# Patient Record
Sex: FEMALE | Race: WHITE | NOT HISPANIC OR LATINO | Employment: OTHER | URBAN - METROPOLITAN AREA
[De-identification: names, ages, dates, MRNs, and addresses within clinical notes are randomized per-mention and may not be internally consistent; named-entity substitution may affect disease eponyms.]

---

## 2017-06-21 ENCOUNTER — OFFICE VISIT (OUTPATIENT)
Dept: INTERNAL MEDICINE | Facility: CLINIC | Age: 57
End: 2017-06-21
Attending: INTERNAL MEDICINE
Payer: COMMERCIAL

## 2017-06-21 VITALS
HEART RATE: 101 BPM | BODY MASS INDEX: 20.58 KG/M2 | WEIGHT: 128.06 LBS | SYSTOLIC BLOOD PRESSURE: 145 MMHG | OXYGEN SATURATION: 96 % | DIASTOLIC BLOOD PRESSURE: 83 MMHG | HEIGHT: 66 IN

## 2017-06-21 DIAGNOSIS — M79.7 FIBROMYALGIA: ICD-10-CM

## 2017-06-21 DIAGNOSIS — D86.0 SARCOIDOSIS OF LUNG: ICD-10-CM

## 2017-06-21 DIAGNOSIS — K70.0 ALCOHOLIC FATTY LIVER: ICD-10-CM

## 2017-06-21 DIAGNOSIS — Z72.0 TOBACCO ABUSE: ICD-10-CM

## 2017-06-21 DIAGNOSIS — E78.5 HYPERLIPIDEMIA, UNSPECIFIED HYPERLIPIDEMIA TYPE: ICD-10-CM

## 2017-06-21 DIAGNOSIS — R11.0 NAUSEA: ICD-10-CM

## 2017-06-21 DIAGNOSIS — F10.10 ETOH ABUSE: Primary | ICD-10-CM

## 2017-06-21 DIAGNOSIS — T14.8XXA BRUISING: ICD-10-CM

## 2017-06-21 DIAGNOSIS — Z12.39 SCREENING FOR BREAST CANCER: ICD-10-CM

## 2017-06-21 DIAGNOSIS — F33.1 MODERATE EPISODE OF RECURRENT MAJOR DEPRESSIVE DISORDER: ICD-10-CM

## 2017-06-21 DIAGNOSIS — I10 ESSENTIAL HYPERTENSION: ICD-10-CM

## 2017-06-21 PROCEDURE — 99999 PR PBB SHADOW E&M-EST. PATIENT-LVL IV: CPT | Mod: PBBFAC,,, | Performed by: INTERNAL MEDICINE

## 2017-06-21 PROCEDURE — 99214 OFFICE O/P EST MOD 30 MIN: CPT | Mod: S$GLB,,, | Performed by: INTERNAL MEDICINE

## 2017-06-21 RX ORDER — ONDANSETRON 4 MG/1
4 TABLET, ORALLY DISINTEGRATING ORAL EVERY 8 HOURS PRN
Qty: 12 TABLET | Refills: 0 | Status: SHIPPED | OUTPATIENT
Start: 2017-06-21 | End: 2017-10-03 | Stop reason: SDUPTHER

## 2017-06-21 NOTE — PROGRESS NOTES
"Subjective:       Patient ID: Earl Abdul is a 57 y.o. female.    Chief Complaint: Fatigue; Dizziness; and Nausea    HPI   Here to est care.     Hx of depression on multiple meds pristiq, buspirone, trazodone - does not have psychiatrist. Reports has been on these meds for many years. Reports depression poorly controlled. No si/hi/rawls.   Stopped seeing counselor 1 month ago. Did not feel like she was getting much feedback.   Has long standing hx of etoh and tobacco abuse. Started drinking again 6 weeks ago as son going through trial and was recently incarcerated x 5 years for DWI  She reports drinking 4-5 drinks per day - mostly gin and wine. Awakes in am jittery. Drinks throughout the day.   Has hx of ETOH fatty liver F/u with Dr. Eldon Siddiqui - had liver bx in past - Has appt 7/11/17  Hx of cdiff as well that was treated in 2016 per her.     F/u with pain mgmt [facility] for morphine 10mg for djd of back. She takes baclofen prn for mm spasms - this was ordered by pcp     She reports would like to consider inpatient detox to "get off alcohol and morphine".     Reports due to son's recent trial and sentencing and worsening of depression she has not been eating and drinking other fluids outside of ETOH over past few weeks. Over past few days has noticed drinking less fluids other than etoh and will get lightheaded if stands to quickly. This quickly subsides after a few seconds. Does not occur at other times. No palpitations. No numbness or vision changes.   No si/hi/rawls at this time.   She is taking Bentyl prn and also takes pantoprazole daily. Has long standing hx of nausea - most prominent in mornings after waking over past couple of years.    Taking vit d and MVI  Drinking ensure which she has noticed causes loose stools at times. Reports has hx of constipation and episodic loose stools in bt. No abd pain. No fevers or chills. No AMS. No jaundice. No inc in abd girth.   Reports has noticed bruising on arms. " Not legs - may be due to running into objects. No abuse at home. No gum bleeding, no melena or brbpr. No vag bleeding.    No urinary frequency, urgency, burning with urination, hematuria, foul smelling urine, cloudy urine, suprapubic pain or pressure.   No cp or sob.       Has hx of htn but med was stopped after pt was hypotensive at last admission in 2016. She reports bp is usually less than 140/90.   Review of Systems    Objective:      Physical Exam   Constitutional: She is oriented to person, place, and time. She appears well-developed and well-nourished.   HENT:   Head: Normocephalic and atraumatic.   Right Ear: External ear normal.   Left Ear: External ear normal.   Nose: Nose normal.   Mouth/Throat: Oropharynx is clear and moist. No oropharyngeal exudate.   No carotid bruits   Eyes: Conjunctivae and EOM are normal.   Neck: Neck supple. No thyromegaly present.   Cardiovascular: Normal rate, regular rhythm, normal heart sounds and intact distal pulses.    Pulmonary/Chest: Effort normal and breath sounds normal.   Abdominal: Soft. Bowel sounds are normal. She exhibits no distension and no mass. There is no tenderness. There is no rebound and no guarding. No hernia.   No cva or flank ttp   Musculoskeletal: She exhibits no edema or tenderness.   Lymphadenopathy:     She has no cervical adenopathy.   Neurological: She is alert and oriented to person, place, and time. Coordination normal.   Skin: Skin is warm and dry. Capillary refill takes less than 2 seconds.   Ecchymosis to left deltoid 1 cm round   Psychiatric: She has a normal mood and affect. Her behavior is normal. Judgment and thought content normal.       Assessment:       Earl was seen today for fatigue, dizziness and nausea.    Diagnoses and all orders for this visit:    ETOH abuse: refer to psych, CM for inpt detox list.   -     Ambulatory Referral to Addiction Specialist  -     Ambulatory referral to Outpatient Case Management  -     Ammonia;  Future    Nausea: chronic - suspect due to etoh and possibly gastritis diff dx long - check labs - inpt detox.  -     TSH; Future  -     CBC auto differential; Future  -     Comprehensive metabolic panel; Future  -     Hemoglobin A1c; Future    Essential hypertension: no longer on meds for this- hold off on resuming med until labs return. She has intermittent orthostatic hypotension from poor po intake and etoh - rec she monitor this at home and on rtc. If persistently > 140/90 then will resume med    Fibromyalgia:     Sarcoidosis of lung: per pt stable - does not f/u with pulm - no sob, wheezing    Tobacco abuse: counseled to quit    Moderate episode of recurrent major depressive disorder: needs to est care with psych for med mgmt - not well controlled. No si/hi/rawls  -     Ambulatory Referral to Psychology    Hyperlipidemia, unspecified hyperlipidemia type  -     Lipid panel; Future    Screening for breast cancer  -     Mammo Digital Screening Bilat with CAD; Future    Alcoholic fatty liver:keep appt with GI, agree with inpt detox program to help wean off of etoh - check labs    Bruising:   -     Protime-INR; Future  -     APTT; Future    Other orders  -     ondansetron (ZOFRAN ODT) 4 MG TbDL; Take 1 tablet (4 mg total) by mouth every 8 (eight) hours as needed (nausea).    orthostatic hypotension: rec cont to inc po intake -     ER and RTC prompts given

## 2017-06-22 ENCOUNTER — LAB VISIT (OUTPATIENT)
Dept: LAB | Facility: OTHER | Age: 57
End: 2017-06-22
Attending: INTERNAL MEDICINE
Payer: COMMERCIAL

## 2017-06-22 DIAGNOSIS — T14.8XXA BRUISING: ICD-10-CM

## 2017-06-22 DIAGNOSIS — R11.0 NAUSEA: ICD-10-CM

## 2017-06-22 DIAGNOSIS — F10.10 ETOH ABUSE: ICD-10-CM

## 2017-06-22 LAB
ALBUMIN SERPL BCP-MCNC: 4.2 G/DL
ALP SERPL-CCNC: 147 U/L
ALT SERPL W/O P-5'-P-CCNC: 52 U/L
AMMONIA PLAS-SCNC: 59 UMOL/L
ANION GAP SERPL CALC-SCNC: 14 MMOL/L
ANISOCYTOSIS BLD QL SMEAR: SLIGHT
APTT BLDCRRT: 28.2 SEC
AST SERPL-CCNC: 199 U/L
BASOPHILS # BLD AUTO: 0.01 K/UL
BASOPHILS NFR BLD: 0.2 %
BILIRUB SERPL-MCNC: 1.3 MG/DL
BUN SERPL-MCNC: 6 MG/DL
CALCIUM SERPL-MCNC: 9.3 MG/DL
CHLORIDE SERPL-SCNC: 100 MMOL/L
CO2 SERPL-SCNC: 31 MMOL/L
CREAT SERPL-MCNC: 0.8 MG/DL
DIFFERENTIAL METHOD: ABNORMAL
EOSINOPHIL # BLD AUTO: 0.1 K/UL
EOSINOPHIL NFR BLD: 1.7 %
ERYTHROCYTE [DISTWIDTH] IN BLOOD BY AUTOMATED COUNT: 14.3 %
EST. GFR  (AFRICAN AMERICAN): >60 ML/MIN/1.73 M^2
EST. GFR  (NON AFRICAN AMERICAN): >60 ML/MIN/1.73 M^2
GLUCOSE SERPL-MCNC: 87 MG/DL
HCT VFR BLD AUTO: 42.8 %
HGB BLD-MCNC: 14.2 G/DL
INR PPP: 1
LYMPHOCYTES # BLD AUTO: 2 K/UL
LYMPHOCYTES NFR BLD: 46.9 %
MCH RBC QN AUTO: 33.3 PG
MCHC RBC AUTO-ENTMCNC: 33.2 %
MCV RBC AUTO: 100 FL
MONOCYTES # BLD AUTO: 0.4 K/UL
MONOCYTES NFR BLD: 10.5 %
NEUTROPHILS # BLD AUTO: 1.7 K/UL
NEUTROPHILS NFR BLD: 40.5 %
PLATELET # BLD AUTO: 57 K/UL
PLATELET BLD QL SMEAR: ABNORMAL
PMV BLD AUTO: 10.3 FL
POTASSIUM SERPL-SCNC: 4.4 MMOL/L
PROT SERPL-MCNC: 7.8 G/DL
PROTHROMBIN TIME: 11.3 SEC
RBC # BLD AUTO: 4.27 M/UL
SODIUM SERPL-SCNC: 145 MMOL/L
TSH SERPL DL<=0.005 MIU/L-ACNC: 2.35 UIU/ML
WBC # BLD AUTO: 4.2 K/UL

## 2017-06-22 PROCEDURE — 85730 THROMBOPLASTIN TIME PARTIAL: CPT

## 2017-06-22 PROCEDURE — 36415 COLL VENOUS BLD VENIPUNCTURE: CPT

## 2017-06-22 PROCEDURE — 80053 COMPREHEN METABOLIC PANEL: CPT

## 2017-06-22 PROCEDURE — 85025 COMPLETE CBC W/AUTO DIFF WBC: CPT

## 2017-06-22 PROCEDURE — 83036 HEMOGLOBIN GLYCOSYLATED A1C: CPT

## 2017-06-22 PROCEDURE — 85610 PROTHROMBIN TIME: CPT

## 2017-06-22 PROCEDURE — 82140 ASSAY OF AMMONIA: CPT

## 2017-06-22 PROCEDURE — 84443 ASSAY THYROID STIM HORMONE: CPT

## 2017-06-23 ENCOUNTER — TELEPHONE (OUTPATIENT)
Dept: INTERNAL MEDICINE | Facility: CLINIC | Age: 57
End: 2017-06-23

## 2017-06-23 ENCOUNTER — OUTPATIENT CASE MANAGEMENT (OUTPATIENT)
Dept: ADMINISTRATIVE | Facility: OTHER | Age: 57
End: 2017-06-23

## 2017-06-23 DIAGNOSIS — F10.29 ALCOHOL DEPENDENCE WITH UNSPECIFIED ALCOHOL-INDUCED DISORDER: ICD-10-CM

## 2017-06-23 DIAGNOSIS — D75.89 MACROCYTOSIS: ICD-10-CM

## 2017-06-23 DIAGNOSIS — D69.1 THROMBOCYTOPATHIA: Primary | ICD-10-CM

## 2017-06-23 DIAGNOSIS — R79.89 ELEVATED LFTS: ICD-10-CM

## 2017-06-23 DIAGNOSIS — D69.6 THROMBOCYTOPENIA: ICD-10-CM

## 2017-06-23 LAB
ESTIMATED AVG GLUCOSE: 80 MG/DL
HBA1C MFR BLD HPLC: 4.4 %

## 2017-06-23 NOTE — TELEPHONE ENCOUNTER
Inform pt that I am covering for Dr. Galvin and that labs show platelets to be low which is why she may be bruising. I'd like her to repeat this test today if she can or soon as she can--please schedule lab. Also, her liver tests were elevated which may be due to alcohol. I'd like her to get an ultrasound--please arrange. It is important she reduce and eliminate alcohol intake. I will forward results to Dr. Galvin to review next week. If she has any uncontrollable bleeding or new concerning symptoms that arise, she needs to go to emergency room.

## 2017-06-23 NOTE — PROGRESS NOTES
Thank you for the referral.  Patient has been assigned to RAMY Cristina for low risk screening for Outpatient Case Management.     Reason for referral:   ETOH abuse    Thank you,  Angelina Mccarthy

## 2017-06-26 ENCOUNTER — TELEPHONE (OUTPATIENT)
Dept: INTERNAL MEDICINE | Facility: CLINIC | Age: 57
End: 2017-06-26

## 2017-06-26 NOTE — TELEPHONE ENCOUNTER
----- Message from Jose Lucas sent at 6/26/2017  9:40 AM CDT -----  Contact: pt  x1st Request   _ 2nd Request   _ 3rd Request     Who: MARTY SALDAÑA [[id number]]    Why: Pt missed your call is requesting a call back.  Pt also wants to reschedule her U/S.    What Number to Call Back: [phone]    When to Expect a call back: (Before the end of the day)   -- if call after 3:00 call back will be tomorrow.

## 2017-06-26 NOTE — TELEPHONE ENCOUNTER
----- Message from Oralia Blackman sent at 6/23/2017  3:54 PM CDT -----  Contact: pt  _  1st Request  _  2nd Request  _  3rd Request        Who: pt    Why: pt returned the nurse's phone call. Please return call before the day is over with so she won't worry regarding her labs. Please call the pt     What Number to Call Back:[phone]    When to Expect a call back: (Before the end of the day)   -- if the call is after 12:00, the call back will be tomorrow.

## 2017-06-26 NOTE — TELEPHONE ENCOUNTER
Pt states that she will be able to make it to her upcoming US appointment. Pt states that she was unaware that she had an appointment scheduled for Today. Pt has been informed that the office attempted to contact her several times on 6/23/2017 upon review of her chart. Pt has no further questions at this time.

## 2017-06-28 ENCOUNTER — OUTPATIENT CASE MANAGEMENT (OUTPATIENT)
Dept: ADMINISTRATIVE | Facility: OTHER | Age: 57
End: 2017-06-28

## 2017-06-28 ENCOUNTER — TELEPHONE (OUTPATIENT)
Dept: INTERNAL MEDICINE | Facility: CLINIC | Age: 57
End: 2017-06-28

## 2017-06-28 NOTE — TELEPHONE ENCOUNTER
Pt states that she has upcoming labs scheduled and will be in a detox facility when her lab results come in. Pt is requesting that pcp contact her  if her lab results return and are abnormal or emergent.Pt has been informed that the pcp is currently out of the office and will not return until July 5; this message will be routed to her box.

## 2017-06-28 NOTE — PROGRESS NOTES
Attempt #:  1  This CSW attempted to reach patient/caregiver to provide resource and left a message requesting a return call.      CSW returned patient call from recorder. No answer CSW left a message for a return call.

## 2017-06-28 NOTE — TELEPHONE ENCOUNTER
----- Message from Eun Do sent at 6/28/2017  9:45 AM CDT -----  Contact: MARTY SALDAÑA [[id number]]  _x  1st Request  _  2nd Request  _  3rd Request        Who: MARTY SALDAÑA [[id number]]    Why: pt would like a call back states its very important she talk to the doctor . Please call, Thanks!    What Number to Call Back: [phone]    When to Expect a call back: (Before the end of the day)   -- if the call is after 12:00, the call back will be tomorrow.

## 2017-06-29 ENCOUNTER — OUTPATIENT CASE MANAGEMENT (OUTPATIENT)
Dept: ADMINISTRATIVE | Facility: OTHER | Age: 57
End: 2017-06-29

## 2017-06-29 ENCOUNTER — TELEPHONE (OUTPATIENT)
Dept: INTERNAL MEDICINE | Facility: CLINIC | Age: 57
End: 2017-06-29

## 2017-06-29 NOTE — TELEPHONE ENCOUNTER
----- Message from RAMY Childress sent at 6/29/2017  2:31 PM CDT -----  This CSW contacted patient regarding referral. Patient advised CSW she will start a detox program on Sunday. Patient also advised CSW she has a Counselor/Therapist.    Patient stated there were no needs at this time.      Thank you for the referral,    RAMY Cristina

## 2017-06-29 NOTE — PROGRESS NOTES
This CSW contacted patient regarding referral.  Patient/Caregiver stated there were no needs at this time.Patient advised CSW she will start a detox program on Sunday. Patient also advised CSW she has a Counselor.

## 2017-07-14 ENCOUNTER — HOSPITAL ENCOUNTER (OUTPATIENT)
Dept: RADIOLOGY | Facility: OTHER | Age: 57
Discharge: HOME | End: 2017-07-14
Attending: INTERNAL MEDICINE
Payer: COMMERCIAL

## 2017-07-14 VITALS — HEIGHT: 66 IN | WEIGHT: 128 LBS | BODY MASS INDEX: 20.57 KG/M2

## 2017-07-14 DIAGNOSIS — D69.6 THROMBOCYTOPENIA: ICD-10-CM

## 2017-07-14 DIAGNOSIS — F10.29 ALCOHOL DEPENDENCE WITH UNSPECIFIED ALCOHOL-INDUCED DISORDER: ICD-10-CM

## 2017-07-14 DIAGNOSIS — R79.89 ELEVATED LFTS: ICD-10-CM

## 2017-07-14 DIAGNOSIS — Z12.39 SCREENING FOR BREAST CANCER: ICD-10-CM

## 2017-07-14 PROCEDURE — 77067 SCR MAMMO BI INCL CAD: CPT | Mod: 26,,, | Performed by: RADIOLOGY

## 2017-07-14 PROCEDURE — 77067 SCR MAMMO BI INCL CAD: CPT | Mod: TC

## 2017-07-14 PROCEDURE — 76700 US EXAM ABDOM COMPLETE: CPT | Mod: 26,,, | Performed by: RADIOLOGY

## 2017-07-14 PROCEDURE — 76700 US EXAM ABDOM COMPLETE: CPT | Mod: TC

## 2017-07-15 ENCOUNTER — TELEPHONE (OUTPATIENT)
Dept: INTERNAL MEDICINE | Facility: CLINIC | Age: 57
End: 2017-07-15

## 2017-07-15 NOTE — TELEPHONE ENCOUNTER
Please call pt and schedule labs CBC, folate, B1, B12 ASAP.   Message sent to pt via my chart with lab results and updates to plan.   abd US is stable and there are no findings to explain the low platelet count on prior labs - rec repeat labs asap

## 2017-07-17 NOTE — TELEPHONE ENCOUNTER
Called pt and left a vm stating that  rec pt complete labs asap, also stated that I went ahead and scheduled lab appt for 7/19/2017 at 10:15.  Left office number for pt to call and reschedule if this time does not work for her schedule .

## 2017-07-18 ENCOUNTER — HOSPITAL ENCOUNTER (EMERGENCY)
Facility: HOSPITAL | Age: 57
Discharge: HOME | End: 2017-07-18
Attending: EMERGENCY MEDICINE
Payer: COMMERCIAL

## 2017-07-18 VITALS
BODY MASS INDEX: 19.29 KG/M2 | DIASTOLIC BLOOD PRESSURE: 64 MMHG | WEIGHT: 120 LBS | HEART RATE: 66 BPM | RESPIRATION RATE: 18 BRPM | SYSTOLIC BLOOD PRESSURE: 117 MMHG | TEMPERATURE: 100 F | HEIGHT: 66 IN | OXYGEN SATURATION: 95 %

## 2017-07-18 DIAGNOSIS — E87.6 HYPOKALEMIA: ICD-10-CM

## 2017-07-18 DIAGNOSIS — K52.9 ENTERITIS: Primary | ICD-10-CM

## 2017-07-18 LAB
ALBUMIN SERPL BCP-MCNC: 3.9 G/DL
ALP SERPL-CCNC: 89 U/L
ALT SERPL W/O P-5'-P-CCNC: 69 U/L
ANION GAP SERPL CALC-SCNC: 14 MMOL/L
AST SERPL-CCNC: 55 U/L
BASOPHILS # BLD AUTO: 0.01 K/UL
BASOPHILS NFR BLD: 0.1 %
BILIRUB SERPL-MCNC: 0.9 MG/DL
BILIRUB UR QL STRIP: NEGATIVE
BUN SERPL-MCNC: 3 MG/DL
CALCIUM SERPL-MCNC: 9.2 MG/DL
CHLORIDE SERPL-SCNC: 99 MMOL/L
CLARITY UR: CLEAR
CO2 SERPL-SCNC: 26 MMOL/L
COLOR UR: YELLOW
CREAT SERPL-MCNC: 0.7 MG/DL
DIFFERENTIAL METHOD: ABNORMAL
EOSINOPHIL # BLD AUTO: 0 K/UL
EOSINOPHIL NFR BLD: 0 %
ERYTHROCYTE [DISTWIDTH] IN BLOOD BY AUTOMATED COUNT: 13.9 %
EST. GFR  (AFRICAN AMERICAN): >60 ML/MIN/1.73 M^2
EST. GFR  (NON AFRICAN AMERICAN): >60 ML/MIN/1.73 M^2
GLUCOSE SERPL-MCNC: 85 MG/DL
GLUCOSE UR QL STRIP: NEGATIVE
HCT VFR BLD AUTO: 39.2 %
HGB BLD-MCNC: 13.3 G/DL
HGB UR QL STRIP: NEGATIVE
KETONES UR QL STRIP: NEGATIVE
LEUKOCYTE ESTERASE UR QL STRIP: NEGATIVE
LIPASE SERPL-CCNC: 9 U/L
LYMPHOCYTES # BLD AUTO: 2.3 K/UL
LYMPHOCYTES NFR BLD: 23.5 %
MCH RBC QN AUTO: 32.6 PG
MCHC RBC AUTO-ENTMCNC: 33.9 %
MCV RBC AUTO: 96 FL
MONOCYTES # BLD AUTO: 0.4 K/UL
MONOCYTES NFR BLD: 3.9 %
NEUTROPHILS # BLD AUTO: 7 K/UL
NEUTROPHILS NFR BLD: 72.3 %
NITRITE UR QL STRIP: NEGATIVE
PH UR STRIP: 6 [PH] (ref 5–8)
PLATELET # BLD AUTO: 208 K/UL
PMV BLD AUTO: 10.8 FL
POTASSIUM SERPL-SCNC: 3.1 MMOL/L
PROT SERPL-MCNC: 7.3 G/DL
PROT UR QL STRIP: NEGATIVE
RBC # BLD AUTO: 4.08 M/UL
SODIUM SERPL-SCNC: 139 MMOL/L
SP GR UR STRIP: <=1.005 (ref 1–1.03)
URN SPEC COLLECT METH UR: ABNORMAL
UROBILINOGEN UR STRIP-ACNC: NEGATIVE EU/DL
WBC # BLD AUTO: 9.67 K/UL

## 2017-07-18 PROCEDURE — 63600175 PHARM REV CODE 636 W HCPCS: Performed by: EMERGENCY MEDICINE

## 2017-07-18 PROCEDURE — 99285 EMERGENCY DEPT VISIT HI MDM: CPT | Mod: 25

## 2017-07-18 PROCEDURE — 83690 ASSAY OF LIPASE: CPT

## 2017-07-18 PROCEDURE — 25000003 PHARM REV CODE 250: Performed by: EMERGENCY MEDICINE

## 2017-07-18 PROCEDURE — 96361 HYDRATE IV INFUSION ADD-ON: CPT

## 2017-07-18 PROCEDURE — 93005 ELECTROCARDIOGRAM TRACING: CPT

## 2017-07-18 PROCEDURE — 81003 URINALYSIS AUTO W/O SCOPE: CPT

## 2017-07-18 PROCEDURE — 80053 COMPREHEN METABOLIC PANEL: CPT

## 2017-07-18 PROCEDURE — 96376 TX/PRO/DX INJ SAME DRUG ADON: CPT

## 2017-07-18 PROCEDURE — 96374 THER/PROPH/DIAG INJ IV PUSH: CPT

## 2017-07-18 PROCEDURE — 96375 TX/PRO/DX INJ NEW DRUG ADDON: CPT

## 2017-07-18 PROCEDURE — 85025 COMPLETE CBC W/AUTO DIFF WBC: CPT

## 2017-07-18 PROCEDURE — 25500020 PHARM REV CODE 255: Performed by: EMERGENCY MEDICINE

## 2017-07-18 RX ORDER — CIPROFLOXACIN 500 MG/1
500 TABLET ORAL
Status: COMPLETED | OUTPATIENT
Start: 2017-07-18 | End: 2017-07-18

## 2017-07-18 RX ORDER — DIPHENHYDRAMINE HYDROCHLORIDE 50 MG/ML
50 INJECTION INTRAMUSCULAR; INTRAVENOUS
Status: COMPLETED | OUTPATIENT
Start: 2017-07-18 | End: 2017-07-18

## 2017-07-18 RX ORDER — DICYCLOMINE HYDROCHLORIDE 20 MG/1
20 TABLET ORAL 2 TIMES DAILY
Qty: 20 TABLET | Refills: 0 | Status: SHIPPED | OUTPATIENT
Start: 2017-07-18 | End: 2017-08-17

## 2017-07-18 RX ORDER — METRONIDAZOLE 500 MG/1
500 TABLET ORAL
Status: COMPLETED | OUTPATIENT
Start: 2017-07-18 | End: 2017-07-18

## 2017-07-18 RX ORDER — HYDROMORPHONE HYDROCHLORIDE 1 MG/ML
1 INJECTION, SOLUTION INTRAMUSCULAR; INTRAVENOUS; SUBCUTANEOUS
Status: COMPLETED | OUTPATIENT
Start: 2017-07-18 | End: 2017-07-18

## 2017-07-18 RX ORDER — CIPROFLOXACIN 500 MG/1
500 TABLET ORAL 2 TIMES DAILY
Qty: 14 TABLET | Refills: 0 | Status: ON HOLD | OUTPATIENT
Start: 2017-07-18 | End: 2017-07-23 | Stop reason: HOSPADM

## 2017-07-18 RX ORDER — POTASSIUM CHLORIDE 20 MEQ/1
40 TABLET, EXTENDED RELEASE ORAL
Status: COMPLETED | OUTPATIENT
Start: 2017-07-18 | End: 2017-07-18

## 2017-07-18 RX ORDER — ONDANSETRON 2 MG/ML
4 INJECTION INTRAMUSCULAR; INTRAVENOUS
Status: COMPLETED | OUTPATIENT
Start: 2017-07-18 | End: 2017-07-18

## 2017-07-18 RX ORDER — METRONIDAZOLE 500 MG/1
500 TABLET ORAL 3 TIMES DAILY
Qty: 14 TABLET | Refills: 0 | Status: ON HOLD | OUTPATIENT
Start: 2017-07-18 | End: 2017-07-23 | Stop reason: HOSPADM

## 2017-07-18 RX ADMIN — CIPROFLOXACIN 500 MG: 500 TABLET, FILM COATED ORAL at 10:07

## 2017-07-18 RX ADMIN — DIPHENHYDRAMINE HYDROCHLORIDE 50 MG: 50 INJECTION, SOLUTION INTRAMUSCULAR; INTRAVENOUS at 09:07

## 2017-07-18 RX ADMIN — SODIUM CHLORIDE 1000 ML: 0.9 INJECTION, SOLUTION INTRAVENOUS at 08:07

## 2017-07-18 RX ADMIN — HYDROMORPHONE HYDROCHLORIDE 1 MG: 1 INJECTION, SOLUTION INTRAMUSCULAR; INTRAVENOUS; SUBCUTANEOUS at 08:07

## 2017-07-18 RX ADMIN — POTASSIUM CHLORIDE 40 MEQ: 20 TABLET, EXTENDED RELEASE ORAL at 09:07

## 2017-07-18 RX ADMIN — IOHEXOL 75 ML: 350 INJECTION, SOLUTION INTRAVENOUS at 08:07

## 2017-07-18 RX ADMIN — ONDANSETRON 4 MG: 2 INJECTION INTRAMUSCULAR; INTRAVENOUS at 08:07

## 2017-07-18 RX ADMIN — METRONIDAZOLE 500 MG: 500 TABLET ORAL at 10:07

## 2017-07-19 ENCOUNTER — LAB VISIT (OUTPATIENT)
Dept: LAB | Facility: OTHER | Age: 57
End: 2017-07-19
Attending: INTERNAL MEDICINE
Payer: COMMERCIAL

## 2017-07-19 DIAGNOSIS — F10.29 ALCOHOL DEPENDENCE WITH UNSPECIFIED ALCOHOL-INDUCED DISORDER: ICD-10-CM

## 2017-07-19 DIAGNOSIS — D69.6 THROMBOCYTOPENIA: ICD-10-CM

## 2017-07-19 DIAGNOSIS — D75.89 MACROCYTOSIS: ICD-10-CM

## 2017-07-19 DIAGNOSIS — D69.1 THROMBOCYTOPATHIA: ICD-10-CM

## 2017-07-19 DIAGNOSIS — R10.9 ABDOMINAL PAIN: Primary | ICD-10-CM

## 2017-07-19 DIAGNOSIS — E78.5 HYPERLIPIDEMIA, UNSPECIFIED HYPERLIPIDEMIA TYPE: ICD-10-CM

## 2017-07-19 DIAGNOSIS — R79.89 ELEVATED LFTS: ICD-10-CM

## 2017-07-19 LAB
BASOPHILS # BLD AUTO: 0.02 K/UL
BASOPHILS NFR BLD: 0.3 %
CHOLEST/HDLC SERPL: 6.3 {RATIO}
DIFFERENTIAL METHOD: ABNORMAL
EOSINOPHIL # BLD AUTO: 0.1 K/UL
EOSINOPHIL NFR BLD: 0.8 %
ERYTHROCYTE [DISTWIDTH] IN BLOOD BY AUTOMATED COUNT: 14.3 %
FOLATE SERPL-MCNC: 15.1 NG/ML
HCT VFR BLD AUTO: 38 %
HDL/CHOLESTEROL RATIO: 16 %
HDLC SERPL-MCNC: 194 MG/DL
HDLC SERPL-MCNC: 31 MG/DL
HGB BLD-MCNC: 12.4 G/DL
LDLC SERPL CALC-MCNC: 131 MG/DL
LYMPHOCYTES # BLD AUTO: 2 K/UL
LYMPHOCYTES NFR BLD: 31.5 %
MCH RBC QN AUTO: 32.6 PG
MCHC RBC AUTO-ENTMCNC: 32.6 %
MCV RBC AUTO: 100 FL
MONOCYTES # BLD AUTO: 0.5 K/UL
MONOCYTES NFR BLD: 8.3 %
NEUTROPHILS # BLD AUTO: 3.8 K/UL
NEUTROPHILS NFR BLD: 58.9 %
NONHDLC SERPL-MCNC: 163 MG/DL
PLATELET # BLD AUTO: 191 K/UL
PMV BLD AUTO: 10.8 FL
RBC # BLD AUTO: 3.8 M/UL
TRIGL SERPL-MCNC: 160 MG/DL
VIT B12 SERPL-MCNC: 496 PG/ML
WBC # BLD AUTO: 6.38 K/UL

## 2017-07-19 PROCEDURE — 82746 ASSAY OF FOLIC ACID SERUM: CPT

## 2017-07-19 PROCEDURE — 80061 LIPID PANEL: CPT

## 2017-07-19 PROCEDURE — 85025 COMPLETE CBC W/AUTO DIFF WBC: CPT

## 2017-07-19 PROCEDURE — 82607 VITAMIN B-12: CPT

## 2017-07-19 PROCEDURE — 84425 ASSAY OF VITAMIN B-1: CPT

## 2017-07-19 PROCEDURE — 36415 COLL VENOUS BLD VENIPUNCTURE: CPT

## 2017-07-19 NOTE — DISCHARGE INSTRUCTIONS
1) PLEASE FOLLOW UP WITH YOUR PRIMARY CARE PROVIDER IN 3 DAYS IF YOU ARE NOT SIGNIFICANTLY IMPROVED  2) PLEASE TAKE YOUR MEDICATIONS AS PRESCRIBED- BENTYL AND ZOFRAN TO HELP WITH SYMPTOMS, ANTIBIOTICS TO HELP WITH YOUR COLITIS. PLEASE STAY WELL HYDRATED WITH GATORADE OR ELECTROLYTE RICH FLUIDS, AND EAT A BANANA FOR THE NEXT FEW DAYS  3) RETURN TO THE ED FOR WORSENING SYMPTOMS

## 2017-07-19 NOTE — ED PROVIDER NOTES
Encounter Date: 7/18/2017       History     Chief Complaint   Patient presents with    Abdominal Pain     has been present over the past couple of weeks. worse this morning, reports nause, vomiting, and diarrhea. stats has been unable to hold anything down today     57-year-old female presents emergency Department with complaints of abdominal pain.  Pain started this morning around 1 AM, and is progressively gotten worse.  She has had accompanying diarrhea, vomiting and abdominal cramping. No fevers. No CP/SOB or back pain. No recent travel, antibiotics or sick contacts.       The history is provided by the patient.     Review of patient's allergies indicates:   Allergen Reactions    Fentanyl Other (See Comments)     Other reaction(s): Itching    Lortab  [hydrocodone-acetaminophen] Other (See Comments)    Phenothiazines     Promethazine Other (See Comments)     Other reaction(s): Itching    Promethazine hcl Hives     Past Medical History:   Diagnosis Date    Anemia     Anemia     Depression     Fatty liver     Hyperlipidemia     Hypertension     Polysubstance abuse     Sarcoidosis of lung     Suicide attempt     Suicide ideation      Past Surgical History:   Procedure Laterality Date    APPENDECTOMY      ESOPHAGOGASTRODUODENOSCOPY  10/7/2016, 11/6/2014    2016 - gastritis, duodenitis, 2014 erosive gastritis    FLEXIBLE SIGMOIDOSCOPY  11/06/2014    colitis    HYSTERECTOMY      mediastenoscopy      TONSILLECTOMY N/A 1970    TUBAL LIGATION       Family History   Problem Relation Age of Onset    Breast cancer Maternal Aunt     Colon cancer Maternal Uncle     Breast cancer Daughter     Ovarian cancer Neg Hx      Social History   Substance Use Topics    Smoking status: Current Every Day Smoker     Packs/day: 1.00     Years: 30.00     Types: Cigarettes    Smokeless tobacco: Never Used    Alcohol use No     Review of Systems   Eyes: Negative.    Endocrine: Negative.    Allergic/Immunologic:  Negative.    Neurological: Negative.    All other systems reviewed and are negative.      Physical Exam     Initial Vitals [07/18/17 1923]   BP Pulse Resp Temp SpO2   (!) 189/86 83 15 99.5 °F (37.5 °C) 95 %      MAP       120.33         Physical Exam    Nursing note and vitals reviewed.  Constitutional: She appears well-developed and well-nourished. She appears distressed.   HENT:   Head: Normocephalic and atraumatic.   Eyes: EOM are normal. Pupils are equal, round, and reactive to light.   Neck: Normal range of motion. Neck supple.   Cardiovascular: Normal rate.   Pulmonary/Chest: No respiratory distress.   Abdominal:   She has abdominal tenderness, diffusely, mostly centered around her periumbilical region.   Musculoskeletal: Normal range of motion.   Neurological: She is alert and oriented to person, place, and time. She has normal strength. No cranial nerve deficit.   Skin: Skin is warm and dry.   No leg swelling or edema.   Psychiatric: She has a normal mood and affect. Thought content normal.         ED Course   Procedures  Labs Reviewed   CBC W/ AUTO DIFFERENTIAL - Abnormal; Notable for the following:        Result Value    MCH 32.6 (*)     Mono% 3.9 (*)     All other components within normal limits   COMPREHENSIVE METABOLIC PANEL - Abnormal; Notable for the following:     Potassium 3.1 (*)     BUN, Bld 3 (*)     AST 55 (*)     ALT 69 (*)     All other components within normal limits   URINALYSIS - Abnormal; Notable for the following:     Specific Gravity, UA <=1.005 (*)     All other components within normal limits   LIPASE             Medical Decision Making:   History:   Old Records Summarized: records from previous admission(s).       <> Summary of Records: Just recently had US with normal gallbladder and spleen after elevated bilirubin and thrombocytopenia.   Initial Assessment:   57-year-old female with long-standing history of abdominal cramping and pain, here with abdominal pain,nausea vomiting and  diarrhea.  Differential Diagnosis:   Gastroenteritis, hepatitis, pancreatitis, cholecystitis, GERD, nephrolithiasis, colitis, hypokalemia, renal failure  ED Management:  I reviewed patient's blood work and imaging.  Her potassium was repleted.  She was given several rounds of IV fluids antiemetics, and analgesics.    Her CT scan shows evidence of enteritis.  I placed patient on Cipro and Flagyl, as well as repleted her potassium and recommended that she eat bananas for the next few days.    Patient was counseled on reasons to return to the ED and expressed understanding, patient was discharged in stable condition with appropriate outpatient follow up plan.                      ED Course     Clinical Impression:   The primary encounter diagnosis was Enteritis. A diagnosis of Hypokalemia was also pertinent to this visit.                           Katina Harrison MD  07/25/17 [id number]

## 2017-07-19 NOTE — ED TRIAGE NOTES
"Patient presents to the ED with reports of having worsening abdominal pain with nausea, vomiting, diarrhea, weakness, chills, and dizziness that started last night. States having had abdominal pain that started "a couple weeks ago" but states pain is now worse. Denies any fever, dysuria, or discharge. No relief with Rx zofran at home.     Review of patient's allergies indicates:   Allergen Reactions    Fentanyl Other (See Comments)     Other reaction(s): Itching    Lortab  [hydrocodone-acetaminophen] Other (See Comments)    Phenothiazines     Promethazine Other (See Comments)     Other reaction(s): Itching    Promethazine hcl Hives        Patient has verified the spelling of their name and  on armband.   APPEARANCE: Patient is alert, oriented x 4, and appears distressed.  SKIN: Skin is normal for race, warm, and dry. Normal skin turgor and mucous membranes moist.  CARDIAC: Normal rate and no murmur heard.   RESPIRATORY:Normal rate and effort. Breath sounds clear bilaterally throughout chest. Respirations are equal and unlabored.    GASTRO: Bowel sounds normal, abdomen is soft, and no abdominal distention. Generalized abdominal pain with tenderness to epigastric area. +Vomiting. +Diarrhea.  MUSCLE: Full ROM. No bony tenderness or soft tissue tenderness. No obvious deformity.  PERIPHERAL VASCULAR: peripheral pulses present. Normal cap refill. No edema. Warm to touch.  NEURO: 5/5 strength major flexors/extensors bilaterally. Sensory intact to light touch bilaterally. GCS 15. +Dizziness. +Weakness.   MENTAL STATUS: awake, alert and aware of environment.  "

## 2017-07-20 ENCOUNTER — HOSPITAL ENCOUNTER (OUTPATIENT)
Facility: OTHER | Age: 57
LOS: 1 days | Discharge: HOME | End: 2017-07-23
Attending: EMERGENCY MEDICINE | Admitting: HOSPITALIST
Payer: COMMERCIAL

## 2017-07-20 DIAGNOSIS — R56.9 NEW ONSET SEIZURE: ICD-10-CM

## 2017-07-20 DIAGNOSIS — R11.2 INTRACTABLE NAUSEA AND VOMITING: ICD-10-CM

## 2017-07-20 DIAGNOSIS — R10.9 ABDOMINAL PAIN: ICD-10-CM

## 2017-07-20 DIAGNOSIS — R11.2 INTRACTABLE VOMITING WITH NAUSEA, UNSPECIFIED VOMITING TYPE: ICD-10-CM

## 2017-07-20 DIAGNOSIS — I67.83 PRES (POSTERIOR REVERSIBLE ENCEPHALOPATHY SYNDROME): Primary | ICD-10-CM

## 2017-07-20 DIAGNOSIS — E87.6 HYPOKALEMIA: ICD-10-CM

## 2017-07-20 DIAGNOSIS — R10.9 INTRACTABLE ABDOMINAL PAIN: ICD-10-CM

## 2017-07-20 LAB
ALBUMIN SERPL BCP-MCNC: 4 G/DL
ALP SERPL-CCNC: 90 U/L
ALT SERPL W/O P-5'-P-CCNC: 71 U/L
AMPHET+METHAMPHET UR QL: NEGATIVE
ANION GAP SERPL CALC-SCNC: 14 MMOL/L
ANION GAP SERPL CALC-SCNC: 19 MMOL/L
AST SERPL-CCNC: 59 U/L
BARBITURATES UR QL SCN>200 NG/ML: NEGATIVE
BASOPHILS # BLD AUTO: 0.02 K/UL
BASOPHILS NFR BLD: 0.2 %
BENZODIAZ UR QL SCN>200 NG/ML: NORMAL
BILIRUB SERPL-MCNC: 1.1 MG/DL
BILIRUB UR QL STRIP: NEGATIVE
BUN SERPL-MCNC: 4 MG/DL
BUN SERPL-MCNC: 5 MG/DL
BZE UR QL SCN: NEGATIVE
CALCIUM SERPL-MCNC: 9.1 MG/DL
CALCIUM SERPL-MCNC: 9.8 MG/DL
CANNABINOIDS UR QL SCN: NEGATIVE
CHLORIDE SERPL-SCNC: 104 MMOL/L
CHLORIDE SERPL-SCNC: 104 MMOL/L
CLARITY UR: CLEAR
CO2 SERPL-SCNC: 19 MMOL/L
CO2 SERPL-SCNC: 24 MMOL/L
COLOR UR: YELLOW
CREAT SERPL-MCNC: 0.8 MG/DL
CREAT SERPL-MCNC: 0.8 MG/DL
CREAT UR-MCNC: 58 MG/DL
DIFFERENTIAL METHOD: ABNORMAL
EOSINOPHIL # BLD AUTO: 0.1 K/UL
EOSINOPHIL NFR BLD: 0.7 %
ERYTHROCYTE [DISTWIDTH] IN BLOOD BY AUTOMATED COUNT: 14.1 %
EST. GFR  (AFRICAN AMERICAN): >60 ML/MIN/1.73 M^2
EST. GFR  (AFRICAN AMERICAN): >60 ML/MIN/1.73 M^2
EST. GFR  (NON AFRICAN AMERICAN): >60 ML/MIN/1.73 M^2
EST. GFR  (NON AFRICAN AMERICAN): >60 ML/MIN/1.73 M^2
ETHANOL SERPL-MCNC: <10 MG/DL
ETHANOL UR-MCNC: <10 MG/DL
GLUCOSE SERPL-MCNC: 106 MG/DL
GLUCOSE SERPL-MCNC: 91 MG/DL
GLUCOSE UR QL STRIP: NEGATIVE
HCT VFR BLD AUTO: 41.2 %
HGB BLD-MCNC: 14 G/DL
HGB UR QL STRIP: NEGATIVE
KETONES UR QL STRIP: NEGATIVE
LEUKOCYTE ESTERASE UR QL STRIP: NEGATIVE
LIPASE SERPL-CCNC: 12 U/L
LYMPHOCYTES # BLD AUTO: 2.5 K/UL
LYMPHOCYTES NFR BLD: 27.7 %
MAGNESIUM SERPL-MCNC: 1.6 MG/DL
MCH RBC QN AUTO: 33 PG
MCHC RBC AUTO-ENTMCNC: 34 G/DL
MCV RBC AUTO: 97 FL
METHADONE UR QL SCN>300 NG/ML: NEGATIVE
MONOCYTES # BLD AUTO: 0.9 K/UL
MONOCYTES NFR BLD: 9.9 %
NEUTROPHILS # BLD AUTO: 5.5 K/UL
NEUTROPHILS NFR BLD: 61.3 %
NITRITE UR QL STRIP: NEGATIVE
OPIATES UR QL SCN: NEGATIVE
PCP UR QL SCN>25 NG/ML: NEGATIVE
PH UR STRIP: 7 [PH] (ref 5–8)
PHOSPHATE SERPL-MCNC: 3.2 MG/DL
PLATELET # BLD AUTO: 254 K/UL
PMV BLD AUTO: 10.8 FL
POCT GLUCOSE: 91 MG/DL (ref 70–110)
POTASSIUM SERPL-SCNC: 3 MMOL/L
POTASSIUM SERPL-SCNC: 3.1 MMOL/L
PROT SERPL-MCNC: 7.7 G/DL
PROT UR QL STRIP: NEGATIVE
RBC # BLD AUTO: 4.24 M/UL
SODIUM SERPL-SCNC: 142 MMOL/L
SODIUM SERPL-SCNC: 142 MMOL/L
SP GR UR STRIP: 1.01 (ref 1–1.03)
TOXICOLOGY INFORMATION: NORMAL
URN SPEC COLLECT METH UR: NORMAL
UROBILINOGEN UR STRIP-ACNC: NEGATIVE EU/DL
WBC # BLD AUTO: 9.02 K/UL

## 2017-07-20 PROCEDURE — 96375 TX/PRO/DX INJ NEW DRUG ADDON: CPT

## 2017-07-20 PROCEDURE — 63600175 PHARM REV CODE 636 W HCPCS

## 2017-07-20 PROCEDURE — G0378 HOSPITAL OBSERVATION PER HR: HCPCS

## 2017-07-20 PROCEDURE — 87046 STOOL CULTR AEROBIC BACT EA: CPT

## 2017-07-20 PROCEDURE — 25000003 PHARM REV CODE 250: Performed by: PHYSICIAN ASSISTANT

## 2017-07-20 PROCEDURE — 25000003 PHARM REV CODE 250: Performed by: INTERNAL MEDICINE

## 2017-07-20 PROCEDURE — 81003 URINALYSIS AUTO W/O SCOPE: CPT

## 2017-07-20 PROCEDURE — 96376 TX/PRO/DX INJ SAME DRUG ADON: CPT

## 2017-07-20 PROCEDURE — 82962 GLUCOSE BLOOD TEST: CPT

## 2017-07-20 PROCEDURE — 99236 HOSP IP/OBS SAME DATE HI 85: CPT | Mod: ,,,

## 2017-07-20 PROCEDURE — 93005 ELECTROCARDIOGRAM TRACING: CPT

## 2017-07-20 PROCEDURE — 85025 COMPLETE CBC W/AUTO DIFF WBC: CPT

## 2017-07-20 PROCEDURE — 80320 DRUG SCREEN QUANTALCOHOLS: CPT

## 2017-07-20 PROCEDURE — 80053 COMPREHEN METABOLIC PANEL: CPT

## 2017-07-20 PROCEDURE — 25000003 PHARM REV CODE 250

## 2017-07-20 PROCEDURE — 96365 THER/PROPH/DIAG IV INF INIT: CPT

## 2017-07-20 PROCEDURE — 83690 ASSAY OF LIPASE: CPT

## 2017-07-20 PROCEDURE — 63600175 PHARM REV CODE 636 W HCPCS: Performed by: EMERGENCY MEDICINE

## 2017-07-20 PROCEDURE — 99285 EMERGENCY DEPT VISIT HI MDM: CPT | Mod: 25

## 2017-07-20 PROCEDURE — 63600175 PHARM REV CODE 636 W HCPCS: Performed by: PHYSICIAN ASSISTANT

## 2017-07-20 PROCEDURE — 87045 FECES CULTURE AEROBIC BACT: CPT

## 2017-07-20 PROCEDURE — 87427 SHIGA-LIKE TOXIN AG IA: CPT

## 2017-07-20 PROCEDURE — 80048 BASIC METABOLIC PNL TOTAL CA: CPT

## 2017-07-20 PROCEDURE — 93010 ELECTROCARDIOGRAM REPORT: CPT | Mod: 76,,, | Performed by: INTERNAL MEDICINE

## 2017-07-20 PROCEDURE — 89055 LEUKOCYTE ASSESSMENT FECAL: CPT

## 2017-07-20 PROCEDURE — 96361 HYDRATE IV INFUSION ADD-ON: CPT

## 2017-07-20 PROCEDURE — 80307 DRUG TEST PRSMV CHEM ANLYZR: CPT

## 2017-07-20 PROCEDURE — 84100 ASSAY OF PHOSPHORUS: CPT

## 2017-07-20 PROCEDURE — 83735 ASSAY OF MAGNESIUM: CPT

## 2017-07-20 PROCEDURE — 63600175 PHARM REV CODE 636 W HCPCS: Performed by: HOSPITALIST

## 2017-07-20 PROCEDURE — 93010 ELECTROCARDIOGRAM REPORT: CPT | Mod: ,,, | Performed by: INTERNAL MEDICINE

## 2017-07-20 RX ORDER — ONDANSETRON 2 MG/ML
8 INJECTION INTRAMUSCULAR; INTRAVENOUS
Status: COMPLETED | OUTPATIENT
Start: 2017-07-20 | End: 2017-07-20

## 2017-07-20 RX ORDER — LORAZEPAM 2 MG/ML
2 INJECTION INTRAMUSCULAR
Status: DISCONTINUED | OUTPATIENT
Start: 2017-07-20 | End: 2017-07-23 | Stop reason: HOSPADM

## 2017-07-20 RX ORDER — ONDANSETRON 2 MG/ML
4 INJECTION INTRAMUSCULAR; INTRAVENOUS EVERY 6 HOURS PRN
Status: DISCONTINUED | OUTPATIENT
Start: 2017-07-20 | End: 2017-07-23 | Stop reason: HOSPADM

## 2017-07-20 RX ORDER — SODIUM CHLORIDE 9 MG/ML
INJECTION, SOLUTION INTRAVENOUS CONTINUOUS
Status: DISCONTINUED | OUTPATIENT
Start: 2017-07-20 | End: 2017-07-21

## 2017-07-20 RX ORDER — ACETAMINOPHEN 500 MG
1000 TABLET ORAL
Status: COMPLETED | OUTPATIENT
Start: 2017-07-20 | End: 2017-07-20

## 2017-07-20 RX ORDER — ACETAMINOPHEN 325 MG/1
650 TABLET ORAL EVERY 8 HOURS PRN
Status: DISCONTINUED | OUTPATIENT
Start: 2017-07-20 | End: 2017-07-23 | Stop reason: HOSPADM

## 2017-07-20 RX ORDER — ONDANSETRON 2 MG/ML
4 INJECTION INTRAMUSCULAR; INTRAVENOUS
Status: COMPLETED | OUTPATIENT
Start: 2017-07-20 | End: 2017-07-20

## 2017-07-20 RX ORDER — ACETAMINOPHEN 10 MG/ML
1000 INJECTION, SOLUTION INTRAVENOUS EVERY 8 HOURS
Status: DISPENSED | OUTPATIENT
Start: 2017-07-20 | End: 2017-07-21

## 2017-07-20 RX ORDER — TRAZODONE HYDROCHLORIDE 50 MG/1
50 TABLET ORAL NIGHTLY
Status: DISCONTINUED | OUTPATIENT
Start: 2017-07-20 | End: 2017-07-23 | Stop reason: HOSPADM

## 2017-07-20 RX ORDER — ENOXAPARIN SODIUM 100 MG/ML
40 INJECTION SUBCUTANEOUS EVERY 24 HOURS
Status: DISCONTINUED | OUTPATIENT
Start: 2017-07-20 | End: 2017-07-23 | Stop reason: HOSPADM

## 2017-07-20 RX ORDER — LORAZEPAM 2 MG/ML
1 INJECTION INTRAMUSCULAR
Status: COMPLETED | OUTPATIENT
Start: 2017-07-20 | End: 2017-07-20

## 2017-07-20 RX ORDER — HALOPERIDOL 5 MG/ML
5 INJECTION INTRAMUSCULAR
Status: COMPLETED | OUTPATIENT
Start: 2017-07-20 | End: 2017-07-20

## 2017-07-20 RX ORDER — LEVETIRACETAM 5 MG/ML
500 INJECTION INTRAVASCULAR EVERY 12 HOURS
Status: DISCONTINUED | OUTPATIENT
Start: 2017-07-20 | End: 2017-07-23 | Stop reason: HOSPADM

## 2017-07-20 RX ORDER — HYDROMORPHONE HYDROCHLORIDE 1 MG/ML
0.5 INJECTION, SOLUTION INTRAMUSCULAR; INTRAVENOUS; SUBCUTANEOUS
Status: COMPLETED | OUTPATIENT
Start: 2017-07-20 | End: 2017-07-20

## 2017-07-20 RX ADMIN — ACETAMINOPHEN 1000 MG: 500 TABLET ORAL at 02:07

## 2017-07-20 RX ADMIN — ONDANSETRON 4 MG: 2 INJECTION INTRAMUSCULAR; INTRAVENOUS at 10:07

## 2017-07-20 RX ADMIN — ACETAMINOPHEN 650 MG: 325 TABLET ORAL at 11:07

## 2017-07-20 RX ADMIN — ONDANSETRON 8 MG: 2 INJECTION INTRAMUSCULAR; INTRAVENOUS at 06:07

## 2017-07-20 RX ADMIN — TRAZODONE HYDROCHLORIDE 50 MG: 50 TABLET ORAL at 11:07

## 2017-07-20 RX ADMIN — ONDANSETRON 4 MG: 2 INJECTION INTRAMUSCULAR; INTRAVENOUS at 11:07

## 2017-07-20 RX ADMIN — LEVETIRACETAM 500 MG: 5 INJECTION INTRAVENOUS at 07:07

## 2017-07-20 RX ADMIN — SODIUM CHLORIDE 1000 ML: 0.9 INJECTION, SOLUTION INTRAVENOUS at 10:07

## 2017-07-20 RX ADMIN — SODIUM CHLORIDE: 0.9 INJECTION, SOLUTION INTRAVENOUS at 09:07

## 2017-07-20 RX ADMIN — ACETAMINOPHEN 1000 MG: 10 INJECTION, SOLUTION INTRAVENOUS at 09:07

## 2017-07-20 RX ADMIN — LORAZEPAM 1 MG: 2 INJECTION INTRAMUSCULAR; INTRAVENOUS at 06:07

## 2017-07-20 RX ADMIN — HYDROMORPHONE HYDROCHLORIDE 0.5 MG: 1 INJECTION, SOLUTION INTRAMUSCULAR; INTRAVENOUS; SUBCUTANEOUS at 10:07

## 2017-07-20 RX ADMIN — HALOPERIDOL LACTATE 5 MG: 5 INJECTION, SOLUTION INTRAMUSCULAR at 11:07

## 2017-07-20 RX ADMIN — SODIUM CHLORIDE 1000 ML: 0.9 INJECTION, SOLUTION INTRAVENOUS at 02:07

## 2017-07-20 RX ADMIN — ENOXAPARIN SODIUM 40 MG: 100 INJECTION SUBCUTANEOUS at 09:07

## 2017-07-20 RX ADMIN — ONDANSETRON 4 MG: 2 INJECTION INTRAMUSCULAR; INTRAVENOUS at 02:07

## 2017-07-20 NOTE — SUBJECTIVE & OBJECTIVE
Past Medical History:   Diagnosis Date    Anemia     Anemia     Depression     Fatty liver     Hyperlipidemia     Hypertension     Polysubstance abuse     Sarcoidosis of lung     Suicide attempt     Suicide ideation        Past Surgical History:   Procedure Laterality Date    APPENDECTOMY      ESOPHAGOGASTRODUODENOSCOPY  10/7/2016, 11/6/2014    2016 - gastritis, duodenitis, 2014 erosive gastritis    FLEXIBLE SIGMOIDOSCOPY  11/06/2014    colitis    HYSTERECTOMY      mediastenoscopy      TONSILLECTOMY N/A 1970    TUBAL LIGATION         Review of patient's allergies indicates:   Allergen Reactions    Fentanyl Other (See Comments)     Other reaction(s): Itching    Lortab  [hydrocodone-acetaminophen] Other (See Comments)    Phenothiazines     Promethazine Other (See Comments)     Other reaction(s): Itching    Promethazine hcl Hives       No current facility-administered medications on file prior to encounter.      Current Outpatient Prescriptions on File Prior to Encounter   Medication Sig    baclofen (LIORESAL) 10 MG tablet Take 10 mg by mouth 3 (three) times daily as needed (for spasms).     busPIRone (BUSPAR) 15 MG tablet Take 1 tablet by mouth 3 (three) times daily.     ciprofloxacin HCl (CIPRO) 500 MG tablet Take 1 tablet (500 mg total) by mouth 2 (two) times daily.    desvenlafaxine (PRISTIQ) 100 MG 24 hr tablet Take 1 tablet by mouth once daily.     dicyclomine (BENTYL) 20 mg tablet Take 1 tablet (20 mg total) by mouth 2 (two) times daily.    LIPASE/PROTEASE/AMYLASE (PANCRELIPASE EC ORAL) Take by mouth.    metronidazole (FLAGYL) 500 MG tablet Take 1 tablet (500 mg total) by mouth 3 (three) times daily.    ondansetron (ZOFRAN ODT) 4 MG TbDL Take 1 tablet (4 mg total) by mouth every 8 (eight) hours as needed (nausea).    pantoprazole (PROTONIX) 40 MG tablet Take 40 mg by mouth once daily.    trazodone (DESYREL) 100 MG tablet Take 200 mg by mouth every evening.     vitamin D 1000  units Tab Take 1,000 Units by mouth every evening.     [DISCONTINUED] morphine (VAN) 10 mg 24 hr capsule Take 10 mg by mouth 2 (two) times daily.     Family History     Problem Relation (Age of Onset)    Breast cancer Maternal Aunt, Daughter    Colon cancer Maternal Uncle        Social History Main Topics    Smoking status: Current Every Day Smoker     Packs/day: 1.00     Years: 30.00     Types: Cigarettes    Smokeless tobacco: Never Used    Alcohol use No    Drug use: No    Sexual activity: Yes     Birth control/ protection: Surgical     Review of Systems   Constitutional: Negative.  Negative for chills, fever and unexpected weight change.   HENT: Negative for dental problem, ear pain and trouble swallowing.    Eyes: Negative.    Respiratory: Negative for cough, shortness of breath and wheezing.    Cardiovascular: Negative for chest pain and leg swelling.   Gastrointestinal: Positive for abdominal pain, nausea and vomiting. Negative for abdominal distention and diarrhea.   Endocrine: Negative for polydipsia and polyphagia.   Genitourinary: Negative for dysuria and flank pain.   Musculoskeletal: Negative for arthralgias, myalgias, neck pain and neck stiffness.   Skin: Negative for rash.   Allergic/Immunologic: Negative.    Neurological: Negative for syncope, weakness and headaches.   Psychiatric/Behavioral: Negative for agitation and behavioral problems.   All other systems reviewed and are negative.    Objective:     Vital Signs (Most Recent):  Temp: 98.8 °F (37.1 °C) (07/20/17 0917)  Pulse: 66 (07/20/17 1319)  Resp: 16 (07/20/17 1319)  BP: (!) 178/77 (07/20/17 1319)  SpO2: 95 % (07/20/17 1319) Vital Signs (24h Range):  Temp:  [98.8 °F (37.1 °C)] 98.8 °F (37.1 °C)  Pulse:  [66-70] 66  Resp:  [16-21] 16  SpO2:  [95 %-99 %] 95 %  BP: (158-191)/() 178/77     Weight: 54.4 kg (120 lb)  Body mass index is 19.37 kg/m².    Physical Exam   Constitutional: She is oriented to person, place, and time. She  appears well-developed and well-nourished.  Non-toxic appearance. She does not have a sickly appearance.   HENT:   Head: Normocephalic and atraumatic.   Right Ear: Hearing normal.   Left Ear: Hearing normal.   Mouth/Throat: Oropharynx is clear and moist and mucous membranes are normal.   Eyes: Conjunctivae and EOM are normal. Pupils are equal, round, and reactive to light.   Right periorbital ecchymosis, no proptosis, pupil round, reactive   Neck: Normal range of motion. Neck supple. Carotid bruit is not present. No Brudzinski's sign and no Kernig's sign noted.   Cardiovascular: Normal rate, regular rhythm, S1 normal, S2 normal, normal heart sounds, intact distal pulses and normal pulses.  Exam reveals no gallop and no S3.    No murmur heard.  Pulmonary/Chest: Effort normal and breath sounds normal. No accessory muscle usage. No respiratory distress. She has no wheezes. She has no rales.   Abdominal: Soft. Normal appearance and bowel sounds are normal. She exhibits no distension and no pulsatile midline mass. There is no hepatosplenomegaly. There is tenderness.   Tender to superficial palpation, soft, normal bowel sounds   Musculoskeletal: Normal range of motion.   Neurological: She is alert and oriented to person, place, and time. She has normal strength and normal reflexes. No cranial nerve deficit or sensory deficit. GCS eye subscore is 4. GCS verbal subscore is 5. GCS motor subscore is 6.   Skin: Skin is warm, dry and intact. Capillary refill takes less than 2 seconds. No rash noted.   Psychiatric: She has a normal mood and affect. Her speech is normal and behavior is normal. Judgment and thought content normal. Cognition and memory are normal.   Nursing note and vitals reviewed.       Significant Labs:   Recent Lab Results       07/20/17  1417 07/20/17  1406 07/20/17  1116 07/20/17  0955      Alcohol, Urine  <10       Benzodiazepines  Presumptive Positive       Methadone metabolites  Negative        Phencyclidine  Negative       Albumin    4.0     Alcohol, Medical, Serum <10        Alkaline Phosphatase    90     ALT    71(H)     Amphetamine Screen, Ur  Negative       Anion Gap    14     Appearance, UA   Clear      AST    59(H)     Barbiturate Screen, Ur  Negative       Baso #    0.02     Basophil%    0.2     Bilirubin (UA)   Negative      Total Bilirubin    1.1  Comment:  For infants and newborns, interpretation of results should be based  on gestational age, weight and in agreement with clinical  observations.  Premature Infant recommended reference ranges:  Up to 24 hours.............<8.0 mg/dL  Up to 48 hours............<12.0 mg/dL  3-5 days..................<15.0 mg/dL  6-29 days.................<15.0 mg/dL  (H)     BUN, Bld    5(L)     Calcium    9.8     Chloride    104     CO2    24     Cocaine (Metab.)  Negative       Color, UA   Yellow      Creatinine    0.8     Creatinine, Random Ur  58.0  Comment:  The random urine reference ranges provided were established   for 24 hour urine collections.  No reference ranges exist for  random urine specimens.  Correlate clinically.         Differential Method    Automated     eGFR if     >60     eGFR if non     >60  Comment:  Calculation used to obtain the estimated glomerular filtration  rate (eGFR) is the CKD-EPI equation. Since race is unknown   in our information system, the eGFR values for   -American and Non--American patients are given   for each creatinine result.       Eos #    0.1     Eosinophil%    0.7     Glucose    106     Glucose, UA   Negative      Gran #    5.5     Gran%    61.3     Hematocrit    41.2     Hemoglobin    14.0     Ketones, UA   Negative      Leukocytes, UA   Negative      Lipase    12     Lymph #    2.5     Lymph%    27.7     MCH    33.0(H)     MCHC    34.0     MCV    97     Mono #    0.9     Mono%    9.9     MPV    10.8     Nitrite, UA   Negative      Occult Blood UA   Negative      Opiate  Scrn, Ur  Negative       pH, UA   7.0      Platelets    254     Potassium    3.1(L)     Total Protein    7.7     Protein, UA   Negative  Comment:  Recommend a 24 hour urine protein or a urine   protein/creatinine ratio if globulin induced proteinuria is  clinically suspected.        RBC    4.24     RDW    14.1     Sodium    142     Specific [place], UA   1.015      Specimen UA   Urine, Clean Catch      Marijuana (THC) Metabolite  Negative       Toxicology Information  SEE COMMENT  Comment:  This screen includes the following classes of drugs at the   listed cut-off:  Benzodiazepines                  200 ng/ml  Methadone                        300 ng/ml  Cocaine metabolite               300 ng/ml  Opiates                          300 ng/ml  Barbiturates                     200 ng/ml  Amphetamines                    1000 ng/ml  Marijuana metabs (THC)            50 ng/ml  Phencyclidine (PCP)               25 ng/ml  High concentrations of Diphenhydramine may cross-react with  Phencyclidine PCP screening immunoassay giving a false   positive result.  High concentrations of Methylenedioxymethamphetamine (MDMA aka  Ectasy) and other structurally similar compounds may cross-   react with the Amphetamine/Methamphetamine screening   immunoassay giving a false positive result.  A metabolite of the anti-HIV drug Sustiva () may cause  false positive results in the Marijuana metabolite (THC)   screening assay.  Note: This exception list includes only more common   interferants in toxicology screen testing.  Because of many   cross-reactantspositive results on toxicology drug screens   should be confirmed whenever results do not correlate with   clinical presentation.  This report is intended for use in clinical monitoring and  management of patients. It is not intended for use in   employment related drug testing.  Because of any cross-reactants, positive results on toxicology  drug screens should be confirmed whenever  results do not  correlate with clinical presentation.  Presumptive positive results are unconfirmed and may be used   only for medical purposes.         Urobilinogen, UA   Negative      WBC    9.02           Significant Imaging: I have reviewed and interpreted all pertinent imaging results/findings within the past 24 hours.CT abdomen essentially unremarkable

## 2017-07-20 NOTE — ASSESSMENT & PLAN NOTE
I suspect this is a manifestation of narcotic withdrawal.  Soft abdomen, normal studies.  She is out of her chronic narcotic.  I suggested she see her pain management physician across the street, who was notified

## 2017-07-20 NOTE — PLAN OF CARE
07/20/17 1505   ED Admissions Case Approval   ED Admissions Case Approval (!) CM Approved  (OBS )

## 2017-07-20 NOTE — HOSPITAL COURSE
No further seizure activity  Complains of blurred vision and headache but improving  MRI reveals PRES syndrome, presumably due to hypertension.  She was previously hypertensive but off meds for some time    Seen by neurology.  Started on keppra, no further seizures  Blood pressure controlled with toprol, hctz    Will set up with neurology in 4 weeks for repeat mri, check electrolytes 2 weeks  No driving

## 2017-07-20 NOTE — ED NOTES
Pt lying in stretcher.  at bedside. Pt states pain level 6/10. Pt comfort and restroom needs addressed. VS monitoring in progress. Call light within reach. Will continue to monitor.

## 2017-07-20 NOTE — ED TRIAGE NOTES
Pt c/o N/V/D with mid upper & lower quadrant abdominal pain X 4 days (Mon). Pt seen at [facility] & prescribed Bentyl, Cipro& FLagyl for lower GI infection. Pt reports that she has been compliant with medication, however, symptoms have worsened. Pt reports that she cannot hold down sips of water. Pt reports that she fell last night, hitting her right eye. Pt has ecchymosis of the & swelling of the right eye.

## 2017-07-20 NOTE — ED NOTES
"Pt's  pushing pt in ED hallway by nursing station saying," She won't answer me and she will not stop shaking". Pt actively seizing. Pt immediatly transferred per wheelchair to ED bed 4, Dr. Fox at bedside assessing pt. Patient assisted onto stretcher seizure precautions indicated, pt changed into a gown. Patient placed on continuous oxygen @ 2liters via NC, cardiac monitor, continuous pulse oximetry and automatic blood pressure cuff. Bed placed in low locked position, side rails up x 2, call light is within reach of patient or family, orientation to room and explanation of wait provided to family and patient, alarms set and turned on for monitor and pulse ox,  remains at bedside. Will continue to monitor.   "

## 2017-07-20 NOTE — H&P
"[facility] Hospital Medicine  History & Physical    Patient Name: Earl Abdul  MRN: [id number]  Admission Date: 7/20/2017  Attending Physician: Tara Moeller MD   Primary Care Provider: Dorota Galvin MD         Patient information was obtained from patient and ER records.     Subjective:     Principal Problem:Abdominal pain    Chief Complaint:   Chief Complaint   Patient presents with    Abdominal Pain     C/o mid abd pain with N/V/D. Was seen at [place] on Tuesday for same s/s. Diagnosed with irritation to lower bowel.        HPI:   Abdominal Pain        C/o mid abd pain with N/V/D. Was seen at [place] on Tuesday for same s/s. Diagnosed with irritation to lower bowel.      Patient is a 57-year-old female hx of anemia, depression, fatty liver, hyperlipidemia, hypertension, polysubstance abuse, sarcoidosis of lung, and previous chronic abdominal pain who presents to the emergency department with abdominal cramping.  Patient  accompanies her.  Patient refuses to speak with me initially.  She states she is in too much pain.   states since Monday she has been having abdominal cramping.  He states this is more severe than typically.  He states that she has had nausea and vomiting with diarrhea.  They deny any blood in stool.  He denies any fevers.  He states they were seen in another emergency department just the other day and was told that she had "swelling in her intestines."  Patient and  state the only medicine that works as a medicine that starts with "d."   also states that she was in so much pain last night that she fell hitting her face on a door frame.  They report unknown loss of consciousness.  Patient reports swelling and bruising to the right eye.  She denies any visual disturbance.  She reports her abdominal pain 10 out of 10.  She denies any urinary symptoms.  She states she has not drink alcohol in several weeks.         Past Medical History: "   Diagnosis Date    Anemia     Anemia     Depression     Fatty liver     Hyperlipidemia     Hypertension     Polysubstance abuse     Sarcoidosis of lung     Suicide attempt     Suicide ideation        Past Surgical History:   Procedure Laterality Date    APPENDECTOMY      ESOPHAGOGASTRODUODENOSCOPY  10/7/2016, 11/6/2014    2016 - gastritis, duodenitis, 2014 erosive gastritis    FLEXIBLE SIGMOIDOSCOPY  11/06/2014    colitis    HYSTERECTOMY      mediastenoscopy      TONSILLECTOMY N/A 1970    TUBAL LIGATION         Review of patient's allergies indicates:   Allergen Reactions    Fentanyl Other (See Comments)     Other reaction(s): Itching    Lortab  [hydrocodone-acetaminophen] Other (See Comments)    Phenothiazines     Promethazine Other (See Comments)     Other reaction(s): Itching    Promethazine hcl Hives       No current facility-administered medications on file prior to encounter.      Current Outpatient Prescriptions on File Prior to Encounter   Medication Sig    baclofen (LIORESAL) 10 MG tablet Take 10 mg by mouth 3 (three) times daily as needed (for spasms).     busPIRone (BUSPAR) 15 MG tablet Take 1 tablet by mouth 3 (three) times daily.     ciprofloxacin HCl (CIPRO) 500 MG tablet Take 1 tablet (500 mg total) by mouth 2 (two) times daily.    desvenlafaxine (PRISTIQ) 100 MG 24 hr tablet Take 1 tablet by mouth once daily.     dicyclomine (BENTYL) 20 mg tablet Take 1 tablet (20 mg total) by mouth 2 (two) times daily.    LIPASE/PROTEASE/AMYLASE (PANCRELIPASE EC ORAL) Take by mouth.    metronidazole (FLAGYL) 500 MG tablet Take 1 tablet (500 mg total) by mouth 3 (three) times daily.    ondansetron (ZOFRAN ODT) 4 MG TbDL Take 1 tablet (4 mg total) by mouth every 8 (eight) hours as needed (nausea).    pantoprazole (PROTONIX) 40 MG tablet Take 40 mg by mouth once daily.    trazodone (DESYREL) 100 MG tablet Take 200 mg by mouth every evening.     vitamin D 1000 units Tab Take 1,000 Units  by mouth every evening.     [DISCONTINUED] morphine (VAN) 10 mg 24 hr capsule Take 10 mg by mouth 2 (two) times daily.     Family History     Problem Relation (Age of Onset)    Breast cancer Maternal Aunt, Daughter    Colon cancer Maternal Uncle        Social History Main Topics    Smoking status: Current Every Day Smoker     Packs/day: 1.00     Years: 30.00     Types: Cigarettes    Smokeless tobacco: Never Used    Alcohol use No    Drug use: No    Sexual activity: Yes     Birth control/ protection: Surgical     Review of Systems   Constitutional: Negative.  Negative for chills, fever and unexpected weight change.   HENT: Negative for dental problem, ear pain and trouble swallowing.    Eyes: Negative.    Respiratory: Negative for cough, shortness of breath and wheezing.    Cardiovascular: Negative for chest pain and leg swelling.   Gastrointestinal: Positive for abdominal pain, nausea and vomiting. Negative for abdominal distention and diarrhea.   Endocrine: Negative for polydipsia and polyphagia.   Genitourinary: Negative for dysuria and flank pain.   Musculoskeletal: Negative for arthralgias, myalgias, neck pain and neck stiffness.   Skin: Negative for rash.   Allergic/Immunologic: Negative.    Neurological: Negative for syncope, weakness and headaches.   Psychiatric/Behavioral: Negative for agitation and behavioral problems.   All other systems reviewed and are negative.    Objective:     Vital Signs (Most Recent):  Temp: 98.8 °F (37.1 °C) (07/20/17 0917)  Pulse: 66 (07/20/17 1319)  Resp: 16 (07/20/17 1319)  BP: (!) 178/77 (07/20/17 1319)  SpO2: 95 % (07/20/17 1319) Vital Signs (24h Range):  Temp:  [98.8 °F (37.1 °C)] 98.8 °F (37.1 °C)  Pulse:  [66-70] 66  Resp:  [16-21] 16  SpO2:  [95 %-99 %] 95 %  BP: (158-191)/() 178/77     Weight: 54.4 kg (120 lb)  Body mass index is 19.37 kg/m².    Physical Exam   Constitutional: She is oriented to person, place, and time. She appears well-developed and  well-nourished.  Non-toxic appearance. She does not have a sickly appearance.   HENT:   Head: Normocephalic and atraumatic.   Right Ear: Hearing normal.   Left Ear: Hearing normal.   Mouth/Throat: Oropharynx is clear and moist and mucous membranes are normal.   Eyes: Conjunctivae and EOM are normal. Pupils are equal, round, and reactive to light.   Right periorbital ecchymosis, no proptosis, pupil round, reactive   Neck: Normal range of motion. Neck supple. Carotid bruit is not present. No Brudzinski's sign and no Kernig's sign noted.   Cardiovascular: Normal rate, regular rhythm, S1 normal, S2 normal, normal heart sounds, intact distal pulses and normal pulses.  Exam reveals no gallop and no S3.    No murmur heard.  Pulmonary/Chest: Effort normal and breath sounds normal. No accessory muscle usage. No respiratory distress. She has no wheezes. She has no rales.   Abdominal: Soft. Normal appearance and bowel sounds are normal. She exhibits no distension and no pulsatile midline mass. There is no hepatosplenomegaly. There is tenderness.   Tender to superficial palpation, soft, normal bowel sounds   Musculoskeletal: Normal range of motion.   Neurological: She is alert and oriented to person, place, and time. She has normal strength and normal reflexes. No cranial nerve deficit or sensory deficit. GCS eye subscore is 4. GCS verbal subscore is 5. GCS motor subscore is 6.   Skin: Skin is warm, dry and intact. Capillary refill takes less than 2 seconds. No rash noted.   Psychiatric: She has a normal mood and affect. Her speech is normal and behavior is normal. Judgment and thought content normal. Cognition and memory are normal.   Nursing note and vitals reviewed.       Significant Labs:   Recent Lab Results       07/20/17  1417 07/20/17  1406 07/20/17  1116 07/20/17  0955      Alcohol, Urine  <10       Benzodiazepines  Presumptive Positive       Methadone metabolites  Negative       Phencyclidine  Negative       Albumin     4.0     Alcohol, Medical, Serum <10        Alkaline Phosphatase    90     ALT    71(H)     Amphetamine Screen, Ur  Negative       Anion Gap    14     Appearance, UA   Clear      AST    59(H)     Barbiturate Screen, Ur  Negative       Baso #    0.02     Basophil%    0.2     Bilirubin (UA)   Negative      Total Bilirubin    1.1  Comment:  For infants and newborns, interpretation of results should be based  on gestational age, weight and in agreement with clinical  observations.  Premature Infant recommended reference ranges:  Up to 24 hours.............<8.0 mg/dL  Up to 48 hours............<12.0 mg/dL  3-5 days..................<15.0 mg/dL  6-29 days.................<15.0 mg/dL  (H)     BUN, Bld    5(L)     Calcium    9.8     Chloride    104     CO2    24     Cocaine (Metab.)  Negative       Color, UA   Yellow      Creatinine    0.8     Creatinine, Random Ur  58.0  Comment:  The random urine reference ranges provided were established   for 24 hour urine collections.  No reference ranges exist for  random urine specimens.  Correlate clinically.         Differential Method    Automated     eGFR if     >60     eGFR if non     >60  Comment:  Calculation used to obtain the estimated glomerular filtration  rate (eGFR) is the CKD-EPI equation. Since race is unknown   in our information system, the eGFR values for   -American and Non--American patients are given   for each creatinine result.       Eos #    0.1     Eosinophil%    0.7     Glucose    106     Glucose, UA   Negative      Gran #    5.5     Gran%    61.3     Hematocrit    41.2     Hemoglobin    14.0     Ketones, UA   Negative      Leukocytes, UA   Negative      Lipase    12     Lymph #    2.5     Lymph%    27.7     MCH    33.0(H)     MCHC    34.0     MCV    97     Mono #    0.9     Mono%    9.9     MPV    10.8     Nitrite, UA   Negative      Occult Blood UA   Negative      Opiate Scrn, Ur  Negative       pH, UA   7.0       Platelets    254     Potassium    3.1(L)     Total Protein    7.7     Protein, UA   Negative  Comment:  Recommend a 24 hour urine protein or a urine   protein/creatinine ratio if globulin induced proteinuria is  clinically suspected.        RBC    4.24     RDW    14.1     Sodium    142     Specific [place], UA   1.015      Specimen UA   Urine, Clean Catch      Marijuana (THC) Metabolite  Negative       Toxicology Information  SEE COMMENT  Comment:  This screen includes the following classes of drugs at the   listed cut-off:  Benzodiazepines                  200 ng/ml  Methadone                        300 ng/ml  Cocaine metabolite               300 ng/ml  Opiates                          300 ng/ml  Barbiturates                     200 ng/ml  Amphetamines                    1000 ng/ml  Marijuana metabs (THC)            50 ng/ml  Phencyclidine (PCP)               25 ng/ml  High concentrations of Diphenhydramine may cross-react with  Phencyclidine PCP screening immunoassay giving a false   positive result.  High concentrations of Methylenedioxymethamphetamine (MDMA aka  Ectasy) and other structurally similar compounds may cross-   react with the Amphetamine/Methamphetamine screening   immunoassay giving a false positive result.  A metabolite of the anti-HIV drug Sustiva () may cause  false positive results in the Marijuana metabolite (THC)   screening assay.  Note: This exception list includes only more common   interferants in toxicology screen testing.  Because of many   cross-reactantspositive results on toxicology drug screens   should be confirmed whenever results do not correlate with   clinical presentation.  This report is intended for use in clinical monitoring and  management of patients. It is not intended for use in   employment related drug testing.  Because of any cross-reactants, positive results on toxicology  drug screens should be confirmed whenever results do not  correlate with clinical  presentation.  Presumptive positive results are unconfirmed and may be used   only for medical purposes.         Urobilinogen, UA   Negative      WBC    9.02           Significant Imaging: I have reviewed and interpreted all pertinent imaging results/findings within the past 24 hours.CT abdomen essentially unremarkable    Assessment/Plan:     Substance abuse              Tobacco abuse    Counseled to cease          * Abdominal pain    I suspect this is a manifestation of narcotic withdrawal.  Soft abdomen, normal studies.  She is out of her chronic narcotic.  I suggested she see her pain management physician across the street, who was notified            VTE Risk Mitigation     None        Cliff Smith PA-C  Department of Hospital Medicine   [facility]

## 2017-07-20 NOTE — DISCHARGE SUMMARY
"[facility] Medicine  Discharge Summary      Patient Name: Earl Abdul  MRN: [id number]  Admission Date: 7/20/2017  Hospital Length of Stay: 0 days  Discharge Date and Time:  07/20/2017 4:51 PM  Attending Physician: Tara Moeller MD   Discharging Provider: Cliff Smith PA-C  Primary Care Provider: Dorota Galvin MD      HPI:   Abdominal Pain        C/o mid abd pain with N/V/D. Was seen at [place] on Tuesday for same s/s. Diagnosed with irritation to lower bowel.      Patient is a 57-year-old female hx of anemia, depression, fatty liver, hyperlipidemia, hypertension, polysubstance abuse, sarcoidosis of lung, and previous chronic abdominal pain who presents to the emergency department with abdominal cramping.  Patient  accompanies her.  Patient refuses to speak with me initially.  She states she is in too much pain.   states since Monday she has been having abdominal cramping.  He states this is more severe than typically.  He states that she has had nausea and vomiting with diarrhea.  They deny any blood in stool.  He denies any fevers.  He states they were seen in another emergency department just the other day and was told that she had "swelling in her intestines."  Patient and  state the only medicine that works as a medicine that starts with "d."   also states that she was in so much pain last night that she fell hitting her face on a door frame.  They report unknown loss of consciousness.  Patient reports swelling and bruising to the right eye.  She denies any visual disturbance.  She reports her abdominal pain 10 out of 10.  She denies any urinary symptoms.  She states she has not drink alcohol in several weeks.         * No surgery found *      Indwelling Lines/Drains at time of discharge:   Lines/Drains/Airways          No matching active lines, drains, or airways        Hospital Course:   Pt states she has had symptoms for five days, " but told ED at [place] they had been present for two weeks.  She is out of morphine prescribed by pain management, 10 days early.  She can not explain this    CT, labs, vitals normal.  I explained to Ms Abdul I did not see evidence that she requirees hospitalization and she voiced understanding and requested discharge     Consults:     Significant Diagnostic Studies: Labs:   BMP:   Recent Labs  Lab 07/18/17  2004 07/20/17  0955   GLU 85 106    142   K 3.1* 3.1*   CL 99 104   CO2 26 24   BUN 3* 5*   CREATININE 0.7 0.8   CALCIUM 9.2 9.8   , CMP   Recent Labs  Lab 07/18/17  2004 07/20/17  0955    142   K 3.1* 3.1*   CL 99 104   CO2 26 24   GLU 85 106   BUN 3* 5*   CREATININE 0.7 0.8   CALCIUM 9.2 9.8   PROT 7.3 7.7   ALBUMIN 3.9 4.0   BILITOT 0.9 1.1*   ALKPHOS 89 90   AST 55* 59*   ALT 69* 71*   ANIONGAP 14 14   ESTGFRAFRICA >60 >60   EGFRNONAA >60 >60    and CBC   Recent Labs  Lab 07/18/17 2004 07/19/17  1017 07/20/17  0955   WBC 9.67 6.38 9.02   HGB 13.3 12.4 14.0   HCT 39.2 38.0 41.2    191 254       Pending Diagnostic Studies:     None        Final Active Diagnoses:    Diagnosis Date Noted POA    PRINCIPAL PROBLEM:  Abdominal pain [R10.9] 07/20/2017 Yes    Substance abuse [F19.10] 10/15/2014 Yes    Tobacco abuse [Z72.0] 02/07/2014 Yes      Problems Resolved During this Admission:    Diagnosis Date Noted Date Resolved POA      Substance abuse              Tobacco abuse    Counseled to cease          * Abdominal pain    I suspect this is a manifestation of narcotic withdrawal.  Soft abdomen, normal studies.  She is out of her chronic narcotic.  I suggested she see her pain management physician across the street, who was notified              Discharged Condition: good    Disposition: Home or Self Care    Follow Up:  Follow-up Information     Dorota Galvin MD In 1 day.    Specialty:  Internal Medicine  Contact information:  [facility] [id number] [phone]                  Patient Instructions:     Diet general     Activity as tolerated       Medications:  Reconciled Home Medications:   Current Discharge Medication List      CONTINUE these medications which have NOT CHANGED    Details   baclofen (LIORESAL) 10 MG tablet Take 10 mg by mouth 3 (three) times daily as needed (for spasms).   Refills: 0      busPIRone (BUSPAR) 15 MG tablet Take 1 tablet by mouth 3 (three) times daily.       ciprofloxacin HCl (CIPRO) 500 MG tablet Take 1 tablet (500 mg total) by mouth 2 (two) times daily.  Qty: 14 tablet, Refills: 0      desvenlafaxine (PRISTIQ) 100 MG 24 hr tablet Take 1 tablet by mouth once daily.       dicyclomine (BENTYL) 20 mg tablet Take 1 tablet (20 mg total) by mouth 2 (two) times daily.  Qty: 20 tablet, Refills: 0      LIPASE/PROTEASE/AMYLASE (PANCRELIPASE EC ORAL) Take by mouth.      metronidazole (FLAGYL) 500 MG tablet Take 1 tablet (500 mg total) by mouth 3 (three) times daily.  Qty: 14 tablet, Refills: 0      ondansetron (ZOFRAN ODT) 4 MG TbDL Take 1 tablet (4 mg total) by mouth every 8 (eight) hours as needed (nausea).  Qty: 12 tablet, Refills: 0      pantoprazole (PROTONIX) 40 MG tablet Take 40 mg by mouth once daily.      trazodone (DESYREL) 100 MG tablet Take 200 mg by mouth every evening.       vitamin D 1000 units Tab Take 1,000 Units by mouth every evening.            Time spent on the discharge of patient: 60   minutes    Cliff Smith PA-C  Department of Hospital Medicine  [facility]

## 2017-07-20 NOTE — ED PROVIDER NOTES
"Encounter Date: 7/20/2017       History     Chief Complaint   Patient presents with    Abdominal Pain     C/o mid abd pain with N/V/D. Was seen at [facility] on Tuesday for same s/s. Diagnosed with irritation to lower bowel.     Patient is a 57-year-old female hx of anemia, depression, fatty liver, hyperlipidemia, hypertension, polysubstance abuse, sarcoidosis of lung, and previous chronic abdominal pain who presents to the emergency department with abdominal cramping.  Patient  accompanies her.  Patient refuses to speak with me initially.  She states she is in too much pain.   states since Monday she has been having abdominal cramping.  He states this is more severe than typically.  He states that she has had nausea and vomiting with diarrhea.  They deny any blood in stool.  He denies any fevers.  He states they were seen in another emergency department just the other day and was told that she had "swelling in her intestines."  Patient and  state the only medicine that works as a medicine that starts with "d."   also states that she was in so much pain last night that she fell hitting her face on a door frame.  They report unknown loss of consciousness.  Patient reports swelling and bruising to the right eye.  She denies any visual disturbance.  She reports her abdominal pain 10 out of 10.  She denies any urinary symptoms.  She states she has not drink alcohol in several weeks.      The history is provided by the patient.     Review of patient's allergies indicates:   Allergen Reactions    Fentanyl Other (See Comments)     Other reaction(s): Itching    Lortab  [hydrocodone-acetaminophen] Other (See Comments)    Phenothiazines     Promethazine Other (See Comments)     Other reaction(s): Itching    Promethazine hcl Hives     Past Medical History:   Diagnosis Date    Anemia     Anemia     Depression     Fatty liver     Hyperlipidemia     Hypertension     Polysubstance abuse     " Sarcoidosis of lung     Suicide attempt     Suicide ideation      Past Surgical History:   Procedure Laterality Date    APPENDECTOMY      HYSTERECTOMY      mediastenoscopy      TONSILLECTOMY N/A 1970    TUBAL LIGATION       Family History   Problem Relation Age of Onset    Breast cancer Maternal Aunt     Colon cancer Maternal Uncle     Breast cancer Daughter     Ovarian cancer Neg Hx      Social History   Substance Use Topics    Smoking status: Current Every Day Smoker     Packs/day: 1.00     Years: 30.00     Types: Cigarettes    Smokeless tobacco: Never Used    Alcohol use No     Review of Systems   Constitutional: Negative for activity change, appetite change, chills, fatigue and fever.   HENT: Negative for congestion, ear discharge, ear pain, nosebleeds, postnasal drip, rhinorrhea, sore throat and trouble swallowing.    Respiratory: Negative for cough and shortness of breath.    Cardiovascular: Negative for chest pain.   Gastrointestinal: Positive for abdominal pain, diarrhea, nausea and vomiting. Negative for blood in stool and constipation.   Genitourinary: Negative for dysuria, flank pain and genital sores.   Musculoskeletal: Negative for back pain, neck pain and neck stiffness.   Skin: Negative for rash and wound.   Neurological: Negative for dizziness, syncope, speech difficulty, weakness, light-headedness, numbness and headaches.       Physical Exam     Initial Vitals [07/20/17 0917]   BP Pulse Resp Temp SpO2   (!) 158/103 70 18 98.8 °F (37.1 °C) 99 %      MAP       121.33         Physical Exam    Nursing note and vitals reviewed.  Constitutional: She appears well-developed and well-nourished. She is not diaphoretic.  Non-toxic appearance. No distress.   HENT:   Head: Normocephalic.   Right Ear: Hearing and external ear normal.   Left Ear: Hearing and external ear normal.   Nose: Nose normal.   Mouth/Throat: Uvula is midline, oropharynx is clear and moist and mucous membranes are normal.  Mucous membranes are not pale. No trismus in the jaw. No uvula swelling. No oropharyngeal exudate.   Eyes: Conjunctivae are normal. Pupils are equal, round, and reactive to light.   Neck: Normal range of motion.   Cardiovascular: Normal rate and regular rhythm.   Pulmonary/Chest: Breath sounds normal. No respiratory distress. She has no wheezes. She has no rhonchi. She has no rales. She exhibits no tenderness.   Abdominal: Soft. Bowel sounds are normal. She exhibits no distension and no mass. There is tenderness. There is no rebound, no guarding, no CVA tenderness, no tenderness at McBurney's point and negative Mccollum's sign.   Lymphadenopathy:     She has no cervical adenopathy.   Neurological: She is alert and oriented to person, place, and time.   Pt refuses full neuro exam.   Skin: Skin is warm and dry. Capillary refill takes less than 2 seconds.   Psychiatric: She has a normal mood and affect.         ED Course   Procedures  Labs Reviewed   CBC W/ AUTO DIFFERENTIAL - Abnormal; Notable for the following:        Result Value    MCH 33.0 (*)     All other components within normal limits   COMPREHENSIVE METABOLIC PANEL   LIPASE   URINALYSIS         Imaging Results          CT Maxillofacial Without Contrast (Final result)  Result time 07/20/17 11:04:50    Final result by Salbador Vaughan MD (07/20/17 11:04:50)                 Impression:      No evidence for acute fracture or bone destruction.        Electronically signed by: SALBADOR VAUGHAN MD  Date:     07/20/17  Time:    11:04              Narrative:    Contiguous 2.5 mm noncontrast axial images were obtained through the facial bones with both coronal and sagittal reconstructions of the facial bones obtained.    The bones are intact.  There is no evidence for acute fracture or bone destruction.  Specifically, the bony orbits are intact.  The mastoid air cells and visualized paranasal sinuses are clear.  Soft tissues are unremarkable.                             CT  Head Without Contrast (Final result)  Result time 07/20/17 10:59:58    Final result by Salbador Vaughan MD (07/20/17 10:59:58)                 Impression:      No acute intracranial abnormalities are identified.  No evidence for intracranial hemorrhage.  Mild atrophy commensurate with the patient's chronological age.      Electronically signed by: SALBADOR VAUGHAN MD  Date:     07/20/17  Time:    10:59              Narrative:    Contiguous 5 mm noncontrast axial images were obtained through the brain with both coronal and sagittal reconstructions of the brain obtained.    The ventricles are mildly enlarged and the sulci are prominent consistent with mild generalized atrophy commensurate with the patient's chronological age.  There is no evidence for abnormal masses, mass effect, or midline shift.  No abnormal intra- or extra-axial fluid collections are identified, specifically there is no evidence for intracranial hemorrhage.  Mastoid air cells and visualized paranasal sinuses are clear.  Bony calvarium is intact.                                   Medical Decision Making:   Initial Assessment:   Urgent evaluation of a 57-year-old female sure he of anemia, depression, fatty liver, hyperlipidemia, hypertension, polysubstance abuse, sarcoidosis of lung, and chronic abdominal pain who presents to the emergency department with abdominal cramping.  Patient is not willing to participate with full exam.  She declines neuro exam.  She states that she does not want me pushing on her abdomen.  After calming patient down, she'll labs need to perform abdominal exam.  She is mild diffuse abdominal tenderness with deep palpation.  No CVA tenderness.  Moist mucous membranes.  She is afebrile, nontoxic appearing, and hemodynamically stable.  She does have right periorbital edema and ecchymosis.  With patient's unknown status of loss of consciousness with fall last night, I will head CT and CT maxillofacial.  Lab work will be obtained  to rule out significant kidney insufficiency or electrolyte disturbance.  Lipase will be obtained.  Patient be given fluids, antiemetics, and analgesic.  Clinical Tests:   Lab Tests: Ordered and Reviewed  ED Management:  10:56 AM  Patient is demanding more Dilaudid.  She states this is the only medication that works for her.  I discussed case with Dr. Keith.  He states he does not feel narcotics are warranted.  He advised lab work to rule out pancreatitis and electrolytic disturbance.  Case is discussed in depth with supervising physician.  Haldol will be given to patient at this time.    1:25 PM  Head CT and CT maxillofacial reveal no acute findings.    After Haldol, pt is feeling improved but states she still has 8/10 pain.  Pt states that she does not feel comfortable being discharged.  She wants to stay in the hospital.  Will discuss case with hospitalist.    1:33 PM  Case is discussed with Dr. Diaz who will admit patient for repeat abdominal exams, pain control, and antiemetics.  Other:   I have discussed this case with another health care provider.       <> Summary of the Discussion: Dr. Finch                   ED Course     Clinical Impression:   The primary encounter diagnosis was Abdominal pain. Diagnoses of Intractable abdominal pain, Intractable vomiting with nausea, unspecified vomiting type, and Hypokalemia were also pertinent to this visit.                           Carolina Ferrera PA-C  07/20/17 0223

## 2017-07-20 NOTE — ED NOTES
Patient Identifiers for Earl Abdul checked and correct  LOC: The patient is awake, alert and aware of environment with an appropriate affect, the patient is oriented x 3 and speaking appropriate.  APPEARANCE: Patient uncomfortable, retching, moaning & restless in stretcher. The patient's clothing is properly fastened.  SKIN: The skin is warm and dry. The patient has normal skin turgor and dry mucus membranes. Ecchymosis & swelling of the right eye, eyelid.  Musculoskeletal :  Normal range of motion noted. Moves all extremities well.  RESPIRATORY: Airway is open and patent, respirations are spontaneous, patient has a normal effort and rate.   ABDOMEN: Soft and tender to palpation all quads, no distention observed. Bowels Sounds are hyperactive in the lower quads.  PULSES: 2+ radial pulses, symmetrical.  Will continue to monitor

## 2017-07-20 NOTE — HPI
Abdominal Pain        C/o mid abd pain with N/V/D. Was seen at [facility] on Tuesday for same s/s. Diagnosed with irritation to lower bowel.     58 yo female discharged from ED yesterday after being seen for abdominal pain that is an ongoing issue.  She had a witnessed tonic clonic seizure in the parking lot, lasting about two minutes.  She has not had seizures before.  She was brought back to ED in a post ictal state.  She has a history of drug and alcohol abuse but claims sobriety for three weeks since entering rehab

## 2017-07-20 NOTE — ED NOTES
"Pt rounding complete. Pain 10/10.  Pt requesting meds for nausea and pain, states meds previously given were "no help at all". Pt tossing and turning in bed appears uncomfortable. Pt updated on plan of care.  at bedside. Will inform PA and continue to monitor.       "

## 2017-07-20 NOTE — ED NOTES
Pt rounding complete. Pain 8/10, she denies any needs at this time.  Restroom, positioning  and comfort needs have been addressed. Pt is resting comfortably in stretcher. She has been updated on plan of care. Will continue to monitor.

## 2017-07-20 NOTE — ED NOTES
"Pt heard screaming from her room. ShiraRN and Inna RN came in to assist. Pt found trembling in stretcher and saying" It won't stop jerking". When asked what she meant, she repeated "It wont stop jerking." Jerking/trembling ceased.No seizure noted. Skin warm and dry to touch.  IV was out. RIO Figueroa notified and came to observe pt. Carolina instructed to notify , the admitting doctor to be called.  notified and instructed to proceed with discharge orders. Pt AAOX3.VS /81 T 98.7 Hr 88 RR 18. Pt helped to stand, gait unsteady and helped to put on clothing. Pt able to follow commands and answer questions.  called and notified of discharge orders, and said he would be on his way. Pt put in wheelchair with chair locked. Pt able to sit straight and steady in wheelchair. Told pt  was contacted and pt said "ok".  "

## 2017-07-21 LAB
ALBUMIN SERPL BCP-MCNC: 3.5 G/DL
ALBUMIN SERPL BCP-MCNC: 3.5 G/DL
ALP SERPL-CCNC: 76 U/L
ALP SERPL-CCNC: 76 U/L
ALT SERPL W/O P-5'-P-CCNC: 59 U/L
ALT SERPL W/O P-5'-P-CCNC: 59 U/L
ANION GAP SERPL CALC-SCNC: 12 MMOL/L
ANION GAP SERPL CALC-SCNC: 12 MMOL/L
AST SERPL-CCNC: 55 U/L
AST SERPL-CCNC: 55 U/L
BASOPHILS # BLD AUTO: 0.01 K/UL
BASOPHILS # BLD AUTO: 0.01 K/UL
BASOPHILS NFR BLD: 0.1 %
BASOPHILS NFR BLD: 0.1 %
BILIRUB SERPL-MCNC: 1 MG/DL
BILIRUB SERPL-MCNC: 1 MG/DL
BUN SERPL-MCNC: 2 MG/DL
BUN SERPL-MCNC: 2 MG/DL
CALCIUM SERPL-MCNC: 8.7 MG/DL
CALCIUM SERPL-MCNC: 8.7 MG/DL
CHLORIDE SERPL-SCNC: 105 MMOL/L
CHLORIDE SERPL-SCNC: 105 MMOL/L
CO2 SERPL-SCNC: 22 MMOL/L
CO2 SERPL-SCNC: 22 MMOL/L
CREAT SERPL-MCNC: 0.6 MG/DL
CREAT SERPL-MCNC: 0.6 MG/DL
DIFFERENTIAL METHOD: ABNORMAL
DIFFERENTIAL METHOD: ABNORMAL
EOSINOPHIL # BLD AUTO: 0 K/UL
EOSINOPHIL # BLD AUTO: 0 K/UL
EOSINOPHIL NFR BLD: 0.3 %
EOSINOPHIL NFR BLD: 0.3 %
ERYTHROCYTE [DISTWIDTH] IN BLOOD BY AUTOMATED COUNT: 13.9 %
ERYTHROCYTE [DISTWIDTH] IN BLOOD BY AUTOMATED COUNT: 13.9 %
EST. GFR  (AFRICAN AMERICAN): >60 ML/MIN/1.73 M^2
EST. GFR  (AFRICAN AMERICAN): >60 ML/MIN/1.73 M^2
EST. GFR  (NON AFRICAN AMERICAN): >60 ML/MIN/1.73 M^2
EST. GFR  (NON AFRICAN AMERICAN): >60 ML/MIN/1.73 M^2
GLUCOSE SERPL-MCNC: 108 MG/DL
GLUCOSE SERPL-MCNC: 108 MG/DL
HCT VFR BLD AUTO: 36.8 %
HCT VFR BLD AUTO: 36.8 %
HGB BLD-MCNC: 12.3 G/DL
HGB BLD-MCNC: 12.3 G/DL
LYMPHOCYTES # BLD AUTO: 2 K/UL
LYMPHOCYTES # BLD AUTO: 2 K/UL
LYMPHOCYTES NFR BLD: 26.3 %
LYMPHOCYTES NFR BLD: 26.3 %
MAGNESIUM SERPL-MCNC: 1.5 MG/DL
MCH RBC QN AUTO: 32.5 PG
MCH RBC QN AUTO: 32.5 PG
MCHC RBC AUTO-ENTMCNC: 33.4 G/DL
MCHC RBC AUTO-ENTMCNC: 33.4 G/DL
MCV RBC AUTO: 97 FL
MCV RBC AUTO: 97 FL
MONOCYTES # BLD AUTO: 0.6 K/UL
MONOCYTES # BLD AUTO: 0.6 K/UL
MONOCYTES NFR BLD: 7.6 %
MONOCYTES NFR BLD: 7.6 %
NEUTROPHILS # BLD AUTO: 4.9 K/UL
NEUTROPHILS # BLD AUTO: 4.9 K/UL
NEUTROPHILS NFR BLD: 65.4 %
NEUTROPHILS NFR BLD: 65.4 %
PHOSPHATE SERPL-MCNC: 3 MG/DL
PLATELET # BLD AUTO: 180 K/UL
PLATELET # BLD AUTO: 180 K/UL
PMV BLD AUTO: 10.8 FL
PMV BLD AUTO: 10.8 FL
POTASSIUM SERPL-SCNC: 2.9 MMOL/L
POTASSIUM SERPL-SCNC: 2.9 MMOL/L
PROT SERPL-MCNC: 6.5 G/DL
PROT SERPL-MCNC: 6.5 G/DL
RBC # BLD AUTO: 3.79 M/UL
RBC # BLD AUTO: 3.79 M/UL
SODIUM SERPL-SCNC: 139 MMOL/L
SODIUM SERPL-SCNC: 139 MMOL/L
WBC # BLD AUTO: 7.41 K/UL
WBC # BLD AUTO: 7.41 K/UL
WBC #/AREA STL HPF: NORMAL /[HPF]

## 2017-07-21 PROCEDURE — 63600175 PHARM REV CODE 636 W HCPCS

## 2017-07-21 PROCEDURE — G0378 HOSPITAL OBSERVATION PER HR: HCPCS

## 2017-07-21 PROCEDURE — 63600175 PHARM REV CODE 636 W HCPCS: Performed by: HOSPITALIST

## 2017-07-21 PROCEDURE — 25000003 PHARM REV CODE 250: Performed by: HOSPITALIST

## 2017-07-21 PROCEDURE — 25000003 PHARM REV CODE 250: Performed by: INTERNAL MEDICINE

## 2017-07-21 PROCEDURE — 63600175 PHARM REV CODE 636 W HCPCS: Performed by: FAMILY MEDICINE

## 2017-07-21 PROCEDURE — 99220 PR INITIAL OBSERVATION CARE,LEVL III: CPT | Mod: ,,,

## 2017-07-21 PROCEDURE — 85025 COMPLETE CBC W/AUTO DIFF WBC: CPT

## 2017-07-21 PROCEDURE — 83735 ASSAY OF MAGNESIUM: CPT

## 2017-07-21 PROCEDURE — 25000003 PHARM REV CODE 250

## 2017-07-21 PROCEDURE — 84100 ASSAY OF PHOSPHORUS: CPT

## 2017-07-21 PROCEDURE — 36415 COLL VENOUS BLD VENIPUNCTURE: CPT

## 2017-07-21 PROCEDURE — 80053 COMPREHEN METABOLIC PANEL: CPT

## 2017-07-21 RX ORDER — KETOROLAC TROMETHAMINE 30 MG/ML
30 INJECTION, SOLUTION INTRAMUSCULAR; INTRAVENOUS ONCE
Status: COMPLETED | OUTPATIENT
Start: 2017-07-21 | End: 2017-07-21

## 2017-07-21 RX ORDER — ACETAMINOPHEN 325 MG/1
650 TABLET ORAL ONCE
Status: COMPLETED | OUTPATIENT
Start: 2017-07-21 | End: 2017-07-21

## 2017-07-21 RX ORDER — MAGNESIUM SULFATE HEPTAHYDRATE 40 MG/ML
2 INJECTION, SOLUTION INTRAVENOUS ONCE
Status: COMPLETED | OUTPATIENT
Start: 2017-07-21 | End: 2017-07-21

## 2017-07-21 RX ORDER — LOPERAMIDE HYDROCHLORIDE 2 MG/1
2 CAPSULE ORAL 4 TIMES DAILY PRN
Status: DISCONTINUED | OUTPATIENT
Start: 2017-07-21 | End: 2017-07-23 | Stop reason: HOSPADM

## 2017-07-21 RX ORDER — POTASSIUM CHLORIDE 20 MEQ/1
40 TABLET, EXTENDED RELEASE ORAL 2 TIMES DAILY
Status: COMPLETED | OUTPATIENT
Start: 2017-07-21 | End: 2017-07-21

## 2017-07-21 RX ORDER — IBUPROFEN 200 MG
1 TABLET ORAL DAILY
Status: DISCONTINUED | OUTPATIENT
Start: 2017-07-21 | End: 2017-07-23 | Stop reason: HOSPADM

## 2017-07-21 RX ADMIN — KETOROLAC TROMETHAMINE 30 MG: 30 INJECTION, SOLUTION INTRAMUSCULAR at 10:07

## 2017-07-21 RX ADMIN — LEVETIRACETAM 500 MG: 5 INJECTION INTRAVENOUS at 12:07

## 2017-07-21 RX ADMIN — ONDANSETRON 4 MG: 2 INJECTION INTRAMUSCULAR; INTRAVENOUS at 04:07

## 2017-07-21 RX ADMIN — TRAZODONE HYDROCHLORIDE 50 MG: 50 TABLET ORAL at 09:07

## 2017-07-21 RX ADMIN — ACETAMINOPHEN 650 MG: 325 TABLET ORAL at 04:07

## 2017-07-21 RX ADMIN — ONDANSETRON 4 MG: 2 INJECTION INTRAMUSCULAR; INTRAVENOUS at 09:07

## 2017-07-21 RX ADMIN — POTASSIUM CHLORIDE 40 MEQ: 1500 TABLET, EXTENDED RELEASE ORAL at 09:07

## 2017-07-21 RX ADMIN — MAGNESIUM SULFATE IN WATER 2 G: 40 INJECTION, SOLUTION INTRAVENOUS at 09:07

## 2017-07-21 RX ADMIN — ACETAMINOPHEN 1000 MG: 10 INJECTION, SOLUTION INTRAVENOUS at 05:07

## 2017-07-21 RX ADMIN — LOPERAMIDE HYDROCHLORIDE 2 MG: 2 CAPSULE ORAL at 02:07

## 2017-07-21 RX ADMIN — LEVETIRACETAM 500 MG: 5 INJECTION INTRAVENOUS at 09:07

## 2017-07-21 NOTE — PLAN OF CARE
ATTN: TEAM DC PLANNING     no CM needs for discharge.   NO NEEDS PER MD TEAM - DISCHARGED - HOME      07/21/17 1019   Discharge Assessment   Assessment Type Final Discharge Note       Shi Romero RN  Case management 7/21/201710:18 AM  # [phone] (FAX) [phone]

## 2017-07-21 NOTE — H&P
[facility] Hospital Medicine  History & Physical    Patient Name: Earl Abdul  MRN: [id number]  Admission Date: 7/20/2017  Attending Physician: Tara Moeller MD   Primary Care Provider: Dorota Galvin MD         Patient information was obtained from patient, past medical records and ER records.     Subjective:     Principal Problem:Abdominal pain    Chief Complaint:   Chief Complaint   Patient presents with    Abdominal Pain     C/o mid abd pain with N/V/D. Was seen at [facility] on Tuesday for same s/s. Diagnosed with irritation to lower bowel.        HPI:   Abdominal Pain        C/o mid abd pain with N/V/D. Was seen at [facility] on Tuesday for same s/s. Diagnosed with irritation to lower bowel.     58 yo female discharged from ED yesterday after being seen for abdominal pain that is an ongoing issue.  She had a witnessed tonic clonic seizure in the parking lot, lasting about two minutes.  She has not had seizures before.  She was brought back to ED in a post ictal state.  She has a history of drug and alcohol abuse but claims sobriety for three weeks since entering rehab    Past Medical History:   Diagnosis Date    Anemia     Anemia     Depression     Fatty liver     Hyperlipidemia     Hypertension     Polysubstance abuse     Sarcoidosis of lung     Suicide attempt     Suicide ideation        Past Surgical History:   Procedure Laterality Date    APPENDECTOMY      ESOPHAGOGASTRODUODENOSCOPY  10/7/2016, 11/6/2014    2016 - gastritis, duodenitis, 2014 erosive gastritis    FLEXIBLE SIGMOIDOSCOPY  11/06/2014    colitis    HYSTERECTOMY      mediastenoscopy      TONSILLECTOMY N/A 1970    TUBAL LIGATION         Review of patient's allergies indicates:   Allergen Reactions    Fentanyl Other (See Comments)     Other reaction(s): Itching    Lortab  [hydrocodone-acetaminophen] Other (See Comments)    Phenothiazines     Promethazine Other (See Comments)     Other reaction(s):  Itching    Promethazine hcl Hives       No current facility-administered medications on file prior to encounter.      Current Outpatient Prescriptions on File Prior to Encounter   Medication Sig    baclofen (LIORESAL) 10 MG tablet Take 10 mg by mouth 3 (three) times daily as needed (for spasms).     busPIRone (BUSPAR) 15 MG tablet Take 1 tablet by mouth 3 (three) times daily.     ciprofloxacin HCl (CIPRO) 500 MG tablet Take 1 tablet (500 mg total) by mouth 2 (two) times daily.    desvenlafaxine (PRISTIQ) 100 MG 24 hr tablet Take 1 tablet by mouth once daily.     dicyclomine (BENTYL) 20 mg tablet Take 1 tablet (20 mg total) by mouth 2 (two) times daily.    LIPASE/PROTEASE/AMYLASE (PANCRELIPASE EC ORAL) Take by mouth.    metronidazole (FLAGYL) 500 MG tablet Take 1 tablet (500 mg total) by mouth 3 (three) times daily.    ondansetron (ZOFRAN ODT) 4 MG TbDL Take 1 tablet (4 mg total) by mouth every 8 (eight) hours as needed (nausea).    pantoprazole (PROTONIX) 40 MG tablet Take 40 mg by mouth once daily.    trazodone (DESYREL) 100 MG tablet Take 200 mg by mouth every evening.     vitamin D 1000 units Tab Take 1,000 Units by mouth every evening.      Family History     Problem Relation (Age of Onset)    Breast cancer Maternal Aunt, Daughter    Colon cancer Maternal Uncle        Social History Main Topics    Smoking status: Current Every Day Smoker     Packs/day: 1.00     Years: 30.00     Types: Cigarettes    Smokeless tobacco: Never Used    Alcohol use No    Drug use: No    Sexual activity: Yes     Birth control/ protection: Surgical     Review of Systems   Constitutional: Negative.  Negative for chills, fever and unexpected weight change.   HENT: Negative for dental problem, ear pain and trouble swallowing.    Eyes: Positive for visual disturbance.   Respiratory: Negative for cough, shortness of breath and wheezing.    Cardiovascular: Negative for chest pain and leg swelling.   Gastrointestinal: Positive  for diarrhea. Negative for abdominal distention and vomiting.   Endocrine: Negative for polydipsia and polyphagia.   Genitourinary: Negative for dysuria and flank pain.   Musculoskeletal: Negative for arthralgias, myalgias, neck pain and neck stiffness.   Skin: Negative for rash.   Allergic/Immunologic: Negative.    Neurological: Positive for headaches. Negative for syncope and weakness.   Psychiatric/Behavioral: Negative for agitation and behavioral problems.   All other systems reviewed and are negative.    Objective:     Vital Signs (Most Recent):  Temp: 98.4 °F (36.9 °C) (07/21/17 0753)  Pulse: 68 (07/21/17 0753)  Resp: 16 (07/21/17 0753)  BP: (!) 154/78 (07/21/17 0753)  SpO2: 96 % (07/21/17 0753) Vital Signs (24h Range):  Temp:  [98.4 °F (36.9 °C)-99.8 °F (37.7 °C)] 98.4 °F (36.9 °C)  Pulse:  [65-84] 68  Resp:  [16-20] 16  SpO2:  [95 %-100 %] 96 %  BP: (126-183)/() 154/78     Weight: 54.4 kg (120 lb)  Body mass index is 19.37 kg/m².    Physical Exam   Constitutional: She is oriented to person, place, and time. She appears well-developed and well-nourished.  Non-toxic appearance. She does not have a sickly appearance.   HENT:   Head: Normocephalic and atraumatic.   Right Ear: Hearing normal.   Left Ear: Hearing normal.   Mouth/Throat: Oropharynx is clear and moist and mucous membranes are normal.   Eyes: Conjunctivae and EOM are normal. Pupils are equal, round, and reactive to light.   Right periorbital ecchymosis, no proptosis, pupil round, reactive   Neck: Normal range of motion. Neck supple. Carotid bruit is not present. No Brudzinski's sign and no Kernig's sign noted.   Cardiovascular: Normal rate, regular rhythm, S1 normal, S2 normal, normal heart sounds, intact distal pulses and normal pulses.  Exam reveals no gallop and no S3.    No murmur heard.  Pulmonary/Chest: Effort normal and breath sounds normal. No accessory muscle usage. No respiratory distress. She has no wheezes. She has no rales.    Abdominal: Soft. Normal appearance and bowel sounds are normal. She exhibits no distension and no pulsatile midline mass. There is no hepatosplenomegaly. There is tenderness.   Tender to superficial palpation, soft, normal bowel sounds   Musculoskeletal: Normal range of motion.   Neurological: She is alert and oriented to person, place, and time. She has normal strength and normal reflexes. No cranial nerve deficit or sensory deficit. She exhibits normal muscle tone. Coordination normal. GCS eye subscore is 4. GCS verbal subscore is 5. GCS motor subscore is 6.   No cranial nerve deficit   Skin: Skin is warm, dry and intact. Capillary refill takes less than 2 seconds. No rash noted.   Psychiatric: She has a normal mood and affect. Her speech is normal and behavior is normal. Judgment and thought content normal. Cognition and memory are normal.   Nursing note and vitals reviewed.       Significant Labs:   Recent Lab Results       07/21/17  0430 07/20/17  1942 07/20/17  1837      Albumin 3.5        3.5       Alkaline Phosphatase 76        76       ALT 59(H)        59(H)       Anion Gap 12  19(H)      12       AST 55(H)        55(H)       Baso # 0.01        0.01       Basophil% 0.1        0.1       Total Bilirubin 1.0  Comment:  For infants and newborns, interpretation of results should be based  on gestational age, weight and in agreement with clinical  observations.  Premature Infant recommended reference ranges:  Up to 24 hours.............<8.0 mg/dL  Up to 48 hours............<12.0 mg/dL  3-5 days..................<15.0 mg/dL  6-29 days.................<15.0 mg/dL          1.0  Comment:  For infants and newborns, interpretation of results should be based  on gestational age, weight and in agreement with clinical  observations.  Premature Infant recommended reference ranges:  Up to 24 hours.............<8.0 mg/dL  Up to 48 hours............<12.0 mg/dL  3-5 days..................<15.0 mg/dL  6-29  days.................<15.0 mg/dL         BUN, Bld 2(L)  4(L)      2(L)       Calcium 8.7  9.1      8.7       Chloride 105  104      105       CO2 22(L)  19(L)      22(L)       Creatinine 0.6  0.8      0.6       Differential Method Automated        Automated       eGFR if  >60  >60      >60       eGFR if non  >60  Comment:  Calculation used to obtain the estimated glomerular filtration  rate (eGFR) is the CKD-EPI equation. Since race is unknown   in our information system, the eGFR values for   -American and Non--American patients are given   for each creatinine result.    >60  Comment:  Calculation used to obtain the estimated glomerular filtration  rate (eGFR) is the CKD-EPI equation. Since race is unknown   in our information system, the eGFR values for   -American and Non--American patients are given   for each creatinine result.        >60  Comment:  Calculation used to obtain the estimated glomerular filtration  rate (eGFR) is the CKD-EPI equation. Since race is unknown   in our information system, the eGFR values for   -American and Non--American patients are given   for each creatinine result.         Eos # 0.0        0.0       Eosinophil% 0.3        0.3       Glucose 108  91      108       Gran # 4.9        4.9       Gran% 65.4        65.4       Hematocrit 36.8(L)        36.8(L)       Hemoglobin 12.3        12.3       Lymph # 2.0        2.0       Lymph% 26.3        26.3       Magnesium 1.5(L)  1.6     MCH 32.5(H)        32.5(H)       MCHC 33.4        33.4       MCV 97        97       Mono # 0.6        0.6       Mono% 7.6        7.6       MPV 10.8        10.8       Phosphorus 3.0  3.2     Platelets 180        180       Potassium 2.9(L)  3.0(L)      2.9(L)       Total Protein 6.5        6.5       RBC 3.79(L)        3.79(L)       RDW 13.9        13.9       Sodium 139  142      139       Stool WBC  No neutrophils seen      WBC 7.41         7.41             Significant Imaging: I have reviewed and interpreted all pertinent imaging results/findings within the past 24 hours.    Assessment/Plan:     New onset seizure    keppra load  Neurology consult  MRI brain          Substance abuse    Claims sobriety for three weeks          Diarrhea    Doubt c diff.  Symptomatic treatment          Tobacco abuse    Counseled to cease          * Abdominal pain    I suspect this is a manifestation of narcotic withdrawal.  Soft abdomen, normal studies.  She is out of her chronic narcotic.  I suggested she see her pain management physician across the street, who was notified            VTE Risk Mitigation         Ordered     enoxaparin injection 40 mg  Daily     Route:  Subcutaneous        07/20/17 2106     Medium Risk of VTE  Once      07/20/17 2105     Place sequential compression device  Until discontinued      07/20/17 2105        Cliff Smith PA-C  Department of Hospital Medicine   [facility]

## 2017-07-21 NOTE — PLAN OF CARE
CM met with pt at bedside for initial discharge planning assessment. Pt states she is independent and will likely have no CM needs for discharge. CM to follow and remain supportive.     07/21/17 0834   Discharge Assessment   Assessment Type Discharge Planning Assessment   Confirmed/corrected address and phone number on facesheet? Yes   Assessment information obtained from? Patient;Medical Record   Expected Length of Stay (days) 2   Communicated expected length of stay with patient/caregiver yes   Type of Healthcare Directive Received (pt states no document available)   If Healthcare Directive is received, is it scanned into Epic? no (comment)   Prior to hospitilization cognitive status: Alert/Oriented   Prior to hospitalization functional status: Independent   Current cognitive status: Alert/Oriented   Current Functional Status: Independent   Arrived From home or self-care   Lives With spouse   Able to Return to Prior Arrangements yes   Is patient able to care for self after discharge? Yes   How many people do you have in your home that can help with your care after discharge? 1   Who are your caregiver(s) and their phone number(s)? Henrique Abdul, spouse, [phone]   Patient's perception of discharge disposition home or selfcare   Readmission Within The Last 30 Days no previous admission in last 30 days   Patient currently being followed by outpatient case management? No   Patient currently receives home health services? No   Does the patient currently use HME? No   Patient currently receives private duty nursing? No   Patient currently receives any other outside agency services? No   Equipment Currently Used at Home none   Do you have any problems affording any of your prescribed medications? No   Is the patient taking medications as prescribed? yes   Do you have any financial concerns preventing you from receiving the healthcare you need? No   Does the patient have transportation to healthcare appointments? Yes    Transportation Available family or friend will provide   On Dialysis? No   Does the patient receive services at the [facility]mad[facility] Clinic? No   Are there any open cases? No   Discharge Plan A Home   Discharge Plan B Home   Patient/Family In Agreement With Plan yes

## 2017-07-21 NOTE — TELEPHONE ENCOUNTER
----- Message from Gautam Haq sent at 7/21/2017  3:09 PM CDT -----  Contact: Earl Abdul  _X  1st Request  _  2nd Request  _  3rd Request        Who: Earl Abdul    Why: Patient is having trouble sleeping and patient states the trazodone 200 mg she is currently taking is not working. Patient would like to know if she can get something new to help her sleep. Please call back to follow up.    What Number to Call Back: [phone]    When to Expect a call back: (With in 24 hours)

## 2017-07-21 NOTE — TELEPHONE ENCOUNTER
Please notify pt that can try benadryl 25mg over the counter to see if helps with sleep or otc melatonin - do not rec taking anything else with buspirone and trazodone. rec f/u with addiction specialist as discussed in clinic to discuss current meds - please help schedule this appt with Dr. Parra if not already scheduled

## 2017-07-21 NOTE — PLAN OF CARE
ATTN: TEAM DC PLANNING     no CM needs for discharge.   NO NEEDS PER MD TEAM - DISCHARGED  HOME   Shi Romero RN  Case management 7/21/201710:18 AM  # [phone] (FAX) [phone]

## 2017-07-21 NOTE — ED NOTES
Pt cleaned and repositioned for comfort.  remains at bedside.  Pt is oriented x2 to person and place.   Will continue to monitor.

## 2017-07-21 NOTE — CONSULTS
[facility] Hospital Medicine  Consult Note    Patient Name: Earl Abdul  MRN: [id number]  Admission Date: 7/20/2017  Hospital Length of Stay: 1 days  Attending Physician:   Primary Care Provider: Dorota Galvin MD           Patient information was obtained from patient and record    Consults  Subjective: diarrhea     Principal Problem:Abdominal pain    Chief Complaint:   Chief Complaint   Patient presents with    Abdominal Pain     C/o mid abd pain with N/V/D. Was seen at [facility] on Tuesday for same s/s. Diagnosed with irritation to lower bowel.        HPI: This lady has experienced sudden val-umbilical severe cramps with nausea and profuse diarrhea complicated by dehydration and she sustained a fall that traumatized her face. Two days prior to admission, she was seen in a [facility] ED for diarrhea and pain and antispasmodics were offered. Persistence of her symptoms resulted in visit. Here. She is on IV fluids and feels better. No blood loss or  Ischemic symptoms.    Past Medical History:   Diagnosis Date    Anemia     Anemia     Depression     Fatty liver     Hyperlipidemia     Hypertension     Polysubstance abuse     Sarcoidosis of lung     Suicide attempt     Suicide ideation        Past Surgical History:   Procedure Laterality Date    APPENDECTOMY      ESOPHAGOGASTRODUODENOSCOPY  10/7/2016, 11/6/2014    2016 - gastritis, duodenitis, 2014 erosive gastritis    FLEXIBLE SIGMOIDOSCOPY  11/06/2014    colitis    HYSTERECTOMY      mediastenoscopy      TONSILLECTOMY N/A 1970    TUBAL LIGATION         Review of patient's allergies indicates:   Allergen Reactions    Fentanyl Other (See Comments)     Other reaction(s): Itching    Lortab  [hydrocodone-acetaminophen] Other (See Comments)    Phenothiazines     Promethazine Other (See Comments)     Other reaction(s): Itching    Promethazine hcl Hives       No current facility-administered medications on file prior to  encounter.      Current Outpatient Prescriptions on File Prior to Encounter   Medication Sig    baclofen (LIORESAL) 10 MG tablet Take 10 mg by mouth 3 (three) times daily as needed (for spasms).     busPIRone (BUSPAR) 15 MG tablet Take 1 tablet by mouth 3 (three) times daily.     ciprofloxacin HCl (CIPRO) 500 MG tablet Take 1 tablet (500 mg total) by mouth 2 (two) times daily.    desvenlafaxine (PRISTIQ) 100 MG 24 hr tablet Take 1 tablet by mouth once daily.     dicyclomine (BENTYL) 20 mg tablet Take 1 tablet (20 mg total) by mouth 2 (two) times daily.    LIPASE/PROTEASE/AMYLASE (PANCRELIPASE EC ORAL) Take by mouth.    metronidazole (FLAGYL) 500 MG tablet Take 1 tablet (500 mg total) by mouth 3 (three) times daily.    ondansetron (ZOFRAN ODT) 4 MG TbDL Take 1 tablet (4 mg total) by mouth every 8 (eight) hours as needed (nausea).    pantoprazole (PROTONIX) 40 MG tablet Take 40 mg by mouth once daily.    trazodone (DESYREL) 100 MG tablet Take 200 mg by mouth every evening.     vitamin D 1000 units Tab Take 1,000 Units by mouth every evening.      Family History     Problem Relation (Age of Onset)    Breast cancer Maternal Aunt, Daughter    Colon cancer Maternal Uncle        Social History Main Topics    Smoking status: Current Every Day Smoker     Packs/day: 1.00     Years: 30.00     Types: Cigarettes    Smokeless tobacco: Never Used    Alcohol use No    Drug use: No    Sexual activity: Yes     Birth control/ protection: Surgical     Review of Systems   Constitutional: Positive for activity change, appetite change, chills and fatigue.   Eyes: Negative.    Respiratory: Negative.    Cardiovascular: Negative.    Gastrointestinal: Positive for abdominal distention, abdominal pain, diarrhea, nausea and vomiting.   Endocrine: Negative.    Genitourinary: Negative.    Musculoskeletal: Negative.    Allergic/Immunologic: Negative.    Neurological: Positive for dizziness, seizures and weakness.   Hematological:  Negative.    Psychiatric/Behavioral: Positive for dysphoric mood. The patient is nervous/anxious.      Objective:     Vital Signs (Most Recent):  Temp: 98.4 °F (36.9 °C) (07/21/17 0753)  Pulse: 68 (07/21/17 0753)  Resp: 16 (07/21/17 0753)  BP: (!) 154/78 (07/21/17 0753)  SpO2: 96 % (07/21/17 0753) Vital Signs (24h Range):  Temp:  [98.4 °F (36.9 °C)-99.8 °F (37.7 °C)] 98.4 °F (36.9 °C)  Pulse:  [65-84] 68  Resp:  [16-29] 16  SpO2:  [95 %-100 %] 96 %  BP: (126-190)/() 154/78     Weight: 54.4 kg (120 lb)  Body mass index is 19.37 kg/m².    Physical Exam   Constitutional: She is oriented to person, place, and time. She appears well-developed and well-nourished. No distress.   HENT:   Head: Normocephalic.   Eyes: Pupils are equal, round, and reactive to light.   Neck: Normal range of motion. Neck supple.   Pulmonary/Chest: Effort normal and breath sounds normal.   Abdominal: Soft. Bowel sounds are normal. She exhibits no distension and no mass. There is no tenderness. There is no rebound and no guarding. No hernia.   Musculoskeletal: She exhibits no edema, tenderness or deformity.   Neurological: She is alert and oriented to person, place, and time.   Skin: Skin is warm and dry.   Psychiatric: She has a normal mood and affect. Her behavior is normal.   Nursing note and vitals reviewed.      Significant Labs:as reported    Significant Imaging: as reported    Assessment/Plan: this lady presented with dehydration probably die to a viral gastroenteritis. She is feeling better GI wise and has no acute visceral signs. She has a fatty liver and has issues in the past with narcotic use and alcohol which has stopped. She had a recent fall and traumatized her face. She is being evaluated for a seizure. Continue IV fluids for now.     Active Diagnoses:    Diagnosis Date Noted POA    PRINCIPAL PROBLEM:  Abdominal pain [R10.9] 07/20/2017 Yes    New onset seizure [R56.9] 07/20/2017 Yes    Intractable nausea and vomiting  [R11.2] 07/20/2017 Yes    Substance abuse [F19.10] 10/15/2014 Yes    Tobacco abuse [Z72.0] 02/07/2014 Yes      Problems Resolved During this Admission:    Diagnosis Date Noted Date Resolved POA     VTE Risk Mitigation         Ordered     enoxaparin injection 40 mg  Daily     Route:  Subcutaneous        07/20/17 2106     Medium Risk of VTE  Once      07/20/17 2105     Place sequential compression device  Until discontinued      07/20/17 2105          Thank you for your consult.    Eldon Keith MD  Department of Hospital Medicine   [facility]

## 2017-07-21 NOTE — ED PROVIDER NOTES
"Encounter Date: 7/20/2017       History     Chief Complaint   Patient presents with    Abdominal Pain     C/o mid abd pain with N/V/D. Was seen at [facility] on Tuesday for same s/s. Diagnosed with irritation to lower bowel.     57-year-old female brought in for a seizure.  Past medical history includes polysubstance abuse, sarcoidosis, fibromyalgia, and chronic pain.  Patient was discharged from the hospital approximately 30 minutes prior to my evaluation.  She was previously seen for abdominal pain, vomiting, and diarrhea she had a CT that showed no acute process.  However, because her discomfort was intractable she was admitted for further management.  Patient was discharged and then developed generalized shaking and unresponsiveness before leaving the hospital.  Symptoms lasted approximately 2 minutes.  No intervention was given during this time; seizure was self-limited.    Patient has no prior history of seizure.  She had head trauma recently; CT of her head performed earlier today showed no intracranial injury.  She has a history of ethanol abuse and was in detox approximately 3 weeks ago. Her  says she gets "shakes" when she has not had alcohol.  He says that she initially was shaking when she left detox, but he has not noticed her shaking in the last 2 weeks.  He also does not believe that she is intoxicated within the last 2 weeks.  Review of her medical records within Epic from today's prior ED visit shows that her urinalysis was positive for benzodiazepines.  Her  states that she used to take Xanax but she has been denied having had any of this recently.  I explained to them his urine results her  and retracted his statement noting that she may have had a Xanax last night.  There is no recent fever or illness.  Her labs from earlier today were otherwise unremarkable except a mild hypokalemia.          Review of patient's allergies indicates:   Allergen Reactions    Fentanyl Other (See " Comments)     Other reaction(s): Itching    Lortab  [hydrocodone-acetaminophen] Other (See Comments)    Phenothiazines     Promethazine Other (See Comments)     Other reaction(s): Itching    Promethazine hcl Hives     Past Medical History:   Diagnosis Date    Anemia     Anemia     Depression     Fatty liver     Hyperlipidemia     Hypertension     Polysubstance abuse     Sarcoidosis of lung     Suicide attempt     Suicide ideation      Past Surgical History:   Procedure Laterality Date    APPENDECTOMY      ESOPHAGOGASTRODUODENOSCOPY  10/7/2016, 11/6/2014    2016 - gastritis, duodenitis, 2014 erosive gastritis    FLEXIBLE SIGMOIDOSCOPY  11/06/2014    colitis    HYSTERECTOMY      mediastenoscopy      TONSILLECTOMY N/A 1970    TUBAL LIGATION       Family History   Problem Relation Age of Onset    Breast cancer Maternal Aunt     Colon cancer Maternal Uncle     Breast cancer Daughter     Ovarian cancer Neg Hx      Social History   Substance Use Topics    Smoking status: Current Every Day Smoker     Packs/day: 1.00     Years: 30.00     Types: Cigarettes    Smokeless tobacco: Never Used    Alcohol use No     Review of Systems   Constitutional: Negative for activity change and fever.   HENT: Negative for congestion and sore throat.    Respiratory: Negative for cough.    Cardiovascular: Negative for chest pain.   Gastrointestinal: Negative for abdominal pain and diarrhea.   Genitourinary: Negative for decreased urine volume.   Musculoskeletal: Negative for arthralgias.   Skin: Negative for rash.   Allergic/Immunologic: Negative for immunocompromised state.   Neurological: Positive for seizures. Negative for weakness and light-headedness.   Psychiatric/Behavioral: Negative for confusion.       Physical Exam     Initial Vitals [07/20/17 0917]   BP Pulse Resp Temp SpO2   (!) 158/103 70 18 98.8 °F (37.1 °C) 99 %      MAP       121.33         Physical Exam    Nursing note and vitals  reviewed.  Constitutional: She appears well-developed and well-nourished. She is not diaphoretic. No distress.   Post-ictal   HENT:   Head: Normocephalic.   Right Ear: External ear normal.   Left Ear: External ear normal.   Right periorbital ecchymosis   Eyes: Conjunctivae and EOM are normal. Pupils are equal, round, and reactive to light. Right eye exhibits no discharge. Left eye exhibits no discharge. No scleral icterus.   Neck: Normal range of motion. Neck supple.   Cardiovascular: Normal rate, regular rhythm, normal heart sounds and intact distal pulses. Exam reveals no gallop and no friction rub.    No murmur heard.  Pulmonary/Chest: Breath sounds normal. No respiratory distress. She has no wheezes. She has no rhonchi. She has no rales.   Abdominal: Soft. Bowel sounds are normal. She exhibits no distension. There is no tenderness.   Musculoskeletal: Normal range of motion. She exhibits no edema or tenderness.   Neurological: She is alert. She has normal strength. No cranial nerve deficit or sensory deficit.   Skin: Skin is warm. Capillary refill takes less than 2 seconds. No pallor.   Psychiatric: Her behavior is normal. Thought content normal.         ED Course   Procedures  Labs Reviewed   CBC W/ AUTO DIFFERENTIAL - Abnormal; Notable for the following:        Result Value    MCH 33.0 (*)     All other components within normal limits   COMPREHENSIVE METABOLIC PANEL - Abnormal; Notable for the following:     Potassium 3.1 (*)     BUN, Bld 5 (*)     Total Bilirubin 1.1 (*)     AST 59 (*)     ALT 71 (*)     All other components within normal limits   BASIC METABOLIC PANEL - Abnormal; Notable for the following:     Potassium 3.0 (*)     CO2 19 (*)     BUN, Bld 4 (*)     Anion Gap 19 (*)     All other components within normal limits   CULTURE, STOOL   ENTEROHEMORRHAGIC E.COLI   LIPASE   URINALYSIS   TOXICOLOGY SCREEN, URINE, RANDOM (COMPLIANCE)   ALCOHOL,MEDICAL (ETHANOL)   MAGNESIUM   PHOSPHORUS   DRUG SCREEN  PANEL, URINE EMERGENCY   WBC, STOOL   POCT GLUCOSE     EKG Readings: (Independently Interpreted)   Initial Reading: No STEMI.   NSR @ 91.          Medical Decision Making:   Initial Assessment:   Patient presents with a new onset seizure.  Labs and CT performed less than 12 hours ago showed no etiology.  I will repeat her chemistries at this time.  I suspect her symptoms may be due to benzodiazepine withdrawal versus polysubstance use  I believe that admission for neuro checks is warranted.    19:00 - repeat chemistry is normal.  Patient has had two bouts of diarrhea while under my care.  I will send stool cultures at this time.  Though she remains postictal her mental status is gradually improving.  I will admit to the hospital at this time.  Case discussed with Cliff Smith of the hospitalist service who has accepted this admission.                   ED Course     Clinical Impression:     1. Abdominal pain    2. Intractable abdominal pain    3. Intractable vomiting with nausea, unspecified vomiting type    4. Hypokalemia    5. Intractable nausea and vomiting    6. New onset seizure                             Angelina Fox MD  07/20/17 2032

## 2017-07-21 NOTE — SUBJECTIVE & OBJECTIVE
Past Medical History:   Diagnosis Date    Anemia     Anemia     Depression     Fatty liver     Hyperlipidemia     Hypertension     Polysubstance abuse     Sarcoidosis of lung     Suicide attempt     Suicide ideation        Past Surgical History:   Procedure Laterality Date    APPENDECTOMY      ESOPHAGOGASTRODUODENOSCOPY  10/7/2016, 11/6/2014    2016 - gastritis, duodenitis, 2014 erosive gastritis    FLEXIBLE SIGMOIDOSCOPY  11/06/2014    colitis    HYSTERECTOMY      mediastenoscopy      TONSILLECTOMY N/A 1970    TUBAL LIGATION         Review of patient's allergies indicates:   Allergen Reactions    Fentanyl Other (See Comments)     Other reaction(s): Itching    Lortab  [hydrocodone-acetaminophen] Other (See Comments)    Phenothiazines     Promethazine Other (See Comments)     Other reaction(s): Itching    Promethazine hcl Hives       No current facility-administered medications on file prior to encounter.      Current Outpatient Prescriptions on File Prior to Encounter   Medication Sig    baclofen (LIORESAL) 10 MG tablet Take 10 mg by mouth 3 (three) times daily as needed (for spasms).     busPIRone (BUSPAR) 15 MG tablet Take 1 tablet by mouth 3 (three) times daily.     ciprofloxacin HCl (CIPRO) 500 MG tablet Take 1 tablet (500 mg total) by mouth 2 (two) times daily.    desvenlafaxine (PRISTIQ) 100 MG 24 hr tablet Take 1 tablet by mouth once daily.     dicyclomine (BENTYL) 20 mg tablet Take 1 tablet (20 mg total) by mouth 2 (two) times daily.    LIPASE/PROTEASE/AMYLASE (PANCRELIPASE EC ORAL) Take by mouth.    metronidazole (FLAGYL) 500 MG tablet Take 1 tablet (500 mg total) by mouth 3 (three) times daily.    ondansetron (ZOFRAN ODT) 4 MG TbDL Take 1 tablet (4 mg total) by mouth every 8 (eight) hours as needed (nausea).    pantoprazole (PROTONIX) 40 MG tablet Take 40 mg by mouth once daily.    trazodone (DESYREL) 100 MG tablet Take 200 mg by mouth every evening.     vitamin D 1000  units Tab Take 1,000 Units by mouth every evening.      Family History     Problem Relation (Age of Onset)    Breast cancer Maternal Aunt, Daughter    Colon cancer Maternal Uncle        Social History Main Topics    Smoking status: Current Every Day Smoker     Packs/day: 1.00     Years: 30.00     Types: Cigarettes    Smokeless tobacco: Never Used    Alcohol use No    Drug use: No    Sexual activity: Yes     Birth control/ protection: Surgical     Review of Systems   Constitutional: Negative.  Negative for chills, fever and unexpected weight change.   HENT: Negative for dental problem, ear pain and trouble swallowing.    Eyes: Positive for visual disturbance.   Respiratory: Negative for cough, shortness of breath and wheezing.    Cardiovascular: Negative for chest pain and leg swelling.   Gastrointestinal: Positive for diarrhea. Negative for abdominal distention and vomiting.   Endocrine: Negative for polydipsia and polyphagia.   Genitourinary: Negative for dysuria and flank pain.   Musculoskeletal: Negative for arthralgias, myalgias, neck pain and neck stiffness.   Skin: Negative for rash.   Allergic/Immunologic: Negative.    Neurological: Positive for headaches. Negative for syncope and weakness.   Psychiatric/Behavioral: Negative for agitation and behavioral problems.   All other systems reviewed and are negative.    Objective:     Vital Signs (Most Recent):  Temp: 98.4 °F (36.9 °C) (07/21/17 0753)  Pulse: 68 (07/21/17 0753)  Resp: 16 (07/21/17 0753)  BP: (!) 154/78 (07/21/17 0753)  SpO2: 96 % (07/21/17 0753) Vital Signs (24h Range):  Temp:  [98.4 °F (36.9 °C)-99.8 °F (37.7 °C)] 98.4 °F (36.9 °C)  Pulse:  [65-84] 68  Resp:  [16-20] 16  SpO2:  [95 %-100 %] 96 %  BP: (126-183)/() 154/78     Weight: 54.4 kg (120 lb)  Body mass index is 19.37 kg/m².    Physical Exam   Constitutional: She is oriented to person, place, and time. She appears well-developed and well-nourished.  Non-toxic appearance. She does  not have a sickly appearance.   HENT:   Head: Normocephalic and atraumatic.   Right Ear: Hearing normal.   Left Ear: Hearing normal.   Mouth/Throat: Oropharynx is clear and moist and mucous membranes are normal.   Eyes: Conjunctivae and EOM are normal. Pupils are equal, round, and reactive to light.   Right periorbital ecchymosis, no proptosis, pupil round, reactive   Neck: Normal range of motion. Neck supple. Carotid bruit is not present. No Brudzinski's sign and no Kernig's sign noted.   Cardiovascular: Normal rate, regular rhythm, S1 normal, S2 normal, normal heart sounds, intact distal pulses and normal pulses.  Exam reveals no gallop and no S3.    No murmur heard.  Pulmonary/Chest: Effort normal and breath sounds normal. No accessory muscle usage. No respiratory distress. She has no wheezes. She has no rales.   Abdominal: Soft. Normal appearance and bowel sounds are normal. She exhibits no distension and no pulsatile midline mass. There is no hepatosplenomegaly. There is tenderness.   Tender to superficial palpation, soft, normal bowel sounds   Musculoskeletal: Normal range of motion.   Neurological: She is alert and oriented to person, place, and time. She has normal strength and normal reflexes. No cranial nerve deficit or sensory deficit. She exhibits normal muscle tone. Coordination normal. GCS eye subscore is 4. GCS verbal subscore is 5. GCS motor subscore is 6.   No cranial nerve deficit   Skin: Skin is warm, dry and intact. Capillary refill takes less than 2 seconds. No rash noted.   Psychiatric: She has a normal mood and affect. Her speech is normal and behavior is normal. Judgment and thought content normal. Cognition and memory are normal.   Nursing note and vitals reviewed.       Significant Labs:   Recent Lab Results       07/21/17  0430 07/20/17  1942 07/20/17  1837      Albumin 3.5        3.5       Alkaline Phosphatase 76        76       ALT 59(H)        59(H)       Anion Gap 12  19(H)      12        AST 55(H)        55(H)       Baso # 0.01        0.01       Basophil% 0.1        0.1       Total Bilirubin 1.0  Comment:  For infants and newborns, interpretation of results should be based  on gestational age, weight and in agreement with clinical  observations.  Premature Infant recommended reference ranges:  Up to 24 hours.............<8.0 mg/dL  Up to 48 hours............<12.0 mg/dL  3-5 days..................<15.0 mg/dL  6-29 days.................<15.0 mg/dL          1.0  Comment:  For infants and newborns, interpretation of results should be based  on gestational age, weight and in agreement with clinical  observations.  Premature Infant recommended reference ranges:  Up to 24 hours.............<8.0 mg/dL  Up to 48 hours............<12.0 mg/dL  3-5 days..................<15.0 mg/dL  6-29 days.................<15.0 mg/dL         BUN, Bld 2(L)  4(L)      2(L)       Calcium 8.7  9.1      8.7       Chloride 105  104      105       CO2 22(L)  19(L)      22(L)       Creatinine 0.6  0.8      0.6       Differential Method Automated        Automated       eGFR if  >60  >60      >60       eGFR if non  >60  Comment:  Calculation used to obtain the estimated glomerular filtration  rate (eGFR) is the CKD-EPI equation. Since race is unknown   in our information system, the eGFR values for   -American and Non--American patients are given   for each creatinine result.    >60  Comment:  Calculation used to obtain the estimated glomerular filtration  rate (eGFR) is the CKD-EPI equation. Since race is unknown   in our information system, the eGFR values for   -American and Non--American patients are given   for each creatinine result.        >60  Comment:  Calculation used to obtain the estimated glomerular filtration  rate (eGFR) is the CKD-EPI equation. Since race is unknown   in our information system, the eGFR values for   -American and Non--American  patients are given   for each creatinine result.         Eos # 0.0        0.0       Eosinophil% 0.3        0.3       Glucose 108  91      108       Gran # 4.9        4.9       Gran% 65.4        65.4       Hematocrit 36.8(L)        36.8(L)       Hemoglobin 12.3        12.3       Lymph # 2.0        2.0       Lymph% 26.3        26.3       Magnesium 1.5(L)  1.6     MCH 32.5(H)        32.5(H)       MCHC 33.4        33.4       MCV 97        97       Mono # 0.6        0.6       Mono% 7.6        7.6       MPV 10.8        10.8       Phosphorus 3.0  3.2     Platelets 180        180       Potassium 2.9(L)  3.0(L)      2.9(L)       Total Protein 6.5        6.5       RBC 3.79(L)        3.79(L)       RDW 13.9        13.9       Sodium 139  142      139       Stool WBC  No neutrophils seen      WBC 7.41        7.41             Significant Imaging: I have reviewed and interpreted all pertinent imaging results/findings within the past 24 hours.

## 2017-07-21 NOTE — PLAN OF CARE
Problem: Patient Care Overview  Goal: Plan of Care Review  Outcome: Ongoing (interventions implemented as appropriate)  No significant events overnight. Remains free from fall, injury, and skin breakdown. Patient ambulates to RR; incontinence care performed. Large liquid BMs noted. VSS on RA throughout the night.  Pain mildly controlled with POmeds. Neuro checks q4h. Patient agitated and restless thoughout the night. Plan of care reviewed with patient and all questions answered. Bed low, locked w/ bed alarm on. Call light within reach. Purposeful rounding performed. Resting comfortably in bed, no other complaints at this time.

## 2017-07-22 PROBLEM — I67.83 PRES (POSTERIOR REVERSIBLE ENCEPHALOPATHY SYNDROME): Status: ACTIVE | Noted: 2017-07-22

## 2017-07-22 LAB
ALBUMIN SERPL BCP-MCNC: 3.7 G/DL
ALP SERPL-CCNC: 82 U/L
ALT SERPL W/O P-5'-P-CCNC: 71 U/L
ANION GAP SERPL CALC-SCNC: 11 MMOL/L
AST SERPL-CCNC: 82 U/L
BASOPHILS # BLD AUTO: 0.02 K/UL
BASOPHILS NFR BLD: 0.3 %
BILIRUB SERPL-MCNC: 1.1 MG/DL
BUN SERPL-MCNC: 3 MG/DL
CALCIUM SERPL-MCNC: 9.1 MG/DL
CHLORIDE SERPL-SCNC: 104 MMOL/L
CO2 SERPL-SCNC: 25 MMOL/L
CREAT SERPL-MCNC: 0.7 MG/DL
DIFFERENTIAL METHOD: ABNORMAL
EOSINOPHIL # BLD AUTO: 0.1 K/UL
EOSINOPHIL NFR BLD: 0.9 %
ERYTHROCYTE [DISTWIDTH] IN BLOOD BY AUTOMATED COUNT: 14 %
EST. GFR  (AFRICAN AMERICAN): >60 ML/MIN/1.73 M^2
EST. GFR  (NON AFRICAN AMERICAN): >60 ML/MIN/1.73 M^2
GLUCOSE SERPL-MCNC: 100 MG/DL
HCT VFR BLD AUTO: 37.4 %
HGB BLD-MCNC: 12.7 G/DL
LYMPHOCYTES # BLD AUTO: 2.1 K/UL
LYMPHOCYTES NFR BLD: 28.9 %
MAGNESIUM SERPL-MCNC: 2 MG/DL
MAGNESIUM SERPL-MCNC: 2 MG/DL
MCH RBC QN AUTO: 33.2 PG
MCHC RBC AUTO-ENTMCNC: 34 G/DL
MCV RBC AUTO: 98 FL
MONOCYTES # BLD AUTO: 0.8 K/UL
MONOCYTES NFR BLD: 10.7 %
NEUTROPHILS # BLD AUTO: 4.4 K/UL
NEUTROPHILS NFR BLD: 59.1 %
PHOSPHATE SERPL-MCNC: 2.9 MG/DL
PHOSPHATE SERPL-MCNC: 2.9 MG/DL
PLATELET # BLD AUTO: 190 K/UL
PMV BLD AUTO: 10.4 FL
POTASSIUM SERPL-SCNC: 3.9 MMOL/L
PROT SERPL-MCNC: 6.7 G/DL
RBC # BLD AUTO: 3.82 M/UL
SODIUM SERPL-SCNC: 140 MMOL/L
WBC # BLD AUTO: 7.37 K/UL

## 2017-07-22 PROCEDURE — 63600175 PHARM REV CODE 636 W HCPCS

## 2017-07-22 PROCEDURE — G0378 HOSPITAL OBSERVATION PER HR: HCPCS

## 2017-07-22 PROCEDURE — 80053 COMPREHEN METABOLIC PANEL: CPT

## 2017-07-22 PROCEDURE — 36415 COLL VENOUS BLD VENIPUNCTURE: CPT

## 2017-07-22 PROCEDURE — 25000003 PHARM REV CODE 250

## 2017-07-22 PROCEDURE — 85025 COMPLETE CBC W/AUTO DIFF WBC: CPT

## 2017-07-22 PROCEDURE — 63600175 PHARM REV CODE 636 W HCPCS: Performed by: FAMILY MEDICINE

## 2017-07-22 PROCEDURE — 25000003 PHARM REV CODE 250: Performed by: HOSPITALIST

## 2017-07-22 PROCEDURE — 83735 ASSAY OF MAGNESIUM: CPT

## 2017-07-22 PROCEDURE — 25000003 PHARM REV CODE 250: Performed by: FAMILY MEDICINE

## 2017-07-22 PROCEDURE — 84100 ASSAY OF PHOSPHORUS: CPT

## 2017-07-22 PROCEDURE — 99226 PR SUBSEQUENT OBSERVATION CARE,LEVEL III: CPT | Mod: ,,,

## 2017-07-22 PROCEDURE — 63600175 PHARM REV CODE 636 W HCPCS: Performed by: HOSPITALIST

## 2017-07-22 RX ORDER — LORAZEPAM 1 MG/1
2 TABLET ORAL NIGHTLY
Status: DISCONTINUED | OUTPATIENT
Start: 2017-07-22 | End: 2017-07-23 | Stop reason: HOSPADM

## 2017-07-22 RX ORDER — LISINOPRIL 10 MG/1
10 TABLET ORAL DAILY
Status: DISCONTINUED | OUTPATIENT
Start: 2017-07-22 | End: 2017-07-23

## 2017-07-22 RX ORDER — HYDROCHLOROTHIAZIDE 25 MG/1
25 TABLET ORAL DAILY
Status: DISCONTINUED | OUTPATIENT
Start: 2017-07-22 | End: 2017-07-23 | Stop reason: HOSPADM

## 2017-07-22 RX ORDER — NIFEDIPINE 30 MG/1
30 TABLET, EXTENDED RELEASE ORAL DAILY
Status: DISCONTINUED | OUTPATIENT
Start: 2017-07-22 | End: 2017-07-23 | Stop reason: HOSPADM

## 2017-07-22 RX ORDER — HYDROXYZINE HYDROCHLORIDE 25 MG/1
25 TABLET, FILM COATED ORAL ONCE
Status: COMPLETED | OUTPATIENT
Start: 2017-07-22 | End: 2017-07-22

## 2017-07-22 RX ORDER — METOPROLOL SUCCINATE 50 MG/1
50 TABLET, EXTENDED RELEASE ORAL DAILY
Status: DISCONTINUED | OUTPATIENT
Start: 2017-07-22 | End: 2017-07-23 | Stop reason: HOSPADM

## 2017-07-22 RX ORDER — LORAZEPAM 1 MG/1
2 TABLET ORAL ONCE
Status: COMPLETED | OUTPATIENT
Start: 2017-07-22 | End: 2017-07-22

## 2017-07-22 RX ORDER — HYDRALAZINE HYDROCHLORIDE 20 MG/ML
10 INJECTION INTRAMUSCULAR; INTRAVENOUS EVERY 8 HOURS PRN
Status: DISCONTINUED | OUTPATIENT
Start: 2017-07-22 | End: 2017-07-23 | Stop reason: HOSPADM

## 2017-07-22 RX ADMIN — LORAZEPAM 2 MG: 1 TABLET ORAL at 09:07

## 2017-07-22 RX ADMIN — NIFEDIPINE 30 MG: 30 TABLET, FILM COATED, EXTENDED RELEASE ORAL at 09:07

## 2017-07-22 RX ADMIN — HYDROCHLOROTHIAZIDE 25 MG: 25 TABLET ORAL at 09:07

## 2017-07-22 RX ADMIN — ONDANSETRON 4 MG: 2 INJECTION INTRAMUSCULAR; INTRAVENOUS at 11:07

## 2017-07-22 RX ADMIN — ONDANSETRON 4 MG: 2 INJECTION INTRAMUSCULAR; INTRAVENOUS at 12:07

## 2017-07-22 RX ADMIN — METOPROLOL SUCCINATE 50 MG: 50 TABLET, EXTENDED RELEASE ORAL at 05:07

## 2017-07-22 RX ADMIN — ENOXAPARIN SODIUM 40 MG: 100 INJECTION SUBCUTANEOUS at 04:07

## 2017-07-22 RX ADMIN — LEVETIRACETAM 500 MG: 5 INJECTION INTRAVENOUS at 08:07

## 2017-07-22 RX ADMIN — ACETAMINOPHEN 650 MG: 325 TABLET ORAL at 03:07

## 2017-07-22 RX ADMIN — ACETAMINOPHEN 650 MG: 325 TABLET ORAL at 11:07

## 2017-07-22 RX ADMIN — ONDANSETRON 4 MG: 2 INJECTION INTRAMUSCULAR; INTRAVENOUS at 08:07

## 2017-07-22 RX ADMIN — HYDROXYZINE HYDROCHLORIDE 25 MG: 25 TABLET, FILM COATED ORAL at 05:07

## 2017-07-22 RX ADMIN — LISINOPRIL 10 MG: 10 TABLET ORAL at 01:07

## 2017-07-22 RX ADMIN — HYDRALAZINE HYDROCHLORIDE 10 MG: 20 INJECTION INTRAMUSCULAR; INTRAVENOUS at 05:07

## 2017-07-22 RX ADMIN — LEVETIRACETAM 500 MG: 5 INJECTION INTRAVENOUS at 10:07

## 2017-07-22 RX ADMIN — TRAZODONE HYDROCHLORIDE 50 MG: 50 TABLET ORAL at 09:07

## 2017-07-22 RX ADMIN — NICOTINE 1 PATCH: 14 PATCH, EXTENDED RELEASE TRANSDERMAL at 08:07

## 2017-07-22 RX ADMIN — ACETAMINOPHEN 650 MG: 325 TABLET ORAL at 10:07

## 2017-07-22 RX ADMIN — LORAZEPAM 2 MG: 1 TABLET ORAL at 01:07

## 2017-07-22 NOTE — CONSULTS
"NEUROLOGY FLOOR CONSULT    Reason for consult:  Seizures    Informant:Patient       Other sources of information : spouse/SO    CC:  "Headcahe and visual disturbance and seizure"    HPI:   Earl Abdul is a 57 y.o. year old right handed female with PMHx of HTN. Lumbar back pain and alcohol abuse recently in ip rehab presented to [place] 2d prior to this admission with abdominal pain and was diagnosed as having Inflammatory Bowel disease and started on Cipro and Flagyl and  A day after that she presented here with abdominal pain again and in the ER found to have GTCS and no other further episodes, c/o headache , diffuse and through out with visual blurriness .       ROS: Headaches, Seizures    Histories:     Allergies:  Fentanyl; Lortab  [hydrocodone-acetaminophen]; Phenothiazines; Promethazine; and Promethazine hcl    Current Medications:    Current Facility-Administered Medications   Medication Dose Route Frequency Provider Last Rate Last Dose    acetaminophen tablet 650 mg  650 mg Oral Q8H PRN Cliff Smith PA-C   650 mg at 07/22/17 0349    enoxaparin injection 40 mg  40 mg Subcutaneous Daily Gallo Diaz MD   40 mg at 07/20/17 2124    hydrALAZINE injection 10 mg  10 mg Intravenous Q8H PRN Phong Galloway MD   10 mg at 07/22/17 0534    levetiracetam in NaCl (iso-os) IVPB 500 mg  500 mg Intravenous Q12H Cliff Smith PA-C 200 mL/hr at 07/22/17 0810 500 mg at 07/22/17 0810    loperamide capsule 2 mg  2 mg Oral QID PRN Cliff Smith PA-C   2 mg at 07/21/17 1414    lorazepam injection 2 mg  2 mg Intravenous Q2H PRN Cliff Smith PA-C        nicotine 14 mg/24 hr 1 patch  1 patch Transdermal Daily Cliff Smith PA-C   1 patch at 07/22/17 0815    ondansetron injection 4 mg  4 mg Intravenous Q6H PRN Gallo Diaz MD   4 mg at 07/22/17 0808    ondansetron injection 4 mg  4 mg Intravenous Q6H PRN Cliff Smith PA-C   4 mg at 07/22/17 0809    trazodone tablet 50 mg  " 50 mg Oral QHS Tara Moeller MD   50 mg at 07/21/17 2117       Past Medical/Surgical/Family History:  Medical:   Past Medical History:   Diagnosis Date    Anemia     Anemia     Depression     Fatty liver     Hyperlipidemia     Hypertension     Polysubstance abuse     Sarcoidosis of lung     Suicide attempt     Suicide ideation       Surgeries:   Past Surgical History:   Procedure Laterality Date    APPENDECTOMY      ESOPHAGOGASTRODUODENOSCOPY  10/7/2016, 11/6/2014    2016 - gastritis, duodenitis, 2014 erosive gastritis    FLEXIBLE SIGMOIDOSCOPY  11/06/2014    colitis    HYSTERECTOMY      mediastenoscopy      TONSILLECTOMY N/A 1970    TUBAL LIGATION        Family:   Family History   Problem Relation Age of Onset    Breast cancer Maternal Aunt     Colon cancer Maternal Uncle     Breast cancer Daughter     Ovarian cancer Neg Hx    , no family history of nerve or muscle disease    Social History:    Substance Abuse/Dependence History:  Tobacco: Smoked 1 packs per day for 20 years  EtOH: 4 beers per year(s)  Ilicits: unknown        Current Evaluation:     Vital Signs:   Vitals:    07/22/17 0727   BP: (!) 185/88   Pulse: 87   Resp: 18   Temp: 98.7 °F (37.1 °C)          ORIENTATION: Within Normal Limits    MEMORY: recent and remote memory intact    LANGUAGE: 0=No aphasia, normal    CRANIAL NERVES:  normal    MOTOR:  Pronator drift: absent    Right deltoid  5/5  Left deltoid  5/5  Right biceps  5/5  Left biceps  5/5  Right hip flexor  5/5  Left hip flexor  5/5  Shoulder - abduction and adduction 5/5 bilaterally  Elbow - flexion and extension 5/5 bilaterally  Wrist - flexion and extension 5/5 bilaterally    no evidence of contractility    Tone: normal    DTR'S:  2+ bilaterally    SENSORY:  normal to light touch and a pin prick bilaterally nad symmetric    CEREBELLAR/GAIT:  Finger to nose:normal  Heel to shin:normal  Gait: normal,    LABORATORY STUDIES:  Recent Results (from the past 24 hour(s))    Comprehensive Metabolic Panel (CMP)    Collection Time: 07/22/17  4:54 AM   Result Value Ref Range    Sodium 140 136 - 145 mmol/L    Potassium 3.9 3.5 - 5.1 mmol/L    Chloride 104 95 - 110 mmol/L    CO2 25 23 - 29 mmol/L    Glucose 100 70 - 110 mg/dL    BUN, Bld 3 (L) 6 - 20 mg/dL    Creatinine 0.7 0.5 - 1.4 mg/dL    Calcium 9.1 8.7 - 10.5 mg/dL    Total Protein 6.7 6.0 - 8.4 g/dL    Albumin 3.7 3.5 - 5.2 g/dL    Total Bilirubin 1.1 (H) 0.1 - 1.0 mg/dL    Alkaline Phosphatase 82 55 - 135 U/L    AST 82 (H) 10 - 40 U/L    ALT 71 (H) 10 - 44 U/L    Anion Gap 11 8 - 16 mmol/L    eGFR if African American >60 >60 mL/min/1.73 m^2    eGFR if non African American >60 >60 mL/min/1.73 m^2   Magnesium    Collection Time: 07/22/17  4:54 AM   Result Value Ref Range    Magnesium 2.0 1.6 - 2.6 mg/dL   Phosphorus    Collection Time: 07/22/17  4:54 AM   Result Value Ref Range    Phosphorus 2.9 2.7 - 4.5 mg/dL   CBC with Automated Differential    Collection Time: 07/22/17  4:54 AM   Result Value Ref Range    WBC 7.37 3.90 - 12.70 K/uL    RBC 3.82 (L) 4.00 - 5.40 M/uL    Hemoglobin 12.7 12.0 - 16.0 g/dL    Hematocrit 37.4 37.0 - 48.5 %    MCV 98 82 - 98 fL    MCH 33.2 (H) 27.0 - 31.0 pg    MCHC 34.0 32.0 - 36.0 g/dL    RDW 14.0 11.5 - 14.5 %    Platelets 190 150 - 350 K/uL    MPV 10.4 9.2 - 12.9 fL    Gran # 4.4 1.8 - 7.7 K/uL    Lymph # 2.1 1.0 - 4.8 K/uL    Mono # 0.8 0.3 - 1.0 K/uL    Eos # 0.1 0.0 - 0.5 K/uL    Baso # 0.02 0.00 - 0.20 K/uL    Gran% 59.1 38.0 - 73.0 %    Lymph% 28.9 18.0 - 48.0 %    Mono% 10.7 4.0 - 15.0 %    Eosinophil% 0.9 0.0 - 8.0 %    Basophil% 0.3 0.0 - 1.9 %    Differential Method Automated    CBC auto differential    Collection Time: 07/22/17  4:54 AM   Result Value Ref Range    WBC 7.37 3.90 - 12.70 K/uL    RBC 3.82 (L) 4.00 - 5.40 M/uL    Hemoglobin 12.7 12.0 - 16.0 g/dL    Hematocrit 37.4 37.0 - 48.5 %    MCV 98 82 - 98 fL    MCH 33.2 (H) 27.0 - 31.0 pg    MCHC 34.0 32.0 - 36.0 g/dL    RDW 14.0 11.5 -  14.5 %    Platelets 190 150 - 350 K/uL    MPV 10.4 9.2 - 12.9 fL    Gran # 4.4 1.8 - 7.7 K/uL    Lymph # 2.1 1.0 - 4.8 K/uL    Mono # 0.8 0.3 - 1.0 K/uL    Eos # 0.1 0.0 - 0.5 K/uL    Baso # 0.02 0.00 - 0.20 K/uL    Gran% 59.1 38.0 - 73.0 %    Lymph% 28.9 18.0 - 48.0 %    Mono% 10.7 4.0 - 15.0 %    Eosinophil% 0.9 0.0 - 8.0 %    Basophil% 0.3 0.0 - 1.9 %    Differential Method Automated    Comprehensive metabolic panel    Collection Time: 07/22/17  4:54 AM   Result Value Ref Range    Sodium 140 136 - 145 mmol/L    Potassium 3.9 3.5 - 5.1 mmol/L    Chloride 104 95 - 110 mmol/L    CO2 25 23 - 29 mmol/L    Glucose 100 70 - 110 mg/dL    BUN, Bld 3 (L) 6 - 20 mg/dL    Creatinine 0.7 0.5 - 1.4 mg/dL    Calcium 9.1 8.7 - 10.5 mg/dL    Total Protein 6.7 6.0 - 8.4 g/dL    Albumin 3.7 3.5 - 5.2 g/dL    Total Bilirubin 1.1 (H) 0.1 - 1.0 mg/dL    Alkaline Phosphatase 82 55 - 135 U/L    AST 82 (H) 10 - 40 U/L    ALT 71 (H) 10 - 44 U/L    Anion Gap 11 8 - 16 mmol/L    eGFR if African American >60 >60 mL/min/1.73 m^2    eGFR if non African American >60 >60 mL/min/1.73 m^2   Phosphorus    Collection Time: 07/22/17  4:54 AM   Result Value Ref Range    Phosphorus 2.9 2.7 - 4.5 mg/dL   Magnesium    Collection Time: 07/22/17  4:54 AM   Result Value Ref Range    Magnesium 2.0 1.6 - 2.6 mg/dL   CBC auto differential    Collection Time: 07/22/17  4:54 AM   Result Value Ref Range    WBC 7.37 3.90 - 12.70 K/uL    RBC 3.82 (L) 4.00 - 5.40 M/uL    Hemoglobin 12.7 12.0 - 16.0 g/dL    Hematocrit 37.4 37.0 - 48.5 %    MCV 98 82 - 98 fL    MCH 33.2 (H) 27.0 - 31.0 pg    MCHC 34.0 32.0 - 36.0 g/dL    RDW 14.0 11.5 - 14.5 %    Platelets 190 150 - 350 K/uL    MPV 10.4 9.2 - 12.9 fL    Gran # 4.4 1.8 - 7.7 K/uL    Lymph # 2.1 1.0 - 4.8 K/uL    Mono # 0.8 0.3 - 1.0 K/uL    Eos # 0.1 0.0 - 0.5 K/uL    Baso # 0.02 0.00 - 0.20 K/uL    Gran% 59.1 38.0 - 73.0 %    Lymph% 28.9 18.0 - 48.0 %    Mono% 10.7 4.0 - 15.0 %    Eosinophil% 0.9 0.0 - 8.0 %     Basophil% 0.3 0.0 - 1.9 %    Differential Method Automated    Comprehensive metabolic panel    Collection Time: 07/22/17  4:54 AM   Result Value Ref Range    Sodium 140 136 - 145 mmol/L    Potassium 3.9 3.5 - 5.1 mmol/L    Chloride 104 95 - 110 mmol/L    CO2 25 23 - 29 mmol/L    Glucose 100 70 - 110 mg/dL    BUN, Bld 3 (L) 6 - 20 mg/dL    Creatinine 0.7 0.5 - 1.4 mg/dL    Calcium 9.1 8.7 - 10.5 mg/dL    Total Protein 6.7 6.0 - 8.4 g/dL    Albumin 3.7 3.5 - 5.2 g/dL    Total Bilirubin 1.1 (H) 0.1 - 1.0 mg/dL    Alkaline Phosphatase 82 55 - 135 U/L    AST 82 (H) 10 - 40 U/L    ALT 71 (H) 10 - 44 U/L    Anion Gap 11 8 - 16 mmol/L    eGFR if African American >60 >60 mL/min/1.73 m^2    eGFR if non African American >60 >60 mL/min/1.73 m^2           RADIOLOGY STUDIES:  I have personally reviewed the images performed.       BRAIN MRI: T2 hyperintensity in the posterior aspect with PRES       Assessment:  P     57 year old female with headache, visual disturbances and seizures who on exam having no focal deficits with elevated BP and MRI brain with T2 hyperintensity in the posterior aspect with PRES.  -Recommend aggressive BP management, may need IV antihypertensives, if not well controlled with Oral medicas.  -Recommend continue Keppra 500mg BID  for now and taper,    Recommend repeat MRI in 1 month.    Case seen and  discussed with       Will follow for additional input regarding management of current neurologic condition and monitor for any new signs/symptoms to suggest neurologic detrimental changes.     Appreciate the consult.     signature: Bri Covarrubias MD     Neurology PGY 3

## 2017-07-22 NOTE — NURSING
Dr. Galloway notified of blood pressure 198/96, heart rate is 70, patient hasn't had any sleep since she has been here, and appears anxious, new orders received, see MAR.

## 2017-07-22 NOTE — PROGRESS NOTES
"Personally interviewed and examined the patient reviewed MRI, chart and resident's note.  Agree with findings.  Last 3 sets of Josefina      Current Facility-Administered Medications:     acetaminophen tablet 650 mg, 650 mg, Oral, Q8H PRN, Cliff Smith PA-C, 650 mg at 07/22/17 0349    enoxaparin injection 40 mg, 40 mg, Subcutaneous, Daily, Gallo Diaz MD, 40 mg at 07/20/17 2124    hydrALAZINE injection 10 mg, 10 mg, Intravenous, Q8H PRN, Phong Galloway MD, 10 mg at 07/22/17 0534    hydrochlorothiazide tablet 25 mg, 25 mg, Oral, Daily, Cliff Smith PA-C, 25 mg at 07/22/17 0928    levetiracetam in NaCl (iso-os) IVPB 500 mg, 500 mg, Intravenous, Q12H, Cliff Smith PA-C, Last Rate: 200 mL/hr at 07/22/17 0810, 500 mg at 07/22/17 0810    loperamide capsule 2 mg, 2 mg, Oral, QID PRN, Cliff Smith PA-C, 2 mg at 07/21/17 1414    lorazepam injection 2 mg, 2 mg, Intravenous, Q2H PRN, Cliff Smith PA-C    nicotine 14 mg/24 hr 1 patch, 1 patch, Transdermal, Daily, Cliff Smith PA-C, 1 patch at 07/22/17 0815    nifedipine 24 hr tablet 30 mg, 30 mg, Oral, Daily, Cliff Smith PA-C, 30 mg at 07/22/17 0928    ondansetron injection 4 mg, 4 mg, Intravenous, Q6H PRN, Gallo Diaz MD, 4 mg at 07/22/17 0808    ondansetron injection 4 mg, 4 mg, Intravenous, Q6H PRN, Cliff Smith PA-C, 4 mg at 07/22/17 0809    trazodone tablet 50 mg, 50 mg, Oral, QHS, Tara Moeller MD, 50 mg at 07/21/17 2117    Vitals - 1 value per visit 7/18/2017 7/20/2017 7/22/2017   SYSTOLIC 117 - 185   DIASTOLIC 64 - 88   PULSE 66 - 87   TEMPERATURE 100.1 - 98.7   RESPIRATIONS 18 - 18   SPO2 95 - 98   Weight (lb) 120 - 119.71   Weight (kg) 54.432 - 54.3   HEIGHT 5' 6" 5' 6" -   BODY MASS INDEX 19.37 19.32 -   VISIT REPORT - - -   Pain Score  - - -   Some recent data might be hidden     Tox screen negative 7/20/2017    A/  1) hypertension  2) press secondary to acute rise in blood " pressure.  3) generalized seizure secondary to 2    P/ close monitoring of blood pressure. Consider changing to IV if pressure spikes again and ICU monitoring.    She does not need Keppra long term. We recommend continuing it at current dose for 3 weeks then taper to 250 bid x 1 week. 250 qd x 1 wk and then stop.      30 min were spent with the patient >50% counseling.  Explained PRES. Explained blood pressure management. Keppra tapering.

## 2017-07-22 NOTE — SUBJECTIVE & OBJECTIVE
Interval History:     Review of Systems   Constitutional: Negative.  Negative for chills, fever and unexpected weight change.   HENT: Negative for dental problem, ear pain and trouble swallowing.    Eyes: Positive for visual disturbance.   Respiratory: Negative for cough, shortness of breath and wheezing.    Cardiovascular: Negative for chest pain and leg swelling.   Gastrointestinal: Negative for abdominal distention, diarrhea and vomiting.   Endocrine: Negative for polydipsia and polyphagia.   Genitourinary: Negative for dysuria and flank pain.   Musculoskeletal: Negative for arthralgias, myalgias, neck pain and neck stiffness.   Skin: Negative for rash.   Allergic/Immunologic: Negative.    Neurological: Positive for headaches. Negative for syncope and weakness.   Psychiatric/Behavioral: Negative for agitation and behavioral problems.   All other systems reviewed and are negative.    Objective:     Vital Signs (Most Recent):  Temp: 98.8 °F (37.1 °C) (07/22/17 1143)  Pulse: 66 (07/22/17 1143)  Resp: 18 (07/22/17 1143)  BP: (!) 199/87 (07/22/17 1143)  SpO2: 100 % (07/22/17 1143) Vital Signs (24h Range):  Temp:  [98.7 °F (37.1 °C)-99 °F (37.2 °C)] 98.8 °F (37.1 °C)  Pulse:  [66-87] 66  Resp:  [16-18] 18  SpO2:  [96 %-100 %] 100 %  BP: (157-199)/(72-98) 199/87     Weight: 54.3 kg (119 lb 11.4 oz)  Body mass index is 19.32 kg/m².    Intake/Output Summary (Last 24 hours) at 07/22/17 1501  Last data filed at 07/22/17 0558   Gross per 24 hour   Intake              860 ml   Output                0 ml   Net              860 ml      Physical Exam   Constitutional: She is oriented to person, place, and time. She appears well-developed and well-nourished.  Non-toxic appearance. She does not have a sickly appearance.   HENT:   Head: Normocephalic and atraumatic.   Right Ear: Hearing normal.   Left Ear: Hearing normal.   Mouth/Throat: Oropharynx is clear and moist and mucous membranes are normal.   Eyes: Conjunctivae and EOM are  normal. Pupils are equal, round, and reactive to light.   Right periorbital ecchymosis, no proptosis, pupil round, reactive   Neck: Normal range of motion. Neck supple. Carotid bruit is not present. No Brudzinski's sign and no Kernig's sign noted.   Cardiovascular: Normal rate, regular rhythm, S1 normal, S2 normal, normal heart sounds, intact distal pulses and normal pulses.  Exam reveals no gallop and no S3.    No murmur heard.  Pulmonary/Chest: Effort normal and breath sounds normal. No accessory muscle usage. No respiratory distress. She has no wheezes. She has no rales.   Abdominal: Soft. Normal appearance and bowel sounds are normal. She exhibits no distension and no pulsatile midline mass. There is no hepatosplenomegaly. There is tenderness.   Tender to superficial palpation, soft, normal bowel sounds   Musculoskeletal: Normal range of motion.   Neurological: She is alert and oriented to person, place, and time. She has normal strength and normal reflexes. No cranial nerve deficit or sensory deficit. She exhibits normal muscle tone. Coordination normal. GCS eye subscore is 4. GCS verbal subscore is 5. GCS motor subscore is 6.   No cranial nerve deficit   Skin: Skin is warm, dry and intact. Capillary refill takes less than 2 seconds. No rash noted.   Psychiatric: She has a normal mood and affect. Her speech is normal and behavior is normal. Judgment and thought content normal. Cognition and memory are normal.   Nursing note and vitals reviewed.      Significant Labs:   Recent Lab Results       07/22/17  0454      Albumin 3.7      3.7      3.7     Alkaline Phosphatase 82      82      82     ALT 71(H)      71(H)      71(H)     Anion Gap 11      11      11     AST 82(H)      82(H)      82(H)     Baso # 0.02      0.02      0.02     Basophil% 0.3      0.3      0.3     Total Bilirubin 1.1  Comment:  For infants and newborns, interpretation of results should be based  on gestational age, weight and in agreement with  clinical  observations.  Premature Infant recommended reference ranges:  Up to 24 hours.............<8.0 mg/dL  Up to 48 hours............<12.0 mg/dL  3-5 days..................<15.0 mg/dL  6-29 days.................<15.0 mg/dL  (H)      1.1  Comment:  For infants and newborns, interpretation of results should be based  on gestational age, weight and in agreement with clinical  observations.  Premature Infant recommended reference ranges:  Up to 24 hours.............<8.0 mg/dL  Up to 48 hours............<12.0 mg/dL  3-5 days..................<15.0 mg/dL  6-29 days.................<15.0 mg/dL  (H)      1.1  Comment:  For infants and newborns, interpretation of results should be based  on gestational age, weight and in agreement with clinical  observations.  Premature Infant recommended reference ranges:  Up to 24 hours.............<8.0 mg/dL  Up to 48 hours............<12.0 mg/dL  3-5 days..................<15.0 mg/dL  6-29 days.................<15.0 mg/dL  (H)     BUN, Bld 3(L)      3(L)      3(L)     Calcium 9.1      9.1      9.1     Chloride 104      104      104     CO2 25      25      25     Creatinine 0.7      0.7      0.7     Differential Method Automated      Automated      Automated     eGFR if  >60      >60      >60     eGFR if non  >60  Comment:  Calculation used to obtain the estimated glomerular filtration  rate (eGFR) is the CKD-EPI equation. Since race is unknown   in our information system, the eGFR values for   -American and Non--American patients are given   for each creatinine result.        >60  Comment:  Calculation used to obtain the estimated glomerular filtration  rate (eGFR) is the CKD-EPI equation. Since race is unknown   in our information system, the eGFR values for   -American and Non--American patients are given   for each creatinine result.        >60  Comment:  Calculation used to obtain the estimated glomerular  filtration  rate (eGFR) is the CKD-EPI equation. Since race is unknown   in our information system, the eGFR values for   -American and Non--American patients are given   for each creatinine result.       Eos # 0.1      0.1      0.1     Eosinophil% 0.9      0.9      0.9     Glucose 100      100      100     Gran # 4.4      4.4      4.4     Gran% 59.1      59.1      59.1     Hematocrit 37.4      37.4      37.4     Hemoglobin 12.7      12.7      12.7     Lymph # 2.1      2.1      2.1     Lymph% 28.9      28.9      28.9     Magnesium 2.0      2.0     MCH 33.2(H)      33.2(H)      33.2(H)     MCHC 34.0      34.0      34.0     MCV 98      98      98     Mono # 0.8      0.8      0.8     Mono% 10.7      10.7      10.7     MPV 10.4      10.4      10.4     Phosphorus 2.9      2.9     Platelets 190      190      190     Potassium 3.9      3.9      3.9     Total Protein 6.7      6.7      6.7     RBC 3.82(L)      3.82(L)      3.82(L)     RDW 14.0      14.0      14.0     Sodium 140      140      140     WBC 7.37      7.37      7.37           Significant Imaging: I have reviewed and interpreted all pertinent imaging results/findings within the past 24 hours.

## 2017-07-22 NOTE — PROGRESS NOTES
[facility] Hospital Medicine  Progress Note    Patient Name: Earl Abdul  MRN: [id number]  Patient Class: OP- Observation   Admission Date: 7/20/2017  Length of Stay: 1 days  Attending Physician: Tara Moeller MD  Primary Care Provider: oDrota Galvin MD        Subjective:     Principal Problem:PRES (posterior reversible encephalopathy syndrome)    HPI:  Abdominal Pain        C/o mid abd pain with N/V/D. Was seen at [facility] on Tuesday for same s/s. Diagnosed with irritation to lower bowel.     58 yo female discharged from ED yesterday after being seen for abdominal pain that is an ongoing issue.  She had a witnessed tonic clonic seizure in the parking lot, lasting about two minutes.  She has not had seizures before.  She was brought back to ED in a post ictal state.  She has a history of drug and alcohol abuse but claims sobriety for three weeks since entering rehab    Hospital Course:  No further seizure activity  Complains of blurred vision and headache but improving  MRI reveals PRES    Seen by neurology    Interval History:     Review of Systems   Constitutional: Negative.  Negative for chills, fever and unexpected weight change.   HENT: Negative for dental problem, ear pain and trouble swallowing.    Eyes: Positive for visual disturbance.   Respiratory: Negative for cough, shortness of breath and wheezing.    Cardiovascular: Negative for chest pain and leg swelling.   Gastrointestinal: Negative for abdominal distention, diarrhea and vomiting.   Endocrine: Negative for polydipsia and polyphagia.   Genitourinary: Negative for dysuria and flank pain.   Musculoskeletal: Negative for arthralgias, myalgias, neck pain and neck stiffness.   Skin: Negative for rash.   Allergic/Immunologic: Negative.    Neurological: Positive for headaches. Negative for syncope and weakness.   Psychiatric/Behavioral: Negative for agitation and behavioral problems.   All other systems reviewed and are  negative.    Objective:     Vital Signs (Most Recent):  Temp: 98.8 °F (37.1 °C) (07/22/17 1143)  Pulse: 66 (07/22/17 1143)  Resp: 18 (07/22/17 1143)  BP: (!) 199/87 (07/22/17 1143)  SpO2: 100 % (07/22/17 1143) Vital Signs (24h Range):  Temp:  [98.7 °F (37.1 °C)-99 °F (37.2 °C)] 98.8 °F (37.1 °C)  Pulse:  [66-87] 66  Resp:  [16-18] 18  SpO2:  [96 %-100 %] 100 %  BP: (157-199)/(72-98) 199/87     Weight: 54.3 kg (119 lb 11.4 oz)  Body mass index is 19.32 kg/m².    Intake/Output Summary (Last 24 hours) at 07/22/17 1501  Last data filed at 07/22/17 0558   Gross per 24 hour   Intake              860 ml   Output                0 ml   Net              860 ml      Physical Exam   Constitutional: She is oriented to person, place, and time. She appears well-developed and well-nourished.  Non-toxic appearance. She does not have a sickly appearance.   HENT:   Head: Normocephalic and atraumatic.   Right Ear: Hearing normal.   Left Ear: Hearing normal.   Mouth/Throat: Oropharynx is clear and moist and mucous membranes are normal.   Eyes: Conjunctivae and EOM are normal. Pupils are equal, round, and reactive to light.   Right periorbital ecchymosis, no proptosis, pupil round, reactive   Neck: Normal range of motion. Neck supple. Carotid bruit is not present. No Brudzinski's sign and no Kernig's sign noted.   Cardiovascular: Normal rate, regular rhythm, S1 normal, S2 normal, normal heart sounds, intact distal pulses and normal pulses.  Exam reveals no gallop and no S3.    No murmur heard.  Pulmonary/Chest: Effort normal and breath sounds normal. No accessory muscle usage. No respiratory distress. She has no wheezes. She has no rales.   Abdominal: Soft. Normal appearance and bowel sounds are normal. She exhibits no distension and no pulsatile midline mass. There is no hepatosplenomegaly. There is tenderness.   Tender to superficial palpation, soft, normal bowel sounds   Musculoskeletal: Normal range of motion.   Neurological: She is  alert and oriented to person, place, and time. She has normal strength and normal reflexes. No cranial nerve deficit or sensory deficit. She exhibits normal muscle tone. Coordination normal. GCS eye subscore is 4. GCS verbal subscore is 5. GCS motor subscore is 6.   No cranial nerve deficit   Skin: Skin is warm, dry and intact. Capillary refill takes less than 2 seconds. No rash noted.   Psychiatric: She has a normal mood and affect. Her speech is normal and behavior is normal. Judgment and thought content normal. Cognition and memory are normal.   Nursing note and vitals reviewed.      Significant Labs:   Recent Lab Results       07/22/17  0454      Albumin 3.7      3.7      3.7     Alkaline Phosphatase 82      82      82     ALT 71(H)      71(H)      71(H)     Anion Gap 11      11      11     AST 82(H)      82(H)      82(H)     Baso # 0.02      0.02      0.02     Basophil% 0.3      0.3      0.3     Total Bilirubin 1.1  Comment:  For infants and newborns, interpretation of results should be based  on gestational age, weight and in agreement with clinical  observations.  Premature Infant recommended reference ranges:  Up to 24 hours.............<8.0 mg/dL  Up to 48 hours............<12.0 mg/dL  3-5 days..................<15.0 mg/dL  6-29 days.................<15.0 mg/dL  (H)      1.1  Comment:  For infants and newborns, interpretation of results should be based  on gestational age, weight and in agreement with clinical  observations.  Premature Infant recommended reference ranges:  Up to 24 hours.............<8.0 mg/dL  Up to 48 hours............<12.0 mg/dL  3-5 days..................<15.0 mg/dL  6-29 days.................<15.0 mg/dL  (H)      1.1  Comment:  For infants and newborns, interpretation of results should be based  on gestational age, weight and in agreement with clinical  observations.  Premature Infant recommended reference ranges:  Up to 24 hours.............<8.0 mg/dL  Up to 48 hours............<12.0  mg/dL  3-5 days..................<15.0 mg/dL  6-29 days.................<15.0 mg/dL  (H)     BUN, Bld 3(L)      3(L)      3(L)     Calcium 9.1      9.1      9.1     Chloride 104      104      104     CO2 25      25      25     Creatinine 0.7      0.7      0.7     Differential Method Automated      Automated      Automated     eGFR if  >60      >60      >60     eGFR if non  >60  Comment:  Calculation used to obtain the estimated glomerular filtration  rate (eGFR) is the CKD-EPI equation. Since race is unknown   in our information system, the eGFR values for   -American and Non--American patients are given   for each creatinine result.        >60  Comment:  Calculation used to obtain the estimated glomerular filtration  rate (eGFR) is the CKD-EPI equation. Since race is unknown   in our information system, the eGFR values for   -American and Non--American patients are given   for each creatinine result.        >60  Comment:  Calculation used to obtain the estimated glomerular filtration  rate (eGFR) is the CKD-EPI equation. Since race is unknown   in our information system, the eGFR values for   -American and Non--American patients are given   for each creatinine result.       Eos # 0.1      0.1      0.1     Eosinophil% 0.9      0.9      0.9     Glucose 100      100      100     Gran # 4.4      4.4      4.4     Gran% 59.1      59.1      59.1     Hematocrit 37.4      37.4      37.4     Hemoglobin 12.7      12.7      12.7     Lymph # 2.1      2.1      2.1     Lymph% 28.9      28.9      28.9     Magnesium 2.0      2.0     MCH 33.2(H)      33.2(H)      33.2(H)     MCHC 34.0      34.0      34.0     MCV 98      98      98     Mono # 0.8      0.8      0.8     Mono% 10.7      10.7      10.7     MPV 10.4      10.4      10.4     Phosphorus 2.9      2.9     Platelets 190      190      190     Potassium 3.9      3.9      3.9     Total Protein 6.7      6.7       6.7     RBC 3.82(L)      3.82(L)      3.82(L)     RDW 14.0      14.0      14.0     Sodium 140      140      140     WBC 7.37      7.37      7.37           Significant Imaging: I have reviewed and interpreted all pertinent imaging results/findings within the past 24 hours.    Assessment/Plan:      New onset seizure    keppra load  Neurology consult  MRI brain reveals PRES, will neep keppra x 1 month with taper          * PRES (posterior reversible encephalopathy syndrome)    BP control  Didn't respond to procardia/hctz  Add lisinopril, if no improvement cardene drip          Abdominal pain    I suspect this is a manifestation of narcotic withdrawal.  Soft abdomen, normal studies.  She is out of her chronic narcotic.  I suggested she see her pain management physician across the street, who was notified          Substance abuse    Claims sobriety for three weeks          Diarrhea    Doubt c diff.  Symptomatic treatment          Tobacco abuse    Counseled to cease            VTE Risk Mitigation         Ordered     enoxaparin injection 40 mg  Daily     Route:  Subcutaneous        07/20/17 2106     Medium Risk of VTE  Once      07/20/17 2105     Place sequential compression device  Until discontinued      07/20/17 2105          Cliff Smith PA-C  Department of Hospital Medicine   [facility]

## 2017-07-23 VITALS
RESPIRATION RATE: 18 BRPM | OXYGEN SATURATION: 96 % | HEIGHT: 66 IN | TEMPERATURE: 98 F | DIASTOLIC BLOOD PRESSURE: 91 MMHG | WEIGHT: 119.5 LBS | BODY MASS INDEX: 19.2 KG/M2 | HEART RATE: 70 BPM | SYSTOLIC BLOOD PRESSURE: 168 MMHG

## 2017-07-23 DIAGNOSIS — I67.83 PRES (POSTERIOR REVERSIBLE ENCEPHALOPATHY SYNDROME): Primary | ICD-10-CM

## 2017-07-23 LAB
ALBUMIN SERPL BCP-MCNC: 3.5 G/DL
ALBUMIN SERPL BCP-MCNC: 3.5 G/DL
ALP SERPL-CCNC: 86 U/L
ALP SERPL-CCNC: 86 U/L
ALT SERPL W/O P-5'-P-CCNC: 64 U/L
ALT SERPL W/O P-5'-P-CCNC: 64 U/L
ANION GAP SERPL CALC-SCNC: 10 MMOL/L
ANION GAP SERPL CALC-SCNC: 10 MMOL/L
AST SERPL-CCNC: 60 U/L
AST SERPL-CCNC: 60 U/L
BASOPHILS # BLD AUTO: 0.02 K/UL
BASOPHILS # BLD AUTO: 0.02 K/UL
BASOPHILS NFR BLD: 0.2 %
BASOPHILS NFR BLD: 0.2 %
BILIRUB SERPL-MCNC: 0.6 MG/DL
BILIRUB SERPL-MCNC: 0.6 MG/DL
BUN SERPL-MCNC: 5 MG/DL
BUN SERPL-MCNC: 5 MG/DL
CALCIUM SERPL-MCNC: 8.7 MG/DL
CALCIUM SERPL-MCNC: 8.7 MG/DL
CHLORIDE SERPL-SCNC: 100 MMOL/L
CHLORIDE SERPL-SCNC: 100 MMOL/L
CO2 SERPL-SCNC: 25 MMOL/L
CO2 SERPL-SCNC: 25 MMOL/L
CREAT SERPL-MCNC: 0.7 MG/DL
CREAT SERPL-MCNC: 0.7 MG/DL
DIFFERENTIAL METHOD: ABNORMAL
DIFFERENTIAL METHOD: ABNORMAL
EOSINOPHIL # BLD AUTO: 0.1 K/UL
EOSINOPHIL # BLD AUTO: 0.1 K/UL
EOSINOPHIL NFR BLD: 1.5 %
EOSINOPHIL NFR BLD: 1.5 %
ERYTHROCYTE [DISTWIDTH] IN BLOOD BY AUTOMATED COUNT: 13.9 %
ERYTHROCYTE [DISTWIDTH] IN BLOOD BY AUTOMATED COUNT: 13.9 %
EST. GFR  (AFRICAN AMERICAN): >60 ML/MIN/1.73 M^2
EST. GFR  (AFRICAN AMERICAN): >60 ML/MIN/1.73 M^2
EST. GFR  (NON AFRICAN AMERICAN): >60 ML/MIN/1.73 M^2
EST. GFR  (NON AFRICAN AMERICAN): >60 ML/MIN/1.73 M^2
GLUCOSE SERPL-MCNC: 108 MG/DL
GLUCOSE SERPL-MCNC: 108 MG/DL
HCT VFR BLD AUTO: 38 %
HCT VFR BLD AUTO: 38 %
HGB BLD-MCNC: 12.9 G/DL
HGB BLD-MCNC: 12.9 G/DL
LYMPHOCYTES # BLD AUTO: 3.4 K/UL
LYMPHOCYTES # BLD AUTO: 3.4 K/UL
LYMPHOCYTES NFR BLD: 40.9 %
LYMPHOCYTES NFR BLD: 40.9 %
MAGNESIUM SERPL-MCNC: 1.9 MG/DL
MCH RBC QN AUTO: 32.7 PG
MCH RBC QN AUTO: 32.7 PG
MCHC RBC AUTO-ENTMCNC: 33.9 G/DL
MCHC RBC AUTO-ENTMCNC: 33.9 G/DL
MCV RBC AUTO: 96 FL
MCV RBC AUTO: 96 FL
MONOCYTES # BLD AUTO: 0.8 K/UL
MONOCYTES # BLD AUTO: 0.8 K/UL
MONOCYTES NFR BLD: 9.3 %
MONOCYTES NFR BLD: 9.3 %
NEUTROPHILS # BLD AUTO: 3.9 K/UL
NEUTROPHILS # BLD AUTO: 3.9 K/UL
NEUTROPHILS NFR BLD: 47.9 %
NEUTROPHILS NFR BLD: 47.9 %
PHOSPHATE SERPL-MCNC: 4.2 MG/DL
PLATELET # BLD AUTO: 225 K/UL
PLATELET # BLD AUTO: 225 K/UL
PMV BLD AUTO: 10.3 FL
PMV BLD AUTO: 10.3 FL
POTASSIUM SERPL-SCNC: 3.2 MMOL/L
POTASSIUM SERPL-SCNC: 3.2 MMOL/L
PROT SERPL-MCNC: 6.6 G/DL
PROT SERPL-MCNC: 6.6 G/DL
RBC # BLD AUTO: 3.95 M/UL
RBC # BLD AUTO: 3.95 M/UL
SODIUM SERPL-SCNC: 135 MMOL/L
SODIUM SERPL-SCNC: 135 MMOL/L
VIT B1 SERPL-MCNC: 45 UG/L (ref 38–122)
WBC # BLD AUTO: 8.19 K/UL
WBC # BLD AUTO: 8.19 K/UL

## 2017-07-23 PROCEDURE — 84100 ASSAY OF PHOSPHORUS: CPT

## 2017-07-23 PROCEDURE — 63600175 PHARM REV CODE 636 W HCPCS

## 2017-07-23 PROCEDURE — G0378 HOSPITAL OBSERVATION PER HR: HCPCS

## 2017-07-23 PROCEDURE — 25000003 PHARM REV CODE 250

## 2017-07-23 PROCEDURE — 83735 ASSAY OF MAGNESIUM: CPT

## 2017-07-23 PROCEDURE — 36415 COLL VENOUS BLD VENIPUNCTURE: CPT

## 2017-07-23 PROCEDURE — 80053 COMPREHEN METABOLIC PANEL: CPT

## 2017-07-23 PROCEDURE — 80074 ACUTE HEPATITIS PANEL: CPT

## 2017-07-23 PROCEDURE — 85025 COMPLETE CBC W/AUTO DIFF WBC: CPT

## 2017-07-23 PROCEDURE — 99217 PR OBSERVATION CARE DISCHARGE: CPT | Mod: ,,,

## 2017-07-23 RX ORDER — METOPROLOL SUCCINATE 50 MG/1
50 TABLET, EXTENDED RELEASE ORAL DAILY
Qty: 90 TABLET | Refills: 0 | Status: SHIPPED | OUTPATIENT
Start: 2017-07-23 | End: 2017-09-11

## 2017-07-23 RX ORDER — LORAZEPAM 2 MG/1
2 TABLET ORAL NIGHTLY PRN
Qty: 7 TABLET | Refills: 0 | Status: SHIPPED | OUTPATIENT
Start: 2017-07-23 | End: 2017-08-22

## 2017-07-23 RX ORDER — HYDROCHLOROTHIAZIDE 25 MG/1
25 TABLET ORAL DAILY
Qty: 90 TABLET | Refills: 0 | Status: SHIPPED | OUTPATIENT
Start: 2017-07-23 | End: 2017-09-11

## 2017-07-23 RX ORDER — LEVETIRACETAM 250 MG/1
250 TABLET ORAL 2 TIMES DAILY
Qty: 103 TABLET | Refills: 0 | Status: SHIPPED | OUTPATIENT
Start: 2017-07-23 | End: 2017-07-26 | Stop reason: ALTCHOICE

## 2017-07-23 RX ORDER — LEVETIRACETAM 250 MG/1
250 TABLET ORAL 2 TIMES DAILY
Qty: 103 TABLET | Refills: 0 | Status: SHIPPED | OUTPATIENT
Start: 2017-07-23 | End: 2017-07-23

## 2017-07-23 RX ADMIN — NIFEDIPINE 30 MG: 30 TABLET, FILM COATED, EXTENDED RELEASE ORAL at 09:07

## 2017-07-23 RX ADMIN — ACETAMINOPHEN 650 MG: 325 TABLET ORAL at 06:07

## 2017-07-23 RX ADMIN — ONDANSETRON 4 MG: 2 INJECTION INTRAMUSCULAR; INTRAVENOUS at 05:07

## 2017-07-23 RX ADMIN — METOPROLOL SUCCINATE 50 MG: 50 TABLET, EXTENDED RELEASE ORAL at 09:07

## 2017-07-23 RX ADMIN — LEVETIRACETAM 500 MG: 5 INJECTION INTRAVENOUS at 09:07

## 2017-07-23 RX ADMIN — HYDROCHLOROTHIAZIDE 25 MG: 25 TABLET ORAL at 09:07

## 2017-07-23 NOTE — DISCHARGE SUMMARY
[facility] Hospital Medicine  Discharge Summary      Patient Name: Earl Abdul  MRN: [id number]  Admission Date: 7/20/2017  Hospital Length of Stay: 1 days  Discharge Date and Time:  07/23/2017 8:31 AM  Attending Physician: Tara Moeller MD   Discharging Provider: Cliff Smith PA-C  Primary Care Provider: Dorota Galvin MD      HPI:   Abdominal Pain        C/o mid abd pain with N/V/D. Was seen at [facility] on Tuesday for same s/s. Diagnosed with irritation to lower bowel.     56 yo female discharged from ED yesterday after being seen for abdominal pain that is an ongoing issue.  She had a witnessed tonic clonic seizure in the parking lot, lasting about two minutes.  She has not had seizures before.  She was brought back to ED in a post ictal state.  She has a history of drug and alcohol abuse but claims sobriety for three weeks since entering rehab    * No surgery found *      Indwelling Lines/Drains at time of discharge:   Lines/Drains/Airways          No matching active lines, drains, or airways        Hospital Course:   No further seizure activity  Complains of blurred vision and headache but improving  MRI reveals PRES syndrome, presumably due to hypertension.  She was previously hypertensive but off meds for some time    Seen by neurology.  Started on keppra, no further seizures  Blood pressure controlled with toprol, hctz    Will set up with neurology in 4 weeks for repeat mri, check electrolytes 2 weeks  No driving     Consults:   Consults         Status Ordering Provider     Inpatient consult to Gastroenterology  Once     Provider:  Eldon Keith MD    Acknowledged CLIFF SMITH     Inpatient consult to Neurology  Once     Provider:  Emery Piedra MD    Completed CLIFF SMITH          Significant Diagnostic Studies: Labs:   BMP:   Recent Labs  Lab 07/22/17  0454 07/23/17  0449     100  100 108  108     140  140 135*  135*   K 3.9   3.9  3.9 3.2*  3.2*     104  104 100  100   CO2 25  25  25 25  25   BUN 3*  3*  3* 5*  5*   CREATININE 0.7  0.7  0.7 0.7  0.7   CALCIUM 9.1  9.1  9.1 8.7  8.7   MG 2.0  2.0 1.9   , CMP   Recent Labs  Lab 07/22/17  0454 07/23/17  0449     140  140 135*  135*   K 3.9  3.9  3.9 3.2*  3.2*     104  104 100  100   CO2 25  25  25 25  25     100  100 108  108   BUN 3*  3*  3* 5*  5*   CREATININE 0.7  0.7  0.7 0.7  0.7   CALCIUM 9.1  9.1  9.1 8.7  8.7   PROT 6.7  6.7  6.7 6.6  6.6   ALBUMIN 3.7  3.7  3.7 3.5  3.5   BILITOT 1.1*  1.1*  1.1* 0.6  0.6   ALKPHOS 82  82  82 86  86   AST 82*  82*  82* 60*  60*   ALT 71*  71*  71* 64*  64*   ANIONGAP 11  11  11 10  10   ESTGFRAFRICA >60  >60  >60 >60  >60   EGFRNONAA >60  >60  >60 >60  >60    and CBC   Recent Labs  Lab 07/22/17  0454 07/23/17  0449   WBC 7.37  7.37  7.37 8.19  8.19   HGB 12.7  12.7  12.7 12.9  12.9   HCT 37.4  37.4  37.4 38.0  38.0     190  190 225  225       Pending Diagnostic Studies:     None        Final Active Diagnoses:    Diagnosis Date Noted POA    PRINCIPAL PROBLEM:  PRES (posterior reversible encephalopathy syndrome) [I67.83] 07/22/2017 Yes    New onset seizure [R56.9] 07/20/2017 Yes    Abdominal pain [R10.9] 07/20/2017 Yes    Intractable nausea and vomiting [R11.2] 07/20/2017 Yes    Substance abuse [F19.10] 10/15/2014 Yes    Diarrhea [R19.7] 10/15/2014 Yes    Tobacco abuse [Z72.0] 02/07/2014 Yes      Problems Resolved During this Admission:    Diagnosis Date Noted Date Resolved POA    Hypokalemia [E87.6] 10/16/2014 10/19/2014 Yes      New onset seizure    keppra load  Neurology consult  MRI brain reveals PRES, will need keppra x 1 month with taper          * PRES (posterior reversible encephalopathy syndrome)    BP control with toprol, hctz  Repeat mri 4 weeks          Substance abuse    Claims sobriety for three weeks           Tobacco abuse    Counseled to cease              Discharged Condition: good    Disposition: Home or Self Care    Follow Up:  Follow-up Information     Dorota Galvin MD In 1 day.    Specialty:  Internal Medicine  Contact information:  [address]  [facility] [id number] [phone]             Emery Piedra MD In 1 month.    Specialty:  Neurology  Contact information:  [address]  [address]  [facility] [id number] [phone]                 Patient Instructions:     Diet general     Diet general     Activity as tolerated     Other restrictions (specify):   Scheduling Instructions: No driving until cleared by neurology       Medications:  Reconciled Home Medications:   Current Discharge Medication List      START taking these medications    Details   hydrochlorothiazide (HYDRODIURIL) 25 MG tablet Take 1 tablet (25 mg total) by mouth once daily.  Qty: 90 tablet, Refills: 0      levetiracetam (KEPPRA) 250 MG Tab Take 1 tablet (250 mg total) by mouth 2 (two) times daily. 2 tabs po bid x 21 days, then 1 tab po bid x 7 days, then 1 tab daily x 7 days, then dc  Qty: 103 tablet, Refills: 0      lorazepam (ATIVAN) 2 MG Tab Take 1 tablet (2 mg total) by mouth nightly as needed (insomnia).  Qty: 7 tablet, Refills: 0      metoprolol succinate (TOPROL-XL) 50 MG 24 hr tablet Take 1 tablet (50 mg total) by mouth once daily.  Qty: 90 tablet, Refills: 0         CONTINUE these medications which have NOT CHANGED    Details   baclofen (LIORESAL) 10 MG tablet Take 10 mg by mouth 3 (three) times daily as needed (for spasms).   Refills: 0      busPIRone (BUSPAR) 15 MG tablet Take 1 tablet by mouth 3 (three) times daily.       desvenlafaxine (PRISTIQ) 100 MG 24 hr tablet Take 1 tablet by mouth once daily.       dicyclomine (BENTYL) 20 mg tablet Take 1 tablet (20 mg total) by mouth 2 (two) times daily.  Qty: 20 tablet, Refills: 0      LIPASE/PROTEASE/AMYLASE (PANCRELIPASE EC ORAL) Take by mouth.      ondansetron  (ZOFRAN ODT) 4 MG TbDL Take 1 tablet (4 mg total) by mouth every 8 (eight) hours as needed (nausea).  Qty: 12 tablet, Refills: 0      pantoprazole (PROTONIX) 40 MG tablet Take 40 mg by mouth once daily.      trazodone (DESYREL) 100 MG tablet Take 200 mg by mouth every evening.       vitamin D 1000 units Tab Take 1,000 Units by mouth every evening.          STOP taking these medications       ciprofloxacin HCl (CIPRO) 500 MG tablet Comments:   Reason for Stopping:         metronidazole (FLAGYL) 500 MG tablet Comments:   Reason for Stopping:             Time spent on the discharge of patient: 45 minutes    Cliff Smith PA-C  Department of Hospital Medicine  [facility]

## 2017-07-24 LAB
BACTERIA STL CULT: NORMAL
BACTERIA STL CULT: NORMAL

## 2017-07-26 ENCOUNTER — HOSPITAL ENCOUNTER (OUTPATIENT)
Facility: OTHER | Age: 57
Discharge: HOME | End: 2017-07-28
Attending: EMERGENCY MEDICINE | Admitting: INTERNAL MEDICINE
Payer: COMMERCIAL

## 2017-07-26 DIAGNOSIS — R07.9 CHEST PAIN: ICD-10-CM

## 2017-07-26 DIAGNOSIS — R10.84 GENERALIZED ABDOMINAL PAIN: Primary | ICD-10-CM

## 2017-07-26 DIAGNOSIS — R19.7 DIARRHEA, UNSPECIFIED TYPE: ICD-10-CM

## 2017-07-26 DIAGNOSIS — R11.2 INTRACTABLE VOMITING WITH NAUSEA, UNSPECIFIED VOMITING TYPE: ICD-10-CM

## 2017-07-26 DIAGNOSIS — R10.13 EPIGASTRIC PAIN: ICD-10-CM

## 2017-07-26 LAB
ALBUMIN SERPL BCP-MCNC: 4.3 G/DL
ALP SERPL-CCNC: 95 U/L
ALT SERPL W/O P-5'-P-CCNC: 123 U/L
ANION GAP SERPL CALC-SCNC: 15 MMOL/L
AST SERPL-CCNC: 124 U/L
BASOPHILS # BLD AUTO: 0.02 K/UL
BASOPHILS NFR BLD: 0.2 %
BILIRUB SERPL-MCNC: 1.3 MG/DL
BILIRUB UR QL STRIP: NEGATIVE
BUN SERPL-MCNC: 12 MG/DL
CALCIUM SERPL-MCNC: 9.7 MG/DL
CHLORIDE SERPL-SCNC: 91 MMOL/L
CLARITY UR: CLEAR
CO2 SERPL-SCNC: 26 MMOL/L
COLOR UR: YELLOW
CREAT SERPL-MCNC: 0.9 MG/DL
DIFFERENTIAL METHOD: ABNORMAL
EOSINOPHIL # BLD AUTO: 0.1 K/UL
EOSINOPHIL NFR BLD: 0.7 %
ERYTHROCYTE [DISTWIDTH] IN BLOOD BY AUTOMATED COUNT: 13.7 %
EST. GFR  (AFRICAN AMERICAN): >60 ML/MIN/1.73 M^2
EST. GFR  (NON AFRICAN AMERICAN): >60 ML/MIN/1.73 M^2
ETHANOL SERPL-MCNC: <10 MG/DL
GLUCOSE SERPL-MCNC: 103 MG/DL
GLUCOSE UR QL STRIP: NEGATIVE
HCT VFR BLD AUTO: 40.5 %
HGB BLD-MCNC: 14 G/DL
HGB UR QL STRIP: NEGATIVE
KETONES UR QL STRIP: NEGATIVE
LEUKOCYTE ESTERASE UR QL STRIP: NEGATIVE
LIPASE SERPL-CCNC: 13 U/L
LYMPHOCYTES # BLD AUTO: 2.7 K/UL
LYMPHOCYTES NFR BLD: 29.1 %
MCH RBC QN AUTO: 32.9 PG
MCHC RBC AUTO-ENTMCNC: 34.6 G/DL
MCV RBC AUTO: 95 FL
MONOCYTES # BLD AUTO: 0.9 K/UL
MONOCYTES NFR BLD: 9.3 %
NEUTROPHILS # BLD AUTO: 5.6 K/UL
NEUTROPHILS NFR BLD: 60.6 %
NITRITE UR QL STRIP: NEGATIVE
PH UR STRIP: 6 [PH] (ref 5–8)
PLATELET # BLD AUTO: 282 K/UL
PMV BLD AUTO: 10.6 FL
POTASSIUM SERPL-SCNC: 3.2 MMOL/L
PROT SERPL-MCNC: 7.9 G/DL
PROT UR QL STRIP: NEGATIVE
RBC # BLD AUTO: 4.26 M/UL
SODIUM SERPL-SCNC: 132 MMOL/L
SP GR UR STRIP: 1.01 (ref 1–1.03)
URN SPEC COLLECT METH UR: NORMAL
UROBILINOGEN UR STRIP-ACNC: NEGATIVE EU/DL
WBC # BLD AUTO: 9.16 K/UL

## 2017-07-26 PROCEDURE — 80053 COMPREHEN METABOLIC PANEL: CPT

## 2017-07-26 PROCEDURE — 63600175 PHARM REV CODE 636 W HCPCS: Performed by: EMERGENCY MEDICINE

## 2017-07-26 PROCEDURE — S4991 NICOTINE PATCH NONLEGEND: HCPCS | Performed by: INTERNAL MEDICINE

## 2017-07-26 PROCEDURE — 80320 DRUG SCREEN QUANTALCOHOLS: CPT

## 2017-07-26 PROCEDURE — 85025 COMPLETE CBC W/AUTO DIFF WBC: CPT

## 2017-07-26 PROCEDURE — 25000003 PHARM REV CODE 250: Performed by: INTERNAL MEDICINE

## 2017-07-26 PROCEDURE — G0378 HOSPITAL OBSERVATION PER HR: HCPCS

## 2017-07-26 PROCEDURE — 96375 TX/PRO/DX INJ NEW DRUG ADDON: CPT

## 2017-07-26 PROCEDURE — 96374 THER/PROPH/DIAG INJ IV PUSH: CPT

## 2017-07-26 PROCEDURE — 96361 HYDRATE IV INFUSION ADD-ON: CPT

## 2017-07-26 PROCEDURE — 25000003 PHARM REV CODE 250: Performed by: EMERGENCY MEDICINE

## 2017-07-26 PROCEDURE — 87449 NOS EACH ORGANISM AG IA: CPT

## 2017-07-26 PROCEDURE — 99285 EMERGENCY DEPT VISIT HI MDM: CPT | Mod: 25

## 2017-07-26 PROCEDURE — 63600175 PHARM REV CODE 636 W HCPCS: Performed by: INTERNAL MEDICINE

## 2017-07-26 PROCEDURE — 83690 ASSAY OF LIPASE: CPT

## 2017-07-26 PROCEDURE — 81003 URINALYSIS AUTO W/O SCOPE: CPT

## 2017-07-26 PROCEDURE — 93005 ELECTROCARDIOGRAM TRACING: CPT

## 2017-07-26 RX ORDER — IBUPROFEN 200 MG
1 TABLET ORAL DAILY
Status: DISCONTINUED | OUTPATIENT
Start: 2017-07-26 | End: 2017-07-28 | Stop reason: HOSPADM

## 2017-07-26 RX ORDER — SODIUM CHLORIDE 9 MG/ML
1000 INJECTION, SOLUTION INTRAVENOUS
Status: COMPLETED | OUTPATIENT
Start: 2017-07-26 | End: 2017-07-26

## 2017-07-26 RX ORDER — QUETIAPINE FUMARATE 25 MG/1
25 TABLET, FILM COATED ORAL DAILY
COMMUNITY
End: 2017-09-11

## 2017-07-26 RX ORDER — POTASSIUM CHLORIDE 750 MG/1
10 TABLET, EXTENDED RELEASE ORAL 2 TIMES DAILY
COMMUNITY
End: 2017-10-03

## 2017-07-26 RX ORDER — AMLODIPINE BESYLATE 5 MG/1
5 TABLET ORAL DAILY
COMMUNITY
End: 2017-09-11

## 2017-07-26 RX ORDER — KETOROLAC TROMETHAMINE 30 MG/ML
30 INJECTION, SOLUTION INTRAMUSCULAR; INTRAVENOUS
Status: COMPLETED | OUTPATIENT
Start: 2017-07-26 | End: 2017-07-26

## 2017-07-26 RX ORDER — ONDANSETRON 4 MG/1
4 TABLET, ORALLY DISINTEGRATING ORAL EVERY 8 HOURS PRN
Status: DISCONTINUED | OUTPATIENT
Start: 2017-07-26 | End: 2017-07-27

## 2017-07-26 RX ORDER — BUSPIRONE HYDROCHLORIDE 5 MG/1
15 TABLET ORAL 3 TIMES DAILY
Status: DISCONTINUED | OUTPATIENT
Start: 2017-07-26 | End: 2017-07-28 | Stop reason: HOSPADM

## 2017-07-26 RX ORDER — KETOROLAC TROMETHAMINE 30 MG/ML
15 INJECTION, SOLUTION INTRAMUSCULAR; INTRAVENOUS EVERY 6 HOURS PRN
Status: DISCONTINUED | OUTPATIENT
Start: 2017-07-26 | End: 2017-07-28 | Stop reason: HOSPADM

## 2017-07-26 RX ORDER — SODIUM CHLORIDE AND POTASSIUM CHLORIDE 150; 900 MG/100ML; MG/100ML
INJECTION, SOLUTION INTRAVENOUS CONTINUOUS
Status: DISCONTINUED | OUTPATIENT
Start: 2017-07-26 | End: 2017-07-28 | Stop reason: HOSPADM

## 2017-07-26 RX ORDER — LEVETIRACETAM 500 MG/1
500 TABLET ORAL 2 TIMES DAILY
Status: DISCONTINUED | OUTPATIENT
Start: 2017-07-26 | End: 2017-07-28 | Stop reason: HOSPADM

## 2017-07-26 RX ORDER — PANTOPRAZOLE SODIUM 40 MG/1
40 TABLET, DELAYED RELEASE ORAL DAILY
Status: DISCONTINUED | OUTPATIENT
Start: 2017-07-27 | End: 2017-07-28 | Stop reason: HOSPADM

## 2017-07-26 RX ORDER — HALOPERIDOL 5 MG/ML
5 INJECTION INTRAMUSCULAR
Status: COMPLETED | OUTPATIENT
Start: 2017-07-26 | End: 2017-07-26

## 2017-07-26 RX ORDER — DICYCLOMINE HYDROCHLORIDE 10 MG/1
20 CAPSULE ORAL 2 TIMES DAILY
Status: DISCONTINUED | OUTPATIENT
Start: 2017-07-26 | End: 2017-07-28 | Stop reason: HOSPADM

## 2017-07-26 RX ORDER — MORPHINE SULFATE 30 MG/1
30 TABLET, FILM COATED, EXTENDED RELEASE ORAL 2 TIMES DAILY PRN
Status: ON HOLD | COMMUNITY
End: 2017-07-28 | Stop reason: HOSPADM

## 2017-07-26 RX ORDER — QUETIAPINE FUMARATE 25 MG/1
25 TABLET, FILM COATED ORAL NIGHTLY
Status: DISCONTINUED | OUTPATIENT
Start: 2017-07-26 | End: 2017-07-28 | Stop reason: HOSPADM

## 2017-07-26 RX ORDER — ONDANSETRON 2 MG/ML
4 INJECTION INTRAMUSCULAR; INTRAVENOUS
Status: COMPLETED | OUTPATIENT
Start: 2017-07-26 | End: 2017-07-26

## 2017-07-26 RX ORDER — AMOXICILLIN 250 MG
1 CAPSULE ORAL 2 TIMES DAILY
Status: DISCONTINUED | OUTPATIENT
Start: 2017-07-26 | End: 2017-07-28 | Stop reason: HOSPADM

## 2017-07-26 RX ORDER — DESVENLAFAXINE SUCCINATE 50 MG/1
100 TABLET, EXTENDED RELEASE ORAL DAILY
Status: DISCONTINUED | OUTPATIENT
Start: 2017-07-27 | End: 2017-07-28 | Stop reason: HOSPADM

## 2017-07-26 RX ORDER — ENOXAPARIN SODIUM 100 MG/ML
40 INJECTION SUBCUTANEOUS EVERY 24 HOURS
Status: DISCONTINUED | OUTPATIENT
Start: 2017-07-26 | End: 2017-07-28 | Stop reason: HOSPADM

## 2017-07-26 RX ORDER — METOPROLOL SUCCINATE 50 MG/1
50 TABLET, EXTENDED RELEASE ORAL DAILY
Status: DISCONTINUED | OUTPATIENT
Start: 2017-07-27 | End: 2017-07-28 | Stop reason: HOSPADM

## 2017-07-26 RX ORDER — LOPERAMIDE HYDROCHLORIDE 2 MG/1
2 CAPSULE ORAL ONCE
Status: COMPLETED | OUTPATIENT
Start: 2017-07-26 | End: 2017-07-26

## 2017-07-26 RX ADMIN — LEVETIRACETAM 500 MG: 500 TABLET ORAL at 09:07

## 2017-07-26 RX ADMIN — HALOPERIDOL LACTATE 5 MG: 5 INJECTION, SOLUTION INTRAMUSCULAR at 03:07

## 2017-07-26 RX ADMIN — NICOTINE 1 PATCH: 21 PATCH, EXTENDED RELEASE TRANSDERMAL at 09:07

## 2017-07-26 RX ADMIN — ONDANSETRON 4 MG: 2 INJECTION INTRAMUSCULAR; INTRAVENOUS at 03:07

## 2017-07-26 RX ADMIN — SODIUM CHLORIDE 1000 ML: 0.9 INJECTION, SOLUTION INTRAVENOUS at 05:07

## 2017-07-26 RX ADMIN — KETOROLAC TROMETHAMINE 30 MG: 30 INJECTION, SOLUTION INTRAMUSCULAR at 03:07

## 2017-07-26 RX ADMIN — ONDANSETRON 4 MG: 4 TABLET, ORALLY DISINTEGRATING ORAL at 10:07

## 2017-07-26 RX ADMIN — BUSPIRONE HYDROCHLORIDE 15 MG: 5 TABLET ORAL at 09:07

## 2017-07-26 RX ADMIN — ENOXAPARIN SODIUM 40 MG: 100 INJECTION SUBCUTANEOUS at 06:07

## 2017-07-26 RX ADMIN — LOPERAMIDE HYDROCHLORIDE 2 MG: 2 CAPSULE ORAL at 09:07

## 2017-07-26 RX ADMIN — DICYCLOMINE HYDROCHLORIDE 20 MG: 10 CAPSULE ORAL at 09:07

## 2017-07-26 RX ADMIN — QUETIAPINE FUMARATE 25 MG: 25 TABLET, FILM COATED ORAL at 09:07

## 2017-07-26 RX ADMIN — SODIUM CHLORIDE AND POTASSIUM CHLORIDE: 9; 1.49 INJECTION, SOLUTION INTRAVENOUS at 06:07

## 2017-07-26 RX ADMIN — SODIUM CHLORIDE 1000 ML: 0.9 INJECTION, SOLUTION INTRAVENOUS at 03:07

## 2017-07-26 NOTE — ED TRIAGE NOTES
Pt presents with c/o 10/10 abdominal pain. Pain described as constant, aching pain to mid abd from suprapubic to epigastric for the past 1.5 wks. Pt reports nausea and emesis X 2 today. Pt took 3 extra strength tylenol today at 0830 with no relief. Last bm this morning, no diarrhea but had prior diarrheal episodes. Denies pain and bleeding with voiding and defecation. Pt also c/o HA and weakness. Reports hit head a few days ago post a fall, s/p bruising to right orbital. Pt also reports recent seizure.

## 2017-07-26 NOTE — ED NOTES
Pt rounding complete.  Pain 4/10.  Restroom and comfort needs addressed.  Pt updated on plan of care.  Call light within reach.  Will continue to monitor.

## 2017-07-26 NOTE — ED PROVIDER NOTES
Encounter Date: 7/26/2017    SCRIBE #1 NOTE: I, Sabina Lorenz, am scribing for, and in the presence of,  Dr. Jenkins I have scribed the entire note.       History     Chief Complaint   Patient presents with    Abdominal Pain     chronic abd pain, sent by Eldon Keith     Time seen by provider: 3:35 PM    This is a 57 y.o. female with with anemia, HLD, HTN, and PRES who presents with complaint of acute on chronic abdominal pain. She reports intermittent episodes that began several years ago with acute worsening 1 week ago. She notes associated fever, chills, diarrhea, nausea, and emesis. She reports she is unable to tolerate solids and liquids. She has taken Zofran, Phenergan, Bentyl, and Tylenol with mild improvement of symptoms. She was seen by her PCP today and her GI doctor and sent to ED for further evaluation and pain management. She notes chronic outpatient management with Dr. Keith (GI). She had an endoscopy last year to evaluate for these symptoms which showed inflammation but no other specific findings.  She also had a colonoscopy 3 years ago. She was discharged from the hospital 3 days ago after being diagnosed and treated for posterior reversible encephalopathy syndrome. She reports her goal BP is <80 diastolic. She notes R eye ecchymosis s/p fall 1 week ago. She had a CT head done just after with no abnormal findings. She denies dysuria or hematuria. She reports she is not allergic to Phenergan.       The history is provided by the patient.     Review of patient's allergies indicates:   Allergen Reactions    Fentanyl Other (See Comments)     Other reaction(s): Itching    Lortab  [hydrocodone-acetaminophen] Other (See Comments)    Phenothiazines     Promethazine Other (See Comments)     Other reaction(s): Itching    Promethazine hcl Hives     Past Medical History:   Diagnosis Date    Anemia     Anemia     Depression     Fatty liver     Hyperlipidemia     Hypertension     Polysubstance abuse      Posterior reversible encephalopathy syndrome     Sarcoidosis of lung     Sarcoidosis of lung     over 30 yrs ago    Seizures     7/2017    Suicide attempt     Suicide ideation      Past Surgical History:   Procedure Laterality Date    APPENDECTOMY      ESOPHAGOGASTRODUODENOSCOPY  10/7/2016, 11/6/2014    2016 - gastritis, duodenitis, 2014 erosive gastritis    FLEXIBLE SIGMOIDOSCOPY  11/06/2014    colitis    HYSTERECTOMY      mediastenoscopy      TONSILLECTOMY N/A 1970    TUBAL LIGATION       Family History   Problem Relation Age of Onset    Breast cancer Maternal Aunt     Colon cancer Maternal Uncle     Breast cancer Daughter     Ovarian cancer Neg Hx      Social History   Substance Use Topics    Smoking status: Current Every Day Smoker     Packs/day: 1.00     Years: 30.00     Types: Cigarettes    Smokeless tobacco: Never Used    Alcohol use No     Review of Systems   Constitutional: Positive for chills and fever.   HENT: Negative for sore throat.    Respiratory: Negative for shortness of breath.    Cardiovascular: Negative for chest pain.   Gastrointestinal: Positive for abdominal pain, diarrhea, nausea and vomiting.   Genitourinary: Negative for dysuria.   Musculoskeletal: Negative for back pain.   Skin: Negative for rash.        R eye ecchymosis.    Neurological: Negative for dizziness, weakness and headaches.   Hematological: Does not bruise/bleed easily.       Physical Exam     Initial Vitals [07/26/17 1241]   BP Pulse Resp Temp SpO2   (!) 159/75 73 16 98.8 °F (37.1 °C) 96 %      MAP       103         Physical Exam    Nursing note and vitals reviewed.  Constitutional: She appears well-developed and well-nourished. She is not diaphoretic. No distress.   HENT:   Head: Normocephalic and atraumatic.   R sided periorbital ecchymosis   Eyes: Conjunctivae are normal. Pupils are equal, round, and reactive to light. Right eye exhibits no discharge. Left eye exhibits no discharge.   Neck: Normal range  of motion. Neck supple.   Cardiovascular: Normal rate, regular rhythm and normal heart sounds.   Pulmonary/Chest: Breath sounds normal. No respiratory distress. She has no wheezes. She has no rhonchi. She has no rales.   Abdominal: Soft. Bowel sounds are normal. She exhibits no distension. There is tenderness (mild diffuse). There is no rebound and no guarding.   Musculoskeletal: Normal range of motion. She exhibits no edema or tenderness.   No CVA tenderness bilaterally   Neurological: She is alert and oriented to person, place, and time. She has normal strength. No sensory deficit.   Skin: Skin is warm and dry. No rash noted. No erythema. No pallor.   Psychiatric: She has a normal mood and affect. Her behavior is normal. Judgment and thought content normal.         ED Course   Procedures  Labs Reviewed   COMPREHENSIVE METABOLIC PANEL - Abnormal; Notable for the following:        Result Value    Sodium 132 (*)     Potassium 3.2 (*)     Chloride 91 (*)     Total Bilirubin 1.3 (*)      (*)      (*)     All other components within normal limits   CBC W/ AUTO DIFFERENTIAL - Abnormal; Notable for the following:     MCH 32.9 (*)     All other components within normal limits   LIPASE   ALCOHOL,MEDICAL (ETHANOL)   HEPATITIS PANEL, ACUTE   URINALYSIS   ALCOHOL,MEDICAL (ETHANOL)      Imaging Results    None         EKG Readings: (Independently Interpreted)   Initial Reading: No STEMI.   1531: Normal Sinus Rhythm. Rate of 71. Normal axis. Normal intervals. No ST or ischemic changes.          Medical Decision Making:   History:   Old Medical Records: I decided to obtain old medical records.  Old Records Summarized: records from clinic visits, records from previous admission(s) and other records.  Initial Assessment:   3:35PM:  Pt is a 56 y/o F who presents to ED with generalized abdominal pain, N/V/D.  Patient appears uncomfortable, but otherwise nontoxic in appearance.  Patient does have some chronic GI issues  that seem to be acutely worsening.  She has had recent ultrasounds and CT scans that were negative for any acute findings.  Will plan for labs, analgesia, IV fluids, will continue to follow and reassess.     Independently Interpreted Test(s):   I have ordered and independently interpreted EKG Reading(s) - see prior notes  Clinical Tests:   Lab Tests: Ordered and Reviewed  Radiological Study: Reviewed  Medical Tests: Ordered and Reviewed  Other:   I have discussed this case with another health care provider.      4:50PM:  Pt has remained stable, though does have a slight elevation of LFTs.  I discussed the case with Dr. Keith, who requests the patient to be admitted for EGD in the morning.  Would like pt to be NPO after midnight.      5:02 PM I consulted and discussed the case with Dr. Flores (internal medicine) who will admit the patient to this services.     5:26 PM:  Patient is feeling better at this time.  I updated patient regarding the results and plan for admission.  Patient agreeable to plan and all questions answered.              Scribe Attestation:   Scribe #1: I performed the above scribed service and the documentation accurately describes the services I performed. I attest to the accuracy of the note.    Attending Attestation:           Physician Attestation for Scribe:  Physician Attestation Statement for Scribe #1: I, Dr. Israel, reviewed documentation, as scribed by Sabina Lorenz in my presence, and it is both accurate and complete.                 ED Course     Clinical Impression:     1. Generalized abdominal pain    2. Chest pain                                 Aibola Israel MD  07/26/17 [id number]

## 2017-07-26 NOTE — ED NOTES
Pt encouraged to give urine sample. Pt states unable to urinate at the time, MD aware. MD requesting RN administer another liter of fluids.

## 2017-07-27 PROBLEM — R11.2 NAUSEA VOMITING AND DIARRHEA: Status: ACTIVE | Noted: 2017-07-27

## 2017-07-27 PROBLEM — R19.7 NAUSEA VOMITING AND DIARRHEA: Status: ACTIVE | Noted: 2017-07-27

## 2017-07-27 LAB
ALBUMIN SERPL BCP-MCNC: 3.4 G/DL
ALP SERPL-CCNC: 77 U/L
ALT SERPL W/O P-5'-P-CCNC: 84 U/L
ANION GAP SERPL CALC-SCNC: 11 MMOL/L
AST SERPL-CCNC: 87 U/L
BASOPHILS # BLD AUTO: 0.01 K/UL
BASOPHILS NFR BLD: 0.2 %
BILIRUB SERPL-MCNC: 0.8 MG/DL
BUN SERPL-MCNC: 10 MG/DL
C DIFF GDH STL QL: NEGATIVE
C DIFF TOX A+B STL QL IA: NEGATIVE
CALCIUM SERPL-MCNC: 8.7 MG/DL
CHLORIDE SERPL-SCNC: 104 MMOL/L
CO2 SERPL-SCNC: 21 MMOL/L
CREAT SERPL-MCNC: 0.7 MG/DL
DIFFERENTIAL METHOD: ABNORMAL
EOSINOPHIL # BLD AUTO: 0.1 K/UL
EOSINOPHIL NFR BLD: 1 %
ERYTHROCYTE [DISTWIDTH] IN BLOOD BY AUTOMATED COUNT: 13.7 %
EST. GFR  (AFRICAN AMERICAN): >60 ML/MIN/1.73 M^2
EST. GFR  (NON AFRICAN AMERICAN): >60 ML/MIN/1.73 M^2
GLUCOSE SERPL-MCNC: 79 MG/DL
HAV IGM SERPL QL IA: NEGATIVE
HBV CORE IGM SERPL QL IA: POSITIVE
HBV SURFACE AG SERPL QL IA: NEGATIVE
HCT VFR BLD AUTO: 36.1 %
HCV AB SERPL QL IA: NEGATIVE
HGB BLD-MCNC: 12.1 G/DL
LYMPHOCYTES # BLD AUTO: 1.9 K/UL
LYMPHOCYTES NFR BLD: 39.7 %
MCH RBC QN AUTO: 32.6 PG
MCHC RBC AUTO-ENTMCNC: 33.5 G/DL
MCV RBC AUTO: 97 FL
MONOCYTES # BLD AUTO: 0.6 K/UL
MONOCYTES NFR BLD: 12.7 %
NEUTROPHILS # BLD AUTO: 2.2 K/UL
NEUTROPHILS NFR BLD: 46.4 %
PLATELET # BLD AUTO: 209 K/UL
PMV BLD AUTO: 10.1 FL
POTASSIUM SERPL-SCNC: 3.4 MMOL/L
PROT SERPL-MCNC: 6.3 G/DL
RBC # BLD AUTO: 3.71 M/UL
SODIUM SERPL-SCNC: 136 MMOL/L
WBC # BLD AUTO: 4.79 K/UL

## 2017-07-27 PROCEDURE — 99219 PR INITIAL OBSERVATION CARE,LEVL II: CPT | Mod: ,,, | Performed by: INTERNAL MEDICINE

## 2017-07-27 PROCEDURE — 85025 COMPLETE CBC W/AUTO DIFF WBC: CPT

## 2017-07-27 PROCEDURE — 99220 PR INITIAL OBSERVATION CARE,LEVL III: CPT | Mod: ,,, | Performed by: PHYSICIAN ASSISTANT

## 2017-07-27 PROCEDURE — 80053 COMPREHEN METABOLIC PANEL: CPT

## 2017-07-27 PROCEDURE — 25000003 PHARM REV CODE 250: Performed by: INTERNAL MEDICINE

## 2017-07-27 PROCEDURE — 25000003 PHARM REV CODE 250: Performed by: PHYSICIAN ASSISTANT

## 2017-07-27 PROCEDURE — 36415 COLL VENOUS BLD VENIPUNCTURE: CPT

## 2017-07-27 PROCEDURE — G0378 HOSPITAL OBSERVATION PER HR: HCPCS

## 2017-07-27 PROCEDURE — 63600175 PHARM REV CODE 636 W HCPCS: Performed by: INTERNAL MEDICINE

## 2017-07-27 PROCEDURE — S4991 NICOTINE PATCH NONLEGEND: HCPCS | Performed by: INTERNAL MEDICINE

## 2017-07-27 RX ORDER — ONDANSETRON 8 MG/1
8 TABLET, ORALLY DISINTEGRATING ORAL EVERY 6 HOURS PRN
Status: DISCONTINUED | OUTPATIENT
Start: 2017-07-27 | End: 2017-07-28 | Stop reason: HOSPADM

## 2017-07-27 RX ORDER — AMLODIPINE BESYLATE 5 MG/1
5 TABLET ORAL DAILY
Status: DISCONTINUED | OUTPATIENT
Start: 2017-07-27 | End: 2017-07-28 | Stop reason: HOSPADM

## 2017-07-27 RX ORDER — POLYETHYLENE GLYCOL 3350, SODIUM SULFATE ANHYDROUS, SODIUM BICARBONATE, SODIUM CHLORIDE, POTASSIUM CHLORIDE 236; 22.74; 6.74; 5.86; 2.97 G/4L; G/4L; G/4L; G/4L; G/4L
4000 POWDER, FOR SOLUTION ORAL ONCE
Status: COMPLETED | OUTPATIENT
Start: 2017-07-27 | End: 2017-07-27

## 2017-07-27 RX ORDER — LORAZEPAM 1 MG/1
2 TABLET ORAL NIGHTLY PRN
Status: DISCONTINUED | OUTPATIENT
Start: 2017-07-27 | End: 2017-07-28 | Stop reason: HOSPADM

## 2017-07-27 RX ADMIN — KETOROLAC TROMETHAMINE 15 MG: 30 INJECTION, SOLUTION INTRAMUSCULAR at 08:07

## 2017-07-27 RX ADMIN — SODIUM CHLORIDE AND POTASSIUM CHLORIDE: 9; 1.49 INJECTION, SOLUTION INTRAVENOUS at 10:07

## 2017-07-27 RX ADMIN — LEVETIRACETAM 500 MG: 500 TABLET ORAL at 08:07

## 2017-07-27 RX ADMIN — QUETIAPINE FUMARATE 25 MG: 25 TABLET, FILM COATED ORAL at 09:07

## 2017-07-27 RX ADMIN — KETOROLAC TROMETHAMINE 15 MG: 30 INJECTION, SOLUTION INTRAMUSCULAR at 02:07

## 2017-07-27 RX ADMIN — POLYETHYLENE GLYCOL 3350, SODIUM SULFATE ANHYDROUS, SODIUM BICARBONATE, SODIUM CHLORIDE, POTASSIUM CHLORIDE 4000 ML: 236; 22.74; 6.74; 5.86; 2.97 POWDER, FOR SOLUTION ORAL at 06:07

## 2017-07-27 RX ADMIN — BUSPIRONE HYDROCHLORIDE 15 MG: 5 TABLET ORAL at 02:07

## 2017-07-27 RX ADMIN — DESVENLAFAXINE SUCCINATE 100 MG: 50 TABLET, EXTENDED RELEASE ORAL at 08:07

## 2017-07-27 RX ADMIN — METOPROLOL SUCCINATE 50 MG: 50 TABLET, EXTENDED RELEASE ORAL at 08:07

## 2017-07-27 RX ADMIN — LORAZEPAM 2 MG: 1 TABLET ORAL at 08:07

## 2017-07-27 RX ADMIN — STANDARDIZED SENNA CONCENTRATE AND DOCUSATE SODIUM 1 TABLET: 8.6; 5 TABLET, FILM COATED ORAL at 09:07

## 2017-07-27 RX ADMIN — DICYCLOMINE HYDROCHLORIDE 20 MG: 10 CAPSULE ORAL at 09:07

## 2017-07-27 RX ADMIN — AMLODIPINE BESYLATE 5 MG: 5 TABLET ORAL at 02:07

## 2017-07-27 RX ADMIN — SODIUM CHLORIDE AND POTASSIUM CHLORIDE: 9; 1.49 INJECTION, SOLUTION INTRAVENOUS at 02:07

## 2017-07-27 RX ADMIN — BUSPIRONE HYDROCHLORIDE 15 MG: 5 TABLET ORAL at 05:07

## 2017-07-27 RX ADMIN — PANTOPRAZOLE SODIUM 40 MG: 40 TABLET, DELAYED RELEASE ORAL at 08:07

## 2017-07-27 RX ADMIN — ONDANSETRON 8 MG: 8 TABLET, ORALLY DISINTEGRATING ORAL at 04:07

## 2017-07-27 RX ADMIN — DICYCLOMINE HYDROCHLORIDE 20 MG: 10 CAPSULE ORAL at 08:07

## 2017-07-27 RX ADMIN — ONDANSETRON 4 MG: 4 TABLET, ORALLY DISINTEGRATING ORAL at 06:07

## 2017-07-27 RX ADMIN — ENOXAPARIN SODIUM 40 MG: 100 INJECTION SUBCUTANEOUS at 04:07

## 2017-07-27 RX ADMIN — NICOTINE 1 PATCH: 21 PATCH, EXTENDED RELEASE TRANSDERMAL at 08:07

## 2017-07-27 RX ADMIN — BUSPIRONE HYDROCHLORIDE 15 MG: 5 TABLET ORAL at 10:07

## 2017-07-27 RX ADMIN — ONDANSETRON 8 MG: 8 TABLET, ORALLY DISINTEGRATING ORAL at 10:07

## 2017-07-27 NOTE — ED NOTES
Pt rounding complete.  Pt resting on stretcher with eyes closed, respirations even and unlabored, in no acute distress.  Call light within reach.  Will continue to monitor.

## 2017-07-27 NOTE — ASSESSMENT & PLAN NOTE
- etiology unclear   - epigastric & RUQ   - less now that vomiting controlled   - EGD & Colonoscopy tomorrow  - GI following

## 2017-07-27 NOTE — PLAN OF CARE
07/27/17 1300   Discharge Assessment   Assessment Type Discharge Planning Assessment   Confirmed/corrected address and phone number on facesheet? Yes   Assessment information obtained from? Patient;Caregiver;Medical Record   Prior to hospitilization cognitive status: Alert/Oriented   Prior to hospitalization functional status: Independent   Current cognitive status: Alert/Oriented   Current Functional Status: Independent   Lives With spouse   Able to Return to Prior Arrangements yes   Is patient able to care for self after discharge? Yes   How many people do you have in your home that can help with your care after discharge? 1   Patient currently being followed by outpatient case management? Unable to determine (comments)   Equipment Currently Used at Home none   Do you have any problems affording any of your prescribed medications? No   Do you have any financial concerns preventing you from receiving the healthcare you need? No   On Dialysis? No   Discharge Plan A Home with family   Patient/Family In Agreement With Plan yes

## 2017-07-27 NOTE — HOSPITAL COURSE
The patient was admitted for workup of acute on chronic abdominal pain with N/V/D. She was seen by GI, and will have EGD and colonoscopy tomorrow. C. Diff studies are pending.

## 2017-07-27 NOTE — PLAN OF CARE
07/27/17 0911   ED Admissions Case Approval   ED Admissions Case Approval (!) CM Approved  (OBS )

## 2017-07-27 NOTE — SUBJECTIVE & OBJECTIVE
Past Medical History:   Diagnosis Date    Anemia     Anemia     Depression     Fatty liver     Hyperlipidemia     Hypertension     Polysubstance abuse     Posterior reversible encephalopathy syndrome     Sarcoidosis of lung     Sarcoidosis of lung     over 30 yrs ago    Seizures     7/2017    Suicide attempt     Suicide ideation        Past Surgical History:   Procedure Laterality Date    APPENDECTOMY      ESOPHAGOGASTRODUODENOSCOPY  10/7/2016, 11/6/2014    2016 - gastritis, duodenitis, 2014 erosive gastritis    FLEXIBLE SIGMOIDOSCOPY  11/06/2014    colitis    HYSTERECTOMY      mediastenoscopy      TONSILLECTOMY N/A 1970    TUBAL LIGATION         Review of patient's allergies indicates:   Allergen Reactions    Fentanyl Other (See Comments)     Other reaction(s): Itching    Lortab  [hydrocodone-acetaminophen] Other (See Comments)    Phenothiazines        No current facility-administered medications on file prior to encounter.      Current Outpatient Prescriptions on File Prior to Encounter   Medication Sig    baclofen (LIORESAL) 10 MG tablet Take 10 mg by mouth 3 (three) times daily as needed (for spasms).     busPIRone (BUSPAR) 15 MG tablet Take 1 tablet by mouth 3 (three) times daily.     desvenlafaxine (PRISTIQ) 100 MG 24 hr tablet Take 1 tablet by mouth once daily.     dicyclomine (BENTYL) 20 mg tablet Take 1 tablet (20 mg total) by mouth 2 (two) times daily.    hydrochlorothiazide (HYDRODIURIL) 25 MG tablet Take 1 tablet (25 mg total) by mouth once daily.    lorazepam (ATIVAN) 2 MG Tab Take 1 tablet (2 mg total) by mouth nightly as needed (insomnia).    metoprolol succinate (TOPROL-XL) 50 MG 24 hr tablet Take 1 tablet (50 mg total) by mouth once daily.    ondansetron (ZOFRAN ODT) 4 MG TbDL Take 1 tablet (4 mg total) by mouth every 8 (eight) hours as needed (nausea).    pantoprazole (PROTONIX) 40 MG tablet Take 40 mg by mouth once daily.    trazodone (DESYREL) 100 MG tablet Take  200 mg by mouth every evening.     vitamin D 1000 units Tab Take 1,000 Units by mouth every evening.      Family History     Problem Relation (Age of Onset)    Breast cancer Maternal Aunt, Daughter    Colon cancer Maternal Uncle        Social History Main Topics    Smoking status: Current Every Day Smoker     Packs/day: 1.00     Years: 30.00     Types: Cigarettes    Smokeless tobacco: Never Used    Alcohol use No    Drug use: No    Sexual activity: Yes     Birth control/ protection: Surgical     Review of Systems   Constitutional: Positive for appetite change (unable to tolerate PO). Negative for activity change, chills, fatigue and fever.   HENT: Negative for sore throat and trouble swallowing.    Respiratory: Negative for cough, chest tightness, shortness of breath and wheezing.    Cardiovascular: Negative for chest pain and palpitations.   Gastrointestinal: Positive for abdominal pain, diarrhea, nausea and vomiting. Negative for abdominal distention, blood in stool and constipation.   Genitourinary: Negative for dysuria, frequency and hematuria.   Skin: Negative for pallor.   Neurological: Positive for light-headedness. Negative for dizziness, syncope, weakness and headaches.     Objective:     Vital Signs (Most Recent):  Temp: 98 °F (36.7 °C) (07/27/17 1100)  Pulse: 65 (07/27/17 1200)  Resp: 18 (07/27/17 1100)  BP: (!) 157/74 (07/27/17 1100)  SpO2: 97 % (07/27/17 1100) Vital Signs (24h Range):  Temp:  [97.8 °F (36.6 °C)-99.8 °F (37.7 °C)] 98 °F (36.7 °C)  Pulse:  [61-81] 65  Resp:  [18-20] 18  SpO2:  [93 %-97 %] 97 %  BP: (120-176)/(57-82) 157/74     Weight: 52.2 kg (115 lb)  Body mass index is 18.56 kg/m².    Physical Exam   Constitutional: She is oriented to person, place, and time. She appears well-developed and well-nourished. No distress.   HENT:   Head: Normocephalic and atraumatic.   Eyes: Conjunctivae and EOM are normal. Pupils are equal, round, and reactive to light. No scleral icterus.   Neck:  Normal range of motion. Neck supple. No tracheal deviation present.   Cardiovascular: Normal rate, regular rhythm, normal heart sounds and intact distal pulses.  Exam reveals no gallop and no friction rub.    No murmur heard.  2+ distal radial/DT/PT pulses.    Pulmonary/Chest: Effort normal and breath sounds normal. No respiratory distress. She has no wheezes. She has no rales.   Abdominal: Soft. Bowel sounds are normal. She exhibits no distension and no mass. There is tenderness (epigastric & RUQ ). There is no rebound and no guarding. No hernia.   Flat abdomen, non-distended, generally hypoactive bowel sounds. No CVA tenderness.     Neurological: She is alert and oriented to person, place, and time.   Skin: Skin is warm and dry. She is not diaphoretic. No pallor.   Psychiatric: She has a normal mood and affect. Her behavior is normal. Judgment and thought content normal.   Nursing note and vitals reviewed.       Significant Labs:   BMP:   Recent Labs  Lab 07/27/17  0424   GLU 79      K 3.4*      CO2 21*   BUN 10   CREATININE 0.7   CALCIUM 8.7     CBC:   Recent Labs  Lab 07/26/17  1526 07/27/17  0424   WBC 9.16 4.79   HGB 14.0 12.1   HCT 40.5 36.1*    209     CMP:   Recent Labs  Lab 07/26/17  1526 07/27/17  0424   * 136   K 3.2* 3.4*   CL 91* 104   CO2 26 21*    79   BUN 12 10   CREATININE 0.9 0.7   CALCIUM 9.7 8.7   PROT 7.9 6.3   ALBUMIN 4.3 3.4*   BILITOT 1.3* 0.8   ALKPHOS 95 77   * 87*   * 84*   ANIONGAP 15 11   EGFRNONAA >60 >60     All pertinent labs within the past 24 hours have been reviewed.    Significant Imaging: I have reviewed all pertinent imaging results/findings within the past 24 hours.

## 2017-07-27 NOTE — CONSULTS
"[facility]  Gastroenterology  Consult Note    Patient Name: Earl Abdul  MRN: [id number]  Admission Date: 7/26/2017  Hospital Length of Stay: 0 days  Code Status: Full Code   Attending Provider: Jimmy Flores MD   Consulting Provider: Aaron Alvarado MD  Primary Care Physician: Dorota Galvin MD  Principal Problem:<principal problem not specified>    Consults  Subjective:     HPI: 56 y/o woman c/o 2 weeks of N/V, diarrhea and abdominal pain.  The pain if from her epigastrum to the suprapubic area. She has several watery BM per day, but there have been some formed BM.  She has hospitalized for similar sx last week, but have gotten worse. No melena, hematochezia or hematemesis. CT at last admit was unremarkable. EGD 9 months ago was ok. Haldol helped some with the nausea last night. She had been on twice daily morphine for an extended period. This was for back pain.  She went through rehab at the beginning of the month to get off of narcotics. She says that the med which helped her the most was given to her in the [facility] and starts "with a D."     Past Medical History:   Diagnosis Date    Anemia     Anemia     Depression     Fatty liver     Hyperlipidemia     Hypertension     Polysubstance abuse     Posterior reversible encephalopathy syndrome     Sarcoidosis of lung     Sarcoidosis of lung     over 30 yrs ago    Seizures     7/2017    Suicide attempt     Suicide ideation        Past Surgical History:   Procedure Laterality Date    APPENDECTOMY      ESOPHAGOGASTRODUODENOSCOPY  10/7/2016, 11/6/2014    2016 - gastritis, duodenitis, 2014 erosive gastritis    FLEXIBLE SIGMOIDOSCOPY  11/06/2014    colitis    HYSTERECTOMY      mediastenoscopy      TONSILLECTOMY N/A 1970    TUBAL LIGATION         Review of patient's allergies indicates:   Allergen Reactions    Fentanyl Other (See Comments)     Other reaction(s): Itching    Lortab  [hydrocodone-acetaminophen] Other (See " Comments)    Phenothiazines      Family History     Problem Relation (Age of Onset)    Breast cancer Maternal Aunt, Daughter    Colon cancer Maternal Uncle        Social History Main Topics    Smoking status: Current Every Day Smoker     Packs/day: 1.00     Years: 30.00     Types: Cigarettes    Smokeless tobacco: Never Used    Alcohol use No    Drug use: No    Sexual activity: Yes     Birth control/ protection: Surgical     Review of Systems   Constitutional: Positive for unexpected weight change. Negative for chills and fever.   HENT: Negative for trouble swallowing.    Respiratory: Negative for cough, chest tightness and shortness of breath.    Cardiovascular: Negative for chest pain.   Gastrointestinal: Positive for abdominal pain, diarrhea, nausea and vomiting. Negative for abdominal distention, anal bleeding, blood in stool and constipation.   Genitourinary: Negative for dysuria.     Objective:     Vital Signs (Most Recent):  Temp: 98.6 °F (37 °C) (07/27/17 0715)  Pulse: 81 (07/27/17 0800)  Resp: 18 (07/27/17 0715)  BP: (!) 167/77 (07/27/17 0715)  SpO2: (!) 94 % (07/27/17 0715) Vital Signs (24h Range):  Temp:  [97.8 °F (36.6 °C)-99.8 °F (37.7 °C)] 98.6 °F (37 °C)  Pulse:  [62-81] 81  Resp:  [16-20] 18  SpO2:  [93 %-97 %] 94 %  BP: (120-176)/(57-82) 167/77     Weight: 52.2 kg (115 lb) (07/26/17 1241)  Body mass index is 18.56 kg/m².    No intake or output data in the 24 hours ending 07/27/17 0932    Lines/Drains/Airways     Peripheral Intravenous Line                 Peripheral IV - Single Lumen 07/26/17 1526 Left;Anterior Forearm less than 1 day                Physical Exam   Constitutional: She appears well-developed and well-nourished.   Eyes: No scleral icterus.   Cardiovascular: Normal rate, regular rhythm and normal heart sounds.    Pulmonary/Chest: Effort normal and breath sounds normal.   Abdominal: Soft. She exhibits no distension. There is no rebound and no guarding.   Mild epigastric tenderness    Psychiatric:   anxious       Significant Labs:  CBC:   Recent Labs  Lab 07/26/17  1526 07/27/17  0424   WBC 9.16 4.79   HGB 14.0 12.1   HCT 40.5 36.1*    209     CMP:   Recent Labs  Lab 07/27/17  0424   GLU 79   CALCIUM 8.7   ALBUMIN 3.4*   PROT 6.3      K 3.4*   CO2 21*      BUN 10   CREATININE 0.7   ALKPHOS 77   ALT 84*   AST 87*   BILITOT 0.8       Significant Imaging:  Imaging results within the past 24 hours have been reviewed.    Assessment/Plan:     Active Diagnoses:    Diagnosis Date Noted POA    Chest pain [R07.9] 07/26/2017 Yes      Problems Resolved During this Admission:    Diagnosis Date Noted Date Resolved POA       Abdominal pain, nausea, vomiting, diarrhea. Etiology unclear. Awaiting C diff. Viral gastroenteritis can be considered, but would not expect sx to last so long. Another possibility is functional symptoms exacerbated by no longer being on opioids.    Plan EGD/Colonoscopy  Increase zofran dose  Consider small dose of haldol for nausea    Discussed with Sandra Molina.        Thank you for your consult.     Aaron Alvarado MD  Gastroenterology  [facility]

## 2017-07-27 NOTE — NURSING
Patient c/o 3-4 episodes of diarrhea since arrival to floor.  Dr. Turner notified. C Diff panel with isolation precautions and Imodium X1 ordered.  Encouraged  to wear PPE while visiting with patient.  All questions answered.  Will continue to monitor.

## 2017-07-27 NOTE — HPI
Ms. Earl Abdul is a 57 y.o. female, with PMH of HTN, HLD, polysubstance abuse (alcohol), opioid use (recently detoxed off of morphine), PRES, depression, who presents with worsening of chronic abdominal pain x 2-3 weeks. She states she was proceeded by non-bloody, non-bilious vomiting and diarrhea. She states the vomiting has ceased, but the diarrhea continues. She describes the diarrhea as occurring every 5-10 minutes, and being yellow and thin without odor. She states she has been unable to keep any foods down. She follows up with Dr. Keith, and has previously had endoscopy showing inflammation only 1 year ago, and a colonoscopy 3 years ago. Additionally, she was recently treated for posterior reversible encephalopathy syndrome, for which she was started on Keppra. She denies fever, chills, sweats, recent travel, consumption of new/raw/undercooked foods, antibiotic use. She states she has had C. Diff in the past, and symptoms this time are much different.

## 2017-07-27 NOTE — ASSESSMENT & PLAN NOTE
- epigastric & radiates up mid sternum, burning in nature  - worse immediately after vomiting   - improved now that she is not vomiting   - likely 2/2 vomiting   - PRN nausea meds to treat

## 2017-07-27 NOTE — H&P
[facility] Hospital Medicine  History & Physical    Patient Name: Earl Abdul  MRN: [id number]  Admission Date: 7/26/2017  Attending Physician: Jimmy Flores MD   Primary Care Provider: Dorota Galvin MD         Patient information was obtained from patient, past medical records and ER records.     Subjective:     Principal Problem:<principal problem not specified>    Chief Complaint:   Chief Complaint   Patient presents with    Abdominal Pain     chronic abd pain, sent by Eldon Keith        HPI: Ms. Earl Abdul is a 57 y.o. female, with PMH of HTN, HLD, polysubstance abuse (alcohol), opioid use (recently detoxed off of morphine), PRES, depression, who presents with worsening of chronic abdominal pain x 2-3 weeks. She states she was proceeded by non-bloody, non-bilious vomiting and diarrhea. She states the vomiting has ceased, but the diarrhea continues. She describes the diarrhea as occurring every 5-10 minutes, and being yellow and thin without odor. She states she has been unable to keep any foods down. She follows up with Dr. Keith, and has previously had endoscopy showing inflammation only 1 year ago, and a colonoscopy 3 years ago. Additionally, she was recently treated for posterior reversible encephalopathy syndrome, for which she was started on Keppra. She denies fever, chills, sweats, recent travel, consumption of new/raw/undercooked foods, antibiotic use. She states she has had C. Diff in the past, and symptoms this time are much different.     Past Medical History:   Diagnosis Date    Anemia     Anemia     Depression     Fatty liver     Hyperlipidemia     Hypertension     Polysubstance abuse     Posterior reversible encephalopathy syndrome     Sarcoidosis of lung     Sarcoidosis of lung     over 30 yrs ago    Seizures     7/2017    Suicide attempt     Suicide ideation        Past Surgical History:   Procedure Laterality Date    APPENDECTOMY       ESOPHAGOGASTRODUODENOSCOPY  10/7/2016, 11/6/2014    2016 - gastritis, duodenitis, 2014 erosive gastritis    FLEXIBLE SIGMOIDOSCOPY  11/06/2014    colitis    HYSTERECTOMY      mediastenoscopy      TONSILLECTOMY N/A 1970    TUBAL LIGATION         Review of patient's allergies indicates:   Allergen Reactions    Fentanyl Other (See Comments)     Other reaction(s): Itching    Lortab  [hydrocodone-acetaminophen] Other (See Comments)    Phenothiazines        No current facility-administered medications on file prior to encounter.      Current Outpatient Prescriptions on File Prior to Encounter   Medication Sig    baclofen (LIORESAL) 10 MG tablet Take 10 mg by mouth 3 (three) times daily as needed (for spasms).     busPIRone (BUSPAR) 15 MG tablet Take 1 tablet by mouth 3 (three) times daily.     desvenlafaxine (PRISTIQ) 100 MG 24 hr tablet Take 1 tablet by mouth once daily.     dicyclomine (BENTYL) 20 mg tablet Take 1 tablet (20 mg total) by mouth 2 (two) times daily.    hydrochlorothiazide (HYDRODIURIL) 25 MG tablet Take 1 tablet (25 mg total) by mouth once daily.    lorazepam (ATIVAN) 2 MG Tab Take 1 tablet (2 mg total) by mouth nightly as needed (insomnia).    metoprolol succinate (TOPROL-XL) 50 MG 24 hr tablet Take 1 tablet (50 mg total) by mouth once daily.    ondansetron (ZOFRAN ODT) 4 MG TbDL Take 1 tablet (4 mg total) by mouth every 8 (eight) hours as needed (nausea).    pantoprazole (PROTONIX) 40 MG tablet Take 40 mg by mouth once daily.    trazodone (DESYREL) 100 MG tablet Take 200 mg by mouth every evening.     vitamin D 1000 units Tab Take 1,000 Units by mouth every evening.      Family History     Problem Relation (Age of Onset)    Breast cancer Maternal Aunt, Daughter    Colon cancer Maternal Uncle        Social History Main Topics    Smoking status: Current Every Day Smoker     Packs/day: 1.00     Years: 30.00     Types: Cigarettes    Smokeless tobacco: Never Used    Alcohol use No     Drug use: No    Sexual activity: Yes     Birth control/ protection: Surgical     Review of Systems   Constitutional: Positive for appetite change (unable to tolerate PO). Negative for activity change, chills, fatigue and fever.   HENT: Negative for sore throat and trouble swallowing.    Respiratory: Negative for cough, chest tightness, shortness of breath and wheezing.    Cardiovascular: Negative for chest pain and palpitations.   Gastrointestinal: Positive for abdominal pain, diarrhea, nausea and vomiting. Negative for abdominal distention, blood in stool and constipation.   Genitourinary: Negative for dysuria, frequency and hematuria.   Skin: Negative for pallor.   Neurological: Positive for light-headedness. Negative for dizziness, syncope, weakness and headaches.     Objective:     Vital Signs (Most Recent):  Temp: 98 °F (36.7 °C) (07/27/17 1100)  Pulse: 65 (07/27/17 1200)  Resp: 18 (07/27/17 1100)  BP: (!) 157/74 (07/27/17 1100)  SpO2: 97 % (07/27/17 1100) Vital Signs (24h Range):  Temp:  [97.8 °F (36.6 °C)-99.8 °F (37.7 °C)] 98 °F (36.7 °C)  Pulse:  [61-81] 65  Resp:  [18-20] 18  SpO2:  [93 %-97 %] 97 %  BP: (120-176)/(57-82) 157/74     Weight: 52.2 kg (115 lb)  Body mass index is 18.56 kg/m².    Physical Exam   Constitutional: She is oriented to person, place, and time. She appears well-developed and well-nourished. No distress.   HENT:   Head: Normocephalic and atraumatic.   Eyes: Conjunctivae and EOM are normal. Pupils are equal, round, and reactive to light. No scleral icterus.   Neck: Normal range of motion. Neck supple. No tracheal deviation present.   Cardiovascular: Normal rate, regular rhythm, normal heart sounds and intact distal pulses.  Exam reveals no gallop and no friction rub.    No murmur heard.  2+ distal radial/DT/PT pulses.    Pulmonary/Chest: Effort normal and breath sounds normal. No respiratory distress. She has no wheezes. She has no rales.   Abdominal: Soft. Bowel sounds are normal.  She exhibits no distension and no mass. There is tenderness (epigastric & RUQ ). There is no rebound and no guarding. No hernia.   Flat abdomen, non-distended, generally hypoactive bowel sounds. No CVA tenderness.     Neurological: She is alert and oriented to person, place, and time.   Skin: Skin is warm and dry. She is not diaphoretic. No pallor.   Psychiatric: She has a normal mood and affect. Her behavior is normal. Judgment and thought content normal.   Nursing note and vitals reviewed.       Significant Labs:   BMP:   Recent Labs  Lab 07/27/17  0424   GLU 79      K 3.4*      CO2 21*   BUN 10   CREATININE 0.7   CALCIUM 8.7     CBC:   Recent Labs  Lab 07/26/17  1526 07/27/17  0424   WBC 9.16 4.79   HGB 14.0 12.1   HCT 40.5 36.1*    209     CMP:   Recent Labs  Lab 07/26/17  1526 07/27/17  0424   * 136   K 3.2* 3.4*   CL 91* 104   CO2 26 21*    79   BUN 12 10   CREATININE 0.9 0.7   CALCIUM 9.7 8.7   PROT 7.9 6.3   ALBUMIN 4.3 3.4*   BILITOT 1.3* 0.8   ALKPHOS 95 77   * 87*   * 84*   ANIONGAP 15 11   EGFRNONAA >60 >60     All pertinent labs within the past 24 hours have been reviewed.    Significant Imaging: I have reviewed all pertinent imaging results/findings within the past 24 hours.    Assessment/Plan:     Abdominal pain    - etiology unclear   - epigastric & RUQ   - less now that vomiting controlled   - EGD & Colonoscopy tomorrow  - GI following          Nausea vomiting and diarrhea    - PRN symptomatic treatments   - C. Diff pending          Chest pain    - epigastric & radiates up mid sternum, burning in nature  - worse immediately after vomiting   - improved now that she is not vomiting   - likely 2/2 vomiting   - PRN nausea meds to treat           PRES (posterior reversible encephalopathy syndrome)    - continue Keppra           HTN (hypertension)    - Home meds:    - amlodipine (NORVASC) 5 MG tablet QD    - hydrochlorothiazide (HYDRODIURIL) 25 MG tablet QD     - metoprolol succinate (TOPROL-XL) 50 MG 24 hr tablet QD   - hold HCTZ 2/2 fluid losses from V/D            VTE Risk Mitigation         Ordered     Medium Risk of VTE  Once      07/26/17 1941     enoxaparin injection 40 mg  Daily     Route:  Subcutaneous        07/26/17 1816        Shu Harmon PA-C  Department of Hospital Medicine   [facility]

## 2017-07-27 NOTE — NURSING
Received patient from ED via stretcher.  NDN, VSS.  IV fluids running.  Will continue to monitor.

## 2017-07-27 NOTE — ASSESSMENT & PLAN NOTE
- Home meds:    - amlodipine (NORVASC) 5 MG tablet QD    - hydrochlorothiazide (HYDRODIURIL) 25 MG tablet QD    - metoprolol succinate (TOPROL-XL) 50 MG 24 hr tablet QD   - hold HCTZ 2/2 fluid losses from V/D

## 2017-07-28 ENCOUNTER — ANESTHESIA (OUTPATIENT)
Dept: ENDOSCOPY | Facility: OTHER | Age: 57
End: 2017-07-28
Payer: COMMERCIAL

## 2017-07-28 ENCOUNTER — ANESTHESIA EVENT (OUTPATIENT)
Dept: ENDOSCOPY | Facility: OTHER | Age: 57
End: 2017-07-28
Payer: COMMERCIAL

## 2017-07-28 ENCOUNTER — SURGERY (OUTPATIENT)
Age: 57
End: 2017-07-28

## 2017-07-28 VITALS
HEIGHT: 66 IN | BODY MASS INDEX: 18.48 KG/M2 | HEART RATE: 79 BPM | TEMPERATURE: 98 F | RESPIRATION RATE: 18 BRPM | WEIGHT: 115 LBS | OXYGEN SATURATION: 100 % | SYSTOLIC BLOOD PRESSURE: 167 MMHG | DIASTOLIC BLOOD PRESSURE: 76 MMHG

## 2017-07-28 PROBLEM — R10.84 GENERALIZED ABDOMINAL PAIN: Status: ACTIVE | Noted: 2017-07-28

## 2017-07-28 LAB
ALBUMIN SERPL BCP-MCNC: 3.3 G/DL
ALP SERPL-CCNC: 70 U/L
ALT SERPL W/O P-5'-P-CCNC: 125 U/L
ANION GAP SERPL CALC-SCNC: 9 MMOL/L
AST SERPL-CCNC: 174 U/L
BASOPHILS # BLD AUTO: 0.01 K/UL
BASOPHILS NFR BLD: 0.3 %
BILIRUB SERPL-MCNC: 0.5 MG/DL
BUN SERPL-MCNC: 7 MG/DL
CALCIUM SERPL-MCNC: 8.7 MG/DL
CHLORIDE SERPL-SCNC: 112 MMOL/L
CO2 SERPL-SCNC: 15 MMOL/L
CREAT SERPL-MCNC: 0.6 MG/DL
DIFFERENTIAL METHOD: ABNORMAL
EOSINOPHIL # BLD AUTO: 0.1 K/UL
EOSINOPHIL NFR BLD: 2 %
ERYTHROCYTE [DISTWIDTH] IN BLOOD BY AUTOMATED COUNT: 13.6 %
EST. GFR  (AFRICAN AMERICAN): >60 ML/MIN/1.73 M^2
EST. GFR  (NON AFRICAN AMERICAN): >60 ML/MIN/1.73 M^2
GLUCOSE SERPL-MCNC: 71 MG/DL
HBV SURFACE AG SERPL QL IA: NEGATIVE
HCT VFR BLD AUTO: 33.1 %
HGB BLD-MCNC: 11 G/DL
LYMPHOCYTES # BLD AUTO: 1.4 K/UL
LYMPHOCYTES NFR BLD: 40.9 %
MCH RBC QN AUTO: 32.4 PG
MCHC RBC AUTO-ENTMCNC: 33.2 G/DL
MCV RBC AUTO: 97 FL
MONOCYTES # BLD AUTO: 0.5 K/UL
MONOCYTES NFR BLD: 14.5 %
NEUTROPHILS # BLD AUTO: 1.5 K/UL
NEUTROPHILS NFR BLD: 42 %
PLATELET # BLD AUTO: 168 K/UL
PMV BLD AUTO: 9.5 FL
POTASSIUM SERPL-SCNC: 4.3 MMOL/L
PROT SERPL-MCNC: 6.1 G/DL
RBC # BLD AUTO: 3.4 M/UL
SODIUM SERPL-SCNC: 136 MMOL/L
WBC # BLD AUTO: 3.45 K/UL

## 2017-07-28 PROCEDURE — 36415 COLL VENOUS BLD VENIPUNCTURE: CPT

## 2017-07-28 PROCEDURE — 27201012 HC FORCEPS, HOT/COLD, DISP: Performed by: INTERNAL MEDICINE

## 2017-07-28 PROCEDURE — 87517 HEPATITIS B DNA QUANT: CPT

## 2017-07-28 PROCEDURE — G0378 HOSPITAL OBSERVATION PER HR: HCPCS

## 2017-07-28 PROCEDURE — 25000003 PHARM REV CODE 250: Performed by: INTERNAL MEDICINE

## 2017-07-28 PROCEDURE — 80053 COMPREHEN METABOLIC PANEL: CPT

## 2017-07-28 PROCEDURE — 25000003 PHARM REV CODE 250: Performed by: NURSE ANESTHETIST, CERTIFIED REGISTERED

## 2017-07-28 PROCEDURE — 37000008 HC ANESTHESIA 1ST 15 MINUTES: Performed by: INTERNAL MEDICINE

## 2017-07-28 PROCEDURE — 63600175 PHARM REV CODE 636 W HCPCS: Performed by: INTERNAL MEDICINE

## 2017-07-28 PROCEDURE — 85025 COMPLETE CBC W/AUTO DIFF WBC: CPT

## 2017-07-28 PROCEDURE — 88342 IMHCHEM/IMCYTCHM 1ST ANTB: CPT | Mod: 26,,, | Performed by: PATHOLOGY

## 2017-07-28 PROCEDURE — 43235 EGD DIAGNOSTIC BRUSH WASH: CPT | Performed by: INTERNAL MEDICINE

## 2017-07-28 PROCEDURE — 88305 TISSUE EXAM BY PATHOLOGIST: CPT | Performed by: PATHOLOGY

## 2017-07-28 PROCEDURE — 88305 TISSUE EXAM BY PATHOLOGIST: CPT | Mod: 26,,, | Performed by: PATHOLOGY

## 2017-07-28 PROCEDURE — 87340 HEPATITIS B SURFACE AG IA: CPT

## 2017-07-28 PROCEDURE — 99217 PR OBSERVATION CARE DISCHARGE: CPT | Mod: ,,, | Performed by: INTERNAL MEDICINE

## 2017-07-28 PROCEDURE — 63600175 PHARM REV CODE 636 W HCPCS: Performed by: NURSE ANESTHETIST, CERTIFIED REGISTERED

## 2017-07-28 PROCEDURE — 43239 EGD BIOPSY SINGLE/MULTIPLE: CPT | Performed by: INTERNAL MEDICINE

## 2017-07-28 PROCEDURE — 37000009 HC ANESTHESIA EA ADD 15 MINS: Performed by: INTERNAL MEDICINE

## 2017-07-28 PROCEDURE — 45380 COLONOSCOPY AND BIOPSY: CPT | Performed by: INTERNAL MEDICINE

## 2017-07-28 RX ORDER — HYDROMORPHONE HYDROCHLORIDE 2 MG/ML
0.4 INJECTION, SOLUTION INTRAMUSCULAR; INTRAVENOUS; SUBCUTANEOUS EVERY 5 MIN PRN
Status: DISCONTINUED | OUTPATIENT
Start: 2017-07-28 | End: 2017-07-28 | Stop reason: HOSPADM

## 2017-07-28 RX ORDER — SODIUM CHLORIDE 0.9 % (FLUSH) 0.9 %
3 SYRINGE (ML) INJECTION
Status: DISCONTINUED | OUTPATIENT
Start: 2017-07-28 | End: 2017-07-28 | Stop reason: HOSPADM

## 2017-07-28 RX ORDER — SODIUM CHLORIDE, SODIUM LACTATE, POTASSIUM CHLORIDE, CALCIUM CHLORIDE 600; 310; 30; 20 MG/100ML; MG/100ML; MG/100ML; MG/100ML
INJECTION, SOLUTION INTRAVENOUS CONTINUOUS PRN
Status: DISCONTINUED | OUTPATIENT
Start: 2017-07-28 | End: 2017-07-28

## 2017-07-28 RX ORDER — PROPOFOL 10 MG/ML
VIAL (ML) INTRAVENOUS
Status: DISCONTINUED | OUTPATIENT
Start: 2017-07-28 | End: 2017-07-28

## 2017-07-28 RX ORDER — FENTANYL CITRATE 50 UG/ML
25 INJECTION, SOLUTION INTRAMUSCULAR; INTRAVENOUS EVERY 5 MIN PRN
Status: DISCONTINUED | OUTPATIENT
Start: 2017-07-28 | End: 2017-07-28 | Stop reason: HOSPADM

## 2017-07-28 RX ORDER — OXYCODONE HYDROCHLORIDE 5 MG/1
5 TABLET ORAL
Status: DISCONTINUED | OUTPATIENT
Start: 2017-07-28 | End: 2017-07-28 | Stop reason: HOSPADM

## 2017-07-28 RX ORDER — MEPERIDINE HYDROCHLORIDE 50 MG/ML
12.5 INJECTION INTRAMUSCULAR; INTRAVENOUS; SUBCUTANEOUS ONCE AS NEEDED
Status: DISCONTINUED | OUTPATIENT
Start: 2017-07-28 | End: 2017-07-28 | Stop reason: HOSPADM

## 2017-07-28 RX ADMIN — PROPOFOL 50 MG: 10 INJECTION, EMULSION INTRAVENOUS at 07:07

## 2017-07-28 RX ADMIN — KETOROLAC TROMETHAMINE 15 MG: 30 INJECTION, SOLUTION INTRAMUSCULAR at 04:07

## 2017-07-28 RX ADMIN — ONDANSETRON 8 MG: 8 TABLET, ORALLY DISINTEGRATING ORAL at 08:07

## 2017-07-28 RX ADMIN — ONDANSETRON 8 MG: 8 TABLET, ORALLY DISINTEGRATING ORAL at 04:07

## 2017-07-28 RX ADMIN — SODIUM CHLORIDE, SODIUM LACTATE, POTASSIUM CHLORIDE, AND CALCIUM CHLORIDE: 600; 310; 30; 20 INJECTION, SOLUTION INTRAVENOUS at 07:07

## 2017-07-28 NOTE — PLAN OF CARE
Problem: Patient Care Overview  Goal: Plan of Care Review  Plan of care reviewed with patient. Patient call light, personal belongings within reach,. Patient complaining of abdominal pain. Treated with PRN pain medication. Pt VSS. Nausea treated with PRN zofran. Patient given EGD prep at 1800. She is aware she is NPO at midnight. Patient encouraged to turn Q2. Purposefully rounding completed. Will continue to monitor.

## 2017-07-28 NOTE — ANESTHESIA POSTPROCEDURE EVALUATION
"Anesthesia Post Evaluation    Patient: Earl Abdul    Procedure(s) Performed: Procedure(s) (LRB):  ESOPHAGOGASTRODUODENOSCOPY (EGD) (N/A)  COLONOSCOPY (N/A)    Final Anesthesia Type: general  Patient location during evaluation: PACU  Patient participation: Yes- Able to Participate  Level of consciousness: awake and alert  Post-procedure vital signs: reviewed and stable  Pain management: adequate  Airway patency: patent  PONV status at discharge: No PONV  Anesthetic complications: no      Cardiovascular status: hemodynamically stable  Respiratory status: unassisted  Hydration status: euvolemic  Follow-up not needed.        Visit Vitals  BP (!) 168/75 (BP Location: Right arm, Patient Position: Lying, BP Method: Automatic)   Pulse 68   Temp 36.9 °C (98.4 °F) (Oral)   Resp 18   Ht 5' 6" (1.676 m)   Wt 52.2 kg (115 lb)   SpO2 100%   Breastfeeding? No   BMI 18.56 kg/m²       Pain/Mary Score: Pain Assessment Performed: Yes (7/27/2017  8:58 PM)  Presence of Pain: denies (7/27/2017  8:58 PM)  Pain Rating Prior to Med Admin: 8 (7/28/2017  4:10 AM)  Mary Score: 9 (7/28/2017  7:45 AM)      "

## 2017-07-28 NOTE — PLAN OF CARE
Attn: team   DC PLANNING     NO NEEDS PER MD TEAM    - DISCHARGE     Shi Romero, CLAUDIA  Case management 7/26/201711:07 AM  # [phone] (FAX) [phone]     07/28/17 1048   Discharge Assessment   Assessment Type Final Discharge Note

## 2017-07-28 NOTE — NURSING
Patient educated on discharge instructions and verbalized understanding.  All questions answered to patient's satisfaction.  IV removed without complication.   at bedside.  Patient to be transported off unit via wheelchair.

## 2017-07-28 NOTE — TRANSFER OF CARE
"Anesthesia Transfer of Care Note    Patient: Earl Abdul    Procedure(s) Performed: Procedure(s) (LRB):  ESOPHAGOGASTRODUODENOSCOPY (EGD) (N/A)  COLONOSCOPY (N/A)    Patient location: PACU    Anesthesia Type: general    Transport from OR: Transported from OR on 2-3 L/min O2 by NC with adequate spontaneous ventilation. Continuous SpO2 monitoring in transport    Post pain: adequate analgesia    Post assessment: no apparent anesthetic complications    Post vital signs: stable    Level of consciousness: awake, alert and oriented    Nausea/Vomiting: no nausea/vomiting    Complications: none    Transfer of care protocol was followed      Last vitals:   Visit Vitals  BP (!) 168/75 (BP Location: Right arm, Patient Position: Lying, BP Method: Automatic)   Pulse 68   Temp 36.9 °C (98.4 °F) (Oral)   Resp 18   Ht 5' 6" (1.676 m)   Wt 52.2 kg (115 lb)   SpO2 100%   Breastfeeding? No   BMI 18.56 kg/m²     "

## 2017-07-28 NOTE — PLAN OF CARE
Problem: Patient Care Overview  Goal: Plan of Care Review  Outcome: Ongoing (interventions implemented as appropriate)  Patient in bed asleep. NAD noted. No s/sx of infection noted. CDiff culture came back negative. Free of fall/injury. Bed in lowest position, call light in reach, side rails upx2. Purposeful hourly rounding performed. Will continue to monitor.

## 2017-07-28 NOTE — DISCHARGE SUMMARY
Discharge Summary  Hospital Medicine      Admit Date: 7/26/2017    Discharge Date and Time: 7/28/2017 8:17 AM    Discharge Attending Physician: Jimmy Flores MD     Diagnoses:  Active Hospital Problems    Diagnosis  POA    *Abdominal pain [R10.9]  Yes    Generalized abdominal pain [R10.84]  Yes    Nausea vomiting and diarrhea [R11.2, R19.7]  Yes    Chest pain [R07.9]  Yes    PRES (posterior reversible encephalopathy syndrome) [I67.83]  Yes    HTN (hypertension) [I10]  Yes      Resolved Hospital Problems    Diagnosis Date Resolved POA   No resolved problems to display.       Discharged Condition: stable    Hospital Course: Ms. Earl Abdul is a 57 y.o. female, with PMH of HTN, HLD, polysubstance abuse (alcohol), opioid use (recently detoxed off of morphine), PRES, depression, who presents with worsening of chronic abdominal pain x 2-3 weeks. She states she was proceeded by non-bloody, non-bilious vomiting and diarrhea. She states the vomiting has ceased, but the diarrhea continues. She describes the diarrhea as occurring every 5-10 minutes, and being yellow and thin without odor. She follows with Dr. Keith. Additionally, she was recently treated for posterior reversible encephalopathy syndrome, for which she was started on Keppra. She denies fever, chills, sweats, recent travel, consumption of new/raw/undercooked foods, antibiotic use.    1: Nausea and diarrhea  - C.diff found negative  - endoscopy per Dr. Alvarado shows small superficial gastric ulcer (unlikely cause of her pain) and few diverticuli in the colon.   - Colonic biopsies are pending.   - the cause of her sx. Was not determined. She tolerated a diet prior to discharge and will follow up with Dr. Keith as planned.     2: Hepatitis B.   - HBV core IgM positive  - had HBs AB, HB e AB positive in 2014, negative HB s AB.   - has mildly elevated transaminases.   - HBV sAb and DNA are pending.   - follow up with Dr. Alvarado.     Consults:  Gastroenterology    Significant Diagnostic Studies:   CBC:   Recent Labs  Lab 07/28/17  0448   WBC 3.45*   RBC 3.40*   HGB 11.0*   HCT 33.1*      MCV 97   MCH 32.4*   MCHC 33.2       CMP:   Recent Labs  Lab 07/28/17  0448   GLU 71   CALCIUM 8.7   ALBUMIN 3.3*   PROT 6.1      K 4.3   CO2 15*   *   BUN 7   CREATININE 0.6   ALKPHOS 70   *   *   BILITOT 0.5       Special Treatments/Procedures: EGD, Colonoscopy    Disposition: Home or Self Care    Diet: as tolerated    Activity: as tolerated    Patient Instructions:   Reconciled Home Medications:   Current Discharge Medication List      CONTINUE these medications which have NOT CHANGED    Details   baclofen (LIORESAL) 10 MG tablet Take 10 mg by mouth 3 (three) times daily as needed (for spasms).   Refills: 0      busPIRone (BUSPAR) 15 MG tablet Take 1 tablet by mouth 3 (three) times daily.       desvenlafaxine (PRISTIQ) 100 MG 24 hr tablet Take 1 tablet by mouth once daily.       dicyclomine (BENTYL) 20 mg tablet Take 1 tablet (20 mg total) by mouth 2 (two) times daily.  Qty: 20 tablet, Refills: 0      hydrochlorothiazide (HYDRODIURIL) 25 MG tablet Take 1 tablet (25 mg total) by mouth once daily.  Qty: 90 tablet, Refills: 0      lorazepam (ATIVAN) 2 MG Tab Take 1 tablet (2 mg total) by mouth nightly as needed (insomnia).  Qty: 7 tablet, Refills: 0      metoprolol succinate (TOPROL-XL) 50 MG 24 hr tablet Take 1 tablet (50 mg total) by mouth once daily.  Qty: 90 tablet, Refills: 0      ondansetron (ZOFRAN ODT) 4 MG TbDL Take 1 tablet (4 mg total) by mouth every 8 (eight) hours as needed (nausea).  Qty: 12 tablet, Refills: 0      pantoprazole (PROTONIX) 40 MG tablet Take 40 mg by mouth once daily.      amlodipine (NORVASC) 5 MG tablet Take 5 mg by mouth once daily.      potassium chloride (KLOR-CON) 10 MEQ TbSR Take 10 mEq by mouth 2 (two) times daily.      quetiapine (SEROQUEL) 25 MG Tab Take 25 mg by mouth once daily.      trazodone  (DESYREL) 100 MG tablet Take 200 mg by mouth every evening.       vitamin D 1000 units Tab Take 1,000 Units by mouth every evening.          STOP taking these medications       morphine (MS CONTIN) 30 MG 12 hr tablet Comments:   Reason for Stopping:                 Discharge Procedure Orders  Diet general   Order Specific Question Answer Comments   Na restriction, if any: 2gNa      Activity as tolerated     Call MD for:  temperature >100.4     Call MD for:  severe uncontrolled pain     Call MD for:  difficulty breathing or increased cough     Call MD for:  severe persistent headache     Call MD for:  increased confusion or weakness     No dressing needed         Follow-up Information     Eldon Keith MD.    Specialty:  Gastroenterology  Contact information:  [address] [address]  [phone]

## 2017-07-28 NOTE — ANESTHESIA PREPROCEDURE EVALUATION
07/28/2017  Earl Abdul is a 57 y.o., female.    Anesthesia Evaluation    I have reviewed the Patient Summary Reports.    I have reviewed the Nursing Notes.   I have reviewed the Medications.     Review of Systems  Anesthesia Hx:  No problems with previous Anesthesia    Social:  Non-Smoker    Cardiovascular:   Hypertension, well controlled    Pulmonary:  Pulmonary Normal    Hepatic/GI:   Liver Disease,    Neurological:   Seizures    Endocrine:  Endocrine Normal    Psych:   Psychiatric History          Physical Exam  General:  Well nourished    Airway/Jaw/Neck:  Airway Findings: Mouth Opening: Normal Tongue: Normal  General Airway Assessment: Adult  Mallampati: II  TM Distance: Normal, at least 6 cm  Jaw/Neck Findings:     Neck ROM: Normal ROM      Dental:  Dental Findings: In tact             Anesthesia Plan  Type of Anesthesia, risks & benefits discussed:  Anesthesia Type:  general  Patient's Preference:   Intra-op Monitoring Plan:   Intra-op Monitoring Plan Comments:   Post Op Pain Control Plan:   Post Op Pain Control Plan Comments:   Induction:   IV  Beta Blocker:         Informed Consent: Patient understands risks and agrees with Anesthesia plan.  Questions answered. Anesthesia consent signed with patient.  ASA Score: 3     Day of Surgery Review of History & Physical:    H&P update referred to the surgeon.         Ready For Surgery From Anesthesia Perspective.

## 2017-07-28 NOTE — PROGRESS NOTES
EGD revealed a small superficial gastric ulcer. This likely is not causing her abdominal pain, but it is possible. There is also a small hiatal hernia.    Colonoscopy to the TI was normal except for a few diverticuli. Random bx were taken to eval for H pylori.    She has a reported hx of cirrhosis. Transaminases are elevated, but bili is normal.  The HBV core IgM is positive, but labs don't seem consistent with acute HBV. Will get HBV sAb and DNA.     Recommend:    -Await Bx  -Continue PPI  -[place] diet  -The patient is feeling better today and would like to go home.  OK to discharge from a GI standpoint. Follow up with Dr. Keith.

## 2017-07-31 LAB
HEPATITIS B VIRAL DNA - QUANTITATIVE: <10 IU/ML
HEPATITIS B VIRUS DNA: NOT DETECTED
LOG HBV IU/ML: <1 LOG (10) IU/ML

## 2017-08-22 ENCOUNTER — TELEPHONE (OUTPATIENT)
Dept: NEUROLOGY | Facility: CLINIC | Age: 57
End: 2017-08-22

## 2017-08-22 NOTE — TELEPHONE ENCOUNTER
LM on several occasions for patient to contact our office to discuss the nature of her visit, and to confirm her appointment once this was done. I have not received a return call regarding this matter.

## 2017-08-23 ENCOUNTER — OFFICE VISIT (OUTPATIENT)
Dept: NEUROLOGY | Facility: CLINIC | Age: 57
End: 2017-08-23
Payer: COMMERCIAL

## 2017-08-23 VITALS
WEIGHT: 132.25 LBS | DIASTOLIC BLOOD PRESSURE: 74 MMHG | HEART RATE: 81 BPM | SYSTOLIC BLOOD PRESSURE: 123 MMHG | BODY MASS INDEX: 21.25 KG/M2 | HEIGHT: 66 IN

## 2017-08-23 DIAGNOSIS — F19.10 SUBSTANCE ABUSE: ICD-10-CM

## 2017-08-23 DIAGNOSIS — G44.52 NEW DAILY PERSISTENT HEADACHE: ICD-10-CM

## 2017-08-23 DIAGNOSIS — M54.2 NECK PAIN: ICD-10-CM

## 2017-08-23 DIAGNOSIS — M54.9 BACK PAIN, UNSPECIFIED BACK LOCATION, UNSPECIFIED BACK PAIN LATERALITY, UNSPECIFIED CHRONICITY: Primary | ICD-10-CM

## 2017-08-23 DIAGNOSIS — I67.83 POSTERIOR REVERSIBLE ENCEPHALOPATHY SYNDROME: Primary | ICD-10-CM

## 2017-08-23 DIAGNOSIS — M54.50 LUMBAR SPINE PAIN: ICD-10-CM

## 2017-08-23 DIAGNOSIS — I10 ESSENTIAL HYPERTENSION: ICD-10-CM

## 2017-08-23 PROBLEM — K27.9 PUD (PEPTIC ULCER DISEASE): Status: ACTIVE | Noted: 2017-08-16

## 2017-08-23 PROBLEM — G25.81 RLS (RESTLESS LEGS SYNDROME): Status: ACTIVE | Noted: 2017-08-16

## 2017-08-23 PROBLEM — Z86.2 HISTORY OF SARCOIDOSIS: Status: ACTIVE | Noted: 2017-07-26

## 2017-08-23 PROBLEM — F41.8 DEPRESSION WITH ANXIETY: Status: ACTIVE | Noted: 2017-08-16

## 2017-08-23 PROCEDURE — 3074F SYST BP LT 130 MM HG: CPT | Mod: S$GLB,,, | Performed by: PSYCHIATRY & NEUROLOGY

## 2017-08-23 PROCEDURE — 99999 PR PBB SHADOW E&M-EST. PATIENT-LVL III: CPT | Mod: PBBFAC,,, | Performed by: PSYCHIATRY & NEUROLOGY

## 2017-08-23 PROCEDURE — 3008F BODY MASS INDEX DOCD: CPT | Mod: S$GLB,,, | Performed by: PSYCHIATRY & NEUROLOGY

## 2017-08-23 PROCEDURE — 99204 OFFICE O/P NEW MOD 45 MIN: CPT | Mod: S$GLB,,, | Performed by: PSYCHIATRY & NEUROLOGY

## 2017-08-23 PROCEDURE — 3078F DIAST BP <80 MM HG: CPT | Mod: S$GLB,,, | Performed by: PSYCHIATRY & NEUROLOGY

## 2017-08-23 RX ORDER — CHLORDIAZEPOXIDE HYDROCHLORIDE AND CLIDINIUM BROMIDE 5; 2.5 MG/1; MG/1
CAPSULE ORAL
COMMUNITY
Start: 2017-06-16 | End: 2017-09-11

## 2017-08-23 RX ORDER — DIPHENOXYLATE HYDROCHLORIDE AND ATROPINE SULFATE 2.5; .025 MG/1; MG/1
1-2 TABLET ORAL
COMMUNITY
Start: 2015-10-28 | End: 2017-09-11

## 2017-08-23 RX ORDER — LORAZEPAM 1 MG/1
1 TABLET ORAL 2 TIMES DAILY PRN
Refills: 0 | COMMUNITY
Start: 2017-08-17 | End: 2019-10-09

## 2017-08-23 RX ORDER — DICYCLOMINE HYDROCHLORIDE 10 MG/1
CAPSULE ORAL
Refills: 0 | COMMUNITY
Start: 2017-08-07 | End: 2017-10-03

## 2017-08-23 RX ORDER — LYSINE HCL 500 MG
TABLET ORAL
COMMUNITY
End: 2017-09-11

## 2017-08-23 RX ORDER — ROPINIROLE 0.25 MG/1
TABLET, FILM COATED ORAL
COMMUNITY
Start: 2017-08-19 | End: 2017-09-11

## 2017-08-23 RX ORDER — ESCITALOPRAM OXALATE 10 MG/1
10 TABLET ORAL EVERY MORNING
Refills: 0 | COMMUNITY
Start: 2017-08-15 | End: 2017-09-11

## 2017-08-23 RX ORDER — MIRTAZAPINE 15 MG/1
15 TABLET, FILM COATED ORAL NIGHTLY
Refills: 0 | COMMUNITY
Start: 2017-08-15 | End: 2017-09-11

## 2017-08-23 RX ORDER — LEVOMEFOLATE CALCIUM 15 MG
15 TABLET ORAL
COMMUNITY
Start: 2013-09-20 | End: 2017-10-03

## 2017-08-23 RX ORDER — GABAPENTIN 300 MG/1
CAPSULE ORAL
Status: ON HOLD | COMMUNITY
Start: 2017-08-15 | End: 2019-10-28 | Stop reason: HOSPADM

## 2017-08-23 RX ORDER — PROMETHAZINE HYDROCHLORIDE 25 MG/1
TABLET ORAL
COMMUNITY
Start: 2017-08-10 | End: 2017-08-23
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
## 2017-08-23 NOTE — PATIENT INSTRUCTIONS
Discussed with patient.  Advised her that her MRI scan abnormalities are most likely related to uncontrolled hypertension and I usually reversible once blood pressure control is achieved.  She has had some headaches however she is noted to have had chronic migraine-like headaches in the past in addition to chronic pain syndrome related to back problems.  Advised her that I think cannot describe any pain medications.  She has been started on magnesium that should help her headaches but it takes about a week or 2 before she will notice a difference.  She is advised continued follow-up with her primary care physician and her psychiatrist.  We will repeat the MRI scan in September/October 2017.  She is otherwise neurologically intact at this time we will contact her with results of the MRI scan.

## 2017-09-07 ENCOUNTER — HOSPITAL ENCOUNTER (OUTPATIENT)
Dept: RADIOLOGY | Facility: HOSPITAL | Age: 57
Discharge: HOME | End: 2017-09-07
Attending: ORTHOPAEDIC SURGERY
Payer: COMMERCIAL

## 2017-09-07 DIAGNOSIS — M54.50 LUMBAR SPINE PAIN: ICD-10-CM

## 2017-09-07 DIAGNOSIS — M54.2 NECK PAIN: ICD-10-CM

## 2017-09-07 DIAGNOSIS — M54.9 BACK PAIN, UNSPECIFIED BACK LOCATION, UNSPECIFIED BACK PAIN LATERALITY, UNSPECIFIED CHRONICITY: ICD-10-CM

## 2017-09-07 PROCEDURE — 72070 X-RAY EXAM THORAC SPINE 2VWS: CPT | Mod: 26,,, | Performed by: RADIOLOGY

## 2017-09-07 PROCEDURE — 72050 X-RAY EXAM NECK SPINE 4/5VWS: CPT | Mod: 26,,, | Performed by: RADIOLOGY

## 2017-09-07 PROCEDURE — 72100 X-RAY EXAM L-S SPINE 2/3 VWS: CPT | Mod: TC

## 2017-09-07 PROCEDURE — 72050 X-RAY EXAM NECK SPINE 4/5VWS: CPT | Mod: TC

## 2017-09-07 PROCEDURE — 72070 X-RAY EXAM THORAC SPINE 2VWS: CPT | Mod: TC

## 2017-09-07 PROCEDURE — 72120 X-RAY BEND ONLY L-S SPINE: CPT | Mod: 26,,, | Performed by: RADIOLOGY

## 2017-09-07 PROCEDURE — 72100 X-RAY EXAM L-S SPINE 2/3 VWS: CPT | Mod: 26,,, | Performed by: RADIOLOGY

## 2017-09-11 ENCOUNTER — OFFICE VISIT (OUTPATIENT)
Dept: ORTHOPEDICS | Facility: CLINIC | Age: 57
End: 2017-09-11
Payer: COMMERCIAL

## 2017-09-11 VITALS — WEIGHT: 149.69 LBS | HEIGHT: 66 IN | BODY MASS INDEX: 24.06 KG/M2

## 2017-09-11 DIAGNOSIS — M54.16 LUMBAR RADICULOPATHY: Primary | ICD-10-CM

## 2017-09-11 DIAGNOSIS — M54.12 CERVICAL RADICULOPATHY: ICD-10-CM

## 2017-09-11 PROCEDURE — 3008F BODY MASS INDEX DOCD: CPT | Mod: S$GLB,,, | Performed by: PHYSICIAN ASSISTANT

## 2017-09-11 PROCEDURE — 99214 OFFICE O/P EST MOD 30 MIN: CPT | Mod: S$GLB,,, | Performed by: PHYSICIAN ASSISTANT

## 2017-09-11 PROCEDURE — 99999 PR PBB SHADOW E&M-EST. PATIENT-LVL III: CPT | Mod: PBBFAC,,, | Performed by: PHYSICIAN ASSISTANT

## 2017-09-11 RX ORDER — LORAZEPAM 2 MG/1
2 TABLET ORAL NIGHTLY
Refills: 0 | COMMUNITY
Start: 2017-07-23 | End: 2017-09-11

## 2017-09-11 RX ORDER — METHOCARBAMOL 750 MG/1
750 TABLET, FILM COATED ORAL 3 TIMES DAILY
Qty: 60 TABLET | Refills: 0 | Status: SHIPPED | OUTPATIENT
Start: 2017-09-11 | End: 2017-10-01

## 2017-09-12 NOTE — PROGRESS NOTES
DATE: 9/12/2017  PATIENT: Earl Abdul    Supervising Physician: William Mathur M.D.    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 She says she was diagnosed with fibromyalgia in the past but she 'doesn't believe in fibromyalgia.'  The patient also has complaints of neck pain (Neck - 10, Arm - 0).  The pain has been present for several years. The patient describes the pain as aching. The pain is worse with sitting, laying down and standing and improved by nothing in particular. There is associated numbness and tingling in the bilateral lower extremities and right hand. There is subjective weakness in the bilateral upper and lower extremities. Prior treatments have included numerous medications including lyrica, baclofen, gabapentin, tylenol, ibuprofen, botox injections with Dr. Rico, and physical therapy, but no ESIs or surgery.      The patient denies myelopathic symptoms such as handwriting changes or difficulty with buttons/coins/keys. Denies perineal paresthesias, bowel/bladder dysfunction.    PAST MEDICAL/SURGICAL HISTORY:  Past Medical History:   Diagnosis Date    Anemia     Anemia     Depression     Fatty liver     Hyperlipidemia     Hypertension     Polysubstance abuse     Posterior reversible encephalopathy syndrome     Sarcoidosis of lung     Sarcoidosis of lung     over 30 yrs ago    Seizures     7/2017    Suicide attempt     Suicide ideation      Past Surgical History:   Procedure Laterality Date    APPENDECTOMY      COLONOSCOPY N/A 7/28/2017    Procedure: COLONOSCOPY;  Surgeon: Aaron Alvarado MD;  Location: [facility];  Service: Endoscopy;  Laterality: N/A;    ESOPHAGOGASTRODUODENOSCOPY  10/7/2016, 11/6/2014 2016 - gastritis, duodenitis, 2014 erosive gastritis    FLEXIBLE SIGMOIDOSCOPY  11/06/2014    colitis     HYSTERECTOMY      mediastenoscopy      TONSILLECTOMY N/A 1970    TUBAL LIGATION         Medications:   Current Outpatient Prescriptions on File Prior to Visit   Medication Sig Dispense Refill    B.ANI/L.ACI/L.SAL/L.PLAN/L.GENO (PROBIOTIC FORMULA ORAL) Take 1 tablet by mouth.      baclofen (LIORESAL) 10 MG tablet Take 10 mg by mouth 3 (three) times daily as needed (for spasms).   0    busPIRone (BUSPAR) 15 MG tablet Take 1 tablet by mouth 3 (three) times daily.       desvenlafaxine (PRISTIQ) 100 MG 24 hr tablet Take 1 tablet by mouth once daily.       dicyclomine (BENTYL) 10 MG capsule   0    gabapentin (NEURONTIN) 300 MG capsule take 1 capsule by mouth four times a day      levomefolate calcium 15 mg Tab Take 15 mg by mouth.      lorazepam (ATIVAN) 1 MG tablet Take 1 mg by mouth 2 (two) times daily.  0    ondansetron (ZOFRAN ODT) 4 MG TbDL Take 1 tablet (4 mg total) by mouth every 8 (eight) hours as needed (nausea). 12 tablet 0    pantoprazole (PROTONIX) 40 MG tablet Take 40 mg by mouth once daily.      potassium chloride (KLOR-CON) 10 MEQ TbSR Take 10 mEq by mouth 2 (two) times daily.      trazodone (DESYREL) 100 MG tablet Take 200 mg by mouth every evening.       vitamin D 1000 units Tab Take 1,000 Units by mouth every evening.        No current facility-administered medications on file prior to visit.        Social History:   Social History     Social History    Marital status:      Spouse name: N/A    Number of children: N/A    Years of education: N/A     Occupational History    Not on file.     Social History Main Topics    Smoking status: Current Every Day Smoker     Packs/day: 1.00     Years: 30.00     Types: Cigarettes    Smokeless tobacco: Never Used    Alcohol use No    Drug use: No    Sexual activity: Yes     Birth control/ protection: Surgical     Other Topics Concern    Not on file     Social History Narrative    No narrative on file       REVIEW OF  "SYSTEMS:  Constitution: Negative. Negative for chills, fever and night sweats.   Cardiovascular: Negative for chest pain and syncope.   Respiratory: Negative for cough and shortness of breath.   Gastrointestinal: See HPI. Negative for nausea/vomiting. Negative for abdominal pain.  Genitourinary: See HPI. Negative for discoloration or dysuria.  Skin: Negative for dry skin, itching and rash.   Hematologic/Lymphatic: Negative for bleeding problem. Does not bruise/bleed easily.   Musculoskeletal: Negative for falls and muscle weakness.   Neurological: See HPI. No seizures.   Endocrine: Negative for polydipsia, polyphagia and polyuria.   Allergic/Immunologic: Negative for hives and persistent infections.     EXAM:  Ht 5' 6" (1.676 m)   Wt 67.9 kg (149 lb 11.1 oz)   BMI 24.16 kg/m²     PHYSICAL EXAMINATION:    General: The patient is a pleasant 57 y.o. female in no apparent distress, the patient is oriented to person, place and time.  Psych: Normal mood and affect  HEENT: Vision grossly intact, hearing intact to the spoken word.  Lungs: Respirations unlabored.  Gait: Normal station and gait, no difficulty with toe or heel walk.   Skin: Cervical skin and dorsal lumbar skin negative for rashes, lesions, hairy patches and surgical scars.    Range of motion: Cervical and lumbar range of motion is acceptable. There is moderate tenderness to palpation of the paracervical muscles.  There is moderate lumbar tenderness to palpation.  Spinal Balance: Global saggital and coronal spinal balance acceptable, no significant for scoliosis and kyphosis.  Musculoskeletal: No pain with the range of motion of the bilateral shoulders and elbows. Normal bulk and contour of the bilateral hands.  No pain with the range of motion of the bilateral hips. Mild bilateral trochanteric tenderness to palpation.  Vascular: Bilateral upper and lower extremities warm and well perfused, radial pulses 2+ bilaterally, dorsalis pedis pulses 2+ " bilaterally.  Neurological: Normal strength and tone in all major motor groups in the bilateral upper and lower extremities. Normal sensation to light touch in the C5-T1 and L2-S1 dermatomes bilaterally.  Deep tendon reflexes symmetric 2+ in the bilateral upper and lower extremities.  Positive Inverted Radial Reflex and negative Villar's bilaterally. Negative Babinski bilaterally. Positive straight leg raise bilaterally, reproduces back pain.     IMAGING:   Today I personally reviewed AP, Lat and Flex/Ex  upright C-spine films that demonstrate moderate degenerative changes with reversal of the normal cervical lordosis.    AP, Lat and Flex/Ex upright lumbar spine films demonstrate moderate degenerative changes with stepwise retrolisthesis.     ASSESSMENT/PLAN:    Diagnoses and all orders for this visit:    Lumbar radiculopathy  -     MRI Cervical Spine Without Contrast; Future  -     MRI Lumbar Spine Without Contrast; Future    Cervical radiculopathy  -     MRI Cervical Spine Without Contrast; Future  -     MRI Lumbar Spine Without Contrast; Future    Other orders  -     methocarbamol (ROBAXIN) 750 MG Tab; Take 1 tablet (750 mg total) by mouth 3 (three) times daily. As needed for muscle spasms    MRI cervical and lumbar spine for further evaluation.  Follow up after the MRIs to discuss results and further treatment including ESIs.     We discussed the department policy regarding pain medications.  Patient understands and agrees.            Return if symptoms worsen or fail to improve.

## 2017-09-25 ENCOUNTER — HOSPITAL ENCOUNTER (OUTPATIENT)
Dept: RADIOLOGY | Facility: OTHER | Age: 57
Discharge: HOME | End: 2017-09-25
Attending: PSYCHIATRY & NEUROLOGY
Payer: COMMERCIAL

## 2017-09-25 ENCOUNTER — HOSPITAL ENCOUNTER (OUTPATIENT)
Dept: RADIOLOGY | Facility: OTHER | Age: 57
Discharge: HOME | End: 2017-09-25
Attending: PHYSICIAN ASSISTANT
Payer: COMMERCIAL

## 2017-09-25 DIAGNOSIS — I67.83 POSTERIOR REVERSIBLE ENCEPHALOPATHY SYNDROME: ICD-10-CM

## 2017-09-25 DIAGNOSIS — M54.12 CERVICAL RADICULOPATHY: ICD-10-CM

## 2017-09-25 DIAGNOSIS — M54.16 LUMBAR RADICULOPATHY: ICD-10-CM

## 2017-10-03 ENCOUNTER — HOSPITAL ENCOUNTER (EMERGENCY)
Facility: OTHER | Age: 57
Discharge: HOME | End: 2017-10-03
Attending: EMERGENCY MEDICINE
Payer: COMMERCIAL

## 2017-10-03 VITALS
OXYGEN SATURATION: 96 % | DIASTOLIC BLOOD PRESSURE: 72 MMHG | TEMPERATURE: 99 F | HEIGHT: 66 IN | WEIGHT: 146 LBS | SYSTOLIC BLOOD PRESSURE: 135 MMHG | HEART RATE: 86 BPM | RESPIRATION RATE: 18 BRPM | BODY MASS INDEX: 23.46 KG/M2

## 2017-10-03 DIAGNOSIS — G43.001 MIGRAINE WITHOUT AURA AND WITH STATUS MIGRAINOSUS, NOT INTRACTABLE: Primary | ICD-10-CM

## 2017-10-03 PROCEDURE — 99283 EMERGENCY DEPT VISIT LOW MDM: CPT

## 2017-10-03 PROCEDURE — 25000003 PHARM REV CODE 250: Performed by: EMERGENCY MEDICINE

## 2017-10-03 RX ORDER — DICYCLOMINE HYDROCHLORIDE 10 MG/1
10 CAPSULE ORAL 4 TIMES DAILY
COMMUNITY
End: 2019-10-09

## 2017-10-03 RX ORDER — METOCLOPRAMIDE 10 MG/1
10 TABLET ORAL
Status: COMPLETED | OUTPATIENT
Start: 2017-10-03 | End: 2017-10-03

## 2017-10-03 RX ORDER — ONDANSETRON 8 MG/1
8 TABLET, ORALLY DISINTEGRATING ORAL 3 TIMES DAILY PRN
Status: ON HOLD | COMMUNITY
End: 2020-02-13 | Stop reason: SDUPTHER

## 2017-10-03 RX ORDER — DEXLANSOPRAZOLE 60 MG/1
60 CAPSULE, DELAYED RELEASE ORAL DAILY
Status: ON HOLD | COMMUNITY
End: 2019-10-28 | Stop reason: HOSPADM

## 2017-10-03 RX ORDER — DOXYCYCLINE 100 MG/1
100 CAPSULE ORAL 2 TIMES DAILY
Status: ON HOLD | COMMUNITY
End: 2017-11-20

## 2017-10-03 RX ORDER — BUTALBITAL, ACETAMINOPHEN AND CAFFEINE 50; 325; 40 MG/1; MG/1; MG/1
1 TABLET ORAL
Status: COMPLETED | OUTPATIENT
Start: 2017-10-03 | End: 2017-10-03

## 2017-10-03 RX ADMIN — BUTALBITAL, ACETAMINOPHEN AND CAFFEINE 1 TABLET: 50; 325; 40 TABLET ORAL at 05:10

## 2017-10-03 RX ADMIN — METOCLOPRAMIDE 10 MG: 10 TABLET ORAL at 05:10

## 2017-10-03 NOTE — ED PROVIDER NOTES
Encounter Date: 10/3/2017    SCRIBE #1 NOTE: I, Krista Steven, am scribing for, and in the presence of,  Dr. Finch. I have scribed the entire note.       History     Chief Complaint   Patient presents with    Headache     headache x 1 weeks with photophobia and noise sensitivity. reports seen at urgent care and was dx with sinus infection without any improvement with prescribed medication     Time seen by provider: 4:40 PM    This is a 57 y.o. female who presents with complaint of headache. She reports onset of symptoms was about 2 weeks ago. The patient states the pain is progressively worsening. She notes about 10 days ago she was evaluated at an urgent care. The patient notes the provider at the facility believed the symptoms may be due to a sinus infection. She was prescribed antibiotics but the symptoms have not been improved with the medication. The patient denies any associated  neck pain, cough, congestion, fever or chills but admits blurred vision, light sensitivity, and nausea with one episode of vomiting this morning. She states she has tried various OTC medication with no change in symptoms. The patient denies any recent trauma or injury to the head. She reports a hx of Migraines but last headache was years ago.       The history is provided by the patient.     Review of patient's allergies indicates:   Allergen Reactions    Fentanyl Other (See Comments)     Other reaction(s): Itching    Lortab  [hydrocodone-acetaminophen] Other (See Comments)    Phenothiazines      Past Medical History:   Diagnosis Date    Anemia     Anemia     Depression     Diverticulitis     Fatty liver     GERD (gastroesophageal reflux disease)     Hyperlipidemia     Hypertension     Pancreatitis     Peptic ulcer disease     Polysubstance abuse     Posterior reversible encephalopathy syndrome     Sarcoidosis of lung     Sarcoidosis of lung     over 30 yrs ago    Seizures     7/2017    Suicide attempt      Suicide ideation      Past Surgical History:   Procedure Laterality Date    APPENDECTOMY      COLONOSCOPY N/A 7/28/2017    Procedure: COLONOSCOPY;  Surgeon: Aaron Alvarado MD;  [facility];  Service: Endoscopy;  Laterality: N/A;    ESOPHAGOGASTRODUODENOSCOPY  10/7/2016, 11/6/2014    2016 - gastritis, duodenitis, 2014 erosive gastritis    FLEXIBLE SIGMOIDOSCOPY  11/06/2014    colitis    HYSTERECTOMY      mediastenoscopy      TONSILLECTOMY N/A 1970    TUBAL LIGATION       Family History   Problem Relation Age of Onset    Heart attack Father     Diabetes Father     Hypertension Father     Diabetes Mother     Hypertension Mother     Breast cancer Maternal Aunt     Colon cancer Maternal Uncle     Breast cancer Daughter     Ovarian cancer Neg Hx      Social History   Substance Use Topics    Smoking status: Current Every Day Smoker     Packs/day: 1.00     Years: 30.00     Types: Cigarettes    Smokeless tobacco: Never Used    Alcohol use No     Review of Systems   Constitutional: Negative for activity change, appetite change, chills, diaphoresis and fever.   HENT: Negative for congestion, sore throat and trouble swallowing.    Eyes: Positive for photophobia and visual disturbance.   Respiratory: Negative for cough, chest tightness and shortness of breath.    Cardiovascular: Negative for chest pain.   Gastrointestinal: Positive for nausea and vomiting. Negative for abdominal pain.   Endocrine: Negative for polydipsia and polyuria.   Genitourinary: Negative for difficulty urinating and flank pain.   Musculoskeletal: Negative for back pain and neck pain.   Skin: Negative for rash.   Neurological: Positive for headaches. Negative for weakness.   Psychiatric/Behavioral: Negative for confusion.       Physical Exam     Initial Vitals [10/03/17 1536]   BP Pulse Resp Temp SpO2   131/60 93 18 98.6 °F (37 °C) 95 %      MAP       83.67         Physical Exam    Nursing note and vitals  reviewed.  Constitutional: She appears well-developed and well-nourished. She is not diaphoretic. No distress.   Non-toxic appearing. Eyes closed throughout exam   HENT:   Head: Normocephalic and atraumatic.   Right Ear: External ear normal.   Left Ear: External ear normal.   Eyes: Conjunctivae and EOM are normal.   Pupils dilated. Symmetric response to light throughout   Neck: Normal range of motion. Neck supple.   Cardiovascular: Normal rate, regular rhythm and normal heart sounds. Exam reveals no gallop and no friction rub.    No murmur heard.  Pulmonary/Chest: Breath sounds normal. She has no wheezes. She has no rhonchi. She has no rales.   Musculoskeletal: Normal range of motion. She exhibits no edema or tenderness.   Lymphadenopathy:     She has no cervical adenopathy.   Neurological: She is alert and oriented to person, place, and time. She has normal strength.   No meningismus. Answers question. Normal finger to nose. Normal strength.    Skin: Skin is warm and dry. No rash noted.         ED Course   Procedures  Labs Reviewed - No data to display          Medical Decision Making:   Initial Assessment:   57-year-old female with multiple emergency room visits, presenting with prolonged headache symptoms.  Patient endorses no head trauma, notes retro-orbital discomfort x 2 weeks. Pt denies vision changes + light and sound sensitivity, no fevers. Pt reports seeing a provider in an Urgent Care and treated for sinusitis, but denies nasal congestion. Pt is well known to the department, with some prior concern for pain seeking behavior. Here pt is non toxic afebrile and ambulatory without issue. Pt has an MRI scheduled for tomorrow and fu with Neurology. No focal deficits or fever to suggest intracranial pathology or serious bacterial infection at this time.             Scribe Attestation:   Scribe #1: I performed the above scribed service and the documentation accurately describes the services I performed. I attest  to the accuracy of the note.    Attending Attestation:           Physician Attestation for Scribe:  Physician Attestation Statement for Scribe #1: I, Dr. Finch, reviewed documentation, as scribed by Kritsa Steven in my presence, and it is both accurate and complete.                 ED Course      Clinical Impression:     1. Migraine without aura and with status migrainosus, not intractable          Disposition:   Disposition: Discharged  Condition: Stable                        Avani Finch MD  10/03/17 6537

## 2017-10-03 NOTE — ED TRIAGE NOTES
"C/o generalized "migraine" headache x 1.5 weeks.  was seen by urgent care 9 days ago for same, diagnosed with sinus infection. Prescribed zolmitriptan and reports no improvement. Pt was found on floor in lobby when called to room. Bizarre behavior noted, pt repeating same information, states "I'm concerned about PRESS Syndrome."    "

## 2017-10-04 ENCOUNTER — HOSPITAL ENCOUNTER (OUTPATIENT)
Dept: RADIOLOGY | Facility: OTHER | Age: 57
Discharge: HOME | End: 2017-10-04
Attending: PSYCHIATRY & NEUROLOGY
Payer: COMMERCIAL

## 2017-10-04 ENCOUNTER — HOSPITAL ENCOUNTER (OUTPATIENT)
Dept: RADIOLOGY | Facility: OTHER | Age: 57
Discharge: HOME | End: 2017-10-04
Attending: PHYSICIAN ASSISTANT
Payer: COMMERCIAL

## 2017-10-04 DIAGNOSIS — I67.83 POSTERIOR REVERSIBLE ENCEPHALOPATHY SYNDROME (PRES): ICD-10-CM

## 2017-10-04 PROCEDURE — 70551 MRI BRAIN STEM W/O DYE: CPT | Mod: TC

## 2017-10-04 PROCEDURE — 70551 MRI BRAIN STEM W/O DYE: CPT | Mod: 26,,, | Performed by: RADIOLOGY

## 2017-10-04 PROCEDURE — 72141 MRI NECK SPINE W/O DYE: CPT | Mod: TC

## 2017-10-04 PROCEDURE — 72148 MRI LUMBAR SPINE W/O DYE: CPT | Mod: 26,,, | Performed by: RADIOLOGY

## 2017-10-04 PROCEDURE — 72148 MRI LUMBAR SPINE W/O DYE: CPT | Mod: TC

## 2017-10-04 PROCEDURE — 72141 MRI NECK SPINE W/O DYE: CPT | Mod: 26,,, | Performed by: RADIOLOGY

## 2017-10-06 ENCOUNTER — OFFICE VISIT (OUTPATIENT)
Dept: INTERNAL MEDICINE | Facility: CLINIC | Age: 57
End: 2017-10-06
Attending: FAMILY MEDICINE
Payer: COMMERCIAL

## 2017-10-06 VITALS
HEIGHT: 66 IN | OXYGEN SATURATION: 95 % | SYSTOLIC BLOOD PRESSURE: 150 MMHG | HEART RATE: 73 BPM | DIASTOLIC BLOOD PRESSURE: 88 MMHG | BODY MASS INDEX: 24.17 KG/M2 | WEIGHT: 150.38 LBS

## 2017-10-06 DIAGNOSIS — R51.9 NONINTRACTABLE HEADACHE, UNSPECIFIED CHRONICITY PATTERN, UNSPECIFIED HEADACHE TYPE: Primary | ICD-10-CM

## 2017-10-06 PROCEDURE — 96372 THER/PROPH/DIAG INJ SC/IM: CPT | Mod: S$GLB,,, | Performed by: FAMILY MEDICINE

## 2017-10-06 PROCEDURE — 99999 PR PBB SHADOW E&M-EST. PATIENT-LVL III: CPT | Mod: PBBFAC,,, | Performed by: FAMILY MEDICINE

## 2017-10-06 PROCEDURE — 99214 OFFICE O/P EST MOD 30 MIN: CPT | Mod: 25,S$GLB,, | Performed by: FAMILY MEDICINE

## 2017-10-06 RX ORDER — ZOLMITRIPTAN 5 MG/1
TABLET, FILM COATED ORAL
Refills: 0 | COMMUNITY
Start: 2017-09-30 | End: 2017-10-06

## 2017-10-06 RX ORDER — AMLODIPINE BESYLATE 5 MG/1
TABLET ORAL
Status: ON HOLD | COMMUNITY
Start: 2017-09-25 | End: 2017-11-20

## 2017-10-06 RX ORDER — KETOROLAC TROMETHAMINE 30 MG/ML
30 INJECTION, SOLUTION INTRAMUSCULAR; INTRAVENOUS ONCE
Status: COMPLETED | OUTPATIENT
Start: 2017-10-06 | End: 2017-10-06

## 2017-10-06 RX ORDER — METOPROLOL SUCCINATE 50 MG/1
TABLET, EXTENDED RELEASE ORAL
Status: ON HOLD | COMMUNITY
Start: 2017-09-25 | End: 2017-11-20

## 2017-10-06 RX ORDER — SUMATRIPTAN 50 MG/1
50 TABLET, FILM COATED ORAL EVERY 8 HOURS PRN
Qty: 12 TABLET | Refills: 0 | Status: SHIPPED | OUTPATIENT
Start: 2017-10-06 | End: 2019-10-09

## 2017-10-06 RX ADMIN — KETOROLAC TROMETHAMINE 30 MG: 30 INJECTION, SOLUTION INTRAMUSCULAR; INTRAVENOUS at 12:10

## 2017-10-06 NOTE — PROGRESS NOTES
"Subjective:      Patient ID: Earl Abdul is a 57 y.o. female.    Chief Complaint: Migraine    Patient of Dr. Galvin seen today for urgent care appointment. She was seen in the ED 3 days ago for migraine and had an MRI completed 2 days ago that was negative. She has tried advil cold and sinus with no improvement. 5 days ago she was started on prednisone and antibiotic for sinus infection. The sinus infection started over 1-2 weeks ago. 3 weeks ago she reports the start of the headache, it is generalized over all head ache like pain. No improvement with prednisone. She reports nausea with this and mainly upon waking up. She has taken tylenol and advil today.       Review of Systems   Constitutional: Negative.    Respiratory: Negative.    Cardiovascular: Negative.    Gastrointestinal: Negative.    Neurological: Positive for headaches.     I personally reviewed Past Medical History, Past Surgical history,  Past Social History and Family History    Objective:   BP (!) 150/88   Pulse 73   Ht 5' 6" (1.676 m)   Wt 68.2 kg (150 lb 5.7 oz)   SpO2 95%   BMI 24.27 kg/m²     Physical Exam   Constitutional: She is oriented to person, place, and time. She appears well-developed and well-nourished.   HENT:   Head: Normocephalic and atraumatic.   Right Ear: Hearing, tympanic membrane, external ear and ear canal normal.   Left Ear: Hearing, tympanic membrane, external ear and ear canal normal.   Nose: Nose normal.   Mouth/Throat: Oropharynx is clear and moist.   Eyes: Conjunctivae and EOM are normal. Pupils are equal, round, and reactive to light. Right eye exhibits no discharge. Left eye exhibits no discharge. No scleral icterus.   Neck: Normal range of motion. Neck supple.   Cardiovascular: Normal rate, regular rhythm, normal heart sounds and intact distal pulses.  Exam reveals no gallop and no friction rub.    No murmur heard.  Pulmonary/Chest: Effort normal and breath sounds normal. No respiratory distress. She has no " wheezes. She has no rales.   Abdominal: Soft. Bowel sounds are normal.   Neurological: She is alert and oriented to person, place, and time. No cranial nerve deficit.       Earl was seen today for migraine.    Diagnoses and all orders for this visit:    Nonintractable headache, unspecified chronicity pattern, unspecified headache type    -discussed ER prompts and follow up if no improvement of headache  -     Ambulatory consult to Neurology  -     ketorolac injection 30 mg; Inject 30 mg into the muscle once.  -     sumatriptan (IMITREX) 50 MG tablet; Take 1 tablet (50 mg total) by mouth every 8 (eight) hours as needed for  Other orders   Migraine.

## 2017-10-07 ENCOUNTER — PATIENT MESSAGE (OUTPATIENT)
Dept: NEUROLOGY | Facility: CLINIC | Age: 57
End: 2017-10-07

## 2017-10-09 ENCOUNTER — OFFICE VISIT (OUTPATIENT)
Dept: ORTHOPEDICS | Facility: CLINIC | Age: 57
End: 2017-10-09
Payer: COMMERCIAL

## 2017-10-09 VITALS — BODY MASS INDEX: 24.04 KG/M2 | HEIGHT: 66 IN | WEIGHT: 149.56 LBS

## 2017-10-09 DIAGNOSIS — M54.16 LUMBAR RADICULOPATHY: Primary | ICD-10-CM

## 2017-10-09 DIAGNOSIS — M54.12 CERVICAL RADICULOPATHY: ICD-10-CM

## 2017-10-09 PROCEDURE — 99213 OFFICE O/P EST LOW 20 MIN: CPT | Mod: S$GLB,,, | Performed by: PHYSICIAN ASSISTANT

## 2017-10-09 PROCEDURE — 99999 PR PBB SHADOW E&M-EST. PATIENT-LVL IV: CPT | Mod: PBBFAC,,, | Performed by: PHYSICIAN ASSISTANT

## 2017-10-09 RX ORDER — MELOXICAM 15 MG/1
15 TABLET ORAL DAILY
Qty: 30 TABLET | Refills: 0 | Status: SHIPPED | OUTPATIENT
Start: 2017-10-09 | End: 2017-11-08

## 2017-10-09 NOTE — PROGRESS NOTES
Ms. Abdul returns for MRI results.  She continues to have neck pain.  She also reports a headache today.  She is requesting an appointment with Dr. Rico.      The patient denies myelopathic symptoms such as handwriting changes or difficulty with buttons/coins/keys. Denies perineal paresthesias, bowel/bladder dysfunction.       Physical exam stable.  Neuro exam stable.     MRI cervical spine shows foraminal stenosis at C3/4 and C5/6.    MRI lumbar spine shows mild degenerative changes without significant spinal stenosis.     A/P:  No surgical intervention indicated.  Referral for PT at [facility].    Follow up after therapy if symptoms persist.

## 2017-10-11 ENCOUNTER — OFFICE VISIT (OUTPATIENT)
Dept: NEUROLOGY | Facility: CLINIC | Age: 57
End: 2017-10-11
Payer: COMMERCIAL

## 2017-10-11 VITALS
BODY MASS INDEX: 24.03 KG/M2 | WEIGHT: 149.5 LBS | DIASTOLIC BLOOD PRESSURE: 83 MMHG | HEIGHT: 66 IN | HEART RATE: 107 BPM | SYSTOLIC BLOOD PRESSURE: 136 MMHG

## 2017-10-11 DIAGNOSIS — F19.11 HISTORY OF SUBSTANCE ABUSE: ICD-10-CM

## 2017-10-11 DIAGNOSIS — I67.83 POSTERIOR REVERSIBLE ENCEPHALOPATHY SYNDROME: Primary | ICD-10-CM

## 2017-10-11 DIAGNOSIS — I10 ESSENTIAL HYPERTENSION: ICD-10-CM

## 2017-10-11 DIAGNOSIS — F32.A DEPRESSION, UNSPECIFIED DEPRESSION TYPE: ICD-10-CM

## 2017-10-11 PROCEDURE — 99214 OFFICE O/P EST MOD 30 MIN: CPT | Mod: S$GLB,,, | Performed by: PSYCHIATRY & NEUROLOGY

## 2017-10-11 PROCEDURE — 99999 PR PBB SHADOW E&M-EST. PATIENT-LVL III: CPT | Mod: PBBFAC,,, | Performed by: PSYCHIATRY & NEUROLOGY

## 2017-10-11 NOTE — PROGRESS NOTES
Subjective:       Patient ID: Earl Abdul is a 57 y.o. female.    Chief Complaint:  Headache      History of Present Illness  HPI   This is a 57-year-old right-handed female who had been seen by me following neurological problems for which she was admitted to the hospital in July 2017.  At that time she was seen by [facility] as she had an abnormal MRI scan that was consistent with PRES syndrome.  She has a past medical history of HTN. Lumbar back pain and substance abuse.  When seen at the emergency room prior to that admission she was noted to have a shaking spell that lasted for about a minute or 2.  The possibility of seizures was considered.  However it is noted that patient had been recently discharged from the [place] for detoxification from alcohol.  She has had a history of previous tremulousness related to alcohol use.  Following her last visit she had another MRI scan of the brain which was normal with complete resolution of changes related to the PRES syndrome.    She comes in to see me today as a follow-up condition with complaints of headaches that are primarily right-sided.  She does report that she has had a long history of migraine headaches and had been seen on one occasion at [facility] over 10 years ago.  She also has a history of pain medication overuse in the past as well as alcohol abuse and has gone through rehabilitation process.  She is presently under the care of psychiatrist who is treating her for the substance abuse issues and other psychiatric issues.  She is on magnesium supplementation prescribed by the psychiatrist.         Review of Systems  Review of Systems   Constitutional: Negative.    HENT: Negative.  Negative for hearing loss.    Eyes: Negative.  Negative for visual disturbance.   Respiratory: Negative.  Negative for shortness of breath.    Cardiovascular: Negative.  Negative for chest pain and palpitations.   Gastrointestinal: Negative.    Genitourinary: Negative.     Musculoskeletal: Negative.  Negative for back pain, gait problem and neck pain.   Skin: Negative.    Neurological: Positive for headaches. Negative for tremors, seizures, syncope, speech difficulty, weakness and numbness.   Psychiatric/Behavioral: Negative.  Negative for confusion and decreased concentration.       Objective:      Neurologic Exam     Mental Status   Oriented to person, place, and time.   Registration: recalls 3 of 3 objects. Recall at 5 minutes: recalls 3 of 3 objects. Follows 3 step commands.   Attention: normal. Concentration: normal.   Speech: speech is normal   Level of consciousness: alert  Knowledge: good. Able to perform simple calculations.   Able to name object. Able to read. Able to repeat. Able to write. Normal comprehension.     Cranial Nerves   Cranial nerves II through XII intact.     Motor Exam   Muscle bulk: normal  Overall muscle tone: normal    Strength   Strength 5/5 throughout.     Sensory Exam   Light touch normal.   Vibration normal.   Proprioception normal.   Pinprick normal.     Gait, Coordination, and Reflexes     Gait  Gait: normal    Coordination   Romberg: negative  Finger to nose coordination: normal  Heel to shin coordination: normal  Tandem walking coordination: normal    Tremor   Resting tremor: absent  Intention tremor: absent  Action tremor: absent    Reflexes   Right brachioradialis: 2+  Left brachioradialis: 2+  Right biceps: 2+  Left biceps: 2+  Right triceps: 2+  Left triceps: 2+  Right patellar: 2+  Left patellar: 2+  Right achilles: 2+  Left achilles: 2+  Right plantar: normal  Left plantar: normal      Physical Exam   Constitutional: She is oriented to person, place, and time. She appears well-developed and well-nourished.   HENT:   Head: Normocephalic and atraumatic.   Neck: Normal range of motion. Neck supple.   Neurological: She is oriented to person, place, and time. She has normal strength. She has a normal Finger-Nose-Finger Test, a normal Heel to  Yen Test, a normal Romberg Test and a normal Tandem Gait Test. Gait normal.   Reflex Scores:       Tricep reflexes are 2+ on the right side and 2+ on the left side.       Bicep reflexes are 2+ on the right side and 2+ on the left side.       Brachioradialis reflexes are 2+ on the right side and 2+ on the left side.       Patellar reflexes are 2+ on the right side and 2+ on the left side.       Achilles reflexes are 2+ on the right side and 2+ on the left side.  Psychiatric: Her speech is normal.   Vitals reviewed.        Assessment:        1. Posterior reversible encephalopathy syndrome    2. Depression, unspecified depression type    3. Essential hypertension    4. History of substance abuse            Plan:       Discussed with patient.  She is advised continued follow-up with her primary care physician and her psychiatrist.  She will continue follow-up with her psychiatrist regarding the chronic pain syndrome and chronic headaches.  She is advised to add coenzyme Q10 20 present regimen of symptoms and OTC Migrelief as needed for headaches.  She will discuss these with her psychiatrist.  She is discharged from this clinic.

## 2017-10-11 NOTE — PATIENT INSTRUCTIONS
Discussed with patient.  She is advised continued follow-up with her primary care physician and her psychiatrist.  She will continue follow-up with her psychiatrist regarding the chronic pain syndrome and chronic headaches.  She is advised to add coenzyme Q10 20 present regimen of symptoms and OTC Migrelief as needed for headaches.  She will discuss these with her psychiatrist.

## 2017-10-24 ENCOUNTER — OFFICE VISIT (OUTPATIENT)
Dept: SPINE | Facility: CLINIC | Age: 57
End: 2017-10-24
Attending: ANESTHESIOLOGY
Payer: COMMERCIAL

## 2017-10-24 VITALS
DIASTOLIC BLOOD PRESSURE: 81 MMHG | WEIGHT: 149 LBS | HEART RATE: 91 BPM | BODY MASS INDEX: 23.95 KG/M2 | SYSTOLIC BLOOD PRESSURE: 136 MMHG | HEIGHT: 66 IN

## 2017-10-24 DIAGNOSIS — G24.3 ISOLATED CERVICAL DYSTONIA: ICD-10-CM

## 2017-10-24 DIAGNOSIS — M47.26 OSTEOARTHRITIS OF SPINE WITH RADICULOPATHY, LUMBAR REGION: ICD-10-CM

## 2017-10-24 DIAGNOSIS — M62.838 MUSCLE SPASM: Primary | ICD-10-CM

## 2017-10-24 PROCEDURE — 99244 OFF/OP CNSLTJ NEW/EST MOD 40: CPT | Mod: S$GLB,,, | Performed by: ANESTHESIOLOGY

## 2017-10-24 PROCEDURE — 99999 PR PBB SHADOW E&M-EST. PATIENT-LVL III: CPT | Mod: PBBFAC,,, | Performed by: ANESTHESIOLOGY

## 2017-10-24 NOTE — LETTER
October 24, 2017      Maura Can PA-C  [facility] [address]           [facility]  [address], [address]  [facility] [address]  Phone: [phone]  Fax: [phone]          Patient: Earl Abdul   MR Number: [id number]   YOB: 1960   Date of Visit: 10/24/2017       Dear Maura Can:    Thank you for referring Earl Abdul to me for evaluation. Attached you will find relevant portions of my assessment and plan of care.    If you have questions, please do not hesitate to call me. I look forward to following Earl Abdul along with you.    Sincerely,    Guillaume Rico MD    Enclosure  CC:  No Recipients    If you would like to receive this communication electronically, please contact [email or web address] or [phone] to request more information on [facility] [employer] access.    For providers and/or their staff who would like to refer a patient to [facility], please contact us through our one-stop-shop provider referral line, [facility], at [phone].    If you feel you have received this communication in error or would no longer like to receive these types of communications, please e-mail [email or web address]

## 2017-10-24 NOTE — PROGRESS NOTES
"Chronic Pain - New Consult    Referring Physician: Maura Can PA-C    Chief Complaint:   Chief Complaint   Patient presents with    Neck Pain    Low-back Pain        SUBJECTIVE:    Earl Abdul is a 58 yo F here chronic neck and low back pain.    Neck Pain:  Right worse than left, for "many many" years. The pain radiates into the right shoulder and up into her jaw. Pain described as dull, aching, constant pain. Endorses numbness and tingling in both her UEs.   Low back pain:  Going on for "many many" years as well. Pain radiates down both legs, but right much more often and worse. Pain described as a shooting, sharp pain. Endorses numbness in bilateral legs below the knees.     She is currently undergoing Ketamine therapy which does seem to give some relief. She has had PT 1 year ago which gave her strength but did not provide pain relief.   Also takes advil, tylenol, and baclofen with minimal relief.    Denies saddle anesthesia. She endorses occasional bowel and bladder incontinence for years. Bowel incontinence much worse than bladder. She endorses that she does know when she needs to use the bathroom, but sometimes can not make it.    She has had a lumbar epidural "years" ago which did not provide.  She has had botox to her neck years ago which did provide relief.    Past Medical History:   Diagnosis Date    Anemia     Anemia     Depression     Diverticulitis     Fatty liver     GERD (gastroesophageal reflux disease)     Hyperlipidemia     Hypertension     Pancreatitis     Peptic ulcer disease     Polysubstance abuse     Posterior reversible encephalopathy syndrome     Sarcoidosis of lung     Sarcoidosis of lung     over 30 yrs ago    Seizures     7/2017    Suicide attempt     Suicide ideation      Past Surgical History:   Procedure Laterality Date    APPENDECTOMY      COLONOSCOPY N/A 7/28/2017    Procedure: COLONOSCOPY;  Surgeon: Aaron Alvarado MD;  Location[facility];  " Service: Endoscopy;  Laterality: N/A;    ESOPHAGOGASTRODUODENOSCOPY  10/7/2016, 11/6/2014    2016 - gastritis, duodenitis, 2014 erosive gastritis    FLEXIBLE SIGMOIDOSCOPY  11/06/2014    colitis    HYSTERECTOMY      mediastenoscopy      TONSILLECTOMY N/A 1970    TUBAL LIGATION       Social History     Social History    Marital status:      Spouse name: N/A    Number of children: N/A    Years of education: N/A     Occupational History    Not on file.     Social History Main Topics    Smoking status: Current Every Day Smoker     Packs/day: 1.00     Years: 30.00     Types: Cigarettes    Smokeless tobacco: Never Used    Alcohol use No    Drug use: No    Sexual activity: Yes     Birth control/ protection: Surgical     Other Topics Concern    Not on file     Social History Narrative    No narrative on file     Family History   Problem Relation Age of Onset    Heart attack Father     Diabetes Father     Hypertension Father     Diabetes Mother     Hypertension Mother     Breast cancer Maternal Aunt     Colon cancer Maternal Uncle     Breast cancer Daughter     Ovarian cancer Neg Hx        Review of patient's allergies indicates:   Allergen Reactions    Fentanyl Other (See Comments)     Other reaction(s): Itching    Lortab  [hydrocodone-acetaminophen] Other (See Comments)    Phenothiazines        Current Outpatient Prescriptions   Medication Sig    amlodipine (NORVASC) 5 MG tablet     B.ANI/L.ACI/L.SAL/L.PLAN/L.GENO (PROBIOTIC FORMULA ORAL) Take 1 tablet by mouth once daily.     baclofen (LIORESAL) 10 MG tablet Take 10 mg by mouth 3 (three) times daily as needed (for spasms).     busPIRone (BUSPAR) 15 MG tablet Take 1 tablet by mouth 3 (three) times daily.     desvenlafaxine (PRISTIQ) 100 MG 24 hr tablet Take 1 tablet by mouth once daily.     dexlansoprazole (DEXILANT) 60 mg capsule Take 60 mg by mouth once daily.    dicyclomine (BENTYL) 10 MG capsule Take 10 mg by mouth 4 (four)  "times daily.    doxycycline (VIBRAMYCIN) 100 MG Cap Take 100 mg by mouth 2 (two) times daily.    gabapentin (NEURONTIN) 300 MG capsule take 1 capsule by mouth four times a day    lorazepam (ATIVAN) 1 MG tablet Take 1 mg by mouth 2 (two) times daily as needed for Anxiety.     meloxicam (MOBIC) 15 MG tablet Take 1 tablet (15 mg total) by mouth once daily.    metoprolol succinate (TOPROL-XL) 50 MG 24 hr tablet     ondansetron (ZOFRAN ODT) 8 MG TbDL Take 8 mg by mouth 3 (three) times daily as needed (for nausea and vomiting).    pantoprazole (PROTONIX) 40 MG tablet Take 40 mg by mouth once daily.    prenatal92-iron-folate8-ps-dha (ENBRACE HR) 1.5 mg iron- 8.73 mg-6.4 mg CpID Take 1 capsule by mouth once daily.    sumatriptan (IMITREX) 50 MG tablet Take 1 tablet (50 mg total) by mouth every 8 (eight) hours as needed for Migraine.    trazodone (DESYREL) 100 MG tablet Take 300 mg by mouth every evening.     UNABLE TO FIND medication name: NEURO-MAG Take 3 capsules by mouth once daily    UNABLE TO FIND medication name: Sleep Reset - Take 1 packet by mouth once daily    vitamin D 1000 units Tab Take 1,000 Units by mouth every evening.      No current facility-administered medications for this visit.        REVIEW OF SYSTEMS:  Negative except per HPI    OBJECTIVE:    Ht 5' 6" (1.676 m)   Wt 67.6 kg (149 lb)   BMI 24.05 kg/m²     PHYSICAL EXAMINATION:    AOx3, NAD, non-labored breathing  Left hand resting tremor    NECK EXAM  INSPECTION: Supple. Skin intact. Atraumatic. Neck turns slightly towards the right  PALPATION: no TTP in bilateral trapezius and cervical paraspinal muscles  ROM:    FLEXION:  decreased    EXTENSION: decreased   LATERAL ROTATION:  decreased   LATERAL BENDING: decreased  MOTOR: 5/5 and symmetrical muscle strength in bilateral upper extremities   SENSORY: Intact to light touch in bilateral upper extremities   REFLEXES:   BICEPS: 2+ and symmetrical.    TRICEPS: 2+ and " symmetrical.   BRACHIORADIALIS:  2+ and symmetrical.  PULSES: 2+ distal pulses    FACET LOADING:  + pain to the right  SPURLINGS TEST:   Unremarkable bilaterally.    PETERS:   Negative bilaterally.    Lumbar Spine Exam:  INSPECTION: skin intact. No kyphosis, lordosis or scoliosis noted  PALPATION:    Lumbar paraspinals:  TTP bilateral   SIJ:     Right:  TTP    Left:  TTP  ROM:    LUMBAR FLEXION:  full   LUMBAR EXTENSION: decreased due to pain   FACET LOADING:  + bilateral   HIP EXAM: no pain with internal and external rotation of the hip joint.   MOTOR:    RLE: 5 / 5    LLE: 5 / 5   SENSORY:  Intact to light touch in bilateral lower extremities   DEEP TENDON REFLEXES:   PATELLAR:2+ and symmetrical.    ACHILLES: 2+ and symmetrical.   PULSES: 2+ distal Bilateral lower extremities  BABINSKI:  down going toes bilat   ANKLE CLONUS:   Absent.     STRAIGHT LEG RAISE: - right side; - left side  PIRIFORMIS MUSCLE STRETCH: - right side; - left side  FEMORAL NERVE STRETCH TEST:  - right side; - left side  SPHINX TEST:  + increased low back pain  FABERE'S TEST:   + right side; + left side  MILGRAM'S TEST: neg      GAIT AND DEN:  normal          ASSESSMENT:     1. Muscle spasm  Medication Pre-Authorization   2. Isolated cervical dystonia  Medication Pre-Authorization   3. Osteoarthritis of spine with radiculopathy, lumbar region           PLAN:     The above plan and management options were discussed at length with patient. Patient is in agreement with the above and verbalized understanding. It will be communicated with the referring physician via electronic record, fax, or mail.    1. Schedule for Botox injections to the neck. Has had prior botox injections with good relief.  2. Schedule for L4-L5 interlaminar epidural steroid injection  3. RTC for Botox and follow up.    Elio Severino MD [employer] [facility] PGY2      I have personally taken the history and examined this patient and agree with the resident's note as stated  above.

## 2017-10-25 ENCOUNTER — TELEPHONE (OUTPATIENT)
Dept: PAIN MEDICINE | Facility: CLINIC | Age: 57
End: 2017-10-25

## 2017-11-01 ENCOUNTER — OFFICE VISIT (OUTPATIENT)
Dept: PAIN MEDICINE | Facility: CLINIC | Age: 57
End: 2017-11-01
Attending: ANESTHESIOLOGY
Payer: COMMERCIAL

## 2017-11-01 VITALS
SYSTOLIC BLOOD PRESSURE: 145 MMHG | HEART RATE: 81 BPM | DIASTOLIC BLOOD PRESSURE: 70 MMHG | TEMPERATURE: 98 F | HEIGHT: 66 IN | WEIGHT: 150.56 LBS | RESPIRATION RATE: 20 BRPM | BODY MASS INDEX: 24.2 KG/M2

## 2017-11-01 DIAGNOSIS — G43.119 INTRACTABLE MIGRAINE WITH AURA WITHOUT STATUS MIGRAINOSUS: ICD-10-CM

## 2017-11-01 DIAGNOSIS — M62.838 MUSCLE SPASM: ICD-10-CM

## 2017-11-01 DIAGNOSIS — G24.3 ISOLATED CERVICAL DYSTONIA: Primary | ICD-10-CM

## 2017-11-01 PROCEDURE — 99999 PR PBB SHADOW E&M-EST. PATIENT-LVL III: CPT | Mod: PBBFAC,,, | Performed by: ANESTHESIOLOGY

## 2017-11-01 PROCEDURE — 64612 DESTROY NERVE FACE MUSCLE: CPT | Mod: S$GLB,,, | Performed by: ANESTHESIOLOGY

## 2017-11-01 PROCEDURE — 99499 UNLISTED E&M SERVICE: CPT | Mod: S$GLB,,, | Performed by: ANESTHESIOLOGY

## 2017-11-01 PROCEDURE — 95874 GUIDE NERV DESTR NEEDLE EMG: CPT | Mod: S$GLB,,, | Performed by: ANESTHESIOLOGY

## 2017-11-01 PROCEDURE — 64616 CHEMODENERV MUSC NECK DYSTON: CPT | Mod: 50,S$GLB,, | Performed by: ANESTHESIOLOGY

## 2017-11-01 NOTE — PROGRESS NOTES
Subjective:      Patient ID: Earl Abdul is a 57 y.o. female.    Chief Complaint: Low-back Pain  neck pain  Referred by: Guillaume Rico MD     HPI  Patient is a 56 yo female here today for botox to her neck for history of cervical dystonia. Patient was seen one week ago and was scheduled from a L4-5 ILESI and for the botox today. Patient states her neck pain has not changed in location or frequency. She denies nay recent infections, fever, or use of Abx. She would like to proceed with the botox injections today. States she has gotten the botox injections before in her neck with 3+ months of excellent benefit, decreased frequency of headaches, and much improved quality of life.   She has intractable migraine headaches that responds mildly to Imitrex.  She has at least 15 days of headache a month.  Interventional Pain History  Past Medical History:   Diagnosis Date    Anemia     Anemia     Depression     Diverticulitis     Fatty liver     GERD (gastroesophageal reflux disease)     Hyperlipidemia     Hypertension     Pancreatitis     Peptic ulcer disease     Polysubstance abuse     Posterior reversible encephalopathy syndrome     Sarcoidosis of lung     Sarcoidosis of lung     over 30 yrs ago    Seizures     7/2017    Suicide attempt     Suicide ideation        Past Surgical History:   Procedure Laterality Date    APPENDECTOMY      COLONOSCOPY N/A 7/28/2017    Procedure: COLONOSCOPY;  Surgeon: Aaron Alvarado MD;  Location: [facility];  Service: Endoscopy;  Laterality: N/A;    ESOPHAGOGASTRODUODENOSCOPY  10/7/2016, 11/6/2014 2016 - gastritis, duodenitis, 2014 erosive gastritis    FLEXIBLE SIGMOIDOSCOPY  11/06/2014    colitis    HYSTERECTOMY      mediastenoscopy      TONSILLECTOMY N/A 1970    TUBAL LIGATION         Review of patient's allergies indicates:   Allergen Reactions    Fentanyl Other (See Comments)     Other reaction(s): Itching    Lortab  [hydrocodone-acetaminophen] Other (See  Comments)    Phenothiazines        Current Outpatient Prescriptions   Medication Sig Dispense Refill    amlodipine (NORVASC) 5 MG tablet       B.ANI/L.ACI/L.SAL/L.PLAN/L.GENO (PROBIOTIC FORMULA ORAL) Take 1 tablet by mouth once daily.       baclofen (LIORESAL) 10 MG tablet Take 10 mg by mouth 3 (three) times daily as needed (for spasms).   0    busPIRone (BUSPAR) 15 MG tablet Take 1 tablet by mouth 3 (three) times daily.       desvenlafaxine (PRISTIQ) 100 MG 24 hr tablet Take 1 tablet by mouth once daily.       dexlansoprazole (DEXILANT) 60 mg capsule Take 60 mg by mouth once daily.      dicyclomine (BENTYL) 10 MG capsule Take 10 mg by mouth 4 (four) times daily.      doxycycline (VIBRAMYCIN) 100 MG Cap Take 100 mg by mouth 2 (two) times daily.      gabapentin (NEURONTIN) 300 MG capsule take 1 capsule by mouth four times a day      lorazepam (ATIVAN) 1 MG tablet Take 1 mg by mouth 2 (two) times daily as needed for Anxiety.   0    meloxicam (MOBIC) 15 MG tablet Take 1 tablet (15 mg total) by mouth once daily. 30 tablet 0    metoprolol succinate (TOPROL-XL) 50 MG 24 hr tablet       ondansetron (ZOFRAN ODT) 8 MG TbDL Take 8 mg by mouth 3 (three) times daily as needed (for nausea and vomiting).      pantoprazole (PROTONIX) 40 MG tablet Take 40 mg by mouth once daily.      prenatal92-iron-folate8-ps-dha (ENBRACE HR) 1.5 mg iron- 8.73 mg-6.4 mg CpID Take 1 capsule by mouth once daily.      sumatriptan (IMITREX) 50 MG tablet Take 1 tablet (50 mg total) by mouth every 8 (eight) hours as needed for Migraine. 12 tablet 0    trazodone (DESYREL) 100 MG tablet Take 300 mg by mouth every evening.       UNABLE TO FIND medication name: NEURO-MAG Take 3 capsules by mouth once daily      UNABLE TO FIND medication name: Sleep Reset - Take 1 packet by mouth once daily      vitamin D 1000 units Tab Take 1,000 Units by mouth every evening.        No current facility-administered medications for this visit.   "      Family History   Problem Relation Age of Onset    Heart attack Father     Diabetes Father     Hypertension Father     Diabetes Mother     Hypertension Mother     Breast cancer Maternal Aunt     Colon cancer Maternal Uncle     Breast cancer Daughter     Ovarian cancer Neg Hx        Social History     Social History    Marital status:      Spouse name: N/A    Number of children: N/A    Years of education: N/A     Occupational History    Not on file.     Social History Main Topics    Smoking status: Current Every Day Smoker     Packs/day: 1.00     Years: 30.00     Types: Cigarettes    Smokeless tobacco: Never Used    Alcohol use No    Drug use: No    Sexual activity: Yes     Birth control/ protection: Surgical     Other Topics Concern    Not on file     Social History Narrative    No narrative on file           Review of Systems   Constitution: Negative.   HENT: Negative.    Eyes: Negative.    Cardiovascular: Negative.    Respiratory: Negative.    Endocrine: Negative.    Skin: Negative.    Musculoskeletal: Positive for myalgias and neck pain.   Gastrointestinal: Negative.    Neurological: Negative.    Psychiatric/Behavioral: Negative.            Objective:      BP (!) 145/70   Pulse 81   Temp 98.2 °F (36.8 °C) (Oral)   Resp 20   Ht 5' 6" (1.676 m)   Wt 68.3 kg (150 lb 9.2 oz)   BMI 24.30 kg/m²         General    Constitutional: She is oriented to person, place, and time. She appears well-developed and well-nourished.   HENT:   Head: Normocephalic and atraumatic.   Eyes: EOM are normal. Pupils are equal, round, and reactive to light.   Cardiovascular: Normal rate and regular rhythm.    Pulmonary/Chest: Effort normal.   Abdominal: Soft.   Neurological: She is alert and oriented to person, place, and time. She has normal reflexes.   Psychiatric: She has a normal mood and affect.         Back (L-Spine & T-Spine) / Neck (C-Spine) Exam     Tenderness Right paramedian tenderness of the " Upper C-Spine and Lower C-Spine. Left paramedian tenderness of the Upper C-Spine and Lower C-Spine.     Neck (C-Spine) Range of Motion   Flexion:     Limited  Extension: Limited  Right Lateral Bend: abnormal  Left Lateral Bend: abnormal  Right Rotation: abnormal  Left Rotation: abnormal    Spinal Sensation   Right Side Sensation  C-Spine Level: normal   Left Side Sensation  C-Spine Level: normal      Muscle Strength   Right Upper Extremity   Biceps: 5/5/5   Deltoid:  5/5  Triceps:  5/5  Wrist Extension: 5/5/5   Wrist Flexion: 5/5/5   Finger Flexors:  5/5  Finger Extensors:  5/5  Left Upper Extremity  Biceps: 5/5/5   Deltoid:  5/5  Triceps:  5/5  Wrist Extension: 5/5/5   Wrist Flexion: 5/5/5   Finger Flexors:  5/5  Finger Extensors:  5/5    Reflexes     Left Side  Left Villar's Sign:  Absent    Right Side   Right Villar's Sign:  absent        + ttp along bilateral spenius capitus, trapezius, rhomboids, levator scapula  Assessment:       Encounter Diagnoses   Name Primary?    Isolated cervical dystonia Yes    Muscle spasm     Intractable migraine with aura without status migrainosus          Plan:       Earl was seen today for low-back pain.    Diagnoses and all orders for this visit:    Isolated cervical dystonia  -     onabotulinumtoxina injection 200 Units; Inject 200 Units into the muscle one time.    Muscle spasm  -     onabotulinumtoxina injection 200 Units; Inject 200 Units into the muscle one time.    Intractable migraine with aura without status migrainosus  -     onabotulinumtoxina injection 200 Units; Inject 200 Units into the muscle one time.    We discussed with the patient the assessment and recommendations. The following is the plan we agreed on:    1.  Procedure note: Under clean technique, EMG guidance after discussing with the patient 200 units of Botox were used.  We injected the procerus in the midline.  We injected bilateral , frontalis, temporalis, splenius capitis, longissimus  and upper trapezius.  She tolerated procedure well.  2.  Schedule patient for the lumbar epidural steroid injection.  3.  Return as needed.  Otherwise follow-up in one month to see how the injection worked.      Tien June, DO  [facility]    I have personally taken the history and examined this patient and agree with the fellow's note as stated above.

## 2017-11-02 ENCOUNTER — TELEPHONE (OUTPATIENT)
Dept: PAIN MEDICINE | Facility: CLINIC | Age: 57
End: 2017-11-02

## 2017-11-02 NOTE — TELEPHONE ENCOUNTER
----- Message from Lisset Fraga sent at 11/2/2017  3:17 PM CDT -----  Contact: Patient  x_  1st Request  _  2nd Request  _  3rd Request    Who: MARTY SALDAÑA [[id number]]    Why: Patient is returning a call return.    What Number to Call Back:[phone]     When to Expect a call back: (Within 24 hours)    Please return the call at earliest convenience. Thanks!

## 2017-11-02 NOTE — TELEPHONE ENCOUNTER
Contacted and spoke to patient, she missed a call to scheduled her procedure, would like to be contacted at [phone] or [phone] [phone].

## 2017-11-02 NOTE — TELEPHONE ENCOUNTER
----- Message from Lisset Fraga sent at 11/2/2017 11:00 AM CDT -----  Contact: Patient  x_  1st Request  _  2nd Request  _  3rd Request    Who: MARTY SALDAÑA [[id number]]    Why: Patient would like to speak with staff in regards to scheduling an epidural appointment.    What Number to Call Back: [phone]    When to Expect a call back: (Within 24 hours)    Please return the call at earliest convenience. Thanks!

## 2017-11-02 NOTE — TELEPHONE ENCOUNTER
Contacted and left detailed message for patient informing her that if she was consented on yesterday and wasn't scheduled, the procedure schedulers will be contacting her within the next 48 hours to schedule her. If she has additional questions once she get the message she can call us back.

## 2017-11-20 ENCOUNTER — SURGERY (OUTPATIENT)
Age: 57
End: 2017-11-20

## 2017-11-20 ENCOUNTER — HOSPITAL ENCOUNTER (OUTPATIENT)
Facility: OTHER | Age: 57
Discharge: HOME | End: 2017-11-20
Attending: ANESTHESIOLOGY | Admitting: ANESTHESIOLOGY
Payer: COMMERCIAL

## 2017-11-20 VITALS
OXYGEN SATURATION: 95 % | DIASTOLIC BLOOD PRESSURE: 74 MMHG | HEIGHT: 66 IN | RESPIRATION RATE: 18 BRPM | TEMPERATURE: 99 F | HEART RATE: 77 BPM | WEIGHT: 146 LBS | BODY MASS INDEX: 23.46 KG/M2 | SYSTOLIC BLOOD PRESSURE: 158 MMHG

## 2017-11-20 DIAGNOSIS — M54.16 LUMBAR RADICULOPATHY: ICD-10-CM

## 2017-11-20 DIAGNOSIS — M47.816 SPONDYLOSIS OF LUMBAR REGION WITHOUT MYELOPATHY OR RADICULOPATHY: ICD-10-CM

## 2017-11-20 DIAGNOSIS — M47.26 OSTEOARTHRITIS OF SPINE WITH RADICULOPATHY, LUMBAR REGION: Primary | ICD-10-CM

## 2017-11-20 DIAGNOSIS — M47.816 DEGENERATIVE JOINT DISEASE (DJD) OF LUMBAR SPINE: ICD-10-CM

## 2017-11-20 PROCEDURE — 62323 NJX INTERLAMINAR LMBR/SAC: CPT | Mod: ,,, | Performed by: ANESTHESIOLOGY

## 2017-11-20 PROCEDURE — 62322 NJX INTERLAMINAR LMBR/SAC: CPT | Performed by: ANESTHESIOLOGY

## 2017-11-20 PROCEDURE — 25500020 PHARM REV CODE 255: Performed by: ANESTHESIOLOGY

## 2017-11-20 PROCEDURE — 63600175 PHARM REV CODE 636 W HCPCS: Performed by: ANESTHESIOLOGY

## 2017-11-20 PROCEDURE — 25000003 PHARM REV CODE 250: Performed by: ANESTHESIOLOGY

## 2017-11-20 PROCEDURE — 62323 NJX INTERLAMINAR LMBR/SAC: CPT | Performed by: ANESTHESIOLOGY

## 2017-11-20 RX ORDER — SODIUM CHLORIDE 9 MG/ML
500 INJECTION, SOLUTION INTRAVENOUS CONTINUOUS
Status: DISCONTINUED | OUTPATIENT
Start: 2017-11-20 | End: 2017-11-20 | Stop reason: HOSPADM

## 2017-11-20 RX ORDER — DEXAMETHASONE SODIUM PHOSPHATE 100 MG/10ML
INJECTION INTRAMUSCULAR; INTRAVENOUS
Status: DISCONTINUED | OUTPATIENT
Start: 2017-11-20 | End: 2017-11-20 | Stop reason: HOSPADM

## 2017-11-20 RX ORDER — LIDOCAINE HYDROCHLORIDE 10 MG/ML
INJECTION, SOLUTION EPIDURAL; INFILTRATION; INTRACAUDAL; PERINEURAL
Status: DISCONTINUED | OUTPATIENT
Start: 2017-11-20 | End: 2017-11-20 | Stop reason: HOSPADM

## 2017-11-20 RX ORDER — LIDOCAINE HYDROCHLORIDE 10 MG/ML
INJECTION INFILTRATION; PERINEURAL
Status: DISCONTINUED | OUTPATIENT
Start: 2017-11-20 | End: 2017-11-20 | Stop reason: HOSPADM

## 2017-11-20 RX ADMIN — IOHEXOL 50 ML: 300 INJECTION, SOLUTION INTRAVENOUS at 02:11

## 2017-11-20 RX ADMIN — LIDOCAINE HYDROCHLORIDE 10 ML: 10 INJECTION, SOLUTION INFILTRATION; PERINEURAL at 02:11

## 2017-11-20 RX ADMIN — DEXAMETHASONE SODIUM PHOSPHATE 10 MG: 10 INJECTION INTRAMUSCULAR; INTRAVENOUS at 02:11

## 2017-11-20 RX ADMIN — LIDOCAINE HYDROCHLORIDE 10 ML: 10 INJECTION, SOLUTION EPIDURAL; INFILTRATION; INTRACAUDAL; PERINEURAL at 02:11

## 2017-11-20 NOTE — OP NOTE
Lumbar Interlaminar Epidural Steroid Injection under Fluoroscopic Guidance.  Time-out taken to identify patient and procedure side prior to starting the procedure.   I attest that I have reviewed the patient's home medications prior to the procedure and no contraindication have been identified. I  re-evaluated the patient after the patient was positioned for the procedure in the procedure room immediately before the procedural time-out. The vital signs are current and represent the current state of the patient which has not significantly changed since the preprocedure assessment.                                                               Date of Service: 11/20/2017    PCP: Dorota Galvin MD    Referring Physician:    Procedure:  L4-L5 Interlaminar epidural steroid injection under fluoroscopic guidance.    Reasons for procedure: Osteoarthritis of spine with radiculopathy, lumbar region [M47.26]     Physician: Guillaume Rico MD  ASSISTANTS: Justin Caba MD, Resident, PGY2    Medications injected: Preservative-free dexamethasone 10mg with 4mL of sterile Xylocaine-MPF 1% and 1ml of sterile preservative-free normal saline.    Local anesthetic injected:    Xylocaine 1% 9ml with Sodium Bicarbonate 1ml.   Sedation Medications: None    Estimated blood loss:  none.    Complications:  none.    Technique:  With the patient laying in a prone position, the area was prepped and draped in the usual sterile fashion using ChloraPrep and a fenestrated drape.  Local anesthetic was given using a 27-gauge needle by raising a wheal and going down to the hub of the needle.  A 3.5 inch 20-gauge Touhy needle was introduced under fluoroscopic guidance.  It met the lamina of the posterior element. The needle was then hinged above the lamina.  Loss of resistance technique was employed while advancing the needle.  Once in the desired position, contrast dye Omnipaque was injected to confirm placement and there was no vascular runoff.   Digital subtraction was employed to confirm that there was no vascular runoff.  The medication was then injected slowly.  The patient tolerated the procedure well.      Pain before the procedure: 8/10    Pain after the procedure: 5/10    The patient was monitored after the procedure.   They were given post-procedure and discharge instructions to follow at home.  The patient was discharged in a stable condition.

## 2017-11-20 NOTE — PLAN OF CARE
Pt tolerated procedure well. Reports 5/10 pain after procedure. Pt assisted up for first time, steady on feet. D/c instructions given.

## 2017-11-20 NOTE — DISCHARGE INSTRUCTIONS

## 2017-12-19 ENCOUNTER — TELEPHONE (OUTPATIENT)
Dept: PAIN MEDICINE | Facility: CLINIC | Age: 57
End: 2017-12-19

## 2017-12-19 NOTE — TELEPHONE ENCOUNTER
Patient appointment canceled as provider will not be in clinic that day. Left voicemail directing patient to call clinic and reschedule.

## 2018-07-05 ENCOUNTER — HOSPITAL ENCOUNTER (EMERGENCY)
Facility: OTHER | Age: 58
Discharge: HOME | End: 2018-07-05
Attending: EMERGENCY MEDICINE
Payer: COMMERCIAL

## 2018-07-05 VITALS
DIASTOLIC BLOOD PRESSURE: 71 MMHG | OXYGEN SATURATION: 89 % | HEIGHT: 66 IN | TEMPERATURE: 98 F | WEIGHT: 170 LBS | HEART RATE: 116 BPM | BODY MASS INDEX: 27.32 KG/M2 | SYSTOLIC BLOOD PRESSURE: 151 MMHG | RESPIRATION RATE: 18 BRPM

## 2018-07-05 DIAGNOSIS — B34.9 ACUTE VIRAL SYNDROME: Primary | ICD-10-CM

## 2018-07-05 DIAGNOSIS — E86.0 DEHYDRATION: ICD-10-CM

## 2018-07-05 LAB
ALBUMIN SERPL BCP-MCNC: 4.4 G/DL
ALP SERPL-CCNC: 65 U/L
ALT SERPL W/O P-5'-P-CCNC: 60 U/L
ANION GAP SERPL CALC-SCNC: 17 MMOL/L
AST SERPL-CCNC: 71 U/L
BASOPHILS # BLD AUTO: 0.01 K/UL
BASOPHILS NFR BLD: 0.1 %
BILIRUB SERPL-MCNC: 0.7 MG/DL
BILIRUB UR QL STRIP: NEGATIVE
BUN SERPL-MCNC: 9 MG/DL
CALCIUM SERPL-MCNC: 9.2 MG/DL
CHLORIDE SERPL-SCNC: 101 MMOL/L
CLARITY UR: CLEAR
CO2 SERPL-SCNC: 22 MMOL/L
COLOR UR: YELLOW
CREAT SERPL-MCNC: 0.9 MG/DL
DIFFERENTIAL METHOD: ABNORMAL
EOSINOPHIL # BLD AUTO: 0 K/UL
EOSINOPHIL NFR BLD: 0.5 %
ERYTHROCYTE [DISTWIDTH] IN BLOOD BY AUTOMATED COUNT: 16.5 %
ERYTHROCYTE [SEDIMENTATION RATE] IN BLOOD: 13 MM/HR
EST. GFR  (AFRICAN AMERICAN): >60 ML/MIN/1.73 M^2
EST. GFR  (NON AFRICAN AMERICAN): >60 ML/MIN/1.73 M^2
GLUCOSE SERPL-MCNC: 92 MG/DL
GLUCOSE UR QL STRIP: NEGATIVE
HCT VFR BLD AUTO: 39.9 %
HGB BLD-MCNC: 12.7 G/DL
HGB UR QL STRIP: NEGATIVE
KETONES UR QL STRIP: NEGATIVE
LACTATE SERPL-SCNC: 2.4 MMOL/L
LACTATE SERPL-SCNC: 3.5 MMOL/L
LEUKOCYTE ESTERASE UR QL STRIP: NEGATIVE
LIPASE SERPL-CCNC: 15 U/L
LYMPHOCYTES # BLD AUTO: 4.4 K/UL
LYMPHOCYTES NFR BLD: 54.5 %
MAGNESIUM SERPL-MCNC: 2 MG/DL
MCH RBC QN AUTO: 31.5 PG
MCHC RBC AUTO-ENTMCNC: 31.8 G/DL
MCV RBC AUTO: 99 FL
MONOCYTES # BLD AUTO: 0.5 K/UL
MONOCYTES NFR BLD: 6.4 %
NEUTROPHILS # BLD AUTO: 3.1 K/UL
NEUTROPHILS NFR BLD: 38.4 %
NITRITE UR QL STRIP: NEGATIVE
PH UR STRIP: 6 [PH] (ref 5–8)
PHOSPHATE SERPL-MCNC: 2.7 MG/DL
PLATELET # BLD AUTO: 98 K/UL
PMV BLD AUTO: 9.4 FL
POTASSIUM SERPL-SCNC: 3.9 MMOL/L
PROT SERPL-MCNC: 7.5 G/DL
PROT UR QL STRIP: NEGATIVE
RBC # BLD AUTO: 4.03 M/UL
SODIUM SERPL-SCNC: 140 MMOL/L
SP GR UR STRIP: 1.01 (ref 1–1.03)
TSH SERPL DL<=0.005 MIU/L-ACNC: 1.4 UIU/ML
URN SPEC COLLECT METH UR: NORMAL
UROBILINOGEN UR STRIP-ACNC: NEGATIVE EU/DL
WBC # BLD AUTO: 7.98 K/UL

## 2018-07-05 PROCEDURE — 25000242 PHARM REV CODE 250 ALT 637 W/ HCPCS: Performed by: EMERGENCY MEDICINE

## 2018-07-05 PROCEDURE — 85651 RBC SED RATE NONAUTOMATED: CPT

## 2018-07-05 PROCEDURE — 96361 HYDRATE IV INFUSION ADD-ON: CPT

## 2018-07-05 PROCEDURE — 63600175 PHARM REV CODE 636 W HCPCS: Performed by: EMERGENCY MEDICINE

## 2018-07-05 PROCEDURE — 83735 ASSAY OF MAGNESIUM: CPT

## 2018-07-05 PROCEDURE — 83690 ASSAY OF LIPASE: CPT

## 2018-07-05 PROCEDURE — 80053 COMPREHEN METABOLIC PANEL: CPT

## 2018-07-05 PROCEDURE — 81003 URINALYSIS AUTO W/O SCOPE: CPT

## 2018-07-05 PROCEDURE — 94640 AIRWAY INHALATION TREATMENT: CPT

## 2018-07-05 PROCEDURE — 99284 EMERGENCY DEPT VISIT MOD MDM: CPT | Mod: 25

## 2018-07-05 PROCEDURE — 83605 ASSAY OF LACTIC ACID: CPT | Mod: 91

## 2018-07-05 PROCEDURE — 94761 N-INVAS EAR/PLS OXIMETRY MLT: CPT

## 2018-07-05 PROCEDURE — 85025 COMPLETE CBC W/AUTO DIFF WBC: CPT

## 2018-07-05 PROCEDURE — 84443 ASSAY THYROID STIM HORMONE: CPT

## 2018-07-05 PROCEDURE — 96374 THER/PROPH/DIAG INJ IV PUSH: CPT

## 2018-07-05 PROCEDURE — 84100 ASSAY OF PHOSPHORUS: CPT

## 2018-07-05 PROCEDURE — 25000003 PHARM REV CODE 250: Performed by: EMERGENCY MEDICINE

## 2018-07-05 RX ORDER — BENZONATATE 100 MG/1
100 CAPSULE ORAL 3 TIMES DAILY PRN
Qty: 20 CAPSULE | Refills: 0 | Status: SHIPPED | OUTPATIENT
Start: 2018-07-05 | End: 2018-07-15

## 2018-07-05 RX ORDER — PREDNISONE 20 MG/1
60 TABLET ORAL DAILY
Qty: 15 TABLET | Refills: 0 | Status: SHIPPED | OUTPATIENT
Start: 2018-07-05 | End: 2018-07-10

## 2018-07-05 RX ORDER — IPRATROPIUM BROMIDE AND ALBUTEROL SULFATE 2.5; .5 MG/3ML; MG/3ML
3 SOLUTION RESPIRATORY (INHALATION)
Status: COMPLETED | OUTPATIENT
Start: 2018-07-05 | End: 2018-07-05

## 2018-07-05 RX ORDER — IBUPROFEN 600 MG/1
600 TABLET ORAL EVERY 6 HOURS PRN
Qty: 20 TABLET | Refills: 0 | Status: ON HOLD | OUTPATIENT
Start: 2018-07-05 | End: 2019-10-28 | Stop reason: HOSPADM

## 2018-07-05 RX ORDER — FLUTICASONE PROPIONATE 50 MCG
1 SPRAY, SUSPENSION (ML) NASAL 2 TIMES DAILY PRN
Qty: 15 G | Refills: 0 | Status: SHIPPED | OUTPATIENT
Start: 2018-07-05 | End: 2019-10-09

## 2018-07-05 RX ADMIN — SODIUM CHLORIDE 2313 ML: 0.9 INJECTION, SOLUTION INTRAVENOUS at 04:07

## 2018-07-05 RX ADMIN — LORAZEPAM 1 MG: 2 INJECTION INTRAMUSCULAR; INTRAVENOUS at 03:07

## 2018-07-05 RX ADMIN — IPRATROPIUM BROMIDE AND ALBUTEROL SULFATE 3 ML: .5; 3 SOLUTION RESPIRATORY (INHALATION) at 01:07

## 2018-07-05 RX ADMIN — SODIUM CHLORIDE 1000 ML: 0.9 INJECTION, SOLUTION INTRAVENOUS at 02:07

## 2018-07-05 NOTE — ED NOTES
Pt with c/o back  And leg spasms. Pt  at bedside. Pt shaking with rapid breathing . Pt instructed to the slower respiration and remaining calmer but still with pain . Dr irvin informed.

## 2018-07-05 NOTE — ED NOTES
LOC: The patient is awake, alert and aware of environment with an appropriate affect, the patient is oriented x 3 and speaking appropriately.  APPEARANCE: Patient resting comfortably and in no acute distress, patient is clean and well groomed, patient's clothing is properly fastened.  SKIN: The skin is warm and dry, patient has normal skin turgor and moist mucus membranes, skin intact, no breakdown or brusing noted.  MUSKULOSKELETAL: Patient moving all extremities well, no obvious swelling or deformities noted.  RESPIRATORY: Airway is open and patent, respirations are spontaneous, patient has a normal effort and rate. Breath sounds are clear and equal bilaterally.  CARDIAC: Normal heart sounds. No peripheral edema.  ABDOMEN: Soft and non tender to palpation, no distention noted. Bowel sounds present.

## 2018-07-05 NOTE — ED PROVIDER NOTES
Encounter Date: 7/5/2018    SCRIBE #1 NOTE: I, Hemanth Chowdhury, am scribing for, and in the presence of, Dr. Rockwell.       History     Chief Complaint   Patient presents with    General Illness     cough, body aches, fever,  headache, and weakness x 3 days.      Time seen by provider: 1:15 PM    This is a 58 y.o. female who presents with complaint of productive cough that began approximately four days ago. She is also experiencing fever, fatigue, general weakness, shortness of breath and chest pain during coughing spells, myalgias, and rhinorhea. She was seen at an urgent care prior to arrival. She reports having a past medical history of alcoholism, fatty liver, mediastinoscopy, sarcoidosis, seizure, and erythema nodosum. She admits to occasionally drinking alcohol, smoking, and the use of marijuana.       The history is provided by the patient and the spouse.     Review of patient's allergies indicates:   Allergen Reactions    Fentanyl Other (See Comments)     Other reaction(s): Itching    Lortab  [hydrocodone-acetaminophen] Other (See Comments)    Phenothiazines      Past Medical History:   Diagnosis Date    Anemia     Anemia     Depression     Diverticulitis     Fatty liver     GERD (gastroesophageal reflux disease)     Hyperlipidemia     Hypertension     Pancreatitis     Peptic ulcer disease     Polysubstance abuse     Posterior reversible encephalopathy syndrome     Sarcoidosis of lung     Sarcoidosis of lung     over 30 yrs ago    Seizures     7/2017    Suicide attempt     Suicide ideation      Past Surgical History:   Procedure Laterality Date    APPENDECTOMY      COLONOSCOPY N/A 7/28/2017    Procedure: COLONOSCOPY;  Surgeon: Aaron Alvarado MD;  Location: [facility];  Service: Endoscopy;  Laterality: N/A;    ESOPHAGOGASTRODUODENOSCOPY  10/7/2016, 11/6/2014    2016 - gastritis, duodenitis, 2014 erosive gastritis    FLEXIBLE SIGMOIDOSCOPY  11/06/2014    colitis    HYSTERECTOMY       mediastenoscopy      TONSILLECTOMY N/A 1970    TUBAL LIGATION       Family History   Problem Relation Age of Onset    Heart attack Father     Diabetes Father     Hypertension Father     Diabetes Mother     Hypertension Mother     Breast cancer Maternal Aunt     Colon cancer Maternal Uncle     Breast cancer Daughter     Ovarian cancer Neg Hx      Social History   Substance Use Topics    Smoking status: Current Every Day Smoker     Packs/day: 1.00     Years: 30.00     Types: Cigarettes    Smokeless tobacco: Never Used    Alcohol use No     Review of Systems   Constitutional: Positive for appetite change, fatigue and fever. Negative for chills.   HENT: Positive for rhinorrhea. Negative for congestion, sore throat and trouble swallowing.    Eyes: Negative for pain.   Respiratory: Positive for cough and shortness of breath. Negative for chest tightness.    Cardiovascular: Positive for chest pain.   Gastrointestinal: Negative for abdominal pain, nausea and vomiting.   Endocrine: Negative for polydipsia and polyuria.   Genitourinary: Negative for dysuria and flank pain.   Musculoskeletal: Positive for myalgias. Negative for back pain and neck pain.   Skin: Negative for rash.   Neurological: Positive for weakness and headaches. Negative for syncope and light-headedness.   Psychiatric/Behavioral: Negative for confusion.       Physical Exam     Initial Vitals [07/05/18 1246]   BP Pulse Resp Temp SpO2   133/65 98 18 98 °F (36.7 °C) (!) 92 %      MAP       --             Physical Exam    Nursing note and vitals reviewed.  Constitutional: She appears well-developed and well-nourished. She is not diaphoretic. No distress.   HENT:   Head: Normocephalic and atraumatic.   Right Ear: External ear normal.   Left Ear: External ear normal.   Mouth/Throat: Posterior oropharyngeal erythema present.   Erythema to the posterior oropharynx. Without tonsillar exudate or deviated uvula.   Eyes: EOM are normal. Pupils are equal,  round, and reactive to light. Right eye exhibits no discharge. Left eye exhibits no discharge.   Neck: Normal range of motion.   Cardiovascular: Normal rate, regular rhythm and normal heart sounds. Exam reveals no gallop and no friction rub.    No murmur heard.  Pulmonary/Chest: No respiratory distress. She has no wheezes. She has rhonchi. She has no rales.   Occasional scattered rhonchi.   Abdominal: Soft. There is no tenderness. There is no rebound and no guarding.   Musculoskeletal: Normal range of motion. She exhibits no edema or tenderness.   Neurological: She is alert and oriented to person, place, and time.   Skin: Skin is warm and dry. No rash and no abscess noted. No erythema. No pallor.   Psychiatric: She has a normal mood and affect. Her behavior is normal. Judgment and thought content normal.         ED Course   Procedures  Labs Reviewed   CBC W/ AUTO DIFFERENTIAL - Abnormal; Notable for the following:        Result Value    MCV 99 (*)     MCH 31.5 (*)     MCHC 31.8 (*)     RDW 16.5 (*)     Platelets 98 (*)     Lymph% 54.5 (*)     All other components within normal limits   COMPREHENSIVE METABOLIC PANEL - Abnormal; Notable for the following:     CO2 22 (*)     AST 71 (*)     ALT 60 (*)     Anion Gap 17 (*)     All other components within normal limits   LACTIC ACID, PLASMA - Abnormal; Notable for the following:     Lactate (Lactic Acid) 3.5 (*)     All other components within normal limits    Narrative:     LA critical result(s) called and verbal readback obtained from Key Yang RN. , 07/05/2018 15:41   LACTIC ACID, PLASMA - Abnormal; Notable for the following:     Lactate (Lactic Acid) 2.4 (*)     All other components within normal limits   URINALYSIS   TSH   LIPASE   MAGNESIUM   PHOSPHORUS   SEDIMENTATION RATE          Imaging Results          X-Ray Chest AP Portable (Final result)  Result time 07/05/18 14:59:53   Procedure changed from X-Ray Chest PA And Lateral     Final result by Puma Elias,  MD (07/05/18 14:59:53)                 Impression:      No acute abnormality.      Electronically signed by: Puma Elias MD  Date:    07/05/2018  Time:    14:59             Narrative:    EXAMINATION:  XR CHEST AP PORTABLE    CLINICAL HISTORY:  cough;    TECHNIQUE:  Single frontal portable view of the chest was performed.    COMPARISON:  Chest radiograph 09/29/2016    FINDINGS:  Support devices: None    Mild bibasilar subsegmental atelectasis is unchanged.  The lungs are otherwise clear, with normal appearance of pulmonary vasculature and no pleural effusion or pneumothorax.    The cardiac silhouette is normal in size. The hilar and mediastinal contours are unremarkable.    Bones are intact.                              X-Rays:   Independently Interpreted Readings:   Chest X-Ray: Mild interstitial prominence of the right lower lobe with hilar adenopathy. No lobar infiltrate, effusions, or pneumothorax.     Medical Decision Making:   Clinical Tests:   Lab Tests: Ordered and Reviewed  Radiological Study: Ordered and Reviewed  ED Management:  Emergent evaluation of 58-year-old female alcoholic who presents with complaint of general fatigue and URI type symptoms. Vital signs reveal mild diminished pulse oximetry, afebrile.  Physical exam revealed some pharyngitis but no convincing evidence for streptococcal etiology.  There was no meningismus.  She did have scattered rhonchi and I was suspicious for pneumonia, however chest x-ray is clear.  She was given a DuoNeb and had of paradoxical reaction with shaking and palpitations.  This was treated with Ativan which then caused her to become drowsy and require supplemental oxygen.  Initial labs are reassuring with no leukocytosis or profound anemia or major electrolyte disturbance, but there was significant elevation in lactic acid.  Nursing staff felt this was due to prolonged tourniquet time.  Patient was treated aggressively with IV fluids with marked improvement in  symptoms, and repeat lactic acid was only minimally elevated above normal. Given her otherwise benign workup and improvement in symptoms, desired to go home, I will discharge her home.  She states she can follow up with her doctor tomorrow oral return for new or worsening symptoms. I would consider that there could be an element of her sarcoidosis contributing to symptoms, will prescribed a steroid burst.  Additional prescriptions include Tessalon for cough, ibuprofen for pain or body aches, and Flonase for her rhinitis.  I have encouraged her to follow-up or return, and given return precautions.            Scribe Attestation:   Scribe #1: I performed the above scribed service and the documentation accurately describes the services I performed. I attest to the accuracy of the note.    Attending Attestation:           Physician Attestation for Scribe:  Physician Attestation Statement for Scribe #1: I, Dr. Rockwell, reviewed documentation, as scribed by Hemanth Chowdhury in my presence, and it is both accurate and complete.                    Clinical Impression:     1. Acute viral syndrome    2. Dehydration                                 Donna Rockwell MD  07/05/18 1919

## 2018-10-05 DIAGNOSIS — M54.50 LUMBAR BACK PAIN: Primary | ICD-10-CM

## 2018-11-14 ENCOUNTER — CLINICAL SUPPORT (OUTPATIENT)
Dept: REHABILITATION | Facility: OTHER | Age: 58
End: 2018-11-14
Attending: FAMILY MEDICINE
Payer: COMMERCIAL

## 2018-11-14 DIAGNOSIS — M53.86 DECREASED ROM OF LUMBAR SPINE: ICD-10-CM

## 2018-11-14 DIAGNOSIS — R53.1 WEAKNESS: ICD-10-CM

## 2018-11-14 PROCEDURE — 97110 THERAPEUTIC EXERCISES: CPT

## 2018-11-14 PROCEDURE — 97161 PT EVAL LOW COMPLEX 20 MIN: CPT

## 2018-11-14 PROCEDURE — G8991 OTHER PT/OT GOAL STATUS: HCPCS | Mod: CK

## 2018-11-14 PROCEDURE — G8987 SELF CARE CURRENT STATUS: HCPCS | Mod: CL

## 2018-11-14 NOTE — PROGRESS NOTES
[facility] - PHYSICAL THERAPY EVALUATION     Name: Earl Abdul  [facility] Number: [id number]      Diagnosis:   Encounter Diagnoses   Name Primary?    Decreased ROM of lumbar spine     Weakness         Medical Diagnosis:  M54.5 (ICD-10-CM) - Lumbar back pain    Physician: Izzy Garcia MD     Treatment Orders: PT Eval and Treat    Past Medical History:   Diagnosis Date    Anemia     Anemia     Depression     Diverticulitis     Fatty liver     GERD (gastroesophageal reflux disease)     Hyperlipidemia     Hypertension     Pancreatitis     Peptic ulcer disease     Polysubstance abuse     Posterior reversible encephalopathy syndrome     Sarcoidosis of lung     Sarcoidosis of lung     over 30 yrs ago    Seizures     7/2017    Suicide attempt     Suicide ideation      Current Outpatient Medications   Medication Sig    B.ANI/L.ACI/L.SAL/L.PLAN/L.GENO (PROBIOTIC FORMULA ORAL) Take 1 tablet by mouth once daily.     baclofen (LIORESAL) 10 MG tablet Take 10 mg by mouth 3 (three) times daily as needed (for spasms).     busPIRone (BUSPAR) 15 MG tablet Take 1 tablet by mouth 3 (three) times daily.     desvenlafaxine (PRISTIQ) 100 MG 24 hr tablet Take 1 tablet by mouth once daily.     dexlansoprazole (DEXILANT) 60 mg capsule Take 60 mg by mouth once daily.    dicyclomine (BENTYL) 10 MG capsule Take 10 mg by mouth 4 (four) times daily.    fluticasone (FLONASE) 50 mcg/actuation nasal spray 1 spray (50 mcg total) by Each Nare route 2 (two) times daily as needed for Rhinitis.    gabapentin (NEURONTIN) 300 MG capsule take 1 capsule by mouth four times a day    ibuprofen (ADVIL,MOTRIN) 600 MG tablet Take 1 tablet (600 mg total) by mouth every 6 (six) hours as needed for Pain (or fever).    lorazepam (ATIVAN) 1 MG tablet Take 1 mg by mouth 2 (two) times daily as needed for Anxiety.     ondansetron (ZOFRAN ODT) 8 MG TbDL Take 8 mg by mouth 3 (three) times daily as needed (for nausea and  "vomiting).    pantoprazole (PROTONIX) 40 MG tablet Take 40 mg by mouth once daily.    sumatriptan (IMITREX) 50 MG tablet Take 1 tablet (50 mg total) by mouth every 8 (eight) hours as needed for Migraine.    trazodone (DESYREL) 100 MG tablet Take 300 mg by mouth every evening.     UNABLE TO FIND medication name: NEURO-MAG Take 3 capsules by mouth once daily    UNABLE TO FIND medication name: Sleep Reset - Take 1 packet by mouth once daily     No current facility-administered medications for this visit.      Review of patient's allergies indicates:   Allergen Reactions    Fentanyl Other (See Comments)     Other reaction(s): Itching    Lortab  [hydrocodone-acetaminophen] Other (See Comments)    Phenothiazines        Precautions: Fibromyalgia, Hx of closed dislocation of ankle; seizure;      Pattern of pain determined: 1 PEN    Evaluation Date: 11/14/2018  Authorization Period Expiration: 12/31/18  Plan of Care Expiration: 11/14/18 to 2/14/19  Reassessment Due: 12/14/18  Visit # / Visits authorized: 1/ 20    Time In: 1405  Time Out: 1530  Total Billable Time: 85 minutes     HISTORY     History of Present Illness:   Patient reports "I have bad memory". Patient reports "I had two epidural, they didn't work". She has pain constantly at low back, R hip, and lower legs (started this week). Sleeping is an issue. Patient has been seeing an accupunturist but has not help recently. Lower back is most affected, then R hip, then between shoulder blades, then neck (worst to least painful). Patient does have radiating pain mostly to the R LE. She reports everything is difficult and painful; bending forward, prolonged walking, sitting, cooking, bath, dressing. Patient had a long road trip last week 6 hours and forward (near [place]). Laying down and off her feet makes pain better, along with aleve and flexiril. Pain is worst in the morning. "Every night when I lay down I feel like I have nathaniel horses at the bottom of my " "feet". She recently had a MRI of her lumbar spine, which she saw neuroSx who did not recommend Sx. She reports R knee pain at MCL/medial patella region. "I am miserable, and I don't wana do anything because I am hurting". She sometimes goes to the mall to walk for about two hours slowly, just to get some exercise.  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 Patient was bitten a brown Reclus spider a month ago at the back of the neck, it was pretty painful but getting better now. Patient has had diarrhea for the past 3 days. (Hx of C-diff Oct 2017).     MD's Visit Note:   Lumbar Interlaminar Epidural Steroid Injection under Fluoroscopic Guidance.  Time-out taken to identify patient and procedure side prior to starting the procedure.   I attest that I have reviewed the patient's home medications prior to the procedure and no contraindication have been identified. I  re-evaluated the patient after the patient was positioned for the procedure in the procedure room immediately before the procedural time-out. The vital signs are current and represent the current state of the patient which has not significantly changed since the preprocedure assessment.                                                          Date of Service: 11/20/2017  PCP: Dorota Galvin MD  Referring Physician:  PROCEDURE:  L4-L5 Interlaminar epidural steroid injection under fluoroscopic guidance.  REASONS FOR PROCEDURE: Osteoarthritis of spine with radiculopathy, lumbar region [M47.26]   PHYSICIAN: Guillaume Rico MD  ASSISTANTS: Justin Caba MD, Resident, PGY2    Diagnostic Tests: From EPIC   MRI Lumbar spine  Narrative    HISTORY: lumbar radiculopathy     TECHNIQUE: Multiplanar, multisequence MR images were acquired from the thoracolumbar junction to the sacrum without the administration of contrast.      COMPARISON: Lumbar spine radiographs 09/07/17.  CT of the abdomen and pelvis 07/20/17. "  MRI lumbar spine 10/06/09.    FINDINGS:   Alignment: Mild grade 1 retrolisthesis of 4 mm L2-L3, 2 mm L3-L4, and 4 mm L4-L5.    Vertebrae: Focal edema signal is partially imaged along the right L3 transverse process.  Small hemangioma is present in the left portion of the L5 vertebral body.  Marrow signal is otherwise normal.    Discs:  Mild disc space narrowing and desiccation throughout most pronounced at L2-L3 and L4-L5.    Cord: Normal. Conus terminates at L1-L2.    Degenerative findings:        T11-L1: Incompletely imaged.  No more than a mild disc bulge.  No obvious spinal canal or foraminal stenosis.       L1-L2: No significant disc disease.  No spinal canal stenosis.  No neural foraminal stenosis.       L2-L3: Aforementioned retrolisthesis with minimal circumferential disc bulge and mild bilateral facet arthropathy.  No resultant compressive phenomena.       L3-L4: Aforementioned minimal retrolisthesis with minimal circumferential disc bulge and mild bilateral facet arthropathy results in at most mild left foraminal stenosis.  No spinal canal stenosis.       L4-L5: Aforementioned mild retrolisthesis with mild disc bulge eccentric to the right and mild bilateral facet arthropathy resulting in mild right foraminal stenosis without spinal canal stenosis.       L5-S1: No significant disc disease.  Mild bilateral facet arthropathy. No central canal stenosis.  No neural foraminal stenosis.    Paraspinal muscles & soft tissues: Normal.  Impression    Minimal to mild step wise retrolisthesis L2-L5 with mild disc and facet degeneration of the lumbar spine resulting in no more than mild foraminal stenoses.  Partially imaged focal edema signal along the right L3 transverse process may relate to recent fracture. Correlation is recommended with history and point tenderness.    MRI Cervical Spine  Narrative    HISTORY: Radiculopathy.    TECHNIQUE: Multiplanar, multisequence MR images of the cervical spine were acquired  without  intravenous contrast.    COMPARISON: Cervical spine radiographs 09/07/17.  CT cervical spine 06/13/12    FINDINGS:     Alignment: Normal.    Vertebrae: Normal marrow signal. No fracture.    Discs:  Mild disc space narrowing and desiccation throughout the cervical spine, most pronounced at C5-C6.    Cord: Normal.    Skull base and craniocervical junction: Normal.  Degenerative findings:        C2-C3: Minimal left paracentral disc osteophyte complex and left greater than right facet arthropathy.  No spinal canal stenosis.  No neural foraminal stenosis.       C3-C4: Circumferential disc osteophyte complex with minimal bilateral uncovertebral hypertrophy and mild left greater than right facet arthropathy results in mild right and moderate left foraminal stenosis.  No spinal canal stenosis.        C4-C5: Minimal circumferential disc osteophyte complex and mild bilateral uncovertebral hypertrophy without significant spinal canal or foraminal stenosis.        C5-C6: Circumferential disc osteophyte complex with mild bilateral uncovertebral hypertrophy and minimal left facet arthropathy results in mild left foraminal stenosis.  No spinal canal stenosis.       C6-C7: Mild circumferential disc osteophyte complex.  No spinal canal stenosis.  No neural foraminal stenosis.       C7-T1: No significant disc disease.  No spinal canal stenosis.  No neural foraminal stenosis.        T1-T3: Incompletely imaged.  No obvious disc bulge, spinal canal stenosis, or foraminal stenosis.     Paraspinal muscles & soft tissues: Normal.  Impression    Mild disc/endplate and facet degeneration in the cervical spine, most pronounced at C3-C4 and C5-C6 with up to moderate foraminal stenosis.    Pain Scale: Earl rates pain on a scale of 0-10 to be 10 at worst; 8 currently; 6 at best using VAS.     Pain location: Low back and R hip.    Aggravating factors:   Easing Factors: Hot baths, aleve, flexiril.  Disturbed Sleep:  Yes    Pattern of  "pain questions:  1.  Where is your pain the worst? Low back and R hip.  2.  Is your pain constant or intermittent? Constent  3.  Does bending forward make your typical pain worse? Yes  4.  Since the start of your back pain, has there been a change in your bowel or bladder? Yes, poor control at times.   5.  What can't you do now that you use to be able to do? Everything    Prior Treatment: This program before; and has helped  Prior functional status: Unlimited/manaeable  DME owned/used: none  Occupation:  /artist/teacher   Leisure: Reading, paint, walking, watching movies; I would like to go out and do things.                     Pts goals:  Functional normally; exercise; and decrease pain.     Red Flag Screening:   Cough  Sneeze  Strain: (+)  Bladder/ bowel: (+)  Falls: (+)  Night pain: (+)  Unexplained weight loss: (--)  General health: "Good"    OBJECTIVE     Postural examination/scapula alignment: Rounded shoulder, Head forward and Slouched posture; changes positions frequently when sitting due to pain. R sided hump w/ fwd flexion, appears as scoliosis.   Observation: Tremors, shaking hands,   Skin integrity: Spider bit on the neck. All other normal.   Edema: None  Correction of posture: better with lumbar roll, with slim line roll.     MOVEMENT LOSS    ROM Loss   Flexion moderate loss   Extension major loss   Side bending Right major loss   Side bending Left major loss   Rotation Right moderate loss   Rotation Left moderate loss     Gross Lower Extremity Strength:  4/5  5x STS for functional legs strength: 19 seconds with mod difficulty, but no increased LBP.     GAIT:  Assistive Device used: none  Level of Assistance: independent  Patient displays the following gait deviations:  No significant deviations observed.     Special Tests:   Test Name  Test Result   Prone Instability Test (+)   SI Joint Provocation Test (+) LBP   Straight Leg Raise (+) R   Neural Tension Test (+)R   Crossed Straight Leg Raise " DNA   Walking on toes (--)   Walking on heels  (--)     Positive Av at R (R hip); positive at L (low back center)  Positive Piriformis at R.  Positive SLR at R with shooting pain    NEUROLOGICAL SCREENING     Sensory deficit: Diminished B at L 1/L 2 at hips; all other normal    Reflexes:    Left Right   Patella Tendon 2+ 2+   Achilles Tendon 1+ 1+   Babinski  (--) (--)   Clonus (--) (--)     REPEATED TEST MOVEMENTS:  Repeated Flexion in Standing worse   Repeated Extension in Standing worse   Repeated Flexion in lying better   Repeated Extension in lying  worse       STATIC TESTS   Sitting slouched  no effect   Sitting erect better with roll   Standing slouched no effect   Standing erect  worse   Lying prone in extension  worse   Long sitting   DNA       Baseline Isometric Testing on Med X equipment: Testing administered by PT  Date of testin18  ROM 6-36 deg   Max Peak Torque 45    Min Peak Torque 17    Flex/Ext Ratio 2.64:1   % below normative data 69   Counter weight 54   femur 5   Seat pad 0       CMS Impairment/Limitation/Restriction for FOTO Lumbar Spine Survey    Therapist reviewed FOTO scores for Earl Abdul on 2018.   FOTO documents entered into [employer] - see Media section.    Limitation Score: 69%  Category: Selfcare/Other    Current : CL = least 60% but < 80% impaired, limited or restricted  Goal: CK = at least 40% but < 60% impaired, limited or restricted 55%  Discharge:        CMS Impairment/Limitation/Restriction for FOTO Lumbar Spine Survey  Status Limitation G-Code CMS Severity Modifier  Intake 31% 69% Current Status CL - At least 60 percent but less than 80 percent  Predicted 45% 55% Goal Status+ CK - At least 40 percent but less than 60 percent  D/C Status CL **only report if this is a one time visit    Treatment       PT Evaluation Completed? Yes  Discussed Plan of Care with patient: Yes      Home Exercise Program as follows:   Handouts were given to the patient. Pt demo good  understanding of the education provided. Earl demonstrated good return demonstration of activities.     - Patient received education regarding proper posture and body mechanics.  Patient was given top 10 tips handout which discusses posture seated, standing, lifting correctly, components of exercise, importance of nutrition and hydration, and importance of sleep.  - Tor roll tried, recommended, and purchase information was provided.    - Patient received a handout regarding anticipated muscular soreness following the isometric test and strategies for management were reviewed with patient including stretching, using ice and scheduled rest.       Pt was instructed in and performed the following:   Cardiovascular exercise and therapeutic exercise to improve posture, lumbar/cervical ROM, strength, and muscular endurance as follows:     Earl received therapeutic exercises to develop/improve posture, lumbar/cervical ROM, strength and muscular endurance for 25 minutes including the following exercises:     [facility] 11/14/2018   Visit Number 1   VAS Pain Rating 8   Flexion in Lying 10   Manual Therapy 6   Lumbar Extension Seat Pad 0   Femur Restraint 5   Top Dead Center 24   Counterweight 54   Lumbar Flexion 36   Lumbar Extension 6   Lumbar Peak Torque 45   Min Torque 17   Test Percent Below Normative Data 69   Lumbar Weight 25   Ice - Z Lie (in min.) 10     HEP:  DKTC with ball  LTR with Ball  Walking at least 3x/day for 30 minutes or more as tolerated.     Earl received the following manual therapy techniques: 6 minutes.   STM/MFR with Beck to Lumbosacral region in prone.    Assessment     This is a 58 y.o. female referred to [facility] and presents with a medical diagnosis of Lumbar back pain and with multiple co-morbidities including liver disease (see red flags list above). She demonstrates limitations as described below in the problem list. Patient presents with deficits with Lumbar  spine AROM, core/back weakness, and B LE's weakness (MMT 4/5 and 5x STS for functional leg strength at 19 seconds). Patient displayed balance disturbance with LOB during transfers to treatment table. Patient displayed tremors/shaking whole body at rest and with movements. She was in a lot of pain today 8/10, but reported some relief with manual therapy using Beck. She displayed signs and symptoms of R sided sciatica per testing.  Patient responded well to BETTY and LTR with ball.  Patient displayed significant back extensor weakness with medX test, displaying results at 69% below normative data. Peak torque was assessed at 45 ft-lbs and min torque at 17 ft-lbs. Pt rehab potential is Good given that patient completed this program before.     Pain Pattern: 1 PEN     Patient received education on the [facility] program, purpose of the isometric test, progression of back strengthening as well as wellness approach and systemic strengthening.  Details of the program were discussed.  Reviewed that patient should feel support/pressure from med ex restraints but no pain or discomfort and patient expressed understanding.    Based on the above history and physical examination an active physical therapy program is recommended.  Pt will continue to benefit from skilled outpatient physical therapy to address the deficits listed below in the chart, provide pt/family education and to maximize pt's level of independence in the home and community environment. .     No environmental, cultural, spiritual, developmental or education needs expressed or noted    Medical necessity is demonstrated by the following problem list.    Pt presents with the following impairments:     History  Co-morbidities and personal factors that may impact the plan of care Co-morbidities:   Age and multiple co-morbidities including seizure, RLS,  Liver disease    Personal Factors:   age  lifestyle     low   Examination  Body Structures and Functions,  activity limitations and participation restrictions that may impact the plan of care Body Regions:   back  lower extremities  trunk    Body Systems:    ROM  strength  balance  gait  transfers  transitions    Participation Restrictions:   Pain level    Activity limitations:   Learning and applying knowledge  no deficits    General Tasks and Commands  no deficits    Communication  no deficits    Mobility  lifting and carrying objects  walking  driving (bike, car, motorcycle)    Self care  washing oneself (bathing, drying, washing hands)  caring for body parts (brushing teeth, shaving, grooming)  looking after one's health    Domestic Life  shopping  cooking  doing house work (cleaning house, washing dishes, laundry)  assisting others    Interactions/Relationships  no deficits    Life Areas  no deficits    Community and Social Life  recreation and leisure         moderate   Clinical Presentation stable and uncomplicated low   Decision Making/ Complexity Score: low       GOALS: Pt is in agreement with the following goals.    Short term goals:  6 weeks or 10 visits   1.  Pt will demonstrate increased lumbar ROM by at least 3 degrees from the initial ROM value with improvements noted in functional ROM and ability to perform ADLs  2.  Pt will demonstrate increased maximum isometric torque value by 10% when compared to the initial value resulting in improved ability to perform bending, lifting, and carrying activities safely, confidently.    3.  Patient report a reduction in worst pain score by 1-2 points for improved tolerance during work and recreational activities  4.  Pt able to perform HEP correctly with minimal cueing or supervision for therapist      Long term goals: 13 weeks or 20 visits   1. Pt will demonstrate increased lumbar ROM by at least 6 degrees from initial ROM value, resulting in improved ability to perform functional fwd bending while standing and sitting.   2. Pt will demonstrate increased maximum  "isometric torque value by 20% when compared to the initial value resulting in improved ability to perform bending, lifting, and carrying activities safely, confidently.  3. Pt to demonstrate ability to independently control and reduce their pain through posture positioning and mechanical movements throughout a typical day.  4.  Patient will demonstrate improved overall function per FOTO Survey to CK = at least 40% but < 60% impaired, limited or restricted score or less.  5. Patient will improved 5x STS test for functional leg strength to 12 seconds or lower, in order to demonstrate improved legs strength (w/in age norm).    Plan   Outpatient physical therapy 2x week for 13 weeks or 20 visits to include the following:   - Patient education  - Therapeutic exercise  - Manual therapy  - Performance testing   - Neuromuscular Re-education  - Therapeutic activity   - Modalities    Pt may be seen by PTA as part of the rehabilitation team.     Therapist: John Maldonado, PT  11/14/2018    "I certify the need for these services furnished under this plan of treatment and while under my care."    ____________________________________  Physician/Referring Practitioner    _______________  Date of Signature        "

## 2018-11-14 NOTE — PLAN OF CARE
[facility] - PHYSICAL THERAPY EVALUATION     Name: Earl Abdul  [facility] Number: [id number]      Diagnosis:   Encounter Diagnoses   Name Primary?    Decreased ROM of lumbar spine     Weakness         Medical Diagnosis:  M54.5 (ICD-10-CM) - Lumbar back pain    Physician: Izzy Garcia MD     Treatment Orders: PT Eval and Treat    Past Medical History:   Diagnosis Date    Anemia     Anemia     Depression     Diverticulitis     Fatty liver     GERD (gastroesophageal reflux disease)     Hyperlipidemia     Hypertension     Pancreatitis     Peptic ulcer disease     Polysubstance abuse     Posterior reversible encephalopathy syndrome     Sarcoidosis of lung     Sarcoidosis of lung     over 30 yrs ago    Seizures     7/2017    Suicide attempt     Suicide ideation      Current Outpatient Medications   Medication Sig    B.ANI/L.ACI/L.SAL/L.PLAN/L.GENO (PROBIOTIC FORMULA ORAL) Take 1 tablet by mouth once daily.     baclofen (LIORESAL) 10 MG tablet Take 10 mg by mouth 3 (three) times daily as needed (for spasms).     busPIRone (BUSPAR) 15 MG tablet Take 1 tablet by mouth 3 (three) times daily.     desvenlafaxine (PRISTIQ) 100 MG 24 hr tablet Take 1 tablet by mouth once daily.     dexlansoprazole (DEXILANT) 60 mg capsule Take 60 mg by mouth once daily.    dicyclomine (BENTYL) 10 MG capsule Take 10 mg by mouth 4 (four) times daily.    fluticasone (FLONASE) 50 mcg/actuation nasal spray 1 spray (50 mcg total) by Each Nare route 2 (two) times daily as needed for Rhinitis.    gabapentin (NEURONTIN) 300 MG capsule take 1 capsule by mouth four times a day    ibuprofen (ADVIL,MOTRIN) 600 MG tablet Take 1 tablet (600 mg total) by mouth every 6 (six) hours as needed for Pain (or fever).    lorazepam (ATIVAN) 1 MG tablet Take 1 mg by mouth 2 (two) times daily as needed for Anxiety.     ondansetron (ZOFRAN ODT) 8 MG TbDL Take 8 mg by mouth 3 (three) times daily as needed (for nausea and  "vomiting).    pantoprazole (PROTONIX) 40 MG tablet Take 40 mg by mouth once daily.    sumatriptan (IMITREX) 50 MG tablet Take 1 tablet (50 mg total) by mouth every 8 (eight) hours as needed for Migraine.    trazodone (DESYREL) 100 MG tablet Take 300 mg by mouth every evening.     UNABLE TO FIND medication name: NEURO-MAG Take 3 capsules by mouth once daily    UNABLE TO FIND medication name: Sleep Reset - Take 1 packet by mouth once daily     No current facility-administered medications for this visit.      Review of patient's allergies indicates:   Allergen Reactions    Fentanyl Other (See Comments)     Other reaction(s): Itching    Lortab  [hydrocodone-acetaminophen] Other (See Comments)    Phenothiazines        Precautions: Fibromyalgia, Hx of closed dislocation of ankle; seizure;      Pattern of pain determined: 1 PEN    Evaluation Date: 11/14/2018  Authorization Period Expiration: 12/31/18  Plan of Care Expiration: 11/14/18 to 2/14/19  Reassessment Due: 12/14/18  Visit # / Visits authorized: 1/ 20    Time In: 1405  Time Out: 1530  Total Billable Time: 85 minutes     HISTORY     History of Present Illness:   Patient reports "I have bad memory". Patient reports "I had two epidural, they didn't work". She has pain constantly at low back, R hip, and lower legs (started this week). Sleeping is an issue. Patient has been seeing an accupunturist but has not help recently. Lower back is most affected, then R hip, then between shoulder blades, then neck (worst to least painful). Patient does have radiating pain mostly to the R LE. She reports everything is difficult and painful; bending forward, prolonged walking, sitting, cooking, bath, dressing. Patient had a long road trip last week 6 hours and forward (near [place]). Laying down and off her feet makes pain better, along with aleve and flexiril. Pain is worst in the morning. "Every night when I lay down I feel like I have nathaniel horses at the bottom of my " "feet". She recently had a MRI of her lumbar spine, which she saw neuroSx who did not recommend Sx. She reports R knee pain at MCL/medial patella region. "I am miserable, and I don't wana do anything because I am hurting". She sometimes goes to the mall to walk for about two hours slowly, just to get some exercise.  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 Patient was bitten a brown Reclus spider a month ago at the back of the neck, it was pretty painful but getting better now. Patient has had diarrhea for the past 3 days. (Hx of C-diff Oct 2017).     MD's Visit Note:   Lumbar Interlaminar Epidural Steroid Injection under Fluoroscopic Guidance.  Time-out taken to identify patient and procedure side prior to starting the procedure.   I attest that I have reviewed the patient's home medications prior to the procedure and no contraindication have been identified. I  re-evaluated the patient after the patient was positioned for the procedure in the procedure room immediately before the procedural time-out. The vital signs are current and represent the current state of the patient which has not significantly changed since the preprocedure assessment.                                                          Date of Service: 11/20/2017  PCP: Dorota Galvin MD  Referring Physician:  PROCEDURE:  L4-L5 Interlaminar epidural steroid injection under fluoroscopic guidance.  REASONS FOR PROCEDURE: Osteoarthritis of spine with radiculopathy, lumbar region [M47.26]   PHYSICIAN: Guillaume Rico MD  ASSISTANTS: Justin Caba MD, Resident, PGY2    Diagnostic Tests: From EPIC   MRI Lumbar spine  Narrative    HISTORY: lumbar radiculopathy     TECHNIQUE: Multiplanar, multisequence MR images were acquired from the thoracolumbar junction to the sacrum without the administration of contrast.      COMPARISON: Lumbar spine radiographs 09/07/17.  CT of the abdomen and pelvis 07/20/17. "  MRI lumbar spine 10/06/09.    FINDINGS:   Alignment: Mild grade 1 retrolisthesis of 4 mm L2-L3, 2 mm L3-L4, and 4 mm L4-L5.    Vertebrae: Focal edema signal is partially imaged along the right L3 transverse process.  Small hemangioma is present in the left portion of the L5 vertebral body.  Marrow signal is otherwise normal.    Discs:  Mild disc space narrowing and desiccation throughout most pronounced at L2-L3 and L4-L5.    Cord: Normal. Conus terminates at L1-L2.    Degenerative findings:        T11-L1: Incompletely imaged.  No more than a mild disc bulge.  No obvious spinal canal or foraminal stenosis.       L1-L2: No significant disc disease.  No spinal canal stenosis.  No neural foraminal stenosis.       L2-L3: Aforementioned retrolisthesis with minimal circumferential disc bulge and mild bilateral facet arthropathy.  No resultant compressive phenomena.       L3-L4: Aforementioned minimal retrolisthesis with minimal circumferential disc bulge and mild bilateral facet arthropathy results in at most mild left foraminal stenosis.  No spinal canal stenosis.       L4-L5: Aforementioned mild retrolisthesis with mild disc bulge eccentric to the right and mild bilateral facet arthropathy resulting in mild right foraminal stenosis without spinal canal stenosis.       L5-S1: No significant disc disease.  Mild bilateral facet arthropathy. No central canal stenosis.  No neural foraminal stenosis.    Paraspinal muscles & soft tissues: Normal.  Impression    Minimal to mild step wise retrolisthesis L2-L5 with mild disc and facet degeneration of the lumbar spine resulting in no more than mild foraminal stenoses.  Partially imaged focal edema signal along the right L3 transverse process may relate to recent fracture. Correlation is recommended with history and point tenderness.    MRI Cervical Spine  Narrative    HISTORY: Radiculopathy.    TECHNIQUE: Multiplanar, multisequence MR images of the cervical spine were acquired  without  intravenous contrast.    COMPARISON: Cervical spine radiographs 09/07/17.  CT cervical spine 06/13/12    FINDINGS:     Alignment: Normal.    Vertebrae: Normal marrow signal. No fracture.    Discs:  Mild disc space narrowing and desiccation throughout the cervical spine, most pronounced at C5-C6.    Cord: Normal.    Skull base and craniocervical junction: Normal.  Degenerative findings:        C2-C3: Minimal left paracentral disc osteophyte complex and left greater than right facet arthropathy.  No spinal canal stenosis.  No neural foraminal stenosis.       C3-C4: Circumferential disc osteophyte complex with minimal bilateral uncovertebral hypertrophy and mild left greater than right facet arthropathy results in mild right and moderate left foraminal stenosis.  No spinal canal stenosis.        C4-C5: Minimal circumferential disc osteophyte complex and mild bilateral uncovertebral hypertrophy without significant spinal canal or foraminal stenosis.        C5-C6: Circumferential disc osteophyte complex with mild bilateral uncovertebral hypertrophy and minimal left facet arthropathy results in mild left foraminal stenosis.  No spinal canal stenosis.       C6-C7: Mild circumferential disc osteophyte complex.  No spinal canal stenosis.  No neural foraminal stenosis.       C7-T1: No significant disc disease.  No spinal canal stenosis.  No neural foraminal stenosis.        T1-T3: Incompletely imaged.  No obvious disc bulge, spinal canal stenosis, or foraminal stenosis.     Paraspinal muscles & soft tissues: Normal.  Impression    Mild disc/endplate and facet degeneration in the cervical spine, most pronounced at C3-C4 and C5-C6 with up to moderate foraminal stenosis.    Pain Scale: Earl rates pain on a scale of 0-10 to be 10 at worst; 8 currently; 6 at best using VAS.     Pain location: Low back and R hip.    Aggravating factors:   Easing Factors: Hot baths, aleve, flexiril.  Disturbed Sleep:  Yes    Pattern of  "pain questions:  1.  Where is your pain the worst? Low back and R hip.  2.  Is your pain constant or intermittent? Constent  3.  Does bending forward make your typical pain worse? Yes  4.  Since the start of your back pain, has there been a change in your bowel or bladder? Yes, poor control at times.   5.  What can't you do now that you use to be able to do? Everything    Prior Treatment: This program before; and has helped  Prior functional status: Unlimited/manaeable  DME owned/used: none  Occupation:  /artist/teacher   Leisure: Reading, paint, walking, watching movies; I would like to go out and do things.                     Pts goals:  Functional normally; exercise; and decrease pain.     Red Flag Screening:   Cough  Sneeze  Strain: (+)  Bladder/ bowel: (+)  Falls: (+)  Night pain: (+)  Unexplained weight loss: (--)  General health: "Good"    OBJECTIVE     Postural examination/scapula alignment: Rounded shoulder, Head forward and Slouched posture; changes positions frequently when sitting due to pain. R sided hump w/ fwd flexion, appears as scoliosis.   Observation: Tremors, shaking hands,   Skin integrity: Spider bit on the neck. All other normal.   Edema: None  Correction of posture: better with lumbar roll, with slim line roll.     MOVEMENT LOSS    ROM Loss   Flexion moderate loss   Extension major loss   Side bending Right major loss   Side bending Left major loss   Rotation Right moderate loss   Rotation Left moderate loss     Gross Lower Extremity Strength:  4/5  5x STS for functional legs strength: 19 seconds with mod difficulty, but no increased LBP.     GAIT:  Assistive Device used: none  Level of Assistance: independent  Patient displays the following gait deviations:  No significant deviations observed.     Special Tests:   Test Name  Test Result   Prone Instability Test (+)   SI Joint Provocation Test (+) LBP   Straight Leg Raise (+) R   Neural Tension Test (+)R   Crossed Straight Leg Raise " DNA   Walking on toes (--)   Walking on heels  (--)     Positive Av at R (R hip); positive at L (low back center)  Positive Piriformis at R.  Positive SLR at R with shooting pain    NEUROLOGICAL SCREENING     Sensory deficit: Diminished B at L 1/L 2 at hips; all other normal    Reflexes:    Left Right   Patella Tendon 2+ 2+   Achilles Tendon 1+ 1+   Babinski  (--) (--)   Clonus (--) (--)     REPEATED TEST MOVEMENTS:  Repeated Flexion in Standing worse   Repeated Extension in Standing worse   Repeated Flexion in lying better   Repeated Extension in lying  worse       STATIC TESTS   Sitting slouched  no effect   Sitting erect better with roll   Standing slouched no effect   Standing erect  worse   Lying prone in extension  worse   Long sitting   DNA       Baseline Isometric Testing on Med X equipment: Testing administered by PT  Date of testin18  ROM 6-36 deg   Max Peak Torque 45    Min Peak Torque 17    Flex/Ext Ratio 2.64:1   % below normative data 69   Counter weight 54   femur 5   Seat pad 0       CMS Impairment/Limitation/Restriction for FOTO Lumbar Spine Survey    Therapist reviewed FOTO scores for Earl Abdul on 2018.   FOTO documents entered into [employer] - see Media section.    Limitation Score: 69%  Category: Selfcare/Other    Current : CL = least 60% but < 80% impaired, limited or restricted  Goal: CK = at least 40% but < 60% impaired, limited or restricted 55%  Discharge:        CMS Impairment/Limitation/Restriction for FOTO Lumbar Spine Survey  Status Limitation G-Code CMS Severity Modifier  Intake 31% 69% Current Status CL - At least 60 percent but less than 80 percent  Predicted 45% 55% Goal Status+ CK - At least 40 percent but less than 60 percent  D/C Status CL **only report if this is a one time visit    Treatment       PT Evaluation Completed? Yes  Discussed Plan of Care with patient: Yes      Home Exercise Program as follows:   Handouts were given to the patient. Pt demo good  understanding of the education provided. Earl demonstrated good return demonstration of activities.     - Patient received education regarding proper posture and body mechanics.  Patient was given top 10 tips handout which discusses posture seated, standing, lifting correctly, components of exercise, importance of nutrition and hydration, and importance of sleep.  - Tor roll tried, recommended, and purchase information was provided.    - Patient received a handout regarding anticipated muscular soreness following the isometric test and strategies for management were reviewed with patient including stretching, using ice and scheduled rest.       Pt was instructed in and performed the following:   Cardiovascular exercise and therapeutic exercise to improve posture, lumbar/cervical ROM, strength, and muscular endurance as follows:     Earl received therapeutic exercises to develop/improve posture, lumbar/cervical ROM, strength and muscular endurance for 25 minutes including the following exercises:     [facility] 11/14/2018   Visit Number 1   VAS Pain Rating 8   Flexion in Lying 10   Manual Therapy 6   Lumbar Extension Seat Pad 0   Femur Restraint 5   Top Dead Center 24   Counterweight 54   Lumbar Flexion 36   Lumbar Extension 6   Lumbar Peak Torque 45   Min Torque 17   Test Percent Below Normative Data 69   Lumbar Weight 25   Ice - Z Lie (in min.) 10     HEP:  DKTC with ball  LTR with Ball  Walking at least 3x/day for 30 minutes or more as tolerated.     Earl received the following manual therapy techniques: 6 minutes.   STM/MFR with Beck to Lumbosacral region in prone.    Assessment     This is a 58 y.o. female referred to [facility] and presents with a medical diagnosis of Lumbar back pain and demonstrates limitations as described below in the problem list. Patient presents with deficits with Lumbar spine AROM, core/back weakness, and B LE's weakness (MMT 4/5 and 5x STS for functional  leg strength at 19 seconds). Patient displayed balance disturbance with LOB during transfers to treatment table. Patient displayed tremors/shaking whole body at rest and with movements. She was in a lot of pain today 8/10, but reported some relief with manual therapy using Beck. She displayed signs and symptoms of R sided sciatica per testing.  Patient responded well to BETTY and LTR with ball.  Patient displayed significant back extensor weakness with medX test, displaying results at 69% below normative data. Peak torque was assessed at 45 ft-lbs and min torque at 17 ft-lbs. Pt rehab potential is Good given that patient completed this program before.     Pain Pattern: 1 PEN     Patient received education on the [facility] program, purpose of the isometric test, progression of back strengthening as well as wellness approach and systemic strengthening.  Details of the program were discussed.  Reviewed that patient should feel support/pressure from med ex restraints but no pain or discomfort and patient expressed understanding.    Based on the above history and physical examination an active physical therapy program is recommended.  Pt will continue to benefit from skilled outpatient physical therapy to address the deficits listed below in the chart, provide pt/family education and to maximize pt's level of independence in the home and community environment. .     No environmental, cultural, spiritual, developmental or education needs expressed or noted    Medical necessity is demonstrated by the following problem list.    Pt presents with the following impairments:     History  Co-morbidities and personal factors that may impact the plan of care Co-morbidities:   Age and multiple co-morbidities including seizure, RLS,      Personal Factors:   age  lifestyle     low   Examination  Body Structures and Functions, activity limitations and participation restrictions that may impact the plan of care Body Regions:    back  lower extremities  trunk    Body Systems:    ROM  strength  balance  gait  transfers  transitions    Participation Restrictions:   Pain level    Activity limitations:   Learning and applying knowledge  no deficits    General Tasks and Commands  no deficits    Communication  no deficits    Mobility  lifting and carrying objects  walking  driving (bike, car, motorcycle)    Self care  washing oneself (bathing, drying, washing hands)  caring for body parts (brushing teeth, shaving, grooming)  looking after one's health    Domestic Life  shopping  cooking  doing house work (cleaning house, washing dishes, laundry)  assisting others    Interactions/Relationships  no deficits    Life Areas  no deficits    Community and Social Life  recreation and leisure         moderate   Clinical Presentation stable and uncomplicated low   Decision Making/ Complexity Score: low       GOALS: Pt is in agreement with the following goals.    Short term goals:  6 weeks or 10 visits   1.  Pt will demonstrate increased lumbar ROM by at least 3 degrees from the initial ROM value with improvements noted in functional ROM and ability to perform ADLs  2.  Pt will demonstrate increased maximum isometric torque value by 10% when compared to the initial value resulting in improved ability to perform bending, lifting, and carrying activities safely, confidently.    3.  Patient report a reduction in worst pain score by 1-2 points for improved tolerance during work and recreational activities  4.  Pt able to perform HEP correctly with minimal cueing or supervision for therapist      Long term goals: 13 weeks or 20 visits   1. Pt will demonstrate increased lumbar ROM by at least 6 degrees from initial ROM value, resulting in improved ability to perform functional fwd bending while standing and sitting.   2. Pt will demonstrate increased maximum isometric torque value by 20% when compared to the initial value resulting in improved ability to  "perform bending, lifting, and carrying activities safely, confidently.  3. Pt to demonstrate ability to independently control and reduce their pain through posture positioning and mechanical movements throughout a typical day.  4.  Patient will demonstrate improved overall function per FOTO Survey to CK = at least 40% but < 60% impaired, limited or restricted score or less.  5. Patient will improved 5x STS test for functional leg strength to 12 seconds or lower, in order to demonstrate improved legs strength (w/in age norm).    Plan   Outpatient physical therapy 2x week for 13 weeks or 20 visits to include the following:   - Patient education  - Therapeutic exercise  - Manual therapy  - Performance testing   - Neuromuscular Re-education  - Therapeutic activity   - Modalities    Pt may be seen by PTA as part of the rehabilitation team.     Therapist: John Maldonado, PT  11/14/2018    "I certify the need for these services furnished under this plan of treatment and while under my care."    ____________________________________  Physician/Referring Practitioner    _______________  Date of Signature          "

## 2018-11-20 ENCOUNTER — CLINICAL SUPPORT (OUTPATIENT)
Dept: REHABILITATION | Facility: OTHER | Age: 58
End: 2018-11-20
Attending: FAMILY MEDICINE
Payer: COMMERCIAL

## 2018-11-20 DIAGNOSIS — M53.86 DECREASED ROM OF LUMBAR SPINE: ICD-10-CM

## 2018-11-20 DIAGNOSIS — R53.1 WEAKNESS: ICD-10-CM

## 2018-11-20 PROCEDURE — 97110 THERAPEUTIC EXERCISES: CPT

## 2018-11-20 NOTE — PROGRESS NOTES
[facility]      Name: Earl Abdul  Clinic Number: [id number]  Date of Treatment: 2018   Diagnosis: No diagnosis found.  Physician: Izzy Garcia MD    Pain pattern determined: 1 PEN  Plan of care signed: not as of yet   Time in: 830am  Time Out: 930am  Total Treatment time: 60  Precautions: Fibromyalgia, Hx of closed dislocation of ankle; seizure;   Visit #: 2    POC due:19  Reassessment due:18    Subjective   Earl reports she is in severe pain today.  Pt states she has been performing her HEP dilligently  Patient reports their pain to be 8/10 on a 0-10 scale with 0 being no pain and 10 being the worst pain imaginable.    Pain Location: R hip/LB  Occupation:  /artist/teacher   Leisure: Reading, paint, walking, watching movies; I would like to go out and do things.                     Pts goals:  Functional normally; exercise; and decrease pain.      Objective        Baseline Isometric Testing on Med X equipment: Testing administered by PT  Date of testin18  ROM 6-36 deg   Max Peak Torque 45    Min Peak Torque 17    Flex/Ext Ratio 2.64:1   % below normative data 69   Counter weight 54   femur 5   Seat pad 0         CMS Impairment/Limitation/Restriction for FOTO Lumbar Spine Survey     Therapist reviewed FOTO scores for Earl Abdul on 2018.   FOTO documents entered into [employer] - see Media section.     Limitation Score: 69%  Category: Selfcare/Other     Current : CL = least 60% but < 80% impaired, limited or restricted  Goal: CK = at least 40% but < 60% impaired, limited or restricted 55%  Discharge:          CMS Impairment/Limitation/Restriction for FOTO Lumbar Spine Survey  Status Limitation G-Code CMS Severity Modifier  Intake 31% 69% Current Status CL - At least 60 percent but less than 80 percent  Predicted 45% 55% Goal Status+ CK - At least 40 percent but less than 60 percent  D/C Status CL **only report if this is a one time  visit       Treatment    Pt was instructed in and performed the following:     Earl received therapeutic exercises to develop/improved posture, cardiovascular endurance, muscular endurance, lumbar/cervical ROM, strength and muscular endurance for 60 minutes including the following exercises:     [facility] 11/20/2018   Visit Number 2   VAS Pain Rating 8   Treadmill Time (in min.) 10   Flexion in Lying 10   Manual Therapy -   Lumbar Extension Seat Pad -   Femur Restraint -   Top Dead Center -   Counterweight -   Lumbar Flexion -   Lumbar Extension -   Lumbar Peak Torque -   Min Torque -   Test Percent Below Normative Data -   Lumbar Weight 30   Repetitions 16   Rating of Perceived Exertion 3   Ice - Z Lie (in min.) 10       Peripheral muscle strengthening which included 1 set of 15-20 repetitions at a slow, controlled 7 second per rep pace focused on strengthening supporting musculature for improved body mechanics and functional mobility.  Pt and therapist focused on proper form during treatment to ensure optimal strengthening of each targeted muscle group.  Machines were utilized including torso rotation, leg extension, leg curl, chest press, upright row. Tricep extension, bicep curl, leg press, and hip abduction added on third visit.             Home Exercise Program as follows:     DKTC with ball  LTR with Ball    Handouts were given to the patient. Pt demo good understanding of the education provided. Earl demonstrated good return demonstration of activities.     Lumbar roll use compliance: unknown  Additional exercises taught this treatment session: none, reviewed    Assessment     Pt tolerated treatment fair.  Pt arrived with high pain level today and reports she already stretched and iced this AM. Pt reports she has acupuncture weekly and usually it helps with pain level.  Initiated pt at 30ft/lbs with completion of 16 reps.  DIscussed strengthening benefits for pain relief  Patient is making good  progress towards established goals.  Pt will continue to benefit from skilled outpatient physical therapy to address the deficits stated in the impairment chart, provide pt/family education and to maximize pt's level of independence in the home and community environment.       Pt's spiritual, cultural and educational needs considered and pt agreeable to plan of care and goals as stated below:     Medical necessity is demonstrated by the following problem list.    Pt presents with the following impairments:      History  Co-morbidities and personal factors that may impact the plan of care Co-morbidities:   Age and multiple co-morbidities including seizure, RLS,  Liver disease     Personal Factors:   age  lifestyle       low   Examination  Body Structures and Functions, activity limitations and participation restrictions that may impact the plan of care Body Regions:   back  lower extremities  trunk     Body Systems:    ROM  strength  balance  gait  transfers  transitions     Participation Restrictions:   Pain level     Activity limitations:   Learning and applying knowledge  no deficits     General Tasks and Commands  no deficits     Communication  no deficits     Mobility  lifting and carrying objects  walking  driving (bike, car, motorcycle)     Self care  washing oneself (bathing, drying, washing hands)  caring for body parts (brushing teeth, shaving, grooming)  looking after one's health     Domestic Life  shopping  cooking  doing house work (cleaning house, washing dishes, laundry)  assisting others     Interactions/Relationships  no deficits     Life Areas  no deficits     Community and Social Life  recreation and leisure             moderate   Clinical Presentation stable and uncomplicated low   Decision Making/ Complexity Score: low         GOALS: Pt is in agreement with the following goals.     Short term goals:  6 weeks or 10 visits   1.  Pt will demonstrate increased lumbar ROM by at least 3 degrees from  the initial ROM value with improvements noted in functional ROM and ability to perform ADLs  2.  Pt will demonstrate increased maximum isometric torque value by 10% when compared to the initial value resulting in improved ability to perform bending, lifting, and carrying activities safely, confidently.     3.  Patient report a reduction in worst pain score by 1-2 points for improved tolerance during work and recreational activities  4.  Pt able to perform HEP correctly with minimal cueing or supervision for therapist        Long term goals: 13 weeks or 20 visits   1. Pt will demonstrate increased lumbar ROM by at least 6 degrees from initial ROM value, resulting in improved ability to perform functional fwd bending while standing and sitting.   2. Pt will demonstrate increased maximum isometric torque value by 20% when compared to the initial value resulting in improved ability to perform bending, lifting, and carrying activities safely, confidently.  3. Pt to demonstrate ability to independently control and reduce their pain through posture positioning and mechanical movements throughout a typical day.  4.  Patient will demonstrate improved overall function per FOTO Survey to CK = at least 40% but < 60% impaired, limited or restricted score or less.  5. Patient will improved 5x STS test for functional leg strength to 12 seconds or lower, in order to demonstrate improved legs strength (w/in age norm).           Plan   Continue with established Plan of Care towards established PT goals.

## 2018-11-27 ENCOUNTER — CLINICAL SUPPORT (OUTPATIENT)
Dept: REHABILITATION | Facility: OTHER | Age: 58
End: 2018-11-27
Attending: FAMILY MEDICINE
Payer: COMMERCIAL

## 2018-11-27 DIAGNOSIS — M53.86 DECREASED ROM OF LUMBAR SPINE: ICD-10-CM

## 2018-11-27 DIAGNOSIS — R53.1 WEAKNESS: ICD-10-CM

## 2018-11-27 PROCEDURE — 97110 THERAPEUTIC EXERCISES: CPT | Performed by: PHYSICAL THERAPIST

## 2018-11-27 PROCEDURE — 97140 MANUAL THERAPY 1/> REGIONS: CPT | Performed by: PHYSICAL THERAPIST

## 2018-11-27 NOTE — PROGRESS NOTES
[facility]      Name: Earl Abdul  Clinic Number: [id number]  Date of Treatment: 2018   Diagnosis:   Encounter Diagnoses   Name Primary?    Decreased ROM of lumbar spine     Weakness      Physician: Izzy Garcia MD    Pain pattern determined: 1 PEN  Plan of care signed: not as of yet   Time in: 1000am  Time Out: 1100am  Total Treatment time: 60  Precautions: Fibromyalgia, Hx of closed dislocation of ankle; seizure;   Visit #: 3/20    POC due:19  Reassessment due:18    Subjective   Earl reports she is in severe pain today.  Pt states she has been performing her HEP dilligently.  She would like to try FDN today.   Patient reports their pain to be 8/10 on a 0-10 scale with 0 being no pain and 10 being the worst pain imaginable.    Pain Location: R hip/LB  Occupation:  /artist/teacher   Leisure: Reading, paint, walking, watching movies; I would like to go out and do things.                     Pts goals:  Functional normally; exercise; and decrease pain.      Objective        Baseline Isometric Testing on Med X equipment: Testing administered by PT  Date of testin18  ROM 6-36 deg   Max Peak Torque 45    Min Peak Torque 17    Flex/Ext Ratio 2.64:1   % below normative data 69   Counter weight 54   femur 5   Seat pad 0         CMS Impairment/Limitation/Restriction for FOTO Lumbar Spine Survey     Therapist reviewed FOTO scores for Earl Abdul on 2018.   FOTO documents entered into [employer] - see Media section.     Limitation Score: 69%  Category: Selfcare/Other     Current : CL = least 60% but < 80% impaired, limited or restricted  Goal: CK = at least 40% but < 60% impaired, limited or restricted 55%  Discharge:          CMS Impairment/Limitation/Restriction for FOTO Lumbar Spine Survey  Status Limitation G-Code CMS Severity Modifier  Intake 31% 69% Current Status CL - At least 60 percent but less than 80 percent  Predicted 45% 55%  Goal Status+ CK - At least 40 percent but less than 60 percent  D/C Status CL **only report if this is a one time visit       Treatment    Pt was instructed in and performed the following:     Earl received therapeutic exercises to develop/improved posture, cardiovascular endurance, muscular endurance, lumbar/cervical ROM, strength and muscular endurance for 60 minutes including the following exercises:     [facility] 11/27/2018   Visit Number 3   VAS Pain Rating -   Treadmill Time (in min.) 10   Flexion in Lying 10   Lumbar Weight 30   Repetitions 17   Rating of Perceived Exertion 6   Ice - Z Lie (in min.) 10       Peripheral muscle strengthening which included 1 set of 15-20 repetitions at a slow, controlled 7 second per rep pace focused on strengthening supporting musculature for improved body mechanics and functional mobility.  Pt and therapist focused on proper form during treatment to ensure optimal strengthening of each targeted muscle group.  Machines were utilized including torso rotation, leg extension, leg curl, chest press, upright row. Tricep extension, bicep curl, leg press, and hip abduction added on third visit.       Home Exercise Program as follows:     DKTC with ball  LTR with Ball    Handouts were given to the patient. Pt demo good understanding of the education provided. Earl demonstrated good return demonstration of activities.     Lumbar roll use compliance: unknown  Additional exercises taught this treatment session: none, reviewed.  Trial of FDN with Eric, see note below    Assessment     Pt tolerated treatment fair.  Pt arrived with high pain level today and reports she already stretched and iced this AM. Pt reports she has acupuncture weekly and usually it helps with pain level.  Med X at 30ft/lbs with completion of 17 reps. Trial of FDN with Eric today, monitor for any changes.  DIscussed strengthening benefits for pain relief  Patient is making good progress towards  established goals.  Pt will continue to benefit from skilled outpatient physical therapy to address the deficits stated in the impairment chart, provide pt/family education and to maximize pt's level of independence in the home and community environment.       Pt's spiritual, cultural and educational needs considered and pt agreeable to plan of care and goals as stated below:     Medical necessity is demonstrated by the following problem list.    Pt presents with the following impairments:      History  Co-morbidities and personal factors that may impact the plan of care Co-morbidities:   Age and multiple co-morbidities including seizure, RLS,  Liver disease     Personal Factors:   age  lifestyle       low   Examination  Body Structures and Functions, activity limitations and participation restrictions that may impact the plan of care Body Regions:   back  lower extremities  trunk     Body Systems:    ROM  strength  balance  gait  transfers  transitions     Participation Restrictions:   Pain level     Activity limitations:   Learning and applying knowledge  no deficits     General Tasks and Commands  no deficits     Communication  no deficits     Mobility  lifting and carrying objects  walking  driving (bike, car, motorcycle)     Self care  washing oneself (bathing, drying, washing hands)  caring for body parts (brushing teeth, shaving, grooming)  looking after one's health     Domestic Life  shopping  cooking  doing house work (cleaning house, washing dishes, laundry)  assisting others     Interactions/Relationships  no deficits     Life Areas  no deficits     Community and Social Life  recreation and leisure             moderate   Clinical Presentation stable and uncomplicated low   Decision Making/ Complexity Score: low         GOALS: Pt is in agreement with the following goals.     Short term goals:  6 weeks or 10 visits   1.  Pt will demonstrate increased lumbar ROM by at least 3 degrees from the initial ROM  value with improvements noted in functional ROM and ability to perform ADLs  2.  Pt will demonstrate increased maximum isometric torque value by 10% when compared to the initial value resulting in improved ability to perform bending, lifting, and carrying activities safely, confidently.     3.  Patient report a reduction in worst pain score by 1-2 points for improved tolerance during work and recreational activities  4.  Pt able to perform HEP correctly with minimal cueing or supervision for therapist        Long term goals: 13 weeks or 20 visits   1. Pt will demonstrate increased lumbar ROM by at least 6 degrees from initial ROM value, resulting in improved ability to perform functional fwd bending while standing and sitting.   2. Pt will demonstrate increased maximum isometric torque value by 20% when compared to the initial value resulting in improved ability to perform bending, lifting, and carrying activities safely, confidently.  3. Pt to demonstrate ability to independently control and reduce their pain through posture positioning and mechanical movements throughout a typical day.  4.  Patient will demonstrate improved overall function per FOTO Survey to CK = at least 40% but < 60% impaired, limited or restricted score or less.  5. Patient will improved 5x STS test for functional leg strength to 12 seconds or lower, in order to demonstrate improved legs strength (w/in age norm).         Plan   Continue with established Plan of Care towards established PT goals. Monitor trial of FDN for decreased pain.

## 2018-11-27 NOTE — PROGRESS NOTES
PHYSICAL THERAPY FUNCTIONAL DRY NEEDLING TREATMENT NOTE    Date:  11/27/2018      Start Time:  10:12am  Stop Time:  10:22am  Total Billable Time: 10 minutes    Progress/Current Status    Subjective:     Patient ID: Earl Abdul is a 58 y.o. female.  Diagnosis:   1. Decreased ROM of lumbar spine     2. Weakness       Pain: 8 /10  Location:  R side lower back into R hip and down R leg      Objective:     Soft Tissue Palpation Assessment to determine the necessity for Functional Dry Needling. Pt presents with hypertonicity and tenderness to palpation upon R quadratus lumborum and paraspinal musculature.   Patient provided written and verbal consent to FDN.   Earl received the following manual therapy techniques: DN to R quadratus lumborum muscle belly with fanning technique, muscle twitch elicited. Periosteal pecking to Quadratus lumborum insertion upon R iliac crest. DN with winding to R lumbar multifidus approx L2-L5, needles left in 3 minutes.    Assessment:         Patient demonstrated appropriate response to FDN and tolerated treatment well with decrease pain reported post treatment. She exhibited improved soft tissue pliability post treatment with no reported tenderness upon palpation. Pt had an extremely tight QL and lumbar multifidus. Twitch response elicited in QL muscle belly.   Patient educated on benefits and risks of FDN before initial treatment.   Patient then educated on response to FDN.    Plans and Goals:     Monitor response to FDN. Continue with FDN in POC as appropriate      Eric Vicente, PT 11/27/2018

## 2018-11-29 ENCOUNTER — CLINICAL SUPPORT (OUTPATIENT)
Dept: REHABILITATION | Facility: OTHER | Age: 58
End: 2018-11-29
Attending: FAMILY MEDICINE
Payer: COMMERCIAL

## 2018-11-29 DIAGNOSIS — R53.1 WEAKNESS: ICD-10-CM

## 2018-11-29 DIAGNOSIS — M53.86 DECREASED ROM OF LUMBAR SPINE: ICD-10-CM

## 2018-11-29 PROCEDURE — 97110 THERAPEUTIC EXERCISES: CPT | Performed by: PHYSICAL THERAPIST

## 2018-11-29 NOTE — PROGRESS NOTES
[facility]      Name: Earl Abdul  Clinic Number: [id number]  Date of Treatment: 2018   Diagnosis:   Encounter Diagnoses   Name Primary?    Decreased ROM of lumbar spine     Weakness      Physician: Izzy Garcia MD    Pain pattern determined: 1 PEN  Plan of care signed: not as of yet   Time in: 1000am  Time Out: 1100am  Total Treatment time: 60  Precautions: Fibromyalgia, Hx of closed dislocation of ankle; seizure;   Visit #:     POC due:19  Reassessment due:18    Subjective   Earl reports she remains in severe pain, 12/10 earlier today, so she took a pain pill and is now 6/10 pain.   She was a little better after the FDN last visit.  Pt states she has been performing her HEP dilligently.  She would like to continue with FDN today.   Patient reports their pain to be 8/10 on a 0-10 scale with 0 being no pain and 10 being the worst pain imaginable.    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                     Pts goals:  Functional normally; exercise; and decrease pain.      Objective        Baseline Isometric Testing on Med X equipment: Testing administered by PT  Date of testin18  ROM 6-36 deg   Max Peak Torque 45    Min Peak Torque 17    Flex/Ext Ratio 2.64:1   % below normative data 69   Counter weight 54   femur 5   Seat pad 0         CMS Impairment/Limitation/Restriction for FOTO Lumbar Spine Survey     Therapist reviewed FOTO scores for Earl Abdul on 2018.   FOTO documents entered into [employer] - see Media section.     Limitation Score: 69%  Category: Selfcare/Other     Current : CL = least 60% but < 80% impaired, limited or restricted  Goal: CK = at least 40% but < 60% impaired, limited or restricted 55%  Discharge:          CMS Impairment/Limitation/Restriction for FOTO Lumbar Spine Survey  Status Limitation  G-Code CMS Severity Modifier  Intake 31% 69% Current Status CL - At least 60 percent but less than 80 percent  Predicted 45% 55% Goal Status+ CK - At least 40 percent but less than 60 percent  D/C Status CL **only report if this is a one time visit       Treatment    Pt was instructed in and performed the following:     Earl received therapeutic exercises to develop/improved posture, cardiovascular endurance, muscular endurance, lumbar ROM, strength and muscular endurance for 60 minutes including the following exercises:     [facility] 11/29/2018   Visit Number 4   VAS Pain Rating 7   Treadmill Time (in min.) 5   Speed 1.5   Flexion in Lying 10   Lumbar Weight 30   Repetitions 20   Rating of Perceived Exertion 3   Ice - Z Lie (in min.) 10         Peripheral muscle strengthening which included 1 set of 15-20 repetitions at a slow, controlled 7 second per rep pace focused on strengthening supporting musculature for improved body mechanics and functional mobility.  Pt and therapist focused on proper form during treatment to ensure optimal strengthening of each targeted muscle group.  Machines were utilized including torso rotation, leg extension, leg curl, chest press, upright row. Tricep extension, bicep curl, leg press, and hip abduction added on third visit.       Home Exercise Program as follows:     DKTC with ball  LTR with Ball    Handouts were given to the patient. Pt demo good understanding of the education provided. Earl demonstrated good return demonstration of activities.     Lumbar roll use compliance: unknown  Additional exercises taught this treatment session: none, reviewed.  Trial of FDN with Eric, see note below    Assessment     Pt tolerated treatment fair.  Pt arrived with high pain level today and reports she already stretched and iced this AM. Pt reports she has acupuncture weekly and usually it helps with pain level.  Med X at 30ft/lbs with completion of 20 reps. Pt offered FDN with  Estefanía today but pt denied.  DIscussed strengthening benefits for pain relief  Patient is making good progress towards established goals.  Pt will continue to benefit from skilled outpatient physical therapy to address the deficits stated in the impairment chart, provide pt/family education and to maximize pt's level of independence in the home and community environment.       Pt's spiritual, cultural and educational needs considered and pt agreeable to plan of care and goals as stated below:     Medical necessity is demonstrated by the following problem list.    Pt presents with the following impairments:      History  Co-morbidities and personal factors that may impact the plan of care Co-morbidities:   Age and multiple co-morbidities including seizure, RLS,  Liver disease     Personal Factors:   age  lifestyle       low   Examination  Body Structures and Functions, activity limitations and participation restrictions that may impact the plan of care Body Regions:   back  lower extremities  trunk     Body Systems:    ROM  strength  balance  gait  transfers  transitions     Participation Restrictions:   Pain level     Activity limitations:   Learning and applying knowledge  no deficits     General Tasks and Commands  no deficits     Communication  no deficits     Mobility  lifting and carrying objects  walking  driving (bike, car, motorcycle)     Self care  washing oneself (bathing, drying, washing hands)  caring for body parts (brushing teeth, shaving, grooming)  looking after one's health     Domestic Life  shopping  cooking  doing house work (cleaning house, washing dishes, laundry)  assisting others     Interactions/Relationships  no deficits     Life Areas  no deficits     Community and Social Life  recreation and leisure             moderate   Clinical Presentation stable and uncomplicated low   Decision Making/ Complexity Score: low         GOALS: Pt is in agreement with the following goals.     Short term  goals:  6 weeks or 10 visits   1.  Pt will demonstrate increased lumbar ROM by at least 3 degrees from the initial ROM value with improvements noted in functional ROM and ability to perform ADLs  2.  Pt will demonstrate increased maximum isometric torque value by 10% when compared to the initial value resulting in improved ability to perform bending, lifting, and carrying activities safely, confidently.     3.  Patient report a reduction in worst pain score by 1-2 points for improved tolerance during work and recreational activities  4.  Pt able to perform HEP correctly with minimal cueing or supervision for therapist        Long term goals: 13 weeks or 20 visits   1. Pt will demonstrate increased lumbar ROM by at least 6 degrees from initial ROM value, resulting in improved ability to perform functional fwd bending while standing and sitting.   2. Pt will demonstrate increased maximum isometric torque value by 20% when compared to the initial value resulting in improved ability to perform bending, lifting, and carrying activities safely, confidently.  3. Pt to demonstrate ability to independently control and reduce their pain through posture positioning and mechanical movements throughout a typical day.  4.  Patient will demonstrate improved overall function per FOTO Survey to CK = at least 40% but < 60% impaired, limited or restricted score or less.  5. Patient will improved 5x STS test for functional leg strength to 12 seconds or lower, in order to demonstrate improved legs strength (w/in age norm).         Plan   Continue with established Plan of Care towards established PT goals. Monitor for need of FDN for decreased pain.

## 2018-12-03 ENCOUNTER — CLINICAL SUPPORT (OUTPATIENT)
Dept: REHABILITATION | Facility: OTHER | Age: 58
End: 2018-12-03
Attending: FAMILY MEDICINE
Payer: COMMERCIAL

## 2018-12-03 DIAGNOSIS — M53.86 DECREASED ROM OF LUMBAR SPINE: ICD-10-CM

## 2018-12-03 DIAGNOSIS — R53.1 WEAKNESS: ICD-10-CM

## 2018-12-03 PROCEDURE — 97110 THERAPEUTIC EXERCISES: CPT

## 2018-12-03 NOTE — PROGRESS NOTES
[facility]      Name: aErl Abdul  Clinic Number: [id number]  Date of Treatment: 2018   Diagnosis:   Encounter Diagnoses   Name Primary?    Decreased ROM of lumbar spine     Weakness      Physician: Izzy Garcia MD    Pain pattern determined: 1 PEN  Plan of care signed: not as of yet   Time in: 9:00am  Time Out: 10:00am  Total Treatment time: 60  Precautions: Fibromyalgia, Hx of closed dislocation of ankle; seizure;   Visit #:     POC due:19  Reassessment due:18    Face to Face discussion of patient was done between PT and PTA.     Subjective   Earl reports feeling no pain at the present moment.   She was a little better after the FDN, but not available today.  Pt states she has been performing her HEP dilligently.  Patient reports their pain to be 9/10 on a 0-10 scale with 0 being no pain and 10 being the worst pain imaginable.    Pain Location: R hip/LB  Occupation:  /artist/teacher   Leisure: Reading, paint, walking, watching movies; I would like to go out and do things.                     Pts goals:  Functional normally; exercise; and decrease pain.      Objective        Baseline Isometric Testing on Med X equipment: Testing administered by PT  Date of testin18  ROM 6-36 deg   Max Peak Torque 45    Min Peak Torque 17    Flex/Ext Ratio 2.64:1   % below normative data 69   Counter weight 54   femur 5   Seat pad 0         CMS Impairment/Limitation/Restriction for FOTO Lumbar Spine Survey     Therapist reviewed FOTO scores for Earl Abdul on 2018.   FOTO documents entered into [employer] - see Media section.     Limitation Score: 69%  Category: Selfcare/Other     Current : CL = least 60% but < 80% impaired, limited or restricted  Goal: CK = at least 40% but < 60% impaired, limited or restricted 55%  Discharge:          CMS Impairment/Limitation/Restriction for FOTO Lumbar Spine Survey  Status Limitation G-Code CMS Severity  Modifier  Intake 31% 69% Current Status CL - At least 60 percent but less than 80 percent  Predicted 45% 55% Goal Status+ CK - At least 40 percent but less than 60 percent  D/C Status CL **only report if this is a one time visit       Treatment    Pt was instructed in and performed the following:     Earl received therapeutic exercises to develop/improved posture, cardiovascular endurance, muscular endurance, lumbar ROM, strength and muscular endurance for 60 minutes including the following exercises:     [facility] 12/3/2018   Visit Number 5   VAS Pain Rating 0   Treadmill Time (in min.) 10   Speed 1.5   Flexion in Lying 10   Manual Therapy -   Lumbar Extension Seat Pad -   Femur Restraint -   Top Dead Center -   Counterweight -   Lumbar Flexion -   Lumbar Extension -   Lumbar Peak Torque -   Min Torque -   Test Percent Below Normative Data -   Lumbar Weight 35   Repetitions 17   Rating of Perceived Exertion 4   Ice - Z Lie (in min.) 10     LTR 10x  Piriformis   .    Peripheral muscle strengthening which included 1 set of 15-20 repetitions at a slow, controlled 7 second per rep pace focused on strengthening supporting musculature for improved body mechanics and functional mobility.  Pt and therapist focused on proper form during treatment to ensure optimal strengthening of each targeted muscle group.  Machines were utilized including torso rotation, leg extension, leg curl, chest press, upright row. Tricep extension, bicep curl, leg press, and hip abduction added on third visit.       Home Exercise Program as follows:     DKTC with ball  LTR with Ball    Handouts were given to the patient. Pt demo good understanding of the education provided. Earl demonstrated good return demonstration of activities.     Lumbar roll use compliance: unknown  Additional exercises taught this treatment session:   Piriformis   Assessment     Pt tolerated treatment fair. Added piriformis stretch due to R LB pain. She liked.   Med X with at weight increase  17 reps. Pain decreased a little with session.   Patient is making good progress towards established goals.  Pt will continue to benefit from skilled outpatient physical therapy to address the deficits stated in the impairment chart, provide pt/family education and to maximize pt's level of independence in the home and community environment.       Pt's spiritual, cultural and educational needs considered and pt agreeable to plan of care and goals as stated below:     Medical necessity is demonstrated by the following problem list.    Pt presents with the following impairments:      History  Co-morbidities and personal factors that may impact the plan of care Co-morbidities:   Age and multiple co-morbidities including seizure, RLS,  Liver disease     Personal Factors:   age  lifestyle       low   Examination  Body Structures and Functions, activity limitations and participation restrictions that may impact the plan of care Body Regions:   back  lower extremities  trunk     Body Systems:    ROM  strength  balance  gait  transfers  transitions     Participation Restrictions:   Pain level     Activity limitations:   Learning and applying knowledge  no deficits     General Tasks and Commands  no deficits     Communication  no deficits     Mobility  lifting and carrying objects  walking  driving (bike, car, motorcycle)     Self care  washing oneself (bathing, drying, washing hands)  caring for body parts (brushing teeth, shaving, grooming)  looking after one's health     Domestic Life  shopping  cooking  doing house work (cleaning house, washing dishes, laundry)  assisting others     Interactions/Relationships  no deficits     Life Areas  no deficits     Community and Social Life  recreation and leisure             moderate   Clinical Presentation stable and uncomplicated low   Decision Making/ Complexity Score: low         GOALS: Pt is in agreement with the following goals.     Short term  goals:  6 weeks or 10 visits   1.  Pt will demonstrate increased lumbar ROM by at least 3 degrees from the initial ROM value with improvements noted in functional ROM and ability to perform ADLs  2.  Pt will demonstrate increased maximum isometric torque value by 10% when compared to the initial value resulting in improved ability to perform bending, lifting, and carrying activities safely, confidently.     3.  Patient report a reduction in worst pain score by 1-2 points for improved tolerance during work and recreational activities  4.  Pt able to perform HEP correctly with minimal cueing or supervision for therapist        Long term goals: 13 weeks or 20 visits   1. Pt will demonstrate increased lumbar ROM by at least 6 degrees from initial ROM value, resulting in improved ability to perform functional fwd bending while standing and sitting.   2. Pt will demonstrate increased maximum isometric torque value by 20% when compared to the initial value resulting in improved ability to perform bending, lifting, and carrying activities safely, confidently.  3. Pt to demonstrate ability to independently control and reduce their pain through posture positioning and mechanical movements throughout a typical day.  4.  Patient will demonstrate improved overall function per FOTO Survey to CK = at least 40% but < 60% impaired, limited or restricted score or less.  5. Patient will improved 5x STS test for functional leg strength to 12 seconds or lower, in order to demonstrate improved legs strength (w/in age norm).         Plan   Continue with established Plan of Care towards established PT goals. Monitor for need of FDN for decreased pain.

## 2018-12-12 ENCOUNTER — TELEPHONE (OUTPATIENT)
Dept: SLEEP MEDICINE | Facility: CLINIC | Age: 58
End: 2018-12-12

## 2018-12-12 ENCOUNTER — PATIENT MESSAGE (OUTPATIENT)
Dept: SLEEP MEDICINE | Facility: CLINIC | Age: 58
End: 2018-12-12

## 2019-05-13 ENCOUNTER — OFFICE VISIT (OUTPATIENT)
Dept: OTOLARYNGOLOGY | Facility: CLINIC | Age: 59
End: 2019-05-13
Payer: COMMERCIAL

## 2019-05-13 ENCOUNTER — TELEPHONE (OUTPATIENT)
Dept: OTOLARYNGOLOGY | Facility: CLINIC | Age: 59
End: 2019-05-13

## 2019-05-13 ENCOUNTER — CLINICAL SUPPORT (OUTPATIENT)
Dept: AUDIOLOGY | Facility: CLINIC | Age: 59
End: 2019-05-13
Payer: COMMERCIAL

## 2019-05-13 DIAGNOSIS — H91.90 HIGH FREQUENCY HEARING LOSS, UNSPECIFIED LATERALITY: Primary | ICD-10-CM

## 2019-05-13 DIAGNOSIS — H61.21 IMPACTED CERUMEN OF RIGHT EAR: ICD-10-CM

## 2019-05-13 DIAGNOSIS — J01.90 SUBACUTE RHINOSINUSITIS: ICD-10-CM

## 2019-05-13 DIAGNOSIS — H91.13 PRESBYCUSIS OF BOTH EARS: Primary | ICD-10-CM

## 2019-05-13 PROCEDURE — 99999 PR PBB SHADOW E&M-EST. PATIENT-LVL II: CPT | Mod: PBBFAC,,, | Performed by: NURSE PRACTITIONER

## 2019-05-13 PROCEDURE — 92557 COMPREHENSIVE HEARING TEST: CPT | Mod: S$GLB,,, | Performed by: AUDIOLOGIST

## 2019-05-13 PROCEDURE — 69210 REMOVE IMPACTED EAR WAX UNI: CPT | Mod: S$GLB,,, | Performed by: NURSE PRACTITIONER

## 2019-05-13 PROCEDURE — 92557 PR COMPREHENSIVE HEARING TEST: ICD-10-PCS | Mod: S$GLB,,, | Performed by: AUDIOLOGIST

## 2019-05-13 PROCEDURE — 92567 TYMPANOMETRY: CPT | Mod: S$GLB,,, | Performed by: AUDIOLOGIST

## 2019-05-13 PROCEDURE — 99214 PR OFFICE/OUTPT VISIT, EST, LEVL IV, 30-39 MIN: ICD-10-PCS | Mod: 25,S$GLB,, | Performed by: NURSE PRACTITIONER

## 2019-05-13 PROCEDURE — 99214 OFFICE O/P EST MOD 30 MIN: CPT | Mod: 25,S$GLB,, | Performed by: NURSE PRACTITIONER

## 2019-05-13 PROCEDURE — 99999 PR PBB SHADOW E&M-EST. PATIENT-LVL II: ICD-10-PCS | Mod: PBBFAC,,, | Performed by: NURSE PRACTITIONER

## 2019-05-13 PROCEDURE — 92567 PR TYMPA2METRY: ICD-10-PCS | Mod: S$GLB,,, | Performed by: AUDIOLOGIST

## 2019-05-13 PROCEDURE — 69210 EAR CERUMEN REMOVAL: ICD-10-PCS | Mod: S$GLB,,, | Performed by: NURSE PRACTITIONER

## 2019-05-13 RX ORDER — AMOXICILLIN AND CLAVULANATE POTASSIUM 875; 125 MG/1; MG/1
1 TABLET, FILM COATED ORAL 2 TIMES DAILY
Qty: 20 TABLET | Refills: 0 | Status: SHIPPED | OUTPATIENT
Start: 2019-05-13 | End: 2019-05-23

## 2019-05-13 RX ORDER — METHYLPREDNISOLONE 4 MG/1
TABLET ORAL
Qty: 1 PACKAGE | Refills: 0 | Status: SHIPPED | OUTPATIENT
Start: 2019-05-13 | End: 2019-10-09

## 2019-05-13 NOTE — PROGRESS NOTES
"  Subjective:      Earl Abdul is a 58 y.o. female who was self-referred for hearing loss.    Patient complains of nasal congestion for the past 3 weeks. Associated symptoms includes right ear pain, maxillary and frontal sinus pressure, decreased hearing, yellow nasal discharge, post-nasal drip, fever, headache, productive cough, eye itching and dizziness. She denies change in vision, change in speech, hyposmia, halitosis, nausea. She states she had see a physician in [place], [place] 3 weeks ago who had prescribed her Levoquin but she stopped the dose after 6 days due to it making her "shake." There is not a family history of hearing loss at a young age.  There is not a prior history of ear surgery.  There is not a prior history of ear infections .  She denies a history of significant noise exposure.      Past Medical History  She has a past medical history of Anemia, Anemia, Depression, Diverticulitis, Fatty liver, GERD (gastroesophageal reflux disease), Hyperlipidemia, Hypertension, Pancreatitis, Peptic ulcer disease, Polysubstance abuse, Posterior reversible encephalopathy syndrome, Sarcoidosis of lung, Sarcoidosis of lung, Seizures, Suicide attempt, and Suicide ideation.    Past Surgical History  She has a past surgical history that includes Hysterectomy; Appendectomy; mediastenoscopy; Tubal ligation; Tonsillectomy (N/A, 1970); Esophagogastroduodenoscopy (10/7/2016, 11/6/2014); Flexible sigmoidoscopy (11/06/2014); and Colonoscopy (N/A, 7/28/2017).    Family History  Her family history includes Breast cancer in her daughter and maternal aunt; Colon cancer in her maternal uncle; Diabetes in her father and mother; Heart attack in her father; Hypertension in her father and mother.    Social History  She reports that she has been smoking cigarettes.  She has a 30.00 pack-year smoking history. She has never used smokeless tobacco. She reports that she does not drink alcohol or use drugs.    Allergies  She is " allergic to fentanyl; lortab  [hydrocodone-acetaminophen]; and phenothiazines.    Medications  She has a current medication list which includes the following prescription(s): amoxicillin-clavulanate 875-125mg, b.ani/l.aci/l.sal/l.plan/l.barbara, baclofen, buspirone, desvenlafaxine succinate, dexlansoprazole, dicyclomine, fluticasone propionate, gabapentin, ibuprofen, lorazepam, methylprednisolone, ondansetron, pantoprazole, sumatriptan, trazodone, UNABLE TO FIND, and UNABLE TO FIND.    Review of Systems   Constitutional: Positive for fever. Negative for chills and unexpected weight change.   HENT: Positive for congestion, ear pain, hearing loss, postnasal drip, sinus pressure, sinus pain and tinnitus. Negative for ear discharge, facial swelling, sore throat and trouble swallowing.    Eyes: Positive for itching. Negative for pain and visual disturbance.   Respiratory: Positive for cough. Negative for apnea and shortness of breath.    Cardiovascular: Negative for chest pain and palpitations.   Gastrointestinal: Negative for abdominal pain and nausea.   Endocrine: Negative for cold intolerance and heat intolerance.   Musculoskeletal: Negative for joint swelling and neck stiffness.   Skin: Negative for color change and rash.   Neurological: Positive for dizziness and headaches. Negative for facial asymmetry.   Hematological: Negative for adenopathy. Does not bruise/bleed easily.   Psychiatric/Behavioral: Positive for sleep disturbance. Negative for agitation. The patient is not nervous/anxious.           Objective:     There were no vitals taken for this visit.     Constitutional:   She is oriented to person, place, and time. Vital signs are normal. She appears well-developed and well-nourished. She appears alert. Normal speech.      Head:  Normocephalic and atraumatic.     Ears:    Right Ear: No lacerations. No drainage, swelling or tenderness. No foreign bodies. No mastoid tenderness. Tympanic membrane is not injected,  not scarred, not perforated, not erythematous, not retracted and not bulging. Tympanic membrane mobility is normal. No middle ear effusion. No hemotympanum. Decreased hearing is noted.   Left Ear: No lacerations. No drainage, swelling or tenderness. No foreign bodies. No mastoid tenderness. Tympanic membrane is not injected, not scarred, not perforated, not erythematous, not retracted and not bulging. Tympanic membrane mobility is normal.  No middle ear effusion. No hemotympanum. Decreased hearing is noted.     Nose:  Rhinorrhea and septal deviation present. No mucosal edema, nose lacerations, sinus tenderness, nasal septal hematoma or polyps. No epistaxis.  No foreign bodies. Right sinus exhibits maxillary sinus tenderness and frontal sinus tenderness. Left sinus exhibits maxillary sinus tenderness and frontal sinus tenderness.   Left septal deviation    Mouth/Throat  Oropharynx not clear and moist, abnormal uvula midline and lips, teeth, and gums normal. No uvula swelling, oral lesions, trismus, mucous membrane lesions or xerostomia. No oropharyngeal exudate, posterior oropharyngeal edema or posterior oropharyngeal erythema.   Tonsils absent bilaterally      Neck:  Neck normal without thyromegaly masses, asymmetry, normal tracheal structure, crepitus, and tenderness and thyroid normal.        Head (right side): Submental adenopathy present.        Head (left side): Submental adenopathy present.     Pulmonary/Chest:   Effort normal.     Psychiatric:   She has a normal mood and affect. Her speech is normal and behavior is normal.   CN II-XI intact     Neurological:   She is alert and oriented to person, place, and time.        CN II-XI intact     Skin:   No abrasions, lacerations, lesions, or rashes.        CN II-XI intact       Procedure    None      Data Reviewed    WBC (K/uL)   Date Value   07/05/2018 7.98     Platelets (K/uL)   Date Value   07/05/2018 98 (L)      Creatinine (mg/dL)   Date Value   07/05/2018 0.9      TSH (uIU/mL)   Date Value   07/05/2018 1.404     Glucose (mg/dL)   Date Value   07/05/2018 92     Hemoglobin A1C (%)   Date Value   06/22/2017 4.4         I independently reviewed the tracings of the complete audiometric evaluation performed today.  I reviewed the audiogram with the patient as well.  Pertinent findings include binaural sloping HF sensorineural hearing loss with normal tymps.         Assessment:     1. Presbycusis of both ears    2. Subacute rhinosinusitis    3. Impacted cerumen of right ear         Plan:     I had a long discussion with the patient regarding her condition and the further workup and management options.    Earl was seen today for otalgia.    Diagnoses and all orders for this visit:    Presbycusis of both ears  -     Ambulatory consult to Audiology    Subacute rhinosinusitis  -     amoxicillin-clavulanate 875-125mg (AUGMENTIN) 875-125 mg per tablet; Take 1 tablet by mouth 2 (two) times daily. for 10 days  -     methylPREDNISolone (MEDROL DOSEPACK) 4 mg tablet; Use as directed    Impacted cerumen of right ear  -     Ear Cerumen Removal    This patient has SNHL related to age related changes. I reassured her as to the benign nature of today's findings.  -continue with Fluticasone and Clariton daily.  Follow up in 2 weeks if symptoms do not improve. Will consider nasoendoscopy if sinus symptoms do not improve.

## 2019-05-13 NOTE — PROGRESS NOTES
Ms. Abdul was seen today for a hearing evaluation.     Pure tone audiometry revealed a mild high frequency hearing loss, AU  SRT and PTA are in good agreement bilaterally.  Excellent speech discrimination scores bilaterally   Tympanometry revealed Type A (normal middle ear function), bilaterally      Recommendations:  1. Otologic Evaluation  2. Repeat audiogram as needed  3. Hearing Protection

## 2019-05-13 NOTE — TELEPHONE ENCOUNTER
Clinic visit 5/13/19 patient request a return to work slip due to her company request  She can return to work today she does not  Meet the  Requirement of a 3 day excuse that she asked for.

## 2019-05-13 NOTE — PROCEDURES
Ear Cerumen Removal  Date/Time: 5/13/2019 11:46 AM  Performed by: Janel Ocampo NP  Authorized by: Janel Ocampo NP     Location details:  Right ear  Procedure type: curette    Cerumen  Removal Results:  Cerumen completely removed  Patient tolerance:  Patient tolerated the procedure well with no immediate complications     Procedure Note:    Patient was brought to the minor procedure room and using the operating microscope of the right ear canal which was cleaned of ceruminous debris. There was a significant cerumen impaction.  Using a combination of suction, curettes and cup forceps the patient's cerumen impaction was removed. Tympanic membrane intact. Pt tolerated well. There were no complications.

## 2019-09-20 ENCOUNTER — HOSPITAL ENCOUNTER (OUTPATIENT)
Dept: RADIOLOGY | Facility: HOSPITAL | Age: 59
Discharge: HOME | End: 2019-09-20
Attending: PHYSICIAN ASSISTANT
Payer: COMMERCIAL

## 2019-09-20 ENCOUNTER — OFFICE VISIT (OUTPATIENT)
Dept: SPORTS MEDICINE | Facility: CLINIC | Age: 59
End: 2019-09-20
Payer: COMMERCIAL

## 2019-09-20 VITALS
DIASTOLIC BLOOD PRESSURE: 77 MMHG | HEIGHT: 66 IN | BODY MASS INDEX: 27.32 KG/M2 | SYSTOLIC BLOOD PRESSURE: 139 MMHG | WEIGHT: 170 LBS | HEART RATE: 61 BPM

## 2019-09-20 DIAGNOSIS — M25.551 CHRONIC RIGHT HIP PAIN: Primary | ICD-10-CM

## 2019-09-20 DIAGNOSIS — M76.11 PSOAS TENDINITIS, RIGHT HIP: ICD-10-CM

## 2019-09-20 DIAGNOSIS — M25.551 RIGHT HIP PAIN: ICD-10-CM

## 2019-09-20 DIAGNOSIS — M76.01 GLUTEAL TENDINITIS, RIGHT HIP: ICD-10-CM

## 2019-09-20 DIAGNOSIS — G89.29 CHRONIC RIGHT HIP PAIN: Primary | ICD-10-CM

## 2019-09-20 DIAGNOSIS — M16.11 OSTEOARTHRITIS OF RIGHT HIP, UNSPECIFIED OSTEOARTHRITIS TYPE: ICD-10-CM

## 2019-09-20 DIAGNOSIS — M54.5 CHRONIC RIGHT-SIDED LOW BACK PAIN, WITH SCIATICA PRESENCE UNSPECIFIED: ICD-10-CM

## 2019-09-20 DIAGNOSIS — G89.29 CHRONIC RIGHT-SIDED LOW BACK PAIN, WITH SCIATICA PRESENCE UNSPECIFIED: ICD-10-CM

## 2019-09-20 PROCEDURE — 99999 PR PBB SHADOW E&M-EST. PATIENT-LVL V: ICD-10-PCS | Mod: PBBFAC,,, | Performed by: PHYSICIAN ASSISTANT

## 2019-09-20 PROCEDURE — 73502 X-RAY EXAM HIP UNI 2-3 VIEWS: CPT | Mod: TC,FY,PO,RT

## 2019-09-20 PROCEDURE — 3008F PR BODY MASS INDEX (BMI) DOCUMENTED: ICD-10-PCS | Mod: CPTII,S$GLB,, | Performed by: PHYSICIAN ASSISTANT

## 2019-09-20 PROCEDURE — 99214 OFFICE O/P EST MOD 30 MIN: CPT | Mod: S$GLB,,, | Performed by: PHYSICIAN ASSISTANT

## 2019-09-20 PROCEDURE — 3008F BODY MASS INDEX DOCD: CPT | Mod: CPTII,S$GLB,, | Performed by: PHYSICIAN ASSISTANT

## 2019-09-20 PROCEDURE — 3078F PR MOST RECENT DIASTOLIC BLOOD PRESSURE < 80 MM HG: ICD-10-PCS | Mod: CPTII,S$GLB,, | Performed by: PHYSICIAN ASSISTANT

## 2019-09-20 PROCEDURE — 73502 X-RAY EXAM HIP UNI 2-3 VIEWS: CPT | Mod: 26,RT,, | Performed by: RADIOLOGY

## 2019-09-20 PROCEDURE — 3075F PR MOST RECENT SYSTOLIC BLOOD PRESS GE 130-139MM HG: ICD-10-PCS | Mod: CPTII,S$GLB,, | Performed by: PHYSICIAN ASSISTANT

## 2019-09-20 PROCEDURE — 3075F SYST BP GE 130 - 139MM HG: CPT | Mod: CPTII,S$GLB,, | Performed by: PHYSICIAN ASSISTANT

## 2019-09-20 PROCEDURE — 99214 PR OFFICE/OUTPT VISIT, EST, LEVL IV, 30-39 MIN: ICD-10-PCS | Mod: S$GLB,,, | Performed by: PHYSICIAN ASSISTANT

## 2019-09-20 PROCEDURE — 99999 PR PBB SHADOW E&M-EST. PATIENT-LVL V: CPT | Mod: PBBFAC,,, | Performed by: PHYSICIAN ASSISTANT

## 2019-09-20 PROCEDURE — 73502 XR HIP 2 VIEW RIGHT: ICD-10-PCS | Mod: 26,RT,, | Performed by: RADIOLOGY

## 2019-09-20 PROCEDURE — 3078F DIAST BP <80 MM HG: CPT | Mod: CPTII,S$GLB,, | Performed by: PHYSICIAN ASSISTANT

## 2019-09-20 RX ORDER — DICLOFENAC SODIUM 10 MG/G
2 GEL TOPICAL 3 TIMES DAILY
Qty: 1 TUBE | Refills: 0 | Status: SHIPPED | OUTPATIENT
Start: 2019-09-20 | End: 2019-09-20 | Stop reason: CLARIF

## 2019-09-20 RX ORDER — DICLOFENAC SODIUM 10 MG/G
2 GEL TOPICAL 3 TIMES DAILY
Qty: 1 TUBE | Refills: 0 | Status: SHIPPED | OUTPATIENT
Start: 2019-09-20 | End: 2019-10-09

## 2019-09-23 ENCOUNTER — TELEPHONE (OUTPATIENT)
Dept: PAIN MEDICINE | Facility: CLINIC | Age: 59
End: 2019-09-23

## 2019-09-23 ENCOUNTER — TELEPHONE (OUTPATIENT)
Dept: SPINE | Facility: CLINIC | Age: 59
End: 2019-09-23

## 2019-09-23 NOTE — PROGRESS NOTES
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 No trauma, no mech sxs/instabilty.    She reports 6 weeks of sharp mostly lateral hip pain that intermittently radiates around to her groin. Her pain is constant and has been worse over the last 1-2 weeks. She intermittently has tingling sensation radiating from her lateral hip down her anterior leg to her foot. Pain is constant with sleeping. She also reports chronic right low back pain also.     Today the patient rates pain at a 10/10 on visual analog scale.      Affecting ADLs and exercising    Most activity makes pain worse    Review of Systems   Constitution: Negative. Negative for chills, fever and night sweats.   HENT: Negative for congestion and headaches.    Eyes: Negative for blurred vision, left vision loss and right vision loss.   Cardiovascular: Negative for chest pain and syncope.   Respiratory: Negative for cough and shortness of breath.    Endocrine: Negative for polydipsia, polyphagia and polyuria.   Hematologic/Lymphatic: Negative for bleeding problem. Does not bruise/bleed easily.   Skin: Negative for dry skin, itching and rash.   Musculoskeletal: Negative for falls and muscle weakness.   Gastrointestinal: Negative for abdominal pain and bowel incontinence.   Genitourinary: Negative for bladder incontinence and nocturia.   Neurological: Negative for disturbances in coordination, loss of balance and seizures.   Psychiatric/Behavioral: Negative for depression. The patient does not have insomnia.    Allergic/Immunologic: Negative for hives and persistent infections.   All other systems negative.    PAST MEDICAL HISTORY:   Past Medical History:   Diagnosis Date    Anemia     Anemia     Depression     Diverticulitis     Fatty liver     GERD (gastroesophageal reflux disease)      Hyperlipidemia     Hypertension     Pancreatitis     Peptic ulcer disease     Polysubstance abuse     Posterior reversible encephalopathy syndrome     Sarcoidosis of lung     Sarcoidosis of lung     over 30 yrs ago    Seizures     7/2017    Suicide attempt     Suicide ideation      PAST SURGICAL HISTORY:   Past Surgical History:   Procedure Laterality Date    APPENDECTOMY      BIOPSY N/A 12/28/2015    Per[facility] by [facility] CATH LAB    COLONOSCOPY N/A 7/28/2017    Performed by Aaron [facility]ado [facility] at [facility] ENDO    ESOPHAGOGASTRODUODENOSCOPY  10/7/2016, 11/6/2014    2016 - gastritis, duodenitis, 2014 erosive gastritis    ESOPHAGOGASTRODUODENOSCOPY (EGD) N/A 7/28/2017    [facility] by [facility] ENDO    FLEXIBLE SIGMOIDOSCOPY  11/06/2014    colitis    HYSTERECTOMY      INJECTION-STEROID-EPIDURAL-LUMBAR N/A 11/20/2017    Performed by Guillaume Rico MD at [facility] PAIN MGT    mediastenoscopy      TONSILLECTOMY N/A 1970    TUBAL LIGATION       FAMILY HISTORY:   Family History   Problem Relation Age of Onset    Heart attack Father     Diabetes Father     Hypertension Father     Diabetes Mother     Hypertension Mother     Breast cancer Maternal Aunt     Colon cancer Maternal Uncle     Breast cancer Daughter     Ovarian cancer Neg Hx      SOCIAL HISTORY:   Social History     Socioeconomic History    Marital status:      Spouse name: Not on file    Number of children: Not on file    Years of education: Not on file    Highest education level: Not on file   Occupational History    Not on file   Social Needs    Financial resource strain: Not on file    Food insecurity:     Worry: Not on file     Inability: Not on file    Transportation needs:     Medical: Not on file     Non-medical: Not on file   Tobacco Use    Smoking status: Current Every Day Smoker     Packs/day: 1.00     Years: 30.00     Pack years: 30.00     Types: Cigarettes    Smokeless tobacco:  Never Used   Substance and Sexual Activity    Alcohol use: No    Drug use: No    Sexual activity: Yes     Birth control/protection: Surgical   Lifestyle    Physical activity:     Days per week: Not on file     Minutes per session: Not on file    Stress: Not on file   Relationships    Social connections:     Talks on phone: Not on file     Gets together: Not on file     Attends Orthodox service: Not on file     Active member of club or organization: Not on file     Attends meetings of clubs or organizations: Not on file     Relationship status: Not on file   Other Topics Concern    Not on file   Social History Narrative    Not on file       MEDICATIONS:   Current Outpatient Medications:     B.ANI/L.ACI/L.SAL/L.PLAN/L.GENO (PROBIOTIC FORMULA ORAL), Take 1 tablet by mouth once daily. , Disp: , Rfl:     baclofen (LIORESAL) 10 MG tablet, Take 10 mg by mouth 3 (three) times daily as needed (for spasms). , Disp: , Rfl: 0    busPIRone (BUSPAR) 15 MG tablet, Take 1 tablet by mouth 3 (three) times daily. , Disp: , Rfl:     desvenlafaxine (PRISTIQ) 100 MG 24 hr tablet, Take 1 tablet by mouth once daily. , Disp: , Rfl:     dexlansoprazole (DEXILANT) 60 mg capsule, Take 60 mg by mouth once daily., Disp: , Rfl:     dicyclomine (BENTYL) 10 MG capsule, Take 10 mg by mouth 4 (four) times daily., Disp: , Rfl:     fluticasone (FLONASE) 50 mcg/actuation nasal spray, 1 spray (50 mcg total) by Each Nare route 2 (two) times daily as needed for Rhinitis., Disp: 15 g, Rfl: 0    gabapentin (NEURONTIN) 300 MG capsule, take 1 capsule by mouth four times a day, Disp: , Rfl:     ibuprofen (ADVIL,MOTRIN) 600 MG tablet, Take 1 tablet (600 mg total) by mouth every 6 (six) hours as needed for Pain (or fever)., Disp: 20 tablet, Rfl: 0    lorazepam (ATIVAN) 1 MG tablet, Take 1 mg by mouth 2 (two) times daily as needed for Anxiety. , Disp: , Rfl: 0    methylPREDNISolone (MEDROL DOSEPACK) 4 mg tablet, Use as directed, Disp: 1 Package,  "Rfl: 0    ondansetron (ZOFRAN ODT) 8 MG TbDL, Take 8 mg by mouth 3 (three) times daily as needed (for nausea and vomiting)., Disp: , Rfl:     pantoprazole (PROTONIX) 40 MG tablet, Take 40 mg by mouth once daily., Disp: , Rfl:     trazodone (DESYREL) 100 MG tablet, Take 300 mg by mouth every evening. , Disp: , Rfl:     UNABLE TO FIND, medication name: NEURO-MAG Take 3 capsules by mouth once daily, Disp: , Rfl:     UNABLE TO FIND, medication name: Sleep Reset - Take 1 packet by mouth once daily, Disp: , Rfl:     diclofenac sodium (VOLTAREN) 1 % Gel, Apply 2 g topically 3 (three) times daily. Apply to right hip and back area., Disp: 1 Tube, Rfl: 0    sumatriptan (IMITREX) 50 MG tablet, Take 1 tablet (50 mg total) by mouth every 8 (eight) hours as needed for Migraine., Disp: 12 tablet, Rfl: 0  ALLERGIES:   Review of patient's allergies indicates:   Allergen Reactions    Fentanyl Other (See Comments)     Other reaction(s): Itching    Lortab  [hydrocodone-acetaminophen] Other (See Comments)    Phenothiazines        VITAL SIGNS: /77   Pulse 61   Ht 5' 6" (1.676 m)   Wt 77.1 kg (170 lb)   BMI 27.44 kg/m²        PHYSICAL EXAM /  HIP  PHYSICAL EXAMINATION  General:  The patient is alert and oriented x 3.  Mood is pleasant.  Observation of ears, eyes and nose reveal no gross abnormalities.  HEENT: NCAT, sclera nonicteric  Lungs: Respirations are equal and unlabored..    right HIP EXAMINATION     OBSERVATION / INSPECTION  Gait:   Nonantalgic   Alignment:  Neutral   Scars:   None   Muscle atrophy: None   Effusion:  None   Warmth:  None   Discoloration:   None   Leg lengths:   Equal   Pelvis:   Level     TENDERNESS / CREPITUS (T/C):      T / C  Trochanteric bursa   + / -  Piriformis    - / -  SI joint    +/ -  Psoas tendon   + / -  Rectus insertion  - / -  Adductor origin  + / -  Pubic symphysis  - / -  IT band                                   + / -  Gluteus tendons                     + / -    TTP adjacent " to L5 vertebra on right     ROM: (* = pain)    Flexion:    120 degrees*  External rotation: 40 degrees*  Internal rotation with axial load: 25 degrees*  Internal rotation without axial load: 35 degrees*  Abduction:  40 degrees  Adduction:   20 degrees    SPECIAL TESTS:  Pain w/ forced internal rotation (FADIR): -   Pain w/ forced external rotation (DIMAS): Absent   Circumduction test:    -  Stinchfield test:    Negative   Log roll:      Negative   Snapping hip (internal):   Negative   Sit-up pain:     NT  Resisted sit-up pain:    NT  Resisted sit-up with adductor contraction pain:  NT  Step-down test:    +  Trendelenburg test:    Negative  Bridge test     +     EXTREMITY NEURO-VASCULAR EXAMINATION:   Sensation:  Grossly intact to light touch all dermatomal regions.   Motor Function:  Fully intact motor function at hip, knee, foot and ankle    DTRs;  quadriceps and  achilles 2+.  No clonus and downgoing Babinski.    Vascular status:  DP and PT pulses 2+, brisk capillary refill, symmetric.    Skin:  intact, compartments soft.    OTHER FINDINGS:      XRAYS:   2 hip views were independently reviewed and interpreted by myself.  No fracture, subluxation, or other joint abnormality is identified.     MRI of right hip from [facility] external on 9/16/19:  1. Right hip full-thickness tear of the anterior acetabular labrum likely sub-acute to chronic full thickness tear of the posterior labrum with minor osteoarthritis with moderate femoroacetabular chondromalacia.   2. Minor to moderate iliopsoas bursitis on the right.  3. Minor osteitis pubis (left greater than right) with partial fraying of the rectus abdominous adductor aponeurosis with grade 1 adductor strain on the right.     ASSESSMENT:    1. right hip pain, chronic  2. Lumbar back pain  3. Right hip osteoarthritis   hip abd/core weakness    Multiple etiologies and origins of pain today on exam. Unsure of exact pain source today.     PLAN:  1. PT for right hip and back  pain likely from core, hip, and glute weakness. Eval and treat with HEP and dry needling if needed. Pain from lumbar back, glute tendinitis, iliopsoas tendinitis, adductor tendinitis.  2. Diclofenac gel TID to hip  3. Ice or heat compresses  4. Referral to Dr. Rico for right iliopsoas bursa/tendon and glute tendon steroid and lidocaine injection. Keep pain log after injections.  5. RTC in 8-10 weeks    All questions were answered, pt will contact us for questions or concerns in the interim.

## 2019-09-23 NOTE — TELEPHONE ENCOUNTER
Spoke with Tiffanie Henrique, I notified him that Dr. Metcalf is on a leave of absence and is referring his patients to Dr. Smith for further treatment. I also notified Mr. Duenas that Dr. Smith's nurse will contact him to schedule hsi wife for an appt, Henrique stated ok thank you for calling back.

## 2019-09-23 NOTE — TELEPHONE ENCOUNTER
----- Message from Annemarie Calabrese RN sent at 9/23/2019 10:19 AM CDT -----  Contact: Henrique (  )   Cris, please call patient and advised Dr. Yousif is no longer seeing patients and we will be referring her to Dr. Elvi Smith. Brenda is copied. She works with Dr. Smith.     ----- Message -----  From: Ivanna Thorpe  Sent: 9/23/2019   9:06 AM CDT  To: [facility] Cantu Staff     Name of Who is Calling : Henrique (  )     What is the request in detail :  Henrique (  )  is requesting a call from staff in regards to scheduling an appointment for his wife for sciatic nerve and lower back pain he states he would like for her to be seen for the next available    ..... Please contact to further discuss and advise.    Can the clinic reply by [facility] :  Phone call only    What Number to Call Back : [phone]

## 2019-09-24 ENCOUNTER — CLINICAL SUPPORT (OUTPATIENT)
Dept: REHABILITATION | Facility: HOSPITAL | Age: 59
End: 2019-09-24
Payer: COMMERCIAL

## 2019-09-24 ENCOUNTER — TELEPHONE (OUTPATIENT)
Dept: PAIN MEDICINE | Facility: CLINIC | Age: 59
End: 2019-09-24

## 2019-09-24 DIAGNOSIS — R26.2 DIFFICULTY WALKING: ICD-10-CM

## 2019-09-24 DIAGNOSIS — M76.11 PSOAS TENDINITIS, RIGHT HIP: ICD-10-CM

## 2019-09-24 DIAGNOSIS — M76.01 GLUTEAL TENDINITIS, RIGHT HIP: Primary | ICD-10-CM

## 2019-09-24 DIAGNOSIS — M62.81 MUSCLE WEAKNESS OF LOWER EXTREMITY: ICD-10-CM

## 2019-09-24 PROCEDURE — 97162 PT EVAL MOD COMPLEX 30 MIN: CPT

## 2019-09-24 PROCEDURE — 97140 MANUAL THERAPY 1/> REGIONS: CPT

## 2019-09-24 NOTE — TELEPHONE ENCOUNTER
Contacted patient to schedule procedure. Date, time, and instructions given and mailed. Patient verbalized understanding.

## 2019-09-24 NOTE — PLAN OF CARE
[facility]  Physical Therapy Initial Evaluation    Name: Earl Abdul  Clinic Number: [id number]    Therapy Diagnosis:   Encounter Diagnoses   Name Primary?    Muscle weakness of lower extremity     Difficulty walking      Physician: Salvador Casas III, *    Physician Orders: PT Eval and Treat   Medical Diagnosis from Referral:   M25.551,G89.29 (ICD-10-CM) - Chronic right hip pain   M54.5,G89.29 (ICD-10-CM) - Chronic right-sided low back pain, with sciatica presence unspecified   Evaluation Date: 9/24/2019  Authorization Period Expiration: 12/31/19  Plan of Care Expiration: 11/19/19  Visit # / Visits authorized: 1/ 20  FOTO: 1/10    Time In: 3:05  Time Out: 3:40  Total Billable Time: 35 minutes     Precautions: Standard, seizure    Subjective   Date of onset: 1 month ago  History of current condition - Earl reports: sudden onset of R sided low back pain and R hip pain one month ago. Pt reports pain runs from right side of low back into right hip and anterior thigh past the knee and down the side of the lower leg. She reports it has gotten a lot worse in the last month. Pt is unable to find a comfortable position but reports standing is the worst. Pt cannot stand or walk > 5 mins currently. Pt also reports tingling on the top of R foot. Pt is currently unable to stand to cook and do housework.      Medical History:   Past Medical History:   Diagnosis Date    Anemia     Anemia     Depression     Diverticulitis     Fatty liver     GERD (gastroesophageal reflux disease)     Hyperlipidemia     Hypertension     Pancreatitis     Peptic ulcer disease     Polysubstance abuse     Posterior reversible encephalopathy syndrome     Sarcoidosis of lung     Sarcoidosis of lung     over 30 yrs ago    Seizures     7/2017    Suicide attempt     Suicide ideation        Surgical History:   Earl Abdul  has a past surgical history that includes Hysterectomy; Appendectomy;  mediastenoscopy; Tubal ligation; Tonsillectomy (N/A, 1970); Esophagogastroduodenoscopy (10/7/2016, 11/6/2014); Flexible sigmoidoscopy (11/06/2014); and Colonoscopy (N/A, 7/28/2017).    Medications:   Earl has a current medication list which includes the following prescription(s): b.ani/l.aci/l.sal/l.plan/l.barbara, baclofen, buspirone, desvenlafaxine succinate, dexlansoprazole, diclofenac sodium, dicyclomine, fluticasone propionate, gabapentin, ibuprofen, lorazepam, methylprednisolone, ondansetron, pantoprazole, sumatriptan, trazodone, UNABLE TO FIND, and UNABLE TO FIND.    Allergies:   Review of patient's allergies indicates:   Allergen Reactions    Fentanyl Other (See Comments)     Other reaction(s): Itching    Lortab  [hydrocodone-acetaminophen] Other (See Comments)    Phenothiazines         Imaging, x-rays of hip negative    Prior Therapy: yes  Social History: Pt lives with their spouse  Occupation: not currently working  Prior Level of Function: Ind  Current Level of Function: not currently driving     Pain:  Current 10/10, worst 10/10, best 8/10   Location: right back  and hip/thigh   Description: Throbbing  Aggravating Factors: Standing, Coughing/Sneezing, Night Time and Getting out of bed/chair  Easing Factors: lying down    Pts goals: to be able to stand and walk without pain     Objective     Palpation: Right lumbar ES, Glute med, piriformis TTP  Sensation: tingling top of R foot  DTRs: L patellar tendon normal, R patellar tendon diminished   Range of Motion/Strength:     Thoracolumbar AROM Pain/Dysfunction with Movement   Flexion NT NT due to severe pain   Extension 0 deg    Right side bending NT    Left side bending NT          Hip Right Left Pain/Dysfunction with Movement   AROM      flexion  80 deg NT Pain R   Internal rotation  15 deg NT    External rotation 45 deg NT        L/E MMT Right Left Pain/Dysfunction with Movement   Hip Flexion 3+/5 5/5    Hip Abduction 3-/5 4+/5    Knee Flexion 3+/5 4+/5     Knee Extension 3+/5 4+/5    Ankle DF 4/5 5/5    Ankle PF 5/5 5/5          Joint Mobility:     - Lumbar: hypomobile, painful L1-5    Flexibility: HS tightness RLE    Special Tests: SLR test +      CMS Impairment/Limitation/Restriction for FOTO Lumbar Survey    Therapist reviewed FOTO scores for Earl Abdul on 9/24/2019.   FOTO documents entered into [employer] - see Media section.    Limitation Score: 73%  Category: Mobility    Current : CL = least 60% but < 80% impaired, limited or restricted  Goal: CK = at least 40% but < 60% impaired, limited or restricted         TREATMENT   Treatment Time In: 3:30  Treatment Time Out: 3:40  Total Treatment time separate from Evaluation: 10 minutes    Earl received therapeutic exercises to develop strength, endurance, posture and core stabilization for 3 minutes including:  Ab brace- attempted but painful    Earl received the following manual therapy techniques: Joint mobilizations were applied to the: Lumbar spine for 8 minutes, including:    R sided UPA's grade 1      Home Exercises Provided and Patient Education Provided     Education provided:   - role of PT        Assessment   Earl is a 59 y.o. female referred to outpatient Physical Therapy with a medical diagnosis of chronic right hip pain and chronic right sided low back pain. Pt presents with severe low back pain on R side with radiating pain into R hip and thigh, muscle weakness and diminished DTRs of RLE, decreased lumbar and hip ROM, and difficulty walking and standing for any length of time.     Pt prognosis is Fair.   Pt will benefit from skilled outpatient Physical Therapy to address the deficits stated above and in the chart below, provide pt/family education, and to maximize pt's level of independence.     Plan of care discussed with patient: Yes  Pt's spiritual, cultural and educational needs considered and patient is agreeable to the plan of care and goals as stated below:     Anticipated Barriers for  therapy: multiple co-morbidities    Medical Necessity is demonstrated by the following  History  Co-morbidities and personal factors that may impact the plan of care Co-morbidities:   depression and HTN    Personal Factors:   lifestyle     moderate   Examination  Body Structures and Functions, activity limitations and participation restrictions that may impact the plan of care Body Regions:   back  lower extremities    Body Systems:    ROM  strength  gross coordinated movement  balance  gait  transfers  transitions    Participation Restrictions:   none    Activity limitations:     Mobility  walking    Self care  washing oneself (bathing, drying, washing hands)  dressing  looking after one's health    Domestic Life  cooking  doing house work (cleaning house, washing dishes, laundry)    Interactions/Relationships  no deficits    Life Areas  basic economic transactions    Community and Social Life  recreation and leisure         high   Clinical Presentation evolving clinical presentation with changing clinical characteristics low   Decision Making/ Complexity Score: moderate       Goals:  Short Term Goals: 4 weeks   1. Patient will be independent with HEP.  2. Patient will improve hip flexion AROM to 100 degrees for functional mobility.    Long Term Goals: 8 weeks   1. Patient will improve RLE MMT by one full grade to perform functional tasks.  2. Patient will improve lumbar flexion ROM to 60 degrees without pain.  3. Patient will improve lumbar extension ROM to 10 degrees without pain.  5. Patient will improve FOTO score to 50% limitation to show functional progress.    Plan   Plan of care Certification: 9/24/2019 to 11/19/19.    Outpatient Physical Therapy 2 times weekly for 8 weeks to include the following interventions: Electrical Stimulation unattended, Gait Training, Manual Therapy, Moist Heat/ Ice, Neuromuscular Re-ed, Patient Education, Therapeutic Activites and Therapeutic Exercise, ASTYM, Kinesiotaping PRN,  Functional Dry Needling    Nadia Yin, PT, DPT

## 2019-10-01 ENCOUNTER — TELEPHONE (OUTPATIENT)
Dept: PAIN MEDICINE | Facility: CLINIC | Age: 59
End: 2019-10-01

## 2019-10-01 ENCOUNTER — TELEPHONE (OUTPATIENT)
Dept: OBSTETRICS AND GYNECOLOGY | Facility: CLINIC | Age: 59
End: 2019-10-01

## 2019-10-01 NOTE — TELEPHONE ENCOUNTER
----- Message from Sparkle Phillips, Patient Care Assistant sent at 10/1/2019  4:26 PM CDT -----  Contact: MARTY SALDAÑA [[id number]]  Name of Who is Calling: MARTY SALDAÑA [[id number]]    What is the request in detail:Requesting a call back in regards to staff receiving [facility] .  Please contact to further discuss and advise      Can the clinic reply by [facility]: No    [facility]:   [phone]

## 2019-10-01 NOTE — TELEPHONE ENCOUNTER
Spoke to  Chantell, informed him have not received referral as of yet, will contact Tiffanie Chantell for an appointment once received.     He verbalized understanding.

## 2019-10-01 NOTE — TELEPHONE ENCOUNTER
----- Message from Kalee Walker sent at 10/1/2019  9:01 AM CDT -----  Contact: Self   Name of Who is Calling: MARTY SALDAÑA [[id number]]      What is the request in detail: pt would like to schedule an appointment states she need her hormones checked .Pt was referred by Dr. Freeman. Please contact to further discuss and advise.      Can the clinic reply by [facility]:N       What Number to Call Back if not in [facility]: [phone]

## 2019-10-03 ENCOUNTER — TELEPHONE (OUTPATIENT)
Dept: PAIN MEDICINE | Facility: CLINIC | Age: 59
End: 2019-10-03

## 2019-10-03 NOTE — TELEPHONE ENCOUNTER
----- Message from Tony Brown sent at 10/3/2019  8:41 AM CDT -----  Contact: MARTY SALDAÑA [[id number]]  Name of Who is Calling: MARTY SALDAÑA [[id number]]     What is the request in detail:Requesting a call back in regards to staff [facility] for [facility] .  Please contact to further discuss and advise       Can the clinic reply by [facility]: No     [facility]   [phone]

## 2019-10-03 NOTE — TELEPHONE ENCOUNTER
Staff contacted and spoke with patient spoke in regards to his message about a referral being placed from Dr. Gonzalez's office.    Staff informed patient that they have checked pt chart and see that a referral has not yet come through, but will keep patient updated when it does comes to get patient schedule.    Pt spouse verbalized understanding and thank the staff.

## 2019-10-09 ENCOUNTER — OFFICE VISIT (OUTPATIENT)
Dept: OBSTETRICS AND GYNECOLOGY | Facility: CLINIC | Age: 59
End: 2019-10-09
Attending: OBSTETRICS & GYNECOLOGY
Payer: COMMERCIAL

## 2019-10-09 ENCOUNTER — TELEPHONE (OUTPATIENT)
Dept: PAIN MEDICINE | Facility: CLINIC | Age: 59
End: 2019-10-09

## 2019-10-09 VITALS
BODY MASS INDEX: 27.56 KG/M2 | DIASTOLIC BLOOD PRESSURE: 66 MMHG | SYSTOLIC BLOOD PRESSURE: 108 MMHG | WEIGHT: 171.5 LBS | HEIGHT: 66 IN

## 2019-10-09 DIAGNOSIS — Z12.39 SCREENING FOR BREAST CANCER: Primary | ICD-10-CM

## 2019-10-09 DIAGNOSIS — Z01.419 ENCOUNTER FOR GYNECOLOGICAL EXAMINATION WITHOUT ABNORMAL FINDING: ICD-10-CM

## 2019-10-09 PROCEDURE — 3078F DIAST BP <80 MM HG: CPT | Mod: CPTII,S$GLB,, | Performed by: OBSTETRICS & GYNECOLOGY

## 2019-10-09 PROCEDURE — 99386 PREV VISIT NEW AGE 40-64: CPT | Mod: S$GLB,,, | Performed by: OBSTETRICS & GYNECOLOGY

## 2019-10-09 PROCEDURE — 3074F SYST BP LT 130 MM HG: CPT | Mod: CPTII,S$GLB,, | Performed by: OBSTETRICS & GYNECOLOGY

## 2019-10-09 PROCEDURE — 99386 PR PREVENTIVE VISIT,NEW,40-64: ICD-10-PCS | Mod: S$GLB,,, | Performed by: OBSTETRICS & GYNECOLOGY

## 2019-10-09 PROCEDURE — 3074F PR MOST RECENT SYSTOLIC BLOOD PRESSURE < 130 MM HG: ICD-10-PCS | Mod: CPTII,S$GLB,, | Performed by: OBSTETRICS & GYNECOLOGY

## 2019-10-09 PROCEDURE — 3078F PR MOST RECENT DIASTOLIC BLOOD PRESSURE < 80 MM HG: ICD-10-PCS | Mod: CPTII,S$GLB,, | Performed by: OBSTETRICS & GYNECOLOGY

## 2019-10-09 RX ORDER — TRAMADOL HYDROCHLORIDE 50 MG/1
TABLET ORAL
Refills: 0 | Status: ON HOLD | COMMUNITY
Start: 2019-09-30 | End: 2019-10-28 | Stop reason: HOSPADM

## 2019-10-09 NOTE — TELEPHONE ENCOUNTER
----- Message from Amy Mckinney sent at 10/9/2019 11:19 AM CDT -----  Contact: Henrique ()  Name of Who is Calling: Henrique ()    What is the request in detail: Would like to speak with provider or staff in regards to tomorrow's procedure. Please contact to further discuss and advise      Can the clinic reply by [facility]: no    What Number to Call Back if not in [facility]: [phone]

## 2019-10-09 NOTE — PROGRESS NOTES
"SUBJECTIVE:   59 y.o. female   for annual routine  Checkup and to discuss hormones. . No LMP recorded. Patient has had a hysterectomy..  She wants her hormones checked ."  She presents with her sister who reports that the patient has a long history of depression.  She is seeing a psychiatrist.  She also has a history of chemical and alcohol dependence.  She has seen Dr. Mathews for pain management    She thinks her hormones could be related to her depression.  She has no night sweats no hot flashes.  She does not have vaginal dryness and has no issues with libido.  She is not sexually active with her  because he is unable to have an erection.  He is seen at therapist in the past  She has not had a mammogram since     Past Medical History:   Diagnosis Date    Anemia     Anemia     Depression     Diverticulitis     Fatty liver     GERD (gastroesophageal reflux disease)     Hyperlipidemia     Hypertension     Pancreatitis     Peptic ulcer disease     Polysubstance abuse     Posterior reversible encephalopathy syndrome     Sarcoidosis of lung     Sarcoidosis of lung     over 30 yrs ago    Seizures     2017    Suicide attempt     Suicide ideation      Past Surgical History:   Procedure Laterality Date    APPENDECTOMY      COLONOSCOPY N/A 2017    Procedure: COLONOSCOPY;  Surgeon: Aaron Alvarado MD;  Location: [facility];  Service: Endoscopy;  Laterality: N/A;    ESOPHAGOGASTRODUODENOSCOPY  10/7/2016, 2014 - gastritis, duodenitis,  erosive gastritis    FLEXIBLE SIGMOIDOSCOPY  2014    colitis    HYSTERECTOMY      mediastenoscopy      TONSILLECTOMY N/A 1970    TUBAL LIGATION       Social History     Socioeconomic History    Marital status:      Spouse name: Not on file    Number of children: Not on file    Years of education: Not on file    Highest education level: Not on file   Occupational History    Not on file   Social Needs    Financial " resource strain: Not on file    Food insecurity:     Worry: Not on file     Inability: Not on file    Transportation needs:     Medical: Not on file     Non-medical: Not on file   Tobacco Use    Smoking status: Current Every Day Smoker     Packs/day: 1.00     Years: 30.00     Pack years: 30.00     Types: Cigarettes    Smokeless tobacco: Never Used   Substance and Sexual Activity    Alcohol use: No    Drug use: No    Sexual activity: Yes     Birth control/protection: Surgical   Lifestyle    Physical activity:     Days per week: Not on file     Minutes per session: Not on file    Stress: Not on file   Relationships    Social connections:     Talks on phone: Not on file     Gets together: Not on file     Attends Jainism service: Not on file     Active member of club or organization: Not on file     Attends meetings of clubs or organizations: Not on file     Relationship status: Not on file   Other Topics Concern    Not on file   Social History Narrative    Not on file     Family History   Problem Relation Age of Onset    Heart attack Father     Diabetes Father     Hypertension Father     Diabetes Mother     Hypertension Mother     Breast cancer Maternal Aunt     Colon cancer Maternal Uncle     Breast cancer Daughter     Ovarian cancer Neg Hx      OB History    Para Term  AB Living   2 2 2         SAB TAB Ectopic Multiple Live Births                  # Outcome Date GA Lbr Amandeep/2nd Weight Sex Delivery Anes PTL Lv   2 Term            1 Term                    Current Outpatient Medications   Medication Sig Dispense Refill    gabapentin (NEURONTIN) 300 MG capsule take 1 capsule by mouth four times a day      ibuprofen (ADVIL,MOTRIN) 600 MG tablet Take 1 tablet (600 mg total) by mouth every 6 (six) hours as needed for Pain (or fever). 20 tablet 0    ondansetron (ZOFRAN ODT) 8 MG TbDL Take 8 mg by mouth 3 (three) times daily as needed (for nausea and vomiting).       B.ANI/L.ACI/L.SAL/L.PLAN/L.GENO (PROBIOTIC FORMULA ORAL) Take 1 tablet by mouth once daily.       dexlansoprazole (DEXILANT) 60 mg capsule Take 60 mg by mouth once daily.      traMADol (ULTRAM) 50 mg tablet TAKE 1 TABLET PO Q 6 TO 8 H PRN FOR PAIN  0     No current facility-administered medications for this visit.      Allergies: Fentanyl; Lortab  [hydrocodone-acetaminophen]; and Phenothiazines     The 10-year ASCVD risk score (Nikosemi TREADWELL Jr., et al., 2013) is: 7.5%    Values used to calculate the score:      Age: 59 years      Sex: Female      Is Non- : No      Diabetic: No      Tobacco smoker: Yes      Systolic Blood Pressure: 108 mmHg      Is BP treated: No      HDL Cholesterol: 31 mg/dL      Total Cholesterol: 194 mg/dL      ROS:  Constitutional: no weight loss, weight gain, fever, fatigue  Eyes:  No vision changes, glasses/contacts  ENT/Mouth: No ulcers, sinus problems, ears ringing, headache  Cardiovascular: No inability to lie flat, chest pain, exercise intolerance, swelling, heart palpitations  Respiratory: No wheezing, coughing blood, shortness of breath, or cough  Gastrointestinal: No diarrhea, bloody stool, nausea/vomiting, constipation, gas, hemorrhoids  Genitourinary: No blood in urine, painful urination, urgency of urination, frequency of urination, incomplete emptying, incontinence, abnormal bleeding, painful periods, heavy periods, vaginal discharge, vaginal odor, painful intercourse, sexual problems, bleeding after intercourse.  Musculoskeletal: No muscle weakness, +chronic pain  Skin/Breast: No painful breasts, nipple discharge, masses, rash, ulcers  Neurological: No passing out, seizures, numbness, headache  Endocrine: No diabetes, hypothyroid, hyperthyroid, hot flashes, hair loss, abnormal hair growth, acne  Psychiatric: + depression, crying, +alcohol abuse  Hematologic: No bruises, bleeding, swollen lymph nodes, anemia.      Physical Exam:   Constitutional: She is oriented  to person, place, and time. She appears well-developed and well-nourished.      Neck: Normal range of motion. No tracheal deviation present. No thyromegaly present.    Cardiovascular: Exam reveals no edema.     Pulmonary/Chest: Effort normal. She exhibits no mass, no tenderness, no deformity and no retraction. Right breast exhibits no inverted nipple, no mass, no nipple discharge, no skin change, no tenderness, presence, no bleeding and no swelling. Left breast exhibits no inverted nipple, no mass, no nipple discharge, no skin change, no tenderness, presence, no bleeding and no swelling. Breasts are symmetrical.        Abdominal: Soft. She exhibits no distension and no mass. There is no tenderness. There is no rebound and no guarding. No hernia. Hernia confirmed negative in the left inguinal area.     Genitourinary: Rectal exam shows no external hemorrhoid. There is no rash, tenderness or lesion on the right labia. There is no rash, tenderness or lesion on the left labia. Uterus is absent. No no adexnal prolapse. Right adnexum displays no mass, no tenderness and no fullness. Left adnexum displays no mass, no tenderness and no fullness. No tenderness, bleeding, rectocele, cystocele or unspecified prolapse of vaginal walls in the vagina. No vaginal discharge found. Vaginal cuff normal.Cervix exhibits absence.           Musculoskeletal: Normal range of motion and moves all extremeties. She exhibits no edema.      Lymphadenopathy:        Right: No inguinal adenopathy present.        Left: No inguinal adenopathy present.    Neurological: She is alert and oriented to person, place, and time.    Skin: No rash noted. No erythema. No pallor.    Psychiatric: She has a normal mood and affect. Her behavior is normal. Judgment and thought content normal.         ASSESSMENT:   well woman    PLAN:   Mammogram  Counseled patient that I do not think hormone replacement therapy is indicated as she is not having any menopausal  symptoms.  She does not have vaginal dryness and I would not recommend vaginal hormonal medication at this time.  All of her questions and her sister's questions were answered to their satisfaction  Counseled her on the importance of yearly mammograms  Encouraged her to consider marital counseling with her   return annually or prn

## 2019-10-09 NOTE — TELEPHONE ENCOUNTER
Staff left a voicemail for patient spouse in regards to his message to have him return call to clinic.

## 2019-10-10 ENCOUNTER — HOSPITAL ENCOUNTER (OUTPATIENT)
Facility: OTHER | Age: 59
Discharge: HOME | End: 2019-10-10
Attending: ANESTHESIOLOGY | Admitting: ANESTHESIOLOGY
Payer: COMMERCIAL

## 2019-10-10 VITALS
DIASTOLIC BLOOD PRESSURE: 61 MMHG | HEART RATE: 62 BPM | OXYGEN SATURATION: 96 % | RESPIRATION RATE: 20 BRPM | SYSTOLIC BLOOD PRESSURE: 130 MMHG

## 2019-10-10 DIAGNOSIS — M70.71 ILIOPSOAS BURSITIS OF RIGHT HIP: ICD-10-CM

## 2019-10-10 DIAGNOSIS — M70.61 GREATER TROCHANTERIC BURSITIS OF RIGHT HIP: Primary | ICD-10-CM

## 2019-10-10 DIAGNOSIS — M70.60 GREATER TROCHANTERIC BURSITIS: ICD-10-CM

## 2019-10-10 PROCEDURE — 77002 NEEDLE LOCALIZATION BY XRAY: CPT | Mod: 26,,, | Performed by: ANESTHESIOLOGY

## 2019-10-10 PROCEDURE — 20550 NJX 1 TENDON SHEATH/LIGAMENT: CPT | Performed by: ANESTHESIOLOGY

## 2019-10-10 PROCEDURE — 25000003 PHARM REV CODE 250: Performed by: ANESTHESIOLOGY

## 2019-10-10 PROCEDURE — 20550 PR INJECT TENDON SHEATH/LIGAMENT: ICD-10-PCS | Mod: RT,,, | Performed by: ANESTHESIOLOGY

## 2019-10-10 PROCEDURE — 77002 PR FLUOROSCOPIC GUIDANCE NEEDLE PLACEMENT: ICD-10-PCS | Mod: 26,,, | Performed by: ANESTHESIOLOGY

## 2019-10-10 PROCEDURE — 20550 NJX 1 TENDON SHEATH/LIGAMENT: CPT | Mod: RT,,, | Performed by: ANESTHESIOLOGY

## 2019-10-10 PROCEDURE — 63600175 PHARM REV CODE 636 W HCPCS: Performed by: ANESTHESIOLOGY

## 2019-10-10 PROCEDURE — 20610 DRAIN/INJ JOINT/BURSA W/O US: CPT | Performed by: ANESTHESIOLOGY

## 2019-10-10 PROCEDURE — 77002 NEEDLE LOCALIZATION BY XRAY: CPT | Performed by: ANESTHESIOLOGY

## 2019-10-10 PROCEDURE — 25500020 PHARM REV CODE 255: Performed by: ANESTHESIOLOGY

## 2019-10-10 RX ORDER — ALPRAZOLAM 0.5 MG/1
0.5 TABLET ORAL ONCE
Status: COMPLETED | OUTPATIENT
Start: 2019-10-10 | End: 2019-10-10

## 2019-10-10 RX ORDER — BUPIVACAINE HYDROCHLORIDE 2.5 MG/ML
INJECTION, SOLUTION EPIDURAL; INFILTRATION; INTRACAUDAL
Status: DISCONTINUED | OUTPATIENT
Start: 2019-10-10 | End: 2019-10-10 | Stop reason: HOSPADM

## 2019-10-10 RX ORDER — SODIUM CHLORIDE 9 MG/ML
500 INJECTION, SOLUTION INTRAVENOUS CONTINUOUS
Status: DISCONTINUED | OUTPATIENT
Start: 2019-10-11 | End: 2019-10-10 | Stop reason: HOSPADM

## 2019-10-10 RX ORDER — LIDOCAINE HYDROCHLORIDE 10 MG/ML
INJECTION INFILTRATION; PERINEURAL
Status: DISCONTINUED | OUTPATIENT
Start: 2019-10-10 | End: 2019-10-10 | Stop reason: HOSPADM

## 2019-10-10 RX ORDER — TRIAMCINOLONE ACETONIDE 40 MG/ML
INJECTION, SUSPENSION INTRA-ARTICULAR; INTRAMUSCULAR
Status: DISCONTINUED | OUTPATIENT
Start: 2019-10-10 | End: 2019-10-10 | Stop reason: HOSPADM

## 2019-10-10 RX ADMIN — ALPRAZOLAM 0.5 MG: 0.5 TABLET ORAL at 09:10

## 2019-10-10 NOTE — OP NOTE
27- Hip Injection/Arthrogram  ANTERIOR APPROACH  Time-out taken to identify patient and procedure side prior to starting the procedure.   I attest that I have reviewed the patient's home medications prior to the procedure and no contraindication have been identified. I  re-evaluated the patient after the patient was positioned for the procedure in the procedure room immediately before the procedural time-out. The vital signs are current and represent the current state of the patient which has not significantly changed since the preprocedure assessment.                                                                                                                         Date of Service: 10/10/2019    PCP: Dorota Galvin MD    Referring Physician:                                                                                             PROCEDURE:  Right iliopsoas bursa/tendon sheath and gluteal tendon sheath under fluoroscopy.    REASON FOR PROCEDURE: Gluteal tendinitis, right hip [M76.01]  Psoas tendinitis, right hip [M76.11]     1. Greater trochanteric bursitis of right hip    2. Iliopsoas bursitis of right hip    3. Greater trochanteric bursitis      POSTOP DIAGNOSIS: Gluteal tendinitis, right hip [M76.01]  Psoas tendinitis, right hip [M76.11]  1. Greater trochanteric bursitis of right hip    2. Iliopsoas bursitis of right hip    3. Greater trochanteric bursitis        PHYSICIAN: Guillaume Rico MD  ASSISTANTS: Levar Veras MD resident                                                                                                ANESTHESIA:  Xylocaine 1% 9ml with Sodium Bicarbonate 1ml. 3ml per site.                                                                                                              MEDICATIONS INJECTED:  Kenalog 20mg, bupivacaine 0.25% 2 mL (per side), and sterile saline 2ml (per side)                                                                                                                                      ESTIMATED BLOOD LOSS:  None.                                                                                                                              COMPLICATIONS:  None.     SEDATION: None                                                                                                                                    TECHNIQUE:  With the patient lying in the supine position the the femoral neck identified under fluoroscopy.  The area was prepped and draped in the usual       sterile fashion.  Local anesthetic was used, given by raising a wheal and    going down to the hub of the 27-gauge needle.  A 3-1/2 inch 22-gauge         needle was introduced under fluoroscopy until the tip reached the lateral aspect of the femoral neck.  When the tip of the needle was thought   to be in appropriate position, contrast was injected to confirm proper placement.  Medication was then injected slowly.  The patient tolerated the procedure well.                                                                                                                     PAIN BEFORE THE PROCEDURE: 9/10.                                                                                                                         PAIN AFTER THE PROCEDURE:  4/10.                                                                                                                          The patient was monitored for 20-30 minutes after the procedure and was      given post procedure and discharge instructions to follow at home.  The      patient is to follow-up with me in two weeks by calling.   The patient was discharged in a stable condtion.

## 2019-10-10 NOTE — DISCHARGE SUMMARY
Discharge Note  Short Stay      SUMMARY     Admit Date: 10/10/2019    Attending Physician: Guillaume Rico      Discharge Physician: Guillaume Rico      Discharge Date: 10/10/2019 10:18 AM    Procedure(s) (LRB):  Injection, Joint RIGHT ILIOPSOAS BURSA/TENDON INJECTION AND RIGHT GLUTEAL TENDON INJECTION WITH STEROID AND LIDOCAINE (Right)    Final Diagnosis: Gluteal tendinitis, right hip [M76.01]  Psoas tendinitis, right hip [M76.11]    Disposition: Home or self care    Patient Instructions:   Current Discharge Medication List      CONTINUE these medications which have NOT CHANGED    Details   B.ANI/L.ACI/L.SAL/L.PLAN/L.GENO (PROBIOTIC FORMULA ORAL) Take 1 tablet by mouth once daily.       dexlansoprazole (DEXILANT) 60 mg capsule Take 60 mg by mouth once daily.      gabapentin (NEURONTIN) 300 MG capsule take 1 capsule by mouth four times a day      ibuprofen (ADVIL,MOTRIN) 600 MG tablet Take 1 tablet (600 mg total) by mouth every 6 (six) hours as needed for Pain (or fever).  Qty: 20 tablet, Refills: 0      ondansetron (ZOFRAN ODT) 8 MG TbDL Take 8 mg by mouth 3 (three) times daily as needed (for nausea and vomiting).      traMADol (ULTRAM) 50 mg tablet TAKE 1 TABLET PO Q 6 TO 8 H PRN FOR PAIN  Refills: 0    Comments: Quantity prescribed more than 7 day supply? Press F2 and select one:[id number]                   Discharge Diagnosis: Gluteal tendinitis, right hip [M76.01]  Psoas tendinitis, right hip [M76.11]  Condition on Discharge: Stable with no complications to procedure   Diet on Discharge: Same as before.  Activity: as per instruction sheet.  Discharge to: Home with a responsible adult.  Follow up: 2-4 weeks       Please call my office or pager at [phone] if experienced any weakness or loss of sensation, fever > 101.5, pain uncontrolled with oral medications, persistent nausea/vomiting/or diarrhea, redness or drainage from the incisions, or any other worrisome concerns. If physician on call was not reached or could not  communicate with our office for any reason please go to the nearest emergency department

## 2019-10-10 NOTE — DISCHARGE INSTRUCTIONS

## 2019-10-10 NOTE — H&P
HPI  Patient presenting for Procedure(s) (LRB):  Injection, Joint RIGHT ILIOPSOAS BURSA/TENDON INJECTION AND RIGHT GLUTEAL TENDON INJECTION WITH STEROID AND LIDOCAINE (Right)     Patient on Anti-coagulation No    No health changes since previous encounter    Past Medical History:   Diagnosis Date    Anemia     Anemia     Depression     Diverticulitis     Fatty liver     GERD (gastroesophageal reflux disease)     Hyperlipidemia     Hypertension     Pancreatitis     Peptic ulcer disease     Polysubstance abuse     Posterior reversible encephalopathy syndrome     Sarcoidosis of lung     Sarcoidosis of lung     over 30 yrs ago    Seizures     7/2017    Suicide attempt     Suicide ideation      Past Surgical History:   Procedure Laterality Date    APPENDECTOMY      COLONOSCOPY N/A 7/28/2017    Procedure: COLONOSCOPY;  Surgeon: Aaron Alvarado MD;  [facility]: [facility];  Service: Endoscopy;  Laterality: N/A;    ESOPHAGOGASTRODUODENOSCOPY  10/7/2016, 11/6/2014 2016 - gastritis, duodenitis, 2014 erosive gastritis    FLEXIBLE SIGMOIDOSCOPY  11/06/2014    colitis    HYSTERECTOMY      mediastenoscopy      TONSILLECTOMY N/A 1970    TUBAL LIGATION       Review of patient's allergies indicates:   Allergen Reactions    Fentanyl Other (See Comments)     Other reaction(s): Itching    Lortab  [hydrocodone-acetaminophen] Other (See Comments)    Phenothiazines       No current facility-administered medications for this encounter.        PMHx, PSHx, Allergies, Medications reviewed in epic    ROS negative except pain complaints in HPI    OBJECTIVE:    There were no vitals taken for this visit.    PHYSICAL EXAMINATION:    GENERAL: Well appearing, in no acute distress, alert and oriented x3.  PSYCH:  Mood and affect appropriate.  SKIN: Skin color, texture, turgor normal, no rashes or lesions which will impact the procedure.  CV: RRR with palpation of the radial artery.  PULM: No evidence of respiratory  difficulty, symmetric chest rise. Clear to auscultation.  NEURO: Cranial nerves grossly intact.    Plan:    Proceed with procedure as planned Procedure(s) (LRB):  Injection, Joint RIGHT ILIOPSOAS BURSA/TENDON INJECTION AND RIGHT GLUTEAL TENDON INJECTION WITH STEROID AND LIDOCAINE (Right)    Levar Veras  10/10/2019

## 2019-10-25 ENCOUNTER — HOSPITAL ENCOUNTER (INPATIENT)
Facility: OTHER | Age: 59
LOS: 2 days | Discharge: PSYCHIATRIC OR SUBSTANCE USE FACILITY | DRG: 914 | End: 2019-10-28
Attending: EMERGENCY MEDICINE | Admitting: HOSPITALIST
Payer: COMMERCIAL

## 2019-10-25 DIAGNOSIS — D69.6 THROMBOCYTOPENIA: ICD-10-CM

## 2019-10-25 DIAGNOSIS — R05.9 COUGH: ICD-10-CM

## 2019-10-25 DIAGNOSIS — F41.8 DEPRESSION WITH ANXIETY: ICD-10-CM

## 2019-10-25 DIAGNOSIS — F19.11 HISTORY OF SUBSTANCE ABUSE: ICD-10-CM

## 2019-10-25 DIAGNOSIS — F32.A DEPRESSION, UNSPECIFIED DEPRESSION TYPE: ICD-10-CM

## 2019-10-25 DIAGNOSIS — D86.0 SARCOIDOSIS OF LUNG: ICD-10-CM

## 2019-10-25 DIAGNOSIS — I10 ESSENTIAL HYPERTENSION: ICD-10-CM

## 2019-10-25 DIAGNOSIS — Z72.0 TOBACCO ABUSE: ICD-10-CM

## 2019-10-25 DIAGNOSIS — F10.931 DELIRIUM TREMENS: ICD-10-CM

## 2019-10-25 DIAGNOSIS — F19.10 SUBSTANCE ABUSE: ICD-10-CM

## 2019-10-25 DIAGNOSIS — F10.932 ALCOHOL WITHDRAWAL SYNDROME WITH PERCEPTUAL DISTURBANCE: ICD-10-CM

## 2019-10-25 DIAGNOSIS — R09.02 HYPOXIA: ICD-10-CM

## 2019-10-25 DIAGNOSIS — M79.7 FIBROMYALGIA: ICD-10-CM

## 2019-10-25 DIAGNOSIS — F10.20 ALCOHOL DEPENDENCE, CONTINUOUS: ICD-10-CM

## 2019-10-25 DIAGNOSIS — R45.851 SUICIDAL IDEATION: Primary | ICD-10-CM

## 2019-10-25 DIAGNOSIS — T50.902A INTENTIONAL DRUG OVERDOSE, INITIAL ENCOUNTER: ICD-10-CM

## 2019-10-25 DIAGNOSIS — B36.9 FUNGAL INFECTION OF SKIN: ICD-10-CM

## 2019-10-25 LAB
ALBUMIN SERPL BCP-MCNC: 3.9 G/DL (ref 3.5–5.2)
ALP SERPL-CCNC: 51 U/L (ref 55–135)
ALT SERPL W/O P-5'-P-CCNC: 15 U/L (ref 10–44)
ANION GAP SERPL CALC-SCNC: 14 MMOL/L (ref 8–16)
APAP SERPL-MCNC: <3 UG/ML (ref 10–20)
AST SERPL-CCNC: 29 U/L (ref 10–40)
BASOPHILS # BLD AUTO: 0.02 K/UL (ref 0–0.2)
BASOPHILS NFR BLD: 0.4 % (ref 0–1.9)
BILIRUB SERPL-MCNC: 0.4 MG/DL (ref 0.1–1)
BUN SERPL-MCNC: 8 MG/DL (ref 8–23)
CALCIUM SERPL-MCNC: 8.1 MG/DL (ref 8.7–10.5)
CHLORIDE SERPL-SCNC: 108 MMOL/L (ref 95–110)
CO2 SERPL-SCNC: 23 MMOL/L (ref 23–29)
CREAT SERPL-MCNC: 0.8 MG/DL (ref 0.5–1.4)
DIFFERENTIAL METHOD: ABNORMAL
EOSINOPHIL # BLD AUTO: 0 K/UL (ref 0–0.5)
EOSINOPHIL NFR BLD: 0.5 % (ref 0–8)
ERYTHROCYTE [DISTWIDTH] IN BLOOD BY AUTOMATED COUNT: 16.8 % (ref 11.5–14.5)
EST. GFR  (AFRICAN AMERICAN): >60 ML/MIN/1.73 M^2
EST. GFR  (NON AFRICAN AMERICAN): >60 ML/MIN/1.73 M^2
ETHANOL SERPL-MCNC: 284 MG/DL
GLUCOSE SERPL-MCNC: 91 MG/DL (ref 70–110)
HCT VFR BLD AUTO: 38.3 % (ref 37–48.5)
HGB BLD-MCNC: 11.9 G/DL (ref 12–16)
IMM GRANULOCYTES # BLD AUTO: 0.02 K/UL (ref 0–0.04)
IMM GRANULOCYTES NFR BLD AUTO: 0.4 % (ref 0–0.5)
LYMPHOCYTES # BLD AUTO: 3 K/UL (ref 1–4.8)
LYMPHOCYTES NFR BLD: 54.4 % (ref 18–48)
MCH RBC QN AUTO: 27.2 PG (ref 27–31)
MCHC RBC AUTO-ENTMCNC: 31.1 G/DL (ref 32–36)
MCV RBC AUTO: 87 FL (ref 82–98)
MONOCYTES # BLD AUTO: 0.4 K/UL (ref 0.3–1)
MONOCYTES NFR BLD: 7.8 % (ref 4–15)
NEUTROPHILS # BLD AUTO: 2 K/UL (ref 1.8–7.7)
NEUTROPHILS NFR BLD: 36.5 % (ref 38–73)
NRBC BLD-RTO: 0 /100 WBC
PLATELET # BLD AUTO: 69 K/UL (ref 150–350)
PMV BLD AUTO: 10.5 FL (ref 9.2–12.9)
POTASSIUM SERPL-SCNC: 3.3 MMOL/L (ref 3.5–5.1)
PROT SERPL-MCNC: 6.8 G/DL (ref 6–8.4)
RBC # BLD AUTO: 4.38 M/UL (ref 4–5.4)
SODIUM SERPL-SCNC: 145 MMOL/L (ref 136–145)
TSH SERPL DL<=0.005 MIU/L-ACNC: 1.69 UIU/ML (ref 0.4–4)
WBC # BLD AUTO: 5.51 K/UL (ref 3.9–12.7)

## 2019-10-25 PROCEDURE — 93005 ELECTROCARDIOGRAM TRACING: CPT

## 2019-10-25 PROCEDURE — 99285 EMERGENCY DEPT VISIT HI MDM: CPT | Mod: 25

## 2019-10-25 PROCEDURE — 85025 COMPLETE CBC W/AUTO DIFF WBC: CPT

## 2019-10-25 PROCEDURE — 93010 ELECTROCARDIOGRAM REPORT: CPT | Mod: ,,, | Performed by: INTERNAL MEDICINE

## 2019-10-25 PROCEDURE — 80053 COMPREHEN METABOLIC PANEL: CPT

## 2019-10-25 PROCEDURE — 63600175 PHARM REV CODE 636 W HCPCS: Performed by: EMERGENCY MEDICINE

## 2019-10-25 PROCEDURE — 80320 DRUG SCREEN QUANTALCOHOLS: CPT

## 2019-10-25 PROCEDURE — 80329 ANALGESICS NON-OPIOID 1 OR 2: CPT

## 2019-10-25 PROCEDURE — 84443 ASSAY THYROID STIM HORMONE: CPT

## 2019-10-25 PROCEDURE — 93010 EKG 12-LEAD: ICD-10-PCS | Mod: ,,, | Performed by: INTERNAL MEDICINE

## 2019-10-25 RX ORDER — OMEPRAZOLE 20 MG/1
60 CAPSULE, DELAYED RELEASE ORAL DAILY
COMMUNITY
End: 2021-02-22 | Stop reason: DRUGHIGH

## 2019-10-25 RX ORDER — FLUOXETINE 10 MG/1
10 TABLET ORAL DAILY
Status: ON HOLD | COMMUNITY
End: 2019-10-28 | Stop reason: HOSPADM

## 2019-10-25 RX ORDER — HYDROCODONE BITARTRATE AND ACETAMINOPHEN 10; 325 MG/1; MG/1
1 TABLET ORAL
Status: ON HOLD | COMMUNITY
End: 2019-10-28 | Stop reason: HOSPADM

## 2019-10-25 RX ORDER — METAXALONE 800 MG/1
800 TABLET ORAL 3 TIMES DAILY
Status: ON HOLD | COMMUNITY
End: 2019-10-28 | Stop reason: HOSPADM

## 2019-10-25 RX ORDER — ARIPIPRAZOLE 15 MG/1
15 TABLET, ORALLY DISINTEGRATING ORAL DAILY
Status: ON HOLD | COMMUNITY
End: 2019-10-28 | Stop reason: HOSPADM

## 2019-10-25 RX ORDER — DOXEPIN HYDROCHLORIDE 150 MG/1
25 CAPSULE ORAL NIGHTLY
Status: ON HOLD | COMMUNITY
End: 2019-10-28 | Stop reason: HOSPADM

## 2019-10-25 RX ORDER — TIZANIDINE HYDROCHLORIDE 4 MG/1
CAPSULE, GELATIN COATED ORAL
Status: ON HOLD | COMMUNITY
End: 2019-10-28 | Stop reason: HOSPADM

## 2019-10-25 RX ADMIN — SODIUM CHLORIDE 1000 ML: 0.9 INJECTION, SOLUTION INTRAVENOUS at 10:10

## 2019-10-26 PROBLEM — D69.6 THROMBOCYTOPENIA: Status: ACTIVE | Noted: 2019-10-26

## 2019-10-26 PROBLEM — R45.851 SUICIDAL IDEATION: Status: ACTIVE | Noted: 2019-10-26

## 2019-10-26 LAB
AMPHET+METHAMPHET UR QL: NEGATIVE
BARBITURATES UR QL SCN>200 NG/ML: NEGATIVE
BENZODIAZ UR QL SCN>200 NG/ML: NEGATIVE
BILIRUB UR QL STRIP: NEGATIVE
BZE UR QL SCN: NEGATIVE
CANNABINOIDS UR QL SCN: NEGATIVE
CLARITY UR: CLEAR
COLOR UR: YELLOW
CREAT UR-MCNC: 77.6 MG/DL (ref 15–325)
ETHANOL SERPL-MCNC: 98 MG/DL
GLUCOSE UR QL STRIP: NEGATIVE
HGB UR QL STRIP: NEGATIVE
KETONES UR QL STRIP: NEGATIVE
LEUKOCYTE ESTERASE UR QL STRIP: NEGATIVE
METHADONE UR QL SCN>300 NG/ML: NEGATIVE
NITRITE UR QL STRIP: NEGATIVE
OPIATES UR QL SCN: NEGATIVE
PCP UR QL SCN>25 NG/ML: NEGATIVE
PH UR STRIP: 6 [PH] (ref 5–8)
PROT UR QL STRIP: NEGATIVE
SP GR UR STRIP: 1.02 (ref 1–1.03)
TOXICOLOGY INFORMATION: NORMAL
URN SPEC COLLECT METH UR: NORMAL
UROBILINOGEN UR STRIP-ACNC: NEGATIVE EU/DL

## 2019-10-26 PROCEDURE — 11000001 HC ACUTE MED/SURG PRIVATE ROOM

## 2019-10-26 PROCEDURE — 99223 1ST HOSP IP/OBS HIGH 75: CPT | Mod: ,,, | Performed by: HOSPITALIST

## 2019-10-26 PROCEDURE — 25000003 PHARM REV CODE 250: Performed by: EMERGENCY MEDICINE

## 2019-10-26 PROCEDURE — 63600175 PHARM REV CODE 636 W HCPCS: Performed by: EMERGENCY MEDICINE

## 2019-10-26 PROCEDURE — 99223 PR INITIAL HOSPITAL CARE,LEVL III: ICD-10-PCS | Mod: ,,, | Performed by: HOSPITALIST

## 2019-10-26 PROCEDURE — 63600175 PHARM REV CODE 636 W HCPCS: Performed by: HOSPITALIST

## 2019-10-26 PROCEDURE — 25000242 PHARM REV CODE 250 ALT 637 W/ HCPCS: Performed by: EMERGENCY MEDICINE

## 2019-10-26 PROCEDURE — 80320 DRUG SCREEN QUANTALCOHOLS: CPT

## 2019-10-26 PROCEDURE — 94761 N-INVAS EAR/PLS OXIMETRY MLT: CPT

## 2019-10-26 PROCEDURE — 27000221 HC OXYGEN, UP TO 24 HOURS

## 2019-10-26 PROCEDURE — 25000003 PHARM REV CODE 250: Performed by: HOSPITALIST

## 2019-10-26 PROCEDURE — 80307 DRUG TEST PRSMV CHEM ANLYZR: CPT

## 2019-10-26 PROCEDURE — 81003 URINALYSIS AUTO W/O SCOPE: CPT | Mod: 59

## 2019-10-26 PROCEDURE — 94640 AIRWAY INHALATION TREATMENT: CPT

## 2019-10-26 RX ORDER — ACETAMINOPHEN 325 MG/1
650 TABLET ORAL EVERY 6 HOURS PRN
Status: DISCONTINUED | OUTPATIENT
Start: 2019-10-26 | End: 2019-10-28 | Stop reason: HOSPADM

## 2019-10-26 RX ORDER — LABETALOL HYDROCHLORIDE 5 MG/ML
10 INJECTION, SOLUTION INTRAVENOUS EVERY 6 HOURS PRN
Status: DISCONTINUED | OUTPATIENT
Start: 2019-10-26 | End: 2019-10-28 | Stop reason: HOSPADM

## 2019-10-26 RX ORDER — LEVOTHYROXINE SODIUM 50 UG/1
50 TABLET ORAL
Status: DISCONTINUED | OUTPATIENT
Start: 2019-10-27 | End: 2019-10-28 | Stop reason: HOSPADM

## 2019-10-26 RX ORDER — DIAZEPAM 5 MG/1
10 TABLET ORAL
Status: COMPLETED | OUTPATIENT
Start: 2019-10-26 | End: 2019-10-26

## 2019-10-26 RX ORDER — SODIUM CHLORIDE 0.9 % (FLUSH) 0.9 %
10 SYRINGE (ML) INJECTION
Status: DISCONTINUED | OUTPATIENT
Start: 2019-10-26 | End: 2019-10-28 | Stop reason: HOSPADM

## 2019-10-26 RX ORDER — ONDANSETRON 2 MG/ML
4 INJECTION INTRAMUSCULAR; INTRAVENOUS EVERY 6 HOURS PRN
Status: DISCONTINUED | OUTPATIENT
Start: 2019-10-26 | End: 2019-10-28 | Stop reason: HOSPADM

## 2019-10-26 RX ORDER — DIPHENHYDRAMINE HCL 25 MG
25 CAPSULE ORAL EVERY 6 HOURS PRN
Status: DISCONTINUED | OUTPATIENT
Start: 2019-10-26 | End: 2019-10-28 | Stop reason: HOSPADM

## 2019-10-26 RX ORDER — POLYETHYLENE GLYCOL 3350 17 G/17G
17 POWDER, FOR SOLUTION ORAL 2 TIMES DAILY PRN
Status: DISCONTINUED | OUTPATIENT
Start: 2019-10-26 | End: 2019-10-28 | Stop reason: HOSPADM

## 2019-10-26 RX ORDER — MIRTAZAPINE 7.5 MG/1
7.5 TABLET, FILM COATED ORAL NIGHTLY PRN
Status: DISCONTINUED | OUTPATIENT
Start: 2019-10-26 | End: 2019-10-28 | Stop reason: HOSPADM

## 2019-10-26 RX ORDER — BENZONATATE 100 MG/1
100 CAPSULE ORAL 3 TIMES DAILY PRN
Status: DISCONTINUED | OUTPATIENT
Start: 2019-10-26 | End: 2019-10-28 | Stop reason: HOSPADM

## 2019-10-26 RX ORDER — LOPERAMIDE HYDROCHLORIDE 2 MG/1
4 CAPSULE ORAL
Status: COMPLETED | OUTPATIENT
Start: 2019-10-26 | End: 2019-10-26

## 2019-10-26 RX ORDER — DIAZEPAM 5 MG/1
10 TABLET ORAL EVERY 8 HOURS
Status: DISCONTINUED | OUTPATIENT
Start: 2019-10-26 | End: 2019-10-28 | Stop reason: HOSPADM

## 2019-10-26 RX ORDER — LEVALBUTEROL 1.25 MG/.5ML
1.25 SOLUTION, CONCENTRATE RESPIRATORY (INHALATION)
Status: COMPLETED | OUTPATIENT
Start: 2019-10-26 | End: 2019-10-26

## 2019-10-26 RX ADMIN — LORAZEPAM 1 MG: 2 INJECTION INTRAMUSCULAR; INTRAVENOUS at 11:10

## 2019-10-26 RX ADMIN — LOPERAMIDE HYDROCHLORIDE 4 MG: 2 CAPSULE ORAL at 10:10

## 2019-10-26 RX ADMIN — LEVALBUTEROL 1.25 MG: 1.25 SOLUTION, CONCENTRATE RESPIRATORY (INHALATION) at 08:10

## 2019-10-26 RX ADMIN — DIAZEPAM 10 MG: 5 TABLET ORAL at 09:10

## 2019-10-26 RX ADMIN — FOLIC ACID: 5 INJECTION, SOLUTION INTRAMUSCULAR; INTRAVENOUS; SUBCUTANEOUS at 03:10

## 2019-10-26 RX ADMIN — DIAZEPAM 10 MG: 5 TABLET ORAL at 03:10

## 2019-10-26 RX ADMIN — SODIUM CHLORIDE 1000 ML: 0.9 INJECTION, SOLUTION INTRAVENOUS at 09:10

## 2019-10-26 NOTE — ASSESSMENT & PLAN NOTE
Patient with longstanding depression since May of 2019 due to loss of her son  Patient has been self medicating with alcohol, with to suicide ideation for some  However, this her 1st attempt with overdose ingestion of pills  Unknown quantity medications consumed  Will continue to hold all of her psychiatric medications this time  She has been PEC'd and requires one-to-one observation  When medically cleared, she will need inpatient psychiatric treatment

## 2019-10-26 NOTE — ED NOTES
Pt requesting medication for diarrhea. Pt with mild improvement of shaking. Pt AAOx4, calm, cooperative and appropriate at this time. Respirations even and unlabored. No acute distress noted. Pt and pt family member, ,  aware of POC. No nausea reported, no vomiting at present.

## 2019-10-26 NOTE — ED NOTES
PEC faxed to CPT at this time, original PEC scanned into chart per registration per hospital/CPT process.

## 2019-10-26 NOTE — ED NOTES
Hourly rounding completed. Pt sitting up in ED stretcher, remains shaking, speaking in clear, fluent sentences, skin warm and dry. Edwin LAMBERT, ED sitter remains at BS in direct visual contact of pt. Per PEC precautions.  Bed is locked and in lowest position with side rails up x2.

## 2019-10-26 NOTE — ED NOTES
Pt remains in policy paper scrubs, resting in stretcher comfortably. Pt remains calm and cooperative with staff, aaox4, speaking approprietly in clear full sentences at this time. Respirations remain spontaneous, even and non labored. Toileting needs addressed at this time, pt states will call nurse for assistance. Patient denies pains or needs at this time. No signs of distress noted at this time. ED sitterLyn, remains at bedside in direct visual contact, charting per protocol every 15 minutes. No equipment or belongings are in the patients room. Pt aware of plan of current care. Will continue to monitor closely.

## 2019-10-26 NOTE — ASSESSMENT & PLAN NOTE
Last alcoholic beverage was last night with previous history of withdrawals in the past  Patient start tap tremors that started in the ER this morning  Will initiate treatment Valium p.o. q.d. 8 hr and taper according to symptoms  Will have Ativan p.r.n.  Supplement with thiamine and folic acid  Counseled on cessation   for community resources

## 2019-10-26 NOTE — H&P
[facility] Medicine  History & Physical    Patient Name: Earl Abdul  MRN: [id number]  Admission Date: 10/25/2019  Attending Physician: Radha Martin MD   Primary Care Provider: Dorota Galvin MD         Patient information was obtained from patient, past medical records and ER records.     Subjective:     Principal Problem:Suicidal ideation    Chief Complaint:   Chief Complaint   Patient presents with    Ingestion     Pt took a handfull of pills including trazadone, with alcohol        HPI: 59-year-old female with HTN, HLP, sarcoidosis, polysubstance abuse (most notably alcohol and prescribed drugs), depression who presented with suicide attempt with handful of pills.  Patient is of for depression since May of 2019 when she lost her son.  This 16 is the date of his death, and every time around that time a month she reports she has difficulty with having suicidal ideation.  She stated that she wanted the pain to to stop, and for all of his to and and as why she took those pills.  It is reportedly handful of Neurontin, Skelaxin and trazodone.  She has had bouts of alcohol withdrawal in the past and has recently undergone rehab in September of this year.  She was successful with being sober for 2 months before she relapsed.  Last alcoholic beverage was last night, approximately 8:00 p.m. she drank approximately half a bottle of vodka.  In the ER, workup with blood alcohol level 284, negative acetaminophen, U tox negative, UA and chest x-ray are unremarkable, CMP with potassium of 3.3 otherwise normal, and platelets of 69.  She was placed under PEC.  While in the ER, she started have tremors.    Past Medical History:   Diagnosis Date    Anemia     Anemia     Depression     Diverticulitis     Fatty liver     GERD (gastroesophageal reflux disease)     Hyperlipidemia     Hypertension     Pancreatitis     Peptic ulcer disease     Polysubstance abuse     Posterior  reversible encephalopathy syndrome     Sarcoidosis of lung     Sarcoidosis of lung     over 30 yrs ago    Seizures     7/2017    Suicide attempt     Suicide ideation        Past Surgical History:   Procedure Laterality Date    APPENDECTOMY      COLONOSCOPY N/A 7/28/2017    Procedure: COLONOSCOPY;  Surgeon: Aaron Alvarado MD;  Location: [facility] ENDO;  Service: Endoscopy;  Laterality: N/A;    ESOPHAGOGASTRODUODENOSCOPY  10/7/2016, 11/6/2014    2016 - gastritis, duodenitis, 2014 erosive gastritis    FLEXIBLE SIGMOIDOSCOPY  11/06/2014    colitis    HYSTERECTOMY      INJECTION OF JOINT Right 10/10/2019    Procedure: Injection, Joint RIGHT ILIOPSOAS BURSA/TENDON INJECTION AND RIGHT GLUTEAL TENDON INJECTION WITH STEROID AND LIDOCAINE;  Surgeon: Guillaume Rico MD;  Location: [facility] PAIN MGT;  Service: Pain Management;  Laterality: Right;  NEEDS CONSENT    mediastenoscopy      TONSILLECTOMY N/A 1970    TUBAL LIGATION         Review of patient's allergies indicates:   Allergen Reactions    Fentanyl Other (See Comments)     Other reaction(s): Itching    Lortab  [hydrocodone-acetaminophen] Other (See Comments)    Phenothiazines        No current facility-administered medications on file prior to encounter.      Current Outpatient Medications on File Prior to Encounter   Medication Sig    ARIPiprazole (ABILIFY) 15 MG disintegrating tablet Take 15 mg by mouth once daily.    doxepin (SINEQUAN) 150 MG Cap Take 25 mg by mouth every evening.    FLUoxetine 10 MG Tab Take 10 mg by mouth once daily.    gabapentin (NEURONTIN) 300 MG capsule take 1 capsule by mouth four times a day    HYDROcodone-acetaminophen (NORCO)  mg per tablet Take 1 tablet by mouth.    levothyroxine sodium (SYNTHROID ORAL) Take by mouth.    metaxalone (SKELAXIN) 800 MG tablet Take 800 mg by mouth 3 (three) times daily.    omeprazole (PRILOSEC) 10 MG capsule Take 10 mg by mouth once daily.    tiZANidine 4 mg Cap Take by mouth.    trazodone  HCl (TRAZODONE ORAL) Take by mouth.    B.ANI/L.ACI/L.SAL/L.PLAN/L.GENO (PROBIOTIC FORMULA ORAL) Take 1 tablet by mouth once daily.     dexlansoprazole (DEXILANT) 60 mg capsule Take 60 mg by mouth once daily.    ibuprofen (ADVIL,MOTRIN) 600 MG tablet Take 1 tablet (600 mg total) by mouth every 6 (six) hours as needed for Pain (or fever).    ondansetron (ZOFRAN ODT) 8 MG TbDL Take 8 mg by mouth 3 (three) times daily as needed (for nausea and vomiting).    traMADol (ULTRAM) 50 mg tablet TAKE 1 TABLET PO Q 6 TO 8 H PRN FOR PAIN     Family History     Problem Relation (Age of Onset)    Breast cancer Maternal Aunt, Daughter    Colon cancer Maternal Uncle    Diabetes Father, Mother    Heart attack Father    Hypertension Father, Mother        Tobacco Use    Smoking status: Current Every Day Smoker     Packs/day: 1.00     Years: 30.00     Pack years: 30.00     Types: Cigarettes    Smokeless tobacco: Never Used   Substance and Sexual Activity    Alcohol use: Yes    Drug use: No    Sexual activity: Yes     Birth control/protection: Surgical     Review of Systems   Constitutional: Positive for fever.   HENT: Negative for sore throat.    Eyes: Negative for visual disturbance.   Respiratory: Negative for shortness of breath.    Cardiovascular: Negative for chest pain.   Gastrointestinal: Negative for abdominal pain, nausea and vomiting.   Endocrine: Negative for polyphagia.   Genitourinary: Negative for dysuria.   Musculoskeletal:        Left arm and side pain after fall   Skin: Negative for rash.   Neurological: Positive for tremors. Negative for syncope, speech difficulty and headaches.   Hematological:        Bruise to left arm   Psychiatric/Behavioral: Positive for agitation, self-injury and suicidal ideas. The patient is nervous/anxious.      Objective:     Vital Signs (Most Recent):  Temp: 99 °F (37.2 °C) (10/26/19 1335)  Pulse: 84 (10/26/19 1335)  Resp: 18 (10/26/19 1335)  BP: (!) 146/72 (10/26/19 1335)  SpO2: 97  % (10/26/19 1335) Vital Signs (24h Range):  Temp:  [97.9 °F (36.6 °C)-99 °F (37.2 °C)] 99 °F (37.2 °C)  Pulse:  [] 84  Resp:  [14-18] 18  SpO2:  [88 %-98 %] 97 %  BP: (106-154)/(54-86) 146/72     Weight: 80.7 kg (178 lb)  Body mass index is 28.73 kg/m².    Physical Exam   Constitutional: She is oriented to person, place, and time. She appears well-developed. No distress.   HENT:   Head: Normocephalic and atraumatic.   Eyes: Pupils are equal, round, and reactive to light. Conjunctivae and EOM are normal.   Neck: Normal range of motion.   Cardiovascular: Normal rate and regular rhythm.   Pulmonary/Chest: Effort normal and breath sounds normal.   Abdominal: Soft. Bowel sounds are normal. She exhibits no distension and no mass. There is no tenderness. There is no rebound and no guarding.   Musculoskeletal: Normal range of motion. She exhibits no edema.   Left arm ecchymoses   Neurological: She is alert and oriented to person, place, and time.   Skin: Skin is warm and dry. Capillary refill takes less than 2 seconds. She is not diaphoretic.   Psychiatric: Her mood appears anxious. She expresses impulsivity. She expresses suicidal ideation. She expresses no homicidal ideation. She expresses no homicidal plans.   Nursing note and vitals reviewed.        CRANIAL NERVES     CN III, IV, VI   Pupils are equal, round, and reactive to light.  Extraocular motions are normal.        Significant Labs: All pertinent labs within the past 24 hours have been reviewed.    Significant Imaging: I have reviewed and interpreted all pertinent imaging results/findings within the past 24 hours.    Assessment/Plan:     * Suicidal ideation  Patient with longstanding depression since May of 2019 due to loss of her son  Patient has been self medicating with alcohol, with to suicide ideation for some  However, this her 1st attempt with overdose ingestion of pills  Unknown quantity medications consumed  Will continue to hold all of her  psychiatric medications this time  She has been PEC'd and requires one-to-one observation  When medically cleared, she will need inpatient psychiatric treatment    Alcohol withdrawal  Last alcoholic beverage was last night with previous history of withdrawals in the past  Patient start tap tremors that started in the ER this morning  Will initiate treatment Valium p.o. q.d. 8 hr and taper according to symptoms  Will have Ativan p.r.n.  Supplement with thiamine and folic acid  Counseled on cessation   for community resources    Thrombocytopenia  Chronic, likely related to marrow suppression secondary to ETOH  No signs of bleeding  Will continue monitor    Depression with anxiety  As discussed above    Tobacco abuse  Smoking cessation  be done when patient's mentation is more appropriate    Fibromyalgia  As discussed above    Sarcoidosis of lung  Chest x-ray is clear  Stable, no active issues at this time    HTN (hypertension)  No medications listed  Will continue monitor and add medications as needed  P.r.n. labetalol    Alcohol dependence, continuous  As discussed above    Depression  As discussed above        VTE Risk Mitigation (From admission, onward)         Ordered     Place GISELE hose  Until discontinued      10/26/19 1420     Place sequential compression device  Until discontinued      10/26/19 1420                   Radha Martin MD  Department of Hospital Medicine   [facility]

## 2019-10-26 NOTE — ED NOTES
Hourly rounding completed. Pt lying in ED stretcher, receiving breathing tx at the moment. ED sitterLyn remains at BS in direct visual contact of pt. Bed locked and in lowest position. Side rails up x 2.

## 2019-10-26 NOTE — NURSING
Received original PEC folder and forms from [facility]. Patient resting, NDN. Sitter at bedside. All personal belongings and harmful items removed from room.

## 2019-10-26 NOTE — PROVIDER PROGRESS NOTES - EMERGENCY DEPT.
Encounter Date: 10/25/2019    ED Physician Progress Notes        Physician Note:   I was asked by Dr. Roberts to follow up on the patient to see if she has any improvement of her hypoxia or her DTs after the Valium.  Patient still tachycardic and oxygen saturations dipped in the 88% on room air.  She still has tremulous and I do not feel she is medically stable for an inpatient psychiatric facility.  I spoke with Dr. Garcia with Hospital Medicine will admit for further evaluation management.

## 2019-10-26 NOTE — PLAN OF CARE
Initial Discharge Planning Assessment:  10/26/2019  Patient admitted on 10/25/2019    Chart reviewed, Care plan discussed.  Discussed care plan with treatment team,  attending Dr Radha Martin    PCP updated in Epic: Pt states her PCP is Dr Daylin Mauro at [facility]    Pharmacy, updated in Epic: [address]    DME at home: none  Current dispo :   met with patient at bedside, patient states she lives with her , Henrique Abdul.  Patient was able to verify pt identifiable info on pt facesheet.  Pt states she was independent prior to being inpatient.     Transportation: , Henrique Abdul [phone]    Case management to follow.    Case management to assist with any discharge planning needs.           10/26/19 8856   Discharge Assessment   Assessment Type Discharge Planning Assessment   Confirmed/corrected address and phone number on facesheet? Yes   Assessment information obtained from? Patient   Prior to hospitilization cognitive status: Alert/Oriented   Prior to hospitalization functional status: Independent   Current cognitive status: Alert/Oriented   Current Functional Status: Independent   Lives With spouse   Able to Return to Prior Arrangements yes   Is patient able to care for self after discharge? Unable to determine at this time (comments)   Who are your caregiver(s) and their phone number(s)? Henrique Abdul,   [phone]   Patient's perception of discharge disposition home or selfcare   Readmission Within the Last 30 Days no previous admission in last 30 days   Patient currently being followed by outpatient case management? No   Patient currently receives any other outside agency services? No   Equipment Currently Used at Home none   Do you have any problems affording any of your prescribed medications? No   Does the patient have transportation home? Yes  (pt states her  will transport her home, Henrique Abdul, [phone])   Transportation Anticipated family or friend  will provide   Does the patient receive services at the [facility]? No   Discharge Plan A Home   DME Needed Upon Discharge  none   Patient/Family in Agreement with Plan yes

## 2019-10-26 NOTE — ED NOTES
"Pt requesting medications for "the shakes can I have some medicine". MD Snyder aware of pt's request at this time, Awaiting further orders. Pt aaox4, calm and cooperative with staff currently. Speaking in clear full sentences at this time. Respirations even and unlabored w/ no distress. Pt remains on cardiac monitor, continuous pulse oximetry and automatic blood pressure cuff cycling w/ alarms set. Bed placed in low locked position, side rails up x 2, call light is within reach of patient or family, alarms set and turned on for monitor and pulse ox, will continue to monitor    "

## 2019-10-26 NOTE — ASSESSMENT & PLAN NOTE
Chronic, likely related to marrow suppression secondary to ETOH  No signs of bleeding  Will continue monitor

## 2019-10-26 NOTE — NURSING
RN tried several times to complete admission assessment. Pt would answer a few question and then fall asleep. Will continue to make attempts.

## 2019-10-26 NOTE — ED NOTES
BSSR received from [facility]. Pt sleeping in stretcher, even chest rise and fall noted. Pt remains connected to all cardiac monitoring, pulse ox, BP cycled. Pt unable to provide urine sample at this time. PEC precautions remain in place, pt remains in paper scrubs, all personal belongings given to pt's . ED Lyn treviño, at bedside charting q15min observations, remains in direct line of sight of pt. Awaiting medical clearance from MD for psych placement.

## 2019-10-26 NOTE — ED TRIAGE NOTES
"Pt presents to ED via EMS with c/o ETOH intoxication and taking "a handful of unknown pills". Pt is very tearful and states "I just wants to die, don't save me". Reports she has been battling worsening depression after the recent loss of her son. EMS reports 1 episode of vomiting prior to arrival in ED. Denies chest pain and SOB  "

## 2019-10-26 NOTE — ED NOTES
Floor Charge Nurse, Sabrina Cheek, received Original PEC, signed transfer documentation and pt's rights in yellow PEC  off from ED nurse Aubrie Rivas RN. Verified per ED charge nurse Inna GARCIA RN

## 2019-10-26 NOTE — ED NOTES
bertha Vermater is at bedside keeping 1 to 1 direct observation. Pt is calm and resting with eyes closed

## 2019-10-26 NOTE — ED PROVIDER NOTES
Encounter Date: 10/25/2019    SCRIBE #1 NOTE: I, Hemanth Chowdhury, am scribing for, and in the presence of, Dr. Roberts.       History     Chief Complaint   Patient presents with    Ingestion     Pt took a handfull of pills including trazadone, with alcohol     Time seen by provider: 10:06 PM    This is a 59 y.o. female with a history of  HTN, sarcoidosis, alcohol abuse, and drug abuse who presents due to reported suicide attempt with ingestion of several pills that occurred prior to arrival. The patient reports that she took a handful of pills and she is unsure of what it was.  Per  minute could be combination of Neurontin, Skelaxin, trazodone. The patient states that she is sad because her son  recently. The patient's  reports that their son  in May and she has been struggling with depression for a long time. The patient states that she doesn't want to be here anymore and that a mother should not outlive her child. He reports that she has been struggling with alcohol addiction for a while and went to rehab, but she was only clean for a few weeks. She denies fever, sore throat, chest pain, shortness of breath, nausea, vomiting, and dysuria.    The history is provided by the spouse and the patient.     Review of patient's allergies indicates:   Allergen Reactions    Fentanyl Other (See Comments)     Other reaction(s): Itching    Lortab  [hydrocodone-acetaminophen] Other (See Comments)    Phenothiazines      Past Medical History:   Diagnosis Date    Anemia     Anemia     Depression     Diverticulitis     Fatty liver     GERD (gastroesophageal reflux disease)     Hyperlipidemia     Hypertension     Pancreatitis     Peptic ulcer disease     Polysubstance abuse     Posterior reversible encephalopathy syndrome     Sarcoidosis of lung     Sarcoidosis of lung     over 30 yrs ago    Seizures     2017    Suicide attempt     Suicide ideation      Past Surgical History:    Procedure Laterality Date    APPENDECTOMY      COLONOSCOPY N/A 7/28/2017    Procedure: COLONOSCOPY;  Surgeon: Aaron Alvarado MD;  Location: [facility] ENDO;  Service: Endoscopy;  Laterality: N/A;    ESOPHAGOGASTRODUODENOSCOPY  10/7/2016, 11/6/2014    2016 - gastritis, duodenitis, 2014 erosive gastritis    FLEXIBLE SIGMOIDOSCOPY  11/06/2014    colitis    HYSTERECTOMY      INJECTION OF JOINT Right 10/10/2019    Procedure: Injection, Joint RIGHT ILIOPSOAS BURSA/TENDON INJECTION AND RIGHT GLUTEAL TENDON INJECTION WITH STEROID AND LIDOCAINE;  Surgeon: Guillaume Rico MD;  Location: [facility] PAIN MGT;  Service: Pain Management;  Laterality: Right;  NEEDS CONSENT    mediastenoscopy      TONSILLECTOMY N/A 1970    TUBAL LIGATION       Family History   Problem Relation Age of Onset    Heart attack Father     Diabetes Father     Hypertension Father     Diabetes Mother     Hypertension Mother     Breast cancer Maternal Aunt     Colon cancer Maternal Uncle     Breast cancer Daughter     Ovarian cancer Neg Hx      Social History     Tobacco Use    Smoking status: Current Every Day Smoker     Packs/day: 1.00     Years: 30.00     Pack years: 30.00     Types: Cigarettes    Smokeless tobacco: Never Used   Substance Use Topics    Alcohol use: Yes    Drug use: No     Review of Systems   Constitutional: Negative for fever.   HENT: Negative for congestion.    Eyes: Negative for redness.   Respiratory: Negative for shortness of breath.    Cardiovascular: Negative for chest pain.   Gastrointestinal: Negative for abdominal pain.   Genitourinary: Negative for dysuria.   Skin: Negative for rash.   Neurological: Negative for headaches.   Psychiatric/Behavioral: Positive for self-injury. Negative for confusion.       Physical Exam     Initial Vitals [10/25/19 2135]   BP Pulse Resp Temp SpO2   (!) 154/86 94 18 97.9 °F (36.6 °C) 96 %      MAP       --         Physical Exam    Nursing note and vitals reviewed.  Constitutional: She  appears well-developed and well-nourished.   Intoxicated and tearful appearing.    HENT:   Head: Normocephalic and atraumatic.   Mouth/Throat: Oropharynx is clear and moist.   Eyes: Conjunctivae and EOM are normal. Pupils are equal, round, and reactive to light.   Pupils are 8 mm and reactive.    Neck: Normal range of motion. Neck supple.   Cardiovascular: Normal rate, regular rhythm, normal heart sounds and normal pulses.   No murmur heard.  Pulmonary/Chest: Breath sounds normal. No respiratory distress. She has no wheezes. She has no rhonchi. She has no rales.   Abdominal: Soft. There is no tenderness. There is no rebound and no guarding.   Musculoskeletal: Normal range of motion.   Neurological: She is alert and oriented to person, place, and time. She has normal strength. No cranial nerve deficit.   Skin: Skin is warm and dry.   Psychiatric:   Severely depressed mood         ED Course   Procedures  Labs Reviewed   CBC W/ AUTO DIFFERENTIAL - Abnormal; Notable for the following components:       Result Value    Hemoglobin 11.9 (*)     Mean Corpuscular Hemoglobin Conc 31.1 (*)     RDW 16.8 (*)     Platelets 69 (*)     Gran% 36.5 (*)     Lymph% 54.4 (*)     All other components within normal limits   COMPREHENSIVE METABOLIC PANEL - Abnormal; Notable for the following components:    Potassium 3.3 (*)     Calcium 8.1 (*)     Alkaline Phosphatase 51 (*)     All other components within normal limits   ALCOHOL,MEDICAL (ETHANOL) - Abnormal; Notable for the following components:    Alcohol, Medical, Serum 284 (*)     All other components within normal limits   ACETAMINOPHEN LEVEL - Abnormal; Notable for the following components:    Acetaminophen (Tylenol), Serum <3.0 (*)     All other components within normal limits   ALCOHOL,MEDICAL (ETHANOL) - Abnormal; Notable for the following components:    Alcohol, Medical, Serum 98 (*)     All other components within normal limits   TSH   URINALYSIS, REFLEX TO URINE CULTURE   DRUG  "SCREEN PANEL, URINE EMERGENCY     EKG Readings: (Independently Interpreted)   Initial Reading: No STEMI.   Normal sinus rhythm at a rate of 90 bpm. Normal intervals. No acute ischemia when compare to prior.       Imaging Results          X-Ray Chest AP Portable (Final result)  Result time 10/25/19 22:46:10    Final result by Marti Jimenez MD (10/25/19 22:46:10)                 Impression:      No acute cardiopulmonary process identified.      Electronically signed by: Marti Jimenez MD  Date:    10/25/2019  Time:    22:46             Narrative:    EXAMINATION:  XR CHEST AP PORTABLE    CLINICAL HISTORY:  Cough    TECHNIQUE:  Single frontal view of the chest was performed.    COMPARISON:  July 2018.    FINDINGS:  Cardiac silhouette is normal in size.  Lungs are symmetrically expanded.  No evidence of focal consolidative process, pneumothorax, or significant effusion.  No acute osseous abnormality identified.                                 Medical Decision Making:   Initial Assessment:       59-year-old female with female with history of HTN, sarcoidosis, drug and alcohol abuse brought by EMS due to reported overdose and suicide attempt.  Patient admits to taking a "handful" of pills in attempt to end her life.  She is unsure what pills she may have taken, but she states that was about 15-20.  Her  states he was likely a combination of her leftover pills of Neurontin, Skelaxin, trazodone.  Patient states she has previously had depression but has been severely depressed since the death of her son in May; she was in alcohol rehab recently but relapsed, and admits to heavily drinking today.  She reports no improvement with therapy.  No current physical complaints.  On exam patient is tearful and requesting me to give her medications to end her life.  She has no evidence of trauma or self injury on exam, no other concerning findings.  PEC placed and one-to-one observation started on patient's " arrival.    Screening labs checked with alcohol level 284, with no other acute findings except for mild hypokalemia.  Patient was initially somewhat agitated but calmed down without any further medications, and was observed overnight with no new complaints or issues.  Repeat alcohol was 98, and on reassessment patient with some intention tremors but no tachycardia or other signs of alcohol withdrawal.  Of note, her O2 sat dropped into the low 90s when she was sleeping.  She has minimal expiratory wheezing on exam and admits to active smoking, and also has history of sarcoidosis. Per chart review she has similarly low O2 sats previously on ED visits, so likely chronic issue.  No active SOB, and chest x-ray with no acute findings such as pneumonia or CHF.   Discussed with [place] [facility] Control, who recommends no further observation based on the meds she likely took and almost 12 hr observation in ED.    Patient is medically clear for psychiatric evaluation and admission.    On reassessment, she has mild extremity tremors and reports recently she has been drinking every day for few weeks.  She still has a tachycardia to suggest severe alcohol withdrawal, but will treat empirically with Valium to prevent any worsening.    10am  Patient now is tachycardic in 110s, though she just got the Valium and is somewhat anxious talking to her  about her intentional overdose last night.  Will give IVF and reassess after Valium, but consider medicine admit if she per states persistently tachycardic or if slight hypoxia worsens off O2, has she would not be medically cleared at that point.  Signed out to jasvir BECERRA pending reassessment.    Independently Interpreted Test(s):   I have ordered and independently interpreted X-rays - see prior notes.  I have ordered and independently interpreted EKG Reading(s) - see prior notes  Clinical Tests:   Lab Tests: Ordered and Reviewed  Radiological Study: Ordered and Reviewed  Medical  Tests: Ordered and Reviewed            Scribe Attestation:   Scribe #1: I performed the above scribed service and the documentation accurately describes the services I performed. I attest to the accuracy of the note.    Attending Attestation:           Physician Attestation for Scribe:  Physician Attestation Statement for Scribe #1: I, Dr. Roberts, reviewed documentation, as scribed by Hemanth Chowdhury in my presence, and it is both accurate and complete.                    Clinical Impression:     1. Suicidal ideation    2. Substance abuse    3. Cough    4. Intentional drug overdose, initial encounter                                 Prashant Roberts MD  10/26/19 0820       Prashant Roberts MD  10/26/19 0918       Prashant Roberts MD  10/26/19 0958

## 2019-10-26 NOTE — ED NOTES
Pt resting comfortably in ED stretcher with closed, no shaking noted, eyes closed, RR even and unlabored. Visible rise and fall of chest noted. No acute distress noted. Ed sitterLyn. Remains at in BS in direct visual contact of pt.

## 2019-10-26 NOTE — ED NOTES
Pt awakening to verbal stimuli. Pt updated on POC that she will be transported to floor in stretcher. No tremors noted. Skin warm and dry.  Respirations even and unlabored. No acute distress noted. Pt transported with ED security, RNTiffany, and ED sitter. Lyn CARNES

## 2019-10-26 NOTE — ED NOTES
hudson Verma is at bedside keeping 1 to 1 direct observation. Pt is resting with family at bedside

## 2019-10-26 NOTE — SUBJECTIVE & OBJECTIVE
Past Medical History:   Diagnosis Date    Anemia     Anemia     Depression     Diverticulitis     Fatty liver     GERD (gastroesophageal reflux disease)     Hyperlipidemia     Hypertension     Pancreatitis     Peptic ulcer disease     Polysubstance abuse     Posterior reversible encephalopathy syndrome     Sarcoidosis of lung     Sarcoidosis of lung     over 30 yrs ago    Seizures     7/2017    Suicide attempt     Suicide ideation        Past Surgical History:   Procedure Laterality Date    APPENDECTOMY      COLONOSCOPY N/A 7/28/2017    Procedure: COLONOSCOPY;  Surgeon: Aaron Alvarado MD;  Location: [facility];  Service: Endoscopy;  Laterality: N/A;    ESOPHAGOGASTRODUODENOSCOPY  10/7/2016, 11/6/2014    2016 - gastritis, duodenitis, 2014 erosive gastritis    FLEXIBLE SIGMOIDOSCOPY  11/06/2014    colitis    HYSTERECTOMY      INJECTION OF JOINT Right 10/10/2019    Procedure: Injection, Joint RIGHT ILIOPSOAS BURSA/TENDON INJECTION AND RIGHT GLUTEAL TENDON INJECTION WITH STEROID AND LIDOCAINE;  Surgeon: Guillaume Rico MD;  [facility]: [facility];  Service: Pain Management;  Laterality: Right;  NEEDS CONSENT    mediastenoscopy      TONSILLECTOMY N/A 1970    TUBAL LIGATION         Review of patient's allergies indicates:   Allergen Reactions    Fentanyl Other (See Comments)     Other reaction(s): Itching    Lortab  [hydrocodone-acetaminophen] Other (See Comments)    Phenothiazines        No current facility-administered medications on file prior to encounter.      Current Outpatient Medications on File Prior to Encounter   Medication Sig    ARIPiprazole (ABILIFY) 15 MG disintegrating tablet Take 15 mg by mouth once daily.    doxepin (SINEQUAN) 150 MG Cap Take 25 mg by mouth every evening.    FLUoxetine 10 MG Tab Take 10 mg by mouth once daily.    gabapentin (NEURONTIN) 300 MG capsule take 1 capsule by mouth four times a day    HYDROcodone-acetaminophen (NORCO)  mg per tablet Take 1  tablet by mouth.    levothyroxine sodium (SYNTHROID ORAL) Take by mouth.    metaxalone (SKELAXIN) 800 MG tablet Take 800 mg by mouth 3 (three) times daily.    omeprazole (PRILOSEC) 10 MG capsule Take 10 mg by mouth once daily.    tiZANidine 4 mg Cap Take by mouth.    trazodone HCl (TRAZODONE ORAL) Take by mouth.    B.ANI/L.ACI/L.SAL/L.PLAN/L.GENO (PROBIOTIC FORMULA ORAL) Take 1 tablet by mouth once daily.     dexlansoprazole (DEXILANT) 60 mg capsule Take 60 mg by mouth once daily.    ibuprofen (ADVIL,MOTRIN) 600 MG tablet Take 1 tablet (600 mg total) by mouth every 6 (six) hours as needed for Pain (or fever).    ondansetron (ZOFRAN ODT) 8 MG TbDL Take 8 mg by mouth 3 (three) times daily as needed (for nausea and vomiting).    traMADol (ULTRAM) 50 mg tablet TAKE 1 TABLET PO Q 6 TO 8 H PRN FOR PAIN     Family History     Problem Relation (Age of Onset)    Breast cancer Maternal Aunt, Daughter    Colon cancer Maternal Uncle    Diabetes Father, Mother    Heart attack Father    Hypertension Father, Mother        Tobacco Use    Smoking status: Current Every Day Smoker     Packs/day: 1.00     Years: 30.00     Pack years: 30.00     Types: Cigarettes    Smokeless tobacco: Never Used   Substance and Sexual Activity    Alcohol use: Yes    Drug use: No    Sexual activity: Yes     Birth control/protection: Surgical     Review of Systems   Constitutional: Positive for fever.   HENT: Negative for sore throat.    Eyes: Negative for visual disturbance.   Respiratory: Negative for shortness of breath.    Cardiovascular: Negative for chest pain.   Gastrointestinal: Negative for abdominal pain, nausea and vomiting.   Endocrine: Negative for polyphagia.   Genitourinary: Negative for dysuria.   Musculoskeletal:        Left arm and side pain after fall   Skin: Negative for rash.   Neurological: Positive for tremors. Negative for syncope, speech difficulty and headaches.   Hematological:        Bruise to left arm    Psychiatric/Behavioral: Positive for agitation, self-injury and suicidal ideas. The patient is nervous/anxious.      Objective:     Vital Signs (Most Recent):  Temp: 99 °F (37.2 °C) (10/26/19 1335)  Pulse: 84 (10/26/19 1335)  Resp: 18 (10/26/19 1335)  BP: (!) 146/72 (10/26/19 1335)  SpO2: 97 % (10/26/19 1335) Vital Signs (24h Range):  Temp:  [97.9 °F (36.6 °C)-99 °F (37.2 °C)] 99 °F (37.2 °C)  Pulse:  [] 84  Resp:  [14-18] 18  SpO2:  [88 %-98 %] 97 %  BP: (106-154)/(54-86) 146/72     Weight: 80.7 kg (178 lb)  Body mass index is 28.73 kg/m².    Physical Exam   Constitutional: She is oriented to person, place, and time. She appears well-developed. No distress.   HENT:   Head: Normocephalic and atraumatic.   Eyes: Pupils are equal, round, and reactive to light. Conjunctivae and EOM are normal.   Neck: Normal range of motion.   Cardiovascular: Normal rate and regular rhythm.   Pulmonary/Chest: Effort normal and breath sounds normal.   Abdominal: Soft. Bowel sounds are normal. She exhibits no distension and no mass. There is no tenderness. There is no rebound and no guarding.   Musculoskeletal: Normal range of motion. She exhibits no edema.   Left arm ecchymoses   Neurological: She is alert and oriented to person, place, and time.   Skin: Skin is warm and dry. Capillary refill takes less than 2 seconds. She is not diaphoretic.   Psychiatric: Her mood appears anxious. She expresses impulsivity. She expresses suicidal ideation. She expresses no homicidal ideation. She expresses no homicidal plans.   Nursing note and vitals reviewed.        CRANIAL NERVES     CN III, IV, VI   Pupils are equal, round, and reactive to light.  Extraocular motions are normal.        Significant Labs: All pertinent labs within the past 24 hours have been reviewed.    Significant Imaging: I have reviewed and interpreted all pertinent imaging results/findings within the past 24 hours.

## 2019-10-26 NOTE — PLAN OF CARE
Plan of care reviewed with pt. Pt verbalized understanding. Hourly rounding performed. Sitter at bedside. All safety measures maintained. Bed lowered and locked. Will continue to monitor.

## 2019-10-26 NOTE — ED NOTES
hudson Mccauley is at bedside keeping 1 to 1 direct observation. Pt is calm and resting with eyes closed

## 2019-10-26 NOTE — HPI
59-year-old female with HTN, HLP, sarcoidosis, polysubstance abuse (most notably alcohol and prescribed drugs), depression who presented with suicide attempt with handful of pills.  Patient is of for depression since May of 2019 when she lost her son.  This 16 is the date of his death, and every time around that time a month she reports she has difficulty with having suicidal ideation.  She stated that she wanted the pain to to stop, and for all of his to and and as why she took those pills.  It is reportedly handful of Neurontin, Skelaxin and trazodone.  She has had bouts of alcohol withdrawal in the past and has recently undergone rehab in September of this year.  She was successful with being sober for 2 months before she relapsed.  Last alcoholic beverage was last night, approximately 8:00 p.m. she drank approximately half a bottle of vodka.  In the ER, workup with blood alcohol level 284, negative acetaminophen, U tox negative, UA and chest x-ray are unremarkable, CMP with potassium of 3.3 otherwise normal, and platelets of 69.  She was placed under PEC.  While in the ER, she started have tremors.

## 2019-10-27 LAB
ALBUMIN SERPL BCP-MCNC: 3.3 G/DL (ref 3.5–5.2)
ALP SERPL-CCNC: 47 U/L (ref 55–135)
ALT SERPL W/O P-5'-P-CCNC: 14 U/L (ref 10–44)
ANION GAP SERPL CALC-SCNC: 8 MMOL/L (ref 8–16)
AST SERPL-CCNC: 23 U/L (ref 10–40)
BASOPHILS # BLD AUTO: 0.02 K/UL (ref 0–0.2)
BASOPHILS NFR BLD: 0.4 % (ref 0–1.9)
BILIRUB SERPL-MCNC: 0.4 MG/DL (ref 0.1–1)
BUN SERPL-MCNC: 8 MG/DL (ref 8–23)
CALCIUM SERPL-MCNC: 8.1 MG/DL (ref 8.7–10.5)
CHLORIDE SERPL-SCNC: 109 MMOL/L (ref 95–110)
CO2 SERPL-SCNC: 25 MMOL/L (ref 23–29)
CREAT SERPL-MCNC: 0.7 MG/DL (ref 0.5–1.4)
DIFFERENTIAL METHOD: ABNORMAL
EOSINOPHIL # BLD AUTO: 0.1 K/UL (ref 0–0.5)
EOSINOPHIL NFR BLD: 1 % (ref 0–8)
ERYTHROCYTE [DISTWIDTH] IN BLOOD BY AUTOMATED COUNT: 17.2 % (ref 11.5–14.5)
EST. GFR  (AFRICAN AMERICAN): >60 ML/MIN/1.73 M^2
EST. GFR  (NON AFRICAN AMERICAN): >60 ML/MIN/1.73 M^2
GLUCOSE SERPL-MCNC: 100 MG/DL (ref 70–110)
HCT VFR BLD AUTO: 38.3 % (ref 37–48.5)
HGB BLD-MCNC: 11.7 G/DL (ref 12–16)
IMM GRANULOCYTES # BLD AUTO: 0.01 K/UL (ref 0–0.04)
IMM GRANULOCYTES NFR BLD AUTO: 0.2 % (ref 0–0.5)
INR PPP: 0.9 (ref 0.8–1.2)
LYMPHOCYTES # BLD AUTO: 2.3 K/UL (ref 1–4.8)
LYMPHOCYTES NFR BLD: 45.9 % (ref 18–48)
MAGNESIUM SERPL-MCNC: 1.7 MG/DL (ref 1.6–2.6)
MCH RBC QN AUTO: 27.3 PG (ref 27–31)
MCHC RBC AUTO-ENTMCNC: 30.5 G/DL (ref 32–36)
MCV RBC AUTO: 89 FL (ref 82–98)
MONOCYTES # BLD AUTO: 0.5 K/UL (ref 0.3–1)
MONOCYTES NFR BLD: 9.6 % (ref 4–15)
NEUTROPHILS # BLD AUTO: 2.2 K/UL (ref 1.8–7.7)
NEUTROPHILS NFR BLD: 42.9 % (ref 38–73)
NRBC BLD-RTO: 0 /100 WBC
PHOSPHATE SERPL-MCNC: 2.7 MG/DL (ref 2.7–4.5)
PLATELET # BLD AUTO: 77 K/UL (ref 150–350)
PMV BLD AUTO: 10.6 FL (ref 9.2–12.9)
POTASSIUM SERPL-SCNC: 3.8 MMOL/L (ref 3.5–5.1)
PROT SERPL-MCNC: 6 G/DL (ref 6–8.4)
PROTHROMBIN TIME: 10.5 SEC (ref 9–12.5)
RBC # BLD AUTO: 4.29 M/UL (ref 4–5.4)
SODIUM SERPL-SCNC: 142 MMOL/L (ref 136–145)
WBC # BLD AUTO: 5.1 K/UL (ref 3.9–12.7)

## 2019-10-27 PROCEDURE — 25000003 PHARM REV CODE 250: Performed by: HOSPITALIST

## 2019-10-27 PROCEDURE — 90472 IMMUNIZATION ADMIN EACH ADD: CPT | Performed by: HOSPITALIST

## 2019-10-27 PROCEDURE — 80053 COMPREHEN METABOLIC PANEL: CPT

## 2019-10-27 PROCEDURE — 36415 COLL VENOUS BLD VENIPUNCTURE: CPT

## 2019-10-27 PROCEDURE — 85025 COMPLETE CBC W/AUTO DIFF WBC: CPT

## 2019-10-27 PROCEDURE — 99232 PR SUBSEQUENT HOSPITAL CARE,LEVL II: ICD-10-PCS | Mod: ,,, | Performed by: HOSPITALIST

## 2019-10-27 PROCEDURE — 85610 PROTHROMBIN TIME: CPT

## 2019-10-27 PROCEDURE — 90686 IIV4 VACC NO PRSV 0.5 ML IM: CPT | Performed by: HOSPITALIST

## 2019-10-27 PROCEDURE — 94761 N-INVAS EAR/PLS OXIMETRY MLT: CPT

## 2019-10-27 PROCEDURE — 83735 ASSAY OF MAGNESIUM: CPT

## 2019-10-27 PROCEDURE — 11000001 HC ACUTE MED/SURG PRIVATE ROOM

## 2019-10-27 PROCEDURE — 90471 IMMUNIZATION ADMIN: CPT | Performed by: HOSPITALIST

## 2019-10-27 PROCEDURE — 99232 SBSQ HOSP IP/OBS MODERATE 35: CPT | Mod: ,,, | Performed by: HOSPITALIST

## 2019-10-27 PROCEDURE — 63600175 PHARM REV CODE 636 W HCPCS: Performed by: HOSPITALIST

## 2019-10-27 PROCEDURE — 90670 PCV13 VACCINE IM: CPT | Performed by: HOSPITALIST

## 2019-10-27 PROCEDURE — 84100 ASSAY OF PHOSPHORUS: CPT

## 2019-10-27 RX ORDER — LOPERAMIDE HYDROCHLORIDE 2 MG/1
2 CAPSULE ORAL ONCE
Status: COMPLETED | OUTPATIENT
Start: 2019-10-27 | End: 2019-10-27

## 2019-10-27 RX ORDER — FLUOXETINE 10 MG/1
10 CAPSULE ORAL NIGHTLY
Status: DISCONTINUED | OUTPATIENT
Start: 2019-10-27 | End: 2019-10-28 | Stop reason: HOSPADM

## 2019-10-27 RX ADMIN — LEVOTHYROXINE SODIUM 50 MCG: 50 TABLET ORAL at 05:10

## 2019-10-27 RX ADMIN — FLUOXETINE 10 MG: 10 CAPSULE ORAL at 09:10

## 2019-10-27 RX ADMIN — DIAZEPAM 10 MG: 5 TABLET ORAL at 09:10

## 2019-10-27 RX ADMIN — LORAZEPAM 2 MG: 2 INJECTION INTRAMUSCULAR; INTRAVENOUS at 10:10

## 2019-10-27 RX ADMIN — LOPERAMIDE HYDROCHLORIDE 2 MG: 2 CAPSULE ORAL at 06:10

## 2019-10-27 RX ADMIN — BENZONATATE 100 MG: 100 CAPSULE ORAL at 06:10

## 2019-10-27 RX ADMIN — ACETAMINOPHEN 650 MG: 325 TABLET, FILM COATED ORAL at 06:10

## 2019-10-27 RX ADMIN — DIAZEPAM 10 MG: 5 TABLET ORAL at 03:10

## 2019-10-27 RX ADMIN — DIAZEPAM 10 MG: 5 TABLET ORAL at 05:10

## 2019-10-27 RX ADMIN — LORAZEPAM 2 MG: 2 INJECTION INTRAMUSCULAR; INTRAVENOUS at 06:10

## 2019-10-27 RX ADMIN — FOLIC ACID: 5 INJECTION, SOLUTION INTRAMUSCULAR; INTRAVENOUS; SUBCUTANEOUS at 10:10

## 2019-10-27 RX ADMIN — INFLUENZA VIRUS VACCINE 0.5 ML: 15; 15; 15; 15 SUSPENSION INTRAMUSCULAR at 03:10

## 2019-10-27 RX ADMIN — PNEUMOCOCCAL 13-VALENT CONJUGATE VACCINE 0.5 ML: 2.2; 2.2; 2.2; 2.2; 2.2; 4.4; 2.2; 2.2; 2.2; 2.2; 2.2; 2.2; 2.2 INJECTION, SUSPENSION INTRAMUSCULAR at 03:10

## 2019-10-27 NOTE — HOSPITAL COURSE
Ms. Abdul presented with suicidal ideation with overt attempt at drug overdose and with ETOH withdrawal. PEC'd and with 1:1 sitter. Scheduled valium taper and ativan PRN for ETOH withdrawal. LFTS were normal and platelets improved. Pt was doing well on valium taper and symptoms controlled on PO regimen. Supplemented with thiamine and folic acid. Telepsych consulted and they were in agreement with continuing PEC. She was later CEC'd. Her vitals were stable and she was well controlled on Valium taper. She was medically cleared for transfer to inpatient psychiatric facility on 10/28/2019 and  arranged for transfer to inpatient psychiatric facility for continued care.

## 2019-10-27 NOTE — PLAN OF CARE
Patient with a very flat affect.  No response to plan of care review.   sitter at the bedside entire shift. PEC noted.  Rounding maintained.  A quiet night for patient.  She slept most of it.

## 2019-10-27 NOTE — PROGRESS NOTES
[facility] Hospital Medicine  Progress Note    Patient Name: Earl Abdul  MRN: [id number]  Patient Class: IP- Inpatient   Admission Date: 10/25/2019  Length of Stay: 1 days  Attending Physician: Radha Martin MD  Primary Care Provider: Izzy Garcia MD        Subjective:     Principal Problem:Suicidal ideation        HPI:  59-year-old female with HTN, HLP, sarcoidosis, polysubstance abuse (most notably alcohol and prescribed drugs), depression who presented with suicide attempt with handful of pills.  Patient is of for depression since May of 2019 when she lost her son.  This 16 is the date of his death, and every time around that time a month she reports she has difficulty with having suicidal ideation.  She stated that she wanted the pain to to stop, and for all of his to and and as why she took those pills.  It is reportedly handful of Neurontin, Skelaxin and trazodone.  She has had bouts of alcohol withdrawal in the past and has recently undergone rehab in September of this year.  She was successful with being sober for 2 months before she relapsed.  Last alcoholic beverage was last night, approximately 8:00 p.m. she drank approximately half a bottle of vodka.  In the ER, workup with blood alcohol level 284, negative acetaminophen, U tox negative, UA and chest x-ray are unremarkable, CMP with potassium of 3.3 otherwise normal, and platelets of 69.  She was placed under PEC.  While in the ER, she started have tremors.    Overview/Hospital Course:  Ms. Abdul presented with suicidal ideation with overt attempt at drug overdose and with ETOH withdrawal. PEC'd and with 1:1 sitter. Scheduled valium taper and ativan PRN for ETOH withdrawal.     Interval History: Pt with no acute issues overnight, tremors better. Still with SI and  at bedside.     Review of Systems   Constitutional: Negative for chills and fever.   Respiratory: Negative for shortness of breath.    Cardiovascular:  Negative for chest pain.   Psychiatric/Behavioral: Positive for suicidal ideas.     Objective:     Vital Signs (Most Recent):  Temp: 98.2 °F (36.8 °C) (10/27/19 1616)  Pulse: 73 (10/27/19 1616)  Resp: 18 (10/27/19 1616)  BP: (!) 166/81 (10/27/19 1616)  SpO2: 95 % (10/27/19 1616) Vital Signs (24h Range):  Temp:  [98 °F (36.7 °C)-99.5 °F (37.5 °C)] 98.2 °F (36.8 °C)  Pulse:  [64-98] 73  Resp:  [14-20] 18  SpO2:  [93 %-96 %] 95 %  BP: (163-179)/(71-85) 166/81     Weight: 80.7 kg (178 lb)  Body mass index is 28.73 kg/m².    Intake/Output Summary (Last 24 hours) at 10/27/2019 1755  Last data filed at 10/27/2019 1011  Gross per 24 hour   Intake 1000 ml   Output 1200 ml   Net -200 ml      Physical Exam   Constitutional: She is oriented to person, place, and time. She appears well-developed. No distress.   HENT:   Head: Normocephalic and atraumatic.   Eyes: Pupils are equal, round, and reactive to light. Conjunctivae and EOM are normal.   Neck: Normal range of motion.   Cardiovascular: Normal rate and regular rhythm.   Pulmonary/Chest: Effort normal and breath sounds normal.   Abdominal: Soft. Bowel sounds are normal. She exhibits no distension and no mass. There is no tenderness. There is no rebound and no guarding.   Musculoskeletal: Normal range of motion. She exhibits no edema.   Left arm ecchymoses   Neurological: She is alert and oriented to person, place, and time.   Skin: Skin is warm and dry. Capillary refill takes less than 2 seconds. She is not diaphoretic.   Psychiatric: Her mood appears anxious. She expresses impulsivity. She expresses suicidal ideation. She expresses no homicidal ideation. She expresses no homicidal plans.   Nursing note and vitals reviewed.      Significant Labs: All pertinent labs within the past 24 hours have been reviewed.    Significant Imaging: I have reviewed and interpreted all pertinent imaging results/findings within the past 24 hours.      Assessment/Plan:      * Suicidal  ideation  Patient with longstanding depression since May of 2019 due to loss of her son  Patient has been self medicating with alcohol, with to suicide ideation for some  However, this her 1st attempt with overdose ingestion of pills  Unknown quantity medications consumed  Held all of her psychiatric medications  She has been PEC'd and requires one-to-one observation  Will re-start prozac today  When medically cleared, she will need inpatient psychiatric treatment    Alcohol withdrawal  Last alcoholic beverage was last night with previous history of withdrawals in the past  Patient start tap tremors that started in the ER this morning  Continue Valium p.o. q.d. 8 hr and taper according to symptoms, and have Ativan p.r.n.  Supplement with thiamine and folic acid  Counseled on cessation   for community resources  Symptomatically improving    Thrombocytopenia  Chronic, likely related to marrow suppression secondary to ETOH  No signs of bleeding  Will continue monitor    Depression with anxiety  As discussed above    Tobacco abuse  Smoking cessation  be done when patient's mentation is more appropriate    Fibromyalgia  As discussed above    Sarcoidosis of lung  Chest x-ray is clear  Stable, no active issues at this time    HTN (hypertension)  No medications listed  Will continue monitor and add medications as needed  P.r.n. labetalol    Alcohol dependence, continuous  As discussed above    Depression  As discussed above        VTE Risk Mitigation (From admission, onward)         Ordered     IP VTE HIGH RISK PATIENT  Once      10/26/19 2054     Place GISELE hose  Until discontinued      10/26/19 2054     Place GISELE hose  Until discontinued      10/26/19 1420     Place sequential compression device  Until discontinued      10/26/19 1420                      Radha Martin MD  Department of Hospital Medicine   [facility]

## 2019-10-27 NOTE — ASSESSMENT & PLAN NOTE
Last alcoholic beverage was last night with previous history of withdrawals in the past  Patient start tap tremors that started in the ER this morning  Continue Valium p.o. q.d. 8 hr and taper according to symptoms, and have Ativan p.r.n.  Supplement with thiamine and folic acid  Counseled on cessation   for community resources  Symptomatically improving

## 2019-10-27 NOTE — ASSESSMENT & PLAN NOTE
Patient with longstanding depression since May of 2019 due to loss of her son  Patient has been self medicating with alcohol, with to suicide ideation for some  However, this her 1st attempt with overdose ingestion of pills  Unknown quantity medications consumed  Held all of her psychiatric medications  She has been PEC'd and requires one-to-one observation  Will re-start prozac today  When medically cleared, she will need inpatient psychiatric treatment

## 2019-10-27 NOTE — SUBJECTIVE & OBJECTIVE
Interval History: Pt with no acute issues overnight, tremors better. Still with SI and  at bedside.     Review of Systems   Constitutional: Negative for chills and fever.   Respiratory: Negative for shortness of breath.    Cardiovascular: Negative for chest pain.   Psychiatric/Behavioral: Positive for suicidal ideas.     Objective:     Vital Signs (Most Recent):  Temp: 98.2 °F (36.8 °C) (10/27/19 1616)  Pulse: 73 (10/27/19 1616)  Resp: 18 (10/27/19 1616)  BP: (!) 166/81 (10/27/19 1616)  SpO2: 95 % (10/27/19 1616) Vital Signs (24h Range):  Temp:  [98 °F (36.7 °C)-99.5 °F (37.5 °C)] 98.2 °F (36.8 °C)  Pulse:  [64-98] 73  Resp:  [14-20] 18  SpO2:  [93 %-96 %] 95 %  BP: (163-179)/(71-85) 166/81     Weight: 80.7 kg (178 lb)  Body mass index is 28.73 kg/m².    Intake/Output Summary (Last 24 hours) at 10/27/2019 1755  Last data filed at 10/27/2019 1011  Gross per 24 hour   Intake 1000 ml   Output 1200 ml   Net -200 ml      Physical Exam   Constitutional: She is oriented to person, place, and time. She appears well-developed. No distress.   HENT:   Head: Normocephalic and atraumatic.   Eyes: Pupils are equal, round, and reactive to light. Conjunctivae and EOM are normal.   Neck: Normal range of motion.   Cardiovascular: Normal rate and regular rhythm.   Pulmonary/Chest: Effort normal and breath sounds normal.   Abdominal: Soft. Bowel sounds are normal. She exhibits no distension and no mass. There is no tenderness. There is no rebound and no guarding.   Musculoskeletal: Normal range of motion. She exhibits no edema.   Left arm ecchymoses   Neurological: She is alert and oriented to person, place, and time.   Skin: Skin is warm and dry. Capillary refill takes less than 2 seconds. She is not diaphoretic.   Psychiatric: Her mood appears anxious. She expresses impulsivity. She expresses suicidal ideation. She expresses no homicidal ideation. She expresses no homicidal plans.   Nursing note and vitals  reviewed.      Significant Labs: All pertinent labs within the past 24 hours have been reviewed.    Significant Imaging: I have reviewed and interpreted all pertinent imaging results/findings within the past 24 hours.

## 2019-10-28 VITALS
BODY MASS INDEX: 28.6 KG/M2 | RESPIRATION RATE: 18 BRPM | SYSTOLIC BLOOD PRESSURE: 157 MMHG | OXYGEN SATURATION: 94 % | HEART RATE: 70 BPM | HEIGHT: 66 IN | WEIGHT: 177.94 LBS | DIASTOLIC BLOOD PRESSURE: 69 MMHG | TEMPERATURE: 98 F

## 2019-10-28 LAB
ALBUMIN SERPL BCP-MCNC: 4 G/DL (ref 3.5–5.2)
ALP SERPL-CCNC: 56 U/L (ref 55–135)
ALT SERPL W/O P-5'-P-CCNC: 15 U/L (ref 10–44)
ANION GAP SERPL CALC-SCNC: 11 MMOL/L (ref 8–16)
AST SERPL-CCNC: 24 U/L (ref 10–40)
BASOPHILS # BLD AUTO: 0.02 K/UL (ref 0–0.2)
BASOPHILS NFR BLD: 0.4 % (ref 0–1.9)
BILIRUB SERPL-MCNC: 0.6 MG/DL (ref 0.1–1)
BUN SERPL-MCNC: 8 MG/DL (ref 8–23)
CALCIUM SERPL-MCNC: 9.4 MG/DL (ref 8.7–10.5)
CHLORIDE SERPL-SCNC: 103 MMOL/L (ref 95–110)
CO2 SERPL-SCNC: 27 MMOL/L (ref 23–29)
CREAT SERPL-MCNC: 0.8 MG/DL (ref 0.5–1.4)
DIFFERENTIAL METHOD: ABNORMAL
EOSINOPHIL # BLD AUTO: 0 K/UL (ref 0–0.5)
EOSINOPHIL NFR BLD: 0.8 % (ref 0–8)
ERYTHROCYTE [DISTWIDTH] IN BLOOD BY AUTOMATED COUNT: 16.8 % (ref 11.5–14.5)
EST. GFR  (AFRICAN AMERICAN): >60 ML/MIN/1.73 M^2
EST. GFR  (NON AFRICAN AMERICAN): >60 ML/MIN/1.73 M^2
GLUCOSE SERPL-MCNC: 97 MG/DL (ref 70–110)
HCT VFR BLD AUTO: 43.1 % (ref 37–48.5)
HGB BLD-MCNC: 13.3 G/DL (ref 12–16)
IMM GRANULOCYTES # BLD AUTO: 0.02 K/UL (ref 0–0.04)
IMM GRANULOCYTES NFR BLD AUTO: 0.4 % (ref 0–0.5)
LYMPHOCYTES # BLD AUTO: 1.6 K/UL (ref 1–4.8)
LYMPHOCYTES NFR BLD: 33.1 % (ref 18–48)
MAGNESIUM SERPL-MCNC: 1.7 MG/DL (ref 1.6–2.6)
MCH RBC QN AUTO: 27.3 PG (ref 27–31)
MCHC RBC AUTO-ENTMCNC: 30.9 G/DL (ref 32–36)
MCV RBC AUTO: 89 FL (ref 82–98)
MONOCYTES # BLD AUTO: 0.4 K/UL (ref 0.3–1)
MONOCYTES NFR BLD: 8.9 % (ref 4–15)
NEUTROPHILS # BLD AUTO: 2.7 K/UL (ref 1.8–7.7)
NEUTROPHILS NFR BLD: 56.4 % (ref 38–73)
NRBC BLD-RTO: 0 /100 WBC
PHOSPHATE SERPL-MCNC: 3.8 MG/DL (ref 2.7–4.5)
PLATELET # BLD AUTO: 81 K/UL (ref 150–350)
PMV BLD AUTO: 10.4 FL (ref 9.2–12.9)
POTASSIUM SERPL-SCNC: 4 MMOL/L (ref 3.5–5.1)
PROT SERPL-MCNC: 7.2 G/DL (ref 6–8.4)
RBC # BLD AUTO: 4.87 M/UL (ref 4–5.4)
SODIUM SERPL-SCNC: 141 MMOL/L (ref 136–145)
WBC # BLD AUTO: 4.83 K/UL (ref 3.9–12.7)

## 2019-10-28 PROCEDURE — 83735 ASSAY OF MAGNESIUM: CPT

## 2019-10-28 PROCEDURE — 63600175 PHARM REV CODE 636 W HCPCS: Performed by: HOSPITALIST

## 2019-10-28 PROCEDURE — G0425 PR INPT TELEHEALTH CONSULT 30M: ICD-10-PCS | Mod: GT,,, | Performed by: PSYCHIATRY & NEUROLOGY

## 2019-10-28 PROCEDURE — 84100 ASSAY OF PHOSPHORUS: CPT

## 2019-10-28 PROCEDURE — 36415 COLL VENOUS BLD VENIPUNCTURE: CPT

## 2019-10-28 PROCEDURE — 85025 COMPLETE CBC W/AUTO DIFF WBC: CPT

## 2019-10-28 PROCEDURE — 25000003 PHARM REV CODE 250: Performed by: HOSPITALIST

## 2019-10-28 PROCEDURE — 80053 COMPREHEN METABOLIC PANEL: CPT

## 2019-10-28 PROCEDURE — G0425 INPT/ED TELECONSULT30: HCPCS | Mod: GT,,, | Performed by: PSYCHIATRY & NEUROLOGY

## 2019-10-28 PROCEDURE — 99239 PR HOSPITAL DISCHARGE DAY,>30 MIN: ICD-10-PCS | Mod: ,,, | Performed by: HOSPITALIST

## 2019-10-28 PROCEDURE — 99239 HOSP IP/OBS DSCHRG MGMT >30: CPT | Mod: ,,, | Performed by: HOSPITALIST

## 2019-10-28 RX ORDER — MIRTAZAPINE 7.5 MG/1
7.5 TABLET, FILM COATED ORAL NIGHTLY PRN
Status: ON HOLD
Start: 2019-10-28 | End: 2020-02-13 | Stop reason: HOSPADM

## 2019-10-28 RX ORDER — ACETAMINOPHEN 325 MG/1
650 TABLET ORAL EVERY 6 HOURS PRN
Refills: 0 | COMMUNITY
Start: 2019-10-28 | End: 2020-11-14

## 2019-10-28 RX ORDER — LORAZEPAM 1 MG/1
1 TABLET ORAL EVERY 6 HOURS PRN
Status: ON HOLD
Start: 2019-10-28 | End: 2020-02-13 | Stop reason: HOSPADM

## 2019-10-28 RX ORDER — LEVOTHYROXINE SODIUM 25 UG/1
25 TABLET ORAL
Qty: 30 TABLET | Refills: 0 | Status: ON HOLD
Start: 2019-10-29 | End: 2021-04-23 | Stop reason: HOSPADM

## 2019-10-28 RX ORDER — LOPERAMIDE HYDROCHLORIDE 2 MG/1
2 CAPSULE ORAL DAILY PRN
Refills: 0
Start: 2019-10-28 | End: 2019-11-07

## 2019-10-28 RX ORDER — DIAZEPAM 5 MG/1
TABLET ORAL
Qty: 180 TABLET | Refills: 0 | Status: ON HOLD
Start: 2019-10-28 | End: 2020-02-13 | Stop reason: HOSPADM

## 2019-10-28 RX ORDER — FLUOXETINE 10 MG/1
10 CAPSULE ORAL NIGHTLY
Qty: 30 CAPSULE | Refills: 11
Start: 2019-10-28 | End: 2020-06-03

## 2019-10-28 RX ORDER — LOPERAMIDE HYDROCHLORIDE 2 MG/1
2 CAPSULE ORAL DAILY PRN
Status: DISCONTINUED | OUTPATIENT
Start: 2019-10-28 | End: 2019-10-28 | Stop reason: HOSPADM

## 2019-10-28 RX ORDER — LORAZEPAM 1 MG/1
1 TABLET ORAL EVERY 6 HOURS PRN
Status: DISCONTINUED | OUTPATIENT
Start: 2019-10-28 | End: 2019-10-28 | Stop reason: HOSPADM

## 2019-10-28 RX ADMIN — LEVOTHYROXINE SODIUM 50 MCG: 50 TABLET ORAL at 05:10

## 2019-10-28 RX ADMIN — DIAZEPAM 10 MG: 5 TABLET ORAL at 05:10

## 2019-10-28 RX ADMIN — ACETAMINOPHEN 650 MG: 325 TABLET, FILM COATED ORAL at 01:10

## 2019-10-28 RX ADMIN — ONDANSETRON 4 MG: 2 INJECTION INTRAMUSCULAR; INTRAVENOUS at 05:10

## 2019-10-28 RX ADMIN — Medication 1 TABLET: at 09:10

## 2019-10-28 RX ADMIN — LORAZEPAM 1 MG: 1 TABLET ORAL at 01:10

## 2019-10-28 RX ADMIN — MIRTAZAPINE 7.5 MG: 7.5 TABLET ORAL at 12:10

## 2019-10-28 RX ADMIN — ACETAMINOPHEN 650 MG: 325 TABLET, FILM COATED ORAL at 05:10

## 2019-10-28 RX ADMIN — DIAZEPAM 10 MG: 5 TABLET ORAL at 01:10

## 2019-10-28 NOTE — ASSESSMENT & PLAN NOTE
Chronic, likely related to marrow suppression secondary to ETOH  No signs of bleeding, and platelets improving daily  No need for further monitoring

## 2019-10-28 NOTE — SUBJECTIVE & OBJECTIVE
Interval History: ***    Review of Systems  Objective:     Vital Signs (Most Recent):  Temp: 98.7 °F (37.1 °C) (10/28/19 1238)  Pulse: 74 (10/28/19 1238)  Resp: 16 (10/28/19 1238)  BP: (!) 172/77 (10/28/19 1238)  SpO2: (!) 89 % (10/28/19 1238) Vital Signs (24h Range):  Temp:  [98.2 °F (36.8 °C)-98.7 °F (37.1 °C)] 98.7 °F (37.1 °C)  Pulse:  [] 74  Resp:  [16-20] 16  SpO2:  [89 %-95 %] 89 %  BP: (118-174)/(57-84) 172/77     Weight: 80.7 kg (177 lb 14.6 oz)  Body mass index is 28.72 kg/m².    Intake/Output Summary (Last 24 hours) at 10/28/2019 1337  Last data filed at 10/28/2019 0400  Gross per 24 hour   Intake 600 ml   Output 1800 ml   Net -1200 ml      Physical Exam    Significant Labs: {Results:83689}    Significant Imaging: {Imaging Review:84530}

## 2019-10-28 NOTE — PLAN OF CARE
Patient stable. VSS. Sitter at bedside 24/7. Hourly rounding completed. Pain managed with PRN medications. Denies needs. Call bell in reach. Side rails up X2. Bed locked, lowered. Safety maintained.

## 2019-10-28 NOTE — NURSING
Phoned coroners office received info about need to email office to inform them of pec'd patient. Emailed office of patient pec'd status and expiration date

## 2019-10-28 NOTE — PLAN OF CARE
Charge nurse confirmed  notified of PEC.  New system of emailing updates sent by [facility] charge nurse today.        PEC expires at 10:40 pm on 10/28/2019.      Spoke with Lorin at [facility] [phone].  They have availability at [facility] ([address] [place]  [address]  [phone]).  Receiving physician Dr. Garcia.    Their  has already left for the day, CEC will have to be completed before pt can transfer there.      Spoke with Evens at [facility] [phone].  They have a bed available in [place], were checking into availability in [facility]. - no beds available.      SW to update pt and spouse. Anticipate transfer to [facility] this evening after CEC obtained.

## 2019-10-28 NOTE — NURSING
PEC rounding completed. Plan of care reviewed with pt, medications reviewed. Vital signs stable. Pt c/o intermittent lower back pain. Rate as pain as a 6 on numerical pain scale. Sitter at BS with direct contact of pt. Pt ambulated to bathroom independently, reports generalized weakness with ambulation. Purposeful rounding completed. Bed wheels locked and in lowest position. Side rails up x 2. Pt AAO x 4 and appropriate at this time. No acute distress noted. Will continue to monitor. Ongoing interventions implemented.

## 2019-10-28 NOTE — PLAN OF CARE
[facility]    FACILITY TRANSFER ORDERS      Patient Name: Earl Abdul  YOB: 1960    PCP: Izzy Garcia MD   PCP Address: [address] [place] [address]  PCP Phone Number: [phone]  PCP Fax: [phone]    Encounter Date: 10/28/2019    Admit to: Inpatient psychiatric facility    Vital Signs:  Routine    Diagnoses:   Active Hospital Problems    Diagnosis  POA    *Suicidal ideation [R45.851]  Not Applicable     Priority: 1 - High    Alcohol withdrawal [F10.239]  Yes     Priority: 2     Thrombocytopenia [D69.6]  Yes    Depression with anxiety [F41.8]  Yes    Depression [F32.9]  Yes    Alcohol dependence, continuous [F10.20]  Yes    HTN (hypertension) [I10]  Yes    Sarcoidosis of lung [D86.0]  Yes    Tobacco abuse [Z72.0]  Yes    Fibromyalgia [M79.7]  Yes      Resolved Hospital Problems   No resolved problems to display.       Allergies:  Review of patient's allergies indicates:   Allergen Reactions    Fentanyl Other (See Comments)     Other reaction(s): Itching    Lortab  [hydrocodone-acetaminophen] Other (See Comments)    Phenothiazines        Diet: regular    Activities: Activity as tolerated    Nursing: As per facility protocol     Labs:  As per facility protocol        Medications: Review discharge medications with patient and family and provide education.      Current Discharge Medication List      START taking these medications    Details   acetaminophen (TYLENOL) 325 MG tablet Take 2 tablets (650 mg total) by mouth every 6 (six) hours as needed.  Refills: 0      diazePAM (VALIUM) 5 MG tablet Take 2 tablets every 8 hours x 2 days, then take 1 tablet every 8 hours x 2 days, then take 1 tablet every 12 hours x 2 days, then take 1 table daily x 2 days, then none.  Refills: 0      FLUoxetine 10 MG capsule Take 1 capsule (10 mg total) by mouth every evening.  Qty: 30 capsule, Refills: 11      folic acid-vit B6-vit B12 2.5-25-2 mg (FOLBIC OR EQUIV) 2.5-25-2  mg Tab Take 1 tablet by mouth once daily.      loperamide (IMODIUM) 2 mg capsule Take 1 capsule (2 mg total) by mouth daily as needed for Diarrhea.  Refills: 0      LORazepam (ATIVAN) 1 MG tablet Take 1 tablet (1 mg total) by mouth every 6 (six) hours as needed.      mirtazapine (REMERON) 7.5 MG Tab Take 1 tablet (7.5 mg total) by mouth nightly as needed (insomnia).         CONTINUE these medications which have CHANGED    Details   levothyroxine (SYNTHROID) 25 MCG tablet Take 1 tablet (25 mcg total) by mouth before breakfast.  Qty: 30 tablet, Refills: 0         CONTINUE these medications which have NOT CHANGED    Details   omeprazole (PRILOSEC) 10 MG capsule Take 10 mg by mouth once daily.      ondansetron (ZOFRAN ODT) 8 MG TbDL Take 8 mg by mouth 3 (three) times daily as needed (for nausea and vomiting).         STOP taking these medications       ARIPiprazole (ABILIFY) 15 MG disintegrating tablet Comments:   Reason for Stopping:         doxepin (SINEQUAN) 150 MG Cap Comments:   Reason for Stopping:         FLUoxetine 10 MG Tab Comments:   Reason for Stopping:         gabapentin (NEURONTIN) 300 MG capsule Comments:   Reason for Stopping:         HYDROcodone-acetaminophen (NORCO)  mg per tablet Comments:   Reason for Stopping:         metaxalone (SKELAXIN) 800 MG tablet Comments:   Reason for Stopping:         tiZANidine 4 mg Cap Comments:   Reason for Stopping:         trazodone HCl (TRAZODONE ORAL) Comments:   Reason for Stopping:         B.ANI/L.ACI/L.SAL/L.PLAN/L.GENO (PROBIOTIC FORMULA ORAL) Comments:   Reason for Stopping:         dexlansoprazole (DEXILANT) 60 mg capsule Comments:   Reason for Stopping:         ibuprofen (ADVIL,MOTRIN) 600 MG tablet Comments:   Reason for Stopping:         traMADol (ULTRAM) 50 mg tablet Comments:   Reason for Stopping:                    _________________________________  Radha Martin MD  10/28/2019

## 2019-10-28 NOTE — ASSESSMENT & PLAN NOTE
Last alcoholic beverage was the night before admission with previous history of withdrawals in the past  Patient started to have tremors that started in the ER   Doing well on Valium p.o. q.d. 8 hr and taper according to symptoms, and have Ativan PO  p.r.n.  Supplement with thiamine and folic acid  Counseled on cessation   for community resources  Medically stable on PO taper regimen

## 2019-10-28 NOTE — ASSESSMENT & PLAN NOTE
Patient with longstanding depression since May of 2019 due to loss of her son  Patient has been self medicating with alcohol, with suicide ideation for some  However, this her 1st attempt with overdose with ingestion of pills  Unknown quantity medications consumed  Held all of her psychiatric medications initially  She has been PEC'd and requires one-to-one observation  Re-started prozac on 10/27  Appreciate Tele-psychiatry input with need for inpatient psychiatric treatment  Medically cleared to transfer to psych facility when accepted   working on placement

## 2019-10-28 NOTE — PLAN OF CARE
D/C plan:  Final Note   7 p  Report [phone]  Fax [phone]  ADT 30 completed  [facility]Dr Garcia[facility]         10/28/19 2259   Final Note   Assessment Type Final Discharge Note   Anticipated Discharge Disposition Psych   Hospital Follow Up  Appt(s) scheduled? Yes   Discharge plans and expectations educations in teach back method with documentation complete? Yes   Right Care Referral Info   Post Acute Recommendation Other   Referral Type psychiatric hospital (PEC/CEC)   Facility Name [place], [place]
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
## 2019-10-28 NOTE — SUBJECTIVE & OBJECTIVE
Interval History: Pt with no acute issues overnight, tremors overall resolved. Still with SI, but is bargaining to go home.     Review of Systems   Constitutional: Negative for chills and fever.   Respiratory: Negative for shortness of breath.    Cardiovascular: Negative for chest pain.   Psychiatric/Behavioral: Positive for suicidal ideas.     Objective:     Vital Signs (Most Recent):  Temp: 98.7 °F (37.1 °C) (10/28/19 0722)  Pulse: 64 (10/28/19 1000)  Resp: 18 (10/28/19 0722)  BP: (!) 118/57 (10/28/19 0722)  SpO2: (!) 92 % (10/28/19 0722) Vital Signs (24h Range):  Temp:  [98.2 °F (36.8 °C)-99.5 °F (37.5 °C)] 98.7 °F (37.1 °C)  Pulse:  [53-98] 64  Resp:  [16-20] 18  SpO2:  [92 %-95 %] 92 %  BP: (118-179)/(57-84) 118/57     Weight: 80.7 kg (177 lb 14.6 oz)  Body mass index is 28.72 kg/m².    Intake/Output Summary (Last 24 hours) at 10/28/2019 1200  Last data filed at 10/28/2019 0400  Gross per 24 hour   Intake 780 ml   Output 2700 ml   Net -1920 ml      Physical Exam   Constitutional: She is oriented to person, place, and time. She appears well-developed. No distress.   HENT:   Head: Normocephalic and atraumatic.   Eyes: Pupils are equal, round, and reactive to light. Conjunctivae and EOM are normal.   Neck: Normal range of motion.   Cardiovascular: Normal rate and regular rhythm.   Pulmonary/Chest: Effort normal and breath sounds normal.   Abdominal: Soft. Bowel sounds are normal. She exhibits no distension and no mass. There is no tenderness. There is no rebound and no guarding.   Musculoskeletal: Normal range of motion. She exhibits no edema.   Left arm ecchymoses   Neurological: She is alert and oriented to person, place, and time.   Skin: Skin is warm and dry. Capillary refill takes less than 2 seconds. She is not diaphoretic.   Psychiatric: Her mood appears anxious. She expresses impulsivity. She expresses suicidal ideation. She expresses no homicidal ideation. She expresses no homicidal plans.   Nursing note  and vitals reviewed.      Significant Labs: All pertinent labs within the past 24 hours have been reviewed.    Significant Imaging: I have reviewed and interpreted all pertinent imaging results/findings within the past 24 hours.

## 2019-10-29 NOTE — NURSING
Patient discharged. Education and legal documents given to transport.   Transported via [facility] via wheelchair. Personal belongings sent with the patient. Safety maintained.

## 2019-10-30 NOTE — DISCHARGE SUMMARY
[facility] Hospital Medicine  Discharge Summary      Patient Name: Earl Abdul  MRN: [id number]  Admission Date: 10/25/2019  Hospital Length of Stay: 2 days  Discharge Date and Time: 10/28/2019  6:51 PM  Attending Physician: Radha Martin MD  Discharging Provider: Radha Martin MD  Primary Care Provider: Izzy Garcia MD      HPI:   59-year-old female with HTN, HLP, sarcoidosis, polysubstance abuse (most notably alcohol and prescribed drugs), depression who presented with suicide attempt with handful of pills.  Patient is of for depression since May of 2019 when she lost her son.  This 16 is the date of his death, and every time around that time a month she reports she has difficulty with having suicidal ideation.  She stated that she wanted the pain to to stop, and for all of his to and and as why she took those pills.  It is reportedly handful of Neurontin, Skelaxin and trazodone.  She has had bouts of alcohol withdrawal in the past and has recently undergone rehab in September of this year.  She was successful with being sober for 2 months before she relapsed.  Last alcoholic beverage was last night, approximately 8:00 p.m. she drank approximately half a bottle of vodka.  In the ER, workup with blood alcohol level 284, negative acetaminophen, U tox negative, UA and chest x-ray are unremarkable, CMP with potassium of 3.3 otherwise normal, and platelets of 69.  She was placed under PEC.  While in the ER, she started have tremors.    * No surgery found *      Hospital Course:   Ms. Abdul presented with suicidal ideation with overt attempt at drug overdose and with ETOH withdrawal. PEC'd and with 1:1 sitter. Scheduled valium taper and ativan PRN for ETOH withdrawal. LFTS were normal and platelets improved. Pt was doing well on valium taper and symptoms controlled on PO regimen. Supplemented with thiamine and folic acid. Telepsych consulted and they were in agreement with continuing PEC.  She was later CEC'd. Her vitals were stable and she was well controlled on Valium taper. She was medically cleared for transfer to inpatient psychiatric facility on 10/28/2019 and  arranged for transfer to inpatient psychiatric facility for continued care.     Consults:  tele Psychiatry    Service: Hospital Medicine    Final Active Diagnoses:    Diagnosis Date Noted POA    PRINCIPAL PROBLEM:  Suicidal ideation [R45.851] 10/26/2019 Not Applicable    Alcohol withdrawal [F10.239] 02/07/2014 Yes    Thrombocytopenia [D69.6] 10/26/2019 Yes    Depression with anxiety [F41.8] 08/16/2017 Yes    Depression [F32.9] 02/07/2014 Yes    Alcohol dependence, continuous [F10.20] 02/07/2014 Yes    HTN (hypertension) [I10] 02/07/2014 Yes    Sarcoidosis of lung [D86.0] 02/07/2014 Yes    Tobacco abuse [Z72.0] 02/07/2014 Yes    Fibromyalgia [M79.7] 02/07/2014 Yes      Problems Resolved During this Admission:       Discharged Condition: good    Disposition: Another Health Care Inst*    Follow Up:  Follow-up Information     MD at inpatient psych facility.               Patient Instructions:      Diet Adult Regular     Activity as tolerated       Significant Diagnostic Studies:     Pending Diagnostic Studies:     None         Medications:  Reconciled Home Medications:      Medication List      START taking these medications    acetaminophen 325 MG tablet  Commonly known as:  TYLENOL  Take 2 tablets (650 mg total) by mouth every 6 (six) hours as needed.     diazePAM 5 MG tablet  Commonly known as:  VALIUM  Take 2 tablets every 8 hours x 2 days, then take 1 tablet every 8 hours x 2 days, then take 1 tablet every 12 hours x 2 days, then take 1 table daily x 2 days, then none.     FLUoxetine 10 MG capsule  Take 1 capsule (10 mg total) by mouth every evening.  Replaces:  FLUoxetine 10 MG Tab     folic acid-vit B6-vit B12 2.5-25-2 mg 2.5-25-2 mg Tab  Commonly known as:  FOLBIC or Equiv  Take 1 tablet by mouth once daily.      loperamide 2 mg capsule  Commonly known as:  IMODIUM  Take 1 capsule (2 mg total) by mouth daily as needed for Diarrhea.     LORazepam 1 MG tablet  Commonly known as:  ATIVAN  Take 1 tablet (1 mg total) by mouth every 6 (six) hours as needed.     mirtazapine 7.5 MG Tab  Commonly known as:  REMERON  Take 1 tablet (7.5 mg total) by mouth nightly as needed (insomnia).        CHANGE how you take these medications    levothyroxine 25 MCG tablet  Commonly known as:  SYNTHROID  Take 1 tablet (25 mcg total) by mouth before breakfast.  What changed:    · medication strength  · how much to take  · when to take this        CONTINUE taking these medications    omeprazole 10 MG capsule  Commonly known as:  PRILOSEC  Take 10 mg by mouth once daily.     ZOFRAN ODT 8 MG Tbdl  Generic drug:  ondansetron  Take 8 mg by mouth 3 (three) times daily as needed (for nausea and vomiting).        STOP taking these medications    ARIPiprazole 15 MG disintegrating tablet  Commonly known as:  ABILIFY     DEXILANT 60 mg capsule  Generic drug:  dexlansoprazole     doxepin 150 MG Cap  Commonly known as:  SINEQUAN     FLUoxetine 10 MG Tab  Replaced by:  FLUoxetine 10 MG capsule     gabapentin 300 MG capsule  Commonly known as:  NEURONTIN     HYDROcodone-acetaminophen  mg per tablet  Commonly known as:  NORCO     ibuprofen 600 MG tablet  Commonly known as:  ADVIL,MOTRIN     metaxalone 800 MG tablet  Commonly known as:  SKELAXIN     PROBIOTIC FORMULA ORAL     tiZANidine 4 mg Cap     traMADol 50 mg tablet  Commonly known as:  ULTRAM     TRAZODONE ORAL            Indwelling Lines/Drains at time of discharge:   Lines/Drains/Airways     None                 Time spent on the discharge of patient: 35 minutes  Patient was seen and examined on the date of discharge and determined to be suitable for discharge.         Radah Martin MD  Department of Hospital Medicine  [facility]

## 2019-12-05 ENCOUNTER — OFFICE VISIT (OUTPATIENT)
Dept: URGENT CARE | Facility: CLINIC | Age: 59
End: 2019-12-05
Payer: COMMERCIAL

## 2019-12-05 VITALS
RESPIRATION RATE: 16 BRPM | HEIGHT: 66 IN | BODY MASS INDEX: 26.52 KG/M2 | HEART RATE: 112 BPM | WEIGHT: 165 LBS | TEMPERATURE: 99 F | SYSTOLIC BLOOD PRESSURE: 163 MMHG | OXYGEN SATURATION: 94 % | DIASTOLIC BLOOD PRESSURE: 88 MMHG

## 2019-12-05 DIAGNOSIS — R11.10 VOMITING, INTRACTABILITY OF VOMITING NOT SPECIFIED, PRESENCE OF NAUSEA NOT SPECIFIED, UNSPECIFIED VOMITING TYPE: Primary | ICD-10-CM

## 2019-12-05 PROCEDURE — S0119 ONDANSETRON 4 MG: HCPCS | Mod: S$GLB,,, | Performed by: FAMILY MEDICINE

## 2019-12-05 PROCEDURE — 99214 PR OFFICE/OUTPT VISIT, EST, LEVL IV, 30-39 MIN: ICD-10-PCS | Mod: 25,S$GLB,, | Performed by: FAMILY MEDICINE

## 2019-12-05 PROCEDURE — 96372 THER/PROPH/DIAG INJ SC/IM: CPT | Mod: S$GLB,,, | Performed by: FAMILY MEDICINE

## 2019-12-05 PROCEDURE — S0119 PR ONDANSETRON, ORAL, 4MG: ICD-10-PCS | Mod: S$GLB,,, | Performed by: FAMILY MEDICINE

## 2019-12-05 PROCEDURE — 96372 PR INJECTION,THERAP/PROPH/DIAG2ST, IM OR SUBCUT: ICD-10-PCS | Mod: S$GLB,,, | Performed by: FAMILY MEDICINE

## 2019-12-05 PROCEDURE — 99214 OFFICE O/P EST MOD 30 MIN: CPT | Mod: 25,S$GLB,, | Performed by: FAMILY MEDICINE

## 2019-12-05 RX ORDER — SODIUM CHLORIDE 9 MG/ML
INJECTION, SOLUTION INTRAVENOUS
Status: COMPLETED | OUTPATIENT
Start: 2019-12-05 | End: 2019-12-05

## 2019-12-05 RX ORDER — ONDANSETRON 4 MG/1
4 TABLET, ORALLY DISINTEGRATING ORAL
Status: COMPLETED | OUTPATIENT
Start: 2019-12-05 | End: 2019-12-05

## 2019-12-05 RX ORDER — ONDANSETRON 2 MG/ML
4 INJECTION INTRAMUSCULAR; INTRAVENOUS
Status: COMPLETED | OUTPATIENT
Start: 2019-12-05 | End: 2019-12-05

## 2019-12-05 RX ORDER — ARIPIPRAZOLE 10 MG/1
5 TABLET ORAL DAILY
Status: ON HOLD | COMMUNITY
End: 2020-02-13 | Stop reason: HOSPADM

## 2019-12-05 RX ORDER — TRAZODONE HYDROCHLORIDE 100 MG/1
TABLET ORAL
Refills: 1 | Status: ON HOLD | COMMUNITY
Start: 2019-10-14 | End: 2021-04-23 | Stop reason: HOSPADM

## 2019-12-05 RX ORDER — ONDANSETRON 4 MG/1
8 TABLET, ORALLY DISINTEGRATING ORAL EVERY 8 HOURS PRN
Qty: 20 TABLET | Refills: 0 | Status: ON HOLD | OUTPATIENT
Start: 2019-12-05 | End: 2020-02-13 | Stop reason: HOSPADM

## 2019-12-05 RX ADMIN — SODIUM CHLORIDE: 9 INJECTION, SOLUTION INTRAVENOUS at 06:12

## 2019-12-05 RX ADMIN — ONDANSETRON 4 MG: 2 INJECTION INTRAMUSCULAR; INTRAVENOUS at 06:12

## 2019-12-05 RX ADMIN — ONDANSETRON 4 MG: 4 TABLET, ORALLY DISINTEGRATING ORAL at 05:12

## 2019-12-05 NOTE — PROGRESS NOTES
"Subjective:       Patient ID: Earl Abdul is a 59 y.o. female.    Vitals:  height is 5' 6" (1.676 m) and weight is 74.8 kg (165 lb). Her temperature is 98.5 °F (36.9 °C). Her blood pressure is 163/88 (abnormal) and her pulse is 112 (abnormal). Her respiration is 16 and oxygen saturation is 94% (abnormal).     Chief Complaint: Emesis and Diarrhea    Pt states nausea, vomiting and diarrhea x 5 days.  Feels dehydrated no fever.  No stomach pain.  She has history of diverticulitis peptic ulcer disease pancreatitis this is not feel similar.  She feels she is related to her IBS.  No recent travel antibiotic use raw food consumption.  No flu-like symptoms no sick contacts.  Requesting IV fluids no blood in her vomit or diarrhea, emesis and diarrhea x3 today    Emesis    This is a new problem. The current episode started in the past 7 days. The problem has been gradually improving. The emesis has an appearance of stomach contents. There has been no fever. Associated symptoms include diarrhea. Pertinent negatives include no arthralgias, chest pain, chills, coughing, dizziness, fever, headaches or myalgias. She has tried nothing for the symptoms.   Diarrhea    Associated symptoms include vomiting. Pertinent negatives include no arthralgias, chills, coughing, fever, headaches or myalgias.       Constitution: Negative for chills, fatigue and fever.   HENT: Negative for congestion and sore throat.    Neck: Negative for painful lymph nodes.   Cardiovascular: Negative for chest pain and leg swelling.   Eyes: Negative for double vision and blurred vision.   Respiratory: Negative for cough and shortness of breath.    Gastrointestinal: Positive for nausea, vomiting and diarrhea.   Genitourinary: Negative for dysuria, frequency, urgency and history of kidney stones.   Musculoskeletal: Negative for joint pain, joint swelling, muscle cramps and muscle ache.   Skin: Negative for color change, pale, rash and bruising. "   Allergic/Immunologic: Negative for seasonal allergies.   Neurological: Negative for dizziness, history of vertigo, light-headedness, passing out and headaches.   Hematologic/Lymphatic: Negative for swollen lymph nodes.   Psychiatric/Behavioral: Negative for nervous/anxious, sleep disturbance and depression. The patient is not nervous/anxious.        Objective:      Physical Exam   Constitutional: She is oriented to person, place, and time. She appears well-developed and well-nourished.   HENT:   Head: Normocephalic and atraumatic.   Right Ear: External ear normal.   Left Ear: External ear normal.   Nose: Nose normal.   Mouth/Throat: Mucous membranes are normal.   Eyes: Conjunctivae and lids are normal.   Neck: Trachea normal and full passive range of motion without pain. Neck supple.   Cardiovascular: Normal rate, regular rhythm and normal heart sounds.   Pulmonary/Chest: Effort normal and breath sounds normal. No respiratory distress.   Abdominal: Soft. Normal appearance and bowel sounds are normal. She exhibits no distension, no abdominal bruit, no pulsatile midline mass and no mass. There is no tenderness. There is no rigidity, no rebound, no guarding, no CVA tenderness, no tenderness at McBurney's point and negative Mccollum's sign.   Musculoskeletal: Normal range of motion. She exhibits no edema.   Neurological: She is alert and oriented to person, place, and time. She has normal strength.   Skin: Skin is warm, dry, intact, not diaphoretic and not pale.   Psychiatric: She has a normal mood and affect. Her speech is normal and behavior is normal. Judgment and thought content normal. Cognition and memory are normal.   Nursing note and vitals reviewed.        Assessment:       1. Vomiting, intractability of vomiting not specified, presence of nausea not specified, unspecified vomiting type        Plan:         Vomiting, intractability of vomiting not specified, presence of nausea not specified, unspecified  vomiting type  -     ondansetron disintegrating tablet 4 mg  -     Cancel: POCT Urinalysis, Dipstick, Automated, W/O Scope  -     ondansetron injection 4 mg  -     0.9%  NaCl infusion  -     ondansetron (ZOFRAN-ODT) 4 MG TbDL; Take 2 tablets (8 mg total) by mouth every 8 (eight) hours as needed.  Dispense: 20 tablet; Refill: 0     Patient vomited after the PO Zofran gave her IM Zofran and infused 500 mL normal saline a in the clinic.  IV was removed prior to discharge.  I attempted to get a urine sample but patient on cannot urinate in the clinic and did not want to wait she felt that she was well enough to go home.  We discussed ER precautions, she recently saw her GI about 2 weeks ago with a normal lab work    Patient Instructions   PLEASE READ YOUR DISCHARGE INSTRUCTIONS ENTIRELY AS IT CONTAINS IMPORTANT INFORMATION.    Take the zofran for nausea (it dissolves under your tongue)     Use gatorade/pedialyte or rehydration packets to help stay hydrated. Vitamin water and plain water do not contain rehydrating electrolytes.  Increase clear liquids (water, gatorade, pedialyte, broths, jello, etc) Hold off on solids for 12-18 hours. Then advance to BRAT diet (banana, rice, applesauce, tea, toast/crackers), then advance further as tolerated. Avoid spicy or fatty foods.   Use Peptobismol to help alleviate your diarrhea symptoms.     Avoid imodium unless you have more than 6 loose stools in 24 hours.       Wash hands frequently while sick. Avoid ibuprofen or other NSAIDS until you are well.     Please go to the ER if you experience worsening pain, blood in your vomit or stool, high fever, dizziness, fainting, swelling of your abdomen, inability to pass gas or stool.           Please arrange follow up with your primary medical clinic as soon as possible. You must understand that you've received an Urgent Care treatment only and that you may be released before all of your medical problems are known or treated. You, the  "patient, will arrange for follow up as instructed. If your symptoms worsen or fail to improve you should go to the Emergency Room.  WE CANNOT RULE OUT ALL POSSIBLE CAUSES OF YOUR SYMPTOMS IN THE URGENT CARE SETTING PLEASE GO TO THE ER IF YOU FEELS YOUR CONDITION IS WORSENING OR YOU WOULD LIKE EMERGENT EVALUATION.      Vomiting (Adult)  Vomiting is a common symptom that may be due to different causes. These include gastroenteritis ("stomach flu"), food poisoning and gastritis. There are other more serious causes of vomiting which may be hard to diagnose early in the illness. Therefore, it is important to watch for the warning signs listed below.  The main danger from repeated vomiting is dehydration. This is due to excess loss of water and minerals from the body. When this occurs, body fluids must be replaced.  Home care  · If symptoms are severe, rest at home for the next 24 hours.  · Because your symptoms may be from an infection, wash your hands frequently and well, and use alcohol-based  to avoid spreading the infection to others.  · Wash your hands for at least 20 seconds. Hum the happy birthday song twice for the correct length of time.  · Wash your hands after using the toilet, before and after preparing food, before eating food, after changing a diaper, cleaning a wound, caring for a sick person, and blowing your nose, coughing, or sneezing. You should also wash your hands after caring for someone who is sick, touching pet food, or treats, and touching an animal, or animal waste.  · You may use acetaminophen or NSAID medicines like ibuprofen or naproxen to control fever, unless another medicine was prescribed. If you have chronic liver or kidney disease or ever had a stomach ulcer or GI bleeding, talk with your doctor before using these medicines. Aspirin should never be used in anyone under 18 years of age who is ill with a fever. It may cause severe liver damage. Don't use NSAID medicines if you " are already taking one for another condition (like arthritis) or are on aspirin (such as for heart disease, or after a stroke)  · Avoid tobacco and alcohol use, which may worsen your symptoms.  · If medicines for vomiting were prescribed, take as directed.  · Once vomiting stops, then follow these guidelines:  During the first 12 to 24 hours follow the diet below:  · Fruit juices. Apple, grape juice, clear fruit drinks, and electrolyte replacement drinks.  · Beverages. Soft drinks without caffeine; mineral water (plain or flavored), decaffeinated tea and coffee.  · Soups. Clear broth, consommé and bouillon  · Desserts. Plain gelatin, popsicles and fruit juice bars. As you feel better, you may add 6-8 ounces of yogurt per day.  During the next 24 hours you may add the following to the above:  · Hot cereal, plain toast, bread, rolls, crackers  · Plain noodles, rice, mashed potatoes, chicken noodle or rice soup  · Unsweetened canned fruit (avoid pineapple), bananas  · Limit caffeine and chocolate. No spices or seasonings except salt.  During the next 24 hours:  Gradually resume a normal diet, as you feel better and your symptoms lessen.  Follow-up care  Follow up with your healthcare provider, or as advised.  When to seek medical advice  Call your healthcare provider right away if any of these occur:  · Constant right-sided lower abdominal pain or increasing general abdominal pain  · Continued vomiting (unable to keep liquids down) for 24 hours  · Frequent diarrhea (more than 5 times a day); blood (red or black color) or mucus in diarrhea  · Reduced urine output or extreme thirst  · Weakness, dizziness or fainting  · Unusually drowsy or confused  · Fever of 100.4°F (38°C) oral or higher, or as directed  · Yellow color of the eyes or skin  Date Last Reviewed: 11/16/2015  © 1530-7510 [employer]. [address] [place] [address], [place] [address]. All rights reserved. This information is not intended as a substitute  for professional medical care. Always follow your healthcare professional's instructions.

## 2019-12-06 NOTE — PATIENT INSTRUCTIONS
"PLEASE READ YOUR DISCHARGE INSTRUCTIONS ENTIRELY AS IT CONTAINS IMPORTANT INFORMATION.    Take the zofran for nausea (it dissolves under your tongue)     Use gatorade/pedialyte or rehydration packets to help stay hydrated. Vitamin water and plain water do not contain rehydrating electrolytes.  Increase clear liquids (water, gatorade, pedialyte, broths, jello, etc) Hold off on solids for 12-18 hours. Then advance to BRAT diet (banana, rice, applesauce, tea, toast/crackers), then advance further as tolerated. Avoid spicy or fatty foods.   Use Peptobismol to help alleviate your diarrhea symptoms.     Avoid imodium unless you have more than 6 loose stools in 24 hours.       Wash hands frequently while sick. Avoid ibuprofen or other NSAIDS until you are well.     Please go to the ER if you experience worsening pain, blood in your vomit or stool, high fever, dizziness, fainting, swelling of your abdomen, inability to pass gas or stool.           Please arrange follow up with your primary medical clinic as soon as possible. You must understand that you've received an Urgent Care treatment only and that you may be released before all of your medical problems are known or treated. You, the patient, will arrange for follow up as instructed. If your symptoms worsen or fail to improve you should go to the Emergency Room.  WE CANNOT RULE OUT ALL POSSIBLE CAUSES OF YOUR SYMPTOMS IN THE URGENT CARE SETTING PLEASE GO TO THE ER IF YOU FEELS YOUR CONDITION IS WORSENING OR YOU WOULD LIKE EMERGENT EVALUATION.      Vomiting (Adult)  Vomiting is a common symptom that may be due to different causes. These include gastroenteritis ("stomach flu"), food poisoning and gastritis. There are other more serious causes of vomiting which may be hard to diagnose early in the illness. Therefore, it is important to watch for the warning signs listed below.  The main danger from repeated vomiting is dehydration. This is due to excess loss of water and " minerals from the body. When this occurs, body fluids must be replaced.  Home care  · If symptoms are severe, rest at home for the next 24 hours.  · Because your symptoms may be from an infection, wash your hands frequently and well, and use alcohol-based  to avoid spreading the infection to others.  · Wash your hands for at least 20 seconds. Hum the happy birthday song twice for the correct length of time.  · Wash your hands after using the toilet, before and after preparing food, before eating food, after changing a diaper, cleaning a wound, caring for a sick person, and blowing your nose, coughing, or sneezing. You should also wash your hands after caring for someone who is sick, touching pet food, or treats, and touching an animal, or animal waste.  · You may use acetaminophen or NSAID medicines like ibuprofen or naproxen to control fever, unless another medicine was prescribed. If you have chronic liver or kidney disease or ever had a stomach ulcer or GI bleeding, talk with your doctor before using these medicines. Aspirin should never be used in anyone under 18 years of age who is ill with a fever. It may cause severe liver damage. Don't use NSAID medicines if you are already taking one for another condition (like arthritis) or are on aspirin (such as for heart disease, or after a stroke)  · Avoid tobacco and alcohol use, which may worsen your symptoms.  · If medicines for vomiting were prescribed, take as directed.  · Once vomiting stops, then follow these guidelines:  During the first 12 to 24 hours follow the diet below:  · Fruit juices. Apple, grape juice, clear fruit drinks, and electrolyte replacement drinks.  · Beverages. Soft drinks without caffeine; mineral water (plain or flavored), decaffeinated tea and coffee.  · Soups. Clear broth, consommé and bouillon  · Desserts. Plain gelatin, popsicles and fruit juice bars. As you feel better, you may add 6-8 ounces of yogurt per day.  During the  next 24 hours you may add the following to the above:  · Hot cereal, plain toast, bread, rolls, crackers  · Plain noodles, rice, mashed potatoes, chicken noodle or rice soup  · Unsweetened canned fruit (avoid pineapple), bananas  · Limit caffeine and chocolate. No spices or seasonings except salt.  During the next 24 hours:  Gradually resume a normal diet, as you feel better and your symptoms lessen.  Follow-up care  Follow up with your healthcare provider, or as advised.  When to seek medical advice  Call your healthcare provider right away if any of these occur:  · Constant right-sided lower abdominal pain or increasing general abdominal pain  · Continued vomiting (unable to keep liquids down) for 24 hours  · Frequent diarrhea (more than 5 times a day); blood (red or black color) or mucus in diarrhea  · Reduced urine output or extreme thirst  · Weakness, dizziness or fainting  · Unusually drowsy or confused  · Fever of 100.4°F (38°C) oral or higher, or as directed  · Yellow color of the eyes or skin  Date Last Reviewed: 11/16/2015  © 5912-3788 [employer]. [address] [place] [address], [place] [address]. All rights reserved. This information is not intended as a substitute for professional medical care. Always follow your healthcare professional's instructions.

## 2020-02-10 ENCOUNTER — HOSPITAL ENCOUNTER (INPATIENT)
Facility: OTHER | Age: 60
LOS: 3 days | Discharge: HOME | DRG: 392 | End: 2020-02-13
Attending: EMERGENCY MEDICINE | Admitting: EMERGENCY MEDICINE
Payer: COMMERCIAL

## 2020-02-10 DIAGNOSIS — E87.6 HYPOKALEMIA: ICD-10-CM

## 2020-02-10 DIAGNOSIS — I10 ESSENTIAL HYPERTENSION: Chronic | ICD-10-CM

## 2020-02-10 DIAGNOSIS — R11.2 NAUSEA & VOMITING: Primary | ICD-10-CM

## 2020-02-10 DIAGNOSIS — R10.10 PAIN OF UPPER ABDOMEN: ICD-10-CM

## 2020-02-10 DIAGNOSIS — R11.2 INTRACTABLE VOMITING WITH NAUSEA: ICD-10-CM

## 2020-02-10 PROBLEM — M70.60 GREATER TROCHANTERIC BURSITIS: Status: RESOLVED | Noted: 2019-10-10 | Resolved: 2020-02-10

## 2020-02-10 PROBLEM — R19.7 NAUSEA VOMITING AND DIARRHEA: Status: RESOLVED | Noted: 2017-07-27 | Resolved: 2020-02-10

## 2020-02-10 PROBLEM — G25.81 RLS (RESTLESS LEGS SYNDROME): Status: RESOLVED | Noted: 2017-08-16 | Resolved: 2020-02-10

## 2020-02-10 PROBLEM — R10.84 GENERALIZED ABDOMINAL PAIN: Status: RESOLVED | Noted: 2017-07-28 | Resolved: 2020-02-10

## 2020-02-10 PROBLEM — M62.81 MUSCLE WEAKNESS OF LOWER EXTREMITY: Status: RESOLVED | Noted: 2019-09-24 | Resolved: 2020-02-10

## 2020-02-10 PROBLEM — R45.851 SUICIDAL IDEATION: Status: RESOLVED | Noted: 2019-10-26 | Resolved: 2020-02-10

## 2020-02-10 PROBLEM — D69.6 THROMBOCYTOPENIA: Status: RESOLVED | Noted: 2019-10-26 | Resolved: 2020-02-10

## 2020-02-10 PROBLEM — K27.9 PUD (PEPTIC ULCER DISEASE): Status: RESOLVED | Noted: 2017-08-16 | Resolved: 2020-02-10

## 2020-02-10 PROBLEM — R74.01 TRANSAMINITIS: Status: ACTIVE | Noted: 2020-02-10

## 2020-02-10 PROBLEM — R10.9 ABDOMINAL PAIN: Status: RESOLVED | Noted: 2017-07-20 | Resolved: 2020-02-10

## 2020-02-10 PROBLEM — R53.1 WEAKNESS: Status: RESOLVED | Noted: 2018-11-14 | Resolved: 2020-02-10

## 2020-02-10 PROBLEM — Z86.2 HISTORY OF SARCOIDOSIS: Chronic | Status: ACTIVE | Noted: 2017-07-26

## 2020-02-10 PROBLEM — R07.9 CHEST PAIN: Status: RESOLVED | Noted: 2017-07-26 | Resolved: 2020-02-10

## 2020-02-10 LAB
ALBUMIN SERPL BCP-MCNC: 4.1 G/DL (ref 3.5–5.2)
ALP SERPL-CCNC: 60 U/L (ref 55–135)
ALT SERPL W/O P-5'-P-CCNC: 115 U/L (ref 10–44)
ANION GAP SERPL CALC-SCNC: 11 MMOL/L (ref 8–16)
ANION GAP SERPL CALC-SCNC: 9 MMOL/L (ref 8–16)
AST SERPL-CCNC: 94 U/L (ref 10–40)
BASOPHILS # BLD AUTO: 0.04 K/UL (ref 0–0.2)
BASOPHILS NFR BLD: 0.4 % (ref 0–1.9)
BILIRUB SERPL-MCNC: 0.7 MG/DL (ref 0.1–1)
BILIRUB UR QL STRIP: ABNORMAL
BUN SERPL-MCNC: 7 MG/DL (ref 8–23)
BUN SERPL-MCNC: 7 MG/DL (ref 8–23)
CALCIUM SERPL-MCNC: 8.5 MG/DL (ref 8.7–10.5)
CALCIUM SERPL-MCNC: 9.3 MG/DL (ref 8.7–10.5)
CHLORIDE SERPL-SCNC: 102 MMOL/L (ref 95–110)
CHLORIDE SERPL-SCNC: 98 MMOL/L (ref 95–110)
CLARITY UR: CLEAR
CO2 SERPL-SCNC: 26 MMOL/L (ref 23–29)
CO2 SERPL-SCNC: 28 MMOL/L (ref 23–29)
COLOR UR: YELLOW
CREAT SERPL-MCNC: 0.7 MG/DL (ref 0.5–1.4)
CREAT SERPL-MCNC: 0.8 MG/DL (ref 0.5–1.4)
DIFFERENTIAL METHOD: ABNORMAL
EOSINOPHIL # BLD AUTO: 0 K/UL (ref 0–0.5)
EOSINOPHIL NFR BLD: 0.2 % (ref 0–8)
ERYTHROCYTE [DISTWIDTH] IN BLOOD BY AUTOMATED COUNT: 16.4 % (ref 11.5–14.5)
EST. GFR  (AFRICAN AMERICAN): >60 ML/MIN/1.73 M^2
EST. GFR  (AFRICAN AMERICAN): >60 ML/MIN/1.73 M^2
EST. GFR  (NON AFRICAN AMERICAN): >60 ML/MIN/1.73 M^2
EST. GFR  (NON AFRICAN AMERICAN): >60 ML/MIN/1.73 M^2
GLUCOSE SERPL-MCNC: 103 MG/DL (ref 70–110)
GLUCOSE SERPL-MCNC: 118 MG/DL (ref 70–110)
GLUCOSE UR QL STRIP: NEGATIVE
HCT VFR BLD AUTO: 45.6 % (ref 37–48.5)
HGB BLD-MCNC: 14.2 G/DL (ref 12–16)
HGB UR QL STRIP: NEGATIVE
IMM GRANULOCYTES # BLD AUTO: 0.04 K/UL (ref 0–0.04)
IMM GRANULOCYTES NFR BLD AUTO: 0.4 % (ref 0–0.5)
KETONES UR QL STRIP: ABNORMAL
LEUKOCYTE ESTERASE UR QL STRIP: NEGATIVE
LIPASE SERPL-CCNC: 72 U/L (ref 4–60)
LYMPHOCYTES # BLD AUTO: 3.9 K/UL (ref 1–4.8)
LYMPHOCYTES NFR BLD: 38.7 % (ref 18–48)
MAGNESIUM SERPL-MCNC: 1.8 MG/DL (ref 1.6–2.6)
MCH RBC QN AUTO: 27.6 PG (ref 27–31)
MCHC RBC AUTO-ENTMCNC: 31.1 G/DL (ref 32–36)
MCV RBC AUTO: 89 FL (ref 82–98)
MONOCYTES # BLD AUTO: 0.9 K/UL (ref 0.3–1)
MONOCYTES NFR BLD: 9.1 % (ref 4–15)
NEUTROPHILS # BLD AUTO: 5.2 K/UL (ref 1.8–7.7)
NEUTROPHILS NFR BLD: 51.2 % (ref 38–73)
NITRITE UR QL STRIP: NEGATIVE
NRBC BLD-RTO: 0 /100 WBC
PH UR STRIP: 8 [PH] (ref 5–8)
PHOSPHATE SERPL-MCNC: 2.9 MG/DL (ref 2.7–4.5)
PLATELET # BLD AUTO: 231 K/UL (ref 150–350)
PMV BLD AUTO: 10.5 FL (ref 9.2–12.9)
POTASSIUM SERPL-SCNC: 2.7 MMOL/L (ref 3.5–5.1)
POTASSIUM SERPL-SCNC: 3.4 MMOL/L (ref 3.5–5.1)
PROT SERPL-MCNC: 7.5 G/DL (ref 6–8.4)
PROT UR QL STRIP: NEGATIVE
RBC # BLD AUTO: 5.14 M/UL (ref 4–5.4)
SODIUM SERPL-SCNC: 137 MMOL/L (ref 136–145)
SODIUM SERPL-SCNC: 137 MMOL/L (ref 136–145)
SP GR UR STRIP: 1.02 (ref 1–1.03)
URN SPEC COLLECT METH UR: ABNORMAL
UROBILINOGEN UR STRIP-ACNC: NEGATIVE EU/DL
WBC # BLD AUTO: 10.15 K/UL (ref 3.9–12.7)

## 2020-02-10 PROCEDURE — 96375 TX/PRO/DX INJ NEW DRUG ADDON: CPT

## 2020-02-10 PROCEDURE — 25000003 PHARM REV CODE 250: Performed by: INTERNAL MEDICINE

## 2020-02-10 PROCEDURE — 81003 URINALYSIS AUTO W/O SCOPE: CPT

## 2020-02-10 PROCEDURE — 80053 COMPREHEN METABOLIC PANEL: CPT

## 2020-02-10 PROCEDURE — 84100 ASSAY OF PHOSPHORUS: CPT

## 2020-02-10 PROCEDURE — 36415 COLL VENOUS BLD VENIPUNCTURE: CPT

## 2020-02-10 PROCEDURE — 93010 EKG 12-LEAD: ICD-10-PCS | Mod: ,,, | Performed by: INTERNAL MEDICINE

## 2020-02-10 PROCEDURE — 96374 THER/PROPH/DIAG INJ IV PUSH: CPT

## 2020-02-10 PROCEDURE — 99223 PR INITIAL HOSPITAL CARE,LEVL III: ICD-10-PCS | Mod: ,,, | Performed by: INTERNAL MEDICINE

## 2020-02-10 PROCEDURE — 11000001 HC ACUTE MED/SURG PRIVATE ROOM

## 2020-02-10 PROCEDURE — 25000003 PHARM REV CODE 250: Performed by: EMERGENCY MEDICINE

## 2020-02-10 PROCEDURE — 99285 EMERGENCY DEPT VISIT HI MDM: CPT | Mod: 25

## 2020-02-10 PROCEDURE — S0028 INJECTION, FAMOTIDINE, 20 MG: HCPCS | Performed by: EMERGENCY MEDICINE

## 2020-02-10 PROCEDURE — 63600175 PHARM REV CODE 636 W HCPCS: Performed by: INTERNAL MEDICINE

## 2020-02-10 PROCEDURE — 83735 ASSAY OF MAGNESIUM: CPT

## 2020-02-10 PROCEDURE — 85025 COMPLETE CBC W/AUTO DIFF WBC: CPT

## 2020-02-10 PROCEDURE — 83690 ASSAY OF LIPASE: CPT

## 2020-02-10 PROCEDURE — 93010 ELECTROCARDIOGRAM REPORT: CPT | Mod: ,,, | Performed by: INTERNAL MEDICINE

## 2020-02-10 PROCEDURE — C9113 INJ PANTOPRAZOLE SODIUM, VIA: HCPCS | Performed by: INTERNAL MEDICINE

## 2020-02-10 PROCEDURE — 63600175 PHARM REV CODE 636 W HCPCS: Performed by: EMERGENCY MEDICINE

## 2020-02-10 PROCEDURE — 99223 1ST HOSP IP/OBS HIGH 75: CPT | Mod: ,,, | Performed by: INTERNAL MEDICINE

## 2020-02-10 PROCEDURE — 80048 BASIC METABOLIC PNL TOTAL CA: CPT

## 2020-02-10 PROCEDURE — 93005 ELECTROCARDIOGRAM TRACING: CPT

## 2020-02-10 RX ORDER — POTASSIUM CHLORIDE 7.45 MG/ML
10 INJECTION INTRAVENOUS
Status: COMPLETED | OUTPATIENT
Start: 2020-02-10 | End: 2020-02-10

## 2020-02-10 RX ORDER — POTASSIUM CHLORIDE 20 MEQ/15ML
40 SOLUTION ORAL ONCE
Status: COMPLETED | OUTPATIENT
Start: 2020-02-10 | End: 2020-02-10

## 2020-02-10 RX ORDER — TALC
6 POWDER (GRAM) TOPICAL NIGHTLY PRN
Status: DISCONTINUED | OUTPATIENT
Start: 2020-02-10 | End: 2020-02-13 | Stop reason: HOSPADM

## 2020-02-10 RX ORDER — KETOROLAC TROMETHAMINE 30 MG/ML
15 INJECTION, SOLUTION INTRAMUSCULAR; INTRAVENOUS EVERY 6 HOURS PRN
Status: ACTIVE | OUTPATIENT
Start: 2020-02-10 | End: 2020-02-13

## 2020-02-10 RX ORDER — ACETAMINOPHEN 325 MG/1
650 TABLET ORAL EVERY 6 HOURS PRN
Status: DISCONTINUED | OUTPATIENT
Start: 2020-02-10 | End: 2020-02-13 | Stop reason: HOSPADM

## 2020-02-10 RX ORDER — SODIUM CHLORIDE 0.9 % (FLUSH) 0.9 %
10 SYRINGE (ML) INJECTION
Status: CANCELLED | OUTPATIENT
Start: 2020-02-10

## 2020-02-10 RX ORDER — PANTOPRAZOLE SODIUM 40 MG/10ML
40 INJECTION, POWDER, LYOPHILIZED, FOR SOLUTION INTRAVENOUS DAILY
Status: DISCONTINUED | OUTPATIENT
Start: 2020-02-10 | End: 2020-02-13 | Stop reason: HOSPADM

## 2020-02-10 RX ORDER — SODIUM CHLORIDE 0.9 % (FLUSH) 0.9 %
5 SYRINGE (ML) INJECTION
Status: DISCONTINUED | OUTPATIENT
Start: 2020-02-10 | End: 2020-02-13 | Stop reason: HOSPADM

## 2020-02-10 RX ORDER — TRAZODONE HYDROCHLORIDE 100 MG/1
300 TABLET ORAL NIGHTLY PRN
Status: DISCONTINUED | OUTPATIENT
Start: 2020-02-10 | End: 2020-02-13 | Stop reason: HOSPADM

## 2020-02-10 RX ORDER — ONDANSETRON 2 MG/ML
4 INJECTION INTRAMUSCULAR; INTRAVENOUS EVERY 6 HOURS
Status: DISCONTINUED | OUTPATIENT
Start: 2020-02-10 | End: 2020-02-13 | Stop reason: HOSPADM

## 2020-02-10 RX ORDER — FAMOTIDINE 10 MG/ML
20 INJECTION INTRAVENOUS
Status: COMPLETED | OUTPATIENT
Start: 2020-02-10 | End: 2020-02-10

## 2020-02-10 RX ORDER — AMLODIPINE BESYLATE 5 MG/1
10 TABLET ORAL DAILY
Status: DISCONTINUED | OUTPATIENT
Start: 2020-02-10 | End: 2020-02-13 | Stop reason: HOSPADM

## 2020-02-10 RX ORDER — POTASSIUM CHLORIDE 7.45 MG/ML
10 INJECTION INTRAVENOUS
Status: DISCONTINUED | OUTPATIENT
Start: 2020-02-10 | End: 2020-02-10

## 2020-02-10 RX ORDER — ONDANSETRON 2 MG/ML
4 INJECTION INTRAMUSCULAR; INTRAVENOUS EVERY 8 HOURS PRN
Status: DISCONTINUED | OUTPATIENT
Start: 2020-02-10 | End: 2020-02-10

## 2020-02-10 RX ORDER — DEXTROSE MONOHYDRATE, SODIUM CHLORIDE, AND POTASSIUM CHLORIDE 50; 1.49; 9 G/1000ML; G/1000ML; G/1000ML
1000 INJECTION, SOLUTION INTRAVENOUS
Status: DISCONTINUED | OUTPATIENT
Start: 2020-02-10 | End: 2020-02-10

## 2020-02-10 RX ORDER — HYOSCYAMINE SULFATE 0.12 MG/1
0.12 TABLET SUBLINGUAL
Status: COMPLETED | OUTPATIENT
Start: 2020-02-10 | End: 2020-02-10

## 2020-02-10 RX ORDER — ONDANSETRON 2 MG/ML
4 INJECTION INTRAMUSCULAR; INTRAVENOUS EVERY 8 HOURS PRN
Status: CANCELLED | OUTPATIENT
Start: 2020-02-10

## 2020-02-10 RX ORDER — DICYCLOMINE HYDROCHLORIDE 10 MG/ML
20 INJECTION INTRAMUSCULAR 4 TIMES DAILY
Status: DISCONTINUED | OUTPATIENT
Start: 2020-02-10 | End: 2020-02-13

## 2020-02-10 RX ORDER — SODIUM CHLORIDE, SODIUM LACTATE, POTASSIUM CHLORIDE, CALCIUM CHLORIDE 600; 310; 30; 20 MG/100ML; MG/100ML; MG/100ML; MG/100ML
INJECTION, SOLUTION INTRAVENOUS CONTINUOUS
Status: DISCONTINUED | OUTPATIENT
Start: 2020-02-10 | End: 2020-02-13

## 2020-02-10 RX ORDER — KETOROLAC TROMETHAMINE 30 MG/ML
10 INJECTION, SOLUTION INTRAMUSCULAR; INTRAVENOUS
Status: COMPLETED | OUTPATIENT
Start: 2020-02-10 | End: 2020-02-10

## 2020-02-10 RX ORDER — POTASSIUM CHLORIDE 7.45 MG/ML
10 INJECTION INTRAVENOUS
Status: DISPENSED | OUTPATIENT
Start: 2020-02-10 | End: 2020-02-10

## 2020-02-10 RX ORDER — FLUOXETINE HYDROCHLORIDE 20 MG/1
20 CAPSULE ORAL NIGHTLY
Status: DISCONTINUED | OUTPATIENT
Start: 2020-02-10 | End: 2020-02-13 | Stop reason: HOSPADM

## 2020-02-10 RX ORDER — LORAZEPAM 1 MG/1
1 TABLET ORAL EVERY 6 HOURS PRN
Status: DISCONTINUED | OUTPATIENT
Start: 2020-02-10 | End: 2020-02-11

## 2020-02-10 RX ORDER — ONDANSETRON 2 MG/ML
4 INJECTION INTRAMUSCULAR; INTRAVENOUS
Status: COMPLETED | OUTPATIENT
Start: 2020-02-10 | End: 2020-02-10

## 2020-02-10 RX ORDER — HYDRALAZINE HYDROCHLORIDE 20 MG/ML
10 INJECTION INTRAMUSCULAR; INTRAVENOUS EVERY 6 HOURS PRN
Status: DISCONTINUED | OUTPATIENT
Start: 2020-02-10 | End: 2020-02-13 | Stop reason: HOSPADM

## 2020-02-10 RX ADMIN — ONDANSETRON 4 MG: 2 INJECTION INTRAMUSCULAR; INTRAVENOUS at 03:02

## 2020-02-10 RX ADMIN — DICYCLOMINE HYDROCHLORIDE 20 MG: 20 INJECTION, SOLUTION INTRAMUSCULAR at 08:02

## 2020-02-10 RX ADMIN — TRAZODONE HYDROCHLORIDE 300 MG: 100 TABLET ORAL at 10:02

## 2020-02-10 RX ADMIN — PANTOPRAZOLE SODIUM 40 MG: 40 INJECTION, POWDER, LYOPHILIZED, FOR SOLUTION INTRAVENOUS at 03:02

## 2020-02-10 RX ADMIN — POTASSIUM CHLORIDE 10 MEQ: 7.46 INJECTION, SOLUTION INTRAVENOUS at 08:02

## 2020-02-10 RX ADMIN — FAMOTIDINE 20 MG: 10 INJECTION, SOLUTION INTRAVENOUS at 09:02

## 2020-02-10 RX ADMIN — ACETAMINOPHEN 650 MG: 325 TABLET ORAL at 06:02

## 2020-02-10 RX ADMIN — AMLODIPINE BESYLATE 10 MG: 5 TABLET ORAL at 02:02

## 2020-02-10 RX ADMIN — LORAZEPAM 1 MG: 1 TABLET ORAL at 05:02

## 2020-02-10 RX ADMIN — POTASSIUM CHLORIDE 10 MEQ: 7.46 INJECTION, SOLUTION INTRAVENOUS at 12:02

## 2020-02-10 RX ADMIN — POTASSIUM CHLORIDE 40 MEQ: 40 SOLUTION ORAL at 11:02

## 2020-02-10 RX ADMIN — POTASSIUM CHLORIDE 10 MEQ: 7.46 INJECTION, SOLUTION INTRAVENOUS at 04:02

## 2020-02-10 RX ADMIN — KETOROLAC TROMETHAMINE 10 MG: 30 INJECTION, SOLUTION INTRAMUSCULAR at 11:02

## 2020-02-10 RX ADMIN — DICYCLOMINE HYDROCHLORIDE 20 MG: 20 INJECTION, SOLUTION INTRAMUSCULAR at 02:02

## 2020-02-10 RX ADMIN — ONDANSETRON 4 MG: 2 INJECTION INTRAMUSCULAR; INTRAVENOUS at 05:02

## 2020-02-10 RX ADMIN — ONDANSETRON 4 MG: 2 INJECTION INTRAMUSCULAR; INTRAVENOUS at 09:02

## 2020-02-10 RX ADMIN — HYOSCYAMINE SULFATE 0.12 MG: 0.12 TABLET, ORALLY DISINTEGRATING ORAL at 09:02

## 2020-02-10 RX ADMIN — POTASSIUM CHLORIDE 10 MEQ: 7.46 INJECTION, SOLUTION INTRAVENOUS at 06:02

## 2020-02-10 RX ADMIN — FLUOXETINE 20 MG: 20 CAPSULE ORAL at 08:02

## 2020-02-10 RX ADMIN — Medication 6 MG: at 10:02

## 2020-02-10 RX ADMIN — DICYCLOMINE HYDROCHLORIDE 20 MG: 20 INJECTION, SOLUTION INTRAMUSCULAR at 05:02

## 2020-02-10 RX ADMIN — SODIUM CHLORIDE, SODIUM LACTATE, POTASSIUM CHLORIDE, AND CALCIUM CHLORIDE: .6; .31; .03; .02 INJECTION, SOLUTION INTRAVENOUS at 11:02

## 2020-02-10 RX ADMIN — HYDRALAZINE HYDROCHLORIDE 10 MG: 20 INJECTION INTRAMUSCULAR; INTRAVENOUS at 08:02

## 2020-02-10 NOTE — SUBJECTIVE & OBJECTIVE
Past Medical History:   Diagnosis Date    Anemia     Anemia     Depression     Diverticulitis     Fatty liver     GERD (gastroesophageal reflux disease)     Hyperlipidemia     Hypertension     Pancreatitis     Peptic ulcer disease     Polysubstance abuse     Posterior reversible encephalopathy syndrome     Sarcoidosis of lung     Sarcoidosis of lung     over 30 yrs ago    Seizures     7/2017    Suicide attempt     Suicide ideation      Past Surgical History:   Procedure Laterality Date    APPENDECTOMY      COLONOSCOPY N/A 7/28/2017    Procedure: COLONOSCOPY;  Surgeon: Aaron Alvarado MD;  Location: [facility] ENDO;  Service: Endoscopy;  Laterality: N/A;    ESOPHAGOGASTRODUODENOSCOPY  10/7/2016, 11/6/2014    2016 - gastritis, duodenitis, 2014 erosive gastritis    FLEXIBLE SIGMOIDOSCOPY  11/06/2014    colitis    HYSTERECTOMY      INJECTION OF JOINT Right 10/10/2019    Procedure: Injection, Joint RIGHT ILIOPSOAS BURSA/TENDON INJECTION AND RIGHT GLUTEAL TENDON INJECTION WITH STEROID AND LIDOCAINE;  Surgeon: Guillaume Rico MD;  Location: [facility] PAIN MGT;  Service: Pain Management;  Laterality: Right;  NEEDS CONSENT    mediastenoscopy      TONSILLECTOMY N/A 1970    TUBAL LIGATION         Review of patient's allergies indicates:   Allergen Reactions    Fentanyl Other (See Comments)     Other reaction(s): Itching    Lortab  [hydrocodone-acetaminophen] Other (See Comments)    Phenothiazines        No current facility-administered medications on file prior to encounter.      Current Outpatient Medications on File Prior to Encounter   Medication Sig    acetaminophen (TYLENOL) 325 MG tablet Take 2 tablets (650 mg total) by mouth every 6 (six) hours as needed.    FLUoxetine 10 MG capsule Take 1 capsule (10 mg total) by mouth every evening.    folic acid-vit B6-vit B12 2.5-25-2 mg (FOLBIC OR EQUIV) 2.5-25-2 mg Tab Take 1 tablet by mouth once daily.    levothyroxine (SYNTHROID) 25 MCG tablet Take 1 tablet (25  mcg total) by mouth before breakfast.    omeprazole (PRILOSEC) 10 MG capsule Take 10 mg by mouth once daily.    ondansetron (ZOFRAN ODT) 8 MG TbDL Take 8 mg by mouth 3 (three) times daily as needed (for nausea and vomiting).    ondansetron (ZOFRAN-ODT) 4 MG TbDL Take 2 tablets (8 mg total) by mouth every 8 (eight) hours as needed.    traZODone (DESYREL) 100 MG tablet TK 3 TS PO HS    ARIPiprazole (ABILIFY) 10 MG Tab Take 5 mg by mouth once daily.    diazePAM (VALIUM) 5 MG tablet Take 2 tablets every 8 hours x 2 days, then take 1 tablet every 8 hours x 2 days, then take 1 tablet every 12 hours x 2 days, then take 1 table daily x 2 days, then none. (Patient not taking: Reported on 12/5/2019)    LORazepam (ATIVAN) 1 MG tablet Take 1 tablet (1 mg total) by mouth every 6 (six) hours as needed.    mirtazapine (REMERON) 7.5 MG Tab Take 1 tablet (7.5 mg total) by mouth nightly as needed (insomnia). (Patient not taking: Reported on 12/5/2019)     Family History     Problem Relation (Age of Onset)    Breast cancer Maternal Aunt, Daughter    Colon cancer Maternal Uncle    Diabetes Father, Mother    Heart attack Father    Hypertension Father, Mother        Tobacco Use    Smoking status: Current Every Day Smoker     Packs/day: 1.00     Years: 30.00     Pack years: 30.00     Types: Cigarettes    Smokeless tobacco: Never Used   Substance and Sexual Activity    Alcohol use: Yes    Drug use: No    Sexual activity: Yes     Birth control/protection: Surgical     Review of Systems   Constitutional: Positive for chills and fatigue. Negative for fever.   HENT: Negative for sore throat and trouble swallowing.    Eyes: Negative for pain and visual disturbance.   Respiratory: Positive for cough. Negative for shortness of breath.    Cardiovascular: Positive for palpitations. Negative for chest pain.   Gastrointestinal: Positive for abdominal pain, nausea and vomiting.   Genitourinary: Negative for difficulty urinating and  dysuria.   Musculoskeletal: Negative for arthralgias and myalgias.   Skin: Negative for rash and wound.   Neurological: Negative for weakness and numbness.     Objective:     Vital Signs (Most Recent):  Temp: 98.3 °F (36.8 °C) (02/10/20 1356)  Pulse: 94 (02/10/20 1443)  Resp: 16 (02/10/20 1343)  BP: (!) 158/69 (02/10/20 1503)  SpO2: 96 % (02/10/20 1343) Vital Signs (24h Range):  Temp:  [98.3 °F (36.8 °C)-98.9 °F (37.2 °C)] 98.3 °F (36.8 °C)  Pulse:  [] 94  Resp:  [16-18] 16  SpO2:  [94 %-99 %] 96 %  BP: (118-188)/(57-93) 158/69     Weight: 24.9 kg (55 lb)  Body mass index is 8.88 kg/m².    Physical Exam   Constitutional: She is oriented to person, place, and time. She appears well-developed. No distress.   HENT:   Head: Normocephalic and atraumatic.   Eyes: Conjunctivae are normal. Right eye exhibits no discharge. Left eye exhibits no discharge.   Cardiovascular: Normal rate and intact distal pulses.   Pulmonary/Chest: Effort normal. No respiratory distress.   Abdominal: Soft. There is tenderness (epigastric, RUQ).   Musculoskeletal: Normal range of motion. She exhibits no edema.   Neurological: She is alert and oriented to person, place, and time.   Skin: Skin is warm and dry.   Nursing note and vitals reviewed.    Significant Labs:   CBC:  Recent Labs   Lab 02/10/20  0931   WBC 10.15   HGB 14.2   HCT 45.6      GRAN 51.2  5.2   LYMPH 38.7  3.9   MONO 9.1  0.9   EOS 0.0   BASO 0.04      CMP:  Recent Labs   Lab 02/10/20  0931 02/10/20  1501    137   K 2.7* 3.4*   CL 98 102   CO2 28 26   BUN 7* 7*   CREATININE 0.8 0.7   * 103   CALCIUM 9.3 8.5*   MG 1.8  --    PHOS 2.9  --    ALKPHOS 60  --    AST 94*  --    *  --    BILITOT 0.7  --    PROT 7.5  --    ALBUMIN 4.1  --    ANIONGAP 11 9      Significant Imaging:   No new imaging at time of admission.

## 2020-02-10 NOTE — H&P
"[facility] Hospital Medicine  History & Physical    Patient Name: Earl Abdul  MRN: [id number]  Admission Date: 2/10/2020  Attending Physician: RADHA Andre MD   Primary Care Provider: Izzy Garcia MD    Patient information was obtained from patient, spouse/SO, past medical records and ER records.     Subjective:     Principal Problem:Intractable vomiting with nausea    Chief Complaint:   Chief Complaint   Patient presents with    Vomiting     pt with c/o vomiting and ataxia . pt was d/c from hospital on saturday and is not any better.         HPI: Ms. Abdul is a 59/F with PMH HTN, h/o alcohol abuse with fatty liver disease, chronic back pain s/p surgery with subsequent opiate dependence, transitioned to suboxone with recent detox in [place], recent admission to [facility] in [place], [place] from 02/06 - 02/08 with drug-induced akathisia who presented to ED 02/10 with nausea and vomiting for days. She reports that she was having some abdominal discomfort, nausea and vomiting while still in [place] but that her symptoms have persisted. She reports minimal PO intake and "can't take any pills or even get water down." ED evaluation was notable for K of 2.7; she was unable to tolerate PO potassium repletion. Hospital medicine was subsequently contacted for admission.    Past Medical History:   Diagnosis Date    Anemia     Anemia     Depression     Diverticulitis     Fatty liver     GERD (gastroesophageal reflux disease)     Hyperlipidemia     Hypertension     Pancreatitis     Peptic ulcer disease     Polysubstance abuse     Posterior reversible encephalopathy syndrome     Sarcoidosis of lung     Sarcoidosis of lung     over 30 yrs ago    Seizures     7/2017    Suicide attempt     Suicide ideation      Past Surgical History:   Procedure Laterality Date    APPENDECTOMY      COLONOSCOPY N/A 7/28/2017    Procedure: COLONOSCOPY;  Surgeon: Aaron Alvarado MD;  Location: " [facility] ENDO;  Service: Endoscopy;  Laterality: N/A;    ESOPHAGOGASTRODUODENOSCOPY  10/7/2016, 11/6/2014    2016 - gastritis, duodenitis, 2014 erosive gastritis    FLEXIBLE SIGMOIDOSCOPY  11/06/2014    colitis    HYSTERECTOMY      INJECTION OF JOINT Right 10/10/2019    Procedure: Injection, Joint RIGHT ILIOPSOAS BURSA/TENDON INJECTION AND RIGHT GLUTEAL TENDON INJECTION WITH STEROID AND LIDOCAINE;  Surgeon: Guillaume Rico MD;  Location: [facility];  Service: Pain Management;  Laterality: Right;  NEEDS CONSENT    mediastenoscopy      TONSILLECTOMY N/A 1970    TUBAL LIGATION         Review of patient's allergies indicates:   Allergen Reactions    Fentanyl Other (See Comments)     Other reaction(s): Itching    Lortab  [hydrocodone-acetaminophen] Other (See Comments)    Phenothiazines        No current facility-administered medications on file prior to encounter.      Current Outpatient Medications on File Prior to Encounter   Medication Sig    acetaminophen (TYLENOL) 325 MG tablet Take 2 tablets (650 mg total) by mouth every 6 (six) hours as needed.    FLUoxetine 10 MG capsule Take 1 capsule (10 mg total) by mouth every evening.    folic acid-vit B6-vit B12 2.5-25-2 mg (FOLBIC OR EQUIV) 2.5-25-2 mg Tab Take 1 tablet by mouth once daily.    levothyroxine (SYNTHROID) 25 MCG tablet Take 1 tablet (25 mcg total) by mouth before breakfast.    omeprazole (PRILOSEC) 10 MG capsule Take 10 mg by mouth once daily.    ondansetron (ZOFRAN ODT) 8 MG TbDL Take 8 mg by mouth 3 (three) times daily as needed (for nausea and vomiting).    ondansetron (ZOFRAN-ODT) 4 MG TbDL Take 2 tablets (8 mg total) by mouth every 8 (eight) hours as needed.    traZODone (DESYREL) 100 MG tablet TK 3 TS PO HS    ARIPiprazole (ABILIFY) 10 MG Tab Take 5 mg by mouth once daily.    diazePAM (VALIUM) 5 MG tablet Take 2 tablets every 8 hours x 2 days, then take 1 tablet every 8 hours x 2 days, then take 1 tablet every 12 hours x 2 days, then  take 1 table daily x 2 days, then none. (Patient not taking: Reported on 12/5/2019)    LORazepam (ATIVAN) 1 MG tablet Take 1 tablet (1 mg total) by mouth every 6 (six) hours as needed.    mirtazapine (REMERON) 7.5 MG Tab Take 1 tablet (7.5 mg total) by mouth nightly as needed (insomnia). (Patient not taking: Reported on 12/5/2019)     Family History     Problem Relation (Age of Onset)    Breast cancer Maternal Aunt, Daughter    Colon cancer Maternal Uncle    Diabetes Father, Mother    Heart attack Father    Hypertension Father, Mother        Tobacco Use    Smoking status: Current Every Day Smoker     Packs/day: 1.00     Years: 30.00     Pack years: 30.00     Types: Cigarettes    Smokeless tobacco: Never Used   Substance and Sexual Activity    Alcohol use: Yes    Drug use: No    Sexual activity: Yes     Birth control/protection: Surgical     Review of Systems   Constitutional: Positive for chills and fatigue. Negative for fever.   HENT: Negative for sore throat and trouble swallowing.    Eyes: Negative for pain and visual disturbance.   Respiratory: Positive for cough. Negative for shortness of breath.    Cardiovascular: Positive for palpitations. Negative for chest pain.   Gastrointestinal: Positive for abdominal pain, nausea and vomiting.   Genitourinary: Negative for difficulty urinating and dysuria.   Musculoskeletal: Negative for arthralgias and myalgias.   Skin: Negative for rash and wound.   Neurological: Negative for weakness and numbness.     Objective:     Vital Signs (Most Recent):  Temp: 98.3 °F (36.8 °C) (02/10/20 1356)  Pulse: 94 (02/10/20 1443)  Resp: 16 (02/10/20 1343)  BP: (!) 158/69 (02/10/20 1503)  SpO2: 96 % (02/10/20 1343) Vital Signs (24h Range):  Temp:  [98.3 °F (36.8 °C)-98.9 °F (37.2 °C)] 98.3 °F (36.8 °C)  Pulse:  [] 94  Resp:  [16-18] 16  SpO2:  [94 %-99 %] 96 %  BP: (118-188)/(57-93) 158/69     Weight: 24.9 kg (55 lb)  Body mass index is 8.88 kg/m².    Physical Exam  "  Constitutional: She is oriented to person, place, and time. She appears well-developed. No distress.   HENT:   Head: Normocephalic and atraumatic.   Eyes: Conjunctivae are normal. Right eye exhibits no discharge. Left eye exhibits no discharge.   Cardiovascular: Normal rate and intact distal pulses.   Pulmonary/Chest: Effort normal. No respiratory distress.   Abdominal: Soft. There is tenderness (epigastric, RUQ).   Musculoskeletal: Normal range of motion. She exhibits no edema.   Neurological: She is alert and oriented to person, place, and time.   Skin: Skin is warm and dry.   Nursing note and vitals reviewed.    Significant Labs:   CBC:  Recent Labs   Lab 02/10/20  0931   WBC 10.15   HGB 14.2   HCT 45.6      GRAN 51.2  5.2   LYMPH 38.7  3.9   MONO 9.1  0.9   EOS 0.0   BASO 0.04      CMP:  Recent Labs   Lab 02/10/20  0931 02/10/20  1501    137   K 2.7* 3.4*   CL 98 102   CO2 28 26   BUN 7* 7*   CREATININE 0.8 0.7   * 103   CALCIUM 9.3 8.5*   MG 1.8  --    PHOS 2.9  --    ALKPHOS 60  --    AST 94*  --    *  --    BILITOT 0.7  --    PROT 7.5  --    ALBUMIN 4.1  --    ANIONGAP 11 9      Significant Imaging:   No new imaging at time of admission.    Assessment/Plan:     * Intractable vomiting with nausea  - Intractable nausea and vomiting reportedly for "days" with recent admission to [facility].  - Afebrile, WBCs WNL; lower suspicion for acute infectious process, though gastroenteritis possible.  - Associated electrolyte disturbances.  - EKG noted to have QTc 490s, somewhat limiting options for effective nausea control. Repeat EKG in AM.  - Start dicyclomine 20mg IM four times daily, continuing ondansetron 4mg IV q8hr PRN.  - Check U/S Abd Limited to evaluate further.  - GI consult for further evaluation/treatment recommendations.    Transaminitis  - Suspect transaminitis in setting of GI upset; U/S as above.    Hypokalemia  - Replete IV given unable to tolerate PO.    Alcoholic " fatty liver  - h/o, with history of alcohol abuse; patient denies alcohol intake for 2 weeks.  - U/S as above.    Tobacco abuse  - Reports smoking 1/2 PPD since teens.  - Cessation counseling.  - Declines nicotine patch at this time.    Essential hypertension  - Resume amlodipine 10mg PO daily.    VTE Risk Mitigation (From admission, onward)         Ordered     IP VTE LOW RISK PATIENT  Once      02/10/20 1421     Place sequential compression device  Until discontinued      02/10/20 1421                   NATHALIE Andre MD  Department of Hospital Medicine   [facility]

## 2020-02-10 NOTE — ED PROVIDER NOTES
"Encounter Date: 2/10/2020    SCRIBE #1 NOTE: I, Maura Singh, am scribing for, and in the presence of, Dr. Rockwell.       History     Chief Complaint   Patient presents with    Vomiting     pt with c/o vomiting and ataxia . pt was d/c from hospital on saturday and is not any better.      Time seen by provider: 9:03 AM    This is a 59 y.o. female with a history of alcohol abuse who presents with complaint of persistent N/V with associated abdominal pain for one week. She reports having back surgery on December 10 for which she was prescribed narcotic pain medicine.  She subsequently developed a problem with narcotic pain medicine and alcohol.  This led to recent 10-day admission to [facility] for alcohol and pain medication detox. Nausea and vomiting began at the beginning of her detox period and improved once she was started on Suboxone. Nausea and vomiting returned as she was taken off Suboxone.  Nausea and vomiting were treated with Zofran which was ineffective, and then Phenergan. After injection of Phenergan, she reports onset of "akathisia" with uncontrollable shaking, "like a seizure," and hallucinations, and was sent to a medical hospital. She requested to go home two days ago and was discharged home to [place] although N/V had not resolved.  at bedside reports 6-7 episodes of "dry-heaving" since arrival here. She states that her stomach feels "swollen" with "burning" pain in the epigastric region. She rates abdominal pain at a 10/10 severity without any alleviating factors. She confirms associated dizziness, lightheadedness, and generalized weakness. She also notes chills. She reports normal urination this morning. Her last bowel movement was three days ago. She smokes cigarettes. She quit drinking alcohol two weeks ago and has lost 10 pounds since. She does not use illicit drugs. She reports a past surgical history of appendectomy, tubal ligation, and partial hysterectomy. She denies any " fever, rhinorrhea, leg swelling, decrease in urine, or dysuria.    The history is provided by the patient and the spouse.     Review of patient's allergies indicates:   Allergen Reactions    Fentanyl Other (See Comments)     Other reaction(s): Itching    Lortab  [hydrocodone-acetaminophen] Other (See Comments)    Phenothiazines      Past Medical History:   Diagnosis Date    Anemia     Anemia     Depression     Diverticulitis     Fatty liver     GERD (gastroesophageal reflux disease)     Hyperlipidemia     Hypertension     Pancreatitis     Peptic ulcer disease     Polysubstance abuse     Posterior reversible encephalopathy syndrome     Sarcoidosis of lung     Sarcoidosis of lung     over 30 yrs ago    Seizures     7/2017    Suicide attempt     Suicide ideation      Past Surgical History:   Procedure Laterality Date    APPENDECTOMY      COLONOSCOPY N/A 7/28/2017    Procedure: COLONOSCOPY;  Surgeon: Aaron Alvarado MD;  Location: [facility] ENDO;  Service: Endoscopy;  Laterality: N/A;    ESOPHAGOGASTRODUODENOSCOPY  10/7/2016, 11/6/2014 2016 - gastritis, duodenitis, 2014 erosive gastritis    FLEXIBLE SIGMOIDOSCOPY  11/06/2014    colitis    HYSTERECTOMY      INJECTION OF JOINT Right 10/10/2019    Procedure: Injection, Joint RIGHT ILIOPSOAS BURSA/TENDON INJECTION AND RIGHT GLUTEAL TENDON INJECTION WITH STEROID AND LIDOCAINE;  Surgeon: Guillaume Rico MD;  Location: [facility] PAIN MGT;  Service: Pain Management;  Laterality: Right;  NEEDS CONSENT    mediastenoscopy      TONSILLECTOMY N/A 1970    TUBAL LIGATION       Family History   Problem Relation Age of Onset    Heart attack Father     Diabetes Father     Hypertension Father     Diabetes Mother     Hypertension Mother     Breast cancer Maternal Aunt     Colon cancer Maternal Uncle     Breast cancer Daughter     Ovarian cancer Neg Hx      Social History     Tobacco Use    Smoking status: Current Every Day Smoker     Packs/day: 1.00     Years:  "30.00     Pack years: 30.00     Types: Cigarettes    Smokeless tobacco: Never Used   Substance Use Topics    Alcohol use: Yes    Drug use: No     Review of Systems   Constitutional: Positive for chills and unexpected weight change (weight loss). Negative for fever.   HENT: Negative for congestion, rhinorrhea and sore throat.    Respiratory: Negative for cough and shortness of breath.    Cardiovascular: Negative for chest pain and leg swelling.   Gastrointestinal: Positive for abdominal distention (feels "swollen"), abdominal pain, nausea and vomiting. Negative for diarrhea.        Positive for decreased PO tolerance.   Genitourinary: Negative for decreased urine volume, difficulty urinating and dysuria.   Musculoskeletal: Negative for joint swelling.   Skin: Negative for rash and wound.   Neurological: Positive for dizziness, weakness (generalized) and light-headedness.   Psychiatric/Behavioral: Positive for hallucinations (due to detox, resolved). Negative for confusion.       Physical Exam     Initial Vitals [02/10/20 0804]   BP Pulse Resp Temp SpO2   (!) 119/59 100 18 98.9 °F (37.2 °C) (!) 94 %      MAP       --         Physical Exam    Nursing note and vitals reviewed.  Constitutional: She appears well-developed and well-nourished. No distress.   HENT:   Head: Normocephalic and atraumatic.   Mouth/Throat: Oropharynx is clear and moist.   Eyes: Conjunctivae and EOM are normal. Pupils are equal, round, and reactive to light.   Neck: Normal range of motion. Neck supple.   Cardiovascular: Normal rate, regular rhythm and normal heart sounds.   No murmur heard.  Pulmonary/Chest: Breath sounds normal. No respiratory distress. She has no wheezes. She has no rhonchi. She has no rales.   Abdominal: Soft. Bowel sounds are normal. There is tenderness (mild, diffuse). There is no rebound and no guarding.   Obese abdomen.   Musculoskeletal: Normal range of motion.   Neurological: She is alert and oriented to person, place, " and time. She has normal strength. No cranial nerve deficit or sensory deficit. GCS score is 15. GCS eye subscore is 4. GCS verbal subscore is 5. GCS motor subscore is 6.   Skin: Skin is warm and dry. Capillary refill takes less than 2 seconds. No rash noted.   Psychiatric: She has a normal mood and affect. Her behavior is normal.         ED Course   Procedures  Labs Reviewed   CBC W/ AUTO DIFFERENTIAL - Abnormal; Notable for the following components:       Result Value    Mean Corpuscular Hemoglobin Conc 31.1 (*)     RDW 16.4 (*)     All other components within normal limits   COMPREHENSIVE METABOLIC PANEL - Abnormal; Notable for the following components:    Potassium 2.7 (*)     Glucose 118 (*)     BUN, Bld 7 (*)     AST 94 (*)      (*)     All other components within normal limits    Narrative:      K critical result(s) called and verbal readback obtained from Bijal Singh RN by KILEY 02/10/2020 10:09   LIPASE - Abnormal; Notable for the following components:    Lipase 72 (*)     All other components within normal limits   MAGNESIUM   PHOSPHORUS   URINALYSIS, REFLEX TO URINE CULTURE   BASIC METABOLIC PANEL     EKG Readings: (Independently Interpreted)   Sinus rhythm at rate of 78. Sinus arrhythmia present. Non-specific T wave abnormalities present.          Medical Decision Making:   History:   Old Medical Records: I decided to obtain old medical records.  Independently Interpreted Test(s):   I have ordered and independently interpreted EKG Reading(s) - see prior notes  Clinical Tests:   Lab Tests: Ordered and Reviewed  Medical Tests: Ordered and Reviewed  ED Management:  59-year-old female recently discharged from detox unit for alcohol and narcotics abuse and discharge from [facility] in [place] after akisthesias from Phenergan presents with complaint of persistent nausea and vomiting. She has been taking Zofran at home with no relief.  There is associated abdominal pain and generalized weakness.   Vital signs are benign, afebrile.  On exam abdomen is obese without any focal tenderness or rebound or guarding, there is mild diffuse tenderness present.  It is a nonsurgical abdomen and based on this exam I do not suspect acute cholecystitis or acute appendicitis or GI perforation.  Workup reveals mild elevation in LFTs and lipase, normal alkaline phosphatase.  There is hypokalemia present with K 2.7.  Patient was unable to attempt oral potassium repletion and required IV fluids with potassium.  I discussed the case with the hospitalist and she is admitted in stable condition for further electrolyte repletion and care.            Scribe Attestation:   Scribe #1: I performed the above scribed service and the documentation accurately describes the services I performed. I attest to the accuracy of the note.    Attending Attestation:           Physician Attestation for Scribe:  Physician Attestation Statement for Scribe #1: I, Dr. Rockwell, reviewed documentation, as scribed by Maura Singh in my presence, and it is both accurate and complete.                 ED Course as of Feb 10 1203   Mon Feb 10, 2020   1020 [facility] hospitalist.    [SF]   1021 Discussed case with Dr. Izaguirre.    [SF]      ED Course User Index  [SF] Maura Singh                Clinical Impression:     1. Nausea & vomiting    2. Pain of upper abdomen    3. Hypokalemia                                Donna Rockwell MD  02/10/20 4490

## 2020-02-10 NOTE — ASSESSMENT & PLAN NOTE
"- Intractable nausea and vomiting reportedly for "days" with recent admission to [facility].  - Afebrile, WBCs WNL; lower suspicion for acute infectious process, though gastroenteritis possible.  - Associated electrolyte disturbances.  - EKG noted to have QTc 490s, somewhat limiting options for effective nausea control. Repeat EKG in AM.  - Start dicyclomine 20mg IM four times daily, continuing ondansetron 4mg IV q8hr PRN.  - Check U/S Abd Limited to evaluate further.  - GI consult for further evaluation/treatment recommendations.  "

## 2020-02-10 NOTE — ED NOTES
Spoke with Dr. Manning via telephone. He instructs RN to give pt oral potassium at this time and if pt with vomiting, to notify him. Dr. manning also notified of pt's pain at this time, verbal orders to give continue to IV toradol at this time for pain control.

## 2020-02-10 NOTE — NURSING
Report received from Negrita from the ED.  Pt arrived via stretcher accompanied by her . Pt ambulated to bed, heart monitor placed on pt.  PIV with LR infusing @ 75cc/hr.  VS taken, call light in reach, bed in low position,  On room air no distress noted.

## 2020-02-10 NOTE — ED TRIAGE NOTES
Pt presents to ED9.  Pt states she was in drug rehab in [place] for opiods for 2 weeks prior to being admitted to [facility] for Akathisia/Dystonia, dc from [facility] this past Saturday.  She brought medical record and is at bedside.  She has been vomioting for 1 week several times a day without relief from Phenergan or zofran.  Changed into gown, placed on monitor, IV started.

## 2020-02-10 NOTE — ED NOTES
Pt AAOx4 and appropriate at this time. No active vomiting after taking oral potassium. Respirations even and unlabored. No acute distress noted. Awaiting further orders. Pt updated on POC. Bed is locked and in lowest position with side rails up x2. Call bell within reach and pt oriented to use of call bell. Pt remains on continuous cardiac monitoring, continuous pulse ox, and continuous BP cuff. Will continue to monitor. Significant other at BS.

## 2020-02-10 NOTE — ASSESSMENT & PLAN NOTE
- Reports smoking 1/2 PPD since teens.  - Cessation counseling.  - Declines nicotine patch at this time.

## 2020-02-11 ENCOUNTER — ANESTHESIA (OUTPATIENT)
Dept: ENDOSCOPY | Facility: OTHER | Age: 60
DRG: 392 | End: 2020-02-11
Payer: COMMERCIAL

## 2020-02-11 ENCOUNTER — ANESTHESIA EVENT (OUTPATIENT)
Dept: ENDOSCOPY | Facility: OTHER | Age: 60
DRG: 392 | End: 2020-02-11
Payer: COMMERCIAL

## 2020-02-11 LAB
ALBUMIN SERPL BCP-MCNC: 3.3 G/DL (ref 3.5–5.2)
ALP SERPL-CCNC: 49 U/L (ref 55–135)
ALT SERPL W/O P-5'-P-CCNC: 99 U/L (ref 10–44)
ANION GAP SERPL CALC-SCNC: 9 MMOL/L (ref 8–16)
AST SERPL-CCNC: 83 U/L (ref 10–40)
BILIRUB SERPL-MCNC: 0.6 MG/DL (ref 0.1–1)
BUN SERPL-MCNC: 8 MG/DL (ref 8–23)
CALCIUM SERPL-MCNC: 8.4 MG/DL (ref 8.7–10.5)
CHLORIDE SERPL-SCNC: 102 MMOL/L (ref 95–110)
CO2 SERPL-SCNC: 24 MMOL/L (ref 23–29)
CREAT SERPL-MCNC: 0.7 MG/DL (ref 0.5–1.4)
EST. GFR  (AFRICAN AMERICAN): >60 ML/MIN/1.73 M^2
EST. GFR  (NON AFRICAN AMERICAN): >60 ML/MIN/1.73 M^2
GLUCOSE SERPL-MCNC: 89 MG/DL (ref 70–110)
MAGNESIUM SERPL-MCNC: 1.9 MG/DL (ref 1.6–2.6)
PHOSPHATE SERPL-MCNC: 3.3 MG/DL (ref 2.7–4.5)
POTASSIUM SERPL-SCNC: 4.1 MMOL/L (ref 3.5–5.1)
PROT SERPL-MCNC: 6.4 G/DL (ref 6–8.4)
SODIUM SERPL-SCNC: 135 MMOL/L (ref 136–145)

## 2020-02-11 PROCEDURE — 27201012 HC FORCEPS, HOT/COLD, DISP: Performed by: INTERNAL MEDICINE

## 2020-02-11 PROCEDURE — 25000003 PHARM REV CODE 250: Performed by: ANESTHESIOLOGY

## 2020-02-11 PROCEDURE — 63600175 PHARM REV CODE 636 W HCPCS: Performed by: INTERNAL MEDICINE

## 2020-02-11 PROCEDURE — 99233 SBSQ HOSP IP/OBS HIGH 50: CPT | Mod: ,,, | Performed by: INTERNAL MEDICINE

## 2020-02-11 PROCEDURE — 88305 TISSUE EXAM BY PATHOLOGIST: CPT | Mod: 26,,, | Performed by: PATHOLOGY

## 2020-02-11 PROCEDURE — 84100 ASSAY OF PHOSPHORUS: CPT

## 2020-02-11 PROCEDURE — 80053 COMPREHEN METABOLIC PANEL: CPT

## 2020-02-11 PROCEDURE — 36415 COLL VENOUS BLD VENIPUNCTURE: CPT

## 2020-02-11 PROCEDURE — 83735 ASSAY OF MAGNESIUM: CPT

## 2020-02-11 PROCEDURE — 63600175 PHARM REV CODE 636 W HCPCS: Performed by: ANESTHESIOLOGY

## 2020-02-11 PROCEDURE — 37000009 HC ANESTHESIA EA ADD 15 MINS: Performed by: INTERNAL MEDICINE

## 2020-02-11 PROCEDURE — 25000003 PHARM REV CODE 250: Performed by: INTERNAL MEDICINE

## 2020-02-11 PROCEDURE — 11000001 HC ACUTE MED/SURG PRIVATE ROOM

## 2020-02-11 PROCEDURE — 88305 TISSUE EXAM BY PATHOLOGIST: ICD-10-PCS | Mod: 26,,, | Performed by: PATHOLOGY

## 2020-02-11 PROCEDURE — 37000008 HC ANESTHESIA 1ST 15 MINUTES: Performed by: INTERNAL MEDICINE

## 2020-02-11 PROCEDURE — 94761 N-INVAS EAR/PLS OXIMETRY MLT: CPT

## 2020-02-11 PROCEDURE — C9113 INJ PANTOPRAZOLE SODIUM, VIA: HCPCS | Performed by: INTERNAL MEDICINE

## 2020-02-11 PROCEDURE — 99233 PR SUBSEQUENT HOSPITAL CARE,LEVL III: ICD-10-PCS | Mod: ,,, | Performed by: INTERNAL MEDICINE

## 2020-02-11 PROCEDURE — 88305 TISSUE EXAM BY PATHOLOGIST: CPT | Performed by: PATHOLOGY

## 2020-02-11 PROCEDURE — G0378 HOSPITAL OBSERVATION PER HR: HCPCS

## 2020-02-11 PROCEDURE — 63600175 PHARM REV CODE 636 W HCPCS: Performed by: NURSE ANESTHETIST, CERTIFIED REGISTERED

## 2020-02-11 PROCEDURE — 43239 EGD BIOPSY SINGLE/MULTIPLE: CPT | Performed by: INTERNAL MEDICINE

## 2020-02-11 RX ORDER — OXYCODONE HYDROCHLORIDE 5 MG/1
5 TABLET ORAL ONCE
Status: COMPLETED | OUTPATIENT
Start: 2020-02-11 | End: 2020-02-11

## 2020-02-11 RX ORDER — ONDANSETRON 2 MG/ML
4 INJECTION INTRAMUSCULAR; INTRAVENOUS DAILY PRN
Status: DISCONTINUED | OUTPATIENT
Start: 2020-02-11 | End: 2020-02-11

## 2020-02-11 RX ORDER — LIDOCAINE HYDROCHLORIDE 20 MG/ML
INJECTION INTRAVENOUS
Status: DISCONTINUED | OUTPATIENT
Start: 2020-02-11 | End: 2020-02-11

## 2020-02-11 RX ORDER — PROCHLORPERAZINE EDISYLATE 5 MG/ML
5 INJECTION INTRAMUSCULAR; INTRAVENOUS EVERY 6 HOURS PRN
Status: DISCONTINUED | OUTPATIENT
Start: 2020-02-11 | End: 2020-02-13 | Stop reason: HOSPADM

## 2020-02-11 RX ORDER — ONDANSETRON 2 MG/ML
4 INJECTION INTRAMUSCULAR; INTRAVENOUS ONCE
Status: COMPLETED | OUTPATIENT
Start: 2020-02-11 | End: 2020-02-11

## 2020-02-11 RX ORDER — PROPOFOL 10 MG/ML
VIAL (ML) INTRAVENOUS
Status: DISCONTINUED | OUTPATIENT
Start: 2020-02-11 | End: 2020-02-11

## 2020-02-11 RX ORDER — SODIUM CHLORIDE, SODIUM LACTATE, POTASSIUM CHLORIDE, CALCIUM CHLORIDE 600; 310; 30; 20 MG/100ML; MG/100ML; MG/100ML; MG/100ML
INJECTION, SOLUTION INTRAVENOUS CONTINUOUS PRN
Status: DISCONTINUED | OUTPATIENT
Start: 2020-02-11 | End: 2020-02-11

## 2020-02-11 RX ORDER — SODIUM CHLORIDE 0.9 % (FLUSH) 0.9 %
3 SYRINGE (ML) INJECTION
Status: DISCONTINUED | OUTPATIENT
Start: 2020-02-11 | End: 2020-02-13 | Stop reason: HOSPADM

## 2020-02-11 RX ORDER — SUCCINYLCHOLINE CHLORIDE 20 MG/ML
INJECTION INTRAMUSCULAR; INTRAVENOUS
Status: DISCONTINUED | OUTPATIENT
Start: 2020-02-11 | End: 2020-02-11

## 2020-02-11 RX ADMIN — LORAZEPAM 1 MG: 1 TABLET ORAL at 10:02

## 2020-02-11 RX ADMIN — SODIUM CHLORIDE, SODIUM LACTATE, POTASSIUM CHLORIDE, AND CALCIUM CHLORIDE: 600; 310; 30; 20 INJECTION, SOLUTION INTRAVENOUS at 08:02

## 2020-02-11 RX ADMIN — DICYCLOMINE HYDROCHLORIDE 20 MG: 20 INJECTION, SOLUTION INTRAMUSCULAR at 08:02

## 2020-02-11 RX ADMIN — PANTOPRAZOLE SODIUM 40 MG: 40 INJECTION, POWDER, LYOPHILIZED, FOR SOLUTION INTRAVENOUS at 10:02

## 2020-02-11 RX ADMIN — SUCCINYLCHOLINE CHLORIDE 100 MG: 20 INJECTION, SOLUTION INTRAMUSCULAR; INTRAVENOUS at 08:02

## 2020-02-11 RX ADMIN — LORAZEPAM 1.5 MG: 1 TABLET ORAL at 05:02

## 2020-02-11 RX ADMIN — ONDANSETRON 4 MG: 2 INJECTION INTRAMUSCULAR; INTRAVENOUS at 05:02

## 2020-02-11 RX ADMIN — TRAZODONE HYDROCHLORIDE 300 MG: 100 TABLET ORAL at 10:02

## 2020-02-11 RX ADMIN — ONDANSETRON 4 MG: 2 INJECTION INTRAMUSCULAR; INTRAVENOUS at 06:02

## 2020-02-11 RX ADMIN — Medication 6 MG: at 10:02

## 2020-02-11 RX ADMIN — AMLODIPINE BESYLATE 10 MG: 5 TABLET ORAL at 10:02

## 2020-02-11 RX ADMIN — ONDANSETRON 4 MG: 2 INJECTION INTRAMUSCULAR; INTRAVENOUS at 01:02

## 2020-02-11 RX ADMIN — DICYCLOMINE HYDROCHLORIDE 20 MG: 20 INJECTION, SOLUTION INTRAMUSCULAR at 10:02

## 2020-02-11 RX ADMIN — SODIUM CHLORIDE, SODIUM LACTATE, POTASSIUM CHLORIDE, AND CALCIUM CHLORIDE: .6; .31; .03; .02 INJECTION, SOLUTION INTRAVENOUS at 10:02

## 2020-02-11 RX ADMIN — DICYCLOMINE HYDROCHLORIDE 20 MG: 20 INJECTION, SOLUTION INTRAMUSCULAR at 06:02

## 2020-02-11 RX ADMIN — ONDANSETRON 4 MG: 2 INJECTION INTRAMUSCULAR; INTRAVENOUS at 12:02

## 2020-02-11 RX ADMIN — PROPOFOL 180 MG: 10 INJECTION, EMULSION INTRAVENOUS at 08:02

## 2020-02-11 RX ADMIN — OXYCODONE HYDROCHLORIDE 5 MG: 5 TABLET ORAL at 09:02

## 2020-02-11 RX ADMIN — DICYCLOMINE HYDROCHLORIDE 20 MG: 20 INJECTION, SOLUTION INTRAMUSCULAR at 01:02

## 2020-02-11 RX ADMIN — FLUOXETINE 20 MG: 20 CAPSULE ORAL at 08:02

## 2020-02-11 RX ADMIN — LIDOCAINE HYDROCHLORIDE 100 MG: 20 INJECTION, SOLUTION INTRAVENOUS at 09:02

## 2020-02-11 RX ADMIN — ONDANSETRON 4 MG: 2 INJECTION INTRAMUSCULAR; INTRAVENOUS at 09:02

## 2020-02-11 NOTE — HOSPITAL COURSE
Patient hospitalized for intractable nausea and vomiting associated with hypokalemia. She was treated with antiemetics without much improvement. GI was consulted and she had EGD on 2/11 that showed gastritis and a small hiatal hernia. She continued to have intractable nausea until she was given marinol, which helped with nausea. She was started on bland diet and tolerated. She is doing well and stable for discharge home today.

## 2020-02-11 NOTE — ASSESSMENT & PLAN NOTE
- h/o, with history of alcohol abuse; patient denies alcohol intake for 2 weeks.  - U/S shows hepatic steatosis wihtout mass.

## 2020-02-11 NOTE — PROGRESS NOTES
"[facility] Hospital Medicine  Progress Note    Patient Name: Earl Abdul  MRN: [id number]  Patient Class: OP- Observation   Admission Date: 2/10/2020  Length of Stay: 1 days  Attending Physician: Krista Blackman MD  Primary Care Provider: Izzy Garcia MD        Subjective:     Principal Problem:Intractable vomiting with nausea        HPI:  Ms. Abdul is a 59/F with PMH HTN, h/o alcohol abuse with fatty liver disease, chronic back pain s/p surgery with subsequent opiate dependence, transitioned to suboxone with recent detox in [place], recent admission to [facility] in [place], [place] from 02/06 - 02/08 with drug-induced akathisia who presented to ED 02/10 with nausea and vomiting for days. She reports that she was having some abdominal discomfort, nausea and vomiting while still in [place] but that her symptoms have persisted. She reports minimal PO intake and "can't take any pills or even get water down." ED evaluation was notable for K of 2.7; she was unable to tolerate PO potassium repletion. Hospital medicine was subsequently contacted for admission.    Overview/Hospital Course:  Patient hospitalized for intractable nausea and vomiting associated with hypokalemia. She was treated with antiemetics without much improvement. GI was consulted and she had EGD on 2/11 that showed gastritis and a small hiatal hernia.     Interval History:  Pt seen and examined at bedside. She had EGD this morning. Still complains of persistent nausea and abdominal pain despite antiemetics. Vomited this am.    Review of Systems   Constitutional: Negative for chills and fever.   Respiratory: Negative for shortness of breath.    Cardiovascular: Negative for leg swelling.   Gastrointestinal: Positive for abdominal pain, nausea and vomiting.     Objective:     Vital Signs (Most Recent):  Temp: 99 °F (37.2 °C) (02/11/20 1203)  Pulse: 91 (02/11/20 1203)  Resp: 18 (02/11/20 1203)  BP: (!) 144/71 (02/11/20 " "1203)  SpO2: (!) 92 % (02/11/20 1203) Vital Signs (24h Range):  Temp:  [98.2 °F (36.8 °C)-99 °F (37.2 °C)] 99 °F (37.2 °C)  Pulse:  [] 91  Resp:  [15-18] 18  SpO2:  [92 %-100 %] 92 %  BP: (127-188)/(60-85) 144/71     Weight: 77.6 kg (171 lb 1.2 oz)  Body mass index is 27.61 kg/m².    Intake/Output Summary (Last 24 hours) at 2/11/2020 1352  Last data filed at 2/11/2020 0920  Gross per 24 hour   Intake 525 ml   Output --   Net 525 ml      Physical Exam   Constitutional: She is oriented to person, place, and time. She appears well-developed and well-nourished. No distress.   Cardiovascular: Normal rate, regular rhythm and normal heart sounds.   Pulmonary/Chest: Effort normal and breath sounds normal. No respiratory distress.   Abdominal: Soft. Bowel sounds are normal. There is tenderness.   Musculoskeletal: She exhibits no edema.   Neurological: She is alert and oriented to person, place, and time.   Skin: Skin is warm and dry.   Psychiatric: She has a normal mood and affect. Her behavior is normal.   Nursing note and vitals reviewed.      Significant Labs:   BMP:   Recent Labs   Lab 02/11/20  0502   GLU 89   *   K 4.1      CO2 24   BUN 8   CREATININE 0.7   CALCIUM 8.4*   MG 1.9     CBC:   Recent Labs   Lab 02/10/20  0931   WBC 10.15   HGB 14.2   HCT 45.6        All pertinent labs within the past 24 hours have been reviewed.    Significant Imaging: I have reviewed all pertinent imaging results/findings within the past 24 hours.      Assessment/Plan:      * Intractable vomiting with nausea  - Unchanged  - Intractable nausea and vomiting reportedly for "days" with recent admission to [facility].  - Afebrile, WBCs WNL; lower suspicion for acute infectious process, though gastroenteritis possible.  - Associated electrolyte disturbances.  - EKG noted to have QTc 490s, somewhat limiting options for effective nausea control.   - Start dicyclomine 20mg IM four times daily, continuing ondansetron " 4mg IV q8hr. Add compazine with close monitoring of QTc  - u/s without acute findings  - Appreciate GI input. EGD 2/11 with gastritis and hiatal hernia but nothing else to indicate etiology    Transaminitis  - Suspect transaminitis in setting of GI upset; U/S as above  - LFTs down trending    Hypokalemia  - Improved. Monitor closely given continued N/V  - Replace IV PRN.    Alcoholic fatty liver  - h/o, with history of alcohol abuse; patient denies alcohol intake for 2 weeks.  - U/S shows hepatic steatosis wihtout mass.    Tobacco abuse  - Reports smoking 1/2 PPD since teens.  - Cessation counseling.  - Declines nicotine patch at this time.    Essential hypertension  - Continue amlodipine 10mg PO daily.      VTE Risk Mitigation (From admission, onward)         Ordered     IP VTE LOW RISK PATIENT  Once      02/10/20 1421     Place sequential compression device  Until discontinued      02/10/20 1421                      Krista Blackman MD  Department of Hospital Medicine   [facility] [place]

## 2020-02-11 NOTE — SUBJECTIVE & OBJECTIVE
Interval History:  Pt seen and examined at bedside. She had EGD this morning. Still complains of persistent nausea and abdominal pain despite antiemetics. Vomited this am.    Review of Systems   Constitutional: Negative for chills and fever.   Respiratory: Negative for shortness of breath.    Cardiovascular: Negative for leg swelling.   Gastrointestinal: Positive for abdominal pain, nausea and vomiting.     Objective:     Vital Signs (Most Recent):  Temp: 99 °F (37.2 °C) (02/11/20 1203)  Pulse: 91 (02/11/20 1203)  Resp: 18 (02/11/20 1203)  BP: (!) 144/71 (02/11/20 1203)  SpO2: (!) 92 % (02/11/20 1203) Vital Signs (24h Range):  Temp:  [98.2 °F (36.8 °C)-99 °F (37.2 °C)] 99 °F (37.2 °C)  Pulse:  [] 91  Resp:  [15-18] 18  SpO2:  [92 %-100 %] 92 %  BP: (127-188)/(60-85) 144/71     Weight: 77.6 kg (171 lb 1.2 oz)  Body mass index is 27.61 kg/m².    Intake/Output Summary (Last 24 hours) at 2/11/2020 1352  Last data filed at 2/11/2020 0920  Gross per 24 hour   Intake 525 ml   Output --   Net 525 ml      Physical Exam   Constitutional: She is oriented to person, place, and time. She appears well-developed and well-nourished. No distress.   Cardiovascular: Normal rate, regular rhythm and normal heart sounds.   Pulmonary/Chest: Effort normal and breath sounds normal. No respiratory distress.   Abdominal: Soft. Bowel sounds are normal. There is tenderness.   Musculoskeletal: She exhibits no edema.   Neurological: She is alert and oriented to person, place, and time.   Skin: Skin is warm and dry.   Psychiatric: She has a normal mood and affect. Her behavior is normal.   Nursing note and vitals reviewed.      Significant Labs:   BMP:   Recent Labs   Lab 02/11/20  0502   GLU 89   *   K 4.1      CO2 24   BUN 8   CREATININE 0.7   CALCIUM 8.4*   MG 1.9     CBC:   Recent Labs   Lab 02/10/20  0931   WBC 10.15   HGB 14.2   HCT 45.6        All pertinent labs within the past 24 hours have been  reviewed.    Significant Imaging: I have reviewed all pertinent imaging results/findings within the past 24 hours.

## 2020-02-11 NOTE — CONSULTS
Consult Note  Gastroenterology    Consult Requested By: Dr Andre  Reason for Consult: n/v    SUBJECTIVE:     History of Present Illness:  Patient is a 59 y.o. female who presents with n/v, abd pain. Onset of symptoms was gradual starting 1 week ago with unchanged course since that time. Patient denies change in bowel habits, dysphagia and reflux symptoms. Symptoms are aggravated by none. Symptoms improve with none. Past history includes ETOH abuse. Previous studies include ultrasound.     Review of patient's allergies indicates:   Allergen Reactions    Fentanyl Other (See Comments)     Other reaction(s): Itching    Lortab  [hydrocodone-acetaminophen] Other (See Comments)    Phenothiazines        Scheduled Meds:   amLODIPine  10 mg Oral Daily    dicyclomine  20 mg Intramuscular QID    FLUoxetine  20 mg Oral QHS    ondansetron  4 mg Intravenous Q6H    pantoprazole  40 mg Intravenous Daily       Continuous Infusions:   lactated ringers 75 mL/hr at 02/10/20 1138       PRN Meds:.  acetaminophen, hydrALAZINE, ketorolac, LORazepam, melatonin, sodium chloride 0.9%, traZODone    Past Medical History:   Diagnosis Date    Anemia     Anemia     Depression     Diverticulitis     Fatty liver     GERD (gastroesophageal reflux disease)     Hyperlipidemia     Hypertension     Pancreatitis     Peptic ulcer disease     Polysubstance abuse     Posterior reversible encephalopathy syndrome     Sarcoidosis of lung     Sarcoidosis of lung     over 30 yrs ago    Seizures     7/2017    Suicide attempt     Suicide ideation        Past Surgical History:   Procedure Laterality Date    APPENDECTOMY      COLONOSCOPY N/A 7/28/2017    Procedure: COLONOSCOPY;  Surgeon: Aaron Alvarado MD;  Location: [facility];  Service: Endoscopy;  Laterality: N/A;    ESOPHAGOGASTRODUODENOSCOPY  10/7/2016, 11/6/2014    2016 - gastritis, duodenitis, 2014 erosive gastritis    FLEXIBLE SIGMOIDOSCOPY  11/06/2014    colitis    HYSTERECTOMY       INJECTION OF JOINT Right 10/10/2019    Procedure: Injection, Joint RIGHT ILIOPSOAS BURSA/TENDON INJECTION AND RIGHT GLUTEAL TENDON INJECTION WITH STEROID AND LIDOCAINE;  Surgeon: Guillaume Rico MD;  Location: [facility];  Service: Pain Management;  Laterality: Right;  NEEDS CONSENT    mediastenoscopy      TONSILLECTOMY N/A 1970    TUBAL LIGATION         Family History   Problem Relation Age of Onset    Heart attack Father     Diabetes Father     Hypertension Father     Diabetes Mother     Hypertension Mother     Breast cancer Maternal Aunt     Colon cancer Maternal Uncle     Breast cancer Daughter     Ovarian cancer Neg Hx        Social History     Socioeconomic History    Marital status:      Spouse name: Not on file    Number of children: Not on file    Years of education: Not on file    Highest education level: Not on file   Occupational History    Not on file   Social Needs    Financial resource strain: Not on file    Food insecurity:     Worry: Not on file     Inability: Not on file    Transportation needs:     Medical: Not on file     Non-medical: Not on file   Tobacco Use    Smoking status: Current Every Day Smoker     Packs/day: 1.00     Years: 30.00     Pack years: 30.00     Types: Cigarettes    Smokeless tobacco: Never Used   Substance and Sexual Activity    Alcohol use: Yes    Drug use: No    Sexual activity: Yes     Birth control/protection: Surgical   Lifestyle    Physical activity:     Days per week: Not on file     Minutes per session: Not on file    Stress: Not on file   Relationships    Social connections:     Talks on phone: Not on file     Gets together: Not on file     Attends [employer] service: Not on file     Active member of club or organization: Not on file     Attends meetings of clubs or organizations: Not on file     Relationship status: Not on file   Other Topics Concern    Not on file   Social History Narrative    Not on file       Review of  Systems:  Constitutional: no fever or chills  Eyes: no visual changes  ENT: no nasal congestion or sore throat  Respiratory: no cough or shorness of breath  Cardiovascular: no chest pain or palpitations  Gastrointestinal: no change in bowel habits  Genitourinary: no hematuria or dysuria    OBJECTIVE:   Physical Exam:  General: Well developed, well nourished, no apparent distress  Vital Signs Range (Last 24H):  Temp:  [98.2 °F (36.8 °C)-98.9 °F (37.2 °C)]   Pulse:  []   Resp:  [15-18]   BP: (118-188)/(57-93)   SpO2:  [93 %-99 %]   HEENT: Anicteric, PERRLA  CVS: S1, S2, no murmers/rubs  Lungs: CTA-B, normal excursion  Abdomen: Soft, mild tenderness, ND, normal BS's  Extremities: No c/c/e, 1+ DP pulses to BLE's  Skin: No rashes/lesions.      Laboratory:    CBC:   Recent Labs   Lab 02/10/20  0931   WBC 10.15   RBC 5.14   HGB 14.2   HCT 45.6      MCV 89   MCH 27.6   MCHC 31.1*     CMP:   Recent Labs   Lab 02/10/20  0931 02/10/20  1501   * 103   CALCIUM 9.3 8.5*   ALBUMIN 4.1  --    PROT 7.5  --     137   K 2.7* 3.4*   CO2 28 26   CL 98 102   BUN 7* 7*   CREATININE 0.8 0.7   ALKPHOS 60  --    *  --    AST 94*  --    BILITOT 0.7  --      Coagulation: No results for input(s): LABPROT, INR, APTT in the last 168 hours.    Recent Results (from the past 336 hour(s))   CBC auto differential    Collection Time: 02/10/20  9:31 AM   Result Value Ref Range    WBC 10.15 3.90 - 12.70 K/uL    Hemoglobin 14.2 12.0 - 16.0 g/dL    Hematocrit 45.6 37.0 - 48.5 %    Platelets 231 150 - 350 K/uL      No results for input(s): APTT, INR, PTT in the last 168 hours.  Recent Labs   Lab 02/10/20  0931 02/10/20  1501    137   K 2.7* 3.4*   CL 98 102   CO2 28 26   BUN 7* 7*   CREATININE 0.8 0.7   CALCIUM 9.3 8.5*   PROT 7.5  --    BILITOT 0.7  --    ALKPHOS 60  --    *  --    AST 94*  --       Lab Results   Component Value Date    HEPAIGM Negative 07/26/2017    HEPBIGM Positive (A) 07/26/2017    HEPCAB  Negative 07/26/2017    No results found for: AFP   No results found for: CEA       Diagnostic Results:  US: I have personally reviewed the report    ASSESSMENT/PLAN:     1. Nausea & vomiting    2. Pain of upper abdomen    3. Hypokalemia        Plan: 1) Zofran scheduled  2) Ativan scheduled for 24-48 hrs then prn  3) Enmed for breakthrough N/V  4) ? egd if symptoms persist.

## 2020-02-11 NOTE — ASSESSMENT & PLAN NOTE
"- Unchanged  - Intractable nausea and vomiting reportedly for "days" with recent admission to [facility].  - Afebrile, WBCs WNL; lower suspicion for acute infectious process, though gastroenteritis possible.  - Associated electrolyte disturbances.  - EKG noted to have QTc 490s, somewhat limiting options for effective nausea control.   - Start dicyclomine 20mg IM four times daily, continuing ondansetron 4mg IV q8hr. Add compazine with close monitoring of QTc  - u/s without acute findings  - Appreciate GI input. EGD 2/11 with gastritis and hiatal hernia but nothing else to indicate etiology  "

## 2020-02-11 NOTE — ANESTHESIA POSTPROCEDURE EVALUATION
Anesthesia Post Evaluation    Patient: Earl Abdul    Procedure(s) Performed: Procedure(s) (LRB):  ESOPHAGOGASTRODUODENOSCOPY (EGD) (N/A)    Final Anesthesia Type: general    Patient location during evaluation: PACU  Patient participation: Yes- Able to Participate  Level of consciousness: awake and alert  Post-procedure vital signs: reviewed and stable  Pain management: adequate  Airway patency: patent    PONV status at discharge: No PONV  Anesthetic complications: no      Cardiovascular status: blood pressure returned to baseline  Respiratory status: unassisted and spontaneous ventilation  Hydration status: euvolemic  Follow-up not needed.          Vitals Value Taken Time   /73 2/11/2020  9:57 AM   Temp 36.9 °C (98.5 °F) 2/11/2020  9:45 AM   Pulse 85 2/11/2020  9:57 AM   Resp 16 2/11/2020  9:55 AM   SpO2 97 % 2/11/2020  9:57 AM   Vitals shown include unvalidated device data.      Event Time     Out of Recovery 02/11/2020 10:01:01          Pain/Mary Score: Pain Rating Prior to Med Admin: 7 (2/11/2020  9:35 AM)  Pain Rating Post Med Admin: 7 (2/11/2020  9:50 AM)

## 2020-02-11 NOTE — PROVATION PATIENT INSTRUCTIONS
Discharge Summary/Instructions after an Endoscopic Procedure  Patient Name: Earl Abdul  Patient MRN: [id number]  Patient YOB: 1960 Tuesday, February 11, 2020  Fawn Garrido MD  RESTRICTIONS:  During your procedure today, you received medications for sedation.  These   medications may affect your judgment, balance and coordination.  Therefore,   for 24 hours, you have the following restrictions:   - DO NOT drive a car, operate machinery, make legal/financial decisions,   sign important papers or drink alcohol.    ACTIVITY:  Today: no heavy lifting, straining or running due to procedural   sedation/anesthesia.  The following day: return to full activity including work.  DIET:  Eat and drink normally unless instructed otherwise.     TREATMENT FOR COMMON SIDE EFFECTS:  - Mild abdominal pain, nausea, belching, bloating or excessive gas:  rest,   eat lightly and use a heating pad.  - Sore Throat: treat with throat lozenges and/or gargle with warm salt   water.  - Because air was used during the procedure, expelling large amounts of air   from your rectum or belching is normal.  - If a bowel prep was taken, you may not have a bowel movement for 1-3 days.    This is normal.  SYMPTOMS TO WATCH FOR AND REPORT TO YOUR PHYSICIAN:  1. Abdominal pain or bloating, other than gas cramps.  2. Chest pain.  3. Back pain.  4. Signs of infection such as: chills or fever occurring within 24 hours   after the procedure.  5. Rectal bleeding, which would show as bright red, maroon, or black stools.   (A tablespoon of blood from the rectum is not serious, especially if   hemorrhoids are present.)  6. Vomiting.  7. Weakness or dizziness.  GO DIRECTLY TO THE NEAREST EMERGENCY ROOM IF YOU HAVE ANY OF THE FOLLOWING:      Difficulty breathing              Chills and/or fever over 101 F   Persistent vomiting and/or vomiting blood   Severe abdominal pain   Severe chest pain   Black, tarry stools   Bleeding- more than one  tablespoon   Any other symptom or condition that you feel may need urgent attention  Your doctor recommends these additional instructions:  If any biopsies were taken, your doctors clinic will contact you in 1 to 2   weeks with any results.  - Return patient to [facility] for ongoing care.   - NPO today.   - Use Protonix (pantoprazole) 40 mg IV daily.   - Await pathology results.   - Observe patient's clinical course.   - Pain Consult  For questions, problems or results please call your physician - Fawn Garrido MD at Work:  [phone].  [facility], EMERGENCY ROOM PHONE NUMBER: [phone], [employer] [facility]   [phone].  IF A COMPLICATION OR EMERGENCY SITUATION ARISES AND YOU ARE UNABLE TO REACH   YOUR PHYSICIAN - GO DIRECTLY TO THE EMERGENCY ROOM.  Fawn Garrido MD  2/11/2020 9:08:45 AM  This report has been verified and signed electronically.  [employer]

## 2020-02-11 NOTE — ANESTHESIA PROCEDURE NOTES
Intubation  Performed by: Tatiana Hess CRNA  Authorized by: William Black MD     Intubation:     Induction:  Rapid sequence induction    Intubated:  Postinduction    Mask Ventilation:  N/a    Attempts:  1    Attempted By:  CRNA    Method of Intubation:  Video laryngoscopy    Blade:  Wong 3    Laryngeal View Grade: Grade I - full view of chords      Laryngeal View Grade comment:  Per Wong    Difficult Airway Encountered?: No      Complications:  None    Airway Device:  Oral endotracheal tube    Airway Device Size:  7.0    Style/Cuff Inflation:  Cuffed    Tube secured:  21    Secured at:  The lips    Placement Verified By:  Capnometry    Complicating Factors:  None    Findings Post-Intubation:  BS equal bilateral

## 2020-02-11 NOTE — TRANSFER OF CARE
"Anesthesia Transfer of Care Note    Patient: Earl Abdul    Procedure(s) Performed: Procedure(s) (LRB):  ESOPHAGOGASTRODUODENOSCOPY (EGD) (N/A)    Patient location: PACU    Anesthesia Type: general    Transport from OR: Transported from OR on 2-3 L/min O2 by NC with adequate spontaneous ventilation    Post pain: adequate analgesia    Post assessment: no apparent anesthetic complications    Post vital signs: stable    Level of consciousness: awake and alert    Nausea/Vomiting: no nausea/vomiting    Complications: none    Transfer of care protocol was followed      Last vitals:   Visit Vitals  BP (!) 175/79 (BP Location: Right arm, Patient Position: Lying)   Pulse 88   Temp 36.9 °C (98.4 °F) (Oral)   Resp 18   Ht 5' 6" (1.676 m)   Wt 77.6 kg (171 lb 1.2 oz)   SpO2 (!) 93%   Breastfeeding? No   BMI 27.61 kg/m²     "

## 2020-02-12 LAB
ALBUMIN SERPL BCP-MCNC: 3.6 G/DL (ref 3.5–5.2)
ALP SERPL-CCNC: 55 U/L (ref 55–135)
ALT SERPL W/O P-5'-P-CCNC: 113 U/L (ref 10–44)
ANION GAP SERPL CALC-SCNC: 11 MMOL/L (ref 8–16)
AST SERPL-CCNC: 103 U/L (ref 10–40)
BILIRUB SERPL-MCNC: 0.9 MG/DL (ref 0.1–1)
BUN SERPL-MCNC: 9 MG/DL (ref 8–23)
CALCIUM SERPL-MCNC: 9.1 MG/DL (ref 8.7–10.5)
CHLORIDE SERPL-SCNC: 100 MMOL/L (ref 95–110)
CO2 SERPL-SCNC: 25 MMOL/L (ref 23–29)
CREAT SERPL-MCNC: 0.7 MG/DL (ref 0.5–1.4)
EST. GFR  (AFRICAN AMERICAN): >60 ML/MIN/1.73 M^2
EST. GFR  (NON AFRICAN AMERICAN): >60 ML/MIN/1.73 M^2
GLUCOSE SERPL-MCNC: 70 MG/DL (ref 70–110)
MAGNESIUM SERPL-MCNC: 1.7 MG/DL (ref 1.6–2.6)
PHOSPHATE SERPL-MCNC: 3.5 MG/DL (ref 2.7–4.5)
POTASSIUM SERPL-SCNC: 3.1 MMOL/L (ref 3.5–5.1)
PROT SERPL-MCNC: 6.5 G/DL (ref 6–8.4)
SODIUM SERPL-SCNC: 136 MMOL/L (ref 136–145)

## 2020-02-12 PROCEDURE — 99233 SBSQ HOSP IP/OBS HIGH 50: CPT | Mod: ,,, | Performed by: INTERNAL MEDICINE

## 2020-02-12 PROCEDURE — 80053 COMPREHEN METABOLIC PANEL: CPT

## 2020-02-12 PROCEDURE — C9113 INJ PANTOPRAZOLE SODIUM, VIA: HCPCS | Performed by: INTERNAL MEDICINE

## 2020-02-12 PROCEDURE — 63600175 PHARM REV CODE 636 W HCPCS: Performed by: INTERNAL MEDICINE

## 2020-02-12 PROCEDURE — 11000001 HC ACUTE MED/SURG PRIVATE ROOM

## 2020-02-12 PROCEDURE — 36415 COLL VENOUS BLD VENIPUNCTURE: CPT

## 2020-02-12 PROCEDURE — 94761 N-INVAS EAR/PLS OXIMETRY MLT: CPT

## 2020-02-12 PROCEDURE — 99233 PR SUBSEQUENT HOSPITAL CARE,LEVL III: ICD-10-PCS | Mod: ,,, | Performed by: INTERNAL MEDICINE

## 2020-02-12 PROCEDURE — 99900035 HC TECH TIME PER 15 MIN (STAT)

## 2020-02-12 PROCEDURE — 25000003 PHARM REV CODE 250: Performed by: INTERNAL MEDICINE

## 2020-02-12 PROCEDURE — 84100 ASSAY OF PHOSPHORUS: CPT

## 2020-02-12 PROCEDURE — 83735 ASSAY OF MAGNESIUM: CPT

## 2020-02-12 RX ORDER — POTASSIUM CHLORIDE 7.45 MG/ML
10 INJECTION INTRAVENOUS
Status: COMPLETED | OUTPATIENT
Start: 2020-02-12 | End: 2020-02-12

## 2020-02-12 RX ORDER — OXYCODONE HYDROCHLORIDE 5 MG/1
5 TABLET ORAL EVERY 8 HOURS PRN
Status: DISCONTINUED | OUTPATIENT
Start: 2020-02-12 | End: 2020-02-13

## 2020-02-12 RX ORDER — DRONABINOL 2.5 MG/1
5 CAPSULE ORAL 2 TIMES DAILY
Status: DISCONTINUED | OUTPATIENT
Start: 2020-02-12 | End: 2020-02-13 | Stop reason: HOSPADM

## 2020-02-12 RX ADMIN — POTASSIUM CHLORIDE 10 MEQ: 7.46 INJECTION, SOLUTION INTRAVENOUS at 11:02

## 2020-02-12 RX ADMIN — PANTOPRAZOLE SODIUM 40 MG: 40 INJECTION, POWDER, LYOPHILIZED, FOR SOLUTION INTRAVENOUS at 08:02

## 2020-02-12 RX ADMIN — PROCHLORPERAZINE EDISYLATE 5 MG: 5 INJECTION INTRAMUSCULAR; INTRAVENOUS at 09:02

## 2020-02-12 RX ADMIN — LORAZEPAM 1.5 MG: 1 TABLET ORAL at 08:02

## 2020-02-12 RX ADMIN — ONDANSETRON 4 MG: 2 INJECTION INTRAMUSCULAR; INTRAVENOUS at 05:02

## 2020-02-12 RX ADMIN — TRAZODONE HYDROCHLORIDE 300 MG: 100 TABLET ORAL at 09:02

## 2020-02-12 RX ADMIN — ACETAMINOPHEN 650 MG: 325 TABLET ORAL at 08:02

## 2020-02-12 RX ADMIN — DICYCLOMINE HYDROCHLORIDE 20 MG: 20 INJECTION, SOLUTION INTRAMUSCULAR at 08:02

## 2020-02-12 RX ADMIN — POTASSIUM CHLORIDE 10 MEQ: 7.46 INJECTION, SOLUTION INTRAVENOUS at 08:02

## 2020-02-12 RX ADMIN — DICYCLOMINE HYDROCHLORIDE 20 MG: 20 INJECTION, SOLUTION INTRAMUSCULAR at 06:02

## 2020-02-12 RX ADMIN — DRONABINOL 5 MG: 2.5 CAPSULE ORAL at 12:02

## 2020-02-12 RX ADMIN — ONDANSETRON 4 MG: 2 INJECTION INTRAMUSCULAR; INTRAVENOUS at 12:02

## 2020-02-12 RX ADMIN — DICYCLOMINE HYDROCHLORIDE 20 MG: 20 INJECTION, SOLUTION INTRAMUSCULAR at 12:02

## 2020-02-12 RX ADMIN — ONDANSETRON 4 MG: 2 INJECTION INTRAMUSCULAR; INTRAVENOUS at 06:02

## 2020-02-12 RX ADMIN — POTASSIUM CHLORIDE 10 MEQ: 7.46 INJECTION, SOLUTION INTRAVENOUS at 10:02

## 2020-02-12 RX ADMIN — PROCHLORPERAZINE EDISYLATE 5 MG: 5 INJECTION INTRAMUSCULAR; INTRAVENOUS at 08:02

## 2020-02-12 RX ADMIN — FLUOXETINE 20 MG: 20 CAPSULE ORAL at 09:02

## 2020-02-12 RX ADMIN — Medication 6 MG: at 09:02

## 2020-02-12 RX ADMIN — SODIUM CHLORIDE, SODIUM LACTATE, POTASSIUM CHLORIDE, AND CALCIUM CHLORIDE: .6; .31; .03; .02 INJECTION, SOLUTION INTRAVENOUS at 12:02

## 2020-02-12 RX ADMIN — AMLODIPINE BESYLATE 10 MG: 5 TABLET ORAL at 08:02

## 2020-02-12 RX ADMIN — DICYCLOMINE HYDROCHLORIDE 20 MG: 20 INJECTION, SOLUTION INTRAMUSCULAR at 09:02

## 2020-02-12 RX ADMIN — LORAZEPAM 1.5 MG: 1 TABLET ORAL at 06:02

## 2020-02-12 RX ADMIN — DRONABINOL 5 MG: 2.5 CAPSULE ORAL at 09:02

## 2020-02-12 NOTE — PROGRESS NOTES
"[facility] Hospital Medicine  Progress Note    Patient Name: Earl Abdul  MRN: [id number]  Patient Class: OP- Observation   Admission Date: 2/10/2020  Length of Stay: 1 days  Attending Physician: Krista Blackman MD  Primary Care Provider: Izzy Garcia MD        Subjective:     Principal Problem:Intractable vomiting with nausea        HPI:  Ms. Abdul is a 59/F with PMH HTN, h/o alcohol abuse with fatty liver disease, chronic back pain s/p surgery with subsequent opiate dependence, transitioned to suboxone with recent detox in [place], recent admission to [facility] in [place], [place] from 02/06 - 02/08 with drug-induced akathisia who presented to ED 02/10 with nausea and vomiting for days. She reports that she was having some abdominal discomfort, nausea and vomiting while still in [place] but that her symptoms have persisted. She reports minimal PO intake and "can't take any pills or even get water down." ED evaluation was notable for K of 2.7; she was unable to tolerate PO potassium repletion. Hospital medicine was subsequently contacted for admission.    Overview/Hospital Course:  Patient hospitalized for intractable nausea and vomiting associated with hypokalemia. She was treated with antiemetics without much improvement. GI was consulted and she had EGD on 2/11 that showed gastritis and a small hiatal hernia. She continued to have intractable nausea     Interval History:  Pt seen and examined at bedside. No acute events overnight. Still complains of persistent nausea and abdominal pain despite antiemetics.     Review of Systems   Constitutional: Negative for chills and fever.   Respiratory: Negative for shortness of breath.    Cardiovascular: Negative for leg swelling.   Gastrointestinal: Positive for abdominal pain, nausea and vomiting.     Objective:     Vital Signs (Most Recent):  Temp: 98.4 °F (36.9 °C) (02/12/20 0723)  Pulse: 86 (02/12/20 1000)  Resp: 18 (02/12/20 0723)  BP: " "(!) 177/80 (02/12/20 0723)  SpO2: 97 % (02/12/20 0723) Vital Signs (24h Range):  Temp:  [98 °F (36.7 °C)-99.1 °F (37.3 °C)] 98.4 °F (36.9 °C)  Pulse:  [62-92] 86  Resp:  [16-18] 18  SpO2:  [91 %-97 %] 97 %  BP: (115-192)/(55-88) 177/80     Weight: 77 kg (169 lb 12.1 oz)  Body mass index is 27.4 kg/m².    Intake/Output Summary (Last 24 hours) at 2/12/2020 1154  Last data filed at 2/11/2020 1840  Gross per 24 hour   Intake 160 ml   Output --   Net 160 ml      Physical Exam   Constitutional: She is oriented to person, place, and time. She appears well-developed and well-nourished. No distress.   Cardiovascular: Normal rate, regular rhythm and normal heart sounds.   Pulmonary/Chest: Effort normal and breath sounds normal. No respiratory distress.   Abdominal: Soft. Bowel sounds are normal. There is tenderness.   Musculoskeletal: She exhibits no edema.   Neurological: She is alert and oriented to person, place, and time.   Skin: Skin is warm and dry.   Psychiatric: She has a normal mood and affect. Her behavior is normal.   Nursing note and vitals reviewed.      Significant Labs:   BMP:   Recent Labs   Lab 02/12/20  0425   GLU 70      K 3.1*      CO2 25   BUN 9   CREATININE 0.7   CALCIUM 9.1   MG 1.7     CBC:   No results for input(s): WBC, HGB, HCT, PLT in the last 48 hours.  All pertinent labs within the past 24 hours have been reviewed.    Significant Imaging: I have reviewed all pertinent imaging results/findings within the past 24 hours.      Assessment/Plan:      * Intractable vomiting with nausea  - Unchanged  - Intractable nausea and vomiting reportedly for "days" with recent admission to [facility].  - Afebrile, WBCs WNL; lower suspicion for acute infectious process, though gastroenteritis possible.  - Associated electrolyte disturbances.  - EKG noted to have QTc 490s, somewhat limiting options for effective nausea control.   - Continue dicyclomine 20mg IM four times daily, continuing " ondansetron 4mg IV q8hr. Continue compazine with close monitoring of QTc  - u/s without acute findings  - Appreciate GI input. EGD 2/11 with gastritis and hiatal hernia but nothing else to indicate etiology  - Trial of marinol    Transaminitis  - Suspect transaminitis in setting of GI upset; U/S as above  - LFTs stable    Hypokalemia  - Improved. Monitor closely given continued N/V  - Replace IV PRN.    Alcoholic fatty liver  - h/o, with history of alcohol abuse; patient denies alcohol intake for 2 weeks.  - U/S shows hepatic steatosis wihtout mass.    Tobacco abuse  - Reports smoking 1/2 PPD since teens.  - Cessation counseling.  - Declines nicotine patch at this time.    Essential hypertension  - Continue amlodipine 10mg PO daily.      VTE Risk Mitigation (From admission, onward)         Ordered     IP VTE LOW RISK PATIENT  Once      02/10/20 1421     Place sequential compression device  Until discontinued      02/10/20 1421                      Krista Blackman MD  Department of Hospital Medicine   [facility]

## 2020-02-12 NOTE — ASSESSMENT & PLAN NOTE
"- Unchanged  - Intractable nausea and vomiting reportedly for "days" with recent admission to [facility].  - Afebrile, WBCs WNL; lower suspicion for acute infectious process, though gastroenteritis possible.  - Associated electrolyte disturbances.  - EKG noted to have QTc 490s, somewhat limiting options for effective nausea control.   - Continue dicyclomine 20mg IM four times daily, continuing ondansetron 4mg IV q8hr. Continue compazine with close monitoring of QTc  - u/s without acute findings  - Appreciate GI input. EGD 2/11 with gastritis and hiatal hernia but nothing else to indicate etiology  - Trial of marinol  "

## 2020-02-12 NOTE — SUBJECTIVE & OBJECTIVE
Interval History:  Pt seen and examined at bedside. No acute events overnight. Still complains of persistent nausea and abdominal pain despite antiemetics.     Review of Systems   Constitutional: Negative for chills and fever.   Respiratory: Negative for shortness of breath.    Cardiovascular: Negative for leg swelling.   Gastrointestinal: Positive for abdominal pain, nausea and vomiting.     Objective:     Vital Signs (Most Recent):  Temp: 98.4 °F (36.9 °C) (02/12/20 0723)  Pulse: 86 (02/12/20 1000)  Resp: 18 (02/12/20 0723)  BP: (!) 177/80 (02/12/20 0723)  SpO2: 97 % (02/12/20 0723) Vital Signs (24h Range):  Temp:  [98 °F (36.7 °C)-99.1 °F (37.3 °C)] 98.4 °F (36.9 °C)  Pulse:  [62-92] 86  Resp:  [16-18] 18  SpO2:  [91 %-97 %] 97 %  BP: (115-192)/(55-88) 177/80     Weight: 77 kg (169 lb 12.1 oz)  Body mass index is 27.4 kg/m².    Intake/Output Summary (Last 24 hours) at 2/12/2020 1154  Last data filed at 2/11/2020 1840  Gross per 24 hour   Intake 160 ml   Output --   Net 160 ml      Physical Exam   Constitutional: She is oriented to person, place, and time. She appears well-developed and well-nourished. No distress.   Cardiovascular: Normal rate, regular rhythm and normal heart sounds.   Pulmonary/Chest: Effort normal and breath sounds normal. No respiratory distress.   Abdominal: Soft. Bowel sounds are normal. There is tenderness.   Musculoskeletal: She exhibits no edema.   Neurological: She is alert and oriented to person, place, and time.   Skin: Skin is warm and dry.   Psychiatric: She has a normal mood and affect. Her behavior is normal.   Nursing note and vitals reviewed.      Significant Labs:   BMP:   Recent Labs   Lab 02/12/20  0425   GLU 70      K 3.1*      CO2 25   BUN 9   CREATININE 0.7   CALCIUM 9.1   MG 1.7     CBC:   No results for input(s): WBC, HGB, HCT, PLT in the last 48 hours.  All pertinent labs within the past 24 hours have been reviewed.    Significant Imaging: I have reviewed all  pertinent imaging results/findings within the past 24 hours.

## 2020-02-12 NOTE — PLAN OF CARE
SW met with pt at bedside to complete discharge assessment, verified PCP and uses [facility] on [address] and would like bedside delivery.  Pt doesn't have POA but have a LW, didn't bring to hospital.  Spouse will drive pt home.   No needs identified at this time.     02/11/20 2025   Discharge Assessment   Assessment Type Discharge Planning Assessment   Confirmed/corrected address and phone number on facesheet? Yes   Assessment information obtained from? Patient   Communicated expected length of stay with patient/caregiver no   Prior to hospitilization cognitive status: Alert/Oriented   Prior to hospitalization functional status: Independent   Current cognitive status: Alert/Oriented   Current Functional Status: Independent   Lives With spouse   Able to Return to Prior Arrangements yes   Is patient able to care for self after discharge? Unable to determine at this time (comments)   Readmission Within the Last 30 Days no previous admission in last 30 days   Patient currently being followed by outpatient case management? No   Patient currently receives any other outside agency services? No   Equipment Currently Used at Home none   Do you have any problems affording any of your prescribed medications? No   Is the patient taking medications as prescribed? yes   Does the patient have transportation home? Yes   Transportation Anticipated family or friend will provide   Does the patient receive services at the [facility]mad[facility] Clinic? No   Discharge Plan A Home   DME Needed Upon Discharge  none   Patient/Family in Agreement with Plan yes

## 2020-02-12 NOTE — PROGRESS NOTES
Received pt handoff report from Kim in endoscopy around 0940. States pt tolerated procedure well and VS stable on room air. Pt reported pain and nausea, which she states she treated with prn pain and nausea medication. Pt now back in room 316. VS on room air. AAOx4. Denies any needs or acute pain at this time. Telemetry monitor applied to patient. Bed low and locked with side rails up x 2. Will continue to monitor patient.

## 2020-02-12 NOTE — PROGRESS NOTES
Gave patient handoff report to nurse in endoscopy around 0825. Verbalized understanding and denies any further questions regarding patient. Patent IV to L wrist. Pt VS stable on room air. AAOx4. Waiting for patient transport to arrive to bedside.

## 2020-02-12 NOTE — PROGRESS NOTES
Subjective:       Patient ID: Earl Abdul is a 59 y.o. female.    Chief Complaint:  Vomiting (pt with c/o vomiting and ataxia . pt was d/c from hospital on saturday and is not any better. )       History of Present Illness  Pt still with n/v, some what better with ativan/zofran    Review of Systems  Cardiovascular: negative      Objective:     Vitals:    02/12/20 1200 02/12/20 1213 02/12/20 1400 02/12/20 1501   BP:  (!) 175/75  129/63   BP Location:  Left arm  Left arm   Patient Position:  Lying  Lying   Pulse: 96 92 95 82   Resp:       Temp:  99.1 °F (37.3 °C)  99.1 °F (37.3 °C)   TempSrc:  Oral  Oral   SpO2:  (!) 94%  (!) 94%   Weight:       Height:          abd soft mild distension    Data Review   Recent Results (from the past 336 hour(s))   CBC auto differential    Collection Time: 02/10/20  9:31 AM   Result Value Ref Range    WBC 10.15 3.90 - 12.70 K/uL    Hemoglobin 14.2 12.0 - 16.0 g/dL    Hematocrit 45.6 37.0 - 48.5 %    Platelets 231 150 - 350 K/uL      No results for input(s): APTT, INR, PTT in the last 168 hours.  Recent Labs   Lab 02/10/20  0931 02/10/20  1501 02/11/20  0502 02/12/20  0425    137 135* 136   K 2.7* 3.4* 4.1 3.1*   CL 98 102 102 100   CO2 28 26 24 25   BUN 7* 7* 8 9   CREATININE 0.8 0.7 0.7 0.7   CALCIUM 9.3 8.5* 8.4* 9.1   PROT 7.5  --  6.4 6.5   BILITOT 0.7  --  0.6 0.9   ALKPHOS 60  --  49* 55   *  --  99* 113*   AST 94*  --  83* 103*      Lab Results   Component Value Date    HEPAIGM Negative 07/26/2017    HEPBIGM Positive (A) 07/26/2017    HEPCAB Negative 07/26/2017    No results found for: AFP   No results found for: CEA   No results for input(s): APTT, INR, PTT in the last 168 hours.     Assessment:     1. Nausea & vomiting    2. Pain of upper abdomen    3. Hypokalemia    4. Intractable vomiting with nausea        Plan:     1) Continue with current meds  2) If no improvement consider En[facility]

## 2020-02-12 NOTE — PLAN OF CARE
POC reviewed with patient. AAOx4. VS stable on room air. Complaints of pain treated with prn pain medication. Nausea treated with scheduled nausea medication as ordered. No difficulty voiding; up to toilet but no BM today. Telemetry monitor on patient as ordered. Pt went for EGD today; tolerated well. Positions self independently. Instructed to call for help ambulating. No injuries, falls, or trauma occurred during shift. Purposeful rounding completed. Bed low and locked with side rails up x 2 and call light within reach. Will continue to monitor.

## 2020-02-13 VITALS
DIASTOLIC BLOOD PRESSURE: 54 MMHG | BODY MASS INDEX: 27.28 KG/M2 | HEIGHT: 66 IN | OXYGEN SATURATION: 94 % | SYSTOLIC BLOOD PRESSURE: 97 MMHG | WEIGHT: 169.75 LBS | TEMPERATURE: 98 F | RESPIRATION RATE: 16 BRPM | HEART RATE: 73 BPM

## 2020-02-13 PROBLEM — R11.2 INTRACTABLE VOMITING WITH NAUSEA: Status: RESOLVED | Noted: 2020-02-10 | Resolved: 2020-02-13

## 2020-02-13 PROBLEM — E87.6 HYPOKALEMIA: Status: RESOLVED | Noted: 2020-02-10 | Resolved: 2020-02-13

## 2020-02-13 LAB
ALBUMIN SERPL BCP-MCNC: 3.3 G/DL (ref 3.5–5.2)
ANION GAP SERPL CALC-SCNC: 10 MMOL/L (ref 8–16)
BUN SERPL-MCNC: 5 MG/DL (ref 8–23)
CALCIUM SERPL-MCNC: 8.5 MG/DL (ref 8.7–10.5)
CHLORIDE SERPL-SCNC: 104 MMOL/L (ref 95–110)
CO2 SERPL-SCNC: 23 MMOL/L (ref 23–29)
CREAT SERPL-MCNC: 0.7 MG/DL (ref 0.5–1.4)
EST. GFR  (AFRICAN AMERICAN): >60 ML/MIN/1.73 M^2
EST. GFR  (NON AFRICAN AMERICAN): >60 ML/MIN/1.73 M^2
FINAL PATHOLOGIC DIAGNOSIS: NORMAL
GLUCOSE SERPL-MCNC: 96 MG/DL (ref 70–110)
GROSS: NORMAL
MAGNESIUM SERPL-MCNC: 1.7 MG/DL (ref 1.6–2.6)
PHOSPHATE SERPL-MCNC: 3.4 MG/DL (ref 2.7–4.5)
PHOSPHATE SERPL-MCNC: 3.4 MG/DL (ref 2.7–4.5)
POTASSIUM SERPL-SCNC: 3.6 MMOL/L (ref 3.5–5.1)
SODIUM SERPL-SCNC: 137 MMOL/L (ref 136–145)

## 2020-02-13 PROCEDURE — 25000003 PHARM REV CODE 250: Performed by: INTERNAL MEDICINE

## 2020-02-13 PROCEDURE — 63600175 PHARM REV CODE 636 W HCPCS: Performed by: INTERNAL MEDICINE

## 2020-02-13 PROCEDURE — 36415 COLL VENOUS BLD VENIPUNCTURE: CPT

## 2020-02-13 PROCEDURE — 80069 RENAL FUNCTION PANEL: CPT

## 2020-02-13 PROCEDURE — 83735 ASSAY OF MAGNESIUM: CPT

## 2020-02-13 PROCEDURE — 99238 PR HOSPITAL DISCHARGE DAY,<30 MIN: ICD-10-PCS | Mod: ,,, | Performed by: INTERNAL MEDICINE

## 2020-02-13 PROCEDURE — C9113 INJ PANTOPRAZOLE SODIUM, VIA: HCPCS | Performed by: INTERNAL MEDICINE

## 2020-02-13 PROCEDURE — 94761 N-INVAS EAR/PLS OXIMETRY MLT: CPT

## 2020-02-13 PROCEDURE — 99238 HOSP IP/OBS DSCHRG MGMT 30/<: CPT | Mod: ,,, | Performed by: INTERNAL MEDICINE

## 2020-02-13 RX ORDER — OXYCODONE HYDROCHLORIDE 5 MG/1
10 TABLET ORAL EVERY 8 HOURS PRN
Status: DISCONTINUED | OUTPATIENT
Start: 2020-02-13 | End: 2020-02-13 | Stop reason: HOSPADM

## 2020-02-13 RX ORDER — ONDANSETRON 8 MG/1
8 TABLET, ORALLY DISINTEGRATING ORAL 3 TIMES DAILY PRN
Qty: 30 TABLET | Refills: 0 | Status: ON HOLD | OUTPATIENT
Start: 2020-02-13 | End: 2021-04-23 | Stop reason: SDUPTHER

## 2020-02-13 RX ORDER — DICYCLOMINE HYDROCHLORIDE 10 MG/1
10 CAPSULE ORAL 4 TIMES DAILY
Status: DISCONTINUED | OUTPATIENT
Start: 2020-02-13 | End: 2020-02-13 | Stop reason: HOSPADM

## 2020-02-13 RX ORDER — LORAZEPAM 0.5 MG/1
0.5 TABLET ORAL EVERY 6 HOURS PRN
Qty: 5 TABLET | Refills: 0 | Status: SHIPPED | OUTPATIENT
Start: 2020-02-13 | End: 2020-06-03

## 2020-02-13 RX ORDER — DICYCLOMINE HYDROCHLORIDE 10 MG/1
10 CAPSULE ORAL 4 TIMES DAILY
Qty: 40 CAPSULE | Refills: 0 | Status: SHIPPED | OUTPATIENT
Start: 2020-02-13 | End: 2020-02-23

## 2020-02-13 RX ORDER — AMLODIPINE BESYLATE 10 MG/1
10 TABLET ORAL DAILY
Qty: 30 TABLET | Refills: 0 | Status: SHIPPED | OUTPATIENT
Start: 2020-02-14 | End: 2020-06-03

## 2020-02-13 RX ADMIN — DRONABINOL 5 MG: 2.5 CAPSULE ORAL at 09:02

## 2020-02-13 RX ADMIN — AMLODIPINE BESYLATE 10 MG: 5 TABLET ORAL at 09:02

## 2020-02-13 RX ADMIN — DICYCLOMINE HYDROCHLORIDE 20 MG: 20 INJECTION, SOLUTION INTRAMUSCULAR at 09:02

## 2020-02-13 RX ADMIN — PROCHLORPERAZINE EDISYLATE 5 MG: 5 INJECTION INTRAMUSCULAR; INTRAVENOUS at 03:02

## 2020-02-13 RX ADMIN — ONDANSETRON 4 MG: 2 INJECTION INTRAMUSCULAR; INTRAVENOUS at 01:02

## 2020-02-13 RX ADMIN — ONDANSETRON 4 MG: 2 INJECTION INTRAMUSCULAR; INTRAVENOUS at 05:02

## 2020-02-13 RX ADMIN — LORAZEPAM 1.5 MG: 1 TABLET ORAL at 09:02

## 2020-02-13 RX ADMIN — LORAZEPAM 1.5 MG: 1 TABLET ORAL at 03:02

## 2020-02-13 RX ADMIN — DICYCLOMINE HYDROCHLORIDE 10 MG: 10 CAPSULE ORAL at 01:02

## 2020-02-13 RX ADMIN — PANTOPRAZOLE SODIUM 40 MG: 40 INJECTION, POWDER, LYOPHILIZED, FOR SOLUTION INTRAVENOUS at 09:02

## 2020-02-13 NOTE — DISCHARGE SUMMARY
"[facility] Hospital Medicine  Discharge Summary      Patient Name: Earl Abdul  MRN: [id number]  Admission Date: 2/10/2020  Hospital Length of Stay: 2 days  Discharge Date and Time:  02/13/2020 9:54 AM  Attending Physician: Krista Blackman MD   Discharging Provider: Krista Blackman MD  Primary Care Provider: Izzy Garcia MD      HPI:   Ms. Abdul is a 59/F with PMH HTN, h/o alcohol abuse with fatty liver disease, chronic back pain s/p surgery with subsequent opiate dependence, transitioned to suboxone with recent detox in [place], recent admission to [facility] in [place], [place] from 02/06 - 02/08 with drug-induced akathisia who presented to ED 02/10 with nausea and vomiting for days. She reports that she was having some abdominal discomfort, nausea and vomiting while still in [place] but that her symptoms have persisted. She reports minimal PO intake and "can't take any pills or even get water down." ED evaluation was notable for K of 2.7; she was unable to tolerate PO potassium repletion. Hospital medicine was subsequently contacted for admission.    Procedure(s) (LRB):  ESOPHAGOGASTRODUODENOSCOPY (EGD) (N/A)      Hospital Course:   Patient hospitalized for intractable nausea and vomiting associated with hypokalemia. She was treated with antiemetics without much improvement. GI was consulted and she had EGD on 2/11 that showed gastritis and a small hiatal hernia. She continued to have intractable nausea until she was given marinol, which helped with nausea. She was started on bland diet and tolerated. She is doing well and stable for discharge home today.     Consults:   Consults (From admission, onward)        Status Ordering Provider     Inpatient consult to Gastroenterology  Once     Provider:  Fawn Garrido MD    Acknowledged RADHA MALONEY          No new Assessment & Plan notes have been filed under this hospital service since the last note was generated.  Service: " Hospital Medicine    Final Active Diagnoses:    Diagnosis Date Noted POA    Transaminitis [R74.0] 02/10/2020 Yes    Tobacco abuse [Z72.0] 02/07/2014 Yes     Chronic    Essential hypertension [I10] 02/07/2014 Yes     Chronic    Alcoholic fatty liver [K70.0] 05/19/2010 Yes     Chronic      Problems Resolved During this Admission:    Diagnosis Date Noted Date Resolved POA    PRINCIPAL PROBLEM:  Intractable vomiting with nausea [R11.2] 02/10/2020 02/13/2020 Yes    Hypokalemia [E87.6] 02/10/2020 02/13/2020 Yes       Discharged Condition: good    Disposition: Home or Self Care    Follow Up:  Follow-up Information     Izzy Garcia MD In 2 weeks.    Specialty:  Family Medicine  Why:  hospital follow-up  Contact information:  [facility] [facility] [address] [phone]                 Patient Instructions:      Diet Cardiac     Activity as tolerated       Significant Diagnostic Studies: Labs:   BMP:   Recent Labs   Lab 02/12/20  0425 02/13/20  0450   GLU 70 96    137   K 3.1* 3.6    104   CO2 25 23   BUN 9 5*   CREATININE 0.7 0.7   CALCIUM 9.1 8.5*   MG 1.7 1.7   , CBC No results for input(s): WBC, HGB, HCT, PLT in the last 48 hours. and All labs within the past 24 hours have been reviewed    Pending Diagnostic Studies:     Procedure Component Value Units Date/Time    Specimen to Pathology, Surgery Gastrointestinal tract [[id number]] Collected:  02/11/20 0930    Order Status:  Sent Lab Status:  In process Updated:  02/11/20 1033         Medications:  Reconciled Home Medications:      Medication List      START taking these medications    amLODIPine 10 MG tablet  Commonly known as:  NORVASC  Take 1 tablet (10 mg total) by mouth once daily.  Start taking on:  February 14, 2020     dicyclomine 10 MG capsule  Commonly known as:  BENTYL  Take 1 capsule (10 mg total) by mouth 4 (four) times daily. for 10 days        CHANGE how you take these medications    LORazepam 0.5 MG tablet  Commonly known as:   ATIVAN  Take 1 tablet (0.5 mg total) by mouth every 6 (six) hours as needed for Anxiety.  What changed:    · medication strength  · how much to take  · reasons to take this     ondansetron 8 MG Tbdl  Commonly known as:  Zofran ODT  Take 1 tablet (8 mg total) by mouth 3 (three) times daily as needed (for nausea and vomiting).  What changed:  Another medication with the same name was removed. Continue taking this medication, and follow the directions you see here.        CONTINUE taking these medications    acetaminophen 325 MG tablet  Commonly known as:  TYLENOL  Take 2 tablets (650 mg total) by mouth every 6 (six) hours as needed.     FLUoxetine 10 MG capsule  Take 1 capsule (10 mg total) by mouth every evening.     folic acid-vit B6-vit B12 2.5-25-2 mg 2.5-25-2 mg Tab  Commonly known as:  FOLBIC or Equiv  Take 1 tablet by mouth once daily.     levothyroxine 25 MCG tablet  Commonly known as:  Synthroid  Take 1 tablet (25 mcg total) by mouth before breakfast.     omeprazole 10 MG capsule  Commonly known as:  PRILOSEC  Take 10 mg by mouth once daily.     traZODone 100 MG tablet  Commonly known as:  DESYREL  TK 3 TS PO HS        STOP taking these medications    ARIPiprazole 10 MG Tab  Commonly known as:  ABILIFY     diazePAM 5 MG tablet  Commonly known as:  VALIUM     mirtazapine 7.5 MG Tab  Commonly known as:  REMERON            Indwelling Lines/Drains at time of discharge:   Lines/Drains/Airways     None                 Time spent on the discharge of patient: 30 minutes  Patient was seen and examined on the date of discharge and determined to be suitable for discharge.         Krista Blackman MD  Department of Hospital Medicine  [facility]

## 2020-02-13 NOTE — PLAN OF CARE
Assessed pt for spiritual distress - none was reported nor presented.  Pt voiced looking forward to eating  today food for the first time in a while, and getting home again after a long absence (between this hospitalization and the one before).  Pt seemed tired but at peace, and has strong relational support.  Pt/family were appreciative to receive spiritual care and follow-up was offered upon request.

## 2020-02-13 NOTE — PLAN OF CARE
POC reviewed with patient. Pt resting in NAD. VSS on RA. IVF discontinued. Pt tolerating clear liquids. Pt still reporting nausea with no vomiting. Scheduled and PRN antiemetics given. Pt denies pain, but reports restless legs throughout shift. Pt ambulating to toilet independently. No needs expressed at this time. Safety measures maintained. Will continue to monitor.

## 2020-02-13 NOTE — PROGRESS NOTES
Pt tolerated lunch well; one cup of fruit and some rice.  Pt's discharge okayed with MD. SCHULZ removed.  Telemetry box removed this morning and returned  AVS given to and discussed with pt..

## 2020-02-13 NOTE — PLAN OF CARE
Patient will discharge home. Follow up appointment scheduled and added to AVS. Family will provide transportation home. No further needs at discharge.            02/13/20 1206   Final Note   Assessment Type Final Discharge Note   Anticipated Discharge Disposition Home   What phone number can be called within the next 1-3 days to see how you are doing after discharge?   ([phone])   Hospital Follow Up  Appt(s) scheduled? Yes   Discharge plans and expectations educations in teach back method with documentation complete? Yes

## 2020-02-13 NOTE — PLAN OF CARE
POC reviewed with patient. AAOx4. VS stable on room air. Complaints of pain treated with prn pain medication. Complaints of nausea also treated with scheduled and prn nausea medication. Telemetry monitor remains on patient revealing NSR when interpreted. IV fluids infused as ordered; also potassium chloride infused as ordered today, pt tolerated well. No difficulty voiding; up to toilet. Positions self independently. Instructed to call for help ambulating. No injuries, falls, or trauma occurred during shift. Purposeful rounding completed. Bed low and locked with side rails up x 2 and call light within reach. Will continue to monitor.

## 2020-02-14 ENCOUNTER — HOSPITAL ENCOUNTER (EMERGENCY)
Facility: OTHER | Age: 60
Discharge: HOME | End: 2020-02-14
Attending: EMERGENCY MEDICINE
Payer: COMMERCIAL

## 2020-02-14 VITALS
WEIGHT: 155 LBS | RESPIRATION RATE: 20 BRPM | HEART RATE: 69 BPM | HEIGHT: 66 IN | BODY MASS INDEX: 24.91 KG/M2 | DIASTOLIC BLOOD PRESSURE: 56 MMHG | TEMPERATURE: 99 F | OXYGEN SATURATION: 95 % | SYSTOLIC BLOOD PRESSURE: 107 MMHG

## 2020-02-14 DIAGNOSIS — R11.2 NON-INTRACTABLE VOMITING WITH NAUSEA, UNSPECIFIED VOMITING TYPE: Primary | ICD-10-CM

## 2020-02-14 LAB
ALBUMIN SERPL BCP-MCNC: 4 G/DL (ref 3.5–5.2)
ALP SERPL-CCNC: 64 U/L (ref 55–135)
ALT SERPL W/O P-5'-P-CCNC: 68 U/L (ref 10–44)
ANION GAP SERPL CALC-SCNC: 13 MMOL/L (ref 8–16)
AST SERPL-CCNC: 42 U/L (ref 10–40)
BASOPHILS # BLD AUTO: 0.03 K/UL (ref 0–0.2)
BASOPHILS NFR BLD: 0.3 % (ref 0–1.9)
BILIRUB SERPL-MCNC: 0.5 MG/DL (ref 0.1–1)
BUN SERPL-MCNC: 6 MG/DL (ref 8–23)
CALCIUM SERPL-MCNC: 9.1 MG/DL (ref 8.7–10.5)
CHLORIDE SERPL-SCNC: 103 MMOL/L (ref 95–110)
CO2 SERPL-SCNC: 25 MMOL/L (ref 23–29)
CREAT SERPL-MCNC: 0.7 MG/DL (ref 0.5–1.4)
DIFFERENTIAL METHOD: ABNORMAL
EOSINOPHIL # BLD AUTO: 0 K/UL (ref 0–0.5)
EOSINOPHIL NFR BLD: 0.2 % (ref 0–8)
ERYTHROCYTE [DISTWIDTH] IN BLOOD BY AUTOMATED COUNT: 16.5 % (ref 11.5–14.5)
EST. GFR  (AFRICAN AMERICAN): >60 ML/MIN/1.73 M^2
EST. GFR  (NON AFRICAN AMERICAN): >60 ML/MIN/1.73 M^2
GLUCOSE SERPL-MCNC: 118 MG/DL (ref 70–110)
HCT VFR BLD AUTO: 43 % (ref 37–48.5)
HGB BLD-MCNC: 13.5 G/DL (ref 12–16)
IMM GRANULOCYTES # BLD AUTO: 0.03 K/UL (ref 0–0.04)
IMM GRANULOCYTES NFR BLD AUTO: 0.3 % (ref 0–0.5)
LIPASE SERPL-CCNC: 20 U/L (ref 4–60)
LYMPHOCYTES # BLD AUTO: 2.5 K/UL (ref 1–4.8)
LYMPHOCYTES NFR BLD: 28.5 % (ref 18–48)
MAGNESIUM SERPL-MCNC: 1.7 MG/DL (ref 1.6–2.6)
MCH RBC QN AUTO: 28.1 PG (ref 27–31)
MCHC RBC AUTO-ENTMCNC: 31.4 G/DL (ref 32–36)
MCV RBC AUTO: 89 FL (ref 82–98)
MONOCYTES # BLD AUTO: 0.6 K/UL (ref 0.3–1)
MONOCYTES NFR BLD: 7.1 % (ref 4–15)
NEUTROPHILS # BLD AUTO: 5.6 K/UL (ref 1.8–7.7)
NEUTROPHILS NFR BLD: 63.6 % (ref 38–73)
NRBC BLD-RTO: 0 /100 WBC
PLATELET # BLD AUTO: 180 K/UL (ref 150–350)
PMV BLD AUTO: 10.3 FL (ref 9.2–12.9)
POTASSIUM SERPL-SCNC: 3.3 MMOL/L (ref 3.5–5.1)
PROT SERPL-MCNC: 7.5 G/DL (ref 6–8.4)
RBC # BLD AUTO: 4.81 M/UL (ref 4–5.4)
SODIUM SERPL-SCNC: 141 MMOL/L (ref 136–145)
WBC # BLD AUTO: 8.75 K/UL (ref 3.9–12.7)

## 2020-02-14 PROCEDURE — 83690 ASSAY OF LIPASE: CPT

## 2020-02-14 PROCEDURE — 36415 COLL VENOUS BLD VENIPUNCTURE: CPT

## 2020-02-14 PROCEDURE — 96375 TX/PRO/DX INJ NEW DRUG ADDON: CPT

## 2020-02-14 PROCEDURE — 85025 COMPLETE CBC W/AUTO DIFF WBC: CPT

## 2020-02-14 PROCEDURE — 63600175 PHARM REV CODE 636 W HCPCS: Performed by: EMERGENCY MEDICINE

## 2020-02-14 PROCEDURE — 83735 ASSAY OF MAGNESIUM: CPT

## 2020-02-14 PROCEDURE — 99284 EMERGENCY DEPT VISIT MOD MDM: CPT | Mod: 25

## 2020-02-14 PROCEDURE — 96361 HYDRATE IV INFUSION ADD-ON: CPT

## 2020-02-14 PROCEDURE — 80053 COMPREHEN METABOLIC PANEL: CPT

## 2020-02-14 PROCEDURE — 96374 THER/PROPH/DIAG INJ IV PUSH: CPT

## 2020-02-14 RX ORDER — DRONABINOL 2.5 MG/1
5 CAPSULE ORAL
Status: COMPLETED | OUTPATIENT
Start: 2020-02-14 | End: 2020-02-14

## 2020-02-14 RX ORDER — HALOPERIDOL 5 MG/ML
2.5 INJECTION INTRAMUSCULAR
Status: COMPLETED | OUTPATIENT
Start: 2020-02-14 | End: 2020-02-14

## 2020-02-14 RX ORDER — ONDANSETRON 2 MG/ML
8 INJECTION INTRAMUSCULAR; INTRAVENOUS
Status: COMPLETED | OUTPATIENT
Start: 2020-02-14 | End: 2020-02-14

## 2020-02-14 RX ORDER — HALOPERIDOL 2 MG/1
2 TABLET ORAL 2 TIMES DAILY PRN
Qty: 10 TABLET | Refills: 0 | Status: SHIPPED | OUTPATIENT
Start: 2020-02-14 | End: 2020-06-03

## 2020-02-14 RX ORDER — DRONABINOL 2.5 MG/1
2.5 CAPSULE ORAL
Qty: 20 CAPSULE | Refills: 0 | Status: ON HOLD | OUTPATIENT
Start: 2020-02-14 | End: 2021-04-23 | Stop reason: HOSPADM

## 2020-02-14 RX ADMIN — ONDANSETRON 8 MG: 2 INJECTION INTRAMUSCULAR; INTRAVENOUS at 05:02

## 2020-02-14 RX ADMIN — HALOPERIDOL LACTATE 2.5 MG: 5 INJECTION, SOLUTION INTRAMUSCULAR at 06:02

## 2020-02-14 RX ADMIN — DRONABINOL 5 MG: 2.5 CAPSULE ORAL at 06:02

## 2020-02-14 RX ADMIN — SODIUM CHLORIDE 1000 ML: 0.9 INJECTION, SOLUTION INTRAVENOUS at 06:02

## 2020-02-14 NOTE — ED PROVIDER NOTES
"Encounter Date: 2/14/2020    SCRIBE #1 NOTE: I, Margaret Joseph, am scribing for, and in the presence of, Dr. Roberts .       History     Chief Complaint   Patient presents with    Emesis     +Pt reporting N/V since this AM with chills. Pt with recent admission for same s/s, states "They didn't tell me what was wrong with me". Tremors noted to bilateral hands.      Time seen by provider: 5:11 PM    This is a 59 y.o. female with a hx of HTN, Drug and Alcohol abuse, Sarcoidosis who presents with complaint of recurrent vomiting since this morning.  She was just admitted here from 2/10-2/13 with same complaints. She was prescribed Zofran at home however this has not helpful. She also took Ativan to help calm her down and she is unsure whether she was able to keep both medications down. She denies seeing blood in her stool and she does not have diarrhea. She reports associated chills and diffuse abdominal pain. The patient mentions that she has been having abdominal pain since before she was admitted. The only medication that has given her some relief has been Marinol that she was given as inpatient but not discharged with. She has been having body aches but she denies any other flu like symptoms. She has not been using drugs or alcohol. She did see GI Dr. Smith while she was here in the hospital but no outpatient follow-up plan.     The history is provided by the patient.     Review of patient's allergies indicates:   Allergen Reactions    Fentanyl Other (See Comments)     Other reaction(s): Itching    Lortab  [hydrocodone-acetaminophen] Other (See Comments)    Phenothiazines      Past Medical History:   Diagnosis Date    Anemia     Anemia     Depression     Diverticulitis     Fatty liver     GERD (gastroesophageal reflux disease)     Hyperlipidemia     Hypertension     Pancreatitis     Peptic ulcer disease     Polysubstance abuse     Posterior reversible encephalopathy syndrome     Sarcoidosis of lung  "    Sarcoidosis of lung     over 30 yrs ago    Seizures     7/2017    Suicide attempt     Suicide ideation      Past Surgical History:   Procedure Laterality Date    APPENDECTOMY      COLONOSCOPY N/A 7/28/2017    Procedure: COLONOSCOPY;  Surgeon: Aaron Alvarado MD;  Location: [facility], [place], operated by [facility];  Service: Endoscopy;  Laterality: N/A;    ESOPHAGOGASTRODUODENOSCOPY  10/7/2016, 11/6/2014    2016 - gastritis, duodenitis, 2014 erosive gastritis    ESOPHAGOGASTRODUODENOSCOPY N/A 2/11/2020    Procedure: ESOPHAGOGASTRODUODENOSCOPY (EGD);  Surgeon: Fawn Garrido MD;  Location: [facility], [place], operated by [facility];  Service: Endoscopy;  Laterality: N/A;    FLEXIBLE SIGMOIDOSCOPY  11/06/2014    colitis    HYSTERECTOMY      INJECTION OF JOINT Right 10/10/2019    Procedure: Injection, Joint RIGHT ILIOPSOAS BURSA/TENDON INJECTION AND RIGHT GLUTEAL TENDON INJECTION WITH STEROID AND LIDOCAINE;  Surgeon: Guillaume Rico MD;  Location: [facility];  Service: Pain Management;  Laterality: Right;  NEEDS CONSENT    mediastenoscopy      TONSILLECTOMY N/A 1970    TUBAL LIGATION       Family History   Problem Relation Age of Onset    Heart attack Father     Diabetes Father     Hypertension Father     Diabetes Mother     Hypertension Mother     Breast cancer Maternal Aunt     Colon cancer Maternal Uncle     Breast cancer Daughter     Ovarian cancer Neg Hx      Social History     Tobacco Use    Smoking status: Current Every Day Smoker     Packs/day: 1.00     Years: 30.00     Pack years: 30.00     Types: Cigarettes    Smokeless tobacco: Never Used   Substance Use Topics    Alcohol use: Yes    Drug use: No     Review of Systems   Constitutional: Positive for chills and fever.   HENT: Negative for sore throat.    Respiratory: Negative for shortness of breath.    Cardiovascular: Negative for chest pain.   Gastrointestinal: Positive for abdominal pain and vomiting. Negative for blood in stool, diarrhea and nausea.   Genitourinary: Negative for dysuria.    Musculoskeletal: Positive for myalgias. Negative for back pain.   Skin: Negative for color change, rash and wound.   Neurological: Negative for weakness.   Hematological: Does not bruise/bleed easily.   All other systems reviewed and are negative.      Physical Exam     Initial Vitals [02/14/20 1627]   BP Pulse Resp Temp SpO2   (!) 171/78 108 18 99 °F (37.2 °C) 97 %      MAP       --         Physical Exam    Nursing note and vitals reviewed.  Constitutional: She appears well-developed and well-nourished. No distress.   HENT:   Head: Normocephalic and atraumatic.   Right Ear: External ear normal.   Left Ear: External ear normal.   Eyes: Conjunctivae and EOM are normal. Pupils are equal, round, and reactive to light.   Neck: Normal range of motion. Neck supple.   Cardiovascular: Normal rate, regular rhythm and normal heart sounds.   No murmur heard.  Pulmonary/Chest: Breath sounds normal. No respiratory distress. She has no wheezes. She has no rhonchi. She has no rales.   Abdominal: Soft. Bowel sounds are normal. She exhibits no distension. There is tenderness (mild diffuse). There is no rebound and no guarding.   Musculoskeletal: She exhibits no edema.   Neurological: She is alert and oriented to person, place, and time. She has normal strength. No cranial nerve deficit. GCS score is 15. GCS eye subscore is 4. GCS verbal subscore is 5. GCS motor subscore is 6.   Skin: Skin is warm and dry. Capillary refill takes less than 2 seconds.   Psychiatric: She has a normal mood and affect. Her behavior is normal. Judgment and thought content normal.         ED Course   Procedures  Labs Reviewed   COMPREHENSIVE METABOLIC PANEL - Abnormal; Notable for the following components:       Result Value    Potassium 3.3 (*)     Glucose 118 (*)     BUN, Bld 6 (*)     AST 42 (*)     ALT 68 (*)     All other components within normal limits   CBC W/ AUTO DIFFERENTIAL - Abnormal; Notable for the following components:    Mean Corpuscular  Hemoglobin Conc 31.4 (*)     RDW 16.5 (*)     All other components within normal limits   LIPASE   MAGNESIUM          Imaging Results          CT Abdomen Pelvis  Without Contrast (Final result)  Result time 02/14/20 22:18:59    Final result by Mynor Yang MD (02/14/20 22:18:59)                 Impression:      1. No definite acute abnormality.  2. Mild diverticulosis of the sigmoid colon.  3. Nondistention of the colon.  Mild wall thickening of the sigmoid colon is not excluded.  4. Chronic and postoperative changes of the lumbar spine.  5. Stable small probable cyst in the spleen.  6. Mild hepatomegaly.      Electronically signed by: Mynor Yang  Date:    02/14/2020  Time:    22:18             Narrative:    EXAMINATION:  CT ABDOMEN PELVIS WITHOUT CONTRAST    CLINICAL HISTORY:  Abd pain, gastroenteritis or colitis suspected;    TECHNIQUE:  Low dose axial images, sagittal and coronal reformations were obtained from the lung bases to the pubic symphysis.  Oral contrast was not administered.    COMPARISON:  07/20/2017    FINDINGS:  Mild left basilar atelectasis.    No focal abnormality of the liver.  Liver appears mildly enlarged.  Stable small probable cyst inferiorly in the spleen    Pancreas and stomach are within normal limits.    The gallbladder is mildly contracted.  No evidence of cholelithiasis or cholecystitis.  Bile ducts appear within normal limits.    No evidence of aortic aneurysm.  No adenopathy or ascites.    Pancreas is within normal limits.    No focal abnormality of the kidneys bilaterally.  Right kidney is slightly rotated along the horizontal plane with superior pole projecting posteriorly and inferior pole projecting anteriorly.  This may be associated with slight prominence of the liver.  No significant abnormality of the left kidney.  No stone, mass or hydronephrosis bilaterally.    Urinary bladder is within normal limits.    Status post hysterectomy.    No acute osseous abnormality.    No  evidence of bowel obstruction.  Mild diverticulosis of the sigmoid colon.  No evidence of diverticulitis.  Nondistention of the colon.  The lack of IV and bowel contrast diminishes the sensitivity.  Mild wall thickening of the sigmoid colon could not be excluded.    Posterior fusion hardware at L4-5.    Grade 1 spondylolisthesis of L2 on L3.    Severe disc space narrowing in the lumbar spine.    Laminectomy defect at L4-5.                                 Medical Decision Making:   History:   Old Medical Records: I decided to obtain old medical records.  Initial Assessment:       59-year-old female with history of HTN, sarcoidosis, alcohol and drug abuse, presents with recurrent nausea and vomiting that started this morning.  She was admitted on 02/10 for same symptoms, and discharged on 02/13 after she had some improvement with Marinol.  She was discharged with Zofran but not Marinol, and felt okay yesterday but recurrent vomiting and diffuse abdominal pain today.  No blood in emesis, diarrhea, fevers, or other new complaints.   Per chart review, patient had minimal relief with traditional antiemetics.  She had GI consult and EGD while inpatient that showed small hiatal hernia but no other significant abnormalities.  She denies any recent drug or alcohol abuse.  On arrival patient appears in distress and complains of nausea, but no fever or other concerning exam findings.  No focal abdominal tenderness.       Basic labs repeated with mild hypokalemia at 3.3 but no other acute findings, no elevated WBC/lipase.  She had persistent retching after Zofran, while awaiting Marinol ordered from pharmacy.  She was given IV Haldol along with mental since most antiemetics were not effective for her, and on reassessment she was sleeping comfortably.  She was tolerating liquids and solids without difficulty.  Given mild abdominal tenderness and no imaging done on previous admission, CT done with no acute findings.  Patient  comfortable with discharge, but requesting Rx for Marinol and Haldol.  Per my review of [place] board of Pharmacy, anyone with HEATHER can prescribe Marinol, though I am unsure whether most pharmacies will stock it.  Patient given short-term Rx for both mental and Haldol and advised of potential side effects, and will take cautiously.  She will closely follow up with PCP and GI as outpatient, and return for any concerns.      Clinical Tests:   Lab Tests: Ordered and Reviewed            Scribe Attestation:   Scribe #1: I performed the above scribed service and the documentation accurately describes the services I performed. I attest to the accuracy of the note.    Attending Attestation:           Physician Attestation for Scribe:  Physician Attestation Statement for Scribe #1: I, Dr. Roberts, reviewed documentation, as scribed by Margaret Joseph  in my presence, and it is both accurate and complete.                               Clinical Impression:     1. Non-intractable vomiting with nausea, unspecified vomiting type                                Prashant Roberts MD  02/15/20 0044

## 2020-02-15 NOTE — ED NOTES
22g IV inserted, unable to get lab work at this time, pt is difficult stick. Lab called to draw labs from patient. 1800 lab at bedside. DAMARIS

## 2020-02-17 ENCOUNTER — PATIENT OUTREACH (OUTPATIENT)
Dept: ADMINISTRATIVE | Facility: CLINIC | Age: 60
End: 2020-02-17

## 2020-02-17 NOTE — PATIENT INSTRUCTIONS
"  Vomiting (Adult)  Vomiting is a common symptom that may be due to different causes. These include gastroenteritis ("stomach flu"), food poisoning and gastritis. There are other more serious causes of vomiting which may be hard to diagnose early in the illness. Therefore, it is important to watch for the warning signs listed below.  The main danger from repeated vomiting is dehydration. This is due to excess loss of water and minerals from the body. When this occurs, body fluids must be replaced.  Home care  · If symptoms are severe, rest at home for the next 24 hours.  · Because your symptoms may be from an infection, wash your hands frequently and well, and use alcohol-based  to avoid spreading the infection to others.  · Wash your hands for at least 20 seconds. Hum the happy birthday song twice for the correct length of time.  · Wash your hands after using the toilet, before and after preparing food, before eating food, after changing a diaper, cleaning a wound, caring for a sick person, and blowing your nose, coughing, or sneezing. You should also wash your hands after caring for someone who is sick, touching pet food, or treats, and touching an animal, or animal waste.  · You may use acetaminophen or NSAID medicines like ibuprofen or naproxen to control fever, unless another medicine was prescribed. If you have chronic liver or kidney disease or ever had a stomach ulcer or GI bleeding, talk with your doctor before using these medicines. Aspirin should never be used in anyone under 18 years of age who is ill with a fever. It may cause severe liver damage. Don't use NSAID medicines if you are already taking one for another condition (like arthritis) or are on aspirin (such as for heart disease, or after a stroke)  · Avoid tobacco and alcohol use, which may worsen your symptoms.  · If medicines for vomiting were prescribed, take as directed.  · Once vomiting stops, then follow these guidelines:  During " the first 12 to 24 hours follow the diet below:  · Fruit juices. Apple, grape juice, clear fruit drinks, and electrolyte replacement drinks.  · Beverages. Soft drinks without caffeine; mineral water (plain or flavored), decaffeinated tea and coffee.  · Soups. Clear broth, consommé and bouillon  · Desserts. Plain gelatin, popsicles and fruit juice bars. As you feel better, you may add 6-8 ounces of yogurt per day.  During the next 24 hours you may add the following to the above:  · Hot cereal, plain toast, bread, rolls, crackers  · Plain noodles, rice, mashed potatoes, chicken noodle or rice soup  · Unsweetened canned fruit (avoid pineapple), bananas  · Limit caffeine and chocolate. No spices or seasonings except salt.  During the next 24 hours:  Gradually resume a normal diet, as you feel better and your symptoms lessen.  Follow-up care  Follow up with your healthcare provider, or as advised.  When to seek medical advice  Call your healthcare provider right away if any of these occur:  · Constant right-sided lower abdominal pain or increasing general abdominal pain  · Continued vomiting (unable to keep liquids down) for 24 hours  · Frequent diarrhea (more than 5 times a day); blood (red or black color) or mucus in diarrhea  · Reduced urine output or extreme thirst  · Weakness, dizziness or fainting  · Unusually drowsy or confused  · Fever of 100.4°F (38°C) oral or higher, or as directed  · Yellow color of the eyes or skin  Date Last Reviewed: 11/16/2015 © 2000-2017 [employer]. [address] [place] [address], [place] [address]. All rights reserved. This information is not intended as a substitute for professional medical care. Always follow your healthcare professional's instructions.

## 2020-02-17 NOTE — PHYSICIAN QUERY
"PT Name: Earl Abdul  MR #: [id number]     Physician Query Form - Etiology of Condition Clarification      CDS: Lety BREWSTER RN  Contact information: [email or web address]  Phone number: [phone]  This form is a permanent document in the medical record.     Query Date: February 17, 2020    By submitting this query, we are merely seeking further clarification of documentation.  Please utilize your independent clinical judgment when addressing the question(s) below.     The medical record contains the following:    Findings Supporting Clinical Information Location in Medical Record   Intractable vomiting with nausea       - Intractable nausea and vomiting reportedly for "days" with recent admission to [facility].  - Afebrile, WBCs WNL; lower suspicion for acute infectious process, though gastroenteritis possible.  - Associated electrolyte disturbances.  - EKG noted to have QTc 490s, somewhat limiting options for effective nausea control. Repeat EKG in AM.  - Start dicyclomine 20mg IM four times daily, continuing ondansetron 4mg IV q8hr PRN.  - Check U/S Abd Limited to evaluate further.  - GI consult for further evaluation/treatment recommendations.    HPI: Ms. Abdul is a 59/F with PMH HTN, h/o alcohol abuse with fatty liver disease, chronic back pain s/p surgery with subsequent opiate dependence, transitioned to suboxone with recent detox in [place], recent admission to [facility] in [place], [place] from 02/06 - 02/08 with drug-induced akathisia who presented to ED 02/10 with nausea and vomiting for days. She reports that she was having some abdominal discomfort, nausea and vomiting while still in [place] but that her symptoms have persisted.    1. No definite acute abnormality.  2. Mild diverticulosis of the sigmoid colon.    Impression:  - Small hiatal hernia.                       - Gastritis. Biopsied.                       - Normal examined duodenum.    Appreciate GI input. EGD 2/11 with " gastritis and hiatal hernia but nothing else to indicate etiology    - Antral and oxyntic mucosa with mild chronic inactive gastritis.   - No Helicobacter pylori microorganisms identified on H&E stain.   - No intestinal metaplasia or dysplasia.    Patient hospitalized for intractable nausea and vomiting associated with hypokalemia. She was treated with antiemetics without much improvement. GI was consulted and she had EGD on 2/11 that showed gastritis and a small hiatal hernia. She continued to have intractable nausea until she was given marinol, which helped with nausea. She was started on bland diet and tolerated. She is doing well and stable for discharge home today [facility] 2/10/20    [facility] 2/10/20                      [facility] 2/10/20                CT Abd Pelvis 2/14/20    Upper Gi Endoscopy 2/11/20    Progress Notes Hosp Med 2/11/20    Surgical pathology 2/11/20      Disc Summary Hosp Med 2/13/20     Please document your best medical opinion regarding the etiology of _Intractable vomiting with nausea_ for which treatment is/was directed.     Provider use only      [   ] Intractable vomiting with nausea due to gastritis, please further specify acuity            [   ] Acute gastritis            [   ] Chronic gastritis      [   ]  Intractable vomiting with nausea due to other, please specify ________________     [   ] Other __________________       [ x ] Clinically Undetermined     Please document in your progress notes daily for the duration of treatment, until resolved, and include in your discharge summary.

## 2020-04-05 ENCOUNTER — OFFICE VISIT (OUTPATIENT)
Dept: URGENT CARE | Facility: CLINIC | Age: 60
End: 2020-04-05
Payer: COMMERCIAL

## 2020-04-05 ENCOUNTER — CLINICAL SUPPORT (OUTPATIENT)
Dept: URGENT CARE | Facility: CLINIC | Age: 60
End: 2020-04-05
Payer: COMMERCIAL

## 2020-04-05 VITALS
HEART RATE: 75 BPM | WEIGHT: 155 LBS | OXYGEN SATURATION: 90 % | HEIGHT: 66 IN | TEMPERATURE: 98 F | BODY MASS INDEX: 24.91 KG/M2

## 2020-04-05 DIAGNOSIS — R05.9 COUGH: Primary | ICD-10-CM

## 2020-04-05 DIAGNOSIS — R50.9 FEVER, UNSPECIFIED FEVER CAUSE: ICD-10-CM

## 2020-04-05 DIAGNOSIS — R09.02 HYPOXIA: ICD-10-CM

## 2020-04-05 DIAGNOSIS — R05.9 COUGH: ICD-10-CM

## 2020-04-05 DIAGNOSIS — Z20.822 SUSPECTED COVID-19 VIRUS INFECTION: ICD-10-CM

## 2020-04-05 DIAGNOSIS — R06.02 SOB (SHORTNESS OF BREATH): Primary | ICD-10-CM

## 2020-04-05 DIAGNOSIS — R53.1 WEAKNESS: ICD-10-CM

## 2020-04-05 LAB
CTP QC/QA: YES
FLUAV AG NPH QL: NEGATIVE
FLUBV AG NPH QL: NEGATIVE

## 2020-04-05 PROCEDURE — 87804 INFLUENZA ASSAY W/OPTIC: CPT | Mod: QW,S$GLB,, | Performed by: INTERNAL MEDICINE

## 2020-04-05 PROCEDURE — 99215 OFFICE O/P EST HI 40 MIN: CPT | Mod: 25,S$GLB,, | Performed by: INTERNAL MEDICINE

## 2020-04-05 PROCEDURE — U0002 COVID-19 LAB TEST NON-CDC: HCPCS

## 2020-04-05 PROCEDURE — 99000 SPECIMEN HANDLING OFFICE-LAB: CPT | Mod: S$GLB,,, | Performed by: EMERGENCY MEDICINE

## 2020-04-05 PROCEDURE — 71046 X-RAY EXAM CHEST 2 VIEWS: CPT | Mod: FY,S$GLB,, | Performed by: RADIOLOGY

## 2020-04-05 PROCEDURE — 99000 PR SPECIMEN HANDLING,DR OFF->LAB: ICD-10-PCS | Mod: S$GLB,,, | Performed by: EMERGENCY MEDICINE

## 2020-04-05 PROCEDURE — 87804 POCT INFLUENZA A/B: ICD-10-PCS | Mod: 59,QW,S$GLB, | Performed by: INTERNAL MEDICINE

## 2020-04-05 PROCEDURE — 71046 XR CHEST PA AND LATERAL: ICD-10-PCS | Mod: FY,S$GLB,, | Performed by: RADIOLOGY

## 2020-04-05 PROCEDURE — 99215 PR OFFICE/OUTPT VISIT, EST, LEVL V, 40-54 MIN: ICD-10-PCS | Mod: 25,S$GLB,, | Performed by: INTERNAL MEDICINE

## 2020-04-05 RX ORDER — ROPINIROLE 0.25 MG/1
TABLET, FILM COATED ORAL NIGHTLY
Status: ON HOLD | COMMUNITY
Start: 2020-03-19 | End: 2021-04-23 | Stop reason: HOSPADM

## 2020-04-05 RX ORDER — LOSARTAN POTASSIUM 25 MG/1
TABLET ORAL
COMMUNITY
Start: 2020-03-18 | End: 2020-06-03

## 2020-04-05 RX ORDER — DICYCLOMINE HYDROCHLORIDE 20 MG/1
10 TABLET ORAL
COMMUNITY
Start: 2020-02-01 | End: 2020-10-28

## 2020-04-05 RX ORDER — GABAPENTIN 600 MG/1
600 TABLET ORAL 3 TIMES DAILY
Status: ON HOLD | COMMUNITY
Start: 2020-03-02 | End: 2021-04-23 | Stop reason: HOSPADM

## 2020-04-05 RX ORDER — OXYCODONE AND ACETAMINOPHEN 10; 325 MG/1; MG/1
TABLET ORAL
COMMUNITY
Start: 2020-03-31 | End: 2020-06-03

## 2020-04-05 RX ORDER — ARIPIPRAZOLE 2 MG/1
2 TABLET ORAL NIGHTLY
COMMUNITY
Start: 2020-03-19 | End: 2021-02-22 | Stop reason: DRUGHIGH

## 2020-04-05 RX ORDER — CYCLOBENZAPRINE HCL 10 MG
10 TABLET ORAL 3 TIMES DAILY PRN
COMMUNITY
End: 2020-06-03

## 2020-04-05 RX ORDER — HYDROCODONE BITARTRATE AND ACETAMINOPHEN 10; 325 MG/1; MG/1
TABLET ORAL
COMMUNITY
Start: 2020-01-21 | End: 2020-10-28

## 2020-04-05 NOTE — PROGRESS NOTES
"Subjective:       Patient ID: Earl Abdul is a 59 y.o. female.    Vitals:  height is 5' 6" (1.676 m) and weight is 70.3 kg (155 lb). Her tympanic temperature is 98.3 °F (36.8 °C). Her pulse is 75. Her oxygen saturation is 90% (abnormal).     Chief Complaint: URI    Patient claims symptoms began a little over a week ago with a 100.8 fever and now has progressed to coughing, body aches, and weakness.    URI    This is a new problem. The current episode started 1 to 4 weeks ago. The problem has been gradually worsening. The maximum temperature recorded prior to her arrival was 100.4 - 100.9 F. Associated symptoms include congestion and coughing. Pertinent negatives include no abdominal pain, chest pain, diarrhea, dysuria, ear pain, headaches, joint pain, joint swelling, nausea, neck pain, plugged ear sensation, rash, rhinorrhea, sinus pain, sneezing, sore throat, swollen glands, vomiting or wheezing. Associated symptoms comments: Body aches, weakness. She has tried acetaminophen for the symptoms. The treatment provided mild relief.       Constitution: Negative for chills, fatigue and fever.   HENT: Positive for congestion. Negative for ear pain, sinus pain and sore throat.    Neck: Negative for neck pain and painful lymph nodes.   Cardiovascular: Negative for chest pain and leg swelling.   Eyes: Negative for double vision and blurred vision.   Respiratory: Positive for cough. Negative for shortness of breath and wheezing.    Gastrointestinal: Negative for abdominal pain, nausea, vomiting and diarrhea.   Genitourinary: Negative for dysuria, frequency, urgency and history of kidney stones.   Musculoskeletal: Negative for joint pain, joint swelling, muscle cramps and muscle ache.   Skin: Negative for color change, pale, rash and bruising.   Allergic/Immunologic: Negative for seasonal allergies and sneezing.   Neurological: Negative for dizziness, history of vertigo, light-headedness, passing out and headaches. "   Hematologic/Lymphatic: Negative for swollen lymph nodes.   Psychiatric/Behavioral: Negative for nervous/anxious, sleep disturbance and depression. The patient is not nervous/anxious.        Objective:      Physical Exam   Nursing note and vitals reviewed.        Patient was seen remotely due to State of Emergency for the COVID-19 outbreak.  Assessment:       1. SOB (shortness of breath)    2. Cough    3. Fever, unspecified fever cause    4. Weakness    5. Hypoxia    6. Suspected Covid-19 Virus Infection        Plan:         SOB (shortness of breath)  -     XR CHEST PA AND LATERAL; Future; Expected date: 04/05/2020  -     POCT Influenza A/B  -     SARS- CoV-2 (COVID-19) QUALITATIVE PCR; Future; Expected date: 04/05/2020  -     COVID-19 Home Symptom Monitoring  - Duration (days): 14    Cough  -     XR CHEST PA AND LATERAL; Future; Expected date: 04/05/2020  -     POCT Influenza A/B  -     SARS- CoV-2 (COVID-19) QUALITATIVE PCR; Future; Expected date: 04/05/2020  -     COVID-19 Home Symptom Monitoring  - Duration (days): 14    Fever, unspecified fever cause  -     XR CHEST PA AND LATERAL; Future; Expected date: 04/05/2020  -     POCT Influenza A/B  -     SARS- CoV-2 (COVID-19) QUALITATIVE PCR; Future; Expected date: 04/05/2020  -     COVID-19 Home Symptom Monitoring  - Duration (days): 14    Weakness  -     SARS- CoV-2 (COVID-19) QUALITATIVE PCR; Future; Expected date: 04/05/2020  -     COVID-19 Home Symptom Monitoring  - Duration (days): 14    Hypoxia  -     SARS- CoV-2 (COVID-19) QUALITATIVE PCR; Future; Expected date: 04/05/2020  -     COVID-19 Home Symptom Monitoring  - Duration (days): 14    Suspected Covid-19 Virus Infection  -     COVID-19 Home Symptom Monitoring  - Duration (days): 14

## 2020-04-06 ENCOUNTER — TELEPHONE (OUTPATIENT)
Dept: URGENT CARE | Facility: CLINIC | Age: 60
End: 2020-04-06

## 2020-04-06 LAB — SARS-COV-2 RNA RESP QL NAA+PROBE: NOT DETECTED

## 2020-04-06 RX ORDER — BENZONATATE 100 MG/1
100 CAPSULE ORAL 3 TIMES DAILY PRN
Qty: 21 CAPSULE | Refills: 0 | Status: SHIPPED | OUTPATIENT
Start: 2020-04-06 | End: 2020-04-13

## 2020-04-06 RX ORDER — AZITHROMYCIN 250 MG/1
TABLET, FILM COATED ORAL
Qty: 6 TABLET | Refills: 0 | Status: SHIPPED | OUTPATIENT
Start: 2020-04-06 | End: 2020-06-03

## 2020-04-06 RX ORDER — ALBUTEROL SULFATE 90 UG/1
2 AEROSOL, METERED RESPIRATORY (INHALATION) EVERY 4 HOURS PRN
Qty: 18 G | Refills: 0 | Status: SHIPPED | OUTPATIENT
Start: 2020-04-06 | End: 2020-06-03

## 2020-04-06 NOTE — TELEPHONE ENCOUNTER
INFORMED OF PATIENT'S NEGATIVE TEST. STILL FEELS ILL AND MILDLY SOB. STRONGLY ENCOURAGED HER TO GO TO THE ER IF STRUGGLING TO BREATH, WORSENING OF CURRENT CONDITION, AND TO CONTINUE SUPPORTIVE CARE WITH TYLENOL, FLUIDS, AND I WILL ADD ZPACK, ALBUTEROL INHALER, AND TESSALON PERLES RX FOR COUGH.

## 2020-05-05 ENCOUNTER — HOSPITAL ENCOUNTER (OUTPATIENT)
Dept: RADIOLOGY | Facility: OTHER | Age: 60
Discharge: HOME | End: 2020-05-05
Attending: ORTHOPAEDIC SURGERY
Payer: COMMERCIAL

## 2020-05-05 ENCOUNTER — LAB VISIT (OUTPATIENT)
Dept: LAB | Facility: OTHER | Age: 60
End: 2020-05-05
Payer: COMMERCIAL

## 2020-05-05 DIAGNOSIS — M54.50 LUMBAGO: Primary | ICD-10-CM

## 2020-05-05 DIAGNOSIS — M51.26 OTHER INTERVERTEBRAL DISC DISPLACEMENT, LUMBAR REGION: ICD-10-CM

## 2020-05-05 DIAGNOSIS — M51.26 DISPLACEMENT OF LUMBAR INTERVERTEBRAL DISC WITHOUT MYELOPATHY: ICD-10-CM

## 2020-05-05 DIAGNOSIS — M54.50 LOW BACK PAIN: ICD-10-CM

## 2020-05-05 LAB
BUN SERPL-MCNC: 12 MG/DL (ref 8–23)
CREAT SERPL-MCNC: 0.8 MG/DL (ref 0.5–1.4)
EST. GFR  (AFRICAN AMERICAN): >60 ML/MIN/1.73 M^2
EST. GFR  (NON AFRICAN AMERICAN): >60 ML/MIN/1.73 M^2

## 2020-05-05 PROCEDURE — 36415 COLL VENOUS BLD VENIPUNCTURE: CPT

## 2020-05-05 PROCEDURE — 84520 ASSAY OF UREA NITROGEN: CPT

## 2020-05-05 PROCEDURE — 82565 ASSAY OF CREATININE: CPT

## 2020-05-18 ENCOUNTER — HOSPITAL ENCOUNTER (OUTPATIENT)
Dept: RADIOLOGY | Facility: OTHER | Age: 60
Discharge: HOME | End: 2020-05-18
Attending: ORTHOPAEDIC SURGERY
Payer: COMMERCIAL

## 2020-05-18 PROCEDURE — 72158 MRI LUMBAR SPINE W/O & W/DYE: CPT | Mod: 26,,, | Performed by: RADIOLOGY

## 2020-05-18 PROCEDURE — 72158 MRI LUMBAR SPINE W WO CONTRAST: ICD-10-PCS | Mod: 26,,, | Performed by: RADIOLOGY

## 2020-05-18 PROCEDURE — A9585 GADOBUTROL INJECTION: HCPCS | Performed by: ORTHOPAEDIC SURGERY

## 2020-05-18 PROCEDURE — 25500020 PHARM REV CODE 255: Performed by: ORTHOPAEDIC SURGERY

## 2020-05-18 PROCEDURE — 72158 MRI LUMBAR SPINE W/O & W/DYE: CPT | Mod: TC

## 2020-05-18 RX ORDER — GADOBUTROL 604.72 MG/ML
7 INJECTION INTRAVENOUS
Status: COMPLETED | OUTPATIENT
Start: 2020-05-18 | End: 2020-05-18

## 2020-05-18 RX ADMIN — GADOBUTROL 7 ML: 604.72 INJECTION INTRAVENOUS at 11:05

## 2020-05-24 ENCOUNTER — OFFICE VISIT (OUTPATIENT)
Dept: URGENT CARE | Facility: CLINIC | Age: 60
End: 2020-05-24
Payer: COMMERCIAL

## 2020-05-24 VITALS
WEIGHT: 155 LBS | HEIGHT: 66 IN | HEART RATE: 89 BPM | BODY MASS INDEX: 24.91 KG/M2 | SYSTOLIC BLOOD PRESSURE: 133 MMHG | TEMPERATURE: 98 F | DIASTOLIC BLOOD PRESSURE: 64 MMHG | OXYGEN SATURATION: 95 % | RESPIRATION RATE: 20 BRPM

## 2020-05-24 DIAGNOSIS — J41.1 PURULENT BRONCHITIS: ICD-10-CM

## 2020-05-24 DIAGNOSIS — D86.0 PULMONARY SARCOIDOSIS: ICD-10-CM

## 2020-05-24 DIAGNOSIS — R05.9 COUGH: Primary | ICD-10-CM

## 2020-05-24 DIAGNOSIS — R53.83 FATIGUE, UNSPECIFIED TYPE: ICD-10-CM

## 2020-05-24 PROCEDURE — 99214 PR OFFICE/OUTPT VISIT, EST, LEVL IV, 30-39 MIN: ICD-10-PCS | Mod: S$GLB,,, | Performed by: PHYSICIAN ASSISTANT

## 2020-05-24 PROCEDURE — U0003 INFECTIOUS AGENT DETECTION BY NUCLEIC ACID (DNA OR RNA); SEVERE ACUTE RESPIRATORY SYNDROME CORONAVIRUS 2 (SARS-COV-2) (CORONAVIRUS DISEASE [COVID-19]), AMPLIFIED PROBE TECHNIQUE, MAKING USE OF HIGH THROUGHPUT TECHNOLOGIES AS DESCRIBED BY CMS-2020-01-R: HCPCS

## 2020-05-24 PROCEDURE — 71046 X-RAY EXAM CHEST 2 VIEWS: CPT | Mod: S$GLB,,, | Performed by: RADIOLOGY

## 2020-05-24 PROCEDURE — 99214 OFFICE O/P EST MOD 30 MIN: CPT | Mod: S$GLB,,, | Performed by: PHYSICIAN ASSISTANT

## 2020-05-24 PROCEDURE — 71046 XR CHEST PA AND LATERAL: ICD-10-PCS | Mod: S$GLB,,, | Performed by: RADIOLOGY

## 2020-05-24 RX ORDER — GUAIFENESIN 600 MG/1
1200 TABLET, EXTENDED RELEASE ORAL 2 TIMES DAILY
Qty: 28 TABLET | Refills: 0 | Status: SHIPPED | OUTPATIENT
Start: 2020-05-24 | End: 2020-05-31

## 2020-05-24 RX ORDER — DOXYCYCLINE 100 MG/1
100 CAPSULE ORAL 2 TIMES DAILY
Qty: 14 CAPSULE | Refills: 0 | Status: SHIPPED | OUTPATIENT
Start: 2020-05-24 | End: 2020-05-31

## 2020-05-24 RX ORDER — BENZONATATE 200 MG/1
200 CAPSULE ORAL 3 TIMES DAILY PRN
Qty: 30 CAPSULE | Refills: 0 | Status: SHIPPED | OUTPATIENT
Start: 2020-05-24 | End: 2020-06-03

## 2020-05-24 NOTE — PROGRESS NOTES
"Subjective:       Patient ID: Earl Abdul is a 60 y.o. female.    Vitals:  height is 5' 6" (1.676 m) and weight is 70.3 kg (155 lb). Her temperature is 98 °F (36.7 °C). Her blood pressure is 133/64 and her pulse is 89. Her respiration is 20 and oxygen saturation is 95%.     Chief Complaint: Shortness of Breath    Pt with a history of pulmonary sarcoidosis (30+ years) previously in remission presents with cough, fatigue, dyspnea, body aches, headaches for about 3 weeks. She states that at onset of symptoms she had a fever so her pcp told her to go to the ER ([facility]). She was admitted over night for work up because of her history of pulmonary sarcoid. She had negative rapid covid swab in ED. She had CT chest at [facility] at that time and she was told that the CT of the chest showed swollen lymph nodes. I do not have access to these records. She was prescribed prednisone which she has been taking for the past 3 weeks and has also been using home oxygen 2 L. She states that she has been using the O2 regularly at home. She has been trying to get set up with a pulmonologist who she has been referred to through [facility] but has not been able to get an appointment scheduled yet. She is coughing up thick mucus, per pt. She denies chest pain or LE edema/leg pain. Pt has had body aches but denies fever and chills. Symptoms have not worsened, per pt, but have not improved. She is able to eat and drink but still has no energy.     Cough   This is a new problem. The current episode started 1 to 4 weeks ago. Associated symptoms include myalgias and shortness of breath. Pertinent negatives include no chest pain, chills, fever, headaches, hemoptysis, rash, sore throat or wheezing.       Constitution: Positive for fatigue. Negative for chills, sweating, fever, unexpected weight change and generalized weakness.   HENT: Negative for congestion and sore throat.    Neck: Negative for neck pain, neck stiffness and painful lymph nodes. "   Cardiovascular: Negative for chest trauma, chest pain, leg swelling, palpitations and passing out.   Eyes: Negative for double vision and blurred vision.   Respiratory: Positive for chest tightness, cough, sputum production and shortness of breath. Negative for sleep apnea, bloody sputum, COPD, stridor, wheezing and asthma.    Gastrointestinal: Negative for abdominal trauma, abdominal pain, nausea, vomiting and diarrhea.   Genitourinary: Negative for dysuria, frequency, urgency and history of kidney stones.   Musculoskeletal: Positive for muscle ache. Negative for joint pain, joint swelling and muscle cramps.   Skin: Negative for color change, pale, rash and bruising.   Allergic/Immunologic: Negative for seasonal allergies and asthma.   Neurological: Negative for dizziness, history of vertigo, light-headedness, passing out, headaches and loss of consciousness.   Hematologic/Lymphatic: Negative for swollen lymph nodes.   Psychiatric/Behavioral: Negative for nervous/anxious, sleep disturbance and depression. The patient is not nervous/anxious.        Objective:      Physical Exam   Constitutional: She is oriented to person, place, and time. She appears well-developed and well-nourished. She is cooperative.  Non-toxic appearance. She does not have a sickly appearance. She does not appear ill. No distress.   HENT:   Head: Normocephalic and atraumatic.   Right Ear: Hearing, tympanic membrane, external ear and ear canal normal.   Left Ear: Hearing, tympanic membrane, external ear and ear canal normal.   Nose: Nose normal. No mucosal edema, rhinorrhea or nasal deformity. No epistaxis. Right sinus exhibits no maxillary sinus tenderness and no frontal sinus tenderness. Left sinus exhibits no maxillary sinus tenderness and no frontal sinus tenderness.   Mouth/Throat: Uvula is midline, oropharynx is clear and moist and mucous membranes are normal. No trismus in the jaw. Normal dentition. No uvula swelling. No oropharyngeal  exudate, posterior oropharyngeal edema or posterior oropharyngeal erythema.   Eyes: Conjunctivae and lids are normal. Right eye exhibits no discharge. Left eye exhibits no discharge. No scleral icterus.   Neck: Trachea normal, normal range of motion, full passive range of motion without pain and phonation normal. Neck supple. No neck rigidity. No edema and no erythema present.   Cardiovascular: Normal rate, regular rhythm, normal heart sounds, intact distal pulses and normal pulses.   Pulmonary/Chest: Effort normal and breath sounds normal. No accessory muscle usage or stridor. No tachypnea and no bradypnea. No respiratory distress. She has no decreased breath sounds. She has no wheezes. She has no rhonchi. She has no rales.   Pt speaking in complete sentences. No respiratory distress. No cough throughout exam.    Abdominal: Soft. Normal appearance and bowel sounds are normal. She exhibits no distension, no pulsatile midline mass and no mass. There is no tenderness.   Musculoskeletal: Normal range of motion. She exhibits no edema or deformity.   Lymphadenopathy:        Head (right side): No submandibular, no preauricular and no posterior auricular adenopathy present.        Head (left side): No submandibular, no preauricular and no posterior auricular adenopathy present.     She has no cervical adenopathy.   Neurological: She is alert and oriented to person, place, and time. She exhibits normal muscle tone. Coordination normal.   Skin: Skin is warm, dry, intact, not diaphoretic and not pale.   Psychiatric: She has a normal mood and affect. Her speech is normal and behavior is normal. Judgment and thought content normal. Cognition and memory are normal.   Nursing note and vitals reviewed.          Xr Chest Pa And Lateral    Result Date: 5/24/2020  EXAMINATION: XR CHEST PA AND LATERAL CLINICAL HISTORY: . Cough TECHNIQUE: Single frontal portable view of the chest was performed. COMPARISON: 04/05/2020 FINDINGS: Support  devices: None The lungs are clear, with normal appearance of pulmonary vasculature and no pleural effusion or pneumothorax. The cardiac silhouette is normal in size. The hilar and mediastinal contours are unremarkable. Bones are intact.  Aortic atherosclerosis. No significant change     No acute radiographic abnormality. Electronically signed by: Mynor Roldan Date:    05/24/2020 Time:    15:06    On arrival, O2 sat ranging from 90-93% on room air. She states that this has been about what it has been at home without O2 via nasal cannula. The pulse ox was left on the from triage until the end of the exam. When I was in the room it was sitting at 95-96% and would occasionally go to 93 or 94%. She was in acute respiratory distress. Patient has negative CXR today. Given history of present illness and hisory of chronic lung disease, will cover with doxycycline. I have instructed to touch base with PCP and follow up closely. I have also placed urgent referral to pulm through [facility] in hopes that patient can get sooner follow up. ER precautions discussed- instructed to go to ER if she develops any new or worsening symptoms.Patient and her  seem reliable and agree with plan and express understanding.    Assessment:       1. Cough    2. Pulmonary sarcoidosis    3. Fatigue, unspecified type    4. Purulent bronchitis        Plan:         Cough  -     COVID-19 Routine Screening  -     XR CHEST PA AND LATERAL; Future; Expected date: 05/24/2020  -     Ambulatory referral/consult to Pulmonology    Pulmonary sarcoidosis  -     COVID-19 Routine Screening  -     XR CHEST PA AND LATERAL; Future; Expected date: 05/24/2020  -     Ambulatory referral/consult to Pulmonology    Fatigue, unspecified type  -     COVID-19 Routine Screening  -     XR CHEST PA AND LATERAL; Future; Expected date: 05/24/2020  -     Ambulatory referral/consult to Pulmonology    Purulent bronchitis  -     doxycycline (VIBRAMYCIN) 100 MG Cap; Take 1 capsule  (100 mg total) by mouth 2 (two) times daily. for 7 days  Dispense: 14 capsule; Refill: 0  -     guaiFENesin (MUCINEX) 600 mg 12 hr tablet; Take 2 tablets (1,200 mg total) by mouth 2 (two) times daily. for 7 days  Dispense: 28 tablet; Refill: 0  -     ipratropium (ATROVENT HFA) 17 mcg/actuation inhaler; Inhale 2 puffs into the lungs every 6 (six) hours. Rescue for 14 days  Dispense: 1 Inhaler; Refill: 0  -     benzonatate (TESSALON) 200 MG capsule; Take 1 capsule (200 mg total) by mouth 3 (three) times daily as needed for Cough.  Dispense: 30 capsule; Refill: 0      Patient Instructions   - Rest.    - Drink plenty of fluids.      - Tylenol or Ibuprofen as directed as needed for fever/pain. Avoid tylenol if you have a history of liver disease. Do not take ibuprofen if you have a history of GI bleeding, kidney disease, or if you take blood thinners.     - you can take plain Mucinex (guaifenesin) 1200 mg twice a day to help loosen mucous    -warm salt water gargles can help with sore throat    - warm tea with honey can help with cough. Honey is a natural cough suppressant.    - You have been given an antibiotic (doxycycline) to treat your condition today.    - Please complete the antibiotic as directed on the bottle.   - If you are female and on oral birth control pills, use additional methods to prevent pregnancy while on antibiotics and for one cycle after.   - you can take over-the-counter probiotics during and after antibiotic use to help preserve gut gerson and reduce gastrointestinal symptoms    - atrovent is an inhaler to help dry up the mucus in the airways of the lungs. Use as directed, as needed    - Follow up with your PCP or specialty clinic as directed in the next 2-5 days.  I have placed an urgent referral for you to follow up to establish care with pulmonology.  You can call [phone] to schedule an appointment with the appropriate provider.      - Go to the ER if you develop new or worsening  symptoms.     - You must understand that you have received an Urgent Care treatment only and that you may be released before all of your medical problems are known or treated.   - You, the patient, will arrange for follow up care as instructed.   - If your condition worsens or fails to improve we recommend that you receive another evaluation at the ER immediately or contact your PCP to discuss your concerns or return here.

## 2020-05-24 NOTE — PATIENT INSTRUCTIONS
- Rest.    - Drink plenty of fluids.      - Tylenol or Ibuprofen as directed as needed for fever/pain. Avoid tylenol if you have a history of liver disease. Do not take ibuprofen if you have a history of GI bleeding, kidney disease, or if you take blood thinners.     - you can take plain Mucinex (guaifenesin) 1200 mg twice a day to help loosen mucous    -warm salt water gargles can help with sore throat    - warm tea with honey can help with cough. Honey is a natural cough suppressant.    - You have been given an antibiotic (doxycycline) to treat your condition today.    - Please complete the antibiotic as directed on the bottle.   - If you are female and on oral birth control pills, use additional methods to prevent pregnancy while on antibiotics and for one cycle after.   - you can take over-the-counter probiotics during and after antibiotic use to help preserve gut gerson and reduce gastrointestinal symptoms    - atrovent is an inhaler to help dry up the mucus in the airways of the lungs. Use as directed, as needed    - Follow up with your PCP or specialty clinic as directed in the next 2-5 days.  I have placed an urgent referral for you to follow up to establish care with pulmonology.  You can call [phone] to schedule an appointment with the appropriate provider.      - Go to the ER if you develop new or worsening symptoms.     - You must understand that you have received an Urgent Care treatment only and that you may be released before all of your medical problems are known or treated.   - You, the patient, will arrange for follow up care as instructed.   - If your condition worsens or fails to improve we recommend that you receive another evaluation at the ER immediately or contact your PCP to discuss your concerns or return here.

## 2020-05-25 ENCOUNTER — TELEPHONE (OUTPATIENT)
Dept: URGENT CARE | Facility: CLINIC | Age: 60
End: 2020-05-25

## 2020-05-25 DIAGNOSIS — R05.9 COUGH: Primary | ICD-10-CM

## 2020-05-25 LAB — SARS-COV-2 RNA RESP QL NAA+PROBE: NOT DETECTED

## 2020-05-25 RX ORDER — ALBUTEROL SULFATE 90 UG/1
2 AEROSOL, METERED RESPIRATORY (INHALATION) EVERY 6 HOURS PRN
Qty: 18 G | Refills: 0 | Status: SHIPPED | OUTPATIENT
Start: 2020-05-25 | End: 2020-06-03

## 2020-05-25 NOTE — TELEPHONE ENCOUNTER
Patient called stating, she saw RIO Morrison yesterday and he prescribed Ipratropium Inhaler which is not covered by her insurance and cash price is $600.00. Patient reports the Pharm. Suggested Ventolin HFA which is covered by her insurance.

## 2020-05-25 NOTE — TELEPHONE ENCOUNTER
Patient called requesting Ventolin bc ipratropium sent in was too expensive. Patient not allergic. Reports that she is out of previously prescribed albuterol. I explained albuterol and ventolin are the same

## 2020-06-03 ENCOUNTER — OFFICE VISIT (OUTPATIENT)
Dept: SLEEP MEDICINE | Facility: CLINIC | Age: 60
End: 2020-06-03
Payer: COMMERCIAL

## 2020-06-03 VITALS
SYSTOLIC BLOOD PRESSURE: 126 MMHG | HEIGHT: 66 IN | DIASTOLIC BLOOD PRESSURE: 77 MMHG | WEIGHT: 189.81 LBS | BODY MASS INDEX: 30.51 KG/M2 | HEART RATE: 86 BPM

## 2020-06-03 DIAGNOSIS — M79.7 FIBROMYALGIA: ICD-10-CM

## 2020-06-03 DIAGNOSIS — I67.83 POSTERIOR REVERSIBLE ENCEPHALOPATHY SYNDROME: ICD-10-CM

## 2020-06-03 DIAGNOSIS — G47.30 SLEEP APNEA, UNSPECIFIED TYPE: ICD-10-CM

## 2020-06-03 DIAGNOSIS — F51.3 SLEEP WALKING: ICD-10-CM

## 2020-06-03 DIAGNOSIS — F10.20 ALCOHOL DEPENDENCE, CONTINUOUS: Primary | ICD-10-CM

## 2020-06-03 DIAGNOSIS — R06.83 SNORING: ICD-10-CM

## 2020-06-03 DIAGNOSIS — I10 ESSENTIAL HYPERTENSION: ICD-10-CM

## 2020-06-03 DIAGNOSIS — K70.30 ALCOHOLIC CIRRHOSIS OF LIVER WITHOUT ASCITES: ICD-10-CM

## 2020-06-03 DIAGNOSIS — R56.9 NEW ONSET SEIZURE: ICD-10-CM

## 2020-06-03 PROCEDURE — 3078F DIAST BP <80 MM HG: CPT | Mod: CPTII,S$GLB,, | Performed by: INTERNAL MEDICINE

## 2020-06-03 PROCEDURE — 99999 PR PBB SHADOW E&M-EST. PATIENT-LVL III: ICD-10-PCS | Mod: PBBFAC,,, | Performed by: INTERNAL MEDICINE

## 2020-06-03 PROCEDURE — 99203 OFFICE O/P NEW LOW 30 MIN: CPT | Mod: S$GLB,,, | Performed by: INTERNAL MEDICINE

## 2020-06-03 PROCEDURE — 3008F PR BODY MASS INDEX (BMI) DOCUMENTED: ICD-10-PCS | Mod: CPTII,S$GLB,, | Performed by: INTERNAL MEDICINE

## 2020-06-03 PROCEDURE — 3074F PR MOST RECENT SYSTOLIC BLOOD PRESSURE < 130 MM HG: ICD-10-PCS | Mod: CPTII,S$GLB,, | Performed by: INTERNAL MEDICINE

## 2020-06-03 PROCEDURE — 3078F PR MOST RECENT DIASTOLIC BLOOD PRESSURE < 80 MM HG: ICD-10-PCS | Mod: CPTII,S$GLB,, | Performed by: INTERNAL MEDICINE

## 2020-06-03 PROCEDURE — 99203 PR OFFICE/OUTPT VISIT, NEW, LEVL III, 30-44 MIN: ICD-10-PCS | Mod: S$GLB,,, | Performed by: INTERNAL MEDICINE

## 2020-06-03 PROCEDURE — 3008F BODY MASS INDEX DOCD: CPT | Mod: CPTII,S$GLB,, | Performed by: INTERNAL MEDICINE

## 2020-06-03 PROCEDURE — 99999 PR PBB SHADOW E&M-EST. PATIENT-LVL III: CPT | Mod: PBBFAC,,, | Performed by: INTERNAL MEDICINE

## 2020-06-03 PROCEDURE — 3074F SYST BP LT 130 MM HG: CPT | Mod: CPTII,S$GLB,, | Performed by: INTERNAL MEDICINE

## 2020-06-03 RX ORDER — PREDNISONE 10 MG/1
TABLET ORAL
COMMUNITY
Start: 2020-04-30 | End: 2020-10-28

## 2020-06-03 NOTE — PATIENT INSTRUCTIONS
Obstructive Sleep Apnea  Obstructive sleep apnea is a condition that causes your air passages to become narrowed or blocked during sleep. As a result, breathing stops for short periods. Your body wakes up enough for breathing to begin again, though you don't remember it. The cycle of stopped breathing and brief awakenings can repeat dozens of times a night. This prevents the body from getting to the deeper stages of sleep that are needed for good rest and may cause your body's oxygen level to fall.  Signs of sleep apnea include loud snoring, noisy breathing, and gasping sounds during sleep. Daytime symptoms include waking up tired after a full night's sleep, waking up with headaches, feeling very sleepy or falling asleep during the day, and having problems with memory or concentration.  Risk factors for sleep apnea include:  · Being overweight  · Being a man, or a woman in menopause  · Smoking  · Using alcohol or sedating medicines  · Having enlarged structures in the nose or throat  Home care  Lifestyle changes that can help treat snoring and sleep apnea include the following:  · If you are overweight, lose weight. Talk to your healthcare provider about a weight-loss plan for you.  · Avoid alcohol for 3 to 4 hours before bedtime. Avoid sedating medications. Ask your healthcare provider about the medicines you take.  · If you smoke, talk to your healthcare provider about ways to quit.  · Sleep on your side. This can help prevent gravity from pulling relaxed throat tissues into your breathing passages.  · If you have allergies or sinus problems that block your nose, ask your healthcare provider for help.  Follow-up care  Follow up with your healthcare provider, or as advised. A diagnosis of sleep apnea is made with a sleep study. Your healthcare provider can tell you more about this test.  When to seek medical advice  Sleep apnea can make you more likely to have certain health problems. These include high blood  pressure, heart attack, stroke, and sexual dysfunction. If you have sleep apnea, talk to your healthcare provider about the best treatments for you.  Date Last Reviewed: 4/1/2017  © 6950-7958 [employer]. [address] [place] [address], [place] [address]. All rights reserved. This information is not intended as a substitute for professional medical care. Always follow your healthcare professional's instructions.

## 2020-06-03 NOTE — LETTER
Sharita 3, 2020      Izzy Garcia MD  [address]  [facility] [address]           [id number]  [address]  [facility] [address]  Phone: [phone]          Patient: Earl Abdul   MR Number: [id number]   YOB: 1960   Date of Visit: 6/3/2020       Dear Dr. Izzy Garcia:    Thank you for referring Earl Abdul to me for evaluation. Attached you will find relevant portions of my assessment and plan of care.    If you have questions, please do not hesitate to call me. I look forward to following Earl Abdul along with you.    Sincerely,    Baylee Gregorio MD    Enclosure  CC:  No Recipients    If you would like to receive this communication electronically, please contact [email or web address] or [phone] to request more information on [employer] access.    For providers and/or their staff who would like to refer a patient to [facility], please contact us through our one-stop-shop provider referral line, [facility], at [phone].    If you feel you have received this communication in error or would no longer like to receive these types of communications, please e-mail [email or web address]

## 2020-06-16 ENCOUNTER — HOSPITAL ENCOUNTER (EMERGENCY)
Facility: OTHER | Age: 60
Discharge: HOME | End: 2020-06-16
Attending: EMERGENCY MEDICINE
Payer: COMMERCIAL

## 2020-06-16 VITALS
HEART RATE: 103 BPM | HEIGHT: 66 IN | DIASTOLIC BLOOD PRESSURE: 75 MMHG | BODY MASS INDEX: 30.53 KG/M2 | SYSTOLIC BLOOD PRESSURE: 129 MMHG | OXYGEN SATURATION: 99 % | TEMPERATURE: 98 F | RESPIRATION RATE: 16 BRPM | WEIGHT: 190 LBS

## 2020-06-16 DIAGNOSIS — J44.1 COPD EXACERBATION: ICD-10-CM

## 2020-06-16 DIAGNOSIS — F10.10 ALCOHOL ABUSE: ICD-10-CM

## 2020-06-16 DIAGNOSIS — R53.1 WEAKNESS: Primary | ICD-10-CM

## 2020-06-16 LAB
ALBUMIN SERPL BCP-MCNC: 4.6 G/DL (ref 3.5–5.2)
ALP SERPL-CCNC: 84 U/L (ref 55–135)
ALT SERPL W/O P-5'-P-CCNC: 79 U/L (ref 10–44)
ANION GAP SERPL CALC-SCNC: 19 MMOL/L (ref 8–16)
AST SERPL-CCNC: 177 U/L (ref 10–40)
BASOPHILS # BLD AUTO: 0.02 K/UL (ref 0–0.2)
BASOPHILS NFR BLD: 0.3 % (ref 0–1.9)
BILIRUB SERPL-MCNC: 1.1 MG/DL (ref 0.1–1)
BUN SERPL-MCNC: 10 MG/DL (ref 8–23)
CALCIUM SERPL-MCNC: 9 MG/DL (ref 8.7–10.5)
CHLORIDE SERPL-SCNC: 101 MMOL/L (ref 95–110)
CO2 SERPL-SCNC: 24 MMOL/L (ref 23–29)
CREAT SERPL-MCNC: 0.9 MG/DL (ref 0.5–1.4)
DIFFERENTIAL METHOD: ABNORMAL
EOSINOPHIL # BLD AUTO: 0 K/UL (ref 0–0.5)
EOSINOPHIL NFR BLD: 0.1 % (ref 0–8)
ERYTHROCYTE [DISTWIDTH] IN BLOOD BY AUTOMATED COUNT: 19.4 % (ref 11.5–14.5)
EST. GFR  (AFRICAN AMERICAN): >60 ML/MIN/1.73 M^2
EST. GFR  (NON AFRICAN AMERICAN): >60 ML/MIN/1.73 M^2
GLUCOSE SERPL-MCNC: 81 MG/DL (ref 70–110)
HCT VFR BLD AUTO: 40.7 % (ref 37–48.5)
HGB BLD-MCNC: 12.6 G/DL (ref 12–16)
IMM GRANULOCYTES # BLD AUTO: 0.05 K/UL (ref 0–0.04)
IMM GRANULOCYTES NFR BLD AUTO: 0.7 % (ref 0–0.5)
LYMPHOCYTES # BLD AUTO: 4.1 K/UL (ref 1–4.8)
LYMPHOCYTES NFR BLD: 54.7 % (ref 18–48)
MAGNESIUM SERPL-MCNC: 1.9 MG/DL (ref 1.6–2.6)
MCH RBC QN AUTO: 28.5 PG (ref 27–31)
MCHC RBC AUTO-ENTMCNC: 31 G/DL (ref 32–36)
MCV RBC AUTO: 92 FL (ref 82–98)
MONOCYTES # BLD AUTO: 0.5 K/UL (ref 0.3–1)
MONOCYTES NFR BLD: 7 % (ref 4–15)
NEUTROPHILS # BLD AUTO: 2.8 K/UL (ref 1.8–7.7)
NEUTROPHILS NFR BLD: 37.2 % (ref 38–73)
NRBC BLD-RTO: 0 /100 WBC
PLATELET # BLD AUTO: 88 K/UL (ref 150–350)
PMV BLD AUTO: 9.4 FL (ref 9.2–12.9)
POTASSIUM SERPL-SCNC: 3.6 MMOL/L (ref 3.5–5.1)
PROT SERPL-MCNC: 7.9 G/DL (ref 6–8.4)
RBC # BLD AUTO: 4.42 M/UL (ref 4–5.4)
SARS-COV-2 RDRP RESP QL NAA+PROBE: NEGATIVE
SODIUM SERPL-SCNC: 144 MMOL/L (ref 136–145)
TSH SERPL DL<=0.005 MIU/L-ACNC: 1.95 UIU/ML (ref 0.4–4)
WBC # BLD AUTO: 7.57 K/UL (ref 3.9–12.7)

## 2020-06-16 PROCEDURE — U0002 COVID-19 LAB TEST NON-CDC: HCPCS

## 2020-06-16 PROCEDURE — 25000003 PHARM REV CODE 250: Performed by: EMERGENCY MEDICINE

## 2020-06-16 PROCEDURE — 25000242 PHARM REV CODE 250 ALT 637 W/ HCPCS: Performed by: EMERGENCY MEDICINE

## 2020-06-16 PROCEDURE — 27000221 HC OXYGEN, UP TO 24 HOURS

## 2020-06-16 PROCEDURE — 85025 COMPLETE CBC W/AUTO DIFF WBC: CPT

## 2020-06-16 PROCEDURE — 94761 N-INVAS EAR/PLS OXIMETRY MLT: CPT

## 2020-06-16 PROCEDURE — 80053 COMPREHEN METABOLIC PANEL: CPT

## 2020-06-16 PROCEDURE — 99284 EMERGENCY DEPT VISIT MOD MDM: CPT | Mod: 25

## 2020-06-16 PROCEDURE — 36000 PLACE NEEDLE IN VEIN: CPT

## 2020-06-16 PROCEDURE — 83735 ASSAY OF MAGNESIUM: CPT

## 2020-06-16 PROCEDURE — 84443 ASSAY THYROID STIM HORMONE: CPT

## 2020-06-16 PROCEDURE — 94640 AIRWAY INHALATION TREATMENT: CPT

## 2020-06-16 RX ORDER — SODIUM CHLORIDE 9 MG/ML
1000 INJECTION, SOLUTION INTRAVENOUS
Status: COMPLETED | OUTPATIENT
Start: 2020-06-16 | End: 2020-06-16

## 2020-06-16 RX ORDER — IPRATROPIUM BROMIDE AND ALBUTEROL SULFATE 2.5; .5 MG/3ML; MG/3ML
3 SOLUTION RESPIRATORY (INHALATION)
Status: COMPLETED | OUTPATIENT
Start: 2020-06-16 | End: 2020-06-16

## 2020-06-16 RX ADMIN — SODIUM CHLORIDE 1000 ML: 0.9 INJECTION, SOLUTION INTRAVENOUS at 11:06

## 2020-06-16 RX ADMIN — IPRATROPIUM BROMIDE AND ALBUTEROL SULFATE 3 ML: .5; 3 SOLUTION RESPIRATORY (INHALATION) at 11:06

## 2020-06-16 NOTE — ED NOTES
Pt to ED with c/o alcohol and steroid withdrawal. Pt reports drinking a couple sips of vodka this AM and taking prednisone this AM. Pt reports previous seizure from alcohol withdrawal. No tremors noted. Pt AAOx4. Pt reports generalized fatigue. Pt reports history of ETOH abuse and chronic steroid. Pt reports history of COPD.Pt denies n/v/d, SOB, CP. Pt ambulated to ED stretcher with steady gait. Pt attached to pulse ox and BP cycled.

## 2020-06-16 NOTE — ED NOTES
"RN called to room by pt, shouting for RN. Pt reporting fine tremors to upper extremities, states "That breathing medicine made me shaky". RN explained to pt that side effects of albuterol can cause a sensation of "jittereyness". Pt verbalized understanding. Pt requesting to leave ED at this time, requesting results of bloodwork and imaging. MD made aware. Pt remains AAOx4, RR even and unlabored, tolerating 3L of O2 via [place] well. Will continue to monitor.    "

## 2020-06-16 NOTE — ED PROVIDER NOTES
Encounter Date: 6/16/2020    SCRIBE #1 NOTE: I, Maura Singh, am scribing for, and in the presence of, Dr. Peter.       History     Chief Complaint   Patient presents with    Alcohol Intoxication     reports withdrawing from ETOH and prednisone. last drink this morning     Time seen by provider: 10:51 AM    This is a 60 y.o. female with a history of alcohol abuse and sarcoidosis who presents with complaint of generalized weakness, shaking, and dehydration for the past several weeks. She is currently tapering off Prednisone for her sarcoidosis. She started at 30mg and is now on 20mg. She also intends to decreased alcohol use. She has been drinking half a bottle of vodka for some time which has worsened since her son passed away last month. She has a history of recently diagnosed COPD and still smokes regularly. She uses 3L oxygen at home. She denies additional complaints at this time.    The history is provided by the patient.     Review of patient's allergies indicates:   Allergen Reactions    Fentanyl Other (See Comments)     Other reaction(s): Itching    Lortab  [hydrocodone-acetaminophen] Other (See Comments)    Phenothiazines      Past Medical History:   Diagnosis Date    Anemia     Anemia     Depression     Diverticulitis     Fatty liver     GERD (gastroesophageal reflux disease)     Hyperlipidemia     Hypertension     Pancreatitis     Peptic ulcer disease     Polysubstance abuse     Posterior reversible encephalopathy syndrome     Sarcoidosis of lung     Sarcoidosis of lung     over 30 yrs ago    Seizures     7/2017    Suicide attempt     Suicide ideation      Past Surgical History:   Procedure Laterality Date    APPENDECTOMY      COLONOSCOPY N/A 7/28/2017    Procedure: COLONOSCOPY;  Surgeon: Aaron Alvarado MD;  Location: [facility];  Service: Endoscopy;  Laterality: N/A;    ESOPHAGOGASTRODUODENOSCOPY  10/7/2016, 11/6/2014    2016 - gastritis, duodenitis, 2014 erosive gastritis     ESOPHAGOGASTRODUODENOSCOPY N/A 2/11/2020    Procedure: ESOPHAGOGASTRODUODENOSCOPY (EGD);  Surgeon: Fawn Garrido MD;  Location: [facility];  Service: Endoscopy;  Laterality: N/A;    FLEXIBLE SIGMOIDOSCOPY  11/06/2014    colitis    HYSTERECTOMY      INJECTION OF JOINT Right 10/10/2019    Procedure: Injection, Joint RIGHT ILIOPSOAS BURSA/TENDON INJECTION AND RIGHT GLUTEAL TENDON INJECTION WITH STEROID AND LIDOCAINE;  Surgeon: Guillaume Rico MD;  Location: [facility];  Service: Pain Management;  Laterality: Right;  NEEDS CONSENT    mediastenoscopy      TONSILLECTOMY N/A 1970    TUBAL LIGATION       Family History   Problem Relation Age of Onset    Heart attack Father     Diabetes Father     Hypertension Father     Diabetes Mother     Hypertension Mother     Breast cancer Maternal Aunt     Colon cancer Maternal Uncle     Breast cancer Daughter     Ovarian cancer Neg Hx      Social History     Tobacco Use    Smoking status: Current Every Day Smoker     Packs/day: 1.00     Years: 30.00     Pack years: 30.00     Types: Cigarettes    Smokeless tobacco: Never Used   Substance Use Topics    Alcohol use: Yes     Comment: daily     Drug use: No     Review of Systems   Constitutional: Negative for chills and diaphoresis.   HENT: Negative for congestion.    Eyes: Negative for discharge.   Respiratory: Negative for shortness of breath.    Cardiovascular: Negative for chest pain.   Gastrointestinal: Negative for abdominal pain.   Endocrine:        Positive for feeling dehydrated.   Genitourinary: Negative for dysuria.   Musculoskeletal: Negative for back pain.   Neurological: Positive for tremors and weakness (generalized). Negative for dizziness.   Hematological: Does not bruise/bleed easily.   All other systems reviewed and are negative.      Physical Exam     Initial Vitals [06/16/20 1013]   BP Pulse Resp Temp SpO2   134/63 90 18 98.2 °F (36.8 °C) (!) 90 %      MAP       --         Physical  Exam    Nursing note and vitals reviewed.  Constitutional: She appears well-developed and well-nourished.  Non-toxic appearance. She does not have a sickly appearance. No distress.   HENT:   Head: Normocephalic and atraumatic.   Nose: Nose normal.   Mouth/Throat: Oropharynx is clear and moist.   Eyes: Conjunctivae, EOM and lids are normal. Pupils are equal, round, and reactive to light. Right eye exhibits no nystagmus. Left eye exhibits no nystagmus.   Neck: Trachea normal, normal range of motion and phonation normal. Neck supple. No stridor present.   Cardiovascular: Normal rate, regular rhythm, normal heart sounds and normal pulses. Exam reveals no gallop and no friction rub.    No murmur heard.  Pulmonary/Chest: No respiratory distress. She has wheezes (mild). She has no rhonchi. She has no rales.   Abdominal: Soft. Normal appearance and bowel sounds are normal. She exhibits no distension. There is no abdominal tenderness. There is no rigidity, no rebound, no guarding, no tenderness at McBurney's point and negative Mccollum's sign.   Musculoskeletal: No tenderness or edema.   Neurological: She is alert and oriented to person, place, and time. She has normal strength and normal reflexes. She displays normal reflexes. No cranial nerve deficit or sensory deficit. She displays a negative Romberg sign. GCS eye subscore is 4. GCS verbal subscore is 5. GCS motor subscore is 6.   No tremor.   Skin: Skin is warm, dry and intact. Capillary refill takes less than 2 seconds. No rash noted. No pallor.   Psychiatric: Her speech is normal and behavior is normal.         ED Course   Procedures  Labs Reviewed   CBC W/ AUTO DIFFERENTIAL - Abnormal; Notable for the following components:       Result Value    Mean Corpuscular Hemoglobin Conc 31.0 (*)     RDW 19.4 (*)     Platelets 88 (*)     Immature Granulocytes 0.7 (*)     Immature Grans (Abs) 0.05 (*)     Gran% 37.2 (*)     Lymph% 54.7 (*)     All other components within normal  limits   COMPREHENSIVE METABOLIC PANEL - Abnormal; Notable for the following components:    Total Bilirubin 1.1 (*)      (*)     ALT 79 (*)     Anion Gap 19 (*)     All other components within normal limits   MAGNESIUM   SARS-COV-2 RNA AMPLIFICATION, QUAL   TSH          Imaging Results          X-Ray Chest AP Portable (Final result)  Result time 06/16/20 11:35:56    Final result by James Sotelo DO (06/16/20 11:35:56)                 Impression:      Please see above      Electronically signed by: James Sotelo DO  Date:    06/16/2020  Time:    11:35             Narrative:    EXAMINATION:  XR CHEST AP PORTABLE    CLINICAL HISTORY:  shortness of breath;    TECHNIQUE:  Two frontal views of the chest was performed.    COMPARISON:  05/24/2020    FINDINGS:  Slight small lung volumes with continued nonspecific elevation right lung base.  No focal lung consolidation.  No large pleural effusion or pneumothorax.  There is slight prominence of the hilar vasculature which may be artifactual or component of vascular congestion.  Continued atherosclerotic aorta.  Clinical correlation and further evaluation as warranted                              X-Rays:   Independently Interpreted Readings:   Chest X-Ray: Increased pulmonary congestion. Heart size normal. Right base has small infiltrate. No bony abnormalities.     Medical Decision Making:   History:   Old Medical Records: I decided to obtain old medical records.  Independently Interpreted Test(s):   I have ordered and independently interpreted X-rays - see prior notes.  Clinical Tests:   Lab Tests: Ordered and Reviewed  Radiological Study: Ordered and Reviewed            Scribe Attestation:   Scribe #1: I performed the above scribed service and the documentation accurately describes the services I performed. I attest to the accuracy of the note.    Attending Attestation:           Physician Attestation for Scribe:  Physician Attestation Statement for Scribe #1: I,  Dr. Peter, reviewed documentation, as scribed by Maura Singh in my presence, and it is both accurate and complete.                 ED Course as of Jun 16 1253   Tue Jun 16, 2020   1123 60-year-old female presents with concern of alcohol withdrawal syndrome as well as his weakness is been going on for weeks.  Also coming off the steroids should she has had chronic use for possible sarcoidosis.  Recent hospitalization at [facility] for questionable COVID-19 like symptoms.  Her test was negative however they stated they felt she was positive.  Physical exam reveals no acute abnormalities in vital signs are normal.  Will get labs to rule out electrolyte abnormalities, acute kidney injury.  Will test for COVID-19 as well as get a chest x-ray.  She does have wheezing on examination she is with a history of COPD.  Will give some breathing treatments and observed.    [SM]   1223 CMP reviewed showing no acute abnormalities other than elevated LFTs.  This is likely secondary to alcohol.  No other electrolyte deficiencies in kidney function is normal.  CBC is reviewed which is normal as well as TSH.  Magnesium is normal in COVID-19 negative.    [SM]   1252 Discussed the findings with the patient.  Do not feel any further workup is indicated.  She states she and her  are currently working on getting her into alcohol rehab.    Patient discharged home in stable condition. Diagnosis and treatment plan explained to patient and/or family member who is at bedside. I have answered all questions and the patient is satisfied with the plan of care. The patient demonstrates understanding of the care plan. This is the extent to the patients complaints today here in the emergency department.    [SM]      ED Course User Index  [SM] Grant Peter,                 Clinical Impression:     1. Weakness    2. Alcohol abuse    3. COPD exacerbation                ED Disposition Condition    Discharge Stable        ED  Prescriptions     None        Follow-up Information     Follow up With Specialties Details Why Contact Info    Izzy Garcia MD Family Medicine Schedule an appointment as soon as possible for a visit   [address] [address]  [phone]                                       Grant Peter,   06/16/20 1253

## 2020-06-23 ENCOUNTER — TELEPHONE (OUTPATIENT)
Dept: SLEEP MEDICINE | Facility: OTHER | Age: 60
End: 2020-06-23

## 2020-07-01 ENCOUNTER — TELEPHONE (OUTPATIENT)
Dept: SLEEP MEDICINE | Facility: OTHER | Age: 60
End: 2020-07-01

## 2020-07-09 ENCOUNTER — TELEPHONE (OUTPATIENT)
Dept: SLEEP MEDICINE | Facility: OTHER | Age: 60
End: 2020-07-09

## 2020-07-15 ENCOUNTER — TELEPHONE (OUTPATIENT)
Dept: SLEEP MEDICINE | Facility: OTHER | Age: 60
End: 2020-07-15

## 2020-08-17 DIAGNOSIS — Z12.31 VISIT FOR SCREENING MAMMOGRAM: Primary | ICD-10-CM

## 2020-08-20 ENCOUNTER — HOSPITAL ENCOUNTER (OUTPATIENT)
Dept: RADIOLOGY | Facility: HOSPITAL | Age: 60
Discharge: HOME | End: 2020-08-20
Attending: FAMILY MEDICINE
Payer: COMMERCIAL

## 2020-08-20 DIAGNOSIS — Z12.39 SCREENING FOR BREAST CANCER: ICD-10-CM

## 2020-08-20 PROCEDURE — 77067 SCR MAMMO BI INCL CAD: CPT | Mod: TC

## 2020-08-20 PROCEDURE — 77063 BREAST TOMOSYNTHESIS BI: CPT | Mod: 26,,, | Performed by: RADIOLOGY

## 2020-08-20 PROCEDURE — 77067 SCR MAMMO BI INCL CAD: CPT | Mod: 26,,, | Performed by: RADIOLOGY

## 2020-08-20 PROCEDURE — 77067 MAMMO DIGITAL SCREENING BILAT WITH TOMOSYNTHESIS_CAD: ICD-10-PCS | Mod: 26,,, | Performed by: RADIOLOGY

## 2020-08-20 PROCEDURE — 77063 MAMMO DIGITAL SCREENING BILAT WITH TOMOSYNTHESIS_CAD: ICD-10-PCS | Mod: 26,,, | Performed by: RADIOLOGY

## 2020-08-24 ENCOUNTER — TELEPHONE (OUTPATIENT)
Dept: RADIOLOGY | Facility: HOSPITAL | Age: 60
End: 2020-08-24

## 2020-08-24 ENCOUNTER — TELEPHONE (OUTPATIENT)
Dept: SLEEP MEDICINE | Facility: CLINIC | Age: 60
End: 2020-08-24

## 2020-08-24 NOTE — TELEPHONE ENCOUNTER
----- Message from Carmen Miller sent at 8/24/2020  2:43 PM CDT -----  Good afternoon,    Patient was seen by Dr. Gregorio in June 2020 and she stated that she was supposed to received an appointment for a sleep study. Can you please review and give patient a call back.     Thank you,  [facility]  Paco Hahn

## 2020-08-24 NOTE — TELEPHONE ENCOUNTER
Spoke with patient and explained mammogram findings.Patient expressed understanding of results. Patient scheduled abnormal mammogram follow up appointment at [facility] [place] on 9/4/2020.

## 2020-08-27 ENCOUNTER — TELEPHONE (OUTPATIENT)
Dept: SLEEP MEDICINE | Facility: OTHER | Age: 60
End: 2020-08-27

## 2020-08-29 ENCOUNTER — HOSPITAL ENCOUNTER (OUTPATIENT)
Dept: RADIOLOGY | Facility: HOSPITAL | Age: 60
Discharge: HOME | End: 2020-08-29
Attending: FAMILY MEDICINE
Payer: COMMERCIAL

## 2020-08-29 ENCOUNTER — HOSPITAL ENCOUNTER (OUTPATIENT)
Dept: RADIOLOGY | Facility: HOSPITAL | Age: 60
Discharge: HOME | End: 2020-08-29
Attending: ORTHOPAEDIC SURGERY
Payer: COMMERCIAL

## 2020-08-29 DIAGNOSIS — M51.36 OTHER INTERVERTEBRAL DISC DEGENERATION, LUMBAR REGION: ICD-10-CM

## 2020-08-29 DIAGNOSIS — M48.061 SPINAL STENOSIS OF LUMBAR REGION WITHOUT NEUROGENIC CLAUDICATION: ICD-10-CM

## 2020-08-29 DIAGNOSIS — M51.26 OTHER INTERVERTEBRAL DISC DISPLACEMENT, LUMBAR REGION: ICD-10-CM

## 2020-08-29 DIAGNOSIS — Z09 ENCOUNTER FOR FOLLOW-UP EXAMINATION AFTER COMPLETED TREATMENT FOR CONDITIONS OTHER THAN MALIGNANT NEOPLASM: ICD-10-CM

## 2020-08-29 DIAGNOSIS — M48.062 SPINAL STENOSIS OF LUMBAR REGION WITH NEUROGENIC CLAUDICATION: ICD-10-CM

## 2020-08-29 DIAGNOSIS — M96.0 PSEUDARTHROSIS AFTER FUSION OR ARTHRODESIS: ICD-10-CM

## 2020-08-29 DIAGNOSIS — M41.86 OTHER FORM OF SCOLIOSIS OF LUMBAR SPINE: ICD-10-CM

## 2020-08-29 DIAGNOSIS — R59.1 LYMPHADENOPATHY: ICD-10-CM

## 2020-08-29 LAB
CREAT SERPL-MCNC: 0.7 MG/DL (ref 0.5–1.4)
SAMPLE: NORMAL

## 2020-08-29 PROCEDURE — 25500020 PHARM REV CODE 255

## 2020-08-29 PROCEDURE — 72131 CT LUMBAR SPINE W/O DYE: CPT | Mod: TC

## 2020-08-29 PROCEDURE — 71260 CT THORAX DX C+: CPT | Mod: 26,,, | Performed by: RADIOLOGY

## 2020-08-29 PROCEDURE — 74177 CT CHEST ABDOMEN PELVIS WITH CONTRAST (XPD): ICD-10-PCS | Mod: 26,,, | Performed by: RADIOLOGY

## 2020-08-29 PROCEDURE — 74177 CT ABD & PELVIS W/CONTRAST: CPT | Mod: 26,,, | Performed by: RADIOLOGY

## 2020-08-29 PROCEDURE — 72131 CT LUMBAR SPINE W/O DYE: CPT | Mod: 26,,, | Performed by: RADIOLOGY

## 2020-08-29 PROCEDURE — 72131 CT LUMBAR SPINE WITHOUT CONTRAST: ICD-10-PCS | Mod: 26,,, | Performed by: RADIOLOGY

## 2020-08-29 PROCEDURE — 71260 CT CHEST ABDOMEN PELVIS WITH CONTRAST (XPD): ICD-10-PCS | Mod: 26,,, | Performed by: RADIOLOGY

## 2020-08-29 PROCEDURE — 74177 CT ABD & PELVIS W/CONTRAST: CPT | Mod: TC

## 2020-08-29 RX ADMIN — IOHEXOL 100 ML: 350 INJECTION, SOLUTION INTRAVENOUS at 02:08

## 2020-08-29 RX ADMIN — IOHEXOL 15 ML: 350 INJECTION, SOLUTION INTRAVENOUS at 11:08

## 2020-09-04 ENCOUNTER — HOSPITAL ENCOUNTER (OUTPATIENT)
Dept: RADIOLOGY | Facility: HOSPITAL | Age: 60
Discharge: HOME | End: 2020-09-04
Attending: OBSTETRICS & GYNECOLOGY
Payer: COMMERCIAL

## 2020-09-04 DIAGNOSIS — R92.8 ABNORMAL MAMMOGRAM: ICD-10-CM

## 2020-09-04 PROCEDURE — 77065 MAMMO DIGITAL DIAGNOSTIC RIGHT WITH TOMOSYNTHESIS_CAD: ICD-10-PCS | Mod: 26,,, | Performed by: RADIOLOGY

## 2020-09-04 PROCEDURE — 77061 BREAST TOMOSYNTHESIS UNI: CPT | Mod: TC

## 2020-09-04 PROCEDURE — 76642 ULTRASOUND BREAST LIMITED: CPT | Mod: TC,RT

## 2020-09-04 PROCEDURE — 77065 DX MAMMO INCL CAD UNI: CPT | Mod: TC

## 2020-09-04 PROCEDURE — 77065 DX MAMMO INCL CAD UNI: CPT | Mod: 26,,, | Performed by: RADIOLOGY

## 2020-09-04 PROCEDURE — 77061 BREAST TOMOSYNTHESIS UNI: CPT | Mod: 26,,, | Performed by: RADIOLOGY

## 2020-09-04 PROCEDURE — 76642 US BREAST RIGHT LIMITED: ICD-10-PCS | Mod: 26,RT,, | Performed by: RADIOLOGY

## 2020-09-04 PROCEDURE — 77061 MAMMO DIGITAL DIAGNOSTIC RIGHT WITH TOMOSYNTHESIS_CAD: ICD-10-PCS | Mod: 26,,, | Performed by: RADIOLOGY

## 2020-09-04 PROCEDURE — 76642 ULTRASOUND BREAST LIMITED: CPT | Mod: 26,RT,, | Performed by: RADIOLOGY

## 2020-09-08 ENCOUNTER — TELEPHONE (OUTPATIENT)
Dept: RADIOLOGY | Facility: HOSPITAL | Age: 60
End: 2020-09-08

## 2020-09-09 ENCOUNTER — TELEPHONE (OUTPATIENT)
Dept: RADIOLOGY | Facility: HOSPITAL | Age: 60
End: 2020-09-09

## 2020-09-11 ENCOUNTER — TELEPHONE (OUTPATIENT)
Dept: RADIOLOGY | Facility: HOSPITAL | Age: 60
End: 2020-09-11

## 2020-09-11 NOTE — TELEPHONE ENCOUNTER
Spoke with patient. Reviewed breast biopsy procedure and reviewed instructions for breast biopsy. Patient expressed understanding and all questions were answered. Provided patient with my phone number to call for any further concerns or questions.   Patient scheduled breast biopsy at the [facility] on 9/29/2020.

## 2020-09-29 ENCOUNTER — HOSPITAL ENCOUNTER (OUTPATIENT)
Dept: RADIOLOGY | Facility: HOSPITAL | Age: 60
Discharge: HOME | End: 2020-09-29
Attending: OBSTETRICS & GYNECOLOGY
Payer: COMMERCIAL

## 2020-09-29 DIAGNOSIS — R92.8 ABNORMAL MAMMOGRAM: ICD-10-CM

## 2020-09-29 PROCEDURE — 88305 TISSUE EXAM BY PATHOLOGIST: ICD-10-PCS | Mod: 26,,, | Performed by: PATHOLOGY

## 2020-09-29 PROCEDURE — 88341 IMHCHEM/IMCYTCHM EA ADD ANTB: CPT | Mod: 26,59,, | Performed by: PATHOLOGY

## 2020-09-29 PROCEDURE — 77065 DX MAMMO INCL CAD UNI: CPT | Mod: 26,RT,, | Performed by: RADIOLOGY

## 2020-09-29 PROCEDURE — 77065 MAMMO DIGITAL DIAGNOSTIC RIGHT WITH CAD: ICD-10-PCS | Mod: 26,RT,, | Performed by: RADIOLOGY

## 2020-09-29 PROCEDURE — 88305 TISSUE EXAM BY PATHOLOGIST: CPT | Mod: 26,,, | Performed by: PATHOLOGY

## 2020-09-29 PROCEDURE — 88341 PR IHC OR ICC EACH ADD'L SINGLE ANTIBODY  STAINPR: ICD-10-PCS | Mod: 26,59,, | Performed by: PATHOLOGY

## 2020-09-29 PROCEDURE — 27201068 US BREAST BIOPSY WITH IMAGING 1ST SITE RIGHT

## 2020-09-29 PROCEDURE — 88377 M/PHMTRC ALYS ISHQUANT/SEMIQ: CPT | Performed by: PATHOLOGY

## 2020-09-29 PROCEDURE — 77065 DX MAMMO INCL CAD UNI: CPT | Mod: TC,RT

## 2020-09-29 PROCEDURE — 88342 IMHCHEM/IMCYTCHM 1ST ANTB: CPT | Mod: 26,59,, | Performed by: PATHOLOGY

## 2020-09-29 PROCEDURE — 19083 BX BREAST 1ST LESION US IMAG: CPT | Mod: RT,,, | Performed by: RADIOLOGY

## 2020-09-29 PROCEDURE — 88342 CHG IMMUNOCYTOCHEMISTRY: ICD-10-PCS | Mod: 26,59,, | Performed by: PATHOLOGY

## 2020-09-29 PROCEDURE — 25000003 PHARM REV CODE 250: Performed by: OBSTETRICS & GYNECOLOGY

## 2020-09-29 PROCEDURE — 88305 TISSUE EXAM BY PATHOLOGIST: CPT | Performed by: PATHOLOGY

## 2020-09-29 PROCEDURE — 88360 TUMOR IMMUNOHISTOCHEM/MANUAL: CPT | Mod: 26,,, | Performed by: PATHOLOGY

## 2020-09-29 PROCEDURE — 88360 TUMOR IMMUNOHISTOCHEM/MANUAL: CPT | Performed by: PATHOLOGY

## 2020-09-29 PROCEDURE — 19083 US BREAST BIOPSY WITH IMAGING 1ST SITE RIGHT: ICD-10-PCS | Mod: RT,,, | Performed by: RADIOLOGY

## 2020-09-29 PROCEDURE — 88360 PR  TUMOR IMMUNOHISTOCHEM/MANUAL: ICD-10-PCS | Mod: 26,,, | Performed by: PATHOLOGY

## 2020-09-29 RX ORDER — LIDOCAINE HYDROCHLORIDE AND EPINEPHRINE 20; 10 MG/ML; UG/ML
20 INJECTION, SOLUTION INFILTRATION; PERINEURAL ONCE
Status: COMPLETED | OUTPATIENT
Start: 2020-09-29 | End: 2020-09-29

## 2020-09-29 RX ORDER — LIDOCAINE HYDROCHLORIDE 10 MG/ML
5 INJECTION, SOLUTION EPIDURAL; INFILTRATION; INTRACAUDAL; PERINEURAL ONCE
Status: COMPLETED | OUTPATIENT
Start: 2020-09-29 | End: 2020-09-29

## 2020-09-29 RX ADMIN — LIDOCAINE HYDROCHLORIDE 50 MG: 10 INJECTION, SOLUTION EPIDURAL; INFILTRATION; INTRACAUDAL; PERINEURAL at 03:09

## 2020-09-29 RX ADMIN — LIDOCAINE HYDROCHLORIDE,EPINEPHRINE BITARTRATE 20 ML: 20; .01 INJECTION, SOLUTION INFILTRATION; PERINEURAL at 03:09

## 2020-09-30 ENCOUNTER — HOSPITAL ENCOUNTER (OUTPATIENT)
Dept: SLEEP MEDICINE | Facility: OTHER | Age: 60
Discharge: HOME | End: 2020-09-30
Attending: INTERNAL MEDICINE
Payer: COMMERCIAL

## 2020-09-30 DIAGNOSIS — G47.61 PERIODIC LIMB MOVEMENT DISORDER (PLMD): ICD-10-CM

## 2020-09-30 DIAGNOSIS — R06.83 SNORING: ICD-10-CM

## 2020-09-30 DIAGNOSIS — I10 ESSENTIAL HYPERTENSION: ICD-10-CM

## 2020-09-30 DIAGNOSIS — K70.30 ALCOHOLIC CIRRHOSIS OF LIVER WITHOUT ASCITES: ICD-10-CM

## 2020-09-30 DIAGNOSIS — G47.33 OSA (OBSTRUCTIVE SLEEP APNEA): Primary | ICD-10-CM

## 2020-09-30 DIAGNOSIS — R56.9 NEW ONSET SEIZURE: ICD-10-CM

## 2020-09-30 DIAGNOSIS — I67.83 POSTERIOR REVERSIBLE ENCEPHALOPATHY SYNDROME: ICD-10-CM

## 2020-09-30 DIAGNOSIS — M79.7 FIBROMYALGIA: ICD-10-CM

## 2020-09-30 DIAGNOSIS — F10.20 ALCOHOL DEPENDENCE, CONTINUOUS: ICD-10-CM

## 2020-09-30 DIAGNOSIS — F51.3 SLEEP WALKING: ICD-10-CM

## 2020-09-30 DIAGNOSIS — G47.30 SLEEP APNEA, UNSPECIFIED TYPE: ICD-10-CM

## 2020-09-30 PROCEDURE — 95810 PR POLYSOMNOGRAPHY, 4 OR MORE: ICD-10-PCS | Mod: 26,,, | Performed by: INTERNAL MEDICINE

## 2020-09-30 PROCEDURE — 95810 POLYSOM 6/> YRS 4/> PARAM: CPT | Mod: 26,,, | Performed by: INTERNAL MEDICINE

## 2020-09-30 PROCEDURE — 95810 POLYSOM 6/> YRS 4/> PARAM: CPT

## 2020-10-01 NOTE — PROGRESS NOTES
A PSG with Parasomnia montage was preformed on Earl Abdul on the night of 9/30/2020. The procedure was explained to the patient. All questions were asked and answered prior to the strat of the study. The patient did not meet split night criteria set by the doctor.    Little SDB events appeared to have been noted. Snoring was noted to be mild.    The EKG seemed to have shown NSR with PVC's. The lowest Spo2 noted was 84%.    Movement in arm leads noted during ZREM sleep. The patient stated she didnt remember any of her dreams. No talking or mumbling noted during the night.    Disposable equipment used where applicable. An end of the night instruction sheet was giving to the patient upon leaving the lab.

## 2020-10-02 ENCOUNTER — TELEPHONE (OUTPATIENT)
Dept: SLEEP MEDICINE | Facility: CLINIC | Age: 60
End: 2020-10-02

## 2020-10-02 ENCOUNTER — DOCUMENTATION ONLY (OUTPATIENT)
Dept: SURGERY | Facility: CLINIC | Age: 60
End: 2020-10-02

## 2020-10-02 NOTE — PROCEDURES
Patient Name: PIPER [facility] #: [id number]   Sex: Female Study Date: 2020   : 1960 Clinic #: [id number]   Age: 60 Referring Physician: Baylee Gregorio MD   Height: 66.0 in Referring Physician #    Weight: 190.0 lbs Sleep Specialist:    BCALVIN.I.: 30.7 Sleep Specialist #    Hypopnea rule: AASM 1B Scoring Tech: Tatyana,RPSGT   Total AHI: 1.8 Recording Tech: KELLY ASHBY RRT, RPSGT   Lowest O2 sat: 83.0% Recording Location: [facility]     Sleep architecture: This is a baseline polysomnogram. At lights out, the patient fell asleep in 3.5 minutes and slept for 87.4% of the time. Total sleep time (TST) was 398.5 minutes. 9.4% of TST was in Stage N1 sleep, 9.9% TST in slow wave sleep, and 26.0% TST in REM sleep. The REM latency was 75.0 minutes.     Respiratory: Snoring was present. There was no significant VINICIO (obstructive sleep apnea) based on AHI (apnea hypopnea index) criteria. The overall AHI was 1.8 with an oxygen krish of 83.0%.  The supine AHI was 21.3 and the REM AHI was 0.0.     Motor movement / Parasomnia:   There were occasional limb movements of sleep noted, but they did not result in arousals.   The total limb movement index was 7.7 (0.0 with arousal).       Cardiac: Cardiac rhythm monitoring revealed a normal sinus rhythm with occasional PACs and PVCs.      IMPRESSION:  1. Positional VINICIO (327.23) based on AHI criteria  2. Minimal Periodic Limb Movement Disorder    RECOMMENDATION:  1. Patient should avoid sleeping in the supine position by use of a snore ball or similar technique.  2. No therapy is recommended for Periodic Limb Movement Disorder  3. Patient has follow up with Sleep Medicine.

## 2020-10-02 NOTE — NURSING
Called Patient with results of breast biopsy from 9/29/2020.  Explained that the biopsy showed invasive ductal carcinoma . Discussed what this means and that the next step is to meet with a breast surgeon. An appt was made for 10/6/2020 with Dr. Kevin.  Reviewed location of breast center. Patient verbalized understanding.  E mailed patient appt and educational information  Oncology Navigation   Intake  Cancer Type: Breast  MD Assigned: Dr. Kevin  Internal / External Referral: Internal  Initial Nurse Navigator Contact: 10/02/20  Date Worked: 10/02/20  First Appointment Available: 10/06/20  Appointment Date: 10/06/20  Schedule to Appointment Timeline (days): 4  First Available Date vs. Scheduled Date (days): 0     Treatment  Current Status: Staging work-up       Procedures: Biopsy  Biopsy Schedule Date: 09/29/20       ER: Positive  [place]: Positive     Acuity      Follow Up  No follow-ups on file.

## 2020-10-06 ENCOUNTER — OFFICE VISIT (OUTPATIENT)
Dept: SURGERY | Facility: CLINIC | Age: 60
End: 2020-10-06
Payer: COMMERCIAL

## 2020-10-06 ENCOUNTER — DOCUMENTATION ONLY (OUTPATIENT)
Dept: SURGERY | Facility: CLINIC | Age: 60
End: 2020-10-06

## 2020-10-06 ENCOUNTER — LAB VISIT (OUTPATIENT)
Dept: LAB | Facility: HOSPITAL | Age: 60
End: 2020-10-06
Attending: SURGERY
Payer: COMMERCIAL

## 2020-10-06 VITALS
WEIGHT: 187 LBS | HEIGHT: 66 IN | BODY MASS INDEX: 30.05 KG/M2 | SYSTOLIC BLOOD PRESSURE: 126 MMHG | TEMPERATURE: 99 F | DIASTOLIC BLOOD PRESSURE: 62 MMHG | HEART RATE: 79 BPM

## 2020-10-06 DIAGNOSIS — C50.919 BREAST CANCER: ICD-10-CM

## 2020-10-06 DIAGNOSIS — C50.811 MALIGNANT NEOPLASM OF OVERLAPPING SITES OF RIGHT BREAST IN FEMALE, ESTROGEN RECEPTOR POSITIVE: Primary | ICD-10-CM

## 2020-10-06 DIAGNOSIS — Z17.0 MALIGNANT NEOPLASM OF OVERLAPPING SITES OF RIGHT BREAST IN FEMALE, ESTROGEN RECEPTOR POSITIVE: Primary | ICD-10-CM

## 2020-10-06 LAB
FINAL PATHOLOGIC DIAGNOSIS: NORMAL
GROSS: NORMAL
Lab: NORMAL
SUPPLEMENTAL DIAGNOSIS: NORMAL

## 2020-10-06 PROCEDURE — 3078F PR MOST RECENT DIASTOLIC BLOOD PRESSURE < 80 MM HG: ICD-10-PCS | Mod: CPTII,S$GLB,, | Performed by: SURGERY

## 2020-10-06 PROCEDURE — 3074F SYST BP LT 130 MM HG: CPT | Mod: CPTII,S$GLB,, | Performed by: SURGERY

## 2020-10-06 PROCEDURE — 99205 PR OFFICE/OUTPT VISIT, NEW, LEVL V, 60-74 MIN: ICD-10-PCS | Mod: S$GLB,,, | Performed by: SURGERY

## 2020-10-06 PROCEDURE — 36415 COLL VENOUS BLD VENIPUNCTURE: CPT

## 2020-10-06 PROCEDURE — 3008F BODY MASS INDEX DOCD: CPT | Mod: CPTII,S$GLB,, | Performed by: SURGERY

## 2020-10-06 PROCEDURE — 3008F PR BODY MASS INDEX (BMI) DOCUMENTED: ICD-10-PCS | Mod: CPTII,S$GLB,, | Performed by: SURGERY

## 2020-10-06 PROCEDURE — 3078F DIAST BP <80 MM HG: CPT | Mod: CPTII,S$GLB,, | Performed by: SURGERY

## 2020-10-06 PROCEDURE — 99999 PR PBB SHADOW E&M-EST. PATIENT-LVL IV: ICD-10-PCS | Mod: PBBFAC,,, | Performed by: SURGERY

## 2020-10-06 PROCEDURE — 99205 OFFICE O/P NEW HI 60 MIN: CPT | Mod: S$GLB,,, | Performed by: SURGERY

## 2020-10-06 PROCEDURE — 99999 PR PBB SHADOW E&M-EST. PATIENT-LVL IV: CPT | Mod: PBBFAC,,, | Performed by: SURGERY

## 2020-10-06 PROCEDURE — 3074F PR MOST RECENT SYSTOLIC BLOOD PRESSURE < 130 MM HG: ICD-10-PCS | Mod: CPTII,S$GLB,, | Performed by: SURGERY

## 2020-10-06 RX ORDER — ATENOLOL 25 MG/1
TABLET ORAL
COMMUNITY
Start: 2020-10-01 | End: 2020-10-28

## 2020-10-06 NOTE — LETTER
October 7, 2020      Sandy Arnold MD  [address]  [address]  [place] [address]           [facility] entry  [address]  [place] [address]  Phone: [phone]  Fax: [phone]          Patient: Earl Abdul   MR Number: [id number]   YOB: 1960   Date of Visit: 10/6/2020       Dear Dr. Sandy Arnold:    Thank you for referring Earl Abdul to me for evaluation. Attached you will find relevant portions of my assessment and plan of care.    If you have questions, please do not hesitate to call me. I look forward to following Earl Abdul along with you.    Sincerely,    Baylee Kevin MD    Enclosure  CC:  No Recipients    If you would like to receive this communication electronically, please contact [email or web address] or [phone] to request more information on [employer] access.    For providers and/or their staff who would like to refer a patient to [facility], please contact us through our one-stop-shop provider referral line, [facility], at [phone].    If you feel you have received this communication in error or would no longer like to receive these types of communications, please e-mail [email or web address]

## 2020-10-06 NOTE — NURSING
Oncology Navigation   Intake  Date of Diagnosis: 09/29/20  Cancer Type: Breast  MD Assigned: Dr. Kevin  Internal / External Referral: Internal  Initial Nurse Navigator Contact: 10/02/20  Date Worked: 10/02/20  First Appointment Available: 10/06/20  Appointment Date: 10/06/20  Schedule to Appointment Timeline (days): 4  First Available Date vs. Scheduled Date (days): 0     Treatment  Current Status: Staging work-up       Procedures: Genetic test  Biopsy Schedule Date: 09/29/20  Genetic Test Schedule Date: 10/06/20 ([employer] [facility])    General Referrals: Other  Other Referral: Plastic surgery- on 10/14/2020    ER: Positive  DE: Positive  Support Systems: Spouse/significant other     Acuity      Follow Up  Follow up in about 15 days (around 10/21/2020) for [employer] results.

## 2020-10-06 NOTE — H&P (VIEW-ONLY)
Breast Surgery  [facility]  Department of Surgery      REFERRING PROVIDER: Sandy Arnold MD  [address] [address]    Chief Complaint: Breast Cancer (New Patient New Right Breast Cancer Invasive Ductal Carcinoma Bx done 20 .)      Subjective:      Patient ID: Earl Abdul is a 60 y.o. female who presents with Invasive Ductal carcinoma of the R breast.    Follow-up mammogram (2020) showed there is a 9 mm x 5 mm x 8 mm irregularly shaped, hypoechoic mass with indistinct margins seen in the right breast at 12 o'clock, 1 cm from the nipple. The mass correlates with the prior mammogram finding. Lymph nodes in the right axilla are normal. A ultrasound guided biopsy was performed on 2020 with pathology revealing infiltrating ductal carcinoma of the breast.     Patient does not routinely do self breast exams.  Patient has not noted a change on breast exam.  Patient denies nipple discharge. Patient denies to previous breast biopsy. Patient denies a personal history of breast cancer.    Findings at that time were the following:   Tumor size: 0.9 cm   Tumor rdgrdrrdarddrderd:rd rd3rd Estrogen Receptor: +   Progesterone Receptor: +   Her-2 josef: equivocal   Lymph node status: -   Lymphatic invasion: -     She is followed by hepatology at [facility].  She does not know of any significant changes over the last few years of her liver status.  She is on home oxygen at night and occasionally during the day when she sees need.  She recently underwent a sleep study but does not have the results for that just yet.    GYN History:  Age of menarche was 11. Age of menopause was 54.  Last menstrual period was 54 . Patient denies hormonal therapy. Patient is . Age of first live birth was 18. Patient did breast feed.    Past Medical History:   Diagnosis Date    Anemia     Anemia     Depression     Diverticulitis     Fatty liver     GERD (gastroesophageal reflux disease)      Hyperlipidemia     Hypertension     Pancreatitis     Peptic ulcer disease     Polysubstance abuse     Posterior reversible encephalopathy syndrome     Sarcoidosis of lung     Sarcoidosis of lung     over 30 yrs ago    Seizures     7/2017    Suicide attempt     Suicide ideation      Past Surgical History:   Procedure Laterality Date    APPENDECTOMY      COLONOSCOPY N/A 7/28/2017    Procedure: COLONOSCOPY;  Surgeon: Aaron Alvarado MD;  Location: [facility] ENDO;  Service: Endoscopy;  Laterality: N/A;    ESOPHAGOGASTRODUODENOSCOPY  10/7/2016, 11/6/2014    2016 - gastritis, duodenitis, 2014 erosive gastritis    ESOPHAGOGASTRODUODENOSCOPY N/A 2/11/2020    Procedure: ESOPHAGOGASTRODUODENOSCOPY (EGD);  Surgeon: Fawn Garrido MD;  Location: [facility] ENDO;  Service: Endoscopy;  Laterality: N/A;    FLEXIBLE SIGMOIDOSCOPY  11/06/2014    colitis    HYSTERECTOMY      INJECTION OF JOINT Right 10/10/2019    Procedure: Injection, Joint RIGHT ILIOPSOAS BURSA/TENDON INJECTION AND RIGHT GLUTEAL TENDON INJECTION WITH STEROID AND LIDOCAINE;  Surgeon: Guillaume Rico MD;  Location: [facility] PAIN MGT;  Service: Pain Management;  Laterality: Right;  NEEDS CONSENT    mediastenoscopy      TONSILLECTOMY N/A 1970    TUBAL LIGATION       Current Outpatient Medications on File Prior to Visit   Medication Sig Dispense Refill    acetaminophen (TYLENOL) 325 MG tablet Take 2 tablets (650 mg total) by mouth every 6 (six) hours as needed.  0    ARIPiprazole (ABILIFY) 2 MG Tab       atenoloL (TENORMIN) 25 MG tablet TK 1 T PO QD      dicyclomine (BENTYL) 20 mg tablet 10 mg.       dronabinol (MARINOL) 2.5 MG capsule Take 1 capsule (2.5 mg total) by mouth 2 (two) times daily before meals. 20 capsule 0    levothyroxine (SYNTHROID) 25 MCG tablet Take 1 tablet (25 mcg total) by mouth before breakfast. 30 tablet 0    omeprazole (PRILOSEC) 10 MG capsule Take 20 mg by mouth once daily.       ondansetron (ZOFRAN ODT) 8 MG TbDL Take 1 tablet (8 mg  total) by mouth 3 (three) times daily as needed (for nausea and vomiting). 30 tablet 0    rOPINIRole (REQUIP) 0.25 MG tablet       traZODone (DESYREL) 100 MG tablet TK 3 TS PO HS  1    gabapentin (NEURONTIN) 600 MG tablet       HYDROcodone-acetaminophen (NORCO)  mg per tablet       ipratropium (ATROVENT HFA) 17 mcg/actuation inhaler Inhale 2 puffs into the lungs every 6 (six) hours. Rescue for 14 days 1 Inhaler 0    predniSONE (DELTASONE) 10 MG tablet        No current facility-administered medications on file prior to visit.      Social History     Socioeconomic History    Marital status:      Spouse name: Not on file    Number of children: Not on file    Years of education: Not on file    Highest education level: Not on file   Occupational History    Not on file   Social Needs    Financial resource strain: Not on file    Food insecurity     Worry: Not on file     Inability: Not on file    Transportation needs     Medical: Not on file     Non-medical: Not on file   Tobacco Use    Smoking status: Current Every Day Smoker     Packs/day: 1.00     Years: 30.00     Pack years: 30.00     Types: Cigarettes    Smokeless tobacco: Never Used   Substance and Sexual Activity    Alcohol use: Yes     Comment: daily     Drug use: No    Sexual activity: Yes     Birth control/protection: Surgical   Lifestyle    Physical activity     Days per week: Not on file     Minutes per session: Not on file    Stress: Not on file   Relationships    Social connections     Talks on phone: Not on file     Gets together: Not on file     Attends Orthodoxy service: Not on file     Active member of club or organization: Not on file     Attends meetings of clubs or organizations: Not on file     Relationship status: Not on file   Other Topics Concern    Not on file   Social History Narrative    Not on file     Family History   Problem Relation Age of Onset    Heart attack Father     Diabetes Father      "Hypertension Father     Diabetes Mother     Hypertension Mother     Breast cancer Maternal Aunt     Colon cancer Maternal Uncle     Breast cancer Daughter     Ovarian cancer Neg Hx         Review of Systems   Constitutional: Negative for activity change, appetite change, fatigue and unexpected weight change.   HENT: Negative.    Eyes: Negative.    Respiratory: Positive for shortness of breath. Negative for apnea, cough and choking.    Cardiovascular: Negative for chest pain, palpitations and leg swelling.   Gastrointestinal: Positive for abdominal pain. Negative for abdominal distention, blood in stool and diarrhea.   Endocrine: Negative.    Genitourinary: Negative.  Negative for menstrual problem and vaginal bleeding.   Musculoskeletal: Positive for back pain.   Skin: Negative.    Allergic/Immunologic: Negative.    Neurological: Negative for seizures and syncope.   Hematological: Negative.    Psychiatric/Behavioral: The patient is nervous/anxious.      Objective:   /62 (BP Location: Right arm, Patient Position: Sitting, BP Method: Medium (Automatic))   Pulse 79   Temp 98.6 °F (37 °C) (Oral)   Ht 5' 6" (1.676 m)   Wt 84.8 kg (187 lb)   BMI 30.18 kg/m²     Physical Exam   Constitutional: She appears well-developed and well-nourished.   HENT:   Head: Normocephalic.   Eyes: No scleral icterus.   Neck: Neck supple. No tracheal deviation present.   Cardiovascular: Normal rate and regular rhythm.    Pulmonary/Chest: Breath sounds normal. No respiratory distress. She exhibits no mass, no deformity and no retraction. Right breast exhibits tenderness. Right breast exhibits no inverted nipple, no mass, no nipple discharge and no skin change. Left breast exhibits tenderness. Left breast exhibits no inverted nipple, no mass, no nipple discharge and no skin change. No breast swelling.   Abdominal: Soft. She exhibits no mass. There is no abdominal tenderness.   Musculoskeletal: No edema.   Lymphadenopathy:     " She has no cervical adenopathy.   Neurological: She is alert.   Skin: No rash noted. No erythema.     Psychiatric: She has a normal mood and affect.       Radiology review: Images personally reviewed by me in the clinic.   Mammogram: (9/04/2020) There is a 9 mm x 5 mm x 8 mm irregularly shaped, hypoechoic mass with indistinct margins seen in the right breast at 12 o'clock, 1 cm from the nipple. The mass correlates with the prior mammogram finding. Lymph nodes in the right axilla are normal.  Ultrasound: (9/04/20) There is a 9 mm x 5 mm x 8 mm irregularly shaped, hypoechoic mass with indistinct margins seen in the right breast at 12 o'clock, 1 cm from the nipple. The mass correlates with the prior mammogram finding. Lymph nodes in the right axilla are normal.    Assessment:       1. Malignant neoplasm of overlapping sites of right breast in female, estrogen receptor positive        Plan:     Options for management were discussed with the patient and her family. We reviewed the existing data noting the equivalency of breast conserving surgery with radiation therapy and mastectomy. We also reviewed the guidelines of the National Comprehensive Cancer Network for Stage 1 breast carcinoma. We discussed the need for lumpectomy margins to be negative for carcinoma, the necessity for postoperative radiation therapy after breast conservation in most cases, the possibility of a failed or false negative sentinel lymph node biopsy and the potential need for complete lymphadenectomy for a failed or positive sentinel lymph node biopsy were fully discussed. In the setting of mastectomy, delayed or immediate reconstruction options are available and were discussed.     In the setting of lumpectomy, radiation therapy would be recommended majority of the time.  The duration and treatment side effects were discussed with the patient.  This will coordinated with the radiation oncologist pending final pathology.    We also discussed the  role of systemic therapy in the treatment of early stage breast cancer.  We discussed that this is based on tumor biology and екатерина status and will be determined based on final pathology.  We discussed that if the cancer is hormone positive, endocrine therapy would be recommended in most cases and its use can reduce the risk of recurrence as well as improve survival. Side effects of treatment were briefly discussed. We also discussed the potential role for chemotherapy based on a number of factors such as tumor phenotype (ER+ vs. triple negative vs. [id number]+) and this would be determined in coordination with the medical oncologist.    The patient, in consultation with her family, has elected to proceed with bilateral total mastectomy and sentinel lymph node biopsy.  She had this decision made prior to our visit.  The operative risks of bleeding, infection, recurrence, scarring, and anesthetic complications and the possibility of requiring further surgery were all noted and informed consent obtained.  We had a long discussion about surgical risk with her comorbidities and lack of need for bilateral mastectomies in the absence of a genetic mutation.  She understands that there is no survival advantage to contralateral mastectomy.  She has extraordinary anxiety about this diagnosis and is having trouble even talking about it.  I have recommended she see onc psych to assist in coping with this.  I have reassured her about her positive prognosis moving forward.    Consult with Dr. Johnson for possible reconstruction w/ implants.    Genetic testing    Will need clearance from PCP before proceeding with surgery.    Patient was educated on breast cancer, receptors, wire localization lumpectomy, mastectomy, sentinel lymph node mapping and biopsy, axillary lymph node dissection, reconstruction, breast prosthesis with post-mastectomy bra and radiation therapy. Patient was given patient information binder including [facility] breast  cancer treatment brochure.  All her questions were answered.    Total time spent with the patient: 65 minutes.  45 minutes of face to face consultation and 20 minutes of chart review and coordination of care.

## 2020-10-06 NOTE — PROGRESS NOTES
Nurse Navigator Note:     Met with patient during her consult with Dr. Kevin.  Patient and I reviewed the information she discussed with Dr. Kevin, including treatment options, diagnosis, and future plans for workup. Patient and I went through the new patient binder, explained some of the information and why it is provided.     Also offered patient consults with our other specialty clinics: Dr. Arnold for gynecological health during treatment, our breast physical therapy department for pre-op and post-operative assessments, Dr. Jaeger for psychological support, and Noa Velasquez for nutritional counseling. Explained to patient that all of these support services are completely optional. Discussed that physical therapy may call patient to offer pre-op appt, and what that appt would entail.     Patient was given a copy of her appointments, Dr. Kevin's card, and my card. Encouraged her to call me if she has any questions or concerns or would like to schedule any additional appointments. Verbalized understanding of all information.

## 2020-10-06 NOTE — Clinical Note
She is extremely anxious about her diagnosis.  I have reassured her of her positive prognosis.  Prior to our visit, she decided she wanted bilateral mastectomies.  We are obtaining genetic testing to assess necessity.  I am urging her to be involved in all available resources to help her with coping through this diagnosis.    Thanks for sending her!  Baylee

## 2020-10-06 NOTE — PROGRESS NOTES
Breast Surgery  [facility]  Department of Surgery      REFERRING PROVIDER: Sandy Arnold MD  [address] [address]    Chief Complaint: Breast Cancer (New Patient New Right Breast Cancer Invasive Ductal Carcinoma Bx done 20 .)      Subjective:      Patient ID: Earl Abdul is a 60 y.o. female who presents with Invasive Ductal carcinoma of the R breast.    Follow-up mammogram (2020) showed there is a 9 mm x 5 mm x 8 mm irregularly shaped, hypoechoic mass with indistinct margins seen in the right breast at 12 o'clock, 1 cm from the nipple. The mass correlates with the prior mammogram finding. Lymph nodes in the right axilla are normal. A ultrasound guided biopsy was performed on 2020 with pathology revealing infiltrating ductal carcinoma of the breast.     Patient does not routinely do self breast exams.  Patient has not noted a change on breast exam.  Patient denies nipple discharge. Patient denies to previous breast biopsy. Patient denies a personal history of breast cancer.    Findings at that time were the following:   Tumor size: 0.9 cm   Tumor stgstrstastdstest:st st1st Estrogen Receptor: +   Progesterone Receptor: +   Her-2 josef: equivocal   Lymph node status: -   Lymphatic invasion: -     She is followed by hepatology at [facility].  She does not know of any significant changes over the last few years of her liver status.  She is on home oxygen at night and occasionally during the day when she sees need.  She recently underwent a sleep study but does not have the results for that just yet.    GYN History:  Age of menarche was 11. Age of menopause was 54.  Last menstrual period was 54 . Patient denies hormonal therapy. Patient is . Age of first live birth was 18. Patient did breast feed.    Past Medical History:   Diagnosis Date    Anemia     Anemia     Depression     Diverticulitis     Fatty liver     GERD (gastroesophageal reflux disease)      Hyperlipidemia     Hypertension     Pancreatitis     Peptic ulcer disease     Polysubstance abuse     Posterior reversible encephalopathy syndrome     Sarcoidosis of lung     Sarcoidosis of lung     over 30 yrs ago    Seizures     7/2017    Suicide attempt     Suicide ideation      Past Surgical History:   Procedure Laterality Date    APPENDECTOMY      COLONOSCOPY N/A 7/28/2017    Procedure: COLONOSCOPY;  Surgeon: Aaron Alvarado MD;  Location: [facility] ENDO;  Service: Endoscopy;  Laterality: N/A;    ESOPHAGOGASTRODUODENOSCOPY  10/7/2016, 11/6/2014    2016 - gastritis, duodenitis, 2014 erosive gastritis    ESOPHAGOGASTRODUODENOSCOPY N/A 2/11/2020    Procedure: ESOPHAGOGASTRODUODENOSCOPY (EGD);  Surgeon: Fawn Garrido MD;  Location: [facility] ENDO;  Service: Endoscopy;  Laterality: N/A;    FLEXIBLE SIGMOIDOSCOPY  11/06/2014    colitis    HYSTERECTOMY      INJECTION OF JOINT Right 10/10/2019    Procedure: Injection, Joint RIGHT ILIOPSOAS BURSA/TENDON INJECTION AND RIGHT GLUTEAL TENDON INJECTION WITH STEROID AND LIDOCAINE;  Surgeon: Guillaume Rico MD;  Location: [facility] PAIN MGT;  Service: Pain Management;  Laterality: Right;  NEEDS CONSENT    mediastenoscopy      TONSILLECTOMY N/A 1970    TUBAL LIGATION       Current Outpatient Medications on File Prior to Visit   Medication Sig Dispense Refill    acetaminophen (TYLENOL) 325 MG tablet Take 2 tablets (650 mg total) by mouth every 6 (six) hours as needed.  0    ARIPiprazole (ABILIFY) 2 MG Tab       atenoloL (TENORMIN) 25 MG tablet TK 1 T PO QD      dicyclomine (BENTYL) 20 mg tablet 10 mg.       dronabinol (MARINOL) 2.5 MG capsule Take 1 capsule (2.5 mg total) by mouth 2 (two) times daily before meals. 20 capsule 0    levothyroxine (SYNTHROID) 25 MCG tablet Take 1 tablet (25 mcg total) by mouth before breakfast. 30 tablet 0    omeprazole (PRILOSEC) 10 MG capsule Take 20 mg by mouth once daily.       ondansetron (ZOFRAN ODT) 8 MG TbDL Take 1 tablet (8 mg  total) by mouth 3 (three) times daily as needed (for nausea and vomiting). 30 tablet 0    rOPINIRole (REQUIP) 0.25 MG tablet       traZODone (DESYREL) 100 MG tablet TK 3 TS PO HS  1    gabapentin (NEURONTIN) 600 MG tablet       HYDROcodone-acetaminophen (NORCO)  mg per tablet       ipratropium (ATROVENT HFA) 17 mcg/actuation inhaler Inhale 2 puffs into the lungs every 6 (six) hours. Rescue for 14 days 1 Inhaler 0    predniSONE (DELTASONE) 10 MG tablet        No current facility-administered medications on file prior to visit.      Social History     Socioeconomic History    Marital status:      Spouse name: Not on file    Number of children: Not on file    Years of education: Not on file    Highest education level: Not on file   Occupational History    Not on file   Social Needs    Financial resource strain: Not on file    Food insecurity     Worry: Not on file     Inability: Not on file    Transportation needs     Medical: Not on file     Non-medical: Not on file   Tobacco Use    Smoking status: Current Every Day Smoker     Packs/day: 1.00     Years: 30.00     Pack years: 30.00     Types: Cigarettes    Smokeless tobacco: Never Used   Substance and Sexual Activity    Alcohol use: Yes     Comment: daily     Drug use: No    Sexual activity: Yes     Birth control/protection: Surgical   Lifestyle    Physical activity     Days per week: Not on file     Minutes per session: Not on file    Stress: Not on file   Relationships    Social connections     Talks on phone: Not on file     Gets together: Not on file     Attends [employer] service: Not on file     Active member of club or organization: Not on file     Attends meetings of clubs or organizations: Not on file     Relationship status: Not on file   Other Topics Concern    Not on file   Social History Narrative    Not on file     Family History   Problem Relation Age of Onset    Heart attack Father     Diabetes Father      "Hypertension Father     Diabetes Mother     Hypertension Mother     Breast cancer Maternal Aunt     Colon cancer Maternal Uncle     Breast cancer Daughter     Ovarian cancer Neg Hx         Review of Systems   Constitutional: Negative for activity change, appetite change, fatigue and unexpected weight change.   HENT: Negative.    Eyes: Negative.    Respiratory: Positive for shortness of breath. Negative for apnea, cough and choking.    Cardiovascular: Negative for chest pain, palpitations and leg swelling.   Gastrointestinal: Positive for abdominal pain. Negative for abdominal distention, blood in stool and diarrhea.   Endocrine: Negative.    Genitourinary: Negative.  Negative for menstrual problem and vaginal bleeding.   Musculoskeletal: Positive for back pain.   Skin: Negative.    Allergic/Immunologic: Negative.    Neurological: Negative for seizures and syncope.   Hematological: Negative.    Psychiatric/Behavioral: The patient is nervous/anxious.      Objective:   /62 (BP Location: Right arm, Patient Position: Sitting, BP Method: Medium (Automatic))   Pulse 79   Temp 98.6 °F (37 °C) (Oral)   Ht 5' 6" (1.676 m)   Wt 84.8 kg (187 lb)   BMI 30.18 kg/m²     Physical Exam   Constitutional: She appears well-developed and well-nourished.   HENT:   Head: Normocephalic.   Eyes: No scleral icterus.   Neck: Neck supple. No tracheal deviation present.   Cardiovascular: Normal rate and regular rhythm.    Pulmonary/Chest: Breath sounds normal. No respiratory distress. She exhibits no mass, no deformity and no retraction. Right breast exhibits tenderness. Right breast exhibits no inverted nipple, no mass, no nipple discharge and no skin change. Left breast exhibits tenderness. Left breast exhibits no inverted nipple, no mass, no nipple discharge and no skin change. No breast swelling.   Abdominal: Soft. She exhibits no mass. There is no abdominal tenderness.   Musculoskeletal: No edema.   Lymphadenopathy:     " She has no cervical adenopathy.   Neurological: She is alert.   Skin: No rash noted. No erythema.     Psychiatric: She has a normal mood and affect.       Radiology review: Images personally reviewed by me in the clinic.   Mammogram: (9/04/2020) There is a 9 mm x 5 mm x 8 mm irregularly shaped, hypoechoic mass with indistinct margins seen in the right breast at 12 o'clock, 1 cm from the nipple. The mass correlates with the prior mammogram finding. Lymph nodes in the right axilla are normal.  Ultrasound: (9/04/20) There is a 9 mm x 5 mm x 8 mm irregularly shaped, hypoechoic mass with indistinct margins seen in the right breast at 12 o'clock, 1 cm from the nipple. The mass correlates with the prior mammogram finding. Lymph nodes in the right axilla are normal.    Assessment:       1. Malignant neoplasm of overlapping sites of right breast in female, estrogen receptor positive        Plan:     Options for management were discussed with the patient and her family. We reviewed the existing data noting the equivalency of breast conserving surgery with radiation therapy and mastectomy. We also reviewed the guidelines of the National Comprehensive Cancer Network for Stage 1 breast carcinoma. We discussed the need for lumpectomy margins to be negative for carcinoma, the necessity for postoperative radiation therapy after breast conservation in most cases, the possibility of a failed or false negative sentinel lymph node biopsy and the potential need for complete lymphadenectomy for a failed or positive sentinel lymph node biopsy were fully discussed. In the setting of mastectomy, delayed or immediate reconstruction options are available and were discussed.     In the setting of lumpectomy, radiation therapy would be recommended majority of the time.  The duration and treatment side effects were discussed with the patient.  This will coordinated with the radiation oncologist pending final pathology.    We also discussed the  role of systemic therapy in the treatment of early stage breast cancer.  We discussed that this is based on tumor biology and екатерина status and will be determined based on final pathology.  We discussed that if the cancer is hormone positive, endocrine therapy would be recommended in most cases and its use can reduce the risk of recurrence as well as improve survival. Side effects of treatment were briefly discussed. We also discussed the potential role for chemotherapy based on a number of factors such as tumor phenotype (ER+ vs. triple negative vs. [id number]+) and this would be determined in coordination with the medical oncologist.    The patient, in consultation with her family, has elected to proceed with bilateral total mastectomy and sentinel lymph node biopsy.  She had this decision made prior to our visit.  The operative risks of bleeding, infection, recurrence, scarring, and anesthetic complications and the possibility of requiring further surgery were all noted and informed consent obtained.  We had a long discussion about surgical risk with her comorbidities and lack of need for bilateral mastectomies in the absence of a genetic mutation.  She understands that there is no survival advantage to contralateral mastectomy.  She has extraordinary anxiety about this diagnosis and is having trouble even talking about it.  I have recommended she see onc psych to assist in coping with this.  I have reassured her about her positive prognosis moving forward.    Consult with Dr. Johnson for possible reconstruction w/ implants.    Genetic testing    Will need clearance from PCP before proceeding with surgery.    Patient was educated on breast cancer, receptors, wire localization lumpectomy, mastectomy, sentinel lymph node mapping and biopsy, axillary lymph node dissection, reconstruction, breast prosthesis with post-mastectomy bra and radiation therapy. Patient was given patient information binder including [facility] breast  cancer treatment brochure.  All her questions were answered.    Total time spent with the patient: 65 minutes.  45 minutes of face to face consultation and 20 minutes of chart review and coordination of care.

## 2020-10-07 DIAGNOSIS — C50.811 MALIGNANT NEOPLASM OF OVERLAPPING SITES OF RIGHT BREAST IN FEMALE, ESTROGEN RECEPTOR POSITIVE: Primary | ICD-10-CM

## 2020-10-07 DIAGNOSIS — Z17.0 MALIGNANT NEOPLASM OF OVERLAPPING SITES OF RIGHT BREAST IN FEMALE, ESTROGEN RECEPTOR POSITIVE: Primary | ICD-10-CM

## 2020-10-08 ENCOUNTER — PATIENT MESSAGE (OUTPATIENT)
Dept: SURGERY | Facility: CLINIC | Age: 60
End: 2020-10-08

## 2020-10-12 ENCOUNTER — TELEPHONE (OUTPATIENT)
Dept: OBSTETRICS AND GYNECOLOGY | Facility: CLINIC | Age: 60
End: 2020-10-12

## 2020-10-12 NOTE — TELEPHONE ENCOUNTER
RN Navigator reached out to patient to discuss recent diagnosis and support available to patient via a visit with Dr. Sandy Arnold. Patient mentions she is currently under the care of a psychiatrist and counselor for assistance in coping with current anxiety. She was having trouble sleeping. Patient expresses interest in acupuncture per hx of lower back pain, intervertebral disc displacement, anxiety and generalized arthralgias. Appt confirmed for Friday 10/16/20 at 11am. CLAUDIA Pires.

## 2020-10-12 NOTE — PROGRESS NOTES
OUTPATIENT PHYSICAL THERAPY  PRE-OP EVALUATION    Name: Earl Abdul  Clinic Number: [id number]    Therapy Diagnosis:   Encounter Diagnoses   Name Primary?    Malignant neoplasm of overlapping sites of right breast in female, estrogen receptor positive     Alcoholic fatty liver Yes    At risk for lymphedema      Physician: Baylee Kevin MD    Physician Orders: PT Eval and Treat   Medical Diagnosis: right breast cancer  Evaluation Date: 10/15/2020  Authorization period Expiration: 12/31/2020  Plan of Care Certification Period: 10/15/2020  Insurance: [employer]    Visit #: 1/ Visits authorized: 1  Time In:11:00 AM  Time Out: 11:45 AM  Total Billable Time: 45 minutes    Precautions: cancer    History   History of Present Illness: Earl is a 60 y.o. female that presents to  [facility] at the [facility] secondary to dx of right breast cancer.    Dx: Right breast IDC, Grade 2, ER (+), KY (+), HER2 Equivocal  Surgery date: pending bilateral mastectomies with implant reconstruction      Pt presents today for baseline measurements to aid in the early detection of lymphedema, UE muscle testing, postural and ROM assessment along with education of risk of lymphedema and surgical precautions post surgery. Circumferential measurements will be taken today of BL UEs for early detection of lymphedema post surgery. Pt will also be instructed in exercises to perform pre and post-surgery to insure best outcomes.     Past Medical History:   Past Medical History:   Diagnosis Date    Anemia     Anemia     Depression     Diverticulitis     Fatty liver     GERD (gastroesophageal reflux disease)     Hyperlipidemia     Hypertension     Pancreatitis     Peptic ulcer disease     Polysubstance abuse     Posterior reversible encephalopathy syndrome     Sarcoidosis of lung     Sarcoidosis of lung     over 30 yrs ago    Seizures     7/2017    Suicide attempt     Suicide ideation         Past Surgical History:   Earl Abdul  has a past surgical history that includes Hysterectomy; Appendectomy; mediastenoscopy; Tubal ligation; Tonsillectomy (N/A, 1970); Esophagogastroduodenoscopy (10/7/2016, 11/6/2014); Flexible sigmoidoscopy (11/06/2014); Colonoscopy (N/A, 7/28/2017); Injection of joint (Right, 10/10/2019); and Esophagogastroduodenoscopy (N/A, 2/11/2020).    Medications:  Earl has a current medication list which includes the following prescription(s): acetaminophen, aripiprazole, atenolol, dicyclomine, dronabinol, gabapentin, hydrocodone-acetaminophen, ipratropium, levothyroxine, omeprazole, ondansetron, prednisone, ropinirole, and trazodone.    Allergies:  Review of patient's allergies indicates:   Allergen Reactions    Fentanyl Other (See Comments)     Other reaction(s): Itching    Lortab  [hydrocodone-acetaminophen] Other (See Comments)    Phenothiazines           Hand dominance: Right handed  Prior Therapy: back surgery  Nutrition:  Overweight  Social History: Lives with   Place of Residence (Steps/Adaptations): Three Story home  Current functional status:  Independent with all ADL's  Exercise routine prior to onset : None  DME owned: None  Work:  Artist                         Subjective   Pt states: both breasts with constant tenderness  Pain: 7/10 on VAS.     Objective   Mental status :oriented    Posture/Alignment   Postural examination/scapula alignment: Forward head and rounded shoulders  Joint integrity: WFLs  Skin integrity: intact  Edema: none noted    Sensation: Light Touch: Intact           Proprioception: Intact  - appearance: well groomed     ROM:   UPPER EXTREMITY--AROM/PROM  (R) UE: WNLs  (L) UE: WNLs     Shoulder Range of Motion:   ACTIVE ROM LEFT RIGHT   Flexion 170 170   Abduction 170 170   Extension 70 70   IR/90deg 80 80   ER/90deg 90 90     Strength: manual muscle test grades below   Upper Extremity Strength   (L) UE (R) UE   Shoulder flexion: 5/5 5/5  "  Shoulder Abduction: 5/5 5/5   Shoulder IR 5/5 5/5   Shoulder ER 5/5 5/5   Elbow flexion: 5/5 5/5   Elbow extension: 5/5 5/5   Lower Trap: 5/5 5/5   Middle Trap: 5/5 5/5    5/5 5/5       Baseline Measurements of BL UE's for early detection of Lymphedema:     LANDMARK RIGHT UE LEFT UE DIFFERENCE   E + 8" 35.5 cm 35 cm 0.5 cm   E + 6" 31 cm 31 cm 0 cm   E + 4" 29 cm 29 cm 0 cm   E + 2" 27.5 cm 27 cm 0.5 cm   Elbow 25.5 cm 25 cm 0.5 cm   W+ 8" 24.5 cm 24 cm 0.5 cm   W +  6" 24 cm 23.5 cm 0.5 cm   W + 4" 21.5 cm 21 cm 0.5 cm   Wrist 17 cm 17 cm 0 cm   DPC 19.5 cm 19 cm 0.5 cm   IP Thumb 6.5 cm 6.5 cm 0 cm         Coordination:   - fine motor: WFL  - UE coordination: intact     - LE coordination:  Not tested     Functional Mobility (Bed mobility, transfers)  Bed mobility: I =  independent   Roll to left: I  Roll to right: I  Supine to prone: I  Scooting to edge of bed: I  Supine to sit: I  Sit to supine: I  Transfers to bed: I  Transfers to toilet: I  Sit to stand:  I  Stand pivot:  I  Car transfers: I      ADL's:  Feeding: I = independent   Grooming: I  Hygiene: I  UB Dressing: I  LB Dressing: I  Toileting: I  Bathing: I    Gait Assessment:   - AD used: none  - Assistance: independent  - Distance: community distances       Endurance Deficit: none      Patient Education   - role of PT in multi - disciplinary team, goals for PT  - Pt was educated in lymphedema etiology and management plans.    - Pt was provided with written risk reductions and precautions for managing lymphedema.   - Reviewed ROSA ELENA drain care instructions.     ROM/lifting Precautions post surgery discussed -  until drains have been removed:  - do not lift affected arm above 90 degrees of shoulder flexion  - do not lift over 5 lbs  - do not pull or push heavy objects  - do not sleep on your stomach or surgery side     Written Home Exercises Provided and Patient Education: Handouts given   Pt was instructed in and performed therapeutic exercise for " postural correction and alignment, stretching and soft tissue mobility, and strengthening.     Exercises included: handout given    - exaggerated deep breathing and relaxation  - scapular retractions  - wrist circles  - elbow flexion/extension      Pt was able to demonstrate and report understanding and performance    Pt has no cultural, educational or language barriers to learning provided.    Functional Limitations Reporting     Category: mobility  Score: o% Limitation       Assessment   This is a 60 y.o. female referred to outpatient physical therapy and presents with a medical diagnosis of right breast cancer and was seen today pre-operatively to assess strength and ROM of BL UEs, to take baseline circumferential measurements of BL UEs to aid in the early detection of lymphedema and provide pt education on exercises/precations post breast surgery. Pt does not exhibit any ROM impairments  Pt educated in lymphedema risks/precautions as well as ROM/lifting precautions post surgery - pt demonstrated/verbalized understanding. No goals established this visit as goals for PT will be established post surgery at follow up.      Anticipated barriers to physical therapy: None     Pt's spiritual, cultural and educational needs considered and pt agreeable to plan of care and goals as stated below:     Medical necessity is demonstrated by the following IMPAIRMENTS/PROMBLEM LIST:  History  Co-morbidities and personal factors that may impact the plan of care Co-morbidities:   depression, history of cancer, HTN and Sacoidosis of lung    Personal Factors:   no deficits     moderate   Examination  Body Structures and Functions, activity limitations and participation restrictions that may impact the plan of care Body Regions:   No Deficits    Body Systems:    No Deficits    Participation Restrictions:   No Deficits    Activity limitations:   Learning and applying knowledge  no deficits    General Tasks and Commands  no  deficits    Communication  no deficits    Mobility  no deficits    Self care  no deficits    Domestic Life  no deficits    Interactions/Relationships  no deficits    Life Areas  no deficits    Community and Social Life  no deficits         low   Clinical Presentation stable and uncomplicated low   Decision Making/ Complexity Score: low           Plan   Schedule patient for follow up with Physical therapy post surgery. Goals for therapy post surgery will be established at that time.     Therapist: Amanda Tejeda, PT  10/15/2020

## 2020-10-14 ENCOUNTER — OFFICE VISIT (OUTPATIENT)
Dept: PLASTIC SURGERY | Facility: CLINIC | Age: 60
End: 2020-10-14
Payer: COMMERCIAL

## 2020-10-14 VITALS — DIASTOLIC BLOOD PRESSURE: 80 MMHG | HEART RATE: 84 BPM | SYSTOLIC BLOOD PRESSURE: 150 MMHG

## 2020-10-14 DIAGNOSIS — Z17.0 MALIGNANT NEOPLASM OF OVERLAPPING SITES OF RIGHT BREAST IN FEMALE, ESTROGEN RECEPTOR POSITIVE: Primary | ICD-10-CM

## 2020-10-14 DIAGNOSIS — C50.811 MALIGNANT NEOPLASM OF OVERLAPPING SITES OF RIGHT BREAST IN FEMALE, ESTROGEN RECEPTOR POSITIVE: Primary | ICD-10-CM

## 2020-10-14 PROCEDURE — 3077F SYST BP >= 140 MM HG: CPT | Mod: CPTII,S$GLB,, | Performed by: SURGERY

## 2020-10-14 PROCEDURE — 3079F DIAST BP 80-89 MM HG: CPT | Mod: CPTII,S$GLB,, | Performed by: SURGERY

## 2020-10-14 PROCEDURE — 99999 PR PBB SHADOW E&M-EST. PATIENT-LVL III: CPT | Mod: PBBFAC,,, | Performed by: SURGERY

## 2020-10-14 PROCEDURE — 3077F PR MOST RECENT SYSTOLIC BLOOD PRESSURE >= 140 MM HG: ICD-10-PCS | Mod: CPTII,S$GLB,, | Performed by: SURGERY

## 2020-10-14 PROCEDURE — 3079F PR MOST RECENT DIASTOLIC BLOOD PRESSURE 80-89 MM HG: ICD-10-PCS | Mod: CPTII,S$GLB,, | Performed by: SURGERY

## 2020-10-14 PROCEDURE — 99999 PR PBB SHADOW E&M-EST. PATIENT-LVL III: ICD-10-PCS | Mod: PBBFAC,,, | Performed by: SURGERY

## 2020-10-14 PROCEDURE — 99203 PR OFFICE/OUTPT VISIT, NEW, LEVL III, 30-44 MIN: ICD-10-PCS | Mod: S$GLB,,, | Performed by: SURGERY

## 2020-10-14 PROCEDURE — 99203 OFFICE O/P NEW LOW 30 MIN: CPT | Mod: S$GLB,,, | Performed by: SURGERY

## 2020-10-14 NOTE — PROGRESS NOTES
History & Physical    SUBJECTIVE:   Chief complaint: right breast cancer    History of Present Illness:  60 y.o. female who presents with right breast invasive ductal carcinoma planning to have bilateral mastectomy with sentinel node biopsy who was referred by breast surgery for reconstructive eval. She reports hx of sarcoidiosis. She reports would like to have reconstruction with a c-cup sized breast. She uses 3L of home oxygen at night. She is a smoker, 1/2 a pack a day.     Tumor characteristics  Tumor size: 0.9 cm   Tumor stgstrstastdstest:st st1st Estrogen Receptor: +   Progesterone Receptor: +   Her-2 josef: equivocal   Lymph node status: -   Lymphatic invasion: -         Past Medical History:   Diagnosis Date    Anemia     Anemia     Depression     Diverticulitis     Fatty liver     GERD (gastroesophageal reflux disease)     Hyperlipidemia     Hypertension     Pancreatitis     Peptic ulcer disease     Polysubstance abuse     Posterior reversible encephalopathy syndrome     Sarcoidosis of lung     Sarcoidosis of lung     over 30 yrs ago    Seizures     7/2017    Suicide attempt     Suicide ideation        Past Surgical History:   Procedure Laterality Date    APPENDECTOMY      COLONOSCOPY N/A 7/28/2017    Procedure: COLONOSCOPY;  Surgeon: Aaron Alvarado MD;  Location: [facility];  Service: Endoscopy;  Laterality: N/A;    ESOPHAGOGASTRODUODENOSCOPY  10/7/2016, 11/6/2014    2016 - gastritis, duodenitis, 2014 erosive gastritis    ESOPHAGOGASTRODUODENOSCOPY N/A 2/11/2020    Procedure: ESOPHAGOGASTRODUODENOSCOPY (EGD);  Surgeon: Fawn Garrido MD;  Location: [facility];  Service: Endoscopy;  Laterality: N/A;    FLEXIBLE SIGMOIDOSCOPY  11/06/2014    colitis    HYSTERECTOMY      INJECTION OF JOINT Right 10/10/2019    Procedure: Injection, Joint RIGHT ILIOPSOAS BURSA/TENDON INJECTION AND RIGHT GLUTEAL TENDON INJECTION WITH STEROID AND LIDOCAINE;  Surgeon: Guillaume Rico MD;  Location: [facility];  Service:  Pain Management;  Laterality: Right;  NEEDS CONSENT    mediastenoscopy      TONSILLECTOMY N/A 1970    TUBAL LIGATION         Family History   Problem Relation Age of Onset    Heart attack Father     Diabetes Father     Hypertension Father     Diabetes Mother     Hypertension Mother     Breast cancer Maternal Aunt     Colon cancer Maternal Uncle     Breast cancer Daughter     Ovarian cancer Neg Hx        Social History     Socioeconomic History    Marital status:      Spouse name: Not on file    Number of children: Not on file    Years of education: Not on file    Highest education level: Not on file   Occupational History    Not on file   Social Needs    Financial resource strain: Not on file    Food insecurity     Worry: Not on file     Inability: Not on file    Transportation needs     Medical: Not on file     Non-medical: Not on file   Tobacco Use    Smoking status: Current Every Day Smoker     Packs/day: 1.00     Years: 30.00     Pack years: 30.00     Types: Cigarettes    Smokeless tobacco: Never Used   Substance and Sexual Activity    Alcohol use: Yes     Comment: daily     Drug use: No    Sexual activity: Yes     Birth control/protection: Surgical   Lifestyle    Physical activity     Days per week: Not on file     Minutes per session: Not on file    Stress: Not on file   Relationships    Social connections     Talks on phone: Not on file     Gets together: Not on file     Attends Spiritism service: Not on file     Active member of club or organization: Not on file     Attends meetings of clubs or organizations: Not on file     Relationship status: Not on file   Other Topics Concern    Not on file   Social History Narrative    Not on file       Current Outpatient Medications   Medication Sig Dispense Refill    acetaminophen (TYLENOL) 325 MG tablet Take 2 tablets (650 mg total) by mouth every 6 (six) hours as needed.  0    ARIPiprazole (ABILIFY) 2 MG Tab       atenoloL  (TENORMIN) 25 MG tablet TK 1 T PO QD      dicyclomine (BENTYL) 20 mg tablet 10 mg.       dronabinol (MARINOL) 2.5 MG capsule Take 1 capsule (2.5 mg total) by mouth 2 (two) times daily before meals. 20 capsule 0    gabapentin (NEURONTIN) 600 MG tablet       HYDROcodone-acetaminophen (NORCO)  mg per tablet       ipratropium (ATROVENT HFA) 17 mcg/actuation inhaler Inhale 2 puffs into the lungs every 6 (six) hours. Rescue for 14 days 1 Inhaler 0    levothyroxine (SYNTHROID) 25 MCG tablet Take 1 tablet (25 mcg total) by mouth before breakfast. 30 tablet 0    omeprazole (PRILOSEC) 10 MG capsule Take 20 mg by mouth once daily.       ondansetron (ZOFRAN ODT) 8 MG TbDL Take 1 tablet (8 mg total) by mouth 3 (three) times daily as needed (for nausea and vomiting). 30 tablet 0    predniSONE (DELTASONE) 10 MG tablet       rOPINIRole (REQUIP) 0.25 MG tablet       traZODone (DESYREL) 100 MG tablet TK 3 TS PO HS  1     No current facility-administered medications for this visit.        Review of patient's allergies indicates:   Allergen Reactions    Fentanyl Other (See Comments)     Other reaction(s): Itching    Lortab  [hydrocodone-acetaminophen] Other (See Comments)    Phenothiazines          Review of Systems:  Review of systems negative except as pertinent positive and negatives  listed above      OBJECTIVE:     BP (!) 150/80   Pulse 84       Physical Exam:  Gen: NAD, Aox3  Neuro: no focal deficits  HEENT: NCAT, neck supple  CV: RRR  Pulm: Breathing non-labored, chest wall movement equal bilaterally  Breast: pendolous breasts grade 3 ptosis  Abdomen: soft, nontender, no guarding  Gu: no rashes or wounds  Extremity:normal strength, no cyanosis or edema  Psych: normal mood and affect          ASSESSMENT/PLAN:     60 y.o. female who presents with right breast invasive ductal carcinoma planning to have bilateral mastectomy with sentinel node biopsy who was referred by breast surgery for reconstructive  tamiko.    -Given patients multiple co-morbidities, breast reconstruction with bilateral tissue expanders will be the safest option  -Discussed risks, benefits and alternatives with the patient  -She will like to proceed with breast reconstruction    Adolfo Sweet Plastic Surgery Fellow

## 2020-10-14 NOTE — LETTER
[facility]  [address]  [facility] [address]  Phone: [phone]  Fax: [phone] October 23, 2020      Baylee Kevin MD  [address]  [facility] [address]    Patient: Earl Abdul   MR Number: [id number]   YOB: 1960   Date of Visit: 10/14/2020     Dear Dr. Baylee Kevin:    Thank you for referring Earl Abdul to me for evaluation. Attached you will find relevant portions of my assessment and plan of care.    Ms. Abdul is a 60-year-old female who presents with a right breast invasive ductal carcinoma planning to have bilateral mastectomy with sentinel node biopsy who was referred by breast surgery for reconstructive evaluation.     Given patients multiple co-morbidities, breast reconstruction with bilateral tissue expanders will be the safest option. Discussed risks, benefits and alternatives with the patient. She will like to proceed with breast reconstruction.    If you have questions, please do not hesitate to call me. I look forward to following Earl Abdul along with you.    Sincerely,    Scottie Johnson MD  Section of Plastic Surgery  Department of Surgery  [facility]     CRB/hcr    CC:  Izzy Gracia MD

## 2020-10-15 ENCOUNTER — CLINICAL SUPPORT (OUTPATIENT)
Dept: REHABILITATION | Facility: HOSPITAL | Age: 60
End: 2020-10-15
Attending: SURGERY
Payer: COMMERCIAL

## 2020-10-15 DIAGNOSIS — Z91.89 AT RISK FOR LYMPHEDEMA: ICD-10-CM

## 2020-10-15 DIAGNOSIS — C50.811 MALIGNANT NEOPLASM OF OVERLAPPING SITES OF RIGHT BREAST IN FEMALE, ESTROGEN RECEPTOR POSITIVE: ICD-10-CM

## 2020-10-15 DIAGNOSIS — K70.0 ALCOHOLIC FATTY LIVER: Primary | Chronic | ICD-10-CM

## 2020-10-15 DIAGNOSIS — Z17.0 MALIGNANT NEOPLASM OF OVERLAPPING SITES OF RIGHT BREAST IN FEMALE, ESTROGEN RECEPTOR POSITIVE: ICD-10-CM

## 2020-10-15 PROCEDURE — 97161 PT EVAL LOW COMPLEX 20 MIN: CPT | Performed by: PHYSICAL MEDICINE & REHABILITATION

## 2020-10-15 NOTE — PATIENT INSTRUCTIONS
PRE/POST OP PATIENT EDUCATION    Post Operative Instructions     Range of Motion/lifting Precautions post surgery  The following activities should be avoided until your drain(s) have been removed  - do not lift affected arm above 90 degrees of shoulder flexion  - do not lift over 5 lbs  - do not pull or push heavy objects  - do not sleep on your stomach or surgery side       After surgery, you may begin self-care tasks including grooming, dressing, feeding and simple hygiene as soon as you feel up to it.    Schedule your post-op therapy follow-up after your drains have been removed     When to call your doctor   - if any part of your affected arm or axilla feels hot, is reddened or has increased swelling   - if you develop a temperature over 101 degrees Fahrenheit      Lymphedema - Identification and Prevention     Lymphedema - is the swelling of a body area or extremity caused by the accumulation of lymphatic fluid.  There is a risk for lymphedema with the removal of lymph nodes, trauma or radiation therapy.  Treatment of breast cancer often involves surgery: mastectomy or lumpectomy. Some of the lymph nodes in the underarm (called axillary lymph nodes) may be removed and checked to see if they contain cancer cells.     During breast surgery when axillary lymph nodes are removed (with sentinel node biopsy or axillary dissection) or are treated with radiation therapy, the lymphatic system may become impaired. This may prevent lymphatic fluid from leaving the area therefore, causing lymphedema.     Lymphatic fluid is a normal part of the circulatory system. Its function is to remove waste products and to produce cells vital to fighting infection. Swelling occurs when the vessels become restricted and the lymphatic fluid is unable to freely flow through them.  If lymphedema is left untreated, the affected limb could progressively become more swollen, which could lead to hardening  of the skin, bulkiness in the limb, infection and impaired wound healing.         There are things you can do to decrease the chance of developing lymphedema.                                          [email or web address]                                                                                                                                                  The information presented is intended for general information and educational purposes. It is not intended to replace the  advice of your health care provider. Contact your health care provider if you believe you have a health problem.                                                    POST OP EXERCISES - SAFE TO DO THE FIRST 2 WEEKS AFTER SURGERY UNTIL YOUR FOLLOW UP APPOINTMENT WITH PHYSICAL/OCCUPATIONAL THERAPY    Scapular Retraction (Standing)    With arms at sides, squeeze shoulder blades together. Do not shrug shoulders and do not hold your breath. Hold 5 seconds. Repeat 10 times 1 sessions 1-2 x day.       Exaggerated Breathing and Relaxation      Practice deep breathing frequently in the first few days following surgery even before you begin exercising. This exercise helps with tissue extensibility in the chest wall.  Inhale slowly and deeply through the nose and exhale through pursed lips. Concentrate on relaxing as you let the air out of your lungs. Repeat three (3) to four (4) times, remembering to breath in deeply and then relaxing. This exercise helps to ease the sensation of pulling and discomfort that may be experienced while exercising.      Ball Squeeze OR Hand pumps       Perform this exercise three (3) times a day for 1-3 minutes each time.    The ball squeeze or hand pumps helps to prevent or reduce temporary swelling that may occur in the affected arm. This exercise may be performed standing, sitting or while lying in bed. During this exercise the affected arm should be slightly bent and held upward. Support your arm with a  pillow if you are uncomfortable holding it up.          AROM: Elbow Flexion / Extension        With left hand palm up, gently bend elbow as far as possible. Then straighten arm as far as possible. Do this in standing.   Repeat 10 times per set. Do1 sets per session. Do 1-3 sessions per day.

## 2020-10-18 ENCOUNTER — PATIENT MESSAGE (OUTPATIENT)
Dept: SLEEP MEDICINE | Facility: CLINIC | Age: 60
End: 2020-10-18

## 2020-10-18 ENCOUNTER — PATIENT MESSAGE (OUTPATIENT)
Dept: SURGERY | Facility: CLINIC | Age: 60
End: 2020-10-18

## 2020-10-19 ENCOUNTER — PATIENT MESSAGE (OUTPATIENT)
Dept: PLASTIC SURGERY | Facility: CLINIC | Age: 60
End: 2020-10-19

## 2020-10-19 DIAGNOSIS — Z17.0 MALIGNANT NEOPLASM OF OVERLAPPING SITES OF RIGHT BREAST IN FEMALE, ESTROGEN RECEPTOR POSITIVE: Primary | ICD-10-CM

## 2020-10-19 DIAGNOSIS — C50.811 MALIGNANT NEOPLASM OF OVERLAPPING SITES OF RIGHT BREAST IN FEMALE, ESTROGEN RECEPTOR POSITIVE: Primary | ICD-10-CM

## 2020-10-19 NOTE — TELEPHONE ENCOUNTER
"Called patient and reviewed study results and treatment options.     Patient should be advised to not sleep in the supine position by use of a "snore ball" or similar method.   Anesthetic and sedative risks of VINICIO discussed.       "

## 2020-10-21 DIAGNOSIS — Z17.0 MALIGNANT NEOPLASM OF OVERLAPPING SITES OF RIGHT BREAST IN FEMALE, ESTROGEN RECEPTOR POSITIVE: Primary | ICD-10-CM

## 2020-10-21 DIAGNOSIS — Z01.818 PRE-OP TESTING: ICD-10-CM

## 2020-10-21 DIAGNOSIS — C50.811 MALIGNANT NEOPLASM OF OVERLAPPING SITES OF RIGHT BREAST IN FEMALE, ESTROGEN RECEPTOR POSITIVE: Primary | ICD-10-CM

## 2020-10-26 ENCOUNTER — LAB VISIT (OUTPATIENT)
Dept: SURGERY | Facility: CLINIC | Age: 60
End: 2020-10-26
Payer: COMMERCIAL

## 2020-10-26 ENCOUNTER — LAB VISIT (OUTPATIENT)
Dept: LAB | Facility: HOSPITAL | Age: 60
End: 2020-10-26
Attending: SURGERY
Payer: COMMERCIAL

## 2020-10-26 ENCOUNTER — HOSPITAL ENCOUNTER (OUTPATIENT)
Dept: CARDIOLOGY | Facility: CLINIC | Age: 60
Discharge: HOME | End: 2020-10-26
Payer: COMMERCIAL

## 2020-10-26 DIAGNOSIS — Z01.818 PRE-OP TESTING: ICD-10-CM

## 2020-10-26 LAB
ALBUMIN SERPL BCP-MCNC: 3.7 G/DL (ref 3.5–5.2)
ALP SERPL-CCNC: 60 U/L (ref 55–135)
ALT SERPL W/O P-5'-P-CCNC: 38 U/L (ref 10–44)
ANION GAP SERPL CALC-SCNC: 8 MMOL/L (ref 8–16)
ANISOCYTOSIS BLD QL SMEAR: SLIGHT
AST SERPL-CCNC: 49 U/L (ref 10–40)
BASOPHILS # BLD AUTO: 0.02 K/UL (ref 0–0.2)
BASOPHILS NFR BLD: 0.4 % (ref 0–1.9)
BILIRUB SERPL-MCNC: 0.5 MG/DL (ref 0.1–1)
BUN SERPL-MCNC: 16 MG/DL (ref 8–23)
CALCIUM SERPL-MCNC: 9 MG/DL (ref 8.7–10.5)
CHLORIDE SERPL-SCNC: 102 MMOL/L (ref 95–110)
CO2 SERPL-SCNC: 27 MMOL/L (ref 23–29)
CREAT SERPL-MCNC: 0.8 MG/DL (ref 0.5–1.4)
DIFFERENTIAL METHOD: ABNORMAL
EOSINOPHIL # BLD AUTO: 0.1 K/UL (ref 0–0.5)
EOSINOPHIL NFR BLD: 1.4 % (ref 0–8)
ERYTHROCYTE [DISTWIDTH] IN BLOOD BY AUTOMATED COUNT: 15.8 % (ref 11.5–14.5)
EST. GFR  (AFRICAN AMERICAN): >60 ML/MIN/1.73 M^2
EST. GFR  (NON AFRICAN AMERICAN): >60 ML/MIN/1.73 M^2
GLUCOSE SERPL-MCNC: 86 MG/DL (ref 70–110)
HCT VFR BLD AUTO: 36.5 % (ref 37–48.5)
HGB BLD-MCNC: 10.6 G/DL (ref 12–16)
IMM GRANULOCYTES # BLD AUTO: 0.01 K/UL (ref 0–0.04)
IMM GRANULOCYTES NFR BLD AUTO: 0.2 % (ref 0–0.5)
LYMPHOCYTES # BLD AUTO: 1.8 K/UL (ref 1–4.8)
LYMPHOCYTES NFR BLD: 37 % (ref 18–48)
MCH RBC QN AUTO: 25.3 PG (ref 27–31)
MCHC RBC AUTO-ENTMCNC: 29 G/DL (ref 32–36)
MCV RBC AUTO: 87 FL (ref 82–98)
MONOCYTES # BLD AUTO: 0.6 K/UL (ref 0.3–1)
MONOCYTES NFR BLD: 13.2 % (ref 4–15)
NEUTROPHILS # BLD AUTO: 2.3 K/UL (ref 1.8–7.7)
NEUTROPHILS NFR BLD: 47.8 % (ref 38–73)
NRBC BLD-RTO: 0 /100 WBC
OVALOCYTES BLD QL SMEAR: ABNORMAL
PLATELET # BLD AUTO: 136 K/UL (ref 150–350)
PLATELET BLD QL SMEAR: ABNORMAL
PMV BLD AUTO: 10.1 FL (ref 9.2–12.9)
POIKILOCYTOSIS BLD QL SMEAR: SLIGHT
POLYCHROMASIA BLD QL SMEAR: ABNORMAL
POTASSIUM SERPL-SCNC: 4.6 MMOL/L (ref 3.5–5.1)
PROT SERPL-MCNC: 6.9 G/DL (ref 6–8.4)
RBC # BLD AUTO: 4.19 M/UL (ref 4–5.4)
SARS-COV-2 RNA RESP QL NAA+PROBE: NOT DETECTED
SODIUM SERPL-SCNC: 137 MMOL/L (ref 136–145)
WBC # BLD AUTO: 4.86 K/UL (ref 3.9–12.7)

## 2020-10-26 PROCEDURE — 36415 COLL VENOUS BLD VENIPUNCTURE: CPT

## 2020-10-26 PROCEDURE — U0003 INFECTIOUS AGENT DETECTION BY NUCLEIC ACID (DNA OR RNA); SEVERE ACUTE RESPIRATORY SYNDROME CORONAVIRUS 2 (SARS-COV-2) (CORONAVIRUS DISEASE [COVID-19]), AMPLIFIED PROBE TECHNIQUE, MAKING USE OF HIGH THROUGHPUT TECHNOLOGIES AS DESCRIBED BY CMS-2020-01-R: HCPCS

## 2020-10-26 PROCEDURE — 80053 COMPREHEN METABOLIC PANEL: CPT

## 2020-10-26 PROCEDURE — 85025 COMPLETE CBC W/AUTO DIFF WBC: CPT

## 2020-10-27 ENCOUNTER — TELEPHONE (OUTPATIENT)
Dept: SURGERY | Facility: CLINIC | Age: 60
End: 2020-10-27

## 2020-10-27 ENCOUNTER — HOSPITAL ENCOUNTER (OUTPATIENT)
Dept: RADIOLOGY | Facility: OTHER | Age: 60
Discharge: HOME | End: 2020-10-27
Attending: ORTHOPAEDIC SURGERY
Payer: COMMERCIAL

## 2020-10-27 DIAGNOSIS — M48.062 SPINAL STENOSIS, LUMBAR REGION WITH NEUROGENIC CLAUDICATION: ICD-10-CM

## 2020-10-27 DIAGNOSIS — M51.36 OTHER INTERVERTEBRAL DISC DEGENERATION, LUMBAR REGION: ICD-10-CM

## 2020-10-27 DIAGNOSIS — M41.86 OTHER FORMS OF SCOLIOSIS, LUMBAR REGION: ICD-10-CM

## 2020-10-27 DIAGNOSIS — M54.41 LUMBAGO WITH SCIATICA, RIGHT SIDE: ICD-10-CM

## 2020-10-27 DIAGNOSIS — M51.26 OTHER INTERVERTEBRAL DISC DISPLACEMENT, LUMBAR REGION: ICD-10-CM

## 2020-10-27 DIAGNOSIS — M48.061 SPINAL STENOSIS, LUMBAR REGION WITHOUT NEUROGENIC CLAUDICATION: ICD-10-CM

## 2020-10-27 PROCEDURE — 25500020 PHARM REV CODE 255: Performed by: ORTHOPAEDIC SURGERY

## 2020-10-27 PROCEDURE — 72158 MRI LUMBAR SPINE W/O & W/DYE: CPT | Mod: TC

## 2020-10-27 PROCEDURE — A9585 GADOBUTROL INJECTION: HCPCS | Performed by: ORTHOPAEDIC SURGERY

## 2020-10-27 PROCEDURE — 72158 MRI LUMBAR SPINE W WO CONTRAST: ICD-10-PCS | Mod: 26,,, | Performed by: RADIOLOGY

## 2020-10-27 PROCEDURE — 72158 MRI LUMBAR SPINE W/O & W/DYE: CPT | Mod: 26,,, | Performed by: RADIOLOGY

## 2020-10-27 RX ORDER — GADOBUTROL 604.72 MG/ML
8.5 INJECTION INTRAVENOUS
Status: COMPLETED | OUTPATIENT
Start: 2020-10-27 | End: 2020-10-27

## 2020-10-27 RX ADMIN — GADOBUTROL 8.5 ML: 604.72 INJECTION INTRAVENOUS at 01:10

## 2020-10-27 NOTE — TELEPHONE ENCOUNTER
Called patient and notified that genetic testing results were negative. Answered all questions. Advised that we will mail her a copy of these results once we receive the hard copy.

## 2020-10-28 ENCOUNTER — ANESTHESIA EVENT (OUTPATIENT)
Dept: SURGERY | Facility: HOSPITAL | Age: 60
End: 2020-10-28
Payer: COMMERCIAL

## 2020-10-28 ENCOUNTER — TELEPHONE (OUTPATIENT)
Dept: SURGERY | Facility: CLINIC | Age: 60
End: 2020-10-28

## 2020-10-28 NOTE — ANESTHESIA PREPROCEDURE EVALUATION
[facility]  Anesthesia Pre-Operative Evaluation         Patient Name: Earl Abdul  YOB: 1960  MRN: [id number]    SUBJECTIVE:     Pre-operative evaluation for Procedure(s) (LRB):  MASTECTOMY, BILATERAL (Bilateral)  BIOPSY, LYMPH NODE, SENTINEL (Right)  INJECTION, FOR SENTINEL NODE IDENTIFICATION (Right)  LYMPHADENECTOMY, AXILLARY (Right)  INSERTION, TISSUE EXPANDER, BREAST (Bilateral)     10/28/2020    Earl Abdul is a 60 y.o. female w/ a significant PMHx of ETOH abuse with ETOH cirrhosis, GERD, HTN, HLD, obesity, VINICIO, current smoker, sarcoidosis of lung (3L of O2 at home).  Patient was recently seen by Dr. Johnson on 10/23/20 for evaluation of right breast invasive ductal carcinoma.  Plan for the above procedure    Patient now presents for the above procedure(s).      LDA: None documented.       Prev airway:   02/11/20; Method of Intubation: Video Laryngoscopy, Wong, Rapid Sequence Induction; Inserted by: CRNA; Airway Device: Endotracheal Tube; Mask Ventilation: Not Attempted; Intubated: Postinduction; Blade: Other (see comments) (Wong #3); Airway Device Size: 7.0; Style: Cuffed; Cuff Inflation: Minimal occlusive pressure; Placement Verified By: Capnometry, Auscultation, ETT Condensation; Grade: Grade I (per Wong); Complicating Factors: None; Intubation Complications: None    Drips: None documented.      Patient Active Problem List   Diagnosis    Depression    Alcohol dependence, continuous    Alcohol withdrawal    Essential hypertension    Sarcoidosis of lung    Fibromyalgia    Tobacco abuse    Alcoholic fatty liver    Cannabis abuse, in remission    Nonspecific abnormal results of liver function study    Sarcoidosis    History of Clostridium difficile colitis    Diarrhea    Dehydration    History of substance abuse    Elevated liver enzymes    Ascites    Cirrhosis    New onset seizure    Posterior reversible encephalopathy syndrome     Depression with anxiety    Erythema nodosum    History of sarcoidosis    Hyperlipidemia    Ramírez's syndrome    Degenerative joint disease (DJD) of lumbar spine    Decreased ROM of lumbar spine    Difficulty walking    Transaminitis    VINICIO (obstructive sleep apnea)    Malignant neoplasm of overlapping sites of right breast in female, estrogen receptor positive    At risk for lymphedema       Review of patient's allergies indicates:   Allergen Reactions    Fentanyl Other (See Comments)     Other reaction(s): Itching    Lortab  [hydrocodone-acetaminophen] Other (See Comments)    Phenothiazines        Current Inpatient Medications:      No current facility-administered medications on file prior to encounter.      Current Outpatient Medications on File Prior to Encounter   Medication Sig Dispense Refill    valsartan (DIOVAN) 20 MG tablet Take 20 mg by mouth once daily.      acetaminophen (TYLENOL) 325 MG tablet Take 2 tablets (650 mg total) by mouth every 6 (six) hours as needed.  0    ARIPiprazole (ABILIFY) 2 MG Tab 2 mg every evening.       dronabinol (MARINOL) 2.5 MG capsule Take 1 capsule (2.5 mg total) by mouth 2 (two) times daily before meals. 20 capsule 0    gabapentin (NEURONTIN) 600 MG tablet Take 600 mg by mouth 3 (three) times daily.       ipratropium (ATROVENT HFA) 17 mcg/actuation inhaler Inhale 2 puffs into the lungs every 6 (six) hours. Rescue for 14 days 1 Inhaler 0    levothyroxine (SYNTHROID) 25 MCG tablet Take 1 tablet (25 mcg total) by mouth before breakfast. 30 tablet 0    omeprazole (PRILOSEC) 20 MG capsule Take 60 mg by mouth once daily.       ondansetron (ZOFRAN ODT) 8 MG TbDL Take 1 tablet (8 mg total) by mouth 3 (three) times daily as needed (for nausea and vomiting). 30 tablet 0    rOPINIRole (REQUIP) 0.25 MG tablet every evening.       traZODone (DESYREL) 100 MG tablet TK 3 TS PO HS  1       Past Surgical History:   Procedure Laterality Date    APPENDECTOMY       COLONOSCOPY N/A 7/28/2017    Procedure: COLONOSCOPY;  Surgeon: Aaron Alvarado MD;  Location: [facility];  Service: Endoscopy;  Laterality: N/A;    ESOPHAGOGASTRODUODENOSCOPY  10/7/2016, 11/6/2014    2016 - gastritis, duodenitis, 2014 erosive gastritis    ESOPHAGOGASTRODUODENOSCOPY N/A 2/11/2020    Procedure: ESOPHAGOGASTRODUODENOSCOPY (EGD);  Surgeon: Fawn Garrido MD;  Location: [facility] END[facility];  Service: Endoscopy;  Laterality: N/A;    FLEXIBLE SIGMOIDOSCOPY  11/06/2014    colitis    HYSTERECTOMY      INJECTION OF JOINT Right 10/10/2019    Procedure: Injection, Joint RIGHT ILIOPSOAS BURSA/TENDON INJECTION AND RIGHT GLUTEAL TENDON INJECTION WITH STEROID AND LIDOCAINE;  Surgeon: Guillaume Rico MD;  Location: [facility];  Service: Pain Management;  Laterality: Right;  NEEDS CONSENT    mediastenoscopy      TONSILLECTOMY N/A 1970    TUBAL LIGATION         Social History     Socioeconomic History    Marital status:      Spouse name: Not on file    Number of children: Not on file    Years of education: Not on file    Highest education level: Not on file   Occupational History    Not on file   Social Needs    Financial resource strain: Not on file    Food insecurity     Worry: Not on file     Inability: Not on file    Transportation needs     Medical: Not on file     Non-medical: Not on file   Tobacco Use    Smoking status: Current Every Day Smoker     Packs/day: 0.50     Years: 30.00     Pack years: 15.00     Types: Cigarettes    Smokeless tobacco: Never Used   Substance and Sexual Activity    Alcohol use: Not Currently     Comment: daily     Drug use: No    Sexual activity: Yes     Birth control/protection: Surgical   Lifestyle    Physical activity     Days per week: Not on file     Minutes per session: Not on file    Stress: Not on file   Relationships    Social connections     Talks on phone: Not on file     Gets together: Not on file     Attends Jewish service: Not on file     Active  member of club or organization: Not on file     Attends meetings of clubs or organizations: Not on file     Relationship status: Not on file   Other Topics Concern    Not on file   Social History Narrative    Not on file       OBJECTIVE:     Vital Signs Range (Last 24H):         Significant Labs:  Lab Results   Component Value Date    WBC 4.86 10/26/2020    HGB 10.6 (L) 10/26/2020    HCT 36.5 (L) 10/26/2020     (L) 10/26/2020    CHOL 194 07/19/2017    TRIG 160 (H) 07/19/2017    HDL 31 (L) 07/19/2017    ALT 38 10/26/2020    AST 49 (H) 10/26/2020     10/26/2020    K 4.6 10/26/2020     10/26/2020    CREATININE 0.8 10/26/2020    BUN 16 10/26/2020    CO2 27 10/26/2020    TSH 1.950 06/16/2020    INR 0.9 10/27/2019    HGBA1C 4.4 06/22/2017       Diagnostic Studies: No relevant studies.    EKG:   Results for orders placed or performed in visit on 10/26/20   EKG 12-lead    Collection Time: 10/26/20 12:18 PM    Narrative    Test Reason : R07.9    Vent. Rate : 069 BPM     Atrial Rate : 069 BPM     P-R Int : 148 ms          QRS Dur : 088 ms      QT Int : 398 ms       P-R-T Axes : 058 055 033 degrees     QTc Int : 426 ms    Normal sinus rhythm  Normal ECG  When compared with ECG of 10-FEB-2020 09:43,  T wave inversion no longer evident in Inferior leads  T wave inversion no longer evident in Lateral leads  QT has shortened  Confirmed by ROSEANN PARK MD ([id number]) on 10/28/2020 12:33:44 PM    Referred By: VINNIE TORRES           Confirmed By:ROSEANN PARK MD       ECHOCARDIOGRAM:  TTE:  No results found for this or any previous visit.    ASSESSMENT/PLAN:         Anesthesia Evaluation    I have reviewed the Patient Summary Reports.    I have reviewed the Nursing Notes. I have reviewed the NPO Status.   I have reviewed the Medications.     Review of Systems  Anesthesia Hx:  No problems with previous Anesthesia  History of prior surgery of interest to airway management or planning: Previous anesthesia: General EGD with  general anesthesia.  Denies Family Hx of Anesthesia complications.   Denies Personal Hx of Anesthesia complications.   Social:  Smoker, Alcohol Use    Hematology/Oncology:         -- Anemia: Current/Recent Cancer. Breast   EENT/Dental:EENT/Dental Normal   Cardiovascular:   Hypertension Denies MI.  Denies CAD.     Denies Angina. hyperlipidemia    Pulmonary:   Sleep Apnea    Hepatic/GI:   PUD, GERD Liver Disease,    Musculoskeletal:   Arthritis     Neurological:   Neuromuscular Disease,    Psych:   Psychiatric History anxiety depression          Physical Exam  General:  Well nourished    Airway/Jaw/Neck:  Airway Findings: Mouth Opening: Normal Tongue: Normal  General Airway Assessment: Adult  Mallampati: II  TM Distance: Normal, at least 6 cm     Eyes/Ears/Nose:  EYES/EARS/NOSE FINDINGS: Normal   Dental:  Dental Findings: In tact   Chest/Lungs:  Chest/Lungs Clear    Heart/Vascular:  Heart Findings: Normal       Mental Status:  Mental Status Findings:  Cooperative, Alert and Oriented         Anesthesia Plan  Type of Anesthesia, risks & benefits discussed:  Anesthesia Type:  general, regional  Patient's Preference:   Intra-op Monitoring Plan: standard ASA monitors  Intra-op Monitoring Plan Comments:   Post Op Pain Control Plan: per primary service following discharge from PACU, IV/PO Opioids PRN and multimodal analgesia  Post Op Pain Control Plan Comments:   Induction:   IV  Beta Blocker:  Patient is not currently on a Beta-Blocker (No further documentation required).       Informed Consent: Patient understands risks and agrees with Anesthesia plan.  Questions answered. Anesthesia consent signed with patient.  ASA Score: 3     Day of Surgery Review of History & Physical: I have interviewed and examined the patient. I have reviewed the patient's H&P dated:            Ready For Surgery From Anesthesia Perspective.

## 2020-10-28 NOTE — TELEPHONE ENCOUNTER
Call made to patient to confirm her 1000 arrival time for her 1200 surgery tomorrow with Dr. Kevin at the [facility]. Patient verbalized understanding of arrival time and location of her surgery.

## 2020-10-29 ENCOUNTER — HOSPITAL ENCOUNTER (OUTPATIENT)
Dept: RADIOLOGY | Facility: HOSPITAL | Age: 60
Discharge: HOME | End: 2020-10-29
Attending: SURGERY | Admitting: SURGERY
Payer: COMMERCIAL

## 2020-10-29 ENCOUNTER — HOSPITAL ENCOUNTER (OUTPATIENT)
Facility: HOSPITAL | Age: 60
Discharge: HOME | End: 2020-10-30
Attending: SURGERY | Admitting: SURGERY
Payer: COMMERCIAL

## 2020-10-29 ENCOUNTER — ANESTHESIA (OUTPATIENT)
Dept: SURGERY | Facility: HOSPITAL | Age: 60
End: 2020-10-29
Payer: COMMERCIAL

## 2020-10-29 DIAGNOSIS — C50.811 MALIGNANT NEOPLASM OF OVERLAPPING SITES OF RIGHT BREAST IN FEMALE, ESTROGEN RECEPTOR POSITIVE: ICD-10-CM

## 2020-10-29 DIAGNOSIS — Z17.0 MALIGNANT NEOPLASM OF OVERLAPPING SITES OF RIGHT BREAST IN FEMALE, ESTROGEN RECEPTOR POSITIVE: ICD-10-CM

## 2020-10-29 DIAGNOSIS — Z17.0 MALIGNANT NEOPLASM OF OVERLAPPING SITES OF RIGHT BREAST IN FEMALE, ESTROGEN RECEPTOR POSITIVE: Primary | ICD-10-CM

## 2020-10-29 DIAGNOSIS — C50.811 MALIGNANT NEOPLASM OF OVERLAPPING SITES OF RIGHT BREAST IN FEMALE, ESTROGEN RECEPTOR POSITIVE: Primary | ICD-10-CM

## 2020-10-29 PROCEDURE — 25000003 PHARM REV CODE 250: Performed by: STUDENT IN AN ORGANIZED HEALTH CARE EDUCATION/TRAINING PROGRAM

## 2020-10-29 PROCEDURE — 88341 IMHCHEM/IMCYTCHM EA ADD ANTB: CPT | Performed by: PATHOLOGY

## 2020-10-29 PROCEDURE — 36000706: Performed by: SURGERY

## 2020-10-29 PROCEDURE — 63600175 PHARM REV CODE 636 W HCPCS: Performed by: SURGERY

## 2020-10-29 PROCEDURE — 19303 PR MASTECTOMY, SIMPLE, COMPLETE: ICD-10-PCS | Mod: 50,,, | Performed by: SURGERY

## 2020-10-29 PROCEDURE — 88305 TISSUE EXAM BY PATHOLOGIST: ICD-10-PCS | Mod: 26,,, | Performed by: PATHOLOGY

## 2020-10-29 PROCEDURE — 88307 TISSUE EXAM BY PATHOLOGIST: CPT | Mod: 26,,, | Performed by: PATHOLOGY

## 2020-10-29 PROCEDURE — 63600175 PHARM REV CODE 636 W HCPCS: Performed by: STUDENT IN AN ORGANIZED HEALTH CARE EDUCATION/TRAINING PROGRAM

## 2020-10-29 PROCEDURE — 37000008 HC ANESTHESIA 1ST 15 MINUTES: Performed by: SURGERY

## 2020-10-29 PROCEDURE — 88305 TISSUE EXAM BY PATHOLOGIST: CPT | Performed by: PATHOLOGY

## 2020-10-29 PROCEDURE — 38792 PR IDENTIFY SENTINEL 2DE: ICD-10-PCS | Mod: RT,,, | Performed by: SURGERY

## 2020-10-29 PROCEDURE — 88341 IMHCHEM/IMCYTCHM EA ADD ANTB: CPT | Mod: 26,,, | Performed by: PATHOLOGY

## 2020-10-29 PROCEDURE — 25000003 PHARM REV CODE 250: Performed by: SURGERY

## 2020-10-29 PROCEDURE — D9220A PRA ANESTHESIA: Mod: ANES,,, | Performed by: ANESTHESIOLOGY

## 2020-10-29 PROCEDURE — 38525 BIOPSY/REMOVAL LYMPH NODES: CPT | Mod: 51,RT,, | Performed by: SURGERY

## 2020-10-29 PROCEDURE — 37000009 HC ANESTHESIA EA ADD 15 MINS: Performed by: SURGERY

## 2020-10-29 PROCEDURE — 19357 TISS XPNDR PLMT BRST RCNSTJ: CPT | Mod: 50,,, | Performed by: SURGERY

## 2020-10-29 PROCEDURE — 71000016 HC POSTOP RECOV ADDL HR: Performed by: SURGERY

## 2020-10-29 PROCEDURE — 88341 PR IHC OR ICC EACH ADD'L SINGLE ANTIBODY  STAINPR: ICD-10-PCS | Mod: 26,,, | Performed by: PATHOLOGY

## 2020-10-29 PROCEDURE — 36000707: Performed by: SURGERY

## 2020-10-29 PROCEDURE — 88331 PR  PATH CONSULT IN SURG,W FRZ SEC: ICD-10-PCS | Mod: 26,,, | Performed by: PATHOLOGY

## 2020-10-29 PROCEDURE — 88307 PR  SURG PATH,LEVEL V: ICD-10-PCS | Mod: 26,,, | Performed by: PATHOLOGY

## 2020-10-29 PROCEDURE — 88342 IMHCHEM/IMCYTCHM 1ST ANTB: CPT | Performed by: PATHOLOGY

## 2020-10-29 PROCEDURE — 88342 IMHCHEM/IMCYTCHM 1ST ANTB: CPT | Mod: 26,,, | Performed by: PATHOLOGY

## 2020-10-29 PROCEDURE — 64461 PARAVERTEBRAL SINGLE INJECTION BLOCK(S): ICD-10-PCS | Mod: 59,50,, | Performed by: ANESTHESIOLOGY

## 2020-10-29 PROCEDURE — 64461 PVB THORACIC SINGLE INJ SITE: CPT | Mod: 59,50,, | Performed by: ANESTHESIOLOGY

## 2020-10-29 PROCEDURE — 71000039 HC RECOVERY, EACH ADD'L HOUR: Performed by: SURGERY

## 2020-10-29 PROCEDURE — 88307 TISSUE EXAM BY PATHOLOGIST: CPT | Mod: 59 | Performed by: PATHOLOGY

## 2020-10-29 PROCEDURE — C1789 PROSTHESIS, BREAST, IMP: HCPCS | Performed by: SURGERY

## 2020-10-29 PROCEDURE — D9220A PRA ANESTHESIA: Mod: CRNA,,, | Performed by: NURSE ANESTHETIST, CERTIFIED REGISTERED

## 2020-10-29 PROCEDURE — 19303 MAST SIMPLE COMPLETE: CPT | Mod: 50,,, | Performed by: SURGERY

## 2020-10-29 PROCEDURE — 88305 TISSUE EXAM BY PATHOLOGIST: CPT | Mod: 26,,, | Performed by: PATHOLOGY

## 2020-10-29 PROCEDURE — 88331 PATH CONSLTJ SURG 1 BLK 1SPC: CPT | Performed by: PATHOLOGY

## 2020-10-29 PROCEDURE — 38525 PR BIOPSY/REM LYMPH NODES, AXILLARY: ICD-10-PCS | Mod: 51,RT,, | Performed by: SURGERY

## 2020-10-29 PROCEDURE — 19357 PR BREAST RECONSTRUC W TISS EXPANDR: ICD-10-PCS | Mod: 50,,, | Performed by: SURGERY

## 2020-10-29 PROCEDURE — 64461 PVB THORACIC SINGLE INJ SITE: CPT | Mod: 50,59 | Performed by: STUDENT IN AN ORGANIZED HEALTH CARE EDUCATION/TRAINING PROGRAM

## 2020-10-29 PROCEDURE — C1729 CATH, DRAINAGE: HCPCS | Performed by: SURGERY

## 2020-10-29 PROCEDURE — 88342 CHG IMMUNOCYTOCHEMISTRY: ICD-10-PCS | Mod: 26,,, | Performed by: PATHOLOGY

## 2020-10-29 PROCEDURE — 71000015 HC POSTOP RECOV 1ST HR: Performed by: SURGERY

## 2020-10-29 PROCEDURE — 88331 PATH CONSLTJ SURG 1 BLK 1SPC: CPT | Mod: 26,,, | Performed by: PATHOLOGY

## 2020-10-29 PROCEDURE — 63600175 PHARM REV CODE 636 W HCPCS: Performed by: NURSE ANESTHETIST, CERTIFIED REGISTERED

## 2020-10-29 PROCEDURE — 38792 RA TRACER ID OF SENTINL NODE: CPT | Mod: RT,,, | Performed by: SURGERY

## 2020-10-29 PROCEDURE — A4216 STERILE WATER/SALINE, 10 ML: HCPCS | Performed by: SURGERY

## 2020-10-29 PROCEDURE — 71000033 HC RECOVERY, INTIAL HOUR: Performed by: SURGERY

## 2020-10-29 PROCEDURE — D9220A PRA ANESTHESIA: ICD-10-PCS | Mod: CRNA,,, | Performed by: NURSE ANESTHETIST, CERTIFIED REGISTERED

## 2020-10-29 PROCEDURE — 27201423 OPTIME MED/SURG SUP & DEVICES STERILE SUPPLY: Performed by: SURGERY

## 2020-10-29 PROCEDURE — D9220A PRA ANESTHESIA: ICD-10-PCS | Mod: ANES,,, | Performed by: ANESTHESIOLOGY

## 2020-10-29 DEVICE — EXPANDER NATRELLE FH EP 550CC: Type: IMPLANTABLE DEVICE | Site: BREAST | Status: FUNCTIONAL

## 2020-10-29 RX ORDER — ONDANSETRON 2 MG/ML
4 INJECTION INTRAMUSCULAR; INTRAVENOUS EVERY 12 HOURS PRN
Status: DISCONTINUED | OUTPATIENT
Start: 2020-10-29 | End: 2020-10-29

## 2020-10-29 RX ORDER — MIDAZOLAM HYDROCHLORIDE 1 MG/ML
0.5 INJECTION INTRAMUSCULAR; INTRAVENOUS
Status: DISCONTINUED | OUTPATIENT
Start: 2020-10-29 | End: 2020-10-29

## 2020-10-29 RX ORDER — SODIUM CHLORIDE 9 MG/ML
INJECTION, SOLUTION INTRAVENOUS CONTINUOUS PRN
Status: DISCONTINUED | OUTPATIENT
Start: 2020-10-29 | End: 2020-10-29

## 2020-10-29 RX ORDER — MUPIROCIN 20 MG/G
1 OINTMENT TOPICAL 2 TIMES DAILY
Status: DISCONTINUED | OUTPATIENT
Start: 2020-10-29 | End: 2020-10-30 | Stop reason: HOSPADM

## 2020-10-29 RX ORDER — KETAMINE HCL IN 0.9 % NACL 50 MG/5 ML
SYRINGE (ML) INTRAVENOUS
Status: DISCONTINUED | OUTPATIENT
Start: 2020-10-29 | End: 2020-10-29

## 2020-10-29 RX ORDER — HYDROCODONE BITARTRATE AND ACETAMINOPHEN 10; 325 MG/1; MG/1
1 TABLET ORAL EVERY 4 HOURS PRN
Status: DISCONTINUED | OUTPATIENT
Start: 2020-10-29 | End: 2020-10-30

## 2020-10-29 RX ORDER — FENTANYL CITRATE 50 UG/ML
INJECTION, SOLUTION INTRAMUSCULAR; INTRAVENOUS
Status: DISCONTINUED | OUTPATIENT
Start: 2020-10-29 | End: 2020-10-29

## 2020-10-29 RX ORDER — GABAPENTIN 300 MG/1
600 CAPSULE ORAL 3 TIMES DAILY
Status: DISCONTINUED | OUTPATIENT
Start: 2020-10-29 | End: 2020-10-30 | Stop reason: HOSPADM

## 2020-10-29 RX ORDER — SODIUM CHLORIDE 0.9 % (FLUSH) 0.9 %
10 SYRINGE (ML) INJECTION
Status: DISCONTINUED | OUTPATIENT
Start: 2020-10-29 | End: 2020-10-29 | Stop reason: HOSPADM

## 2020-10-29 RX ORDER — LIDOCAINE HYDROCHLORIDE 20 MG/ML
INJECTION INTRAVENOUS
Status: DISCONTINUED | OUTPATIENT
Start: 2020-10-29 | End: 2020-10-29

## 2020-10-29 RX ORDER — CLINDAMYCIN HYDROCHLORIDE 150 MG/1
300 CAPSULE ORAL EVERY 8 HOURS
Status: DISCONTINUED | OUTPATIENT
Start: 2020-10-29 | End: 2020-10-30 | Stop reason: HOSPADM

## 2020-10-29 RX ORDER — ROCURONIUM BROMIDE 10 MG/ML
INJECTION, SOLUTION INTRAVENOUS
Status: DISCONTINUED | OUTPATIENT
Start: 2020-10-29 | End: 2020-10-29

## 2020-10-29 RX ORDER — PROPOFOL 10 MG/ML
VIAL (ML) INTRAVENOUS
Status: DISCONTINUED | OUTPATIENT
Start: 2020-10-29 | End: 2020-10-29

## 2020-10-29 RX ORDER — HYDROCODONE BITARTRATE AND ACETAMINOPHEN 5; 325 MG/1; MG/1
1 TABLET ORAL EVERY 4 HOURS PRN
Status: DISCONTINUED | OUTPATIENT
Start: 2020-10-29 | End: 2020-10-30

## 2020-10-29 RX ORDER — DEXAMETHASONE SODIUM PHOSPHATE 4 MG/ML
INJECTION, SOLUTION INTRA-ARTICULAR; INTRALESIONAL; INTRAMUSCULAR; INTRAVENOUS; SOFT TISSUE
Status: DISCONTINUED | OUTPATIENT
Start: 2020-10-29 | End: 2020-10-29

## 2020-10-29 RX ORDER — ACETAMINOPHEN 10 MG/ML
INJECTION, SOLUTION INTRAVENOUS
Status: DISCONTINUED | OUTPATIENT
Start: 2020-10-29 | End: 2020-10-29

## 2020-10-29 RX ORDER — SODIUM CHLORIDE 9 MG/ML
INJECTION, SOLUTION INTRAMUSCULAR; INTRAVENOUS; SUBCUTANEOUS
Status: DISCONTINUED | OUTPATIENT
Start: 2020-10-29 | End: 2020-10-29 | Stop reason: HOSPADM

## 2020-10-29 RX ORDER — ONDANSETRON 2 MG/ML
INJECTION INTRAMUSCULAR; INTRAVENOUS
Status: DISCONTINUED | OUTPATIENT
Start: 2020-10-29 | End: 2020-10-29

## 2020-10-29 RX ORDER — SODIUM CHLORIDE 9 MG/ML
INJECTION, SOLUTION INTRAVENOUS CONTINUOUS
Status: DISCONTINUED | OUTPATIENT
Start: 2020-10-29 | End: 2020-10-30

## 2020-10-29 RX ORDER — FENTANYL CITRATE 50 UG/ML
25 INJECTION, SOLUTION INTRAMUSCULAR; INTRAVENOUS EVERY 5 MIN PRN
Status: DISCONTINUED | OUTPATIENT
Start: 2020-10-29 | End: 2020-10-29 | Stop reason: HOSPADM

## 2020-10-29 RX ORDER — ENOXAPARIN SODIUM 100 MG/ML
40 INJECTION SUBCUTANEOUS EVERY 24 HOURS
Status: DISCONTINUED | OUTPATIENT
Start: 2020-10-29 | End: 2020-10-30 | Stop reason: HOSPADM

## 2020-10-29 RX ORDER — PANTOPRAZOLE SODIUM 40 MG/1
40 TABLET, DELAYED RELEASE ORAL DAILY
Status: DISCONTINUED | OUTPATIENT
Start: 2020-10-30 | End: 2020-10-30 | Stop reason: HOSPADM

## 2020-10-29 RX ORDER — NEOSTIGMINE METHYLSULFATE 0.5 MG/ML
INJECTION, SOLUTION INTRAVENOUS
Status: DISCONTINUED | OUTPATIENT
Start: 2020-10-29 | End: 2020-10-29

## 2020-10-29 RX ORDER — FENTANYL CITRATE 50 UG/ML
25 INJECTION, SOLUTION INTRAMUSCULAR; INTRAVENOUS EVERY 5 MIN PRN
Status: DISCONTINUED | OUTPATIENT
Start: 2020-10-29 | End: 2020-10-29

## 2020-10-29 RX ORDER — CIPROFLOXACIN 2 MG/ML
INJECTION, SOLUTION INTRAVENOUS
Status: COMPLETED | OUTPATIENT
Start: 2020-10-29 | End: 2020-10-29

## 2020-10-29 RX ORDER — CEFAZOLIN SODIUM 1 G/3ML
2 INJECTION, POWDER, FOR SOLUTION INTRAMUSCULAR; INTRAVENOUS
Status: DISCONTINUED | OUTPATIENT
Start: 2020-10-29 | End: 2020-10-29

## 2020-10-29 RX ORDER — ONDANSETRON 2 MG/ML
4 INJECTION INTRAMUSCULAR; INTRAVENOUS EVERY 12 HOURS PRN
Status: DISCONTINUED | OUTPATIENT
Start: 2020-10-29 | End: 2020-10-30 | Stop reason: HOSPADM

## 2020-10-29 RX ORDER — CEFAZOLIN SODIUM 500 MG/2.2ML
INJECTION, POWDER, FOR SOLUTION INTRAMUSCULAR; INTRAVENOUS
Status: DISCONTINUED | OUTPATIENT
Start: 2020-10-29 | End: 2020-10-29 | Stop reason: HOSPADM

## 2020-10-29 RX ORDER — SODIUM CHLORIDE 9 MG/ML
INJECTION, SOLUTION INTRAVENOUS CONTINUOUS
Status: DISCONTINUED | OUTPATIENT
Start: 2020-10-29 | End: 2020-10-29

## 2020-10-29 RX ORDER — TALC
6 POWDER (GRAM) TOPICAL NIGHTLY PRN
Status: DISCONTINUED | OUTPATIENT
Start: 2020-10-29 | End: 2020-10-30 | Stop reason: HOSPADM

## 2020-10-29 RX ORDER — ONDANSETRON 2 MG/ML
4 INJECTION INTRAMUSCULAR; INTRAVENOUS ONCE AS NEEDED
Status: DISCONTINUED | OUTPATIENT
Start: 2020-10-29 | End: 2020-10-29 | Stop reason: HOSPADM

## 2020-10-29 RX ADMIN — DEXTROSE 2 G: 50 INJECTION, SOLUTION INTRAVENOUS at 12:10

## 2020-10-29 RX ADMIN — ROCURONIUM BROMIDE 20 MG: 10 INJECTION, SOLUTION INTRAVENOUS at 01:10

## 2020-10-29 RX ADMIN — ENOXAPARIN SODIUM 40 MG: 40 INJECTION SUBCUTANEOUS at 06:10

## 2020-10-29 RX ADMIN — ONDANSETRON 4 MG: 2 INJECTION, SOLUTION INTRAMUSCULAR; INTRAVENOUS at 04:10

## 2020-10-29 RX ADMIN — PROPOFOL 150 MG: 10 INJECTION, EMULSION INTRAVENOUS at 12:10

## 2020-10-29 RX ADMIN — HYDROCODONE BITARTRATE AND ACETAMINOPHEN 1 TABLET: 10; 325 TABLET ORAL at 06:10

## 2020-10-29 RX ADMIN — ONDANSETRON 4 MG: 2 INJECTION INTRAMUSCULAR; INTRAVENOUS at 09:10

## 2020-10-29 RX ADMIN — Medication 20 MG: at 02:10

## 2020-10-29 RX ADMIN — GABAPENTIN 600 MG: 300 CAPSULE ORAL at 09:10

## 2020-10-29 RX ADMIN — ROCURONIUM BROMIDE 20 MG: 10 INJECTION, SOLUTION INTRAVENOUS at 03:10

## 2020-10-29 RX ADMIN — MIDAZOLAM 4 MG: 1 INJECTION INTRAMUSCULAR; INTRAVENOUS at 11:10

## 2020-10-29 RX ADMIN — LIDOCAINE HYDROCHLORIDE 80 MG: 20 INJECTION, SOLUTION INTRAVENOUS at 12:10

## 2020-10-29 RX ADMIN — FENTANYL CITRATE 25 MCG: 50 INJECTION, SOLUTION INTRAMUSCULAR; INTRAVENOUS at 01:10

## 2020-10-29 RX ADMIN — FENTANYL CITRATE 100 MCG: 50 INJECTION, SOLUTION INTRAMUSCULAR; INTRAVENOUS at 12:10

## 2020-10-29 RX ADMIN — ROCURONIUM BROMIDE 40 MG: 10 INJECTION, SOLUTION INTRAVENOUS at 12:10

## 2020-10-29 RX ADMIN — SODIUM CHLORIDE, SODIUM GLUCONATE, SODIUM ACETATE, POTASSIUM CHLORIDE, MAGNESIUM CHLORIDE, SODIUM PHOSPHATE, DIBASIC, AND POTASSIUM PHOSPHATE: .53; .5; .37; .037; .03; .012; .00082 INJECTION, SOLUTION INTRAVENOUS at 03:10

## 2020-10-29 RX ADMIN — Medication 10 MG: at 05:10

## 2020-10-29 RX ADMIN — HYDROCODONE BITARTRATE AND ACETAMINOPHEN 1 TABLET: 10; 325 TABLET ORAL at 10:10

## 2020-10-29 RX ADMIN — FENTANYL CITRATE 50 MCG: 50 INJECTION, SOLUTION INTRAMUSCULAR; INTRAVENOUS at 06:10

## 2020-10-29 RX ADMIN — Medication 10 MG: at 02:10

## 2020-10-29 RX ADMIN — CLINDAMYCIN HYDROCHLORIDE 300 MG: 150 CAPSULE ORAL at 09:10

## 2020-10-29 RX ADMIN — Medication 10 MG: at 03:10

## 2020-10-29 RX ADMIN — DEXAMETHASONE SODIUM PHOSPHATE 8 MG: 4 INJECTION, SOLUTION INTRAMUSCULAR; INTRAVENOUS at 12:10

## 2020-10-29 RX ADMIN — NEOSTIGMINE METHYLSULFATE 4 MG: 0.5 INJECTION INTRAVENOUS at 05:10

## 2020-10-29 RX ADMIN — SODIUM CHLORIDE, SODIUM GLUCONATE, SODIUM ACETATE, POTASSIUM CHLORIDE, MAGNESIUM CHLORIDE, SODIUM PHOSPHATE, DIBASIC, AND POTASSIUM PHOSPHATE: .53; .5; .37; .037; .03; .012; .00082 INJECTION, SOLUTION INTRAVENOUS at 12:10

## 2020-10-29 RX ADMIN — MUPIROCIN 1 G: 20 OINTMENT TOPICAL at 09:10

## 2020-10-29 RX ADMIN — ROCURONIUM BROMIDE 10 MG: 10 INJECTION, SOLUTION INTRAVENOUS at 02:10

## 2020-10-29 RX ADMIN — SODIUM CHLORIDE: 0.9 INJECTION, SOLUTION INTRAVENOUS at 06:10

## 2020-10-29 RX ADMIN — ACETAMINOPHEN 1000 MG: 10 INJECTION, SOLUTION INTRAVENOUS at 01:10

## 2020-10-29 RX ADMIN — SODIUM CHLORIDE: 0.9 INJECTION, SOLUTION INTRAVENOUS at 12:10

## 2020-10-29 RX ADMIN — FENTANYL CITRATE 50 MCG: 50 INJECTION, SOLUTION INTRAMUSCULAR; INTRAVENOUS at 01:10

## 2020-10-29 RX ADMIN — FENTANYL CITRATE 25 MCG: 50 INJECTION, SOLUTION INTRAMUSCULAR; INTRAVENOUS at 03:10

## 2020-10-29 RX ADMIN — ROCURONIUM BROMIDE 10 MG: 10 INJECTION, SOLUTION INTRAVENOUS at 12:10

## 2020-10-29 NOTE — OP NOTE
Operative Note     10/29/2020    PRE-OP DIAGNOSIS: Malignant neoplasm of upper outer quadrant of right breast in female, estrogen receptor positive     POST-OP DIAGNOSIS: Post-Op Diagnosis Codes:    same    PROCEDURES:    Procedure(s):  MASTECTOMY, BILATERAL TOTAL  BIOPSY, LYMPH NODE, SENTINEL RIGHT  INJECTION, FOR SENTINEL NODE IDENTIFICATION RIGHT    INSERTION, TISSUE EXPANDER, BREAST  - to be dictated separately by Dr. murphy    SURGEON: Surgeon(s) and Role:  Panel 1:     * Baylee Kevin MD - Primary     * Kirk Thorpe MD - Resident - Assisting  Panel 2:     * Scottie Murphy MD - Primary     * Adolfo Tracey MD - Resident - Assisting     RESIDENT:       ANESTHESIA: General     OPERATIVE FINDINGS: healthy appearing skin flaps at the completion of the mastectomies.  [place] node negative on frozen section.    INDICATION FOR PROCEDURE: This patient presents with a history of invasive carcinoma of the right breast    PROCEDURE IN DETAIL:  Earl Abdul is a 60 y.o. female brought to the operating room for definitive surgery of invasive carcinoma of the right breast.  The patient has elected to undergo bilateral simple mastectomy with sentinel lymph node biopsy for екатерина assessment. The patient was informed of the possible risks and complications of the procedure, including but not limited to anesthetic risks, bleeding, infection, and need for additional surgery.  The patient concurred with the proposed plan, and has given informed consent.  The site of surgery was properly noted/marked in the preoperative holding area.     The patient was then brought to the operating room and placed in the supine position with both upper extremities extended.  regional and general anesthesia was administered. Perioperative antibiotics were administered consisting of Ancef and a time out was performed confirming the patient, site, and procedure.  The patient's right breast was injected with  technetium to facilitate sentinel lymph node identification. The bilateral chest and axilla was then prepped and draped in the usual sterile fashion.    We first turned our attention to the left prophylactic breast where an elliptical incision was extended from the axillary incision to incorporate the nipple areolar complex.  The incision was made with a 10-blade and deepened through the subcutaneous tissues with Bovie electrocautery.  Skin flaps were raised to the clavicle superiorly, to the lateral border of the sternum medially, to the inframammary fold inferiorly, and to the anterior border of the latissimus dorsi muscle laterally. The breast tissue was sharply excised off the chest wall taking care to incorporate the pectoralis fascia while leaving the serratus fascia behind.  The resulting mastectomy specimen was marked using a short stitch superiorly and a long stitch laterally.  The breast was sent to pathology for permanent evaluation.   The operative field was irrigated with normal saline and all bleeding points were secured with Bovie electrocautery.        We then turned our attention to the right breast where an elliptical incision was fashioned to incorporate the nipple areolar complex.  The incision was made with a 10-blade and extended through the subcutaneous tissues with Bovie electrocautery.  Skin flaps were raised to the clavicle superiorly.  We then  turned our attention to the right axilla.   The gamma probe was used to identify an area of increased radioactivity within the lower axilla. The clavipectoral sheath was sharply incised to reveal the level I axillary lymph nodes. The probe was used to identify a single node with increased radioactivity.  This node was brought into the operative field and carefully dissected free of the surrounding lymphovascular structures.  The highest ex vivo count of the node was 209.  The node was then sent to pathology for frozen section evaluation, labeled as  sentinel node #1.  A total of 1 axillary sentinel nodes and 0 axillary non-sentinel nodes were identified, excised and submitted to pathology.  Bed counts were obtained to confirm that the 10% rule had not been violated.   The wound was irrigated with normal saline, and all bleeding points were secured with Bovie electrocautery.     We then proceeded to raise the remainder of the flaps to the lateral border of the sternum medially, to the inframammary fold inferiorly, and to the anterior border of the latissimus dorsi muscle laterally. The breast tissue was sharply excised off the chest wall taking care to incorporate the pectoralis fascia while leaving the serratus fascia behind.  The resulting mastectomy specimen was marked using a short stitch superiorly and long stitch laterally.  The breast was sent to pathology for permanent evaluation.      Frozen section екатерина evaluation revealed no evidence of metastatic disease.  Therefore, the operative field was irrigated with normal saline and all bleeding points were secured with Bovie electrocautery. The case was then turned over for reconstruction and closure by the plastic surgery service.      Dermabond was applied. A post surgical bra was placed on the patient. At the end of the operation, all sponge, instrument, and needle counts x 2 were correct.    ESTIMATED BLOOD LOSS: less than 50 mL    COMPLICATIONS: none    DISPOSITION: PACU - hemodynamically stable.    ATTESTATION:   I was present and scrubbed for the entire procedure.

## 2020-10-29 NOTE — BRIEF OP NOTE
[facility]  Brief Operative Note     SUMMARY     Surgery Date: 10/29/2020     Surgeon(s) and Role:  Panel 1:     * Baylee Kevin MD - Primary     * Kirk Thorpe MD - Resident - Assisting  Panel 2:     * Scottie Johnson MD - Primary     * Adolfo Tracey MD - Resident - Assisting    Assistant: Adolfo Tracey, medical student    Pre-op Diagnosis:  Malignant neoplasm of overlapping sites of right breast in female, estrogen receptor positive [C50.811, Z17.0]    Post-op Diagnosis:  Post-Op Diagnosis Codes:     * Malignant neoplasm of overlapping sites of right breast in female, estrogen receptor positive [C50.811, Z17.0]    Procedure(s) (LRB):  MASTECTOMY, BILATERAL (Bilateral)  BIOPSY, LYMPH NODE, SENTINEL (Right)  INSERTION, TISSUE EXPANDER, BREAST (Bilateral)  INJECTION, FOR SENTINEL NODE IDENTIFICATION (Right)    Anesthesia: General    Description of the findings of the procedure: see full op note    Findings/Key Components: See operative report    Estimated Blood Loss: * No values recorded between 10/29/2020  1:04 PM and 10/29/2020  5:52 PM *         Specimens:   Specimen (12h ago, onward)    None          Implants:   Implant Name Type Inv. Item Serial No.  Lot No. LRB No. Used Action   EXPANDER NATRELLE  EP 550CC - [id number]  EXPANDER NATRELLE  EP 550CC [id number] [employer]  Left 1 Implanted   EXPANDER [employer]  EP 550CC - [id number]  EXPANDER [employer]RELLE  EP 550CC [id number] [employer]  Right 1 Implanted       Complications: None    Disposition: admit

## 2020-10-29 NOTE — TRANSFER OF CARE
"Anesthesia Transfer of Care Note    Patient: Earl Abdul    Procedure(s) Performed: Procedure(s) (LRB):  MASTECTOMY, BILATERAL (Bilateral)  BIOPSY, LYMPH NODE, SENTINEL (Right)  INSERTION, TISSUE EXPANDER, BREAST (Bilateral)  INJECTION, FOR SENTINEL NODE IDENTIFICATION (Right)    Patient location: PACU    Anesthesia Type: general    Transport from OR: Transported from OR on 6-10 L/min O2 by face mask with adequate spontaneous ventilation    Post pain: adequate analgesia    Post assessment: no apparent anesthetic complications    Post vital signs: stable    Level of consciousness: awake, alert and oriented    Nausea/Vomiting: no nausea/vomiting    Complications: none    Transfer of care protocol was followed      Last vitals:   Visit Vitals  /65 (BP Location: Left arm, Patient Position: Lying)   Pulse 77   Temp 36.4 °C (97.5 °F) (Temporal)   Resp (!) 22   Ht 5' 6" (1.676 m)   Wt 81.6 kg (180 lb)   SpO2 96%   Breastfeeding No   BMI 29.05 kg/m²     "

## 2020-10-29 NOTE — BRIEF OP NOTE
[facility]  Brief Operative Note    Surgery Date: 10/29/2020     Surgeon(s) and Role:  Panel 1:     * Baylee Kevin MD - Primary     * Kirk Thorpe MD - Resident - Assisting  Panel 2:     * Scottie Johnson MD - Primary     * Adolfo Tracey MD - Resident - Assisting        Pre-op Diagnosis:  Malignant neoplasm of overlapping sites of right breast in female, estrogen receptor positive [C50.811, Z17.0]    Post-op Diagnosis:  Post-Op Diagnosis Codes:     * Malignant neoplasm of overlapping sites of right breast in female, estrogen receptor positive [C50.811, Z17.0]    Procedure(s) (LRB):  MASTECTOMY, BILATERAL (Bilateral)  BIOPSY, LYMPH NODE, SENTINEL (Right)  INJECTION, FOR SENTINEL NODE IDENTIFICATION (Right)    INSERTION, TISSUE EXPANDER, BREAST (Bilateral)    Anesthesia: General    Description of the findings of the procedure(s): Right breast tissue was excised using cautery. Right sentinel lymph node traced using radiolabeled injection and gamma probe. Node was excised with blunt and sharp dissection. Specimen sent to pathology. Attention was then turned to the left breast which was excised using cautery dissection. Both breast specimens were labeled and sent to pathology. Hemostasis was achieved within the dissection cavities and wounds were thoroughly irrigated. Plastic surgery team then began their portion of the surgery.    Estimated Blood Loss: 20cc         Specimens:   1. Right mastectomy  2. Right sentinel lymph node  3. Left mastectomy  Specimen (12h ago, onward)    None

## 2020-10-29 NOTE — ANESTHESIA PROCEDURE NOTES
Paravertebral Single Injection Block(s)    Patient location during procedure: pre-op   Block not for primary anesthetic.  Reason for block: at surgeon's request and post-op pain management   Post-op Pain Location: bilateral chest pain  Start time: 10/29/2020 11:50 AM  Timeout: 10/29/2020 11:50 AM   End time: 10/29/2020 12:00 PM    Staffing  Authorizing Provider: Shon Hughes MD  Performing Provider: Abel Sandoval MD    Staffing  Other anesthesia staff: Abel Johnson MD  Preanesthetic Checklist  Completed: patient identified, site marked, surgical consent, pre-op evaluation, timeout performed, IV checked, risks and benefits discussed and monitors and equipment checked  Peripheral Block  Patient position: sitting  Prep: ChloraPrep  Patient monitoring: heart rate, cardiac monitor, continuous pulse ox, continuous capnometry and frequent blood pressure checks  Block type: paravertebral - thoracic  Laterality: bilateral  Injection technique: single shot  Location: T3-4  Needle  Needle type: Tuohy   Needle gauge: 17 G  Needle length: 3.5 in  Needle localization: anatomical landmarks     Assessment  Injection assessment: negative aspiration and negative parasthesia  Paresthesia pain: none  Heart rate change: no  Slow fractionated injection: yes  Additional Notes  T4 os at 4 cm  VSS.  [facility] RN monitoring vitals throughout procedure.  Patient tolerated procedure well.    15 mL of 0.5% ropi w/ epi administered bilaterally.

## 2020-10-29 NOTE — ANESTHESIA PROCEDURE NOTES
Intubation  Performed by: Alan Clements MD  Authorized by: Rosalina Ham MD     Intubation:     Induction:  Intravenous    Intubated:  Postinduction    Mask Ventilation:  Easy with oral airway    Attempts:  1    Attempted By:  Resident anesthesiologist    Method of Intubation:  Direct    Blade:  [place] 3    Laryngeal View Grade: Grade I - full view of chords      Difficult Airway Encountered?: No      Complications:  None    Airway Device:  Oral endotracheal tube    Airway Device Size:  7.0    Style/Cuff Inflation:  Cuffed (inflated to minimal occlusive pressure)    Tube secured:  21    Secured at:  The lips    Placement Verified By:  Capnometry    Complicating Factors:  Small mouth and obesity    Findings Post-Intubation:  BS equal bilateral

## 2020-10-30 VITALS
RESPIRATION RATE: 20 BRPM | SYSTOLIC BLOOD PRESSURE: 119 MMHG | BODY MASS INDEX: 29.05 KG/M2 | OXYGEN SATURATION: 96 % | TEMPERATURE: 97 F | DIASTOLIC BLOOD PRESSURE: 60 MMHG | HEART RATE: 72 BPM | HEIGHT: 66 IN | WEIGHT: 180.75 LBS

## 2020-10-30 PROCEDURE — 94760 N-INVAS EAR/PLS OXIMETRY 1: CPT

## 2020-10-30 PROCEDURE — 25000242 PHARM REV CODE 250 ALT 637 W/ HCPCS: Performed by: STUDENT IN AN ORGANIZED HEALTH CARE EDUCATION/TRAINING PROGRAM

## 2020-10-30 PROCEDURE — 25000003 PHARM REV CODE 250: Performed by: STUDENT IN AN ORGANIZED HEALTH CARE EDUCATION/TRAINING PROGRAM

## 2020-10-30 PROCEDURE — 94761 N-INVAS EAR/PLS OXIMETRY MLT: CPT

## 2020-10-30 PROCEDURE — 94640 AIRWAY INHALATION TREATMENT: CPT

## 2020-10-30 RX ORDER — CLINDAMYCIN HYDROCHLORIDE 300 MG/1
300 CAPSULE ORAL EVERY 8 HOURS
Qty: 18 CAPSULE | Refills: 0 | Status: SHIPPED | OUTPATIENT
Start: 2020-10-30 | End: 2020-11-05

## 2020-10-30 RX ORDER — OXYCODONE HYDROCHLORIDE 5 MG/1
5 TABLET ORAL EVERY 4 HOURS PRN
Status: DISCONTINUED | OUTPATIENT
Start: 2020-10-30 | End: 2020-10-30

## 2020-10-30 RX ORDER — OXYCODONE HYDROCHLORIDE 10 MG/1
10 TABLET ORAL EVERY 4 HOURS PRN
Status: DISCONTINUED | OUTPATIENT
Start: 2020-10-30 | End: 2020-10-30 | Stop reason: HOSPADM

## 2020-10-30 RX ORDER — OXYCODONE HYDROCHLORIDE 10 MG/1
10 TABLET ORAL EVERY 6 HOURS PRN
Status: DISCONTINUED | OUTPATIENT
Start: 2020-10-30 | End: 2020-10-30

## 2020-10-30 RX ORDER — ACETAMINOPHEN 325 MG/1
650 TABLET ORAL EVERY 4 HOURS
Status: DISCONTINUED | OUTPATIENT
Start: 2020-10-30 | End: 2020-10-30 | Stop reason: HOSPADM

## 2020-10-30 RX ORDER — CYCLOBENZAPRINE HCL 5 MG
5 TABLET ORAL 3 TIMES DAILY PRN
Status: DISCONTINUED | OUTPATIENT
Start: 2020-10-30 | End: 2020-10-30 | Stop reason: HOSPADM

## 2020-10-30 RX ORDER — CYCLOBENZAPRINE HCL 5 MG
10 TABLET ORAL 3 TIMES DAILY PRN
Status: DISCONTINUED | OUTPATIENT
Start: 2020-10-30 | End: 2020-10-30

## 2020-10-30 RX ORDER — HYDROCODONE BITARTRATE AND ACETAMINOPHEN 5; 325 MG/1; MG/1
1 TABLET ORAL ONCE
Status: COMPLETED | OUTPATIENT
Start: 2020-10-30 | End: 2020-10-30

## 2020-10-30 RX ORDER — OXYCODONE HYDROCHLORIDE 5 MG/1
5 TABLET ORAL EVERY 4 HOURS PRN
Status: DISCONTINUED | OUTPATIENT
Start: 2020-10-30 | End: 2020-10-30 | Stop reason: HOSPADM

## 2020-10-30 RX ORDER — OXYCODONE HYDROCHLORIDE 5 MG/1
10 TABLET ORAL EVERY 6 HOURS PRN
Qty: 24 TABLET | Refills: 0 | Status: SHIPPED | OUTPATIENT
Start: 2020-10-30 | End: 2020-11-03 | Stop reason: SDUPTHER

## 2020-10-30 RX ORDER — HYDROCODONE BITARTRATE AND ACETAMINOPHEN 10; 325 MG/1; MG/1
1 TABLET ORAL EVERY 6 HOURS PRN
Qty: 21 TABLET | Refills: 0 | Status: SHIPPED | OUTPATIENT
Start: 2020-10-30 | End: 2020-10-30 | Stop reason: HOSPADM

## 2020-10-30 RX ADMIN — SODIUM CHLORIDE: 0.9 INJECTION, SOLUTION INTRAVENOUS at 03:10

## 2020-10-30 RX ADMIN — GABAPENTIN 600 MG: 300 CAPSULE ORAL at 08:10

## 2020-10-30 RX ADMIN — HYDROCODONE BITARTRATE AND ACETAMINOPHEN 1 TABLET: 10; 325 TABLET ORAL at 10:10

## 2020-10-30 RX ADMIN — CLINDAMYCIN HYDROCHLORIDE 300 MG: 150 CAPSULE ORAL at 03:10

## 2020-10-30 RX ADMIN — OXYCODONE HYDROCHLORIDE 10 MG: 10 TABLET ORAL at 03:10

## 2020-10-30 RX ADMIN — MUPIROCIN 1 G: 20 OINTMENT TOPICAL at 08:10

## 2020-10-30 RX ADMIN — HYDROCODONE BITARTRATE AND ACETAMINOPHEN 1 TABLET: 10; 325 TABLET ORAL at 02:10

## 2020-10-30 RX ADMIN — PANTOPRAZOLE SODIUM 40 MG: 40 TABLET, DELAYED RELEASE ORAL at 08:10

## 2020-10-30 RX ADMIN — CLINDAMYCIN HYDROCHLORIDE 300 MG: 150 CAPSULE ORAL at 05:10

## 2020-10-30 RX ADMIN — CYCLOBENZAPRINE HYDROCHLORIDE 5 MG: 5 TABLET, FILM COATED ORAL at 12:10

## 2020-10-30 RX ADMIN — HYDROCODONE BITARTRATE AND ACETAMINOPHEN 1 TABLET: 10; 325 TABLET ORAL at 05:10

## 2020-10-30 RX ADMIN — GABAPENTIN 600 MG: 300 CAPSULE ORAL at 03:10

## 2020-10-30 RX ADMIN — IPRATROPIUM BROMIDE 2 PUFF: 17 AEROSOL, METERED RESPIRATORY (INHALATION) at 07:10

## 2020-10-30 RX ADMIN — IPRATROPIUM BROMIDE 2 PUFF: 17 AEROSOL, METERED RESPIRATORY (INHALATION) at 12:10

## 2020-10-30 RX ADMIN — HYDROCODONE BITARTRATE AND ACETAMINOPHEN 1 TABLET: 5; 325 TABLET ORAL at 11:10

## 2020-10-30 RX ADMIN — ACETAMINOPHEN 650 MG: 325 TABLET ORAL at 03:10

## 2020-10-30 NOTE — ASSESSMENT & PLAN NOTE
61 yo F s/p bilateral mastectomies with tissue expander replacement 10/30/20.    Regular diet  Multimodal pain regimen  Nausea control prn  Restart home meds  Clinda per plastics  D/c mIVF  Ambulate TID  Record ROSA ELENA drain output every 4 hours  Prevena vacs to suction    Dispo: Likely discharge this AM

## 2020-10-30 NOTE — PLAN OF CARE
POC reviewed with pt, [id number]. No s/s of respiratory or cardiac distress. VS stable on RA. Adequate UOP; ambulating to bathroom w/ standby assist. NS infusing at 125 mL/hr. Tolerating regular diet well. Nausea managed w/ PRN ondansetron. Pain managed mildly with PRN.     2 ROSA ELENA drains to left chest  2 ROSA ELENA drains to right chest  Bilat wound vacs in place @ 125 mmHg.    Pt free from falls and injuries, bed in low position, side rails up x2, call light within reach.    Will continue to monitor.

## 2020-10-30 NOTE — SUBJECTIVE & OBJECTIVE
Interval History:     No major events overnight.  Pain in central chest, burning in nature  Ambulating  Tolerating liquids, hasn't tried much food  Urinating well without finley  JPs x 4 ss with 190 cc total overnight    Medications:  Continuous Infusions:   sodium chloride 0.9% 125 mL/hr at 10/30/20 0313     Scheduled Meds:   clindamycin  300 mg Oral Q8H    enoxaparin  40 mg Subcutaneous Q24H    gabapentin  600 mg Oral TID    ipratropium  2 puff Inhalation Q6H    mupirocin  1 g Nasal BID    pantoprazole  40 mg Oral Daily     PRN Meds:HYDROcodone-acetaminophen, HYDROcodone-acetaminophen, melatonin, ondansetron     Review of patient's allergies indicates:   Allergen Reactions    Fentanyl Other (See Comments)     Other reaction(s): Itching    Lortab  [hydrocodone-acetaminophen] Other (See Comments)    Phenothiazines      Objective:     Vital Signs (Most Recent):  Temp: 98.1 °F (36.7 °C) (10/30/20 0315)  Pulse: 63 (10/30/20 0315)  Resp: 16 (10/30/20 0506)  BP: 102/60 (10/30/20 0315)  SpO2: (!) 93 % (10/30/20 0315) Vital Signs (24h Range):  Temp:  [97.5 °F (36.4 °C)-98.1 °F (36.7 °C)] 98.1 °F (36.7 °C)  Pulse:  [60-87] 63  Resp:  [12-24] 16  SpO2:  [93 %-100 %] 93 %  BP: (102-176)/(55-79) 102/60     Weight: 81.6 kg (180 lb)  Body mass index is 29.05 kg/m².    Intake/Output - Last 3 Shifts       10/28 0700 - 10/29 0659 10/29 0700 - 10/30 0659    P.O.  480    I.V. (mL/kg)  2845.8 (34.9)    Total Intake(mL/kg)  3325.8 (40.8)    Urine (mL/kg/hr)  850    Drains  190    Total Output  1040    Net  +2285.8                Physical Exam  Vitals signs and nursing note reviewed.   Constitutional:       Appearance: Normal appearance. She is well-developed.   HENT:      Head: Normocephalic and atraumatic.   Cardiovascular:      Rate and Rhythm: Normal rate and regular rhythm.   Pulmonary:      Effort: Pulmonary effort is normal. No respiratory distress.   Skin:     General: Skin is warm and dry.      Comments: Bilateral  breast incisions covered with prevena, good seal on both incisions  ROSA ELENA drains x 4 with serosanguinous output  Chest tender to palpation bilaterally, no swelling   Neurological:      General: No focal deficit present.      Mental Status: She is alert and oriented to person, place, and time.   Psychiatric:         Behavior: Behavior normal.         Thought Content: Thought content normal.         Significant Labs:  None    Significant Diagnostics:  None

## 2020-10-30 NOTE — ASSESSMENT & PLAN NOTE
Earl Abdul is a 60 y.o. female s/p bilateral mastectomies and bilateral tissue implant placement. POD1 now and doing well.     VS:  Stable. Tolerating full diet  PT/OT:  Ambulating independently to commode   DVT PPx:  lovenox 40mg   Cultures:  None  Abx:  Clindamycin 300mg q8h for 7 days  Labs:  H&H stable  Drain:  4 ROSA ELENA drains in place. Continue drain care and q4h recordings. Prevena vacs to suction.   Boudreaux:  No Boudreaux.     Dispo: Primary team planning discharge this am. Please follow up in plastics clinic 11/4 8:45am. Continue drain care at home. Patient educated at bedside about drain care and regular logging of drain output. Continue clindamycin for 7 days post-op.

## 2020-10-30 NOTE — OP NOTE
Date of surgery 10/29/2020  Preoperative diagnosis breast cancer  Postoperative diagnosis the same  Procedure performed is  1.  Immediate bilateral breast reconstruction using tissue expanders  Surgeon Alex  Anesthesia general  Complications none  Drains x4  Blood loss minimal.     After completion of the bilateral mastectomy entered the room.  Both sides were checked.  Both flaps were noted to be thick and viable.  Hemostasis was achieved using clips as well as the Bovie.  The breast pockets were then irrigated with Betadine, this was followed by triple antibiotic solution.  Next, the chest wall was then re-prepped and redraped and closed were changed.  Two 550 cc smooth silicone tissue expanders were opened on the back table all air was removed.  There were soaked in triple antibiotic solution.  They were placed in the prepectoral position.  They were sutured using 2-0 Prolene sutures.  Two drains were placed.  The incision was closed using interrupted 3-0 Monocryl followed by running 4-0 Monocryl subcuticular suture.  An exact procedure was performed on the opposite side.  It should be noted that each tissue expander was filled to 400 cc of air.  There were no complications with this procedure.

## 2020-10-30 NOTE — ANESTHESIA POSTPROCEDURE EVALUATION
Anesthesia Post Evaluation    Patient: Earl Abdul    Procedure(s) Performed: Procedure(s) (LRB):  MASTECTOMY, BILATERAL (Bilateral)  BIOPSY, LYMPH NODE, SENTINEL (Right)  INSERTION, TISSUE EXPANDER, BREAST (Bilateral)  INJECTION, FOR SENTINEL NODE IDENTIFICATION (Right)    Final Anesthesia Type: general    Patient location during evaluation: floor  Patient participation: Yes- Able to Participate  Level of consciousness: awake and alert  Post-procedure vital signs: reviewed and stable  Pain management: adequate  Airway patency: patent    PONV status at discharge: No PONV  Anesthetic complications: no      Cardiovascular status: hemodynamically stable  Respiratory status: unassisted  Hydration status: euvolemic  Follow-up not needed.          Vitals Value Taken Time   /69 10/30/20 0825   Temp 37 °C (98.6 °F) 10/30/20 0825   Pulse 72 10/30/20 0825   Resp 16 10/30/20 0825   SpO2 95 % 10/30/20 0825         Event Time   Out of Recovery 19:00:00         Pain/Mary Score: Pain Rating Prior to Med Admin: 10 (10/30/2020  5:06 AM)  Pain Rating Post Med Admin: 0 (10/29/2020 12:05 PM)  Mary Score: 9 (10/29/2020  6:30 PM)

## 2020-10-30 NOTE — PROGRESS NOTES
[facility]  General Surgery  Progress Note    Subjective:     History of Present Illness:  No notes on file    Post-Op Info:  Procedure(s) (LRB):  MASTECTOMY, BILATERAL (Bilateral)  BIOPSY, LYMPH NODE, SENTINEL (Right)  INSERTION, TISSUE EXPANDER, BREAST (Bilateral)  INJECTION, FOR SENTINEL NODE IDENTIFICATION (Right)   1 Day Post-Op     Interval History:     No major events overnight.  Pain in central chest, burning in nature  Ambulating  Tolerating liquids, hasn't tried much food  Urinating well without finley  JPs x 4 ss with 190 cc total overnight    Medications:  Continuous Infusions:   sodium chloride 0.9% 125 mL/hr at 10/30/20 0313     Scheduled Meds:   clindamycin  300 mg Oral Q8H    enoxaparin  40 mg Subcutaneous Q24H    gabapentin  600 mg Oral TID    ipratropium  2 puff Inhalation Q6H    mupirocin  1 g Nasal BID    pantoprazole  40 mg Oral Daily     PRN Meds:HYDROcodone-acetaminophen, HYDROcodone-acetaminophen, melatonin, ondansetron     Review of patient's allergies indicates:   Allergen Reactions    Fentanyl Other (See Comments)     Other reaction(s): Itching    Lortab  [hydrocodone-acetaminophen] Other (See Comments)    Phenothiazines      Objective:     Vital Signs (Most Recent):  Temp: 98.1 °F (36.7 °C) (10/30/20 0315)  Pulse: 63 (10/30/20 0315)  Resp: 16 (10/30/20 0506)  BP: 102/60 (10/30/20 0315)  SpO2: (!) 93 % (10/30/20 0315) Vital Signs (24h Range):  Temp:  [97.5 °F (36.4 °C)-98.1 °F (36.7 °C)] 98.1 °F (36.7 °C)  Pulse:  [60-87] 63  Resp:  [12-24] 16  SpO2:  [93 %-100 %] 93 %  BP: (102-176)/(55-79) 102/60     Weight: 81.6 kg (180 lb)  Body mass index is 29.05 kg/m².    Intake/Output - Last 3 Shifts       10/28 0700 - 10/29 0659 10/29 0700 - 10/30 0659    P.O.  480    I.V. (mL/kg)  2845.8 (34.9)    Total Intake(mL/kg)  3325.8 (40.8)    Urine (mL/kg/hr)  850    Drains  190    Total Output  1040    Net  +2285.8                Physical Exam  Vitals signs and nursing note  reviewed.   Constitutional:       Appearance: Normal appearance. She is well-developed.   HENT:      Head: Normocephalic and atraumatic.   Cardiovascular:      Rate and Rhythm: Normal rate and regular rhythm.   Pulmonary:      Effort: Pulmonary effort is normal. No respiratory distress.   Skin:     General: Skin is warm and dry.      Comments: Bilateral breast incisions covered with prevena, good seal on both incisions  ROSA ELENA drains x 4 with serosanguinous output  Chest tender to palpation bilaterally, no swelling   Neurological:      General: No focal deficit present.      Mental Status: She is alert and oriented to person, place, and time.   Psychiatric:         Behavior: Behavior normal.         Thought Content: Thought content normal.         Significant Labs:  None    Significant Diagnostics:  None    Assessment/Plan:     * Malignant neoplasm of overlapping sites of right breast in female, estrogen receptor positive  59 yo F s/p bilateral mastectomies with tissue expander replacement 10/30/20.    Regular diet  Multimodal pain regimen  Nausea control prn  Restart home meds  Clinda per plastics  D/c mIVF  Ambulate TID  Record ROSA ELENA drain output every 4 hours  Prevena vacs to suction    Dispo: Likely discharge this AM          Kirk Lang MD  General Surgery  [facility]

## 2020-10-30 NOTE — DISCHARGE SUMMARY
[facility]  General Surgery  Discharge Summary      Patient Name: Earl Abdul  MRN: [id number]  Admission Date: 10/29/2020  Hospital Length of Stay: 0 days  Discharge Date and Time:  10/30/2020 1:57 PM  Attending Physician: Baylee Kevin MD   Discharging Provider: Kirk Thorpe MD  Primary Care Provider: Izzy Garcia MD     HPI: Earl Abdul is a 60 y.o. female who presents with Invasive Ductal carcinoma of the R breast. Follow-up mammogram (9/04/2020) showed there is a 9 mm x 5 mm x 8 mm irregularly shaped, hypoechoic mass with indistinct margins seen in the right breast at 12 o'clock, 1 cm from the nipple. The mass correlates with the prior mammogram finding. Lymph nodes in the right axilla are normal. A ultrasound guided biopsy was performed on 9/29/2020 with pathology revealing infiltrating ductal carcinoma of the breast. Patient decided to proceed with bilateral mastectomy with right sentinel lymph node biopsy / tissue expander reconstruction.    Procedure(s) (LRB):  MASTECTOMY, BILATERAL (Bilateral)  BIOPSY, LYMPH NODE, SENTINEL (Right)  INSERTION, TISSUE EXPANDER, BREAST (Bilateral)  INJECTION, FOR SENTINEL NODE IDENTIFICATION (Right)     Hospital Course: Patient arrived to [facility] on the morning of 10/29/20 for her surgery. The procedure went as planned and patient was transported to the [facility] floor for overnight monitoring and pain control. Patient's vital signs remained stable throughout her course, and her pain was controlled with an oral pain regimen. She regained urinary and bowel function on POD 0. She has tolerated regular diet without issue. Bulb drains were monitored and recorded every 4 hours with satisfactory output. Overnight her pain level increased and on POD 1 her medications were adjusted to a multimodal regimen. Since that time her pain level has improved, her vital signs have remained stable, and she expresses wishes to be discharged. She understands that  her pain cannot be fully eliminated, and states that she feels confident that she can return to her home without any issues. Patient was discharged on POD 1 at 14:00. Medications to be delivered to bedside.    Consults:     Significant Diagnostic Studies:   Specimen (12h ago, onward)    None          Pending Diagnostic Studies:     Procedure Component Value Units Date/Time    Specimen to Pathology, Surgery Breast [[id number]] Collected: 10/29/20 1708    Order Status: Sent Lab Status: In process Updated: 10/30/20 8495        Final Active Diagnoses:    Diagnosis Date Noted POA    PRINCIPAL PROBLEM:  Malignant neoplasm of overlapping sites of right breast in female, estrogen receptor positive [C50.811, Z17.0] 10/07/2020 Not Applicable      Problems Resolved During this Admission:      Discharged Condition: good    Disposition: Home or Self Care    Follow Up:    Patient Instructions:      Diet Adult Regular     Lifting restrictions   Order Comments: No lifting over 10 pounds for 6 weeks or unless cleared by Plastic surgery.     Notify your health care provider if you experience any of the following:  temperature >100.4     Notify your health care provider if you experience any of the following:  persistent nausea and vomiting or diarrhea     Notify your health care provider if you experience any of the following:  severe uncontrolled pain     Notify your health care provider if you experience any of the following:  redness, tenderness, or signs of infection (pain, swelling, redness, odor or green/yellow discharge around incision site)     Leave dressing on - Keep it clean, dry, and intact until clinic visit     Activity as tolerated     Medications:  Reconciled Home Medications:      Medication List      START taking these medications    clindamycin 300 MG capsule  Commonly known as: CLEOCIN  Take 1 capsule (300 mg total) by mouth every 8 (eight) hours. for 6 days     oxyCODONE 5 MG immediate release tablet  Commonly  known as: ROXICODONE  Take 2 tablets (10 mg total) by mouth every 6 (six) hours as needed for Pain (Do not drive after taking this medication.).        CONTINUE taking these medications    acetaminophen 325 MG tablet  Commonly known as: TYLENOL  Take 2 tablets (650 mg total) by mouth every 6 (six) hours as needed.     ARIPiprazole 2 MG Tab  Commonly known as: ABILIFY  2 mg every evening.     dronabinoL 2.5 MG capsule  Commonly known as: MARINOL  Take 1 capsule (2.5 mg total) by mouth 2 (two) times daily before meals.     gabapentin 600 MG tablet  Commonly known as: NEURONTIN  Take 600 mg by mouth 3 (three) times daily.     ipratropium 17 mcg/actuation inhaler  Commonly known as: ATROVENT HFA  Inhale 2 puffs into the lungs every 6 (six) hours. Rescue for 14 days     levothyroxine 25 MCG tablet  Commonly known as: SYNTHROID  Take 1 tablet (25 mcg total) by mouth before breakfast.     omeprazole 20 MG capsule  Commonly known as: PRILOSEC  Take 60 mg by mouth once daily.     ondansetron 8 MG Tbdl  Commonly known as: ZOFRAN ODT  Take 1 tablet (8 mg total) by mouth 3 (three) times daily as needed (for nausea and vomiting).     rOPINIRole 0.25 MG tablet  Commonly known as: REQUIP  every evening.     traZODone 100 MG tablet  Commonly known as: DESYREL  TK 3 TS PO HS     valsartan 20 MG tablet  Commonly known as: DIOVAN  Take 20 mg by mouth once daily.            Kirk Thorpe MD  General Surgery  [facility]

## 2020-10-30 NOTE — PROGRESS NOTES
[facility]  Plastic Surgery  Progress Note    Subjective:       Post-Op Info:  Procedure(s) (LRB):  MASTECTOMY, BILATERAL (Bilateral)  BIOPSY, LYMPH NODE, SENTINEL (Right)  INSERTION, TISSUE EXPANDER, BREAST (Bilateral)  INJECTION, FOR SENTINEL NODE IDENTIFICATION (Right)   1 Day Post-Op     Interval History: NAEO. Pain is poorly controlled per patient on current regimen. Pain is located along incisions, worse with movement. Does not radiate and is relieved with rest. Tolerating diet. Out of bed with assistance, ambulating independently. Drains in place. Wound vacs bilaterally in place.     Medications:  Continuous Infusions:  Scheduled Meds:   clindamycin  300 mg Oral Q8H    enoxaparin  40 mg Subcutaneous Q24H    gabapentin  600 mg Oral TID    ipratropium  2 puff Inhalation Q6H    mupirocin  1 g Nasal BID    pantoprazole  40 mg Oral Daily     PRN Meds:HYDROcodone-acetaminophen, HYDROcodone-acetaminophen, melatonin, ondansetron     Review of patient's allergies indicates:   Allergen Reactions    Fentanyl Other (See Comments)     Other reaction(s): Itching    Lortab  [hydrocodone-acetaminophen] Other (See Comments)    Phenothiazines      Objective:     Vital Signs (Most Recent):  Temp: 98.1 °F (36.7 °C) (10/30/20 0315)  Pulse: 63 (10/30/20 0315)  Resp: 16 (10/30/20 0506)  BP: 102/60 (10/30/20 0315)  SpO2: (!) 93 % (10/30/20 0315) Vital Signs (24h Range):  Temp:  [97.5 °F (36.4 °C)-98.1 °F (36.7 °C)] 98.1 °F (36.7 °C)  Pulse:  [60-87] 63  Resp:  [12-24] 16  SpO2:  [93 %-100 %] 93 %  BP: (102-176)/(55-79) 102/60     Weight: 81.6 kg (180 lb)  Body mass index is 29.05 kg/m².    Intake/Output - Last 3 Shifts       10/28 0700 - 10/29 0659 10/29 0700 - 10/30 0659 10/30 0700 - 10/31 0659    P.O.  480     I.V. (mL/kg)  2845.8 (34.9)     Total Intake(mL/kg)  3325.8 (40.8)     Urine (mL/kg/hr)  850     Drains  190     Total Output  1040     Net  +2285.8                  Physical  Exam  Constitutional:       Appearance: Normal appearance. She is not ill-appearing.   HENT:      Head: Normocephalic.      Nose: Nose normal.      Mouth/Throat:      Mouth: Mucous membranes are moist.      Pharynx: Oropharynx is clear.   Eyes:      Extraocular Movements: Extraocular movements intact.      Conjunctiva/sclera: Conjunctivae normal.      Pupils: Pupils are equal, round, and reactive to light.   Neck:      Musculoskeletal: No neck rigidity or muscular tenderness.   Cardiovascular:      Rate and Rhythm: Normal rate and regular rhythm.      Pulses: Normal pulses.      Heart sounds: Normal heart sounds.   Pulmonary:      Effort: No respiratory distress.      Breath sounds: Normal breath sounds.   Abdominal:      General: There is no distension.      Palpations: Abdomen is soft.   Musculoskeletal: Normal range of motion.         General: No tenderness.   Skin:     General: Skin is warm and dry.      Capillary Refill: Capillary refill takes less than 2 seconds.      Comments: Wound vac in place to bilateral chest incisions. Tender to palpation. No swelling noted. Wound vacs appropriately suctioning. 0cc output in canisters overnight. ROSA ELENA drains bilaterally - total output: L breast 40cc, L breast 40cc, R breast 70cc, R breast 40cc   Neurological:      General: No focal deficit present.      Mental Status: She is alert and oriented to person, place, and time.   Psychiatric:         Mood and Affect: Mood normal.         Thought Content: Thought content normal.         Significant Labs:  CBC:   Recent Labs   Lab 10/26/20  1228   WBC 4.86   RBC 4.19   HGB 10.6*   HCT 36.5*   *   MCV 87   MCH 25.3*   MCHC 29.0*     CMP:   Recent Labs   Lab 10/26/20  1228   GLU 86   CALCIUM 9.0   ALBUMIN 3.7   PROT 6.9      K 4.6   CO2 27      BUN 16   CREATININE 0.8   ALKPHOS 60   ALT 38   AST 49*   BILITOT 0.5       Significant Diagnostics:  I have reviewed all pertinent imaging results/findings within the past  24 hours.    Assessment/Plan:     * Malignant neoplasm of overlapping sites of right breast in female, estrogen receptor positive  Earl Abdul is a 60 y.o. female s/p bilateral mastectomies and bilateral tissue implant placement. POD1 now and doing well.     VS:  Stable. Tolerating full diet  PT/OT:  Ambulating independently to commode   DVT PPx:  lovenox 40mg   Cultures:  None  Abx:  Clindamycin 300mg q8h for 7 days  Labs:  H&H stable  Drain:  4 ROSA ELENA drains in place. Continue drain care and q4h recordings. Prevena vacs to suction.   Boudreaux:  No Boudreaux.     Dispo: Primary team planning discharge this am. Please follow up in plastics clinic 11/4 8:45am. Continue drain care at home. Patient educated at bedside about drain care and regular logging of drain output. Continue clindamycin for 7 days post-op.       Ajit Johnson MD  Plastic Surgery  [facility]

## 2020-10-30 NOTE — NURSING TRANSFER
Nursing Transfer Note      10/29/2020     Transfer To: 1048    Transfer via stretcher    Transfer with iv pump, wound vac, prevena vac x2, pt belongings    Transported by pct    Medicines sent: ivf    Chart send with patient: Yes    Notified: spouse    Patient reassessed at: 10/29/20 @ 2030

## 2020-10-30 NOTE — DISCHARGE INSTRUCTIONS
POSTOPERATIVE INSTRUCTIONS FOLLOWING   MASTECTOMY AND/OR AXILLARY LYMPH NODE DISSECTION    The following are post-operative instructions that will help you to recover from your surgery.  Please read over these instructions carefully and contact us if we can answer any of your questions or concerns.    Post-op care/Dressing/breast binder (surgi-bra)  A surgical bra may be placed around your chest after your surgery.  If you are given the bra, please wear it for the first 48 hours after surgery. After 48 hours you can remove your surgical bra and dressing to shower/cleanse the chest wall with antibacterial soap and warm water. Do not take a tub bath and do not soak the surgical site for at least 2 weeks.     The final pathology report will be available approximately 7-10 days after your surgery.  Our office will call you with your pathology report when it becomes available.    If blue dye was used to locate your sentinel lymph nodes, your urine and stool may be blue-green in color for 1 or 2 days.    Dr. Daniel patients: please wear the surgical bra as close to 24 hours a day as possible until your post-operative clinic appointment.  If the elastic around the bra irritates your skin, you may wear a soft t-shirt underneath the bra. You may shower AFTER the drains are removed.  Please sponge bathe until then. Please do not remove the white strips of tape (steri-strips) that cover your incision.  They will be removed at your clinic visit. You may go without wearing the bra long enough to bath, to launder and dry the bra. If you have fluffy filler placed inside the bra, the filler should be removed whenever the bra is taken off. Please reinsert the fluffy filler, or insert the new soft filler, under the bra when you put the bra back on.  If the bra is extremely uncomfortable, you may wear a supportive sports bra instead after 2 days.    Activity    You will be able to do much of your own personal care, such as bathing,  dressing, preparing simple meals, etc.   A short walk each day will help with your recovery   You may find that you need to take rest breaks between activities, but you should not need to stay in bed for prolonged periods of time during the day. A good rule during this time is to listen to your body, do what is comfortable, and stop and rest when your feel tired.  If it hurts, dont do it.   Return to taking your daily medications as prescribed   Please avoid activities that require moderate to heavy lifting (grocery shopping) or pushing/pulling (vacuuming) and repetitive motions (such as washing windows). Do not lift anything heavier than a gallon of milk.   Following a lymph node dissection, dont avoid using your arm, but dont exercise your arm until after your first post-operative visit.  At your first post-op visit, you will be given arm exercises to regain movement and flexibility.  You may be referred to physical therapy if needed.   You may restart driving when you are no longer on narcotics and you feel safe turning the wheel and stopping quickly.   You will need to be out of work approximately 2-6 weeks depending on your particular surgery and how well you are recovering.  We will evaluate how you are doing at the first post-op appointment.  This is a good time to ask when you may return to work and what activities you may do.    Medication for pain   You will be given a prescription for pain medication. You should not drive or operate machinery while taking these.  Please take prescription pain medication (narcotics) with food.  Narcotics can cause, or worsen, constipation.  You will need to increase your fluid intake, eat high fiber foods (such as fruits and bran) and make sure that you are up and walking. You may need to take an over the counter stool softener for constipation.   Short term use of an icepack may be helpful to decrease discomfort and swelling, particularly to the armpit after  lymph node surgery.   A small pillow positioned in the armpit may also decrease discomfort after lymph node surgery.   If you are given a prescription for antibiotics, take them as prescribed.    How to care for your Drain(s)  1. Wash hands--STRIP or milk the drainage tube as it comes out of your body toward the bulb.   a. Beginning where the drain comes out of your body, hold drainage tubing with one hand and with the other, stretch and release tubing an inch at time while moving downward with both hands toward the bulb.  b. Do this 2-3 times before emptying the bulb.  2. Remove the stopper from the bulbs port  (drainage port)  3. Pour the drainage in the measuring cup provided by the nurse  4. Flatten/squeeze the bulb to create a vacuum and replace the stopper before letting go of the bulb.  5. Record the date, time and amount of drainage in ccs (not ounces) each time bulb is emptied. If you have more than one drain, record each separately.  6. Discard the drainage into the toilet after measuring and then wash hands.  7. Empty bulbs 2-3 times/day or as needed if it fills up before 8 hours.  8. Remember to bring the output record with you to your doctors appointment.    Please report the following:   Temperature greater than 101 degrees   Discharge or bad odor from the wound   Excessive bleeding, such as saturated bloody dressing or extreme bruising   Redness at incision and/or drain sites   Swelling or buildup of fluid around incision   Persistent fevers, chills, nausea, vomiting, or diarrhea    Additional information  Your surgeon will see you approximately 2 weeks following your surgery.  If this follow-up appointment has not been made, please call the office.    If you have any questions or problems, please call my office or my nurse.    Dr. Donna Massey, CLAUDIA Del Toro, CLAUDIA Del Toro, CLAUDIA Israel,  RN  [phone]    Negrita Gil PA-C  [phone]  Deepthi Calderón RN  [phone]    After hours and on weekends, you may call the main [facility] line at [phone] and ask to have the general surgery resident paged or have me paged      Lymphedema Risk Reduction    Lymphedema is a swelling of a part of the body, caused by an insufficient lymphatic system and an accumulation of fluid in the bodys tissues.  Lymphedema may occur when normal drainage of fluid is disrupted, such as an infection, injury, cancer, scar tissue, or removal of lymph nodes.    If you had a full axillary lymph node dissection procedure, you may be at greater risk for lymphedema.     For those patients having a sentinel lymph node biopsy, these risks may be smaller and the recommendations are provided for your review and consideration.    The following list contains recommendations for reducing your risk of developing lymphedema.    I. Skin Care--avoid trauma/injury to reduce infection risk   Keep the hand and arm on the side of surgery clean and dry   Pay attention to nail care and do not cut cuticles   Avoid punctures, such as injections and blood draws from you on the side of your surgery   Wear gloves while doing activities that may cause skin injury (washing dishes, gardening, etc.)   If scratches or punctures occur, wash area with soap and water, and observe for signs of infections (redness, drainage, swelling)   If a rash, itching, redness, pain, increased skin temperatures, fever, or flu-like symptoms occur, contact your physician immediately for early treatment of a possible infection  II. Activity/Lifestyle   Gradually build up the duration and intensity of any activity or exercise   Take frequent rest periods during activity to allow for arm recovery   Monitor your arm and upper body during and after activity for any change in size, shape, tissue, texture, soreness,  heaviness, or firmness  III. Avoid constriction of your arm on the side of your surgery   Avoid having blood pressure taken on the arm on the side of your surgery   Wear loose fitting jewelry and clothing   Be careful not to rest a heavy purse, luggage, or grocery bags on that arm   When you return to wearing a bra, make sure that it is well fitted and not too tight

## 2020-10-30 NOTE — SUBJECTIVE & OBJECTIVE
Interval History: NAEO. Pain is poorly controlled per patient on current regimen. Pain is located along incisions, worse with movement. Does not radiate and is relieved with rest. Tolerating diet. Out of bed with assistance, ambulating independently. Drains in place. Wound vacs bilaterally in place.     Medications:  Continuous Infusions:  Scheduled Meds:   clindamycin  300 mg Oral Q8H    enoxaparin  40 mg Subcutaneous Q24H    gabapentin  600 mg Oral TID    ipratropium  2 puff Inhalation Q6H    mupirocin  1 g Nasal BID    pantoprazole  40 mg Oral Daily     PRN Meds:HYDROcodone-acetaminophen, HYDROcodone-acetaminophen, melatonin, ondansetron     Review of patient's allergies indicates:   Allergen Reactions    Fentanyl Other (See Comments)     Other reaction(s): Itching    Lortab  [hydrocodone-acetaminophen] Other (See Comments)    Phenothiazines      Objective:     Vital Signs (Most Recent):  Temp: 98.1 °F (36.7 °C) (10/30/20 0315)  Pulse: 63 (10/30/20 0315)  Resp: 16 (10/30/20 0506)  BP: 102/60 (10/30/20 0315)  SpO2: (!) 93 % (10/30/20 0315) Vital Signs (24h Range):  Temp:  [97.5 °F (36.4 °C)-98.1 °F (36.7 °C)] 98.1 °F (36.7 °C)  Pulse:  [60-87] 63  Resp:  [12-24] 16  SpO2:  [93 %-100 %] 93 %  BP: (102-176)/(55-79) 102/60     Weight: 81.6 kg (180 lb)  Body mass index is 29.05 kg/m².    Intake/Output - Last 3 Shifts       10/28 0700 - 10/29 0659 10/29 0700 - 10/30 0659 10/30 0700 - 10/31 0659    P.O.  480     I.V. (mL/kg)  2845.8 (34.9)     Total Intake(mL/kg)  3325.8 (40.8)     Urine (mL/kg/hr)  850     Drains  190     Total Output  1040     Net  +2285.8                  Physical Exam  Constitutional:       Appearance: Normal appearance. She is not ill-appearing.   HENT:      Head: Normocephalic.      Nose: Nose normal.      Mouth/Throat:      Mouth: Mucous membranes are moist.      Pharynx: Oropharynx is clear.   Eyes:      Extraocular Movements: Extraocular movements intact.      Conjunctiva/sclera:  Conjunctivae normal.      Pupils: Pupils are equal, round, and reactive to light.   Neck:      Musculoskeletal: No neck rigidity or muscular tenderness.   Cardiovascular:      Rate and Rhythm: Normal rate and regular rhythm.      Pulses: Normal pulses.      Heart sounds: Normal heart sounds.   Pulmonary:      Effort: No respiratory distress.      Breath sounds: Normal breath sounds.   Abdominal:      General: There is no distension.      Palpations: Abdomen is soft.   Musculoskeletal: Normal range of motion.         General: No tenderness.   Skin:     General: Skin is warm and dry.      Capillary Refill: Capillary refill takes less than 2 seconds.      Comments: Wound vac in place to bilateral chest incisions. Tender to palpation. No swelling noted. Wound vacs appropriately suctioning. 0cc output in canisters overnight. ROSA ELENA drains bilaterally - total output: L breast 40cc, L breast 40cc, R breast 70cc, R breast 40cc   Neurological:      General: No focal deficit present.      Mental Status: She is alert and oriented to person, place, and time.   Psychiatric:         Mood and Affect: Mood normal.         Thought Content: Thought content normal.         Significant Labs:  CBC:   Recent Labs   Lab 10/26/20  1228   WBC 4.86   RBC 4.19   HGB 10.6*   HCT 36.5*   *   MCV 87   MCH 25.3*   MCHC 29.0*     CMP:   Recent Labs   Lab 10/26/20  1228   GLU 86   CALCIUM 9.0   ALBUMIN 3.7   PROT 6.9      K 4.6   CO2 27      BUN 16   CREATININE 0.8   ALKPHOS 60   ALT 38   AST 49*   BILITOT 0.5       Significant Diagnostics:  I have reviewed all pertinent imaging results/findings within the past 24 hours.

## 2020-10-31 NOTE — NURSING
Pt due to be discharged post ambulating the floor, and pain regimen ordered, and tolerated. Meds delivered at bedside. Pt teaching complete. Pt connected to bilateral wound vacs. Pt wheeled from floor by transport.

## 2020-11-03 ENCOUNTER — OFFICE VISIT (OUTPATIENT)
Dept: PLASTIC SURGERY | Facility: CLINIC | Age: 60
End: 2020-11-03
Payer: COMMERCIAL

## 2020-11-03 ENCOUNTER — PATIENT MESSAGE (OUTPATIENT)
Dept: PLASTIC SURGERY | Facility: CLINIC | Age: 60
End: 2020-11-03

## 2020-11-03 VITALS
BODY MASS INDEX: 29.05 KG/M2 | WEIGHT: 180.75 LBS | HEART RATE: 67 BPM | SYSTOLIC BLOOD PRESSURE: 153 MMHG | HEIGHT: 66 IN | DIASTOLIC BLOOD PRESSURE: 71 MMHG

## 2020-11-03 DIAGNOSIS — Z09 SURGERY FOLLOW-UP EXAMINATION: Primary | ICD-10-CM

## 2020-11-03 PROCEDURE — 99024 PR POST-OP FOLLOW-UP VISIT: ICD-10-PCS | Mod: S$GLB,,, | Performed by: PHYSICIAN ASSISTANT

## 2020-11-03 PROCEDURE — 99024 POSTOP FOLLOW-UP VISIT: CPT | Mod: S$GLB,,, | Performed by: PHYSICIAN ASSISTANT

## 2020-11-03 PROCEDURE — 99999 PR PBB SHADOW E&M-EST. PATIENT-LVL III: ICD-10-PCS | Mod: PBBFAC,,, | Performed by: PHYSICIAN ASSISTANT

## 2020-11-03 PROCEDURE — 99999 PR PBB SHADOW E&M-EST. PATIENT-LVL III: CPT | Mod: PBBFAC,,, | Performed by: PHYSICIAN ASSISTANT

## 2020-11-03 RX ORDER — OXYCODONE HYDROCHLORIDE 5 MG/1
10 TABLET ORAL EVERY 12 HOURS PRN
Qty: 28 TABLET | Refills: 0 | Status: SHIPPED | OUTPATIENT
Start: 2020-11-03 | End: 2020-12-23

## 2020-11-03 NOTE — PROGRESS NOTES
Subjective:      Earl Abdul is a 60 y.o. year old female who presents to [facility] on 11/03/2020 for follow up visit status post B breast reconstruction with TE placement on 10/29/2020 following B mastectomy by Dr Kevin. She is here today with concern the L top breast drain is no longer draining. B Prevena vacs intact. Drains to bulb suction x 4. Denies fever, chills, nausea, vomiting, or other systemic signs of infection.    Vitals:    11/03/20 0946   BP: (!) 153/71   Pulse: 67        Review of patient's allergies indicates:   Allergen Reactions    Fentanyl Other (See Comments)     Other reaction(s): Itching    Lortab  [hydrocodone-acetaminophen] Other (See Comments)    Phenothiazines        Current Outpatient Medications on File Prior to Visit   Medication Sig Dispense Refill    acetaminophen (TYLENOL) 325 MG tablet Take 2 tablets (650 mg total) by mouth every 6 (six) hours as needed.  0    ARIPiprazole (ABILIFY) 2 MG Tab 2 mg every evening.       clindamycin (CLEOCIN) 300 MG capsule Take 1 capsule (300 mg total) by mouth every 8 (eight) hours. for 6 days 18 capsule 0    dronabinol (MARINOL) 2.5 MG capsule Take 1 capsule (2.5 mg total) by mouth 2 (two) times daily before meals. 20 capsule 0    gabapentin (NEURONTIN) 600 MG tablet Take 600 mg by mouth 3 (three) times daily.       ipratropium (ATROVENT HFA) 17 mcg/actuation inhaler Inhale 2 puffs into the lungs every 6 (six) hours. Rescue for 14 days 1 Inhaler 0    levothyroxine (SYNTHROID) 25 MCG tablet Take 1 tablet (25 mcg total) by mouth before breakfast. 30 tablet 0    omeprazole (PRILOSEC) 20 MG capsule Take 60 mg by mouth once daily.       ondansetron (ZOFRAN ODT) 8 MG TbDL Take 1 tablet (8 mg total) by mouth 3 (three) times daily as needed (for nausea and vomiting). 30 tablet 0    rOPINIRole (REQUIP) 0.25 MG tablet every evening.       traZODone (DESYREL) 100 MG tablet TK 3 TS PO HS  1    valsartan (DIOVAN) 20 MG  tablet Take 20 mg by mouth once daily.      [DISCONTINUED] oxyCODONE (ROXICODONE) 5 MG immediate release tablet Take 2 tablets (10 mg total) by mouth every 6 (six) hours as needed for Pain (Do not drive after taking this medication.). 24 tablet 0     No current facility-administered medications on file prior to visit.        Patient Active Problem List   Diagnosis    Depression    Alcohol dependence, continuous    Alcohol withdrawal    Essential hypertension    Sarcoidosis of lung    Fibromyalgia    Tobacco abuse    Alcoholic fatty liver    Cannabis abuse, in remission    Nonspecific abnormal results of liver function study    Sarcoidosis    History of Clostridium difficile colitis    Diarrhea    Dehydration    History of substance abuse    Elevated liver enzymes    Ascites    Cirrhosis    New onset seizure    Posterior reversible encephalopathy syndrome    Depression with anxiety    Erythema nodosum    History of sarcoidosis    Hyperlipidemia    Ramírez's syndrome    Degenerative joint disease (DJD) of lumbar spine    Decreased ROM of lumbar spine    Difficulty walking    Transaminitis    VINICIO (obstructive sleep apnea)    Malignant neoplasm of overlapping sites of right breast in female, estrogen receptor positive    At risk for lymphedema       Past Surgical History:   Procedure Laterality Date    APPENDECTOMY      BILATERAL MASTECTOMY Bilateral 10/29/2020    Procedure: MASTECTOMY, BILATERAL;  Surgeon: Baylee Kevin MD;  Location: [facility] OR [address] [place] [address];  Service: General;  Laterality: Bilateral;    COLONOSCOPY N/A 7/28/2017    Procedure: COLONOSCOPY;  Surgeon: Aaron Alvarado MD;  Location: [facility];  Service: Endoscopy;  Laterality: N/A;    ESOPHAGOGASTRODUODENOSCOPY  10/7/2016, 11/6/2014    2016 - gastritis, duodenitis, 2014 erosive gastritis    ESOPHAGOGASTRODUODENOSCOPY N/A 2/11/2020    Procedure: ESOPHAGOGASTRODUODENOSCOPY (EGD);  Surgeon: Fawn Garrido MD;  Location: [facility]  ENDO;  Service: Endoscopy;  Laterality: N/A;    FLEXIBLE SIGMOIDOSCOPY  11/06/2014    colitis    HYSTERECTOMY      INJECTION FOR SENTINEL NODE IDENTIFICATION Right 10/29/2020    Procedure: INJECTION, FOR SENTINEL NODE IDENTIFICATION;  Surgeon: Baylee Kevin MD;  Location: [address];  Service: General;  Laterality: Right;    INJECTION OF JOINT Right 10/10/2019    Procedure: Injection, Joint RIGHT ILIOPSOAS BURSA/TENDON INJECTION AND RIGHT GLUTEAL TENDON INJECTION WITH STEROID AND LIDOCAINE;  Surgeon: Guillaume Rico MD;  Location: [facility];  Service: Pain Management;  Laterality: Right;  NEEDS CONSENT    INSERTION OF BREAST TISSUE EXPANDER Bilateral 10/29/2020    Procedure: INSERTION, TISSUE EXPANDER, BREAST;  Surgeon: Scottie Johnson MD;  Location: [address];  Service: Plastics;  Laterality: Bilateral;  Right breast: 1082 g  Left breast: 1076 g    mediastenoscopy      SENTINEL LYMPH NODE BIOPSY Right 10/29/2020    Procedure: BIOPSY, LYMPH NODE, SENTINEL;  Surgeon: Baylee Kevin MD;  Location: [address];  Service: General;  Laterality: Right;    TONSILLECTOMY N/A 1970    TUBAL LIGATION         Social History     Socioeconomic History    Marital status:      Spouse name: Not on file    Number of children: Not on file    Years of education: Not on file    Highest education level: Not on file   Occupational History    Not on file   Social Needs    Financial resource strain: Not on file    Food insecurity     Worry: Not on file     Inability: Not on file    Transportation needs     Medical: Not on file     Non-medical: Not on file   Tobacco Use    Smoking status: Current Every Day Smoker     Packs/day: 0.50     Years: 30.00     Pack years: 15.00     Types: Cigarettes    Smokeless tobacco: Never Used   Substance and Sexual Activity    Alcohol use: Not Currently     Comment: daily     Drug use: No    Sexual activity: Yes     Birth control/protection: Surgical    Lifestyle    Physical activity     Days per week: Not on file     Minutes per session: Not on file    Stress: Not on file   Relationships    Social connections     Talks on phone: Not on file     Gets together: Not on file     Attends [employer] service: Not on file     Active member of club or organization: Not on file     Attends meetings of clubs or organizations: Not on file     Relationship status: Not on file   Other Topics Concern    Not on file   Social History Narrative    Not on file     Date of surgery 10/29/2020  Preoperative diagnosis breast cancer  Postoperative diagnosis the same  Procedure performed is  1.  Immediate bilateral breast reconstruction using tissue expanders  Surgeon Alex  Anesthesia general  Complications none  Drains x4  Blood loss minimal.      Review of Systems: negative    Objective:     Physical Exam:  Vitals:    11/03/20 0946   BP: (!) 153/71   Pulse: 67       WD WN NAD  VSS  Normal resp effort  R breast - (Prevena Vac removed) incision CDI, no erythema or drainage, mastectomy flaps viable, surgically absent nipple, drain to bulb suction x 2  L breast -  (Prevena Vac removed) incision CDI, no erythema or drainage, mastectomy flaps viable, surgically absent nipple, drain to bulb suction x 2        Assessment:       1. Surgery follow-up examination        Plan:   60 y.o. female status post B breast reconstruction with TE placement  - Patient seen and evaluated by myself and Dr. Scottie Johnson    - Patient reports top drain on L breast is painful and stopped draining yesterday. Likely secondary to drain placement near axillary crease - 60cc syringe used to attempt aspiration of drain, minimal serous drainage withdrawn, drain removed. She has excess soft tissue of bilateral lateral breast which is symmetric. No asymmetry of L and R lateral breast and no palpable fluid accumulation.   - Top drains removed from B breast.   - DermaBond applied to B breast incisions.  Tegaderm placed over B breast drains that remain   - Refill on Roxicodone 5mg - take 2 tabls PO q 12 hours PRN pain. Disp # 28 (7 day supply). She understand that this will be her last narcotic prescription  - Return to clinic in 1 week, appointment scheduled.       All questions were answered. The patient was advised to call the clinic with any questions or concerns prior to their next visit.           Angelina Gatica PA-C  Plastic and Reconstruction Surgery Department  [phone] office

## 2020-11-04 LAB
FINAL PATHOLOGIC DIAGNOSIS: NORMAL
FROZEN SECTION DIAGNOSIS: NORMAL
FROZEN SECTION FOOTNOTE: NORMAL
GROSS: NORMAL
Lab: NORMAL

## 2020-11-06 ENCOUNTER — TELEPHONE (OUTPATIENT)
Dept: OBSTETRICS AND GYNECOLOGY | Facility: CLINIC | Age: 60
End: 2020-11-06

## 2020-11-06 NOTE — TELEPHONE ENCOUNTER
RN Navigator phoned patient to follow-up on her status post surgery. Patient remarks she is feeling well and coping well emotionally thus far. Patient notes mild irritability at times. Survivorship discussed in length. Support and resources discussed with patient. Patient currently due for her annual well-woman exam. Patient confirmed an appt for 12/03/2020 at 1330 with Dr. Arnold for her annual exam within a survivorship setting. Support and encouragement provided, CLAUDIA Pires.

## 2020-11-10 NOTE — PROGRESS NOTES
[facility]       Post-Op        REFERRING PHYSICIAN:  Izzy Garcia MD    MEDICAL ONCOLOGIST:    Pending  RADIATION ONCOLOGIST:   None needed    DIAGNOSIS:    This is a 60 y.o. female with a stage pT1b N0 M0 grade 2 ER + MI + HER2 - Invasive Ductal Carcinoma of the right breast.    TREATMENT SUMMARY:  The patient is status post bilateral total mastectomy and right sentinel node biopsy on 10/29/2020.  Final pathology showed Invasive Ductal carcinoma of the Right breast, benign breast tissue of the left breast, and no evidence of malignancy in the sentinel lymph node. Full pathology report below    1. RIGHT BREAST, MASTECTOMY: Invasive ductal carcinoma (see synoptic report)  2. RIGHT AXILLARY SENTINEL LYMPH NODE, BIOPSY: One lymph node, no tumor present on H and E  and using cytokeratin immunohistochemistry (AE1/AE3, CAM5.2, WSK) with appropriate positive and  negative controls (0/1)  3. LEFT BREAST, MASTECTOMY: Benign breast tissue with associated microcalcifications    INTERVAL HISTORY:   Earl Abdul comes in for a post-op check.  She denies fever, chills, chest pain or shortness of breath.  Her pain is well controlled.      MEDICATIONS:  Current Outpatient Medications   Medication Sig Dispense Refill    acetaminophen (TYLENOL) 325 MG tablet Take 2 tablets (650 mg total) by mouth every 6 (six) hours as needed.  0    ARIPiprazole (ABILIFY) 2 MG Tab 2 mg every evening.       dronabinol (MARINOL) 2.5 MG capsule Take 1 capsule (2.5 mg total) by mouth 2 (two) times daily before meals. 20 capsule 0    gabapentin (NEURONTIN) 600 MG tablet Take 600 mg by mouth 3 (three) times daily.       ipratropium (ATROVENT HFA) 17 mcg/actuation inhaler Inhale 2 puffs into the lungs every 6 (six) hours. Rescue for 14 days 1 Inhaler 0    levothyroxine (SYNTHROID) 25 MCG tablet Take 1 tablet (25 mcg total) by mouth before breakfast. 30 tablet 0    omeprazole (PRILOSEC) 20 MG capsule Take 60 mg by mouth once  daily.       ondansetron (ZOFRAN ODT) 8 MG TbDL Take 1 tablet (8 mg total) by mouth 3 (three) times daily as needed (for nausea and vomiting). 30 tablet 0    oxyCODONE (ROXICODONE) 5 MG immediate release tablet Take 2 tablets (10 mg total) by mouth every 12 (twelve) hours as needed for Pain. 28 tablet 0    rOPINIRole (REQUIP) 0.25 MG tablet every evening.       traZODone (DESYREL) 100 MG tablet TK 3 TS PO HS  1    valsartan (DIOVAN) 20 MG tablet Take 20 mg by mouth once daily.       No current facility-administered medications for this visit.        ALLERGIES:   Review of patient's allergies indicates:   Allergen Reactions    Fentanyl Other (See Comments)     Other reaction(s): Itching    Lortab  [hydrocodone-acetaminophen] Other (See Comments)    Phenothiazines        PHYSICAL EXAMINATION:   General:  This is a well appearing female with appropriate speech, affect and gait.     Breast:  Incision clean, dry, and intact    IMPRESSION:   The patient has had an uneventful postoperative course.    PLAN:   1. return in 6 months for a follow up office visit and breast exam  2. The patient is advised in continued exam of the breast chest wall and to report to this office sooner should she note any areas of abnormality or concern.   3.  She has been instructed to meet with med onc and rad onc for discussion of adjuvant treatment recommendations

## 2020-11-11 ENCOUNTER — OFFICE VISIT (OUTPATIENT)
Dept: SURGERY | Facility: CLINIC | Age: 60
End: 2020-11-11
Payer: COMMERCIAL

## 2020-11-11 ENCOUNTER — OFFICE VISIT (OUTPATIENT)
Dept: PLASTIC SURGERY | Facility: CLINIC | Age: 60
End: 2020-11-11
Payer: COMMERCIAL

## 2020-11-11 VITALS
DIASTOLIC BLOOD PRESSURE: 65 MMHG | DIASTOLIC BLOOD PRESSURE: 65 MMHG | SYSTOLIC BLOOD PRESSURE: 146 MMHG | WEIGHT: 179.44 LBS | HEIGHT: 66 IN | HEIGHT: 66 IN | HEART RATE: 97 BPM | BODY MASS INDEX: 28.84 KG/M2 | SYSTOLIC BLOOD PRESSURE: 146 MMHG | WEIGHT: 179.44 LBS | BODY MASS INDEX: 28.84 KG/M2 | HEART RATE: 97 BPM

## 2020-11-11 DIAGNOSIS — Z09 SURGERY FOLLOW-UP EXAMINATION: Primary | ICD-10-CM

## 2020-11-11 DIAGNOSIS — Z17.0 MALIGNANT NEOPLASM OF OVERLAPPING SITES OF RIGHT BREAST IN FEMALE, ESTROGEN RECEPTOR POSITIVE: Primary | ICD-10-CM

## 2020-11-11 DIAGNOSIS — C50.811 MALIGNANT NEOPLASM OF OVERLAPPING SITES OF RIGHT BREAST IN FEMALE, ESTROGEN RECEPTOR POSITIVE: Primary | ICD-10-CM

## 2020-11-11 PROCEDURE — 3008F BODY MASS INDEX DOCD: CPT | Mod: CPTII,S$GLB,, | Performed by: SURGERY

## 2020-11-11 PROCEDURE — 1125F PR PAIN SEVERITY QUANTIFIED, PAIN PRESENT: ICD-10-PCS | Mod: S$GLB,,, | Performed by: SURGERY

## 2020-11-11 PROCEDURE — 99024 POSTOP FOLLOW-UP VISIT: CPT | Mod: S$GLB,,, | Performed by: SURGERY

## 2020-11-11 PROCEDURE — 99999 PR PBB SHADOW E&M-EST. PATIENT-LVL IV: CPT | Mod: PBBFAC,,, | Performed by: SURGERY

## 2020-11-11 PROCEDURE — 3008F PR BODY MASS INDEX (BMI) DOCUMENTED: ICD-10-PCS | Mod: CPTII,S$GLB,, | Performed by: SURGERY

## 2020-11-11 PROCEDURE — 1125F AMNT PAIN NOTED PAIN PRSNT: CPT | Mod: S$GLB,,, | Performed by: SURGERY

## 2020-11-11 PROCEDURE — 99024 PR POST-OP FOLLOW-UP VISIT: ICD-10-PCS | Mod: S$GLB,,, | Performed by: SURGERY

## 2020-11-11 PROCEDURE — 99999 PR PBB SHADOW E&M-EST. PATIENT-LVL IV: ICD-10-PCS | Mod: PBBFAC,,, | Performed by: SURGERY

## 2020-11-11 RX ORDER — ASPIRIN 325 MG/1
TABLET, FILM COATED ORAL
Status: ON HOLD | COMMUNITY
Start: 2020-10-20 | End: 2021-04-23 | Stop reason: HOSPADM

## 2020-11-11 RX ORDER — BACLOFEN 20 MG
TABLET ORAL
Status: ON HOLD | COMMUNITY
Start: 2020-10-20 | End: 2021-04-23 | Stop reason: HOSPADM

## 2020-11-11 RX ORDER — FLUOXETINE HYDROCHLORIDE 60 MG/1
60 TABLET, FILM COATED ORAL; ORAL DAILY
Status: ON HOLD | COMMUNITY
Start: 2020-10-31 | End: 2022-05-04 | Stop reason: HOSPADM

## 2020-11-11 RX ORDER — HYDROXYZINE PAMOATE 50 MG/1
50 CAPSULE ORAL EVERY 8 HOURS PRN
Status: ON HOLD | COMMUNITY
Start: 2020-11-05 | End: 2021-04-23 | Stop reason: HOSPADM

## 2020-11-11 RX ORDER — AMLODIPINE BESYLATE 10 MG/1
10 TABLET ORAL NIGHTLY
Status: ON HOLD | COMMUNITY
Start: 2020-10-16 | End: 2021-04-23 | Stop reason: HOSPADM

## 2020-11-11 RX ORDER — CYCLOBENZAPRINE HCL 10 MG
TABLET ORAL
Status: ON HOLD | COMMUNITY
Start: 2020-10-09 | End: 2021-04-23 | Stop reason: HOSPADM

## 2020-11-11 RX ORDER — HYDROXYZINE HYDROCHLORIDE 50 MG/1
TABLET, FILM COATED ORAL
Status: ON HOLD | COMMUNITY
Start: 2020-10-01 | End: 2021-04-23 | Stop reason: HOSPADM

## 2020-11-11 RX ORDER — MULTIVITAMIN
TABLET ORAL
Status: ON HOLD | COMMUNITY
Start: 2020-10-20 | End: 2021-04-23 | Stop reason: HOSPADM

## 2020-11-11 RX ORDER — LORAZEPAM 1 MG/1
TABLET ORAL
COMMUNITY
Start: 2020-10-20 | End: 2021-02-22 | Stop reason: DRUGHIGH

## 2020-11-11 RX ORDER — TRAMADOL HYDROCHLORIDE 50 MG/1
TABLET ORAL
COMMUNITY
Start: 2020-08-28 | End: 2020-12-23

## 2020-11-11 RX ORDER — LOSARTAN POTASSIUM 25 MG/1
25 TABLET ORAL NIGHTLY
Status: ON HOLD | COMMUNITY
Start: 2020-10-16 | End: 2021-04-23 | Stop reason: SDUPTHER

## 2020-11-11 NOTE — PROGRESS NOTES
Subjective:      Earl Abdul is a 60 y.o. year old female who presents to the [facility]astic Surgery Clinic on 11/11/2020 for follow up visit status post B breast reconstruction with TE placement on 10/29/2020 following B mastectomy by Dr Kevin. Two drains in place. Denies fever, chills, nausea or vomiting.    Vitals:    11/11/20 1551   BP: (!) 146/65   Pulse: 97        Review of patient's allergies indicates:   Allergen Reactions    Fentanyl Other (See Comments)     Other reaction(s): Itching    Lortab  [hydrocodone-acetaminophen] Other (See Comments)    Phenothiazines        Current Outpatient Medications on File Prior to Visit   Medication Sig Dispense Refill    acetaminophen (TYLENOL) 325 MG tablet Take 2 tablets (650 mg total) by mouth every 6 (six) hours as needed.  0    amLODIPine (NORVASC) 10 MG tablet TK 1 T PO D      ARIPiprazole (ABILIFY) 2 MG Tab 2 mg every evening.       cyclobenzaprine (FLEXERIL) 10 MG tablet TK 1 T PO Q 8 H PRF SPASMS      dronabinol (MARINOL) 2.5 MG capsule Take 1 capsule (2.5 mg total) by mouth 2 (two) times daily before meals. 20 capsule 0    FLUoxetine 20 MG capsule TK 3 CS PO QD      gabapentin (NEURONTIN) 600 MG tablet Take 600 mg by mouth 3 (three) times daily.       hydrOXYzine (ATARAX) 50 MG tablet TK 1 OR 2 TS PO BID PRN FOR ANXIETY      hydrOXYzine pamoate (VISTARIL) 50 MG Cap       ipratropium (ATROVENT HFA) 17 mcg/actuation inhaler Inhale 2 puffs into the lungs every 6 (six) hours. Rescue for 14 days 1 Inhaler 0    levothyroxine (SYNTHROID) 25 MCG tablet Take 1 tablet (25 mcg total) by mouth before breakfast. 30 tablet 0    LORazepam (ATIVAN) 1 MG tablet FOLLOW ATIVAN 7 DAY TAPER      losartan (COZAAR) 25 MG tablet TK 1 T PO QD      magnesium oxide 500 mg Tab TAKE 1 TABLET BY MOUTH ONCE A DAY AT BEDTIME FOR 3 DAYS (9PM)      multivitamin (THERAGRAN) per tablet TAKE 1 TABLET BY MOUTH ONCE A DAY (12PM)      omeprazole (PRILOSEC) 20 MG capsule Take 60 mg  by mouth once daily.       ondansetron (ZOFRAN ODT) 8 MG TbDL Take 1 tablet (8 mg total) by mouth 3 (three) times daily as needed (for nausea and vomiting). 30 tablet 0    oxyCODONE (ROXICODONE) 5 MG immediate release tablet Take 2 tablets (10 mg total) by mouth every 12 (twelve) hours as needed for Pain. 28 tablet 0    rOPINIRole (REQUIP) 0.25 MG tablet every evening.       thiamine mononitrate, vit B1, (VITAMIN B-1, MONONITRATE,) 100 mg Tab TAKE 1 TABLET BY MOUTH THREE TIMES DAILY FOR 7 DAYS (6AM, 12PM, 6PM)      traMADoL (ULTRAM) 50 mg tablet TK 1 T PO Q 6 H PRN P      traZODone (DESYREL) 100 MG tablet TK 3 TS PO HS  1    valsartan (DIOVAN) 20 MG tablet Take 20 mg by mouth once daily.       No current facility-administered medications on file prior to visit.        Patient Active Problem List   Diagnosis    Depression    Alcohol dependence, continuous    Alcohol withdrawal    Essential hypertension    Sarcoidosis of lung    Fibromyalgia    Tobacco abuse    Alcoholic fatty liver    Cannabis abuse, in remission    Nonspecific abnormal results of liver function study    Sarcoidosis    History of Clostridium difficile colitis    Diarrhea    Dehydration    History of substance abuse    Elevated liver enzymes    Ascites    Cirrhosis    New onset seizure    Posterior reversible encephalopathy syndrome    Depression with anxiety    Erythema nodosum    History of sarcoidosis    Hyperlipidemia    Ramírez's syndrome    Degenerative joint disease (DJD) of lumbar spine    Decreased ROM of lumbar spine    Difficulty walking    Transaminitis    VINICIO (obstructive sleep apnea)    Malignant neoplasm of overlapping sites of right breast in female, estrogen receptor positive    At risk for lymphedema       Past Surgical History:   Procedure Laterality Date    APPENDECTOMY      BILATERAL MASTECTOMY Bilateral 10/29/2020    Procedure: MASTECTOMY, BILATERAL;  Surgeon: Baylee Kevin MD;  [facility];  Service: General;  Laterality: Bilateral;    COLONOSCOPY N/A 7/28/2017    Procedure: COLONOSCOPY;  Surgeon: Aaron Alvarado MD;  Location: [facility];  Service: Endoscopy;  Laterality: N/A;    ESOPHAGOGASTRODUODENOSCOPY  10/7/2016, 11/6/2014    2016 - gastritis, duodenitis, 2014 erosive gastritis    ESOPHAGOGASTRODUODENOSCOPY N/A 2/11/2020    Procedure: ESOPHAGOGASTRODUODENOSCOPY (EGD);  Surgeon: Fawn Garrido MD;  Location: [facility];  Service: Endoscopy;  Laterality: N/A;    FLEXIBLE SIGMOIDOSCOPY  11/06/2014    colitis    HYSTERECTOMY      INJECTION FOR SENTINEL NODE IDENTIFICATION Right 10/29/2020    Procedure: INJECTION, FOR SENTINEL NODE IDENTIFICATION;  Surgeon: Baylee Kevin MD;  Location: [address];  Service: General;  Laterality: Right;    INJECTION OF JOINT Right 10/10/2019    Procedure: Injection, Joint RIGHT ILIOPSOAS BURSA/TENDON INJECTION AND RIGHT GLUTEAL TENDON INJECTION WITH STEROID AND LIDOCAINE;  Surgeon: Guillaume Rico MD;  Location: [facility];  Service: Pain Management;  Laterality: Right;  NEEDS CONSENT    INSERTION OF BREAST TISSUE EXPANDER Bilateral 10/29/2020    Procedure: INSERTION, TISSUE EXPANDER, BREAST;  Surgeon: Scottie Johnson MD;  Location: [address];  Service: Plastics;  Laterality: Bilateral;  Right breast: 1082 g  Left breast: 1076 g    mediastenoscopy      SENTINEL LYMPH NODE BIOPSY Right 10/29/2020    Procedure: BIOPSY, LYMPH NODE, SENTINEL;  Surgeon: Baylee Kevin MD;  Location: [address];  Service: General;  Laterality: Right;    TONSILLECTOMY N/A 1970    TUBAL LIGATION         Social History     Socioeconomic History    Marital status:      Spouse name: Not on file    Number of children: Not on file    Years of education: Not on file    Highest education level: Not on file   Occupational History    Not on file   Social Needs    Financial resource strain: Not on file    Food insecurity     Worry: Not  on file     Inability: Not on file    Transportation needs     Medical: Not on file     Non-medical: Not on file   Tobacco Use    Smoking status: Current Every Day Smoker     Packs/day: 0.50     Years: 30.00     Pack years: 15.00     Types: Cigarettes    Smokeless tobacco: Never Used   Substance and Sexual Activity    Alcohol use: Not Currently     Comment: daily     Drug use: No    Sexual activity: Yes     Birth control/protection: Surgical   Lifestyle    Physical activity     Days per week: Not on file     Minutes per session: Not on file    Stress: Not on file   Relationships    Social connections     Talks on phone: Not on file     Gets together: Not on file     Attends Taoism service: Not on file     Active member of club or organization: Not on file     Attends meetings of clubs or organizations: Not on file     Relationship status: Not on file   Other Topics Concern    Not on file   Social History Narrative    Not on file     Date of surgery 10/29/2020  Preoperative diagnosis breast cancer  Postoperative diagnosis the same  Procedure performed is  1.  Immediate bilateral breast reconstruction using tissue expanders  Surgeon Alex  Anesthesia general  Complications none  Drains x4  Blood loss minimal.      Review of Systems: negative    Objective:     Physical Exam:  Vitals:    11/11/20 1551   BP: (!) 146/65   Pulse: 97       WD WN NAD  VSS  Normal resp effort  R breast - incision CDI, no erythema or drainage, mastectomy flaps viable, surgically absent nipple, drain to bulb suction x 1  L breast - incision CDI, no erythema or drainage, mastectomy flaps viable, surgically absent nipple, drain to bulb suction x 1        Assessment:       No diagnosis found.    Plan:   60 y.o. female status post B breast reconstruction with TE placement  - Patient seen and evaluated by myself and Dr. Scottie Johnson    - L/R expanders filled with 150cc of air  - DermaBond applied to B breast incisions.   - JPs  removed at bedside  - Return to clinic in 2 week, appointment scheduled.       All questions were answered. The patient was advised to call the clinic with any questions or concerns prior to their next visit.         Adolfo Caban  Plastic Surgery Fellow

## 2020-11-13 ENCOUNTER — DOCUMENTATION ONLY (OUTPATIENT)
Dept: SURGERY | Facility: CLINIC | Age: 60
End: 2020-11-13

## 2020-11-13 NOTE — NURSING
Met with patient at post op visit, patient is doing well.  Patient scheduled to see medical oncology on 11/19/2020.  Oncology Navigation   Intake  Date of Diagnosis: 09/29/20  Cancer Type: Breast  Internal / External Referral: Internal  Initial Nurse Navigator Contact: 10/02/20  Date Worked: 10/12/20  First Appointment Available: 10/06/20  Appointment Date: 10/06/20  Schedule to Appointment Timeline (days): 4  First Available Date vs. Scheduled Date (days): 0     Treatment  Current Status: Staging work-up    Surgery: Completed  Surgical Oncologist: Dr. Kevin  Plastic Surgeon: Dr. Johnson  Type of Surgery: Bilateral mastectomy with SLNB  Surgery Schedule Date: 10/29/20    Medical Oncologist: [facility]  Consult Date: 11/19/20       Procedures: Genetic test  Biopsy Schedule Date: 09/29/20  Genetic Testing Date Sent: 10/06/20 ([employer])    General Referrals: Other  Other Referral: Plastic surgery- on 10/14/2020    ER: Positive  ND: Positive       Support Systems: Spouse/significant other     Acuity      Follow Up  No follow-ups on file.

## 2020-11-14 ENCOUNTER — NURSE TRIAGE (OUTPATIENT)
Dept: ADMINISTRATIVE | Facility: CLINIC | Age: 60
End: 2020-11-14

## 2020-11-14 ENCOUNTER — HOSPITAL ENCOUNTER (EMERGENCY)
Facility: HOSPITAL | Age: 60
Discharge: HOME | End: 2020-11-14
Attending: EMERGENCY MEDICINE
Payer: COMMERCIAL

## 2020-11-14 VITALS
HEIGHT: 66 IN | DIASTOLIC BLOOD PRESSURE: 63 MMHG | TEMPERATURE: 99 F | HEART RATE: 92 BPM | SYSTOLIC BLOOD PRESSURE: 147 MMHG | WEIGHT: 179.44 LBS | BODY MASS INDEX: 28.84 KG/M2 | OXYGEN SATURATION: 92 % | RESPIRATION RATE: 24 BRPM

## 2020-11-14 DIAGNOSIS — N64.4 BREAST PAIN: Primary | ICD-10-CM

## 2020-11-14 DIAGNOSIS — T81.40XA POSTOPERATIVE INFECTION, UNSPECIFIED TYPE, INITIAL ENCOUNTER: ICD-10-CM

## 2020-11-14 DIAGNOSIS — G89.18 POST-OP PAIN: ICD-10-CM

## 2020-11-14 DIAGNOSIS — R42 DIZZINESS: ICD-10-CM

## 2020-11-14 LAB
ALBUMIN SERPL BCP-MCNC: 2.9 G/DL (ref 3.5–5.2)
ALP SERPL-CCNC: 55 U/L (ref 55–135)
ALT SERPL W/O P-5'-P-CCNC: 20 U/L (ref 10–44)
ANION GAP SERPL CALC-SCNC: 9 MMOL/L (ref 8–16)
ANISOCYTOSIS BLD QL SMEAR: SLIGHT
AST SERPL-CCNC: 32 U/L (ref 10–40)
BASOPHILS # BLD AUTO: 0.02 K/UL (ref 0–0.2)
BASOPHILS NFR BLD: 0.4 % (ref 0–1.9)
BILIRUB SERPL-MCNC: 0.2 MG/DL (ref 0.1–1)
BILIRUB UR QL STRIP: NEGATIVE
BUN SERPL-MCNC: 15 MG/DL (ref 8–23)
BUN SERPL-MCNC: 22 MG/DL (ref 6–30)
CALCIUM SERPL-MCNC: 8.3 MG/DL (ref 8.7–10.5)
CHLORIDE SERPL-SCNC: 105 MMOL/L (ref 95–110)
CHLORIDE SERPL-SCNC: 105 MMOL/L (ref 95–110)
CLARITY UR REFRACT.AUTO: CLEAR
CO2 SERPL-SCNC: 23 MMOL/L (ref 23–29)
COLOR UR AUTO: YELLOW
CREAT SERPL-MCNC: 0.8 MG/DL (ref 0.5–1.4)
CREAT SERPL-MCNC: 1 MG/DL (ref 0.5–1.4)
CRP SERPL-MCNC: 7.2 MG/L (ref 0–8.2)
DIFFERENTIAL METHOD: ABNORMAL
EOSINOPHIL # BLD AUTO: 0.1 K/UL (ref 0–0.5)
EOSINOPHIL NFR BLD: 2 % (ref 0–8)
ERYTHROCYTE [DISTWIDTH] IN BLOOD BY AUTOMATED COUNT: 16 % (ref 11.5–14.5)
ERYTHROCYTE [SEDIMENTATION RATE] IN BLOOD BY WESTERGREN METHOD: 48 MM/HR (ref 0–36)
EST. GFR  (AFRICAN AMERICAN): >60 ML/MIN/1.73 M^2
EST. GFR  (NON AFRICAN AMERICAN): >60 ML/MIN/1.73 M^2
GLUCOSE SERPL-MCNC: 122 MG/DL (ref 70–110)
GLUCOSE SERPL-MCNC: 134 MG/DL (ref 70–110)
GLUCOSE UR QL STRIP: NEGATIVE
HCT VFR BLD AUTO: 33.6 % (ref 37–48.5)
HCT VFR BLD CALC: 33 %PCV (ref 36–54)
HGB BLD-MCNC: 10.1 G/DL (ref 12–16)
HGB UR QL STRIP: NEGATIVE
HYPOCHROMIA BLD QL SMEAR: ABNORMAL
IMM GRANULOCYTES # BLD AUTO: 0.01 K/UL (ref 0–0.04)
IMM GRANULOCYTES NFR BLD AUTO: 0.2 % (ref 0–0.5)
KETONES UR QL STRIP: NEGATIVE
LACTATE SERPL-SCNC: 1.4 MMOL/L (ref 0.5–2.2)
LACTATE SERPL-SCNC: 2.1 MMOL/L (ref 0.5–2.2)
LEUKOCYTE ESTERASE UR QL STRIP: NEGATIVE
LYMPHOCYTES # BLD AUTO: 2.7 K/UL (ref 1–4.8)
LYMPHOCYTES NFR BLD: 50.4 % (ref 18–48)
MCH RBC QN AUTO: 25.3 PG (ref 27–31)
MCHC RBC AUTO-ENTMCNC: 30.1 G/DL (ref 32–36)
MCV RBC AUTO: 84 FL (ref 82–98)
MONOCYTES # BLD AUTO: 0.4 K/UL (ref 0.3–1)
MONOCYTES NFR BLD: 8 % (ref 4–15)
NEUTROPHILS # BLD AUTO: 2.1 K/UL (ref 1.8–7.7)
NEUTROPHILS NFR BLD: 39 % (ref 38–73)
NITRITE UR QL STRIP: NEGATIVE
NRBC BLD-RTO: 0 /100 WBC
OVALOCYTES BLD QL SMEAR: ABNORMAL
PH UR STRIP: 5 [PH] (ref 5–8)
PLATELET # BLD AUTO: 213 K/UL (ref 150–350)
PMV BLD AUTO: 10.4 FL (ref 9.2–12.9)
POC IONIZED CALCIUM: 1.11 MMOL/L (ref 1.06–1.42)
POC TCO2 (MEASURED): 27 MMOL/L (ref 23–29)
POIKILOCYTOSIS BLD QL SMEAR: SLIGHT
POLYCHROMASIA BLD QL SMEAR: ABNORMAL
POTASSIUM BLD-SCNC: 5.3 MMOL/L (ref 3.5–5.1)
POTASSIUM SERPL-SCNC: 4.7 MMOL/L (ref 3.5–5.1)
PROT SERPL-MCNC: 5.7 G/DL (ref 6–8.4)
PROT UR QL STRIP: NEGATIVE
RBC # BLD AUTO: 3.99 M/UL (ref 4–5.4)
SAMPLE: ABNORMAL
SODIUM BLD-SCNC: 138 MMOL/L (ref 136–145)
SODIUM SERPL-SCNC: 137 MMOL/L (ref 136–145)
SP GR UR STRIP: 1.01 (ref 1–1.03)
URN SPEC COLLECT METH UR: NORMAL
WBC # BLD AUTO: 5.4 K/UL (ref 3.9–12.7)

## 2020-11-14 PROCEDURE — 83605 ASSAY OF LACTIC ACID: CPT

## 2020-11-14 PROCEDURE — 25000003 PHARM REV CODE 250: Performed by: EMERGENCY MEDICINE

## 2020-11-14 PROCEDURE — 96361 HYDRATE IV INFUSION ADD-ON: CPT

## 2020-11-14 PROCEDURE — 63600175 PHARM REV CODE 636 W HCPCS: Performed by: EMERGENCY MEDICINE

## 2020-11-14 PROCEDURE — 93010 ELECTROCARDIOGRAM REPORT: CPT | Mod: ,,, | Performed by: INTERNAL MEDICINE

## 2020-11-14 PROCEDURE — 87040 BLOOD CULTURE FOR BACTERIA: CPT | Mod: 59

## 2020-11-14 PROCEDURE — 85652 RBC SED RATE AUTOMATED: CPT

## 2020-11-14 PROCEDURE — 93010 EKG 12-LEAD: ICD-10-PCS | Mod: ,,, | Performed by: INTERNAL MEDICINE

## 2020-11-14 PROCEDURE — 99285 PR EMERGENCY DEPT VISIT,LEVEL V: ICD-10-PCS | Mod: ,,, | Performed by: EMERGENCY MEDICINE

## 2020-11-14 PROCEDURE — 85025 COMPLETE CBC W/AUTO DIFF WBC: CPT

## 2020-11-14 PROCEDURE — 81003 URINALYSIS AUTO W/O SCOPE: CPT

## 2020-11-14 PROCEDURE — 93005 ELECTROCARDIOGRAM TRACING: CPT

## 2020-11-14 PROCEDURE — 96366 THER/PROPH/DIAG IV INF ADDON: CPT

## 2020-11-14 PROCEDURE — 96365 THER/PROPH/DIAG IV INF INIT: CPT | Mod: 59

## 2020-11-14 PROCEDURE — 99285 EMERGENCY DEPT VISIT HI MDM: CPT | Mod: 25

## 2020-11-14 PROCEDURE — 96375 TX/PRO/DX INJ NEW DRUG ADDON: CPT

## 2020-11-14 PROCEDURE — 99285 EMERGENCY DEPT VISIT HI MDM: CPT | Mod: ,,, | Performed by: EMERGENCY MEDICINE

## 2020-11-14 PROCEDURE — 96367 TX/PROPH/DG ADDL SEQ IV INF: CPT

## 2020-11-14 PROCEDURE — 86140 C-REACTIVE PROTEIN: CPT

## 2020-11-14 PROCEDURE — 80053 COMPREHEN METABOLIC PANEL: CPT

## 2020-11-14 PROCEDURE — 80047 BASIC METABLC PNL IONIZED CA: CPT

## 2020-11-14 PROCEDURE — 25500020 PHARM REV CODE 255: Performed by: EMERGENCY MEDICINE

## 2020-11-14 RX ORDER — MORPHINE SULFATE 2 MG/ML
6 INJECTION, SOLUTION INTRAMUSCULAR; INTRAVENOUS
Status: COMPLETED | OUTPATIENT
Start: 2020-11-14 | End: 2020-11-14

## 2020-11-14 RX ORDER — ACETAMINOPHEN 325 MG/1
650 TABLET ORAL EVERY 6 HOURS PRN
Qty: 30 TABLET | Refills: 0 | Status: SHIPPED | OUTPATIENT
Start: 2020-11-14 | End: 2020-11-14 | Stop reason: SDUPTHER

## 2020-11-14 RX ORDER — ACETAMINOPHEN 325 MG/1
650 TABLET ORAL EVERY 6 HOURS PRN
Qty: 30 TABLET | Refills: 0 | Status: ON HOLD | OUTPATIENT
Start: 2020-11-14 | End: 2021-04-23 | Stop reason: HOSPADM

## 2020-11-14 RX ORDER — MORPHINE SULFATE 15 MG/1
15 TABLET ORAL EVERY 4 HOURS PRN
Qty: 8 TABLET | Refills: 0 | Status: SHIPPED | OUTPATIENT
Start: 2020-11-14 | End: 2020-11-14 | Stop reason: SDUPTHER

## 2020-11-14 RX ORDER — CLINDAMYCIN HYDROCHLORIDE 150 MG/1
300 CAPSULE ORAL 4 TIMES DAILY
Qty: 56 CAPSULE | Refills: 0 | Status: SHIPPED | OUTPATIENT
Start: 2020-11-14 | End: 2020-11-21

## 2020-11-14 RX ORDER — MORPHINE SULFATE 15 MG/1
15 TABLET ORAL EVERY 4 HOURS PRN
Qty: 8 TABLET | Refills: 0 | Status: SHIPPED | OUTPATIENT
Start: 2020-11-14 | End: 2020-12-23

## 2020-11-14 RX ADMIN — SODIUM CHLORIDE, SODIUM LACTATE, POTASSIUM CHLORIDE, AND CALCIUM CHLORIDE 1000 ML: .6; .31; .03; .02 INJECTION, SOLUTION INTRAVENOUS at 08:11

## 2020-11-14 RX ADMIN — MORPHINE SULFATE 6 MG: 2 INJECTION, SOLUTION INTRAMUSCULAR; INTRAVENOUS at 06:11

## 2020-11-14 RX ADMIN — SODIUM CHLORIDE, SODIUM LACTATE, POTASSIUM CHLORIDE, AND CALCIUM CHLORIDE 1000 ML: .6; .31; .03; .02 INJECTION, SOLUTION INTRAVENOUS at 06:11

## 2020-11-14 RX ADMIN — VANCOMYCIN HYDROCHLORIDE 1750 MG: 10 INJECTION, POWDER, LYOPHILIZED, FOR SOLUTION INTRAVENOUS at 09:11

## 2020-11-14 RX ADMIN — PIPERACILLIN AND TAZOBACTAM 4.5 G: 4; .5 INJECTION, POWDER, LYOPHILIZED, FOR SOLUTION INTRAVENOUS; PARENTERAL at 08:11

## 2020-11-14 RX ADMIN — IOHEXOL 75 ML: 350 INJECTION, SOLUTION INTRAVENOUS at 08:11

## 2020-11-14 NOTE — TELEPHONE ENCOUNTER
Spoke with patient she states that she is feeling lightheaded.  Reports that she has been having ringing in her ears and feeling like she wants to pass out since yesterday.  Patient denies that she feels like she wants to pass out at this time.  Current B/P 114/52, Pulse 93.  Her  states she had a double mastectomy 2 weeks ago and is retaining fluid in her back and arms.  Advised patient to go to ER and have another adult drive.  Also advised her to call [phone] if symptoms worsen.  Patient verbalized understanding.     Reason for Disposition   SEVERE dizziness (e.g., unable to stand, requires support to walk, feels like passing out now)    Additional Information   Negative: Severe difficulty breathing (e.g., struggling for each breath, speaks in single words)   Negative: [1] Difficulty breathing or swallowing AND [2] started suddenly after medicine, an allergic food or bee sting   Negative: Shock suspected (e.g., cold/pale/clammy skin, too weak to stand, low BP, rapid pulse)   Negative: Difficult to awaken or acting confused (e.g., disoriented, slurred speech)   Negative: [1] Weakness (i.e., paralysis, loss of muscle strength) of the face, arm or leg on one side of the body AND [2] sudden onset AND [3] present now   Negative: [1] Numbness (i.e., loss of sensation) of the face, arm or leg on one side of the body AND [2] sudden onset AND [3] present now   Negative: [1] Loss of speech or garbled speech AND [2] sudden onset AND [3] present now   Negative: Overdose (accidental or intentional) of medications   Negative: [1] Fainted > 15 minutes ago AND [2] still feels too weak or dizzy to stand   Negative: Heart beating < 50 beats per minute OR > 140 beats per minute   Negative: Sounds like a life-threatening emergency to the triager   Negative: Difficulty breathing    Protocols used: DIZZINESS - [id number]

## 2020-11-14 NOTE — ED PROVIDER NOTES
Encounter Date: 11/14/2020       History     Chief Complaint   Patient presents with    Post-op Problem     pt arrives with c/o lightheadness and fatigue states had double mastectomy about 2 weeks and had ROSA ELENA drains removed this wednesday and swollen where drains were with some drainge coming form right site     HPI   Ms. Abdul is a 60 y.o. female with HTN, h/o sarcoidosis, recent bilateral mastectomy here today with breast pain and dizziness.  Patient reports that about 2 weeks ago she had a double mastectomy.  A few days ago she had her drains removed.  Since that she has had worsening drainage and swelling from the surgical site bilaterally, worse on the right than the left.  Has some increased redness as well without drainage.  Over the past day or 2, she has had worsening lightheadedness and fatigue.  Gets very lightheaded with standing.  Has had decreased p.o. intake due to decreased appetite.  Fevers, chills, nausea, vomiting abdominal pain, cough, shortness of breath, numbness, tingling, weakness, headache, blurry vision.     Review of patient's allergies indicates:   Allergen Reactions    Fentanyl Other (See Comments)     Other reaction(s): Itching    Lortab  [hydrocodone-acetaminophen] Other (See Comments)    Phenothiazines      Past Medical History:   Diagnosis Date    Anemia     Anemia     Depression     Diverticulitis     Fatty liver     GERD (gastroesophageal reflux disease)     Hyperlipidemia     Hypertension     Pancreatitis     Peptic ulcer disease     Polysubstance abuse     Posterior reversible encephalopathy syndrome     Sarcoidosis of lung     Sarcoidosis of lung     over 30 yrs ago    Seizures     7/2017    Suicide attempt     Suicide ideation      Past Surgical History:   Procedure Laterality Date    APPENDECTOMY      BILATERAL MASTECTOMY Bilateral 10/29/2020    Procedure: MASTECTOMY, BILATERAL;  Surgeon: Baylee Kevin MD;  Location: [facility] OR [address] [place] [address];  Service: General;   Laterality: Bilateral;    COLONOSCOPY N/A 7/28/2017    Procedure: COLONOSCOPY;  Surgeon: Aaron Alvarado MD;  Location: [facility] ENDO;  Service: Endoscopy;  Laterality: N/A;    ESOPHAGOGASTRODUODENOSCOPY  10/7/2016, 11/6/2014    2016 - gastritis, duodenitis, 2014 erosive gastritis    ESOPHAGOGASTRODUODENOSCOPY N/A 2/11/2020    Procedure: ESOPHAGOGASTRODUODENOSCOPY (EGD);  Surgeon: Fawn Garrido MD;  Location: [facility] ENDO;  Service: Endoscopy;  Laterality: N/A;    FLEXIBLE SIGMOIDOSCOPY  11/06/2014    colitis    HYSTERECTOMY      INJECTION FOR SENTINEL NODE IDENTIFICATION Right 10/29/2020    Procedure: INJECTION, FOR SENTINEL NODE IDENTIFICATION;  Surgeon: Baylee Kevin MD;  Location: [address];  Service: General;  Laterality: Right;    INJECTION OF JOINT Right 10/10/2019    Procedure: Injection, Joint RIGHT ILIOPSOAS BURSA/TENDON INJECTION AND RIGHT GLUTEAL TENDON INJECTION WITH STEROID AND LIDOCAINE;  Surgeon: Guillaume Rico MD;  Location: [facility] MGT;  Service: Pain Management;  Laterality: Right;  NEEDS CONSENT    INSERTION OF BREAST TISSUE EXPANDER Bilateral 10/29/2020    Procedure: INSERTION, TISSUE EXPANDER, BREAST;  Surgeon: Scottie Johnson MD;  Location: [address];  Service: Plastics;  Laterality: Bilateral;  Right breast: 1082 g  Left breast: 1076 g    mediastenoscopy      SENTINEL LYMPH NODE BIOPSY Right 10/29/2020    Procedure: BIOPSY, LYMPH NODE, SENTINEL;  Surgeon: Baylee Kevin MD;  Location: [address];  Service: General;  Laterality: Right;    TONSILLECTOMY N/A 1970    TUBAL LIGATION       Family History   Problem Relation Age of Onset    Heart attack Father     Diabetes Father     Hypertension Father     Diabetes Mother     Hypertension Mother     Breast cancer Maternal Aunt     Colon cancer Maternal Uncle     Breast cancer Daughter     Ovarian cancer Neg Hx      Social History     Tobacco Use    Smoking status: Current Every Day Smoker     Packs/day:  0.50     Years: 30.00     Pack years: 15.00     Types: Cigarettes    Smokeless tobacco: Never Used   Substance Use Topics    Alcohol use: Not Currently     Comment: daily     Drug use: No     Review of Systems   Constitutional: Positive for fatigue. Negative for fever.   HENT: Negative for sore throat.    Respiratory: Negative for shortness of breath.    Cardiovascular: Positive for chest pain (Wound pain).   Gastrointestinal: Negative for abdominal distention, abdominal pain, nausea and vomiting.   Genitourinary: Negative for dysuria.   Musculoskeletal: Negative for back pain.   Skin: Positive for color change and wound. Negative for rash.   Neurological: Positive for dizziness and light-headedness. Negative for tremors, seizures, syncope, facial asymmetry, speech difficulty, weakness and headaches.   Hematological: Does not bruise/bleed easily.       Physical Exam     Initial Vitals [11/14/20 1648]   BP Pulse Resp Temp SpO2   (!) 115/53 102 18 98.7 °F (37.1 °C) (!) 93 %      MAP       --         Physical Exam    Nursing note and vitals reviewed.  Constitutional: She appears well-developed and well-nourished. She is not diaphoretic. No distress.   HENT:   Head: Normocephalic and atraumatic.   Right Ear: External ear normal.   Left Ear: External ear normal.   Neck: Neck supple.   Cardiovascular: Regular rhythm, normal heart sounds and intact distal pulses.   Tachycardic.   Pulmonary/Chest: Breath sounds normal. No respiratory distress. She has no wheezes. She has no rhonchi. She has no rales.   Abdominal: Soft. She exhibits no distension. There is no abdominal tenderness. There is no rebound and no guarding.   Neurological: She is alert and oriented to person, place, and time. She has normal strength. No cranial nerve deficit or sensory deficit. GCS score is 15. GCS eye subscore is 4. GCS verbal subscore is 5. GCS motor subscore is 6.   Becomes dizzy on standing.   Skin: Skin is warm. Capillary refill takes less  than 2 seconds. No rash noted.   Bilateral mastectomy scars healing.  Has tenderness to palpation bilaterally had lateral most portions.  No active drainage.  Mild erythema and swelling slightly worse on right than left.   Psychiatric: She has a normal mood and affect.         ED Course   Procedures  Labs Reviewed   CBC W/ AUTO DIFFERENTIAL - Abnormal; Notable for the following components:       Result Value    RBC 3.99 (*)     Hemoglobin 10.1 (*)     Hematocrit 33.6 (*)     MCH 25.3 (*)     MCHC 30.1 (*)     RDW 16.0 (*)     Lymph % 50.4 (*)     All other components within normal limits   SEDIMENTATION RATE - Abnormal; Notable for the following components:    Sed Rate 48 (*)     All other components within normal limits   COMPREHENSIVE METABOLIC PANEL - Abnormal; Notable for the following components:    Glucose 122 (*)     Calcium 8.3 (*)     Total Protein 5.7 (*)     Albumin 2.9 (*)     All other components within normal limits   ISTAT PROCEDURE - Abnormal; Notable for the following components:    POC Glucose 134 (*)     POC Potassium 5.3 (*)     POC Hematocrit 33 (*)     All other components within normal limits   CULTURE, BLOOD    Narrative:     Aerobic and anaerobic   CULTURE, BLOOD    Narrative:     Aerobic and anaerobic   LACTIC ACID, PLASMA   URINALYSIS, REFLEX TO URINE CULTURE    Narrative:     Specimen Source->Urine   LACTIC ACID, PLASMA   C-REACTIVE PROTEIN        ECG Results          EKG 12-lead (Final result)  Result time 11/15/20 10:51:13    Final result by Interface, Lab In [facility] (11/15/20 10:51:13)                 Narrative:    Test Reason : R42,    Vent. Rate : 103 BPM     Atrial Rate : 103 BPM     P-R Int : 140 ms          QRS Dur : 080 ms      QT Int : 370 ms       P-R-T Axes : 088 080 059 degrees     QTc Int : 484 ms    Sinus tachycardia  Otherwise normal ECG  When compared with ECG of 26-OCT-2020 12:18,  Vent. rate has increased BY  34 BPM  QT has lengthened  Confirmed by MARTHA BECERRA, TATY  ([id number]) on 11/15/2020 10:51:01 AM    Referred By: [facility]   SELF           Confirmed By:TATY THOMAS MD                            Imaging Results          CT Chest With Contrast (Final result)  Result time 11/14/20 20:56:55    Final result by Boubacar Rob MD (11/14/20 20:56:55)                 Impression:      Postoperative changes of previous bilateral mastectomy with placement of breast expanders.  Nonspecific fluid collections along the posterolateral aspect of both breast expanders as discussed in the body of the report.  Hematoma/seroma or abscess could have this appearance.    Multiple small ground-glass opacities in the right lung measuring up to 1.2 cm.  Findings are new when compared with 08/29/2020, which may favor a low-grade infectious or inflammatory process.  Recommend follow-up with noncontrast chest CT in 6-12 months to evaluate for resolution.    Mildly enlarged nolvia hepatis lymph nodes and axillary lymph nodes, similar when compared with prior CT.    Electronically signed by resident: Kvng Martinez  Date:    11/14/2020  Time:    20:14    Electronically signed by: Boubacar Rob MD  Date:    11/14/2020  Time:    20:56             Narrative:    EXAMINATION:  CT CHEST WITH CONTRAST    CLINICAL HISTORY:  abscess;    TECHNIQUE:  Low dose axial images, sagittal and coronal reformations were obtained from the thoracic inlet to the lung bases following the IV administration of 75 mL of Omnipaque 350.    COMPARISON:  Chest radiograph 11/14/2020; MRI lumbar spine with/without contrast 10/27/2020 CT chest abdomen pelvis with contrast 08/29/2020.    FINDINGS:  Base of Neck: No significant abnormality.    Thoracic soft tissues: There are postoperative changes from recent bilateral mastectomy with breast tissue expanders overlying the pectoralis muscles bilaterally.  There is some fat stranding along the lateral margins of bilateral breast expanders extending to the axillary regions with some small  foci of subcutaneous emphysema on the left.  Additional air surrounding the periphery of the left breast expander.  Relatively organized fluid collection along the left posterior aspect of the left breast expander measures approximately 5 x 3 x 7 cm (axial series 2, image 62 and coronal image 84).  Small amount of air is present within this collection.  Fluid collection along the posterolateral aspect of the right breast expander measures approximately 5 x 3 x 6 cm (axial series 2, image 41 and coronal image 82).  Skin thickening overlying both breasts.  Minimally prominent axillary lymph nodes.    Aorta: Left-sided aortic arch.  No aneurysm and no significant atherosclerosis    Heart: Normal size. No effusion.    Pulmonary vasculature: Pulmonary arteries distribute normally.  There are four pulmonary veins.    Rasheeda/Mediastinum: No pathologic екатерина enlargement.    Airways: Patent.    Lungs/Pleura: Lungs are symmetrically expanded.  Several scattered ground-glass opacities throughout the right upper and middle lobe measuring up to 1.2 cm in greatest size (axial series 302, image 200).  No pleural effusion or thickening.    Esophagus: Unremarkable.    Upper Abdomen: No acute abnormality in the upper abdomen.  Several prominent nolvia hepatis lymph nodes.  Spleen is enlarged.    Bones: No acute fracture. No suspicious lytic or sclerotic lesions.                               X-Ray Chest AP Portable (Final result)  Result time 11/14/20 18:29:54    Final result by Shon Boles MD (11/14/20 18:29:54)                 Impression:      1. No acute cardiopulmonary process.      Electronically signed by: Shon Boles MD  Date:    11/14/2020  Time:    18:29             Narrative:    EXAMINATION:  XR CHEST AP PORTABLE    CLINICAL HISTORY:  Sepsis;    TECHNIQUE:  Single frontal view of the chest was performed.    COMPARISON:  06/16/2020    FINDINGS:  The cardiomediastinal silhouette is not enlarged noting calcification of  the aorta..  There is no pleural effusion.  The trachea is midline.  The lungs are symmetrically expanded bilaterally without evidence of acute parenchymal process. No large focal consolidation seen.  There is no pneumothorax.  The osseous structures are remarkable for degenerative change.  Breast expanders noted..                                 Medical Decision Making:   History:   Old Medical Records: I decided to obtain old medical records.  Old Records Summarized: other records.       <> Summary of Records: Recently had a bilateral mastectomy.  Independently Interpreted Test(s):   I have ordered and independently interpreted EKG Reading(s) - see summary below       <> Summary of EKG Reading(s): Sinus tachycardia rate 103.  Baseline wander present.  Allowing for this, no ischemic changes or arrhythmia.  No STEMI.  Clinical Tests:   Lab Tests: Ordered and Reviewed  Radiological Study: Ordered and Reviewed  ED Management:  Tachycardic. Afebrile. Here w/ bilateral breast pain, post operatively. Had worsening swelling and edema. Mildly concerned for possible infection bilaterally, worse on right than left.  Septic workup initiated with blood cultures, lactate, ESR, CRP, CBC, CMP, UA, CXR.  Will obtain CT chest to rule out large abscess formation.  IV morphine given for pain.  Orthostatics obtained and fluid bolus given.    Orthostatics positive.  Additional 1 L LR given.    Lactate returned mildly elevated to 2.1. Empiric antibiotics (vanc/zosyn) given.    Discussed with general surgery who will evaluate.    CT w/ bilateral fluid collections.  Gen sgy also discussed with plastic surgery. They do not believe acute surgical intervention necessary. They recommend outpt management with clindamycin with close outpt f/u as vitals have normalized in ED and there is no leukcytosis.    Signed out to Dr. Rg at shift change to monitor in ED, f/u outstanding labs, and discharge on clindamycin.  Other:   I have discussed this  case with another health care provider.       <> Summary of the Discussion: General surgery and oncoming ED physician                             Clinical Impression:       ICD-10-CM ICD-9-CM   1. Breast pain  N64.4 611.71   2. Dizziness  R42 780.4   3. Post-op pain  G89.18 338.18   4. Postoperative infection, unspecified type, initial encounter  T81.40XA 998.59                          ED Disposition Condition    Discharge Stable        ED Prescriptions     Medication Sig Dispense Start Date End Date Auth. Provider    clindamycin (CLEOCIN) 150 MG capsule Take 2 capsules (300 mg total) by mouth 4 (four) times daily. for 7 days 56 capsule 11/14/2020 11/21/2020 Fredrick Rg MD    acetaminophen (TYLENOL) 325 MG tablet  (Status: Discontinued) Take 2 tablets (650 mg total) by mouth every 6 (six) hours as needed. 30 tablet 11/14/2020 11/14/2020 Fredrick Rg MD    morphine (MSIR) 15 MG tablet  (Status: Discontinued) Take 1 tablet (15 mg total) by mouth every 4 (four) hours as needed for Pain. 8 tablet 11/14/2020 11/14/2020 Fredrick Rg MD    acetaminophen (TYLENOL) 325 MG tablet Take 2 tablets (650 mg total) by mouth every 6 (six) hours as needed. 30 tablet 11/14/2020  Fredrick Rg MD    morphine (MSIR) 15 MG tablet Take 1 tablet (15 mg total) by mouth every 4 (four) hours as needed for Pain. 8 tablet 11/14/2020  Fredrick Rg MD        Follow-up Information     Follow up With Specialties Details Why Contact Info Additional Information    [facility] Plastic Surg [facility] Plastic Surgery In 3 days  [address] [facility] [address] [phone] Main Building, 2nd Floor Please park in [facility] and use escalator or Clinic elevator                                       Torrey White MD  11/16/20 4804

## 2020-11-14 NOTE — ED NOTES
"Patient identifiers for Earl Abdul 60 y.o. female checked and correct.  Chief Complaint   Patient presents with    Post-op Problem     pt arrives with c/o lightheadness and fatigue states had double mastectomy about 2 weeks and had ROSA ELENA drains removed this wednesday and swollen where drains were with some drainge coming form right site     Past Medical History:   Diagnosis Date    Anemia     Anemia     Depression     Diverticulitis     Fatty liver     GERD (gastroesophageal reflux disease)     Hyperlipidemia     Hypertension     Pancreatitis     Peptic ulcer disease     Polysubstance abuse     Posterior reversible encephalopathy syndrome     Sarcoidosis of lung     Sarcoidosis of lung     over 30 yrs ago    Seizures     7/2017    Suicide attempt     Suicide ideation      Allergies reported:   Review of patient's allergies indicates:   Allergen Reactions    Fentanyl Other (See Comments)     Other reaction(s): Itching    Lortab  [hydrocodone-acetaminophen] Other (See Comments)    Phenothiazines          LOC: Patient is awake, alert, and aware of environment with an appropriate affect. Patient is oriented x 4 and speaking appropriately. Reports fatigue.  APPEARANCE: Patient resting comfortably and in no acute distress. Patient is clean and well groomed, patient's clothing is properly fastened.  HEENT: Wearing mask.  SKIN: The skin is warm and dry. Patient has normal skin turgor and moist mucus membranes. Denies fever or chills. Double mastectomy 2 weeks ago. Lymph nodes removed from right side. Patient reports ROSA ELENA drains from each side (below breast) removed on Wednesday. To both sides patient reports swelling (swelling worsened overnight to twice size according to ) and pain 8/10. Reports left side appears closed up, but right is still "oozing". Pink coloring to site.  MUSKULOSKELETAL: Patient is moving all extremities well, no obvious deformities noted. Pulses intact. Ambulatory by " self.  RESPIRATORY: Airway is open and patent. Respirations are spontaneous and non-labored with normal effort and rate. Denies SOB.  CARDIAC: Patient has a tachycardic rate and rhythm. 102 on cardiac monitor. No peripheral edema noted. Denies chest pain.  ABDOMEN: No distention noted. Soft and non-tender upon palpation. Denies n/v/d.  NEUROLOGICAL: pupils 3mm, PERRL. Facial expression is symmetrical. Hand grasps are equal bilaterally. Normal sensation in all extremities when touched with finger. Reports feeling dizzy and light-headed.

## 2020-11-15 NOTE — PROVIDER PROGRESS NOTES - EMERGENCY DEPT.
Encounter Date: 11/14/2020    ED Physician Progress Notes        Physician Note:   Signed out to me.  Patient here with dizziness and breast pain context of recent breast surgery.  Labs showed a lactate of 2.1.  CT scan showed some fluid collection which is nonspecific.  Patient was seen by surgery and cleared for discharge with outpatient follow-up with plastics, do not believe this to be a significant or dangerous infection however they will precautionary discharge the patient with 1 week of clindamycin.  Clinically patient is significantly improved, she would like to go home.  Incidental findings noted, for follow-up as an outpatient.  And states she is out of her pain medications at home.  Will refill very short course.  Patient can obtain further medications from her plastic surgeon.  O2 sat noted, reviewing history patient has a history of baseline lower O2 sats.  Is not significantly different than her baseline.  ED return precautions.

## 2020-11-15 NOTE — ED NOTES
I-STAT Chem-8+ Results:   Value Reference Range   Sodium 138 136-145 mmol/L   Potassium  5.3 3.5-5.1 mmol/L   Chloride 105  mmol/L   Ionized Calcium 1.11 1.06-1.42 mmol/L   CO2 (measured) 27 23-29 mmol/L   Glucose 134  mg/dL   BUN 22 6-30 mg/dL   Creatinine 1.0 0.5-1.4 mg/dL   Hematocrit 33 36-54%

## 2020-11-15 NOTE — CONSULTS
[facility]  General Surgery  Consult Note    Inpatient consult to General Surgery  Consult performed by: Tatiana Timmons MD  Consult ordered by: Torrey White MD        Subjective:     History of Present Illness:   Earl Abdul is a 60 y.o. female s/p bilateral mastectomy who presents with breast pain and dizziness. Patient is extremely anxious. Drains were removed on 11/11/20 and she reports increasing pressure and tenderness since that time. She reports occasional drainage of serous fluid from the drain site. No fever or chills.   Also reports some dizziness with standing; however, states that has improved after fluids. On admission, patient is afebrile and hemodynamically stable. Labs show no leukocytosis. CT scan showed bilateral small fluid collections lateral/posterior to expanders R > L with two foci of air, which is expected after recent drain removal.     No current facility-administered medications on file prior to encounter.      Current Outpatient Medications on File Prior to Encounter   Medication Sig    acetaminophen (TYLENOL) 325 MG tablet Take 2 tablets (650 mg total) by mouth every 6 (six) hours as needed.    amLODIPine (NORVASC) 10 MG tablet TK 1 T PO D    ARIPiprazole (ABILIFY) 2 MG Tab 2 mg every evening.     cyclobenzaprine (FLEXERIL) 10 MG tablet TK 1 T PO Q 8 H PRF SPASMS    dronabinol (MARINOL) 2.5 MG capsule Take 1 capsule (2.5 mg total) by mouth 2 (two) times daily before meals.    FLUoxetine 20 MG capsule TK 3 CS PO QD    gabapentin (NEURONTIN) 600 MG tablet Take 600 mg by mouth 3 (three) times daily.     hydrOXYzine (ATARAX) 50 MG tablet TK 1 OR 2 TS PO BID PRN FOR ANXIETY    hydrOXYzine pamoate (VISTARIL) 50 MG Cap     ipratropium (ATROVENT HFA) 17 mcg/actuation inhaler Inhale 2 puffs into the lungs every 6 (six) hours. Rescue for 14 days    levothyroxine (SYNTHROID) 25 MCG tablet Take 1 tablet (25 mcg total) by mouth before breakfast.     LORazepam (ATIVAN) 1 MG tablet FOLLOW ATIVAN 7 DAY TAPER    losartan (COZAAR) 25 MG tablet TK 1 T PO QD    magnesium oxide 500 mg Tab TAKE 1 TABLET BY MOUTH ONCE A DAY AT BEDTIME FOR 3 DAYS (9PM)    multivitamin (THERAGRAN) per tablet TAKE 1 TABLET BY MOUTH ONCE A DAY (12PM)    omeprazole (PRILOSEC) 20 MG capsule Take 60 mg by mouth once daily.     ondansetron (ZOFRAN ODT) 8 MG TbDL Take 1 tablet (8 mg total) by mouth 3 (three) times daily as needed (for nausea and vomiting).    oxyCODONE (ROXICODONE) 5 MG immediate release tablet Take 2 tablets (10 mg total) by mouth every 12 (twelve) hours as needed for Pain.    rOPINIRole (REQUIP) 0.25 MG tablet every evening.     thiamine mononitrate, vit B1, (VITAMIN B-1, MONONITRATE,) 100 mg Tab TAKE 1 TABLET BY MOUTH THREE TIMES DAILY FOR 7 DAYS (6AM, 12PM, 6PM)    traMADoL (ULTRAM) 50 mg tablet TK 1 T PO Q 6 H PRN P    traZODone (DESYREL) 100 MG tablet TK 3 TS PO HS    valsartan (DIOVAN) 20 MG tablet Take 20 mg by mouth once daily.       Review of patient's allergies indicates:   Allergen Reactions    Fentanyl Other (See Comments)     Other reaction(s): Itching    Lortab  [hydrocodone-acetaminophen] Other (See Comments)    Phenothiazines        Past Medical History:   Diagnosis Date    Anemia     Anemia     Depression     Diverticulitis     Fatty liver     GERD (gastroesophageal reflux disease)     Hyperlipidemia     Hypertension     Pancreatitis     Peptic ulcer disease     Polysubstance abuse     Posterior reversible encephalopathy syndrome     Sarcoidosis of lung     Sarcoidosis of lung     over 30 yrs ago    Seizures     7/2017    Suicide attempt     Suicide ideation      Past Surgical History:   Procedure Laterality Date    APPENDECTOMY      BILATERAL MASTECTOMY Bilateral 10/29/2020    Procedure: MASTECTOMY, BILATERAL;  Surgeon: Baylee Kevin MD;  Location: [facility] OR [address] [place] [address];  Service: General;  Laterality: Bilateral;    COLONOSCOPY  N/A 7/28/2017    Procedure: COLONOSCOPY;  Surgeon: Aaron Alvarado MD;  Location: [facility] ENDO;  Service: Endoscopy;  Laterality: N/A;    ESOPHAGOGASTRODUODENOSCOPY  10/7/2016, 11/6/2014    2016 - gastritis, duodenitis, 2014 erosive gastritis    ESOPHAGOGASTRODUODENOSCOPY N/A 2/11/2020    Procedure: ESOPHAGOGASTRODUODENOSCOPY (EGD);  Surgeon: Fawn Garrido MD;  Location: [facility] ENDO;  Service: Endoscopy;  Laterality: N/A;    FLEXIBLE SIGMOIDOSCOPY  11/06/2014    colitis    HYSTERECTOMY      INJECTION FOR SENTINEL NODE IDENTIFICATION Right 10/29/2020    Procedure: INJECTION, FOR SENTINEL NODE IDENTIFICATION;  Surgeon: Baylee Kevin MD;  Location: [facility] OR [address] [place] [address];  Service: General;  Laterality: Right;    INJECTION OF JOINT Right 10/10/2019    Procedure: Injection, Joint RIGHT ILIOPSOAS BURSA/TENDON INJECTION AND RIGHT GLUTEAL TENDON INJECTION WITH STEROID AND LIDOCAINE;  Surgeon: Guillaume Rico MD;  Location: [facility];  Service: Pain Management;  Laterality: Right;  NEEDS CONSENT    INSERTION OF BREAST TISSUE EXPANDER Bilateral 10/29/2020    Procedure: INSERTION, TISSUE EXPANDER, BREAST;  Surgeon: Scottie Johnson MD;  Location: [address];  Service: Plastics;  Laterality: Bilateral;  Right breast: 1082 g  Left breast: 1076 g    mediastenoscopy      SENTINEL LYMPH NODE BIOPSY Right 10/29/2020    Procedure: BIOPSY, LYMPH NODE, SENTINEL;  Surgeon: Baylee Kevin MD;  Location: [facility] OR [address] [place] [address];  Service: General;  Laterality: Right;    TONSILLECTOMY N/A 1970    TUBAL LIGATION       Family History     Problem Relation (Age of Onset)    Breast cancer Maternal Aunt, Daughter    Colon cancer Maternal Uncle    Diabetes Father, Mother    Heart attack Father    Hypertension Father, Mother        Tobacco Use    Smoking status: Current Every Day Smoker     Packs/day: 0.50     Years: 30.00     Pack years: 15.00     Types: Cigarettes    Smokeless tobacco: Never Used   Substance and Sexual Activity     Alcohol use: Not Currently     Comment: daily     Drug use: No    Sexual activity: Yes     Birth control/protection: Surgical     Review of Systems   Constitutional: Negative for activity change, chills and fever.   Respiratory: Negative for cough and shortness of breath.    Cardiovascular: Negative for chest pain and palpitations.   Gastrointestinal: Negative for abdominal distention, abdominal pain, constipation, diarrhea, nausea and vomiting.   Musculoskeletal: Negative for arthralgias and myalgias.   Neurological: Positive for dizziness. Negative for headaches.   Psychiatric/Behavioral: Negative for agitation and confusion.     Objective:     Vital Signs (Most Recent):  Temp: 98.7 °F (37.1 °C) (11/14/20 1648)  Pulse: 96 (11/14/20 2020)  Resp: (!) 21 (11/14/20 2018)  BP: (!) 130/59 (11/14/20 2020)  SpO2: (!) 93 % (11/14/20 1648) Vital Signs (24h Range):  Temp:  [98.7 °F (37.1 °C)] 98.7 °F (37.1 °C)  Pulse:  [] 96  Resp:  [18-21] 21  SpO2:  [93 %] 93 %  BP: (115-138)/(53-63) 130/59     Weight: 81.4 kg (179 lb 7.3 oz)  Body mass index is 28.96 kg/m².      Intake/Output Summary (Last 24 hours) at 11/14/2020 2102  Last data filed at 11/14/2020 2027  Gross per 24 hour   Intake 1000 ml   Output --   Net 1000 ml       Physical Exam  Constitutional:       General: She is not in acute distress.     Appearance: She is well-developed.   Cardiovascular:      Rate and Rhythm: Normal rate and regular rhythm.   Pulmonary:      Effort: Pulmonary effort is normal. No respiratory distress.   Chest:      Comments: Bilateral mastectomy scars c/d/i with dermabond in place. No erythema of incision. Very minimal erythema at drain exit site. No erythema or induration of tissue. No drainage from incision or drain site. Minimal tenderness to palpation. Expanders and surrounding tissue soft.   Abdominal:      Palpations: Abdomen is soft.   Skin:     General: Skin is warm and dry.   Neurological:      Mental Status: She is alert and  oriented to person, place, and time.   Psychiatric:         Behavior: Behavior normal.         Significant Labs:  CBC:   Recent Labs   Lab 11/14/20  1811 11/14/20  1837   WBC 5.40  --    RBC 3.99*  --    HGB 10.1*  --    HCT 33.6* 33*     --    MCV 84  --    MCH 25.3*  --    MCHC 30.1*  --      CMP:   Recent Labs   Lab 11/14/20 2018   *   CALCIUM 8.3*   ALBUMIN 2.9*   PROT 5.7*      K 4.7   CO2 23      BUN 15   CREATININE 0.8   ALKPHOS 55   ALT 20   AST 32   BILITOT 0.2       Significant Diagnostics:  I have reviewed all pertinent imaging results/findings within the past 24 hours.    Assessment/Plan:     No indication to admit for IV antibiotics  Dispo with 7 days of clindamycin  Follow up in Plastics clinic this week for evaluation and possible aspiration    Discussed with Plastics fellow on call    Tatiana Timmons MD  General Surgery  [facility]

## 2020-11-15 NOTE — DISCHARGE INSTRUCTIONS
Your labs and CT scan did not show any signs of immediately dangerous medical conditions.    There is some fluid buildup in your breast tissue however the surgical team does not think this is consistent with infection or dangerous fluid buildup at this time.  You should follow-up this coming week with your plastic surgeons.    You were discharged with 1 week of antibiotic nevertheless.  Please take it as prescribed.    No dangerous cause for her dizziness was identified.    If develop worsening symptoms, room spinning, inability to walk, weakness in 1 part of your body, strong headache, chest pain, shortness of breath, or any other new, worsening or worrisome symptoms please return to the emergency department immediately for re-evaluation.    Please stay well hydrated.    Please follow-up with your primary care doctor regarding the following incidental findings on your CT scan.  INCIDENTAL FINDING TO FOLLOW UP WITH YOUR PRIMARY CARE DOCTOR:  Multiple small ground-glass opacities in the right lung measuring up to 1.2 cm.  Findings are new when compared with 08/29/2020, which may favor a low-grade infectious or inflammatory process.  Recommend follow-up with noncontrast chest CT in 6-12 months to evaluate for resolution.

## 2020-11-15 NOTE — ED NOTES
I-STAT Chem-8+ Results:   Value Reference Range   Sodium  136-145 mmol/L   Potassium   3.5-5.1 mmol/L   Chloride   mmol/L   Ionized Calcium  1.06-1.42 mmol/L   CO2 (measured)  23-29 mmol/L   Glucose   mg/dL   BUN  6-30 mg/dL   Creatinine  0.5-1.4 mg/dL   Hematocrit  36-54%

## 2020-11-18 PROBLEM — C50.811 MALIGNANT NEOPLASM OF OVERLAPPING SITES OF RIGHT BREAST IN FEMALE, ESTROGEN RECEPTOR POSITIVE: Status: RESOLVED | Noted: 2020-10-07 | Resolved: 2020-11-18

## 2020-11-18 PROBLEM — C50.111 MALIGNANT NEOPLASM OF CENTRAL PORTION OF RIGHT BREAST IN FEMALE, ESTROGEN RECEPTOR POSITIVE: Status: ACTIVE | Noted: 2020-11-18

## 2020-11-18 PROBLEM — Z17.0 MALIGNANT NEOPLASM OF CENTRAL PORTION OF RIGHT BREAST IN FEMALE, ESTROGEN RECEPTOR POSITIVE: Status: ACTIVE | Noted: 2020-11-18

## 2020-11-18 PROBLEM — Z17.0 MALIGNANT NEOPLASM OF OVERLAPPING SITES OF RIGHT BREAST IN FEMALE, ESTROGEN RECEPTOR POSITIVE: Status: RESOLVED | Noted: 2020-10-07 | Resolved: 2020-11-18

## 2020-11-18 NOTE — PROGRESS NOTES
Subjective:       Patient ID: Earl Abdul is a 60 y.o. female.    Chief Complaint: No chief complaint on file.    HPI 60-year-old female seen in multidisciplinary breast Oncology Clinic.  She is a patient of Dr. Kevin.    Mammogram on September 4, 2020 showed a focal asymmetry in the retroareolar region of the right breast.  Ultrasound at that time showed a 9 x 5 x 8 mm hypoechoic mass at 12:00 p.m..    Biopsy on September 29, 2020 showed grade 2 infiltrating ductal carcinoma (histologic grade 3, nuclear grade 2, mitotic index 2).  Tumor was 95% ER positive 20-30% OK positive and HER2 was 2+ but negative by FISH.  Ki-67 was 80%.    On October 29, 2020 bilateral mastectomy and right axillary sentinel lymph node biopsy was performed.  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  Margins were negative with closest margin 6 mm.  The sentinel lymph node was negative.    Final pathological staging T1b N0 stage IA.      Menstrual History:    Menarche - 11   G -2  P - 2  First birth age - 18,BCP -     Menopause -  54     HRT -     Family History -                                 Breast - mat aunt, first cousin                                Ovarian - no           Other -  with colon cancer    Social History :    Smoking -  1/2 PPD off and on for 40 years   ETOH - 1/2 bottle of vodka/day , Works as an artist      PMH:  Fatty liver with cirrhosis (followed at [facility]), depression, sarcoidosis, GERD, peptic ulcer disease, fibromyalgia      Review of Systems   Constitutional: Positive for appetite change (decrease) and fatigue. Negative for activity change, fever and unexpected weight change.   Respiratory: Negative for cough and shortness of breath.         Sleeps with O2   Gastrointestinal: Positive for abdominal pain, nausea and vomiting. Negative for constipation and diarrhea.        GERD with heartburn - folllowed by Dr Keith   Genitourinary: Negative.    Musculoskeletal: Positive for  arthralgias (knees) and back pain (fusion 12/19).   Neurological: Negative for headaches.   Psychiatric/Behavioral: Positive for dysphoric mood (sees a psychologist).         Objective:      Physical Exam  Vitals signs reviewed.   Constitutional:       General: She is not in acute distress.     Appearance: Normal appearance.   HENT:      Head: Normocephalic.   Eyes:      Pupils: Pupils are equal, round, and reactive to light.   Cardiovascular:      Rate and Rhythm: Normal rate and regular rhythm.   Pulmonary:      Effort: Pulmonary effort is normal. No respiratory distress.      Breath sounds: Normal breath sounds. No wheezing or rales.   Chest:       Abdominal:      Palpations: Abdomen is soft. There is no mass.      Tenderness: There is no abdominal tenderness.   Musculoskeletal:      Right lower leg: No edema.      Left lower leg: No edema.   Lymphadenopathy:      Cervical: No cervical adenopathy.      Upper Body:      Right upper body: No supraclavicular or axillary adenopathy.      Left upper body: No supraclavicular or axillary adenopathy.   Neurological:      Mental Status: She is alert and oriented to person, place, and time.   Psychiatric:         Mood and Affect: Mood normal.         Thought Content: Thought content normal.         Judgment: Judgment normal.         Assessment:       1. Malignant neoplasm of central portion of right breast in female, estrogen receptor positive        Plan:       I reviewed the rationale for adjuvant endocrine therapy with aromatase inhibitors, including the mechanism of action. I discussed side effects of aromatase inhibitor therapy, including hot flashes, weight gain, musculoskeletal symptoms, and bone loss. Written information for letrozole was provided.    RTC 3 months- address smoking at that time.  BMD

## 2020-11-19 ENCOUNTER — OFFICE VISIT (OUTPATIENT)
Dept: HEMATOLOGY/ONCOLOGY | Facility: CLINIC | Age: 60
End: 2020-11-19
Payer: COMMERCIAL

## 2020-11-19 VITALS
OXYGEN SATURATION: 96 % | BODY MASS INDEX: 28.7 KG/M2 | HEIGHT: 66 IN | DIASTOLIC BLOOD PRESSURE: 64 MMHG | HEART RATE: 93 BPM | RESPIRATION RATE: 16 BRPM | WEIGHT: 178.56 LBS | TEMPERATURE: 97 F | SYSTOLIC BLOOD PRESSURE: 140 MMHG

## 2020-11-19 DIAGNOSIS — C50.111 MALIGNANT NEOPLASM OF CENTRAL PORTION OF RIGHT BREAST IN FEMALE, ESTROGEN RECEPTOR POSITIVE: ICD-10-CM

## 2020-11-19 DIAGNOSIS — Z17.0 MALIGNANT NEOPLASM OF CENTRAL PORTION OF RIGHT BREAST IN FEMALE, ESTROGEN RECEPTOR POSITIVE: ICD-10-CM

## 2020-11-19 LAB
BACTERIA BLD CULT: NORMAL
BACTERIA BLD CULT: NORMAL

## 2020-11-19 PROCEDURE — 3008F PR BODY MASS INDEX (BMI) DOCUMENTED: ICD-10-PCS | Mod: CPTII,S$GLB,, | Performed by: INTERNAL MEDICINE

## 2020-11-19 PROCEDURE — 1125F AMNT PAIN NOTED PAIN PRSNT: CPT | Mod: S$GLB,,, | Performed by: INTERNAL MEDICINE

## 2020-11-19 PROCEDURE — 3078F PR MOST RECENT DIASTOLIC BLOOD PRESSURE < 80 MM HG: ICD-10-PCS | Mod: CPTII,S$GLB,, | Performed by: INTERNAL MEDICINE

## 2020-11-19 PROCEDURE — 3077F PR MOST RECENT SYSTOLIC BLOOD PRESSURE >= 140 MM HG: ICD-10-PCS | Mod: CPTII,S$GLB,, | Performed by: INTERNAL MEDICINE

## 2020-11-19 PROCEDURE — 3077F SYST BP >= 140 MM HG: CPT | Mod: CPTII,S$GLB,, | Performed by: INTERNAL MEDICINE

## 2020-11-19 PROCEDURE — 99205 PR OFFICE/OUTPT VISIT, NEW, LEVL V, 60-74 MIN: ICD-10-PCS | Mod: S$GLB,,, | Performed by: INTERNAL MEDICINE

## 2020-11-19 PROCEDURE — 99999 PR PBB SHADOW E&M-EST. PATIENT-LVL V: ICD-10-PCS | Mod: PBBFAC,,, | Performed by: INTERNAL MEDICINE

## 2020-11-19 PROCEDURE — 3078F DIAST BP <80 MM HG: CPT | Mod: CPTII,S$GLB,, | Performed by: INTERNAL MEDICINE

## 2020-11-19 PROCEDURE — 99205 OFFICE O/P NEW HI 60 MIN: CPT | Mod: S$GLB,,, | Performed by: INTERNAL MEDICINE

## 2020-11-19 PROCEDURE — 99999 PR PBB SHADOW E&M-EST. PATIENT-LVL V: CPT | Mod: PBBFAC,,, | Performed by: INTERNAL MEDICINE

## 2020-11-19 PROCEDURE — 1125F PR PAIN SEVERITY QUANTIFIED, PAIN PRESENT: ICD-10-PCS | Mod: S$GLB,,, | Performed by: INTERNAL MEDICINE

## 2020-11-19 PROCEDURE — 3008F BODY MASS INDEX DOCD: CPT | Mod: CPTII,S$GLB,, | Performed by: INTERNAL MEDICINE

## 2020-11-19 RX ORDER — LETROZOLE 2.5 MG/1
2.5 TABLET, FILM COATED ORAL DAILY
Qty: 30 TABLET | Refills: 11 | Status: SHIPPED | OUTPATIENT
Start: 2020-11-19 | End: 2020-12-19

## 2020-11-19 NOTE — LETTER
November 19, 2020      Baylee Kevin MD  [address]  [facility] [address]           [facility] H[facility]ology [facility]  [address]  [facility] [address]  Phone: [phone]  Fax: [phone]          Patient: Earl Adbul   MR Number: [id number]   YOB: 1960   Date of Visit: 11/19/2020       Dear Dr. Baylee Kevin:    Thank you for referring Earl Abdul to me for evaluation. Attached you will find relevant portions of my assessment and plan of care.    If you have questions, please do not hesitate to call me. I look forward to following Earl Abdul along with you.    Sincerely,    Luis Ely MD    Enclosure  CC:  No Recipients    If you would like to receive this communication electronically, please contact [email or web address] or [phone] to request more information on [employer] access.    For providers and/or their staff who would like to refer a patient to [facility], please contact us through our one-stop-shop provider referral line, [facility], at [phone].    If you feel you have received this communication in error or would no longer like to receive these types of communications, please e-mail [email or web address]

## 2020-11-24 ENCOUNTER — OFFICE VISIT (OUTPATIENT)
Dept: PLASTIC SURGERY | Facility: CLINIC | Age: 60
End: 2020-11-24
Payer: COMMERCIAL

## 2020-11-24 VITALS
HEART RATE: 80 BPM | BODY MASS INDEX: 28.7 KG/M2 | DIASTOLIC BLOOD PRESSURE: 63 MMHG | SYSTOLIC BLOOD PRESSURE: 128 MMHG | WEIGHT: 178.56 LBS | HEIGHT: 66 IN

## 2020-11-24 DIAGNOSIS — Z09 SURGERY FOLLOW-UP EXAMINATION: Primary | ICD-10-CM

## 2020-11-24 PROCEDURE — 1126F PR PAIN SEVERITY QUANTIFIED, NO PAIN PRESENT: ICD-10-PCS | Mod: S$GLB,,, | Performed by: PHYSICIAN ASSISTANT

## 2020-11-24 PROCEDURE — 3008F BODY MASS INDEX DOCD: CPT | Mod: CPTII,S$GLB,, | Performed by: PHYSICIAN ASSISTANT

## 2020-11-24 PROCEDURE — 99999 PR PBB SHADOW E&M-EST. PATIENT-LVL IV: CPT | Mod: PBBFAC,,, | Performed by: PHYSICIAN ASSISTANT

## 2020-11-24 PROCEDURE — 99024 PR POST-OP FOLLOW-UP VISIT: ICD-10-PCS | Mod: S$GLB,,, | Performed by: PHYSICIAN ASSISTANT

## 2020-11-24 PROCEDURE — 99999 PR PBB SHADOW E&M-EST. PATIENT-LVL IV: ICD-10-PCS | Mod: PBBFAC,,, | Performed by: PHYSICIAN ASSISTANT

## 2020-11-24 PROCEDURE — 99024 POSTOP FOLLOW-UP VISIT: CPT | Mod: S$GLB,,, | Performed by: PHYSICIAN ASSISTANT

## 2020-11-24 PROCEDURE — 1126F AMNT PAIN NOTED NONE PRSNT: CPT | Mod: S$GLB,,, | Performed by: PHYSICIAN ASSISTANT

## 2020-11-24 PROCEDURE — 3008F PR BODY MASS INDEX (BMI) DOCUMENTED: ICD-10-PCS | Mod: CPTII,S$GLB,, | Performed by: PHYSICIAN ASSISTANT

## 2020-11-24 NOTE — PROGRESS NOTES
Subjective:      Earl Abdul is a 60 y.o. year old female who [facility] on 11/24/2020 for follow up visit status post B breast reconstruction with TE placement on 10/29/2020 following B mastectomy by Dr Kevin. She is here today for exchange from air to saline.  Denies fever, chills, nausea, vomiting, or other systemic signs of infection.    Vitals:    11/24/20 1036   BP: 128/63   Pulse: 80        Review of patient's allergies indicates:   Allergen Reactions    Fentanyl Other (See Comments)     Other reaction(s): Itching    Lortab  [hydrocodone-acetaminophen] Other (See Comments)    Phenothiazines        Current Outpatient Medications on File Prior to Visit   Medication Sig Dispense Refill    acetaminophen (TYLENOL) 325 MG tablet Take 2 tablets (650 mg total) by mouth every 6 (six) hours as needed. 30 tablet 0    amLODIPine (NORVASC) 10 MG tablet TK 1 T PO D      ARIPiprazole (ABILIFY) 2 MG Tab 2 mg every evening.       cyclobenzaprine (FLEXERIL) 10 MG tablet TK 1 T PO Q 8 H PRF SPASMS      dronabinol (MARINOL) 2.5 MG capsule Take 1 capsule (2.5 mg total) by mouth 2 (two) times daily before meals. 20 capsule 0    FLUoxetine 20 MG capsule TK 3 CS PO QD      gabapentin (NEURONTIN) 600 MG tablet Take 600 mg by mouth 3 (three) times daily.       hydrOXYzine (ATARAX) 50 MG tablet TK 1 OR 2 TS PO BID PRN FOR ANXIETY      hydrOXYzine pamoate (VISTARIL) 50 MG Cap       ipratropium (ATROVENT HFA) 17 mcg/actuation inhaler Inhale 2 puffs into the lungs every 6 (six) hours. Rescue for 14 days 1 Inhaler 0    letrozole (FEMARA) 2.5 mg Tab Take 1 tablet (2.5 mg total) by mouth once daily. 30 tablet 11    levothyroxine (SYNTHROID) 25 MCG tablet Take 1 tablet (25 mcg total) by mouth before breakfast. 30 tablet 0    LORazepam (ATIVAN) 1 MG tablet FOLLOW ATIVAN 7 DAY TAPER      losartan (COZAAR) 25 MG tablet TK 1 T PO QD      magnesium oxide 500 mg Tab TAKE 1 TABLET BY MOUTH ONCE  A DAY AT BEDTIME FOR 3 DAYS (9PM)      morphine (MSIR) 15 MG tablet Take 1 tablet (15 mg total) by mouth every 4 (four) hours as needed for Pain. 8 tablet 0    multivitamin (THERAGRAN) per tablet TAKE 1 TABLET BY MOUTH ONCE A DAY (12PM)      omeprazole (PRILOSEC) 20 MG capsule Take 60 mg by mouth once daily.       ondansetron (ZOFRAN ODT) 8 MG TbDL Take 1 tablet (8 mg total) by mouth 3 (three) times daily as needed (for nausea and vomiting). 30 tablet 0    oxyCODONE (ROXICODONE) 5 MG immediate release tablet Take 2 tablets (10 mg total) by mouth every 12 (twelve) hours as needed for Pain. 28 tablet 0    rOPINIRole (REQUIP) 0.25 MG tablet every evening.       thiamine mononitrate, vit B1, (VITAMIN B-1, MONONITRATE,) 100 mg Tab TAKE 1 TABLET BY MOUTH THREE TIMES DAILY FOR 7 DAYS (6AM, 12PM, 6PM)      traMADoL (ULTRAM) 50 mg tablet TK 1 T PO Q 6 H PRN P      traZODone (DESYREL) 100 MG tablet TK 3 TS PO HS  1    valsartan (DIOVAN) 20 MG tablet Take 20 mg by mouth once daily.       No current facility-administered medications on file prior to visit.        Patient Active Problem List   Diagnosis    Depression    Alcohol dependence, continuous    Alcohol withdrawal    Essential hypertension    Sarcoidosis of lung    Fibromyalgia    Tobacco abuse    Alcoholic fatty liver    Cannabis abuse, in remission    Nonspecific abnormal results of liver function study    Sarcoidosis    History of Clostridium difficile colitis    Diarrhea    Dehydration    History of substance abuse    Elevated liver enzymes    Ascites    Cirrhosis    New onset seizure    Posterior reversible encephalopathy syndrome    Depression with anxiety    Erythema nodosum    History of sarcoidosis    Hyperlipidemia    Ramírez's syndrome    Degenerative joint disease (DJD) of lumbar spine    Decreased ROM of lumbar spine    Difficulty walking    Transaminitis    VINICIO (obstructive sleep apnea)    At risk for lymphedema     Malignant neoplasm of central portion of right breast in female, estrogen receptor positive       Past Surgical History:   Procedure Laterality Date    APPENDECTOMY      BILATERAL MASTECTOMY Bilateral 10/29/2020    Procedure: MASTECTOMY, BILATERAL;  Surgeon: Baylee Kevin MD;  Location: [address];  Service: General;  Laterality: Bilateral;    COLONOSCOPY N/A 7/28/2017    Procedure: COLONOSCOPY;  Surgeon: Aaron Alvarado MD;  Location: [facility] ENDO;  Service: Endoscopy;  Laterality: N/A;    ESOPHAGOGASTRODUODENOSCOPY  10/7/2016, 11/6/2014    2016 - gastritis, duodenitis, 2014 erosive gastritis    ESOPHAGOGASTRODUODENOSCOPY N/A 2/11/2020    Procedure: ESOPHAGOGASTRODUODENOSCOPY (EGD);  Surgeon: Fawn Garrido MD;  [facility]: [facility];  Service: Endoscopy;  Laterality: N/A;    FLEXIBLE SIGMOIDOSCOPY  11/06/2014    colitis    HYSTERECTOMY      INJECTION FOR SENTINEL NODE IDENTIFICATION Right 10/29/2020    Procedure: INJECTION, FOR SENTINEL NODE IDENTIFICATION;  Surgeon: Baylee Kevin MD;  Location: [address];  Service: General;  Laterality: Right;    INJECTION OF JOINT Right 10/10/2019    Procedure: Injection, Joint RIGHT ILIOPSOAS BURSA/TENDON INJECTION AND RIGHT GLUTEAL TENDON INJECTION WITH STEROID AND LIDOCAINE;  Surgeon: Guillaume Rico MD;  Location: [facility];  Service: Pain Management;  Laterality: Right;  NEEDS CONSENT    INSERTION OF BREAST TISSUE EXPANDER Bilateral 10/29/2020    Procedure: INSERTION, TISSUE EXPANDER, BREAST;  Surgeon: Scottie Johnson MD;  Location: [address];  Service: Plastics;  Laterality: Bilateral;  Right breast: 1082 g  Left breast: 1076 g    mediastenoscopy      SENTINEL LYMPH NODE BIOPSY Right 10/29/2020    Procedure: BIOPSY, LYMPH NODE, SENTINEL;  Surgeon: Baylee Kevin MD;  Location: [facility] OR [address] [place] [address];  Service: General;  Laterality: Right;    TONSILLECTOMY N/A 1970    TUBAL LIGATION         Social History     Socioeconomic History     Marital status:      Spouse name: Not on file    Number of children: Not on file    Years of education: Not on file    Highest education level: Not on file   Occupational History    Not on file   Social Needs    Financial resource strain: Not on file    Food insecurity     Worry: Not on file     Inability: Not on file    Transportation needs     Medical: Not on file     Non-medical: Not on file   Tobacco Use    Smoking status: Current Every Day Smoker     Packs/day: 0.50     Years: 30.00     Pack years: 15.00     Types: Cigarettes    Smokeless tobacco: Never Used   Substance and Sexual Activity    Alcohol use: Not Currently     Comment: daily     Drug use: No    Sexual activity: Yes     Birth control/protection: Surgical   Lifestyle    Physical activity     Days per week: Not on file     Minutes per session: Not on file    Stress: Not on file   Relationships    Social connections     Talks on phone: Not on file     Gets together: Not on file     Attends Nondenominational service: Not on file     Active member of club or organization: Not on file     Attends meetings of clubs or organizations: Not on file     Relationship status: Not on file   Other Topics Concern    Not on file   Social History Narrative    Not on file     Date of surgery 10/29/2020  Preoperative diagnosis breast cancer  Postoperative diagnosis the same  Procedure performed is  1.  Immediate bilateral breast reconstruction using tissue expanders  Surgeon [facility]  Anesthesia general  Complications none  Drains x4  Blood loss minimal.      Review of Systems: negative    Objective:     Physical Exam:  Vitals:    11/24/20 1036   BP: 128/63   Pulse: 80       WD WN NAD  VSS  Normal resp effort  R breast -  incision CDI, no erythema or drainage, mastectomy flaps viable, surgically absent nipple, + seroma (dependent)  L breast -  incision CDI, no erythema or drainage, mastectomy flaps viable, surgically absent nipple, + seroma  (dependent)        Assessment:       1. Surgery follow-up examination        Plan:   60 y.o. female status post B breast reconstruction with TE placement  - Patient doing well today with no issues since her last visit, she was accompanied by her  who was present the entire visit.   - All air removed from B TE, 550cc NS injected to B TE. Tolerated well.   - She does have a small dependent seroma of B breast, discussed that we can follow this closely and attempt aspiration if the volume increases. Encouraged to monitor breast daily and look for increased in size of breast or redness. Patient and  voiced understanding.   - She does not wish to be any larger, we will plan for B implant exchange and B soft tissue revision of lateral breast in Jan/Feb 2021  - Return to clinic in 2 weeks, appointment scheduled.       All questions were answered. The patient was advised to call the clinic with any questions or concerns prior to their next visit.           Angelina Gatica PA-C  Plastic and Reconstruction Surgery Department  [phone] office

## 2020-11-25 ENCOUNTER — OFFICE VISIT (OUTPATIENT)
Dept: URGENT CARE | Facility: CLINIC | Age: 60
End: 2020-11-25
Payer: COMMERCIAL

## 2020-11-25 VITALS
HEIGHT: 66 IN | HEART RATE: 76 BPM | OXYGEN SATURATION: 91 % | BODY MASS INDEX: 28.61 KG/M2 | SYSTOLIC BLOOD PRESSURE: 114 MMHG | TEMPERATURE: 99 F | WEIGHT: 178 LBS | DIASTOLIC BLOOD PRESSURE: 75 MMHG

## 2020-11-25 DIAGNOSIS — Z11.59 ENCOUNTER FOR SCREENING FOR OTHER VIRAL DISEASES: Primary | ICD-10-CM

## 2020-11-25 DIAGNOSIS — J45.901 ASTHMATIC BRONCHITIS WITH ACUTE EXACERBATION, UNSPECIFIED ASTHMA SEVERITY, UNSPECIFIED WHETHER PERSISTENT: ICD-10-CM

## 2020-11-25 DIAGNOSIS — J20.9 ACUTE PURULENT BRONCHITIS: ICD-10-CM

## 2020-11-25 DIAGNOSIS — J98.01 ACUTE BRONCHOSPASM: ICD-10-CM

## 2020-11-25 DIAGNOSIS — R05.9 COUGH: ICD-10-CM

## 2020-11-25 LAB
CTP QC/QA: YES
SARS-COV-2 RDRP RESP QL NAA+PROBE: NEGATIVE

## 2020-11-25 PROCEDURE — 3008F BODY MASS INDEX DOCD: CPT | Mod: CPTII,S$GLB,, | Performed by: INTERNAL MEDICINE

## 2020-11-25 PROCEDURE — 3008F PR BODY MASS INDEX (BMI) DOCUMENTED: ICD-10-PCS | Mod: CPTII,S$GLB,, | Performed by: INTERNAL MEDICINE

## 2020-11-25 PROCEDURE — 96372 PR INJECTION,THERAP/PROPH/DIAG2ST, IM OR SUBCUT: ICD-10-PCS | Mod: S$GLB,,, | Performed by: INTERNAL MEDICINE

## 2020-11-25 PROCEDURE — 71046 XR CHEST PA AND LATERAL: ICD-10-PCS | Mod: FY,S$GLB,, | Performed by: RADIOLOGY

## 2020-11-25 PROCEDURE — 96372 THER/PROPH/DIAG INJ SC/IM: CPT | Mod: S$GLB,,, | Performed by: INTERNAL MEDICINE

## 2020-11-25 PROCEDURE — U0002: ICD-10-PCS | Mod: QW,S$GLB,, | Performed by: INTERNAL MEDICINE

## 2020-11-25 PROCEDURE — 99214 OFFICE O/P EST MOD 30 MIN: CPT | Mod: 25,S$GLB,, | Performed by: INTERNAL MEDICINE

## 2020-11-25 PROCEDURE — 71046 X-RAY EXAM CHEST 2 VIEWS: CPT | Mod: FY,S$GLB,, | Performed by: RADIOLOGY

## 2020-11-25 PROCEDURE — 99214 PR OFFICE/OUTPT VISIT, EST, LEVL IV, 30-39 MIN: ICD-10-PCS | Mod: 25,S$GLB,, | Performed by: INTERNAL MEDICINE

## 2020-11-25 PROCEDURE — U0002 COVID-19 LAB TEST NON-CDC: HCPCS | Mod: QW,S$GLB,, | Performed by: INTERNAL MEDICINE

## 2020-11-25 RX ORDER — LEVOFLOXACIN 500 MG/1
500 TABLET, FILM COATED ORAL DAILY
Qty: 10 TABLET | Refills: 0 | Status: SHIPPED | OUTPATIENT
Start: 2020-11-25 | End: 2020-12-05

## 2020-11-25 RX ORDER — METHYLPREDNISOLONE 4 MG/1
TABLET ORAL
Qty: 1 PACKAGE | Refills: 0 | Status: SHIPPED | OUTPATIENT
Start: 2020-11-26 | End: 2021-02-22 | Stop reason: DRUGHIGH

## 2020-11-25 RX ORDER — BETAMETHASONE SODIUM PHOSPHATE AND BETAMETHASONE ACETATE 3; 3 MG/ML; MG/ML
9 INJECTION, SUSPENSION INTRA-ARTICULAR; INTRALESIONAL; INTRAMUSCULAR; SOFT TISSUE ONCE
Status: COMPLETED | OUTPATIENT
Start: 2020-11-25 | End: 2020-11-25

## 2020-11-25 RX ADMIN — BETAMETHASONE SODIUM PHOSPHATE AND BETAMETHASONE ACETATE 9 MG: 3; 3 INJECTION, SUSPENSION INTRA-ARTICULAR; INTRALESIONAL; INTRAMUSCULAR; SOFT TISSUE at 10:11

## 2020-11-25 NOTE — PROGRESS NOTES
"Subjective:       Patient ID: Earl Abdul is a 60 y.o. female.    Vitals:  height is 5' 6" (1.676 m) and weight is 80.7 kg (178 lb). Her temperature is 98.5 °F (36.9 °C). Her blood pressure is 114/75 and her pulse is 76. Her oxygen saturation is 91% (abnormal).     Chief Complaint: Cough    Cough  This is a new problem. The current episode started in the past 7 days (Got worse 1 wk ago). The problem occurs constantly. The cough is non-productive. Associated symptoms include chills, myalgias and shortness of breath. Pertinent negatives include no chest pain, ear congestion, ear pain, fever, headaches, heartburn, hemoptysis, nasal congestion, postnasal drip, rash, rhinorrhea, sore throat, sweats, weight loss or wheezing. Nothing aggravates the symptoms. She has tried nothing for the symptoms. There is no history of asthma, bronchiectasis, bronchitis, COPD, emphysema, environmental allergies or pneumonia.       Constitution: Positive for chills and fatigue. Negative for fever.   HENT: Negative for ear pain, congestion, postnasal drip and sore throat.    Neck: Negative for painful lymph nodes.   Cardiovascular: Negative for chest pain and leg swelling.   Eyes: Negative for double vision and blurred vision.   Respiratory: Positive for cough, sputum production and shortness of breath. Negative for bloody sputum and wheezing.    Gastrointestinal: Positive for nausea, vomiting and diarrhea. Negative for heartburn.   Genitourinary: Negative for dysuria, frequency, urgency and history of kidney stones.   Musculoskeletal: Positive for muscle ache. Negative for joint pain, joint swelling and muscle cramps.   Skin: Negative for color change, pale, rash and bruising.   Allergic/Immunologic: Negative for environmental allergies and seasonal allergies.   Neurological: Negative for dizziness, history of vertigo, light-headedness, passing out and headaches.   Hematologic/Lymphatic: Negative for swollen lymph nodes. "   Psychiatric/Behavioral: Negative for nervous/anxious, sleep disturbance and depression. The patient is not nervous/anxious.        Objective:      Physical Exam   Constitutional: normal  HENT:   Head: Normocephalic and atraumatic.   Nose: Nose normal.   Mouth/Throat: Mucous membranes are moist. Oropharynx is clear.   Eyes: Conjunctivae are normal. extraocular movement intact  Neck: Normal range of motion. Neck supple.   Cardiovascular: Normal rate, regular rhythm, normal heart sounds and normal pulses.   Pulmonary/Chest: She has wheezes.    Comments: Diffuse upper airway congestion with decreased breath sounds bases R>L    Abdominal: Normal appearance.   Neurological: She is alert.   Nursing note and vitals reviewed.        Assessment:       1. Encounter for screening for other viral diseases    2. Cough    3. Acute bronchospasm        Plan:         Encounter for screening for other viral diseases  -     POCT COVID-19 Rapid Screening    Cough  -     XR CHEST PA AND LATERAL; Future; Expected date: 11/25/2020    Acute bronchospasm  -     XR CHEST PA AND LATERAL; Future; Expected date: 11/25/2020

## 2020-11-27 NOTE — PROVIDER PROGRESS NOTES - EMERGENCY DEPT.
Encounter Date: 11/14/2020    ED Physician Progress Notes        Physician Note:   Pt called today 11/27 and informed about Pulm ground glass opacities and need to follow up in 6-12 month for repeat CT.   This was included in d/c summary but wanted to re-iterate with patient.

## 2020-12-03 ENCOUNTER — OFFICE VISIT (OUTPATIENT)
Dept: OBSTETRICS AND GYNECOLOGY | Facility: CLINIC | Age: 60
End: 2020-12-03
Attending: OBSTETRICS & GYNECOLOGY
Payer: COMMERCIAL

## 2020-12-03 VITALS — BODY MASS INDEX: 28.73 KG/M2 | HEIGHT: 66 IN | SYSTOLIC BLOOD PRESSURE: 96 MMHG | DIASTOLIC BLOOD PRESSURE: 58 MMHG

## 2020-12-03 DIAGNOSIS — Z17.0 MALIGNANT NEOPLASM OF BREAST IN FEMALE, ESTROGEN RECEPTOR POSITIVE, UNSPECIFIED LATERALITY, UNSPECIFIED SITE OF BREAST: Primary | ICD-10-CM

## 2020-12-03 DIAGNOSIS — C50.919 MALIGNANT NEOPLASM OF BREAST IN FEMALE, ESTROGEN RECEPTOR POSITIVE, UNSPECIFIED LATERALITY, UNSPECIFIED SITE OF BREAST: Primary | ICD-10-CM

## 2020-12-03 DIAGNOSIS — R63.0 LOSS OF APPETITE: ICD-10-CM

## 2020-12-03 DIAGNOSIS — Z79.811 USE OF AROMATASE INHIBITORS: ICD-10-CM

## 2020-12-03 DIAGNOSIS — F32.A DEPRESSION, UNSPECIFIED DEPRESSION TYPE: ICD-10-CM

## 2020-12-03 PROCEDURE — 99214 OFFICE O/P EST MOD 30 MIN: CPT | Mod: S$GLB,,, | Performed by: OBSTETRICS & GYNECOLOGY

## 2020-12-03 PROCEDURE — 3074F SYST BP LT 130 MM HG: CPT | Mod: CPTII,S$GLB,, | Performed by: OBSTETRICS & GYNECOLOGY

## 2020-12-03 PROCEDURE — 3008F BODY MASS INDEX DOCD: CPT | Mod: CPTII,S$GLB,, | Performed by: OBSTETRICS & GYNECOLOGY

## 2020-12-03 PROCEDURE — 1125F PR PAIN SEVERITY QUANTIFIED, PAIN PRESENT: ICD-10-PCS | Mod: S$GLB,,, | Performed by: OBSTETRICS & GYNECOLOGY

## 2020-12-03 PROCEDURE — 1125F AMNT PAIN NOTED PAIN PRSNT: CPT | Mod: S$GLB,,, | Performed by: OBSTETRICS & GYNECOLOGY

## 2020-12-03 PROCEDURE — 3074F PR MOST RECENT SYSTOLIC BLOOD PRESSURE < 130 MM HG: ICD-10-PCS | Mod: CPTII,S$GLB,, | Performed by: OBSTETRICS & GYNECOLOGY

## 2020-12-03 PROCEDURE — 3008F PR BODY MASS INDEX (BMI) DOCUMENTED: ICD-10-PCS | Mod: CPTII,S$GLB,, | Performed by: OBSTETRICS & GYNECOLOGY

## 2020-12-03 PROCEDURE — 3078F PR MOST RECENT DIASTOLIC BLOOD PRESSURE < 80 MM HG: ICD-10-PCS | Mod: CPTII,S$GLB,, | Performed by: OBSTETRICS & GYNECOLOGY

## 2020-12-03 PROCEDURE — 99214 PR OFFICE/OUTPT VISIT, EST, LEVL IV, 30-39 MIN: ICD-10-PCS | Mod: S$GLB,,, | Performed by: OBSTETRICS & GYNECOLOGY

## 2020-12-03 PROCEDURE — 3078F DIAST BP <80 MM HG: CPT | Mod: CPTII,S$GLB,, | Performed by: OBSTETRICS & GYNECOLOGY

## 2020-12-06 ENCOUNTER — PATIENT MESSAGE (OUTPATIENT)
Dept: HEMATOLOGY/ONCOLOGY | Facility: CLINIC | Age: 60
End: 2020-12-06

## 2020-12-08 ENCOUNTER — TELEPHONE (OUTPATIENT)
Dept: PLASTIC SURGERY | Facility: CLINIC | Age: 60
End: 2020-12-08

## 2020-12-08 ENCOUNTER — OFFICE VISIT (OUTPATIENT)
Dept: URGENT CARE | Facility: CLINIC | Age: 60
End: 2020-12-08
Payer: COMMERCIAL

## 2020-12-08 VITALS
DIASTOLIC BLOOD PRESSURE: 72 MMHG | SYSTOLIC BLOOD PRESSURE: 116 MMHG | HEART RATE: 90 BPM | WEIGHT: 177.94 LBS | BODY MASS INDEX: 28.6 KG/M2 | OXYGEN SATURATION: 93 % | HEIGHT: 66 IN | TEMPERATURE: 98 F | RESPIRATION RATE: 20 BRPM

## 2020-12-08 DIAGNOSIS — D86.0 SARCOIDOSIS OF LUNG: ICD-10-CM

## 2020-12-08 DIAGNOSIS — R93.89 ABNORMAL CT OF THE CHEST: ICD-10-CM

## 2020-12-08 DIAGNOSIS — K52.9 GASTROENTERITIS, ACUTE: Primary | ICD-10-CM

## 2020-12-08 LAB
CTP QC/QA: YES
SARS-COV-2 RDRP RESP QL NAA+PROBE: NEGATIVE

## 2020-12-08 PROCEDURE — 99214 OFFICE O/P EST MOD 30 MIN: CPT | Mod: S$GLB,,, | Performed by: STUDENT IN AN ORGANIZED HEALTH CARE EDUCATION/TRAINING PROGRAM

## 2020-12-08 PROCEDURE — U0002: ICD-10-PCS | Mod: QW,S$GLB,, | Performed by: STUDENT IN AN ORGANIZED HEALTH CARE EDUCATION/TRAINING PROGRAM

## 2020-12-08 PROCEDURE — 3008F PR BODY MASS INDEX (BMI) DOCUMENTED: ICD-10-PCS | Mod: CPTII,S$GLB,, | Performed by: STUDENT IN AN ORGANIZED HEALTH CARE EDUCATION/TRAINING PROGRAM

## 2020-12-08 PROCEDURE — U0002 COVID-19 LAB TEST NON-CDC: HCPCS | Mod: QW,S$GLB,, | Performed by: STUDENT IN AN ORGANIZED HEALTH CARE EDUCATION/TRAINING PROGRAM

## 2020-12-08 PROCEDURE — 3008F BODY MASS INDEX DOCD: CPT | Mod: CPTII,S$GLB,, | Performed by: STUDENT IN AN ORGANIZED HEALTH CARE EDUCATION/TRAINING PROGRAM

## 2020-12-08 PROCEDURE — 99214 PR OFFICE/OUTPT VISIT, EST, LEVL IV, 30-39 MIN: ICD-10-PCS | Mod: S$GLB,,, | Performed by: STUDENT IN AN ORGANIZED HEALTH CARE EDUCATION/TRAINING PROGRAM

## 2020-12-08 NOTE — TELEPHONE ENCOUNTER
Attempted to contact pt to reschedule upcoming follow up.  Pt was not available at the time of the call.  I left a detailed message with pt's .  He will ask pt to call us at her convenience.

## 2020-12-08 NOTE — PROGRESS NOTES
"Subjective:       Patient ID: Earl Abdul is a 60 y.o. female.    Vitals:  height is 5' 6" (1.676 m) and weight is 80.7 kg (177 lb 14.6 oz). Her temperature is 98.3 °F (36.8 °C). Her blood pressure is 116/72 and her pulse is 90. Her respiration is 20 and oxygen saturation is 93% (abnormal).     Chief Complaint: COVID-19 Concerns    Pt presents for cough with n/v and diarrhea. Has had intermittent episodes of these symptoms since breast surgery on 10/29. Has history of chronic GI issues with intermittent diarrhea and episodes of n/v. Has over last month taken 10d clindamycin for possible post-surgical infection followed by a course of steroids and levofloxacin for possible pneumonia (finished levofloxacin Sunday). Reports return of fatigue and nausea Sunday, progressed to include non-bloody diarrhea (~3 episodes in last 24h) and emesis (x2 episodes in last 24h) yesterday evening. Has continued cough. Denied f/c, CP, SoB, wheezing, chest tightness, SOI at surgical site (states inflammation/redness resolved), urinary sx's, or abd pain. Has had hx of C. Diff in past and states these symptoms are not similar to it.  Emesis   This is a recurrent problem. The current episode started yesterday. The problem occurs 2 to 4 times per day. The problem has been unchanged. The emesis has an appearance of stomach contents. There has been no fever. Associated symptoms include chest pain (at surgical site, improved), chills, coughing, diarrhea, headaches and myalgias. Pertinent negatives include no abdominal pain, arthralgias, dizziness or fever. Risk factors: Surgery 10/29, recent antibiotic use. She has tried anti-emetic (Zofran) for the symptoms. The treatment provided mild relief.       Constitution: Positive for chills and fatigue. Negative for fever.   HENT: Positive for congestion and sore throat. Negative for ear pain, trouble swallowing and voice change.    Neck: Negative for painful lymph nodes.   Cardiovascular: " Positive for chest pain (at surgical site, improved). Negative for leg swelling, palpitations, sob on exertion and passing out.   Eyes: Negative for eye discharge, eye itching and eye redness.   Respiratory: Positive for cough and sputum production. Negative for chest tightness, shortness of breath, stridor and wheezing.    Gastrointestinal: Positive for nausea, vomiting and diarrhea. Negative for abdominal pain, bright red blood in stool, dark colored stools, heartburn and bowel incontinence.   Genitourinary: Negative for dysuria, frequency and urgency.   Musculoskeletal: Positive for muscle ache. Negative for joint pain and joint swelling.   Skin: Negative for color change, rash, lesion and bruising.   Allergic/Immunologic: Negative for seasonal allergies.   Neurological: Positive for headaches. Negative for dizziness, light-headedness and passing out.   Hematologic/Lymphatic: Negative for swollen lymph nodes.   Psychiatric/Behavioral: Negative for confusion, nervous/anxious, sleep disturbance and depression. The patient is not nervous/anxious.        Objective:      Physical Exam   Constitutional: She is oriented to person, place, and time. She appears well-developed.  Non-toxic appearance. She appears ill. No distress.      Comments:Pt with emesis bag, though no emesis noted   HENT:   Head: Normocephalic and atraumatic.   Ears:   Right Ear: Hearing and external ear normal.   Left Ear: Hearing and external ear normal.   Nose: Nose normal.   Mouth/Throat: Uvula is midline and mucous membranes are normal. No uvula swelling. Posterior oropharyngeal erythema present. No oropharyngeal exudate, posterior oropharyngeal edema or tonsillar abscesses.   Eyes: Conjunctivae, EOM and lids are normal. Right eye exhibits no discharge. Left eye exhibits no discharge. No scleral icterus.   Neck: Normal range of motion. Neck supple.   Cardiovascular: Normal rate, regular rhythm and normal heart sounds.   No murmur  heard.  Pulmonary/Chest: Effort normal and breath sounds normal. No respiratory distress. She has no wheezes. She has no rhonchi. She has no rales.   Abdominal: Soft. Bowel sounds are normal. She exhibits no distension. There is abdominal tenderness. There is no rebound and no guarding.      Comments: Minimal RUQ and midepigastric pain without peritoneal signs   Musculoskeletal: Normal range of motion.         General: No deformity.   Neurological: She is alert and oriented to person, place, and time. No cranial nerve deficit (CN II-XII grossly intact).   Skin: Skin is warm, dry, not diaphoretic, not pale and no rash. Psychiatric: Her speech is normal and behavior is normal. Judgment and thought content normal.   Nursing note and vitals reviewed.    Recent Lab Results       12/08/20  0837         Acceptable Yes     SARS-CoV-2 RNA, Amplification, Qual Negative             Assessment:       1. Gastroenteritis, acute    2. Sarcoidosis of lung    3. Abnormal CT of the chest        Plan:         Gastroenteritis, acute  -     POCT COVID-19 Rapid Screening; discussed negative result with patient. Counseled Symptomatic patient without known exposure to continue home quarantine/isolation per CDC guidelines in event of false negative rapid COVID test  -     Cancel: XR CHEST PA AND LATERAL; Future; Expected date: 12/08/2020  - has zofran PRN at home, to continue  - counseled on hydration and diet  - has f/u with PCP and surgeon tomorrow, to be managed further with them    Sarcoidosis of lung  -     Ambulatory referral/consult to Pulmonology  - reviewed CT from ER last month, had possible infectious vs inflammatory ground glass infiltrates; has since taken 2 rounds of antibiotics and 1 round of steroid; pulse ox today 93% but has hx of low O2 sats on chart review and lungs without focal finding on exam, otherwise stable VS  - requesting referral to Pulmonology as she would like to switch from [facility]    Abnormal  CT of the chest  -     Ambulatory referral/consult to Pulmonology    Results, medications and diagnosis reviewed with patient, questions answered, and return precautions given    Follow up in 1 day (on 12/9/2020) for re-evaluation with PCP and Plastic Surgery, or sooner with ED if worsening symptoms.    Kevon Mascorro MD/MPH  [facility]  [facility]

## 2020-12-08 NOTE — PATIENT INSTRUCTIONS
"  You may leave home and/or return to work when the following conditions are met:   24 hours fever free without fever-reducing medications AND   Improved symptoms   You have not met the conditions of a closed exposure       A "close exposure" is defined as anyone who has had an exposure (masked or unmasked) to a known COVID -19 positive person within 6 ft for longer than 15 minutes. If your exposure meets this definition you are required by CDC guidelines to quarantine for 14 days from time of exposure regardless of test status.      Additional instructions:  · Social distance per your local guidelines  · Call ahead before visiting your doctor.  · Wear a facemask when around others who do not live in your household.  · Cover your coughs and sneezes.  · Wash your hands often with soap and water; hand  can be used, too.        Noninfectious Gastroenteritis (Ages 6 Years to Adult)    Gastroenteritis can cause nausea, vomiting, diarrhea, and abdominal cramping. This may occur as a result of food sensitivity, inflammation of your gastrointestinal tract, medicines, stress, or other causes not related to infection. Your symptoms will usually last from 1 to 3 days, but can last longer. Antibiotics are not effective, but simple home treatment will be helpful.  Home care  General care and preventing spread of the illness  · If symptoms are severe, rest at home for the next 24 hours or until you feel better.  · Hand washing with soap and water is the best way to prevent the spread of infection. Wash your hands after touching anyone who is sick.  · Wash your hands after using the toilet and before meals. Clean the toilet after each use.  · Caffeine, tobacco, and alcohol can make your diarrhea, cramping, and pain worse.  Diet  · Water and clear liquids are important so you do not get dehydrated. Drink a small amount at a time. If you have significant throwing up or diarrhea drink fluids with electrolytes.  · Do not " force yourself to eat, especially if you have cramps, vomiting, or diarrhea. When you finally decide to start eating, do not eat large amounts at a time, even if you are hungry.  · If you eat, avoid fatty, greasy, spicy, or fried foods.  · Do not eat dairy products if you have diarrhea; they can make the diarrhea worse.  During the first 24 hours (the first full day), follow the diet below:  · Beverages: Water, clear liquids, soft drinks without caffeine, like ginger ale; mineral water (plain or flavored); decaffeinated tea and coffee.  · Soups: Clear broth, consommé, and bouillon Sports drinks aren't a good choice because they have too much sugar and not enough electrolytes. In this case, commercially available products called oral rehydration solutions are best.  · Desserts: Plain gelatin, popsicles, and fruit juice bars.  During the next 24 hours (the second day), you may add the following to the above if you have improved. If not, continue what you did the first day:  · Hot cereal, plain toast, bread, rolls, crackers  · Plain noodles, rice, mashed potatoes, chicken noodle or rice soup  · Unsweetened canned fruit (avoid pineapple), bananas  · Limit caffeine and chocolate. No spices or seasonings except salt.  During the next 24 hours  · Gradually resume a normal diet, as you feel better and your symptoms improve.  · If at any time your symptoms start getting worse, go back to clear liquids until you feel better.  Food preparation  · If you have diarrhea, you should not prepare food for others. When you  prepare food for yourself, wash your hands before and after.  · Wash your hands after using cutting boards, countertops, and knives that have been in contact with raw food.  · Keep uncooked meats away from cooked and ready-to-eat foods.  Follow-up care  Follow up with your healthcare provider if you are not improving over the next 2 to 3 days, or as advised. If a stool (diarrhea) sample was taken, call for the  results as directed.  When to seek medical care  Call your healthcare provider right away if any of these occur:   · Increasing abdominal pain or constant lower right abdominal pain  · Continued vomiting (unable to keep liquids down)  · Frequent diarrhea (more than 5 times a day)  · Blood in vomit or stool (black or red color)  · Inability to tolerate solid food after a few days.  · Dark urine, reduced urine output  · Weakness, dizziness  · Drowsiness  · Fever of 100.4ºF (38.0ºC) or higher, or as directed by your healthcare provider  · New rash  Call 911  Call 911 if any of these occur:  · Trouble breathing  · Chest pain  · Confusion  · Severe drowsiness or trouble awakening  · Seizure  · Stiff neck  Date Last Reviewed: 11/16/2015  © 1706-5578 [employer]. [address] [place] [address], [place] [address]. All rights reserved. This information is not intended as a substitute for professional medical care. Always follow your healthcare professional's instructions.

## 2020-12-12 NOTE — PROGRESS NOTES
"SUBJECTIVE:   60 y.o. female  presents today to establish care in survivorship. No LMP recorded. Patient has had a hysterectomy..  She has Er+, UT+,, HEr2- IDC right breast s/p bilateral mastectomy with Right SN negative on 10/29/20. She will begin Letrozole.   She is concerned about her christiane and reports having "image problems". She is not happy with how she looks. She has not yet stopped smoking- down to 10 per day. She is not interested is quitting at this time.   She is being treated for pneumonia and reports feeling "weak and tired." - has not started Letrozole for this reason.        Past Medical History:   Diagnosis Date    Anemia     Anemia     Depression     Diverticulitis     Fatty liver     GERD (gastroesophageal reflux disease)     Hyperlipidemia     Hypertension     Pancreatitis     Peptic ulcer disease     Polysubstance abuse     Posterior reversible encephalopathy syndrome     Sarcoidosis of lung     Sarcoidosis of lung     over 30 yrs ago    Seizures     2017    Suicide attempt     Suicide ideation      Past Surgical History:   Procedure Laterality Date    APPENDECTOMY      BILATERAL MASTECTOMY Bilateral 10/29/2020    Procedure: MASTECTOMY, BILATERAL;  Surgeon: Baylee Kevin MD;  Location: [facility] OR [address] [place] [address];  Service: General;  Laterality: Bilateral;    COLONOSCOPY N/A 2017    Procedure: COLONOSCOPY;  Surgeon: Aaron Alvarado MD;  Location: [facility];  Service: Endoscopy;  Laterality: N/A;    ESOPHAGOGASTRODUODENOSCOPY  10/7/2016, 2014    2016 - gastritis, duodenitis,  erosive gastritis    ESOPHAGOGASTRODUODENOSCOPY N/A 2020    Procedure: ESOPHAGOGASTRODUODENOSCOPY (EGD);  Surgeon: Fawn Garrido MD;  Location: [facility];  Service: Endoscopy;  Laterality: N/A;    FLEXIBLE SIGMOIDOSCOPY  2014    colitis    HYSTERECTOMY      INJECTION FOR SENTINEL NODE IDENTIFICATION Right 10/29/2020    Procedure: INJECTION, FOR SENTINEL NODE IDENTIFICATION;  " Surgeon: Baylee Kevin MD;  Location: [address];  Service: General;  Laterality: Right;    INJECTION OF JOINT Right 10/10/2019    Procedure: Injection, Joint RIGHT ILIOPSOAS BURSA/TENDON INJECTION AND RIGHT GLUTEAL TENDON INJECTION WITH STEROID AND LIDOCAINE;  Surgeon: Guillaume Rico MD;  Location: [facility];  Service: Pain Management;  Laterality: Right;  NEEDS CONSENT    INSERTION OF BREAST TISSUE EXPANDER Bilateral 10/29/2020    Procedure: INSERTION, TISSUE EXPANDER, BREAST;  Surgeon: Scottie Johnson MD;  Location: [address];  Service: Plastics;  Laterality: Bilateral;  Right breast: 1082 g  Left breast: 1076 g    mediastenoscopy      SENTINEL LYMPH NODE BIOPSY Right 10/29/2020    Procedure: BIOPSY, LYMPH NODE, SENTINEL;  Surgeon: Baylee Kevin MD;  Location: [address];  Service: General;  Laterality: Right;    TONSILLECTOMY N/A 1970    TUBAL LIGATION       Social History     Socioeconomic History    Marital status:      Spouse name: Not on file    Number of children: Not on file    Years of education: Not on file    Highest education level: Not on file   Occupational History    Not on file   Social Needs    Financial resource strain: Not on file    Food insecurity     Worry: Not on file     Inability: Not on file    Transportation needs     Medical: Not on file     Non-medical: Not on file   Tobacco Use    Smoking status: Current Every Day Smoker     Packs/day: 0.50     Years: 30.00     Pack years: 15.00     Types: Cigarettes    Smokeless tobacco: Never Used   Substance and Sexual Activity    Alcohol use: Not Currently     Comment: daily     Drug use: No    Sexual activity: Yes     Birth control/protection: Surgical   Lifestyle    Physical activity     Days per week: Not on file     Minutes per session: Not on file    Stress: Not on file   Relationships    Social connections     Talks on phone: Not on file     Gets together: Not on file     Attends  Restoration service: Not on file     Active member of club or organization: Not on file     Attends meetings of clubs or organizations: Not on file     Relationship status: Not on file   Other Topics Concern    Not on file   Social History Narrative    Not on file     Family History   Problem Relation Age of Onset    Heart attack Father     Diabetes Father     Hypertension Father     Diabetes Mother     Hypertension Mother     Breast cancer Maternal Aunt     Colon cancer Maternal Uncle     Breast cancer Daughter     Ovarian cancer Neg Hx     Cancer Neg Hx      OB History    Para Term  AB Living   2 2 2         SAB TAB Ectopic Multiple Live Births                  # Outcome Date GA Lbr Amandeep/2nd Weight Sex Delivery Anes PTL Lv   2 Term      Vag-Spont      1 Term      Vag-Spont              Current Outpatient Medications   Medication Sig Dispense Refill    acetaminophen (TYLENOL) 325 MG tablet Take 2 tablets (650 mg total) by mouth every 6 (six) hours as needed. 30 tablet 0    amLODIPine (NORVASC) 10 MG tablet TK 1 T PO D      ARIPiprazole (ABILIFY) 2 MG Tab 2 mg every evening.       cyclobenzaprine (FLEXERIL) 10 MG tablet TK 1 T PO Q 8 H PRF SPASMS      dronabinol (MARINOL) 2.5 MG capsule Take 1 capsule (2.5 mg total) by mouth 2 (two) times daily before meals. 20 capsule 0    FLUoxetine 20 MG capsule TK 3 CS PO QD      gabapentin (NEURONTIN) 600 MG tablet Take 600 mg by mouth 3 (three) times daily.       hydrOXYzine (ATARAX) 50 MG tablet TK 1 OR 2 TS PO BID PRN FOR ANXIETY      hydrOXYzine pamoate (VISTARIL) 50 MG Cap       ipratropium (ATROVENT HFA) 17 mcg/actuation inhaler Inhale 2 puffs into the lungs every 6 (six) hours. Rescue for 14 days 1 Inhaler 0    letrozole (FEMARA) 2.5 mg Tab Take 1 tablet (2.5 mg total) by mouth once daily. 30 tablet 11    levothyroxine (SYNTHROID) 25 MCG tablet Take 1 tablet (25 mcg total) by mouth before breakfast. 30 tablet 0    LORazepam (ATIVAN) 1  MG tablet FOLLOW ATIVAN 7 DAY TAPER      losartan (COZAAR) 25 MG tablet TK 1 T PO QD      magnesium oxide 500 mg Tab TAKE 1 TABLET BY MOUTH ONCE A DAY AT BEDTIME FOR 3 DAYS (9PM)      methylPREDNISolone (MEDROL DOSEPACK) 4 mg tablet use as directed 1 Package 0    morphine (MSIR) 15 MG tablet Take 1 tablet (15 mg total) by mouth every 4 (four) hours as needed for Pain. 8 tablet 0    multivitamin (THERAGRAN) per tablet TAKE 1 TABLET BY MOUTH ONCE A DAY (12PM)      omeprazole (PRILOSEC) 20 MG capsule Take 60 mg by mouth once daily.       ondansetron (ZOFRAN ODT) 8 MG TbDL Take 1 tablet (8 mg total) by mouth 3 (three) times daily as needed (for nausea and vomiting). 30 tablet 0    oxyCODONE (ROXICODONE) 5 MG immediate release tablet Take 2 tablets (10 mg total) by mouth every 12 (twelve) hours as needed for Pain. 28 tablet 0    rOPINIRole (REQUIP) 0.25 MG tablet every evening.       thiamine mononitrate, vit B1, (VITAMIN B-1, MONONITRATE,) 100 mg Tab TAKE 1 TABLET BY MOUTH THREE TIMES DAILY FOR 7 DAYS (6AM, 12PM, 6PM)      traMADoL (ULTRAM) 50 mg tablet TK 1 T PO Q 6 H PRN P      traZODone (DESYREL) 100 MG tablet TK 3 TS PO HS  1    valsartan (DIOVAN) 20 MG tablet Take 20 mg by mouth once daily.       No current facility-administered medications for this visit.      Allergies: Fentanyl, Lortab  [hydrocodone-acetaminophen], and Phenothiazines     The ASCVD Risk score (Exeter DC Jr., et al., 2013) failed to calculate for the following reasons:    Cannot find a previous HDL lab    Cannot find a previous total cholesterol lab      ROS:  Constitutional: no weight loss, weight gain, fever,+ fatigue  Eyes:  No vision changes, glasses/contacts  ENT/Mouth: No ulcers, sinus problems, ears ringing, headache  Cardiovascular: No inability to lie flat, chest pain, exercise intolerance, swelling, heart palpitations  Respiratory: No wheezing, coughing blood, shortness of breath, or cough  Gastrointestinal: No diarrhea,  bloody stool, nausea/vomiting, constipation, gas, hemorrhoids  Genitourinary: No blood in urine, painful urination, urgency of urination, frequency of urination, incomplete emptying, incontinence, abnormal bleeding, painful periods, heavy periods, vaginal discharge, vaginal odor, painful intercourse, sexual problems, bleeding after intercourse.  Musculoskeletal: No muscle weakness, +joint pain  Skin/Breast: +breast cancer  Neurological: No passing out, seizures, numbness, headache  Endocrine: No diabetes, hypothyroid, hyperthyroid, hot flashes, hair loss, abnormal hair growth, acne  Psychiatric: + depression, crying  Hematologic: No bruises, bleeding, swollen lymph nodes, anemia.      Physical Exam  deferred    ASSESSMENT:   Breast cancer  Depression  Smoking  History of alcohol dependence  PLAN: Face to face time 25 minutes - majority spent in counseling and arranging follow up  Counseled patient on possible side effects of Letrozole  Counseled her on smoking cessation- patient declines intervention at this time  Counseled her on possible benefits of Acupuncture  Counseled her on nutrition  Follow up with psychologist

## 2020-12-14 ENCOUNTER — TELEPHONE (OUTPATIENT)
Dept: OBSTETRICS AND GYNECOLOGY | Facility: CLINIC | Age: 60
End: 2020-12-14

## 2020-12-14 DIAGNOSIS — C50.919 MALIGNANT NEOPLASM OF BREAST IN FEMALE, ESTROGEN RECEPTOR POSITIVE, UNSPECIFIED LATERALITY, UNSPECIFIED SITE OF BREAST: ICD-10-CM

## 2020-12-14 DIAGNOSIS — M54.50 CHRONIC LOW BACK PAIN: ICD-10-CM

## 2020-12-14 DIAGNOSIS — M53.86 DECREASED ROM OF LUMBAR SPINE: ICD-10-CM

## 2020-12-14 DIAGNOSIS — G89.29 CHRONIC LOW BACK PAIN: ICD-10-CM

## 2020-12-14 DIAGNOSIS — Z17.0 MALIGNANT NEOPLASM OF BREAST IN FEMALE, ESTROGEN RECEPTOR POSITIVE, UNSPECIFIED LATERALITY, UNSPECIFIED SITE OF BREAST: ICD-10-CM

## 2020-12-14 DIAGNOSIS — M79.7 FIBROMYALGIA: ICD-10-CM

## 2020-12-14 DIAGNOSIS — M47.816 DEGENERATIVE JOINT DISEASE (DJD) OF LUMBAR SPINE: Primary | ICD-10-CM

## 2020-12-16 ENCOUNTER — OFFICE VISIT (OUTPATIENT)
Dept: PLASTIC SURGERY | Facility: CLINIC | Age: 60
End: 2020-12-16
Payer: COMMERCIAL

## 2020-12-16 VITALS — OXYGEN SATURATION: 92 % | DIASTOLIC BLOOD PRESSURE: 76 MMHG | SYSTOLIC BLOOD PRESSURE: 161 MMHG | HEART RATE: 76 BPM

## 2020-12-16 DIAGNOSIS — Z09 SURGERY FOLLOW-UP EXAMINATION: Primary | ICD-10-CM

## 2020-12-16 PROCEDURE — 99024 PR POST-OP FOLLOW-UP VISIT: ICD-10-PCS | Mod: S$GLB,,, | Performed by: PHYSICIAN ASSISTANT

## 2020-12-16 PROCEDURE — 99024 POSTOP FOLLOW-UP VISIT: CPT | Mod: S$GLB,,, | Performed by: PHYSICIAN ASSISTANT

## 2020-12-16 PROCEDURE — 99999 PR PBB SHADOW E&M-EST. PATIENT-LVL IV: ICD-10-PCS | Mod: PBBFAC,,, | Performed by: PHYSICIAN ASSISTANT

## 2020-12-16 PROCEDURE — 99999 PR PBB SHADOW E&M-EST. PATIENT-LVL IV: CPT | Mod: PBBFAC,,, | Performed by: PHYSICIAN ASSISTANT

## 2020-12-16 PROCEDURE — 1125F PR PAIN SEVERITY QUANTIFIED, PAIN PRESENT: ICD-10-PCS | Mod: S$GLB,,, | Performed by: PHYSICIAN ASSISTANT

## 2020-12-16 PROCEDURE — 1125F AMNT PAIN NOTED PAIN PRSNT: CPT | Mod: S$GLB,,, | Performed by: PHYSICIAN ASSISTANT

## 2020-12-16 NOTE — PROGRESS NOTES
Subjective:      Earl Abdul is a 60 y.o. year old female who [facility] on 12/16/2020 for follow up visit status post B breast reconstruction with TE placement on 10/29/2020 following B mastectomy by Dr Kevin. She is here today for routine follow up. She will not need any further expansion as she is pleased with the size she is currently.  Denies fever, chills, nausea, vomiting, or other systemic signs of infection.    Vitals:    12/16/20 1125   BP: (!) 161/76   Pulse: 76        Review of patient's allergies indicates:   Allergen Reactions    Fentanyl Other (See Comments)     Other reaction(s): Itching    Lortab  [hydrocodone-acetaminophen] Other (See Comments)    Phenothiazines        Current Outpatient Medications on File Prior to Visit   Medication Sig Dispense Refill    acetaminophen (TYLENOL) 325 MG tablet Take 2 tablets (650 mg total) by mouth every 6 (six) hours as needed. 30 tablet 0    amLODIPine (NORVASC) 10 MG tablet TK 1 T PO D      ARIPiprazole (ABILIFY) 2 MG Tab 2 mg every evening.       cyclobenzaprine (FLEXERIL) 10 MG tablet TK 1 T PO Q 8 H PRF SPASMS      dronabinol (MARINOL) 2.5 MG capsule Take 1 capsule (2.5 mg total) by mouth 2 (two) times daily before meals. 20 capsule 0    FLUoxetine 20 MG capsule TK 3 CS PO QD      gabapentin (NEURONTIN) 600 MG tablet Take 600 mg by mouth 3 (three) times daily.       hydrOXYzine (ATARAX) 50 MG tablet TK 1 OR 2 TS PO BID PRN FOR ANXIETY      hydrOXYzine pamoate (VISTARIL) 50 MG Cap       ipratropium (ATROVENT HFA) 17 mcg/actuation inhaler Inhale 2 puffs into the lungs every 6 (six) hours. Rescue for 14 days 1 Inhaler 0    letrozole (FEMARA) 2.5 mg Tab Take 1 tablet (2.5 mg total) by mouth once daily. 30 tablet 11    levothyroxine (SYNTHROID) 25 MCG tablet Take 1 tablet (25 mcg total) by mouth before breakfast. 30 tablet 0    LORazepam (ATIVAN) 1 MG tablet FOLLOW ATIVAN 7 DAY TAPER      losartan (COZAAR) 25  MG tablet TK 1 T PO QD      magnesium oxide 500 mg Tab TAKE 1 TABLET BY MOUTH ONCE A DAY AT BEDTIME FOR 3 DAYS (9PM)      methylPREDNISolone (MEDROL DOSEPACK) 4 mg tablet use as directed 1 Package 0    morphine (MSIR) 15 MG tablet Take 1 tablet (15 mg total) by mouth every 4 (four) hours as needed for Pain. 8 tablet 0    multivitamin (THERAGRAN) per tablet TAKE 1 TABLET BY MOUTH ONCE A DAY (12PM)      omeprazole (PRILOSEC) 20 MG capsule Take 60 mg by mouth once daily.       ondansetron (ZOFRAN ODT) 8 MG TbDL Take 1 tablet (8 mg total) by mouth 3 (three) times daily as needed (for nausea and vomiting). 30 tablet 0    oxyCODONE (ROXICODONE) 5 MG immediate release tablet Take 2 tablets (10 mg total) by mouth every 12 (twelve) hours as needed for Pain. 28 tablet 0    rOPINIRole (REQUIP) 0.25 MG tablet every evening.       thiamine mononitrate, vit B1, (VITAMIN B-1, MONONITRATE,) 100 mg Tab TAKE 1 TABLET BY MOUTH THREE TIMES DAILY FOR 7 DAYS (6AM, 12PM, 6PM)      traMADoL (ULTRAM) 50 mg tablet TK 1 T PO Q 6 H PRN P      traZODone (DESYREL) 100 MG tablet TK 3 TS PO HS  1    valsartan (DIOVAN) 20 MG tablet Take 20 mg by mouth once daily.       No current facility-administered medications on file prior to visit.        Patient Active Problem List   Diagnosis    Depression    Alcohol dependence, continuous    Alcohol withdrawal    Essential hypertension    Sarcoidosis of lung    Fibromyalgia    Tobacco abuse    Alcoholic fatty liver    Cannabis abuse, in remission    Nonspecific abnormal results of liver function study    Sarcoidosis    History of Clostridium difficile colitis    Diarrhea    Dehydration    History of substance abuse    Elevated liver enzymes    Ascites    Cirrhosis    New onset seizure    Posterior reversible encephalopathy syndrome    Depression with anxiety    Erythema nodosum    History of sarcoidosis    Hyperlipidemia    Ramírez's syndrome    Degenerative joint  disease (DJD) of lumbar spine    Decreased ROM of lumbar spine    Difficulty walking    Transaminitis    VINICIO (obstructive sleep apnea)    At risk for lymphedema    Malignant neoplasm of central portion of right breast in female, estrogen receptor positive       Past Surgical History:   Procedure Laterality Date    APPENDECTOMY      BILATERAL MASTECTOMY Bilateral 10/29/2020    Procedure: MASTECTOMY, BILATERAL;  Surgeon: Baylee Kevin MD;  Location: [address];  Service: General;  Laterality: Bilateral;    COLONOSCOPY N/A 7/28/2017    Procedure: COLONOSCOPY;  Surgeon: Aaron Alvarado MD;  Location: [facility];  Service: Endoscopy;  Laterality: N/A;    ESOPHAGOGASTRODUODENOSCOPY  10/7/2016, 11/6/2014    2016 - gastritis, duodenitis, 2014 erosive gastritis    ESOPHAGOGASTRODUODENOSCOPY N/A 2/11/2020    Procedure: ESOPHAGOGASTRODUODENOSCOPY (EGD);  Surgeon: Fawn Garrido MD;  Location: [facility];  Service: Endoscopy;  Laterality: N/A;    FLEXIBLE SIGMOIDOSCOPY  11/06/2014    colitis    HYSTERECTOMY      INJECTION FOR SENTINEL NODE IDENTIFICATION Right 10/29/2020    Procedure: INJECTION, FOR SENTINEL NODE IDENTIFICATION;  Surgeon: Baylee Kevin MD;  Location: [address];  Service: General;  Laterality: Right;    INJECTION OF JOINT Right 10/10/2019    Procedure: Injection, Joint RIGHT ILIOPSOAS BURSA/TENDON INJECTION AND RIGHT GLUTEAL TENDON INJECTION WITH STEROID AND LIDOCAINE;  Surgeon: Guillaume Rico MD;  [facility];  Service: Pain Management;  Laterality: Right;  NEEDS CONSENT    INSERTION OF BREAST TISSUE EXPANDER Bilateral 10/29/2020    Procedure: INSERTION, TISSUE EXPANDER, BREAST;  Surgeon: Scottie Johnson MD;  Location: [address];  Service: Plastics;  Laterality: Bilateral;  Right breast: 1082 g  Left breast: 1076 g    mediastenoscopy      SENTINEL LYMPH NODE BIOPSY Right 10/29/2020    Procedure: BIOPSY, LYMPH NODE, SENTINEL;  Surgeon: Baylee Kevin MD;   Location: [facility] OR [address] [place] [address];  Service: General;  Laterality: Right;    TONSILLECTOMY N/A 1970    TUBAL LIGATION         Social History     Socioeconomic History    Marital status:      Spouse name: Not on file    Number of children: Not on file    Years of education: Not on file    Highest education level: Not on file   Occupational History    Not on file   Social Needs    Financial resource strain: Not on file    Food insecurity     Worry: Not on file     Inability: Not on file    Transportation needs     Medical: Not on file     Non-medical: Not on file   Tobacco Use    Smoking status: Current Every Day Smoker     Packs/day: 0.50     Years: 30.00     Pack years: 15.00     Types: Cigarettes    Smokeless tobacco: Never Used   Substance and Sexual Activity    Alcohol use: Not Currently     Comment: daily     Drug use: No    Sexual activity: Yes     Birth control/protection: Surgical   Lifestyle    Physical activity     Days per week: Not on file     Minutes per session: Not on file    Stress: Not on file   Relationships    Social connections     Talks on phone: Not on file     Gets together: Not on file     Attends Congregation service: Not on file     Active member of club or organization: Not on file     Attends meetings of clubs or organizations: Not on file     Relationship status: Not on file   Other Topics Concern    Not on file   Social History Narrative    Not on file     Date of surgery 10/29/2020  Preoperative diagnosis breast cancer  Postoperative diagnosis the same  Procedure performed is  1.  Immediate bilateral breast reconstruction using tissue expanders  Surgeon [facility]  Anesthesia general  Complications none  Drains x4  Blood loss minimal.      Review of Systems: negative    Objective:     Physical Exam:  Vitals:    12/16/20 1125   BP: (!) 161/76   Pulse: 76       WD WN NAD  VSS  Normal resp effort  R breast -  incision CDI, no erythema or drainage, mastectomy flaps viable,  surgically absent nipple, + seroma (dependent)  L breast -  incision CDI, no erythema or drainage, mastectomy flaps viable, surgically absent nipple, + seroma (dependent)        Assessment:       1. Surgery follow-up examination        Plan:   60 y.o. female status post B breast reconstruction with TE placement  - Patient doing well today with no issues since her last visit, she was accompanied by her  who was present the entire visit.   - She will need no further expansion as she is pleased with the size of her breast. We will schedule for B implant exchange and Bilateral soft tissue revision in Jan/Feb.   - She still complains of respiratory symptoms and malaise, she has been seen in UC/ED and by her PCP with an overall negative work up and COVID negative x 2. I encouraged her to contact her PCP today to let them know she is still not feeling well after completion of Po steroids and prn INH.   - Return to clinic following surgery       All questions were answered. The patient was advised to call the clinic with any questions or concerns prior to their next visit.           Angelina Gatica PA-C  Plastic and Reconstruction Surgery Department  [phone] office

## 2020-12-23 ENCOUNTER — OFFICE VISIT (OUTPATIENT)
Dept: PULMONOLOGY | Facility: CLINIC | Age: 60
End: 2020-12-23
Payer: COMMERCIAL

## 2020-12-23 VITALS
OXYGEN SATURATION: 93 % | HEIGHT: 66 IN | DIASTOLIC BLOOD PRESSURE: 76 MMHG | BODY MASS INDEX: 30.29 KG/M2 | HEART RATE: 89 BPM | SYSTOLIC BLOOD PRESSURE: 124 MMHG | WEIGHT: 188.5 LBS

## 2020-12-23 DIAGNOSIS — J44.1 COPD EXACERBATION: Primary | ICD-10-CM

## 2020-12-23 DIAGNOSIS — F17.200 SMOKER: ICD-10-CM

## 2020-12-23 DIAGNOSIS — C50.119 MALIGNANT NEOPLASM OF CENTRAL PORTION OF FEMALE BREAST, UNSPECIFIED ESTROGEN RECEPTOR STATUS, UNSPECIFIED LATERALITY: ICD-10-CM

## 2020-12-23 PROCEDURE — 1125F PR PAIN SEVERITY QUANTIFIED, PAIN PRESENT: ICD-10-PCS | Mod: S$GLB,,, | Performed by: INTERNAL MEDICINE

## 2020-12-23 PROCEDURE — 3008F BODY MASS INDEX DOCD: CPT | Mod: CPTII,S$GLB,, | Performed by: INTERNAL MEDICINE

## 2020-12-23 PROCEDURE — 3074F PR MOST RECENT SYSTOLIC BLOOD PRESSURE < 130 MM HG: ICD-10-PCS | Mod: CPTII,S$GLB,, | Performed by: INTERNAL MEDICINE

## 2020-12-23 PROCEDURE — 99204 PR OFFICE/OUTPT VISIT, NEW, LEVL IV, 45-59 MIN: ICD-10-PCS | Mod: S$GLB,,, | Performed by: INTERNAL MEDICINE

## 2020-12-23 PROCEDURE — 3078F DIAST BP <80 MM HG: CPT | Mod: CPTII,S$GLB,, | Performed by: INTERNAL MEDICINE

## 2020-12-23 PROCEDURE — 99999 PR PBB SHADOW E&M-EST. PATIENT-LVL IV: CPT | Mod: PBBFAC,,, | Performed by: INTERNAL MEDICINE

## 2020-12-23 PROCEDURE — 3078F PR MOST RECENT DIASTOLIC BLOOD PRESSURE < 80 MM HG: ICD-10-PCS | Mod: CPTII,S$GLB,, | Performed by: INTERNAL MEDICINE

## 2020-12-23 PROCEDURE — 3074F SYST BP LT 130 MM HG: CPT | Mod: CPTII,S$GLB,, | Performed by: INTERNAL MEDICINE

## 2020-12-23 PROCEDURE — 1125F AMNT PAIN NOTED PAIN PRSNT: CPT | Mod: S$GLB,,, | Performed by: INTERNAL MEDICINE

## 2020-12-23 PROCEDURE — 3008F PR BODY MASS INDEX (BMI) DOCUMENTED: ICD-10-PCS | Mod: CPTII,S$GLB,, | Performed by: INTERNAL MEDICINE

## 2020-12-23 PROCEDURE — 99999 PR PBB SHADOW E&M-EST. PATIENT-LVL IV: ICD-10-PCS | Mod: PBBFAC,,, | Performed by: INTERNAL MEDICINE

## 2020-12-23 PROCEDURE — 99204 OFFICE O/P NEW MOD 45 MIN: CPT | Mod: S$GLB,,, | Performed by: INTERNAL MEDICINE

## 2020-12-23 RX ORDER — LEVALBUTEROL INHALATION SOLUTION 1.25 MG/3ML
1 SOLUTION RESPIRATORY (INHALATION) EVERY 4 HOURS PRN
Qty: 3 BOX | Refills: 0 | Status: SHIPPED | OUTPATIENT
Start: 2020-12-23 | End: 2021-02-22 | Stop reason: DRUGHIGH

## 2020-12-23 RX ORDER — TIOTROPIUM BROMIDE 18 UG/1
1 CAPSULE ORAL; RESPIRATORY (INHALATION) EVERY MORNING
COMMUNITY
Start: 2020-12-09 | End: 2022-03-15

## 2020-12-23 RX ORDER — IPRATROPIUM BROMIDE 0.5 MG/2.5ML
3 SOLUTION RESPIRATORY (INHALATION) 3 TIMES DAILY PRN
COMMUNITY
Start: 2020-12-14 | End: 2021-02-22 | Stop reason: DRUGHIGH

## 2020-12-23 NOTE — PROGRESS NOTES
Subjective:      Patient ID: Earl Abdul is a 60 y.o. female.    Chief Complaint: Cough, Sarcoidosis, and Shortness of Breath    HPI  59 yo active smoker is referred for assessment of COPD. She has a past hx of pulmonary sarcoid diagnosed by mediastinoscope and treated briefly. No radiographic evidence of pulmonary sarcoid on recent films.  She is currently undergoing cancer treatment and surgery Has some exertional dyspnea, but is not very active at this stage of her cancer management.       No flowsheet data found.  Review of Systems   Constitutional: Negative.    HENT: Negative.    Eyes: Negative.    Respiratory: Positive for cough and dyspnea on extertion.         Hx of pulmonary sarcoid in the distant past, no present now    Active smoker with smoker bronchitis   Cardiovascular: Negative.    Genitourinary: Negative.    Musculoskeletal: Negative.    Skin: Negative.    Gastrointestinal: Negative.         Hx of fatty liver and pancreatitis   Neurological: Negative.    Psychiatric/Behavioral: Negative.         Hx of substance abuse     Objective:     Physical Exam  Vitals signs and nursing note reviewed.   Constitutional:       General: She is not in acute distress.     Appearance: She is well-developed.   HENT:      Head: Normocephalic and atraumatic.      Right Ear: External ear normal.      Left Ear: External ear normal.      Nose: Nose normal.   Eyes:      Conjunctiva/sclera: Conjunctivae normal.      Pupils: Pupils are equal, round, and reactive to light.   Neck:      Musculoskeletal: Normal range of motion and neck supple.      Thyroid: No thyromegaly.      Vascular: No JVD.   Cardiovascular:      Rate and Rhythm: Normal rate and regular rhythm.      Heart sounds: Normal heart sounds. No murmur. No gallop.    Pulmonary:      Effort: No respiratory distress.      Breath sounds: Normal breath sounds. No stridor. No wheezing or rales.      Comments: A few end expiratory wheezes    Peak flow: 200 l/min  50%  of predicted    Sa0:93% and pulse of 89    Has used medrol dospak in the past and nebulizer with atrovent.  I suggest to reduce side effects try xopenex nebulizer tiD    QUIT SMOKING    Chest x-ray 5/4/20 Clear    Recent Chest x-ray had large H2O  Filled external soft tissue shadows from implants.    Chest:      Chest wall: No tenderness.   Abdominal:      General: Bowel sounds are normal. There is no distension.      Palpations: Abdomen is soft. There is no mass.      Tenderness: There is no abdominal tenderness. There is no guarding or rebound.   Musculoskeletal: Normal range of motion.   Lymphadenopathy:      Cervical: No cervical adenopathy.   Skin:     General: Skin is warm and dry.      Findings: No rash.   Neurological:      Mental Status: She is alert and oriented to person, place, and time.      Cranial Nerves: No cranial nerve deficit.      Deep Tendon Reflexes: Reflexes are normal and symmetric. Reflexes normal.   Psychiatric:         Behavior: Behavior normal.         Thought Content: Thought content normal.         Judgment: Judgment normal.         Assessment:     1. COPD exacerbation    2. Malignant neoplasm of central portion of female breast, unspecified estrogen receptor status, unspecified laterality    3. Smoker      Outpatient Encounter Medications as of 12/23/2020   Medication Sig Dispense Refill    acetaminophen (TYLENOL) 325 MG tablet Take 2 tablets (650 mg total) by mouth every 6 (six) hours as needed. 30 tablet 0    amLODIPine (NORVASC) 10 MG tablet TK 1 T PO D      ARIPiprazole (ABILIFY) 2 MG Tab 2 mg every evening.       cyclobenzaprine (FLEXERIL) 10 MG tablet TK 1 T PO Q 8 H PRF SPASMS      dronabinol (MARINOL) 2.5 MG capsule Take 1 capsule (2.5 mg total) by mouth 2 (two) times daily before meals. 20 capsule 0    FLUoxetine 20 MG capsule TK 3 CS PO QD      gabapentin (NEURONTIN) 600 MG tablet Take 600 mg by mouth 3 (three) times daily.       hydrOXYzine (ATARAX) 50 MG tablet TK 1  OR 2 TS PO BID PRN FOR ANXIETY      hydrOXYzine pamoate (VISTARIL) 50 MG Cap       ipratropium (ATROVENT HFA) 17 mcg/actuation inhaler Inhale 2 puffs into the lungs every 6 (six) hours. Rescue for 14 days 1 Inhaler 0    ipratropium (ATROVENT) 0.02 % nebulizer solution 3 mLs 3 (three) times daily as needed.      levalbuterol (XOPENEX) 1.25 mg/3 mL nebulizer solution Take 3 mLs (1.25 mg total) by nebulization every 4 (four) hours as needed for Wheezing. Rescue 3 Box 0    levothyroxine (SYNTHROID) 25 MCG tablet Take 1 tablet (25 mcg total) by mouth before breakfast. 30 tablet 0    LORazepam (ATIVAN) 1 MG tablet FOLLOW ATIVAN 7 DAY TAPER      losartan (COZAAR) 25 MG tablet TK 1 T PO QD      magnesium oxide 500 mg Tab TAKE 1 TABLET BY MOUTH ONCE A DAY AT BEDTIME FOR 3 DAYS (9PM)      methylPREDNISolone (MEDROL DOSEPACK) 4 mg tablet use as directed 1 Package 0    multivitamin (THERAGRAN) per tablet TAKE 1 TABLET BY MOUTH ONCE A DAY (12PM)      omeprazole (PRILOSEC) 20 MG capsule Take 60 mg by mouth once daily.       ondansetron (ZOFRAN ODT) 8 MG TbDL Take 1 tablet (8 mg total) by mouth 3 (three) times daily as needed (for nausea and vomiting). 30 tablet 0    rOPINIRole (REQUIP) 0.25 MG tablet every evening.       SPIRIVA WITH HANDIHALER 18 mcg inhalation capsule 1 capsule every morning.      thiamine mononitrate, vit B1, (VITAMIN B-1, MONONITRATE,) 100 mg Tab TAKE 1 TABLET BY MOUTH THREE TIMES DAILY FOR 7 DAYS (6AM, 12PM, 6PM)      traZODone (DESYREL) 100 MG tablet TK 3 TS PO HS  1    valsartan (DIOVAN) 20 MG tablet Take 20 mg by mouth once daily.      [DISCONTINUED] morphine (MSIR) 15 MG tablet Take 1 tablet (15 mg total) by mouth every 4 (four) hours as needed for Pain. 8 tablet 0    [DISCONTINUED] oxyCODONE (ROXICODONE) 5 MG immediate release tablet Take 2 tablets (10 mg total) by mouth every 12 (twelve) hours as needed for Pain. 28 tablet 0    [DISCONTINUED] traMADoL (ULTRAM) 50 mg tablet TK 1 T PO  Q 6 H PRN P       No facility-administered encounter medications on file as of 12/23/2020.        Plan:   Quit smoking, and use xopenex solution in nebulizer tid.  Problem List Items Addressed This Visit     None      Visit Diagnoses     COPD exacerbation    -  Primary    Malignant neoplasm of central portion of female breast, unspecified estrogen receptor status, unspecified laterality        Smoker

## 2021-01-01 ENCOUNTER — PATIENT MESSAGE (OUTPATIENT)
Dept: PULMONOLOGY | Facility: CLINIC | Age: 61
End: 2021-01-01

## 2021-01-01 ENCOUNTER — PATIENT MESSAGE (OUTPATIENT)
Dept: PLASTIC SURGERY | Facility: CLINIC | Age: 61
End: 2021-01-01

## 2021-01-13 ENCOUNTER — PATIENT MESSAGE (OUTPATIENT)
Dept: PLASTIC SURGERY | Facility: CLINIC | Age: 61
End: 2021-01-13

## 2021-01-19 DIAGNOSIS — Z85.3 PERSONAL HISTORY OF MALIGNANT NEOPLASM OF BREAST: ICD-10-CM

## 2021-01-19 DIAGNOSIS — Z01.818 PRE-OP TESTING: Primary | ICD-10-CM

## 2021-01-20 ENCOUNTER — OFFICE VISIT (OUTPATIENT)
Dept: PLASTIC SURGERY | Facility: CLINIC | Age: 61
End: 2021-01-20
Payer: COMMERCIAL

## 2021-01-20 VITALS — HEART RATE: 89 BPM | SYSTOLIC BLOOD PRESSURE: 122 MMHG | DIASTOLIC BLOOD PRESSURE: 59 MMHG

## 2021-01-20 DIAGNOSIS — Z09 SURGERY FOLLOW-UP EXAMINATION: Primary | ICD-10-CM

## 2021-01-20 PROCEDURE — 1126F PR PAIN SEVERITY QUANTIFIED, NO PAIN PRESENT: ICD-10-PCS | Mod: S$GLB,,, | Performed by: SURGERY

## 2021-01-20 PROCEDURE — 99024 PR POST-OP FOLLOW-UP VISIT: ICD-10-PCS | Mod: S$GLB,,, | Performed by: SURGERY

## 2021-01-20 PROCEDURE — 99999 PR PBB SHADOW E&M-EST. PATIENT-LVL II: CPT | Mod: PBBFAC,,, | Performed by: SURGERY

## 2021-01-20 PROCEDURE — 99999 PR PBB SHADOW E&M-EST. PATIENT-LVL II: ICD-10-PCS | Mod: PBBFAC,,, | Performed by: SURGERY

## 2021-01-20 PROCEDURE — 1126F AMNT PAIN NOTED NONE PRSNT: CPT | Mod: S$GLB,,, | Performed by: SURGERY

## 2021-01-20 PROCEDURE — 99024 POSTOP FOLLOW-UP VISIT: CPT | Mod: S$GLB,,, | Performed by: SURGERY

## 2021-01-22 ENCOUNTER — TELEPHONE (OUTPATIENT)
Dept: OBSTETRICS AND GYNECOLOGY | Facility: CLINIC | Age: 61
End: 2021-01-22

## 2021-01-28 ENCOUNTER — TELEPHONE (OUTPATIENT)
Dept: PLASTIC SURGERY | Facility: CLINIC | Age: 61
End: 2021-01-28

## 2021-02-08 ENCOUNTER — LAB VISIT (OUTPATIENT)
Dept: INTERNAL MEDICINE | Facility: CLINIC | Age: 61
End: 2021-02-08
Payer: COMMERCIAL

## 2021-02-08 DIAGNOSIS — Z01.818 PRE-OP TESTING: ICD-10-CM

## 2021-02-08 LAB — SARS-COV-2 RNA RESP QL NAA+PROBE: NOT DETECTED

## 2021-02-08 PROCEDURE — U0003 INFECTIOUS AGENT DETECTION BY NUCLEIC ACID (DNA OR RNA); SEVERE ACUTE RESPIRATORY SYNDROME CORONAVIRUS 2 (SARS-COV-2) (CORONAVIRUS DISEASE [COVID-19]), AMPLIFIED PROBE TECHNIQUE, MAKING USE OF HIGH THROUGHPUT TECHNOLOGIES AS DESCRIBED BY CMS-2020-01-R: HCPCS

## 2021-02-08 PROCEDURE — U0005 INFEC AGEN DETEC AMPLI PROBE: HCPCS

## 2021-02-10 ENCOUNTER — TELEPHONE (OUTPATIENT)
Dept: PLASTIC SURGERY | Facility: CLINIC | Age: 61
End: 2021-02-10

## 2021-02-10 ENCOUNTER — ANESTHESIA EVENT (OUTPATIENT)
Dept: SURGERY | Facility: HOSPITAL | Age: 61
End: 2021-02-10
Payer: COMMERCIAL

## 2021-02-11 ENCOUNTER — HOSPITAL ENCOUNTER (OUTPATIENT)
Facility: HOSPITAL | Age: 61
Discharge: HOME | End: 2021-02-11
Attending: SURGERY | Admitting: SURGERY
Payer: COMMERCIAL

## 2021-02-11 ENCOUNTER — ANESTHESIA (OUTPATIENT)
Dept: SURGERY | Facility: HOSPITAL | Age: 61
End: 2021-02-11
Payer: COMMERCIAL

## 2021-02-11 VITALS
RESPIRATION RATE: 16 BRPM | TEMPERATURE: 98 F | SYSTOLIC BLOOD PRESSURE: 139 MMHG | OXYGEN SATURATION: 95 % | WEIGHT: 183 LBS | DIASTOLIC BLOOD PRESSURE: 62 MMHG | HEIGHT: 66 IN | HEART RATE: 83 BPM | BODY MASS INDEX: 29.41 KG/M2

## 2021-02-11 DIAGNOSIS — Z17.0 MALIGNANT NEOPLASM OF CENTRAL PORTION OF RIGHT BREAST IN FEMALE, ESTROGEN RECEPTOR POSITIVE: Primary | ICD-10-CM

## 2021-02-11 DIAGNOSIS — C50.111 MALIGNANT NEOPLASM OF CENTRAL PORTION OF RIGHT BREAST IN FEMALE, ESTROGEN RECEPTOR POSITIVE: Primary | ICD-10-CM

## 2021-02-11 DIAGNOSIS — C50.919 BREAST CANCER: ICD-10-CM

## 2021-02-11 PROCEDURE — C1729 CATH, DRAINAGE: HCPCS | Performed by: SURGERY

## 2021-02-11 PROCEDURE — 88300 SURGICAL PATH GROSS: CPT | Mod: 26,,, | Performed by: PATHOLOGY

## 2021-02-11 PROCEDURE — 88305 TISSUE EXAM BY PATHOLOGIST: CPT | Performed by: PATHOLOGY

## 2021-02-11 PROCEDURE — 63600175 PHARM REV CODE 636 W HCPCS: Performed by: SURGERY

## 2021-02-11 PROCEDURE — 19342 PR DELAY BREAST PROS AFTER BREAST SURG: ICD-10-PCS | Mod: 50,,, | Performed by: SURGERY

## 2021-02-11 PROCEDURE — 71000044 HC DOSC ROUTINE RECOVERY FIRST HOUR: Performed by: SURGERY

## 2021-02-11 PROCEDURE — 25000003 PHARM REV CODE 250: Performed by: ANESTHESIOLOGY

## 2021-02-11 PROCEDURE — 88300 PR  SURG PATH,GROSS,LEVEL I: ICD-10-PCS | Mod: 26,,, | Performed by: PATHOLOGY

## 2021-02-11 PROCEDURE — 88305 TISSUE EXAM BY PATHOLOGIST: ICD-10-PCS | Mod: 26,,, | Performed by: PATHOLOGY

## 2021-02-11 PROCEDURE — C1789 PROSTHESIS, BREAST, IMP: HCPCS | Performed by: SURGERY

## 2021-02-11 PROCEDURE — 15839 PR EXCISE EXCESS SKIN TISSUE,OTHER: ICD-10-PCS | Mod: 51,,, | Performed by: SURGERY

## 2021-02-11 PROCEDURE — 71000015 HC POSTOP RECOV 1ST HR: Performed by: SURGERY

## 2021-02-11 PROCEDURE — D9220A PRA ANESTHESIA: Mod: ,,, | Performed by: ANESTHESIOLOGY

## 2021-02-11 PROCEDURE — 25000003 PHARM REV CODE 250: Performed by: SURGERY

## 2021-02-11 PROCEDURE — 37000008 HC ANESTHESIA 1ST 15 MINUTES: Performed by: SURGERY

## 2021-02-11 PROCEDURE — 71000045 HC DOSC ROUTINE RECOVERY EA ADD'L HR: Performed by: SURGERY

## 2021-02-11 PROCEDURE — D9220A PRA ANESTHESIA: ICD-10-PCS | Mod: ,,, | Performed by: ANESTHESIOLOGY

## 2021-02-11 PROCEDURE — 15839 EXC EXCESSIVE SKN OTHER AREA: CPT | Mod: 51,,, | Performed by: SURGERY

## 2021-02-11 PROCEDURE — 36000707: Performed by: SURGERY

## 2021-02-11 PROCEDURE — 88300 SURGICAL PATH GROSS: CPT | Performed by: PATHOLOGY

## 2021-02-11 PROCEDURE — 19342 INSJ/RPLCMT BRST IMPLT SEP D: CPT | Mod: 50,,, | Performed by: SURGERY

## 2021-02-11 PROCEDURE — 37000009 HC ANESTHESIA EA ADD 15 MINS: Performed by: SURGERY

## 2021-02-11 PROCEDURE — 63600175 PHARM REV CODE 636 W HCPCS: Performed by: ANESTHESIOLOGY

## 2021-02-11 PROCEDURE — 36000706: Performed by: SURGERY

## 2021-02-11 PROCEDURE — 88305 TISSUE EXAM BY PATHOLOGIST: CPT | Mod: 26,,, | Performed by: PATHOLOGY

## 2021-02-11 DEVICE — IMPLANTABLE DEVICE: Type: IMPLANTABLE DEVICE | Site: BREAST | Status: FUNCTIONAL

## 2021-02-11 RX ORDER — OXYCODONE AND ACETAMINOPHEN 5; 325 MG/1; MG/1
1 TABLET ORAL EVERY 4 HOURS PRN
Status: DISCONTINUED | OUTPATIENT
Start: 2021-02-11 | End: 2021-02-11 | Stop reason: HOSPADM

## 2021-02-11 RX ORDER — OXYCODONE AND ACETAMINOPHEN 5; 325 MG/1; MG/1
1 TABLET ORAL EVERY 6 HOURS PRN
Qty: 20 TABLET | Refills: 0 | Status: SHIPPED | OUTPATIENT
Start: 2021-02-11 | End: 2021-02-11 | Stop reason: SDUPTHER

## 2021-02-11 RX ORDER — KETAMINE HCL IN 0.9 % NACL 50 MG/5 ML
SYRINGE (ML) INTRAVENOUS
Status: DISCONTINUED | OUTPATIENT
Start: 2021-02-11 | End: 2021-02-11

## 2021-02-11 RX ORDER — OXYCODONE HYDROCHLORIDE 5 MG/1
5 TABLET ORAL
Status: DISCONTINUED | OUTPATIENT
Start: 2021-02-11 | End: 2021-02-11 | Stop reason: HOSPADM

## 2021-02-11 RX ORDER — FENTANYL CITRATE 50 UG/ML
INJECTION, SOLUTION INTRAMUSCULAR; INTRAVENOUS
Status: DISCONTINUED | OUTPATIENT
Start: 2021-02-11 | End: 2021-02-11

## 2021-02-11 RX ORDER — SCOLOPAMINE TRANSDERMAL SYSTEM 1 MG/1
1 PATCH, EXTENDED RELEASE TRANSDERMAL
Status: DISCONTINUED | OUTPATIENT
Start: 2021-02-11 | End: 2021-02-11 | Stop reason: HOSPADM

## 2021-02-11 RX ORDER — SUCCINYLCHOLINE CHLORIDE 20 MG/ML
INJECTION INTRAMUSCULAR; INTRAVENOUS
Status: DISCONTINUED | OUTPATIENT
Start: 2021-02-11 | End: 2021-02-11

## 2021-02-11 RX ORDER — LIDOCAINE HYDROCHLORIDE 20 MG/ML
INJECTION, SOLUTION EPIDURAL; INFILTRATION; INTRACAUDAL; PERINEURAL
Status: DISCONTINUED | OUTPATIENT
Start: 2021-02-11 | End: 2021-02-11

## 2021-02-11 RX ORDER — ONDANSETRON 2 MG/ML
INJECTION INTRAMUSCULAR; INTRAVENOUS
Status: DISCONTINUED | OUTPATIENT
Start: 2021-02-11 | End: 2021-02-11

## 2021-02-11 RX ORDER — OXYCODONE AND ACETAMINOPHEN 5; 325 MG/1; MG/1
1 TABLET ORAL EVERY 6 HOURS PRN
Qty: 20 TABLET | Refills: 0 | Status: ON HOLD | OUTPATIENT
Start: 2021-02-11 | End: 2021-04-23 | Stop reason: HOSPADM

## 2021-02-11 RX ORDER — MIDAZOLAM HYDROCHLORIDE 1 MG/ML
0.5 INJECTION INTRAMUSCULAR; INTRAVENOUS
Status: DISCONTINUED | OUTPATIENT
Start: 2021-02-11 | End: 2021-02-11 | Stop reason: HOSPADM

## 2021-02-11 RX ORDER — SODIUM CHLORIDE 0.9 % (FLUSH) 0.9 %
10 SYRINGE (ML) INJECTION
Status: DISCONTINUED | OUTPATIENT
Start: 2021-02-11 | End: 2021-02-11 | Stop reason: HOSPADM

## 2021-02-11 RX ORDER — CEFAZOLIN SODIUM 1 G/3ML
2 INJECTION, POWDER, FOR SOLUTION INTRAMUSCULAR; INTRAVENOUS
Status: COMPLETED | OUTPATIENT
Start: 2021-02-11 | End: 2021-02-11

## 2021-02-11 RX ORDER — LABETALOL HYDROCHLORIDE 5 MG/ML
INJECTION, SOLUTION INTRAVENOUS
Status: DISCONTINUED | OUTPATIENT
Start: 2021-02-11 | End: 2021-02-11

## 2021-02-11 RX ORDER — HYDROMORPHONE HYDROCHLORIDE 1 MG/ML
0.2 INJECTION, SOLUTION INTRAMUSCULAR; INTRAVENOUS; SUBCUTANEOUS EVERY 5 MIN PRN
Status: DISCONTINUED | OUTPATIENT
Start: 2021-02-11 | End: 2021-02-11 | Stop reason: HOSPADM

## 2021-02-11 RX ORDER — CIPROFLOXACIN 2 MG/ML
INJECTION, SOLUTION INTRAVENOUS
Status: COMPLETED | OUTPATIENT
Start: 2021-02-11 | End: 2021-02-11

## 2021-02-11 RX ORDER — FAMOTIDINE 10 MG/ML
INJECTION INTRAVENOUS
Status: DISCONTINUED | OUTPATIENT
Start: 2021-02-11 | End: 2021-02-11

## 2021-02-11 RX ORDER — PROPOFOL 10 MG/ML
VIAL (ML) INTRAVENOUS
Status: DISCONTINUED | OUTPATIENT
Start: 2021-02-11 | End: 2021-02-11

## 2021-02-11 RX ORDER — SULFAMETHOXAZOLE AND TRIMETHOPRIM 800; 160 MG/1; MG/1
1 TABLET ORAL 2 TIMES DAILY
Qty: 20 TABLET | Refills: 0 | Status: SHIPPED | OUTPATIENT
Start: 2021-02-11 | End: 2021-02-11 | Stop reason: SDUPTHER

## 2021-02-11 RX ORDER — NEOSTIGMINE METHYLSULFATE 0.5 MG/ML
INJECTION, SOLUTION INTRAVENOUS
Status: DISCONTINUED | OUTPATIENT
Start: 2021-02-11 | End: 2021-02-11

## 2021-02-11 RX ORDER — ONDANSETRON 2 MG/ML
4 INJECTION INTRAMUSCULAR; INTRAVENOUS DAILY PRN
Status: DISCONTINUED | OUTPATIENT
Start: 2021-02-11 | End: 2021-02-11 | Stop reason: HOSPADM

## 2021-02-11 RX ORDER — CEFAZOLIN SODIUM 1 G/3ML
INJECTION, POWDER, FOR SOLUTION INTRAMUSCULAR; INTRAVENOUS
Status: DISCONTINUED | OUTPATIENT
Start: 2021-02-11 | End: 2021-02-11 | Stop reason: HOSPADM

## 2021-02-11 RX ORDER — ROCURONIUM BROMIDE 10 MG/ML
INJECTION, SOLUTION INTRAVENOUS
Status: DISCONTINUED | OUTPATIENT
Start: 2021-02-11 | End: 2021-02-11

## 2021-02-11 RX ORDER — SULFAMETHOXAZOLE AND TRIMETHOPRIM 800; 160 MG/1; MG/1
1 TABLET ORAL 2 TIMES DAILY
Qty: 20 TABLET | Refills: 0 | Status: SHIPPED | OUTPATIENT
Start: 2021-02-11 | End: 2021-02-21

## 2021-02-11 RX ORDER — HYDROMORPHONE HYDROCHLORIDE 2 MG/ML
INJECTION, SOLUTION INTRAMUSCULAR; INTRAVENOUS; SUBCUTANEOUS
Status: DISCONTINUED | OUTPATIENT
Start: 2021-02-11 | End: 2021-02-11

## 2021-02-11 RX ADMIN — GLYCOPYRROLATE 0.6 MG: 0.2 INJECTION, SOLUTION INTRAMUSCULAR; INTRAVITREAL at 01:02

## 2021-02-11 RX ADMIN — FENTANYL CITRATE 50 MCG: 50 INJECTION, SOLUTION INTRAMUSCULAR; INTRAVENOUS at 11:02

## 2021-02-11 RX ADMIN — SUCCINYLCHOLINE CHLORIDE 180 MG: 20 INJECTION, SOLUTION INTRAMUSCULAR; INTRAVENOUS; PARENTERAL at 10:02

## 2021-02-11 RX ADMIN — HYDROMORPHONE HYDROCHLORIDE 0.2 MG: 1 INJECTION, SOLUTION INTRAMUSCULAR; INTRAVENOUS; SUBCUTANEOUS at 02:02

## 2021-02-11 RX ADMIN — Medication 10 MG: at 10:02

## 2021-02-11 RX ADMIN — Medication 5 MG: at 11:02

## 2021-02-11 RX ADMIN — Medication 10 MG: at 11:02

## 2021-02-11 RX ADMIN — Medication 20 MG: at 10:02

## 2021-02-11 RX ADMIN — HYDROMORPHONE HYDROCHLORIDE 0.2 MG: 1 INJECTION, SOLUTION INTRAMUSCULAR; INTRAVENOUS; SUBCUTANEOUS at 01:02

## 2021-02-11 RX ADMIN — HYDROMORPHONE HYDROCHLORIDE 0.25 MG: 2 INJECTION, SOLUTION INTRAMUSCULAR; INTRAVENOUS; SUBCUTANEOUS at 01:02

## 2021-02-11 RX ADMIN — OXYCODONE 5 MG: 5 TABLET ORAL at 01:02

## 2021-02-11 RX ADMIN — FENTANYL CITRATE 50 MCG: 50 INJECTION, SOLUTION INTRAMUSCULAR; INTRAVENOUS at 10:02

## 2021-02-11 RX ADMIN — FAMOTIDINE 20 MG: 10 INJECTION INTRAVENOUS at 09:02

## 2021-02-11 RX ADMIN — MIDAZOLAM HYDROCHLORIDE 2 MG: 1 INJECTION, SOLUTION INTRAMUSCULAR; INTRAVENOUS at 09:02

## 2021-02-11 RX ADMIN — SCOPALAMINE 1 PATCH: 1 PATCH, EXTENDED RELEASE TRANSDERMAL at 09:02

## 2021-02-11 RX ADMIN — PROPOFOL 200 MG: 10 INJECTION, EMULSION INTRAVENOUS at 09:02

## 2021-02-11 RX ADMIN — ROCURONIUM BROMIDE 25 MG: 10 INJECTION, SOLUTION INTRAVENOUS at 10:02

## 2021-02-11 RX ADMIN — ONDANSETRON 4 MG: 2 INJECTION INTRAMUSCULAR; INTRAVENOUS at 09:02

## 2021-02-11 RX ADMIN — HYDROMORPHONE HYDROCHLORIDE 0.5 MG: 2 INJECTION, SOLUTION INTRAMUSCULAR; INTRAVENOUS; SUBCUTANEOUS at 01:02

## 2021-02-11 RX ADMIN — ROCURONIUM BROMIDE 10 MG: 10 INJECTION, SOLUTION INTRAVENOUS at 11:02

## 2021-02-11 RX ADMIN — LIDOCAINE HYDROCHLORIDE 75 MG: 20 INJECTION, SOLUTION EPIDURAL; INFILTRATION; INTRACAUDAL at 10:02

## 2021-02-11 RX ADMIN — SODIUM CHLORIDE: 0.9 INJECTION, SOLUTION INTRAVENOUS at 10:02

## 2021-02-11 RX ADMIN — ROCURONIUM BROMIDE 10 MG: 10 INJECTION, SOLUTION INTRAVENOUS at 10:02

## 2021-02-11 RX ADMIN — NEOSTIGMINE METHYLSULFATE 5 MG: 0.5 INJECTION INTRAVENOUS at 01:02

## 2021-02-11 RX ADMIN — ROCURONIUM BROMIDE 5 MG: 10 INJECTION, SOLUTION INTRAVENOUS at 10:02

## 2021-02-11 RX ADMIN — CEFAZOLIN 2 G: 330 INJECTION, POWDER, FOR SOLUTION INTRAMUSCULAR; INTRAVENOUS at 10:02

## 2021-02-12 RX ORDER — MIDAZOLAM HYDROCHLORIDE 1 MG/ML
INJECTION, SOLUTION INTRAMUSCULAR; INTRAVENOUS
Status: DISCONTINUED | OUTPATIENT
Start: 2021-02-11 | End: 2021-02-12

## 2021-02-15 LAB
FINAL PATHOLOGIC DIAGNOSIS: NORMAL
GROSS: NORMAL
Lab: NORMAL

## 2021-02-17 ENCOUNTER — OFFICE VISIT (OUTPATIENT)
Dept: PLASTIC SURGERY | Facility: CLINIC | Age: 61
End: 2021-02-17
Payer: COMMERCIAL

## 2021-02-17 VITALS
SYSTOLIC BLOOD PRESSURE: 128 MMHG | TEMPERATURE: 98 F | WEIGHT: 183 LBS | HEIGHT: 66 IN | DIASTOLIC BLOOD PRESSURE: 58 MMHG | HEART RATE: 81 BPM | BODY MASS INDEX: 29.41 KG/M2

## 2021-02-17 DIAGNOSIS — Z09 SURGERY FOLLOW-UP EXAMINATION: Primary | ICD-10-CM

## 2021-02-17 PROCEDURE — 1126F AMNT PAIN NOTED NONE PRSNT: CPT | Mod: S$GLB,,, | Performed by: SURGERY

## 2021-02-17 PROCEDURE — 3008F BODY MASS INDEX DOCD: CPT | Mod: CPTII,S$GLB,, | Performed by: SURGERY

## 2021-02-17 PROCEDURE — 1126F PR PAIN SEVERITY QUANTIFIED, NO PAIN PRESENT: ICD-10-PCS | Mod: S$GLB,,, | Performed by: SURGERY

## 2021-02-17 PROCEDURE — 3008F PR BODY MASS INDEX (BMI) DOCUMENTED: ICD-10-PCS | Mod: CPTII,S$GLB,, | Performed by: SURGERY

## 2021-02-17 PROCEDURE — 99999 PR PBB SHADOW E&M-EST. PATIENT-LVL III: CPT | Mod: PBBFAC,,, | Performed by: SURGERY

## 2021-02-17 PROCEDURE — 99024 PR POST-OP FOLLOW-UP VISIT: ICD-10-PCS | Mod: S$GLB,,, | Performed by: SURGERY

## 2021-02-17 PROCEDURE — 99999 PR PBB SHADOW E&M-EST. PATIENT-LVL III: ICD-10-PCS | Mod: PBBFAC,,, | Performed by: SURGERY

## 2021-02-17 PROCEDURE — 99024 POSTOP FOLLOW-UP VISIT: CPT | Mod: S$GLB,,, | Performed by: SURGERY

## 2021-02-22 ENCOUNTER — OFFICE VISIT (OUTPATIENT)
Dept: HEMATOLOGY/ONCOLOGY | Facility: CLINIC | Age: 61
End: 2021-02-22
Payer: COMMERCIAL

## 2021-02-22 VITALS
BODY MASS INDEX: 30.08 KG/M2 | DIASTOLIC BLOOD PRESSURE: 51 MMHG | WEIGHT: 187.19 LBS | TEMPERATURE: 99 F | OXYGEN SATURATION: 95 % | HEIGHT: 66 IN | RESPIRATION RATE: 18 BRPM | HEART RATE: 77 BPM | SYSTOLIC BLOOD PRESSURE: 112 MMHG

## 2021-02-22 DIAGNOSIS — Z17.0 MALIGNANT NEOPLASM OF CENTRAL PORTION OF RIGHT BREAST IN FEMALE, ESTROGEN RECEPTOR POSITIVE: Primary | ICD-10-CM

## 2021-02-22 DIAGNOSIS — C50.111 MALIGNANT NEOPLASM OF CENTRAL PORTION OF RIGHT BREAST IN FEMALE, ESTROGEN RECEPTOR POSITIVE: Primary | ICD-10-CM

## 2021-02-22 PROCEDURE — 3074F SYST BP LT 130 MM HG: CPT | Mod: CPTII,S$GLB,, | Performed by: INTERNAL MEDICINE

## 2021-02-22 PROCEDURE — 3008F BODY MASS INDEX DOCD: CPT | Mod: CPTII,S$GLB,, | Performed by: INTERNAL MEDICINE

## 2021-02-22 PROCEDURE — 99999 PR PBB SHADOW E&M-EST. PATIENT-LVL V: ICD-10-PCS | Mod: PBBFAC,,, | Performed by: INTERNAL MEDICINE

## 2021-02-22 PROCEDURE — 3078F DIAST BP <80 MM HG: CPT | Mod: CPTII,S$GLB,, | Performed by: INTERNAL MEDICINE

## 2021-02-22 PROCEDURE — 99213 OFFICE O/P EST LOW 20 MIN: CPT | Mod: S$GLB,,, | Performed by: INTERNAL MEDICINE

## 2021-02-22 PROCEDURE — 3078F PR MOST RECENT DIASTOLIC BLOOD PRESSURE < 80 MM HG: ICD-10-PCS | Mod: CPTII,S$GLB,, | Performed by: INTERNAL MEDICINE

## 2021-02-22 PROCEDURE — 1126F PR PAIN SEVERITY QUANTIFIED, NO PAIN PRESENT: ICD-10-PCS | Mod: S$GLB,,, | Performed by: INTERNAL MEDICINE

## 2021-02-22 PROCEDURE — 1126F AMNT PAIN NOTED NONE PRSNT: CPT | Mod: S$GLB,,, | Performed by: INTERNAL MEDICINE

## 2021-02-22 PROCEDURE — 99999 PR PBB SHADOW E&M-EST. PATIENT-LVL V: CPT | Mod: PBBFAC,,, | Performed by: INTERNAL MEDICINE

## 2021-02-22 PROCEDURE — 99213 PR OFFICE/OUTPT VISIT, EST, LEVL III, 20-29 MIN: ICD-10-PCS | Mod: S$GLB,,, | Performed by: INTERNAL MEDICINE

## 2021-02-22 PROCEDURE — 3008F PR BODY MASS INDEX (BMI) DOCUMENTED: ICD-10-PCS | Mod: CPTII,S$GLB,, | Performed by: INTERNAL MEDICINE

## 2021-02-22 PROCEDURE — 3074F PR MOST RECENT SYSTOLIC BLOOD PRESSURE < 130 MM HG: ICD-10-PCS | Mod: CPTII,S$GLB,, | Performed by: INTERNAL MEDICINE

## 2021-02-22 RX ORDER — LORAZEPAM 2 MG/1
TABLET ORAL
Status: ON HOLD | COMMUNITY
Start: 2021-01-08 | End: 2021-04-23 | Stop reason: HOSPADM

## 2021-02-22 RX ORDER — NALTREXONE HYDROCHLORIDE 50 MG/1
TABLET, FILM COATED ORAL
Status: ON HOLD | COMMUNITY
Start: 2021-01-09 | End: 2021-04-23 | Stop reason: HOSPADM

## 2021-02-22 RX ORDER — LEVALBUTEROL TARTRATE 45 UG/1
AEROSOL, METERED ORAL
Status: ON HOLD | COMMUNITY
Start: 2021-02-02 | End: 2021-04-23 | Stop reason: HOSPADM

## 2021-02-22 RX ORDER — ARIPIPRAZOLE 5 MG/1
TABLET ORAL
Status: ON HOLD | COMMUNITY
Start: 2021-01-08 | End: 2021-04-23 | Stop reason: HOSPADM

## 2021-02-22 RX ORDER — NALTREXONE 380 MG
KIT INTRAMUSCULAR
Status: ON HOLD | COMMUNITY
Start: 2021-01-07 | End: 2021-04-23 | Stop reason: HOSPADM

## 2021-02-22 RX ORDER — OMEPRAZOLE 40 MG/1
40 CAPSULE, DELAYED RELEASE ORAL DAILY
Status: ON HOLD | COMMUNITY
Start: 2021-02-08 | End: 2021-04-23 | Stop reason: HOSPADM

## 2021-02-23 ENCOUNTER — TELEPHONE (OUTPATIENT)
Dept: HEMATOLOGY/ONCOLOGY | Facility: CLINIC | Age: 61
End: 2021-02-23

## 2021-03-03 ENCOUNTER — OFFICE VISIT (OUTPATIENT)
Dept: PLASTIC SURGERY | Facility: CLINIC | Age: 61
End: 2021-03-03
Payer: COMMERCIAL

## 2021-03-03 VITALS
WEIGHT: 187.19 LBS | HEART RATE: 82 BPM | DIASTOLIC BLOOD PRESSURE: 76 MMHG | HEIGHT: 66 IN | BODY MASS INDEX: 30.08 KG/M2 | SYSTOLIC BLOOD PRESSURE: 144 MMHG

## 2021-03-03 DIAGNOSIS — Z09 SURGERY FOLLOW-UP EXAMINATION: Primary | ICD-10-CM

## 2021-03-03 PROCEDURE — 99024 POSTOP FOLLOW-UP VISIT: CPT | Mod: S$GLB,,, | Performed by: PHYSICIAN ASSISTANT

## 2021-03-03 PROCEDURE — 99999 PR PBB SHADOW E&M-EST. PATIENT-LVL IV: CPT | Mod: PBBFAC,,, | Performed by: PHYSICIAN ASSISTANT

## 2021-03-03 PROCEDURE — 3008F PR BODY MASS INDEX (BMI) DOCUMENTED: ICD-10-PCS | Mod: CPTII,S$GLB,, | Performed by: PHYSICIAN ASSISTANT

## 2021-03-03 PROCEDURE — 1126F AMNT PAIN NOTED NONE PRSNT: CPT | Mod: S$GLB,,, | Performed by: PHYSICIAN ASSISTANT

## 2021-03-03 PROCEDURE — 3008F BODY MASS INDEX DOCD: CPT | Mod: CPTII,S$GLB,, | Performed by: PHYSICIAN ASSISTANT

## 2021-03-03 PROCEDURE — 1126F PR PAIN SEVERITY QUANTIFIED, NO PAIN PRESENT: ICD-10-PCS | Mod: S$GLB,,, | Performed by: PHYSICIAN ASSISTANT

## 2021-03-03 PROCEDURE — 99999 PR PBB SHADOW E&M-EST. PATIENT-LVL IV: ICD-10-PCS | Mod: PBBFAC,,, | Performed by: PHYSICIAN ASSISTANT

## 2021-03-03 PROCEDURE — 99024 PR POST-OP FOLLOW-UP VISIT: ICD-10-PCS | Mod: S$GLB,,, | Performed by: PHYSICIAN ASSISTANT

## 2021-04-14 ENCOUNTER — OFFICE VISIT (OUTPATIENT)
Dept: PLASTIC SURGERY | Facility: CLINIC | Age: 61
End: 2021-04-14
Payer: COMMERCIAL

## 2021-04-14 VITALS
DIASTOLIC BLOOD PRESSURE: 89 MMHG | WEIGHT: 187.19 LBS | HEART RATE: 69 BPM | HEIGHT: 66 IN | SYSTOLIC BLOOD PRESSURE: 184 MMHG | BODY MASS INDEX: 30.08 KG/M2

## 2021-04-14 DIAGNOSIS — Z09 SURGERY FOLLOW-UP EXAMINATION: Primary | ICD-10-CM

## 2021-04-14 PROCEDURE — 1125F PR PAIN SEVERITY QUANTIFIED, PAIN PRESENT: ICD-10-PCS | Mod: S$GLB,,, | Performed by: PHYSICIAN ASSISTANT

## 2021-04-14 PROCEDURE — 99024 PR POST-OP FOLLOW-UP VISIT: ICD-10-PCS | Mod: S$GLB,,, | Performed by: PHYSICIAN ASSISTANT

## 2021-04-14 PROCEDURE — 99024 POSTOP FOLLOW-UP VISIT: CPT | Mod: S$GLB,,, | Performed by: PHYSICIAN ASSISTANT

## 2021-04-14 PROCEDURE — 3008F BODY MASS INDEX DOCD: CPT | Mod: CPTII,S$GLB,, | Performed by: PHYSICIAN ASSISTANT

## 2021-04-14 PROCEDURE — 99999 PR PBB SHADOW E&M-EST. PATIENT-LVL IV: ICD-10-PCS | Mod: PBBFAC,,, | Performed by: PHYSICIAN ASSISTANT

## 2021-04-14 PROCEDURE — 1125F AMNT PAIN NOTED PAIN PRSNT: CPT | Mod: S$GLB,,, | Performed by: PHYSICIAN ASSISTANT

## 2021-04-14 PROCEDURE — 3008F PR BODY MASS INDEX (BMI) DOCUMENTED: ICD-10-PCS | Mod: CPTII,S$GLB,, | Performed by: PHYSICIAN ASSISTANT

## 2021-04-14 PROCEDURE — 99999 PR PBB SHADOW E&M-EST. PATIENT-LVL IV: CPT | Mod: PBBFAC,,, | Performed by: PHYSICIAN ASSISTANT

## 2021-04-17 ENCOUNTER — HOSPITAL ENCOUNTER (INPATIENT)
Facility: OTHER | Age: 61
LOS: 3 days | Discharge: HOME | DRG: 897 | End: 2021-04-23
Attending: EMERGENCY MEDICINE | Admitting: HOSPITALIST
Payer: COMMERCIAL

## 2021-04-17 DIAGNOSIS — K29.20 ACUTE ALCOHOLIC GASTRITIS WITHOUT HEMORRHAGE: Primary | ICD-10-CM

## 2021-04-17 DIAGNOSIS — R11.2 NAUSEA & VOMITING: ICD-10-CM

## 2021-04-17 DIAGNOSIS — R07.9 CHEST PAIN: ICD-10-CM

## 2021-04-17 DIAGNOSIS — R11.10 VOMITING AND DIARRHEA: ICD-10-CM

## 2021-04-17 DIAGNOSIS — R94.31 PROLONGED Q-T INTERVAL ON ECG: ICD-10-CM

## 2021-04-17 DIAGNOSIS — F10.930 ALCOHOL WITHDRAWAL SYNDROME WITHOUT COMPLICATION: ICD-10-CM

## 2021-04-17 DIAGNOSIS — K52.9 COLITIS: ICD-10-CM

## 2021-04-17 DIAGNOSIS — Z72.0 TOBACCO ABUSE: ICD-10-CM

## 2021-04-17 DIAGNOSIS — R19.7 VOMITING AND DIARRHEA: ICD-10-CM

## 2021-04-17 DIAGNOSIS — F10.939 ALCOHOL WITHDRAWAL: ICD-10-CM

## 2021-04-17 PROBLEM — J44.9 COPD (CHRONIC OBSTRUCTIVE PULMONARY DISEASE): Status: ACTIVE | Noted: 2021-04-17

## 2021-04-17 LAB
ABO + RH BLD: NORMAL
ALBUMIN SERPL BCP-MCNC: 4.5 G/DL (ref 3.5–5.2)
ALP SERPL-CCNC: 59 U/L (ref 55–135)
ALT SERPL W/O P-5'-P-CCNC: 16 U/L (ref 10–44)
AMPHET+METHAMPHET UR QL: NEGATIVE
ANION GAP SERPL CALC-SCNC: 15 MMOL/L (ref 8–16)
AST SERPL-CCNC: 32 U/L (ref 10–40)
BARBITURATES UR QL SCN>200 NG/ML: NEGATIVE
BASOPHILS # BLD AUTO: 0.01 K/UL (ref 0–0.2)
BASOPHILS # BLD AUTO: 0.02 K/UL (ref 0–0.2)
BASOPHILS NFR BLD: 0.1 % (ref 0–1.9)
BASOPHILS NFR BLD: 0.1 % (ref 0–1.9)
BASOPHILS NFR BLD: 0.2 % (ref 0–1.9)
BASOPHILS NFR BLD: 0.3 % (ref 0–1.9)
BENZODIAZ UR QL SCN>200 NG/ML: NEGATIVE
BILIRUB SERPL-MCNC: 0.9 MG/DL (ref 0.1–1)
BILIRUB UR QL STRIP: NEGATIVE
BLD GP AB SCN CELLS X3 SERPL QL: NORMAL
BUN SERPL-MCNC: 7 MG/DL (ref 8–23)
BZE UR QL SCN: NEGATIVE
CALCIUM SERPL-MCNC: 9.2 MG/DL (ref 8.7–10.5)
CANNABINOIDS UR QL SCN: ABNORMAL
CHLORIDE SERPL-SCNC: 100 MMOL/L (ref 95–110)
CLARITY UR: CLEAR
CO2 SERPL-SCNC: 23 MMOL/L (ref 23–29)
COLOR UR: YELLOW
CREAT SERPL-MCNC: 0.8 MG/DL (ref 0.5–1.4)
CREAT SERPL-MCNC: 0.8 MG/DL (ref 0.5–1.4)
CREAT UR-MCNC: 48.1 MG/DL (ref 15–325)
CTP QC/QA: YES
DIFFERENTIAL METHOD: ABNORMAL
EOSINOPHIL # BLD AUTO: 0 K/UL (ref 0–0.5)
EOSINOPHIL NFR BLD: 0 % (ref 0–8)
ERYTHROCYTE [DISTWIDTH] IN BLOOD BY AUTOMATED COUNT: 17.1 % (ref 11.5–14.5)
ERYTHROCYTE [DISTWIDTH] IN BLOOD BY AUTOMATED COUNT: 17.1 % (ref 11.5–14.5)
ERYTHROCYTE [DISTWIDTH] IN BLOOD BY AUTOMATED COUNT: 17.2 % (ref 11.5–14.5)
ERYTHROCYTE [DISTWIDTH] IN BLOOD BY AUTOMATED COUNT: 17.4 % (ref 11.5–14.5)
EST. GFR  (AFRICAN AMERICAN): >60 ML/MIN/1.73 M^2
EST. GFR  (NON AFRICAN AMERICAN): >60 ML/MIN/1.73 M^2
ETHANOL UR-MCNC: 24 MG/DL
GLUCOSE SERPL-MCNC: 143 MG/DL (ref 70–110)
GLUCOSE UR QL STRIP: NEGATIVE
HCT VFR BLD AUTO: 38.8 % (ref 37–48.5)
HCT VFR BLD AUTO: 39.4 % (ref 37–48.5)
HCT VFR BLD AUTO: 40.2 % (ref 37–48.5)
HCT VFR BLD AUTO: 41.7 % (ref 37–48.5)
HCV AB SERPL QL IA: NEGATIVE
HGB BLD-MCNC: 11.9 G/DL (ref 12–16)
HGB BLD-MCNC: 12.1 G/DL (ref 12–16)
HGB BLD-MCNC: 12.2 G/DL (ref 12–16)
HGB BLD-MCNC: 12.8 G/DL (ref 12–16)
HGB UR QL STRIP: NEGATIVE
HIV 1+2 AB+HIV1 P24 AG SERPL QL IA: NEGATIVE
IMM GRANULOCYTES # BLD AUTO: 0.06 K/UL (ref 0–0.04)
IMM GRANULOCYTES # BLD AUTO: 0.07 K/UL (ref 0–0.04)
IMM GRANULOCYTES NFR BLD AUTO: 0.7 % (ref 0–0.5)
IMM GRANULOCYTES NFR BLD AUTO: 0.9 % (ref 0–0.5)
IMM GRANULOCYTES NFR BLD AUTO: 0.9 % (ref 0–0.5)
IMM GRANULOCYTES NFR BLD AUTO: 1.1 % (ref 0–0.5)
KETONES UR QL STRIP: ABNORMAL
LEUKOCYTE ESTERASE UR QL STRIP: NEGATIVE
LIPASE SERPL-CCNC: 3 U/L (ref 4–60)
LYMPHOCYTES # BLD AUTO: 2.1 K/UL (ref 1–4.8)
LYMPHOCYTES # BLD AUTO: 2.3 K/UL (ref 1–4.8)
LYMPHOCYTES # BLD AUTO: 2.3 K/UL (ref 1–4.8)
LYMPHOCYTES # BLD AUTO: 2.4 K/UL (ref 1–4.8)
LYMPHOCYTES NFR BLD: 27.2 % (ref 18–48)
LYMPHOCYTES NFR BLD: 29.9 % (ref 18–48)
LYMPHOCYTES NFR BLD: 30.1 % (ref 18–48)
LYMPHOCYTES NFR BLD: 31.4 % (ref 18–48)
MCH RBC QN AUTO: 26.1 PG (ref 27–31)
MCH RBC QN AUTO: 26.2 PG (ref 27–31)
MCH RBC QN AUTO: 26.3 PG (ref 27–31)
MCH RBC QN AUTO: 26.6 PG (ref 27–31)
MCHC RBC AUTO-ENTMCNC: 30.3 G/DL (ref 32–36)
MCHC RBC AUTO-ENTMCNC: 30.7 G/DL (ref 32–36)
MCV RBC AUTO: 86 FL (ref 82–98)
MCV RBC AUTO: 87 FL (ref 82–98)
METHADONE UR QL SCN>300 NG/ML: NEGATIVE
MONOCYTES # BLD AUTO: 0.2 K/UL (ref 0.3–1)
MONOCYTES # BLD AUTO: 0.3 K/UL (ref 0.3–1)
MONOCYTES # BLD AUTO: 0.5 K/UL (ref 0.3–1)
MONOCYTES # BLD AUTO: 0.7 K/UL (ref 0.3–1)
MONOCYTES NFR BLD: 2.4 % (ref 4–15)
MONOCYTES NFR BLD: 4.9 % (ref 4–15)
MONOCYTES NFR BLD: 6.7 % (ref 4–15)
MONOCYTES NFR BLD: 8 % (ref 4–15)
NEUTROPHILS # BLD AUTO: 4.1 K/UL (ref 1.8–7.7)
NEUTROPHILS # BLD AUTO: 4.9 K/UL (ref 1.8–7.7)
NEUTROPHILS # BLD AUTO: 5 K/UL (ref 1.8–7.7)
NEUTROPHILS # BLD AUTO: 5.7 K/UL (ref 1.8–7.7)
NEUTROPHILS NFR BLD: 62.2 % (ref 38–73)
NEUTROPHILS NFR BLD: 62.4 % (ref 38–73)
NEUTROPHILS NFR BLD: 64 % (ref 38–73)
NEUTROPHILS NFR BLD: 66.5 % (ref 38–73)
NITRITE UR QL STRIP: NEGATIVE
NRBC BLD-RTO: 0 /100 WBC
OPIATES UR QL SCN: NEGATIVE
PCP UR QL SCN>25 NG/ML: NEGATIVE
PH UR STRIP: 7 [PH] (ref 5–8)
PLATELET # BLD AUTO: 85 K/UL (ref 150–450)
PLATELET # BLD AUTO: 89 K/UL (ref 150–450)
PLATELET # BLD AUTO: 90 K/UL (ref 150–450)
PLATELET # BLD AUTO: 98 K/UL (ref 150–450)
PMV BLD AUTO: 10.3 FL (ref 9.2–12.9)
PMV BLD AUTO: 10.8 FL (ref 9.2–12.9)
PMV BLD AUTO: 11 FL (ref 9.2–12.9)
PMV BLD AUTO: 11 FL (ref 9.2–12.9)
POTASSIUM SERPL-SCNC: 3.7 MMOL/L (ref 3.5–5.1)
PROT SERPL-MCNC: 8 G/DL (ref 6–8.4)
PROT UR QL STRIP: NEGATIVE
RBC # BLD AUTO: 4.48 M/UL (ref 4–5.4)
RBC # BLD AUTO: 4.6 M/UL (ref 4–5.4)
RBC # BLD AUTO: 4.68 M/UL (ref 4–5.4)
RBC # BLD AUTO: 4.88 M/UL (ref 4–5.4)
SAMPLE: NORMAL
SARS-COV-2 RDRP RESP QL NAA+PROBE: NEGATIVE
SODIUM SERPL-SCNC: 138 MMOL/L (ref 136–145)
SP GR UR STRIP: 1.01 (ref 1–1.03)
TOXICOLOGY INFORMATION: ABNORMAL
TROPONIN I SERPL DL<=0.01 NG/ML-MCNC: 0.01 NG/ML (ref 0–0.03)
TSH SERPL DL<=0.005 MIU/L-ACNC: 0.95 UIU/ML (ref 0.4–4)
URN SPEC COLLECT METH UR: ABNORMAL
UROBILINOGEN UR STRIP-ACNC: NEGATIVE EU/DL
WBC # BLD AUTO: 6.57 K/UL (ref 3.9–12.7)
WBC # BLD AUTO: 7.51 K/UL (ref 3.9–12.7)
WBC # BLD AUTO: 7.79 K/UL (ref 3.9–12.7)
WBC # BLD AUTO: 8.92 K/UL (ref 3.9–12.7)

## 2021-04-17 PROCEDURE — 83690 ASSAY OF LIPASE: CPT | Performed by: EMERGENCY MEDICINE

## 2021-04-17 PROCEDURE — 86703 HIV-1/HIV-2 1 RESULT ANTBDY: CPT | Performed by: EMERGENCY MEDICINE

## 2021-04-17 PROCEDURE — 96367 TX/PROPH/DG ADDL SEQ IV INF: CPT

## 2021-04-17 PROCEDURE — 96366 THER/PROPH/DIAG IV INF ADDON: CPT

## 2021-04-17 PROCEDURE — C9113 INJ PANTOPRAZOLE SODIUM, VIA: HCPCS | Performed by: EMERGENCY MEDICINE

## 2021-04-17 PROCEDURE — 94640 AIRWAY INHALATION TREATMENT: CPT

## 2021-04-17 PROCEDURE — 96361 HYDRATE IV INFUSION ADD-ON: CPT

## 2021-04-17 PROCEDURE — 27000221 HC OXYGEN, UP TO 24 HOURS

## 2021-04-17 PROCEDURE — 96375 TX/PRO/DX INJ NEW DRUG ADDON: CPT

## 2021-04-17 PROCEDURE — 99220 PR INITIAL OBSERVATION CARE,LEVL III: ICD-10-PCS | Mod: ,,, | Performed by: INTERNAL MEDICINE

## 2021-04-17 PROCEDURE — 84443 ASSAY THYROID STIM HORMONE: CPT | Performed by: INTERNAL MEDICINE

## 2021-04-17 PROCEDURE — 94761 N-INVAS EAR/PLS OXIMETRY MLT: CPT

## 2021-04-17 PROCEDURE — 80053 COMPREHEN METABOLIC PANEL: CPT | Performed by: EMERGENCY MEDICINE

## 2021-04-17 PROCEDURE — 99285 EMERGENCY DEPT VISIT HI MDM: CPT | Mod: 25

## 2021-04-17 PROCEDURE — 25500020 PHARM REV CODE 255: Performed by: EMERGENCY MEDICINE

## 2021-04-17 PROCEDURE — 86900 BLOOD TYPING SEROLOGIC ABO: CPT | Performed by: INTERNAL MEDICINE

## 2021-04-17 PROCEDURE — 96365 THER/PROPH/DIAG IV INF INIT: CPT | Mod: 59

## 2021-04-17 PROCEDURE — 93010 EKG 12-LEAD: ICD-10-PCS | Mod: ,,, | Performed by: INTERNAL MEDICINE

## 2021-04-17 PROCEDURE — 93010 ELECTROCARDIOGRAM REPORT: CPT | Mod: ,,, | Performed by: INTERNAL MEDICINE

## 2021-04-17 PROCEDURE — 85025 COMPLETE CBC W/AUTO DIFF WBC: CPT | Performed by: EMERGENCY MEDICINE

## 2021-04-17 PROCEDURE — S4991 NICOTINE PATCH NONLEGEND: HCPCS | Performed by: INTERNAL MEDICINE

## 2021-04-17 PROCEDURE — 96365 THER/PROPH/DIAG IV INF INIT: CPT

## 2021-04-17 PROCEDURE — 36415 COLL VENOUS BLD VENIPUNCTURE: CPT | Performed by: INTERNAL MEDICINE

## 2021-04-17 PROCEDURE — G0378 HOSPITAL OBSERVATION PER HR: HCPCS

## 2021-04-17 PROCEDURE — 86803 HEPATITIS C AB TEST: CPT | Performed by: EMERGENCY MEDICINE

## 2021-04-17 PROCEDURE — 63600175 PHARM REV CODE 636 W HCPCS: Performed by: EMERGENCY MEDICINE

## 2021-04-17 PROCEDURE — 85025 COMPLETE CBC W/AUTO DIFF WBC: CPT | Mod: 91 | Performed by: INTERNAL MEDICINE

## 2021-04-17 PROCEDURE — 81003 URINALYSIS AUTO W/O SCOPE: CPT | Performed by: EMERGENCY MEDICINE

## 2021-04-17 PROCEDURE — 99220 PR INITIAL OBSERVATION CARE,LEVL III: CPT | Mod: ,,, | Performed by: INTERNAL MEDICINE

## 2021-04-17 PROCEDURE — 96372 THER/PROPH/DIAG INJ SC/IM: CPT | Mod: 59

## 2021-04-17 PROCEDURE — U0002 COVID-19 LAB TEST NON-CDC: HCPCS | Performed by: EMERGENCY MEDICINE

## 2021-04-17 PROCEDURE — 25000003 PHARM REV CODE 250: Performed by: INTERNAL MEDICINE

## 2021-04-17 PROCEDURE — 63600175 PHARM REV CODE 636 W HCPCS: Performed by: INTERNAL MEDICINE

## 2021-04-17 PROCEDURE — 96376 TX/PRO/DX INJ SAME DRUG ADON: CPT

## 2021-04-17 PROCEDURE — 25000242 PHARM REV CODE 250 ALT 637 W/ HCPCS: Performed by: INTERNAL MEDICINE

## 2021-04-17 PROCEDURE — 80307 DRUG TEST PRSMV CHEM ANLYZR: CPT | Performed by: EMERGENCY MEDICINE

## 2021-04-17 PROCEDURE — 25000003 PHARM REV CODE 250: Performed by: EMERGENCY MEDICINE

## 2021-04-17 PROCEDURE — 93005 ELECTROCARDIOGRAM TRACING: CPT

## 2021-04-17 PROCEDURE — 63600175 PHARM REV CODE 636 W HCPCS

## 2021-04-17 PROCEDURE — 84484 ASSAY OF TROPONIN QUANT: CPT | Performed by: INTERNAL MEDICINE

## 2021-04-17 RX ORDER — FOLIC ACID 1 MG/1
1 TABLET ORAL DAILY
Status: CANCELLED | OUTPATIENT
Start: 2021-04-17

## 2021-04-17 RX ORDER — AMLODIPINE BESYLATE 5 MG/1
10 TABLET ORAL NIGHTLY
Status: DISCONTINUED | OUTPATIENT
Start: 2021-04-17 | End: 2021-04-17

## 2021-04-17 RX ORDER — LABETALOL HYDROCHLORIDE 5 MG/ML
20 INJECTION, SOLUTION INTRAVENOUS EVERY 6 HOURS PRN
Status: DISCONTINUED | OUTPATIENT
Start: 2021-04-17 | End: 2021-04-20

## 2021-04-17 RX ORDER — ONDANSETRON 2 MG/ML
4 INJECTION INTRAMUSCULAR; INTRAVENOUS
Status: COMPLETED | OUTPATIENT
Start: 2021-04-17 | End: 2021-04-17

## 2021-04-17 RX ORDER — SODIUM CHLORIDE, SODIUM LACTATE, POTASSIUM CHLORIDE, CALCIUM CHLORIDE 600; 310; 30; 20 MG/100ML; MG/100ML; MG/100ML; MG/100ML
INJECTION, SOLUTION INTRAVENOUS CONTINUOUS
Status: DISCONTINUED | OUTPATIENT
Start: 2021-04-17 | End: 2021-04-19

## 2021-04-17 RX ORDER — LANOLIN ALCOHOL/MO/W.PET/CERES
100 CREAM (GRAM) TOPICAL DAILY
Status: DISCONTINUED | OUTPATIENT
Start: 2021-04-17 | End: 2021-04-17

## 2021-04-17 RX ORDER — SODIUM CHLORIDE 0.9 % (FLUSH) 0.9 %
10 SYRINGE (ML) INJECTION
Status: DISCONTINUED | OUTPATIENT
Start: 2021-04-17 | End: 2021-04-20

## 2021-04-17 RX ORDER — IPRATROPIUM BROMIDE AND ALBUTEROL SULFATE 2.5; .5 MG/3ML; MG/3ML
3 SOLUTION RESPIRATORY (INHALATION) EVERY 6 HOURS PRN
Status: DISCONTINUED | OUTPATIENT
Start: 2021-04-17 | End: 2021-04-23 | Stop reason: HOSPADM

## 2021-04-17 RX ORDER — LEVOTHYROXINE SODIUM 25 UG/1
25 TABLET ORAL
Status: DISCONTINUED | OUTPATIENT
Start: 2021-04-18 | End: 2021-04-23 | Stop reason: HOSPADM

## 2021-04-17 RX ORDER — DICYCLOMINE HYDROCHLORIDE 10 MG/ML
20 INJECTION INTRAMUSCULAR
Status: COMPLETED | OUTPATIENT
Start: 2021-04-17 | End: 2021-04-17

## 2021-04-17 RX ORDER — IBUPROFEN 200 MG
16 TABLET ORAL
Status: DISCONTINUED | OUTPATIENT
Start: 2021-04-17 | End: 2021-04-23 | Stop reason: HOSPADM

## 2021-04-17 RX ORDER — KETOROLAC TROMETHAMINE 30 MG/ML
30 INJECTION, SOLUTION INTRAMUSCULAR; INTRAVENOUS
Status: COMPLETED | OUTPATIENT
Start: 2021-04-17 | End: 2021-04-17

## 2021-04-17 RX ORDER — HYDRALAZINE HYDROCHLORIDE 20 MG/ML
10 INJECTION INTRAMUSCULAR; INTRAVENOUS EVERY 6 HOURS PRN
Status: DISCONTINUED | OUTPATIENT
Start: 2021-04-17 | End: 2021-04-20

## 2021-04-17 RX ORDER — ACETAMINOPHEN 325 MG/1
650 TABLET ORAL EVERY 8 HOURS PRN
Status: DISCONTINUED | OUTPATIENT
Start: 2021-04-17 | End: 2021-04-20

## 2021-04-17 RX ORDER — IBUPROFEN 200 MG
24 TABLET ORAL
Status: DISCONTINUED | OUTPATIENT
Start: 2021-04-17 | End: 2021-04-23 | Stop reason: HOSPADM

## 2021-04-17 RX ORDER — DIAZEPAM 10 MG/2ML
5 INJECTION INTRAMUSCULAR EVERY 8 HOURS
Status: DISCONTINUED | OUTPATIENT
Start: 2021-04-17 | End: 2021-04-19

## 2021-04-17 RX ORDER — METOCLOPRAMIDE HYDROCHLORIDE 5 MG/ML
10 INJECTION INTRAMUSCULAR; INTRAVENOUS EVERY 6 HOURS
Status: DISCONTINUED | OUTPATIENT
Start: 2021-04-17 | End: 2021-04-23 | Stop reason: HOSPADM

## 2021-04-17 RX ORDER — ONDANSETRON 2 MG/ML
4 INJECTION INTRAMUSCULAR; INTRAVENOUS EVERY 6 HOURS PRN
Status: DISCONTINUED | OUTPATIENT
Start: 2021-04-17 | End: 2021-04-18

## 2021-04-17 RX ORDER — ACETAMINOPHEN 325 MG/1
650 TABLET ORAL EVERY 4 HOURS PRN
Status: DISCONTINUED | OUTPATIENT
Start: 2021-04-17 | End: 2021-04-20

## 2021-04-17 RX ORDER — DIAZEPAM 10 MG/2ML
5 INJECTION INTRAMUSCULAR
Status: COMPLETED | OUTPATIENT
Start: 2021-04-17 | End: 2021-04-17

## 2021-04-17 RX ORDER — ENOXAPARIN SODIUM 100 MG/ML
40 INJECTION SUBCUTANEOUS EVERY 24 HOURS
Status: DISCONTINUED | OUTPATIENT
Start: 2021-04-17 | End: 2021-04-17

## 2021-04-17 RX ORDER — FLUOXETINE 10 MG/1
20 CAPSULE ORAL DAILY
Status: CANCELLED | OUTPATIENT
Start: 2021-04-17

## 2021-04-17 RX ORDER — ONDANSETRON 2 MG/ML
4 INJECTION INTRAMUSCULAR; INTRAVENOUS EVERY 8 HOURS PRN
Status: DISCONTINUED | OUTPATIENT
Start: 2021-04-17 | End: 2021-04-17

## 2021-04-17 RX ORDER — IBUPROFEN 200 MG
1 TABLET ORAL DAILY
Status: DISCONTINUED | OUTPATIENT
Start: 2021-04-17 | End: 2021-04-23 | Stop reason: HOSPADM

## 2021-04-17 RX ORDER — NALTREXONE HYDROCHLORIDE 50 MG/1
50 TABLET, FILM COATED ORAL DAILY
Status: DISCONTINUED | OUTPATIENT
Start: 2021-04-17 | End: 2021-04-17

## 2021-04-17 RX ORDER — LOSARTAN POTASSIUM 25 MG/1
25 TABLET ORAL NIGHTLY
Status: DISCONTINUED | OUTPATIENT
Start: 2021-04-17 | End: 2021-04-17

## 2021-04-17 RX ORDER — THIAMINE HYDROCHLORIDE 100 MG/ML
100 INJECTION, SOLUTION INTRAMUSCULAR; INTRAVENOUS DAILY
Status: DISCONTINUED | OUTPATIENT
Start: 2021-04-17 | End: 2021-04-17

## 2021-04-17 RX ORDER — PANTOPRAZOLE SODIUM 40 MG/10ML
40 INJECTION, POWDER, LYOPHILIZED, FOR SOLUTION INTRAVENOUS DAILY
Status: DISCONTINUED | OUTPATIENT
Start: 2021-04-18 | End: 2021-04-19

## 2021-04-17 RX ORDER — GLUCAGON 1 MG
1 KIT INJECTION
Status: DISCONTINUED | OUTPATIENT
Start: 2021-04-17 | End: 2021-04-23 | Stop reason: HOSPADM

## 2021-04-17 RX ORDER — PANTOPRAZOLE SODIUM 40 MG/10ML
40 INJECTION, POWDER, LYOPHILIZED, FOR SOLUTION INTRAVENOUS
Status: COMPLETED | OUTPATIENT
Start: 2021-04-17 | End: 2021-04-17

## 2021-04-17 RX ORDER — GABAPENTIN 300 MG/1
600 CAPSULE ORAL 3 TIMES DAILY
Status: DISCONTINUED | OUTPATIENT
Start: 2021-04-17 | End: 2021-04-20

## 2021-04-17 RX ORDER — ONDANSETRON 2 MG/ML
INJECTION INTRAMUSCULAR; INTRAVENOUS
Status: COMPLETED
Start: 2021-04-17 | End: 2021-04-17

## 2021-04-17 RX ORDER — HYDROXYZINE PAMOATE 25 MG/1
50 CAPSULE ORAL EVERY 8 HOURS PRN
Status: DISCONTINUED | OUTPATIENT
Start: 2021-04-17 | End: 2021-04-20

## 2021-04-17 RX ORDER — ARIPIPRAZOLE 5 MG/1
5 TABLET ORAL 2 TIMES DAILY
Status: DISCONTINUED | OUTPATIENT
Start: 2021-04-17 | End: 2021-04-23 | Stop reason: HOSPADM

## 2021-04-17 RX ORDER — FOLIC ACID 5 MG/ML
1 INJECTION, SOLUTION INTRAMUSCULAR; INTRAVENOUS; SUBCUTANEOUS DAILY
Status: DISCONTINUED | OUTPATIENT
Start: 2021-04-17 | End: 2021-04-17

## 2021-04-17 RX ADMIN — METOCLOPRAMIDE 10 MG: 5 INJECTION, SOLUTION INTRAMUSCULAR; INTRAVENOUS at 05:04

## 2021-04-17 RX ADMIN — LABETALOL HYDROCHLORIDE 20 MG: 5 INJECTION INTRAVENOUS at 08:04

## 2021-04-17 RX ADMIN — THIAMINE HYDROCHLORIDE 100 MG: 100 INJECTION, SOLUTION INTRAMUSCULAR; INTRAVENOUS at 01:04

## 2021-04-17 RX ADMIN — GABAPENTIN 600 MG: 300 CAPSULE ORAL at 12:04

## 2021-04-17 RX ADMIN — DICYCLOMINE HYDROCHLORIDE 20 MG: 20 INJECTION, SOLUTION INTRAMUSCULAR at 05:04

## 2021-04-17 RX ADMIN — ACETAMINOPHEN 650 MG: 325 TABLET, FILM COATED ORAL at 12:04

## 2021-04-17 RX ADMIN — SODIUM CHLORIDE 1000 ML: 0.9 INJECTION, SOLUTION INTRAVENOUS at 06:04

## 2021-04-17 RX ADMIN — IOHEXOL 75 ML: 350 INJECTION, SOLUTION INTRAVENOUS at 05:04

## 2021-04-17 RX ADMIN — ONDANSETRON 4 MG: 2 INJECTION INTRAMUSCULAR; INTRAVENOUS at 04:04

## 2021-04-17 RX ADMIN — LORAZEPAM 2 MG: 2 INJECTION INTRAMUSCULAR; INTRAVENOUS at 06:04

## 2021-04-17 RX ADMIN — Medication 1 PATCH: at 12:04

## 2021-04-17 RX ADMIN — DIAZEPAM 5 MG: 5 INJECTION, SOLUTION INTRAMUSCULAR; INTRAVENOUS at 04:04

## 2021-04-17 RX ADMIN — SODIUM CHLORIDE 1000 ML: 0.9 INJECTION, SOLUTION INTRAVENOUS at 04:04

## 2021-04-17 RX ADMIN — HYDRALAZINE HYDROCHLORIDE 10 MG: 20 INJECTION, SOLUTION INTRAMUSCULAR; INTRAVENOUS at 05:04

## 2021-04-17 RX ADMIN — OCTREOTIDE ACETATE 50 MCG/HR: 500 INJECTION, SOLUTION INTRAVENOUS; SUBCUTANEOUS at 01:04

## 2021-04-17 RX ADMIN — PANTOPRAZOLE SODIUM 40 MG: 40 INJECTION, POWDER, FOR SOLUTION INTRAVENOUS at 04:04

## 2021-04-17 RX ADMIN — ONDANSETRON 4 MG: 2 INJECTION INTRAMUSCULAR; INTRAVENOUS at 12:04

## 2021-04-17 RX ADMIN — ARIPIPRAZOLE 5 MG: 5 TABLET ORAL at 08:04

## 2021-04-17 RX ADMIN — METOCLOPRAMIDE 10 MG: 5 INJECTION, SOLUTION INTRAMUSCULAR; INTRAVENOUS at 11:04

## 2021-04-17 RX ADMIN — PROMETHAZINE HYDROCHLORIDE 12.5 MG: 25 INJECTION INTRAMUSCULAR; INTRAVENOUS at 06:04

## 2021-04-17 RX ADMIN — TIOTROPIUM BROMIDE INHALATION SPRAY 2 PUFF: 3.12 SPRAY, METERED RESPIRATORY (INHALATION) at 12:04

## 2021-04-17 RX ADMIN — METOCLOPRAMIDE 10 MG: 5 INJECTION, SOLUTION INTRAMUSCULAR; INTRAVENOUS at 12:04

## 2021-04-17 RX ADMIN — GABAPENTIN 600 MG: 300 CAPSULE ORAL at 04:04

## 2021-04-17 RX ADMIN — LABETALOL HYDROCHLORIDE 20 MG: 5 INJECTION INTRAVENOUS at 12:04

## 2021-04-17 RX ADMIN — Medication 15 ML: at 12:04

## 2021-04-17 RX ADMIN — ARIPIPRAZOLE 5 MG: 5 TABLET ORAL at 12:04

## 2021-04-17 RX ADMIN — LORAZEPAM 1 MG: 2 INJECTION INTRAMUSCULAR; INTRAVENOUS at 06:04

## 2021-04-17 RX ADMIN — DIAZEPAM 5 MG: 5 INJECTION, SOLUTION INTRAMUSCULAR; INTRAVENOUS at 01:04

## 2021-04-17 RX ADMIN — HYDROXYZINE PAMOATE 50 MG: 25 CAPSULE ORAL at 12:04

## 2021-04-17 RX ADMIN — DIAZEPAM 5 MG: 5 INJECTION, SOLUTION INTRAMUSCULAR; INTRAVENOUS at 11:04

## 2021-04-17 RX ADMIN — SODIUM CHLORIDE, SODIUM LACTATE, POTASSIUM CHLORIDE, AND CALCIUM CHLORIDE: .6; .31; .03; .02 INJECTION, SOLUTION INTRAVENOUS at 06:04

## 2021-04-17 RX ADMIN — GABAPENTIN 600 MG: 300 CAPSULE ORAL at 08:04

## 2021-04-17 RX ADMIN — FOLIC ACID 1 MG: 5 INJECTION, SOLUTION INTRAMUSCULAR; INTRAVENOUS; SUBCUTANEOUS at 03:04

## 2021-04-17 RX ADMIN — KETOROLAC TROMETHAMINE 30 MG: 30 INJECTION, SOLUTION INTRAMUSCULAR; INTRAVENOUS at 04:04

## 2021-04-18 PROBLEM — R94.31 PROLONGED QT INTERVAL: Status: ACTIVE | Noted: 2021-04-18

## 2021-04-18 LAB
ALBUMIN SERPL BCP-MCNC: 3.6 G/DL (ref 3.5–5.2)
ALP SERPL-CCNC: 48 U/L (ref 55–135)
ALT SERPL W/O P-5'-P-CCNC: 11 U/L (ref 10–44)
ANION GAP SERPL CALC-SCNC: 11 MMOL/L (ref 8–16)
AST SERPL-CCNC: 32 U/L (ref 10–40)
BASOPHILS # BLD AUTO: 0.02 K/UL (ref 0–0.2)
BASOPHILS # BLD AUTO: 0.02 K/UL (ref 0–0.2)
BASOPHILS NFR BLD: 0.3 % (ref 0–1.9)
BASOPHILS NFR BLD: 0.3 % (ref 0–1.9)
BILIRUB SERPL-MCNC: 0.9 MG/DL (ref 0.1–1)
BUN SERPL-MCNC: 9 MG/DL (ref 8–23)
CALCIUM SERPL-MCNC: 8.5 MG/DL (ref 8.7–10.5)
CHLORIDE SERPL-SCNC: 100 MMOL/L (ref 95–110)
CO2 SERPL-SCNC: 27 MMOL/L (ref 23–29)
CREAT SERPL-MCNC: 0.8 MG/DL (ref 0.5–1.4)
DIFFERENTIAL METHOD: ABNORMAL
DIFFERENTIAL METHOD: ABNORMAL
EOSINOPHIL # BLD AUTO: 0 K/UL (ref 0–0.5)
EOSINOPHIL # BLD AUTO: 0 K/UL (ref 0–0.5)
EOSINOPHIL NFR BLD: 0.1 % (ref 0–8)
EOSINOPHIL NFR BLD: 0.3 % (ref 0–8)
ERYTHROCYTE [DISTWIDTH] IN BLOOD BY AUTOMATED COUNT: 17.2 % (ref 11.5–14.5)
ERYTHROCYTE [DISTWIDTH] IN BLOOD BY AUTOMATED COUNT: 17.3 % (ref 11.5–14.5)
EST. GFR  (AFRICAN AMERICAN): >60 ML/MIN/1.73 M^2
EST. GFR  (NON AFRICAN AMERICAN): >60 ML/MIN/1.73 M^2
GLUCOSE SERPL-MCNC: 97 MG/DL (ref 70–110)
HCT VFR BLD AUTO: 39.2 % (ref 37–48.5)
HCT VFR BLD AUTO: 39.6 % (ref 37–48.5)
HGB BLD-MCNC: 11.9 G/DL (ref 12–16)
HGB BLD-MCNC: 12.1 G/DL (ref 12–16)
IMM GRANULOCYTES # BLD AUTO: 0.02 K/UL (ref 0–0.04)
IMM GRANULOCYTES # BLD AUTO: 0.03 K/UL (ref 0–0.04)
IMM GRANULOCYTES NFR BLD AUTO: 0.3 % (ref 0–0.5)
IMM GRANULOCYTES NFR BLD AUTO: 0.4 % (ref 0–0.5)
LYMPHOCYTES # BLD AUTO: 2.4 K/UL (ref 1–4.8)
LYMPHOCYTES # BLD AUTO: 2.8 K/UL (ref 1–4.8)
LYMPHOCYTES NFR BLD: 36.9 % (ref 18–48)
LYMPHOCYTES NFR BLD: 39.4 % (ref 18–48)
MAGNESIUM SERPL-MCNC: 1.6 MG/DL (ref 1.6–2.6)
MCH RBC QN AUTO: 26.1 PG (ref 27–31)
MCH RBC QN AUTO: 26.4 PG (ref 27–31)
MCHC RBC AUTO-ENTMCNC: 30.4 G/DL (ref 32–36)
MCHC RBC AUTO-ENTMCNC: 30.6 G/DL (ref 32–36)
MCV RBC AUTO: 86 FL (ref 82–98)
MCV RBC AUTO: 87 FL (ref 82–98)
MONOCYTES # BLD AUTO: 0.7 K/UL (ref 0.3–1)
MONOCYTES # BLD AUTO: 0.8 K/UL (ref 0.3–1)
MONOCYTES NFR BLD: 10.7 % (ref 4–15)
MONOCYTES NFR BLD: 11.5 % (ref 4–15)
NEUTROPHILS # BLD AUTO: 3.3 K/UL (ref 1.8–7.7)
NEUTROPHILS # BLD AUTO: 3.4 K/UL (ref 1.8–7.7)
NEUTROPHILS NFR BLD: 48.3 % (ref 38–73)
NEUTROPHILS NFR BLD: 51.5 % (ref 38–73)
NRBC BLD-RTO: 0 /100 WBC
NRBC BLD-RTO: 0 /100 WBC
PHOSPHATE SERPL-MCNC: 2.3 MG/DL (ref 2.7–4.5)
PLATELET # BLD AUTO: 100 K/UL (ref 150–450)
PLATELET # BLD AUTO: 90 K/UL (ref 150–450)
PLATELET BLD QL SMEAR: ABNORMAL
PMV BLD AUTO: 10.2 FL (ref 9.2–12.9)
PMV BLD AUTO: 10.6 FL (ref 9.2–12.9)
POTASSIUM SERPL-SCNC: 3.6 MMOL/L (ref 3.5–5.1)
PROT SERPL-MCNC: 6.6 G/DL (ref 6–8.4)
RBC # BLD AUTO: 4.56 M/UL (ref 4–5.4)
RBC # BLD AUTO: 4.58 M/UL (ref 4–5.4)
SODIUM SERPL-SCNC: 138 MMOL/L (ref 136–145)
WBC # BLD AUTO: 6.47 K/UL (ref 3.9–12.7)
WBC # BLD AUTO: 7.11 K/UL (ref 3.9–12.7)

## 2021-04-18 PROCEDURE — 85025 COMPLETE CBC W/AUTO DIFF WBC: CPT | Performed by: INTERNAL MEDICINE

## 2021-04-18 PROCEDURE — 93005 ELECTROCARDIOGRAM TRACING: CPT

## 2021-04-18 PROCEDURE — 84100 ASSAY OF PHOSPHORUS: CPT | Performed by: INTERNAL MEDICINE

## 2021-04-18 PROCEDURE — 99226 PR SUBSEQUENT OBSERVATION CARE,LEVEL III: ICD-10-PCS | Mod: ,,, | Performed by: INTERNAL MEDICINE

## 2021-04-18 PROCEDURE — 94640 AIRWAY INHALATION TREATMENT: CPT

## 2021-04-18 PROCEDURE — C9113 INJ PANTOPRAZOLE SODIUM, VIA: HCPCS | Performed by: INTERNAL MEDICINE

## 2021-04-18 PROCEDURE — 96375 TX/PRO/DX INJ NEW DRUG ADDON: CPT

## 2021-04-18 PROCEDURE — 27000221 HC OXYGEN, UP TO 24 HOURS

## 2021-04-18 PROCEDURE — 93010 ELECTROCARDIOGRAM REPORT: CPT | Mod: ,,, | Performed by: INTERNAL MEDICINE

## 2021-04-18 PROCEDURE — 96367 TX/PROPH/DG ADDL SEQ IV INF: CPT

## 2021-04-18 PROCEDURE — 93010 EKG 12-LEAD: ICD-10-PCS | Mod: ,,, | Performed by: INTERNAL MEDICINE

## 2021-04-18 PROCEDURE — 94761 N-INVAS EAR/PLS OXIMETRY MLT: CPT

## 2021-04-18 PROCEDURE — 36415 COLL VENOUS BLD VENIPUNCTURE: CPT | Performed by: INTERNAL MEDICINE

## 2021-04-18 PROCEDURE — 96366 THER/PROPH/DIAG IV INF ADDON: CPT

## 2021-04-18 PROCEDURE — 99900035 HC TECH TIME PER 15 MIN (STAT)

## 2021-04-18 PROCEDURE — 96376 TX/PRO/DX INJ SAME DRUG ADON: CPT

## 2021-04-18 PROCEDURE — 96361 HYDRATE IV INFUSION ADD-ON: CPT

## 2021-04-18 PROCEDURE — S4991 NICOTINE PATCH NONLEGEND: HCPCS | Performed by: INTERNAL MEDICINE

## 2021-04-18 PROCEDURE — 63600175 PHARM REV CODE 636 W HCPCS: Performed by: INTERNAL MEDICINE

## 2021-04-18 PROCEDURE — 25000003 PHARM REV CODE 250: Performed by: INTERNAL MEDICINE

## 2021-04-18 PROCEDURE — 83735 ASSAY OF MAGNESIUM: CPT | Performed by: INTERNAL MEDICINE

## 2021-04-18 PROCEDURE — G0378 HOSPITAL OBSERVATION PER HR: HCPCS

## 2021-04-18 PROCEDURE — 80053 COMPREHEN METABOLIC PANEL: CPT | Performed by: INTERNAL MEDICINE

## 2021-04-18 PROCEDURE — 99226 PR SUBSEQUENT OBSERVATION CARE,LEVEL III: CPT | Mod: ,,, | Performed by: INTERNAL MEDICINE

## 2021-04-18 RX ORDER — POTASSIUM CHLORIDE 7.45 MG/ML
10 INJECTION INTRAVENOUS
Status: COMPLETED | OUTPATIENT
Start: 2021-04-18 | End: 2021-04-18

## 2021-04-18 RX ORDER — SODIUM CHLORIDE 9 MG/ML
INJECTION, SOLUTION INTRAVENOUS
Status: DISCONTINUED | OUTPATIENT
Start: 2021-04-18 | End: 2021-04-20

## 2021-04-18 RX ORDER — MAGNESIUM SULFATE HEPTAHYDRATE 40 MG/ML
2 INJECTION, SOLUTION INTRAVENOUS ONCE
Status: COMPLETED | OUTPATIENT
Start: 2021-04-18 | End: 2021-04-18

## 2021-04-18 RX ADMIN — SODIUM CHLORIDE, SODIUM LACTATE, POTASSIUM CHLORIDE, AND CALCIUM CHLORIDE: .6; .31; .03; .02 INJECTION, SOLUTION INTRAVENOUS at 12:04

## 2021-04-18 RX ADMIN — Medication 15 ML: at 09:04

## 2021-04-18 RX ADMIN — GABAPENTIN 600 MG: 300 CAPSULE ORAL at 09:04

## 2021-04-18 RX ADMIN — METOCLOPRAMIDE 10 MG: 5 INJECTION, SOLUTION INTRAMUSCULAR; INTRAVENOUS at 11:04

## 2021-04-18 RX ADMIN — LORAZEPAM 1 MG: 2 INJECTION INTRAMUSCULAR; INTRAVENOUS at 06:04

## 2021-04-18 RX ADMIN — SODIUM CHLORIDE, SODIUM LACTATE, POTASSIUM CHLORIDE, AND CALCIUM CHLORIDE: .6; .31; .03; .02 INJECTION, SOLUTION INTRAVENOUS at 02:04

## 2021-04-18 RX ADMIN — METOCLOPRAMIDE 10 MG: 5 INJECTION, SOLUTION INTRAMUSCULAR; INTRAVENOUS at 06:04

## 2021-04-18 RX ADMIN — FOLIC ACID 1 MG: 5 INJECTION, SOLUTION INTRAMUSCULAR; INTRAVENOUS; SUBCUTANEOUS at 09:04

## 2021-04-18 RX ADMIN — Medication 1 PATCH: at 09:04

## 2021-04-18 RX ADMIN — POTASSIUM CHLORIDE 10 MEQ: 7.46 INJECTION, SOLUTION INTRAVENOUS at 01:04

## 2021-04-18 RX ADMIN — GABAPENTIN 600 MG: 300 CAPSULE ORAL at 02:04

## 2021-04-18 RX ADMIN — HYDROXYZINE PAMOATE 50 MG: 25 CAPSULE ORAL at 08:04

## 2021-04-18 RX ADMIN — LEVOTHYROXINE SODIUM 25 MCG: 25 TABLET ORAL at 05:04

## 2021-04-18 RX ADMIN — PROMETHAZINE HYDROCHLORIDE 12.5 MG: 25 INJECTION INTRAMUSCULAR; INTRAVENOUS at 02:04

## 2021-04-18 RX ADMIN — THIAMINE HYDROCHLORIDE 100 MG: 100 INJECTION, SOLUTION INTRAMUSCULAR; INTRAVENOUS at 11:04

## 2021-04-18 RX ADMIN — PROMETHAZINE HYDROCHLORIDE 12.5 MG: 25 INJECTION INTRAMUSCULAR; INTRAVENOUS at 09:04

## 2021-04-18 RX ADMIN — ARIPIPRAZOLE 5 MG: 5 TABLET ORAL at 08:04

## 2021-04-18 RX ADMIN — DIAZEPAM 5 MG: 5 INJECTION, SOLUTION INTRAMUSCULAR; INTRAVENOUS at 09:04

## 2021-04-18 RX ADMIN — TIOTROPIUM BROMIDE INHALATION SPRAY 2 PUFF: 3.12 SPRAY, METERED RESPIRATORY (INHALATION) at 07:04

## 2021-04-18 RX ADMIN — ARIPIPRAZOLE 5 MG: 5 TABLET ORAL at 09:04

## 2021-04-18 RX ADMIN — ONDANSETRON 4 MG: 2 INJECTION INTRAMUSCULAR; INTRAVENOUS at 06:04

## 2021-04-18 RX ADMIN — ONDANSETRON 4 MG: 2 INJECTION INTRAMUSCULAR; INTRAVENOUS at 12:04

## 2021-04-18 RX ADMIN — LORAZEPAM 1 MG: 2 INJECTION INTRAMUSCULAR; INTRAVENOUS at 11:04

## 2021-04-18 RX ADMIN — GABAPENTIN 600 MG: 300 CAPSULE ORAL at 08:04

## 2021-04-18 RX ADMIN — ACETAMINOPHEN 650 MG: 325 TABLET, FILM COATED ORAL at 09:04

## 2021-04-18 RX ADMIN — DIAZEPAM 5 MG: 5 INJECTION, SOLUTION INTRAMUSCULAR; INTRAVENOUS at 01:04

## 2021-04-18 RX ADMIN — MAGNESIUM SULFATE 2 G: 2 INJECTION INTRAVENOUS at 03:04

## 2021-04-18 RX ADMIN — POTASSIUM CHLORIDE 10 MEQ: 7.46 INJECTION, SOLUTION INTRAVENOUS at 12:04

## 2021-04-18 RX ADMIN — METOCLOPRAMIDE 10 MG: 5 INJECTION, SOLUTION INTRAMUSCULAR; INTRAVENOUS at 05:04

## 2021-04-18 RX ADMIN — PANTOPRAZOLE SODIUM 40 MG: 40 INJECTION, POWDER, FOR SOLUTION INTRAVENOUS at 09:04

## 2021-04-18 RX ADMIN — HYDROXYZINE PAMOATE 50 MG: 25 CAPSULE ORAL at 02:04

## 2021-04-18 RX ADMIN — DIAZEPAM 5 MG: 5 INJECTION, SOLUTION INTRAMUSCULAR; INTRAVENOUS at 05:04

## 2021-04-19 ENCOUNTER — ANESTHESIA EVENT (OUTPATIENT)
Dept: ENDOSCOPY | Facility: OTHER | Age: 61
DRG: 897 | End: 2021-04-19
Payer: COMMERCIAL

## 2021-04-19 ENCOUNTER — ANESTHESIA (OUTPATIENT)
Dept: ENDOSCOPY | Facility: OTHER | Age: 61
DRG: 897 | End: 2021-04-19
Payer: COMMERCIAL

## 2021-04-19 PROBLEM — K29.20 ALCOHOLIC GASTRITIS WITHOUT BLEEDING: Status: RESOLVED | Noted: 2021-04-19 | Resolved: 2021-04-19

## 2021-04-19 PROBLEM — K29.20 ALCOHOLIC GASTRITIS WITHOUT BLEEDING: Status: ACTIVE | Noted: 2021-04-19

## 2021-04-19 LAB
ALBUMIN SERPL BCP-MCNC: 3.4 G/DL (ref 3.5–5.2)
ALP SERPL-CCNC: 44 U/L (ref 55–135)
ALT SERPL W/O P-5'-P-CCNC: 12 U/L (ref 10–44)
ANION GAP SERPL CALC-SCNC: 11 MMOL/L (ref 8–16)
AST SERPL-CCNC: 31 U/L (ref 10–40)
BASOPHILS # BLD AUTO: 0.01 K/UL (ref 0–0.2)
BASOPHILS NFR BLD: 0.2 % (ref 0–1.9)
BILIRUB SERPL-MCNC: 0.8 MG/DL (ref 0.1–1)
BUN SERPL-MCNC: 9 MG/DL (ref 8–23)
CALCIUM SERPL-MCNC: 8.4 MG/DL (ref 8.7–10.5)
CHLORIDE SERPL-SCNC: 99 MMOL/L (ref 95–110)
CO2 SERPL-SCNC: 28 MMOL/L (ref 23–29)
CREAT SERPL-MCNC: 0.8 MG/DL (ref 0.5–1.4)
DIFFERENTIAL METHOD: ABNORMAL
EOSINOPHIL # BLD AUTO: 0 K/UL (ref 0–0.5)
EOSINOPHIL NFR BLD: 0.5 % (ref 0–8)
ERYTHROCYTE [DISTWIDTH] IN BLOOD BY AUTOMATED COUNT: 16.8 % (ref 11.5–14.5)
EST. GFR  (AFRICAN AMERICAN): >60 ML/MIN/1.73 M^2
EST. GFR  (NON AFRICAN AMERICAN): >60 ML/MIN/1.73 M^2
GLUCOSE SERPL-MCNC: 89 MG/DL (ref 70–110)
HCT VFR BLD AUTO: 39.9 % (ref 37–48.5)
HGB BLD-MCNC: 12.3 G/DL (ref 12–16)
IMM GRANULOCYTES # BLD AUTO: 0.02 K/UL (ref 0–0.04)
IMM GRANULOCYTES NFR BLD AUTO: 0.3 % (ref 0–0.5)
LYMPHOCYTES # BLD AUTO: 3 K/UL (ref 1–4.8)
LYMPHOCYTES NFR BLD: 44.3 % (ref 18–48)
MAGNESIUM SERPL-MCNC: 1.9 MG/DL (ref 1.6–2.6)
MCH RBC QN AUTO: 26.5 PG (ref 27–31)
MCHC RBC AUTO-ENTMCNC: 30.8 G/DL (ref 32–36)
MCV RBC AUTO: 86 FL (ref 82–98)
MONOCYTES # BLD AUTO: 0.8 K/UL (ref 0.3–1)
MONOCYTES NFR BLD: 11.6 % (ref 4–15)
NEUTROPHILS # BLD AUTO: 2.9 K/UL (ref 1.8–7.7)
NEUTROPHILS NFR BLD: 43.1 % (ref 38–73)
NRBC BLD-RTO: 0 /100 WBC
PHOSPHATE SERPL-MCNC: 2.1 MG/DL (ref 2.7–4.5)
PLATELET # BLD AUTO: 98 K/UL (ref 150–450)
PMV BLD AUTO: 10.4 FL (ref 9.2–12.9)
POTASSIUM SERPL-SCNC: 3.2 MMOL/L (ref 3.5–5.1)
PROT SERPL-MCNC: 6.1 G/DL (ref 6–8.4)
RBC # BLD AUTO: 4.64 M/UL (ref 4–5.4)
SODIUM SERPL-SCNC: 138 MMOL/L (ref 136–145)
WBC # BLD AUTO: 6.66 K/UL (ref 3.9–12.7)

## 2021-04-19 PROCEDURE — 96375 TX/PRO/DX INJ NEW DRUG ADDON: CPT

## 2021-04-19 PROCEDURE — 88305 TISSUE EXAM BY PATHOLOGIST: CPT | Performed by: PATHOLOGY

## 2021-04-19 PROCEDURE — 83735 ASSAY OF MAGNESIUM: CPT | Performed by: INTERNAL MEDICINE

## 2021-04-19 PROCEDURE — 84100 ASSAY OF PHOSPHORUS: CPT | Performed by: INTERNAL MEDICINE

## 2021-04-19 PROCEDURE — G0378 HOSPITAL OBSERVATION PER HR: HCPCS

## 2021-04-19 PROCEDURE — 25000003 PHARM REV CODE 250: Performed by: ANESTHESIOLOGY

## 2021-04-19 PROCEDURE — 93010 EKG 12-LEAD: ICD-10-PCS | Mod: ,,, | Performed by: INTERNAL MEDICINE

## 2021-04-19 PROCEDURE — 99900035 HC TECH TIME PER 15 MIN (STAT)

## 2021-04-19 PROCEDURE — 36415 COLL VENOUS BLD VENIPUNCTURE: CPT | Performed by: INTERNAL MEDICINE

## 2021-04-19 PROCEDURE — 93010 ELECTROCARDIOGRAM REPORT: CPT | Mod: ,,, | Performed by: INTERNAL MEDICINE

## 2021-04-19 PROCEDURE — 88305 TISSUE EXAM BY PATHOLOGIST: CPT | Mod: 26,,, | Performed by: PATHOLOGY

## 2021-04-19 PROCEDURE — S4991 NICOTINE PATCH NONLEGEND: HCPCS | Performed by: INTERNAL MEDICINE

## 2021-04-19 PROCEDURE — 25000003 PHARM REV CODE 250: Performed by: INTERNAL MEDICINE

## 2021-04-19 PROCEDURE — 93005 ELECTROCARDIOGRAM TRACING: CPT

## 2021-04-19 PROCEDURE — 43239 EGD BIOPSY SINGLE/MULTIPLE: CPT | Performed by: INTERNAL MEDICINE

## 2021-04-19 PROCEDURE — 36410 VNPNXR 3YR/> PHY/QHP DX/THER: CPT

## 2021-04-19 PROCEDURE — 76937 US GUIDE VASCULAR ACCESS: CPT

## 2021-04-19 PROCEDURE — 63600175 PHARM REV CODE 636 W HCPCS: Performed by: NURSE ANESTHETIST, CERTIFIED REGISTERED

## 2021-04-19 PROCEDURE — 25000003 PHARM REV CODE 250: Performed by: NURSE ANESTHETIST, CERTIFIED REGISTERED

## 2021-04-19 PROCEDURE — 63600175 PHARM REV CODE 636 W HCPCS: Performed by: ANESTHESIOLOGY

## 2021-04-19 PROCEDURE — 99226 PR SUBSEQUENT OBSERVATION CARE,LEVEL III: CPT | Mod: ,,, | Performed by: INTERNAL MEDICINE

## 2021-04-19 PROCEDURE — 99226 PR SUBSEQUENT OBSERVATION CARE,LEVEL III: ICD-10-PCS | Mod: ,,, | Performed by: INTERNAL MEDICINE

## 2021-04-19 PROCEDURE — 37000009 HC ANESTHESIA EA ADD 15 MINS: Performed by: INTERNAL MEDICINE

## 2021-04-19 PROCEDURE — 63600175 PHARM REV CODE 636 W HCPCS: Performed by: INTERNAL MEDICINE

## 2021-04-19 PROCEDURE — 85025 COMPLETE CBC W/AUTO DIFF WBC: CPT | Performed by: INTERNAL MEDICINE

## 2021-04-19 PROCEDURE — 80053 COMPREHEN METABOLIC PANEL: CPT | Performed by: INTERNAL MEDICINE

## 2021-04-19 PROCEDURE — 96376 TX/PRO/DX INJ SAME DRUG ADON: CPT

## 2021-04-19 PROCEDURE — 37000008 HC ANESTHESIA 1ST 15 MINUTES: Performed by: INTERNAL MEDICINE

## 2021-04-19 PROCEDURE — C1751 CATH, INF, PER/CENT/MIDLINE: HCPCS

## 2021-04-19 PROCEDURE — 88305 TISSUE EXAM BY PATHOLOGIST: ICD-10-PCS | Mod: 26,,, | Performed by: PATHOLOGY

## 2021-04-19 RX ORDER — OXYCODONE HYDROCHLORIDE 5 MG/1
5 TABLET ORAL
Status: DISCONTINUED | OUTPATIENT
Start: 2021-04-19 | End: 2021-04-20

## 2021-04-19 RX ORDER — PANTOPRAZOLE SODIUM 40 MG/1
40 TABLET, DELAYED RELEASE ORAL DAILY
Status: DISCONTINUED | OUTPATIENT
Start: 2021-04-19 | End: 2021-04-23 | Stop reason: HOSPADM

## 2021-04-19 RX ORDER — PROPOFOL 10 MG/ML
VIAL (ML) INTRAVENOUS
Status: DISCONTINUED | OUTPATIENT
Start: 2021-04-19 | End: 2021-04-19

## 2021-04-19 RX ORDER — POTASSIUM CHLORIDE 7.45 MG/ML
10 INJECTION INTRAVENOUS
Status: COMPLETED | OUTPATIENT
Start: 2021-04-19 | End: 2021-04-19

## 2021-04-19 RX ORDER — ONDANSETRON 2 MG/ML
4 INJECTION INTRAMUSCULAR; INTRAVENOUS DAILY PRN
Status: COMPLETED | OUTPATIENT
Start: 2021-04-19 | End: 2021-04-21

## 2021-04-19 RX ORDER — DIPHENHYDRAMINE HYDROCHLORIDE 50 MG/ML
25 INJECTION INTRAMUSCULAR; INTRAVENOUS EVERY 6 HOURS PRN
Status: DISCONTINUED | OUTPATIENT
Start: 2021-04-19 | End: 2021-04-20

## 2021-04-19 RX ORDER — SODIUM CHLORIDE 0.9 % (FLUSH) 0.9 %
3 SYRINGE (ML) INJECTION
Status: DISCONTINUED | OUTPATIENT
Start: 2021-04-19 | End: 2021-04-20

## 2021-04-19 RX ORDER — HYDROMORPHONE HYDROCHLORIDE 2 MG/ML
0.4 INJECTION, SOLUTION INTRAMUSCULAR; INTRAVENOUS; SUBCUTANEOUS EVERY 5 MIN PRN
Status: DISCONTINUED | OUTPATIENT
Start: 2021-04-19 | End: 2021-04-20

## 2021-04-19 RX ORDER — POTASSIUM CHLORIDE 20 MEQ/1
20 TABLET, EXTENDED RELEASE ORAL ONCE
Status: COMPLETED | OUTPATIENT
Start: 2021-04-19 | End: 2021-04-19

## 2021-04-19 RX ORDER — LIDOCAINE HCL/PF 100 MG/5ML
SYRINGE (ML) INTRAVENOUS
Status: DISCONTINUED | OUTPATIENT
Start: 2021-04-19 | End: 2021-04-19

## 2021-04-19 RX ORDER — DIAZEPAM 10 MG/2ML
5 INJECTION INTRAMUSCULAR EVERY 12 HOURS
Status: DISCONTINUED | OUTPATIENT
Start: 2021-04-19 | End: 2021-04-20

## 2021-04-19 RX ADMIN — POTASSIUM CHLORIDE 10 MEQ: 7.46 INJECTION, SOLUTION INTRAVENOUS at 12:04

## 2021-04-19 RX ADMIN — GABAPENTIN 600 MG: 300 CAPSULE ORAL at 05:04

## 2021-04-19 RX ADMIN — POTASSIUM CHLORIDE 10 MEQ: 7.46 INJECTION, SOLUTION INTRAVENOUS at 10:04

## 2021-04-19 RX ADMIN — METOCLOPRAMIDE 10 MG: 5 INJECTION, SOLUTION INTRAMUSCULAR; INTRAVENOUS at 05:04

## 2021-04-19 RX ADMIN — Medication 1 PATCH: at 10:04

## 2021-04-19 RX ADMIN — DIAZEPAM 5 MG: 5 INJECTION, SOLUTION INTRAMUSCULAR; INTRAVENOUS at 05:04

## 2021-04-19 RX ADMIN — POTASSIUM CHLORIDE 20 MEQ: 1500 TABLET, EXTENDED RELEASE ORAL at 10:04

## 2021-04-19 RX ADMIN — PROPOFOL 70 MG: 10 INJECTION, EMULSION INTRAVENOUS at 08:04

## 2021-04-19 RX ADMIN — METOCLOPRAMIDE 10 MG: 5 INJECTION, SOLUTION INTRAMUSCULAR; INTRAVENOUS at 12:04

## 2021-04-19 RX ADMIN — SODIUM CHLORIDE, SODIUM LACTATE, POTASSIUM CHLORIDE, AND CALCIUM CHLORIDE: .6; .31; .03; .02 INJECTION, SOLUTION INTRAVENOUS at 12:04

## 2021-04-19 RX ADMIN — ARIPIPRAZOLE 5 MG: 5 TABLET ORAL at 09:04

## 2021-04-19 RX ADMIN — HYDROMORPHONE HYDROCHLORIDE 0.4 MG: 2 INJECTION INTRAMUSCULAR; INTRAVENOUS; SUBCUTANEOUS at 08:04

## 2021-04-19 RX ADMIN — PROPOFOL 100 MG: 10 INJECTION, EMULSION INTRAVENOUS at 08:04

## 2021-04-19 RX ADMIN — GABAPENTIN 600 MG: 300 CAPSULE ORAL at 10:04

## 2021-04-19 RX ADMIN — LORAZEPAM 1 MG: 2 INJECTION INTRAMUSCULAR; INTRAVENOUS at 12:04

## 2021-04-19 RX ADMIN — ARIPIPRAZOLE 5 MG: 5 TABLET ORAL at 10:04

## 2021-04-19 RX ADMIN — LEVOTHYROXINE SODIUM 25 MCG: 25 TABLET ORAL at 05:04

## 2021-04-19 RX ADMIN — OXYCODONE 5 MG: 5 TABLET ORAL at 10:04

## 2021-04-19 RX ADMIN — HYDROXYZINE PAMOATE 50 MG: 25 CAPSULE ORAL at 10:04

## 2021-04-19 RX ADMIN — DIAZEPAM 5 MG: 5 INJECTION, SOLUTION INTRAMUSCULAR; INTRAVENOUS at 09:04

## 2021-04-19 RX ADMIN — HYDRALAZINE HYDROCHLORIDE 10 MG: 20 INJECTION, SOLUTION INTRAMUSCULAR; INTRAVENOUS at 05:04

## 2021-04-19 RX ADMIN — HYDRALAZINE HYDROCHLORIDE 10 MG: 20 INJECTION, SOLUTION INTRAMUSCULAR; INTRAVENOUS at 09:04

## 2021-04-19 RX ADMIN — LABETALOL HYDROCHLORIDE 20 MG: 5 INJECTION INTRAVENOUS at 12:04

## 2021-04-19 RX ADMIN — PROMETHAZINE HYDROCHLORIDE 12.5 MG: 25 INJECTION INTRAMUSCULAR; INTRAVENOUS at 07:04

## 2021-04-19 RX ADMIN — HYDROXYZINE PAMOATE 50 MG: 25 CAPSULE ORAL at 04:04

## 2021-04-19 RX ADMIN — HYDROMORPHONE HYDROCHLORIDE 0.4 MG: 2 INJECTION INTRAMUSCULAR; INTRAVENOUS; SUBCUTANEOUS at 09:04

## 2021-04-19 RX ADMIN — ONDANSETRON 4 MG: 2 INJECTION INTRAMUSCULAR; INTRAVENOUS at 09:04

## 2021-04-19 RX ADMIN — LIDOCAINE HYDROCHLORIDE 100 MG: 20 INJECTION, SOLUTION INTRAVENOUS at 08:04

## 2021-04-19 RX ADMIN — PROMETHAZINE HYDROCHLORIDE 12.5 MG: 25 INJECTION INTRAMUSCULAR; INTRAVENOUS at 10:04

## 2021-04-19 RX ADMIN — GABAPENTIN 600 MG: 300 CAPSULE ORAL at 09:04

## 2021-04-20 PROBLEM — R19.7 VOMITING AND DIARRHEA: Status: RESOLVED | Noted: 2021-04-17 | Resolved: 2021-04-20

## 2021-04-20 PROBLEM — R11.10 VOMITING AND DIARRHEA: Status: RESOLVED | Noted: 2021-04-17 | Resolved: 2021-04-20

## 2021-04-20 PROBLEM — J44.9 COPD (CHRONIC OBSTRUCTIVE PULMONARY DISEASE): Status: RESOLVED | Noted: 2021-04-17 | Resolved: 2021-04-20

## 2021-04-20 PROBLEM — R94.31 PROLONGED QT INTERVAL: Status: RESOLVED | Noted: 2021-04-18 | Resolved: 2021-04-20

## 2021-04-20 PROBLEM — D69.6 THROMBOCYTOPENIA: Status: RESOLVED | Noted: 2019-10-26 | Resolved: 2021-04-20

## 2021-04-20 LAB
ALBUMIN SERPL BCP-MCNC: 3.4 G/DL (ref 3.5–5.2)
ALP SERPL-CCNC: 45 U/L (ref 55–135)
ALT SERPL W/O P-5'-P-CCNC: 22 U/L (ref 10–44)
ANION GAP SERPL CALC-SCNC: 12 MMOL/L (ref 8–16)
AST SERPL-CCNC: 42 U/L (ref 10–40)
BILIRUB SERPL-MCNC: 0.8 MG/DL (ref 0.1–1)
BUN SERPL-MCNC: 11 MG/DL (ref 8–23)
CALCIUM SERPL-MCNC: 8.6 MG/DL (ref 8.7–10.5)
CHLORIDE SERPL-SCNC: 101 MMOL/L (ref 95–110)
CO2 SERPL-SCNC: 26 MMOL/L (ref 23–29)
CREAT SERPL-MCNC: 0.8 MG/DL (ref 0.5–1.4)
EST. GFR  (AFRICAN AMERICAN): >60 ML/MIN/1.73 M^2
EST. GFR  (NON AFRICAN AMERICAN): >60 ML/MIN/1.73 M^2
GLUCOSE SERPL-MCNC: 79 MG/DL (ref 70–110)
MAGNESIUM SERPL-MCNC: 1.8 MG/DL (ref 1.6–2.6)
PHOSPHATE SERPL-MCNC: 3.3 MG/DL (ref 2.7–4.5)
POTASSIUM SERPL-SCNC: 3.2 MMOL/L (ref 3.5–5.1)
PROT SERPL-MCNC: 6.2 G/DL (ref 6–8.4)
SODIUM SERPL-SCNC: 139 MMOL/L (ref 136–145)

## 2021-04-20 PROCEDURE — 99233 PR SUBSEQUENT HOSPITAL CARE,LEVL III: ICD-10-PCS | Mod: ,,, | Performed by: HOSPITALIST

## 2021-04-20 PROCEDURE — 83735 ASSAY OF MAGNESIUM: CPT | Performed by: INTERNAL MEDICINE

## 2021-04-20 PROCEDURE — 96374 THER/PROPH/DIAG INJ IV PUSH: CPT | Mod: 59

## 2021-04-20 PROCEDURE — 99233 SBSQ HOSP IP/OBS HIGH 50: CPT | Mod: ,,, | Performed by: HOSPITALIST

## 2021-04-20 PROCEDURE — 80053 COMPREHEN METABOLIC PANEL: CPT | Performed by: INTERNAL MEDICINE

## 2021-04-20 PROCEDURE — 94640 AIRWAY INHALATION TREATMENT: CPT

## 2021-04-20 PROCEDURE — 36415 COLL VENOUS BLD VENIPUNCTURE: CPT | Performed by: INTERNAL MEDICINE

## 2021-04-20 PROCEDURE — 63600175 PHARM REV CODE 636 W HCPCS: Performed by: HOSPITALIST

## 2021-04-20 PROCEDURE — 84100 ASSAY OF PHOSPHORUS: CPT | Performed by: INTERNAL MEDICINE

## 2021-04-20 PROCEDURE — 63600175 PHARM REV CODE 636 W HCPCS: Performed by: INTERNAL MEDICINE

## 2021-04-20 PROCEDURE — 25000003 PHARM REV CODE 250: Performed by: HOSPITALIST

## 2021-04-20 PROCEDURE — S4991 NICOTINE PATCH NONLEGEND: HCPCS | Performed by: INTERNAL MEDICINE

## 2021-04-20 PROCEDURE — 94761 N-INVAS EAR/PLS OXIMETRY MLT: CPT

## 2021-04-20 PROCEDURE — 25000003 PHARM REV CODE 250: Performed by: INTERNAL MEDICINE

## 2021-04-20 PROCEDURE — 96376 TX/PRO/DX INJ SAME DRUG ADON: CPT

## 2021-04-20 PROCEDURE — 21400001 HC TELEMETRY ROOM

## 2021-04-20 RX ORDER — DIAZEPAM 10 MG/2ML
5 INJECTION INTRAMUSCULAR ONCE
Status: DISCONTINUED | OUTPATIENT
Start: 2021-04-20 | End: 2021-04-20

## 2021-04-20 RX ORDER — LOSARTAN POTASSIUM 50 MG/1
50 TABLET ORAL DAILY
Status: DISCONTINUED | OUTPATIENT
Start: 2021-04-20 | End: 2021-04-23 | Stop reason: HOSPADM

## 2021-04-20 RX ORDER — DIAZEPAM 10 MG/2ML
5 INJECTION INTRAMUSCULAR EVERY 8 HOURS
Status: DISCONTINUED | OUTPATIENT
Start: 2021-04-20 | End: 2021-04-20

## 2021-04-20 RX ORDER — NIFEDIPINE 30 MG/1
30 TABLET, EXTENDED RELEASE ORAL DAILY
Status: DISCONTINUED | OUTPATIENT
Start: 2021-04-20 | End: 2021-04-23 | Stop reason: HOSPADM

## 2021-04-20 RX ORDER — ACETAMINOPHEN 500 MG
1000 TABLET ORAL EVERY 8 HOURS PRN
Status: DISCONTINUED | OUTPATIENT
Start: 2021-04-20 | End: 2021-04-23 | Stop reason: HOSPADM

## 2021-04-20 RX ORDER — OXYCODONE HYDROCHLORIDE 5 MG/1
5 TABLET ORAL EVERY 6 HOURS PRN
Status: DISCONTINUED | OUTPATIENT
Start: 2021-04-20 | End: 2021-04-23 | Stop reason: HOSPADM

## 2021-04-20 RX ORDER — DIAZEPAM 10 MG/2ML
5 INJECTION INTRAMUSCULAR EVERY 6 HOURS PRN
Status: DISCONTINUED | OUTPATIENT
Start: 2021-04-20 | End: 2021-04-23 | Stop reason: HOSPADM

## 2021-04-20 RX ORDER — GABAPENTIN 400 MG/1
400 CAPSULE ORAL 3 TIMES DAILY
Status: DISCONTINUED | OUTPATIENT
Start: 2021-04-20 | End: 2021-04-23 | Stop reason: HOSPADM

## 2021-04-20 RX ORDER — DIAZEPAM 5 MG/1
10 TABLET ORAL 3 TIMES DAILY
Status: DISCONTINUED | OUTPATIENT
Start: 2021-04-20 | End: 2021-04-21

## 2021-04-20 RX ORDER — HYDRALAZINE HYDROCHLORIDE 20 MG/ML
10 INJECTION INTRAMUSCULAR; INTRAVENOUS ONCE
Status: COMPLETED | OUTPATIENT
Start: 2021-04-20 | End: 2021-04-20

## 2021-04-20 RX ADMIN — HYDRALAZINE HYDROCHLORIDE 10 MG: 20 INJECTION, SOLUTION INTRAMUSCULAR; INTRAVENOUS at 02:04

## 2021-04-20 RX ADMIN — METOCLOPRAMIDE 10 MG: 5 INJECTION, SOLUTION INTRAMUSCULAR; INTRAVENOUS at 01:04

## 2021-04-20 RX ADMIN — GABAPENTIN 400 MG: 400 CAPSULE ORAL at 02:04

## 2021-04-20 RX ADMIN — ARIPIPRAZOLE 5 MG: 5 TABLET ORAL at 09:04

## 2021-04-20 RX ADMIN — LEVOTHYROXINE SODIUM 25 MCG: 25 TABLET ORAL at 06:04

## 2021-04-20 RX ADMIN — LOSARTAN POTASSIUM 50 MG: 50 TABLET, FILM COATED ORAL at 04:04

## 2021-04-20 RX ADMIN — HYDRALAZINE HYDROCHLORIDE 10 MG: 20 INJECTION, SOLUTION INTRAMUSCULAR; INTRAVENOUS at 09:04

## 2021-04-20 RX ADMIN — NIFEDIPINE 30 MG: 30 TABLET, FILM COATED, EXTENDED RELEASE ORAL at 04:04

## 2021-04-20 RX ADMIN — LORAZEPAM 1 MG: 2 INJECTION INTRAMUSCULAR; INTRAVENOUS at 11:04

## 2021-04-20 RX ADMIN — TIOTROPIUM BROMIDE INHALATION SPRAY 2 PUFF: 3.12 SPRAY, METERED RESPIRATORY (INHALATION) at 08:04

## 2021-04-20 RX ADMIN — THIAMINE HYDROCHLORIDE 100 MG: 100 INJECTION, SOLUTION INTRAMUSCULAR; INTRAVENOUS at 09:04

## 2021-04-20 RX ADMIN — DIAZEPAM 5 MG: 5 INJECTION, SOLUTION INTRAMUSCULAR; INTRAVENOUS at 09:04

## 2021-04-20 RX ADMIN — GABAPENTIN 400 MG: 400 CAPSULE ORAL at 09:04

## 2021-04-20 RX ADMIN — ACETAMINOPHEN 650 MG: 325 TABLET, FILM COATED ORAL at 11:04

## 2021-04-20 RX ADMIN — DIAZEPAM 10 MG: 5 TABLET ORAL at 09:04

## 2021-04-20 RX ADMIN — PROMETHAZINE HYDROCHLORIDE 12.5 MG: 25 INJECTION INTRAMUSCULAR; INTRAVENOUS at 09:04

## 2021-04-20 RX ADMIN — GABAPENTIN 600 MG: 300 CAPSULE ORAL at 09:04

## 2021-04-20 RX ADMIN — LORAZEPAM 1 MG: 2 INJECTION INTRAMUSCULAR; INTRAVENOUS at 01:04

## 2021-04-20 RX ADMIN — METOCLOPRAMIDE 10 MG: 5 INJECTION, SOLUTION INTRAMUSCULAR; INTRAVENOUS at 06:04

## 2021-04-20 RX ADMIN — METOCLOPRAMIDE 10 MG: 5 INJECTION, SOLUTION INTRAMUSCULAR; INTRAVENOUS at 11:04

## 2021-04-20 RX ADMIN — Medication 15 ML: at 09:04

## 2021-04-20 RX ADMIN — Medication 1 PATCH: at 09:04

## 2021-04-20 RX ADMIN — PANTOPRAZOLE SODIUM 40 MG: 40 TABLET, DELAYED RELEASE ORAL at 09:04

## 2021-04-20 RX ADMIN — DIAZEPAM 10 MG: 5 TABLET ORAL at 04:04

## 2021-04-20 RX ADMIN — POTASSIUM CHLORIDE: 2 INJECTION, SOLUTION, CONCENTRATE INTRAVENOUS at 03:04

## 2021-04-20 RX ADMIN — DIAZEPAM 5 MG: 5 INJECTION, SOLUTION INTRAMUSCULAR; INTRAVENOUS at 02:04

## 2021-04-21 LAB
ALBUMIN SERPL BCP-MCNC: 3.1 G/DL (ref 3.5–5.2)
ALP SERPL-CCNC: 43 U/L (ref 55–135)
ALT SERPL W/O P-5'-P-CCNC: 26 U/L (ref 10–44)
ANION GAP SERPL CALC-SCNC: 8 MMOL/L (ref 8–16)
AST SERPL-CCNC: 35 U/L (ref 10–40)
BASOPHILS # BLD AUTO: 0.02 K/UL (ref 0–0.2)
BASOPHILS NFR BLD: 0.3 % (ref 0–1.9)
BILIRUB SERPL-MCNC: 0.6 MG/DL (ref 0.1–1)
BUN SERPL-MCNC: 10 MG/DL (ref 8–23)
CALCIUM SERPL-MCNC: 8.2 MG/DL (ref 8.7–10.5)
CHLORIDE SERPL-SCNC: 104 MMOL/L (ref 95–110)
CO2 SERPL-SCNC: 25 MMOL/L (ref 23–29)
CREAT SERPL-MCNC: 0.7 MG/DL (ref 0.5–1.4)
DIFFERENTIAL METHOD: ABNORMAL
EOSINOPHIL # BLD AUTO: 0.1 K/UL (ref 0–0.5)
EOSINOPHIL NFR BLD: 1.2 % (ref 0–8)
ERYTHROCYTE [DISTWIDTH] IN BLOOD BY AUTOMATED COUNT: 17.1 % (ref 11.5–14.5)
EST. GFR  (AFRICAN AMERICAN): >60 ML/MIN/1.73 M^2
EST. GFR  (NON AFRICAN AMERICAN): >60 ML/MIN/1.73 M^2
GLUCOSE SERPL-MCNC: 88 MG/DL (ref 70–110)
HCT VFR BLD AUTO: 36.5 % (ref 37–48.5)
HGB BLD-MCNC: 11.4 G/DL (ref 12–16)
IMM GRANULOCYTES # BLD AUTO: 0.03 K/UL (ref 0–0.04)
IMM GRANULOCYTES NFR BLD AUTO: 0.4 % (ref 0–0.5)
LYMPHOCYTES # BLD AUTO: 3.3 K/UL (ref 1–4.8)
LYMPHOCYTES NFR BLD: 42.7 % (ref 18–48)
MAGNESIUM SERPL-MCNC: 1.6 MG/DL (ref 1.6–2.6)
MCH RBC QN AUTO: 26.9 PG (ref 27–31)
MCHC RBC AUTO-ENTMCNC: 31.2 G/DL (ref 32–36)
MCV RBC AUTO: 86 FL (ref 82–98)
MONOCYTES # BLD AUTO: 1 K/UL (ref 0.3–1)
MONOCYTES NFR BLD: 12.6 % (ref 4–15)
NEUTROPHILS # BLD AUTO: 3.3 K/UL (ref 1.8–7.7)
NEUTROPHILS NFR BLD: 42.8 % (ref 38–73)
NRBC BLD-RTO: 0 /100 WBC
PHOSPHATE SERPL-MCNC: 3.6 MG/DL (ref 2.7–4.5)
PLATELET # BLD AUTO: 97 K/UL (ref 150–450)
PMV BLD AUTO: 9.9 FL (ref 9.2–12.9)
POTASSIUM SERPL-SCNC: 3.4 MMOL/L (ref 3.5–5.1)
PROT SERPL-MCNC: 5.6 G/DL (ref 6–8.4)
RBC # BLD AUTO: 4.24 M/UL (ref 4–5.4)
SODIUM SERPL-SCNC: 137 MMOL/L (ref 136–145)
WBC # BLD AUTO: 7.71 K/UL (ref 3.9–12.7)

## 2021-04-21 PROCEDURE — 80053 COMPREHEN METABOLIC PANEL: CPT | Performed by: HOSPITALIST

## 2021-04-21 PROCEDURE — 84100 ASSAY OF PHOSPHORUS: CPT | Performed by: HOSPITALIST

## 2021-04-21 PROCEDURE — 99233 SBSQ HOSP IP/OBS HIGH 50: CPT | Mod: ,,, | Performed by: HOSPITALIST

## 2021-04-21 PROCEDURE — 36415 COLL VENOUS BLD VENIPUNCTURE: CPT | Performed by: HOSPITALIST

## 2021-04-21 PROCEDURE — 25000003 PHARM REV CODE 250: Performed by: HOSPITALIST

## 2021-04-21 PROCEDURE — 25000242 PHARM REV CODE 250 ALT 637 W/ HCPCS: Performed by: INTERNAL MEDICINE

## 2021-04-21 PROCEDURE — 63600175 PHARM REV CODE 636 W HCPCS: Performed by: INTERNAL MEDICINE

## 2021-04-21 PROCEDURE — 25000003 PHARM REV CODE 250: Performed by: INTERNAL MEDICINE

## 2021-04-21 PROCEDURE — 21400001 HC TELEMETRY ROOM

## 2021-04-21 PROCEDURE — 99233 PR SUBSEQUENT HOSPITAL CARE,LEVL III: ICD-10-PCS | Mod: ,,, | Performed by: HOSPITALIST

## 2021-04-21 PROCEDURE — 63600175 PHARM REV CODE 636 W HCPCS: Performed by: ANESTHESIOLOGY

## 2021-04-21 PROCEDURE — 63600175 PHARM REV CODE 636 W HCPCS: Performed by: HOSPITALIST

## 2021-04-21 PROCEDURE — 94761 N-INVAS EAR/PLS OXIMETRY MLT: CPT

## 2021-04-21 PROCEDURE — 83735 ASSAY OF MAGNESIUM: CPT | Performed by: HOSPITALIST

## 2021-04-21 PROCEDURE — 85025 COMPLETE CBC W/AUTO DIFF WBC: CPT | Performed by: HOSPITALIST

## 2021-04-21 PROCEDURE — S4991 NICOTINE PATCH NONLEGEND: HCPCS | Performed by: INTERNAL MEDICINE

## 2021-04-21 PROCEDURE — 94640 AIRWAY INHALATION TREATMENT: CPT

## 2021-04-21 RX ORDER — HYDROMORPHONE HYDROCHLORIDE 1 MG/ML
0.5 INJECTION, SOLUTION INTRAMUSCULAR; INTRAVENOUS; SUBCUTANEOUS ONCE
Status: COMPLETED | OUTPATIENT
Start: 2021-04-21 | End: 2021-04-21

## 2021-04-21 RX ORDER — HYDROMORPHONE HYDROCHLORIDE 1 MG/ML
0.5 INJECTION, SOLUTION INTRAMUSCULAR; INTRAVENOUS; SUBCUTANEOUS EVERY 4 HOURS PRN
Status: DISCONTINUED | OUTPATIENT
Start: 2021-04-21 | End: 2021-04-23 | Stop reason: HOSPADM

## 2021-04-21 RX ORDER — DIAZEPAM 5 MG/1
15 TABLET ORAL 3 TIMES DAILY
Status: DISCONTINUED | OUTPATIENT
Start: 2021-04-21 | End: 2021-04-22

## 2021-04-21 RX ORDER — CLONIDINE HYDROCHLORIDE 0.1 MG/1
0.1 TABLET ORAL 3 TIMES DAILY
Status: DISCONTINUED | OUTPATIENT
Start: 2021-04-21 | End: 2021-04-23 | Stop reason: HOSPADM

## 2021-04-21 RX ADMIN — DIAZEPAM 15 MG: 5 TABLET ORAL at 02:04

## 2021-04-21 RX ADMIN — METOCLOPRAMIDE 10 MG: 5 INJECTION, SOLUTION INTRAMUSCULAR; INTRAVENOUS at 06:04

## 2021-04-21 RX ADMIN — OXYCODONE 5 MG: 5 TABLET ORAL at 02:04

## 2021-04-21 RX ADMIN — CLONIDINE HYDROCHLORIDE 0.1 MG: 0.1 TABLET ORAL at 12:04

## 2021-04-21 RX ADMIN — LOSARTAN POTASSIUM 50 MG: 50 TABLET, FILM COATED ORAL at 09:04

## 2021-04-21 RX ADMIN — HYDROMORPHONE HYDROCHLORIDE 0.5 MG: 1 INJECTION, SOLUTION INTRAMUSCULAR; INTRAVENOUS; SUBCUTANEOUS at 11:04

## 2021-04-21 RX ADMIN — POTASSIUM CHLORIDE: 2 INJECTION, SOLUTION, CONCENTRATE INTRAVENOUS at 12:04

## 2021-04-21 RX ADMIN — ARIPIPRAZOLE 5 MG: 5 TABLET ORAL at 09:04

## 2021-04-21 RX ADMIN — ONDANSETRON 4 MG: 2 INJECTION INTRAMUSCULAR; INTRAVENOUS at 07:04

## 2021-04-21 RX ADMIN — DIAZEPAM 10 MG: 5 TABLET ORAL at 09:04

## 2021-04-21 RX ADMIN — TIOTROPIUM BROMIDE INHALATION SPRAY 2 PUFF: 3.12 SPRAY, METERED RESPIRATORY (INHALATION) at 07:04

## 2021-04-21 RX ADMIN — DIAZEPAM 15 MG: 5 TABLET ORAL at 10:04

## 2021-04-21 RX ADMIN — GABAPENTIN 400 MG: 400 CAPSULE ORAL at 09:04

## 2021-04-21 RX ADMIN — POTASSIUM CHLORIDE: 2 INJECTION, SOLUTION, CONCENTRATE INTRAVENOUS at 09:04

## 2021-04-21 RX ADMIN — PROMETHAZINE HYDROCHLORIDE 12.5 MG: 25 INJECTION INTRAMUSCULAR; INTRAVENOUS at 12:04

## 2021-04-21 RX ADMIN — Medication 1 PATCH: at 08:04

## 2021-04-21 RX ADMIN — FOLIC ACID: 5 INJECTION, SOLUTION INTRAMUSCULAR; INTRAVENOUS; SUBCUTANEOUS at 08:04

## 2021-04-21 RX ADMIN — LEVOTHYROXINE SODIUM 25 MCG: 25 TABLET ORAL at 06:04

## 2021-04-21 RX ADMIN — GABAPENTIN 400 MG: 400 CAPSULE ORAL at 10:04

## 2021-04-21 RX ADMIN — OXYCODONE 5 MG: 5 TABLET ORAL at 09:04

## 2021-04-21 RX ADMIN — NIFEDIPINE 30 MG: 30 TABLET, FILM COATED, EXTENDED RELEASE ORAL at 09:04

## 2021-04-21 RX ADMIN — OXYCODONE 5 MG: 5 TABLET ORAL at 06:04

## 2021-04-21 RX ADMIN — PANTOPRAZOLE SODIUM 40 MG: 40 TABLET, DELAYED RELEASE ORAL at 09:04

## 2021-04-21 RX ADMIN — ARIPIPRAZOLE 5 MG: 5 TABLET ORAL at 10:04

## 2021-04-21 RX ADMIN — HYDROMORPHONE HYDROCHLORIDE 0.5 MG: 1 INJECTION, SOLUTION INTRAMUSCULAR; INTRAVENOUS; SUBCUTANEOUS at 04:04

## 2021-04-21 RX ADMIN — METOCLOPRAMIDE 10 MG: 5 INJECTION, SOLUTION INTRAMUSCULAR; INTRAVENOUS at 11:04

## 2021-04-21 RX ADMIN — GABAPENTIN 400 MG: 400 CAPSULE ORAL at 02:04

## 2021-04-21 RX ADMIN — Medication 15 ML: at 09:04

## 2021-04-21 RX ADMIN — POTASSIUM CHLORIDE: 2 INJECTION, SOLUTION, CONCENTRATE INTRAVENOUS at 10:04

## 2021-04-21 RX ADMIN — METOCLOPRAMIDE 10 MG: 5 INJECTION, SOLUTION INTRAMUSCULAR; INTRAVENOUS at 12:04

## 2021-04-21 RX ADMIN — DIAZEPAM 5 MG: 5 INJECTION, SOLUTION INTRAMUSCULAR; INTRAVENOUS at 07:04

## 2021-04-22 LAB
ALBUMIN SERPL BCP-MCNC: 2.9 G/DL (ref 3.5–5.2)
ALP SERPL-CCNC: 42 U/L (ref 55–135)
ALT SERPL W/O P-5'-P-CCNC: 34 U/L (ref 10–44)
ANION GAP SERPL CALC-SCNC: 11 MMOL/L (ref 8–16)
AST SERPL-CCNC: 37 U/L (ref 10–40)
BASOPHILS # BLD AUTO: 0.02 K/UL (ref 0–0.2)
BASOPHILS NFR BLD: 0.3 % (ref 0–1.9)
BILIRUB SERPL-MCNC: 0.5 MG/DL (ref 0.1–1)
BUN SERPL-MCNC: 7 MG/DL (ref 8–23)
CALCIUM SERPL-MCNC: 8.1 MG/DL (ref 8.7–10.5)
CHLORIDE SERPL-SCNC: 106 MMOL/L (ref 95–110)
CO2 SERPL-SCNC: 21 MMOL/L (ref 23–29)
CREAT SERPL-MCNC: 0.7 MG/DL (ref 0.5–1.4)
DIFFERENTIAL METHOD: ABNORMAL
EOSINOPHIL # BLD AUTO: 0.1 K/UL (ref 0–0.5)
EOSINOPHIL NFR BLD: 1.6 % (ref 0–8)
ERYTHROCYTE [DISTWIDTH] IN BLOOD BY AUTOMATED COUNT: 16.9 % (ref 11.5–14.5)
EST. GFR  (AFRICAN AMERICAN): >60 ML/MIN/1.73 M^2
EST. GFR  (NON AFRICAN AMERICAN): >60 ML/MIN/1.73 M^2
GLUCOSE SERPL-MCNC: 95 MG/DL (ref 70–110)
HCT VFR BLD AUTO: 34.3 % (ref 37–48.5)
HGB BLD-MCNC: 10.6 G/DL (ref 12–16)
IMM GRANULOCYTES # BLD AUTO: 0.02 K/UL (ref 0–0.04)
IMM GRANULOCYTES NFR BLD AUTO: 0.3 % (ref 0–0.5)
LYMPHOCYTES # BLD AUTO: 2.8 K/UL (ref 1–4.8)
LYMPHOCYTES NFR BLD: 43.7 % (ref 18–48)
MAGNESIUM SERPL-MCNC: 1.5 MG/DL (ref 1.6–2.6)
MCH RBC QN AUTO: 26.8 PG (ref 27–31)
MCHC RBC AUTO-ENTMCNC: 30.9 G/DL (ref 32–36)
MCV RBC AUTO: 87 FL (ref 82–98)
MONOCYTES # BLD AUTO: 0.8 K/UL (ref 0.3–1)
MONOCYTES NFR BLD: 12.6 % (ref 4–15)
NEUTROPHILS # BLD AUTO: 2.7 K/UL (ref 1.8–7.7)
NEUTROPHILS NFR BLD: 41.5 % (ref 38–73)
NRBC BLD-RTO: 0 /100 WBC
PHOSPHATE SERPL-MCNC: 4 MG/DL (ref 2.7–4.5)
PLATELET # BLD AUTO: 88 K/UL (ref 150–450)
PMV BLD AUTO: 10.2 FL (ref 9.2–12.9)
POTASSIUM SERPL-SCNC: 3.9 MMOL/L (ref 3.5–5.1)
PROT SERPL-MCNC: 5.3 G/DL (ref 6–8.4)
RBC # BLD AUTO: 3.96 M/UL (ref 4–5.4)
SODIUM SERPL-SCNC: 138 MMOL/L (ref 136–145)
WBC # BLD AUTO: 6.43 K/UL (ref 3.9–12.7)

## 2021-04-22 PROCEDURE — 94640 AIRWAY INHALATION TREATMENT: CPT

## 2021-04-22 PROCEDURE — 99900035 HC TECH TIME PER 15 MIN (STAT)

## 2021-04-22 PROCEDURE — 36415 COLL VENOUS BLD VENIPUNCTURE: CPT | Performed by: HOSPITALIST

## 2021-04-22 PROCEDURE — 25000003 PHARM REV CODE 250: Performed by: HOSPITALIST

## 2021-04-22 PROCEDURE — 85025 COMPLETE CBC W/AUTO DIFF WBC: CPT | Performed by: HOSPITALIST

## 2021-04-22 PROCEDURE — 99233 SBSQ HOSP IP/OBS HIGH 50: CPT | Mod: ,,, | Performed by: HOSPITALIST

## 2021-04-22 PROCEDURE — 21400001 HC TELEMETRY ROOM

## 2021-04-22 PROCEDURE — 25000003 PHARM REV CODE 250: Performed by: INTERNAL MEDICINE

## 2021-04-22 PROCEDURE — 83735 ASSAY OF MAGNESIUM: CPT | Performed by: HOSPITALIST

## 2021-04-22 PROCEDURE — 94761 N-INVAS EAR/PLS OXIMETRY MLT: CPT

## 2021-04-22 PROCEDURE — 84100 ASSAY OF PHOSPHORUS: CPT | Performed by: HOSPITALIST

## 2021-04-22 PROCEDURE — 63600175 PHARM REV CODE 636 W HCPCS: Performed by: INTERNAL MEDICINE

## 2021-04-22 PROCEDURE — 63600175 PHARM REV CODE 636 W HCPCS: Performed by: HOSPITALIST

## 2021-04-22 PROCEDURE — 25000003 PHARM REV CODE 250: Performed by: NURSE PRACTITIONER

## 2021-04-22 PROCEDURE — 80053 COMPREHEN METABOLIC PANEL: CPT | Performed by: HOSPITALIST

## 2021-04-22 PROCEDURE — 99233 PR SUBSEQUENT HOSPITAL CARE,LEVL III: ICD-10-PCS | Mod: ,,, | Performed by: HOSPITALIST

## 2021-04-22 RX ORDER — ENOXAPARIN SODIUM 100 MG/ML
40 INJECTION SUBCUTANEOUS EVERY 24 HOURS
Status: DISCONTINUED | OUTPATIENT
Start: 2021-04-22 | End: 2021-04-23 | Stop reason: HOSPADM

## 2021-04-22 RX ORDER — DIAZEPAM 5 MG/1
10 TABLET ORAL 3 TIMES DAILY
Status: DISCONTINUED | OUTPATIENT
Start: 2021-04-22 | End: 2021-04-23 | Stop reason: HOSPADM

## 2021-04-22 RX ORDER — ONDANSETRON 2 MG/ML
8 INJECTION INTRAMUSCULAR; INTRAVENOUS EVERY 8 HOURS PRN
Status: DISCONTINUED | OUTPATIENT
Start: 2021-04-22 | End: 2021-04-23 | Stop reason: HOSPADM

## 2021-04-22 RX ORDER — MAGNESIUM SULFATE HEPTAHYDRATE 40 MG/ML
2 INJECTION, SOLUTION INTRAVENOUS ONCE
Status: COMPLETED | OUTPATIENT
Start: 2021-04-22 | End: 2021-04-22

## 2021-04-22 RX ORDER — FLUOXETINE HYDROCHLORIDE 20 MG/1
60 CAPSULE ORAL DAILY
Status: DISCONTINUED | OUTPATIENT
Start: 2021-04-22 | End: 2021-04-23 | Stop reason: HOSPADM

## 2021-04-22 RX ORDER — TALC
6 POWDER (GRAM) TOPICAL NIGHTLY PRN
Status: DISCONTINUED | OUTPATIENT
Start: 2021-04-22 | End: 2021-04-23 | Stop reason: HOSPADM

## 2021-04-22 RX ADMIN — HYDROMORPHONE HYDROCHLORIDE 0.5 MG: 1 INJECTION, SOLUTION INTRAMUSCULAR; INTRAVENOUS; SUBCUTANEOUS at 03:04

## 2021-04-22 RX ADMIN — TIOTROPIUM BROMIDE INHALATION SPRAY 2 PUFF: 3.12 SPRAY, METERED RESPIRATORY (INHALATION) at 07:04

## 2021-04-22 RX ADMIN — DIAZEPAM 10 MG: 5 TABLET ORAL at 03:04

## 2021-04-22 RX ADMIN — LOSARTAN POTASSIUM 50 MG: 50 TABLET, FILM COATED ORAL at 09:04

## 2021-04-22 RX ADMIN — METOCLOPRAMIDE 10 MG: 5 INJECTION, SOLUTION INTRAMUSCULAR; INTRAVENOUS at 05:04

## 2021-04-22 RX ADMIN — ALUMINUM HYDROXIDE, MAGNESIUM HYDROXIDE, DIMETHICONE 50 ML: 200; 200; 20 LIQUID ORAL at 12:04

## 2021-04-22 RX ADMIN — POTASSIUM CHLORIDE: 2 INJECTION, SOLUTION, CONCENTRATE INTRAVENOUS at 06:04

## 2021-04-22 RX ADMIN — CLONIDINE HYDROCHLORIDE 0.1 MG: 0.1 TABLET ORAL at 09:04

## 2021-04-22 RX ADMIN — NIFEDIPINE 30 MG: 30 TABLET, FILM COATED, EXTENDED RELEASE ORAL at 09:04

## 2021-04-22 RX ADMIN — ARIPIPRAZOLE 5 MG: 5 TABLET ORAL at 09:04

## 2021-04-22 RX ADMIN — PROMETHAZINE HYDROCHLORIDE 12.5 MG: 25 INJECTION INTRAMUSCULAR; INTRAVENOUS at 10:04

## 2021-04-22 RX ADMIN — METOCLOPRAMIDE 10 MG: 5 INJECTION, SOLUTION INTRAMUSCULAR; INTRAVENOUS at 11:04

## 2021-04-22 RX ADMIN — OXYCODONE 5 MG: 5 TABLET ORAL at 01:04

## 2021-04-22 RX ADMIN — CLONIDINE HYDROCHLORIDE 0.1 MG: 0.1 TABLET ORAL at 03:04

## 2021-04-22 RX ADMIN — ENOXAPARIN SODIUM 40 MG: 40 INJECTION SUBCUTANEOUS at 05:04

## 2021-04-22 RX ADMIN — Medication 6 MG: at 09:04

## 2021-04-22 RX ADMIN — HYDROMORPHONE HYDROCHLORIDE 0.5 MG: 1 INJECTION, SOLUTION INTRAMUSCULAR; INTRAVENOUS; SUBCUTANEOUS at 10:04

## 2021-04-22 RX ADMIN — ONDANSETRON 8 MG: 2 INJECTION INTRAMUSCULAR; INTRAVENOUS at 03:04

## 2021-04-22 RX ADMIN — LEVOTHYROXINE SODIUM 25 MCG: 25 TABLET ORAL at 05:04

## 2021-04-22 RX ADMIN — GABAPENTIN 400 MG: 400 CAPSULE ORAL at 03:04

## 2021-04-22 RX ADMIN — PANTOPRAZOLE SODIUM 40 MG: 40 TABLET, DELAYED RELEASE ORAL at 09:04

## 2021-04-22 RX ADMIN — DIAZEPAM 10 MG: 5 TABLET ORAL at 09:04

## 2021-04-22 RX ADMIN — FLUOXETINE 60 MG: 20 CAPSULE ORAL at 12:04

## 2021-04-22 RX ADMIN — METOCLOPRAMIDE 10 MG: 5 INJECTION, SOLUTION INTRAMUSCULAR; INTRAVENOUS at 12:04

## 2021-04-22 RX ADMIN — GABAPENTIN 400 MG: 400 CAPSULE ORAL at 09:04

## 2021-04-22 RX ADMIN — PROMETHAZINE HYDROCHLORIDE 12.5 MG: 25 INJECTION INTRAMUSCULAR; INTRAVENOUS at 09:04

## 2021-04-22 RX ADMIN — Medication 6 MG: at 02:04

## 2021-04-22 RX ADMIN — POTASSIUM CHLORIDE: 2 INJECTION, SOLUTION, CONCENTRATE INTRAVENOUS at 07:04

## 2021-04-22 RX ADMIN — HYDROMORPHONE HYDROCHLORIDE 0.5 MG: 1 INJECTION, SOLUTION INTRAMUSCULAR; INTRAVENOUS; SUBCUTANEOUS at 07:04

## 2021-04-22 RX ADMIN — DIAZEPAM 15 MG: 5 TABLET ORAL at 09:04

## 2021-04-22 RX ADMIN — MAGNESIUM SULFATE 2 G: 2 INJECTION INTRAVENOUS at 11:04

## 2021-04-23 VITALS
DIASTOLIC BLOOD PRESSURE: 56 MMHG | WEIGHT: 191.13 LBS | SYSTOLIC BLOOD PRESSURE: 113 MMHG | BODY MASS INDEX: 30.72 KG/M2 | OXYGEN SATURATION: 93 % | HEART RATE: 55 BPM | RESPIRATION RATE: 18 BRPM | HEIGHT: 66 IN | TEMPERATURE: 98 F

## 2021-04-23 LAB
ALBUMIN SERPL BCP-MCNC: 3.1 G/DL (ref 3.5–5.2)
ALP SERPL-CCNC: 44 U/L (ref 55–135)
ALT SERPL W/O P-5'-P-CCNC: 46 U/L (ref 10–44)
ANION GAP SERPL CALC-SCNC: 7 MMOL/L (ref 8–16)
AST SERPL-CCNC: 37 U/L (ref 10–40)
BASOPHILS # BLD AUTO: 0.02 K/UL (ref 0–0.2)
BASOPHILS NFR BLD: 0.3 % (ref 0–1.9)
BILIRUB SERPL-MCNC: 0.4 MG/DL (ref 0.1–1)
BUN SERPL-MCNC: 6 MG/DL (ref 8–23)
CALCIUM SERPL-MCNC: 8.1 MG/DL (ref 8.7–10.5)
CHLORIDE SERPL-SCNC: 104 MMOL/L (ref 95–110)
CO2 SERPL-SCNC: 24 MMOL/L (ref 23–29)
CREAT SERPL-MCNC: 0.8 MG/DL (ref 0.5–1.4)
DIFFERENTIAL METHOD: ABNORMAL
EOSINOPHIL # BLD AUTO: 0.1 K/UL (ref 0–0.5)
EOSINOPHIL NFR BLD: 1.7 % (ref 0–8)
ERYTHROCYTE [DISTWIDTH] IN BLOOD BY AUTOMATED COUNT: 16.8 % (ref 11.5–14.5)
EST. GFR  (AFRICAN AMERICAN): >60 ML/MIN/1.73 M^2
EST. GFR  (NON AFRICAN AMERICAN): >60 ML/MIN/1.73 M^2
FINAL PATHOLOGIC DIAGNOSIS: NORMAL
GLUCOSE SERPL-MCNC: 137 MG/DL (ref 70–110)
GROSS: NORMAL
HCT VFR BLD AUTO: 34.4 % (ref 37–48.5)
HGB BLD-MCNC: 10.6 G/DL (ref 12–16)
IMM GRANULOCYTES # BLD AUTO: 0.02 K/UL (ref 0–0.04)
IMM GRANULOCYTES NFR BLD AUTO: 0.3 % (ref 0–0.5)
LYMPHOCYTES # BLD AUTO: 2.6 K/UL (ref 1–4.8)
LYMPHOCYTES NFR BLD: 44.7 % (ref 18–48)
Lab: NORMAL
MAGNESIUM SERPL-MCNC: 1.9 MG/DL (ref 1.6–2.6)
MCH RBC QN AUTO: 26.8 PG (ref 27–31)
MCHC RBC AUTO-ENTMCNC: 30.8 G/DL (ref 32–36)
MCV RBC AUTO: 87 FL (ref 82–98)
MONOCYTES # BLD AUTO: 0.7 K/UL (ref 0.3–1)
MONOCYTES NFR BLD: 11.9 % (ref 4–15)
NEUTROPHILS # BLD AUTO: 2.4 K/UL (ref 1.8–7.7)
NEUTROPHILS NFR BLD: 41.1 % (ref 38–73)
NRBC BLD-RTO: 0 /100 WBC
PHOSPHATE SERPL-MCNC: 4 MG/DL (ref 2.7–4.5)
PLATELET # BLD AUTO: 100 K/UL (ref 150–450)
PMV BLD AUTO: 10.1 FL (ref 9.2–12.9)
POTASSIUM SERPL-SCNC: 3.7 MMOL/L (ref 3.5–5.1)
PROT SERPL-MCNC: 5.3 G/DL (ref 6–8.4)
RBC # BLD AUTO: 3.96 M/UL (ref 4–5.4)
SODIUM SERPL-SCNC: 135 MMOL/L (ref 136–145)
WBC # BLD AUTO: 5.9 K/UL (ref 3.9–12.7)

## 2021-04-23 PROCEDURE — 99239 PR HOSPITAL DISCHARGE DAY,>30 MIN: ICD-10-PCS | Mod: ,,, | Performed by: HOSPITALIST

## 2021-04-23 PROCEDURE — 84100 ASSAY OF PHOSPHORUS: CPT | Performed by: HOSPITALIST

## 2021-04-23 PROCEDURE — 94761 N-INVAS EAR/PLS OXIMETRY MLT: CPT

## 2021-04-23 PROCEDURE — 63600175 PHARM REV CODE 636 W HCPCS: Performed by: HOSPITALIST

## 2021-04-23 PROCEDURE — 99239 HOSP IP/OBS DSCHRG MGMT >30: CPT | Mod: ,,, | Performed by: HOSPITALIST

## 2021-04-23 PROCEDURE — 83735 ASSAY OF MAGNESIUM: CPT | Performed by: HOSPITALIST

## 2021-04-23 PROCEDURE — 94640 AIRWAY INHALATION TREATMENT: CPT

## 2021-04-23 PROCEDURE — 25000003 PHARM REV CODE 250: Performed by: INTERNAL MEDICINE

## 2021-04-23 PROCEDURE — 36415 COLL VENOUS BLD VENIPUNCTURE: CPT | Performed by: HOSPITALIST

## 2021-04-23 PROCEDURE — 85025 COMPLETE CBC W/AUTO DIFF WBC: CPT | Performed by: HOSPITALIST

## 2021-04-23 PROCEDURE — 25000003 PHARM REV CODE 250: Performed by: HOSPITALIST

## 2021-04-23 PROCEDURE — 80053 COMPREHEN METABOLIC PANEL: CPT | Performed by: HOSPITALIST

## 2021-04-23 PROCEDURE — 63600175 PHARM REV CODE 636 W HCPCS: Performed by: INTERNAL MEDICINE

## 2021-04-23 RX ORDER — LOSARTAN POTASSIUM 25 MG/1
25 TABLET ORAL NIGHTLY
Qty: 30 TABLET | Refills: 0 | Status: SHIPPED | OUTPATIENT
Start: 2021-04-23 | End: 2021-11-29

## 2021-04-23 RX ORDER — LEVOTHYROXINE SODIUM 25 UG/1
25 TABLET ORAL
Qty: 30 TABLET | Refills: 0 | Status: SHIPPED | OUTPATIENT
Start: 2021-04-24 | End: 2022-03-10 | Stop reason: DRUGHIGH

## 2021-04-23 RX ORDER — GABAPENTIN 400 MG/1
400 CAPSULE ORAL 3 TIMES DAILY
Qty: 90 CAPSULE | Refills: 0 | Status: SHIPPED | OUTPATIENT
Start: 2021-04-23 | End: 2022-03-10 | Stop reason: DRUGHIGH

## 2021-04-23 RX ORDER — ALBUTEROL SULFATE 90 UG/1
2 AEROSOL, METERED RESPIRATORY (INHALATION) EVERY 6 HOURS PRN
Qty: 18 G | Refills: 0 | Status: SHIPPED | OUTPATIENT
Start: 2021-04-23 | End: 2022-03-10

## 2021-04-23 RX ORDER — NIFEDIPINE 30 MG/1
30 TABLET, EXTENDED RELEASE ORAL DAILY
Qty: 30 TABLET | Refills: 0 | Status: SHIPPED | OUTPATIENT
Start: 2021-04-24 | End: 2021-11-29

## 2021-04-23 RX ORDER — ONDANSETRON 8 MG/1
8 TABLET, ORALLY DISINTEGRATING ORAL 3 TIMES DAILY PRN
Qty: 30 TABLET | Refills: 0 | Status: SHIPPED | OUTPATIENT
Start: 2021-04-23 | End: 2022-03-11

## 2021-04-23 RX ORDER — LANOLIN ALCOHOL/MO/W.PET/CERES
100 CREAM (GRAM) TOPICAL DAILY
Qty: 30 TABLET | Refills: 0 | Status: SHIPPED | OUTPATIENT
Start: 2021-04-23 | End: 2021-05-23

## 2021-04-23 RX ORDER — ARIPIPRAZOLE 5 MG/1
5 TABLET ORAL 2 TIMES DAILY
Qty: 60 TABLET | Refills: 0 | Status: SHIPPED | OUTPATIENT
Start: 2021-04-23 | End: 2022-01-25

## 2021-04-23 RX ORDER — IBUPROFEN 200 MG
1 TABLET ORAL DAILY
Qty: 30 PATCH | Refills: 0 | Status: SHIPPED | OUTPATIENT
Start: 2021-04-24 | End: 2021-05-24

## 2021-04-23 RX ORDER — FOLIC ACID 1 MG/1
1 TABLET ORAL DAILY
Qty: 30 TABLET | Refills: 0 | Status: SHIPPED | OUTPATIENT
Start: 2021-04-23 | End: 2022-03-10

## 2021-04-23 RX ORDER — DIAZEPAM 5 MG/1
TABLET ORAL
Qty: 21 TABLET | Refills: 0 | Status: SHIPPED | OUTPATIENT
Start: 2021-04-23 | End: 2021-11-30

## 2021-04-23 RX ORDER — PANTOPRAZOLE SODIUM 40 MG/1
40 TABLET, DELAYED RELEASE ORAL DAILY
Qty: 30 TABLET | Refills: 0 | Status: SHIPPED | OUTPATIENT
Start: 2021-04-24 | End: 2022-09-06 | Stop reason: SDUPTHER

## 2021-04-23 RX ADMIN — POTASSIUM CHLORIDE: 2 INJECTION, SOLUTION, CONCENTRATE INTRAVENOUS at 03:04

## 2021-04-23 RX ADMIN — NIFEDIPINE 30 MG: 30 TABLET, FILM COATED, EXTENDED RELEASE ORAL at 08:04

## 2021-04-23 RX ADMIN — ARIPIPRAZOLE 5 MG: 5 TABLET ORAL at 08:04

## 2021-04-23 RX ADMIN — ONDANSETRON 8 MG: 2 INJECTION INTRAMUSCULAR; INTRAVENOUS at 03:04

## 2021-04-23 RX ADMIN — LEVOTHYROXINE SODIUM 25 MCG: 25 TABLET ORAL at 06:04

## 2021-04-23 RX ADMIN — CLONIDINE HYDROCHLORIDE 0.1 MG: 0.1 TABLET ORAL at 08:04

## 2021-04-23 RX ADMIN — TIOTROPIUM BROMIDE INHALATION SPRAY 2 PUFF: 3.12 SPRAY, METERED RESPIRATORY (INHALATION) at 08:04

## 2021-04-23 RX ADMIN — PANTOPRAZOLE SODIUM 40 MG: 40 TABLET, DELAYED RELEASE ORAL at 08:04

## 2021-04-23 RX ADMIN — DIAZEPAM 10 MG: 5 TABLET ORAL at 08:04

## 2021-04-23 RX ADMIN — LOSARTAN POTASSIUM 50 MG: 50 TABLET, FILM COATED ORAL at 08:04

## 2021-04-23 RX ADMIN — OXYCODONE 5 MG: 5 TABLET ORAL at 03:04

## 2021-04-23 RX ADMIN — GABAPENTIN 400 MG: 400 CAPSULE ORAL at 08:04

## 2021-04-23 RX ADMIN — METOCLOPRAMIDE 10 MG: 5 INJECTION, SOLUTION INTRAMUSCULAR; INTRAVENOUS at 06:04

## 2021-04-23 RX ADMIN — FLUOXETINE 60 MG: 20 CAPSULE ORAL at 08:04

## 2021-05-12 ENCOUNTER — OFFICE VISIT (OUTPATIENT)
Dept: PLASTIC SURGERY | Facility: CLINIC | Age: 61
End: 2021-05-12
Payer: COMMERCIAL

## 2021-05-12 VITALS
DIASTOLIC BLOOD PRESSURE: 56 MMHG | WEIGHT: 191.13 LBS | HEART RATE: 80 BPM | SYSTOLIC BLOOD PRESSURE: 115 MMHG | BODY MASS INDEX: 30.72 KG/M2 | HEIGHT: 66 IN

## 2021-05-12 DIAGNOSIS — Z85.3 PERSONAL HISTORY OF MALIGNANT NEOPLASM OF BREAST: Primary | ICD-10-CM

## 2021-05-12 PROCEDURE — 3008F PR BODY MASS INDEX (BMI) DOCUMENTED: ICD-10-PCS | Mod: CPTII,S$GLB,, | Performed by: PHYSICIAN ASSISTANT

## 2021-05-12 PROCEDURE — 99024 POSTOP FOLLOW-UP VISIT: CPT | Mod: S$GLB,,, | Performed by: PHYSICIAN ASSISTANT

## 2021-05-12 PROCEDURE — 3008F BODY MASS INDEX DOCD: CPT | Mod: CPTII,S$GLB,, | Performed by: PHYSICIAN ASSISTANT

## 2021-05-12 PROCEDURE — 1126F PR PAIN SEVERITY QUANTIFIED, NO PAIN PRESENT: ICD-10-PCS | Mod: S$GLB,,, | Performed by: PHYSICIAN ASSISTANT

## 2021-05-12 PROCEDURE — 99999 PR PBB SHADOW E&M-EST. PATIENT-LVL III: ICD-10-PCS | Mod: PBBFAC,,, | Performed by: PHYSICIAN ASSISTANT

## 2021-05-12 PROCEDURE — 99999 PR PBB SHADOW E&M-EST. PATIENT-LVL III: CPT | Mod: PBBFAC,,, | Performed by: PHYSICIAN ASSISTANT

## 2021-05-12 PROCEDURE — 1126F AMNT PAIN NOTED NONE PRSNT: CPT | Mod: S$GLB,,, | Performed by: PHYSICIAN ASSISTANT

## 2021-05-12 PROCEDURE — 99024 PR POST-OP FOLLOW-UP VISIT: ICD-10-PCS | Mod: S$GLB,,, | Performed by: PHYSICIAN ASSISTANT

## 2021-05-19 ENCOUNTER — OFFICE VISIT (OUTPATIENT)
Dept: PLASTIC SURGERY | Facility: CLINIC | Age: 61
End: 2021-05-19
Payer: COMMERCIAL

## 2021-05-19 VITALS — HEART RATE: 93 BPM | SYSTOLIC BLOOD PRESSURE: 124 MMHG | DIASTOLIC BLOOD PRESSURE: 66 MMHG

## 2021-05-19 DIAGNOSIS — Z09 SURGERY FOLLOW-UP EXAMINATION: Primary | ICD-10-CM

## 2021-05-19 PROCEDURE — 99999 PR PBB SHADOW E&M-EST. PATIENT-LVL III: CPT | Mod: PBBFAC,,, | Performed by: SURGERY

## 2021-05-19 PROCEDURE — 1126F PR PAIN SEVERITY QUANTIFIED, NO PAIN PRESENT: ICD-10-PCS | Mod: S$GLB,,, | Performed by: SURGERY

## 2021-05-19 PROCEDURE — 1126F AMNT PAIN NOTED NONE PRSNT: CPT | Mod: S$GLB,,, | Performed by: SURGERY

## 2021-05-19 PROCEDURE — 99999 PR PBB SHADOW E&M-EST. PATIENT-LVL III: ICD-10-PCS | Mod: PBBFAC,,, | Performed by: SURGERY

## 2021-05-19 PROCEDURE — 99024 POSTOP FOLLOW-UP VISIT: CPT | Mod: S$GLB,,, | Performed by: SURGERY

## 2021-05-19 PROCEDURE — 99024 PR POST-OP FOLLOW-UP VISIT: ICD-10-PCS | Mod: S$GLB,,, | Performed by: SURGERY

## 2021-05-24 ENCOUNTER — OFFICE VISIT (OUTPATIENT)
Dept: HEMATOLOGY/ONCOLOGY | Facility: CLINIC | Age: 61
End: 2021-05-24
Payer: COMMERCIAL

## 2021-05-24 ENCOUNTER — HOSPITAL ENCOUNTER (OUTPATIENT)
Dept: RADIOLOGY | Facility: CLINIC | Age: 61
Discharge: HOME | End: 2021-05-24
Attending: INTERNAL MEDICINE
Payer: COMMERCIAL

## 2021-05-24 VITALS
RESPIRATION RATE: 18 BRPM | WEIGHT: 185.19 LBS | BODY MASS INDEX: 29.76 KG/M2 | HEIGHT: 66 IN | SYSTOLIC BLOOD PRESSURE: 129 MMHG | DIASTOLIC BLOOD PRESSURE: 56 MMHG | HEART RATE: 85 BPM | TEMPERATURE: 99 F | OXYGEN SATURATION: 90 %

## 2021-05-24 DIAGNOSIS — C50.111 MALIGNANT NEOPLASM OF CENTRAL PORTION OF RIGHT BREAST IN FEMALE, ESTROGEN RECEPTOR POSITIVE: ICD-10-CM

## 2021-05-24 DIAGNOSIS — Z17.0 MALIGNANT NEOPLASM OF CENTRAL PORTION OF RIGHT BREAST IN FEMALE, ESTROGEN RECEPTOR POSITIVE: ICD-10-CM

## 2021-05-24 DIAGNOSIS — C50.111 MALIGNANT NEOPLASM OF CENTRAL PORTION OF RIGHT BREAST IN FEMALE, ESTROGEN RECEPTOR POSITIVE: Primary | ICD-10-CM

## 2021-05-24 DIAGNOSIS — Z17.0 MALIGNANT NEOPLASM OF CENTRAL PORTION OF RIGHT BREAST IN FEMALE, ESTROGEN RECEPTOR POSITIVE: Primary | ICD-10-CM

## 2021-05-24 PROCEDURE — 77080 DEXA BONE DENSITY SPINE HIP: ICD-10-PCS | Mod: 26,,, | Performed by: INTERNAL MEDICINE

## 2021-05-24 PROCEDURE — 99213 PR OFFICE/OUTPT VISIT, EST, LEVL III, 20-29 MIN: ICD-10-PCS | Mod: S$GLB,,, | Performed by: INTERNAL MEDICINE

## 2021-05-24 PROCEDURE — 99999 PR PBB SHADOW E&M-EST. PATIENT-LVL IV: ICD-10-PCS | Mod: PBBFAC,,, | Performed by: INTERNAL MEDICINE

## 2021-05-24 PROCEDURE — 99213 OFFICE O/P EST LOW 20 MIN: CPT | Mod: S$GLB,,, | Performed by: INTERNAL MEDICINE

## 2021-05-24 PROCEDURE — 1126F PR PAIN SEVERITY QUANTIFIED, NO PAIN PRESENT: ICD-10-PCS | Mod: S$GLB,,, | Performed by: INTERNAL MEDICINE

## 2021-05-24 PROCEDURE — 3008F BODY MASS INDEX DOCD: CPT | Mod: CPTII,S$GLB,, | Performed by: INTERNAL MEDICINE

## 2021-05-24 PROCEDURE — 99999 PR PBB SHADOW E&M-EST. PATIENT-LVL IV: CPT | Mod: PBBFAC,,, | Performed by: INTERNAL MEDICINE

## 2021-05-24 PROCEDURE — 1126F AMNT PAIN NOTED NONE PRSNT: CPT | Mod: S$GLB,,, | Performed by: INTERNAL MEDICINE

## 2021-05-24 PROCEDURE — 77080 DXA BONE DENSITY AXIAL: CPT | Mod: TC

## 2021-05-24 PROCEDURE — 3008F PR BODY MASS INDEX (BMI) DOCUMENTED: ICD-10-PCS | Mod: CPTII,S$GLB,, | Performed by: INTERNAL MEDICINE

## 2021-05-24 PROCEDURE — 77080 DXA BONE DENSITY AXIAL: CPT | Mod: 26,,, | Performed by: INTERNAL MEDICINE

## 2021-05-24 RX ORDER — LETROZOLE 2.5 MG/1
TABLET, FILM COATED ORAL
COMMUNITY
Start: 2021-05-01 | End: 2022-02-01

## 2021-05-24 RX ORDER — TRAZODONE HYDROCHLORIDE 100 MG/1
TABLET ORAL
COMMUNITY
Start: 2021-04-30 | End: 2022-01-03 | Stop reason: SDUPTHER

## 2021-08-17 PROBLEM — R74.01 TRANSAMINITIS: Status: RESOLVED | Noted: 2020-02-10 | Resolved: 2021-08-17

## 2021-11-15 ENCOUNTER — TELEPHONE (OUTPATIENT)
Dept: INTERNAL MEDICINE | Facility: CLINIC | Age: 61
End: 2021-11-15
Payer: COMMERCIAL

## 2021-11-29 ENCOUNTER — OFFICE VISIT (OUTPATIENT)
Dept: INTERNAL MEDICINE | Facility: CLINIC | Age: 61
End: 2021-11-29
Attending: INTERNAL MEDICINE
Payer: COMMERCIAL

## 2021-11-29 VITALS
SYSTOLIC BLOOD PRESSURE: 114 MMHG | BODY MASS INDEX: 33.34 KG/M2 | DIASTOLIC BLOOD PRESSURE: 69 MMHG | WEIGHT: 207.44 LBS | HEART RATE: 93 BPM | HEIGHT: 66 IN | OXYGEN SATURATION: 95 %

## 2021-11-29 DIAGNOSIS — E66.9 OBESITY (BMI 30.0-34.9): ICD-10-CM

## 2021-11-29 DIAGNOSIS — I10 ESSENTIAL HYPERTENSION: ICD-10-CM

## 2021-11-29 DIAGNOSIS — D64.9 ANEMIA, UNSPECIFIED TYPE: Primary | ICD-10-CM

## 2021-11-29 DIAGNOSIS — R15.9 INCONTINENCE OF FECES, UNSPECIFIED FECAL INCONTINENCE TYPE: ICD-10-CM

## 2021-11-29 DIAGNOSIS — E03.9 HYPOTHYROIDISM, UNSPECIFIED TYPE: ICD-10-CM

## 2021-11-29 DIAGNOSIS — K74.60 HEPATIC CIRRHOSIS, UNSPECIFIED HEPATIC CIRRHOSIS TYPE, UNSPECIFIED WHETHER ASCITES PRESENT: ICD-10-CM

## 2021-11-29 DIAGNOSIS — F19.11 HISTORY OF SUBSTANCE ABUSE: ICD-10-CM

## 2021-11-29 DIAGNOSIS — E53.8 LOW SERUM VITAMIN B12: ICD-10-CM

## 2021-11-29 DIAGNOSIS — N39.41 URGE INCONTINENCE OF URINE: ICD-10-CM

## 2021-11-29 DIAGNOSIS — E11.9 CONTROLLED TYPE 2 DIABETES MELLITUS WITHOUT COMPLICATION, WITHOUT LONG-TERM CURRENT USE OF INSULIN: ICD-10-CM

## 2021-11-29 DIAGNOSIS — F10.21 ALCOHOL DEPENDENCE IN REMISSION: ICD-10-CM

## 2021-11-29 DIAGNOSIS — G47.33 OSA (OBSTRUCTIVE SLEEP APNEA): ICD-10-CM

## 2021-11-29 PROCEDURE — 99999 PR PBB SHADOW E&M-EST. PATIENT-LVL III: ICD-10-PCS | Mod: PBBFAC,,, | Performed by: INTERNAL MEDICINE

## 2021-11-29 PROCEDURE — 4010F PR ACE/ARB THEARPY RXD/TAKEN: ICD-10-PCS | Mod: CPTII,S$GLB,, | Performed by: INTERNAL MEDICINE

## 2021-11-29 PROCEDURE — 4010F ACE/ARB THERAPY RXD/TAKEN: CPT | Mod: CPTII,S$GLB,, | Performed by: INTERNAL MEDICINE

## 2021-11-29 PROCEDURE — 99999 PR PBB SHADOW E&M-EST. PATIENT-LVL III: CPT | Mod: PBBFAC,,, | Performed by: INTERNAL MEDICINE

## 2021-11-29 PROCEDURE — 99396 PR PREVENTIVE VISIT,EST,40-64: ICD-10-PCS | Mod: S$GLB,,, | Performed by: INTERNAL MEDICINE

## 2021-11-29 PROCEDURE — 99396 PREV VISIT EST AGE 40-64: CPT | Mod: S$GLB,,, | Performed by: INTERNAL MEDICINE

## 2021-11-29 RX ORDER — TIZANIDINE 4 MG/1
TABLET ORAL
COMMUNITY
Start: 2021-06-28 | End: 2021-11-29 | Stop reason: SDUPTHER

## 2021-11-29 RX ORDER — LEVALBUTEROL INHALATION SOLUTION 0.63 MG/3ML
SOLUTION RESPIRATORY (INHALATION)
COMMUNITY
Start: 2021-06-28 | End: 2022-03-10

## 2021-11-29 RX ORDER — ASPIRIN 325 MG/1
TABLET, FILM COATED ORAL
COMMUNITY
Start: 2021-06-22 | End: 2022-03-10

## 2021-11-29 RX ORDER — HYDROXYZINE PAMOATE 50 MG/1
25 CAPSULE ORAL EVERY 8 HOURS PRN
Status: ON HOLD | COMMUNITY
Start: 2021-06-28 | End: 2022-05-04 | Stop reason: HOSPADM

## 2021-11-29 RX ORDER — DULAGLUTIDE 0.75 MG/.5ML
0.75 INJECTION, SOLUTION SUBCUTANEOUS
Qty: 12 PEN | Refills: 1 | Status: SHIPPED | OUTPATIENT
Start: 2021-11-29 | End: 2022-01-25

## 2021-11-29 RX ORDER — DULOXETIN HYDROCHLORIDE 30 MG/1
30 CAPSULE, DELAYED RELEASE ORAL DAILY
COMMUNITY
End: 2022-01-03 | Stop reason: SDUPTHER

## 2021-11-29 RX ORDER — TIZANIDINE 4 MG/1
4 TABLET ORAL EVERY 8 HOURS
Qty: 30 TABLET | Refills: 0 | Status: SHIPPED | OUTPATIENT
Start: 2021-11-29 | End: 2022-03-10

## 2021-11-30 ENCOUNTER — TELEPHONE (OUTPATIENT)
Dept: INTERNAL MEDICINE | Facility: CLINIC | Age: 61
End: 2021-11-30
Payer: COMMERCIAL

## 2021-11-30 PROBLEM — E53.8 LOW SERUM VITAMIN B12: Status: ACTIVE | Noted: 2021-11-30

## 2021-11-30 PROBLEM — E66.9 OBESITY (BMI 30.0-34.9): Status: ACTIVE | Noted: 2021-11-30

## 2021-11-30 PROBLEM — E03.9 HYPOTHYROIDISM: Status: ACTIVE | Noted: 2021-11-30

## 2021-11-30 PROBLEM — D64.9 ANEMIA: Status: ACTIVE | Noted: 2021-11-30

## 2021-11-30 PROBLEM — E11.9 CONTROLLED TYPE 2 DIABETES MELLITUS WITHOUT COMPLICATION, WITHOUT LONG-TERM CURRENT USE OF INSULIN: Status: ACTIVE | Noted: 2021-11-30

## 2021-11-30 PROBLEM — N39.41 URGE INCONTINENCE OF URINE: Status: ACTIVE | Noted: 2021-11-30

## 2021-11-30 PROBLEM — R15.9 INCONTINENCE OF FECES: Status: ACTIVE | Noted: 2021-11-30

## 2021-11-30 PROBLEM — E66.811 OBESITY (BMI 30.0-34.9): Status: ACTIVE | Noted: 2021-11-30

## 2021-12-02 ENCOUNTER — TELEPHONE (OUTPATIENT)
Dept: INTERNAL MEDICINE | Facility: CLINIC | Age: 61
End: 2021-12-02
Payer: COMMERCIAL

## 2021-12-08 ENCOUNTER — TELEPHONE (OUTPATIENT)
Dept: INTERNAL MEDICINE | Facility: CLINIC | Age: 61
End: 2021-12-08
Payer: COMMERCIAL

## 2021-12-09 ENCOUNTER — OFFICE VISIT (OUTPATIENT)
Dept: URGENT CARE | Facility: CLINIC | Age: 61
End: 2021-12-09
Payer: COMMERCIAL

## 2021-12-09 VITALS
RESPIRATION RATE: 18 BRPM | OXYGEN SATURATION: 93 % | DIASTOLIC BLOOD PRESSURE: 83 MMHG | WEIGHT: 207 LBS | HEART RATE: 84 BPM | SYSTOLIC BLOOD PRESSURE: 131 MMHG | TEMPERATURE: 99 F | HEIGHT: 66 IN | BODY MASS INDEX: 33.27 KG/M2

## 2021-12-09 DIAGNOSIS — J06.9 VIRAL URI: ICD-10-CM

## 2021-12-09 DIAGNOSIS — R07.9 CHEST PAIN, UNSPECIFIED TYPE: Primary | ICD-10-CM

## 2021-12-09 DIAGNOSIS — R09.02 HYPOXEMIA: ICD-10-CM

## 2021-12-09 LAB
CTP QC/QA: YES
CTP QC/QA: YES
POC MOLECULAR INFLUENZA A AGN: NEGATIVE
POC MOLECULAR INFLUENZA B AGN: NEGATIVE
SARS-COV-2 RDRP RESP QL NAA+PROBE: NEGATIVE

## 2021-12-09 PROCEDURE — 71046 XR CHEST PA AND LATERAL: ICD-10-PCS | Mod: FY,S$GLB,, | Performed by: RADIOLOGY

## 2021-12-09 PROCEDURE — 93010 EKG 12-LEAD: ICD-10-PCS | Mod: S$GLB,,, | Performed by: INTERNAL MEDICINE

## 2021-12-09 PROCEDURE — 93005 EKG 12-LEAD: ICD-10-PCS | Mod: S$GLB,,,

## 2021-12-09 PROCEDURE — 87502 POCT INFLUENZA A/B MOLECULAR: ICD-10-PCS | Mod: QW,S$GLB,,

## 2021-12-09 PROCEDURE — 87502 INFLUENZA DNA AMP PROBE: CPT | Mod: QW,S$GLB,,

## 2021-12-09 PROCEDURE — U0002: ICD-10-PCS | Mod: QW,S$GLB,,

## 2021-12-09 PROCEDURE — 93010 ELECTROCARDIOGRAM REPORT: CPT | Mod: S$GLB,,, | Performed by: INTERNAL MEDICINE

## 2021-12-09 PROCEDURE — 4010F PR ACE/ARB THEARPY RXD/TAKEN: ICD-10-PCS | Mod: CPTII,S$GLB,,

## 2021-12-09 PROCEDURE — 71046 X-RAY EXAM CHEST 2 VIEWS: CPT | Mod: FY,S$GLB,, | Performed by: RADIOLOGY

## 2021-12-09 PROCEDURE — 4010F ACE/ARB THERAPY RXD/TAKEN: CPT | Mod: CPTII,S$GLB,,

## 2021-12-09 PROCEDURE — 99213 PR OFFICE/OUTPT VISIT, EST, LEVL III, 20-29 MIN: ICD-10-PCS | Mod: S$GLB,,,

## 2021-12-09 PROCEDURE — U0002 COVID-19 LAB TEST NON-CDC: HCPCS | Mod: QW,S$GLB,,

## 2021-12-09 PROCEDURE — 99213 OFFICE O/P EST LOW 20 MIN: CPT | Mod: S$GLB,,,

## 2021-12-09 PROCEDURE — 93005 ELECTROCARDIOGRAM TRACING: CPT | Mod: S$GLB,,,

## 2021-12-13 ENCOUNTER — OFFICE VISIT (OUTPATIENT)
Dept: UROGYNECOLOGY | Facility: CLINIC | Age: 61
End: 2021-12-13
Attending: INTERNAL MEDICINE
Payer: COMMERCIAL

## 2021-12-13 VITALS — HEIGHT: 66 IN | BODY MASS INDEX: 33.41 KG/M2

## 2021-12-13 DIAGNOSIS — N81.84 PELVIC MUSCLE WASTING: Primary | ICD-10-CM

## 2021-12-13 DIAGNOSIS — N39.41 URGE INCONTINENCE OF URINE: ICD-10-CM

## 2021-12-13 PROCEDURE — 99213 PR OFFICE/OUTPT VISIT, EST, LEVL III, 20-29 MIN: ICD-10-PCS | Mod: S$GLB,,, | Performed by: OBSTETRICS & GYNECOLOGY

## 2021-12-13 PROCEDURE — 99999 PR PBB SHADOW E&M-EST. PATIENT-LVL III: CPT | Mod: PBBFAC,,, | Performed by: OBSTETRICS & GYNECOLOGY

## 2021-12-13 PROCEDURE — 4010F PR ACE/ARB THEARPY RXD/TAKEN: ICD-10-PCS | Mod: CPTII,S$GLB,, | Performed by: OBSTETRICS & GYNECOLOGY

## 2021-12-13 PROCEDURE — 99213 OFFICE O/P EST LOW 20 MIN: CPT | Mod: S$GLB,,, | Performed by: OBSTETRICS & GYNECOLOGY

## 2021-12-13 PROCEDURE — 99999 PR PBB SHADOW E&M-EST. PATIENT-LVL III: ICD-10-PCS | Mod: PBBFAC,,, | Performed by: OBSTETRICS & GYNECOLOGY

## 2021-12-13 PROCEDURE — 4010F ACE/ARB THERAPY RXD/TAKEN: CPT | Mod: CPTII,S$GLB,, | Performed by: OBSTETRICS & GYNECOLOGY

## 2021-12-14 ENCOUNTER — OFFICE VISIT (OUTPATIENT)
Dept: SURGERY | Facility: CLINIC | Age: 61
End: 2021-12-14
Payer: COMMERCIAL

## 2021-12-14 ENCOUNTER — TELEPHONE (OUTPATIENT)
Dept: UROLOGY | Facility: CLINIC | Age: 61
End: 2021-12-14
Payer: COMMERCIAL

## 2021-12-14 VITALS
DIASTOLIC BLOOD PRESSURE: 70 MMHG | BODY MASS INDEX: 33.34 KG/M2 | WEIGHT: 206.56 LBS | HEART RATE: 117 BPM | SYSTOLIC BLOOD PRESSURE: 140 MMHG

## 2021-12-14 DIAGNOSIS — R15.9 INCONTINENCE OF FECES, UNSPECIFIED FECAL INCONTINENCE TYPE: ICD-10-CM

## 2021-12-14 PROCEDURE — 99999 PR PBB SHADOW E&M-EST. PATIENT-LVL III: ICD-10-PCS | Mod: PBBFAC,,, | Performed by: NURSE PRACTITIONER

## 2021-12-14 PROCEDURE — 4010F PR ACE/ARB THEARPY RXD/TAKEN: ICD-10-PCS | Mod: CPTII,S$GLB,, | Performed by: NURSE PRACTITIONER

## 2021-12-14 PROCEDURE — 4010F ACE/ARB THERAPY RXD/TAKEN: CPT | Mod: CPTII,S$GLB,, | Performed by: NURSE PRACTITIONER

## 2021-12-14 PROCEDURE — 99999 PR PBB SHADOW E&M-EST. PATIENT-LVL III: CPT | Mod: PBBFAC,,, | Performed by: NURSE PRACTITIONER

## 2021-12-14 PROCEDURE — 46600 PR DIAG2STIC A2SCOPY: ICD-10-PCS | Mod: S$GLB,,, | Performed by: NURSE PRACTITIONER

## 2021-12-14 PROCEDURE — 46600 DIAGNOSTIC ANOSCOPY SPX: CPT | Mod: S$GLB,,, | Performed by: NURSE PRACTITIONER

## 2021-12-14 PROCEDURE — 99204 PR OFFICE/OUTPT VISIT, NEW, LEVL IV, 45-59 MIN: ICD-10-PCS | Mod: 25,S$GLB,, | Performed by: NURSE PRACTITIONER

## 2021-12-14 PROCEDURE — 99204 OFFICE O/P NEW MOD 45 MIN: CPT | Mod: 25,S$GLB,, | Performed by: NURSE PRACTITIONER

## 2021-12-17 ENCOUNTER — OFFICE VISIT (OUTPATIENT)
Dept: SURGERY | Facility: CLINIC | Age: 61
End: 2021-12-17
Attending: COLON & RECTAL SURGERY
Payer: COMMERCIAL

## 2021-12-17 VITALS
BODY MASS INDEX: 31.49 KG/M2 | DIASTOLIC BLOOD PRESSURE: 69 MMHG | WEIGHT: 195.13 LBS | SYSTOLIC BLOOD PRESSURE: 153 MMHG

## 2021-12-17 DIAGNOSIS — R15.9 INCONTINENCE OF FECES, UNSPECIFIED FECAL INCONTINENCE TYPE: Primary | ICD-10-CM

## 2021-12-17 PROCEDURE — 99213 PR OFFICE/OUTPT VISIT, EST, LEVL III, 20-29 MIN: ICD-10-PCS | Mod: S$GLB,,, | Performed by: COLON & RECTAL SURGERY

## 2021-12-17 PROCEDURE — 4010F PR ACE/ARB THEARPY RXD/TAKEN: ICD-10-PCS | Mod: CPTII,S$GLB,, | Performed by: COLON & RECTAL SURGERY

## 2021-12-17 PROCEDURE — 4010F ACE/ARB THERAPY RXD/TAKEN: CPT | Mod: CPTII,S$GLB,, | Performed by: COLON & RECTAL SURGERY

## 2021-12-17 PROCEDURE — 99999 PR PBB SHADOW E&M-EST. PATIENT-LVL II: ICD-10-PCS | Mod: PBBFAC,,, | Performed by: COLON & RECTAL SURGERY

## 2021-12-17 PROCEDURE — 99999 PR PBB SHADOW E&M-EST. PATIENT-LVL II: CPT | Mod: PBBFAC,,, | Performed by: COLON & RECTAL SURGERY

## 2021-12-17 PROCEDURE — 99213 OFFICE O/P EST LOW 20 MIN: CPT | Mod: S$GLB,,, | Performed by: COLON & RECTAL SURGERY

## 2021-12-21 ENCOUNTER — CLINICAL SUPPORT (OUTPATIENT)
Dept: REHABILITATION | Facility: OTHER | Age: 61
End: 2021-12-21
Attending: OBSTETRICS & GYNECOLOGY
Payer: COMMERCIAL

## 2021-12-21 DIAGNOSIS — N81.84 PELVIC MUSCLE WASTING: ICD-10-CM

## 2021-12-21 DIAGNOSIS — R15.9 FULL INCONTINENCE OF FECES: ICD-10-CM

## 2021-12-21 DIAGNOSIS — M62.89 PELVIC FLOOR TENSION: ICD-10-CM

## 2021-12-21 DIAGNOSIS — N39.46 MIXED INCONTINENCE: ICD-10-CM

## 2021-12-21 DIAGNOSIS — N39.41 URGE INCONTINENCE OF URINE: ICD-10-CM

## 2021-12-21 PROCEDURE — 97112 NEUROMUSCULAR REEDUCATION: CPT | Mod: 97

## 2021-12-21 PROCEDURE — 97163 PT EVAL HIGH COMPLEX 45 MIN: CPT | Mod: 97

## 2021-12-29 ENCOUNTER — PATIENT MESSAGE (OUTPATIENT)
Dept: UROLOGY | Facility: CLINIC | Age: 61
End: 2021-12-29
Payer: COMMERCIAL

## 2022-01-03 ENCOUNTER — PATIENT MESSAGE (OUTPATIENT)
Dept: INTERNAL MEDICINE | Facility: CLINIC | Age: 62
End: 2022-01-03
Payer: COMMERCIAL

## 2022-01-03 ENCOUNTER — PATIENT OUTREACH (OUTPATIENT)
Dept: ADMINISTRATIVE | Facility: HOSPITAL | Age: 62
End: 2022-01-03
Payer: COMMERCIAL

## 2022-01-03 NOTE — TELEPHONE ENCOUNTER
Care Due:                  Date            Visit Type   Department     Provider  --------------------------------------------------------------------------------                                             [facility] INTERNAL  Last Visit: 11-      None         NIMISHA Rodriguez  Next Visit: None Scheduled  None         None Found                                                            Last  Test          Frequency    Reason                     Performed    Due Date  --------------------------------------------------------------------------------    HBA1C.......  6 months...  dulaglutide..............  Not Found    Overdue    Powered by [employer]. Reference number: [id number].   1/03/2022 3:53:38 PM CST

## 2022-01-05 ENCOUNTER — PATIENT MESSAGE (OUTPATIENT)
Dept: INTERNAL MEDICINE | Facility: CLINIC | Age: 62
End: 2022-01-05
Payer: COMMERCIAL

## 2022-01-05 RX ORDER — DULOXETIN HYDROCHLORIDE 30 MG/1
30 CAPSULE, DELAYED RELEASE ORAL DAILY
Qty: 90 CAPSULE | Refills: 0 | Status: SHIPPED | OUTPATIENT
Start: 2022-01-05 | End: 2022-01-31 | Stop reason: SDUPTHER

## 2022-01-05 RX ORDER — TRAZODONE HYDROCHLORIDE 100 MG/1
100 TABLET ORAL NIGHTLY PRN
Qty: 30 TABLET | Refills: 0 | Status: SHIPPED | OUTPATIENT
Start: 2022-01-05 | End: 2022-01-31 | Stop reason: SDUPTHER

## 2022-01-06 ENCOUNTER — TELEPHONE (OUTPATIENT)
Dept: UROLOGY | Facility: CLINIC | Age: 62
End: 2022-01-06
Payer: COMMERCIAL

## 2022-01-06 NOTE — TELEPHONE ENCOUNTER
Called patient to schedule SNM consult w/Dr. Edwards- patient declines and would like to focus on FI.     Pt is currently on FI pathway w/Dr. Umaña-   · 2 week bowel diary  · FiberCon w/Immodium PRN  · Biofeedback

## 2022-01-08 ENCOUNTER — PATIENT MESSAGE (OUTPATIENT)
Dept: ADMINISTRATIVE | Facility: HOSPITAL | Age: 62
End: 2022-01-08
Payer: COMMERCIAL

## 2022-01-08 ENCOUNTER — OFFICE VISIT (OUTPATIENT)
Dept: URGENT CARE | Facility: CLINIC | Age: 62
End: 2022-01-08
Payer: COMMERCIAL

## 2022-01-08 ENCOUNTER — HOSPITAL ENCOUNTER (INPATIENT)
Facility: HOSPITAL | Age: 62
LOS: 5 days | Discharge: HOME | DRG: 177 | End: 2022-01-13
Attending: EMERGENCY MEDICINE | Admitting: INTERNAL MEDICINE
Payer: COMMERCIAL

## 2022-01-08 VITALS
TEMPERATURE: 99 F | HEART RATE: 91 BPM | WEIGHT: 195 LBS | SYSTOLIC BLOOD PRESSURE: 143 MMHG | OXYGEN SATURATION: 85 % | DIASTOLIC BLOOD PRESSURE: 83 MMHG | BODY MASS INDEX: 31.47 KG/M2

## 2022-01-08 DIAGNOSIS — U07.1 COVID-19: ICD-10-CM

## 2022-01-08 DIAGNOSIS — R06.02 SHORTNESS OF BREATH: ICD-10-CM

## 2022-01-08 DIAGNOSIS — Z87.09 HISTORY OF COPD: ICD-10-CM

## 2022-01-08 DIAGNOSIS — Z72.0 TOBACCO ABUSE: ICD-10-CM

## 2022-01-08 DIAGNOSIS — F41.8 DEPRESSION WITH ANXIETY: ICD-10-CM

## 2022-01-08 DIAGNOSIS — J96.01 ACUTE HYPOXEMIC RESPIRATORY FAILURE: ICD-10-CM

## 2022-01-08 DIAGNOSIS — F10.939 ALCOHOL WITHDRAWAL SYNDROME WITH COMPLICATION: ICD-10-CM

## 2022-01-08 DIAGNOSIS — R07.9 CHEST PAIN: ICD-10-CM

## 2022-01-08 DIAGNOSIS — U07.1 COVID-19 VIRUS DETECTED: Primary | ICD-10-CM

## 2022-01-08 DIAGNOSIS — R79.81 LOW OXYGEN SATURATION: ICD-10-CM

## 2022-01-08 DIAGNOSIS — F10.20 ALCOHOL USE DISORDER, SEVERE, DEPENDENCE: Chronic | ICD-10-CM

## 2022-01-08 DIAGNOSIS — J44.9 CHRONIC OBSTRUCTIVE PULMONARY DISEASE, UNSPECIFIED COPD TYPE: ICD-10-CM

## 2022-01-08 DIAGNOSIS — R94.31 QT PROLONGATION: ICD-10-CM

## 2022-01-08 DIAGNOSIS — F10.930 ALCOHOL WITHDRAWAL SYNDROME WITHOUT COMPLICATION: ICD-10-CM

## 2022-01-08 LAB
ALBUMIN SERPL BCP-MCNC: 4 G/DL (ref 3.5–5.2)
ALP SERPL-CCNC: 70 U/L (ref 55–135)
ALT SERPL W/O P-5'-P-CCNC: 48 U/L (ref 10–44)
AMPHET+METHAMPHET UR QL: NEGATIVE
ANION GAP SERPL CALC-SCNC: 16 MMOL/L (ref 8–16)
ANISOCYTOSIS BLD QL SMEAR: SLIGHT
APTT BLDCRRT: 23.8 SEC (ref 21–32)
AST SERPL-CCNC: 49 U/L (ref 10–40)
BARBITURATES UR QL SCN>200 NG/ML: NEGATIVE
BASOPHILS # BLD AUTO: 0.02 K/UL (ref 0–0.2)
BASOPHILS NFR BLD: 0.5 % (ref 0–1.9)
BENZODIAZ UR QL SCN>200 NG/ML: ABNORMAL
BILIRUB SERPL-MCNC: 0.4 MG/DL (ref 0.1–1)
BNP SERPL-MCNC: <10 PG/ML (ref 0–99)
BUN SERPL-MCNC: 8 MG/DL (ref 8–23)
BZE UR QL SCN: NEGATIVE
CALCIUM SERPL-MCNC: 8.8 MG/DL (ref 8.7–10.5)
CANNABINOIDS UR QL SCN: ABNORMAL
CHLORIDE SERPL-SCNC: 101 MMOL/L (ref 95–110)
CK SERPL-CCNC: 64 U/L (ref 20–180)
CO2 SERPL-SCNC: 22 MMOL/L (ref 23–29)
CREAT SERPL-MCNC: 0.7 MG/DL (ref 0.5–1.4)
CREAT UR-MCNC: 151 MG/DL (ref 15–325)
CRP SERPL-MCNC: 14.1 MG/L (ref 0–8.2)
CTP QC/QA: YES
D DIMER PPP IA.FEU-MCNC: 0.41 MG/L FEU
DIFFERENTIAL METHOD: ABNORMAL
EOSINOPHIL # BLD AUTO: 0 K/UL (ref 0–0.5)
EOSINOPHIL NFR BLD: 0.5 % (ref 0–8)
ERYTHROCYTE [DISTWIDTH] IN BLOOD BY AUTOMATED COUNT: 16 % (ref 11.5–14.5)
ERYTHROCYTE [SEDIMENTATION RATE] IN BLOOD BY WESTERGREN METHOD: 8 MM/HR (ref 0–36)
EST. GFR  (AFRICAN AMERICAN): >60 ML/MIN/1.73 M^2
EST. GFR  (NON AFRICAN AMERICAN): >60 ML/MIN/1.73 M^2
ETHANOL SERPL-MCNC: 10 MG/DL
ETHANOL UR-MCNC: 175 MG/DL
FERRITIN SERPL-MCNC: 36 NG/ML (ref 20–300)
GLUCOSE SERPL-MCNC: 91 MG/DL (ref 70–110)
HCT VFR BLD AUTO: 39.4 % (ref 37–48.5)
HGB BLD-MCNC: 12 G/DL (ref 12–16)
IMM GRANULOCYTES # BLD AUTO: 0.02 K/UL (ref 0–0.04)
IMM GRANULOCYTES NFR BLD AUTO: 0.5 % (ref 0–0.5)
INR PPP: 0.9 (ref 0.8–1.2)
LACTATE SERPL-SCNC: 1.4 MMOL/L (ref 0.5–2.2)
LACTATE SERPL-SCNC: 2.6 MMOL/L (ref 0.5–2.2)
LDH SERPL L TO P-CCNC: 226 U/L (ref 110–260)
LYMPHOCYTES # BLD AUTO: 2.1 K/UL (ref 1–4.8)
LYMPHOCYTES NFR BLD: 47.8 % (ref 18–48)
MCH RBC QN AUTO: 26.4 PG (ref 27–31)
MCHC RBC AUTO-ENTMCNC: 30.5 G/DL (ref 32–36)
MCV RBC AUTO: 87 FL (ref 82–98)
METHADONE UR QL SCN>300 NG/ML: NEGATIVE
MONOCYTES # BLD AUTO: 0.5 K/UL (ref 0.3–1)
MONOCYTES NFR BLD: 10.9 % (ref 4–15)
NEUTROPHILS # BLD AUTO: 1.8 K/UL (ref 1.8–7.7)
NEUTROPHILS NFR BLD: 39.8 % (ref 38–73)
NRBC BLD-RTO: 0 /100 WBC
OPIATES UR QL SCN: NEGATIVE
PCP UR QL SCN>25 NG/ML: NEGATIVE
PLATELET # BLD AUTO: 82 K/UL (ref 150–450)
PLATELET BLD QL SMEAR: ABNORMAL
PMV BLD AUTO: 9.9 FL (ref 9.2–12.9)
POCT GLUCOSE: 137 MG/DL (ref 70–110)
POIKILOCYTOSIS BLD QL SMEAR: SLIGHT
POTASSIUM SERPL-SCNC: 4.4 MMOL/L (ref 3.5–5.1)
PROT SERPL-MCNC: 6.9 G/DL (ref 6–8.4)
PROTHROMBIN TIME: 10.3 SEC (ref 9–12.5)
RBC # BLD AUTO: 4.55 M/UL (ref 4–5.4)
SARS-COV-2 RDRP RESP QL NAA+PROBE: POSITIVE
SODIUM SERPL-SCNC: 139 MMOL/L (ref 136–145)
TOXICOLOGY INFORMATION: ABNORMAL
TROPONIN I SERPL DL<=0.01 NG/ML-MCNC: 0.01 NG/ML (ref 0–0.03)
TSH SERPL DL<=0.005 MIU/L-ACNC: 0.99 UIU/ML (ref 0.4–4)
WBC # BLD AUTO: 4.41 K/UL (ref 3.9–12.7)

## 2022-01-08 PROCEDURE — 99285 PR EMERGENCY DEPT VISIT,LEVEL V: ICD-10-PCS | Mod: ,,, | Performed by: EMERGENCY MEDICINE

## 2022-01-08 PROCEDURE — 85652 RBC SED RATE AUTOMATED: CPT | Performed by: STUDENT IN AN ORGANIZED HEALTH CARE EDUCATION/TRAINING PROGRAM

## 2022-01-08 PROCEDURE — 1159F MED LIST DOCD IN RCRD: CPT | Mod: CPTII,S$GLB,, | Performed by: NURSE PRACTITIONER

## 2022-01-08 PROCEDURE — 99900035 HC TECH TIME PER 15 MIN (STAT)

## 2022-01-08 PROCEDURE — 3077F PR MOST RECENT SYSTOLIC BLOOD PRESSURE >= 140 MM HG: ICD-10-PCS | Mod: CPTII,S$GLB,, | Performed by: NURSE PRACTITIONER

## 2022-01-08 PROCEDURE — 82550 ASSAY OF CK (CPK): CPT | Performed by: NURSE PRACTITIONER

## 2022-01-08 PROCEDURE — 84443 ASSAY THYROID STIM HORMONE: CPT | Performed by: NURSE PRACTITIONER

## 2022-01-08 PROCEDURE — 84484 ASSAY OF TROPONIN QUANT: CPT | Performed by: NURSE PRACTITIONER

## 2022-01-08 PROCEDURE — U0002 COVID-19 LAB TEST NON-CDC: HCPCS | Mod: QW,S$GLB,, | Performed by: NURSE PRACTITIONER

## 2022-01-08 PROCEDURE — 85025 COMPLETE CBC W/AUTO DIFF WBC: CPT | Performed by: NURSE PRACTITIONER

## 2022-01-08 PROCEDURE — 87040 BLOOD CULTURE FOR BACTERIA: CPT | Performed by: INTERNAL MEDICINE

## 2022-01-08 PROCEDURE — 94761 N-INVAS EAR/PLS OXIMETRY MLT: CPT

## 2022-01-08 PROCEDURE — 83615 LACTATE (LD) (LDH) ENZYME: CPT | Performed by: NURSE PRACTITIONER

## 2022-01-08 PROCEDURE — 25000003 PHARM REV CODE 250: Performed by: NURSE PRACTITIONER

## 2022-01-08 PROCEDURE — 25000003 PHARM REV CODE 250: Performed by: STUDENT IN AN ORGANIZED HEALTH CARE EDUCATION/TRAINING PROGRAM

## 2022-01-08 PROCEDURE — 80053 COMPREHEN METABOLIC PANEL: CPT | Performed by: NURSE PRACTITIONER

## 2022-01-08 PROCEDURE — 27000221 HC OXYGEN, UP TO 24 HOURS

## 2022-01-08 PROCEDURE — 96361 HYDRATE IV INFUSION ADD-ON: CPT

## 2022-01-08 PROCEDURE — 71046 X-RAY EXAM CHEST 2 VIEWS: CPT | Mod: FY,S$GLB,, | Performed by: RADIOLOGY

## 2022-01-08 PROCEDURE — 63600175 PHARM REV CODE 636 W HCPCS: Performed by: STUDENT IN AN ORGANIZED HEALTH CARE EDUCATION/TRAINING PROGRAM

## 2022-01-08 PROCEDURE — 99285 EMERGENCY DEPT VISIT HI MDM: CPT | Mod: ,,, | Performed by: EMERGENCY MEDICINE

## 2022-01-08 PROCEDURE — 83880 ASSAY OF NATRIURETIC PEPTIDE: CPT | Performed by: STUDENT IN AN ORGANIZED HEALTH CARE EDUCATION/TRAINING PROGRAM

## 2022-01-08 PROCEDURE — 27000207 HC ISOLATION

## 2022-01-08 PROCEDURE — 86140 C-REACTIVE PROTEIN: CPT | Performed by: NURSE PRACTITIONER

## 2022-01-08 PROCEDURE — 85610 PROTHROMBIN TIME: CPT | Performed by: STUDENT IN AN ORGANIZED HEALTH CARE EDUCATION/TRAINING PROGRAM

## 2022-01-08 PROCEDURE — 80307 DRUG TEST PRSMV CHEM ANLYZR: CPT | Performed by: STUDENT IN AN ORGANIZED HEALTH CARE EDUCATION/TRAINING PROGRAM

## 2022-01-08 PROCEDURE — 25000242 PHARM REV CODE 250 ALT 637 W/ HCPCS: Performed by: STUDENT IN AN ORGANIZED HEALTH CARE EDUCATION/TRAINING PROGRAM

## 2022-01-08 PROCEDURE — 12000002 HC ACUTE/MED SURGE SEMI-PRIVATE ROOM

## 2022-01-08 PROCEDURE — 3008F PR BODY MASS INDEX (BMI) DOCUMENTED: ICD-10-PCS | Mod: CPTII,S$GLB,, | Performed by: NURSE PRACTITIONER

## 2022-01-08 PROCEDURE — 63600175 PHARM REV CODE 636 W HCPCS: Performed by: NURSE PRACTITIONER

## 2022-01-08 PROCEDURE — 3008F BODY MASS INDEX DOCD: CPT | Mod: CPTII,S$GLB,, | Performed by: NURSE PRACTITIONER

## 2022-01-08 PROCEDURE — 82962 GLUCOSE BLOOD TEST: CPT

## 2022-01-08 PROCEDURE — 94640 AIRWAY INHALATION TREATMENT: CPT

## 2022-01-08 PROCEDURE — 71046 XR CHEST PA AND LATERAL: ICD-10-PCS | Mod: FY,S$GLB,, | Performed by: RADIOLOGY

## 2022-01-08 PROCEDURE — 99285 EMERGENCY DEPT VISIT HI MDM: CPT | Mod: 25

## 2022-01-08 PROCEDURE — C9399 UNCLASSIFIED DRUGS OR BIOLOG: HCPCS | Performed by: STUDENT IN AN ORGANIZED HEALTH CARE EDUCATION/TRAINING PROGRAM

## 2022-01-08 PROCEDURE — 93010 ELECTROCARDIOGRAM REPORT: CPT | Mod: ,,, | Performed by: INTERNAL MEDICINE

## 2022-01-08 PROCEDURE — 93010 EKG 12-LEAD: ICD-10-PCS | Mod: ,,, | Performed by: INTERNAL MEDICINE

## 2022-01-08 PROCEDURE — 3079F PR MOST RECENT DIASTOLIC BLOOD PRESSURE 80-89 MM HG: ICD-10-PCS | Mod: CPTII,S$GLB,, | Performed by: NURSE PRACTITIONER

## 2022-01-08 PROCEDURE — 85379 FIBRIN DEGRADATION QUANT: CPT | Performed by: STUDENT IN AN ORGANIZED HEALTH CARE EDUCATION/TRAINING PROGRAM

## 2022-01-08 PROCEDURE — 82728 ASSAY OF FERRITIN: CPT | Performed by: NURSE PRACTITIONER

## 2022-01-08 PROCEDURE — 82077 ASSAY SPEC XCP UR&BREATH IA: CPT | Performed by: STUDENT IN AN ORGANIZED HEALTH CARE EDUCATION/TRAINING PROGRAM

## 2022-01-08 PROCEDURE — 1159F PR MEDICATION LIST DOCUMENTED IN MEDICAL RECORD: ICD-10-PCS | Mod: CPTII,S$GLB,, | Performed by: NURSE PRACTITIONER

## 2022-01-08 PROCEDURE — 3079F DIAST BP 80-89 MM HG: CPT | Mod: CPTII,S$GLB,, | Performed by: NURSE PRACTITIONER

## 2022-01-08 PROCEDURE — 3077F SYST BP >= 140 MM HG: CPT | Mod: CPTII,S$GLB,, | Performed by: NURSE PRACTITIONER

## 2022-01-08 PROCEDURE — 83605 ASSAY OF LACTIC ACID: CPT | Performed by: NURSE PRACTITIONER

## 2022-01-08 PROCEDURE — 96374 THER/PROPH/DIAG INJ IV PUSH: CPT

## 2022-01-08 PROCEDURE — 99223 PR INITIAL HOSPITAL CARE,LEVL III: ICD-10-PCS | Mod: ,,, | Performed by: INTERNAL MEDICINE

## 2022-01-08 PROCEDURE — 99223 1ST HOSP IP/OBS HIGH 75: CPT | Mod: ,,, | Performed by: INTERNAL MEDICINE

## 2022-01-08 PROCEDURE — 93005 ELECTROCARDIOGRAM TRACING: CPT

## 2022-01-08 PROCEDURE — 99214 OFFICE O/P EST MOD 30 MIN: CPT | Mod: S$GLB,,, | Performed by: NURSE PRACTITIONER

## 2022-01-08 PROCEDURE — 83605 ASSAY OF LACTIC ACID: CPT | Mod: 91 | Performed by: STUDENT IN AN ORGANIZED HEALTH CARE EDUCATION/TRAINING PROGRAM

## 2022-01-08 PROCEDURE — 85730 THROMBOPLASTIN TIME PARTIAL: CPT | Performed by: STUDENT IN AN ORGANIZED HEALTH CARE EDUCATION/TRAINING PROGRAM

## 2022-01-08 PROCEDURE — U0002: ICD-10-PCS | Mod: QW,S$GLB,, | Performed by: NURSE PRACTITIONER

## 2022-01-08 PROCEDURE — 96375 TX/PRO/DX INJ NEW DRUG ADDON: CPT

## 2022-01-08 PROCEDURE — 99214 PR OFFICE/OUTPT VISIT, EST, LEVL IV, 30-39 MIN: ICD-10-PCS | Mod: S$GLB,,, | Performed by: NURSE PRACTITIONER

## 2022-01-08 RX ORDER — NALOXONE HCL 0.4 MG/ML
0.02 VIAL (ML) INJECTION
Status: DISCONTINUED | OUTPATIENT
Start: 2022-01-08 | End: 2022-01-14 | Stop reason: HOSPADM

## 2022-01-08 RX ORDER — ALBUTEROL SULFATE 90 UG/1
2 AEROSOL, METERED RESPIRATORY (INHALATION) EVERY 6 HOURS
Status: DISCONTINUED | OUTPATIENT
Start: 2022-01-08 | End: 2022-01-08

## 2022-01-08 RX ORDER — ARIPIPRAZOLE 5 MG/1
5 TABLET ORAL DAILY
Status: DISCONTINUED | OUTPATIENT
Start: 2022-01-08 | End: 2022-01-08

## 2022-01-08 RX ORDER — GLUCAGON 1 MG
1 KIT INJECTION
Status: DISCONTINUED | OUTPATIENT
Start: 2022-01-08 | End: 2022-01-14 | Stop reason: HOSPADM

## 2022-01-08 RX ORDER — FOLIC ACID 1 MG/1
1 TABLET ORAL DAILY
Status: DISCONTINUED | OUTPATIENT
Start: 2022-01-08 | End: 2022-01-14 | Stop reason: HOSPADM

## 2022-01-08 RX ORDER — ENOXAPARIN SODIUM 100 MG/ML
1 INJECTION SUBCUTANEOUS 2 TIMES DAILY
Status: DISCONTINUED | OUTPATIENT
Start: 2022-01-08 | End: 2022-01-08

## 2022-01-08 RX ORDER — DIAZEPAM 5 MG/1
5 TABLET ORAL EVERY 8 HOURS
Status: DISCONTINUED | OUTPATIENT
Start: 2022-01-08 | End: 2022-01-09

## 2022-01-08 RX ORDER — BENZONATATE 100 MG/1
200 CAPSULE ORAL 3 TIMES DAILY PRN
Status: DISCONTINUED | OUTPATIENT
Start: 2022-01-08 | End: 2022-01-14 | Stop reason: HOSPADM

## 2022-01-08 RX ORDER — LORAZEPAM 2 MG/ML
1 INJECTION INTRAMUSCULAR ONCE
Status: DISCONTINUED | OUTPATIENT
Start: 2022-01-08 | End: 2022-01-08

## 2022-01-08 RX ORDER — SODIUM CHLORIDE 0.9 % (FLUSH) 0.9 %
10 SYRINGE (ML) INJECTION EVERY 12 HOURS PRN
Status: DISCONTINUED | OUTPATIENT
Start: 2022-01-08 | End: 2022-01-08

## 2022-01-08 RX ORDER — GLUCAGON 1 MG
1 KIT INJECTION
Status: DISCONTINUED | OUTPATIENT
Start: 2022-01-08 | End: 2022-01-08

## 2022-01-08 RX ORDER — SODIUM CHLORIDE 0.9 % (FLUSH) 0.9 %
10 SYRINGE (ML) INJECTION
Status: DISCONTINUED | OUTPATIENT
Start: 2022-01-08 | End: 2022-01-14 | Stop reason: HOSPADM

## 2022-01-08 RX ORDER — INSULIN ASPART 100 [IU]/ML
0-5 INJECTION, SOLUTION INTRAVENOUS; SUBCUTANEOUS
Status: DISCONTINUED | OUTPATIENT
Start: 2022-01-08 | End: 2022-01-14 | Stop reason: HOSPADM

## 2022-01-08 RX ORDER — IBUPROFEN 200 MG
16 TABLET ORAL
Status: DISCONTINUED | OUTPATIENT
Start: 2022-01-08 | End: 2022-01-14 | Stop reason: HOSPADM

## 2022-01-08 RX ORDER — IBUPROFEN 200 MG
16 TABLET ORAL
Status: DISCONTINUED | OUTPATIENT
Start: 2022-01-08 | End: 2022-01-08

## 2022-01-08 RX ORDER — FLUTICASONE FUROATE AND VILANTEROL 100; 25 UG/1; UG/1
1 POWDER RESPIRATORY (INHALATION) DAILY
Status: DISCONTINUED | OUTPATIENT
Start: 2022-01-08 | End: 2022-01-14 | Stop reason: HOSPADM

## 2022-01-08 RX ORDER — DULOXETIN HYDROCHLORIDE 30 MG/1
30 CAPSULE, DELAYED RELEASE ORAL DAILY
Status: DISCONTINUED | OUTPATIENT
Start: 2022-01-09 | End: 2022-01-12

## 2022-01-08 RX ORDER — GABAPENTIN 400 MG/1
400 CAPSULE ORAL 3 TIMES DAILY
Status: DISCONTINUED | OUTPATIENT
Start: 2022-01-08 | End: 2022-01-08

## 2022-01-08 RX ORDER — ENOXAPARIN SODIUM 100 MG/ML
40 INJECTION SUBCUTANEOUS EVERY 24 HOURS
Status: DISCONTINUED | OUTPATIENT
Start: 2022-01-08 | End: 2022-01-08

## 2022-01-08 RX ORDER — DEXAMETHASONE SODIUM PHOSPHATE 4 MG/ML
6 INJECTION, SOLUTION INTRA-ARTICULAR; INTRALESIONAL; INTRAMUSCULAR; INTRAVENOUS; SOFT TISSUE
Status: COMPLETED | OUTPATIENT
Start: 2022-01-08 | End: 2022-01-08

## 2022-01-08 RX ORDER — ALBUTEROL SULFATE 90 UG/1
2 AEROSOL, METERED RESPIRATORY (INHALATION) EVERY 6 HOURS
Status: DISCONTINUED | OUTPATIENT
Start: 2022-01-08 | End: 2022-01-14 | Stop reason: HOSPADM

## 2022-01-08 RX ORDER — IBUPROFEN 200 MG
24 TABLET ORAL
Status: DISCONTINUED | OUTPATIENT
Start: 2022-01-08 | End: 2022-01-14 | Stop reason: HOSPADM

## 2022-01-08 RX ORDER — BENZONATATE 100 MG/1
100 CAPSULE ORAL 3 TIMES DAILY PRN
Status: DISCONTINUED | OUTPATIENT
Start: 2022-01-08 | End: 2022-01-08

## 2022-01-08 RX ORDER — PANTOPRAZOLE SODIUM 40 MG/1
40 TABLET, DELAYED RELEASE ORAL DAILY
Status: DISCONTINUED | OUTPATIENT
Start: 2022-01-09 | End: 2022-01-14 | Stop reason: HOSPADM

## 2022-01-08 RX ORDER — ACETAMINOPHEN 500 MG
1000 TABLET ORAL EVERY 8 HOURS PRN
Status: DISCONTINUED | OUTPATIENT
Start: 2022-01-08 | End: 2022-01-09

## 2022-01-08 RX ORDER — ASCORBIC ACID 500 MG
500 TABLET ORAL 2 TIMES DAILY
Status: DISCONTINUED | OUTPATIENT
Start: 2022-01-08 | End: 2022-01-14 | Stop reason: HOSPADM

## 2022-01-08 RX ORDER — LORAZEPAM 0.5 MG/1
2 TABLET ORAL EVERY 4 HOURS PRN
Status: DISCONTINUED | OUTPATIENT
Start: 2022-01-08 | End: 2022-01-09

## 2022-01-08 RX ORDER — ASCORBIC ACID 250 MG
500 TABLET ORAL 2 TIMES DAILY
Status: DISCONTINUED | OUTPATIENT
Start: 2022-01-08 | End: 2022-01-08

## 2022-01-08 RX ORDER — LEVOTHYROXINE SODIUM 25 UG/1
25 TABLET ORAL
Status: DISCONTINUED | OUTPATIENT
Start: 2022-01-09 | End: 2022-01-14 | Stop reason: HOSPADM

## 2022-01-08 RX ORDER — ENOXAPARIN SODIUM 100 MG/ML
1 INJECTION SUBCUTANEOUS 2 TIMES DAILY
Status: DISCONTINUED | OUTPATIENT
Start: 2022-01-08 | End: 2022-01-09

## 2022-01-08 RX ORDER — ONDANSETRON 2 MG/ML
4 INJECTION INTRAMUSCULAR; INTRAVENOUS
Status: COMPLETED | OUTPATIENT
Start: 2022-01-08 | End: 2022-01-08

## 2022-01-08 RX ORDER — TRAZODONE HYDROCHLORIDE 100 MG/1
100 TABLET ORAL NIGHTLY PRN
Status: DISCONTINUED | OUTPATIENT
Start: 2022-01-08 | End: 2022-01-09

## 2022-01-08 RX ORDER — IBUPROFEN 200 MG
24 TABLET ORAL
Status: DISCONTINUED | OUTPATIENT
Start: 2022-01-08 | End: 2022-01-08

## 2022-01-08 RX ADMIN — DIAZEPAM 5 MG: 5 TABLET ORAL at 05:01

## 2022-01-08 RX ADMIN — ARIPIPRAZOLE 5 MG: 5 TABLET ORAL at 03:01

## 2022-01-08 RX ADMIN — OXYCODONE HYDROCHLORIDE AND ACETAMINOPHEN 500 MG: 500 TABLET ORAL at 09:01

## 2022-01-08 RX ADMIN — ONDANSETRON 4 MG: 2 INJECTION INTRAMUSCULAR; INTRAVENOUS at 01:01

## 2022-01-08 RX ADMIN — REMDESIVIR 200 MG: 100 INJECTION, POWDER, LYOPHILIZED, FOR SOLUTION INTRAVENOUS at 03:01

## 2022-01-08 RX ADMIN — FOLIC ACID 1 MG: 1 TABLET ORAL at 03:01

## 2022-01-08 RX ADMIN — ALBUTEROL SULFATE 2 PUFF: 108 INHALANT RESPIRATORY (INHALATION) at 07:01

## 2022-01-08 RX ADMIN — DEXAMETHASONE SODIUM PHOSPHATE 6 MG: 4 INJECTION INTRA-ARTICULAR; INTRALESIONAL; INTRAMUSCULAR; INTRAVENOUS; SOFT TISSUE at 01:01

## 2022-01-08 RX ADMIN — FLUTICASONE FUROATE AND VILANTEROL TRIFENATATE 1 PUFF: 100; 25 POWDER RESPIRATORY (INHALATION) at 02:01

## 2022-01-08 RX ADMIN — GABAPENTIN 400 MG: 400 CAPSULE ORAL at 03:01

## 2022-01-08 RX ADMIN — ACETAMINOPHEN 1000 MG: 500 TABLET ORAL at 03:01

## 2022-01-08 RX ADMIN — INSULIN DETEMIR 2 UNITS: 100 INJECTION, SOLUTION SUBCUTANEOUS at 09:01

## 2022-01-08 RX ADMIN — ACETAMINOPHEN 1000 MG: 500 TABLET ORAL at 09:01

## 2022-01-08 RX ADMIN — SODIUM CHLORIDE 500 ML: 0.9 INJECTION, SOLUTION INTRAVENOUS at 01:01

## 2022-01-08 RX ADMIN — DIAZEPAM 5 MG: 5 TABLET ORAL at 09:01

## 2022-01-08 RX ADMIN — THERA TABS 1 TABLET: TAB at 03:01

## 2022-01-08 NOTE — ED PROVIDER NOTES
Encounter Date: 1/8/2022       History     Chief Complaint   Patient presents with    Shortness of Breath     COVID +, c/o SOB, sent from clinic, on 3L at home - currently 99% on 4L     61-year-old female with COPD (per patient), GERD, hyperlipidemia, hypertension, diabetes and depression which presents the emergency room with worsening shortness of breath over the past 3 days.  Patient states that she went to Urgent Care and tested positive for COVID and due to her low oxygen level they advised her to come to the emergency room.  Patient states that she usually wears 3 L of O2 at night but not during the day.  In the past 3 days she has had to use oxygen 24 hours a day.  Her  tested positive 3 days ago.    The history is provided by the patient.     Review of patient's allergies indicates:   Allergen Reactions    Lortab  [hydrocodone-acetaminophen] Itching    Promethazine Other (See Comments) and Itching     Other reaction(s): Itching     Past Medical History:   Diagnosis Date    Anemia     Anemia     Controlled type 2 diabetes mellitus without complication, without long-term current use of insulin 11/30/2021    Depression     Diverticulitis     Fatty liver     GERD (gastroesophageal reflux disease)     Hyperlipidemia     Hypertension     Pancreatitis     Peptic ulcer disease     Polysubstance abuse     Posterior reversible encephalopathy syndrome     Sarcoidosis of lung     Sarcoidosis of lung     over 30 yrs ago    Seizures     7/2017    Suicide attempt     Suicide ideation      Past Surgical History:   Procedure Laterality Date    APPENDECTOMY      BILATERAL MASTECTOMY Bilateral 10/29/2020    Procedure: MASTECTOMY, BILATERAL;  Surgeon: Baylee Kevin MD;  Location: [facility] OR [address] [place] [address];  Service: General;  Laterality: Bilateral;    BREAST REVISION SURGERY Bilateral 2/11/2021    Procedure: BREAST REVISION SURGERY;  Surgeon: Scottie Johnson MD;  Location: [facility] OR [address] [place] [address];   Service: Plastics;  Laterality: Bilateral;    COLONOSCOPY N/A 7/28/2017    Procedure: COLONOSCOPY;  Surgeon: Aaron Alvarado MD;  Location: [facility];  Service: Endoscopy;  Laterality: N/A;    ESOPHAGOGASTRODUODENOSCOPY  10/7/2016, 11/6/2014    2016 - gastritis, duodenitis, 2014 erosive gastritis    ESOPHAGOGASTRODUODENOSCOPY N/A 2/11/2020    Procedure: ESOPHAGOGASTRODUODENOSCOPY (EGD);  Surgeon: Fawn Garrido MD;  Location: [facility];  Service: Endoscopy;  Laterality: N/A;    ESOPHAGOGASTRODUODENOSCOPY N/A 4/19/2021    Procedure: EGD (ESOPHAGOGASTRODUODENOSCOPY);  Surgeon: Paramjit Martino MD;  Location: [facility];  Service: Endoscopy;  Laterality: N/A;    FLEXIBLE SIGMOIDOSCOPY  11/06/2014    colitis    HYSTERECTOMY      INJECTION FOR SENTINEL NODE IDENTIFICATION Right 10/29/2020    Procedure: INJECTION, FOR SENTINEL NODE IDENTIFICATION;  Surgeon: Baylee Kevin MD;  Location: [facility] OR [address] [place] [address];  Service: General;  Laterality: Right;    INJECTION OF JOINT Right 10/10/2019    Procedure: Injection, Joint RIGHT ILIOPSOAS BURSA/TENDON INJECTION AND RIGHT GLUTEAL TENDON INJECTION WITH STEROID AND LIDOCAINE;  Surgeon: Guillaume Rico MD;  Location: [facility];  Service: Pain Management;  Laterality: Right;  NEEDS CONSENT    INSERTION OF BREAST TISSUE EXPANDER Bilateral 10/29/2020    Procedure: INSERTION, TISSUE EXPANDER, BREAST;  Surgeon: Scottie Johnson MD;  [facility];  Service: Plastics;  Laterality: Bilateral;  Right breast: 1082 g  Left breast: 1076 g    LIPOSUCTION Bilateral 2/11/2021    Procedure: LIPOSUCTION;  Surgeon: Scottie Johnson MD;  [facility];  Service: Plastics;  Laterality: Bilateral;    mediastenoscopy      REPLACEMENT OF IMPLANT OF BREAST Bilateral 2/11/2021    Procedure: REPLACEMENT, IMPLANT, BREAST;  Surgeon: Scottie Johnson MD;  [facility];  Service: Plastics;  Laterality: Bilateral;    SENTINEL LYMPH NODE BIOPSY  Right 10/29/2020    Procedure: BIOPSY, LYMPH NODE, SENTINEL;  Surgeon: Baylee Kevin MD;  Location: [facility] OR [address] [place] [address];  Service: General;  Laterality: Right;    TONSILLECTOMY N/A     TUBAL LIGATION       Family History   Problem Relation Age of Onset    Heart attack Father     Diabetes Father     Hypertension Father     Diabetes Mother     Hypertension Mother     Breast cancer Maternal Aunt     Colon cancer Maternal Uncle     Breast cancer Daughter     Ovarian cancer Neg Hx     Cancer Neg Hx      Social History     Tobacco Use    Smoking status: Current Every Day Smoker     Packs/day: 0.50     Years: 30.00     Pack years: 15.00     Types: Cigarettes     Last attempt to quit: 2021     Years since quittin.9    Smokeless tobacco: Never Used   Substance Use Topics    Alcohol use: Yes     Comment: daily     Drug use: Yes     Types: Marijuana     Comment: currently     Review of Systems   Constitutional: Negative for fever.   HENT: Positive for congestion. Negative for sore throat.    Respiratory: Positive for cough, chest tightness, shortness of breath and wheezing.    Cardiovascular: Negative for chest pain.   Gastrointestinal: Negative for nausea.   Genitourinary: Negative for dysuria.   Musculoskeletal: Negative for back pain.   Skin: Negative for rash.   Neurological: Negative for weakness.   Hematological: Does not bruise/bleed easily.   All other systems reviewed and are negative.    Physical Exam     Initial Vitals [22 1106]   BP Pulse Resp Temp SpO2   138/70 90 20 98.5 °F (36.9 °C) 99 %      MAP       --         Physical Exam    Nursing note and vitals reviewed.  Constitutional: She appears well-developed and well-nourished.   HENT:   Head: Normocephalic and atraumatic.   Eyes: Conjunctivae and EOM are normal. Pupils are equal, round, and reactive to light.   Neck:   Normal range of motion.  Cardiovascular: Normal rate, regular rhythm, normal heart sounds and intact distal pulses.  Exam reveals no gallop and no friction rub.    No murmur heard.  Pulmonary/Chest: She has wheezes in the right upper field and the left upper field. She has rhonchi. She has rales in the right middle field.   Abdominal: Abdomen is soft. Bowel sounds are normal. She exhibits no distension and no mass. There is no abdominal tenderness. There is no rebound and no guarding.   Musculoskeletal:         General: Normal range of motion.      Cervical back: Normal range of motion.     Neurological: She is alert and oriented to person, place, and time. She has normal strength. GCS score is 15. GCS eye subscore is 4. GCS verbal subscore is 5. GCS motor subscore is 6.   Skin: Skin is warm. Capillary refill takes less than 2 seconds.       ED Course   Procedures  Labs Reviewed   CBC W/ AUTO DIFFERENTIAL - Abnormal; Notable for the following components:       Result Value    MCH 26.4 (*)     MCHC 30.5 (*)     RDW 16.0 (*)     Platelets 82 (*)     Platelet Estimate Decreased (*)     All other components within normal limits   COMPREHENSIVE METABOLIC PANEL - Abnormal; Notable for the following components:    CO2 22 (*)     AST 49 (*)     ALT 48 (*)     All other components within normal limits   C-REACTIVE PROTEIN - Abnormal; Notable for the following components:    CRP 14.1 (*)     All other components within normal limits   LACTIC ACID, PLASMA - Abnormal; Notable for the following components:    Lactate (Lactic Acid) 2.6 (*)     All other components within normal limits   CULTURE, BLOOD   CULTURE, BLOOD   FERRITIN   LACTATE DEHYDROGENASE   CK   TROPONIN I   PROCALCITONIN   APTT   PROTIME-INR   D DIMER, QUANTITATIVE   TSH   B-TYPE NATRIURETIC PEPTIDE   LACTIC ACID, PLASMA   TSH   POCT GLUCOSE MONITORING CONTINUOUS          Imaging Results          X-Ray Chest AP Portable (Final result)  Result time 01/08/22 13:33:18    Final result by James Sotelo DO (01/08/22 13:33:18)                 Impression:      Please see  above      Electronically signed by: James Sotelo DO  Date:    01/08/2022  Time:    13:33             Narrative:    EXAMINATION:  XR CHEST AP PORTABLE    CLINICAL HISTORY:  COVID-19;    TECHNIQUE:  Single frontal view of the chest was performed.    COMPARISON:  01/08/2022    FINDINGS:  No significant change from prior.  Continued surgical clips overlying the cardiomediastinal silhouette and lower chest wall.  There is no new lung opacity allowing for differences in technique.  No large pleural effusion or pneumothorax.  Continued atherosclerotic aorta.  Clinical correlation and follow-up advised.                                 Medications   remdesivir 200 mg in sodium chloride 0.9% 250 mL infusion (200 mg Intravenous New Bag 1/8/22 5963)     Followed by   remdesivir 100 mg in sodium chloride 0.9% 250 mL infusion (has no administration in time range)   sodium chloride 0.9% flush 10 mL (has no administration in time range)   naloxone 0.4 mg/mL injection 0.02 mg (has no administration in time range)   glucose chewable tablet 16 g (has no administration in time range)   glucose chewable tablet 24 g (has no administration in time range)   dextrose 50% injection 12.5 g (has no administration in time range)   dextrose 50% injection 25 g (has no administration in time range)   glucagon (human recombinant) injection 1 mg (has no administration in time range)   enoxaparin injection 40 mg (has no administration in time range)   insulin aspart U-100 pen 0-5 Units (has no administration in time range)   insulin detemir U-100 pen 2 Units (has no administration in time range)   albuterol inhaler 2 puff (has no administration in time range)   ascorbic acid (vitamin C) tablet 500 mg (has no administration in time range)   dexAMETHasone tablet 6 mg (has no administration in time range)   fluticasone furoate-vilanteroL 100-25 mcg/dose diskus inhaler 1 puff (1 puff Inhalation Given 1/8/22 8155)   ARIPiprazole tablet 5 mg (has no  administration in time range)   gabapentin capsule 400 mg (400 mg Oral Given 1/8/22 1510)   levothyroxine tablet 25 mcg (has no administration in time range)   traZODone tablet 100 mg (has no administration in time range)   multivitamin tablet (1 tablet Oral Given 1/8/22 1510)   folic acid tablet 1 mg (1 mg Oral Given 1/8/22 1510)   LORazepam tablet 2 mg (has no administration in time range)   pantoprazole EC tablet 40 mg (has no administration in time range)   acetaminophen tablet 1,000 mg (has no administration in time range)   benzonatate capsule 100 mg (has no administration in time range)   dexamethasone injection 6 mg (6 mg Intravenous Given 1/8/22 1347)   ondansetron injection 4 mg (4 mg Intravenous Given 1/8/22 1353)   sodium chloride 0.9% bolus 500 mL (0 mLs Intravenous Stopped 1/8/22 1453)     Medical Decision Making:   History:   Old Medical Records: I decided to obtain old medical records.  Old Records Summarized: records from clinic visits.       <> Summary of Records: Urgent care today with a positive COVID and chest x-ray with poor inspiratory effort  Initial Assessment:   60 y/o female which presents to the ED with worsening SOB over the past 3 days. COVID positive this am. Labs, CXR and EKG pending.  Differential Diagnosis:   Pneumonia, pleural effusion, COPD exacerbation, MI, CHF, oxygen dependency   Clinical Tests:   Lab Tests: Ordered and Reviewed  Radiological Study: Ordered and Reviewed  Medical Tests: Ordered and Reviewed  ED Management:  Pt examined and noted to have intermittent wheezing to the upper lobes and scattered rales. Decadron given while in the ED along with zofran for nausea. Pt currently requiring 4L NC and will be admitted to the hospital for COVID complication treatment.   Other:   I have discussed this case with another health care provider.       <> Summary of the Discussion: Hospital Medicine Dr. Parker for admission- pt admitted to Dr. Proctor             ED Course as of  01/08/22 1533   Sat Jan 08, 2022   1111 BP: 138/70 [AT]   1111 Temp: 98.5 °F (36.9 °C) [AT]   1111 Temp src: Oral [AT]   1111 Pulse: 90 [AT]   1111 Resp: 20 [AT]   1111 SpO2: 99 % [AT]   1241 CRP(!): 14.1 [AT]      ED Course User Index  [AT] DIEGO Hernandez             Clinical Impression:   Final diagnoses:  [U07.1] COVID-19          ED Disposition Condition    Admit               DIEGO Hernandez  01/08/22 1540

## 2022-01-08 NOTE — ASSESSMENT & PLAN NOTE
On admission glucose 91    - Monitor closely in the setting of dexamethasone  - On LDSS and 2 units of detemir- will adjust as needed

## 2022-01-08 NOTE — ED NOTES
Earl Abdul, a 61 y.o. female presents to the ED via EMS with CC progressive worsening of SOB x1wk. Pt w/ hx of COPD, normally on 3L of oxygen at home states that she has been satting 91%, has had to recently titrate her oxygen up at home d/t increased SOB. Satting 98% on 4L      Patient identifiers verified verbally with pt and correct for Earl Abdul.    LOC/ APPEARANCE: The patient is AAOx4; appears fatigued and weak. Pt is speaking and following commands appropriately. Pt changed into hospital gown. Continuous cardiac monitor, pulse ox, and BP cuff applied to pt. Pt is clean and well groomed. Pt updated on POC. Bed low and locked with side rails up x2, call light in pt reach.  SKIN: Skin is warm dry and intact. No visible breakdown or brusing noted to extremities. Mucus membranes dry. Well-healed past surgical scars noted to breasts bilaterally d/t past double mastectomy per pt.   RESPIRATORY: Airway is open and patent. Hx of COPD, endorses progressive worsening of SOB. Satting 98% on 4L of O2 via NC. Expiratory wheezing noted upon examination. No accessory muscle use noted.  CARDIAC: Regular HR displayed on cardiac monitor - EKG obtained. No peripheral edema noted, and pt has no c/o chest pain.   ABDOMEN: Soft and non-tender to palpation with no distention noted. Pt endorses diarrheal episodes x2 days; denies blood in stool.  NEUROLOGIC: Eyes open spontaneously and facial expression symmetrical. Pt reports sensation present in all extremities when touched with a finger, denies any numbness or tingling.   MUSCULOSKELETAL: Active ROM noted to bue and ble - pt ambulates independently. Positive for myalagias and generalized weakness.  : Pt able to void independently; no c/o dysuria, urinary frequency, or blood in urine.

## 2022-01-08 NOTE — ASSESSMENT & PLAN NOTE
Will monitor for signs of withdrawal. History of alcohol withdrawal in the past. Endorses daily drinking.    Tremulous and tachycardic on exam. States she feels like she is going to withdraw.     - Valium 10 q6  - Ciwa scale  - Benzos as needed if signs of withdraw  - Addiction psych consult- recs appreciated  - Will monitor for potential escalation

## 2022-01-08 NOTE — ED TRIAGE NOTES
61 y.o. female arrived to the ED via EMS for c/o SOB. Pt w/ hx of COPD endorses progressive worsening of SOB x1 wk, as well as generalized body aches, productive cough, nausea, and muscle spasms below breasts bilaterally. Denies fever, chills, or chest pain. Tested COVID+ today, 1/7/21.

## 2022-01-08 NOTE — PROGRESS NOTES
Problem: Breastfeeding  Goal: Effective Breastfeeding  Outcome: Ongoing, Progressing     Problem: Adjustment to Role Transition (Postpartum  Delivery)  Goal: Successful Maternal Role Transition  Outcome: Ongoing, Progressing     Problem: Bleeding (Postpartum  Delivery)  Goal: Hemostasis  Outcome: Ongoing, Progressing     Problem: Infection (Postpartum  Delivery)  Goal: Absence of Infection Signs and Symptoms  Outcome: Ongoing, Progressing     Problem: Pain (Postpartum  Delivery)  Goal: Acceptable Pain Control  Outcome: Ongoing, Progressing  Intervention: Prevent or Manage Pain  Recent Flowsheet Documentation  Taken 2022 1715 by Enma Inman, RN  Pain Management Interventions: see MAR  Taken 2022 0900 by Enma Inman, RN  Pain Management Interventions: no interventions per patient request     Problem: Postoperative Nausea and Vomiting (Postpartum  Delivery)  Goal: Nausea and Vomiting Relief  Outcome: Ongoing, Progressing     Problem: Postoperative Urinary Retention (Postpartum  Delivery)  Goal: Effective Urinary Elimination  Outcome: Ongoing, Progressing   Goal Outcome Evaluation:                  Subjective:       Patient ID: Earl Abdul is a 60 y.o. female.    Chief Complaint: Sleeping Problem    HPI     I had the pleasure of seeing Earl Abdul today, who is a 60 y.o. female that presents with interrupted sleep.    Earl Abdul does not have a CDL.    Earl Abdul is not a shift worker.    Earl Abdul presents with has difficulty falling asleep and observed apnea that has been going on for 12 months    Bedtime when working ranges from NA to NA.   When not working, bedtime ranges from 2100 to 2200.   Sleep latency ranges from 30 to 60 minutes.   Average number of awakenings is 1-2 and return to sleep is prolonged.   Wake up time when working is NA to NA.   When not working, wake up time is 0400 to 0500.   Patient does not feel rested upon awakening.    Earl Abdul consumes approximately 0 beverages with caffeine are consumed daily.   An average of 3 beverages with alcohol are consumed weekly   Medications taken for sleep currently: trazodone, abilify  Previous medications taken: none     Earl Abdul does experience daytime sleepiness.   Naps are taken about 0 times weekly, usually lasting NA to NA minutes.  Earl currently does not operate an automobile.  Earl Abdul NA experience drowsiness when driving.   Patient does doze off when sedentary.   Earl Abdul does not have auxiliary symptoms of narcolepsy including sleep onset paralysis, hypnagogic hallucinations, sleep attacks and cataplexy    EPWORTH SLEEPINESS SCALE 6/2/2020   Sitting and reading 2   Watching TV 3   Sitting, inactive in a public place (e.g. a theatre or a meeting) 1   As a passenger in a car for an hour without a break 3   Lying down to rest in the afternoon when circumstances permit 3   Sitting and talking to someone 1   Sitting quietly after a lunch without alcohol 3   In a car, while stopped for a few minutes in traffic 0   Total score 16       Earl Abdul has a history of  snoring.   Snoring is described as moderate and constant.   Apneic episodes have been noticed during sleep.   A witness to sleep is present.   The patient awakens with mouth dryness.      Earl Abdul does not not have symptoms of Restless Legs Syndrome. Nocturnal leg movements have been noticed.   The patient does not experience sleep related leg cramps.   There is a history of parasomnia including sleep walking and sleep talking.      Current Outpatient Medications:     acetaminophen (TYLENOL) 325 MG tablet, Take 2 tablets (650 mg total) by mouth every 6 (six) hours as needed., Disp: , Rfl: 0    ARIPiprazole (ABILIFY) 2 MG Tab, , Disp: , Rfl:     dicyclomine (BENTYL) 20 mg tablet, 10 mg. , Disp: , Rfl:     dronabinol (MARINOL) 2.5 MG capsule, Take 1 capsule (2.5 mg total) by mouth 2 (two) times daily before meals., Disp: 20 capsule, Rfl: 0    gabapentin (NEURONTIN) 600 MG tablet, , Disp: , Rfl:     HYDROcodone-acetaminophen (NORCO)  mg per tablet, , Disp: , Rfl:     ipratropium (ATROVENT HFA) 17 mcg/actuation inhaler, Inhale 2 puffs into the lungs every 6 (six) hours. Rescue for 14 days, Disp: 1 Inhaler, Rfl: 0    levothyroxine (SYNTHROID) 25 MCG tablet, Take 1 tablet (25 mcg total) by mouth before breakfast., Disp: 30 tablet, Rfl: 0    omeprazole (PRILOSEC) 10 MG capsule, Take 20 mg by mouth once daily. , Disp: , Rfl:     ondansetron (ZOFRAN ODT) 8 MG TbDL, Take 1 tablet (8 mg total) by mouth 3 (three) times daily as needed (for nausea and vomiting)., Disp: 30 tablet, Rfl: 0    predniSONE (DELTASONE) 10 MG tablet, , Disp: , Rfl:     rOPINIRole (REQUIP) 0.25 MG tablet, , Disp: , Rfl:     traZODone (DESYREL) 100 MG tablet, TK 3 TS PO HS, Disp: , Rfl: 1    albuterol (PROVENTIL/VENTOLIN HFA) 90 mcg/actuation inhaler, Inhale 2 puffs into the lungs every 4 (four) hours as needed for Wheezing or Shortness of Breath. Rescue, Disp: 18 g, Rfl: 0    albuterol (VENTOLIN HFA) 90 mcg/actuation  inhaler, Inhale 2 puffs into the lungs every 6 (six) hours as needed for Wheezing. Rescue, Disp: 18 g, Rfl: 0    amLODIPine (NORVASC) 10 MG tablet, Take 1 tablet (10 mg total) by mouth once daily., Disp: 30 tablet, Rfl: 0    azithromycin (Z-DENVER) 250 MG tablet, Take 2 tablets by mouth on day 1; Take 1 tablet by mouth on days 2-5, Disp: 6 tablet, Rfl: 0    benzonatate (TESSALON) 200 MG capsule, Take 1 capsule (200 mg total) by mouth 3 (three) times daily as needed for Cough., Disp: 30 capsule, Rfl: 0    cyclobenzaprine (FLEXERIL) 10 MG tablet, Take 10 mg by mouth 3 (three) times daily as needed for Muscle spasms., Disp: , Rfl:     FLUoxetine 10 MG capsule, Take 1 capsule (10 mg total) by mouth every evening. (Patient taking differently: Take 20 mg by mouth every evening. ), Disp: 30 capsule, Rfl: 11    folic acid-vit B6-vit B12 2.5-25-2 mg (FOLBIC OR EQUIV) 2.5-25-2 mg Tab, Take 1 tablet by mouth once daily., Disp: , Rfl:     haloperidol (HALDOL) 2 MG tablet, Take 1 tablet (2 mg total) by mouth 2 (two) times daily as needed., Disp: 10 tablet, Rfl: 0    LORazepam (ATIVAN) 0.5 MG tablet, Take 1 tablet (0.5 mg total) by mouth every 6 (six) hours as needed for Anxiety., Disp: 5 tablet, Rfl: 0    losartan (COZAAR) 25 MG tablet, , Disp: , Rfl:     oxyCODONE-acetaminophen (PERCOCET)  mg per tablet, , Disp: , Rfl:      Review of patient's allergies indicates:   Allergen Reactions    Fentanyl Other (See Comments)     Other reaction(s): Itching    Lortab  [hydrocodone-acetaminophen] Other (See Comments)    Phenothiazines          Past Medical History:   Diagnosis Date    Anemia     Anemia     Depression     Diverticulitis     Fatty liver     GERD (gastroesophageal reflux disease)     Hyperlipidemia     Hypertension     Pancreatitis     Peptic ulcer disease     Polysubstance abuse     Posterior reversible encephalopathy syndrome     Sarcoidosis of lung     Sarcoidosis of lung     over 30 yrs ago     Seizures     7/2017    Suicide attempt     Suicide ideation        Past Surgical History:   Procedure Laterality Date    APPENDECTOMY      COLONOSCOPY N/A 7/28/2017    Procedure: COLONOSCOPY;  Surgeon: Aaron Alvarado MD;  Location: [facility] ENDO;  Service: Endoscopy;  Laterality: N/A;    ESOPHAGOGASTRODUODENOSCOPY  10/7/2016, 11/6/2014    2016 - gastritis, duodenitis, 2014 erosive gastritis    ESOPHAGOGASTRODUODENOSCOPY N/A 2/11/2020    Procedure: ESOPHAGOGASTRODUODENOSCOPY (EGD);  Surgeon: Fawn Garrido MD;  Location: [facility] ENDO;  Service: Endoscopy;  Laterality: N/A;    FLEXIBLE SIGMOIDOSCOPY  11/06/2014    colitis    HYSTERECTOMY      INJECTION OF JOINT Right 10/10/2019    Procedure: Injection, Joint RIGHT ILIOPSOAS BURSA/TENDON INJECTION AND RIGHT GLUTEAL TENDON INJECTION WITH STEROID AND LIDOCAINE;  Surgeon: Guillaume Rioc MD;  Location: [facility] PAIN MGT;  Service: Pain Management;  Laterality: Right;  NEEDS CONSENT    mediastenoscopy      TONSILLECTOMY N/A 1970    TUBAL LIGATION         Family History   Problem Relation Age of Onset    Heart attack Father     Diabetes Father     Hypertension Father     Diabetes Mother     Hypertension Mother     Breast cancer Maternal Aunt     Colon cancer Maternal Uncle     Breast cancer Daughter     Ovarian cancer Neg Hx        Social History     Socioeconomic History    Marital status:      Spouse name: Not on file    Number of children: Not on file    Years of education: Not on file    Highest education level: Not on file   Occupational History    Not on file   Social Needs    Financial resource strain: Not on file    Food insecurity:     Worry: Not on file     Inability: Not on file    Transportation needs:     Medical: Not on file     Non-medical: Not on file   Tobacco Use    Smoking status: Current Every Day Smoker     Packs/day: 1.00     Years: 30.00     Pack years: 30.00     Types: Cigarettes    Smokeless tobacco: Never Used    Substance and Sexual Activity    Alcohol use: Yes    Drug use: No    Sexual activity: Yes     Birth control/protection: Surgical   Lifestyle    Physical activity:     Days per week: Not on file     Minutes per session: Not on file    Stress: Not on file   Relationships    Social connections:     Talks on phone: Not on file     Gets together: Not on file     Attends Sikh service: Not on file     Active member of club or organization: Not on file     Attends meetings of clubs or organizations: Not on file     Relationship status: Not on file   Other Topics Concern    Not on file   Social History Narrative    Not on file           Old medical records.    Vitals:    06/03/20 0821   BP: 126/77   Pulse: 86              The patient was given open opportunity to ask questions and/or express concerns about treatment plan.   All questions/concerns were discussed.   Driving precautions were provided.     Two patient identifiers used prior to evaluation.    Thank you for referring Earl Abdul for evaluation.           Review of Systems    Objective:      Physical Exam    Assessment:       1. Alcohol dependence, continuous    2. Essential hypertension    3. Fibromyalgia    4. Alcoholic cirrhosis of liver without ascites    5. New onset seizure    6. Posterior reversible encephalopathy syndrome    7. Snoring    8. Sleep walking    9. Sleep apnea, unspecified type        Plan:       Due to listed symptoms, a polysomnogram is recommended and ordered.   Description of procedure given to patient.   If significant Obstructive Sleep Apnea (VINICIO) is found during the initial portion of the study, therapy will be initiated with nasal Continuous Positive Airway Pressure (CPAP).   Goals of therapy were discussed, alternative treatments listed and patient agrees to this form of therapy if indicated.   The pathophysiology of VINICIO was discussed.   The effects of VINICIO on patient's co-morbid conditions and the increased  morbidity and/or mortality associated with this condition were reviewed.   The patient was given open opportunity to ask questions and/or express concerns about treatment plan.   All questions/concerns were discussed.   Driving precautions were provided.         31-minute visit. >50% spent counseling patient and coordination of care.    Thank you for referring Earl Kit Chantell for evaluation.

## 2022-01-08 NOTE — PROGRESS NOTES
Subjective:       Patient ID: Earl Abdul is a 61 y.o. female.    Vitals:  weight is 88.5 kg (195 lb). Her temperature is 98.5 °F (36.9 °C). Her blood pressure is 143/83 (abnormal) and her pulse is 91. Her oxygen saturation is 85% (abnormal).     Chief Complaint: Shortness of Breath    Pt is complaining of shortness of breath and congestion. She can't get her oxygen above 90%. Her symptoms have been going on for a few weeks now. States that she has hx of COPD and is on home oxygen. Usually 3L at night, but has increased usage to daily.  tested positive for COVID recently.     Cough  This is a new problem. The current episode started 1 to 4 weeks ago. Associated symptoms include headaches, myalgias, nasal congestion, postnasal drip and shortness of breath. Pertinent negatives include no chest pain, ear congestion, ear pain, fever, sore throat, sweats, weight loss or wheezing. She has tried nothing for the symptoms. The treatment provided no relief. Her past medical history is significant for COPD.       Constitution: Positive for fatigue. Negative for fever.   HENT: Positive for congestion and postnasal drip. Negative for ear pain and sore throat.    Cardiovascular: Negative for chest pain.   Respiratory: Positive for chest tightness, cough, COPD and shortness of breath. Negative for wheezing.    Musculoskeletal: Positive for muscle ache.   Neurological: Positive for headaches.       Objective:      Physical Exam   Constitutional: She is oriented to person, place, and time. She appears well-developed and well-nourished. She is cooperative.  Non-toxic appearance. She does not have a sickly appearance. She does not appear ill. No distress.   HENT:   Head: Normocephalic and atraumatic.   Ears:   Right Ear: Hearing, tympanic membrane, external ear and ear canal normal.   Left Ear: Hearing, tympanic membrane, external ear and ear canal normal.   Nose: Nose normal. No mucosal edema, rhinorrhea or nasal  deformity. No epistaxis. Right sinus exhibits no maxillary sinus tenderness and no frontal sinus tenderness. Left sinus exhibits no maxillary sinus tenderness and no frontal sinus tenderness.   Mouth/Throat: Uvula is midline, oropharynx is clear and moist and mucous membranes are normal. No trismus in the jaw. Normal dentition. No uvula swelling. No oropharyngeal exudate, posterior oropharyngeal edema or posterior oropharyngeal erythema.   Eyes: Conjunctivae and lids are normal. No scleral icterus.   Neck: Trachea normal and phonation normal. Neck supple. No edema present. No erythema present. No neck rigidity present.   Cardiovascular: Normal rate, regular rhythm, normal heart sounds, intact distal pulses and normal pulses.   Pulmonary/Chest: Effort normal. No tachypnea. No respiratory distress. She has no decreased breath sounds. She has wheezes. She has no rhonchi.   Abdominal: Normal appearance.   Musculoskeletal: Normal range of motion.         General: No deformity or edema. Normal range of motion.   Neurological: She is alert and oriented to person, place, and time. She exhibits normal muscle tone. Coordination normal.   Skin: Skin is warm, dry, intact, not diaphoretic and not pale.   Psychiatric: She has a normal mood and affect. Her speech is normal and behavior is normal. Judgment and thought content normal. Cognition and memory  Nursing note and vitals reviewed.        Results for orders placed or performed in visit on 01/08/22   POCT COVID-19 Rapid Screening   Result Value Ref Range    POC Rapid COVID Positive (A) Negative     Acceptable Yes    XR CHEST PA AND LATERAL    Result Date: 1/8/2022  EXAMINATION: XR CHEST PA AND LATERAL CLINICAL HISTORY: Shortness of breath TECHNIQUE: PA and lateral views of the chest were performed. COMPARISON: 12/09/2021 FINDINGS: Smaller lung volumes likely to poor inspiratory effort.  Scattered surgical clips overlying the mediastinum in the lower lobes again  seen.  There is no lung consolidation.  No pleural effusion or pneumothorax.  Cardiomediastinal silhouette relatively stable without evidence for central pulmonary vascular congestion.  Continued atherosclerotic aorta.  Degenerative change in the visualized spine.  Further evaluation as warranted clinically.     See above Electronically signed by: James Sotelo DO Date:    01/08/2022 Time:    10:01    Assessment:       1. COVID-19 virus detected    2. Shortness of breath    3. Low oxygen saturation    4. History of COPD          Plan:         COVID-19 virus detected    Shortness of breath  -     POCT COVID-19 Rapid Screening  -     XR CHEST PA AND LATERAL; Future; Expected date: 01/08/2022  -     Refer to Emergency Dept.    Low oxygen saturation  -     Refer to Emergency Dept.    History of COPD  -     Refer to Emergency Dept.           Medical Decision Making:   History:   Old Medical Records: I decided to obtain old medical records.  Old Records Summarized: records from clinic visits.  Independently Interpreted Test(s):   I have ordered and independently interpreted X-rays - see summary below.  Clinical Tests:   Lab Tests: Ordered and Reviewed       <> Summary of Lab: Covid positive     Additional MDM:   Differential Diagnosis: The follow diagnoses were considered, but were felt to be of low probability: Chest Pain and Costochondritis. The follow diagnoses were considered, but were felt to be of low probability: Pneumonia.     X-Rays: Chest X-Ray - Independent Interpretation - Heart size normal, Lungs clear, No acute abnormality., I have independently interpreted X-Ray(s) - see notes.     ALEXX Score:   Age over 65:                                    ___   > or = to 3 CAD risk factors:           ___  Established CAD:                            ___  > or = to 2 anginal events in the past 24 hours: ___  Use of ASA in past 7 days:              ___  Elevated Enzymes:                         ___  ST Depression > or = to  0.05 mV:  ___    Heart Failure Score:   COPD = Yes

## 2022-01-08 NOTE — ASSESSMENT & PLAN NOTE
Patient tested positive for covid. Is at a higher 02 requirement then at baseline    -Remdesevir Dexamethasone  -Supportive care  -Daily CBC  -Home monitoring at discharge

## 2022-01-08 NOTE — ASSESSMENT & PLAN NOTE
Patient with significant past psychiatric history including SI SA polysubstance abuse and severe alcohol withdrawal. Will continue to monitor patient's affect closely while admitted  Per patient only taking trazadone and duloxetine      Trazadone 100mg nightly  Home dose of Duloxetine  Consider consult to psych for complex medication management

## 2022-01-08 NOTE — ASSESSMENT & PLAN NOTE
Per previous oncology note    Mammogram on September 4, 2020 showed a focal asymmetry in the retroareolar region of the right breast.  Ultrasound at that time showed a 9 x 5 x 8 mm hypoechoic mass at 12:00 p.m..     Biopsy on September 29, 2020 showed grade 2 infiltrating ductal carcinoma (histologic grade 3, nuclear grade 2, mitotic index 2).  Tumor was 95% ER positive 20-30% MA positive and HER2 was 2+ but negative by FISH.  Ki-67 was 80%.     On October 29, 2020 bilateral mastectomy and right axillary sentinel lymph node biopsy was performed.  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  Margins were negative with closest margin 6 mm.  The sentinel lymph node was negative.    Final pathological staging T1b N0 stage IA.     Letrozole started in February 2021.    No longer taking femara per patient

## 2022-01-08 NOTE — ASSESSMENT & PLAN NOTE
She has hx of Sarcoid diagnosed about 41 years ago, she had erythema nodosum, arthralgias, mediastinal lymphadenopathy (no respiratory symptoms). She had mediastinoscopy with biopsy that revealed diagnosis of sarcoid. She was initially treated with prednisone for about 6 months and went into remission until 7 years ago she had recurrence of arthralgias and e nodosum and was treated by primary care doctor with steroids for a couple of months. Since this time she hasn't had any flares. No hx of uveitis or other sarcoid complications.     Per EMR last seen in 2015 by Rheumatology    - Consider Rheumatology consult  - No recent flares

## 2022-01-08 NOTE — H&P
[facility]  [facility]  History & Physical    Patient Name: Earl Abdul  MRN: [id number]  Patient Class: IP- Inpatient  Admission Date: 1/8/2022  Attending Physician: Christen Proctor MD   Primary Care Provider: Andrew Rodriguez MD         Patient information was obtained from patient and ER records.     Subjective:     Principal Problem:Acute hypoxemic respiratory failure    Chief Complaint:   Chief Complaint   Patient presents with    Shortness of Breath     COVID +, c/o SOB, sent from clinic, on 3L at home - currently 99% on 4L        HPI:     61-year-old female with COPD (per patient), GERD, hyperlipidemia, hypertension, diabetes alcohol withdrawal and depression which presents the emergency room with worsening shortness of breath over the past 3 days.  Patient states that she went to Urgent Care and tested positive for COVID and due to her low oxygen level they advised her to come to the emergency room.  Patient states that she usually wears 3 L of O2 at night but not during the day.  The patient also endorses nausea and dry heaving. In the past 3 days she has had to use oxygen 24 hours a day.  Her  tested positive 3 days ago. On interview the patient endorses a significant recent history of daily alcohol consumption with last drink being approximately 24 hours ago    Per urgent care  The current episode started 1 to 4 weeks ago. Associated symptoms include headaches, myalgias, nasal congestion, postnasal drip and shortness of breath. Pertinent negatives include no chest pain, ear congestion, ear pain, fever, sore throat, sweats, weight loss or wheezing. She has tried nothing for the symptoms. The treatment provided no relief. Her past medical history is significant for COPD.     CXR No significant change from prior.  Continued surgical clips overlying the cardiomediastinal silhouette and lower chest wall.  There is no new lung opacity allowing for differences in technique.   No large pleural effusion or pneumothorax.  Continued atherosclerotic aorta.  Clinical correlation and follow-up advised.    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 Current drinker with history of alcohol withdrawal      Past Medical History:   Diagnosis Date    Anemia     Anemia     Controlled type 2 diabetes mellitus without complication, without long-term current use of insulin 11/30/2021    Depression     Diverticulitis     Fatty liver     GERD (gastroesophageal reflux disease)     Hyperlipidemia     Hypertension     Pancreatitis     Peptic ulcer disease     Polysubstance abuse     Posterior reversible encephalopathy syndrome     Sarcoidosis of lung     Sarcoidosis of lung     over 30 yrs ago    Seizures     7/2017    Suicide attempt     Suicide ideation        Past Surgical History:   Procedure Laterality Date    APPENDECTOMY      BILATERAL MASTECTOMY Bilateral 10/29/2020    Procedure: MASTECTOMY, BILATERAL;  Surgeon: Baylee Kevin MD;  Location: [facility] OR [address] [place] [address];  Service: General;  Laterality: Bilateral;    BREAST REVISION SURGERY Bilateral 2/11/2021    Procedure: BREAST REVISION SURGERY;  Surgeon: Scottie Johnson MD;  Location: [facility] OR [address] [place] [address];  Service: Plastics;  Laterality: Bilateral;    COLONOSCOPY N/A 7/28/2017    Procedure: COLONOSCOPY;  Surgeon: Aaron Alvarado MD;  Location: [facility];  Service: Endoscopy;  Laterality: N/A;    ESOPHAGOGASTRODUODENOSCOPY  10/7/2016, 11/6/2014    2016 - gastritis, duodenitis, 2014 erosive gastritis    ESOPHAGOGASTRODUODENOSCOPY N/A 2/11/2020    Procedure: ESOPHAGOGASTRODUODENOSCOPY (EGD);  Surgeon: Fawn Garrido MD;  Location: [facility];  Service:  Endoscopy;  Laterality: N/A;    ESOPHAGOGASTRODUODENOSCOPY N/A 4/19/2021    Procedure: EGD (ESOPHAGOGASTRODUODENOSCOPY);  Surgeon: Paramjit Martino MD;  Location: [facility];  Service: Endoscopy;  Laterality: N/A;    FLEXIBLE SIGMOIDOSCOPY  11/06/2014    colitis    HYSTERECTOMY      INJECTION FOR SENTINEL NODE IDENTIFICATION Right 10/29/2020    Procedure: INJECTION, FOR SENTINEL NODE IDENTIFICATION;  Surgeon: Baylee Kevin MD;  Location: [address];  Service: General;  Laterality: Right;    INJECTION OF JOINT Right 10/10/2019    Procedure: Injection, Joint RIGHT ILIOPSOAS BURSA/TENDON INJECTION AND RIGHT GLUTEAL TENDON INJECTION WITH STEROID AND LIDOCAINE;  Surgeon: Guillaume Rico MD;  Location: [facility];  Service: Pain Management;  Laterality: Right;  NEEDS CONSENT    INSERTION OF BREAST TISSUE EXPANDER Bilateral 10/29/2020    Procedure: INSERTION, TISSUE EXPANDER, BREAST;  Surgeon: Scottie Johnson MD;  Location: [address];  Service: Plastics;  Laterality: Bilateral;  Right breast: 1082 g  Left breast: 1076 g    LIPOSUCTION Bilateral 2/11/2021    Procedure: LIPOSUCTION;  Surgeon: Scottie Johnson MD;  Location: [address];  Service: Plastics;  Laterality: Bilateral;    mediastenoscopy      REPLACEMENT OF IMPLANT OF BREAST Bilateral 2/11/2021    Procedure: REPLACEMENT, IMPLANT, BREAST;  Surgeon: Scottie Johnson MD;  Location: [address];  Service: Plastics;  Laterality: Bilateral;    SENTINEL LYMPH NODE BIOPSY Right 10/29/2020    Procedure: BIOPSY, LYMPH NODE, SENTINEL;  Surgeon: Baylee Kevin MD;  Location: [address];  Service: General;  Laterality: Right;    TONSILLECTOMY N/A 1970    TUBAL LIGATION         Review of patient's allergies indicates:   Allergen Reactions    Lortab  [hydrocodone-acetaminophen] Itching    Promethazine Other (See Comments) and Itching     Other reaction(s): Itching       No current facility-administered medications on  file prior to encounter.     Current Outpatient Medications on File Prior to Encounter   Medication Sig    albuterol (VENTOLIN HFA) 90 mcg/actuation inhaler Inhale 2 puffs into the lungs every 6 (six) hours as needed for Wheezing. Rescue    ARIPiprazole (ABILIFY) 5 MG Tab Take 1 tablet (5 mg total) by mouth 2 (two) times daily.    dulaglutide (TRULICITY) 0.75 mg/0.5 mL pen injector Inject 0.75 mg into the skin every 7 days. After 1 month if tolerating will increase to full dose    DULoxetine (CYMBALTA) 30 MG capsule Take 1 capsule (30 mg total) by mouth once daily.    FLUoxetine 60 mg Tab Take 60 mg by mouth once daily.     folic acid (FOLVITE) 1 MG tablet Take 1 tablet (1 mg total) by mouth once daily.    gabapentin (NEURONTIN) 400 MG capsule Take 1 capsule (400 mg total) by mouth 3 (three) times daily.    hydrOXYzine pamoate (VISTARIL) 50 MG Cap     letrozole (FEMARA) 2.5 mg Tab     levalbuterol (XOPENEX) 0.63 mg/3 mL nebulizer solution     levothyroxine (SYNTHROID) 25 MCG tablet Take 1 tablet (25 mcg total) by mouth before breakfast.    ondansetron (ZOFRAN ODT) 8 MG TbDL Take 1 tablet (8 mg total) by mouth 3 (three) times daily as needed (Nausea).    pantoprazole (PROTONIX) 40 MG tablet Take 1 tablet (40 mg total) by mouth once daily.    SPIRIVA WITH HANDIHALER 18 mcg inhalation capsule 1 capsule every morning.    thiamine mononitrate, vit B1, (VITAMIN B-1, MONONITRATE,) 100 mg Tab     tiZANidine (ZANAFLEX) 4 MG tablet Take 1 tablet (4 mg total) by mouth every 8 (eight) hours.    traZODone (DESYREL) 100 MG tablet Take 1 tablet (100 mg total) by mouth nightly as needed for Insomnia.     Family History     Problem Relation (Age of Onset)    Breast cancer Maternal Aunt, Daughter    Colon cancer Maternal Uncle    Diabetes Father, Mother    Heart attack Father    Hypertension Father, Mother        Tobacco Use    Smoking status: Current Every Day Smoker     Packs/day: 0.50     Years: 30.00     Pack  years: 15.00     Types: Cigarettes     Last attempt to quit: 2021     Years since quittin.9    Smokeless tobacco: Never Used   Substance and Sexual Activity    Alcohol use: Yes     Comment: daily     Drug use: Yes     Types: Marijuana     Comment: currently    Sexual activity: Yes     Birth control/protection: Surgical     Review of Systems   Constitutional: Positive for activity change and fever.   HENT: Positive for congestion.    Eyes: Negative for discharge and itching.   Respiratory: Positive for cough, shortness of breath and wheezing.    Cardiovascular: Negative for chest pain and palpitations.   Gastrointestinal: Negative for abdominal distention and abdominal pain.   Genitourinary: Negative for difficulty urinating and dysuria.   Musculoskeletal: Negative for arthralgias and back pain.   Skin: Negative for color change.   Neurological: Negative for dizziness and speech difficulty.   Psychiatric/Behavioral: Negative for agitation, behavioral problems, confusion and dysphoric mood.     Objective:     Vital Signs (Most Recent):  Temp: 98.5 °F (36.9 °C) (22 1106)  Pulse: 97 (22 1439)  Resp: 18 (22 1439)  BP: 126/60 (22 1308)  SpO2: 99 % (22 1439) Vital Signs (24h Range):  Temp:  [98.5 °F (36.9 °C)] 98.5 °F (36.9 °C)  Pulse:  [90-97] 97  Resp:  [18-20] 18  SpO2:  [85 %-100 %] 99 %  BP: (126-148)/(60-83) 126/60     Weight: 88.5 kg (195 lb 1.7 oz)  Body mass index is 31.49 kg/m².    Physical Exam  Vitals and nursing note reviewed. Exam conducted with a chaperone present.   Constitutional:       Appearance: Normal appearance. She is obese. She is ill-appearing.   HENT:      Head: Normocephalic and atraumatic.      Right Ear: External ear normal.      Left Ear: External ear normal.      Nose: Congestion and rhinorrhea present.      Mouth/Throat:      Pharynx: Oropharynx is clear.   Eyes:      Conjunctiva/sclera: Conjunctivae normal.   Cardiovascular:      Rate and Rhythm:  Regular rhythm. Tachycardia present.      Pulses: Normal pulses.      Heart sounds: Normal heart sounds.   Pulmonary:      Effort: Respiratory distress present.      Breath sounds: Wheezing present.   Chest:      Comments: Bilateral mastectomy scars with past reconstruction noted. The scars are not hypertrophic and well healed and all incisions are CDI with no signs of overlying erythema or infection   Abdominal:      General: Abdomen is flat. There is no distension.      Palpations: Abdomen is soft.      Tenderness: There is no abdominal tenderness.   Musculoskeletal:         General: No swelling, deformity or signs of injury.   Skin:     General: Skin is warm.      Findings: No erythema or rash.   Neurological:      General: No focal deficit present.      Mental Status: She is alert and oriented to person, place, and time. Mental status is at baseline.      Comments: Tremulous   Psychiatric:         Mood and Affect: Mood normal.         Behavior: Behavior normal.         Thought Content: Thought content normal.      Comments: States she feels like she is going to withdraw             Significant Labs: All pertinent labs within the past 24 hours have been reviewed.    Significant Imaging: I have reviewed all pertinent imaging results/findings within the past 24 hours.    Assessment/Plan:     * Acute hypoxemic respiratory failure  Patient with Hypoxic Respiratory failure which is Acute on chronic.  she is on home oxygen at 3 LPM. At night Supplemental oxygen was provided and noted-  .   Signs/symptoms of respiratory failure include- increased work of breathing. Contributing diagnoses includes - COPD Labs and images were reviewed. Patient Has recent ABG, which has been reviewed. Will treat underlying causes and adjust management of respiratory failure as follows-     COVID +  No clear diagnosis of COPD  Patient wears 3L 02 but only at night, has had to wear during the day.    - Will use 02 as needed for respiratory  support  - Goal 02 88-92%  - Treat Covid Per protocol   - PFTs ordered  - Home spiriva ordered  - Albuterol every 6 hours      COVID-19  Patient tested positive for covid. Is at a higher 02 requirement then at baseline    -Remdesevir Dexamethasone  -Supportive care  -Daily CBC  -Home monitoring at discharge      Obesity (BMI 30.0-34.9)  Will provide weight loss counseling      Controlled type 2 diabetes mellitus without complication, without long-term current use of insulin  On admission glucose 91    - Monitor closely in the setting of dexamethasone  - On LDSS and 2 units of detemir- will adjust as needed    Hypothyroidism  Home dose levothyroxine 25 mg ordered      Anemia  Patient with hgb of 12.0     - Daily cbcs  - transfusion goal greater than 7      Chronic obstructive airway disease  See acute hypoxemic respiratory failure      Malignant neoplasm of central portion of right breast in female, estrogen receptor positive  Per previous oncology note    Mammogram on September 4, 2020 showed a focal asymmetry in the retroareolar region of the right breast.  Ultrasound at that time showed a 9 x 5 x 8 mm hypoechoic mass at 12:00 p.m..     Biopsy on September 29, 2020 showed grade 2 infiltrating ductal carcinoma (histologic grade 3, nuclear grade 2, mitotic index 2).  Tumor was 95% ER positive 20-30% NH positive and HER2 was 2+ but negative by FISH.  Ki-67 was 80%.     On October 29, 2020 bilateral mastectomy and right axillary sentinel lymph node biopsy was performed.  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  Margins were negative with closest margin 6 mm.  The sentinel lymph node was negative.    Final pathological staging T1b N0 stage IA.     Letrozole started in February 2021.    No longer taking femara per patient         VINICIO (obstructive sleep apnea)    A PSG with Parasomnia montage was preformed on Earl Abdul on the night of 9/30/2020. The procedure was explained to the patient. All  questions were asked and answered prior to the strat of the study. The patient did not meet split night criteria set by the doctor.     Little SDB events appeared to have been noted. Snoring was noted to be mild.     The EKG seemed to have shown NSR with PVC's. The lowest Spo2 noted was 84%.     Movement in arm leads noted during ZREM sleep. The patient stated she didnt remember any of her dreams. No talking or mumbling noted during the night.     Disposable equipment used where applicable. An end of the night instruction sheet was giving to the patient upon leaving the lab.    Ramírez's syndrome  See history of sarcoidosis      Hyperlipidemia  Consider statin initiation      History of sarcoidosis  She has hx of Sarcoid diagnosed about 41 years ago, she had erythema nodosum, arthralgias, mediastinal lymphadenopathy (no respiratory symptoms). She had mediastinoscopy with biopsy that revealed diagnosis of sarcoid. She was initially treated with prednisone for about 6 months and went into remission until 7 years ago she had recurrence of arthralgias and e nodosum and was treated by primary care doctor with steroids for a couple of months. Since this time she hasn't had any flares. No hx of uveitis or other sarcoid complications.     Per EMR last seen in 2015 by Rheumatology    - Consider Rheumatology consult  - No recent flares         Depression with anxiety  Patient with significant past psychiatric history including SI SA polysubstance abuse and severe alcohol withdrawal. Will continue to monitor patient's affect closely while admitted  Per patient only taking trazadone and duloxetine      Trazadone 100mg nightly  Home dose of Duloxetine  Consider consult to psych for complex medication management      Posterior reversible encephalopathy syndrome  History of PRES in 2017      Tobacco abuse  Will  on smoking cessation     - Provide nicotine patches as needed      Essential hypertension  Patient not on home  blood pressure medications. Hypertensive to 156 while in the ED    - Consider initiation of antihypertensives while admitted      Alcohol withdrawal  See alcohol dependence      Alcohol dependence, continuous  Will monitor for signs of withdrawal. History of alcohol withdrawal in the past. Endorses daily drinking.    Tremulous and tachycardic on exam. States she feels like she is going to withdraw    - Standing Valium q8 will increase to q6 if clinically indicated  - Ciwa scale  - Benzos as needed if signs of withdraw        VTE Risk Mitigation (From admission, onward)         Ordered     enoxaparin injection 40 mg  Daily         01/08/22 1417     IP VTE HIGH RISK PATIENT  Once         01/08/22 1417     Place sequential compression device  Until discontinued         01/08/22 1417                   Donny Lee MD  Department of Hospital Medicine   [facility] - Emergency Dept

## 2022-01-08 NOTE — HPI
61-year-old female with COPD (per patient), GERD, hyperlipidemia, hypertension, diabetes alcohol withdrawal and depression which presents the emergency room with worsening shortness of breath over the past 3 days.  Patient states that she went to Urgent Care and tested positive for COVID and due to her low oxygen level they advised her to come to the emergency room.  Patient states that she usually wears 3 L of O2 at night but not during the day.  The patient also endorses nausea and dry heaving. In the past 3 days she has had to use oxygen 24 hours a day.  Her  tested positive 3 days ago. On interview the patient endorses a significant recent history of daily alcohol consumption with last drink being approximately 24 hours ago    Per urgent care  The current episode started 1 to 4 weeks ago. Associated symptoms include headaches, myalgias, nasal congestion, postnasal drip and shortness of breath. Pertinent negatives include no chest pain, ear congestion, ear pain, fever, sore throat, sweats, weight loss or wheezing. She has tried nothing for the symptoms. The treatment provided no relief. Her past medical history is significant for COPD.     CXR No significant change from prior.  Continued surgical clips overlying the cardiomediastinal silhouette and lower chest wall.  There is no new lung opacity allowing for differences in technique.  No large pleural effusion or pneumothorax.  Continued atherosclerotic aorta.  Clinical correlation and follow-up advised.    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 Current drinker with history of alcohol withdrawal

## 2022-01-08 NOTE — SUBJECTIVE & OBJECTIVE
Past Medical History:   Diagnosis Date    Anemia     Anemia     Controlled type 2 diabetes mellitus without complication, without long-term current use of insulin 11/30/2021    Depression     Diverticulitis     Fatty liver     GERD (gastroesophageal reflux disease)     Hyperlipidemia     Hypertension     Pancreatitis     Peptic ulcer disease     Polysubstance abuse     Posterior reversible encephalopathy syndrome     Sarcoidosis of lung     Sarcoidosis of lung     over 30 yrs ago    Seizures     7/2017    Suicide attempt     Suicide ideation        Past Surgical History:   Procedure Laterality Date    APPENDECTOMY      BILATERAL MASTECTOMY Bilateral 10/29/2020    Procedure: MASTECTOMY, BILATERAL;  Surgeon: Baylee Kevin MD;  Location: [address];  Service: General;  Laterality: Bilateral;    BREAST REVISION SURGERY Bilateral 2/11/2021    Procedure: BREAST REVISION SURGERY;  Surgeon: Scottie Johnson MD;  Location: [facility] OR [address] [place] [address];  Service: Plastics;  Laterality: Bilateral;    COLONOSCOPY N/A 7/28/2017    Procedure: COLONOSCOPY;  Surgeon: Aaron Alvarado MD;  Location: [facility];  Service: Endoscopy;  Laterality: N/A;    ESOPHAGOGASTRODUODENOSCOPY  10/7/2016, 11/6/2014    2016 - gastritis, duodenitis, 2014 erosive gastritis    ESOPHAGOGASTRODUODENOSCOPY N/A 2/11/2020    Procedure: ESOPHAGOGASTRODUODENOSCOPY (EGD);  Surgeon: Fawn Garrido MD;  Location: [facility];  Service: Endoscopy;  Laterality: N/A;    ESOPHAGOGASTRODUODENOSCOPY N/A 4/19/2021    Procedure: EGD (ESOPHAGOGASTRODUODENOSCOPY);  Surgeon: Paramjit Martino MD;  Location: [facility];  Service: Endoscopy;  Laterality: N/A;    FLEXIBLE SIGMOIDOSCOPY  11/06/2014    colitis    HYSTERECTOMY      INJECTION FOR SENTINEL NODE IDENTIFICATION Right 10/29/2020    Procedure: INJECTION, FOR SENTINEL NODE IDENTIFICATION;  Surgeon: Baylee Kevin MD;  Location: [address];  Service: General;  Laterality: Right;     INJECTION OF JOINT Right 10/10/2019    Procedure: Injection, Joint RIGHT ILIOPSOAS BURSA/TENDON INJECTION AND RIGHT GLUTEAL TENDON INJECTION WITH STEROID AND LIDOCAINE;  Surgeon: Guillaume Rico MD;  Location: [facility];  Service: Pain Management;  Laterality: Right;  NEEDS CONSENT    INSERTION OF BREAST TISSUE EXPANDER Bilateral 10/29/2020    Procedure: INSERTION, TISSUE EXPANDER, BREAST;  Surgeon: Scottie Johnson MD;  Location: [facility] OR [address] [place] [address];  Service: Plastics;  Laterality: Bilateral;  Right breast: 1082 g  Left breast: 1076 g    LIPOSUCTION Bilateral 2/11/2021    Procedure: LIPOSUCTION;  Surgeon: Scottie Johnson MD;  Location: [facility] OR [address] [place] [address];  Service: Plastics;  Laterality: Bilateral;    mediastenoscopy      REPLACEMENT OF IMPLANT OF BREAST Bilateral 2/11/2021    Procedure: REPLACEMENT, IMPLANT, BREAST;  Surgeon: Scottie Johnson MD;  Location: [facility] OR [address] [place] [address];  Service: Plastics;  Laterality: Bilateral;    SENTINEL LYMPH NODE BIOPSY Right 10/29/2020    Procedure: BIOPSY, LYMPH NODE, SENTINEL;  Surgeon: Baylee Kevin MD;  Location: [address];  Service: General;  Laterality: Right;    TONSILLECTOMY N/A 1970    TUBAL LIGATION         Review of patient's allergies indicates:   Allergen Reactions    Lortab  [hydrocodone-acetaminophen] Itching    Promethazine Other (See Comments) and Itching     Other reaction(s): Itching       No current facility-administered medications on file prior to encounter.     Current Outpatient Medications on File Prior to Encounter   Medication Sig    albuterol (VENTOLIN HFA) 90 mcg/actuation inhaler Inhale 2 puffs into the lungs every 6 (six) hours as needed for Wheezing. Rescue    ARIPiprazole (ABILIFY) 5 MG Tab Take 1 tablet (5 mg total) by mouth 2 (two) times daily.    dulaglutide (TRULICITY) 0.75 mg/0.5 mL pen injector Inject 0.75 mg into the skin every 7 days. After 1 month if tolerating will increase to full dose    DULoxetine  (CYMBALTA) 30 MG capsule Take 1 capsule (30 mg total) by mouth once daily.    FLUoxetine 60 mg Tab Take 60 mg by mouth once daily.     folic acid (FOLVITE) 1 MG tablet Take 1 tablet (1 mg total) by mouth once daily.    gabapentin (NEURONTIN) 400 MG capsule Take 1 capsule (400 mg total) by mouth 3 (three) times daily.    hydrOXYzine pamoate (VISTARIL) 50 MG Cap     letrozole (FEMARA) 2.5 mg Tab     levalbuterol (XOPENEX) 0.63 mg/3 mL nebulizer solution     levothyroxine (SYNTHROID) 25 MCG tablet Take 1 tablet (25 mcg total) by mouth before breakfast.    ondansetron (ZOFRAN ODT) 8 MG TbDL Take 1 tablet (8 mg total) by mouth 3 (three) times daily as needed (Nausea).    pantoprazole (PROTONIX) 40 MG tablet Take 1 tablet (40 mg total) by mouth once daily.    SPIRIVA WITH HANDIHALER 18 mcg inhalation capsule 1 capsule every morning.    thiamine mononitrate, vit B1, (VITAMIN B-1, MONONITRATE,) 100 mg Tab     tiZANidine (ZANAFLEX) 4 MG tablet Take 1 tablet (4 mg total) by mouth every 8 (eight) hours.    traZODone (DESYREL) 100 MG tablet Take 1 tablet (100 mg total) by mouth nightly as needed for Insomnia.     Family History     Problem Relation (Age of Onset)    Breast cancer Maternal Aunt, Daughter    Colon cancer Maternal Uncle    Diabetes Father, Mother    Heart attack Father    Hypertension Father, Mother        Tobacco Use    Smoking status: Current Every Day Smoker     Packs/day: 0.50     Years: 30.00     Pack years: 15.00     Types: Cigarettes     Last attempt to quit: 2021     Years since quittin.9    Smokeless tobacco: Never Used   Substance and Sexual Activity    Alcohol use: Yes     Comment: daily     Drug use: Yes     Types: Marijuana     Comment: currently    Sexual activity: Yes     Birth control/protection: Surgical     Review of Systems   Constitutional: Positive for activity change and fever.   HENT: Positive for congestion.    Eyes: Negative for discharge and itching.    Respiratory: Positive for cough, shortness of breath and wheezing.    Cardiovascular: Negative for chest pain and palpitations.   Gastrointestinal: Negative for abdominal distention and abdominal pain.   Genitourinary: Negative for difficulty urinating and dysuria.   Musculoskeletal: Negative for arthralgias and back pain.   Skin: Negative for color change.   Neurological: Negative for dizziness and speech difficulty.   Psychiatric/Behavioral: Negative for agitation, behavioral problems, confusion and dysphoric mood.     Objective:     Vital Signs (Most Recent):  Temp: 98.5 °F (36.9 °C) (01/08/22 1106)  Pulse: 97 (01/08/22 1439)  Resp: 18 (01/08/22 1439)  BP: 126/60 (01/08/22 1308)  SpO2: 99 % (01/08/22 1439) Vital Signs (24h Range):  Temp:  [98.5 °F (36.9 °C)] 98.5 °F (36.9 °C)  Pulse:  [90-97] 97  Resp:  [18-20] 18  SpO2:  [85 %-100 %] 99 %  BP: (126-148)/(60-83) 126/60     Weight: 88.5 kg (195 lb 1.7 oz)  Body mass index is 31.49 kg/m².    Physical Exam  Vitals and nursing note reviewed. Exam conducted with a chaperone present.   Constitutional:       Appearance: Normal appearance. She is obese. She is ill-appearing.   HENT:      Head: Normocephalic and atraumatic.      Right Ear: External ear normal.      Left Ear: External ear normal.      Nose: Congestion and rhinorrhea present.      Mouth/Throat:      Pharynx: Oropharynx is clear.   Eyes:      Conjunctiva/sclera: Conjunctivae normal.   Cardiovascular:      Rate and Rhythm: Regular rhythm. Tachycardia present.      Pulses: Normal pulses.      Heart sounds: Normal heart sounds.   Pulmonary:      Effort: Respiratory distress present.      Breath sounds: Wheezing present.   Chest:      Comments: Bilateral mastectomy scars with past reconstruction noted. The scars are not hypertrophic and well healed and all incisions are CDI with no signs of overlying erythema or infection   Abdominal:      General: Abdomen is flat. There is no distension.      Palpations:  Abdomen is soft.      Tenderness: There is no abdominal tenderness.   Musculoskeletal:         General: No swelling, deformity or signs of injury.   Skin:     General: Skin is warm.      Findings: No erythema or rash.   Neurological:      General: No focal deficit present.      Mental Status: She is alert and oriented to person, place, and time. Mental status is at baseline.      Comments: Tremulous   Psychiatric:         Mood and Affect: Mood normal.         Behavior: Behavior normal.         Thought Content: Thought content normal.      Comments: States she feels like she is going to withdraw             Significant Labs: All pertinent labs within the past 24 hours have been reviewed.    Significant Imaging: I have reviewed all pertinent imaging results/findings within the past 24 hours.

## 2022-01-08 NOTE — ASSESSMENT & PLAN NOTE
Will monitor for signs of withdrawal. History of alcohol withdrawal in the past. Endorses daily drinking.    Tremulous and tachycardic on exam. States she feels like she is going to withdraw    - Standing Valium q8 will increase to q6 if clinically indicated  - Ciwa scale  - Benzos as needed if signs of withdraw

## 2022-01-08 NOTE — ASSESSMENT & PLAN NOTE
Per previous oncology note    Mammogram on September 4, 2020 showed a focal asymmetry in the retroareolar region of the right breast.  Ultrasound at that time showed a 9 x 5 x 8 mm hypoechoic mass at 12:00 p.m..     Biopsy on September 29, 2020 showed grade 2 infiltrating ductal carcinoma (histologic grade 3, nuclear grade 2, mitotic index 2).  Tumor was 95% ER positive 20-30% HI positive and HER2 was 2+ but negative by FISH.  Ki-67 was 80%.     On October 29, 2020 bilateral mastectomy and right axillary sentinel lymph node biopsy was performed.  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  Margins were negative with closest margin 6 mm.  The sentinel lymph node was negative.    Final pathological staging T1b N0 stage IA.     Letrozole started in February 2021.    No longer taking femara per patient

## 2022-01-08 NOTE — ASSESSMENT & PLAN NOTE
Patient not on home blood pressure medications. Hypertensive to 156 while in the ED    - Consider initiation of antihypertensives while admitted

## 2022-01-08 NOTE — ASSESSMENT & PLAN NOTE
Patient with Hypoxic Respiratory failure which is Acute on chronic.  she is on home oxygen at 3 LPM. At night Supplemental oxygen was provided and noted-  .   Signs/symptoms of respiratory failure include- increased work of breathing. Contributing diagnoses includes - COPD Labs and images were reviewed. Patient Has recent ABG, which has been reviewed. Will treat underlying causes and adjust management of respiratory failure as follows-     COVID +  No clear diagnosis of COPD  Patient wears 3L 02 but only at night, has had to wear during the day.  Wells criteria 1.5    - Will use 02 as needed for respiratory support  - Goal 02 88-92%  - Treat Covid Per protocol   - PFTs ordered  - Home spiriva ordered  - Albuterol every 6 hours

## 2022-01-09 LAB
ALBUMIN SERPL BCP-MCNC: 3.8 G/DL (ref 3.5–5.2)
ALP SERPL-CCNC: 87 U/L (ref 55–135)
ALT SERPL W/O P-5'-P-CCNC: 60 U/L (ref 10–44)
ANION GAP SERPL CALC-SCNC: 11 MMOL/L (ref 8–16)
AST SERPL-CCNC: 64 U/L (ref 10–40)
BASOPHILS # BLD AUTO: 0 K/UL (ref 0–0.2)
BASOPHILS NFR BLD: 0 % (ref 0–1.9)
BILIRUB SERPL-MCNC: 0.5 MG/DL (ref 0.1–1)
BUN SERPL-MCNC: 9 MG/DL (ref 8–23)
CALCIUM SERPL-MCNC: 8.7 MG/DL (ref 8.7–10.5)
CHLORIDE SERPL-SCNC: 100 MMOL/L (ref 95–110)
CO2 SERPL-SCNC: 25 MMOL/L (ref 23–29)
CREAT SERPL-MCNC: 0.7 MG/DL (ref 0.5–1.4)
DIFFERENTIAL METHOD: ABNORMAL
EOSINOPHIL # BLD AUTO: 0 K/UL (ref 0–0.5)
EOSINOPHIL NFR BLD: 0 % (ref 0–8)
ERYTHROCYTE [DISTWIDTH] IN BLOOD BY AUTOMATED COUNT: 15.4 % (ref 11.5–14.5)
EST. GFR  (AFRICAN AMERICAN): >60 ML/MIN/1.73 M^2
EST. GFR  (NON AFRICAN AMERICAN): >60 ML/MIN/1.73 M^2
GLUCOSE SERPL-MCNC: 142 MG/DL (ref 70–110)
HCT VFR BLD AUTO: 37.4 % (ref 37–48.5)
HGB BLD-MCNC: 11.5 G/DL (ref 12–16)
IMM GRANULOCYTES # BLD AUTO: 0.02 K/UL (ref 0–0.04)
IMM GRANULOCYTES NFR BLD AUTO: 0.6 % (ref 0–0.5)
LYMPHOCYTES # BLD AUTO: 1.7 K/UL (ref 1–4.8)
LYMPHOCYTES NFR BLD: 49.8 % (ref 18–48)
MAGNESIUM SERPL-MCNC: 1.6 MG/DL (ref 1.6–2.6)
MCH RBC QN AUTO: 25.7 PG (ref 27–31)
MCHC RBC AUTO-ENTMCNC: 30.7 G/DL (ref 32–36)
MCV RBC AUTO: 84 FL (ref 82–98)
MONOCYTES # BLD AUTO: 0.2 K/UL (ref 0.3–1)
MONOCYTES NFR BLD: 5.1 % (ref 4–15)
NEUTROPHILS # BLD AUTO: 1.5 K/UL (ref 1.8–7.7)
NEUTROPHILS NFR BLD: 44.5 % (ref 38–73)
NRBC BLD-RTO: 0 /100 WBC
PHOSPHATE SERPL-MCNC: 3.4 MG/DL (ref 2.7–4.5)
PLATELET # BLD AUTO: 87 K/UL (ref 150–450)
PMV BLD AUTO: 9.8 FL (ref 9.2–12.9)
POCT GLUCOSE: 121 MG/DL (ref 70–110)
POCT GLUCOSE: 148 MG/DL (ref 70–110)
POCT GLUCOSE: 171 MG/DL (ref 70–110)
POCT GLUCOSE: 192 MG/DL (ref 70–110)
POCT GLUCOSE: 221 MG/DL (ref 70–110)
POTASSIUM SERPL-SCNC: 3.8 MMOL/L (ref 3.5–5.1)
PROT SERPL-MCNC: 6.9 G/DL (ref 6–8.4)
RBC # BLD AUTO: 4.47 M/UL (ref 4–5.4)
SODIUM SERPL-SCNC: 136 MMOL/L (ref 136–145)
WBC # BLD AUTO: 3.31 K/UL (ref 3.9–12.7)

## 2022-01-09 PROCEDURE — 99233 PR SUBSEQUENT HOSPITAL CARE,LEVL III: ICD-10-PCS | Mod: ,,, | Performed by: INTERNAL MEDICINE

## 2022-01-09 PROCEDURE — 27000221 HC OXYGEN, UP TO 24 HOURS

## 2022-01-09 PROCEDURE — 12000002 HC ACUTE/MED SURGE SEMI-PRIVATE ROOM

## 2022-01-09 PROCEDURE — 83735 ASSAY OF MAGNESIUM: CPT | Performed by: STUDENT IN AN ORGANIZED HEALTH CARE EDUCATION/TRAINING PROGRAM

## 2022-01-09 PROCEDURE — 25000003 PHARM REV CODE 250: Performed by: STUDENT IN AN ORGANIZED HEALTH CARE EDUCATION/TRAINING PROGRAM

## 2022-01-09 PROCEDURE — 94761 N-INVAS EAR/PLS OXIMETRY MLT: CPT

## 2022-01-09 PROCEDURE — 90792 PSYCH DIAG EVAL W/MED SRVCS: CPT | Mod: ,,, | Performed by: PSYCHIATRY & NEUROLOGY

## 2022-01-09 PROCEDURE — 25000003 PHARM REV CODE 250: Performed by: INTERNAL MEDICINE

## 2022-01-09 PROCEDURE — 25000003 PHARM REV CODE 250

## 2022-01-09 PROCEDURE — 63600175 PHARM REV CODE 636 W HCPCS: Performed by: STUDENT IN AN ORGANIZED HEALTH CARE EDUCATION/TRAINING PROGRAM

## 2022-01-09 PROCEDURE — 90792 PR PSYCHIATRIC DIAGNOSTIC EVALUATION W/MEDICAL SERVICES: ICD-10-PCS | Mod: ,,, | Performed by: PSYCHIATRY & NEUROLOGY

## 2022-01-09 PROCEDURE — 99900035 HC TECH TIME PER 15 MIN (STAT)

## 2022-01-09 PROCEDURE — 36415 COLL VENOUS BLD VENIPUNCTURE: CPT | Performed by: STUDENT IN AN ORGANIZED HEALTH CARE EDUCATION/TRAINING PROGRAM

## 2022-01-09 PROCEDURE — 27000207 HC ISOLATION

## 2022-01-09 PROCEDURE — 84100 ASSAY OF PHOSPHORUS: CPT | Performed by: STUDENT IN AN ORGANIZED HEALTH CARE EDUCATION/TRAINING PROGRAM

## 2022-01-09 PROCEDURE — 99233 SBSQ HOSP IP/OBS HIGH 50: CPT | Mod: ,,, | Performed by: INTERNAL MEDICINE

## 2022-01-09 PROCEDURE — 80053 COMPREHEN METABOLIC PANEL: CPT | Performed by: STUDENT IN AN ORGANIZED HEALTH CARE EDUCATION/TRAINING PROGRAM

## 2022-01-09 PROCEDURE — 25000242 PHARM REV CODE 250 ALT 637 W/ HCPCS: Performed by: STUDENT IN AN ORGANIZED HEALTH CARE EDUCATION/TRAINING PROGRAM

## 2022-01-09 PROCEDURE — 94640 AIRWAY INHALATION TREATMENT: CPT

## 2022-01-09 PROCEDURE — 93005 ELECTROCARDIOGRAM TRACING: CPT

## 2022-01-09 PROCEDURE — 85025 COMPLETE CBC W/AUTO DIFF WBC: CPT | Performed by: STUDENT IN AN ORGANIZED HEALTH CARE EDUCATION/TRAINING PROGRAM

## 2022-01-09 PROCEDURE — 93010 ELECTROCARDIOGRAM REPORT: CPT | Mod: ,,, | Performed by: INTERNAL MEDICINE

## 2022-01-09 PROCEDURE — 93010 EKG 12-LEAD: ICD-10-PCS | Mod: ,,, | Performed by: INTERNAL MEDICINE

## 2022-01-09 RX ORDER — ACETAMINOPHEN 500 MG
1000 TABLET ORAL EVERY 8 HOURS PRN
Status: DISCONTINUED | OUTPATIENT
Start: 2022-01-09 | End: 2022-01-14 | Stop reason: HOSPADM

## 2022-01-09 RX ORDER — DIAZEPAM 5 MG/1
10 TABLET ORAL EVERY 12 HOURS
Status: DISCONTINUED | OUTPATIENT
Start: 2022-01-10 | End: 2022-01-09

## 2022-01-09 RX ORDER — DEXMEDETOMIDINE HYDROCHLORIDE 4 UG/ML
0-1.4 INJECTION, SOLUTION INTRAVENOUS CONTINUOUS
Status: DISCONTINUED | OUTPATIENT
Start: 2022-01-09 | End: 2022-01-09

## 2022-01-09 RX ORDER — DIAZEPAM 5 MG/1
5 TABLET ORAL EVERY 6 HOURS
Status: DISCONTINUED | OUTPATIENT
Start: 2022-01-09 | End: 2022-01-09

## 2022-01-09 RX ORDER — LORAZEPAM 2 MG/ML
2 INJECTION INTRAMUSCULAR
Status: DISCONTINUED | OUTPATIENT
Start: 2022-01-09 | End: 2022-01-09

## 2022-01-09 RX ORDER — DIAZEPAM 5 MG/1
5 TABLET ORAL NIGHTLY
Status: DISCONTINUED | OUTPATIENT
Start: 2022-01-12 | End: 2022-01-09

## 2022-01-09 RX ORDER — LORAZEPAM 2 MG/ML
2 INJECTION INTRAMUSCULAR EVERY 4 HOURS PRN
Status: DISCONTINUED | OUTPATIENT
Start: 2022-01-09 | End: 2022-01-14 | Stop reason: HOSPADM

## 2022-01-09 RX ORDER — DIAZEPAM 5 MG/1
10 TABLET ORAL EVERY 6 HOURS
Status: DISCONTINUED | OUTPATIENT
Start: 2022-01-09 | End: 2022-01-09

## 2022-01-09 RX ORDER — DIAZEPAM 5 MG/1
5 TABLET ORAL EVERY 12 HOURS
Status: DISCONTINUED | OUTPATIENT
Start: 2022-01-11 | End: 2022-01-11

## 2022-01-09 RX ORDER — DIAZEPAM 5 MG/1
10 TABLET ORAL EVERY 8 HOURS
Status: DISCONTINUED | OUTPATIENT
Start: 2022-01-09 | End: 2022-01-09

## 2022-01-09 RX ORDER — TRAZODONE HYDROCHLORIDE 100 MG/1
100 TABLET ORAL NIGHTLY
Status: DISCONTINUED | OUTPATIENT
Start: 2022-01-09 | End: 2022-01-14 | Stop reason: HOSPADM

## 2022-01-09 RX ORDER — SODIUM CHLORIDE 9 MG/ML
INJECTION, SOLUTION INTRAVENOUS
Status: DISCONTINUED | OUTPATIENT
Start: 2022-01-09 | End: 2022-01-14 | Stop reason: HOSPADM

## 2022-01-09 RX ORDER — DIAZEPAM 5 MG/1
5 TABLET ORAL NIGHTLY
Status: DISCONTINUED | OUTPATIENT
Start: 2022-01-12 | End: 2022-01-11

## 2022-01-09 RX ORDER — DIAZEPAM 5 MG/1
5 TABLET ORAL ONCE
Status: COMPLETED | OUTPATIENT
Start: 2022-01-09 | End: 2022-01-09

## 2022-01-09 RX ORDER — DIAZEPAM 5 MG/1
10 TABLET ORAL EVERY 8 HOURS
Status: COMPLETED | OUTPATIENT
Start: 2022-01-09 | End: 2022-01-09

## 2022-01-09 RX ORDER — LOPERAMIDE HYDROCHLORIDE 2 MG/1
2 CAPSULE ORAL ONCE
Status: COMPLETED | OUTPATIENT
Start: 2022-01-09 | End: 2022-01-09

## 2022-01-09 RX ORDER — DIAZEPAM 5 MG/1
5 TABLET ORAL EVERY 12 HOURS
Status: DISCONTINUED | OUTPATIENT
Start: 2022-01-11 | End: 2022-01-09

## 2022-01-09 RX ORDER — DIAZEPAM 5 MG/1
10 TABLET ORAL EVERY 12 HOURS
Status: COMPLETED | OUTPATIENT
Start: 2022-01-10 | End: 2022-01-10

## 2022-01-09 RX ADMIN — LORAZEPAM 2 MG: 2 INJECTION INTRAMUSCULAR; INTRAVENOUS at 03:01

## 2022-01-09 RX ADMIN — LOPERAMIDE HYDROCHLORIDE 2 MG: 2 CAPSULE ORAL at 03:01

## 2022-01-09 RX ADMIN — LEVOTHYROXINE SODIUM 25 MCG: 0.03 TABLET ORAL at 06:01

## 2022-01-09 RX ADMIN — ALBUTEROL SULFATE 2 PUFF: 108 INHALANT RESPIRATORY (INHALATION) at 05:01

## 2022-01-09 RX ADMIN — INSULIN ASPART 2 UNITS: 100 INJECTION, SOLUTION INTRAVENOUS; SUBCUTANEOUS at 12:01

## 2022-01-09 RX ADMIN — PANTOPRAZOLE SODIUM 40 MG: 40 TABLET, DELAYED RELEASE ORAL at 08:01

## 2022-01-09 RX ADMIN — DIAZEPAM 5 MG: 5 TABLET ORAL at 09:01

## 2022-01-09 RX ADMIN — DEXAMETHASONE 6 MG: 4 TABLET ORAL at 08:01

## 2022-01-09 RX ADMIN — REMDESIVIR 100 MG: 100 INJECTION, POWDER, LYOPHILIZED, FOR SOLUTION INTRAVENOUS at 08:01

## 2022-01-09 RX ADMIN — ALBUTEROL SULFATE 2 PUFF: 108 INHALANT RESPIRATORY (INHALATION) at 01:01

## 2022-01-09 RX ADMIN — OXYCODONE HYDROCHLORIDE AND ACETAMINOPHEN 500 MG: 500 TABLET ORAL at 08:01

## 2022-01-09 RX ADMIN — DIAZEPAM 10 MG: 5 TABLET ORAL at 11:01

## 2022-01-09 RX ADMIN — INSULIN DETEMIR 2 UNITS: 100 INJECTION, SOLUTION SUBCUTANEOUS at 09:01

## 2022-01-09 RX ADMIN — ACETAMINOPHEN 1000 MG: 500 TABLET ORAL at 06:01

## 2022-01-09 RX ADMIN — DIAZEPAM 5 MG: 5 TABLET ORAL at 06:01

## 2022-01-09 RX ADMIN — ALBUTEROL SULFATE 2 PUFF: 108 INHALANT RESPIRATORY (INHALATION) at 07:01

## 2022-01-09 RX ADMIN — FOLIC ACID 1 MG: 1 TABLET ORAL at 08:01

## 2022-01-09 RX ADMIN — ALBUTEROL SULFATE 2 PUFF: 108 INHALANT RESPIRATORY (INHALATION) at 12:01

## 2022-01-09 RX ADMIN — SODIUM CHLORIDE: 0.9 INJECTION, SOLUTION INTRAVENOUS at 08:01

## 2022-01-09 RX ADMIN — LORAZEPAM 2 MG: 2 INJECTION INTRAMUSCULAR; INTRAVENOUS at 11:01

## 2022-01-09 RX ADMIN — LORAZEPAM 2 MG: 2 INJECTION INTRAMUSCULAR; INTRAVENOUS at 08:01

## 2022-01-09 RX ADMIN — MULTIPLE VITAMINS W/ MINERALS TAB 1 TABLET: TAB at 08:01

## 2022-01-09 RX ADMIN — LORAZEPAM 2 MG: 0.5 TABLET ORAL at 08:01

## 2022-01-09 RX ADMIN — FLUTICASONE FUROATE AND VILANTEROL TRIFENATATE 1 PUFF: 100; 25 POWDER RESPIRATORY (INHALATION) at 07:01

## 2022-01-09 RX ADMIN — DULOXETINE 30 MG: 30 CAPSULE, DELAYED RELEASE ORAL at 08:01

## 2022-01-09 RX ADMIN — OXYCODONE HYDROCHLORIDE AND ACETAMINOPHEN 500 MG: 500 TABLET ORAL at 09:01

## 2022-01-09 RX ADMIN — TRAZODONE HYDROCHLORIDE 100 MG: 100 TABLET ORAL at 02:01

## 2022-01-09 RX ADMIN — DIAZEPAM 10 MG: 5 TABLET ORAL at 06:01

## 2022-01-09 RX ADMIN — TRAZODONE HYDROCHLORIDE 100 MG: 100 TABLET ORAL at 09:01

## 2022-01-09 NOTE — ASSESSMENT & PLAN NOTE
"60yo F with sarcoidosis, COPD on intermittent home O2 2L NC, GERD, HTN, HLD, T2DM, depression, EtOH abuse with cirrhosis (per pt; follows with hepatology at [facility]) presented with SOB on 1/8/2022 and was admitted for acute on chronic hypoxemic respiratory failure 2/2 covid. Last drink was approx 24h before admission. Evolving withdraw symptoms and hx of WD seizure "years ago". Critical care consulted for consideration of precedex adminstration.     On my exam pt with mild WD sx (tremors, mild abd cramping and nausea) but otherwise is lucid with normal mentation and behavior. No hallucinations or agitation. Very calmly resting in bed on NC. She was started on Valium 5mg q8h on admission with ativan prn with CIWA protocol. At the time of my eval CIWA was at most 11.     Recommendations:  - Increase scheduled valium to 10 mg TID   - q4h CIWA with prns available; recommend prn indications be objective measures of WD vs subjective sx  - Can increase benzos if needed   - Do not think Precedex or ICU transfer is warranted at this time, please re-consult for CIWA>16  "

## 2022-01-09 NOTE — PROGRESS NOTES
[facility] - Intensive Care ([address] Medicine  Progress Note    Patient Name: Earl Abdul  MRN: [id number]  Patient Class: IP- Inpatient   Admission Date: 1/8/2022  Length of Stay: 1 days  Attending Physician: Christen Proctor MD  Primary Care Provider: Andrew Rodriguez MD        Subjective:     Principal Problem:Acute hypoxemic respiratory failure        HPI:      61-year-old female with COPD (per patient), GERD, hyperlipidemia, hypertension, diabetes alcohol withdrawal and depression which presents the emergency room with worsening shortness of breath over the past 3 days.  Patient states that she went to Urgent Care and tested positive for COVID and due to her low oxygen level they advised her to come to the emergency room.  Patient states that she usually wears 3 L of O2 at night but not during the day.  The patient also endorses nausea and dry heaving. In the past 3 days she has had to use oxygen 24 hours a day.  Her  tested positive 3 days ago. On interview the patient endorses a significant recent history of daily alcohol consumption with last drink being approximately 24 hours ago    Per urgent care  The current episode started 1 to 4 weeks ago. Associated symptoms include headaches, myalgias, nasal congestion, postnasal drip and shortness of breath. Pertinent negatives include no chest pain, ear congestion, ear pain, fever, sore throat, sweats, weight loss or wheezing. She has tried nothing for the symptoms. The treatment provided no relief. Her past medical history is significant for COPD.     CXR No significant change from prior.  Continued surgical clips overlying the cardiomediastinal silhouette and lower chest wall.  There is no new lung opacity allowing for differences in technique.  No large pleural effusion or pneumothorax.  Continued atherosclerotic aorta.  Clinical correlation and follow-up advised.    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 30 year smoking history. Current drinker with history of alcohol withdrawal      Overview/Hospital Course:  No notes on file    No new subjective & objective note has been filed under this hospital service since the last note was generated.      Assessment/Plan:      * Acute hypoxemic respiratory failure  Patient with Hypoxic Respiratory failure which is Acute on chronic.  she is on home oxygen at 3 LPM. At night Supplemental oxygen was provided and noted-  .   Signs/symptoms of respiratory failure include- increased work of breathing. Contributing diagnoses includes - COPD Labs and images were reviewed. Patient Has recent ABG, which has been reviewed. Will treat underlying causes and adjust management of respiratory failure as follows-     COVID +  No clear diagnosis of COPD  Patient wears 3L 02 but only at night, has had to wear during the day.  Wells criteria 1.5    - Will use 02 as needed for respiratory support  - Goal 02 88-92%  - Treat Covid Per protocol   - PFTs ordered  - Home spiriva ordered  - Albuterol every 6 hours      COVID-19  Patient tested positive for covid. Is at a higher 02 requirement then at baseline    -Remdesevir Dexamethasone  -Supportive care  -Daily CBC  -Home monitoring at discharge      Obesity (BMI 30.0-34.9)  Will provide weight loss counseling      Controlled type 2 diabetes mellitus without complication, without long-term current use of insulin  On admission glucose 91    - Monitor closely in the setting of dexamethasone  - On LDSS and 2 units of detemir- will adjust as needed    Hypothyroidism  Home dose levothyroxine 25 mg ordered      Anemia  Patient with hgb of 12.0     - Daily cbcs  - transfusion goal greater than 7      Chronic obstructive airway  disease  See acute hypoxemic respiratory failure      Malignant neoplasm of central portion of right breast in female, estrogen receptor positive  Per previous oncology note    Mammogram on September 4, 2020 showed a focal asymmetry in the retroareolar region of the right breast.  Ultrasound at that time showed a 9 x 5 x 8 mm hypoechoic mass at 12:00 p.m..     Biopsy on September 29, 2020 showed grade 2 infiltrating ductal carcinoma (histologic grade 3, nuclear grade 2, mitotic index 2).  Tumor was 95% ER positive 20-30% MN positive and HER2 was 2+ but negative by FISH.  Ki-67 was 80%.     On October 29, 2020 bilateral mastectomy and right axillary sentinel lymph node biopsy was performed.  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  Margins were negative with closest margin 6 mm.  The sentinel lymph node was negative.    Final pathological staging T1b N0 stage IA.     Letrozole started in February 2021.    No longer taking femara per patient         VINICIO (obstructive sleep apnea)    A PSG with Parasomnia montage was preformed on Earl Abdul on the night of 9/30/2020. The procedure was explained to the patient. All questions were asked and answered prior to the strat of the study. The patient did not meet split night criteria set by the doctor.     Little SDB events appeared to have been noted. Snoring was noted to be mild.     The EKG seemed to have shown NSR with PVC's. The lowest Spo2 noted was 84%.     Movement in arm leads noted during ZREM sleep. The patient stated she didnt remember any of her dreams. No talking or mumbling noted during the night.     Disposable equipment used where applicable. An end of the night instruction sheet was giving to the patient upon leaving the lab.    Ramírez's syndrome  See history of sarcoidosis      Hyperlipidemia  Consider statin initiation      History of sarcoidosis  She has hx of Sarcoid diagnosed about 41 years ago, she had erythema nodosum,  arthralgias, mediastinal lymphadenopathy (no respiratory symptoms). She had mediastinoscopy with biopsy that revealed diagnosis of sarcoid. She was initially treated with prednisone for about 6 months and went into remission until 7 years ago she had recurrence of arthralgias and e nodosum and was treated by primary care doctor with steroids for a couple of months. Since this time she hasn't had any flares. No hx of uveitis or other sarcoid complications.     Per EMR last seen in 2015 by Rheumatology    - Consider Rheumatology consult  - No recent flares         Depression with anxiety  Patient with significant past psychiatric history including SI SA polysubstance abuse and severe alcohol withdrawal. Will continue to monitor patient's affect closely while admitted  Per patient only taking trazadone and duloxetine      Trazadone 100mg nightly  Home dose of Duloxetine  Consider consult to psych for complex medication management      Posterior reversible encephalopathy syndrome  History of PRES in 2017      Tobacco abuse  Will  on smoking cessation     - Provide nicotine patches as needed      Essential hypertension  Patient not on home blood pressure medications. Hypertensive to 156 while in the ED    - Consider initiation of antihypertensives while admitted      Alcohol withdrawal  See alcohol dependence      Alcohol dependence, continuous  Will monitor for signs of withdrawal. History of alcohol withdrawal in the past. Endorses daily drinking.    Tremulous and tachycardic on exam. States she feels like she is going to withdraw.     - Valium 10 q6  - Ciwa scale  - Benzos as needed if signs of withdraw  - Addiction psych consult- recs appreciated  - Will monitor for potential escalation         VTE Risk Mitigation (From admission, onward)         Ordered     IP VTE HIGH RISK PATIENT  Once         01/08/22 1417     Place sequential compression device  Until discontinued         01/08/22 1410                 Discharge Planning   BECCA:      Code Status: Full Code   Is the patient medically ready for discharge?:     Reason for patient still in hospital (select all that apply): Treatment                     Donny Lee MD  Department of Hospital Medicine   [facility] - Intensive Care ([place]-16)

## 2022-01-09 NOTE — HPI
"62yo F with sarcoidosis, COPD on intermittent home O2 2L NC, GERD, HTN, HLD, T2DM, depression, EtOH abuse with cirrhosis (per pt; follows with hepatology at [facility]) presented with SOB on 1/8/2022. She was found to be covid+ at urgent care and sent to the ED due to hypoxia requiring 4L NC. Admitted to hospital medicine for acute on chronic hypoxemic respiratory failure. Covid therapies started on admission. Hx of EtOH abuse with last drink 1/7 AM. Pt reports hx of EtOH seizures, last "years ago".She was started on scheduled valium 5mg q8h with CIWA protocol + prn IV ativan for EtOH withdrawal on admission.     On 1/9 critical care was consulted for "The patient is tremulous, diaphoretic and hypertensive. Last drink approximately 48 hours ago. Patient with previous history of severe alchol withdrawal.  Patient likely requires closer monitoring for treatment of alcohol withdrawal".    On my arrival to pt's room at around 8:45am pt lying in bed calmly, mildly tremulous in b/l UE, mild nausea with abd cramping but no vomiting. No diaphoresis and not in distress. Received prn 2mg IV ativan shortly before my arrival. HR 90s and BP mildly elevated with SBP 140s-150s. She was A&O x4 with normal mentation. Denied auditory or visual hallucinations. CIWA 11 at that time.  "

## 2022-01-09 NOTE — CONSULTS
[facility] - Intensive Care ([place] [address])  Psychiatry  Consult Note    Patient Name: Earl Abdul  MRN: [id number]   Code Status: Full Code  Admission Date: 1/8/2022  Hospital Length of Stay: 1 days  Attending Physician: Christen Proctor MD  Primary Care Provider: Andrew Rodriguez MD    Current Legal Status: Uncontested    Patient information was obtained from patient, past medical records, ER records and primary team.   Inpatient consult to Psychiatry  Consult performed by: Kari Eason MD  Consult ordered by: Donny Lee MD        Subjective:     Principal Problem:Acute hypoxemic respiratory failure    Reason for Consult:  Alcohol Withdrawal     HPI:   1/9/2022 1:49 PM  Earl Abdul  1960  [id number]      ADDICTION CONSULT INITIAL EVALUATION     DEPARTMENT:  Psychiatry  SITE: [facility] [address]    DATE OF ADMISSION: 1/8/2022 11:08 AM  LENGTH OF STAY: 1 days    EXAMINING PRACTITIONER: Kari Eason    CONSULT REQUESTED BY: Christen Proctor MD      SUBJECTIVE     CHIEF COMPLAINT  Earl Abdul is a 61 y.o. female who is seen today for an initial psychiatric evaluation by the addiction psychiatry consult service for alcohol withdrawal  Earl Abdul presents with the chief complaint of: SOB and COVID+      HISTORY OF PRESENT ILLNESS    Per Primary MD:  61-year-old female with COPD (per patient), GERD, hyperlipidemia, hypertension, diabetes alcohol withdrawal and depression which presents the emergency room with worsening shortness of breath over the past 3 days.  Patient states that she went to Urgent Care and tested positive for COVID and due to her low oxygen level they advised her to come to the emergency room.  Patient states that she usually wears 3 L of O2 at night but not during the day.  The patient also endorses nausea and dry heaving. In the past 3 days she has had to use oxygen 24 hours a day.  Her  tested positive 3 days ago. On interview the patient  "endorses a significant recent history of daily alcohol consumption with last drink being approximately 24 hours ago     Per urgent care  The current episode started 1 to 4 weeks ago. Associated symptoms include headaches, myalgias, nasal congestion, postnasal drip and shortness of breath. Pertinent negatives include no chest pain, ear congestion, ear pain, fever, sore throat, sweats, weight loss or wheezing. She has tried nothing for the symptoms. The treatment provided no relief. Her past medical history is significant for COPD.      CXR No significant change from prior.  Continued surgical clips overlying the cardiomediastinal silhouette and lower chest wall.  There is no new lung opacity allowing for differences in technique.  No large pleural effusion or pneumothorax.  Continued atherosclerotic aorta.  Clinical correlation and follow-up advised.     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 30 year smoking history. Current drinker with history of alcohol withdrawal       Per Addiction Psych MD:  On initial interview, patient is lying in bed resting. She is calm but notably tremulous and somewhat drowsy. Agrees to interview and evaluation by psychiatry. She reports she came to hospital for SOB due to COVID. When discussing alcohol use, patient states she drinks apprx "a fifth a day"; attributes recent increase in alcohol 2/2 feeling "depressed, especially during the holidays". Reports she was in rehab apprx 6 months ago and was able to stay sober for 4 months after but subsequently relapsed. When discussed rehab in future, she declines and states "I can do it on my own this time". Denies any active SI or HI currently but does report intermittent SI thoughts " "in past when younger. Denies AVH. Patient lives at home with  who she cites as "very supportive".     Reports pain specialist doctor "Dr. Aguilera" prescribes her Pristiq 30mg which she takes daily. (Of note, on chart review, patient reports other psychiatric medications of Cymbalta and Trazadone). Previously seen in [facility].     On admission: BAL is 175, UDS positive for THC and Benzodiazepines (possibly from ED).   On Interview, patient's vitals are: 92% O2 saturation, 104 HR, and 145/64 BP.     COLLATERAL  None    SUBSTANCE ABUSE HISTORY  Substance(s) of Choice: Alcohol, THC   Substances Used: Alcohol  History of IVDU?: Denies  Use of Alcohol: Yes  Average Consumption: 1/5th liquor/day  Last Drink: Prior to ED Presentation (1 day ago)  Use of Medications for Alcohol/Opioid Use Disorder: Denies  History of Complicated Withdrawal: Yes, confirms hx of seizure  History of Detox: Yes   Rehab History: Yes apprx 6 months ago  AA/NA involvement: Yes per chart review  Tobacco: Reports 3 cigarettes a day and vaping  Spouse/Partner Consumption: Yes, reports some social consumption (states "outside the house") of alcohol by   Patient Aware of Biomedical Complications: Yes    DSM-5 SUBSTANCE USE DISORDER CRITERIA   Mild (1-3), Moderate (4-5), Severe (?6)  1. Often take in larger amounts or over a longer period of time than was intended.  2. Persistent desire or unsuccessful efforts to cut down or control use.  3. Great deal of time spent in activities necessary to obtain substance, use, or recover from effects.  4. Craving/strong desire for substance or urge to use.  5. Use resulting in failure to fulfill major role obligations at home, work or school.  6. Social, occupational, recreational activities decreased because of use.  7. Continued use despite having persistent or recurrent social or interpersonal problems cause or exaserbated by the substance.  8. Recurrent use in situations in which it is physically " "hazardous.  9. Use despite physical or psychological problems that are likely to have been caused or exacerbated by the substance.  10. Tolerance, as defined by either of the following.   A. A need for markedly increased amounts of substance to achieve intoxication or desired effect. -OR-    B. A markedly diminished effect with continued use of the same amount of substance.  11. Withdrawal, as manifested by the following.   A. The characteristic withdrawal syndrome for substance. -AND-   B. Substance is taken to relieve or avoid withdrawal symptoms.    ARE THE CRITERIA MET FOR DSM-5 SUBSTANCE USE DISORDER: Severe      PSYCHIATRIC HISTORY  Reported Diagnose(s): Depression, Anxiety  Previous Medication Trials: Pristiq, Cymbalta, Trazadone  Previous Psychiatric Hospitalizations: Denies  Outpatient Psychiatrist?: Denies; reports Pain specialist "Dr Aguilera" rx medications?; Per chart review, patient was previously seen in [facility] by Dr. Cortes       SUICIDE/HOMICIDE RISK ASSESSMENT  Current/active suicidal ideation/plan/intent: No  Previous suicide attempts: Yes, "many years ago"  Current/active homicidal ideation/plan/intent: No      HISTORY OF TRAUMA, ABUSE & VIOLENCE  Trauma: Unable to assess  Physical Abuse: Unable to assess  Sexual Abuse: Unable to Assess  Violent Conduct: Denies    Access to Gun: Denies       FAMILY PSYCHIATRIC HISTORY   Unable to Assess       SOCIAL HISTORY  Lives with   Is an Artist   Had 2 children (1 )  Cites tobacco use (3 cigarettes/day for 30+ years) and vaping (nicotine and cannabis)  Confirms daily EtOH Use (1/5th liquor/day)        PAST MEDICAL HISTORY   GERD, HLD, HTN, DM, EtOH Use Disorder and Withdrawal      PSYCHOSOCIAL FACTORS  Stressors (Psychosocial and Environmental): drug and alcohol.             Hospital Course: No notes on file    PSYCHIATRIC ROS  Psychological ROS: positive for - depression, hostility, irritability and sleep disturbances  negative for - " disorientation, hallucinations, memory difficulties or suicidal ideation      MEDICAL ROS  Complete review of systems performed covering Constitutional, Eyes, ENT/Mouth, Cardiovascular, Respiratory, Gastrointestinal, Genitourinary, Musculoskeletal, Skin, Neurologic, Endocrine, Heme/Lymph, and Allergy/Immune.     Complete review of systems was negative with the exception of the following positive symptoms: dyspnea      ALLERGIES  Lortab  [hydrocodone-acetaminophen] and Promethazine      MEDICATIONS  Psychotropics:    diazePAM tablet 10 mg, 10 mg, Oral, Q6H,     DULoxetine DR capsule 30 mg, 30 mg, Oral, Daily    lorazepam injection 2 mg, 2 mg, Intravenous, Q2H PRN,     LORazepam tablet 2 mg, 2 mg, Oral, Q4H PRN    traZODone tablet 100 mg, 100 mg, Oral, QHS    Of note, patient also reports use of Pristiq 30mg daily?     Infusions:  None      Scheduled:   albuterol  2 puff Inhalation Q6H    ascorbic acid (vitamin C)  500 mg Oral BID    dexAMETHasone  6 mg Oral Daily    diazePAM  10 mg Oral Q6H    DULoxetine  30 mg Oral Daily    fluticasone furoate-vilanteroL  1 puff Inhalation Daily    folic acid  1 mg Oral Daily    insulin detemir U-100  2 Units Subcutaneous QHS    levothyroxine  25 mcg Oral Before breakfast    multivitamin  1 tablet Oral Daily    pantoprazole  40 mg Oral Daily    remdesivir infusion  100 mg Intravenous Daily    traZODone  100 mg Oral QHS       PRN:  sodium chloride 0.9%, acetaminophen, benzonatate, dextrose 50%, dextrose 50%, glucagon (human recombinant), glucose, glucose, insulin aspart U-100, lorazepam, LORazepam, naloxone, sodium chloride 0.9%    Home Medications:  Prior to Admission medications    Medication Sig Start Date End Date Taking? Authorizing Provider   albuterol (VENTOLIN HFA) 90 mcg/actuation inhaler Inhale 2 puffs into the lungs every 6 (six) hours as needed for Wheezing. Rescue 4/23/21   Gallo Diaz MD   ARIPiprazole (ABILIFY) 5 MG Tab Take 1 tablet (5 mg  total) by mouth 2 (two) times daily. 4/23/21 4/23/22  Gallo Diaz MD   dulaglutide (TRULICITY) 0.75 mg/0.5 mL pen injector Inject 0.75 mg into the skin every 7 days. After 1 month if tolerating will increase to full dose 11/29/21   Andrew Rodriguez MD   DULoxetine (CYMBALTA) 30 MG capsule Take 1 capsule (30 mg total) by mouth once daily. 1/5/22   Andrew Rodriguez MD   FLUoxetine 60 mg Tab Take 60 mg by mouth once daily.  10/31/20   Historical Provider   folic acid (FOLVITE) 1 MG tablet Take 1 tablet (1 mg total) by mouth once daily. 4/23/21 5/23/21  Gallo Diaz MD   gabapentin (NEURONTIN) 400 MG capsule Take 1 capsule (400 mg total) by mouth 3 (three) times daily. 4/23/21   Gallo Diaz MD   hydrOXYzine pamoate (VISTARIL) 50 MG Cap  6/28/21   Historical Provider   letrozole (FEMARA) 2.5 mg Tab  5/1/21   Historical Provider   levalbuterol (XOPENEX) 0.63 mg/3 mL nebulizer solution  6/28/21   Historical Provider   levothyroxine (SYNTHROID) 25 MCG tablet Take 1 tablet (25 mcg total) by mouth before breakfast. 4/24/21   Gallo Diaz MD   ondansetron (ZOFRAN ODT) 8 MG TbDL Take 1 tablet (8 mg total) by mouth 3 (three) times daily as needed (Nausea). 4/23/21   Gallo Diaz MD   pantoprazole (PROTONIX) 40 MG tablet Take 1 tablet (40 mg total) by mouth once daily. 4/24/21   Gallo Diaz MD   SPIRIVA WITH HANDIHALER 18 mcg inhalation capsule 1 capsule every morning. 12/9/20   Historical Provider   thiamine mononitrate, vit B1, (VITAMIN B-1, MONONITRATE,) 100 mg Tab  6/22/21   Historical Provider   tiZANidine (ZANAFLEX) 4 MG tablet Take 1 tablet (4 mg total) by mouth every 8 (eight) hours. 11/29/21   Andrew Rodriguez MD   traZODone (DESYREL) 100 MG tablet Take 1 tablet (100 mg total) by mouth nightly as needed for Insomnia. 1/5/22   Andrew Rodriguez MD         OBJECTIVE:     EXAMINATION    Vitals:    01/09/22 0819 01/09/22 1139 01/09/22 1200 01/09/22 1344   BP: (!) 170/75 (!) 150/66   "   BP Location: Left arm Left arm     Patient Position: Lying Lying     Pulse: 90 88 87 94   Resp: 18 (!) 23  18   Temp: 98.3 °F (36.8 °C) (!) 100.4 °F (38 °C)     TempSrc: Oral Oral     SpO2: 96% (!) 94% (!) 93% 95%   Weight:       Height:           PAIN   1/10    CONSTITUTIONAL  General Appearance and Manner: Notably tremulous, elderly female lying in bed drowsy but in no acute distress    MUSCULOSKELETAL  Abnormal Involuntary Movements: None  Muscle Strength and Tone:  Did not assess  Gait and Station: Did not assess    PSYCHIATRIC   Orientation: Alert and oriented x 3 to self, place, and year  Speech: Soft volume, monotone  Language: English  Mood: "Okay"  Affect: Flat  Thought Process: Linear  Thought Content: Denies SI, HI, or AVH  Memory: Grossly intact  Attention and Concentration: Grossly intact  Fund of Knowledge: Consistent with level of education  Insight: Poor  Judgment: Fair      Assessment/Plan:     Alcohol dependence, continuous    ASSESSMENT:     DIAGNOSES & PROBLEMS  Alcohol Use Disorder, current, severe  Cannabis Use Disorder   Depressive disorder, Unspecified       STRENGTHS AND LIABILITIES   Strength: Patient has positive support network., Liability: Patient is unstable., Liability: Patient lacks coping skills.      MOTIVATION TO PURSUE RECOVERY: low; not interested in rehab at this time    ACCEPTANCE OF ADDICTION: limited    ABILITY TO ADHERE TO TREATMENT PLAN: low      PLAN:       MANAGEMENT PLAN, TREATMENT GOALS, THERAPEUTIC TECHNIQUES/APPROACHES & CLINICAL REASONING    SCHEDULED MEDICATIONS:   - Continue Valium taper:   · Day 1: 10mg Q8H  · Day 2: 10mg Q12H  · Day 3: 5mg Q12H  · Day 4: 5mg at bedtime    - Per home medications: Can continue home medications (Cymbalta 30mg daily and Trazadone 100mg nightly) but verify with /pharmacy if patient is taking these at home and if also taking reported Pristiq prescription; she has reported different medications per chart review to different " providers and it would be best to verify which medications she has most recently been compliant with in order to better assess re-starting all/proper medications.         · Patient counseled on abstinence from alcohol and substances of abuse (illicit and prescription).  · Additional workup planned to address substance use disorder, in order to guide and refine ongoing management options, includes serial alcohol and drug laboratory testing (e.g. PETH, urine toxicology).  · Relapse prevention and motivational interviewing provided.  · Education provided on 12 step recovery programs.      PRESCRIPTION DRUG MANAGEMENT  - The risks and benefits of medication were discussed with this patient.  - Possible expectable adverse effects of any current or proposed individual psychotropic agents were discussed with this patient.  - Counseling was provided on the importance of full compliance with medication regimens.      In cases of emergency, daily coverage provided by Acute/ER Psych MD, NP, or SW, with contact numbers located in [facility] On Call Schedule    Case discussed with staff addiction psychiatrist: Alex Cortes MD      LABS/IMAGING/STUDIES   Recent Results (from the past 24 hour(s))   PTT    Collection Time: 01/08/22  2:47 PM   Result Value Ref Range    aPTT 23.8 21.0 - 32.0 sec   Brain natriuretic peptide    Collection Time: 01/08/22  2:47 PM   Result Value Ref Range    BNP <10 0 - 99 pg/mL   PT/INR    Collection Time: 01/08/22  2:47 PM   Result Value Ref Range    Prothrombin Time 10.3 9.0 - 12.5 sec    INR 0.9 0.8 - 1.2   D-Dimer, Quantitative    Collection Time: 01/08/22  2:47 PM   Result Value Ref Range    D-Dimer 0.41 <0.50 mg/L FEU   Toxicology screen, urine    Collection Time: 01/08/22  4:15 PM   Result Value Ref Range    Alcohol, Urine 175 (A) <10 mg/dL    Benzodiazepines Presumptive Positive (A) Negative    Methadone metabolites Negative Negative    Cocaine (Metab.) Negative Negative    Opiate  Scrn, Ur Negative Negative    Barbiturate Screen, Ur Negative Negative    Amphetamine Screen, Ur Negative Negative    THC Presumptive Positive (A) Negative    Phencyclidine Negative Negative    Creatinine, Urine 151.0 15.0 - 325.0 mg/dL    Toxicology Information SEE COMMENT    Lactic acid, plasma    Collection Time: 01/08/22  4:39 PM   Result Value Ref Range    Lactate (Lactic Acid) 1.4 0.5 - 2.2 mmol/L   Blood Culture #1 **CANNOT BE ORDERED STAT**    Collection Time: 01/08/22  4:39 PM    Specimen: Peripheral, Antecubital, Right; Blood   Result Value Ref Range    Blood Culture, Routine No Growth to date    Blood Culture #1 **CANNOT BE ORDERED STAT**    Collection Time: 01/08/22  4:39 PM    Specimen: Peripheral, Hand, Left; Blood   Result Value Ref Range    Blood Culture, Routine No Growth to date    Ethanol    Collection Time: 01/08/22  4:39 PM   Result Value Ref Range    Alcohol, Serum 10 (A) <10 mg/dL   Sedimentation rate    Collection Time: 01/08/22  5:33 PM   Result Value Ref Range    Sed Rate 8 0 - 36 mm/Hr   POCT glucose    Collection Time: 01/08/22  6:42 PM   Result Value Ref Range    POCT Glucose 137 (H) 70 - 110 mg/dL   POCT glucose    Collection Time: 01/09/22  2:10 AM   Result Value Ref Range    POCT Glucose 148 (H) 70 - 110 mg/dL   Comprehensive Metabolic Panel (CMP)    Collection Time: 01/09/22  2:39 AM   Result Value Ref Range    Sodium 136 136 - 145 mmol/L    Potassium 3.8 3.5 - 5.1 mmol/L    Chloride 100 95 - 110 mmol/L    CO2 25 23 - 29 mmol/L    Glucose 142 (H) 70 - 110 mg/dL    BUN 9 8 - 23 mg/dL    Creatinine 0.7 0.5 - 1.4 mg/dL    Calcium 8.7 8.7 - 10.5 mg/dL    Total Protein 6.9 6.0 - 8.4 g/dL    Albumin 3.8 3.5 - 5.2 g/dL    Total Bilirubin 0.5 0.1 - 1.0 mg/dL    Alkaline Phosphatase 87 55 - 135 U/L    AST 64 (H) 10 - 40 U/L    ALT 60 (H) 10 - 44 U/L    Anion Gap 11 8 - 16 mmol/L    eGFR if African American >60.0 >60 mL/min/1.73 m^2    eGFR if non African American >60.0 >60 mL/min/1.73 m^2    Magnesium    Collection Time: 01/09/22  2:39 AM   Result Value Ref Range    Magnesium 1.6 1.6 - 2.6 mg/dL   Phosphorus    Collection Time: 01/09/22  2:39 AM   Result Value Ref Range    Phosphorus 3.4 2.7 - 4.5 mg/dL   CBC with Automated Differential    Collection Time: 01/09/22  2:39 AM   Result Value Ref Range    WBC 3.31 (L) 3.90 - 12.70 K/uL    RBC 4.47 4.00 - 5.40 M/uL    Hemoglobin 11.5 (L) 12.0 - 16.0 g/dL    Hematocrit 37.4 37.0 - 48.5 %    MCV 84 82 - 98 fL    MCH 25.7 (L) 27.0 - 31.0 pg    MCHC 30.7 (L) 32.0 - 36.0 g/dL    RDW 15.4 (H) 11.5 - 14.5 %    Platelets 87 (L) 150 - 450 K/uL    MPV 9.8 9.2 - 12.9 fL    Immature Granulocytes 0.6 (H) 0.0 - 0.5 %    Gran # (ANC) 1.5 (L) 1.8 - 7.7 K/uL    Immature Grans (Abs) 0.02 0.00 - 0.04 K/uL    Lymph # 1.7 1.0 - 4.8 K/uL    Mono # 0.2 (L) 0.3 - 1.0 K/uL    Eos # 0.0 0.0 - 0.5 K/uL    Baso # 0.00 0.00 - 0.20 K/uL    nRBC 0 0 /100 WBC    Gran % 44.5 38.0 - 73.0 %    Lymph % 49.8 (H) 18.0 - 48.0 %    Mono % 5.1 4.0 - 15.0 %    Eosinophil % 0.0 0.0 - 8.0 %    Basophil % 0.0 0.0 - 1.9 %    Differential Method Automated    POCT glucose    Collection Time: 01/09/22  7:44 AM   Result Value Ref Range    POCT Glucose 121 (H) 70 - 110 mg/dL   POCT glucose    Collection Time: 01/09/22 12:43 PM   Result Value Ref Range    POCT Glucose 221 (H) 70 - 110 mg/dL      Imaging Results          X-Ray Chest AP Portable (Final result)  Result time 01/08/22 13:33:18    Final result by James Sotelo DO (01/08/22 13:33:18)                 Impression:      Please see above      Electronically signed by: James Sotelo DO  Date:    01/08/2022  Time:    13:33             Narrative:    EXAMINATION:  XR CHEST AP PORTABLE    CLINICAL HISTORY:  COVID-19;    TECHNIQUE:  Single frontal view of the chest was performed.    COMPARISON:  01/08/2022    FINDINGS:  No significant change from prior.  Continued surgical clips overlying the cardiomediastinal silhouette and lower chest wall.  There is no  new lung opacity allowing for differences in technique.  No large pleural effusion or pneumothorax.  Continued atherosclerotic aorta.  Clinical correlation and follow-up advised.                                     Total Time:  60 minutes        Lubna Madrigalechethan Eason MD   Psychiatry: PGY-II Resident    [facility] , Alex Sebastian BECERRA, have reviewed the attached history and exam . Pt examined personally by me on 1-9-22  . The case discussed with the examining psychiatry resident physician . I agree the assessment and recommended treatment plan .

## 2022-01-09 NOTE — SUBJECTIVE & OBJECTIVE
Past Medical History:   Diagnosis Date    Anemia     Anemia     Controlled type 2 diabetes mellitus without complication, without long-term current use of insulin 11/30/2021    Depression     Diverticulitis     Fatty liver     GERD (gastroesophageal reflux disease)     Hyperlipidemia     Hypertension     Pancreatitis     Peptic ulcer disease     Polysubstance abuse     Posterior reversible encephalopathy syndrome     Sarcoidosis of lung     Sarcoidosis of lung     over 30 yrs ago    Seizures     7/2017    Suicide attempt     Suicide ideation        Past Surgical History:   Procedure Laterality Date    APPENDECTOMY      BILATERAL MASTECTOMY Bilateral 10/29/2020    Procedure: MASTECTOMY, BILATERAL;  Surgeon: Baylee Kevin MD;  Location: [address];  Service: General;  Laterality: Bilateral;    BREAST REVISION SURGERY Bilateral 2/11/2021    Procedure: BREAST REVISION SURGERY;  Surgeon: Scottie Johnson MD;  Location: [facility] OR [address] [place] [address];  Service: Plastics;  Laterality: Bilateral;    COLONOSCOPY N/A 7/28/2017    Procedure: COLONOSCOPY;  Surgeon: Aaron Alvarado MD;  Location: [facility];  Service: Endoscopy;  Laterality: N/A;    ESOPHAGOGASTRODUODENOSCOPY  10/7/2016, 11/6/2014    2016 - gastritis, duodenitis, 2014 erosive gastritis    ESOPHAGOGASTRODUODENOSCOPY N/A 2/11/2020    Procedure: ESOPHAGOGASTRODUODENOSCOPY (EGD);  Surgeon: Fawn Garrido MD;  Location: [facility];  Service: Endoscopy;  Laterality: N/A;    ESOPHAGOGASTRODUODENOSCOPY N/A 4/19/2021    Procedure: EGD (ESOPHAGOGASTRODUODENOSCOPY);  Surgeon: Paramjit Martino MD;  Location: [facility];  Service: Endoscopy;  Laterality: N/A;    FLEXIBLE SIGMOIDOSCOPY  11/06/2014    colitis    HYSTERECTOMY      INJECTION FOR SENTINEL NODE IDENTIFICATION Right 10/29/2020    Procedure: INJECTION, FOR SENTINEL NODE IDENTIFICATION;  Surgeon: Baylee Kevin MD;  Location: [address];  Service: General;  Laterality: Right;     INJECTION OF JOINT Right 10/10/2019    Procedure: Injection, Joint RIGHT ILIOPSOAS BURSA/TENDON INJECTION AND RIGHT GLUTEAL TENDON INJECTION WITH STEROID AND LIDOCAINE;  Surgeon: Guillaume Rico MD;  Location: [facility];  Service: Pain Management;  Laterality: Right;  NEEDS CONSENT    INSERTION OF BREAST TISSUE EXPANDER Bilateral 10/29/2020    Procedure: INSERTION, TISSUE EXPANDER, BREAST;  Surgeon: Scottie Johnson MD;  Location: [facility] OR [address] [place] [address];  Service: Plastics;  Laterality: Bilateral;  Right breast: 1082 g  Left breast: 1076 g    LIPOSUCTION Bilateral 2021    Procedure: LIPOSUCTION;  Surgeon: Scottie Johnson MD;  Location: [facility] OR [address] [place] [address];  Service: Plastics;  Laterality: Bilateral;    mediastenoscopy      REPLACEMENT OF IMPLANT OF BREAST Bilateral 2021    Procedure: REPLACEMENT, IMPLANT, BREAST;  Surgeon: Scottie oJhnson MD;  Location: [facility] OR [address] [place] [address];  Service: Plastics;  Laterality: Bilateral;    SENTINEL LYMPH NODE BIOPSY Right 10/29/2020    Procedure: BIOPSY, LYMPH NODE, SENTINEL;  Surgeon: Baylee Kevin MD;  Location: [address];  Service: General;  Laterality: Right;    TONSILLECTOMY N/A 1970    TUBAL LIGATION         Review of patient's allergies indicates:   Allergen Reactions    Lortab  [hydrocodone-acetaminophen] Itching    Promethazine Other (See Comments) and Itching     Other reaction(s): Itching       Family History     Problem Relation (Age of Onset)    Breast cancer Maternal Aunt, Daughter    Colon cancer Maternal Uncle    Diabetes Father, Mother    Heart attack Father    Hypertension Father, Mother        Tobacco Use    Smoking status: Current Every Day Smoker     Packs/day: 0.50     Years: 30.00     Pack years: 15.00     Types: Cigarettes     Last attempt to quit: 2021     Years since quittin.9    Smokeless tobacco: Never Used   Substance and Sexual Activity    Alcohol use: Yes     Comment: daily     Drug use: Yes     Types:  Marijuana     Comment: currently    Sexual activity: Yes     Birth control/protection: Surgical      Review of Systems   Constitutional: Negative for chills and fever.   HENT: Negative for congestion and trouble swallowing.    Eyes: Negative for photophobia and visual disturbance.   Respiratory: Positive for cough and shortness of breath.    Cardiovascular: Negative for chest pain, palpitations and leg swelling.   Gastrointestinal: Positive for abdominal pain (mild cramping) and nausea (mild). Negative for vomiting.   Genitourinary: Negative for decreased urine volume, difficulty urinating and dysuria.   Musculoskeletal: Negative for gait problem and myalgias.   Neurological: Positive for tremors. Negative for dizziness, seizures, syncope, speech difficulty, light-headedness and headaches.   Psychiatric/Behavioral: Negative for agitation, confusion, dysphoric mood and hallucinations. The patient is not nervous/anxious.      Objective:     Vital Signs (Most Recent):  Temp: 98.8 °F (37.1 °C) (01/09/22 1557)  Pulse: 85 (01/09/22 1557)  Resp: 20 (01/09/22 1557)  BP: (!) 187/81 (01/09/22 1557)  SpO2: (!) 94 % (01/09/22 1557) Vital Signs (24h Range):  Temp:  [97.7 °F (36.5 °C)-100.4 °F (38 °C)] 98.8 °F (37.1 °C)  Pulse:  [] 85  Resp:  [16-23] 20  SpO2:  [91 %-98 %] 94 %  BP: (150-187)/(66-84) 187/81   Weight: 88.5 kg (195 lb 1.7 oz)  Body mass index is 31.49 kg/m².      Intake/Output Summary (Last 24 hours) at 1/9/2022 1624  Last data filed at 1/9/2022 0406  Gross per 24 hour   Intake 480 ml   Output --   Net 480 ml       Physical Exam  Vitals and nursing note reviewed.   Constitutional:       General: She is not in acute distress.     Appearance: She is obese. She is not ill-appearing or diaphoretic.   HENT:      Head: Normocephalic and atraumatic.      Mouth/Throat:      Mouth: Mucous membranes are moist.   Eyes:      Extraocular Movements: Extraocular movements intact.      Conjunctiva/sclera: Conjunctivae  normal.      Pupils: Pupils are equal, round, and reactive to light.   Cardiovascular:      Rate and Rhythm: Normal rate and regular rhythm.   Pulmonary:      Effort: Pulmonary effort is normal. No respiratory distress.      Breath sounds: No wheezing.   Abdominal:      General: There is no distension.      Palpations: Abdomen is soft.      Tenderness: There is no abdominal tenderness. There is no guarding.   Musculoskeletal:      Cervical back: Normal range of motion and neck supple. No rigidity.   Skin:     General: Skin is warm and dry.   Neurological:      General: No focal deficit present.      Mental Status: She is alert and oriented to person, place, and time.      Comments: Occasionally dozes off briefly, but awakens to voice and answers all questions appropriately    Psychiatric:         Mood and Affect: Mood normal.         Behavior: Behavior normal.            Lines/Drains/Airways     Peripheral Intravenous Line                 Peripheral IV - Single Lumen 01/08/22 1231 22 G Right Hand 1 day         Peripheral IV - Single Lumen 01/08/22 1457 22 G Right Antecubital 1 day              Significant Labs:    CBC/Anemia Profile:  Recent Labs   Lab 01/08/22  1202 01/09/22  0239   WBC 4.41 3.31*   HGB 12.0 11.5*   HCT 39.4 37.4   PLT 82* 87*   MCV 87 84   RDW 16.0* 15.4*   FERRITIN 36  --         Chemistries:  Recent Labs   Lab 01/08/22  1202 01/09/22  0239    136   K 4.4 3.8    100   CO2 22* 25   BUN 8 9   CREATININE 0.7 0.7   CALCIUM 8.8 8.7   ALBUMIN 4.0 3.8   PROT 6.9 6.9   BILITOT 0.4 0.5   ALKPHOS 70 87   ALT 48* 60*   AST 49* 64*   MG  --  1.6   PHOS  --  3.4       All pertinent labs within the past 24 hours have been reviewed.    Significant Imaging: I have reviewed all pertinent imaging results/findings within the past 24 hours.

## 2022-01-09 NOTE — SIGNIFICANT EVENT
Patient actively smoking vape pen on exam. Patient endorsed she would not stop. Vape pen removed from room and given directly to the charge nurse. Patient also quite tremulous on exam and endorsing half a bottle per day intake. Will redose accordingly \    Charge nurse on Floor 16 WT listed as Jin Mai RN

## 2022-01-09 NOTE — CONSULTS
"[facility] ([place] [address])  Critical Care Medicine  Consult Note    Patient Name: Earl Abdul  MRN: [id number]  Admission Date: 1/8/2022  Hospital Length of Stay: 1 days  Code Status: Full Code  Attending Physician: Christen Proctor MD   Primary Care Provider: Andrew Rodriguez MD   Principal Problem: Acute hypoxemic respiratory failure    Consults  Subjective:     HPI:  62yo F with sarcoidosis, COPD on intermittent home O2 2L NC, GERD, HTN, HLD, T2DM, depression, EtOH abuse with cirrhosis (per pt; follows with hepatology at [facility]) presented with SOB on 1/8/2022. She was found to be covid+ at urgent care and sent to the ED due to hypoxia requiring 4L NC. Admitted to hospital medicine for acute on chronic hypoxemic respiratory failure. Covid therapies started on admission. Hx of EtOH abuse with last drink 1/7 AM. Pt reports hx of EtOH seizures, last "years ago".She was started on scheduled valium 5mg q8h with CIWA protocol + prn IV ativan for EtOH withdrawal on admission.     On 1/9 critical care was consulted for "The patient is tremulous, diaphoretic and hypertensive. Last drink approximately 48 hours ago. Patient with previous history of severe alchol withdrawal.  Patient likely requires closer monitoring for treatment of alcohol withdrawal".    On my arrival to pt's room at around 8:45am pt lying in bed calmly, mildly tremulous in b/l UE, mild nausea with abd cramping but no vomiting. No diaphoresis and not in distress. Received prn 2mg IV ativan shortly before my arrival. HR 90s and BP mildly elevated with SBP 140s-150s. She was A&O x4 with normal mentation. Denied auditory or visual hallucinations. CIWA 11 at that time.      Hospital/ICU Course:  No notes on file    Past Medical History:   Diagnosis Date    Anemia     Anemia     Controlled type 2 diabetes mellitus without complication, without long-term current use of insulin 11/30/2021    Depression     Diverticulitis     Fatty liver  "    GERD (gastroesophageal reflux disease)     Hyperlipidemia     Hypertension     Pancreatitis     Peptic ulcer disease     Polysubstance abuse     Posterior reversible encephalopathy syndrome     Sarcoidosis of lung     Sarcoidosis of lung     over 30 yrs ago    Seizures     7/2017    Suicide attempt     Suicide ideation        Past Surgical History:   Procedure Laterality Date    APPENDECTOMY      BILATERAL MASTECTOMY Bilateral 10/29/2020    Procedure: MASTECTOMY, BILATERAL;  Surgeon: Baylee Kevin MD;  Location: [address];  Service: General;  Laterality: Bilateral;    BREAST REVISION SURGERY Bilateral 2/11/2021    Procedure: BREAST REVISION SURGERY;  Surgeon: Scottie Johnson MD;  Location: [address];  Service: Plastics;  Laterality: Bilateral;    COLONOSCOPY N/A 7/28/2017    Procedure: COLONOSCOPY;  Surgeon: Aaron Alvarado MD;  [facility]: [facility];  Service: Endoscopy;  Laterality: N/A;    ESOPHAGOGASTRODUODENOSCOPY  10/7/2016, 11/6/2014    2016 - gastritis, duodenitis, 2014 erosive gastritis    ESOPHAGOGASTRODUODENOSCOPY N/A 2/11/2020    Procedure: ESOPHAGOGASTRODUODENOSCOPY (EGD);  Surgeon: Fawn Garrido MD;  [facility];  Service: Endoscopy;  Laterality: N/A;    ESOPHAGOGASTRODUODENOSCOPY N/A 4/19/2021    Procedure: EGD (ESOPHAGOGASTRODUODENOSCOPY);  Surgeon: Paramjit Martino MD;  [facility];  Service: Endoscopy;  Laterality: N/A;    FLEXIBLE SIGMOIDOSCOPY  11/06/2014    colitis    HYSTERECTOMY      INJECTION FOR SENTINEL NODE IDENTIFICATION Right 10/29/2020    Procedure: INJECTION, FOR SENTINEL NODE IDENTIFICATION;  Surgeon: Baylee Kevin MD;  Location: [address];  Service: General;  Laterality: Right;    INJECTION OF JOINT Right 10/10/2019    Procedure: Injection, Joint RIGHT ILIOPSOAS BURSA/TENDON INJECTION AND RIGHT GLUTEAL TENDON INJECTION WITH STEROID AND LIDOCAINE;  Surgeon: Guillaume Rico MD;  Location: [facility];  Service:  Pain Management;  Laterality: Right;  NEEDS CONSENT    INSERTION OF BREAST TISSUE EXPANDER Bilateral 10/29/2020    Procedure: INSERTION, TISSUE EXPANDER, BREAST;  Surgeon: Scottie Johnson MD;  [facility];  Service: Plastics;  Laterality: Bilateral;  Right breast: 1082 g  Left breast: 1076 g    LIPOSUCTION Bilateral 2021    Procedure: LIPOSUCTION;  Surgeon: Scottie Johnson MD;  [facility];  Service: Plastics;  Laterality: Bilateral;    mediastenoscopy      REPLACEMENT OF IMPLANT OF BREAST Bilateral 2021    Procedure: REPLACEMENT, IMPLANT, BREAST;  Surgeon: Scottie Johnson MD;  [facility];  Service: Plastics;  Laterality: Bilateral;    SENTINEL LYMPH NODE BIOPSY Right 10/29/2020    Procedure: BIOPSY, LYMPH NODE, SENTINEL;  Surgeon: Baylee Kevin MD;  Location: [facility] OR [address] [place] [address];  Service: General;  Laterality: Right;    TONSILLECTOMY N/A     TUBAL LIGATION         Review of patient's allergies indicates:   Allergen Reactions    Lortab  [hydrocodone-acetaminophen] Itching    Promethazine Other (See Comments) and Itching     Other reaction(s): Itching       Family History     Problem Relation (Age of Onset)    Breast cancer Maternal Aunt, Daughter    Colon cancer Maternal Uncle    Diabetes Father, Mother    Heart attack Father    Hypertension Father, Mother        Tobacco Use    Smoking status: Current Every Day Smoker     Packs/day: 0.50     Years: 30.00     Pack years: 15.00     Types: Cigarettes     Last attempt to quit: 2021     Years since quittin.9    Smokeless tobacco: Never Used   Substance and Sexual Activity    Alcohol use: Yes     Comment: daily     Drug use: Yes     Types: Marijuana     Comment: currently    Sexual activity: Yes     Birth control/protection: Surgical      Review of Systems   Constitutional: Negative for chills and fever.   HENT: Negative for congestion and trouble swallowing.    Eyes:  Negative for photophobia and visual disturbance.   Respiratory: Positive for cough and shortness of breath.    Cardiovascular: Negative for chest pain, palpitations and leg swelling.   Gastrointestinal: Positive for abdominal pain (mild cramping) and nausea (mild). Negative for vomiting.   Genitourinary: Negative for decreased urine volume, difficulty urinating and dysuria.   Musculoskeletal: Negative for gait problem and myalgias.   Neurological: Positive for tremors. Negative for dizziness, seizures, syncope, speech difficulty, light-headedness and headaches.   Psychiatric/Behavioral: Negative for agitation, confusion, dysphoric mood and hallucinations. The patient is not nervous/anxious.      Objective:     Vital Signs (Most Recent):  Temp: 98.8 °F (37.1 °C) (01/09/22 1557)  Pulse: 85 (01/09/22 1557)  Resp: 20 (01/09/22 1557)  BP: (!) 187/81 (01/09/22 1557)  SpO2: (!) 94 % (01/09/22 1557) Vital Signs (24h Range):  Temp:  [97.7 °F (36.5 °C)-100.4 °F (38 °C)] 98.8 °F (37.1 °C)  Pulse:  [] 85  Resp:  [16-23] 20  SpO2:  [91 %-98 %] 94 %  BP: (150-187)/(66-84) 187/81   Weight: 88.5 kg (195 lb 1.7 oz)  Body mass index is 31.49 kg/m².      Intake/Output Summary (Last 24 hours) at 1/9/2022 1624  Last data filed at 1/9/2022 0406  Gross per 24 hour   Intake 480 ml   Output --   Net 480 ml       Physical Exam  Vitals and nursing note reviewed.   Constitutional:       General: She is not in acute distress.     Appearance: She is obese. She is not ill-appearing or diaphoretic.   HENT:      Head: Normocephalic and atraumatic.      Mouth/Throat:      Mouth: Mucous membranes are moist.   Eyes:      Extraocular Movements: Extraocular movements intact.      Conjunctiva/sclera: Conjunctivae normal.      Pupils: Pupils are equal, round, and reactive to light.   Cardiovascular:      Rate and Rhythm: Normal rate and regular rhythm.   Pulmonary:      Effort: Pulmonary effort is normal. No respiratory distress.      Breath sounds:  No wheezing.   Abdominal:      General: There is no distension.      Palpations: Abdomen is soft.      Tenderness: There is no abdominal tenderness. There is no guarding.   Musculoskeletal:      Cervical back: Normal range of motion and neck supple. No rigidity.   Skin:     General: Skin is warm and dry.   Neurological:      General: No focal deficit present.      Mental Status: She is alert and oriented to person, place, and time.      Comments: Occasionally dozes off briefly, but awakens to voice and answers all questions appropriately    Psychiatric:         Mood and Affect: Mood normal.         Behavior: Behavior normal.            Lines/Drains/Airways     Peripheral Intravenous Line                 Peripheral IV - Single Lumen 01/08/22 1231 22 G Right Hand 1 day         Peripheral IV - Single Lumen 01/08/22 1457 22 G Right Antecubital 1 day              Significant Labs:    CBC/Anemia Profile:  Recent Labs   Lab 01/08/22  1202 01/09/22  0239   WBC 4.41 3.31*   HGB 12.0 11.5*   HCT 39.4 37.4   PLT 82* 87*   MCV 87 84   RDW 16.0* 15.4*   FERRITIN 36  --         Chemistries:  Recent Labs   Lab 01/08/22  1202 01/09/22  0239    136   K 4.4 3.8    100   CO2 22* 25   BUN 8 9   CREATININE 0.7 0.7   CALCIUM 8.8 8.7   ALBUMIN 4.0 3.8   PROT 6.9 6.9   BILITOT 0.4 0.5   ALKPHOS 70 87   ALT 48* 60*   AST 49* 64*   MG  --  1.6   PHOS  --  3.4       All pertinent labs within the past 24 hours have been reviewed.    Significant Imaging: I have reviewed all pertinent imaging results/findings within the past 24 hours.      ABG  No results for input(s): PH, PO2, PCO2, HCO3, BE in the last 168 hours.  Assessment/Plan:     Psychiatric  Alcohol withdrawal  62yo F with sarcoidosis, COPD on intermittent home O2 2L NC, GERD, HTN, HLD, T2DM, depression, EtOH abuse with cirrhosis (per pt; follows with hepatology at [facility]) presented with SOB on 1/8/2022 and was admitted for acute on chronic hypoxemic respiratory failure 2/2  "covid. Last drink was approx 24h before admission. Evolving withdraw symptoms and hx of WD seizure "years ago". Critical care consulted for consideration of precedex adminstration.     On my exam pt with mild WD sx (tremors, mild abd cramping and nausea) but otherwise is lucid with normal mentation and behavior. No hallucinations or agitation. Very calmly resting in bed on NC. She was started on Valium 5mg q8h on admission with ativan prn with CIWA protocol. At the time of my eval CIWA was at most 11.     Recommendations:  - Increase scheduled valium to 10 mg TID   - q4h CIWA with prns available; recommend prn indications be objective measures of WD vs subjective sx  - Can increase benzos if needed   - Do not think Precedex or ICU transfer is warranted at this time, please re-consult for CIWA>16       Thank you for your consult. I will sign off. Please contact us if you have any additional questions.     Maura Baker MD  Critical Care Medicine  [facility] ([facility])  "

## 2022-01-09 NOTE — HPI
1/9/2022 1:49 PM  Earl Abdul  1960  [id number]      ADDICTION CONSULT INITIAL EVALUATION     DEPARTMENT:  Psychiatry  SITE: [facility], [address]    DATE OF ADMISSION: 1/8/2022 11:08 AM  LENGTH OF STAY: 1 days    EXAMINING PRACTITIONER: Kari Eason    CONSULT REQUESTED BY: Christen Proctor MD      SUBJECTIVE     CHIEF COMPLAINT  Earl Abdul is a 61 y.o. female who is seen today for an initial psychiatric evaluation by the addiction psychiatry consult service for alcohol withdrawal  Earl Abdul presents with the chief complaint of: SOB and COVID+      HISTORY OF PRESENT ILLNESS    Per Primary MD:  61-year-old female with COPD (per patient), GERD, hyperlipidemia, hypertension, diabetes alcohol withdrawal and depression which presents the emergency room with worsening shortness of breath over the past 3 days.  Patient states that she went to Urgent Care and tested positive for COVID and due to her low oxygen level they advised her to come to the emergency room.  Patient states that she usually wears 3 L of O2 at night but not during the day.  The patient also endorses nausea and dry heaving. In the past 3 days she has had to use oxygen 24 hours a day.  Her  tested positive 3 days ago. On interview the patient endorses a significant recent history of daily alcohol consumption with last drink being approximately 24 hours ago     Per urgent care  The current episode started 1 to 4 weeks ago. Associated symptoms include headaches, myalgias, nasal congestion, postnasal drip and shortness of breath. Pertinent negatives include no chest pain, ear congestion, ear pain, fever, sore throat, sweats, weight loss or wheezing. She has tried nothing for the symptoms. The treatment provided no relief. Her past medical history is significant for COPD.      CXR No significant change from prior.  Continued surgical clips overlying the cardiomediastinal silhouette and lower chest wall.  There  "is no new lung opacity allowing for differences in technique.  No large pleural effusion or pneumothorax.  Continued atherosclerotic aorta.  Clinical correlation and follow-up advised.     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 30 year smoking history. Current drinker with history of alcohol withdrawal       Per Addiction Psych MD:  On initial interview, patient is lying in bed resting. She is calm but notably tremulous and somewhat drowsy. Agrees to interview and evaluation by psychiatry. She reports she came to hospital for SOB due to COVID. When discussing alcohol use, patient states she drinks apprx "a fifth a day"; attributes recent increase in alcohol 2/2 feeling "depressed, especially during the holidays". Reports she was in rehab apprx 6 months ago and was able to stay sober for 4 months after but subsequently relapsed. When discussed rehab in future, she declines and states "I can do it on my own this time". Denies any active SI or HI currently but does report intermittent SI thoughts in past when younger. Denies AVH. Patient lives at home with  who she cites as "very supportive".     Reports pain specialist doctor "Dr. Aguilera" prescribes her Pristiq 30mg which she takes daily. (Of note, on chart review, patient reports other psychiatric medications of Cymbalta and Trazadone). Previously seen in [facility].     On admission: BAL is 175, UDS positive for THC and Benzodiazepines (possibly from ED).   On Interview, patient's vitals are: 92% O2 saturation, 104 HR, and 145/64 BP.     COLLATERAL  None    SUBSTANCE ABUSE HISTORY  Substance(s) of Choice: Alcohol, THC   Substances Used: Alcohol  History of IVDU?: Denies  Use of Alcohol: Yes  Average " "Consumption: 1/5th liquor/day  Last Drink: Prior to ED Presentation (1 day ago)  Use of Medications for Alcohol/Opioid Use Disorder: Denies  History of Complicated Withdrawal: Yes, confirms hx of seizure  History of Detox: Yes   Rehab History: Yes apprx 6 months ago  AA/NA involvement: Yes per chart review  Tobacco: Reports 3 cigarettes a day and vaping  Spouse/Partner Consumption: Yes, reports some social consumption (states "outside the house") of alcohol by   Patient Aware of Biomedical Complications: Yes    DSM-5 SUBSTANCE USE DISORDER CRITERIA   Mild (1-3), Moderate (4-5), Severe (?6)  Often take in larger amounts or over a longer period of time than was intended.  Persistent desire or unsuccessful efforts to cut down or control use.  Great deal of time spent in activities necessary to obtain substance, use, or recover from effects.  Craving/strong desire for substance or urge to use.  Use resulting in failure to fulfill major role obligations at home, work or school.  Social, occupational, recreational activities decreased because of use.  Continued use despite having persistent or recurrent social or interpersonal problems cause or exaserbated by the substance.  Recurrent use in situations in which it is physically hazardous.  Use despite physical or psychological problems that are likely to have been caused or exacerbated by the substance.  Tolerance, as defined by either of the following.   A. A need for markedly increased amounts of substance to achieve intoxication or desired effect. -OR-    B. A markedly diminished effect with continued use of the same amount of substance.  Withdrawal, as manifested by the following.   A. The characteristic withdrawal syndrome for substance. -AND-   B. Substance is taken to relieve or avoid withdrawal symptoms.    ARE THE CRITERIA MET FOR DSM-5 SUBSTANCE USE DISORDER: Severe      PSYCHIATRIC HISTORY  Reported Diagnose(s): Depression, Anxiety  Previous Medication " "Trials: Pristiq, Cymbalta, Trazadone  Previous Psychiatric Hospitalizations: Denies  Outpatient Psychiatrist?: Denies; reports Pain specialist "Dr Aguilera" rx medications?; Per chart review, patient was previously seen in [facility] by Dr. Cortes       SUICIDE/HOMICIDE RISK ASSESSMENT  Current/active suicidal ideation/plan/intent: No  Previous suicide attempts: Yes, "many years ago"  Current/active homicidal ideation/plan/intent: No      HISTORY OF TRAUMA, ABUSE & VIOLENCE  Trauma: Unable to assess  Physical Abuse: Unable to assess  Sexual Abuse: Unable to Assess  Violent Conduct: Denies    Access to Gun: Denies       FAMILY PSYCHIATRIC HISTORY   Unable to Assess       SOCIAL HISTORY  Lives with   Is an Artist   Had 2 children (1 )  Cites tobacco use (3 cigarettes/day for 30+ years) and vaping (nicotine and cannabis)  Confirms daily EtOH Use (1/5th liquor/day)        PAST MEDICAL HISTORY   GERD, HLD, HTN, DM, EtOH Use Disorder and Withdrawal      PSYCHOSOCIAL FACTORS  Stressors (Psychosocial and Environmental): drug and alcohol.         "

## 2022-01-09 NOTE — PLAN OF CARE
Problem: Adult Inpatient Plan of Care  Goal: Plan of Care Review  Outcome: Ongoing, Progressing  Goal: Patient-Specific Goal (Individualized)  Outcome: Ongoing, Progressing  Goal: Absence of Hospital-Acquired Illness or Injury  Outcome: Ongoing, Progressing  Goal: Optimal Comfort and Wellbeing  Outcome: Ongoing, Progressing  Goal: Readiness for Transition of Care  Outcome: Ongoing, Progressing     Problem: Diabetes Comorbidity  Goal: Blood Glucose Level Within Targeted Range  Outcome: Ongoing, Progressing     Problem: Gas Exchange Impaired  Goal: Optimal Gas Exchange  Outcome: Ongoing, Progressing       Pt AO x 4  on 4L NC maintaining O2 sats >88%. VS monitored, pt remains afebrile. BG monitored this shift, WNL. Pain management plan reviewed with pt, pt verbalized understanding. CIWA performed q4. Medication administered per MD order. Safety rounds performed q1-2 hours this shift, bed locked, side rails up x 2, call light in reach. No acute issues observed at this time.  Covid precautions maintained, care clustered throughout shift, education on isolation safety given to pt, pt verbalized understanding, IS encouraged.

## 2022-01-09 NOTE — NURSING
Pt admitted to [address] [place] [address]. Arrived via stretcher with transport staff. Pt belongs at bedside. No acute issues observed at this time. Covid precautions maintained, care clustered throughout shift, education on isolation safety given to pt, pt verbalized understanding, IS encouraged.

## 2022-01-09 NOTE — ASSESSMENT & PLAN NOTE
ASSESSMENT:     DIAGNOSES & PROBLEMS  Alcohol Use Disorder, current, severe  Cannabis Use Disorder   Depressive disorder, Unspecified       STRENGTHS AND LIABILITIES   Strength: Patient has positive support network., Liability: Patient is unstable., Liability: Patient lacks coping skills.      MOTIVATION TO PURSUE RECOVERY: low; not interested in rehab at this time    ACCEPTANCE OF ADDICTION: limited    ABILITY TO ADHERE TO TREATMENT PLAN: low      PLAN:       MANAGEMENT PLAN, TREATMENT GOALS, THERAPEUTIC TECHNIQUES/APPROACHES & CLINICAL REASONING    SCHEDULED MEDICATIONS:   - Continue Valium taper:   · Day 1: 10mg Q8H  · Day 2: 10mg Q12H  · Day 3: 5mg Q12H  · Day 4: 5mg at bedtime    - Per home medications: Can continue home medications (Cymbalta 30mg daily and Trazadone 100mg nightly) but verify with /pharmacy if patient is taking these at home and if also taking reported Pristiq prescription; she has reported different medications per chart review to different providers and it would be best to verify which medications she has most recently been compliant with in order to better assess re-starting all/proper medications.         · Patient counseled on abstinence from alcohol and substances of abuse (illicit and prescription).  · Additional workup planned to address substance use disorder, in order to guide and refine ongoing management options, includes serial alcohol and drug laboratory testing (e.g. PETH, urine toxicology).  · Relapse prevention and motivational interviewing provided.  · Education provided on 12 step recovery programs.      PRESCRIPTION DRUG MANAGEMENT  - The risks and benefits of medication were discussed with this patient.  - Possible expectable adverse effects of any current or proposed individual psychotropic agents were discussed with this patient.  - Counseling was provided on the importance of full compliance with medication regimens.      In cases of emergency, daily coverage  provided by Acute/ER Psych MD, NP, or SW, with contact numbers located in [facility] On Call Schedule    Case discussed with staff addiction psychiatrist: Alex Cortes MD      LABS/IMAGING/STUDIES   Recent Results (from the past 24 hour(s))   PTT    Collection Time: 01/08/22  2:47 PM   Result Value Ref Range    aPTT 23.8 21.0 - 32.0 sec   Brain natriuretic peptide    Collection Time: 01/08/22  2:47 PM   Result Value Ref Range    BNP <10 0 - 99 pg/mL   PT/INR    Collection Time: 01/08/22  2:47 PM   Result Value Ref Range    Prothrombin Time 10.3 9.0 - 12.5 sec    INR 0.9 0.8 - 1.2   D-Dimer, Quantitative    Collection Time: 01/08/22  2:47 PM   Result Value Ref Range    D-Dimer 0.41 <0.50 mg/L FEU   Toxicology screen, urine    Collection Time: 01/08/22  4:15 PM   Result Value Ref Range    Alcohol, Urine 175 (A) <10 mg/dL    Benzodiazepines Presumptive Positive (A) Negative    Methadone metabolites Negative Negative    Cocaine (Metab.) Negative Negative    Opiate Scrn, Ur Negative Negative    Barbiturate Screen, Ur Negative Negative    Amphetamine Screen, Ur Negative Negative    THC Presumptive Positive (A) Negative    Phencyclidine Negative Negative    Creatinine, Urine 151.0 15.0 - 325.0 mg/dL    Toxicology Information SEE COMMENT    Lactic acid, plasma    Collection Time: 01/08/22  4:39 PM   Result Value Ref Range    Lactate (Lactic Acid) 1.4 0.5 - 2.2 mmol/L   Blood Culture #1 **CANNOT BE ORDERED STAT**    Collection Time: 01/08/22  4:39 PM    Specimen: Peripheral, Antecubital, Right; Blood   Result Value Ref Range    Blood Culture, Routine No Growth to date    Blood Culture #1 **CANNOT BE ORDERED STAT**    Collection Time: 01/08/22  4:39 PM    Specimen: Peripheral, Hand, Left; Blood   Result Value Ref Range    Blood Culture, Routine No Growth to date    Ethanol    Collection Time: 01/08/22  4:39 PM   Result Value Ref Range    Alcohol, Serum 10 (A) <10 mg/dL   Sedimentation rate    Collection Time:  01/08/22  5:33 PM   Result Value Ref Range    Sed Rate 8 0 - 36 mm/Hr   POCT glucose    Collection Time: 01/08/22  6:42 PM   Result Value Ref Range    POCT Glucose 137 (H) 70 - 110 mg/dL   POCT glucose    Collection Time: 01/09/22  2:10 AM   Result Value Ref Range    POCT Glucose 148 (H) 70 - 110 mg/dL   Comprehensive Metabolic Panel (CMP)    Collection Time: 01/09/22  2:39 AM   Result Value Ref Range    Sodium 136 136 - 145 mmol/L    Potassium 3.8 3.5 - 5.1 mmol/L    Chloride 100 95 - 110 mmol/L    CO2 25 23 - 29 mmol/L    Glucose 142 (H) 70 - 110 mg/dL    BUN 9 8 - 23 mg/dL    Creatinine 0.7 0.5 - 1.4 mg/dL    Calcium 8.7 8.7 - 10.5 mg/dL    Total Protein 6.9 6.0 - 8.4 g/dL    Albumin 3.8 3.5 - 5.2 g/dL    Total Bilirubin 0.5 0.1 - 1.0 mg/dL    Alkaline Phosphatase 87 55 - 135 U/L    AST 64 (H) 10 - 40 U/L    ALT 60 (H) 10 - 44 U/L    Anion Gap 11 8 - 16 mmol/L    eGFR if African American >60.0 >60 mL/min/1.73 m^2    eGFR if non African American >60.0 >60 mL/min/1.73 m^2   Magnesium    Collection Time: 01/09/22  2:39 AM   Result Value Ref Range    Magnesium 1.6 1.6 - 2.6 mg/dL   Phosphorus    Collection Time: 01/09/22  2:39 AM   Result Value Ref Range    Phosphorus 3.4 2.7 - 4.5 mg/dL   CBC with Automated Differential    Collection Time: 01/09/22  2:39 AM   Result Value Ref Range    WBC 3.31 (L) 3.90 - 12.70 K/uL    RBC 4.47 4.00 - 5.40 M/uL    Hemoglobin 11.5 (L) 12.0 - 16.0 g/dL    Hematocrit 37.4 37.0 - 48.5 %    MCV 84 82 - 98 fL    MCH 25.7 (L) 27.0 - 31.0 pg    MCHC 30.7 (L) 32.0 - 36.0 g/dL    RDW 15.4 (H) 11.5 - 14.5 %    Platelets 87 (L) 150 - 450 K/uL    MPV 9.8 9.2 - 12.9 fL    Immature Granulocytes 0.6 (H) 0.0 - 0.5 %    Gran # (ANC) 1.5 (L) 1.8 - 7.7 K/uL    Immature Grans (Abs) 0.02 0.00 - 0.04 K/uL    Lymph # 1.7 1.0 - 4.8 K/uL    Mono # 0.2 (L) 0.3 - 1.0 K/uL    Eos # 0.0 0.0 - 0.5 K/uL    Baso # 0.00 0.00 - 0.20 K/uL    nRBC 0 0 /100 WBC    Gran % 44.5 38.0 - 73.0 %    Lymph % 49.8 (H) 18.0 - 48.0  %    Mono % 5.1 4.0 - 15.0 %    Eosinophil % 0.0 0.0 - 8.0 %    Basophil % 0.0 0.0 - 1.9 %    Differential Method Automated    POCT glucose    Collection Time: 01/09/22  7:44 AM   Result Value Ref Range    POCT Glucose 121 (H) 70 - 110 mg/dL   POCT glucose    Collection Time: 01/09/22 12:43 PM   Result Value Ref Range    POCT Glucose 221 (H) 70 - 110 mg/dL      Imaging Results          X-Ray Chest AP Portable (Final result)  Result time 01/08/22 13:33:18    Final result by James Sotelo DO (01/08/22 13:33:18)                 Impression:      Please see above      Electronically signed by: James Sotelo DO  Date:    01/08/2022  Time:    13:33             Narrative:    EXAMINATION:  XR CHEST AP PORTABLE    CLINICAL HISTORY:  COVID-19;    TECHNIQUE:  Single frontal view of the chest was performed.    COMPARISON:  01/08/2022    FINDINGS:  No significant change from prior.  Continued surgical clips overlying the cardiomediastinal silhouette and lower chest wall.  There is no new lung opacity allowing for differences in technique.  No large pleural effusion or pneumothorax.  Continued atherosclerotic aorta.  Clinical correlation and follow-up advised.

## 2022-01-09 NOTE — SUBJECTIVE & OBJECTIVE
PSYCHIATRIC ROS  Psychological ROS: positive for - depression, hostility, irritability and sleep disturbances  negative for - disorientation, hallucinations, memory difficulties or suicidal ideation      MEDICAL ROS  Complete review of systems performed covering Constitutional, Eyes, ENT/Mouth, Cardiovascular, Respiratory, Gastrointestinal, Genitourinary, Musculoskeletal, Skin, Neurologic, Endocrine, Heme/Lymph, and Allergy/Immune.     Complete review of systems was negative with the exception of the following positive symptoms: dyspnea      ALLERGIES  Lortab  [hydrocodone-acetaminophen] and Promethazine      MEDICATIONS  Psychotropics:    diazePAM tablet 10 mg, 10 mg, Oral, Q6H,     DULoxetine DR capsule 30 mg, 30 mg, Oral, Daily    lorazepam injection 2 mg, 2 mg, Intravenous, Q2H PRN,     LORazepam tablet 2 mg, 2 mg, Oral, Q4H PRN    traZODone tablet 100 mg, 100 mg, Oral, QHS    Of note, patient also reports use of Pristiq 30mg daily?     Infusions:  None      Scheduled:   albuterol  2 puff Inhalation Q6H    ascorbic acid (vitamin C)  500 mg Oral BID    dexAMETHasone  6 mg Oral Daily    diazePAM  10 mg Oral Q6H    DULoxetine  30 mg Oral Daily    fluticasone furoate-vilanteroL  1 puff Inhalation Daily    folic acid  1 mg Oral Daily    insulin detemir U-100  2 Units Subcutaneous QHS    levothyroxine  25 mcg Oral Before breakfast    multivitamin  1 tablet Oral Daily    pantoprazole  40 mg Oral Daily    remdesivir infusion  100 mg Intravenous Daily    traZODone  100 mg Oral QHS       PRN:  sodium chloride 0.9%, acetaminophen, benzonatate, dextrose 50%, dextrose 50%, glucagon (human recombinant), glucose, glucose, insulin aspart U-100, lorazepam, LORazepam, naloxone, sodium chloride 0.9%    Home Medications:  Prior to Admission medications    Medication Sig Start Date End Date Taking? Authorizing Provider   albuterol (VENTOLIN HFA) 90 mcg/actuation inhaler Inhale 2 puffs into the lungs every 6 (six)  hours as needed for Wheezing. Rescue 4/23/21   Gallo Diaz MD   ARIPiprazole (ABILIFY) 5 MG Tab Take 1 tablet (5 mg total) by mouth 2 (two) times daily. 4/23/21 4/23/22  Gallo Diaz MD   dulaglutide (TRULICITY) 0.75 mg/0.5 mL pen injector Inject 0.75 mg into the skin every 7 days. After 1 month if tolerating will increase to full dose 11/29/21   Andrew Rodriguez MD   DULoxetine (CYMBALTA) 30 MG capsule Take 1 capsule (30 mg total) by mouth once daily. 1/5/22   Andrew Rodriguez MD   FLUoxetine 60 mg Tab Take 60 mg by mouth once daily.  10/31/20   Historical Provider   folic acid (FOLVITE) 1 MG tablet Take 1 tablet (1 mg total) by mouth once daily. 4/23/21 5/23/21  Gallo Diaz MD   gabapentin (NEURONTIN) 400 MG capsule Take 1 capsule (400 mg total) by mouth 3 (three) times daily. 4/23/21   Gallo Diaz MD   hydrOXYzine pamoate (VISTARIL) 50 MG Cap  6/28/21   Historical Provider   letrozole (FEMARA) 2.5 mg Tab  5/1/21   Historical Provider   levalbuterol (XOPENEX) 0.63 mg/3 mL nebulizer solution  6/28/21   Historical Provider   levothyroxine (SYNTHROID) 25 MCG tablet Take 1 tablet (25 mcg total) by mouth before breakfast. 4/24/21   Gallo Diaz MD   ondansetron (ZOFRAN ODT) 8 MG TbDL Take 1 tablet (8 mg total) by mouth 3 (three) times daily as needed (Nausea). 4/23/21   Gallo Diaz MD   pantoprazole (PROTONIX) 40 MG tablet Take 1 tablet (40 mg total) by mouth once daily. 4/24/21   Gallo Diaz MD   SPIRIVA WITH HANDIHALER 18 mcg inhalation capsule 1 capsule every morning. 12/9/20   Historical Provider   thiamine mononitrate, vit B1, (VITAMIN B-1, MONONITRATE,) 100 mg Tab  6/22/21   Historical Provider   tiZANidine (ZANAFLEX) 4 MG tablet Take 1 tablet (4 mg total) by mouth every 8 (eight) hours. 11/29/21   Andrew Rodriguez MD   traZODone (DESYREL) 100 MG tablet Take 1 tablet (100 mg total) by mouth nightly as needed for Insomnia. 1/5/22   Andrew Rodriguez MD  "        OBJECTIVE:     EXAMINATION    Vitals:    01/09/22 0819 01/09/22 1139 01/09/22 1200 01/09/22 1344   BP: (!) 170/75 (!) 150/66     BP Location: Left arm Left arm     Patient Position: Lying Lying     Pulse: 90 88 87 94   Resp: 18 (!) 23  18   Temp: 98.3 °F (36.8 °C) (!) 100.4 °F (38 °C)     TempSrc: Oral Oral     SpO2: 96% (!) 94% (!) 93% 95%   Weight:       Height:           PAIN   1/10    CONSTITUTIONAL  General Appearance and Manner: Notably tremulous, elderly female lying in bed drowsy but in no acute distress    MUSCULOSKELETAL  Abnormal Involuntary Movements: None  Muscle Strength and Tone:  Did not assess  Gait and Station: Did not assess    PSYCHIATRIC   Orientation: Alert and oriented x 3 to self, place, and year  Speech: Soft volume, monotone  Language: English  Mood: "Okay"  Affect: Flat  Thought Process: Linear  Thought Content: Denies SI, HI, or AVH  Memory: Grossly intact  Attention and Concentration: Grossly intact  Fund of Knowledge: Consistent with level of education  Insight: Poor  Judgment: Fair    "

## 2022-01-10 LAB
ALBUMIN SERPL BCP-MCNC: 3.8 G/DL (ref 3.5–5.2)
ALP SERPL-CCNC: 69 U/L (ref 55–135)
ALT SERPL W/O P-5'-P-CCNC: 54 U/L (ref 10–44)
ANION GAP SERPL CALC-SCNC: 11 MMOL/L (ref 8–16)
AST SERPL-CCNC: 44 U/L (ref 10–40)
BASOPHILS # BLD AUTO: 0.01 K/UL (ref 0–0.2)
BASOPHILS NFR BLD: 0.2 % (ref 0–1.9)
BILIRUB SERPL-MCNC: 0.4 MG/DL (ref 0.1–1)
BUN SERPL-MCNC: 11 MG/DL (ref 8–23)
CALCIUM SERPL-MCNC: 8.9 MG/DL (ref 8.7–10.5)
CHLORIDE SERPL-SCNC: 100 MMOL/L (ref 95–110)
CO2 SERPL-SCNC: 26 MMOL/L (ref 23–29)
CREAT SERPL-MCNC: 0.8 MG/DL (ref 0.5–1.4)
DIFFERENTIAL METHOD: ABNORMAL
EOSINOPHIL # BLD AUTO: 0 K/UL (ref 0–0.5)
EOSINOPHIL NFR BLD: 0 % (ref 0–8)
ERYTHROCYTE [DISTWIDTH] IN BLOOD BY AUTOMATED COUNT: 15.8 % (ref 11.5–14.5)
EST. GFR  (AFRICAN AMERICAN): >60 ML/MIN/1.73 M^2
EST. GFR  (NON AFRICAN AMERICAN): >60 ML/MIN/1.73 M^2
GLUCOSE SERPL-MCNC: 112 MG/DL (ref 70–110)
HCT VFR BLD AUTO: 38.7 % (ref 37–48.5)
HGB BLD-MCNC: 11.7 G/DL (ref 12–16)
IMM GRANULOCYTES # BLD AUTO: 0.02 K/UL (ref 0–0.04)
IMM GRANULOCYTES NFR BLD AUTO: 0.4 % (ref 0–0.5)
LYMPHOCYTES # BLD AUTO: 2.4 K/UL (ref 1–4.8)
LYMPHOCYTES NFR BLD: 46.7 % (ref 18–48)
MAGNESIUM SERPL-MCNC: 1.7 MG/DL (ref 1.6–2.6)
MCH RBC QN AUTO: 25.7 PG (ref 27–31)
MCHC RBC AUTO-ENTMCNC: 30.2 G/DL (ref 32–36)
MCV RBC AUTO: 85 FL (ref 82–98)
MONOCYTES # BLD AUTO: 0.5 K/UL (ref 0.3–1)
MONOCYTES NFR BLD: 10.7 % (ref 4–15)
NEUTROPHILS # BLD AUTO: 2.1 K/UL (ref 1.8–7.7)
NEUTROPHILS NFR BLD: 42 % (ref 38–73)
NRBC BLD-RTO: 0 /100 WBC
PHOSPHATE SERPL-MCNC: 2.6 MG/DL (ref 2.7–4.5)
PLATELET # BLD AUTO: 112 K/UL (ref 150–450)
PMV BLD AUTO: 10.4 FL (ref 9.2–12.9)
POCT GLUCOSE: 117 MG/DL (ref 70–110)
POCT GLUCOSE: 171 MG/DL (ref 70–110)
POCT GLUCOSE: 206 MG/DL (ref 70–110)
POCT GLUCOSE: 211 MG/DL (ref 70–110)
POCT GLUCOSE: 90 MG/DL (ref 70–110)
POTASSIUM SERPL-SCNC: 3.6 MMOL/L (ref 3.5–5.1)
PROT SERPL-MCNC: 6.5 G/DL (ref 6–8.4)
RBC # BLD AUTO: 4.56 M/UL (ref 4–5.4)
SODIUM SERPL-SCNC: 137 MMOL/L (ref 136–145)
WBC # BLD AUTO: 5.03 K/UL (ref 3.9–12.7)

## 2022-01-10 PROCEDURE — 25000003 PHARM REV CODE 250: Performed by: STUDENT IN AN ORGANIZED HEALTH CARE EDUCATION/TRAINING PROGRAM

## 2022-01-10 PROCEDURE — 27000207 HC ISOLATION

## 2022-01-10 PROCEDURE — 94761 N-INVAS EAR/PLS OXIMETRY MLT: CPT

## 2022-01-10 PROCEDURE — 94640 AIRWAY INHALATION TREATMENT: CPT

## 2022-01-10 PROCEDURE — 27000221 HC OXYGEN, UP TO 24 HOURS

## 2022-01-10 PROCEDURE — 99900035 HC TECH TIME PER 15 MIN (STAT)

## 2022-01-10 PROCEDURE — 36415 COLL VENOUS BLD VENIPUNCTURE: CPT | Performed by: STUDENT IN AN ORGANIZED HEALTH CARE EDUCATION/TRAINING PROGRAM

## 2022-01-10 PROCEDURE — 25000003 PHARM REV CODE 250: Performed by: INTERNAL MEDICINE

## 2022-01-10 PROCEDURE — 63600175 PHARM REV CODE 636 W HCPCS: Performed by: STUDENT IN AN ORGANIZED HEALTH CARE EDUCATION/TRAINING PROGRAM

## 2022-01-10 PROCEDURE — 12000002 HC ACUTE/MED SURGE SEMI-PRIVATE ROOM

## 2022-01-10 PROCEDURE — 99233 PR SUBSEQUENT HOSPITAL CARE,LEVL III: ICD-10-PCS | Mod: ,,, | Performed by: INTERNAL MEDICINE

## 2022-01-10 PROCEDURE — 84100 ASSAY OF PHOSPHORUS: CPT | Performed by: STUDENT IN AN ORGANIZED HEALTH CARE EDUCATION/TRAINING PROGRAM

## 2022-01-10 PROCEDURE — 80053 COMPREHEN METABOLIC PANEL: CPT | Performed by: STUDENT IN AN ORGANIZED HEALTH CARE EDUCATION/TRAINING PROGRAM

## 2022-01-10 PROCEDURE — 99232 SBSQ HOSP IP/OBS MODERATE 35: CPT | Mod: ,,, | Performed by: PSYCHIATRY & NEUROLOGY

## 2022-01-10 PROCEDURE — 83735 ASSAY OF MAGNESIUM: CPT | Performed by: STUDENT IN AN ORGANIZED HEALTH CARE EDUCATION/TRAINING PROGRAM

## 2022-01-10 PROCEDURE — 99232 PR SUBSEQUENT HOSPITAL CARE,LEVL II: ICD-10-PCS | Mod: ,,, | Performed by: PSYCHIATRY & NEUROLOGY

## 2022-01-10 PROCEDURE — 85025 COMPLETE CBC W/AUTO DIFF WBC: CPT | Performed by: STUDENT IN AN ORGANIZED HEALTH CARE EDUCATION/TRAINING PROGRAM

## 2022-01-10 PROCEDURE — 99233 SBSQ HOSP IP/OBS HIGH 50: CPT | Mod: ,,, | Performed by: INTERNAL MEDICINE

## 2022-01-10 RX ORDER — ENOXAPARIN SODIUM 100 MG/ML
90 INJECTION SUBCUTANEOUS EVERY 12 HOURS
Status: DISCONTINUED | OUTPATIENT
Start: 2022-01-10 | End: 2022-01-14 | Stop reason: HOSPADM

## 2022-01-10 RX ORDER — MUPIROCIN 20 MG/G
OINTMENT TOPICAL 2 TIMES DAILY
Status: DISCONTINUED | OUTPATIENT
Start: 2022-01-10 | End: 2022-01-14 | Stop reason: HOSPADM

## 2022-01-10 RX ORDER — HYDRALAZINE HYDROCHLORIDE 10 MG/1
10 TABLET, FILM COATED ORAL EVERY 6 HOURS PRN
Status: DISCONTINUED | OUTPATIENT
Start: 2022-01-10 | End: 2022-01-14 | Stop reason: HOSPADM

## 2022-01-10 RX ADMIN — OXYCODONE HYDROCHLORIDE AND ACETAMINOPHEN 500 MG: 500 TABLET ORAL at 09:01

## 2022-01-10 RX ADMIN — DIAZEPAM 10 MG: 5 TABLET ORAL at 09:01

## 2022-01-10 RX ADMIN — REMDESIVIR 100 MG: 100 INJECTION, POWDER, LYOPHILIZED, FOR SOLUTION INTRAVENOUS at 08:01

## 2022-01-10 RX ADMIN — LORAZEPAM 2 MG: 2 INJECTION INTRAMUSCULAR; INTRAVENOUS at 09:01

## 2022-01-10 RX ADMIN — SODIUM CHLORIDE: 0.9 INJECTION, SOLUTION INTRAVENOUS at 08:01

## 2022-01-10 RX ADMIN — DEXAMETHASONE 6 MG: 4 TABLET ORAL at 08:01

## 2022-01-10 RX ADMIN — LORAZEPAM 2 MG: 2 INJECTION INTRAMUSCULAR; INTRAVENOUS at 12:01

## 2022-01-10 RX ADMIN — FLUTICASONE FUROATE AND VILANTEROL TRIFENATATE 1 PUFF: 100; 25 POWDER RESPIRATORY (INHALATION) at 08:01

## 2022-01-10 RX ADMIN — DIAZEPAM 10 MG: 5 TABLET ORAL at 06:01

## 2022-01-10 RX ADMIN — PANTOPRAZOLE SODIUM 40 MG: 40 TABLET, DELAYED RELEASE ORAL at 08:01

## 2022-01-10 RX ADMIN — INSULIN DETEMIR 2 UNITS: 100 INJECTION, SOLUTION SUBCUTANEOUS at 09:01

## 2022-01-10 RX ADMIN — ALBUTEROL SULFATE 2 PUFF: 108 INHALANT RESPIRATORY (INHALATION) at 01:01

## 2022-01-10 RX ADMIN — ALBUTEROL SULFATE 2 PUFF: 108 INHALANT RESPIRATORY (INHALATION) at 08:01

## 2022-01-10 RX ADMIN — MUPIROCIN: 20 OINTMENT TOPICAL at 09:01

## 2022-01-10 RX ADMIN — HYDRALAZINE HYDROCHLORIDE 10 MG: 10 TABLET, FILM COATED ORAL at 02:01

## 2022-01-10 RX ADMIN — OXYCODONE HYDROCHLORIDE AND ACETAMINOPHEN 500 MG: 500 TABLET ORAL at 08:01

## 2022-01-10 RX ADMIN — LEVOTHYROXINE SODIUM 25 MCG: 0.03 TABLET ORAL at 06:01

## 2022-01-10 RX ADMIN — FOLIC ACID 1 MG: 1 TABLET ORAL at 08:01

## 2022-01-10 RX ADMIN — ACETAMINOPHEN 1000 MG: 500 TABLET ORAL at 06:01

## 2022-01-10 RX ADMIN — LORAZEPAM 2 MG: 2 INJECTION INTRAMUSCULAR; INTRAVENOUS at 05:01

## 2022-01-10 RX ADMIN — INSULIN ASPART 2 UNITS: 100 INJECTION, SOLUTION INTRAVENOUS; SUBCUTANEOUS at 12:01

## 2022-01-10 RX ADMIN — LORAZEPAM 2 MG: 2 INJECTION INTRAMUSCULAR; INTRAVENOUS at 08:01

## 2022-01-10 RX ADMIN — LORAZEPAM 2 MG: 2 INJECTION INTRAMUSCULAR; INTRAVENOUS at 01:01

## 2022-01-10 RX ADMIN — ENOXAPARIN SODIUM 90 MG: 100 INJECTION, SOLUTION INTRAVENOUS; SUBCUTANEOUS at 09:01

## 2022-01-10 RX ADMIN — DULOXETINE 30 MG: 30 CAPSULE, DELAYED RELEASE ORAL at 08:01

## 2022-01-10 RX ADMIN — TRAZODONE HYDROCHLORIDE 100 MG: 100 TABLET ORAL at 09:01

## 2022-01-10 RX ADMIN — MULTIPLE VITAMINS W/ MINERALS TAB 1 TABLET: TAB at 08:01

## 2022-01-10 NOTE — ASSESSMENT & PLAN NOTE
Will monitor for signs of withdrawal. History of alcohol withdrawal in the past. Endorses daily drinking.    Tremulous and tachycardic on exam. States she feels like she is going to withdraw.     -Valium Taper per addiction psychiatry  -Follow ups as listed with addiction psychiatry  - Continue to monitor for signs of escalating withdrawal

## 2022-01-10 NOTE — PROGRESS NOTES
[facility] ([address] Medicine  Progress Note    Patient Name: Earl Abdul  MRN: [id number]  Patient Class: IP- Inpatient   Admission Date: 1/8/2022  Length of Stay: 2 days  Attending Physician: Christen Proctor MD  Primary Care Provider: Andrew Rodriguez MD        Subjective:     Principal Problem:Acute hypoxemic respiratory failure        HPI:      61-year-old female with COPD (per patient), GERD, hyperlipidemia, hypertension, diabetes alcohol withdrawal and depression which presents the emergency room with worsening shortness of breath over the past 3 days.  Patient states that she went to Urgent Care and tested positive for COVID and due to her low oxygen level they advised her to come to the emergency room.  Patient states that she usually wears 3 L of O2 at night but not during the day.  The patient also endorses nausea and dry heaving. In the past 3 days she has had to use oxygen 24 hours a day.  Her  tested positive 3 days ago. On interview the patient endorses a significant recent history of daily alcohol consumption with last drink being approximately 24 hours ago    Per urgent care  The current episode started 1 to 4 weeks ago. Associated symptoms include headaches, myalgias, nasal congestion, postnasal drip and shortness of breath. Pertinent negatives include no chest pain, ear congestion, ear pain, fever, sore throat, sweats, weight loss or wheezing. She has tried nothing for the symptoms. The treatment provided no relief. Her past medical history is significant for COPD.     CXR No significant change from prior.  Continued surgical clips overlying the cardiomediastinal silhouette and lower chest wall.  There is no new lung opacity allowing for differences in technique.  No large pleural effusion or pneumothorax.  Continued atherosclerotic aorta.  Clinical correlation and follow-up advised.    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 30 year smoking history. Current drinker with history of alcohol withdrawal      Overview/Hospital Course:  Anish was admitted. Started on covid treatment protocol. Began to experience sharp increase in anxiety tremulousness and blood presssure. Endorsed signficant alcohol use. Treated for alcohol withdrawal with Benzos. [facility]      Interval History:Patient placed on valium taper    Review of Systems   Constitutional: Positive for activity change and fever.   HENT: Positive for congestion.    Eyes: Negative for discharge and itching.   Respiratory: Positive for cough, shortness of breath and wheezing.    Cardiovascular: Negative for chest pain and palpitations.   Gastrointestinal: Negative for abdominal distention and abdominal pain.   Genitourinary: Negative for difficulty urinating and dysuria.   Musculoskeletal: Negative for arthralgias and back pain.   Skin: Negative for color change.   Neurological: Positive for tremors. Negative for dizziness and speech difficulty.   Psychiatric/Behavioral: Negative for agitation, behavioral problems, confusion and dysphoric mood. The patient is nervous/anxious and is hyperactive.      Objective:     Vital Signs (Most Recent):  Temp: 98.4 °F (36.9 °C) (01/10/22 0257)  Pulse: 80 (01/10/22 0257)  Resp: 19 (01/10/22 0257)  BP: (!) 155/69 (01/10/22 0257)  SpO2: (!) 91 % (01/10/22 0300) Vital Signs (24h Range):  Temp:  [97.8 °F (36.6 °C)-100.4 °F (38 °C)] 98.4 °F (36.9 °C)  Pulse:  [] 80  Resp:  [16-23] 19  SpO2:  [86 %-96 %] 91 %  BP: (150-187)/() 155/69     Weight: 88.5 kg (195 lb 1.7 oz)  Body mass index is 31.49 kg/m².    Intake/Output Summary (Last 24 hours) at 1/10/2022 1042  Last data filed at 1/9/2022 6401  Gross per  24 hour   Intake 286.95 ml   Output --   Net 286.95 ml      Physical Exam  Vitals and nursing note reviewed. Exam conducted with a chaperone present.   Constitutional:       Appearance: Normal appearance. She is obese. She is ill-appearing.   HENT:      Head: Normocephalic and atraumatic.      Right Ear: External ear normal.      Left Ear: External ear normal.      Nose: Congestion and rhinorrhea present.      Mouth/Throat:      Pharynx: Oropharynx is clear.   Eyes:      Conjunctiva/sclera: Conjunctivae normal.   Cardiovascular:      Rate and Rhythm: Regular rhythm. Tachycardia present.      Pulses: Normal pulses.      Heart sounds: Normal heart sounds.   Pulmonary:      Effort: Respiratory distress present.      Breath sounds: Wheezing present.   Chest:      Comments: Bilateral mastectomy scars with past reconstruction noted. The scars are not hypertrophic and well healed and all incisions are CDI with no signs of overlying erythema or infection   Abdominal:      General: Abdomen is flat. There is no distension.      Palpations: Abdomen is soft.      Tenderness: There is no abdominal tenderness.   Musculoskeletal:         General: No swelling, deformity or signs of injury.   Skin:     General: Skin is warm.      Findings: No erythema or rash.   Neurological:      General: No focal deficit present.      Mental Status: She is alert and oriented to person, place, and time. Mental status is at baseline.      Comments: Tremulous   Psychiatric:         Mood and Affect: Mood normal.         Behavior: Behavior normal.         Thought Content: Thought content normal.      Comments: States she feels like she is going to withdraw         Significant Labs: All pertinent labs within the past 24 hours have been reviewed.    Significant Imaging: I have reviewed all pertinent imaging results/findings within the past 24 hours.      Assessment/Plan:      * Acute hypoxemic respiratory failure  Patient with Hypoxic Respiratory failure  which is Acute on chronic.  she is on home oxygen at 3 LPM. At night Supplemental oxygen was provided and noted-  .   Signs/symptoms of respiratory failure include- increased work of breathing. Contributing diagnoses includes - COPD Labs and images were reviewed. Patient Has recent ABG, which has been reviewed. Will treat underlying causes and adjust management of respiratory failure as follows-     COVID +  No clear diagnosis of COPD  Patient wears 3L 02 but only at night, has had to wear during the day.  Wells criteria 1.5    - Will use 02 as needed for respiratory support  - Goal 02 88-92%  - Treat Covid Per protocol   - PFTs ordered- perform as outpatient  - Home spiriva ordered  - Albuterol every 6 hours      COVID-19  Patient tested positive for covid. Is at a higher 02 requirement then at baseline    -Remdesevir Dexamethasone  -Supportive care  -Daily CBC  -Home monitoring at discharge      Obesity (BMI 30.0-34.9)  Will provide weight loss counseling      Controlled type 2 diabetes mellitus without complication, without long-term current use of insulin  On admission glucose 91    - Monitor closely in the setting of dexamethasone  - On LDSS and 2 units of detemir- will adjust as needed    Hypothyroidism  Home dose levothyroxine 25 mg ordered      Anemia  Patient with hgb of 12.0     - Daily cbcs  - transfusion goal greater than 7      Chronic obstructive airway disease  See acute hypoxemic respiratory failure      Malignant neoplasm of central portion of right breast in female, estrogen receptor positive  Per previous oncology note    Mammogram on September 4, 2020 showed a focal asymmetry in the retroareolar region of the right breast.  Ultrasound at that time showed a 9 x 5 x 8 mm hypoechoic mass at 12:00 p.m..     Biopsy on September 29, 2020 showed grade 2 infiltrating ductal carcinoma (histologic grade 3, nuclear grade 2, mitotic index 2).  Tumor was 95% ER positive 20-30% VA positive and HER2 was 2+ but  negative by FISH.  Ki-67 was 80%.     On October 29, 2020 bilateral mastectomy and right axillary sentinel lymph node biopsy was performed.  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  Margins were negative with closest margin 6 mm.  The sentinel lymph node was negative.    Final pathological staging T1b N0 stage IA.     Letrozole started in February 2021.    No longer taking femara per patient         VINICIO (obstructive sleep apnea)    A PSG with Parasomnia montage was preformed on Earl Abdul on the night of 9/30/2020. The procedure was explained to the patient. All questions were asked and answered prior to the strat of the study. The patient did not meet split night criteria set by the doctor.     Little SDB events appeared to have been noted. Snoring was noted to be mild.     The EKG seemed to have shown NSR with PVC's. The lowest Spo2 noted was 84%.     Movement in arm leads noted during ZREM sleep. The patient stated she didnt remember any of her dreams. No talking or mumbling noted during the night.     Disposable equipment used where applicable. An end of the night instruction sheet was giving to the patient upon leaving the lab.    Ramírez's syndrome  See history of sarcoidosis      Hyperlipidemia  Consider statin initiation      History of sarcoidosis  She has hx of Sarcoid diagnosed about 41 years ago, she had erythema nodosum, arthralgias, mediastinal lymphadenopathy (no respiratory symptoms). She had mediastinoscopy with biopsy that revealed diagnosis of sarcoid. She was initially treated with prednisone for about 6 months and went into remission until 7 years ago she had recurrence of arthralgias and e nodosum and was treated by primary care doctor with steroids for a couple of months. Since this time she hasn't had any flares. No hx of uveitis or other sarcoid complications.     Per EMR last seen in 2015 by Rheumatology    - Consider Rheumatology consult  - No recent  flares         Depression with anxiety  Patient with significant past psychiatric history including SI SA polysubstance abuse and severe alcohol withdrawal. Will continue to monitor patient's affect closely while admitted  Per patient only taking trazadone and duloxetine      Trazadone 100mg nightly  Home dose of Duloxetine  Per pharm not taking pristique      Posterior reversible encephalopathy syndrome  History of PRES in 2017      Tobacco abuse  Will  on smoking cessation     - Provide nicotine patches as needed      Essential hypertension  Patient not on home blood pressure medications. Hypertensive to 156 while in the ED    - Consider initiation of antihypertensives while admitted      Alcohol withdrawal  See alcohol dependence      Alcohol use disorder, severe, dependence  Will monitor for signs of withdrawal. History of alcohol withdrawal in the past. Endorses daily drinking.    Tremulous and tachycardic on exam. States she feels like she is going to withdraw.     -Valium Taper per addiction psychiatry  -Follow ups as listed with addiction psychiatry  - Continue to monitor for signs of escalating withdrawal      VTE Risk Mitigation (From admission, onward)         Ordered     IP VTE HIGH RISK PATIENT  Once         01/08/22 1417     Place sequential compression device  Until discontinued         01/08/22 1417                Discharge Planning   BECCA:      Code Status: Full Code   Is the patient medically ready for discharge?:     Reason for patient still in hospital (select all that apply): Treatment                     Donny Lee MD  Department of Hospital Medicine   [facility] - Intensive Care ([place]

## 2022-01-10 NOTE — HOSPITAL COURSE
Anish was admitted. Started on covid treatment protocol. Began to experience sharp increase in anxiety tremulousness along with increased systolic BP to 190s. Endorsed signficant alcohol use. Treated for alcohol withdrawal with ativan PRN and Valium taper. [facility] - recommendation to continue trazodone and Cymbalta while inpatient and to extend Valium taper for an additional day due to protracted withdrawal symptoms. Patient progressing towards baseline however still experiencing tremors and anxiety.Patient with signicant improvement to clinical status on 1/13. Ammennable to returning home. Ambulatory and not on any oxygen. At this point the patient was medically cleared for follow up with the medications and follow up as listed below

## 2022-01-10 NOTE — PROGRESS NOTES
[facility] - Intensive Care ([place] [address])  Psychiatry  Progress Note    Patient Name: Earl Abdul  MRN: [id number]   Code Status: Full Code  Admission Date: 1/8/2022  Hospital Length of Stay: 2 days  Expected Discharge Date: 1/12/2022  Attending Physician: Christen Proctor MD  Primary Care Provider: Andrew Rodriguez MD    Current Legal Status: Uncontested    Patient information was obtained from patient.       Subjective:     Patient is a 61 y.o., female, presents with:    Principal Problem:Acute hypoxemic respiratory failure    Chief Complaint: as above    HPI:   1/9/2022 1:49 PM  Earl Abdul  1960  [id number]      ADDICTION CONSULT INITIAL EVALUATION     DEPARTMENT:  Psychiatry  SITE: [facility] [address]    DATE OF ADMISSION: 1/8/2022 11:08 AM  LENGTH OF STAY: 1 days    EXAMINING PRACTITIONER: Kari Eason    CONSULT REQUESTED BY: Christen Proctor MD      SUBJECTIVE     CHIEF COMPLAINT  Earl Abdul is a 61 y.o. female who is seen today for an initial psychiatric evaluation by the addiction psychiatry consult service for alcohol withdrawal  Earl Abdul presents with the chief complaint of: SOB and COVID+      HISTORY OF PRESENT ILLNESS    Per Primary MD:  61-year-old female with COPD (per patient), GERD, hyperlipidemia, hypertension, diabetes alcohol withdrawal and depression which presents the emergency room with worsening shortness of breath over the past 3 days.  Patient states that she went to Urgent Care and tested positive for COVID and due to her low oxygen level they advised her to come to the emergency room.  Patient states that she usually wears 3 L of O2 at night but not during the day.  The patient also endorses nausea and dry heaving. In the past 3 days she has had to use oxygen 24 hours a day.  Her  tested positive 3 days ago. On interview the patient endorses a significant recent history of daily alcohol consumption with last drink being  "approximately 24 hours ago     Per urgent care  The current episode started 1 to 4 weeks ago. Associated symptoms include headaches, myalgias, nasal congestion, postnasal drip and shortness of breath. Pertinent negatives include no chest pain, ear congestion, ear pain, fever, sore throat, sweats, weight loss or wheezing. She has tried nothing for the symptoms. The treatment provided no relief. Her past medical history is significant for COPD.      CXR No significant change from prior.  Continued surgical clips overlying the cardiomediastinal silhouette and lower chest wall.  There is no new lung opacity allowing for differences in technique.  No large pleural effusion or pneumothorax.  Continued atherosclerotic aorta.  Clinical correlation and follow-up advised.     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 30 year smoking history. Current drinker with history of alcohol withdrawal       Per Addiction Psych MD:  On initial interview, patient is lying in bed resting. She is calm but notably tremulous and somewhat drowsy. Agrees to interview and evaluation by psychiatry. She reports she came to hospital for SOB due to COVID. When discussing alcohol use, patient states she drinks apprx "a fifth a day"; attributes recent increase in alcohol 2/2 feeling "depressed, especially during the holidays". Reports she was in rehab apprx 6 months ago and was able to stay sober for 4 months after but subsequently relapsed. When discussed rehab in future, she declines and states "I can do it on my own this time". Denies any active SI or HI currently but does report intermittent SI thoughts in past when younger. Denies AVH. Patient lives at home with  who she cites as " ""very supportive".     Reports pain specialist doctor "Dr. Aguilera" prescribes her Pristiq 30mg which she takes daily. (Of note, on chart review, patient reports other psychiatric medications of Cymbalta and Trazadone). Previously seen in ABU.     On admission: BAL is 175, UDS positive for THC and Benzodiazepines (possibly from ED).   On Interview, patient's vitals are: 92% O2 saturation, 104 HR, and 145/64 BP.     COLLATERAL  None    SUBSTANCE ABUSE HISTORY  Substance(s) of Choice: Alcohol, THC   Substances Used: Alcohol  History of IVDU?: Denies  Use of Alcohol: Yes  Average Consumption: 1/5th liquor/day  Last Drink: Prior to ED Presentation (1 day ago)  Use of Medications for Alcohol/Opioid Use Disorder: Denies  History of Complicated Withdrawal: Yes, confirms hx of seizure  History of Detox: Yes   Rehab History: Yes apprx 6 months ago  AA/NA involvement: Yes per chart review  Tobacco: Reports 3 cigarettes a day and vaping  Spouse/Partner Consumption: Yes, reports some social consumption (states "outside the house") of alcohol by   Patient Aware of Biomedical Complications: Yes    DSM-5 SUBSTANCE USE DISORDER CRITERIA   Mild (1-3), Moderate (4-5), Severe (?6)  1. Often take in larger amounts or over a longer period of time than was intended.  2. Persistent desire or unsuccessful efforts to cut down or control use.  3. Great deal of time spent in activities necessary to obtain substance, use, or recover from effects.  4. Craving/strong desire for substance or urge to use.  5. Use resulting in failure to fulfill major role obligations at home, work or school.  6. Social, occupational, recreational activities decreased because of use.  7. Continued use despite having persistent or recurrent social or interpersonal problems cause or exaserbated by the substance.  8. Recurrent use in situations in which it is physically hazardous.  9. Use despite physical or psychological problems that are likely to have been " "caused or exacerbated by the substance.  10. Tolerance, as defined by either of the following.   A. A need for markedly increased amounts of substance to achieve intoxication or desired effect. -OR-    B. A markedly diminished effect with continued use of the same amount of substance.  11. Withdrawal, as manifested by the following.   A. The characteristic withdrawal syndrome for substance. -AND-   B. Substance is taken to relieve or avoid withdrawal symptoms.    ARE THE CRITERIA MET FOR DSM-5 SUBSTANCE USE DISORDER: Severe      PSYCHIATRIC HISTORY  Reported Diagnose(s): Depression, Anxiety  Previous Medication Trials: Pristiq, Cymbalta, Trazadone  Previous Psychiatric Hospitalizations: Denies  Outpatient Psychiatrist?: Denies; reports Pain specialist "Dr Aguilera" rx medications?; Per chart review, patient was previously seen in [facility] by Dr. Cortes       SUICIDE/HOMICIDE RISK ASSESSMENT  Current/active suicidal ideation/plan/intent: No  Previous suicide attempts: Yes, "many years ago"  Current/active homicidal ideation/plan/intent: No      HISTORY OF TRAUMA, ABUSE & VIOLENCE  Trauma: Unable to assess  Physical Abuse: Unable to assess  Sexual Abuse: Unable to Assess  Violent Conduct: Denies    Access to Gun: Denies       FAMILY PSYCHIATRIC HISTORY   Unable to Assess       SOCIAL HISTORY  Lives with   Is an Artist   Had 2 children (1 )  Cites tobacco use (3 cigarettes/day for 30+ years) and vaping (nicotine and cannabis)  Confirms daily EtOH Use (1/5th liquor/day)        PAST MEDICAL HISTORY   GERD, HLD, HTN, DM, EtOH Use Disorder and Withdrawal      PSYCHOSOCIAL FACTORS  Stressors (Psychosocial and Environmental): drug and alcohol.             Hospital Course: 1/10: Chart reviewed. Patient continues to require multiple doses of Ativan PRN for withdrawal symptoms in the last 24 hours, despite Valium taper. Today pt reports nausea, headache, tremor, and insomnia. She is somewhat irritable this morning. " "Specifically, patient states she takes 300 mg Trazodone at home and feels like the current dose here is not enough. She is still unwilling to seek rehab placement but expresses interest in [facility]. No other psychiatric complaints at this time. Denies SI/HI/AVH.        OBJECTIVE:     EXAMINATION    Vitals:    01/10/22 1245 01/10/22 1318 01/10/22 1406 01/10/22 1622   BP: (!) 167/77  (!) 194/90 (!) 162/73   BP Location: Left arm      Patient Position: Lying   Lying   Pulse: 100 99  87   Resp: 18 20     Temp:    98.1 °F (36.7 °C)   TempSrc:    Oral   SpO2: (!) 94% 98%  (!) 91%   Weight:       Height:           PAIN   1/10    CONSTITUTIONAL  General Appearance and Manner: Notably tremulous, elderly female lying in bed drowsy but in no acute distress     MUSCULOSKELETAL  Abnormal Involuntary Movements: Mild upper extremity tremor  Muscle Strength and Tone: Grossly equal bilaterally  Gait and Station: unable to assess, as patient is lying in bed     PSYCHIATRIC   Orientation: Alert and oriented x 3 to self, place, and year  Speech: Soft volume, monotone  Language: English  Mood: "Okay", irritable  Affect: blunted  Thought Process: Linear  Thought Content: Denies SI, HI, or AVH  Memory: Grossly intact  Attention and Concentration: Grossly intact  Fund of Knowledge: Consistent with level of education  Insight: Poor  Judgment: Fair      LABS/IMAGING/STUDIES   Recent Results (from the past 24 hour(s))   POCT glucose    Collection Time: 01/09/22  5:11 PM   Result Value Ref Range    POCT Glucose 171 (H) 70 - 110 mg/dL   POCT glucose    Collection Time: 01/09/22  9:05 PM   Result Value Ref Range    POCT Glucose 192 (H) 70 - 110 mg/dL   Comprehensive Metabolic Panel (CMP)    Collection Time: 01/10/22  4:00 AM   Result Value Ref Range    Sodium 137 136 - 145 mmol/L    Potassium 3.6 3.5 - 5.1 mmol/L    Chloride 100 95 - 110 mmol/L    CO2 26 23 - 29 mmol/L    Glucose 112 (H) 70 - 110 mg/dL    BUN 11 8 - 23 mg/dL    Creatinine " 0.8 0.5 - 1.4 mg/dL    Calcium 8.9 8.7 - 10.5 mg/dL    Total Protein 6.5 6.0 - 8.4 g/dL    Albumin 3.8 3.5 - 5.2 g/dL    Total Bilirubin 0.4 0.1 - 1.0 mg/dL    Alkaline Phosphatase 69 55 - 135 U/L    AST 44 (H) 10 - 40 U/L    ALT 54 (H) 10 - 44 U/L    Anion Gap 11 8 - 16 mmol/L    eGFR if African American >60.0 >60 mL/min/1.73 m^2    eGFR if non African American >60.0 >60 mL/min/1.73 m^2   Magnesium    Collection Time: 01/10/22  4:00 AM   Result Value Ref Range    Magnesium 1.7 1.6 - 2.6 mg/dL   Phosphorus    Collection Time: 01/10/22  4:00 AM   Result Value Ref Range    Phosphorus 2.6 (L) 2.7 - 4.5 mg/dL   CBC with Automated Differential    Collection Time: 01/10/22  4:00 AM   Result Value Ref Range    WBC 5.03 3.90 - 12.70 K/uL    RBC 4.56 4.00 - 5.40 M/uL    Hemoglobin 11.7 (L) 12.0 - 16.0 g/dL    Hematocrit 38.7 37.0 - 48.5 %    MCV 85 82 - 98 fL    MCH 25.7 (L) 27.0 - 31.0 pg    MCHC 30.2 (L) 32.0 - 36.0 g/dL    RDW 15.8 (H) 11.5 - 14.5 %    Platelets 112 (L) 150 - 450 K/uL    MPV 10.4 9.2 - 12.9 fL    Immature Granulocytes 0.4 0.0 - 0.5 %    Gran # (ANC) 2.1 1.8 - 7.7 K/uL    Immature Grans (Abs) 0.02 0.00 - 0.04 K/uL    Lymph # 2.4 1.0 - 4.8 K/uL    Mono # 0.5 0.3 - 1.0 K/uL    Eos # 0.0 0.0 - 0.5 K/uL    Baso # 0.01 0.00 - 0.20 K/uL    nRBC 0 0 /100 WBC    Gran % 42.0 38.0 - 73.0 %    Lymph % 46.7 18.0 - 48.0 %    Mono % 10.7 4.0 - 15.0 %    Eosinophil % 0.0 0.0 - 8.0 %    Basophil % 0.2 0.0 - 1.9 %    Differential Method Automated    POCT glucose    Collection Time: 01/10/22  7:41 AM   Result Value Ref Range    POCT Glucose 90 70 - 110 mg/dL   POCT glucose    Collection Time: 01/10/22 11:40 AM   Result Value Ref Range    POCT Glucose 206 (H) 70 - 110 mg/dL   POCT glucose    Collection Time: 01/10/22  4:23 PM   Result Value Ref Range    POCT Glucose 211 (H) 70 - 110 mg/dL      Imaging Results          X-Ray Chest AP Portable (Final result)  Result time 01/08/22 13:33:18    Final result by James Sotelo, DO  (01/08/22 13:33:18)                 Impression:      Please see above      Electronically signed by: James DO Deepak  Date:    01/08/2022  Time:    13:33             Narrative:    EXAMINATION:  XR CHEST AP PORTABLE    CLINICAL HISTORY:  COVID-19;    TECHNIQUE:  Single frontal view of the chest was performed.    COMPARISON:  01/08/2022    FINDINGS:  No significant change from prior.  Continued surgical clips overlying the cardiomediastinal silhouette and lower chest wall.  There is no new lung opacity allowing for differences in technique.  No large pleural effusion or pneumothorax.  Continued atherosclerotic aorta.  Clinical correlation and follow-up advised.                                     Scheduled Medications:   albuterol  2 puff Inhalation Q6H    ascorbic acid (vitamin C)  500 mg Oral BID    dexAMETHasone  6 mg Oral Daily    diazePAM  10 mg Oral Q12H    Followed by    [START ON 1/11/2022] diazePAM  5 mg Oral Q12H    Followed by    [START ON 1/12/2022] diazePAM  5 mg Oral QHS    DULoxetine  30 mg Oral Daily    enoxaparin  90 mg Subcutaneous Q12H    fluticasone furoate-vilanteroL  1 puff Inhalation Daily    folic acid  1 mg Oral Daily    insulin detemir U-100  2 Units Subcutaneous QHS    levothyroxine  25 mcg Oral Before breakfast    multivitamin  1 tablet Oral Daily    mupirocin   Nasal BID    pantoprazole  40 mg Oral Daily    remdesivir infusion  100 mg Intravenous Daily    traZODone  100 mg Oral QHS       PRN Medications:  sodium chloride 0.9%, acetaminophen, benzonatate, dextrose 50%, dextrose 50%, glucagon (human recombinant), glucose, glucose, hydrALAZINE, insulin aspart U-100, lorazepam, naloxone, sodium chloride 0.9%    Review of patient's allergies indicates:   Allergen Reactions    Lortab  [hydrocodone-acetaminophen] Itching    Promethazine Other (See Comments) and Itching     Other reaction(s): Itching       Assessment/Plan:     Alcohol use disorder, severe,  dependence    ASSESSMENT:     DIAGNOSES & PROBLEMS  Alcohol Use Disorder, current, severe  Cannabis Use Disorder   Depressive disorder, Unspecified       STRENGTHS AND LIABILITIES   Strength: Patient has positive support network., Liability: Patient is unstable., Liability: Patient lacks coping skills.      MOTIVATION TO PURSUE RECOVERY: low; not interested in rehab at this time    ACCEPTANCE OF ADDICTION: limited    ABILITY TO ADHERE TO TREATMENT PLAN: low      PLAN:       MANAGEMENT PLAN, TREATMENT GOALS, THERAPEUTIC TECHNIQUES/APPROACHES & CLINICAL REASONING    SCHEDULED MEDICATIONS:   - Continue Valium taper as previously documented but would increase dose to 10 mg q8 hours for an additional day, due to breakthrough withdrawal symptoms  · Day 1: 10mg Q8H  · Day 2: 10mg Q12H  · Day 3: 5mg Q12H  · Day 4: 5mg at bedtime    - Per home medications: Can continue home medications (Cymbalta 30mg daily and Trazadone 100mg nightly) but verify with /pharmacy if patient is taking these at home and if also taking reported Pristiq prescription; she has reported different medications per chart review to different providers and it would be best to verify which medications she has most recently been compliant with in order to better assess re-starting all/proper medications.         · Patient counseled on abstinence from alcohol and substances of abuse (illicit and prescription).  · Additional workup planned to address substance use disorder, in order to guide and refine ongoing management options, includes serial alcohol and drug laboratory testing (e.g. PETH, urine toxicology).  · Relapse prevention and motivational interviewing provided.  · Education provided on 12 step recovery programs.      PRESCRIPTION DRUG MANAGEMENT  - The risks and benefits of medication were discussed with this patient.  - Possible expectable adverse effects of any current or proposed individual psychotropic agents were discussed with this  patient.  - Counseling was provided on the importance of full compliance with medication regimens.      In cases of emergency, daily coverage provided by Acute/ER Psych MD, NP, or SW, with contact numbers located in [facility] On Call Schedule    Case discussed with staff addiction psychiatrist: Liborio Patel MD         Total time:  25 with greater than 50% of this time spent in counseling and/or coordination of care.       Júnior Gregorio MD   Psychiatry  [facility] - Intensive Care ([place])

## 2022-01-10 NOTE — ASSESSMENT & PLAN NOTE
ASSESSMENT:     DIAGNOSES & PROBLEMS  Alcohol Use Disorder, current, severe  Cannabis Use Disorder   Depressive disorder, Unspecified       STRENGTHS AND LIABILITIES   Strength: Patient has positive support network., Liability: Patient is unstable., Liability: Patient lacks coping skills.      MOTIVATION TO PURSUE RECOVERY: low; not interested in rehab at this time    ACCEPTANCE OF ADDICTION: limited    ABILITY TO ADHERE TO TREATMENT PLAN: low      PLAN:       MANAGEMENT PLAN, TREATMENT GOALS, THERAPEUTIC TECHNIQUES/APPROACHES & CLINICAL REASONING    SCHEDULED MEDICATIONS:   - Continue Valium taper as previously documented but would increase dose to 10 mg q8 hours for an additional day, due to breakthrough withdrawal symptoms  · Day 1: 10mg Q8H  · Day 2: 10mg Q12H  · Day 3: 5mg Q12H  · Day 4: 5mg at bedtime    - Per home medications: Can continue home medications (Cymbalta 30mg daily and Trazadone 100mg nightly) but verify with /pharmacy if patient is taking these at home and if also taking reported Pristiq prescription; she has reported different medications per chart review to different providers and it would be best to verify which medications she has most recently been compliant with in order to better assess re-starting all/proper medications.         · Patient counseled on abstinence from alcohol and substances of abuse (illicit and prescription).  · Additional workup planned to address substance use disorder, in order to guide and refine ongoing management options, includes serial alcohol and drug laboratory testing (e.g. PETH, urine toxicology).  · Relapse prevention and motivational interviewing provided.  · Education provided on 12 step recovery programs.      PRESCRIPTION DRUG MANAGEMENT  - The risks and benefits of medication were discussed with this patient.  - Possible expectable adverse effects of any current or proposed individual psychotropic agents were discussed with this patient.  -  Counseling was provided on the importance of full compliance with medication regimens.      In cases of emergency, daily coverage provided by Acute/ER Psych MD, NP, or SW, with contact numbers located in [facility] On Call Schedule    Case discussed with staff addiction psychiatrist: Liborio Patel MD

## 2022-01-10 NOTE — ASSESSMENT & PLAN NOTE
Patient with Hypoxic Respiratory failure which is Acute on chronic.  she is on home oxygen at 3 LPM. At night Supplemental oxygen was provided and noted-  .   Signs/symptoms of respiratory failure include- increased work of breathing. Contributing diagnoses includes - COPD Labs and images were reviewed. Patient Has recent ABG, which has been reviewed. Will treat underlying causes and adjust management of respiratory failure as follows-     COVID +  No clear diagnosis of COPD  Patient wears 3L 02 but only at night, has had to wear during the day.  Wells criteria 1.5    - Will use 02 as needed for respiratory support  - Goal 02 88-92%  - Treat Covid Per protocol   - PFTs ordered- perform as outpatient  - Home spiriva ordered  - Albuterol every 6 hours

## 2022-01-10 NOTE — SUBJECTIVE & OBJECTIVE
Interval History:Patient placed on valium taper    Review of Systems   Constitutional: Positive for activity change and fever.   HENT: Positive for congestion.    Eyes: Negative for discharge and itching.   Respiratory: Positive for cough, shortness of breath and wheezing.    Cardiovascular: Negative for chest pain and palpitations.   Gastrointestinal: Negative for abdominal distention and abdominal pain.   Genitourinary: Negative for difficulty urinating and dysuria.   Musculoskeletal: Negative for arthralgias and back pain.   Skin: Negative for color change.   Neurological: Positive for tremors. Negative for dizziness and speech difficulty.   Psychiatric/Behavioral: Negative for agitation, behavioral problems, confusion and dysphoric mood. The patient is nervous/anxious and is hyperactive.      Objective:     Vital Signs (Most Recent):  Temp: 98.4 °F (36.9 °C) (01/10/22 0257)  Pulse: 80 (01/10/22 0257)  Resp: 19 (01/10/22 0257)  BP: (!) 155/69 (01/10/22 0257)  SpO2: (!) 91 % (01/10/22 0300) Vital Signs (24h Range):  Temp:  [97.8 °F (36.6 °C)-100.4 °F (38 °C)] 98.4 °F (36.9 °C)  Pulse:  [] 80  Resp:  [16-23] 19  SpO2:  [86 %-96 %] 91 %  BP: (150-187)/() 155/69     Weight: 88.5 kg (195 lb 1.7 oz)  Body mass index is 31.49 kg/m².    Intake/Output Summary (Last 24 hours) at 1/10/2022 0633  Last data filed at 1/9/2022 1845  Gross per 24 hour   Intake 286.95 ml   Output --   Net 286.95 ml      Physical Exam  Vitals and nursing note reviewed. Exam conducted with a chaperone present.   Constitutional:       Appearance: Normal appearance. She is obese. She is ill-appearing.   HENT:      Head: Normocephalic and atraumatic.      Right Ear: External ear normal.      Left Ear: External ear normal.      Nose: Congestion and rhinorrhea present.      Mouth/Throat:      Pharynx: Oropharynx is clear.   Eyes:      Conjunctiva/sclera: Conjunctivae normal.   Cardiovascular:      Rate and Rhythm: Regular rhythm. Tachycardia  present.      Pulses: Normal pulses.      Heart sounds: Normal heart sounds.   Pulmonary:      Effort: Respiratory distress present.      Breath sounds: Wheezing present.   Chest:      Comments: Bilateral mastectomy scars with past reconstruction noted. The scars are not hypertrophic and well healed and all incisions are CDI with no signs of overlying erythema or infection   Abdominal:      General: Abdomen is flat. There is no distension.      Palpations: Abdomen is soft.      Tenderness: There is no abdominal tenderness.   Musculoskeletal:         General: No swelling, deformity or signs of injury.   Skin:     General: Skin is warm.      Findings: No erythema or rash.   Neurological:      General: No focal deficit present.      Mental Status: She is alert and oriented to person, place, and time. Mental status is at baseline.      Comments: Tremulous   Psychiatric:         Mood and Affect: Mood normal.         Behavior: Behavior normal.         Thought Content: Thought content normal.      Comments: States she feels like she is going to withdraw         Significant Labs: All pertinent labs within the past 24 hours have been reviewed.    Significant Imaging: I have reviewed all pertinent imaging results/findings within the past 24 hours.

## 2022-01-10 NOTE — PLAN OF CARE
"[facility] - Intensive Care ([facility]  Initial Discharge Assessment       Primary Care Provider: Andrew Rodriguez MD    Admission Diagnosis: Chest pain [R07.9]  COVID-19 [U07.1]    Admission Date: 1/8/2022  Expected Discharge Date:     Discharge Barriers Identified: Substance Abuse    Payor: [employer] / Plan:  BA[employer] RULE / Product Type: Commercial /     Extended Emergency Contact Information  Primary Emergency Contact: ChantellHenrique  Address: [address] [place] [address]            [place] [address] [facility]  Home Phone: [phone]  Relation: Spouse  Secondary Emergency Contact: Carlos Suazo  Mobile Phone: [phone]  Relation: Son    Discharge Plan A: Home  Discharge Plan B: [id number] [place] - [address] AT [facility]  [address]  [facility] [address]  Phone: [phone] Fax: [phone]    Initial Assessment (most recent)     Adult Discharge Assessment - 01/10/22 1345        Discharge Assessment    Assessment Type Discharge Planning Assessment     Confirmed/corrected address, phone number and insurance Yes     Confirmed Demographics Correct on Facesheet     Source of Information family     If unable to respond/provide information was family/caregiver contacted? Yes     Contact Name/Number  Henrique Abdul [phone]     When was your last doctors appointment? --   "Early November"    Communicated BECCA with patient/caregiver Yes     Reason For Admission Acute Hypoxemic respiratory failure     Lives With spouse     Facility Arrived From: Home     Do you expect to return to your current living situation? Yes     Do you have help at home or someone to help you manage your care at home? Yes     Who are your caregiver(s) and their phone number(s)?  Henrique Abdul [phone]     Prior to hospitilization cognitive status: Alert/Oriented     Current cognitive status: Alert/Oriented     Walking or " Climbing Stairs Difficulty none     Dressing/Bathing Difficulty none     Home Accessibility not wheelchair accessible;stairs within home     Number of Stairs, Within Home, Primary four     Stairs Comment, Within Home, Primary Pt not able to live on the first floor of the home     Equipment Currently Used at Home none     Readmission within 30 days? No     Patient currently being followed by outpatient case management? No     Do you currently have service(s) that help you manage your care at home? No     Do you have prescription coverage? Yes     Coverage [employer]     Do you have any problems affording any of your prescribed medications? No     Who is going to help you get home at discharge?  Henrique Abdul [phone]     How do you get to doctors appointments? car, drives self;family or friend will provide     Are you on dialysis? No     Do you take coumadin? No     Discharge Plan A Home     Discharge Plan B Home Health     DME Needed Upon Discharge  --   TBD    Discharge Plan discussed with: Spouse/sig other     Name(s) and Number(s)  Henrique Abdul [phone]     Discharge Barriers Identified Substance Abuse        Relationship/Environment    Name(s) of Who Lives With Patient  Henrique Abdul [phone]               Pt lives with her  in a two story home, she is unable to live on the first floor of the home. Pt was independent prior to hospitalization. Pt has a history of alcohol abuse and has been to rehab before. SW will assist with any discharge planning needs.     KHURRAM Laura, LMSW [facility]  [id number]

## 2022-01-10 NOTE — ASSESSMENT & PLAN NOTE
Per previous oncology note    Mammogram on September 4, 2020 showed a focal asymmetry in the retroareolar region of the right breast.  Ultrasound at that time showed a 9 x 5 x 8 mm hypoechoic mass at 12:00 p.m..     Biopsy on September 29, 2020 showed grade 2 infiltrating ductal carcinoma (histologic grade 3, nuclear grade 2, mitotic index 2).  Tumor was 95% ER positive 20-30% PA positive and HER2 was 2+ but negative by FISH.  Ki-67 was 80%.     On October 29, 2020 bilateral mastectomy and right axillary sentinel lymph node biopsy was performed.  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  Margins were negative with closest margin 6 mm.  The sentinel lymph node was negative.    Final pathological staging T1b N0 stage IA.     Letrozole started in February 2021.    No longer taking femara per patient

## 2022-01-10 NOTE — SUBJECTIVE & OBJECTIVE
"  OBJECTIVE:     EXAMINATION    Vitals:    01/10/22 1245 01/10/22 1318 01/10/22 1406 01/10/22 1622   BP: (!) 167/77  (!) 194/90 (!) 162/73   BP Location: Left arm      Patient Position: Lying   Lying   Pulse: 100 99  87   Resp: 18 20     Temp:    98.1 °F (36.7 °C)   TempSrc:    Oral   SpO2: (!) 94% 98%  (!) 91%   Weight:       Height:           PAIN   1/10    CONSTITUTIONAL  General Appearance and Manner: Notably tremulous, elderly female lying in bed drowsy but in no acute distress     MUSCULOSKELETAL  Abnormal Involuntary Movements: Mild upper extremity tremor  Muscle Strength and Tone: Grossly equal bilaterally  Gait and Station: unable to assess, as patient is lying in bed     PSYCHIATRIC   Orientation: Alert and oriented x 3 to self, place, and year  Speech: Soft volume, monotone  Language: English  Mood: "Okay", irritable  Affect: blunted  Thought Process: Linear  Thought Content: Denies SI, HI, or AVH  Memory: Grossly intact  Attention and Concentration: Grossly intact  Fund of Knowledge: Consistent with level of education  Insight: Poor  Judgment: Fair      LABS/IMAGING/STUDIES   Recent Results (from the past 24 hour(s))   POCT glucose    Collection Time: 01/09/22  5:11 PM   Result Value Ref Range    POCT Glucose 171 (H) 70 - 110 mg/dL   POCT glucose    Collection Time: 01/09/22  9:05 PM   Result Value Ref Range    POCT Glucose 192 (H) 70 - 110 mg/dL   Comprehensive Metabolic Panel (CMP)    Collection Time: 01/10/22  4:00 AM   Result Value Ref Range    Sodium 137 136 - 145 mmol/L    Potassium 3.6 3.5 - 5.1 mmol/L    Chloride 100 95 - 110 mmol/L    CO2 26 23 - 29 mmol/L    Glucose 112 (H) 70 - 110 mg/dL    BUN 11 8 - 23 mg/dL    Creatinine 0.8 0.5 - 1.4 mg/dL    Calcium 8.9 8.7 - 10.5 mg/dL    Total Protein 6.5 6.0 - 8.4 g/dL    Albumin 3.8 3.5 - 5.2 g/dL    Total Bilirubin 0.4 0.1 - 1.0 mg/dL    Alkaline Phosphatase 69 55 - 135 U/L    AST 44 (H) 10 - 40 U/L    ALT 54 (H) 10 - 44 U/L    Anion Gap 11 8 - 16 " mmol/L    eGFR if African American >60.0 >60 mL/min/1.73 m^2    eGFR if non African American >60.0 >60 mL/min/1.73 m^2   Magnesium    Collection Time: 01/10/22  4:00 AM   Result Value Ref Range    Magnesium 1.7 1.6 - 2.6 mg/dL   Phosphorus    Collection Time: 01/10/22  4:00 AM   Result Value Ref Range    Phosphorus 2.6 (L) 2.7 - 4.5 mg/dL   CBC with Automated Differential    Collection Time: 01/10/22  4:00 AM   Result Value Ref Range    WBC 5.03 3.90 - 12.70 K/uL    RBC 4.56 4.00 - 5.40 M/uL    Hemoglobin 11.7 (L) 12.0 - 16.0 g/dL    Hematocrit 38.7 37.0 - 48.5 %    MCV 85 82 - 98 fL    MCH 25.7 (L) 27.0 - 31.0 pg    MCHC 30.2 (L) 32.0 - 36.0 g/dL    RDW 15.8 (H) 11.5 - 14.5 %    Platelets 112 (L) 150 - 450 K/uL    MPV 10.4 9.2 - 12.9 fL    Immature Granulocytes 0.4 0.0 - 0.5 %    Gran # (ANC) 2.1 1.8 - 7.7 K/uL    Immature Grans (Abs) 0.02 0.00 - 0.04 K/uL    Lymph # 2.4 1.0 - 4.8 K/uL    Mono # 0.5 0.3 - 1.0 K/uL    Eos # 0.0 0.0 - 0.5 K/uL    Baso # 0.01 0.00 - 0.20 K/uL    nRBC 0 0 /100 WBC    Gran % 42.0 38.0 - 73.0 %    Lymph % 46.7 18.0 - 48.0 %    Mono % 10.7 4.0 - 15.0 %    Eosinophil % 0.0 0.0 - 8.0 %    Basophil % 0.2 0.0 - 1.9 %    Differential Method Automated    POCT glucose    Collection Time: 01/10/22  7:41 AM   Result Value Ref Range    POCT Glucose 90 70 - 110 mg/dL   POCT glucose    Collection Time: 01/10/22 11:40 AM   Result Value Ref Range    POCT Glucose 206 (H) 70 - 110 mg/dL   POCT glucose    Collection Time: 01/10/22  4:23 PM   Result Value Ref Range    POCT Glucose 211 (H) 70 - 110 mg/dL      Imaging Results          X-Ray Chest AP Portable (Final result)  Result time 01/08/22 13:33:18    Final result by James Sotelo DO (01/08/22 13:33:18)                 Impression:      Please see above      Electronically signed by: James Sotelo DO  Date:    01/08/2022  Time:    13:33             Narrative:    EXAMINATION:  XR CHEST AP PORTABLE    CLINICAL HISTORY:  COVID-19;    TECHNIQUE:  Single  frontal view of the chest was performed.    COMPARISON:  01/08/2022    FINDINGS:  No significant change from prior.  Continued surgical clips overlying the cardiomediastinal silhouette and lower chest wall.  There is no new lung opacity allowing for differences in technique.  No large pleural effusion or pneumothorax.  Continued atherosclerotic aorta.  Clinical correlation and follow-up advised.

## 2022-01-10 NOTE — ASSESSMENT & PLAN NOTE
Patient with significant past psychiatric history including SI SA polysubstance abuse and severe alcohol withdrawal. Will continue to monitor patient's affect closely while admitted  Per patient only taking trazadone and duloxetine      Trazadone 100mg nightly  Home dose of Duloxetine  Per pharm not taking pristique

## 2022-01-10 NOTE — HOSPITAL COURSE
1/10: Chart reviewed. Patient continues to require multiple doses of Ativan PRN for withdrawal symptoms in the last 24 hours, despite Valium taper. Today pt reports nausea, headache, tremor, and insomnia. She is somewhat irritable this morning. Specifically, patient states she takes 300 mg Trazodone at home and feels like the current dose here is not enough. She is still unwilling to seek rehab placement but expresses interest in [facility]. No other psychiatric complaints at this time. Denies SI/HI/AVH.    1/11: Chart reviewed. Patient still requiring multiple PRNs for breakthrough withdrawal. Valium taper increased to 10 mg q8 hours today. Patient reports she continues to have significant withdrawal symptoms, including tremors, irritability, and nausea. Tremors visible in upper extremities and are present even when patient is distracted. She denies headache or perceptual disturbances. She is tearful at the time of my evaluation, stating she is very sad about the conditions in her life right now, including her failure to maintain sobriety, the recent sudden death of her son from overdose, and her recent mastectomy, which has left her feeling disfigured. She requests an increase in her Cymbalta due to her depression. Patient denies SI/HI/AVH.

## 2022-01-10 NOTE — RESPIRATORY THERAPY
RAPID RESPONSE RESPIRATORY CHART CHECK       Chart check completed, instructed to call [phone] for further concerns or assistance.

## 2022-01-10 NOTE — PLAN OF CARE
Problem: Adult Inpatient Plan of Care  Goal: Plan of Care Review  Outcome: Ongoing, Progressing  Goal: Patient-Specific Goal (Individualized)  Outcome: Ongoing, Progressing  Goal: Absence of Hospital-Acquired Illness or Injury  Outcome: Ongoing, Progressing  Goal: Optimal Comfort and Wellbeing  Outcome: Ongoing, Progressing  Goal: Readiness for Transition of Care  Outcome: Ongoing, Progressing     Problem: Diabetes Comorbidity  Goal: Blood Glucose Level Within Targeted Range  Outcome: Ongoing, Progressing     Problem: Gas Exchange Impaired  Goal: Optimal Gas Exchange  Outcome: Ongoing, Progressing     Problem: Alcohol Withdrawal  Goal: Alcohol Withdrawal Symptom Control  Outcome: Ongoing, Progressing     Problem: Acute Neurologic Deterioration (Alcohol Withdrawal)  Goal: Optimal Neurologic Function  Outcome: Ongoing, Progressing     Problem: Substance Misuse (Alcohol Withdrawal)  Goal: Readiness for Change Identified  Outcome: Ongoing, Progressing

## 2022-01-11 LAB
ALBUMIN SERPL BCP-MCNC: 3.7 G/DL (ref 3.5–5.2)
ALP SERPL-CCNC: 59 U/L (ref 55–135)
ALT SERPL W/O P-5'-P-CCNC: 41 U/L (ref 10–44)
ANION GAP SERPL CALC-SCNC: 8 MMOL/L (ref 8–16)
AST SERPL-CCNC: 27 U/L (ref 10–40)
BASOPHILS # BLD AUTO: 0.03 K/UL (ref 0–0.2)
BASOPHILS NFR BLD: 0.4 % (ref 0–1.9)
BILIRUB SERPL-MCNC: 0.4 MG/DL (ref 0.1–1)
BUN SERPL-MCNC: 12 MG/DL (ref 8–23)
CALCIUM SERPL-MCNC: 8.9 MG/DL (ref 8.7–10.5)
CHLORIDE SERPL-SCNC: 99 MMOL/L (ref 95–110)
CO2 SERPL-SCNC: 27 MMOL/L (ref 23–29)
CREAT SERPL-MCNC: 0.8 MG/DL (ref 0.5–1.4)
DIFFERENTIAL METHOD: ABNORMAL
EOSINOPHIL # BLD AUTO: 0.1 K/UL (ref 0–0.5)
EOSINOPHIL NFR BLD: 1.3 % (ref 0–8)
ERYTHROCYTE [DISTWIDTH] IN BLOOD BY AUTOMATED COUNT: 15.9 % (ref 11.5–14.5)
EST. GFR  (AFRICAN AMERICAN): >60 ML/MIN/1.73 M^2
EST. GFR  (NON AFRICAN AMERICAN): >60 ML/MIN/1.73 M^2
GLUCOSE SERPL-MCNC: 166 MG/DL (ref 70–110)
HCT VFR BLD AUTO: 39.1 % (ref 37–48.5)
HGB BLD-MCNC: 11.9 G/DL (ref 12–16)
IMM GRANULOCYTES # BLD AUTO: 0.07 K/UL (ref 0–0.04)
IMM GRANULOCYTES NFR BLD AUTO: 1 % (ref 0–0.5)
LYMPHOCYTES # BLD AUTO: 3.8 K/UL (ref 1–4.8)
LYMPHOCYTES NFR BLD: 54.5 % (ref 18–48)
MAGNESIUM SERPL-MCNC: 1.7 MG/DL (ref 1.6–2.6)
MCH RBC QN AUTO: 26.2 PG (ref 27–31)
MCHC RBC AUTO-ENTMCNC: 30.4 G/DL (ref 32–36)
MCV RBC AUTO: 86 FL (ref 82–98)
MONOCYTES # BLD AUTO: 0.5 K/UL (ref 0.3–1)
MONOCYTES NFR BLD: 7.7 % (ref 4–15)
NEUTROPHILS # BLD AUTO: 2.4 K/UL (ref 1.8–7.7)
NEUTROPHILS NFR BLD: 35.1 % (ref 38–73)
NRBC BLD-RTO: 0 /100 WBC
PHOSPHATE SERPL-MCNC: 2.9 MG/DL (ref 2.7–4.5)
PLATELET # BLD AUTO: 122 K/UL (ref 150–450)
PMV BLD AUTO: 10.7 FL (ref 9.2–12.9)
POCT GLUCOSE: 140 MG/DL (ref 70–110)
POCT GLUCOSE: 187 MG/DL (ref 70–110)
POCT GLUCOSE: 270 MG/DL (ref 70–110)
POCT GLUCOSE: 85 MG/DL (ref 70–110)
POTASSIUM SERPL-SCNC: 3.5 MMOL/L (ref 3.5–5.1)
PROT SERPL-MCNC: 6.3 G/DL (ref 6–8.4)
RBC # BLD AUTO: 4.54 M/UL (ref 4–5.4)
SODIUM SERPL-SCNC: 134 MMOL/L (ref 136–145)
WBC # BLD AUTO: 6.97 K/UL (ref 3.9–12.7)

## 2022-01-11 PROCEDURE — 94640 AIRWAY INHALATION TREATMENT: CPT

## 2022-01-11 PROCEDURE — 99232 PR SUBSEQUENT HOSPITAL CARE,LEVL II: ICD-10-PCS | Mod: ,,, | Performed by: PSYCHIATRY & NEUROLOGY

## 2022-01-11 PROCEDURE — 27000221 HC OXYGEN, UP TO 24 HOURS

## 2022-01-11 PROCEDURE — 85025 COMPLETE CBC W/AUTO DIFF WBC: CPT | Performed by: STUDENT IN AN ORGANIZED HEALTH CARE EDUCATION/TRAINING PROGRAM

## 2022-01-11 PROCEDURE — 83735 ASSAY OF MAGNESIUM: CPT | Performed by: STUDENT IN AN ORGANIZED HEALTH CARE EDUCATION/TRAINING PROGRAM

## 2022-01-11 PROCEDURE — 99233 PR SUBSEQUENT HOSPITAL CARE,LEVL III: ICD-10-PCS | Mod: CR,,, | Performed by: INTERNAL MEDICINE

## 2022-01-11 PROCEDURE — 27000207 HC ISOLATION

## 2022-01-11 PROCEDURE — 80053 COMPREHEN METABOLIC PANEL: CPT | Performed by: STUDENT IN AN ORGANIZED HEALTH CARE EDUCATION/TRAINING PROGRAM

## 2022-01-11 PROCEDURE — 99900035 HC TECH TIME PER 15 MIN (STAT)

## 2022-01-11 PROCEDURE — 25000003 PHARM REV CODE 250: Performed by: STUDENT IN AN ORGANIZED HEALTH CARE EDUCATION/TRAINING PROGRAM

## 2022-01-11 PROCEDURE — 99232 SBSQ HOSP IP/OBS MODERATE 35: CPT | Mod: ,,, | Performed by: PSYCHIATRY & NEUROLOGY

## 2022-01-11 PROCEDURE — 99233 SBSQ HOSP IP/OBS HIGH 50: CPT | Mod: CR,,, | Performed by: INTERNAL MEDICINE

## 2022-01-11 PROCEDURE — 36415 COLL VENOUS BLD VENIPUNCTURE: CPT | Performed by: STUDENT IN AN ORGANIZED HEALTH CARE EDUCATION/TRAINING PROGRAM

## 2022-01-11 PROCEDURE — 63600175 PHARM REV CODE 636 W HCPCS

## 2022-01-11 PROCEDURE — 63600175 PHARM REV CODE 636 W HCPCS: Performed by: STUDENT IN AN ORGANIZED HEALTH CARE EDUCATION/TRAINING PROGRAM

## 2022-01-11 PROCEDURE — 84100 ASSAY OF PHOSPHORUS: CPT | Performed by: STUDENT IN AN ORGANIZED HEALTH CARE EDUCATION/TRAINING PROGRAM

## 2022-01-11 PROCEDURE — 12000002 HC ACUTE/MED SURGE SEMI-PRIVATE ROOM

## 2022-01-11 PROCEDURE — 94761 N-INVAS EAR/PLS OXIMETRY MLT: CPT

## 2022-01-11 PROCEDURE — 25000003 PHARM REV CODE 250: Performed by: INTERNAL MEDICINE

## 2022-01-11 RX ORDER — MAGNESIUM SULFATE HEPTAHYDRATE 40 MG/ML
2 INJECTION, SOLUTION INTRAVENOUS ONCE
Status: COMPLETED | OUTPATIENT
Start: 2022-01-11 | End: 2022-01-11

## 2022-01-11 RX ORDER — DIAZEPAM 5 MG/1
10 TABLET ORAL EVERY 12 HOURS
Status: DISCONTINUED | OUTPATIENT
Start: 2022-01-12 | End: 2022-01-12

## 2022-01-11 RX ORDER — DIAZEPAM 5 MG/1
10 TABLET ORAL EVERY 8 HOURS
Status: COMPLETED | OUTPATIENT
Start: 2022-01-11 | End: 2022-01-11

## 2022-01-11 RX ADMIN — INSULIN DETEMIR 2 UNITS: 100 INJECTION, SOLUTION SUBCUTANEOUS at 09:01

## 2022-01-11 RX ADMIN — FOLIC ACID 1 MG: 1 TABLET ORAL at 10:01

## 2022-01-11 RX ADMIN — ALBUTEROL SULFATE 2 PUFF: 108 INHALANT RESPIRATORY (INHALATION) at 07:01

## 2022-01-11 RX ADMIN — MULTIPLE VITAMINS W/ MINERALS TAB 1 TABLET: TAB at 10:01

## 2022-01-11 RX ADMIN — DIAZEPAM 10 MG: 5 TABLET ORAL at 09:01

## 2022-01-11 RX ADMIN — TRAZODONE HYDROCHLORIDE 100 MG: 100 TABLET ORAL at 09:01

## 2022-01-11 RX ADMIN — DEXAMETHASONE 6 MG: 4 TABLET ORAL at 10:01

## 2022-01-11 RX ADMIN — DIAZEPAM 10 MG: 5 TABLET ORAL at 10:01

## 2022-01-11 RX ADMIN — MUPIROCIN: 20 OINTMENT TOPICAL at 02:01

## 2022-01-11 RX ADMIN — ALBUTEROL SULFATE 2 PUFF: 108 INHALANT RESPIRATORY (INHALATION) at 01:01

## 2022-01-11 RX ADMIN — FLUTICASONE FUROATE AND VILANTEROL TRIFENATATE 1 PUFF: 100; 25 POWDER RESPIRATORY (INHALATION) at 08:01

## 2022-01-11 RX ADMIN — OXYCODONE HYDROCHLORIDE AND ACETAMINOPHEN 500 MG: 500 TABLET ORAL at 09:01

## 2022-01-11 RX ADMIN — LEVOTHYROXINE SODIUM 25 MCG: 0.03 TABLET ORAL at 05:01

## 2022-01-11 RX ADMIN — OXYCODONE HYDROCHLORIDE AND ACETAMINOPHEN 500 MG: 500 TABLET ORAL at 10:01

## 2022-01-11 RX ADMIN — PANTOPRAZOLE SODIUM 40 MG: 40 TABLET, DELAYED RELEASE ORAL at 10:01

## 2022-01-11 RX ADMIN — ENOXAPARIN SODIUM 90 MG: 100 INJECTION, SOLUTION INTRAVENOUS; SUBCUTANEOUS at 10:01

## 2022-01-11 RX ADMIN — LORAZEPAM 2 MG: 2 INJECTION INTRAMUSCULAR; INTRAVENOUS at 10:01

## 2022-01-11 RX ADMIN — DULOXETINE 30 MG: 30 CAPSULE, DELAYED RELEASE ORAL at 10:01

## 2022-01-11 RX ADMIN — ALBUTEROL SULFATE 2 PUFF: 108 INHALANT RESPIRATORY (INHALATION) at 12:01

## 2022-01-11 RX ADMIN — REMDESIVIR 100 MG: 100 INJECTION, POWDER, LYOPHILIZED, FOR SOLUTION INTRAVENOUS at 10:01

## 2022-01-11 RX ADMIN — LORAZEPAM 2 MG: 2 INJECTION INTRAMUSCULAR; INTRAVENOUS at 02:01

## 2022-01-11 RX ADMIN — ALBUTEROL SULFATE 2 PUFF: 108 INHALANT RESPIRATORY (INHALATION) at 08:01

## 2022-01-11 RX ADMIN — ENOXAPARIN SODIUM 90 MG: 100 INJECTION, SOLUTION INTRAVENOUS; SUBCUTANEOUS at 09:01

## 2022-01-11 RX ADMIN — DIAZEPAM 10 MG: 5 TABLET ORAL at 02:01

## 2022-01-11 RX ADMIN — MAGNESIUM SULFATE 2 G: 2 INJECTION INTRAVENOUS at 12:01

## 2022-01-11 NOTE — PLAN OF CARE
Problem: Adult Inpatient Plan of Care  Goal: Plan of Care Review  Outcome: Ongoing, Progressing  Flowsheets (Taken 1/10/2022 1837)  Plan of Care Reviewed With: patient  Goal: Patient-Specific Goal (Individualized)  Outcome: Ongoing, Progressing  Goal: Absence of Hospital-Acquired Illness or Injury  Outcome: Ongoing, Progressing  Intervention: Identify and Manage Fall Risk  Flowsheets (Taken 1/10/2022 1837)  Safety Promotion/Fall Prevention:   assistive device/personal item within reach   Fall Risk reviewed with patient/family   medications reviewed   side rails raised x 2   instructed to call staff for mobility  Intervention: Prevent Skin Injury  Flowsheets (Taken 1/10/2022 1837)  Skin Protection: incontinence pads utilized  Intervention: Prevent and Manage VTE (Venous Thromboembolism) Risk  Flowsheets (Taken 1/10/2022 1837)  VTE Prevention/Management: bleeding risk assessed  Intervention: Prevent Infection  Flowsheets (Taken 1/10/2022 1837)  Infection Prevention:   personal protective equipment utilized   single patient room provided  Goal: Optimal Comfort and Wellbeing  Outcome: Ongoing, Progressing  Intervention: Monitor Pain and Promote Comfort  Flowsheets (Taken 1/10/2022 1837)  Pain Management Interventions: care clustered  Intervention: Provide Person-Centered Care  Flowsheets (Taken 1/10/2022 1837)  Trust Relationship/Rapport:   care explained   choices provided   thoughts/feelings acknowledged    Patient alert and oriented x4; care plan discussed with patient; free from falls/injuries during the shift; call light and personal belongings within reach; blood sugar monitored throughout shift; blood pressure monitored, prn hydralazine given x1 dose; will continue with poc.

## 2022-01-11 NOTE — SUBJECTIVE & OBJECTIVE
"Interval History: Pt reports poor sleep due to anxiety and agitation. SBP to 190s and tachycardic to 108. Sats adequate on room air. States she is feeling anxious but tremors are moderately improved. Eager to go home and asked if she can go home with benzos. Requested increase in Cymbalta since she "is just not happy".    Review of Systems   Constitutional: Positive for activity change. Negative for chills, diaphoresis and fever.   HENT: Negative for congestion and sore throat.    Eyes: Negative for discharge and itching.   Respiratory: Negative for cough, shortness of breath and wheezing.    Cardiovascular: Negative for chest pain and palpitations.   Gastrointestinal: Negative for abdominal distention, abdominal pain, nausea and vomiting.   Genitourinary: Negative for difficulty urinating and dysuria.   Musculoskeletal: Negative for arthralgias, back pain and myalgias.   Skin: Negative for color change.   Neurological: Positive for tremors. Negative for dizziness, seizures and headaches.   Psychiatric/Behavioral: Positive for agitation and sleep disturbance. Negative for behavioral problems, confusion and hallucinations. The patient is nervous/anxious and is hyperactive.      Objective:     Vital Signs (Most Recent):  Temp: 98.3 °F (36.8 °C) (01/11/22 1131)  Pulse: 101 (01/11/22 1504)  Resp: 18 (01/11/22 1315)  BP: (!) 148/66 (01/11/22 1131)  SpO2: 97 % (01/11/22 1315) Vital Signs (24h Range):  Temp:  [98 °F (36.7 °C)-98.5 °F (36.9 °C)] 98.3 °F (36.8 °C)  Pulse:  [] 101  Resp:  [18-22] 18  SpO2:  [90 %-98 %] 97 %  BP: (126-193)/(66-91) 148/66     Weight: 88.5 kg (195 lb 1.7 oz)  Body mass index is 31.49 kg/m².    Intake/Output Summary (Last 24 hours) at 1/11/2022 1515  Last data filed at 1/11/2022 0800  Gross per 24 hour   Intake 429.89 ml   Output --   Net 429.89 ml      Physical Exam  Vitals and nursing note reviewed.   Constitutional:       Appearance: She is obese. She is ill-appearing. She is not " diaphoretic.   HENT:      Head: Normocephalic and atraumatic.      Right Ear: External ear normal.      Left Ear: External ear normal.      Nose: No congestion or rhinorrhea.      Mouth/Throat:      Mouth: Mucous membranes are moist.      Pharynx: Oropharynx is clear.   Eyes:      General: No scleral icterus.     Extraocular Movements: Extraocular movements intact.      Conjunctiva/sclera: Conjunctivae normal.   Cardiovascular:      Rate and Rhythm: Regular rhythm. Tachycardia present.      Pulses: Normal pulses.      Heart sounds: Normal heart sounds. No murmur heard.      Pulmonary:      Effort: Pulmonary effort is normal. No respiratory distress.      Breath sounds: Normal breath sounds. No wheezing, rhonchi or rales.   Chest:      Comments: Bilateral mastectomy scars with past reconstruction noted. The scars are not hypertrophic and well healed and all incisions are CDI with no signs of overlying erythema or infection   Abdominal:      General: Abdomen is flat. Bowel sounds are normal. There is no distension.      Palpations: Abdomen is soft.      Tenderness: There is no abdominal tenderness.   Musculoskeletal:         General: No swelling, deformity or signs of injury.   Skin:     General: Skin is warm.      Findings: No erythema or rash.   Neurological:      General: No focal deficit present.      Mental Status: She is alert and oriented to person, place, and time. Mental status is at baseline.      Comments: Tremulous   Psychiatric:         Mood and Affect: Mood normal.         Behavior: Behavior normal.         Thought Content: Thought content normal.      Comments: States she feels agitated with sensitivity to sounds.         Significant Labs:   All pertinent labs within the past 24 hours have been reviewed.  CBC:   Recent Labs   Lab 01/10/22  0400 01/11/22  0454   WBC 5.03 6.97   HGB 11.7* 11.9*   HCT 38.7 39.1   * 122*     CMP:   Recent Labs   Lab 01/10/22  0400 01/11/22  0454    134*   K 3.6  3.5    99   CO2 26 27   * 166*   BUN 11 12   CREATININE 0.8 0.8   CALCIUM 8.9 8.9   PROT 6.5 6.3   ALBUMIN 3.8 3.7   BILITOT 0.4 0.4   ALKPHOS 69 59   AST 44* 27   ALT 54* 41   ANIONGAP 11 8   EGFRNONAA >60.0 >60.0       Significant Imaging: I have reviewed all pertinent imaging results/findings within the past 24 hours.

## 2022-01-11 NOTE — ASSESSMENT & PLAN NOTE
Patient tested positive for covid. Is at a higher 02 requirement then at baseline    Improved    -Remdesevir Dexamethasone  -Supportive care  -Daily CBC  -Home monitoring at discharge

## 2022-01-11 NOTE — PROGRESS NOTES
[facility] - [facility] ([address] Medicine  Progress Note    Patient Name: Earl Abdul  MRN: [id number]  Patient Class: IP- Inpatient   Admission Date: 1/8/2022  Length of Stay: 3 days  Attending Physician: Christen Proctor MD  Primary Care Provider: Andrew Rodriguez MD        Subjective:     Principal Problem:Acute hypoxemic respiratory failure        HPI:      61-year-old female with COPD (per patient), GERD, hyperlipidemia, hypertension, diabetes alcohol withdrawal and depression which presents the emergency room with worsening shortness of breath over the past 3 days.  Patient states that she went to Urgent Care and tested positive for COVID and due to her low oxygen level they advised her to come to the emergency room.  Patient states that she usually wears 3 L of O2 at night but not during the day.  The patient also endorses nausea and dry heaving. In the past 3 days she has had to use oxygen 24 hours a day.  Her  tested positive 3 days ago. On interview the patient endorses a significant recent history of daily alcohol consumption with last drink being approximately 24 hours ago    Per urgent care  The current episode started 1 to 4 weeks ago. Associated symptoms include headaches, myalgias, nasal congestion, postnasal drip and shortness of breath. Pertinent negatives include no chest pain, ear congestion, ear pain, fever, sore throat, sweats, weight loss or wheezing. She has tried nothing for the symptoms. The treatment provided no relief. Her past medical history is significant for COPD.     CXR No significant change from prior.  Continued surgical clips overlying the cardiomediastinal silhouette and lower chest wall.  There is no new lung opacity allowing for differences in technique.  No large pleural effusion or pneumothorax.  Continued atherosclerotic aorta.  Clinical correlation and follow-up advised.    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 30 year smoking history. Current drinker with history of alcohol withdrawal      Overview/Hospital Course:  Anish was admitted. Started on covid treatment protocol. Began to experience sharp increase in anxiety tremulousness along with increased systolic BP to 190s. Endorsed signficant alcohol use. Treated for alcohol withdrawal with ativan PRN and Valium taper. Addiction [facility] consulted - recommendation to continue trazodone and Cymbalta while inpatient and to extend Valium taper for an additional day due to protracted withdrawal symptoms. Patient progressing towards baseline however still experiencing tremors and anxiety.      Interval History: Pt reports poor sleep due to anxiety and agitation. SBP to 190s and tachycardic to 108. Sats adequate on room air. States she is feeling anxious but tremors are moderately improved. Eager to go home and asked if she can go home with benzos. Requested increase in Cymbalta since she "is just not happy".    Review of Systems   Constitutional: Positive for activity change. Negative for chills, diaphoresis and fever.   HENT: Negative for congestion and sore throat.    Eyes: Negative for discharge and itching.   Respiratory: Negative for cough, shortness of breath and wheezing.    Cardiovascular: Negative for chest pain and palpitations.   Gastrointestinal: Negative for abdominal distention, abdominal pain, nausea and vomiting.   Genitourinary: Negative for difficulty urinating and dysuria.   Musculoskeletal: Negative for arthralgias, back pain and myalgias.   Skin: Negative for color change.   Neurological: Positive for tremors. Negative for dizziness, seizures and headaches.   Psychiatric/Behavioral: Positive for agitation and sleep disturbance. " Negative for behavioral problems, confusion and hallucinations. The patient is nervous/anxious and is hyperactive.      Objective:     Vital Signs (Most Recent):  Temp: 98.3 °F (36.8 °C) (01/11/22 1131)  Pulse: 101 (01/11/22 1504)  Resp: 18 (01/11/22 1315)  BP: (!) 148/66 (01/11/22 1131)  SpO2: 97 % (01/11/22 1315) Vital Signs (24h Range):  Temp:  [98 °F (36.7 °C)-98.5 °F (36.9 °C)] 98.3 °F (36.8 °C)  Pulse:  [] 101  Resp:  [18-22] 18  SpO2:  [90 %-98 %] 97 %  BP: (126-193)/(66-91) 148/66     Weight: 88.5 kg (195 lb 1.7 oz)  Body mass index is 31.49 kg/m².    Intake/Output Summary (Last 24 hours) at 1/11/2022 1515  Last data filed at 1/11/2022 0800  Gross per 24 hour   Intake 429.89 ml   Output --   Net 429.89 ml      Physical Exam  Vitals and nursing note reviewed.   Constitutional:       Appearance: She is obese. She is ill-appearing. She is not diaphoretic.   HENT:      Head: Normocephalic and atraumatic.      Right Ear: External ear normal.      Left Ear: External ear normal.      Nose: No congestion or rhinorrhea.      Mouth/Throat:      Mouth: Mucous membranes are moist.      Pharynx: Oropharynx is clear.   Eyes:      General: No scleral icterus.     Extraocular Movements: Extraocular movements intact.      Conjunctiva/sclera: Conjunctivae normal.   Cardiovascular:      Rate and Rhythm: Regular rhythm. Tachycardia present.      Pulses: Normal pulses.      Heart sounds: Normal heart sounds. No murmur heard.      Pulmonary:      Effort: Pulmonary effort is normal. No respiratory distress.      Breath sounds: Normal breath sounds. No wheezing, rhonchi or rales.   Chest:      Comments: Bilateral mastectomy scars with past reconstruction noted. The scars are not hypertrophic and well healed and all incisions are CDI with no signs of overlying erythema or infection   Abdominal:      General: Abdomen is flat. Bowel sounds are normal. There is no distension.      Palpations: Abdomen is soft.      Tenderness:  There is no abdominal tenderness.   Musculoskeletal:         General: No swelling, deformity or signs of injury.   Skin:     General: Skin is warm.      Findings: No erythema or rash.   Neurological:      General: No focal deficit present.      Mental Status: She is alert and oriented to person, place, and time. Mental status is at baseline.      Comments: Tremulous   Psychiatric:         Mood and Affect: Mood normal.         Behavior: Behavior normal.         Thought Content: Thought content normal.      Comments: States she feels agitated with sensitivity to sounds.         Significant Labs:   All pertinent labs within the past 24 hours have been reviewed.  CBC:   Recent Labs   Lab 01/10/22  0400 01/11/22 0454   WBC 5.03 6.97   HGB 11.7* 11.9*   HCT 38.7 39.1   * 122*     CMP:   Recent Labs   Lab 01/10/22  0400 01/11/22  0454    134*   K 3.6 3.5    99   CO2 26 27   * 166*   BUN 11 12   CREATININE 0.8 0.8   CALCIUM 8.9 8.9   PROT 6.5 6.3   ALBUMIN 3.8 3.7   BILITOT 0.4 0.4   ALKPHOS 69 59   AST 44* 27   ALT 54* 41   ANIONGAP 11 8   EGFRNONAA >60.0 >60.0       Significant Imaging: I have reviewed all pertinent imaging results/findings within the past 24 hours.      Assessment/Plan:      * Acute hypoxemic respiratory failure  Patient with Hypoxic Respiratory failure which is Acute on chronic.  she is on home oxygen at 3 LPM. At night Supplemental oxygen was provided and noted-  .   Signs/symptoms of respiratory failure include- increased work of breathing. Contributing diagnoses includes - COPD Labs and images were reviewed. Patient Has recent ABG, which has been reviewed. Will treat underlying causes and adjust management of respiratory failure as follows-     Improved    COVID +  No clear diagnosis of COPD  Patient wears 3L 02 but only at night, has had to wear during the day.  Wells criteria 1.5    - Will use 02 as needed for respiratory support  - Goal 02 88-92%  - Treat Covid Per  protocol   - PFTs ordered- perform as outpatient  - Home spiriva ordered  - Albuterol every 6 hours      COVID-19  Patient tested positive for covid. Is at a higher 02 requirement then at baseline    Improved    -Remdesevir Dexamethasone  -Supportive care  -Daily CBC  -Home monitoring at discharge      Obesity (BMI 30.0-34.9)  Will provide weight loss counseling      Controlled type 2 diabetes mellitus without complication, without long-term current use of insulin  On admission glucose 91    - Monitor closely in the setting of dexamethasone  - On LDSS and 2 units of detemir- will adjust as needed    Hypothyroidism  Home dose levothyroxine 25 mg ordered      Anemia  Patient with hgb of 12.0     - Daily cbcs  - transfusion goal greater than 7      Chronic obstructive airway disease  See acute hypoxemic respiratory failure      Malignant neoplasm of central portion of right breast in female, estrogen receptor positive  Per previous oncology note    Mammogram on September 4, 2020 showed a focal asymmetry in the retroareolar region of the right breast.  Ultrasound at that time showed a 9 x 5 x 8 mm hypoechoic mass at 12:00 p.m..     Biopsy on September 29, 2020 showed grade 2 infiltrating ductal carcinoma (histologic grade 3, nuclear grade 2, mitotic index 2).  Tumor was 95% ER positive 20-30% MD positive and HER2 was 2+ but negative by FISH.  Ki-67 was 80%.     On October 29, 2020 bilateral mastectomy and right axillary sentinel lymph node biopsy was performed.  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  Margins were negative with closest margin 6 mm.  The sentinel lymph node was negative.    Final pathological staging T1b N0 stage IA.     Letrozole started in February 2021.    No longer taking femara per patient         VINICIO (obstructive sleep apnea)    A [facility] with [facility]. The procedure was explained to the patient. All questions  were asked and answered prior to the strat of the study. The patient did not meet split night criteria set by the doctor.     Little SDB events appeared to have been noted. Snoring was noted to be mild.     The EKG seemed to have shown NSR with PVC's. The lowest Spo2 noted was 84%.     Movement in arm leads noted during ZREM sleep. The patient stated she didnt remember any of her dreams. No talking or mumbling noted during the night.     Disposable equipment used where applicable. An end of the night instruction sheet was giving to the patient upon leaving the lab.    Ramírez's syndrome  See history of sarcoidosis      Hyperlipidemia  Consider statin initiation      History of sarcoidosis  She has hx of Sarcoid diagnosed about 41 years ago, she had erythema nodosum, arthralgias, mediastinal lymphadenopathy (no respiratory symptoms). She had mediastinoscopy with biopsy that revealed diagnosis of sarcoid. She was initially treated with prednisone for about 6 months and went into remission until 7 years ago she had recurrence of arthralgias and e nodosum and was treated by primary care doctor with steroids for a couple of months. Since this time she hasn't had any flares. No hx of uveitis or other sarcoid complications.     Per EMR last seen in 2015 by Rheumatology    - Consider Rheumatology consult  - No recent flares         Depression with anxiety  Patient with significant past psychiatric history including SI SA polysubstance abuse and severe alcohol withdrawal. Will continue to monitor patient's affect closely while admitted  Per patient only taking trazadone and duloxetine      Trazadone 100mg nightly  Home dose of Duloxetine  Per pharm not taking pristique      Posterior reversible encephalopathy syndrome  History of PRES in 2017      Tobacco abuse  Will  on smoking cessation     - Provide nicotine patches as needed      Essential hypertension  Patient not on home blood pressure medications. Hypertensive  to 156 while in the ED likely 2/2 alcohol withdrawal.    - Consider initiation of antihypertensives while admitted      Alcohol withdrawal  See alcohol dependence      Alcohol use disorder, severe, dependence  Will monitor for signs of withdrawal. History of alcohol withdrawal in the past. Endorses daily drinking.    Tremulous and tachycardic on exam.  Improved, will extend valium taper additional day per addiction psych    - Valium Taper per addiction psychiatry  - Follow ups as listed with addiction psychiatry  - Continue to monitor for signs of escalating withdrawal      VTE Risk Mitigation (From admission, onward)         Ordered     enoxaparin injection 90 mg  Every 12 hours         01/10/22 1430     IP VTE HIGH RISK PATIENT  Once         01/08/22 1417     Place sequential compression device  Until discontinued         01/08/22 1417                Discharge Planning   BECCA: 1/12/2022     Code Status: Full Code   Is the patient medically ready for discharge?:     Reason for patient still in hospital (select all that apply): Patient trending condition, Laboratory test, Treatment, Consult recommendations and Pending disposition  Discharge Plan A: Home                  Sg Yeboah MD  Department of Hospital Medicine   [facility] - Intensive Care ([place])

## 2022-01-11 NOTE — ASSESSMENT & PLAN NOTE
Will monitor for signs of withdrawal. History of alcohol withdrawal in the past. Endorses daily drinking.    Tremulous and tachycardic on exam.  Improved, will extend valium taper additional day per addiction psych    - Valium Taper per addiction psychiatry  - Follow ups as listed with addiction psychiatry  - Continue to monitor for signs of escalating withdrawal

## 2022-01-11 NOTE — ASSESSMENT & PLAN NOTE
Patient with Hypoxic Respiratory failure which is Acute on chronic.  she is on home oxygen at 3 LPM. At night Supplemental oxygen was provided and noted-  .   Signs/symptoms of respiratory failure include- increased work of breathing. Contributing diagnoses includes - COPD Labs and images were reviewed. Patient Has recent ABG, which has been reviewed. Will treat underlying causes and adjust management of respiratory failure as follows-     Improved    COVID +  No clear diagnosis of COPD  Patient wears 3L 02 but only at night, has had to wear during the day.  Wells criteria 1.5    - Will use 02 as needed for respiratory support  - Goal 02 88-92%  - Treat Covid Per protocol   - PFTs ordered- perform as outpatient  - Home spiriva ordered  - Albuterol every 6 hours

## 2022-01-11 NOTE — ASSESSMENT & PLAN NOTE
Patient not on home blood pressure medications. Hypertensive to 156 while in the ED likely 2/2 alcohol withdrawal.    - Consider initiation of antihypertensives while admitted

## 2022-01-12 LAB
ALBUMIN SERPL BCP-MCNC: 3.8 G/DL (ref 3.5–5.2)
ALP SERPL-CCNC: 56 U/L (ref 55–135)
ALT SERPL W/O P-5'-P-CCNC: 39 U/L (ref 10–44)
ANION GAP SERPL CALC-SCNC: 11 MMOL/L (ref 8–16)
ANISOCYTOSIS BLD QL SMEAR: SLIGHT
AST SERPL-CCNC: 31 U/L (ref 10–40)
BASOPHILS # BLD AUTO: 0.01 K/UL (ref 0–0.2)
BASOPHILS NFR BLD: 0.1 % (ref 0–1.9)
BILIRUB SERPL-MCNC: 0.5 MG/DL (ref 0.1–1)
BUN SERPL-MCNC: 10 MG/DL (ref 8–23)
CALCIUM SERPL-MCNC: 8.7 MG/DL (ref 8.7–10.5)
CHLORIDE SERPL-SCNC: 101 MMOL/L (ref 95–110)
CO2 SERPL-SCNC: 24 MMOL/L (ref 23–29)
CREAT SERPL-MCNC: 0.8 MG/DL (ref 0.5–1.4)
DIFFERENTIAL METHOD: ABNORMAL
EOSINOPHIL # BLD AUTO: 0 K/UL (ref 0–0.5)
EOSINOPHIL NFR BLD: 0.2 % (ref 0–8)
ERYTHROCYTE [DISTWIDTH] IN BLOOD BY AUTOMATED COUNT: 15.9 % (ref 11.5–14.5)
EST. GFR  (AFRICAN AMERICAN): >60 ML/MIN/1.73 M^2
EST. GFR  (NON AFRICAN AMERICAN): >60 ML/MIN/1.73 M^2
GLUCOSE SERPL-MCNC: 163 MG/DL (ref 70–110)
HCT VFR BLD AUTO: 39.8 % (ref 37–48.5)
HGB BLD-MCNC: 12.5 G/DL (ref 12–16)
HYPOCHROMIA BLD QL SMEAR: ABNORMAL
IMM GRANULOCYTES # BLD AUTO: 0.08 K/UL (ref 0–0.04)
IMM GRANULOCYTES NFR BLD AUTO: 0.9 % (ref 0–0.5)
LYMPHOCYTES # BLD AUTO: 4.6 K/UL (ref 1–4.8)
LYMPHOCYTES NFR BLD: 51.6 % (ref 18–48)
MAGNESIUM SERPL-MCNC: 1.9 MG/DL (ref 1.6–2.6)
MCH RBC QN AUTO: 26.3 PG (ref 27–31)
MCHC RBC AUTO-ENTMCNC: 31.4 G/DL (ref 32–36)
MCV RBC AUTO: 84 FL (ref 82–98)
MONOCYTES # BLD AUTO: 0.6 K/UL (ref 0.3–1)
MONOCYTES NFR BLD: 6.3 % (ref 4–15)
NEUTROPHILS # BLD AUTO: 3.7 K/UL (ref 1.8–7.7)
NEUTROPHILS NFR BLD: 40.9 % (ref 38–73)
NRBC BLD-RTO: 0 /100 WBC
PHOSPHATE SERPL-MCNC: 3.3 MG/DL (ref 2.7–4.5)
PLATELET # BLD AUTO: 101 K/UL (ref 150–450)
PLATELET BLD QL SMEAR: ABNORMAL
PMV BLD AUTO: 10.6 FL (ref 9.2–12.9)
POCT GLUCOSE: 104 MG/DL (ref 70–110)
POCT GLUCOSE: 107 MG/DL (ref 70–110)
POCT GLUCOSE: 174 MG/DL (ref 70–110)
POCT GLUCOSE: 184 MG/DL (ref 70–110)
POCT GLUCOSE: 285 MG/DL (ref 70–110)
POTASSIUM SERPL-SCNC: 3.8 MMOL/L (ref 3.5–5.1)
PROT SERPL-MCNC: 6.6 G/DL (ref 6–8.4)
RBC # BLD AUTO: 4.75 M/UL (ref 4–5.4)
SODIUM SERPL-SCNC: 136 MMOL/L (ref 136–145)
WBC # BLD AUTO: 8.99 K/UL (ref 3.9–12.7)

## 2022-01-12 PROCEDURE — 85025 COMPLETE CBC W/AUTO DIFF WBC: CPT | Performed by: STUDENT IN AN ORGANIZED HEALTH CARE EDUCATION/TRAINING PROGRAM

## 2022-01-12 PROCEDURE — 94761 N-INVAS EAR/PLS OXIMETRY MLT: CPT

## 2022-01-12 PROCEDURE — 99900035 HC TECH TIME PER 15 MIN (STAT)

## 2022-01-12 PROCEDURE — 25000003 PHARM REV CODE 250: Performed by: STUDENT IN AN ORGANIZED HEALTH CARE EDUCATION/TRAINING PROGRAM

## 2022-01-12 PROCEDURE — 25000003 PHARM REV CODE 250: Performed by: INTERNAL MEDICINE

## 2022-01-12 PROCEDURE — 63600175 PHARM REV CODE 636 W HCPCS: Performed by: STUDENT IN AN ORGANIZED HEALTH CARE EDUCATION/TRAINING PROGRAM

## 2022-01-12 PROCEDURE — 12000002 HC ACUTE/MED SURGE SEMI-PRIVATE ROOM

## 2022-01-12 PROCEDURE — 99233 PR SUBSEQUENT HOSPITAL CARE,LEVL III: ICD-10-PCS | Mod: CR,,, | Performed by: INTERNAL MEDICINE

## 2022-01-12 PROCEDURE — 84100 ASSAY OF PHOSPHORUS: CPT | Performed by: STUDENT IN AN ORGANIZED HEALTH CARE EDUCATION/TRAINING PROGRAM

## 2022-01-12 PROCEDURE — 80053 COMPREHEN METABOLIC PANEL: CPT | Performed by: STUDENT IN AN ORGANIZED HEALTH CARE EDUCATION/TRAINING PROGRAM

## 2022-01-12 PROCEDURE — 25000003 PHARM REV CODE 250

## 2022-01-12 PROCEDURE — 27000207 HC ISOLATION

## 2022-01-12 PROCEDURE — 94640 AIRWAY INHALATION TREATMENT: CPT

## 2022-01-12 PROCEDURE — 36415 COLL VENOUS BLD VENIPUNCTURE: CPT | Performed by: STUDENT IN AN ORGANIZED HEALTH CARE EDUCATION/TRAINING PROGRAM

## 2022-01-12 PROCEDURE — 83735 ASSAY OF MAGNESIUM: CPT | Performed by: STUDENT IN AN ORGANIZED HEALTH CARE EDUCATION/TRAINING PROGRAM

## 2022-01-12 PROCEDURE — 99233 SBSQ HOSP IP/OBS HIGH 50: CPT | Mod: CR,,, | Performed by: INTERNAL MEDICINE

## 2022-01-12 RX ORDER — LORAZEPAM 0.5 MG/1
2 TABLET ORAL ONCE
Status: COMPLETED | OUTPATIENT
Start: 2022-01-12 | End: 2022-01-12

## 2022-01-12 RX ORDER — DIAZEPAM 5 MG/1
5 TABLET ORAL EVERY 8 HOURS
Status: DISCONTINUED | OUTPATIENT
Start: 2022-01-12 | End: 2022-01-14 | Stop reason: HOSPADM

## 2022-01-12 RX ORDER — DULOXETIN HYDROCHLORIDE 30 MG/1
30 CAPSULE, DELAYED RELEASE ORAL 2 TIMES DAILY
Status: DISCONTINUED | OUTPATIENT
Start: 2022-01-12 | End: 2022-01-14 | Stop reason: HOSPADM

## 2022-01-12 RX ADMIN — ALBUTEROL SULFATE 2 PUFF: 108 INHALANT RESPIRATORY (INHALATION) at 08:01

## 2022-01-12 RX ADMIN — LORAZEPAM 2 MG: 2 INJECTION INTRAMUSCULAR; INTRAVENOUS at 12:01

## 2022-01-12 RX ADMIN — ENOXAPARIN SODIUM 90 MG: 100 INJECTION, SOLUTION INTRAVENOUS; SUBCUTANEOUS at 08:01

## 2022-01-12 RX ADMIN — ACETAMINOPHEN 1000 MG: 500 TABLET ORAL at 11:01

## 2022-01-12 RX ADMIN — OXYCODONE HYDROCHLORIDE AND ACETAMINOPHEN 500 MG: 500 TABLET ORAL at 08:01

## 2022-01-12 RX ADMIN — FLUTICASONE FUROATE AND VILANTEROL TRIFENATATE 1 PUFF: 100; 25 POWDER RESPIRATORY (INHALATION) at 08:01

## 2022-01-12 RX ADMIN — ALBUTEROL SULFATE 2 PUFF: 108 INHALANT RESPIRATORY (INHALATION) at 01:01

## 2022-01-12 RX ADMIN — ALBUTEROL SULFATE 2 PUFF: 108 INHALANT RESPIRATORY (INHALATION) at 12:01

## 2022-01-12 RX ADMIN — LEVOTHYROXINE SODIUM 25 MCG: 0.03 TABLET ORAL at 05:01

## 2022-01-12 RX ADMIN — DIAZEPAM 10 MG: 5 TABLET ORAL at 08:01

## 2022-01-12 RX ADMIN — MULTIPLE VITAMINS W/ MINERALS TAB 1 TABLET: TAB at 08:01

## 2022-01-12 RX ADMIN — PANTOPRAZOLE SODIUM 40 MG: 40 TABLET, DELAYED RELEASE ORAL at 08:01

## 2022-01-12 RX ADMIN — MUPIROCIN: 20 OINTMENT TOPICAL at 08:01

## 2022-01-12 RX ADMIN — INSULIN ASPART 3 UNITS: 100 INJECTION, SOLUTION INTRAVENOUS; SUBCUTANEOUS at 05:01

## 2022-01-12 RX ADMIN — REMDESIVIR 100 MG: 100 INJECTION, POWDER, LYOPHILIZED, FOR SOLUTION INTRAVENOUS at 10:01

## 2022-01-12 RX ADMIN — LORAZEPAM 2 MG: 0.5 TABLET ORAL at 03:01

## 2022-01-12 RX ADMIN — DIAZEPAM 5 MG: 5 TABLET ORAL at 03:01

## 2022-01-12 RX ADMIN — DIAZEPAM 5 MG: 5 TABLET ORAL at 08:01

## 2022-01-12 RX ADMIN — DEXAMETHASONE 6 MG: 4 TABLET ORAL at 08:01

## 2022-01-12 RX ADMIN — FOLIC ACID 1 MG: 1 TABLET ORAL at 08:01

## 2022-01-12 RX ADMIN — INSULIN DETEMIR 2 UNITS: 100 INJECTION, SOLUTION SUBCUTANEOUS at 08:01

## 2022-01-12 RX ADMIN — DULOXETINE 30 MG: 30 CAPSULE, DELAYED RELEASE ORAL at 08:01

## 2022-01-12 RX ADMIN — TRAZODONE HYDROCHLORIDE 100 MG: 100 TABLET ORAL at 08:01

## 2022-01-12 NOTE — PLAN OF CARE
Problem: Adult Inpatient Plan of Care  Goal: Plan of Care Review  Outcome: Ongoing, Progressing  Goal: Patient-Specific Goal (Individualized)  Outcome: Ongoing, Progressing  Goal: Absence of Hospital-Acquired Illness or Injury  Outcome: Ongoing, Progressing  Goal: Optimal Comfort and Wellbeing  Outcome: Ongoing, Progressing  Goal: Readiness for Transition of Care  Outcome: Ongoing, Progressing     Problem: Diabetes Comorbidity  Goal: Blood Glucose Level Within Targeted Range  Outcome: Ongoing, Progressing     Problem: Gas Exchange Impaired  Goal: Optimal Gas Exchange  Outcome: Ongoing, Progressing     Problem: Fall Injury Risk  Goal: Absence of Fall and Fall-Related Injury  Outcome: Ongoing, Progressing     Problem: Alcohol Withdrawal  Goal: Alcohol Withdrawal Symptom Control  Outcome: Ongoing, Progressing

## 2022-01-12 NOTE — ASSESSMENT & PLAN NOTE
Will monitor for signs of withdrawal. History of alcohol withdrawal in the past. Endorses daily drinking.    Tremulous and tachycardic on exam.  Improved, will extend valium taper additional day or 2 per addiction psych    - Valium Taper per addiction psychiatry  - Follow ups as listed with addiction psychiatry  - Continue to monitor for signs of escalating withdrawal

## 2022-01-12 NOTE — CARE UPDATE
RAPID RESPONSE NURSE PROACTIVE ROUNDING NOTE       Time of Visit: 400    Admit Date: 2022  LOS: 4  Code Status: Full Code   Date of Visit: 2022  : 1960  Age: 61 y.o.  Sex: female  Race: White  Bed: 83564/37128 A:   MRN: [id number]  Was the patient discharged from an ICU this admission? No   Was the patient discharged from a PACU within last 24 hours? No   Did the patient receive conscious sedation/general anesthesia in last 24 hours? No  Was the patient in the ED within the past 24 hours? No  Was the patient on NIPPV within the past 24 hours? No   Attending Physician: Christen Prcotor MD  Primary Service: [facility] MED 4   Time spent at the bedside: 15 -30 min    SITUATION    Notified by charge RN via phone call.  Reason for alert: PIV Access  Called to evaluate the patient for PIV access    BACKGROUND     Why is the patient in the hospital?: Acute hypoxemic respiratory failure    Patient has a past medical history of Anemia, Anemia, Controlled type 2 diabetes mellitus without complication, without long-term current use of insulin, Depression, Diverticulitis, Fatty liver, GERD (gastroesophageal reflux disease), Hyperlipidemia, Hypertension, Pancreatitis, Peptic ulcer disease, Polysubstance abuse, Posterior reversible encephalopathy syndrome, Sarcoidosis of lung, Sarcoidosis of lung, Seizures, Suicide attempt, and Suicide ideation.    Last Vitals:  Temp: 98.3 °F (36.8 °C) (2340)  Pulse: 84 ( 0300)  Resp: 20 ( 005)  BP: 164/77 (0)  SpO2: 98 % (56)    24 Hours Vitals Range:  Temp:  [97.8 °F (36.6 °C)-98.3 °F (36.8 °C)]   Pulse:  []   Resp:  [18-21]   BP: (148-164)/(66-91)   SpO2:  [90 %-98 %]     Labs:  Recent Labs     01/10/22  0400 22  0454   WBC 5.03 6.97   HGB 11.7* 11.9*   HCT 38.7 39.1   * 122*       Recent Labs     01/10/22  0400 224    134*   K 3.6 3.5    99   CO2 26 27   CREATININE 0.8 0.8   * 166*   PHOS 2.6* 2.9    MG 1.7 1.7        No results for input(s): PH, PCO2, PO2, HCO3, POCSATURATED, BE in the last 72 hours.     ASSESSMENT    Upon initial assessment, pt. Laying in hospital bed with covid-19 precautions in place. Pt. Awake, alert, oriented x4, GCS 15, calm, cooperative, extremely pleasant. Pt. Denies complaints at this time.    Airway: patent, non obstructed, able to maintain secretions.  Breathing: non labored, speaking full sentences, equal chest rise and fall, negative for cough.  Circulation: without gross hemorrhage or bleeding noted externally, skin warm and dry to palpation.    Vital Signs:  HR: 72  BP: 159/69 (99)  SpO2: 93% RA  RR: 22  Afebrile    PIV access obtained per RRN - 22G. L. Hand. +Blood return, flushed easily with NS, no signs of infiltration. Charge RN updated. Please call RRN with questions/concerns/deterioration of patient.       Physical Exam  Eyes:      Extraocular Movements: Extraocular movements intact.      Pupils: Pupils are equal, round, and reactive to light.   Cardiovascular:      Rate and Rhythm: Normal rate and regular rhythm.   Pulmonary:      Effort: Pulmonary effort is normal.   Musculoskeletal:         General: Normal range of motion.   Skin:     General: Skin is warm and dry.      Capillary Refill: Capillary refill takes less than 2 seconds.   Neurological:      General: No focal deficit present.      Mental Status: She is alert and oriented to person, place, and time. Mental status is at baseline.   Psychiatric:         Mood and Affect: Mood normal.         Behavior: Behavior normal.         Thought Content: Thought content normal.         Judgment: Judgment normal.         INTERVENTIONS    PIV access.    RECOMMENDATIONS    N/a.    PROVIDER ESCALATION    Yes/No  no    Orders received and case discussed with NA.    Disposition: Remain in room 27509.    FOLLOW-UP    Charge Annamaria TAYLOR updated on plan of care. Instructed to call the Rapid Response Nurse, Eun Carnes RN at [phone]  for additional questions or concerns.

## 2022-01-12 NOTE — ASSESSMENT & PLAN NOTE
See acute hypoxemic respiratory failure     Class II - visualization of the soft palate, fauces, and uvula/with phonation

## 2022-01-12 NOTE — SUBJECTIVE & OBJECTIVE
Interval History: Still requiring benzos overnight per ciwa. Patients  on phone throughout examination. Patient gave explicit consent to discuss care while he was listening.    Review of Systems   Constitutional: Positive for activity change. Negative for chills, diaphoresis and fever.   HENT: Negative for congestion and sore throat.    Eyes: Negative for discharge and itching.   Respiratory: Negative for cough, shortness of breath and wheezing.    Cardiovascular: Negative for chest pain and palpitations.   Gastrointestinal: Negative for abdominal distention, abdominal pain, nausea and vomiting.   Genitourinary: Negative for difficulty urinating and dysuria.   Musculoskeletal: Negative for arthralgias, back pain and myalgias.   Skin: Negative for color change.   Neurological: Positive for tremors. Negative for dizziness, seizures and headaches.   Psychiatric/Behavioral: Positive for agitation and sleep disturbance. Negative for behavioral problems, confusion and hallucinations. The patient is nervous/anxious and is hyperactive.      Objective:     Vital Signs (Most Recent):  Temp: 98.2 °F (36.8 °C) (01/12/22 0453)  Pulse: 84 (01/12/22 0453)  Resp: (!) 21 (01/12/22 0453)  BP: (!) 174/83 (01/12/22 0453)  SpO2: (!) 93 % (01/12/22 0453) Vital Signs (24h Range):  Temp:  [97.8 °F (36.6 °C)-98.3 °F (36.8 °C)] 98.2 °F (36.8 °C)  Pulse:  [] 84  Resp:  [18-21] 21  SpO2:  [90 %-98 %] 93 %  BP: (148-174)/(66-91) 174/83     Weight: 88.5 kg (195 lb 1.7 oz)  Body mass index is 31.49 kg/m².    Intake/Output Summary (Last 24 hours) at 1/12/2022 0712  Last data filed at 1/11/2022 1800  Gross per 24 hour   Intake 420 ml   Output --   Net 420 ml      Physical Exam  Vitals and nursing note reviewed. Exam conducted with a chaperone present.   Constitutional:       Appearance: Normal appearance. She is obese. She is ill-appearing.   HENT:      Head: Normocephalic and atraumatic.      Right Ear: External ear normal.      Left  Ear: External ear normal.      Nose: Congestion and rhinorrhea present.      Mouth/Throat:      Pharynx: Oropharynx is clear.   Eyes:      Conjunctiva/sclera: Conjunctivae normal.   Cardiovascular:      Rate and Rhythm: Regular rhythm. Tachycardia present.      Pulses: Normal pulses.      Heart sounds: Normal heart sounds.   Pulmonary:      Effort: Respiratory distress present.      Breath sounds: Wheezing present.   Chest:      Comments: Bilateral mastectomy scars with past reconstruction noted. The scars are not hypertrophic and well healed and all incisions are CDI with no signs of overlying erythema or infection   Abdominal:      General: Abdomen is flat. There is no distension.      Palpations: Abdomen is soft.      Tenderness: There is no abdominal tenderness.   Musculoskeletal:         General: No swelling, deformity or signs of injury.   Skin:     General: Skin is warm.      Findings: No erythema or rash.   Neurological:      General: No focal deficit present.      Mental Status: She is alert and oriented to person, place, and time. Mental status is at baseline.      Comments: Tremulous   Psychiatric:         Behavior: Behavior normal.         Thought Content: Thought content normal.      Comments: Anxious         Significant Labs: All pertinent labs within the past 24 hours have been reviewed.    Significant Imaging: I have reviewed all pertinent imaging results/findings within the past 24 hours.

## 2022-01-12 NOTE — ASSESSMENT & PLAN NOTE
ASSESSMENT:     DIAGNOSES & PROBLEMS  Alcohol Use Disorder, current, severe  Cannabis Use Disorder   Depressive disorder, Unspecified       STRENGTHS AND LIABILITIES   Strength: Patient has positive support network., Liability: Patient is unstable., Liability: Patient lacks coping skills.      MOTIVATION TO PURSUE RECOVERY: low; not interested in rehab at this time    ACCEPTANCE OF ADDICTION: limited    ABILITY TO ADHERE TO TREATMENT PLAN: low      PLAN:       MANAGEMENT PLAN, TREATMENT GOALS, THERAPEUTIC TECHNIQUES/APPROACHES & CLINICAL REASONING    SCHEDULED MEDICATIONS:   - Continue Valium taper as below  · 1/11: 10mg Q8H  · 1/12: 10 mg in the morning, two doses of 5 mg spaced 8 hours apart  · 1/13: 5mg Q8H  · 1/14: 5mg Q12H    - Per home medications: Can continue home medications (Trazadone 100mg nightly)  - Would increase Cymbalta to 30 mg BID        · Patient counseled on abstinence from alcohol and substances of abuse (illicit and prescription).  · Additional workup planned to address substance use disorder, in order to guide and refine ongoing management options, includes serial alcohol and drug laboratory testing (e.g. PETH, urine toxicology).  · Relapse prevention and motivational interviewing provided.  · Education provided on 12 step recovery programs.      PRESCRIPTION DRUG MANAGEMENT  - The risks and benefits of medication were discussed with this patient.  - Possible expectable adverse effects of any current or proposed individual psychotropic agents were discussed with this patient.  - Counseling was provided on the importance of full compliance with medication regimens.    ALCOHOL WITHDRAWAL PROTOCOL    General Principles     The following recommendations should be seen as rough guidelines.  An alcohol withdrawal protocol should be individualized and modified as clinically warranted.   In the absence of severe liver disease and gross over sedation, err on the side of giving more than less  benzodiazepine.   In addition to treatment with benzodiazepines, provide supportive care (assure adequate fluid and electrolyte replacement, give thiamine [parenteral initially] and multivitamin supplements, ensure adequate caloric support).  Remember to not give glucose prior to thiamine as this could precipitate Wernicke-Korsakoff Syndrome.   Frequent assessment with the CIWA-Ar is the standard of care and the hallmark of excellent clinical practice.    Asymptomatic or minimally symptomatic patients at lower risk for complicated withdrawal    Asymptomatic or minimally symptomatic patients at lower risk for seizures or delirium tremens who are being admitted to the hospital for other reasons should be closely monitored and treated with oral diazepam 10mg every hour as needed when a score of 8 or greater is achieved on the CIWA-Ar.  Frequent need for administration of diazepam or CIWA-Ar scores >15 should trigger possible reclassification of the patient as at higher risk for complicated withdrawal.  If withdrawal does not progress over the first 24-48 hours, CIWA-Ar can be administered less frequently - an interval of four to six hours is reasonable.    Asymptomatic or minimally symptomatic patients at higher risk for complicated withdrawal    Patients with a history of seizures, delirium tremens, or prolonged heavy alcohol consumption, who are minimally symptomatic or asymptomatic and are admitted to the hospital for other reasons, can be prophylactically treated with oral diazepam (or with oral lorazepam if severe liver disease is present).    For prophylaxis, the following are protypical regimens (but actual regimens should be individualized and modified as clinically warranted).  Diazepam 10-20mg PO q6 x1 day, then 10mg PO q6 x1 day, then 5mg PO q6 x1 day  Lorazepam 2mg PO q4 x1 day, then 2mg PO q6 x1 day, then 1mg PO q6 x 1 day    In addition to the above fixed dose regimen, patients should be reassessed  frequently, and additional doses of medication given each hour if a score of 8 or greater is achieved on the CIWA-Ar.  If such scores are reached, prophylaxis has failed, and patients should be treated for active withdrawal.  If withdrawal does not progress over the first 24-48 hours, CIWA-Ar can be administered less frequently - an interval of four to six hours is reasonable.    Factors predicting higher risk for complicated withdrawal    - A history of sustained drinking  - A history of alcohol withdrawal seizures  - A history of delirium tremens  - Age greater than 30 years  - The presence of concurrent illness  - The presence of significant alcohol withdrawal in the presence of an elevated blood alcohol concentration  - A longer period since the last drink (patients who present with alcohol withdrawal more than two days after their last drink are more likely to experience delirium tremens than those who present within two days).    Active complicated alcohol withdrawal    - Rule out and treat alternative diagnoses.  - Identify and correct metabolic derangements and volume deficits.  - Supportive care, including IV fluids, nutritional and vitamin supplementation, and frequent clinical reassessment including CIWA-Ar and vital signs, is critical.  - Determine if ICU monitoring is warranted    All patients with seizures or delirium tremens require initial IV therapy with benzodiazepines.  Patients can be converted to PO benzodiazepines once sustained stabilization is achieved.  The benzodiazepine should be titrated to effect - ideally the goal is light sedation to a degree that ensures safety and comfort but does not obscure the neurologic examination.  Older and more medially complicated patients may benefit from heavier sedation, but would then require closer monitoring, likely in an ICU setting    Initial Dosing Schedule  Diazepam 5 to 10mg IV, repeated every 5 to 10 minutes OR  Lorazepam 2 to 4mg IV, repeated  every 15 to 20 minutes    The benzodiazepine is given until the appropriate level of sedation is achieved.  In severe cases, select patients may require massive doses (>500mg of diazepam) to achieve initial control of symptoms and continued aggressive use of benzodiazepines thereafter (>2000mg diazepam over 48 hours).    Once initial control is achieved, a symptom-triggered approach is recommended utilizing the CIWA-Ar.  Evaluation intervals as frequent as every 10 to 15 minutes are appropriate for patients with more severe symptoms being treated with IV benzodiazepines.  Once severe symptoms are controlled, hourly reassessment of such patients is reasonable.  By contrast, an interval of four to six hours is reasonable for stable patients with mild symptoms receiving oral benzodiazepines.    When the score is elevated (any score of 8 or greater on the CIWA-Ar), additional medication is given.  For acute withdrawal, give diazepam 5 to 10mg IV (alternatively lorazepam 2 to 4mg IV in patients with severe liver disease).  Patients who are progressing adequately and have been converted to oral benzodiazepines can receive diazepam 10 to 20mg (alternatively lorazepam 2 to 4mg) when the CIWA-Ar is triggered.    If the patient is incapacitated and the CIWA-Ar cannot be used, instead use the Royal Agitation-Sedation Scale (RASS) with a goal of 0 to -2 on this scale.    Refractory Delirium Tremens    Likely present if symptoms of severe withdrawal are not controlled adequately after the IV administration of more than 50mg of diazepam or 10mg of lorazepam during the first hour of treatment, or 200mg of diazepam or 40mg of lorazepam during the initial three to four hours of treatment.    Barbiturates (specifically phenobarbital) can be very effective in this population when given in conjunction with a benzodiazepine - they are thought to work synergistically.  A typical dose of phenobarbital would be 130 to 260mg IV, repeated  every 15 to 20 minutes, until symptoms are controlled.    Another reasonable alternative is propofol.  A newer approach is the use of dexmedetomidine, though evidence is not as robust as for phenobarbital and propofol, so caution is advised at this time.    Tracheal intubation and mechanical ventilation are frequently necessary if phenobarbital, propofol, or dexmedetomidine are utilized.    Post-Withdrawal Treatment    Medically supervised withdrawal manages the patient through the withdrawal symptoms but does not treat alcohol use disorder.  Patients completing withdrawal are at high risk of relapse to resumed alcohol consumption.  Patients should be given explicit plans for follow-up care prior to discharge from the hospital; follow-up may involve ongoing treatment through primary care or a specialized alcohol treatment program or physician.  Continuing care, including pharmacotherapy and psychosocial intervention for alcohol use disorder, are indicated.        In cases of emergency, daily coverage provided by Acute/ER Psych MD, NP, or DALILA, with contact numbers located in [facility] On Call Schedule    Case discussed with staff addiction psychiatrist: Liborio Patel MD

## 2022-01-12 NOTE — CARE UPDATE
Patients  on phone during exam. Explicit consent given to share all medical information while he was listening including that about her current alcohol withdrawal treatment.     Donny Lee MD

## 2022-01-12 NOTE — SUBJECTIVE & OBJECTIVE
"  OBJECTIVE:     EXAMINATION    Vitals:    01/11/22 1131 01/11/22 1315 01/11/22 1504 01/11/22 1537   BP: (!) 148/66   (!) 151/75   BP Location:       Patient Position: Lying   Lying   Pulse: 77 83 101 80   Resp:  18     Temp: 98.3 °F (36.8 °C)   97.8 °F (36.6 °C)   TempSrc: Oral   Oral   SpO2: (!) 94% 97%  (!) 93%   Weight:       Height:           CONSTITUTIONAL  General Appearance and Manner: Notably tremulous, elderly female lying in bed drowsy but in no acute distress     MUSCULOSKELETAL  Abnormal Involuntary Movements: Mild upper extremity tremor  Muscle Strength and Tone: Grossly equal bilaterally  Gait and Station: unable to assess, as patient is lying in bed     PSYCHIATRIC   Orientation: Alert and oriented x 3 to self, place, and year  Speech: Soft volume, monotone  Language: English  Mood: "Sad", tearful  Affect: constricted  Thought Process: Linear  Thought Content: Denies SI, HI, or AVH  Memory: Grossly intact  Attention and Concentration: Grossly intact  Fund of Knowledge: Consistent with level of education  Insight: Fair  Judgment: Fair        LABS/IMAGING/STUDIES   Recent Results (from the past 24 hour(s))   POCT glucose    Collection Time: 01/10/22  9:15 PM   Result Value Ref Range    POCT Glucose 117 (H) 70 - 110 mg/dL   Comprehensive Metabolic Panel (CMP)    Collection Time: 01/11/22  4:54 AM   Result Value Ref Range    Sodium 134 (L) 136 - 145 mmol/L    Potassium 3.5 3.5 - 5.1 mmol/L    Chloride 99 95 - 110 mmol/L    CO2 27 23 - 29 mmol/L    Glucose 166 (H) 70 - 110 mg/dL    BUN 12 8 - 23 mg/dL    Creatinine 0.8 0.5 - 1.4 mg/dL    Calcium 8.9 8.7 - 10.5 mg/dL    Total Protein 6.3 6.0 - 8.4 g/dL    Albumin 3.7 3.5 - 5.2 g/dL    Total Bilirubin 0.4 0.1 - 1.0 mg/dL    Alkaline Phosphatase 59 55 - 135 U/L    AST 27 10 - 40 U/L    ALT 41 10 - 44 U/L    Anion Gap 8 8 - 16 mmol/L    eGFR if African American >60.0 >60 mL/min/1.73 m^2    eGFR if non African American >60.0 >60 mL/min/1.73 m^2   Magnesium    " Collection Time: 01/11/22  4:54 AM   Result Value Ref Range    Magnesium 1.7 1.6 - 2.6 mg/dL   Phosphorus    Collection Time: 01/11/22  4:54 AM   Result Value Ref Range    Phosphorus 2.9 2.7 - 4.5 mg/dL   CBC with Automated Differential    Collection Time: 01/11/22  4:54 AM   Result Value Ref Range    WBC 6.97 3.90 - 12.70 K/uL    RBC 4.54 4.00 - 5.40 M/uL    Hemoglobin 11.9 (L) 12.0 - 16.0 g/dL    Hematocrit 39.1 37.0 - 48.5 %    MCV 86 82 - 98 fL    MCH 26.2 (L) 27.0 - 31.0 pg    MCHC 30.4 (L) 32.0 - 36.0 g/dL    RDW 15.9 (H) 11.5 - 14.5 %    Platelets 122 (L) 150 - 450 K/uL    MPV 10.7 9.2 - 12.9 fL    Immature Granulocytes 1.0 (H) 0.0 - 0.5 %    Gran # (ANC) 2.4 1.8 - 7.7 K/uL    Immature Grans (Abs) 0.07 (H) 0.00 - 0.04 K/uL    Lymph # 3.8 1.0 - 4.8 K/uL    Mono # 0.5 0.3 - 1.0 K/uL    Eos # 0.1 0.0 - 0.5 K/uL    Baso # 0.03 0.00 - 0.20 K/uL    nRBC 0 0 /100 WBC    Gran % 35.1 (L) 38.0 - 73.0 %    Lymph % 54.5 (H) 18.0 - 48.0 %    Mono % 7.7 4.0 - 15.0 %    Eosinophil % 1.3 0.0 - 8.0 %    Basophil % 0.4 0.0 - 1.9 %    Differential Method Automated    POCT glucose    Collection Time: 01/11/22  7:45 AM   Result Value Ref Range    POCT Glucose 85 70 - 110 mg/dL   POCT glucose    Collection Time: 01/11/22 11:31 AM   Result Value Ref Range    POCT Glucose 140 (H) 70 - 110 mg/dL   POCT glucose    Collection Time: 01/11/22  5:08 PM   Result Value Ref Range    POCT Glucose 270 (H) 70 - 110 mg/dL      Imaging Results          X-Ray Chest AP Portable (Final result)  Result time 01/08/22 13:33:18    Final result by James Sotelo DO (01/08/22 13:33:18)                 Impression:      Please see above      Electronically signed by: James Sotelo DO  Date:    01/08/2022  Time:    13:33             Narrative:    EXAMINATION:  XR CHEST AP PORTABLE    CLINICAL HISTORY:  COVID-19;    TECHNIQUE:  Single frontal view of the chest was performed.    COMPARISON:  01/08/2022    FINDINGS:  No significant change from prior.   Continued surgical clips overlying the cardiomediastinal silhouette and lower chest wall.  There is no new lung opacity allowing for differences in technique.  No large pleural effusion or pneumothorax.  Continued atherosclerotic aorta.  Clinical correlation and follow-up advised.

## 2022-01-12 NOTE — PROGRESS NOTES
[facility] - Intensive Care ([place] [address])  Psychiatry  Progress Note    Patient Name: Earl Abdul  MRN: [id number]   Code Status: Full Code  Admission Date: 1/8/2022  Hospital Length of Stay: 3 days  Expected Discharge Date: 1/12/2022  Attending Physician: Christen Proctor MD  Primary Care Provider: Andrew Rodriguez MD    Current Legal Status: Uncontested    Patient information was obtained from patient.       Subjective:     Patient is a 61 y.o., female, presents with:    Principal Problem:Acute hypoxemic respiratory failure    Chief Complaint: As above    HPI:   1/9/2022 1:49 PM  Earl Abdul  1960  [id number]      ADDICTION CONSULT INITIAL EVALUATION     DEPARTMENT:  Psychiatry  SITE: [facility], [address]    DATE OF ADMISSION: 1/8/2022 11:08 AM  LENGTH OF STAY: 1 days    EXAMINING PRACTITIONER: Kari Eason    CONSULT REQUESTED BY: Christen Proctor MD      SUBJECTIVE     CHIEF COMPLAINT  Earl Abdul is a 61 y.o. female who is seen today for an initial psychiatric evaluation by the addiction psychiatry consult service for alcohol withdrawal  Earl Abdul presents with the chief complaint of: SOB and COVID+      HISTORY OF PRESENT ILLNESS    Per Primary MD:  61-year-old female with COPD (per patient), GERD, hyperlipidemia, hypertension, diabetes alcohol withdrawal and depression which presents the emergency room with worsening shortness of breath over the past 3 days.  Patient states that she went to Urgent Care and tested positive for COVID and due to her low oxygen level they advised her to come to the emergency room.  Patient states that she usually wears 3 L of O2 at night but not during the day.  The patient also endorses nausea and dry heaving. In the past 3 days she has had to use oxygen 24 hours a day.  Her  tested positive 3 days ago. On interview the patient endorses a significant recent history of daily alcohol consumption with last drink being  "approximately 24 hours ago     Per urgent care  The current episode started 1 to 4 weeks ago. Associated symptoms include headaches, myalgias, nasal congestion, postnasal drip and shortness of breath. Pertinent negatives include no chest pain, ear congestion, ear pain, fever, sore throat, sweats, weight loss or wheezing. She has tried nothing for the symptoms. The treatment provided no relief. Her past medical history is significant for COPD.      CXR No significant change from prior.  Continued surgical clips overlying the cardiomediastinal silhouette and lower chest wall.  There is no new lung opacity allowing for differences in technique.  No large pleural effusion or pneumothorax.  Continued atherosclerotic aorta.  Clinical correlation and follow-up advised.     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 30 year smoking history. Current drinker with history of alcohol withdrawal       Per Addiction Psych MD:  On initial interview, patient is lying in bed resting. She is calm but notably tremulous and somewhat drowsy. Agrees to interview and evaluation by psychiatry. She reports she came to hospital for SOB due to COVID. When discussing alcohol use, patient states she drinks apprx "a fifth a day"; attributes recent increase in alcohol 2/2 feeling "depressed, especially during the holidays". Reports she was in rehab apprx 6 months ago and was able to stay sober for 4 months after but subsequently relapsed. When discussed rehab in future, she declines and states "I can do it on my own this time". Denies any active SI or HI currently but does report intermittent SI thoughts in past when younger. Denies AVH. Patient lives at home with  who she cites as " ""very supportive".     Reports pain specialist doctor "Dr. Aguilera" prescribes her Pristiq 30mg which she takes daily. (Of note, on chart review, patient reports other psychiatric medications of Cymbalta and Trazadone). Previously seen in [facility]BU.     On admission: BAL is 175, UDS positive for THC and Benzodiazepines (possibly from ED).   On Interview, patient's vitals are: 92% O2 saturation, 104 HR, and 145/64 BP.     COLLATERAL  None    SUBSTANCE ABUSE HISTORY  Substance(s) of Choice: Alcohol, THC   Substances Used: Alcohol  History of IVDU?: Denies  Use of Alcohol: Yes  Average Consumption: 1/5th liquor/day  Last Drink: Prior to ED Presentation (1 day ago)  Use of Medications for Alcohol/Opioid Use Disorder: Denies  History of Complicated Withdrawal: Yes, confirms hx of seizure  History of Detox: Yes   Rehab History: Yes apprx 6 months ago  AA/NA involvement: Yes per chart review  Tobacco: Reports 3 cigarettes a day and vaping  Spouse/Partner Consumption: Yes, reports some social consumption (states "outside the house") of alcohol by   Patient Aware of Biomedical Complications: Yes    DSM-5 SUBSTANCE USE DISORDER CRITERIA   Mild (1-3), Moderate (4-5), Severe (?6)  1. Often take in larger amounts or over a longer period of time than was intended.  2. Persistent desire or unsuccessful efforts to cut down or control use.  3. Great deal of time spent in activities necessary to obtain substance, use, or recover from effects.  4. Craving/strong desire for substance or urge to use.  5. Use resulting in failure to fulfill major role obligations at home, work or school.  6. Social, occupational, recreational activities decreased because of use.  7. Continued use despite having persistent or recurrent social or interpersonal problems cause or exaserbated by the substance.  8. Recurrent use in situations in which it is physically hazardous.  9. Use despite physical or psychological problems that are likely to have been " "caused or exacerbated by the substance.  10. Tolerance, as defined by either of the following.   A. A need for markedly increased amounts of substance to achieve intoxication or desired effect. -OR-    B. A markedly diminished effect with continued use of the same amount of substance.  11. Withdrawal, as manifested by the following.   A. The characteristic withdrawal syndrome for substance. -AND-   B. Substance is taken to relieve or avoid withdrawal symptoms.    ARE THE CRITERIA MET FOR DSM-5 SUBSTANCE USE DISORDER: Severe      PSYCHIATRIC HISTORY  Reported Diagnose(s): Depression, Anxiety  Previous Medication Trials: Pristiq, Cymbalta, Trazadone  Previous Psychiatric Hospitalizations: Denies  Outpatient Psychiatrist?: Denies; reports Pain specialist "Dr Aguilera" rx medications?; Per chart review, patient was previously seen in [facility] by Dr. Cortes       SUICIDE/HOMICIDE RISK ASSESSMENT  Current/active suicidal ideation/plan/intent: No  Previous suicide attempts: Yes, "many years ago"  Current/active homicidal ideation/plan/intent: No      HISTORY OF TRAUMA, ABUSE & VIOLENCE  Trauma: Unable to assess  Physical Abuse: Unable to assess  Sexual Abuse: Unable to Assess  Violent Conduct: Denies    Access to Gun: Denies       FAMILY PSYCHIATRIC HISTORY   Unable to Assess       SOCIAL HISTORY  Lives with   Is an Artist   Had 2 children (1 )  Cites tobacco use (3 cigarettes/day for 30+ years) and vaping (nicotine and cannabis)  Confirms daily EtOH Use (1/5th liquor/day)        PAST MEDICAL HISTORY   GERD, HLD, HTN, DM, EtOH Use Disorder and Withdrawal      PSYCHOSOCIAL FACTORS  Stressors (Psychosocial and Environmental): drug and alcohol.             Hospital Course: 1/10: Chart reviewed. Patient continues to require multiple doses of Ativan PRN for withdrawal symptoms in the last 24 hours, despite Valium taper. Today pt reports nausea, headache, tremor, and insomnia. She is somewhat irritable this morning. " "Specifically, patient states she takes 300 mg Trazodone at home and feels like the current dose here is not enough. She is still unwilling to seek rehab placement but expresses interest in [facility]. No other psychiatric complaints at this time. Denies SI/HI/AVH.    1/11: Chart reviewed. Patient still requiring multiple PRNs for breakthrough withdrawal. Valium taper increased to 10 mg q8 hours today. Patient reports she continues to have significant withdrawal symptoms, including tremors, irritability, and nausea. Tremors visible in upper extremities and are present even when patient is distracted. She denies headache or perceptual disturbances. She is tearful at the time of my evaluation, stating she is very sad about the conditions in her life right now, including her failure to maintain sobriety, the recent sudden death of her son from overdose, and her recent mastectomy, which has left her feeling disfigured. She requests an increase in her Cymbalta due to her depression. Patient denies SI/HI/AVH.        OBJECTIVE:     EXAMINATION    Vitals:    01/11/22 1131 01/11/22 1315 01/11/22 1504 01/11/22 1537   BP: (!) 148/66   (!) 151/75   BP Location:       Patient Position: Lying   Lying   Pulse: 77 83 101 80   Resp:  18     Temp: 98.3 °F (36.8 °C)   97.8 °F (36.6 °C)   TempSrc: Oral   Oral   SpO2: (!) 94% 97%  (!) 93%   Weight:       Height:           CONSTITUTIONAL  General Appearance and Manner: Notably tremulous, elderly female lying in bed drowsy but in no acute distress     MUSCULOSKELETAL  Abnormal Involuntary Movements: Mild upper extremity tremor  Muscle Strength and Tone: Grossly equal bilaterally  Gait and Station: unable to assess, as patient is lying in bed     PSYCHIATRIC   Orientation: Alert and oriented x 3 to self, place, and year  Speech: Soft volume, monotone  Language: English  Mood: "Sad", tearful  Affect: constricted  Thought Process: Linear  Thought Content: Denies SI, HI, or " [facility]  Memory: Grossly intact  Attention and Concentration: Grossly intact  Fund of Knowledge: Consistent with level of education  Insight: Fair  Judgment: Fair        LABS/IMAGING/STUDIES   Recent Results (from the past 24 hour(s))   POCT glucose    Collection Time: 01/10/22  9:15 PM   Result Value Ref Range    POCT Glucose 117 (H) 70 - 110 mg/dL   Comprehensive Metabolic Panel (CMP)    Collection Time: 01/11/22  4:54 AM   Result Value Ref Range    Sodium 134 (L) 136 - 145 mmol/L    Potassium 3.5 3.5 - 5.1 mmol/L    Chloride 99 95 - 110 mmol/L    CO2 27 23 - 29 mmol/L    Glucose 166 (H) 70 - 110 mg/dL    BUN 12 8 - 23 mg/dL    Creatinine 0.8 0.5 - 1.4 mg/dL    Calcium 8.9 8.7 - 10.5 mg/dL    Total Protein 6.3 6.0 - 8.4 g/dL    Albumin 3.7 3.5 - 5.2 g/dL    Total Bilirubin 0.4 0.1 - 1.0 mg/dL    Alkaline Phosphatase 59 55 - 135 U/L    AST 27 10 - 40 U/L    ALT 41 10 - 44 U/L    Anion Gap 8 8 - 16 mmol/L    eGFR if African American >60.0 >60 mL/min/1.73 m^2    eGFR if non African American >60.0 >60 mL/min/1.73 m^2   Magnesium    Collection Time: 01/11/22  4:54 AM   Result Value Ref Range    Magnesium 1.7 1.6 - 2.6 mg/dL   Phosphorus    Collection Time: 01/11/22  4:54 AM   Result Value Ref Range    Phosphorus 2.9 2.7 - 4.5 mg/dL   CBC with Automated Differential    Collection Time: 01/11/22  4:54 AM   Result Value Ref Range    WBC 6.97 3.90 - 12.70 K/uL    RBC 4.54 4.00 - 5.40 M/uL    Hemoglobin 11.9 (L) 12.0 - 16.0 g/dL    Hematocrit 39.1 37.0 - 48.5 %    MCV 86 82 - 98 fL    MCH 26.2 (L) 27.0 - 31.0 pg    MCHC 30.4 (L) 32.0 - 36.0 g/dL    RDW 15.9 (H) 11.5 - 14.5 %    Platelets 122 (L) 150 - 450 K/uL    MPV 10.7 9.2 - 12.9 fL    Immature Granulocytes 1.0 (H) 0.0 - 0.5 %    Gran # (ANC) 2.4 1.8 - 7.7 K/uL    Immature Grans (Abs) 0.07 (H) 0.00 - 0.04 K/uL    Lymph # 3.8 1.0 - 4.8 K/uL    Mono # 0.5 0.3 - 1.0 K/uL    Eos # 0.1 0.0 - 0.5 K/uL    Baso # 0.03 0.00 - 0.20 K/uL    nRBC 0 0 /100 WBC    Gran % 35.1 (L) 38.0 -  73.0 %    Lymph % 54.5 (H) 18.0 - 48.0 %    Mono % 7.7 4.0 - 15.0 %    Eosinophil % 1.3 0.0 - 8.0 %    Basophil % 0.4 0.0 - 1.9 %    Differential Method Automated    POCT glucose    Collection Time: 01/11/22  7:45 AM   Result Value Ref Range    POCT Glucose 85 70 - 110 mg/dL   POCT glucose    Collection Time: 01/11/22 11:31 AM   Result Value Ref Range    POCT Glucose 140 (H) 70 - 110 mg/dL   POCT glucose    Collection Time: 01/11/22  5:08 PM   Result Value Ref Range    POCT Glucose 270 (H) 70 - 110 mg/dL      Imaging Results          X-Ray Chest AP Portable (Final result)  Result time 01/08/22 13:33:18    Final result by James Sotelo DO (01/08/22 13:33:18)                 Impression:      Please see above      Electronically signed by: James Sotelo DO  Date:    01/08/2022  Time:    13:33             Narrative:    EXAMINATION:  XR CHEST AP PORTABLE    CLINICAL HISTORY:  COVID-19;    TECHNIQUE:  Single frontal view of the chest was performed.    COMPARISON:  01/08/2022    FINDINGS:  No significant change from prior.  Continued surgical clips overlying the cardiomediastinal silhouette and lower chest wall.  There is no new lung opacity allowing for differences in technique.  No large pleural effusion or pneumothorax.  Continued atherosclerotic aorta.  Clinical correlation and follow-up advised.                                     Scheduled Medications:   albuterol  2 puff Inhalation Q6H    ascorbic acid (vitamin C)  500 mg Oral BID    dexAMETHasone  6 mg Oral Daily    diazePAM  10 mg Oral Q8H    Followed by    [START ON 1/12/2022] diazePAM  10 mg Oral Q12H    DULoxetine  30 mg Oral Daily    enoxaparin  90 mg Subcutaneous Q12H    fluticasone furoate-vilanteroL  1 puff Inhalation Daily    folic acid  1 mg Oral Daily    insulin detemir U-100  2 Units Subcutaneous QHS    levothyroxine  25 mcg Oral Before breakfast    multivitamin  1 tablet Oral Daily    mupirocin   Nasal BID    pantoprazole  40 mg Oral  Daily    remdesivir infusion  100 mg Intravenous Daily    traZODone  100 mg Oral QHS       PRN Medications:  sodium chloride 0.9%, acetaminophen, benzonatate, dextrose 50%, dextrose 50%, glucagon (human recombinant), glucose, glucose, hydrALAZINE, insulin aspart U-100, lorazepam, naloxone, sodium chloride 0.9%    Review of patient's allergies indicates:   Allergen Reactions    Lortab  [hydrocodone-acetaminophen] Itching    Promethazine Other (See Comments) and Itching     Other reaction(s): Itching       Assessment/Plan:     Alcohol use disorder, severe, dependence    ASSESSMENT:     DIAGNOSES & PROBLEMS  Alcohol Use Disorder, current, severe  Cannabis Use Disorder   Depressive disorder, Unspecified       STRENGTHS AND LIABILITIES   Strength: Patient has positive support network., Liability: Patient is unstable., Liability: Patient lacks coping skills.      MOTIVATION TO PURSUE RECOVERY: low; not interested in rehab at this time    ACCEPTANCE OF ADDICTION: limited    ABILITY TO ADHERE TO TREATMENT PLAN: low      PLAN:       MANAGEMENT PLAN, TREATMENT GOALS, THERAPEUTIC TECHNIQUES/APPROACHES & CLINICAL REASONING    SCHEDULED MEDICATIONS:   - Continue Valium taper as below  · 1/11: 10mg Q8H  · 1/12: 10 mg in the morning, two doses of 5 mg spaced 8 hours apart  · 1/13: 5mg Q8H  · 1/14: 5mg Q12H    - Per home medications: Can continue home medications (Trazadone 100mg nightly)  - Would increase Cymbalta to 30 mg BID        · Patient counseled on abstinence from alcohol and substances of abuse (illicit and prescription).  · Additional workup planned to address substance use disorder, in order to guide and refine ongoing management options, includes serial alcohol and drug laboratory testing (e.g. PETH, urine toxicology).  · Relapse prevention and motivational interviewing provided.  · Education provided on 12 step recovery programs.      PRESCRIPTION DRUG MANAGEMENT  - The risks and benefits of medication were  discussed with this patient.  - Possible expectable adverse effects of any current or proposed individual psychotropic agents were discussed with this patient.  - Counseling was provided on the importance of full compliance with medication regimens.    ALCOHOL WITHDRAWAL PROTOCOL    General Principles     The following recommendations should be seen as rough guidelines.  An alcohol withdrawal protocol should be individualized and modified as clinically warranted.   In the absence of severe liver disease and gross over sedation, err on the side of giving more than less benzodiazepine.   In addition to treatment with benzodiazepines, provide supportive care (assure adequate fluid and electrolyte replacement, give thiamine [parenteral initially] and multivitamin supplements, ensure adequate caloric support).  Remember to not give glucose prior to thiamine as this could precipitate Wernicke-Korsakoff Syndrome.   Frequent assessment with the CIWA-Ar is the standard of care and the hallmark of excellent clinical practice.    Asymptomatic or minimally symptomatic patients at lower risk for complicated withdrawal    Asymptomatic or minimally symptomatic patients at lower risk for seizures or delirium tremens who are being admitted to the hospital for other reasons should be closely monitored and treated with oral diazepam 10mg every hour as needed when a score of 8 or greater is achieved on the CIWA-Ar.  Frequent need for administration of diazepam or CIWA-Ar scores >15 should trigger possible reclassification of the patient as at higher risk for complicated withdrawal.  If withdrawal does not progress over the first 24-48 hours, CIWA-Ar can be administered less frequently - an interval of four to six hours is reasonable.    Asymptomatic or minimally symptomatic patients at higher risk for complicated withdrawal    Patients with a history of seizures, delirium tremens, or prolonged heavy alcohol consumption, who are  minimally symptomatic or asymptomatic and are admitted to the hospital for other reasons, can be prophylactically treated with oral diazepam (or with oral lorazepam if severe liver disease is present).    For prophylaxis, the following are protypical regimens (but actual regimens should be individualized and modified as clinically warranted).  Diazepam 10-20mg PO q6 x1 day, then 10mg PO q6 x1 day, then 5mg PO q6 x1 day  Lorazepam 2mg PO q4 x1 day, then 2mg PO q6 x1 day, then 1mg PO q6 x 1 day    In addition to the above fixed dose regimen, patients should be reassessed frequently, and additional doses of medication given each hour if a score of 8 or greater is achieved on the CIWA-Ar.  If such scores are reached, prophylaxis has failed, and patients should be treated for active withdrawal.  If withdrawal does not progress over the first 24-48 hours, CIWA-Ar can be administered less frequently - an interval of four to six hours is reasonable.    Factors predicting higher risk for complicated withdrawal    - A history of sustained drinking  - A history of alcohol withdrawal seizures  - A history of delirium tremens  - Age greater than 30 years  - The presence of concurrent illness  - The presence of significant alcohol withdrawal in the presence of an elevated blood alcohol concentration  - A longer period since the last drink (patients who present with alcohol withdrawal more than two days after their last drink are more likely to experience delirium tremens than those who present within two days).    Active complicated alcohol withdrawal    - Rule out and treat alternative diagnoses.  - Identify and correct metabolic derangements and volume deficits.  - Supportive care, including IV fluids, nutritional and vitamin supplementation, and frequent clinical reassessment including CIWA-Ar and vital signs, is critical.  - Determine if ICU monitoring is warranted    All patients with seizures or delirium tremens require  initial IV therapy with benzodiazepines.  Patients can be converted to PO benzodiazepines once sustained stabilization is achieved.  The benzodiazepine should be titrated to effect - ideally the goal is light sedation to a degree that ensures safety and comfort but does not obscure the neurologic examination.  Older and more medially complicated patients may benefit from heavier sedation, but would then require closer monitoring, likely in an ICU setting    Initial Dosing Schedule  Diazepam 5 to 10mg IV, repeated every 5 to 10 minutes OR  Lorazepam 2 to 4mg IV, repeated every 15 to 20 minutes    The benzodiazepine is given until the appropriate level of sedation is achieved.  In severe cases, select patients may require massive doses (>500mg of diazepam) to achieve initial control of symptoms and continued aggressive use of benzodiazepines thereafter (>2000mg diazepam over 48 hours).    Once initial control is achieved, a symptom-triggered approach is recommended utilizing the CIWA-Ar.  Evaluation intervals as frequent as every 10 to 15 minutes are appropriate for patients with more severe symptoms being treated with IV benzodiazepines.  Once severe symptoms are controlled, hourly reassessment of such patients is reasonable.  By contrast, an interval of four to six hours is reasonable for stable patients with mild symptoms receiving oral benzodiazepines.    When the score is elevated (any score of 8 or greater on the CIWA-Ar), additional medication is given.  For acute withdrawal, give diazepam 5 to 10mg IV (alternatively lorazepam 2 to 4mg IV in patients with severe liver disease).  Patients who are progressing adequately and have been converted to oral benzodiazepines can receive diazepam 10 to 20mg (alternatively lorazepam 2 to 4mg) when the CIWA-Ar is triggered.    If the patient is incapacitated and the CIWA-Ar cannot be used, instead use the Royal Agitation-Sedation Scale (RASS) with a goal of 0 to -2 on  this scale.    Refractory Delirium Tremens    Likely present if symptoms of severe withdrawal are not controlled adequately after the IV administration of more than 50mg of diazepam or 10mg of lorazepam during the first hour of treatment, or 200mg of diazepam or 40mg of lorazepam during the initial three to four hours of treatment.    Barbiturates (specifically phenobarbital) can be very effective in this population when given in conjunction with a benzodiazepine - they are thought to work synergistically.  A typical dose of phenobarbital would be 130 to 260mg IV, repeated every 15 to 20 minutes, until symptoms are controlled.    Another reasonable alternative is propofol.  A newer approach is the use of dexmedetomidine, though evidence is not as robust as for phenobarbital and propofol, so caution is advised at this time.    Tracheal intubation and mechanical ventilation are frequently necessary if phenobarbital, propofol, or dexmedetomidine are utilized.    Post-Withdrawal Treatment    Medically supervised withdrawal manages the patient through the withdrawal symptoms but does not treat alcohol use disorder.  Patients completing withdrawal are at high risk of relapse to resumed alcohol consumption.  Patients should be given explicit plans for follow-up care prior to discharge from the hospital; follow-up may involve ongoing treatment through primary care or a specialized alcohol treatment program or physician.  Continuing care, including pharmacotherapy and psychosocial intervention for alcohol use disorder, are indicated.        In cases of emergency, daily coverage provided by Acute/ER Psych MD, NP, or SW, with contact numbers located in [facility] [address] On Call Schedule    Case discussed with staff addiction psychiatrist: Liborio Patel MD           Total time:  25 with greater than 50% of this time spent in counseling and/or coordination of care.     Thank you for this consult. Addiction psychiatry will  sign off at this time. Please contact us if you have any further questions about this patient's care.      Júnior Gregorio MD   Psychiatry  [facility] - Intensive Care ([place])

## 2022-01-12 NOTE — PROGRESS NOTES
[facility] ([address] Medicine  Progress Note    Patient Name: Earl Abdul  MRN: [id number]  Patient Class: IP- Inpatient   Admission Date: 1/8/2022  Length of Stay: 4 days  Attending Physician: Christen Proctor MD  Primary Care Provider: Andrew Rodriguez MD        Subjective:     Principal Problem:Acute hypoxemic respiratory failure        HPI:      61-year-old female with COPD (per patient), GERD, hyperlipidemia, hypertension, diabetes alcohol withdrawal and depression which presents the emergency room with worsening shortness of breath over the past 3 days.  Patient states that she went to Urgent Care and tested positive for COVID and due to her low oxygen level they advised her to come to the emergency room.  Patient states that she usually wears 3 L of O2 at night but not during the day.  The patient also endorses nausea and dry heaving. In the past 3 days she has had to use oxygen 24 hours a day.  Her  tested positive 3 days ago. On interview the patient endorses a significant recent history of daily alcohol consumption with last drink being approximately 24 hours ago    Per urgent care  The current episode started 1 to 4 weeks ago. Associated symptoms include headaches, myalgias, nasal congestion, postnasal drip and shortness of breath. Pertinent negatives include no chest pain, ear congestion, ear pain, fever, sore throat, sweats, weight loss or wheezing. She has tried nothing for the symptoms. The treatment provided no relief. Her past medical history is significant for COPD.     CXR No significant change from prior.  Continued surgical clips overlying the cardiomediastinal silhouette and lower chest wall.  There is no new lung opacity allowing for differences in technique.  No large pleural effusion or pneumothorax.  Continued atherosclerotic aorta.  Clinical correlation and follow-up advised.    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 30 year smoking history. Current drinker with history of alcohol withdrawal      Overview/Hospital Course:  Anish was admitted. Started on covid treatment protocol. Began to experience sharp increase in anxiety tremulousness along with increased systolic BP to 190s. Endorsed signficant alcohol use. Treated for alcohol withdrawal with ativan PRN and Valium taper. Addiction [facility] consulted - recommendation to continue trazodone and Cymbalta while inpatient and to extend Valium taper for an additional day due to protracted withdrawal symptoms. Patient progressing towards baseline however still experiencing tremors and anxiety.      Interval History: Still requiring benzos overnight per ciwa. Patients  on phone throughout examination. Patient gave explicit consent to discuss care while he was listening.    Review of Systems   Constitutional: Positive for activity change. Negative for chills, diaphoresis and fever.   HENT: Negative for congestion and sore throat.    Eyes: Negative for discharge and itching.   Respiratory: Negative for cough, shortness of breath and wheezing.    Cardiovascular: Negative for chest pain and palpitations.   Gastrointestinal: Negative for abdominal distention, abdominal pain, nausea and vomiting.   Genitourinary: Negative for difficulty urinating and dysuria.   Musculoskeletal: Negative for arthralgias, back pain and myalgias.   Skin: Negative for color change.   Neurological: Positive for tremors. Negative for dizziness, seizures and headaches.   Psychiatric/Behavioral: Positive for agitation and sleep disturbance. Negative for behavioral problems, confusion and hallucinations. The patient is nervous/anxious and is hyperactive.      Objective:     Vital  Signs (Most Recent):  Temp: 98.2 °F (36.8 °C) (01/12/22 0453)  Pulse: 84 (01/12/22 0453)  Resp: (!) 21 (01/12/22 0453)  BP: (!) 174/83 (01/12/22 0453)  SpO2: (!) 93 % (01/12/22 0453) Vital Signs (24h Range):  Temp:  [97.8 °F (36.6 °C)-98.3 °F (36.8 °C)] 98.2 °F (36.8 °C)  Pulse:  [] 84  Resp:  [18-21] 21  SpO2:  [90 %-98 %] 93 %  BP: (148-174)/(66-91) 174/83     Weight: 88.5 kg (195 lb 1.7 oz)  Body mass index is 31.49 kg/m².    Intake/Output Summary (Last 24 hours) at 1/12/2022 0712  Last data filed at 1/11/2022 1800  Gross per 24 hour   Intake 420 ml   Output --   Net 420 ml      Physical Exam  Vitals and nursing note reviewed. Exam conducted with a chaperone present.   Constitutional:       Appearance: Normal appearance. She is obese. She is ill-appearing.   HENT:      Head: Normocephalic and atraumatic.      Right Ear: External ear normal.      Left Ear: External ear normal.      Nose: Congestion and rhinorrhea present.      Mouth/Throat:      Pharynx: Oropharynx is clear.   Eyes:      Conjunctiva/sclera: Conjunctivae normal.   Cardiovascular:      Rate and Rhythm: Regular rhythm. Tachycardia present.      Pulses: Normal pulses.      Heart sounds: Normal heart sounds.   Pulmonary:      Effort: Respiratory distress present.      Breath sounds: Wheezing present.   Chest:      Comments: Bilateral mastectomy scars with past reconstruction noted. The scars are not hypertrophic and well healed and all incisions are CDI with no signs of overlying erythema or infection   Abdominal:      General: Abdomen is flat. There is no distension.      Palpations: Abdomen is soft.      Tenderness: There is no abdominal tenderness.   Musculoskeletal:         General: No swelling, deformity or signs of injury.   Skin:     General: Skin is warm.      Findings: No erythema or rash.   Neurological:      General: No focal deficit present.      Mental Status: She is alert and oriented to person, place, and time. Mental status is at  baseline.      Comments: Tremulous   Psychiatric:         Behavior: Behavior normal.         Thought Content: Thought content normal.      Comments: Anxious         Significant Labs: All pertinent labs within the past 24 hours have been reviewed.    Significant Imaging: I have reviewed all pertinent imaging results/findings within the past 24 hours.      Assessment/Plan:      * Acute hypoxemic respiratory failure  Patient with Hypoxic Respiratory failure which is Acute on chronic.  she is on home oxygen at 3 LPM. At night Supplemental oxygen was provided and noted-  .   Signs/symptoms of respiratory failure include- increased work of breathing. Contributing diagnoses includes - COPD Labs and images were reviewed. Patient Has recent ABG, which has been reviewed. Will treat underlying causes and adjust management of respiratory failure as follows-     Improved    COVID +  No clear diagnosis of COPD  Patient wears 3L 02 but only at night, has had to wear during the day.  Wells criteria 1.5    - Will use 02 as needed for respiratory support  - Goal 02 88-92%  - Treat Covid Per protocol   - PFTs ordered- perform as outpatient  - Home spiriva ordered  - Albuterol every 6 hours      COVID-19  Patient tested positive for covid. Is at a higher 02 requirement then at baseline    Improved    -Remdesevir Dexamethasone  -Supportive care  -Daily CBC  -Home monitoring at discharge      Obesity (BMI 30.0-34.9)  Will provide weight loss counseling      Controlled type 2 diabetes mellitus without complication, without long-term current use of insulin  On admission glucose 91    - Monitor closely in the setting of dexamethasone  - On LDSS and 2 units of detemir- will adjust as needed    Hypothyroidism  Home dose levothyroxine 25 mg ordered      Anemia  Patient with hgb of 12.0     - Daily cbcs  - transfusion goal greater than 7      Chronic obstructive airway disease  See acute hypoxemic respiratory failure      Malignant neoplasm of  central portion of right breast in female, estrogen receptor positive  Per previous oncology note    Mammogram on September 4, 2020 showed a focal asymmetry in the retroareolar region of the right breast.  Ultrasound at that time showed a 9 x 5 x 8 mm hypoechoic mass at 12:00 p.m..     Biopsy on September 29, 2020 showed grade 2 infiltrating ductal carcinoma (histologic grade 3, nuclear grade 2, mitotic index 2).  Tumor was 95% ER positive 20-30% AR positive and HER2 was 2+ but negative by FISH.  Ki-67 was 80%.     On October 29, 2020 bilateral mastectomy and right axillary sentinel lymph node biopsy was performed.  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  Margins were negative with closest margin 6 mm.  The sentinel lymph node was negative.    Final pathological staging T1b N0 stage IA.     Letrozole started in February 2021.    No longer taking femara per patient         VINICIO (obstructive sleep apnea)    A PSG with Parasomnia montage was preformed on Earl Abdul on the night of 9/30/2020. The procedure was explained to the patient. All questions were asked and answered prior to the strat of the study. The patient did not meet split night criteria set by the doctor.     Little SDB events appeared to have been noted. Snoring was noted to be mild.     The EKG seemed to have shown NSR with PVC's. The lowest Spo2 noted was 84%.     Movement in arm leads noted during ZREM sleep. The patient stated she didnt remember any of her dreams. No talking or mumbling noted during the night.     Disposable equipment used where applicable. An end of the night instruction sheet was giving to the patient upon leaving the lab.    Ramírez's syndrome  See history of sarcoidosis      Hyperlipidemia  Consider statin initiation      History of sarcoidosis  She has hx of Sarcoid diagnosed about 41 years ago, she had erythema nodosum, arthralgias, mediastinal lymphadenopathy (no respiratory symptoms). She had  mediastinoscopy with biopsy that revealed diagnosis of sarcoid. She was initially treated with prednisone for about 6 months and went into remission until 7 years ago she had recurrence of arthralgias and e nodosum and was treated by primary care doctor with steroids for a couple of months. Since this time she hasn't had any flares. No hx of uveitis or other sarcoid complications.     Per EMR last seen in 2015 by Rheumatology    - Consider Rheumatology consult  - No recent flares         Depression with anxiety  Patient with significant past psychiatric history including SI SA polysubstance abuse and severe alcohol withdrawal. Will continue to monitor patient's affect closely while admitted  Per patient only taking trazadone and duloxetine      Trazadone 100mg nightly  Home dose of Duloxetine  Per pharm not taking pristique      Posterior reversible encephalopathy syndrome  History of PRES in 2017      Tobacco abuse  Will  on smoking cessation     - Provide nicotine patches as needed      Essential hypertension  Patient not on home blood pressure medications. Hypertensive to 156 while in the ED likely 2/2 alcohol withdrawal.    - Consider initiation of antihypertensives while admitted      Alcohol withdrawal  See alcohol dependence      Alcohol use disorder, severe, dependence  Will monitor for signs of withdrawal. History of alcohol withdrawal in the past. Endorses daily drinking.    Tremulous and tachycardic on exam.  Improved, will extend valium taper additional day or 2 per addiction psych    - Valium Taper per addiction psychiatry  - Follow ups as listed with addiction psychiatry  - Continue to monitor for signs of escalating withdrawal      VTE Risk Mitigation (From admission, onward)         Ordered     enoxaparin injection 90 mg  Every 12 hours         01/10/22 1430     IP VTE HIGH RISK PATIENT  Once         01/08/22 1417     Place sequential compression device  Until discontinued         01/08/22  1417                Discharge Planning   BECCA: 1/14/2022     Code Status: Full Code   Is the patient medically ready for discharge?:     Reason for patient still in hospital (select all that apply): Treatment  Discharge Plan A: Home                  Donny Lee MD  Department of Hospital Medicine   [facility] - Intensive Care ([address])

## 2022-01-13 ENCOUNTER — PATIENT MESSAGE (OUTPATIENT)
Dept: ADMINISTRATIVE | Facility: CLINIC | Age: 62
End: 2022-01-13
Payer: COMMERCIAL

## 2022-01-13 VITALS
OXYGEN SATURATION: 96 % | RESPIRATION RATE: 18 BRPM | BODY MASS INDEX: 31.36 KG/M2 | SYSTOLIC BLOOD PRESSURE: 155 MMHG | WEIGHT: 195.13 LBS | DIASTOLIC BLOOD PRESSURE: 73 MMHG | HEIGHT: 66 IN | TEMPERATURE: 98 F | HEART RATE: 72 BPM

## 2022-01-13 LAB
ANION GAP SERPL CALC-SCNC: 12 MMOL/L (ref 8–16)
BACTERIA BLD CULT: NORMAL
BACTERIA BLD CULT: NORMAL
BASOPHILS # BLD AUTO: 0.03 K/UL (ref 0–0.2)
BASOPHILS NFR BLD: 0.3 % (ref 0–1.9)
BUN SERPL-MCNC: 12 MG/DL (ref 8–23)
CALCIUM SERPL-MCNC: 9.1 MG/DL (ref 8.7–10.5)
CHLORIDE SERPL-SCNC: 102 MMOL/L (ref 95–110)
CO2 SERPL-SCNC: 22 MMOL/L (ref 23–29)
CREAT SERPL-MCNC: 0.8 MG/DL (ref 0.5–1.4)
DIFFERENTIAL METHOD: ABNORMAL
EOSINOPHIL # BLD AUTO: 0 K/UL (ref 0–0.5)
EOSINOPHIL NFR BLD: 0.2 % (ref 0–8)
ERYTHROCYTE [DISTWIDTH] IN BLOOD BY AUTOMATED COUNT: 16.1 % (ref 11.5–14.5)
EST. GFR  (AFRICAN AMERICAN): >60 ML/MIN/1.73 M^2
EST. GFR  (NON AFRICAN AMERICAN): >60 ML/MIN/1.73 M^2
GLUCOSE SERPL-MCNC: 205 MG/DL (ref 70–110)
HCT VFR BLD AUTO: 38.9 % (ref 37–48.5)
HGB BLD-MCNC: 12.1 G/DL (ref 12–16)
IMM GRANULOCYTES # BLD AUTO: 0.07 K/UL (ref 0–0.04)
IMM GRANULOCYTES NFR BLD AUTO: 0.6 % (ref 0–0.5)
LYMPHOCYTES # BLD AUTO: 4.7 K/UL (ref 1–4.8)
LYMPHOCYTES NFR BLD: 42.8 % (ref 18–48)
MAGNESIUM SERPL-MCNC: 2.1 MG/DL (ref 1.6–2.6)
MCH RBC QN AUTO: 25.9 PG (ref 27–31)
MCHC RBC AUTO-ENTMCNC: 31.1 G/DL (ref 32–36)
MCV RBC AUTO: 83 FL (ref 82–98)
MONOCYTES # BLD AUTO: 0.5 K/UL (ref 0.3–1)
MONOCYTES NFR BLD: 4.8 % (ref 4–15)
NEUTROPHILS # BLD AUTO: 5.6 K/UL (ref 1.8–7.7)
NEUTROPHILS NFR BLD: 51.3 % (ref 38–73)
NRBC BLD-RTO: 0 /100 WBC
PHOSPHATE SERPL-MCNC: 3.6 MG/DL (ref 2.7–4.5)
PLATELET # BLD AUTO: 109 K/UL (ref 150–450)
PMV BLD AUTO: 10.3 FL (ref 9.2–12.9)
POCT GLUCOSE: 106 MG/DL (ref 70–110)
POCT GLUCOSE: 125 MG/DL (ref 70–110)
POCT GLUCOSE: 195 MG/DL (ref 70–110)
POTASSIUM SERPL-SCNC: 4 MMOL/L (ref 3.5–5.1)
RBC # BLD AUTO: 4.67 M/UL (ref 4–5.4)
SODIUM SERPL-SCNC: 136 MMOL/L (ref 136–145)
WBC # BLD AUTO: 10.9 K/UL (ref 3.9–12.7)

## 2022-01-13 PROCEDURE — 85025 COMPLETE CBC W/AUTO DIFF WBC: CPT | Performed by: STUDENT IN AN ORGANIZED HEALTH CARE EDUCATION/TRAINING PROGRAM

## 2022-01-13 PROCEDURE — 83735 ASSAY OF MAGNESIUM: CPT | Performed by: STUDENT IN AN ORGANIZED HEALTH CARE EDUCATION/TRAINING PROGRAM

## 2022-01-13 PROCEDURE — 84100 ASSAY OF PHOSPHORUS: CPT | Performed by: STUDENT IN AN ORGANIZED HEALTH CARE EDUCATION/TRAINING PROGRAM

## 2022-01-13 PROCEDURE — 99900035 HC TECH TIME PER 15 MIN (STAT)

## 2022-01-13 PROCEDURE — 25000003 PHARM REV CODE 250: Performed by: STUDENT IN AN ORGANIZED HEALTH CARE EDUCATION/TRAINING PROGRAM

## 2022-01-13 PROCEDURE — 94640 AIRWAY INHALATION TREATMENT: CPT

## 2022-01-13 PROCEDURE — 80048 BASIC METABOLIC PNL TOTAL CA: CPT | Performed by: STUDENT IN AN ORGANIZED HEALTH CARE EDUCATION/TRAINING PROGRAM

## 2022-01-13 PROCEDURE — 94761 N-INVAS EAR/PLS OXIMETRY MLT: CPT

## 2022-01-13 PROCEDURE — 63600175 PHARM REV CODE 636 W HCPCS: Performed by: STUDENT IN AN ORGANIZED HEALTH CARE EDUCATION/TRAINING PROGRAM

## 2022-01-13 PROCEDURE — 99232 SBSQ HOSP IP/OBS MODERATE 35: CPT | Mod: ,,, | Performed by: STUDENT IN AN ORGANIZED HEALTH CARE EDUCATION/TRAINING PROGRAM

## 2022-01-13 PROCEDURE — 25000003 PHARM REV CODE 250: Performed by: INTERNAL MEDICINE

## 2022-01-13 PROCEDURE — 36415 COLL VENOUS BLD VENIPUNCTURE: CPT | Performed by: STUDENT IN AN ORGANIZED HEALTH CARE EDUCATION/TRAINING PROGRAM

## 2022-01-13 PROCEDURE — 99232 PR SUBSEQUENT HOSPITAL CARE,LEVL II: ICD-10-PCS | Mod: ,,, | Performed by: STUDENT IN AN ORGANIZED HEALTH CARE EDUCATION/TRAINING PROGRAM

## 2022-01-13 RX ORDER — DIAZEPAM 5 MG/1
5 TABLET ORAL EVERY 12 HOURS
Qty: 2 TABLET | Refills: 0 | Status: SHIPPED | OUTPATIENT
Start: 2022-01-13 | End: 2022-03-10

## 2022-01-13 RX ADMIN — FLUTICASONE FUROATE AND VILANTEROL TRIFENATATE 1 PUFF: 100; 25 POWDER RESPIRATORY (INHALATION) at 08:01

## 2022-01-13 RX ADMIN — MUPIROCIN: 20 OINTMENT TOPICAL at 08:01

## 2022-01-13 RX ADMIN — ALBUTEROL SULFATE 2 PUFF: 108 INHALANT RESPIRATORY (INHALATION) at 08:01

## 2022-01-13 RX ADMIN — DEXAMETHASONE 6 MG: 4 TABLET ORAL at 08:01

## 2022-01-13 RX ADMIN — ALBUTEROL SULFATE 2 PUFF: 108 INHALANT RESPIRATORY (INHALATION) at 01:01

## 2022-01-13 RX ADMIN — LEVOTHYROXINE SODIUM 25 MCG: 0.03 TABLET ORAL at 05:01

## 2022-01-13 RX ADMIN — DIAZEPAM 5 MG: 5 TABLET ORAL at 02:01

## 2022-01-13 RX ADMIN — ALBUTEROL SULFATE 2 PUFF: 108 INHALANT RESPIRATORY (INHALATION) at 02:01

## 2022-01-13 RX ADMIN — MULTIPLE VITAMINS W/ MINERALS TAB 1 TABLET: TAB at 08:01

## 2022-01-13 RX ADMIN — OXYCODONE HYDROCHLORIDE AND ACETAMINOPHEN 500 MG: 500 TABLET ORAL at 08:01

## 2022-01-13 RX ADMIN — PANTOPRAZOLE SODIUM 40 MG: 40 TABLET, DELAYED RELEASE ORAL at 08:01

## 2022-01-13 RX ADMIN — DIAZEPAM 5 MG: 5 TABLET ORAL at 05:01

## 2022-01-13 RX ADMIN — ENOXAPARIN SODIUM 90 MG: 100 INJECTION, SOLUTION INTRAVENOUS; SUBCUTANEOUS at 08:01

## 2022-01-13 RX ADMIN — FOLIC ACID 1 MG: 1 TABLET ORAL at 08:01

## 2022-01-13 RX ADMIN — DULOXETINE 30 MG: 30 CAPSULE, DELAYED RELEASE ORAL at 08:01

## 2022-01-13 NOTE — ASSESSMENT & PLAN NOTE
Patient with significant past psychiatric history including SI SA polysubstance abuse and severe alcohol withdrawal. Will continue to monitor patient's affect closely while admitted  Per patient only taking trazadone and duloxetine      Trazadone 100mg nightly  Duloxetine dose increased  Per pharm not taking pristique

## 2022-01-13 NOTE — ASSESSMENT & PLAN NOTE
Per previous oncology note    Mammogram on September 4, 2020 showed a focal asymmetry in the retroareolar region of the right breast.  Ultrasound at that time showed a 9 x 5 x 8 mm hypoechoic mass at 12:00 p.m..     Biopsy on September 29, 2020 showed grade 2 infiltrating ductal carcinoma (histologic grade 3, nuclear grade 2, mitotic index 2).  Tumor was 95% ER positive 20-30% ME positive and HER2 was 2+ but negative by FISH.  Ki-67 was 80%.     On October 29, 2020 bilateral mastectomy and right axillary sentinel lymph node biopsy was performed.  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  Margins were negative with closest margin 6 mm.  The sentinel lymph node was negative.    Final pathological staging T1b N0 stage IA.     Letrozole started in February 2021.    No longer taking femara per patient

## 2022-01-13 NOTE — DISCHARGE SUMMARY
[facility] ([address] Medicine  Discharge Summary      Patient Name: Earl Abdul  MRN: [id number]  Patient Class: IP- Inpatient  Admission Date: 1/8/2022  Hospital Length of Stay: 5 days  Discharge Date and Time:  01/13/2022 3:30 PM  Attending Physician: Carloz Canales MD   Discharging Provider: Donny Lee MD  Primary Care Provider: nAdrew Rodriguez MD      HPI:       61-year-old female with COPD (per patient), GERD, hyperlipidemia, hypertension, diabetes alcohol withdrawal and depression which presents the emergency room with worsening shortness of breath over the past 3 days.  Patient states that she went to Urgent Care and tested positive for COVID and due to her low oxygen level they advised her to come to the emergency room.  Patient states that she usually wears 3 L of O2 at night but not during the day.  The patient also endorses nausea and dry heaving. In the past 3 days she has had to use oxygen 24 hours a day.  Her  tested positive 3 days ago. On interview the patient endorses a significant recent history of daily alcohol consumption with last drink being approximately 24 hours ago    Per urgent care  The current episode started 1 to 4 weeks ago. Associated symptoms include headaches, myalgias, nasal congestion, postnasal drip and shortness of breath. Pertinent negatives include no chest pain, ear congestion, ear pain, fever, sore throat, sweats, weight loss or wheezing. She has tried nothing for the symptoms. The treatment provided no relief. Her past medical history is significant for COPD.     CXR No significant change from prior.  Continued surgical clips overlying the cardiomediastinal silhouette and lower chest wall.  There is no new lung opacity allowing for differences in technique.  No large pleural effusion or pneumothorax.  Continued atherosclerotic aorta.  Clinical correlation and follow-up advised.    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 30 year smoking history. Current drinker with history of alcohol withdrawal      * No surgery found *      Hospital Course:   Anish was admitted. Started on covid treatment protocol. Began to experience sharp increase in anxiety tremulousness along with increased systolic BP to 190s. Endorsed signficant alcohol use. Treated for alcohol withdrawal with ativan PRN and Valium taper. [facility] to continue trazodone and Cymbalta while inpatient and to extend Valium taper for an additional day due to protracted withdrawal symptoms. Patient progressing towards baseline however still experiencing tremors and anxiety.Patient with signicant improvement to clinical status on 1/13. Ammennable to returning home. Ambulatory and not on any oxygen. At this point the patient was medically cleared for follow up with the medications and follow up as listed below       Goals of Care Treatment Preferences:  Code Status: Full Code      Consults:   Consults (From admission, onward)        Status Ordering Provider     Inpatient consult to Psychiatry  Once        Provider:  (Not yet assigned)    Completed FIDELIA SOTO          * Acute hypoxemic respiratory failure  Patient with Hypoxic Respiratory failure which is Acute on chronic.  she is on home oxygen at 3 LPM. At night Supplemental oxygen was provided and noted-  .   Signs/symptoms of respiratory failure include- increased work of breathing. Contributing diagnoses includes - COPD Labs and images were reviewed. Patient Has recent ABG, which has been reviewed. Will treat underlying causes and adjust management of respiratory failure as follows-     Improved    COVID +  No clear diagnosis of COPD  Patient  wears 3L 02 but only at night, has had to wear during the day.  Wells criteria 1.5    - Will use 02 as needed for respiratory support  - Goal 02 88-92%  - Treat Covid Per protocol   - PFTs ordered- perform as outpatient  - Home spiriva ordered  - Albuterol every 6 hours      COVID-19  Patient tested positive for covid. Is at a higher 02 requirement then at baseline    Improved    -Remdesevir Dexamethasone  -Supportive care  -Daily CBC  -Home monitoring at discharge      Obesity (BMI 30.0-34.9)  Will provide weight loss counseling      Controlled type 2 diabetes mellitus without complication, without long-term current use of insulin  On admission glucose 91    - Monitor closely in the setting of dexamethasone  - On LDSS and 2 units of detemir- will adjust as needed    Hypothyroidism  Home dose levothyroxine 25 mg ordered      Anemia  Patient with hgb of 12.0     - Daily cbcs  - transfusion goal greater than 7      Chronic obstructive airway disease  See acute hypoxemic respiratory failure      Malignant neoplasm of central portion of right breast in female, estrogen receptor positive  Per previous oncology note    Mammogram on September 4, 2020 showed a focal asymmetry in the retroareolar region of the right breast.  Ultrasound at that time showed a 9 x 5 x 8 mm hypoechoic mass at 12:00 p.m..     Biopsy on September 29, 2020 showed grade 2 infiltrating ductal carcinoma (histologic grade 3, nuclear grade 2, mitotic index 2).  Tumor was 95% ER positive 20-30% TN positive and HER2 was 2+ but negative by FISH.  Ki-67 was 80%.     On October 29, 2020 bilateral mastectomy and right axillary sentinel lymph node biopsy was performed.  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  Margins were negative with closest margin 6 mm.  The sentinel lymph node was negative.    Final pathological staging T1b N0 stage IA.     Letrozole started in February 2021.    No longer taking femara per patient         VINICIO  (obstructive sleep apnea)    A PSG with Parasomnia montage was preformed on Earl Abdul on the night of 9/30/2020. The procedure was explained to the patient. All questions were asked and answered prior to the strat of the study. The patient did not meet split night criteria set by the doctor.     Little SDB events appeared to have been noted. Snoring was noted to be mild.     The EKG seemed to have shown NSR with PVC's. The lowest Spo2 noted was 84%.     Movement in arm leads noted during ZREM sleep. The patient stated she didnt remember any of her dreams. No talking or mumbling noted during the night.     Disposable equipment used where applicable. An end of the night instruction sheet was giving to the patient upon leaving the lab.    Ramírez's syndrome  See history of sarcoidosis      Hyperlipidemia  Consider statin initiation      History of sarcoidosis  She has hx of Sarcoid diagnosed about 41 years ago, she had erythema nodosum, arthralgias, mediastinal lymphadenopathy (no respiratory symptoms). She had mediastinoscopy with biopsy that revealed diagnosis of sarcoid. She was initially treated with prednisone for about 6 months and went into remission until 7 years ago she had recurrence of arthralgias and e nodosum and was treated by primary care doctor with steroids for a couple of months. Since this time she hasn't had any flares. No hx of uveitis or other sarcoid complications.     Per EMR last seen in 2015 by Rheumatology    - Consider Rheumatology consult  - No recent flares         Depression with anxiety  Patient with significant past psychiatric history including SI SA polysubstance abuse and severe alcohol withdrawal. Will continue to monitor patient's affect closely while admitted  Per patient only taking trazadone and duloxetine      Trazadone 100mg nightly  Duloxetine dose increased  Per pharm not taking pristique      Posterior reversible encephalopathy syndrome  History of PRES in  2017      Tobacco abuse  Will  on smoking cessation     - Provide nicotine patches as needed      Essential hypertension  Patient not on home blood pressure medications. Hypertensive to 156 while in the ED likely 2/2 alcohol withdrawal.    - Consider initiation of antihypertensives while admitted      Alcohol withdrawal  See alcohol dependence      Alcohol use disorder, severe, dependence  Will monitor for signs of withdrawal. History of alcohol withdrawal in the past. Endorses daily drinking.    Tremulous and tachycardic on exam.  Valium taper to 1/14    - Valium Taper per addiction psychiatry  - Follow ups as listed with addiction psychiatry  - Continue to monitor for signs of escalating withdrawal      Final Active Diagnoses:    Diagnosis Date Noted POA    PRINCIPAL PROBLEM:  Acute hypoxemic respiratory failure [J96.01] 01/08/2022 Unknown    COVID-19 [U07.1] 01/08/2022 Unknown    Hypothyroidism [E03.9] 11/30/2021 Yes    Controlled type 2 diabetes mellitus without complication, without long-term current use of insulin [E11.9] 11/30/2021 Yes    Obesity (BMI 30.0-34.9) [E66.9] 11/30/2021 Yes    Anemia [D64.9] 11/30/2021 Yes    Chronic obstructive airway disease [J44.9] 04/17/2021 Unknown    Malignant neoplasm of central portion of right breast in female, estrogen receptor positive [C50.111, Z17.0] 11/18/2020 Not Applicable    VINICIO (obstructive sleep apnea) [G47.33]  Yes    Depression with anxiety [F41.8] 08/16/2017 Yes    History of sarcoidosis [Z86.2] 07/26/2017 Yes     Chronic    Posterior reversible encephalopathy syndrome [I67.83] 07/22/2017 Yes    Alcohol use disorder, severe, dependence [F10.20] 02/07/2014 Yes     Chronic    Alcohol withdrawal [F10.239] 02/07/2014 Yes    Tobacco abuse [Z72.0] 02/07/2014 Yes    Essential hypertension [I10] 02/07/2014 Yes    Ramírez's syndrome [D86.9] 11/19/2013 Yes    Hyperlipidemia [E78.5] 11/30/2012 Yes     Chronic      Problems Resolved During this  Admission:       Discharged Condition: good    Disposition:     Follow Up:   Follow-up Information     Andrew Rodriguez MD On 1/25/2022.    Specialty: Internal Medicine  Why: at 10:00 SM; PCP hospital follow up appointment, address hand tremor and potential neuro consult for more  evaluation once out of withdrawals  Contact information:  [address]  [address]  [facility] [address]  [phone]                       Patient Instructions:      Ambulatory referral/consult to Addiction Psychiatry   Standing Status: Future   Referral Priority: Routine Referral Type: Psychiatric   Referral Reason: Specialty Services Required   Requested Specialty: Addiction Medicine   Number of Visits Requested: 1     Ambulatory referral/consult to Internal Medicine   Standing Status: Future   Referral Priority: Routine Referral Type: Consultation   Referral Reason: Specialty Services Required   Requested Specialty: Internal Medicine   Number of Visits Requested: 1     COVID-19 Surveillance Program     Order Specific Question Answer Comments   Does patient have a smartphone? Yes    Does patient have the [facility] flex on their smartphone? Yes    While in surveillance program, will patient be using home oxygen? No      COVID-19 Home Symptom Monitoring  - Duration (days): 14     Order Specific Question Answer Comments   Duration (days) 14        Significant Diagnostic Studies: Labs:   CBC   Recent Labs   Lab 01/12/22  0926 01/12/22  0926 01/13/22  1113   WBC 8.99  --  10.90   HGB 12.5  --  12.1   HCT 39.8   < > 38.9   *  --  109*    < > = values in this interval not displayed.       Pending Diagnostic Studies:     None         Medications:  Reconciled Home Medications:      Medication List      START taking these medications    diazePAM 5 MG tablet  Commonly known as: VALIUM  Take 1 tablet (5 mg total) by mouth every 12 (twelve) hours. for 2 doses     pulse oximeter device  Commonly known as: pulse oximeter  by Apply  Externally route 2 (two) times a day. Use twice daily at 8 AM and 3 PM and record the value in MyChart as directed.        CONTINUE taking these medications    albuterol 90 mcg/actuation inhaler  Commonly known as: VENTOLIN HFA  Inhale 2 puffs into the lungs every 6 (six) hours as needed for Wheezing. Rescue     ARIPiprazole 5 MG Tab  Commonly known as: ABILIFY  Take 1 tablet (5 mg total) by mouth 2 (two) times daily.     DULoxetine 30 MG capsule  Commonly known as: CYMBALTA  Take 1 capsule (30 mg total) by mouth once daily.     FLUoxetine 60 mg Tab  Take 60 mg by mouth once daily.     folic acid 1 MG tablet  Commonly known as: FOLVITE  Take 1 tablet (1 mg total) by mouth once daily.     gabapentin 400 MG capsule  Commonly known as: NEURONTIN  Take 1 capsule (400 mg total) by mouth 3 (three) times daily.     hydrOXYzine pamoate 50 MG Cap  Commonly known as: VISTARIL     letrozole 2.5 mg Tab  Commonly known as: FEMARA     levalbuterol 0.63 mg/3 mL nebulizer solution  Commonly known as: XOPENEX     levothyroxine 25 MCG tablet  Commonly known as: SYNTHROID  Take 1 tablet (25 mcg total) by mouth before breakfast.     ondansetron 8 MG Tbdl  Commonly known as: ZOFRAN ODT  Take 1 tablet (8 mg total) by mouth 3 (three) times daily as needed (Nausea).     pantoprazole 40 MG tablet  Commonly known as: PROTONIX  Take 1 tablet (40 mg total) by mouth once daily.     SPIRIVA WITH HANDIHALER 18 mcg inhalation capsule  Generic drug: tiotropium  1 capsule every morning.     thiamine mononitrate (vit B1) 100 mg Tab  Commonly known as: VITAMIN B-1 (MONONITRATE)     tiZANidine 4 MG tablet  Commonly known as: ZANAFLEX  Take 1 tablet (4 mg total) by mouth every 8 (eight) hours.     traZODone 100 MG tablet  Commonly known as: DESYREL  Take 1 tablet (100 mg total) by mouth nightly as needed for Insomnia.     TRULICITY 0.75 mg/0.5 mL pen injector  Generic drug: dulaglutide  Inject 0.75 mg into the skin every 7 days. After 1 month if  tolerating will increase to full dose            Indwelling Lines/Drains at time of discharge:   Lines/Drains/Airways     None                 Time spent on the discharge of patient: 30 minutes         Donny Lee MD  Department of Hospital Medicine  [facility] - Intensive Care ([place]-[facility])

## 2022-01-13 NOTE — PLAN OF CARE
Problem: Adult Inpatient Plan of Care  Goal: Plan of Care Review  Outcome: Ongoing, Progressing  Goal: Optimal Comfort and Wellbeing  Outcome: Ongoing, Progressing  Goal: Readiness for Transition of Care  Outcome: Ongoing, Progressing     Problem: Diabetes Comorbidity  Goal: Blood Glucose Level Within Targeted Range  Outcome: Ongoing, Progressing     Problem: Fall Injury Risk  Goal: Absence of Fall and Fall-Related Injury  Outcome: Ongoing, Progressing     Problem: Alcohol Withdrawal  Goal: Alcohol Withdrawal Symptom Control  Outcome: Ongoing, Progressing

## 2022-01-13 NOTE — ASSESSMENT & PLAN NOTE
Per previous oncology note    Mammogram on September 4, 2020 showed a focal asymmetry in the retroareolar region of the right breast.  Ultrasound at that time showed a 9 x 5 x 8 mm hypoechoic mass at 12:00 p.m..     Biopsy on September 29, 2020 showed grade 2 infiltrating ductal carcinoma (histologic grade 3, nuclear grade 2, mitotic index 2).  Tumor was 95% ER positive 20-30% MI positive and HER2 was 2+ but negative by FISH.  Ki-67 was 80%.     On October 29, 2020 bilateral mastectomy and right axillary sentinel lymph node biopsy was performed.  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  Margins were negative with closest margin 6 mm.  The sentinel lymph node was negative.    Final pathological staging T1b N0 stage IA.     Letrozole started in February 2021.    No longer taking femara per patient

## 2022-01-13 NOTE — DISCHARGE INSTRUCTIONS
If you becoming short of breath while walking/exerting yourself at home, check your oxygen saturation (SpO2) on the pulse oximeter and use 1-2L oxygen through NC as needed while walking/ambulating. No need for oxygen use at rest. Follow up with your PCP in 1-2 weeks

## 2022-01-13 NOTE — PLAN OF CARE
"Patient resting quietly in bed when CM rounded. No family present. Patient in agreement with plan to discharge home today, denied the need for assistance with transportation at time of discharge, & verbalized understanding regarding the hospital follow up appointment scheduled with Dr. Andrew Rodriguez (PCP) on 1/25/2022 at 1000.     Printed "Addiction Disorder Resources" packed given to patient. CM encouraged the patient call to get enrolled in either an in-pt or our-pt program. Patient verbalized understanding.       Will continue to follow.     "

## 2022-01-13 NOTE — SUBJECTIVE & OBJECTIVE
Interval History: Improving anxiety and tremulous ness. No acute events overnight. Vital signs have remained stable with some episodes of hypertension likely secondary to withdrawal.    Review of Systems   Constitutional: Positive for activity change. Negative for chills, diaphoresis and fever.   HENT: Negative for congestion and sore throat.    Eyes: Negative for discharge and itching.   Respiratory: Negative for cough, shortness of breath and wheezing.    Cardiovascular: Negative for chest pain and palpitations.   Gastrointestinal: Negative for abdominal distention, abdominal pain, nausea and vomiting.   Genitourinary: Negative for difficulty urinating and dysuria.   Musculoskeletal: Negative for arthralgias, back pain and myalgias.   Skin: Negative for color change.   Neurological: Positive for tremors. Negative for dizziness, seizures and headaches.   Psychiatric/Behavioral: Positive for agitation and sleep disturbance. Negative for behavioral problems, confusion and hallucinations. The patient is nervous/anxious and is hyperactive.      Objective:     Vital Signs (Most Recent):  Temp: 98.7 °F (37.1 °C) (01/13/22 0356)  Pulse: 70 (01/13/22 0407)  Resp: 16 (01/13/22 0356)  BP: (!) 170/80 (01/13/22 0407)  SpO2: 96 % (01/13/22 0407) Vital Signs (24h Range):  Temp:  [97.9 °F (36.6 °C)-99 °F (37.2 °C)] 98.7 °F (37.1 °C)  Pulse:  [] 70  Resp:  [15-22] 16  SpO2:  [90 %-96 %] 96 %  BP: (149-184)/(70-87) 170/80     Weight: 88.5 kg (195 lb 1.7 oz)  Body mass index is 31.49 kg/m².  No intake or output data in the 24 hours ending 01/13/22 0710   Physical Exam  Vitals and nursing note reviewed. Exam conducted with a chaperone present.   Constitutional:       Appearance: Normal appearance. She is obese. She is ill-appearing.   HENT:      Head: Normocephalic and atraumatic.      Right Ear: External ear normal.      Left Ear: External ear normal.      Nose: Congestion and rhinorrhea present.      Mouth/Throat:       Pharynx: Oropharynx is clear.   Eyes:      Conjunctiva/sclera: Conjunctivae normal.   Cardiovascular:      Rate and Rhythm: Regular rhythm. Tachycardia present.      Pulses: Normal pulses.      Heart sounds: Normal heart sounds.   Pulmonary:      Effort: Respiratory distress present.      Breath sounds: Wheezing present.   Chest:      Comments: Bilateral mastectomy scars with past reconstruction noted. The scars are not hypertrophic and well healed and all incisions are CDI with no signs of overlying erythema or infection   Abdominal:      General: Abdomen is flat. There is no distension.      Palpations: Abdomen is soft.      Tenderness: There is no abdominal tenderness.   Musculoskeletal:         General: No swelling, deformity or signs of injury.   Skin:     General: Skin is warm.      Findings: No erythema or rash.   Neurological:      General: No focal deficit present.      Mental Status: She is alert and oriented to person, place, and time. Mental status is at baseline.      Comments: Tremulous   Psychiatric:         Behavior: Behavior normal.         Thought Content: Thought content normal.      Comments: Anxious         Significant Labs: All pertinent labs within the past 24 hours have been reviewed.    Significant Imaging: I have reviewed all pertinent imaging results/findings within the past 24 hours.

## 2022-01-13 NOTE — PLAN OF CARE
Patient is being d/c today with no Social Service needs identified at this time.      01/13/22 1601   Post-Acute Status   Post-Acute Authorization Other   Other Status No Post-Acute Service Needs     Stefanie Rogers LMSW   - [facility]  Ext. [phone]

## 2022-01-13 NOTE — PROGRESS NOTES
[facility] ([address] Medicine  Progress Note    Patient Name: Earl Abdul  MRN: [id number]  Patient Class: IP- Inpatient   Admission Date: 1/8/2022  Length of Stay: 5 days  Attending Physician: Carloz Canales MD  Primary Care Provider: Andrew Rodriguez MD        Subjective:     Principal Problem:Acute hypoxemic respiratory failure        HPI:      61-year-old female with COPD (per patient), GERD, hyperlipidemia, hypertension, diabetes alcohol withdrawal and depression which presents the emergency room with worsening shortness of breath over the past 3 days.  Patient states that she went to Urgent Care and tested positive for COVID and due to her low oxygen level they advised her to come to the emergency room.  Patient states that she usually wears 3 L of O2 at night but not during the day.  The patient also endorses nausea and dry heaving. In the past 3 days she has had to use oxygen 24 hours a day.  Her  tested positive 3 days ago. On interview the patient endorses a significant recent history of daily alcohol consumption with last drink being approximately 24 hours ago    Per urgent care  The current episode started 1 to 4 weeks ago. Associated symptoms include headaches, myalgias, nasal congestion, postnasal drip and shortness of breath. Pertinent negatives include no chest pain, ear congestion, ear pain, fever, sore throat, sweats, weight loss or wheezing. She has tried nothing for the symptoms. The treatment provided no relief. Her past medical history is significant for COPD.     CXR No significant change from prior.  Continued surgical clips overlying the cardiomediastinal silhouette and lower chest wall.  There is no new lung opacity allowing for differences in technique.  No large pleural effusion or pneumothorax.  Continued atherosclerotic aorta.  Clinical correlation and follow-up advised.    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 30 year smoking history. Current drinker with history of alcohol withdrawal      Overview/Hospital Course:  Anish was admitted. Started on covid treatment protocol. Began to experience sharp increase in anxiety tremulousness along with increased systolic BP to 190s. Endorsed signficant alcohol use. Treated for alcohol withdrawal with ativan PRN and Valium taper. Addiction [facility] consulted - recommendation to continue trazodone and Cymbalta while inpatient and to extend Valium taper for an additional day due to protracted withdrawal symptoms. Patient progressing towards baseline however still experiencing tremors and anxiety.      Interval History: Improving anxiety and tremulous ness. No acute events overnight. Vital signs have remained stable with some episodes of hypertension likely secondary to withdrawal.    Review of Systems   Constitutional: Positive for activity change. Negative for chills, diaphoresis and fever.   HENT: Negative for congestion and sore throat.    Eyes: Negative for discharge and itching.   Respiratory: Negative for cough, shortness of breath and wheezing.    Cardiovascular: Negative for chest pain and palpitations.   Gastrointestinal: Negative for abdominal distention, abdominal pain, nausea and vomiting.   Genitourinary: Negative for difficulty urinating and dysuria.   Musculoskeletal: Negative for arthralgias, back pain and myalgias.   Skin: Negative for color change.   Neurological: Positive for tremors. Negative for dizziness, seizures and headaches.   Psychiatric/Behavioral: Positive for agitation and sleep disturbance. Negative for behavioral problems, confusion and hallucinations. The patient is nervous/anxious and is hyperactive.      Objective:     Vital  Signs (Most Recent):  Temp: 98.7 °F (37.1 °C) (01/13/22 0356)  Pulse: 70 (01/13/22 0407)  Resp: 16 (01/13/22 0356)  BP: (!) 170/80 (01/13/22 0407)  SpO2: 96 % (01/13/22 0407) Vital Signs (24h Range):  Temp:  [97.9 °F (36.6 °C)-99 °F (37.2 °C)] 98.7 °F (37.1 °C)  Pulse:  [] 70  Resp:  [15-22] 16  SpO2:  [90 %-96 %] 96 %  BP: (149-184)/(70-87) 170/80     Weight: 88.5 kg (195 lb 1.7 oz)  Body mass index is 31.49 kg/m².  No intake or output data in the 24 hours ending 01/13/22 0710   Physical Exam  Vitals and nursing note reviewed. Exam conducted with a chaperone present.   Constitutional:       Appearance: Normal appearance. She is obese. She is ill-appearing.   HENT:      Head: Normocephalic and atraumatic.      Right Ear: External ear normal.      Left Ear: External ear normal.      Nose: Congestion and rhinorrhea present.      Mouth/Throat:      Pharynx: Oropharynx is clear.   Eyes:      Conjunctiva/sclera: Conjunctivae normal.   Cardiovascular:      Rate and Rhythm: Regular rhythm. Tachycardia present.      Pulses: Normal pulses.      Heart sounds: Normal heart sounds.   Pulmonary:      Effort: Respiratory distress present.      Breath sounds: Wheezing present.   Chest:      Comments: Bilateral mastectomy scars with past reconstruction noted. The scars are not hypertrophic and well healed and all incisions are CDI with no signs of overlying erythema or infection   Abdominal:      General: Abdomen is flat. There is no distension.      Palpations: Abdomen is soft.      Tenderness: There is no abdominal tenderness.   Musculoskeletal:         General: No swelling, deformity or signs of injury.   Skin:     General: Skin is warm.      Findings: No erythema or rash.   Neurological:      General: No focal deficit present.      Mental Status: She is alert and oriented to person, place, and time. Mental status is at baseline.      Comments: Tremulous   Psychiatric:         Behavior: Behavior normal.         Thought  Content: Thought content normal.      Comments: Anxious         Significant Labs: All pertinent labs within the past 24 hours have been reviewed.    Significant Imaging: I have reviewed all pertinent imaging results/findings within the past 24 hours.      Assessment/Plan:      * Acute hypoxemic respiratory failure  Patient with Hypoxic Respiratory failure which is Acute on chronic.  she is on home oxygen at 3 LPM. At night Supplemental oxygen was provided and noted-  .   Signs/symptoms of respiratory failure include- increased work of breathing. Contributing diagnoses includes - COPD Labs and images were reviewed. Patient Has recent ABG, which has been reviewed. Will treat underlying causes and adjust management of respiratory failure as follows-     Improved    COVID +  No clear diagnosis of COPD  Patient wears 3L 02 but only at night, has had to wear during the day.  Wells criteria 1.5    - Will use 02 as needed for respiratory support  - Goal 02 88-92%  - Treat Covid Per protocol   - PFTs ordered- perform as outpatient  - Home spiriva ordered  - Albuterol every 6 hours      COVID-19  Patient tested positive for covid. Is at a higher 02 requirement then at baseline    Improved    -Remdesevir Dexamethasone  -Supportive care  -Daily CBC  -Home monitoring at discharge      Obesity (BMI 30.0-34.9)  Will provide weight loss counseling      Controlled type 2 diabetes mellitus without complication, without long-term current use of insulin  On admission glucose 91    - Monitor closely in the setting of dexamethasone  - On LDSS and 2 units of detemir- will adjust as needed    Hypothyroidism  Home dose levothyroxine 25 mg ordered      Anemia  Patient with hgb of 12.0     - Daily cbcs  - transfusion goal greater than 7      Chronic obstructive airway disease  See acute hypoxemic respiratory failure      Malignant neoplasm of central portion of right breast in female, estrogen receptor positive  Per previous oncology  note    Mammogram on September 4, 2020 showed a focal asymmetry in the retroareolar region of the right breast.  Ultrasound at that time showed a 9 x 5 x 8 mm hypoechoic mass at 12:00 p.m..     Biopsy on September 29, 2020 showed grade 2 infiltrating ductal carcinoma (histologic grade 3, nuclear grade 2, mitotic index 2).  Tumor was 95% ER positive 20-30% MA positive and HER2 was 2+ but negative by FISH.  Ki-67 was 80%.     On October 29, 2020 bilateral mastectomy and right axillary sentinel lymph node biopsy was performed.  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  Margins were negative with closest margin 6 mm.  The sentinel lymph node was negative.    Final pathological staging T1b N0 stage IA.     Letrozole started in February 2021.    No longer taking femara per patient         VINICIO (obstructive sleep apnea)    A PSG with Parasomnia montage was preformed on Earl Abdul on the night of 9/30/2020. The procedure was explained to the patient. All questions were asked and answered prior to the strat of the study. The patient did not meet split night criteria set by the doctor.     Little SDB events appeared to have been noted. Snoring was noted to be mild.     The EKG seemed to have shown NSR with PVC's. The lowest Spo2 noted was 84%.     Movement in arm leads noted during ZREM sleep. The patient stated she didnt remember any of her dreams. No talking or mumbling noted during the night.     Disposable equipment used where applicable. An end of the night instruction sheet was giving to the patient upon leaving the lab.    Ramírez's syndrome  See history of sarcoidosis      Hyperlipidemia  Consider statin initiation      History of sarcoidosis  She has hx of Sarcoid diagnosed about 41 years ago, she had erythema nodosum, arthralgias, mediastinal lymphadenopathy (no respiratory symptoms). She had mediastinoscopy with biopsy that revealed diagnosis of sarcoid. She was initially treated with  prednisone for about 6 months and went into remission until 7 years ago she had recurrence of arthralgias and e nodosum and was treated by primary care doctor with steroids for a couple of months. Since this time she hasn't had any flares. No hx of uveitis or other sarcoid complications.     Per EMR last seen in 2015 by Rheumatology    - Consider Rheumatology consult  - No recent flares         Depression with anxiety  Patient with significant past psychiatric history including SI SA polysubstance abuse and severe alcohol withdrawal. Will continue to monitor patient's affect closely while admitted  Per patient only taking trazadone and duloxetine      Trazadone 100mg nightly  Duloxetine dose increased  Per pharm not taking pristique      Posterior reversible encephalopathy syndrome  History of PRES in 2017      Tobacco abuse  Will  on smoking cessation     - Provide nicotine patches as needed      Essential hypertension  Patient not on home blood pressure medications. Hypertensive to 156 while in the ED likely 2/2 alcohol withdrawal.    - Consider initiation of antihypertensives while admitted      Alcohol withdrawal  See alcohol dependence      Alcohol use disorder, severe, dependence  Will monitor for signs of withdrawal. History of alcohol withdrawal in the past. Endorses daily drinking.    Tremulous and tachycardic on exam.  Valium taper to 1/14    - Valium Taper per addiction psychiatry  - Follow ups as listed with addiction psychiatry  - Continue to monitor for signs of escalating withdrawal      VTE Risk Mitigation (From admission, onward)         Ordered     enoxaparin injection 90 mg  Every 12 hours         01/10/22 1430     IP VTE HIGH RISK PATIENT  Once         01/08/22 1417     Place sequential compression device  Until discontinued         01/08/22 1417                Discharge Planning   BECCA: 1/14/2022     Code Status: Full Code   Is the patient medically ready for discharge?:     Reason for  patient still in hospital (select all that apply): Treatment  Discharge Plan A: Home                  Donny Lee MD  Department of Hospital Medicine   [facility] - Intensive Care ([place]

## 2022-01-13 NOTE — ASSESSMENT & PLAN NOTE
Will monitor for signs of withdrawal. History of alcohol withdrawal in the past. Endorses daily drinking.    Tremulous and tachycardic on exam.  Valium taper to 1/14    - Valium Taper per addiction psychiatry  - Follow ups as listed with addiction psychiatry  - Continue to monitor for signs of escalating withdrawal

## 2022-01-14 ENCOUNTER — DOCUMENTATION ONLY (OUTPATIENT)
Dept: HEPATOLOGY | Facility: HOSPITAL | Age: 62
End: 2022-01-14
Payer: COMMERCIAL

## 2022-01-14 ENCOUNTER — TELEPHONE (OUTPATIENT)
Dept: ADMINISTRATIVE | Facility: CLINIC | Age: 62
End: 2022-01-14
Payer: COMMERCIAL

## 2022-01-14 ENCOUNTER — PATIENT OUTREACH (OUTPATIENT)
Dept: ADMINISTRATIVE | Facility: CLINIC | Age: 62
End: 2022-01-14
Payer: COMMERCIAL

## 2022-01-14 NOTE — PROGRESS NOTES
5:30pm Pt discharged to home in stable condition with belongings. IV and tele removed. Pt did not want to wait on meds to come to bedside so her and her  are willing to walk to the pharmacy. Spoke with pharmacy rep and they are aware.  Pt leaving unit with . Refused wheelchair.     6:40 spoke with pharmacy rep. Pt never came to  meds. Will attempt to notify pt.

## 2022-01-14 NOTE — PROGRESS NOTES
C3 nurse spoke with Earl Abdul for a TCC post hospital discharge follow up call. The patient has a scheduled HOSFU appointment with Andrew Rodriguez MD on 1.25.22 @ [id number].

## 2022-01-14 NOTE — PATIENT INSTRUCTIONS
Patient Education       COVID-19 Discharge Instructions   About this topic   Coronavirus disease 2019 is also known as COVID-19. It is a viral illness that infects the lungs. It is caused by a virus called SARS-associated coronavirus (SARS-CoV-2).  The signs of COVID-19 most often start a few days after you have been infected. In some people, it takes longer to show signs. Others never show signs of the infection. You may have a cough, fever, shaking chills and it may be hard to breathe. You may be very tired, have muscle aches, a headache or sore throat. Some people have an upset stomach or loose stools. Others lose their sense of smell or taste. You may not have these signs all the time and they may come and go while you are sick.  The virus spreads easily through droplets when you talk, sneeze, or cough. You can pass the virus to others when you are talking close together, singing, hugging, sharing food, or shaking hands. Doctors believe the germs also survive on surfaces like tables, door handles, and telephones. However, this is not a common way that COVID-19 spreads. Doctors believe you can also spread the infection even if you dont have any symptoms, but they do not know how that happens. This is why getting vaccinated is one of the best ways to keep you healthy and slow the spread of the virus.  Some people have a mild case of COVID-19 and are able to stay at home and away from others until they feel better. Others may need to be in the hospital if they are very sick. Some people with COVID-19 can have some symptoms for weeks or months. People with COVID-19 must isolate themselves. You can start to be around others when your doctor says it is safe to do so.       What care is needed at home?   · Ask your doctor what you need to do when you go home. Make sure you ask questions if you do not understand what the doctor says.  · Drink lots of water, juice, or broth to replace fluids lost from a fever.  · You  may use cool mist humidifiers to help ease congestion and coughing.  · Use 2 to 3 pillows to prop yourself up when you lie down to make it easier to breathe and sleep.  · Do not smoke and do not drink beer, wine, and mixed drinks (alcohol).  · To lower the chance of passing the infection to others, get a COVID-19 vaccine after your infection has resolved.  · If you have not been fully vaccinated:  ? Wear a mask over your mouth and nose if you are around others who are not sick. Cloth masks work best if they have more than one layer of fabric.  ? Wash your hands often.  ? Stay home in a separate room, if possible, away from others. Only go out to get medical care.  ? Use a separate bathroom if possible.  ? Do not make food for others.  What follow-up care is needed?   · Your doctor may ask you to make visits to the office to check on your progress. Be sure to keep these visits. Make sure you wear a mask at these visits.  · If you can, tell the staff you have COVID-19 ahead of time so they can take extra care to stop the disease from spreading.  · It may take a few weeks before your health returns to normal.  What drugs may be needed?   The doctor may order drugs to:  · Help with breathing  · Help with fever  · Help with swelling in your airways and lungs  · Control coughing  · Ease a sore throat  · Help a runny or stuffy nose  Will physical activity be limited?   You may have to limit your physical activity. Talk to your doctor about the right amount of activity for you. If you have been very sick with COVID-19, it can take some time to get your strength back.  Will there be any other care needed?   Doctors do not know how long you can pass the virus on to others after you are sick. This is why it is important to stay in a separate room, if possible, when you are sick. For now, doctors are giving general guidelines for you to follow after you have been sick. Before you go around other people, you should:  · Be fever  free for 24 hours without taking any drugs to lower the fever  · Have no symptoms of cough or shortness of breath  · Wait at least 10 days after first having symptoms or your first positive test, and you need to be symptom free as above. Some experts suggest waiting 20 days if you have had a more severe infection.  Talk with your doctor about getting a COVID-19 vaccine.  What problems could happen?   · Fluid loss. This is dehydration.  · Short-term or long-term lung damage  · Heart problems  · Death  When do I need to call the doctor?   · You are having so much trouble breathing that you can only say one or two words at a time.  · You need to sit upright at all times to be able to breathe and/or cannot lie down.  · You are very confused or cannot stay awake.  · Your lips or skin start to turn blue or grey.  · You think you might be having a medical emergency. Some examples of medical emergencies are:  ? Severe chest pain.  ? Not able to speak or move normally.  · You have trouble breathing when talking or sitting still.  · You have new shortness of breath.  · You become weak or dizzy.  · You have very dark urine or do not pass urine for more than 8 hours.  · You have new or worsening COVID-19 symptoms like:  ? Fever  ? Cough  ? Feeling very tired  ? Shaking chills  ? Headache  ? Trouble swallowing  ? Throwing up  ? Loose stools  ? Reddish purple spots on your fingers or toes  Teach Back: Helping You Understand   The Teach Back Method helps you understand the information we are giving you. After you talk with the staff, tell them in your own words what you learned. This helps to make sure the staff has described each thing clearly. It also helps to explain things that may have been confusing. Before going home, make sure you can do these:  · I can tell you about my condition.  · I can tell you what may help ease my breathing.  · I can tell you what I can do to help avoid passing the infection to others.  · I can tell  you what I will do if I have trouble breathing; feel sleepy or confused; or my fingertips, fingernails, skin, or lips are blue.  Where can I learn more?   Centers for Disease Control and Prevention  https://www.cdc.gov/coronavirus/2019-ncov/about/index.html   Centers for Disease Control and Prevention  https://www.cdc.gov/coronavirus/2019-ncov/hcp/disposition-in-home-patients.html   World Health Organization  https://www.who.int/news-room/q-a-detail/l-u-bnwpgydffncyl   Last Reviewed Date   2021-10-05  Consumer Information Use and Disclaimer   This information is not specific medical advice and does not replace information you receive from your health care provider. This is only a brief summary of general information. It does NOT include all information about conditions, illnesses, injuries, tests, procedures, treatments, therapies, discharge instructions or life-style choices that may apply to you. You must talk with your health care provider for complete information about your health and treatment options. This information should not be used to decide whether or not to accept your health care providers advice, instructions or recommendations. Only your health care provider has the knowledge and training to provide advice that is right for you.  Copyright   Copyright © 2021 Up[employer], Inc. and its affiliates and/or licensors. All rights reserved.    Patricia teaching reviewed with Earl Abdul . She  verbalized understanding.    Education was provided based on the patient's discharge diagnosis using the attached Patricia patient education as a reference.

## 2022-01-15 NOTE — PLAN OF CARE
[facility] - [facility] ([facility])  Discharge Final Note    Primary Care Provider: Andrew Rodriguez MD    Expected Discharge Date: 1/13/2022    Final Discharge Note (most recent)       Final Note - 01/14/22 1845          Final Note    Assessment Type Final Discharge Note (P)      Anticipated Discharge Disposition Home or Self Care (P)                      Important Message from Medicare             Contact Info       Andrew Rodriguez MD   Specialty: Internal Medicine   Relationship: PCP - General    [address] [place] [id number]   Phone: [phone]       Next Steps: Follow up on 1/25/2022    Instructions: at 10:00 SM; PCP hospital follow up appointment, address hand tremor and potential neuro consult for more  evaluation once out of withdrawals

## 2022-01-25 ENCOUNTER — TELEPHONE (OUTPATIENT)
Dept: INTERNAL MEDICINE | Facility: CLINIC | Age: 62
End: 2022-01-25

## 2022-01-25 ENCOUNTER — OFFICE VISIT (OUTPATIENT)
Dept: INTERNAL MEDICINE | Facility: CLINIC | Age: 62
End: 2022-01-25
Attending: INTERNAL MEDICINE
Payer: COMMERCIAL

## 2022-01-25 ENCOUNTER — TELEPHONE (OUTPATIENT)
Dept: SLEEP MEDICINE | Facility: CLINIC | Age: 62
End: 2022-01-25
Payer: COMMERCIAL

## 2022-01-25 ENCOUNTER — TELEPHONE (OUTPATIENT)
Dept: INTERNAL MEDICINE | Facility: CLINIC | Age: 62
End: 2022-01-25
Payer: COMMERCIAL

## 2022-01-25 ENCOUNTER — TELEPHONE (OUTPATIENT)
Dept: UROLOGY | Facility: CLINIC | Age: 62
End: 2022-01-25
Payer: COMMERCIAL

## 2022-01-25 VITALS
WEIGHT: 211.88 LBS | HEIGHT: 66 IN | DIASTOLIC BLOOD PRESSURE: 82 MMHG | SYSTOLIC BLOOD PRESSURE: 114 MMHG | BODY MASS INDEX: 34.05 KG/M2

## 2022-01-25 DIAGNOSIS — K74.60 HEPATIC CIRRHOSIS, UNSPECIFIED HEPATIC CIRRHOSIS TYPE, UNSPECIFIED WHETHER ASCITES PRESENT: ICD-10-CM

## 2022-01-25 DIAGNOSIS — Z11.52 ENCOUNTER FOR SCREENING FOR COVID-19: Primary | ICD-10-CM

## 2022-01-25 DIAGNOSIS — R09.02 HYPOXIA: ICD-10-CM

## 2022-01-25 DIAGNOSIS — F10.21 ALCOHOL DEPENDENCE IN REMISSION: Primary | ICD-10-CM

## 2022-01-25 DIAGNOSIS — J41.0 SIMPLE CHRONIC BRONCHITIS: ICD-10-CM

## 2022-01-25 DIAGNOSIS — I10 ESSENTIAL HYPERTENSION: ICD-10-CM

## 2022-01-25 DIAGNOSIS — E11.9 CONTROLLED TYPE 2 DIABETES MELLITUS WITHOUT COMPLICATION, WITHOUT LONG-TERM CURRENT USE OF INSULIN: ICD-10-CM

## 2022-01-25 PROCEDURE — 99999 PR PBB SHADOW E&M-EST. PATIENT-LVL V: CPT | Mod: PBBFAC,,, | Performed by: INTERNAL MEDICINE

## 2022-01-25 PROCEDURE — 3074F PR MOST RECENT SYSTOLIC BLOOD PRESSURE < 130 MM HG: ICD-10-PCS | Mod: CPTII,S$GLB,, | Performed by: INTERNAL MEDICINE

## 2022-01-25 PROCEDURE — 1159F MED LIST DOCD IN RCRD: CPT | Mod: CPTII,S$GLB,, | Performed by: INTERNAL MEDICINE

## 2022-01-25 PROCEDURE — 1159F PR MEDICATION LIST DOCUMENTED IN MEDICAL RECORD: ICD-10-PCS | Mod: CPTII,S$GLB,, | Performed by: INTERNAL MEDICINE

## 2022-01-25 PROCEDURE — 3008F BODY MASS INDEX DOCD: CPT | Mod: CPTII,S$GLB,, | Performed by: INTERNAL MEDICINE

## 2022-01-25 PROCEDURE — 99214 OFFICE O/P EST MOD 30 MIN: CPT | Mod: S$GLB,,, | Performed by: INTERNAL MEDICINE

## 2022-01-25 PROCEDURE — 99999 PR PBB SHADOW E&M-EST. PATIENT-LVL V: ICD-10-PCS | Mod: PBBFAC,,, | Performed by: INTERNAL MEDICINE

## 2022-01-25 PROCEDURE — 3008F PR BODY MASS INDEX (BMI) DOCUMENTED: ICD-10-PCS | Mod: CPTII,S$GLB,, | Performed by: INTERNAL MEDICINE

## 2022-01-25 PROCEDURE — 99214 PR OFFICE/OUTPT VISIT, EST, LEVL IV, 30-39 MIN: ICD-10-PCS | Mod: S$GLB,,, | Performed by: INTERNAL MEDICINE

## 2022-01-25 PROCEDURE — 3079F DIAST BP 80-89 MM HG: CPT | Mod: CPTII,S$GLB,, | Performed by: INTERNAL MEDICINE

## 2022-01-25 PROCEDURE — 3079F PR MOST RECENT DIASTOLIC BLOOD PRESSURE 80-89 MM HG: ICD-10-PCS | Mod: CPTII,S$GLB,, | Performed by: INTERNAL MEDICINE

## 2022-01-25 PROCEDURE — 3074F SYST BP LT 130 MM HG: CPT | Mod: CPTII,S$GLB,, | Performed by: INTERNAL MEDICINE

## 2022-01-25 RX ORDER — TOBRAMYCIN AND DEXAMETHASONE 3; 1 MG/ML; MG/ML
1 SUSPENSION/ DROPS OPHTHALMIC 4 TIMES DAILY
COMMUNITY
Start: 2022-01-05 | End: 2022-03-10

## 2022-01-25 RX ORDER — ATORVASTATIN CALCIUM 10 MG/1
10 TABLET, FILM COATED ORAL EVERY MORNING
Status: ON HOLD | COMMUNITY
Start: 2021-10-28 | End: 2022-12-23

## 2022-01-25 RX ORDER — LEVOMEFOLATE/ALGAL OIL 15-90.314
1 CAPSULE ORAL EVERY MORNING
Status: ON HOLD | COMMUNITY
Start: 2021-11-11 | End: 2022-05-04 | Stop reason: HOSPADM

## 2022-01-25 RX ORDER — DULAGLUTIDE 1.5 MG/.5ML
1.5 INJECTION, SOLUTION SUBCUTANEOUS
Qty: 12 PEN | Refills: 0 | Status: SHIPPED | OUTPATIENT
Start: 2022-01-25 | End: 2022-03-10

## 2022-01-25 NOTE — TELEPHONE ENCOUNTER
Faxed [facility] VERDIN(FAX# [phone]) AND [facility] CONTRERAS(FAX# [phone]). I HAVE ATTACHED A CONFIRMATION SHEET TO [employer] FORMS LOCATED IN MY FILE CABINET

## 2022-01-25 NOTE — TELEPHONE ENCOUNTER
----- Message from Jodie Parra sent at 1/25/2022  3:55 PM CST -----  Regarding: Covid Order  Name of Who is Calling: Pulmonary on behalf of MARTY SALDAÑA [[id number]]           What is the request in detail: Pulmonary is requesting a pre-procedure COVID test order.  Please assist.           Can the clinic reply by [facility]:            What Number to Call Back if not in [facility]:

## 2022-01-25 NOTE — PROGRESS NOTES
"Subjective:       Patient ID: Earl Abdul is a 61 y.o. female.    Chief Complaint: hospital f/u    Here for hospital f/u  60 yo F with PMHx of HTN, HLD, Breast CA, DM, COPD, Sleep related hypoxia without VINICIO,  MDD (hx SA), Etoh abuse (withdrawal seizures in past and pancreatitis), Former Smoker 1ppd, Hepatic Steatosis, Hypothyroidism, Sarcoidosis of Lung (not active), PUD/Gastritis,hx C. Diff (2014)   atherosclerotic aorta     ### Tobacco abuse/ COPD ###  11/14/2020 CT Chest: Multiple small ground-glass opacities in the right lung measuring up to 1.2 cm.  Findings are new when compared with 08/29/2020, which may favor a low-grade infectious or inflammatory process.  Recommend follow-up with noncontrast chest CT in 6-12 months to evaluate for resolution. Mildly enlarged nolvia hepatis lymph nodes and axillary lymph nodes, similar when compared with prior CT      ### COVID 19 infection ###  Patient states that she went to Urgent Care and tested positive for COVID and due to her low oxygen level they advised her to goto the emergency room. Paitent was admitted. Started on covid treatment protocol. Began to experience sharp increase in anxiety tremulousness along with increased systolic BP to 190s. Endorsed signficant alcohol use. Treated for alcohol withdrawal with ativan PRN and Valium taper. [facility] recommendation to continue trazodone and Cymbalta while inpatient and to extend Valium taper for an additional day due to protracted withdrawal symptoms. Patient progressing towards baseline however still experiencing tremors and anxiety.Patient with signicant improvement to clinical status on 1/13    Nocturnal hypoxia diagnosed by sleep without an obstructive pattern and pt Rx night time oxygen. She is a current smoker. 30pk years.     ### Sleep related breathing disorder-- COPD ###  06/2020 CV Dr Gregorio  9/30/2020 PSG with Parasomnia montage: "Little SDB events appeared to have been noted. " "Snoring was noted to be mild. The patient did not meet split night criteria set by the doctor.       ### DM ###  Home -210      Review of Systems   Constitutional: Negative for chills, fatigue, fever and unexpected weight change.   HENT: Negative for ear pain, hearing loss, postnasal drip, tinnitus, trouble swallowing and voice change.    Respiratory: Negative for cough, chest tightness, shortness of breath and wheezing.    Cardiovascular: Negative for chest pain, palpitations and leg swelling.   Gastrointestinal: Negative for abdominal pain, blood in stool, diarrhea, nausea and vomiting.   Endocrine: Negative for polydipsia, polyphagia and polyuria.   Genitourinary: Negative for difficulty urinating, dysuria, hematuria and vaginal bleeding.   Skin: Negative for rash.   Allergic/Immunologic: Negative for food allergies.   Neurological: Negative for dizziness, numbness and headaches.   Hematological: Does not bruise/bleed easily.   Psychiatric/Behavioral: The patient is not nervous/anxious.        Objective:      Vitals:    01/25/22 0956   BP: 114/82   Weight: 96.1 kg (211 lb 13.8 oz)   Height: 5' 6" (1.676 m)      Physical Exam  Constitutional:       General: She is not in acute distress.     Appearance: She is well-developed and well-nourished.   HENT:      Head: Normocephalic and atraumatic.      Mouth/Throat:      Mouth: Oropharynx is clear and moist.      Pharynx: No oropharyngeal exudate.   Eyes:      General: No scleral icterus.     Extraocular Movements: EOM normal.      Conjunctiva/sclera: Conjunctivae normal.      Pupils: Pupils are equal, round, and reactive to light.   Neck:      Thyroid: No thyromegaly.   Cardiovascular:      Rate and Rhythm: Normal rate and regular rhythm.      Heart sounds: Normal heart sounds. No murmur heard.      Pulmonary:      Effort: Pulmonary effort is normal.      Breath sounds: Normal breath sounds. No wheezing or rales.   Abdominal:      General: There is no distension. "      Palpations: Abdomen is soft.      Tenderness: There is no abdominal tenderness.   Musculoskeletal:         General: No tenderness or edema.   Lymphadenopathy:      Cervical: No cervical adenopathy.   Skin:     General: Skin is warm and dry.   Neurological:      Mental Status: She is alert and oriented to person, place, and time.   Psychiatric:         Mood and Affect: Mood and affect normal.         Behavior: Behavior normal.         Assessment:       1. Alcohol dependence in remission    2. Hypoxia    3. Hepatic cirrhosis, unspecified hepatic cirrhosis type, unspecified whether ascites present    4. Simple chronic bronchitis    5. Essential hypertension    6. Controlled type 2 diabetes mellitus without complication, without long-term current use of insulin        Plan:       Earl was seen today for hospital f/u.    Diagnoses and all orders for this visit:    Alcohol dependence in remission  -     Ambulatory referral/consult to Addiction Psychiatry; Future    Hypoxia   Nocturnal hypoxia that is not obstructive and COPD that does not require exertional or oxygen at rest?? Refer to pulmonary to educate me on sleep related breathing disorders and what we need to focus on most COPD regimen vs weight loss vs reduction of sedation vs overall heart health and exercise tolerance. Thanks team.  -     Ambulatory referral/consult to Pulmonology; Future  -     Echo  -     Complete PFT with bronchodilator; Future    Hepatic cirrhosis, unspecified hepatic cirrhosis type, unspecified whether ascites present    Simple chronic bronchitis  -     Ambulatory referral/consult to Pulmonology; Future    Essential hypertension    Controlled type 2 diabetes mellitus without complication, without long-term current use of insulin  -     dulaglutide (TRULICITY) 1.5 mg/0.5 mL pen injector; Inject 1.5 mg into the skin every 7 days.  -     Ambulatory referral/consult to Optometry; Future           Andrew Chase MD  Internal  Medicine-[facility]        Side effects of medication(s) were discussed in detail and patient voiced understanding.  Patient will call back for any issues or complications.

## 2022-01-25 NOTE — TELEPHONE ENCOUNTER
The purpose of this communication is I received your ANGELA form for Ms. Earl Abdul. It is unclear as to what it is you are actually requesting. If it is the sleep study results those are found in her chart under encounters, specifically hospital encounter from 09/30/2021. If there is any additional information needed please let me know.         Regards,         Vanessa HYMAN, AMANDA  _____________________________________________    Did you want any results, Dr. Rodriguez?

## 2022-01-25 NOTE — TELEPHONE ENCOUNTER
"Pelvic Health 4 Week Behavior Check    Therapy Bowel mgmt- fiber, completing bowel diaries   Adherence Yes   Improvement Yes   Needs Additional Education? No   Desire to continue Yes     Topics Discussed:  - Bowel diary completed  - Taking fiber  - Improvement- 2x/day, "normal"      "

## 2022-01-26 ENCOUNTER — HOSPITAL ENCOUNTER (OUTPATIENT)
Dept: CARDIOLOGY | Facility: HOSPITAL | Age: 62
Discharge: HOME | End: 2022-01-26
Attending: INTERNAL MEDICINE
Payer: COMMERCIAL

## 2022-01-26 VITALS
SYSTOLIC BLOOD PRESSURE: 130 MMHG | HEIGHT: 66 IN | HEART RATE: 98 BPM | BODY MASS INDEX: 33.91 KG/M2 | DIASTOLIC BLOOD PRESSURE: 80 MMHG | WEIGHT: 211 LBS

## 2022-01-26 LAB
ASCENDING AORTA: 2.8 CM
AV INDEX (PROSTH): 0.9
AV MEAN GRADIENT: 4 MMHG
AV PEAK GRADIENT: 6 MMHG
AV VALVE AREA: 3.11 CM2
AV VELOCITY RATIO: 0.84
BSA FOR ECHO PROCEDURE: 2.11 M2
CV ECHO LV RWT: 0.35 CM
DOP CALC AO PEAK VEL: 1.22 M/S
DOP CALC AO VTI: 17.33 CM
DOP CALC LVOT AREA: 3.5 CM2
DOP CALC LVOT DIAMETER: 2.1 CM
DOP CALC LVOT PEAK VEL: 1.03 M/S
DOP CALC LVOT STROKE VOLUME: 53.87 CM3
DOP CALCLVOT PEAK VEL VTI: 15.56 CM
E WAVE DECELERATION TIME: 158.34 MSEC
E/A RATIO: 0.7
E/E' RATIO: 8.91 M/S
ECHO LV POSTERIOR WALL: 0.81 CM (ref 0.6–1.1)
EJECTION FRACTION: 65 %
FRACTIONAL SHORTENING: 34 % (ref 28–44)
INTERVENTRICULAR SEPTUM: 0.91 CM (ref 0.6–1.1)
LA MAJOR: 3.71 CM
LA MINOR: 3.71 CM
LA WIDTH: 2.32 CM
LEFT ATRIUM SIZE: 2.82 CM
LEFT ATRIUM VOLUME INDEX MOD: 8.9 ML/M2
LEFT ATRIUM VOLUME INDEX: 10.1 ML/M2
LEFT ATRIUM VOLUME MOD: 18.15 CM3
LEFT ATRIUM VOLUME: 20.63 CM3
LEFT INTERNAL DIMENSION IN SYSTOLE: 3.05 CM (ref 2.1–4)
LEFT VENTRICLE DIASTOLIC VOLUME INDEX: 47.41 ML/M2
LEFT VENTRICLE DIASTOLIC VOLUME: 97.2 ML
LEFT VENTRICLE MASS INDEX: 63 G/M2
LEFT VENTRICLE SYSTOLIC VOLUME INDEX: 17.8 ML/M2
LEFT VENTRICLE SYSTOLIC VOLUME: 36.54 ML
LEFT VENTRICULAR INTERNAL DIMENSION IN DIASTOLE: 4.6 CM (ref 3.5–6)
LEFT VENTRICULAR MASS: 129.64 G
LV LATERAL E/E' RATIO: 9.8 M/S
LV SEPTAL E/E' RATIO: 8.17 M/S
MV A" WAVE DURATION": 5.33 MSEC
MV PEAK A VEL: 0.7 M/S
MV PEAK E VEL: 0.49 M/S
MV STENOSIS PRESSURE HALF TIME: 45.92 MS
MV VALVE AREA P 1/2 METHOD: 4.79 CM2
PULM VEIN S/D RATIO: 1.37
PV PEAK D VEL: 0.27 M/S
PV PEAK S VEL: 0.37 M/S
RA MAJOR: 3.23 CM
RA PRESSURE: 3 MMHG
RA WIDTH: 1.99 CM
RIGHT VENTRICULAR END-DIASTOLIC DIMENSION: 3.18 CM
RV TISSUE DOPPLER FREE WALL SYSTOLIC VELOCITY 1 (APICAL 4 CHAMBER VIEW): 15.3 CM/S
SINUS: 3.2 CM
STJ: 2.7 CM
TDI LATERAL: 0.05 M/S
TDI SEPTAL: 0.06 M/S
TDI: 0.06 M/S
TRICUSPID ANNULAR PLANE SYSTOLIC EXCURSION: 2.12 CM

## 2022-01-26 PROCEDURE — 25500020 PHARM REV CODE 255: Performed by: INTERNAL MEDICINE

## 2022-01-26 PROCEDURE — 93306 TTE W/DOPPLER COMPLETE: CPT | Mod: 26,,, | Performed by: INTERNAL MEDICINE

## 2022-01-26 PROCEDURE — 93306 ECHO (CUPID ONLY): ICD-10-PCS | Mod: 26,,, | Performed by: INTERNAL MEDICINE

## 2022-01-26 PROCEDURE — C8929 TTE W OR WO FOL WCON,DOPPLER: HCPCS

## 2022-01-26 RX ADMIN — HUMAN ALBUMIN MICROSPHERES AND PERFLUTREN 0.66 MG: 10; .22 INJECTION, SOLUTION INTRAVENOUS at 12:01

## 2022-01-26 NOTE — NURSING
Patient identified by 2 identifiers. Denies previous reactions to blood transfusions, allergies reviewed & procedure explained.  24 g IV placed to Lt Hand, flushed w/ 10cc NS pre & post contrast administration.  3cc Optison administered by sonographer, echo images obtained.  Pt tolerated procedure well.  IV D/C'ed, preasure dsg applied.  Pt D/C'ed to home.

## 2022-01-26 NOTE — PROGRESS NOTES
Your heart is pumping and filling well and there are no significant valve abnormalities. Good news.    Respectfully,  Andrew Chase

## 2022-01-28 ENCOUNTER — TELEPHONE (OUTPATIENT)
Dept: INTERNAL MEDICINE | Facility: CLINIC | Age: 62
End: 2022-01-28
Payer: COMMERCIAL

## 2022-01-28 NOTE — TELEPHONE ENCOUNTER
MARTY SALDAÑA (Key: [id number]) - [id number]  Trulicity 1.5MG/0.5ML pen-injectors       Status: PA Request    Created: January 28th, 2022    Sent: January 28th, 2022

## 2022-01-31 ENCOUNTER — LAB VISIT (OUTPATIENT)
Dept: LAB | Facility: OTHER | Age: 62
End: 2022-01-31
Attending: INTERNAL MEDICINE
Payer: COMMERCIAL

## 2022-01-31 ENCOUNTER — PATIENT MESSAGE (OUTPATIENT)
Dept: INTERNAL MEDICINE | Facility: CLINIC | Age: 62
End: 2022-01-31
Payer: COMMERCIAL

## 2022-01-31 DIAGNOSIS — E03.9 HYPOTHYROIDISM, UNSPECIFIED TYPE: ICD-10-CM

## 2022-01-31 DIAGNOSIS — E53.8 LOW SERUM VITAMIN B12: ICD-10-CM

## 2022-01-31 DIAGNOSIS — G47.00 INSOMNIA, UNSPECIFIED TYPE: ICD-10-CM

## 2022-01-31 DIAGNOSIS — F41.8 DEPRESSION WITH ANXIETY: Primary | ICD-10-CM

## 2022-01-31 DIAGNOSIS — D64.9 ANEMIA, UNSPECIFIED TYPE: ICD-10-CM

## 2022-01-31 LAB
BASOPHILS # BLD AUTO: ABNORMAL K/UL (ref 0–0.2)
BASOPHILS NFR BLD: 0 % (ref 0–1.9)
DIFFERENTIAL METHOD: ABNORMAL
EOSINOPHIL # BLD AUTO: ABNORMAL K/UL (ref 0–0.5)
EOSINOPHIL NFR BLD: 0 % (ref 0–8)
ERYTHROCYTE [DISTWIDTH] IN BLOOD BY AUTOMATED COUNT: 14.8 % (ref 11.5–14.5)
FERRITIN SERPL-MCNC: 37 NG/ML (ref 20–300)
HCT VFR BLD AUTO: 43.3 % (ref 37–48.5)
HGB BLD-MCNC: 13.5 G/DL (ref 12–16)
IMM GRANULOCYTES # BLD AUTO: ABNORMAL K/UL (ref 0–0.04)
IMM GRANULOCYTES NFR BLD AUTO: ABNORMAL % (ref 0–0.5)
IRON SERPL-MCNC: 100 UG/DL (ref 30–160)
LYMPHOCYTES # BLD AUTO: ABNORMAL K/UL (ref 1–4.8)
LYMPHOCYTES NFR BLD: 40 % (ref 18–48)
MCH RBC QN AUTO: 25.8 PG (ref 27–31)
MCHC RBC AUTO-ENTMCNC: 31.2 G/DL (ref 32–36)
MCV RBC AUTO: 83 FL (ref 82–98)
MONOCYTES # BLD AUTO: ABNORMAL K/UL (ref 0.3–1)
MONOCYTES NFR BLD: 11 % (ref 4–15)
NEUTROPHILS NFR BLD: 49 % (ref 38–73)
NRBC BLD-RTO: 0 /100 WBC
PLATELET # BLD AUTO: 181 K/UL (ref 150–450)
PLATELET BLD QL SMEAR: ABNORMAL
PMV BLD AUTO: 11 FL (ref 9.2–12.9)
RBC # BLD AUTO: 5.23 M/UL (ref 4–5.4)
RETICS/RBC NFR AUTO: 1.9 % (ref 0.5–2.5)
SATURATED IRON: 18 % (ref 20–50)
TOTAL IRON BINDING CAPACITY: 551 UG/DL (ref 250–450)
TRANSFERRIN SERPL-MCNC: 372 MG/DL (ref 200–375)
TSH SERPL DL<=0.005 MIU/L-ACNC: 0.98 UIU/ML (ref 0.4–4)
VIT B12 SERPL-MCNC: 403 PG/ML (ref 210–950)
VIT B12 SERPL-MCNC: 403 PG/ML (ref 210–950)
WBC # BLD AUTO: 9.71 K/UL (ref 3.9–12.7)

## 2022-01-31 PROCEDURE — 82728 ASSAY OF FERRITIN: CPT | Performed by: INTERNAL MEDICINE

## 2022-01-31 PROCEDURE — 36415 COLL VENOUS BLD VENIPUNCTURE: CPT | Performed by: INTERNAL MEDICINE

## 2022-01-31 PROCEDURE — 85045 AUTOMATED RETICULOCYTE COUNT: CPT | Performed by: INTERNAL MEDICINE

## 2022-01-31 PROCEDURE — 84466 ASSAY OF TRANSFERRIN: CPT | Performed by: INTERNAL MEDICINE

## 2022-01-31 PROCEDURE — 85007 BL SMEAR W/DIFF WBC COUNT: CPT | Performed by: INTERNAL MEDICINE

## 2022-01-31 PROCEDURE — 82607 VITAMIN B-12: CPT | Performed by: INTERNAL MEDICINE

## 2022-01-31 PROCEDURE — 83921 ORGANIC ACID SINGLE QUANT: CPT | Performed by: INTERNAL MEDICINE

## 2022-01-31 PROCEDURE — 85027 COMPLETE CBC AUTOMATED: CPT | Performed by: INTERNAL MEDICINE

## 2022-01-31 PROCEDURE — 84443 ASSAY THYROID STIM HORMONE: CPT | Performed by: INTERNAL MEDICINE

## 2022-01-31 RX ORDER — TRAZODONE HYDROCHLORIDE 100 MG/1
100 TABLET ORAL NIGHTLY PRN
Qty: 90 TABLET | Refills: 1 | Status: SHIPPED | OUTPATIENT
Start: 2022-01-31 | End: 2022-04-27 | Stop reason: SDUPTHER

## 2022-01-31 RX ORDER — DULOXETIN HYDROCHLORIDE 30 MG/1
30 CAPSULE, DELAYED RELEASE ORAL DAILY
Qty: 90 CAPSULE | Refills: 1 | Status: SHIPPED | OUTPATIENT
Start: 2022-01-31 | End: 2022-03-11

## 2022-01-31 NOTE — TELEPHONE ENCOUNTER
No new care gaps identified.  Powered by [email or web address]. Reference number: [id number].   1/31/2022 1:27:30 PM CST

## 2022-01-31 NOTE — TELEPHONE ENCOUNTER
----- Message from Ivanna Sierra sent at 1/31/2022 12:51 PM CST -----  Regarding: Patient call back  Who called:PIPER MARTY SELENA [[id number]]    What is the request in detail: Patient is requesting a call back.  She states she needs DULoxetine (CYMBALTA) (60 MG) and traZODone (DESYREL)  (300 MG). She states she is going through withdrawals. [id number] - [place], [place] - [address] [place] [address] AT [facility] as soon as possible. She would also like to speak with Dr. Rodriguez.   Please advise.    Can the clinic reply by [facility]? No    Best call back number: [phone]    Additional Information: N/A

## 2022-02-04 LAB — METHYLMALONATE SERPL-SCNC: 0.14 UMOL/L

## 2022-02-09 ENCOUNTER — PATIENT OUTREACH (OUTPATIENT)
Dept: ADMINISTRATIVE | Facility: OTHER | Age: 62
End: 2022-02-09
Payer: COMMERCIAL

## 2022-02-09 DIAGNOSIS — E11.9 CONTROLLED TYPE 2 DIABETES MELLITUS WITHOUT COMPLICATION, WITHOUT LONG-TERM CURRENT USE OF INSULIN: Primary | ICD-10-CM

## 2022-03-03 ENCOUNTER — TELEPHONE (OUTPATIENT)
Dept: UROLOGY | Facility: CLINIC | Age: 62
End: 2022-03-03
Payer: COMMERCIAL

## 2022-03-07 ENCOUNTER — TELEPHONE (OUTPATIENT)
Dept: INTERNAL MEDICINE | Facility: CLINIC | Age: 62
End: 2022-03-07
Payer: COMMERCIAL

## 2022-03-07 NOTE — TELEPHONE ENCOUNTER
Who called Henrique,      What is the request in detail: would like to schedule the PFT appt that was missed in January. Please call     Can the clinic reply by [facility]?     Would the patient rather a call back or a response via [facility]? Call     Best call back number: [phone]         Called and spoke to Henrique who is requesting a rescheduled PFT. Given 03/11/2022 3894

## 2022-03-09 ENCOUNTER — PATIENT MESSAGE (OUTPATIENT)
Dept: ADMINISTRATIVE | Facility: HOSPITAL | Age: 62
End: 2022-03-09
Payer: COMMERCIAL

## 2022-03-09 ENCOUNTER — PATIENT OUTREACH (OUTPATIENT)
Dept: ADMINISTRATIVE | Facility: HOSPITAL | Age: 62
End: 2022-03-09
Payer: COMMERCIAL

## 2022-03-09 DIAGNOSIS — E11.9 DIABETES MELLITUS WITHOUT COMPLICATION: ICD-10-CM

## 2022-03-09 DIAGNOSIS — E78.5 HYPERLIPIDEMIA, UNSPECIFIED HYPERLIPIDEMIA TYPE: Primary | ICD-10-CM

## 2022-03-10 ENCOUNTER — OFFICE VISIT (OUTPATIENT)
Dept: URGENT CARE | Facility: CLINIC | Age: 62
End: 2022-03-10
Payer: COMMERCIAL

## 2022-03-10 ENCOUNTER — LAB VISIT (OUTPATIENT)
Dept: LAB | Facility: HOSPITAL | Age: 62
End: 2022-03-10
Attending: INTERNAL MEDICINE
Payer: COMMERCIAL

## 2022-03-10 ENCOUNTER — OFFICE VISIT (OUTPATIENT)
Dept: ENDOCRINOLOGY | Facility: CLINIC | Age: 62
End: 2022-03-10
Payer: COMMERCIAL

## 2022-03-10 VITALS
BODY MASS INDEX: 33.82 KG/M2 | HEIGHT: 66 IN | OXYGEN SATURATION: 93 % | DIASTOLIC BLOOD PRESSURE: 78 MMHG | HEART RATE: 106 BPM | SYSTOLIC BLOOD PRESSURE: 118 MMHG | WEIGHT: 210.44 LBS

## 2022-03-10 VITALS
RESPIRATION RATE: 18 BRPM | OXYGEN SATURATION: 97 % | HEART RATE: 110 BPM | TEMPERATURE: 98 F | DIASTOLIC BLOOD PRESSURE: 61 MMHG | WEIGHT: 210 LBS | BODY MASS INDEX: 33.75 KG/M2 | SYSTOLIC BLOOD PRESSURE: 135 MMHG | HEIGHT: 66 IN

## 2022-03-10 DIAGNOSIS — E11.9 TYPE 2 DIABETES MELLITUS WITHOUT COMPLICATION, UNSPECIFIED WHETHER LONG TERM INSULIN USE: ICD-10-CM

## 2022-03-10 DIAGNOSIS — E78.5 HYPERLIPIDEMIA, UNSPECIFIED HYPERLIPIDEMIA TYPE: Chronic | ICD-10-CM

## 2022-03-10 DIAGNOSIS — Z20.822 EXPOSURE TO COVID-19 VIRUS: ICD-10-CM

## 2022-03-10 DIAGNOSIS — E66.9 OBESITY (BMI 30.0-34.9): ICD-10-CM

## 2022-03-10 DIAGNOSIS — E11.65 TYPE 2 DIABETES MELLITUS WITH HYPERGLYCEMIA, WITHOUT LONG-TERM CURRENT USE OF INSULIN: ICD-10-CM

## 2022-03-10 DIAGNOSIS — E03.9 HYPOTHYROIDISM, UNSPECIFIED TYPE: ICD-10-CM

## 2022-03-10 DIAGNOSIS — E11.65 UNCONTROLLED TYPE 2 DIABETES MELLITUS WITH HYPERGLYCEMIA: ICD-10-CM

## 2022-03-10 DIAGNOSIS — J02.9 SORE THROAT: ICD-10-CM

## 2022-03-10 DIAGNOSIS — J06.9 VIRAL URI: Primary | ICD-10-CM

## 2022-03-10 DIAGNOSIS — E11.65 UNCONTROLLED TYPE 2 DIABETES MELLITUS WITH HYPERGLYCEMIA: Primary | ICD-10-CM

## 2022-03-10 LAB
ALBUMIN SERPL BCP-MCNC: 4 G/DL (ref 3.5–5.2)
ALP SERPL-CCNC: 69 U/L (ref 55–135)
ALT SERPL W/O P-5'-P-CCNC: 55 U/L (ref 10–44)
ANION GAP SERPL CALC-SCNC: 11 MMOL/L (ref 8–16)
AST SERPL-CCNC: 32 U/L (ref 10–40)
BILIRUB SERPL-MCNC: 0.4 MG/DL (ref 0.1–1)
BUN SERPL-MCNC: 11 MG/DL (ref 8–23)
CALCIUM SERPL-MCNC: 9.1 MG/DL (ref 8.7–10.5)
CHLORIDE SERPL-SCNC: 103 MMOL/L (ref 95–110)
CHOLEST SERPL-MCNC: 163 MG/DL (ref 120–199)
CHOLEST/HDLC SERPL: 3.5 {RATIO} (ref 2–5)
CO2 SERPL-SCNC: 26 MMOL/L (ref 23–29)
CREAT SERPL-MCNC: 0.7 MG/DL (ref 0.5–1.4)
CTP QC/QA: YES
EST. GFR  (AFRICAN AMERICAN): >60 ML/MIN/1.73 M^2
EST. GFR  (NON AFRICAN AMERICAN): >60 ML/MIN/1.73 M^2
ESTIMATED AVG GLUCOSE: 143 MG/DL (ref 68–131)
GLUCOSE SERPL-MCNC: 119 MG/DL (ref 70–110)
HBA1C MFR BLD: 6.6 % (ref 4–5.6)
HDLC SERPL-MCNC: 47 MG/DL (ref 40–75)
HDLC SERPL: 28.8 % (ref 20–50)
LDLC SERPL CALC-MCNC: 78.2 MG/DL (ref 63–159)
NONHDLC SERPL-MCNC: 116 MG/DL
POTASSIUM SERPL-SCNC: 4.1 MMOL/L (ref 3.5–5.1)
PROT SERPL-MCNC: 7.1 G/DL (ref 6–8.4)
SARS-COV-2 RDRP RESP QL NAA+PROBE: NEGATIVE
SODIUM SERPL-SCNC: 140 MMOL/L (ref 136–145)
TRIGL SERPL-MCNC: 189 MG/DL (ref 30–150)
TSH SERPL DL<=0.005 MIU/L-ACNC: 0.94 UIU/ML (ref 0.4–4)

## 2022-03-10 PROCEDURE — 84443 ASSAY THYROID STIM HORMONE: CPT | Performed by: INTERNAL MEDICINE

## 2022-03-10 PROCEDURE — 3061F PR NEG MICROALBUMINURIA RESULT DOCUMENTED/REVIEW: ICD-10-PCS | Mod: CPTII,S$GLB,, | Performed by: NURSE PRACTITIONER

## 2022-03-10 PROCEDURE — 1160F RVW MEDS BY RX/DR IN RCRD: CPT | Mod: CPTII,S$GLB,, | Performed by: NURSE PRACTITIONER

## 2022-03-10 PROCEDURE — 4010F PR ACE/ARB THEARPY RXD/TAKEN: ICD-10-PCS | Mod: CPTII,S$GLB,, | Performed by: NURSE PRACTITIONER

## 2022-03-10 PROCEDURE — 1160F PR REVIEW ALL MEDS BY PRESCRIBER/CLIN PHARMACIST DOCUMENTED: ICD-10-PCS | Mod: CPTII,S$GLB,, | Performed by: NURSE PRACTITIONER

## 2022-03-10 PROCEDURE — 3044F PR MOST RECENT HEMOGLOBIN A1C LEVEL <7.0%: ICD-10-PCS | Mod: CPTII,S$GLB,, | Performed by: INTERNAL MEDICINE

## 2022-03-10 PROCEDURE — 3078F PR MOST RECENT DIASTOLIC BLOOD PRESSURE < 80 MM HG: ICD-10-PCS | Mod: CPTII,S$GLB,, | Performed by: INTERNAL MEDICINE

## 2022-03-10 PROCEDURE — 3061F PR NEG MICROALBUMINURIA RESULT DOCUMENTED/REVIEW: ICD-10-PCS | Mod: CPTII,S$GLB,, | Performed by: INTERNAL MEDICINE

## 2022-03-10 PROCEDURE — 3066F NEPHROPATHY DOC TX: CPT | Mod: CPTII,S$GLB,, | Performed by: INTERNAL MEDICINE

## 2022-03-10 PROCEDURE — 3066F PR DOCUMENTATION OF TREATMENT FOR NEPHROPATHY: ICD-10-PCS | Mod: CPTII,S$GLB,, | Performed by: NURSE PRACTITIONER

## 2022-03-10 PROCEDURE — 3061F NEG MICROALBUMINURIA REV: CPT | Mod: CPTII,S$GLB,, | Performed by: NURSE PRACTITIONER

## 2022-03-10 PROCEDURE — 1159F PR MEDICATION LIST DOCUMENTED IN MEDICAL RECORD: ICD-10-PCS | Mod: CPTII,S$GLB,, | Performed by: INTERNAL MEDICINE

## 2022-03-10 PROCEDURE — 1159F MED LIST DOCD IN RCRD: CPT | Mod: CPTII,S$GLB,, | Performed by: NURSE PRACTITIONER

## 2022-03-10 PROCEDURE — 3044F PR MOST RECENT HEMOGLOBIN A1C LEVEL <7.0%: ICD-10-PCS | Mod: CPTII,S$GLB,, | Performed by: NURSE PRACTITIONER

## 2022-03-10 PROCEDURE — 3074F PR MOST RECENT SYSTOLIC BLOOD PRESSURE < 130 MM HG: ICD-10-PCS | Mod: CPTII,S$GLB,, | Performed by: INTERNAL MEDICINE

## 2022-03-10 PROCEDURE — 1159F PR MEDICATION LIST DOCUMENTED IN MEDICAL RECORD: ICD-10-PCS | Mod: CPTII,S$GLB,, | Performed by: NURSE PRACTITIONER

## 2022-03-10 PROCEDURE — 99213 PR OFFICE/OUTPT VISIT, EST, LEVL III, 20-29 MIN: ICD-10-PCS | Mod: S$GLB,,, | Performed by: NURSE PRACTITIONER

## 2022-03-10 PROCEDURE — 3008F BODY MASS INDEX DOCD: CPT | Mod: CPTII,S$GLB,, | Performed by: INTERNAL MEDICINE

## 2022-03-10 PROCEDURE — 99213 OFFICE O/P EST LOW 20 MIN: CPT | Mod: S$GLB,,, | Performed by: NURSE PRACTITIONER

## 2022-03-10 PROCEDURE — 99204 PR OFFICE/OUTPT VISIT, NEW, LEVL IV, 45-59 MIN: ICD-10-PCS | Mod: S$GLB,,, | Performed by: INTERNAL MEDICINE

## 2022-03-10 PROCEDURE — 1160F RVW MEDS BY RX/DR IN RCRD: CPT | Mod: CPTII,S$GLB,, | Performed by: INTERNAL MEDICINE

## 2022-03-10 PROCEDURE — 3044F HG A1C LEVEL LT 7.0%: CPT | Mod: CPTII,S$GLB,, | Performed by: INTERNAL MEDICINE

## 2022-03-10 PROCEDURE — U0002 COVID-19 LAB TEST NON-CDC: HCPCS | Mod: QW,S$GLB,, | Performed by: NURSE PRACTITIONER

## 2022-03-10 PROCEDURE — 4010F PR ACE/ARB THEARPY RXD/TAKEN: ICD-10-PCS | Mod: CPTII,S$GLB,, | Performed by: INTERNAL MEDICINE

## 2022-03-10 PROCEDURE — 4010F ACE/ARB THERAPY RXD/TAKEN: CPT | Mod: CPTII,S$GLB,, | Performed by: INTERNAL MEDICINE

## 2022-03-10 PROCEDURE — 3008F PR BODY MASS INDEX (BMI) DOCUMENTED: ICD-10-PCS | Mod: CPTII,S$GLB,, | Performed by: NURSE PRACTITIONER

## 2022-03-10 PROCEDURE — 3075F SYST BP GE 130 - 139MM HG: CPT | Mod: CPTII,S$GLB,, | Performed by: NURSE PRACTITIONER

## 2022-03-10 PROCEDURE — 3044F HG A1C LEVEL LT 7.0%: CPT | Mod: CPTII,S$GLB,, | Performed by: NURSE PRACTITIONER

## 2022-03-10 PROCEDURE — 3066F PR DOCUMENTATION OF TREATMENT FOR NEPHROPATHY: ICD-10-PCS | Mod: CPTII,S$GLB,, | Performed by: INTERNAL MEDICINE

## 2022-03-10 PROCEDURE — 1159F MED LIST DOCD IN RCRD: CPT | Mod: CPTII,S$GLB,, | Performed by: INTERNAL MEDICINE

## 2022-03-10 PROCEDURE — 3008F BODY MASS INDEX DOCD: CPT | Mod: CPTII,S$GLB,, | Performed by: NURSE PRACTITIONER

## 2022-03-10 PROCEDURE — 3075F PR MOST RECENT SYSTOLIC BLOOD PRESS GE 130-139MM HG: ICD-10-PCS | Mod: CPTII,S$GLB,, | Performed by: NURSE PRACTITIONER

## 2022-03-10 PROCEDURE — 83036 HEMOGLOBIN GLYCOSYLATED A1C: CPT | Performed by: INTERNAL MEDICINE

## 2022-03-10 PROCEDURE — 3078F PR MOST RECENT DIASTOLIC BLOOD PRESSURE < 80 MM HG: ICD-10-PCS | Mod: CPTII,S$GLB,, | Performed by: NURSE PRACTITIONER

## 2022-03-10 PROCEDURE — 3074F SYST BP LT 130 MM HG: CPT | Mod: CPTII,S$GLB,, | Performed by: INTERNAL MEDICINE

## 2022-03-10 PROCEDURE — 4010F ACE/ARB THERAPY RXD/TAKEN: CPT | Mod: CPTII,S$GLB,, | Performed by: NURSE PRACTITIONER

## 2022-03-10 PROCEDURE — 99999 PR PBB SHADOW E&M-EST. PATIENT-LVL V: ICD-10-PCS | Mod: PBBFAC,,, | Performed by: INTERNAL MEDICINE

## 2022-03-10 PROCEDURE — 1160F PR REVIEW ALL MEDS BY PRESCRIBER/CLIN PHARMACIST DOCUMENTED: ICD-10-PCS | Mod: CPTII,S$GLB,, | Performed by: INTERNAL MEDICINE

## 2022-03-10 PROCEDURE — 99999 PR PBB SHADOW E&M-EST. PATIENT-LVL V: CPT | Mod: PBBFAC,,, | Performed by: INTERNAL MEDICINE

## 2022-03-10 PROCEDURE — 3061F NEG MICROALBUMINURIA REV: CPT | Mod: CPTII,S$GLB,, | Performed by: INTERNAL MEDICINE

## 2022-03-10 PROCEDURE — U0002: ICD-10-PCS | Mod: QW,S$GLB,, | Performed by: NURSE PRACTITIONER

## 2022-03-10 PROCEDURE — 3078F DIAST BP <80 MM HG: CPT | Mod: CPTII,S$GLB,, | Performed by: NURSE PRACTITIONER

## 2022-03-10 PROCEDURE — 80053 COMPREHEN METABOLIC PANEL: CPT | Performed by: INTERNAL MEDICINE

## 2022-03-10 PROCEDURE — 80061 LIPID PANEL: CPT | Performed by: INTERNAL MEDICINE

## 2022-03-10 PROCEDURE — 3008F PR BODY MASS INDEX (BMI) DOCUMENTED: ICD-10-PCS | Mod: CPTII,S$GLB,, | Performed by: INTERNAL MEDICINE

## 2022-03-10 PROCEDURE — 3078F DIAST BP <80 MM HG: CPT | Mod: CPTII,S$GLB,, | Performed by: INTERNAL MEDICINE

## 2022-03-10 PROCEDURE — 3066F NEPHROPATHY DOC TX: CPT | Mod: CPTII,S$GLB,, | Performed by: NURSE PRACTITIONER

## 2022-03-10 PROCEDURE — 36415 COLL VENOUS BLD VENIPUNCTURE: CPT | Performed by: INTERNAL MEDICINE

## 2022-03-10 PROCEDURE — 99204 OFFICE O/P NEW MOD 45 MIN: CPT | Mod: S$GLB,,, | Performed by: INTERNAL MEDICINE

## 2022-03-10 RX ORDER — DULOXETIN HYDROCHLORIDE 20 MG/1
60 CAPSULE, DELAYED RELEASE ORAL DAILY
Status: ON HOLD | COMMUNITY
End: 2022-05-04 | Stop reason: HOSPADM

## 2022-03-10 RX ORDER — METOPROLOL SUCCINATE 50 MG/1
50 TABLET, EXTENDED RELEASE ORAL DAILY
Status: ON HOLD | COMMUNITY
End: 2022-05-04 | Stop reason: HOSPADM

## 2022-03-10 RX ORDER — ARIPIPRAZOLE 5 MG/1
5 TABLET ORAL DAILY
Status: ON HOLD | COMMUNITY
End: 2022-05-04 | Stop reason: HOSPADM

## 2022-03-10 RX ORDER — METHOCARBAMOL 750 MG/1
750 TABLET, FILM COATED ORAL 3 TIMES DAILY
Status: ON HOLD | COMMUNITY
End: 2022-05-04 | Stop reason: HOSPADM

## 2022-03-10 RX ORDER — MIRTAZAPINE 30 MG/1
30 TABLET, FILM COATED ORAL NIGHTLY
COMMUNITY
End: 2022-03-11

## 2022-03-10 RX ORDER — OMEGA-3 FATTY ACIDS 1000 MG
1 CAPSULE ORAL DAILY
Status: ON HOLD | COMMUNITY
End: 2022-09-27

## 2022-03-10 RX ORDER — SEMAGLUTIDE 1.34 MG/ML
0.5 INJECTION, SOLUTION SUBCUTANEOUS
Qty: 1.5 ML | Refills: 11 | Status: ON HOLD | OUTPATIENT
Start: 2022-03-10 | End: 2022-10-01 | Stop reason: HOSPADM

## 2022-03-10 RX ORDER — CLONIDINE HYDROCHLORIDE 0.1 MG/1
0.1 TABLET ORAL 4 TIMES DAILY
Status: ON HOLD | COMMUNITY
End: 2022-05-04 | Stop reason: HOSPADM

## 2022-03-10 RX ORDER — ROPINIROLE 0.25 MG/1
0.25 TABLET, FILM COATED ORAL NIGHTLY
Status: ON HOLD | COMMUNITY
End: 2022-05-04 | Stop reason: HOSPADM

## 2022-03-10 RX ORDER — LISINOPRIL 10 MG/1
10 TABLET ORAL DAILY
COMMUNITY
End: 2022-03-11

## 2022-03-10 RX ORDER — GABAPENTIN 300 MG/1
300 CAPSULE ORAL 3 TIMES DAILY
COMMUNITY
End: 2022-03-10 | Stop reason: SDUPTHER

## 2022-03-10 RX ORDER — GABAPENTIN 300 MG/1
300 CAPSULE ORAL 2 TIMES DAILY
Qty: 60 CAPSULE | Refills: 11 | Status: SHIPPED | OUTPATIENT
Start: 2022-03-10 | End: 2022-03-22

## 2022-03-10 RX ORDER — METFORMIN HYDROCHLORIDE 500 MG/1
1000 TABLET, EXTENDED RELEASE ORAL 2 TIMES DAILY WITH MEALS
Qty: 360 TABLET | Refills: 3 | Status: SHIPPED | OUTPATIENT
Start: 2022-03-10 | End: 2023-04-06 | Stop reason: SDUPTHER

## 2022-03-10 RX ORDER — LEVOTHYROXINE SODIUM 50 UG/1
50 TABLET ORAL
COMMUNITY
End: 2022-03-25 | Stop reason: SDUPTHER

## 2022-03-10 NOTE — PATIENT INSTRUCTIONS
CDC Testing and Quarantine Guidelines for patients with exposure to a known-positive COVID-19 person:  A 'close exposure' is defined as anyone who has had an exposure (masked or unmasked) to a known COVID -19 positive person   within 6 feet of someone   for a cumulative total of 15 minutes or more over a 24-hour period.   vaccinated Have been boosted or completed the primary series of Pfizer or Moderna vaccine within the last 6 months or completed the primary series of J&J vaccine within the last 2 months and/or had a positive test within 90 days   do NOT need to quarantine after contact with someone who had COVID-19 unless they have symptoms.   fully vaccinated people who have not had a positive test within 90 days, should get tested 5 days after their exposure, even if they don't have symptoms and wear a mask indoors in public for 10 days following exposure or until their test result is negative on day 5.  If you develop symptoms test and quarantine.    Unvaccinated, or are more than six months out from their second mRNA dose (or more than 2 months after the J&J vaccine) and not yet boosted,  and/or NOT had a positive test within 90 days and meet 'close exposure'  you are required by CDC guidelines to quarantine for at least 5 days from time of exposure followed by 5 days of strict masking. It is recommended, but not required to test after 5 days, unless you develop symptoms, in which case you should test at that time.  If you do decide to test at 5 days and are asymptomatic, the risk is that if you test without symptoms on Day 5 for example) and you are positive, your 5 day isolation begins on that day, and you turned your 5 day quarantine into 10 days.  If your exposure does not meet the above definition, you can return to your normal daily activities to include social distancing, wearing a mask and frequent handwashing.  Alternatively, if a 5-day quarantine is not feasible, it is imperative that an exposed  person wear a well-fitting mask at all times when around others for 10 days after exposure.

## 2022-03-10 NOTE — PROGRESS NOTES
"Subjective:       Patient ID: Earl Abdul is a 61 y.o. female.    Vitals:  height is 5' 6" (1.676 m) and weight is 95.3 kg (210 lb). Her temperature is 97.7 °F (36.5 °C). Her blood pressure is 135/61 and her pulse is 110. Her respiration is 18 and oxygen saturation is 97%.     Chief Complaint: Fever    Patient presents with concern for covid 19.  She states that she was with a friend over the weekend who has covid 19.  She started with mild symptoms of sore throat, cough, postnasal drip, runny nose, headache, body aches two days ago.   Subjective unmeasured fever last night.  Offered flu test, states she does not feel like she has the flu, states symptoms are mild and she has not needed any otc meds for this.      Fever   This is a new problem. The problem occurs constantly. The problem has been unchanged. Her temperature was unmeasured prior to arrival. Associated symptoms include coughing, headaches, muscle aches, sleepiness and a sore throat. Pertinent negatives include no abdominal pain, chest pain, congestion, diarrhea, ear pain, nausea, rash, urinary pain, vomiting or wheezing. Associated symptoms comments: Runny nose. She has tried acetaminophen for the symptoms. The treatment provided no relief.       Constitution: Positive for sweating, fatigue and fever. Negative for chills.   HENT: Positive for postnasal drip and sore throat. Negative for ear pain, congestion, sinus pain, sinus pressure, trouble swallowing and voice change.         Itchy ears   Cardiovascular: Negative for chest pain.   Respiratory: Positive for cough and shortness of breath. Negative for chest tightness, sputum production and wheezing.    Gastrointestinal: Negative for abdominal pain, nausea, vomiting and diarrhea.   Genitourinary: Negative for dysuria.   Skin: Negative for rash.   Neurological: Positive for headaches.       Objective:      Physical Exam   Constitutional: She is oriented to person, place, and time. She appears " well-developed. She is cooperative.  Non-toxic appearance. She does not appear ill. No distress.   HENT:   Head: Normocephalic and atraumatic.   Ears:   Right Ear: Hearing, tympanic membrane, external ear and ear canal normal.   Left Ear: Hearing, tympanic membrane, external ear and ear canal normal.   Nose: Nose normal. No mucosal edema, rhinorrhea or nasal deformity. No epistaxis. Right sinus exhibits no maxillary sinus tenderness and no frontal sinus tenderness. Left sinus exhibits no maxillary sinus tenderness and no frontal sinus tenderness.   Mouth/Throat: Uvula is midline and mucous membranes are normal. No trismus in the jaw. Normal dentition. No uvula swelling. Posterior oropharyngeal erythema present. No oropharyngeal exudate or posterior oropharyngeal edema.   Eyes: Conjunctivae and lids are normal. No scleral icterus.   Neck: Trachea normal and phonation normal. Neck supple. No edema present. No erythema present. No neck rigidity present.   Cardiovascular: Normal rate, regular rhythm, normal heart sounds and normal pulses.   Pulmonary/Chest: Effort normal and breath sounds normal. No respiratory distress. She has no decreased breath sounds. She has no rhonchi.   Abdominal: Normal appearance.   Musculoskeletal: Normal range of motion.         General: No deformity. Normal range of motion.   Neurological: She is alert and oriented to person, place, and time. She exhibits normal muscle tone. Coordination normal.   Skin: Skin is warm, dry, intact, not diaphoretic and not pale.   Psychiatric: Her speech is normal and behavior is normal. Judgment and thought content normal.   Nursing note and vitals reviewed.    Results for orders placed or performed in visit on 03/10/22   POCT COVID-19 Rapid Screening   Result Value Ref Range    POC Rapid COVID Negative Negative     Acceptable Yes            Assessment:       1. Fever, unspecified fever cause          Plan:       Covid 19 negative.  She notes  that she had covid 19 in the last 2 months.    Fever, unspecified fever cause  -     POCT COVID-19 Rapid Screening      Patient Instructions   CDC Testing and Quarantine Guidelines for patients with exposure to a known-positive COVID-19 person:   A 'close exposure' is defined as anyone who has had an exposure (masked or unmasked) to a known COVID -19 positive person   o within 6 feet of someone   o for a cumulative total of 15 minutes or more over a 24-hour period.    vaccinated Have been boosted or completed the primary series of Pfizer or Moderna vaccine within the last 6 months or completed the primary series of J&J vaccine within the last 2 months and/or had a positive test within 90 days   o do NOT need to quarantine after contact with someone who had COVID-19 unless they have symptoms.   o fully vaccinated people who have not had a positive test within 90 days, should get tested 5 days after their exposure, even if they don't have symptoms and wear a mask indoors in public for 10 days following exposure or until their test result is negative on day 5.  o If you develop symptoms test and quarantine.     Unvaccinated, or are more than six months out from their second mRNA dose (or more than 2 months after the J&J vaccine) and not yet boosted,  and/or NOT had a positive test within 90 days and meet 'close exposure'  o you are required by CDC guidelines to quarantine for at least 5 days from time of exposure followed by 5 days of strict masking. It is recommended, but not required to test after 5 days, unless you develop symptoms, in which case you should test at that time.  o If you do decide to test at 5 days and are asymptomatic, the risk is that if you test without symptoms on Day 5 for example) and you are positive, your 5 day isolation begins on that day, and you turned your 5 day quarantine into 10 days.  o If your exposure does not meet the above definition, you can return to your normal daily  activities to include social distancing, wearing a mask and frequent handwashing.  o Alternatively, if a 5-day quarantine is not feasible, it is imperative that an exposed person wear a well-fitting mask at all times when around others for 10 days after exposure.

## 2022-03-10 NOTE — PATIENT INSTRUCTIONS
We will change from Trulicity to Ozempic  Ozempic start  Start taking Ozempic 0.25 mg once weekly for 4 weeks then increase to 0.5 mg once weekly for at least 4 weeks    Depending on your glucose we will consider the need to increase to 1 mg once weekly.     Potential Side Effects of Ozempic:   One of the ways this medication works is by decreasing how fast your stomach empties, which makes you feel full faster after you eat and should help you lose weight. The main side-effects are related to GI upset, such as nausea, bloating and abdominal cramps. You can usually avoid this by eating slowly (take half of your normal portion and eat it over 30 minutes). If you do experience nausea, it does tend to get better after the first few weeks. The most severe reaction is acute pancreatitis which can cause severe abdominal pain radiating to your back. If you experience this, stop the medication and go to the ER. Fortunately this is very rare.    A couple of weeks after starting ozempic we will start Metformin    Metformin Start  Please start taking metformin 500 milligrams once every evening with dinner     If you tolerate this dose for one week, please start taking metformin 500 milligrams twice daily, so you will be taking 500mg in the morning with breakfast and 500 milligrams in the evening with dinner    If you tolerate this dose for one week, increase your evening dose to 1000 milligrams (so you will be taking 500mg in the morning and 1000 milligrams in the evening)     If you tolerate this dose for one week, increase your morning dose to 1000 milligrams (so you will be taking 1000mg in the morning and 1000 milligrams in the evening)    Some of the common side effects of Metformin are nausea, vomiting and diarrhea.  This medicine should be taken with meals.  These side effects usually go away after several weeks on the medication.  These side effects can be minimized by increasing the dose gradually over a period of  weeks, as prescribed by your doctor.       *Please take with food. This will help reduce the risk of GI side effects    *If you are having side effects, most commonly diarrhea and stomach upset, wait to increase the dose until your symptoms have resolved - ie, if it takes you longer than 1 week to get to the next highest dose, this is fine.

## 2022-03-10 NOTE — PROGRESS NOTES
Earl Abdul is a 61 y.o. female with HTN, hypothyroidism, alcoholic pancreatitis referred by [facility] for evaluation of T2DM    History of Present Illness  T2DM  Diagnosed in 1/2022 with A1c 7% (I do not have this result but she reports this on outside labs)  Known complications: neuropathy    History of pancreatitis related to ETOH. No longer drinking alcohol  CT 4/2021 without pancreatic abnormality    Started on Trulicity but she is concerned about eye effects of this and has heard of good results with ozempic so asking to try this    Current Diabetes Regimen:  Trulicity 1.5 mg weekly      Prior mediations tried:  none    Diet/Exercise:  Working on reducing carbs  Has not seen DM education    Recent Hgb A1C:  Lab Results   Component Value Date    HGBA1C 6.6 (H) 03/10/2022       Glucose Monitoring:            Hypoglycemic Episodes: none    Screening / DM Complications:    Nephropathy:  ACEi/ARB: Taking  Lab Results   Component Value Date    MICALBCREAT Unable to calculate 03/10/2022       Last Lipid Panel:  Statin: Taking  Lab Results   Component Value Date    LDLCALC 78.2 03/10/2022       Last foot exam Most Recent Foot Exam Date: Not Found  Last eye exam Most Recent Eye Exam Date: Not Found;  no laser surgery or DR    B12:  Lab Results   Component Value Date    OBFPJAFS34 403 01/31/2022    IMYNJZWQ14 403 01/31/2022     Hypothyroidism  Taking LT4 50 mcg daily    Lab Results   Component Value Date    TSH 0.940 03/10/2022           Current Outpatient Medications:     ARIPiprazole (ABILIFY) 5 MG Tab, Take 5 mg by mouth once daily., Disp: , Rfl:     atorvastatin (LIPITOR) 10 MG tablet, Take 10 mg by mouth once daily., Disp: , Rfl:     cloNIDine (CATAPRES) 0.1 MG tablet, Take 0.1 mg by mouth 4 (four) times daily., Disp: , Rfl:     DEPLIN, ALGAL OIL, 15-90.314 mg Cap, Take 1 capsule by mouth every morning., Disp: , Rfl:     DULoxetine (CYMBALTA) 20 MG capsule, Take 40 mg by mouth once daily., Disp: ,  Rfl:     FLUoxetine 60 mg Tab, Take 60 mg by mouth once daily. , Disp: , Rfl:     hydrOXYzine pamoate (VISTARIL) 50 MG Cap, Take 25 mg by mouth every 8 (eight) hours as needed., Disp: , Rfl:     levothyroxine (SYNTHROID) 50 MCG tablet, Take 50 mcg by mouth before breakfast., Disp: , Rfl:     methocarbamoL (ROBAXIN) 750 MG Tab, Take 750 mg by mouth 3 (three) times daily., Disp: , Rfl:     metoprolol succinate (TOPROL-XL) 50 MG 24 hr tablet, Take 50 mg by mouth once daily., Disp: , Rfl:     omega-3 fatty acids 1,000 mg Cap, Take 1 capsule by mouth once daily., Disp: , Rfl:     pantoprazole (PROTONIX) 40 MG tablet, Take 1 tablet (40 mg total) by mouth once daily., Disp: 30 tablet, Rfl: 0    rOPINIRole (REQUIP) 0.25 MG tablet, Take 0.25 mg by mouth every evening., Disp: , Rfl:     SPIRIVA WITH HANDIHALER 18 mcg inhalation capsule, 1 capsule every morning., Disp: , Rfl:     traZODone (DESYREL) 100 MG tablet, Take 1 tablet (100 mg total) by mouth nightly as needed for Insomnia. (Patient taking differently: Take 300 mg by mouth nightly as needed for Insomnia.), Disp: 90 tablet, Rfl: 1    gabapentin (NEURONTIN) 300 MG capsule, Take 1 capsule (300 mg total) by mouth 2 (two) times daily., Disp: 60 capsule, Rfl: 11    lisinopriL (PRINIVIL,ZESTRIL) 20 MG tablet, Take 1 tablet (20 mg total) by mouth once daily., Disp: 90 tablet, Rfl: 1    metFORMIN (GLUCOPHAGE-XR) 500 MG ER 24hr tablet, Take 2 tablets (1,000 mg total) by mouth 2 (two) times daily with meals., Disp: 360 tablet, Rfl: 3    semaglutide (OZEMPIC) 0.25 mg or 0.5 mg(2 mg/1.5 mL) pen injector, Inject 0.5 mg into the skin every 7 days. Start with 0.25 mg weekly for 4 weeks and then increase to 0.5 mg if you are tolerating this dose, Disp: 1.5 mL, Rfl: 11  No current facility-administered medications for this visit.    Facility-Administered Medications Ordered in Other Visits:     albuterol sulfate nebulizer solution 2.5 mg, 2.5 mg, Nebulization, Once,  Andrew Rodriguez MD    ROS as above    Objective:     Vitals:    03/10/22 1100   BP: 118/78   Pulse: 106     Wt Readings from Last 3 Encounters:   03/11/22 95.2 kg (209 lb 14.1 oz)   03/10/22 95.3 kg (210 lb)   03/10/22 95.5 kg (210 lb 6.9 oz)     Body mass index is 33.96 kg/m².  Physical Exam  Constitutional:       Appearance: She is well-developed.   HENT:      Head: Normocephalic.   Eyes:      Conjunctiva/sclera: Conjunctivae normal.   Pulmonary:      Effort: Pulmonary effort is normal.   Musculoskeletal:         General: Normal range of motion.   Skin:     General: Skin is warm.      Findings: No rash.   Neurological:      Mental Status: She is alert and oriented to person, place, and time.     Protective Sensation (w/ 10 gram monofilament):3.10.22  Right: Intact  Left: Intact    Visual Inspection:  Normal -  Bilateral    Pedal Pulses:   Right: Present  Left: Present    Posterior tibialis:   Right:Present  Left: Present        Labs    Chemistry        Component Value Date/Time     03/10/2022 1231    K 4.1 03/10/2022 1231     03/10/2022 1231    CO2 26 03/10/2022 1231    BUN 11 03/10/2022 1231    CREATININE 0.7 03/10/2022 1231     (H) 03/10/2022 1231        Component Value Date/Time    CALCIUM 9.1 03/10/2022 1231    ALKPHOS 69 03/10/2022 1231    AST 32 03/10/2022 1231    ALT 55 (H) 03/10/2022 1231    BILITOT 0.4 03/10/2022 1231    ESTGFRAFRICA >60.0 03/10/2022 1231    EGFRNONAA >60.0 03/10/2022 1231              Assessment and Plan     Type 2 diabetes mellitus with hyperglycemia  Comes in today to discuss change from Trulicity to Ozempic. She is concerned about eye effects although reviewed that this was seen with Ozempic although rare and she has no known eye disease. Ok to cont glp and has upcoming eye exam  History of pancreatitis due to alcohol although she is no longer drinking. Discussed increased risk of pancreatitis with GLP but as tolerating and no longer drinking I think reasonable  to continue    Review of glucose with fasting sugars in 200's. Discussed use of both metformin and GLP and she is open to this    Update labs today    Patient Instructions   We will change from Trulicity to Ozempic  Ozempic start  Start taking Ozempic 0.25 mg once weekly for 4 weeks then increase to 0.5 mg once weekly for at least 4 weeks    Depending on your glucose we will consider the need to increase to 1 mg once weekly.     Potential Side Effects of Ozempic:   One of the ways this medication works is by decreasing how fast your stomach empties, which makes you feel full faster after you eat and should help you lose weight. The main side-effects are related to GI upset, such as nausea, bloating and abdominal cramps. You can usually avoid this by eating slowly (take half of your normal portion and eat it over 30 minutes). If you do experience nausea, it does tend to get better after the first few weeks. The most severe reaction is acute pancreatitis which can cause severe abdominal pain radiating to your back. If you experience this, stop the medication and go to the ER. Fortunately this is very rare.    A couple of weeks after starting ozempic we will start Metformin    Metformin Start  Please start taking metformin 500 milligrams once every evening with dinner     If you tolerate this dose for one week, please start taking metformin 500 milligrams twice daily, so you will be taking 500mg in the morning with breakfast and 500 milligrams in the evening with dinner    If you tolerate this dose for one week, increase your evening dose to 1000 milligrams (so you will be taking 500mg in the morning and 1000 milligrams in the evening)     If you tolerate this dose for one week, increase your morning dose to 1000 milligrams (so you will be taking 1000mg in the morning and 1000 milligrams in the evening)    Some of the common side effects of Metformin are nausea, vomiting and diarrhea.  This medicine should be taken  with meals.  These side effects usually go away after several weeks on the medication.  These side effects can be minimized by increasing the dose gradually over a period of weeks, as prescribed by your doctor.       *Please take with food. This will help reduce the risk of GI side effects    *If you are having side effects, most commonly diarrhea and stomach upset, wait to increase the dose until your symptoms have resolved - ie, if it takes you longer than 1 week to get to the next highest dose, this is fine.          Hypothyroidism  Update tsh today  Cot LT4 50 mcg daily with dose titration if needed pending results    Hyperlipidemia  Update labs today    Obesity (BMI 30.0-34.9)  Ozempic as above  Refer to DM education to discuss dietary modification      RTC 6 mo        Melissa Washington MD

## 2022-03-11 ENCOUNTER — HOSPITAL ENCOUNTER (OUTPATIENT)
Dept: PULMONOLOGY | Facility: OTHER | Age: 62
Discharge: HOME | End: 2022-03-11
Attending: INTERNAL MEDICINE
Payer: COMMERCIAL

## 2022-03-11 ENCOUNTER — OFFICE VISIT (OUTPATIENT)
Dept: INTERNAL MEDICINE | Facility: CLINIC | Age: 62
End: 2022-03-11
Attending: INTERNAL MEDICINE
Payer: COMMERCIAL

## 2022-03-11 VITALS
DIASTOLIC BLOOD PRESSURE: 82 MMHG | BODY MASS INDEX: 33.73 KG/M2 | OXYGEN SATURATION: 95 % | SYSTOLIC BLOOD PRESSURE: 130 MMHG | WEIGHT: 209.88 LBS | HEIGHT: 66 IN | HEART RATE: 122 BPM

## 2022-03-11 DIAGNOSIS — I10 ESSENTIAL HYPERTENSION: ICD-10-CM

## 2022-03-11 DIAGNOSIS — K59.1 FUNCTIONAL DIARRHEA: Primary | ICD-10-CM

## 2022-03-11 DIAGNOSIS — R09.02 HYPOXIA: ICD-10-CM

## 2022-03-11 PROCEDURE — 3075F SYST BP GE 130 - 139MM HG: CPT | Mod: CPTII,S$GLB,, | Performed by: INTERNAL MEDICINE

## 2022-03-11 PROCEDURE — 99999 PR PBB SHADOW E&M-EST. PATIENT-LVL IV: ICD-10-PCS | Mod: PBBFAC,,, | Performed by: INTERNAL MEDICINE

## 2022-03-11 PROCEDURE — 99213 PR OFFICE/OUTPT VISIT, EST, LEVL III, 20-29 MIN: ICD-10-PCS | Mod: S$GLB,,, | Performed by: INTERNAL MEDICINE

## 2022-03-11 PROCEDURE — 3079F PR MOST RECENT DIASTOLIC BLOOD PRESSURE 80-89 MM HG: ICD-10-PCS | Mod: CPTII,S$GLB,, | Performed by: INTERNAL MEDICINE

## 2022-03-11 PROCEDURE — 3075F PR MOST RECENT SYSTOLIC BLOOD PRESS GE 130-139MM HG: ICD-10-PCS | Mod: CPTII,S$GLB,, | Performed by: INTERNAL MEDICINE

## 2022-03-11 PROCEDURE — 94729 DIFFUSING CAPACITY: CPT

## 2022-03-11 PROCEDURE — 3066F PR DOCUMENTATION OF TREATMENT FOR NEPHROPATHY: ICD-10-PCS | Mod: CPTII,S$GLB,, | Performed by: INTERNAL MEDICINE

## 2022-03-11 PROCEDURE — 3061F PR NEG MICROALBUMINURIA RESULT DOCUMENTED/REVIEW: ICD-10-PCS | Mod: CPTII,S$GLB,, | Performed by: INTERNAL MEDICINE

## 2022-03-11 PROCEDURE — 3044F HG A1C LEVEL LT 7.0%: CPT | Mod: CPTII,S$GLB,, | Performed by: INTERNAL MEDICINE

## 2022-03-11 PROCEDURE — 3008F BODY MASS INDEX DOCD: CPT | Mod: CPTII,S$GLB,, | Performed by: INTERNAL MEDICINE

## 2022-03-11 PROCEDURE — 3079F DIAST BP 80-89 MM HG: CPT | Mod: CPTII,S$GLB,, | Performed by: INTERNAL MEDICINE

## 2022-03-11 PROCEDURE — 3044F PR MOST RECENT HEMOGLOBIN A1C LEVEL <7.0%: ICD-10-PCS | Mod: CPTII,S$GLB,, | Performed by: INTERNAL MEDICINE

## 2022-03-11 PROCEDURE — 99999 PR PBB SHADOW E&M-EST. PATIENT-LVL IV: CPT | Mod: PBBFAC,,, | Performed by: INTERNAL MEDICINE

## 2022-03-11 PROCEDURE — 1159F PR MEDICATION LIST DOCUMENTED IN MEDICAL RECORD: ICD-10-PCS | Mod: CPTII,S$GLB,, | Performed by: INTERNAL MEDICINE

## 2022-03-11 PROCEDURE — 1160F PR REVIEW ALL MEDS BY PRESCRIBER/CLIN PHARMACIST DOCUMENTED: ICD-10-PCS | Mod: CPTII,S$GLB,, | Performed by: INTERNAL MEDICINE

## 2022-03-11 PROCEDURE — 3061F NEG MICROALBUMINURIA REV: CPT | Mod: CPTII,S$GLB,, | Performed by: INTERNAL MEDICINE

## 2022-03-11 PROCEDURE — 3008F PR BODY MASS INDEX (BMI) DOCUMENTED: ICD-10-PCS | Mod: CPTII,S$GLB,, | Performed by: INTERNAL MEDICINE

## 2022-03-11 PROCEDURE — 94727 GAS DIL/WSHOT DETER LNG VOL: CPT

## 2022-03-11 PROCEDURE — 3066F NEPHROPATHY DOC TX: CPT | Mod: CPTII,S$GLB,, | Performed by: INTERNAL MEDICINE

## 2022-03-11 PROCEDURE — 4010F PR ACE/ARB THEARPY RXD/TAKEN: ICD-10-PCS | Mod: CPTII,S$GLB,, | Performed by: INTERNAL MEDICINE

## 2022-03-11 PROCEDURE — 94010 BREATHING CAPACITY TEST: CPT

## 2022-03-11 PROCEDURE — 99213 OFFICE O/P EST LOW 20 MIN: CPT | Mod: S$GLB,,, | Performed by: INTERNAL MEDICINE

## 2022-03-11 PROCEDURE — 1159F MED LIST DOCD IN RCRD: CPT | Mod: CPTII,S$GLB,, | Performed by: INTERNAL MEDICINE

## 2022-03-11 PROCEDURE — 1160F RVW MEDS BY RX/DR IN RCRD: CPT | Mod: CPTII,S$GLB,, | Performed by: INTERNAL MEDICINE

## 2022-03-11 PROCEDURE — 4010F ACE/ARB THERAPY RXD/TAKEN: CPT | Mod: CPTII,S$GLB,, | Performed by: INTERNAL MEDICINE

## 2022-03-11 RX ORDER — ALBUTEROL SULFATE 2.5 MG/.5ML
2.5 SOLUTION RESPIRATORY (INHALATION) ONCE
Status: DISCONTINUED | OUTPATIENT
Start: 2022-03-11 | End: 2022-03-11 | Stop reason: HOSPADM

## 2022-03-11 RX ORDER — LISINOPRIL 20 MG/1
20 TABLET ORAL DAILY
Qty: 90 TABLET | Refills: 1 | Status: ON HOLD | OUTPATIENT
Start: 2022-03-11 | End: 2022-10-01 | Stop reason: SDUPTHER

## 2022-03-11 NOTE — PROGRESS NOTES
"Subjective:       Patient ID: Earl Abdul is a 61 y.o. female.    Chief Complaint: discuss medications    Here for f/u    Would like to review medication list.     BP above goal. Increase lisinopril.            Review of Systems   Constitutional: Positive for activity change. Negative for unexpected weight change.   HENT: Negative for hearing loss, rhinorrhea and trouble swallowing.    Eyes: Negative for discharge and visual disturbance.   Respiratory: Negative for chest tightness and wheezing.    Cardiovascular: Negative for chest pain and palpitations.   Gastrointestinal: Negative for blood in stool, constipation, diarrhea and vomiting.   Endocrine: Positive for polydipsia and polyuria.   Genitourinary: Positive for difficulty urinating. Negative for dysuria, hematuria and menstrual problem.   Musculoskeletal: Positive for arthralgias. Negative for joint swelling and neck pain.   Neurological: Positive for weakness and headaches.   Psychiatric/Behavioral: Negative for confusion and dysphoric mood.       Objective:      Vitals:    03/11/22 1446   BP: 130/82   Pulse: (!) 122   SpO2: 95%   Weight: 95.2 kg (209 lb 14.1 oz)   Height: 5' 6" (1.676 m)      Physical Exam  Constitutional:       General: She is not in acute distress.     Appearance: She is well-developed.   HENT:      Head: Normocephalic and atraumatic.      Mouth/Throat:      Pharynx: No oropharyngeal exudate.   Eyes:      General: No scleral icterus.     Conjunctiva/sclera: Conjunctivae normal.      Pupils: Pupils are equal, round, and reactive to light.   Neck:      Thyroid: No thyromegaly.   Cardiovascular:      Rate and Rhythm: Normal rate and regular rhythm.      Heart sounds: Normal heart sounds. No murmur heard.  Pulmonary:      Effort: Pulmonary effort is normal.      Breath sounds: Normal breath sounds. No wheezing or rales.   Abdominal:      General: There is no distension.      Palpations: Abdomen is soft.      Tenderness: There is no " abdominal tenderness.   Musculoskeletal:         General: No tenderness.   Lymphadenopathy:      Cervical: No cervical adenopathy.   Skin:     General: Skin is warm and dry.   Neurological:      Mental Status: She is alert and oriented to person, place, and time.   Psychiatric:         Behavior: Behavior normal.         Assessment:       1. Functional diarrhea    2. Essential hypertension        Plan:       Earl was seen today for discuss medications.    Diagnoses and all orders for this visit:    Functional diarrhea   F/u GI  Essential hypertension  -    INCREASE  lisinopriL (PRINIVIL,ZESTRIL) 20 MG tablet; Take 1 tablet (20 mg total) by mouth once daily.           Andrew Chase MD  Internal Medicine-[facility]        Side effects of medication(s) were discussed in detail and patient voiced understanding.  Patient will call back for any issues or complications.

## 2022-03-13 PROBLEM — E11.65 TYPE 2 DIABETES MELLITUS WITH HYPERGLYCEMIA: Status: ACTIVE | Noted: 2021-11-30

## 2022-03-13 NOTE — ASSESSMENT & PLAN NOTE
Comes in today to discuss change from Trulicity to Ozempic. She is concerned about eye effects although reviewed that this was seen with Ozempic although rare and she has no known eye disease. Ok to cont glp and has upcoming eye exam  History of pancreatitis due to alcohol although she is no longer drinking. Discussed increased risk of pancreatitis with GLP but as tolerating and no longer drinking I think reasonable to continue    Review of glucose with fasting sugars in 200's. Discussed use of both metformin and GLP and she is open to this    Update labs today    Patient Instructions   We will change from Trulicity to Ozempic  Ozempic start  Start taking Ozempic 0.25 mg once weekly for 4 weeks then increase to 0.5 mg once weekly for at least 4 weeks    Depending on your glucose we will consider the need to increase to 1 mg once weekly.     Potential Side Effects of Ozempic:   One of the ways this medication works is by decreasing how fast your stomach empties, which makes you feel full faster after you eat and should help you lose weight. The main side-effects are related to GI upset, such as nausea, bloating and abdominal cramps. You can usually avoid this by eating slowly (take half of your normal portion and eat it over 30 minutes). If you do experience nausea, it does tend to get better after the first few weeks. The most severe reaction is acute pancreatitis which can cause severe abdominal pain radiating to your back. If you experience this, stop the medication and go to the ER. Fortunately this is very rare.    A couple of weeks after starting ozempic we will start Metformin    Metformin Start  Please start taking metformin 500 milligrams once every evening with dinner     If you tolerate this dose for one week, please start taking metformin 500 milligrams twice daily, so you will be taking 500mg in the morning with breakfast and 500 milligrams in the evening with dinner    If you tolerate this dose for one  week, increase your evening dose to 1000 milligrams (so you will be taking 500mg in the morning and 1000 milligrams in the evening)     If you tolerate this dose for one week, increase your morning dose to 1000 milligrams (so you will be taking 1000mg in the morning and 1000 milligrams in the evening)    Some of the common side effects of Metformin are nausea, vomiting and diarrhea.  This medicine should be taken with meals.  These side effects usually go away after several weeks on the medication.  These side effects can be minimized by increasing the dose gradually over a period of weeks, as prescribed by your doctor.       *Please take with food. This will help reduce the risk of GI side effects    *If you are having side effects, most commonly diarrhea and stomach upset, wait to increase the dose until your symptoms have resolved - ie, if it takes you longer than 1 week to get to the next highest dose, this is fine.

## 2022-03-15 ENCOUNTER — OFFICE VISIT (OUTPATIENT)
Dept: PULMONOLOGY | Facility: CLINIC | Age: 62
End: 2022-03-15
Payer: COMMERCIAL

## 2022-03-15 VITALS
HEART RATE: 108 BPM | WEIGHT: 209.44 LBS | SYSTOLIC BLOOD PRESSURE: 130 MMHG | DIASTOLIC BLOOD PRESSURE: 72 MMHG | OXYGEN SATURATION: 91 % | BODY MASS INDEX: 33.66 KG/M2 | HEIGHT: 66 IN

## 2022-03-15 DIAGNOSIS — J44.9 CHRONIC OBSTRUCTIVE PULMONARY DISEASE, UNSPECIFIED COPD TYPE: Primary | ICD-10-CM

## 2022-03-15 DIAGNOSIS — E66.2 CLASS 1 OBESITY WITH ALVEOLAR HYPOVENTILATION, SERIOUS COMORBIDITY, AND BODY MASS INDEX (BMI) OF 33.0 TO 33.9 IN ADULT: ICD-10-CM

## 2022-03-15 DIAGNOSIS — R91.1 SOLITARY PULMONARY NODULE: ICD-10-CM

## 2022-03-15 DIAGNOSIS — R91.8 MULTIPLE SUBSOLID LUNG NODULES GREATER THAN 6 MM IN DIAMETER: ICD-10-CM

## 2022-03-15 DIAGNOSIS — R09.02 HYPOXIA: ICD-10-CM

## 2022-03-15 DIAGNOSIS — D86.9 SARCOIDOSIS: ICD-10-CM

## 2022-03-15 DIAGNOSIS — J41.0 SIMPLE CHRONIC BRONCHITIS: ICD-10-CM

## 2022-03-15 PROBLEM — J96.11 CHRONIC HYPOXEMIC RESPIRATORY FAILURE: Chronic | Status: ACTIVE | Noted: 2022-01-08

## 2022-03-15 PROCEDURE — 3078F PR MOST RECENT DIASTOLIC BLOOD PRESSURE < 80 MM HG: ICD-10-PCS | Mod: CPTII,S$GLB,, | Performed by: INTERNAL MEDICINE

## 2022-03-15 PROCEDURE — 4010F ACE/ARB THERAPY RXD/TAKEN: CPT | Mod: CPTII,S$GLB,, | Performed by: INTERNAL MEDICINE

## 2022-03-15 PROCEDURE — 3078F DIAST BP <80 MM HG: CPT | Mod: CPTII,S$GLB,, | Performed by: INTERNAL MEDICINE

## 2022-03-15 PROCEDURE — 1159F MED LIST DOCD IN RCRD: CPT | Mod: CPTII,S$GLB,, | Performed by: INTERNAL MEDICINE

## 2022-03-15 PROCEDURE — 3066F NEPHROPATHY DOC TX: CPT | Mod: CPTII,S$GLB,, | Performed by: INTERNAL MEDICINE

## 2022-03-15 PROCEDURE — 3008F BODY MASS INDEX DOCD: CPT | Mod: CPTII,S$GLB,, | Performed by: INTERNAL MEDICINE

## 2022-03-15 PROCEDURE — 1159F PR MEDICATION LIST DOCUMENTED IN MEDICAL RECORD: ICD-10-PCS | Mod: CPTII,S$GLB,, | Performed by: INTERNAL MEDICINE

## 2022-03-15 PROCEDURE — 3075F SYST BP GE 130 - 139MM HG: CPT | Mod: CPTII,S$GLB,, | Performed by: INTERNAL MEDICINE

## 2022-03-15 PROCEDURE — 99999 PR PBB SHADOW E&M-EST. PATIENT-LVL V: ICD-10-PCS | Mod: PBBFAC,,, | Performed by: INTERNAL MEDICINE

## 2022-03-15 PROCEDURE — 4010F PR ACE/ARB THEARPY RXD/TAKEN: ICD-10-PCS | Mod: CPTII,S$GLB,, | Performed by: INTERNAL MEDICINE

## 2022-03-15 PROCEDURE — 3044F HG A1C LEVEL LT 7.0%: CPT | Mod: CPTII,S$GLB,, | Performed by: INTERNAL MEDICINE

## 2022-03-15 PROCEDURE — 3008F PR BODY MASS INDEX (BMI) DOCUMENTED: ICD-10-PCS | Mod: CPTII,S$GLB,, | Performed by: INTERNAL MEDICINE

## 2022-03-15 PROCEDURE — 99215 OFFICE O/P EST HI 40 MIN: CPT | Mod: S$GLB,,, | Performed by: INTERNAL MEDICINE

## 2022-03-15 PROCEDURE — 99999 PR PBB SHADOW E&M-EST. PATIENT-LVL V: CPT | Mod: PBBFAC,,, | Performed by: INTERNAL MEDICINE

## 2022-03-15 PROCEDURE — 3061F PR NEG MICROALBUMINURIA RESULT DOCUMENTED/REVIEW: ICD-10-PCS | Mod: CPTII,S$GLB,, | Performed by: INTERNAL MEDICINE

## 2022-03-15 PROCEDURE — 3044F PR MOST RECENT HEMOGLOBIN A1C LEVEL <7.0%: ICD-10-PCS | Mod: CPTII,S$GLB,, | Performed by: INTERNAL MEDICINE

## 2022-03-15 PROCEDURE — 3061F NEG MICROALBUMINURIA REV: CPT | Mod: CPTII,S$GLB,, | Performed by: INTERNAL MEDICINE

## 2022-03-15 PROCEDURE — 3066F PR DOCUMENTATION OF TREATMENT FOR NEPHROPATHY: ICD-10-PCS | Mod: CPTII,S$GLB,, | Performed by: INTERNAL MEDICINE

## 2022-03-15 PROCEDURE — 99215 PR OFFICE/OUTPT VISIT, EST, LEVL V, 40-54 MIN: ICD-10-PCS | Mod: S$GLB,,, | Performed by: INTERNAL MEDICINE

## 2022-03-15 PROCEDURE — 3075F PR MOST RECENT SYSTOLIC BLOOD PRESS GE 130-139MM HG: ICD-10-PCS | Mod: CPTII,S$GLB,, | Performed by: INTERNAL MEDICINE

## 2022-03-15 RX ORDER — CEPHALEXIN 500 MG/1
1 CAPSULE ORAL
Status: ON HOLD | COMMUNITY
Start: 2021-07-14 | End: 2022-05-04 | Stop reason: HOSPADM

## 2022-03-15 RX ORDER — IPRATROPIUM BROMIDE 17 UG/1
AEROSOL, METERED RESPIRATORY (INHALATION)
COMMUNITY
Start: 2022-03-03 | End: 2022-03-15 | Stop reason: SDUPTHER

## 2022-03-15 RX ORDER — DISULFIRAM 250 MG/1
TABLET ORAL
Status: ON HOLD | COMMUNITY
Start: 2021-09-29 | End: 2022-05-04 | Stop reason: HOSPADM

## 2022-03-15 RX ORDER — HYDROCHLOROTHIAZIDE 25 MG/1
25 TABLET ORAL DAILY
Status: ON HOLD | COMMUNITY
Start: 2022-03-03 | End: 2022-05-04 | Stop reason: HOSPADM

## 2022-03-15 RX ORDER — TIOTROPIUM BROMIDE AND OLODATEROL 3.124; 2.736 UG/1; UG/1
1 SPRAY, METERED RESPIRATORY (INHALATION) 2 TIMES DAILY
Qty: 4 G | Refills: 11 | Status: ON HOLD | OUTPATIENT
Start: 2022-03-15 | End: 2022-12-23

## 2022-03-15 RX ORDER — IPRATROPIUM BROMIDE 17 UG/1
2 AEROSOL, METERED RESPIRATORY (INHALATION) EVERY 6 HOURS PRN
Qty: 12.9 G | Refills: 11 | Status: ON HOLD | OUTPATIENT
Start: 2022-03-15 | End: 2023-04-03 | Stop reason: HOSPADM
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
## 2022-03-16 ENCOUNTER — PATIENT MESSAGE (OUTPATIENT)
Dept: ENDOCRINOLOGY | Facility: CLINIC | Age: 62
End: 2022-03-16
Payer: COMMERCIAL

## 2022-03-22 ENCOUNTER — PATIENT MESSAGE (OUTPATIENT)
Dept: INTERNAL MEDICINE | Facility: CLINIC | Age: 62
End: 2022-03-22
Payer: COMMERCIAL

## 2022-03-22 DIAGNOSIS — F41.8 DEPRESSION WITH ANXIETY: Primary | ICD-10-CM

## 2022-03-22 RX ORDER — GABAPENTIN 300 MG/1
300 CAPSULE ORAL 2 TIMES DAILY
Qty: 60 CAPSULE | Refills: 11 | Status: CANCELLED | OUTPATIENT
Start: 2022-03-22

## 2022-03-22 RX ORDER — FLUOXETINE HYDROCHLORIDE 40 MG/1
CAPSULE ORAL DAILY
Qty: 30 CAPSULE | Refills: 0 | Status: CANCELLED | OUTPATIENT
Start: 2022-03-22 | End: 2022-04-21

## 2022-03-22 RX ORDER — GABAPENTIN 600 MG/1
600 TABLET ORAL 2 TIMES DAILY
Qty: 180 TABLET | Refills: 3 | Status: ON HOLD | OUTPATIENT
Start: 2022-03-22 | End: 2023-04-30 | Stop reason: HOSPADM

## 2022-03-22 NOTE — TELEPHONE ENCOUNTER
No new care gaps identified.  Powered by [employer]. Reference number: [id number].   3/22/2022 4:18:17 PM CDT

## 2022-03-22 NOTE — TELEPHONE ENCOUNTER
lov 3/2022  In the refill comment pt is saying that she is supposed to be getting 600 mg bid. I looked through her chart and it doesn't state this anywhere in her chart notes and I also looked back in the historical med list for gabapentin and neurontin and I don't see 600 mg bid anywhere, only the 300 mg bid. Please advise.

## 2022-03-23 ENCOUNTER — PATIENT MESSAGE (OUTPATIENT)
Dept: PULMONOLOGY | Facility: CLINIC | Age: 62
End: 2022-03-23
Payer: COMMERCIAL

## 2022-03-23 ENCOUNTER — CLINICAL SUPPORT (OUTPATIENT)
Dept: DIABETES | Facility: CLINIC | Age: 62
End: 2022-03-23
Payer: COMMERCIAL

## 2022-03-23 DIAGNOSIS — E11.65 UNCONTROLLED TYPE 2 DIABETES MELLITUS WITH HYPERGLYCEMIA: ICD-10-CM

## 2022-03-23 PROCEDURE — G0108 PR DIAB MANAGE TRN  PER INDIV: ICD-10-PCS | Mod: S$GLB,,, | Performed by: INTERNAL MEDICINE

## 2022-03-23 PROCEDURE — 99999 PR PBB SHADOW E&M-EST. PATIENT-LVL II: CPT | Mod: PBBFAC,,,

## 2022-03-23 PROCEDURE — G0108 DIAB MANAGE TRN  PER INDIV: HCPCS | Mod: S$GLB,,, | Performed by: INTERNAL MEDICINE

## 2022-03-23 PROCEDURE — 99999 PR PBB SHADOW E&M-EST. PATIENT-LVL II: ICD-10-PCS | Mod: PBBFAC,,,

## 2022-03-23 NOTE — TELEPHONE ENCOUNTER
----- Message from Magdalena Vasquez sent at 3/23/2022  3:50 PM CDT -----  Regarding: self    .Type: Patient Call Back    Who called: self   What is the request in detail: patient states she thought the med was called in but pharm does not have it; DULoxetine (CYMBALTA) 20 MG capsule        [id number] [place] [address] [facility]  [address] [address]  Phone: [phone] Fax: [phone]        Can the clinic reply by [facility]? Call     Would the patient rather a call back or a response via [facility]? Call     Best call back number: .[phone]

## 2022-03-23 NOTE — PROGRESS NOTES
Diabetes Care Specialist Progress Note  Author: Maura Parra RD, CDE  Date: 3/25/2022    Program Intake  Reason for Diabetes Program Visit:: Initial Diabetes Assessment  Type of Intervention:: Individual  Individual: Education    Education:  (new DM dx)    Current diabetes risk level:: low    In the last 12 months, have you:: been admitted to a hospital  Was the ER or hospital admission related to diabetes?: No      Lab Results   Component Value Date    HGBA1C 6.6 (H) 03/10/2022         Clinical  Problem Review  Reviewed Problem List with Patient: yes  Active comorbidities affecting diabetes self-care.: no  Reviewed health maintenance: yes    Clinical Assessment  Current Diabetes Treatment: Oral Medication, Injectable  Ozempic  Metformin 1000 mg BID      Have you ever experienced hypoglycemia (low blood sugar)?: no  Have you ever experienced hyperglycemia (high blood sugar)?: no    SMBG once daily  Reports:  117, 127  Highest: 190        Medication Information  How many days a week do you miss your medications?: Never  Do you sometimes have difficulty refilling your medications?: No  Medication adherence impacting ability to self-manage diabetes?: No    Labs  Do you have regular lab work to monitor your medications?: Yes  Lab Compliance Barriers: No    Nutritional Status  Diet: Regular (3 meals daily (sometimes meal replacements) + small snacks)  Meal Plan 24 Hour Recall: Breakfast, Lunch, Dinner, Snack    Drinks: water, CLYDE, [place] Diet tea, diet coke  Meal Plan 24 Hour Recall - Breakfast: Eggs, protein (ham or lean meat)  Meal Plan 24 Hour Recall - Lunch: atkins bar  Meal Plan 24 Hour Recall - Dinner: Atkins bar or fish and veggie  Meal Plan 24 Hour Recall - Snack: Part of atkins bar, OR popcorn, cheese, meat    Change in appetite?: No  Recent Changes in Weight: No Recent Weight Change  Current nutritional status an area of need that is impacting patient's ability to self-manage diabetes?:  No                    Additional Social History  Support  Does anyone support you with your diabetes care?:  (pt is primary caregiver)  Who takes you to your medical appointments?: self  Does the current support meet the patient's needs?: Yes  Is Support an area impacting ability to self-manage diabetes?: No    Access to Mass Media & Technology  Media or technology needs impacting ability to self-manage diabetes?: No    Cognitive/Behavioral Health  Alert and Oriented: Yes  Difficulty Thinking: No  Requires Prompting: No  Cognitive or behavioral barriers impacting ability to self-manage diabetes?: No    Culture/Yarsanism  Culture or Sabianism beliefs that may impact ability to access healthcare: No    Communication  Language preference: English  Hearing Problems: No  Vision Problems: No    Health Literacy  Preferred Learning Method: Face to Face, Demonstration, Hands On, Reading Materials  How often do you need to have someone help you read instructions, pamphlets, or written material from your doctor or pharmacy?: Never  Health literacy needs impacting ability to self-manage diabetes?: No              Diabetes Self-Management Skills Assessment  Diabetes Disease Process/Treatment Options  Patient/caregiver able to state what happens when someone has diabetes.: yes  Patient/caregiver knows what type of diabetes they have.: yes  Diabetes Type : Type II  Patient/caregiver able to identify at least three signs and symptoms of diabetes.: yes  Patient able to identify at least three risk factors for diabetes.: yes  Diabetes Disease Process/Treatment Options: Skills Assessment Completed: Yes  Assessment indicates:: Adequate understanding  Area of need?: No    Nutrition/Healthy Eating  Method of carbohydrate measurement:: carb counting/reading labels  Patient can identify foods that impact blood sugar.: yes  Nutrition/Healthy Eating Skills Assessment Completed:: Yes  Assessment indicates:: Instruction Needed  Area of need?:  Yes    Physical Activity/Exercise  Patient formally exercises outside of work.: no  Patient can identify forms of physical activity.: yes  Patient can identify reasons why exercise/physical activity is important in diabetes management.: yes  Physical Activity/Exercise Skills Assessment Completed: : Yes  Assessment indicates:: Instruction Needed  Area of need?: Yes    Medications  Patient is able to describe current diabetes management routine.: yes  Diabetes management routine:: injectable medications, oral medications  Patient is able to identify current diabetes medications, dosages, and appropriate timing of medications.: yes  Patient understands the purpose of the medications taken for diabetes.: yes  Patient reports problems or concerns with current medication regimen.: no  Medication Skills Assessment Completed:: Yes  Assessment indicates:: Adequate understanding  Area of need?: No    Home Blood Glucose Monitoring  Patient states that blood sugar is checked at home daily.: yes  Monitoring Method:: home glucometer  How often do you check your blood sugar?: Once a day  Home Blood Glucose Monitoring Skills Assessment Completed: : Yes  Assessment indicates:: Adequate understanding  Area of need?: No    Acute Complications  Patient is able to identify types of acute complications: Yes  Patient Identified:: Hypoglycemia, Hyperglycemia  Acute Complications Skills Assessment Completed: : Yes  Assessment indicates:: Adequate understanding  Area of need?: No    Chronic Complications  Patient can identify major chronic complications of diabetes.: yes  Patient can identify ways to prevent or delay diabetes complications.: yes  Chronic Complications Skills Assessment Completed: : Yes  Assessment indicates:: Adequate understanding  Area of need?: No    Psychosocial/Coping  Patient can identify ways of coping with chronic disease.: yes  Psychosocial/Coping Skills Assessment Completed: : Yes  Area of need?:  No                Diabetes Self Support Plan     Assessment Summary and Plan    Based on today's diabetes care assessment, the following areas of need were identified:      Social 3/23/2022   Support No   Access to Mass Media/Tech No   Cognitive/Behavioral Health No   Culture/Orthodoxy No   Health Literacy No        Clinical 3/23/2022   Medication Adherence No   Lab Compliance No   Nutritional Status No        Diabetes Self-Management Skills 3/23/2022   Diabetes Disease Process/Treatment Options No   Nutrition/Healthy Eating Yes  See Care Plan     Physical Activity/Exercise Yes  -Discussed goals and benefits of regular physical activity.   -Reviewed difference between active lifestyle and structured physical activity.   -Encouraged physical activity with increased heart rate for sustained duration as tolerated.   -Reviewed physical activity goals of >150 minutes per week.     Medication No   Home Blood Glucose Monitoring No   Acute Complications No   Chronic Complications No   Psychosocial/Coping No      Today's interventions were provided through individual discussion, instruction, and written materials were provided.      Patient verbalized understanding of instruction and written materials.  Pt was able to return back demonstration of instructions today. Patient understood key points, needs reinforcement and further instruction.               Diabetes Self-Management Care Plan:    Today's Diabetes Self-Management Care Plan was developed with Earl's input. Earl has agreed to work toward the following goal(s) to improve his/her overall diabetes control.      Care Plan: Diabetes Management   Updates made since 2/23/2022 12:00 AM      Problem: Healthy Eating       Long-Range Goal: Eat 3 meals daily with 2-3 servings of Carbohydrate per meal.    Start Date: 3/23/2022   Expected End Date: 3/23/2023   Priority: High   Barriers: No Barriers Identified   Note:    -Emphasized importance of eliminating all sugar sweetened  beverages, including fruit juice. Discussed sugar free drink options.   -Discussed carb vs non-carb foods and instructed on appropriate amounts of carbs to have at meals and snacks.   -Recommended 30-45 gm carb at meals  -Recommended 0-15 gm carb at snacks  -Instructed/reviewed how to read nutrition label.   -Reviewed need to limit total/saturated fats despite lesser effect on BG increase.   -Encouraged carb sources primarily from whole grains, fresh/frozen fruits, and low-fat milk and yogurt.   -Discussed meal plans and snack ideas amenable to pt.        Task: Reviewed the sources and role of Carbohydrate, Protein, and Fat and how each nutrient impacts blood sugar. Completed 3/25/2022      Task: Provided visual examples using dry measuring cups, food models, and other familiar objects such as computer mouse, deck or cards, tennis ball etc. to help with visualization of portions. Completed 3/25/2022      Task: Explained how to count carbohydrates using the food label and the use of dry measuring cups for accurate carb counting. Completed 3/25/2022      Task: Discussed strategies for choosing healthier menu options when dining out. Completed 3/25/2022      Task: Recommended replacing beverages containing high sugar content with noncaloric/sugar free options and/or water. Completed 3/25/2022      Task: Review the importance of balancing carbohydrates with each meal using portion control techniques to count servings of carbohydrate and label reading to identify serving size and amount of total carbs per serving. Completed 3/25/2022      Task: Provided Sample plate method and reviewed the use of the plate to estimate amounts of carbohydrate per meal. Completed 3/25/2022                  Follow Up Plan     F/u in 3 months.        Today's care plan and follow up schedule was discussed with patient.  Earl verbalized understanding of the care plan, goals, and agrees to follow up plan.        The patient was encouraged to  communicate with his/her health care provider/physician and care team regarding his/her condition(s) and treatment.  I provided the patient with my contact information today and encouraged to contact me via phone or [facility] Patient Portal as needed.             Length of Visit   Total Time: 60 Minutes

## 2022-03-23 NOTE — TELEPHONE ENCOUNTER
Requested medication has been pended and routed to Dr. Rodriguez  for approval. LOV 3/11/22 Upcoming Visit 5/2/22

## 2022-03-24 RX ORDER — DULOXETIN HYDROCHLORIDE 30 MG/1
60 CAPSULE, DELAYED RELEASE ORAL DAILY
Qty: 90 CAPSULE | Refills: 1 | Status: CANCELLED | OUTPATIENT
Start: 2022-03-24

## 2022-03-24 RX ORDER — LEVOTHYROXINE SODIUM 50 UG/1
50 TABLET ORAL
Qty: 90 TABLET | Refills: 3 | Status: CANCELLED | OUTPATIENT
Start: 2022-03-24

## 2022-03-24 NOTE — TELEPHONE ENCOUNTER
Patient states that the medication Cymbalta was prescribe by pain management. Patient states that outpatient rehab increased her dosage from 60 mg to 100 mg to help with body pain/depression. Patient admits to not taking medication as prescribed she states she been  taking 160 mg and 220 mg of Cymbalta. Routed to Dr. Rodriguez to inform and for further instructions.

## 2022-03-24 NOTE — TELEPHONE ENCOUNTER
Please clarify Who has been prescribing this and managing this medication for pt? Does she currently have a psychiatrist prescribing his for her?

## 2022-03-25 ENCOUNTER — HOSPITAL ENCOUNTER (OUTPATIENT)
Dept: RADIOLOGY | Facility: HOSPITAL | Age: 62
Discharge: HOME | End: 2022-03-25
Attending: INTERNAL MEDICINE
Payer: COMMERCIAL

## 2022-03-25 DIAGNOSIS — R06.02 SHORTNESS OF BREATH: Primary | ICD-10-CM

## 2022-03-25 DIAGNOSIS — R91.8 MULTIPLE SUBSOLID LUNG NODULES GREATER THAN 6 MM IN DIAMETER: ICD-10-CM

## 2022-03-25 PROCEDURE — 71250 CT THORAX DX C-: CPT | Mod: TC

## 2022-03-25 PROCEDURE — 71250 CT CHEST WITHOUT CONTRAST: ICD-10-PCS | Mod: 26,,, | Performed by: RADIOLOGY

## 2022-03-25 PROCEDURE — 71250 CT THORAX DX C-: CPT | Mod: 26,,, | Performed by: RADIOLOGY

## 2022-03-25 RX ORDER — LEVOTHYROXINE SODIUM 50 UG/1
50 TABLET ORAL
Qty: 90 TABLET | Refills: 3 | Status: SHIPPED | OUTPATIENT
Start: 2022-03-25 | End: 2022-09-06 | Stop reason: SDUPTHER

## 2022-03-25 NOTE — TELEPHONE ENCOUNTER
Spoke with patient, informed her that I have received her message. Patient states that she wan ts to schedule her 6 minute walk test on Monday 3/28/22 for 11:00am. I verbalized to patient that I understand and advised patient that I will schedule appointment. Patient verbalized that she understand.

## 2022-03-25 NOTE — TELEPHONE ENCOUNTER
PT should contact the provider who is Rx and managing this medication. Not sure why this was ever sent to me day prior.

## 2022-03-28 ENCOUNTER — HOSPITAL ENCOUNTER (OUTPATIENT)
Dept: PULMONOLOGY | Facility: CLINIC | Age: 62
Discharge: HOME | End: 2022-03-28
Payer: COMMERCIAL

## 2022-03-28 VITALS — WEIGHT: 206 LBS | BODY MASS INDEX: 33.11 KG/M2 | HEIGHT: 66 IN

## 2022-03-28 DIAGNOSIS — R06.02 SHORTNESS OF BREATH: ICD-10-CM

## 2022-03-28 PROCEDURE — 94618 PULMONARY STRESS TESTING: ICD-10-PCS | Mod: S$GLB,,, | Performed by: INTERNAL MEDICINE

## 2022-03-28 PROCEDURE — 94618 PULMONARY STRESS TESTING: CPT | Mod: S$GLB,,, | Performed by: INTERNAL MEDICINE

## 2022-03-29 DIAGNOSIS — F41.8 DEPRESSION WITH ANXIETY: Primary | ICD-10-CM

## 2022-03-29 RX ORDER — DULOXETIN HYDROCHLORIDE 20 MG/1
40 CAPSULE, DELAYED RELEASE ORAL DAILY
Qty: 30 CAPSULE | Refills: 0 | OUTPATIENT
Start: 2022-03-29

## 2022-03-29 NOTE — PROCEDURES
Earl Abdul is a 61 y.o.  female patient, who presents for a 6 minute walk test ordered by MD Karen.  The diagnosis is Shortness of Breath.  The patient's BMI is 33.3 kg/m2.  Predicted distance (lower limit of normal) is 302.92 meters.      Test Results:    The test was not completed.  The patient stopped 1 times for a total of 103 seconds.  The total time walked was 247 seconds.  During walking, the patient reported:  Chest pain, Dyspnea, Leg pain, Lightheadedness, Other (Comment) (back pain). The patient used no assistive devices  during testing.     03/28/2022---------Distance: 243.84 meters (800 feet)     O2 Sat % Supplemental Oxygen Heart Rate Blood Pressure Anna Scale   Pre-exercise  (Resting) 94 % Room Air 114 bpm 114/56 mmHg 4   During Exercise 90 % Room Air 125 bpm 124/57 mmHg 4   Post-exercise  (Recovery) 92 % Room Air  112 bpm   mmHg       Recovery Time: 93 seconds    Performing nurse/tech: Artemio MERLOS      PREVIOUS STUDY:   The patient has not had a previous study.      CLINICAL INTERPRETATION:  Six minute walk distance is 243.84 meters (800 feet) with somewhat heavy dyspnea.  During exercise, there was significant desaturation while breathing room air.  Both blood pressure and heart rate remained stable with walking.  Tachycardia was present prior to exercise.  The patient reported non-pulmonary symptoms during exercise.  Significant exercise impairment is likely due to subjective symptoms.  The patient did not complete the study, walking 247 seconds of the 360 second test.  No previous study performed.  Based upon age and body mass index, exercise capacity is less than predicted.

## 2022-03-29 NOTE — TELEPHONE ENCOUNTER
Returned call to the patient. States she needs a refill of Cymbalta. Informed will give this message to the MD.LOV 03/11/2022      What is the request in detail: The patient is calling to speak to the nurse in regards to medication. Please advise

## 2022-03-29 NOTE — TELEPHONE ENCOUNTER
No new care gaps identified.  Powered by [email or web address]. Reference number: [id number].   3/29/2022 1:04:47 PM CDT

## 2022-03-30 NOTE — TELEPHONE ENCOUNTER
LVM for patient to return call to office. Please see message below for regards. Thanks. Sent portal message.

## 2022-03-31 ENCOUNTER — PATIENT MESSAGE (OUTPATIENT)
Dept: PULMONOLOGY | Facility: CLINIC | Age: 62
End: 2022-03-31
Payer: COMMERCIAL

## 2022-04-01 ENCOUNTER — TELEPHONE (OUTPATIENT)
Dept: PULMONOLOGY | Facility: HOSPITAL | Age: 62
End: 2022-04-01
Payer: COMMERCIAL

## 2022-04-01 NOTE — TELEPHONE ENCOUNTER
"Discussed results of chest CT. Axillary lymph nodes & nolvia hepatis lymph nodes were "prominent" nov 2020 & remain so. Pretracheal LN is 1cm which is upper limit of normal. Recommend against tissue sampling at this time. Recommend repeat CT in six months.    Pt also needs oxygen for upcoming flight to [place]. Will look for the rx form from her selected DME provider faxed to me next week.    Shaq Jones MD  [facility]  "

## 2022-04-06 NOTE — PROGRESS NOTES
Subjective:       Patient ID: Earl Abdul is a 61 y.o. female.    Chief Complaint: No chief complaint on file.    HPI 61-year-old female seen for F/U of right breast cancer..  She is a patient of Dr. Kevin.She was on letrozole but stopped that in May 2021  Last seen May 2021.      She was hospitalized with COVID in January - also treated for ETOH withdawal.    Has chronic back pain sees pain management.  Concerned about her hormone levels. Recent bloodwork (CBC,CMP,TSH) normal.  Has diabetes on insulin and metformin.    CT 3/25/22 -   The resolution of the prior described ground-glass nodules in the right middle and right upper lobes.     Interval development of small cluster pulmonary nodules within the superior lingular segment of the left upper lobe, perhaps leading to small airways disease.     Continued demonstration of prominent mediastinal, axillary, and nolvia hepatic lymph nodes.    History:  Mammogram on September 4, 2020 showed a focal asymmetry in the retroareolar region of the right breast.  Ultrasound at that time showed a 9 x 5 x 8 mm hypoechoic mass at 12:00 p.m..    Biopsy on September 29, 2020 showed grade 2 infiltrating ductal carcinoma (histologic grade 3, nuclear grade 2, mitotic index 2).  Tumor was 95% ER positive 20-30% MI positive and HER2 was 2+ but negative by FISH.  Ki-67 was 80%.    On October 29, 2020 bilateral mastectomy and right axillary sentinel lymph node biopsy was performed.  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  Margins were negative with closest margin 6 mm.  The sentinel lymph node was negative.    Final pathological staging T1b N0 stage IA.    Letrozole started in February 2021.        Review of Systems   Constitutional: Negative for activity change, appetite change, fever and unexpected weight change.   Respiratory: Negative for cough and shortness of breath.    Gastrointestinal: Negative for constipation and diarrhea.   Genitourinary:  Negative.    Neurological: Negative for headaches.   Psychiatric/Behavioral: Negative for dysphoric mood.         Objective:      Physical Exam  Vitals reviewed.   Constitutional:       General: She is not in acute distress.     Appearance: Normal appearance.   HENT:      Head: Normocephalic.   Eyes:      Pupils: Pupils are equal, round, and reactive to light.   Cardiovascular:      Rate and Rhythm: Normal rate and regular rhythm.   Pulmonary:      Effort: Pulmonary effort is normal. No respiratory distress.      Breath sounds: Normal breath sounds. No wheezing or rales.   Chest:   Breasts:      Right: No axillary adenopathy or supraclavicular adenopathy.      Left: No axillary adenopathy or supraclavicular adenopathy.         Abdominal:      Palpations: Abdomen is soft. There is no mass.      Tenderness: There is no abdominal tenderness.   Musculoskeletal:      Right lower leg: No edema.      Left lower leg: No edema.   Lymphadenopathy:      Cervical: No cervical adenopathy.      Upper Body:      Right upper body: No supraclavicular or axillary adenopathy.      Left upper body: No supraclavicular or axillary adenopathy.   Neurological:      Mental Status: She is alert and oriented to person, place, and time.   Psychiatric:         Mood and Affect: Mood normal.         Thought Content: Thought content normal.         Judgment: Judgment normal.         Assessment:       1. Malignant neoplasm of central portion of right breast in female, estrogen receptor positive        Plan:       Trial of anastrozole.Written information provided.    RTC 3 M.    Route Chart for Scheduling    Med Onc Chart Routing      Follow up with physician 3 months. Me or Nusrat   Follow up with TERESO    Labs    Imaging    Pharmacy appointment    Other referrals

## 2022-04-07 ENCOUNTER — OFFICE VISIT (OUTPATIENT)
Dept: HEMATOLOGY/ONCOLOGY | Facility: CLINIC | Age: 62
End: 2022-04-07
Payer: COMMERCIAL

## 2022-04-07 VITALS
DIASTOLIC BLOOD PRESSURE: 68 MMHG | RESPIRATION RATE: 18 BRPM | BODY MASS INDEX: 32.76 KG/M2 | HEIGHT: 66 IN | OXYGEN SATURATION: 95 % | TEMPERATURE: 98 F | HEART RATE: 101 BPM | WEIGHT: 203.81 LBS | SYSTOLIC BLOOD PRESSURE: 141 MMHG

## 2022-04-07 DIAGNOSIS — Z17.0 MALIGNANT NEOPLASM OF CENTRAL PORTION OF RIGHT BREAST IN FEMALE, ESTROGEN RECEPTOR POSITIVE: Primary | ICD-10-CM

## 2022-04-07 DIAGNOSIS — C50.111 MALIGNANT NEOPLASM OF CENTRAL PORTION OF RIGHT BREAST IN FEMALE, ESTROGEN RECEPTOR POSITIVE: Primary | ICD-10-CM

## 2022-04-07 PROCEDURE — 3044F PR MOST RECENT HEMOGLOBIN A1C LEVEL <7.0%: ICD-10-PCS | Mod: CPTII,S$GLB,, | Performed by: INTERNAL MEDICINE

## 2022-04-07 PROCEDURE — 3061F PR NEG MICROALBUMINURIA RESULT DOCUMENTED/REVIEW: ICD-10-PCS | Mod: CPTII,S$GLB,, | Performed by: INTERNAL MEDICINE

## 2022-04-07 PROCEDURE — 1159F MED LIST DOCD IN RCRD: CPT | Mod: CPTII,S$GLB,, | Performed by: INTERNAL MEDICINE

## 2022-04-07 PROCEDURE — 99214 PR OFFICE/OUTPT VISIT, EST, LEVL IV, 30-39 MIN: ICD-10-PCS | Mod: S$GLB,,, | Performed by: INTERNAL MEDICINE

## 2022-04-07 PROCEDURE — 99999 PR PBB SHADOW E&M-EST. PATIENT-LVL V: ICD-10-PCS | Mod: PBBFAC,,, | Performed by: INTERNAL MEDICINE

## 2022-04-07 PROCEDURE — 3008F PR BODY MASS INDEX (BMI) DOCUMENTED: ICD-10-PCS | Mod: CPTII,S$GLB,, | Performed by: INTERNAL MEDICINE

## 2022-04-07 PROCEDURE — 3078F PR MOST RECENT DIASTOLIC BLOOD PRESSURE < 80 MM HG: ICD-10-PCS | Mod: CPTII,S$GLB,, | Performed by: INTERNAL MEDICINE

## 2022-04-07 PROCEDURE — 4010F PR ACE/ARB THEARPY RXD/TAKEN: ICD-10-PCS | Mod: CPTII,S$GLB,, | Performed by: INTERNAL MEDICINE

## 2022-04-07 PROCEDURE — 3008F BODY MASS INDEX DOCD: CPT | Mod: CPTII,S$GLB,, | Performed by: INTERNAL MEDICINE

## 2022-04-07 PROCEDURE — 3066F NEPHROPATHY DOC TX: CPT | Mod: CPTII,S$GLB,, | Performed by: INTERNAL MEDICINE

## 2022-04-07 PROCEDURE — 99999 PR PBB SHADOW E&M-EST. PATIENT-LVL V: CPT | Mod: PBBFAC,,, | Performed by: INTERNAL MEDICINE

## 2022-04-07 PROCEDURE — 4010F ACE/ARB THERAPY RXD/TAKEN: CPT | Mod: CPTII,S$GLB,, | Performed by: INTERNAL MEDICINE

## 2022-04-07 PROCEDURE — 3044F HG A1C LEVEL LT 7.0%: CPT | Mod: CPTII,S$GLB,, | Performed by: INTERNAL MEDICINE

## 2022-04-07 PROCEDURE — 3066F PR DOCUMENTATION OF TREATMENT FOR NEPHROPATHY: ICD-10-PCS | Mod: CPTII,S$GLB,, | Performed by: INTERNAL MEDICINE

## 2022-04-07 PROCEDURE — 3077F PR MOST RECENT SYSTOLIC BLOOD PRESSURE >= 140 MM HG: ICD-10-PCS | Mod: CPTII,S$GLB,, | Performed by: INTERNAL MEDICINE

## 2022-04-07 PROCEDURE — 99214 OFFICE O/P EST MOD 30 MIN: CPT | Mod: S$GLB,,, | Performed by: INTERNAL MEDICINE

## 2022-04-07 PROCEDURE — 3077F SYST BP >= 140 MM HG: CPT | Mod: CPTII,S$GLB,, | Performed by: INTERNAL MEDICINE

## 2022-04-07 PROCEDURE — 3061F NEG MICROALBUMINURIA REV: CPT | Mod: CPTII,S$GLB,, | Performed by: INTERNAL MEDICINE

## 2022-04-07 PROCEDURE — 1159F PR MEDICATION LIST DOCUMENTED IN MEDICAL RECORD: ICD-10-PCS | Mod: CPTII,S$GLB,, | Performed by: INTERNAL MEDICINE

## 2022-04-07 PROCEDURE — 3078F DIAST BP <80 MM HG: CPT | Mod: CPTII,S$GLB,, | Performed by: INTERNAL MEDICINE

## 2022-04-09 ENCOUNTER — PATIENT MESSAGE (OUTPATIENT)
Dept: PULMONOLOGY | Facility: CLINIC | Age: 62
End: 2022-04-09
Payer: COMMERCIAL

## 2022-04-13 ENCOUNTER — PATIENT OUTREACH (OUTPATIENT)
Dept: ADMINISTRATIVE | Facility: OTHER | Age: 62
End: 2022-04-13
Payer: COMMERCIAL

## 2022-04-13 NOTE — PROGRESS NOTES
LINKS immunization registry updated  Care Everywhere updated  Health Maintenance updated  Chart reviewed for overdue Proactive [facility]coun[facility] (TIMOTHY) health maintenance testing (CRS, Breast Ca, Diabetic Eye Exam)   Orders entered:N/A

## 2022-04-18 ENCOUNTER — LAB VISIT (OUTPATIENT)
Dept: LAB | Facility: HOSPITAL | Age: 62
End: 2022-04-18
Attending: INTERNAL MEDICINE
Payer: COMMERCIAL

## 2022-04-18 ENCOUNTER — OFFICE VISIT (OUTPATIENT)
Dept: GASTROENTEROLOGY | Facility: CLINIC | Age: 62
End: 2022-04-18
Payer: COMMERCIAL

## 2022-04-18 VITALS
SYSTOLIC BLOOD PRESSURE: 126 MMHG | HEART RATE: 104 BPM | BODY MASS INDEX: 33.13 KG/M2 | WEIGHT: 206.13 LBS | HEIGHT: 66 IN | DIASTOLIC BLOOD PRESSURE: 83 MMHG

## 2022-04-18 DIAGNOSIS — R14.0 BLOATING: ICD-10-CM

## 2022-04-18 DIAGNOSIS — R19.7 DIARRHEA, UNSPECIFIED TYPE: Primary | ICD-10-CM

## 2022-04-18 DIAGNOSIS — R19.7 DIARRHEA, UNSPECIFIED TYPE: ICD-10-CM

## 2022-04-18 LAB
IGA SERPL-MCNC: 132 MG/DL (ref 40–350)
T4 FREE SERPL-MCNC: 0.9 NG/DL (ref 0.71–1.51)
TSH SERPL DL<=0.005 MIU/L-ACNC: 2.94 UIU/ML (ref 0.4–4)

## 2022-04-18 PROCEDURE — 3074F SYST BP LT 130 MM HG: CPT | Mod: CPTII,S$GLB,, | Performed by: INTERNAL MEDICINE

## 2022-04-18 PROCEDURE — 3008F PR BODY MASS INDEX (BMI) DOCUMENTED: ICD-10-PCS | Mod: CPTII,S$GLB,, | Performed by: INTERNAL MEDICINE

## 2022-04-18 PROCEDURE — 3044F HG A1C LEVEL LT 7.0%: CPT | Mod: CPTII,S$GLB,, | Performed by: INTERNAL MEDICINE

## 2022-04-18 PROCEDURE — 4010F PR ACE/ARB THEARPY RXD/TAKEN: ICD-10-PCS | Mod: CPTII,S$GLB,, | Performed by: INTERNAL MEDICINE

## 2022-04-18 PROCEDURE — 83516 IMMUNOASSAY NONANTIBODY: CPT | Performed by: INTERNAL MEDICINE

## 2022-04-18 PROCEDURE — 1159F MED LIST DOCD IN RCRD: CPT | Mod: CPTII,S$GLB,, | Performed by: INTERNAL MEDICINE

## 2022-04-18 PROCEDURE — 84439 ASSAY OF FREE THYROXINE: CPT | Performed by: INTERNAL MEDICINE

## 2022-04-18 PROCEDURE — 3044F PR MOST RECENT HEMOGLOBIN A1C LEVEL <7.0%: ICD-10-PCS | Mod: CPTII,S$GLB,, | Performed by: INTERNAL MEDICINE

## 2022-04-18 PROCEDURE — 3074F PR MOST RECENT SYSTOLIC BLOOD PRESSURE < 130 MM HG: ICD-10-PCS | Mod: CPTII,S$GLB,, | Performed by: INTERNAL MEDICINE

## 2022-04-18 PROCEDURE — 3061F NEG MICROALBUMINURIA REV: CPT | Mod: CPTII,S$GLB,, | Performed by: INTERNAL MEDICINE

## 2022-04-18 PROCEDURE — 82784 ASSAY IGA/IGD/IGG/IGM EACH: CPT | Performed by: INTERNAL MEDICINE

## 2022-04-18 PROCEDURE — 3079F PR MOST RECENT DIASTOLIC BLOOD PRESSURE 80-89 MM HG: ICD-10-PCS | Mod: CPTII,S$GLB,, | Performed by: INTERNAL MEDICINE

## 2022-04-18 PROCEDURE — 3061F PR NEG MICROALBUMINURIA RESULT DOCUMENTED/REVIEW: ICD-10-PCS | Mod: CPTII,S$GLB,, | Performed by: INTERNAL MEDICINE

## 2022-04-18 PROCEDURE — 3008F BODY MASS INDEX DOCD: CPT | Mod: CPTII,S$GLB,, | Performed by: INTERNAL MEDICINE

## 2022-04-18 PROCEDURE — 99214 PR OFFICE/OUTPT VISIT, EST, LEVL IV, 30-39 MIN: ICD-10-PCS | Mod: S$GLB,,, | Performed by: INTERNAL MEDICINE

## 2022-04-18 PROCEDURE — 99999 PR PBB SHADOW E&M-EST. PATIENT-LVL V: CPT | Mod: PBBFAC,,, | Performed by: INTERNAL MEDICINE

## 2022-04-18 PROCEDURE — 3066F NEPHROPATHY DOC TX: CPT | Mod: CPTII,S$GLB,, | Performed by: INTERNAL MEDICINE

## 2022-04-18 PROCEDURE — 3079F DIAST BP 80-89 MM HG: CPT | Mod: CPTII,S$GLB,, | Performed by: INTERNAL MEDICINE

## 2022-04-18 PROCEDURE — 36415 COLL VENOUS BLD VENIPUNCTURE: CPT | Performed by: INTERNAL MEDICINE

## 2022-04-18 PROCEDURE — 3066F PR DOCUMENTATION OF TREATMENT FOR NEPHROPATHY: ICD-10-PCS | Mod: CPTII,S$GLB,, | Performed by: INTERNAL MEDICINE

## 2022-04-18 PROCEDURE — 84443 ASSAY THYROID STIM HORMONE: CPT | Performed by: INTERNAL MEDICINE

## 2022-04-18 PROCEDURE — 1159F PR MEDICATION LIST DOCUMENTED IN MEDICAL RECORD: ICD-10-PCS | Mod: CPTII,S$GLB,, | Performed by: INTERNAL MEDICINE

## 2022-04-18 PROCEDURE — 99999 PR PBB SHADOW E&M-EST. PATIENT-LVL V: ICD-10-PCS | Mod: PBBFAC,,, | Performed by: INTERNAL MEDICINE

## 2022-04-18 PROCEDURE — 4010F ACE/ARB THERAPY RXD/TAKEN: CPT | Mod: CPTII,S$GLB,, | Performed by: INTERNAL MEDICINE

## 2022-04-18 PROCEDURE — 99214 OFFICE O/P EST MOD 30 MIN: CPT | Mod: S$GLB,,, | Performed by: INTERNAL MEDICINE
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
## 2022-04-19 ENCOUNTER — PATIENT MESSAGE (OUTPATIENT)
Dept: INTERNAL MEDICINE | Facility: CLINIC | Age: 62
End: 2022-04-19
Payer: COMMERCIAL

## 2022-04-19 ENCOUNTER — PATIENT MESSAGE (OUTPATIENT)
Dept: HEMATOLOGY/ONCOLOGY | Facility: CLINIC | Age: 62
End: 2022-04-19
Payer: COMMERCIAL

## 2022-04-19 DIAGNOSIS — Z17.0 MALIGNANT NEOPLASM OF CENTRAL PORTION OF RIGHT BREAST IN FEMALE, ESTROGEN RECEPTOR POSITIVE: Primary | ICD-10-CM

## 2022-04-19 DIAGNOSIS — C50.111 MALIGNANT NEOPLASM OF CENTRAL PORTION OF RIGHT BREAST IN FEMALE, ESTROGEN RECEPTOR POSITIVE: Primary | ICD-10-CM

## 2022-04-19 RX ORDER — ANASTROZOLE 1 MG/1
1 TABLET ORAL DAILY
Qty: 30 TABLET | Refills: 11 | Status: SHIPPED | OUTPATIENT
Start: 2022-04-19 | End: 2022-06-30 | Stop reason: SDUPTHER

## 2022-04-20 ENCOUNTER — PATIENT MESSAGE (OUTPATIENT)
Dept: INTERNAL MEDICINE | Facility: CLINIC | Age: 62
End: 2022-04-20
Payer: COMMERCIAL

## 2022-04-21 ENCOUNTER — TELEPHONE (OUTPATIENT)
Dept: GASTROENTEROLOGY | Facility: CLINIC | Age: 62
End: 2022-04-21
Payer: COMMERCIAL

## 2022-04-21 LAB — TTG IGA SER-ACNC: 5 UNITS

## 2022-04-21 NOTE — TELEPHONE ENCOUNTER
----- Message from Fei Acevedo MD sent at 4/21/2022 12:55 PM CDT -----  Labs including for Celiac disease is fine.

## 2022-04-22 ENCOUNTER — OFFICE VISIT (OUTPATIENT)
Dept: INTERNAL MEDICINE | Facility: CLINIC | Age: 62
End: 2022-04-22
Attending: INTERNAL MEDICINE
Payer: COMMERCIAL

## 2022-04-22 ENCOUNTER — IMMUNIZATION (OUTPATIENT)
Dept: INTERNAL MEDICINE | Facility: CLINIC | Age: 62
End: 2022-04-22
Payer: COMMERCIAL

## 2022-04-22 VITALS
WEIGHT: 206.13 LBS | HEIGHT: 66 IN | BODY MASS INDEX: 33.13 KG/M2 | SYSTOLIC BLOOD PRESSURE: 129 MMHG | DIASTOLIC BLOOD PRESSURE: 73 MMHG | HEART RATE: 87 BPM | OXYGEN SATURATION: 98 %

## 2022-04-22 DIAGNOSIS — G47.00 INSOMNIA, UNSPECIFIED TYPE: Primary | ICD-10-CM

## 2022-04-22 DIAGNOSIS — G47.33 OSA (OBSTRUCTIVE SLEEP APNEA): ICD-10-CM

## 2022-04-22 DIAGNOSIS — F10.20 ALCOHOL USE DISORDER, SEVERE, DEPENDENCE: ICD-10-CM

## 2022-04-22 DIAGNOSIS — Z23 NEED FOR VACCINATION: Primary | ICD-10-CM

## 2022-04-22 PROCEDURE — 3074F SYST BP LT 130 MM HG: CPT | Mod: CPTII,95,, | Performed by: INTERNAL MEDICINE

## 2022-04-22 PROCEDURE — 3008F PR BODY MASS INDEX (BMI) DOCUMENTED: ICD-10-PCS | Mod: CPTII,95,, | Performed by: INTERNAL MEDICINE

## 2022-04-22 PROCEDURE — 1159F PR MEDICATION LIST DOCUMENTED IN MEDICAL RECORD: ICD-10-PCS | Mod: CPTII,95,, | Performed by: INTERNAL MEDICINE

## 2022-04-22 PROCEDURE — 4010F ACE/ARB THERAPY RXD/TAKEN: CPT | Mod: CPTII,95,, | Performed by: INTERNAL MEDICINE

## 2022-04-22 PROCEDURE — 99213 PR OFFICE/OUTPT VISIT, EST, LEVL III, 20-29 MIN: ICD-10-PCS | Mod: 95,,, | Performed by: INTERNAL MEDICINE

## 2022-04-22 PROCEDURE — 3078F PR MOST RECENT DIASTOLIC BLOOD PRESSURE < 80 MM HG: ICD-10-PCS | Mod: CPTII,95,, | Performed by: INTERNAL MEDICINE

## 2022-04-22 PROCEDURE — 3008F BODY MASS INDEX DOCD: CPT | Mod: CPTII,95,, | Performed by: INTERNAL MEDICINE

## 2022-04-22 PROCEDURE — 91305 COVID-19, MRNA, LNP-S, PF, 30 MCG/0.3 ML DOSE VACCINE (PFIZER): CPT | Mod: PBBFAC | Performed by: INTERNAL MEDICINE

## 2022-04-22 PROCEDURE — 3061F PR NEG MICROALBUMINURIA RESULT DOCUMENTED/REVIEW: ICD-10-PCS | Mod: CPTII,95,, | Performed by: INTERNAL MEDICINE

## 2022-04-22 PROCEDURE — 3074F PR MOST RECENT SYSTOLIC BLOOD PRESSURE < 130 MM HG: ICD-10-PCS | Mod: CPTII,95,, | Performed by: INTERNAL MEDICINE

## 2022-04-22 PROCEDURE — 3066F NEPHROPATHY DOC TX: CPT | Mod: CPTII,95,, | Performed by: INTERNAL MEDICINE

## 2022-04-22 PROCEDURE — 3066F PR DOCUMENTATION OF TREATMENT FOR NEPHROPATHY: ICD-10-PCS | Mod: CPTII,95,, | Performed by: INTERNAL MEDICINE

## 2022-04-22 PROCEDURE — 3044F PR MOST RECENT HEMOGLOBIN A1C LEVEL <7.0%: ICD-10-PCS | Mod: CPTII,95,, | Performed by: INTERNAL MEDICINE

## 2022-04-22 PROCEDURE — 4010F PR ACE/ARB THEARPY RXD/TAKEN: ICD-10-PCS | Mod: CPTII,95,, | Performed by: INTERNAL MEDICINE

## 2022-04-22 PROCEDURE — 3044F HG A1C LEVEL LT 7.0%: CPT | Mod: CPTII,95,, | Performed by: INTERNAL MEDICINE

## 2022-04-22 PROCEDURE — 1159F MED LIST DOCD IN RCRD: CPT | Mod: CPTII,95,, | Performed by: INTERNAL MEDICINE

## 2022-04-22 PROCEDURE — 99213 OFFICE O/P EST LOW 20 MIN: CPT | Mod: 95,,, | Performed by: INTERNAL MEDICINE

## 2022-04-22 PROCEDURE — 3061F NEG MICROALBUMINURIA REV: CPT | Mod: CPTII,95,, | Performed by: INTERNAL MEDICINE

## 2022-04-22 PROCEDURE — 3078F DIAST BP <80 MM HG: CPT | Mod: CPTII,95,, | Performed by: INTERNAL MEDICINE

## 2022-04-22 RX ORDER — TEMAZEPAM 15 MG/1
15 CAPSULE ORAL NIGHTLY PRN
Qty: 30 CAPSULE | Refills: 1 | Status: ON HOLD | OUTPATIENT
Start: 2022-04-22 | End: 2022-05-04 | Stop reason: HOSPADM

## 2022-04-22 RX ORDER — ONDANSETRON 4 MG/1
TABLET, ORALLY DISINTEGRATING ORAL
Status: ON HOLD | COMMUNITY
End: 2022-05-04 | Stop reason: HOSPADM

## 2022-04-22 RX ORDER — CHLORZOXAZONE 500 MG/1
500 TABLET ORAL 2 TIMES DAILY PRN
Status: ON HOLD | COMMUNITY
Start: 2022-04-07 | End: 2022-05-04 | Stop reason: HOSPADM

## 2022-04-22 RX ORDER — ONDANSETRON 4 MG/1
4 TABLET, FILM COATED ORAL EVERY 8 HOURS PRN
Status: ON HOLD | COMMUNITY
Start: 2022-02-07 | End: 2022-05-04 | Stop reason: HOSPADM

## 2022-04-22 NOTE — TELEPHONE ENCOUNTER
Pharmacist told me rOPINIRole (REQUIP) 0.25 MG tablet was last filled in 2020 by Dr. Blu Garcia of [facility])

## 2022-04-22 NOTE — PROGRESS NOTES
"Subjective:       Patient ID: Earl Abdul is a 61 y.o. female.    Chief Complaint: discuss insomnia medication    The patient location is: [facility]   The chief complaint leading to consultation is: insomnia  Visit type: audiovisual  Total time spent with patient: 20  Minutes  visit performed on virtual visit due to current risk associated with COVID 19 pandemic  Each patient to whom he or she provides medical services by telemedicine is:  (1) informed of the relationship between the physician and patient and the respective role of any other health care provider with respect to management of the patient; and (2) notified that he or she may decline to receive medical services by telemedicine and may withdraw from such care at any time.      Trazodone 400mg ineffective for insomnia. Overdose risk discussed. Polypharmacy discussed. Concomitant VINICIO discussed.       Review of Systems   Constitutional: Positive for activity change. Negative for unexpected weight change.   HENT: Negative for hearing loss, rhinorrhea and trouble swallowing.    Eyes: Positive for discharge. Negative for visual disturbance.   Respiratory: Negative for chest tightness and wheezing.    Cardiovascular: Negative for chest pain and palpitations.   Gastrointestinal: Negative for blood in stool, constipation, diarrhea and vomiting.   Endocrine: Negative for polydipsia and polyuria.   Genitourinary: Negative for difficulty urinating, dysuria, hematuria and menstrual problem.   Musculoskeletal: Negative for arthralgias, joint swelling and neck pain.   Neurological: Positive for weakness. Negative for headaches.   Psychiatric/Behavioral: Positive for confusion and dysphoric mood.       Objective:      Vitals:    04/22/22 0819   BP: 129/73   Pulse: 87   SpO2: 98%   Weight: 93.5 kg (206 lb 2.1 oz)   Height: 5' 6" (1.676 m)      Physical Exam  Constitutional:       General: She is not in acute distress.     Appearance: Normal appearance. She " is well-developed. She is not diaphoretic.   HENT:      Head: Atraumatic.   Eyes:      General: No scleral icterus.        Right eye: No discharge.         Left eye: No discharge.      Conjunctiva/sclera: Conjunctivae normal.   Neck:      Thyroid: No thyromegaly.   Pulmonary:      Effort: Pulmonary effort is normal. No tachypnea, accessory muscle usage or respiratory distress.      Breath sounds: Normal breath sounds.   Skin:     Coloration: Skin is not pale.   Neurological:      Mental Status: She is alert and oriented to person, place, and time.   Psychiatric:         Speech: Speech normal.         Behavior: Behavior normal.         Assessment:       1. Insomnia, unspecified type    2. VINICIO (obstructive sleep apnea)    3. Alcohol use disorder, severe, dependence        Plan:       Earl was seen today for discuss insomnia medication.    Diagnoses and all orders for this visit:    Insomnia, unspecified type  -     START temazepam (RESTORIL) 15 mg Cap; Take 1 capsule (15 mg total) by mouth nightly as needed (insomnia).    Overdose risk discussed. Polypharmacy discussed. Concomitant VINICIO discussed.   VINICIO (obstructive sleep apnea)    Alcohol use disorder, severe, dependence                 Andrew Chase MD  Internal Medicine-[facility]        Side effects of medication(s) were discussed in detail and patient voiced understanding.  Patient will call back for any issues or complications.

## 2022-04-25 ENCOUNTER — LAB VISIT (OUTPATIENT)
Dept: LAB | Facility: HOSPITAL | Age: 62
End: 2022-04-25
Attending: INTERNAL MEDICINE
Payer: COMMERCIAL

## 2022-04-25 DIAGNOSIS — R14.0 BLOATING: ICD-10-CM

## 2022-04-25 DIAGNOSIS — R19.7 DIARRHEA, UNSPECIFIED TYPE: ICD-10-CM

## 2022-04-25 PROCEDURE — 82656 EL-1 FECAL QUAL/SEMIQ: CPT | Performed by: INTERNAL MEDICINE

## 2022-04-27 ENCOUNTER — PATIENT MESSAGE (OUTPATIENT)
Dept: UROGYNECOLOGY | Facility: CLINIC | Age: 62
End: 2022-04-27
Payer: COMMERCIAL

## 2022-04-27 ENCOUNTER — TELEPHONE (OUTPATIENT)
Dept: UROLOGY | Facility: CLINIC | Age: 62
End: 2022-04-27
Payer: COMMERCIAL

## 2022-04-27 ENCOUNTER — PATIENT MESSAGE (OUTPATIENT)
Dept: GASTROENTEROLOGY | Facility: CLINIC | Age: 62
End: 2022-04-27
Payer: COMMERCIAL

## 2022-04-27 ENCOUNTER — PATIENT MESSAGE (OUTPATIENT)
Dept: INTERNAL MEDICINE | Facility: CLINIC | Age: 62
End: 2022-04-27
Payer: COMMERCIAL

## 2022-04-27 ENCOUNTER — PATIENT MESSAGE (OUTPATIENT)
Dept: PULMONOLOGY | Facility: CLINIC | Age: 62
End: 2022-04-27
Payer: COMMERCIAL

## 2022-04-27 DIAGNOSIS — G47.00 INSOMNIA, UNSPECIFIED TYPE: ICD-10-CM

## 2022-04-27 RX ORDER — TRAZODONE HYDROCHLORIDE 100 MG/1
100 TABLET ORAL NIGHTLY PRN
Qty: 90 TABLET | Refills: 0 | Status: ON HOLD | OUTPATIENT
Start: 2022-04-27 | End: 2022-05-04 | Stop reason: HOSPADM

## 2022-04-27 RX ORDER — ROPINIROLE 0.25 MG/1
0.25 TABLET, FILM COATED ORAL NIGHTLY
Qty: 30 TABLET | Refills: 1 | OUTPATIENT
Start: 2022-04-27

## 2022-04-27 NOTE — TELEPHONE ENCOUNTER
Called and spoke to this patient. States she did not go to ER,but she wants to speak to Dr. Rodriguez right now as she is ill due to him changing her sleep medication and taking her off another long term medication. Advised that she  go to ER as he was in clinic seeing patient's, but that I would send this message to him.Will make MA aware as well              Patient called looking for Dr. Rodriguez's nurse- explained I was not in that office. Patient reports 8/10 abdominal pain, nausea and dizziness and thinks she is going through withdrawals from a change in her medication. Patient reports she is going to the ER now and will call their office.

## 2022-04-27 NOTE — TELEPHONE ENCOUNTER
Will send in refill for trazodone to quell any concerns for withdrawal.  Once we settle this as current symptoms may not be related to withdrawal then we will work on sleep concerns.  If current gastrointestinal symptoms do not improve then needs evaluation

## 2022-04-27 NOTE — TELEPHONE ENCOUNTER
Patient called looking for Dr. Rodriguez's nurse- explained I was not in that office. Patient reports 8/10 abdominal pain, nausea and dizziness and thinks she is going through withdrawals from a change in her medication. Patient reports she is going to the ER now and will call their office.

## 2022-04-27 NOTE — TELEPHONE ENCOUNTER
No new care gaps identified.  Powered by [employer]. Reference number: [id number].   4/27/2022 10:53:26 AM CDT

## 2022-04-28 ENCOUNTER — PATIENT MESSAGE (OUTPATIENT)
Dept: INTERNAL MEDICINE | Facility: CLINIC | Age: 62
End: 2022-04-28
Payer: COMMERCIAL

## 2022-04-29 ENCOUNTER — HOSPITAL ENCOUNTER (INPATIENT)
Facility: OTHER | Age: 62
LOS: 5 days | Discharge: HOME HEALTH | DRG: 897 | End: 2022-05-04
Attending: EMERGENCY MEDICINE | Admitting: EMERGENCY MEDICINE
Payer: COMMERCIAL

## 2022-04-29 DIAGNOSIS — R07.9 CHEST PAIN: ICD-10-CM

## 2022-04-29 DIAGNOSIS — F10.939 ALCOHOL WITHDRAWAL: ICD-10-CM

## 2022-04-29 DIAGNOSIS — R11.2 INTRACTABLE VOMITING WITH NAUSEA, UNSPECIFIED VOMITING TYPE: ICD-10-CM

## 2022-04-29 DIAGNOSIS — B96.20 E COLI BACTEREMIA: Primary | ICD-10-CM

## 2022-04-29 DIAGNOSIS — E87.29 ALCOHOLIC KETOACIDOSIS: ICD-10-CM

## 2022-04-29 DIAGNOSIS — R78.81 E COLI BACTEREMIA: Primary | ICD-10-CM

## 2022-04-29 DIAGNOSIS — Z91.89 AT RISK FOR PROLONGED QT INTERVAL SYNDROME: ICD-10-CM

## 2022-04-29 DIAGNOSIS — E16.2 HYPOGLYCEMIA: ICD-10-CM

## 2022-04-29 DIAGNOSIS — R55 SYNCOPE: ICD-10-CM

## 2022-04-29 LAB
ALBUMIN SERPL BCP-MCNC: 4.1 G/DL (ref 3.5–5.2)
ALP SERPL-CCNC: 63 U/L (ref 55–135)
ALT SERPL W/O P-5'-P-CCNC: 41 U/L (ref 10–44)
AMPHET+METHAMPHET UR QL: NEGATIVE
ANION GAP SERPL CALC-SCNC: 22 MMOL/L (ref 8–16)
AST SERPL-CCNC: 42 U/L (ref 10–40)
B-OH-BUTYR BLD STRIP-SCNC: 4.4 MMOL/L (ref 0–0.5)
BACTERIA #/AREA URNS HPF: ABNORMAL /HPF
BARBITURATES UR QL SCN>200 NG/ML: NEGATIVE
BASOPHILS # BLD AUTO: 0.01 K/UL (ref 0–0.2)
BASOPHILS NFR BLD: 0.1 % (ref 0–1.9)
BENZODIAZ UR QL SCN>200 NG/ML: NEGATIVE
BILIRUB SERPL-MCNC: 0.8 MG/DL (ref 0.1–1)
BILIRUB UR QL STRIP: NEGATIVE
BUN SERPL-MCNC: 13 MG/DL (ref 8–23)
BZE UR QL SCN: NEGATIVE
CALCIUM SERPL-MCNC: 9.7 MG/DL (ref 8.7–10.5)
CANNABINOIDS UR QL SCN: ABNORMAL
CHLORIDE SERPL-SCNC: 98 MMOL/L (ref 95–110)
CLARITY UR: CLEAR
CO2 SERPL-SCNC: 20 MMOL/L (ref 23–29)
COLOR UR: YELLOW
CREAT SERPL-MCNC: 0.8 MG/DL (ref 0.5–1.4)
CREAT UR-MCNC: 31.9 MG/DL (ref 15–325)
CTP QC/QA: YES
DIFFERENTIAL METHOD: ABNORMAL
ELASTASE 1, FECAL: >500 MCG/G
EOSINOPHIL # BLD AUTO: 0 K/UL (ref 0–0.5)
EOSINOPHIL NFR BLD: 0.1 % (ref 0–8)
ERYTHROCYTE [DISTWIDTH] IN BLOOD BY AUTOMATED COUNT: 19.3 % (ref 11.5–14.5)
EST. GFR  (AFRICAN AMERICAN): >60 ML/MIN/1.73 M^2
EST. GFR  (NON AFRICAN AMERICAN): >60 ML/MIN/1.73 M^2
ETHANOL SERPL-MCNC: <10 MG/DL
ETHANOL UR-MCNC: 100 MG/DL
GLUCOSE SERPL-MCNC: 69 MG/DL (ref 70–110)
GLUCOSE UR QL STRIP: ABNORMAL
HCT VFR BLD AUTO: 39.1 % (ref 37–48.5)
HCV AB SERPL QL IA: NEGATIVE
HGB BLD-MCNC: 12.1 G/DL (ref 12–16)
HGB UR QL STRIP: ABNORMAL
HIV 1+2 AB+HIV1 P24 AG SERPL QL IA: NEGATIVE
IMM GRANULOCYTES # BLD AUTO: 0.04 K/UL (ref 0–0.04)
IMM GRANULOCYTES NFR BLD AUTO: 0.5 % (ref 0–0.5)
KETONES UR QL STRIP: ABNORMAL
LACTATE SERPL-SCNC: 1 MMOL/L (ref 0.5–2.2)
LEUKOCYTE ESTERASE UR QL STRIP: ABNORMAL
LIPASE SERPL-CCNC: 6 U/L (ref 4–60)
LYMPHOCYTES # BLD AUTO: 3.4 K/UL (ref 1–4.8)
LYMPHOCYTES NFR BLD: 40.5 % (ref 18–48)
MAGNESIUM SERPL-MCNC: 1.8 MG/DL (ref 1.6–2.6)
MCH RBC QN AUTO: 28.5 PG (ref 27–31)
MCHC RBC AUTO-ENTMCNC: 30.9 G/DL (ref 32–36)
MCV RBC AUTO: 92 FL (ref 82–98)
METHADONE UR QL SCN>300 NG/ML: NEGATIVE
MICROSCOPIC COMMENT: ABNORMAL
MONOCYTES # BLD AUTO: 0.6 K/UL (ref 0.3–1)
MONOCYTES NFR BLD: 7 % (ref 4–15)
NEUTROPHILS # BLD AUTO: 4.4 K/UL (ref 1.8–7.7)
NEUTROPHILS NFR BLD: 51.8 % (ref 38–73)
NITRITE UR QL STRIP: POSITIVE
NRBC BLD-RTO: 0 /100 WBC
OPIATES UR QL SCN: NEGATIVE
PCP UR QL SCN>25 NG/ML: NEGATIVE
PH UR STRIP: 6 [PH] (ref 5–8)
PHOSPHATE SERPL-MCNC: 3.6 MG/DL (ref 2.7–4.5)
PLATELET # BLD AUTO: 108 K/UL (ref 150–450)
PMV BLD AUTO: 9.5 FL (ref 9.2–12.9)
POCT GLUCOSE: 322 MG/DL (ref 70–110)
POCT GLUCOSE: 71 MG/DL (ref 70–110)
POCT GLUCOSE: 78 MG/DL (ref 70–110)
POCT GLUCOSE: 99 MG/DL (ref 70–110)
POTASSIUM SERPL-SCNC: 4.3 MMOL/L (ref 3.5–5.1)
PROCALCITONIN SERPL IA-MCNC: 0.11 NG/ML
PROT SERPL-MCNC: 7.6 G/DL (ref 6–8.4)
PROT UR QL STRIP: NEGATIVE
RBC # BLD AUTO: 4.25 M/UL (ref 4–5.4)
RBC #/AREA URNS HPF: 3 /HPF (ref 0–4)
SARS-COV-2 RDRP RESP QL NAA+PROBE: NEGATIVE
SODIUM SERPL-SCNC: 140 MMOL/L (ref 136–145)
SP GR UR STRIP: 1.01 (ref 1–1.03)
SQUAMOUS #/AREA URNS HPF: 3 /HPF
TOXICOLOGY INFORMATION: ABNORMAL
TSH SERPL DL<=0.005 MIU/L-ACNC: 0.62 UIU/ML (ref 0.4–4)
URN SPEC COLLECT METH UR: ABNORMAL
UROBILINOGEN UR STRIP-ACNC: NEGATIVE EU/DL
WBC # BLD AUTO: 8.43 K/UL (ref 3.9–12.7)
WBC #/AREA URNS HPF: 26 /HPF (ref 0–5)
YEAST URNS QL MICRO: ABNORMAL

## 2022-04-29 PROCEDURE — 96361 HYDRATE IV INFUSION ADD-ON: CPT

## 2022-04-29 PROCEDURE — 96375 TX/PRO/DX INJ NEW DRUG ADDON: CPT

## 2022-04-29 PROCEDURE — 87088 URINE BACTERIA CULTURE: CPT | Performed by: EMERGENCY MEDICINE

## 2022-04-29 PROCEDURE — 84145 PROCALCITONIN (PCT): CPT | Performed by: INTERNAL MEDICINE

## 2022-04-29 PROCEDURE — 86803 HEPATITIS C AB TEST: CPT | Performed by: EMERGENCY MEDICINE

## 2022-04-29 PROCEDURE — 83735 ASSAY OF MAGNESIUM: CPT | Performed by: EMERGENCY MEDICINE

## 2022-04-29 PROCEDURE — 99223 1ST HOSP IP/OBS HIGH 75: CPT | Mod: ,,, | Performed by: INTERNAL MEDICINE

## 2022-04-29 PROCEDURE — 87427 SHIGA-LIKE TOXIN AG IA: CPT | Mod: 59 | Performed by: INTERNAL MEDICINE

## 2022-04-29 PROCEDURE — 93010 EKG 12-LEAD: ICD-10-PCS | Mod: ,,, | Performed by: INTERNAL MEDICINE

## 2022-04-29 PROCEDURE — 80307 DRUG TEST PRSMV CHEM ANLYZR: CPT | Performed by: INTERNAL MEDICINE

## 2022-04-29 PROCEDURE — 83605 ASSAY OF LACTIC ACID: CPT | Performed by: INTERNAL MEDICINE

## 2022-04-29 PROCEDURE — 63600175 PHARM REV CODE 636 W HCPCS: Performed by: EMERGENCY MEDICINE

## 2022-04-29 PROCEDURE — 82077 ASSAY SPEC XCP UR&BREATH IA: CPT | Performed by: INTERNAL MEDICINE

## 2022-04-29 PROCEDURE — 87077 CULTURE AEROBIC IDENTIFY: CPT | Performed by: EMERGENCY MEDICINE

## 2022-04-29 PROCEDURE — 36415 COLL VENOUS BLD VENIPUNCTURE: CPT | Performed by: INTERNAL MEDICINE

## 2022-04-29 PROCEDURE — 87449 NOS EACH ORGANISM AG IA: CPT | Performed by: INTERNAL MEDICINE

## 2022-04-29 PROCEDURE — 99223 PR INITIAL HOSPITAL CARE,LEVL III: ICD-10-PCS | Mod: ,,, | Performed by: INTERNAL MEDICINE

## 2022-04-29 PROCEDURE — 84100 ASSAY OF PHOSPHORUS: CPT | Performed by: EMERGENCY MEDICINE

## 2022-04-29 PROCEDURE — 87046 STOOL CULTR AEROBIC BACT EA: CPT | Performed by: INTERNAL MEDICINE

## 2022-04-29 PROCEDURE — 25000003 PHARM REV CODE 250: Performed by: EMERGENCY MEDICINE

## 2022-04-29 PROCEDURE — 81000 URINALYSIS NONAUTO W/SCOPE: CPT | Performed by: EMERGENCY MEDICINE

## 2022-04-29 PROCEDURE — 63600175 PHARM REV CODE 636 W HCPCS: Performed by: INTERNAL MEDICINE

## 2022-04-29 PROCEDURE — 87077 CULTURE AEROBIC IDENTIFY: CPT | Mod: 59 | Performed by: INTERNAL MEDICINE

## 2022-04-29 PROCEDURE — 96374 THER/PROPH/DIAG INJ IV PUSH: CPT

## 2022-04-29 PROCEDURE — 82010 KETONE BODYS QUAN: CPT | Performed by: EMERGENCY MEDICINE

## 2022-04-29 PROCEDURE — 84443 ASSAY THYROID STIM HORMONE: CPT | Performed by: EMERGENCY MEDICINE

## 2022-04-29 PROCEDURE — 99285 EMERGENCY DEPT VISIT HI MDM: CPT | Mod: 25

## 2022-04-29 PROCEDURE — 93005 ELECTROCARDIOGRAM TRACING: CPT

## 2022-04-29 PROCEDURE — 93010 ELECTROCARDIOGRAM REPORT: CPT | Mod: ,,, | Performed by: INTERNAL MEDICINE

## 2022-04-29 PROCEDURE — 83690 ASSAY OF LIPASE: CPT | Performed by: EMERGENCY MEDICINE

## 2022-04-29 PROCEDURE — 87186 SC STD MICRODIL/AGAR DIL: CPT | Performed by: EMERGENCY MEDICINE

## 2022-04-29 PROCEDURE — 27000207 HC ISOLATION

## 2022-04-29 PROCEDURE — 87086 URINE CULTURE/COLONY COUNT: CPT | Performed by: EMERGENCY MEDICINE

## 2022-04-29 PROCEDURE — 25000003 PHARM REV CODE 250: Performed by: INTERNAL MEDICINE

## 2022-04-29 PROCEDURE — 89055 LEUKOCYTE ASSESSMENT FECAL: CPT | Performed by: INTERNAL MEDICINE

## 2022-04-29 PROCEDURE — U0002 COVID-19 LAB TEST NON-CDC: HCPCS | Performed by: EMERGENCY MEDICINE

## 2022-04-29 PROCEDURE — 87389 HIV-1 AG W/HIV-1&-2 AB AG IA: CPT | Performed by: EMERGENCY MEDICINE

## 2022-04-29 PROCEDURE — 82962 GLUCOSE BLOOD TEST: CPT

## 2022-04-29 PROCEDURE — 87045 FECES CULTURE AEROBIC BACT: CPT | Performed by: INTERNAL MEDICINE

## 2022-04-29 PROCEDURE — 80053 COMPREHEN METABOLIC PANEL: CPT | Performed by: EMERGENCY MEDICINE

## 2022-04-29 PROCEDURE — 87040 BLOOD CULTURE FOR BACTERIA: CPT | Mod: 59 | Performed by: INTERNAL MEDICINE

## 2022-04-29 PROCEDURE — 96376 TX/PRO/DX INJ SAME DRUG ADON: CPT

## 2022-04-29 PROCEDURE — 87186 SC STD MICRODIL/AGAR DIL: CPT | Mod: 59 | Performed by: INTERNAL MEDICINE

## 2022-04-29 PROCEDURE — 21400001 HC TELEMETRY ROOM

## 2022-04-29 PROCEDURE — 85025 COMPLETE CBC W/AUTO DIFF WBC: CPT | Performed by: EMERGENCY MEDICINE

## 2022-04-29 RX ORDER — ONDANSETRON 2 MG/ML
4 INJECTION INTRAMUSCULAR; INTRAVENOUS
Status: COMPLETED | OUTPATIENT
Start: 2022-04-29 | End: 2022-04-29

## 2022-04-29 RX ORDER — IPRATROPIUM BROMIDE AND ALBUTEROL SULFATE 2.5; .5 MG/3ML; MG/3ML
3 SOLUTION RESPIRATORY (INHALATION) EVERY 6 HOURS PRN
Status: DISCONTINUED | OUTPATIENT
Start: 2022-04-29 | End: 2022-05-04 | Stop reason: HOSPADM

## 2022-04-29 RX ORDER — LISINOPRIL 20 MG/1
20 TABLET ORAL DAILY
Status: DISCONTINUED | OUTPATIENT
Start: 2022-04-29 | End: 2022-05-04 | Stop reason: HOSPADM

## 2022-04-29 RX ORDER — ATORVASTATIN CALCIUM 10 MG/1
10 TABLET, FILM COATED ORAL DAILY
Status: DISCONTINUED | OUTPATIENT
Start: 2022-04-29 | End: 2022-05-04 | Stop reason: HOSPADM

## 2022-04-29 RX ORDER — MAG HYDROX/ALUMINUM HYD/SIMETH 200-200-20
30 SUSPENSION, ORAL (FINAL DOSE FORM) ORAL 4 TIMES DAILY PRN
Status: DISCONTINUED | OUTPATIENT
Start: 2022-04-29 | End: 2022-05-04 | Stop reason: HOSPADM

## 2022-04-29 RX ORDER — DULOXETIN HYDROCHLORIDE 30 MG/1
60 CAPSULE, DELAYED RELEASE ORAL DAILY
Status: DISCONTINUED | OUTPATIENT
Start: 2022-04-29 | End: 2022-05-04 | Stop reason: HOSPADM

## 2022-04-29 RX ORDER — METHOCARBAMOL 750 MG/1
750 TABLET, FILM COATED ORAL 3 TIMES DAILY
Status: DISCONTINUED | OUTPATIENT
Start: 2022-04-29 | End: 2022-05-03

## 2022-04-29 RX ORDER — NALOXONE HCL 0.4 MG/ML
0.02 VIAL (ML) INJECTION
Status: DISCONTINUED | OUTPATIENT
Start: 2022-04-29 | End: 2022-05-04 | Stop reason: HOSPADM

## 2022-04-29 RX ORDER — LEVOTHYROXINE SODIUM 50 UG/1
50 TABLET ORAL
Status: DISCONTINUED | OUTPATIENT
Start: 2022-04-30 | End: 2022-05-04 | Stop reason: HOSPADM

## 2022-04-29 RX ORDER — HEPARIN SODIUM 5000 [USP'U]/ML
5000 INJECTION, SOLUTION INTRAVENOUS; SUBCUTANEOUS EVERY 8 HOURS
Status: DISCONTINUED | OUTPATIENT
Start: 2022-04-29 | End: 2022-05-04 | Stop reason: HOSPADM

## 2022-04-29 RX ORDER — TALC
6 POWDER (GRAM) TOPICAL NIGHTLY PRN
Status: DISCONTINUED | OUTPATIENT
Start: 2022-04-29 | End: 2022-05-04 | Stop reason: HOSPADM

## 2022-04-29 RX ORDER — ANASTROZOLE 1 MG/1
1 TABLET ORAL DAILY
Status: DISCONTINUED | OUTPATIENT
Start: 2022-04-30 | End: 2022-05-04 | Stop reason: HOSPADM

## 2022-04-29 RX ORDER — IBUPROFEN 200 MG
16 TABLET ORAL
Status: COMPLETED | OUTPATIENT
Start: 2022-04-29 | End: 2022-04-29

## 2022-04-29 RX ORDER — DIAZEPAM 10 MG/2ML
10 INJECTION INTRAMUSCULAR EVERY 4 HOURS PRN
Status: DISCONTINUED | OUTPATIENT
Start: 2022-04-29 | End: 2022-05-04 | Stop reason: HOSPADM

## 2022-04-29 RX ORDER — SODIUM CHLORIDE, SODIUM LACTATE, POTASSIUM CHLORIDE, CALCIUM CHLORIDE 600; 310; 30; 20 MG/100ML; MG/100ML; MG/100ML; MG/100ML
INJECTION, SOLUTION INTRAVENOUS CONTINUOUS
Status: DISCONTINUED | OUTPATIENT
Start: 2022-04-29 | End: 2022-05-04 | Stop reason: HOSPADM

## 2022-04-29 RX ORDER — PANTOPRAZOLE SODIUM 40 MG/1
40 TABLET, DELAYED RELEASE ORAL DAILY
Status: DISCONTINUED | OUTPATIENT
Start: 2022-04-29 | End: 2022-05-04 | Stop reason: HOSPADM

## 2022-04-29 RX ORDER — GLUCAGON 1 MG
1 KIT INJECTION
Status: DISCONTINUED | OUTPATIENT
Start: 2022-04-29 | End: 2022-05-04 | Stop reason: HOSPADM

## 2022-04-29 RX ORDER — IBUPROFEN 200 MG
24 TABLET ORAL
Status: DISCONTINUED | OUTPATIENT
Start: 2022-04-29 | End: 2022-05-04 | Stop reason: HOSPADM

## 2022-04-29 RX ORDER — TRAZODONE HYDROCHLORIDE 100 MG/1
300 TABLET ORAL NIGHTLY PRN
Status: DISCONTINUED | OUTPATIENT
Start: 2022-04-29 | End: 2022-05-04 | Stop reason: HOSPADM

## 2022-04-29 RX ORDER — ONDANSETRON 2 MG/ML
4 INJECTION INTRAMUSCULAR; INTRAVENOUS EVERY 6 HOURS PRN
Status: DISCONTINUED | OUTPATIENT
Start: 2022-04-29 | End: 2022-05-04 | Stop reason: HOSPADM

## 2022-04-29 RX ORDER — HYDROXYZINE PAMOATE 25 MG/1
25 CAPSULE ORAL EVERY 8 HOURS PRN
Status: DISCONTINUED | OUTPATIENT
Start: 2022-04-29 | End: 2022-05-04 | Stop reason: HOSPADM

## 2022-04-29 RX ORDER — GABAPENTIN 300 MG/1
600 CAPSULE ORAL 2 TIMES DAILY
Refills: 3 | Status: DISCONTINUED | OUTPATIENT
Start: 2022-04-29 | End: 2022-05-02

## 2022-04-29 RX ORDER — ROPINIROLE 0.25 MG/1
0.25 TABLET, FILM COATED ORAL NIGHTLY
Status: DISCONTINUED | OUTPATIENT
Start: 2022-04-29 | End: 2022-05-04 | Stop reason: HOSPADM

## 2022-04-29 RX ORDER — METOPROLOL SUCCINATE 50 MG/1
50 TABLET, EXTENDED RELEASE ORAL DAILY
Status: DISCONTINUED | OUTPATIENT
Start: 2022-04-30 | End: 2022-05-04 | Stop reason: HOSPADM

## 2022-04-29 RX ORDER — INSULIN ASPART 100 [IU]/ML
0-5 INJECTION, SOLUTION INTRAVENOUS; SUBCUTANEOUS
Status: DISCONTINUED | OUTPATIENT
Start: 2022-04-29 | End: 2022-05-04 | Stop reason: HOSPADM

## 2022-04-29 RX ORDER — LANOLIN ALCOHOL/MO/W.PET/CERES
100 CREAM (GRAM) TOPICAL DAILY
Status: DISCONTINUED | OUTPATIENT
Start: 2022-04-30 | End: 2022-05-04 | Stop reason: HOSPADM

## 2022-04-29 RX ORDER — FOLIC ACID 1 MG/1
1 TABLET ORAL DAILY
Status: DISCONTINUED | OUTPATIENT
Start: 2022-04-30 | End: 2022-05-04 | Stop reason: HOSPADM

## 2022-04-29 RX ORDER — SIMETHICONE 80 MG
1 TABLET,CHEWABLE ORAL 4 TIMES DAILY PRN
Status: DISCONTINUED | OUTPATIENT
Start: 2022-04-29 | End: 2022-05-04 | Stop reason: HOSPADM

## 2022-04-29 RX ORDER — AMOXICILLIN 250 MG
1 CAPSULE ORAL 2 TIMES DAILY PRN
Status: DISCONTINUED | OUTPATIENT
Start: 2022-04-29 | End: 2022-05-04 | Stop reason: HOSPADM

## 2022-04-29 RX ORDER — IBUPROFEN 200 MG
16 TABLET ORAL
Status: DISCONTINUED | OUTPATIENT
Start: 2022-04-29 | End: 2022-05-04 | Stop reason: HOSPADM

## 2022-04-29 RX ORDER — ARIPIPRAZOLE 5 MG/1
5 TABLET ORAL DAILY
Status: DISCONTINUED | OUTPATIENT
Start: 2022-04-29 | End: 2022-05-04 | Stop reason: HOSPADM

## 2022-04-29 RX ORDER — ACETAMINOPHEN 325 MG/1
650 TABLET ORAL EVERY 4 HOURS PRN
Status: DISCONTINUED | OUTPATIENT
Start: 2022-04-29 | End: 2022-05-04 | Stop reason: HOSPADM

## 2022-04-29 RX ORDER — SODIUM CHLORIDE 0.9 % (FLUSH) 0.9 %
10 SYRINGE (ML) INJECTION
Status: DISCONTINUED | OUTPATIENT
Start: 2022-04-29 | End: 2022-05-04 | Stop reason: HOSPADM

## 2022-04-29 RX ADMIN — Medication 16 G: at 08:04

## 2022-04-29 RX ADMIN — ONDANSETRON 4 MG: 2 INJECTION INTRAMUSCULAR; INTRAVENOUS at 09:04

## 2022-04-29 RX ADMIN — ONDANSETRON 4 MG: 2 INJECTION INTRAMUSCULAR; INTRAVENOUS at 07:04

## 2022-04-29 RX ADMIN — TRAZODONE HYDROCHLORIDE 300 MG: 100 TABLET ORAL at 09:04

## 2022-04-29 RX ADMIN — HEPARIN SODIUM 5000 UNITS: 5000 INJECTION INTRAVENOUS; SUBCUTANEOUS at 02:04

## 2022-04-29 RX ADMIN — METHOCARBAMOL TABLETS 750 MG: 750 TABLET, COATED ORAL at 02:04

## 2022-04-29 RX ADMIN — ARIPIPRAZOLE 5 MG: 5 TABLET ORAL at 12:04

## 2022-04-29 RX ADMIN — GABAPENTIN 600 MG: 300 CAPSULE ORAL at 09:04

## 2022-04-29 RX ADMIN — FOLIC ACID: 5 INJECTION, SOLUTION INTRAMUSCULAR; INTRAVENOUS; SUBCUTANEOUS at 11:04

## 2022-04-29 RX ADMIN — ACETAMINOPHEN 650 MG: 325 TABLET, FILM COATED ORAL at 05:04

## 2022-04-29 RX ADMIN — GABAPENTIN 600 MG: 300 CAPSULE ORAL at 12:04

## 2022-04-29 RX ADMIN — ONDANSETRON 4 MG: 2 INJECTION INTRAMUSCULAR; INTRAVENOUS at 01:04

## 2022-04-29 RX ADMIN — LORAZEPAM 2 MG: 2 INJECTION INTRAMUSCULAR; INTRAVENOUS at 09:04

## 2022-04-29 RX ADMIN — DIAZEPAM 10 MG: 5 INJECTION, SOLUTION INTRAMUSCULAR; INTRAVENOUS at 01:04

## 2022-04-29 RX ADMIN — METHOCARBAMOL TABLETS 750 MG: 750 TABLET, COATED ORAL at 09:04

## 2022-04-29 RX ADMIN — DEXTROSE 125 ML: 10 SOLUTION INTRAVENOUS at 09:04

## 2022-04-29 RX ADMIN — DULOXETINE 60 MG: 30 CAPSULE, DELAYED RELEASE ORAL at 12:04

## 2022-04-29 RX ADMIN — LISINOPRIL 20 MG: 20 TABLET ORAL at 12:04

## 2022-04-29 RX ADMIN — ATORVASTATIN CALCIUM 10 MG: 10 TABLET, FILM COATED ORAL at 12:04

## 2022-04-29 RX ADMIN — SODIUM CHLORIDE 500 ML: 0.9 INJECTION, SOLUTION INTRAVENOUS at 07:04

## 2022-04-29 RX ADMIN — HEPARIN SODIUM 5000 UNITS: 5000 INJECTION INTRAVENOUS; SUBCUTANEOUS at 09:04

## 2022-04-29 RX ADMIN — ROPINIROLE HYDROCHLORIDE 0.25 MG: 0.25 TABLET, FILM COATED ORAL at 09:04

## 2022-04-29 RX ADMIN — PANTOPRAZOLE SODIUM 40 MG: 40 TABLET, DELAYED RELEASE ORAL at 12:04

## 2022-04-29 RX ADMIN — SODIUM CHLORIDE, SODIUM LACTATE, POTASSIUM CHLORIDE, AND CALCIUM CHLORIDE: .6; .31; .03; .02 INJECTION, SOLUTION INTRAVENOUS at 04:04

## 2022-04-29 RX ADMIN — SODIUM CHLORIDE 500 ML: 0.9 INJECTION, SOLUTION INTRAVENOUS at 09:04

## 2022-04-29 NOTE — HPI
"Per Dr. William. MAGALIE Hayden:    "Patient is a 61 year old female with a PMH significant for HTN, HLD, Depression (Suicide Attempts in past), Alcohol Abuse (drinks a half of a fifth of Whiskey per day) complicated with withdrawal seizures in past and pancreatitis , COPD (prn and qHS Oxygen of 2-3L),  HTN, HLD, Sarcoidosis of Lung (not active), Hepatic Steatosis, Hypothyroidism, PUD/Gastritis, Breast Cancer s/p double mastectomy and breast reconstruction who presented with N/V/D without abdominal pain for past week.  Patient states she started Restoril a week ago for insomnia, and since has developed N/V/D that she thought was due to the Restoril or withdrawing from Trazodone.  She restarted her Trazodone, but continued with these symptoms.  She denies abdominal pain.  No chest pain or SOB.  Patient last had a drink of alcohol yesterday, but was unable to keep most of it down.  No significant headache or change in vision.  No F/c.  No dysuria.  She was given IV NS x 1L in ED, as well as Zofran, Banana Bag, IV Thiamine, and Ativan 2MG IV.  CIWA-Ar at the time of my interview was 10.  Patient is being admitted for Alcohol Withdrawal and Ketoacidosis."  "

## 2022-04-29 NOTE — ASSESSMENT & PLAN NOTE
Mammogram on September 4, 2020 showed a focal asymmetry in the retroareolar region of the right breast.  Ultrasound at that time showed a 9 x 5 x 8 mm hypoechoic mass at 12:00 p.m.  Biopsy on September 29, 2020 showed grade 2 infiltrating ductal carcinoma (histologic grade 3, nuclear grade 2, mitotic index 2).  Tumor was 95% ER positive 20-30% WI positive and HER2 was 2+ but negative by FISH.  Ki-67 was 80%.  On October 29, 2020 bilateral mastectomy and right axillary sentinel lymph node biopsy was performed.  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  Margins were negative with closest margin 6 mm.  The sentinel lymph node was negative. Final pathological staging T1b N0 stage IA.  -Continue Arimidex

## 2022-04-29 NOTE — ASSESSMENT & PLAN NOTE
Home Meds:  Lisinopril 20mg once a day, HCTZ 25mg, and Metoprolol XL 50mg  -Continue Lisinopril and Metoprolol for now  -Monitor and adjust as needed

## 2022-04-29 NOTE — ASSESSMENT & PLAN NOTE
Patient presented with 1 week of N/V/D.  Unclear if this is related to withdrawal at this time, but CIWA-AR is elevated and evidence of ketoacidosis on admission labs.  No abdominal pain.  Last drink was day PTA.  LFTs with T bili of 0.8, Alk phos of 63, AST/ALT 42/41.  Given Ativan 2mg in ED, in addition to IV fluids and anti-emetics.  CIWA this morning was 10.    Plan:  Continue with CIWA-Ar q 4 with IV Valium 10mg prn CIWA >8  IV fluids with LR at 100ml/hr  CLD and ADAT  Anti-emetics prn  Thiamine/Folic Acid/MVI daily  Seizure and Fall precautions  SW consult

## 2022-04-29 NOTE — NURSING
Skin assessment done and witnessed by Vaishali Clark LPN. Pt has bruises noted to BUE and LLE. Redness noted to buttocks. Dryness noted to bilateral heels.

## 2022-04-29 NOTE — ASSESSMENT & PLAN NOTE
Uses Home Oxygen at night (2-3L).  Quit smoking in 2/2022 per Pulmonary Note.  Mild VINICIO - did not need CPAP.  History of Sarcoidosis - not on active treatment.  Home Meds:  Atrovent HFA, Stiolto Respimat  Last seen by Dr. Patel with Pulmonary in 12/2020.  No Tachypnea on admission.  Pox stable on 2L O2NC  Lungs clear.     Plan:  Continue Duonebs q6 prn  Continue O2 NC 2L  Follow closely

## 2022-04-29 NOTE — ASSESSMENT & PLAN NOTE
AG 22 with beta-hb elevated at 4.4.  Glucose 69.  -Continue with IV fluids - consider change to D5 1/2 NS if needed  -Monitor closely for improvement

## 2022-04-29 NOTE — ED NOTES
"Pt oxygen decreased to 87-88%, pt states she has COPD and wears O2 "sometimes" at home. Pt placed on 2L at this time. O2 increased and is now at 95%.  "

## 2022-04-29 NOTE — ED NOTES
Pt given oral glucose tablets and is unable to keep them down. Pt tp be given D10 at this time for sugar.

## 2022-04-29 NOTE — SUBJECTIVE & OBJECTIVE
Past Medical History:   Diagnosis Date    Anemia     Anemia     Controlled type 2 diabetes mellitus without complication, without long-term current use of insulin 11/30/2021    COPD (chronic obstructive pulmonary disease)     Depression     Diverticulitis     Fatty liver     GERD (gastroesophageal reflux disease)     Hyperlipidemia     Hypertension     Pancreatitis     Peptic ulcer disease     Polysubstance abuse     Posterior reversible encephalopathy syndrome     Sarcoidosis of lung     Sarcoidosis of lung     over 30 yrs ago    Seizures     7/2017    Suicide attempt     Suicide ideation        Past Surgical History:   Procedure Laterality Date    APPENDECTOMY      BILATERAL MASTECTOMY Bilateral 10/29/2020    Procedure: MASTECTOMY, BILATERAL;  Surgeon: Baylee Kevin MD;  Location: [address];  Service: General;  Laterality: Bilateral;    BREAST REVISION SURGERY Bilateral 2/11/2021    Procedure: BREAST REVISION SURGERY;  Surgeon: Scottie Johnson MD;  Location: [facility] OR [address] [place] [address];  Service: Plastics;  Laterality: Bilateral;    COLONOSCOPY N/A 7/28/2017    Procedure: COLONOSCOPY;  Surgeon: Aaron Alvarado MD;  Location: [facility];  Service: Endoscopy;  Laterality: N/A;    ESOPHAGOGASTRODUODENOSCOPY  10/7/2016, 11/6/2014    2016 - gastritis, duodenitis, 2014 erosive gastritis    ESOPHAGOGASTRODUODENOSCOPY N/A 2/11/2020    Procedure: ESOPHAGOGASTRODUODENOSCOPY (EGD);  Surgeon: Fawn Garrido MD;  Location: [facility];  Service: Endoscopy;  Laterality: N/A;    ESOPHAGOGASTRODUODENOSCOPY N/A 4/19/2021    Procedure: EGD (ESOPHAGOGASTRODUODENOSCOPY);  Surgeon: Paramjit Martino MD;  Location: [facility];  Service: Endoscopy;  Laterality: N/A;    FLEXIBLE SIGMOIDOSCOPY  11/06/2014    colitis    HYSTERECTOMY      INJECTION FOR SENTINEL NODE IDENTIFICATION Right 10/29/2020    Procedure: INJECTION, FOR SENTINEL NODE IDENTIFICATION;  Surgeon: Baylee Kevin MD;  Location: [address];  Service: General;   Laterality: Right;    INJECTION OF JOINT Right 10/10/2019    Procedure: Injection, Joint RIGHT ILIOPSOAS BURSA/TENDON INJECTION AND RIGHT GLUTEAL TENDON INJECTION WITH STEROID AND LIDOCAINE;  Surgeon: Guillaume Rico MD;  Location: [facility];  Service: Pain Management;  Laterality: Right;  NEEDS CONSENT    INSERTION OF BREAST TISSUE EXPANDER Bilateral 10/29/2020    Procedure: INSERTION, TISSUE EXPANDER, BREAST;  Surgeon: Scottie Johnson MD;  Location: [facility] OR [address] [place] [address];  Service: Plastics;  Laterality: Bilateral;  Right breast: 1082 g  Left breast: 1076 g    LIPOSUCTION Bilateral 2/11/2021    Procedure: LIPOSUCTION;  Surgeon: Scottie Johnson MD;  Location: [facility] OR [address] [place] [address];  Service: Plastics;  Laterality: Bilateral;    mediastenoscopy      REPLACEMENT OF IMPLANT OF BREAST Bilateral 2/11/2021    Procedure: REPLACEMENT, IMPLANT, BREAST;  Surgeon: Scottie Johnson MD;  Location: [address];  Service: Plastics;  Laterality: Bilateral;    SENTINEL LYMPH NODE BIOPSY Right 10/29/2020    Procedure: BIOPSY, LYMPH NODE, SENTINEL;  Surgeon: Baylee Kevin MD;  Location: [address];  Service: General;  Laterality: Right;    TONSILLECTOMY N/A 1970    TUBAL LIGATION         Review of patient's allergies indicates:   Allergen Reactions    Lortab  [hydrocodone-acetaminophen] Itching    Promethazine Other (See Comments) and Itching     Other reaction(s): Itching       Current Facility-Administered Medications on File Prior to Encounter   Medication    albuterol sulfate nebulizer solution 2.5 mg     Current Outpatient Medications on File Prior to Encounter   Medication Sig    anastrozole (ARIMIDEX) 1 mg Tab Take 1 tablet (1 mg total) by mouth once daily.    ARIPiprazole (ABILIFY) 5 MG Tab Take 5 mg by mouth once daily.    atorvastatin (LIPITOR) 10 MG tablet Take 10 mg by mouth once daily.    ATROVENT HFA 17 mcg/actuation inhaler Inhale 2 puffs into the lungs every 6 (six) hours as needed for Wheezing.     chlorzoxazone (PARAFON FORTE) 500 mg Tab Take 500 mg by mouth 2 (two) times daily as needed.    DULoxetine (CYMBALTA) 20 MG capsule Take 60 mg by mouth once daily.    gabapentin (NEURONTIN) 600 MG tablet Take 1 tablet (600 mg total) by mouth 2 (two) times daily.    hydroCHLOROthiazide (HYDRODIURIL) 25 MG tablet Take 25 mg by mouth once daily.    levothyroxine (SYNTHROID) 50 MCG tablet Take 1 tablet (50 mcg total) by mouth before breakfast.    lisinopriL (PRINIVIL,ZESTRIL) 20 MG tablet Take 1 tablet (20 mg total) by mouth once daily.    metFORMIN (GLUCOPHAGE-XR) 500 MG ER 24hr tablet Take 2 tablets (1,000 mg total) by mouth 2 (two) times daily with meals.    methocarbamoL (ROBAXIN) 750 MG Tab Take 750 mg by mouth 3 (three) times daily.    omega-3 fatty acids 1,000 mg Cap Take 1 capsule by mouth once daily.    ondansetron (ZOFRAN) 4 MG tablet Take 4 mg by mouth every 8 (eight) hours as needed.    ondansetron (ZOFRAN-ODT) 4 MG TbDL Zofran ODT Take PRN No date recorded tablet,disintegrating No frequency recorded No route recorded No set duration recorded No set duration amount recorded suspended 4 mg    pantoprazole (PROTONIX) 40 MG tablet Take 1 tablet (40 mg total) by mouth once daily.    rOPINIRole (REQUIP) 0.25 MG tablet Take 0.25 mg by mouth every evening.    semaglutide (OZEMPIC) 0.25 mg or 0.5 mg(2 mg/1.5 mL) pen injector Inject 0.5 mg into the skin every 7 days. Start with 0.25 mg weekly for 4 weeks and then increase to 0.5 mg if you are tolerating this dose    temazepam (RESTORIL) 15 mg Cap Take 1 capsule (15 mg total) by mouth nightly as needed (insomnia).    tiotropium-olodateroL (STIOLTO RESPIMAT) 2.5-2.5 mcg/actuation Mist Inhale 1 puff into the lungs 2 (two) times a day. Controller    traZODone (DESYREL) 100 MG tablet Take 1 tablet (100 mg total) by mouth nightly as needed for Insomnia. (Patient taking differently: Take 300 mg by mouth nightly as needed for Insomnia.)    baclofen (LIORESAL) 500 mcg/mL  injection baclofen Take PRN No date recorded No form recorded No frequency recorded No route recorded No set duration recorded No set duration amount recorded suspended No dosage strength recorded No dosage strength units of measure recorded    cephALEXin (KEFLEX) 500 MG capsule 1 capsule.    cloNIDine (CATAPRES) 0.1 MG tablet Take 0.1 mg by mouth 4 (four) times daily.    DEPLIN, ALGAL OIL, 15-90.314 mg Cap Take 1 capsule by mouth every morning.    disulfiram (ANTABUSE) 250 mg tablet Take by mouth.    FLUoxetine 60 mg Tab Take 60 mg by mouth once daily.     hydrOXYzine pamoate (VISTARIL) 50 MG Cap Take 25 mg by mouth every 8 (eight) hours as needed.    metoprolol succinate (TOPROL-XL) 50 MG 24 hr tablet Take 50 mg by mouth once daily.     Family History       Problem Relation (Age of Onset)    Breast cancer Maternal Aunt, Daughter    Colon cancer Maternal Uncle    Diabetes Father, Mother    Heart attack Father    Hypertension Father, Mother          Tobacco Use    Smoking status: Current Every Day Smoker     Packs/day: 0.50     Years: 30.00     Pack years: 15.00     Types: Cigarettes     Last attempt to quit: 2021     Years since quittin.2    Smokeless tobacco: Never Used   Substance and Sexual Activity    Alcohol use: Yes     Comment: daily     Drug use: Yes     Types: Marijuana     Comment: currently    Sexual activity: Yes     Birth control/protection: Surgical     Review of Systems   Constitutional:  Positive for appetite change (decreased). Negative for activity change, chills, fatigue and fever.   HENT:  Negative for congestion and ear pain.    Eyes:  Negative for pain.   Respiratory:  Negative for cough, chest tightness and wheezing.    Cardiovascular:  Negative for chest pain, palpitations and leg swelling.   Gastrointestinal:  Positive for abdominal distention (chronic), nausea (improved) and vomiting (resolved). Negative for abdominal pain, diarrhea and rectal pain.   Endocrine: Negative for  polydipsia, polyphagia and polyuria.   Genitourinary:  Negative for difficulty urinating, dysuria and pelvic pain.   Musculoskeletal:  Negative for neck pain.   Skin:  Negative for rash.   Neurological:  Negative for dizziness, tremors and headaches.   Hematological:  Does not bruise/bleed easily.   Psychiatric/Behavioral:  Positive for dysphoric mood and sleep disturbance. Negative for agitation, behavioral problems, confusion, decreased concentration and suicidal ideas.    Objective:     Vital Signs (Most Recent):  Temp: 98.4 °F (36.9 °C) (04/29/22 0759)  Pulse: (!) 115 (04/29/22 1251)  Resp: (!) 21 (04/29/22 1251)  BP: (!) 145/55 (04/29/22 1053)  SpO2: (!) 93 % (04/29/22 1251)   Vital Signs (24h Range):  Temp:  [98.4 °F (36.9 °C)] 98.4 °F (36.9 °C)  Pulse:  [] 115  Resp:  [12-21] 21  SpO2:  [88 %-95 %] 93 %  BP: (142-148)/(55-70) 145/55        There is no height or weight on file to calculate BMI.    Physical Exam  Constitutional:       General: She is not in acute distress.     Appearance: She is obese. She is ill-appearing (chronic). She is not toxic-appearing or diaphoretic.   HENT:      Head: Normocephalic and atraumatic.      Right Ear: External ear normal.      Left Ear: External ear normal.      Nose: Nose normal.      Mouth/Throat:      Mouth: Mucous membranes are moist.   Eyes:      General: No scleral icterus.     Pupils: Pupils are equal, round, and reactive to light.   Cardiovascular:      Rate and Rhythm: Normal rate and regular rhythm.      Heart sounds: No murmur heard.  Pulmonary:      Effort: Pulmonary effort is normal. No respiratory distress.      Breath sounds: Normal breath sounds. No wheezing or rales.   Abdominal:      General: Bowel sounds are normal. There is distension.      Palpations: There is no mass.      Tenderness: There is abdominal tenderness (mild RUQ). There is no guarding or rebound.   Musculoskeletal:         General: Normal range of motion.      Cervical back: Normal  range of motion and neck supple.      Right lower leg: No edema.      Left lower leg: No edema.   Skin:     General: Skin is warm and dry.      Findings: No rash.   Neurological:      General: No focal deficit present.      Mental Status: She is alert and oriented to person, place, and time. Mental status is at baseline.   Psychiatric:         Mood and Affect: Mood is anxious.         Speech: Speech is not delayed.         Behavior: Behavior is not agitated or aggressive.         Thought Content: Thought content does not include suicidal ideation.         CRANIAL NERVES     CN III, IV, VI   Pupils are equal, round, and reactive to light.     Significant Labs: All pertinent labs within the past 24 hours have been reviewed.    Significant Imaging: I have reviewed all pertinent imaging results/findings within the past 24 hours.

## 2022-04-29 NOTE — ED TRIAGE NOTES
62 y/o pt arrived to ED with complaints of N/V and fatigue x3 days. Pt states she is an alcoholic and states her last drink was this morning but she was unable to keep it down. Pt alert and oriented x3, no signs of acute distress. Pt denies any falls and LOC. VS WNL.

## 2022-04-29 NOTE — ED NOTES
HPI:  Pt comes in by private vehicle with generalized weakness; states she has had nausea, vomiting, and diarrhea for 3 days. Reports hx of alcoholism, gastritis, diabetes, sarcoidosis, and breast cancer; states her bg was 75 this morning but couldn't keep down a glucose tablet. Tried to drink an alcoholic beverage during the night to prevent withdrawal but couldn't keep it down. She did take a zofran this morning without relief.

## 2022-04-29 NOTE — ASSESSMENT & PLAN NOTE
Patient presented with 1 week of N/V/D.  No complaints of abdominal pain.  Has decreased appetite.  Still with nausea and last episode of diarrhea was day PTA.  Mostly described as loose to soft.  No watery BMs. Labs on admission with normal Lipase.  LFTs on admission with mild bump in AST at 42.  No imaging done in ED.  She was evaluated by GI on 4/18/2022 for Loose stools and bloating with Fecal incontinence - TTG, IgA normal at that time.  Pancreatic elastase pending.  She was recommended to start Fibercon 2 tabs daily.   -Send Stool C Diff, WBC, Culture  -Consider imaging  -Treat withdrawal as above

## 2022-04-29 NOTE — ED PROVIDER NOTES
Encounter Date: 4/29/2022    SCRIBE #1 NOTE: ILeanne, am scribing for, and in the presence of, Donna Rockwell MD.       History     Chief Complaint   Patient presents with    Emesis    Fatigue     Time seen by provider: 7:27 AM    This is a 61 y.o. female with PMHx of breast cancer in remission, HTN, HLD, DM, GERD, COPD and alcohol abuse who presents with complaint of N/V/D x 1 week. She reports that she has been experiencing about 4 episodes of diarrhea daily and has been unable to tolerate PO, including her regular alcohol intake. She reports lightheadedness and dizziness. She denies any associated abdominal pain or blood in the stool or vomit. She denies any recent illness such as fever, chills, congestion, cough, rhinorrhea. She has not experienced any chest pain, palpitations or shortness of breath. She denies any urinary symptoms. She states that she has not been taking her HTN medications since current symptoms began, but her blood pressure has been low at home. She states that her CBG has been within normal range; she manages her DM with insulin. The patient admits that she drinks bourbon whiskey daily. She has not been able to tolerate alcohol since her current symptoms began and feels that she is experiencing withdrawal symptoms. She also admits to taking marijuana gummies and use a vape throughout the day. She does not smoke cigarettes or use street drugs. Of note, the patient's  reports that she had a brief syncopal episode in the car on the way to the hospital without any associated trauma. This is the extent of the patient's complaints at this time.    The history is provided by the patient and the spouse.     Review of patient's allergies indicates:   Allergen Reactions    Lortab  [hydrocodone-acetaminophen] Itching    Promethazine Other (See Comments) and Itching     Other reaction(s): Itching     Past Medical History:   Diagnosis Date    Anemia     Anemia     Controlled type  2 diabetes mellitus without complication, without long-term current use of insulin 11/30/2021    COPD (chronic obstructive pulmonary disease)     Depression     Diverticulitis     Fatty liver     GERD (gastroesophageal reflux disease)     Hyperlipidemia     Hypertension     Pancreatitis     Peptic ulcer disease     Polysubstance abuse     Posterior reversible encephalopathy syndrome     Sarcoidosis of lung     Sarcoidosis of lung     over 30 yrs ago    Seizures     7/2017    Suicide attempt     Suicide ideation      Past Surgical History:   Procedure Laterality Date    APPENDECTOMY      BILATERAL MASTECTOMY Bilateral 10/29/2020    Procedure: MASTECTOMY, BILATERAL;  Surgeon: Baylee Kevin MD;  Location: [address];  Service: General;  Laterality: Bilateral;    BREAST REVISION SURGERY Bilateral 2/11/2021    Procedure: BREAST REVISION SURGERY;  Surgeon: Scottie Johnson MD;  Location: [facility] OR [address] [place] [address];  Service: Plastics;  Laterality: Bilateral;    COLONOSCOPY N/A 7/28/2017    Procedure: COLONOSCOPY;  Surgeon: Aaron Alvarado MD;  Location: [facility];  Service: Endoscopy;  Laterality: N/A;    ESOPHAGOGASTRODUODENOSCOPY  10/7/2016, 11/6/2014    2016 - gastritis, duodenitis, 2014 erosive gastritis    ESOPHAGOGASTRODUODENOSCOPY N/A 2/11/2020    Procedure: ESOPHAGOGASTRODUODENOSCOPY (EGD);  Surgeon: Fawn Garrido MD;  Location: [facility];  Service: Endoscopy;  Laterality: N/A;    ESOPHAGOGASTRODUODENOSCOPY N/A 4/19/2021    Procedure: EGD (ESOPHAGOGASTRODUODENOSCOPY);  Surgeon: Paramjit Martino MD;  Location: [facility];  Service: Endoscopy;  Laterality: N/A;    FLEXIBLE SIGMOIDOSCOPY  11/06/2014    colitis    HYSTERECTOMY      INJECTION FOR SENTINEL NODE IDENTIFICATION Right 10/29/2020    Procedure: INJECTION, FOR SENTINEL NODE IDENTIFICATION;  Surgeon: Baylee Kevin MD;  Location: [address];  Service: General;  Laterality: Right;    INJECTION OF JOINT Right 10/10/2019     Procedure: Injection, Joint RIGHT ILIOPSOAS BURSA/TENDON INJECTION AND RIGHT GLUTEAL TENDON INJECTION WITH STEROID AND LIDOCAINE;  Surgeon: Guillaume Rico MD;  Location: [facility];  Service: Pain Management;  Laterality: Right;  NEEDS CONSENT    INSERTION OF BREAST TISSUE EXPANDER Bilateral 10/29/2020    Procedure: INSERTION, TISSUE EXPANDER, BREAST;  Surgeon: Scottie Johnson MD;  Location: [facility] OR [address] [place] [address];  Service: Plastics;  Laterality: Bilateral;  Right breast: 1082 g  Left breast: 1076 g    LIPOSUCTION Bilateral 2021    Procedure: LIPOSUCTION;  Surgeon: Scottie Johnson MD;  Location: [facility] OR [address] [place] [address];  Service: Plastics;  Laterality: Bilateral;    mediastenoscopy      REPLACEMENT OF IMPLANT OF BREAST Bilateral 2021    Procedure: REPLACEMENT, IMPLANT, BREAST;  Surgeon: Scottie Johnson MD;  Location: [facility] OR [address] [place] [address];  Service: Plastics;  Laterality: Bilateral;    SENTINEL LYMPH NODE BIOPSY Right 10/29/2020    Procedure: BIOPSY, LYMPH NODE, SENTINEL;  Surgeon: Baylee Kevin MD;  Location: [address];  Service: General;  Laterality: Right;    TONSILLECTOMY N/A 1970    TUBAL LIGATION       Family History   Problem Relation Age of Onset    Heart attack Father     Diabetes Father     Hypertension Father     Diabetes Mother     Hypertension Mother     Breast cancer Maternal Aunt     Colon cancer Maternal Uncle     Breast cancer Daughter     Ovarian cancer Neg Hx     Cancer Neg Hx      Social History     Tobacco Use    Smoking status: Current Every Day Smoker     Packs/day: 0.50     Years: 30.00     Pack years: 15.00     Types: Cigarettes     Last attempt to quit: 2021     Years since quittin.2    Smokeless tobacco: Never Used   Substance Use Topics    Alcohol use: Yes     Comment: daily     Drug use: Yes     Types: Marijuana     Comment: currently     Review of Systems   Constitutional: Negative for chills and fever.   HENT: Negative for congestion,  rhinorrhea and sore throat.    Eyes: Negative for visual disturbance.   Respiratory: Negative for cough and shortness of breath.    Cardiovascular: Negative for chest pain and palpitations.   Gastrointestinal: Positive for diarrhea, nausea and vomiting. Negative for abdominal pain and blood in stool.   Genitourinary: Negative for decreased urine volume, dysuria, frequency, urgency and vaginal discharge.   Musculoskeletal: Negative for joint swelling, neck pain and neck stiffness.   Skin: Negative for rash and wound.   Neurological: Positive for dizziness, tremors, syncope and light-headedness. Negative for weakness, numbness and headaches.   Psychiatric/Behavioral: Negative for confusion. The patient is nervous/anxious.        Physical Exam     Initial Vitals   BP Pulse Resp Temp SpO2   04/29/22 0758 04/29/22 0732 04/29/22 0727 04/29/22 0759 04/29/22 0758   (!) 142/67 97 12 98.4 °F (36.9 °C) 95 %      MAP       --                Physical Exam    Nursing note and vitals reviewed.  Constitutional: She appears well-developed and well-nourished. She appears distressed.   HENT:   Head: Normocephalic and atraumatic.   Mouth/Throat: Oropharynx is clear and moist. No oropharyngeal exudate.   Eyes: Conjunctivae and EOM are normal. Pupils are equal, round, and reactive to light.   Neck: Neck supple.   Normal range of motion.  Cardiovascular: Normal rate and normal heart sounds.   No murmur heard.  Pulmonary/Chest: Breath sounds normal. No respiratory distress. She has no wheezes. She has no rhonchi. She has no rales.   Abdominal: Abdomen is soft. Bowel sounds are normal. There is no abdominal tenderness. There is no rebound and no guarding.   Musculoskeletal:         General: No tenderness or edema.      Cervical back: Normal range of motion and neck supple.     Neurological: She is oriented to person, place, and time. She has normal strength. GCS score is 15. GCS eye subscore is 4. GCS verbal subscore is 5. GCS motor  subscore is 6.   Mild tremulousness.   Skin: Skin is warm and dry. No rash noted.   Psychiatric: She has a normal mood and affect. Thought content normal.         ED Course   Procedures  Labs Reviewed   CBC W/ AUTO DIFFERENTIAL - Abnormal; Notable for the following components:       Result Value    MCHC 30.9 (*)     RDW 19.3 (*)     Platelets 108 (*)     All other components within normal limits    Narrative:     Release to patient->Immediate   COMPREHENSIVE METABOLIC PANEL - Abnormal; Notable for the following components:    CO2 20 (*)     Glucose 69 (*)     AST 42 (*)     Anion Gap 22 (*)     All other components within normal limits    Narrative:     Release to patient->Immediate   BETA - HYDROXYBUTYRATE, SERUM - Abnormal; Notable for the following components:    Beta-Hydroxybutyrate 4.4 (*)     All other components within normal limits    Narrative:     Release to patient->Immediate   HIV 1 / 2 ANTIBODY    Narrative:     Release to patient->Immediate   HEPATITIS C ANTIBODY    Narrative:     Release to patient->Immediate   LIPASE    Narrative:     Release to patient->Immediate   MAGNESIUM    Narrative:     Release to patient->Immediate   PHOSPHORUS    Narrative:     Release to patient->Immediate   URINALYSIS, REFLEX TO URINE CULTURE   SARS-COV-2 RDRP GENE   POCT GLUCOSE     EKG Readings: (Independently Interpreted)   Initial Reading: No STEMI. Rhythm: Normal Sinus Rhythm. Heart Rate: 99.   No ectopy. Motion artifact present.        Imaging Results    None          Medications   sodium chloride 0.9% bolus 500 mL (500 mLs Intravenous New Bag 4/29/22 0919)   dextrose 50% injection 25 g (has no administration in time range)   dextrose 10% bolus 125 mL (125 mLs Intravenous New Bag 4/29/22 0920)   dextrose 10% bolus 250 mL (has no administration in time range)   sodium chloride 0.9% bolus 500 mL (0 mLs Intravenous Stopped 4/29/22 8042)   ondansetron injection 4 mg (4 mg Intravenous Given 4/29/22 8967)   glucose  chewable tablet 16 g (16 g Oral Given 4/29/22 0858)   ondansetron injection 4 mg (4 mg Intravenous Given 4/29/22 0914)   lorazepam (ATIVAN) injection 2 mg (2 mg Intravenous Given 4/29/22 0914)     Medical Decision Making:   History:   Old Medical Records: I decided to obtain old medical records.  Independently Interpreted Test(s):   I have ordered and independently interpreted EKG Reading(s) - see prior notes  Clinical Tests:   Lab Tests: Ordered and Reviewed  Medical Tests: Ordered and Reviewed  ED Management:  Emergent evaluation of 61-year-old female who presents complaint of nausea vomiting and diarrhea x1 week, alcohol withdrawal symptoms related to being unable to consume her regular amount of alcohol.  Vital signs are benign, afebrile.  On exam she is mildly lethargic, mildly tremulous.  Patient awakens during a vomiting episode and was tachycardic into the 120s with anxiety, requesting treatment for withdrawal.  Patient was mildly hypoglycemic, and did not tolerate oral glucose, necessitating D50 administration.  Labs do reveal evidence of alcoholic ketoacidosis, possibly precipitated by viral GI illness?  Abdomen is soft and nontender, nonsurgical abdomen and I do not suspect a serious intra-abdominal process at this time.  Patient required several doses of antiemetics and still experiences nausea.  Ultimately she is admitted to the hospitalist service in stable condition for further care and monitoring.  She was treated here with IV fluids, benzodiazepines, Zofran.  Banana bag and thiamine are ordered.          Scribe Attestation:   Scribe #1: I performed the above scribed service and the documentation accurately describes the services I performed. I attest to the accuracy of the note.        ED Course as of 04/29/22 0944 Fri Apr 29, 2022   0908 Patient is now tachycardic in the 120s, complaining of worsened withdrawal symptoms.  She reports no improvement in nausea. [AK]   0750 I paged the hospitalist  to discuss care. [AK]   6188 Discussed the patient's case with the Hospitalist.  [MG]      ED Course User Index  [AK] Donna Rockwell MD  [MG] Leanne Rosenthal          Physician Attestation for Scribe: I, Donna Rockwell, reviewed documentation as scribed in my presence, which is both accurate and complete.    Clinical Impression:   Final diagnoses:  [R55] Syncope  [R11.2] Intractable vomiting with nausea, unspecified vomiting type (Primary)  [E16.2] Hypoglycemia  [E87.2] Alcoholic ketoacidosis          ED Disposition Condition    Admit               Donna Rockwell MD  04/29/22 1026

## 2022-04-29 NOTE — ASSESSMENT & PLAN NOTE
Home Meds:  Metformin XR 1g bid, Ozempic q7 days  A1C 6.6 in 3/2022.  -Hold Home Meds  -POCT Glucose qAC and HS  -Low-correction dose SSI prn  -Diabetic Diet

## 2022-04-29 NOTE — NURSING
Received patient from ED. Pt AAO x 4. Resp. even and unlabored. Pt c/o nausea. Denies any c/o pain. Emesis bag @ bedside. Had Zofran recently in ED. Pt has tremors noted when arms extended out noted. Family @ bedside. Safety maintained. Call light in reach.

## 2022-04-29 NOTE — H&P
[facility] Medicine  History & Physical    Patient Name: Earl Abdul  MRN: [id number]  Patient Class: IP- Inpatient  Admission Date: 4/29/2022  Attending Physician: Stewart Hayden MD   Primary Care Provider: Andrew Rodriguez MD         Patient information was obtained from patient, past medical records and ER records.     Subjective:     Principal Problem:Alcohol withdrawal    Chief Complaint:   Chief Complaint   Patient presents with    Emesis    Fatigue        HPI: Patient is a 61 year old female with a PMH significant for HTN, HLD, Depression (Suicide Attempts in past), Alcohol Abuse (drinks a half of a fifth of Whiskey per day) complicated with withdrawal seizures in past and pancreatitis , COPD (prn and qHS Oxygen of 2-3L),  HTN, HLD, Sarcoidosis of Lung (not active), Hepatic Steatosis, Hypothyroidism, PUD/Gastritis, Breast Cancer s/p double mastectomy and breast reconstruction who presented with N/V/D without abdominal pain for past week.  Patient states she started Restoril a week ago for insomnia, and since has developed N/V/D that she thought was due to the Restoril or withdrawing from Trazodone.  She restarted her Trazodone, but continued with these symptoms.  She denies abdominal pain.  No chest pain or SOB.  Patient last had a drink of alcohol yesterday, but was unable to keep most of it down.  No significant headache or change in vision.  No F/c.  No dysuria.  She was given IV NS x 1L in ED, as well as Zofran, Banana Bag, IV Thiamine, and Ativan 2MG IV.  CIWA-Ar at the time of my interview was 10.  Patient is being admitted for Alcohol Withdrawal and Ketoacidosis.      Past Medical History:   Diagnosis Date    Anemia     Anemia     Controlled type 2 diabetes mellitus without complication, without long-term current use of insulin 11/30/2021    COPD (chronic obstructive pulmonary disease)     Depression     Diverticulitis     Fatty liver     GERD  (gastroesophageal reflux disease)     Hyperlipidemia     Hypertension     Pancreatitis     Peptic ulcer disease     Polysubstance abuse     Posterior reversible encephalopathy syndrome     Sarcoidosis of lung     Sarcoidosis of lung     over 30 yrs ago    Seizures     7/2017    Suicide attempt     Suicide ideation        Past Surgical History:   Procedure Laterality Date    APPENDECTOMY      BILATERAL MASTECTOMY Bilateral 10/29/2020    Procedure: MASTECTOMY, BILATERAL;  Surgeon: Baylee Kevin MD;  Location: [address];  Service: General;  Laterality: Bilateral;    BREAST REVISION SURGERY Bilateral 2/11/2021    Procedure: BREAST REVISION SURGERY;  Surgeon: Scottie Johnson MD;  Location: [address];  Service: Plastics;  Laterality: Bilateral;    COLONOSCOPY N/A 7/28/2017    Procedure: COLONOSCOPY;  Surgeon: Aaron Alvarado MD;  Location: [facility];  Service: Endoscopy;  Laterality: N/A;    ESOPHAGOGASTRODUODENOSCOPY  10/7/2016, 11/6/2014    2016 - gastritis, duodenitis, 2014 erosive gastritis    ESOPHAGOGASTRODUODENOSCOPY N/A 2/11/2020    Procedure: ESOPHAGOGASTRODUODENOSCOPY (EGD);  Surgeon: Fawn Garrido MD;  Location: [facility];  Service: Endoscopy;  Laterality: N/A;    ESOPHAGOGASTRODUODENOSCOPY N/A 4/19/2021    Procedure: EGD (ESOPHAGOGASTRODUODENOSCOPY);  Surgeon: Paramjit Martino MD;  Location: [facility];  Service: Endoscopy;  Laterality: N/A;    FLEXIBLE SIGMOIDOSCOPY  11/06/2014    colitis    HYSTERECTOMY      INJECTION FOR SENTINEL NODE IDENTIFICATION Right 10/29/2020    Procedure: INJECTION, FOR SENTINEL NODE IDENTIFICATION;  Surgeon: Baylee Kevin MD;  Location: [address];  Service: General;  Laterality: Right;    INJECTION OF JOINT Right 10/10/2019    Procedure: Injection, Joint RIGHT ILIOPSOAS BURSA/TENDON INJECTION AND RIGHT GLUTEAL TENDON INJECTION WITH STEROID AND LIDOCAINE;  Surgeon: Guillaume Rico MD;  Location: [facility];  Service: Pain  Management;  Laterality: Right;  NEEDS CONSENT    INSERTION OF BREAST TISSUE EXPANDER Bilateral 10/29/2020    Procedure: INSERTION, TISSUE EXPANDER, BREAST;  Surgeon: Scottie Johnson MD;  Location: [facility] OR 2ND FLR;  Service: Plastics;  Laterality: Bilateral;  Right breast: 1082 g  Left breast: 1076 g    LIPOSUCTION Bilateral 2/11/2021    Procedure: LIPOSUCTION;  Surgeon: Scottie Johnson MD;  [facility];  Service: Plastics;  Laterality: Bilateral;    mediastenoscopy      REPLACEMENT OF IMPLANT OF BREAST Bilateral 2/11/2021    Procedure: REPLACEMENT, IMPLANT, BREAST;  Surgeon: Scottie Johnson MD;  [facility];  Service: Plastics;  Laterality: Bilateral;    SENTINEL LYMPH NODE BIOPSY Right 10/29/2020    Procedure: BIOPSY, LYMPH NODE, SENTINEL;  Surgeon: Baylee Kevin MD;  [facility];  Service: General;  Laterality: Right;    TONSILLECTOMY N/A 1970    TUBAL LIGATION         Review of patient's allergies indicates:   Allergen Reactions    Lortab  [hydrocodone-acetaminophen] Itching    Promethazine Other (See Comments) and Itching     Other reaction(s): Itching       Current Facility-Administered Medications on File Prior to Encounter   Medication    albuterol sulfate nebulizer solution 2.5 mg     Current Outpatient Medications on File Prior to Encounter   Medication Sig    anastrozole (ARIMIDEX) 1 mg Tab Take 1 tablet (1 mg total) by mouth once daily.    ARIPiprazole (ABILIFY) 5 MG Tab Take 5 mg by mouth once daily.    atorvastatin (LIPITOR) 10 MG tablet Take 10 mg by mouth once daily.    ATROVENT HFA 17 mcg/actuation inhaler Inhale 2 puffs into the lungs every 6 (six) hours as needed for Wheezing.    chlorzoxazone (PARAFON FORTE) 500 mg Tab Take 500 mg by mouth 2 (two) times daily as needed.    DULoxetine (CYMBALTA) 20 MG capsule Take 60 mg by mouth once daily.    gabapentin (NEURONTIN) 600 MG tablet Take 1 tablet (600 mg total) by  mouth 2 (two) times daily.    hydroCHLOROthiazide (HYDRODIURIL) 25 MG tablet Take 25 mg by mouth once daily.    levothyroxine (SYNTHROID) 50 MCG tablet Take 1 tablet (50 mcg total) by mouth before breakfast.    lisinopriL (PRINIVIL,ZESTRIL) 20 MG tablet Take 1 tablet (20 mg total) by mouth once daily.    metFORMIN (GLUCOPHAGE-XR) 500 MG ER 24hr tablet Take 2 tablets (1,000 mg total) by mouth 2 (two) times daily with meals.    methocarbamoL (ROBAXIN) 750 MG Tab Take 750 mg by mouth 3 (three) times daily.    omega-3 fatty acids 1,000 mg Cap Take 1 capsule by mouth once daily.    ondansetron (ZOFRAN) 4 MG tablet Take 4 mg by mouth every 8 (eight) hours as needed.    ondansetron (ZOFRAN-ODT) 4 MG TbDL Zofran ODT Take PRN No date recorded tablet,disintegrating No frequency recorded No route recorded No set duration recorded No set duration amount recorded suspended 4 mg    pantoprazole (PROTONIX) 40 MG tablet Take 1 tablet (40 mg total) by mouth once daily.    rOPINIRole (REQUIP) 0.25 MG tablet Take 0.25 mg by mouth every evening.    semaglutide (OZEMPIC) 0.25 mg or 0.5 mg(2 mg/1.5 mL) pen injector Inject 0.5 mg into the skin every 7 days. Start with 0.25 mg weekly for 4 weeks and then increase to 0.5 mg if you are tolerating this dose    temazepam (RESTORIL) 15 mg Cap Take 1 capsule (15 mg total) by mouth nightly as needed (insomnia).    tiotropium-olodateroL (STIOLTO RESPIMAT) 2.5-2.5 mcg/actuation Mist Inhale 1 puff into the lungs 2 (two) times a day. Controller    traZODone (DESYREL) 100 MG tablet Take 1 tablet (100 mg total) by mouth nightly as needed for Insomnia. (Patient taking differently: Take 300 mg by mouth nightly as needed for Insomnia.)    baclofen (LIORESAL) 500 mcg/mL injection baclofen Take PRN No date recorded No form recorded No frequency recorded No route recorded No set duration recorded No set duration amount recorded suspended No dosage strength recorded No dosage strength units of  measure recorded    cephALEXin (KEFLEX) 500 MG capsule 1 capsule.    cloNIDine (CATAPRES) 0.1 MG tablet Take 0.1 mg by mouth 4 (four) times daily.    DEPLIN, ALGAL OIL, 15-90.314 mg Cap Take 1 capsule by mouth every morning.    disulfiram (ANTABUSE) 250 mg tablet Take by mouth.    FLUoxetine 60 mg Tab Take 60 mg by mouth once daily.     hydrOXYzine pamoate (VISTARIL) 50 MG Cap Take 25 mg by mouth every 8 (eight) hours as needed.    metoprolol succinate (TOPROL-XL) 50 MG 24 hr tablet Take 50 mg by mouth once daily.     Family History       Problem Relation (Age of Onset)    Breast cancer Maternal Aunt, Daughter    Colon cancer Maternal Uncle    Diabetes Father, Mother    Heart attack Father    Hypertension Father, Mother          Tobacco Use    Smoking status: Current Every Day Smoker     Packs/day: 0.50     Years: 30.00     Pack years: 15.00     Types: Cigarettes     Last attempt to quit: 2021     Years since quittin.2    Smokeless tobacco: Never Used   Substance and Sexual Activity    Alcohol use: Yes     Comment: daily     Drug use: Yes     Types: Marijuana     Comment: currently    Sexual activity: Yes     Birth control/protection: Surgical     Review of Systems   Constitutional:  Positive for appetite change (decreased). Negative for activity change, chills, fatigue and fever.   HENT:  Negative for congestion and ear pain.    Eyes:  Negative for pain.   Respiratory:  Negative for cough, chest tightness and wheezing.    Cardiovascular:  Negative for chest pain, palpitations and leg swelling.   Gastrointestinal:  Positive for abdominal distention (chronic), nausea (improved) and vomiting (resolved). Negative for abdominal pain, diarrhea and rectal pain.   Endocrine: Negative for polydipsia, polyphagia and polyuria.   Genitourinary:  Negative for difficulty urinating, dysuria and pelvic pain.   Musculoskeletal:  Negative for neck pain.   Skin:  Negative for rash.   Neurological:  Negative for  dizziness, tremors and headaches.   Hematological:  Does not bruise/bleed easily.   Psychiatric/Behavioral:  Positive for dysphoric mood and sleep disturbance. Negative for agitation, behavioral problems, confusion, decreased concentration and suicidal ideas.    Objective:     Vital Signs (Most Recent):  Temp: 98.4 °F (36.9 °C) (04/29/22 0759)  Pulse: (!) 115 (04/29/22 1251)  Resp: (!) 21 (04/29/22 1251)  BP: (!) 145/55 (04/29/22 1053)  SpO2: (!) 93 % (04/29/22 1251)   Vital Signs (24h Range):  Temp:  [98.4 °F (36.9 °C)] 98.4 °F (36.9 °C)  Pulse:  [] 115  Resp:  [12-21] 21  SpO2:  [88 %-95 %] 93 %  BP: (142-148)/(55-70) 145/55        There is no height or weight on file to calculate BMI.    Physical Exam  Constitutional:       General: She is not in acute distress.     Appearance: She is obese. She is ill-appearing (chronic). She is not toxic-appearing or diaphoretic.   HENT:      Head: Normocephalic and atraumatic.      Right Ear: External ear normal.      Left Ear: External ear normal.      Nose: Nose normal.      Mouth/Throat:      Mouth: Mucous membranes are moist.   Eyes:      General: No scleral icterus.     Pupils: Pupils are equal, round, and reactive to light.   Cardiovascular:      Rate and Rhythm: Normal rate and regular rhythm.      Heart sounds: No murmur heard.  Pulmonary:      Effort: Pulmonary effort is normal. No respiratory distress.      Breath sounds: Normal breath sounds. No wheezing or rales.   Abdominal:      General: Bowel sounds are normal. There is distension.      Palpations: There is no mass.      Tenderness: There is abdominal tenderness (mild RUQ). There is no guarding or rebound.   Musculoskeletal:         General: Normal range of motion.      Cervical back: Normal range of motion and neck supple.      Right lower leg: No edema.      Left lower leg: No edema.   Skin:     General: Skin is warm and dry.      Findings: No rash.   Neurological:      General: No focal deficit present.       Mental Status: She is alert and oriented to person, place, and time. Mental status is at baseline.   Psychiatric:         Mood and Affect: Mood is anxious.         Speech: Speech is not delayed.         Behavior: Behavior is not agitated or aggressive.         Thought Content: Thought content does not include suicidal ideation.         CRANIAL NERVES     CN III, IV, VI   Pupils are equal, round, and reactive to light.     Significant Labs: All pertinent labs within the past 24 hours have been reviewed.    Significant Imaging: I have reviewed all pertinent imaging results/findings within the past 24 hours.    Assessment/Plan:     * Alcohol withdrawal  Patient presented with 1 week of N/V/D.  Unclear if this is related to withdrawal at this time, but CIWA-AR is elevated and evidence of ketoacidosis on admission labs.  No abdominal pain.  Last drink was day PTA.  LFTs with T bili of 0.8, Alk phos of 63, AST/ALT 42/41.  Given Ativan 2mg in ED, in addition to IV fluids and anti-emetics.  CIWA this morning was 10.    Plan:  Continue with CIWA-Ar q 4 with IV Valium 10mg prn CIWA >8  IV fluids with LR at 100ml/hr  CLD and ADAT  Anti-emetics prn  Thiamine/Folic Acid/MVI daily  Seizure and Fall precautions  SW consult        Alcoholic ketoacidosis  AG 22 with beta-hb elevated at 4.4.  Glucose 69.  -Continue with IV fluids - consider change to D5 1/2 NS if needed  -Monitor closely for improvement      Diarrhea  Patient presented with 1 week of N/V/D.  No complaints of abdominal pain.  Has decreased appetite.  Still with nausea and last episode of diarrhea was day PTA.  Mostly described as loose to soft.  No watery BMs. Labs on admission with normal Lipase.  LFTs on admission with mild bump in AST at 42.  No imaging done in ED.  She was evaluated by GI on 4/18/2022 for Loose stools and bloating with Fecal incontinence - TTG, IgA normal at that time.  Pancreatic elastase pending.  She was recommended to start Fibercon 2  tabs daily.   -Send Stool C Diff, WBC, Culture  -Consider imaging  -Treat withdrawal as above      COPD (chronic obstructive pulmonary disease)  Uses Home Oxygen at night (2-3L).  Quit smoking in 2/2022 per Pulmonary Note.  Mild VINICIO - did not need CPAP.  History of Sarcoidosis - not on active treatment.  Home Meds:  Atrovent HFA, Stiolto Respimat  Last seen by Dr. Patel with Pulmonary in 12/2020.  No Tachypnea on admission.  Pox stable on 2L O2NC  Lungs clear.     Plan:  Continue Duonebs q6 prn  Continue O2 NC 2L  Follow closely      Obesity (BMI 30.0-34.9)  BMI 33.27 - patient would benefit for weight reduction      Type 2 diabetes mellitus with hyperglycemia  Home Meds:  Metformin XR 1g bid, Ozempic q7 days  A1C 6.6 in 3/2022.  -Hold Home Meds  -POCT Glucose qAC and HS  -Low-correction dose SSI prn  -Diabetic Diet      Hypothyroidism  TSH normal this morning.  -Continue Levothyroxine      Anemia  Hgb 12.1 on admission and overall stable  -Follow      Malignant neoplasm of central portion of right breast in female, estrogen receptor positive  Mammogram on September 4, 2020 showed a focal asymmetry in the retroareolar region of the right breast.  Ultrasound at that time showed a 9 x 5 x 8 mm hypoechoic mass at 12:00 p.m.  Biopsy on September 29, 2020 showed grade 2 infiltrating ductal carcinoma (histologic grade 3, nuclear grade 2, mitotic index 2).  Tumor was 95% ER positive 20-30% LA positive and HER2 was 2+ but negative by FISH.  Ki-67 was 80%.  On October 29, 2020 bilateral mastectomy and right axillary sentinel lymph node biopsy was performed.  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  Margins were negative with closest margin 6 mm.  The sentinel lymph node was negative. Final pathological staging T1b N0 stage IA.  -Continue Arimidex      Hyperlipidemia  -Continue Statin      Depression with anxiety  Home Meds:  Abilify 5mg, Cymbalta 60mg, Trazodone 300mg qHS  -Continue with home  meds    Essential hypertension  Home Meds:  Lisinopril 20mg once a day, HCTZ 25mg, and Metoprolol XL 50mg  -Continue Lisinopril and Metoprolol for now  -Monitor and adjust as needed      VTE Risk Mitigation (From admission, onward)         Ordered     heparin (porcine) injection 5,000 Units  Every 8 hours         04/29/22 1039     IP VTE HIGH RISK PATIENT  Once         04/29/22 1039     Place sequential compression device  Until discontinued         04/29/22 1039                   Stewart Hayden MD  Department of Hospital Medicine   [facility] - Emergency Dept

## 2022-04-30 LAB
ALBUMIN SERPL BCP-MCNC: 3.3 G/DL (ref 3.5–5.2)
ALP SERPL-CCNC: 58 U/L (ref 55–135)
ALT SERPL W/O P-5'-P-CCNC: 31 U/L (ref 10–44)
ANION GAP SERPL CALC-SCNC: 9 MMOL/L (ref 8–16)
AST SERPL-CCNC: 29 U/L (ref 10–40)
BASOPHILS # BLD AUTO: 0.01 K/UL (ref 0–0.2)
BASOPHILS NFR BLD: 0.2 % (ref 0–1.9)
BILIRUB SERPL-MCNC: 1 MG/DL (ref 0.1–1)
BUN SERPL-MCNC: 10 MG/DL (ref 8–23)
CALCIUM SERPL-MCNC: 8.8 MG/DL (ref 8.7–10.5)
CHLORIDE SERPL-SCNC: 100 MMOL/L (ref 95–110)
CO2 SERPL-SCNC: 28 MMOL/L (ref 23–29)
CREAT SERPL-MCNC: 0.8 MG/DL (ref 0.5–1.4)
DIFFERENTIAL METHOD: ABNORMAL
EOSINOPHIL # BLD AUTO: 0 K/UL (ref 0–0.5)
EOSINOPHIL NFR BLD: 0.2 % (ref 0–8)
ERYTHROCYTE [DISTWIDTH] IN BLOOD BY AUTOMATED COUNT: 18.9 % (ref 11.5–14.5)
EST. GFR  (AFRICAN AMERICAN): >60 ML/MIN/1.73 M^2
EST. GFR  (NON AFRICAN AMERICAN): >60 ML/MIN/1.73 M^2
GLUCOSE SERPL-MCNC: 120 MG/DL (ref 70–110)
HCT VFR BLD AUTO: 35.5 % (ref 37–48.5)
HGB BLD-MCNC: 11.1 G/DL (ref 12–16)
IMM GRANULOCYTES # BLD AUTO: 0.02 K/UL (ref 0–0.04)
IMM GRANULOCYTES NFR BLD AUTO: 0.4 % (ref 0–0.5)
LYMPHOCYTES # BLD AUTO: 1.7 K/UL (ref 1–4.8)
LYMPHOCYTES NFR BLD: 35.1 % (ref 18–48)
MCH RBC QN AUTO: 28.7 PG (ref 27–31)
MCHC RBC AUTO-ENTMCNC: 31.3 G/DL (ref 32–36)
MCV RBC AUTO: 92 FL (ref 82–98)
MONOCYTES # BLD AUTO: 0.4 K/UL (ref 0.3–1)
MONOCYTES NFR BLD: 7.6 % (ref 4–15)
NEUTROPHILS # BLD AUTO: 2.8 K/UL (ref 1.8–7.7)
NEUTROPHILS NFR BLD: 56.5 % (ref 38–73)
NRBC BLD-RTO: 0 /100 WBC
PLATELET # BLD AUTO: 76 K/UL (ref 150–450)
PMV BLD AUTO: 9.8 FL (ref 9.2–12.9)
POCT GLUCOSE: 125 MG/DL (ref 70–110)
POCT GLUCOSE: 139 MG/DL (ref 70–110)
POCT GLUCOSE: 150 MG/DL (ref 70–110)
POCT GLUCOSE: 158 MG/DL (ref 70–110)
POCT GLUCOSE: 163 MG/DL (ref 70–110)
POTASSIUM SERPL-SCNC: 4.3 MMOL/L (ref 3.5–5.1)
PROT SERPL-MCNC: 6.3 G/DL (ref 6–8.4)
RBC # BLD AUTO: 3.87 M/UL (ref 4–5.4)
SODIUM SERPL-SCNC: 137 MMOL/L (ref 136–145)
WBC # BLD AUTO: 4.9 K/UL (ref 3.9–12.7)

## 2022-04-30 PROCEDURE — 87040 BLOOD CULTURE FOR BACTERIA: CPT | Performed by: INTERNAL MEDICINE

## 2022-04-30 PROCEDURE — 85025 COMPLETE CBC W/AUTO DIFF WBC: CPT | Performed by: INTERNAL MEDICINE

## 2022-04-30 PROCEDURE — 27000207 HC ISOLATION

## 2022-04-30 PROCEDURE — 80053 COMPREHEN METABOLIC PANEL: CPT | Performed by: INTERNAL MEDICINE

## 2022-04-30 PROCEDURE — 63600175 PHARM REV CODE 636 W HCPCS: Performed by: INTERNAL MEDICINE

## 2022-04-30 PROCEDURE — 87077 CULTURE AEROBIC IDENTIFY: CPT | Performed by: INTERNAL MEDICINE

## 2022-04-30 PROCEDURE — 25000003 PHARM REV CODE 250: Performed by: INTERNAL MEDICINE

## 2022-04-30 PROCEDURE — 63600175 PHARM REV CODE 636 W HCPCS: Performed by: EMERGENCY MEDICINE

## 2022-04-30 PROCEDURE — 94761 N-INVAS EAR/PLS OXIMETRY MLT: CPT

## 2022-04-30 PROCEDURE — 21400001 HC TELEMETRY ROOM

## 2022-04-30 PROCEDURE — 99233 PR SUBSEQUENT HOSPITAL CARE,LEVL III: ICD-10-PCS | Mod: ,,, | Performed by: INTERNAL MEDICINE

## 2022-04-30 PROCEDURE — 87186 SC STD MICRODIL/AGAR DIL: CPT | Performed by: INTERNAL MEDICINE

## 2022-04-30 PROCEDURE — 36415 COLL VENOUS BLD VENIPUNCTURE: CPT | Performed by: INTERNAL MEDICINE

## 2022-04-30 PROCEDURE — 99233 SBSQ HOSP IP/OBS HIGH 50: CPT | Mod: ,,, | Performed by: INTERNAL MEDICINE

## 2022-04-30 RX ORDER — DICYCLOMINE HYDROCHLORIDE 10 MG/1
10 CAPSULE ORAL 4 TIMES DAILY
Status: DISCONTINUED | OUTPATIENT
Start: 2022-04-30 | End: 2022-05-04 | Stop reason: HOSPADM

## 2022-04-30 RX ORDER — HALOPERIDOL 5 MG/ML
1 INJECTION INTRAMUSCULAR ONCE AS NEEDED
Status: DISCONTINUED | OUTPATIENT
Start: 2022-05-01 | End: 2022-05-04 | Stop reason: HOSPADM

## 2022-04-30 RX ADMIN — ATORVASTATIN CALCIUM 10 MG: 10 TABLET, FILM COATED ORAL at 09:04

## 2022-04-30 RX ADMIN — METHOCARBAMOL TABLETS 750 MG: 750 TABLET, COATED ORAL at 09:04

## 2022-04-30 RX ADMIN — DIAZEPAM 10 MG: 5 INJECTION, SOLUTION INTRAMUSCULAR; INTRAVENOUS at 02:04

## 2022-04-30 RX ADMIN — DICYCLOMINE HYDROCHLORIDE 10 MG: 10 CAPSULE ORAL at 06:04

## 2022-04-30 RX ADMIN — GABAPENTIN 600 MG: 300 CAPSULE ORAL at 08:04

## 2022-04-30 RX ADMIN — FOLIC ACID 1 MG: 1 TABLET ORAL at 09:04

## 2022-04-30 RX ADMIN — METOPROLOL SUCCINATE 50 MG: 50 TABLET, EXTENDED RELEASE ORAL at 09:04

## 2022-04-30 RX ADMIN — PIPERACILLIN AND TAZOBACTAM 4.5 G: 4; .5 INJECTION, POWDER, LYOPHILIZED, FOR SOLUTION INTRAVENOUS; PARENTERAL at 10:04

## 2022-04-30 RX ADMIN — PANCRELIPASE 1 CAPSULE: 60000; 12000; 38000 CAPSULE, DELAYED RELEASE PELLETS ORAL at 06:04

## 2022-04-30 RX ADMIN — TRAZODONE HYDROCHLORIDE 300 MG: 100 TABLET ORAL at 10:04

## 2022-04-30 RX ADMIN — ARIPIPRAZOLE 5 MG: 5 TABLET ORAL at 09:04

## 2022-04-30 RX ADMIN — THIAMINE HCL TAB 100 MG 100 MG: 100 TAB at 09:04

## 2022-04-30 RX ADMIN — LISINOPRIL 20 MG: 20 TABLET ORAL at 09:04

## 2022-04-30 RX ADMIN — METHOCARBAMOL TABLETS 750 MG: 750 TABLET, COATED ORAL at 08:04

## 2022-04-30 RX ADMIN — HEPARIN SODIUM 5000 UNITS: 5000 INJECTION INTRAVENOUS; SUBCUTANEOUS at 02:04

## 2022-04-30 RX ADMIN — PANTOPRAZOLE SODIUM 40 MG: 40 TABLET, DELAYED RELEASE ORAL at 09:04

## 2022-04-30 RX ADMIN — PANCRELIPASE 1 CAPSULE: 60000; 12000; 38000 CAPSULE, DELAYED RELEASE PELLETS ORAL at 02:04

## 2022-04-30 RX ADMIN — ACETAMINOPHEN 650 MG: 325 TABLET, FILM COATED ORAL at 10:04

## 2022-04-30 RX ADMIN — ACETAMINOPHEN 650 MG: 325 TABLET, FILM COATED ORAL at 09:04

## 2022-04-30 RX ADMIN — PIPERACILLIN AND TAZOBACTAM 4.5 G: 4; .5 INJECTION, POWDER, LYOPHILIZED, FOR SOLUTION INTRAVENOUS; PARENTERAL at 02:04

## 2022-04-30 RX ADMIN — DULOXETINE 60 MG: 30 CAPSULE, DELAYED RELEASE ORAL at 09:04

## 2022-04-30 RX ADMIN — GABAPENTIN 600 MG: 300 CAPSULE ORAL at 09:04

## 2022-04-30 RX ADMIN — ANASTROZOLE 1 MG: 1 TABLET, COATED ORAL at 09:04

## 2022-04-30 RX ADMIN — ACETAMINOPHEN 650 MG: 325 TABLET, FILM COATED ORAL at 12:04

## 2022-04-30 RX ADMIN — HEPARIN SODIUM 5000 UNITS: 5000 INJECTION INTRAVENOUS; SUBCUTANEOUS at 10:04

## 2022-04-30 RX ADMIN — DICYCLOMINE HYDROCHLORIDE 10 MG: 10 CAPSULE ORAL at 08:04

## 2022-04-30 RX ADMIN — DICYCLOMINE HYDROCHLORIDE 10 MG: 10 CAPSULE ORAL at 12:04

## 2022-04-30 RX ADMIN — THERA TABS 1 TABLET: TAB at 09:04

## 2022-04-30 RX ADMIN — ROPINIROLE HYDROCHLORIDE 0.25 MG: 0.25 TABLET, FILM COATED ORAL at 10:04

## 2022-04-30 RX ADMIN — DIAZEPAM 10 MG: 5 INJECTION, SOLUTION INTRAMUSCULAR; INTRAVENOUS at 10:04

## 2022-04-30 RX ADMIN — SODIUM CHLORIDE, SODIUM LACTATE, POTASSIUM CHLORIDE, AND CALCIUM CHLORIDE: .6; .31; .03; .02 INJECTION, SOLUTION INTRAVENOUS at 01:04

## 2022-04-30 RX ADMIN — DIAZEPAM 10 MG: 5 INJECTION, SOLUTION INTRAMUSCULAR; INTRAVENOUS at 07:04

## 2022-04-30 RX ADMIN — ONDANSETRON 4 MG: 2 INJECTION INTRAMUSCULAR; INTRAVENOUS at 10:04

## 2022-04-30 RX ADMIN — METHOCARBAMOL TABLETS 750 MG: 750 TABLET, COATED ORAL at 02:04

## 2022-04-30 RX ADMIN — ONDANSETRON 4 MG: 2 INJECTION INTRAMUSCULAR; INTRAVENOUS at 05:04

## 2022-04-30 NOTE — ASSESSMENT & PLAN NOTE
Patient presented with 1 week of N/V/D.  Unclear if this is related to withdrawal at this time, but CIWA-AR is elevated and evidence of ketoacidosis on admission labs.  No abdominal pain.  Last drink was day PTA.  LFTs with T bili of 0.8, Alk phos of 63, AST/ALT 42/41.  Given Ativan 2mg in ED, in addition to IV fluids and anti-emetics.  CIWA this morning was 12.    Plan:  Continue with CIWA-Ar q 4 with IV Valium 10mg prn CIWA >8  Continue with IV fluids with LR at 100ml/hr  CLD and ADAT  Anti-emetics prn  Thiamine/Folic Acid/MVI daily  Seizure and Fall precautions  SW consult

## 2022-04-30 NOTE — ASSESSMENT & PLAN NOTE
Fever overnight on 4/29/2022.  Possibly from withdrawal, but unclear.  WBC normal, Procal and Lactic Acid normal.  No respiratory symptoms.  -Follow up Blood Cultures  -Follow up Stool Studies as below  -Check CXR  -Monitor off antibiotics for now

## 2022-04-30 NOTE — PROGRESS NOTES
[facility] Medicine  Progress Note    Patient Name: Earl Abdul  MRN: [id number]  Patient Class: IP- Inpatient   Admission Date: 4/29/2022  Length of Stay: 1 days  Attending Physician: Stewart Hayden MD  Primary Care Provider: Andrew Rodriguez MD        Subjective:     Principal Problem:Alcohol withdrawal    HPI:  Patient is a 61 year old female with a PMH significant for HTN, HLD, Depression (Suicide Attempts in past), Alcohol Abuse (drinks a half of a fifth of Whiskey per day) complicated with withdrawal seizures in past and pancreatitis , COPD (prn and qHS Oxygen of 2-3L),  HTN, HLD, Sarcoidosis of Lung (not active), Hepatic Steatosis, Hypothyroidism, PUD/Gastritis, Breast Cancer s/p double mastectomy and breast reconstruction who presented with N/V/D without abdominal pain for past week.  Patient states she started Restoril a week ago for insomnia, and since has developed N/V/D that she thought was due to the Restoril or withdrawing from Trazodone.  She restarted her Trazodone, but continued with these symptoms.  She denies abdominal pain.  No chest pain or SOB.  Patient last had a drink of alcohol yesterday, but was unable to keep most of it down.  No significant headache or change in vision.  No F/c.  No dysuria.  She was given IV NS x 1L in ED, as well as Zofran, Banana Bag, IV Thiamine, and Ativan 2MG IV.  CIWA-Ar at the time of my interview was 10.  Patient is being admitted for Alcohol Withdrawal and Ketoacidosis.      Overview/Hospital Course:  No notes on file    Interval History: Patient is awake and alert this morning.  Had a fever overnight.  Overall, no significant change since admission.  Still with nausea.  Some abdominal soreness.      Review of Systems   Constitutional:  Positive for appetite change (decreased) and fever. Negative for activity change, chills and fatigue.   HENT:  Negative for congestion and ear pain.    Eyes:  Negative for pain.    Respiratory:  Negative for cough, chest tightness and wheezing.    Cardiovascular:  Negative for chest pain, palpitations and leg swelling.   Gastrointestinal:  Positive for abdominal distention (soreness) and nausea (improved). Negative for abdominal pain, diarrhea, rectal pain and vomiting.   Endocrine: Negative for polydipsia, polyphagia and polyuria.   Genitourinary:  Negative for difficulty urinating, dysuria and pelvic pain.   Musculoskeletal:  Negative for neck pain.   Skin:  Negative for rash.   Neurological:  Negative for dizziness, tremors and headaches.   Hematological:  Does not bruise/bleed easily.   Psychiatric/Behavioral:  Positive for dysphoric mood and sleep disturbance. Negative for agitation, behavioral problems, confusion, decreased concentration and suicidal ideas.    Objective:     Vital Signs (Most Recent):  Temp: (!) 100.9 °F (38.3 °C) (04/30/22 0739)  Pulse: 88 (04/30/22 1000)  Resp: 20 (04/30/22 0739)  BP: (!) 122/58 (04/30/22 0739)  SpO2: (!) 92 % (04/30/22 0739)   Vital Signs (24h Range):  Temp:  [99.7 °F (37.6 °C)-101.5 °F (38.6 °C)] 100.9 °F (38.3 °C)  Pulse:  [] 88  Resp:  [16-33] 20  SpO2:  [92 %-100 %] 92 %  BP: (119-174)/(58-89) 122/58     Weight: 96 kg (211 lb 10.3 oz)  Body mass index is 34.16 kg/m².    Intake/Output Summary (Last 24 hours) at 4/30/2022 1106  Last data filed at 4/30/2022 0619  Gross per 24 hour   Intake 1752.44 ml   Output 700 ml   Net 1052.44 ml      Physical Exam  Constitutional:       General: She is not in acute distress.     Appearance: She is obese. She is ill-appearing (chronic). She is not toxic-appearing or diaphoretic.   HENT:      Head: Normocephalic and atraumatic.      Right Ear: External ear normal.      Left Ear: External ear normal.      Nose: Nose normal.      Mouth/Throat:      Mouth: Mucous membranes are moist.   Eyes:      General: No scleral icterus.     Pupils: Pupils are equal, round, and reactive to light.   Cardiovascular:       Rate and Rhythm: Normal rate and regular rhythm.      Heart sounds: No murmur heard.  Pulmonary:      Effort: Pulmonary effort is normal. No respiratory distress.      Breath sounds: Normal breath sounds. No wheezing or rales.   Abdominal:      General: Bowel sounds are normal. There is distension.      Palpations: There is no mass.      Tenderness: There is abdominal tenderness (mild RUQ). There is no guarding or rebound.   Musculoskeletal:         General: Normal range of motion.      Cervical back: Normal range of motion and neck supple.      Right lower leg: No edema.      Left lower leg: No edema.   Skin:     General: Skin is warm and dry.      Findings: No rash.   Neurological:      General: No focal deficit present.      Mental Status: She is alert and oriented to person, place, and time. Mental status is at baseline.   Psychiatric:         Mood and Affect: Mood is anxious.         Speech: Speech is not delayed.         Behavior: Behavior is not agitated or aggressive.         Thought Content: Thought content does not include suicidal ideation.       Significant Labs: All pertinent labs within the past 24 hours have been reviewed.    Significant Imaging: I have reviewed all pertinent imaging results/findings within the past 24 hours.      Assessment/Plan:      * Alcohol withdrawal  Patient presented with 1 week of N/V/D.  Unclear if this is related to withdrawal at this time, but CIWA-AR is elevated and evidence of ketoacidosis on admission labs.  No abdominal pain.  Last drink was day PTA.  LFTs with T bili of 0.8, Alk phos of 63, AST/ALT 42/41.  Given Ativan 2mg in ED, in addition to IV fluids and anti-emetics.  CIWA this morning was 12.    Plan:  Continue with CIWA-Ar q 4 with IV Valium 10mg prn CIWA >8  Continue with IV fluids with LR at 100ml/hr  CLD and ADAT  Anti-emetics prn  Thiamine/Folic Acid/MVI daily  Seizure and Fall precautions  SW consult        Fever  Fever overnight on 4/29/2022.  Possibly  from withdrawal, but unclear.  WBC normal, Procal and Lactic Acid normal.  No respiratory symptoms.  -Follow up Blood Cultures  -Follow up Stool Studies as below  -Check CXR  -Monitor off antibiotics for now      Alcoholic ketoacidosis  AG 22 with beta-hb elevated at 4.4.  Glucose 69.  -Continue with IV fluids - consider change to D5 1/2 NS if needed  -Monitor closely for improvement      Diarrhea  Patient presented with 1 week of N/V/D.  No complaints of abdominal pain.  Has decreased appetite.  Still with nausea and last episode of diarrhea was day PTA.  Mostly described as loose to soft.  No watery BMs. Labs on admission with normal Lipase.  LFTs on admission with mild bump in AST at 42.  No imaging done in ED.  She was evaluated by GI on 4/18/2022 for Loose stools and bloating with Fecal incontinence - TTG, IgA normal at that time.  Pancreatic elastase pending.  She was recommended to start Fibercon 2 tabs daily.   -Follow up Stool C Diff, WBC, Culture  -Consider imaging  -Treat withdrawal as above  -Start Pancreatic enzymes      COPD (chronic obstructive pulmonary disease)  Uses Home Oxygen at night (2-3L).  Quit smoking in 2/2022 per Pulmonary Note.  Mild VINICIO - did not need CPAP.  History of Sarcoidosis - not on active treatment.  Home Meds:  Atrovent HFA, Stiolto Respimat  Last seen by Dr. Patel with Pulmonary in 12/2020.  No Tachypnea on admission.  Pox stable on 2L O2NC  Lungs clear.     Plan:  Continue Duonebs q6 prn  Continue O2 NC 2L  CXR as above  Follow closely      Obesity (BMI 30.0-34.9)  BMI 33.27 - patient would benefit for weight reduction      Type 2 diabetes mellitus with hyperglycemia  Home Meds:  Metformin XR 1g bid, Ozempic q7 days  A1C 6.6 in 3/2022.  -Hold Home Meds  -POCT Glucose qAC and HS  -Low-correction dose SSI prn  -Diabetic Diet      Hypothyroidism  TSH normal on admission  -Continue Levothyroxine      Anemia  Hgb 12.1 on admission and overall stable  -Follow      Malignant neoplasm  of central portion of right breast in female, estrogen receptor positive  Mammogram on September 4, 2020 showed a focal asymmetry in the retroareolar region of the right breast.  Ultrasound at that time showed a 9 x 5 x 8 mm hypoechoic mass at 12:00 p.m.  Biopsy on September 29, 2020 showed grade 2 infiltrating ductal carcinoma (histologic grade 3, nuclear grade 2, mitotic index 2).  Tumor was 95% ER positive 20-30% AL positive and HER2 was 2+ but negative by FISH.  Ki-67 was 80%.  On October 29, 2020 bilateral mastectomy and right axillary sentinel lymph node biopsy was performed.  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  Margins were negative with closest margin 6 mm.  The sentinel lymph node was negative. Final pathological staging T1b N0 stage IA.  -Continue Arimidex      Hyperlipidemia  -Continue Statin      Depression with anxiety  Home Meds:  Abilify 5mg, Cymbalta 60mg, Trazodone 300mg qHS  -Continue with home meds    Essential hypertension  Home Meds:  Lisinopril 20mg once a day, HCTZ 25mg, and Metoprolol XL 50mg  -Continue Lisinopril and Metoprolol for now  -Monitor and adjust as needed        VTE Risk Mitigation (From admission, onward)         Ordered     Place sequential compression device  Until discontinued         04/29/22 1644     heparin (porcine) injection 5,000 Units  Every 8 hours         04/29/22 1039     IP VTE HIGH RISK PATIENT  Once         04/29/22 1039     Place sequential compression device  Until discontinued         04/29/22 1039                Discharge Planning   BECCA:      Code Status: Full Code   Is the patient medically ready for discharge?:     Reason for patient still in hospital (select all that apply): Patient trending condition and Treatment                     Stewart Hayden MD  Department of Hospital Medicine   [facility] ([facility])

## 2022-04-30 NOTE — ASSESSMENT & PLAN NOTE
Mammogram on September 4, 2020 showed a focal asymmetry in the retroareolar region of the right breast.  Ultrasound at that time showed a 9 x 5 x 8 mm hypoechoic mass at 12:00 p.m.  Biopsy on September 29, 2020 showed grade 2 infiltrating ductal carcinoma (histologic grade 3, nuclear grade 2, mitotic index 2).  Tumor was 95% ER positive 20-30% MO positive and HER2 was 2+ but negative by FISH.  Ki-67 was 80%.  On October 29, 2020 bilateral mastectomy and right axillary sentinel lymph node biopsy was performed.  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  Margins were negative with closest margin 6 mm.  The sentinel lymph node was negative. Final pathological staging T1b N0 stage IA.  -Continue Arimidex

## 2022-04-30 NOTE — SUBJECTIVE & OBJECTIVE
Interval History: Patient is awake and alert this morning.  Had a fever overnight.  Overall, no significant change since admission.  Still with nausea.  Some abdominal soreness.      Review of Systems   Constitutional:  Positive for appetite change (decreased) and fever. Negative for activity change, chills and fatigue.   HENT:  Negative for congestion and ear pain.    Eyes:  Negative for pain.   Respiratory:  Negative for cough, chest tightness and wheezing.    Cardiovascular:  Negative for chest pain, palpitations and leg swelling.   Gastrointestinal:  Positive for abdominal distention (soreness) and nausea (improved). Negative for abdominal pain, diarrhea, rectal pain and vomiting.   Endocrine: Negative for polydipsia, polyphagia and polyuria.   Genitourinary:  Negative for difficulty urinating, dysuria and pelvic pain.   Musculoskeletal:  Negative for neck pain.   Skin:  Negative for rash.   Neurological:  Negative for dizziness, tremors and headaches.   Hematological:  Does not bruise/bleed easily.   Psychiatric/Behavioral:  Positive for dysphoric mood and sleep disturbance. Negative for agitation, behavioral problems, confusion, decreased concentration and suicidal ideas.    Objective:     Vital Signs (Most Recent):  Temp: (!) 100.9 °F (38.3 °C) (04/30/22 0739)  Pulse: 88 (04/30/22 1000)  Resp: 20 (04/30/22 0739)  BP: (!) 122/58 (04/30/22 0739)  SpO2: (!) 92 % (04/30/22 0739)   Vital Signs (24h Range):  Temp:  [99.7 °F (37.6 °C)-101.5 °F (38.6 °C)] 100.9 °F (38.3 °C)  Pulse:  [] 88  Resp:  [16-33] 20  SpO2:  [92 %-100 %] 92 %  BP: (119-174)/(58-89) 122/58     Weight: 96 kg (211 lb 10.3 oz)  Body mass index is 34.16 kg/m².    Intake/Output Summary (Last 24 hours) at 4/30/2022 1106  Last data filed at 4/30/2022 0619  Gross per 24 hour   Intake 1752.44 ml   Output 700 ml   Net 1052.44 ml      Physical Exam  Constitutional:       General: She is not in acute distress.     Appearance: She is obese. She is  ill-appearing (chronic). She is not toxic-appearing or diaphoretic.   HENT:      Head: Normocephalic and atraumatic.      Right Ear: External ear normal.      Left Ear: External ear normal.      Nose: Nose normal.      Mouth/Throat:      Mouth: Mucous membranes are moist.   Eyes:      General: No scleral icterus.     Pupils: Pupils are equal, round, and reactive to light.   Cardiovascular:      Rate and Rhythm: Normal rate and regular rhythm.      Heart sounds: No murmur heard.  Pulmonary:      Effort: Pulmonary effort is normal. No respiratory distress.      Breath sounds: Normal breath sounds. No wheezing or rales.   Abdominal:      General: Bowel sounds are normal. There is distension.      Palpations: There is no mass.      Tenderness: There is abdominal tenderness (mild RUQ). There is no guarding or rebound.   Musculoskeletal:         General: Normal range of motion.      Cervical back: Normal range of motion and neck supple.      Right lower leg: No edema.      Left lower leg: No edema.   Skin:     General: Skin is warm and dry.      Findings: No rash.   Neurological:      General: No focal deficit present.      Mental Status: She is alert and oriented to person, place, and time. Mental status is at baseline.   Psychiatric:         Mood and Affect: Mood is anxious.         Speech: Speech is not delayed.         Behavior: Behavior is not agitated or aggressive.         Thought Content: Thought content does not include suicidal ideation.       Significant Labs: All pertinent labs within the past 24 hours have been reviewed.    Significant Imaging: I have reviewed all pertinent imaging results/findings within the past 24 hours.

## 2022-04-30 NOTE — ASSESSMENT & PLAN NOTE
Uses Home Oxygen at night (2-3L).  Quit smoking in 2/2022 per Pulmonary Note.  Mild VINICIO - did not need CPAP.  History of Sarcoidosis - not on active treatment.  Home Meds:  Atrovent HFA, Stiolto Respimat  Last seen by Dr. Patel with Pulmonary in 12/2020.  No Tachypnea on admission.  Pox stable on 2L O2NC  Lungs clear.     Plan:  Continue Duonebs q6 prn  Continue O2 NC 2L  CXR as above  Follow closely

## 2022-04-30 NOTE — ASSESSMENT & PLAN NOTE
Patient presented with 1 week of N/V/D.  No complaints of abdominal pain.  Has decreased appetite.  Still with nausea and last episode of diarrhea was day PTA.  Mostly described as loose to soft.  No watery BMs. Labs on admission with normal Lipase.  LFTs on admission with mild bump in AST at 42.  No imaging done in ED.  She was evaluated by GI on 4/18/2022 for Loose stools and bloating with Fecal incontinence - TTG, IgA normal at that time.  Pancreatic elastase pending.  She was recommended to start Fibercon 2 tabs daily.   -Follow up Stool C Diff, WBC, Culture  -Consider imaging  -Treat withdrawal as above  -Start Pancreatic enzymes

## 2022-04-30 NOTE — PLAN OF CARE
Patient is AAOx4. Patient had episode of emesis overnight, medications were administered per orders. CIWA assessed q4. Patient up to bedside commode with stand-by assistance. Temperature elevated and medications given with relief. VSS on 2L NC. Patient resting comfortably at present. Bed locked in lowest position with side rails up x 2. Call light within reach of patient. Will continue purposeful rounding.

## 2022-05-01 LAB
ALBUMIN SERPL BCP-MCNC: 3 G/DL (ref 3.5–5.2)
ALP SERPL-CCNC: 52 U/L (ref 55–135)
ALT SERPL W/O P-5'-P-CCNC: 38 U/L (ref 10–44)
ANION GAP SERPL CALC-SCNC: 12 MMOL/L (ref 8–16)
AST SERPL-CCNC: 56 U/L (ref 10–40)
BASOPHILS # BLD AUTO: 0.01 K/UL (ref 0–0.2)
BASOPHILS NFR BLD: 0.2 % (ref 0–1.9)
BILIRUB SERPL-MCNC: 1 MG/DL (ref 0.1–1)
BUN SERPL-MCNC: 8 MG/DL (ref 8–23)
CALCIUM SERPL-MCNC: 8.7 MG/DL (ref 8.7–10.5)
CHLORIDE SERPL-SCNC: 101 MMOL/L (ref 95–110)
CO2 SERPL-SCNC: 24 MMOL/L (ref 23–29)
CREAT SERPL-MCNC: 0.9 MG/DL (ref 0.5–1.4)
DIFFERENTIAL METHOD: ABNORMAL
EOSINOPHIL # BLD AUTO: 0 K/UL (ref 0–0.5)
EOSINOPHIL NFR BLD: 0 % (ref 0–8)
ERYTHROCYTE [DISTWIDTH] IN BLOOD BY AUTOMATED COUNT: 18.6 % (ref 11.5–14.5)
EST. GFR  (AFRICAN AMERICAN): >60 ML/MIN/1.73 M^2
EST. GFR  (NON AFRICAN AMERICAN): >60 ML/MIN/1.73 M^2
GLUCOSE SERPL-MCNC: 114 MG/DL (ref 70–110)
HCT VFR BLD AUTO: 33.1 % (ref 37–48.5)
HGB BLD-MCNC: 10.3 G/DL (ref 12–16)
IMM GRANULOCYTES # BLD AUTO: 0.02 K/UL (ref 0–0.04)
IMM GRANULOCYTES NFR BLD AUTO: 0.4 % (ref 0–0.5)
LYMPHOCYTES # BLD AUTO: 1.5 K/UL (ref 1–4.8)
LYMPHOCYTES NFR BLD: 29.8 % (ref 18–48)
MCH RBC QN AUTO: 28.5 PG (ref 27–31)
MCHC RBC AUTO-ENTMCNC: 31.1 G/DL (ref 32–36)
MCV RBC AUTO: 92 FL (ref 82–98)
MONOCYTES # BLD AUTO: 0.6 K/UL (ref 0.3–1)
MONOCYTES NFR BLD: 13 % (ref 4–15)
NEUTROPHILS # BLD AUTO: 2.8 K/UL (ref 1.8–7.7)
NEUTROPHILS NFR BLD: 56.6 % (ref 38–73)
NRBC BLD-RTO: 0 /100 WBC
PLATELET # BLD AUTO: 65 K/UL (ref 150–450)
PMV BLD AUTO: 9.8 FL (ref 9.2–12.9)
POCT GLUCOSE: 123 MG/DL (ref 70–110)
POCT GLUCOSE: 123 MG/DL (ref 70–110)
POCT GLUCOSE: 133 MG/DL (ref 70–110)
POTASSIUM SERPL-SCNC: 4.4 MMOL/L (ref 3.5–5.1)
PROT SERPL-MCNC: 6.3 G/DL (ref 6–8.4)
RBC # BLD AUTO: 3.61 M/UL (ref 4–5.4)
SODIUM SERPL-SCNC: 137 MMOL/L (ref 136–145)
WBC # BLD AUTO: 4.93 K/UL (ref 3.9–12.7)

## 2022-05-01 PROCEDURE — 85025 COMPLETE CBC W/AUTO DIFF WBC: CPT | Performed by: INTERNAL MEDICINE

## 2022-05-01 PROCEDURE — 76937 US GUIDE VASCULAR ACCESS: CPT

## 2022-05-01 PROCEDURE — 87077 CULTURE AEROBIC IDENTIFY: CPT | Performed by: INTERNAL MEDICINE

## 2022-05-01 PROCEDURE — 25000003 PHARM REV CODE 250: Performed by: INTERNAL MEDICINE

## 2022-05-01 PROCEDURE — 36410 VNPNXR 3YR/> PHY/QHP DX/THER: CPT

## 2022-05-01 PROCEDURE — 94761 N-INVAS EAR/PLS OXIMETRY MLT: CPT

## 2022-05-01 PROCEDURE — C1751 CATH, INF, PER/CENT/MIDLINE: HCPCS

## 2022-05-01 PROCEDURE — 80053 COMPREHEN METABOLIC PANEL: CPT | Performed by: INTERNAL MEDICINE

## 2022-05-01 PROCEDURE — A9698 NON-RAD CONTRAST MATERIALNOC: HCPCS | Performed by: INTERNAL MEDICINE

## 2022-05-01 PROCEDURE — 25500020 PHARM REV CODE 255: Performed by: INTERNAL MEDICINE

## 2022-05-01 PROCEDURE — 63600175 PHARM REV CODE 636 W HCPCS: Performed by: EMERGENCY MEDICINE

## 2022-05-01 PROCEDURE — 27000207 HC ISOLATION

## 2022-05-01 PROCEDURE — 99233 PR SUBSEQUENT HOSPITAL CARE,LEVL III: ICD-10-PCS | Mod: ,,, | Performed by: INTERNAL MEDICINE

## 2022-05-01 PROCEDURE — 87040 BLOOD CULTURE FOR BACTERIA: CPT | Performed by: INTERNAL MEDICINE

## 2022-05-01 PROCEDURE — 63600175 PHARM REV CODE 636 W HCPCS: Performed by: INTERNAL MEDICINE

## 2022-05-01 PROCEDURE — 36415 COLL VENOUS BLD VENIPUNCTURE: CPT | Performed by: INTERNAL MEDICINE

## 2022-05-01 PROCEDURE — 99233 SBSQ HOSP IP/OBS HIGH 50: CPT | Mod: ,,, | Performed by: INTERNAL MEDICINE

## 2022-05-01 PROCEDURE — 21400001 HC TELEMETRY ROOM

## 2022-05-01 RX ORDER — OXYCODONE HYDROCHLORIDE 5 MG/1
5 TABLET ORAL EVERY 6 HOURS PRN
Status: DISCONTINUED | OUTPATIENT
Start: 2022-05-01 | End: 2022-05-04 | Stop reason: HOSPADM

## 2022-05-01 RX ORDER — IBUPROFEN 200 MG
1 TABLET ORAL DAILY
Status: DISCONTINUED | OUTPATIENT
Start: 2022-05-02 | End: 2022-05-04 | Stop reason: HOSPADM

## 2022-05-01 RX ADMIN — ROPINIROLE HYDROCHLORIDE 0.25 MG: 0.25 TABLET, FILM COATED ORAL at 10:05

## 2022-05-01 RX ADMIN — METHOCARBAMOL TABLETS 750 MG: 750 TABLET, COATED ORAL at 03:05

## 2022-05-01 RX ADMIN — ATORVASTATIN CALCIUM 10 MG: 10 TABLET, FILM COATED ORAL at 09:05

## 2022-05-01 RX ADMIN — PIPERACILLIN AND TAZOBACTAM 4.5 G: 4; .5 INJECTION, POWDER, LYOPHILIZED, FOR SOLUTION INTRAVENOUS; PARENTERAL at 06:05

## 2022-05-01 RX ADMIN — SODIUM CHLORIDE, SODIUM LACTATE, POTASSIUM CHLORIDE, AND CALCIUM CHLORIDE: .6; .31; .03; .02 INJECTION, SOLUTION INTRAVENOUS at 11:05

## 2022-05-01 RX ADMIN — PIPERACILLIN AND TAZOBACTAM 4.5 G: 4; .5 INJECTION, POWDER, LYOPHILIZED, FOR SOLUTION INTRAVENOUS; PARENTERAL at 11:05

## 2022-05-01 RX ADMIN — IOHEXOL 1000 ML: 9 SOLUTION ORAL at 09:05

## 2022-05-01 RX ADMIN — METOPROLOL SUCCINATE 50 MG: 50 TABLET, EXTENDED RELEASE ORAL at 09:05

## 2022-05-01 RX ADMIN — HEPARIN SODIUM 5000 UNITS: 5000 INJECTION INTRAVENOUS; SUBCUTANEOUS at 10:05

## 2022-05-01 RX ADMIN — ACETAMINOPHEN 650 MG: 325 TABLET, FILM COATED ORAL at 10:05

## 2022-05-01 RX ADMIN — PIPERACILLIN AND TAZOBACTAM 4.5 G: 4; .5 INJECTION, POWDER, LYOPHILIZED, FOR SOLUTION INTRAVENOUS; PARENTERAL at 03:05

## 2022-05-01 RX ADMIN — ACETAMINOPHEN 650 MG: 325 TABLET, FILM COATED ORAL at 05:05

## 2022-05-01 RX ADMIN — THIAMINE HCL TAB 100 MG 100 MG: 100 TAB at 09:05

## 2022-05-01 RX ADMIN — DICYCLOMINE HYDROCHLORIDE 10 MG: 10 CAPSULE ORAL at 01:05

## 2022-05-01 RX ADMIN — METHOCARBAMOL TABLETS 750 MG: 750 TABLET, COATED ORAL at 10:05

## 2022-05-01 RX ADMIN — FOLIC ACID 1 MG: 1 TABLET ORAL at 09:05

## 2022-05-01 RX ADMIN — HEPARIN SODIUM 5000 UNITS: 5000 INJECTION INTRAVENOUS; SUBCUTANEOUS at 01:05

## 2022-05-01 RX ADMIN — ONDANSETRON 4 MG: 2 INJECTION INTRAMUSCULAR; INTRAVENOUS at 07:05

## 2022-05-01 RX ADMIN — LISINOPRIL 20 MG: 20 TABLET ORAL at 09:05

## 2022-05-01 RX ADMIN — DICYCLOMINE HYDROCHLORIDE 10 MG: 10 CAPSULE ORAL at 09:05

## 2022-05-01 RX ADMIN — THERA TABS 1 TABLET: TAB at 09:05

## 2022-05-01 RX ADMIN — ONDANSETRON 4 MG: 2 INJECTION INTRAMUSCULAR; INTRAVENOUS at 05:05

## 2022-05-01 RX ADMIN — DULOXETINE 60 MG: 30 CAPSULE, DELAYED RELEASE ORAL at 09:05

## 2022-05-01 RX ADMIN — ANASTROZOLE 1 MG: 1 TABLET, COATED ORAL at 09:05

## 2022-05-01 RX ADMIN — PANCRELIPASE 1 CAPSULE: 60000; 12000; 38000 CAPSULE, DELAYED RELEASE PELLETS ORAL at 01:05

## 2022-05-01 RX ADMIN — PANCRELIPASE 1 CAPSULE: 60000; 12000; 38000 CAPSULE, DELAYED RELEASE PELLETS ORAL at 09:05

## 2022-05-01 RX ADMIN — DICYCLOMINE HYDROCHLORIDE 10 MG: 10 CAPSULE ORAL at 05:05

## 2022-05-01 RX ADMIN — DICYCLOMINE HYDROCHLORIDE 10 MG: 10 CAPSULE ORAL at 10:05

## 2022-05-01 RX ADMIN — PANCRELIPASE 1 CAPSULE: 60000; 12000; 38000 CAPSULE, DELAYED RELEASE PELLETS ORAL at 05:05

## 2022-05-01 RX ADMIN — GABAPENTIN 600 MG: 300 CAPSULE ORAL at 09:05

## 2022-05-01 RX ADMIN — LEVOTHYROXINE SODIUM 50 MCG: 50 TABLET ORAL at 06:05

## 2022-05-01 RX ADMIN — ACETAMINOPHEN 650 MG: 325 TABLET, FILM COATED ORAL at 09:05

## 2022-05-01 RX ADMIN — GABAPENTIN 600 MG: 300 CAPSULE ORAL at 10:05

## 2022-05-01 RX ADMIN — PANTOPRAZOLE SODIUM 40 MG: 40 TABLET, DELAYED RELEASE ORAL at 09:05

## 2022-05-01 RX ADMIN — DIAZEPAM 10 MG: 5 INJECTION, SOLUTION INTRAMUSCULAR; INTRAVENOUS at 03:05

## 2022-05-01 RX ADMIN — DIAZEPAM 10 MG: 5 INJECTION, SOLUTION INTRAMUSCULAR; INTRAVENOUS at 08:05

## 2022-05-01 RX ADMIN — HEPARIN SODIUM 5000 UNITS: 5000 INJECTION INTRAVENOUS; SUBCUTANEOUS at 06:05

## 2022-05-01 RX ADMIN — TRAZODONE HYDROCHLORIDE 300 MG: 100 TABLET ORAL at 10:05

## 2022-05-01 RX ADMIN — OXYCODONE 5 MG: 5 TABLET ORAL at 06:05

## 2022-05-01 RX ADMIN — IOHEXOL 100 ML: 350 INJECTION, SOLUTION INTRAVENOUS at 11:05

## 2022-05-01 NOTE — PLAN OF CARE
Problem: Adult Inpatient Plan of Care  Goal: Plan of Care Review  Outcome: Ongoing, Progressing  Goal: Absence of Hospital-Acquired Illness or Injury  Outcome: Ongoing, Progressing  Goal: Optimal Comfort and Wellbeing  Outcome: Ongoing, Progressing     Problem: Diabetes Comorbidity  Goal: Blood Glucose Level Within Targeted Range  Outcome: Ongoing, Progressing     Problem: Infection  Goal: Absence of Infection Signs and Symptoms  Outcome: Ongoing, Progressing     Problem: Skin Injury Risk Increased  Goal: Skin Health and Integrity  Outcome: Ongoing, Progressing

## 2022-05-01 NOTE — SUBJECTIVE & OBJECTIVE
Interval History: Patient is awake and alert this morning.  Had a fever again overnight.  Overall, no significant change since admission.  Still with nausea.  Some abdominal soreness.  Now with bacteremia.    Review of Systems   Constitutional:  Positive for appetite change (decreased) and fever. Negative for activity change, chills and fatigue.   HENT:  Negative for congestion and ear pain.    Eyes:  Negative for pain.   Respiratory:  Negative for cough, chest tightness and wheezing.    Cardiovascular:  Negative for chest pain, palpitations and leg swelling.   Gastrointestinal:  Positive for abdominal distention (soreness) and nausea (improved). Negative for abdominal pain, diarrhea, rectal pain and vomiting.   Endocrine: Negative for polydipsia, polyphagia and polyuria.   Genitourinary:  Negative for difficulty urinating, dysuria and pelvic pain.   Musculoskeletal:  Negative for neck pain.   Skin:  Negative for rash.   Neurological:  Negative for dizziness, tremors and headaches.   Hematological:  Does not bruise/bleed easily.   Psychiatric/Behavioral:  Positive for dysphoric mood and sleep disturbance. Negative for agitation, behavioral problems, confusion, decreased concentration and suicidal ideas.    Objective:     Vital Signs (Most Recent):  Temp: (!) 103 °F (39.4 °C) (05/01/22 0916)  Pulse: 88 (05/01/22 1000)  Resp: 20 (05/01/22 0758)  BP: (!) 164/72 (05/01/22 0758)  SpO2: 96 % (05/01/22 0758)   Vital Signs (24h Range):  Temp:  [98 °F (36.7 °C)-103 °F (39.4 °C)] 103 °F (39.4 °C)  Pulse:  [] 88  Resp:  [16-20] 20  SpO2:  [92 %-98 %] 96 %  BP: (115-164)/(56-77) 164/72     Weight: 96 kg (211 lb 10.3 oz)  Body mass index is 34.16 kg/m².    Intake/Output Summary (Last 24 hours) at 5/1/2022 1103  Last data filed at 5/1/2022 0647  Gross per 24 hour   Intake 1200 ml   Output 1400 ml   Net -200 ml        Physical Exam  Constitutional:       General: She is not in acute distress.     Appearance: She is obese.  She is ill-appearing (chronic). She is not toxic-appearing or diaphoretic.   HENT:      Head: Normocephalic and atraumatic.      Right Ear: External ear normal.      Left Ear: External ear normal.      Nose: Nose normal.      Mouth/Throat:      Mouth: Mucous membranes are moist.   Eyes:      General: No scleral icterus.     Pupils: Pupils are equal, round, and reactive to light.   Cardiovascular:      Rate and Rhythm: Normal rate and regular rhythm.      Heart sounds: No murmur heard.  Pulmonary:      Effort: Pulmonary effort is normal. No respiratory distress.      Breath sounds: Normal breath sounds. No wheezing or rales.   Abdominal:      General: Bowel sounds are normal. There is distension.      Palpations: There is no mass.      Tenderness: There is abdominal tenderness (mild RUQ). There is no guarding or rebound.   Musculoskeletal:         General: Normal range of motion.      Cervical back: Normal range of motion and neck supple.      Right lower leg: No edema.      Left lower leg: No edema.   Skin:     General: Skin is warm and dry.      Findings: No rash.   Neurological:      General: No focal deficit present.      Mental Status: She is alert and oriented to person, place, and time. Mental status is at baseline.   Psychiatric:         Mood and Affect: Mood is anxious.         Speech: Speech is not delayed.         Behavior: Behavior is not agitated or aggressive.         Thought Content: Thought content does not include suicidal ideation.       Significant Labs: All pertinent labs within the past 24 hours have been reviewed.    Significant Imaging: I have reviewed all pertinent imaging results/findings within the past 24 hours.

## 2022-05-01 NOTE — ASSESSMENT & PLAN NOTE
Fever overnight on 4/29/2022 and again yesterday.  WBC normal, Procal and Lactic Acid normal.  Blood cultures on 4/29/2022 with 2/4 bottles of GNR - source likely urinary given Urine Culture growing GNR as well (E coli).  No respiratory symptoms.    -Follow up Blood Culture results - repeat set sent on 4/30/2022  -Follow up Stool Studies as below  -Follow u p CXR and CT A/P - both ordered on 4/29/2022 and not done  -Monitor off antibiotics for now

## 2022-05-01 NOTE — PLAN OF CARE
05/01/22 1340   Discharge Assessment   Assessment Type Discharge Planning Assessment   Confirmed/corrected address, phone number and insurance Yes   Confirmed Demographics Correct on Facesheet   Source of Information patient   Does patient/caregiver understand observation status Yes   Communicated BECCA with patient/caregiver Yes;Date not available/Unable to determine   Reason For Admission Alcohol ketoacidosis   Lives With spouse   Do you expect to return to your current living situation? Yes   Do you have help at home or someone to help you manage your care at home? Yes   Who are your caregiver(s) and their phone number(s)? Spouse   Prior to hospitilization cognitive status: Alert/Oriented   Current cognitive status: Alert/Oriented   Walking or Climbing Stairs Difficulty none   Dressing/Bathing Difficulty none   Equipment Currently Used at Home none   Readmission within 30 days? No   Patient currently being followed by outpatient case management? No   Do you currently have service(s) that help you manage your care at home? No   Do you take prescription medications? Yes   Who is going to help you get home at discharge? Spouse   Are you on dialysis? No   Do you take coumadin? No   Discharge Plan A Home   Discharge Plan B Home with family   Discharge Barriers Identified None

## 2022-05-01 NOTE — ASSESSMENT & PLAN NOTE
Mammogram on September 4, 2020 showed a focal asymmetry in the retroareolar region of the right breast.  Ultrasound at that time showed a 9 x 5 x 8 mm hypoechoic mass at 12:00 p.m.  Biopsy on September 29, 2020 showed grade 2 infiltrating ductal carcinoma (histologic grade 3, nuclear grade 2, mitotic index 2).  Tumor was 95% ER positive 20-30% FL positive and HER2 was 2+ but negative by FISH.  Ki-67 was 80%.  On October 29, 2020 bilateral mastectomy and right axillary sentinel lymph node biopsy was performed.  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  Margins were negative with closest margin 6 mm.  The sentinel lymph node was negative. Final pathological staging T1b N0 stage IA.  -Continue Arimidex

## 2022-05-01 NOTE — NURSING
Overnight pt lost IV access; (occluded). New 22g placed in left hand. Poor venous access. Shu Harmon PA-C on call notified. New order placed for midline consult. Midline placed 5/1 around 0230 by PICC nurse. PETRA Harmon notified. CT ED notified pt iv situation and that she has been NPO since 0345. Adam in CT states pt will begin PO CT contrast between 0700 and 0800. PETRA Harmon and overnight charge nurse notified. Vape pen also found in pt bed upon assisting her to the bedside commode. Vape pen was placed in pt belongings bag and given to security .

## 2022-05-01 NOTE — ASSESSMENT & PLAN NOTE
Patient presented with 1 week of N/V/D.  Unclear if this is related to withdrawal at this time, but CIWA-AR is elevated and evidence of ketoacidosis on admission labs.  No abdominal pain.  Last drink was day PTA.  LFTs with T bili of 0.8, Alk phos of 63, AST/ALT 42/41.  Given Ativan 2mg in ED, in addition to IV fluids and anti-emetics.  Received 3 dose of IV Valium yesterday.  CIWA this morning was 12 and given another dose.    Plan:  Continue with CIWA-Ar q 4 with IV Valium 10mg prn CIWA >8  Continue with IV fluids with LR at 100ml/hr  CLD and ADAT  Anti-emetics prn  Thiamine/Folic Acid/MVI daily  Seizure and Fall precautions  SW consult

## 2022-05-01 NOTE — PROGRESS NOTES
[facility] Medicine  Progress Note    Patient Name: Earl Abdul  MRN: [id number]  Patient Class: IP- Inpatient   Admission Date: 4/29/2022  Length of Stay: 2 days  Attending Physician: Stewart Hayden MD  Primary Care Provider: Andrew Rodriguez MD        Subjective:     Principal Problem:Alcohol withdrawal    HPI:  Patient is a 61 year old female with a PMH significant for HTN, HLD, Depression (Suicide Attempts in past), Alcohol Abuse (drinks a half of a fifth of Whiskey per day) complicated with withdrawal seizures in past and pancreatitis , COPD (prn and qHS Oxygen of 2-3L),  HTN, HLD, Sarcoidosis of Lung (not active), Hepatic Steatosis, Hypothyroidism, PUD/Gastritis, Breast Cancer s/p double mastectomy and breast reconstruction who presented with N/V/D without abdominal pain for past week.  Patient states she started Restoril a week ago for insomnia, and since has developed N/V/D that she thought was due to the Restoril or withdrawing from Trazodone.  She restarted her Trazodone, but continued with these symptoms.  She denies abdominal pain.  No chest pain or SOB.  Patient last had a drink of alcohol yesterday, but was unable to keep most of it down.  No significant headache or change in vision.  No F/c.  No dysuria.  She was given IV NS x 1L in ED, as well as Zofran, Banana Bag, IV Thiamine, and Ativan 2MG IV.  CIWA-Ar at the time of my interview was 10.  Patient is being admitted for Alcohol Withdrawal and Ketoacidosis.      Overview/Hospital Course:  No notes on file    Interval History: Patient is awake and alert this morning.  Had a fever again overnight.  Overall, no significant change since admission.  Still with nausea.  Some abdominal soreness.  Now with bacteremia.    Review of Systems   Constitutional:  Positive for appetite change (decreased) and fever. Negative for activity change, chills and fatigue.   HENT:  Negative for congestion and ear pain.    Eyes:   Negative for pain.   Respiratory:  Negative for cough, chest tightness and wheezing.    Cardiovascular:  Negative for chest pain, palpitations and leg swelling.   Gastrointestinal:  Positive for abdominal distention (soreness) and nausea (improved). Negative for abdominal pain, diarrhea, rectal pain and vomiting.   Endocrine: Negative for polydipsia, polyphagia and polyuria.   Genitourinary:  Negative for difficulty urinating, dysuria and pelvic pain.   Musculoskeletal:  Negative for neck pain.   Skin:  Negative for rash.   Neurological:  Negative for dizziness, tremors and headaches.   Hematological:  Does not bruise/bleed easily.   Psychiatric/Behavioral:  Positive for dysphoric mood and sleep disturbance. Negative for agitation, behavioral problems, confusion, decreased concentration and suicidal ideas.    Objective:     Vital Signs (Most Recent):  Temp: (!) 103 °F (39.4 °C) (05/01/22 0916)  Pulse: 88 (05/01/22 1000)  Resp: 20 (05/01/22 0758)  BP: (!) 164/72 (05/01/22 0758)  SpO2: 96 % (05/01/22 0758)   Vital Signs (24h Range):  Temp:  [98 °F (36.7 °C)-103 °F (39.4 °C)] 103 °F (39.4 °C)  Pulse:  [] 88  Resp:  [16-20] 20  SpO2:  [92 %-98 %] 96 %  BP: (115-164)/(56-77) 164/72     Weight: 96 kg (211 lb 10.3 oz)  Body mass index is 34.16 kg/m².    Intake/Output Summary (Last 24 hours) at 5/1/2022 1103  Last data filed at 5/1/2022 0647  Gross per 24 hour   Intake 1200 ml   Output 1400 ml   Net -200 ml        Physical Exam  Constitutional:       General: She is not in acute distress.     Appearance: She is obese. She is ill-appearing (chronic). She is not toxic-appearing or diaphoretic.   HENT:      Head: Normocephalic and atraumatic.      Right Ear: External ear normal.      Left Ear: External ear normal.      Nose: Nose normal.      Mouth/Throat:      Mouth: Mucous membranes are moist.   Eyes:      General: No scleral icterus.     Pupils: Pupils are equal, round, and reactive to light.   Cardiovascular:       Rate and Rhythm: Normal rate and regular rhythm.      Heart sounds: No murmur heard.  Pulmonary:      Effort: Pulmonary effort is normal. No respiratory distress.      Breath sounds: Normal breath sounds. No wheezing or rales.   Abdominal:      General: Bowel sounds are normal. There is distension.      Palpations: There is no mass.      Tenderness: There is abdominal tenderness (mild RUQ). There is no guarding or rebound.   Musculoskeletal:         General: Normal range of motion.      Cervical back: Normal range of motion and neck supple.      Right lower leg: No edema.      Left lower leg: No edema.   Skin:     General: Skin is warm and dry.      Findings: No rash.   Neurological:      General: No focal deficit present.      Mental Status: She is alert and oriented to person, place, and time. Mental status is at baseline.   Psychiatric:         Mood and Affect: Mood is anxious.         Speech: Speech is not delayed.         Behavior: Behavior is not agitated or aggressive.         Thought Content: Thought content does not include suicidal ideation.       Significant Labs: All pertinent labs within the past 24 hours have been reviewed.    Significant Imaging: I have reviewed all pertinent imaging results/findings within the past 24 hours.      Assessment/Plan:      * Alcohol withdrawal  Patient presented with 1 week of N/V/D.  Unclear if this is related to withdrawal at this time, but CIWA-AR is elevated and evidence of ketoacidosis on admission labs.  No abdominal pain.  Last drink was day PTA.  LFTs with T bili of 0.8, Alk phos of 63, AST/ALT 42/41.  Given Ativan 2mg in ED, in addition to IV fluids and anti-emetics.  Received 3 dose of IV Valium yesterday.  CIWA this morning was 12 and given another dose.    Plan:  Continue with CIWA-Ar q 4 with IV Valium 10mg prn CIWA >8  Continue with IV fluids with LR at 100ml/hr  CLD and ADAT  Anti-emetics prn  Thiamine/Folic Acid/MVI daily  Seizure and Fall precautions  SW  consult        Gram-negative bacteremia  Fever overnight on 4/29/2022 and again yesterday.  WBC normal, Procal and Lactic Acid normal.  Blood cultures on 4/29/2022 with 2/4 bottles of GNR - source likely urinary given Urine Culture growing GNR as well (E coli).  No respiratory symptoms.    -Follow up Blood Culture results - repeat set sent on 4/30/2022  -Follow up Stool Studies as below  -Follow u p CXR and CT A/P - both ordered on 4/29/2022 and not done  -Monitor off antibiotics for now      Alcoholic ketoacidosis  AG 22 with beta-hb elevated at 4.4.  Glucose 69.  -Continue with IV fluids - consider change to D5 1/2 NS if needed  -Monitor closely for improvement      Diarrhea  Patient presented with 1 week of N/V/D.  No complaints of abdominal pain.  Has decreased appetite.  Still with nausea and last episode of diarrhea was day PTA.  Mostly described as loose to soft.  No watery BMs. Labs on admission with normal Lipase.  LFTs on admission with mild bump in AST at 42.  No imaging done in ED.  She was evaluated by GI on 4/18/2022 for Loose stools and bloating with Fecal incontinence - TTG, IgA normal at that time.  Pancreatic elastase pending.  She was recommended to start Fibercon 2 tabs daily.   -Follow up Stool C Diff, WBC, Culture  -Consider imaging  -Treat withdrawal as above  -Start Pancreatic enzymes      COPD (chronic obstructive pulmonary disease)  Uses Home Oxygen at night (2-3L).  Quit smoking in 2/2022 per Pulmonary Note.  Mild VINICIO - did not need CPAP.  History of Sarcoidosis - not on active treatment.  Home Meds:  Atrovent HFA, Stiolto Respimat  Last seen by Dr. Patel with Pulmonary in 12/2020.  No Tachypnea on admission.  Pox stable on 2L O2NC  Lungs clear.     Plan:  Continue Duonebs q6 prn  Continue O2 NC 2L  CXR as above  Follow closely      Obesity (BMI 30.0-34.9)  BMI 33.27 - patient would benefit for weight reduction      Type 2 diabetes mellitus with hyperglycemia  Home Meds:  Metformin XR 1g  bid, Ozempic q7 days  A1C 6.6 in 3/2022.  -Hold Home Meds  -POCT Glucose qAC and HS  -Low-correction dose SSI prn  -Diabetic Diet      Hypothyroidism  TSH normal on admission  -Continue Levothyroxine      Anemia  Hgb 12.1 on admission and dropped to 10.3 after IV fluids  -Follow      Malignant neoplasm of central portion of right breast in female, estrogen receptor positive  Mammogram on September 4, 2020 showed a focal asymmetry in the retroareolar region of the right breast.  Ultrasound at that time showed a 9 x 5 x 8 mm hypoechoic mass at 12:00 p.m.  Biopsy on September 29, 2020 showed grade 2 infiltrating ductal carcinoma (histologic grade 3, nuclear grade 2, mitotic index 2).  Tumor was 95% ER positive 20-30% MN positive and HER2 was 2+ but negative by FISH.  Ki-67 was 80%.  On October 29, 2020 bilateral mastectomy and right axillary sentinel lymph node biopsy was performed.  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  Margins were negative with closest margin 6 mm.  The sentinel lymph node was negative. Final pathological staging T1b N0 stage IA.  -Continue Arimidex      Hyperlipidemia  -Continue Statin      Depression with anxiety  Home Meds:  Abilify 5mg, Cymbalta 60mg, Trazodone 300mg qHS  -Continue with home meds    Essential hypertension  Home Meds:  Lisinopril 20mg once a day, HCTZ 25mg, and Metoprolol XL 50mg  -Continue Lisinopril and Metoprolol for now  -Monitor and adjust as needed        VTE Risk Mitigation (From admission, onward)         Ordered     Place sequential compression device  Until discontinued         04/29/22 1644     heparin (porcine) injection 5,000 Units  Every 8 hours         04/29/22 1039     IP VTE HIGH RISK PATIENT  Once         04/29/22 1039     Place sequential compression device  Until discontinued         04/29/22 1039                Discharge Planning   BECCA:      Code Status: Full Code   Is the patient medically ready for discharge?:     Reason for  patient still in hospital (select all that apply): Patient trending condition, Treatment and Consult recommendations                     Stewart Hayden MD  Department of Hospital Medicine   [facility] Med Surg ([facility])

## 2022-05-02 ENCOUNTER — PATIENT MESSAGE (OUTPATIENT)
Dept: INTERNAL MEDICINE | Facility: CLINIC | Age: 62
End: 2022-05-02
Payer: COMMERCIAL

## 2022-05-02 LAB
ALBUMIN SERPL BCP-MCNC: 2.9 G/DL (ref 3.5–5.2)
ALP SERPL-CCNC: 52 U/L (ref 55–135)
ALT SERPL W/O P-5'-P-CCNC: 36 U/L (ref 10–44)
ANION GAP SERPL CALC-SCNC: 13 MMOL/L (ref 8–16)
AST SERPL-CCNC: 39 U/L (ref 10–40)
BACTERIA BLD CULT: ABNORMAL
BACTERIA UR CULT: ABNORMAL
BASOPHILS # BLD AUTO: 0.01 K/UL (ref 0–0.2)
BASOPHILS NFR BLD: 0.2 % (ref 0–1.9)
BILIRUB SERPL-MCNC: 0.5 MG/DL (ref 0.1–1)
BUN SERPL-MCNC: 8 MG/DL (ref 8–23)
CALCIUM SERPL-MCNC: 8.6 MG/DL (ref 8.7–10.5)
CHLORIDE SERPL-SCNC: 103 MMOL/L (ref 95–110)
CO2 SERPL-SCNC: 19 MMOL/L (ref 23–29)
CREAT SERPL-MCNC: 0.8 MG/DL (ref 0.5–1.4)
DIFFERENTIAL METHOD: ABNORMAL
E COLI SXT1 STL QL IA: NEGATIVE
E COLI SXT2 STL QL IA: NEGATIVE
EOSINOPHIL # BLD AUTO: 0 K/UL (ref 0–0.5)
EOSINOPHIL NFR BLD: 0.4 % (ref 0–8)
ERYTHROCYTE [DISTWIDTH] IN BLOOD BY AUTOMATED COUNT: 18.5 % (ref 11.5–14.5)
EST. GFR  (AFRICAN AMERICAN): >60 ML/MIN/1.73 M^2
EST. GFR  (NON AFRICAN AMERICAN): >60 ML/MIN/1.73 M^2
GLUCOSE SERPL-MCNC: 105 MG/DL (ref 70–110)
HCT VFR BLD AUTO: 34.2 % (ref 37–48.5)
HGB BLD-MCNC: 10.6 G/DL (ref 12–16)
IMM GRANULOCYTES # BLD AUTO: 0.02 K/UL (ref 0–0.04)
IMM GRANULOCYTES NFR BLD AUTO: 0.4 % (ref 0–0.5)
LYMPHOCYTES # BLD AUTO: 1.5 K/UL (ref 1–4.8)
LYMPHOCYTES NFR BLD: 29.5 % (ref 18–48)
MCH RBC QN AUTO: 28.5 PG (ref 27–31)
MCHC RBC AUTO-ENTMCNC: 31 G/DL (ref 32–36)
MCV RBC AUTO: 92 FL (ref 82–98)
MONOCYTES # BLD AUTO: 0.7 K/UL (ref 0.3–1)
MONOCYTES NFR BLD: 13.7 % (ref 4–15)
NEUTROPHILS # BLD AUTO: 2.9 K/UL (ref 1.8–7.7)
NEUTROPHILS NFR BLD: 55.8 % (ref 38–73)
NRBC BLD-RTO: 0 /100 WBC
PLATELET # BLD AUTO: 82 K/UL (ref 150–450)
PMV BLD AUTO: 9.5 FL (ref 9.2–12.9)
POCT GLUCOSE: 130 MG/DL (ref 70–110)
POCT GLUCOSE: 134 MG/DL (ref 70–110)
POCT GLUCOSE: 148 MG/DL (ref 70–110)
POCT GLUCOSE: 153 MG/DL (ref 70–110)
POTASSIUM SERPL-SCNC: 4.2 MMOL/L (ref 3.5–5.1)
PROT SERPL-MCNC: 6.3 G/DL (ref 6–8.4)
RBC # BLD AUTO: 3.72 M/UL (ref 4–5.4)
SODIUM SERPL-SCNC: 135 MMOL/L (ref 136–145)
WBC # BLD AUTO: 5.12 K/UL (ref 3.9–12.7)

## 2022-05-02 PROCEDURE — 99233 SBSQ HOSP IP/OBS HIGH 50: CPT | Mod: ,,, | Performed by: INTERNAL MEDICINE

## 2022-05-02 PROCEDURE — 87040 BLOOD CULTURE FOR BACTERIA: CPT | Performed by: INTERNAL MEDICINE

## 2022-05-02 PROCEDURE — 63600175 PHARM REV CODE 636 W HCPCS: Performed by: EMERGENCY MEDICINE

## 2022-05-02 PROCEDURE — C1751 CATH, INF, PER/CENT/MIDLINE: HCPCS

## 2022-05-02 PROCEDURE — 85025 COMPLETE CBC W/AUTO DIFF WBC: CPT | Performed by: INTERNAL MEDICINE

## 2022-05-02 PROCEDURE — S4991 NICOTINE PATCH NONLEGEND: HCPCS | Performed by: PHYSICIAN ASSISTANT

## 2022-05-02 PROCEDURE — 99233 PR SUBSEQUENT HOSPITAL CARE,LEVL III: ICD-10-PCS | Mod: ,,, | Performed by: INTERNAL MEDICINE

## 2022-05-02 PROCEDURE — 63600175 PHARM REV CODE 636 W HCPCS: Performed by: INTERNAL MEDICINE

## 2022-05-02 PROCEDURE — 27000207 HC ISOLATION

## 2022-05-02 PROCEDURE — 25000242 PHARM REV CODE 250 ALT 637 W/ HCPCS: Performed by: INTERNAL MEDICINE

## 2022-05-02 PROCEDURE — 87449 NOS EACH ORGANISM AG IA: CPT | Performed by: INTERNAL MEDICINE

## 2022-05-02 PROCEDURE — 36415 COLL VENOUS BLD VENIPUNCTURE: CPT | Performed by: INTERNAL MEDICINE

## 2022-05-02 PROCEDURE — 25000003 PHARM REV CODE 250: Performed by: PHYSICIAN ASSISTANT

## 2022-05-02 PROCEDURE — 25000003 PHARM REV CODE 250: Performed by: INTERNAL MEDICINE

## 2022-05-02 PROCEDURE — 21400001 HC TELEMETRY ROOM

## 2022-05-02 PROCEDURE — 80053 COMPREHEN METABOLIC PANEL: CPT | Performed by: INTERNAL MEDICINE

## 2022-05-02 RX ORDER — LANOLIN ALCOHOL/MO/W.PET/CERES
1 CREAM (GRAM) TOPICAL DAILY
Status: DISCONTINUED | OUTPATIENT
Start: 2022-05-02 | End: 2022-05-04 | Stop reason: HOSPADM

## 2022-05-02 RX ORDER — BENZONATATE 100 MG/1
100 CAPSULE ORAL 3 TIMES DAILY PRN
Status: DISCONTINUED | OUTPATIENT
Start: 2022-05-02 | End: 2022-05-04 | Stop reason: HOSPADM

## 2022-05-02 RX ORDER — GABAPENTIN 300 MG/1
600 CAPSULE ORAL 3 TIMES DAILY
Status: DISCONTINUED | OUTPATIENT
Start: 2022-05-02 | End: 2022-05-03

## 2022-05-02 RX ADMIN — DICYCLOMINE HYDROCHLORIDE 10 MG: 10 CAPSULE ORAL at 05:05

## 2022-05-02 RX ADMIN — DULOXETINE 60 MG: 30 CAPSULE, DELAYED RELEASE ORAL at 09:05

## 2022-05-02 RX ADMIN — LEVOTHYROXINE SODIUM 50 MCG: 50 TABLET ORAL at 06:05

## 2022-05-02 RX ADMIN — PANCRELIPASE 1 CAPSULE: 60000; 12000; 38000 CAPSULE, DELAYED RELEASE PELLETS ORAL at 05:05

## 2022-05-02 RX ADMIN — ONDANSETRON 4 MG: 2 INJECTION INTRAMUSCULAR; INTRAVENOUS at 11:05

## 2022-05-02 RX ADMIN — GABAPENTIN 600 MG: 300 CAPSULE ORAL at 09:05

## 2022-05-02 RX ADMIN — METHOCARBAMOL TABLETS 750 MG: 750 TABLET, COATED ORAL at 05:05

## 2022-05-02 RX ADMIN — HEPARIN SODIUM 5000 UNITS: 5000 INJECTION INTRAVENOUS; SUBCUTANEOUS at 03:05

## 2022-05-02 RX ADMIN — OXYCODONE 5 MG: 5 TABLET ORAL at 05:05

## 2022-05-02 RX ADMIN — TRAZODONE HYDROCHLORIDE 300 MG: 100 TABLET ORAL at 08:05

## 2022-05-02 RX ADMIN — HEPARIN SODIUM 5000 UNITS: 5000 INJECTION INTRAVENOUS; SUBCUTANEOUS at 09:05

## 2022-05-02 RX ADMIN — TIOTROPIUM BROMIDE AND OLODATEROL 1 PUFF: 3.124; 2.736 SPRAY, METERED RESPIRATORY (INHALATION) at 08:05

## 2022-05-02 RX ADMIN — SODIUM CHLORIDE, SODIUM LACTATE, POTASSIUM CHLORIDE, AND CALCIUM CHLORIDE: .6; .31; .03; .02 INJECTION, SOLUTION INTRAVENOUS at 05:05

## 2022-05-02 RX ADMIN — METHOCARBAMOL TABLETS 750 MG: 750 TABLET, COATED ORAL at 09:05

## 2022-05-02 RX ADMIN — PANCRELIPASE 1 CAPSULE: 60000; 12000; 38000 CAPSULE, DELAYED RELEASE PELLETS ORAL at 11:05

## 2022-05-02 RX ADMIN — GABAPENTIN 600 MG: 300 CAPSULE ORAL at 08:05

## 2022-05-02 RX ADMIN — ROPINIROLE HYDROCHLORIDE 0.25 MG: 0.25 TABLET, FILM COATED ORAL at 08:05

## 2022-05-02 RX ADMIN — FOLIC ACID 1 MG: 1 TABLET ORAL at 09:05

## 2022-05-02 RX ADMIN — LISINOPRIL 20 MG: 20 TABLET ORAL at 09:05

## 2022-05-02 RX ADMIN — CEFTRIAXONE 1 G: 1 INJECTION, SOLUTION INTRAVENOUS at 03:05

## 2022-05-02 RX ADMIN — THIAMINE HCL TAB 100 MG 100 MG: 100 TAB at 09:05

## 2022-05-02 RX ADMIN — ONDANSETRON 4 MG: 2 INJECTION INTRAMUSCULAR; INTRAVENOUS at 03:05

## 2022-05-02 RX ADMIN — THERA TABS 1 TABLET: TAB at 09:05

## 2022-05-02 RX ADMIN — Medication 1 PATCH: at 09:05

## 2022-05-02 RX ADMIN — OXYCODONE 5 MG: 5 TABLET ORAL at 11:05

## 2022-05-02 RX ADMIN — PIPERACILLIN AND TAZOBACTAM 4.5 G: 4; .5 INJECTION, POWDER, LYOPHILIZED, FOR SOLUTION INTRAVENOUS; PARENTERAL at 06:05

## 2022-05-02 RX ADMIN — DICYCLOMINE HYDROCHLORIDE 10 MG: 10 CAPSULE ORAL at 09:05

## 2022-05-02 RX ADMIN — GABAPENTIN 600 MG: 300 CAPSULE ORAL at 03:05

## 2022-05-02 RX ADMIN — DICYCLOMINE HYDROCHLORIDE 10 MG: 10 CAPSULE ORAL at 08:05

## 2022-05-02 RX ADMIN — ARIPIPRAZOLE 5 MG: 5 TABLET ORAL at 09:05

## 2022-05-02 RX ADMIN — METOPROLOL SUCCINATE 50 MG: 50 TABLET, EXTENDED RELEASE ORAL at 09:05

## 2022-05-02 RX ADMIN — FERROUS SULFATE TAB 325 MG (65 MG ELEMENTAL FE) 1 EACH: 325 (65 FE) TAB at 03:05

## 2022-05-02 RX ADMIN — ATORVASTATIN CALCIUM 10 MG: 10 TABLET, FILM COATED ORAL at 09:05

## 2022-05-02 RX ADMIN — ANASTROZOLE 1 MG: 1 TABLET, COATED ORAL at 09:05

## 2022-05-02 RX ADMIN — PANTOPRAZOLE SODIUM 40 MG: 40 TABLET, DELAYED RELEASE ORAL at 09:05

## 2022-05-02 RX ADMIN — PANCRELIPASE 1 CAPSULE: 60000; 12000; 38000 CAPSULE, DELAYED RELEASE PELLETS ORAL at 09:05

## 2022-05-02 RX ADMIN — DIAZEPAM 10 MG: 5 INJECTION, SOLUTION INTRAMUSCULAR; INTRAVENOUS at 05:05

## 2022-05-02 RX ADMIN — DICYCLOMINE HYDROCHLORIDE 10 MG: 10 CAPSULE ORAL at 03:05

## 2022-05-02 RX ADMIN — HEPARIN SODIUM 5000 UNITS: 5000 INJECTION INTRAVENOUS; SUBCUTANEOUS at 06:05

## 2022-05-02 RX ADMIN — METHOCARBAMOL TABLETS 750 MG: 750 TABLET, COATED ORAL at 08:05

## 2022-05-02 NOTE — PLAN OF CARE
Patient A&Ox4. CIWA q4. Patient  in room. PRN tylenol given tolerated well. Call light within reach. Side railsX2.       Problem: Adult Inpatient Plan of Care  Goal: Plan of Care Review  Outcome: Ongoing, Progressing  Goal: Patient-Specific Goal (Individualized)  Outcome: Ongoing, Progressing     Problem: Diabetes Comorbidity  Goal: Blood Glucose Level Within Targeted Range  Outcome: Ongoing, Progressing     Problem: Infection  Goal: Absence of Infection Signs and Symptoms  Outcome: Ongoing, Progressing     Problem: Skin Injury Risk Increased  Goal: Skin Health and Integrity  Outcome: Ongoing, Progressing

## 2022-05-02 NOTE — ASSESSMENT & PLAN NOTE
Fever overnight on 4/29/2022 and again yesterday.  Labs on admission with WBC normal, Procal and Lactic Acid normal.  Blood cultures on 4/29/2022 with 2/4 bottles of E coli - source likely urinary given Urine Culture growing GNR as well (E coli).  Repeat blood culture on 4/30/2022 also positive.  Repeat blood culture on 5/1/2022 with NGTD. CT A/p with contrast showed right perinephric/periureteral stranding with mild ureteral wall thickening and abnormal enhancement of the right kidney in keeping with pyelonephritis.  Started on IV Zosyn on 4/30/2022.  -De-escalate antibiotics to IV Ceftriaxone today and treat for total of 14 days.  -Follow up Blood Culture results

## 2022-05-02 NOTE — PLAN OF CARE
Problem: Adult Inpatient Plan of Care  Goal: Plan of Care Review  5/2/2022 0528 by Didi Broussard RN  Outcome: Ongoing, Progressing  5/1/2022 1836 by Didi Broussard RN  Outcome: Ongoing, Progressing  Goal: Absence of Hospital-Acquired Illness or Injury  Outcome: Ongoing, Progressing  Goal: Optimal Comfort and Wellbeing  5/2/2022 0528 by Didi Broussard RN  Outcome: Ongoing, Progressing  5/1/2022 1836 by Didi Broussard RN  Outcome: Ongoing, Progressing     Problem: Diabetes Comorbidity  Goal: Blood Glucose Level Within Targeted Range  5/2/2022 0528 by Didi Broussard RN  Outcome: Ongoing, Progressing  5/1/2022 1836 by Didi Broussard RN  Outcome: Ongoing, Progressing     Problem: Infection  Goal: Absence of Infection Signs and Symptoms  5/2/2022 0528 by Didi Broussard RN  Outcome: Ongoing, Progressing  5/1/2022 1836 by Didi Broussard RN  Outcome: Ongoing, Progressing     Problem: Skin Injury Risk Increased  Goal: Skin Health and Integrity  5/2/2022 0528 by Didi Broussard RN  Outcome: Ongoing, Progressing  5/1/2022 1836 by Didi Broussard RN  Outcome: Ongoing, Progressing

## 2022-05-02 NOTE — ASSESSMENT & PLAN NOTE
Patient presented with 1 week of N/V/D.  Unclear if this is related to withdrawal at this time, but CIWA-AR is elevated and evidence of ketoacidosis on admission labs.  No abdominal pain.  Last drink was day PTA.  LFTs with T bili of 0.8, Alk phos of 63, AST/ALT 42/41.  Given Ativan 2mg in ED, in addition to IV fluids and anti-emetics.  Received 2 dose of IV Valium yesterday.  CIWA this morning was <8 this morning.    Plan:  Continue with CIWA-Ar q 4 with IV Valium 10mg prn CIWA >8  Continue with IV fluids with LR at 100ml/hr  Advance diet to FLD and ADAT  Pain control and Anti-emetics prn  Thiamine/Folic Acid/MVI daily  Seizure and Fall precautions  SW consult  PT consult

## 2022-05-02 NOTE — PROGRESS NOTES
05/02/22 1704   Pain/Comfort/Sleep   POSS (Pasero Opioid-Induced Sed Scale) 1 - Awake and alert   Pain Reassessment   Pain Rating Prior to Med Admin 6        Altered Skin Integrity 04/29/22 1644 Buttocks Moisture associated dermatitis   Date First Assessed/Time First Assessed: 04/29/22 1644   Altered Skin Integrity Present on Admission: yes  Location: Buttocks  Is this injury device related?: No  Primary Wound Type: Moisture associated dermatitis   Wound Image    Dressing Appearance Open to air   Drainage Amount None   Drainage Characteristics/Odor No odor   Appearance Pink;Red   Care Cleansed with:;Soap and water;Applied:;Skin Barrier   Dressing Change Due 05/03/22

## 2022-05-02 NOTE — SUBJECTIVE & OBJECTIVE
Interval History: Patient is awake and alert this morning.  Had a fever again overnight, but afebrile this morning.  Still with nausea, but improved.  Some abdominal soreness.  CIWA this morning was < 8.   at bedside.    Review of Systems   Constitutional:  Positive for appetite change (decreased) and fever. Negative for activity change, chills and fatigue.   HENT:  Negative for congestion and ear pain.    Eyes:  Negative for pain.   Respiratory:  Negative for cough, chest tightness and wheezing.    Cardiovascular:  Negative for chest pain, palpitations and leg swelling.   Gastrointestinal:  Positive for abdominal distention (soreness) and nausea (improved). Negative for abdominal pain, diarrhea, rectal pain and vomiting.   Endocrine: Negative for polydipsia, polyphagia and polyuria.   Genitourinary:  Negative for difficulty urinating, dysuria and pelvic pain.   Musculoskeletal:  Negative for neck pain.   Skin:  Negative for rash.   Neurological:  Negative for dizziness, tremors and headaches.   Hematological:  Does not bruise/bleed easily.   Psychiatric/Behavioral:  Positive for dysphoric mood and sleep disturbance. Negative for agitation, behavioral problems, confusion, decreased concentration and suicidal ideas.    Objective:     Vital Signs (Most Recent):  Temp: 99.2 °F (37.3 °C) (05/02/22 1155)  Pulse: 79 (05/02/22 1155)  Resp: 18 (05/02/22 1155)  BP: (!) 143/62 (05/02/22 1155)  SpO2: (!) 92 % (05/02/22 1155)   Vital Signs (24h Range):  Temp:  [97.9 °F (36.6 °C)-101.4 °F (38.6 °C)] 99.2 °F (37.3 °C)  Pulse:  [66-96] 79  Resp:  [16-20] 18  SpO2:  [90 %-100 %] 92 %  BP: (125-173)/(61-79) 143/62     Weight: 96 kg (211 lb 10.3 oz)  Body mass index is 34.16 kg/m².    Intake/Output Summary (Last 24 hours) at 5/2/2022 1159  Last data filed at 5/2/2022 0636  Gross per 24 hour   Intake 1850.78 ml   Output 850 ml   Net 1000.78 ml        Physical Exam  Constitutional:       General: She is not in acute distress.      Appearance: She is obese. She is ill-appearing (chronic). She is not toxic-appearing or diaphoretic.   HENT:      Head: Normocephalic and atraumatic.      Right Ear: External ear normal.      Left Ear: External ear normal.      Nose: Nose normal.      Mouth/Throat:      Mouth: Mucous membranes are moist.   Eyes:      General: No scleral icterus.     Pupils: Pupils are equal, round, and reactive to light.   Cardiovascular:      Rate and Rhythm: Normal rate and regular rhythm.      Heart sounds: No murmur heard.  Pulmonary:      Effort: Pulmonary effort is normal. No respiratory distress.      Breath sounds: Normal breath sounds. No wheezing or rales.   Abdominal:      General: Bowel sounds are normal. There is distension.      Palpations: There is no mass.      Tenderness: There is abdominal tenderness (mild RUQ). There is no guarding or rebound.   Musculoskeletal:         General: Normal range of motion.      Cervical back: Normal range of motion and neck supple.      Right lower leg: No edema.      Left lower leg: No edema.   Skin:     General: Skin is warm and dry.      Findings: No rash.   Neurological:      General: No focal deficit present.      Mental Status: She is alert and oriented to person, place, and time. Mental status is at baseline.   Psychiatric:         Mood and Affect: Mood is anxious.         Speech: Speech is not delayed.         Behavior: Behavior is not agitated or aggressive.         Thought Content: Thought content does not include suicidal ideation.       Significant Labs: All pertinent labs within the past 24 hours have been reviewed.    Significant Imaging: I have reviewed all pertinent imaging results/findings within the past 24 hours.

## 2022-05-02 NOTE — PROGRESS NOTES
05/01/22 2325   Darnell Risk Assessment   Sensory Perception 3-->slightly limited   Moisture 3-->occasionally moist   Activity 3-->walks occasionally   Mobility 3-->slightly limited   Nutrition 3-->adequate   Friction and Shear 3-->no apparent problem   Darnell Score 18   [facility] ([facility])  Wound Care  Progress Note    Patient Name: Earl Abdul  MRN: [id number]  Admission Date: 4/29/2022  Hospital Length of Stay: 3 days  Attending Physician: Stewart Hayden MD  Primary Care Provider: Andrew Rodriguez MD   No new subjective & objective note has been filed under this hospital service since the last note was generated.    Assessment/Plan:         HAPI prevention , Darnell <18 PIP orders placed .    Sonya Clark LPN  Wound Care  [facility] ([facility])

## 2022-05-02 NOTE — HOSPITAL COURSE
Patient is 58 year woman with history of hypertension, dyslipidemia, depression, alcohol abuse with recent relapse admitted for treatment of alcoholic ketoacidosis and alcohol withdrawal and also acute pyelonephritis with Escherichia coli bacteremia.  Patient treated intravenous antibiotics.  Metabolic derangements and alcohol withdrawal treated with supportive care and as needed benzodiazepines.  Patient resolution of metabolic derangement secondary to alcohol abuse.  Evidence of pyelonephritis improved on intravenous antibiotics.  In light of repeated positive blood cultures for Escherichia coli, Infectious Disease service was consulted for evaluation and for treatment recommendations.  Most recent blood culture collected on 5/2/2022 negative to date.  Dr. Scottie Pond recommended transition patient to oral ciprofloxacin to complete an extended course of antibiotic treatment.  Patient counseled importance of seeing further alcohol use.  Close outpatient follow-up for ongoing management of her multiple medical comorbidities advised.

## 2022-05-02 NOTE — NURSING
Pt diet advance to clear liquids. Tolerating well. Two additional vape pens found in pt room. Two additional vape pens found in pt room. Will send to security. New order for nicotine patch daily. New order for tessalon 100mg TID PRN for cough.

## 2022-05-02 NOTE — PROGRESS NOTES
[facility] Medicine  Progress Note    Patient Name: Earl Abdul  MRN: [id number]  Patient Class: IP- Inpatient   Admission Date: 4/29/2022  Length of Stay: 3 days  Attending Physician: Stewart Hayden MD  Primary Care Provider: Andrew Rodriguez MD        Subjective:     Principal Problem:Alcohol withdrawal      HPI:  Patient is a 61 year old female with a PMH significant for HTN, HLD, Depression (Suicide Attempts in past), Alcohol Abuse (drinks a half of a fifth of Whiskey per day) complicated with withdrawal seizures in past and pancreatitis , COPD (prn and qHS Oxygen of 2-3L),  HTN, HLD, Sarcoidosis of Lung (not active), Hepatic Steatosis, Hypothyroidism, PUD/Gastritis, Breast Cancer s/p double mastectomy and breast reconstruction who presented with N/V/D without abdominal pain for past week.  Patient states she started Restoril a week ago for insomnia, and since has developed N/V/D that she thought was due to the Restoril or withdrawing from Trazodone.  She restarted her Trazodone, but continued with these symptoms.  She denies abdominal pain.  No chest pain or SOB.  Patient last had a drink of alcohol yesterday, but was unable to keep most of it down.  No significant headache or change in vision.  No F/c.  No dysuria.  She was given IV NS x 1L in ED, as well as Zofran, Banana Bag, IV Thiamine, and Ativan 2MG IV.  CIWA-Ar at the time of my interview was 10.  Patient is being admitted for Alcohol Withdrawal and Ketoacidosis.      Overview/Hospital Course:  No notes on file    Interval History: Patient is awake and alert this morning.  Had a fever again overnight, but afebrile this morning.  Still with nausea, but improved.  Some abdominal soreness.  CIWA this morning was < 8.   at bedside.    Review of Systems   Constitutional:  Positive for appetite change (decreased) and fever. Negative for activity change, chills and fatigue.   HENT:  Negative for congestion and  ear pain.    Eyes:  Negative for pain.   Respiratory:  Negative for cough, chest tightness and wheezing.    Cardiovascular:  Negative for chest pain, palpitations and leg swelling.   Gastrointestinal:  Positive for abdominal distention (soreness) and nausea (improved). Negative for abdominal pain, diarrhea, rectal pain and vomiting.   Endocrine: Negative for polydipsia, polyphagia and polyuria.   Genitourinary:  Negative for difficulty urinating, dysuria and pelvic pain.   Musculoskeletal:  Negative for neck pain.   Skin:  Negative for rash.   Neurological:  Negative for dizziness, tremors and headaches.   Hematological:  Does not bruise/bleed easily.   Psychiatric/Behavioral:  Positive for dysphoric mood and sleep disturbance. Negative for agitation, behavioral problems, confusion, decreased concentration and suicidal ideas.    Objective:     Vital Signs (Most Recent):  Temp: 99.2 °F (37.3 °C) (05/02/22 1155)  Pulse: 79 (05/02/22 1155)  Resp: 18 (05/02/22 1155)  BP: (!) 143/62 (05/02/22 1155)  SpO2: (!) 92 % (05/02/22 1155)   Vital Signs (24h Range):  Temp:  [97.9 °F (36.6 °C)-101.4 °F (38.6 °C)] 99.2 °F (37.3 °C)  Pulse:  [66-96] 79  Resp:  [16-20] 18  SpO2:  [90 %-100 %] 92 %  BP: (125-173)/(61-79) 143/62     Weight: 96 kg (211 lb 10.3 oz)  Body mass index is 34.16 kg/m².    Intake/Output Summary (Last 24 hours) at 5/2/2022 1159  Last data filed at 5/2/2022 0636  Gross per 24 hour   Intake 1850.78 ml   Output 850 ml   Net 1000.78 ml        Physical Exam  Constitutional:       General: She is not in acute distress.     Appearance: She is obese. She is ill-appearing (chronic). She is not toxic-appearing or diaphoretic.   HENT:      Head: Normocephalic and atraumatic.      Right Ear: External ear normal.      Left Ear: External ear normal.      Nose: Nose normal.      Mouth/Throat:      Mouth: Mucous membranes are moist.   Eyes:      General: No scleral icterus.     Pupils: Pupils are equal, round, and reactive to  light.   Cardiovascular:      Rate and Rhythm: Normal rate and regular rhythm.      Heart sounds: No murmur heard.  Pulmonary:      Effort: Pulmonary effort is normal. No respiratory distress.      Breath sounds: Normal breath sounds. No wheezing or rales.   Abdominal:      General: Bowel sounds are normal. There is distension.      Palpations: There is no mass.      Tenderness: There is abdominal tenderness (mild RUQ). There is no guarding or rebound.   Musculoskeletal:         General: Normal range of motion.      Cervical back: Normal range of motion and neck supple.      Right lower leg: No edema.      Left lower leg: No edema.   Skin:     General: Skin is warm and dry.      Findings: No rash.   Neurological:      General: No focal deficit present.      Mental Status: She is alert and oriented to person, place, and time. Mental status is at baseline.   Psychiatric:         Mood and Affect: Mood is anxious.         Speech: Speech is not delayed.         Behavior: Behavior is not agitated or aggressive.         Thought Content: Thought content does not include suicidal ideation.       Significant Labs: All pertinent labs within the past 24 hours have been reviewed.    Significant Imaging: I have reviewed all pertinent imaging results/findings within the past 24 hours.      Assessment/Plan:      * Alcohol withdrawal  Patient presented with 1 week of N/V/D.  Unclear if this is related to withdrawal at this time, but CIWA-AR is elevated and evidence of ketoacidosis on admission labs.  No abdominal pain.  Last drink was day PTA.  LFTs with T bili of 0.8, Alk phos of 63, AST/ALT 42/41.  Given Ativan 2mg in ED, in addition to IV fluids and anti-emetics.  Received 2 dose of IV Valium yesterday.  CIWA this morning was <8 this morning.    Plan:  Continue with CIWA-Ar q 4 with IV Valium 10mg prn CIWA >8  Continue with IV fluids with LR at 100ml/hr  Advance diet to FLD and ADAT  Pain control and Anti-emetics  prn  Thiamine/Folic Acid/MVI daily  Seizure and Fall precautions  SW consult  PT consult      Pyelonephritis due to Escherichia coli  Fever overnight on 4/29/2022 and again yesterday.  Labs on admission with WBC normal, Procal and Lactic Acid normal.  Blood cultures on 4/29/2022 with 2/4 bottles of E coli - source likely urinary given Urine Culture growing GNR as well (E coli).  Repeat blood culture on 4/30/2022 also positive.  Repeat blood culture on 5/1/2022 with NGTD. CT A/p with contrast showed right perinephric/periureteral stranding with mild ureteral wall thickening and abnormal enhancement of the right kidney in keeping with pyelonephritis.  Started on IV Zosyn on 4/30/2022.  -De-escalate antibiotics to IV Ceftriaxone today and treat for total of 14 days.  -Follow up Blood Culture results        Alcoholic ketoacidosis  AG 22 with beta-hb elevated at 4.4.  Glucose 69.  Started on IV fluids and po diet.  AG closed and Glucose stable.  -Continue with IV fluids   -ADAT      Diarrhea  Patient presented with 1 week of N/V/D.  No complaints of abdominal pain.  Has decreased appetite.  Still with nausea and last episode of diarrhea was day PTA.  Mostly described as loose to soft, but sometimes watery. Labs on admission with normal Lipase.  LFTs on admission with mild bump in AST at 42.  No imaging done in ED.    She was evaluated by GI on 4/18/2022 for Loose stools and bloating with Fecal incontinence - TTG, IgA normal at that time.  Pancreatic elastase pending.  She was recommended to start Fibercon 2 tabs daily.   -Follow up Stool C Diff, WBC, Culture  -Continue with Pancreatic enzymes      COPD (chronic obstructive pulmonary disease)  Uses Home Oxygen at night (2-3L).  Quit smoking in 2/2022 per Pulmonary Note.  Mild VINICIO - did not need CPAP.  History of Sarcoidosis - not on active treatment.  Home Meds:  Atrovent HFA, Stiolto Respimat  Last seen by Dr. Patel with Pulmonary in 12/2020.  No Tachypnea on admission.   Pox stable on 2L O2NC  Lungs clear.     Plan:  Continue Duonebs q6 prn  Continue O2 NC 2L  CXR as above  Follow closely      Obesity (BMI 30.0-34.9)  BMI 33.27 - patient would benefit for weight reduction      Type 2 diabetes mellitus with hyperglycemia  Home Meds:  Metformin XR 1g bid, Ozempic q7 days  A1C 6.6 in 3/2022.  -Hold Home Meds  -POCT Glucose qAC and HS  -Low-correction dose SSI prn  -Diabetic Diet      Hypothyroidism  TSH normal on admission  -Continue Levothyroxine      Anemia  Hgb 12.1 on admission and dropped to 10.6 after IV fluids  Labs in 1/2022 with Ferritin 36 and iron sat of 18%.  B12 403.  -Start on FeSO4 325mg daily - no stool softener given diarrhea  -Check Folate level  -Follow CBC      Malignant neoplasm of central portion of right breast in female, estrogen receptor positive  Mammogram on September 4, 2020 showed a focal asymmetry in the retroareolar region of the right breast.  Ultrasound at that time showed a 9 x 5 x 8 mm hypoechoic mass at 12:00 p.m.  Biopsy on September 29, 2020 showed grade 2 infiltrating ductal carcinoma (histologic grade 3, nuclear grade 2, mitotic index 2).  Tumor was 95% ER positive 20-30% WI positive and HER2 was 2+ but negative by FISH.  Ki-67 was 80%.  On October 29, 2020 bilateral mastectomy and right axillary sentinel lymph node biopsy was performed.  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  Margins were negative with closest margin 6 mm.  The sentinel lymph node was negative. Final pathological staging T1b N0 stage IA.  -Continue Arimidex      Hyperlipidemia  -Continue Statin      Depression with anxiety  Home Meds:  Abilify 5mg, Cymbalta 60mg, Trazodone 300mg qHS  -Continue with home meds    Essential hypertension  Home Meds:  Lisinopril 20mg once a day, HCTZ 25mg, and Metoprolol XL 50mg  -Continue Lisinopril and Metoprolol for now  -Monitor and adjust as needed        VTE Risk Mitigation (From admission, onward)         Ordered      Place sequential compression device  Until discontinued         04/29/22 1644     heparin (porcine) injection 5,000 Units  Every 8 hours         04/29/22 1039     IP VTE HIGH RISK PATIENT  Once         04/29/22 1039     Place sequential compression device  Until discontinued         04/29/22 1039                Discharge Planning   BECCA:      Code Status: Full Code   Is the patient medically ready for discharge?:     Reason for patient still in hospital (select all that apply): Patient trending condition, Treatment and Consult recommendations  Discharge Plan A: Home                  Stewart Hayden MD  Department of Hospital Medicine   [facility]

## 2022-05-02 NOTE — ASSESSMENT & PLAN NOTE
Mammogram on September 4, 2020 showed a focal asymmetry in the retroareolar region of the right breast.  Ultrasound at that time showed a 9 x 5 x 8 mm hypoechoic mass at 12:00 p.m.  Biopsy on September 29, 2020 showed grade 2 infiltrating ductal carcinoma (histologic grade 3, nuclear grade 2, mitotic index 2).  Tumor was 95% ER positive 20-30% CA positive and HER2 was 2+ but negative by FISH.  Ki-67 was 80%.  On October 29, 2020 bilateral mastectomy and right axillary sentinel lymph node biopsy was performed.  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  Margins were negative with closest margin 6 mm.  The sentinel lymph node was negative. Final pathological staging T1b N0 stage IA.  -Continue Arimidex

## 2022-05-02 NOTE — ASSESSMENT & PLAN NOTE
Hgb 12.1 on admission and dropped to 10.6 after IV fluids  Labs in 1/2022 with Ferritin 36 and iron sat of 18%.  B12 403.  -Start on FeSO4 325mg daily - no stool softener given diarrhea  -Check Folate level  -Follow CBC

## 2022-05-02 NOTE — NURSING
Security given permission to search pt room and belonging per house supervisor. Vape refill and Vape  removed , labeled, and placed in pt valuables envelope.

## 2022-05-02 NOTE — NURSING
Pt having a coughing fit. Upon PCT entering the room pt observed with fourth vape in hand. Pt is also on 2L NC. Vape taken and given to security in patient belonging envelope. Charge and PETRA Harmon PA-C. Pt to begin nicotine patch regimen in the am.

## 2022-05-02 NOTE — ASSESSMENT & PLAN NOTE
Fever overnight on 4/29/2022 and again yesterday.  Labs on admission with WBC normal, Procal and Lactic Acid normal.  Blood cultures on 4/29/2022 with 2/4 bottles of E coli - source likely urinary given Urine Culture growing GNR as well (E coli).  Repeat blood culture on 4/30/2022 also positive.  Repeat blood culture on 5/1/2022 with NGTD. CT A/p with contrast showed right perinephric/periureteral stranding with mild ureteral wall thickening and abnormal enhancement of the right kidney in keeping with pyelonephritis.  Started on IV Zosyn on 4/30/2022.  -De-escalate antibiotics to IV Ceftriaxone today  -Follow up Blood Culture results  Consult ID for treatment recommendations.

## 2022-05-02 NOTE — ASSESSMENT & PLAN NOTE
Patient presented with 1 week of N/V/D.  No complaints of abdominal pain.  Has decreased appetite.  Still with nausea and last episode of diarrhea was day PTA.  Mostly described as loose to soft, but sometimes watery. Labs on admission with normal Lipase.  LFTs on admission with mild bump in AST at 42.  No imaging done in ED.    She was evaluated by GI on 4/18/2022 for Loose stools and bloating with Fecal incontinence - TTG, IgA normal at that time.  Pancreatic elastase pending.  She was recommended to start Fibercon 2 tabs daily.   -Follow up Stool C Diff, WBC, Culture  -Continue with Pancreatic enzymes

## 2022-05-02 NOTE — ASSESSMENT & PLAN NOTE
AG 22 with beta-hb elevated at 4.4.  Glucose 69.  Started on IV fluids and po diet.  AG closed and Glucose stable.  -Continue with IV fluids   -ADAT

## 2022-05-03 ENCOUNTER — PATIENT MESSAGE (OUTPATIENT)
Dept: RESEARCH | Facility: HOSPITAL | Age: 62
End: 2022-05-03
Payer: COMMERCIAL

## 2022-05-03 LAB
BACTERIA BLD CULT: ABNORMAL
BACTERIA STL CULT: NORMAL
C DIFF GDH STL QL: NEGATIVE
C DIFF GDH STL QL: NEGATIVE
C DIFF TOX A+B STL QL IA: NEGATIVE
C DIFF TOX A+B STL QL IA: NEGATIVE
FOLATE SERPL-MCNC: 17.8 NG/ML (ref 4–24)
POCT GLUCOSE: 125 MG/DL (ref 70–110)
POCT GLUCOSE: 144 MG/DL (ref 70–110)
POCT GLUCOSE: 164 MG/DL (ref 70–110)
POCT GLUCOSE: 97 MG/DL (ref 70–110)
WBC #/AREA STL HPF: NORMAL /[HPF]

## 2022-05-03 PROCEDURE — 93010 ELECTROCARDIOGRAM REPORT: CPT | Mod: ,,, | Performed by: INTERNAL MEDICINE

## 2022-05-03 PROCEDURE — 93010 EKG 12-LEAD: ICD-10-PCS | Mod: ,,, | Performed by: INTERNAL MEDICINE

## 2022-05-03 PROCEDURE — 97530 THERAPEUTIC ACTIVITIES: CPT

## 2022-05-03 PROCEDURE — C1751 CATH, INF, PER/CENT/MIDLINE: HCPCS

## 2022-05-03 PROCEDURE — 99233 SBSQ HOSP IP/OBS HIGH 50: CPT | Mod: ,,, | Performed by: HOSPITALIST

## 2022-05-03 PROCEDURE — 97162 PT EVAL MOD COMPLEX 30 MIN: CPT

## 2022-05-03 PROCEDURE — 63600175 PHARM REV CODE 636 W HCPCS: Performed by: EMERGENCY MEDICINE

## 2022-05-03 PROCEDURE — 36415 COLL VENOUS BLD VENIPUNCTURE: CPT | Performed by: INTERNAL MEDICINE

## 2022-05-03 PROCEDURE — 25000003 PHARM REV CODE 250: Performed by: HOSPITALIST

## 2022-05-03 PROCEDURE — 99233 PR SUBSEQUENT HOSPITAL CARE,LEVL III: ICD-10-PCS | Mod: ,,, | Performed by: HOSPITALIST

## 2022-05-03 PROCEDURE — 93005 ELECTROCARDIOGRAM TRACING: CPT

## 2022-05-03 PROCEDURE — 25000003 PHARM REV CODE 250: Performed by: INTERNAL MEDICINE

## 2022-05-03 PROCEDURE — 82746 ASSAY OF FOLIC ACID SERUM: CPT | Performed by: INTERNAL MEDICINE

## 2022-05-03 PROCEDURE — 63600175 PHARM REV CODE 636 W HCPCS: Performed by: INTERNAL MEDICINE

## 2022-05-03 PROCEDURE — 11000001 HC ACUTE MED/SURG PRIVATE ROOM

## 2022-05-03 RX ORDER — METHOCARBAMOL 750 MG/1
750 TABLET, FILM COATED ORAL 3 TIMES DAILY PRN
Status: DISCONTINUED | OUTPATIENT
Start: 2022-05-03 | End: 2022-05-04 | Stop reason: HOSPADM

## 2022-05-03 RX ORDER — GABAPENTIN 300 MG/1
300 CAPSULE ORAL 3 TIMES DAILY
Status: DISCONTINUED | OUTPATIENT
Start: 2022-05-03 | End: 2022-05-03

## 2022-05-03 RX ORDER — GABAPENTIN 300 MG/1
600 CAPSULE ORAL 3 TIMES DAILY
Status: DISCONTINUED | OUTPATIENT
Start: 2022-05-03 | End: 2022-05-04 | Stop reason: HOSPADM

## 2022-05-03 RX ADMIN — ONDANSETRON 4 MG: 2 INJECTION INTRAMUSCULAR; INTRAVENOUS at 05:05

## 2022-05-03 RX ADMIN — ATORVASTATIN CALCIUM 10 MG: 10 TABLET, FILM COATED ORAL at 08:05

## 2022-05-03 RX ADMIN — GABAPENTIN 600 MG: 300 CAPSULE ORAL at 08:05

## 2022-05-03 RX ADMIN — THIAMINE HCL TAB 100 MG 100 MG: 100 TAB at 08:05

## 2022-05-03 RX ADMIN — DICYCLOMINE HYDROCHLORIDE 10 MG: 10 CAPSULE ORAL at 08:05

## 2022-05-03 RX ADMIN — THERA TABS 1 TABLET: TAB at 08:05

## 2022-05-03 RX ADMIN — FOLIC ACID 1 MG: 1 TABLET ORAL at 08:05

## 2022-05-03 RX ADMIN — HEPARIN SODIUM 5000 UNITS: 5000 INJECTION INTRAVENOUS; SUBCUTANEOUS at 10:05

## 2022-05-03 RX ADMIN — OXYCODONE 5 MG: 5 TABLET ORAL at 08:05

## 2022-05-03 RX ADMIN — LISINOPRIL 20 MG: 20 TABLET ORAL at 08:05

## 2022-05-03 RX ADMIN — DICYCLOMINE HYDROCHLORIDE 10 MG: 10 CAPSULE ORAL at 05:05

## 2022-05-03 RX ADMIN — OXYCODONE 5 MG: 5 TABLET ORAL at 05:05

## 2022-05-03 RX ADMIN — ROPINIROLE HYDROCHLORIDE 0.25 MG: 0.25 TABLET, FILM COATED ORAL at 08:05

## 2022-05-03 RX ADMIN — DICYCLOMINE HYDROCHLORIDE 10 MG: 10 CAPSULE ORAL at 01:05

## 2022-05-03 RX ADMIN — LEVOTHYROXINE SODIUM 50 MCG: 50 TABLET ORAL at 05:05

## 2022-05-03 RX ADMIN — PANCRELIPASE 1 CAPSULE: 60000; 12000; 38000 CAPSULE, DELAYED RELEASE PELLETS ORAL at 05:05

## 2022-05-03 RX ADMIN — CEFTRIAXONE 1 G: 1 INJECTION, SOLUTION INTRAVENOUS at 01:05

## 2022-05-03 RX ADMIN — SODIUM CHLORIDE, SODIUM LACTATE, POTASSIUM CHLORIDE, AND CALCIUM CHLORIDE: .6; .31; .03; .02 INJECTION, SOLUTION INTRAVENOUS at 01:05

## 2022-05-03 RX ADMIN — ONDANSETRON 4 MG: 2 INJECTION INTRAMUSCULAR; INTRAVENOUS at 08:05

## 2022-05-03 RX ADMIN — PANCRELIPASE 1 CAPSULE: 60000; 12000; 38000 CAPSULE, DELAYED RELEASE PELLETS ORAL at 08:05

## 2022-05-03 RX ADMIN — DIAZEPAM 10 MG: 5 INJECTION, SOLUTION INTRAMUSCULAR; INTRAVENOUS at 04:05

## 2022-05-03 RX ADMIN — METOPROLOL SUCCINATE 50 MG: 50 TABLET, EXTENDED RELEASE ORAL at 08:05

## 2022-05-03 RX ADMIN — ANASTROZOLE 1 MG: 1 TABLET, COATED ORAL at 09:05

## 2022-05-03 RX ADMIN — DULOXETINE 60 MG: 30 CAPSULE, DELAYED RELEASE ORAL at 08:05

## 2022-05-03 RX ADMIN — GABAPENTIN 600 MG: 300 CAPSULE ORAL at 03:05

## 2022-05-03 RX ADMIN — PANTOPRAZOLE SODIUM 40 MG: 40 TABLET, DELAYED RELEASE ORAL at 08:05

## 2022-05-03 RX ADMIN — ACETAMINOPHEN 650 MG: 325 TABLET, FILM COATED ORAL at 04:05

## 2022-05-03 RX ADMIN — METHOCARBAMOL TABLETS 750 MG: 750 TABLET, COATED ORAL at 08:05

## 2022-05-03 RX ADMIN — TRAZODONE HYDROCHLORIDE 300 MG: 100 TABLET ORAL at 11:05

## 2022-05-03 RX ADMIN — HEPARIN SODIUM 5000 UNITS: 5000 INJECTION INTRAVENOUS; SUBCUTANEOUS at 05:05

## 2022-05-03 RX ADMIN — FERROUS SULFATE TAB 325 MG (65 MG ELEMENTAL FE) 1 EACH: 325 (65 FE) TAB at 08:05

## 2022-05-03 RX ADMIN — HEPARIN SODIUM 5000 UNITS: 5000 INJECTION INTRAVENOUS; SUBCUTANEOUS at 03:05

## 2022-05-03 RX ADMIN — ARIPIPRAZOLE 5 MG: 5 TABLET ORAL at 08:05

## 2022-05-03 RX ADMIN — PANCRELIPASE 1 CAPSULE: 60000; 12000; 38000 CAPSULE, DELAYED RELEASE PELLETS ORAL at 01:05

## 2022-05-03 NOTE — PLAN OF CARE
Problem: Adult Inpatient Plan of Care  Goal: Plan of Care Review  Outcome: Ongoing, Progressing  Goal: Optimal Comfort and Wellbeing  Outcome: Ongoing, Progressing     Problem: Infection  Goal: Absence of Infection Signs and Symptoms  Outcome: Ongoing, Progressing     Problem: Skin Injury Risk Increased  Goal: Skin Health and Integrity  Outcome: Ongoing, Progressing

## 2022-05-03 NOTE — PLAN OF CARE
Problem: Physical Therapy  Goal: Physical Therapy Goal  Outcome: Met     Patient evaluated and no acute care PT goals identified. Patient with independence with household mobility without gross instability, limited in longer gait bouts d/t decreased endurance and impaired cardiopulm response to activity (pt reports baseline).     During this hospitalization, patient does not require further acute PT services.  Please re-consult if situation changes.  Recommendation for d/c to home with  PT for continued work to address impaired endurance.

## 2022-05-03 NOTE — PT/OT/SLP PROGRESS
Physical Therapy  Not Seen    Patient Name:  Earl Abdul   MRN:  [id number]    Patient not seen today secondary to Patient fatigue (requests PT f/u later). Will follow-up as schedule allows.

## 2022-05-03 NOTE — PLAN OF CARE
Problem: Adult Inpatient Plan of Care  Goal: Plan of Care Review  5/3/2022 0502 by Sheila Banks RN  Outcome: Ongoing, Progressing  5/3/2022 0444 by Sheila Banks RN  Outcome: Ongoing, Progressing  Goal: Patient-Specific Goal (Individualized)  5/3/2022 0502 by Sheila Banks RN  Outcome: Ongoing, Progressing  5/3/2022 0444 by Sheila Banks RN  Outcome: Ongoing, Progressing  Goal: Absence of Hospital-Acquired Illness or Injury  5/3/2022 0502 by Sheila Banks RN  Outcome: Ongoing, Progressing  5/3/2022 0444 by Sheila Banks RN  Outcome: Ongoing, Progressing  Goal: Optimal Comfort and Wellbeing  5/3/2022 0502 by Sheila Banks RN  Outcome: Ongoing, Progressing  5/3/2022 0444 by Sheila Banks RN  Outcome: Ongoing, Progressing  Goal: Readiness for Transition of Care  5/3/2022 0502 by Sheila Banks RN  Outcome: Ongoing, Progressing  5/3/2022 0444 by Sheila Banks RN  Outcome: Ongoing, Progressing     Problem: Diabetes Comorbidity  Goal: Blood Glucose Level Within Targeted Range  5/3/2022 0502 by Sheila Banks RN  Outcome: Ongoing, Progressing  5/3/2022 0444 by Sheila Banks RN  Outcome: Ongoing, Progressing     Problem: Infection  Goal: Absence of Infection Signs and Symptoms  5/3/2022 0502 by Sheila Banks RN  Outcome: Ongoing, Progressing  5/3/2022 0444 by Sheila Banks RN  Outcome: Ongoing, Progressing     Problem: Impaired Wound Healing  Goal: Optimal Wound Healing  5/3/2022 0502 by Sheila Banks RN  Outcome: Ongoing, Progressing  5/3/2022 0444 by Sheila Banks RN  Outcome: Ongoing, Progressing     Problem: Skin Injury Risk Increased  Goal: Skin Health and Integrity  5/3/2022 0502 by Sheila Banks RN  Outcome: Ongoing, Progressing  5/3/2022 0444 by Sheila Banks RN  Outcome: Ongoing, Progressing     Problem: Activity and Energy Impairment (Anxiety Signs/Symptoms)  Goal: Optimized Energy Level (Anxiety Signs/Symptoms)  5/3/2022 0502 by Neansha Banks, RN  Outcome: Ongoing, Progressing  5/3/2022 0445 by  Sheila Banks RN  Outcome: Ongoing, Progressing     Problem: Mood Impairment (Anxiety Signs/Symptoms)  Goal: Improved Mood Symptoms (Anxiety Signs/Symptoms)  5/3/2022 0502 by Sheila Banks RN  Outcome: Ongoing, Progressing  5/3/2022 0445 by Sheila Banks RN  Outcome: Ongoing, Progressing     Problem: Nausea and Vomiting  Goal: Fluid and Electrolyte Balance  5/3/2022 0502 by Sheila Banks RN  Outcome: Ongoing, Progressing  5/3/2022 0445 by Sheila Banks RN  Outcome: Ongoing, Progressing     Problem: Pain Acute  Goal: Acceptable Pain Control and Functional Ability  5/3/2022 0502 by Sheila Banks RN  Outcome: Ongoing, Progressing  5/3/2022 0445 by Sheila Banks RN  Outcome: Ongoing, Progressing     Problem: Gas Exchange Impaired  Goal: Optimal Gas Exchange  5/3/2022 0502 by Sheila Banks RN  Outcome: Ongoing, Progressing  5/3/2022 0446 by Sheila Banks RN  Outcome: Ongoing, Progressing

## 2022-05-03 NOTE — PLAN OF CARE
Pt has not required any medication today for anxiety based on CIWA scale and pt verbalization. Has been cooperative and no tremors /ss of seizure activity . Denies headache fullness or any other neurological symptoms. Appetite fair. Tolerating po well. IVF infusing.to Rt upper arm midline. Accu checks monitored. Blood glucose stable.No loose stools noted today. Stool speciman for  C Diff negative and contact isolation dc'd.

## 2022-05-03 NOTE — PT/OT/SLP EVAL
Physical Therapy Evaluation, Treatment, and Discharge Note    Patient Name:  Earl Abdul   MRN:  [id number]    Recommendations:     Discharge Recommendations:  home health PT   Discharge Equipment Recommendations: shower chair (pt defers need for BSC)   Barriers to discharge: None    Assessment:     Earl Abdul is a 61 y.o. female admitted with a medical diagnosis of Alcohol withdrawal - admitted after a few weeks of N/V/D. Pmhx significant for HTN, depression, etoh with pancreatitis and WD seizures, COPD, breast ca sp mastectomy and reconstruction.     Patient evaluated and no acute care PT goals identified. Patient with independence with household mobility without gross instability, limited in longer gait bouts d/t decreased endurance and impaired cardiopulm response to activity (pt reports baseline).      During this hospitalization, patient does not require further acute PT services.  Please re-consult if situation changes.  Recommendation for d/c to home with HH PT for continued work to address impaired endurance.    Recent Surgery: * No surgery found *      Plan:     During this hospitalization, patient does not require further acute PT services.  Please re-consult if situation changes.      Subjective     Chief Complaint: Has been unable to walk in hallway or in room without nursing present  Patient/Family Comments/goals: Remarks she is going to [place] in 1 week; Patient agreeable to evaluation.  Pain/Comfort:  · Pain Rating 1: 0/10  · Pain Rating Post-Intervention 1: 0/10    Patients cultural, spiritual, Scientology conflicts given the current situation: no    Living Environment:  · Pt lives with her  in a 3 story home with flight of steps between her bedroom and living area - pt reports doesn't leave house much.   · Upon discharge, patient will have assistance from her .  Prior level of function:  · Ambulation: Indep  · Reports some difficulty with stairs and longer ambulation  bouts  · Wears O2 PRN  · ADL's: Indep  · IADLs: Indep  · Falls: Denies falls, endorses stumbles into walls/cabinest (bruise on UE)  · Equipment used at home: none.     Objective:     Communicated with CLAUDIA Mims prior to session.  Patient found sitting edge of bed with peripheral IV (gee sys) upon PT entry to room.    General Precautions: Standard,     Orthopedic Precautions:N/A   Braces: N/A   Respiratory Status: Room air    Patient donned slippers and gait belt for OOB mobility. Patient donned mask for hallway ambulation.    Exams:  · Cognition:   · Patient is oriented x4.  · Pt follows approximately 100% of one and two step commands.    · Mood: Pleasant and cooperative.   · Safety Awareness: Good  · Musculoskeletal:  · BMI: 34.16  · Posture:  Forward head, rounded shoulders  · LE ROM/Strength:   · R ROM: WFL  · L ROM: WFL  · R Strength: WFL  · L Strength: WFL  · Neuromuscular:  · Coordination/Tone/Reflexes: No impairments identified with functional mobility. No formal testing performed.  · No tremulousness noted  · Balance:   · Static sitting: Good  · Static standing: Good  · Dynamic standing: Fair  · Visual-vestibular: No impairments identified with functional mobility. No formal testing performed.  · Integument: Bruising of BUE  · Cardiopulmonary:  · O2 Requirements: RA  · Vital signs:   · Pre(sitting): SpO2 100%  · During(ambulation): SpO2 initially 99% slow decline to 87%, recovered to 90s after 3 min standing rest break  · Donned O2 on return to room  · Post(sitting, 2L NC): SpO2 100%  · Edema: None noted    Functional Mobility:  · Transfers:     · Sit to Stand:  independence with no AD  · Gait: x300 ft with no AD progressing to use of guard rail for uni UE support and independence progressing to SBA as pt fatigued.   · INitial gait with good speed, no pathway deviations, and no gross imbalances noted.   · After 150 ft, pt with increased fatigued with increased gait instability, SOB, and pathway deviations  without overt LOB.     AM-PAC 6 CLICK MOBILITY  Total Score:23       Therapeutic Activities and Exercises:  ·  TA:  · Discussion of HEP/walking program including rec to have family or staff SBA for hallway ambulation and to walk in hallway nearest room only for quick return to room if fatigued  · Encouragement of increased OOB mobility ad sara to improve endurance  PT educated patient re:   PT plan of care/role of PT  Safety with OOB mobility  Use of RW, BSC  Discharge disposition    Pt verbalized understanding       AM-PAC 6 CLICK MOBILITY  Total Score:23     Patient left sitting edge of bed with all lines intact, call button in reach, RN Gregorio notified, friend and gee sys present and white board updated.    GOALS:   Multidisciplinary Problems     Physical Therapy Goals     Not on file          Multidisciplinary Problems (Resolved)        Problem: Physical Therapy    Goal Priority Disciplines Outcome Goal Variances Interventions   Physical Therapy Goal   (Resolved)     PT, PT/OT Met                     History:     Past Medical History:   Diagnosis Date    Anemia     Anemia     Controlled type 2 diabetes mellitus without complication, without long-term current use of insulin 11/30/2021    COPD (chronic obstructive pulmonary disease)     Depression     Diverticulitis     Fatty liver     GERD (gastroesophageal reflux disease)     Hyperlipidemia     Hypertension     Pancreatitis     Peptic ulcer disease     Polysubstance abuse     Posterior reversible encephalopathy syndrome     Sarcoidosis of lung     Sarcoidosis of lung     over 30 yrs ago    Seizures     7/2017    Suicide attempt     Suicide ideation        Past Surgical History:   Procedure Laterality Date    APPENDECTOMY      BILATERAL MASTECTOMY Bilateral 10/29/2020    Procedure: MASTECTOMY, BILATERAL;  Surgeon: Baylee Kevin MD;  Location: [facility] OR [address] [place] [address];  Service: General;  Laterality: Bilateral;    BREAST REVISION SURGERY Bilateral  2/11/2021    Procedure: BREAST REVISION SURGERY;  Surgeon: Scottie Johnson MD;  Location: [facility] OR [address] [place] [address];  Service: Plastics;  Laterality: Bilateral;    COLONOSCOPY N/A 7/28/2017    Procedure: COLONOSCOPY;  Surgeon: Aaron Alvarado MD;  Location: [facility];  Service: Endoscopy;  Laterality: N/A;    ESOPHAGOGASTRODUODENOSCOPY  10/7/2016, 11/6/2014    2016 - gastritis, duodenitis, 2014 erosive gastritis    ESOPHAGOGASTRODUODENOSCOPY N/A 2/11/2020    Procedure: ESOPHAGOGASTRODUODENOSCOPY (EGD);  Surgeon: Fawn Garrido MD;  Location: [facility] ENDO;  Service: Endoscopy;  Laterality: N/A;    ESOPHAGOGASTRODUODENOSCOPY N/A 4/19/2021    Procedure: EGD (ESOPHAGOGASTRODUODENOSCOPY);  Surgeon: Paramjit Martino MD;  Location: [facility];  Service: Endoscopy;  Laterality: N/A;    FLEXIBLE SIGMOIDOSCOPY  11/06/2014    colitis    HYSTERECTOMY      INJECTION FOR SENTINEL NODE IDENTIFICATION Right 10/29/2020    Procedure: INJECTION, FOR SENTINEL NODE IDENTIFICATION;  Surgeon: Baylee Kevin MD;  Location: [address];  Service: General;  Laterality: Right;    INJECTION OF JOINT Right 10/10/2019    Procedure: Injection, Joint RIGHT ILIOPSOAS BURSA/TENDON INJECTION AND RIGHT GLUTEAL TENDON INJECTION WITH STEROID AND LIDOCAINE;  Surgeon: Guillaume Rico MD;  Location: [facility];  Service: Pain Management;  Laterality: Right;  NEEDS CONSENT    INSERTION OF BREAST TISSUE EXPANDER Bilateral 10/29/2020    Procedure: INSERTION, TISSUE EXPANDER, BREAST;  Surgeon: Scottie Johnson MD;  Location: [address];  Service: Plastics;  Laterality: Bilateral;  Right breast: 1082 g  Left breast: 1076 g    LIPOSUCTION Bilateral 2/11/2021    Procedure: LIPOSUCTION;  Surgeon: Scottie Johnson MD;  Location: [facility] OR [address] [place] [address];  Service: Plastics;  Laterality: Bilateral;    mediastenoscopy      REPLACEMENT OF IMPLANT OF BREAST Bilateral 2/11/2021    Procedure: REPLACEMENT, IMPLANT, BREAST;  Surgeon: Scottie GOMEZ  MD Alex;  Location: [facility] OR [address] [place] [address];  Service: Plastics;  Laterality: Bilateral;    SENTINEL LYMPH NODE BIOPSY Right 10/29/2020    Procedure: BIOPSY, LYMPH NODE, SENTINEL;  Surgeon: Baylee Kevin MD;  Location: [facility] OR [address] [place] [address];  Service: General;  Laterality: Right;    TONSILLECTOMY N/A 1970    TUBAL LIGATION         Time Tracking:     PT Received On: 05/03/22  PT Start Time: 1450     PT Stop Time: 1510  PT Total Time (min): 20 min     Billable Minutes: Evaluation 10 and Therapeutic Activity 10      05/03/2022

## 2022-05-04 VITALS
DIASTOLIC BLOOD PRESSURE: 77 MMHG | TEMPERATURE: 98 F | HEART RATE: 78 BPM | OXYGEN SATURATION: 96 % | BODY MASS INDEX: 34.01 KG/M2 | RESPIRATION RATE: 16 BRPM | SYSTOLIC BLOOD PRESSURE: 160 MMHG | WEIGHT: 211.63 LBS | HEIGHT: 66 IN

## 2022-05-04 PROBLEM — B96.20 E COLI BACTEREMIA: Status: ACTIVE | Noted: 2022-05-04

## 2022-05-04 PROBLEM — R78.81 E COLI BACTEREMIA: Status: ACTIVE | Noted: 2022-05-04

## 2022-05-04 LAB
BACTERIA BLD CULT: ABNORMAL
POCT GLUCOSE: 131 MG/DL (ref 70–110)
POCT GLUCOSE: 145 MG/DL (ref 70–110)
POCT GLUCOSE: 167 MG/DL (ref 70–110)

## 2022-05-04 PROCEDURE — 99222 1ST HOSP IP/OBS MODERATE 55: CPT | Mod: ,,, | Performed by: INTERNAL MEDICINE

## 2022-05-04 PROCEDURE — 63600175 PHARM REV CODE 636 W HCPCS: Performed by: INTERNAL MEDICINE

## 2022-05-04 PROCEDURE — 99239 HOSP IP/OBS DSCHRG MGMT >30: CPT | Mod: ,,, | Performed by: HOSPITALIST

## 2022-05-04 PROCEDURE — 99222 PR INITIAL HOSPITAL CARE,LEVL II: ICD-10-PCS | Mod: ,,, | Performed by: INTERNAL MEDICINE

## 2022-05-04 PROCEDURE — 25000003 PHARM REV CODE 250: Performed by: INTERNAL MEDICINE

## 2022-05-04 PROCEDURE — 25000003 PHARM REV CODE 250: Performed by: HOSPITALIST

## 2022-05-04 PROCEDURE — 99239 PR HOSPITAL DISCHARGE DAY,>30 MIN: ICD-10-PCS | Mod: ,,, | Performed by: HOSPITALIST

## 2022-05-04 RX ORDER — HYDROXYZINE PAMOATE 25 MG/1
25 CAPSULE ORAL 3 TIMES DAILY
Status: ON HOLD | COMMUNITY
Start: 2022-02-26 | End: 2022-05-04 | Stop reason: HOSPADM

## 2022-05-04 RX ORDER — ARIPIPRAZOLE 10 MG/1
10 TABLET ORAL EVERY MORNING
COMMUNITY
Start: 2022-02-15 | End: 2022-08-22

## 2022-05-04 RX ORDER — DULOXETIN HYDROCHLORIDE 60 MG/1
60 CAPSULE, DELAYED RELEASE ORAL NIGHTLY
COMMUNITY
Start: 2022-04-26 | End: 2023-03-10

## 2022-05-04 RX ORDER — METOPROLOL SUCCINATE 25 MG/1
25 TABLET, EXTENDED RELEASE ORAL DAILY
COMMUNITY
Start: 2022-02-09 | End: 2022-06-29

## 2022-05-04 RX ORDER — LANOLIN ALCOHOL/MO/W.PET/CERES
100 CREAM (GRAM) TOPICAL DAILY
Qty: 30 TABLET | Refills: 0 | Status: CANCELLED | OUTPATIENT
Start: 2022-05-05

## 2022-05-04 RX ORDER — IBUPROFEN 200 MG
1 TABLET ORAL DAILY
Qty: 30 PATCH | Refills: 0 | Status: SHIPPED | OUTPATIENT
Start: 2022-05-05 | End: 2022-06-04

## 2022-05-04 RX ORDER — CIPROFLOXACIN 500 MG/1
500 TABLET ORAL EVERY 12 HOURS
Qty: 20 TABLET | Refills: 0 | Status: SHIPPED | OUTPATIENT
Start: 2022-05-04 | End: 2022-05-14

## 2022-05-04 RX ORDER — TRAZODONE HYDROCHLORIDE 300 MG/1
300 TABLET ORAL NIGHTLY
COMMUNITY
Start: 2021-11-11 | End: 2022-06-27 | Stop reason: SDUPTHER

## 2022-05-04 RX ADMIN — LISINOPRIL 20 MG: 20 TABLET ORAL at 08:05

## 2022-05-04 RX ADMIN — ATORVASTATIN CALCIUM 10 MG: 10 TABLET, FILM COATED ORAL at 08:05

## 2022-05-04 RX ADMIN — ANASTROZOLE 1 MG: 1 TABLET, COATED ORAL at 08:05

## 2022-05-04 RX ADMIN — METOPROLOL SUCCINATE 50 MG: 50 TABLET, EXTENDED RELEASE ORAL at 08:05

## 2022-05-04 RX ADMIN — THERA TABS 1 TABLET: TAB at 08:05

## 2022-05-04 RX ADMIN — DULOXETINE 60 MG: 30 CAPSULE, DELAYED RELEASE ORAL at 08:05

## 2022-05-04 RX ADMIN — DICYCLOMINE HYDROCHLORIDE 10 MG: 10 CAPSULE ORAL at 12:05

## 2022-05-04 RX ADMIN — FOLIC ACID 1 MG: 1 TABLET ORAL at 08:05

## 2022-05-04 RX ADMIN — OXYCODONE 5 MG: 5 TABLET ORAL at 03:05

## 2022-05-04 RX ADMIN — ARIPIPRAZOLE 5 MG: 5 TABLET ORAL at 08:05

## 2022-05-04 RX ADMIN — LEVOTHYROXINE SODIUM 50 MCG: 50 TABLET ORAL at 05:05

## 2022-05-04 RX ADMIN — GABAPENTIN 600 MG: 300 CAPSULE ORAL at 02:05

## 2022-05-04 RX ADMIN — GABAPENTIN 600 MG: 300 CAPSULE ORAL at 08:05

## 2022-05-04 RX ADMIN — PANCRELIPASE 1 CAPSULE: 60000; 12000; 38000 CAPSULE, DELAYED RELEASE PELLETS ORAL at 08:05

## 2022-05-04 RX ADMIN — OXYCODONE 5 MG: 5 TABLET ORAL at 05:05

## 2022-05-04 RX ADMIN — HEPARIN SODIUM 5000 UNITS: 5000 INJECTION INTRAVENOUS; SUBCUTANEOUS at 05:05

## 2022-05-04 RX ADMIN — HEPARIN SODIUM 5000 UNITS: 5000 INJECTION INTRAVENOUS; SUBCUTANEOUS at 01:05

## 2022-05-04 RX ADMIN — PANTOPRAZOLE SODIUM 40 MG: 40 TABLET, DELAYED RELEASE ORAL at 08:05

## 2022-05-04 RX ADMIN — PANCRELIPASE 1 CAPSULE: 60000; 12000; 38000 CAPSULE, DELAYED RELEASE PELLETS ORAL at 12:05

## 2022-05-04 RX ADMIN — SODIUM CHLORIDE, SODIUM LACTATE, POTASSIUM CHLORIDE, AND CALCIUM CHLORIDE: .6; .31; .03; .02 INJECTION, SOLUTION INTRAVENOUS at 08:05

## 2022-05-04 RX ADMIN — THIAMINE HCL TAB 100 MG 100 MG: 100 TAB at 08:05

## 2022-05-04 RX ADMIN — CEFTRIAXONE 1 G: 1 INJECTION, SOLUTION INTRAVENOUS at 12:05

## 2022-05-04 RX ADMIN — METHOCARBAMOL TABLETS 750 MG: 750 TABLET, COATED ORAL at 03:05

## 2022-05-04 RX ADMIN — DICYCLOMINE HYDROCHLORIDE 10 MG: 10 CAPSULE ORAL at 08:05

## 2022-05-04 RX ADMIN — ACETAMINOPHEN 650 MG: 325 TABLET, FILM COATED ORAL at 02:05

## 2022-05-04 RX ADMIN — FERROUS SULFATE TAB 325 MG (65 MG ELEMENTAL FE) 1 EACH: 325 (65 FE) TAB at 08:05

## 2022-05-04 NOTE — PLAN OF CARE
LMSW met with PT at bedside to discuss home health options. PT gave preferences. LMSW sent referral to [facility] and [facility].  Pending Acceptance.

## 2022-05-04 NOTE — NURSING
05/04/2022        Pt verbalized understanding D/C instructions and education provided pertinent to D/C needs. IV cath removed fully intact. No distress noted. Pt transferred per wheelchair per transport to awaiting vehicle.          Tamiko Mcclure RN

## 2022-05-04 NOTE — ASSESSMENT & PLAN NOTE
- due to pyelonephritis by exam and imaging  - pan-susceptible isolate  - improved  - ok to transition to Cipro 500 mg po BID x 10 days, at your discretion

## 2022-05-04 NOTE — PHYSICIAN QUERY
PT Name: Earl Abdul  MR #: [id number]     DOCUMENTATION CLARIFICATION     CDS/: Celi Nixon RN, BSN, CCDS               Contact information:  [email or web address]     This form is a permanent document in the medical record.     Query Date: May 4, 2022    By submitting this query, we are merely seeking further clarification of documentation.  Please utilize your independent clinical judgment when addressing the question(s) below.    The Medical Record contains the following:   Indicators   Supporting Clinical Findings Location in Medical Record    Non-blanchable erythema/redness       X Ulcer/Injury/Skin Breakdown   Date First Assessed/Time First Assessed: 04/29/22 1644   Altered Skin Integrity Present on Admission: yes  Location: Buttocks  Is this injury device related?: No  Primary Wound Type:  Moisture associated dermatitis   05/02 Wound Care PN    Deep Tissue Injury       X Wound care consult   Date First Assessed/Time First Assessed: 04/29/22 1644   Altered Skin Integrity Present on Admission: yes  Location: Buttocks  Is this injury device related?: No  Primary Wound Type:  Moisture associated dermatitis   05/02 Wound Care PN   X Acute/Chronic Illness   Pyelonephritis due to E Coli  Alcohol Withdrawal  HTN  DM II  Anemia  COPD      X Medication/Treatment   Cleansed with:;Soap and water;Applied:;Skin Barrier 05/02 Wound Care PN   X Other Darnell Score 18 05/02 Wound Care PN     The clinical guidelines noted are only a system guideline. It does not replace the providers clinical judgment.    Per the National Pressure Injury Advisory Panel:   A pressure injury is localized damage to the skin and underlying soft tissue usually over a bony prominence or related to a medical or other device. The injury can present as intact skin or an open ulcer and may be painful. The injury occurs as a result of intense and/or prolonged pressure or pressure in combination with shear. The tolerance of soft  tissue for pressure and shear may also be affected by microclimate, nutrition, perfusion, co-morbidities and condition of the soft tissue.       Provider, please provide the integumentary diagnosis related to the documentation of buttocks:     [ x  ] Moisture associated dermatitis     [   ] Other Integumentary Diagnosis (please specify):______________     [  ] Clinically Undetermined             Please document in your progress notes daily for the duration of treatment until resolved and include in your discharge summary.    Reference:    LYNDSAY Ramirez., ALEXEY High., Goldberg, M., THOMAS Goyal., LYNDSAY Crowell, & SANJEEV Moran. (2016). Revised National Pressure Ulcer Advisory Panel Pressure Injury Staging System: Revised Pressure Injury Staging System. J Wound Ostomy Continence Nurs, 43(6), 585-597. doi:10.1097/won.[id number]    Form No.[id number]

## 2022-05-04 NOTE — PROGRESS NOTES
[facility] Medicine  Progress Note    Patient Name: Earl Abdul  MRN: [id number]  Patient Class: IP- Inpatient   Admission Date: 4/29/2022  Length of Stay: 4 days  Attending Physician: Gallo Diaz MD  Primary Care Provider: Andrew Rodriguez MD        Subjective:     Principal Problem:Pyelonephritis due to Escherichia coli        HPI:  Patient is a 61 year old female with a PMH significant for HTN, HLD, Depression (Suicide Attempts in past), Alcohol Abuse (drinks a half of a fifth of Whiskey per day) complicated with withdrawal seizures in past and pancreatitis , COPD (prn and qHS Oxygen of 2-3L),  HTN, HLD, Sarcoidosis of Lung (not active), Hepatic Steatosis, Hypothyroidism, PUD/Gastritis, Breast Cancer s/p double mastectomy and breast reconstruction who presented with N/V/D without abdominal pain for past week.  Patient states she started Restoril a week ago for insomnia, and since has developed N/V/D that she thought was due to the Restoril or withdrawing from Trazodone.  She restarted her Trazodone, but continued with these symptoms.  She denies abdominal pain.  No chest pain or SOB.  Patient last had a drink of alcohol yesterday, but was unable to keep most of it down.  No significant headache or change in vision.  No F/c.  No dysuria.  She was given IV NS x 1L in ED, as well as Zofran, Banana Bag, IV Thiamine, and Ativan 2MG IV.  CIWA-Ar at the time of my interview was 10.  Patient is being admitted for Alcohol Withdrawal and Ketoacidosis.      Overview/Hospital Course:  Patient presented with 1 week of N/V/D.  Unclear if this is related to withdrawal at this time, but CIWA-AR is elevated and evidence of ketoacidosis on admission labs.  No abdominal pain.  Last drink was day PTA.  LFTs with T bili of 0.8, Alk phos of 63, AST/ALT 42/41.  Given Ativan 2mg in ED, in addition to IV fluids and anti-emetics.  Fever overnight on 4/29/2022 and again yesterday.  Labs on  admission with WBC normal, Procal and Lactic Acid normal.  Blood cultures on 4/29/2022 with 2/4 bottles of E coli - source likely urinary given Urine Culture growing GNR as well (E coli).  Repeat blood culture on 4/30/2022 also positive.  Repeat blood culture on 5/1/2022 with NGTD. CT A/p with contrast showed right perinephric/periureteral stranding with mild ureteral wall thickening and abnormal enhancement of the right kidney in keeping with pyelonephritis.  Started on IV Zosyn on 4/30/2022 and de-escalated antibiotics to IV Ceftriaxone.      Interval History: No acute events.    Review of Systems   Constitutional:  Negative for chills and fever.   Respiratory:  Negative for shortness of breath.    Cardiovascular:  Negative for chest pain.   Gastrointestinal:  Negative for abdominal pain, nausea and vomiting.   Genitourinary:  Negative for dysuria and frequency.   Objective:     Vital Signs (Most Recent):  Temp: 98.8 °F (37.1 °C) (05/03/22 1949)  Pulse: 71 (05/03/22 1949)  Resp: 18 (05/03/22 2010)  BP: (!) 166/70 (05/03/22 1949)  SpO2: 98 % (05/03/22 1949)   Vital Signs (24h Range):  Temp:  [97.6 °F (36.4 °C)-98.8 °F (37.1 °C)] 98.8 °F (37.1 °C)  Pulse:  [] 71  Resp:  [14-20] 18  SpO2:  [92 %-100 %] 98 %  BP: (126-166)/(62-77) 166/70     Weight: 96 kg (211 lb 10.3 oz)  Body mass index is 34.16 kg/m².    Intake/Output Summary (Last 24 hours) at 5/3/2022 2139  Last data filed at 5/3/2022 1733  Gross per 24 hour   Intake 1657.52 ml   Output --   Net 1657.52 ml      Physical Exam  Constitutional:       General: She is not in acute distress.  HENT:      Head: Atraumatic.   Eyes:      Conjunctiva/sclera: Conjunctivae normal.   Cardiovascular:      Rate and Rhythm: Normal rate and regular rhythm.      Heart sounds: Normal heart sounds. No murmur heard.  Pulmonary:      Effort: Pulmonary effort is normal.      Breath sounds: Normal breath sounds. No wheezing.   Abdominal:      General: Bowel sounds are normal. There  is no distension.      Palpations: Abdomen is soft.      Tenderness: There is no abdominal tenderness.   Musculoskeletal:         General: No deformity. Normal range of motion.      Cervical back: Neck supple.   Neurological:      Mental Status: She is alert and oriented to person, place, and time.       Significant Labs: All pertinent labs within the past 24 hours have been reviewed.    Significant Imaging: I have reviewed all pertinent imaging results/findings within the past 24 hours.        Assessment/Plan:      * Pyelonephritis due to Escherichia coli  Fever overnight on 4/29/2022 and again yesterday.  Labs on admission with WBC normal, Procal and Lactic Acid normal.  Blood cultures on 4/29/2022 with 2/4 bottles of E coli - source likely urinary given Urine Culture growing GNR as well (E coli).  Repeat blood culture on 4/30/2022 also positive.  Repeat blood culture on 5/1/2022 with NGTD. CT A/p with contrast showed right perinephric/periureteral stranding with mild ureteral wall thickening and abnormal enhancement of the right kidney in keeping with pyelonephritis.  Started on IV Zosyn on 4/30/2022.  -De-escalate antibiotics to IV Ceftriaxone today  -Follow up Blood Culture results  Consult ID for treatment recommendations.        Alcohol withdrawal  Patient presented with 1 week of N/V/D.  Unclear if this is related to withdrawal at this time, but CIWA-AR is elevated and evidence of ketoacidosis on admission labs.  No abdominal pain.  Last drink was day PTA.  LFTs with T bili of 0.8, Alk phos of 63, AST/ALT 42/41.  Given Ativan 2mg in ED, in addition to IV fluids and anti-emetics.  Received 2 dose of IV Valium yesterday.  CIWA this morning was <8 this morning.    Plan:  Continue with CIWA-Ar q 4 with IV Valium 10mg prn CIWA >8  Continue with IV fluids with LR at 100ml/hr  Advance diet to FLD and ADAT  Pain control and Anti-emetics prn  Thiamine/Folic Acid/MVI daily  Seizure and Fall precautions  SW consult  PT  consult      Essential hypertension  Home Meds:  Lisinopril 20mg once a day, HCTZ 25mg, and Metoprolol XL 50mg  -Continue Lisinopril and Metoprolol for now  -Monitor and adjust as needed      Alcoholic ketoacidosis  AG 22 with beta-hb elevated at 4.4.  Glucose 69.  Started on IV fluids and po diet.  AG closed and Glucose stable.  -Continue with IV fluids   -ADAT      Obesity (BMI 30.0-34.9)  BMI 33.27 - patient would benefit for weight reduction      Type 2 diabetes mellitus with hyperglycemia  Home Meds:  Metformin XR 1g bid, Ozempic q7 days  A1C 6.6 in 3/2022.  -Hold Home Meds  -POCT Glucose qAC and HS  -Low-correction dose SSI prn  -Diabetic Diet      Hypothyroidism  TSH normal on admission  -Continue Levothyroxine      Anemia  Hgb 12.1 on admission and dropped to 10.6 after IV fluids  Labs in 1/2022 with Ferritin 36 and iron sat of 18%.  B12 403.  -Start on FeSO4 325mg daily - no stool softener given diarrhea  -Check Folate level  -Follow CBC      COPD (chronic obstructive pulmonary disease)  Uses Home Oxygen at night (2-3L).  Quit smoking in 2/2022 per Pulmonary Note.  Mild VINICIO - did not need CPAP.  History of Sarcoidosis - not on active treatment.  Home Meds:  Atrovent HFA, Stiolto Respimat  Last seen by Dr. Patel with Pulmonary in 12/2020.  No Tachypnea on admission.  Pox stable on 2L O2NC  Lungs clear.     Plan:  Continue Duonebs q6 prn  Continue O2 NC 2L  CXR as above  Follow closely      Malignant neoplasm of central portion of right breast in female, estrogen receptor positive  Mammogram on September 4, 2020 showed a focal asymmetry in the retroareolar region of the right breast.  Ultrasound at that time showed a 9 x 5 x 8 mm hypoechoic mass at 12:00 p.m.  Biopsy on September 29, 2020 showed grade 2 infiltrating ductal carcinoma (histologic grade 3, nuclear grade 2, mitotic index 2).  Tumor was 95% ER positive 20-30% TX positive and HER2 was 2+ but negative by FISH.  Ki-67 was 80%.  On October 29, 2020  bilateral mastectomy and right axillary sentinel lymph node biopsy was performed.  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  Margins were negative with closest margin 6 mm.  The sentinel lymph node was negative. Final pathological staging T1b N0 stage IA.  -Continue Arimidex      Hyperlipidemia  -Continue Statin      Depression with anxiety  Home Meds:  Abilify 5mg, Cymbalta 60mg, Trazodone 300mg qHS  -Continue with home meds    Diarrhea  Patient presented with 1 week of N/V/D.  No complaints of abdominal pain.  Has decreased appetite.  Still with nausea and last episode of diarrhea was day PTA.  Mostly described as loose to soft, but sometimes watery. Labs on admission with normal Lipase.  LFTs on admission with mild bump in AST at 42.  No imaging done in ED.    She was evaluated by GI on 4/18/2022 for Loose stools and bloating with Fecal incontinence - TTG, IgA normal at that time.  Pancreatic elastase pending.  She was recommended to start Fibercon 2 tabs daily.   -Follow up Stool C Diff, WBC, Culture  -Continue with Pancreatic enzymes        VTE Risk Mitigation (From admission, onward)         Ordered     Place sequential compression device  Until discontinued         04/29/22 1644     heparin (porcine) injection 5,000 Units  Every 8 hours         04/29/22 1039     IP VTE HIGH RISK PATIENT  Once         04/29/22 1039     Place sequential compression device  Until discontinued         04/29/22 1039                Gallo Diaz MD  Department of Hospital Medicine   [facility] ([facility]

## 2022-05-04 NOTE — SUBJECTIVE & OBJECTIVE
Past Medical History:   Diagnosis Date    Anemia     Anemia     Controlled type 2 diabetes mellitus without complication, without long-term current use of insulin 11/30/2021    COPD (chronic obstructive pulmonary disease)     Depression     Diverticulitis     Fatty liver     GERD (gastroesophageal reflux disease)     Hyperlipidemia     Hypertension     Pancreatitis     Peptic ulcer disease     Polysubstance abuse     Posterior reversible encephalopathy syndrome     Sarcoidosis of lung     Sarcoidosis of lung     over 30 yrs ago    Seizures     7/2017    Suicide attempt     Suicide ideation        Past Surgical History:   Procedure Laterality Date    APPENDECTOMY      BILATERAL MASTECTOMY Bilateral 10/29/2020    Procedure: MASTECTOMY, BILATERAL;  Surgeon: Baylee Kevin MD;  Location: [address];  Service: General;  Laterality: Bilateral;    BREAST REVISION SURGERY Bilateral 2/11/2021    Procedure: BREAST REVISION SURGERY;  Surgeon: Scottie Johnson MD;  Location: [facility] OR [address] [place] [address];  Service: Plastics;  Laterality: Bilateral;    COLONOSCOPY N/A 7/28/2017    Procedure: COLONOSCOPY;  Surgeon: Aaron Alvarado MD;  Location: [facility];  Service: Endoscopy;  Laterality: N/A;    ESOPHAGOGASTRODUODENOSCOPY  10/7/2016, 11/6/2014    2016 - gastritis, duodenitis, 2014 erosive gastritis    ESOPHAGOGASTRODUODENOSCOPY N/A 2/11/2020    Procedure: ESOPHAGOGASTRODUODENOSCOPY (EGD);  Surgeon: Fawn Garrido MD;  Location: [facility];  Service: Endoscopy;  Laterality: N/A;    ESOPHAGOGASTRODUODENOSCOPY N/A 4/19/2021    Procedure: EGD (ESOPHAGOGASTRODUODENOSCOPY);  Surgeon: Paramjit Martino MD;  Location: [facility];  Service: Endoscopy;  Laterality: N/A;    FLEXIBLE SIGMOIDOSCOPY  11/06/2014    colitis    HYSTERECTOMY      INJECTION FOR SENTINEL NODE IDENTIFICATION Right 10/29/2020    Procedure: INJECTION, FOR SENTINEL NODE IDENTIFICATION;  Surgeon: Baylee Kevin MD;  Location: [address];  Service: General;   Laterality: Right;    INJECTION OF JOINT Right 10/10/2019    Procedure: Injection, Joint RIGHT ILIOPSOAS BURSA/TENDON INJECTION AND RIGHT GLUTEAL TENDON INJECTION WITH STEROID AND LIDOCAINE;  Surgeon: Guillaume Rico MD;  Location: [facility];  Service: Pain Management;  Laterality: Right;  NEEDS CONSENT    INSERTION OF BREAST TISSUE EXPANDER Bilateral 10/29/2020    Procedure: INSERTION, TISSUE EXPANDER, BREAST;  Surgeon: Scottie Johnson MD;  Location: [facility] OR [address] [place] [address];  Service: Plastics;  Laterality: Bilateral;  Right breast: 1082 g  Left breast: 1076 g    LIPOSUCTION Bilateral 2/11/2021    Procedure: LIPOSUCTION;  Surgeon: Scottie Johnson MD;  Location: [facility] OR [address] [place] [address];  Service: Plastics;  Laterality: Bilateral;    mediastenoscopy      REPLACEMENT OF IMPLANT OF BREAST Bilateral 2/11/2021    Procedure: REPLACEMENT, IMPLANT, BREAST;  Surgeon: Scottie Johnson MD;  Location: [facility] OR [address] [place] [address];  Service: Plastics;  Laterality: Bilateral;    SENTINEL LYMPH NODE BIOPSY Right 10/29/2020    Procedure: BIOPSY, LYMPH NODE, SENTINEL;  Surgeon: Baylee Kevin MD;  Location: [address];  Service: General;  Laterality: Right;    TONSILLECTOMY N/A 1970    TUBAL LIGATION         Review of patient's allergies indicates:   Allergen Reactions    Lortab  [hydrocodone-acetaminophen] Itching    Promethazine Other (See Comments) and Itching     Other reaction(s): Itching       Medications:  Medications Prior to Admission   Medication Sig    anastrozole (ARIMIDEX) 1 mg Tab Take 1 tablet (1 mg total) by mouth once daily.    ARIPiprazole (ABILIFY) 5 MG Tab Take 5 mg by mouth once daily.    atorvastatin (LIPITOR) 10 MG tablet Take 10 mg by mouth once daily.    ATROVENT HFA 17 mcg/actuation inhaler Inhale 2 puffs into the lungs every 6 (six) hours as needed for Wheezing.    DULoxetine (CYMBALTA) 20 MG capsule Take 60 mg by mouth once daily.    gabapentin (NEURONTIN) 600 MG tablet Take 1 tablet (600 mg total) by  mouth 2 (two) times daily.    hydroCHLOROthiazide (HYDRODIURIL) 25 MG tablet Take 25 mg by mouth once daily.    hydrOXYzine pamoate (VISTARIL) 50 MG Cap Take 25 mg by mouth every 8 (eight) hours as needed.    levothyroxine (SYNTHROID) 50 MCG tablet Take 1 tablet (50 mcg total) by mouth before breakfast.    lisinopriL (PRINIVIL,ZESTRIL) 20 MG tablet Take 1 tablet (20 mg total) by mouth once daily.    metFORMIN (GLUCOPHAGE-XR) 500 MG ER 24hr tablet Take 2 tablets (1,000 mg total) by mouth 2 (two) times daily with meals.    methocarbamoL (ROBAXIN) 750 MG Tab Take 750 mg by mouth 3 (three) times daily.    metoprolol succinate (TOPROL-XL) 50 MG 24 hr tablet Take 50 mg by mouth once daily.    omega-3 fatty acids 1,000 mg Cap Take 1 capsule by mouth once daily.    ondansetron (ZOFRAN) 4 MG tablet Take 4 mg by mouth every 8 (eight) hours as needed.    ondansetron (ZOFRAN-ODT) 4 MG TbDL Zofran ODT Take PRN No date recorded tablet,disintegrating No frequency recorded No route recorded No set duration recorded No set duration amount recorded suspended 4 mg    pantoprazole (PROTONIX) 40 MG tablet Take 1 tablet (40 mg total) by mouth once daily.    semaglutide (OZEMPIC) 0.25 mg or 0.5 mg(2 mg/1.5 mL) pen injector Inject 0.5 mg into the skin every 7 days. Start with 0.25 mg weekly for 4 weeks and then increase to 0.5 mg if you are tolerating this dose    temazepam (RESTORIL) 15 mg Cap Take 1 capsule (15 mg total) by mouth nightly as needed (insomnia).    tiotropium-olodateroL (STIOLTO RESPIMAT) 2.5-2.5 mcg/actuation Mist Inhale 1 puff into the lungs 2 (two) times a day. Controller    traZODone (DESYREL) 100 MG tablet Take 1 tablet (100 mg total) by mouth nightly as needed for Insomnia. (Patient taking differently: Take 300 mg by mouth nightly as needed for Insomnia.)    baclofen (LIORESAL) 500 mcg/mL injection baclofen Take PRN No date recorded No form recorded No frequency recorded No route recorded No set duration recorded No  set duration amount recorded suspended No dosage strength recorded No dosage strength units of measure recorded    cephALEXin (KEFLEX) 500 MG capsule 1 capsule.    chlorzoxazone (PARAFON FORTE) 500 mg Tab Take 500 mg by mouth 2 (two) times daily as needed.    cloNIDine (CATAPRES) 0.1 MG tablet Take 0.1 mg by mouth 4 (four) times daily.    DEPLIN, ALGAL OIL, 15-90.314 mg Cap Take 1 capsule by mouth every morning.    disulfiram (ANTABUSE) 250 mg tablet Take by mouth.    FLUoxetine 60 mg Tab Take 60 mg by mouth once daily.     rOPINIRole (REQUIP) 0.25 MG tablet Take 0.25 mg by mouth every evening.     Antibiotics (From admission, onward)                Start     Stop Route Frequency Ordered    22 1315  cefTRIAXone (ROCEPHIN) 1 g/50 mL D5W IVPB         -- IV Every 24 hours (non-standard times) 22 1204          Antifungals (From admission, onward)                None          Antivirals (From admission, onward)      None             Immunization History   Administered Date(s) Administered    COVID-19, MRNA, LN-S, PF (Pfizer) (Gray Cap) 2022    COVID-19, MRNA, LN-S, PF (Pfizer) (Purple Cap) 2021, 2021    Influenza - Quadrivalent - PF *Preferred* (6 months and older) 10/27/2019    Pneumococcal Conjugate - 13 Valent 10/27/2019       Family History       Problem Relation (Age of Onset)    Breast cancer Maternal Aunt, Daughter    Colon cancer Maternal Uncle    Diabetes Father, Mother    Heart attack Father    Hypertension Father, Mother          Social History     Socioeconomic History    Marital status:    Tobacco Use    Smoking status: Current Every Day Smoker     Packs/day: 0.50     Years: 30.00     Pack years: 15.00     Types: Cigarettes     Last attempt to quit: 2021     Years since quittin.2    Smokeless tobacco: Never Used   Substance and Sexual Activity    Alcohol use: Yes     Comment: daily     Drug use: Yes     Types: Marijuana     Comment: currently    Sexual activity:  Yes     Birth control/protection: Surgical     Review of Systems  Objective:     Vital Signs (Most Recent):  Temp: 96.9 °F (36.1 °C) (05/04/22 0721)  Pulse: 86 (05/04/22 0721)  Resp: 16 (05/04/22 0721)  BP: (!) 143/63 (05/04/22 0721)  SpO2: 96 % (05/04/22 0721)   Vital Signs (24h Range):  Temp:  [96.9 °F (36.1 °C)-98.8 °F (37.1 °C)] 96.9 °F (36.1 °C)  Pulse:  [] 86  Resp:  [14-20] 16  SpO2:  [92 %-100 %] 96 %  BP: (126-166)/(60-70) 143/63     Weight: 96 kg (211 lb 10.3 oz)  Body mass index is 34.16 kg/m².    Estimated Creatinine Clearance: 84.1 mL/min (based on SCr of 0.8 mg/dL).    Physical Exam  Vitals and nursing note reviewed.   Constitutional:       General: She is not in acute distress.     Appearance: Normal appearance. She is not ill-appearing, toxic-appearing or diaphoretic.   HENT:      Head: Normocephalic and atraumatic.   Eyes:      Extraocular Movements: Extraocular movements intact.      Conjunctiva/sclera: Conjunctivae normal.      Pupils: Pupils are equal, round, and reactive to light.   Cardiovascular:      Rate and Rhythm: Normal rate and regular rhythm.   Abdominal:      Tenderness: There is right CVA tenderness.   Neurological:      General: No focal deficit present.      Mental Status: She is alert and oriented to person, place, and time. Mental status is at baseline.   Psychiatric:         Mood and Affect: Mood normal.         Behavior: Behavior normal.         Thought Content: Thought content normal.         Judgment: Judgment normal.       Significant Labs: Blood Culture:   Recent Labs   Lab 04/29/22  1917 04/29/22  1954 04/30/22  1746 05/01/22  1208 05/02/22  1751   LABBLOO Gram stain betzy bottle: Gram negative rods   Results called to and read back by:Chino Carney RN 04/30/2022  11:55  Gram stain aer bottle: Gram negative rods 04/30/2022  13:37    Positive results previously called  ESCHERICHIA COLI* No Growth to date  No Growth to date  No Growth to date  No Growth to date   No Growth to date Gram stain aer bottle: Gram negative rods  Results called to and read back by:Luci Bunn RN 05/01/2022  11:07  ESCHERICHIA COLI* Gram stain aer bottle: Gram negative rods   Results called to and read back by: Luci Andrew  05/02/2022  15:09  ESCHERICHIA COLI  For susceptibility see order #[id number]  * No Growth to date  No Growth to date     CBC: No results for input(s): WBC, HGB, HCT, PLT in the last 48 hours.  CMP: No results for input(s): NA, K, CL, CO2, GLU, BUN, CREATININE, CALCIUM, PROT, ALBUMIN, BILITOT, ALKPHOS, AST, ALT, ANIONGAP, EGFRNONAA in the last 48 hours.    Invalid input(s): ESTGFAFRICA  Urine Culture:   Recent Labs   Lab 04/29/22  1047   LABURIN ESCHERICHIA COLI  >100,000 cfu/ml  *       Significant Imaging: I have reviewed all pertinent imaging results/findings within the past 24 hours.

## 2022-05-04 NOTE — PLAN OF CARE
LMSW received call from [facility], PT has been denied due to not meeting criteria for HH services. LMSW sent referral to [facility].

## 2022-05-04 NOTE — HPI
"60 y/o woman admitted with ketoacidosis. Fever in hospital (overnight on 4/29/2022 and 4/30). Blood and urine cultures on 4/29/2022 with 2/4 bottles of E coli. CT showed right perinephric/periureteral stranding with mild ureteral wall thickening and abnormal enhancement of the right kidney in keeping with pyelonephritis. Started on IV Zosyn on 4/30/2022 and de-escalated to CTX. The patient is feeling "much better." She has LBP but denies fever x 24h. ID is consulted for urosepsis/bacteremia.   "

## 2022-05-04 NOTE — SUBJECTIVE & OBJECTIVE
Interval History: No acute events.    Review of Systems   Constitutional:  Negative for chills and fever.   Respiratory:  Negative for shortness of breath.    Cardiovascular:  Negative for chest pain.   Gastrointestinal:  Negative for abdominal pain, nausea and vomiting.   Genitourinary:  Negative for dysuria and frequency.   Objective:     Vital Signs (Most Recent):  Temp: 98.8 °F (37.1 °C) (05/03/22 1949)  Pulse: 71 (05/03/22 1949)  Resp: 18 (05/03/22 2010)  BP: (!) 166/70 (05/03/22 1949)  SpO2: 98 % (05/03/22 1949)   Vital Signs (24h Range):  Temp:  [97.6 °F (36.4 °C)-98.8 °F (37.1 °C)] 98.8 °F (37.1 °C)  Pulse:  [] 71  Resp:  [14-20] 18  SpO2:  [92 %-100 %] 98 %  BP: (126-166)/(62-77) 166/70     Weight: 96 kg (211 lb 10.3 oz)  Body mass index is 34.16 kg/m².    Intake/Output Summary (Last 24 hours) at 5/3/2022 2139  Last data filed at 5/3/2022 1733  Gross per 24 hour   Intake 1657.52 ml   Output --   Net 1657.52 ml      Physical Exam  Constitutional:       General: She is not in acute distress.  HENT:      Head: Atraumatic.   Eyes:      Conjunctiva/sclera: Conjunctivae normal.   Cardiovascular:      Rate and Rhythm: Normal rate and regular rhythm.      Heart sounds: Normal heart sounds. No murmur heard.  Pulmonary:      Effort: Pulmonary effort is normal.      Breath sounds: Normal breath sounds. No wheezing.   Abdominal:      General: Bowel sounds are normal. There is no distension.      Palpations: Abdomen is soft.      Tenderness: There is no abdominal tenderness.   Musculoskeletal:         General: No deformity. Normal range of motion.      Cervical back: Neck supple.   Neurological:      Mental Status: She is alert and oriented to person, place, and time.       Significant Labs: All pertinent labs within the past 24 hours have been reviewed.    Significant Imaging: I have reviewed all pertinent imaging results/findings within the past 24 hours.

## 2022-05-04 NOTE — PLAN OF CARE
Problem: Adult Inpatient Plan of Care  Goal: Plan of Care Review  Outcome: Ongoing, Progressing  Goal: Patient-Specific Goal (Individualized)  Outcome: Ongoing, Progressing  Goal: Absence of Hospital-Acquired Illness or Injury  Outcome: Ongoing, Progressing  Goal: Optimal Comfort and Wellbeing  Outcome: Ongoing, Progressing  Goal: Readiness for Transition of Care  Outcome: Ongoing, Progressing     Problem: Diabetes Comorbidity  Goal: Blood Glucose Level Within Targeted Range  Outcome: Ongoing, Progressing     Problem: Infection  Goal: Absence of Infection Signs and Symptoms  Outcome: Ongoing, Progressing     Problem: Impaired Wound Healing  Goal: Optimal Wound Healing  Outcome: Ongoing, Progressing     Problem: Skin Injury Risk Increased  Goal: Skin Health and Integrity  Outcome: Ongoing, Progressing     Problem: Pain Acute  Goal: Acceptable Pain Control and Functional Ability  Outcome: Ongoing, Progressing     Problem: Gas Exchange Impaired  Goal: Optimal Gas Exchange  Outcome: Ongoing, Progressing

## 2022-05-04 NOTE — PLAN OF CARE
[facility] ([facility])  HOME HEALTH ORDERS  FACE TO FACE ENCOUNTER    Patient Name: Earl Abdul  YOB: 1960  PCP: Andrew Rodriguez MD   [address] [place] [address] / [facility] [address]  PCP Phone Number: [phone]  PCP Fax: [phone]    Encounter Date: 4/29/22    Admit to Home Health    Diagnoses:  Active Hospital Problems    Diagnosis  POA    *Pyelonephritis due to Escherichia coli [N12, B96.20]  No     Priority: 1 - High    Alcohol withdrawal [F10.239]  Yes     Priority: 3     Essential hypertension [I10]  Yes     Priority: 6     E coli bacteremia [R78.81, B96.20]  Yes    Alcoholic ketoacidosis [E87.2]  Yes    Obesity (BMI 30.0-34.9) [E66.9]  Yes    Hypothyroidism [E03.9]  Yes    Type 2 diabetes mellitus with hyperglycemia [E11.65]  Yes    Anemia [D64.9]  Yes    COPD (chronic obstructive pulmonary disease) [J44.9]  Yes    Malignant neoplasm of central portion of right breast in female, estrogen receptor positive [C50.111, Z17.0]  Not Applicable    Depression with anxiety [F41.8]  Yes    Diarrhea [R19.7]  Yes    Hyperlipidemia [E78.5]  Yes     Overview:   ICD-10 Transition        Resolved Hospital Problems   No resolved problems to display.       Follow Up Appointments:  Future Appointments   Date Time Provider Department Center   5/6/2022  9:40 AM Juaquin Edwards MD [facility] UROLOGY [facility]   5/23/2022 10:20 AM Tyson Goodrich OD [facility] OPTOMTY [facility]   6/30/2022 10:30 AM Nusrat Prabhakar NP [facility] TNSYHO [facility] [place]       Allergies:  Review of patient's allergies indicates:   Allergen Reactions    Lortab  [hydrocodone-acetaminophen] Itching    Promethazine Other (See Comments) and Itching     Other reaction(s): Itching       Medications: Review discharge medications with patient and family and provide education.    Current Discharge Medication List      START taking these medications    Details   ciprofloxacin HCl (CIPRO) 500 MG tablet Take  1 tablet (500 mg total) by mouth every 12 (twelve) hours. for 10 days  Qty: 20 tablet, Refills: 0      nicotine (NICODERM CQ) 21 mg/24 hr Place 1 patch onto the skin once daily.  Qty: 30 patch, Refills: 0         CONTINUE these medications which have NOT CHANGED    Details   anastrozole (ARIMIDEX) 1 mg Tab Take 1 tablet (1 mg total) by mouth once daily.  Qty: 30 tablet, Refills: 11    Associated Diagnoses: Malignant neoplasm of central portion of right breast in female, estrogen receptor positive      ARIPiprazole (ABILIFY) 10 MG Tab Take 10 mg by mouth once daily.      atorvastatin (LIPITOR) 10 MG tablet Take 10 mg by mouth once daily.      ATROVENT HFA 17 mcg/actuation inhaler Inhale 2 puffs into the lungs every 6 (six) hours as needed for Wheezing.  Qty: 12.9 g, Refills: 11      DULoxetine (CYMBALTA) 60 MG capsule Take 60 mg by mouth once daily.      gabapentin (NEURONTIN) 600 MG tablet Take 1 tablet (600 mg total) by mouth 2 (two) times daily.  Qty: 180 tablet, Refills: 3      levothyroxine (SYNTHROID) 50 MCG tablet Take 1 tablet (50 mcg total) by mouth before breakfast.  Qty: 90 tablet, Refills: 3      lisinopriL (PRINIVIL,ZESTRIL) 20 MG tablet Take 1 tablet (20 mg total) by mouth once daily.  Qty: 90 tablet, Refills: 1    Comments: .      metFORMIN (GLUCOPHAGE-XR) 500 MG ER 24hr tablet Take 2 tablets (1,000 mg total) by mouth 2 (two) times daily with meals.  Qty: 360 tablet, Refills: 3    Associated Diagnoses: Uncontrolled type 2 diabetes mellitus with hyperglycemia      metoprolol succinate (TOPROL-XL) 25 MG 24 hr tablet Take 25 mg by mouth once daily.      omega-3 fatty acids 1,000 mg Cap Take 1 capsule by mouth once daily.      pantoprazole (PROTONIX) 40 MG tablet Take 1 tablet (40 mg total) by mouth once daily.  Qty: 30 tablet, Refills: 0      semaglutide (OZEMPIC) 0.25 mg or 0.5 mg(2 mg/1.5 mL) pen injector Inject 0.5 mg into the skin every 7 days. Start with 0.25 mg weekly for 4 weeks and then increase  to 0.5 mg if you are tolerating this dose  Qty: 1.5 mL, Refills: 11    Associated Diagnoses: Uncontrolled type 2 diabetes mellitus with hyperglycemia      tiotropium-olodateroL (STIOLTO RESPIMAT) 2.5-2.5 mcg/actuation Mist Inhale 1 puff into the lungs 2 (two) times a day. Controller  Qty: 4 g, Refills: 11      traZODone (DESYREL) 300 MG tablet Take 300 mg by mouth nightly.         STOP taking these medications       hydroCHLOROthiazide (HYDRODIURIL) 25 MG tablet Comments:   Reason for Stopping:         hydrOXYzine pamoate (VISTARIL) 25 MG Cap Comments:   Reason for Stopping:         hydrOXYzine pamoate (VISTARIL) 50 MG Cap Comments:   Reason for Stopping:         methocarbamoL (ROBAXIN) 750 MG Tab Comments:   Reason for Stopping:         ondansetron (ZOFRAN) 4 MG tablet Comments:   Reason for Stopping:         ondansetron (ZOFRAN-ODT) 4 MG TbDL Comments:   Reason for Stopping:         temazepam (RESTORIL) 15 mg Cap Comments:   Reason for Stopping:         baclofen (LIORESAL) 500 mcg/mL injection Comments:   Reason for Stopping:         cephALEXin (KEFLEX) 500 MG capsule Comments:   Reason for Stopping:         chlorzoxazone (PARAFON FORTE) 500 mg Tab Comments:   Reason for Stopping:         cloNIDine (CATAPRES) 0.1 MG tablet Comments:   Reason for Stopping:         DEPLIN, ALGAL OIL, 15-90.314 mg Cap Comments:   Reason for Stopping:         disulfiram (ANTABUSE) 250 mg tablet Comments:   Reason for Stopping:         FLUoxetine 60 mg Tab Comments:   Reason for Stopping:         rOPINIRole (REQUIP) 0.25 MG tablet Comments:   Reason for Stopping:                 I have seen and examined this patient within the last 30 days. My clinical findings that support the need for the home health skilled services and home bound status are the following:no   Weakness/numbness causing balance and gait disturbance due to Infection and Weakness/Debility making it taxing to leave home.     Diet:   diabetic diet 2000  calorie    Referrals/ Consults  Physical Therapy to evaluate and treat. Evaluate for home safety and equipment needs; Establish/upgrade home exercise program. Perform / instruct on therapeutic exercises, gait training, transfer training, and Range of Motion.  Occupational Therapy to evaluate and treat. Evaluate home environment for safety and equipment needs. Perform/Instruct on transfers, ADL training, ROM, and therapeutic exercises.   to evaluate for community resources/long-range planning.  Aide to provide assistance with personal care, ADLs, and vital signs.    Activities:   activity as tolerated    Nursing:   Agency to admit patient within 24 hours of hospital discharge unless specified on physician order or at patient request    SN to complete comprehensive assessment including routine vital signs. Instruct on disease process and s/s of complications to report to MD. Review/verify medication list sent home with the patient at time of discharge  and instruct patient/caregiver as needed. Frequency may be adjusted depending on start of care date.     Skilled nurse to perform up to 3 visits PRN for symptoms related to diagnosis    Notify MD if SBP > 160 or < 90; DBP > 90 or < 50; HR > 120 or < 50; Temp > 101; O2 < 88%    Ok to schedule additional visits based on staff availability and patient request on consecutive days within the home health episode.    When multiple disciplines ordered:    Start of Care occurs on Sunday - Wednesday schedule remaining discipline evaluations as ordered on separate consecutive days following the start of care.    Thursday SOC -schedule subsequent evaluations Friday and Monday the following week.     Friday - Saturday SOC - schedule subsequent discipline evaluations on consecutive days starting Monday of the following week.    For all post-discharge communication and subsequent orders please contact patient's primary care physician.    Miscellaneous   Diabetic Care:    SN to perform and educate Diabetic management with blood glucose monitoring:, Fingerstick blood sugar AC and HS and Report CBG < 60 or > 350 to physician.    Home Health Aide:  Nursing Weekly, Physical Therapy Three times weekly, Occupational Therapy Three times weekly and Medical Social Work Weekly    I certify that this patient is confined to her home and needs intermittent skilled nursing care, physical therapy and occupational therapy.

## 2022-05-04 NOTE — CONSULTS
"[facility] - [facility])  Infectious Disease  Consult Note    Patient Name: Earl Abdul  MRN: [id number]  Admission Date: 4/29/2022  Hospital Length of Stay: 5 days  Attending Physician: Gallo Diaz MD  Primary Care Provider: Andrew Rodriguez MD     Isolation Status: No active isolations    Patient information was obtained from patient and ER records.      Inpatient consult to Infectious Diseases  Consult performed by: Scottie Pond MD  Consult ordered by: Gallo Diaz MD        Assessment/Plan:     E coli bacteremia  - due to pyelonephritis by exam and imaging  - pan-susceptible isolate  - improved  - ok to transition to Cipro 500 mg po BID x 10 days, at your discretion    Thank you for your consult. I will sign off. Please contact us if you have any additional questions.    Scottie Pond MD  Infectious Disease      Subjective:     Principal Problem: Pyelonephritis due to Escherichia coli    HPI: 62 y/o woman admitted with ketoacidosis. Fever in hospital (overnight on 4/29/2022 and 4/30). Blood and urine cultures on 4/29/2022 with 2/4 bottles of E coli. CT showed right perinephric/periureteral stranding with mild ureteral wall thickening and abnormal enhancement of the right kidney in keeping with pyelonephritis. Started on IV Zosyn on 4/30/2022 and de-escalated to CTX. The patient is feeling "much better." She has LBP but denies fever x 24h. ID is consulted for urosepsis/bacteremia.       Past Medical History:   Diagnosis Date    Anemia     Anemia     Controlled type 2 diabetes mellitus without complication, without long-term current use of insulin 11/30/2021    COPD (chronic obstructive pulmonary disease)     Depression     Diverticulitis     Fatty liver     GERD (gastroesophageal reflux disease)     Hyperlipidemia     Hypertension     Pancreatitis     Peptic ulcer disease     Polysubstance abuse     Posterior reversible encephalopathy syndrome     " Sarcoidosis of lung     Sarcoidosis of lung     over 30 yrs ago    Seizures     7/2017    Suicide attempt     Suicide ideation        Past Surgical History:   Procedure Laterality Date    APPENDECTOMY      BILATERAL MASTECTOMY Bilateral 10/29/2020    Procedure: MASTECTOMY, BILATERAL;  Surgeon: Baylee Kevin MD;  Location: [facility] OR [address] [place] [address];  Service: General;  Laterality: Bilateral;    BREAST REVISION SURGERY Bilateral 2/11/2021    Procedure: BREAST REVISION SURGERY;  Surgeon: Scottie Johnson MD;  Location: [facility] OR [address] [place] [address];  Service: Plastics;  Laterality: Bilateral;    COLONOSCOPY N/A 7/28/2017    Procedure: COLONOSCOPY;  Surgeon: Aaron Alvarado MD;  Location: [facility];  Service: Endoscopy;  Laterality: N/A;    ESOPHAGOGASTRODUODENOSCOPY  10/7/2016, 11/6/2014    2016 - gastritis, duodenitis, 2014 erosive gastritis    ESOPHAGOGASTRODUODENOSCOPY N/A 2/11/2020    Procedure: ESOPHAGOGASTRODUODENOSCOPY (EGD);  Surgeon: Fawn Garrido MD;  Location: [facility];  Service: Endoscopy;  Laterality: N/A;    ESOPHAGOGASTRODUODENOSCOPY N/A 4/19/2021    Procedure: EGD (ESOPHAGOGASTRODUODENOSCOPY);  Surgeon: Paramjit Martino MD;  Location: [facility];  Service: Endoscopy;  Laterality: N/A;    FLEXIBLE SIGMOIDOSCOPY  11/06/2014    colitis    HYSTERECTOMY      INJECTION FOR SENTINEL NODE IDENTIFICATION Right 10/29/2020    Procedure: INJECTION, FOR SENTINEL NODE IDENTIFICATION;  Surgeon: Baylee Kevin MD;  Location: [facility] OR [address] [place] [address];  Service: General;  Laterality: Right;    INJECTION OF JOINT Right 10/10/2019    Procedure: Injection, Joint RIGHT ILIOPSOAS BURSA/TENDON INJECTION AND RIGHT GLUTEAL TENDON INJECTION WITH STEROID AND LIDOCAINE;  Surgeon: Guillaume Rico MD;  Location: [facility] PAIN [facility]GT;  Service: Pain Management;  Laterality: Right;  NEEDS CONSENT    INSERTION OF BREAST TISSUE EXPANDER Bilateral 10/29/2020    Procedure: INSERTION, TISSUE EXPANDER, BREAST;  Surgeon: Scottie Johnson MD;   [facility];  Service: Plastics;  Laterality: Bilateral;  Right breast: 1082 g  Left breast: 1076 g    LIPOSUCTION Bilateral 2/11/2021    Procedure: LIPOSUCTION;  Surgeon: Scottie Johnson MD;  [facility];  Service: Plastics;  Laterality: Bilateral;    mediastenoscopy      REPLACEMENT OF IMPLANT OF BREAST Bilateral 2/11/2021    Procedure: REPLACEMENT, IMPLANT, BREAST;  Surgeon: Scottie Johnson MD;  [facility];  Service: Plastics;  Laterality: Bilateral;    SENTINEL LYMPH NODE BIOPSY Right 10/29/2020    Procedure: BIOPSY, LYMPH NODE, SENTINEL;  Surgeon: Baylee Kevin MD;  Location: [facility] OR [address] [place] [address];  Service: General;  Laterality: Right;    TONSILLECTOMY N/A 1970    TUBAL LIGATION         Review of patient's allergies indicates:   Allergen Reactions    Lortab  [hydrocodone-acetaminophen] Itching    Promethazine Other (See Comments) and Itching     Other reaction(s): Itching       Medications:  Medications Prior to Admission   Medication Sig    anastrozole (ARIMIDEX) 1 mg Tab Take 1 tablet (1 mg total) by mouth once daily.    ARIPiprazole (ABILIFY) 5 MG Tab Take 5 mg by mouth once daily.    atorvastatin (LIPITOR) 10 MG tablet Take 10 mg by mouth once daily.    ATROVENT HFA 17 mcg/actuation inhaler Inhale 2 puffs into the lungs every 6 (six) hours as needed for Wheezing.    DULoxetine (CYMBALTA) 20 MG capsule Take 60 mg by mouth once daily.    gabapentin (NEURONTIN) 600 MG tablet Take 1 tablet (600 mg total) by mouth 2 (two) times daily.    hydroCHLOROthiazide (HYDRODIURIL) 25 MG tablet Take 25 mg by mouth once daily.    hydrOXYzine pamoate (VISTARIL) 50 MG Cap Take 25 mg by mouth every 8 (eight) hours as needed.    levothyroxine (SYNTHROID) 50 MCG tablet Take 1 tablet (50 mcg total) by mouth before breakfast.    lisinopriL (PRINIVIL,ZESTRIL) 20 MG tablet Take 1 tablet (20 mg total) by mouth once daily.    metFORMIN (GLUCOPHAGE-XR) 500 MG ER  24hr tablet Take 2 tablets (1,000 mg total) by mouth 2 (two) times daily with meals.    methocarbamoL (ROBAXIN) 750 MG Tab Take 750 mg by mouth 3 (three) times daily.    metoprolol succinate (TOPROL-XL) 50 MG 24 hr tablet Take 50 mg by mouth once daily.    omega-3 fatty acids 1,000 mg Cap Take 1 capsule by mouth once daily.    ondansetron (ZOFRAN) 4 MG tablet Take 4 mg by mouth every 8 (eight) hours as needed.    ondansetron (ZOFRAN-ODT) 4 MG TbDL Zofran ODT Take PRN No date recorded tablet,disintegrating No frequency recorded No route recorded No set duration recorded No set duration amount recorded suspended 4 mg    pantoprazole (PROTONIX) 40 MG tablet Take 1 tablet (40 mg total) by mouth once daily.    semaglutide (OZEMPIC) 0.25 mg or 0.5 mg(2 mg/1.5 mL) pen injector Inject 0.5 mg into the skin every 7 days. Start with 0.25 mg weekly for 4 weeks and then increase to 0.5 mg if you are tolerating this dose    temazepam (RESTORIL) 15 mg Cap Take 1 capsule (15 mg total) by mouth nightly as needed (insomnia).    tiotropium-olodateroL (STIOLTO RESPIMAT) 2.5-2.5 mcg/actuation Mist Inhale 1 puff into the lungs 2 (two) times a day. Controller    traZODone (DESYREL) 100 MG tablet Take 1 tablet (100 mg total) by mouth nightly as needed for Insomnia. (Patient taking differently: Take 300 mg by mouth nightly as needed for Insomnia.)    baclofen (LIORESAL) 500 mcg/mL injection baclofen Take PRN No date recorded No form recorded No frequency recorded No route recorded No set duration recorded No set duration amount recorded suspended No dosage strength recorded No dosage strength units of measure recorded    cephALEXin (KEFLEX) 500 MG capsule 1 capsule.    chlorzoxazone (PARAFON FORTE) 500 mg Tab Take 500 mg by mouth 2 (two) times daily as needed.    cloNIDine (CATAPRES) 0.1 MG tablet Take 0.1 mg by mouth 4 (four) times daily.    DEPLIN, ALGAL OIL, 15-90.314 mg Cap Take 1 capsule by mouth every morning.     disulfiram (ANTABUSE) 250 mg tablet Take by mouth.    FLUoxetine 60 mg Tab Take 60 mg by mouth once daily.     rOPINIRole (REQUIP) 0.25 MG tablet Take 0.25 mg by mouth every evening.     Antibiotics (From admission, onward)                Start     Stop Route Frequency Ordered    22 1315  cefTRIAXone (ROCEPHIN) 1 g/50 mL D5W IVPB         -- IV Every 24 hours (non-standard times) 22 1204          Antifungals (From admission, onward)                None          Antivirals (From admission, onward)      None             Immunization History   Administered Date(s) Administered    COVID-19, MRNA, LN-S, PF (Pfizer) (Gray Cap) 2022    COVID-19, MRNA, LN-S, PF (Pfizer) (Purple Cap) 2021, 2021    Influenza - Quadrivalent - PF *Preferred* (6 months and older) 10/27/2019    Pneumococcal Conjugate - 13 Valent 10/27/2019       Family History       Problem Relation (Age of Onset)    Breast cancer Maternal Aunt, Daughter    Colon cancer Maternal Uncle    Diabetes Father, Mother    Heart attack Father    Hypertension Father, Mother          Social History     Socioeconomic History    Marital status:    Tobacco Use    Smoking status: Current Every Day Smoker     Packs/day: 0.50     Years: 30.00     Pack years: 15.00     Types: Cigarettes     Last attempt to quit: 2021     Years since quittin.2    Smokeless tobacco: Never Used   Substance and Sexual Activity    Alcohol use: Yes     Comment: daily     Drug use: Yes     Types: Marijuana     Comment: currently    Sexual activity: Yes     Birth control/protection: Surgical     Review of Systems  Objective:     Vital Signs (Most Recent):  Temp: 96.9 °F (36.1 °C) (22)  Pulse: 86 (22)  Resp: 16 (22)  BP: (!) 143/63 (22)  SpO2: 96 % (22)   Vital Signs (24h Range):  Temp:  [96.9 °F (36.1 °C)-98.8 °F (37.1 °C)] 96.9 °F (36.1 °C)  Pulse:  [] 86  Resp:  [14-20] 16  SpO2:  [92  %-100 %] 96 %  BP: (126-166)/(60-70) 143/63     Weight: 96 kg (211 lb 10.3 oz)  Body mass index is 34.16 kg/m².    Estimated Creatinine Clearance: 84.1 mL/min (based on SCr of 0.8 mg/dL).    Physical Exam  Vitals and nursing note reviewed.   Constitutional:       General: She is not in acute distress.     Appearance: Normal appearance. She is not ill-appearing, toxic-appearing or diaphoretic.   HENT:      Head: Normocephalic and atraumatic.   Eyes:      Extraocular Movements: Extraocular movements intact.      Conjunctiva/sclera: Conjunctivae normal.      Pupils: Pupils are equal, round, and reactive to light.   Cardiovascular:      Rate and Rhythm: Normal rate and regular rhythm.   Abdominal:      Tenderness: There is right CVA tenderness.   Neurological:      General: No focal deficit present.      Mental Status: She is alert and oriented to person, place, and time. Mental status is at baseline.   Psychiatric:         Mood and Affect: Mood normal.         Behavior: Behavior normal.         Thought Content: Thought content normal.         Judgment: Judgment normal.       Significant Labs: Blood Culture:   Recent Labs   Lab 04/29/22  1917 04/29/22  1954 04/30/22  1746 05/01/22  1208 05/02/22  1751   LABBLOO Gram stain betzy bottle: Gram negative rods   Results called to and read back by:Chino Carney RN 04/30/2022  11:55  Gram stain aer bottle: Gram negative rods 04/30/2022  13:37    Positive results previously called  ESCHERICHIA COLI* No Growth to date  No Growth to date  No Growth to date  No Growth to date  No Growth to date Gram stain aer bottle: Gram negative rods  Results called to and read back by:Luci Bunn RN 05/01/2022  11:07  ESCHERICHIA COLI* Gram stain aer bottle: Gram negative rods   Results called to and read back by: Luci Andrew  05/02/2022  15:09  ESCHERICHIA COLI  For susceptibility see order #[id number]  * No Growth to date  No Growth to date     CBC: No results for  input(s): WBC, HGB, HCT, PLT in the last 48 hours.  CMP: No results for input(s): NA, K, CL, CO2, GLU, BUN, CREATININE, CALCIUM, PROT, ALBUMIN, BILITOT, ALKPHOS, AST, ALT, ANIONGAP, EGFRNONAA in the last 48 hours.    Invalid input(s): ESTGFAFRICA  Urine Culture:   Recent Labs   Lab 04/29/22  1047   LABURIN ESCHERICHIA COLI  >100,000 cfu/ml  *       Significant Imaging: I have reviewed all pertinent imaging results/findings within the past 24 hours.

## 2022-05-05 ENCOUNTER — PATIENT OUTREACH (OUTPATIENT)
Dept: ADMINISTRATIVE | Facility: CLINIC | Age: 62
End: 2022-05-05
Payer: COMMERCIAL

## 2022-05-05 LAB — BACTERIA BLD CULT: NORMAL

## 2022-05-08 LAB — BACTERIA BLD CULT: NORMAL

## 2022-05-11 NOTE — DISCHARGE SUMMARY
"[facility] [place] - [facility] Medicine  Discharge Summary      Patient Name: Earl Abdul  MRN: [id number]  Patient Class: IP- Inpatient  Admission Date: 4/29/2022  Hospital Length of Stay: 5 days  Discharge Date and Time: 5/4/2022  5:16 PM  Attending Physician: Gallo Diaz MD  Discharging Provider: Gallo Diaz MD  Primary Care Provider: Andrew Rodriguez MD      HPI:   Per Dr. William. MAGALIE Hayden:    "Patient is a 61 year old female with a PMH significant for HTN, HLD, Depression (Suicide Attempts in past), Alcohol Abuse (drinks a half of a fifth of Whiskey per day) complicated with withdrawal seizures in past and pancreatitis , COPD (prn and qHS Oxygen of 2-3L),  HTN, HLD, Sarcoidosis of Lung (not active), Hepatic Steatosis, Hypothyroidism, PUD/Gastritis, Breast Cancer s/p double mastectomy and breast reconstruction who presented with N/V/D without abdominal pain for past week.  Patient states she started Restoril a week ago for insomnia, and since has developed N/V/D that she thought was due to the Restoril or withdrawing from Trazodone.  She restarted her Trazodone, but continued with these symptoms.  She denies abdominal pain.  No chest pain or SOB.  Patient last had a drink of alcohol yesterday, but was unable to keep most of it down.  No significant headache or change in vision.  No F/c.  No dysuria.  She was given IV NS x 1L in ED, as well as Zofran, Banana Bag, IV Thiamine, and Ativan 2MG IV.  CIWA-Ar at the time of my interview was 10.  Patient is being admitted for Alcohol Withdrawal and Ketoacidosis."    Hospital Course:   Patient is 58 year woman with history of hypertension, dyslipidemia, depression, alcohol abuse with recent relapse admitted for treatment of alcoholic ketoacidosis and alcohol withdrawal and also acute pyelonephritis with Escherichia coli bacteremia.  Patient treated intravenous antibiotics.  Metabolic derangements and alcohol withdrawal treated with supportive " care and as needed benzodiazepines.  Patient resolution of metabolic derangement secondary to alcohol abuse.  Evidence of pyelonephritis improved on intravenous antibiotics.  In light of repeated positive blood cultures for Escherichia coli, Infectious Disease service was consulted for evaluation and for treatment recommendations.  Most recent blood culture collected on 5/2/2022 negative to date.  Dr. Scottie Pond recommended transition patient to oral ciprofloxacin to complete an extended course of antibiotic treatment.  Patient counseled importance of seeing further alcohol use.  Close outpatient follow-up for ongoing management of her multiple medical comorbidities advised.       Goals of Care Treatment Preferences:  Code Status: Full Code      Consults:   Consults (From admission, onward)        Status Ordering Provider     Inpatient consult to Infectious Diseases  Once        Provider:  MD Yesika Anderson LUIS F.          Final Active Diagnoses:    Diagnosis Date Noted POA    PRINCIPAL PROBLEM:  Pyelonephritis due to Escherichia coli [N12, B96.20] 10/16/2014 No    Alcohol withdrawal [F10.239] 02/07/2014 Yes    Essential hypertension [I10] 02/07/2014 Yes    E coli bacteremia [R78.81, B96.20] 05/04/2022 Yes    Alcoholic ketoacidosis [E87.2] 04/29/2022 Yes    Obesity (BMI 30.0-34.9) [E66.9] 11/30/2021 Yes    Hypothyroidism [E03.9] 11/30/2021 Yes    Type 2 diabetes mellitus with hyperglycemia [E11.65] 11/30/2021 Yes    Anemia [D64.9] 11/30/2021 Yes    COPD (chronic obstructive pulmonary disease) [J44.9] 04/17/2021 Yes    Malignant neoplasm of central portion of right breast in female, estrogen receptor positive [C50.111, Z17.0] 11/18/2020 Not Applicable    Depression with anxiety [F41.8] 08/16/2017 Yes    Diarrhea [R19.7] 10/15/2014 Yes    Hyperlipidemia [E78.5] 11/30/2012 Yes      Problems Resolved During this Admission:       Discharged Condition:  "Stable    Disposition: Home or Self Care    Follow Up:   Follow-up Information     Andrew Rodriguez MD. Schedule an appointment as soon as possible for a visit in 2 week(s).    Specialty: Internal Medicine  Why: Management of medical co-morbidities  Contact information:  [address]  [address]  [facility] [address]  [phone]                       Patient Instructions:      BATH/SHOWER CHAIR FOR HOME USE     Order Specific Question Answer Comments   Height: 5' 6" (1.676 m)    Weight: 96 kg (211 lb 10.3 oz)    Does patient have medical equipment at home? none    Length of need (1-99 months): 99    Type: Without back      Diet diabetic     Notify your health care provider if you experience any of the following:  temperature >100.4     Notify your health care provider if you experience any of the following:  persistent nausea and vomiting or diarrhea     Notify your health care provider if you experience any of the following:  severe uncontrolled pain     Notify your health care provider if you experience any of the following:  difficulty breathing or increased cough     Notify your health care provider if you experience any of the following:  severe persistent headache     Notify your health care provider if you experience any of the following:  worsening rash     Notify your health care provider if you experience any of the following:  persistent dizziness, light-headedness, or visual disturbances     Notify your health care provider if you experience any of the following:  increased confusion or weakness     Activity as tolerated        Medications:  Reconciled Home Medications:      Medication List      START taking these medications    ciprofloxacin HCl 500 MG tablet  Commonly known as: CIPRO  Take 1 tablet (500 mg total) by mouth every 12 (twelve) hours. for 10 days     nicotine 21 mg/24 hr  Commonly known as: NICODERM CQ  Place 1 patch onto the skin once daily.        CHANGE how you take these " medications    ARIPiprazole 10 MG Tab  Commonly known as: ABILIFY  Take 10 mg by mouth once daily.  What changed: Another medication with the same name was removed. Continue taking this medication, and follow the directions you see here.     DULoxetine 60 MG capsule  Commonly known as: CYMBALTA  Take 60 mg by mouth once daily.  What changed: Another medication with the same name was removed. Continue taking this medication, and follow the directions you see here.     metoprolol succinate 25 MG 24 hr tablet  Commonly known as: TOPROL-XL  Take 25 mg by mouth once daily.  What changed: Another medication with the same name was removed. Continue taking this medication, and follow the directions you see here.     traZODone 300 MG tablet  Commonly known as: DESYREL  Take 300 mg by mouth nightly.  What changed: Another medication with the same name was removed. Continue taking this medication, and follow the directions you see here.        CONTINUE taking these medications    anastrozole 1 mg Tab  Commonly known as: ARIMIDEX  Take 1 tablet (1 mg total) by mouth once daily.     atorvastatin 10 MG tablet  Commonly known as: LIPITOR  Take 10 mg by mouth once daily.     ATROVENT HFA 17 mcg/actuation inhaler  Generic drug: ipratropium  Inhale 2 puffs into the lungs every 6 (six) hours as needed for Wheezing.     gabapentin 600 MG tablet  Commonly known as: NEURONTIN  Take 1 tablet (600 mg total) by mouth 2 (two) times daily.     levothyroxine 50 MCG tablet  Commonly known as: SYNTHROID  Take 1 tablet (50 mcg total) by mouth before breakfast.     lisinopriL 20 MG tablet  Commonly known as: PRINIVIL,ZESTRIL  Take 1 tablet (20 mg total) by mouth once daily.     metFORMIN 500 MG ER 24hr tablet  Commonly known as: GLUCOPHAGE-XR  Take 2 tablets (1,000 mg total) by mouth 2 (two) times daily with meals.     omega-3 fatty acids 1,000 mg Cap  Take 1 capsule by mouth once daily.     OZEMPIC 0.25 mg or 0.5 mg(2 mg/1.5 mL) pen  injector  Generic drug: semaglutide  Inject 0.5 mg into the skin every 7 days. Start with 0.25 mg weekly for 4 weeks and then increase to 0.5 mg if you are tolerating this dose     pantoprazole 40 MG tablet  Commonly known as: PROTONIX  Take 1 tablet (40 mg total) by mouth once daily.     STIOLTO RESPIMAT 2.5-2.5 mcg/actuation Mist  Generic drug: tiotropium-olodateroL  Inhale 1 puff into the lungs 2 (two) times a day. Controller        STOP taking these medications    baclofen 500 mcg/mL injection  Commonly known as: LIORESAL     cephALEXin 500 MG capsule  Commonly known as: KEFLEX     chlorzoxazone 500 mg Tab  Commonly known as: PARAFON FORTE     cloNIDine 0.1 MG tablet  Commonly known as: CATAPRES     DEPLIN (ALGAL OIL) 15-90.314 mg Cap  Generic drug: levomefolate-algal oil     disulfiram 250 mg tablet  Commonly known as: ANTABUSE     FLUoxetine 60 mg Tab     hydroCHLOROthiazide 25 MG tablet  Commonly known as: HYDRODIURIL     hydrOXYzine pamoate 25 MG Cap  Commonly known as: VISTARIL     hydrOXYzine pamoate 50 MG Cap  Commonly known as: VISTARIL     methocarbamoL 750 MG Tab  Commonly known as: ROBAXIN     ondansetron 4 MG tablet  Commonly known as: ZOFRAN     ondansetron 4 MG Tbdl  Commonly known as: ZOFRAN-ODT     rOPINIRole 0.25 MG tablet  Commonly known as: REQUIP     temazepam 15 mg Cap  Commonly known as: RESTORIL            Indwelling Lines/Drains at time of discharge:   Lines/Drains/Airways     None                 Time spent on the discharge of patient: 35 minutes         Gallo Diaz MD  Department of Hospital Medicine  [facility]

## 2022-05-12 ENCOUNTER — PATIENT MESSAGE (OUTPATIENT)
Dept: SMOKING CESSATION | Facility: CLINIC | Age: 62
End: 2022-05-12
Payer: COMMERCIAL

## 2022-05-13 ENCOUNTER — PATIENT MESSAGE (OUTPATIENT)
Dept: UROLOGY | Facility: CLINIC | Age: 62
End: 2022-05-13
Payer: COMMERCIAL

## 2022-05-19 ENCOUNTER — PATIENT OUTREACH (OUTPATIENT)
Dept: ADMINISTRATIVE | Facility: OTHER | Age: 62
End: 2022-05-19
Payer: COMMERCIAL

## 2022-05-20 ENCOUNTER — PATIENT MESSAGE (OUTPATIENT)
Dept: INTERNAL MEDICINE | Facility: CLINIC | Age: 62
End: 2022-05-20
Payer: COMMERCIAL

## 2022-05-20 NOTE — PROGRESS NOTES
Requested updates within Care Everywhere.  Patient's chart was reviewed for overdue TIMOTHY topics.  Health maintenance:updated  Immunizations:reconciled   Legacy:   Media:  Orders placed:  Tasked appts:  Labs Linked:  Upcoming appt:Optometry 5/23/22

## 2022-05-23 ENCOUNTER — OFFICE VISIT (OUTPATIENT)
Dept: OPTOMETRY | Facility: CLINIC | Age: 62
End: 2022-05-23
Payer: COMMERCIAL

## 2022-05-23 DIAGNOSIS — H25.13 NUCLEAR SCLEROSIS, BILATERAL: ICD-10-CM

## 2022-05-23 DIAGNOSIS — Z13.5 GLAUCOMA SCREENING: ICD-10-CM

## 2022-05-23 DIAGNOSIS — E11.9 CONTROLLED TYPE 2 DIABETES MELLITUS WITHOUT COMPLICATION, WITHOUT LONG-TERM CURRENT USE OF INSULIN: Primary | ICD-10-CM

## 2022-05-23 PROCEDURE — 92004 PR EYE EXAM, NEW PATIENT,COMPREHESV: ICD-10-PCS | Mod: S$GLB,,, | Performed by: OPTOMETRIST

## 2022-05-23 PROCEDURE — 99999 PR PBB SHADOW E&M-EST. PATIENT-LVL III: CPT | Mod: PBBFAC,,, | Performed by: OPTOMETRIST

## 2022-05-23 PROCEDURE — 92004 COMPRE OPH EXAM NEW PT 1/>: CPT | Mod: S$GLB,,, | Performed by: OPTOMETRIST

## 2022-05-23 PROCEDURE — 99999 PR PBB SHADOW E&M-EST. PATIENT-LVL III: ICD-10-PCS | Mod: PBBFAC,,, | Performed by: OPTOMETRIST

## 2022-05-23 NOTE — PROGRESS NOTES
HPI     Annual Diabetic Eye Exam   Recent refractive exam with good VA  SELENA 2022  Hemoglobin A1C       Date                     Value               Ref Range             Status                03/10/2022               6.6 (H)             4.0 - 5.6 %           Final          .         01/05/2021               5.6                 4.7 - 5.6 %           Final                 06/22/2017               4.4                 4.0 - 5.6 %           Final              Last edited by Tyson Goodrich, OD on 5/23/2022 10:48 AM. (History)            Assessment /Plan     For exam results, see Encounter Report.    Controlled type 2 diabetes mellitus without complication, without long-term current use of insulin  -     Ambulatory referral/consult to Optometry  -No retinopathy noted today.  Continued control with primary care physician and annual comprehensive eye exam.    Nuclear sclerosis, bilateral  -Educated patient on presence of cataracts at today's exam, monitor at annual dilated fundus exam. 8+ years surgical estimate.    Glaucoma screening  -Monitor with annual eye exam and IOP check      RTC 1 yr

## 2022-05-24 ENCOUNTER — PATIENT MESSAGE (OUTPATIENT)
Dept: UROLOGY | Facility: CLINIC | Age: 62
End: 2022-05-24
Payer: COMMERCIAL

## 2022-05-30 ENCOUNTER — PATIENT MESSAGE (OUTPATIENT)
Dept: INTERNAL MEDICINE | Facility: CLINIC | Age: 62
End: 2022-05-30
Payer: COMMERCIAL

## 2022-06-02 ENCOUNTER — DOCUMENTATION ONLY (OUTPATIENT)
Dept: UROLOGY | Facility: CLINIC | Age: 62
End: 2022-06-02
Payer: COMMERCIAL

## 2022-06-02 ENCOUNTER — OFFICE VISIT (OUTPATIENT)
Dept: INTERNAL MEDICINE | Facility: CLINIC | Age: 62
End: 2022-06-02
Attending: INTERNAL MEDICINE
Payer: COMMERCIAL

## 2022-06-02 ENCOUNTER — PATIENT MESSAGE (OUTPATIENT)
Dept: UROLOGY | Facility: CLINIC | Age: 62
End: 2022-06-02
Payer: COMMERCIAL

## 2022-06-02 VITALS
SYSTOLIC BLOOD PRESSURE: 130 MMHG | HEART RATE: 105 BPM | WEIGHT: 201.94 LBS | HEIGHT: 66 IN | OXYGEN SATURATION: 93 % | DIASTOLIC BLOOD PRESSURE: 72 MMHG | BODY MASS INDEX: 32.45 KG/M2

## 2022-06-02 DIAGNOSIS — G43.011 INTRACTABLE MIGRAINE WITHOUT AURA AND WITH STATUS MIGRAINOSUS: Primary | ICD-10-CM

## 2022-06-02 PROCEDURE — 99214 PR OFFICE/OUTPT VISIT, EST, LEVL IV, 30-39 MIN: ICD-10-PCS | Mod: 25,S$GLB,, | Performed by: INTERNAL MEDICINE

## 2022-06-02 PROCEDURE — 1160F RVW MEDS BY RX/DR IN RCRD: CPT | Mod: CPTII,S$GLB,, | Performed by: INTERNAL MEDICINE

## 2022-06-02 PROCEDURE — 96372 PR INJECTION,THERAP/PROPH/DIAG2ST, IM OR SUBCUT: ICD-10-PCS | Mod: S$GLB,,, | Performed by: INTERNAL MEDICINE

## 2022-06-02 PROCEDURE — 3075F SYST BP GE 130 - 139MM HG: CPT | Mod: CPTII,S$GLB,, | Performed by: INTERNAL MEDICINE

## 2022-06-02 PROCEDURE — 3008F BODY MASS INDEX DOCD: CPT | Mod: CPTII,S$GLB,, | Performed by: INTERNAL MEDICINE

## 2022-06-02 PROCEDURE — 3078F PR MOST RECENT DIASTOLIC BLOOD PRESSURE < 80 MM HG: ICD-10-PCS | Mod: CPTII,S$GLB,, | Performed by: INTERNAL MEDICINE

## 2022-06-02 PROCEDURE — 4010F PR ACE/ARB THEARPY RXD/TAKEN: ICD-10-PCS | Mod: CPTII,S$GLB,, | Performed by: INTERNAL MEDICINE

## 2022-06-02 PROCEDURE — 1159F MED LIST DOCD IN RCRD: CPT | Mod: CPTII,S$GLB,, | Performed by: INTERNAL MEDICINE

## 2022-06-02 PROCEDURE — 1111F DSCHRG MED/CURRENT MED MERGE: CPT | Mod: CPTII,S$GLB,, | Performed by: INTERNAL MEDICINE

## 2022-06-02 PROCEDURE — 99999 PR PBB SHADOW E&M-EST. PATIENT-LVL IV: CPT | Mod: PBBFAC,,, | Performed by: INTERNAL MEDICINE

## 2022-06-02 PROCEDURE — 99999 PR PBB SHADOW E&M-EST. PATIENT-LVL IV: ICD-10-PCS | Mod: PBBFAC,,, | Performed by: INTERNAL MEDICINE

## 2022-06-02 PROCEDURE — 1159F PR MEDICATION LIST DOCUMENTED IN MEDICAL RECORD: ICD-10-PCS | Mod: CPTII,S$GLB,, | Performed by: INTERNAL MEDICINE

## 2022-06-02 PROCEDURE — 3061F PR NEG MICROALBUMINURIA RESULT DOCUMENTED/REVIEW: ICD-10-PCS | Mod: CPTII,S$GLB,, | Performed by: INTERNAL MEDICINE

## 2022-06-02 PROCEDURE — 96372 THER/PROPH/DIAG INJ SC/IM: CPT | Mod: S$GLB,,, | Performed by: INTERNAL MEDICINE

## 2022-06-02 PROCEDURE — 3075F PR MOST RECENT SYSTOLIC BLOOD PRESS GE 130-139MM HG: ICD-10-PCS | Mod: CPTII,S$GLB,, | Performed by: INTERNAL MEDICINE

## 2022-06-02 PROCEDURE — 3044F HG A1C LEVEL LT 7.0%: CPT | Mod: CPTII,S$GLB,, | Performed by: INTERNAL MEDICINE

## 2022-06-02 PROCEDURE — 3066F PR DOCUMENTATION OF TREATMENT FOR NEPHROPATHY: ICD-10-PCS | Mod: CPTII,S$GLB,, | Performed by: INTERNAL MEDICINE

## 2022-06-02 PROCEDURE — 4010F ACE/ARB THERAPY RXD/TAKEN: CPT | Mod: CPTII,S$GLB,, | Performed by: INTERNAL MEDICINE

## 2022-06-02 PROCEDURE — 1111F PR DISCHARGE MEDS RECONCILED W/ CURRENT OUTPATIENT MED LIST: ICD-10-PCS | Mod: CPTII,S$GLB,, | Performed by: INTERNAL MEDICINE

## 2022-06-02 PROCEDURE — 3061F NEG MICROALBUMINURIA REV: CPT | Mod: CPTII,S$GLB,, | Performed by: INTERNAL MEDICINE

## 2022-06-02 PROCEDURE — 3066F NEPHROPATHY DOC TX: CPT | Mod: CPTII,S$GLB,, | Performed by: INTERNAL MEDICINE

## 2022-06-02 PROCEDURE — 99214 OFFICE O/P EST MOD 30 MIN: CPT | Mod: 25,S$GLB,, | Performed by: INTERNAL MEDICINE

## 2022-06-02 PROCEDURE — 3044F PR MOST RECENT HEMOGLOBIN A1C LEVEL <7.0%: ICD-10-PCS | Mod: CPTII,S$GLB,, | Performed by: INTERNAL MEDICINE

## 2022-06-02 PROCEDURE — 3078F DIAST BP <80 MM HG: CPT | Mod: CPTII,S$GLB,, | Performed by: INTERNAL MEDICINE

## 2022-06-02 PROCEDURE — 3008F PR BODY MASS INDEX (BMI) DOCUMENTED: ICD-10-PCS | Mod: CPTII,S$GLB,, | Performed by: INTERNAL MEDICINE

## 2022-06-02 PROCEDURE — 1160F PR REVIEW ALL MEDS BY PRESCRIBER/CLIN PHARMACIST DOCUMENTED: ICD-10-PCS | Mod: CPTII,S$GLB,, | Performed by: INTERNAL MEDICINE

## 2022-06-02 RX ORDER — SUMATRIPTAN SUCCINATE 100 MG/1
TABLET ORAL
Qty: 30 TABLET | Refills: 1 | Status: SHIPPED | OUTPATIENT
Start: 2022-06-02 | End: 2022-06-29 | Stop reason: CLARIF

## 2022-06-02 RX ORDER — TRIAMCINOLONE ACETONIDE 40 MG/ML
40 INJECTION, SUSPENSION INTRA-ARTICULAR; INTRAMUSCULAR
Status: COMPLETED | OUTPATIENT
Start: 2022-06-02 | End: 2022-06-02

## 2022-06-02 RX ADMIN — TRIAMCINOLONE ACETONIDE 40 MG: 40 INJECTION, SUSPENSION INTRA-ARTICULAR; INTRAMUSCULAR at 01:06

## 2022-06-02 NOTE — PROGRESS NOTES
"Subjective:       Patient ID: Earl Abdul is a 62 y.o. female.    Chief Complaint: Migraine    Here for urgent visit      61 yo F with PMHx of HTN, HLD, Breast CA, DM, COPD (night oxygen of 3L), MDD (hx SA), Etoh abuse (withdrawal seizures in past and pancreatitis), Former Smoker 1ppd, Hepatic Steatosis, Hypothyroidism, Sarcoidosis of Lung (not active), PUD/Gastritis, hx C. Diff (2014)      Traveled to [place] and returned one week prior. Fronta dnt op of head over, forehead,  Here for 1 week Hx of HA. Wakes with HA and goes to bed with it. No waxing and waning throughout the day. Constant across forehead and top of head. + photophobia + nausea + occasional lightheadeness. Had a migraine before but this was several decades prior. No focal weakness, blurry vision, dysarthria, dysphagia. No nuchal rigidity or F/C, sinus congestion, sore throat, cough, CP, SOB, diarrhea. Adherent with QHS oxygen for COPD. Feels well otherwise.       Recent admission   presented with N/V/D without abdominal pain for past week yes she would be a again the complement a good idea a the the no of no the mid just show he the it that blood loves  She was given IV NS x 1L in ED, as well as Zofran, Banana Bag, IV Thiamine, and Ativan 2MG IV. CIWA-Ar at the time of my interview was 10. Patient is being admitted for Alcohol Withdrawal and Ketoacidosis and acute pyelonephritis with Escherichia coli bacteremia. Dr. Scottie Pond recommended transition patient to oral ciprofloxacin to complete an extended course of antibiotic treatment       Letrozole started in February 2021.       ### Sleep related breathing disorder-- COPD ###  06/2020 CV Dr Gregorio  9/30/2020 PSG with Parasomnia montage: "Little SDB events appeared to have been noted. Snoring was noted to be mild. The patient did not meet split night criteria set by the doctor.     ### DM ###  Home -210    ### Tobacco abuse/ COPD ###  11/14/2020 CT Chest: Multiple small ground-glass " "opacities in the right lung measuring up to 1.2 cm.  Findings are new when compared with 08/29/2020, which may favor a low-grade infectious or inflammatory process.  Recommend follow-up with noncontrast chest CT in 6-12 months to evaluate for resolution. Mildly enlarged nolvia hepatis lymph nodes and axillary lymph nodes, similar when compared with prior CT         Review of Systems   Constitutional: Negative for chills, fatigue, fever and unexpected weight change.   HENT: Negative for ear pain, hearing loss, postnasal drip, tinnitus, trouble swallowing and voice change.    Respiratory: Negative for cough, chest tightness, shortness of breath and wheezing.    Cardiovascular: Negative for chest pain, palpitations and leg swelling.   Gastrointestinal: Negative for abdominal pain, blood in stool, diarrhea, nausea and vomiting.   Endocrine: Negative for polydipsia, polyphagia and polyuria.   Genitourinary: Negative for difficulty urinating, dysuria, hematuria and vaginal bleeding.   Skin: Negative for rash.   Allergic/Immunologic: Negative for food allergies.   Neurological: Positive for headaches. Negative for dizziness and numbness.   Hematological: Does not bruise/bleed easily.   Psychiatric/Behavioral: The patient is not nervous/anxious.        Objective:      Vitals:    06/02/22 1328   BP: 130/72   Pulse: 105   SpO2: (!) 93%   Weight: 91.6 kg (201 lb 15.1 oz)   Height: 5' 6" (1.676 m)      Physical Exam  Constitutional:       General: She is not in acute distress.     Appearance: She is well-developed.   HENT:      Head: Normocephalic and atraumatic.      Mouth/Throat:      Pharynx: No oropharyngeal exudate.   Eyes:      General: No scleral icterus.     Conjunctiva/sclera: Conjunctivae normal.      Pupils: Pupils are equal, round, and reactive to light.   Neck:      Thyroid: No thyromegaly.   Cardiovascular:      Rate and Rhythm: Normal rate and regular rhythm.      Heart sounds: Normal heart sounds. No murmur " heard.  Pulmonary:      Effort: Pulmonary effort is normal.      Breath sounds: Normal breath sounds. No wheezing or rales.   Abdominal:      General: There is no distension.      Palpations: Abdomen is soft.      Tenderness: There is no abdominal tenderness.   Musculoskeletal:         General: No tenderness.   Lymphadenopathy:      Cervical: No cervical adenopathy.   Skin:     General: Skin is warm and dry.   Neurological:      Mental Status: She is alert and oriented to person, place, and time.   Psychiatric:         Behavior: Behavior normal.         Assessment:       1. Intractable migraine without aura and with status migrainosus        Plan:       Earl was seen today for migraine.    Diagnoses and all orders for this visit:    Intractable migraine without aura and with status migrainosus  -     triamcinolone acetonide injection 40 mg  -     sumatriptan (IMITREX) 100 MG tablet; 0.5-1 tablet earliest onest of migraine. May take addntl dose 2hrs if HA continues. Max 200mg/24hrs. Max 4 days/month           Andrew Chase MD  [facility]        Side effects of medication(s) were discussed in detail and patient voiced understanding.  Patient will call back for any issues or complications.

## 2022-06-02 NOTE — PROGRESS NOTES
"Per order, patient to receive 1mL of Kenalog (triamcinolone acetonide) 40mg/mL. The area of injection was palpated using the medial fold and the iliac crest as anatomical landmarks. Patient was advised to relax the muscle. The area was cleaned with alcohol and allowed to dry. Using a 25g 1.5" needle, 1mL of Kenalog (triamcinolone acetonide) 40mg/mL was placed intramuscularly into the left upper outer gluteal quadrant. Patient experienced no complications and was discharged in stable condition. Kenalog Lot: [id number] Exp: 05/2023    "

## 2022-06-02 NOTE — PROGRESS NOTES
[facility] All Savers Formulary/Covered:    · Oxybutynin   · Solifenacin  · Tolterodine  · Trospium CL    Estimate per online PDP resources- price info unavailable

## 2022-06-03 ENCOUNTER — OFFICE VISIT (OUTPATIENT)
Dept: UROLOGY | Facility: CLINIC | Age: 62
End: 2022-06-03
Payer: COMMERCIAL

## 2022-06-03 VITALS
HEART RATE: 121 BPM | HEIGHT: 66 IN | BODY MASS INDEX: 31.9 KG/M2 | SYSTOLIC BLOOD PRESSURE: 134 MMHG | WEIGHT: 198.5 LBS | DIASTOLIC BLOOD PRESSURE: 63 MMHG

## 2022-06-03 DIAGNOSIS — R15.9 INCONTINENCE OF FECES WITH FECAL URGENCY: Primary | ICD-10-CM

## 2022-06-03 DIAGNOSIS — N39.41 URGENCY INCONTINENCE: ICD-10-CM

## 2022-06-03 DIAGNOSIS — R15.2 INCONTINENCE OF FECES WITH FECAL URGENCY: Primary | ICD-10-CM

## 2022-06-03 PROCEDURE — 3078F DIAST BP <80 MM HG: CPT | Mod: CPTII,S$GLB,, | Performed by: UROLOGY

## 2022-06-03 PROCEDURE — 3066F PR DOCUMENTATION OF TREATMENT FOR NEPHROPATHY: ICD-10-PCS | Mod: CPTII,S$GLB,, | Performed by: UROLOGY

## 2022-06-03 PROCEDURE — 1160F RVW MEDS BY RX/DR IN RCRD: CPT | Mod: CPTII,S$GLB,, | Performed by: UROLOGY

## 2022-06-03 PROCEDURE — 1159F MED LIST DOCD IN RCRD: CPT | Mod: CPTII,S$GLB,, | Performed by: UROLOGY

## 2022-06-03 PROCEDURE — 3008F BODY MASS INDEX DOCD: CPT | Mod: CPTII,S$GLB,, | Performed by: UROLOGY

## 2022-06-03 PROCEDURE — 3008F PR BODY MASS INDEX (BMI) DOCUMENTED: ICD-10-PCS | Mod: CPTII,S$GLB,, | Performed by: UROLOGY

## 2022-06-03 PROCEDURE — 1111F DSCHRG MED/CURRENT MED MERGE: CPT | Mod: CPTII,S$GLB,, | Performed by: UROLOGY

## 2022-06-03 PROCEDURE — 1111F PR DISCHARGE MEDS RECONCILED W/ CURRENT OUTPATIENT MED LIST: ICD-10-PCS | Mod: CPTII,S$GLB,, | Performed by: UROLOGY

## 2022-06-03 PROCEDURE — 4010F ACE/ARB THERAPY RXD/TAKEN: CPT | Mod: CPTII,S$GLB,, | Performed by: UROLOGY

## 2022-06-03 PROCEDURE — 3044F HG A1C LEVEL LT 7.0%: CPT | Mod: CPTII,S$GLB,, | Performed by: UROLOGY

## 2022-06-03 PROCEDURE — 4010F PR ACE/ARB THEARPY RXD/TAKEN: ICD-10-PCS | Mod: CPTII,S$GLB,, | Performed by: UROLOGY

## 2022-06-03 PROCEDURE — 3075F SYST BP GE 130 - 139MM HG: CPT | Mod: CPTII,S$GLB,, | Performed by: UROLOGY

## 2022-06-03 PROCEDURE — 3075F PR MOST RECENT SYSTOLIC BLOOD PRESS GE 130-139MM HG: ICD-10-PCS | Mod: CPTII,S$GLB,, | Performed by: UROLOGY

## 2022-06-03 PROCEDURE — 3044F PR MOST RECENT HEMOGLOBIN A1C LEVEL <7.0%: ICD-10-PCS | Mod: CPTII,S$GLB,, | Performed by: UROLOGY

## 2022-06-03 PROCEDURE — 99204 OFFICE O/P NEW MOD 45 MIN: CPT | Mod: S$GLB,,, | Performed by: UROLOGY

## 2022-06-03 PROCEDURE — 3061F PR NEG MICROALBUMINURIA RESULT DOCUMENTED/REVIEW: ICD-10-PCS | Mod: CPTII,S$GLB,, | Performed by: UROLOGY

## 2022-06-03 PROCEDURE — 3066F NEPHROPATHY DOC TX: CPT | Mod: CPTII,S$GLB,, | Performed by: UROLOGY

## 2022-06-03 PROCEDURE — 1159F PR MEDICATION LIST DOCUMENTED IN MEDICAL RECORD: ICD-10-PCS | Mod: CPTII,S$GLB,, | Performed by: UROLOGY

## 2022-06-03 PROCEDURE — 3078F PR MOST RECENT DIASTOLIC BLOOD PRESSURE < 80 MM HG: ICD-10-PCS | Mod: CPTII,S$GLB,, | Performed by: UROLOGY

## 2022-06-03 PROCEDURE — 1160F PR REVIEW ALL MEDS BY PRESCRIBER/CLIN PHARMACIST DOCUMENTED: ICD-10-PCS | Mod: CPTII,S$GLB,, | Performed by: UROLOGY

## 2022-06-03 PROCEDURE — 3061F NEG MICROALBUMINURIA REV: CPT | Mod: CPTII,S$GLB,, | Performed by: UROLOGY

## 2022-06-03 PROCEDURE — 99204 PR OFFICE/OUTPT VISIT, NEW, LEVL IV, 45-59 MIN: ICD-10-PCS | Mod: S$GLB,,, | Performed by: UROLOGY

## 2022-06-03 NOTE — PROGRESS NOTES
[facility] Department of Urology      New Overactive Bladder (OAB) Note    6/3/2022    Referred by:  Kalin Desir MD    \[facility] [place] [facility]\"": Earl Abdul is a very pleasant 62 y.o. female who is a new patient to our department referred for evaluation of urinary incontinence and fecal incontinence of several years duration. She has already completed a voiding diary which was reviewed today. She has previously seen Dr. Umaña back in December.  She reports bother is associated with urinary daytime frequency (11-14x daily), with nocturia (3-4x per night) and with urgency that results in urinary incontinence 5 x daily. She reports some stress urinary incontinence which is less bothersome to her than her urgency incontinence. She requires adult briefs (5 briefs/day).  She has decreased overall fluid intake.  She reports urinary incontinence is day and night but more predominant during the day. Patient reports urgency is independent of position.     She is also bothered by fecal incontinence, having up to 4 fecal incontinence episodes per day. She has tried pelvic floor exercises, dietary changes, fiber bulking and immodium for her fecal incontinence.     She denies symptoms of irritative voiding including dysuria. She denies symptoms of obstructive voiding including decreased stream, hesitancy, intermittency, post void dribbling and sense of incomplete emptying. Bladder scan PVR was 0 mL. Her history includes no notation of urolithiasis, hematuria, prior pelvic surgery, previous prolapse or incontinence procedures or neurological symptoms/diagnoses. She denies all other prior pelvic surgeries or neurologic diagnoses. She does not report symptoms suggestive of advanced POP. She denies a history of constipation.     A review of 10+ systems was conducted with pertinent positive and negative findings documented in HPI with all other systems reviewed and negative.    Past medical, family, surgical and social history reviewed as  documented in chart with pertinent positive medical, family, surgical and social history detailed in HPI.    Exam Findings:    Const: no acute distress, conversant and alert  Eyes: anicteric, extraocular muscles intact  ENMT: normocephalic, Nl oral membranes  Cardio: no cyanosis, nl cap refill  Pulm: no tachypnea; no resp distress  Abd: soft, no tenderness  Musc: no laceration, no tenderness  Neuro: alert; oriented x 3  Skin: warm, dry; no petichiae  Psych: no anxiety; normal speech     Assessment/Plan:     Overactive Bladder with Urgency Incontinence (new, addt'l workup): Her history is suggestive of Overactive Bladder (OAB) with predominantly Urgency Urinary Incontinence (UUI). These findings have been confirmed by the completed 3-day voiding diary that she returned today. She has up to 5 urgency incontinence episodes per day. Given the concomitant fecal incontinence which is refractory to more conservative therapy, she would like to proceed with staged testing of SNM for her urinary incontinence.        Fecal Incontinence with fecal urgency (new, addt'l workup): She has failed to see satisfactory improvement with numerous more conservative therapies as detailed above. She continues to have up to 4 fecal incontinence episodes per day with fecal urgency with each of these. She would like to proceed with staged testing of SNM for treatment of refractory fecal incontinence.     This patient will be scheduled for staged implantation of lead (CPT 86865) as a 1-2 week test of whether there is sufficient response to justify implantation of a permanent generator (CPT 19192). Please note that the placement of a staged  shares the same CPT code (20687) as placement of a permanent lead and generator (additional CPT 44492) after a successful basic evaluation test . This patient must demonstrate at least 50% improvement in symptoms to proceed to generator implantation, which is the purpose of this test. Clinical  data to support the placement of the patient generator (CPT 08549) can only be supplied after placement of the lead (CPT 28205) in this testing scenario.

## 2022-06-05 ENCOUNTER — PATIENT MESSAGE (OUTPATIENT)
Dept: INTERNAL MEDICINE | Facility: CLINIC | Age: 62
End: 2022-06-05
Payer: COMMERCIAL

## 2022-06-05 DIAGNOSIS — R11.0 NAUSEA: Primary | ICD-10-CM

## 2022-06-06 ENCOUNTER — TELEPHONE (OUTPATIENT)
Dept: UROLOGY | Facility: CLINIC | Age: 62
End: 2022-06-06
Payer: COMMERCIAL

## 2022-06-06 DIAGNOSIS — N39.41 URGENCY INCONTINENCE: Primary | ICD-10-CM

## 2022-06-06 RX ORDER — ONDANSETRON 4 MG/1
8 TABLET, ORALLY DISINTEGRATING ORAL 2 TIMES DAILY
Qty: 30 TABLET | Refills: 0 | Status: SHIPPED | OUTPATIENT
Start: 2022-06-06 | End: 2022-06-29 | Stop reason: SDUPTHER

## 2022-06-06 RX ORDER — METHYLPREDNISOLONE 4 MG/1
TABLET ORAL
Qty: 1 EACH | Refills: 0 | Status: SHIPPED | OUTPATIENT
Start: 2022-06-06 | End: 2022-06-29 | Stop reason: CLARIF

## 2022-06-06 NOTE — TELEPHONE ENCOUNTER
----- Message from Khadar Mares sent at 6/6/2022  8:16 AM CDT -----  Name of Who is Calling: MARTY SALDAÑA        What is the request in detail: The patient states she is still having headaches, Please advise         Can the clinic reply by [facility]: No         What Number to Call Back if not in [facility]: [phone]

## 2022-06-06 NOTE — TELEPHONE ENCOUNTER
No new care gaps identified.  [facility] Embedded Care Gaps. Reference number: [id number]. 6/06/2022   2:56:59 PM CDT

## 2022-06-07 ENCOUNTER — PATIENT MESSAGE (OUTPATIENT)
Dept: INTERNAL MEDICINE | Facility: CLINIC | Age: 62
End: 2022-06-07
Payer: COMMERCIAL

## 2022-06-07 DIAGNOSIS — R51.9 NEW ONSET OF HEADACHES: Primary | ICD-10-CM

## 2022-06-10 ENCOUNTER — HOSPITAL ENCOUNTER (EMERGENCY)
Facility: HOSPITAL | Age: 62
Discharge: HOME | End: 2022-06-10
Attending: EMERGENCY MEDICINE
Payer: COMMERCIAL

## 2022-06-10 VITALS
BODY MASS INDEX: 31.5 KG/M2 | RESPIRATION RATE: 17 BRPM | SYSTOLIC BLOOD PRESSURE: 173 MMHG | TEMPERATURE: 98 F | DIASTOLIC BLOOD PRESSURE: 76 MMHG | HEIGHT: 66 IN | WEIGHT: 196 LBS | HEART RATE: 98 BPM | OXYGEN SATURATION: 98 %

## 2022-06-10 DIAGNOSIS — J18.9 PNEUMONIA OF LEFT LOWER LOBE DUE TO INFECTIOUS ORGANISM: ICD-10-CM

## 2022-06-10 DIAGNOSIS — R51.9 NONINTRACTABLE HEADACHE, UNSPECIFIED CHRONICITY PATTERN, UNSPECIFIED HEADACHE TYPE: Primary | ICD-10-CM

## 2022-06-10 LAB
ALBUMIN SERPL BCP-MCNC: 4.5 G/DL (ref 3.5–5.2)
ALP SERPL-CCNC: 55 U/L (ref 55–135)
ALT SERPL W/O P-5'-P-CCNC: 54 U/L (ref 10–44)
ANION GAP SERPL CALC-SCNC: 20 MMOL/L (ref 8–16)
AST SERPL-CCNC: 55 U/L (ref 10–40)
BASOPHILS NFR BLD: 0 % (ref 0–1.9)
BILIRUB SERPL-MCNC: 0.6 MG/DL (ref 0.1–1)
BILIRUB UR QL STRIP: NEGATIVE
BUN SERPL-MCNC: 24 MG/DL (ref 8–23)
CALCIUM SERPL-MCNC: 9.3 MG/DL (ref 8.7–10.5)
CHLORIDE SERPL-SCNC: 98 MMOL/L (ref 95–110)
CLARITY UR REFRACT.AUTO: CLEAR
CO2 SERPL-SCNC: 20 MMOL/L (ref 23–29)
COLOR UR AUTO: YELLOW
CREAT SERPL-MCNC: 1.1 MG/DL (ref 0.5–1.4)
CTP QC/QA: YES
DIFFERENTIAL METHOD: ABNORMAL
EOSINOPHIL NFR BLD: 0 % (ref 0–8)
ERYTHROCYTE [DISTWIDTH] IN BLOOD BY AUTOMATED COUNT: 18.6 % (ref 11.5–14.5)
ERYTHROCYTE [SEDIMENTATION RATE] IN BLOOD BY WESTERGREN METHOD: 11 MM/HR (ref 0–36)
EST. GFR  (AFRICAN AMERICAN): >60 ML/MIN/1.73 M^2
EST. GFR  (NON AFRICAN AMERICAN): 53.2 ML/MIN/1.73 M^2
GLUCOSE SERPL-MCNC: 99 MG/DL (ref 70–110)
GLUCOSE UR QL STRIP: NEGATIVE
HCT VFR BLD AUTO: 41.3 % (ref 37–48.5)
HGB BLD-MCNC: 12.7 G/DL (ref 12–16)
HGB UR QL STRIP: NEGATIVE
IMM GRANULOCYTES # BLD AUTO: ABNORMAL K/UL (ref 0–0.04)
IMM GRANULOCYTES NFR BLD AUTO: ABNORMAL % (ref 0–0.5)
KETONES UR QL STRIP: ABNORMAL
LEUKOCYTE ESTERASE UR QL STRIP: ABNORMAL
LYMPHOCYTES NFR BLD: 68 % (ref 18–48)
MCH RBC QN AUTO: 29 PG (ref 27–31)
MCHC RBC AUTO-ENTMCNC: 30.8 G/DL (ref 32–36)
MCV RBC AUTO: 94 FL (ref 82–98)
MICROSCOPIC COMMENT: NORMAL
MONOCYTES NFR BLD: 1 % (ref 4–15)
NEUTROPHILS NFR BLD: 31 % (ref 38–73)
NITRITE UR QL STRIP: NEGATIVE
NRBC BLD-RTO: 0 /100 WBC
PATH REV BLD -IMP: NORMAL
PH UR STRIP: 5 [PH] (ref 5–8)
PLATELET # BLD AUTO: 182 K/UL (ref 150–450)
PMV BLD AUTO: 10.3 FL (ref 9.2–12.9)
POTASSIUM SERPL-SCNC: 4.6 MMOL/L (ref 3.5–5.1)
PROT SERPL-MCNC: 8 G/DL (ref 6–8.4)
PROT UR QL STRIP: NEGATIVE
RBC # BLD AUTO: 4.38 M/UL (ref 4–5.4)
RBC #/AREA URNS AUTO: 1 /HPF (ref 0–4)
SARS-COV-2 RDRP RESP QL NAA+PROBE: NEGATIVE
SMUDGE CELLS BLD QL SMEAR: PRESENT
SODIUM SERPL-SCNC: 138 MMOL/L (ref 136–145)
SP GR UR STRIP: 1.01 (ref 1–1.03)
SQUAMOUS #/AREA URNS AUTO: 1 /HPF
URN SPEC COLLECT METH UR: ABNORMAL
WBC # BLD AUTO: 23.55 K/UL (ref 3.9–12.7)
WBC #/AREA URNS AUTO: 3 /HPF (ref 0–5)

## 2022-06-10 PROCEDURE — 85027 COMPLETE CBC AUTOMATED: CPT | Performed by: PHYSICIAN ASSISTANT

## 2022-06-10 PROCEDURE — 81001 URINALYSIS AUTO W/SCOPE: CPT | Performed by: PHYSICIAN ASSISTANT

## 2022-06-10 PROCEDURE — 99284 EMERGENCY DEPT VISIT MOD MDM: CPT | Mod: CS,,, | Performed by: EMERGENCY MEDICINE

## 2022-06-10 PROCEDURE — 99284 PR EMERGENCY DEPT VISIT,LEVEL IV: ICD-10-PCS | Mod: CS,,, | Performed by: EMERGENCY MEDICINE

## 2022-06-10 PROCEDURE — 85060 PATHOLOGIST REVIEW: ICD-10-PCS | Mod: ,,, | Performed by: PATHOLOGY

## 2022-06-10 PROCEDURE — 85007 BL SMEAR W/DIFF WBC COUNT: CPT | Performed by: PHYSICIAN ASSISTANT

## 2022-06-10 PROCEDURE — 96375 TX/PRO/DX INJ NEW DRUG ADDON: CPT

## 2022-06-10 PROCEDURE — 80053 COMPREHEN METABOLIC PANEL: CPT | Performed by: PHYSICIAN ASSISTANT

## 2022-06-10 PROCEDURE — 96374 THER/PROPH/DIAG INJ IV PUSH: CPT

## 2022-06-10 PROCEDURE — 63600175 PHARM REV CODE 636 W HCPCS: Performed by: PHYSICIAN ASSISTANT

## 2022-06-10 PROCEDURE — 99285 EMERGENCY DEPT VISIT HI MDM: CPT | Mod: 25

## 2022-06-10 PROCEDURE — 96361 HYDRATE IV INFUSION ADD-ON: CPT

## 2022-06-10 PROCEDURE — U0002 COVID-19 LAB TEST NON-CDC: HCPCS | Performed by: EMERGENCY MEDICINE

## 2022-06-10 PROCEDURE — 85652 RBC SED RATE AUTOMATED: CPT | Performed by: PHYSICIAN ASSISTANT

## 2022-06-10 PROCEDURE — 25000003 PHARM REV CODE 250: Performed by: PHYSICIAN ASSISTANT

## 2022-06-10 PROCEDURE — 85060 BLOOD SMEAR INTERPRETATION: CPT | Mod: ,,, | Performed by: PATHOLOGY

## 2022-06-10 RX ORDER — ACETAMINOPHEN 325 MG/1
650 TABLET ORAL
Status: COMPLETED | OUTPATIENT
Start: 2022-06-10 | End: 2022-06-10

## 2022-06-10 RX ORDER — AZITHROMYCIN 250 MG/1
250 TABLET, FILM COATED ORAL DAILY
Qty: 6 TABLET | Refills: 0 | Status: SHIPPED | OUTPATIENT
Start: 2022-06-10 | End: 2022-06-29

## 2022-06-10 RX ORDER — METOCLOPRAMIDE HYDROCHLORIDE 5 MG/ML
10 INJECTION INTRAMUSCULAR; INTRAVENOUS
Status: COMPLETED | OUTPATIENT
Start: 2022-06-10 | End: 2022-06-10

## 2022-06-10 RX ORDER — AZITHROMYCIN 250 MG/1
250 TABLET, FILM COATED ORAL DAILY
Qty: 6 TABLET | Refills: 0 | Status: SHIPPED | OUTPATIENT
Start: 2022-06-10 | End: 2022-06-10 | Stop reason: SDUPTHER

## 2022-06-10 RX ORDER — DIPHENHYDRAMINE HYDROCHLORIDE 50 MG/ML
25 INJECTION INTRAMUSCULAR; INTRAVENOUS
Status: COMPLETED | OUTPATIENT
Start: 2022-06-10 | End: 2022-06-10

## 2022-06-10 RX ADMIN — METOCLOPRAMIDE 10 MG: 5 INJECTION, SOLUTION INTRAMUSCULAR; INTRAVENOUS at 08:06

## 2022-06-10 RX ADMIN — SODIUM CHLORIDE 1000 ML: 0.9 INJECTION, SOLUTION INTRAVENOUS at 08:06

## 2022-06-10 RX ADMIN — ACETAMINOPHEN 650 MG: 325 TABLET ORAL at 08:06

## 2022-06-10 RX ADMIN — CEFTRIAXONE 1 G: 1 INJECTION, SOLUTION INTRAVENOUS at 12:06

## 2022-06-10 RX ADMIN — DIPHENHYDRAMINE HYDROCHLORIDE 25 MG: 50 INJECTION, SOLUTION INTRAMUSCULAR; INTRAVENOUS at 10:06

## 2022-06-10 NOTE — ED PROVIDER NOTES
Encounter Date: 6/10/2022       History     Chief Complaint   Patient presents with    Migraine     X 1 week, take Imitrex and a steroid, hx migraines--her PCP recommended a CT scan, reports she can't eat; +photophobia, n/v, dizziness     62-year-old female with history of DM T2, anemia, GERD, COPD, hypertension, hyperlipidemia presents emergency department for headache.  Patient states that she has been having a headache for 2 weeks which she has seen her PCP who gave her a steroid injection as well as Imitrex and a Medrol Dosepak with no relief.  Reports that the headache is constant it is in both the frontal and posterior area and currently rates it a 10/10.  Patient reports nausea vomiting, photophobia, phonophobia and pain to the right temple.  Patient denies any blurred vision, double vision, fevers/chills, chest pain or shortness of breath.        Review of patient's allergies indicates:   Allergen Reactions    Lortab  [hydrocodone-acetaminophen] Itching    Promethazine Other (See Comments) and Itching     Other reaction(s): Itching     Past Medical History:   Diagnosis Date    Anemia     Anemia     Controlled type 2 diabetes mellitus without complication, without long-term current use of insulin 11/30/2021    COPD (chronic obstructive pulmonary disease)     Depression     Diverticulitis     Fatty liver     GERD (gastroesophageal reflux disease)     Hyperlipidemia     Hypertension     Pancreatitis     Peptic ulcer disease     Polysubstance abuse     Posterior reversible encephalopathy syndrome     Sarcoidosis of lung     Sarcoidosis of lung     over 30 yrs ago    Seizures     7/2017    Suicide attempt     Suicide ideation      Past Surgical History:   Procedure Laterality Date    APPENDECTOMY      BILATERAL MASTECTOMY Bilateral 10/29/2020    Procedure: MASTECTOMY, BILATERAL;  Surgeon: Baylee Kevin MD;  Location: [facility] OR [address] [place] [address];  Service: General;  Laterality: Bilateral;    BREAST  REVISION SURGERY Bilateral 2/11/2021    Procedure: BREAST REVISION SURGERY;  Surgeon: Scottie Johnson MD;  Location: [facility] OR [address] [place] [address];  Service: Plastics;  Laterality: Bilateral;    COLONOSCOPY N/A 7/28/2017    Procedure: COLONOSCOPY;  Surgeon: Aaron Alvarado MD;  Location: [facility];  Service: Endoscopy;  Laterality: N/A;    ESOPHAGOGASTRODUODENOSCOPY  10/7/2016, 11/6/2014    2016 - gastritis, duodenitis, 2014 erosive gastritis    ESOPHAGOGASTRODUODENOSCOPY N/A 2/11/2020    Procedure: ESOPHAGOGASTRODUODENOSCOPY (EGD);  Surgeon: Fawn Garrido MD;  Location: [facility] ENDO;  Service: Endoscopy;  Laterality: N/A;    ESOPHAGOGASTRODUODENOSCOPY N/A 4/19/2021    Procedure: EGD (ESOPHAGOGASTRODUODENOSCOPY);  Surgeon: Paramjit Martino MD;  Location: [facility];  Service: Endoscopy;  Laterality: N/A;    FLEXIBLE SIGMOIDOSCOPY  11/06/2014    colitis    HYSTERECTOMY      INJECTION FOR SENTINEL NODE IDENTIFICATION Right 10/29/2020    Procedure: INJECTION, FOR SENTINEL NODE IDENTIFICATION;  Surgeon: Baylee Kevin MD;  Location: [address];  Service: General;  Laterality: Right;    INJECTION OF JOINT Right 10/10/2019    Procedure: Injection, Joint RIGHT ILIOPSOAS BURSA/TENDON INJECTION AND RIGHT GLUTEAL TENDON INJECTION WITH STEROID AND LIDOCAINE;  Surgeon: Guillaume Rico MD;  Location: [facility];  Service: Pain Management;  Laterality: Right;  NEEDS CONSENT    INSERTION OF BREAST TISSUE EXPANDER Bilateral 10/29/2020    Procedure: INSERTION, TISSUE EXPANDER, BREAST;  Surgeon: Scottie Johnson MD;  Location: [facility] OR [address] [place] [address];  Service: Plastics;  Laterality: Bilateral;  Right breast: 1082 g  Left breast: 1076 g    LIPOSUCTION Bilateral 2/11/2021    Procedure: LIPOSUCTION;  Surgeon: Scottie Johnson MD;  Location: [facility] OR [address] [place] [address];  Service: Plastics;  Laterality: Bilateral;    mediastenoscopy      REPLACEMENT OF IMPLANT OF BREAST Bilateral 2/11/2021    Procedure: REPLACEMENT, IMPLANT,  BREAST;  Surgeon: Scottie Johnson MD;  Location: [facility] OR [address] [place] [address];  Service: Plastics;  Laterality: Bilateral;    SENTINEL LYMPH NODE BIOPSY Right 10/29/2020    Procedure: BIOPSY, LYMPH NODE, SENTINEL;  Surgeon: Baylee Kevin MD;  Location: [facility] OR [address] [place] [address];  Service: General;  Laterality: Right;    TONSILLECTOMY N/A     TUBAL LIGATION       Family History   Problem Relation Age of Onset    Heart attack Father     Diabetes Father     Hypertension Father     Diabetes Mother     Hypertension Mother     Breast cancer Maternal Aunt     Colon cancer Maternal Uncle     Breast cancer Daughter     Ovarian cancer Neg Hx     Cancer Neg Hx      Social History     Tobacco Use    Smoking status: Current Every Day Smoker     Packs/day: 0.50     Years: 30.00     Pack years: 15.00     Types: Cigarettes     Last attempt to quit: 2021     Years since quittin.3    Smokeless tobacco: Never Used   Substance Use Topics    Alcohol use: Yes     Comment: daily     Drug use: Yes     Types: Marijuana     Comment: currently     Review of Systems   Constitutional: Negative for chills and fever.   HENT: Negative for sore throat.    Eyes: Positive for photophobia. Negative for visual disturbance.   Respiratory: Negative for shortness of breath.    Cardiovascular: Negative for chest pain.   Gastrointestinal: Positive for nausea and vomiting. Negative for abdominal pain.   Genitourinary: Negative for difficulty urinating.   Musculoskeletal: Negative.    Skin: Negative.    Neurological: Positive for headaches. Negative for weakness.   Psychiatric/Behavioral: Negative for confusion.       Physical Exam     Initial Vitals [06/10/22 0707]   BP Pulse Resp Temp SpO2   (!) 140/65 97 18 98.3 °F (36.8 °C) 95 %      MAP       --         Physical Exam    Nursing note and vitals reviewed.  Constitutional: She appears well-developed and well-nourished. She is not diaphoretic. No distress.   HENT:   Head: Normocephalic and  atraumatic.   Eyes: Conjunctivae are normal. Pupils are equal, round, and reactive to light.   Neck: Neck supple.   Normal range of motion.  Cardiovascular: Normal rate, regular rhythm, normal heart sounds and intact distal pulses. Exam reveals no gallop and no friction rub.    No murmur heard.  Pulmonary/Chest: Breath sounds normal.   Abdominal: Abdomen is soft. Bowel sounds are normal. She exhibits no distension and no mass. There is no abdominal tenderness. There is no rebound and no guarding.   Musculoskeletal:         General: Normal range of motion.      Cervical back: Normal range of motion and neck supple.     Neurological: She is alert and oriented to person, place, and time. No cranial nerve deficit. GCS score is 15. GCS eye subscore is 4. GCS verbal subscore is 5. GCS motor subscore is 6.   Skin: Skin is warm and dry. Capillary refill takes less than 2 seconds.         ED Course   Procedures  Labs Reviewed   CBC W/ AUTO DIFFERENTIAL - Abnormal; Notable for the following components:       Result Value    WBC 23.55 (*)     MCHC 30.8 (*)     RDW 18.6 (*)     Gran % 31.0 (*)     Lymph % 68.0 (*)     Mono % 1.0 (*)     All other components within normal limits   COMPREHENSIVE METABOLIC PANEL - Abnormal; Notable for the following components:    CO2 20 (*)     BUN 24 (*)     AST 55 (*)     ALT 54 (*)     Anion Gap 20 (*)     eGFR if non  53.2 (*)     All other components within normal limits   URINALYSIS, REFLEX TO URINE CULTURE - Abnormal; Notable for the following components:    Ketones, UA Trace (*)     Leukocytes, UA Trace (*)     All other components within normal limits    Narrative:     Specimen Source->Urine   SEDIMENTATION RATE   URINALYSIS MICROSCOPIC    Narrative:     Specimen Source->Urine   SARS-COV-2 RDRP GENE          Imaging Results          X-Ray Chest AP Portable (Final result)  Result time 06/10/22 11:11:34    Final result by Columba Contreras MD (06/10/22 11:11:34)                  Impression:      New left basilar airspace disease, which could be secondary to atelectasis, aspiration or infection.      Electronically signed by: Columba Contreras  Date:    06/10/2022  Time:    11:11             Narrative:    EXAMINATION:  XR CHEST AP PORTABLE    CLINICAL HISTORY:  headache;    TECHNIQUE:  Single frontal view of the chest was performed.    COMPARISON:  01/08/2022    FINDINGS:  There is left basilar airspace disease that is new when compared to prior imaging.  No pleural effusion.  Heart size is unchanged.                               MRI Brain Without Contrast (Final result)  Result time 06/10/22 09:45:14    Final result by Miguel Malagon MD (06/10/22 09:45:14)                 Impression:      No evidence of acute intracranial pathology.      Electronically signed by: Miguel Malagon MD  Date:    06/10/2022  Time:    09:45             Narrative:    EXAMINATION:  MRI BRAIN WITHOUT CONTRAST    CLINICAL HISTORY:  hx of PRES;    TECHNIQUE:  Multiplanar multisequence MR imaging of the brain was performed without intravenous contrast.    COMPARISON:  Head CT 06/10/2022, brain MRI 10/04/2017, 07/21/2017    FINDINGS:  Intracranial Compartment:    Ventricles are normal in size for age without evidence of hydrocephalus.    Ill-defined focus T2-FLAIR signal hyperintensity in the right periatrial white matter.  Few additional scattered punctate foci elsewhere within the supratentorial white matter.  These appear similar to the prior study of 10/04/2017.  No definite new focal lesions.  No diffusion restriction to indicate an acute infarction.  No remote major vascular distribution infarct.  No recent or remote hemorrhage.  No mass effect or midline shift.    No extra-axial blood or fluid collections.    Normal vascular flow voids are preserved.    Skull/Extracranial Contents (limited evaluation):    Bone marrow signal intensity is normal.                               CT Head Without Contrast (Final  result)  Result time 06/10/22 08:36:08    Final result by Tyson Bob MD (06/10/22 08:36:08)                 Impression:      No acute intracranial process.      Electronically signed by: Tyson Bob  Date:    06/10/2022  Time:    08:36             Narrative:    EXAMINATION:  CT HEAD WITHOUT CONTRAST    CLINICAL HISTORY:  Headache, chronic, new features or increased frequency;    TECHNIQUE:  Low dose axial images were obtained through the head.  Coronal and sagittal reformations were also performed. Contrast was not administered.    COMPARISON:  MRI 10/04/2017 CT 07/20/2017    FINDINGS:  There is no evidence of hydrocephalus mass effect intracranial hemorrhage or acute territorial infarct.    The visualized sinuses and mastoid air cells are clear.                                 Medications   cefTRIAXone (ROCEPHIN) 1 g/50 mL D5W IVPB (has no administration in time range)   metoclopramide HCl injection 10 mg (10 mg Intravenous Given 6/10/22 0821)   sodium chloride 0.9% bolus 1,000 mL (0 mLs Intravenous Stopped 6/10/22 1132)   acetaminophen tablet 650 mg (650 mg Oral Given 6/10/22 0820)   diphenhydrAMINE injection 25 mg (25 mg Intravenous Given 6/10/22 1007)     Medical Decision Making:   History:   Old Medical Records: I decided to obtain old medical records.  Old Records Summarized: records from previous admission(s).  Clinical Tests:   Lab Tests: Ordered and Reviewed  Radiological Study: Ordered and Reviewed  Medical Tests: Ordered and Reviewed  Other:   I have discussed this case with another health care provider.       APC / Resident Notes:   62 y.o. year old female presenting with intractable headache x2 weeks.  On exam patient is afebrile and nontoxic.  Heart rate and rhythm are regular. Lungs with clear breath sounds throughout. Abdomen is soft, nontender. No edema.  No focal neurological deficits    DDx includes but is not limited to press syndrome, migraine, headache, COVID-19, pneumonia,  meningitis, giant cell arteritis    ED workup reveals given patient's persistent headache despite steroids and Imitrex from PCP will obtain CT head.  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  Imaging of the head did not show any acute intracranial abnormalities.  COVID-19 negative.  CBCs notable for significant leukocytosis of 23. UA suspicious for new left basilar opacity with history of COPD will treat for pneumonia with Rocephin in the ED with p.o. azithromycin at discharge.  Does have mild right scalp tenderness and ESR was obtained which was within normal limits doubt giant cell arteritis.  Patient was given migraine cocktail in the ED with improvement of her headache and on reassessment states that her headache is down to a 5/10 and is comfortable with discharge home.  No meningismus on exam today suspect that her white count is potentially from her pneumonia and overall patient is nontoxic appearing with reassuring vitals.  Patient given strict return precautions and advised to have close follow-up with PCP.    Discussed findings and plan with patient who verbalized understanding and agrees with the plan and course of treatment. Return to ED precautions discussed. Patient is stable for discharge. I discussed the care of this patient with my supervising physician.         Attending Attestation:     Physician Attestation Statement for NP/PA:   I have conducted a face to face encounter with this patient in addition to the NP/PA, due to Medical Complexity    Other NP/PA Attestation Additions:    History of Present Illness: This is a 62-year-old female with multiple medical comorbidities including hypertension, hyperlipidemia, diabetes, and migraines who for the past 2 weeks has had a headache.  She has been seen by her primary physician and has had Imitrex and the Medrol Dosepak but the headache has continued.  In her description to me she states it is fairly diffuse.  She  has also felt a bit more fatigued.  She has had no tick bites.  This headache was not thunderclap in nature.  She once again does have a history of migraines.  She has no neck pain or stiffness.  She has not had any documented fever.  No chest pain.  No shortness of breath.  No abdominal pain.  She does have some nausea and photophobia.     Physical Exam: VS upon arrival:  140/65; 97; 18; 95% room air; 98.3° F oral.  Consititutional: Pt is awake, alert, and oriented x 4. Does not appear to be in any toshia distress.    HEENT: PERRL; EOMI; nares patent; op clear; mmm without lesions.  No temporal tenderness on my exam bilaterally.  Neck: Supple with good ROM.  No meningismus.  CV: Normal rate; regular rhythm; no mrg. Heart sounds normal. No peripheral edema. 2+ radials bilateral and symmetric.  Respiratory: CTA bilaterally with no focal rales, ronchi, or wheezes.  GI: Abdomen soft, NTND. No rebound. No guarding. BS normal.  MSK: No long bone deformities; no focal joint swellings.  Neuro:  GCS 15. 5/5 motor function throughout bilateral upper and lower extremities both proximal, distal, and symmetric.  Sensation to fine touch is grossly intact throughout as well.  Gait is normal.    Skin: No skin lesions or rash.    Medical Decision Making: As above, I was not concerned for subarachnoid or meningitis given the character of her presentation.  I did look through her past epic records and it does appear that she has a history of PRES and thus I did want to assure that this was not of concern today.  We did obtain an MRI that was independently reviewed and interpreted by me and interpreted by radiology that showed no evidence of concern for PRES or any other lesion or bleed.  We also worked to exclude other concerning etiologies for headache even though she had a previous history of migraines.  Given her age, we did want to assure we excluded giant cell arteritis.  Her ESR today is 11 which I do feel is very reassuring.   We did find her white count is 23.55.  She did have a recent course of steroids and thus I felt that this could be in part a reason for this leukocytosis.  We also did search for other causes.  Once again, I felt that bacterial meningitis is highly unlikely and that other causes a meningitis were also unlikely.  We did find on her chest x-ray, independently reviewed and interpreted by me and interpreted by Radiology, that she does have concern for left basilar infiltrate.  I suppose this certainly could be attributing to her elevated white count and some of her symptoms.  We will treat this with antibiotics.  Her urinalysis did not reveal infection.  Otherwise, her laboratory studies revealed a negative COVID.  She did have a mildly depressed bicarb and her urine did show ketones but her glucose was 99. I think a euglycemic DKA would be highly unlikely particularly given the fact that she is not on a flozin /medication that is associated with this.  We have treated her headache with IV fluids as well as IV medications and she has had improvement in her symptoms.  We have discussed this plan with her in detail that we will send the patient home with very close follow-up with her primary physician.  We will treat her with antibiotics for her symptoms.  We will cover her with atypical antibiotic coverage.  This would also cover tick-borne illness even though she does not have history of bite.  She has been given strict return precautions.  She is discharged home in improved condition.  ED diagnosis:  1. Acute cephalgia.  2. Acute dehydration, requiring intravenous fluid hydration.  3. Acute left lower lobe pneumonia.  4. Acute leukocytosis.  5. Above diagnoses complicated by known history of diabetes, migraine, history of PRES, and htn.                        Clinical Impression:   Final diagnoses:  [R51.9] Nonintractable headache, unspecified chronicity pattern, unspecified headache type (Primary)  [J18.9] Pneumonia  of left lower lobe due to infectious organism          ED Disposition Condition    Discharge Stable        ED Prescriptions     Medication Sig Dispense Start Date End Date Auth. Provider    azithromycin (Z-DENVER) 250 MG tablet Take 1 tablet (250 mg total) by mouth once daily. Take first 2 tablets together, then 1 every day until finished. 6 tablet 6/10/2022  Paris Weaver PA-C        Follow-up Information     Follow up With Specialties Details Why Contact Info    Andrew Rodriguez MD Internal Medicine Schedule an appointment as soon as possible for a visit   [address] [address]  [phone]             Paris Weaver PA-C  06/10/22 1159       Glory Lezama MD  06/10/22 0105

## 2022-06-10 NOTE — DISCHARGE INSTRUCTIONS
Your seen in the emergency department today for headache.  The CT scan and MRI did not show any intracranial abnormalities and no evidence of pres syndrome today.  Your blood work did show a high white blood cell count which we suspect is due to a walking pneumonia.  I have prescribed you antibiotics for this.  Please make a close follow-up with your primary care provider to ensure that this is improving.  Monitor your symptoms and return to the ED sooner for any new or worsening symptoms.

## 2022-06-10 NOTE — ED NOTES
LOC: The patient is awake and alert; oriented x 3 and speaking appropriately.  APPEARANCE: Patient resting comfortably, patient is clean and well groomed  SKIN: warm and dry, normal skin turgor & moist mucus membranes, skin intact, no breakdown noted.  MUSCULOSKELETAL: Patient moving all extremities well, no obvious swelling or deformities noted, c/o chronic rt hip pain   RESPIRATORY: Airway is open and patent,  respirations are spontaneous, normal effort and rate  CARDIAC: Patient has a normal rate, no peripheral edema noted, capillary refill < 3 seconds; No complaints of chest pain   ABDOMEN: Soft and non tender to palpation, no distention noted. Bowel sounds present x 4

## 2022-06-11 ENCOUNTER — PATIENT MESSAGE (OUTPATIENT)
Dept: INTERNAL MEDICINE | Facility: CLINIC | Age: 62
End: 2022-06-11
Payer: COMMERCIAL

## 2022-06-11 DIAGNOSIS — C91.10 CLL (CHRONIC LYMPHOCYTIC LEUKEMIA): Primary | ICD-10-CM

## 2022-06-11 NOTE — ED PROVIDER NOTES
Encounter Date: 6/10/2022       History     Chief Complaint   Patient presents with    Migraine     X 1 week, take Imitrex and a steroid, hx migraines--her PCP recommended a CT scan, reports she can't eat; +photophobia, n/v, dizziness     HPI  Review of patient's allergies indicates:   Allergen Reactions    Lortab  [hydrocodone-acetaminophen] Itching    Promethazine Other (See Comments) and Itching     Other reaction(s): Itching     Past Medical History:   Diagnosis Date    Anemia     Anemia     Controlled type 2 diabetes mellitus without complication, without long-term current use of insulin 11/30/2021    COPD (chronic obstructive pulmonary disease)     Depression     Diverticulitis     Fatty liver     GERD (gastroesophageal reflux disease)     Hyperlipidemia     Hypertension     Pancreatitis     Peptic ulcer disease     Polysubstance abuse     Posterior reversible encephalopathy syndrome     Sarcoidosis of lung     Sarcoidosis of lung     over 30 yrs ago    Seizures     7/2017    Suicide attempt     Suicide ideation      Past Surgical History:   Procedure Laterality Date    APPENDECTOMY      BILATERAL MASTECTOMY Bilateral 10/29/2020    Procedure: MASTECTOMY, BILATERAL;  Surgeon: Baylee Kevin MD;  Location: [facility] OR [address] [place] [address];  Service: General;  Laterality: Bilateral;    BREAST REVISION SURGERY Bilateral 2/11/2021    Procedure: BREAST REVISION SURGERY;  Surgeon: Scottie Johnson MD;  Location: [facility] OR [address] [place] [address];  Service: Plastics;  Laterality: Bilateral;    COLONOSCOPY N/A 7/28/2017    Procedure: COLONOSCOPY;  Surgeon: Aaron Alvarado MD;  Location: [facility];  Service: Endoscopy;  Laterality: N/A;    ESOPHAGOGASTRODUODENOSCOPY  10/7/2016, 11/6/2014    2016 - gastritis, duodenitis, 2014 erosive gastritis    ESOPHAGOGASTRODUODENOSCOPY N/A 2/11/2020    Procedure: ESOPHAGOGASTRODUODENOSCOPY (EGD);  Surgeon: Fawn Garrido MD;  Location: [facility];  Service: Endoscopy;  Laterality:  N/A;    ESOPHAGOGASTRODUODENOSCOPY N/A 4/19/2021    Procedure: EGD (ESOPHAGOGASTRODUODENOSCOPY);  Surgeon: Paramjit Martino MD;  Location: [facility];  Service: Endoscopy;  Laterality: N/A;    FLEXIBLE SIGMOIDOSCOPY  11/06/2014    colitis    HYSTERECTOMY      INJECTION FOR SENTINEL NODE IDENTIFICATION Right 10/29/2020    Procedure: INJECTION, FOR SENTINEL NODE IDENTIFICATION;  Surgeon: Baylee Kevin MD;  Location: [address];  Service: General;  Laterality: Right;    INJECTION OF JOINT Right 10/10/2019    Procedure: Injection, Joint RIGHT ILIOPSOAS BURSA/TENDON INJECTION AND RIGHT GLUTEAL TENDON INJECTION WITH STEROID AND LIDOCAINE;  Surgeon: Guillaume Rico MD;  Location: [facility];  Service: Pain Management;  Laterality: Right;  NEEDS CONSENT    INSERTION OF BREAST TISSUE EXPANDER Bilateral 10/29/2020    Procedure: INSERTION, TISSUE EXPANDER, BREAST;  Surgeon: Scottie Johnson MD;  Location: [address];  Service: Plastics;  Laterality: Bilateral;  Right breast: 1082 g  Left breast: 1076 g    LIPOSUCTION Bilateral 2/11/2021    Procedure: LIPOSUCTION;  Surgeon: Scottie Johnson MD;  Location: [address];  Service: Plastics;  Laterality: Bilateral;    mediastenoscopy      REPLACEMENT OF IMPLANT OF BREAST Bilateral 2/11/2021    Procedure: REPLACEMENT, IMPLANT, BREAST;  Surgeon: Scottie Johnson MD;  Location: [address];  Service: Plastics;  Laterality: Bilateral;    SENTINEL LYMPH NODE BIOPSY Right 10/29/2020    Procedure: BIOPSY, LYMPH NODE, SENTINEL;  Surgeon: Baylee Kevin MD;  Location: [address];  Service: General;  Laterality: Right;    TONSILLECTOMY N/A 1970    TUBAL LIGATION       Family History   Problem Relation Age of Onset    Heart attack Father     Diabetes Father     Hypertension Father     Diabetes Mother     Hypertension Mother     Breast cancer Maternal Aunt     Colon cancer Maternal Uncle     Breast cancer Daughter     Ovarian  cancer Neg Hx     Cancer Neg Hx      Social History     Tobacco Use    Smoking status: Current Every Day Smoker     Packs/day: 0.50     Years: 30.00     Pack years: 15.00     Types: Cigarettes     Last attempt to quit: 2021     Years since quittin.3    Smokeless tobacco: Never Used   Substance Use Topics    Alcohol use: Yes     Comment: daily     Drug use: Yes     Types: Marijuana     Comment: currently     Review of Systems    Physical Exam     Initial Vitals [06/10/22 0707]   BP Pulse Resp Temp SpO2   (!) 140/65 97 18 98.3 °F (36.8 °C) 95 %      MAP       --         Physical Exam    ED Course   Procedures  Labs Reviewed   CBC W/ AUTO DIFFERENTIAL - Abnormal; Notable for the following components:       Result Value    WBC 23.55 (*)     MCHC 30.8 (*)     RDW 18.6 (*)     Gran % 31.0 (*)     Lymph % 68.0 (*)     Mono % 1.0 (*)     All other components within normal limits   COMPREHENSIVE METABOLIC PANEL - Abnormal; Notable for the following components:    CO2 20 (*)     BUN 24 (*)     AST 55 (*)     ALT 54 (*)     Anion Gap 20 (*)     eGFR if non  53.2 (*)     All other components within normal limits   URINALYSIS, REFLEX TO URINE CULTURE - Abnormal; Notable for the following components:    Ketones, UA Trace (*)     Leukocytes, UA Trace (*)     All other components within normal limits    Narrative:     Specimen Source->Urine   SEDIMENTATION RATE   URINALYSIS MICROSCOPIC    Narrative:     Specimen Source->Urine   PATHOLOGIST REVIEW   SARS-COV-2 RDRP GENE          Imaging Results          X-Ray Chest AP Portable (Final result)  Result time 06/10/22 11:11:34    Final result by Columba Contreras MD (06/10/22 11:11:34)                 Impression:      New left basilar airspace disease, which could be secondary to atelectasis, aspiration or infection.      Electronically signed by: Columba Contreras  Date:    06/10/2022  Time:    11:11             Narrative:    EXAMINATION:  XR CHEST AP  PORTABLE    CLINICAL HISTORY:  headache;    TECHNIQUE:  Single frontal view of the chest was performed.    COMPARISON:  01/08/2022    FINDINGS:  There is left basilar airspace disease that is new when compared to prior imaging.  No pleural effusion.  Heart size is unchanged.                               MRI Brain Without Contrast (Final result)  Result time 06/10/22 09:45:14    Final result by Miguel Malagon MD (06/10/22 09:45:14)                 Impression:      No evidence of acute intracranial pathology.      Electronically signed by: Miguel Malagon MD  Date:    06/10/2022  Time:    09:45             Narrative:    EXAMINATION:  MRI BRAIN WITHOUT CONTRAST    CLINICAL HISTORY:  hx of PRES;    TECHNIQUE:  Multiplanar multisequence MR imaging of the brain was performed without intravenous contrast.    COMPARISON:  Head CT 06/10/2022, brain MRI 10/04/2017, 07/21/2017    FINDINGS:  Intracranial Compartment:    Ventricles are normal in size for age without evidence of hydrocephalus.    Ill-defined focus T2-FLAIR signal hyperintensity in the right periatrial white matter.  Few additional scattered punctate foci elsewhere within the supratentorial white matter.  These appear similar to the prior study of 10/04/2017.  No definite new focal lesions.  No diffusion restriction to indicate an acute infarction.  No remote major vascular distribution infarct.  No recent or remote hemorrhage.  No mass effect or midline shift.    No extra-axial blood or fluid collections.    Normal vascular flow voids are preserved.    Skull/Extracranial Contents (limited evaluation):    Bone marrow signal intensity is normal.                               CT Head Without Contrast (Final result)  Result time 06/10/22 08:36:08    Final result by Tyson Bob MD (06/10/22 08:36:08)                 Impression:      No acute intracranial process.      Electronically signed by: Tyson Bob  Date:    06/10/2022  Time:    08:36              Narrative:    EXAMINATION:  CT HEAD WITHOUT CONTRAST    CLINICAL HISTORY:  Headache, chronic, new features or increased frequency;    TECHNIQUE:  Low dose axial images were obtained through the head.  Coronal and sagittal reformations were also performed. Contrast was not administered.    COMPARISON:  MRI 10/04/2017 CT 07/20/2017    FINDINGS:  There is no evidence of hydrocephalus mass effect intracranial hemorrhage or acute territorial infarct.    The visualized sinuses and mastoid air cells are clear.                                 Medications   metoclopramide HCl injection 10 mg (10 mg Intravenous Given 6/10/22 0821)   sodium chloride 0.9% bolus 1,000 mL (0 mLs Intravenous Stopped 6/10/22 1132)   acetaminophen tablet 650 mg (650 mg Oral Given 6/10/22 0820)   diphenhydrAMINE injection 25 mg (25 mg Intravenous Given 6/10/22 1007)   cefTRIAXone (ROCEPHIN) 1 g/50 mL D5W IVPB (1 g Intravenous New Bag 6/10/22 1216)                          Clinical Impression:   Final diagnoses:  [R51.9] Nonintractable headache, unspecified chronicity pattern, unspecified headache type (Primary)  [J18.9] Pneumonia of left lower lobe due to infectious organism          ED Disposition Condition    Discharge Stable        ED Prescriptions     Medication Sig Dispense Start Date End Date Auth. Provider    azithromycin (Z-DENVER) 250 MG tablet  (Status: Discontinued) Take 1 tablet (250 mg total) by mouth once daily. Take first 2 tablets together, then 1 every day until finished. 6 tablet 6/10/2022 6/10/2022 Paris Weaver PA-C    azithromycin (Z-DENVER) 250 MG tablet Take 1 tablet (250 mg total) by mouth once daily. Take first 2 tablets together, then 1 every day until finished. 6 tablet 6/10/2022  Paris Weaver PA-C        Follow-up Information     Follow up With Specialties Details Why Contact Info    Andrew Rodriguez MD Internal Medicine Schedule an appointment as soon as possible for a visit   [address] [facility] [address]  [facility]  [address]  [phone]             Glory Lezama MD  06/10/22 8043

## 2022-06-16 ENCOUNTER — TELEPHONE (OUTPATIENT)
Dept: INTERNAL MEDICINE | Facility: CLINIC | Age: 62
End: 2022-06-16

## 2022-06-16 NOTE — PROGRESS NOTES
Subjective:       Patient ID: Earl Abdul is a 62 y.o. female.    Chief Complaint: Malignant neoplasm of central portion of right breast in fe    HPI 62-year-old female seen for F/U of right breast cancer.  She is a patient of Dr. Kevin. She was on letrozole but stopped that in May 2021.     She was recently diagnosed with CLL.     She had surgery this week for a nerve stimulator for bladder and bowel control. She is in pain from this. She has tramadol for this.      She has had a migraine for about 5 weeks, she was seen by a neurologist yesterday and was given steroids and medications.   She does not have a history of chronic migraines.    Decreased appetite.        She was hospitalized with COVID in January - also treated for ETOH withdawal.    Has chronic back pain sees pain management.      Per Dr. Ely's previous note: History:  Mammogram on September 4, 2020 showed a focal asymmetry in the retroareolar region of the right breast.  Ultrasound at that time showed a 9 x 5 x 8 mm hypoechoic mass at 12:00 p.m..    Biopsy on September 29, 2020 showed grade 2 infiltrating ductal carcinoma (histologic grade 3, nuclear grade 2, mitotic index 2).  Tumor was 95% ER positive 20-30% DE positive and HER2 was 2+ but negative by FISH.  Ki-67 was 80%.    On October 29, 2020 bilateral mastectomy and right axillary sentinel lymph node biopsy was performed.  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  Margins were negative with closest margin 6 mm.  The sentinel lymph node was negative.    Final pathological staging T1b N0 stage IA.    Letrozole started in February 2021.    Transitioned to anastrozole.     CT 3/25/22 -   The resolution of the prior described ground-glass nodules in the right middle and right upper lobes.     Interval development of small cluster pulmonary nodules within the superior lingular segment of the left upper lobe, perhaps leading to small airways disease.     Continued  demonstration of prominent mediastinal, axillary, and nolvia hepatic lymph nodes.        Review of Systems   Constitutional: Positive for appetite change (decreased) and fatigue. Negative for activity change, fever and unexpected weight change.   HENT: Negative for mouth sores.    Eyes: Negative for visual disturbance.   Respiratory: Negative for cough and shortness of breath.    Cardiovascular: Negative for chest pain.   Gastrointestinal: Negative for abdominal pain, constipation and diarrhea.   Genitourinary: Negative.  Negative for frequency.   Musculoskeletal: Positive for back pain (from surgery this week).   Integumentary:  Negative for rash.   Neurological: Positive for headaches (migraine).   Hematological: Negative for adenopathy.   Psychiatric/Behavioral: Negative for dysphoric mood. The patient is not nervous/anxious.          Objective:      Physical Exam  Vitals reviewed.   Constitutional:       General: She is not in acute distress.     Appearance: Normal appearance.   HENT:      Head: Normocephalic.   Eyes:      Pupils: Pupils are equal, round, and reactive to light.   Cardiovascular:      Rate and Rhythm: Normal rate and regular rhythm.   Pulmonary:      Effort: Pulmonary effort is normal. No respiratory distress.      Breath sounds: Normal breath sounds. No wheezing or rales.   Chest:   Breasts:      Right: No axillary adenopathy or supraclavicular adenopathy.      Left: No axillary adenopathy or supraclavicular adenopathy.         Abdominal:      Palpations: Abdomen is soft. There is no mass.      Tenderness: There is no abdominal tenderness.   Musculoskeletal:      Right lower leg: No edema.      Left lower leg: No edema.   Lymphadenopathy:      Cervical: No cervical adenopathy.      Upper Body:      Right upper body: No supraclavicular or axillary adenopathy.      Left upper body: No supraclavicular or axillary adenopathy.   Neurological:      Mental Status: She is alert and oriented to person,  place, and time.   Psychiatric:         Mood and Affect: Mood normal.         Thought Content: Thought content normal.         Judgment: Judgment normal.         Assessment:       1. Malignant neoplasm of central portion of right breast in female, estrogen receptor positive    2. Essential hypertension    3. Hyperlipidemia, unspecified hyperlipidemia type    4. Hypothyroidism, unspecified type    5. Type 2 diabetes mellitus with hyperglycemia, without long-term current use of insulin        Plan:       1. Continue anastrozole   - DXA due in 1 year last one showed normal bone density     2. Monitored today; continue current medication and follow up with PCP     3. Continue current medication and follow up with PCP    4. Continue current dose of synthroid and follow up with endocrinology     5. Continue current medications and follow up with endocrinology         Return to clinic in 3 months with MD appointment.     Patient is in agreement with the proposed treatment plan. All questions were answered to the patient's satisfaction. Patient knows to call clinic for any new or worsening symptoms and if anything is needed before the next clinic visit.          Nusrat Prabhakar, FNP-C  Hematology & Medical Oncology   [facility] [place] [address]  ph. [phone]  Fax. [phone]    Collaborating physician, Dr. Ely.    Approximately 20 minutes were spent face-to-face with the patient.  Approximately 30 minutes in total were spent on this encounter, which includes face-to-face time and non-face-to-face time preparing to see the patient (e.g., review of tests), obtaining and/or reviewing separately obtained history, documenting clinical information in the electronic or other health record, independently interpreting results (not separately reported) and communicating results to the patient/family/caregiver, or care coordination (not separately reported).     Route Chart for Scheduling    Med Onc Chart  Routing      Follow up with physician 3 months.   Follow up with TERESO    Infusion scheduling note    Injection scheduling note    Labs    Imaging    Pharmacy appointment    Other referrals

## 2022-06-17 ENCOUNTER — TELEPHONE (OUTPATIENT)
Dept: HEMATOLOGY/ONCOLOGY | Facility: CLINIC | Age: 62
End: 2022-06-17
Payer: COMMERCIAL

## 2022-06-22 ENCOUNTER — OFFICE VISIT (OUTPATIENT)
Dept: UROLOGY | Facility: CLINIC | Age: 62
End: 2022-06-22
Payer: COMMERCIAL

## 2022-06-22 ENCOUNTER — RESEARCH ENCOUNTER (OUTPATIENT)
Dept: RESEARCH | Facility: HOSPITAL | Age: 62
End: 2022-06-22
Payer: COMMERCIAL

## 2022-06-22 ENCOUNTER — RESEARCH ENCOUNTER (OUTPATIENT)
Dept: RESEARCH | Facility: HOSPITAL | Age: 62
End: 2022-06-22

## 2022-06-22 ENCOUNTER — OFFICE VISIT (OUTPATIENT)
Dept: HEMATOLOGY/ONCOLOGY | Facility: CLINIC | Age: 62
End: 2022-06-22
Payer: COMMERCIAL

## 2022-06-22 ENCOUNTER — LAB VISIT (OUTPATIENT)
Dept: LAB | Facility: HOSPITAL | Age: 62
End: 2022-06-22
Payer: COMMERCIAL

## 2022-06-22 VITALS
SYSTOLIC BLOOD PRESSURE: 92 MMHG | TEMPERATURE: 99 F | HEIGHT: 65 IN | RESPIRATION RATE: 16 BRPM | HEART RATE: 110 BPM | BODY MASS INDEX: 33.13 KG/M2 | WEIGHT: 198.88 LBS | DIASTOLIC BLOOD PRESSURE: 52 MMHG | OXYGEN SATURATION: 95 %

## 2022-06-22 DIAGNOSIS — R15.9 FULL INCONTINENCE OF FECES: ICD-10-CM

## 2022-06-22 DIAGNOSIS — C91.10 CLL (CHRONIC LYMPHOCYTIC LEUKEMIA): ICD-10-CM

## 2022-06-22 DIAGNOSIS — N32.81 OAB (OVERACTIVE BLADDER): Primary | ICD-10-CM

## 2022-06-22 DIAGNOSIS — Z17.0 MALIGNANT NEOPLASM OF CENTRAL PORTION OF RIGHT BREAST IN FEMALE, ESTROGEN RECEPTOR POSITIVE: ICD-10-CM

## 2022-06-22 DIAGNOSIS — C50.111 MALIGNANT NEOPLASM OF CENTRAL PORTION OF RIGHT BREAST IN FEMALE, ESTROGEN RECEPTOR POSITIVE: ICD-10-CM

## 2022-06-22 DIAGNOSIS — D72.820 LYMPHOCYTOSIS: ICD-10-CM

## 2022-06-22 DIAGNOSIS — C91.10 CLL (CHRONIC LYMPHOCYTIC LEUKEMIA): Primary | ICD-10-CM

## 2022-06-22 LAB
ALBUMIN SERPL BCP-MCNC: 4.1 G/DL (ref 3.5–5.2)
ALP SERPL-CCNC: 55 U/L (ref 55–135)
ALT SERPL W/O P-5'-P-CCNC: 72 U/L (ref 10–44)
ANION GAP SERPL CALC-SCNC: 13 MMOL/L (ref 8–16)
AST SERPL-CCNC: 105 U/L (ref 10–40)
BASOPHILS # BLD AUTO: 0.04 K/UL (ref 0–0.2)
BASOPHILS NFR BLD: 0.5 % (ref 0–1.9)
BILIRUB SERPL-MCNC: 0.4 MG/DL (ref 0.1–1)
BUN SERPL-MCNC: 5 MG/DL (ref 8–23)
CALCIUM SERPL-MCNC: 9.3 MG/DL (ref 8.7–10.5)
CHLORIDE SERPL-SCNC: 102 MMOL/L (ref 95–110)
CO2 SERPL-SCNC: 25 MMOL/L (ref 23–29)
CREAT SERPL-MCNC: 0.9 MG/DL (ref 0.5–1.4)
DIFFERENTIAL METHOD: ABNORMAL
EOSINOPHIL # BLD AUTO: 0.1 K/UL (ref 0–0.5)
EOSINOPHIL NFR BLD: 0.7 % (ref 0–8)
ERYTHROCYTE [DISTWIDTH] IN BLOOD BY AUTOMATED COUNT: 18.9 % (ref 11.5–14.5)
EST. GFR  (AFRICAN AMERICAN): >60 ML/MIN/1.73 M^2
EST. GFR  (NON AFRICAN AMERICAN): >60 ML/MIN/1.73 M^2
GLUCOSE SERPL-MCNC: 97 MG/DL (ref 70–110)
HCT VFR BLD AUTO: 35.8 % (ref 37–48.5)
HGB BLD-MCNC: 10.8 G/DL (ref 12–16)
IMM GRANULOCYTES # BLD AUTO: 0.05 K/UL (ref 0–0.04)
IMM GRANULOCYTES NFR BLD AUTO: 0.7 % (ref 0–0.5)
LYMPHOCYTES # BLD AUTO: 4.5 K/UL (ref 1–4.8)
LYMPHOCYTES NFR BLD: 61 % (ref 18–48)
MCH RBC QN AUTO: 29.9 PG (ref 27–31)
MCHC RBC AUTO-ENTMCNC: 30.2 G/DL (ref 32–36)
MCV RBC AUTO: 99 FL (ref 82–98)
MONOCYTES # BLD AUTO: 0.5 K/UL (ref 0.3–1)
MONOCYTES NFR BLD: 7.1 % (ref 4–15)
NEUTROPHILS # BLD AUTO: 2.2 K/UL (ref 1.8–7.7)
NEUTROPHILS NFR BLD: 30 % (ref 38–73)
NRBC BLD-RTO: 0 /100 WBC
PLATELET # BLD AUTO: 113 K/UL (ref 150–450)
PMV BLD AUTO: 10.8 FL (ref 9.2–12.9)
POTASSIUM SERPL-SCNC: 3.5 MMOL/L (ref 3.5–5.1)
PROT SERPL-MCNC: 7 G/DL (ref 6–8.4)
RBC # BLD AUTO: 3.61 M/UL (ref 4–5.4)
SODIUM SERPL-SCNC: 140 MMOL/L (ref 136–145)
WBC # BLD AUTO: 7.35 K/UL (ref 3.9–12.7)

## 2022-06-22 PROCEDURE — 88184 FLOWCYTOMETRY/ TC 1 MARKER: CPT | Performed by: PATHOLOGY

## 2022-06-22 PROCEDURE — 88185 FLOWCYTOMETRY/TC ADD-ON: CPT | Mod: 59 | Performed by: PATHOLOGY

## 2022-06-22 PROCEDURE — 3074F SYST BP LT 130 MM HG: CPT | Mod: CPTII,S$GLB,, | Performed by: INTERNAL MEDICINE

## 2022-06-22 PROCEDURE — 99999 PR PBB SHADOW E&M-EST. PATIENT-LVL V: CPT | Mod: PBBFAC,,, | Performed by: INTERNAL MEDICINE

## 2022-06-22 PROCEDURE — 3074F PR MOST RECENT SYSTOLIC BLOOD PRESSURE < 130 MM HG: ICD-10-PCS | Mod: CPTII,S$GLB,, | Performed by: INTERNAL MEDICINE

## 2022-06-22 PROCEDURE — 4010F ACE/ARB THERAPY RXD/TAKEN: CPT | Mod: CPTII,S$GLB,, | Performed by: UROLOGY

## 2022-06-22 PROCEDURE — 3061F NEG MICROALBUMINURIA REV: CPT | Mod: CPTII,S$GLB,, | Performed by: UROLOGY

## 2022-06-22 PROCEDURE — 99214 OFFICE O/P EST MOD 30 MIN: CPT | Mod: S$GLB,,, | Performed by: UROLOGY

## 2022-06-22 PROCEDURE — 3044F HG A1C LEVEL LT 7.0%: CPT | Mod: CPTII,S$GLB,, | Performed by: INTERNAL MEDICINE

## 2022-06-22 PROCEDURE — 88189 PR  FLOWCYTOMETRY/READ, 16 & > MARKERS: ICD-10-PCS | Mod: ,,, | Performed by: PATHOLOGY

## 2022-06-22 PROCEDURE — 1160F PR REVIEW ALL MEDS BY PRESCRIBER/CLIN PHARMACIST DOCUMENTED: ICD-10-PCS | Mod: CPTII,S$GLB,, | Performed by: UROLOGY

## 2022-06-22 PROCEDURE — 99214 PR OFFICE/OUTPT VISIT, EST, LEVL IV, 30-39 MIN: ICD-10-PCS | Mod: S$GLB,,, | Performed by: UROLOGY

## 2022-06-22 PROCEDURE — 1159F MED LIST DOCD IN RCRD: CPT | Mod: CPTII,S$GLB,, | Performed by: INTERNAL MEDICINE

## 2022-06-22 PROCEDURE — 99215 PR OFFICE/OUTPT VISIT, EST, LEVL V, 40-54 MIN: ICD-10-PCS | Mod: S$GLB,,, | Performed by: INTERNAL MEDICINE

## 2022-06-22 PROCEDURE — 3066F NEPHROPATHY DOC TX: CPT | Mod: CPTII,S$GLB,, | Performed by: INTERNAL MEDICINE

## 2022-06-22 PROCEDURE — 88189 FLOWCYTOMETRY/READ 16 & >: CPT | Mod: ,,, | Performed by: PATHOLOGY

## 2022-06-22 PROCEDURE — 1160F RVW MEDS BY RX/DR IN RCRD: CPT | Mod: CPTII,S$GLB,, | Performed by: UROLOGY

## 2022-06-22 PROCEDURE — 87081 CULTURE SCREEN ONLY: CPT | Performed by: UROLOGY

## 2022-06-22 PROCEDURE — 99417 PR PROLONGED SVC, OUTPT, W/WO DIRECT PT CONTACT,  EA ADDTL 15 MIN: ICD-10-PCS | Mod: S$GLB,,, | Performed by: INTERNAL MEDICINE

## 2022-06-22 PROCEDURE — 3078F DIAST BP <80 MM HG: CPT | Mod: CPTII,S$GLB,, | Performed by: INTERNAL MEDICINE

## 2022-06-22 PROCEDURE — 3008F BODY MASS INDEX DOCD: CPT | Mod: CPTII,S$GLB,, | Performed by: INTERNAL MEDICINE

## 2022-06-22 PROCEDURE — 1159F MED LIST DOCD IN RCRD: CPT | Mod: CPTII,S$GLB,, | Performed by: UROLOGY

## 2022-06-22 PROCEDURE — 4010F PR ACE/ARB THEARPY RXD/TAKEN: ICD-10-PCS | Mod: CPTII,S$GLB,, | Performed by: UROLOGY

## 2022-06-22 PROCEDURE — 3044F PR MOST RECENT HEMOGLOBIN A1C LEVEL <7.0%: ICD-10-PCS | Mod: CPTII,S$GLB,, | Performed by: UROLOGY

## 2022-06-22 PROCEDURE — 3061F PR NEG MICROALBUMINURIA RESULT DOCUMENTED/REVIEW: ICD-10-PCS | Mod: CPTII,S$GLB,, | Performed by: INTERNAL MEDICINE

## 2022-06-22 PROCEDURE — 3066F PR DOCUMENTATION OF TREATMENT FOR NEPHROPATHY: ICD-10-PCS | Mod: CPTII,S$GLB,, | Performed by: UROLOGY

## 2022-06-22 PROCEDURE — 3008F PR BODY MASS INDEX (BMI) DOCUMENTED: ICD-10-PCS | Mod: CPTII,S$GLB,, | Performed by: INTERNAL MEDICINE

## 2022-06-22 PROCEDURE — 88275 CYTOGENETICS 100-300: CPT | Performed by: INTERNAL MEDICINE

## 2022-06-22 PROCEDURE — 3044F PR MOST RECENT HEMOGLOBIN A1C LEVEL <7.0%: ICD-10-PCS | Mod: CPTII,S$GLB,, | Performed by: INTERNAL MEDICINE

## 2022-06-22 PROCEDURE — 1159F PR MEDICATION LIST DOCUMENTED IN MEDICAL RECORD: ICD-10-PCS | Mod: CPTII,S$GLB,, | Performed by: UROLOGY

## 2022-06-22 PROCEDURE — 99999 PR PBB SHADOW E&M-EST. PATIENT-LVL II: CPT | Mod: PBBFAC,,, | Performed by: UROLOGY

## 2022-06-22 PROCEDURE — 4010F ACE/ARB THERAPY RXD/TAKEN: CPT | Mod: CPTII,S$GLB,, | Performed by: INTERNAL MEDICINE

## 2022-06-22 PROCEDURE — 4010F PR ACE/ARB THEARPY RXD/TAKEN: ICD-10-PCS | Mod: CPTII,S$GLB,, | Performed by: INTERNAL MEDICINE

## 2022-06-22 PROCEDURE — 85025 COMPLETE CBC W/AUTO DIFF WBC: CPT | Performed by: INTERNAL MEDICINE

## 2022-06-22 PROCEDURE — 99417 PROLNG OP E/M EACH 15 MIN: CPT | Mod: S$GLB,,, | Performed by: INTERNAL MEDICINE

## 2022-06-22 PROCEDURE — 80053 COMPREHEN METABOLIC PANEL: CPT | Performed by: INTERNAL MEDICINE

## 2022-06-22 PROCEDURE — 3044F HG A1C LEVEL LT 7.0%: CPT | Mod: CPTII,S$GLB,, | Performed by: UROLOGY

## 2022-06-22 PROCEDURE — 3066F NEPHROPATHY DOC TX: CPT | Mod: CPTII,S$GLB,, | Performed by: UROLOGY

## 2022-06-22 PROCEDURE — 99215 OFFICE O/P EST HI 40 MIN: CPT | Mod: S$GLB,,, | Performed by: INTERNAL MEDICINE

## 2022-06-22 PROCEDURE — 3066F PR DOCUMENTATION OF TREATMENT FOR NEPHROPATHY: ICD-10-PCS | Mod: CPTII,S$GLB,, | Performed by: INTERNAL MEDICINE

## 2022-06-22 PROCEDURE — 1159F PR MEDICATION LIST DOCUMENTED IN MEDICAL RECORD: ICD-10-PCS | Mod: CPTII,S$GLB,, | Performed by: INTERNAL MEDICINE

## 2022-06-22 PROCEDURE — 3061F PR NEG MICROALBUMINURIA RESULT DOCUMENTED/REVIEW: ICD-10-PCS | Mod: CPTII,S$GLB,, | Performed by: UROLOGY

## 2022-06-22 PROCEDURE — 3078F PR MOST RECENT DIASTOLIC BLOOD PRESSURE < 80 MM HG: ICD-10-PCS | Mod: CPTII,S$GLB,, | Performed by: INTERNAL MEDICINE

## 2022-06-22 PROCEDURE — 3061F NEG MICROALBUMINURIA REV: CPT | Mod: CPTII,S$GLB,, | Performed by: INTERNAL MEDICINE

## 2022-06-22 PROCEDURE — 99999 PR PBB SHADOW E&M-EST. PATIENT-LVL V: ICD-10-PCS | Mod: PBBFAC,,, | Performed by: INTERNAL MEDICINE

## 2022-06-22 PROCEDURE — 99999 PR PBB SHADOW E&M-EST. PATIENT-LVL II: ICD-10-PCS | Mod: PBBFAC,,, | Performed by: UROLOGY

## 2022-06-22 NOTE — PROGRESS NOTES
[employer] [id number]  IRB# [id number]  PI: Dr. Edwards  Subject number: [id number]     Date: 22-Jun-22     Baseline visit:      Patient was consented today in clinic.     I. Patient's eligibility was reviewed      II. Demographics  1. Age: 62  2. Sex: Female   3. Race: white     II. year of diagnosis 2012  Primary diagnosis- UUI  Secondary Diagnosis-REDD , FI and UF      III. Vital Signs Date of collection 22-Jun 2022  Weight- 89.6 kgs  Height- 167.6 cms     III. Medical History   Has the subject ever been pregnant- yes  If Yes, number of vaginal sections- 2  2. Menopausal status- post menopausal   3. History or presence of cardiac issues- no  4. History or presence of chronic urinary tract infections- no  5. History or presence of constipation- no  6. History or presence of depression/ anxiety- yes   7. History or presence of diabetes mellitus- yes  8. History or presence of diarrhea - yes  9. History or presence of falls and/or fractures- no  10. History or presence of hypertension -yes  12. History or presence of inflammatory bowel disease- no  13. History or presence of internal and/or external anal sphincter defects- no  14. History or presence of irritable bowel syndrome- yes  15. History or presence of low anterior resection syndrome- no  16. History or presence of pelvic and/or rectal pain- no  17. History or presence of pelvic floor surgery-no  18. History of treatments   Sacral neuromodulation- no  PTNM/PTNS- no  Botox- no  Cohort specific medications- yes- patient to stop myrbetriq  today     IV. Questionnaires completed:          1. CALVIN          2. OAB-q     8oz caffeinated beverages was recorded- 0     VI. Diary Instructions and distribution  Subject was provided with IRB approved OAB cohort diary booklet ans was provided training on the following:   Diary instructions  Diary definitions  GCP practices were reviewed with the subject  Patient was provided with a paper diary and fed ex envelope. She will  do her 3 diary. She will bring her diary back on the day of her surgery.  Study coordinators contact information was provided should any questions arise.

## 2022-06-22 NOTE — PROGRESS NOTES
[facility]        Overactive Bladder (OAB) Return Note     6/3/2022     Referred by:  Kalin Desir MD     [facility]: Earl Abdul is a very pleasant 62 y.o. female who is a return patient to our department referred for evaluation of urinary incontinence and fecal incontinence of several years duration. She has already completed a voiding diary which was reviewed today. She has previously seen Dr. Umaña back in December.  She reports bother is associated with urinary daytime frequency (11-14x daily), with nocturia (3-4x per night) and with urgency that results in urinary incontinence 5 x daily. She reports some stress urinary incontinence which is less bothersome to her than her urgency incontinence. She requires adult briefs (5 briefs/day).  She has decreased overall fluid intake.  She reports urinary incontinence is day and night but more predominant during the day. Patient reports urgency is independent of position.      She is also bothered by fecal incontinence, having up to 4 fecal incontinence episodes per day. She has tried pelvic floor exercises, dietary changes, fiber bulking and immodium for her fecal incontinence.      She denies symptoms of irritative voiding including dysuria. She denies symptoms of obstructive voiding including decreased stream, hesitancy, intermittency, post void dribbling and sense of incomplete emptying. Bladder scan PVR was 0 mL. Her history includes no notation of urolithiasis, hematuria, prior pelvic surgery, previous prolapse or incontinence procedures or neurological symptoms/diagnoses. She denies all other prior pelvic surgeries or neurologic diagnoses. She does not report symptoms suggestive of advanced POP. She denies a history of constipation.      A review of 10+ systems was conducted with pertinent positive and negative findings documented in HPI with all other systems reviewed and negative.     Past medical, family, surgical and social history  reviewed as documented in chart with pertinent positive medical, family, surgical and social history detailed in HPI.     Exam Findings:     Const: no acute distress, conversant and alert  Eyes: anicteric, extraocular muscles intact  ENMT: normocephalic, Nl oral membranes  Cardio: no cyanosis, nl cap refill  Pulm: no tachypnea; no resp distress  Abd: soft, no tenderness  Musc: no laceration, no tenderness  Neuro: alert; oriented x 3  Skin: warm, dry; no petichiae  Psych: no anxiety; normal speech      Assessment/Plan:     · Overactive Bladder with Urgency Incontinence (new, addt'l workup): Her history is suggestive of Overactive Bladder (OAB) with predominantly Urgency Urinary Incontinence (UUI). These findings have been confirmed by the completed 3-day voiding diary that she returned today. She has up to 5 urgency incontinence episodes per day. Given the concomitant fecal incontinence which is refractory to more conservative therapy, she would like to proceed with staged testing of SNM for her urinary incontinence.         · Fecal Incontinence with fecal urgency (new, addt'l workup): She has failed to see satisfactory improvement with numerous more conservative therapies as detailed above. She continues to have up to 4 fecal incontinence episodes per day with fecal urgency with each of these. She would like to proceed with staged testing of SNM for treatment of refractory fecal incontinence.      This patient will be scheduled for staged implantation of lead (CPT 65187) as a 1-2 week test of whether there is sufficient response to justify implantation of a permanent generator (CPT 64031). Please note that the placement of a staged  shares the same CPT code (97381) as placement of a permanent lead and generator (additional CPT 11358) after a successful basic evaluation test . This patient must demonstrate at least 50% improvement in symptoms to proceed to generator implantation, which is the purpose of this  test. Clinical data to support the placement of the patient generator (CPT 74056) can only be supplied after placement of the lead (CPT 05688) in this testing scenario.

## 2022-06-22 NOTE — H&P (VIEW-ONLY)
[facility]        Overactive Bladder (OAB) Return Note     6/3/2022     Referred by:  Kalin Desir MD     [facility] [place]: Earl Abdul is a very pleasant 62 y.o. female who is a return patient to our department referred for evaluation of urinary incontinence and fecal incontinence of several years duration. She has already completed a voiding diary which was reviewed today. She has previously seen Dr. Umaña back in December.  She reports bother is associated with urinary daytime frequency (11-14x daily), with nocturia (3-4x per night) and with urgency that results in urinary incontinence 5 x daily. She reports some stress urinary incontinence which is less bothersome to her than her urgency incontinence. She requires adult briefs (5 briefs/day).  She has decreased overall fluid intake.  She reports urinary incontinence is day and night but more predominant during the day. Patient reports urgency is independent of position.      She is also bothered by fecal incontinence, having up to 4 fecal incontinence episodes per day. She has tried pelvic floor exercises, dietary changes, fiber bulking and immodium for her fecal incontinence.      She denies symptoms of irritative voiding including dysuria. She denies symptoms of obstructive voiding including decreased stream, hesitancy, intermittency, post void dribbling and sense of incomplete emptying. Bladder scan PVR was 0 mL. Her history includes no notation of urolithiasis, hematuria, prior pelvic surgery, previous prolapse or incontinence procedures or neurological symptoms/diagnoses. She denies all other prior pelvic surgeries or neurologic diagnoses. She does not report symptoms suggestive of advanced POP. She denies a history of constipation.      A review of 10+ systems was conducted with pertinent positive and negative findings documented in HPI with all other systems reviewed and negative.     Past medical, family, surgical and social history  reviewed as documented in chart with pertinent positive medical, family, surgical and social history detailed in HPI.     Exam Findings:     Const: no acute distress, conversant and alert  Eyes: anicteric, extraocular muscles intact  ENMT: normocephalic, Nl oral membranes  Cardio: no cyanosis, nl cap refill  Pulm: no tachypnea; no resp distress  Abd: soft, no tenderness  Musc: no laceration, no tenderness  Neuro: alert; oriented x 3  Skin: warm, dry; no petichiae  Psych: no anxiety; normal speech      Assessment/Plan:     · Overactive Bladder with Urgency Incontinence (new, addt'l workup): Her history is suggestive of Overactive Bladder (OAB) with predominantly Urgency Urinary Incontinence (UUI). These findings have been confirmed by the completed 3-day voiding diary that she returned today. She has up to 5 urgency incontinence episodes per day. Given the concomitant fecal incontinence which is refractory to more conservative therapy, she would like to proceed with staged testing of SNM for her urinary incontinence.         · Fecal Incontinence with fecal urgency (new, addt'l workup): She has failed to see satisfactory improvement with numerous more conservative therapies as detailed above. She continues to have up to 4 fecal incontinence episodes per day with fecal urgency with each of these. She would like to proceed with staged testing of SNM for treatment of refractory fecal incontinence.      This patient will be scheduled for staged implantation of lead (CPT 77604) as a 1-2 week test of whether there is sufficient response to justify implantation of a permanent generator (CPT 28024). Please note that the placement of a staged  shares the same CPT code (58679) as placement of a permanent lead and generator (additional CPT 55976) after a successful basic evaluation test . This patient must demonstrate at least 50% improvement in symptoms to proceed to generator implantation, which is the purpose of this  test. Clinical data to support the placement of the patient generator (CPT 79162) can only be supplied after placement of the lead (CPT 58231) in this testing scenario.

## 2022-06-22 NOTE — PROGRESS NOTES
[employer] PEER2 Study  IRB# [id number]  PI: Dr. Juaquin Edwards  Subject Number: [id number]     Date: 22-jun-2022     ICF version (date of IRB approval stamp): 19-Nov-2021   Clinical Investigation Plan version: 2.0  Protocol version for cohort: 1.0     The patient was consented for the [employer] PEER2 study in clinic today. Patient was not accompanied by anyone.     The patient read through the consent form and the  went through it with them. The purpose of the study, length of the study, study visit and procedures, risks, benefits, responsibilities, alternative treatments, costs, compensation for injury, withdrawal notices, HIPAA authorization were fully discussed.      Patient was given ample time to ask questions. All questions were answered and the patient agreed to participate in the study. Patient then signed the consent form. A copy of the signed consent was given to the patient for their records and a copy has been scanned in to Epic, see Media tab. No study related procedures were performed prior to consent.

## 2022-06-22 NOTE — PROGRESS NOTES
Hematology and Medical Oncology   New Patient Consult     06/22/2022  Referred by:  Dr. Andrew Rodriguez    Reason For Referral: Question of CLL?    History of Present Ilness:   Earl Abdul (Earl) is a pleasant 62 y.o.female who presents with a 3 week history of constant migraine [bothered by light], diarrhea and vomiting. Recently in [place] and spent several days in the hospital. Treated for walking pneumonia while there.     Sharita 10, 2022 CBC revealed a white count of 22.5, all other reads have been in the normal range.    Flow cytometry of blood:   Immunophenotyping of peripheral blood detects a distinct kappa light chain   restricted monoclonal B-cell population  (calculated at 2.44x10   9 /L, from the most recent CBC showing a total WBC of  7.35 K/uL with 61%   total lymphocytes)  with a CLL phenotype (coexpression of CD19, CD5, CD23 and   dim CD20). CD22 (FITC), FMC-7 and CD38 are negative in this population.   Without associated lymphadenopathy, organomegaly, other extramedullary   involvement, or any other features of a B-cell lymphoproliferative disorder,   a monoclonal B-cell count <5x10 9 /L in the peripheral blood should be classified as monoclonal B-cell lymphocytosis (MBL) (WHO Classification of Tumors of Hematopoietic and Lymphoid Tissues, 2017).      PAST MEDICAL HISTORY:   Past Medical History:   Diagnosis Date    Anemia     Anemia     Controlled type 2 diabetes mellitus without complication, without long-term current use of insulin 11/30/2021    COPD (chronic obstructive pulmonary disease)     Depression     Diverticulitis     Fatty liver     GERD (gastroesophageal reflux disease)     Hyperlipidemia     Hypertension     Pancreatitis     Peptic ulcer disease     Polysubstance abuse     Posterior reversible encephalopathy syndrome     Sarcoidosis of lung     Sarcoidosis of lung     over 30 yrs ago    Seizures     7/2017    Suicide attempt     Suicide ideation        PAST  SURGICAL HISTORY:   Past Surgical History:   Procedure Laterality Date    APPENDECTOMY      BILATERAL MASTECTOMY Bilateral 10/29/2020    Procedure: MASTECTOMY, BILATERAL;  Surgeon: Baylee Kevin MD;  Location: [address];  Service: General;  Laterality: Bilateral;    BREAST REVISION SURGERY Bilateral 2/11/2021    Procedure: BREAST REVISION SURGERY;  Surgeon: Scottie Johnson MD;  Location: [address];  Service: Plastics;  Laterality: Bilateral;    COLONOSCOPY N/A 7/28/2017    Procedure: COLONOSCOPY;  Surgeon: Aaron Alvarado MD;  Location: [facility];  Service: Endoscopy;  Laterality: N/A;    ESOPHAGOGASTRODUODENOSCOPY  10/7/2016, 11/6/2014    2016 - gastritis, duodenitis, 2014 erosive gastritis    ESOPHAGOGASTRODUODENOSCOPY N/A 2/11/2020    Procedure: ESOPHAGOGASTRODUODENOSCOPY (EGD);  Surgeon: Fawn Garrido MD;  Location: [facility];  Service: Endoscopy;  Laterality: N/A;    ESOPHAGOGASTRODUODENOSCOPY N/A 4/19/2021    Procedure: EGD (ESOPHAGOGASTRODUODENOSCOPY);  Surgeon: Paramjit Martino MD;  Location: [facility];  Service: Endoscopy;  Laterality: N/A;    FLEXIBLE SIGMOIDOSCOPY  11/06/2014    colitis    HYSTERECTOMY      INJECTION FOR SENTINEL NODE IDENTIFICATION Right 10/29/2020    Procedure: INJECTION, FOR SENTINEL NODE IDENTIFICATION;  Surgeon: Baylee Kevin MD;  Location: [address];  Service: General;  Laterality: Right;    INJECTION OF JOINT Right 10/10/2019    Procedure: Injection, Joint RIGHT ILIOPSOAS BURSA/TENDON INJECTION AND RIGHT GLUTEAL TENDON INJECTION WITH STEROID AND LIDOCAINE;  Surgeon: Guillaume Rico MD;  Location: [facility];  Service: Pain Management;  Laterality: Right;  NEEDS CONSENT    INSERTION OF BREAST TISSUE EXPANDER Bilateral 10/29/2020    Procedure: INSERTION, TISSUE EXPANDER, BREAST;  Surgeon: Scottie Johnson MD;  Location: [address];  Service: Plastics;  Laterality: Bilateral;  Right breast: 1082 g  Left breast: 1076 g     LIPOSUCTION Bilateral 2/11/2021    Procedure: LIPOSUCTION;  Surgeon: Scottie Johnson MD;  [facility];  Service: Plastics;  Laterality: Bilateral;    mediastenoscopy      REPLACEMENT OF IMPLANT OF BREAST Bilateral 2/11/2021    Procedure: REPLACEMENT, IMPLANT, BREAST;  Surgeon: Scottie Johnson MD;  Location: [facility]ND [facility];  Service: Plastics;  Laterality: Bilateral;    SENTINEL LYMPH NODE BIOPSY Right 10/29/2020    Procedure: BIOPSY, LYMPH NODE, SENTINEL;  Surgeon: Baylee Kevin MD;  [facility];  Service: General;  Laterality: Right;    TONSILLECTOMY N/A 1970    TUBAL LIGATION         PAST SOCIAL HISTORY:   reports that she has been smoking cigarettes. She has a 15.00 pack-year smoking history. She has never used smokeless tobacco. She reports current alcohol use. She reports current drug use. Drug: Marijuana.    FAMILY HISTORY:  Family History   Problem Relation Age of Onset    Heart attack Father     Diabetes Father     Hypertension Father     Diabetes Mother     Hypertension Mother     Breast cancer Maternal Aunt     Colon cancer Maternal Uncle     Breast cancer Daughter     Ovarian cancer Neg Hx     Cancer Neg Hx        CURRENT MEDICATIONS:   Current Outpatient Medications   Medication Sig    anastrozole (ARIMIDEX) 1 mg Tab Take 1 tablet (1 mg total) by mouth once daily.    ARIPiprazole (ABILIFY) 10 MG Tab Take 10 mg by mouth once daily.    atorvastatin (LIPITOR) 10 MG tablet Take 10 mg by mouth once daily.    ATROVENT HFA 17 mcg/actuation inhaler Inhale 2 puffs into the lungs every 6 (six) hours as needed for Wheezing.    azithromycin (Z-DENVER) 250 MG tablet Take 1 tablet (250 mg total) by mouth once daily. Take first 2 tablets together, then 1 every day until finished.    DULoxetine (CYMBALTA) 60 MG capsule Take 60 mg by mouth once daily.    gabapentin (NEURONTIN) 600 MG tablet Take 1 tablet (600 mg total) by mouth 2 (two) times daily.     levothyroxine (SYNTHROID) 50 MCG tablet Take 1 tablet (50 mcg total) by mouth before breakfast.    lisinopriL (PRINIVIL,ZESTRIL) 20 MG tablet Take 1 tablet (20 mg total) by mouth once daily.    metFORMIN (GLUCOPHAGE-XR) 500 MG ER 24hr tablet Take 2 tablets (1,000 mg total) by mouth 2 (two) times daily with meals.    methylPREDNISolone (MEDROL DOSEPACK) 4 mg tablet use as directed    metoprolol succinate (TOPROL-XL) 25 MG 24 hr tablet Take 25 mg by mouth once daily.    omega-3 fatty acids 1,000 mg Cap Take 1 capsule by mouth once daily.    ondansetron (ZOFRAN-ODT) 4 MG TbDL Take 2 tablets (8 mg total) by mouth 2 (two) times daily.    pantoprazole (PROTONIX) 40 MG tablet Take 1 tablet (40 mg total) by mouth once daily.    semaglutide (OZEMPIC) 0.25 mg or 0.5 mg(2 mg/1.5 mL) pen injector Inject 0.5 mg into the skin every 7 days. Start with 0.25 mg weekly for 4 weeks and then increase to 0.5 mg if you are tolerating this dose    sumatriptan (IMITREX) 100 MG tablet 0.5-1 tablet earliest onest of migraine. May take addntl dose 2hrs if HA continues. Max 200mg/24hrs. Max 4 days/month    tiotropium-olodateroL (STIOLTO RESPIMAT) 2.5-2.5 mcg/actuation Mist Inhale 1 puff into the lungs 2 (two) times a day. Controller    traZODone (DESYREL) 300 MG tablet Take 300 mg by mouth nightly.     No current facility-administered medications for this visit.     Facility-Administered Medications Ordered in Other Visits   Medication    albuterol sulfate nebulizer solution 2.5 mg     ALLERGIES:   Review of patient's allergies indicates:   Allergen Reactions    Lortab  [hydrocodone-acetaminophen] Itching    Promethazine Other (See Comments) and Itching     Other reaction(s): Itching         Review of Systems:     Review of Systems   Constitutional: Positive for appetite change, fatigue and unexpected weight change. Negative for chills, diaphoresis and fever.   HENT:   Negative for hearing loss, mouth sores, nosebleeds, sore  throat, trouble swallowing and voice change.    Eyes: Negative for eye problems and icterus.   Respiratory: Positive for cough and shortness of breath. Negative for chest tightness, hemoptysis and wheezing.    Cardiovascular: Negative for chest pain, leg swelling and palpitations.   Gastrointestinal: Positive for diarrhea, nausea and vomiting. Negative for abdominal distention, abdominal pain and blood in stool.   Endocrine: Negative for hot flashes.   Genitourinary: Negative for bladder incontinence, difficulty urinating, dysuria and hematuria.    Musculoskeletal: Negative for arthralgias, back pain, flank pain, gait problem, myalgias, neck pain and neck stiffness.   Skin: Negative for itching, rash and wound.   Neurological: Positive for headaches. Negative for dizziness, extremity weakness, gait problem, numbness, seizures and speech difficulty.   Hematological: Negative for adenopathy. Does not bruise/bleed easily.   Psychiatric/Behavioral: Negative for confusion, depression and sleep disturbance. The patient is not nervous/anxious.           Physical Exam:     Vitals:    06/22/22 1408   BP: (!) 92/52   Pulse: 110   Resp: 16   Temp: 98.9 °F (37.2 °C)     Physical Exam  Constitutional:       General: She is not in acute distress.     Appearance: She is well-developed. She is not diaphoretic.      Comments: Ill appearing   HENT:      Head: Normocephalic and atraumatic.      Mouth/Throat:      Pharynx: No oropharyngeal exudate.   Eyes:      Conjunctiva/sclera: Conjunctivae normal.      Pupils: Pupils are equal, round, and reactive to light.   Neck:      Thyroid: No thyromegaly.      Vascular: No JVD.      Trachea: No tracheal deviation.   Cardiovascular:      Rate and Rhythm: Normal rate and regular rhythm.      Heart sounds: Normal heart sounds. No murmur heard.    No friction rub.   Pulmonary:      Effort: Pulmonary effort is normal. No respiratory distress.      Breath sounds: Normal breath sounds. No stridor.  No wheezing or rales.   Chest:      Chest wall: No tenderness.   Abdominal:      General: Bowel sounds are normal. There is no distension.      Palpations: Abdomen is soft.      Tenderness: There is no abdominal tenderness. There is no guarding or rebound.   Musculoskeletal:         General: No tenderness or deformity. Normal range of motion.      Cervical back: Normal range of motion and neck supple.   Skin:     General: Skin is warm and dry.      Capillary Refill: Capillary refill takes less than 2 seconds.      Coloration: Skin is not pale.      Findings: No erythema or rash.   Neurological:      Mental Status: She is alert and oriented to person, place, and time.      Cranial Nerves: No cranial nerve deficit.      Sensory: No sensory deficit.      Motor: No abnormal muscle tone.      Coordination: Coordination normal.      Deep Tendon Reflexes: Reflexes normal.   Psychiatric:         Behavior: Behavior normal.         Thought Content: Thought content normal.         Judgment: Judgment normal.         ECOG Performance Status: (foot note - ECOG PS provided by Eastern Cooperative Oncology Group) 2 - Symptomatic, <50% confined to bed    Karnofsky Performance Score:  80%- Normal Activity with Effort: Some Symptoms of Disease    Labs:   Lab Results   Component Value Date    WBC 7.35 06/22/2022    HGB 10.8 (L) 06/22/2022    HCT 35.8 (L) 06/22/2022     (L) 06/22/2022    CHOL 163 03/10/2022    TRIG 189 (H) 03/10/2022    HDL 47 03/10/2022    ALT 72 (H) 06/22/2022     (H) 06/22/2022     06/22/2022    K 3.5 06/22/2022     06/22/2022    CREATININE 0.9 06/22/2022    BUN 5 (L) 06/22/2022    CO2 25 06/22/2022    TSH 0.619 04/29/2022    INR 0.9 01/08/2022    HGBA1C 6.6 (H) 03/10/2022         Imaging: Previous imaging has been reviewed     Assessment and Plan:     Mrs. Abdul is a pleasant 62 year old who presents today with her  to discuss the elevated white count and possible  etiologies    Monoclonal B-cell lymphocytosis   --No clear identification of a CLL or other malignant diagnosis  --White count on recheck was within a normal range  --Awaiting CLL fish for final classification  --Single elevated white count read could be the result of the identified pneumonia    Migraine  --Unrelenting for several weeks  --Referral to headache neurology      60 minutes were spent face to face with the patient and her  family to discuss the disease, natural history, treatment options. I have provided the patient with an opportunity to ask questions and have all questions answered to her satisfaction.       she will return to clinic PRN, but knows to call in the interim if symptoms change or should a problem arise.        Eun Gonzalez MD  Hematology and Medical Oncology  Bone Marrow Transplant  [facility]      BMT Chart Routing      Follow up with physician Other. PRN   Follow up with TERESO Other.   Infusion scheduling note    Injection scheduling note    Labs None   Lab interval:     Imaging None      Pharmacy appointment No pharmacy appointment needed      Other referrals No additional referrals needed

## 2022-06-22 NOTE — H&P (VIEW-ONLY)
[facility]        Overactive Bladder (OAB) Return Note     6/3/2022     Referred by:  Kalin Desir MD     [facility]: Earl Abdul is a very pleasant 62 y.o. female who is a return patient to our department referred for evaluation of urinary incontinence and fecal incontinence of several years duration. She has already completed a voiding diary which was reviewed today. She has previously seen Dr. Umaña back in December.  She reports bother is associated with urinary daytime frequency (11-14x daily), with nocturia (3-4x per night) and with urgency that results in urinary incontinence 5 x daily. She reports some stress urinary incontinence which is less bothersome to her than her urgency incontinence. She requires adult briefs (5 briefs/day).  She has decreased overall fluid intake.  She reports urinary incontinence is day and night but more predominant during the day. Patient reports urgency is independent of position.      She is also bothered by fecal incontinence, having up to 4 fecal incontinence episodes per day. She has tried pelvic floor exercises, dietary changes, fiber bulking and immodium for her fecal incontinence.      She denies symptoms of irritative voiding including dysuria. She denies symptoms of obstructive voiding including decreased stream, hesitancy, intermittency, post void dribbling and sense of incomplete emptying. Bladder scan PVR was 0 mL. Her history includes no notation of urolithiasis, hematuria, prior pelvic surgery, previous prolapse or incontinence procedures or neurological symptoms/diagnoses. She denies all other prior pelvic surgeries or neurologic diagnoses. She does not report symptoms suggestive of advanced POP. She denies a history of constipation.      A review of 10+ systems was conducted with pertinent positive and negative findings documented in HPI with all other systems reviewed and negative.     Past medical, family, surgical and social history  reviewed as documented in chart with pertinent positive medical, family, surgical and social history detailed in HPI.     Exam Findings:     Const: no acute distress, conversant and alert  Eyes: anicteric, extraocular muscles intact  ENMT: normocephalic, Nl oral membranes  Cardio: no cyanosis, nl cap refill  Pulm: no tachypnea; no resp distress  Abd: soft, no tenderness  Musc: no laceration, no tenderness  Neuro: alert; oriented x 3  Skin: warm, dry; no petichiae  Psych: no anxiety; normal speech      Assessment/Plan:     · Overactive Bladder with Urgency Incontinence (new, addt'l workup): Her history is suggestive of Overactive Bladder (OAB) with predominantly Urgency Urinary Incontinence (UUI). These findings have been confirmed by the completed 3-day voiding diary that she returned today. She has up to 5 urgency incontinence episodes per day. Given the concomitant fecal incontinence which is refractory to more conservative therapy, she would like to proceed with staged testing of SNM for her urinary incontinence.         · Fecal Incontinence with fecal urgency (new, addt'l workup): She has failed to see satisfactory improvement with numerous more conservative therapies as detailed above. She continues to have up to 4 fecal incontinence episodes per day with fecal urgency with each of these. She would like to proceed with staged testing of SNM for treatment of refractory fecal incontinence.      This patient will be scheduled for staged implantation of lead (CPT 99364) as a 1-2 week test of whether there is sufficient response to justify implantation of a permanent generator (CPT 82374). Please note that the placement of a staged  shares the same CPT code (66132) as placement of a permanent lead and generator (additional CPT 63543) after a successful basic evaluation test . This patient must demonstrate at least 50% improvement in symptoms to proceed to generator implantation, which is the purpose of this  test. Clinical data to support the placement of the patient generator (CPT 68939) can only be supplied after placement of the lead (CPT 88007) in this testing scenario.

## 2022-06-23 ENCOUNTER — PATIENT MESSAGE (OUTPATIENT)
Dept: HEMATOLOGY/ONCOLOGY | Facility: CLINIC | Age: 62
End: 2022-06-23
Payer: COMMERCIAL

## 2022-06-24 ENCOUNTER — PATIENT MESSAGE (OUTPATIENT)
Dept: HEMATOLOGY/ONCOLOGY | Facility: CLINIC | Age: 62
End: 2022-06-24
Payer: COMMERCIAL

## 2022-06-24 DIAGNOSIS — C50.111 MALIGNANT NEOPLASM OF CENTRAL PORTION OF RIGHT BREAST IN FEMALE, ESTROGEN RECEPTOR POSITIVE: Primary | ICD-10-CM

## 2022-06-24 DIAGNOSIS — Z17.0 MALIGNANT NEOPLASM OF CENTRAL PORTION OF RIGHT BREAST IN FEMALE, ESTROGEN RECEPTOR POSITIVE: Primary | ICD-10-CM

## 2022-06-24 DIAGNOSIS — R51.9 FREQUENT HEADACHES: ICD-10-CM

## 2022-06-24 LAB
FLOW CYTOMETRY ANTIBODIES ANALYZED - BLOOD: NORMAL
FLOW CYTOMETRY COMMENT - BLOOD: NORMAL
FLOW CYTOMETRY INTERPRETATION - BLOOD: NORMAL
MRSA SPEC QL CULT: NORMAL

## 2022-06-26 ENCOUNTER — HOSPITAL ENCOUNTER (EMERGENCY)
Facility: OTHER | Age: 62
Discharge: HOME | End: 2022-06-26
Attending: EMERGENCY MEDICINE
Payer: COMMERCIAL

## 2022-06-26 VITALS
SYSTOLIC BLOOD PRESSURE: 152 MMHG | TEMPERATURE: 99 F | DIASTOLIC BLOOD PRESSURE: 77 MMHG | BODY MASS INDEX: 30.53 KG/M2 | RESPIRATION RATE: 16 BRPM | WEIGHT: 190 LBS | OXYGEN SATURATION: 96 % | HEART RATE: 107 BPM | HEIGHT: 66 IN

## 2022-06-26 DIAGNOSIS — G43.809 OTHER MIGRAINE WITHOUT STATUS MIGRAINOSUS, NOT INTRACTABLE: Primary | ICD-10-CM

## 2022-06-26 PROCEDURE — 63600175 PHARM REV CODE 636 W HCPCS: Performed by: EMERGENCY MEDICINE

## 2022-06-26 PROCEDURE — 96374 THER/PROPH/DIAG INJ IV PUSH: CPT

## 2022-06-26 PROCEDURE — 96375 TX/PRO/DX INJ NEW DRUG ADDON: CPT

## 2022-06-26 PROCEDURE — 99284 EMERGENCY DEPT VISIT MOD MDM: CPT | Mod: 25

## 2022-06-26 PROCEDURE — 25000003 PHARM REV CODE 250: Performed by: EMERGENCY MEDICINE

## 2022-06-26 PROCEDURE — 96361 HYDRATE IV INFUSION ADD-ON: CPT

## 2022-06-26 RX ORDER — DIPHENHYDRAMINE HYDROCHLORIDE 50 MG/ML
25 INJECTION INTRAMUSCULAR; INTRAVENOUS
Status: COMPLETED | OUTPATIENT
Start: 2022-06-26 | End: 2022-06-26

## 2022-06-26 RX ORDER — BUTALBITAL, ACETAMINOPHEN AND CAFFEINE 50; 325; 40 MG/1; MG/1; MG/1
1 TABLET ORAL EVERY 4 HOURS PRN
Qty: 12 TABLET | Refills: 0 | Status: SHIPPED | OUTPATIENT
Start: 2022-06-26 | End: 2022-06-29

## 2022-06-26 RX ORDER — ONDANSETRON 2 MG/ML
4 INJECTION INTRAMUSCULAR; INTRAVENOUS
Status: COMPLETED | OUTPATIENT
Start: 2022-06-26 | End: 2022-06-26

## 2022-06-26 RX ORDER — KETOROLAC TROMETHAMINE 30 MG/ML
30 INJECTION, SOLUTION INTRAMUSCULAR; INTRAVENOUS
Status: COMPLETED | OUTPATIENT
Start: 2022-06-26 | End: 2022-06-26

## 2022-06-26 RX ORDER — METOCLOPRAMIDE HYDROCHLORIDE 5 MG/ML
10 INJECTION INTRAMUSCULAR; INTRAVENOUS
Status: COMPLETED | OUTPATIENT
Start: 2022-06-26 | End: 2022-06-26

## 2022-06-26 RX ADMIN — SODIUM CHLORIDE 1000 ML: 0.9 INJECTION, SOLUTION INTRAVENOUS at 09:06

## 2022-06-26 RX ADMIN — SODIUM CHLORIDE 1000 ML: 0.9 INJECTION, SOLUTION INTRAVENOUS at 08:06

## 2022-06-26 RX ADMIN — ONDANSETRON 4 MG: 2 INJECTION INTRAMUSCULAR; INTRAVENOUS at 08:06

## 2022-06-26 RX ADMIN — DIPHENHYDRAMINE HYDROCHLORIDE 25 MG: 50 INJECTION, SOLUTION INTRAMUSCULAR; INTRAVENOUS at 08:06

## 2022-06-26 RX ADMIN — KETOROLAC TROMETHAMINE 30 MG: 30 INJECTION, SOLUTION INTRAMUSCULAR; INTRAVENOUS at 08:06

## 2022-06-26 RX ADMIN — METOCLOPRAMIDE 10 MG: 5 INJECTION, SOLUTION INTRAMUSCULAR; INTRAVENOUS at 08:06

## 2022-06-26 NOTE — ED NOTES
Pt IV site symptomatic. IvF not infusing and has small infiltration present. Pt reports improved HA at this time and feels ready to go home. MD notified.

## 2022-06-26 NOTE — ED PROVIDER NOTES
Encounter Date: 6/26/2022    SCRIBE #1 NOTE: [employer], am scribing for, and in the presence of, Abiola Israel MD.       History     Chief Complaint   Patient presents with    Headache     Pt c/o headache onset 3 weeks ago. Pt c.o n/v everyday. AAO x 3 nadn skin w.d      Time seen by provider: 7:55 AM    Earl Abdul is a 62 y.o. female, with a PMHx of COPD, breast cancer, and fibromyalgia, who presents to the ED with a migraine for the past 5 weeks. The patient reports that her headache is pounding and mainly in the front of her head with some radiation to the right side. She states that she is also experiencing photophobia and noise sensitivity. She notes that she has been vomiting daily for the past 3 days. The patient reports that she visited the ER at [facility] 2 weeks ago for her symptoms and had an MRI and CT done, which were found to be unremarkable. She states that she also traveled to [place] recently and had to stay in a hospital there. The patient denies a strong history of migraines, except for some shorter episodes 10-15 years ago. She states that she has many ongoing health issues, including a kidney infection 2 months ago with some evidence of leukemia. She reports that she has been following up with oncology. The patient admits to the use of E cigarettes. This is the extent of the patient's complaints today in the Emergency Department.     The history is provided by the patient and the spouse.     Review of patient's allergies indicates:   Allergen Reactions    Lortab  [hydrocodone-acetaminophen] Itching    Promethazine Other (See Comments) and Itching     Other reaction(s): Itching     Past Medical History:   Diagnosis Date    Anemia     Anemia     Controlled type 2 diabetes mellitus without complication, without long-term current use of insulin 11/30/2021    COPD (chronic obstructive pulmonary disease)     Depression     Diverticulitis     Fatty liver     GERD  (gastroesophageal reflux disease)     Hyperlipidemia     Hypertension     Pancreatitis     Peptic ulcer disease     Polysubstance abuse     Posterior reversible encephalopathy syndrome     Sarcoidosis of lung     Sarcoidosis of lung     over 30 yrs ago    Seizures     7/2017    Suicide attempt     Suicide ideation      Past Surgical History:   Procedure Laterality Date    APPENDECTOMY      BILATERAL MASTECTOMY Bilateral 10/29/2020    Procedure: MASTECTOMY, BILATERAL;  Surgeon: Baylee Kevin MD;  Location: [address];  Service: General;  Laterality: Bilateral;    BREAST REVISION SURGERY Bilateral 2/11/2021    Procedure: BREAST REVISION SURGERY;  Surgeon: Scottie Johnson MD;  Location: [address];  Service: Plastics;  Laterality: Bilateral;    COLONOSCOPY N/A 7/28/2017    Procedure: COLONOSCOPY;  Surgeon: Aaron Alvarado MD;  Location: [facility];  Service: Endoscopy;  Laterality: N/A;    ESOPHAGOGASTRODUODENOSCOPY  10/7/2016, 11/6/2014    2016 - gastritis, duodenitis, 2014 erosive gastritis    ESOPHAGOGASTRODUODENOSCOPY N/A 2/11/2020    Procedure: ESOPHAGOGASTRODUODENOSCOPY (EGD);  Surgeon: Fawn Garrido MD;  Location: [facility];  Service: Endoscopy;  Laterality: N/A;    ESOPHAGOGASTRODUODENOSCOPY N/A 4/19/2021    Procedure: EGD (ESOPHAGOGASTRODUODENOSCOPY);  Surgeon: Paramjit Martino MD;  Location: [facility];  Service: Endoscopy;  Laterality: N/A;    FLEXIBLE SIGMOIDOSCOPY  11/06/2014    colitis    HYSTERECTOMY      INJECTION FOR SENTINEL NODE IDENTIFICATION Right 10/29/2020    Procedure: INJECTION, FOR SENTINEL NODE IDENTIFICATION;  Surgeon: Baylee Kevin MD;  Location: [address];  Service: General;  Laterality: Right;    INJECTION OF JOINT Right 10/10/2019    Procedure: Injection, Joint RIGHT ILIOPSOAS BURSA/TENDON INJECTION AND RIGHT GLUTEAL TENDON INJECTION WITH STEROID AND LIDOCAINE;  Surgeon: Guillaume Rico MD;  Location: [facility];  Service: Pain  Management;  Laterality: Right;  NEEDS CONSENT    INSERTION OF BREAST TISSUE EXPANDER Bilateral 10/29/2020    Procedure: INSERTION, TISSUE EXPANDER, BREAST;  Surgeon: Scottie Johnson MD;  [facility];  Service: Plastics;  Laterality: Bilateral;  Right breast: 1082 g  Left breast: 1076 g    LIPOSUCTION Bilateral 2021    Procedure: LIPOSUCTION;  Surgeon: Scottie Johnson MD;  [facility];  Service: Plastics;  Laterality: Bilateral;    mediastenoscopy      REPLACEMENT OF IMPLANT OF BREAST Bilateral 2021    Procedure: REPLACEMENT, IMPLANT, BREAST;  Surgeon: Scottie Johnson MD;  [facility];  Service: Plastics;  Laterality: Bilateral;    SENTINEL LYMPH NODE BIOPSY Right 10/29/2020    Procedure: BIOPSY, LYMPH NODE, SENTINEL;  Surgeon: Baylee Kevin MD;  Location: [facility] OR [address] [place] [address];  Service: General;  Laterality: Right;    TONSILLECTOMY N/A     TUBAL LIGATION       Family History   Problem Relation Age of Onset    Heart attack Father     Diabetes Father     Hypertension Father     Diabetes Mother     Hypertension Mother     Breast cancer Maternal Aunt     Colon cancer Maternal Uncle     Breast cancer Daughter     Ovarian cancer Neg Hx     Cancer Neg Hx      Social History     Tobacco Use    Smoking status: Current Every Day Smoker     Packs/day: 0.50     Years: 30.00     Pack years: 15.00     Types: Vaping with nicotine, Cigarettes     Last attempt to quit: 2021     Years since quittin.3    Smokeless tobacco: Never Used   Substance Use Topics    Alcohol use: Yes     Comment: vodka daily (half a regular bottle)    Drug use: Yes     Types: Marijuana     Comment: gummies     Review of Systems   Constitutional: Negative for chills and fever.   HENT: Negative for congestion and sore throat.         Positive for noise sensitivity.   Eyes: Positive for photophobia.   Respiratory: Negative for cough.    Cardiovascular:  Negative for chest pain.   Gastrointestinal: Positive for nausea and vomiting. Negative for abdominal pain.   Genitourinary: Negative for dysuria.   Musculoskeletal: Negative for back pain.   Skin: Negative for rash.   Neurological: Positive for headaches.   Hematological: Does not bruise/bleed easily.       Physical Exam     Initial Vitals [06/26/22 0735]   BP Pulse Resp Temp SpO2   137/64 99 18 98.1 °F (36.7 °C) (S) (!) 92 %      MAP       --         Physical Exam    Nursing note and vitals reviewed.  Constitutional: She appears well-developed and well-nourished. She is not diaphoretic. No distress.   HENT:   Head: Normocephalic and atraumatic.   Eyes: Conjunctivae and EOM are normal. Pupils are equal, round, and reactive to light.   Neck: Neck supple.   Cardiovascular: Normal rate, regular rhythm and normal heart sounds.   Pulmonary/Chest: Breath sounds normal. No respiratory distress. She has no wheezes.   Abdominal: Abdomen is soft. Bowel sounds are normal. She exhibits no distension. There is no abdominal tenderness.   Musculoskeletal:         General: No edema.      Cervical back: Neck supple.     Neurological: She is alert and oriented to person, place, and time.   Ambulatory with steady gait.   Skin: Skin is warm and dry.   Psychiatric: She has a normal mood and affect.         ED Course   Procedures  Labs Reviewed - No data to display       Imaging Results    None          Medications   sodium chloride 0.9% bolus 1,000 mL (0 mLs Intravenous Stopped 6/26/22 0943)   ondansetron injection 4 mg (4 mg Intravenous Given 6/26/22 0812)   ketorolac injection 30 mg (30 mg Intravenous Given 6/26/22 0809)   diphenhydrAMINE injection 25 mg (25 mg Intravenous Given 6/26/22 0814)   metoclopramide HCl injection 10 mg (10 mg Intravenous Given 6/26/22 0822)   sodium chloride 0.9% bolus 1,000 mL (0 mLs Intravenous Stopped 6/26/22 0923)     Medical Decision Making:   History:   Old Medical Records: I decided to obtain old  medical records.  Old Records Summarized: records from clinic visits and other records.  Initial Assessment:   7:55AM:  Patient is a 62-year-old female who presents to the emergency department with a headache.  Patient appears well, nontoxic.  She is neurologically intact but does and appear uncomfortable.  Will plan for IV fluids, antiemetics, headache cocktail, will continue to follow and reassess.     10:15 AM:  Patient doing well, feeling better.  Patient is requesting to go home.   I do not feel that further work up in the ED is indicated at this time.  I updated pt regarding results and I counseled pt regarding supportive care measures.  I have discussed with the pt ED return warnings and need for close PCP f/u.  Pt agreeable to plan and all questions answered.  I feel that pt is stable for discharge and management as an outpatient and no further intervention is needed at this time.  Pt is comfortable returning to the ED if needed.  Will DC home in stable condition.            Scribe Attestation:   Scribe #1: I performed the above scribed service and the documentation accurately describes the services I performed. I attest to the accuracy of the note.              Physician Attestation for Scribe: I, Abiola Israel, reviewed documentation as scribed in my presence, which is both accurate and complete.    Clinical Impression:   Final diagnoses:  [G43.809] Other migraine without status migrainosus, not intractable (Primary)          ED Disposition Condition    Discharge Stable        ED Prescriptions     Medication Sig Dispense Start Date End Date Auth. Provider    butalbital-acetaminophen-caffeine -40 mg (FIORICET, ESGIC) -40 mg per tablet Take 1 tablet by mouth every 4 (four) hours as needed for Pain. 12 tablet 6/26/2022 7/26/2022 Abiola Israel MD        Follow-up Information     Follow up With Specialties Details Why Contact Info    Andrew Rodriguez MD Internal Medicine   [address]  [address]  [facility] [address]  [phone]             Abiola Israel MD  06/26/22 7410

## 2022-06-26 NOTE — ED NOTES
Pt reports constant frontal headaches x 5 weeks. Was seen at [facility] a few weeks ago for the same thing and went to [place] last week and was seen in the ER there for headaches. Reports n/v and dizziness with episodes. Denies spinning sensation. Denies head injury/blood thinners. Was prescribed Imitrex PO but not helping. Reports pain is worse to the right frontal area, throbbing, Rates pain 10/10. No nystagmus. +photosensitivity. Uses oxygen nightly at home.

## 2022-06-27 ENCOUNTER — ANESTHESIA EVENT (OUTPATIENT)
Dept: SURGERY | Facility: HOSPITAL | Age: 62
End: 2022-06-27
Payer: COMMERCIAL

## 2022-06-27 ENCOUNTER — TELEPHONE (OUTPATIENT)
Dept: NEUROLOGY | Facility: CLINIC | Age: 62
End: 2022-06-27
Payer: COMMERCIAL

## 2022-06-27 ENCOUNTER — TELEPHONE (OUTPATIENT)
Dept: UROLOGY | Facility: CLINIC | Age: 62
End: 2022-06-27
Payer: COMMERCIAL

## 2022-06-27 PROBLEM — D72.820 LYMPHOCYTOSIS: Status: ACTIVE | Noted: 2022-06-27

## 2022-06-27 NOTE — TELEPHONE ENCOUNTER
Called pt to confirm arrival time of 500am for procedure on 6-28. Gave pt NPO instructions and gave pt opportunity to ask questions. Pt verbalized understanding.     Pt was informed that only 1 person would be allowed to accompany them the morning of surgery.  Pt verbalized understanding.   No

## 2022-06-27 NOTE — TELEPHONE ENCOUNTER
----- Message from Fabby Johnson sent at 6/27/2022 10:17 AM CDT -----  Regarding: Sooner Appt  Contact: [phone]  Sooner Apoointment Request    Caller is requesting a sooner appointment.  Caller declined first available appointment listed below.      Name of Caller: Kevon()    When is the first available appointment? 8/30/2022    Symptoms: Headaches    Would the patient rather a call back or a response via [facility]? Call Back    Best Call Back Number:[phone]    Additional Information: The patient would like to schedule a Botox injection as well.

## 2022-06-27 NOTE — ANESTHESIA PREPROCEDURE EVALUATION
[facility]  Anesthesia Pre-Operative Evaluation         Patient Name/: Earl Abdul, 1960  MRN: [id number]    SUBJECTIVE:     Pre-operative evaluation for Procedure(s) (LRB):  INSERTION, NEUROSTIMULATOR, TEMPORARY, SACRAL (N/A)     2022    Earl Abdul is a 62 y.o. female w/ a significant PMHx of migraines, COPD (night oxygen of 3L), Etoh abuse (withdrawal seizures in past and pancreatitis), Former Smoker 1ppd, Hepatic Steatosis, breast cancer, fibromyalgia, DM T2, anemia, GERD, hypertension, hyperlipidemia, recent PNA (treated/hospitalized in [place]), overactive bladder, fecal incontinence .    Patient now presents for the above procedure(s).      Prev airway:     Intubation 10/29/2020    Mask Ventilation:  Easy with oral airway    Attempts:  1    Attempted By:  Resident anesthesiologist    Method of Intubation:  Direct Blade:  [place] 3    Laryngeal View Grade: Grade I - full view of chords      Difficult Airway Encountered?: No      Complications:  None    Airway Device:  7.0 Oral endotracheal tube    Style/Cuff Inflation:  Cuffed (inflated to minimal occlusive pressure)    LDA: None documented.     Drips: None documented.      Patient Active Problem List   Diagnosis    Alcohol use disorder, severe, dependence    Alcohol withdrawal    Essential hypertension    Fibromyalgia    Tobacco abuse    Cannabis abuse, in remission    Sarcoidosis    History of Clostridium difficile colitis    Diarrhea    Dehydration    History of substance abuse    Pyelonephritis due to Escherichia coli    New onset seizure    Posterior reversible encephalopathy syndrome    Depression with anxiety    Erythema nodosum    History of sarcoidosis    Hyperlipidemia    Ramírez's syndrome    Degenerative joint disease (DJD) of lumbar spine    Decreased ROM of lumbar spine    Difficulty walking    VINICIO (obstructive sleep apnea)    At risk for lymphedema    Malignant neoplasm of  central portion of right breast in female, estrogen receptor positive    COPD (chronic obstructive pulmonary disease)    Incontinence of feces    Low serum vitamin B12    Anemia    Urge incontinence of urine    Hypothyroidism    Type 2 diabetes mellitus with hyperglycemia    Obesity (BMI 30.0-34.9)    Fecal incontinence    Mixed incontinence    Pelvic floor tension    Chronic hypoxemic respiratory failure    COVID-19    Hypoxia    Shortness of breath    Alcoholic ketoacidosis    E coli bacteremia    Lymphocytosis       Review of patient's allergies indicates:   Allergen Reactions    Lortab [hydrocodone-acetaminophen] Itching    Promethazine Itching and Other (See Comments)       Current Inpatient Medications:       Current Facility-Administered Medications on File Prior to Encounter   Medication Dose Route Frequency Provider Last Rate Last Admin    albuterol sulfate nebulizer solution 2.5 mg  2.5 mg Nebulization Once Andrew Rodriguez MD         Current Outpatient Medications on File Prior to Encounter   Medication Sig Dispense Refill    anastrozole (ARIMIDEX) 1 mg Tab Take 1 tablet (1 mg total) by mouth once daily. (Patient taking differently: Take 1 mg by mouth every morning.) 30 tablet 11    ARIPiprazole (ABILIFY) 10 MG Tab Take 10 mg by mouth every morning.      atorvastatin (LIPITOR) 10 MG tablet Take 10 mg by mouth every morning.      DULoxetine (CYMBALTA) 60 MG capsule Take 60 mg by mouth every evening.      gabapentin (NEURONTIN) 600 MG tablet Take 1 tablet (600 mg total) by mouth 2 (two) times daily. 180 tablet 3    levothyroxine (SYNTHROID) 50 MCG tablet Take 1 tablet (50 mcg total) by mouth before breakfast. 90 tablet 3    lisinopriL (PRINIVIL,ZESTRIL) 20 MG tablet Take 1 tablet (20 mg total) by mouth once daily. (Patient taking differently: Take 20 mg by mouth every morning.) 90 tablet 1    metFORMIN (GLUCOPHAGE-XR) 500 MG ER 24hr tablet Take 2 tablets (1,000 mg total) by  mouth 2 (two) times daily with meals. 360 tablet 3    omega-3 fatty acids 1,000 mg Cap Take 1 capsule by mouth once daily.      semaglutide (OZEMPIC) 0.25 mg or 0.5 mg(2 mg/1.5 mL) pen injector Inject 0.5 mg into the skin every 7 days. Start with 0.25 mg weekly for 4 weeks and then increase to 0.5 mg if you are tolerating this dose (Patient taking differently: Inject 0.5 mg into the skin every 7 days. Start with 0.25 mg weekly for 4 weeks and then increase to 0.5 mg if you are tolerating this dose  SATURDAYS) 1.5 mL 11    ATROVENT HFA 17 mcg/actuation inhaler Inhale 2 puffs into the lungs every 6 (six) hours as needed for Wheezing. 12.9 g 11    methylPREDNISolone (MEDROL DOSEPACK) 4 mg tablet use as directed 1 each 0    metoprolol succinate (TOPROL-XL) 25 MG 24 hr tablet Take 25 mg by mouth once daily.      ondansetron (ZOFRAN-ODT) 4 MG TbDL Take 2 tablets (8 mg total) by mouth 2 (two) times daily. (Patient not taking: Reported on 6/22/2022) 30 tablet 0    pantoprazole (PROTONIX) 40 MG tablet Take 1 tablet (40 mg total) by mouth once daily. 30 tablet 0    sumatriptan (IMITREX) 100 MG tablet 0.5-1 tablet earliest onest of migraine. May take addntl dose 2hrs if HA continues. Max 200mg/24hrs. Max 4 days/month 30 tablet 1    tiotropium-olodateroL (STIOLTO RESPIMAT) 2.5-2.5 mcg/actuation Mist Inhale 1 puff into the lungs 2 (two) times a day. Controller 4 g 11       Past Surgical History:   Procedure Laterality Date    APPENDECTOMY      BILATERAL MASTECTOMY Bilateral 10/29/2020    Procedure: MASTECTOMY, BILATERAL;  Surgeon: Baylee Kevin MD;  Location: [address];  Service: General;  Laterality: Bilateral;    BREAST REVISION SURGERY Bilateral 2/11/2021    Procedure: BREAST REVISION SURGERY;  Surgeon: Scottie Johnson MD;  Location: [facility] [address] [place] [address];  Service: Plastics;  Laterality: Bilateral;    COLONOSCOPY N/A 7/28/2017    Procedure: COLONOSCOPY;  Surgeon: Aaron Alvarado MD;  Location: [facility];   Service: Endoscopy;  Laterality: N/A;    ESOPHAGOGASTRODUODENOSCOPY  10/7/2016, 11/6/2014    2016 - gastritis, duodenitis, 2014 erosive gastritis    ESOPHAGOGASTRODUODENOSCOPY N/A 2/11/2020    Procedure: ESOPHAGOGASTRODUODENOSCOPY (EGD);  Surgeon: Fawn Garrido MD;  Location: [facility];  Service: Endoscopy;  Laterality: N/A;    ESOPHAGOGASTRODUODENOSCOPY N/A 4/19/2021    Procedure: EGD (ESOPHAGOGASTRODUODENOSCOPY);  Surgeon: Paramjit Martino MD;  Location: [facility];  Service: Endoscopy;  Laterality: N/A;    FLEXIBLE SIGMOIDOSCOPY  11/06/2014    colitis    HYSTERECTOMY      INJECTION FOR SENTINEL NODE IDENTIFICATION Right 10/29/2020    Procedure: INJECTION, FOR SENTINEL NODE IDENTIFICATION;  Surgeon: Baylee Kevin MD;  Location: [address];  Service: General;  Laterality: Right;    INJECTION OF JOINT Right 10/10/2019    Procedure: Injection, Joint RIGHT ILIOPSOAS BURSA/TENDON INJECTION AND RIGHT GLUTEAL TENDON INJECTION WITH STEROID AND LIDOCAINE;  Surgeon: Guillaume Rico MD;  Location: [facility]IN [facility];  Service: Pain Management;  Laterality: Right;  NEEDS CONSENT    INSERTION OF BREAST TISSUE EXPANDER Bilateral 10/29/2020    Procedure: INSERTION, TISSUE EXPANDER, BREAST;  Surgeon: Scottie Johnson MD;  Location: [address];  Service: Plastics;  Laterality: Bilateral;  Right breast: 1082 g  Left breast: 1076 g    LIPOSUCTION Bilateral 2/11/2021    Procedure: LIPOSUCTION;  Surgeon: Scottie Johnson MD;  Location: [address];  Service: Plastics;  Laterality: Bilateral;    mediastenoscopy      REPLACEMENT OF IMPLANT OF BREAST Bilateral 2/11/2021    Procedure: REPLACEMENT, IMPLANT, BREAST;  Surgeon: Scottie Johnson MD;  Location: [address];  Service: Plastics;  Laterality: Bilateral;    SENTINEL LYMPH NODE BIOPSY Right 10/29/2020    Procedure: BIOPSY, LYMPH NODE, SENTINEL;  Surgeon: Baylee Kevin MD;  Location: [address];  Service: General;  Laterality:  Right;    TONSILLECTOMY N/A 1970    TUBAL LIGATION         Social History:  Tobacco Use: High Risk    Smoking Tobacco Use: Current Every Day Smoker    Smokeless Tobacco Use: Never Used       Alcohol Use: Not on file       OBJECTIVE:     Vital Signs Range:  BMI Readings from Last 1 Encounters:   06/26/22 30.67 kg/m²     Last 3 sets of Vitals    Vitals - 1 value per visit 6/22/2022 6/26/2022 6/26/2022   SYSTOLIC 92 137 152   DIASTOLIC 52 64 77   Pulse 110 99 107   Temp 98.9 98.1 98.5   Resp 16 18 16   SPO2 95 92 96   Weight (lb) 198.86 190 -   Weight (kg) 90.2 86.183 -   Height 65.118 66 -   BMI (Calculated) 33 30.7 -   VISIT REPORT - - -   Pain Score  - - -   Some recent data might be hidden             Significant Labs:        Component Value Date/Time    WBC 7.35 06/22/2022 1457    HGB 10.8 (L) 06/22/2022 1457    HCT 35.8 (L) 06/22/2022 1457    HCT 33 (L) 11/14/2020 1837     (L) 06/22/2022 1457     06/22/2022 1457    K 3.5 06/22/2022 1457     06/22/2022 1457    CO2 25 06/22/2022 1457    GLU 97 06/22/2022 1457    BUN 5 (L) 06/22/2022 1457    CREATININE 0.9 06/22/2022 1457    MG 1.8 04/29/2022 0741    PHOS 3.6 04/29/2022 0741    CALCIUM 9.3 06/22/2022 1457    ALBUMIN 4.1 06/22/2022 1457    PROT 7.0 06/22/2022 1457    ALKPHOS 55 06/22/2022 1457    BILITOT 0.4 06/22/2022 1457     (H) 06/22/2022 1457    ALT 72 (H) 06/22/2022 1457    INR 0.9 01/08/2022 1447    HGBA1C 6.6 (H) 03/10/2022 1231        Please see Results Review for additional labs.     Diagnostic Studies: No relevant studies.    EKG:   Results for orders placed or performed during the hospital encounter of 04/29/22   EKG 12-lead    Collection Time: 05/03/22 12:05 PM    Narrative    Test Reason : Z91.89,    Vent. Rate : 087 BPM     Atrial Rate : 087 BPM     P-R Int : 170 ms          QRS Dur : 092 ms      QT Int : 350 ms       P-R-T Axes : 081 050 043 degrees     QTc Int : 421 ms    Normal sinus rhythm  Normal ECG    Confirmed by  Zain BECERRA, Salvador ([id number]) on 5/3/2022 2:22:18 PM    Referred By: [email or web address]   SELF           Confirmed By:Salvador Pittman MD       ECHO:    1/26/22    · Overall the study quality was technically difficult. The study was difficult due to patient's body habitus and heart rhythm. Tachycardia was present during the study.  · The estimated ejection fraction is 65%.  · The left ventricle is normal in size with normal systolic function.  · Normal left ventricular diastolic function.  · Normal right ventricular size with normal right ventricular systolic function.  · Normal central venous pressure (3 mmHg).             ASSESSMENT/PLAN:       Pre-op Assessment    I have reviewed the Patient Summary Reports.     I have reviewed the Nursing Notes.    I have reviewed the Medications.     Review of Systems  Anesthesia Hx:  History of prior surgery of interest to airway management or planning:   Hematology/Oncology:        Current/Recent Cancer. Breast   Cardiovascular:   Hypertension    Pulmonary:   COPD    Hepatic/GI:   GERD    Musculoskeletal:   Arthritis     Neurological:   Neuromuscular Disease, Seizures    Endocrine:   Diabetes Hypothyroidism    Psych:   Psychiatric History          Physical Exam  General: Well nourished    Airway:  Mallampati: II       Heart:  Rate: Normal  Rhythm: Regular Rhythm        Anesthesia Plan  Type of Anesthesia, risks & benefits discussed:    Anesthesia Type: Gen ETT, MAC  Intra-op Monitoring Plan: Standard ASA Monitors  Post Op Pain Control Plan: multimodal analgesia and IV/PO Opioids PRN  Induction:  IV  Airway Plan: Direct  ASA Score: 3  Day of Surgery Review of History & Physical: H&P Update referred to the surgeon/provider.    Ready For Surgery From Anesthesia Perspective.     .

## 2022-06-27 NOTE — TELEPHONE ENCOUNTER
Spoke with spouse of patient and informed me that he's already scheduled to see another Physician Assistant. Spouse asked to see an MD if possible but none are available (atleast within this week and next). Spouse also asked about scheduling botox but advise to speak with provider to help further assess.

## 2022-06-28 ENCOUNTER — HOSPITAL ENCOUNTER (OUTPATIENT)
Facility: HOSPITAL | Age: 62
Discharge: HOME | End: 2022-06-28
Attending: UROLOGY | Admitting: UROLOGY
Payer: COMMERCIAL

## 2022-06-28 ENCOUNTER — RESEARCH ENCOUNTER (OUTPATIENT)
Dept: RESEARCH | Facility: HOSPITAL | Age: 62
End: 2022-06-28
Payer: COMMERCIAL

## 2022-06-28 ENCOUNTER — ANESTHESIA (OUTPATIENT)
Dept: SURGERY | Facility: HOSPITAL | Age: 62
End: 2022-06-28
Payer: COMMERCIAL

## 2022-06-28 VITALS
TEMPERATURE: 98 F | HEIGHT: 66 IN | BODY MASS INDEX: 30.53 KG/M2 | HEART RATE: 80 BPM | RESPIRATION RATE: 16 BRPM | SYSTOLIC BLOOD PRESSURE: 170 MMHG | DIASTOLIC BLOOD PRESSURE: 74 MMHG | WEIGHT: 190 LBS | OXYGEN SATURATION: 91 %

## 2022-06-28 DIAGNOSIS — N32.81 OAB (OVERACTIVE BLADDER): ICD-10-CM

## 2022-06-28 LAB
POCT GLUCOSE: 100 MG/DL (ref 70–110)
POCT GLUCOSE: 86 MG/DL (ref 70–110)

## 2022-06-28 PROCEDURE — 82962 GLUCOSE BLOOD TEST: CPT | Performed by: UROLOGY

## 2022-06-28 PROCEDURE — 36000707: Performed by: UROLOGY

## 2022-06-28 PROCEDURE — C1767 GENERATOR, NEURO NON-RECHARG: HCPCS | Performed by: UROLOGY

## 2022-06-28 PROCEDURE — 37000009 HC ANESTHESIA EA ADD 15 MINS: Performed by: UROLOGY

## 2022-06-28 PROCEDURE — 25000003 PHARM REV CODE 250

## 2022-06-28 PROCEDURE — 71000015 HC POSTOP RECOV 1ST HR: Performed by: UROLOGY

## 2022-06-28 PROCEDURE — 71000044 HC DOSC ROUTINE RECOVERY FIRST HOUR: Performed by: UROLOGY

## 2022-06-28 PROCEDURE — 37000008 HC ANESTHESIA 1ST 15 MINUTES: Performed by: UROLOGY

## 2022-06-28 PROCEDURE — 63600175 PHARM REV CODE 636 W HCPCS

## 2022-06-28 PROCEDURE — 25000003 PHARM REV CODE 250: Performed by: UROLOGY

## 2022-06-28 PROCEDURE — D9220A PRA ANESTHESIA: Mod: ,,, | Performed by: ANESTHESIOLOGY

## 2022-06-28 PROCEDURE — 76000 FLUOROSCOPY <1 HR PHYS/QHP: CPT | Mod: 26,,, | Performed by: UROLOGY

## 2022-06-28 PROCEDURE — 64581 PR IMPLANTATION, NEUROSTIM ELECT ARRAY, OPEN, SACRAL NERVE: ICD-10-PCS | Mod: ,,, | Performed by: UROLOGY

## 2022-06-28 PROCEDURE — 64581 OPN IMPLTJ NEA SACRAL NERVE: CPT | Mod: ,,, | Performed by: UROLOGY

## 2022-06-28 PROCEDURE — C1883 ADAPT/EXT, PACING/NEURO LEAD: HCPCS | Performed by: UROLOGY

## 2022-06-28 PROCEDURE — 36000706: Performed by: UROLOGY

## 2022-06-28 PROCEDURE — D9220A PRA ANESTHESIA: ICD-10-PCS | Mod: ,,, | Performed by: ANESTHESIOLOGY

## 2022-06-28 PROCEDURE — 76000 PR  FLUOROSCOPE EXAMINATION: ICD-10-PCS | Mod: 26,,, | Performed by: UROLOGY

## 2022-06-28 PROCEDURE — 71000045 HC DOSC ROUTINE RECOVERY EA ADD'L HR: Performed by: UROLOGY

## 2022-06-28 PROCEDURE — C1778 LEAD, NEUROSTIMULATOR: HCPCS | Performed by: UROLOGY

## 2022-06-28 DEVICE — LEAD INTERSTIM 2 SURESCAN 28CM: Type: IMPLANTABLE DEVICE | Site: SACRUM | Status: FUNCTIONAL

## 2022-06-28 RX ORDER — PHENYLEPHRINE HYDROCHLORIDE 10 MG/ML
INJECTION INTRAVENOUS
Status: DISCONTINUED | OUTPATIENT
Start: 2022-06-28 | End: 2022-06-28

## 2022-06-28 RX ORDER — FENTANYL CITRATE 50 UG/ML
INJECTION, SOLUTION INTRAMUSCULAR; INTRAVENOUS
Status: DISCONTINUED | OUTPATIENT
Start: 2022-06-28 | End: 2022-06-28

## 2022-06-28 RX ORDER — TRAMADOL HYDROCHLORIDE 50 MG/1
50 TABLET ORAL EVERY 6 HOURS
Qty: 5 TABLET | Refills: 0 | Status: ON HOLD | OUTPATIENT
Start: 2022-06-28 | End: 2022-09-27

## 2022-06-28 RX ORDER — CEFAZOLIN SODIUM/WATER 2 G/20 ML
2 SYRINGE (ML) INTRAVENOUS
Status: DISCONTINUED | OUTPATIENT
Start: 2022-06-28 | End: 2022-06-28 | Stop reason: HOSPADM

## 2022-06-28 RX ORDER — BUPIVACAINE HYDROCHLORIDE AND EPINEPHRINE 2.5; 5 MG/ML; UG/ML
INJECTION, SOLUTION EPIDURAL; INFILTRATION; INTRACAUDAL; PERINEURAL
Status: DISCONTINUED | OUTPATIENT
Start: 2022-06-28 | End: 2022-06-28 | Stop reason: HOSPADM

## 2022-06-28 RX ORDER — PROPOFOL 10 MG/ML
VIAL (ML) INTRAVENOUS
Status: DISCONTINUED | OUTPATIENT
Start: 2022-06-28 | End: 2022-06-28

## 2022-06-28 RX ORDER — MIDAZOLAM HYDROCHLORIDE 1 MG/ML
INJECTION, SOLUTION INTRAMUSCULAR; INTRAVENOUS
Status: DISCONTINUED | OUTPATIENT
Start: 2022-06-28 | End: 2022-06-28

## 2022-06-28 RX ORDER — FENTANYL CITRATE 50 UG/ML
25 INJECTION, SOLUTION INTRAMUSCULAR; INTRAVENOUS EVERY 5 MIN PRN
Status: DISCONTINUED | OUTPATIENT
Start: 2022-06-28 | End: 2022-06-28 | Stop reason: HOSPADM

## 2022-06-28 RX ORDER — ONDANSETRON 2 MG/ML
4 INJECTION INTRAMUSCULAR; INTRAVENOUS DAILY PRN
Status: DISCONTINUED | OUTPATIENT
Start: 2022-06-28 | End: 2022-06-28 | Stop reason: HOSPADM

## 2022-06-28 RX ORDER — SCOLOPAMINE TRANSDERMAL SYSTEM 1 MG/1
1 PATCH, EXTENDED RELEASE TRANSDERMAL ONCE
Status: DISCONTINUED | OUTPATIENT
Start: 2022-06-28 | End: 2022-06-28 | Stop reason: HOSPADM

## 2022-06-28 RX ORDER — DEXAMETHASONE SODIUM PHOSPHATE 4 MG/ML
INJECTION, SOLUTION INTRA-ARTICULAR; INTRALESIONAL; INTRAMUSCULAR; INTRAVENOUS; SOFT TISSUE
Status: DISCONTINUED | OUTPATIENT
Start: 2022-06-28 | End: 2022-06-28

## 2022-06-28 RX ORDER — LIDOCAINE HYDROCHLORIDE 20 MG/ML
INJECTION INTRAVENOUS
Status: DISCONTINUED | OUTPATIENT
Start: 2022-06-28 | End: 2022-06-28

## 2022-06-28 RX ORDER — ONDANSETRON 2 MG/ML
INJECTION INTRAMUSCULAR; INTRAVENOUS
Status: DISCONTINUED | OUTPATIENT
Start: 2022-06-28 | End: 2022-06-28

## 2022-06-28 RX ORDER — SUCCINYLCHOLINE CHLORIDE 20 MG/ML
INJECTION INTRAMUSCULAR; INTRAVENOUS
Status: DISCONTINUED | OUTPATIENT
Start: 2022-06-28 | End: 2022-06-28

## 2022-06-28 RX ORDER — SODIUM CHLORIDE 0.9 % (FLUSH) 0.9 %
10 SYRINGE (ML) INJECTION
Status: DISCONTINUED | OUTPATIENT
Start: 2022-06-28 | End: 2022-06-28 | Stop reason: HOSPADM

## 2022-06-28 RX ADMIN — PROPOFOL 50 MG: 10 INJECTION, EMULSION INTRAVENOUS at 08:06

## 2022-06-28 RX ADMIN — LIDOCAINE HYDROCHLORIDE 100 MG: 20 INJECTION, SOLUTION INTRAVENOUS at 07:06

## 2022-06-28 RX ADMIN — MIDAZOLAM HYDROCHLORIDE 2 MG: 1 INJECTION, SOLUTION INTRAMUSCULAR; INTRAVENOUS at 07:06

## 2022-06-28 RX ADMIN — SODIUM CHLORIDE: 0.9 INJECTION, SOLUTION INTRAVENOUS at 07:06

## 2022-06-28 RX ADMIN — PROPOFOL 200 MG: 10 INJECTION, EMULSION INTRAVENOUS at 07:06

## 2022-06-28 RX ADMIN — FENTANYL CITRATE 100 MCG: 50 INJECTION, SOLUTION INTRAMUSCULAR; INTRAVENOUS at 07:06

## 2022-06-28 RX ADMIN — PHENYLEPHRINE HYDROCHLORIDE 200 MCG: 10 INJECTION INTRAVENOUS at 07:06

## 2022-06-28 RX ADMIN — SODIUM CHLORIDE 0.5 MCG/KG/MIN: 9 INJECTION, SOLUTION INTRAVENOUS at 07:06

## 2022-06-28 RX ADMIN — DEXAMETHASONE SODIUM PHOSPHATE 4 MG: 4 INJECTION, SOLUTION INTRAMUSCULAR; INTRAVENOUS at 07:06

## 2022-06-28 RX ADMIN — SCOPALAMINE 1 PATCH: 1 PATCH, EXTENDED RELEASE TRANSDERMAL at 06:06

## 2022-06-28 RX ADMIN — SUCCINYLCHOLINE CHLORIDE 170 MG: 20 INJECTION, SOLUTION INTRAMUSCULAR; INTRAVENOUS; PARENTERAL at 07:06

## 2022-06-28 RX ADMIN — SODIUM CHLORIDE, SODIUM GLUCONATE, SODIUM ACETATE, POTASSIUM CHLORIDE, MAGNESIUM CHLORIDE, SODIUM PHOSPHATE, DIBASIC, AND POTASSIUM PHOSPHATE: .53; .5; .37; .037; .03; .012; .00082 INJECTION, SOLUTION INTRAVENOUS at 08:06

## 2022-06-28 RX ADMIN — ONDANSETRON 4 MG: 2 INJECTION INTRAMUSCULAR; INTRAVENOUS at 07:06

## 2022-06-28 NOTE — PATIENT INSTRUCTIONS
Sacral Neuromodulation Post-Operative Care    Caring for Your Wound   After surgery you will have a dressing over the surgical site.  Do not remove or change the dressing. If your dressing becomes saturated or undone, you  may reinforce it with more tape and/or gauze but you should call the office to be evaluated.    Post Surgical Pain Management   It is normal to experience some discomfort post operatively, however the stimulation should not  be painful.   Take Tylenol 650mg 1-2 tabs every 4-6 hours as needed if you feel tenderness from surgical  incision (Do not to exceed 4000mg daily) or Motrin 200mg 1-2 tabs every 4-6 hours.  Warning: Do not take additional Acetaminophen (Tylenol) while taking oxycodone or hydrocodone. All of these  products contain Acetaminophen, which can be toxic if taken in excess.    Stimulation   Stimulation is the pulling or tingling sensation felt in the pelvic area (near the vagina, scrotum  or anal area.) It should not be painful. If it is painful or you feel the stimulation sensation move  down the legs decrease the stimulation and call the office and speak to a nurse.   During the trial period (stage 1) you may notice that the Interstim device has improved your  continence which means it is working and on the correct setting. If you are having episodes of  incontinence you will need to increase your device setting by moving the dial up slowly.    Activity   Avoid heavy lifting or strenuous activity for 14 days after your surgery.   Frontal Showers or Sponge bath only for two weeks after each surgery. Avoid immersion in any  water until after your post-op appointment.    Symptoms to Report   Severe or worsening pain, unrelieved by pain medications.   Fever, greater than 101.5ºF, chills or sweats   Painful or problems urinating   Any problems with the device    If you experience any of the above symptoms, call the [facility] clinic at [phone] during business hours on  weekdays. If after hours or on weekends, call [phone] and ask to speak with the urology resident on call.

## 2022-06-28 NOTE — OP NOTE
Neuromodulation Operative Note    Preoperative Diagnosis:   1. Urinary frequency  2. Urinary urgency  3. Urgency incontinence  4. Fecal incontinence    Postoperative Diagnosis:  1. Urinary frequency  2. Urinary urgency  3. Urgency incontinence  4. Fecal incontinence    Procedure:  1. Incision for implantation of neurostimulator electrode array; sacral nerve (37416)  2. Complex analysis and programming of implanted neurostimulator pulse generator (37548)  3. Fluoroscopic guidance of needle (32402-99)    Attending Surgeon: Juaquin Edwards MD    Anesthesia: General Endotracheal anesthesia     EBL: <10 mL    Complications: None    Findings:   1. Lead Placement: right  2. Opening Thresholds: all less than equal to 2.0 V  3. Placement of Interstim X lead    Reason for Procedure: Earl Abdul is a very pleasant 62 y.o. female with a history of frequency, urgency, urgency incontinence and fecal incontinence. Patient has attempted previous, more conservative, behavioral therapies in the form of dietary modification, fluid moderation and timed voiding . After discussing risks and benefits in detail and answering all questions, the patient has been consented to proceed with staged testing of sacral neuromodulation.     Procedure in Detail: Our standard sacral neuromodulation infection protocol was utilized preoperatively, intraoperatively and postoperatively, including perioperative antibiotics in accordance with that protocol. After informed consent was obtained and documented in the chart, She was brought to the OR suite where general endotracheal anesthesia was undertaken by the OR staff. She was then gently rolled into a prone position with all pressure points padded and chest rolls used to facilitate ventilation. A formal timeout was performed. She was prepped and draped in accordance with our infection protocol.  A ground pad was placed on the bottom of the patient's foot and the proximal ends of the test  stimulation cable were connected to the ground pad and the external neurostimulator (ENS).     The c-arm was moved into AP position and  AP images were obtained of the sacrum. The medial borders of the S3 foramina were identified and marked on the skin. The c-arm was then moved into the lateral position to identify the S3 foramen.The 3.5 inch needle was used and inserted along the most upper and medial aspect of the S3 foramen bilaterally and appropriate needle depth was visualized utilizing fluoroscopy. Opening motor thresholds with the needle tip just at the anterior edge of the sacrum were found to be less than or equal to 2.0 mA on all electrodes.  The right motor response was found to be superior and the decision was made to proceed on this side.     The foramen needle stylet was removed and a directional guide was placed through the needle using markers on the guide to assure appropriate depth. The foramen needle was removed by sliding over the directional guide. A small vertical incision was made inclusive of the directional guide through the skin. The lead introducer with dilator was placed over the directional guide and utilizing  fluoroscopic guidance, the lead introducer was advanced until the tip of the dilator sheath was seen at the anterior edge of the sacrum. The dilator sheath was removed along with the directional guide. Under fluoroscopic guidance, the 978BB1 (Interstim X) lead with the curved-tip stylet directed laterally was advanced such that the most proximal contact was situated at the anterior edge of the sacrum.    All four electrode contacts were tested, observing arelis and plantar flexion of the great toe utilizing the test stimulation cable and the external neurostimulator and controller. Opening thresholds with electrode contacts 1-4 less than or equal to 2.0 mA on all electrodes. The introducer was retracted over the lead, deploying the tines. Retesting of all four electrodes  confirmed appropriate opening responses.     The potential internal neurostimulator pocket site was identified below the iliac crest and lateral to the sacrum. Local anesthetic was administered and an approximately 2cm incision was made into the subcutaneous tissue creating a connection pocket site. A vicryl anchoring suture was pre-placed in the medial aspect of the pocket through the fascia at the base.     The tunneling tool with sheath and dilator tip was placed from the lead insertion site subcutaneously to the small incised connection pocket site. The tunneling tool was removed and the lead was fed through the sheath, exiting at the connection pocket site. The sheath was removed. An incision was made at the contralateral percutaneous extension site. The tunneling tool was reassembled with the dilator tip excluding the tunneling tube. The tunneling tool was inserted at the pocket connection site and tunneled to the percutaneous extension exit site. The dilator tip was removed and replaced with the carrier tip. The connector end of the percutaneous extension was pressed fit into the carrier tip and the tunneling tool was pulled back to the connection pocket site. The connector end of the percutaneous extension was detached and the tunneling tool and carrier tip was removed. The lead and connector end of the percutaneous extension were cleaned and dried. The lead was inserted into percutaneous extension connection hub. The setscrew was tightened with the torque wrench until an audible click was heard.     The anchor suture was tied down just distal to the percutaneous extension connector and tension on the external portion of the extension resulted in no migration of the connector. The incision was irrigated with sterile water and closed with 3-0 Monocryl suture. The incisions were then covered with Tegaderm dressing with the addition of a Biopatch at the percutaneous extension exit site.     The patient was  transferred to the recovery room in stable condition. Using the controller and external neurostimulator, the patient was programmed to the electrode of optimum sensation and provided utilization instructions prior to discharge. Patient will complete a voiding diary during testing period to help document results of this test procedure.    As attending surgeon, I was present for all key portions of the procedure and immediately available for any non-key portions of the procedure including induction and extubation.

## 2022-06-28 NOTE — PLAN OF CARE
Pt awake and alert.  Pain tolerable and procedure/surgery site stable.  Discharge instructions reviewed and handout given.  Pt states ready to go home. Waiting for [employer] to discuss device

## 2022-06-28 NOTE — TRANSFER OF CARE
"Anesthesia Transfer of Care Note    Patient: Earl Abdul    Procedure(s) Performed: Procedure(s) (LRB):  INSERTION, NEUROSTIMULATOR, TEMPORARY, SACRAL (N/A)    Patient location: PACU    Anesthesia Type: general    Transport from OR: Transported from OR on 6-10 L/min O2 by face mask with adequate spontaneous ventilation    Post pain: adequate analgesia    Post assessment: tolerated procedure well    Post vital signs: stable    Level of consciousness: awake    Nausea/Vomiting: no nausea/vomiting    Complications: none    Transfer of care protocol was followed      Last vitals:   Visit Vitals  BP (!) 131/92   Pulse 93   Temp 36.2 °C (97.1 °F) (Temporal)   Resp 19   Ht 5' 6" (1.676 m)   Wt 86.2 kg (190 lb)   SpO2 99%   Breastfeeding No   BMI 30.67 kg/m²     "

## 2022-06-28 NOTE — DISCHARGE SUMMARY
[facility]  Discharge Note  Short Stay    Admit Date: 6/28/2022    Discharge Date and Time: 06/28/2022 9:29 AM      Attending Physician: Juaquin Edwards MD     Discharge Provider: Yamila Torres    Diagnoses:  Past Medical History:   Diagnosis Date    Anemia     Anemia     Controlled type 2 diabetes mellitus without complication, without long-term current use of insulin 11/30/2021    COPD (chronic obstructive pulmonary disease)     Depression     Diverticulitis     Fatty liver     GERD (gastroesophageal reflux disease)     Hyperlipidemia     Hypertension     Pancreatitis     Peptic ulcer disease     Polysubstance abuse     Posterior reversible encephalopathy syndrome     Sarcoidosis of lung     Sarcoidosis of lung     over 30 yrs ago    Seizures     7/2017    Suicide attempt     Suicide ideation        Discharged Condition: good    Hospital Course: Patient was admitted for SNM and tolerated the procedure well with no complications. The patient was discharged home in good condition on the same day.       Final Diagnoses: Same as principal problem.    Disposition: Home or Self Care    Follow up/Patient Instructions:    Medications:  Reconciled Home Medications:   Current Discharge Medication List      START taking these medications    Details   traMADoL (ULTRAM) 50 mg tablet Take 1 tablet (50 mg total) by mouth every 6 (six) hours.  Qty: 5 tablet, Refills: 0    Comments: Quantity prescribed more than 7 day supply? No         CONTINUE these medications which have NOT CHANGED    Details   anastrozole (ARIMIDEX) 1 mg Tab Take 1 tablet (1 mg total) by mouth once daily.  Qty: 30 tablet, Refills: 11    Associated Diagnoses: Malignant neoplasm of central portion of right breast in female, estrogen receptor positive      ARIPiprazole (ABILIFY) 10 MG Tab Take 10 mg by mouth every morning.      atorvastatin (LIPITOR) 10 MG tablet Take 10 mg by mouth every morning.      ATROVENT HFA 17  mcg/actuation inhaler Inhale 2 puffs into the lungs every 6 (six) hours as needed for Wheezing.  Qty: 12.9 g, Refills: 11      butalbital-acetaminophen-caffeine -40 mg (FIORICET, ESGIC) -40 mg per tablet Take 1 tablet by mouth every 4 (four) hours as needed for Pain.  Qty: 12 tablet, Refills: 0      DULoxetine (CYMBALTA) 60 MG capsule Take 60 mg by mouth every evening.      gabapentin (NEURONTIN) 600 MG tablet Take 1 tablet (600 mg total) by mouth 2 (two) times daily.  Qty: 180 tablet, Refills: 3      levothyroxine (SYNTHROID) 50 MCG tablet Take 1 tablet (50 mcg total) by mouth before breakfast.  Qty: 90 tablet, Refills: 3      lisinopriL (PRINIVIL,ZESTRIL) 20 MG tablet Take 1 tablet (20 mg total) by mouth once daily.  Qty: 90 tablet, Refills: 1    Comments: .      metFORMIN (GLUCOPHAGE-XR) 500 MG ER 24hr tablet Take 2 tablets (1,000 mg total) by mouth 2 (two) times daily with meals.  Qty: 360 tablet, Refills: 3    Associated Diagnoses: Uncontrolled type 2 diabetes mellitus with hyperglycemia      ondansetron (ZOFRAN-ODT) 4 MG TbDL Take 2 tablets (8 mg total) by mouth 2 (two) times daily.  Qty: 30 tablet, Refills: 0    Associated Diagnoses: Nausea      pantoprazole (PROTONIX) 40 MG tablet Take 1 tablet (40 mg total) by mouth once daily.  Qty: 30 tablet, Refills: 0      semaglutide (OZEMPIC) 0.25 mg or 0.5 mg(2 mg/1.5 mL) pen injector Inject 0.5 mg into the skin every 7 days. Start with 0.25 mg weekly for 4 weeks and then increase to 0.5 mg if you are tolerating this dose  Qty: 1.5 mL, Refills: 11    Associated Diagnoses: Uncontrolled type 2 diabetes mellitus with hyperglycemia      tiotropium-olodateroL (STIOLTO RESPIMAT) 2.5-2.5 mcg/actuation Mist Inhale 1 puff into the lungs 2 (two) times a day. Controller  Qty: 4 g, Refills: 11      traZODone (DESYREL) 300 MG tablet Take 1 tablet (300 mg total) by mouth nightly.  Qty: 30 tablet, Refills: 2    Associated Diagnoses: Insomnia, unspecified type       azithromycin (Z-DENVER) 250 MG tablet Take 1 tablet (250 mg total) by mouth once daily. Take first 2 tablets together, then 1 every day until finished.  Qty: 6 tablet, Refills: 0      methylPREDNISolone (MEDROL DOSEPACK) 4 mg tablet use as directed  Qty: 1 each, Refills: 0      metoprolol succinate (TOPROL-XL) 25 MG 24 hr tablet Take 25 mg by mouth once daily.      omega-3 fatty acids 1,000 mg Cap Take 1 capsule by mouth once daily.      sumatriptan (IMITREX) 100 MG tablet 0.5-1 tablet earliest onest of migraine. May take addntl dose 2hrs if HA continues. Max 200mg/24hrs. Max 4 days/month  Qty: 30 tablet, Refills: 1           No discharge procedures on file.   Follow-up Information     Juaquin Edwards MD Follow up.    Specialty: Urology  Contact information:  [address] [facility] [address]  [phone]                         Discharge Procedure Orders (must include Diet, Follow-up, Activity):  No discharge procedures on file.

## 2022-06-28 NOTE — PROGRESS NOTES
[employer] PEER2 study  IRB# 2021.246  PI: Dr. Edwards  Subject number: [id number]     Date: 28-Jun-2022     Lead Implant: OAB cohort     I. Patient's eligibility was reviewed and confirmed by PI prior to lead implant procedure              -Patient diary Day 1: 8 voids and 9 leaks all urgent                                     Day 2: 11 voids and 6 leaks and 5 urgent                                    Day 3: 11 voids and 9 leaks 7 urgent   We will use the last 3 days (Jun 25-27)  of her diary because they were reviewed and eligibility was confirme  II. Confirmed- no new adverse events to report     III. Change in medication- none      IV. During lead placement procedure the following data was collected and documented on study provided case report forms including   1. Device and procedure related information  2. Motor threshold assessment  Intra op- Signal acquisition was performed by trained and delegated [employer] research personnel. The PEER System was briefly disconnected by delegated site personnel, from the subject while they were moved to recovery.     V. Post procedure:  1. Sensory threshold was assessed by [employer] device rep and programming related data was collected by study personnel on study source worksheet.  Post op- The PEER system was reconnected to the subject by delegated site personnel. Signal acquisition was performed by trained [employer] research personnel.      V. Diary Instructions and distribution  Subject was provided with IRB approved OAB cohort diary booklet and was re- trained on the following:   · Diary instructions  · Diary definitions  · GCP practices were reviewed with the subject     Patient was provided with a paper diary and fed ex envelope. She will do her 3 day therapy evaluation diary. The diary will be reviewed prior to her device implant.  She will bring her diary back on the day of her surgery. An extra diary was also provided.   's contact information  was provided.

## 2022-06-28 NOTE — ANESTHESIA PROCEDURE NOTES
Intubation    Date/Time: 6/28/2022 7:17 AM  Performed by: Justyn Berger DO  Authorized by: Reginaldo Camejo MD     Intubation:     Induction:  Intravenous    Intubated:  Postinduction    Mask Ventilation:  Easy mask    Attempts:  1    Attempted By:  Resident anesthesiologist    Method of Intubation:  Direct    Blade:  Chu 2    Laryngeal View Grade: Grade I - full view of cords      Difficult Airway Encountered?: No      Complications:  None    Airway Device:  Oral endotracheal tube    Airway Device Size:  7.0    Style/Cuff Inflation:  Cuffed (inflated to minimal occlusive pressure)    Placement Verified By:  Capnometry    Complicating Factors:  None    Findings Post-Intubation:  BS equal bilateral and atraumatic/condition of teeth unchanged

## 2022-06-28 NOTE — ANESTHESIA POSTPROCEDURE EVALUATION
Anesthesia Post Evaluation    Patient: Earl Abdul    Procedure(s) Performed: Procedure(s) (LRB):  INSERTION, NEUROSTIMULATOR, TEMPORARY, SACRAL (N/A)    Final Anesthesia Type: general      Patient location during evaluation: PACU  Patient participation: Yes- Able to Participate  Level of consciousness: awake and alert  Post-procedure vital signs: reviewed and stable  Pain management: adequate  Airway patency: patent    PONV status at discharge: No PONV  Anesthetic complications: no      Cardiovascular status: blood pressure returned to baseline  Respiratory status: unassisted  Follow-up not needed.          Vitals Value Taken Time   /81 06/28/22 1001   Temp 36.2 °C (97.1 °F) 06/28/22 0909   Pulse 87 06/28/22 1032   Resp 20 06/28/22 1032   SpO2 90 % 06/28/22 1032   Vitals shown include unvalidated device data.      No case tracking events are documented in the log.      Pain/Mary Score: Mary Score: 9 (6/28/2022 10:02 AM)

## 2022-06-29 ENCOUNTER — PATIENT MESSAGE (OUTPATIENT)
Dept: NEUROLOGY | Facility: CLINIC | Age: 62
End: 2022-06-29

## 2022-06-29 ENCOUNTER — TELEPHONE (OUTPATIENT)
Dept: NEUROLOGY | Facility: CLINIC | Age: 62
End: 2022-06-29

## 2022-06-29 ENCOUNTER — OFFICE VISIT (OUTPATIENT)
Dept: NEUROLOGY | Facility: CLINIC | Age: 62
End: 2022-06-29
Payer: COMMERCIAL

## 2022-06-29 DIAGNOSIS — R11.0 NAUSEA: ICD-10-CM

## 2022-06-29 DIAGNOSIS — I10 ESSENTIAL HYPERTENSION: ICD-10-CM

## 2022-06-29 DIAGNOSIS — G43.001 MIGRAINE WITHOUT AURA AND WITH STATUS MIGRAINOSUS, NOT INTRACTABLE: Primary | ICD-10-CM

## 2022-06-29 DIAGNOSIS — E78.5 HYPERLIPIDEMIA, UNSPECIFIED HYPERLIPIDEMIA TYPE: ICD-10-CM

## 2022-06-29 DIAGNOSIS — G47.33 OSA (OBSTRUCTIVE SLEEP APNEA): ICD-10-CM

## 2022-06-29 DIAGNOSIS — R51.9 FREQUENT HEADACHES: ICD-10-CM

## 2022-06-29 LAB
CLINICAL CYTOGENETICIST REVIEW: NORMAL
CLL GENE MUT ANL BLD/T: NORMAL
CLL, FISH ADDITIONAL INFORMATION: NORMAL
CLL, FISH DISCLAIMER: NORMAL
CLL, FISH RELEASED BY: NORMAL
CLL, FISH RESULT SUMMARY: NORMAL
CLL, FISH RESULT TABLE: NORMAL
FCLL REASON FOR REFERRAL: NORMAL
FCLL SPECIMEN: NORMAL
REF LAB TEST METHOD: NORMAL
SPECIMEN SOURCE: NORMAL

## 2022-06-29 PROCEDURE — 1160F PR REVIEW ALL MEDS BY PRESCRIBER/CLIN PHARMACIST DOCUMENTED: ICD-10-PCS | Mod: CPTII,S$GLB,, | Performed by: PHYSICIAN ASSISTANT

## 2022-06-29 PROCEDURE — 1159F MED LIST DOCD IN RCRD: CPT | Mod: CPTII,S$GLB,, | Performed by: PHYSICIAN ASSISTANT

## 2022-06-29 PROCEDURE — 99215 OFFICE O/P EST HI 40 MIN: CPT | Mod: S$GLB,,, | Performed by: PHYSICIAN ASSISTANT

## 2022-06-29 PROCEDURE — 3061F NEG MICROALBUMINURIA REV: CPT | Mod: CPTII,S$GLB,, | Performed by: PHYSICIAN ASSISTANT

## 2022-06-29 PROCEDURE — 3061F PR NEG MICROALBUMINURIA RESULT DOCUMENTED/REVIEW: ICD-10-PCS | Mod: CPTII,S$GLB,, | Performed by: PHYSICIAN ASSISTANT

## 2022-06-29 PROCEDURE — 99215 PR OFFICE/OUTPT VISIT, EST, LEVL V, 40-54 MIN: ICD-10-PCS | Mod: S$GLB,,, | Performed by: PHYSICIAN ASSISTANT

## 2022-06-29 PROCEDURE — 4010F PR ACE/ARB THEARPY RXD/TAKEN: ICD-10-PCS | Mod: CPTII,S$GLB,, | Performed by: PHYSICIAN ASSISTANT

## 2022-06-29 PROCEDURE — 1160F RVW MEDS BY RX/DR IN RCRD: CPT | Mod: CPTII,S$GLB,, | Performed by: PHYSICIAN ASSISTANT

## 2022-06-29 PROCEDURE — 99999 PR PBB SHADOW E&M-EST. PATIENT-LVL V: CPT | Mod: PBBFAC,,, | Performed by: PHYSICIAN ASSISTANT

## 2022-06-29 PROCEDURE — 3066F NEPHROPATHY DOC TX: CPT | Mod: CPTII,S$GLB,, | Performed by: PHYSICIAN ASSISTANT

## 2022-06-29 PROCEDURE — 3066F PR DOCUMENTATION OF TREATMENT FOR NEPHROPATHY: ICD-10-PCS | Mod: CPTII,S$GLB,, | Performed by: PHYSICIAN ASSISTANT

## 2022-06-29 PROCEDURE — 1159F PR MEDICATION LIST DOCUMENTED IN MEDICAL RECORD: ICD-10-PCS | Mod: CPTII,S$GLB,, | Performed by: PHYSICIAN ASSISTANT

## 2022-06-29 PROCEDURE — 3044F PR MOST RECENT HEMOGLOBIN A1C LEVEL <7.0%: ICD-10-PCS | Mod: CPTII,S$GLB,, | Performed by: PHYSICIAN ASSISTANT

## 2022-06-29 PROCEDURE — 99999 PR PBB SHADOW E&M-EST. PATIENT-LVL V: ICD-10-PCS | Mod: PBBFAC,,, | Performed by: PHYSICIAN ASSISTANT

## 2022-06-29 PROCEDURE — 3044F HG A1C LEVEL LT 7.0%: CPT | Mod: CPTII,S$GLB,, | Performed by: PHYSICIAN ASSISTANT

## 2022-06-29 PROCEDURE — 4010F ACE/ARB THERAPY RXD/TAKEN: CPT | Mod: CPTII,S$GLB,, | Performed by: PHYSICIAN ASSISTANT

## 2022-06-29 RX ORDER — RIZATRIPTAN BENZOATE 5 MG/1
5 TABLET, ORALLY DISINTEGRATING ORAL 2 TIMES DAILY PRN
Qty: 9 TABLET | Refills: 3 | Status: SHIPPED | OUTPATIENT
Start: 2022-06-29 | End: 2022-08-09 | Stop reason: CLARIF

## 2022-06-29 RX ORDER — PREDNISONE 5 MG/1
TABLET ORAL
Qty: 21 TABLET | Refills: 0 | Status: SHIPPED | OUTPATIENT
Start: 2022-06-29 | End: 2022-07-05

## 2022-06-29 RX ORDER — ONDANSETRON 4 MG/1
8 TABLET, ORALLY DISINTEGRATING ORAL EVERY 12 HOURS PRN
Qty: 30 TABLET | Refills: 1 | Status: ON HOLD | OUTPATIENT
Start: 2022-06-29 | End: 2022-12-23 | Stop reason: SDUPTHER

## 2022-06-29 RX ORDER — RIMEGEPANT SULFATE 75 MG/75MG
75 TABLET, ORALLY DISINTEGRATING ORAL EVERY OTHER DAY
Qty: 16 TABLET | Refills: 6 | Status: SHIPPED | OUTPATIENT
Start: 2022-06-29 | End: 2022-08-09 | Stop reason: CLARIF

## 2022-06-29 NOTE — PROGRESS NOTES
Name: Earl Abdul  Date: 06/29/2022  YOB: 1960      Subjective     Chief Complaint- Headache    HPI:   Earl Abdul is a 62 y.o. with HLD, HTN, COPD, Sarcoidosis, fibromyalgia, and VINICIO who presents to clinic for evaluation of frequent headaches. Of note the patient has been to the ED twice for this problem. She is also being followed by heme/onc for concern regarding blood results which suggested leukemia but still awaiting final report.     Headache history:   Age of onset -  In her 30s but this is significantly worse about 5 weeks ago  Location - Frontal bilateral, radiating mainly to the R side  Nature of pain - Dull and throbbing    Aura - Denies   Duration of headache - All day unremitting for the past 5 weeks  Pain scale - range of intensity -  8-10/10  Frequency -  This is the first time she has had one lasting this long. Having daily headaches for over a month.  Status Migrainosus history - Endorses that patient has been having this current headache for 5 weeks  Time of day of most headaches- anytime     Associated symptoms with the headache, bolded items are positive:   Meningeal symptoms - photophobia, phonophobia, exercise intolerance   Nausea/vomitting  Nasal drainage   Visual blurriness   Pallor/flushing  Dizziness   Vertigo  Confusion  Impaired concentration   Pain worsened with physical activity   Neck pain     Cluster headache symptoms, bolded items are positive:   Swollen or droopy eyelid  Swelling under or around the eye (may affect both eyes)  Excessive tearing  Red eye  Rhinorrhea or one-sided nasal congestion   Red, flushed face   Forehead and facial sweating    Symptoms of increased intracranial pressure, bolded items are positive:   Whooshing sounds   Visual spots/blotches     Basilar migraine symptoms, bolded items are positive:  Dysarthria  Vertigo  Tinnitus  Hypacusia  Diplopia  Simultaneous visual symptoms in both temporal and nasal fields of both  eyes  Ataxia  Reduced level of consciousness  Bilateral paresthesias    Headache Triggers, bolded items are positive:  Bright or flickering light  Strong smell  Vigorous exercise  Dietary factors   Visual strain   Weather changes  Exertion  Heat (hot weather, hot baths or showers)  Menses      Treatment history:  Resolution of headache with sleep - No   Meds tried:  Imitrex  Fioricet      Headache risk factors:   H/o TBI  - no   H/o Meningitis  - no   F/h Aneurysms  - no      PAST MEDICAL HISTORY:  Past Medical History:   Diagnosis Date    Anemia     Anemia     Controlled type 2 diabetes mellitus without complication, without long-term current use of insulin 11/30/2021    COPD (chronic obstructive pulmonary disease)     Depression     Diverticulitis     Fatty liver     GERD (gastroesophageal reflux disease)     Hyperlipidemia     Hypertension     Pancreatitis     Peptic ulcer disease     Polysubstance abuse     Posterior reversible encephalopathy syndrome     Sarcoidosis of lung     Sarcoidosis of lung     over 30 yrs ago    Seizures     7/2017    Suicide attempt     Suicide ideation        PAST SURGICAL HISTORY:  Past Surgical History:   Procedure Laterality Date    APPENDECTOMY      BILATERAL MASTECTOMY Bilateral 10/29/2020    Procedure: MASTECTOMY, BILATERAL;  Surgeon: Baylee Kevin MD;  Location: [address];  Service: General;  Laterality: Bilateral;    BREAST REVISION SURGERY Bilateral 2/11/2021    Procedure: BREAST REVISION SURGERY;  Surgeon: Scottie Johnson MD;  Location: [address];  Service: Plastics;  Laterality: Bilateral;    COLONOSCOPY N/A 7/28/2017    Procedure: COLONOSCOPY;  Surgeon: Aaron Alvarado MD;  Location: [facility];  Service: Endoscopy;  Laterality: N/A;    ESOPHAGOGASTRODUODENOSCOPY  10/7/2016, 11/6/2014    2016 - gastritis, duodenitis, 2014 erosive gastritis    ESOPHAGOGASTRODUODENOSCOPY N/A 2/11/2020    Procedure: ESOPHAGOGASTRODUODENOSCOPY (EGD);   Surgeon: Fawn Garrido MD;  Location: [facility];  Service: Endoscopy;  Laterality: N/A;    ESOPHAGOGASTRODUODENOSCOPY N/A 4/19/2021    Procedure: EGD (ESOPHAGOGASTRODUODENOSCOPY);  Surgeon: Paramjit Martino MD;  Location: [facility] ENDO;  Service: Endoscopy;  Laterality: N/A;    FLEXIBLE SIGMOIDOSCOPY  11/06/2014    colitis    HYSTERECTOMY      INJECTION FOR SENTINEL NODE IDENTIFICATION Right 10/29/2020    Procedure: INJECTION, FOR SENTINEL NODE IDENTIFICATION;  Surgeon: Baylee Kevin MD;  Location: [facility] OR [address] [place] [address];  Service: General;  Laterality: Right;    INJECTION OF JOINT Right 10/10/2019    Procedure: Injection, Joint RIGHT ILIOPSOAS BURSA/TENDON INJECTION AND RIGHT GLUTEAL TENDON INJECTION WITH STEROID AND LIDOCAINE;  Surgeon: Guillaume Rico MD;  Location: [facility] [place], operated by [facility];  Service: Pain Management;  Laterality: Right;  NEEDS CONSENT    INSERTION OF BREAST TISSUE EXPANDER Bilateral 10/29/2020    Procedure: INSERTION, TISSUE EXPANDER, BREAST;  Surgeon: Scottie Johnson MD;  Location: [address];  Service: Plastics;  Laterality: Bilateral;  Right breast: 1082 g  Left breast: 1076 g    LIPOSUCTION Bilateral 2/11/2021    Procedure: LIPOSUCTION;  Surgeon: Scottie Johnson MD;  Location: [address];  Service: Plastics;  Laterality: Bilateral;    mediastenoscopy      REPLACEMENT OF IMPLANT OF BREAST Bilateral 2/11/2021    Procedure: REPLACEMENT, IMPLANT, BREAST;  Surgeon: Scottie Johnson MD;  Location: [address];  Service: Plastics;  Laterality: Bilateral;    SENTINEL LYMPH NODE BIOPSY Right 10/29/2020    Procedure: BIOPSY, LYMPH NODE, SENTINEL;  Surgeon: Baylee Kevin MD;  Location: [facility] OR [address] [place] [address];  Service: General;  Laterality: Right;    TONSILLECTOMY N/A 1970    TUBAL LIGATION         CURRENT MEDS:  Current Outpatient Medications   Medication Sig Dispense Refill    anastrozole (ARIMIDEX) 1 mg Tab Take 1 tablet (1 mg total) by mouth once daily. (Patient taking  differently: Take 1 mg by mouth every morning.) 30 tablet 11    ARIPiprazole (ABILIFY) 10 MG Tab Take 10 mg by mouth every morning.      atorvastatin (LIPITOR) 10 MG tablet Take 10 mg by mouth every morning.      ATROVENT HFA 17 mcg/actuation inhaler Inhale 2 puffs into the lungs every 6 (six) hours as needed for Wheezing. 12.9 g 11    DULoxetine (CYMBALTA) 60 MG capsule Take 60 mg by mouth every evening.      gabapentin (NEURONTIN) 600 MG tablet Take 1 tablet (600 mg total) by mouth 2 (two) times daily. 180 tablet 3    levothyroxine (SYNTHROID) 50 MCG tablet Take 1 tablet (50 mcg total) by mouth before breakfast. 90 tablet 3    lisinopriL (PRINIVIL,ZESTRIL) 20 MG tablet Take 1 tablet (20 mg total) by mouth once daily. (Patient taking differently: Take 20 mg by mouth every morning.) 90 tablet 1    metFORMIN (GLUCOPHAGE-XR) 500 MG ER 24hr tablet Take 2 tablets (1,000 mg total) by mouth 2 (two) times daily with meals. 360 tablet 3    omega-3 fatty acids 1,000 mg Cap Take 1 capsule by mouth once daily.      pantoprazole (PROTONIX) 40 MG tablet Take 1 tablet (40 mg total) by mouth once daily. 30 tablet 0    semaglutide (OZEMPIC) 0.25 mg or 0.5 mg(2 mg/1.5 mL) pen injector Inject 0.5 mg into the skin every 7 days. Start with 0.25 mg weekly for 4 weeks and then increase to 0.5 mg if you are tolerating this dose (Patient taking differently: Inject 0.5 mg into the skin every 7 days. Start with 0.25 mg weekly for 4 weeks and then increase to 0.5 mg if you are tolerating this dose  SATURDAYS) 1.5 mL 11    tiotropium-olodateroL (STIOLTO RESPIMAT) 2.5-2.5 mcg/actuation Mist Inhale 1 puff into the lungs 2 (two) times a day. Controller 4 g 11    traMADoL (ULTRAM) 50 mg tablet Take 1 tablet (50 mg total) by mouth every 6 (six) hours. 5 tablet 0    traZODone (DESYREL) 300 MG tablet Take 1 tablet (300 mg total) by mouth nightly. 30 tablet 2    azithromycin (Z-DENVER) 250 MG tablet Take 1 tablet (250 mg total) by mouth  once daily. Take first 2 tablets together, then 1 every day until finished. 6 tablet 0    metoprolol succinate (TOPROL-XL) 25 MG 24 hr tablet Take 25 mg by mouth once daily.      ondansetron (ZOFRAN-ODT) 4 MG TbDL Take 2 tablets (8 mg total) by mouth every 12 (twelve) hours as needed (Nausea). 30 tablet 1    predniSONE (DELTASONE) 5 MG tablet Take 6 tablets (30 mg total) by mouth once daily for 1 day, THEN 5 tablets (25 mg total) once daily for 1 day, THEN 4 tablets (20 mg total) once daily for 1 day, THEN 3 tablets (15 mg total) once daily for 1 day, THEN 2 tablets (10 mg total) once daily for 1 day, THEN 1 tablet (5 mg total) once daily for 1 day. 21 tablet 0    rimegepant (NURTEC) 75 mg odt Take 1 tablet (75 mg total) by mouth every other day. Place ODT tablet on the tongue; alternatively the ODT tablet may be placed under the tongue 16 tablet 6    rizatriptan (MAXALT-MLT) 5 MG disintegrating tablet Take 1 tablet (5 mg total) by mouth 2 (two) times daily as needed for Migraine. 9 tablet 3     No current facility-administered medications for this visit.     Facility-Administered Medications Ordered in Other Visits   Medication Dose Route Frequency Provider Last Rate Last Admin    albuterol sulfate nebulizer solution 2.5 mg  2.5 mg Nebulization Once Andrew Rodriguez MD           ALLERGIES:  Review of patient's allergies indicates:   Allergen Reactions    Lortab [hydrocodone-acetaminophen] Itching    Promethazine Itching and Other (See Comments)       FAMILY HISTORY:  Family History   Problem Relation Age of Onset    Heart attack Father     Diabetes Father     Hypertension Father     Diabetes Mother     Hypertension Mother     Breast cancer Maternal Aunt     Colon cancer Maternal Uncle     Breast cancer Daughter     Ovarian cancer Neg Hx     Cancer Neg Hx        SOCIAL HISTORY:  Social History     Tobacco Use    Smoking status: Current Every Day Smoker     Packs/day: 0.50     Years: 30.00      Pack years: 15.00     Types: Vaping with nicotine, Cigarettes     Last attempt to quit: 2021     Years since quittin.4    Smokeless tobacco: Never Used   Substance Use Topics    Alcohol use: Yes     Comment: vodka daily (half a regular bottle)    Drug use: Yes     Types: Marijuana     Comment: gummies         Review of Systems:  Review of Systems   Constitutional: Negative for chills, fever and weight loss.   HENT: Positive for tinnitus. Negative for ear discharge, ear pain, hearing loss, sinus pain and sore throat.    Eyes: Positive for blurred vision. Negative for double vision and photophobia.   Respiratory: Negative for cough, shortness of breath and wheezing.    Cardiovascular: Negative for chest pain, palpitations and orthopnea.   Gastrointestinal: Positive for nausea and vomiting. Negative for constipation and diarrhea.   Genitourinary: Negative for dysuria, frequency and urgency.   Musculoskeletal: Positive for neck pain. Negative for back pain and myalgias.   Neurological: Positive for headaches. Negative for tingling, seizures, loss of consciousness and weakness.         Objective   There were no vitals filed for this visit.        NEUROLOGICAL EXAMINATION:     MENTAL STATUS   Oriented to person, place, and time.   Level of consciousness: alert          Latest Imaging 06/10/2022:    MRI BRAIN WITHOUT CONTRAST     CLINICAL HISTORY:  hx of PRES;     TECHNIQUE:  Multiplanar multisequence MR imaging of the brain was performed without intravenous contrast.     COMPARISON:  Head CT 06/10/2022, brain MRI 10/04/2017, 2017     FINDINGS:  Intracranial Compartment:     Ventricles are normal in size for age without evidence of hydrocephalus.     Ill-defined focus T2-FLAIR signal hyperintensity in the right periatrial white matter.  Few additional scattered punctate foci elsewhere within the supratentorial white matter.  These appear similar to the prior study of 10/04/2017.  No definite new focal  lesions.  No diffusion restriction to indicate an acute infarction.  No remote major vascular distribution infarct.  No recent or remote hemorrhage.  No mass effect or midline shift.     No extra-axial blood or fluid collections.     Normal vascular flow voids are preserved.     Skull/Extracranial Contents (limited evaluation):     Bone marrow signal intensity is normal.     Impression:     No evidence of acute intracranial pathology.        Electronically signed by: Miguel Malagon MD  Date:                                            06/10/2022  Time:                                           09:45      Assessment     Earl Abdul is a 62 y.o. female who presented to clinic today for the evaluation of frequent headaches.  .       Plan     My approach to every patient is multimodal.   I focused our discussion on addressing modifiable risk factors and trying to decrease them.  After discussion with the patient, I recommend the followin. Preventive medications; these medications have to be taken every single day to decrease headache frequency and severity; it takes at least minimum of few weeks to see any results; and have to be taken for at least 1 to 2 years. The medication should be started at a small dose, and increased if no significant side effects. Patient compliance is key for effectiveness of the preventive medications. (we discussed the options of beta-blockers, calcium channel blockers, SSRIs, SNRIs, neuron modulating like topiramate options)     Failed:   Gabapentin  Cymbalta    START:   Nurtec 75mg   Zofran     2. Acute (abortive) medications- these medications are to be taken only at the onset of severe headache and please limit a total of any combination of these medications to less than 6 days per month on average because they can cause rebound (medication overuse) headaches.     Failed:   Imitrex  Fioricet    START:   Maxalt     3. Natural approaches to increasing brain energy and  serotonin:   Magnesium 400 mg daily  Vitamin B2 400 mg daily   Vitamin B12 1000 mcg daily      4. DIET: I recommend a diet that heavily favors fruits and vegetables while reducing carbs. More information is available upon request.     5. EXERCISE: Gentle movement exercises, concentrate on combination of muscle building and aerobic. I also recommend adding gentle Yoga therapy. The goal is 150 minutes per week of moderate to rigorous exercise, but you should start at your own pace and progress slowly and safely as discussed today.    6. ANXIETY/STRESS- Control stressors with yoga, meditation, counseling, or other methods of mindfulness.     7. SLEEP- aim for 7-8 hrs of restful sleep per night.     8. FUN- Increase fun time spend with friends and family     Benefits, side effects, and alternatives were discussed extensively with the patient.?     Earl verbally endorsed understanding of all the recommendations.?     Follow up in 3 months      Problem List Items Addressed This Visit        Neuro    Migraine without aura and with status migrainosus, not intractable - Primary    Current Assessment & Plan     Patient has tried and failed   Preventive:    -Cymbalta   -Gabapentin  Abortive:    -Imitrex   -Fioricet    Due to the patient having tried and failed two different modalities to prevent migraines the patient from getting migraines it is medically necessary to try a CGRP inhibitor medication. Nurtec is medically necessary    Preventive-   Nurtec QOD    Abortive-   Maxalt     For nausea patient taking zofran    Also referred to sleep medicine for repeat evaluation of sleep apnea.            Relevant Medications    predniSONE (DELTASONE) 5 MG tablet    rizatriptan (MAXALT-MLT) 5 MG disintegrating tablet    rimegepant (NURTEC) 75 mg odt       Cardiac/Vascular    Essential hypertension    Current Assessment & Plan     Discussed with patient importance of management of hypertension due to secondary stroke risk. Patient is  on appropriate therapy currently managed by PCP.              Hyperlipidemia    Overview     Overview:   ICD-10 Transition           Current Assessment & Plan     Discussed with patient importance of management of hyperlipidemia due to secondary stroke risk. Patient is on appropriate therapy currently managed by PCP.                 Other    VINICIO (obstructive sleep apnea)    Current Assessment & Plan     Patient not currently using CPAP and states she has not been evaluated by sleep apnea for several years. Due to the patient stating she is waking up with headaches and this is likely due to some sleep apnea.              Other Visit Diagnoses     Frequent headaches        Relevant Orders    Ambulatory referral/consult to Sleep Disorders    Nausea        Relevant Medications    ondansetron (ZOFRAN-ODT) 4 MG TbDL            I spent a total of 63 minutes on the day of the visit. This includes face to face time and non-face to face time preparing to see the patient (eg, review of tests), obtaining and/or reviewing separately obtained history, documenting clinical information in the electronic or other health record, independently interpreting results and communicating results to the patient/family/caregiver, or care coordinator.      Maura Desai PA-C  Supervising Physician Bradford Bridges MD was avalible for all questions during this exam.  [facility]

## 2022-06-29 NOTE — ASSESSMENT & PLAN NOTE
Discussed with patient importance of management of hyperlipidemia due to secondary stroke risk. Patient is on appropriate therapy currently managed by PCP.

## 2022-06-29 NOTE — ASSESSMENT & PLAN NOTE
Patient has tried and failed   Preventive:    -Cymbalta   -Gabapentin  Abortive:    -Imitrex   -Fioricet    Due to the patient having tried and failed two different modalities to prevent migraines the patient from getting migraines it is medically necessary to try a CGRP inhibitor medication. Nurtec is medically necessary    Preventive-   Nurtec QOD    Abortive-   Maxalt     For nausea patient taking zofran    Also referred to sleep medicine for repeat evaluation of sleep apnea.

## 2022-06-29 NOTE — ASSESSMENT & PLAN NOTE
Patient not currently using CPAP and states she has not been evaluated by sleep apnea for several years. Due to the patient stating she is waking up with headaches and this is likely due to some sleep apnea.

## 2022-06-29 NOTE — TELEPHONE ENCOUNTER
----- Message from Kelley Jenkins sent at 6/29/2022  2:28 PM CDT -----  Regarding: Prior Authorization              Name of Who is Calling: Marjorie Shelby #[id number]    Who Left The Message: [facility] #[id number]      What is the request in detail:   Please provide a PRIOR AUTHORIZATION for the medication rimegepant (NURTEC) 75 mg odt.   Thank you!        Preferred Call Back  :   Marjorie Shelby #[id number] - [place] - [address] AT [facility]       Phone: [phone]  Fax:  [phone]

## 2022-06-29 NOTE — ASSESSMENT & PLAN NOTE
Discussed with patient importance of management of hypertension due to secondary stroke risk. Patient is on appropriate therapy currently managed by PCP.

## 2022-06-29 NOTE — PATIENT INSTRUCTIONS
Supplements for Migraine Prevention     There are several supplements which have been shown in clinical studies to be used for migraine prevention:    Magnesium 400mg daily    Riboflavin (Vitamin B2) 400mg daily    CoQ10 100mg three times daily

## 2022-06-30 ENCOUNTER — PATIENT MESSAGE (OUTPATIENT)
Dept: HEMATOLOGY/ONCOLOGY | Facility: CLINIC | Age: 62
End: 2022-06-30
Payer: COMMERCIAL

## 2022-06-30 ENCOUNTER — OFFICE VISIT (OUTPATIENT)
Dept: HEMATOLOGY/ONCOLOGY | Facility: CLINIC | Age: 62
End: 2022-06-30
Payer: COMMERCIAL

## 2022-06-30 VITALS
WEIGHT: 197.56 LBS | DIASTOLIC BLOOD PRESSURE: 55 MMHG | HEART RATE: 84 BPM | OXYGEN SATURATION: 95 % | SYSTOLIC BLOOD PRESSURE: 113 MMHG | HEIGHT: 66 IN | BODY MASS INDEX: 31.75 KG/M2 | RESPIRATION RATE: 18 BRPM

## 2022-06-30 DIAGNOSIS — E11.65 TYPE 2 DIABETES MELLITUS WITH HYPERGLYCEMIA, WITHOUT LONG-TERM CURRENT USE OF INSULIN: ICD-10-CM

## 2022-06-30 DIAGNOSIS — Z17.0 MALIGNANT NEOPLASM OF CENTRAL PORTION OF RIGHT BREAST IN FEMALE, ESTROGEN RECEPTOR POSITIVE: Primary | ICD-10-CM

## 2022-06-30 DIAGNOSIS — E78.5 HYPERLIPIDEMIA, UNSPECIFIED HYPERLIPIDEMIA TYPE: ICD-10-CM

## 2022-06-30 DIAGNOSIS — I10 ESSENTIAL HYPERTENSION: ICD-10-CM

## 2022-06-30 DIAGNOSIS — E03.9 HYPOTHYROIDISM, UNSPECIFIED TYPE: ICD-10-CM

## 2022-06-30 DIAGNOSIS — C50.111 MALIGNANT NEOPLASM OF CENTRAL PORTION OF RIGHT BREAST IN FEMALE, ESTROGEN RECEPTOR POSITIVE: Primary | ICD-10-CM

## 2022-06-30 PROCEDURE — 1160F RVW MEDS BY RX/DR IN RCRD: CPT | Mod: CPTII,S$GLB,, | Performed by: NURSE PRACTITIONER

## 2022-06-30 PROCEDURE — 3074F SYST BP LT 130 MM HG: CPT | Mod: CPTII,S$GLB,, | Performed by: NURSE PRACTITIONER

## 2022-06-30 PROCEDURE — 4010F PR ACE/ARB THEARPY RXD/TAKEN: ICD-10-PCS | Mod: CPTII,S$GLB,, | Performed by: NURSE PRACTITIONER

## 2022-06-30 PROCEDURE — 99214 OFFICE O/P EST MOD 30 MIN: CPT | Mod: S$GLB,,, | Performed by: NURSE PRACTITIONER

## 2022-06-30 PROCEDURE — 3078F PR MOST RECENT DIASTOLIC BLOOD PRESSURE < 80 MM HG: ICD-10-PCS | Mod: CPTII,S$GLB,, | Performed by: NURSE PRACTITIONER

## 2022-06-30 PROCEDURE — 99999 PR PBB SHADOW E&M-EST. PATIENT-LVL V: CPT | Mod: PBBFAC,,, | Performed by: NURSE PRACTITIONER

## 2022-06-30 PROCEDURE — 3044F PR MOST RECENT HEMOGLOBIN A1C LEVEL <7.0%: ICD-10-PCS | Mod: CPTII,S$GLB,, | Performed by: NURSE PRACTITIONER

## 2022-06-30 PROCEDURE — 3044F HG A1C LEVEL LT 7.0%: CPT | Mod: CPTII,S$GLB,, | Performed by: NURSE PRACTITIONER

## 2022-06-30 PROCEDURE — 1159F PR MEDICATION LIST DOCUMENTED IN MEDICAL RECORD: ICD-10-PCS | Mod: CPTII,S$GLB,, | Performed by: NURSE PRACTITIONER

## 2022-06-30 PROCEDURE — 3078F DIAST BP <80 MM HG: CPT | Mod: CPTII,S$GLB,, | Performed by: NURSE PRACTITIONER

## 2022-06-30 PROCEDURE — 1160F PR REVIEW ALL MEDS BY PRESCRIBER/CLIN PHARMACIST DOCUMENTED: ICD-10-PCS | Mod: CPTII,S$GLB,, | Performed by: NURSE PRACTITIONER

## 2022-06-30 PROCEDURE — 1159F MED LIST DOCD IN RCRD: CPT | Mod: CPTII,S$GLB,, | Performed by: NURSE PRACTITIONER

## 2022-06-30 PROCEDURE — 99214 PR OFFICE/OUTPT VISIT, EST, LEVL IV, 30-39 MIN: ICD-10-PCS | Mod: S$GLB,,, | Performed by: NURSE PRACTITIONER

## 2022-06-30 PROCEDURE — 99999 PR PBB SHADOW E&M-EST. PATIENT-LVL V: ICD-10-PCS | Mod: PBBFAC,,, | Performed by: NURSE PRACTITIONER

## 2022-06-30 PROCEDURE — 3066F NEPHROPATHY DOC TX: CPT | Mod: CPTII,S$GLB,, | Performed by: NURSE PRACTITIONER

## 2022-06-30 PROCEDURE — 3074F PR MOST RECENT SYSTOLIC BLOOD PRESSURE < 130 MM HG: ICD-10-PCS | Mod: CPTII,S$GLB,, | Performed by: NURSE PRACTITIONER

## 2022-06-30 PROCEDURE — 3061F PR NEG MICROALBUMINURIA RESULT DOCUMENTED/REVIEW: ICD-10-PCS | Mod: CPTII,S$GLB,, | Performed by: NURSE PRACTITIONER

## 2022-06-30 PROCEDURE — 3008F PR BODY MASS INDEX (BMI) DOCUMENTED: ICD-10-PCS | Mod: CPTII,S$GLB,, | Performed by: NURSE PRACTITIONER

## 2022-06-30 PROCEDURE — 3061F NEG MICROALBUMINURIA REV: CPT | Mod: CPTII,S$GLB,, | Performed by: NURSE PRACTITIONER

## 2022-06-30 PROCEDURE — 3008F BODY MASS INDEX DOCD: CPT | Mod: CPTII,S$GLB,, | Performed by: NURSE PRACTITIONER

## 2022-06-30 PROCEDURE — 3066F PR DOCUMENTATION OF TREATMENT FOR NEPHROPATHY: ICD-10-PCS | Mod: CPTII,S$GLB,, | Performed by: NURSE PRACTITIONER

## 2022-06-30 PROCEDURE — 4010F ACE/ARB THERAPY RXD/TAKEN: CPT | Mod: CPTII,S$GLB,, | Performed by: NURSE PRACTITIONER

## 2022-06-30 RX ORDER — ANASTROZOLE 1 MG/1
1 TABLET ORAL EVERY MORNING
Qty: 90 TABLET | Refills: 3 | Status: ON HOLD | OUTPATIENT
Start: 2022-06-30 | End: 2023-04-30 | Stop reason: HOSPADM

## 2022-07-07 ENCOUNTER — PATIENT MESSAGE (OUTPATIENT)
Dept: SURGERY | Facility: HOSPITAL | Age: 62
End: 2022-07-07
Payer: COMMERCIAL

## 2022-07-08 ENCOUNTER — TELEPHONE (OUTPATIENT)
Dept: UROLOGY | Facility: CLINIC | Age: 62
End: 2022-07-08
Payer: COMMERCIAL

## 2022-07-08 NOTE — TELEPHONE ENCOUNTER
Neuromodulation Staged Testing Update Note    7/8/2022    Indications for SNM testing:  frequency, urgency, urgency incontinence and fecal incontinence.       Previous Therapies     Behavioral Therapy: (pelvic floor exercises fluid/dietary modification timed voiding/bladder training) -  provided no benefit     Summary of Staged Test:    Baseline During SNM Staged Test   Daytime Urinary Frequency: 14x daily Daytime Frequency: 5-6x daily   Noturia: 3-4x Nocturia: 1-2x   UUI Episodes: 5x UUI Episodes: 0   Fecal Incontinence: 5x Fecal Incontinence: 1-2x   Daily Pads: 5/day Daily Pads: 1/day   Voiding Difficulty: no Voiding Difficulty: no     Over the last week of testing, the patient has shown at least 50% improvement over baseline symptoms.    The patient has recorded at least a 50% decrease in incontinence episodes averaged daily over a 3-day diary improving from 5 episodes to 0 per day.    The patient has recorded at least a 50% decrease in 24 hour urinary frequency averaged daily over a 3-day diary improving from 17voids to 8 voids per 24 hours.    The patient has recorded at least a 50% decrease in nocturia episodes averaged daily over a 3-day diary improving from 4 voids to 2 voids per night.

## 2022-07-11 ENCOUNTER — PATIENT MESSAGE (OUTPATIENT)
Dept: UROLOGY | Facility: CLINIC | Age: 62
End: 2022-07-11
Payer: COMMERCIAL

## 2022-07-11 ENCOUNTER — ANESTHESIA EVENT (OUTPATIENT)
Dept: SURGERY | Facility: HOSPITAL | Age: 62
End: 2022-07-11
Payer: COMMERCIAL

## 2022-07-11 NOTE — ANESTHESIA PREPROCEDURE EVALUATION
[facility]  Anesthesia Pre-Operative Evaluation         Patient Name: Earl Abdul  YOB: 1960  MRN: [id number]    SUBJECTIVE:     Pre-operative evaluation for Procedure(s) (LRB):  INSERTION, NEUROSTIMULATOR, PERMANENT, SACRAL (N/A)     07/11/2022    Earl Abdul is a 62 y.o. female w/ a significant PMHx of ETOH use disorder (1/2 pint/day), polysubstance abuse (tobacco, cannabis), Breast cancer (s/p b/l mastectomy in 2020), sarcoidosis, erythema nodosum, T2DM, COPD, VINICIO, HTN, HLD, MDD/Anxiety, Hypothyroidism, Obesity.    Patient now presents for the above procedure(s).      LDA: None.     Prev airway:   Intubation     Date/Time: 6/28/2022 7:17 AM  Performed by: Justyn Berger DO  Authorized by: Reginaldo Camejo MD      Intubation:     Induction:  Intravenous    Intubated:  Postinduction    Mask Ventilation:  Easy mask    Attempts:  1    Attempted By:  Resident anesthesiologist    Method of Intubation:  Direct    Blade:  Chu 2    Laryngeal View Grade: Grade I - full view of cords      Difficult Airway Encountered?: No      Complications:  None    Airway Device:  Oral endotracheal tube    Airway Device Size:  7.0    Style/Cuff Inflation:  Cuffed (inflated to minimal occlusive pressure)    Placement Verified By:  Capnometry    Complicating Factors:  None    Findings Post-Intubation:  BS equal bilateral and atraumatic/condition of teeth unchanged       Drips: None.      Patient Active Problem List   Diagnosis    Alcohol use disorder, severe, dependence    Alcohol withdrawal    Essential hypertension    Fibromyalgia    Tobacco abuse    Cannabis abuse, in remission    Sarcoidosis    History of Clostridium difficile colitis    Diarrhea    Dehydration    History of substance abuse    Pyelonephritis due to Escherichia coli    New onset seizure    Posterior reversible encephalopathy syndrome    Depression with anxiety    Erythema nodosum    History of sarcoidosis     Hyperlipidemia    Ramírez's syndrome    Degenerative joint disease (DJD) of lumbar spine    Decreased ROM of lumbar spine    Difficulty walking    VINICIO (obstructive sleep apnea)    At risk for lymphedema    Malignant neoplasm of central portion of right breast in female, estrogen receptor positive    COPD (chronic obstructive pulmonary disease)    Incontinence of feces    Low serum vitamin B12    Anemia    Urge incontinence of urine    Hypothyroidism    Type 2 diabetes mellitus with hyperglycemia    Obesity (BMI 30.0-34.9)    Fecal incontinence    Mixed incontinence    Pelvic floor tension    Chronic hypoxemic respiratory failure    COVID-19    Hypoxia    Shortness of breath    Alcoholic ketoacidosis    E coli bacteremia    Lymphocytosis    Migraine without aura and with status migrainosus, not intractable       Review of patient's allergies indicates:   Allergen Reactions    Lortab [hydrocodone-acetaminophen] Itching    Promethazine Itching and Other (See Comments)       Current Inpatient Medications:      Current Facility-Administered Medications on File Prior to Encounter   Medication Dose Route Frequency Provider Last Rate Last Admin    albuterol sulfate nebulizer solution 2.5 mg  2.5 mg Nebulization Once Andrew Rodriguez MD         Current Outpatient Medications on File Prior to Encounter   Medication Sig Dispense Refill    ARIPiprazole (ABILIFY) 10 MG Tab Take 10 mg by mouth every morning.      atorvastatin (LIPITOR) 10 MG tablet Take 10 mg by mouth every morning.      ATROVENT HFA 17 mcg/actuation inhaler Inhale 2 puffs into the lungs every 6 (six) hours as needed for Wheezing. 12.9 g 11    DULoxetine (CYMBALTA) 60 MG capsule Take 60 mg by mouth every evening.      gabapentin (NEURONTIN) 600 MG tablet Take 1 tablet (600 mg total) by mouth 2 (two) times daily. 180 tablet 3    levothyroxine (SYNTHROID) 50 MCG tablet Take 1 tablet (50 mcg total) by mouth before breakfast.  90 tablet 3    lisinopriL (PRINIVIL,ZESTRIL) 20 MG tablet Take 1 tablet (20 mg total) by mouth once daily. (Patient taking differently: Take 20 mg by mouth every morning.) 90 tablet 1    metFORMIN (GLUCOPHAGE-XR) 500 MG ER 24hr tablet Take 2 tablets (1,000 mg total) by mouth 2 (two) times daily with meals. 360 tablet 3    omega-3 fatty acids 1,000 mg Cap Take 1 capsule by mouth once daily.      pantoprazole (PROTONIX) 40 MG tablet Take 1 tablet (40 mg total) by mouth once daily. 30 tablet 0    semaglutide (OZEMPIC) 0.25 mg or 0.5 mg(2 mg/1.5 mL) pen injector Inject 0.5 mg into the skin every 7 days. Start with 0.25 mg weekly for 4 weeks and then increase to 0.5 mg if you are tolerating this dose (Patient taking differently: Inject 0.5 mg into the skin every 7 days. Start with 0.25 mg weekly for 4 weeks and then increase to 0.5 mg if you are tolerating this dose  SATURDAYS) 1.5 mL 11    tiotropium-olodateroL (STIOLTO RESPIMAT) 2.5-2.5 mcg/actuation Mist Inhale 1 puff into the lungs 2 (two) times a day. Controller 4 g 11       Past Surgical History:   Procedure Laterality Date    APPENDECTOMY      BILATERAL MASTECTOMY Bilateral 10/29/2020    Procedure: MASTECTOMY, BILATERAL;  Surgeon: Baylee Kevin MD;  Location: [facility] OR [address] [place] [address];  Service: General;  Laterality: Bilateral;    BREAST REVISION SURGERY Bilateral 2/11/2021    Procedure: BREAST REVISION SURGERY;  Surgeon: Scottie Johnson MD;  Location: [address];  Service: Plastics;  Laterality: Bilateral;    COLONOSCOPY N/A 7/28/2017    Procedure: COLONOSCOPY;  Surgeon: Aaron Alvarado MD;  Location: [facility];  Service: Endoscopy;  Laterality: N/A;    ESOPHAGOGASTRODUODENOSCOPY  10/7/2016, 11/6/2014    2016 - gastritis, duodenitis, 2014 erosive gastritis    ESOPHAGOGASTRODUODENOSCOPY N/A 2/11/2020    Procedure: ESOPHAGOGASTRODUODENOSCOPY (EGD);  Surgeon: Fawn Garrido MD;  Location: [facility];  Service: Endoscopy;  Laterality: N/A;     ESOPHAGOGASTRODUODENOSCOPY N/A 4/19/2021    Procedure: EGD (ESOPHAGOGASTRODUODENOSCOPY);  Surgeon: Paramjit Martino MD;  Location: [facility];  Service: Endoscopy;  Laterality: N/A;    FLEXIBLE SIGMOIDOSCOPY  11/06/2014    colitis    HYSTERECTOMY      INJECTION FOR SENTINEL NODE IDENTIFICATION Right 10/29/2020    Procedure: INJECTION, FOR SENTINEL NODE IDENTIFICATION;  Surgeon: Baylee Kevin MD;  Location: [facility] OR [address] [place] [address];  Service: General;  Laterality: Right;    INJECTION OF JOINT Right 10/10/2019    Procedure: Injection, Joint RIGHT ILIOPSOAS BURSA/TENDON INJECTION AND RIGHT GLUTEAL TENDON INJECTION WITH STEROID AND LIDOCAINE;  Surgeon: Guillaume Rico MD;  Location: [facility];  Service: Pain Management;  Laterality: Right;  NEEDS CONSENT    INSERTION OF BREAST TISSUE EXPANDER Bilateral 10/29/2020    Procedure: INSERTION, TISSUE EXPANDER, BREAST;  Surgeon: Scottie Johnson MD;  Location: [address];  Service: Plastics;  Laterality: Bilateral;  Right breast: 1082 g  Left breast: 1076 g    LIPOSUCTION Bilateral 2/11/2021    Procedure: LIPOSUCTION;  Surgeon: Scottie Johnson MD;  Location: [facility] OR [address] [place] [address];  Service: Plastics;  Laterality: Bilateral;    mediastenoscopy      REPLACEMENT OF IMPLANT OF BREAST Bilateral 2/11/2021    Procedure: REPLACEMENT, IMPLANT, BREAST;  Surgeon: Scottie Johnson MD;  Location: [facility] OR [address] [place] [address];  Service: Plastics;  Laterality: Bilateral;    SENTINEL LYMPH NODE BIOPSY Right 10/29/2020    Procedure: BIOPSY, LYMPH NODE, SENTINEL;  Surgeon: Baylee Kevin MD;  Location: [address];  Service: General;  Laterality: Right;    TONSILLECTOMY N/A 1970    TUBAL LIGATION         Social History     Socioeconomic History    Marital status:    Tobacco Use    Smoking status: Current Every Day Smoker     Packs/day: 0.50     Years: 30.00     Pack years: 15.00     Types: Vaping with nicotine, Cigarettes     Last attempt to quit: 2/1/2021     Years  since quittin.4    Smokeless tobacco: Never Used   Substance and Sexual Activity    Alcohol use: Yes     Comment: vodka daily (half a regular bottle)    Drug use: Yes     Types: Marijuana     Comment: gummies    Sexual activity: Yes     Birth control/protection: Surgical       OBJECTIVE:     Vital Signs Range (Last 24H):         Significant Labs:  Lab Results   Component Value Date    WBC 7.35 2022    HGB 10.8 (L) 2022    HCT 35.8 (L) 2022     (L) 2022    CHOL 163 03/10/2022    TRIG 189 (H) 03/10/2022    HDL 47 03/10/2022    ALT 72 (H) 2022     (H) 2022     2022    K 3.5 2022     2022    CREATININE 0.9 2022    BUN 5 (L) 2022    CO2 25 2022    TSH 0.619 2022    INR 0.9 2022    HGBA1C 6.6 (H) 03/10/2022       Diagnostic Studies: No relevant studies.    EKG:   Results for orders placed or performed during the hospital encounter of 22   EKG 12-lead    Collection Time: 22 12:05 PM    Narrative    Test Reason : Z91.89,    Vent. Rate : 087 BPM     Atrial Rate : 087 BPM     P-R Int : 170 ms          QRS Dur : 092 ms      QT Int : 350 ms       P-R-T Axes : 081 050 043 degrees     QTc Int : 421 ms    Normal sinus rhythm  Normal ECG    Confirmed by Salvador Pittman MD ([id number]) on 5/3/2022 2:22:18 PM    Referred By: [email or web address]   SELF           Confirmed By:Salvador Pittman MD       2D ECHO:  22  · Overall the study quality was technically difficult. The study was difficult due to patient's body habitus and heart rhythm. Tachycardia was present during the study.  · The estimated ejection fraction is 65%.  · The left ventricle is normal in size with normal systolic function.  · Normal left ventricular diastolic function.  · Normal right ventricular size with normal right ventricular systolic function.  · Normal central venous pressure (3 mmHg).         ASSESSMENT/PLAN:       Pre-op Assessment    I have  reviewed the Patient Summary Reports.     I have reviewed the Nursing Notes. I have reviewed the NPO Status.   I have reviewed the Medications.     Review of Systems  Anesthesia Hx:  No problems with previous Anesthesia Denies Hx of Anesthetic complications  History of prior surgery of interest to airway management or planning: Previous anesthesia: General, MAC Denies Family Hx of Anesthesia complications.   Denies Personal Hx of Anesthesia complications.   Social:  Alcohol Use    Hematology/Oncology:         -- Cancer in past history: Breast bilateral axillary node dissection surgery    Cardiovascular:   Exercise tolerance: good Hypertension Denies CAD.    Denies CABG/stent.   Denies CHF. hyperlipidemia ECG has been reviewed. Sarcoidosis, erythema nodosum   Pulmonary:   COPD Denies Asthma. Sleep Apnea    Renal/:   Denies Chronic Renal Disease.     Hepatic/GI:   PUD, Denies GERD. Liver Disease,    Musculoskeletal:   Arthritis     Neurological:   Denies CVA. Neuromuscular Disease,   Denies Headaches. Denies Seizures.    Endocrine:   Diabetes, type 2 Hypothyroidism  Obesity / BMI > 30  Psych:   Denies Psychiatric History.          Physical Exam  General: Well nourished, Cooperative, Alert and Oriented    Airway:  Mallampati: II / II  Mouth Opening: Normal  TM Distance: Normal  Tongue: Normal  Neck ROM: Normal ROM    Dental:  Intact        Anesthesia Plan  Type of Anesthesia, risks & benefits discussed:    Anesthesia Type: MAC, Gen Natural Airway, Gen ETT  Intra-op Monitoring Plan: Standard ASA Monitors  Post Op Pain Control Plan: multimodal analgesia and IV/PO Opioids PRN  Induction:  IV  Airway Plan: Direct and Video, Post-Induction  Informed Consent: Informed consent signed with the Patient and all parties understand the risks and agree with anesthesia plan.  All questions answered.   ASA Score: 3  Day of Surgery Review of History & Physical: H&P Update referred to the surgeon/provider.    Ready For Surgery From  Anesthesia Perspective.     .

## 2022-07-12 ENCOUNTER — HOSPITAL ENCOUNTER (OUTPATIENT)
Facility: HOSPITAL | Age: 62
Discharge: HOME | End: 2022-07-12
Attending: UROLOGY | Admitting: UROLOGY
Payer: COMMERCIAL

## 2022-07-12 ENCOUNTER — ANESTHESIA (OUTPATIENT)
Dept: SURGERY | Facility: HOSPITAL | Age: 62
End: 2022-07-12
Payer: COMMERCIAL

## 2022-07-12 ENCOUNTER — RESEARCH ENCOUNTER (OUTPATIENT)
Dept: RESEARCH | Facility: HOSPITAL | Age: 62
End: 2022-07-12
Payer: COMMERCIAL

## 2022-07-12 ENCOUNTER — TELEPHONE (OUTPATIENT)
Dept: UROLOGY | Facility: CLINIC | Age: 62
End: 2022-07-12
Payer: COMMERCIAL

## 2022-07-12 VITALS
WEIGHT: 190 LBS | BODY MASS INDEX: 30.67 KG/M2 | HEART RATE: 87 BPM | SYSTOLIC BLOOD PRESSURE: 130 MMHG | OXYGEN SATURATION: 98 % | DIASTOLIC BLOOD PRESSURE: 63 MMHG | TEMPERATURE: 98 F | RESPIRATION RATE: 20 BRPM

## 2022-07-12 DIAGNOSIS — N32.81 OAB (OVERACTIVE BLADDER): Primary | ICD-10-CM

## 2022-07-12 LAB — POCT GLUCOSE: 103 MG/DL (ref 70–110)

## 2022-07-12 PROCEDURE — 71000044 HC DOSC ROUTINE RECOVERY FIRST HOUR: Performed by: UROLOGY

## 2022-07-12 PROCEDURE — D9220A PRA ANESTHESIA: ICD-10-PCS | Mod: CRNA,,, | Performed by: NURSE ANESTHETIST, CERTIFIED REGISTERED

## 2022-07-12 PROCEDURE — 36000706: Performed by: UROLOGY

## 2022-07-12 PROCEDURE — 25000003 PHARM REV CODE 250: Performed by: NURSE ANESTHETIST, CERTIFIED REGISTERED

## 2022-07-12 PROCEDURE — C1767 GENERATOR, NEURO NON-RECHARG: HCPCS | Performed by: UROLOGY

## 2022-07-12 PROCEDURE — 82962 GLUCOSE BLOOD TEST: CPT | Performed by: UROLOGY

## 2022-07-12 PROCEDURE — 64590 PR IMPLANT PERIPH/GASTRIC NEUROSTIM/RECEIVER: ICD-10-PCS | Mod: 58,,, | Performed by: UROLOGY

## 2022-07-12 PROCEDURE — 37000008 HC ANESTHESIA 1ST 15 MINUTES: Performed by: UROLOGY

## 2022-07-12 PROCEDURE — 71000015 HC POSTOP RECOV 1ST HR: Performed by: UROLOGY

## 2022-07-12 PROCEDURE — 63600175 PHARM REV CODE 636 W HCPCS: Performed by: NURSE ANESTHETIST, CERTIFIED REGISTERED

## 2022-07-12 PROCEDURE — 63600175 PHARM REV CODE 636 W HCPCS: Performed by: ANESTHESIOLOGY

## 2022-07-12 PROCEDURE — 37000009 HC ANESTHESIA EA ADD 15 MINS: Performed by: UROLOGY

## 2022-07-12 PROCEDURE — 95972 PR PRG SPNL GEN CMPLX: ICD-10-PCS | Mod: ,,, | Performed by: UROLOGY

## 2022-07-12 PROCEDURE — C1787 PATIENT PROGR, NEUROSTIM: HCPCS | Performed by: UROLOGY

## 2022-07-12 PROCEDURE — 25000003 PHARM REV CODE 250

## 2022-07-12 PROCEDURE — 36000707: Performed by: UROLOGY

## 2022-07-12 PROCEDURE — 64590 INS/RPL PRPH SAC/GSTR NPG/R: CPT | Mod: 58,,, | Performed by: UROLOGY

## 2022-07-12 PROCEDURE — D9220A PRA ANESTHESIA: Mod: CRNA,,, | Performed by: NURSE ANESTHETIST, CERTIFIED REGISTERED

## 2022-07-12 PROCEDURE — 25000003 PHARM REV CODE 250: Performed by: UROLOGY

## 2022-07-12 PROCEDURE — D9220A PRA ANESTHESIA: ICD-10-PCS | Mod: ANES,,, | Performed by: ANESTHESIOLOGY

## 2022-07-12 PROCEDURE — D9220A PRA ANESTHESIA: Mod: ANES,,, | Performed by: ANESTHESIOLOGY

## 2022-07-12 PROCEDURE — 95972 ALYS CPLX SP/PN NPGT W/PRGRM: CPT | Mod: ,,, | Performed by: UROLOGY

## 2022-07-12 DEVICE — SYS INTERSTIM X RECHARGE FREE: Type: IMPLANTABLE DEVICE | Site: BACK | Status: FUNCTIONAL

## 2022-07-12 RX ORDER — KETAMINE HCL IN 0.9 % NACL 50 MG/5 ML
SYRINGE (ML) INTRAVENOUS
Status: DISCONTINUED | OUTPATIENT
Start: 2022-07-12 | End: 2022-07-12

## 2022-07-12 RX ORDER — SODIUM CHLORIDE 0.9 % (FLUSH) 0.9 %
10 SYRINGE (ML) INJECTION
Status: DISCONTINUED | OUTPATIENT
Start: 2022-07-12 | End: 2022-07-12 | Stop reason: HOSPADM

## 2022-07-12 RX ORDER — LIDOCAINE HYDROCHLORIDE 10 MG/ML
1 INJECTION, SOLUTION EPIDURAL; INFILTRATION; INTRACAUDAL; PERINEURAL ONCE
Status: DISCONTINUED | OUTPATIENT
Start: 2022-07-12 | End: 2022-07-12 | Stop reason: HOSPADM

## 2022-07-12 RX ORDER — HYDROMORPHONE HYDROCHLORIDE 1 MG/ML
0.2 INJECTION, SOLUTION INTRAMUSCULAR; INTRAVENOUS; SUBCUTANEOUS
Status: DISCONTINUED | OUTPATIENT
Start: 2022-07-12 | End: 2022-07-12 | Stop reason: HOSPADM

## 2022-07-12 RX ORDER — MIDAZOLAM HYDROCHLORIDE 1 MG/ML
2 INJECTION INTRAMUSCULAR; INTRAVENOUS ONCE AS NEEDED
Status: COMPLETED | OUTPATIENT
Start: 2022-07-12 | End: 2022-07-12

## 2022-07-12 RX ORDER — PROPOFOL 10 MG/ML
VIAL (ML) INTRAVENOUS
Status: DISCONTINUED | OUTPATIENT
Start: 2022-07-12 | End: 2022-07-12

## 2022-07-12 RX ORDER — BUPIVACAINE HYDROCHLORIDE AND EPINEPHRINE 5; 5 MG/ML; UG/ML
INJECTION, SOLUTION EPIDURAL; INTRACAUDAL; PERINEURAL
Status: DISCONTINUED | OUTPATIENT
Start: 2022-07-12 | End: 2022-07-12 | Stop reason: HOSPADM

## 2022-07-12 RX ORDER — LIDOCAINE HYDROCHLORIDE 20 MG/ML
INJECTION INTRAVENOUS
Status: DISCONTINUED | OUTPATIENT
Start: 2022-07-12 | End: 2022-07-12

## 2022-07-12 RX ORDER — SODIUM CHLORIDE 9 MG/ML
INJECTION, SOLUTION INTRAVENOUS CONTINUOUS
Status: DISCONTINUED | OUTPATIENT
Start: 2022-07-12 | End: 2022-07-12 | Stop reason: HOSPADM

## 2022-07-12 RX ORDER — TRAMADOL HYDROCHLORIDE 50 MG/1
50 TABLET ORAL EVERY 6 HOURS
Qty: 5 TABLET | Refills: 0 | Status: SHIPPED | OUTPATIENT
Start: 2022-07-12 | End: 2022-09-06

## 2022-07-12 RX ORDER — MIDAZOLAM HYDROCHLORIDE 1 MG/ML
INJECTION, SOLUTION INTRAMUSCULAR; INTRAVENOUS
Status: DISCONTINUED | OUTPATIENT
Start: 2022-07-12 | End: 2022-07-12

## 2022-07-12 RX ORDER — DEXAMETHASONE SODIUM PHOSPHATE 4 MG/ML
INJECTION, SOLUTION INTRA-ARTICULAR; INTRALESIONAL; INTRAMUSCULAR; INTRAVENOUS; SOFT TISSUE
Status: DISCONTINUED | OUTPATIENT
Start: 2022-07-12 | End: 2022-07-12

## 2022-07-12 RX ORDER — SUCCINYLCHOLINE CHLORIDE 20 MG/ML
INJECTION INTRAMUSCULAR; INTRAVENOUS
Status: DISCONTINUED | OUTPATIENT
Start: 2022-07-12 | End: 2022-07-12

## 2022-07-12 RX ORDER — FENTANYL CITRATE 50 UG/ML
INJECTION, SOLUTION INTRAMUSCULAR; INTRAVENOUS
Status: DISCONTINUED | OUTPATIENT
Start: 2022-07-12 | End: 2022-07-12

## 2022-07-12 RX ORDER — CEFAZOLIN SODIUM/WATER 2 G/20 ML
2 SYRINGE (ML) INTRAVENOUS
Status: DISCONTINUED | OUTPATIENT
Start: 2022-07-12 | End: 2022-07-12 | Stop reason: HOSPADM

## 2022-07-12 RX ORDER — ONDANSETRON 4 MG/1
8 TABLET, ORALLY DISINTEGRATING ORAL 2 TIMES DAILY
Qty: 30 TABLET | Refills: 2 | Status: SHIPPED | OUTPATIENT
Start: 2022-07-12 | End: 2022-09-06 | Stop reason: SDUPTHER

## 2022-07-12 RX ORDER — KETOROLAC TROMETHAMINE 30 MG/ML
INJECTION, SOLUTION INTRAMUSCULAR; INTRAVENOUS
Status: DISCONTINUED | OUTPATIENT
Start: 2022-07-12 | End: 2022-07-12

## 2022-07-12 RX ORDER — HALOPERIDOL 5 MG/ML
0.5 INJECTION INTRAMUSCULAR EVERY 10 MIN PRN
Status: DISCONTINUED | OUTPATIENT
Start: 2022-07-12 | End: 2022-07-12 | Stop reason: HOSPADM

## 2022-07-12 RX ORDER — HALOPERIDOL 5 MG/ML
5 INJECTION INTRAMUSCULAR ONCE
Status: COMPLETED | OUTPATIENT
Start: 2022-07-12 | End: 2022-07-12

## 2022-07-12 RX ORDER — ONDANSETRON 2 MG/ML
INJECTION INTRAMUSCULAR; INTRAVENOUS
Status: DISCONTINUED | OUTPATIENT
Start: 2022-07-12 | End: 2022-07-12

## 2022-07-12 RX ORDER — ACETAMINOPHEN 500 MG
1000 TABLET ORAL ONCE
Status: COMPLETED | OUTPATIENT
Start: 2022-07-12 | End: 2022-07-12

## 2022-07-12 RX ADMIN — MIDAZOLAM HYDROCHLORIDE 2 MG: 1 INJECTION, SOLUTION INTRAMUSCULAR; INTRAVENOUS at 03:07

## 2022-07-12 RX ADMIN — Medication 20 MG: at 03:07

## 2022-07-12 RX ADMIN — DEXAMETHASONE SODIUM PHOSPHATE 8 MG: 4 INJECTION, SOLUTION INTRAMUSCULAR; INTRAVENOUS at 03:07

## 2022-07-12 RX ADMIN — FENTANYL CITRATE 25 MCG: 50 INJECTION, SOLUTION INTRAMUSCULAR; INTRAVENOUS at 03:07

## 2022-07-12 RX ADMIN — SUCCINYLCHOLINE CHLORIDE 120 MG: 20 INJECTION, SOLUTION INTRAMUSCULAR; INTRAVENOUS at 03:07

## 2022-07-12 RX ADMIN — Medication 200 MCG/KG/MIN: at 03:07

## 2022-07-12 RX ADMIN — LIDOCAINE HYDROCHLORIDE 100 MG: 20 INJECTION, SOLUTION INTRAVENOUS at 03:07

## 2022-07-12 RX ADMIN — PROPOFOL 150 MG: 10 INJECTION, EMULSION INTRAVENOUS at 03:07

## 2022-07-12 RX ADMIN — Medication 150 MCG/KG/MIN: at 03:07

## 2022-07-12 RX ADMIN — HALOPERIDOL LACTATE 5 MG: 5 INJECTION, SOLUTION INTRAMUSCULAR at 01:07

## 2022-07-12 RX ADMIN — PROPOFOL 50 MG: 10 INJECTION, EMULSION INTRAVENOUS at 03:07

## 2022-07-12 RX ADMIN — PROPOFOL 100 MG: 10 INJECTION, EMULSION INTRAVENOUS at 03:07

## 2022-07-12 RX ADMIN — MIDAZOLAM 2 MG: 1 INJECTION INTRAMUSCULAR; INTRAVENOUS at 02:07

## 2022-07-12 RX ADMIN — KETOROLAC TROMETHAMINE 30 MG: 30 INJECTION, SOLUTION INTRAMUSCULAR at 03:07

## 2022-07-12 RX ADMIN — ONDANSETRON 4 MG: 2 INJECTION INTRAMUSCULAR; INTRAVENOUS at 03:07

## 2022-07-12 RX ADMIN — SODIUM CHLORIDE: 9 INJECTION, SOLUTION INTRAVENOUS at 03:07

## 2022-07-12 RX ADMIN — SODIUM CHLORIDE: 0.9 INJECTION, SOLUTION INTRAVENOUS at 12:07

## 2022-07-12 RX ADMIN — ACETAMINOPHEN 1000 MG: 500 TABLET ORAL at 12:07

## 2022-07-12 NOTE — DISCHARGE SUMMARY
[facility]  Discharge Note  Short Stay    Admit Date: 7/12/2022    Discharge Date and Time: 07/12/2022 2:47 PM      Attending Physician: Juaquin Edwards MD     Discharge Provider: Munir Suazo MD    Diagnoses:  Active Hospital Problems    Diagnosis  POA    *OAB (overactive bladder) [N32.81]  Yes      Resolved Hospital Problems   No resolved problems to display.       Discharged Condition: stable    Hospital Course: Patient was admitted for stage II interstim and tolerated the procedure well with no complications. She was discharged home in good condition on the same day.       Final Diagnoses: Same as principal problem.    Disposition: Home or Self Care    Follow up/Patient Instructions:    Medications:  Reconciled Home Medications:   Current Discharge Medication List      START taking these medications    Details   !! ondansetron (ZOFRAN-ODT) 4 MG TbDL Take 2 tablets (8 mg total) by mouth 2 (two) times daily.  Qty: 30 tablet, Refills: 2      !! traMADoL (ULTRAM) 50 mg tablet Take 1 tablet (50 mg total) by mouth every 6 (six) hours.  Qty: 5 tablet, Refills: 0    Comments: Quantity prescribed more than 7 day supply? No       !! - Potential duplicate medications found. Please discuss with provider.      CONTINUE these medications which have NOT CHANGED    Details   anastrozole (ARIMIDEX) 1 mg Tab Take 1 tablet (1 mg total) by mouth every morning.  Qty: 90 tablet, Refills: 3    Associated Diagnoses: Malignant neoplasm of central portion of right breast in female, estrogen receptor positive      ARIPiprazole (ABILIFY) 10 MG Tab Take 10 mg by mouth every morning.      atorvastatin (LIPITOR) 10 MG tablet Take 10 mg by mouth every morning.      DULoxetine (CYMBALTA) 60 MG capsule Take 60 mg by mouth every evening.      gabapentin (NEURONTIN) 600 MG tablet Take 1 tablet (600 mg total) by mouth 2 (two) times daily.  Qty: 180 tablet, Refills: 3      levothyroxine (SYNTHROID) 50 MCG tablet Take 1 tablet (50  mcg total) by mouth before breakfast.  Qty: 90 tablet, Refills: 3      metFORMIN (GLUCOPHAGE-XR) 500 MG ER 24hr tablet Take 2 tablets (1,000 mg total) by mouth 2 (two) times daily with meals.  Qty: 360 tablet, Refills: 3    Associated Diagnoses: Uncontrolled type 2 diabetes mellitus with hyperglycemia      omega-3 fatty acids 1,000 mg Cap Take 1 capsule by mouth once daily.      !! ondansetron (ZOFRAN-ODT) 4 MG TbDL Take 2 tablets (8 mg total) by mouth every 12 (twelve) hours as needed (Nausea).  Qty: 30 tablet, Refills: 1    Associated Diagnoses: Nausea      pantoprazole (PROTONIX) 40 MG tablet Take 1 tablet (40 mg total) by mouth once daily.  Qty: 30 tablet, Refills: 0      rimegepant (NURTEC) 75 mg odt Take 1 tablet (75 mg total) by mouth every other day. Place ODT tablet on the tongue; alternatively the ODT tablet may be placed under the tongue  Qty: 16 tablet, Refills: 6    Associated Diagnoses: Migraine without aura and with status migrainosus, not intractable      traZODone (DESYREL) 300 MG tablet Take 1 tablet (300 mg total) by mouth nightly.  Qty: 30 tablet, Refills: 2    Associated Diagnoses: Insomnia, unspecified type      ATROVENT HFA 17 mcg/actuation inhaler Inhale 2 puffs into the lungs every 6 (six) hours as needed for Wheezing.  Qty: 12.9 g, Refills: 11      lisinopriL (PRINIVIL,ZESTRIL) 20 MG tablet Take 1 tablet (20 mg total) by mouth once daily.  Qty: 90 tablet, Refills: 1    Comments: .      rizatriptan (MAXALT-MLT) 5 MG disintegrating tablet Take 1 tablet (5 mg total) by mouth 2 (two) times daily as needed for Migraine.  Qty: 9 tablet, Refills: 3    Associated Diagnoses: Migraine without aura and with status migrainosus, not intractable      semaglutide (OZEMPIC) 0.25 mg or 0.5 mg(2 mg/1.5 mL) pen injector Inject 0.5 mg into the skin every 7 days. Start with 0.25 mg weekly for 4 weeks and then increase to 0.5 mg if you are tolerating this dose  Qty: 1.5 mL, Refills: 11    Associated Diagnoses:  Uncontrolled type 2 diabetes mellitus with hyperglycemia      tiotropium-olodateroL (STIOLTO RESPIMAT) 2.5-2.5 mcg/actuation Mist Inhale 1 puff into the lungs 2 (two) times a day. Controller  Qty: 4 g, Refills: 11      !! traMADoL (ULTRAM) 50 mg tablet Take 1 tablet (50 mg total) by mouth every 6 (six) hours.  Qty: 5 tablet, Refills: 0    Comments: Quantity prescribed more than 7 day supply? No       !! - Potential duplicate medications found. Please discuss with provider.        Discharge Procedure Orders   Diet Adult Regular     Activity as tolerated      Follow-up Information     Juaquin Edwards MD Follow up on 8/26/2022.    Specialty: Urology  Why: f/u john  Contact information:  Jassi RAMIREZ  [facility] [address] [phone]

## 2022-07-12 NOTE — OP NOTE
Neuromodulation Operative Note    Date: 7/12/22    Preoperative Diagnosis:   1. Urinary frequency  2. Urinary urgency  3. Urgency incontinence  4. Fecal incontinence    Postoperative Diagnosis:  1. Urinary frequency  2. Urinary urgency  3. Urgency incontinence  4. Fecal incontinence    Procedure:  1. Insertion of implantable pulse generator for neurostimulation (08887)  2. Complex analysis and programming of implanted neurostimulator pulse generator (71976)    Attending Surgeon: Juaquin Edwards MD    Anesthesia: Monitored Anesthesia Care    EBL: <10 mL    Complications: None    Findings:   1. IPG Placement: right  2. Placement of Interstim X generator    Reason for Procedure: Earl Abdul is a very pleasant 62 y.o. female with a history of frequency, urgency, urgency incontinence and fecal incontinence. Patient has attempted previous, more conservative, behavioral therapies in the form of dietary modification, fluid moderation and timed voiding .  Over the last 2 weeks of testing, effects of SNM have been monitored for patient response. The patient has recorded at least a 50% decrease in incontinence episodes averaged daily over a 3-day diary improving from 5 episodes to 0 per day. The patient has recorded at least a 50% decrease in 24 hour urinary frequency averaged daily over a 3-day diary improving from 17 voids to 8 voids per 24 hours. The patient has recorded at least a 50% decrease in nocturia episodes averaged daily over a 3-day diary improving from 4 voids to 2 voids per night. Given that the patient has demonstrated clinically significant response to testing of sacral neuromodulation, and desires permanent implantation, it is appropriate to proceed with implantation of an implantable pulse generator.     PROCEDURE IN DETAIL: Informed consent was obtained by explaining all risks and benefits of the procedure to the patient in detail. She was given appropriate perioperative antibiotics within 30  "minutes prior to beginning the procedure. She was brought to the OR suite, where monitored anesthesia care was administered by anesthesia staff in a prone position. She was prepped and draped in accordance with our standard sacral neuromodulation infection protocol, which was utilized preoperatively, intraoperatively, and postoperatively.     A #15 scalpel blade was used to open the percutaneous extension incision and the percutaneous extension was delivered. This was  from the lead, cut and delivered sterilely from the field. The quadripolar lead was then attached to the Interstim X implantable pulse generator, which was pre-placed within the pocket. The single setscrew was tightened until a single "click" was heard.  The pocket had been thoroughly irrigated with sterile water. After impedance testing demonstrated no abnormalities on all electrode pairs, the incision was closed in 2 layers, deeply with a 2-0 Vicryl and superficially with a running 3-0 Monocryl. The incision was covered with Dermabond. She tolerated the procedure well and was transferred in stable condition to the recovery room.     Once awake and alert, complex electronic analysis and programming of the SNM pulse generator was performed, including setting active contact groups, amplitude, pulse width, frequency, cycling, and lockout parameters.     POSTOPERATIVE PLAN: We will plan to see her back in 6-8 weeks for continued evaluation.     Munir Suazo MD  "

## 2022-07-12 NOTE — PROGRESS NOTES
Notified Dr. Leopold pt was nauseated and vomiting this morning.  Pt is complaining of nausea now.  Dr. Leopold said she would put in orders.

## 2022-07-12 NOTE — PATIENT INSTRUCTIONS
Interstim Post-Operative Care    You play an important role in your own recovery. You will be given a daily diary to document the  effectiveness of the Interstim implant.     Caring for Your Wound   After surgery you will have a dressing over the surgical site.  Do not remove or change the dressing. If your dressing becomes saturated or undone, you  may reinforce it with more tape and/or gauze but you should call the office to be evaluated.    Post Surgical Pain Management   It is normal to experience some discomfort post operatively, however the stimulation should not  be painful.   Take Tylenol 650mg 1-2 tabs every 4-6 hours as needed if you feel tenderness from surgical  incision (Do not to exceed 4000mg daily) or Motrin 200mg 1-2 tabs every 4-6 hours.  Warning: Do not take additional Tylenol while taking Percocet or Vicodin. All of these  products contain Acetaminophen, which can be toxic if taken in excess.    Stimulation   Stimulation is the pulling or tingling sensation felt in the pelvic area (near the vagina, scrotum  or anal area.) It should not be painful. If it is painful or you feel the stimulation sensation move  down the legs decrease the stimulation and call the office and speak to a nurse.   During the trial period (stage 1) you may notice that the Interstim device has improved your  continence which means it is working and on the correct setting. If you are having episodes of  incontinence you will need to increase your device setting by moving the dial up slowly.    Activity   Avoid heavy lifting or strenuous activity for 14 days after your surgery.   Frontal Showers or Sponge bath only for two weeks after each surgery. Avoid immersion in any  water until after your post-op appointment.    Symptoms to Report   Severe or worsening pain, unrelieved by pain medications.   Fever, greater than 101.5ºF, chills or sweats   Painful or problems urinating   Any problems with the device    If you  experience any of the above symptoms, call the [facility] clinic at [phone] during business hours on weekdays. If after hours or on weekends, call [phone] and ask to speak with the urology resident on call.    You may also call the [employer] Patient Help Line for specific help troubleshooting your device.  For help with the temporary implant Call [phone].  For help with the permanent implant Call [phone].

## 2022-07-12 NOTE — PROGRESS NOTES
MTPV PEER2 study  IRB# [id number]  PI: Dr. Edwards  Subject number: [id number]     Date: 12-Jul-2022     Device Implant: OAB cohort     I. Follow up signal acquisition was done in the clinic before the implant procedure by [employer] and research personnel, programming data was obtained and uploaded.      II. Patient's eligibility was reviewed and confirmed by PI prior to device implant               -Patient diary  Day 1: 6 voids                                      Day 2 : 7 voids                                      Day 3: 7voids                                      Investigator considers this as a successful therapy evaluation based on patient report and diary      III. Confirmed- no new adverse events to report     IV. Change in medication- none      V. Diary Instructions and distribution  Subject was provided with IRB approved OAB cohort diary booklet and was re- trained on the following:   · Diary instructions  · Diary definitions  · GCP practices were reviewed with the subject     's contact information was provided.Patient received her full implant today, we will see her in 3 months for her final study visit with her diary and questionnaires.

## 2022-07-12 NOTE — TRANSFER OF CARE
Anesthesia Transfer of Care Note    Patient: Earl Abdul    Procedure(s) Performed: Procedure(s) (LRB):  INSERTION, NEUROSTIMULATOR, PERMANENT, SACRAL (N/A)    Patient location: [facility]    Anesthesia Type: general    Transport from OR: Transported from OR on 6-10 L/min O2 by face mask with adequate spontaneous ventilation    Post pain: adequate analgesia    Post assessment: no apparent anesthetic complications    Post vital signs: stable    Level of consciousness: responds to stimulation, sedated and awake    Nausea/Vomiting: no nausea/vomiting    Complications: none    Transfer of care protocol was followed      Last vitals:   Visit Vitals  BP (!) 140/68 (BP Location: Left arm, Patient Position: Lying)   Pulse 101   Temp 37.2 °C (98.9 °F) (Oral)   Resp 18   Wt 86.2 kg (190 lb)   SpO2 (!) 94%   Breastfeeding No   BMI 30.67 kg/m²

## 2022-07-12 NOTE — TELEPHONE ENCOUNTER
Pt was in clinic w research staff. VS taken due to shaking, nausea... Bp  126/74 P 101 T98.5 oral.

## 2022-07-12 NOTE — ANESTHESIA PROCEDURE NOTES
Intubation    Date/Time: 7/12/2022 3:14 PM  Performed by: Sarah Guerra CRNA  Authorized by: Domenic Aviles MD     Intubation:     Induction:  Intravenous    Intubated:  Postinduction    Mask Ventilation:  Easy mask    Attempts:  1    Attempted By:  CRNA    Method of Intubation:  Video laryngoscopy    Blade:  Wong 3    Laryngeal View Grade: Grade I - full view of cords      Difficult Airway Encountered?: No      Complications:  None    Airway Device:  Oral endotracheal tube    Airway Device Size:  7.0    Style/Cuff Inflation:  Cuffed (inflated to minimal occlusive pressure)    Tube secured:  21    Secured at:  The lips    Placement Verified By:  Capnometry and Fiber optic visualization    Complicating Factors:  None    Findings Post-Intubation:  BS equal bilateral and atraumatic/condition of teeth unchanged

## 2022-07-13 LAB — POCT GLUCOSE: 103 MG/DL (ref 70–110)

## 2022-07-13 NOTE — ANESTHESIA POSTPROCEDURE EVALUATION
Anesthesia Post Evaluation    Patient: Earl Abdul    Procedure(s) Performed: Procedure(s) (LRB):  INSERTION, NEUROSTIMULATOR, PERMANENT, SACRAL (N/A)    Final Anesthesia Type: general      Patient location during evaluation: [facility]  Patient participation: Yes- Able to Participate  Level of consciousness: awake and alert  Post-procedure vital signs: reviewed and stable  Pain management: adequate  Airway patency: patent    PONV status at discharge: No PONV  Anesthetic complications: no      Cardiovascular status: hemodynamically stable  Respiratory status: unassisted  Hydration status: euvolemic  Follow-up not needed.          Vitals Value Taken Time   /70 07/12/22 1632   Temp 36.5 °C (97.7 °F) 07/12/22 1554   Pulse 79 07/12/22 1639   Resp 19 07/12/22 1639   SpO2 94 % 07/12/22 1637   Vitals shown include unvalidated device data.      No case tracking events are documented in the log.      Pain/Mary Score: Pain Rating Prior to Med Admin: 6 (7/12/2022 12:52 PM)  Mary Score: 9 (7/12/2022  4:15 PM)

## 2022-07-14 ENCOUNTER — PATIENT MESSAGE (OUTPATIENT)
Dept: NEUROLOGY | Facility: CLINIC | Age: 62
End: 2022-07-14
Payer: COMMERCIAL

## 2022-07-22 ENCOUNTER — PATIENT MESSAGE (OUTPATIENT)
Dept: NEUROLOGY | Facility: CLINIC | Age: 62
End: 2022-07-22
Payer: COMMERCIAL

## 2022-07-26 ENCOUNTER — PATIENT MESSAGE (OUTPATIENT)
Dept: NEUROLOGY | Facility: CLINIC | Age: 62
End: 2022-07-26
Payer: COMMERCIAL

## 2022-07-28 DIAGNOSIS — G43.001 MIGRAINE WITHOUT AURA AND WITH STATUS MIGRAINOSUS, NOT INTRACTABLE: ICD-10-CM

## 2022-07-28 DIAGNOSIS — G43.001 MIGRAINE WITHOUT AURA AND WITH STATUS MIGRAINOSUS, NOT INTRACTABLE: Primary | ICD-10-CM

## 2022-07-28 RX ORDER — TOPIRAMATE 25 MG/1
TABLET ORAL
Qty: 67 TABLET | Refills: 0 | Status: SHIPPED | OUTPATIENT
Start: 2022-07-28 | End: 2022-08-09 | Stop reason: CLARIF

## 2022-07-28 RX ORDER — TOPIRAMATE 25 MG/1
TABLET ORAL
Qty: 67 TABLET | Refills: 0 | Status: SHIPPED | OUTPATIENT
Start: 2022-07-28 | End: 2022-07-28

## 2022-07-29 ENCOUNTER — PATIENT MESSAGE (OUTPATIENT)
Dept: NEUROLOGY | Facility: CLINIC | Age: 62
End: 2022-07-29
Payer: COMMERCIAL

## 2022-08-04 DIAGNOSIS — G43.001 MIGRAINE WITHOUT AURA AND WITH STATUS MIGRAINOSUS, NOT INTRACTABLE: Primary | ICD-10-CM

## 2022-08-04 RX ORDER — PREDNISONE 5 MG/1
TABLET ORAL
Qty: 21 TABLET | Refills: 0 | Status: SHIPPED | OUTPATIENT
Start: 2022-08-04 | End: 2022-08-10

## 2022-08-09 ENCOUNTER — PATIENT MESSAGE (OUTPATIENT)
Dept: NEUROLOGY | Facility: CLINIC | Age: 62
End: 2022-08-09
Payer: COMMERCIAL

## 2022-08-09 DIAGNOSIS — G43.001 MIGRAINE WITHOUT AURA AND WITH STATUS MIGRAINOSUS, NOT INTRACTABLE: ICD-10-CM

## 2022-08-09 RX ORDER — UBROGEPANT 50 MG/1
50 TABLET ORAL ONCE AS NEEDED
Qty: 8 TABLET | Refills: 2 | Status: SHIPPED | OUTPATIENT
Start: 2022-08-09 | End: 2022-08-10

## 2022-08-09 NOTE — PROGRESS NOTES
Patient has tried and failed the following:      Abortive-     Rizatriptan    Nurtec     Preventive-    Cymbalta    Topamax    Gabapentin    Patient is unable to tolerate added SSRI or Beta Blocker due to other medication use. It is medically necessary for the patient to use ubrelvy as abortive medication.         Sent in a new prescription for patient to try ubrelvy since she failed nurtec      Maura Desai PA-C

## 2022-08-10 DIAGNOSIS — G43.001 MIGRAINE WITHOUT AURA AND WITH STATUS MIGRAINOSUS, NOT INTRACTABLE: ICD-10-CM

## 2022-08-10 RX ORDER — UBROGEPANT 50 MG/1
50 TABLET ORAL ONCE AS NEEDED
Qty: 8 TABLET | Refills: 2 | Status: SHIPPED | OUTPATIENT
Start: 2022-08-10 | End: 2022-08-10

## 2022-08-11 ENCOUNTER — TELEPHONE (OUTPATIENT)
Dept: NEUROLOGY | Facility: CLINIC | Age: 62
End: 2022-08-11
Payer: COMMERCIAL

## 2022-08-11 ENCOUNTER — PATIENT MESSAGE (OUTPATIENT)
Dept: NEUROLOGY | Facility: CLINIC | Age: 62
End: 2022-08-11
Payer: COMMERCIAL

## 2022-08-11 ENCOUNTER — PATIENT MESSAGE (OUTPATIENT)
Dept: INTERNAL MEDICINE | Facility: CLINIC | Age: 62
End: 2022-08-11
Payer: COMMERCIAL

## 2022-08-11 RX ORDER — UBROGEPANT 50 MG/1
50 TABLET ORAL ONCE AS NEEDED
Qty: 8 TABLET | Refills: 2 | Status: SHIPPED | OUTPATIENT
Start: 2022-08-11 | End: 2022-08-11

## 2022-08-11 NOTE — PROGRESS NOTES
Patient tried and failed nurtec prescription. Ubrelvy ordered for pain relief.       Maura Desai PA-C

## 2022-08-11 NOTE — TELEPHONE ENCOUNTER
Per provider, wanted patient to see headache providers.     Attempted to call the patient, LVM to return the call.

## 2022-08-11 NOTE — TELEPHONE ENCOUNTER
----- Message from Shravan Adrian sent at 8/11/2022  3:11 PM CDT -----  Contact: [facility] @ [phone]  Caller calling to get the alternative to ( ubrogepant (UBRELVY) 50 mg tablet) due to insurance coverage pls call in ( sumatriptan 25mg tablet ) to: [facility] @ [phone]    [facility] [address] [facility]  [address] [address]  Phone: [phone] Fax: [phone]    PATIENT IS OUT OF MEDICATION

## 2022-08-11 NOTE — TELEPHONE ENCOUNTER
Spoke with the patient and will be scheduled for a virtual appt on 8/29 with headache provider - awaiting 5 day change

## 2022-08-15 DIAGNOSIS — G43.001 MIGRAINE WITHOUT AURA AND WITH STATUS MIGRAINOSUS, NOT INTRACTABLE: ICD-10-CM

## 2022-08-15 RX ORDER — SUMATRIPTAN 50 MG/1
50 TABLET, FILM COATED ORAL
Qty: 8 TABLET | Refills: 0 | Status: SHIPPED | OUTPATIENT
Start: 2022-08-15 | End: 2022-08-22

## 2022-08-15 RX ORDER — DIVALPROEX SODIUM 125 MG/1
250 CAPSULE, COATED PELLETS ORAL 2 TIMES DAILY
Qty: 120 CAPSULE | Refills: 11 | Status: SHIPPED | OUTPATIENT
Start: 2022-08-15 | End: 2022-08-22

## 2022-08-18 ENCOUNTER — DOCUMENTATION ONLY (OUTPATIENT)
Dept: REHABILITATION | Facility: OTHER | Age: 62
End: 2022-08-18
Payer: COMMERCIAL

## 2022-08-18 DIAGNOSIS — R15.9 INCONTINENCE OF FECES, UNSPECIFIED FECAL INCONTINENCE TYPE: Primary | ICD-10-CM

## 2022-08-18 DIAGNOSIS — N39.46 MIXED INCONTINENCE: ICD-10-CM

## 2022-08-18 DIAGNOSIS — M62.89 PELVIC FLOOR TENSION: ICD-10-CM

## 2022-08-18 NOTE — PROGRESS NOTES
PHYSICAL THERAPY DISCHARGE SUMMARY     Name: Earl Abdul  [facility] Number: [id number]    Diagnosis: N81.84 (ICD-10-CM) - Pelvic muscle wasting, N39.41 (ICD-10-CM) - Urge incontinence of urine     Physician: Kalin Desir MD     Treatment Orders: PT Eval and Treat  Past Medical History:   Diagnosis Date    Anemia     Anemia     Controlled type 2 diabetes mellitus without complication, without long-term current use of insulin 11/30/2021    COPD (chronic obstructive pulmonary disease)     Depression     Diverticulitis     Fatty liver     GERD (gastroesophageal reflux disease)     Hyperlipidemia     Hypertension     Pancreatitis     Peptic ulcer disease     Polysubstance abuse     Posterior reversible encephalopathy syndrome     Sarcoidosis of lung     Sarcoidosis of lung     over 30 yrs ago    Seizures     7/2017    Suicide attempt     Suicide ideation        Initial visit: 12/21/2021     Date of Last visit: 12/21/2021     Date of Discharge Note:  8/18/2022    Total Visits Received: 1    ASSESSMENT   Status Towards Goals Met:  Goals not met    Goals Not achieved and why:  Pt has not scheduled any additional visits and has not returned to therapy.     Discharge reason : Patient self discharge      PLAN   This patient is discharged from Physical Therapy Services.       Malik Mayberry, PT

## 2022-08-22 ENCOUNTER — OFFICE VISIT (OUTPATIENT)
Dept: HEMATOLOGY/ONCOLOGY | Facility: CLINIC | Age: 62
End: 2022-08-22
Payer: COMMERCIAL

## 2022-08-22 VITALS
BODY MASS INDEX: 30.65 KG/M2 | DIASTOLIC BLOOD PRESSURE: 62 MMHG | RESPIRATION RATE: 17 BRPM | OXYGEN SATURATION: 96 % | TEMPERATURE: 99 F | HEIGHT: 66 IN | WEIGHT: 190.69 LBS | SYSTOLIC BLOOD PRESSURE: 135 MMHG | HEART RATE: 87 BPM

## 2022-08-22 DIAGNOSIS — D72.820 MONOCLONAL B-CELL LYMPHOCYTOSIS: Primary | ICD-10-CM

## 2022-08-22 DIAGNOSIS — D69.6 THROMBOCYTOPENIA: ICD-10-CM

## 2022-08-22 DIAGNOSIS — C91.10 CLL (CHRONIC LYMPHOCYTIC LEUKEMIA): ICD-10-CM

## 2022-08-22 DIAGNOSIS — D64.9 ANEMIA, UNSPECIFIED TYPE: ICD-10-CM

## 2022-08-22 PROCEDURE — 3066F NEPHROPATHY DOC TX: CPT | Mod: CPTII,S$GLB,, | Performed by: INTERNAL MEDICINE

## 2022-08-22 PROCEDURE — 3078F DIAST BP <80 MM HG: CPT | Mod: CPTII,S$GLB,, | Performed by: INTERNAL MEDICINE

## 2022-08-22 PROCEDURE — 4010F PR ACE/ARB THEARPY RXD/TAKEN: ICD-10-PCS | Mod: CPTII,S$GLB,, | Performed by: INTERNAL MEDICINE

## 2022-08-22 PROCEDURE — 3008F PR BODY MASS INDEX (BMI) DOCUMENTED: ICD-10-PCS | Mod: CPTII,S$GLB,, | Performed by: INTERNAL MEDICINE

## 2022-08-22 PROCEDURE — 99999 PR PBB SHADOW E&M-EST. PATIENT-LVL III: CPT | Mod: PBBFAC,,, | Performed by: INTERNAL MEDICINE

## 2022-08-22 PROCEDURE — 3066F PR DOCUMENTATION OF TREATMENT FOR NEPHROPATHY: ICD-10-PCS | Mod: CPTII,S$GLB,, | Performed by: INTERNAL MEDICINE

## 2022-08-22 PROCEDURE — 3078F PR MOST RECENT DIASTOLIC BLOOD PRESSURE < 80 MM HG: ICD-10-PCS | Mod: CPTII,S$GLB,, | Performed by: INTERNAL MEDICINE

## 2022-08-22 PROCEDURE — 99999 PR PBB SHADOW E&M-EST. PATIENT-LVL III: ICD-10-PCS | Mod: PBBFAC,,, | Performed by: INTERNAL MEDICINE

## 2022-08-22 PROCEDURE — 3044F PR MOST RECENT HEMOGLOBIN A1C LEVEL <7.0%: ICD-10-PCS | Mod: CPTII,S$GLB,, | Performed by: INTERNAL MEDICINE

## 2022-08-22 PROCEDURE — 4010F ACE/ARB THERAPY RXD/TAKEN: CPT | Mod: CPTII,S$GLB,, | Performed by: INTERNAL MEDICINE

## 2022-08-22 PROCEDURE — 99215 OFFICE O/P EST HI 40 MIN: CPT | Mod: S$GLB,,, | Performed by: INTERNAL MEDICINE

## 2022-08-22 PROCEDURE — 3044F HG A1C LEVEL LT 7.0%: CPT | Mod: CPTII,S$GLB,, | Performed by: INTERNAL MEDICINE

## 2022-08-22 PROCEDURE — 3061F NEG MICROALBUMINURIA REV: CPT | Mod: CPTII,S$GLB,, | Performed by: INTERNAL MEDICINE

## 2022-08-22 PROCEDURE — 3008F BODY MASS INDEX DOCD: CPT | Mod: CPTII,S$GLB,, | Performed by: INTERNAL MEDICINE

## 2022-08-22 PROCEDURE — 99215 PR OFFICE/OUTPT VISIT, EST, LEVL V, 40-54 MIN: ICD-10-PCS | Mod: S$GLB,,, | Performed by: INTERNAL MEDICINE

## 2022-08-22 PROCEDURE — 3075F SYST BP GE 130 - 139MM HG: CPT | Mod: CPTII,S$GLB,, | Performed by: INTERNAL MEDICINE

## 2022-08-22 PROCEDURE — 3075F PR MOST RECENT SYSTOLIC BLOOD PRESS GE 130-139MM HG: ICD-10-PCS | Mod: CPTII,S$GLB,, | Performed by: INTERNAL MEDICINE

## 2022-08-22 PROCEDURE — 3061F PR NEG MICROALBUMINURIA RESULT DOCUMENTED/REVIEW: ICD-10-PCS | Mod: CPTII,S$GLB,, | Performed by: INTERNAL MEDICINE

## 2022-08-22 RX ORDER — RIZATRIPTAN BENZOATE 5 MG/1
5 TABLET, ORALLY DISINTEGRATING ORAL 2 TIMES DAILY PRN
COMMUNITY
Start: 2022-08-09 | End: 2022-09-06

## 2022-08-22 RX ORDER — HYDROCODONE BITARTRATE AND ACETAMINOPHEN 5; 325 MG/1; MG/1
1 TABLET ORAL
COMMUNITY
Start: 2022-08-19 | End: 2022-09-06

## 2022-08-22 RX ORDER — HYDROXYZINE PAMOATE 25 MG/1
25 CAPSULE ORAL 3 TIMES DAILY
Status: ON HOLD | COMMUNITY
Start: 2022-08-10 | End: 2022-09-27

## 2022-08-22 RX ORDER — ARIPIPRAZOLE 5 MG/1
5 TABLET ORAL DAILY
Status: ON HOLD | COMMUNITY
Start: 2022-08-10 | End: 2023-04-30 | Stop reason: SDUPTHER

## 2022-08-22 RX ORDER — ONDANSETRON 4 MG/1
8 TABLET, FILM COATED ORAL 2 TIMES DAILY
COMMUNITY
Start: 2022-07-18 | End: 2022-09-06 | Stop reason: SDUPTHER

## 2022-08-22 NOTE — PROGRESS NOTES
SECTION OF HEMATOLOGY AND BONE MARROW TRANSPLANT  Return Patient Visit   08/27/2022    CHIEF COMPLAINT: No chief complaint on file.      HISTORY OF PRESENT ILLNESS:   Self referred to me for CLL/MBCL; was seen previously by dr. Gonzalez with recommendations for observation.    Of note cll fish done on pb and favorable 13 q del noted.  Not tp53 or IgHV sequencing sent.    Remains asymptomatic today.   Abstinent from etoh; significant alcoholism history.   Of note underwent bilateral mastectomy for  T1b N0 stage IA breast cancer October 2020 with no adjuvant therapy and was started on  Letrozole February 2021-may 2021.  Of note was referred to Dr. Gonzalez June 2022 for leukocytosis with flow cytometry of PB cw with cll vs other indolent b cell lymphoma phenotype.  Of note intermittent mild  anemia/thrombocytopenia in setting of normal nutritional parameters    Heavy etoh history; currently sober following rehab.   PA referred to [facility].       PAST MEDICAL HISTORY:   Past Medical History:   Diagnosis Date    Anemia     Anemia     Controlled type 2 diabetes mellitus without complication, without long-term current use of insulin 11/30/2021    COPD (chronic obstructive pulmonary disease)     Depression     Diverticulitis     Fatty liver     GERD (gastroesophageal reflux disease)     Hyperlipidemia     Hypertension     Pancreatitis     Peptic ulcer disease     Polysubstance abuse     Posterior reversible encephalopathy syndrome     Sarcoidosis of lung     Sarcoidosis of lung     over 30 yrs ago    Seizures     7/2017    Suicide attempt     Suicide ideation        PAST SURGICAL HISTORY:   Past Surgical History:   Procedure Laterality Date    APPENDECTOMY      BILATERAL MASTECTOMY Bilateral 10/29/2020    Procedure: MASTECTOMY, BILATERAL;  Surgeon: Baylee Kevin MD;  Location: [facility] OR [address] [place] [address];  Service: General;  Laterality: Bilateral;    BREAST REVISION SURGERY Bilateral 2/11/2021    Procedure: BREAST REVISION SURGERY;   Surgeon: Scottie Johnson MD;  Location: [address];  Service: Plastics;  Laterality: Bilateral;    COLONOSCOPY N/A 7/28/2017    Procedure: COLONOSCOPY;  Surgeon: Aaron Alvarado MD;  Location: [facility];  Service: Endoscopy;  Laterality: N/A;    ESOPHAGOGASTRODUODENOSCOPY  10/7/2016, 11/6/2014    2016 - gastritis, duodenitis, 2014 erosive gastritis    ESOPHAGOGASTRODUODENOSCOPY N/A 2/11/2020    Procedure: ESOPHAGOGASTRODUODENOSCOPY (EGD);  Surgeon: Fawn Garrido MD;  Location: [facility];  Service: Endoscopy;  Laterality: N/A;    ESOPHAGOGASTRODUODENOSCOPY N/A 4/19/2021    Procedure: EGD (ESOPHAGOGASTRODUODENOSCOPY);  Surgeon: Paramjit Martino MD;  Location: [facility];  Service: Endoscopy;  Laterality: N/A;    FLEXIBLE SIGMOIDOSCOPY  11/06/2014    colitis    HYSTERECTOMY      IMPLANTATION OF PERMANENT SACRAL NERVE STIMULATOR N/A 7/12/2022    Procedure: INSERTION, NEUROSTIMULATOR, PERMANENT, SACRAL;  Surgeon: Juaquin Edwards MD;  Location: [address];  Service: Urology;  Laterality: N/A;  1hr    INJECTION FOR SENTINEL NODE IDENTIFICATION Right 10/29/2020    Procedure: INJECTION, FOR SENTINEL NODE IDENTIFICATION;  Surgeon: Baylee Kevin MD;  Location: [address];  Service: General;  Laterality: Right;    INJECTION OF JOINT Right 10/10/2019    Procedure: Injection, Joint RIGHT ILIOPSOAS BURSA/TENDON INJECTION AND RIGHT GLUTEAL TENDON INJECTION WITH STEROID AND LIDOCAINE;  Surgeon: Guillaume Rico MD;  Location: [facility] PAIN MGT;  Service: Pain Management;  Laterality: Right;  NEEDS CONSENT    INSERTION OF BREAST TISSUE EXPANDER Bilateral 10/29/2020    Procedure: INSERTION, TISSUE EXPANDER, BREAST;  Surgeon: Scottie Johnson MD;  Location: [address];  Service: Plastics;  Laterality: Bilateral;  Right breast: 1082 g  Left breast: 1076 g    LIPOSUCTION Bilateral 2/11/2021    Procedure: LIPOSUCTION;  Surgeon: Scottie Johnson MD;  Location: [address];  Service:  Plastics;  Laterality: Bilateral;    mediastenoscopy      REPLACEMENT OF IMPLANT OF BREAST Bilateral 2/11/2021    Procedure: REPLACEMENT, IMPLANT, BREAST;  Surgeon: Scottie Johnson MD;  Location: [facility] OR [address] [place] [address];  Service: Plastics;  Laterality: Bilateral;    SENTINEL LYMPH NODE BIOPSY Right 10/29/2020    Procedure: BIOPSY, LYMPH NODE, SENTINEL;  Surgeon: Baylee Kevin MD;  Location: [facility] OR [address] [place] [address];  Service: General;  Laterality: Right;    TONSILLECTOMY N/A 1970    TUBAL LIGATION         PAST SOCIAL HISTORY:   reports that she has been smoking vaping with nicotine and cigarettes. She has a 15.00 pack-year smoking history. She has never used smokeless tobacco. She reports current alcohol use. She reports current drug use. Drug: Marijuana.    FAMILY HISTORY:  Family History   Problem Relation Age of Onset    Heart attack Father     Diabetes Father     Hypertension Father     Diabetes Mother     Hypertension Mother     Breast cancer Maternal Aunt     Colon cancer Maternal Uncle     Breast cancer Daughter     Ovarian cancer Neg Hx     Cancer Neg Hx        CURRENT MEDICATIONS:   Current Outpatient Medications   Medication Sig    anastrozole (ARIMIDEX) 1 mg Tab Take 1 tablet (1 mg total) by mouth every morning.    ARIPiprazole (ABILIFY) 5 MG Tab Take 5 mg by mouth once daily.    atorvastatin (LIPITOR) 10 MG tablet Take 10 mg by mouth every morning.    ATROVENT HFA 17 mcg/actuation inhaler Inhale 2 puffs into the lungs every 6 (six) hours as needed for Wheezing.    DULoxetine (CYMBALTA) 60 MG capsule Take 60 mg by mouth every evening.    gabapentin (NEURONTIN) 600 MG tablet Take 1 tablet (600 mg total) by mouth 2 (two) times daily.    HYDROcodone-acetaminophen (NORCO) 5-325 mg per tablet Take 1 tablet by mouth.    hydrOXYzine pamoate (VISTARIL) 25 MG Cap Take 25 mg by mouth 3 (three) times daily.    levothyroxine (SYNTHROID) 50 MCG tablet Take 1 tablet (50 mcg total) by mouth before breakfast.    lisinopriL  (PRINIVIL,ZESTRIL) 20 MG tablet Take 1 tablet (20 mg total) by mouth once daily. (Patient taking differently: Take 20 mg by mouth every morning.)    metFORMIN (GLUCOPHAGE-XR) 500 MG ER 24hr tablet Take 2 tablets (1,000 mg total) by mouth 2 (two) times daily with meals.    omega-3 fatty acids 1,000 mg Cap Take 1 capsule by mouth once daily.    ondansetron (ZOFRAN) 4 MG tablet Take 8 mg by mouth 2 (two) times daily.    ondansetron (ZOFRAN-ODT) 4 MG TbDL Take 2 tablets (8 mg total) by mouth every 12 (twelve) hours as needed (Nausea).    ondansetron (ZOFRAN-ODT) 4 MG TbDL Dissolve 2 tablets (8 mg total) by mouth 2 (two) times daily.    pantoprazole (PROTONIX) 40 MG tablet Take 1 tablet (40 mg total) by mouth once daily.    rizatriptan (MAXALT-MLT) 5 MG disintegrating tablet Take 5 mg by mouth 2 (two) times daily as needed.    semaglutide (OZEMPIC) 0.25 mg or 0.5 mg(2 mg/1.5 mL) pen injector Inject 0.5 mg into the skin every 7 days. Start with 0.25 mg weekly for 4 weeks and then increase to 0.5 mg if you are tolerating this dose (Patient taking differently: Inject 0.5 mg into the skin every 7 days. Start with 0.25 mg weekly for 4 weeks and then increase to 0.5 mg if you are tolerating this dose  SATURDAYS)    tiotropium-olodateroL (STIOLTO RESPIMAT) 2.5-2.5 mcg/actuation Mist Inhale 1 puff into the lungs 2 (two) times a day. Controller    traMADoL (ULTRAM) 50 mg tablet Take 1 tablet (50 mg total) by mouth every 6 (six) hours.    traMADoL (ULTRAM) 50 mg tablet Take 1 tablet (50 mg total) by mouth every 6 (six) hours.    traZODone (DESYREL) 300 MG tablet Take 1 tablet (300 mg total) by mouth nightly.     No current facility-administered medications for this visit.     Facility-Administered Medications Ordered in Other Visits   Medication    albuterol sulfate nebulizer solution 2.5 mg     ALLERGIES:   Review of patient's allergies indicates:   Allergen Reactions    Lortab [hydrocodone-acetaminophen] Itching    Promethazine  Itching and Other (See Comments)           REVIEW OF SYSTEMS:    See hpi    PHYSICAL EXAM:   Vitals:    08/22/22 1114   BP: 135/62   Pulse: 87   Resp: 17   Temp: 98.5 °F (36.9 °C)       General - well developed, well nourished, no apparent distress  HEENT - oropharynx clear  Chest and Lung - clear to auscultation bilaterally   Cardiovascular - RRR with no MGR, normal S1 and S2  Abdomen-  soft, nontender, no palpable hepatomegaly or splenomegaly  Lymph - no palpable lymphadenopathy  Heme - no bruising, petechiae, pallor  Skin - no rashes or lesions  Psych - appropriate mood and affect      ECOG Performance Status: (foot note - ECOG PS provided by Eastern Cooperative Oncology Group) 1 - Symptomatic but completely ambulatory    Karnofsky Performance Score:  90%- Able to Carry on Normal Activity: Minor Symptoms of Disease  DATA:   Lab Results   Component Value Date    WBC 7.35 06/22/2022    HGB 10.8 (L) 06/22/2022    HCT 35.8 (L) 06/22/2022    MCV 99 (H) 06/22/2022     (L) 06/22/2022       Gran # (ANC)   Date Value Ref Range Status   06/22/2022 2.2 1.8 - 7.7 K/uL Final     Gran %   Date Value Ref Range Status   06/22/2022 30.0 (L) 38.0 - 73.0 % Final     Lymph #   Date Value Ref Range Status   06/22/2022 4.5 1.0 - 4.8 K/uL Final     Lymph %   Date Value Ref Range Status   06/22/2022 61.0 (H) 18.0 - 48.0 % Final     CMP  Sodium   Date Value Ref Range Status   06/22/2022 140 136 - 145 mmol/L Final     Potassium   Date Value Ref Range Status   06/22/2022 3.5 3.5 - 5.1 mmol/L Final     Chloride   Date Value Ref Range Status   06/22/2022 102 95 - 110 mmol/L Final     CO2   Date Value Ref Range Status   06/22/2022 25 23 - 29 mmol/L Final     Glucose   Date Value Ref Range Status   06/22/2022 97 70 - 110 mg/dL Final     BUN   Date Value Ref Range Status   06/22/2022 5 (L) 8 - 23 mg/dL Final     Creatinine   Date Value Ref Range Status   06/22/2022 0.9 0.5 - 1.4 mg/dL Final     Calcium   Date Value Ref Range Status    06/22/2022 9.3 8.7 - 10.5 mg/dL Final     Total Protein   Date Value Ref Range Status   06/22/2022 7.0 6.0 - 8.4 g/dL Final     Albumin   Date Value Ref Range Status   06/22/2022 4.1 3.5 - 5.2 g/dL Final     Total Bilirubin   Date Value Ref Range Status   06/22/2022 0.4 0.1 - 1.0 mg/dL Final     Comment:     For infants and newborns, interpretation of results should be based  on gestational age, weight and in agreement with clinical  observations.    Premature Infant recommended reference ranges:  Up to 24 hours.............<8.0 mg/dL  Up to 48 hours............<12.0 mg/dL  3-5 days..................<15.0 mg/dL  6-29 days.................<15.0 mg/dL       Alkaline Phosphatase   Date Value Ref Range Status   06/22/2022 55 55 - 135 U/L Final     AST   Date Value Ref Range Status   06/22/2022 105 (H) 10 - 40 U/L Final     ALT   Date Value Ref Range Status   06/22/2022 72 (H) 10 - 44 U/L Final     Anion Gap   Date Value Ref Range Status   06/22/2022 13 8 - 16 mmol/L Final     eGFR if    Date Value Ref Range Status   06/22/2022 >60.0 >60 mL/min/1.73 m^2 Final     eGFR if non    Date Value Ref Range Status   06/22/2022 >60.0 >60 mL/min/1.73 m^2 Final     Comment:     Calculation used to obtain the estimated glomerular filtration  rate (eGFR) is the CKD-EPI equation.        CT ap with contrast - may 2022  COMPARISON:  04/17/2021     FINDINGS:  Left basilar discoid atelectasis/scarring noted.     Liver demonstrates diffuse hypoattenuation in keeping with steatosis and appears enlarged, measuring 19 cm.  No focal suspicious lesions.  No calcified gallstones.  No intrahepatic or extrahepatic biliary dilatation.  Pancreas is unremarkable.  Spleen is enlarged, measuring 16.4 x 5.6 cm.  2.0 cm hypoattenuating lesion at the inferior aspect is stable.  Adrenal glands are unremarkable.  No hydronephrosis. There is asymmetric right perinephric stranding with retroperitoneal/periureteral stranding  extending to the pelvis.  Mild right ureteral wall thickening noted.  There is multifocal abnormal enhancement within the right renal parenchyma in keeping with pyelonephritis.  No evidence for urolithiasis.     GI tract demonstrates no evidence for obstruction or inflammation.  Multiple colonic diverticula are noted.     Prominent lymph nodes are noted at the nolvia hepatis, largest measuring 1.5 cm short axis.     No ascites.     Abdominal aorta is normal caliber, noting calcified plaque.     Status post hysterectomy.  No adnexal masses.  Urinary bladder is unremarkable.     Regional osseous structures demonstrate no aggressive appearing lytic or blastic lesions.  There are postoperative changes of in the lower lumbar spine.  Note made of partially visualized breast implants.     Impression:     1. Right perinephric/periureteral stranding with mild ureteral wall thickening.  Abnormal enhancement of the right kidney in keeping with pyelonephritis.  2. Hepatomegaly with steatosis.  3. Splenomegaly.  4. Stable periportal lymphadenopathy, likely reactive.      6/22/22 - PB flow cytometry  Immunophenotyping of peripheral blood detects a distinct kappa light chain   restricted monoclonal B-cell population  (calculated at 2.44x10   9 /L, from the most recent CBC showing a total WBC of  7.35 K/uL with 61%   total lymphocytes)  with a CLL phenotype (coexpression of CD19, CD5, CD23 and   dim CD20). CD22 (FITC), FMC-7 and CD38 are negative in this population.   Without associated lymphadenopathy, organomegaly, other extramedullary   involvement, or any other features of a B-cell lymphoproliferative disorder,   a monoclonal B-cell count   <5x10 9 /L in the peripheral blood should be classified as monoclonal B-cell   lymphocytosis (MBL) (WHO Classification of Tumors of Hematopoietic and   Lymphoid Tissues, 2017). Correlation with clinical presentation and other   laboratory results is required.     CLL fish 6/22/22  Comment:  The result is abnormal and indicates a 13q deletion   involving both chromosomes 13 in 26% of nuclei.     In CLL, a 13q deletion is associated with a favorable   prognosis (Green et al., Blood 127:6958-1157, 2016; Van   George et al., Brit J Haematol 173:105-113, 2016).     ASSESSMENT AND PLAN:   Encounter Diagnoses   Name Primary?    Monoclonal B-cell lymphocytosis Yes    CLL (chronic lymphocytic leukemia)     Anemia, unspecified type     Thrombocytopenia      -MBCL/CLL; did meet CLL criteria on one cbc June 2022 but has since returned to MBCL criteria; spectrum explained to/ today  -confirmed on June 2022 PB flow cytometry  -asymptomatic with normal physical exam   -no indication for imaging or marrow biopsy currently   -favorable risk 13 q del by fish; will send tp53 sequencing and ighv mutation with next lab draw for better risk stratification  -she has chronic mild anemia/thrombocytopenia that is likely due to her etoh related liver disease for which she follows with hepatology; currently abstinent from etoh; normal nutritional evaluation jan 2022; do not feel cytopenias are related to her Lymphoproliferative disorder   -will follow pt q 6 months with labs and provider appts  -educated on symptoms for which to seek attn in our clinic earlier   -fu with med onc for breast cancer recurrence surveillance    Fu: cbc, cmp, ldh, tp53, ighv lab and MD appt in 6 months    BMT Chart Routing      Follow up with physician 6 months. cbc, cmp, ldh, tp53, ighv lab and MD appt in 6 months   Follow up with [facility]    Infusion scheduling note    Injection scheduling note    Labs    Imaging    Pharmacy appointment    Other referrals        Tristin Montano MD  Hematology/Oncology/Bone Marrow Transplant

## 2022-08-25 ENCOUNTER — PATIENT MESSAGE (OUTPATIENT)
Dept: NEUROLOGY | Facility: CLINIC | Age: 62
End: 2022-08-25
Payer: COMMERCIAL

## 2022-08-26 ENCOUNTER — TELEPHONE (OUTPATIENT)
Dept: UROLOGY | Facility: CLINIC | Age: 62
End: 2022-08-26
Payer: COMMERCIAL

## 2022-08-26 NOTE — TELEPHONE ENCOUNTER
----- Message from Safia Lowe sent at 8/26/2022 10:09 AM CDT -----  Name of Who is Calling: MARTY SALDAÑA [[id number]]            What is the request in detail: Patient is requesting call back she went to the wrong location and was trying to see if it was to late to still come?              Can the clinic reply by [facility]: no              What Number to Call Back if not in [facility]: [phone]

## 2022-09-06 ENCOUNTER — OFFICE VISIT (OUTPATIENT)
Dept: INTERNAL MEDICINE | Facility: CLINIC | Age: 62
End: 2022-09-06
Attending: INTERNAL MEDICINE
Payer: COMMERCIAL

## 2022-09-06 VITALS
HEART RATE: 87 BPM | WEIGHT: 190.5 LBS | DIASTOLIC BLOOD PRESSURE: 70 MMHG | SYSTOLIC BLOOD PRESSURE: 122 MMHG | BODY MASS INDEX: 30.62 KG/M2 | HEIGHT: 66 IN | OXYGEN SATURATION: 93 %

## 2022-09-06 DIAGNOSIS — J43.9 NOCTURNAL HYPOXEMIA DUE TO EMPHYSEMA: ICD-10-CM

## 2022-09-06 DIAGNOSIS — G43.519 MIGRAINE AURA, PERSISTENT, INTRACTABLE: ICD-10-CM

## 2022-09-06 DIAGNOSIS — G47.36 NOCTURNAL HYPOXEMIA DUE TO EMPHYSEMA: ICD-10-CM

## 2022-09-06 DIAGNOSIS — I10 ESSENTIAL HYPERTENSION: ICD-10-CM

## 2022-09-06 DIAGNOSIS — E03.9 HYPOTHYROIDISM, UNSPECIFIED TYPE: Primary | ICD-10-CM

## 2022-09-06 DIAGNOSIS — E66.9 OBESITY (BMI 30.0-34.9): ICD-10-CM

## 2022-09-06 DIAGNOSIS — J41.0 SIMPLE CHRONIC BRONCHITIS: ICD-10-CM

## 2022-09-06 DIAGNOSIS — E11.9 TYPE 2 DIABETES MELLITUS WITHOUT COMPLICATION, UNSPECIFIED WHETHER LONG TERM INSULIN USE: ICD-10-CM

## 2022-09-06 DIAGNOSIS — F41.8 DEPRESSION WITH ANXIETY: ICD-10-CM

## 2022-09-06 DIAGNOSIS — F10.20 ALCOHOL USE DISORDER, SEVERE, DEPENDENCE: ICD-10-CM

## 2022-09-06 DIAGNOSIS — C91.10 CLL (CHRONIC LYMPHOCYTIC LEUKEMIA): ICD-10-CM

## 2022-09-06 DIAGNOSIS — G43.011 INTRACTABLE MIGRAINE WITHOUT AURA AND WITH STATUS MIGRAINOSUS: ICD-10-CM

## 2022-09-06 PROCEDURE — 99999 PR PBB SHADOW E&M-EST. PATIENT-LVL III: ICD-10-PCS | Mod: PBBFAC,,, | Performed by: INTERNAL MEDICINE

## 2022-09-06 PROCEDURE — 3078F DIAST BP <80 MM HG: CPT | Mod: CPTII,S$GLB,, | Performed by: INTERNAL MEDICINE

## 2022-09-06 PROCEDURE — 3061F NEG MICROALBUMINURIA REV: CPT | Mod: CPTII,S$GLB,, | Performed by: INTERNAL MEDICINE

## 2022-09-06 PROCEDURE — 3008F PR BODY MASS INDEX (BMI) DOCUMENTED: ICD-10-PCS | Mod: CPTII,S$GLB,, | Performed by: INTERNAL MEDICINE

## 2022-09-06 PROCEDURE — 4010F PR ACE/ARB THEARPY RXD/TAKEN: ICD-10-PCS | Mod: CPTII,S$GLB,, | Performed by: INTERNAL MEDICINE

## 2022-09-06 PROCEDURE — 3044F PR MOST RECENT HEMOGLOBIN A1C LEVEL <7.0%: ICD-10-PCS | Mod: CPTII,S$GLB,, | Performed by: INTERNAL MEDICINE

## 2022-09-06 PROCEDURE — 4010F ACE/ARB THERAPY RXD/TAKEN: CPT | Mod: CPTII,S$GLB,, | Performed by: INTERNAL MEDICINE

## 2022-09-06 PROCEDURE — 3061F PR NEG MICROALBUMINURIA RESULT DOCUMENTED/REVIEW: ICD-10-PCS | Mod: CPTII,S$GLB,, | Performed by: INTERNAL MEDICINE

## 2022-09-06 PROCEDURE — 3066F NEPHROPATHY DOC TX: CPT | Mod: CPTII,S$GLB,, | Performed by: INTERNAL MEDICINE

## 2022-09-06 PROCEDURE — 3074F PR MOST RECENT SYSTOLIC BLOOD PRESSURE < 130 MM HG: ICD-10-PCS | Mod: CPTII,S$GLB,, | Performed by: INTERNAL MEDICINE

## 2022-09-06 PROCEDURE — 3074F SYST BP LT 130 MM HG: CPT | Mod: CPTII,S$GLB,, | Performed by: INTERNAL MEDICINE

## 2022-09-06 PROCEDURE — 99214 PR OFFICE/OUTPT VISIT, EST, LEVL IV, 30-39 MIN: ICD-10-PCS | Mod: S$GLB,,, | Performed by: INTERNAL MEDICINE

## 2022-09-06 PROCEDURE — 99214 OFFICE O/P EST MOD 30 MIN: CPT | Mod: S$GLB,,, | Performed by: INTERNAL MEDICINE

## 2022-09-06 PROCEDURE — 3078F PR MOST RECENT DIASTOLIC BLOOD PRESSURE < 80 MM HG: ICD-10-PCS | Mod: CPTII,S$GLB,, | Performed by: INTERNAL MEDICINE

## 2022-09-06 PROCEDURE — 99999 PR PBB SHADOW E&M-EST. PATIENT-LVL III: CPT | Mod: PBBFAC,,, | Performed by: INTERNAL MEDICINE

## 2022-09-06 PROCEDURE — 3044F HG A1C LEVEL LT 7.0%: CPT | Mod: CPTII,S$GLB,, | Performed by: INTERNAL MEDICINE

## 2022-09-06 PROCEDURE — 3066F PR DOCUMENTATION OF TREATMENT FOR NEPHROPATHY: ICD-10-PCS | Mod: CPTII,S$GLB,, | Performed by: INTERNAL MEDICINE

## 2022-09-06 PROCEDURE — 3008F BODY MASS INDEX DOCD: CPT | Mod: CPTII,S$GLB,, | Performed by: INTERNAL MEDICINE

## 2022-09-06 RX ORDER — TOPIRAMATE 25 MG/1
TABLET ORAL
Status: ON HOLD | COMMUNITY
Start: 2022-08-25 | End: 2022-09-27

## 2022-09-06 RX ORDER — PROPRANOLOL HYDROCHLORIDE 10 MG/1
10 TABLET ORAL 2 TIMES DAILY
Status: ON HOLD | COMMUNITY
Start: 2022-08-31 | End: 2022-10-01 | Stop reason: SDUPTHER

## 2022-09-06 RX ORDER — LEVOTHYROXINE SODIUM 50 UG/1
50 TABLET ORAL
Qty: 90 TABLET | Refills: 3 | Status: SHIPPED | OUTPATIENT
Start: 2022-09-06 | End: 2023-08-25

## 2022-09-06 RX ORDER — CHLORZOXAZONE 500 MG/1
500 TABLET ORAL DAILY PRN
Status: ON HOLD | COMMUNITY
Start: 2022-08-31 | End: 2022-12-23

## 2022-09-06 RX ORDER — PANTOPRAZOLE SODIUM 40 MG/1
40 TABLET, DELAYED RELEASE ORAL DAILY
Qty: 90 TABLET | Refills: 3 | Status: SHIPPED | OUTPATIENT
Start: 2022-09-06 | End: 2023-03-10

## 2022-09-06 NOTE — PROGRESS NOTES
"Subjective:       Patient ID: Earl Abdul is a 62 y.o. female.    Chief Complaint: Annual Exam    Here for f/u    63 yo F with PMHx of HTN, HLD, Breast CA, DM, COPD (night oxygen of 3L), MDD (hx SA), Etoh abuse (withdrawal seizures in past and pancreatitis), Former Smoker 1ppd, Hepatic Steatosis, Hypothyroidism, Sarcoidosis of Lung (not active), PUD/Gastritis, hx C. Diff (2014)      Trazodone 400mg ineffective for insomnia. Overdose risk discussed. Polypharmacy discussed. Concomitant VINICIO discussed. Excess serotonin and HA.     Intermittent diarrhea and OAB initially improved c/ stage II interstim      ### Sleep related breathing disorder-- COPD ###  06/2020 CV Dr Gregorio  9/30/2020 PSG with Parasomnia montage: "Little SDB events appeared to have been noted. Snoring was noted to be mild. The patient did not meet split night criteria set by the doctor.     ### DM ###  Home -210     ### Tobacco abuse/ COPD ###  11/14/2020 CT Chest: Multiple small ground-glass opacities in the right lung measuring up to 1.2 cm.  Findings are new when compared with 08/29/2020, which may favor a low-grade infectious or inflammatory process.  Recommend follow-up with noncontrast chest CT in 6-12 months to evaluate for resolution. Mildly enlarged nolvia hepatis lymph nodes and axillary lymph nodes, similar when compared with prior CT     6/30/2022 Nusrat Prabhakar writes "Continue anastrozole. DXA due in 1 year last one showed normal bone density        COVID in January - also treated for ETOH withdawal  7/12/2022    will follow pt q 6 months         Review of Systems   Constitutional:  Negative for chills, fatigue, fever and unexpected weight change.   HENT:  Negative for ear pain, hearing loss, postnasal drip, tinnitus, trouble swallowing and voice change.    Respiratory:  Negative for cough, chest tightness, shortness of breath and wheezing.    Cardiovascular:  Negative for chest pain, palpitations and leg swelling. " "  Gastrointestinal:  Negative for abdominal pain, blood in stool, diarrhea, nausea and vomiting.   Endocrine: Negative for polydipsia, polyphagia and polyuria.   Genitourinary:  Negative for difficulty urinating, dysuria, hematuria and vaginal bleeding.   Skin:  Negative for rash.   Allergic/Immunologic: Negative for food allergies.   Neurological:  Positive for headaches. Negative for dizziness and numbness.   Hematological:  Does not bruise/bleed easily.   Psychiatric/Behavioral:  The patient is not nervous/anxious.      Objective:      Vitals:    09/06/22 1329   BP: 122/70   BP Location: Right arm   Patient Position: Sitting   BP Method: Large (Manual)   Pulse: 87   SpO2: (!) 93%   Weight: 86.4 kg (190 lb 7.6 oz)   Height: 5' 6" (1.676 m)      Physical Exam  Vitals and nursing note reviewed.   Constitutional:       General: She is not in acute distress.     Appearance: Normal appearance. She is well-developed.   HENT:      Head: Normocephalic and atraumatic.      Mouth/Throat:      Pharynx: No oropharyngeal exudate.   Eyes:      General: No scleral icterus.     Conjunctiva/sclera: Conjunctivae normal.      Pupils: Pupils are equal, round, and reactive to light.   Neck:      Thyroid: No thyromegaly.   Cardiovascular:      Rate and Rhythm: Normal rate and regular rhythm.      Heart sounds: Normal heart sounds. No murmur heard.  Pulmonary:      Effort: Pulmonary effort is normal.      Breath sounds: Normal breath sounds. No wheezing or rales.   Abdominal:      General: There is no distension.   Musculoskeletal:         General: No tenderness.   Lymphadenopathy:      Cervical: No cervical adenopathy.   Skin:     General: Skin is warm and dry.   Neurological:      Mental Status: She is alert and oriented to person, place, and time.   Psychiatric:         Behavior: Behavior normal.       Assessment:       1. Hypothyroidism, unspecified type    2. CLL (chronic lymphocytic leukemia)    3. Intractable migraine without " aura and with status migrainosus    4. VINICIO (obstructive sleep apnea)    5. Alcohol use disorder, severe, dependence    6. Depression with anxiety    7. Essential hypertension    8. Type 2 diabetes mellitus without complication, unspecified whether long term insulin use    9. Simple chronic bronchitis    10. Obesity (BMI 30.0-34.9)    11. Migraine aura, persistent, intractable          Plan:       Earl was seen today for annual exam.    Diagnoses and all orders for this visit:    Hypothyroidism, unspecified type  -     levothyroxine (SYNTHROID) 50 MCG tablet; Take 1 tablet (50 mcg total) by mouth before breakfast.  -     TSH; Future    CLL (chronic lymphocytic leukemia)  -     FERRITIN; Future    Nocturnal hypoxia 2/2 COPD   Needs to see mental health. This was again stressed to pt.  Alcohol use disorder, severe, dependence   Needs to see mental health. This was again stressed to pt.  Depression with anxiety   Needs to see mental health. This was again stressed to pt.    Essential hypertension   Controlled and asymptomatic.  Continue current Rx regimen.    Type 2 diabetes mellitus without complication, unspecified whether long term insulin use   Controlled and asymptomatic.  Continue current Rx regimen.  -     Hemoglobin A1C; Future    Simple chronic bronchitis   No acute issues today    Obesity (BMI 30.0-34.9)   Meds not helping but needed.   Patient should eat food, not too much, and most should be vegetables and lean protein. Discussed goal of weight loss to be accomplished by calorie restriction to approx 7880-6247 calories/day. Cumulative psychical activity throughout your day does increase weight loss.  Vary sources in diet (ie different colored vegetables) along with adequate fiber discussed. Consistent and sustainable practices are key. Will review diet periodically to scan for potential for vitamin deficiencies.   Discussed a minimum exercise goal of moderate paced walking or similar level of activity for  30-45 consecutive minutes 4-5 times a week for cardiovascular benefit and overall reduction in morbidity and mortality.    Migraine aura, persistent, intractable  Multifactorial.   -     Coenzyme Q10; Future  -     Vitamin D; Future  -     Vitamin B2; Future  -     Vitamin B12; Future  -     Folate; Future    Other orders  -     pantoprazole (PROTONIX) 40 MG tablet; Take 1 tablet (40 mg total) by mouth once daily.       RTC in 3-4 months or sooner prn     Andrew Chase MD  Internal Medicine-[facility]        Side effects of medication(s) were discussed in detail and patient voiced understanding.  Patient will call back for any issues or complications.

## 2022-09-26 ENCOUNTER — TELEPHONE (OUTPATIENT)
Dept: UROLOGY | Facility: CLINIC | Age: 62
End: 2022-09-26
Payer: COMMERCIAL

## 2022-09-26 ENCOUNTER — HOSPITAL ENCOUNTER (INPATIENT)
Facility: HOSPITAL | Age: 62
LOS: 5 days | Discharge: HOME | DRG: 897 | End: 2022-10-02
Attending: STUDENT IN AN ORGANIZED HEALTH CARE EDUCATION/TRAINING PROGRAM | Admitting: STUDENT IN AN ORGANIZED HEALTH CARE EDUCATION/TRAINING PROGRAM
Payer: COMMERCIAL

## 2022-09-26 DIAGNOSIS — F32.A DEPRESSION, UNSPECIFIED DEPRESSION TYPE: ICD-10-CM

## 2022-09-26 DIAGNOSIS — R00.0 TACHYCARDIA: ICD-10-CM

## 2022-09-26 DIAGNOSIS — F10.20 ALCOHOL USE DISORDER, SEVERE, DEPENDENCE: Chronic | ICD-10-CM

## 2022-09-26 DIAGNOSIS — R07.9 CHEST PAIN: ICD-10-CM

## 2022-09-26 DIAGNOSIS — A41.9 SEPSIS, DUE TO UNSPECIFIED ORGANISM, UNSPECIFIED WHETHER ACUTE ORGAN DYSFUNCTION PRESENT: Primary | ICD-10-CM

## 2022-09-26 DIAGNOSIS — F19.11 HISTORY OF SUBSTANCE ABUSE: Chronic | ICD-10-CM

## 2022-09-26 DIAGNOSIS — E11.65 TYPE 2 DIABETES MELLITUS WITH HYPERGLYCEMIA, WITHOUT LONG-TERM CURRENT USE OF INSULIN: ICD-10-CM

## 2022-09-26 DIAGNOSIS — G43.001 MIGRAINE WITHOUT AURA AND WITH STATUS MIGRAINOSUS, NOT INTRACTABLE: ICD-10-CM

## 2022-09-26 LAB
ALBUMIN SERPL BCP-MCNC: 4.6 G/DL (ref 3.5–5.2)
ALP SERPL-CCNC: 58 U/L (ref 55–135)
ALT SERPL W/O P-5'-P-CCNC: 51 U/L (ref 10–44)
ANION GAP SERPL CALC-SCNC: 31 MMOL/L (ref 8–16)
ANISOCYTOSIS BLD QL SMEAR: SLIGHT
AST SERPL-CCNC: 70 U/L (ref 10–40)
BASOPHILS NFR BLD: 0 % (ref 0–1.9)
BILIRUB SERPL-MCNC: 0.8 MG/DL (ref 0.1–1)
BNP SERPL-MCNC: 17 PG/ML (ref 0–99)
BUN SERPL-MCNC: 20 MG/DL (ref 8–23)
CALCIUM SERPL-MCNC: 9.3 MG/DL (ref 8.7–10.5)
CHLORIDE SERPL-SCNC: 94 MMOL/L (ref 95–110)
CO2 SERPL-SCNC: 12 MMOL/L (ref 23–29)
CREAT SERPL-MCNC: 1 MG/DL (ref 0.5–1.4)
DIFFERENTIAL METHOD: ABNORMAL
EOSINOPHIL NFR BLD: 0 % (ref 0–8)
ERYTHROCYTE [DISTWIDTH] IN BLOOD BY AUTOMATED COUNT: 17.5 % (ref 11.5–14.5)
EST. GFR  (NO RACE VARIABLE): >60 ML/MIN/1.73 M^2
ETHANOL SERPL-MCNC: 165 MG/DL
GLUCOSE SERPL-MCNC: 136 MG/DL (ref 70–110)
HCT VFR BLD AUTO: 37.2 % (ref 37–48.5)
HGB BLD-MCNC: 11.7 G/DL (ref 12–16)
IMM GRANULOCYTES # BLD AUTO: ABNORMAL K/UL (ref 0–0.04)
IMM GRANULOCYTES NFR BLD AUTO: ABNORMAL % (ref 0–0.5)
LIPASE SERPL-CCNC: 21 U/L (ref 4–60)
LYMPHOCYTES NFR BLD: 85 % (ref 18–48)
MCH RBC QN AUTO: 30.5 PG (ref 27–31)
MCHC RBC AUTO-ENTMCNC: 31.5 G/DL (ref 32–36)
MCV RBC AUTO: 97 FL (ref 82–98)
MONOCYTES NFR BLD: 3 % (ref 4–15)
NEUTROPHILS NFR BLD: 12 % (ref 38–73)
NRBC BLD-RTO: 0 /100 WBC
PLATELET # BLD AUTO: 103 K/UL (ref 150–450)
PLATELET BLD QL SMEAR: ABNORMAL
PMV BLD AUTO: 10.2 FL (ref 9.2–12.9)
POTASSIUM SERPL-SCNC: 4.3 MMOL/L (ref 3.5–5.1)
PROT SERPL-MCNC: 7.6 G/DL (ref 6–8.4)
RBC # BLD AUTO: 3.83 M/UL (ref 4–5.4)
SARS-COV-2 RDRP RESP QL NAA+PROBE: NEGATIVE
SMUDGE CELLS BLD QL SMEAR: PRESENT
SODIUM SERPL-SCNC: 137 MMOL/L (ref 136–145)
TROPONIN I SERPL DL<=0.01 NG/ML-MCNC: <0.006 NG/ML (ref 0–0.03)
WBC # BLD AUTO: 27.28 K/UL (ref 3.9–12.7)

## 2022-09-26 PROCEDURE — 99285 PR EMERGENCY DEPT VISIT,LEVEL V: ICD-10-PCS | Mod: CS,,,

## 2022-09-26 PROCEDURE — 96365 THER/PROPH/DIAG IV INF INIT: CPT

## 2022-09-26 PROCEDURE — 96361 HYDRATE IV INFUSION ADD-ON: CPT

## 2022-09-26 PROCEDURE — 83690 ASSAY OF LIPASE: CPT

## 2022-09-26 PROCEDURE — 85027 COMPLETE CBC AUTOMATED: CPT

## 2022-09-26 PROCEDURE — 63600175 PHARM REV CODE 636 W HCPCS

## 2022-09-26 PROCEDURE — 84484 ASSAY OF TROPONIN QUANT: CPT

## 2022-09-26 PROCEDURE — 99285 EMERGENCY DEPT VISIT HI MDM: CPT | Mod: CS,,,

## 2022-09-26 PROCEDURE — 99285 EMERGENCY DEPT VISIT HI MDM: CPT | Mod: 25

## 2022-09-26 PROCEDURE — 85007 BL SMEAR W/DIFF WBC COUNT: CPT

## 2022-09-26 PROCEDURE — 93010 EKG 12-LEAD: ICD-10-PCS | Mod: ,,, | Performed by: INTERNAL MEDICINE

## 2022-09-26 PROCEDURE — 96375 TX/PRO/DX INJ NEW DRUG ADDON: CPT

## 2022-09-26 PROCEDURE — 80053 COMPREHEN METABOLIC PANEL: CPT

## 2022-09-26 PROCEDURE — U0002 COVID-19 LAB TEST NON-CDC: HCPCS | Performed by: EMERGENCY MEDICINE

## 2022-09-26 PROCEDURE — 25000003 PHARM REV CODE 250

## 2022-09-26 PROCEDURE — 85060 PATHOLOGIST REVIEW: ICD-10-PCS | Mod: ,,, | Performed by: PATHOLOGY

## 2022-09-26 PROCEDURE — 83880 ASSAY OF NATRIURETIC PEPTIDE: CPT

## 2022-09-26 PROCEDURE — 93010 ELECTROCARDIOGRAM REPORT: CPT | Mod: ,,, | Performed by: INTERNAL MEDICINE

## 2022-09-26 PROCEDURE — 82077 ASSAY SPEC XCP UR&BREATH IA: CPT

## 2022-09-26 PROCEDURE — 85060 BLOOD SMEAR INTERPRETATION: CPT | Mod: ,,, | Performed by: PATHOLOGY

## 2022-09-26 PROCEDURE — 93005 ELECTROCARDIOGRAM TRACING: CPT

## 2022-09-26 PROCEDURE — 87040 BLOOD CULTURE FOR BACTERIA: CPT | Mod: 59

## 2022-09-26 RX ORDER — METOCLOPRAMIDE HYDROCHLORIDE 5 MG/ML
10 INJECTION INTRAMUSCULAR; INTRAVENOUS
Status: COMPLETED | OUTPATIENT
Start: 2022-09-26 | End: 2022-09-26

## 2022-09-26 RX ORDER — ONDANSETRON 2 MG/ML
4 INJECTION INTRAMUSCULAR; INTRAVENOUS
Status: COMPLETED | OUTPATIENT
Start: 2022-09-26 | End: 2022-09-26

## 2022-09-26 RX ORDER — LORAZEPAM 2 MG/ML
2 INJECTION INTRAMUSCULAR
Status: COMPLETED | OUTPATIENT
Start: 2022-09-26 | End: 2022-09-26

## 2022-09-26 RX ORDER — ACETAMINOPHEN 500 MG
1000 TABLET ORAL
Status: COMPLETED | OUTPATIENT
Start: 2022-09-26 | End: 2022-09-26

## 2022-09-26 RX ADMIN — PIPERACILLIN SODIUM AND TAZOBACTAM SODIUM 4.5 G: 4; .5 INJECTION, POWDER, LYOPHILIZED, FOR SOLUTION INTRAVENOUS at 10:09

## 2022-09-26 RX ADMIN — ONDANSETRON 4 MG: 2 INJECTION INTRAMUSCULAR; INTRAVENOUS at 09:09

## 2022-09-26 RX ADMIN — METOCLOPRAMIDE 10 MG: 5 INJECTION, SOLUTION INTRAMUSCULAR; INTRAVENOUS at 10:09

## 2022-09-26 RX ADMIN — LORAZEPAM 2 MG: 2 INJECTION INTRAMUSCULAR; INTRAVENOUS at 10:09

## 2022-09-26 RX ADMIN — ACETAMINOPHEN 1000 MG: 500 TABLET ORAL at 10:09

## 2022-09-26 RX ADMIN — SODIUM CHLORIDE, SODIUM LACTATE, POTASSIUM CHLORIDE, AND CALCIUM CHLORIDE 1000 ML: .6; .31; .03; .02 INJECTION, SOLUTION INTRAVENOUS at 10:09

## 2022-09-26 NOTE — TELEPHONE ENCOUNTER
----- Message from Sharita Bah RN sent at 9/19/2022  5:23 PM CDT -----    ----- Message -----  From: Khadar Mares  Sent: 9/19/2022   7:31 AM CDT  To: Jerry Re[facility] Staff    Name of Who is Calling: MARTY SALDAÑA       What is the request in detail: The patient is calling to r.s her appointment she missed. Please advise          Can the clinic reply by [facility]: Yes         [facility]: [phone]

## 2022-09-27 PROBLEM — D72.820: Status: ACTIVE | Noted: 2022-09-27

## 2022-09-27 PROBLEM — C91.10: Status: ACTIVE | Noted: 2022-09-27

## 2022-09-27 LAB
ANION GAP SERPL CALC-SCNC: 14 MMOL/L (ref 8–16)
ANION GAP SERPL CALC-SCNC: 15 MMOL/L (ref 8–16)
B-OH-BUTYR BLD STRIP-SCNC: 4.5 MMOL/L (ref 0–0.5)
B-OH-BUTYR BLD STRIP-SCNC: 7.1 MMOL/L (ref 0–0.5)
BACTERIA #/AREA URNS AUTO: ABNORMAL /HPF
BILIRUB UR QL STRIP: NEGATIVE
BUN SERPL-MCNC: 12 MG/DL (ref 8–23)
BUN SERPL-MCNC: 9 MG/DL (ref 8–23)
CALCIUM SERPL-MCNC: 8.5 MG/DL (ref 8.7–10.5)
CALCIUM SERPL-MCNC: 8.9 MG/DL (ref 8.7–10.5)
CHLORIDE SERPL-SCNC: 93 MMOL/L (ref 95–110)
CHLORIDE SERPL-SCNC: 93 MMOL/L (ref 95–110)
CLARITY UR REFRACT.AUTO: CLEAR
CO2 SERPL-SCNC: 24 MMOL/L (ref 23–29)
CO2 SERPL-SCNC: 24 MMOL/L (ref 23–29)
COLOR UR AUTO: YELLOW
CREAT SERPL-MCNC: 1.1 MG/DL (ref 0.5–1.4)
CREAT SERPL-MCNC: 1.1 MG/DL (ref 0.5–1.4)
EST. GFR  (NO RACE VARIABLE): 56.1 ML/MIN/1.73 M^2
EST. GFR  (NO RACE VARIABLE): 56.1 ML/MIN/1.73 M^2
ESTIMATED AVG GLUCOSE: 100 MG/DL (ref 68–131)
GLUCOSE SERPL-MCNC: 174 MG/DL (ref 70–110)
GLUCOSE SERPL-MCNC: 187 MG/DL (ref 70–110)
GLUCOSE UR QL STRIP: NEGATIVE
HBA1C MFR BLD: 5.1 % (ref 4–5.6)
HGB UR QL STRIP: NEGATIVE
INFLUENZA A, MOLECULAR: NEGATIVE
INFLUENZA B, MOLECULAR: NEGATIVE
KETONES UR QL STRIP: ABNORMAL
LACTATE SERPL-SCNC: 2.2 MMOL/L (ref 0.5–2.2)
LEUKOCYTE ESTERASE UR QL STRIP: ABNORMAL
MICROSCOPIC COMMENT: ABNORMAL
NITRITE UR QL STRIP: NEGATIVE
PATH REV BLD -IMP: NORMAL
PH UR STRIP: 6 [PH] (ref 5–8)
POCT GLUCOSE: 101 MG/DL (ref 70–110)
POCT GLUCOSE: 113 MG/DL (ref 70–110)
POCT GLUCOSE: 167 MG/DL (ref 70–110)
POCT GLUCOSE: 97 MG/DL (ref 70–110)
POTASSIUM SERPL-SCNC: 3.7 MMOL/L (ref 3.5–5.1)
POTASSIUM SERPL-SCNC: 3.9 MMOL/L (ref 3.5–5.1)
PROT UR QL STRIP: ABNORMAL
RBC #/AREA URNS AUTO: 1 /HPF (ref 0–4)
SODIUM SERPL-SCNC: 131 MMOL/L (ref 136–145)
SODIUM SERPL-SCNC: 132 MMOL/L (ref 136–145)
SP GR UR STRIP: 1.02 (ref 1–1.03)
SPECIMEN SOURCE: NORMAL
SQUAMOUS #/AREA URNS AUTO: 1 /HPF
URN SPEC COLLECT METH UR: ABNORMAL
WBC #/AREA URNS AUTO: 11 /HPF (ref 0–5)

## 2022-09-27 PROCEDURE — 25000003 PHARM REV CODE 250: Performed by: STUDENT IN AN ORGANIZED HEALTH CARE EDUCATION/TRAINING PROGRAM

## 2022-09-27 PROCEDURE — 81001 URINALYSIS AUTO W/SCOPE: CPT

## 2022-09-27 PROCEDURE — 99223 1ST HOSP IP/OBS HIGH 75: CPT | Mod: ,,, | Performed by: INTERNAL MEDICINE

## 2022-09-27 PROCEDURE — 63600175 PHARM REV CODE 636 W HCPCS: Performed by: STUDENT IN AN ORGANIZED HEALTH CARE EDUCATION/TRAINING PROGRAM

## 2022-09-27 PROCEDURE — 20600001 HC STEP DOWN PRIVATE ROOM

## 2022-09-27 PROCEDURE — 63600175 PHARM REV CODE 636 W HCPCS

## 2022-09-27 PROCEDURE — 80048 BASIC METABOLIC PNL TOTAL CA: CPT | Performed by: STUDENT IN AN ORGANIZED HEALTH CARE EDUCATION/TRAINING PROGRAM

## 2022-09-27 PROCEDURE — 27000221 HC OXYGEN, UP TO 24 HOURS

## 2022-09-27 PROCEDURE — 25000242 PHARM REV CODE 250 ALT 637 W/ HCPCS

## 2022-09-27 PROCEDURE — S5010 5% DEXTROSE AND 0.45% SALINE: HCPCS | Performed by: STUDENT IN AN ORGANIZED HEALTH CARE EDUCATION/TRAINING PROGRAM

## 2022-09-27 PROCEDURE — 94761 N-INVAS EAR/PLS OXIMETRY MLT: CPT

## 2022-09-27 PROCEDURE — 25000003 PHARM REV CODE 250

## 2022-09-27 PROCEDURE — 94640 AIRWAY INHALATION TREATMENT: CPT

## 2022-09-27 PROCEDURE — 87086 URINE CULTURE/COLONY COUNT: CPT

## 2022-09-27 PROCEDURE — 36415 COLL VENOUS BLD VENIPUNCTURE: CPT | Performed by: STUDENT IN AN ORGANIZED HEALTH CARE EDUCATION/TRAINING PROGRAM

## 2022-09-27 PROCEDURE — 83605 ASSAY OF LACTIC ACID: CPT

## 2022-09-27 PROCEDURE — 80321 ALCOHOLS BIOMARKERS 1OR 2: CPT | Performed by: STUDENT IN AN ORGANIZED HEALTH CARE EDUCATION/TRAINING PROGRAM

## 2022-09-27 PROCEDURE — 83036 HEMOGLOBIN GLYCOSYLATED A1C: CPT | Performed by: STUDENT IN AN ORGANIZED HEALTH CARE EDUCATION/TRAINING PROGRAM

## 2022-09-27 PROCEDURE — 99223 PR INITIAL HOSPITAL CARE,LEVL III: ICD-10-PCS | Mod: ,,, | Performed by: INTERNAL MEDICINE

## 2022-09-27 PROCEDURE — 87502 INFLUENZA DNA AMP PROBE: CPT

## 2022-09-27 PROCEDURE — 82010 KETONE BODYS QUAN: CPT | Mod: 91 | Performed by: STUDENT IN AN ORGANIZED HEALTH CARE EDUCATION/TRAINING PROGRAM

## 2022-09-27 PROCEDURE — 99900035 HC TECH TIME PER 15 MIN (STAT)

## 2022-09-27 RX ORDER — IBUPROFEN 200 MG
16 TABLET ORAL
Status: DISCONTINUED | OUTPATIENT
Start: 2022-09-27 | End: 2022-10-02 | Stop reason: HOSPADM

## 2022-09-27 RX ORDER — ACETAMINOPHEN 325 MG/1
650 TABLET ORAL EVERY 4 HOURS PRN
Status: DISCONTINUED | OUTPATIENT
Start: 2022-09-27 | End: 2022-10-02 | Stop reason: HOSPADM

## 2022-09-27 RX ORDER — LEVOTHYROXINE SODIUM 50 UG/1
50 TABLET ORAL
Status: DISCONTINUED | OUTPATIENT
Start: 2022-09-27 | End: 2022-10-02 | Stop reason: HOSPADM

## 2022-09-27 RX ORDER — LORAZEPAM 2 MG/ML
2 INJECTION INTRAMUSCULAR
Status: DISCONTINUED | OUTPATIENT
Start: 2022-09-27 | End: 2022-09-27

## 2022-09-27 RX ORDER — ONDANSETRON 2 MG/ML
4 INJECTION INTRAMUSCULAR; INTRAVENOUS EVERY 8 HOURS PRN
Status: DISCONTINUED | OUTPATIENT
Start: 2022-09-27 | End: 2022-10-02 | Stop reason: HOSPADM

## 2022-09-27 RX ORDER — ENOXAPARIN SODIUM 100 MG/ML
40 INJECTION SUBCUTANEOUS EVERY 24 HOURS
Status: DISCONTINUED | OUTPATIENT
Start: 2022-09-27 | End: 2022-09-29

## 2022-09-27 RX ORDER — FOLIC ACID 1 MG/1
1 TABLET ORAL DAILY
Status: DISCONTINUED | OUTPATIENT
Start: 2022-09-27 | End: 2022-10-02 | Stop reason: HOSPADM

## 2022-09-27 RX ORDER — SODIUM CHLORIDE 0.9 % (FLUSH) 0.9 %
10 SYRINGE (ML) INJECTION EVERY 12 HOURS PRN
Status: DISCONTINUED | OUTPATIENT
Start: 2022-09-27 | End: 2022-10-02 | Stop reason: HOSPADM

## 2022-09-27 RX ORDER — ARIPIPRAZOLE 5 MG/1
5 TABLET ORAL DAILY
Status: DISCONTINUED | OUTPATIENT
Start: 2022-09-27 | End: 2022-10-02 | Stop reason: HOSPADM

## 2022-09-27 RX ORDER — ACETAMINOPHEN 500 MG
500-1500 TABLET ORAL DAILY PRN
Status: ON HOLD | COMMUNITY
End: 2023-11-24

## 2022-09-27 RX ORDER — DEXTROSE MONOHYDRATE AND SODIUM CHLORIDE 5; .45 G/100ML; G/100ML
INJECTION, SOLUTION INTRAVENOUS CONTINUOUS
Status: ACTIVE | OUTPATIENT
Start: 2022-09-27 | End: 2022-09-28

## 2022-09-27 RX ORDER — CEFEPIME HYDROCHLORIDE 1 G/50ML
2 INJECTION, SOLUTION INTRAVENOUS
Status: DISCONTINUED | OUTPATIENT
Start: 2022-09-27 | End: 2022-09-29

## 2022-09-27 RX ORDER — TOPIRAMATE 25 MG/1
25 TABLET ORAL DAILY
Status: DISCONTINUED | OUTPATIENT
Start: 2022-09-27 | End: 2022-09-27

## 2022-09-27 RX ORDER — IBUPROFEN 200 MG
24 TABLET ORAL
Status: DISCONTINUED | OUTPATIENT
Start: 2022-09-27 | End: 2022-10-02 | Stop reason: HOSPADM

## 2022-09-27 RX ORDER — GLUCAGON 1 MG
1 KIT INJECTION
Status: DISCONTINUED | OUTPATIENT
Start: 2022-09-27 | End: 2022-10-02 | Stop reason: HOSPADM

## 2022-09-27 RX ORDER — DIAZEPAM 5 MG/1
10 TABLET ORAL EVERY 6 HOURS
Status: DISCONTINUED | OUTPATIENT
Start: 2022-09-27 | End: 2022-09-29

## 2022-09-27 RX ORDER — DULOXETIN HYDROCHLORIDE 60 MG/1
60 CAPSULE, DELAYED RELEASE ORAL NIGHTLY
Status: DISCONTINUED | OUTPATIENT
Start: 2022-09-27 | End: 2022-10-02 | Stop reason: HOSPADM

## 2022-09-27 RX ORDER — GABAPENTIN 300 MG/1
600 CAPSULE ORAL 2 TIMES DAILY
Status: DISCONTINUED | OUTPATIENT
Start: 2022-09-27 | End: 2022-10-02 | Stop reason: HOSPADM

## 2022-09-27 RX ORDER — NALOXONE HCL 0.4 MG/ML
0.02 VIAL (ML) INJECTION
Status: DISCONTINUED | OUTPATIENT
Start: 2022-09-27 | End: 2022-10-02 | Stop reason: HOSPADM

## 2022-09-27 RX ORDER — TALC
6 POWDER (GRAM) TOPICAL NIGHTLY PRN
Status: DISCONTINUED | OUTPATIENT
Start: 2022-09-27 | End: 2022-10-02 | Stop reason: HOSPADM

## 2022-09-27 RX ORDER — LORAZEPAM 2 MG/ML
2 INJECTION INTRAMUSCULAR EVERY 4 HOURS PRN
Status: DISCONTINUED | OUTPATIENT
Start: 2022-09-27 | End: 2022-09-29

## 2022-09-27 RX ORDER — MULTIVITAMIN
1 TABLET ORAL DAILY
COMMUNITY
End: 2023-05-15

## 2022-09-27 RX ORDER — INSULIN ASPART 100 [IU]/ML
0-5 INJECTION, SOLUTION INTRAVENOUS; SUBCUTANEOUS
Status: DISCONTINUED | OUTPATIENT
Start: 2022-09-27 | End: 2022-10-02 | Stop reason: HOSPADM

## 2022-09-27 RX ORDER — TRAZODONE HYDROCHLORIDE 100 MG/1
300 TABLET ORAL NIGHTLY
Status: DISCONTINUED | OUTPATIENT
Start: 2022-09-27 | End: 2022-09-27

## 2022-09-27 RX ORDER — DIAZEPAM 10 MG/2ML
5 INJECTION INTRAMUSCULAR EVERY 4 HOURS PRN
Status: DISCONTINUED | OUTPATIENT
Start: 2022-09-27 | End: 2022-09-27

## 2022-09-27 RX ADMIN — DEXTROSE AND SODIUM CHLORIDE: 5; .45 INJECTION, SOLUTION INTRAVENOUS at 10:09

## 2022-09-27 RX ADMIN — CEFEPIME 2 G: 2 INJECTION, POWDER, FOR SOLUTION INTRAVENOUS at 08:09

## 2022-09-27 RX ADMIN — TOPIRAMATE 25 MG: 25 TABLET, FILM COATED ORAL at 08:09

## 2022-09-27 RX ADMIN — LEVOTHYROXINE SODIUM 50 MCG: 50 TABLET ORAL at 06:09

## 2022-09-27 RX ADMIN — LORAZEPAM 2 MG: 2 INJECTION INTRAMUSCULAR; INTRAVENOUS at 05:09

## 2022-09-27 RX ADMIN — THIAMINE HYDROCHLORIDE 500 MG: 100 INJECTION, SOLUTION INTRAMUSCULAR; INTRAVENOUS at 09:09

## 2022-09-27 RX ADMIN — LORAZEPAM 2 MG: 2 INJECTION INTRAMUSCULAR; INTRAVENOUS at 08:09

## 2022-09-27 RX ADMIN — FOLIC ACID 1 MG: 1 TABLET ORAL at 08:09

## 2022-09-27 RX ADMIN — VANCOMYCIN HYDROCHLORIDE 2000 MG: 500 INJECTION, POWDER, LYOPHILIZED, FOR SOLUTION INTRAVENOUS at 01:09

## 2022-09-27 RX ADMIN — ARIPIPRAZOLE 5 MG: 5 TABLET ORAL at 08:09

## 2022-09-27 RX ADMIN — DULOXETINE 60 MG: 60 CAPSULE, DELAYED RELEASE ORAL at 02:09

## 2022-09-27 RX ADMIN — DULOXETINE 60 MG: 60 CAPSULE, DELAYED RELEASE ORAL at 08:09

## 2022-09-27 RX ADMIN — SALMETEROL XINAFOATE 1 PUFF: 50 POWDER, METERED ORAL; RESPIRATORY (INHALATION) at 10:09

## 2022-09-27 RX ADMIN — ONDANSETRON 4 MG: 2 INJECTION INTRAMUSCULAR; INTRAVENOUS at 08:09

## 2022-09-27 RX ADMIN — GABAPENTIN 600 MG: 300 CAPSULE ORAL at 08:09

## 2022-09-27 RX ADMIN — LORAZEPAM 2 MG: 2 INJECTION INTRAMUSCULAR; INTRAVENOUS at 11:09

## 2022-09-27 RX ADMIN — DIAZEPAM 10 MG: 5 TABLET ORAL at 05:09

## 2022-09-27 RX ADMIN — ONDANSETRON 4 MG: 2 INJECTION INTRAMUSCULAR; INTRAVENOUS at 09:09

## 2022-09-27 RX ADMIN — THIAMINE HYDROCHLORIDE 500 MG: 100 INJECTION, SOLUTION INTRAMUSCULAR; INTRAVENOUS at 06:09

## 2022-09-27 RX ADMIN — DEXTROSE AND SODIUM CHLORIDE: 5; .45 INJECTION, SOLUTION INTRAVENOUS at 09:09

## 2022-09-27 RX ADMIN — DIAZEPAM 10 MG: 5 TABLET ORAL at 11:09

## 2022-09-27 RX ADMIN — ENOXAPARIN SODIUM 40 MG: 100 INJECTION SUBCUTANEOUS at 05:09

## 2022-09-27 RX ADMIN — THIAMINE HYDROCHLORIDE 500 MG: 100 INJECTION, SOLUTION INTRAMUSCULAR; INTRAVENOUS at 02:09

## 2022-09-27 RX ADMIN — THIAMINE HYDROCHLORIDE 500 MG: 100 INJECTION, SOLUTION INTRAMUSCULAR; INTRAVENOUS at 10:09

## 2022-09-27 RX ADMIN — DIAZEPAM 10 MG: 5 TABLET ORAL at 06:09

## 2022-09-27 RX ADMIN — SODIUM CHLORIDE, SODIUM LACTATE, POTASSIUM CHLORIDE, AND CALCIUM CHLORIDE 1000 ML: .6; .31; .03; .02 INJECTION, SOLUTION INTRAVENOUS at 06:09

## 2022-09-27 NOTE — ASSESSMENT & PLAN NOTE
Patient with Hypoxic Respiratory failure which is Chronic.  she is on home oxygen at 3 LPM. Supplemental oxygen was provided and noted-  .   Signs/symptoms of respiratory failure include- increased work of breathing. Contributing diagnoses includes - COPD Labs and images were reviewed. Patient Has not had a recent ABG. Will treat underlying causes and adjust management of respiratory failure as follows-     -continue 3L NC

## 2022-09-27 NOTE — ASSESSMENT & PLAN NOTE
Patient in alcohol withdrawal, long-standing history of alcohol dependence, previous hospitalizations for withdrawal    -high-dose thiamine to prevent wernicke encephalopathy  -management of withdrawal

## 2022-09-27 NOTE — PHARMACY MED REC
"Admission Medication History     The home medication history was taken by Fabiola Valladares.    You may go to "Admission" then "Reconcile Home Medications" tabs to review and/or act upon these items.     The home medication list has been updated by the Pharmacy department.   Please read ALL comments highlighted in yellow.   Please address this information as you see fit.    Feel free to contact us if you have any questions or require assistance.      The medications listed below were removed from the home medication list. Please reorder if appropriate:  Patient reports no longer taking the following medication(s):  HYDROXYZINE 25 MG  TOPIRAMATE 25 MG  OMEGA 3 FISH OIL  TRAMADOL 50 MG      Medications listed below were obtained from: Patient/family  PTA Medications   Medication Sig    acetaminophen (TYLENOL) 500 MG tablet Take 1,000 mg by mouth every 4 (four) hours as needed for Pain.    anastrozole (ARIMIDEX) 1 mg Tab Take 1 tablet (1 mg total) by mouth every morning.    ARIPiprazole (ABILIFY) 5 MG Tab Take 5 mg by mouth once daily.    ATROVENT HFA 17 mcg/actuation inhaler Inhale 2 puffs into the lungs every 6 (six) hours as needed for Wheezing.    chlorzoxazone (PARAFON FORTE) 500 mg Tab Take 500 mg by mouth daily as needed (muscle spasms).    DULoxetine (CYMBALTA) 60 MG capsule Take 60 mg by mouth every evening.    gabapentin (NEURONTIN) 600 MG tablet Take 1 tablet (600 mg total) by mouth 2 (two) times daily.    levothyroxine (SYNTHROID) 50 MCG tablet Take 1 tablet (50 mcg total) by mouth before breakfast.    lisinopriL (PRINIVIL,ZESTRIL) 20 MG tablet Take 20 mg by mouth every morning.    metFORMIN (GLUCOPHAGE-XR) 500 MG ER 24hr tablet Take 2 tablets (1,000 mg total) by mouth 2 (two) times daily with meals.    multivitamin (THERAGRAN) per tablet Take 1 tablet by mouth once daily.    ondansetron (ZOFRAN-ODT) 4 MG TbDL Take 2 tablets (8 mg total) by mouth every 12 (twelve) hours as needed (Nausea).    pantoprazole " (PROTONIX) 40 MG tablet Take 1 tablet (40 mg total) by mouth once daily.    propranoloL (INDERAL) 10 MG tablet Take 10 mg by mouth 2 (two) times daily.    tiotropium-olodateroL (STIOLTO RESPIMAT) 2.5-2.5 mcg/actuation Mist  Inhale 1 puff into the lungs 2 (two) times daily as needed (wheezing). Controller)    traZODone (DESYREL) 300 MG tablet Take 1 tablet (300 mg total) by mouth nightly.    atorvastatin (LIPITOR) 10 MG tablet Take 10 mg by mouth every morning.       Potential issues to be addressed PRIOR TO DISCHARGE  Patient reported not taking the following medications: (SEMAGLUTIDE). These medications remain on the home medication list. Please address accordingly.     Please discuss with the patient barriers to adherence with medication treatment plans    Patient requires education regarding drug therapies     Prescriptions could not be filled prior to admission: (SEMAGLUTIDE)    Fabiola Valladares  EXT [id number]                  .

## 2022-09-27 NOTE — ED TRIAGE NOTES
Earl Abdul, a 62 y.o. female presents to the ED w/ complaint of flu like symptoms. 84% on RA, 97% on 3L. Pt c/o N/V. Denies CP, SOB, and HA at this time.    Triage note:  Chief Complaint   Patient presents with    Flu-Like Symptoms     84% RA. 97% 3L     Review of patient's allergies indicates:   Allergen Reactions    Lortab [hydrocodone-acetaminophen] Itching    Promethazine Itching and Other (See Comments)     Past Medical History:   Diagnosis Date    Anemia     Anemia     Controlled type 2 diabetes mellitus without complication, without long-term current use of insulin 11/30/2021    COPD (chronic obstructive pulmonary disease)     Depression     Diverticulitis     Fatty liver     GERD (gastroesophageal reflux disease)     Hyperlipidemia     Hypertension     Pancreatitis     Peptic ulcer disease     Polysubstance abuse     Posterior reversible encephalopathy syndrome     Sarcoidosis of lung     Sarcoidosis of lung     over 30 yrs ago    Seizures     7/2017    Suicide attempt     Suicide ideation

## 2022-09-27 NOTE — SUBJECTIVE & OBJECTIVE
Past Medical History:   Diagnosis Date    Anemia     Anemia     Controlled type 2 diabetes mellitus without complication, without long-term current use of insulin 11/30/2021    COPD (chronic obstructive pulmonary disease)     Depression     Diverticulitis     Fatty liver     GERD (gastroesophageal reflux disease)     Hyperlipidemia     Hypertension     Pancreatitis     Peptic ulcer disease     Polysubstance abuse     Posterior reversible encephalopathy syndrome     Sarcoidosis of lung     Sarcoidosis of lung     over 30 yrs ago    Seizures     7/2017    Suicide attempt     Suicide ideation        Past Surgical History:   Procedure Laterality Date    APPENDECTOMY      BILATERAL MASTECTOMY Bilateral 10/29/2020    Procedure: MASTECTOMY, BILATERAL;  Surgeon: Baylee Kevin MD;  Location: [address];  Service: General;  Laterality: Bilateral;    BREAST REVISION SURGERY Bilateral 2/11/2021    Procedure: BREAST REVISION SURGERY;  Surgeon: Scottie Johnson MD;  Location: [facility] OR [address] [place] [address];  Service: Plastics;  Laterality: Bilateral;    COLONOSCOPY N/A 7/28/2017    Procedure: COLONOSCOPY;  Surgeon: Aaron Alvarado MD;  Location: [facility];  Service: Endoscopy;  Laterality: N/A;    ESOPHAGOGASTRODUODENOSCOPY  10/7/2016, 11/6/2014    2016 - gastritis, duodenitis, 2014 erosive gastritis    ESOPHAGOGASTRODUODENOSCOPY N/A 2/11/2020    Procedure: ESOPHAGOGASTRODUODENOSCOPY (EGD);  Surgeon: Fawn Garrido MD;  Location: [facility];  Service: Endoscopy;  Laterality: N/A;    ESOPHAGOGASTRODUODENOSCOPY N/A 4/19/2021    Procedure: EGD (ESOPHAGOGASTRODUODENOSCOPY);  Surgeon: Paramjit Martino MD;  Location: [facility];  Service: Endoscopy;  Laterality: N/A;    FLEXIBLE SIGMOIDOSCOPY  11/06/2014    colitis    HYSTERECTOMY      IMPLANTATION OF PERMANENT SACRAL NERVE STIMULATOR N/A 7/12/2022    Procedure: INSERTION, NEUROSTIMULATOR, PERMANENT, SACRAL;  Surgeon: Juaquin Edwards MD;  Location: [facility] OR [address] [place] [address];  Service:  Urology;  Laterality: N/A;  1hr    INJECTION FOR SENTINEL NODE IDENTIFICATION Right 10/29/2020    Procedure: INJECTION, FOR SENTINEL NODE IDENTIFICATION;  Surgeon: Baylee Kevin MD;  Location: [facility] OR [address] [place] [address];  Service: General;  Laterality: Right;    INJECTION OF JOINT Right 10/10/2019    Procedure: Injection, Joint RIGHT ILIOPSOAS BURSA/TENDON INJECTION AND RIGHT GLUTEAL TENDON INJECTION WITH STEROID AND LIDOCAINE;  Surgeon: Guillaume Rico MD;  Location: [facility];  Service: Pain Management;  Laterality: Right;  NEEDS CONSENT    INSERTION OF BREAST TISSUE EXPANDER Bilateral 10/29/2020    Procedure: INSERTION, TISSUE EXPANDER, BREAST;  Surgeon: Scottie Johnson MD;  Location: [facility] OR [address] [place] [address];  Service: Plastics;  Laterality: Bilateral;  Right breast: 1082 g  Left breast: 1076 g    LIPOSUCTION Bilateral 2/11/2021    Procedure: LIPOSUCTION;  Surgeon: Scottie Johnson MD;  Location: [facility] OR [address] [place] [address];  Service: Plastics;  Laterality: Bilateral;    mediastenoscopy      REPLACEMENT OF IMPLANT OF BREAST Bilateral 2/11/2021    Procedure: REPLACEMENT, IMPLANT, BREAST;  Surgeon: Scottie Johnson MD;  Location: [address];  Service: Plastics;  Laterality: Bilateral;    SENTINEL LYMPH NODE BIOPSY Right 10/29/2020    Procedure: BIOPSY, LYMPH NODE, SENTINEL;  Surgeon: Baylee Kevin MD;  Location: [address];  Service: General;  Laterality: Right;    TONSILLECTOMY N/A 1970    TUBAL LIGATION         Review of patient's allergies indicates:   Allergen Reactions    Lortab [hydrocodone-acetaminophen] Itching    Promethazine Itching and Other (See Comments)       Current Facility-Administered Medications on File Prior to Encounter   Medication    albuterol sulfate nebulizer solution 2.5 mg     Current Outpatient Medications on File Prior to Encounter   Medication Sig    anastrozole (ARIMIDEX) 1 mg Tab Take 1 tablet (1 mg total) by mouth every morning.    ARIPiprazole (ABILIFY) 5 MG Tab Take 5 mg by  mouth once daily.    atorvastatin (LIPITOR) 10 MG tablet Take 10 mg by mouth every morning.    ATROVENT HFA 17 mcg/actuation inhaler Inhale 2 puffs into the lungs every 6 (six) hours as needed for Wheezing.    chlorzoxazone (PARAFON FORTE) 500 mg Tab Take 500 mg by mouth 2 (two) times daily as needed.    DULoxetine (CYMBALTA) 60 MG capsule Take 60 mg by mouth every evening.    gabapentin (NEURONTIN) 600 MG tablet Take 1 tablet (600 mg total) by mouth 2 (two) times daily.    hydrOXYzine pamoate (VISTARIL) 25 MG Cap Take 25 mg by mouth 3 (three) times daily.    levothyroxine (SYNTHROID) 50 MCG tablet Take 1 tablet (50 mcg total) by mouth before breakfast.    lisinopriL (PRINIVIL,ZESTRIL) 20 MG tablet Take 1 tablet (20 mg total) by mouth once daily. (Patient taking differently: Take 20 mg by mouth every morning.)    metFORMIN (GLUCOPHAGE-XR) 500 MG ER 24hr tablet Take 2 tablets (1,000 mg total) by mouth 2 (two) times daily with meals.    omega-3 fatty acids 1,000 mg Cap Take 1 capsule by mouth once daily.    ondansetron (ZOFRAN-ODT) 4 MG TbDL Take 2 tablets (8 mg total) by mouth every 12 (twelve) hours as needed (Nausea).    pantoprazole (PROTONIX) 40 MG tablet Take 1 tablet (40 mg total) by mouth once daily.    propranoloL (INDERAL) 10 MG tablet Take 10 mg by mouth 2 (two) times daily.    semaglutide (OZEMPIC) 0.25 mg or 0.5 mg(2 mg/1.5 mL) pen injector Inject 0.5 mg into the skin every 7 days. Start with 0.25 mg weekly for 4 weeks and then increase to 0.5 mg if you are tolerating this dose (Patient taking differently: Inject 0.5 mg into the skin every 7 days. Start with 0.25 mg weekly for 4 weeks and then increase to 0.5 mg if you are tolerating this dose  SATURDAYS)    tiotropium-olodateroL (STIOLTO RESPIMAT) 2.5-2.5 mcg/actuation Mist Inhale 1 puff into the lungs 2 (two) times a day. Controller    topiramate (TOPAMAX) 25 MG tablet Take by mouth.    traMADoL (ULTRAM) 50 mg tablet Take 1 tablet (50 mg total) by mouth  every 6 (six) hours.    traZODone (DESYREL) 300 MG tablet Take 1 tablet (300 mg total) by mouth nightly.     Family History       Problem Relation (Age of Onset)    Breast cancer Maternal Aunt, Daughter    Colon cancer Maternal Uncle    Diabetes Father, Mother    Heart attack Father    Hypertension Father, Mother          Tobacco Use    Smoking status: Every Day     Packs/day: 0.50     Years: 30.00     Pack years: 15.00     Types: Vaping with nicotine, Cigarettes     Last attempt to quit: 2021     Years since quittin.6    Smokeless tobacco: Never   Substance and Sexual Activity    Alcohol use: Yes     Comment: vodka daily (half a regular bottle)    Drug use: Yes     Types: Marijuana     Comment: gummies    Sexual activity: Yes     Birth control/protection: Surgical     Review of Systems   Constitutional:  Positive for chills and fever.   HENT:  Negative for congestion and facial swelling.    Eyes:  Negative for visual disturbance.   Respiratory:  Positive for cough and shortness of breath.    Cardiovascular:  Negative for chest pain and leg swelling.   Gastrointestinal:  Positive for nausea and vomiting. Negative for diarrhea.   Genitourinary:  Negative for difficulty urinating and dysuria.   Musculoskeletal:  Negative for arthralgias.   Skin:  Negative for rash.   Neurological:  Positive for headaches.   Psychiatric/Behavioral:  Positive for agitation.    Objective:     Vital Signs (Most Recent):  Temp: 98.8 °F (37.1 °C) (22)  Pulse: (!) 124 (22)  Resp: 18 (22)  BP: (!) 164/74 (22)  SpO2: 95 % (22)   Vital Signs (24h Range):  Temp:  [98.8 °F (37.1 °C)-101 °F (38.3 °C)] 98.8 °F (37.1 °C)  Pulse:  [122-133] 124  Resp:  [18] 18  SpO2:  [91 %-97 %] 95 %  BP: (108-164)/(64-80) 164/74     Weight: 86.2 kg (190 lb)  Body mass index is 30.67 kg/m².    Physical Exam  Constitutional:       General: She is in acute distress.   HENT:      Head: Normocephalic and  atraumatic.      Mouth/Throat:      Mouth: Mucous membranes are moist.      Pharynx: Oropharynx is clear.   Eyes:      General: No scleral icterus.     Extraocular Movements: Extraocular movements intact.      Conjunctiva/sclera: Conjunctivae normal.   Cardiovascular:      Rate and Rhythm: Regular rhythm. Tachycardia present.      Heart sounds: Normal heart sounds.   Pulmonary:      Effort: Pulmonary effort is normal.      Breath sounds: Normal breath sounds. No wheezing or rales.      Comments: 3L NC  Abdominal:      General: Abdomen is flat. Bowel sounds are normal.      Palpations: Abdomen is soft.   Musculoskeletal:         General: Normal range of motion.      Cervical back: Normal range of motion.      Right lower leg: No edema.      Left lower leg: No edema.   Skin:     General: Skin is warm and dry.   Neurological:      General: No focal deficit present.      Mental Status: She is alert.      Comments: Agitated, tremulous   Psychiatric:         Mood and Affect: Mood normal.           Significant Labs: All pertinent labs within the past 24 hours have been reviewed.    Significant Imaging: I have reviewed all pertinent imaging results/findings within the past 24 hours.

## 2022-09-27 NOTE — ED PROVIDER NOTES
Encounter Date: 9/26/2022       History     Chief Complaint   Patient presents with    Flu-Like Symptoms     84% RA. 97% 3L     63 y/o F with a PMHx of T2DM, COPD on QHS O2, HLD, HTN and migraines presents to the ED with  at bedside stating that she is going through alcohol withdrawals.  Her last drink was vodka around 2:00 p.m. she states that she has had 4 drinks today.  Patient also reports a headache which is similar to her previous migraines, 3 day history of vomiting and 1 episode syncope today.  She denies chest pain, shortness of breath, abdominal pain, fever, chills, diarrhea, dysuria and hematuria.  No other complaints at this time.    The history is provided by the patient and the spouse.   Review of patient's allergies indicates:   Allergen Reactions    Lortab [hydrocodone-acetaminophen] Itching    Promethazine Itching and Other (See Comments)     Past Medical History:   Diagnosis Date    Anemia     Anemia     Controlled type 2 diabetes mellitus without complication, without long-term current use of insulin 11/30/2021    COPD (chronic obstructive pulmonary disease)     Depression     Diverticulitis     Fatty liver     GERD (gastroesophageal reflux disease)     Hyperlipidemia     Hypertension     Pancreatitis     Peptic ulcer disease     Polysubstance abuse     Posterior reversible encephalopathy syndrome     Sarcoidosis of lung     Sarcoidosis of lung     over 30 yrs ago    Seizures     7/2017    Suicide attempt     Suicide ideation      Past Surgical History:   Procedure Laterality Date    APPENDECTOMY      BILATERAL MASTECTOMY Bilateral 10/29/2020    Procedure: MASTECTOMY, BILATERAL;  Surgeon: Baylee Kevin MD;  Location: [facility] OR [address] [place] [address];  Service: General;  Laterality: Bilateral;    BREAST REVISION SURGERY Bilateral 2/11/2021    Procedure: BREAST REVISION SURGERY;  Surgeon: Scottie Johnson MD;  Location: [facility] OR [address] [place] [address];  Service: Plastics;  Laterality: Bilateral;    COLONOSCOPY N/A  7/28/2017    Procedure: COLONOSCOPY;  Surgeon: Aaron Alvarado MD;  Location: [facility] ENDO;  Service: Endoscopy;  Laterality: N/A;    ESOPHAGOGASTRODUODENOSCOPY  10/7/2016, 11/6/2014    2016 - gastritis, duodenitis, 2014 erosive gastritis    ESOPHAGOGASTRODUODENOSCOPY N/A 2/11/2020    Procedure: ESOPHAGOGASTRODUODENOSCOPY (EGD);  Surgeon: Fawn Garrido MD;  Location: [facility] ENDO;  Service: Endoscopy;  Laterality: N/A;    ESOPHAGOGASTRODUODENOSCOPY N/A 4/19/2021    Procedure: EGD (ESOPHAGOGASTRODUODENOSCOPY);  Surgeon: Paramjit Martino MD;  Location: [facility] ENDO;  Service: Endoscopy;  Laterality: N/A;    FLEXIBLE SIGMOIDOSCOPY  11/06/2014    colitis    HYSTERECTOMY      IMPLANTATION OF PERMANENT SACRAL NERVE STIMULATOR N/A 7/12/2022    Procedure: INSERTION, NEUROSTIMULATOR, PERMANENT, SACRAL;  Surgeon: Juaquin Edwards MD;  Location: [facility] OR [address] [place] [address];  Service: Urology;  Laterality: N/A;  1hr    INJECTION FOR SENTINEL NODE IDENTIFICATION Right 10/29/2020    Procedure: INJECTION, FOR SENTINEL NODE IDENTIFICATION;  Surgeon: Baylee Kevin MD;  Location: [facility] OR [address] [place] [address];  Service: General;  Laterality: Right;    INJECTION OF JOINT Right 10/10/2019    Procedure: Injection, Joint RIGHT ILIOPSOAS BURSA/TENDON INJECTION AND RIGHT GLUTEAL TENDON INJECTION WITH STEROID AND LIDOCAINE;  Surgeon: Guillaume Rico MD;  Location: [facility];  Service: Pain Management;  Laterality: Right;  NEEDS CONSENT    INSERTION OF BREAST TISSUE EXPANDER Bilateral 10/29/2020    Procedure: INSERTION, TISSUE EXPANDER, BREAST;  Surgeon: Scottie Johnson MD;  [facility];  Service: Plastics;  Laterality: Bilateral;  Right breast: 1082 g  Left breast: 1076 g    LIPOSUCTION Bilateral 2/11/2021    Procedure: LIPOSUCTION;  Surgeon: Scottie Johnson MD;  Location: [facility] OR [address] [place] [address];  Service: Plastics;  Laterality: Bilateral;    mediastenoscopy      REPLACEMENT OF IMPLANT OF BREAST Bilateral 2/11/2021    Procedure:  REPLACEMENT, IMPLANT, BREAST;  Surgeon: Scottie Johnson MD;  Location: [facility] OR [address] [place] [address];  Service: Plastics;  Laterality: Bilateral;    SENTINEL LYMPH NODE BIOPSY Right 10/29/2020    Procedure: BIOPSY, LYMPH NODE, SENTINEL;  Surgeon: Baylee Kevin MD;  [facility];  Service: General;  Laterality: Right;    TONSILLECTOMY N/A 1970    TUBAL LIGATION       Family History   Problem Relation Age of Onset    Heart attack Father     Diabetes Father     Hypertension Father     Diabetes Mother     Hypertension Mother     Breast cancer Maternal Aunt     Colon cancer Maternal Uncle     Breast cancer Daughter     Ovarian cancer Neg Hx     Cancer Neg Hx      Social History     Tobacco Use    Smoking status: Every Day     Packs/day: 0.50     Years: 30.00     Pack years: 15.00     Types: Vaping with nicotine, Cigarettes     Last attempt to quit: 2021     Years since quittin.6    Smokeless tobacco: Never   Substance Use Topics    Alcohol use: Yes     Comment: vodka daily (half a regular bottle)    Drug use: Yes     Types: Marijuana     Comment: gummies     Review of Systems   Constitutional:  Negative for activity change, chills and fever.   HENT:  Negative for congestion, ear pain and sore throat.    Respiratory:  Negative for shortness of breath and stridor.    Cardiovascular:  Negative for chest pain and palpitations.   Gastrointestinal:  Positive for vomiting. Negative for abdominal pain and nausea.   Genitourinary:  Negative for dysuria and urgency.   Musculoskeletal:  Negative for back pain.   Skin:  Negative for rash.   Allergic/Immunologic: Negative for environmental allergies and food allergies.   Neurological:  Positive for syncope and headaches. Negative for dizziness and weakness.   Hematological:  Does not bruise/bleed easily.     Physical Exam     Initial Vitals [22]   BP Pulse Resp Temp SpO2   112/70 (!) 122 18 (!) 101 °F (38.3 °C) 97 %      MAP       --         Physical  Exam    Nursing note and vitals reviewed.  Constitutional: Vital signs are normal. She appears well-developed and well-nourished. She is not diaphoretic. She appears distressed (Moderate).   CIWA score 21.  Appears tremulous and agitated.   HENT:   Head: Normocephalic and atraumatic.   Right Ear: External ear normal.   Left Ear: External ear normal.   Eyes: EOM are normal. Right eye exhibits no discharge. Left eye exhibits no discharge.   Neck: Trachea normal. Neck supple. No thyroid mass present.   Normal range of motion.  Cardiovascular:  Normal rate, regular rhythm, normal heart sounds and intact distal pulses.     Exam reveals no gallop and no friction rub.       No murmur heard.  Pulmonary/Chest: Breath sounds normal. No respiratory distress. She has no wheezes. She has no rhonchi. She has no rales.   Abdominal: Abdomen is soft. Bowel sounds are normal. She exhibits no distension. There is no abdominal tenderness. There is no rebound and no guarding.   Musculoskeletal:         General: Edema (Mild, bilateral and symmetric.) present. No tenderness.      Cervical back: Normal range of motion and neck supple.     Neurological: She is alert and oriented to person, place, and time. She has normal strength. No cranial nerve deficit or sensory deficit. GCS score is 15. GCS eye subscore is 4. GCS verbal subscore is 5. GCS motor subscore is 6.   Skin: Skin is warm and dry. Capillary refill takes less than 2 seconds. No rash noted.   Psychiatric: She has a normal mood and affect.       ED Course   Procedures  Labs Reviewed   CBC W/ AUTO DIFFERENTIAL - Abnormal; Notable for the following components:       Result Value    WBC 27.28 (*)     RBC 3.83 (*)     Hemoglobin 11.7 (*)     MCHC 31.5 (*)     RDW 17.5 (*)     Platelets 103 (*)     Gran % 12.0 (*)     Lymph % 85.0 (*)     Mono % 3.0 (*)     Platelet Estimate Decreased (*)     All other components within normal limits   COMPREHENSIVE METABOLIC PANEL - Abnormal; Notable  for the following components:    Chloride 94 (*)     CO2 12 (*)     Glucose 136 (*)     AST 70 (*)     ALT 51 (*)     Anion Gap 31 (*)     All other components within normal limits   ALCOHOL,MEDICAL (ETHANOL) - Abnormal; Notable for the following components:    Alcohol, Serum 165 (*)     All other components within normal limits   CULTURE, BLOOD   CULTURE, BLOOD   SARS-COV-2 RNA AMPLIFICATION, QUAL   TROPONIN I   B-TYPE NATRIURETIC PEPTIDE   LIPASE   LACTIC ACID, PLASMA   URINALYSIS, REFLEX TO URINE CULTURE          Imaging Results              CT Head Without Contrast (Final result)  Result time 09/27/22 00:00:38      Final result by Mynor Yang MD (09/27/22 00:00:38)                   Impression:      No acute intracranial process.      Electronically signed by: Mynor Yang  Date:    09/27/2022  Time:    00:00               Narrative:    EXAMINATION:  CT HEAD WITHOUT CONTRAST    CLINICAL HISTORY:  Syncope, recurrent;    TECHNIQUE:  Low dose axial CT images obtained throughout the head without intravenous contrast.    COMPARISON:  06/10/2022    FINDINGS:  Intracranial compartment:    Ventricles and sulci are normal in size for age without evidence of hydrocephalus. No extra-axial blood or fluid collections.    The brain parenchyma appears normal. No parenchymal mass, hemorrhage, edema or major vascular distribution infarct.    Skull/extracranial contents (limited evaluation): No fracture. Mastoid air cells and paranasal sinuses are essentially clear.                                       CT Cervical Spine Without Contrast (Final result)  Result time 09/27/22 00:09:17      Final result by Mynor Yang MD (09/27/22 00:09:17)                   Impression:      1. No acute abnormality  2. Multilevel chronic degenerative changes.      Electronically signed by: Mynor Yang  Date:    09/27/2022  Time:    00:09               Narrative:    EXAMINATION:  CT CERVICAL SPINE WITHOUT CONTRAST    CLINICAL  HISTORY:  Neck trauma, intoxicated or obtunded (Age >= 16y);    TECHNIQUE:  Low dose axial CT images through the cervical spine, with sagittal and coronal reformations.  Contrast was not administered.    COMPARISON:  None    FINDINGS:  No acute fractures of the cervical spine.  Slight straightening of the normal cervical lordosis.    Moderate disc space narrowing at C5-6 and C3-4.  Mild disc space narrowing elsewhere.    Bilateral facet arthropathy most prominent at C2-3 and C6-7 on the left and C3-4 and C4-5 on the right.    Severe foraminal narrowing at C2-3 on the left, C3-4 bilaterally, C5-6 on the left.    Central canal is adequately maintained.    Limited evaluation of the intraspinal contents demonstrates no hematoma or mass.Paraspinal soft tissues exhibit no acute abnormalities.    Multiple minimally prominent, though predominantly subcentimeter cervical lymph nodes bilaterally.  This could be reactive.  Recommend surveillance.                                       X-Ray Chest AP Portable (Final result)  Result time 09/26/22 23:19:54      Final result by Emery Burt MD (09/26/22 23:19:54)                   Impression:      No acute findings in the chest.    Underinflation with hypoventilatory change.  Increased soft tissue attenuation over the lung bases bilaterally.      Electronically signed by: Emery Burt MD  Date:    09/26/2022  Time:    23:19               Narrative:    EXAMINATION:  XR CHEST AP PORTABLE    CLINICAL HISTORY:  Sepsis;    TECHNIQUE:  Single frontal view of the chest was performed.    COMPARISON:  06/10/2022.    FINDINGS:  Underinflated lungs with hypoventilatory change and crowding of markings.  Increased soft tissue attenuation over the lung bases.  Surgical clips in the bilateral breasts, similar to prior.    No consolidation, pleural effusion or pneumothorax.    Cardiomediastinal silhouette is similar to prior.                                       Medications   vancomycin  2 g in dextrose 5 % 500 mL IVPB (has no administration in time range)   lactated ringers bolus 1,000 mL (1,000 mLs Intravenous New Bag 9/26/22 2202)   metoclopramide HCl injection 10 mg (10 mg Intravenous Given 9/26/22 2202)   acetaminophen tablet 1,000 mg (1,000 mg Oral Given 9/26/22 2201)   ondansetron injection 4 mg (4 mg Intravenous Given 9/26/22 2158)   lorazepam injection 2 mg (2 mg Intravenous Given 9/26/22 2240)   piperacillin-tazobactam 4.5 g in sodium chloride 0.9% 100 mL IVPB (ready to mix system) (4.5 g Intravenous New Bag 9/26/22 2245)     Medical Decision Making:   Initial Assessment:   62-year-old female who appears tremulous and in moderate distress presents to the ED complaining of alcohol withdrawal.  ABC's intact.  Physical exam shows signs of alcohol withdrawal.  Differential Diagnosis:   Intoxication  Drug withdrawal  Electrolyte abnormality  Anemia  Infection - pneumonia versus UTI  ED Management:  Following initial evaluation I gave the patient Zofran, Tylenol, Reglan and 2 mg of lorazepam for symptomatic control.  Patient's initial vital signs indicated that she was febrile and tachycardic.  CBC shows a white count of 27.28 which indicates that patient is likely septic.  I initiated fluids and broad-spectrum antibiotics.  Ethanol level is 165 and with signs and symptoms of draw all this is consistent.  CT head, CT C-spine and chest x-ray are unremarkable.  Patient will likely be admitted to Hospital Medicine for management.           ED Course as of 09/27/22 0039   Mon Sep 26, 2022   2305 WBC(!): 27.28 [NN]      ED Course User Index  [NN] Glory Chase MD                 Clinical Impression:   Final diagnoses:  [R00.0] Tachycardia  [A41.9] Sepsis, due to unspecified organism, unspecified whether acute organ dysfunction present (Primary)      ED Disposition Condition    Admit Stable                Marcus Salazar MD  Resident  09/27/22 0039

## 2022-09-27 NOTE — ASSESSMENT & PLAN NOTE
Patient's FSGs are controlled on current medication regimen.  Last A1c reviewed-   Lab Results   Component Value Date    HGBA1C 6.6 (H) 03/10/2022     Most recent fingerstick glucose reviewed-   Recent Labs   Lab 09/27/22  0217   POCTGLUCOSE 97     Current correctional scale  Low  May adjust anti-hyperglycemic dose as follows-   Antihyperglycemics (From admission, onward)    Start     Stop Route Frequency Ordered    09/27/22 0248  insulin aspart U-100 pen 0-5 Units         -- SubQ Before meals & nightly PRN 09/27/22 0149        Hold Oral hypoglycemics while patient is in the hospital.

## 2022-09-27 NOTE — PROGRESS NOTES
Pharmacokinetic Initial Assessment: IV Vancomycin    Assessment/Plan:    Ms. Abdul received vancomycin with loading dose of 2000 mg once in the ED.  - Her renal function appears stable and at baseline.  - Will schedule a maintenance dose of vancomycin 1250 mg IV every 24 hours.  - Desired empiric serum trough concentration is 10 to 20 mcg/mL.  - Draw vancomycin trough level 60 min prior to third dose on 9/29 at approximately 0000.  - Please draw random level sooner than scheduled trough if renal function changes significantly.    Pharmacy will continue to follow and monitor vancomycin.      Please contact pharmacy at extension [phone] with any questions regarding this assessment.     Thank you for the consult,   Abbey Mccarty       Patient brief summary:  Earl Abdul is a 62 y.o. female initiated on antimicrobial therapy with IV Vancomycin for treatment of suspected bacteremia    Actual Body Weight:   86.2 kg    Renal Function:   Estimated Creatinine Clearance: 62.9 mL/min (based on SCr of 1 mg/dL).    Dialysis Method (if applicable):  N/A

## 2022-09-27 NOTE — ED NOTES
Pt placed on portable telemetry monitoring box # 0038.  Ability to visualize pt's rhythm confirmed with telemetry.

## 2022-09-27 NOTE — ASSESSMENT & PLAN NOTE
62 y.o. F with history of alcohol abuse presents with symptoms of withdrawal (tremulous, agitated) after 3 drinks 9/26 morning. Patient has hx previous hospitalizations for withdrawal. CIWA 21 in ED, given 2mg lorazepam IV.     -valium taper - started on 10mg q6h, wean as tolerated. Patient with serum ethanol 165 and showing signs of withdrawal  -[facility] protocol  -lorazepam 2mg IV PRN for seizures  -seizure precautions  -aspiration precautions

## 2022-09-27 NOTE — PLAN OF CARE
[facility] - Telemetry Stepdown  Initial Discharge Assessment       Primary Care Provider: Andrew Rodriguez MD    Admission Diagnosis: Tachycardia [R00.0]  Chest pain [R07.9]  Sepsis, due to unspecified organism, unspecified whether acute organ dysfunction present [A41.9]    Admission Date: 9/26/2022  Expected Discharge Date: 9/30/2022         Payor: [employer] / Plan: [facility] ALL SAVERS / Product Type: Commercial /     Extended Emergency Contact Information  Primary Emergency Contact: Henrique Abdul  Address: [address] [place] [address] [address] [facility] [place] [address] [facility]  Home Phone: [phone]  Relation: Spouse  Secondary Emergency Contact: Carlos Suazo  Mobile Phone: [phone]  Relation: Son    Discharge Plan A: Home with family  Discharge Plan B: Home Health      [facility] [place] [address] - [place] - [address] [place] [address]  [address] [place] [address]  [place] [address]  Phone: [phone] Fax: [phone]    [id number] - [place] - [address] [place] [address] [place] [address] [facility]  [address] [place] [address]  [place] [address]  Phone: [phone] Fax: [phone]      Initial Assessment (most recent)       Adult Discharge Assessment - 09/27/22 1433          Discharge Assessment    Assessment Type Discharge Planning Assessment     Source of Information health record     Reason For Admission Sepsis     Lives With spouse     Do you have help at home or someone to help you manage your care at home? Yes     Who are your caregiver(s) and their phone number(s)? Henrique Abdul (spouse) [phone]     Prior to hospitilization cognitive status: Alert/Oriented     Current cognitive status: Unable to Assess     Walking or Climbing Stairs Difficulty none     Dressing/Bathing Difficulty none     Equipment Currently Used at Home none     Readmission within 30 days? No     Patient currently being followed by outpatient case management? No     Do you currently have service(s)  that help you manage your care at home? No     Do you take prescription medications? Yes     Do you have prescription coverage? Yes     How do you get to doctors appointments? family or friend will provide     Are you on dialysis? No     Do you take coumadin? No     Discharge Plan A Home with family     Discharge Plan B Home Health     DME Needed Upon Discharge  other (see comments)   TATUM Knight RN  Ext [phone]

## 2022-09-27 NOTE — AI DETERIORATION ALERT
RAPID RESPONSE NURSE AI ALERT       AI alert received.    Chart Reviewed: 09/27/2022, 6:13 PM    MRN: [id number]  Bed: 8085/8085 A    Dx: Alcohol withdrawal    Earl Abdul has a past medical history of Anemia, Anemia, Controlled type 2 diabetes mellitus without complication, without long-term current use of insulin, COPD (chronic obstructive pulmonary disease), Depression, Diverticulitis, Fatty liver, GERD (gastroesophageal reflux disease), Hyperlipidemia, Hypertension, Pancreatitis, Peptic ulcer disease, Polysubstance abuse, Posterior reversible encephalopathy syndrome, Sarcoidosis of lung, Sarcoidosis of lung, Seizures, Suicide attempt, and Suicide ideation.    Last VS: BP (!) 155/72 (BP Location: Right arm, Patient Position: Lying)   Pulse 102   Temp 98.5 °F (36.9 °C) (Oral)   Resp 18   Wt 86.2 kg (190 lb)   SpO2 (!) 93%   Breastfeeding No   BMI 30.67 kg/m²     24H Vital Sign Range:  Temp:  [98.5 °F (36.9 °C)-101 °F (38.3 °C)]   Pulse:  [102-133]   Resp:  [16-18]   BP: (108-166)/(64-80)   SpO2:  [91 %-97 %]     Level of Consciousness (AVPU): alert    Recent Labs     09/26/22  2145   WBC 27.28*   HGB 11.7*   HCT 37.2   *       Recent Labs     09/26/22  2145 09/27/22  1554    132*   K 4.3 3.9   CL 94* 93*   CO2 12* 24   CREATININE 1.0 1.1   * 174*        No results for input(s): PH, PCO2, PO2, HCO3, POCSATURATED, BE in the last 72 hours.     OXYGEN:  Flow (L/min): 3     O2 Device (Oxygen Therapy): nasal cannula    MEWS score: 2    bedside RNNacho  contacted. No concerns verbalized at this time. Instructed to call [phone] for further concerns or assistance.    Aaron Elliott RN

## 2022-09-27 NOTE — ASSESSMENT & PLAN NOTE
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 WBC decreased and patient meeting criteria for MBCL per heme/onc clinic note 08/2022. Not on treatment. Patient with WBC 27 in ED 9/26, ddx includes sepsis vs CLL.

## 2022-09-27 NOTE — PROVIDER PROGRESS NOTES - EMERGENCY DEPT.
Encounter Date: 9/26/2022    ED Physician Progress Notes        Physician Note:   ED Resident HAND-OFF NOTE:  11:30 AM 9/27/2022  Earl Abdul is a 62 y.o. female who presented to the ED on 9/27/2022 and has been managed by Dr. Chase and Dr. Salazar, who reports patient C/O flu like symptoms and hypoxia. I assumed care of patient from off-going ED physician team at 11:30 AM pending CT Head and cervical spine, chest x-ray, and plan for admission.    On my evaluation, Earl Abdul is not in any apparent distress. Thus far, Earl Abdul has received:    Medications  vancomycin 2 g in dextrose 5 % 500 mL IVPB (has no administration in time range)  lactated ringers bolus 1,000 mL (1,000 mLs Intravenous New Bag 9/26/22 2202)   metoclopramide HCl injection 10 mg (10 mg Intravenous Given 9/26/22 2202)  acetaminophen tablet 1,000 mg (1,000 mg Oral Given 9/26/22 2201)  ondansetron injection 4 mg (4 mg Intravenous Given 9/26/22 2158)  lorazepam injection 2 mg (2 mg Intravenous Given 9/26/22 2240)  piperacillin-tazobactam 4.5 g in sodium chloride 0.9% 100 mL IVPB (ready to mix system) (4.5 g Intravenous New Bag 9/26/22 2245)    On my exam, I appreciate:  BP (!) 142/64   Pulse (!) 133   Temp 98.8 °F (37.1 °C) (Oral)   Resp 18   Wt 86.2 kg (190 lb)   SpO2 95%   Breastfeeding No   BMI 30.67 kg/m²     ED Course as of 09/27/22 0015  ------------------------------------------------------------  Time: 09/26 2305  Value: WBC(!): 27.28  Comment: (Reviewed)  By: Glory Chase MD        Additional ED course:  9/26 2322: Chest x-ray is unremarkable.  9/27 0003: CT head is unremarkable.  9/27 0011: CT cervical spine shows mild degenerative changes, otherwise is unremarkable.    Disposition: Patient will be admitted in the setting of her sepsis and alcohol withdrawal.  ______________________  Toan Johnson MD   Emergency Medicine Resident  9/27/2022

## 2022-09-27 NOTE — PROGRESS NOTES
Pharmacist Renal Dose Adjustment Note    Earl Abdul is a 62 y.o. female being treated with the medication cefepime.    Patient Data:    Vital Signs (Most Recent):  Temp: 98.8 °F (37.1 °C) (09/26/22 2101)  Pulse: (!) 124 (09/27/22 0131)  Resp: 18 (09/26/22 2036)  BP: (!) 164/74 (09/27/22 0131)  SpO2: 95 % (09/27/22 0131)   Vital Signs (72h Range):  Temp:  [98.8 °F (37.1 °C)-101 °F (38.3 °C)]   Pulse:  [122-133]   Resp:  [18]   BP: (108-164)/(64-80)   SpO2:  [91 %-97 %]      Recent Labs   Lab 09/26/22 2145   CREATININE 1.0     Serum creatinine: 1 mg/dL 09/26/22 2145  Estimated creatinine clearance: 62.9 mL/min    Cefepime 2 g Q12H will be changed to cefepime 2 g Q8H.    Pharmacist's Name: Abbey Mccarty  Pharmacist's Extension: [phone]

## 2022-09-27 NOTE — HPI
"63 y/o F with a PMHx of CLL/MBCL, sarcoidosis, T2DM, COPD on QHS O2, HLD, HTN and migraines presents to the ED with alcohol withdrawal and flu-like symptoms. Patient reports that she had a bad week and had been drinking, last drink around lunchtime 9/26. She had 3 drinks 9/26 prior to presentation. She is tremulous and reports a headache, but states that it is similar to her migraines. She reports that her  recently had a "bad cold," and she has had similar symptoms over about 1 week including increased cough and SOB, chills, and muscle aches. She states that when she coughs very hard she gags and spits up phlegm. She also reports decreased PO intake.    In the ED, she was given 2mg lorazepam x1 for CIWA 21, tremulous and agitated. Tylenol for T101. CBC remarkable for WBC 27, H/H 11.7/37.2, plt 103. CMP remarkable for CO2 12, anion gap 31, AST 70, ALT 51. Serum alcohol level 165. Lactic acid pending. Patient given 1L LR and 1x vanc/zosyn in ED. Admitted to medicine for mangement of alcohol withdrawal, workup and management of possible sepsis.        "

## 2022-09-27 NOTE — ED TRIAGE NOTES
Earl Abdul, a 62 y.o. female presents to the ED w/ complaint of a migraine 10/10. Pt stated she's an alcoholic, last vodka drink was today. Pt also stated she fell today but doesn't remember, pt was found on the floor by .     Triage note:  Chief Complaint   Patient presents with    Flu-Like Symptoms     84% RA. 97% 3L     Review of patient's allergies indicates:   Allergen Reactions    Lortab [hydrocodone-acetaminophen] Itching    Promethazine Itching and Other (See Comments)     Past Medical History:   Diagnosis Date    Anemia     Anemia     Controlled type 2 diabetes mellitus without complication, without long-term current use of insulin 11/30/2021    COPD (chronic obstructive pulmonary disease)     Depression     Diverticulitis     Fatty liver     GERD (gastroesophageal reflux disease)     Hyperlipidemia     Hypertension     Pancreatitis     Peptic ulcer disease     Polysubstance abuse     Posterior reversible encephalopathy syndrome     Sarcoidosis of lung     Sarcoidosis of lung     over 30 yrs ago    Seizures     7/2017    Suicide attempt     Suicide ideation

## 2022-09-27 NOTE — ASSESSMENT & PLAN NOTE
This patient does not have evidence of infective focus  My overall impression is sepsis. Vital signs were reviewed and noted in progress note.  Antibiotics given-   Antibiotics (From admission, onward)    Start     Stop Route Frequency Ordered    09/28/22 0100  vancomycin 1.25 g in dextrose 5% 250 mL IVPB (ready to mix)         -- IV Every 24 hours (non-standard times) 09/27/22 0238    09/27/22 0400  cefepime in dextrose 5 % IVPB 2 g         -- IV Every 8 hours (non-standard times) 09/27/22 0223    09/27/22 0321  vancomycin - pharmacy to dose  (vancomycin IVPB)        See Hyperspace for full Linked Orders Report.    -- IV pharmacy to manage frequency 09/27/22 0223 09/26/22 2215  vancomycin 2 g in dextrose 5 % 500 mL IVPB         09/27 1014 IV ED 1 Time 09/26/22 2213        Cultures were taken-   Microbiology Results (last 7 days)     Procedure Component Value Units Date/Time    Blood culture x two cultures. Draw prior to antibiotics. [[id number]] Collected: 09/26/22 2145    Order Status: Sent Specimen: Blood from Peripheral, Hand, Left Updated: 09/26/22 2154    Blood culture x two cultures. Draw prior to antibiotics. [[id number]] Collected: 09/26/22 2146    Order Status: Sent Specimen: Blood from Peripheral, Forearm, Right Updated: 09/26/22 2154        Latest lactate reviewed, they are-  No results for input(s): LACTATE in the last 72 hours.    Source- undetermined     Source control Achieved by- vanc/cefepime    Patient with T101, tachycardia to 130s, and WBC 27 in ED. Given 1x vanc/zosyn in ED for broad spectrum coverage, 1L LR fluid resuscitation. CXR with no acute abnormality, UA pending. Patient reports flu-like symptoms with increased cough/SOB, muscle aches, and chills.  -vanc/cefepime for broad spectrum coverage  -1L LR bolus added

## 2022-09-28 PROBLEM — E87.8 REFEEDING SYNDROME: Status: ACTIVE | Noted: 2022-09-28

## 2022-09-28 LAB
ALBUMIN SERPL BCP-MCNC: 3.7 G/DL (ref 3.5–5.2)
ALP SERPL-CCNC: 53 U/L (ref 55–135)
ALT SERPL W/O P-5'-P-CCNC: 49 U/L (ref 10–44)
ANION GAP SERPL CALC-SCNC: 11 MMOL/L (ref 8–16)
ANION GAP SERPL CALC-SCNC: 12 MMOL/L (ref 8–16)
ANION GAP SERPL CALC-SCNC: 12 MMOL/L (ref 8–16)
AST SERPL-CCNC: 59 U/L (ref 10–40)
BACTERIA UR CULT: NORMAL
BASOPHILS # BLD AUTO: 0.01 K/UL (ref 0–0.2)
BASOPHILS NFR BLD: 0.2 % (ref 0–1.9)
BILIRUB SERPL-MCNC: 0.7 MG/DL (ref 0.1–1)
BUN SERPL-MCNC: 4 MG/DL (ref 8–23)
BUN SERPL-MCNC: 6 MG/DL (ref 8–23)
BUN SERPL-MCNC: 7 MG/DL (ref 8–23)
CALCIUM SERPL-MCNC: 8.4 MG/DL (ref 8.7–10.5)
CALCIUM SERPL-MCNC: 8.5 MG/DL (ref 8.7–10.5)
CALCIUM SERPL-MCNC: 8.6 MG/DL (ref 8.7–10.5)
CHLORIDE SERPL-SCNC: 92 MMOL/L (ref 95–110)
CHLORIDE SERPL-SCNC: 94 MMOL/L (ref 95–110)
CHLORIDE SERPL-SCNC: 95 MMOL/L (ref 95–110)
CO2 SERPL-SCNC: 24 MMOL/L (ref 23–29)
CO2 SERPL-SCNC: 26 MMOL/L (ref 23–29)
CO2 SERPL-SCNC: 29 MMOL/L (ref 23–29)
CREAT SERPL-MCNC: 0.8 MG/DL (ref 0.5–1.4)
CREAT SERPL-MCNC: 0.9 MG/DL (ref 0.5–1.4)
CREAT SERPL-MCNC: 0.9 MG/DL (ref 0.5–1.4)
DIFFERENTIAL METHOD: ABNORMAL
EOSINOPHIL # BLD AUTO: 0.1 K/UL (ref 0–0.5)
EOSINOPHIL NFR BLD: 0.8 % (ref 0–8)
ERYTHROCYTE [DISTWIDTH] IN BLOOD BY AUTOMATED COUNT: 16.8 % (ref 11.5–14.5)
EST. GFR  (NO RACE VARIABLE): >60 ML/MIN/1.73 M^2
GLUCOSE SERPL-MCNC: 193 MG/DL (ref 70–110)
GLUCOSE SERPL-MCNC: 210 MG/DL (ref 70–110)
GLUCOSE SERPL-MCNC: 231 MG/DL (ref 70–110)
HCT VFR BLD AUTO: 30.6 % (ref 37–48.5)
HGB BLD-MCNC: 9.9 G/DL (ref 12–16)
IMM GRANULOCYTES # BLD AUTO: 0.03 K/UL (ref 0–0.04)
IMM GRANULOCYTES NFR BLD AUTO: 0.5 % (ref 0–0.5)
LYMPHOCYTES # BLD AUTO: 4 K/UL (ref 1–4.8)
LYMPHOCYTES NFR BLD: 63 % (ref 18–48)
MAGNESIUM SERPL-MCNC: 1.2 MG/DL (ref 1.6–2.6)
MAGNESIUM SERPL-MCNC: 1.8 MG/DL (ref 1.6–2.6)
MCH RBC QN AUTO: 30.3 PG (ref 27–31)
MCHC RBC AUTO-ENTMCNC: 32.4 G/DL (ref 32–36)
MCV RBC AUTO: 94 FL (ref 82–98)
MONOCYTES # BLD AUTO: 0.5 K/UL (ref 0.3–1)
MONOCYTES NFR BLD: 7.7 % (ref 4–15)
NEUTROPHILS # BLD AUTO: 1.8 K/UL (ref 1.8–7.7)
NEUTROPHILS NFR BLD: 27.8 % (ref 38–73)
NRBC BLD-RTO: 0 /100 WBC
PHOSPHATE SERPL-MCNC: 1 MG/DL (ref 2.7–4.5)
PHOSPHATE SERPL-MCNC: <1 MG/DL (ref 2.7–4.5)
PLATELET # BLD AUTO: 56 K/UL (ref 150–450)
PLATELET BLD QL SMEAR: ABNORMAL
PMV BLD AUTO: 10.5 FL (ref 9.2–12.9)
POCT GLUCOSE: 185 MG/DL (ref 70–110)
POCT GLUCOSE: 186 MG/DL (ref 70–110)
POCT GLUCOSE: 200 MG/DL (ref 70–110)
POCT GLUCOSE: 224 MG/DL (ref 70–110)
POCT GLUCOSE: 224 MG/DL (ref 70–110)
POCT GLUCOSE: 243 MG/DL (ref 70–110)
POTASSIUM SERPL-SCNC: 3.3 MMOL/L (ref 3.5–5.1)
POTASSIUM SERPL-SCNC: 3.4 MMOL/L (ref 3.5–5.1)
POTASSIUM SERPL-SCNC: 3.4 MMOL/L (ref 3.5–5.1)
PROT SERPL-MCNC: 6 G/DL (ref 6–8.4)
RBC # BLD AUTO: 3.27 M/UL (ref 4–5.4)
SODIUM SERPL-SCNC: 131 MMOL/L (ref 136–145)
SODIUM SERPL-SCNC: 132 MMOL/L (ref 136–145)
SODIUM SERPL-SCNC: 132 MMOL/L (ref 136–145)
WBC # BLD AUTO: 6.37 K/UL (ref 3.9–12.7)

## 2022-09-28 PROCEDURE — 25000003 PHARM REV CODE 250

## 2022-09-28 PROCEDURE — 25000242 PHARM REV CODE 250 ALT 637 W/ HCPCS

## 2022-09-28 PROCEDURE — 84100 ASSAY OF PHOSPHORUS: CPT | Mod: 91 | Performed by: STUDENT IN AN ORGANIZED HEALTH CARE EDUCATION/TRAINING PROGRAM

## 2022-09-28 PROCEDURE — 36415 COLL VENOUS BLD VENIPUNCTURE: CPT | Performed by: STUDENT IN AN ORGANIZED HEALTH CARE EDUCATION/TRAINING PROGRAM

## 2022-09-28 PROCEDURE — 83735 ASSAY OF MAGNESIUM: CPT | Mod: 91 | Performed by: STUDENT IN AN ORGANIZED HEALTH CARE EDUCATION/TRAINING PROGRAM

## 2022-09-28 PROCEDURE — 83735 ASSAY OF MAGNESIUM: CPT

## 2022-09-28 PROCEDURE — 84100 ASSAY OF PHOSPHORUS: CPT

## 2022-09-28 PROCEDURE — 63600175 PHARM REV CODE 636 W HCPCS: Performed by: INTERNAL MEDICINE

## 2022-09-28 PROCEDURE — 27000221 HC OXYGEN, UP TO 24 HOURS

## 2022-09-28 PROCEDURE — 99233 SBSQ HOSP IP/OBS HIGH 50: CPT | Mod: ,,, | Performed by: INTERNAL MEDICINE

## 2022-09-28 PROCEDURE — 99900035 HC TECH TIME PER 15 MIN (STAT)

## 2022-09-28 PROCEDURE — 36415 COLL VENOUS BLD VENIPUNCTURE: CPT

## 2022-09-28 PROCEDURE — 94640 AIRWAY INHALATION TREATMENT: CPT

## 2022-09-28 PROCEDURE — 63600175 PHARM REV CODE 636 W HCPCS

## 2022-09-28 PROCEDURE — 99233 PR SUBSEQUENT HOSPITAL CARE,LEVL III: ICD-10-PCS | Mod: ,,, | Performed by: INTERNAL MEDICINE

## 2022-09-28 PROCEDURE — 80053 COMPREHEN METABOLIC PANEL: CPT

## 2022-09-28 PROCEDURE — 20600001 HC STEP DOWN PRIVATE ROOM

## 2022-09-28 PROCEDURE — 25000003 PHARM REV CODE 250: Performed by: INTERNAL MEDICINE

## 2022-09-28 PROCEDURE — 85025 COMPLETE CBC W/AUTO DIFF WBC: CPT

## 2022-09-28 PROCEDURE — 25000003 PHARM REV CODE 250: Performed by: STUDENT IN AN ORGANIZED HEALTH CARE EDUCATION/TRAINING PROGRAM

## 2022-09-28 PROCEDURE — 80048 BASIC METABOLIC PNL TOTAL CA: CPT | Mod: 91,XB | Performed by: STUDENT IN AN ORGANIZED HEALTH CARE EDUCATION/TRAINING PROGRAM

## 2022-09-28 PROCEDURE — 94761 N-INVAS EAR/PLS OXIMETRY MLT: CPT

## 2022-09-28 PROCEDURE — 63600175 PHARM REV CODE 636 W HCPCS: Performed by: STUDENT IN AN ORGANIZED HEALTH CARE EDUCATION/TRAINING PROGRAM

## 2022-09-28 RX ORDER — SODIUM,POTASSIUM PHOSPHATES 280-250MG
2 POWDER IN PACKET (EA) ORAL EVERY 4 HOURS
Status: DISCONTINUED | OUTPATIENT
Start: 2022-09-28 | End: 2022-09-29

## 2022-09-28 RX ORDER — MAGNESIUM SULFATE HEPTAHYDRATE 40 MG/ML
2 INJECTION, SOLUTION INTRAVENOUS
Status: COMPLETED | OUTPATIENT
Start: 2022-09-28 | End: 2022-09-28

## 2022-09-28 RX ADMIN — LORAZEPAM 2 MG: 2 INJECTION INTRAMUSCULAR; INTRAVENOUS at 08:09

## 2022-09-28 RX ADMIN — CEFEPIME 2 G: 2 INJECTION, POWDER, FOR SOLUTION INTRAVENOUS at 07:09

## 2022-09-28 RX ADMIN — POTASSIUM BICARBONATE 40 MEQ: 391 TABLET, EFFERVESCENT ORAL at 11:09

## 2022-09-28 RX ADMIN — DULOXETINE 60 MG: 60 CAPSULE, DELAYED RELEASE ORAL at 08:09

## 2022-09-28 RX ADMIN — ONDANSETRON 4 MG: 2 INJECTION INTRAMUSCULAR; INTRAVENOUS at 11:09

## 2022-09-28 RX ADMIN — LORAZEPAM 2 MG: 2 INJECTION INTRAMUSCULAR; INTRAVENOUS at 12:09

## 2022-09-28 RX ADMIN — THIAMINE HYDROCHLORIDE 500 MG: 100 INJECTION, SOLUTION INTRAMUSCULAR; INTRAVENOUS at 08:09

## 2022-09-28 RX ADMIN — GABAPENTIN 600 MG: 300 CAPSULE ORAL at 08:09

## 2022-09-28 RX ADMIN — DIAZEPAM 10 MG: 5 TABLET ORAL at 12:09

## 2022-09-28 RX ADMIN — ARIPIPRAZOLE 5 MG: 5 TABLET ORAL at 08:09

## 2022-09-28 RX ADMIN — MAGNESIUM SULFATE 2 G: 2 INJECTION INTRAVENOUS at 09:09

## 2022-09-28 RX ADMIN — LEVOTHYROXINE SODIUM 50 MCG: 50 TABLET ORAL at 06:09

## 2022-09-28 RX ADMIN — MAGNESIUM SULFATE 2 G: 2 INJECTION INTRAVENOUS at 12:09

## 2022-09-28 RX ADMIN — INSULIN DETEMIR 7 UNITS: 100 INJECTION, SOLUTION SUBCUTANEOUS at 09:09

## 2022-09-28 RX ADMIN — LORAZEPAM 2 MG: 2 INJECTION INTRAMUSCULAR; INTRAVENOUS at 04:09

## 2022-09-28 RX ADMIN — THIAMINE HYDROCHLORIDE 500 MG: 100 INJECTION, SOLUTION INTRAMUSCULAR; INTRAVENOUS at 02:09

## 2022-09-28 RX ADMIN — FOLIC ACID 1 MG: 1 TABLET ORAL at 08:09

## 2022-09-28 RX ADMIN — CEFEPIME 2 G: 2 INJECTION, POWDER, FOR SOLUTION INTRAVENOUS at 03:09

## 2022-09-28 RX ADMIN — ENOXAPARIN SODIUM 40 MG: 100 INJECTION SUBCUTANEOUS at 04:09

## 2022-09-28 RX ADMIN — SALMETEROL XINAFOATE 1 PUFF: 50 POWDER, METERED ORAL; RESPIRATORY (INHALATION) at 10:09

## 2022-09-28 RX ADMIN — POTASSIUM & SODIUM PHOSPHATES POWDER PACK 280-160-250 MG 2 PACKET: 280-160-250 PACK at 11:09

## 2022-09-28 RX ADMIN — VANCOMYCIN HYDROCHLORIDE 1250 MG: 1.25 INJECTION, POWDER, LYOPHILIZED, FOR SOLUTION INTRAVENOUS at 01:09

## 2022-09-28 RX ADMIN — SALMETEROL XINAFOATE 1 PUFF: 50 POWDER, METERED ORAL; RESPIRATORY (INHALATION) at 09:09

## 2022-09-28 RX ADMIN — LORAZEPAM 2 MG: 2 INJECTION INTRAMUSCULAR; INTRAVENOUS at 07:09

## 2022-09-28 RX ADMIN — LORAZEPAM 2 MG: 2 INJECTION INTRAMUSCULAR; INTRAVENOUS at 11:09

## 2022-09-28 RX ADMIN — TIOTROPIUM BROMIDE INHALATION SPRAY 2 PUFF: 3.12 SPRAY, METERED RESPIRATORY (INHALATION) at 09:09

## 2022-09-28 RX ADMIN — DIAZEPAM 10 MG: 5 TABLET ORAL at 06:09

## 2022-09-28 RX ADMIN — SODIUM PHOSPHATE, MONOBASIC, MONOHYDRATE 30 MMOL: 276; 142 INJECTION, SOLUTION INTRAVENOUS at 10:09

## 2022-09-28 RX ADMIN — INSULIN ASPART 2 UNITS: 100 INJECTION, SOLUTION INTRAVENOUS; SUBCUTANEOUS at 11:09

## 2022-09-28 RX ADMIN — DIAZEPAM 10 MG: 5 TABLET ORAL at 04:09

## 2022-09-28 RX ADMIN — POTASSIUM BICARBONATE 40 MEQ: 391 TABLET, EFFERVESCENT ORAL at 02:09

## 2022-09-28 RX ADMIN — DIAZEPAM 10 MG: 5 TABLET ORAL at 11:09

## 2022-09-28 NOTE — ASSESSMENT & PLAN NOTE
Continue home duloxetine and aripiprazole. Patient reports ongoing uncontrolled depression but denies any SI currently. Reports this contributes to her lack of appetite. Defer psych evaluation at this time but will re approach topic with patient closer to [place]

## 2022-09-28 NOTE — CARE UPDATE
RAPID RESPONSE NURSE ROUND       Rounding completed with charge RNStefania for alcohol withdrawal reports no acute distress at this time. No additional concerns verbalized at this time. Instructed to call [phone] for further concerns or assistance.

## 2022-09-28 NOTE — SUBJECTIVE & OBJECTIVE
Interval History: NAOEN. Phos <1 and K and Mg low as well with repeat this afternoon. Patient states concerns stem from various health concerns as well as frequent migraines. Deferred psych evaluation while admitted    Review of Systems   Constitutional:  Positive for chills and fever.   HENT:  Negative for congestion and facial swelling.    Eyes:  Negative for visual disturbance.   Respiratory:  Positive for cough and shortness of breath.    Cardiovascular:  Negative for chest pain and leg swelling.   Gastrointestinal:  Positive for nausea and vomiting. Negative for diarrhea.   Genitourinary:  Negative for difficulty urinating and dysuria.   Musculoskeletal:  Negative for arthralgias.   Skin:  Negative for rash.   Neurological:  Positive for headaches.   Psychiatric/Behavioral:  Positive for agitation.    Objective:     Vital Signs (Most Recent):  Temp: 98.7 °F (37.1 °C) (09/28/22 1602)  Pulse: 104 (09/28/22 1602)  Resp: 19 (09/28/22 1602)  BP: 136/68 (09/28/22 1602)  SpO2: 96 % (09/28/22 1602) Vital Signs (24h Range):  Temp:  [98.1 °F (36.7 °C)-99.2 °F (37.3 °C)] 98.7 °F (37.1 °C)  Pulse:  [] 104  Resp:  [17-20] 19  SpO2:  [93 %-99 %] 96 %  BP: (122-162)/(57-80) 136/68     Weight: 86.2 kg (190 lb)  Body mass index is 30.67 kg/m².    Intake/Output Summary (Last 24 hours) at 9/28/2022 1610  Last data filed at 9/28/2022 0710  Gross per 24 hour   Intake 400 ml   Output --   Net 400 ml      Physical Exam  Constitutional:       General: She is in acute distress.   HENT:      Head: Normocephalic and atraumatic.      Mouth/Throat:      Mouth: Mucous membranes are moist.      Pharynx: Oropharynx is clear.   Eyes:      General: No scleral icterus.     Extraocular Movements: Extraocular movements intact.      Conjunctiva/sclera: Conjunctivae normal.   Cardiovascular:      Rate and Rhythm: Regular rhythm. Tachycardia present.      Heart sounds: Normal heart sounds.   Pulmonary:      Effort: Pulmonary effort is normal.       Breath sounds: Normal breath sounds. No wheezing or rales.      Comments: 3L NC  Abdominal:      General: Abdomen is flat. Bowel sounds are normal.      Palpations: Abdomen is soft.   Musculoskeletal:         General: Normal range of motion.      Cervical back: Normal range of motion.      Right lower leg: No edema.      Left lower leg: No edema.   Skin:     General: Skin is warm and dry.   Neurological:      General: No focal deficit present.      Mental Status: She is alert.      Comments: Agitated, tremulous   Psychiatric:         Mood and Affect: Mood normal.       Significant Labs: All pertinent labs within the past 24 hours have been reviewed.  CBC:   Recent Labs   Lab 09/26/22 2145 09/28/22  0552   WBC 27.28* 6.37   HGB 11.7* 9.9*   HCT 37.2 30.6*   * 56*     CMP:   Recent Labs   Lab 09/26/22 2145 09/27/22  1554 09/27/22  2033 09/28/22  0552 09/28/22  0832      < > 131* 131* 132*   K 4.3   < > 3.7 3.4* 3.3*   CL 94*   < > 93* 95 94*   CO2 12*   < > 24 24 26   *   < > 187* 231* 210*   BUN 20   < > 9 7* 6*   CREATININE 1.0   < > 1.1 0.9 0.9   CALCIUM 9.3   < > 8.9 8.4* 8.6*   PROT 7.6  --   --  6.0  --    ALBUMIN 4.6  --   --  3.7  --    BILITOT 0.8  --   --  0.7  --    ALKPHOS 58  --   --  53*  --    AST 70*  --   --  59*  --    ALT 51*  --   --  49*  --    ANIONGAP 31*   < > 14 12 12    < > = values in this interval not displayed.     Magnesium:   Recent Labs   Lab 09/28/22  0552   MG 1.2*     POCT Glucose:   Recent Labs   Lab 09/28/22  0715 09/28/22  1110 09/28/22  1150   POCTGLUCOSE 186* 224* 243*       Significant Imaging: I have reviewed all pertinent imaging results/findings within the past 24 hours.

## 2022-09-28 NOTE — HOSPITAL COURSE
Patient admitted to  for management of etoh withdrawal, concern for sepsis as well as starvation/etoh acidosis. Patient found to have AG of 31 and Bicarb of 12 with BHB of 7 which resolved with D5 1/2. Patient also given IV thiamine. Concern for refeeding syndrome given phos<1, hypoK and Mag with aggressive repletion. Concern for withdrawal and patient was started on valium taper as well as PRN ativan. Broad spectrum abx were started with no clear sepsis source and deescalated to cefepime, abx now d/c. Patient has initiated discussion about etoh or depression with psychiatry, however attributes symptoms to ongoing migraines and concern for dx of DM2.

## 2022-09-28 NOTE — ASSESSMENT & PLAN NOTE
This patient does not have evidence of infective focus  My overall impression is sepsis. Vital signs were reviewed and noted in progress note.  Antibiotics given-   Antibiotics (From admission, onward)    Start     Stop Route Frequency Ordered    09/27/22 0400  cefepime in dextrose 5 % IVPB 2 g         -- IV Every 8 hours (non-standard times) 09/27/22 0223        Cultures were taken-   Microbiology Results (last 7 days)     Procedure Component Value Units Date/Time    Urine culture [[id number]] Collected: 09/27/22 0430    Order Status: Completed Specimen: Urine Updated: 09/28/22 1454     Urine Culture, Routine No significant growth    Narrative:      Specimen Source->Urine    Blood culture x two cultures. Draw prior to antibiotics. [[id number]] Collected: 09/26/22 2145    Order Status: Completed Specimen: Blood from Peripheral, Hand, Left Updated: 09/27/22 2312     Blood Culture, Routine No Growth to date      No Growth to date    Narrative:      Aerobic and anaerobic    Blood culture x two cultures. Draw prior to antibiotics. [[id number]] Collected: 09/26/22 2146    Order Status: Completed Specimen: Blood from Peripheral, Forearm, Right Updated: 09/27/22 2312     Blood Culture, Routine No Growth to date      No Growth to date    Narrative:      Aerobic and anaerobic    Influenza A & B by Molecular [[id number]] Collected: 09/27/22 0421    Order Status: Completed Specimen: Nasopharyngeal Swab Updated: 09/27/22 0552     Influenza A, Molecular Negative     Influenza B, Molecular Negative     Flu A & B Source Nasal swab        Latest lactate reviewed, they are-  Recent Labs   Lab 09/27/22  0213   LACTATE 2.2       Source- undetermined     Source control Achieved by- vanc/cefepime    Patient with T101, tachycardia to 130s, and WBC 27 in ED. Given 1x vanc/zosyn in ED for broad spectrum coverage, 1L LR fluid resuscitation. CXR with no acute abnormality, UA pending. Patient reports flu-like symptoms with increased cough/SOB,  muscle aches, and chills.  -vanc/cefepime for broad spectrum coverage  -1L LR bolus added  - Deescalate to cefepime given negative cultures and will consider further if NG

## 2022-09-28 NOTE — ASSESSMENT & PLAN NOTE
Phos <1, Mg 1.3 and K low as well concerning for refeeding in setting of D5 given for starvation ketosis. Patient reports not eating for some time prior to admission.     - BID lytes while admitted  - Telemetry

## 2022-09-28 NOTE — ASSESSMENT & PLAN NOTE
Patient's FSGs are controlled on current medication regimen.  Last A1c reviewed-   Lab Results   Component Value Date    HGBA1C 5.1 09/27/2022     Most recent fingerstick glucose reviewed-   Recent Labs   Lab 09/28/22  0041 09/28/22  0715 09/28/22  1110 09/28/22  1150   POCTGLUCOSE 224* 186* 224* 243*     Current correctional scale  Low  May adjust anti-hyperglycemic dose as follows-   Antihyperglycemics (From admission, onward)    Start     Stop Route Frequency Ordered    09/28/22 2100  insulin detemir U-100 pen 7 Units         -- SubQ Nightly 09/28/22 1617    09/27/22 0248  insulin aspart U-100 pen 0-5 Units         -- SubQ Before meals & nightly PRN 09/27/22 0149        Hold Oral hypoglycemics while patient is in the hospital.    Detemir 7U  LDSSI

## 2022-09-28 NOTE — PROGRESS NOTES
"[facility] - Tele[facility] Medicine  Progress Note    Patient Name: Earl Abdul  MRN: [id number]  Patient Class: IP- Inpatient   Admission Date: 9/26/2022  Length of Stay: 1 days  Attending Physician: Aspen Church MD  Primary Care Provider: Andrew Rodriguez MD        Subjective:     Principal Problem:Alcohol withdrawal        HPI:  63 y/o F with a PMHx of CLL/MBCL, sarcoidosis, T2DM, COPD on QHS O2, HLD, HTN and migraines presents to the ED with alcohol withdrawal and flu-like symptoms. Patient reports that she had a bad week and had been drinking, last drink around lunchtime 9/26. She had 3 drinks 9/26 prior to presentation. She is tremulous and reports a headache, but states that it is similar to her migraines. She reports that her  recently had a "bad cold," and she has had similar symptoms over about 1 week including increased cough and SOB, chills, and muscle aches. She states that when she coughs very hard she gags and spits up phlegm. She also reports decreased PO intake.    In the ED, she was given 2mg lorazepam x1 for CIWA 21, tremulous and agitated. Tylenol for T101. CBC remarkable for WBC 27, H/H 11.7/37.2, plt 103. CMP remarkable for CO2 12, anion gap 31, AST 70, ALT 51. Serum alcohol level 165. Lactic acid pending. Patient given 1L LR and 1x vanc/zosyn in ED. Admitted to medicine for mangement of alcohol withdrawal, workup and management of possible sepsis.            Overview/Hospital Course:  Patient admitted to  for management of etoh withdrawal, concern for sepsis as well as starvation/etoh acidosis. Patient found to have AG of 31 and Bicarb of 12 with BHB of 7 which resolved with D5 1/2. Patient also given IV thiamine. Concern for refeeding syndrome given phos<1, hypoK and Mag with aggressive repletion. Concern for withdrawal and patient was started on valium taper as well as PRN ativan. Broad spectrum abx were started with no clear sepsis source and no deescalated " to cefepime. Patient has deferred discussion about etoh or depression with psychiatry so far but attributes symptoms to ongoing migraines and concern for dx of DM2.       Interval History: NAOEN. Phos <1 and K and Mg low as well with repeat this afternoon. Patient states concerns stem from various health concerns as well as frequent migraines. Deferred psych evaluation while admitted    Review of Systems   Constitutional:  Positive for chills and fever.   HENT:  Negative for congestion and facial swelling.    Eyes:  Negative for visual disturbance.   Respiratory:  Positive for cough and shortness of breath.    Cardiovascular:  Negative for chest pain and leg swelling.   Gastrointestinal:  Positive for nausea and vomiting. Negative for diarrhea.   Genitourinary:  Negative for difficulty urinating and dysuria.   Musculoskeletal:  Negative for arthralgias.   Skin:  Negative for rash.   Neurological:  Positive for headaches.   Psychiatric/Behavioral:  Positive for agitation.    Objective:     Vital Signs (Most Recent):  Temp: 98.7 °F (37.1 °C) (09/28/22 1602)  Pulse: 104 (09/28/22 1602)  Resp: 19 (09/28/22 1602)  BP: 136/68 (09/28/22 1602)  SpO2: 96 % (09/28/22 1602) Vital Signs (24h Range):  Temp:  [98.1 °F (36.7 °C)-99.2 °F (37.3 °C)] 98.7 °F (37.1 °C)  Pulse:  [] 104  Resp:  [17-20] 19  SpO2:  [93 %-99 %] 96 %  BP: (122-162)/(57-80) 136/68     Weight: 86.2 kg (190 lb)  Body mass index is 30.67 kg/m².    Intake/Output Summary (Last 24 hours) at 9/28/2022 1610  Last data filed at 9/28/2022 0710  Gross per 24 hour   Intake 400 ml   Output --   Net 400 ml      Physical Exam  Constitutional:       General: She is in acute distress.   HENT:      Head: Normocephalic and atraumatic.      Mouth/Throat:      Mouth: Mucous membranes are moist.      Pharynx: Oropharynx is clear.   Eyes:      General: No scleral icterus.     Extraocular Movements: Extraocular movements intact.      Conjunctiva/sclera: Conjunctivae normal.    Cardiovascular:      Rate and Rhythm: Regular rhythm. Tachycardia present.      Heart sounds: Normal heart sounds.   Pulmonary:      Effort: Pulmonary effort is normal.      Breath sounds: Normal breath sounds. No wheezing or rales.      Comments: 3L NC  Abdominal:      General: Abdomen is flat. Bowel sounds are normal.      Palpations: Abdomen is soft.   Musculoskeletal:         General: Normal range of motion.      Cervical back: Normal range of motion.      Right lower leg: No edema.      Left lower leg: No edema.   Skin:     General: Skin is warm and dry.   Neurological:      General: No focal deficit present.      Mental Status: She is alert.      Comments: Agitated, tremulous   Psychiatric:         Mood and Affect: Mood normal.       Significant Labs: All pertinent labs within the past 24 hours have been reviewed.  CBC:   Recent Labs   Lab 09/26/22 2145 09/28/22  0552   WBC 27.28* 6.37   HGB 11.7* 9.9*   HCT 37.2 30.6*   * 56*     CMP:   Recent Labs   Lab 09/26/22 2145 09/27/22  1554 09/27/22  2033 09/28/22  0552 09/28/22  0832      < > 131* 131* 132*   K 4.3   < > 3.7 3.4* 3.3*   CL 94*   < > 93* 95 94*   CO2 12*   < > 24 24 26   *   < > 187* 231* 210*   BUN 20   < > 9 7* 6*   CREATININE 1.0   < > 1.1 0.9 0.9   CALCIUM 9.3   < > 8.9 8.4* 8.6*   PROT 7.6  --   --  6.0  --    ALBUMIN 4.6  --   --  3.7  --    BILITOT 0.8  --   --  0.7  --    ALKPHOS 58  --   --  53*  --    AST 70*  --   --  59*  --    ALT 51*  --   --  49*  --    ANIONGAP 31*   < > 14 12 12    < > = values in this interval not displayed.     Magnesium:   Recent Labs   Lab 09/28/22  0552   MG 1.2*     POCT Glucose:   Recent Labs   Lab 09/28/22  0715 09/28/22  1110 09/28/22  1150   POCTGLUCOSE 186* 224* 243*       Significant Imaging: I have reviewed all pertinent imaging results/findings within the past 24 hours.      Assessment/Plan:      * Alcohol withdrawal  62 y.o. F with history of alcohol abuse presents with symptoms  of withdrawal (tremulous, agitated) after 3 drinks 9/26 morning. Patient has hx previous hospitalizations for withdrawal. CIWA 21 in ED, given 2mg lorazepam IV.     -valium taper - started on 10mg q6h, wean as tolerated. Patient with serum ethanol 165 and showing signs of withdrawal. Will space to Q8 tomorrow   -[facility] protocol  -lorazepam 2mg IV PRN   -seizure precautions  -aspiration precautions      Refeeding syndrome  Phos <1, Mg 1.3 and K low as well concerning for refeeding in setting of D5 given for starvation ketosis. Patient reports not eating for some time prior to admission.     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 WBC decreased and patient meeting criteria for MBCL per heme/onc clinic note 08/2022. Not on treatment. Patient with WBC 27 in ED 9/26, ddx includes sepsis vs CLL.       Chronic hypoxemic respiratory failure  Patient with Hypoxic Respiratory failure which is Chronic.  she is on home oxygen at 3 LPM. Supplemental oxygen was provided and noted-  .   Signs/symptoms of respiratory failure include- increased work of breathing. Contributing diagnoses includes - COPD Labs and images were reviewed. Patient Has not had a recent ABG. Will treat underlying causes and adjust management of respiratory failure as follows-     -continue 3L NC    Type 2 diabetes mellitus with hyperglycemia  Patient's FSGs are controlled on current medication regimen.  Last A1c reviewed-   Lab Results   Component Value Date    HGBA1C 5.1 09/27/2022     Most recent fingerstick glucose reviewed-   Recent Labs   Lab 09/28/22  0041 09/28/22  0715 09/28/22  1110 09/28/22  1150   POCTGLUCOSE 224* 186* 224* 243*     Current correctional scale  Low  May adjust anti-hyperglycemic dose as follows-   Antihyperglycemics (From admission,  onward)    Start     Stop Route Frequency Ordered    09/28/22 2100  insulin detemir U-100 pen 7 Units         -- SubQ Nightly 09/28/22 1617    09/27/22 0248  insulin aspart U-100 pen 0-5 Units         -- SubQ Before meals & nightly PRN 09/27/22 0149        Hold Oral hypoglycemics while patient is in the hospital.    Detemir 7U  LDSSI    Hypothyroidism  Continue home synthroid      COPD (chronic obstructive pulmonary disease)  On 3L NC home O2      Depression with anxiety  Continue home duloxetine and aripiprazole. Patient reports ongoing uncontrolled depression but denies any SI currently. Reports this contributes to her lack of appetite. Defer psych evaluation at this time but will re approach topic with patient closer to [place]      Severe sepsis  This patient does not have evidence of infective focus  My overall impression is sepsis. Vital signs were reviewed and noted in progress note.  Antibiotics given-   Antibiotics (From admission, onward)    Start     Stop Route Frequency Ordered    09/27/22 0400  cefepime in dextrose 5 % IVPB 2 g         -- IV Every 8 hours (non-standard times) 09/27/22 0223        Cultures were taken-   Microbiology Results (last 7 days)     Procedure Component Value Units Date/Time    Urine culture [[id number]] Collected: 09/27/22 0430    Order Status: Completed Specimen: Urine Updated: 09/28/22 1454     Urine Culture, Routine No significant growth    Narrative:      Specimen Source->Urine    Blood culture x two cultures. Draw prior to antibiotics. [[id number]] Collected: 09/26/22 2145    Order Status: Completed Specimen: Blood from Peripheral, Hand, Left Updated: 09/27/22 2312     Blood Culture, Routine No Growth to date      No Growth to date    Narrative:      Aerobic and anaerobic    Blood culture x two cultures. Draw prior to antibiotics. [[id number]] Collected: 09/26/22 2146    Order Status: Completed Specimen: Blood from Peripheral, Forearm, Right Updated: 09/27/22 2312     Blood  Culture, Routine No Growth to date      No Growth to date    Narrative:      Aerobic and anaerobic    Influenza A & B by Molecular [[id number]] Collected: 09/27/22 0421    Order Status: Completed Specimen: Nasopharyngeal Swab Updated: 09/27/22 0552     Influenza A, Molecular Negative     Influenza B, Molecular Negative     Flu A & B Source Nasal swab        Latest lactate reviewed, they are-  Recent Labs   Lab 09/27/22 0213   LACTATE 2.2       Source- undetermined     Source control Achieved by- vanc/cefepime    Patient with T101, tachycardia to 130s, and WBC 27 in ED. Given 1x vanc/zosyn in ED for broad spectrum coverage, 1L LR fluid resuscitation. CXR with no acute abnormality, UA pending. Patient reports flu-like symptoms with increased cough/SOB, muscle aches, and chills.  -vanc/cefepime for broad spectrum coverage  -1L LR bolus added  - Deescalate to cefepime given negative cultures and will consider further if NG      Essential hypertension  Holding home BP meds due to possible sepsis      Alcohol use disorder, severe, dependence  Patient in alcohol withdrawal, long-standing history of alcohol dependence, previous hospitalizations for withdrawal    -high-dose thiamine to prevent wernicke encephalopathy  -management of withdrawal        VTE Risk Mitigation (From admission, onward)         Ordered     enoxaparin injection 40 mg  Daily         09/27/22 0149     IP VTE HIGH RISK PATIENT  Once         09/27/22 0149     Place sequential compression device  Until discontinued         09/27/22 0149                Discharge Planning   BECCA: 9/30/2022     Code Status: Full Code   Is the patient medically ready for discharge?: No    Reason for patient still in hospital (select all that apply): Patient new problem, Patient trending condition and Treatment  Discharge Plan A: Home with family                  Mathew Carbone DO  Department of Hospital Medicine   [facility] - Telemetry Stepdown

## 2022-09-28 NOTE — PROGRESS NOTES
VANCOMYCIN DOSING BY PHARMACY DISCONTINUATION NOTE    Earl Abdul is a 62 y.o. female who had been consulted for vancomycin dosing.    The pharmacy consult for vancomycin dosing has been discontinued.     Vancomycin Dosing by Pharmacy Consult will sign-off. Please reconsult if necessary. Thank you for allowing us to participate in this patient's care.       Eun Bradshaw, Helen, BCPS  [id number]

## 2022-09-28 NOTE — ASSESSMENT & PLAN NOTE
62 y.o. F with history of alcohol abuse presents with symptoms of withdrawal (tremulous, agitated) after 3 drinks 9/26 morning. Patient has hx previous hospitalizations for withdrawal. CIWA 21 in ED, given 2mg lorazepam IV.     -valium taper - started on 10mg q6h, wean as tolerated. Patient with serum ethanol 165 and showing signs of withdrawal. Will space to Q8 tomorrow   -[facility] protocol  -lorazepam 2mg IV PRN   -seizure precautions  -aspiration precautions

## 2022-09-29 LAB
ALBUMIN SERPL BCP-MCNC: 3.6 G/DL (ref 3.5–5.2)
ALP SERPL-CCNC: 50 U/L (ref 55–135)
ALT SERPL W/O P-5'-P-CCNC: 64 U/L (ref 10–44)
ANION GAP SERPL CALC-SCNC: 10 MMOL/L (ref 8–16)
ANION GAP SERPL CALC-SCNC: 11 MMOL/L (ref 8–16)
ANION GAP SERPL CALC-SCNC: 12 MMOL/L (ref 8–16)
AST SERPL-CCNC: 76 U/L (ref 10–40)
BASOPHILS # BLD AUTO: 0.01 K/UL (ref 0–0.2)
BASOPHILS NFR BLD: 0.2 % (ref 0–1.9)
BILIRUB SERPL-MCNC: 0.8 MG/DL (ref 0.1–1)
BUN SERPL-MCNC: 3 MG/DL (ref 8–23)
CALCIUM SERPL-MCNC: 8.5 MG/DL (ref 8.7–10.5)
CALCIUM SERPL-MCNC: 8.6 MG/DL (ref 8.7–10.5)
CALCIUM SERPL-MCNC: 8.9 MG/DL (ref 8.7–10.5)
CHLORIDE SERPL-SCNC: 92 MMOL/L (ref 95–110)
CHLORIDE SERPL-SCNC: 93 MMOL/L (ref 95–110)
CHLORIDE SERPL-SCNC: 93 MMOL/L (ref 95–110)
CO2 SERPL-SCNC: 29 MMOL/L (ref 23–29)
CO2 SERPL-SCNC: 29 MMOL/L (ref 23–29)
CO2 SERPL-SCNC: 30 MMOL/L (ref 23–29)
CREAT SERPL-MCNC: 0.7 MG/DL (ref 0.5–1.4)
CREAT SERPL-MCNC: 0.7 MG/DL (ref 0.5–1.4)
CREAT SERPL-MCNC: 0.8 MG/DL (ref 0.5–1.4)
DIFFERENTIAL METHOD: ABNORMAL
EOSINOPHIL # BLD AUTO: 0.1 K/UL (ref 0–0.5)
EOSINOPHIL NFR BLD: 0.9 % (ref 0–8)
ERYTHROCYTE [DISTWIDTH] IN BLOOD BY AUTOMATED COUNT: 16.6 % (ref 11.5–14.5)
EST. GFR  (NO RACE VARIABLE): >60 ML/MIN/1.73 M^2
GLUCOSE SERPL-MCNC: 103 MG/DL (ref 70–110)
GLUCOSE SERPL-MCNC: 143 MG/DL (ref 70–110)
GLUCOSE SERPL-MCNC: 146 MG/DL (ref 70–110)
HCT VFR BLD AUTO: 30.7 % (ref 37–48.5)
HGB BLD-MCNC: 9.8 G/DL (ref 12–16)
IMM GRANULOCYTES # BLD AUTO: 0.02 K/UL (ref 0–0.04)
IMM GRANULOCYTES NFR BLD AUTO: 0.3 % (ref 0–0.5)
INR PPP: 1.1 (ref 0.8–1.2)
LYMPHOCYTES # BLD AUTO: 3.8 K/UL (ref 1–4.8)
LYMPHOCYTES NFR BLD: 58.4 % (ref 18–48)
MAGNESIUM SERPL-MCNC: 1.6 MG/DL (ref 1.6–2.6)
MCH RBC QN AUTO: 30 PG (ref 27–31)
MCHC RBC AUTO-ENTMCNC: 31.9 G/DL (ref 32–36)
MCV RBC AUTO: 94 FL (ref 82–98)
MONOCYTES # BLD AUTO: 0.6 K/UL (ref 0.3–1)
MONOCYTES NFR BLD: 9.1 % (ref 4–15)
NEUTROPHILS # BLD AUTO: 2.1 K/UL (ref 1.8–7.7)
NEUTROPHILS NFR BLD: 31.1 % (ref 38–73)
NRBC BLD-RTO: 0 /100 WBC
PETH 16:0/18.1 (POPETH): 1092 NG/ML
PETH 16:0/18.2 (PLPETH): 1266 NG/ML
PHOSPHATE SERPL-MCNC: 2.8 MG/DL (ref 2.7–4.5)
PHOSPHATE SERPL-MCNC: <1 MG/DL (ref 2.7–4.5)
PLATELET # BLD AUTO: 52 K/UL (ref 150–450)
PLATELET BLD QL SMEAR: ABNORMAL
PMV BLD AUTO: 11.1 FL (ref 9.2–12.9)
POCT GLUCOSE: 116 MG/DL (ref 70–110)
POCT GLUCOSE: 138 MG/DL (ref 70–110)
POCT GLUCOSE: 147 MG/DL (ref 70–110)
POCT GLUCOSE: 172 MG/DL (ref 70–110)
POTASSIUM SERPL-SCNC: 3.6 MMOL/L (ref 3.5–5.1)
POTASSIUM SERPL-SCNC: 4.1 MMOL/L (ref 3.5–5.1)
POTASSIUM SERPL-SCNC: 4.6 MMOL/L (ref 3.5–5.1)
PROT SERPL-MCNC: 6 G/DL (ref 6–8.4)
PROTHROMBIN TIME: 10.9 SEC (ref 9–12.5)
RBC # BLD AUTO: 3.27 M/UL (ref 4–5.4)
SODIUM SERPL-SCNC: 132 MMOL/L (ref 136–145)
SODIUM SERPL-SCNC: 133 MMOL/L (ref 136–145)
SODIUM SERPL-SCNC: 134 MMOL/L (ref 136–145)
WBC # BLD AUTO: 6.58 K/UL (ref 3.9–12.7)

## 2022-09-29 PROCEDURE — 36415 COLL VENOUS BLD VENIPUNCTURE: CPT | Performed by: STUDENT IN AN ORGANIZED HEALTH CARE EDUCATION/TRAINING PROGRAM

## 2022-09-29 PROCEDURE — 94760 N-INVAS EAR/PLS OXIMETRY 1: CPT

## 2022-09-29 PROCEDURE — 25000003 PHARM REV CODE 250: Performed by: STUDENT IN AN ORGANIZED HEALTH CARE EDUCATION/TRAINING PROGRAM

## 2022-09-29 PROCEDURE — 63600175 PHARM REV CODE 636 W HCPCS

## 2022-09-29 PROCEDURE — 84100 ASSAY OF PHOSPHORUS: CPT | Mod: 91 | Performed by: STUDENT IN AN ORGANIZED HEALTH CARE EDUCATION/TRAINING PROGRAM

## 2022-09-29 PROCEDURE — 80048 BASIC METABOLIC PNL TOTAL CA: CPT | Mod: 91,XB | Performed by: STUDENT IN AN ORGANIZED HEALTH CARE EDUCATION/TRAINING PROGRAM

## 2022-09-29 PROCEDURE — 85025 COMPLETE CBC W/AUTO DIFF WBC: CPT

## 2022-09-29 PROCEDURE — 27000221 HC OXYGEN, UP TO 24 HOURS

## 2022-09-29 PROCEDURE — 80053 COMPREHEN METABOLIC PANEL: CPT

## 2022-09-29 PROCEDURE — 84100 ASSAY OF PHOSPHORUS: CPT

## 2022-09-29 PROCEDURE — 85610 PROTHROMBIN TIME: CPT | Performed by: STUDENT IN AN ORGANIZED HEALTH CARE EDUCATION/TRAINING PROGRAM

## 2022-09-29 PROCEDURE — 20600001 HC STEP DOWN PRIVATE ROOM

## 2022-09-29 PROCEDURE — 99222 PR INITIAL HOSPITAL CARE,LEVL II: ICD-10-PCS | Mod: ,,, | Performed by: PSYCHIATRY & NEUROLOGY

## 2022-09-29 PROCEDURE — 25000003 PHARM REV CODE 250

## 2022-09-29 PROCEDURE — 99233 PR SUBSEQUENT HOSPITAL CARE,LEVL III: ICD-10-PCS | Mod: ,,, | Performed by: STUDENT IN AN ORGANIZED HEALTH CARE EDUCATION/TRAINING PROGRAM

## 2022-09-29 PROCEDURE — 99900035 HC TECH TIME PER 15 MIN (STAT)

## 2022-09-29 PROCEDURE — 99233 SBSQ HOSP IP/OBS HIGH 50: CPT | Mod: ,,, | Performed by: STUDENT IN AN ORGANIZED HEALTH CARE EDUCATION/TRAINING PROGRAM

## 2022-09-29 PROCEDURE — 99222 1ST HOSP IP/OBS MODERATE 55: CPT | Mod: ,,, | Performed by: PSYCHIATRY & NEUROLOGY

## 2022-09-29 PROCEDURE — 63600175 PHARM REV CODE 636 W HCPCS: Performed by: STUDENT IN AN ORGANIZED HEALTH CARE EDUCATION/TRAINING PROGRAM

## 2022-09-29 PROCEDURE — 83735 ASSAY OF MAGNESIUM: CPT

## 2022-09-29 PROCEDURE — 94640 AIRWAY INHALATION TREATMENT: CPT

## 2022-09-29 PROCEDURE — S4991 NICOTINE PATCH NONLEGEND: HCPCS | Performed by: STUDENT IN AN ORGANIZED HEALTH CARE EDUCATION/TRAINING PROGRAM

## 2022-09-29 RX ORDER — LORAZEPAM 2 MG/ML
INJECTION INTRAMUSCULAR
Status: COMPLETED
Start: 2022-09-29 | End: 2022-09-29

## 2022-09-29 RX ORDER — DIAZEPAM 5 MG/1
10 TABLET ORAL EVERY 8 HOURS
Status: DISCONTINUED | OUTPATIENT
Start: 2022-09-29 | End: 2022-09-30

## 2022-09-29 RX ORDER — LORAZEPAM 1 MG/1
2 TABLET ORAL EVERY 4 HOURS PRN
Status: DISCONTINUED | OUTPATIENT
Start: 2022-09-29 | End: 2022-10-02 | Stop reason: HOSPADM

## 2022-09-29 RX ORDER — DIAZEPAM 5 MG/1
10 TABLET ORAL EVERY 8 HOURS
Status: DISCONTINUED | OUTPATIENT
Start: 2022-09-30 | End: 2022-09-29

## 2022-09-29 RX ORDER — IBUPROFEN 200 MG
1 TABLET ORAL DAILY
Status: DISCONTINUED | OUTPATIENT
Start: 2022-09-29 | End: 2022-10-02 | Stop reason: HOSPADM

## 2022-09-29 RX ORDER — LORAZEPAM 2 MG/ML
2 INJECTION INTRAMUSCULAR EVERY 6 HOURS
Status: DISCONTINUED | OUTPATIENT
Start: 2022-09-30 | End: 2022-09-29

## 2022-09-29 RX ORDER — THIAMINE HCL 100 MG
100 TABLET ORAL DAILY
Status: DISCONTINUED | OUTPATIENT
Start: 2022-10-02 | End: 2022-10-02 | Stop reason: HOSPADM

## 2022-09-29 RX ORDER — DIAZEPAM 5 MG/1
10 TABLET ORAL ONCE
Status: COMPLETED | OUTPATIENT
Start: 2022-09-29 | End: 2022-09-29

## 2022-09-29 RX ORDER — LOPERAMIDE HYDROCHLORIDE 2 MG/1
2 CAPSULE ORAL 4 TIMES DAILY PRN
Status: DISCONTINUED | OUTPATIENT
Start: 2022-09-29 | End: 2022-10-02 | Stop reason: HOSPADM

## 2022-09-29 RX ORDER — LORAZEPAM 2 MG/ML
2 INJECTION INTRAMUSCULAR EVERY 4 HOURS PRN
Status: DISCONTINUED | OUTPATIENT
Start: 2022-09-29 | End: 2022-09-29

## 2022-09-29 RX ORDER — MAGNESIUM SULFATE HEPTAHYDRATE 40 MG/ML
2 INJECTION, SOLUTION INTRAVENOUS ONCE
Status: COMPLETED | OUTPATIENT
Start: 2022-09-29 | End: 2022-09-29

## 2022-09-29 RX ORDER — DIAZEPAM 5 MG/1
10 TABLET ORAL EVERY 6 HOURS
Status: DISCONTINUED | OUTPATIENT
Start: 2022-09-30 | End: 2022-09-29

## 2022-09-29 RX ADMIN — LORAZEPAM 2 MG: 2 INJECTION INTRAMUSCULAR; INTRAVENOUS at 03:09

## 2022-09-29 RX ADMIN — DIAZEPAM 10 MG: 5 TABLET ORAL at 05:09

## 2022-09-29 RX ADMIN — LORAZEPAM 2 MG: 2 INJECTION INTRAMUSCULAR; INTRAVENOUS at 12:09

## 2022-09-29 RX ADMIN — INSULIN DETEMIR 7 UNITS: 100 INJECTION, SOLUTION SUBCUTANEOUS at 10:09

## 2022-09-29 RX ADMIN — THIAMINE HYDROCHLORIDE 500 MG: 100 INJECTION, SOLUTION INTRAMUSCULAR; INTRAVENOUS at 11:09

## 2022-09-29 RX ADMIN — SALMETEROL XINAFOATE 1 PUFF: 50 POWDER, METERED ORAL; RESPIRATORY (INHALATION) at 08:09

## 2022-09-29 RX ADMIN — ARIPIPRAZOLE 5 MG: 5 TABLET ORAL at 08:09

## 2022-09-29 RX ADMIN — TIOTROPIUM BROMIDE INHALATION SPRAY 2 PUFF: 3.12 SPRAY, METERED RESPIRATORY (INHALATION) at 09:09

## 2022-09-29 RX ADMIN — DIAZEPAM 10 MG: 5 TABLET ORAL at 12:09

## 2022-09-29 RX ADMIN — DIAZEPAM 10 MG: 5 TABLET ORAL at 11:09

## 2022-09-29 RX ADMIN — DULOXETINE 60 MG: 60 CAPSULE, DELAYED RELEASE ORAL at 08:09

## 2022-09-29 RX ADMIN — NICOTINE 1 PATCH: 21 PATCH TRANSDERMAL at 04:09

## 2022-09-29 RX ADMIN — THIAMINE HYDROCHLORIDE 500 MG: 100 INJECTION, SOLUTION INTRAMUSCULAR; INTRAVENOUS at 08:09

## 2022-09-29 RX ADMIN — LOPERAMIDE HYDROCHLORIDE 2 MG: 2 CAPSULE ORAL at 11:09

## 2022-09-29 RX ADMIN — GABAPENTIN 600 MG: 300 CAPSULE ORAL at 08:09

## 2022-09-29 RX ADMIN — MAGNESIUM SULFATE 2 G: 2 INJECTION INTRAVENOUS at 09:09

## 2022-09-29 RX ADMIN — SODIUM PHOSPHATE, MONOBASIC, MONOHYDRATE 39.99 MMOL: 276; 142 INJECTION, SOLUTION INTRAVENOUS at 09:09

## 2022-09-29 RX ADMIN — THIAMINE HYDROCHLORIDE 500 MG: 100 INJECTION, SOLUTION INTRAMUSCULAR; INTRAVENOUS at 03:09

## 2022-09-29 RX ADMIN — LEVOTHYROXINE SODIUM 50 MCG: 50 TABLET ORAL at 05:09

## 2022-09-29 RX ADMIN — ACETAMINOPHEN 650 MG: 325 TABLET ORAL at 08:09

## 2022-09-29 RX ADMIN — ACETAMINOPHEN 650 MG: 325 TABLET ORAL at 11:09

## 2022-09-29 RX ADMIN — LORAZEPAM 2 MG: 2 INJECTION INTRAMUSCULAR; INTRAVENOUS at 04:09

## 2022-09-29 RX ADMIN — FOLIC ACID 1 MG: 1 TABLET ORAL at 08:09

## 2022-09-29 RX ADMIN — SALMETEROL XINAFOATE 1 PUFF: 50 POWDER, METERED ORAL; RESPIRATORY (INHALATION) at 09:09

## 2022-09-29 RX ADMIN — POTASSIUM & SODIUM PHOSPHATES POWDER PACK 280-160-250 MG 2 PACKET: 280-160-250 PACK at 02:09

## 2022-09-29 RX ADMIN — DIAZEPAM 10 MG: 5 TABLET ORAL at 04:09

## 2022-09-29 RX ADMIN — LORAZEPAM 2 MG: 2 INJECTION INTRAMUSCULAR; INTRAVENOUS at 06:09

## 2022-09-29 RX ADMIN — DIAZEPAM 10 MG: 5 TABLET ORAL at 10:09

## 2022-09-29 RX ADMIN — POTASSIUM BICARBONATE 40 MEQ: 391 TABLET, EFFERVESCENT ORAL at 12:09

## 2022-09-29 RX ADMIN — CEFEPIME 2 G: 2 INJECTION, POWDER, FOR SOLUTION INTRAVENOUS at 03:09

## 2022-09-29 RX ADMIN — LORAZEPAM 2 MG: 1 TABLET ORAL at 11:09

## 2022-09-29 RX ADMIN — POTASSIUM & SODIUM PHOSPHATES POWDER PACK 280-160-250 MG 2 PACKET: 280-160-250 PACK at 05:09

## 2022-09-29 NOTE — ASSESSMENT & PLAN NOTE
This patient does not have evidence of infective focus  My overall impression is sepsis. Vital signs were reviewed and noted in progress note.  Antibiotics given-   Antibiotics (From admission, onward)    None        Cultures were taken-   Microbiology Results (last 7 days)     Procedure Component Value Units Date/Time    Clostridium difficile EIA [[id number]]     Order Status: Canceled Specimen: Stool     Blood culture x two cultures. Draw prior to antibiotics. [[id number]] Collected: 09/26/22 2146    Order Status: Completed Specimen: Blood from Peripheral, Forearm, Right Updated: 09/28/22 2312     Blood Culture, Routine No Growth to date      No Growth to date      No Growth to date    Narrative:      Aerobic and anaerobic    Blood culture x two cultures. Draw prior to antibiotics. [[id number]] Collected: 09/26/22 2145    Order Status: Completed Specimen: Blood from Peripheral, Hand, Left Updated: 09/28/22 2312     Blood Culture, Routine No Growth to date      No Growth to date      No Growth to date    Narrative:      Aerobic and anaerobic    Urine culture [[id number]] Collected: 09/27/22 0430    Order Status: Completed Specimen: Urine Updated: 09/28/22 1454     Urine Culture, Routine No significant growth    Narrative:      Specimen Source->Urine    Influenza A & B by Molecular [[id number]] Collected: 09/27/22 0421    Order Status: Completed Specimen: Nasopharyngeal Swab Updated: 09/27/22 0552     Influenza A, Molecular Negative     Influenza B, Molecular Negative     Flu A & B Source Nasal swab        Latest lactate reviewed, they are-  Recent Labs   Lab 09/27/22  0213   LACTATE 2.2       Source- undetermined     Source control Achieved by- vanc/cefepime    Patient with T101, tachycardia to 130s, and WBC 27 in ED. Given 1x vanc/zosyn in ED for broad spectrum coverage, 1L LR fluid resuscitation. CXR with no acute abnormality, UA pending. Patient reports flu-like symptoms with increased cough/SOB, muscle aches, and  chills.  -vanc/cefepime for broad spectrum coverage  -1L LR bolus added  - Deescalate to cefepime given negative cultures  - 9/29, discontinue all antibiotics as source of infection/fever unclear

## 2022-09-29 NOTE — CONSULTS
"      DEPARTMENT OF PSYCHIATRY  SITE: [facility], [address]    ADDICTION CONSULT INITIAL EVALUATION     9/29/2022 8:53 AM  Earl Abdul  1960  [id number]    DATE OF ADMISSION: 9/26/2022  8:45 PM  LENGTH OF STAY: 2 days    EXAMINING PRACTITIONER: Hemanth Hawthorne    Inpatient consult to Psychiatry  Consult performed by: Hemanth Hawthorne MD  Consult ordered by: Lucille Gonzáles MD      CHIEF COMPLAINT:     Patient Name: Earl Abdul  YOB: 1960  MRN: [id number]    Earl Abdul is a 62 y.o. female who is seen today for an initial psychiatric evaluation by the addiction psychiatry consult service.  Earl Abdul presents with the chief complaint of: problematic substance use/abuse and alcohol and/or drug addiction    CHART REVIEW:     Available documentation has been reviewed, and pertinent elements of the chart have been incorporated into this evaluation where appropriate.    Per Primary Team:  61 y/o F with a PMHx of CLL/MBCL, sarcoidosis, T2DM, COPD on QHS O2, HLD, HTN and migraines presents to the ED with alcohol withdrawal and flu-like symptoms. Patient reports that she had a bad week and had been drinking, last drink around lunchtime 9/26. She had 3 drinks 9/26 prior to presentation. She is tremulous and reports a headache, but states that it is similar to her migraines. She reports that her  recently had a "bad cold," and she has had similar symptoms over about 1 week including increased cough and SOB, chills, and muscle aches. She states that when she coughs very hard she gags and spits up phlegm. She also reports decreased PO intake.     In the ED, she was given 2mg lorazepam x1 for CIWA 21, tremulous and agitated. Tylenol for T101. CBC remarkable for WBC 27, H/H 11.7/37.2, plt 103. CMP remarkable for CO2 12, anion gap 31, AST 70, ALT 51. Serum alcohol level 165. Lactic acid pending. Patient given 1L LR and 1x vanc/zosyn in ED. Admitted to medicine for " mangement of alcohol withdrawal, workup and management of possible sepsis.      PRESENTATION:     HISTORY OF PRESENT ILLNESS:   Pt states that she came to the hospital for severe alcohoil withdrawal and headache. Pt has been drinking liquor daily for a while and wants to stop. Pt did not give a reason as to why. Pt has history of compliacted withdarwals in the past with most often getting hallucinations. Pt had hallucination of a monkey in her room this AM. Pt did seize 5-6 years ago from alcohol withdrawal. Pt feels nauseated and vomitus. Pt also very shaky though does have baseline tremor per  and pt. Discussed and pt's tremors are more severe than what she has at home. Note pt shaky throughout finger to touch. PT's tonuge not shaky on exam. Pt states that overall she feels like she is getting better and she wants to leave the hospital soon. Discussed and pt wants to leave because she thinks she does notneed to be here. Motivational interviewing to stay unsuccessful and pt states she will talk to her primary team. Pt denies any opther substance use. Pt confirms history as outlined below. Pt does endorse worsening depression and anxiety that she recieves trazodone cymbalta and abilfiy from her PCP. Pt does endorse issues with sleep and appetite though states these are starting to improve.  Interview ended when pt needed to go to restroom.    COLLATERAL:   Extended Emergency Contact Information  Primary Emergency Contact: Henrique Abdul  Address: [address] [place] [address] [address] P[address] [facility]  Home Phone: [phone]  Relation: Spouse  Secondary Emergency Contact: Carlos Suazo  Mobile Phone: [phone]  Relation: Son    RELIABILITY, ACCURACY & AGENCY:     The patient is deemed to be a reliable and factually accurate historian.    Inconsistencies between patient reporting, collateral information, and/or chart review are not observed or manifest.    Legal Status:  none    ADDICTION:  "    SUBSTANCE USE:    I[]I Patient denies any substance use history, and none is known.  I[]I Patient unable or unwilling to provide any substance use history.    Nicotine Use: yes  Alcohol Misuse/Abuse: yes  Illicit Drug Use/Misuse/Abuse: yes  Misuse/Abuse of Prescription Medications: no       Current Alcohol Use:   I[]I U    I[]I N    I[]I Rare    I[]I Social    I[]I Exceeds Recommended Health Limits    I[]I Binge Pattern    I[x]I Daily or Near Daily    I[x]I Physiologically Dependent    Average Consumption (e.g. type, quantity, frequency): 1/2-1 bottle of 750ml vodka per day  Last drink: 9/26    Substances Used (Lifetime): marijuana    Tobacco Cessation ("Wants to Quit"):   I[]I N/A  I[]I U  II[][x]II nterested in Quitting    II[][]II Uninterested in Quitting   II[][x]II Making Efforts to Cut Back or Quit    II[][x]II Advised to Quit    II[][x]II Assistance Provided    II[][x]II Encouragement Provided    II[][x]II Motivational Interviewing Provided    II[][]II Resources Provided    II[][]II Referral Made     Meets Criteria for Substance Use Disorder: yes  Advised to Quit/Cut Back: yes  Motivated to Do So: yes    ADDITIONAL FACTORS RELATED TO ADDICTION:    Hx of IVDU: no  Hx of Accidental Overdose: no  Hx of DUI: no  Hx of Complicated Withdrawal (i.e. Seizures and/or Delirium Tremens): yes  Hx of Known/Suspected Substance-Induced Psychiatric Disorder: no  Hx of Detox: yes  Hx of Rehab: yes  Hx of Medication Assisted Treatment: no  Hx of Twelve Step Program (or Equivalent) Involvement: yes  Currently Exhibits Signs of Intoxication: no  Currently Exhibits Signs of Withdrawal: yes  Currently Active in Recovery: no    Substance(s) of Choice: alcohol and cannabuis  Substances Used Currently/Recently: alcohol  Duration of Sobriety/Abstinence: a few weeks  Date of Last Use of Substances: 9/26  View/Acceptance of Twelve Step (or Equivalent) Programs: accepting  Social Support: yes  Spouse/Partner Consumption: yes    I[]I " "N/A  I[]I Y  I[]I N  I[]I U  I[]I A  Awareness of Biomedical Complications: yes     Understands Need for Lifetime Sobriety: yes   I[]I N/A  I[]I Y  I[]I N  I[]I U  I[]I A  Acknowledges/Accepts Addiction:   I[]I N/A  I[]I Y  I[]I N  I[]I U  I[]I A  Cessation of Problematic Alcohol/Drug Use ("Wants to Quit"): Interested in Quitting  I[]I N/A  I[]I Y  I[]I N  I[]I U  I[]I A  Motivation to Pursue Treatment: High      REVIEW OF SYSTEMS:     Medical Review of Systems:    Complete review of systems performed covering Constitutional, Eyes, ENT/Mouth, Cardiovascular, Respiratory, Gastrointestinal, Genitourinary, Musculoskeletal, Skin, Neurologic, Endocrine, Heme/Lymph, and Allergy/Immune.     Complete review of systems was negative with the exception of the following positive symptoms: headache/migraine, nausea, vomitting, diarrhea, goose flesh, hallucinations of a monkey, shaking, weak, dec enety      Psychiatric Review of System: Is patient experiencing any changes from baseline in:    sleep:  yes   appetite: yes   weight: no   energy/anergy: yes  interest/pleasure/anhedonia: no  somatic symptoms: yes  guilty/hopelessness: no  concentration: yes  S.I.B.s/risky behavior: no  SI/SA:  no     anxiety/panic: yes  Agoraphobia:  no  Social phobia:  no  Recurrent nightmares:  yes  hyper startle response:  no  Avoidance: no  Recurrent thoughts:  no  Recurrent behaviors:  no     Irritability: yes  Racing thoughts: no  Impulsive behaviors: yes  Pressured speech:  no     Paranoia:no  Delusions: no  AVH:yes    HISTORY:     Past Psychiatric:  Psychiatric Diagnoses:  anxiety and depressionm  Current Psychiatric Provider (if Applicable): Denies; reports Pain specialist "Dr Aguilera" rx medications?; Per chart review, patient was previously seen in [facility] by Dr. Cortes   Hx of Psychiatric Hospitalization: no  Hx of Outpatient Psychiatric Treatment (psychiatry/psychotherapy): yes  Psychotropic Trials: pristiq cymbalta trazodone yes  Prior " Suicide Attempts: yes  Hx of Suicidal Ideation: yes  Hx of Homicidal Ideation: no  Hx of Self-Injurious Behavior (Non-Suicidal): yes  Hx of Violence: no  Documented Hx of Malingering: no    Trauma:  Hx of Trauma/Neglect/Physical Abuse/Sexual Abuse: no    Family  Family History:  denies    Social:    Grew Up Locally?: yes  Happy Childhood?: yes  Significant Developmental Delay/Disability?: no  GED/High School Dipoloma?: yes  Post High School Education?: no  Currently Employed?: no  On or Applying for Disability?: no  Functions Independently?: yes  Financially Stable?: yes  Domiciled?: yes  Lives Alone?: no  Heterosexual/Cisgender?: yes  Currently in a Romantic Relationship?: yes  Ever ?: yes  Children/Dependents?: yes  Buddhism/Spiritual?: yes   History?: no  Engaged in Hobbies/Recreational Activities?: yes  Access to a Gun?: no    Legal:  Current Legal Issues: no  Past Charges/Convictions: no  Hx of Incarceration: no    Medical:  The patient's past medical history has been reviewed and updated as appropriate within the electronic medical record system.    General Medical History:     Active Ambulatory Problems     Diagnosis Date Noted    Alcohol use disorder, severe, dependence 02/07/2014    Alcohol withdrawal 02/07/2014    Essential hypertension 02/07/2014    Fibromyalgia 02/07/2014    Tobacco abuse 02/07/2014    Cannabis abuse, in remission 09/08/2011    Sarcoidosis 03/31/2011    History of Clostridium difficile colitis 10/15/2014    Diarrhea 10/15/2014    Dehydration 10/15/2014    History of substance abuse 10/15/2014    Severe sepsis     Pyelonephritis due to Escherichia coli 10/16/2014    New onset seizure 07/20/2017    Posterior reversible encephalopathy syndrome 07/22/2017    Depression with anxiety 08/16/2017    Erythema nodosum 08/27/2013    History of sarcoidosis 07/26/2017    Hyperlipidemia 11/30/2012    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    Degenerative joint disease (DJD) of lumbar spine  11/20/2017    Decreased ROM of lumbar spine 11/14/2018    Difficulty walking 09/24/2019    VINICIO (obstructive sleep apnea)     At risk for lymphedema 10/15/2020    Malignant neoplasm of central portion of right breast in female, estrogen receptor positive 11/18/2020    COPD (chronic obstructive pulmonary disease) 04/17/2021    Incontinence of feces 11/30/2021    Low serum vitamin B12 11/30/2021    Anemia 11/30/2021    Urge incontinence of urine 11/30/2021    Hypothyroidism 11/30/2021    Type 2 diabetes mellitus with hyperglycemia 11/30/2021    Obesity (BMI 30.0-34.9) 11/30/2021    Fecal incontinence 12/21/2021    Mixed incontinence 12/21/2021    Pelvic floor tension 12/21/2021    Chronic hypoxemic respiratory failure 01/08/2022    COVID-19 01/08/2022    Hypoxia     Shortness of breath     Alcoholic ketoacidosis 04/29/2022    E coli bacteremia 05/04/2022    Lymphocytosis 06/27/2022    Migraine without aura and with status migrainosus, not intractable 06/29/2022    OAB (overactive bladder) 07/12/2022     Resolved Ambulatory Problems     Diagnosis Date Noted    Anxiety and Depression 02/07/2014    Sarcoidosis of lung 02/07/2014    Acute alcoholic hepatitis 03/31/2011    Alcoholic fatty liver 05/19/2010    Closed dislocation of ankle 01/09/2012    Contusion of back 12/17/2009    Contusion of knee 12/17/2009    Nausea alone 01/09/2011    Nonspecific abnormal results of liver function study 05/19/2010    Other and unspecified alcohol dependence, continuous drinking behavior 05/01/2011    Other and unspecified alcohol dependence, in remission 09/08/2011    Other and unspecified alcohol dependence, unspecified drinking behavior 05/31/2011    Person with feared complaint in whom no diagnosis was made 11/08/2010    Sprain of ankle, unspecified site 01/09/2012    Acute kidney injury (nontraumatic) 10/14/2014    Sepsis 10/14/2014    Elevated liver enzymes 10/16/2014    Hypokalemia 10/16/2014    Nausea 10/16/2014    Blood in stool  10/16/2014    Abdominal pain 10/16/2014    Hypophosphatemia 10/18/2014    Fungal infection of skin 10/18/2014    Hypomagnesemia 10/19/2014    Vomiting 10/25/2014    Clostridium enterocolitis 10/26/2014    Ascites 10/26/2014    Cirrhosis 12/28/2015    Hypotension 09/29/2016    Altered mental status, unspecified     Lethargy 09/30/2016    Viral upper respiratory tract infection 09/30/2016    Abdominal pain 07/20/2017    Intractable nausea and vomiting 07/20/2017    Chest pain 07/26/2017    Nausea vomiting and diarrhea 07/27/2017    Generalized abdominal pain 07/28/2017    Benign essential hypertension 11/30/2012    PUD (peptic ulcer disease) 08/16/2017    RLS (restless legs syndrome) 08/16/2017    Weakness 11/14/2018    Muscle weakness of lower extremity 09/24/2019    Greater trochanteric bursitis 10/10/2019    Suicidal ideation 10/26/2019    Thrombocytopenia 10/26/2019    Substance abuse     Intractable vomiting with nausea 02/10/2020    Hypokalemia 02/10/2020    Transaminitis 02/10/2020    Nausea and Vomiting with Epigastric Pain 04/17/2021    Prolonged QT interval 04/18/2021    Alcoholic gastritis without bleeding 04/19/2021     Past Medical History:   Diagnosis Date    Controlled type 2 diabetes mellitus without complication, without long-term current use of insulin 11/30/2021    Diverticulitis     Fatty liver     GERD (gastroesophageal reflux disease)     Hypertension     Pancreatitis     Peptic ulcer disease     Polysubstance abuse     Seizures     Suicide attempt     Suicide ideation      Neurological:  Hx of Seizure:  yes - from alcohol withdrawal  Hx of Significant Head Trauma (e.g., Loss of Consciousness, Concussion, Coma):  no    MEDICATIONS:   Current Psychotropic Medications:     Abilify 5mg daily  Cymbalta 60mg QHS  Valium taper ongoing while in hospital    Scheduled and PRN Medications:   The electronic chart was reviewed and updated as appropriate.  See Medcard for details.    Current  Facility-Administered Medications:     acetaminophen tablet 650 mg, 650 mg, Oral, Q4H PRN, Denilson Hurd MD    ARIPiprazole tablet 5 mg, 5 mg, Oral, Daily, Denilson Hurd MD, 5 mg at 09/28/22 0820    cefepime in dextrose 5 % IVPB 2 g, 2 g, Intravenous, Q8H, Denilson Hurd MD, Stopped at 09/29/22 0401    dextrose 10% bolus 125 mL, 12.5 g, Intravenous, PRN, Denilson Hurd MD    dextrose 10% bolus 250 mL, 25 g, Intravenous, PRN, Denilson Hurd MD    dextrose 50% injection 12.5 g, 12.5 g, Intravenous, PRN, Jameson Carbone DO    diazePAM tablet 10 mg, 10 mg, Oral, Q6H, Denilson Hurd MD, 10 mg at 09/29/22 0538    DULoxetine DR capsule 60 mg, 60 mg, Oral, QHS, Denilson Hurd MD, 60 mg at 09/28/22 2049    enoxaparin injection 40 mg, 40 mg, Subcutaneous, Daily, Denilson Hurd MD, 40 mg at 09/28/22 1629    folic acid tablet 1 mg, 1 mg, Oral, Daily, Jameson Carbone DO, 1 mg at 09/28/22 0821    gabapentin capsule 600 mg, 600 mg, Oral, BID, Denilson Hurd MD, 600 mg at 09/28/22 2049    glucagon (human recombinant) injection 1 mg, 1 mg, Intramuscular, PRN, Denilson Hurd MD    glucose chewable tablet 16 g, 16 g, Oral, PRN, Denilson Hurd MD    glucose chewable tablet 24 g, 24 g, Oral, PRN, Denilson Hurd MD    insulin aspart U-100 pen 0-5 Units, 0-5 Units, Subcutaneous, QID (AC + HS) PRN, Denilson Hurd MD, 2 Units at 09/28/22 1153    insulin detemir U-100 pen 7 Units, 7 Units, Subcutaneous, QHS, Jameson Carbone DO, 7 Units at 09/28/22 2101    ipratropium inhaler 2 puff, 2 puff, Inhalation, Q6H PRN, Denilson Hurd MD    levothyroxine tablet 50 mcg, 50 mcg, Oral, Before breakfast, Denilson Hurd MD, 50 mcg at 09/29/22 0538    lorazepam injection 2 mg, 2 mg, Intravenous, Q4H PRN, Jameson Carbone DO, 2 mg at 09/29/22 0430    magnesium sulfate 2g in water 50mL IVPB (premix), 2 g, Intravenous, Once, Jameson Carbone DO    melatonin tablet 6 mg, 6 mg, Oral, Nightly PRN, Denilson Hurd MD    naloxone 0.4 mg/mL injection 0.02 mg, 0.02 mg,  Intravenous, PRN, Denilson Hurd MD    ondansetron injection 4 mg, 4 mg, Intravenous, Q8H PRN, Denilson Hurd MD, 4 mg at 09/28/22 1153    salmeteroL diskus inhaler 1 puff, 1 puff, Inhalation, BID, Denilson Hurd MD, 1 puff at 09/28/22 2223    sodium chloride 0.9% flush 10 mL, 10 mL, Intravenous, Q12H PRN, Denilson Hurd MD    sodium phosphate 39.99 mmol in dextrose 5 % 250 mL IVPB, 39.99 mmol, Intravenous, Once, Jameson Carbone DO    thiamine (B-1) 500 mg in dextrose 5 % 100 mL IVPB, 500 mg, Intravenous, TID, Mohammadelaney Crandall, DO, Stopped at 09/28/22 2127    [START ON 10/2/2022] thiamine tablet 100 mg, 100 mg, Oral, Daily, Mohammad Crandall, DO    tiotropium bromide 2.5 mcg/actuation inhaler 2 puff, 2 puff, Inhalation, Daily, Denilson Hurd MD, 2 puff at 09/28/22 0959    Facility-Administered Medications Ordered in Other Encounters:     albuterol sulfate nebulizer solution 2.5 mg, 2.5 mg, Nebulization, Once, Andrew Rodriguez MD    ALLERGIES:     Review of patient's allergies indicates:   Allergen Reactions    Lortab [hydrocodone-acetaminophen] Itching    Promethazine Itching and Other (See Comments)     PRESCRIPTION DRUG MONITORING:     LA/MS  AWARE  Site reviewed - No recent discrepancies or irregularities are noted.    FIREARMS:     Access to Firearms:   See above for screening on access to firearms.  NOTE: patient counseled on gun safety.  NOTE: patient counseled on increased risks associated with gun ownership.    RISK PARAMETERS:     The following risk parameters were assessed during this evaluation:    Suicidal Ideation/Behavior: no    Homicidal Ideation/Behavior: no  Violence: no  Self-Injurious Behavior: no  Minimization of Symptoms Suspected/Evident: no  Exaggeration of Symptoms Suspected/Evident: no    CLINICAL RISK ASSESSMENT:     The patient was noted to be future oriented.    Currently does not meet or exceed the threshold for psychiatric hospitalization, as the patient can be managed safely and  "successfully in a less restrictive level of care or the community.    CLINICAL RISK DETERMINATION:     Current risk is judged to be:  I[x]I Low    I[]I Moderate   I[]I High    The following criteria were met for involuntary psychiatric admission:   Dangerous to Self: no  Dangerous to Others: no  Gravely Disabled: no    EXAMINATION:     Vitals:    09/29/22 0018 09/29/22 0334 09/29/22 0410 09/29/22 0801   BP:  (!) 115/56  130/61   BP Location:  Left arm  Left arm   Patient Position:  Lying  Lying   Pulse: 107 93 95 91   Resp:  16  20   Temp:  98.6 °F (37 °C)  98.7 °F (37.1 °C)   TempSrc:  Oral  Oral   SpO2:  95% (!) 89%    Weight:           MENTAL STATUS EXAMINATION:     MENTAL STATUS EXAMINATION  General Appearance: appropriately dressed, adequately groomed  Behavior: cooperative, appropriate eye contact, under good behavioral control  Movements and Motor Activity: no abnormal involuntary movements noted, no psychomotor agitation or retardation  Gait and Station: intact, normal gait and station, ambulates without assistance  Speech: normal rate/rhythm/volume/tone, conversational, spontaneous, coherent  Language: fluent English, repeats words/phrases, no word-finding difficulties noted  Mood: "okay"  Affect: euthymic, reactive, appropriate  Thought Process: linear, goal-directed, organized, logical  Associations: intact, no loosening of associations  Thought Content: no suicidal ideation, no homicidal ideation, no paranoid ideation, no ideas of reference, no evidence of delusions or psychosis  Perceptions: no auditory or visual hallucinations  Sensorium: alert  Orientation: oriented fully to person, place, time, and situation  Recent and Remote Memory: recent memory intact, remote memory intact, no impairments noted  Attention and Concentration: intact, attentive to conversation, not distractible  Fund of Knowledge: intact, aware of current events, vocabulary appropriate  Insight: intact, demonstrates awareness of " situation  Judgment: intact, behavior is adequate/appropriate to the circumstances      ASSESSMENT:     A diagnostic psychiatric evaluation was performed and responsiveness to treatment was assessed.  The patient demonstrates robust ability/capacity to respond to treatment.    PSYCHOSOCIAL FACTORS  Stressors (Biopsychosocial, Cultural and Environmental): mental health, physical health, substance use/addiction    STRENGTHS AND LIABILITIES   Strength: Patient accepts guidance/feedback.  Strength: Patient is expressive/articulate.  Strength: Patient is intelligent.  Liability: Patient lacks coping skills    INITIAL DIAGNOSES AND PROBLEMS:     Alcohol use disorder, current, severe, with complicated withdrawal  Cannabis use disorder  Depressive disorder, unspecified  General anxiety disorder    PLAN:     IMPRESSION AND RECOMMENDATIONS:     MANAGEMENT PLAN, TREATMENT GOALS, THERAPEUTIC TECHNIQUES/APPROACHES & CLINICAL REASONING    - Withdrawal usually begins within 6-8 hours after an abrupt reduction in alcohol/benzo intake but may not manifest for up to 72 hours; it generally peaks within 10-30 hours and lasts for 3-7 days  - Concern for complicated withdrawal in this pt. Monitor for delirium tremens and seizures. Recommend/agree with benzo taper:  - Continue valium 10mg Q4H  - Frequent assessment with the CIWA-Ar is the standard of care and the hallmark of clinical practice  - Clinical [place] Withdrawal Assessment of Alcohol Scale (CIWA-Ar)  If CIWA is <8 and vital signs are stable, no PRN medication is required. Repeat vital signs q4 and the CIWA q8.  If CIWA is between 8 and 15, give Lorazepam 2 mg PO/IM q4 PRN and complete vital signs q2 and the CIWA q4.  If CIWA is ?15 or DBP ?110 mmHg, give Lorazepam 2 mg PO/IM q1 until patient has a CIWA of <15 or DBP <110 mmHg. CIWA and vital signs checked q1 until patients CIWA is <15 and DBP <110 mmHg  Call MD for for SBP >180, DBP >120, or HR >110 or if patient requires ?6  mg of lorazepam in 3 hours.  - Vitals q4hr; Ativan 2mg PO/IM q4hr PRN, for CIWA >8 or if 2 criteria are met: Systolic BP>160, Diastolic BP>100 or HR>110  - Vitamin supplementation - Thiamine 100 mg daily, Folate 1 mg daily, MVI daily, and Vitamin B12  - If not already ordered: CBC, CMP, liver panel (prior to initiating taper), B12, folate, iron panel, thiamine level.  - Seizure precautions. Seizures generally occur between 12-48 hours after alcohol cessation. Monitor for hypoglycemia, hypocalcemia, and hypomagnesemia as these can predispose to seizure.  - Monitor for AMS, ataxia and nystagmus as these can be signs of Wernicke's Encephalopathy.    Depression and anxiety  - continue cymbalta 60mg QHS  - continue abilify 5mg daily  - continue trazodone 300mg QHS  - note Rx propranolol, and synthroid  - place ambulatory referral to psych    Shared medical decision making with the patient was employed (to the extent possible) when selecting, or considering but not selecting, proposed treatment and management options.    ADDICTION COUNSELING AND MANAGEMENT:     Timely and targeted counseling is an important intervention in the treatment of substance use disorders.  Active and ongoing management is a hallmark of good clinical care.    Addiction counseling and management WAS employed during this encounter.    --The patient was counseled on abstinence from alcohol and substances of abuse (illicit and prescription).  --Harm reduction techniques were discussed, as warranted, to mitigate risk from problematic behaviors.  --Serial alcohol and drug laboratory testing (e.g. PETH, urine toxicology) is recommended to provide accountability, as well as to guide and refine substance abuse treatment moving forward.  --Relapse prevention and motivational interviewing was provided.  --Education was provided on 12 step recovery programs.    PRESCRIPTION DRUG MANAGEMENT:     Prescription drug management WAS performed during the encounter.   "The patient's ability to understand, participate and engage in a conversation surrounding this was deemed to be: robust and fully intact.    Prescription Drug Management entails the following:  --The review, recommendation, or consideration without recommendation of medications during the encounter.  --Discussion (to the extent possible) with the patient and/or other interested parties of the diagnosis, target symptoms, intended outcomes, and possible benefits and risks of medication, as well as alternatives (including no treatment), if not otherwise known or stated prior.  --Discussion (to the extent possible) with the patient and/or other interested parties of possible expectable adverse effects of any proposed individual psychotropic agents, as well as the inherent unpredictability of treatment, if not otherwise known or stated prior.  --Informed consent was sought from the patient (or a guardian if applicable) after a thorough discussion (to the extent possible) of the aforementioned points outlined above.  --The provision of counseling (to the extent possible) to the patient and/or other interested parties on the importance of full compliance with any prescribed medication, if not otherwise known or stated prior.    Information on psychotropic medication can be found at: [employer] [place] [employer]: Information on Mental Health Medications    In cases of emergency, daily coverage provided by Acute/ER Psych MD, NP, or SW, with contact numbers located in [facility] On Call Schedule    Case discussed with staff addiction psychiatrist: MIGUEL ANGEL VELOZ MD    Portions of this note may have been created with voice recognition software. Occasional "wrong-word" or "sound-a-like" substitutions may have occurred due to the inherent limitations of voice recognition software. Please, read the note carefully and recognize, using context, where substitutions have occurred.    Hemanth Hawthorne, PhD, " MD  House Officer - IV  [facility]  Pager: [phone]  09/29/2022    DATA:     DIAGNOSTIC TESTING:     The chart was reviewed for recent diagnostic investigations, and pertinent results are noted below.    Recent Weights/BMI on File:    Wt Readings from Last 5 Encounters:   09/26/22 86.2 kg (190 lb)   09/06/22 86.4 kg (190 lb 7.6 oz)   08/22/22 86.5 kg (190 lb 11.2 oz)   07/12/22 86.2 kg (190 lb)   06/30/22 89.6 kg (197 lb 8.5 oz)       No data recorded    Current body mass index is 30.67 kg/m².      Most Recent BP/HR on File:    BP Readings from Last 1 Encounters:   09/29/22 130/61       Pulse Readings from Last 1 Encounters:   09/29/22 91         Most Recent EKG on File:    Results for orders placed or performed during the hospital encounter of 09/26/22   EKG 12-lead    Collection Time: 09/26/22  9:42 PM    Narrative    Test Reason : R00.0,    Vent. Rate : 131 BPM     Atrial Rate : 131 BPM     P-R Int : 162 ms          QRS Dur : 084 ms      QT Int : 274 ms       P-R-T Axes : 079 066 062 degrees     QTc Int : 404 ms    Sinus tachycardia  Otherwise normal ECG  When compared with ECG of 03-MAY-2022 12:05,  Vent. rate has increased BY  44 BPM  Confirmed by Alejandro Gama MD ([id number]) on 9/27/2022 11:02:29 AM    Referred By: [email or web address]   SELF           Confirmed By:Alejandro Gama MD           PERTINENT LABORATORY RESULTS    Most Recent Electrolytes on File:  (i.e. Na, K, Ca, Phos, Mg, CO2)    Sodium (mmol/L)   Date Value   09/29/2022 132 (L)     Potassium (mmol/L)   Date Value   09/29/2022 4.6     Calcium (mg/dL)   Date Value   09/29/2022 8.5 (L)     Phosphorus (mg/dL)   Date Value   09/29/2022 <1.0 (LL)     Magnesium (mg/dL)   Date Value   09/29/2022 1.6     CO2 (mmol/L)   Date Value   09/29/2022 29         Most Recent Blood Glucose & HgA1c on File:    Glucose (mg/dL)   Date Value   09/29/2022 146 (H)     Hemoglobin A1C (%)   Date Value   09/27/2022 5.1         Most Recent Renal Function Tests on  File:  (i.e. Cr, BUN, GFR, Urine Specific Gravity, Urine Protein)    Creatinine (mg/dL)   Date Value   09/29/2022 0.7     BUN (mg/dL)   Date Value   09/29/2022 3 (L)     eGFR if African American (mL/min/1.73 m^2)   Date Value   06/22/2022 >60.0     eGFR if non African American (mL/min/1.73 m^2)   Date Value   06/22/2022 >60.0     Specific [place], UA (no units)   Date Value   09/27/2022 1.020     Protein, UA (no units)   Date Value   09/27/2022 Trace (A)         Most Recent Liver Function Tests on File:  (i.e. AST, ALT, Total Bili, Ammonia, Albumin, INR)    AST (U/L)   Date Value   09/29/2022 76 (H)     ALT (U/L)   Date Value   09/29/2022 64 (H)     Total Bilirubin (mg/dL)   Date Value   09/29/2022 0.8     Ammonia (umol/L)   Date Value   06/22/2017 59 (H)     Albumin (g/dL)   Date Value   09/29/2022 3.6     INR (no units)   Date Value   09/29/2022 1.1         Most Recent Pancreatic Function Tests on File:  (i.e. Amylase, Lipase)    Amylase (U/L)   Date Value   10/14/2014 19 (L)     Lipase (U/L)   Date Value   09/26/2022 21         Most Recent Blood Counts on File:  (i.e. WBC, ANC, RBC, MCV, Platelets)    WBC (K/uL)   Date Value   09/29/2022 6.58     Gran # (ANC) (K/uL)   Date Value   09/29/2022 2.1     Gran % (%)   Date Value   09/29/2022 31.1 (L)     RBC (M/uL)   Date Value   09/29/2022 3.27 (L)     MCV (fL)   Date Value   09/29/2022 94     Platelets (K/uL)   Date Value   09/29/2022 52 (L)         Most Recent Thyroid/Parathyroid Function Tests on File:  (i.e. TSH, Free T4, Total T4, Free T3, Total T3, PTH)    TSH (uIU/mL)   Date Value   04/29/2022 0.619     Free T4 (ng/dL)   Date Value   04/18/2022 0.90     PTH, Intact (pg/mL)   Date Value   10/15/2014 45         Most Recent Lipid Panel Results on File:  (i.e. Cholesterol, Triglycerides, LDH, HDL)    Cholesterol (mg/dL)   Date Value   03/10/2022 163     Triglycerides (mg/dL)   Date Value   03/10/2022 189 (H)     LDL Cholesterol (mg/dL)   Date Value   03/10/2022  78.2     HDL (mg/dL)   Date Value   03/10/2022 47         Most Recent CPK & Prolactin on File:    CPK (U/L)   Date Value   01/08/2022 64         Most Recent Vitamin Levels on File:  (i.e. Vitamin B12, Folate, Vitamin D)    Vitamin B-12 (pg/mL)   Date Value   01/31/2022 403   01/31/2022 403     Folate (ng/mL)   Date Value   05/03/2022 17.8     Vit D, 25-Hydroxy (ng/mL)   Date Value   10/15/2014 24 (L)          Most Recent Infection Disease Panel on File:  (i.e. Syphilis, Hepatitis C, Hepatitis B, HIV)     Hepatitis B Surface Ag (no units)   Date Value   07/28/2017 Negative     Hep B S Ab (no units)   Date Value   10/27/2014 Positive (A)     Hepatitis C Ab (no units)   Date Value   04/29/2022 Negative     HIV 1/2 Ag/Ab (no units)   Date Value   04/29/2022 Negative         Pregnancy Screenings:    HCG Quant (mIU/ml)   Date Value   05/09/2006 <2.0         Lithium & Valproate Levels on File:    Lithium Level (mmol/L)   Date Value   09/29/2016 <0.1 (L)       No results found for: VALPROATE      Most Recent Carbamazepine & Lamotrigine Levels on File:    No results found for: CBMZ, LAMOTRIGINE      Most Recent Clozapine Level on File:    No results found for: CLOZAPINE, NORCLOZAP, CLOZNORCLOZ      Results on File for Alcohol Screens (Blood, Urine):    Alcohol, Serum   Date Value   09/26/2022 165 mg/dL (H)   04/29/2022 <10 mg/dL   01/08/2022 10 mg/dL (A)   10/26/2019 98 mg/dL (H)   10/25/2019 284 mg/dL (H)   07/26/2017 <10 mg/dL   07/20/2017 <10 mg/dL   09/29/2016 <10 mg/dL   02/17/2015 214 mg/dL (H)   10/14/2014 <10 mg/dL   02/06/2014 64 mg/dL (H)   02/06/2014 237 mg/dL (H)   05/10/2013 258 mg/dl (A)   06/13/2012 234 mg/dl (A)   03/09/2009 170 mg/dl (H)       Alcohol, Urine   Date Value   04/29/2022 100 mg/dL (A)   01/08/2022 175 mg/dL (A)   04/17/2021 24 mg/dL (A)   07/20/2017 <10 mg/dL   09/22/2016 <10 mg/dL   06/27/2016 <10 mg/dL   10/14/2014 <10 mg/dL   02/06/2014 245 mg/dL (A)   05/10/2013 250 mg/dl (A)   05/02/2011  <10 mg/dl   04/27/2011 <10 mg/dl   04/25/2011 <10 mg/dl   04/20/2011 <10 mg/dl   04/15/2011 <10 mg/dl   04/13/2011 <10 mg/dl   04/11/2011 <10 mg/dl   04/08/2011 <10 mg/dl   04/07/2011 <10 mg/dl   04/04/2011 <10 mg/dl   04/01/2011 <10 mg/dl         Results on File for Urine Drug Screens (Benzodiazepines, Barbiturates, Methadone, Opiates, Cocaine, Amphetamines, THC, PCP):    Benzodiazepines (no units)   Date Value   04/29/2022 Negative   01/08/2022 Presumptive Positive (A)   04/17/2021 Negative   10/26/2019 Negative   07/20/2017 Presumptive Positive   09/29/2016 Presumptive Positive   09/22/2016 Negative   06/27/2016 Presumptive Positive   10/14/2014 Presumptive Positive   02/06/2014 Negative   05/10/2013 Negative   05/02/2011 Presumptive Positive (A)   04/27/2011 Presumptive Positive (A)   04/25/2011 Presumptive Positive (A)   04/20/2011 Presumptive Positive (A)   04/15/2011 Presumptive Positive (A)   04/13/2011 Presumptive Positive (A)   04/11/2011 Presumptive Positive (A)   04/08/2011 Presumptive Positive (A)   04/07/2011 Presumptive Positive (A)   04/04/2011 Presumptive Positive (A)   04/01/2011 Presumptive Positive (A)   03/09/2009 Negative     Barbiturate Screen, Ur (no units)   Date Value   04/29/2022 Negative   01/08/2022 Negative   04/17/2021 Negative   10/26/2019 Negative   07/20/2017 Negative   09/29/2016 Negative   09/22/2016 Negative   06/27/2016 Negative   10/14/2014 Negative   02/06/2014 Negative   05/10/2013 Negative   05/02/2011 Negative   04/27/2011 Negative   04/25/2011 Negative   04/20/2011 Negative   04/15/2011 Negative   04/13/2011 Negative   04/11/2011 Negative   04/08/2011 Negative   04/07/2011 Negative   04/04/2011 Negative   04/01/2011 Negative   03/09/2009 Negative     Methadone metabolites (no units)   Date Value   04/29/2022 Negative   01/08/2022 Negative   04/17/2021 Negative   10/26/2019 Negative   07/20/2017 Negative   09/29/2016 Negative   09/22/2016 Negative   06/27/2016 Negative    10/14/2014 Negative   02/06/2014 Negative   05/10/2013 Negative   05/02/2011 Negative   04/27/2011 Negative   04/25/2011 Negative   04/20/2011 Negative   04/15/2011 Negative   04/13/2011 Negative   04/11/2011 Negative   04/08/2011 Negative   04/07/2011 Negative   04/04/2011 Negative   04/01/2011 Negative   03/09/2009 Negative     Opiate Scrn, Ur (no units)   Date Value   04/29/2022 Negative   01/08/2022 Negative   04/17/2021 Negative   10/26/2019 Negative   07/20/2017 Negative   09/29/2016 Presumptive Positive   09/22/2016 Presumptive Positive   06/27/2016 Negative   10/14/2014 Presumptive Positive   02/06/2014 Negative   05/10/2013 Negative   05/02/2011 Negative   04/27/2011 Negative   04/25/2011 Negative   04/20/2011 Negative   04/15/2011 Negative   04/13/2011 Negative   04/11/2011 Negative   04/08/2011 Presumptive Positive (A)   04/07/2011 Negative   04/04/2011 Negative   04/01/2011 Negative   03/09/2009 Negative     Cocaine (Metab.) (no units)   Date Value   04/29/2022 Negative   01/08/2022 Negative   04/17/2021 Negative   10/26/2019 Negative   07/20/2017 Negative   09/29/2016 Negative   09/22/2016 Negative   06/27/2016 Negative   10/14/2014 Negative   02/06/2014 Negative   05/10/2013 Negative   05/02/2011 Negative   04/27/2011 Negative   04/25/2011 Negative   04/20/2011 Negative   04/15/2011 Negative   04/13/2011 Negative   04/11/2011 Negative   04/08/2011 Negative   04/07/2011 Negative   04/04/2011 Negative   04/01/2011 Negative   03/09/2009 Negative     Amphetamine Screen, Ur (no units)   Date Value   04/29/2022 Negative   01/08/2022 Negative   04/17/2021 Negative   10/26/2019 Negative   07/20/2017 Negative   09/29/2016 Negative   09/22/2016 Negative   06/27/2016 Presumptive Positive   10/14/2014 Negative   02/06/2014 Negative   05/10/2013 Negative   05/02/2011 Negative   04/27/2011 Negative   04/25/2011 Negative   04/20/2011 Negative   04/15/2011 Negative   04/13/2011 Negative   04/11/2011 Negative    04/08/2011 Negative   04/07/2011 Negative   04/04/2011 Negative   04/01/2011 Negative   03/09/2009 Negative     THC (no units)   Date Value   04/29/2022 Presumptive Positive (A)   01/08/2022 Presumptive Positive (A)   04/17/2021 Presumptive Positive   10/26/2019 Negative   07/20/2017 Negative   09/29/2016 Negative   09/22/2016 Negative   06/27/2016 Negative   10/14/2014 Negative   02/06/2014 Negative   05/10/2013 Negative   05/02/2011 Negative   04/27/2011 Negative   04/25/2011 Negative   04/20/2011 Negative   04/15/2011 Negative   04/13/2011 Negative   04/11/2011 Negative   04/08/2011 Negative   04/07/2011 Negative   04/04/2011 Negative   04/01/2011 Negative   03/09/2009 Negative     Phencyclidine (no units)   Date Value   04/29/2022 Negative   01/08/2022 Negative   04/17/2021 Negative   10/26/2019 Negative   07/20/2017 Negative   09/29/2016 Negative   09/22/2016 Negative   06/27/2016 Negative   10/14/2014 Negative   02/06/2014 Negative   05/10/2013 Negative   05/02/2011 Negative   04/27/2011 Negative   04/25/2011 Negative   04/20/2011 Negative   04/15/2011 Negative   04/13/2011 Negative   04/11/2011 Negative   04/08/2011 Negative   04/07/2011 Negative   04/04/2011 Negative   04/01/2011 Negative   03/09/2009 Negative         Most Recent Results for Buprenorphine Testing:    No results found for: BUPRENORPH, NORBUPRENOR      Results on File for Phosphatidylethanol (PETH):    No results found for: PETH  No results found for: [id number]  No results found for: [id number]      Results on File for Ethyl Glucuronide (EtG) and Ethyl Sulfate (EtS):    No results found for: THEYLGLUCU, ETHYLSULF      Most Recent Results on File for Alcohol Biomarkers (GGT, CDT):    GGT (U/L)   Date Value   04/01/2011 542 (H)           RELEVANT NEUROLOGIC IMAGING:  (i.e. CT/MRI brain)      CT HEAD WITHOUT CONTRAST    Results for orders placed during the hospital encounter of 09/26/22    CT Head Without Contrast    Narrative  EXAMINATION:  CT  HEAD WITHOUT CONTRAST    CLINICAL HISTORY:  Syncope, recurrent;    TECHNIQUE:  Low dose axial CT images obtained throughout the head without intravenous contrast.    COMPARISON:  06/10/2022    FINDINGS:  Intracranial compartment:    Ventricles and sulci are normal in size for age without evidence of hydrocephalus. No extra-axial blood or fluid collections.    The brain parenchyma appears normal. No parenchymal mass, hemorrhage, edema or major vascular distribution infarct.    Skull/extracranial contents (limited evaluation): No fracture. Mastoid air cells and paranasal sinuses are essentially clear.    Impression  No acute intracranial process.      Electronically signed by: Mynor Roldan  Date:    09/27/2022  Time:    00:00      MRI HEAD WITHOUT CONTRAST    Results for orders placed during the hospital encounter of 06/10/22    MRI Brain Without Contrast    Narrative  EXAMINATION:  MRI BRAIN WITHOUT CONTRAST    CLINICAL HISTORY:  hx of PRES;    TECHNIQUE:  Multiplanar multisequence MR imaging of the brain was performed without intravenous contrast.    COMPARISON:  Head CT 06/10/2022, brain MRI 10/04/2017, 07/21/2017    FINDINGS:  Intracranial Compartment:    Ventricles are normal in size for age without evidence of hydrocephalus.    Ill-defined focus T2-FLAIR signal hyperintensity in the right periatrial white matter.  Few additional scattered punctate foci elsewhere within the supratentorial white matter.  These appear similar to the prior study of 10/04/2017.  No definite new focal lesions.  No diffusion restriction to indicate an acute infarction.  No remote major vascular distribution infarct.  No recent or remote hemorrhage.  No mass effect or midline shift.    No extra-axial blood or fluid collections.    Normal vascular flow voids are preserved.    Skull/Extracranial Contents (limited evaluation):    Bone marrow signal intensity is normal.    Impression  No evidence of acute intracranial  pathology.      Electronically signed by: Miguel Malagon MD  Date:    06/10/2022  Time:    09:45      MRI HEAD WITH AND WITHOUT CONTRAST    No results found for this or any previous visit.

## 2022-09-29 NOTE — PROGRESS NOTES
"[facility] - Tele[facility] Medicine  Progress Note    Patient Name: Earl Abdul  MRN: [id number]  Patient Class: IP- Inpatient   Admission Date: 9/26/2022  Length of Stay: 2 days  Attending Physician: Gallito Crenshaw DO  Primary Care Provider: Andrew Rodriguez MD        Subjective:     Principal Problem:Alcohol withdrawal        HPI:  61 y/o F with a PMHx of CLL/MBCL, sarcoidosis, T2DM, COPD on QHS O2, HLD, HTN and migraines presents to the ED with alcohol withdrawal and flu-like symptoms. Patient reports that she had a bad week and had been drinking, last drink around lunchtime 9/26. She had 3 drinks 9/26 prior to presentation. She is tremulous and reports a headache, but states that it is similar to her migraines. She reports that her  recently had a "bad cold," and she has had similar symptoms over about 1 week including increased cough and SOB, chills, and muscle aches. She states that when she coughs very hard she gags and spits up phlegm. She also reports decreased PO intake.    In the ED, she was given 2mg lorazepam x1 for CIWA 21, tremulous and agitated. Tylenol for T101. CBC remarkable for WBC 27, H/H 11.7/37.2, plt 103. CMP remarkable for CO2 12, anion gap 31, AST 70, ALT 51. Serum alcohol level 165. Lactic acid pending. Patient given 1L LR and 1x vanc/zosyn in ED. Admitted to medicine for mangement of alcohol withdrawal, workup and management of possible sepsis.            Overview/Hospital Course:  Patient admitted to  for management of etoh withdrawal, concern for sepsis as well as starvation/etoh acidosis. Patient found to have AG of 31 and Bicarb of 12 with BHB of 7 which resolved with D5 1/2. Patient also given IV thiamine. Concern for refeeding syndrome given phos<1, hypoK and Mag with aggressive repletion. Concern for withdrawal and patient was started on valium taper as well as PRN ativan. Broad spectrum abx were started with no clear sepsis source and no deescalated " to cefepime. Patient has initiated discussion about etoh or depression with psychiatry, however attributes symptoms to ongoing migraines and concern for dx of DM2.       Interval History: NAOE. Pt received 2x dose of valium and 2X dose of ativan overnight. Phosphate was <1, repleated with IV phosphate. She reports 6-7 black diarrhea. DUNIA was done, no concerns for occult bleeding. She states she is having fevers and chills. Otherwise, pt is afebrile and HDS. Saturating 89% on RA.    Review of Systems   Constitutional:  Positive for chills and fever.   HENT:  Negative for congestion and facial swelling.    Eyes:  Negative for visual disturbance.   Respiratory:  Positive for cough and shortness of breath.    Cardiovascular:  Negative for chest pain and leg swelling.   Gastrointestinal:  Positive for nausea and vomiting. Negative for diarrhea.   Genitourinary:  Negative for difficulty urinating and dysuria.   Musculoskeletal:  Negative for arthralgias.   Skin:  Negative for rash.   Neurological:  Positive for headaches.   Psychiatric/Behavioral:  Positive for agitation.    Objective:     Vital Signs (Most Recent):  Temp: 98.7 °F (37.1 °C) (09/29/22 1128)  Pulse: 93 (09/29/22 1128)  Resp: 17 (09/29/22 1128)  BP: 123/60 (09/29/22 1128)  SpO2: (!) 92 % (09/29/22 1128)   Vital Signs (24h Range):  Temp:  [98.1 °F (36.7 °C)-98.7 °F (37.1 °C)] 98.7 °F (37.1 °C)  Pulse:  [] 93  Resp:  [16-20] 17  SpO2:  [89 %-100 %] 92 %  BP: (115-136)/(56-79) 123/60     Weight: 86.2 kg (190 lb)  Body mass index is 30.67 kg/m².    Intake/Output Summary (Last 24 hours) at 9/29/2022 1346  Last data filed at 9/29/2022 0439  Gross per 24 hour   Intake 420 ml   Output --   Net 420 ml      Physical Exam  Constitutional:       General: She is in acute distress.   HENT:      Head: Normocephalic and atraumatic.      Mouth/Throat:      Mouth: Mucous membranes are moist.      Pharynx: Oropharynx is clear.   Eyes:      General: No scleral icterus.      Extraocular Movements: Extraocular movements intact.      Conjunctiva/sclera: Conjunctivae normal.   Cardiovascular:      Rate and Rhythm: Regular rhythm. Tachycardia present.      Heart sounds: Normal heart sounds.   Pulmonary:      Effort: Pulmonary effort is normal.      Breath sounds: Normal breath sounds. No wheezing or rales.      Comments: 3L NC  Abdominal:      General: Abdomen is flat. Bowel sounds are normal.      Palpations: Abdomen is soft.   Musculoskeletal:         General: Normal range of motion.      Cervical back: Normal range of motion.      Right lower leg: No edema.      Left lower leg: No edema.   Skin:     General: Skin is warm and dry.   Neurological:      General: No focal deficit present.      Mental Status: She is alert.      Comments: Agitated, tremulous   Psychiatric:         Mood and Affect: Mood normal.       Significant Labs: All pertinent labs within the past 24 hours have been reviewed.  BMP:   Recent Labs   Lab 09/29/22  0354 09/29/22  0809   * 143*   * 133*   K 4.6 4.1   CL 93* 92*   CO2 29 29   BUN 3* 3*   CREATININE 0.7 0.8   CALCIUM 8.5* 8.6*   MG 1.6  --      CBC:   Recent Labs   Lab 09/28/22  0552 09/29/22  0355   WBC 6.37 6.58   HGB 9.9* 9.8*   HCT 30.6* 30.7*   PLT 56* 52*     Coagulation:   Recent Labs   Lab 09/29/22  0355   INR 1.1     Magnesium:   Recent Labs   Lab 09/28/22  0552 09/28/22 1953 09/29/22  0354   MG 1.2* 1.8 1.6     Lab Results   Component Value Date    CALCIUM 8.6 (L) 09/29/2022    PHOS <1.0 (LL) 09/29/2022       Significant Imaging: I have reviewed all pertinent imaging results/findings within the past 24 hours.      Assessment/Plan:      * Alcohol withdrawal  62 y.o. F with history of alcohol abuse presents with symptoms of withdrawal (tremulous, agitated) after 3 drinks 9/26 morning. Patient has hx previous hospitalizations for withdrawal. CIWA 21 in ED, given 2mg lorazepam IV.     -valium taper - started on 10mg q6h, now will be on Q8.   Patient with serum ethanol 165 and showing signs of withdrawal.   -[facility] protocol  -lorazepam 2mg IV PRN   -seizure precautions  -aspiration precautions      Refeeding syndrome  Phos <1, Mg 1.3 and K low as well concerning for refeeding in setting of D5 given for starvation ketosis. Patient reports not eating for some time prior to admission.     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 WBC decreased and patient meeting criteria for MBCL per heme/onc clinic note 08/2022. Not on treatment. Patient with WBC 27 in ED 9/26, ddx includes sepsis vs CLL.       Chronic hypoxemic respiratory failure  Patient with Hypoxic Respiratory failure which is Chronic.  she is on home oxygen at 3 LPM. Supplemental oxygen was provided and noted-  .   Signs/symptoms of respiratory failure include- increased work of breathing. Contributing diagnoses includes - COPD Labs and images were reviewed. Patient Has not had a recent ABG. Will treat underlying causes and adjust management of respiratory failure as follows-     -continue 3L NC    Type 2 diabetes mellitus with hyperglycemia  Patient's FSGs are controlled on current medication regimen.  Last A1c reviewed-   Lab Results   Component Value Date    HGBA1C 5.1 09/27/2022     Most recent fingerstick glucose reviewed-   Recent Labs   Lab 09/28/22  0041 09/28/22  0715 09/28/22  1110 09/28/22  1150   POCTGLUCOSE 224* 186* 224* 243*     Current correctional scale  Low  May adjust anti-hyperglycemic dose as follows-   Antihyperglycemics (From admission, onward)    Start     Stop Route Frequency Ordered    09/28/22 2100  insulin detemir U-100 pen 7 Units         -- SubQ Nightly 09/28/22 1617    09/27/22 0248  insulin aspart U-100 pen 0-5 Units         -- SubQ Before meals & nightly PRN 09/27/22 0142         Hold Oral hypoglycemics while patient is in the hospital.    Detemir 7U  LDSSI    Hypothyroidism  Continue home synthroid      COPD (chronic obstructive pulmonary disease)  On 3L NC home O2      Depression with anxiety  Continue home duloxetine and aripiprazole. Patient reports ongoing uncontrolled depression but denies any SI currently. Reports this contributes to her lack of appetite. Defer psych evaluation at this time but will re approach topic with patient closer to DC      Severe sepsis  This patient does not have evidence of infective focus  My overall impression is sepsis. Vital signs were reviewed and noted in progress note.  Antibiotics given-   Antibiotics (From admission, onward)    None        Cultures were taken-   Microbiology Results (last 7 days)     Procedure Component Value Units Date/Time    Clostridium difficile EIA [[id number]]     Order Status: Canceled Specimen: Stool     Blood culture x two cultures. Draw prior to antibiotics. [[id number]] Collected: 09/26/22 2146    Order Status: Completed Specimen: Blood from Peripheral, Forearm, Right Updated: 09/28/22 2312     Blood Culture, Routine No Growth to date      No Growth to date      No Growth to date    Narrative:      Aerobic and anaerobic    Blood culture x two cultures. Draw prior to antibiotics. [[id number]] Collected: 09/26/22 2145    Order Status: Completed Specimen: Blood from Peripheral, Hand, Left Updated: 09/28/22 2312     Blood Culture, Routine No Growth to date      No Growth to date      No Growth to date    Narrative:      Aerobic and anaerobic    Urine culture [[id number]] Collected: 09/27/22 0430    Order Status: Completed Specimen: Urine Updated: 09/28/22 1454     Urine Culture, Routine No significant growth    Narrative:      Specimen Source->Urine    Influenza A & B by Molecular [[id number]] Collected: 09/27/22 0421    Order Status: Completed Specimen: Nasopharyngeal Swab Updated: 09/27/22 0552     Influenza A, Molecular  Negative     Influenza B, Molecular Negative     Flu A & B Source Nasal swab        Latest lactate reviewed, they are-  Recent Labs   Lab 09/27/22  0213   LACTATE 2.2       Source- undetermined     Source control Achieved by- vanc/cefepime    Patient with T101, tachycardia to 130s, and WBC 27 in ED. Given 1x vanc/zosyn in ED for broad spectrum coverage, 1L LR fluid resuscitation. CXR with no acute abnormality, UA pending. Patient reports flu-like symptoms with increased cough/SOB, muscle aches, and chills.  -vanc/cefepime for broad spectrum coverage  -1L LR bolus added  - Deescalate to cefepime given negative cultures  - 9/29, discontinue all antibiotics as source of infection/fever unclear      Essential hypertension  Holding home BP meds due to possible sepsis      Alcohol use disorder, severe, dependence  Patient in alcohol withdrawal, long-standing history of alcohol dependence, previous hospitalizations for withdrawal    -high-dose thiamine to prevent wernicke encephalopathy  -management of withdrawal        VTE Risk Mitigation (From admission, onward)         Ordered     IP VTE HIGH RISK PATIENT  Once         09/27/22 0149     Place sequential compression device  Until discontinued         09/27/22 0149                Discharge Planning   BECCA: 10/1/2022     Code Status: Full Code   Is the patient medically ready for discharge?: No    Reason for patient still in hospital (select all that apply): Patient trending condition  Discharge Plan A: Home with family                  Lucille Gonzáles MD  Department of Hospital Medicine   [facility] - Telemetry Stepdown

## 2022-09-29 NOTE — SUBJECTIVE & OBJECTIVE
Interval History: NAOE. Pt received 2x dose of valium and 2X dose of ativan overnight. Phosphate was <1, repleated with IV phosphate. She reports 6-7 black diarrhea. DUNIA was done, no concerns for occult bleeding. She states she is having fevers and chills. Otherwise, pt is afebrile and HDS. Saturating 89% on RA.    Review of Systems   Constitutional:  Positive for chills and fever.   HENT:  Negative for congestion and facial swelling.    Eyes:  Negative for visual disturbance.   Respiratory:  Positive for cough and shortness of breath.    Cardiovascular:  Negative for chest pain and leg swelling.   Gastrointestinal:  Positive for nausea and vomiting. Negative for diarrhea.   Genitourinary:  Negative for difficulty urinating and dysuria.   Musculoskeletal:  Negative for arthralgias.   Skin:  Negative for rash.   Neurological:  Positive for headaches.   Psychiatric/Behavioral:  Positive for agitation.    Objective:     Vital Signs (Most Recent):  Temp: 98.7 °F (37.1 °C) (09/29/22 1128)  Pulse: 93 (09/29/22 1128)  Resp: 17 (09/29/22 1128)  BP: 123/60 (09/29/22 1128)  SpO2: (!) 92 % (09/29/22 1128)   Vital Signs (24h Range):  Temp:  [98.1 °F (36.7 °C)-98.7 °F (37.1 °C)] 98.7 °F (37.1 °C)  Pulse:  [] 93  Resp:  [16-20] 17  SpO2:  [89 %-100 %] 92 %  BP: (115-136)/(56-79) 123/60     Weight: 86.2 kg (190 lb)  Body mass index is 30.67 kg/m².    Intake/Output Summary (Last 24 hours) at 9/29/2022 1346  Last data filed at 9/29/2022 0439  Gross per 24 hour   Intake 420 ml   Output --   Net 420 ml      Physical Exam  Constitutional:       General: She is in acute distress.   HENT:      Head: Normocephalic and atraumatic.      Mouth/Throat:      Mouth: Mucous membranes are moist.      Pharynx: Oropharynx is clear.   Eyes:      General: No scleral icterus.     Extraocular Movements: Extraocular movements intact.      Conjunctiva/sclera: Conjunctivae normal.   Cardiovascular:      Rate and Rhythm: Regular rhythm. Tachycardia  present.      Heart sounds: Normal heart sounds.   Pulmonary:      Effort: Pulmonary effort is normal.      Breath sounds: Normal breath sounds. No wheezing or rales.      Comments: 3L NC  Abdominal:      General: Abdomen is flat. Bowel sounds are normal.      Palpations: Abdomen is soft.   Musculoskeletal:         General: Normal range of motion.      Cervical back: Normal range of motion.      Right lower leg: No edema.      Left lower leg: No edema.   Skin:     General: Skin is warm and dry.   Neurological:      General: No focal deficit present.      Mental Status: She is alert.      Comments: Agitated, tremulous   Psychiatric:         Mood and Affect: Mood normal.       Significant Labs: All pertinent labs within the past 24 hours have been reviewed.  BMP:   Recent Labs   Lab 09/29/22  0354 09/29/22  0809   * 143*   * 133*   K 4.6 4.1   CL 93* 92*   CO2 29 29   BUN 3* 3*   CREATININE 0.7 0.8   CALCIUM 8.5* 8.6*   MG 1.6  --      CBC:   Recent Labs   Lab 09/28/22  0552 09/29/22  0355   WBC 6.37 6.58   HGB 9.9* 9.8*   HCT 30.6* 30.7*   PLT 56* 52*     Coagulation:   Recent Labs   Lab 09/29/22  0355   INR 1.1     Magnesium:   Recent Labs   Lab 09/28/22  0552 09/28/22 1953 09/29/22  0354   MG 1.2* 1.8 1.6     Lab Results   Component Value Date    CALCIUM 8.6 (L) 09/29/2022    PHOS <1.0 (LL) 09/29/2022       Significant Imaging: I have reviewed all pertinent imaging results/findings within the past 24 hours.

## 2022-09-29 NOTE — ASSESSMENT & PLAN NOTE
62 y.o. F with history of alcohol abuse presents with symptoms of withdrawal (tremulous, agitated) after 3 drinks 9/26 morning. Patient has hx previous hospitalizations for withdrawal. CIWA 21 in ED, given 2mg lorazepam IV.     -valium taper - started on 10mg q6h, now will be on Q8.  Patient with serum ethanol 165 and showing signs of withdrawal.   -[facility] protocol  -lorazepam 2mg IV PRN   -seizure precautions  -aspiration precautions

## 2022-09-30 PROBLEM — C91.10 CLL (CHRONIC LYMPHOCYTIC LEUKEMIA): Status: ACTIVE | Noted: 2022-09-30

## 2022-09-30 PROBLEM — G43.909 MIGRAINE: Status: ACTIVE | Noted: 2022-09-30

## 2022-09-30 LAB
ALBUMIN SERPL BCP-MCNC: 3.6 G/DL (ref 3.5–5.2)
ALP SERPL-CCNC: 53 U/L (ref 55–135)
ALT SERPL W/O P-5'-P-CCNC: 68 U/L (ref 10–44)
ANION GAP SERPL CALC-SCNC: 10 MMOL/L (ref 8–16)
ANION GAP SERPL CALC-SCNC: 12 MMOL/L (ref 8–16)
ANION GAP SERPL CALC-SCNC: 12 MMOL/L (ref 8–16)
ANISOCYTOSIS BLD QL SMEAR: SLIGHT
AST SERPL-CCNC: 71 U/L (ref 10–40)
BASOPHILS # BLD AUTO: 0.06 K/UL (ref 0–0.2)
BASOPHILS NFR BLD: 0.9 % (ref 0–1.9)
BILIRUB SERPL-MCNC: 0.6 MG/DL (ref 0.1–1)
BUN SERPL-MCNC: 3 MG/DL (ref 8–23)
BUN SERPL-MCNC: <3 MG/DL (ref 8–23)
BUN SERPL-MCNC: <3 MG/DL (ref 8–23)
CALCIUM SERPL-MCNC: 8.3 MG/DL (ref 8.7–10.5)
CALCIUM SERPL-MCNC: 8.4 MG/DL (ref 8.7–10.5)
CALCIUM SERPL-MCNC: 8.6 MG/DL (ref 8.7–10.5)
CHLORIDE SERPL-SCNC: 94 MMOL/L (ref 95–110)
CHLORIDE SERPL-SCNC: 96 MMOL/L (ref 95–110)
CHLORIDE SERPL-SCNC: 96 MMOL/L (ref 95–110)
CO2 SERPL-SCNC: 24 MMOL/L (ref 23–29)
CO2 SERPL-SCNC: 26 MMOL/L (ref 23–29)
CO2 SERPL-SCNC: 28 MMOL/L (ref 23–29)
CREAT SERPL-MCNC: 0.7 MG/DL (ref 0.5–1.4)
CREAT SERPL-MCNC: 0.7 MG/DL (ref 0.5–1.4)
CREAT SERPL-MCNC: 0.9 MG/DL (ref 0.5–1.4)
DIFFERENTIAL METHOD: ABNORMAL
EOSINOPHIL # BLD AUTO: 0.1 K/UL (ref 0–0.5)
EOSINOPHIL NFR BLD: 1.2 % (ref 0–8)
ERYTHROCYTE [DISTWIDTH] IN BLOOD BY AUTOMATED COUNT: 17.2 % (ref 11.5–14.5)
EST. GFR  (NO RACE VARIABLE): >60 ML/MIN/1.73 M^2
GLUCOSE SERPL-MCNC: 136 MG/DL (ref 70–110)
GLUCOSE SERPL-MCNC: 142 MG/DL (ref 70–110)
GLUCOSE SERPL-MCNC: 148 MG/DL (ref 70–110)
HCT VFR BLD AUTO: 31.4 % (ref 37–48.5)
HGB BLD-MCNC: 10 G/DL (ref 12–16)
HYPOCHROMIA BLD QL SMEAR: ABNORMAL
IMM GRANULOCYTES # BLD AUTO: 0.11 K/UL (ref 0–0.04)
IMM GRANULOCYTES NFR BLD AUTO: 1.6 % (ref 0–0.5)
LYMPHOCYTES # BLD AUTO: 4 K/UL (ref 1–4.8)
LYMPHOCYTES NFR BLD: 59 % (ref 18–48)
MAGNESIUM SERPL-MCNC: 1.9 MG/DL (ref 1.6–2.6)
MCH RBC QN AUTO: 29.9 PG (ref 27–31)
MCHC RBC AUTO-ENTMCNC: 31.8 G/DL (ref 32–36)
MCV RBC AUTO: 94 FL (ref 82–98)
MONOCYTES # BLD AUTO: 0.6 K/UL (ref 0.3–1)
MONOCYTES NFR BLD: 9.1 % (ref 4–15)
NEUTROPHILS # BLD AUTO: 1.9 K/UL (ref 1.8–7.7)
NEUTROPHILS NFR BLD: 28.2 % (ref 38–73)
NRBC BLD-RTO: 0 /100 WBC
OVALOCYTES BLD QL SMEAR: ABNORMAL
PHOSPHATE SERPL-MCNC: 2.2 MG/DL (ref 2.7–4.5)
PLATELET # BLD AUTO: 41 K/UL (ref 150–450)
PMV BLD AUTO: ABNORMAL FL (ref 9.2–12.9)
POCT GLUCOSE: 104 MG/DL (ref 70–110)
POCT GLUCOSE: 154 MG/DL (ref 70–110)
POCT GLUCOSE: 168 MG/DL (ref 70–110)
POIKILOCYTOSIS BLD QL SMEAR: SLIGHT
POLYCHROMASIA BLD QL SMEAR: ABNORMAL
POTASSIUM SERPL-SCNC: 4.1 MMOL/L (ref 3.5–5.1)
POTASSIUM SERPL-SCNC: 4.2 MMOL/L (ref 3.5–5.1)
POTASSIUM SERPL-SCNC: 4.2 MMOL/L (ref 3.5–5.1)
PROT SERPL-MCNC: 6.1 G/DL (ref 6–8.4)
RBC # BLD AUTO: 3.35 M/UL (ref 4–5.4)
SODIUM SERPL-SCNC: 132 MMOL/L (ref 136–145)
SODIUM SERPL-SCNC: 132 MMOL/L (ref 136–145)
SODIUM SERPL-SCNC: 134 MMOL/L (ref 136–145)
SPHEROCYTES BLD QL SMEAR: ABNORMAL
WBC # BLD AUTO: 6.71 K/UL (ref 3.9–12.7)

## 2022-09-30 PROCEDURE — 25000003 PHARM REV CODE 250

## 2022-09-30 PROCEDURE — 83735 ASSAY OF MAGNESIUM: CPT

## 2022-09-30 PROCEDURE — 25000003 PHARM REV CODE 250: Performed by: STUDENT IN AN ORGANIZED HEALTH CARE EDUCATION/TRAINING PROGRAM

## 2022-09-30 PROCEDURE — 80048 BASIC METABOLIC PNL TOTAL CA: CPT | Mod: 91,XB | Performed by: STUDENT IN AN ORGANIZED HEALTH CARE EDUCATION/TRAINING PROGRAM

## 2022-09-30 PROCEDURE — 36415 COLL VENOUS BLD VENIPUNCTURE: CPT

## 2022-09-30 PROCEDURE — 99233 SBSQ HOSP IP/OBS HIGH 50: CPT | Mod: ,,, | Performed by: PSYCHIATRY & NEUROLOGY

## 2022-09-30 PROCEDURE — 36415 COLL VENOUS BLD VENIPUNCTURE: CPT | Performed by: STUDENT IN AN ORGANIZED HEALTH CARE EDUCATION/TRAINING PROGRAM

## 2022-09-30 PROCEDURE — 63600175 PHARM REV CODE 636 W HCPCS: Performed by: STUDENT IN AN ORGANIZED HEALTH CARE EDUCATION/TRAINING PROGRAM

## 2022-09-30 PROCEDURE — 99233 PR SUBSEQUENT HOSPITAL CARE,LEVL III: ICD-10-PCS | Mod: ,,, | Performed by: STUDENT IN AN ORGANIZED HEALTH CARE EDUCATION/TRAINING PROGRAM

## 2022-09-30 PROCEDURE — 94761 N-INVAS EAR/PLS OXIMETRY MLT: CPT

## 2022-09-30 PROCEDURE — 85025 COMPLETE CBC W/AUTO DIFF WBC: CPT

## 2022-09-30 PROCEDURE — 80053 COMPREHEN METABOLIC PANEL: CPT

## 2022-09-30 PROCEDURE — 94640 AIRWAY INHALATION TREATMENT: CPT

## 2022-09-30 PROCEDURE — 84100 ASSAY OF PHOSPHORUS: CPT | Performed by: STUDENT IN AN ORGANIZED HEALTH CARE EDUCATION/TRAINING PROGRAM

## 2022-09-30 PROCEDURE — 63600175 PHARM REV CODE 636 W HCPCS

## 2022-09-30 PROCEDURE — 20600001 HC STEP DOWN PRIVATE ROOM

## 2022-09-30 PROCEDURE — 99233 SBSQ HOSP IP/OBS HIGH 50: CPT | Mod: ,,, | Performed by: STUDENT IN AN ORGANIZED HEALTH CARE EDUCATION/TRAINING PROGRAM

## 2022-09-30 PROCEDURE — 99233 PR SUBSEQUENT HOSPITAL CARE,LEVL III: ICD-10-PCS | Mod: ,,, | Performed by: PSYCHIATRY & NEUROLOGY

## 2022-09-30 RX ORDER — TRAZODONE HYDROCHLORIDE 100 MG/1
300 TABLET ORAL NIGHTLY
Status: DISCONTINUED | OUTPATIENT
Start: 2022-09-30 | End: 2022-10-02 | Stop reason: HOSPADM

## 2022-09-30 RX ORDER — BUTALBITAL, ACETAMINOPHEN AND CAFFEINE 50; 325; 40 MG/1; MG/1; MG/1
1 TABLET ORAL ONCE
Status: COMPLETED | OUTPATIENT
Start: 2022-09-30 | End: 2022-09-30

## 2022-09-30 RX ORDER — DIAZEPAM 5 MG/1
10 TABLET ORAL 2 TIMES DAILY
Status: DISCONTINUED | OUTPATIENT
Start: 2022-09-30 | End: 2022-10-01

## 2022-09-30 RX ORDER — KETOROLAC TROMETHAMINE 15 MG/ML
30 INJECTION, SOLUTION INTRAMUSCULAR; INTRAVENOUS ONCE AS NEEDED
Status: COMPLETED | OUTPATIENT
Start: 2022-09-30 | End: 2022-09-30

## 2022-09-30 RX ORDER — SODIUM,POTASSIUM PHOSPHATES 280-250MG
2 POWDER IN PACKET (EA) ORAL
Status: COMPLETED | OUTPATIENT
Start: 2022-09-30 | End: 2022-09-30

## 2022-09-30 RX ADMIN — DIAZEPAM 10 MG: 5 TABLET ORAL at 06:09

## 2022-09-30 RX ADMIN — FOLIC ACID 1 MG: 1 TABLET ORAL at 08:09

## 2022-09-30 RX ADMIN — ARIPIPRAZOLE 5 MG: 5 TABLET ORAL at 08:09

## 2022-09-30 RX ADMIN — ACETAMINOPHEN 650 MG: 325 TABLET ORAL at 04:09

## 2022-09-30 RX ADMIN — SODIUM CHLORIDE 500 ML: 0.9 INJECTION, SOLUTION INTRAVENOUS at 08:09

## 2022-09-30 RX ADMIN — ONDANSETRON 4 MG: 2 INJECTION INTRAMUSCULAR; INTRAVENOUS at 12:09

## 2022-09-30 RX ADMIN — POTASSIUM & SODIUM PHOSPHATES POWDER PACK 280-160-250 MG 2 PACKET: 280-160-250 PACK at 12:09

## 2022-09-30 RX ADMIN — KETOROLAC TROMETHAMINE 30 MG: 15 INJECTION, SOLUTION INTRAMUSCULAR; INTRAVENOUS at 08:09

## 2022-09-30 RX ADMIN — DULOXETINE 60 MG: 60 CAPSULE, DELAYED RELEASE ORAL at 08:09

## 2022-09-30 RX ADMIN — GABAPENTIN 600 MG: 300 CAPSULE ORAL at 08:09

## 2022-09-30 RX ADMIN — SALMETEROL XINAFOATE 1 PUFF: 50 POWDER, METERED ORAL; RESPIRATORY (INHALATION) at 09:09

## 2022-09-30 RX ADMIN — DIAZEPAM 10 MG: 5 TABLET ORAL at 05:09

## 2022-09-30 RX ADMIN — SODIUM PHOSPHATE, MONOBASIC, MONOHYDRATE 20.01 MMOL: 276; 142 INJECTION, SOLUTION INTRAVENOUS at 08:09

## 2022-09-30 RX ADMIN — LORAZEPAM 2 MG: 1 TABLET ORAL at 12:09

## 2022-09-30 RX ADMIN — BUTALBITAL, ACETAMINOPHEN, AND CAFFEINE 1 TABLET: 50; 325; 40 TABLET ORAL at 01:09

## 2022-09-30 RX ADMIN — TIOTROPIUM BROMIDE INHALATION SPRAY 2 PUFF: 3.12 SPRAY, METERED RESPIRATORY (INHALATION) at 09:09

## 2022-09-30 RX ADMIN — THIAMINE HYDROCHLORIDE 500 MG: 100 INJECTION, SOLUTION INTRAMUSCULAR; INTRAVENOUS at 08:09

## 2022-09-30 RX ADMIN — INSULIN DETEMIR 7 UNITS: 100 INJECTION, SOLUTION SUBCUTANEOUS at 08:09

## 2022-09-30 RX ADMIN — POTASSIUM & SODIUM PHOSPHATES POWDER PACK 280-160-250 MG 2 PACKET: 280-160-250 PACK at 08:09

## 2022-09-30 RX ADMIN — LORAZEPAM 2 MG: 1 TABLET ORAL at 04:09

## 2022-09-30 RX ADMIN — ONDANSETRON 4 MG: 2 INJECTION INTRAMUSCULAR; INTRAVENOUS at 04:09

## 2022-09-30 RX ADMIN — TRAZODONE HYDROCHLORIDE 300 MG: 100 TABLET ORAL at 08:09

## 2022-09-30 RX ADMIN — THIAMINE HYDROCHLORIDE 500 MG: 100 INJECTION, SOLUTION INTRAMUSCULAR; INTRAVENOUS at 03:09

## 2022-09-30 RX ADMIN — SALMETEROL XINAFOATE 1 PUFF: 50 POWDER, METERED ORAL; RESPIRATORY (INHALATION) at 08:09

## 2022-09-30 RX ADMIN — LEVOTHYROXINE SODIUM 50 MCG: 50 TABLET ORAL at 05:09

## 2022-09-30 RX ADMIN — POTASSIUM & SODIUM PHOSPHATES POWDER PACK 280-160-250 MG 2 PACKET: 280-160-250 PACK at 04:09

## 2022-09-30 NOTE — DISCHARGE INSTRUCTIONS
REFERRAL RECOMMENDATIONS FOR SUBSTANCE ABUSE & MENTAL HEALTH      IN CASE OF SUICIDAL THINKING, call the National Suicide Hotline Number: 988    988 Suicide & Crisis Lifeline: 983 , 0-502-682-SKDH (1660)  https://988CogniTens.HN [employer]       SUBSTANCE ABUSE:     [facility] (formerly known as the ABU)  [x] [phone], Option 2  [x] [address] [facility] 4th [facility], [place] [address]  [x] [email or web address]  [x] The [facility] delivers comprehensive and collaborative treatment for alcohol and substance use disorders.  Excellent program for working professionals or anyone else seeking recovery.  [x] Requires insurance approval prior to starting program, call number above for more information.  [x] Intensive Outpatient Rehabilitation Program - M-F 9am-3pm - daily groups with psychologists and social workers, sessions with MDs 3x per week   [x] Ambulatory detox and dual diagnosis available      SUBOXONE:     NOTE: some Suboxone clinics require their clients to participate in a structured program (such as an IOP) in order to be prescribed Suboxone.  Some clinics have a long waiting list.  Most of these clinics do not accept walk-in clients, so call first to to learn what must be done to get started on Suboxone.    [facility] - [phone] (can do Sublocade)  [address] [facility], [place] [address]    [address]  [phone]    [facility] - [phone] (can do Sublocade)  [address] [place] [address]    [facility]  [address] [place]  [phone]     [facility] (very short waiting list, may accept some walk-in's but call first if possible)  [address], [place] [address]  [phone]; [phone]    [facility]   [address] [place]    [phone]    [facility] (can usually be seen within a week but is cash only  for appointment)  [address] [facility]  [address] [place], [place]  [phone]    [facility])  [address] [facility] [address]  [phone]    [place], [place]:    [facility] - [address] [place] [address] - Tel: [phone]    Alejandro Reta - [address] [place] [address] - Tel: [phone]    Salvador Galan - [address] [place] [address] - Tel: [phone]    Luis Moody - [address] [place] [address] - Tel: [phone]    Juan Luis Ramos - [address] [place] [address] - Tel: [phone]    [place], [place]:     Dr. Luis Pringle and Dr. Eugene Dennis - [address] [place], [place] - Tel: [phone]    Dr. Radha Kirby - [address] [facility] [place], [place] - Tel: [phone]    Dr. Ayush Hull - Tel: [phone]    Dr. Herber Gomez - [facility] - [phone]      METHADONE:     [facility] (the only methadone clinic in the [facility], has two locations)  [x] [facility] - [address] [place] [address], [phone]  [x] [facility] [place] [address], [phone]      12 STEP PROGRAMS (and similar):     Alcoholics Anonymous (local)  [x] [phone]  [x] www.[place].org for schedules for in-person and online meetings  [x] There are AA meetings throughout the day all over [facility]  [x] AA costs nothing to attend; they pass a basket for donations but this is not required    Narcotics Anonymous  [x] [phone]  [x] www.noana.org  [x] There are NA meetings throughout the day all over town  [x] NA costs nothing to attend; they pass a basket for donations but this is not required    Alcoholics Anonymous Online Intergroup (national)  [x] www.aa-intergroup.org  [x] Good resource for large, nation-wide meetings  [x] Can also attend smaller, local meetings in other cities  [x] Countless meetings all day and all night  [x] AA costs nothing to attend; they pass a basket for donations  but this is not required    LOOKING FOR AN ALTERNATIVE TO 12 STEP PROGRAMS - check out:  SMART Recovery: https://www.smartrecovery.org/about-us  [facility] Recovery: https://recoverydharma.org      DETOX UNITS (USUALLY 5-7 DAYS):     [facility]: [address], [facility]  [phone], call first to ensure bed availability    [facility]: [address] [facility]  [phone], Option 1, call first to ensure bed availability    [facility]: [address] , [facility]  [phone] (intake by appointment only)    [facility]: [address], [facility]  [phone]      INTENSIVE OUTPATIENT PROGRAMS:     [facility] (formerly known as the ABU)  [x] [phone], Option 2  [x] [address] [facility] 4th Floor, [facility] [address]  [x] [email or web address]/[email or web address]covery-[facility]  [x] The [facility] delivers comprehensive and collaborative treatment for alcohol and substance use disorders.  Excellent program for working professionals or anyone else seeking recovery.  [x] Requires insurance approval prior to starting program, call number above for more information.  [x] Intensive Outpatient Rehabilitation Program - M-F 9am-3pm - daily groups with psychologists and social workers, sessions with MDs 3x per week   [x] Ambulatory detox and dual diagnosis available    [facility] Intensive [facility]patient Program  [x] [phone]  [x] [address] [facility] (the clinic not [facility])  [x] Call number above for more info and to check insurance requirements    [facility]  [address] [place], [place] [address] [phone]    [facility]:  [address] [facility], [address]?, [place], [place] [address]?, [phone]  [address] [facility]?, [place], [place] [address]?, [phone]    RESIDENTIAL REHABS (USUALLY 28 DAYS):     [facility]: [address] [phone]    [facility]:  [address] [place] [address]  [phone] (intake by appointment only)    [facility] (men only) [address] [place], [phone]    [facility] (Female only) [address] [place], [phone]    [facility]: [address], [place], men's program [phone], women's program [phone]    [facility]: [address] [place], [phone]    [facility]: [address] [place], [place], [phone]    [facility]: Men only, [phone], [address] [place]    [facility]: [address], [place], [place], [phone]    [facility]: [address] [place] [address],  [phone]  New Location: [address] [place] [address] [address], [place] [address], [phone]    [facility]:   ?[address]?[place], [place] [address]?[phone]    [place]: [address] [place] [address], [phone]    [place]: [place], [place], [phone]     [facility]: [place], [place], [phone]    [facility]: [place], [place], [phone]    [facility]: [place], [place], [phone]    [place]: [place], [place], [phone]    [facility]: [address] [facility], [place] [address], [phone]    COMMUNITY ADDICTION CLINICS:     [facility]: [address] [facility], [address] [place], [phone] -or- [address] [place] [place] [address]    [facility] [place]: [address], [place] Jean Baptiste  [phone]     [facility]: Clinics [phone]; Crisis [phone]    [facility]: [address] [place] [address]    [facility]: [address] [place] [address]    [facility]: [address] [place] [address],    [facility]: 2nd Floor [address] [facility] [address]    [facility]: [address], [place] [address]    [place]  [facility], [address], [place] Jean [place]iste [address]    [facility]: [address] [facility], [place] [phone]  (serves youth 16-23 years old)    [facility]: [facility]/St. Rec[place] [phone]    [facility]: [address] [facility], [place] [phone]    [facility]: [address], [place] [phone]    [facility]: [address] [facility], [address], [phone]     [facility]: [address] [facility] [place], [phone], [phone]    RESOURCES IN OTHER [facility]:     [facility]: [address] [place], [phone], [phone]    [facility]: [address] [facility], [address], [phone]    [facility], [address] [place] [address], [phone]    [facility]: [address] [facility], [phone]    [facility], 900 [facility], [phone] ([facility])    [facility], [address] [facility], [phone] ([facility])    [facility], [address] [facility] [place] [place], [address], [place], [phone] ([facility])    [facility], [address] , [place], [phone] ([facility])    [facility] Hotline [phone], [phone]    [facility], 157 [facility], [facility] Center, 232 [facility], [facility], 1809 [facility], [facility], 500 [facility] B., [facility], [address], [place]    [facility], [address], [place] [phone]    [facility]  "[facility], 302 [facility] [phone]    [facility], [address] [facility], [phone]    [facility], 7[address] [facility], [phone]      Luxembourgish SPEAKING (en español):     Información de la reunión de Alcohólicos Anónimos  [facility], 10:00 am  Habla español  Esta reunión está abierta y cualquiera puede asistir.    Thai speaking Alcoholics anonymous meetings:  El "Cameron [place] AA Skype" es un cameron on line de Alcohólicos Anónimos en [facility]. El cameron es mily, gratuito y virtual a través de Skype Audio. El cameron funciona mediante beth llamada grupal de voz, por lo que no se utiliza la videollamada, ni se pueden waqas las imágenes o rostros de los participantes. Hace reji años y medio abrimos el primer Cameron de AA por Skype en [place], amaya actualmente asisten personas desde [place], [place], [place], [place], [place], [place], [place], [place], [place], [place], [place] y [place], entre otros.    El cameron es muy útil para los alcohólicos que residen en lugares donde no se celebran reuniones de AA, o residen en lugares donde las reuniones de AA son un número limitado de días a la semana, o para aquellos compañeros que se hayan de viaje o que, por cualquier motivo, se hayan convalecientes y no pueden desplazarse. Todos los días nos reuniones a las 21:00 (hora española)    Podéis obtener más información sobre el cameron y george sesiones en la página web [email or web address]      MENTAL HEALTH:     [facility]  [phone]    [facility] General Psychiatry Intensive Outpatient Program  [facility] ([facility] known as the [facility])  [phone], option 3    [facility] - Dual Diagnosis Intensive Outpatient Program  [facility] (formerly " known as the [facility])  [phone], option 2      COMMUNITY MENTAL HEALTH CENTERS:     [facility]  (aka [facility], aka [facility])  Serves [facility] and [facility] residents.  Serves uninsured patients & those with Medicaid.  Main location: [address] [place], [place] [address] [phone]  Walk-in's available during regular business hours.  24/7 Crisis Line: [phone]    [facility]  (aka [facility], [facility])  Serves [facility].  Serves uninsured patients, those with Medicaid and some private plans.  Walk-in's available during regular business hours.  Primary care services available as well.  [facility]: [address] [facility] [place] [address];  [phone]  [place]: [address] [place] [address];  [phone]  24/7 Crisis Line: [phone]    [facility]  Serves uninsured patients & those with Medicaid, call for more info.  Primary care, pediatrics, HIV treatment, and dentistry services available as well.  Three locations.  [phone]    [facility] [employer] [place]?Corporate Office  Serves patients with Medicaid, Medicare, and private insurance  [address]  [place],?[place] [address] [phone]    [facility]  Serves uninsured on a sliding scale, as well as Medicaid, Medicare, and private plans.  Eight locations around the [facility] area.  [phone]    [facility]  Serves uninsured patients & those with Medicaid, private insurances.  Primary care available as well.  [phone]  [facility] [place] [address]    [facility]  Serves veterans who were honorably discharged.  [address] [place] [address]  [phone]  24/7 Veterans Crisis Line: [phone] (Press 1)    If you have private insurance and need to find a  specialist, please contact your insurance network to request a list of providers covered by your benefits.      MENTAL HEALTH/ADDICTIVE DISORDERS:     AA ([phone]), NA ([phone])   National Suicide Prevention Lifeline- Call 1-823.189.4443 Available 24 hours everyday  [facility] 113-7000; Crisis Line 843-7295 - Call for options A-F:  Intensive Outpatient Treatment/ Day programs   [facility], please contact   [facility], please contact [phone] or [phone] to speak with an admissions counselor.  [facility] (Methadone Maintenance)   [address] [place] [address], [phone]  [address], [place] [address] [phone]  [facility], [address] [address], [phone]  [facility] Addiction Treatment [facility] [phone]  [facility] please contact [phone]  [facility], [address] [facility], 4th [place] [address] Phone: [phone]   [facility]cer  [place] Office: [address] [address], [phone]  [place] Office: [address] [place] [address] B, [place] [address], [phone]  [place] Office: 2611 [facility] [place] [address] [phone]    Outpatient Substance Abuse Treatment   [facility] (Methadone Maintenance)   [address] [place] [address], [phone]  [address], [place] [address] [phone]  [facility], [address] [address], [phone]  [facility]  [place] Office: [address] [address], [phone]  [place] Office: [facility], [address], [place] [address], [phone]  [place] Office: [address] [place] [address] [phone]  [place] [facility], [address] [place] [address] [phone]   [facility], 9[address] [facility], [address], [phone]  [place]  [facility], [address] [place], [place] [phone]    Residential Substance Abuse Treatment   [facility] [address] [facility], [phone] or [phone]  [facility], [address] [facility] [facility], [phone]  [facility] (men only) [address] [place] [address], [phone]  Women at the [facility] (women only) [address] [place] [address] [phone]  [facility], [address] [place] [address] [phone]  [facility] (women only), intakes at [address] [facility], [phone]  [facility]onsibility [place] (7-day program, $100, [address], [phone], [phone], [phone])  [place] [facility] (Men only, [phone]), [address], [place] (Vets*/Non-Vets)  [facility] (Men only, $400/month program fee) 1528 [facility], [phone]  [facility] (Women over age 39 only), 2407 [facility], [phone]    Out of Area:    [place], [address] [place] [address], [place] [phone])  [facility] (men only), [address] [facility] [place] [address], [phone]  [facility], [address] [place], [place] ([phone])  [place], [facility] [place], [place] ([phone])  [facility] Recovery Center, 111 [facility], [phone]  [facility] (Women only, has to have mental illness, can be homeless or substance abuser), [phone]        DOMESTIC VIOLENCE RESOURCES:     Advocacy  [facility] ([facility])  [address] [place] [address]    NOFJC ? [phone]  Services provided: emergency shelter, individual advocacy, information and referrals, group support, children's program, medical advocacy, forensic medical exams, primary care, legal assistance, counseling, safety planning, and caregiver support    [facility] HEALING AND EMPOWERMENT [place]  Confidential location  [facility] ? [phone]  Services provided: short term emergency shelter, all services provided are free of charge    [facility] FOR COMMUNITY ADVOCACY  Multiple locations in [facility], [place], [facility], [place], and [facility] ([place], [place], and [place])    [place] ? [phone]  Services provided: emergency shelter, individual advocacy, information and referrals, group support, children's program, medical advocacy, legal assistance in obtaining restraining orders, counseling, safety planning, and caregiver support    [facility]   Emergency Shelter   [phone]  Emergency Services ,Legal and Financial Assistance Services ,Housing Services ,Support Services     [place] [facility]'s retirement   [phone]  Emergency Services ,Counseling Services , Housing Services ,Support Services ,Children's Services     WOMEN WITH A VISION  [address] [place] [address]  WWAV ? [phone]  Services provided: advocacy, health education and supportive services, specializing in free healing services for marginalized groups, including LGBTQ individuals and sex workers    SEXUAL TRAUMA AWARENESS AND RESPONSE (STAR)  [address] [place] [address]    STAR ? (455) 425-STAR  Services provided: individual advocacy, information and referrals, group support, medical advocacy, legal assistance, counseling, and safety planning for survivors of sexual assault    [facility] ([facility])  [address] [place] [address]  [facility] Forensic Program ? [phone]  Services provided: free forensic medical exams for sexual assault and domestic violence, which can be performed up to 5 days after an incident. It is not necessary to make a police report to receive a forensic medical exam    Legal  [facility]  [address] [address]  Project [facility] ? ([phone] 104-8906  Services provided: free emergency legal representation for survivors of doemstic  violence residing in [facility]. Legal services may include temporary restraining orders, temporary child support, custody, and use of property    [facility] LEGAL SERVICES (SLLS)  [address], [place] [address]  SLLS ? [phone]  Services provided: free legal representation for survivors of domestic violence residing in [facility]. Legal services may include temporary child support, custody, and divorce      HOTLINES:     [facility] DOMESTIC VIOLENCE HOTLINE  [phone]    Services provided: free and confidential hotline for victims and survivors of domestic violence. All calls will be routed to a domestic violence service provided in the victim or survivor's area    NATIONAL HUMAN TRAFFICKING HOTLINE  ([phone] 100-5433    Services provided: national anti-trafficking hotline serving victims and survivors of human trafficking. Provides information about local resources, and access to safe space to report tips, seek services, and ask for help    VIA LINK  211 or (403) 757-3289    Service provided: counselors can provide crisis counseling. Counselors can also provide information and referrals to programs which can help with needs such as food, shelter, medical care, financial assistance, mental health services, substance abuse treatment, senior services, childcare, and more      HOMELESS SHELTERS:      Homeless shelters  [facility]  Emergency shelter for individuals and families  [address]  [phone]  [facility]  Emergency shelter for men only  Meals daily 6am, 2pm, & 6pm  Clothing, case management M-F by appointment (ID/job/housing/legal assistance), mail  [address] [facility]  [phone]  [facility]  Emergency shelter for men  [address] [facility]  [phone]  Emergency shelter for women  [address] [facility]  [phone]  Breakfast & lunch daily, dinner M-F  Case management, job counseling services   [facility]  Emergency shelter for  teens and young adults up to 22yo  [address]  [phone]  [place] [facility]  Emergency shelter for women over 17yo and their kids  [address] [place] [address]  [phone]  [facility] [facility]  Day program, meals M-F 1PM (arrive early)  Showers, laundry, hygiene kits, showers, phones, , notary services, case management, ID assistance  [facility]   [phone] M-F 8am-2:30pm  [employer]  Day program  MTWF 7:30am-3:30pm,  8:30am-3:30pm  Crisis intervention, employment assistance, food/clothing, hygiene kits, bus tokens, mail  [address] [facility] [address]  [phone]  [facility]  Mobile outreach for homeless persons in [facility]  [phone]  [facility]  Primary healthcare, case management, dental services, TB placement  Call ahead  [facility] 2nd Floor  [phone]  [place] [facility]  Connects homeless people with their loved ones in other cities by providing transportation costs   [phone]      [place] RESOURCES:     [place] Mobile Mental Health Crisis Response Team:    Region 12 ([place], [place], [place]) ([facility])  [phone]      Outpatient Mental Health & Addiction Clinic Resources for both [facility] and [facility]:    [facility] Mental Healthcare Resources  Website: [email or web address]  Main Number: [phone]    [facility] ([facility])  [address] ([address] [facility]) [place] [address]  [phone]    [facility] ([facility])  [address] ([address]) [place] [address] [phone]    [facility]  [address] ([address]) [place] [address] [phone]    [facility]  [address] ([address] [facility]) [place] [address] [phone]      Addiction Treatment Resources for both [facility] and  [facility]:    [facility] (Detox, Residential, PHP, IOP, and Aftercare Programs)  [address] [place] [address]  [phone]    [facility] (Residential, IOP, Transitional Living, and Aftercare Programs)  [address] [place] [address]  [phone]    [facility] (Inpatient, Residential, Detox, IOP, Outpatient, and Aftercare Programs)  [address] [place] [address]  [phone] or toll free at [phone]      Outpatient Mental Health Psychotherapy Resources for both [facility] and [facility]:    Michell Freire, [facility]  [address]  [place] [address]  [phone]  Specialties: Depression, Anxiety, and Life Transitions    Debra Rosas, PhD  [address] [address]  [address]  [place] [address]  [phone]  Specialties: Testing and Evaluation, Education and Learning Disabilities, and ADHD    Loly Hines, [facility] [address] [place] [address]  [phone]  Specialties: Obsessive-Compulsive (OCD), Depression, and Relationship Issues    Marybel Escalante [facility] [address] [place] [address]  [phone]  Specialties: Trauma & PTSD, Mood Disorders, and Anxiety    Marybel Walker, PhD, [facility] [address] [place] [address]  [phone]  Specialties: Family Conflict, Child, and Relationship Issues    Molly Macedo [facility] [place] [address] [phone]  Specialties: Anxiety, Depression, and Anger Management        IN CASE OF SUICIDAL THINKING, call the National Suicide Hotline Number: 988    988 Suicide & Crisis Lifeline: 986 , 4-944-995-TALK (9022)  Provides 24/7, free and confidential support for people in distress, prevention and crisis resources for you or your loved ones, and best practices for professionals.    Call,  text or chat.  https://988SkemA.org

## 2022-09-30 NOTE — SUBJECTIVE & OBJECTIVE
Interval History: NAOE. Pt received X3 doses of ativan overnight. Her phosphate was 2.2. Plt of 52, will continue to monitor and to monitor signs for bleeding. Pt c/o of HA and poor appetite. Otherwise, is afebrile and VSS.      Review of Systems   Constitutional:  Positive for chills and fever.   HENT:  Negative for congestion and facial swelling.    Eyes:  Negative for visual disturbance.   Respiratory:  Positive for cough and shortness of breath.    Cardiovascular:  Negative for chest pain and leg swelling.   Gastrointestinal:  Positive for nausea and vomiting. Negative for diarrhea.   Genitourinary:  Negative for difficulty urinating and dysuria.   Musculoskeletal:  Negative for arthralgias.   Skin:  Negative for rash.   Neurological:  Positive for headaches.   Psychiatric/Behavioral:  Positive for agitation.    Objective:     Vital Signs (Most Recent):  Temp: 98.4 °F (36.9 °C) (09/30/22 1125)  Pulse: 103 (09/30/22 1125)  Resp: 17 (09/30/22 1125)  BP: (!) 159/77 (09/30/22 1125)  SpO2: 96 % (09/30/22 1125)   Vital Signs (24h Range):  Temp:  [97.5 °F (36.4 °C)-98.7 °F (37.1 °C)] 98.4 °F (36.9 °C)  Pulse:  [] 103  Resp:  [17-20] 17  SpO2:  [94 %-99 %] 96 %  BP: (120-159)/(59-77) 159/77     Weight: 86.2 kg (190 lb)  Body mass index is 30.67 kg/m².  No intake or output data in the 24 hours ending 09/30/22 1421   Physical Exam  Constitutional:       General: She is in acute distress.   HENT:      Head: Normocephalic and atraumatic.      Mouth/Throat:      Mouth: Mucous membranes are moist.      Pharynx: Oropharynx is clear.   Eyes:      General: No scleral icterus.     Extraocular Movements: Extraocular movements intact.      Conjunctiva/sclera: Conjunctivae normal.   Cardiovascular:      Rate and Rhythm: Regular rhythm. Tachycardia present.      Heart sounds: Normal heart sounds.   Pulmonary:      Effort: Pulmonary effort is normal.      Breath sounds: Normal breath sounds. No wheezing or rales.      Comments:  3L NC  Abdominal:      General: Abdomen is flat. Bowel sounds are normal.      Palpations: Abdomen is soft.   Musculoskeletal:         General: Normal range of motion.      Cervical back: Normal range of motion.      Right lower leg: No edema.      Left lower leg: No edema.   Skin:     General: Skin is warm and dry.   Neurological:      General: No focal deficit present.      Mental Status: She is alert.      Comments: Agitated, tremulous   Psychiatric:         Mood and Affect: Mood normal.       Significant Labs: All pertinent labs within the past 24 hours have been reviewed.  BMP:   Recent Labs   Lab 09/30/22 0230 09/30/22  0848   * 142*   * 132*   K 4.1 4.2   CL 96 94*   CO2 24 28   BUN <3* <3*   CREATININE 0.7 0.7   CALCIUM 8.4* 8.3*   MG 1.9  --      CBC:   Recent Labs   Lab 09/29/22 0355 09/30/22  0230   WBC 6.58 6.71   HGB 9.8* 10.0*   HCT 30.7* 31.4*   PLT 52* 41*     CMP:   Recent Labs   Lab 09/29/22 0354 09/29/22  0809 09/1958 09/30/22  0230 09/30/22  0848   *   < > 134* 132* 132*   K 4.6   < > 3.6 4.1 4.2   CL 93*   < > 93* 96 94*   CO2 29   < > 30* 24 28   *   < > 103 136* 142*   BUN 3*   < > 3* <3* <3*   CREATININE 0.7   < > 0.7 0.7 0.7   CALCIUM 8.5*   < > 8.9 8.4* 8.3*   PROT 6.0  --   --  6.1  --    ALBUMIN 3.6  --   --  3.6  --    BILITOT 0.8  --   --  0.6  --    ALKPHOS 50*  --   --  53*  --    AST 76*  --   --  71*  --    ALT 64*  --   --  68*  --    ANIONGAP 10   < > 11 12 10    < > = values in this interval not displayed.     Coagulation:   Recent Labs   Lab 09/29/22 0355   INR 1.1     Magnesium:   Recent Labs   Lab 09/28/22 1953 09/29/22 0354 09/30/22  0230   MG 1.8 1.6 1.9       Significant Imaging: I have reviewed all pertinent imaging results/findings within the past 24 hours.

## 2022-09-30 NOTE — ASSESSMENT & PLAN NOTE
This patient does not have evidence of infective focus  My overall impression is sepsis. Vital signs were reviewed and noted in progress note.  Antibiotics given-   Antibiotics (From admission, onward)    None        Cultures were taken-   Microbiology Results (last 7 days)     Procedure Component Value Units Date/Time    Blood culture x two cultures. Draw prior to antibiotics. [[id number]] Collected: 09/26/22 2145    Order Status: Completed Specimen: Blood from Peripheral, Hand, Left Updated: 09/29/22 2312     Blood Culture, Routine No Growth to date      No Growth to date      No Growth to date      No Growth to date    Narrative:      Aerobic and anaerobic    Blood culture x two cultures. Draw prior to antibiotics. [[id number]] Collected: 09/26/22 2146    Order Status: Completed Specimen: Blood from Peripheral, Forearm, Right Updated: 09/29/22 2312     Blood Culture, Routine No Growth to date      No Growth to date      No Growth to date      No Growth to date    Narrative:      Aerobic and anaerobic    Clostridium difficile EIA [[id number]]     Order Status: Canceled Specimen: Stool     Urine culture [[id number]] Collected: 09/27/22 0430    Order Status: Completed Specimen: Urine Updated: 09/28/22 1454     Urine Culture, Routine No significant growth    Narrative:      Specimen Source->Urine    Influenza A & B by Molecular [[id number]] Collected: 09/27/22 0421    Order Status: Completed Specimen: Nasopharyngeal Swab Updated: 09/27/22 0552     Influenza A, Molecular Negative     Influenza B, Molecular Negative     Flu A & B Source Nasal swab        Latest lactate reviewed, they are-  No results for input(s): LACTATE in the last 72 hours.    Source- undetermined     Source control Achieved by- vanc/cefepime    Patient with T101, tachycardia to 130s, and WBC 27 in ED. Given 1x vanc/zosyn in ED for broad spectrum coverage, 1L LR fluid resuscitation. CXR with no acute abnormality, UA pending. Patient reports flu-like  symptoms with increased cough/SOB, muscle aches, and chills.  -vanc/cefepime for broad spectrum coverage  -1L LR bolus added  - Deescalate to cefepime given negative cultures  - 9/29, discontinue all antibiotics as source of infection/fever unclear

## 2022-09-30 NOTE — PROGRESS NOTES
"[facility] - Telemetry [facility] Medicine  Progress Note    Patient Name: Earl Abdul  MRN: [id number]  Patient Class: IP- Inpatient   Admission Date: 9/26/2022  Length of Stay: 3 days  Attending Physician: Gallito Crenshaw DO  Primary Care Provider: Andrew Rodriguez MD        Subjective:     Principal Problem:Alcohol withdrawal        HPI:  63 y/o F with a PMHx of CLL/MBCL, sarcoidosis, T2DM, COPD on QHS O2, HLD, HTN and migraines presents to the ED with alcohol withdrawal and flu-like symptoms. Patient reports that she had a bad week and had been drinking, last drink around lunchtime 9/26. She had 3 drinks 9/26 prior to presentation. She is tremulous and reports a headache, but states that it is similar to her migraines. She reports that her  recently had a "bad cold," and she has had similar symptoms over about 1 week including increased cough and SOB, chills, and muscle aches. She states that when she coughs very hard she gags and spits up phlegm. She also reports decreased PO intake.    In the ED, she was given 2mg lorazepam x1 for CIWA 21, tremulous and agitated. Tylenol for T101. CBC remarkable for WBC 27, H/H 11.7/37.2, plt 103. CMP remarkable for CO2 12, anion gap 31, AST 70, ALT 51. Serum alcohol level 165. Lactic acid pending. Patient given 1L LR and 1x vanc/zosyn in ED. Admitted to medicine for mangement of alcohol withdrawal, workup and management of possible sepsis.            Overview/Hospital Course:  Patient admitted to  for management of etoh withdrawal, concern for sepsis as well as starvation/etoh acidosis. Patient found to have AG of 31 and Bicarb of 12 with BHB of 7 which resolved with D5 1/2. Patient also given IV thiamine. Concern for refeeding syndrome given phos<1, hypoK and Mag with aggressive repletion. Concern for withdrawal and patient was started on valium taper as well as PRN ativan. Broad spectrum abx were started with no clear sepsis source and deescalated to " cefepime, abx now d/c. Patient has initiated discussion about etoh or depression with psychiatry, however attributes symptoms to ongoing migraines and concern for dx of DM2.       Interval History: NAOE. Pt received X3 doses of ativan overnight. Her phosphate was 2.2. Plt of 52, will continue to monitor and to monitor signs for bleeding. Pt c/o of HA and poor appetite. Otherwise, is afebrile and VSS.      Review of Systems   Constitutional:  Positive for chills and fever.   HENT:  Negative for congestion and facial swelling.    Eyes:  Negative for visual disturbance.   Respiratory:  Positive for cough and shortness of breath.    Cardiovascular:  Negative for chest pain and leg swelling.   Gastrointestinal:  Positive for nausea and vomiting. Negative for diarrhea.   Genitourinary:  Negative for difficulty urinating and dysuria.   Musculoskeletal:  Negative for arthralgias.   Skin:  Negative for rash.   Neurological:  Positive for headaches.   Psychiatric/Behavioral:  Positive for agitation.    Objective:     Vital Signs (Most Recent):  Temp: 98.4 °F (36.9 °C) (09/30/22 1125)  Pulse: 103 (09/30/22 1125)  Resp: 17 (09/30/22 1125)  BP: (!) 159/77 (09/30/22 1125)  SpO2: 96 % (09/30/22 1125)   Vital Signs (24h Range):  Temp:  [97.5 °F (36.4 °C)-98.7 °F (37.1 °C)] 98.4 °F (36.9 °C)  Pulse:  [] 103  Resp:  [17-20] 17  SpO2:  [94 %-99 %] 96 %  BP: (120-159)/(59-77) 159/77     Weight: 86.2 kg (190 lb)  Body mass index is 30.67 kg/m².  No intake or output data in the 24 hours ending 09/30/22 1421   Physical Exam  Constitutional:       General: She is in acute distress.   HENT:      Head: Normocephalic and atraumatic.      Mouth/Throat:      Mouth: Mucous membranes are moist.      Pharynx: Oropharynx is clear.   Eyes:      General: No scleral icterus.     Extraocular Movements: Extraocular movements intact.      Conjunctiva/sclera: Conjunctivae normal.   Cardiovascular:      Rate and Rhythm: Regular rhythm. Tachycardia  present.      Heart sounds: Normal heart sounds.   Pulmonary:      Effort: Pulmonary effort is normal.      Breath sounds: Normal breath sounds. No wheezing or rales.      Comments: 3L NC  Abdominal:      General: Abdomen is flat. Bowel sounds are normal.      Palpations: Abdomen is soft.   Musculoskeletal:         General: Normal range of motion.      Cervical back: Normal range of motion.      Right lower leg: No edema.      Left lower leg: No edema.   Skin:     General: Skin is warm and dry.   Neurological:      General: No focal deficit present.      Mental Status: She is alert.      Comments: Agitated, tremulous   Psychiatric:         Mood and Affect: Mood normal.       Significant Labs: All pertinent labs within the past 24 hours have been reviewed.  BMP:   Recent Labs   Lab 09/30/22  0230 09/30/22  0848   * 142*   * 132*   K 4.1 4.2   CL 96 94*   CO2 24 28   BUN <3* <3*   CREATININE 0.7 0.7   CALCIUM 8.4* 8.3*   MG 1.9  --      CBC:   Recent Labs   Lab 09/29/22  0355 09/30/22  0230   WBC 6.58 6.71   HGB 9.8* 10.0*   HCT 30.7* 31.4*   PLT 52* 41*     CMP:   Recent Labs   Lab 09/29/22  0354 09/29/22  0809 09/29/22  1958/22  0230 09/30/22  0848   *   < > 134* 132* 132*   K 4.6   < > 3.6 4.1 4.2   CL 93*   < > 93* 96 94*   CO2 29   < > 30* 24 28   *   < > 103 136* 142*   BUN 3*   < > 3* <3* <3*   CREATININE 0.7   < > 0.7 0.7 0.7   CALCIUM 8.5*   < > 8.9 8.4* 8.3*   PROT 6.0  --   --  6.1  --    ALBUMIN 3.6  --   --  3.6  --    BILITOT 0.8  --   --  0.6  --    ALKPHOS 50*  --   --  53*  --    AST 76*  --   --  71*  --    ALT 64*  --   --  68*  --    ANIONGAP 10   < > 11 12 10    < > = values in this interval not displayed.     Coagulation:   Recent Labs   Lab 09/29/22  0355   INR 1.1     Magnesium:   Recent Labs   Lab 09/28/22  1953 09/29/22  0354 09/30/22  0230   MG 1.8 1.6 1.9       Significant Imaging: I have reviewed all pertinent imaging results/findings within the past 24  hours.      Assessment/Plan:      * Alcohol withdrawal  62 y.o. F with history of alcohol abuse presents with symptoms of withdrawal (tremulous, agitated) after 3 drinks 9/26 morning. Patient has hx previous hospitalizations for withdrawal. CIWA 21 in ED, given 2mg lorazepam IV.     -valium taper - started on 10mg q6h, now will be on Q8.  Patient with serum ethanol 165 and showing signs of withdrawal.   -CIWA protocol  -lorazepam 2mg IV PRN   -seizure precautions  -aspiration precautions      Migraine  - outpatient referral to neurology      Refeeding syndrome  Phos <1, Mg 1.3 and K low as well concerning for refeeding in setting of D5 given for starvation ketosis. Patient reports not eating for some time prior to admission.     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 WBC decreased and patient meeting criteria for MBCL per heme/onc clinic note 08/2022. Not on treatment. Patient with WBC 27 in ED 9/26, ddx includes sepsis vs CLL.       Chronic hypoxemic respiratory failure  Patient with Hypoxic Respiratory failure which is Chronic.  she is on home oxygen at 3 LPM. Supplemental oxygen was provided and noted-  .   Signs/symptoms of respiratory failure include- increased work of breathing. Contributing diagnoses includes - COPD Labs and images were reviewed. Patient Has not had a recent ABG. Will treat underlying causes and adjust management of respiratory failure as follows-     -continue 3L NC    Type 2 diabetes mellitus with hyperglycemia  Patient's FSGs are controlled on current medication regimen.  Last A1c reviewed-   Lab Results   Component Value Date    HGBA1C 5.1 09/27/2022     Most recent fingerstick glucose reviewed-   Recent Labs   Lab 09/28/22  0041 09/28/22  0715 09/28/22  1110 09/28/22  1150   POCTGLUCOSE  224* 186* 224* 243*     Current correctional scale  Low  May adjust anti-hyperglycemic dose as follows-   Antihyperglycemics (From admission, onward)    Start     Stop Route Frequency Ordered    09/28/22 2100  insulin detemir U-100 pen 7 Units         -- SubQ Nightly 09/28/22 1617    09/27/22 0248  insulin aspart U-100 pen 0-5 Units         -- SubQ Before meals & nightly PRN 09/27/22 0149        Hold Oral hypoglycemics while patient is in the hospital.    Detemir 7U  LDSSI    Hypothyroidism  Continue home synthroid      COPD (chronic obstructive pulmonary disease)  On 3L NC home O2      Depression with anxiety  Continue home duloxetine and aripiprazole. Patient reports ongoing uncontrolled depression but denies any SI currently. Reports this contributes to her lack of appetite. Defer psych evaluation at this time but will re approach topic with patient closer to [place]  - resumed home trazodone 300 mg QD      Severe sepsis  This patient does not have evidence of infective focus  My overall impression is sepsis. Vital signs were reviewed and noted in progress note.  Antibiotics given-   Antibiotics (From admission, onward)    None        Cultures were taken-   Microbiology Results (last 7 days)     Procedure Component Value Units Date/Time    Blood culture x two cultures. Draw prior to antibiotics. [[id number]] Collected: 09/26/22 2145    Order Status: Completed Specimen: Blood from Peripheral, Hand, Left Updated: 09/29/22 2312     Blood Culture, Routine No Growth to date      No Growth to date      No Growth to date      No Growth to date    Narrative:      Aerobic and anaerobic    Blood culture x two cultures. Draw prior to antibiotics. [[id number]] Collected: 09/26/22 2146    Order Status: Completed Specimen: Blood from Peripheral, Forearm, Right Updated: 09/29/22 2312     Blood Culture, Routine No Growth to date      No Growth to date      No Growth to date      No Growth to date    Narrative:      Aerobic and  anaerobic    Clostridium difficile EIA [[id number]]     Order Status: Canceled Specimen: Stool     Urine culture [[id number]] Collected: 09/27/22 0430    Order Status: Completed Specimen: Urine Updated: 09/28/22 1454     Urine Culture, Routine No significant growth    Narrative:      Specimen Source->Urine    Influenza A & B by Molecular [[id number]] Collected: 09/27/22 0421    Order Status: Completed Specimen: Nasopharyngeal Swab Updated: 09/27/22 0552     Influenza A, Molecular Negative     Influenza B, Molecular Negative     Flu A & B Source Nasal swab        Latest lactate reviewed, they are-  No results for input(s): LACTATE in the last 72 hours.    Source- undetermined     Source control Achieved by- vanc/cefepime    Patient with T101, tachycardia to 130s, and WBC 27 in ED. Given 1x vanc/zosyn in ED for broad spectrum coverage, 1L LR fluid resuscitation. CXR with no acute abnormality, UA pending. Patient reports flu-like symptoms with increased cough/SOB, muscle aches, and chills.  -vanc/cefepime for broad spectrum coverage  -1L LR bolus added  - Deescalate to cefepime given negative cultures  - 9/29, discontinue all antibiotics as source of infection/fever unclear      Essential hypertension  Holding home BP meds due to possible sepsis      Alcohol use disorder, severe, dependence  Patient in alcohol withdrawal, long-standing history of alcohol dependence, previous hospitalizations for withdrawal    -high-dose thiamine to prevent wernicke encephalopathy  -management of withdrawal        VTE Risk Mitigation (From admission, onward)         Ordered     IP VTE HIGH RISK PATIENT  Once         09/27/22 0149     Place sequential compression device  Until discontinued         09/27/22 0149                Discharge Planning   BECCA: 10/3/2022     Code Status: Full Code   Is the patient medically ready for discharge?: No    Reason for patient still in hospital (select all that apply): Patient trending condition  Discharge  Plan A: Home with family                  Lucille Gonzáles MD  Department of Hospital Medicine   [facility] - Telemetry Stepdown

## 2022-09-30 NOTE — PROGRESS NOTES
ADDICTION CONSULT FOLLOW UP VISIT     DEPARTMENT:  Psychiatry  SITE: [facility], [address]    DATE OF ADMISSION: 9/26/2022  8:45 PM  LENGTH OF STAY: 3 days    EXAMINING PRACTITIONER: Sarah Mckay      SUBJECTIVE:     HISTORY    Patient Name: Earl Abdul  YOB: 1960    CHIEF COMPLAINT   Earl Abdul is a 62 y.o. female who is being seen today for a follow up visit by the addiction psychiatry consult service.  Earl Abdul presents with the chief complaint of: substance use/withdrawal    HPI & PSYCHIATRIC ROS    Pt states that she came to the hospital for severe alcohoil withdrawal and headache. Pt has been drinking liquor daily for a while and wants to stop. Pt did not give a reason as to why. Pt has history of compliacted withdarwals in the past with most often getting hallucinations. Pt had hallucination of a monkey in her room this AM. Pt did seize 5-6 years ago from alcohol withdrawal. Pt feels nauseated and vomitus. Pt also very shaky though does have baseline tremor per  and pt. Discussed and pt's tremors are more severe than what she has at home. Note pt shaky throughout finger to touch. PT's tonuge not shaky on exam. Pt states that overall she feels like she is getting better and she wants to leave the hospital soon. Discussed and pt wants to leave because she thinks she does notneed to be here. Motivational interviewing to stay unsuccessful and pt states she will talk to her primary team. Pt denies any opther substance use. Pt confirms history as outlined below. Pt does endorse worsening depression and anxiety that she recieves trazodone cymbalta and abilfiy from her PCP. Pt does endorse issues with sleep and appetite though states these are starting to improve.  Interview ended when pt needed to go to restroom.    Interim Hx:  A&Ox4. I-CAM negative.   Mildly tachycardic to 103 bpm, otherwise vital signs unremarkable.  Transitioned to Valium 10 mg BID  "today, required Diazepam at 1500,  2310, and 0408. Mild anxiety and agitation at present, no tremors, N/V, diaphoresis or hallucinations.  Notes depression, decreased sleep, decreased appetite. No SI/plan/intent, HI/plan/intent, or AVH. No signs or symptomatology of gabe or psychosis.  Discussed resources for cessation including inpatient rehab, IOP, AA/NA.  Will think about options.     PFSH: The patient's past medical history has been reviewed and updated as appropriate within the electronic medical record system.    ROS:  All other review of systems are negative.       PSYCHOTROPIC MEDICATIONS   Duloxentine 60 mg PO nightly  Abilify 5 mg PO daily      OBJECTIVE:     EXAMINATION    Vitals:    09/30/22 0010 09/30/22 0423 09/30/22 0842 09/30/22 0919   BP: (!) 120/59 (!) 140/65 123/61    BP Location: Right arm Right arm Right arm    Patient Position: Lying Lying Lying    Pulse: 88 100 91 94   Resp: 18 18 18 18   Temp: 98.4 °F (36.9 °C) 98.4 °F (36.9 °C) 98.7 °F (37.1 °C)    TempSrc: Oral Oral Oral    SpO2: 99% 97% (!) 94% (!) 94%   Weight:           CONSTITUTIONAL  MENTAL STATUS EXAMINATION  General Appearance: appropriately dressed, adequately groomed  Behavior: cooperative, appropriate eye contact, under good behavioral control  Movements and Motor Activity: no abnormal involuntary movements noted, no psychomotor agitation or retardation  Gait and Station: intact, normal gait and station, ambulates without assistance  Speech: normal rate/rhythm/volume/tone, conversational, spontaneous, coherent  Language: fluent English, repeats words/phrases, no word-finding difficulties noted  Mood: "okay"  Affect: euthymic, reactive, appropriate  Thought Process: linear, goal-directed, organized, logical  Associations: intact, no loosening of associations  Thought Content: no suicidal ideation, no homicidal ideation, no paranoid ideation, no ideas of reference, no evidence of delusions or psychosis  Perceptions: no auditory or " visual hallucinations  Sensorium: alert and awake. I-CAM negative..  Orientation: oriented fully to person, place, time, and situation  Recent and Remote Memory: recent memory intact, remote memory intact, no impairments noted  Attention and Concentration: intact, attentive to conversation, not distractible  Fund of Knowledge: intact, aware of current events, vocabulary appropriate  Insight: intact, demonstrates awareness of situation  Judgment: intact, behavior is adequate/appropriate to the circumstances    ASSESSMENT:     DIAGNOSES & PROBLEMS    Alcohol use disorder, current, severe, with complicated withdrawal  Cannabis use disorder  Depressive disorder, unspecified  General anxiety disorder    Patient Active Problem List   Diagnosis    Alcohol use disorder, severe, dependence    Alcohol withdrawal    Essential hypertension    Fibromyalgia    Tobacco abuse    Cannabis abuse, in remission    Sarcoidosis    History of Clostridium difficile colitis    Diarrhea    Dehydration    History of substance abuse    Severe sepsis    Pyelonephritis due to Escherichia coli    New onset seizure    Posterior reversible encephalopathy syndrome    Depression with anxiety    Erythema nodosum    History of sarcoidosis    Hyperlipidemia    Ramírez's syndrome    Degenerative joint disease (DJD) of lumbar spine    Decreased ROM of lumbar spine    Difficulty walking    VINICIO (obstructive sleep apnea)    At risk for lymphedema    Malignant neoplasm of central portion of right breast in female, estrogen receptor positive    COPD (chronic obstructive pulmonary disease)    Incontinence of feces    Low serum vitamin B12    Anemia    Urge incontinence of urine    Hypothyroidism    Type 2 diabetes mellitus with hyperglycemia    Obesity (BMI 30.0-34.9)    Fecal incontinence    Mixed incontinence    Pelvic floor tension    Chronic hypoxemic respiratory failure    COVID-19    Hypoxia    Shortness of breath    Alcoholic ketoacidosis    E coli  bacteremia    Lymphocytosis    Migraine without aura and with status migrainosus, not intractable    OAB (overactive bladder)    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 Monitor for delirium tremens and seizures. Recommend/agree with benzo taper:  - On valium 10mg BID, can continue taper  - Frequent assessment with the CIWA-Ar is the standard of care and the hallmark of clinical practice  - Clinical Amelia Withdrawal Assessment of Alcohol Scale (CIWA-Ar)  If CIWA is <8 and vital signs are stable, no PRN medication is required. Repeat vital signs q4 and the CIWA q8.  If CIWA is between 8 and 15, give Lorazepam 2 mg PO/IM q4 PRN and complete vital signs q2 and the CIWA q4.  If CIWA is ?15 or DBP ?110 mmHg, give Lorazepam 2 mg PO/IM q1 until patient has a CIWA of <15 or DBP <110 mmHg. CIWA and vital signs checked q1 until patients CIWA is <15 and DBP <110 mmHg  Call MD for for SBP >180, DBP >120, or HR >110 or if patient requires ?6 mg of lorazepam in 3 hours.  - Vitals q4hr; Ativan 2mg PO/IM q4hr PRN, for CIWA >8 or if 2 criteria are met: Systolic BP>160, Diastolic BP>100 or HR>110  - Vitamin supplementation - Thiamine 100 mg daily, Folate 1 mg daily, MVI daily, and Vitamin B12  - If not already ordered: CBC, CMP, liver panel (prior to initiating taper), B12, folate, iron panel, thiamine level.  - Seizure precautions. Seizures generally occur between 12-48 hours after alcohol cessation. Monitor for  hypoglycemia, hypocalcemia, and hypomagnesemia as these can predispose to seizure.  - Monitor for AMS, ataxia and nystagmus as these can be signs of Wernicke's Encephalopathy.  -Resources for cessation placed in discharge tab     Depression and anxiety  - continue cymbalta 60mg QHS  - continue abilify 5mg daily  - noted primary team held trazodone 300mg QHS  - note Rx propranolol, and synthroid  - place ambulatory referral to psych      In cases of emergency, daily coverage provided by Acute/ER Psych MD, NP, or SW, with contact numbers located in [facility] On Call Schedule    Case discussed with staff addiction psychiatrist: MD Sarah Arroyo MD   [facility] Psychiatry PGY2

## 2022-09-30 NOTE — PLAN OF CARE
[facility] Tele[facility] Stepdown  Discharge Reassessment    Primary Care Provider: Andrew Rodriguez MD    Expected Discharge Date: 10/3/2022    Reassessment (most recent)       Discharge Reassessment - 09/30/22 1533          Discharge Reassessment    Assessment Type Discharge Planning Reassessment     Did the patient's condition or plan change since previous assessment? No     Discharge Plan discussed with: Patient     Discharge Plan A Home with family     Discharge Plan B Home Health     DME Needed Upon Discharge  other (see comments)   TBD    Discharge Barriers Identified None     Why the patient remains in the hospital Requires continued medical care        Post-Acute Status    Post-Acute Authorization Other     Other Status No Post-Acute Service Needs                     Saundra Knight RN  Ext [phone]

## 2022-10-01 LAB
ALBUMIN SERPL BCP-MCNC: 3.1 G/DL (ref 3.5–5.2)
ALP SERPL-CCNC: 60 U/L (ref 55–135)
ALT SERPL W/O P-5'-P-CCNC: 56 U/L (ref 10–44)
ANION GAP SERPL CALC-SCNC: 10 MMOL/L (ref 8–16)
ANION GAP SERPL CALC-SCNC: 8 MMOL/L (ref 8–16)
ANION GAP SERPL CALC-SCNC: 9 MMOL/L (ref 8–16)
ANISOCYTOSIS BLD QL SMEAR: SLIGHT
AST SERPL-CCNC: 46 U/L (ref 10–40)
BACTERIA BLD CULT: NORMAL
BACTERIA BLD CULT: NORMAL
BASOPHILS # BLD AUTO: 0.02 K/UL (ref 0–0.2)
BASOPHILS NFR BLD: 0.4 % (ref 0–1.9)
BILIRUB SERPL-MCNC: 0.4 MG/DL (ref 0.1–1)
BUN SERPL-MCNC: 3 MG/DL (ref 8–23)
BUN SERPL-MCNC: 5 MG/DL (ref 8–23)
BUN SERPL-MCNC: 6 MG/DL (ref 8–23)
CALCIUM SERPL-MCNC: 8.1 MG/DL (ref 8.7–10.5)
CALCIUM SERPL-MCNC: 8.4 MG/DL (ref 8.7–10.5)
CALCIUM SERPL-MCNC: 9.1 MG/DL (ref 8.7–10.5)
CHLORIDE SERPL-SCNC: 100 MMOL/L (ref 95–110)
CHLORIDE SERPL-SCNC: 95 MMOL/L (ref 95–110)
CHLORIDE SERPL-SCNC: 96 MMOL/L (ref 95–110)
CO2 SERPL-SCNC: 24 MMOL/L (ref 23–29)
CO2 SERPL-SCNC: 29 MMOL/L (ref 23–29)
CO2 SERPL-SCNC: 30 MMOL/L (ref 23–29)
CREAT SERPL-MCNC: 0.8 MG/DL (ref 0.5–1.4)
DIFFERENTIAL METHOD: ABNORMAL
EOSINOPHIL # BLD AUTO: 0.1 K/UL (ref 0–0.5)
EOSINOPHIL NFR BLD: 1.1 % (ref 0–8)
ERYTHROCYTE [DISTWIDTH] IN BLOOD BY AUTOMATED COUNT: 17.7 % (ref 11.5–14.5)
EST. GFR  (NO RACE VARIABLE): >60 ML/MIN/1.73 M^2
GLUCOSE SERPL-MCNC: 124 MG/DL (ref 70–110)
GLUCOSE SERPL-MCNC: 160 MG/DL (ref 70–110)
GLUCOSE SERPL-MCNC: 169 MG/DL (ref 70–110)
HCT VFR BLD AUTO: 30 % (ref 37–48.5)
HGB BLD-MCNC: 9.1 G/DL (ref 12–16)
HYPOCHROMIA BLD QL SMEAR: ABNORMAL
IMM GRANULOCYTES # BLD AUTO: 0.03 K/UL (ref 0–0.04)
IMM GRANULOCYTES NFR BLD AUTO: 0.6 % (ref 0–0.5)
LYMPHOCYTES # BLD AUTO: 3.2 K/UL (ref 1–4.8)
LYMPHOCYTES NFR BLD: 61.8 % (ref 18–48)
MAGNESIUM SERPL-MCNC: 1.7 MG/DL (ref 1.6–2.6)
MCH RBC QN AUTO: 29.6 PG (ref 27–31)
MCHC RBC AUTO-ENTMCNC: 30.3 G/DL (ref 32–36)
MCV RBC AUTO: 98 FL (ref 82–98)
MONOCYTES # BLD AUTO: 0.7 K/UL (ref 0.3–1)
MONOCYTES NFR BLD: 14 % (ref 4–15)
NEUTROPHILS # BLD AUTO: 1.2 K/UL (ref 1.8–7.7)
NEUTROPHILS NFR BLD: 22.1 % (ref 38–73)
NRBC BLD-RTO: 0 /100 WBC
PHOSPHATE SERPL-MCNC: 3.9 MG/DL (ref 2.7–4.5)
PLATELET # BLD AUTO: 65 K/UL (ref 150–450)
PLATELET BLD QL SMEAR: ABNORMAL
PMV BLD AUTO: 11 FL (ref 9.2–12.9)
POCT GLUCOSE: 135 MG/DL (ref 70–110)
POCT GLUCOSE: 198 MG/DL (ref 70–110)
POCT GLUCOSE: 98 MG/DL (ref 70–110)
POLYCHROMASIA BLD QL SMEAR: ABNORMAL
POTASSIUM SERPL-SCNC: 3.6 MMOL/L (ref 3.5–5.1)
POTASSIUM SERPL-SCNC: 3.7 MMOL/L (ref 3.5–5.1)
POTASSIUM SERPL-SCNC: 4.3 MMOL/L (ref 3.5–5.1)
PROT SERPL-MCNC: 5.3 G/DL (ref 6–8.4)
RBC # BLD AUTO: 3.07 M/UL (ref 4–5.4)
SODIUM SERPL-SCNC: 133 MMOL/L (ref 136–145)
SODIUM SERPL-SCNC: 134 MMOL/L (ref 136–145)
SODIUM SERPL-SCNC: 134 MMOL/L (ref 136–145)
WBC # BLD AUTO: 5.23 K/UL (ref 3.9–12.7)

## 2022-10-01 PROCEDURE — 25000003 PHARM REV CODE 250: Performed by: STUDENT IN AN ORGANIZED HEALTH CARE EDUCATION/TRAINING PROGRAM

## 2022-10-01 PROCEDURE — 25000003 PHARM REV CODE 250

## 2022-10-01 PROCEDURE — 85025 COMPLETE CBC W/AUTO DIFF WBC: CPT

## 2022-10-01 PROCEDURE — 99233 PR SUBSEQUENT HOSPITAL CARE,LEVL III: ICD-10-PCS | Mod: ,,, | Performed by: STUDENT IN AN ORGANIZED HEALTH CARE EDUCATION/TRAINING PROGRAM

## 2022-10-01 PROCEDURE — 63600175 PHARM REV CODE 636 W HCPCS: Performed by: STUDENT IN AN ORGANIZED HEALTH CARE EDUCATION/TRAINING PROGRAM

## 2022-10-01 PROCEDURE — 36415 COLL VENOUS BLD VENIPUNCTURE: CPT

## 2022-10-01 PROCEDURE — 63600175 PHARM REV CODE 636 W HCPCS

## 2022-10-01 PROCEDURE — 83735 ASSAY OF MAGNESIUM: CPT

## 2022-10-01 PROCEDURE — 80048 BASIC METABOLIC PNL TOTAL CA: CPT | Mod: 91,XB | Performed by: STUDENT IN AN ORGANIZED HEALTH CARE EDUCATION/TRAINING PROGRAM

## 2022-10-01 PROCEDURE — 99233 SBSQ HOSP IP/OBS HIGH 50: CPT | Mod: ,,, | Performed by: STUDENT IN AN ORGANIZED HEALTH CARE EDUCATION/TRAINING PROGRAM

## 2022-10-01 PROCEDURE — 80053 COMPREHEN METABOLIC PANEL: CPT

## 2022-10-01 PROCEDURE — 20600001 HC STEP DOWN PRIVATE ROOM

## 2022-10-01 PROCEDURE — 36415 COLL VENOUS BLD VENIPUNCTURE: CPT | Performed by: STUDENT IN AN ORGANIZED HEALTH CARE EDUCATION/TRAINING PROGRAM

## 2022-10-01 PROCEDURE — 84100 ASSAY OF PHOSPHORUS: CPT | Performed by: STUDENT IN AN ORGANIZED HEALTH CARE EDUCATION/TRAINING PROGRAM

## 2022-10-01 RX ORDER — TRAZODONE HYDROCHLORIDE 300 MG/1
300 TABLET ORAL NIGHTLY
Qty: 30 TABLET | Refills: 11 | Status: SHIPPED | OUTPATIENT
Start: 2022-10-01 | End: 2023-04-06 | Stop reason: SDUPTHER

## 2022-10-01 RX ORDER — DIAZEPAM 5 MG/1
5 TABLET ORAL DAILY
Qty: 1 TABLET | Refills: 0 | Status: CANCELLED | OUTPATIENT
Start: 2022-10-02 | End: 2022-11-01

## 2022-10-01 RX ORDER — LANOLIN ALCOHOL/MO/W.PET/CERES
100 CREAM (GRAM) TOPICAL DAILY
Qty: 90 TABLET | Refills: 3 | Status: ON HOLD | OUTPATIENT
Start: 2022-10-02 | End: 2023-03-29

## 2022-10-01 RX ORDER — DIAZEPAM 5 MG/1
5 TABLET ORAL ONCE
Status: COMPLETED | OUTPATIENT
Start: 2022-10-02 | End: 2022-10-02

## 2022-10-01 RX ORDER — BUTALBITAL, ACETAMINOPHEN AND CAFFEINE 50; 325; 40 MG/1; MG/1; MG/1
1 TABLET ORAL ONCE
Status: COMPLETED | OUTPATIENT
Start: 2022-10-01 | End: 2022-10-01

## 2022-10-01 RX ORDER — LISINOPRIL 20 MG/1
20 TABLET ORAL EVERY MORNING
Status: ON HOLD
Start: 2022-10-01 | End: 2022-12-23

## 2022-10-01 RX ORDER — DIAZEPAM 5 MG/1
5 TABLET ORAL ONCE
Qty: 1 TABLET | Refills: 0 | Status: SHIPPED | OUTPATIENT
Start: 2022-10-02 | End: 2022-10-02 | Stop reason: HOSPADM

## 2022-10-01 RX ORDER — PROPRANOLOL HYDROCHLORIDE 10 MG/1
10 TABLET ORAL 2 TIMES DAILY
Status: ON HOLD
Start: 2022-10-01 | End: 2023-03-29

## 2022-10-01 RX ORDER — FOLIC ACID 1 MG/1
1 TABLET ORAL DAILY
Qty: 90 TABLET | Refills: 3 | Status: ON HOLD | OUTPATIENT
Start: 2022-10-02 | End: 2022-12-23

## 2022-10-01 RX ORDER — DIAZEPAM 5 MG/1
5 TABLET ORAL 2 TIMES DAILY
Status: COMPLETED | OUTPATIENT
Start: 2022-10-01 | End: 2022-10-01

## 2022-10-01 RX ADMIN — DIAZEPAM 5 MG: 5 TABLET ORAL at 09:10

## 2022-10-01 RX ADMIN — BUTALBITAL, ACETAMINOPHEN, AND CAFFEINE 1 TABLET: 50; 325; 40 TABLET ORAL at 04:10

## 2022-10-01 RX ADMIN — DIAZEPAM 5 MG: 5 TABLET ORAL at 08:10

## 2022-10-01 RX ADMIN — ACETAMINOPHEN 650 MG: 325 TABLET ORAL at 01:10

## 2022-10-01 RX ADMIN — INSULIN ASPART 2 UNITS: 100 INJECTION, SOLUTION INTRAVENOUS; SUBCUTANEOUS at 09:10

## 2022-10-01 RX ADMIN — THIAMINE HYDROCHLORIDE 500 MG: 100 INJECTION, SOLUTION INTRAMUSCULAR; INTRAVENOUS at 04:10

## 2022-10-01 RX ADMIN — ARIPIPRAZOLE 5 MG: 5 TABLET ORAL at 08:10

## 2022-10-01 RX ADMIN — DULOXETINE 60 MG: 60 CAPSULE, DELAYED RELEASE ORAL at 08:10

## 2022-10-01 RX ADMIN — LEVOTHYROXINE SODIUM 50 MCG: 50 TABLET ORAL at 06:10

## 2022-10-01 RX ADMIN — ONDANSETRON 4 MG: 2 INJECTION INTRAMUSCULAR; INTRAVENOUS at 09:10

## 2022-10-01 RX ADMIN — GABAPENTIN 600 MG: 300 CAPSULE ORAL at 08:10

## 2022-10-01 RX ADMIN — SALMETEROL XINAFOATE 1 PUFF: 50 POWDER, METERED ORAL; RESPIRATORY (INHALATION) at 08:10

## 2022-10-01 RX ADMIN — THIAMINE HYDROCHLORIDE 500 MG: 100 INJECTION, SOLUTION INTRAMUSCULAR; INTRAVENOUS at 09:10

## 2022-10-01 RX ADMIN — LORAZEPAM 2 MG: 1 TABLET ORAL at 12:10

## 2022-10-01 RX ADMIN — FOLIC ACID 1 MG: 1 TABLET ORAL at 08:10

## 2022-10-01 RX ADMIN — BUTALBITAL, ACETAMINOPHEN, AND CAFFEINE 1 TABLET: 50; 325; 40 TABLET ORAL at 08:10

## 2022-10-01 RX ADMIN — INSULIN DETEMIR 7 UNITS: 100 INJECTION, SOLUTION SUBCUTANEOUS at 08:10

## 2022-10-01 RX ADMIN — TRAZODONE HYDROCHLORIDE 300 MG: 100 TABLET ORAL at 08:10

## 2022-10-01 RX ADMIN — Medication 6 MG: at 12:10

## 2022-10-01 NOTE — PROGRESS NOTES
"[facility] Tele[facility] Medicine  Progress Note    Patient Name: Earl Abdul  MRN: [id number]  Patient Class: IP- Inpatient   Admission Date: 9/26/2022  Length of Stay: 4 days  Attending Physician: Tara Javier DO  Primary Care Provider: Andrew Rodriguez MD        Subjective:     Principal Problem:Alcohol withdrawal        HPI:  63 y/o F with a PMHx of CLL/MBCL, sarcoidosis, T2DM, COPD on QHS O2, HLD, HTN and migraines presents to the ED with alcohol withdrawal and flu-like symptoms. Patient reports that she had a bad week and had been drinking, last drink around lunchtime 9/26. She had 3 drinks 9/26 prior to presentation. She is tremulous and reports a headache, but states that it is similar to her migraines. She reports that her  recently had a "bad cold," and she has had similar symptoms over about 1 week including increased cough and SOB, chills, and muscle aches. She states that when she coughs very hard she gags and spits up phlegm. She also reports decreased PO intake.    In the ED, she was given 2mg lorazepam x1 for CIWA 21, tremulous and agitated. Tylenol for T101. CBC remarkable for WBC 27, H/H 11.7/37.2, plt 103. CMP remarkable for CO2 12, anion gap 31, AST 70, ALT 51. Serum alcohol level 165. Lactic acid pending. Patient given 1L LR and 1x vanc/zosyn in ED. Admitted to medicine for mangement of alcohol withdrawal, workup and management of possible sepsis.            Overview/Hospital Course:  Patient admitted to  for management of etoh withdrawal, concern for sepsis as well as starvation/etoh acidosis. Patient found to have AG of 31 and Bicarb of 12 with BHB of 7 which resolved with D5 1/2. Patient also given IV thiamine. Concern for refeeding syndrome given phos<1, hypoK and Mag with aggressive repletion. Concern for withdrawal and patient was started on valium taper as well as PRN ativan. Broad spectrum abx were started with no clear sepsis source and deescalated to " cefepime, abx now d/c. Patient has initiated discussion about etoh or depression with psychiatry, however attributes symptoms to ongoing migraines and concern for dx of DM2.       Interval History: Pt c/o headache overnight and was given toradol 30 mg IV NS 500cc bolus. States improvement in HA, no complaints. Had 5 episodes of dark brown diarrhea yesterday, but denies fevers and chills. She remains afebrile and VSS.   Will taper valium to 10 mg.     Discussed interest in seeing psychiatry outpatient.     Review of Systems   Constitutional:  Negative for chills and fever.   HENT:  Negative for congestion and facial swelling.    Eyes:  Negative for visual disturbance.   Respiratory:  Negative for cough and shortness of breath.    Cardiovascular:  Negative for chest pain and leg swelling.   Gastrointestinal:  Positive for diarrhea. Negative for nausea and vomiting.   Genitourinary:  Negative for difficulty urinating and dysuria.   Musculoskeletal:  Negative for arthralgias.   Skin:  Negative for rash.   Neurological:  Positive for headaches.   Psychiatric/Behavioral:  Negative for agitation.    Objective:     Vital Signs (Most Recent):  Temp: 98.4 °F (36.9 °C) (10/01/22 0418)  Pulse: 100 (10/01/22 0743)  Resp: 18 (10/01/22 0418)  BP: (!) 113/59 (10/01/22 0418)  SpO2: 97 % (10/01/22 0534)   Vital Signs (24h Range):  Temp:  [96.4 °F (35.8 °C)-98.7 °F (37.1 °C)] 98.4 °F (36.9 °C)  Pulse:  [] 100  Resp:  [17-22] 18  SpO2:  [94 %-97 %] 97 %  BP: (108-159)/(59-78) 113/59     Weight: 86.2 kg (190 lb)  Body mass index is 30.67 kg/m².  No intake or output data in the 24 hours ending 10/01/22 0838   Physical Exam  Constitutional:       General: She is not in acute distress.  HENT:      Head: Normocephalic and atraumatic.      Mouth/Throat:      Mouth: Mucous membranes are moist.      Pharynx: Oropharynx is clear.   Eyes:      General: No scleral icterus.     Extraocular Movements: Extraocular movements intact.       Conjunctiva/sclera: Conjunctivae normal.   Cardiovascular:      Rate and Rhythm: Regular rhythm. Tachycardia present.      Heart sounds: Normal heart sounds.   Pulmonary:      Effort: Pulmonary effort is normal.      Breath sounds: Normal breath sounds. No wheezing or rales.      Comments: 3L NC  Abdominal:      General: Abdomen is flat. Bowel sounds are normal.      Palpations: Abdomen is soft.   Musculoskeletal:         General: Normal range of motion.      Cervical back: Normal range of motion.      Right lower leg: No edema.      Left lower leg: No edema.   Skin:     General: Skin is warm and dry.   Neurological:      General: No focal deficit present.      Mental Status: She is alert.      Comments: tremulous   Psychiatric:         Mood and Affect: Mood normal.       Significant Labs: All pertinent labs within the past 24 hours have been reviewed.  BMP:   Recent Labs   Lab 10/01/22  0412   *   *   K 3.7   CL 95   CO2 30*   BUN 3*   CREATININE 0.8   CALCIUM 8.1*   MG 1.7     CBC:   Recent Labs   Lab 09/30/22  0230 10/01/22  0412   WBC 6.71 5.23   HGB 10.0* 9.1*   HCT 31.4* 30.0*   PLT 41* 65*     Magnesium:   Recent Labs   Lab 09/30/22  0230 10/01/22  0412   MG 1.9 1.7       Significant Imaging: I have reviewed all pertinent imaging results/findings within the past 24 hours.      Assessment/Plan:      * Alcohol withdrawal  62 y.o. F with history of alcohol abuse presents with symptoms of withdrawal (tremulous, agitated) after 3 drinks 9/26 morning. Patient has hx previous hospitalizations for withdrawal. CIWA 21 in ED, given 2mg lorazepam IV.     -valium taper - started on 10mg q6h, now will be 10mg BID.  Patient with serum ethanol 165 and showing signs of withdrawal.   -[facility] protocol  -lorazepam 2mg IV PRN   -seizure precautions  -aspiration precautions      Migraine  - outpatient referral to neurology      Refeeding syndrome  Phos <1, Mg 1.3 and K low as well concerning for refeeding in setting of  D5 given for starvation ketosis. Patient reports not eating for some time prior to admission.     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 WBC decreased and patient meeting criteria for MBCL per heme/onc clinic note 08/2022. Not on treatment. Patient with WBC 27 in ED 9/26, ddx includes sepsis vs CLL.       Chronic hypoxemic respiratory failure  Patient with Hypoxic Respiratory failure which is Chronic.  she is on home oxygen at 3 LPM. Supplemental oxygen was provided and noted-  .   Signs/symptoms of respiratory failure include- increased work of breathing. Contributing diagnoses includes - COPD Labs and images were reviewed. Patient Has not had a recent ABG. Will treat underlying causes and adjust management of respiratory failure as follows-     -continue 3L NC    Type 2 diabetes mellitus with hyperglycemia  Patient's FSGs are controlled on current medication regimen.  Last A1c reviewed-   Lab Results   Component Value Date    HGBA1C 5.1 09/27/2022     Most recent fingerstick glucose reviewed-   Recent Labs   Lab 09/28/22  0041 09/28/22  0715 09/28/22  1110 09/28/22  1150   POCTGLUCOSE 224* 186* 224* 243*     Current correctional scale  Low  May adjust anti-hyperglycemic dose as follows-   Antihyperglycemics (From admission, onward)    Start     Stop Route Frequency Ordered    09/28/22 2100  insulin detemir U-100 pen 7 Units         -- SubQ Nightly 09/28/22 1617    09/27/22 0248  insulin aspart U-100 pen 0-5 Units         -- SubQ Before meals & nightly PRN 09/27/22 0149        Hold Oral hypoglycemics while patient is in the hospital.    Detemir 7U  LDSSI    Hypothyroidism  Continue home synthroid      COPD (chronic obstructive pulmonary disease)  On 3L NC home O2      Depression with anxiety  Continue home  duloxetine and aripiprazole. Patient reports ongoing uncontrolled depression but denies any SI currently. Reports this contributes to her lack of appetite. Defer psych evaluation at this time but will re approach topic with patient closer to DC  - resumed home trazodone 300 mg QD      Severe sepsis  This patient does not have evidence of infective focus  My overall impression is sepsis. Vital signs were reviewed and noted in progress note.  Antibiotics given-   Antibiotics (From admission, onward)    None        Cultures were taken-   Microbiology Results (last 7 days)     Procedure Component Value Units Date/Time    Blood culture x two cultures. Draw prior to antibiotics. [[id number]] Collected: 09/26/22 2145    Order Status: Completed Specimen: Blood from Peripheral, Hand, Left Updated: 09/29/22 2312     Blood Culture, Routine No Growth to date      No Growth to date      No Growth to date      No Growth to date    Narrative:      Aerobic and anaerobic    Blood culture x two cultures. Draw prior to antibiotics. [[id number]] Collected: 09/26/22 2146    Order Status: Completed Specimen: Blood from Peripheral, Forearm, Right Updated: 09/29/22 2312     Blood Culture, Routine No Growth to date      No Growth to date      No Growth to date      No Growth to date    Narrative:      Aerobic and anaerobic    Clostridium difficile EIA [[id number]]     Order Status: Canceled Specimen: Stool     Urine culture [[id number]] Collected: 09/27/22 0430    Order Status: Completed Specimen: Urine Updated: 09/28/22 1454     Urine Culture, Routine No significant growth    Narrative:      Specimen Source->Urine    Influenza A & B by Molecular [[id number]] Collected: 09/27/22 0421    Order Status: Completed Specimen: Nasopharyngeal Swab Updated: 09/27/22 0552     Influenza A, Molecular Negative     Influenza B, Molecular Negative     Flu A & B Source Nasal swab        Latest lactate reviewed, they are-  No results for input(s): LACTATE in the  last 72 hours.    Source- undetermined     Source control Achieved by- vanc/cefepime    Patient with T101, tachycardia to 130s, and WBC 27 in ED. Given 1x vanc/zosyn in ED for broad spectrum coverage, 1L LR fluid resuscitation. CXR with no acute abnormality, UA pending. Patient reports flu-like symptoms with increased cough/SOB, muscle aches, and chills.  -vanc/cefepime for broad spectrum coverage  -1L LR bolus added  - Deescalate to cefepime given negative cultures  - 9/29, discontinue all antibiotics as source of infection/fever unclear      Essential hypertension  Holding home BP meds due to possible sepsis      Alcohol use disorder, severe, dependence  Patient in alcohol withdrawal, long-standing history of alcohol dependence, previous hospitalizations for withdrawal    -high-dose thiamine to prevent wernicke encephalopathy  -management of withdrawal        VTE Risk Mitigation (From admission, onward)         Ordered     IP VTE HIGH RISK PATIENT  Once         09/27/22 0149     Place sequential compression device  Until discontinued         09/27/22 0149                Discharge Planning   BECCA: 10/2/2022     Code Status: Full Code   Is the patient medically ready for discharge?: No    Reason for patient still in hospital (select all that apply): Patient trending condition  Discharge Plan A: Home with family                  Lucille Gonzáles MD  Department of Hospital Medicine   [facility]

## 2022-10-01 NOTE — PROGRESS NOTES
ADDICTION CONSULT FOLLOW UP VISIT     DEPARTMENT:  Psychiatry  SITE: [facility], [address]    DATE OF ADMISSION: 9/26/2022  8:45 PM  LENGTH OF STAY: 4 days    EXAMINING PRACTITIONER: Levar Pantoja      SUBJECTIVE:     HISTORY    Patient Name: Earl Abdul  YOB: 1960    CHIEF COMPLAINT   Earl Abdul is a 62 y.o. female who is being seen today for a follow up visit by the addiction psychiatry consult service.  Earl Abdul presents with the chief complaint of: substance use/withdrawal    HPI & PSYCHIATRIC ROS    Pt states that she came to the hospital for severe alcohoil withdrawal and headache. Pt has been drinking liquor daily for a while and wants to stop. Pt did not give a reason as to why. Pt has history of compliacted withdarwals in the past with most often getting hallucinations. Pt had hallucination of a monkey in her room this AM. Pt did seize 5-6 years ago from alcohol withdrawal. Pt feels nauseated and vomitus. Pt also very shaky though does have baseline tremor per  and pt. Discussed and pt's tremors are more severe than what she has at home. Note pt shaky throughout finger to touch. PT's tonuge not shaky on exam. Pt states that overall she feels like she is getting better and she wants to leave the hospital soon. Discussed and pt wants to leave because she thinks she does notneed to be here. Motivational interviewing to stay unsuccessful and pt states she will talk to her primary team. Pt denies any opther substance use. Pt confirms history as outlined below. Pt does endorse worsening depression and anxiety that she recieves trazodone cymbalta and abilfiy from her PCP. Pt does endorse issues with sleep and appetite though states these are starting to improve.  Interview ended when pt needed to go to restroom.    Interim Hx: Patient resting in bed comfortably today. Received PRN ativan 2mg at 0037. She states that she is not having a good day today because  "she took the trazodone with melatonin together and has been feeling a little confused with HA as a result. She is AOx4. She does have some nausea, but feels that it has been improving and voices that today is the "first day eating in a long time." Discussed with patient plan for outpatient psychiatry follow-up, which she desires. She denies any tremors, per nurse at bedside -  notes that she does have a chronic tremor that she is able to control. Does not appear to impacting any of her ADL's.    PFSH: The patient's past medical history has been reviewed and updated as appropriate within the electronic medical record system.    ROS:  All other review of systems are negative.       OBJECTIVE:     EXAMINATION    Vitals:    10/01/22 0743 10/01/22 1126 10/01/22 1507 10/01/22 1534   BP:  133/60 129/62    BP Location:  Right arm Right arm    Patient Position:  Lying Lying    Pulse: 100 95 94 97   Resp:  17 18    Temp:  98.3 °F (36.8 °C) 98.5 °F (36.9 °C)    TempSrc:  Oral Oral    SpO2:  (!) 94% 95% 95%   Weight:           CONSTITUTIONAL  MENTAL STATUS EXAMINATION  General Appearance: appropriately dressed, adequately groomed  Behavior: cooperative, appropriate eye contact, under good behavioral control  Movements and Motor Activity: no abnormal involuntary movements noted, no psychomotor agitation or retardation  Gait and Station: intact, normal gait and station, ambulates without assistance  Speech: normal rate/rhythm/volume/tone, conversational, spontaneous, coherent  Language: fluent English, repeats words/phrases, no word-finding difficulties noted  Mood: "okay"  Affect: euthymic, reactive, appropriate  Thought Process: linear, goal-directed, organized, logical  Associations: intact, no loosening of associations  Thought Content: no suicidal ideation, no homicidal ideation, no paranoid ideation, no ideas of reference, no evidence of delusions or psychosis  Perceptions: no auditory or visual " hallucinations  Sensorium: alert and awake. I-CAM negative..  Orientation: oriented fully to person, place, time, and situation  Recent and Remote Memory: recent memory intact, remote memory intact, no impairments noted  Attention and Concentration: intact, attentive to conversation, not distractible  Fund of Knowledge: intact, aware of current events, vocabulary appropriate  Insight: intact, demonstrates awareness of situation  Judgment: intact, behavior is adequate/appropriate to the circumstances    ASSESSMENT:     DIAGNOSES & PROBLEMS    Alcohol use disorder, current, severe, with complicated withdrawal  Cannabis use disorder  Depressive disorder, unspecified  General anxiety disorder    Patient Active Problem List   Diagnosis    Anxiety and Depression    Alcohol use disorder, severe, dependence    Alcohol withdrawal    Essential hypertension    Fibromyalgia    Tobacco abuse    Cannabis abuse, in remission    Sarcoidosis    History of Clostridium difficile colitis    Diarrhea    Dehydration    History of substance abuse    Severe sepsis    Pyelonephritis due to Escherichia coli    New onset seizure    Posterior reversible encephalopathy syndrome    Depression with anxiety    Erythema nodosum    History of sarcoidosis    Hyperlipidemia    Ramírez's syndrome    Degenerative joint disease (DJD) of lumbar spine    Decreased ROM of lumbar spine    Difficulty walking    VINICIO (obstructive sleep apnea)    At risk for lymphedema    Malignant neoplasm of central portion of right breast in female, estrogen receptor positive    COPD (chronic obstructive pulmonary disease)    Incontinence of feces    Low serum vitamin B12    Anemia    Urge incontinence of urine    Hypothyroidism    Type 2 diabetes mellitus with hyperglycemia    Obesity (BMI 30.0-34.9)    Fecal incontinence    Mixed incontinence    Pelvic floor tension    Chronic hypoxemic respiratory failure    COVID-19    Hypoxia    Shortness of breath    Alcoholic  ketoacidosis    E coli bacteremia    Lymphocytosis    Migraine without aura and with status migrainosus, not intractable    OAB (overactive bladder)    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 Monitor for delirium tremens and seizures. Recommend/agree with benzo taper:  - On valium 10mg BID, can continue taper  - Frequent assessment with the CIWA-Ar is the standard of care and the hallmark of clinical practice  - Clinical [place] Withdrawal Assessment of Alcohol Scale (CIWA-Ar)  If CIWA is <8 and vital signs are stable, no PRN medication is required. Repeat vital signs q4 and the CIWA q8.  If CIWA is between 8 and 15, give Lorazepam 2 mg PO/IM q4 PRN and complete vital signs q2 and the CIWA q4.  If CIWA is ?15 or DBP ?110 mmHg, give Lorazepam 2 mg PO/IM q1 until patient has a CIWA of <15 or DBP <110 mmHg. CIWA and vital signs checked q1 until patients CIWA is <15 and DBP <110 mmHg  Call MD for for SBP >180, DBP >120, or HR >110 or if patient requires ?6 mg of lorazepam in 3 hours.  - Vitals q4hr; Ativan 2mg PO/IM q4hr PRN, for CIWA >8 or if 2 criteria are met: Systolic BP>160, Diastolic BP>100 or HR>110  - Vitamin supplementation - Thiamine 100 mg daily, Folate 1 mg daily, MVI daily, and Vitamin B12  - If not already ordered: CBC, CMP, liver panel (prior to initiating taper), B12, folate, iron panel, thiamine level.  - Seizure precautions. Seizures  generally occur between 12-48 hours after alcohol cessation. Monitor for hypoglycemia, hypocalcemia, and hypomagnesemia as these can predispose to seizure.  - Monitor for AMS, ataxia and nystagmus as these can be signs of Wernicke's Encephalopathy.  -Resources for cessation placed in discharge tab     Depression and anxiety  - continue cymbalta 60mg QHS  - continue abilify 5mg daily  - continue trazodone 300mg QHS; would recommend decreased dosage of PRN melatonin  - note Rx propranolol, and synthroid  - place ambulatory referral to psych    In cases of emergency, daily coverage provided by Acute/ER Psych MD, NP, or SW, with contact numbers located in [facility] On Call Schedule      MD Levar   [facility] PGY2

## 2022-10-01 NOTE — PLAN OF CARE
Problem: Adult Inpatient Plan of Care  Goal: Plan of Care Review  Outcome: Ongoing, Progressing  Goal: Patient-Specific Goal (Individualized)  Outcome: Ongoing, Progressing  Goal: Absence of Hospital-Acquired Illness or Injury  Outcome: Ongoing, Progressing  Goal: Optimal Comfort and Wellbeing  Outcome: Ongoing, Progressing  Goal: Readiness for Transition of Care  Outcome: Ongoing, Progressing     Problem: Diabetes Comorbidity  Goal: Blood Glucose Level Within Targeted Range  Outcome: Ongoing, Progressing     Problem: Adjustment to Illness (Sepsis/Septic Shock)  Goal: Optimal Coping  Outcome: Ongoing, Progressing     Problem: Bleeding (Sepsis/Septic Shock)  Goal: Absence of Bleeding  Outcome: Ongoing, Progressing     Problem: Glycemic Control Impaired (Sepsis/Septic Shock)  Goal: Blood Glucose Level Within Desired Range  Outcome: Ongoing, Progressing     Problem: Infection Progression (Sepsis/Septic Shock)  Goal: Absence of Infection Signs and Symptoms  Outcome: Ongoing, Progressing     Problem: Nutrition Impaired (Sepsis/Septic Shock)  Goal: Optimal Nutrition Intake  Outcome: Ongoing, Progressing     Problem: Impaired Wound Healing  Goal: Optimal Wound Healing  Outcome: Ongoing, Progressing     Problem: Skin Injury Risk Increased  Goal: Skin Health and Integrity  Outcome: Ongoing, Progressing     Problem: Fall Injury Risk  Goal: Absence of Fall and Fall-Related Injury  Outcome: Ongoing, Progressing

## 2022-10-01 NOTE — ASSESSMENT & PLAN NOTE
62 y.o. F with history of alcohol abuse presents with symptoms of withdrawal (tremulous, agitated) after 3 drinks 9/26 morning. Patient has hx previous hospitalizations for withdrawal. CIWA 21 in ED, given 2mg lorazepam IV.     -valium taper - started on 10mg q6h, now will be 10mg BID.  Patient with serum ethanol 165 and showing signs of withdrawal.   -[facility] protocol  -lorazepam 2mg IV PRN   -seizure precautions  -aspiration precautions

## 2022-10-01 NOTE — PLAN OF CARE
Problem: Adult Inpatient Plan of Care  Goal: Plan of Care Review  Outcome: Ongoing, Progressing     Problem: Adult Inpatient Plan of Care  Goal: Patient-Specific Goal (Individualized)  Outcome: Ongoing, Progressing     Problem: Adult Inpatient Plan of Care  Goal: Absence of Hospital-Acquired Illness or Injury  Outcome: Ongoing, Progressing     POC reviewed with pt. Answered all questions. A&Ox3. Tele in place. Telesitter in place. Bed in lowest position, call light within reach, non skid socks on, bed alarm set, instructed to call staff for needs with call light - needs constant reminders to call before getting out of bed. Pt verbalizes understanding to use call light.

## 2022-10-01 NOTE — SUBJECTIVE & OBJECTIVE
Interval History: Pt c/o headache overnight and was given toradol 30 mg IV NS 500cc bolus. States improvement in HA, no complaints. Had 5 episodes of dark brown diarrhea yesterday, but denies fevers and chills. She remains afebrile and VSS.   Will taper valium to 10 mg.     Discussed interest in seeing psychiatry outpatient.     Review of Systems   Constitutional:  Negative for chills and fever.   HENT:  Negative for congestion and facial swelling.    Eyes:  Negative for visual disturbance.   Respiratory:  Negative for cough and shortness of breath.    Cardiovascular:  Negative for chest pain and leg swelling.   Gastrointestinal:  Positive for diarrhea. Negative for nausea and vomiting.   Genitourinary:  Negative for difficulty urinating and dysuria.   Musculoskeletal:  Negative for arthralgias.   Skin:  Negative for rash.   Neurological:  Positive for headaches.   Psychiatric/Behavioral:  Negative for agitation.    Objective:     Vital Signs (Most Recent):  Temp: 98.4 °F (36.9 °C) (10/01/22 0418)  Pulse: 100 (10/01/22 0743)  Resp: 18 (10/01/22 0418)  BP: (!) 113/59 (10/01/22 0418)  SpO2: 97 % (10/01/22 0534)   Vital Signs (24h Range):  Temp:  [96.4 °F (35.8 °C)-98.7 °F (37.1 °C)] 98.4 °F (36.9 °C)  Pulse:  [] 100  Resp:  [17-22] 18  SpO2:  [94 %-97 %] 97 %  BP: (108-159)/(59-78) 113/59     Weight: 86.2 kg (190 lb)  Body mass index is 30.67 kg/m².  No intake or output data in the 24 hours ending 10/01/22 0838   Physical Exam  Constitutional:       General: She is not in acute distress.  HENT:      Head: Normocephalic and atraumatic.      Mouth/Throat:      Mouth: Mucous membranes are moist.      Pharynx: Oropharynx is clear.   Eyes:      General: No scleral icterus.     Extraocular Movements: Extraocular movements intact.      Conjunctiva/sclera: Conjunctivae normal.   Cardiovascular:      Rate and Rhythm: Regular rhythm. Tachycardia present.      Heart sounds: Normal heart sounds.   Pulmonary:      Effort:  Pulmonary effort is normal.      Breath sounds: Normal breath sounds. No wheezing or rales.      Comments: 3L NC  Abdominal:      General: Abdomen is flat. Bowel sounds are normal.      Palpations: Abdomen is soft.   Musculoskeletal:         General: Normal range of motion.      Cervical back: Normal range of motion.      Right lower leg: No edema.      Left lower leg: No edema.   Skin:     General: Skin is warm and dry.   Neurological:      General: No focal deficit present.      Mental Status: She is alert.      Comments: tremulous   Psychiatric:         Mood and Affect: Mood normal.       Significant Labs: All pertinent labs within the past 24 hours have been reviewed.  BMP:   Recent Labs   Lab 10/01/22  0412   *   *   K 3.7   CL 95   CO2 30*   BUN 3*   CREATININE 0.8   CALCIUM 8.1*   MG 1.7     CBC:   Recent Labs   Lab 09/30/22  0230 10/01/22  0412   WBC 6.71 5.23   HGB 10.0* 9.1*   HCT 31.4* 30.0*   PLT 41* 65*     Magnesium:   Recent Labs   Lab 09/30/22  0230 10/01/22  0412   MG 1.9 1.7       Significant Imaging: I have reviewed all pertinent imaging results/findings within the past 24 hours.

## 2022-10-01 NOTE — PLAN OF CARE
61 y/o F with CLL/MBCL, sarcoidosis, T2DM, COPD on QHS O2, HLD, HTN, and migraines presented on 9/26 with Signs of alcohol withdrawal and Flu-like symptoms. Patient reported that her last drink was on 9/26. She was found to be tremulous in the ED and was given Ativan and IVF and was started on broad spectrum ABX coverage for possible sepsis. Blood cultures remain NGTD. Empiric Vanc/Cefepime was initiated, but de-escalated with Vanc discontinued on 9/28. Cefepime discontinued on 9/29. hospital course complicated by electrolyte derangements secondary to malnutrition that were aggressively repleted. Additionally, Patient reported that since her diagnosis of diabetes (prior A1c 6.6-> now 5.1) She has felt uncomfortable about what she should eat, despite diabetes education, and so she doesn't eat adding to her overall depression and continued drinking. Addiction psych consulted on 9/29.    # Alcohol withdrawal - CIWA in place with schedule valium taper - set to complete taper tomorrow 10/2. Thiamine and Folate in place. Addiction Psych following and resources were given to her for AA and helping her quit. Will need follow up with Psych OP.  # MDD with behavioral changes - continue home Abilify and Cymbalta but held trazadone per Psych recommendations  # T2DM - Basal nightly Levemir + SSI with goal BG while inpatient 140-180. Will refer patient to Erik Navarrete NP upon discharge to follow up for her Diabetes per patient request.  # COPD - Continue home LABA/LAMA inhalers and O2 QHS  # Hypothyroidism - continue home Synthroid  VTE PPX: Holding 2/2 thrombocytopenia  Dispo: Home

## 2022-10-02 VITALS
OXYGEN SATURATION: 93 % | BODY MASS INDEX: 30.67 KG/M2 | SYSTOLIC BLOOD PRESSURE: 125 MMHG | TEMPERATURE: 99 F | DIASTOLIC BLOOD PRESSURE: 61 MMHG | WEIGHT: 190 LBS | HEART RATE: 79 BPM | RESPIRATION RATE: 18 BRPM

## 2022-10-02 LAB
ALBUMIN SERPL BCP-MCNC: 3 G/DL (ref 3.5–5.2)
ALP SERPL-CCNC: 66 U/L (ref 55–135)
ALT SERPL W/O P-5'-P-CCNC: 50 U/L (ref 10–44)
ANION GAP SERPL CALC-SCNC: 12 MMOL/L (ref 8–16)
ANION GAP SERPL CALC-SCNC: 9 MMOL/L (ref 8–16)
AST SERPL-CCNC: 35 U/L (ref 10–40)
BASOPHILS # BLD AUTO: 0.01 K/UL (ref 0–0.2)
BASOPHILS NFR BLD: 0.2 % (ref 0–1.9)
BILIRUB SERPL-MCNC: 0.3 MG/DL (ref 0.1–1)
BUN SERPL-MCNC: 6 MG/DL (ref 8–23)
BUN SERPL-MCNC: 7 MG/DL (ref 8–23)
CALCIUM SERPL-MCNC: 8.6 MG/DL (ref 8.7–10.5)
CALCIUM SERPL-MCNC: 8.8 MG/DL (ref 8.7–10.5)
CHLORIDE SERPL-SCNC: 102 MMOL/L (ref 95–110)
CHLORIDE SERPL-SCNC: 103 MMOL/L (ref 95–110)
CO2 SERPL-SCNC: 24 MMOL/L (ref 23–29)
CO2 SERPL-SCNC: 25 MMOL/L (ref 23–29)
CREAT SERPL-MCNC: 0.7 MG/DL (ref 0.5–1.4)
CREAT SERPL-MCNC: 0.8 MG/DL (ref 0.5–1.4)
DIFFERENTIAL METHOD: ABNORMAL
EOSINOPHIL # BLD AUTO: 0 K/UL (ref 0–0.5)
EOSINOPHIL NFR BLD: 0.8 % (ref 0–8)
ERYTHROCYTE [DISTWIDTH] IN BLOOD BY AUTOMATED COUNT: 17.7 % (ref 11.5–14.5)
EST. GFR  (NO RACE VARIABLE): >60 ML/MIN/1.73 M^2
EST. GFR  (NO RACE VARIABLE): >60 ML/MIN/1.73 M^2
GLUCOSE SERPL-MCNC: 134 MG/DL (ref 70–110)
GLUCOSE SERPL-MCNC: 253 MG/DL (ref 70–110)
HCT VFR BLD AUTO: 29.7 % (ref 37–48.5)
HGB BLD-MCNC: 9.3 G/DL (ref 12–16)
HYPOCHROMIA BLD QL SMEAR: ABNORMAL
IMM GRANULOCYTES # BLD AUTO: 0.01 K/UL (ref 0–0.04)
IMM GRANULOCYTES NFR BLD AUTO: 0.2 % (ref 0–0.5)
LYMPHOCYTES # BLD AUTO: 2.7 K/UL (ref 1–4.8)
LYMPHOCYTES NFR BLD: 57 % (ref 18–48)
MAGNESIUM SERPL-MCNC: 1.6 MG/DL (ref 1.6–2.6)
MCH RBC QN AUTO: 30.4 PG (ref 27–31)
MCHC RBC AUTO-ENTMCNC: 31.3 G/DL (ref 32–36)
MCV RBC AUTO: 97 FL (ref 82–98)
MONOCYTES # BLD AUTO: 0.6 K/UL (ref 0.3–1)
MONOCYTES NFR BLD: 13.4 % (ref 4–15)
NEUTROPHILS # BLD AUTO: 1.4 K/UL (ref 1.8–7.7)
NEUTROPHILS NFR BLD: 28.4 % (ref 38–73)
NRBC BLD-RTO: 0 /100 WBC
PHOSPHATE SERPL-MCNC: 3.6 MG/DL (ref 2.7–4.5)
PLATELET # BLD AUTO: 69 K/UL (ref 150–450)
PLATELET BLD QL SMEAR: ABNORMAL
PMV BLD AUTO: 10.6 FL (ref 9.2–12.9)
POCT GLUCOSE: 118 MG/DL (ref 70–110)
POCT GLUCOSE: 175 MG/DL (ref 70–110)
POCT GLUCOSE: 220 MG/DL (ref 70–110)
POTASSIUM SERPL-SCNC: 3.7 MMOL/L (ref 3.5–5.1)
POTASSIUM SERPL-SCNC: 3.9 MMOL/L (ref 3.5–5.1)
PROT SERPL-MCNC: 5.3 G/DL (ref 6–8.4)
RBC # BLD AUTO: 3.06 M/UL (ref 4–5.4)
SMUDGE CELLS BLD QL SMEAR: PRESENT
SODIUM SERPL-SCNC: 136 MMOL/L (ref 136–145)
SODIUM SERPL-SCNC: 139 MMOL/L (ref 136–145)
WBC # BLD AUTO: 4.77 K/UL (ref 3.9–12.7)

## 2022-10-02 PROCEDURE — 25000003 PHARM REV CODE 250

## 2022-10-02 PROCEDURE — 25000003 PHARM REV CODE 250: Performed by: STUDENT IN AN ORGANIZED HEALTH CARE EDUCATION/TRAINING PROGRAM

## 2022-10-02 PROCEDURE — 99239 HOSP IP/OBS DSCHRG MGMT >30: CPT | Mod: ,,, | Performed by: STUDENT IN AN ORGANIZED HEALTH CARE EDUCATION/TRAINING PROGRAM

## 2022-10-02 PROCEDURE — 80048 BASIC METABOLIC PNL TOTAL CA: CPT | Performed by: STUDENT IN AN ORGANIZED HEALTH CARE EDUCATION/TRAINING PROGRAM

## 2022-10-02 PROCEDURE — 80053 COMPREHEN METABOLIC PANEL: CPT

## 2022-10-02 PROCEDURE — 36415 COLL VENOUS BLD VENIPUNCTURE: CPT | Performed by: STUDENT IN AN ORGANIZED HEALTH CARE EDUCATION/TRAINING PROGRAM

## 2022-10-02 PROCEDURE — 36415 COLL VENOUS BLD VENIPUNCTURE: CPT

## 2022-10-02 PROCEDURE — 83735 ASSAY OF MAGNESIUM: CPT

## 2022-10-02 PROCEDURE — 99239 PR HOSPITAL DISCHARGE DAY,>30 MIN: ICD-10-PCS | Mod: ,,, | Performed by: STUDENT IN AN ORGANIZED HEALTH CARE EDUCATION/TRAINING PROGRAM

## 2022-10-02 PROCEDURE — 85025 COMPLETE CBC W/AUTO DIFF WBC: CPT

## 2022-10-02 PROCEDURE — 84100 ASSAY OF PHOSPHORUS: CPT | Performed by: STUDENT IN AN ORGANIZED HEALTH CARE EDUCATION/TRAINING PROGRAM

## 2022-10-02 RX ORDER — HYDROXYZINE HYDROCHLORIDE 10 MG/1
25 TABLET, FILM COATED ORAL NIGHTLY PRN
Qty: 21 TABLET | Refills: 0 | Status: SHIPPED | OUTPATIENT
Start: 2022-10-02 | End: 2022-10-09

## 2022-10-02 RX ORDER — HYDROXYZINE HYDROCHLORIDE 10 MG/1
10 TABLET, FILM COATED ORAL 3 TIMES DAILY PRN
Qty: 21 TABLET | Refills: 0 | Status: SHIPPED | OUTPATIENT
Start: 2022-10-02 | End: 2022-10-02 | Stop reason: SDUPTHER

## 2022-10-02 RX ADMIN — FOLIC ACID 1 MG: 1 TABLET ORAL at 08:10

## 2022-10-02 RX ADMIN — LEVOTHYROXINE SODIUM 50 MCG: 50 TABLET ORAL at 05:10

## 2022-10-02 RX ADMIN — GABAPENTIN 600 MG: 300 CAPSULE ORAL at 08:10

## 2022-10-02 RX ADMIN — Medication 100 MG: at 08:10

## 2022-10-02 RX ADMIN — DIAZEPAM 5 MG: 5 TABLET ORAL at 08:10

## 2022-10-02 RX ADMIN — ACETAMINOPHEN 650 MG: 325 TABLET ORAL at 06:10

## 2022-10-02 RX ADMIN — ARIPIPRAZOLE 5 MG: 5 TABLET ORAL at 08:10

## 2022-10-02 RX ADMIN — TIOTROPIUM BROMIDE INHALATION SPRAY 2 PUFF: 3.12 SPRAY, METERED RESPIRATORY (INHALATION) at 08:10

## 2022-10-02 RX ADMIN — SALMETEROL XINAFOATE 1 PUFF: 50 POWDER, METERED ORAL; RESPIRATORY (INHALATION) at 08:10

## 2022-10-02 NOTE — DISCHARGE SUMMARY
"[facility] - Telemetry [facility] Medicine  Discharge Summary      Patient Name: Earl Abdul  MRN: [id number]  Patient Class: IP- Inpatient  Admission Date: 9/26/2022  Hospital Length of Stay: 5 days  Discharge Date and Time:  10/02/2022 1:16 PM  Attending Physician: No att. providers found   Discharging Provider: Lucille Gonzáles MD  Primary Care Provider: Andrew Rodriguez MD  [facility] Medicine Team: [facility] HOSP MED 5 Lucille Gonzáles MD    HPI:   61 y/o F with a PMHx of CLL/MBCL, sarcoidosis, T2DM, COPD on QHS O2, HLD, HTN and migraines presents to the ED with alcohol withdrawal and flu-like symptoms. Patient reports that she had a bad week and had been drinking, last drink around lunchtime 9/26. She had 3 drinks 9/26 prior to presentation. She is tremulous and reports a headache, but states that it is similar to her migraines. She reports that her  recently had a "bad cold," and she has had similar symptoms over about 1 week including increased cough and SOB, chills, and muscle aches. She states that when she coughs very hard she gags and spits up phlegm. She also reports decreased PO intake.    In the ED, she was given 2mg lorazepam x1 for CIWA 21, tremulous and agitated. Tylenol for T101. CBC remarkable for WBC 27, H/H 11.7/37.2, plt 103. CMP remarkable for CO2 12, anion gap 31, AST 70, ALT 51. Serum alcohol level 165. Lactic acid pending. Patient given 1L LR and 1x vanc/zosyn in ED. Admitted to medicine for mangement of alcohol withdrawal, workup and management of possible sepsis.            * No surgery found *      Hospital Course:   Patient admitted to  for management of etoh withdrawal, concern for sepsis as well as starvation/etoh acidosis. Patient found to have AG of 31 and Bicarb of 12 with BHB of 7 which resolved with D5 1/2. Patient also given IV thiamine. Concern for refeeding syndrome given phos<1, hypoK and Mag with aggressive repletion. Concern for withdrawal and patient was started " on valium taper as well as PRN ativan. Broad spectrum abx were started with no clear sepsis source and deescalated to cefepime, abx now d/c. Patient has initiated discussion about etoh or depression with psychiatry, however attributes symptoms to ongoing migraines and concern for dx of DM2.     Vitals:    10/02/22 0843   BP: 125/61   Pulse: 79   Resp: 18   Temp: 98.5 °F (36.9 °C)       Review of Systems   Constitutional:  Negative for chills and fever.   HENT:  Negative for congestion and facial swelling.    Eyes:  Negative for visual disturbance.   Respiratory:  Negative for cough and shortness of breath.    Cardiovascular:  Negative for chest pain and leg swelling.   Gastrointestinal:  Positive for diarrhea. Negative for nausea and vomiting.   Genitourinary:  Negative for difficulty urinating and dysuria.   Musculoskeletal:  Negative for arthralgias.   Skin:  Negative for rash.   Neurological:  Positive for headaches.   Psychiatric/Behavioral:  Negative for agitation.       Physical Exam  Constitutional:       General: She is not in acute distress.  HENT:      Head: Normocephalic and atraumatic.      Mouth/Throat:      Mouth: Mucous membranes are moist.      Pharynx: Oropharynx is clear.   Eyes:      General: No scleral icterus.     Extraocular Movements: Extraocular movements intact.      Conjunctiva/sclera: Conjunctivae normal.   Cardiovascular:      Rate and Rhythm: Regular rhythm. Tachycardia present.      Heart sounds: Normal heart sounds.   Pulmonary:      Effort: Pulmonary effort is normal.      Breath sounds: Normal breath sounds. No wheezing or rales.      Comments: 3L NC  Abdominal:      General: Abdomen is flat. Bowel sounds are normal.      Palpations: Abdomen is soft.   Musculoskeletal:         General: Normal range of motion.      Cervical back: Normal range of motion.      Right lower leg: No edema.      Left lower leg: No edema.   Skin:     General: Skin is warm and dry.   Neurological:       General: No focal deficit present.      Mental Status: She is alert.      Comments: tremulous   Psychiatric:         Mood and Affect: Mood normal.      Goals of Care Treatment Preferences:  Code Status: Full Code      Consults:   Consults (From admission, onward)        Status Ordering Provider     [facility]  Once        Provider:  (Not yet assigned)    Completed ELINA SIDDIQI     Inpatient consult to Psychiatry  Once        Provider:  (Not yet assigned)    Completed SRINIVAS GRAMAJO     Inpatient [facility]ICC team [facility]  Once        Provider:  (Not yet assigned)    Completed ENRIQUETA CHAN new Assessment & Plan notes have been filed under this hospital service since the last note was generated.  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 [D72.820, C91.10] 09/27/2022 Yes    Chronic hypoxemic respiratory failure [J96.11] 01/08/2022 Yes     Chronic    Type 2 diabetes mellitus with hyperglycemia [E11.65] 11/30/2021 Yes    Hypothyroidism [E03.9] 11/30/2021 Yes    COPD (chronic obstructive pulmonary disease) [J44.9] 04/17/2021 Yes    Depression with anxiety [F41.8] 08/16/2017 Yes    Severe sepsis [A41.9, R65.20]  Yes    Alcohol use disorder, severe, dependence [F10.20] 02/07/2014 Yes     Chronic    Essential hypertension [I10] 02/07/2014 Yes    Anxiety and Depression [F32.A] 02/07/2014 Yes      Problems Resolved During this Admission:       Discharged Condition: stable    Disposition: Home or Self Care    Follow Up:   Follow-up Information     Andrew Rodriguez MD Follow up in 1 week(s).    Specialty: Internal Medicine  Contact information:  [address]  [address]  [facility]  [facility] [address]  [phone]             [facility] 4[facility] Follow up in 1 month(s).    Specialty: Psychiatry  Why: Alcohol withdrawal and depression/anxiety  Contact information:  [address]  [facility] [id number] [phone]  Additional information:  [facility] 4th Floor, [address]   Please park in [address] and use [facility] Medical Office elevator           [facility] - Neurology 7th Fl Follow up in 1 month(s).    Specialty: Neurology  Why: Migraines  Contact information:  [address] [facility] [id number] [phone]  Additional information:  [facility] - Main Building, 7th Floor   Please park in [address] and take Clinic elevator           DUY Olvera FNP. Call in 1 week(s).    Specialty: Internal Medicine  Contact information:  [address] [facility] [address]  [phone]                       Patient Instructions:      Ambulatory referral/consult to Neurology   Standing Status: Future   Referral Priority: Routine Referral Type: Consultation   Referral Reason: Specialty Services Required   Requested Specialty: Neurology   Number of Visits Requested: 1     Ambulatory referral/consult to Psychiatry   Standing Status: Future   Referral Priority: Routine Referral Type: Psychiatric   Referral Reason: Specialty Services Required   Requested Specialty: Psychiatry   Number of Visits Requested: 1     Ambulatory referral/consult to Internal Medicine   Standing Status: Future   Referral Priority: Routine Referral Type: Consultation   Referral Reason: Specialty Services Required   Referred to Provider: MATILDE BRITT Requested Specialty: Internal Medicine   Number of Visits Requested: 1       Significant Diagnostic Studies: Labs:   BMP:   Recent Labs   Lab 10/01/22  0412 10/01/22  0903 10/01/22  1932 10/02/22  0314 10/02/22  0753   *   < > 124* 253* 134*   *   < > 134* 139 136   K 3.7   < > 4.3 3.7 3.9    CL 95   < > 100 103 102   CO2 30*   < > 24 24 25   BUN 3*   < > 6* 6* 7*   CREATININE 0.8   < > 0.8 0.8 0.7   CALCIUM 8.1*   < > 9.1 8.6* 8.8   MG 1.7  --   --  1.6  --     < > = values in this interval not displayed.   , CBC   Recent Labs   Lab 10/01/22  0412 10/02/22  0314   WBC 5.23 4.77   HGB 9.1* 9.3*   HCT 30.0* 29.7*   PLT 65* 69*    and All labs within the past 24 hours have been reviewed    Pending Diagnostic Studies:     None         Medications:  Reconciled Home Medications:      Medication List      START taking these medications    folic acid 1 MG tablet  Commonly known as: FOLVITE  Take 1 tablet (1 mg total) by mouth once daily.     hydrOXYzine HCL 10 MG Tab  Commonly known as: ATARAX  Take 3 tablets (30 mg total) by mouth nightly as needed.     thiamine 100 MG tablet  Take 1 tablet (100 mg total) by mouth once daily.        CHANGE how you take these medications    lisinopriL 20 MG tablet  Commonly known as: PRINIVIL,ZESTRIL  Take 1 tablet (20 mg total) by mouth every morning. Hold medications until patient follows with PCP  What changed:   · when to take this  · additional instructions     propranoloL 10 MG tablet  Commonly known as: INDERAL  Take 1 tablet (10 mg total) by mouth 2 (two) times daily. Hold medications until patient follows with PCP  What changed: additional instructions     STIOLTO RESPIMAT 2.5-2.5 mcg/actuation Mist  Generic drug: tiotropium-olodateroL  Inhale 1 puff into the lungs 2 (two) times a day. Controller  What changed:   · when to take this  · reasons to take this     traZODone 300 MG tablet  Commonly known as: DESYREL  Take 1 tablet (300 mg total) by mouth every evening.  What changed: when to take this        CONTINUE taking these medications    acetaminophen 500 MG tablet  Commonly known as: TYLENOL  Take 1,000 mg by mouth every 4 (four) hours as needed for Pain.     anastrozole 1 mg Tab  Commonly known as: ARIMIDEX  Take 1 tablet (1 mg total) by mouth every morning.      ARIPiprazole 5 MG Tab  Commonly known as: ABILIFY  Take 5 mg by mouth once daily.     atorvastatin 10 MG tablet  Commonly known as: LIPITOR  Take 10 mg by mouth every morning.     ATROVENT HFA 17 mcg/actuation inhaler  Generic drug: ipratropium  Inhale 2 puffs into the lungs every 6 (six) hours as needed for Wheezing.     chlorzoxazone 500 mg Tab  Commonly known as: PARAFON FORTE  Take 500 mg by mouth daily as needed (muscle spasms).     DULoxetine 60 MG capsule  Commonly known as: CYMBALTA  Take 60 mg by mouth every evening.     gabapentin 600 MG tablet  Commonly known as: NEURONTIN  Take 1 tablet (600 mg total) by mouth 2 (two) times daily.     levothyroxine 50 MCG tablet  Commonly known as: SYNTHROID  Take 1 tablet (50 mcg total) by mouth before breakfast.     metFORMIN 500 MG ER 24hr tablet  Commonly known as: GLUCOPHAGE-XR  Take 2 tablets (1,000 mg total) by mouth 2 (two) times daily with meals.     multivitamin per tablet  Commonly known as: THERAGRAN  Take 1 tablet by mouth once daily.     ondansetron 4 MG Tbdl  Commonly known as: ZOFRAN-ODT  Take 2 tablets (8 mg total) by mouth every 12 (twelve) hours as needed (Nausea).     pantoprazole 40 MG tablet  Commonly known as: PROTONIX  Take 1 tablet (40 mg total) by mouth once daily.        STOP taking these medications    OZEMPIC 0.25 mg or 0.5 mg(2 mg/1.5 mL) pen injector  Generic drug: semaglutide            Indwelling Lines/Drains at time of discharge:   Lines/Drains/Airways     None                 Time spent on the discharge of patient: 30 minutes         Lucille Gonzáles MD  Department of Hospital Medicine  [facility] - Telemetry Stepdown

## 2022-10-03 NOTE — PLAN OF CARE
[facility] - Telemetry Stepdown  Discharge Final Note    Primary Care Provider: Andrew Rodriguez MD    Expected Discharge Date: 10/2/2022      Future Appointments   Date Time Provider Department Center   10/11/2022  8:30 AM Luis Ely MD [facility]   10/13/2022 10:00 AM Andrew Rodriguez MD [facility]          Final Discharge Note (most recent)       Final Note - 10/03/22 1548          Final Note    Assessment Type Final Discharge Note     Anticipated Discharge Disposition Home or Self Care     Hospital Resources/Appts/Education Provided Appointments scheduled and added to AVS        Post-Acute Status    Post-Acute Authorization Other     Other Status No Post-Acute Service Needs                     Important Message from Medicare             Contact Info       Andrew Rodriguez MD   Specialty: Internal Medicine   Relationship: PCP - General    [facility]  [address]  [facility] [address]   Phone: [phone]       Next Steps: Follow up in 1 week(s)    [facility] 4th Fl   Specialty: Psychiatry    [address]  [place] [address]   Phone: [phone]       Next Steps: Follow up in 1 month(s)    Instructions: Alcohol withdrawal and depression/anxiety    [facility] - Neurology 7th Fl   Specialty: Neurology    [address]  [place] [address]   Phone: [phone]       Next Steps: Follow up in 1 month(s)    Instructions: Migraines    DUY Olvera, FNP   Specialty: Internal Medicine    [address]  [place] [address]   Phone: [phone]       Next Steps: Call in 1 week(s)          Saundra Knight RN  Ext [phone]

## 2022-10-04 ENCOUNTER — PATIENT OUTREACH (OUTPATIENT)
Dept: ADMINISTRATIVE | Facility: CLINIC | Age: 62
End: 2022-10-04
Payer: COMMERCIAL

## 2022-10-04 NOTE — PROGRESS NOTES
C3 nurse attempted to contact Earl Abdul  for a TCC post hospital discharge follow up call. The patient is unable to conduct the call @ this time, endorsing feeling nauseous, weak, c/o HA, not wanting to eat.  She states that she doesn't feel much better than when she was in the hospital.  Attempted to transfer patient to Triage, but she declined after Triage staff responded to writer.  The patient requested a callback.    The patient has a scheduled HOSFU appointment with Andrew Rodriguez MD  on 10/13/2022 @ 10AM. Message sent to Physician staff to consider changed HOSFU date to accommodate C3 guidelines of 5-7 days post-discharge date 10/2/2022.

## 2022-10-05 NOTE — PROGRESS NOTES
C3 nurse spoke with Earl Abdul  for a TCC post hospital discharge follow up call. The patient has a scheduled HOSFU appointment with Andrew Rodriguez MD  on 10/13/2022 @ 1000.

## 2022-10-13 ENCOUNTER — OFFICE VISIT (OUTPATIENT)
Dept: INTERNAL MEDICINE | Facility: CLINIC | Age: 62
End: 2022-10-13
Attending: INTERNAL MEDICINE
Payer: COMMERCIAL

## 2022-10-13 VITALS
WEIGHT: 190.06 LBS | HEART RATE: 94 BPM | BODY MASS INDEX: 30.54 KG/M2 | OXYGEN SATURATION: 91 % | SYSTOLIC BLOOD PRESSURE: 146 MMHG | DIASTOLIC BLOOD PRESSURE: 78 MMHG | HEIGHT: 66 IN

## 2022-10-13 DIAGNOSIS — J44.9 CHRONIC OBSTRUCTIVE PULMONARY DISEASE, UNSPECIFIED COPD TYPE: ICD-10-CM

## 2022-10-13 DIAGNOSIS — Z86.2 HISTORY OF SARCOIDOSIS: Chronic | ICD-10-CM

## 2022-10-13 DIAGNOSIS — R05.9 COUGH: ICD-10-CM

## 2022-10-13 DIAGNOSIS — J06.9 UPPER RESPIRATORY TRACT INFECTION, UNSPECIFIED TYPE: Primary | ICD-10-CM

## 2022-10-13 DIAGNOSIS — G47.33 OSA (OBSTRUCTIVE SLEEP APNEA): ICD-10-CM

## 2022-10-13 LAB
CTP QC/QA: YES
POC MOLECULAR INFLUENZA A AGN: NEGATIVE
POC MOLECULAR INFLUENZA B AGN: NEGATIVE
SARS-COV-2 RNA RESP QL NAA+PROBE: NOT DETECTED

## 2022-10-13 PROCEDURE — 3044F PR MOST RECENT HEMOGLOBIN A1C LEVEL <7.0%: ICD-10-PCS | Mod: CPTII,S$GLB,, | Performed by: INTERNAL MEDICINE

## 2022-10-13 PROCEDURE — U0003 INFECTIOUS AGENT DETECTION BY NUCLEIC ACID (DNA OR RNA); SEVERE ACUTE RESPIRATORY SYNDROME CORONAVIRUS 2 (SARS-COV-2) (CORONAVIRUS DISEASE [COVID-19]), AMPLIFIED PROBE TECHNIQUE, MAKING USE OF HIGH THROUGHPUT TECHNOLOGIES AS DESCRIBED BY CMS-2020-01-R: HCPCS | Performed by: INTERNAL MEDICINE

## 2022-10-13 PROCEDURE — 1111F PR DISCHARGE MEDS RECONCILED W/ CURRENT OUTPATIENT MED LIST: ICD-10-PCS | Mod: CPTII,S$GLB,, | Performed by: INTERNAL MEDICINE

## 2022-10-13 PROCEDURE — 1159F PR MEDICATION LIST DOCUMENTED IN MEDICAL RECORD: ICD-10-PCS | Mod: CPTII,S$GLB,, | Performed by: INTERNAL MEDICINE

## 2022-10-13 PROCEDURE — 3078F PR MOST RECENT DIASTOLIC BLOOD PRESSURE < 80 MM HG: ICD-10-PCS | Mod: CPTII,S$GLB,, | Performed by: INTERNAL MEDICINE

## 2022-10-13 PROCEDURE — 3077F SYST BP >= 140 MM HG: CPT | Mod: CPTII,S$GLB,, | Performed by: INTERNAL MEDICINE

## 2022-10-13 PROCEDURE — 3066F PR DOCUMENTATION OF TREATMENT FOR NEPHROPATHY: ICD-10-PCS | Mod: CPTII,S$GLB,, | Performed by: INTERNAL MEDICINE

## 2022-10-13 PROCEDURE — 99214 OFFICE O/P EST MOD 30 MIN: CPT | Mod: S$GLB,,, | Performed by: INTERNAL MEDICINE

## 2022-10-13 PROCEDURE — 99214 PR OFFICE/OUTPT VISIT, EST, LEVL IV, 30-39 MIN: ICD-10-PCS | Mod: S$GLB,,, | Performed by: INTERNAL MEDICINE

## 2022-10-13 PROCEDURE — 99999 PR PBB SHADOW E&M-EST. PATIENT-LVL IV: ICD-10-PCS | Mod: PBBFAC,,, | Performed by: INTERNAL MEDICINE

## 2022-10-13 PROCEDURE — 87502 POCT INFLUENZA A/B MOLECULAR: ICD-10-PCS | Mod: QW,S$GLB,, | Performed by: INTERNAL MEDICINE

## 2022-10-13 PROCEDURE — 3066F NEPHROPATHY DOC TX: CPT | Mod: CPTII,S$GLB,, | Performed by: INTERNAL MEDICINE

## 2022-10-13 PROCEDURE — 1159F MED LIST DOCD IN RCRD: CPT | Mod: CPTII,S$GLB,, | Performed by: INTERNAL MEDICINE

## 2022-10-13 PROCEDURE — 4010F ACE/ARB THERAPY RXD/TAKEN: CPT | Mod: CPTII,S$GLB,, | Performed by: INTERNAL MEDICINE

## 2022-10-13 PROCEDURE — 4010F PR ACE/ARB THEARPY RXD/TAKEN: ICD-10-PCS | Mod: CPTII,S$GLB,, | Performed by: INTERNAL MEDICINE

## 2022-10-13 PROCEDURE — 99999 PR PBB SHADOW E&M-EST. PATIENT-LVL IV: CPT | Mod: PBBFAC,,, | Performed by: INTERNAL MEDICINE

## 2022-10-13 PROCEDURE — 3044F HG A1C LEVEL LT 7.0%: CPT | Mod: CPTII,S$GLB,, | Performed by: INTERNAL MEDICINE

## 2022-10-13 PROCEDURE — 3061F PR NEG MICROALBUMINURIA RESULT DOCUMENTED/REVIEW: ICD-10-PCS | Mod: CPTII,S$GLB,, | Performed by: INTERNAL MEDICINE

## 2022-10-13 PROCEDURE — U0005 INFEC AGEN DETEC AMPLI PROBE: HCPCS | Performed by: INTERNAL MEDICINE

## 2022-10-13 PROCEDURE — 1111F DSCHRG MED/CURRENT MED MERGE: CPT | Mod: CPTII,S$GLB,, | Performed by: INTERNAL MEDICINE

## 2022-10-13 PROCEDURE — 3078F DIAST BP <80 MM HG: CPT | Mod: CPTII,S$GLB,, | Performed by: INTERNAL MEDICINE

## 2022-10-13 PROCEDURE — 3061F NEG MICROALBUMINURIA REV: CPT | Mod: CPTII,S$GLB,, | Performed by: INTERNAL MEDICINE

## 2022-10-13 PROCEDURE — 87502 INFLUENZA DNA AMP PROBE: CPT | Mod: QW,S$GLB,, | Performed by: INTERNAL MEDICINE

## 2022-10-13 PROCEDURE — 3077F PR MOST RECENT SYSTOLIC BLOOD PRESSURE >= 140 MM HG: ICD-10-PCS | Mod: CPTII,S$GLB,, | Performed by: INTERNAL MEDICINE

## 2022-10-13 NOTE — PROGRESS NOTES
"Subjective:       Patient ID: Earl Abdul is a 62 y.o. female.    Chief Complaint: No chief complaint on file.    Here for hospital f/u    Recently admitted for etoh withdrawal with metabolic derangement. Covered for sepsis.   63 yo F with PMHx of HTN, HLD, Breast CA, DM, COPD (night oxygen of 3L), MDD (hx SA), Etoh abuse (withdrawal seizures in past and pancreatitis), Former Smoker 1ppd, Hepatic Steatosis, Hypothyroidism, Sarcoidosis of Lung (not active), PUD/Gastritis, hx C. Diff (2014        . hospital course complicated by electrolyte derangements secondary to malnutrition that were aggressively repleted. Additionally, Patient reported that since her diagnosis of diabetes (prior A1c 6.6-> now 5.1) She has felt uncomfortable about what she should eat, despite diabetes education, and so she doesn't eat adding to her overall depression and continued drinking. Addiction psych consulted on 9/29.  - Finished scheduled Valium taper on 10/2. Recommend continuing thiamine and Folate Addiction Psych followed while inpatient and resources were given to her for AA and helping her quit. Referrals sent for OP psych follow up    Has not had drink since being admitted recently. Discharged 11 days prior.    ### MBCL/CLL ###  - 8/27/2022 CV Heme Dr Miguel Martin writes "-MBCL/CLL; did meet CLL criteria on one cbc June 2022 but has since returned to MBCL criteria;favorable risk 13 q del by fish; will send tp53 sequencing and ighv mutation with next lab draw for better risk stratification; no indication for imaging or marrow biopsy currently; do not feel cytopenias are related to her Lymphoproliferative disorder"    ### OAB ###  Intermittent diarrhea and OAB initially improved c/ stage II interstim     ### Sleep related breathing disorder-- COPD ###  06/2020 CV Dr Gregorio  9/30/2020 PSG with Parasomnia montage: "Little SDB events appeared to have been noted. Snoring was noted to be mild. The patient did not meet split " "night criteria set by the doctor.     ### DM ###  Home -210     ### Tobacco abuse/ COPD ###  11/14/2020 CT Chest: Multiple small ground-glass opacities in the right lung measuring up to 1.2 cm.  Findings are new when compared with 08/29/2020, which may favor a low-grade infectious or inflammatory process.  Recommend follow-up with noncontrast chest CT in 6-12 months to evaluate for resolution. Mildly enlarged nolvia hepatis lymph nodes and axillary lymph nodes, similar when compared with prior CT     ### hx breast CA ###   Of note underwent bilateral mastectomy for  T1b N0 stage IA breast cancer October 2020 with no adjuvant therapy and was started on  Letrozole February 2021-may 2021.  6/30/2022 Nusrat SHAMIR Prabhakar writes "Continue anastrozole. DXA due in 1 year last one showed normal bone density     COVID in January - also treated for ETOH withdawal  10/2022 inpt for etoh withdrawal        03/28/22 6MWT During exercise, there was significant desaturation while breathing room air.Both blood pressure and heart rate remained stable with walking          Review of Systems   Constitutional:  Negative for chills, fatigue, fever and unexpected weight change.   HENT:  Negative for ear pain, hearing loss, postnasal drip, tinnitus, trouble swallowing and voice change.    Respiratory:  Negative for cough, chest tightness, shortness of breath and wheezing.    Cardiovascular:  Negative for chest pain, palpitations and leg swelling.   Gastrointestinal:  Negative for abdominal pain, blood in stool, diarrhea, nausea and vomiting.   Endocrine: Negative for polydipsia, polyphagia and polyuria.   Genitourinary:  Negative for difficulty urinating, dysuria, hematuria and vaginal bleeding.   Skin:  Negative for rash.   Allergic/Immunologic: Negative for food allergies.   Neurological:  Negative for dizziness, numbness and headaches.   Hematological:  Does not bruise/bleed easily.   Psychiatric/Behavioral:  The patient is not " "nervous/anxious.      Objective:      Vitals:    10/13/22 1010   BP: (!) 146/78   BP Location: Left arm   Pulse: 94   SpO2: (!) 91%   Weight: 86.2 kg (190 lb 0.6 oz)   Height: 5' 6" (1.676 m)      Physical Exam  Vitals and nursing note reviewed.   Constitutional:       General: She is not in acute distress.     Appearance: Normal appearance. She is well-developed.   HENT:      Head: Normocephalic and atraumatic.      Mouth/Throat:      Pharynx: No oropharyngeal exudate.   Eyes:      General: No scleral icterus.     Conjunctiva/sclera: Conjunctivae normal.      Pupils: Pupils are equal, round, and reactive to light.   Neck:      Thyroid: No thyromegaly.   Cardiovascular:      Rate and Rhythm: Normal rate and regular rhythm.      Heart sounds: Normal heart sounds. No murmur heard.  Pulmonary:      Effort: Pulmonary effort is normal.      Breath sounds: Normal breath sounds. No wheezing or rales.   Abdominal:      General: There is no distension.   Musculoskeletal:         General: No tenderness.   Lymphadenopathy:      Cervical: No cervical adenopathy.   Skin:     General: Skin is warm and dry.   Neurological:      Mental Status: She is alert and oriented to person, place, and time.   Psychiatric:         Behavior: Behavior normal.       Assessment:       1. Upper respiratory tract infection, unspecified type    2. Cough    3. History of sarcoidosis    4. VINICIO (obstructive sleep apnea)    5. Chronic obstructive pulmonary disease, unspecified COPD type        Plan:       Diagnoses and all orders for this visit:    Upper respiratory tract infection, unspecified type   Presumed viral. OTC meds discussed.  Prompts to call office discussed.    Cough  -     COVID-19 Routine Screening  -     POCT Influenza A/B Molecular    History of sarcoidosis    VINICIO (obstructive sleep apnea)    Chronic obstructive pulmonary disease, unspecified COPD type         Andrew Chase MD  Internal Medicine-[facility]        Side effects of " medication(s) were discussed in detail and patient voiced understanding.  Patient will call back for any issues or complications.

## 2022-10-14 ENCOUNTER — PATIENT MESSAGE (OUTPATIENT)
Dept: INTERNAL MEDICINE | Facility: CLINIC | Age: 62
End: 2022-10-14
Payer: COMMERCIAL

## 2022-10-14 RX ORDER — PREDNISONE 10 MG/1
40 TABLET ORAL DAILY
Qty: 20 TABLET | Refills: 0 | Status: SHIPPED | OUTPATIENT
Start: 2022-10-14 | End: 2022-10-19

## 2022-10-14 RX ORDER — AZITHROMYCIN 250 MG/1
TABLET, FILM COATED ORAL
Qty: 6 TABLET | Refills: 0 | Status: SHIPPED | OUTPATIENT
Start: 2022-10-14 | End: 2022-10-19

## 2022-10-14 NOTE — TELEPHONE ENCOUNTER
----- Message from Ivanna Sierra sent at 10/14/2022  8:59 AM CDT -----  Regarding: Patient call back  Who called: MARTY SALDAÑA [[id number]]    What is the request in detail: Patient is requesting a call back. She states she did not received her scripts and she is very sick. She would like them set in as soon as possible.   Please advise.    Can the clinic reply by [facility]? No    Best call back number: [phone]    Additional Information: N/A

## 2022-10-15 ENCOUNTER — PATIENT MESSAGE (OUTPATIENT)
Dept: ENDOCRINOLOGY | Facility: CLINIC | Age: 62
End: 2022-10-15
Payer: COMMERCIAL

## 2022-10-17 ENCOUNTER — PATIENT MESSAGE (OUTPATIENT)
Dept: ENDOCRINOLOGY | Facility: CLINIC | Age: 62
End: 2022-10-17
Payer: COMMERCIAL

## 2022-10-17 RX ORDER — SEMAGLUTIDE 1.34 MG/ML
0.5 INJECTION, SOLUTION SUBCUTANEOUS
Qty: 1 PEN | Refills: 11 | Status: ON HOLD | OUTPATIENT
Start: 2022-10-17 | End: 2022-12-23

## 2022-10-18 ENCOUNTER — PATIENT MESSAGE (OUTPATIENT)
Dept: NEUROLOGY | Facility: CLINIC | Age: 62
End: 2022-10-18
Payer: COMMERCIAL

## 2022-10-20 ENCOUNTER — PATIENT MESSAGE (OUTPATIENT)
Dept: INTERNAL MEDICINE | Facility: CLINIC | Age: 62
End: 2022-10-20
Payer: COMMERCIAL

## 2022-10-20 RX ORDER — PREDNISONE 10 MG/1
TABLET ORAL
Qty: 32 TABLET | Refills: 0 | Status: ON HOLD | OUTPATIENT
Start: 2022-10-20 | End: 2022-12-28 | Stop reason: HOSPADM

## 2022-11-01 ENCOUNTER — RESEARCH ENCOUNTER (OUTPATIENT)
Dept: RESEARCH | Facility: HOSPITAL | Age: 62
End: 2022-11-01
Payer: COMMERCIAL

## 2022-11-02 NOTE — PROGRESS NOTES
Called patient to schedule her appointment with Dr. Edwards and also her 3 month Peer2 visit, could not reach left VM      7-Nov-22: Called patient to schedule her appointment with Dr. Edwards and also her 3 month Peer2 visit, could not reach left VM    22-Mar-23: Called patient to schedule her appointment with Dr. Edwards and also her 3 month Peer2 visit, could not reach left VM

## 2022-11-23 ENCOUNTER — PATIENT OUTREACH (OUTPATIENT)
Dept: ADMINISTRATIVE | Facility: HOSPITAL | Age: 62
End: 2022-11-23
Payer: COMMERCIAL

## 2022-11-30 ENCOUNTER — PATIENT MESSAGE (OUTPATIENT)
Dept: INTERNAL MEDICINE | Facility: CLINIC | Age: 62
End: 2022-11-30
Payer: COMMERCIAL

## 2022-11-30 ENCOUNTER — PATIENT OUTREACH (OUTPATIENT)
Dept: INTERNAL MEDICINE | Facility: CLINIC | Age: 62
End: 2022-11-30
Payer: COMMERCIAL

## 2022-12-23 ENCOUNTER — HOSPITAL ENCOUNTER (INPATIENT)
Facility: HOSPITAL | Age: 62
LOS: 1 days | Discharge: HOME | DRG: 897 | End: 2022-12-23
Attending: EMERGENCY MEDICINE | Admitting: HOSPITALIST
Payer: COMMERCIAL

## 2022-12-23 DIAGNOSIS — R07.9 CHEST PAIN: ICD-10-CM

## 2022-12-23 DIAGNOSIS — E87.29 ALCOHOLIC KETOACIDOSIS: ICD-10-CM

## 2022-12-23 DIAGNOSIS — R94.31 QT PROLONGATION: ICD-10-CM

## 2022-12-23 DIAGNOSIS — F10.930 ALCOHOL WITHDRAWAL SYNDROME WITHOUT COMPLICATION: Primary | ICD-10-CM

## 2022-12-23 DIAGNOSIS — R09.02 HYPOXIA: ICD-10-CM

## 2022-12-23 DIAGNOSIS — K92.0 HEMATEMESIS, UNSPECIFIED WHETHER NAUSEA PRESENT: ICD-10-CM

## 2022-12-23 LAB
ALBUMIN SERPL BCP-MCNC: 4.8 G/DL (ref 3.5–5.2)
ALLENS TEST: ABNORMAL
ALP SERPL-CCNC: 73 U/L (ref 55–135)
ALT SERPL W/O P-5'-P-CCNC: 55 U/L (ref 10–44)
ANION GAP SERPL CALC-SCNC: 15 MMOL/L (ref 8–16)
ANION GAP SERPL CALC-SCNC: 16 MMOL/L (ref 8–16)
ANION GAP SERPL CALC-SCNC: 16 MMOL/L (ref 8–16)
ANION GAP SERPL CALC-SCNC: 24 MMOL/L (ref 8–16)
ANISOCYTOSIS BLD QL SMEAR: SLIGHT
AST SERPL-CCNC: 114 U/L (ref 10–40)
B-OH-BUTYR BLD STRIP-SCNC: 6.7 MMOL/L (ref 0–0.5)
BACTERIA #/AREA URNS AUTO: NORMAL /HPF
BASOPHILS # BLD AUTO: ABNORMAL K/UL (ref 0–0.2)
BASOPHILS NFR BLD: 0 % (ref 0–1.9)
BILIRUB SERPL-MCNC: 1 MG/DL (ref 0.1–1)
BILIRUB UR QL STRIP: NEGATIVE
BUN SERPL-MCNC: 10 MG/DL (ref 8–23)
BUN SERPL-MCNC: 12 MG/DL (ref 8–23)
BUN SERPL-MCNC: 8 MG/DL (ref 8–23)
BUN SERPL-MCNC: 9 MG/DL (ref 8–23)
CALCIUM SERPL-MCNC: 7.8 MG/DL (ref 8.7–10.5)
CALCIUM SERPL-MCNC: 7.8 MG/DL (ref 8.7–10.5)
CALCIUM SERPL-MCNC: 8.1 MG/DL (ref 8.7–10.5)
CALCIUM SERPL-MCNC: 9 MG/DL (ref 8.7–10.5)
CHLORIDE SERPL-SCNC: 100 MMOL/L (ref 95–110)
CHLORIDE SERPL-SCNC: 101 MMOL/L (ref 95–110)
CHLORIDE SERPL-SCNC: 101 MMOL/L (ref 95–110)
CHLORIDE SERPL-SCNC: 94 MMOL/L (ref 95–110)
CLARITY UR REFRACT.AUTO: CLEAR
CO2 SERPL-SCNC: 20 MMOL/L (ref 23–29)
CO2 SERPL-SCNC: 21 MMOL/L (ref 23–29)
CO2 SERPL-SCNC: 21 MMOL/L (ref 23–29)
CO2 SERPL-SCNC: 23 MMOL/L (ref 23–29)
COLOR UR AUTO: YELLOW
CREAT SERPL-MCNC: 0.8 MG/DL (ref 0.5–1.4)
CREAT SERPL-MCNC: 0.8 MG/DL (ref 0.5–1.4)
CREAT SERPL-MCNC: 1 MG/DL (ref 0.5–1.4)
CREAT SERPL-MCNC: 1 MG/DL (ref 0.5–1.4)
DELSYS: ABNORMAL
DIFFERENTIAL METHOD: ABNORMAL
EOSINOPHIL # BLD AUTO: ABNORMAL K/UL (ref 0–0.5)
EOSINOPHIL NFR BLD: 0 % (ref 0–8)
ERYTHROCYTE [DISTWIDTH] IN BLOOD BY AUTOMATED COUNT: 17.2 % (ref 11.5–14.5)
EST. GFR  (NO RACE VARIABLE): >60 ML/MIN/1.73 M^2
ESTIMATED AVG GLUCOSE: 85 MG/DL (ref 68–131)
ETHANOL SERPL-MCNC: 211 MG/DL
GLUCOSE SERPL-MCNC: 126 MG/DL (ref 70–110)
GLUCOSE SERPL-MCNC: 161 MG/DL (ref 70–110)
GLUCOSE SERPL-MCNC: 51 MG/DL (ref 70–110)
GLUCOSE SERPL-MCNC: 73 MG/DL (ref 70–110)
GLUCOSE UR QL STRIP: NEGATIVE
HBA1C MFR BLD: 4.6 % (ref 4–5.6)
HCO3 UR-SCNC: 25.1 MMOL/L (ref 24–28)
HCT VFR BLD AUTO: 39.4 % (ref 37–48.5)
HGB BLD-MCNC: 12 G/DL (ref 12–16)
HGB UR QL STRIP: NEGATIVE
IMM GRANULOCYTES # BLD AUTO: ABNORMAL K/UL (ref 0–0.04)
IMM GRANULOCYTES NFR BLD AUTO: ABNORMAL % (ref 0–0.5)
INR PPP: 1 (ref 0.8–1.2)
KETONES UR QL STRIP: ABNORMAL
LACTATE SERPL-SCNC: 1.6 MMOL/L (ref 0.5–2.2)
LEUKOCYTE ESTERASE UR QL STRIP: ABNORMAL
LIPASE SERPL-CCNC: 35 U/L (ref 4–60)
LYMPHOCYTES # BLD AUTO: ABNORMAL K/UL (ref 1–4.8)
LYMPHOCYTES NFR BLD: 82.5 % (ref 18–48)
MAGNESIUM SERPL-MCNC: 1.9 MG/DL (ref 1.6–2.6)
MCH RBC QN AUTO: 29.2 PG (ref 27–31)
MCHC RBC AUTO-ENTMCNC: 30.5 G/DL (ref 32–36)
MCV RBC AUTO: 96 FL (ref 82–98)
MICROSCOPIC COMMENT: NORMAL
MODE: ABNORMAL
MONOCYTES # BLD AUTO: ABNORMAL K/UL (ref 0.3–1)
MONOCYTES NFR BLD: 0.5 % (ref 4–15)
NEUTROPHILS NFR BLD: 16.5 % (ref 38–73)
NEUTS BAND NFR BLD MANUAL: 0.5 %
NITRITE UR QL STRIP: NEGATIVE
NRBC BLD-RTO: 0 /100 WBC
PCO2 BLDA: 54.6 MMHG (ref 35–45)
PH SMN: 7.27 [PH] (ref 7.35–7.45)
PH UR STRIP: 6 [PH] (ref 5–8)
PHOSPHATE SERPL-MCNC: 2.1 MG/DL (ref 2.7–4.5)
PLATELET # BLD AUTO: 104 K/UL (ref 150–450)
PLATELET BLD QL SMEAR: ABNORMAL
PMV BLD AUTO: 10.4 FL (ref 9.2–12.9)
PO2 BLDA: 32 MMHG (ref 40–60)
POC BE: -2 MMOL/L
POC SATURATED O2: 53 % (ref 95–100)
POC TCO2: 27 MMOL/L (ref 24–29)
POCT GLUCOSE: 113 MG/DL (ref 70–110)
POCT GLUCOSE: 121 MG/DL (ref 70–110)
POCT GLUCOSE: 122 MG/DL (ref 70–110)
POCT GLUCOSE: 131 MG/DL (ref 70–110)
POCT GLUCOSE: 180 MG/DL (ref 70–110)
POCT GLUCOSE: 75 MG/DL (ref 70–110)
POLYCHROMASIA BLD QL SMEAR: ABNORMAL
POTASSIUM SERPL-SCNC: 3.5 MMOL/L (ref 3.5–5.1)
POTASSIUM SERPL-SCNC: 3.6 MMOL/L (ref 3.5–5.1)
POTASSIUM SERPL-SCNC: 3.8 MMOL/L (ref 3.5–5.1)
POTASSIUM SERPL-SCNC: 4 MMOL/L (ref 3.5–5.1)
PROT SERPL-MCNC: 8 G/DL (ref 6–8.4)
PROT UR QL STRIP: ABNORMAL
PROTHROMBIN TIME: 10.9 SEC (ref 9–12.5)
RBC # BLD AUTO: 4.11 M/UL (ref 4–5.4)
RBC #/AREA URNS AUTO: 0 /HPF (ref 0–4)
SAMPLE: ABNORMAL
SITE: ABNORMAL
SODIUM SERPL-SCNC: 138 MMOL/L (ref 136–145)
SP GR UR STRIP: 1.02 (ref 1–1.03)
SQUAMOUS #/AREA URNS AUTO: 1 /HPF
TROPONIN I SERPL DL<=0.01 NG/ML-MCNC: 0.01 NG/ML (ref 0–0.03)
URN SPEC COLLECT METH UR: ABNORMAL
WBC # BLD AUTO: 32.8 K/UL (ref 3.9–12.7)
WBC #/AREA URNS AUTO: 3 /HPF (ref 0–5)

## 2022-12-23 PROCEDURE — 85027 COMPLETE CBC AUTOMATED: CPT

## 2022-12-23 PROCEDURE — 93010 EKG 12-LEAD: ICD-10-PCS | Mod: ,,, | Performed by: INTERNAL MEDICINE

## 2022-12-23 PROCEDURE — 63600175 PHARM REV CODE 636 W HCPCS

## 2022-12-23 PROCEDURE — 25000003 PHARM REV CODE 250

## 2022-12-23 PROCEDURE — 85610 PROTHROMBIN TIME: CPT

## 2022-12-23 PROCEDURE — 84100 ASSAY OF PHOSPHORUS: CPT

## 2022-12-23 PROCEDURE — 99223 PR INITIAL HOSPITAL CARE,LEVL III: ICD-10-PCS | Mod: ,,, | Performed by: HOSPITALIST

## 2022-12-23 PROCEDURE — 99285 PR EMERGENCY DEPT VISIT,LEVEL V: ICD-10-PCS | Mod: ,,, | Performed by: EMERGENCY MEDICINE

## 2022-12-23 PROCEDURE — 84484 ASSAY OF TROPONIN QUANT: CPT

## 2022-12-23 PROCEDURE — 90792 PR PSYCHIATRIC DIAGNOSTIC EVALUATION W/MEDICAL SERVICES: ICD-10-PCS | Mod: ,,, | Performed by: PSYCHIATRY & NEUROLOGY

## 2022-12-23 PROCEDURE — 99223 1ST HOSP IP/OBS HIGH 75: CPT | Mod: ,,, | Performed by: HOSPITALIST

## 2022-12-23 PROCEDURE — 83605 ASSAY OF LACTIC ACID: CPT

## 2022-12-23 PROCEDURE — 93005 ELECTROCARDIOGRAM TRACING: CPT

## 2022-12-23 PROCEDURE — 81001 URINALYSIS AUTO W/SCOPE: CPT

## 2022-12-23 PROCEDURE — 82077 ASSAY SPEC XCP UR&BREATH IA: CPT | Performed by: EMERGENCY MEDICINE

## 2022-12-23 PROCEDURE — 27000221 HC OXYGEN, UP TO 24 HOURS

## 2022-12-23 PROCEDURE — 83036 HEMOGLOBIN GLYCOSYLATED A1C: CPT

## 2022-12-23 PROCEDURE — 99900035 HC TECH TIME PER 15 MIN (STAT)

## 2022-12-23 PROCEDURE — 96361 HYDRATE IV INFUSION ADD-ON: CPT

## 2022-12-23 PROCEDURE — 20600001 HC STEP DOWN PRIVATE ROOM

## 2022-12-23 PROCEDURE — 93010 ELECTROCARDIOGRAM REPORT: CPT | Mod: ,,, | Performed by: INTERNAL MEDICINE

## 2022-12-23 PROCEDURE — 82010 KETONE BODYS QUAN: CPT

## 2022-12-23 PROCEDURE — 99285 EMERGENCY DEPT VISIT HI MDM: CPT | Mod: 25

## 2022-12-23 PROCEDURE — C9113 INJ PANTOPRAZOLE SODIUM, VIA: HCPCS

## 2022-12-23 PROCEDURE — 80053 COMPREHEN METABOLIC PANEL: CPT

## 2022-12-23 PROCEDURE — 99285 EMERGENCY DEPT VISIT HI MDM: CPT | Mod: ,,, | Performed by: EMERGENCY MEDICINE

## 2022-12-23 PROCEDURE — 99222 PR INITIAL HOSPITAL CARE,LEVL II: ICD-10-PCS | Mod: ,,, | Performed by: INTERNAL MEDICINE

## 2022-12-23 PROCEDURE — 90792 PSYCH DIAG EVAL W/MED SRVCS: CPT | Mod: ,,, | Performed by: PSYCHIATRY & NEUROLOGY

## 2022-12-23 PROCEDURE — 83690 ASSAY OF LIPASE: CPT

## 2022-12-23 PROCEDURE — 36415 COLL VENOUS BLD VENIPUNCTURE: CPT

## 2022-12-23 PROCEDURE — 94761 N-INVAS EAR/PLS OXIMETRY MLT: CPT

## 2022-12-23 PROCEDURE — 82803 BLOOD GASES ANY COMBINATION: CPT

## 2022-12-23 PROCEDURE — 25000003 PHARM REV CODE 250: Performed by: EMERGENCY MEDICINE

## 2022-12-23 PROCEDURE — 80048 BASIC METABOLIC PNL TOTAL CA: CPT | Mod: 91,XB

## 2022-12-23 PROCEDURE — 99222 1ST HOSP IP/OBS MODERATE 55: CPT | Mod: ,,, | Performed by: INTERNAL MEDICINE

## 2022-12-23 PROCEDURE — 96374 THER/PROPH/DIAG INJ IV PUSH: CPT

## 2022-12-23 PROCEDURE — 85007 BL SMEAR W/DIFF WBC COUNT: CPT

## 2022-12-23 PROCEDURE — 25000003 PHARM REV CODE 250: Performed by: INTERNAL MEDICINE

## 2022-12-23 PROCEDURE — 83735 ASSAY OF MAGNESIUM: CPT

## 2022-12-23 RX ORDER — FOLIC ACID 1 MG/1
1 TABLET ORAL DAILY
Status: DISCONTINUED | OUTPATIENT
Start: 2022-12-23 | End: 2022-12-28 | Stop reason: HOSPADM

## 2022-12-23 RX ORDER — IBUPROFEN 200 MG
24 TABLET ORAL
Status: DISCONTINUED | OUTPATIENT
Start: 2022-12-23 | End: 2022-12-28 | Stop reason: HOSPADM

## 2022-12-23 RX ORDER — TOPIRAMATE 50 MG/1
50 TABLET, FILM COATED ORAL NIGHTLY
Status: ON HOLD | COMMUNITY
Start: 2022-11-25 | End: 2022-12-28 | Stop reason: HOSPADM

## 2022-12-23 RX ORDER — POTASSIUM CHLORIDE 20 MEQ/1
40 TABLET, EXTENDED RELEASE ORAL ONCE
Status: COMPLETED | OUTPATIENT
Start: 2022-12-23 | End: 2022-12-23

## 2022-12-23 RX ORDER — IBUPROFEN 200 MG
TABLET ORAL
Status: DISCONTINUED
Start: 2022-12-23 | End: 2022-12-23 | Stop reason: HOSPADM

## 2022-12-23 RX ORDER — NALOXONE HCL 0.4 MG/ML
0.02 VIAL (ML) INJECTION
Status: DISCONTINUED | OUTPATIENT
Start: 2022-12-23 | End: 2022-12-28 | Stop reason: HOSPADM

## 2022-12-23 RX ORDER — TRAZODONE HYDROCHLORIDE 100 MG/1
300 TABLET ORAL ONCE
Status: COMPLETED | OUTPATIENT
Start: 2022-12-23 | End: 2022-12-23

## 2022-12-23 RX ORDER — DIAZEPAM 5 MG/1
10 TABLET ORAL EVERY 6 HOURS
Status: DISCONTINUED | OUTPATIENT
Start: 2022-12-23 | End: 2022-12-27

## 2022-12-23 RX ORDER — LOPERAMIDE HYDROCHLORIDE 2 MG/1
2 CAPSULE ORAL 4 TIMES DAILY PRN
Status: ON HOLD | COMMUNITY
End: 2023-11-29 | Stop reason: HOSPADM

## 2022-12-23 RX ORDER — LORAZEPAM 2 MG/ML
INJECTION INTRAMUSCULAR
Status: COMPLETED
Start: 2022-12-23 | End: 2022-12-26

## 2022-12-23 RX ORDER — ENOXAPARIN SODIUM 100 MG/ML
40 INJECTION SUBCUTANEOUS EVERY 24 HOURS
Status: DISCONTINUED | OUTPATIENT
Start: 2022-12-23 | End: 2022-12-23

## 2022-12-23 RX ORDER — DEXTROSE MONOHYDRATE AND SODIUM CHLORIDE 5; .9 G/100ML; G/100ML
INJECTION, SOLUTION INTRAVENOUS CONTINUOUS
Status: DISCONTINUED | OUTPATIENT
Start: 2022-12-23 | End: 2022-12-23

## 2022-12-23 RX ORDER — ONDANSETRON 2 MG/ML
INJECTION INTRAMUSCULAR; INTRAVENOUS
Status: DISCONTINUED
Start: 2022-12-23 | End: 2022-12-23 | Stop reason: HOSPADM

## 2022-12-23 RX ORDER — PANTOPRAZOLE SODIUM 40 MG/10ML
INJECTION, POWDER, LYOPHILIZED, FOR SOLUTION INTRAVENOUS
Status: DISCONTINUED
Start: 2022-12-23 | End: 2022-12-23 | Stop reason: HOSPADM

## 2022-12-23 RX ORDER — PANTOPRAZOLE SODIUM 40 MG/10ML
40 INJECTION, POWDER, LYOPHILIZED, FOR SOLUTION INTRAVENOUS EVERY 12 HOURS
Status: DISCONTINUED | OUTPATIENT
Start: 2022-12-24 | End: 2022-12-24

## 2022-12-23 RX ORDER — PANTOPRAZOLE SODIUM 40 MG/10ML
80 INJECTION, POWDER, LYOPHILIZED, FOR SOLUTION INTRAVENOUS ONCE
Status: COMPLETED | OUTPATIENT
Start: 2022-12-23 | End: 2022-12-23

## 2022-12-23 RX ORDER — LORAZEPAM 2 MG/ML
2 INJECTION INTRAMUSCULAR EVERY 6 HOURS PRN
Status: DISCONTINUED | OUTPATIENT
Start: 2022-12-23 | End: 2022-12-24

## 2022-12-23 RX ORDER — METOCLOPRAMIDE 10 MG/1
TABLET ORAL
Status: DISCONTINUED
Start: 2022-12-23 | End: 2022-12-23 | Stop reason: HOSPADM

## 2022-12-23 RX ORDER — LORAZEPAM 2 MG/ML
2 INJECTION INTRAMUSCULAR
Status: COMPLETED | OUTPATIENT
Start: 2022-12-23 | End: 2022-12-23

## 2022-12-23 RX ORDER — DIAZEPAM 5 MG/1
TABLET ORAL
Status: DISCONTINUED
Start: 2022-12-23 | End: 2022-12-23 | Stop reason: HOSPADM

## 2022-12-23 RX ORDER — IBUPROFEN 200 MG
16 TABLET ORAL
Status: DISCONTINUED | OUTPATIENT
Start: 2022-12-23 | End: 2022-12-28 | Stop reason: HOSPADM

## 2022-12-23 RX ORDER — DEXTROSE MONOHYDRATE AND SODIUM CHLORIDE 5; .9 G/100ML; G/100ML
INJECTION, SOLUTION INTRAVENOUS CONTINUOUS
Status: DISCONTINUED | OUTPATIENT
Start: 2022-12-23 | End: 2022-12-28 | Stop reason: HOSPADM

## 2022-12-23 RX ORDER — CALCIUM CARBONATE/VITAMIN D3 600 MG-10
100 TABLET ORAL
Status: ON HOLD | COMMUNITY
Start: 2022-11-25 | End: 2022-12-28 | Stop reason: HOSPADM

## 2022-12-23 RX ORDER — THIAMINE HCL 100 MG
100 TABLET ORAL DAILY
Status: DISCONTINUED | OUTPATIENT
Start: 2022-12-23 | End: 2022-12-28 | Stop reason: HOSPADM

## 2022-12-23 RX ORDER — LEVOTHYROXINE SODIUM 50 UG/1
50 TABLET ORAL
Status: DISCONTINUED | OUTPATIENT
Start: 2022-12-24 | End: 2022-12-28 | Stop reason: HOSPADM

## 2022-12-23 RX ORDER — DIAZEPAM 5 MG/1
10 TABLET ORAL EVERY 8 HOURS
Status: DISCONTINUED | OUTPATIENT
Start: 2022-12-23 | End: 2022-12-23

## 2022-12-23 RX ORDER — METOCLOPRAMIDE 10 MG/1
10 TABLET ORAL
Status: COMPLETED | OUTPATIENT
Start: 2022-12-23 | End: 2022-12-23

## 2022-12-23 RX ORDER — GLUCAGON 1 MG
1 KIT INJECTION
Status: DISCONTINUED | OUTPATIENT
Start: 2022-12-23 | End: 2022-12-28 | Stop reason: HOSPADM

## 2022-12-23 RX ORDER — OCTREOTIDE ACETATE 50 UG/ML
50 INJECTION, SOLUTION INTRAVENOUS; SUBCUTANEOUS ONCE
Status: CANCELLED | OUTPATIENT
Start: 2022-12-23

## 2022-12-23 RX ORDER — ONDANSETRON 4 MG/1
4 TABLET, ORALLY DISINTEGRATING ORAL
Status: DISCONTINUED | OUTPATIENT
Start: 2022-12-23 | End: 2022-12-23

## 2022-12-23 RX ORDER — ONDANSETRON 2 MG/ML
4 INJECTION INTRAMUSCULAR; INTRAVENOUS EVERY 6 HOURS PRN
Status: DISCONTINUED | OUTPATIENT
Start: 2022-12-23 | End: 2022-12-28 | Stop reason: HOSPADM

## 2022-12-23 RX ORDER — POTASSIUM CHLORIDE 20 MEQ/1
TABLET, EXTENDED RELEASE ORAL
Status: DISCONTINUED
Start: 2022-12-23 | End: 2022-12-23 | Stop reason: HOSPADM

## 2022-12-23 RX ORDER — SODIUM CHLORIDE 0.9 % (FLUSH) 0.9 %
10 SYRINGE (ML) INJECTION EVERY 12 HOURS PRN
Status: DISCONTINUED | OUTPATIENT
Start: 2022-12-23 | End: 2022-12-28 | Stop reason: HOSPADM

## 2022-12-23 RX ORDER — DIVALPROEX SODIUM 125 MG/1
250 CAPSULE, COATED PELLETS ORAL 2 TIMES DAILY
Status: ON HOLD | COMMUNITY
Start: 2022-12-02 | End: 2022-12-28 | Stop reason: HOSPADM

## 2022-12-23 RX ADMIN — DIAZEPAM 10 MG: 5 TABLET ORAL at 11:12

## 2022-12-23 RX ADMIN — DIAZEPAM 10 MG: 5 TABLET ORAL at 08:12

## 2022-12-23 RX ADMIN — Medication 16 G: at 07:12

## 2022-12-23 RX ADMIN — DEXTROSE AND SODIUM CHLORIDE: 5; .9 INJECTION, SOLUTION INTRAVENOUS at 06:12

## 2022-12-23 RX ADMIN — THERA TABS 1 TABLET: TAB at 03:12

## 2022-12-23 RX ADMIN — LORAZEPAM 2 MG: 2 INJECTION INTRAMUSCULAR; INTRAVENOUS at 08:12

## 2022-12-23 RX ADMIN — SODIUM CHLORIDE 1000 ML: 9 INJECTION, SOLUTION INTRAVENOUS at 05:12

## 2022-12-23 RX ADMIN — THIAMINE HYDROCHLORIDE 100 MG: 100 INJECTION, SOLUTION INTRAMUSCULAR; INTRAVENOUS at 06:12

## 2022-12-23 RX ADMIN — THIAMINE HCL TAB 100 MG 100 MG: 100 TAB at 03:12

## 2022-12-23 RX ADMIN — FOLIC ACID 1 MG: 1 TABLET ORAL at 03:12

## 2022-12-23 RX ADMIN — PANTOPRAZOLE SODIUM 80 MG: 40 INJECTION, POWDER, FOR SOLUTION INTRAVENOUS at 08:12

## 2022-12-23 RX ADMIN — POTASSIUM CHLORIDE 40 MEQ: 1500 TABLET, EXTENDED RELEASE ORAL at 11:12

## 2022-12-23 RX ADMIN — DIAZEPAM 10 MG: 5 TABLET ORAL at 03:12

## 2022-12-23 RX ADMIN — TRAZODONE HYDROCHLORIDE 300 MG: 100 TABLET ORAL at 11:12

## 2022-12-23 RX ADMIN — LORAZEPAM 2 MG: 2 INJECTION INTRAMUSCULAR; INTRAVENOUS at 05:12

## 2022-12-23 RX ADMIN — ONDANSETRON 4 MG: 2 INJECTION INTRAMUSCULAR; INTRAVENOUS at 08:12

## 2022-12-23 RX ADMIN — DEXTROSE AND SODIUM CHLORIDE: 5; .9 INJECTION, SOLUTION INTRAVENOUS at 08:12

## 2022-12-23 RX ADMIN — LORAZEPAM 2 MG: 2 INJECTION INTRAMUSCULAR; INTRAVENOUS at 11:12

## 2022-12-23 RX ADMIN — METOCLOPRAMIDE 10 MG: 10 TABLET ORAL at 05:12

## 2022-12-23 RX ADMIN — POTASSIUM CHLORIDE 40 MEQ: 1500 TABLET, EXTENDED RELEASE ORAL at 08:12

## 2022-12-23 NOTE — PHARMACY MED REC
"  Admission Medication History     The home medication history was taken by Quinton Pantoja.    You may go to "Admission" then "Reconcile Home Medications" tabs to review and/or act upon these items.     The home medication list has been updated by the Pharmacy department.   Please read ALL comments highlighted in yellow.   Please address this information as you see fit.    Feel free to contact us if you have any questions or require assistance.      The medications listed below were removed from the home medication list. Please reorder if appropriate:  Patient reports no longer taking the following medication(s):  ATORVASTATIN 10 MG TAB  CHLORZOXAZONE 500 MG TAB  FAMOTIDINE 1 MG TAB  FOLIC ACID 1 MG TAB  LISINOPRIL 20 MG TAB  PREDNISONE 10 MG TAB  SEMAGLUTIDE 0.25 MG OR 0.5 MG PEN INJ  TIOTROPIUM-OLODATEROL 2.5-2.5 MCG/ACT MIST    Medications listed below were obtained from: Henrique - spouse  PTA Medications   Medication Sig    acetaminophen (TYLENOL) 500 MG tablet   Take 1,000 mg by mouth every 4 (four) hours as needed for Pain.    anastrozole (ARIMIDEX) 1 mg Tab   Take 1 tablet (1 mg total) by mouth every morning.    ARIPiprazole (ABILIFY) 5 MG Tab   Take 5 mg by mouth once daily.    ATROVENT HFA 17 mcg/actuation inhaler   Inhale 2 puffs into the lungs every 6 (six) hours as needed for Wheezing.    DULoxetine (CYMBALTA) 60 MG capsule   Take 60 mg by mouth every evening.    gabapentin (NEURONTIN) 600 MG tablet   Take 1 tablet (600 mg total) by mouth 2 (two) times daily.    levothyroxine (SYNTHROID) 50 MCG tablet   Take 1 tablet (50 mcg total) by mouth before breakfast.    loperamide (IMODIUM) 2 mg capsule   Take 2 mg by mouth 4 (four) times daily as needed for Diarrhea (Per package directions).    metFORMIN (GLUCOPHAGE-XR) 500 MG ER 24hr tablet   Take 2 tablets (1,000 mg total) by mouth 2 (two) times daily with meals.    multivitamin (THERAGRAN) per tablet   Take 1 tablet by mouth once daily.    Ondansetron (ZOFRAN ODT) 4 " MG TbDL   Take 1 tablet (4 mg total) by mouth every 6 (six) hours as needed (nausea).    pantoprazole (PROTONIX) 40 MG tablet   Take 1 tablet (40 mg total) by mouth once daily.    propranoloL (INDERAL) 10 MG tablet Take 1 tablet (10 mg total) by mouth 2 (two) times daily. Hold medications until patient follows with PCP      thiamine 100 MG tablet   Take 1 tablet (100 mg total) by mouth once daily.    traZODone (DESYREL) 300 MG tablet   Take 1 tablet (300 mg total) by mouth every evening.    VITAMIN B-1, MONONITRATE, 100 mg Tab   Take 100 mg by mouth once daily.    divalproex (DEPAKOTE SPRINKLE) 125 mg capsule   Take 250 mg by mouth 2 (two) times daily.    topiramate (TOPAMAX) 50 MG tablet Take 50 mg by mouth every evening.       Potential issues to be addressed PRIOR TO DISCHARGE  Patient reported not taking the following medications: (PREDNISONE). These medications remain on the home medication list. Please address accordingly.   Patient requested refills for the following medications: (INDERAL)    Quinton Pantoja  [id number]                .

## 2022-12-23 NOTE — CONSULTS
[facility]  DEPARTMENT OF PSYCHIATRY    ADDICTION CONSULT INITIAL EVALUATION     SITE: [facility] [address]    12/23/2022 11:34 AM  Earl Abdul  1960  [id number]    DATE OF ADMISSION: 12/23/2022  4:19 AM  LENGTH OF STAY: 0 days    EXAMINING PRACTITIONER: Luis Pena    Inpatient consult to Psychiatry  Consult performed by: Luis Pena MD  Consult ordered by: Johnathon Avendaño DO        KEY:     I[]I Y = II[x][]II = Yes / Present / Present Though Denies / Endorses  I[]I N = II[][x]II = No / Absent / Absent Though Endorses / Denies  I[]I U = II[][]II = Unknown / Unable to Assess/Enact / Unwilling to Participate/Provide  I[]I A = II[x][x]II = Ambiguity / Uncertainty of Accuracy Exists  I[]I D = Denial or Minimization is Suspected/Evident  I[]I N/A = Non-Applicable    CHIEF COMPLAINT:     Patient Name: Earl Abdul  YOB: 1960  MRN: [id number]    Earl Abdul is a 62 y.o. female who is being seen by me for an initial psychiatric evaluation.  Earl Abdul presents with the chief complaint of: alcohol and/or drug addiction    CHART REVIEW:     Available documentation has been reviewed, and pertinent elements of the chart have been incorporated into this evaluation where appropriate.    Per Primary Team:  Earl Abdul is a 62-year-old female with a past medical history of alcohol use disorder, CLL/MBCL, sarcoidosis, T2DM, COPD on QHS O2, HLD, HTN, PRES, suicide attempt, pancreatitis, and migraines who presents to the emergency department for evaluation of 4 days of persistent nausea and nonbloody nonbilious emesis and 7 days of heavy binge drinking.  Patient reports drinking 1 bottle of vodka per day and says her last drink was 3-4 hours prior to arrival in the ED. patient's spouse at bedside helps provide some of the history and says that she recently spent time in a rehab facility but began drinking alcohol again 2 weeks ago and was progressively  drinking more and more.  She has a history of delirium tremens including hallucinations and 1 prior seizure during alcohol withdrawal.  Patient is denying any current auditory or visual hallucinations , but does endorse a generalized tremor and anxiety.  She also reports a 7/10 gradual onset generalized headache and says that she has headaches most days when she is drinking.     She denies fever, chills, cough, dyspnea, chest pain, abdominal pain, diarrhea, dysuria, flank pain, hematuria, blood in her stool, hematemesis, rashes, easy bleeding, constipation, abdominal distension.  She says she had something wrong with her liver previously but does not know what it was.  She denies ever having ascites.  Has been additionally says she stopped taking all of her medications 4-5 days ago including Abilify and duloxetine.    The patient's last encounter in the psychiatry department was on: Visit date not found    PRESENTATION:     HISTORY OF PRESENT ILLNESS:             .  Earl Abdul is a 62-year-old female with a past medical history of alcohol use disorder, CLL/MBCL, sarcoidosis, T2DM, COPD on QHS O2, HLD, HTN, PRES, suicide attempt, pancreatitis, and migraines.  Pt presented to ED for n/v for several days in setting of a binge alcohol use x 7 days.  Pt reports that they had a party where alcohol was served and just continued to drink in the days after.  She states that she was at [facility] a few weeks ago for a month.  Pt states that she started drinking in her 40s after her  refused to have intimate relations with her.  She states that she has been sober for 5 or more years during the interim.  She states that she has been to rehab 5 or more times and that she has had a difficult time quitting.  She is interested in going to rehab facility after hospitalization.  .      Interview is limited by patient seemingly falling asleep.  However, pt was alert and awake for attending in interim.           ".  COLLATERAL:   Extended Emergency Contact Information  Primary Emergency Contact: ChantellHenrique  Address: [address] [place] [address] [address] [place] [address] [facility]  Home Phone: [phone]  Relation: Spouse  Secondary Emergency Contact: Carlos Suazo  Mobile Phone: [phone]  Relation: Son     RELIABILITY, ACCURACY & AGENCY:      The patient is deemed to be a reliable and factually accurate historian.     Inconsistencies between patient reporting, collateral information, and/or chart review are not observed or manifest.     Legal Status:  none     ADDICTION:      SUBSTANCE USE:     I[]I Patient denies any substance use history, and none is known.  I[]I Patient unable or unwilling to provide any substance use history.     Nicotine Use: yes  Alcohol Misuse/Abuse: yes  Illicit Drug Use/Misuse/Abuse: yes  Misuse/Abuse of Prescription Medications: no        Current Alcohol Use:   I[]I U    I[]I N    I[]I Rare    I[]I Social    I[]I Exceeds Recommended Health Limits    I[]I Binge Pattern    I[x]I Daily or Near Daily    I[x]I Physiologically Dependent     Average Consumption (e.g. type, quantity, frequency): 1/2-1 bottle of 750ml vodka per day  Last drink: 12/23     Substances Used (Lifetime): marijuana     Tobacco Cessation ("Wants to Quit"):   I[]I N/A  I[]I U  II[][x]II nterested in Quitting    II[][]II Uninterested in Quitting   II[][x]II Making Efforts to Cut Back or Quit    II[][x]II Advised to Quit    II[][x]II Assistance Provided    II[][x]II Encouragement Provided    II[][x]II Motivational Interviewing Provided    II[][]II Resources Provided    II[][]II Referral Made      Meets Criteria for Substance Use Disorder: yes  Advised to Quit/Cut Back: yes  Motivated to Do So: yes     ADDITIONAL FACTORS RELATED TO ADDICTION:     Hx of IVDU: no  Hx of Accidental Overdose: no  Hx of DUI: no  Hx of Complicated Withdrawal (i.e. Seizures and/or Delirium Tremens): yes  Hx of Known/Suspected " "Substance-Induced Psychiatric Disorder: no  Hx of Detox: yes  Hx of Rehab: yes  Hx of Medication Assisted Treatment: no  Hx of Twelve Step Program (or Equivalent) Involvement: yes  Currently Exhibits Signs of Intoxication: no  Currently Exhibits Signs of Withdrawal: yes  Currently Active in Recovery: no     Substance(s) of Choice: alcohol and cannabuis  Substances Used Currently/Recently: alcohol  Duration of Sobriety/Abstinence: a few weeks  Date of Last Use of Substances: 12/23/2022  View/Acceptance of Twelve Step (or Equivalent) Programs: accepting  Social Support: yes  Spouse/Partner Consumption: yes     I[]I N/A  I[x]I Y  I[]I N  I[]I U  I[]I A  Awareness of Biomedical Complications: yes      Understands Need for Lifetime Sobriety: yes   I[]I N/A  I[x]I Y  I[]I N  I[]I U  I[]I A  Acknowledges/Accepts Addiction:   I[]I N/A  I[x]I Y  I[]I N  I[]I U  I[]I A  Cessation of Problematic Alcohol/Drug Use ("Wants to Quit"): Interested in Quitting  I[]I N/A  I[x]I Y  I[]I N  I[]I U  I[]I A  Motivation to Pursue Treatment: High        REVIEW OF SYSTEMS:      Medical Review of Systems:     Complete review of systems performed covering Constitutional, Eyes, ENT/Mouth, Cardiovascular, Respiratory, Gastrointestinal, Genitourinary, Musculoskeletal, Skin, Neurologic, Endocrine, Heme/Lymph, and Allergy/Immune.      Complete review of systems was negative with the exception of the following positive symptoms: nausea, vomitting, diarrhea        Psychiatric Review of System: Is patient experiencing any changes from baseline in:     sleep:  unk  appetite: unk   weight: unk  energy/anergy: unk  interest/pleasure/anhedonia: unk  somatic symptoms: unk  guilty/hopelessness: unk  concentration: unk  S.I.B.s/risky behavior: unk  SI/SA:  unk     anxiety/panic: unk  Agoraphobia:  unk  Social phobia:  unk  Recurrent nightmares:  unk  hyper startle response:  unk  Avoidance: unk  Recurrent thoughts:  unk  Recurrent behaviors:  unk   " "  Irritability: unk  Racing thoughts:unk  Impulsive behaviors: unk  Pressured speech:  unk     Paranoia:unk  Delusions: unk  AVH: unk     HISTORY:      Past Psychiatric:  Psychiatric Diagnoses:  anxiety and depressionm  Current Psychiatric Provider (if Applicable): Denies; reports Pain specialist "Dr Aguilera" rx medications?; Per chart review, patient was previously seen in [facility] by Dr. Cortes   Hx of Psychiatric Hospitalization: no  Hx of Outpatient Psychiatric Treatment (psychiatry/psychotherapy): yes  Psychotropic Trials: pristiq cymbalta trazodone yes  Prior Suicide Attempts: yes  Hx of Suicidal Ideation: yes  Hx of Homicidal Ideation: no  Hx of Self-Injurious Behavior (Non-Suicidal): yes  Hx of Violence: no  Documented Hx of Malingering: no     Trauma:  Hx of Trauma/Neglect/Physical Abuse/Sexual Abuse: no     Family  Family History:  denies     Social:     Grew Up Locally?: yes  Happy Childhood?: yes  Significant Developmental Delay/Disability?: no  GED/High School Dipoloma?: yes  Post High School Education?: no  Currently Employed?: no  On or Applying for Disability?: no  Functions Independently?: yes  Financially Stable?: yes  Domiciled?: yes  Lives Alone?: no  Heterosexual/Cisgender?: yes  Currently in a Romantic Relationship?: yes  Ever ?: yes  Children/Dependents?: yes  Judaism/Spiritual?: yes   History?: no  Engaged in Hobbies/Recreational Activities?: yes  Access to a Gun?: no     Legal:  Current Legal Issues: no  Past Charges/Convictions: no  Hx of Incarceration: no     Medical:  The patient's past medical history has been reviewed and updated as appropriate within the electronic medical record system.     General Medical History:           Active Ambulatory Problems     Diagnosis Date Noted    Alcohol use disorder, severe, dependence 02/07/2014    Alcohol withdrawal 02/07/2014    Essential hypertension 02/07/2014    Fibromyalgia 02/07/2014    Tobacco abuse 02/07/2014    Cannabis abuse, " in remission 09/08/2011    Sarcoidosis 03/31/2011    History of Clostridium difficile colitis 10/15/2014    Diarrhea 10/15/2014    Dehydration 10/15/2014    History of substance abuse 10/15/2014    Severe sepsis      Pyelonephritis due to Escherichia coli 10/16/2014    New onset seizure 07/20/2017    Posterior reversible encephalopathy syndrome 07/22/2017    Depression with anxiety 08/16/2017    Erythema nodosum 08/27/2013    History of sarcoidosis 07/26/2017    Hyperlipidemia 11/30/2012    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    Degenerative joint disease (DJD) of lumbar spine 11/20/2017    Decreased ROM of lumbar spine 11/14/2018    Difficulty walking 09/24/2019    VINICIO (obstructive sleep apnea)      At risk for lymphedema 10/15/2020    Malignant neoplasm of central portion of right breast in female, estrogen receptor positive 11/18/2020    COPD (chronic obstructive pulmonary disease) 04/17/2021    Incontinence of feces 11/30/2021    Low serum vitamin B12 11/30/2021    Anemia 11/30/2021    Urge incontinence of urine 11/30/2021    Hypothyroidism 11/30/2021    Type 2 diabetes mellitus with hyperglycemia 11/30/2021    Obesity (BMI 30.0-34.9) 11/30/2021    Fecal incontinence 12/21/2021    Mixed incontinence 12/21/2021    Pelvic floor tension 12/21/2021    Chronic hypoxemic respiratory failure 01/08/2022    COVID-19 01/08/2022    Hypoxia      Shortness of breath      Alcoholic ketoacidosis 04/29/2022    E coli bacteremia 05/04/2022    Lymphocytosis 06/27/2022    Migraine without aura and with status migrainosus, not intractable 06/29/2022    OAB (overactive bladder) 07/12/2022           Resolved Ambulatory Problems     Diagnosis Date Noted    Anxiety and Depression 02/07/2014    Sarcoidosis of lung 02/07/2014    Acute alcoholic hepatitis 03/31/2011    Alcoholic fatty liver 05/19/2010    Closed dislocation of ankle 01/09/2012    Contusion of back 12/17/2009    Contusion of knee 12/17/2009    Nausea alone 01/09/2011     Nonspecific abnormal results of liver function study 05/19/2010    Other and unspecified alcohol dependence, continuous drinking behavior 05/01/2011    Other and unspecified alcohol dependence, in remission 09/08/2011    Other and unspecified alcohol dependence, unspecified drinking behavior 05/31/2011    Person with feared complaint in whom no diagnosis was made 11/08/2010    Sprain of ankle, unspecified site 01/09/2012    Acute kidney injury (nontraumatic) 10/14/2014    Sepsis 10/14/2014    Elevated liver enzymes 10/16/2014    Hypokalemia 10/16/2014    Nausea 10/16/2014    Blood in stool 10/16/2014    Abdominal pain 10/16/2014    Hypophosphatemia 10/18/2014    Fungal infection of skin 10/18/2014    Hypomagnesemia 10/19/2014    Vomiting 10/25/2014    Clostridium enterocolitis 10/26/2014    Ascites 10/26/2014    Cirrhosis 12/28/2015    Hypotension 09/29/2016    Altered mental status, unspecified      Lethargy 09/30/2016    Viral upper respiratory tract infection 09/30/2016    Abdominal pain 07/20/2017    Intractable nausea and vomiting 07/20/2017    Chest pain 07/26/2017    Nausea vomiting and diarrhea 07/27/2017    Generalized abdominal pain 07/28/2017    Benign essential hypertension 11/30/2012    PUD (peptic ulcer disease) 08/16/2017    RLS (restless legs syndrome) 08/16/2017    Weakness 11/14/2018    Muscle weakness of lower extremity 09/24/2019    Greater trochanteric bursitis 10/10/2019    Suicidal ideation 10/26/2019    Thrombocytopenia 10/26/2019    Substance abuse      Intractable vomiting with nausea 02/10/2020    Hypokalemia 02/10/2020    Transaminitis 02/10/2020    Nausea and Vomiting with Epigastric Pain 04/17/2021    Prolonged QT interval 04/18/2021    Alcoholic gastritis without bleeding 04/19/2021           Past Medical History:   Diagnosis Date    Controlled type 2 diabetes mellitus without complication, without long-term current use of insulin 11/30/2021    Diverticulitis      Fatty liver      GERD  (gastroesophageal reflux disease)      Hypertension      Pancreatitis      Peptic ulcer disease      Polysubstance abuse      Seizures      Suicide attempt      Suicide ideation        Neurological:  Hx of Seizure:  yes - from alcohol withdrawal  Hx of Significant Head Trauma (e.g., Loss of Consciousness, Concussion, Coma):  no     MEDICATIONS:   Current Psychotropic Medications:      Abilify 5mg daily  Cymbalta 60mg QHS  Valium taper ongoing while in hospital      MEDICATIONS:     Current Psychotropic Medications:     I[]I N  I[]I U       Valium 10mg q6h  Ativan 2mg q4h prn tremor, anxiety, VS parameters    Scheduled and PRN Medications:   The electronic chart was reviewed and updated as appropriate.  See [email or web address] for details.    Current Facility-Administered Medications:     dextrose 10% bolus 125 mL 125 mL, 12.5 g, Intravenous, PRN, Elsa Camargo MD    dextrose 10% bolus 250 mL 250 mL, 25 g, Intravenous, PRN, Elsa Camargo MD    dextrose 5 % and 0.9 % NaCl infusion, , Intravenous, Continuous, Johnathon Avendaño, , Last Rate: 100 mL/hr at 12/23/22 0820, New Bag at 12/23/22 0820    diazePAM tablet 10 mg, 10 mg, Oral, Q6H, Johnathon Avendaño,     glucagon (human recombinant) injection 1 mg, 1 mg, Intramuscular, PRN, Johnathon West,     glucose chewable tablet 16 g, 16 g, Oral, PRN, Johnathon West, DO, 16 g at 12/23/22 0701    glucose chewable tablet 24 g, 24 g, Oral, PRN, Johnathon Avendaño, DO    [START ON 12/24/2022] levothyroxine tablet 50 mcg, 50 mcg, Oral, Before breakfast, Johnathon Avendaño DO    LORazepam injection 2 mg, 2 mg, Intravenous, Q6H PRN, Johnathon Avendaño DO, 2 mg at 12/23/22 1120    naloxone 0.4 mg/mL injection 0.02 mg, 0.02 mg, Intravenous, PRN, [place], DO    ondansetron injection 4 mg, 4 mg, Intravenous, Q6H PRN, Sg Yeboah MD    [START ON 12/24/2022] pantoprazole injection 40 mg, 40 mg, Intravenous, Q12H, Johnathon Avendaño, DO    sodium chloride 0.9% flush 10 mL, 10  mL, Intravenous, Q12H PRN, Johnathon West,     Facility-Administered Medications Ordered in Other Encounters:     albuterol sulfate nebulizer solution 2.5 mg, 2.5 mg, Nebulization, Once, Andrew Rodriguez MD    ALLERGIES:     Review of patient's allergies indicates:   Allergen Reactions    Lortab [hydrocodone-acetaminophen] Itching    Promethazine Itching and Other (See Comments)       RISK:     RELIABILITY, ACCURACY & AGENCY:     The patient is deemed to be a reliable and factually accurate historian.    Inconsistencies between patient reporting, collateral information, and/or chart review are absent.    Legal Status: The patient is currently here on a voluntary legal status.    PRESCRIPTION DRUG MONITORING:     [place]/MS  AWARE  Site reviewed - No recent discrepancies or irregularities are noted.      FIREARMS:     Access to Firearms:   See above for screening on access to firearms.      III[x]III  RISK PARAMETERS:     The following risk parameters were assessed during this evaluation:    I[]I Y  I[x]I N  I[]I U  I[]I A  Suicidal Ideation/Behavior  I[]I Y  I[x]I N  I[]I U  I[]I A  Homicidal Ideation/Behavior  I[]I Y  I[x]I N  I[]I U  I[]I A  Violence  I[]I Y  I[x]I N  I[]I U  I[]I A  Self-Injurious Behavior  I[]I Y  I[x]I N  I[]I U  I[]I A  I[]I N/A  Minimization of Symptoms Suspected/Evident  I[]I Y  I[x]I N  I[]I U  I[]I A  I[]I N/A  Exaggeration of Symptoms Suspected/Evident      III[x]III  CLINICAL RISK ASSESSMENT:     Pt is low risk.  The patient was noted to be future oriented.     Currently does not meet or exceed the threshold for psychiatric hospitalization, as the patient can be managed safely and successfully in a less restrictive level of care or the community.    III[x]III  CLINICAL RISK DETERMINATION:     The following criteria were met for involuntary psychiatric admission:   I[x]I None    I[]I Dangerous to Self    I[]I Dangerous to Others    I[]I Gravely Disabled      EXAMINATION:     VITALS:  "    Vitals:    12/23/22 0832 12/23/22 0839 12/23/22 1045 12/23/22 1100   BP: (!) 149/67  (!) 144/67 (!) 147/74   BP Location:    Right arm   Patient Position:   Lying Lying   Pulse:  109 (!) 117 (!) 118   Resp:   19 20   Temp:   99 °F (37.2 °C) 98.2 °F (36.8 °C)   TempSrc:   Oral Oral   SpO2:   (!) 92% 97%   Weight:   79.5 kg (175 lb 4.3 oz)        MENTAL STATUS EXAMINATION:     General Appearance: appropriately dressed, adequately groomed  Behavior: cooperative, appropriate eye contact, under good behavioral control  Movements and Motor Activity: no abnormal involuntary movements noted, no psychomotor agitation or retardation  Gait and Station: intact, normal gait and station, ambulates without assistance  Speech: normal rate/rhythm/volume/tone, conversational, spontaneous, coherent  Language: fluent English, repeats words/phrases, no word-finding difficulties noted  Mood: "[place] been better."  Affect: euthymic, reactive, appropriate  Thought Process: linear, goal-directed, organized, logical  Associations: intact, no loosening of associations  Thought Content: no suicidal ideation, no homicidal ideation, no paranoid ideation, no ideas of reference, no evidence of delusions or psychosis  Perceptions: no auditory or visual hallucinations  Sensorium: alert  Orientation: oriented fully to person, place, time, and situation  Recent and Remote Memory: recent memory intact, remote memory intact, no impairments noted  Attention and Concentration: intact, attentive to conversation, not distractible  Fund of Knowledge: intact, aware of current events, vocabulary appropriate  Insight: intact, demonstrates awareness of situation  Judgment: intact, behavior is adequate/appropriate to the circumstances      ASSESSMENT:       PSYCHOSOCIAL FACTORS  Stressors (Biopsychosocial, Cultural and Environmental): mental health, physical health, substance use/addiction    STRENGTHS AND LIABILITIES   Strength: Patient accepts " guidance/feedback.  Strength: Patient is expressive/articulate.  Strength: Patient is intelligent.  Liability: Patient lacks coping skills    III[x]III  INITIAL DIAGNOSES AND PROBLEMS:     Alcohol use disorder, current, severe, with withdrawal  Cannabis use disorder  General anxiety disorder      PLAN:     IMPRESSION AND RECOMMENDATIONS:     MANAGEMENT PLAN, TREATMENT GOALS, THERAPEUTIC TECHNIQUES/APPROACHES & CLINICAL REASONING    -Recommend close monitoring as patient has a history of Dts and withdrawal seizures.  - Withdrawal usually begins within 6-8 hours after an abrupt reduction in alcohol/benzo intake but may not manifest for up to 72 hours; it generally peaks within 10-30 hours and lasts for 3-7 days  - Concern for complicated withdrawal in this pt. Monitor for delirium tremens and seizures. Recommend/agree with benzo taper:  - Continue valium 10mg Q6H  - Frequent assessment with the CIWA-Ar is the standard of care and the hallmark of clinical practice  - Clinical [place] Withdrawal Assessment of Alcohol Scale (CIWA-Ar)  If CIWA is <8 and vital signs are stable, no PRN medication is required. Repeat vital signs q4 and the CIWA q8.  If CIWA is between 8 and 15, give Lorazepam 2 mg PO/IM q4 PRN and complete vital signs q2 and the CIWA q4.  If CIWA is ?15 or DBP ?110 mmHg, give Lorazepam 2 mg PO/IM q1 until patient has a CIWA of <15 or DBP <110 mmHg. CIWA and vital signs checked q1 until patients CIWA is <15 and DBP <110 mmHg  Call MD for for SBP >180, DBP >120, or HR >110 or if patient requires ?6 mg of lorazepam in 3 hours.  - Vitals q4hr; Ativan 2mg PO/IM q4hr PRN, for CIWA >8 or if 2 criteria are met: Systolic BP>160, Diastolic BP>100 or HR>100  - Vitamin supplementation - Thiamine 100 mg daily, Folate 1 mg daily, MVI daily, and Vitamin B12  - If not already ordered: CBC, CMP, liver panel (prior to initiating taper), B12, folate, iron panel, thiamine level.  - Seizure precautions. Seizures generally  occur between 12-48 hours after alcohol cessation. Monitor for hypoglycemia, hypocalcemia, and hypomagnesemia as these can predispose to seizure.  - Monitor for AMS, ataxia and nystagmus as these can be signs of Wernicke's Encephalopathy.    In cases of emergency, daily coverage provided by Acute/ER Psych MD, NP, PA, or SW, with contact numbers located in [facility] On Call Schedule.    III[x]III  PRESCRIPTION DRUG MANAGEMENT:     Prescription Drug Management entails the review, recommendation, or consideration without recommendation of medications, and as such was employed during the encounter.    Discussed, to the extent possible, diagnosis, risks and benefits of proposed treatment vs alternative treatments vs no treatment, potential side effects of these treatments and the inherent unpredictability of treatment. The patient expresses understanding of the above and displays the capacity to agree with this treatment given said understanding. Patient also agrees that, currently, the benefits outweigh the risks and would like to pursue treatment at this time.     ADDICTION COUNSELING AND MANAGEMENT:     Timely and targeted counseling is an important intervention in the treatment of substance use disorders.  Active and ongoing management is a hallmark of good clinical care.    Addiction counseling and management WAS employed during this encounter.    [x] The patient was counseled on abstinence from alcohol and substances of abuse (illicit and prescription).  [x] Harm reduction techniques were discussed, as warranted, to mitigate risk from problematic behaviors.  [x] Serial alcohol and drug laboratory testing (e.g. PETH, urine toxicology) is recommended to provide accountability, as well as to guide and refine substance abuse treatment moving forward.  [x] Relapse prevention and motivational interviewing was provided.  [x] Education was provided on 12 step recovery programs.    Written material, if  applicable, has additionally been provided, via the AVS or other pre-printed handouts.    In cases of emergency, daily coverage provided by Acute/ER Psych MD, NP, or SW, with contact numbers located in [facility] On Call Schedule    Case discussed with staff addiction psychiatrist: MD Luis FIELD MD MPH  House Officer II  [facility]  [facility]      DATA:     DIAGNOSTIC TESTING:     The chart was reviewed for recent diagnostic investigations, and pertinent results are noted below.    PERTINENT LABORATORY RESULTS:     Blood Counts, Electrolytes & Glucose: (i.e. WBC, ANC, Hemoglobin, Hematocrit, MCV, Platelets)  Lab Results   Component Value Date    WBC 32.80 (H) 12/23/2022    GRAN 16.5 (L) 12/23/2022    HGB 12.0 12/23/2022    HCT 39.4 12/23/2022    MCV 96 12/23/2022     (L) 12/23/2022     12/23/2022    K 3.6 12/23/2022    CALCIUM 9.0 12/23/2022    PHOS 2.1 (L) 12/23/2022    MG 1.9 12/23/2022    CO2 20 (L) 12/23/2022    ANIONGAP 24 (H) 12/23/2022    GLU 51 (L) 12/23/2022    HGBA1C 4.6 12/23/2022       Renal, Liver, Pancreas, Thyroid, Parathyroid, Prolactin, CPK, Lipids & Vitamin Levels: (i.e. Cr, BUN, Anion Gap, GFR, Urine Specific Gravity, Urine Protein, Microalburnin, AST, ALT, GGT, Alk Phos,Total Bili, Total Protein, Albumin, Ammonia, INR, Amylase, Lipase, TSH, Total T3, Total T4, Free T4 PTH, Prolactin, CPK, Cholesterol, Triglycerides, LDH, HDL, Vitamin B12, Folate, Vitamin D)  Lab Results   Component Value Date    CREATININE 0.8 12/23/2022    BUN 12 12/23/2022    EGFRNORACEVR >60.0 12/23/2022    SPECGRAV 1.020 12/23/2022    PROTEINUA Trace (A) 12/23/2022     (H) 12/23/2022    ALT 55 (H) 12/23/2022     (H) 04/01/2011    ALKPHOS 73 12/23/2022    BILITOT 1.0 12/23/2022    LABPROT 10.9 12/23/2022    ALBUMIN 4.8 12/23/2022    AMMONIA 15 11/20/2022    INR 1.0 12/23/2022    AMYLASE 19 (L) 10/14/2014    LIPASE 35 12/23/2022     TSH 0.619 04/29/2022    FREET4 0.90 04/18/2022    PTH 45 10/15/2014    CPK 64 01/08/2022    CHOL 163 03/10/2022    TRIG 189 (H) 03/10/2022    LDLCALC 78.2 03/10/2022    HDL 47 03/10/2022    TNMIKKSQ25 403 01/31/2022    WSFDQSFR39 403 01/31/2022    FOLATE 17.8 05/03/2022    THIAMINEBLOO 45 07/19/2017    [id number] 24 (L) 10/15/2014       Infection Diseases, Pregnancy Screenings & Drug Levels: (i.e. Hepatitis Panel, HIV, Syphilis, Urine & Blood Pregnancy Screens, beta hCG, Lithium, Valproic Acid, Carbamazepine, Lamotrigine, Phenytoin, Phenobarbital, Clozapine, Norclozapine, Clozapine + Norclozapine)   Lab Results   Component Value Date    HEPAIGM Negative 07/26/2017    HEPBIGM Positive (A) 07/26/2017    HEPCAB Negative 04/29/2022    BEH29OJDY Negative 04/29/2022    HCGQUANT <2.0 05/09/2006    LITHIUM <0.1 (L) 09/29/2016       Addiction: (i.e. Urine Toxicology, Blood Alcohol, PETH, EtG, EtS, CDT, Buprenorphine, Norbuprenorphine)  Lab Results   Component Value Date    PCDSOALCOHOL 100 (A) 04/29/2022    PCDSOBENZOD Negative 04/29/2022    BARBITURATES Negative 04/29/2022    PCDSCOMETHA Negative 04/29/2022    OPIATESCREEN Negative 04/29/2022    COCAINEMETAB Negative 04/29/2022    AMPHETAMINES Negative 04/29/2022    MARIJUANATHC Presumptive Positive (A) 04/29/2022    PCDSOPHENCYN Negative 04/29/2022    ALCOHOLMEDIC 211 (H) 12/23/2022    [id number] 1092 09/27/2022       CARDIAC:     Results for orders placed or performed during the hospital encounter of 12/23/22   EKG 12-lead    Collection Time: 12/23/22  4:56 AM    Narrative    Test Reason : R09.02,    Vent. Rate : 106 BPM     Atrial Rate : 106 BPM     P-R Int : 136 ms          QRS Dur : 086 ms      QT Int : 330 ms       P-R-T Axes : 080 074 068 degrees     QTc Int : 438 ms    Sinus tachycardia  Otherwise normal ECG  When compared with ECG of 20-NOV-2022 14:49,  T wave inversion no longer evident in Anterior leads  Confirmed by Sg STEPHENSON MD ([id number]) on 12/23/2022 8:59:42  AM    Referred By: ISIS   SELF           Confirmed By:Sg STEPHENSON MD       NEUROLOGIC:     Results for orders placed or performed during the hospital encounter of 11/20/22   CT Head Without Contrast    Narrative    EXAMINATION:  CT HEAD WITHOUT CONTRAST    CLINICAL HISTORY:  Fall.  Altered mental status.  Headache.    TECHNIQUE:  Axial CT images were obtained of the brain without intravenous contrast.  Coronal and sagittal reformations were obtained.  Automated exposure control utilized to reduce radiation dose.  Total exam DLP is 998 mGy cm.    COMPARISON:  09/26/2022    FINDINGS:  Gray-white matter differentiation is within normal limits. There is chronic involutional change.  There is mild chronic white matter microischemic change.  There is intracranial atherosclerosis no acute intracranial hemorrhage, extra-axial fluid collection, hydrocephalus, mass effect, midline shift is noted.  No large vessel territory acute ischemia is identified.  Visualized paranasal sinuses are clear.  Visualized mastoid air cells are clear.  No acute displaced calvarial fracture is identified.      Impression    1. No acute intracranial abnormalities identified.      Electronically signed by: Cruz Haddad MD  Date:    11/20/2022  Time:    15:51   Results for orders placed or performed during the hospital encounter of 06/10/22   MRI Brain Without Contrast    Narrative    EXAMINATION:  MRI BRAIN WITHOUT CONTRAST    CLINICAL HISTORY:  hx of PRES;    TECHNIQUE:  Multiplanar multisequence MR imaging of the brain was performed without intravenous contrast.    COMPARISON:  Head CT 06/10/2022, brain MRI 10/04/2017, 07/21/2017    FINDINGS:  Intracranial Compartment:    Ventricles are normal in size for age without evidence of hydrocephalus.    Ill-defined focus T2-FLAIR signal hyperintensity in the right periatrial white matter.  Few additional scattered punctate foci elsewhere within the supratentorial white matter.  These appear  similar to the prior study of 10/04/2017.  No definite new focal lesions.  No diffusion restriction to indicate an acute infarction.  No remote major vascular distribution infarct.  No recent or remote hemorrhage.  No mass effect or midline shift.    No extra-axial blood or fluid collections.    Normal vascular flow voids are preserved.    Skull/Extracranial Contents (limited evaluation):    Bone marrow signal intensity is normal.      Impression    No evidence of acute intracranial pathology.      Electronically signed by: Miguel Malagon MD  Date:    06/10/2022  Time:    09:45

## 2022-12-23 NOTE — CONSULTS
[facility] - Telemetry Stepdown  Gastroenterology  Consult Note    Patient Name: Earl Abdul  MRN: [id number]  Admission Date: 12/23/2022  Hospital Length of Stay: 0 days  Code Status: Full Code   Attending Provider: Liborio Chamorro MD   Consulting Provider: Michelle Montes MD  Primary Care Physician: Andrew Rodriguez MD  Principal Problem:Alcoholic ketoacidosis    Inpatient consult to Gastroenterology  Consult performed by: Michelle Montes MD  Consult ordered by: Johnathon Avendaño DO        Subjective:     HPI:  62 years old with alcohol use disorder, CLL/MBCL, sarcoidosis, PRES, pancreatitis, HTN and suicide attempt presenting with 4 days of nausea and vomiting. GI consulted for concerns of hematemesis.   She states heavy drinking over the past week the associate NBNB emesis and nausea with one episode of dark emesis concerning for blood.       She has been drinking 1 bottle of vodka a day with last drink 4 hours prior to ED arrival.  She was able to attend rehab according to  but started drinking again two weeks ago. Noted a history of delirium tremens including hallucinations and 1 prior seizure during alcohol withdrawal.  Patient is denying any current auditory or visual hallucinations , but does endorse a generalized tremor and anxiety.  She also reports a generalized headache and says that she has headaches most days when she is drinking.   Patient denies chest pain, shortness of breath, abdominal pain, dysuria, polyuria, fever, chills, diarrhea, hematuria, blood in her stool, hematemesis, rashes, easy bleeding, abdominal distension.    GI procedures:     EGD 4/2021; normal esophagus with gastritis   EGD 2/2020: small hiatal hernia. Gastritis.   C-scope 9/2020: unspecific colitis in ascending and sigmoid colon. Internal hemorrhoids.       Past Medical History:   Diagnosis Date    Anemia     Anemia     Controlled type 2 diabetes mellitus without complication, without long-term current use  of insulin 11/30/2021    COPD (chronic obstructive pulmonary disease)     Depression     Diverticulitis     Fatty liver     GERD (gastroesophageal reflux disease)     Hyperlipidemia     Hypertension     Pancreatitis     Peptic ulcer disease     Polysubstance abuse     Posterior reversible encephalopathy syndrome     Sarcoidosis of lung     Sarcoidosis of lung     over 30 yrs ago    Seizures     7/2017    Suicide attempt     Suicide ideation        Past Surgical History:   Procedure Laterality Date    APPENDECTOMY      BILATERAL MASTECTOMY Bilateral 10/29/2020    Procedure: MASTECTOMY, BILATERAL;  Surgeon: Baylee Kvein MD;  Location: [facility] OR [address] [place] [address];  Service: General;  Laterality: Bilateral;    BREAST REVISION SURGERY Bilateral 2/11/2021    Procedure: BREAST REVISION SURGERY;  Surgeon: Scottie Johnson MD;  Location: [facility] OR [address] [place] [address];  Service: Plastics;  Laterality: Bilateral;    COLONOSCOPY N/A 7/28/2017    Procedure: COLONOSCOPY;  Surgeon: Aaron Alvarado MD;  [facility];  Service: Endoscopy;  Laterality: N/A;    ESOPHAGOGASTRODUODENOSCOPY  10/7/2016, 11/6/2014    2016 - gastritis, duodenitis, 2014 erosive gastritis    ESOPHAGOGASTRODUODENOSCOPY N/A 2/11/2020    Procedure: ESOPHAGOGASTRODUODENOSCOPY (EGD);  Surgeon: Fawn Garrido MD;  Location: [facility];  Service: Endoscopy;  Laterality: N/A;    ESOPHAGOGASTRODUODENOSCOPY N/A 4/19/2021    Procedure: EGD (ESOPHAGOGASTRODUODENOSCOPY);  Surgeon: Paramjit Martino MD;  Location: [facility];  Service: Endoscopy;  Laterality: N/A;    FLEXIBLE SIGMOIDOSCOPY  11/06/2014    colitis    HYSTERECTOMY      IMPLANTATION OF PERMANENT SACRAL NERVE STIMULATOR N/A 7/12/2022    Procedure: INSERTION, NEUROSTIMULATOR, PERMANENT, SACRAL;  Surgeon: Juaquin Edwards MD;  Location: [facility] OR [address] [place] [address];  Service: Urology;  Laterality: N/A;  1hr    INJECTION FOR SENTINEL NODE IDENTIFICATION Right 10/29/2020    Procedure: INJECTION, FOR  SENTINEL NODE IDENTIFICATION;  Surgeon: Baylee Kevin MD;  Location: [address];  Service: General;  Laterality: Right;    INJECTION OF JOINT Right 10/10/2019    Procedure: Injection, Joint RIGHT ILIOPSOAS BURSA/TENDON INJECTION AND RIGHT GLUTEAL TENDON INJECTION WITH STEROID AND LIDOCAINE;  Surgeon: Guillaume Rico MD;  Location: [facility];  Service: Pain Management;  Laterality: Right;  NEEDS CONSENT    INSERTION OF BREAST TISSUE EXPANDER Bilateral 10/29/2020    Procedure: INSERTION, TISSUE EXPANDER, BREAST;  Surgeon: Scottie Johnson MD;  Location: [address];  Service: Plastics;  Laterality: Bilateral;  Right breast: 1082 g  Left breast: 1076 g    LIPOSUCTION Bilateral 2021    Procedure: LIPOSUCTION;  Surgeon: Scottie Johnson MD;  Location: [address];  Service: Plastics;  Laterality: Bilateral;    mediastenoscopy      REPLACEMENT OF IMPLANT OF BREAST Bilateral 2021    Procedure: REPLACEMENT, IMPLANT, BREAST;  Surgeon: Scottie Johnson MD;  Location: [address];  Service: Plastics;  Laterality: Bilateral;    SENTINEL LYMPH NODE BIOPSY Right 10/29/2020    Procedure: BIOPSY, LYMPH NODE, SENTINEL;  Surgeon: Baylee Kevin MD;  Location: [address];  Service: General;  Laterality: Right;    TONSILLECTOMY N/A 1970    TUBAL LIGATION         Review of patient's allergies indicates:   Allergen Reactions    Lortab [hydrocodone-acetaminophen] Itching    Promethazine Itching and Other (See Comments)     Family History       Problem Relation (Age of Onset)    Breast cancer Maternal Aunt, Daughter    Colon cancer Maternal Uncle    Diabetes Father, Mother    Heart attack Father    Hypertension Father, Mother          Tobacco Use    Smoking status: Former     Packs/day: 0.50     Years: 30.00     Pack years: 15.00     Types: Vaping with nicotine, Cigarettes     Quit date: 2021     Years since quittin.8    Smokeless tobacco: Never   Substance and  Sexual Activity    Alcohol use: Yes     Comment: vodka daily (half a regular bottle)    Drug use: Yes     Types: Marijuana     Comment: gummies    Sexual activity: Yes     Birth control/protection: Surgical     Review of Systems   Constitutional:  Negative for chills and fever.   HENT:  Negative for congestion and facial swelling.    Eyes:  Negative for visual disturbance.   Respiratory:  Negative for cough and shortness of breath.    Cardiovascular:  Negative for chest pain and leg swelling.   Gastrointestinal:  Positive for diarrhea, nausea and vomiting.   Genitourinary:  Negative for difficulty urinating and dysuria.   Musculoskeletal:  Negative for arthralgias.   Skin:  Negative for rash.   Neurological:  Positive for headaches.   Psychiatric/Behavioral:  Negative for agitation.    Objective:     Vital Signs (Most Recent):  Temp: 98.3 °F (36.8 °C) (12/23/22 0632)  Pulse: 109 (12/23/22 0839)  Resp: 20 (12/23/22 0723)  BP: (!) 149/67 (12/23/22 0832)  SpO2: 95 % (12/23/22 0723)   Vital Signs (24h Range):  Temp:  [98.1 °F (36.7 °C)-98.3 °F (36.8 °C)] 98.3 °F (36.8 °C)  Pulse:  [108-121] 109  Resp:  [18-25] 20  SpO2:  [91 %-100 %] 95 %  BP: (148-174)/(65-84) 149/67        There is no height or weight on file to calculate BMI.      Intake/Output Summary (Last 24 hours) at 12/23/2022 1004  Last data filed at 12/23/2022 0607  Gross per 24 hour   Intake 999 ml   Output --   Net 999 ml       Lines/Drains/Airways       Peripheral Intravenous Line  Duration                  Peripheral IV - Single Lumen 12/23/22 0439 20 G Posterior;Right Forearm <1 day                    Physical Exam  Constitutional:       General: She is not in acute distress.  HENT:      Head: Normocephalic and atraumatic.      Mouth/Throat:      Mouth: Mucous membranes are moist.      Pharynx: Oropharynx is clear.   Eyes:      General: No scleral icterus.     Extraocular Movements: Extraocular movements intact.      Conjunctiva/sclera: Conjunctivae  normal.   Cardiovascular:      Rate and Rhythm: Normal rate and regular rhythm.      Heart sounds: Normal heart sounds.   Pulmonary:      Effort: Pulmonary effort is normal.      Breath sounds: Normal breath sounds. No wheezing or rales.      Comments: 2 L NC    Abdominal:      General: Abdomen is flat. Bowel sounds are normal.      Palpations: Abdomen is soft.   Musculoskeletal:         General: Normal range of motion.      Cervical back: Normal range of motion.      Right lower leg: No edema.      Left lower leg: No edema.   Skin:     General: Skin is warm and dry.   Neurological:      General: No focal deficit present.      Mental Status: She is alert.      Comments: tremulous   Psychiatric:         Mood and Affect: Mood normal.       Significant Labs:  Acute Hepatitis Panel: No results for input(s): HEPBSAG, HEPAIGM, HEPCAB in the last 48 hours.    Invalid input(s): HAPBIGM  Amylase: No results for input(s): AMYLASE in the last 48 hours.  Blood Culture: No results for input(s): LABBLOO in the last 48 hours.  CBC:   Recent Labs   Lab 12/23/22  0511   WBC 32.80*   HGB 12.0   HCT 39.4   *     BMP:   Recent Labs   Lab 12/23/22 0511   GLU 51*      K 3.6   CL 94*   CO2 20*   BUN 12   CREATININE 0.8   CALCIUM 9.0   MG 1.9     CMP:   Recent Labs   Lab 12/23/22 0511   GLU 51*   CALCIUM 9.0   ALBUMIN 4.8   PROT 8.0      K 3.6   CO2 20*   CL 94*   BUN 12   CREATININE 0.8   ALKPHOS 73   ALT 55*   *   BILITOT 1.0     Coagulation:   Recent Labs   Lab 12/23/22  0724   INR 1.0     CRP: No results for input(s): CRP in the last 48 hours.  ESR: No results for input(s): SEDRATE in the last 48 hours.  H.Pylori Ab IgG: No results for input(s): HPYLORIIGG in the last 48 hours.  Lipase:   Recent Labs   Lab 12/23/22 0511   LIPASE 35     Liver Function Test:   Recent Labs   Lab 12/23/22 0511   ALT 55*   *   ALKPHOS 73   BILITOT 1.0   PROT 8.0   ALBUMIN 4.8     Peritoneal Fluid Cultures: No results for  input(s): AEROBICCULTU, LABGRAM in the last 48 hours.  Stool C. diff: No results for input(s): CDIFFICILEAN, CDIFFTOX in the last 48 hours.  Stool Culture: No results for input(s): STOOLCULTURE in the last 48 hours.  Stool Ova/Cysts/Parasites: No results for input(s): STLEXAMOCP in the last 48 hours.  Stool Giardia/Crypto: No results for input(s): GIARDIAANTIG, CRSPAG in the last 48 hours.  Stool WBCs: No results for input(s): STOOLWBC in the last 48 hours.  Stool Lactoferrin: No results for input(s): LACTOFERRINS in the last 48 hours.  All pertinent lab results from the last 24 hours have been reviewed.    Significant Imaging:  Imaging results within the past 24 hours have been reviewed.    Assessment/Plan:     Bloody emesis  62-year-old with history of alcohol abuse presents with alcohol intoxications and recurrent nausea vomiting.    Patient's nausea vomiting likely related to alcohol abuse possibilities of infectious gastroenteritis.    LFTs are mildly elevated, elevated WBC, lipase within normal range.  Given reported small amount of blood in her last episode of vomiting, concerns for mild GI bleeding due to recurrent nausea and vomiting.  Less clinically consistent with varcieal bleeding.   Given most recent ultrasound and clinical findings less concerned about cirrhosis.     EGD 4/2021; normal esophagus with gastritis   EGD 2/2020: small hiatal hernia. Gastritis.   C-scope 9/2020: unspecific colitis in ascending and sigmoid colon. Internal hemorrhoids.     -- RUQ US pending   -- continue alcohol withdrawal management  -- anti emetics PRNs  -- continue PPIs 40 mg b.i.d.  -- okay for clear liquids  -- continue monitoring, if recurrent hematemesis or melena and worsening anemia, will consider further evaluation with EGD. Ideally after patient is stable and no longer in alcohol withdrawal.        Thank you for your consult. I will follow-up with patient. Please contact us if you have any additional  questions.    Michelle Montes MD  Gastroenterology  [facility] - Telemetry Stepdown

## 2022-12-23 NOTE — ASSESSMENT & PLAN NOTE
Patient with significant hx of alcohol abuse requiring multiple hospitalizations as well as several rehab admissions per  presetns after 1x week of no eating and binge drinking a 5th of vodka per day. Patient is AAOx3 but tremulous. Last drink was 6-8 hours prior.      Plan    - CIWA driven as below   - Thiamine IV given in  - Monitor daily CMP and closely monitor electrolytes  - Seizure precautions and CIWA q4 ordered    - PO Valium standing 10mg TID for now, increased to q6 per psych recs    - PRN ativan for breakthrough symptoms tremors noted thus far            Ciwa driven     CIWA,   Frequency          Medication    D            ose    < 8      Q4H x 6 - If CIWA-AR score < 8, stop None  8?14    Q2H           Lorazepam  1 mg  15?20  Q1H           Lorazepam 2 mg  21?30  Q1H           Lorazepam 3 mg  31?45  Q1H           Lorazepam4 mg  [id number] Minutes          Lorazepam 2 mg

## 2022-12-23 NOTE — SUBJECTIVE & OBJECTIVE
Past Medical History:   Diagnosis Date    Anemia     Anemia     Controlled type 2 diabetes mellitus without complication, without long-term current use of insulin 11/30/2021    COPD (chronic obstructive pulmonary disease)     Depression     Diverticulitis     Fatty liver     GERD (gastroesophageal reflux disease)     Hyperlipidemia     Hypertension     Pancreatitis     Peptic ulcer disease     Polysubstance abuse     Posterior reversible encephalopathy syndrome     Sarcoidosis of lung     Sarcoidosis of lung     over 30 yrs ago    Seizures     7/2017    Suicide attempt     Suicide ideation        Past Surgical History:   Procedure Laterality Date    APPENDECTOMY      BILATERAL MASTECTOMY Bilateral 10/29/2020    Procedure: MASTECTOMY, BILATERAL;  Surgeon: Baylee Kevin MD;  Location: [address];  Service: General;  Laterality: Bilateral;    BREAST REVISION SURGERY Bilateral 2/11/2021    Procedure: BREAST REVISION SURGERY;  Surgeon: Scottie Johnson MD;  Location: [facility] OR [address] [place] [address];  Service: Plastics;  Laterality: Bilateral;    COLONOSCOPY N/A 7/28/2017    Procedure: COLONOSCOPY;  Surgeon: Aaron Alvarado MD;  Location: [facility];  Service: Endoscopy;  Laterality: N/A;    ESOPHAGOGASTRODUODENOSCOPY  10/7/2016, 11/6/2014    2016 - gastritis, duodenitis, 2014 erosive gastritis    ESOPHAGOGASTRODUODENOSCOPY N/A 2/11/2020    Procedure: ESOPHAGOGASTRODUODENOSCOPY (EGD);  Surgeon: Fawn Garrido MD;  Location: [facility];  Service: Endoscopy;  Laterality: N/A;    ESOPHAGOGASTRODUODENOSCOPY N/A 4/19/2021    Procedure: EGD (ESOPHAGOGASTRODUODENOSCOPY);  Surgeon: Paramjit Martino MD;  Location: [facility];  Service: Endoscopy;  Laterality: N/A;    FLEXIBLE SIGMOIDOSCOPY  11/06/2014    colitis    HYSTERECTOMY      IMPLANTATION OF PERMANENT SACRAL NERVE STIMULATOR N/A 7/12/2022    Procedure: INSERTION, NEUROSTIMULATOR, PERMANENT, SACRAL;  Surgeon: Juaquin Edwards MD;  Location: [facility] OR [address] [place] [address];  Service:  Urology;  Laterality: N/A;  1hr    INJECTION FOR SENTINEL NODE IDENTIFICATION Right 10/29/2020    Procedure: INJECTION, FOR SENTINEL NODE IDENTIFICATION;  Surgeon: Baylee Kevin MD;  Location: [facility] OR [address] [place] [address];  Service: General;  Laterality: Right;    INJECTION OF JOINT Right 10/10/2019    Procedure: Injection, Joint RIGHT ILIOPSOAS BURSA/TENDON INJECTION AND RIGHT GLUTEAL TENDON INJECTION WITH STEROID AND LIDOCAINE;  Surgeon: Guillaume Rico MD;  Location: [facility];  Service: Pain Management;  Laterality: Right;  NEEDS CONSENT    INSERTION OF BREAST TISSUE EXPANDER Bilateral 10/29/2020    Procedure: INSERTION, TISSUE EXPANDER, BREAST;  Surgeon: Scottie Johnson MD;  Location: [address];  Service: Plastics;  Laterality: Bilateral;  Right breast: 1082 g  Left breast: 1076 g    LIPOSUCTION Bilateral 2/11/2021    Procedure: LIPOSUCTION;  Surgeon: Scottie Johnson MD;  Location: [facility] OR [address] [place] [address];  Service: Plastics;  Laterality: Bilateral;    mediastenoscopy      REPLACEMENT OF IMPLANT OF BREAST Bilateral 2/11/2021    Procedure: REPLACEMENT, IMPLANT, BREAST;  Surgeon: Scottie Johnson MD;  Location: [address];  Service: Plastics;  Laterality: Bilateral;    SENTINEL LYMPH NODE BIOPSY Right 10/29/2020    Procedure: BIOPSY, LYMPH NODE, SENTINEL;  Surgeon: Baylee Kevin MD;  Location: [address];  Service: General;  Laterality: Right;    TONSILLECTOMY N/A 1970    TUBAL LIGATION         Review of patient's allergies indicates:   Allergen Reactions    Lortab [hydrocodone-acetaminophen] Itching    Promethazine Itching and Other (See Comments)       Current Facility-Administered Medications on File Prior to Encounter   Medication    albuterol sulfate nebulizer solution 2.5 mg     Current Outpatient Medications on File Prior to Encounter   Medication Sig    acetaminophen (TYLENOL) 500 MG tablet Take 1,000 mg by mouth every 4 (four) hours as needed for Pain.    ARIPiprazole (ABILIFY) 5 MG  Tab Take 5 mg by mouth once daily.    atorvastatin (LIPITOR) 10 MG tablet Take 10 mg by mouth every morning.    chlorzoxazone (PARAFON FORTE) 500 mg Tab Take 500 mg by mouth daily as needed (muscle spasms).    DULoxetine (CYMBALTA) 60 MG capsule Take 60 mg by mouth every evening.    folic acid (FOLVITE) 1 MG tablet Take 1 tablet (1 mg total) by mouth once daily.    gabapentin (NEURONTIN) 600 MG tablet Take 1 tablet (600 mg total) by mouth 2 (two) times daily.    levothyroxine (SYNTHROID) 50 MCG tablet Take 1 tablet (50 mcg total) by mouth before breakfast.    lisinopriL (PRINIVIL,ZESTRIL) 20 MG tablet Take 1 tablet (20 mg total) by mouth every morning. Hold medications until patient follows with PCP    metFORMIN (GLUCOPHAGE-XR) 500 MG ER 24hr tablet Take 2 tablets (1,000 mg total) by mouth 2 (two) times daily with meals.    multivitamin (THERAGRAN) per tablet Take 1 tablet by mouth once daily.    ondansetron (ZOFRAN ODT) 4 MG TbDL Take 1 tablet (4 mg total) by mouth every 6 (six) hours as needed (nausea).    ondansetron (ZOFRAN-ODT) 4 MG TbDL Take 2 tablets (8 mg total) by mouth every 12 (twelve) hours as needed (Nausea).    pantoprazole (PROTONIX) 40 MG tablet Take 1 tablet (40 mg total) by mouth once daily.    predniSONE (DELTASONE) 10 MG tablet 4 tabs/day for 4 days then 3 tabs/day for 4 days then 2tabs/day for 2 days    propranoloL (INDERAL) 10 MG tablet Take 1 tablet (10 mg total) by mouth 2 (two) times daily. Hold medications until patient follows with PCP    semaglutide (OZEMPIC) 0.25 mg or 0.5 mg(2 mg/1.5 mL) pen injector Inject 0.5 mg into the skin every 7 days. Start with 0.25 mg weekly for 4 weeks and then increase to 0.5 mg if you are tolerating this dose    thiamine 100 MG tablet Take 1 tablet (100 mg total) by mouth once daily.    traZODone (DESYREL) 300 MG tablet Take 1 tablet (300 mg total) by mouth every evening.    anastrozole (ARIMIDEX) 1 mg Tab Take 1 tablet (1 mg total) by mouth every morning.     ATROVENT HFA 17 mcg/actuation inhaler Inhale 2 puffs into the lungs every 6 (six) hours as needed for Wheezing.    famotidine (PEPCID) 20 MG tablet Take 1 tablet (20 mg total) by mouth 2 (two) times daily.    tiotropium-olodateroL (STIOLTO RESPIMAT) 2.5-2.5 mcg/actuation Mist Inhale 1 puff into the lungs 2 (two) times a day. Controller (Patient not taking: Reported on 10/13/2022)     Family History       Problem Relation (Age of Onset)    Breast cancer Maternal Aunt, Daughter    Colon cancer Maternal Uncle    Diabetes Father, Mother    Heart attack Father    Hypertension Father, Mother          Tobacco Use    Smoking status: Former     Packs/day: 0.50     Years: 30.00     Pack years: 15.00     Types: Vaping with nicotine, Cigarettes     Quit date: 2021     Years since quittin.8    Smokeless tobacco: Never   Substance and Sexual Activity    Alcohol use: Yes     Comment: vodka daily (half a regular bottle)    Drug use: Yes     Types: Marijuana     Comment: gummies    Sexual activity: Yes     Birth control/protection: Surgical     Review of Systems   Constitutional:  Positive for appetite change, chills and fatigue.   HENT:  Negative for congestion, nosebleeds, rhinorrhea, sneezing and sore throat.    Cardiovascular:  Positive for palpitations. Negative for chest pain and leg swelling.   Gastrointestinal:  Positive for nausea and vomiting. Negative for abdominal distention and abdominal pain.   Endocrine: Negative.    Genitourinary: Negative.    Musculoskeletal:  Negative for arthralgias, back pain, joint swelling and myalgias.   Skin: Negative.    Allergic/Immunologic: Negative.    Neurological:  Positive for tremors, weakness and headaches. Negative for seizures and facial asymmetry.   Psychiatric/Behavioral:  Negative for agitation, confusion, decreased concentration and dysphoric mood. The patient is not hyperactive.    Objective:     Vital Signs (Most Recent):  Temp: 98.2 °F (36.8 °C) (22  1100)  Pulse: (!) 118 (12/23/22 1100)  Resp: 20 (12/23/22 1100)  BP: (!) 147/74 (12/23/22 1100)  SpO2: 97 % (12/23/22 1100)   Vital Signs (24h Range):  Temp:  [98.1 °F (36.7 °C)-99 °F (37.2 °C)] 98.2 °F (36.8 °C)  Pulse:  [108-121] 118  Resp:  [18-25] 20  SpO2:  [91 %-100 %] 97 %  BP: (144-174)/(65-84) 147/74     Weight: 79.5 kg (175 lb 4.3 oz)  Body mass index is 28.29 kg/m².    Physical Exam  Constitutional:       General: She is in acute distress.      Appearance: Normal appearance. She is ill-appearing. She is not toxic-appearing or diaphoretic.   HENT:      Head: Normocephalic and atraumatic.      Nose: Nose normal.      Mouth/Throat:      Mouth: Mucous membranes are moist.      Pharynx: Oropharynx is clear.   Cardiovascular:      Rate and Rhythm: Regular rhythm. Tachycardia present.   Pulmonary:      Effort: No respiratory distress.      Breath sounds: No wheezing.   Abdominal:      General: There is no distension.      Tenderness: There is no abdominal tenderness. There is no guarding.   Musculoskeletal:         General: No swelling or tenderness.      Right lower leg: No edema.      Left lower leg: No edema.   Skin:     Coloration: Skin is not jaundiced.      Findings: Bruising (Abdomen, nontender) present.   Neurological:      General: No focal deficit present.      Mental Status: She is alert and oriented to person, place, and time.           Significant Labs: All pertinent labs within the past 24 hours have been reviewed.    Significant Imaging: I have reviewed all pertinent imaging results/findings within the past 24 hours.

## 2022-12-23 NOTE — ED PROVIDER NOTES
Encounter Date: 12/23/2022       History     Chief Complaint   Patient presents with    Drug / Alcohol Assessment     Pt states that she has been drinking heavily for the past two weeks. Pt states that she drinks a fifth a day. Pt presents to ED with c/o of nausea and vomiting. Pt last drink 2 hours ago. Pt states she is depressed but denies SI or HI.      Earl Abdul is a 62-year-old female with a past medical history of alcohol use disorder, CLL/MBCL, sarcoidosis, T2DM, COPD on QHS O2, HLD, HTN, PRES, suicide attempt, pancreatitis, and migraines who presents to the emergency department for evaluation of 4 days of persistent nausea and nonbloody nonbilious emesis and 7 days of heavy binge drinking.  Patient reports drinking 1 bottle of vodka per day and says her last drink was 3-4 hours prior to arrival in the ED. patient's spouse at bedside helps provide some of the history and says that she recently spent time in a rehab facility but began drinking alcohol again 2 weeks ago and was progressively drinking more and more.  She has a history of delirium tremens including hallucinations and 1 prior seizure during alcohol withdrawal.  Patient is denying any current auditory or visual hallucinations , but does endorse a generalized tremor and anxiety.  She also reports a 7/10 gradual onset generalized headache and says that she has headaches most days when she is drinking.    She denies fever, chills, cough, dyspnea, chest pain, abdominal pain, diarrhea, dysuria, flank pain, hematuria, blood in her stool, hematemesis, rashes, easy bleeding, constipation, abdominal distension.  She says she had something wrong with her liver previously but does not know what it was.  She denies ever having ascites.  Has been additionally says she stopped taking all of her medications 4-5 days ago including Abilify and duloxetine.    The history is provided by the patient and the spouse. No  was used.   Review  of patient's allergies indicates:   Allergen Reactions    Lortab [hydrocodone-acetaminophen] Itching    Promethazine Itching and Other (See Comments)     Past Medical History:   Diagnosis Date    Anemia     Anemia     Controlled type 2 diabetes mellitus without complication, without long-term current use of insulin 11/30/2021    COPD (chronic obstructive pulmonary disease)     Depression     Diverticulitis     Fatty liver     GERD (gastroesophageal reflux disease)     Hyperlipidemia     Hypertension     Pancreatitis     Peptic ulcer disease     Polysubstance abuse     Posterior reversible encephalopathy syndrome     Sarcoidosis of lung     Sarcoidosis of lung     over 30 yrs ago    Seizures     7/2017    Suicide attempt     Suicide ideation      Past Surgical History:   Procedure Laterality Date    APPENDECTOMY      BILATERAL MASTECTOMY Bilateral 10/29/2020    Procedure: MASTECTOMY, BILATERAL;  Surgeon: Baylee Kevin MD;  Location: [facility] OR [address] [place] [address];  Service: General;  Laterality: Bilateral;    BREAST REVISION SURGERY Bilateral 2/11/2021    Procedure: BREAST REVISION SURGERY;  Surgeon: Scottie Johnson MD;  Location: [facility] OR [address] [place] [address];  Service: Plastics;  Laterality: Bilateral;    COLONOSCOPY N/A 7/28/2017    Procedure: COLONOSCOPY;  Surgeon: Aaron Alvarado MD;  Location: [facility];  Service: Endoscopy;  Laterality: N/A;    ESOPHAGOGASTRODUODENOSCOPY  10/7/2016, 11/6/2014    2016 - gastritis, duodenitis, 2014 erosive gastritis    ESOPHAGOGASTRODUODENOSCOPY N/A 2/11/2020    Procedure: ESOPHAGOGASTRODUODENOSCOPY (EGD);  Surgeon: Fawn Garrido MD;  Location: [facility];  Service: Endoscopy;  Laterality: N/A;    ESOPHAGOGASTRODUODENOSCOPY N/A 4/19/2021    Procedure: EGD (ESOPHAGOGASTRODUODENOSCOPY);  Surgeon: Paramjit Martino MD;  Location: [facility];  Service: Endoscopy;  Laterality: N/A;    FLEXIBLE SIGMOIDOSCOPY  11/06/2014    colitis    HYSTERECTOMY      IMPLANTATION OF  PERMANENT SACRAL NERVE STIMULATOR N/A 7/12/2022    Procedure: INSERTION, NEUROSTIMULATOR, PERMANENT, SACRAL;  Surgeon: Juaquin Edwards MD;  Location: [facility] OR [address] [place] [address];  Service: Urology;  Laterality: N/A;  1hr    INJECTION FOR SENTINEL NODE IDENTIFICATION Right 10/29/2020    Procedure: INJECTION, FOR SENTINEL NODE IDENTIFICATION;  Surgeon: Baylee Kevin MD;  Location: [address];  Service: General;  Laterality: Right;    INJECTION OF JOINT Right 10/10/2019    Procedure: Injection, Joint RIGHT ILIOPSOAS BURSA/TENDON INJECTION AND RIGHT GLUTEAL TENDON INJECTION WITH STEROID AND LIDOCAINE;  Surgeon: Guillaume Rico MD;  Location: [facility];  Service: Pain Management;  Laterality: Right;  NEEDS CONSENT    INSERTION OF BREAST TISSUE EXPANDER Bilateral 10/29/2020    Procedure: INSERTION, TISSUE EXPANDER, BREAST;  Surgeon: Scottie Johnson MD;  Location: [address];  Service: Plastics;  Laterality: Bilateral;  Right breast: 1082 g  Left breast: 1076 g    LIPOSUCTION Bilateral 2/11/2021    Procedure: LIPOSUCTION;  Surgeon: Scottie Johnson MD;  Location: [address];  Service: Plastics;  Laterality: Bilateral;    mediastenoscopy      REPLACEMENT OF IMPLANT OF BREAST Bilateral 2/11/2021    Procedure: REPLACEMENT, IMPLANT, BREAST;  Surgeon: Scottie Johnson MD;  Location: [address];  Service: Plastics;  Laterality: Bilateral;    SENTINEL LYMPH NODE BIOPSY Right 10/29/2020    Procedure: BIOPSY, LYMPH NODE, SENTINEL;  Surgeon: Baylee Kevin MD;  Location: [address];  Service: General;  Laterality: Right;    TONSILLECTOMY N/A 1970    TUBAL LIGATION       Family History   Problem Relation Age of Onset    Heart attack Father     Diabetes Father     Hypertension Father     Diabetes Mother     Hypertension Mother     Breast cancer Maternal Aunt     Colon cancer Maternal Uncle     Breast cancer Daughter     Ovarian cancer Neg Hx     Cancer Neg Hx      Social  History     Tobacco Use    Smoking status: Former     Packs/day: 0.50     Years: 30.00     Pack years: 15.00     Types: Vaping with nicotine, Cigarettes     Quit date: 2021     Years since quittin.8    Smokeless tobacco: Never   Substance Use Topics    Alcohol use: Yes     Comment: vodka daily (half a regular bottle)    Drug use: Yes     Types: Marijuana     Comment: gummies     Review of Systems   Constitutional:  Negative for chills and fever.   HENT:  Negative for congestion and sore throat.    Eyes:  Negative for pain and redness.   Respiratory:  Negative for cough, chest tightness and shortness of breath.    Cardiovascular:  Negative for chest pain and leg swelling.   Gastrointestinal:  Positive for nausea and vomiting. Negative for abdominal distention, abdominal pain, blood in stool, constipation and diarrhea.   Genitourinary:  Negative for dysuria, flank pain and hematuria.   Musculoskeletal:  Negative for back pain and neck pain.   Skin:  Negative for color change and rash.   Neurological:  Positive for tremors and headaches. Negative for dizziness, speech difficulty and light-headedness.   Psychiatric/Behavioral:  Negative for hallucinations. The patient is nervous/anxious.         Positive for binge drinking     Physical Exam     Initial Vitals [22 0417]   BP Pulse Resp Temp SpO2   (!) 148/68 (!) 121 18 98.1 °F (36.7 °C) (!) 94 %      MAP       --         Physical Exam    Nursing note and vitals reviewed.  Constitutional: She is not diaphoretic.   Mildly tremulous and anxious appearing.  Cooperative with questions and exam but appears intoxicated with slightly slurred speech   HENT:   Head: Normocephalic and atraumatic.   Right Ear: External ear normal.   Left Ear: External ear normal.   Nose: Nose normal.   Dry mucous membranes   Eyes: Conjunctivae and EOM are normal. Pupils are equal, round, and reactive to light. No scleral icterus.   Neck: Neck supple.   Cardiovascular:             Tachycardic   Pulmonary/Chest: No stridor.   Oxygen intermittently desaturating to mid 80s on room air, patient placed on nasal cannula 2 L O2.  Tachypneic, lungs clear to auscultation bilaterally throughout.  No wheezes, rales, or rhonchi.   Abdominal: Abdomen is soft. There is no abdominal tenderness.   Obese abdomen There is no rebound and no guarding.   Musculoskeletal:         General: No edema.      Cervical back: Neck supple.     Neurological: She is alert and oriented to person, place, and time. No cranial nerve deficit or sensory deficit. GCS score is 15. GCS eye subscore is 4. GCS verbal subscore is 5. GCS motor subscore is 6.   Skin: Skin is warm and dry. Capillary refill takes 2 to 3 seconds. No rash noted.   No jaundice       ED Course   Procedures  Labs Reviewed   BETA - HYDROXYBUTYRATE, SERUM - Abnormal; Notable for the following components:       Result Value    Beta-Hydroxybutyrate 6.7 (*)     All other components within normal limits   CBC W/ AUTO DIFFERENTIAL - Abnormal; Notable for the following components:    WBC 32.80 (*)     MCHC 30.5 (*)     RDW 17.2 (*)     Platelets 104 (*)     Gran % 16.5 (*)     Lymph % 82.5 (*)     Mono % 0.5 (*)     Platelet Estimate Decreased (*)     All other components within normal limits    Narrative:     add on phos per dr servando banegas/order#[id number] @06:00 12/23/2022   COMPREHENSIVE METABOLIC PANEL - Abnormal; Notable for the following components:    Chloride 94 (*)     CO2 20 (*)     Glucose 51 (*)      (*)     ALT 55 (*)     Anion Gap 24 (*)     All other components within normal limits    Narrative:     add on phos per dr servando banegas/order#[id number] @06:00 12/23/2022   ALCOHOL,MEDICAL (ETHANOL) - Abnormal; Notable for the following components:    Alcohol, Serum 211 (*)     All other components within normal limits   PHOSPHORUS - Abnormal; Notable for the following components:    Phosphorus 2.1 (*)     All other components within normal limits     Narrative:     add on phos per dr servando banegas/order#[id number] @06:00 12/23/2022   ISTAT PROCEDURE - Abnormal; Notable for the following components:    POC PH 7.270 (*)     POC PCO2 54.6 (*)     POC PO2 32 (*)     POC SATURATED O2 53 (*)     All other components within normal limits   MAGNESIUM   TROPONIN I   LIPASE   PHOSPHORUS   URINALYSIS, REFLEX TO URINE CULTURE          Imaging Results              X-Ray Chest AP Portable (Final result)  Result time 12/23/22 04:58:27      Final result by Aayush Hoffman MD (12/23/22 04:58:27)                   Impression:      No convincing radiographic evidence of acute intrathoracic process on this single view.      Electronically signed by: Aayush Hoffman MD  Date:    12/23/2022  Time:    04:58               Narrative:    EXAMINATION:  XR CHEST AP PORTABLE    CLINICAL HISTORY:  Hypoxemia    TECHNIQUE:  Single frontal view of the chest was performed.    COMPARISON:  Chest radiograph 09/26/2022, CT chest 03/25/2022    FINDINGS:  There is soft tissue attenuation relating to body habitus as well as bilateral breast prosthesis.  Cardiac silhouette is stable in size.  There is mild atherosclerotic calcification of the thoracic aorta.  Lung volumes are mildly diminished with bibasilar atelectasis.  No new large confluent airspace consolidation appreciated.  No significant volume of pleural fluid or pneumothorax appreciated.  Osseous structures demonstrate degenerative changes.                                       Medications   dextrose 5 % and 0.9 % NaCl infusion ( Intravenous New Bag 12/23/22 0635)   sodium chloride 0.9% flush 10 mL (has no administration in time range)   naloxone 0.4 mg/mL injection 0.02 mg (has no administration in time range)   glucose chewable tablet 16 g (16 g Oral Given 12/23/22 0701)   glucose chewable tablet 24 g (has no administration in time range)   glucagon (human recombinant) injection 1 mg (has no administration in time range)   enoxaparin  injection 40 mg (has no administration in time range)   dextrose 10% bolus 125 mL 125 mL (has no administration in time range)   dextrose 10% bolus 250 mL 250 mL (has no administration in time range)   sodium chloride 0.9% bolus 1,000 mL 1,000 mL (0 mLs Intravenous Stopped 12/23/22 0607)   LORazepam injection 2 mg (2 mg Intravenous Given 12/23/22 0510)   metoclopramide HCl tablet 10 mg (10 mg Oral Given 12/23/22 0524)   thiamine (B-1) 100 mg in dextrose 5 % 100 mL IVPB (100 mg Intravenous New Bag 12/23/22 0639)     Medical Decision Making:   History:   Old Medical Records: I decided to obtain old medical records.  Old Records Summarized: records from clinic visits, records from previous admission(s) and records from another hospital.  Initial Assessment:   Patient was tachycardic, hypoxic, hypotensive, and tremulous complaining of persistent nausea and vomiting over the past several days.  Differential Diagnosis:   Differential diagnoses considered include, but not limited to:   alcohol withdrawal, alcoholic ketoacidosis, alcoholic hepatitis, aspiration pneumonia, pancreatitis      Clinical Tests:   Lab Tests: Ordered and Reviewed  Radiological Study: Ordered and Reviewed  ED Management:  Patient's CIWA score was 16.  2 mg IV Ativan given.  Reglan given for nausea as patient states it works better for her than Zofran.    Labs consistent with acute alcoholic ketoacidosis.  100 mg thiamine IV and D5 NS ordered    X-ray does not demonstrate any consolidation.  Alcohol level elevated at 211.    Patient was put on CIWA protocol.  Patient admitted to Hospital Medicine for further evaluation and management of her alcohol ketoacidosis and alcohol withdrawal.  I discussed this with the patient and her  at bedside who expressed understanding and agreement with the plan.              Attending Attestation:   Physician Attestation Statement for Resident:  As the supervising MD   Physician Attestation Statement: I have  personally seen and examined this patient.   I agree with the above history.  -:   As the supervising MD I agree with the above PE.     As the supervising MD I agree with the above treatment, course, plan, and disposition.    I have reviewed and agree with the residents interpretation of the following: lab data and EKG.  I have reviewed the following: old records at this facility.                           Clinical Impression:   Final diagnoses:  [R09.02] Hypoxia  [F10.930] Alcohol withdrawal syndrome without complication (Primary)  [E87.29] Alcoholic ketoacidosis        ED Disposition Condition    Admit Stable                Abbey Soto MD  Resident  12/23/22 [id number]       Eun Kumar MD  12/23/22 [id number]

## 2022-12-23 NOTE — CONSULTS
Nutrition consult received regarding Diabetic diet education.  Pt's A1C from 12/23 is 4.6 - pt not appropriate for diabetic diet education.    Lani Veras MS, RD, LDN

## 2022-12-23 NOTE — ASSESSMENT & PLAN NOTE
62-year-old with history of alcohol abuse presents with alcohol intoxications and recurrent nausea vomiting.    Patient's nausea vomiting likely related to alcohol abuse possibilities of infectious gastroenteritis.    LFTs are mildly elevated, elevated WBC, lipase within normal range.  Given reported small amount of blood in her last episode of vomiting, concerns for mild GI bleeding due to recurrent nausea and vomiting.  Less clinically consistent with varcieal bleeding.   Given most recent ultrasound and clinical findings less concerned about cirrhosis.     EGD 4/2021; normal esophagus with gastritis   EGD 2/2020: small hiatal hernia. Gastritis.   C-scope 9/2020: unspecific colitis in ascending and sigmoid colon. Internal hemorrhoids.     -- RUQ US pending   -- continue alcohol withdrawal management  -- anti emetics PRNs  -- continue PPIs 40 mg b.i.d.  -- okay for clear liquids  -- continue monitoring, if recurrent hematemesis or melena and worsening anemia, will consider further evaluation with EGD. Ideally after patient is stable and no longer in alcohol withdrawal.

## 2022-12-23 NOTE — AI DETERIORATION ALERT
RAPID RESPONSE NURSE AI ALERT       AI alert received.    Chart Reviewed: 12/23/2022, 6:29 AM    MRN: [id number]  Bed: ED 20/20    Dx: <principal problem not specified>    Earl Abdul has a past medical history of Anemia, Anemia, Controlled type 2 diabetes mellitus without complication, without long-term current use of insulin, COPD (chronic obstructive pulmonary disease), Depression, Diverticulitis, Fatty liver, GERD (gastroesophageal reflux disease), Hyperlipidemia, Hypertension, Pancreatitis, Peptic ulcer disease, Polysubstance abuse, Posterior reversible encephalopathy syndrome, Sarcoidosis of lung, Sarcoidosis of lung, Seizures, Suicide attempt, and Suicide ideation.    Last VS: BP (!) 160/72   Pulse 110   Temp 98.1 °F (36.7 °C) (Oral)   Resp 18   SpO2 (!) 94%     24H Vital Sign Range:  Temp:  [98.1 °F (36.7 °C)]   Pulse:  [108-121]   Resp:  [18]   BP: (148-171)/(68-77)   SpO2:  [94 %-100 %]     Level of Consciousness (AVPU): alert    Recent Labs     12/23/22  0511   WBC 32.80*   HGB 12.0   HCT 39.4   *       Recent Labs     12/23/22  0511      K 3.6   CL 94*   CO2 20*   CREATININE 0.8   GLU 51*   PHOS 2.1*   MG 1.9        Recent Labs     12/23/22  0511   PH 7.270*   PCO2 54.6*   PO2 32*   HCO3 25.1   POCSATURATED 53*   BE -2        OXYGEN:             MEWS score:      Bedside RNNela  contacted. No concerns verbalized at this time. Instructed to call [phone] for further concerns or assistance.    Liborio Pierre, RN

## 2022-12-23 NOTE — H&P
[facility] - Telemetry [facility] Medicine  History & Physical    Patient Name: Earl Abdul  MRN: [id number]  Patient Class: IP- Inpatient  Admission Date: 12/23/2022  Attending Physician: Liborio Chamorro MD   Primary Care Provider: Andrew Rodriguez MD         Patient information was obtained from patient, spouse/SO and ER records.     Subjective:     Principal Problem:Alcoholic ketoacidosis    Chief Complaint:   Chief Complaint   Patient presents with    Drug / Alcohol Assessment     Pt states that she has been drinking heavily for the past two weeks. Pt states that she drinks a fifth a day. Pt presents to ED with c/o of nausea and vomiting. Pt last drink 2 hours ago. Pt states she is depressed but denies SI or HI.         HPI: Jason a 62-year-old female with a significant past medical history of alcohol use disorder with multiple hospitalizations and rehab attempts, CLL, breast cancer, T2DM, COPD, and suicide attempt presenting to the emergency department after roughly 4 days of persistent nausea and emesis.  She states she is been binge drinking for roughly 7 days, with about 1 bottle of vodka per day.  She also states due to her depression she is not been eating for a week as well.  Last drink was roughly 6-7 hours upon time of evaluation.  For her  she was recently in a rehab facility roughly 2 weeks ago.  She does have history of delirium tremens, and seizures due to alcohol withdrawals.  She denies any hallucinations, or seizure-like activity at this time.  She is however tremulous.  She denies any fevers chills shortness of breath or sweats. She further denies any abdominal tenderness. Endorses 1 time episode of bloody emesis which has not returned.  She states she stopped taking her regular medications roughly 5 days ago.    In the ED: Glucose was found to be in the 50s, She was given IV thiamine followed by D5 normal saline.  She was also given IV Ativan.        Past Medical History:    Diagnosis Date    Anemia     Anemia     Controlled type 2 diabetes mellitus without complication, without long-term current use of insulin 11/30/2021    COPD (chronic obstructive pulmonary disease)     Depression     Diverticulitis     Fatty liver     GERD (gastroesophageal reflux disease)     Hyperlipidemia     Hypertension     Pancreatitis     Peptic ulcer disease     Polysubstance abuse     Posterior reversible encephalopathy syndrome     Sarcoidosis of lung     Sarcoidosis of lung     over 30 yrs ago    Seizures     7/2017    Suicide attempt     Suicide ideation        Past Surgical History:   Procedure Laterality Date    APPENDECTOMY      BILATERAL MASTECTOMY Bilateral 10/29/2020    Procedure: MASTECTOMY, BILATERAL;  Surgeon: Baylee Kevin MD;  Location: [address];  Service: General;  Laterality: Bilateral;    BREAST REVISION SURGERY Bilateral 2/11/2021    Procedure: BREAST REVISION SURGERY;  Surgeon: Scottie Johnson MD;  Location: [facility] OR [address] [place] [address];  Service: Plastics;  Laterality: Bilateral;    COLONOSCOPY N/A 7/28/2017    Procedure: COLONOSCOPY;  Surgeon: Aaron Alvarado MD;  Location: [facility];  Service: Endoscopy;  Laterality: N/A;    ESOPHAGOGASTRODUODENOSCOPY  10/7/2016, 11/6/2014    2016 - gastritis, duodenitis, 2014 erosive gastritis    ESOPHAGOGASTRODUODENOSCOPY N/A 2/11/2020    Procedure: ESOPHAGOGASTRODUODENOSCOPY (EGD);  Surgeon: Fawn Garrido MD;  Location: [facility];  Service: Endoscopy;  Laterality: N/A;    ESOPHAGOGASTRODUODENOSCOPY N/A 4/19/2021    Procedure: EGD (ESOPHAGOGASTRODUODENOSCOPY);  Surgeon: Paramjit Martino MD;  Location: [facility];  Service: Endoscopy;  Laterality: N/A;    FLEXIBLE SIGMOIDOSCOPY  11/06/2014    colitis    HYSTERECTOMY      IMPLANTATION OF PERMANENT SACRAL NERVE STIMULATOR N/A 7/12/2022    Procedure: INSERTION, NEUROSTIMULATOR, PERMANENT, SACRAL;  Surgeon: Juaquin Edwards MD;  Location: [facility] OR [address] [place] [address];  Service: Urology;  Laterality: N/A;   1hr    INJECTION FOR SENTINEL NODE IDENTIFICATION Right 10/29/2020    Procedure: INJECTION, FOR SENTINEL NODE IDENTIFICATION;  Surgeon: Baylee Kevin MD;  Location: [address];  Service: General;  Laterality: Right;    INJECTION OF JOINT Right 10/10/2019    Procedure: Injection, Joint RIGHT ILIOPSOAS BURSA/TENDON INJECTION AND RIGHT GLUTEAL TENDON INJECTION WITH STEROID AND LIDOCAINE;  Surgeon: Guillaume Rico MD;  Location: [facility];  Service: Pain Management;  Laterality: Right;  NEEDS CONSENT    INSERTION OF BREAST TISSUE EXPANDER Bilateral 10/29/2020    Procedure: INSERTION, TISSUE EXPANDER, BREAST;  Surgeon: Scottie Johnson MD;  Location: [address];  Service: Plastics;  Laterality: Bilateral;  Right breast: 1082 g  Left breast: 1076 g    LIPOSUCTION Bilateral 2/11/2021    Procedure: LIPOSUCTION;  Surgeon: Scottie Johnson MD;  Location: [facility] OR [address] [place] [address];  Service: Plastics;  Laterality: Bilateral;    mediastenoscopy      REPLACEMENT OF IMPLANT OF BREAST Bilateral 2/11/2021    Procedure: REPLACEMENT, IMPLANT, BREAST;  Surgeon: Scottie Johnson MD;  Location: [address];  Service: Plastics;  Laterality: Bilateral;    SENTINEL LYMPH NODE BIOPSY Right 10/29/2020    Procedure: BIOPSY, LYMPH NODE, SENTINEL;  Surgeon: Baylee Kevin MD;  Location: [address];  Service: General;  Laterality: Right;    TONSILLECTOMY N/A 1970    TUBAL LIGATION         Review of patient's allergies indicates:   Allergen Reactions    Lortab [hydrocodone-acetaminophen] Itching    Promethazine Itching and Other (See Comments)       Current Facility-Administered Medications on File Prior to Encounter   Medication    albuterol sulfate nebulizer solution 2.5 mg     Current Outpatient Medications on File Prior to Encounter   Medication Sig    acetaminophen (TYLENOL) 500 MG tablet Take 1,000 mg by mouth every 4 (four) hours as needed for Pain.    ARIPiprazole (ABILIFY) 5 MG Tab Take 5 mg by mouth once  daily.    atorvastatin (LIPITOR) 10 MG tablet Take 10 mg by mouth every morning.    chlorzoxazone (PARAFON FORTE) 500 mg Tab Take 500 mg by mouth daily as needed (muscle spasms).    DULoxetine (CYMBALTA) 60 MG capsule Take 60 mg by mouth every evening.    folic acid (FOLVITE) 1 MG tablet Take 1 tablet (1 mg total) by mouth once daily.    gabapentin (NEURONTIN) 600 MG tablet Take 1 tablet (600 mg total) by mouth 2 (two) times daily.    levothyroxine (SYNTHROID) 50 MCG tablet Take 1 tablet (50 mcg total) by mouth before breakfast.    lisinopriL (PRINIVIL,ZESTRIL) 20 MG tablet Take 1 tablet (20 mg total) by mouth every morning. Hold medications until patient follows with PCP    metFORMIN (GLUCOPHAGE-XR) 500 MG ER 24hr tablet Take 2 tablets (1,000 mg total) by mouth 2 (two) times daily with meals.    multivitamin (THERAGRAN) per tablet Take 1 tablet by mouth once daily.    ondansetron (ZOFRAN ODT) 4 MG TbDL Take 1 tablet (4 mg total) by mouth every 6 (six) hours as needed (nausea).    ondansetron (ZOFRAN-ODT) 4 MG TbDL Take 2 tablets (8 mg total) by mouth every 12 (twelve) hours as needed (Nausea).    pantoprazole (PROTONIX) 40 MG tablet Take 1 tablet (40 mg total) by mouth once daily.    predniSONE (DELTASONE) 10 MG tablet 4 tabs/day for 4 days then 3 tabs/day for 4 days then 2tabs/day for 2 days    propranoloL (INDERAL) 10 MG tablet Take 1 tablet (10 mg total) by mouth 2 (two) times daily. Hold medications until patient follows with PCP    semaglutide (OZEMPIC) 0.25 mg or 0.5 mg(2 mg/1.5 mL) pen injector Inject 0.5 mg into the skin every 7 days. Start with 0.25 mg weekly for 4 weeks and then increase to 0.5 mg if you are tolerating this dose    thiamine 100 MG tablet Take 1 tablet (100 mg total) by mouth once daily.    traZODone (DESYREL) 300 MG tablet Take 1 tablet (300 mg total) by mouth every evening.    anastrozole (ARIMIDEX) 1 mg Tab Take 1 tablet (1 mg total) by mouth every morning.    ATROVENT A 17  mcg/actuation inhaler Inhale 2 puffs into the lungs every 6 (six) hours as needed for Wheezing.    famotidine (PEPCID) 20 MG tablet Take 1 tablet (20 mg total) by mouth 2 (two) times daily.    tiotropium-olodateroL (STIOLTO RESPIMAT) 2.5-2.5 mcg/actuation Mist Inhale 1 puff into the lungs 2 (two) times a day. Controller (Patient not taking: Reported on 10/13/2022)     Family History       Problem Relation (Age of Onset)    Breast cancer Maternal Aunt, Daughter    Colon cancer Maternal Uncle    Diabetes Father, Mother    Heart attack Father    Hypertension Father, Mother          Tobacco Use    Smoking status: Former     Packs/day: 0.50     Years: 30.00     Pack years: 15.00     Types: Vaping with nicotine, Cigarettes     Quit date: 2021     Years since quittin.8    Smokeless tobacco: Never   Substance and Sexual Activity    Alcohol use: Yes     Comment: vodka daily (half a regular bottle)    Drug use: Yes     Types: Marijuana     Comment: gummies    Sexual activity: Yes     Birth control/protection: Surgical     Review of Systems   Constitutional:  Positive for appetite change, chills and fatigue.   HENT:  Negative for congestion, nosebleeds, rhinorrhea, sneezing and sore throat.    Cardiovascular:  Positive for palpitations. Negative for chest pain and leg swelling.   Gastrointestinal:  Positive for nausea and vomiting. Negative for abdominal distention and abdominal pain.   Endocrine: Negative.    Genitourinary: Negative.    Musculoskeletal:  Negative for arthralgias, back pain, joint swelling and myalgias.   Skin: Negative.    Allergic/Immunologic: Negative.    Neurological:  Positive for tremors, weakness and headaches. Negative for seizures and facial asymmetry.   Psychiatric/Behavioral:  Negative for agitation, confusion, decreased concentration and dysphoric mood. The patient is not hyperactive.    Objective:     Vital Signs (Most Recent):  Temp: 98.2 °F (36.8 °C) (22 1100)  Pulse: (!) 118  (12/23/22 1100)  Resp: 20 (12/23/22 1100)  BP: (!) 147/74 (12/23/22 1100)  SpO2: 97 % (12/23/22 1100)   Vital Signs (24h Range):  Temp:  [98.1 °F (36.7 °C)-99 °F (37.2 °C)] 98.2 °F (36.8 °C)  Pulse:  [108-121] 118  Resp:  [18-25] 20  SpO2:  [91 %-100 %] 97 %  BP: (144-174)/(65-84) 147/74     Weight: 79.5 kg (175 lb 4.3 oz)  Body mass index is 28.29 kg/m².    Physical Exam  Constitutional:       General: She is in acute distress.      Appearance: Normal appearance. She is ill-appearing. She is not toxic-appearing or diaphoretic.   HENT:      Head: Normocephalic and atraumatic.      Nose: Nose normal.      Mouth/Throat:      Mouth: Mucous membranes are moist.      Pharynx: Oropharynx is clear.   Cardiovascular:      Rate and Rhythm: Regular rhythm. Tachycardia present.   Pulmonary:      Effort: No respiratory distress.      Breath sounds: No wheezing.   Abdominal:      General: There is no distension.      Tenderness: There is no abdominal tenderness. There is no guarding.   Musculoskeletal:         General: No swelling or tenderness.      Right lower leg: No edema.      Left lower leg: No edema.   Skin:     Coloration: Skin is not jaundiced.      Findings: Bruising (Abdomen, nontender) present.   Neurological:      General: No focal deficit present.      Mental Status: She is alert and oriented to person, place, and time.           Significant Labs: All pertinent labs within the past 24 hours have been reviewed.    Significant Imaging: I have reviewed all pertinent imaging results/findings within the past 24 hours.    Assessment/Plan:     * Alcoholic ketoacidosis  62 year old with singiifcant PMHx of alocohl abuse, with multiple hospitalizations and rehab stays presents with nausea, bloody vomiting, tremors, and malaise for last several days. She also endorses not eating for at least a week. Glucose 50s upon admission, tremors noted. Patient awake, alert, and oriented. Given IV thiamine and started on D5NS in ED.   -  Liver U/S ordered   - Will continue D5NS for now   - Standing valium and prns for withdrawals along with q4 CIWA checks   - DKA protocol for now given acidosis in setting of T2DM diagnosis and alcohol abuse        Hypothyroidism  Cont home synthroid       Malignant neoplasm of central portion of right breast in female, estrogen receptor positive  Med rec notable for aripiprazole taken once daily. Further hx of CLL/CMBL, WBC currently 32 with no indication of infectious process currently   - Will consult heme/onc, appreciate recs        Hyperlipidemia  Holding statin in setting of elevated LFTs       Bloody emesis  Patient endorsed 1x episode of nausea and blooding vomiting. Noted dried red stains on blanket and patient clothes. States has never happened before and she has no hx that would allude to this. Hgb upon admission stable at 12   - Daily CBCs   - Holding DVT prophylaxis for now in setting of potential bleed   - EGD from April 2021 was noncontributory  - prontonix 80mg IV followed by 40mg q12   - NPO except for medication administration   - GI consulted, appreciate recs       Alcohol use disorder, severe, dependence  Patient with significant hx of alcohol abuse requiring multiple hospitalizations as well as several rehab admissions per  presetns after 1x week of no eating and binge drinking a 5th of vodka per day. Patient is AAOx3 but tremulous. Last drink was 6-8 hours prior.      Plan    - CIWA driven as below   - Thiamine IV given in  - Monitor daily CMP and closely monitor electrolytes  - Seizure precautions and CIWA q4 ordered    - PO Valium standing 10mg TID for now, increased to q6 per psych recs    - PRN ativan for breakthrough symptoms tremors noted thus far            Ciwa driven     CIWA,   Frequency          Medication    D            ose    < 8      Q4H x 6 - If CIWA-AR score < 8, stop None  8?14    Q2H           Lorazepam  1 mg  15?20  Q1H           Lorazepam 2 mg  21?30  Q1H            Lorazepam 3 mg  31?45  Q1H           Lorazepam4 mg  [id number] Minutes          Lorazepam 2 mg        Anxiety and Depression  Patient takes Abilify and Cymbalta  - Will hold pending eval from psychiatry/addiction         VTE Risk Mitigation (From admission, onward)           Ordered     IP VTE HIGH RISK PATIENT  Once         12/23/22 0651     Place sequential compression device  Until discontinued         12/23/22 0651                       Sussex West, DO  Department of Hospital Medicine   [facility] - Telemetry Stepdown

## 2022-12-23 NOTE — HPI
62 years old with alcohol use disorder, CLL/MBCL, sarcoidosis, PRES, pancreatitis, HTN and suicide attempt presenting with 4 days of nausea and vomiting. GI consulted for concerns of hematemesis.   She states heavy drinking over the past week the associate NBNB emesis and nausea with one episode of dark emesis concerning for blood.       She has been drinking 1 bottle of vodka a day with last drink 4 hours prior to ED arrival.  She was able to attend rehab according to  but started drinking again two weeks ago. Noted a history of delirium tremens including hallucinations and 1 prior seizure during alcohol withdrawal.  Patient is denying any current auditory or visual hallucinations , but does endorse a generalized tremor and anxiety.  She also reports a generalized headache and says that she has headaches most days when she is drinking.   Patient denies chest pain, shortness of breath, abdominal pain, dysuria, polyuria, fever, chills, diarrhea, hematuria, blood in her stool, hematemesis, rashes, easy bleeding, abdominal distension.    GI procedures:     EGD 4/2021; normal esophagus with gastritis   EGD 2/2020: small hiatal hernia. Gastritis.   C-scope 9/2020: unspecific colitis in ascending and sigmoid colon. Internal hemorrhoids.

## 2022-12-23 NOTE — SUBJECTIVE & OBJECTIVE
Past Medical History:   Diagnosis Date    Anemia     Anemia     Controlled type 2 diabetes mellitus without complication, without long-term current use of insulin 11/30/2021    COPD (chronic obstructive pulmonary disease)     Depression     Diverticulitis     Fatty liver     GERD (gastroesophageal reflux disease)     Hyperlipidemia     Hypertension     Pancreatitis     Peptic ulcer disease     Polysubstance abuse     Posterior reversible encephalopathy syndrome     Sarcoidosis of lung     Sarcoidosis of lung     over 30 yrs ago    Seizures     7/2017    Suicide attempt     Suicide ideation        Past Surgical History:   Procedure Laterality Date    APPENDECTOMY      BILATERAL MASTECTOMY Bilateral 10/29/2020    Procedure: MASTECTOMY, BILATERAL;  Surgeon: Baylee Kevin MD;  Location: [address];  Service: General;  Laterality: Bilateral;    BREAST REVISION SURGERY Bilateral 2/11/2021    Procedure: BREAST REVISION SURGERY;  Surgeon: Scottie Johnson MD;  Location: [facility] OR [address] [place] [address];  Service: Plastics;  Laterality: Bilateral;    COLONOSCOPY N/A 7/28/2017    Procedure: COLONOSCOPY;  Surgeon: Aaron Alvarado MD;  Location: [facility];  Service: Endoscopy;  Laterality: N/A;    ESOPHAGOGASTRODUODENOSCOPY  10/7/2016, 11/6/2014    2016 - gastritis, duodenitis, 2014 erosive gastritis    ESOPHAGOGASTRODUODENOSCOPY N/A 2/11/2020    Procedure: ESOPHAGOGASTRODUODENOSCOPY (EGD);  Surgeon: Fawn Garrido MD;  Location: [facility];  Service: Endoscopy;  Laterality: N/A;    ESOPHAGOGASTRODUODENOSCOPY N/A 4/19/2021    Procedure: EGD (ESOPHAGOGASTRODUODENOSCOPY);  Surgeon: Paramjit Martino MD;  Location: [facility];  Service: Endoscopy;  Laterality: N/A;    FLEXIBLE SIGMOIDOSCOPY  11/06/2014    colitis    HYSTERECTOMY      IMPLANTATION OF PERMANENT SACRAL NERVE STIMULATOR N/A 7/12/2022    Procedure: INSERTION, NEUROSTIMULATOR, PERMANENT, SACRAL;  Surgeon: Juaquin Edwards MD;  Location: [facility] OR [address] [place] [address];  Service:  Urology;  Laterality: N/A;  1hr    INJECTION FOR SENTINEL NODE IDENTIFICATION Right 10/29/2020    Procedure: INJECTION, FOR SENTINEL NODE IDENTIFICATION;  Surgeon: Baylee Kevin MD;  [facility];  Service: General;  Laterality: Right;    INJECTION OF JOINT Right 10/10/2019    Procedure: Injection, Joint RIGHT ILIOPSOAS BURSA/TENDON INJECTION AND RIGHT GLUTEAL TENDON INJECTION WITH STEROID AND LIDOCAINE;  Surgeon: Guillaume Rico MD;  Location: [facility];  Service: Pain Management;  Laterality: Right;  NEEDS CONSENT    INSERTION OF BREAST TISSUE EXPANDER Bilateral 10/29/2020    Procedure: INSERTION, TISSUE EXPANDER, BREAST;  Surgeon: Scottie Johnson MD;  Location: [facility] OR [address] [place] [address];  Service: Plastics;  Laterality: Bilateral;  Right breast: 1082 g  Left breast: 1076 g    LIPOSUCTION Bilateral 2/11/2021    Procedure: LIPOSUCTION;  Surgeon: Scottie Johnson MD;  Location: [facility] OR [address] [place] [address];  Service: Plastics;  Laterality: Bilateral;    mediastenoscopy      REPLACEMENT OF IMPLANT OF BREAST Bilateral 2/11/2021    Procedure: REPLACEMENT, IMPLANT, BREAST;  Surgeon: Scottie Johnson MD;  Location: [address];  Service: Plastics;  Laterality: Bilateral;    SENTINEL LYMPH NODE BIOPSY Right 10/29/2020    Procedure: BIOPSY, LYMPH NODE, SENTINEL;  Surgeon: Baylee Kevin MD;  Location: [address];  Service: General;  Laterality: Right;    TONSILLECTOMY N/A 1970    TUBAL LIGATION         Review of patient's allergies indicates:   Allergen Reactions    Lortab [hydrocodone-acetaminophen] Itching    Promethazine Itching and Other (See Comments)     Family History       Problem Relation (Age of Onset)    Breast cancer Maternal Aunt, Daughter    Colon cancer Maternal Uncle    Diabetes Father, Mother    Heart attack Father    Hypertension Father, Mother          Tobacco Use    Smoking status: Former     Packs/day: 0.50     Years: 30.00     Pack years: 15.00     Types: Vaping with nicotine,  Cigarettes     Quit date: 2021     Years since quittin.8    Smokeless tobacco: Never   Substance and Sexual Activity    Alcohol use: Yes     Comment: vodka daily (half a regular bottle)    Drug use: Yes     Types: Marijuana     Comment: gummies    Sexual activity: Yes     Birth control/protection: Surgical     Review of Systems   Constitutional:  Negative for chills and fever.   HENT:  Negative for congestion and facial swelling.    Eyes:  Negative for visual disturbance.   Respiratory:  Negative for cough and shortness of breath.    Cardiovascular:  Negative for chest pain and leg swelling.   Gastrointestinal:  Positive for diarrhea, nausea and vomiting.   Genitourinary:  Negative for difficulty urinating and dysuria.   Musculoskeletal:  Negative for arthralgias.   Skin:  Negative for rash.   Neurological:  Positive for headaches.   Psychiatric/Behavioral:  Negative for agitation.    Objective:     Vital Signs (Most Recent):  Temp: 98.3 °F (36.8 °C) (22 0632)  Pulse: 109 (22 0839)  Resp: 20 (22 0723)  BP: (!) 149/67 (22 0832)  SpO2: 95 % (22 07)   Vital Signs (24h Range):  Temp:  [98.1 °F (36.7 °C)-98.3 °F (36.8 °C)] 98.3 °F (36.8 °C)  Pulse:  [108-121] 109  Resp:  [18-25] 20  SpO2:  [91 %-100 %] 95 %  BP: (148-174)/(65-84) 149/67        There is no height or weight on file to calculate BMI.      Intake/Output Summary (Last 24 hours) at 2022 1004  Last data filed at 2022 0607  Gross per 24 hour   Intake 999 ml   Output --   Net 999 ml       Lines/Drains/Airways       Peripheral Intravenous Line  Duration                  Peripheral IV - Single Lumen 22 0439 20 G Posterior;Right Forearm <1 day                    Physical Exam  Constitutional:       General: She is not in acute distress.  HENT:      Head: Normocephalic and atraumatic.      Mouth/Throat:      Mouth: Mucous membranes are moist.      Pharynx: Oropharynx is clear.   Eyes:      General: No scleral  icterus.     Extraocular Movements: Extraocular movements intact.      Conjunctiva/sclera: Conjunctivae normal.   Cardiovascular:      Rate and Rhythm: Normal rate and regular rhythm.      Heart sounds: Normal heart sounds.   Pulmonary:      Effort: Pulmonary effort is normal.      Breath sounds: Normal breath sounds. No wheezing or rales.      Comments: 2 L NC    Abdominal:      General: Abdomen is flat. Bowel sounds are normal.      Palpations: Abdomen is soft.   Musculoskeletal:         General: Normal range of motion.      Cervical back: Normal range of motion.      Right lower leg: No edema.      Left lower leg: No edema.   Skin:     General: Skin is warm and dry.   Neurological:      General: No focal deficit present.      Mental Status: She is alert.      Comments: tremulous   Psychiatric:         Mood and Affect: Mood normal.       Significant Labs:  Acute Hepatitis Panel: No results for input(s): HEPBSAG, HEPAIGM, HEPCAB in the last 48 hours.    Invalid input(s): HAPBIGM  Amylase: No results for input(s): AMYLASE in the last 48 hours.  Blood Culture: No results for input(s): LABBLOO in the last 48 hours.  CBC:   Recent Labs   Lab 12/23/22  0511   WBC 32.80*   HGB 12.0   HCT 39.4   *     BMP:   Recent Labs   Lab 12/23/22 0511   GLU 51*      K 3.6   CL 94*   CO2 20*   BUN 12   CREATININE 0.8   CALCIUM 9.0   MG 1.9     CMP:   Recent Labs   Lab 12/23/22  0511   GLU 51*   CALCIUM 9.0   ALBUMIN 4.8   PROT 8.0      K 3.6   CO2 20*   CL 94*   BUN 12   CREATININE 0.8   ALKPHOS 73   ALT 55*   *   BILITOT 1.0     Coagulation:   Recent Labs   Lab 12/23/22  0724   INR 1.0     CRP: No results for input(s): CRP in the last 48 hours.  ESR: No results for input(s): SEDRATE in the last 48 hours.  H.Pylori Ab IgG: No results for input(s): HPYLORIIGG in the last 48 hours.  Lipase:   Recent Labs   Lab 12/23/22  0511   LIPASE 35     Liver Function Test:   Recent Labs   Lab 12/23/22  0511   ALT 55*    *   ALKPHOS 73   BILITOT 1.0   PROT 8.0   ALBUMIN 4.8     Peritoneal Fluid Cultures: No results for input(s): AEROBICCULTU, LABGRAM in the last 48 hours.  Stool C. diff: No results for input(s): CDIFFICILEAN, CDIFFTOX in the last 48 hours.  Stool Culture: No results for input(s): STOOLCULTURE in the last 48 hours.  Stool Ova/Cysts/Parasites: No results for input(s): STLEXAMOCP in the last 48 hours.  Stool Giardia/Crypto: No results for input(s): GIARDIAANTIG, CRSPAG in the last 48 hours.  Stool WBCs: No results for input(s): STOOLWBC in the last 48 hours.  Stool Lactoferrin: No results for input(s): LACTOFERRINS in the last 48 hours.  All pertinent lab results from the last 24 hours have been reviewed.    Significant Imaging:  Imaging results within the past 24 hours have been reviewed.

## 2022-12-23 NOTE — PLAN OF CARE
Oncology Plan of Care    Patient is a 63 y/o female with hx of early stage breast cancer on hormonal therapy with anastrozole. Oncology consulted regarding medication continuation. Can resume medication on discharge.     BMT to address question regarding MBCL/CLL.    Oncology to sign off. Please call for qs.     Stephanie Radford MD  Hematology/Oncology Fellow PGY V  [facility]

## 2022-12-23 NOTE — ASSESSMENT & PLAN NOTE
Patient endorsed 1x lepidote of nausea and blooding vomiting. Noted dried red stains on blanket and patient clothes. States has never happened before and she has no hx that would allude to this. Hgb upon admission stable at 12   - Daily CBCs   - EGD from April 2021 was noncontributory  - prontonix 80mg IV followed by 40mg q12   - NPO except for medication administration   - GI consulted, appreciate recs

## 2022-12-23 NOTE — HPI
Jason a 62-year-old female with a significant past medical history of alcohol use disorder with multiple hospitalizations and rehab attempts, CLL, breast cancer, T2DM, COPD, and suicide attempt presenting to the emergency department after roughly 4 days of persistent nausea and emesis.  She states she is been binge drinking for roughly 7 days, with about 1 bottle of vodka per day.  She also states due to her depression she is not been eating for a week as well.  Last drink was roughly 6-7 hours upon time of evaluation.  For her  she was recently in a rehab facility roughly 2 weeks ago.  She does have history of delirium tremens, and seizures due to alcohol withdrawals.  She denies any hallucinations, or seizure-like activity at this time.  She is however tremulous.  She denies any fevers, chills ,shortness of breath, or sweats. She further denies any abdominal tenderness. Endorses 1 time episode of bloody emesis which has not returned.  She states she stopped taking her regular medications roughly 5 days ago.    In the ED: Glucose was found to be in the 50s, She was given IV thiamine followed by D5 normal saline.  She was also given IV Ativan.

## 2022-12-23 NOTE — ASSESSMENT & PLAN NOTE
62 year old with singiifcant PMHx of alocohl abuse, with multiple hospitalizations and rehab stays presents with nausea, bloody vomiting, tremors, and malaise for last several days. She also endorses not eating for at least a week. Glucose 50s upon admission, tremors noted. Patient awake, alert, and oriented. Given IV thiamine and started on D5NS in ED.   - Liver U/S ordered   - Will continue D5NS for now   - Standing valium and prns for withdrawals along with q4 CIWA checks   - DKA protocol for now given acidosis in setting of T2DM diagnosis and alcohol abuse

## 2022-12-24 LAB
ANION GAP SERPL CALC-SCNC: 10 MMOL/L (ref 8–16)
ANION GAP SERPL CALC-SCNC: 10 MMOL/L (ref 8–16)
ANION GAP SERPL CALC-SCNC: 13 MMOL/L (ref 8–16)
ANION GAP SERPL CALC-SCNC: 14 MMOL/L (ref 8–16)
BUN SERPL-MCNC: 3 MG/DL (ref 8–23)
BUN SERPL-MCNC: 3 MG/DL (ref 8–23)
BUN SERPL-MCNC: 4 MG/DL (ref 8–23)
BUN SERPL-MCNC: 7 MG/DL (ref 8–23)
CALCIUM SERPL-MCNC: 7.9 MG/DL (ref 8.7–10.5)
CALCIUM SERPL-MCNC: 7.9 MG/DL (ref 8.7–10.5)
CALCIUM SERPL-MCNC: 8.6 MG/DL (ref 8.7–10.5)
CALCIUM SERPL-MCNC: 8.6 MG/DL (ref 8.7–10.5)
CHLORIDE SERPL-SCNC: 104 MMOL/L (ref 95–110)
CHLORIDE SERPL-SCNC: 105 MMOL/L (ref 95–110)
CHLORIDE SERPL-SCNC: 105 MMOL/L (ref 95–110)
CHLORIDE SERPL-SCNC: 99 MMOL/L (ref 95–110)
CO2 SERPL-SCNC: 21 MMOL/L (ref 23–29)
CO2 SERPL-SCNC: 23 MMOL/L (ref 23–29)
CO2 SERPL-SCNC: 25 MMOL/L (ref 23–29)
CO2 SERPL-SCNC: 26 MMOL/L (ref 23–29)
CREAT SERPL-MCNC: 0.7 MG/DL (ref 0.5–1.4)
CREAT SERPL-MCNC: 0.8 MG/DL (ref 0.5–1.4)
CREAT SERPL-MCNC: 0.8 MG/DL (ref 0.5–1.4)
CREAT SERPL-MCNC: 1 MG/DL (ref 0.5–1.4)
EST. GFR  (NO RACE VARIABLE): >60 ML/MIN/1.73 M^2
GLUCOSE SERPL-MCNC: 127 MG/DL (ref 70–110)
GLUCOSE SERPL-MCNC: 135 MG/DL (ref 70–110)
GLUCOSE SERPL-MCNC: 146 MG/DL (ref 70–110)
GLUCOSE SERPL-MCNC: 165 MG/DL (ref 70–110)
POCT GLUCOSE: 129 MG/DL (ref 70–110)
POCT GLUCOSE: 156 MG/DL (ref 70–110)
POCT GLUCOSE: 163 MG/DL (ref 70–110)
POCT GLUCOSE: 170 MG/DL (ref 70–110)
POCT GLUCOSE: 176 MG/DL (ref 70–110)
POCT GLUCOSE: 183 MG/DL (ref 70–110)
POCT GLUCOSE: 184 MG/DL (ref 70–110)
POTASSIUM SERPL-SCNC: 3.3 MMOL/L (ref 3.5–5.1)
POTASSIUM SERPL-SCNC: 3.6 MMOL/L (ref 3.5–5.1)
POTASSIUM SERPL-SCNC: 3.8 MMOL/L (ref 3.5–5.1)
POTASSIUM SERPL-SCNC: 3.8 MMOL/L (ref 3.5–5.1)
SODIUM SERPL-SCNC: 136 MMOL/L (ref 136–145)
SODIUM SERPL-SCNC: 138 MMOL/L (ref 136–145)
SODIUM SERPL-SCNC: 140 MMOL/L (ref 136–145)
SODIUM SERPL-SCNC: 141 MMOL/L (ref 136–145)
TSH SERPL DL<=0.005 MIU/L-ACNC: 0.82 UIU/ML (ref 0.4–4)

## 2022-12-24 PROCEDURE — 94761 N-INVAS EAR/PLS OXIMETRY MLT: CPT

## 2022-12-24 PROCEDURE — 63600175 PHARM REV CODE 636 W HCPCS

## 2022-12-24 PROCEDURE — 63600175 PHARM REV CODE 636 W HCPCS: Performed by: INTERNAL MEDICINE

## 2022-12-24 PROCEDURE — 36415 COLL VENOUS BLD VENIPUNCTURE: CPT

## 2022-12-24 PROCEDURE — 93010 EKG 12-LEAD: ICD-10-PCS | Mod: ,,, | Performed by: INTERNAL MEDICINE

## 2022-12-24 PROCEDURE — 63600175 PHARM REV CODE 636 W HCPCS: Performed by: STUDENT IN AN ORGANIZED HEALTH CARE EDUCATION/TRAINING PROGRAM

## 2022-12-24 PROCEDURE — 80048 BASIC METABOLIC PNL TOTAL CA: CPT

## 2022-12-24 PROCEDURE — 27000221 HC OXYGEN, UP TO 24 HOURS

## 2022-12-24 PROCEDURE — 93010 ELECTROCARDIOGRAM REPORT: CPT | Mod: ,,, | Performed by: INTERNAL MEDICINE

## 2022-12-24 PROCEDURE — 25000003 PHARM REV CODE 250: Performed by: INTERNAL MEDICINE

## 2022-12-24 PROCEDURE — 93005 ELECTROCARDIOGRAM TRACING: CPT

## 2022-12-24 PROCEDURE — S0166 INJ OLANZAPINE 2.5MG: HCPCS | Performed by: STUDENT IN AN ORGANIZED HEALTH CARE EDUCATION/TRAINING PROGRAM

## 2022-12-24 PROCEDURE — 99232 PR SUBSEQUENT HOSPITAL CARE,LEVL II: ICD-10-PCS | Mod: ,,, | Performed by: HOSPITALIST

## 2022-12-24 PROCEDURE — 84443 ASSAY THYROID STIM HORMONE: CPT

## 2022-12-24 PROCEDURE — 25000003 PHARM REV CODE 250

## 2022-12-24 PROCEDURE — 25000003 PHARM REV CODE 250: Performed by: STUDENT IN AN ORGANIZED HEALTH CARE EDUCATION/TRAINING PROGRAM

## 2022-12-24 PROCEDURE — 99232 SBSQ HOSP IP/OBS MODERATE 35: CPT | Mod: ,,, | Performed by: HOSPITALIST

## 2022-12-24 PROCEDURE — 20600001 HC STEP DOWN PRIVATE ROOM

## 2022-12-24 PROCEDURE — C9113 INJ PANTOPRAZOLE SODIUM, VIA: HCPCS

## 2022-12-24 RX ORDER — HALOPERIDOL 5 MG/ML
5 INJECTION INTRAMUSCULAR ONCE AS NEEDED
Status: COMPLETED | OUTPATIENT
Start: 2022-12-24 | End: 2022-12-24

## 2022-12-24 RX ORDER — POTASSIUM CHLORIDE 20 MEQ/1
40 TABLET, EXTENDED RELEASE ORAL
Status: COMPLETED | OUTPATIENT
Start: 2022-12-24 | End: 2022-12-24

## 2022-12-24 RX ORDER — LORAZEPAM 2 MG/ML
2 INJECTION INTRAMUSCULAR EVERY 6 HOURS PRN
Status: DISCONTINUED | OUTPATIENT
Start: 2022-12-24 | End: 2022-12-25

## 2022-12-24 RX ORDER — OLANZAPINE 10 MG/2ML
5 INJECTION, POWDER, FOR SOLUTION INTRAMUSCULAR ONCE AS NEEDED
Status: COMPLETED | OUTPATIENT
Start: 2022-12-24 | End: 2022-12-24

## 2022-12-24 RX ORDER — HALOPERIDOL 5 MG/ML
5 INJECTION INTRAMUSCULAR ONCE
Status: COMPLETED | OUTPATIENT
Start: 2022-12-24 | End: 2022-12-24

## 2022-12-24 RX ORDER — HALOPERIDOL 5 MG/ML
5 INJECTION INTRAMUSCULAR ONCE AS NEEDED
Status: DISCONTINUED | OUTPATIENT
Start: 2022-12-24 | End: 2022-12-24

## 2022-12-24 RX ORDER — PANTOPRAZOLE SODIUM 40 MG/1
40 TABLET, DELAYED RELEASE ORAL
Status: DISCONTINUED | OUTPATIENT
Start: 2022-12-24 | End: 2022-12-28 | Stop reason: HOSPADM

## 2022-12-24 RX ORDER — HALOPERIDOL 5 MG/ML
5 INJECTION INTRAMUSCULAR ONCE AS NEEDED
Status: COMPLETED | OUTPATIENT
Start: 2022-12-24 | End: 2022-12-25

## 2022-12-24 RX ORDER — LORAZEPAM 2 MG/ML
2 INJECTION INTRAMUSCULAR EVERY 6 HOURS PRN
Status: DISCONTINUED | OUTPATIENT
Start: 2022-12-24 | End: 2022-12-28 | Stop reason: HOSPADM

## 2022-12-24 RX ADMIN — LEVOTHYROXINE SODIUM 50 MCG: 50 TABLET ORAL at 06:12

## 2022-12-24 RX ADMIN — PANTOPRAZOLE SODIUM 40 MG: 40 TABLET, DELAYED RELEASE ORAL at 05:12

## 2022-12-24 RX ADMIN — DIAZEPAM 10 MG: 5 TABLET ORAL at 11:12

## 2022-12-24 RX ADMIN — LORAZEPAM 2 MG: 2 INJECTION INTRAMUSCULAR; INTRAVENOUS at 02:12

## 2022-12-24 RX ADMIN — HALOPERIDOL LACTATE 5 MG: 5 INJECTION, SOLUTION INTRAMUSCULAR at 06:12

## 2022-12-24 RX ADMIN — LORAZEPAM 2 MG: 2 INJECTION INTRAMUSCULAR; INTRAVENOUS at 11:12

## 2022-12-24 RX ADMIN — POTASSIUM CHLORIDE 40 MEQ: 1500 TABLET, EXTENDED RELEASE ORAL at 11:12

## 2022-12-24 RX ADMIN — HALOPERIDOL LACTATE 5 MG: 5 INJECTION, SOLUTION INTRAMUSCULAR at 05:12

## 2022-12-24 RX ADMIN — OLANZAPINE 5 MG: 10 INJECTION, POWDER, FOR SOLUTION INTRAMUSCULAR at 05:12

## 2022-12-24 RX ADMIN — HALOPERIDOL LACTATE 5 MG: 5 INJECTION, SOLUTION INTRAMUSCULAR at 08:12

## 2022-12-24 RX ADMIN — POTASSIUM CHLORIDE 40 MEQ: 1500 TABLET, EXTENDED RELEASE ORAL at 08:12

## 2022-12-24 RX ADMIN — DIAZEPAM 10 MG: 5 TABLET ORAL at 05:12

## 2022-12-24 RX ADMIN — ONDANSETRON 4 MG: 2 INJECTION INTRAMUSCULAR; INTRAVENOUS at 12:12

## 2022-12-24 RX ADMIN — LORAZEPAM 2 MG: 2 INJECTION INTRAMUSCULAR; INTRAVENOUS at 08:12

## 2022-12-24 RX ADMIN — LORAZEPAM 2 MG: 2 INJECTION INTRAMUSCULAR; INTRAVENOUS at 05:12

## 2022-12-24 RX ADMIN — PANTOPRAZOLE SODIUM 40 MG: 40 INJECTION, POWDER, FOR SOLUTION INTRAVENOUS at 08:12

## 2022-12-24 RX ADMIN — DIAZEPAM 10 MG: 5 TABLET ORAL at 06:12

## 2022-12-24 RX ADMIN — FOLIC ACID 1 MG: 1 TABLET ORAL at 08:12

## 2022-12-24 RX ADMIN — THERA TABS 1 TABLET: TAB at 08:12

## 2022-12-24 RX ADMIN — THIAMINE HCL TAB 100 MG 100 MG: 100 TAB at 08:12

## 2022-12-24 NOTE — CARE UPDATE
RAPID RESPONSE NURSE ROUND       Rounding completed with charge RNTrang for restlessness/agitation reports primary team contacted, awaiting new orders. No additional concerns verbalized at this time. Instructed to call [phone] for further concerns or assistance.

## 2022-12-24 NOTE — NURSING
Pt is  agitated and pull her IV earlier demanding to go home. Pt was de-escalated again but now she turned off the bed alarm, disconnected the IVF from the IV line and was roaming the hallway.  She already took off her tele box.  She is demanding to be discharged. Notified team and charge, Jami.

## 2022-12-24 NOTE — ASSESSMENT & PLAN NOTE
Patient with significant hx of alcohol abuse requiring multiple hospitalizations as well as several rehab admissions per  presetns after 1x week of no eating and binge drinking a 5th of vodka per day. Patient is AAOx3 but tremulous. Last drink was 6-8 hours prior.      Plan    - CIWA driven as below   - Thiamine IV given in  - Monitor daily CMP and closely monitor electrolytes  - Seizure precautions and CIWA q4 ordered    - PO Valium standing 10mg TID for now, increased to q6 per psych recs    - PRN ativan for breakthrough symptoms tremors noted thus far - needed overnight   - Haldol given overnigt due to substantially increased agitation, psych recommends oral or IM administration if needed  - Appreciate further  psych recs             Ciwa driven     CIWA,   Frequency          Medication    D            ose    < 8      Q4H x 6 - If CIWA-AR score < 8, stop None  8?14    Q2H           Lorazepam  1 mg  15?20  Q1H           Lorazepam 2 mg  21?30  Q1H           Lorazepam 3 mg  31?45  Q1H           Lorazepam4 mg  [id number] Minutes          Lorazepam 2 mg

## 2022-12-24 NOTE — ASSESSMENT & PLAN NOTE
62 year old with singiifcant PMHx of alocohl abuse, with multiple hospitalizations and rehab stays presents with nausea, bloody vomiting, tremors, and malaise for last several days. She also endorses not eating for at least a week. Glucose 50s upon admission, tremors noted. Patient awake, alert, and oriented. Given IV thiamine and started on D5NS in ED.   - Liver U/S ordered   - Will continue D5NS for now   - Standing valium and prns for withdrawals along with q4 CIWA checks   - POCT glucose switched to 4x daily, sugars runs >140s <190s last 10x checks

## 2022-12-24 NOTE — HOSPITAL COURSE
Admitted for alcohol withdrawals with prior hx of seizures as a result. Started on 10mg valium q8 increased to q6 per psychiatry recommendations. PRN ativan as needed for further withdrawals CIWA >8. Patient became very agitated during admission, threatening to rip out IV lines and walk out the hospital. Patient was given IV haldol by team which helped with the agitation.  at bedside along with primary team. Patient expressed desire to go home to see grandchildren however is very noticeably tremulous during this meeting.  expresses safety concerns as well. Psych aware, and will give further recs. In evening, patient found to be increasingly agitated, pulling IV lines, disabling her bed alarm, getting out of bed and threatening to leave AMA. Given PRN ativan and Zyprexa. She had inconsistent train of thought, slurring her speech. She was further unable to demonstrate insight into her medical condition as well as what may happen if she were to leave. Pt was PEC'ed and benzodiazepine taper was continued and pt became more agreeable to care throughout hospitalization. Pt and  found inpatient rehab facility in [place] and will be discharged to finish taper as outpatient.  agreeable to monitoring patient and assisting with benzo taper prior to admission to rehab facility.

## 2022-12-24 NOTE — CARE UPDATE
"RAPID RESPONSE NURSE CHART REVIEW        Chart Reviewed: 12/24/2022, 5:13 PM    MRN: [id number]  Bed: 8082/8082 A    Dx: Alcoholic ketoacidosis    Earl Abdul has a past medical history of Anemia, Anemia, Controlled type 2 diabetes mellitus without complication, without long-term current use of insulin, COPD (chronic obstructive pulmonary disease), Depression, Diverticulitis, Fatty liver, GERD (gastroesophageal reflux disease), Hyperlipidemia, Hypertension, Pancreatitis, Peptic ulcer disease, Polysubstance abuse, Posterior reversible encephalopathy syndrome, Sarcoidosis of lung, Sarcoidosis of lung, Seizures, Suicide attempt, and Suicide ideation.    Last VS: BP (!) 175/81 (BP Location: Right arm, Patient Position: Lying)   Pulse (!) 112   Temp 98.7 °F (37.1 °C) (Oral)   Resp 18   Ht 5' 6" (1.676 m)   Wt 79.5 kg (175 lb 4.3 oz)   SpO2 (!) 91%   BMI 28.29 kg/m²     24H Vital Sign Range:  Temp:  [98.3 °F (36.8 °C)-98.9 °F (37.2 °C)]   Pulse:  []   Resp:  [16-20]   BP: (140-195)/(65-86)   SpO2:  [91 %-95 %]     Level of Consciousness (AVPU): responds to voice    Recent Labs     12/23/22  0511   WBC 32.80*   HGB 12.0   HCT 39.4   *       Recent Labs     12/23/22  0511 12/23/22  1328 12/23/22  1947 12/23/22  2310 12/24/22  1121      < > 138 136 141   K 3.6   < > 3.5 3.3* 3.6   CL 94*   < > 100 99 105   CO2 20*   < > 23 23 26   CREATININE 0.8   < > 1.0 1.0 0.8   GLU 51*   < > 126* 135* 165*   PHOS 2.1*  --   --   --   --    MG 1.9  --   --   --   --     < > = values in this interval not displayed.        Recent Labs     12/23/22  0511   PH 7.270*   PCO2 54.6*   PO2 32*   HCO3 25.1   POCSATURATED 53*   BE -2        OXYGEN:  Flow (L/min): 2          MEWS score: 2    Dr. Villanueva   contacted for HTN. Dr. Villanueva stated he would add PRN medications for SYS greater than 160. No additional concerns verbalized at this time. Instructed to call [phone] for further concerns or assistance.    Andrew Gatica, " RN

## 2022-12-24 NOTE — NURSING
Pt very anxious and agitated, states she's calling an [employer] and going home. Notified on-call, MD came up to talk to pt and ordered one time dose of Trazodone per pt request and pt calmed down.

## 2022-12-24 NOTE — PLAN OF CARE
Problem: Adult Inpatient Plan of Care  Goal: Plan of Care Review  Outcome: Ongoing, Progressing  Goal: Patient-Specific Goal (Individualized)  Outcome: Ongoing, Progressing  Goal: Absence of Hospital-Acquired Illness or Injury  Outcome: Ongoing, Progressing  Goal: Optimal Comfort and Wellbeing  Outcome: Ongoing, Progressing  Goal: Readiness for Transition of Care  Outcome: Ongoing, Progressing     Problem: Diabetic Ketoacidosis  Goal: Fluid and Electrolyte Balance with Absence of Ketosis  Outcome: Ongoing, Progressing     Problem: Diabetes Comorbidity  Goal: Blood Glucose Level Within Targeted Range  Outcome: Ongoing, Progressing     Problem: Adjustment to Illness (Sepsis/Septic Shock)  Goal: Optimal Coping  Outcome: Ongoing, Progressing     Problem: Bleeding (Sepsis/Septic Shock)  Goal: Absence of Bleeding  Outcome: Ongoing, Progressing     Problem: Glycemic Control Impaired (Sepsis/Septic Shock)  Goal: Blood Glucose Level Within Desired Range  Outcome: Ongoing, Progressing     Problem: Infection Progression (Sepsis/Septic Shock)  Goal: Absence of Infection Signs and Symptoms  Outcome: Ongoing, Progressing     Problem: Nutrition Impaired (Sepsis/Septic Shock)  Goal: Optimal Nutrition Intake  Outcome: Ongoing, Progressing

## 2022-12-24 NOTE — PROGRESS NOTES
CONSULTATION LIAISON PSYCHIATRY PROGRESS NOTE    Patient Name: Earl Abdul  MRN: [id number]  Patient Class: IP- Inpatient  Admission Date: 12/23/2022  Attending Physician: Elsa Camargo*      SUBJECTIVE:   Earl Abdul is a 62 y.o. female with past psychiatric history of EtOH Use Disorder & past pertinent medical history of T2DM, sarcoidosis, PRES, pancreatitis, migraines presents to the ED/ admitted to the hospital for Alcoholic ketoacidosis    Psychiatry consulted for EtOH withdrawal    Patient seen resting in bed, visibly tremulous but with eyes closed and falling asleep during examination.  at bedside, discussed that given severity of EtOH withdrawal, recommendation that she continue BZD taper in hospital.  voiced agreement with the plan and noted she had history of withdrawal seizures. Patient did awaken briefly to insist that she could go home but otherwise was asleep through exam. She was agitated both at night and in the morning, talking about going home. She did receive haldol 5mg at 0644 and zyprexa 5mg at 1708 today for non-redirectable agitation. She has received 10mg of PRN ativan for withdrawal as has been on valium 10mg q6h.       OBJECTIVE:    Mental Status Exam:  Appearance: malnourished, lying in bed  Behavior/Cooperation:  drowsy, insisting that she can go home  Speech: non-spontaneous  Mood: unable to assess  Affect: unable to assess  Thought Process:  unable to assess  Thought Content:  unable to assess    Orientation:  unable to assess  Memory: Unable to assess  Attention Span/Concentration: Decreased  Insight: poor  Judgment: poor    CAM ICU positive? Unable to assess    ASSESSMENT & RECOMMENDATIONS   Alcohol Use Disorder, Severe  Alcohol Withdrawal  Continue valium 10mg q6h  May give PRN haldol 5mg po/IM q6h for non-redirectable agitation; would avoid giving IV haldol given risk of QT prolongation.  Would also avoid IM zyprexa being given with an hour of BZD  being administered parenterally given risk of respiratory depression.  Would continue to monitor EKG, especially if receiving increased PRN's; last Qtc 438ms on 12/23.  Continue PRN ativan for CIWA >8 or if 2 criteria are met: Systolic BP>160, Diastolic BP>100 or HR>100  Continue Vitamin supplementation - Thiamine 100 mg daily, Folate 1 mg daily, MVI daily, and Vitamin B12  Seizure precautions. Seizures generally occur between 12-48 hours after alcohol cessation. Monitor for hypoglycemia, hypocalcemia, and hypomagnesemia as these can predispose to seizure.  If patient asking to leave AMA, may assess for patient's ability to understand and verbalize the risks and benefits of leaving AMA (including seizure, falls, confusion, death) as well as staying in hospital (to complete taper safely) to assess for capacity to leave AMA.    RISK ASSESSMENT  NO NEED FOR PEC       FOLLOW UP  Will follow up while in house    DISPOSITION- Once medically cleared;   Defer to medical team    Please contact ON CALL psychiatry service (24/7) for any acute issues that may arise.    Dr. Levar Pantoja   Psychiatry  [facility]  12/24/2022 5:56 PM        --------------------------------------------------------------------------------------------------------------------------------------------------------------------------------------------------------------------------------------    CONTINUED OBJECTIVE clinical data & findings reviewed and noted for above decision making    Current Medications:   Scheduled Meds:    diazePAM  10 mg Oral Q6H    folic acid  1 mg Oral Daily    haloperidol lactate  5 mg Intravenous Once    levothyroxine  50 mcg Oral Before breakfast    multivitamin  1 tablet Oral Daily    pantoprazole  40 mg Oral BID AC    thiamine  100 mg Oral Daily     PRN Meds: dextrose 10%, dextrose 10%, glucagon (human recombinant), glucose, glucose, influenza, lorazepam, naloxone, ondansetron, sodium chloride  0.9%    Allergies:   Review of patient's allergies indicates:   Allergen Reactions    Lortab [hydrocodone-acetaminophen] Itching    Promethazine Itching and Other (See Comments)       Vitals  Vitals:    12/24/22 1611   BP: (!) 175/81   Pulse: (!) 112   Resp: 18   Temp: 98.7 °F (37.1 °C)       Labs/Imaging/Studies:  Recent Results (from the past 24 hour(s))   POCT glucose    Collection Time: 12/23/22  6:20 PM   Result Value Ref Range    POCT Glucose 122 (H) 70 - 110 mg/dL   Basic Metabolic Panel (BMP)    Collection Time: 12/23/22  7:47 PM   Result Value Ref Range    Sodium 138 136 - 145 mmol/L    Potassium 3.5 3.5 - 5.1 mmol/L    Chloride 100 95 - 110 mmol/L    CO2 23 23 - 29 mmol/L    Glucose 126 (H) 70 - 110 mg/dL    BUN 8 8 - 23 mg/dL    Creatinine 1.0 0.5 - 1.4 mg/dL    Calcium 8.1 (L) 8.7 - 10.5 mg/dL    Anion Gap 15 8 - 16 mmol/L    eGFR >60.0 >60 mL/min/1.73 m^2   POCT glucose    Collection Time: 12/23/22  8:13 PM   Result Value Ref Range    POCT Glucose 121 (H) 70 - 110 mg/dL   POCT glucose    Collection Time: 12/23/22 11:00 PM   Result Value Ref Range    POCT Glucose 131 (H) 70 - 110 mg/dL   Basic Metabolic Panel (BMP)    Collection Time: 12/23/22 11:10 PM   Result Value Ref Range    Sodium 136 136 - 145 mmol/L    Potassium 3.3 (L) 3.5 - 5.1 mmol/L    Chloride 99 95 - 110 mmol/L    CO2 23 23 - 29 mmol/L    Glucose 135 (H) 70 - 110 mg/dL    BUN 7 (L) 8 - 23 mg/dL    Creatinine 1.0 0.5 - 1.4 mg/dL    Calcium 7.9 (L) 8.7 - 10.5 mg/dL    Anion Gap 14 8 - 16 mmol/L    eGFR >60.0 >60 mL/min/1.73 m^2   POCT glucose    Collection Time: 12/24/22  1:07 AM   Result Value Ref Range    POCT Glucose 163 (H) 70 - 110 mg/dL   POCT glucose    Collection Time: 12/24/22  3:34 AM   Result Value Ref Range    POCT Glucose 156 (H) 70 - 110 mg/dL   TSH    Collection Time: 12/24/22  5:56 AM   Result Value Ref Range    TSH 0.820 0.400 - 4.000 uIU/mL   POCT glucose    Collection Time: 12/24/22  6:17 AM   Result Value Ref Range    POCT  Glucose 170 (H) 70 - 110 mg/dL   POCT glucose    Collection Time: 12/24/22  7:38 AM   Result Value Ref Range    POCT Glucose 183 (H) 70 - 110 mg/dL   POCT glucose    Collection Time: 12/24/22  9:04 AM   Result Value Ref Range    POCT Glucose 184 (H) 70 - 110 mg/dL   Basic Metabolic Panel (BMP)    Collection Time: 12/24/22 11:21 AM   Result Value Ref Range    Sodium 141 136 - 145 mmol/L    Potassium 3.6 3.5 - 5.1 mmol/L    Chloride 105 95 - 110 mmol/L    CO2 26 23 - 29 mmol/L    Glucose 165 (H) 70 - 110 mg/dL    BUN 4 (L) 8 - 23 mg/dL    Creatinine 0.8 0.5 - 1.4 mg/dL    Calcium 7.9 (L) 8.7 - 10.5 mg/dL    Anion Gap 10 8 - 16 mmol/L    eGFR >60.0 >60 mL/min/1.73 m^2   POCT glucose    Collection Time: 12/24/22 11:34 AM   Result Value Ref Range    POCT Glucose 176 (H) 70 - 110 mg/dL     Imaging Results              US Liver with Doppler (xpd) (Final result)  Result time 12/23/22 10:49:34      Final result by Kevin Guerra MD (12/23/22 10:49:34)                   Impression:      Satisfactory Doppler evaluation of the liver.    Hepatosplenomegaly.    Stable splenic complex cystic lesion.    Electronically signed by resident: Alejandro Gardner  Date:    12/23/2022  Time:    09:55    Electronically signed by: Kevin Guerra MD  Date:    12/23/2022  Time:    10:49               Narrative:    EXAMINATION:  US LIVER WITH DOPPLER    CLINICAL HISTORY:  Alcohol abuse, here for ketoacidosis;    TECHNIQUE:  Complete abdominal ultrasound with doppler.  Color and spectral Doppler were performed.    COMPARISON:  CT 05/01/2022.    Ultrasound 04/17/2021.    FINDINGS:  Technically limited exam due to the patient breath-holding technique.    The visualized portion of the pancreas is unremarkable.    The liver is enlarged in size measuring 20.5 cm.  The liver demonstrates diffusely heterogeneous echotexture.  No focal hepatic lesions are seen.    The gallbladder is unremarkable with no evidence of calculi.  The common duct is not  dilated, measuring 2 mm.  The gallbladder wall is not thickened.  There is no sonographic Mccollum sign.  No dilated intrahepatic radicles are seen.    The spleen is enlarged in size measuring 13.5 x 4.4 cm with a homogeneous echotexture.  There is a stable complex cystic lesion in the spleen.    There is no evidence of ascites.    Borderline enlarged portal vein measuring 15 mm.  The main portal vein, right portal vein, left portal vein, middle hepatic vein, right hepatic vein, left hepatic vein, SMV, and IVC are patent with proper directional flow.  The main hepatic artery is patent. The umbilical vein is not patent.                                       X-Ray Chest AP Portable (Final result)  Result time 12/23/22 04:58:27      Final result by Aayush Hoffman MD (12/23/22 04:58:27)                   Impression:      No convincing radiographic evidence of acute intrathoracic process on this single view.      Electronically signed by: Aayush Hoffman MD  Date:    12/23/2022  Time:    04:58               Narrative:    EXAMINATION:  XR CHEST AP PORTABLE    CLINICAL HISTORY:  Hypoxemia    TECHNIQUE:  Single frontal view of the chest was performed.    COMPARISON:  Chest radiograph 09/26/2022, CT chest 03/25/2022    FINDINGS:  There is soft tissue attenuation relating to body habitus as well as bilateral breast prosthesis.  Cardiac silhouette is stable in size.  There is mild atherosclerotic calcification of the thoracic aorta.  Lung volumes are mildly diminished with bibasilar atelectasis.  No new large confluent airspace consolidation appreciated.  No significant volume of pleural fluid or pneumothorax appreciated.  Osseous structures demonstrate degenerative changes.

## 2022-12-24 NOTE — TREATMENT PLAN
BRIEF GI TREATMENT PLAN    Over night, patient continued to withdraw from alcohol with high CIWA scores.   Tolerating clear liquids.   Still tremulous and agitated.   Hb stable at 12.0    Vitals:    12/24/22 1128 12/24/22 1214 12/24/22 1545 12/24/22 1611   BP:  (!) 159/82  (!) 175/81   BP Location:  Right arm  Right arm   Patient Position:  Lying  Lying   Pulse: 89 99 80 (!) 112   Resp:  18  18   Temp:  98.3 °F (36.8 °C)  98.7 °F (37.1 °C)   TempSrc:  Oral  Oral   SpO2:  95%  (!) 91%   Weight:       Height:          Assessment/Plan:      Bloody emesis  62-year-old with history of alcohol abuse presents with alcohol intoxications and recurrent nausea vomiting.     Patient's nausea vomiting likely related to alcohol abuse possibilities of infectious gastroenteritis.    LFTs are mildly elevated, elevated WBC, lipase within normal range.  Given reported small amount of blood in her last episode of vomiting, concerns for mild GI bleeding due to recurrent nausea and vomiting.  Less clinically consistent with varcieal bleeding.   Given most recent ultrasound and clinical findings less concerned about cirrhosis. Still withdrawing from alcohol     EGD 4/2021; normal esophagus with gastritis   EGD 2/2020: small hiatal hernia. Gastritis.   C-scope 9/2020: unspecific colitis in ascending and sigmoid colon. Internal hemorrhoids.     PLAN:  -- continue alcohol withdrawal management  -- anti emetics PRNs  -- continue PPIs 40 mg b.i.d.  -- okay for clear liquids  -- continue monitoring, if recurrent hematemesis or melena and worsening anemia, will consider further evaluation with EGD. Ideally after patient is stable and no longer in alcohol withdrawal.    Nima Johnson MD  PGY-IV, GI

## 2022-12-24 NOTE — PLAN OF CARE
Problem: Adult Inpatient Plan of Care  Goal: Plan of Care Review  Outcome: Ongoing, Progressing  Flowsheets (Taken 12/24/2022 1524)  Plan of Care Reviewed With:   patient   spouse  Goal: Patient-Specific Goal (Individualized)  Outcome: Ongoing, Progressing  Goal: Absence of Hospital-Acquired Illness or Injury  Outcome: Ongoing, Progressing  Intervention: Identify and Manage Fall Risk  Flowsheets (Taken 12/24/2022 1524)  Safety Promotion/Fall Prevention:   assistive device/personal item within reach   Fall Risk reviewed with patient/family   Fall Risk signage in place   family to remain at bedside   side rails raised x 2   medications reviewed   nonskid shoes/socks when out of bed  Intervention: Prevent Skin Injury  Flowsheets (Taken 12/24/2022 1524)  Body Position:   position changed independently   weight shifting  Skin Protection: adhesive use limited  Intervention: Prevent and Manage VTE (Venous Thromboembolism) Risk  Flowsheets (Taken 12/24/2022 1524)  Activity Management:   Ambulated to bathroom - L4   Ambulated in room - L4  VTE Prevention/Management:   bleeding precations maintained   bleeding risk assessed   intravenous hydration  Range of Motion: active ROM (range of motion) encouraged  Intervention: Prevent Infection  Flowsheets (Taken 12/24/2022 1524)  Infection Prevention:   environmental surveillance performed   personal protective equipment utilized   single patient room provided   rest/sleep promoted   equipment surfaces disinfected   hand hygiene promoted  Goal: Optimal Comfort and Wellbeing  Outcome: Ongoing, Progressing  Intervention: Monitor Pain and Promote Comfort  Flowsheets (Taken 12/24/2022 1524)  Pain Management Interventions:   around-the-clock dosing utilized   pillow support provided  Intervention: Provide Person-Centered Care  Flowsheets (Taken 12/24/2022 1524)  Trust Relationship/Rapport:   care explained   questions encouraged   choices provided   reassurance provided   emotional  support provided   thoughts/feelings acknowledged   questions answered   empathic listening provided  Goal: Readiness for Transition of Care  Outcome: Ongoing, Progressing     Problem: Diabetic Ketoacidosis  Goal: Fluid and Electrolyte Balance with Absence of Ketosis  Outcome: Ongoing, Progressing  Intervention: Monitor and Manage Ketoacidosis  Flowsheets (Taken 12/24/2022 1524)  Fluid/Electrolyte Management:   intravenous fluids adjusted   fluids adjusted   fluids provided  Glycemic Management:   blood glucose monitored   oral hydration promoted     Problem: Diabetes Comorbidity  Goal: Blood Glucose Level Within Targeted Range  Outcome: Ongoing, Progressing  Intervention: Monitor and Manage Glycemia  Flowsheets (Taken 12/24/2022 1524)  Glycemic Management:   blood glucose monitored   oral hydration promoted     Problem: Adjustment to Illness (Sepsis/Septic Shock)  Goal: Optimal Coping  Outcome: Ongoing, Progressing  Intervention: Optimize Psychosocial Adjustment to Illness  Flowsheets (Taken 12/24/2022 1524)  Supportive Measures:   active listening utilized   counseling provided   self-care encouraged   relaxation techniques promoted   problem-solving facilitated  Family/Support System Care:   presence promoted   involvement promoted   support provided     Problem: Bleeding (Sepsis/Septic Shock)  Goal: Absence of Bleeding  Outcome: Ongoing, Progressing  Intervention: Monitor and Manage Bleeding  Flowsheets (Taken 12/24/2022 1524)  Bleeding Precautions: blood pressure closely monitored     Problem: Glycemic Control Impaired (Sepsis/Septic Shock)  Goal: Blood Glucose Level Within Desired Range  Outcome: Ongoing, Progressing  Intervention: Optimize Glycemic Control  Flowsheets (Taken 12/24/2022 1524)  Glycemic Management:   blood glucose monitored   oral hydration promoted     Problem: Infection Progression (Sepsis/Septic Shock)  Goal: Absence of Infection Signs and Symptoms  Outcome: Ongoing,  Progressing  Intervention: Initiate Sepsis Management  Flowsheets (Taken 12/24/2022 1524)  Infection Prevention:   environmental surveillance performed   personal protective equipment utilized   single patient room provided   rest/sleep promoted   equipment surfaces disinfected   hand hygiene promoted  Infection Management: aseptic technique maintained  Isolation Precautions:   protective   precautions maintained  Intervention: Promote Stabilization  Flowsheets (Taken 12/24/2022 1524)  Fever Reduction/Comfort Measures:   lightweight clothing   lightweight bedding  Fluid/Electrolyte Management:   intravenous fluids adjusted   fluids adjusted   fluids provided  Intervention: Promote Recovery  Flowsheets (Taken 12/24/2022 1524)  Sleep/Rest Enhancement:   awakenings minimized   consistent schedule promoted   relaxation techniques promoted  Activity Management:   Ambulated to bathroom - L4   Ambulated in room - L4     Problem: Nutrition Impaired (Sepsis/Septic Shock)  Goal: Optimal Nutrition Intake  Outcome: Ongoing, Progressing

## 2022-12-24 NOTE — SUBJECTIVE & OBJECTIVE
Interval History: Patient noted to have increased agitation overnight, despite being on 10mg q6 valium with PRN ativan. Was given haldol IV by night team.  at bedside along with primary. Patient requests to go home to see grandchildren specifically. Primary team informed her and  regarding the large safety risk given her hx and current state of tremors while on medication. Will advance diet if given approval by GI, and will shift accucehcks to 4x daily as they have been stable since admission with >10x readings.      Review of Systems   Constitutional:  Positive for appetite change, chills and fatigue.   HENT:  Negative for congestion, nosebleeds, rhinorrhea, sneezing and sore throat.    Cardiovascular:  Positive for palpitations. Negative for chest pain and leg swelling.   Gastrointestinal:  Positive for nausea and vomiting. Negative for abdominal distention and abdominal pain.   Endocrine: Negative.    Genitourinary: Negative.    Musculoskeletal:  Negative for arthralgias, back pain, joint swelling and myalgias.   Skin: Negative.    Allergic/Immunologic: Negative.    Neurological:  Positive for tremors, weakness and headaches. Negative for seizures and facial asymmetry.   Psychiatric/Behavioral:  Positive for agitation, confusion, decreased concentration, dysphoric mood and sleep disturbance. The patient is nervous/anxious and is hyperactive.    Objective:     Vital Signs (Most Recent):  Temp: 98.3 °F (36.8 °C) (12/24/22 1214)  Pulse: 99 (12/24/22 1214)  Resp: 18 (12/24/22 1214)  BP: (!) 159/82 (12/24/22 1214)  SpO2: 95 % (12/24/22 1214)   Vital Signs (24h Range):  Temp:  [98.3 °F (36.8 °C)-98.9 °F (37.2 °C)] 98.3 °F (36.8 °C)  Pulse:  [] 99  Resp:  [16-20] 18  SpO2:  [92 %-95 %] 95 %  BP: (140-195)/(65-86) 159/82     Weight: 79.5 kg (175 lb 4.3 oz)  Body mass index is 28.29 kg/m².  No intake or output data in the 24 hours ending 12/24/22 1400   Physical Exam  Constitutional:       General: She  is not in acute distress.     Appearance: Normal appearance. She is ill-appearing. She is not toxic-appearing or diaphoretic.   HENT:      Head: Normocephalic and atraumatic.      Nose: Nose normal.      Mouth/Throat:      Mouth: Mucous membranes are moist.      Pharynx: Oropharynx is clear.   Cardiovascular:      Rate and Rhythm: Regular rhythm. Tachycardia present.   Pulmonary:      Effort: No respiratory distress.      Breath sounds: No wheezing.   Abdominal:      General: There is no distension.      Tenderness: There is no abdominal tenderness. There is no guarding.   Musculoskeletal:         General: No swelling or tenderness.      Right lower leg: No edema.      Left lower leg: No edema.   Skin:     Coloration: Skin is not jaundiced.      Findings: Bruising (Abdomen, nontender) present.   Neurological:      General: No focal deficit present.      Mental Status: She is alert and oriented to person, place, and time.   Psychiatric:         Attention and Perception: She is attentive.         Mood and Affect: Mood is anxious.         Speech: Speech is slurred.         Behavior: Behavior is agitated and hyperactive.       Significant Labs: All pertinent labs within the past 24 hours have been reviewed.    Significant Imaging: I have reviewed all pertinent imaging results/findings within the past 24 hours.

## 2022-12-24 NOTE — NURSING
Pt agitated again, stating she needs to go home, requesting to speak with MD. Pt sleeping when MD arrived to speak with her. MD states we can minimize disturbing pt's sleep from q2hr BG checks, since pt's BG levels have been stable in a good range. Will check next BG when pt wakes up.

## 2022-12-24 NOTE — PROGRESS NOTES
[facility] - Tele[facility] Medicine  Progress Note    Patient Name: Earl Abdul  MRN: [id number]  Patient Class: IP- Inpatient   Admission Date: 12/23/2022  Length of Stay: 1 days  Attending Physician: Elsa Camargo*  Primary Care Provider: Andrew Rodriguez MD        Subjective:     Principal Problem:Alcoholic ketoacidosis        HPI:  Jason a 62-year-old female with a significant past medical history of alcohol use disorder with multiple hospitalizations and rehab attempts, CLL, breast cancer, T2DM, COPD, and suicide attempt presenting to the emergency department after roughly 4 days of persistent nausea and emesis.  She states she is been binge drinking for roughly 7 days, with about 1 bottle of vodka per day.  She also states due to her depression she is not been eating for a week as well.  Last drink was roughly 6-7 hours upon time of evaluation.  For her  she was recently in a rehab facility roughly 2 weeks ago.  She does have history of delirium tremens, and seizures due to alcohol withdrawals.  She denies any hallucinations, or seizure-like activity at this time.  She is however tremulous.  She denies any fevers, chills ,shortness of breath, or sweats. She further denies any abdominal tenderness. Endorses 1 time episode of bloody emesis which has not returned.  She states she stopped taking her regular medications roughly 5 days ago.    In the ED: Glucose was found to be in the 50s, She was given IV thiamine followed by D5 normal saline.  She was also given IV Ativan.        Overview/Hospital Course:  Admitted for alcohol withdrawals with prior hx of seizures as a result. Started on 10mg valium q8 increased to q6 per psychiatry recommendations. PRN ativan as needed for further withdrawals CIWA >8. Overnight patient became very agitated, threatening to rip out IV lines and walk out the hospital. Patient was given IV haldol by team which helped with the agitation.  at  bedside along with primary team. Patient expressed desire to go home to see grandchildren however is very noticeably tremulous  during this meeting.  expresses safety concerns as well. Psych aware, and will give further recs.       Interval History: Patient noted to have increased agitation overnight, despite being on 10mg q6 valium with PRN ativan. Was given haldol IV by night team.  at bedside along with primary. Patient requests to go home to see grandchildren specifically. Primary team informed her and  regarding the large safety risk given her hx and current state of tremors while on medication. Will advance diet if given approval by GI, and will shift accucehcks to 4x daily as they have been stable since admission with >10x readings.      Review of Systems   Constitutional:  Positive for appetite change, chills and fatigue.   HENT:  Negative for congestion, nosebleeds, rhinorrhea, sneezing and sore throat.    Cardiovascular:  Positive for palpitations. Negative for chest pain and leg swelling.   Gastrointestinal:  Positive for nausea and vomiting. Negative for abdominal distention and abdominal pain.   Endocrine: Negative.    Genitourinary: Negative.    Musculoskeletal:  Negative for arthralgias, back pain, joint swelling and myalgias.   Skin: Negative.    Allergic/Immunologic: Negative.    Neurological:  Positive for tremors, weakness and headaches. Negative for seizures and facial asymmetry.   Psychiatric/Behavioral:  Positive for agitation, confusion, decreased concentration, dysphoric mood and sleep disturbance. The patient is nervous/anxious and is hyperactive.    Objective:     Vital Signs (Most Recent):  Temp: 98.3 °F (36.8 °C) (12/24/22 1214)  Pulse: 99 (12/24/22 1214)  Resp: 18 (12/24/22 1214)  BP: (!) 159/82 (12/24/22 1214)  SpO2: 95 % (12/24/22 1214)   Vital Signs (24h Range):  Temp:  [98.3 °F (36.8 °C)-98.9 °F (37.2 °C)] 98.3 °F (36.8 °C)  Pulse:  [] 99  Resp:  [16-20]  18  SpO2:  [92 %-95 %] 95 %  BP: (140-195)/(65-86) 159/82     Weight: 79.5 kg (175 lb 4.3 oz)  Body mass index is 28.29 kg/m².  No intake or output data in the 24 hours ending 12/24/22 1400   Physical Exam  Constitutional:       General: She is not in acute distress.     Appearance: Normal appearance. She is ill-appearing. She is not toxic-appearing or diaphoretic.   HENT:      Head: Normocephalic and atraumatic.      Nose: Nose normal.      Mouth/Throat:      Mouth: Mucous membranes are moist.      Pharynx: Oropharynx is clear.   Cardiovascular:      Rate and Rhythm: Regular rhythm. Tachycardia present.   Pulmonary:      Effort: No respiratory distress.      Breath sounds: No wheezing.   Abdominal:      General: There is no distension.      Tenderness: There is no abdominal tenderness. There is no guarding.   Musculoskeletal:         General: No swelling or tenderness.      Right lower leg: No edema.      Left lower leg: No edema.   Skin:     Coloration: Skin is not jaundiced.      Findings: Bruising (Abdomen, nontender) present.   Neurological:      General: No focal deficit present.      Mental Status: She is alert and oriented to person, place, and time.   Psychiatric:         Attention and Perception: She is attentive.         Mood and Affect: Mood is anxious.         Speech: Speech is slurred.         Behavior: Behavior is agitated and hyperactive.       Significant Labs: All pertinent labs within the past 24 hours have been reviewed.    Significant Imaging: I have reviewed all pertinent imaging results/findings within the past 24 hours.      Assessment/Plan:      * Alcoholic ketoacidosis  62 year old with singiifcant PMHx of alocohl abuse, with multiple hospitalizations and rehab stays presents with nausea, bloody vomiting, tremors, and malaise for last several days. She also endorses not eating for at least a week. Glucose 50s upon admission, tremors noted. Patient awake, alert, and oriented. Given IV  thiamine and started on D5NS in ED.   - Liver U/S ordered   - Will continue D5NS for now   - Standing valium and prns for withdrawals along with q4 CIWA checks   - POCT glucose switched to 4x daily, sugars runs >140s <190s last 10x checks         Hypothyroidism  Cont home synthroid       Malignant neoplasm of central portion of right breast in female, estrogen receptor positive  Med rec notable for aripiprazole taken once daily. Further hx of CLL/CMBL, WBC currently 32 with no indication of infectious process currently   - Will consult heme/onc, appreciate recs        Hyperlipidemia  Holding statin in setting of elevated LFTs       Bloody emesis  Patient endorsed 1x lepidote of nausea and blooding vomiting. Noted dried red stains on blanket and patient clothes. States has never happened before and she has no hx that would allude to this. Hgb upon admission stable at 12   - Daily CBCs   - EGD from April 2021 was noncontributory  - prontonix 80mg IV followed by 40mg q12   - NPO except for medication administration   - GI consulted, appreciate recs       Alcohol use disorder, severe, dependence  Patient with significant hx of alcohol abuse requiring multiple hospitalizations as well as several rehab admissions per  presetns after 1x week of no eating and binge drinking a 5th of vodka per day. Patient is AAOx3 but tremulous. Last drink was 6-8 hours prior.      Plan    - CIWA driven as below   - Thiamine IV given in  - Monitor daily CMP and closely monitor electrolytes  - Seizure precautions and CIWA q4 ordered    - PO Valium standing 10mg TID for now, increased to q6 per psych recs    - PRN ativan for breakthrough symptoms tremors noted thus far - needed overnight   - Haldol given overnigt due to substantially increased agitation, psych recommends oral or IM administration if needed  - Appreciate further  psych recs             Ciwa driven     CIWA,   Frequency          Medication    D            ose    < 8       Q4H x 6 - If CIWA-AR score < 8, stop None  8?14    Q2H           Lorazepam  1 mg  15?20  Q1H           Lorazepam 2 mg  21?30  Q1H           Lorazepam 3 mg  31?45  Q1H           Lorazepam4 mg  [id number] Minutes          Lorazepam 2 mg        Anxiety and Depression  Patient takes Abilify and Cymbalta  - Will hold pending eval from psychiatry/addiction         VTE Risk Mitigation (From admission, onward)         Ordered     IP VTE HIGH RISK PATIENT  Once         12/23/22 0651     Place sequential compression device  Until discontinued         12/23/22 0651                Discharge Planning   BECCA: 12/28/2022     Code Status: Full Code   Is the patient medically ready for discharge?: No    Reason for patient still in hospital (select all that apply): Patient trending condition, Laboratory test and Treatment                     [place], DO  Department of Hospital Medicine   [facility] - Tele[facility] Stepdown

## 2022-12-25 LAB
ANION GAP SERPL CALC-SCNC: 11 MMOL/L (ref 8–16)
ANION GAP SERPL CALC-SCNC: 12 MMOL/L (ref 8–16)
ANION GAP SERPL CALC-SCNC: 12 MMOL/L (ref 8–16)
ANION GAP SERPL CALC-SCNC: 13 MMOL/L (ref 8–16)
ANION GAP SERPL CALC-SCNC: 9 MMOL/L (ref 8–16)
BASOPHILS # BLD AUTO: 0.01 K/UL (ref 0–0.2)
BASOPHILS NFR BLD: 0.2 % (ref 0–1.9)
BUN SERPL-MCNC: 3 MG/DL (ref 8–23)
BUN SERPL-MCNC: <3 MG/DL (ref 8–23)
CALCIUM SERPL-MCNC: 8.7 MG/DL (ref 8.7–10.5)
CALCIUM SERPL-MCNC: 8.8 MG/DL (ref 8.7–10.5)
CALCIUM SERPL-MCNC: 8.9 MG/DL (ref 8.7–10.5)
CALCIUM SERPL-MCNC: 9 MG/DL (ref 8.7–10.5)
CALCIUM SERPL-MCNC: 9.3 MG/DL (ref 8.7–10.5)
CHLORIDE SERPL-SCNC: 100 MMOL/L (ref 95–110)
CHLORIDE SERPL-SCNC: 102 MMOL/L (ref 95–110)
CHLORIDE SERPL-SCNC: 103 MMOL/L (ref 95–110)
CO2 SERPL-SCNC: 24 MMOL/L (ref 23–29)
CO2 SERPL-SCNC: 25 MMOL/L (ref 23–29)
CO2 SERPL-SCNC: 25 MMOL/L (ref 23–29)
CO2 SERPL-SCNC: 27 MMOL/L (ref 23–29)
CO2 SERPL-SCNC: 29 MMOL/L (ref 23–29)
CREAT SERPL-MCNC: 0.7 MG/DL (ref 0.5–1.4)
CREAT SERPL-MCNC: 0.8 MG/DL (ref 0.5–1.4)
CREAT SERPL-MCNC: 0.9 MG/DL (ref 0.5–1.4)
DIFFERENTIAL METHOD: ABNORMAL
EOSINOPHIL # BLD AUTO: 0 K/UL (ref 0–0.5)
EOSINOPHIL NFR BLD: 0.4 % (ref 0–8)
ERYTHROCYTE [DISTWIDTH] IN BLOOD BY AUTOMATED COUNT: 17.7 % (ref 11.5–14.5)
EST. GFR  (NO RACE VARIABLE): >60 ML/MIN/1.73 M^2
GLUCOSE SERPL-MCNC: 122 MG/DL (ref 70–110)
GLUCOSE SERPL-MCNC: 136 MG/DL (ref 70–110)
GLUCOSE SERPL-MCNC: 140 MG/DL (ref 70–110)
GLUCOSE SERPL-MCNC: 149 MG/DL (ref 70–110)
GLUCOSE SERPL-MCNC: 154 MG/DL (ref 70–110)
HCT VFR BLD AUTO: 33.2 % (ref 37–48.5)
HGB BLD-MCNC: 10.2 G/DL (ref 12–16)
IMM GRANULOCYTES # BLD AUTO: 0.03 K/UL (ref 0–0.04)
IMM GRANULOCYTES NFR BLD AUTO: 0.6 % (ref 0–0.5)
LYMPHOCYTES # BLD AUTO: 3.2 K/UL (ref 1–4.8)
LYMPHOCYTES NFR BLD: 61.5 % (ref 18–48)
MAGNESIUM SERPL-MCNC: 1.5 MG/DL (ref 1.6–2.6)
MCH RBC QN AUTO: 28.4 PG (ref 27–31)
MCHC RBC AUTO-ENTMCNC: 30.7 G/DL (ref 32–36)
MCV RBC AUTO: 93 FL (ref 82–98)
MONOCYTES # BLD AUTO: 0.3 K/UL (ref 0.3–1)
MONOCYTES NFR BLD: 6.1 % (ref 4–15)
NEUTROPHILS # BLD AUTO: 1.6 K/UL (ref 1.8–7.7)
NEUTROPHILS NFR BLD: 31.2 % (ref 38–73)
NRBC BLD-RTO: 0 /100 WBC
PHOSPHATE SERPL-MCNC: <1 MG/DL (ref 2.7–4.5)
PLATELET # BLD AUTO: 47 K/UL (ref 150–450)
PMV BLD AUTO: 10.3 FL (ref 9.2–12.9)
POCT GLUCOSE: 114 MG/DL (ref 70–110)
POCT GLUCOSE: 121 MG/DL (ref 70–110)
POCT GLUCOSE: 149 MG/DL (ref 70–110)
POTASSIUM SERPL-SCNC: 3.4 MMOL/L (ref 3.5–5.1)
POTASSIUM SERPL-SCNC: 3.5 MMOL/L (ref 3.5–5.1)
POTASSIUM SERPL-SCNC: 3.6 MMOL/L (ref 3.5–5.1)
POTASSIUM SERPL-SCNC: 3.7 MMOL/L (ref 3.5–5.1)
POTASSIUM SERPL-SCNC: 3.7 MMOL/L (ref 3.5–5.1)
RBC # BLD AUTO: 3.59 M/UL (ref 4–5.4)
SODIUM SERPL-SCNC: 138 MMOL/L (ref 136–145)
SODIUM SERPL-SCNC: 138 MMOL/L (ref 136–145)
SODIUM SERPL-SCNC: 139 MMOL/L (ref 136–145)
SODIUM SERPL-SCNC: 140 MMOL/L (ref 136–145)
SODIUM SERPL-SCNC: 141 MMOL/L (ref 136–145)
WBC # BLD AUTO: 5.25 K/UL (ref 3.9–12.7)

## 2022-12-25 PROCEDURE — 83735 ASSAY OF MAGNESIUM: CPT

## 2022-12-25 PROCEDURE — 99232 SBSQ HOSP IP/OBS MODERATE 35: CPT | Mod: ,,, | Performed by: HOSPITALIST

## 2022-12-25 PROCEDURE — 36415 COLL VENOUS BLD VENIPUNCTURE: CPT

## 2022-12-25 PROCEDURE — 25000003 PHARM REV CODE 250: Performed by: HOSPITALIST

## 2022-12-25 PROCEDURE — 63600175 PHARM REV CODE 636 W HCPCS

## 2022-12-25 PROCEDURE — 20600001 HC STEP DOWN PRIVATE ROOM

## 2022-12-25 PROCEDURE — 84100 ASSAY OF PHOSPHORUS: CPT

## 2022-12-25 PROCEDURE — 85025 COMPLETE CBC W/AUTO DIFF WBC: CPT | Performed by: HOSPITALIST

## 2022-12-25 PROCEDURE — 99232 PR SUBSEQUENT HOSPITAL CARE,LEVL II: ICD-10-PCS | Mod: ,,, | Performed by: HOSPITALIST

## 2022-12-25 PROCEDURE — 25000003 PHARM REV CODE 250

## 2022-12-25 PROCEDURE — 80048 BASIC METABOLIC PNL TOTAL CA: CPT | Mod: 91

## 2022-12-25 PROCEDURE — 94761 N-INVAS EAR/PLS OXIMETRY MLT: CPT

## 2022-12-25 RX ORDER — SODIUM,POTASSIUM PHOSPHATES 280-250MG
2 POWDER IN PACKET (EA) ORAL EVERY 4 HOURS
Status: DISCONTINUED | OUTPATIENT
Start: 2022-12-25 | End: 2022-12-25

## 2022-12-25 RX ORDER — TRAZODONE HYDROCHLORIDE 100 MG/1
300 TABLET ORAL NIGHTLY PRN
Status: COMPLETED | OUTPATIENT
Start: 2022-12-25 | End: 2022-12-25

## 2022-12-25 RX ORDER — LORAZEPAM 2 MG/ML
2 INJECTION INTRAMUSCULAR EVERY 6 HOURS PRN
Status: DISCONTINUED | OUTPATIENT
Start: 2022-12-25 | End: 2022-12-28 | Stop reason: HOSPADM

## 2022-12-25 RX ORDER — POTASSIUM CHLORIDE 20 MEQ/1
40 TABLET, EXTENDED RELEASE ORAL ONCE
Status: COMPLETED | OUTPATIENT
Start: 2022-12-25 | End: 2022-12-25

## 2022-12-25 RX ORDER — LANOLIN ALCOHOL/MO/W.PET/CERES
800 CREAM (GRAM) TOPICAL EVERY 4 HOURS
Status: COMPLETED | OUTPATIENT
Start: 2022-12-25 | End: 2022-12-25

## 2022-12-25 RX ADMIN — DIAZEPAM 10 MG: 5 TABLET ORAL at 06:12

## 2022-12-25 RX ADMIN — LORAZEPAM 2 MG: 2 INJECTION INTRAMUSCULAR; INTRAVENOUS at 10:12

## 2022-12-25 RX ADMIN — TRAZODONE HYDROCHLORIDE 300 MG: 100 TABLET ORAL at 09:12

## 2022-12-25 RX ADMIN — PANTOPRAZOLE SODIUM 40 MG: 40 TABLET, DELAYED RELEASE ORAL at 06:12

## 2022-12-25 RX ADMIN — LORAZEPAM 2 MG: 2 INJECTION INTRAMUSCULAR; INTRAVENOUS at 05:12

## 2022-12-25 RX ADMIN — SODIUM PHOSPHATE, MONOBASIC, MONOHYDRATE 39.99 MMOL: 276; 142 INJECTION, SOLUTION INTRAVENOUS at 09:12

## 2022-12-25 RX ADMIN — DIAZEPAM 10 MG: 5 TABLET ORAL at 12:12

## 2022-12-25 RX ADMIN — THIAMINE HCL TAB 100 MG 100 MG: 100 TAB at 09:12

## 2022-12-25 RX ADMIN — HALOPERIDOL LACTATE 5 MG: 5 INJECTION, SOLUTION INTRAMUSCULAR at 03:12

## 2022-12-25 RX ADMIN — PANTOPRAZOLE SODIUM 40 MG: 40 TABLET, DELAYED RELEASE ORAL at 03:12

## 2022-12-25 RX ADMIN — LEVOTHYROXINE SODIUM 50 MCG: 50 TABLET ORAL at 06:12

## 2022-12-25 RX ADMIN — THERA TABS 1 TABLET: TAB at 09:12

## 2022-12-25 RX ADMIN — Medication 800 MG: at 09:12

## 2022-12-25 RX ADMIN — POTASSIUM CHLORIDE 40 MEQ: 1500 TABLET, EXTENDED RELEASE ORAL at 09:12

## 2022-12-25 RX ADMIN — FOLIC ACID 1 MG: 1 TABLET ORAL at 09:12

## 2022-12-25 RX ADMIN — LORAZEPAM 2 MG: 2 INJECTION INTRAMUSCULAR at 05:12

## 2022-12-25 RX ADMIN — DIAZEPAM 10 MG: 5 TABLET ORAL at 10:12

## 2022-12-25 RX ADMIN — POTASSIUM & SODIUM PHOSPHATES POWDER PACK 280-160-250 MG 2 PACKET: 280-160-250 PACK at 09:12

## 2022-12-25 RX ADMIN — DIAZEPAM 10 MG: 5 TABLET ORAL at 05:12

## 2022-12-25 RX ADMIN — Medication 800 MG: at 03:12

## 2022-12-25 NOTE — NURSING
Pt very restless, IV got caught and pulled out and pt has very difficult veins. On-call notified, ok for pt to not have any IV access at the moment and day team will reevaluate need for possible midline access.

## 2022-12-25 NOTE — PROGRESS NOTES
[facility] - Tele[facility] Medicine  Progress Note    Patient Name: Earl Abdul  MRN: [id number]  Patient Class: IP- Inpatient   Admission Date: 12/23/2022  Length of Stay: 2 days  Attending Physician: Elsa Camargo*  Primary Care Provider: Andrew Rodriguez MD        Subjective:     Principal Problem:Alcoholic ketoacidosis        HPI:  Jason a 62-year-old female with a significant past medical history of alcohol use disorder with multiple hospitalizations and rehab attempts, CLL, breast cancer, T2DM, COPD, and suicide attempt presenting to the emergency department after roughly 4 days of persistent nausea and emesis.  She states she is been binge drinking for roughly 7 days, with about 1 bottle of vodka per day.  She also states due to her depression she is not been eating for a week as well.  Last drink was roughly 6-7 hours upon time of evaluation.  For her  she was recently in a rehab facility roughly 2 weeks ago.  She does have history of delirium tremens, and seizures due to alcohol withdrawals.  She denies any hallucinations, or seizure-like activity at this time.  She is however tremulous.  She denies any fevers, chills ,shortness of breath, or sweats. She further denies any abdominal tenderness. Endorses 1 time episode of bloody emesis which has not returned.  She states she stopped taking her regular medications roughly 5 days ago.    In the ED: Glucose was found to be in the 50s, She was given IV thiamine followed by D5 normal saline.  She was also given IV Ativan.        Overview/Hospital Course:  Admitted for alcohol withdrawals with prior hx of seizures as a result. Started on 10mg valium q8 increased to q6 per psychiatry recommendations. PRN ativan as needed for further withdrawals CIWA >8. Overnight patient became very agitated, threatening to rip out IV lines and walk out the hospital. Patient was given IV haldol by team which helped with the agitation.  at  "bedside along with primary team. Patient expressed desire to go home to see grandchildren however is very noticeably tremulous  during this meeting.  expresses safety concerns as well. Psych aware, and will give further recs. In evening, patient found to be increasingly agitated, pulling IV lines, disabling her bed alarm, getting out of bed and threatening to leave AMA. Given PRN ativan and Zyprexa. She had inconsistent train of thought, slurring her speech. She was further unable to demonstrate insight into her medical condition as well as what may happen if she were to leave. In the following morning patient as found wondering the halls, asking to find the vending machine. She was redirected back to her room. When primary met with her in am she stated herself she feels "too sick" which is far removed from her previous request to go home. Per psych recs, will avoid Zyprexa, and administer haldol IM or PO.       Interval History: Acute event late in afternoon, resulting in requiring haldol, and Zyprexa for agitation. Line access lost as well. At the time patient found to be unclear of current medical state and could not explain  the consequences of leaving AMA. In early am patient found to be wandering halls looking for vending machine. Was able to be redirected to her room. Today, patient and  at bedside  again asking to be discharged.  is understanding and reasonable regarding the safety concerns while being on max dosage of valium requiring PRN support.     Review of Systems   Constitutional:  Positive for appetite change, chills and fatigue.   HENT:  Negative for congestion, nosebleeds, rhinorrhea, sneezing and sore throat.    Cardiovascular:  Positive for palpitations. Negative for chest pain and leg swelling.   Gastrointestinal:  Positive for nausea and vomiting. Negative for abdominal distention and abdominal pain.   Endocrine: Negative.    Genitourinary: Negative.    Musculoskeletal:  " Negative for arthralgias, back pain, joint swelling and myalgias.   Skin: Negative.    Allergic/Immunologic: Negative.    Neurological:  Positive for tremors, speech difficulty, weakness and headaches. Negative for seizures and facial asymmetry.   Psychiatric/Behavioral:  Positive for agitation, behavioral problems, confusion, decreased concentration, dysphoric mood and sleep disturbance. The patient is nervous/anxious and is hyperactive.    Objective:     Vital Signs (Most Recent):  Temp: 98.2 °F (36.8 °C) (12/25/22 0824)  Pulse: 109 (12/25/22 0824)  Resp: 20 (12/25/22 0824)  BP: (!) 173/82 (12/25/22 0824)  SpO2:  (refused; nurse is in the room) (12/25/22 1229)   Vital Signs (24h Range):  Temp:  [97.4 °F (36.3 °C)-99 °F (37.2 °C)] 98.2 °F (36.8 °C)  Pulse:  [] 109  Resp:  [16-20] 20  SpO2:  [91 %-95 %] 95 %  BP: (141-182)/(65-89) 173/82     Weight: 79.5 kg (175 lb 4.3 oz)  Body mass index is 28.29 kg/m².  No intake or output data in the 24 hours ending 12/25/22 1235   Physical Exam  Constitutional:       General: She is not in acute distress.     Appearance: Normal appearance. She is ill-appearing. She is not toxic-appearing or diaphoretic.   HENT:      Head: Normocephalic and atraumatic.      Nose: Nose normal.      Mouth/Throat:      Mouth: Mucous membranes are moist.      Pharynx: Oropharynx is clear.   Cardiovascular:      Rate and Rhythm: Regular rhythm. Tachycardia present.   Pulmonary:      Effort: No respiratory distress.      Breath sounds: No wheezing.   Abdominal:      General: There is no distension.      Tenderness: There is no abdominal tenderness. There is no guarding.   Musculoskeletal:         General: No swelling or tenderness.      Right lower leg: No edema.      Left lower leg: No edema.   Skin:     Coloration: Skin is not jaundiced.      Findings: Bruising (Abdomen, nontender) present.   Neurological:      General: No focal deficit present.      Mental Status: She is alert and oriented to  person, place, and time.   Psychiatric:         Attention and Perception: She is attentive.         Mood and Affect: Mood is anxious and depressed.         Speech: Speech is slurred.         Behavior: Behavior is agitated and hyperactive.       Significant Labs: All pertinent labs within the past 24 hours have been reviewed.    Significant Imaging: I have reviewed all pertinent imaging results/findings within the past 24 hours.      Assessment/Plan:      * Alcoholic ketoacidosis  62 year old with singiifcant PMHx of alocohl abuse, with multiple hospitalizations and rehab stays presents with nausea, bloody vomiting, tremors, and malaise for last several days. She also endorses not eating for at least a week. Glucose 50s upon admission, tremors noted. Patient awake, alert, and oriented. Given IV thiamine and started on D5NS in ED.   - Liver U/S ordered   - Will continue D5NS for now   - Standing valium and prns for withdrawals along with q4 CIWA checks   - POCT glucose switched to 4x daily, sugars runs >140s <190s last 10x checks         Hypothyroidism  Cont home synthroid       Malignant neoplasm of central portion of right breast in female, estrogen receptor positive  Med rec notable for aripiprazole taken once daily. Further hx of CLL/CMBL, WBC currently 32 with no indication of infectious process currently   - Will consult heme/onc, appreciate recs        Hyperlipidemia  Holding statin in setting of elevated LFTs       Bloody emesis  Patient endorsed 1x lepidote of nausea and blooding vomiting. Noted dried red stains on blanket and patient clothes. States has never happened before and she has no hx that would allude to this. Hgb upon admission stable at 12   - Daily CBCs   - EGD from April 2021 was noncontributory  - prontonix 80mg IV followed by 40mg q12   - NPO except for medication administration   - GI consulted, appreciate recs       Alcohol use disorder, severe, dependence  Patient with significant hx of  "alcohol abuse requiring multiple hospitalizations as well as several rehab admissions per  presetns after 1x week of no eating and binge drinking a 5th of vodka per day. Patient is AAOx3 but tremulous. Last drink was 6-8 hours prior.      Plan    - CIWA driven as below   - Thiamine IV given in  - Monitor daily CMP and closely monitor electrolytes  - Seizure precautions and CIWA q4 ordered    - PO Valium standing 10mg TID for now, increased to q6 per psych recs    - PRN ativan for breakthrough symptoms tremors noted thus far - needed overnight   - Haldol given overnigt due to substantially increased agitation, psych recommends oral or IM administration if needed  - Per psych, will use IM/PO haldol for increased agitation   - Episodes of increased agitation, requiring haldol and Zyprexa PRN support, along with her max dose of standing valium. Patient is redirectable at one point stating "she's very sick." Multiple requests however to be discharged but has been unable to voice understanding of her current medical condition or the consequences if she chose to stop treatment altogether. Patient also found to slur.             Ciwa driven     CIWA,   Frequency          Medication    D            ose    < 8      Q4H x 6 - If CIWA-AR score < 8, stop None  8?14    Q2H           Lorazepam  1 mg  15?20  Q1H           Lorazepam 2 mg  21?30  Q1H           Lorazepam 3 mg  31?45  Q1H           Lorazepam4 mg  [id number] Minutes          Lorazepam 2 mg        Anxiety and Depression  Patient takes Abilify and Cymbalta  - Will hold pending eval from psychiatry/addiction         VTE Risk Mitigation (From admission, onward)         Ordered     IP VTE HIGH RISK PATIENT  Once         12/23/22 0651     Place sequential compression device  Until discontinued         12/23/22 0651                Discharge Planning   BECCA: 12/28/2022     Code Status: Full Code   Is the patient medically ready for discharge?: No    Reason for " patient still in hospital (select all that apply): Patient trending condition, Laboratory test, Treatment and Consult recommendations                     [place], DO  Department of Hospital Medicine   [facility] - Tele[facility] Stepdown

## 2022-12-25 NOTE — ASSESSMENT & PLAN NOTE
"Patient with significant hx of alcohol abuse requiring multiple hospitalizations as well as several rehab admissions per  presetns after 1x week of no eating and binge drinking a 5th of vodka per day. Patient is AAOx3 but tremulous. Last drink was 6-8 hours prior.      Plan    - CIWA driven as below   - Thiamine IV given in  - Monitor daily CMP and closely monitor electrolytes  - Seizure precautions and CIWA q4 ordered    - PO Valium standing 10mg TID for now, increased to q6 per psych recs    - PRN ativan for breakthrough symptoms tremors noted thus far - needed overnight   - Haldol given overnigt due to substantially increased agitation, psych recommends oral or IM administration if needed  - Per psych, will use IM/PO haldol for increased agitation   - Episodes of increased agitation, requiring haldol and Zyprexa PRN support, along with her max dose of standing valium. Patient is redirectable at one point stating "she's very sick." Multiple requests however to be discharged but has been unable to voice understanding of her current medical condition or the consequences if she chose to stop treatment altogether. Patient also found to slur.             Ciwa driven     CIWA,   Frequency          Medication    D            ose    < 8      Q4H x 6 - If CIWA-AR score < 8, stop None  8?14    Q2H           Lorazepam  1 mg  15?20  Q1H           Lorazepam 2 mg  21?30  Q1H           Lorazepam 3 mg  31?45  Q1H           Lorazepam4 mg  [id number] Minutes          Lorazepam 2 mg      "

## 2022-12-25 NOTE — SUBJECTIVE & OBJECTIVE
Interval History: Acute event late in afternoon, resulting in requiring haldol, and Zyprexa for agitation. Line access lost as well. At the time patient found to be unclear of current medical state and could not explain  the consequences of leaving AMA. In early am patient found to be wandering halls looking for vending machine. Was able to be redirected to her room. Today, patient and  at bedside  again asking to be discharged.  is understanding and reasonable regarding the safety concerns while being on max dosage of valium requiring PRN support.     Review of Systems   Constitutional:  Positive for appetite change, chills and fatigue.   HENT:  Negative for congestion, nosebleeds, rhinorrhea, sneezing and sore throat.    Cardiovascular:  Positive for palpitations. Negative for chest pain and leg swelling.   Gastrointestinal:  Positive for nausea and vomiting. Negative for abdominal distention and abdominal pain.   Endocrine: Negative.    Genitourinary: Negative.    Musculoskeletal:  Negative for arthralgias, back pain, joint swelling and myalgias.   Skin: Negative.    Allergic/Immunologic: Negative.    Neurological:  Positive for tremors, speech difficulty, weakness and headaches. Negative for seizures and facial asymmetry.   Psychiatric/Behavioral:  Positive for agitation, behavioral problems, confusion, decreased concentration, dysphoric mood and sleep disturbance. The patient is nervous/anxious and is hyperactive.    Objective:     Vital Signs (Most Recent):  Temp: 98.2 °F (36.8 °C) (12/25/22 0824)  Pulse: 109 (12/25/22 0824)  Resp: 20 (12/25/22 0824)  BP: (!) 173/82 (12/25/22 0824)  SpO2:  (refused; nurse is in the room) (12/25/22 1229)   Vital Signs (24h Range):  Temp:  [97.4 °F (36.3 °C)-99 °F (37.2 °C)] 98.2 °F (36.8 °C)  Pulse:  [] 109  Resp:  [16-20] 20  SpO2:  [91 %-95 %] 95 %  BP: (141-182)/(65-89) 173/82     Weight: 79.5 kg (175 lb 4.3 oz)  Body mass index is 28.29 kg/m².  No intake  or output data in the 24 hours ending 12/25/22 6782   Physical Exam  Constitutional:       General: She is not in acute distress.     Appearance: Normal appearance. She is ill-appearing. She is not toxic-appearing or diaphoretic.   HENT:      Head: Normocephalic and atraumatic.      Nose: Nose normal.      Mouth/Throat:      Mouth: Mucous membranes are moist.      Pharynx: Oropharynx is clear.   Cardiovascular:      Rate and Rhythm: Regular rhythm. Tachycardia present.   Pulmonary:      Effort: No respiratory distress.      Breath sounds: No wheezing.   Abdominal:      General: There is no distension.      Tenderness: There is no abdominal tenderness. There is no guarding.   Musculoskeletal:         General: No swelling or tenderness.      Right lower leg: No edema.      Left lower leg: No edema.   Skin:     Coloration: Skin is not jaundiced.      Findings: Bruising (Abdomen, nontender) present.   Neurological:      General: No focal deficit present.      Mental Status: She is alert and oriented to person, place, and time.   Psychiatric:         Attention and Perception: She is attentive.         Mood and Affect: Mood is anxious and depressed.         Speech: Speech is slurred.         Behavior: Behavior is agitated and hyperactive.       Significant Labs: All pertinent labs within the past 24 hours have been reviewed.    Significant Imaging: I have reviewed all pertinent imaging results/findings within the past 24 hours.

## 2022-12-25 NOTE — CARE UPDATE
"Was informed by nursing staff of increased agitation around 1710 today. Patient found to be pulling out her lines, disabling bed alarm, getting out of bed, and threatening to leave AMA. Arrived at bedside with patient sitting in chair. She had received her PRN ativan and Zyprexa.Patient visualized to be restless with frequent leg movements, scratching of her arms, lethargic with slightly slurred speech. She stated she wanted to be home to see her grand children open presents and wanted to sign out AMA. When asked about her condition patient stated she feels "good enough to go home". However patient unable to verbalize risks of leaving AMA, was slurring her speech, and had inconsistent train of thought. Was unable to give insight on her current medical conditions or risks of forgoing treatment. Instructed bedside nurse to give valium. Ordered one time dose of haldol if needed. Overnight cross cover at arrived at bedside as well. As she does not have capacity, will PEC patient if she continues to try to leave AMA.   "

## 2022-12-25 NOTE — PLAN OF CARE
"Called to bedside by charge nurse due to patient agitation and attempt to leave AMA once again.  When discussing with patient she states that she is upset that she missed Joshua with her family.  She is oriented to herself, place, however not oriented to time.  When asked the day of the week she said the 22nd, and when corrected she said Friday.  When discussing regarding her understanding of her current position in terms of her alcohol withdrawals, she says that she is just weak."  She also states she just needs to go home and "eat right" and then she will be better.   is at bedside, patient begins blaming  regarding her mental health, and alcohol usage.  She then begins describing her rehab experiences and how terrible they were.  When telling the patient about potential consequences if she would leave without finishing her alcohol withdrawal taper, she states that no, none of that will happen." She does admit to previous history of seizure due to her withdrawals however simply states it will never happen again.  Patient has shown  poor insight into her current condition, as well as the severity of the potential consequences associated with her current diagnoses and treatments.  She has also attempted to leave AMA several times after being redirected regarding the necessity of treatment in keeping her safe.  She also shows poor insight into understanding the safety risk of her going home, and the high likelihood of having to return to the hospital which again, she states it will not happen." PEC order placed after discussion with attending.  Patient  at bedside and part of discussion with primary team, and is in agreement that she is not safe to go home, and understands the necessity of the order.  He does ask to be included in the process regarding facility placement when that time comes, and we will include him when discussing with case management.  "

## 2022-12-25 NOTE — PROGRESS NOTES
"CONSULTATION LIAISON PSYCHIATRY PROGRESS NOTE    Patient Name: Earl Abdul  MRN: [id number]  Patient Class: IP- Inpatient  Admission Date: 12/23/2022  Attending Physician: Elsa Camargo*      SUBJECTIVE:   Earl Abdul is a 62 y.o. female with past psychiatric history of EtOH Use Disorder & past pertinent medical history of T2DM, sarcoidosis, PRES, pancreatitis, migraines presents to the ED/ admitted to the hospital for Alcoholic ketoacidosis    Psychiatry consulted for EtOH withdrawal  Per chart review, pt required PRN Haldol 5 IM and IV at 2030 and 0333. Also received PRN ativan at 0854, 1708, 2307, and 0232 as well as Zyprexa 5 IM at 1708. Still requiring Valium 10 q 6 scheduled. Hypertensive and tachycardic this am.     Patient seen and chart reviewed. She is anxious, tremulous, irritable and restless today on initial interview. Sitting on the edge of her bed, upset about not being able to go home. States she misses her family and wants to be with them for gaby. Understanding of reason for continued hospitalization but stating she would like to get an Uber to come pick her up. Able to be redirected to return to her bed and calm down. When assessing if pt understood the risks/benefits of continued hospitalization, states "I'm not going to have a seizure if that is what you're worried about!" Talked with charge nurse about getting a sitter for the patient, states that the patient's  was on his way to assist with redirecting the patient.    Later seen on rounds, pt is resting calmly in bed. No distress noted, less tremulous. States her  just left and she is in better spirits. Discusses that she is still grieving the loss of her son due to an overdose, states this has contributed to her increase drinking during the holiday season. She is more agreeable to plan of care at this time and no longer wanting to leave AMA. Denies SI/HI.       OBJECTIVE:    Mental Status " Exam:  Appearance: malnourished, lying in bed  Behavior/Cooperation: Angry, wants to go home, restless  Speech: non-spontaneous  Mood: irritable  Affect: irritable and anxious   Thought Process: organized  Thought Content: No SI, HI  Orientation:  unable to assess  Memory: Unable to assess  Attention Span/Concentration: Decreased  Insight: poor  Judgment: poor    CAM ICU positive? Unable to assess    ASSESSMENT & RECOMMENDATIONS   Alcohol Use Disorder, Severe  Alcohol Withdrawal  Continue valium 10mg q6h  May give PRN haldol 5mg po/IM q6h for non-redirectable agitation; would avoid giving IV haldol given risk of QT prolongation.  Please do not give Zyprexa within 2 hours of any ativan dose due to risk of respiratory depression  Would continue to monitor EKG, especially if receiving increased PRN's; last Qtc 438ms on 12/23.  Continue PRN ativan for CIWA >8 or if 2 criteria are met: Systolic BP>160, Diastolic BP>100 or HR>100  Continue Vitamin supplementation - Thiamine 100 mg daily, Folate 1 mg daily, MVI daily, and Vitamin B12  Seizure precautions. Seizures generally occur between 12-48 hours after alcohol cessation. Monitor for hypoglycemia, hypocalcemia, and hypomagnesemia as these can predispose to seizure.  If patient asking to leave AMA, may assess for patient's ability to understand and verbalize the risks and benefits of leaving AMA (including seizure, falls, confusion, death) as well as staying in hospital (to complete taper safely) to assess for capacity to leave AMA.    RISK ASSESSMENT  NO NEED FOR PEC       FOLLOW UP  Will follow up while in house    DISPOSITION- Once medically cleared;   Defer to medical team    Please contact ON CALL psychiatry service (24/7) for any acute issues that may arise.    Dr. Valerie ALEJANDRO Psychiatry  [facility]  12/25/2022 5:56  PM        --------------------------------------------------------------------------------------------------------------------------------------------------------------------------------------------------------------------------------------    CONTINUED OBJECTIVE clinical data & findings reviewed and noted for above decision making    Current Medications:   Scheduled Meds:    diazePAM  10 mg Oral Q6H    folic acid  1 mg Oral Daily    levothyroxine  50 mcg Oral Before breakfast    multivitamin  1 tablet Oral Daily    pantoprazole  40 mg Oral BID AC    thiamine  100 mg Oral Daily     PRN Meds: dextrose 10%, dextrose 10%, glucagon (human recombinant), glucose, glucose, influenza, lorazepam, lorazepam, naloxone, ondansetron, sodium chloride 0.9%    Allergies:   Review of patient's allergies indicates:   Allergen Reactions    Lortab [hydrocodone-acetaminophen] Itching    Promethazine Itching and Other (See Comments)       Vitals  Vitals:    12/25/22 1309   BP: (!) 159/75   Pulse: 94   Resp: 18   Temp: 97.8 °F (36.6 °C)       Labs/Imaging/Studies:  Recent Results (from the past 24 hour(s))   Basic Metabolic Panel (BMP)    Collection Time: 12/24/22  6:57 PM   Result Value Ref Range    Sodium 138 136 - 145 mmol/L    Potassium 3.8 3.5 - 5.1 mmol/L    Chloride 104 95 - 110 mmol/L    CO2 21 (L) 23 - 29 mmol/L    Glucose 146 (H) 70 - 110 mg/dL    BUN 3 (L) 8 - 23 mg/dL    Creatinine 0.7 0.5 - 1.4 mg/dL    Calcium 8.6 (L) 8.7 - 10.5 mg/dL    Anion Gap 13 8 - 16 mmol/L    eGFR >60.0 >60 mL/min/1.73 m^2   POCT glucose    Collection Time: 12/24/22  8:42 PM   Result Value Ref Range    POCT Glucose 129 (H) 70 - 110 mg/dL   Basic Metabolic Panel (BMP)    Collection Time: 12/24/22  8:44 PM   Result Value Ref Range    Sodium 140 136 - 145 mmol/L    Potassium 3.8 3.5 - 5.1 mmol/L    Chloride 105 95 - 110 mmol/L    CO2 25 23 - 29 mmol/L    Glucose 127 (H) 70 - 110 mg/dL    BUN 3 (L) 8 - 23 mg/dL    Creatinine 0.8 0.5 - 1.4 mg/dL     Calcium 8.6 (L) 8.7 - 10.5 mg/dL    Anion Gap 10 8 - 16 mmol/L    eGFR >60.0 >60 mL/min/1.73 m^2   Basic Metabolic Panel (BMP)    Collection Time: 12/24/22 11:07 PM   Result Value Ref Range    Sodium 139 136 - 145 mmol/L    Potassium 3.6 3.5 - 5.1 mmol/L    Chloride 103 95 - 110 mmol/L    CO2 25 23 - 29 mmol/L    Glucose 149 (H) 70 - 110 mg/dL    BUN 3 (L) 8 - 23 mg/dL    Creatinine 0.8 0.5 - 1.4 mg/dL    Calcium 8.7 8.7 - 10.5 mg/dL    Anion Gap 11 8 - 16 mmol/L    eGFR >60.0 >60 mL/min/1.73 m^2   Basic Metabolic Panel (BMP)    Collection Time: 12/25/22  4:57 AM   Result Value Ref Range    Sodium 138 136 - 145 mmol/L    Potassium 3.4 (L) 3.5 - 5.1 mmol/L    Chloride 102 95 - 110 mmol/L    CO2 24 23 - 29 mmol/L    Glucose 140 (H) 70 - 110 mg/dL    BUN <3 (L) 8 - 23 mg/dL    Creatinine 0.8 0.5 - 1.4 mg/dL    Calcium 8.8 8.7 - 10.5 mg/dL    Anion Gap 12 8 - 16 mmol/L    eGFR >60.0 >60 mL/min/1.73 m^2   Magnesium    Collection Time: 12/25/22  4:57 AM   Result Value Ref Range    Magnesium 1.5 (L) 1.6 - 2.6 mg/dL   Phosphorus    Collection Time: 12/25/22  4:57 AM   Result Value Ref Range    Phosphorus <1.0 (LL) 2.7 - 4.5 mg/dL   POCT glucose    Collection Time: 12/25/22  8:38 AM   Result Value Ref Range    POCT Glucose 114 (H) 70 - 110 mg/dL   CBC auto differential    Collection Time: 12/25/22  9:15 AM   Result Value Ref Range    WBC 5.25 3.90 - 12.70 K/uL    RBC 3.59 (L) 4.00 - 5.40 M/uL    Hemoglobin 10.2 (L) 12.0 - 16.0 g/dL    Hematocrit 33.2 (L) 37.0 - 48.5 %    MCV 93 82 - 98 fL    MCH 28.4 27.0 - 31.0 pg    MCHC 30.7 (L) 32.0 - 36.0 g/dL    RDW 17.7 (H) 11.5 - 14.5 %    Platelets 47 (L) 150 - 450 K/uL    MPV 10.3 9.2 - 12.9 fL    Immature Granulocytes 0.6 (H) 0.0 - 0.5 %    Gran # (ANC) 1.6 (L) 1.8 - 7.7 K/uL    Immature Grans (Abs) 0.03 0.00 - 0.04 K/uL    Lymph # 3.2 1.0 - 4.8 K/uL    Mono # 0.3 0.3 - 1.0 K/uL    Eos # 0.0 0.0 - 0.5 K/uL    Baso # 0.01 0.00 - 0.20 K/uL    nRBC 0 0 /100 WBC    Gran % 31.2 (L)  38.0 - 73.0 %    Lymph % 61.5 (H) 18.0 - 48.0 %    Mono % 6.1 4.0 - 15.0 %    Eosinophil % 0.4 0.0 - 8.0 %    Basophil % 0.2 0.0 - 1.9 %    Differential Method Automated    Basic Metabolic Panel (BMP)    Collection Time: 12/25/22  9:15 AM   Result Value Ref Range    Sodium 141 136 - 145 mmol/L    Potassium 3.5 3.5 - 5.1 mmol/L    Chloride 102 95 - 110 mmol/L    CO2 27 23 - 29 mmol/L    Glucose 122 (H) 70 - 110 mg/dL    BUN <3 (L) 8 - 23 mg/dL    Creatinine 0.7 0.5 - 1.4 mg/dL    Calcium 9.0 8.7 - 10.5 mg/dL    Anion Gap 12 8 - 16 mmol/L    eGFR >60.0 >60 mL/min/1.73 m^2   Basic Metabolic Panel (BMP)    Collection Time: 12/25/22 11:09 AM   Result Value Ref Range    Sodium 138 136 - 145 mmol/L    Potassium 3.7 3.5 - 5.1 mmol/L    Chloride 100 95 - 110 mmol/L    CO2 29 23 - 29 mmol/L    Glucose 136 (H) 70 - 110 mg/dL    BUN <3 (L) 8 - 23 mg/dL    Creatinine 0.8 0.5 - 1.4 mg/dL    Calcium 9.3 8.7 - 10.5 mg/dL    Anion Gap 9 8 - 16 mmol/L    eGFR >60.0 >60 mL/min/1.73 m^2   POCT glucose    Collection Time: 12/25/22  1:11 PM   Result Value Ref Range    POCT Glucose 121 (H) 70 - 110 mg/dL     Imaging Results              US Liver with Doppler (xpd) (Final result)  Result time 12/23/22 10:49:34      Final result by Kevin Guerra MD (12/23/22 10:49:34)                   Impression:      Satisfactory Doppler evaluation of the liver.    Hepatosplenomegaly.    Stable splenic complex cystic lesion.    Electronically signed by resident: Alejandro Gardner  Date:    12/23/2022  Time:    09:55    Electronically signed by: Kevin Guerra MD  Date:    12/23/2022  Time:    10:49               Narrative:    EXAMINATION:  US LIVER WITH DOPPLER    CLINICAL HISTORY:  Alcohol abuse, here for ketoacidosis;    TECHNIQUE:  Complete abdominal ultrasound with doppler.  Color and spectral Doppler were performed.    COMPARISON:  CT 05/01/2022.    Ultrasound 04/17/2021.    FINDINGS:  Technically limited exam due to the patient breath-holding  technique.    The visualized portion of the pancreas is unremarkable.    The liver is enlarged in size measuring 20.5 cm.  The liver demonstrates diffusely heterogeneous echotexture.  No focal hepatic lesions are seen.    The gallbladder is unremarkable with no evidence of calculi.  The common duct is not dilated, measuring 2 mm.  The gallbladder wall is not thickened.  There is no sonographic Mccollum sign.  No dilated intrahepatic radicles are seen.    The spleen is enlarged in size measuring 13.5 x 4.4 cm with a homogeneous echotexture.  There is a stable complex cystic lesion in the spleen.    There is no evidence of ascites.    Borderline enlarged portal vein measuring 15 mm.  The main portal vein, right portal vein, left portal vein, middle hepatic vein, right hepatic vein, left hepatic vein, SMV, and IVC are patent with proper directional flow.  The main hepatic artery is patent. The umbilical vein is not patent.                                       X-Ray Chest AP Portable (Final result)  Result time 12/23/22 04:58:27      Final result by Aayush Hoffman MD (12/23/22 04:58:27)                   Impression:      No convincing radiographic evidence of acute intrathoracic process on this single view.      Electronically signed by: Aayush Hoffman MD  Date:    12/23/2022  Time:    04:58               Narrative:    EXAMINATION:  XR CHEST AP PORTABLE    CLINICAL HISTORY:  Hypoxemia    TECHNIQUE:  Single frontal view of the chest was performed.    COMPARISON:  Chest radiograph 09/26/2022, CT chest 03/25/2022    FINDINGS:  There is soft tissue attenuation relating to body habitus as well as bilateral breast prosthesis.  Cardiac silhouette is stable in size.  There is mild atherosclerotic calcification of the thoracic aorta.  Lung volumes are mildly diminished with bibasilar atelectasis.  No new large confluent airspace consolidation appreciated.  No significant volume of pleural fluid or pneumothorax appreciated.   Osseous structures demonstrate degenerative changes.

## 2022-12-26 LAB
ALBUMIN SERPL BCP-MCNC: 3.6 G/DL (ref 3.5–5.2)
ALP SERPL-CCNC: 80 U/L (ref 55–135)
ALT SERPL W/O P-5'-P-CCNC: 90 U/L (ref 10–44)
ANION GAP SERPL CALC-SCNC: 11 MMOL/L (ref 8–16)
ANION GAP SERPL CALC-SCNC: 12 MMOL/L (ref 8–16)
ANION GAP SERPL CALC-SCNC: 8 MMOL/L (ref 8–16)
ANION GAP SERPL CALC-SCNC: 8 MMOL/L (ref 8–16)
AST SERPL-CCNC: 137 U/L (ref 10–40)
BASOPHILS # BLD AUTO: 0.01 K/UL (ref 0–0.2)
BASOPHILS NFR BLD: 0.2 % (ref 0–1.9)
BILIRUB SERPL-MCNC: 1 MG/DL (ref 0.1–1)
BUN SERPL-MCNC: 3 MG/DL (ref 8–23)
BUN SERPL-MCNC: 3 MG/DL (ref 8–23)
BUN SERPL-MCNC: <3 MG/DL (ref 8–23)
CALCIUM SERPL-MCNC: 8.5 MG/DL (ref 8.7–10.5)
CALCIUM SERPL-MCNC: 8.5 MG/DL (ref 8.7–10.5)
CALCIUM SERPL-MCNC: 8.8 MG/DL (ref 8.7–10.5)
CALCIUM SERPL-MCNC: 8.9 MG/DL (ref 8.7–10.5)
CALCIUM SERPL-MCNC: 9.1 MG/DL (ref 8.7–10.5)
CALCIUM SERPL-MCNC: 9.6 MG/DL (ref 8.7–10.5)
CHLORIDE SERPL-SCNC: 100 MMOL/L (ref 95–110)
CHLORIDE SERPL-SCNC: 101 MMOL/L (ref 95–110)
CHLORIDE SERPL-SCNC: 101 MMOL/L (ref 95–110)
CHLORIDE SERPL-SCNC: 102 MMOL/L (ref 95–110)
CHLORIDE SERPL-SCNC: 103 MMOL/L (ref 95–110)
CHLORIDE SERPL-SCNC: 106 MMOL/L (ref 95–110)
CO2 SERPL-SCNC: 23 MMOL/L (ref 23–29)
CO2 SERPL-SCNC: 25 MMOL/L (ref 23–29)
CO2 SERPL-SCNC: 25 MMOL/L (ref 23–29)
CO2 SERPL-SCNC: 26 MMOL/L (ref 23–29)
CO2 SERPL-SCNC: 26 MMOL/L (ref 23–29)
CO2 SERPL-SCNC: 27 MMOL/L (ref 23–29)
CREAT SERPL-MCNC: 0.7 MG/DL (ref 0.5–1.4)
CREAT SERPL-MCNC: 0.7 MG/DL (ref 0.5–1.4)
CREAT SERPL-MCNC: 0.8 MG/DL (ref 0.5–1.4)
DIFFERENTIAL METHOD: ABNORMAL
EOSINOPHIL # BLD AUTO: 0.1 K/UL (ref 0–0.5)
EOSINOPHIL NFR BLD: 0.9 % (ref 0–8)
ERYTHROCYTE [DISTWIDTH] IN BLOOD BY AUTOMATED COUNT: 18.6 % (ref 11.5–14.5)
EST. GFR  (NO RACE VARIABLE): >60 ML/MIN/1.73 M^2
GLUCOSE SERPL-MCNC: 109 MG/DL (ref 70–110)
GLUCOSE SERPL-MCNC: 121 MG/DL (ref 70–110)
GLUCOSE SERPL-MCNC: 131 MG/DL (ref 70–110)
GLUCOSE SERPL-MCNC: 136 MG/DL (ref 70–110)
GLUCOSE SERPL-MCNC: 136 MG/DL (ref 70–110)
GLUCOSE SERPL-MCNC: 159 MG/DL (ref 70–110)
HCT VFR BLD AUTO: 34.6 % (ref 37–48.5)
HGB BLD-MCNC: 10.7 G/DL (ref 12–16)
IMM GRANULOCYTES # BLD AUTO: 0.01 K/UL (ref 0–0.04)
IMM GRANULOCYTES NFR BLD AUTO: 0.2 % (ref 0–0.5)
LYMPHOCYTES # BLD AUTO: 3.2 K/UL (ref 1–4.8)
LYMPHOCYTES NFR BLD: 60.9 % (ref 18–48)
MAGNESIUM SERPL-MCNC: 1.5 MG/DL (ref 1.6–2.6)
MCH RBC QN AUTO: 28.8 PG (ref 27–31)
MCHC RBC AUTO-ENTMCNC: 30.9 G/DL (ref 32–36)
MCV RBC AUTO: 93 FL (ref 82–98)
MONOCYTES # BLD AUTO: 0.4 K/UL (ref 0.3–1)
MONOCYTES NFR BLD: 7 % (ref 4–15)
NEUTROPHILS # BLD AUTO: 1.6 K/UL (ref 1.8–7.7)
NEUTROPHILS NFR BLD: 30.8 % (ref 38–73)
NRBC BLD-RTO: 0 /100 WBC
PHOSPHATE SERPL-MCNC: 1.5 MG/DL (ref 2.7–4.5)
PLATELET # BLD AUTO: 50 K/UL (ref 150–450)
PMV BLD AUTO: 11 FL (ref 9.2–12.9)
POCT GLUCOSE: 117 MG/DL (ref 70–110)
POTASSIUM SERPL-SCNC: 3.3 MMOL/L (ref 3.5–5.1)
POTASSIUM SERPL-SCNC: 3.3 MMOL/L (ref 3.5–5.1)
POTASSIUM SERPL-SCNC: 3.4 MMOL/L (ref 3.5–5.1)
POTASSIUM SERPL-SCNC: 3.5 MMOL/L (ref 3.5–5.1)
POTASSIUM SERPL-SCNC: 3.9 MMOL/L (ref 3.5–5.1)
POTASSIUM SERPL-SCNC: 4.2 MMOL/L (ref 3.5–5.1)
PROT SERPL-MCNC: 6.2 G/DL (ref 6–8.4)
RBC # BLD AUTO: 3.72 M/UL (ref 4–5.4)
SODIUM SERPL-SCNC: 134 MMOL/L (ref 136–145)
SODIUM SERPL-SCNC: 136 MMOL/L (ref 136–145)
SODIUM SERPL-SCNC: 137 MMOL/L (ref 136–145)
SODIUM SERPL-SCNC: 140 MMOL/L (ref 136–145)
SODIUM SERPL-SCNC: 140 MMOL/L (ref 136–145)
SODIUM SERPL-SCNC: 141 MMOL/L (ref 136–145)
WBC # BLD AUTO: 5.3 K/UL (ref 3.9–12.7)

## 2022-12-26 PROCEDURE — 25000003 PHARM REV CODE 250

## 2022-12-26 PROCEDURE — S4991 NICOTINE PATCH NONLEGEND: HCPCS

## 2022-12-26 PROCEDURE — 94761 N-INVAS EAR/PLS OXIMETRY MLT: CPT

## 2022-12-26 PROCEDURE — 20600001 HC STEP DOWN PRIVATE ROOM

## 2022-12-26 PROCEDURE — 63600175 PHARM REV CODE 636 W HCPCS

## 2022-12-26 PROCEDURE — 99232 SBSQ HOSP IP/OBS MODERATE 35: CPT | Mod: ,,, | Performed by: HOSPITALIST

## 2022-12-26 PROCEDURE — 80053 COMPREHEN METABOLIC PANEL: CPT

## 2022-12-26 PROCEDURE — 83735 ASSAY OF MAGNESIUM: CPT

## 2022-12-26 PROCEDURE — 84100 ASSAY OF PHOSPHORUS: CPT

## 2022-12-26 PROCEDURE — 80048 BASIC METABOLIC PNL TOTAL CA: CPT | Mod: 91,XB

## 2022-12-26 PROCEDURE — 85025 COMPLETE CBC W/AUTO DIFF WBC: CPT

## 2022-12-26 PROCEDURE — 99232 PR SUBSEQUENT HOSPITAL CARE,LEVL II: ICD-10-PCS | Mod: ,,, | Performed by: HOSPITALIST

## 2022-12-26 PROCEDURE — 36415 COLL VENOUS BLD VENIPUNCTURE: CPT

## 2022-12-26 RX ORDER — HALOPERIDOL 5 MG/ML
5 INJECTION INTRAMUSCULAR ONCE AS NEEDED
Status: COMPLETED | OUTPATIENT
Start: 2022-12-26 | End: 2022-12-26

## 2022-12-26 RX ORDER — IBUPROFEN 200 MG
1 TABLET ORAL DAILY
Status: DISCONTINUED | OUTPATIENT
Start: 2022-12-26 | End: 2022-12-28 | Stop reason: HOSPADM

## 2022-12-26 RX ORDER — POTASSIUM CHLORIDE 20 MEQ/1
40 TABLET, EXTENDED RELEASE ORAL 2 TIMES DAILY
Status: COMPLETED | OUTPATIENT
Start: 2022-12-26 | End: 2022-12-26

## 2022-12-26 RX ORDER — ONDANSETRON 4 MG/1
4 TABLET, ORALLY DISINTEGRATING ORAL ONCE
Status: COMPLETED | OUTPATIENT
Start: 2022-12-26 | End: 2022-12-26

## 2022-12-26 RX ADMIN — PANTOPRAZOLE SODIUM 40 MG: 40 TABLET, DELAYED RELEASE ORAL at 06:12

## 2022-12-26 RX ADMIN — THERA TABS 1 TABLET: TAB at 07:12

## 2022-12-26 RX ADMIN — ONDANSETRON 4 MG: 4 TABLET, ORALLY DISINTEGRATING ORAL at 09:12

## 2022-12-26 RX ADMIN — POTASSIUM CHLORIDE 40 MEQ: 1500 TABLET, EXTENDED RELEASE ORAL at 09:12

## 2022-12-26 RX ADMIN — LORAZEPAM 2 MG: 2 INJECTION INTRAMUSCULAR at 07:12

## 2022-12-26 RX ADMIN — HALOPERIDOL LACTATE 5 MG: 5 INJECTION, SOLUTION INTRAMUSCULAR at 07:12

## 2022-12-26 RX ADMIN — DIAZEPAM 10 MG: 5 TABLET ORAL at 06:12

## 2022-12-26 RX ADMIN — LORAZEPAM 2 MG: 2 INJECTION INTRAMUSCULAR at 12:12

## 2022-12-26 RX ADMIN — FOLIC ACID 1 MG: 1 TABLET ORAL at 07:12

## 2022-12-26 RX ADMIN — THIAMINE HCL TAB 100 MG 100 MG: 100 TAB at 07:12

## 2022-12-26 RX ADMIN — PANTOPRAZOLE SODIUM 40 MG: 40 TABLET, DELAYED RELEASE ORAL at 04:12

## 2022-12-26 RX ADMIN — POTASSIUM CHLORIDE 40 MEQ: 1500 TABLET, EXTENDED RELEASE ORAL at 10:12

## 2022-12-26 RX ADMIN — DIAZEPAM 10 MG: 5 TABLET ORAL at 11:12

## 2022-12-26 RX ADMIN — DIAZEPAM 10 MG: 5 TABLET ORAL at 10:12

## 2022-12-26 RX ADMIN — LORAZEPAM 2 MG: 2 INJECTION INTRAMUSCULAR; INTRAVENOUS at 07:12

## 2022-12-26 RX ADMIN — LORAZEPAM 2 MG: 2 INJECTION INTRAMUSCULAR; INTRAVENOUS at 12:12

## 2022-12-26 RX ADMIN — Medication 1 PATCH: at 10:12

## 2022-12-26 RX ADMIN — LEVOTHYROXINE SODIUM 50 MCG: 50 TABLET ORAL at 06:12

## 2022-12-26 NOTE — NURSING
Pt accidentally pulled IV out, and is too agitated to attempt a new IV placement at this time. MD aware of no access.

## 2022-12-26 NOTE — SUBJECTIVE & OBJECTIVE
Interval History: PT PEC'ed overnight 2/2 attempting to leave AMA. Currently receiving high doses of benzodiazepines and Haldol prn for agitation. Will f/u with psych, but pt will likely need inpatient med/psych facility considering pt has failed rehab and home benzo taper/alcohol cessation in the past. Pt sedated on encounter, but still complains of tremors and anxiety.    Review of Systems   Constitutional:  Positive for appetite change. Negative for chills and fever.   HENT:  Negative for ear pain and sinus pain.    Eyes:  Negative for pain and visual disturbance.   Respiratory:  Negative for chest tightness and shortness of breath.    Cardiovascular:  Negative for leg swelling.   Gastrointestinal:  Negative for abdominal pain, constipation, diarrhea, nausea and vomiting.   Genitourinary:  Negative for dysuria.   Musculoskeletal:  Positive for gait problem.   Neurological:  Positive for tremors, weakness and headaches.   Psychiatric/Behavioral:  Positive for agitation and sleep disturbance. The patient is nervous/anxious.    Objective:     Vital Signs (Most Recent):  Temp: 98.5 °F (36.9 °C) (12/26/22 1234)  Pulse: 110 (12/26/22 1234)  Resp: 18 (12/26/22 1234)  BP: 134/65 (12/26/22 1234)  SpO2: (!) 90 % (12/26/22 1234)   Vital Signs (24h Range):  Temp:  [98.2 °F (36.8 °C)-98.7 °F (37.1 °C)] 98.5 °F (36.9 °C)  Pulse:  [] 110  Resp:  [16-18] 18  SpO2:  [88 %-94 %] 90 %  BP: (134-167)/(60-82) 134/65     Weight: 79.5 kg (175 lb 4.3 oz)  Body mass index is 28.29 kg/m².  No intake or output data in the 24 hours ending 12/26/22 1641   Physical Exam  Constitutional:       General: She is not in acute distress.     Appearance: Normal appearance. She is ill-appearing. She is not toxic-appearing or diaphoretic.   HENT:      Head: Normocephalic and atraumatic.      Nose: Nose normal.      Mouth/Throat:      Mouth: Mucous membranes are moist.      Pharynx: Oropharynx is clear.   Eyes:      Extraocular Movements:  Extraocular movements intact.   Cardiovascular:      Rate and Rhythm: Regular rhythm. Tachycardia present.   Pulmonary:      Effort: No respiratory distress.      Breath sounds: No wheezing.   Abdominal:      General: Abdomen is flat. There is no distension.      Tenderness: There is no abdominal tenderness. There is no guarding.   Musculoskeletal:         General: No swelling or tenderness.      Right lower leg: No edema.      Left lower leg: No edema.   Skin:     Coloration: Skin is not jaundiced.      Findings: Bruising (Abdomen, nontender) present.   Neurological:      General: No focal deficit present.      Mental Status: She is alert and oriented to person, place, and time.      Motor: Tremor present.   Psychiatric:         Attention and Perception: She is attentive.         Mood and Affect: Mood is anxious and depressed.         Speech: Speech is slurred.         Behavior: Behavior is agitated and hyperactive.       Significant Labs: All pertinent labs within the past 24 hours have been reviewed.  CBC:   Recent Labs   Lab 12/25/22  0915 12/26/22  0600   WBC 5.25 5.30   HGB 10.2* 10.7*   HCT 33.2* 34.6*   PLT 47* 50*     CMP:   Recent Labs   Lab 12/26/22  0600 12/26/22  0807 12/26/22  1049   * 137 136   K 3.3* 3.3* 3.5    100 101   CO2 25 25 27   * 109 159*   BUN <3* <3* <3*   CREATININE 0.8 0.7 0.8   CALCIUM 8.5* 8.5* 8.9   PROT  --   --  6.2   ALBUMIN  --   --  3.6   BILITOT  --   --  1.0   ALKPHOS  --   --  80   AST  --   --  137*   ALT  --   --  90*   ANIONGAP 8 12 8       Significant Imaging: I have reviewed all pertinent imaging results/findings within the past 24 hours.

## 2022-12-26 NOTE — NURSING
Pt is very agitated, attempted to break rawls window with trash can, and kept yelling and banging on the window, requesting for us to let her out to smoke a cigarette. Attempted to redirect pt and explain why she cant leave at this time. Called  to try to calm patient down, but was unsuccessful. Notified MD and mentioned husbands suggestion of pt's home dose of trazodone, which seemed to calm her down for the time being.

## 2022-12-26 NOTE — ASSESSMENT & PLAN NOTE
62 year old with singiifcant PMHx of alocohl abuse, with multiple hospitalizations and rehab stays presents with nausea, bloody vomiting, tremors, and malaise for last several days. She also endorses not eating for at least a week. Glucose 50s upon admission, tremors noted. Patient awake, alert, and oriented. Given IV thiamine and started on D5NS in ED.   - Liver U/S ordered, satisfactory doppler eval  - Will continue D5NS for now   - Standing valium and prns for withdrawals along with q4 CIWA checks   - POCT glucose switched to 4x daily, sugars runs >140s <190s last 10x checks

## 2022-12-26 NOTE — NURSING
"Pt is very agitated again, banging on the rawls window, yelling to let her out to have a cigarette across the street. Pt refuses nicotine patch saying "it makes me sick" and that all she wants is a cigarette. Notified MD and got approval to administer PRN ativan and scheduled valium 1 hour early to help pt calm down.  "

## 2022-12-26 NOTE — ASSESSMENT & PLAN NOTE
"Patient with significant hx of alcohol abuse requiring multiple hospitalizations as well as several rehab admissions per  presetns after 1x week of no eating and binge drinking a 5th of vodka per day. Patient is AAOx3 but tremulous. Last drink was 6-8 hours prior.      Plan    - CIWA driven as below   - Thiamine IV given in  - Monitor daily CMP and closely monitor electrolytes  - Seizure precautions and CIWA q4 ordered    - PO Valium standing 10mg TID for now, increased to q6 per psych recs    - PRN ativan for breakthrough symptoms tremors noted thus far - needed overnight   - Haldol given overnigt due to substantially increased agitation, psych recommends oral or IM administration if needed  - Per psych, will use IM/PO haldol for increased agitation (and avoid zyprexa)  - Episodes of increased agitation, requiring haldol and Zyprexa PRN support, along with her max dose of standing valium. Patient is redirectable at one point stating "she's very sick." Multiple requests however to be discharged but has been unable to voice understanding of her current medical condition or the consequences if she chose to stop treatment altogether. Patient also found to slur.   - Will follow up with psych regarding PEC as pt would likely benefit from inpatient med-psych placement as opposed to dc home with benzo taper and rehab placement (many failed attempts at sobriety in past)  - Will continue PEC for now and readdress concerns with psych in AM            Ciwa driven     CIWA,   Frequency          Medication    D            ose    < 8      Q4H x 6 - If CIWA-AR score < 8, stop None  8?14    Q2H           Lorazepam  1 mg  15?20  Q1H           Lorazepam 2 mg  21?30  Q1H           Lorazepam 3 mg  31?45  Q1H           Lorazepam4 mg  [id number] Minutes          Lorazepam 2 mg      "

## 2022-12-26 NOTE — PROGRESS NOTES
[facility] - Telemetry [facility] Medicine  Progress Note    Patient Name: Earl Abdul  MRN: [id number]  Patient Class: IP- Inpatient   Admission Date: 12/23/2022  Length of Stay: 3 days  Attending Physician: Elsa Camargo*  Primary Care Provider: Andrew Rodriguez MD        Subjective:     Principal Problem:Alcoholic ketoacidosis        HPI:  Jason a 62-year-old female with a significant past medical history of alcohol use disorder with multiple hospitalizations and rehab attempts, CLL, breast cancer, T2DM, COPD, and suicide attempt presenting to the emergency department after roughly 4 days of persistent nausea and emesis.  She states she is been binge drinking for roughly 7 days, with about 1 bottle of vodka per day.  She also states due to her depression she is not been eating for a week as well.  Last drink was roughly 6-7 hours upon time of evaluation.  For her  she was recently in a rehab facility roughly 2 weeks ago.  She does have history of delirium tremens, and seizures due to alcohol withdrawals.  She denies any hallucinations, or seizure-like activity at this time.  She is however tremulous.  She denies any fevers, chills ,shortness of breath, or sweats. She further denies any abdominal tenderness. Endorses 1 time episode of bloody emesis which has not returned.  She states she stopped taking her regular medications roughly 5 days ago.    In the ED: Glucose was found to be in the 50s, She was given IV thiamine followed by D5 normal saline.  She was also given IV Ativan.        Overview/Hospital Course:  Admitted for alcohol withdrawals with prior hx of seizures as a result. Started on 10mg valium q8 increased to q6 per psychiatry recommendations. PRN ativan as needed for further withdrawals CIWA >8. Overnight patient became very agitated, threatening to rip out IV lines and walk out the hospital. Patient was given IV haldol by team which helped with the agitation.  at  "bedside along with primary team. Patient expressed desire to go home to see grandchildren however is very noticeably tremulous  during this meeting.  expresses safety concerns as well. Psych aware, and will give further recs. In evening, patient found to be increasingly agitated, pulling IV lines, disabling her bed alarm, getting out of bed and threatening to leave AMA. Given PRN ativan and Zyprexa. She had inconsistent train of thought, slurring her speech. She was further unable to demonstrate insight into her medical condition as well as what may happen if she were to leave. In the following morning patient as found wondering the halls, asking to find the vending machine. She was redirected back to her room. When primary met with her in am she stated herself she feels "too sick" which is far removed from her previous request to go home. Per psych recs, will avoid Zyprexa, and administer haldol IM or PO. Pt still continuing to need high doses of benzodiazepines, and will likely need inpatient med/psych placement for benzo taper and alcohol use disorder treatment.      Interval History: PT PEC'ed overnight 2/2 attempting to leave AMA. Currently receiving high doses of benzodiazepines and Haldol prn for agitation. Will f/u with psych, but pt will likely need inpatient med/psych facility considering pt has failed rehab and home benzo taper/alcohol cessation in the past. Pt sedated on encounter, but still complains of tremors and anxiety.    Review of Systems   Constitutional:  Positive for appetite change. Negative for chills and fever.   HENT:  Negative for ear pain and sinus pain.    Eyes:  Negative for pain and visual disturbance.   Respiratory:  Negative for chest tightness and shortness of breath.    Cardiovascular:  Negative for leg swelling.   Gastrointestinal:  Negative for abdominal pain, constipation, diarrhea, nausea and vomiting.   Genitourinary:  Negative for dysuria.   Musculoskeletal:  Positive " for gait problem.   Neurological:  Positive for tremors, weakness and headaches.   Psychiatric/Behavioral:  Positive for agitation and sleep disturbance. The patient is nervous/anxious.    Objective:     Vital Signs (Most Recent):  Temp: 98.5 °F (36.9 °C) (12/26/22 1234)  Pulse: 110 (12/26/22 1234)  Resp: 18 (12/26/22 1234)  BP: 134/65 (12/26/22 1234)  SpO2: (!) 90 % (12/26/22 1234)   Vital Signs (24h Range):  Temp:  [98.2 °F (36.8 °C)-98.7 °F (37.1 °C)] 98.5 °F (36.9 °C)  Pulse:  [] 110  Resp:  [16-18] 18  SpO2:  [88 %-94 %] 90 %  BP: (134-167)/(60-82) 134/65     Weight: 79.5 kg (175 lb 4.3 oz)  Body mass index is 28.29 kg/m².  No intake or output data in the 24 hours ending 12/26/22 1641   Physical Exam  Constitutional:       General: She is not in acute distress.     Appearance: Normal appearance. She is ill-appearing. She is not toxic-appearing or diaphoretic.   HENT:      Head: Normocephalic and atraumatic.      Nose: Nose normal.      Mouth/Throat:      Mouth: Mucous membranes are moist.      Pharynx: Oropharynx is clear.   Eyes:      Extraocular Movements: Extraocular movements intact.   Cardiovascular:      Rate and Rhythm: Regular rhythm. Tachycardia present.   Pulmonary:      Effort: No respiratory distress.      Breath sounds: No wheezing.   Abdominal:      General: Abdomen is flat. There is no distension.      Tenderness: There is no abdominal tenderness. There is no guarding.   Musculoskeletal:         General: No swelling or tenderness.      Right lower leg: No edema.      Left lower leg: No edema.   Skin:     Coloration: Skin is not jaundiced.      Findings: Bruising (Abdomen, nontender) present.   Neurological:      General: No focal deficit present.      Mental Status: She is alert and oriented to person, place, and time.      Motor: Tremor present.   Psychiatric:         Attention and Perception: She is attentive.         Mood and Affect: Mood is anxious and depressed.         Speech: Speech  is slurred.         Behavior: Behavior is agitated and hyperactive.       Significant Labs: All pertinent labs within the past 24 hours have been reviewed.  CBC:   Recent Labs   Lab 12/25/22  0915 12/26/22  0600   WBC 5.25 5.30   HGB 10.2* 10.7*   HCT 33.2* 34.6*   PLT 47* 50*     CMP:   Recent Labs   Lab 12/26/22  0600 12/26/22  0807 12/26/22  1049   * 137 136   K 3.3* 3.3* 3.5    100 101   CO2 25 25 27   * 109 159*   BUN <3* <3* <3*   CREATININE 0.8 0.7 0.8   CALCIUM 8.5* 8.5* 8.9   PROT  --   --  6.2   ALBUMIN  --   --  3.6   BILITOT  --   --  1.0   ALKPHOS  --   --  80   AST  --   --  137*   ALT  --   --  90*   ANIONGAP 8 12 8       Significant Imaging: I have reviewed all pertinent imaging results/findings within the past 24 hours.      Assessment/Plan:      * Alcoholic ketoacidosis  62 year old with singiifcant PMHx of alocohl abuse, with multiple hospitalizations and rehab stays presents with nausea, bloody vomiting, tremors, and malaise for last several days. She also endorses not eating for at least a week. Glucose 50s upon admission, tremors noted. Patient awake, alert, and oriented. Given IV thiamine and started on D5NS in ED.   - Liver U/S ordered, satisfactory doppler eval  - Will continue D5NS for now   - Standing valium and prns for withdrawals along with q4 CIWA checks   - POCT glucose switched to 4x daily, sugars runs >140s <190s last 10x checks         Hypothyroidism  Cont home synthroid       Malignant neoplasm of central portion of right breast in female, estrogen receptor positive  Med rec notable for aripiprazole taken once daily. Further hx of CLL/CMBL, WBC currently 32 with no indication of infectious process currently   - Will consult heme/onc, appreciate recs        Hyperlipidemia  Holding statin in setting of elevated LFTs       Bloody emesis  Patient endorsed 1x lepidote of nausea and blooding vomiting. Noted dried red stains on blanket and patient clothes. States has  "never happened before and she has no hx that would allude to this. Hgb upon admission stable at 12   - Daily CBCs   - EGD from April 2021 was noncontributory  - prontonix 80mg IV followed by 40mg q12   - NPO except for medication administration   - GI consulted, appreciate recs       Alcohol use disorder, severe, dependence  Patient with significant hx of alcohol abuse requiring multiple hospitalizations as well as several rehab admissions per  presetns after 1x week of no eating and binge drinking a 5th of vodka per day. Patient is AAOx3 but tremulous. Last drink was 6-8 hours prior.      Plan    - CIWA driven as below   - Thiamine IV given in  - Monitor daily CMP and closely monitor electrolytes  - Seizure precautions and CIWA q4 ordered    - PO Valium standing 10mg TID for now, increased to q6 per psych recs    - PRN ativan for breakthrough symptoms tremors noted thus far - needed overnight   - Haldol given overnigt due to substantially increased agitation, psych recommends oral or IM administration if needed  - Per psych, will use IM/PO haldol for increased agitation (and avoid zyprexa)  - Episodes of increased agitation, requiring haldol and Zyprexa PRN support, along with her max dose of standing valium. Patient is redirectable at one point stating "she's very sick." Multiple requests however to be discharged but has been unable to voice understanding of her current medical condition or the consequences if she chose to stop treatment altogether. Patient also found to slur.   - Will follow up with psych regarding PEC as pt would likely benefit from inpatient med-psych placement as opposed to dc home with benzo taper and rehab placement (many failed attempts at sobriety in past)  - Will continue PEC for now and readdress concerns with psych in AM            Ciwa driven     CIWA,   Frequency          Medication    D            ose    < 8      Q4H x 6 - If CIWA-AR score < 8, stop None  8?14    Q2H           " Lorazepam  1 mg  15?20  Q1H           Lorazepam 2 mg  21?30  Q1H           Lorazepam 3 mg  31?45  Q1H           Lorazepam4 mg  [id number] Minutes          Lorazepam 2 mg        Anxiety and Depression  Patient takes Abilify and Cymbalta  - Will hold pending eval from psychiatry/addiction         VTE Risk Mitigation (From admission, onward)         Ordered     IP VTE HIGH RISK PATIENT  Once         12/23/22 0651     Place sequential compression device  Until discontinued         12/23/22 0651                Discharge Planning   BECCA: 12/28/2022     Code Status: Full Code   Is the patient medically ready for discharge?: No    Reason for patient still in hospital (select all that apply): Treatment and Pending disposition                     Fredrick Frederick MD  Department of Hospital Medicine   [facility] - Telemetry Stepdown

## 2022-12-26 NOTE — ASSESSMENT & PLAN NOTE
Med rec notable for aripiprazole taken once daily. Further hx of CLL/CMBL, WBC currently 32 with no indication of infectious process currently   - Will consult heme/onc, appreciate recs

## 2022-12-27 LAB
ANION GAP SERPL CALC-SCNC: 10 MMOL/L (ref 8–16)
ANION GAP SERPL CALC-SCNC: 12 MMOL/L (ref 8–16)
ANION GAP SERPL CALC-SCNC: 9 MMOL/L (ref 8–16)
BASOPHILS # BLD AUTO: 0.01 K/UL (ref 0–0.2)
BASOPHILS NFR BLD: 0.2 % (ref 0–1.9)
BUN SERPL-MCNC: 3 MG/DL (ref 8–23)
BUN SERPL-MCNC: 3 MG/DL (ref 8–23)
BUN SERPL-MCNC: 4 MG/DL (ref 8–23)
CALCIUM SERPL-MCNC: 9 MG/DL (ref 8.7–10.5)
CALCIUM SERPL-MCNC: 9.2 MG/DL (ref 8.7–10.5)
CALCIUM SERPL-MCNC: 9.3 MG/DL (ref 8.7–10.5)
CHLORIDE SERPL-SCNC: 106 MMOL/L (ref 95–110)
CHLORIDE SERPL-SCNC: 107 MMOL/L (ref 95–110)
CHLORIDE SERPL-SCNC: 107 MMOL/L (ref 95–110)
CO2 SERPL-SCNC: 22 MMOL/L (ref 23–29)
CO2 SERPL-SCNC: 24 MMOL/L (ref 23–29)
CO2 SERPL-SCNC: 24 MMOL/L (ref 23–29)
CREAT SERPL-MCNC: 0.8 MG/DL (ref 0.5–1.4)
DIFFERENTIAL METHOD: ABNORMAL
EOSINOPHIL # BLD AUTO: 0.1 K/UL (ref 0–0.5)
EOSINOPHIL NFR BLD: 1 % (ref 0–8)
ERYTHROCYTE [DISTWIDTH] IN BLOOD BY AUTOMATED COUNT: 19.1 % (ref 11.5–14.5)
EST. GFR  (NO RACE VARIABLE): >60 ML/MIN/1.73 M^2
GLUCOSE SERPL-MCNC: 111 MG/DL (ref 70–110)
GLUCOSE SERPL-MCNC: 129 MG/DL (ref 70–110)
GLUCOSE SERPL-MCNC: 172 MG/DL (ref 70–110)
HCT VFR BLD AUTO: 36 % (ref 37–48.5)
HGB BLD-MCNC: 10.6 G/DL (ref 12–16)
IMM GRANULOCYTES # BLD AUTO: 0.01 K/UL (ref 0–0.04)
IMM GRANULOCYTES NFR BLD AUTO: 0.2 % (ref 0–0.5)
LYMPHOCYTES # BLD AUTO: 3.6 K/UL (ref 1–4.8)
LYMPHOCYTES NFR BLD: 61.3 % (ref 18–48)
MAGNESIUM SERPL-MCNC: 1.5 MG/DL (ref 1.6–2.6)
MCH RBC QN AUTO: 28.1 PG (ref 27–31)
MCHC RBC AUTO-ENTMCNC: 29.4 G/DL (ref 32–36)
MCV RBC AUTO: 96 FL (ref 82–98)
MONOCYTES # BLD AUTO: 0.5 K/UL (ref 0.3–1)
MONOCYTES NFR BLD: 9.2 % (ref 4–15)
NEUTROPHILS # BLD AUTO: 1.6 K/UL (ref 1.8–7.7)
NEUTROPHILS NFR BLD: 28.1 % (ref 38–73)
NRBC BLD-RTO: 0 /100 WBC
PHOSPHATE SERPL-MCNC: 2.3 MG/DL (ref 2.7–4.5)
PLATELET # BLD AUTO: 58 K/UL (ref 150–450)
PMV BLD AUTO: 11.2 FL (ref 9.2–12.9)
POCT GLUCOSE: 117 MG/DL (ref 70–110)
POCT GLUCOSE: 119 MG/DL (ref 70–110)
POCT GLUCOSE: 131 MG/DL (ref 70–110)
POCT GLUCOSE: 149 MG/DL (ref 70–110)
POTASSIUM SERPL-SCNC: 4 MMOL/L (ref 3.5–5.1)
POTASSIUM SERPL-SCNC: 4.3 MMOL/L (ref 3.5–5.1)
POTASSIUM SERPL-SCNC: 4.4 MMOL/L (ref 3.5–5.1)
RBC # BLD AUTO: 3.77 M/UL (ref 4–5.4)
SODIUM SERPL-SCNC: 139 MMOL/L (ref 136–145)
SODIUM SERPL-SCNC: 141 MMOL/L (ref 136–145)
SODIUM SERPL-SCNC: 141 MMOL/L (ref 136–145)
WBC # BLD AUTO: 5.79 K/UL (ref 3.9–12.7)

## 2022-12-27 PROCEDURE — S4991 NICOTINE PATCH NONLEGEND: HCPCS

## 2022-12-27 PROCEDURE — 85025 COMPLETE CBC W/AUTO DIFF WBC: CPT

## 2022-12-27 PROCEDURE — 20600001 HC STEP DOWN PRIVATE ROOM

## 2022-12-27 PROCEDURE — 36415 COLL VENOUS BLD VENIPUNCTURE: CPT

## 2022-12-27 PROCEDURE — 80048 BASIC METABOLIC PNL TOTAL CA: CPT

## 2022-12-27 PROCEDURE — 94761 N-INVAS EAR/PLS OXIMETRY MLT: CPT

## 2022-12-27 PROCEDURE — 63600175 PHARM REV CODE 636 W HCPCS

## 2022-12-27 PROCEDURE — 25000003 PHARM REV CODE 250

## 2022-12-27 PROCEDURE — 83735 ASSAY OF MAGNESIUM: CPT

## 2022-12-27 PROCEDURE — 99232 PR SUBSEQUENT HOSPITAL CARE,LEVL II: ICD-10-PCS | Mod: ,,, | Performed by: HOSPITALIST

## 2022-12-27 PROCEDURE — 99232 SBSQ HOSP IP/OBS MODERATE 35: CPT | Mod: ,,, | Performed by: HOSPITALIST

## 2022-12-27 PROCEDURE — 99232 SBSQ HOSP IP/OBS MODERATE 35: CPT | Mod: ,,, | Performed by: PSYCHIATRY & NEUROLOGY

## 2022-12-27 PROCEDURE — 99232 PR SUBSEQUENT HOSPITAL CARE,LEVL II: ICD-10-PCS | Mod: ,,, | Performed by: PSYCHIATRY & NEUROLOGY

## 2022-12-27 PROCEDURE — 84100 ASSAY OF PHOSPHORUS: CPT

## 2022-12-27 RX ORDER — SODIUM,POTASSIUM PHOSPHATES 280-250MG
2 POWDER IN PACKET (EA) ORAL 2 TIMES DAILY
Status: COMPLETED | OUTPATIENT
Start: 2022-12-27 | End: 2022-12-27

## 2022-12-27 RX ORDER — ARIPIPRAZOLE 5 MG/1
5 TABLET ORAL DAILY
Status: DISCONTINUED | OUTPATIENT
Start: 2022-12-27 | End: 2022-12-28 | Stop reason: HOSPADM

## 2022-12-27 RX ORDER — MAGNESIUM SULFATE HEPTAHYDRATE 40 MG/ML
2 INJECTION, SOLUTION INTRAVENOUS
Status: DISCONTINUED | OUTPATIENT
Start: 2022-12-27 | End: 2022-12-27

## 2022-12-27 RX ORDER — DULOXETIN HYDROCHLORIDE 60 MG/1
60 CAPSULE, DELAYED RELEASE ORAL DAILY
Status: DISCONTINUED | OUTPATIENT
Start: 2022-12-27 | End: 2022-12-28 | Stop reason: HOSPADM

## 2022-12-27 RX ORDER — TRAZODONE HYDROCHLORIDE 100 MG/1
200 TABLET ORAL NIGHTLY
Status: DISCONTINUED | OUTPATIENT
Start: 2022-12-27 | End: 2022-12-28 | Stop reason: HOSPADM

## 2022-12-27 RX ORDER — ENOXAPARIN SODIUM 100 MG/ML
40 INJECTION SUBCUTANEOUS EVERY 24 HOURS
Status: DISCONTINUED | OUTPATIENT
Start: 2022-12-27 | End: 2022-12-28 | Stop reason: HOSPADM

## 2022-12-27 RX ORDER — LANOLIN ALCOHOL/MO/W.PET/CERES
400 CREAM (GRAM) TOPICAL 2 TIMES DAILY
Status: COMPLETED | OUTPATIENT
Start: 2022-12-27 | End: 2022-12-27

## 2022-12-27 RX ORDER — DIAZEPAM 5 MG/1
10 TABLET ORAL EVERY 8 HOURS
Status: DISCONTINUED | OUTPATIENT
Start: 2022-12-27 | End: 2022-12-28 | Stop reason: HOSPADM

## 2022-12-27 RX ADMIN — ARIPIPRAZOLE 5 MG: 5 TABLET ORAL at 04:12

## 2022-12-27 RX ADMIN — LEVOTHYROXINE SODIUM 50 MCG: 50 TABLET ORAL at 06:12

## 2022-12-27 RX ADMIN — DIAZEPAM 10 MG: 5 TABLET ORAL at 09:12

## 2022-12-27 RX ADMIN — THERA TABS 1 TABLET: TAB at 09:12

## 2022-12-27 RX ADMIN — Medication 400 MG: at 02:12

## 2022-12-27 RX ADMIN — POTASSIUM & SODIUM PHOSPHATES POWDER PACK 280-160-250 MG 2 PACKET: 280-160-250 PACK at 09:12

## 2022-12-27 RX ADMIN — FOLIC ACID 1 MG: 1 TABLET ORAL at 09:12

## 2022-12-27 RX ADMIN — LORAZEPAM 2 MG: 2 INJECTION INTRAMUSCULAR; INTRAVENOUS at 06:12

## 2022-12-27 RX ADMIN — DULOXETINE 60 MG: 60 CAPSULE, DELAYED RELEASE ORAL at 04:12

## 2022-12-27 RX ADMIN — Medication 400 MG: at 09:12

## 2022-12-27 RX ADMIN — Medication 1 PATCH: at 09:12

## 2022-12-27 RX ADMIN — TRAZODONE HYDROCHLORIDE 200 MG: 100 TABLET ORAL at 09:12

## 2022-12-27 RX ADMIN — LORAZEPAM 2 MG: 2 INJECTION INTRAMUSCULAR; INTRAVENOUS at 11:12

## 2022-12-27 RX ADMIN — DIAZEPAM 10 MG: 5 TABLET ORAL at 06:12

## 2022-12-27 RX ADMIN — PANTOPRAZOLE SODIUM 40 MG: 40 TABLET, DELAYED RELEASE ORAL at 06:12

## 2022-12-27 RX ADMIN — THIAMINE HCL TAB 100 MG 100 MG: 100 TAB at 09:12

## 2022-12-27 RX ADMIN — DIAZEPAM 10 MG: 5 TABLET ORAL at 02:12

## 2022-12-27 RX ADMIN — LORAZEPAM 2 MG: 2 INJECTION INTRAMUSCULAR; INTRAVENOUS at 01:12

## 2022-12-27 RX ADMIN — PANTOPRAZOLE SODIUM 40 MG: 40 TABLET, DELAYED RELEASE ORAL at 02:12

## 2022-12-27 NOTE — ASSESSMENT & PLAN NOTE
"Patient with significant hx of alcohol abuse requiring multiple hospitalizations as well as several rehab admissions per  presetns after 1x week of no eating and binge drinking a 5th of vodka per day. Patient is AAOx3 but tremulous. Last drink was 6-8 hours prior.      Plan    - CIWA driven as below   - Thiamine IV given in  - Monitor daily CMP and closely monitor electrolytes  - Seizure precautions and CIWA q4 ordered    - PO Valium standing 10mg TID for now, increased to q6 per psych recs    - PRN ativan for breakthrough symptoms tremors noted thus far - needed overnight   - Haldol given overnigt due to substantially increased agitation, psych recommends oral or IM administration if needed  - Per psych, will use IM/PO haldol for increased agitation (and avoid zyprexa)  - Episodes of increased agitation, requiring haldol and Zyprexa PRN support, along with her max dose of standing valium. Patient is redirectable at one point stating "she's very sick." Multiple requests however to be discharged but has been unable to voice understanding of her current medical condition or the consequences if she chose to stop treatment altogether. Patient also found to slur.   - Will continue PEC while inpatient; however, pt will not need inpatient psych on dc. PEC continued as precaution for pt who has attempted leaving AMA multiple times and lacks insight into her disease.  - Valium taper decreased to 10mg Q8            Ciwa driven     CIWA,   Frequency          Medication    D            ose    < 8      Q4H x 6 - If CIWA-AR score < 8, stop None  8?14    Q2H           Lorazepam  1 mg  15?20  Q1H           Lorazepam 2 mg  21?30  Q1H           Lorazepam 3 mg  31?45  Q1H           Lorazepam4 mg  [id number] Minutes          Lorazepam 2 mg      "

## 2022-12-27 NOTE — PLAN OF CARE
[facility] - Telemetry Stepdown  Initial Discharge Assessment       Primary Care Provider: Andrew Rodriguez MD    Admission Diagnosis: Alcoholic ketoacidosis [E87.29]  Hypoxia [R09.02]  Chest pain [R07.9]  Alcohol withdrawal syndrome without complication [F10.930]    Admission Date: 12/23/2022  Expected Discharge Date: 12/30/2022    Discharge Barriers Identified: Substance Abuse    Payor: [place] [employer] / Plan: [facility] ALL SAVERS / Product Type: Commercial /     Extended Emergency Contact Information  Primary Emergency Contact: Henrique Abdul  Address: [address] [place] [address] [address] [place] [address] [facility]  Home Phone: [phone]  Relation: Spouse  Secondary Emergency Contact: Carlos Suazo  Mobile Phone: [phone]  Relation: Son    Discharge Plan A: Other (Inpatient substance abuse rehab)  Discharge Plan B: Home with family      [facility] [place] [address] - [place] - [address] [place] [address]  [address] [place] [address]  [place] [address]  Phone: [phone] Fax: [phone]    [id number] - [place] - [address] AT [facility] [address] [facility]  [address]  [place] [address]  Phone: [phone] Fax: [phone]      Initial Assessment (most recent)       Adult Discharge Assessment - 12/27/22 1500          Discharge Assessment    Assessment Type Discharge Planning Assessment     Confirmed/corrected address, phone number and insurance Yes     Confirmed Demographics Correct on Facesheet     Source of Information family;patient     Communicated BECCA with patient/caregiver Yes     Reason For Admission Alcoholic ketoacidosis     People in Home spouse     Do you expect to return to your current living situation? Yes   Patient and spouse report she will admit to an inpatient SA rehab once a flight to [place] can be obtained (earliest is Friday per spouse)    Equipment Currently Used at Home none     Readmission within 30 days? No     Patient currently  being followed by outpatient case management? No     Do you currently have service(s) that help you manage your care at home? No     Do you take prescription medications? Yes     Do you have prescription coverage? Yes     Do you have any problems affording any of your prescribed medications? No     Who is going to help you get home at discharge? Spouse     How do you get to doctors appointments? family or friend will provide     Are you on dialysis? No     Do you take coumadin? No     Discharge Plan A Other   Inpatient substance abuse rehab    Discharge Plan B Home with family     DME Needed Upon Discharge  none     Discharge Plan discussed with: Spouse/sig other;Patient     Discharge Barriers Identified Substance Abuse                 Met with patient and spouse at bedside. Spouse reports [facility] [place] who states they have an opening. Spouse states patient will need to call and do the initial intake interview. Spouse also reports the earliest flight to [place] they can obtain would be on Friday 12/30/22. Per MD team, psychiatry has no current plans for inpatient psych placement at [facility] (patient is currently under a PEC). Spouse requested to speak with medical team.  notified team.     Cristy German LCSW  [facility]  Ext. [phone]

## 2022-12-27 NOTE — ASSESSMENT & PLAN NOTE
62 year old with singiifcant PMHx of alocohl abuse, with multiple hospitalizations and rehab stays presents with nausea, bloody vomiting, tremors, and malaise for last several days. She also endorses not eating for at least a week. Glucose 50s upon admission, tremors noted. Patient awake, alert, and oriented. Given IV thiamine and started on D5NS in ED.   - Liver U/S ordered, satisfactory doppler eval  - Standing valium and prns for withdrawals along with q4 CIWA checks   - POCT glucose switched to 4x daily, sugars runs >140s <190s last 10x checks  - Resolved        38.2

## 2022-12-27 NOTE — TREATMENT PLAN
BRIEF GI TREATMENT PLAN    Over night, patient continued to withdraw from alcohol with high CIWA scores. Attempted to leave AMA last night,. Had to be given PRN Haldol and Ativan.   Tolerating clear liquids.   Still tremulous and agitated.   Hb stable at 10.2    Vitals:    12/26/22 0455 12/26/22 0751 12/26/22 1234 12/26/22 1719   BP: 135/60 (!) 159/77 134/65 129/68   BP Location: Right arm Left arm Right arm Right arm   Patient Position: Lying Lying Lying Lying   Pulse: 98 93 110 97   Resp: 18 18 18 18   Temp: 98.3 °F (36.8 °C) 98.4 °F (36.9 °C) 98.5 °F (36.9 °C) 99 °F (37.2 °C)   TempSrc: Oral Oral Oral Axillary   SpO2: (!) 91% (!) 88% (!) 90% (!) 90%   Weight:       Height:          Assessment/Plan:      Heamtemesis  Alcoholic ketoacidosis  Withdrawal from alcohol  62-year-old with history of alcohol abuse presents with alcohol intoxications and recurrent nausea vomiting.     Patient's nausea vomiting likely related to alcohol abuse possibilities of infectious gastroenteritis.    LFTs are mildly elevated, elevated WBC, lipase within normal range.  Given reported small amount of blood in her last episode of vomiting, concerns for mild GI bleeding due to recurrent nausea and vomiting.  Less clinically consistent with varcieal bleeding.   Given most recent ultrasound and clinical findings less concerned about cirrhosis. Still withdrawing from alcohol     EGD 4/2021; normal esophagus with gastritis   EGD 2/2020: small hiatal hernia. Gastritis.   C-scope 9/2020: unspecific colitis in ascending and sigmoid colon. Internal hemorrhoids.     PLAN:  -- continue alcohol withdrawal management  -- anti emetics PRNs  -- continue IV PPI 40 mg b.i.d.  -- okay for clear liquids. Aspiration precautions.   -- continue monitoring, if recurrent hematemesis or melena and worsening anemia, will consider further evaluation with EGD. Ideally after patient is stable and no longer in alcohol withdrawal.    Nima Johnson MD  PGY-IV, GI

## 2022-12-27 NOTE — PROGRESS NOTES
[facility]  Progress Note    Patient Name: Earl Abdul  MRN: [id number]  Patient Class: IP- Inpatient   Admission Date: 12/23/2022  Length of Stay: 4 days  Attending Physician: Elsa Camargo*  Primary Care Provider: Andrew Rodriguez MD        Subjective:     Principal Problem:Alcoholic ketoacidosis        HPI:  Jason a 62-year-old female with a significant past medical history of alcohol use disorder with multiple hospitalizations and rehab attempts, CLL, breast cancer, T2DM, COPD, and suicide attempt presenting to the emergency department after roughly 4 days of persistent nausea and emesis.  She states she is been binge drinking for roughly 7 days, with about 1 bottle of vodka per day.  She also states due to her depression she is not been eating for a week as well.  Last drink was roughly 6-7 hours upon time of evaluation.  For her  she was recently in a rehab facility roughly 2 weeks ago.  She does have history of delirium tremens, and seizures due to alcohol withdrawals.  She denies any hallucinations, or seizure-like activity at this time.  She is however tremulous.  She denies any fevers, chills ,shortness of breath, or sweats. She further denies any abdominal tenderness. Endorses 1 time episode of bloody emesis which has not returned.  She states she stopped taking her regular medications roughly 5 days ago.    In the ED: Glucose was found to be in the 50s, She was given IV thiamine followed by D5 normal saline.  She was also given IV Ativan.        Overview/Hospital Course:  Admitted for alcohol withdrawals with prior hx of seizures as a result. Started on 10mg valium q8 increased to q6 per psychiatry recommendations. PRN ativan as needed for further withdrawals CIWA >8. Overnight patient became very agitated, threatening to rip out IV lines and walk out the hospital. Patient was given IV haldol by team which helped with the agitation.  at  "bedside along with primary team. Patient expressed desire to go home to see grandchildren however is very noticeably tremulous  during this meeting.  expresses safety concerns as well. Psych aware, and will give further recs. In evening, patient found to be increasingly agitated, pulling IV lines, disabling her bed alarm, getting out of bed and threatening to leave AMA. Given PRN ativan and Zyprexa. She had inconsistent train of thought, slurring her speech. She was further unable to demonstrate insight into her medical condition as well as what may happen if she were to leave. In the following morning patient as found wondering the halls, asking to find the vending machine. She was redirected back to her room. When primary met with her in am she stated herself she feels "too sick" which is far removed from her previous request to go home. Per psych recs, will avoid Zyprexa, and administer haldol IM or PO. Pt still continuing to need high doses of benzodiazepines, and will likely need further taper prior to dc to rehab facility.      Interval History: NAEON. Pt required less prn Ativan overnight, but CIWA remains elevated. Will attempt decreasing frequency of valium from q6 to q8.  and pt actively searching for rehab facilities with psychotherapy offered in addition to detox.    Review of Systems   Constitutional:  Negative for appetite change, chills and fever.   HENT:  Negative for ear pain and sinus pain.    Eyes:  Negative for pain and visual disturbance.   Respiratory:  Negative for chest tightness and shortness of breath.    Cardiovascular:  Negative for leg swelling.   Gastrointestinal:  Negative for abdominal pain, constipation, diarrhea, nausea and vomiting.   Genitourinary:  Negative for dysuria.   Musculoskeletal:  Negative for gait problem.   Neurological:  Positive for tremors, weakness and headaches.   Psychiatric/Behavioral:  Positive for agitation and sleep disturbance. The patient is " nervous/anxious.    Objective:     Vital Signs (Most Recent):  Temp: 97.8 °F (36.6 °C) (12/27/22 1107)  Pulse: 105 (12/27/22 1109)  Resp: 17 (12/27/22 1109)  BP: 127/60 (12/27/22 1107)  SpO2: 95 % (12/27/22 1109) Vital Signs (24h Range):  Temp:  [97.7 °F (36.5 °C)-99.4 °F (37.4 °C)] 97.8 °F (36.6 °C)  Pulse:  [] 105  Resp:  [16-20] 17  SpO2:  [90 %-95 %] 95 %  BP: (120-149)/(58-82) 127/60     Weight: 79.5 kg (175 lb 4.3 oz)  Body mass index is 28.29 kg/m².    Intake/Output Summary (Last 24 hours) at 12/27/2022 1423  Last data filed at 12/27/2022 1300  Gross per 24 hour   Intake 420 ml   Output --   Net 420 ml      Physical Exam  Constitutional:       General: She is not in acute distress.     Appearance: Normal appearance. She is ill-appearing. She is not toxic-appearing or diaphoretic.   HENT:      Head: Normocephalic and atraumatic.      Nose: Nose normal.      Mouth/Throat:      Mouth: Mucous membranes are moist.      Pharynx: Oropharynx is clear.   Eyes:      Extraocular Movements: Extraocular movements intact.   Cardiovascular:      Rate and Rhythm: Regular rhythm. Tachycardia present.   Pulmonary:      Effort: No respiratory distress.      Breath sounds: No wheezing.   Abdominal:      General: Abdomen is flat. There is no distension.      Tenderness: There is no abdominal tenderness. There is no guarding.   Musculoskeletal:         General: No swelling or tenderness.      Right lower leg: No edema.      Left lower leg: No edema.   Skin:     Coloration: Skin is not jaundiced.      Findings: Bruising (Abdomen, nontender) present.   Neurological:      General: No focal deficit present.      Mental Status: She is alert and oriented to person, place, and time.      Motor: Tremor present.   Psychiatric:         Attention and Perception: She is attentive.         Mood and Affect: Mood is anxious and depressed.         Behavior: Behavior is agitated and hyperactive.       Significant Labs: All pertinent labs  within the past 24 hours have been reviewed.  CBC:   Recent Labs   Lab 12/26/22  0600 12/27/22  0559   WBC 5.30 5.79   HGB 10.7* 10.6*   HCT 34.6* 36.0*   PLT 50* 58*     CMP:   Recent Labs   Lab 12/26/22  1049 12/26/22  1647 12/26/22  2330 12/27/22  0559 12/27/22  0918      < > 139 141 141   K 3.5   < > 4.0 4.3 4.4      < > 106 107 107   CO2 27   < > 24 22* 24   *   < > 111* 129* 172*   BUN <3*   < > 3* 3* 4*   CREATININE 0.8   < > 0.8 0.8 0.8   CALCIUM 8.9   < > 9.3 9.2 9.0   PROT 6.2  --   --   --   --    ALBUMIN 3.6  --   --   --   --    BILITOT 1.0  --   --   --   --    ALKPHOS 80  --   --   --   --    *  --   --   --   --    ALT 90*  --   --   --   --    ANIONGAP 8   < > 9 12 10    < > = values in this interval not displayed.       Significant Imaging: I have reviewed all pertinent imaging results/findings within the past 24 hours.      Assessment/Plan:      * Alcoholic ketoacidosis  62 year old with singiifcant PMHx of alocohl abuse, with multiple hospitalizations and rehab stays presents with nausea, bloody vomiting, tremors, and malaise for last several days. She also endorses not eating for at least a week. Glucose 50s upon admission, tremors noted. Patient awake, alert, and oriented. Given IV thiamine and started on D5NS in ED.   - Liver U/S ordered, satisfactory doppler eval  - Standing valium and prns for withdrawals along with q4 CIWA checks   - POCT glucose switched to 4x daily, sugars runs >140s <190s last 10x checks  - Resolved         Hypothyroidism  Cont home synthroid       Malignant neoplasm of central portion of right breast in female, estrogen receptor positive  Med rec notable for aripiprazole taken once daily. Further hx of CLL/CMBL, WBC currently 32 with no indication of infectious process currently   - Will consult heme/onc, appreciate recs        Hyperlipidemia  Holding statin in setting of elevated LFTs       Bloody emesis  Patient endorsed 1x lepidote of nausea  "and blooding vomiting. Noted dried red stains on blanket and patient clothes. States has never happened before and she has no hx that would allude to this. Hgb upon admission stable at 12   - Daily CBCs   - EGD from April 2021 was noncontributory  - prontonix 80mg IV followed by 40mg q12   - GI consulted, appreciate recs   - No further episodes of hematemesis during hospitalization  - Full diet restarted      Alcohol use disorder, severe, dependence  Patient with significant hx of alcohol abuse requiring multiple hospitalizations as well as several rehab admissions per  presetns after 1x week of no eating and binge drinking a 5th of vodka per day. Patient is AAOx3 but tremulous. Last drink was 6-8 hours prior.      Plan    - CIWA driven as below   - Thiamine IV given in  - Monitor daily CMP and closely monitor electrolytes  - Seizure precautions and CIWA q4 ordered    - PO Valium standing 10mg TID for now, increased to q6 per psych recs    - PRN ativan for breakthrough symptoms tremors noted thus far - needed overnight   - Haldol given overnigt due to substantially increased agitation, psych recommends oral or IM administration if needed  - Per psych, will use IM/PO haldol for increased agitation (and avoid zyprexa)  - Episodes of increased agitation, requiring haldol and Zyprexa PRN support, along with her max dose of standing valium. Patient is redirectable at one point stating "she's very sick." Multiple requests however to be discharged but has been unable to voice understanding of her current medical condition or the consequences if she chose to stop treatment altogether. Patient also found to slur.   - Will continue PEC while inpatient; however, pt will not need inpatient psych on dc. PEC continued as precaution for pt who has attempted leaving AMA multiple times and lacks insight into her disease.  - Valium taper decreased to 10mg Q8            Ciwa driven     CIWA,   Frequency          Medication    D "            ose    < 8      Q4H x 6 - If CIWA-AR score < 8, stop None  8?14    Q2H           Lorazepam  1 mg  15?20  Q1H           Lorazepam 2 mg  21?30  Q1H           Lorazepam 3 mg  31?45  Q1H           Lorazepam4 mg  [id number] Minutes          Lorazepam 2 mg        Anxiety and Depression  Patient takes Abilify and Cymbalta  - Will continue to hold  - Restarting home trazodone for sleep        VTE Risk Mitigation (From admission, onward)         Ordered     enoxaparin injection 40 mg  Daily         12/27/22 0942     IP VTE HIGH RISK PATIENT  Once         12/23/22 0651     Place sequential compression device  Until discontinued         12/23/22 0651                Discharge Planning   BECCA: 12/30/2022     Code Status: Full Code   Is the patient medically ready for discharge?: No    Reason for patient still in hospital (select all that apply): Treatment and Pending disposition                     Fredrick Frederick MD  Department of Hospital Medicine   [facility]

## 2022-12-27 NOTE — PROGRESS NOTES
ADDICTION CONSULT FOLLOW UP VISIT     DEPARTMENT:  Psychiatry  SITE: [facility], [address]    DATE OF ADMISSION: 12/23/2022  4:19 AM  LENGTH OF STAY: 4 days    EXAMINING PRACTITIONER: Boubacar Majano      SUBJECTIVE:     HISTORY    Patient Name: Earl Abdul  YOB: 1960    CHIEF COMPLAINT   Earl Abdul is a 62 y.o. female who is being seen today for a follow up visit by the addiction psychiatry consult service.  Earl Abdul presents with the chief complaint of: Alcoholic Ketoacidosis, in withdrawal    HPI & PSYCHIATRIC ROS    Per Primary Team:  Earl Abdul is a 62-year-old female with a past medical history of alcohol use disorder, CLL/MBCL, sarcoidosis, T2DM, COPD on QHS O2, HLD, HTN, PRES, suicide attempt, pancreatitis, and migraines who presents to the emergency department for evaluation of 4 days of persistent nausea and nonbloody nonbilious emesis and 7 days of heavy binge drinking.  Patient reports drinking 1 bottle of vodka per day and says her last drink was 3-4 hours prior to arrival in the ED. patient's spouse at bedside helps provide some of the history and says that she recently spent time in a rehab facility but began drinking alcohol again 2 weeks ago and was progressively drinking more and more.  She has a history of delirium tremens including hallucinations and 1 prior seizure during alcohol withdrawal.  Patient is denying any current auditory or visual hallucinations , but does endorse a generalized tremor and anxiety.  She also reports a 7/10 gradual onset generalized headache and says that she has headaches most days when she is drinking.     She denies fever, chills, cough, dyspnea, chest pain, abdominal pain, diarrhea, dysuria, flank pain, hematuria, blood in her stool, hematemesis, rashes, easy bleeding, constipation, abdominal distension.  She says she had something wrong with her liver previously but does not know what it was.  She denies  ever having ascites.  Has been additionally says she stopped taking all of her medications 4-5 days ago including Abilify and duloxetine.    Initial Consult Note:  Earl Abdul is a 62-year-old female with a past medical history of alcohol use disorder, CLL/MBCL, sarcoidosis, T2DM, COPD on QHS O2, HLD, HTN, PRES, suicide attempt, pancreatitis, and migraines.  Pt presented to ED for n/v for several days in setting of a binge alcohol use x 7 days.  Pt reports that they had a party where alcohol was served and just continued to drink in the days after.  She states that she was at [facility] a few weeks ago for a month.  Pt states that she started drinking in her 40s after her  refused to have intimate relations with her.  She states that she has been sober for 5 or more years during the interim.  She states that she has been to rehab 5 or more times and that she has had a difficult time quitting.  She is interested in going to rehab facility after hospitalization.  .       Interview is limited by patient seemingly falling asleep.  However, pt was alert and awake for attending in interim.    12/27/2022:  NAEON. Vitals generally stable overnight. Some tachycardia around 3am and 8am. BP mildly elevated to 159/77 this AM, but 120/58 later. Mild LFT elevations remain. Has not been restarted on Abilify, Cymbalta or Gabapentin. Received PRN Ativan 1mg around 1AM. She says today that is mostly concerned with poor sleep, stating she did not get her Trazodone. She denies any other withdrawal symptoms like AVH, vomiting. She has some unchanged nausea, minimal, and has been eating well. She denies any episodes of agitation or confusion. She says she wants to leave the hospital and continue her taper on her way to rehab. She and her  are planning on her going to a rehab in possibly [place], [place]  ([place]) or [place] ([facility]). She feels she can leave today but understands she needs medical care too. She has  better insight today, and denies SI/HI/AVH.     PFSH: The patient's past medical history has been reviewed and updated as appropriate within the electronic medical record system.    ROS:  Complete review of systems performed covering Constitutional, Eyes, ENT/Mouth, Cardiovascular, Respiratory, Gastrointestinal, Genitourinary, Musculoskeletal, Skin, Neurologic, Endocrine, Heme/Lymph, and Allergy/Immune.      Complete review of systems was negative with the exception of the following positive symptoms: nausea, tremor    PSYCHOTROPIC MEDICATIONS   Valium 10mg PO q8h for alcohol withdrawal  Ativan 2mg IV/IM q6h PRN for CIWA > 8      OBJECTIVE:     EXAMINATION    Vitals:    12/26/22 2014 12/26/22 2332 12/27/22 0328 12/27/22 0813   BP: 133/65 (!) 149/82 138/64 (!) 120/58   BP Location:    Left arm   Patient Position:   Lying Lying   Pulse: 101 110 (!) 114 (!) 111   Resp: 19 20 18 20   Temp: 99.4 °F (37.4 °C)  97.7 °F (36.5 °C) 98.3 °F (36.8 °C)   TempSrc:   Oral Axillary   SpO2: (!) 90% (!) 92% (!) 92% (!) 91%   Weight:       Height:           CONSTITUTIONAL  General Appearance: appropriately dressed, adequately groomed    MUSCULOSKELETAL  Abnormal Involuntary Movements: no abnormal involuntary movements noted, no psychomotor agitation or retardation    PSYCHIATRIC   Appearance: malnourished, lying in bed  Behavior/Cooperation: Angry, wants to go home, restless  Speech: non-spontaneous  Mood: irritable  Affect: irritable and anxious   Thought Process: organized  Thought Content: No SI, HI  Orientation:  unable to assess  Memory: Unable to assess  Attention Span/Concentration: Decreased  Insight: poor  Judgment: poor    ASSESSMENT:     DIAGNOSES & PROBLEMS    Alcohol Use Disorder, Severe  Alcohol Withdrawal    Patient Active Problem List   Diagnosis    Anxiety and Depression    Alcohol use disorder, severe, dependence    Alcohol withdrawal    Essential hypertension    Fibromyalgia    Tobacco abuse    Cannabis abuse, in  remission    Sarcoidosis    History of Clostridium difficile colitis    Diarrhea    Dehydration    History of substance abuse    Severe sepsis    Pyelonephritis due to Escherichia coli    Bloody emesis    New onset seizure    Posterior reversible encephalopathy syndrome    Depression with anxiety    Erythema nodosum    History of sarcoidosis    Hyperlipidemia    Ramírez's syndrome    Degenerative joint disease (DJD) of lumbar spine    Decreased ROM of lumbar spine    Difficulty walking    VINICIO (obstructive sleep apnea)    At risk for lymphedema    Malignant neoplasm of central portion of right breast in female, estrogen receptor positive    COPD (chronic obstructive pulmonary disease)    Incontinence of feces    Low serum vitamin B12    Anemia    Urge incontinence of urine    Hypothyroidism    Type 2 diabetes mellitus with hyperglycemia    Obesity (BMI 30.0-34.9)    Fecal incontinence    Mixed incontinence    Pelvic floor tension    Chronic hypoxemic respiratory failure    COVID-19    Hypoxia    Shortness of breath    Alcoholic ketoacidosis    E coli bacteremia    Lymphocytosis    Migraine without aura and with status migrainosus, not intractable    OAB (overactive bladder)    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   - Holding home Abilify and Cymbalta  - Please give Trazodone 200mg po tonight for sleep  - Patient and  are planning to arrange residential rehab today     - Frequent  assessment with the CIWA-Ar is the standard of care and the hallmark of clinical practice  - Clinical [place] Withdrawal Assessment of Alcohol Scale (CIWA-Ar)  If CIWA is <8 and vital signs are stable, no PRN medication is required. Repeat vital signs q4 and the CIWA q8.  If CIWA is between 8 and 15, give Lorazepam 2 mg PO/IM q4 PRN and complete vital signs q2 and the CIWA q4.  If CIWA is ?15 or DBP ?110 mmHg, give Lorazepam 2 mg PO/IM q1 until patient has a CIWA of <15 or DBP <110 mmHg. CIWA and vital signs checked q1 until patients CIWA is <15 and DBP <110 mmHg  Call MD for for SBP >180, DBP >120, or HR >110 or if patient requires ?6 mg of lorazepam in 3 hours.  - Vitals q4hr; Ativan 2mg PO/IM q4hr PRN, for CIWA >8 or if 2 criteria are met: Systolic BP>160, Diastolic BP>100 or HR>100  Please do not give Zyprexa within 2 hours of any ativan dose due to risk of respiratory depression  Would continue to monitor EKG, especially if receiving increased PRN's; last Qtc 438ms on 12/23.  - Vitamin supplementation - Thiamine 100 mg daily, Folate 1 mg daily, MVI daily, and Vitamin B12  - If not already ordered: CBC, CMP, liver panel (prior to initiating taper), B12, folate, iron panel, thiamine level.  - Seizure precautions. Seizures generally occur between 12-48 hours after alcohol cessation. Monitor for hypoglycemia, hypocalcemia, and hypomagnesemia as these can predispose to seizure.  - Monitor for AMS, ataxia and nystagmus as these can be signs of Wernicke's Encephalopathy.  - Withdrawal usually begins within 6-8 hours after an abrupt reduction in alcohol/benzo intake but may not manifest for up to 72 hours; it generally peaks within 10-30 hours and lasts for 3-7 days  - Concern for complicated withdrawal in this pt. Monitor for delirium tremens and seizures. Recommend/agree with benzo taper:    In cases of emergency, daily coverage provided by Acute/ER Psych MD, NP, or SW, with contact numbers located in [facility]  [facility] On Call Schedule    Case discussed with staff addiction psychiatrist: MD Tj ROSARIO MD  [employer] - [facility], PGY-2    LABS/IMAGING/STUDIES   Recent Results (from the past 48 hour(s))   CBC auto differential    Collection Time: 12/25/22  9:15 AM   Result Value Ref Range    WBC 5.25 3.90 - 12.70 K/uL    RBC 3.59 (L) 4.00 - 5.40 M/uL    Hemoglobin 10.2 (L) 12.0 - 16.0 g/dL    Hematocrit 33.2 (L) 37.0 - 48.5 %    MCV 93 82 - 98 fL    MCH 28.4 27.0 - 31.0 pg    MCHC 30.7 (L) 32.0 - 36.0 g/dL    RDW 17.7 (H) 11.5 - 14.5 %    Platelets 47 (L) 150 - 450 K/uL    MPV 10.3 9.2 - 12.9 fL    Immature Granulocytes 0.6 (H) 0.0 - 0.5 %    Gran # (ANC) 1.6 (L) 1.8 - 7.7 K/uL    Immature Grans (Abs) 0.03 0.00 - 0.04 K/uL    Lymph # 3.2 1.0 - 4.8 K/uL    Mono # 0.3 0.3 - 1.0 K/uL    Eos # 0.0 0.0 - 0.5 K/uL    Baso # 0.01 0.00 - 0.20 K/uL    nRBC 0 0 /100 WBC    Gran % 31.2 (L) 38.0 - 73.0 %    Lymph % 61.5 (H) 18.0 - 48.0 %    Mono % 6.1 4.0 - 15.0 %    Eosinophil % 0.4 0.0 - 8.0 %    Basophil % 0.2 0.0 - 1.9 %    Differential Method Automated    Basic Metabolic Panel (BMP)    Collection Time: 12/25/22  9:15 AM   Result Value Ref Range    Sodium 141 136 - 145 mmol/L    Potassium 3.5 3.5 - 5.1 mmol/L    Chloride 102 95 - 110 mmol/L    CO2 27 23 - 29 mmol/L    Glucose 122 (H) 70 - 110 mg/dL    BUN <3 (L) 8 - 23 mg/dL    Creatinine 0.7 0.5 - 1.4 mg/dL    Calcium 9.0 8.7 - 10.5 mg/dL    Anion Gap 12 8 - 16 mmol/L    eGFR >60.0 >60 mL/min/1.73 m^2   Basic Metabolic Panel (BMP)    Collection Time: 12/25/22 11:09 AM   Result Value Ref Range    Sodium 138 136 - 145 mmol/L    Potassium 3.7 3.5 - 5.1 mmol/L    Chloride 100 95 - 110 mmol/L    CO2 29 23 - 29 mmol/L    Glucose 136 (H) 70 - 110 mg/dL    BUN <3 (L) 8 - 23 mg/dL    Creatinine 0.8 0.5 - 1.4 mg/dL    Calcium 9.3 8.7 - 10.5 mg/dL    Anion Gap 9 8 - 16 mmol/L    eGFR >60.0 >60 mL/min/1.73 m^2   POCT glucose    Collection Time: 12/25/22  1:11 PM    Result Value Ref Range    POCT Glucose 121 (H) 70 - 110 mg/dL   Basic Metabolic Panel (BMP)    Collection Time: 12/25/22  9:16 PM   Result Value Ref Range    Sodium 140 136 - 145 mmol/L    Potassium 3.7 3.5 - 5.1 mmol/L    Chloride 102 95 - 110 mmol/L    CO2 25 23 - 29 mmol/L    Glucose 154 (H) 70 - 110 mg/dL    BUN <3 (L) 8 - 23 mg/dL    Creatinine 0.9 0.5 - 1.4 mg/dL    Calcium 8.9 8.7 - 10.5 mg/dL    Anion Gap 13 8 - 16 mmol/L    eGFR >60.0 >60 mL/min/1.73 m^2   POCT glucose    Collection Time: 12/25/22  9:18 PM   Result Value Ref Range    POCT Glucose 149 (H) 70 - 110 mg/dL   Basic Metabolic Panel (BMP)    Collection Time: 12/25/22 11:26 PM   Result Value Ref Range    Sodium 141 136 - 145 mmol/L    Potassium 3.4 (L) 3.5 - 5.1 mmol/L    Chloride 103 95 - 110 mmol/L    CO2 26 23 - 29 mmol/L    Glucose 121 (H) 70 - 110 mg/dL    BUN <3 (L) 8 - 23 mg/dL    Creatinine 0.7 0.5 - 1.4 mg/dL    Calcium 8.8 8.7 - 10.5 mg/dL    Anion Gap 12 8 - 16 mmol/L    eGFR >60.0 >60 mL/min/1.73 m^2   CBC auto differential    Collection Time: 12/26/22  6:00 AM   Result Value Ref Range    WBC 5.30 3.90 - 12.70 K/uL    RBC 3.72 (L) 4.00 - 5.40 M/uL    Hemoglobin 10.7 (L) 12.0 - 16.0 g/dL    Hematocrit 34.6 (L) 37.0 - 48.5 %    MCV 93 82 - 98 fL    MCH 28.8 27.0 - 31.0 pg    MCHC 30.9 (L) 32.0 - 36.0 g/dL    RDW 18.6 (H) 11.5 - 14.5 %    Platelets 50 (L) 150 - 450 K/uL    MPV 11.0 9.2 - 12.9 fL    Immature Granulocytes 0.2 0.0 - 0.5 %    Gran # (ANC) 1.6 (L) 1.8 - 7.7 K/uL    Immature Grans (Abs) 0.01 0.00 - 0.04 K/uL    Lymph # 3.2 1.0 - 4.8 K/uL    Mono # 0.4 0.3 - 1.0 K/uL    Eos # 0.1 0.0 - 0.5 K/uL    Baso # 0.01 0.00 - 0.20 K/uL    nRBC 0 0 /100 WBC    Gran % 30.8 (L) 38.0 - 73.0 %    Lymph % 60.9 (H) 18.0 - 48.0 %    Mono % 7.0 4.0 - 15.0 %    Eosinophil % 0.9 0.0 - 8.0 %    Basophil % 0.2 0.0 - 1.9 %    Differential Method Automated    Basic Metabolic Panel (BMP)    Collection Time: 12/26/22  6:00 AM   Result Value Ref Range     Sodium 134 (L) 136 - 145 mmol/L    Potassium 3.3 (L) 3.5 - 5.1 mmol/L    Chloride 101 95 - 110 mmol/L    CO2 25 23 - 29 mmol/L    Glucose 136 (H) 70 - 110 mg/dL    BUN <3 (L) 8 - 23 mg/dL    Creatinine 0.8 0.5 - 1.4 mg/dL    Calcium 8.5 (L) 8.7 - 10.5 mg/dL    Anion Gap 8 8 - 16 mmol/L    eGFR >60.0 >60 mL/min/1.73 m^2   Magnesium    Collection Time: 12/26/22  6:00 AM   Result Value Ref Range    Magnesium 1.5 (L) 1.6 - 2.6 mg/dL   Phosphorus    Collection Time: 12/26/22  6:00 AM   Result Value Ref Range    Phosphorus 1.5 (L) 2.7 - 4.5 mg/dL   Basic Metabolic Panel (BMP)    Collection Time: 12/26/22  8:07 AM   Result Value Ref Range    Sodium 137 136 - 145 mmol/L    Potassium 3.3 (L) 3.5 - 5.1 mmol/L    Chloride 100 95 - 110 mmol/L    CO2 25 23 - 29 mmol/L    Glucose 109 70 - 110 mg/dL    BUN <3 (L) 8 - 23 mg/dL    Creatinine 0.7 0.5 - 1.4 mg/dL    Calcium 8.5 (L) 8.7 - 10.5 mg/dL    Anion Gap 12 8 - 16 mmol/L    eGFR >60.0 >60 mL/min/1.73 m^2   Comprehensive metabolic panel    Collection Time: 12/26/22 10:49 AM   Result Value Ref Range    Sodium 136 136 - 145 mmol/L    Potassium 3.5 3.5 - 5.1 mmol/L    Chloride 101 95 - 110 mmol/L    CO2 27 23 - 29 mmol/L    Glucose 159 (H) 70 - 110 mg/dL    BUN <3 (L) 8 - 23 mg/dL    Creatinine 0.8 0.5 - 1.4 mg/dL    Calcium 8.9 8.7 - 10.5 mg/dL    Total Protein 6.2 6.0 - 8.4 g/dL    Albumin 3.6 3.5 - 5.2 g/dL    Total Bilirubin 1.0 0.1 - 1.0 mg/dL    Alkaline Phosphatase 80 55 - 135 U/L     (H) 10 - 40 U/L    ALT 90 (H) 10 - 44 U/L    Anion Gap 8 8 - 16 mmol/L    eGFR >60.0 >60 mL/min/1.73 m^2   Basic Metabolic Panel (BMP)    Collection Time: 12/26/22  4:47 PM   Result Value Ref Range    Sodium 140 136 - 145 mmol/L    Potassium 3.9 3.5 - 5.1 mmol/L    Chloride 102 95 - 110 mmol/L    CO2 26 23 - 29 mmol/L    Glucose 136 (H) 70 - 110 mg/dL    BUN 3 (L) 8 - 23 mg/dL    Creatinine 0.8 0.5 - 1.4 mg/dL    Calcium 9.1 8.7 - 10.5 mg/dL    Anion Gap 12 8 - 16 mmol/L    eGFR >60.0  >60 mL/min/1.73 m^2   Basic Metabolic Panel (BMP)    Collection Time: 12/26/22  7:54 PM   Result Value Ref Range    Sodium 140 136 - 145 mmol/L    Potassium 4.2 3.5 - 5.1 mmol/L    Chloride 106 95 - 110 mmol/L    CO2 23 23 - 29 mmol/L    Glucose 131 (H) 70 - 110 mg/dL    BUN 3 (L) 8 - 23 mg/dL    Creatinine 0.8 0.5 - 1.4 mg/dL    Calcium 9.6 8.7 - 10.5 mg/dL    Anion Gap 11 8 - 16 mmol/L    eGFR >60.0 >60 mL/min/1.73 m^2   POCT glucose    Collection Time: 12/26/22  9:20 PM   Result Value Ref Range    POCT Glucose 117 (H) 70 - 110 mg/dL   Basic Metabolic Panel (BMP)    Collection Time: 12/26/22 11:30 PM   Result Value Ref Range    Sodium 139 136 - 145 mmol/L    Potassium 4.0 3.5 - 5.1 mmol/L    Chloride 106 95 - 110 mmol/L    CO2 24 23 - 29 mmol/L    Glucose 111 (H) 70 - 110 mg/dL    BUN 3 (L) 8 - 23 mg/dL    Creatinine 0.8 0.5 - 1.4 mg/dL    Calcium 9.3 8.7 - 10.5 mg/dL    Anion Gap 9 8 - 16 mmol/L    eGFR >60.0 >60 mL/min/1.73 m^2   CBC auto differential    Collection Time: 12/27/22  5:59 AM   Result Value Ref Range    WBC 5.79 3.90 - 12.70 K/uL    RBC 3.77 (L) 4.00 - 5.40 M/uL    Hemoglobin 10.6 (L) 12.0 - 16.0 g/dL    Hematocrit 36.0 (L) 37.0 - 48.5 %    MCV 96 82 - 98 fL    MCH 28.1 27.0 - 31.0 pg    MCHC 29.4 (L) 32.0 - 36.0 g/dL    RDW 19.1 (H) 11.5 - 14.5 %    Platelets 58 (L) 150 - 450 K/uL    MPV 11.2 9.2 - 12.9 fL    Immature Granulocytes 0.2 0.0 - 0.5 %    Gran # (ANC) 1.6 (L) 1.8 - 7.7 K/uL    Immature Grans (Abs) 0.01 0.00 - 0.04 K/uL    Lymph # 3.6 1.0 - 4.8 K/uL    Mono # 0.5 0.3 - 1.0 K/uL    Eos # 0.1 0.0 - 0.5 K/uL    Baso # 0.01 0.00 - 0.20 K/uL    nRBC 0 0 /100 WBC    Gran % 28.1 (L) 38.0 - 73.0 %    Lymph % 61.3 (H) 18.0 - 48.0 %    Mono % 9.2 4.0 - 15.0 %    Eosinophil % 1.0 0.0 - 8.0 %    Basophil % 0.2 0.0 - 1.9 %    Differential Method Automated    Basic Metabolic Panel (BMP)    Collection Time: 12/27/22  5:59 AM   Result Value Ref Range    Sodium 141 136 - 145 mmol/L    Potassium 4.3 3.5 -  5.1 mmol/L    Chloride 107 95 - 110 mmol/L    CO2 22 (L) 23 - 29 mmol/L    Glucose 129 (H) 70 - 110 mg/dL    BUN 3 (L) 8 - 23 mg/dL    Creatinine 0.8 0.5 - 1.4 mg/dL    Calcium 9.2 8.7 - 10.5 mg/dL    Anion Gap 12 8 - 16 mmol/L    eGFR >60.0 >60 mL/min/1.73 m^2   Magnesium    Collection Time: 12/27/22  5:59 AM   Result Value Ref Range    Magnesium 1.5 (L) 1.6 - 2.6 mg/dL   Phosphorus    Collection Time: 12/27/22  5:59 AM   Result Value Ref Range    Phosphorus 2.3 (L) 2.7 - 4.5 mg/dL   POCT glucose    Collection Time: 12/27/22  7:37 AM   Result Value Ref Range    POCT Glucose 119 (H) 70 - 110 mg/dL      No results found for: PHENYTOIN, PHENOBARB, VALPROATE, CBMZ

## 2022-12-27 NOTE — ASSESSMENT & PLAN NOTE
Patient endorsed 1x lepidote of nausea and blooding vomiting. Noted dried red stains on blanket and patient clothes. States has never happened before and she has no hx that would allude to this. Hgb upon admission stable at 12   - Daily CBCs   - EGD from April 2021 was noncontributory  - prontonix 80mg IV followed by 40mg q12   - GI consulted, appreciate recs   - No further episodes of hematemesis during hospitalization  - Full diet restarted

## 2022-12-27 NOTE — SUBJECTIVE & OBJECTIVE
Interval History: NAEON. Pt required less prn Ativan overnight, but CIWA remains elevated. Will attempt decreasing frequency of valium from q6 to q8.  and pt actively searching for rehab facilities with psychotherapy offered in addition to detox.    Review of Systems   Constitutional:  Negative for appetite change, chills and fever.   HENT:  Negative for ear pain and sinus pain.    Eyes:  Negative for pain and visual disturbance.   Respiratory:  Negative for chest tightness and shortness of breath.    Cardiovascular:  Negative for leg swelling.   Gastrointestinal:  Negative for abdominal pain, constipation, diarrhea, nausea and vomiting.   Genitourinary:  Negative for dysuria.   Musculoskeletal:  Negative for gait problem.   Neurological:  Positive for tremors, weakness and headaches.   Psychiatric/Behavioral:  Positive for agitation and sleep disturbance. The patient is nervous/anxious.    Objective:     Vital Signs (Most Recent):  Temp: 97.8 °F (36.6 °C) (12/27/22 1107)  Pulse: 105 (12/27/22 1109)  Resp: 17 (12/27/22 1109)  BP: 127/60 (12/27/22 1107)  SpO2: 95 % (12/27/22 1109) Vital Signs (24h Range):  Temp:  [97.7 °F (36.5 °C)-99.4 °F (37.4 °C)] 97.8 °F (36.6 °C)  Pulse:  [] 105  Resp:  [16-20] 17  SpO2:  [90 %-95 %] 95 %  BP: (120-149)/(58-82) 127/60     Weight: 79.5 kg (175 lb 4.3 oz)  Body mass index is 28.29 kg/m².    Intake/Output Summary (Last 24 hours) at 12/27/2022 1423  Last data filed at 12/27/2022 1300  Gross per 24 hour   Intake 420 ml   Output --   Net 420 ml      Physical Exam  Constitutional:       General: She is not in acute distress.     Appearance: Normal appearance. She is ill-appearing. She is not toxic-appearing or diaphoretic.   HENT:      Head: Normocephalic and atraumatic.      Nose: Nose normal.      Mouth/Throat:      Mouth: Mucous membranes are moist.      Pharynx: Oropharynx is clear.   Eyes:      Extraocular Movements: Extraocular movements intact.   Cardiovascular:       Rate and Rhythm: Regular rhythm. Tachycardia present.   Pulmonary:      Effort: No respiratory distress.      Breath sounds: No wheezing.   Abdominal:      General: Abdomen is flat. There is no distension.      Tenderness: There is no abdominal tenderness. There is no guarding.   Musculoskeletal:         General: No swelling or tenderness.      Right lower leg: No edema.      Left lower leg: No edema.   Skin:     Coloration: Skin is not jaundiced.      Findings: Bruising (Abdomen, nontender) present.   Neurological:      General: No focal deficit present.      Mental Status: She is alert and oriented to person, place, and time.      Motor: Tremor present.   Psychiatric:         Attention and Perception: She is attentive.         Mood and Affect: Mood is anxious and depressed.         Behavior: Behavior is agitated and hyperactive.       Significant Labs: All pertinent labs within the past 24 hours have been reviewed.  CBC:   Recent Labs   Lab 12/26/22  0600 12/27/22  0559   WBC 5.30 5.79   HGB 10.7* 10.6*   HCT 34.6* 36.0*   PLT 50* 58*     CMP:   Recent Labs   Lab 12/26/22  1049 12/26/22  1647 12/26/22  2330 12/27/22  0559 12/27/22  0918      < > 139 141 141   K 3.5   < > 4.0 4.3 4.4      < > 106 107 107   CO2 27   < > 24 22* 24   *   < > 111* 129* 172*   BUN <3*   < > 3* 3* 4*   CREATININE 0.8   < > 0.8 0.8 0.8   CALCIUM 8.9   < > 9.3 9.2 9.0   PROT 6.2  --   --   --   --    ALBUMIN 3.6  --   --   --   --    BILITOT 1.0  --   --   --   --    ALKPHOS 80  --   --   --   --    *  --   --   --   --    ALT 90*  --   --   --   --    ANIONGAP 8   < > 9 12 10    < > = values in this interval not displayed.       Significant Imaging: I have reviewed all pertinent imaging results/findings within the past 24 hours.

## 2022-12-27 NOTE — PROGRESS NOTES
CONSULTATION LIAISON PSYCHIATRY PROGRESS NOTE    Patient Name: Earl Abdul  MRN: [id number]  Patient Class: IP- Inpatient  Admission Date: 12/23/2022  Attending Physician: Elsa Camargo*      SUBJECTIVE:   Earl Abdul is a 62 y.o. female with past psychiatric history of EtOH Use Disorder & past pertinent medical history of T2DM, sarcoidosis, PRES, pancreatitis, migraines presents to the ED/ admitted to the hospital for Alcoholic ketoacidosis    Psychiatry consulted for EtOH withdrawal  Per chart review, pt required PRN Haldol 5 IM and IV at 2030 and 0333. Also received PRN ativan at 0854, 1708, 2307, and 0232 as well as Zyprexa 5 IM at 1708. Still requiring Valium 10 q 6 scheduled. Hypertensive and tachycardic this am.     Patient seen and chart reviewed. She irritable and restless today on initial interview. She states that she hasnt had a seizure in 100 years, wants to go home and has a hard time with the reasoning about why she should stay.  He  states that her psychiatrist Dr Hale visited today.  Pt states that her n/v/d resolved.  She continues with hand tremor although much improved from yesterday.  Continues to have behavioral disturbances per Rns.    OBJECTIVE:    Mental Status Exam:  Appearance: malnourished, lying in bed  Behavior/Cooperation: Angry, wants to go home, restless  Speech: non-spontaneous  Mood: irritable  Affect: irritable and anxious   Thought Process: organized  Thought Content: No SI, HI  Orientation:  unable to assess  Memory: Unable to assess  Attention Span/Concentration: Decreased  Insight: poor  Judgment: poor    CAM ICU positive? Unable to assess    ASSESSMENT & RECOMMENDATIONS   Alcohol Use Disorder, Severe  Alcohol Withdrawal  Continue valium 10mg q6h  May give PRN haldol 5mg po/IM q6h for non-redirectable agitation; would avoid giving IV haldol given risk of QT prolongation.  Please do not give Zyprexa within 2 hours of any ativan dose due to risk of  respiratory depression  Would continue to monitor EKG, especially if receiving increased PRN's; last Qtc 438ms on 12/23.  Continue PRN ativan for CIWA >8 or if 2 criteria are met: Systolic BP>160, Diastolic BP>100 or HR>100  Continue Vitamin supplementation - Thiamine 100 mg daily, Folate 1 mg daily, MVI daily, and Vitamin B12  Seizure precautions. Seizures generally occur between 12-48 hours after alcohol cessation. Monitor for hypoglycemia, hypocalcemia, and hypomagnesemia as these can predispose to seizure.  If patient asking to leave AMA, may assess for patient's ability to understand and verbalize the risks and benefits of leaving AMA (including seizure, falls, confusion, death) as well as staying in hospital (to complete taper safely) to assess for capacity to leave AMA.    RISK ASSESSMENT  CONTINUE PEC.    FOLLOW UP  Will follow up while in house    DISPOSITION- Once medically cleared;   Defer to medical team    Please contact ON CALL psychiatry service (24/7) for any acute issues that may arise.    Dr. Luis Pena MD   Psychiatry PGY 2  [facility]  12/26/2022 5:56 PM        --------------------------------------------------------------------------------------------------------------------------------------------------------------------------------------------------------------------------------------    CONTINUED OBJECTIVE clinical data & findings reviewed and noted for above decision making    Current Medications:   Scheduled Meds:    diazePAM  10 mg Oral Q6H    folic acid  1 mg Oral Daily    levothyroxine  50 mcg Oral Before breakfast    multivitamin  1 tablet Oral Daily    nicotine  1 patch Transdermal Daily    pantoprazole  40 mg Oral BID AC    potassium chloride  40 mEq Oral BID    thiamine  100 mg Oral Daily     PRN Meds: dextrose 10%, dextrose 10%, glucagon (human recombinant), glucose, glucose, influenza, lorazepam, lorazepam, naloxone, ondansetron, sodium chloride  0.9%    Allergies:   Review of patient's allergies indicates:   Allergen Reactions    Lortab [hydrocodone-acetaminophen] Itching    Promethazine Itching and Other (See Comments)       Vitals  Vitals:    12/26/22 1719   BP: 129/68   Pulse: 97   Resp: 18   Temp: 99 °F (37.2 °C)       Labs/Imaging/Studies:  Recent Results (from the past 24 hour(s))   Basic Metabolic Panel (BMP)    Collection Time: 12/25/22  9:16 PM   Result Value Ref Range    Sodium 140 136 - 145 mmol/L    Potassium 3.7 3.5 - 5.1 mmol/L    Chloride 102 95 - 110 mmol/L    CO2 25 23 - 29 mmol/L    Glucose 154 (H) 70 - 110 mg/dL    BUN <3 (L) 8 - 23 mg/dL    Creatinine 0.9 0.5 - 1.4 mg/dL    Calcium 8.9 8.7 - 10.5 mg/dL    Anion Gap 13 8 - 16 mmol/L    eGFR >60.0 >60 mL/min/1.73 m^2   POCT glucose    Collection Time: 12/25/22  9:18 PM   Result Value Ref Range    POCT Glucose 149 (H) 70 - 110 mg/dL   Basic Metabolic Panel (BMP)    Collection Time: 12/25/22 11:26 PM   Result Value Ref Range    Sodium 141 136 - 145 mmol/L    Potassium 3.4 (L) 3.5 - 5.1 mmol/L    Chloride 103 95 - 110 mmol/L    CO2 26 23 - 29 mmol/L    Glucose 121 (H) 70 - 110 mg/dL    BUN <3 (L) 8 - 23 mg/dL    Creatinine 0.7 0.5 - 1.4 mg/dL    Calcium 8.8 8.7 - 10.5 mg/dL    Anion Gap 12 8 - 16 mmol/L    eGFR >60.0 >60 mL/min/1.73 m^2   CBC auto differential    Collection Time: 12/26/22  6:00 AM   Result Value Ref Range    WBC 5.30 3.90 - 12.70 K/uL    RBC 3.72 (L) 4.00 - 5.40 M/uL    Hemoglobin 10.7 (L) 12.0 - 16.0 g/dL    Hematocrit 34.6 (L) 37.0 - 48.5 %    MCV 93 82 - 98 fL    MCH 28.8 27.0 - 31.0 pg    MCHC 30.9 (L) 32.0 - 36.0 g/dL    RDW 18.6 (H) 11.5 - 14.5 %    Platelets 50 (L) 150 - 450 K/uL    MPV 11.0 9.2 - 12.9 fL    Immature Granulocytes 0.2 0.0 - 0.5 %    Gran # (ANC) 1.6 (L) 1.8 - 7.7 K/uL    Immature Grans (Abs) 0.01 0.00 - 0.04 K/uL    Lymph # 3.2 1.0 - 4.8 K/uL    Mono # 0.4 0.3 - 1.0 K/uL    Eos # 0.1 0.0 - 0.5 K/uL    Baso # 0.01 0.00 - 0.20 K/uL    nRBC 0 0 /100 WBC     Gran % 30.8 (L) 38.0 - 73.0 %    Lymph % 60.9 (H) 18.0 - 48.0 %    Mono % 7.0 4.0 - 15.0 %    Eosinophil % 0.9 0.0 - 8.0 %    Basophil % 0.2 0.0 - 1.9 %    Differential Method Automated    Basic Metabolic Panel (BMP)    Collection Time: 12/26/22  6:00 AM   Result Value Ref Range    Sodium 134 (L) 136 - 145 mmol/L    Potassium 3.3 (L) 3.5 - 5.1 mmol/L    Chloride 101 95 - 110 mmol/L    CO2 25 23 - 29 mmol/L    Glucose 136 (H) 70 - 110 mg/dL    BUN <3 (L) 8 - 23 mg/dL    Creatinine 0.8 0.5 - 1.4 mg/dL    Calcium 8.5 (L) 8.7 - 10.5 mg/dL    Anion Gap 8 8 - 16 mmol/L    eGFR >60.0 >60 mL/min/1.73 m^2   Magnesium    Collection Time: 12/26/22  6:00 AM   Result Value Ref Range    Magnesium 1.5 (L) 1.6 - 2.6 mg/dL   Phosphorus    Collection Time: 12/26/22  6:00 AM   Result Value Ref Range    Phosphorus 1.5 (L) 2.7 - 4.5 mg/dL   Basic Metabolic Panel (BMP)    Collection Time: 12/26/22  8:07 AM   Result Value Ref Range    Sodium 137 136 - 145 mmol/L    Potassium 3.3 (L) 3.5 - 5.1 mmol/L    Chloride 100 95 - 110 mmol/L    CO2 25 23 - 29 mmol/L    Glucose 109 70 - 110 mg/dL    BUN <3 (L) 8 - 23 mg/dL    Creatinine 0.7 0.5 - 1.4 mg/dL    Calcium 8.5 (L) 8.7 - 10.5 mg/dL    Anion Gap 12 8 - 16 mmol/L    eGFR >60.0 >60 mL/min/1.73 m^2   Comprehensive metabolic panel    Collection Time: 12/26/22 10:49 AM   Result Value Ref Range    Sodium 136 136 - 145 mmol/L    Potassium 3.5 3.5 - 5.1 mmol/L    Chloride 101 95 - 110 mmol/L    CO2 27 23 - 29 mmol/L    Glucose 159 (H) 70 - 110 mg/dL    BUN <3 (L) 8 - 23 mg/dL    Creatinine 0.8 0.5 - 1.4 mg/dL    Calcium 8.9 8.7 - 10.5 mg/dL    Total Protein 6.2 6.0 - 8.4 g/dL    Albumin 3.6 3.5 - 5.2 g/dL    Total Bilirubin 1.0 0.1 - 1.0 mg/dL    Alkaline Phosphatase 80 55 - 135 U/L     (H) 10 - 40 U/L    ALT 90 (H) 10 - 44 U/L    Anion Gap 8 8 - 16 mmol/L    eGFR >60.0 >60 mL/min/1.73 m^2   Basic Metabolic Panel (BMP)    Collection Time: 12/26/22  4:47 PM   Result Value Ref Range    Sodium  140 136 - 145 mmol/L    Potassium 3.9 3.5 - 5.1 mmol/L    Chloride 102 95 - 110 mmol/L    CO2 26 23 - 29 mmol/L    Glucose 136 (H) 70 - 110 mg/dL    BUN 3 (L) 8 - 23 mg/dL    Creatinine 0.8 0.5 - 1.4 mg/dL    Calcium 9.1 8.7 - 10.5 mg/dL    Anion Gap 12 8 - 16 mmol/L    eGFR >60.0 >60 mL/min/1.73 m^2     Imaging Results              US Liver with Doppler (xpd) (Final result)  Result time 12/23/22 10:49:34      Final result by Kevin Guerra MD (12/23/22 10:49:34)                   Impression:      Satisfactory Doppler evaluation of the liver.    Hepatosplenomegaly.    Stable splenic complex cystic lesion.    Electronically signed by resident: Alejandro Gardner  Date:    12/23/2022  Time:    09:55    Electronically signed by: Kevin Guerra MD  Date:    12/23/2022  Time:    10:49               Narrative:    EXAMINATION:  US LIVER WITH DOPPLER    CLINICAL HISTORY:  Alcohol abuse, here for ketoacidosis;    TECHNIQUE:  Complete abdominal ultrasound with doppler.  Color and spectral Doppler were performed.    COMPARISON:  CT 05/01/2022.    Ultrasound 04/17/2021.    FINDINGS:  Technically limited exam due to the patient breath-holding technique.    The visualized portion of the pancreas is unremarkable.    The liver is enlarged in size measuring 20.5 cm.  The liver demonstrates diffusely heterogeneous echotexture.  No focal hepatic lesions are seen.    The gallbladder is unremarkable with no evidence of calculi.  The common duct is not dilated, measuring 2 mm.  The gallbladder wall is not thickened.  There is no sonographic Mccollum sign.  No dilated intrahepatic radicles are seen.    The spleen is enlarged in size measuring 13.5 x 4.4 cm with a homogeneous echotexture.  There is a stable complex cystic lesion in the spleen.    There is no evidence of ascites.    Borderline enlarged portal vein measuring 15 mm.  The main portal vein, right portal vein, left portal vein, middle hepatic vein, right hepatic vein, left  hepatic vein, SMV, and IVC are patent with proper directional flow.  The main hepatic artery is patent. The umbilical vein is not patent.                                       X-Ray Chest AP Portable (Final result)  Result time 12/23/22 04:58:27      Final result by Aayush Hoffman MD (12/23/22 04:58:27)                   Impression:      No convincing radiographic evidence of acute intrathoracic process on this single view.      Electronically signed by: Aayush Hoffman MD  Date:    12/23/2022  Time:    04:58               Narrative:    EXAMINATION:  XR CHEST AP PORTABLE    CLINICAL HISTORY:  Hypoxemia    TECHNIQUE:  Single frontal view of the chest was performed.    COMPARISON:  Chest radiograph 09/26/2022, CT chest 03/25/2022    FINDINGS:  There is soft tissue attenuation relating to body habitus as well as bilateral breast prosthesis.  Cardiac silhouette is stable in size.  There is mild atherosclerotic calcification of the thoracic aorta.  Lung volumes are mildly diminished with bibasilar atelectasis.  No new large confluent airspace consolidation appreciated.  No significant volume of pleural fluid or pneumothorax appreciated.  Osseous structures demonstrate degenerative changes.

## 2022-12-27 NOTE — ASSESSMENT & PLAN NOTE
Patient takes Abilify and Cymbalta  - Will continue to hold  - Restarting home trazodone for sleep

## 2022-12-28 VITALS
HEIGHT: 66 IN | SYSTOLIC BLOOD PRESSURE: 156 MMHG | BODY MASS INDEX: 28.16 KG/M2 | RESPIRATION RATE: 16 BRPM | HEART RATE: 90 BPM | WEIGHT: 175.25 LBS | OXYGEN SATURATION: 94 % | TEMPERATURE: 98 F | DIASTOLIC BLOOD PRESSURE: 74 MMHG

## 2022-12-28 LAB
ANION GAP SERPL CALC-SCNC: 13 MMOL/L (ref 8–16)
BUN SERPL-MCNC: 6 MG/DL (ref 8–23)
CALCIUM SERPL-MCNC: 9 MG/DL (ref 8.7–10.5)
CHLORIDE SERPL-SCNC: 103 MMOL/L (ref 95–110)
CO2 SERPL-SCNC: 24 MMOL/L (ref 23–29)
CREAT SERPL-MCNC: 0.8 MG/DL (ref 0.5–1.4)
EST. GFR  (NO RACE VARIABLE): >60 ML/MIN/1.73 M^2
GLUCOSE SERPL-MCNC: 141 MG/DL (ref 70–110)
MAGNESIUM SERPL-MCNC: 1.5 MG/DL (ref 1.6–2.6)
PHOSPHATE SERPL-MCNC: 4.2 MG/DL (ref 2.7–4.5)
POCT GLUCOSE: 140 MG/DL (ref 70–110)
POCT GLUCOSE: 147 MG/DL (ref 70–110)
POTASSIUM SERPL-SCNC: 3.7 MMOL/L (ref 3.5–5.1)
SODIUM SERPL-SCNC: 140 MMOL/L (ref 136–145)

## 2022-12-28 PROCEDURE — 84100 ASSAY OF PHOSPHORUS: CPT | Performed by: HOSPITALIST

## 2022-12-28 PROCEDURE — 25000003 PHARM REV CODE 250

## 2022-12-28 PROCEDURE — 36415 COLL VENOUS BLD VENIPUNCTURE: CPT | Performed by: HOSPITALIST

## 2022-12-28 PROCEDURE — 99239 HOSP IP/OBS DSCHRG MGMT >30: CPT | Mod: ,,, | Performed by: HOSPITALIST

## 2022-12-28 PROCEDURE — 80048 BASIC METABOLIC PNL TOTAL CA: CPT | Performed by: HOSPITALIST

## 2022-12-28 PROCEDURE — 83735 ASSAY OF MAGNESIUM: CPT | Performed by: HOSPITALIST

## 2022-12-28 PROCEDURE — 99239 PR HOSPITAL DISCHARGE DAY,>30 MIN: ICD-10-PCS | Mod: ,,, | Performed by: HOSPITALIST

## 2022-12-28 PROCEDURE — 63600175 PHARM REV CODE 636 W HCPCS

## 2022-12-28 RX ORDER — LANOLIN ALCOHOL/MO/W.PET/CERES
800 CREAM (GRAM) TOPICAL EVERY 4 HOURS
Status: DISCONTINUED | OUTPATIENT
Start: 2022-12-28 | End: 2022-12-28 | Stop reason: HOSPADM

## 2022-12-28 RX ORDER — DIAZEPAM 10 MG/1
TABLET ORAL
Qty: 13 TABLET | Refills: 0 | Status: ON HOLD | OUTPATIENT
Start: 2022-12-28 | End: 2023-03-30 | Stop reason: HOSPADM

## 2022-12-28 RX ORDER — IBUPROFEN 200 MG
1 TABLET ORAL DAILY
Qty: 28 PATCH | Refills: 3 | Status: ON HOLD | OUTPATIENT
Start: 2022-12-29 | End: 2023-03-29

## 2022-12-28 RX ORDER — TALC
750 POWDER (GRAM) TOPICAL EVERY 4 HOURS
Status: DISCONTINUED | OUTPATIENT
Start: 2022-12-28 | End: 2022-12-28

## 2022-12-28 RX ORDER — ACETAMINOPHEN 325 MG/1
650 TABLET ORAL EVERY 6 HOURS PRN
Status: DISCONTINUED | OUTPATIENT
Start: 2022-12-28 | End: 2022-12-28 | Stop reason: HOSPADM

## 2022-12-28 RX ORDER — POTASSIUM CHLORIDE 20 MEQ/1
40 TABLET, EXTENDED RELEASE ORAL ONCE
Status: COMPLETED | OUTPATIENT
Start: 2022-12-28 | End: 2022-12-28

## 2022-12-28 RX ORDER — FOLIC ACID 1 MG/1
1 TABLET ORAL DAILY
Qty: 30 TABLET | Refills: 3 | Status: SHIPPED | OUTPATIENT
Start: 2022-12-29 | End: 2023-04-26

## 2022-12-28 RX ADMIN — DIAZEPAM 10 MG: 5 TABLET ORAL at 05:12

## 2022-12-28 RX ADMIN — FOLIC ACID 1 MG: 1 TABLET ORAL at 08:12

## 2022-12-28 RX ADMIN — DIAZEPAM 10 MG: 5 TABLET ORAL at 01:12

## 2022-12-28 RX ADMIN — THERA TABS 1 TABLET: TAB at 08:12

## 2022-12-28 RX ADMIN — ARIPIPRAZOLE 5 MG: 5 TABLET ORAL at 08:12

## 2022-12-28 RX ADMIN — Medication 800 MG: at 01:12

## 2022-12-28 RX ADMIN — LEVOTHYROXINE SODIUM 50 MCG: 50 TABLET ORAL at 05:12

## 2022-12-28 RX ADMIN — ACETAMINOPHEN 650 MG: 325 TABLET ORAL at 10:12

## 2022-12-28 RX ADMIN — LORAZEPAM 2 MG: 2 INJECTION INTRAMUSCULAR; INTRAVENOUS at 01:12

## 2022-12-28 RX ADMIN — PANTOPRAZOLE SODIUM 40 MG: 40 TABLET, DELAYED RELEASE ORAL at 05:12

## 2022-12-28 RX ADMIN — POTASSIUM CHLORIDE 40 MEQ: 1500 TABLET, EXTENDED RELEASE ORAL at 08:12

## 2022-12-28 RX ADMIN — THIAMINE HCL TAB 100 MG 100 MG: 100 TAB at 08:12

## 2022-12-28 RX ADMIN — DULOXETINE 60 MG: 60 CAPSULE, DELAYED RELEASE ORAL at 08:12

## 2022-12-28 NOTE — DISCHARGE INSTRUCTIONS
For your valium taper, please take in the following way:  Take 10mg every 8 hours today and tomorrow (12/29)  Take 10 mg every 12 hours on 12/30 and 12/31  Take 5mg every 12 hours on 1/1 and 1/2  Take 5 mg once a day on 1/3 and 1/4.

## 2022-12-28 NOTE — TREATMENT PLAN
GI Treatment Plan    Earl Abdul is a 62 y.o. female admitted to hospital 12/23/2022 (Hospital Day: 6) due to Alcoholic ketoacidosis.     Interval History  More awake this morning, still slightly tremulous  A&O x3  Denies any episodes of hematemesis in hospital or prior to admission    Objective  Temp:  [97.8 °F (36.6 °C)-99.8 °F (37.7 °C)] 98.5 °F (36.9 °C) (12/28 0739)  Pulse:  [] 97 (12/28 0739)  BP: (127-171)/(59-74) 135/64 (12/28 0739)  Resp:  [16-20] 17 (12/28 0739)  SpO2:  [92 %-96 %] 92 % (12/28 0739)    General: Alert, Oriented x3, no distress  Abdomen: Non-distended. Normal tympany. Soft. Non-tender. No peritoneal signs.    Laboratory    Recent Labs   Lab 12/25/22  0915 12/26/22  0600 12/27/22  0559   HGB 10.2* 10.7* 10.6*         Assessment and Plan    Heamtemesis  Alcoholic ketoacidosis  Withdrawal from alcohol  62-year-old with history of alcohol abuse presents with alcohol intoxications and recurrent nausea vomiting.     Patient's nausea vomiting likely related to alcohol abuse, no recurrent episodes   Given most recent ultrasound and clinical findings less concerned about cirrhosis.      EGD 4/2021; normal esophagus with gastritis   EGD 2/2020: small hiatal hernia. Gastritis.   C-scope 9/2020: unspecific colitis in ascending and sigmoid colon. Internal hemorrhoids.      PLAN:  -- Ok for regular diet  -- Hold off on inpatient EGD  -- Switch to PO PPI BID, continue for 8 weeks  -- Patient already follows with GI with Dr. Martino, prefers to follow-up with him. Likely warrants outpatient EGD at some point.    We will sign off at this time. Please contact GI with any questions or concerns.        Alan Perales MD  Gastroenterology Fellow, PGY IV

## 2022-12-28 NOTE — DISCHARGE SUMMARY
[facility] - Tele[facility] Medicine  Discharge Summary      Patient Name: Earl Abdul  MRN: [id number]  IZA: [id number]  Patient Class: IP- Inpatient  Admission Date: 12/23/2022  Hospital Length of Stay: 5 days  Discharge Date and Time:  12/28/2022 1:11 PM  Attending Physician: Elsa Camargo*   Discharging Provider: Fredrick Frederick MD  Primary Care Provider: Andrew Rodriguez MD  [facility] Medicine Team: [facility] [place] [facility] [place] Fredrick Frederick MD  Primary Care Team: [facility] MED 3    HPI:   Jason a 62-year-old female with a significant past medical history of alcohol use disorder with multiple hospitalizations and rehab attempts, CLL, breast cancer, T2DM, COPD, and suicide attempt presenting to the emergency department after roughly 4 days of persistent nausea and emesis.  She states she is been binge drinking for roughly 7 days, with about 1 bottle of vodka per day.  She also states due to her depression she is not been eating for a week as well.  Last drink was roughly 6-7 hours upon time of evaluation.  For her  she was recently in a rehab facility roughly 2 weeks ago.  She does have history of delirium tremens, and seizures due to alcohol withdrawals.  She denies any hallucinations, or seizure-like activity at this time.  She is however tremulous.  She denies any fevers, chills ,shortness of breath, or sweats. She further denies any abdominal tenderness. Endorses 1 time episode of bloody emesis which has not returned.  She states she stopped taking her regular medications roughly 5 days ago.    In the ED: Glucose was found to be in the 50s, She was given IV thiamine followed by D5 normal saline.  She was also given IV Ativan.        * No surgery found *      Hospital Course:   Admitted for alcohol withdrawals with prior hx of seizures as a result. Started on 10mg valium q8 increased to q6 per psychiatry recommendations. PRN ativan as needed for further withdrawals CIWA >8. Patient  became very agitated during admission, threatening to rip out IV lines and walk out the hospital. Patient was given IV haldol by team which helped with the agitation.  at bedside along with primary team. Patient expressed desire to go home to see grandchildren however is very noticeably tremulous during this meeting.  expresses safety concerns as well. Psych aware, and will give further recs. In evening, patient found to be increasingly agitated, pulling IV lines, disabling her bed alarm, getting out of bed and threatening to leave AMA. Given PRN ativan and Zyprexa. She had inconsistent train of thought, slurring her speech. She was further unable to demonstrate insight into her medical condition as well as what may happen if she were to leave. Pt was PEC'ed and benzodiazepine taper was continued and pt became more agreeable to care throughout hospitalization. Pt and  found inpatient rehab facility in [place] and will be discharged to finish taper as outpatient.  agreeable to monitoring patient and assisting with benzo taper prior to admission to rehab facility.       Goals of Care Treatment Preferences:  Code Status: Full Code      Consults:   Consults (From admission, onward)        Status Ordering Provider     Inpatient consult to Psychiatry  Once        Provider:  (Not yet assigned)    Completed JEFF MERAZ     Inpatient consult to Gastroenterology  Once        Provider:  (Not yet assigned)    Completed JEFF MERAZ     Inpatient consult to Registered Dietitian/Nutritionist  Once        Provider:  (Not yet assigned)    Completed OLMAN MURRY          Interval History: NAEON. Pt complained of not sleeping well last night, but she appears to have a better mood/affect this AM. She states that she feels ready for dc to get her affairs in order before going to rehab on Friday.  also agreeable to finish taper at home. Pt to be discharged home with benzo taper  today.    Review of Systems   Constitutional:  Negative for appetite change, chills and fever.   HENT:  Negative for ear pain and sinus pain.    Eyes:  Negative for pain and visual disturbance.   Respiratory:  Negative for chest tightness and shortness of breath.    Cardiovascular:  Negative for leg swelling.   Gastrointestinal:  Negative for abdominal pain, constipation, diarrhea, nausea and vomiting.   Genitourinary:  Negative for dysuria.   Musculoskeletal:  Negative for gait problem.   Neurological:  Positive for tremors. Negative for weakness and headaches.   Psychiatric/Behavioral:  Positive for sleep disturbance. Negative for agitation. The patient is not nervous/anxious.    Objective:     Vital Signs (Most Recent):  Temp: 98.1 °F (36.7 °C) (12/28/22 1136)  Pulse: 90 (12/28/22 1136)  Resp: 16 (12/28/22 1136)  BP: (!) 156/74 (12/28/22 1136)  SpO2: (!) 94 % (12/28/22 1136)   Vital Signs (24h Range):  Temp:  [98.1 °F (36.7 °C)-99.8 °F (37.7 °C)] 98.1 °F (36.7 °C)  Pulse:  [] 90  Resp:  [16-20] 16  SpO2:  [92 %-96 %] 94 %  BP: (131-171)/(59-74) 156/74     Weight: 79.5 kg (175 lb 4.3 oz)  Body mass index is 28.29 kg/m².    Intake/Output Summary (Last 24 hours) at 12/28/2022 1308  Last data filed at 12/28/2022 0829  Gross per 24 hour   Intake 360 ml   Output --   Net 360 ml      Physical Exam  Vitals and nursing note reviewed.   Constitutional:       General: She is not in acute distress.     Appearance: Normal appearance. She is not ill-appearing, toxic-appearing or diaphoretic.   HENT:      Head: Normocephalic and atraumatic.      Nose: Nose normal.      Mouth/Throat:      Mouth: Mucous membranes are moist.      Pharynx: Oropharynx is clear.   Eyes:      Extraocular Movements: Extraocular movements intact.   Cardiovascular:      Rate and Rhythm: Regular rhythm. Tachycardia present.   Pulmonary:      Effort: No respiratory distress.      Breath sounds: No wheezing.   Abdominal:      General: Abdomen is flat.  There is no distension.      Tenderness: There is no abdominal tenderness. There is no guarding.   Musculoskeletal:         General: No swelling or tenderness.      Right lower leg: No edema.      Left lower leg: No edema.   Skin:     Coloration: Skin is not jaundiced.      Findings: Bruising (Abdomen, nontender) present.   Neurological:      General: No focal deficit present.      Mental Status: She is alert and oriented to person, place, and time.      Motor: Tremor present.   Psychiatric:         Attention and Perception: She is attentive.         Mood and Affect: Mood and affect normal.         Behavior: Behavior normal. Behavior is cooperative.       Significant Labs: All pertinent labs within the past 24 hours have been reviewed.  CBC:   Recent Labs   Lab 12/27/22  0559   WBC 5.79   HGB 10.6*   HCT 36.0*   PLT 58*     CMP:   Recent Labs   Lab 12/27/22  0559 12/27/22  0918 12/28/22  0406    141 140   K 4.3 4.4 3.7    107 103   CO2 22* 24 24   * 172* 141*   BUN 3* 4* 6*   CREATININE 0.8 0.8 0.8   CALCIUM 9.2 9.0 9.0   ANIONGAP 12 10 13       Significant Imaging: I have reviewed all pertinent imaging results/findings within the past 24 hours.        No new Assessment & Plan notes have been filed under this hospital service since the last note was generated.  Service: Hospital Medicine    Final Active Diagnoses:    Diagnosis Date Noted POA    PRINCIPAL PROBLEM:  Alcoholic ketoacidosis [E87.29] 04/29/2022 Yes    Hypothyroidism [E03.9] 11/30/2021 Yes    Malignant neoplasm of central portion of right breast in female, estrogen receptor positive [C50.111, Z17.0] 11/18/2020 Not Applicable    Bloody emesis [K92.0] 10/25/2014 Unknown    Alcohol use disorder, severe, dependence [F10.20] 02/07/2014 Yes     Chronic    Anxiety and Depression [F32.A] 02/07/2014 Yes    Hyperlipidemia [E78.5] 11/30/2012 Yes      Problems Resolved During this Admission:       Discharged Condition: fair    Disposition:  Admitted as an Inpatient    Follow Up:    Patient Instructions:      Ambulatory referral/consult to Gastroenterology   Standing Status: Future   Referral Priority: Routine Referral Type: Consultation   Referral Reason: Specialty Services Required   Requested Specialty: Gastroenterology   Number of Visits Requested: 1       Significant Diagnostic Studies: Labs:   CMP   Recent Labs   Lab 12/27/22  0559 12/27/22  0918 12/28/22  0406    141 140   K 4.3 4.4 3.7    107 103   CO2 22* 24 24   * 172* 141*   BUN 3* 4* 6*   CREATININE 0.8 0.8 0.8   CALCIUM 9.2 9.0 9.0   ANIONGAP 12 10 13    and CBC   Recent Labs   Lab 12/27/22  0559   WBC 5.79   HGB 10.6*   HCT 36.0*   PLT 58*       Pending Diagnostic Studies:     Procedure Component Value Units Date/Time    Basic Metabolic Panel (BMP) [[id number]] Collected: 12/26/22 1049    Order Status: Sent Lab Status: In process Updated: 12/26/22 1049    Specimen: Blood     Basic Metabolic Panel (BMP) [[id number]] Collected: 12/24/22 1121    Order Status: Sent Lab Status: In process Updated: 12/24/22 1121    Specimen: Blood          Medications:  Reconciled Home Medications:      Medication List      START taking these medications    diazePAM 10 MG Tab  Commonly known as: VALIUM  Take 1 tablet (10 mg total) by mouth every 8 (eight) hours for 2 days, THEN 1 tablet (10 mg total) every 12 (twelve) hours for 2 days, THEN 0.5 tablet (5 mg total) every 12 (twelve) hours for 2 days, THEN 0.5 tablet (5 mg total) once daily for 2 days.     folic acid 1 MG tablet  Commonly known as: FOLVITE  Take 1 tablet (1 mg total) by mouth once daily.  Start taking on: December 29, 2022     nicotine 21 mg/24 hr  Commonly known as: NICODERM CQ  Place 1 patch onto the skin once daily.  Start taking on: December 29, 2022        CONTINUE taking these medications    acetaminophen 500 MG tablet  Commonly known as: TYLENOL  Take 1,000 mg by mouth every 4 (four) hours as needed for Pain.      anastrozole 1 mg Tab  Commonly known as: ARIMIDEX  Take 1 tablet (1 mg total) by mouth every morning.     ARIPiprazole 5 MG Tab  Commonly known as: ABILIFY  Take 5 mg by mouth once daily.     ATROVENT HFA 17 mcg/actuation inhaler  Generic drug: ipratropium  Inhale 2 puffs into the lungs every 6 (six) hours as needed for Wheezing.     DULoxetine 60 MG capsule  Commonly known as: CYMBALTA  Take 60 mg by mouth every evening.     gabapentin 600 MG tablet  Commonly known as: NEURONTIN  Take 1 tablet (600 mg total) by mouth 2 (two) times daily.     levothyroxine 50 MCG tablet  Commonly known as: SYNTHROID  Take 1 tablet (50 mcg total) by mouth before breakfast.     loperamide 2 mg capsule  Commonly known as: IMODIUM  Take 2 mg by mouth 4 (four) times daily as needed for Diarrhea (Per package directions).     metFORMIN 500 MG ER 24hr tablet  Commonly known as: GLUCOPHAGE-XR  Take 2 tablets (1,000 mg total) by mouth 2 (two) times daily with meals.     multivitamin per tablet  Commonly known as: THERAGRAN  Take 1 tablet by mouth once daily.     ondansetron 4 MG Tbdl  Commonly known as: ZOFRAN ODT  Take 1 tablet (4 mg total) by mouth every 6 (six) hours as needed (nausea).     pantoprazole 40 MG tablet  Commonly known as: PROTONIX  Take 1 tablet (40 mg total) by mouth once daily.     propranoloL 10 MG tablet  Commonly known as: INDERAL  Take 1 tablet (10 mg total) by mouth 2 (two) times daily. Hold medications until patient follows with PCP     thiamine 100 MG tablet  Take 1 tablet (100 mg total) by mouth once daily.     traZODone 300 MG tablet  Commonly known as: DESYREL  Take 1 tablet (300 mg total) by mouth every evening.        STOP taking these medications    divalproex 125 mg capsule  Commonly known as: DEPAKOTE SPRINKLE     predniSONE 10 MG tablet  Commonly known as: DELTASONE     topiramate 50 MG tablet  Commonly known as: TOPAMAX     VITAMIN B-1 (MONONITRATE) 100 mg Tab  Generic drug: thiamine mononitrate (vit  B1)            Indwelling Lines/Drains at time of discharge:   Lines/Drains/Airways     None                 Time spent on the discharge of patient: 40 minutes         Fredrick Frederick MD  Department of Hospital Medicine  [facility] - Telemetry Stepdown

## 2022-12-28 NOTE — PLAN OF CARE
Patient continues with 1:1 sitter @ bedside, no acute distress or changes, will continue to monitor.     Problem: Adult Inpatient Plan of Care  Goal: Plan of Care Review  Outcome: Ongoing, Progressing  Goal: Patient-Specific Goal (Individualized)  Outcome: Ongoing, Progressing  Goal: Absence of Hospital-Acquired Illness or Injury  Outcome: Ongoing, Progressing  Goal: Optimal Comfort and Wellbeing  Outcome: Ongoing, Progressing  Goal: Readiness for Transition of Care  Outcome: Ongoing, Progressing     Problem: Diabetic Ketoacidosis  Goal: Fluid and Electrolyte Balance with Absence of Ketosis  Outcome: Ongoing, Progressing     Problem: Diabetes Comorbidity  Goal: Blood Glucose Level Within Targeted Range  Outcome: Ongoing, Progressing     Problem: Adjustment to Illness (Sepsis/Septic Shock)  Goal: Optimal Coping  Outcome: Ongoing, Progressing     Problem: Bleeding (Sepsis/Septic Shock)  Goal: Absence of Bleeding  Outcome: Ongoing, Progressing     Problem: Glycemic Control Impaired (Sepsis/Septic Shock)  Goal: Blood Glucose Level Within Desired Range  Outcome: Ongoing, Progressing     Problem: Infection Progression (Sepsis/Septic Shock)  Goal: Absence of Infection Signs and Symptoms  Outcome: Ongoing, Progressing     Problem: Nutrition Impaired (Sepsis/Septic Shock)  Goal: Optimal Nutrition Intake  Outcome: Ongoing, Progressing

## 2022-12-28 NOTE — SUBJECTIVE & OBJECTIVE
Interval History: NAEON. Pt complained of not sleeping well last night, but she appears to have a better mood/affect this AM. She states that she feels ready for dc to get her affairs in order before going to rehab on Friday.  also agreeable to finish taper at home. Pt to be discharged home with benzo taper today.    Review of Systems   Constitutional:  Negative for appetite change, chills and fever.   HENT:  Negative for ear pain and sinus pain.    Eyes:  Negative for pain and visual disturbance.   Respiratory:  Negative for chest tightness and shortness of breath.    Cardiovascular:  Negative for leg swelling.   Gastrointestinal:  Negative for abdominal pain, constipation, diarrhea, nausea and vomiting.   Genitourinary:  Negative for dysuria.   Musculoskeletal:  Negative for gait problem.   Neurological:  Positive for tremors. Negative for weakness and headaches.   Psychiatric/Behavioral:  Positive for sleep disturbance. Negative for agitation. The patient is not nervous/anxious.    Objective:     Vital Signs (Most Recent):  Temp: 98.1 °F (36.7 °C) (12/28/22 1136)  Pulse: 90 (12/28/22 1136)  Resp: 16 (12/28/22 1136)  BP: (!) 156/74 (12/28/22 1136)  SpO2: (!) 94 % (12/28/22 1136)   Vital Signs (24h Range):  Temp:  [98.1 °F (36.7 °C)-99.8 °F (37.7 °C)] 98.1 °F (36.7 °C)  Pulse:  [] 90  Resp:  [16-20] 16  SpO2:  [92 %-96 %] 94 %  BP: (131-171)/(59-74) 156/74     Weight: 79.5 kg (175 lb 4.3 oz)  Body mass index is 28.29 kg/m².    Intake/Output Summary (Last 24 hours) at 12/28/2022 1308  Last data filed at 12/28/2022 0829  Gross per 24 hour   Intake 360 ml   Output --   Net 360 ml      Physical Exam  Vitals and nursing note reviewed.   Constitutional:       General: She is not in acute distress.     Appearance: Normal appearance. She is not ill-appearing, toxic-appearing or diaphoretic.   HENT:      Head: Normocephalic and atraumatic.      Nose: Nose normal.      Mouth/Throat:      Mouth: Mucous membranes  are moist.      Pharynx: Oropharynx is clear.   Eyes:      Extraocular Movements: Extraocular movements intact.   Cardiovascular:      Rate and Rhythm: Regular rhythm. Tachycardia present.   Pulmonary:      Effort: No respiratory distress.      Breath sounds: No wheezing.   Abdominal:      General: Abdomen is flat. There is no distension.      Tenderness: There is no abdominal tenderness. There is no guarding.   Musculoskeletal:         General: No swelling or tenderness.      Right lower leg: No edema.      Left lower leg: No edema.   Skin:     Coloration: Skin is not jaundiced.      Findings: Bruising (Abdomen, nontender) present.   Neurological:      General: No focal deficit present.      Mental Status: She is alert and oriented to person, place, and time.      Motor: Tremor present.   Psychiatric:         Attention and Perception: She is attentive.         Mood and Affect: Mood and affect normal.         Behavior: Behavior normal. Behavior is cooperative.       Significant Labs: All pertinent labs within the past 24 hours have been reviewed.  CBC:   Recent Labs   Lab 12/27/22  0559   WBC 5.79   HGB 10.6*   HCT 36.0*   PLT 58*     CMP:   Recent Labs   Lab 12/27/22  0559 12/27/22  0918 12/28/22  0406    141 140   K 4.3 4.4 3.7    107 103   CO2 22* 24 24   * 172* 141*   BUN 3* 4* 6*   CREATININE 0.8 0.8 0.8   CALCIUM 9.2 9.0 9.0   ANIONGAP 12 10 13       Significant Imaging: I have reviewed all pertinent imaging results/findings within the past 24 hours.

## 2022-12-28 NOTE — NURSING
Discharge orders received. Patient and  given discharge instructions including follow up appointments and home medications. Questions answered. No PIV present to remove. Pt's  transporting patient home. They have decided to  prescriptions from [facility] on their way out. All belongings sent home with patient.

## 2022-12-28 NOTE — PLAN OF CARE
[facility] - Telemetry Stepdown  Discharge Final Note    Primary Care Provider: Andrew Rodriguez MD    Expected Discharge Date: 12/28/2022    Final Discharge Note (most recent)       Final Note - 12/28/22 1219          Final Note    Assessment Type Final Discharge Note     Anticipated Discharge Disposition Home or Self Care        Post-Acute Status    Post-Acute Authorization Other     Other Status Community Services   Offered and provided [place]  rehab list, however, spouse reports plan for patient to admit to [facility] in [place].    Discharge Delays None known at this time                 Cristy German, DERRELL  [facility]  Ext. [phone]

## 2023-01-02 PROBLEM — J96.11 CHRONIC HYPOXEMIC RESPIRATORY FAILURE: Chronic | Status: RESOLVED | Noted: 2022-01-08 | Resolved: 2023-01-02

## 2023-01-19 ENCOUNTER — TELEPHONE (OUTPATIENT)
Dept: INTERNAL MEDICINE | Facility: CLINIC | Age: 63
End: 2023-01-19
Payer: COMMERCIAL

## 2023-01-19 NOTE — TELEPHONE ENCOUNTER
Canceled prior authorization through cover my meds due to patient no longer needing PA for medication. Patient was changed from medication Trulicity to Ozempic on 3/13//22. Thanks    KEY [id number]     Discontinued - dulaglutide (TRULICITY) 1.5 mg/0.5 mL pen injector  Inject 1.5 mg into the skin every 7 days., Starting Tue 1/25/2022, Until Thu 3/10/2022, Normal   Dispense: 12 pen   Refills: 0 ordered   Pharmacy: [id number] - [place], [place] - [address] AT [address] [address] (Ph: [phone])

## 2023-01-27 ENCOUNTER — PATIENT MESSAGE (OUTPATIENT)
Dept: RESEARCH | Facility: HOSPITAL | Age: 63
End: 2023-01-27
Payer: COMMERCIAL

## 2023-02-24 ENCOUNTER — PATIENT MESSAGE (OUTPATIENT)
Dept: INTERNAL MEDICINE | Facility: CLINIC | Age: 63
End: 2023-02-24
Payer: COMMERCIAL

## 2023-03-02 ENCOUNTER — PATIENT MESSAGE (OUTPATIENT)
Dept: INTERNAL MEDICINE | Facility: CLINIC | Age: 63
End: 2023-03-02
Payer: COMMERCIAL

## 2023-03-03 ENCOUNTER — PATIENT MESSAGE (OUTPATIENT)
Dept: INTERNAL MEDICINE | Facility: CLINIC | Age: 63
End: 2023-03-03
Payer: COMMERCIAL

## 2023-03-03 RX ORDER — AMLODIPINE BESYLATE 5 MG/1
5 TABLET ORAL DAILY
Qty: 90 TABLET | Refills: 0 | Status: ON HOLD | OUTPATIENT
Start: 2023-03-03 | End: 2023-03-30 | Stop reason: HOSPADM

## 2023-03-06 ENCOUNTER — HOSPITAL ENCOUNTER (OUTPATIENT)
Dept: RADIOLOGY | Facility: HOSPITAL | Age: 63
Discharge: HOME | End: 2023-03-06
Attending: ORTHOPAEDIC SURGERY
Payer: COMMERCIAL

## 2023-03-06 DIAGNOSIS — M25.561 RIGHT KNEE PAIN, UNSPECIFIED CHRONICITY: ICD-10-CM

## 2023-03-06 PROCEDURE — 73564 X-RAY EXAM KNEE 4 OR MORE: CPT | Mod: TC,50

## 2023-03-06 PROCEDURE — 73564 X-RAY EXAM KNEE 4 OR MORE: CPT | Mod: 26,50,, | Performed by: RADIOLOGY

## 2023-03-06 PROCEDURE — 73564 XR KNEE ORTHO BILAT WITH FLEXION: ICD-10-PCS | Mod: 26,50,, | Performed by: RADIOLOGY

## 2023-03-06 NOTE — ASSESSMENT & PLAN NOTE
Patient tested positive for covid. Is at a higher 02 requirement then at baseline    -Remdesevir Dexamethasone  -Supportive care  -Daily CBC  -Home monitoring at discharge     PAST SURGICAL HISTORY:  History of arthroscopy of left shoulder      PAST SURGICAL HISTORY:  History of arthroscopy of right shoulder rotator cuff

## 2023-03-07 ENCOUNTER — PATIENT OUTREACH (OUTPATIENT)
Dept: ADMINISTRATIVE | Facility: HOSPITAL | Age: 63
End: 2023-03-07
Payer: COMMERCIAL

## 2023-03-07 ENCOUNTER — PATIENT MESSAGE (OUTPATIENT)
Dept: ADMINISTRATIVE | Facility: HOSPITAL | Age: 63
End: 2023-03-07
Payer: COMMERCIAL

## 2023-03-07 DIAGNOSIS — E11.9 DIABETES MELLITUS WITHOUT COMPLICATION: Primary | ICD-10-CM

## 2023-03-09 VITALS — DIASTOLIC BLOOD PRESSURE: 99 MMHG | SYSTOLIC BLOOD PRESSURE: 153 MMHG

## 2023-03-09 DIAGNOSIS — Z87.891 PERSONAL HISTORY OF NICOTINE DEPENDENCE: Primary | ICD-10-CM

## 2023-03-10 ENCOUNTER — HOSPITAL ENCOUNTER (EMERGENCY)
Facility: HOSPITAL | Age: 63
Discharge: HOME | End: 2023-03-10
Attending: EMERGENCY MEDICINE
Payer: COMMERCIAL

## 2023-03-10 VITALS
RESPIRATION RATE: 21 BRPM | OXYGEN SATURATION: 95 % | HEART RATE: 90 BPM | TEMPERATURE: 99 F | WEIGHT: 177 LBS | BODY MASS INDEX: 28.45 KG/M2 | SYSTOLIC BLOOD PRESSURE: 125 MMHG | DIASTOLIC BLOOD PRESSURE: 62 MMHG | HEIGHT: 66 IN

## 2023-03-10 DIAGNOSIS — F10.10 ALCOHOL ABUSE: ICD-10-CM

## 2023-03-10 DIAGNOSIS — J02.9 PHARYNGITIS, UNSPECIFIED ETIOLOGY: Primary | ICD-10-CM

## 2023-03-10 DIAGNOSIS — Z92.89 S/P ALCOHOL DETOXIFICATION: ICD-10-CM

## 2023-03-10 LAB
ALBUMIN SERPL BCP-MCNC: 4.5 G/DL (ref 3.5–5.2)
ALP SERPL-CCNC: 70 U/L (ref 55–135)
ALT SERPL W/O P-5'-P-CCNC: 39 U/L (ref 10–44)
ANION GAP SERPL CALC-SCNC: 11 MMOL/L (ref 8–16)
ANISOCYTOSIS BLD QL SMEAR: SLIGHT
AST SERPL-CCNC: 46 U/L (ref 10–40)
BASOPHILS NFR BLD: 0 % (ref 0–1.9)
BILIRUB SERPL-MCNC: 0.9 MG/DL (ref 0.1–1)
BUN SERPL-MCNC: 28 MG/DL (ref 8–23)
CALCIUM SERPL-MCNC: 10.5 MG/DL (ref 8.7–10.5)
CHLORIDE SERPL-SCNC: 92 MMOL/L (ref 95–110)
CO2 SERPL-SCNC: 29 MMOL/L (ref 23–29)
CREAT SERPL-MCNC: 0.9 MG/DL (ref 0.5–1.4)
DIFFERENTIAL METHOD: ABNORMAL
EOSINOPHIL NFR BLD: 0 % (ref 0–8)
ERYTHROCYTE [DISTWIDTH] IN BLOOD BY AUTOMATED COUNT: 16 % (ref 11.5–14.5)
EST. GFR  (NO RACE VARIABLE): >60 ML/MIN/1.73 M^2
GLUCOSE SERPL-MCNC: 126 MG/DL (ref 70–110)
HCT VFR BLD AUTO: 40.4 % (ref 37–48.5)
HCV AB SERPL QL IA: NORMAL
HGB BLD-MCNC: 12.9 G/DL (ref 12–16)
HIV 1+2 AB+HIV1 P24 AG SERPL QL IA: NORMAL
IMM GRANULOCYTES # BLD AUTO: ABNORMAL K/UL (ref 0–0.04)
IMM GRANULOCYTES NFR BLD AUTO: ABNORMAL % (ref 0–0.5)
LIPASE SERPL-CCNC: 17 U/L (ref 4–60)
LYMPHOCYTES NFR BLD: 61 % (ref 18–48)
MAGNESIUM SERPL-MCNC: 2.3 MG/DL (ref 1.6–2.6)
MCH RBC QN AUTO: 27.3 PG (ref 27–31)
MCHC RBC AUTO-ENTMCNC: 31.9 G/DL (ref 32–36)
MCV RBC AUTO: 85 FL (ref 82–98)
MONOCYTES NFR BLD: 5 % (ref 4–15)
NEUTROPHILS NFR BLD: 32 % (ref 38–73)
NEUTS BAND NFR BLD MANUAL: 2 %
NRBC BLD-RTO: 0 /100 WBC
PLATELET # BLD AUTO: 147 K/UL (ref 150–450)
PLATELET BLD QL SMEAR: ABNORMAL
PMV BLD AUTO: 10 FL (ref 9.2–12.9)
POTASSIUM SERPL-SCNC: 4.3 MMOL/L (ref 3.5–5.1)
PROT SERPL-MCNC: 7.8 G/DL (ref 6–8.4)
RBC # BLD AUTO: 4.73 M/UL (ref 4–5.4)
SMUDGE CELLS BLD QL SMEAR: PRESENT
SODIUM SERPL-SCNC: 132 MMOL/L (ref 136–145)
WBC # BLD AUTO: 15.84 K/UL (ref 3.9–12.7)

## 2023-03-10 PROCEDURE — 86803 HEPATITIS C AB TEST: CPT | Performed by: PHYSICIAN ASSISTANT

## 2023-03-10 PROCEDURE — 85027 COMPLETE CBC AUTOMATED: CPT | Performed by: EMERGENCY MEDICINE

## 2023-03-10 PROCEDURE — 93010 ELECTROCARDIOGRAM REPORT: CPT | Mod: ,,, | Performed by: INTERNAL MEDICINE

## 2023-03-10 PROCEDURE — 99284 PR EMERGENCY DEPT VISIT,LEVEL IV: ICD-10-PCS | Mod: ,,, | Performed by: EMERGENCY MEDICINE

## 2023-03-10 PROCEDURE — 99284 EMERGENCY DEPT VISIT MOD MDM: CPT | Mod: 25

## 2023-03-10 PROCEDURE — 96361 HYDRATE IV INFUSION ADD-ON: CPT

## 2023-03-10 PROCEDURE — 93010 EKG 12-LEAD: ICD-10-PCS | Mod: ,,, | Performed by: INTERNAL MEDICINE

## 2023-03-10 PROCEDURE — 83735 ASSAY OF MAGNESIUM: CPT | Performed by: EMERGENCY MEDICINE

## 2023-03-10 PROCEDURE — 93005 ELECTROCARDIOGRAM TRACING: CPT

## 2023-03-10 PROCEDURE — 83690 ASSAY OF LIPASE: CPT | Performed by: EMERGENCY MEDICINE

## 2023-03-10 PROCEDURE — 63600175 PHARM REV CODE 636 W HCPCS: Performed by: EMERGENCY MEDICINE

## 2023-03-10 PROCEDURE — 80053 COMPREHEN METABOLIC PANEL: CPT | Performed by: EMERGENCY MEDICINE

## 2023-03-10 PROCEDURE — 25000003 PHARM REV CODE 250: Performed by: EMERGENCY MEDICINE

## 2023-03-10 PROCEDURE — 96374 THER/PROPH/DIAG INJ IV PUSH: CPT

## 2023-03-10 PROCEDURE — 85007 BL SMEAR W/DIFF WBC COUNT: CPT | Performed by: EMERGENCY MEDICINE

## 2023-03-10 PROCEDURE — 99284 EMERGENCY DEPT VISIT MOD MDM: CPT | Mod: ,,, | Performed by: EMERGENCY MEDICINE

## 2023-03-10 PROCEDURE — 87389 HIV-1 AG W/HIV-1&-2 AB AG IA: CPT | Performed by: PHYSICIAN ASSISTANT

## 2023-03-10 RX ORDER — MAG HYDROX/ALUMINUM HYD/SIMETH 200-200-20
30 SUSPENSION, ORAL (FINAL DOSE FORM) ORAL
Status: COMPLETED | OUTPATIENT
Start: 2023-03-10 | End: 2023-03-10

## 2023-03-10 RX ORDER — PANTOPRAZOLE SODIUM 40 MG/1
40 TABLET, DELAYED RELEASE ORAL EVERY MORNING
Qty: 30 TABLET | Refills: 0 | Status: SHIPPED | OUTPATIENT
Start: 2023-03-10 | End: 2023-05-15

## 2023-03-10 RX ORDER — ONDANSETRON 2 MG/ML
4 INJECTION INTRAMUSCULAR; INTRAVENOUS
Status: COMPLETED | OUTPATIENT
Start: 2023-03-10 | End: 2023-03-10

## 2023-03-10 RX ORDER — SUCRALFATE 1 G/10ML
1 SUSPENSION ORAL
Status: COMPLETED | OUTPATIENT
Start: 2023-03-10 | End: 2023-03-10

## 2023-03-10 RX ORDER — ONDANSETRON 4 MG/1
4 TABLET, ORALLY DISINTEGRATING ORAL EVERY 6 HOURS PRN
Qty: 20 TABLET | Refills: 0 | Status: ON HOLD | OUTPATIENT
Start: 2023-03-10 | End: 2023-05-21 | Stop reason: SDUPTHER

## 2023-03-10 RX ORDER — SUCRALFATE 1 G/10ML
1 SUSPENSION ORAL
Qty: 141 ML | Refills: 1 | Status: ON HOLD | OUTPATIENT
Start: 2023-03-10 | End: 2023-03-29

## 2023-03-10 RX ADMIN — ONDANSETRON 4 MG: 2 INJECTION INTRAMUSCULAR; INTRAVENOUS at 11:03

## 2023-03-10 RX ADMIN — ALUMINUM HYDROXIDE, MAGNESIUM HYDROXIDE, AND SIMETHICONE 30 ML: 200; 200; 20 SUSPENSION ORAL at 10:03

## 2023-03-10 RX ADMIN — SODIUM CHLORIDE 1000 ML: 9 INJECTION, SOLUTION INTRAVENOUS at 11:03

## 2023-03-10 RX ADMIN — SUCRALFATE 1 G: 1 SUSPENSION ORAL at 12:03

## 2023-03-10 NOTE — ED NOTES
Pt ambulatory to restroom, gait strong/balanced.  Pt states abdominal pain and generalized weakness improving

## 2023-03-10 NOTE — ED PROVIDER NOTES
Encounter Date: 3/10/2023       History     Chief Complaint   Patient presents with    Alcohol Problem     Pt reports alcohol detox x1 week. Endorses weakness, nausea, vomiting, diarrhea. States unable to tolerate anything PO.     62-year-old female with a history of alcoholism presents with nausea, vomiting, diarrhea, and decreased eating.  She stopped drinking a week ago.  She is been to rehab several times.  She relapsed after the last rehab stent.  She is had tremor, but it is improved today.  Patient denies ever, cough, shortness of breath, chest pain, abdominal pain, or dysuria.  The patients available PMH, PSH, Social History, medications, allergies, and triage vital signs were reviewed just prior to their medical evaluation.    Review of patient's allergies indicates:   Allergen Reactions    Lortab [hydrocodone-acetaminophen] Itching    Promethazine Itching and Other (See Comments)     Past Medical History:   Diagnosis Date    Anemia     Anemia     Controlled type 2 diabetes mellitus without complication, without long-term current use of insulin 11/30/2021    COPD (chronic obstructive pulmonary disease)     Depression     Diverticulitis     Fatty liver     GERD (gastroesophageal reflux disease)     Hyperlipidemia     Hypertension     Pancreatitis     Peptic ulcer disease     Polysubstance abuse     Posterior reversible encephalopathy syndrome     Sarcoidosis of lung     Sarcoidosis of lung     over 30 yrs ago    Seizures     7/2017    Suicide attempt     Suicide ideation      Past Surgical History:   Procedure Laterality Date    APPENDECTOMY      BILATERAL MASTECTOMY Bilateral 10/29/2020    Procedure: MASTECTOMY, BILATERAL;  Surgeon: Baylee Kevin MD;  Location: [facility] OR [address] [place] [address];  Service: General;  Laterality: Bilateral;    BREAST REVISION SURGERY Bilateral 2/11/2021    Procedure: BREAST REVISION SURGERY;  Surgeon: Scottie Johnson MD;  Location: [facility] OR [address] [place] [address];  Service: Plastics;  Laterality:  Bilateral;    COLONOSCOPY N/A 7/28/2017    Procedure: COLONOSCOPY;  Surgeon: Aaron Alvarado MD;  Location: [facility] ENDO;  Service: Endoscopy;  Laterality: N/A;    ESOPHAGOGASTRODUODENOSCOPY  10/7/2016, 11/6/2014    2016 - gastritis, duodenitis, 2014 erosive gastritis    ESOPHAGOGASTRODUODENOSCOPY N/A 2/11/2020    Procedure: ESOPHAGOGASTRODUODENOSCOPY (EGD);  Surgeon: Fawn Garrido MD;  Location: [facility] ENDO;  Service: Endoscopy;  Laterality: N/A;    ESOPHAGOGASTRODUODENOSCOPY N/A 4/19/2021    Procedure: EGD (ESOPHAGOGASTRODUODENOSCOPY);  Surgeon: Paramjit Martino MD;  Location: [facility] ENDO;  Service: Endoscopy;  Laterality: N/A;    FLEXIBLE SIGMOIDOSCOPY  11/06/2014    colitis    HYSTERECTOMY      IMPLANTATION OF PERMANENT SACRAL NERVE STIMULATOR N/A 7/12/2022    Procedure: INSERTION, NEUROSTIMULATOR, PERMANENT, SACRAL;  Surgeon: Juaquin Edwards MD;  Location: [facility] OR [address] [place] [address];  Service: Urology;  Laterality: N/A;  1hr    INJECTION FOR SENTINEL NODE IDENTIFICATION Right 10/29/2020    Procedure: INJECTION, FOR SENTINEL NODE IDENTIFICATION;  Surgeon: Baylee Kevin MD;  Location: [facility] OR [address] [place] [address];  Service: General;  Laterality: Right;    INJECTION OF JOINT Right 10/10/2019    Procedure: Injection, Joint RIGHT ILIOPSOAS BURSA/TENDON INJECTION AND RIGHT GLUTEAL TENDON INJECTION WITH STEROID AND LIDOCAINE;  Surgeon: Guillaume Rico MD;  Location: [facility];  Service: Pain Management;  Laterality: Right;  NEEDS CONSENT    INSERTION OF BREAST TISSUE EXPANDER Bilateral 10/29/2020    Procedure: INSERTION, TISSUE EXPANDER, BREAST;  Surgeon: Scottie Johnson MD;  [facility];  Service: Plastics;  Laterality: Bilateral;  Right breast: 1082 g  Left breast: 1076 g    LIPOSUCTION Bilateral 2/11/2021    Procedure: LIPOSUCTION;  Surgeon: Scottie Johnson MD;  [facility];  Service: Plastics;  Laterality: Bilateral;    mediastenoscopy      REPLACEMENT OF IMPLANT OF BREAST Bilateral  2021    Procedure: REPLACEMENT, IMPLANT, BREAST;  Surgeon: Scottie Johnson MD;  [facility];  Service: Plastics;  Laterality: Bilateral;    SENTINEL LYMPH NODE BIOPSY Right 10/29/2020    Procedure: BIOPSY, LYMPH NODE, SENTINEL;  Surgeon: Baylee Kevin MD;  Location: [facility] OR 2ND FLR;  Service: General;  Laterality: Right;    TONSILLECTOMY N/A 1970    TUBAL LIGATION       Family History   Problem Relation Age of Onset    Heart attack Father     Diabetes Father     Hypertension Father     Diabetes Mother     Hypertension Mother     Breast cancer Maternal Aunt     Colon cancer Maternal Uncle     Breast cancer Daughter     Ovarian cancer Neg Hx     Cancer Neg Hx      Social History     Tobacco Use    Smoking status: Former     Packs/day: 0.50     Years: 30.00     Pack years: 15.00     Types: Vaping with nicotine, Cigarettes     Quit date: 2021     Years since quittin.1    Smokeless tobacco: Never   Substance Use Topics    Alcohol use: Yes     Comment: vodka daily (half a regular bottle)    Drug use: Yes     Types: Marijuana     Comment: gummies     Review of Systems   Constitutional:  Positive for appetite change. Negative for fever.   Respiratory:  Negative for cough and shortness of breath.    Cardiovascular:  Negative for chest pain.   Gastrointestinal:  Positive for diarrhea, nausea and vomiting. Negative for abdominal pain.   Genitourinary:  Negative for dysuria.   Neurological:  Positive for tremors. Negative for seizures.     Physical Exam     Initial Vitals [03/10/23 0919]   BP Pulse Resp Temp SpO2   134/84 (!) 113 18 99 °F (37.2 °C) (!) 92 %      MAP       --         Physical Exam    Nursing note and vitals reviewed.  Constitutional: She appears well-developed and well-nourished. She is not diaphoretic. No distress.   HENT:   Head: Normocephalic and atraumatic.   Nose: Nose normal.   Inflammed pharyngeal tissue   Eyes: Conjunctivae are normal. Right eye exhibits no discharge.  Left eye exhibits no discharge.   Neck: Neck supple.   Normal range of motion.  Cardiovascular:  Normal rate, regular rhythm and normal heart sounds.     Exam reveals no gallop and no friction rub.       No murmur heard.  Pulmonary/Chest: Breath sounds normal. No respiratory distress. She has no wheezes. She has no rhonchi. She has no rales.   Abdominal: Abdomen is soft. She exhibits no distension. There is no abdominal tenderness. There is no rebound and no guarding.   Musculoskeletal:         General: No tenderness or edema. Normal range of motion.      Cervical back: Normal range of motion and neck supple.     Neurological: She is alert and oriented to person, place, and time. She has normal strength. GCS score is 15. GCS eye subscore is 4. GCS verbal subscore is 5. GCS motor subscore is 6.   Minimal tremor   Skin: Skin is warm and dry. No rash noted. No erythema.   Psychiatric: She has a normal mood and affect. Her behavior is normal. Judgment and thought content normal.       ED Course   Procedures  Labs Reviewed   CBC W/ AUTO DIFFERENTIAL - Abnormal; Notable for the following components:       Result Value    WBC 15.84 (*)     MCHC 31.9 (*)     RDW 16.0 (*)     Platelets 147 (*)     Gran % 32.0 (*)     Lymph % 61.0 (*)     All other components within normal limits   COMPREHENSIVE METABOLIC PANEL - Abnormal; Notable for the following components:    Sodium 132 (*)     Chloride 92 (*)     Glucose 126 (*)     BUN 28 (*)     AST 46 (*)     All other components within normal limits   HIV 1 / 2 ANTIBODY    Narrative:     Release to patient->Immediate   HEPATITIS C ANTIBODY    Narrative:     Release to patient->Immediate   LIPASE   MAGNESIUM     EKG Readings: (Independently Interpreted)   Initial Reading: No STEMI. Rhythm: Normal Sinus Rhythm. Heart Rate: 100. Ectopy: PACs. Conduction: Normal. ST Segments: Normal ST Segments. T Waves: Normal.     Imaging Results    None          Medications   sodium chloride 0.9%  bolus 1,000 mL 1,000 mL (0 mLs Intravenous Stopped 3/10/23 1224)   ondansetron injection 4 mg (4 mg Intravenous Given 3/10/23 1117)   aluminum-magnesium hydroxide-simethicone 200-200-20 mg/5 mL suspension 30 mL (30 mLs Oral Given 3/10/23 1028)   sucralfate 100 mg/mL suspension 1 g (1 g Oral Given 3/10/23 1212)     Medical Decision Making:   History:   I obtained history from: someone other than patient.       <> Summary of History: Family assists HPI  Old Medical Records: I decided to obtain old medical records.  Old Records Summarized: records from another hospital.       <> Summary of Records: Meds from rehab DC summary  Clinical Tests:   Lab Tests: Ordered and Reviewed  ED Management:  62-year-old female presents with nausea, vomiting, and diarrhea after quitting alcohol.  Vitals with tachycardia.  Physical exam as above.  Labs unremarkable.  WBC elevation is nonspecific.  Improved with oral meds and fluids.  Doubt life-threatening alcohol withdrawal to include delirium tremens.  Able to tolerate p.o. with pain.  Will discharge home with Carafate, Protonix, and Zofran.  She will do a liquid diet and advanced as tolerated.  She has outpatient IOP f/up for addiction.  Patient will return to ED for worsening symptoms, inability to eat/drink, fever greater than 100.4, or any other concerns. Did bedside teaching with return precautions.  All questions answered.  The patient acknowledges understanding.  Gave verbal discharge instructions.                         Clinical Impression:   Final diagnoses:  [Z92.89] S/P alcohol detoxification  [F10.10] Alcohol abuse  [J02.9] Pharyngitis, unspecified etiology (Primary)        ED Disposition Condition    Discharge Stable          ED Prescriptions       Medication Sig Dispense Start Date End Date Auth. Provider    sucralfate (CARAFATE) 100 mg/mL suspension Take 10 mLs (1 g total) by mouth 4 (four) times daily before meals and nightly. 141 mL 3/10/2023 -- Andrew Hassan MD     pantoprazole (PROTONIX) 40 MG tablet Take 1 tablet (40 mg total) by mouth every morning. 30 tablet 3/10/2023 4/9/2023 Andrew Hassan MD    ondansetron (ZOFRAN ODT) 4 MG TbDL Take 1 tablet (4 mg total) by mouth every 6 (six) hours as needed (nausea). 20 tablet 3/10/2023 -- Andrew Hassan MD          Follow-up Information       Follow up With Specialties Details Why Contact Info    Follow up with primary physician as soon as possible.  Call tomorrow for an appointment.        [facility] Emergency Medicine  Return to ED for worsening symptoms, inability to eat/drink, fever greater than 100.4, or any other concerns. [address]  [facility] [address] [phone]             Andrew Hassan MD  03/10/23 6869

## 2023-03-10 NOTE — ED NOTES
Pt ambulatory to ED for nausea/vomiting, sore throat, decreased appetite and poor PO intake, generalized weakness, trembling x1 week. PMHx of ETOH abuse, finished Tx 1 week ago.  Pt endorses RUQ abd pain, radiating to back, abdominal cramping and throat/esophagus pain. Pt RR even/unlabored, SPO2 97% on 2L per NC, pt wears NC 2L at home.  Pt A/Ox4, skin warm/dry, afebrile.  VSS. CIWA score 5.  Bed low/locked, siderails upx2, pt agrees to plan of care.

## 2023-03-13 ENCOUNTER — HOSPITAL ENCOUNTER (OUTPATIENT)
Facility: HOSPITAL | Age: 63
Discharge: LEFT BEFORE DISCHARGE | End: 2023-03-13
Attending: EMERGENCY MEDICINE | Admitting: STUDENT IN AN ORGANIZED HEALTH CARE EDUCATION/TRAINING PROGRAM
Payer: COMMERCIAL

## 2023-03-13 VITALS
RESPIRATION RATE: 16 BRPM | SYSTOLIC BLOOD PRESSURE: 150 MMHG | TEMPERATURE: 98 F | OXYGEN SATURATION: 98 % | WEIGHT: 175 LBS | HEART RATE: 82 BPM | BODY MASS INDEX: 28.12 KG/M2 | DIASTOLIC BLOOD PRESSURE: 72 MMHG | HEIGHT: 66 IN

## 2023-03-13 DIAGNOSIS — R13.10 DYSPHAGIA: ICD-10-CM

## 2023-03-13 DIAGNOSIS — K20.90 ESOPHAGITIS: ICD-10-CM

## 2023-03-13 DIAGNOSIS — E87.5 HYPERKALEMIA: Primary | ICD-10-CM

## 2023-03-13 DIAGNOSIS — R07.9 CHEST PAIN: ICD-10-CM

## 2023-03-13 LAB
ALBUMIN SERPL BCP-MCNC: 4.2 G/DL (ref 3.5–5.2)
ALP SERPL-CCNC: 59 U/L (ref 55–135)
ALT SERPL W/O P-5'-P-CCNC: 36 U/L (ref 10–44)
ANION GAP SERPL CALC-SCNC: 16 MMOL/L (ref 8–16)
AST SERPL-CCNC: 44 U/L (ref 10–40)
BASOPHILS # BLD AUTO: 0.04 K/UL (ref 0–0.2)
BASOPHILS NFR BLD: 0.3 % (ref 0–1.9)
BILIRUB SERPL-MCNC: 0.5 MG/DL (ref 0.1–1)
BUN SERPL-MCNC: 14 MG/DL (ref 8–23)
BUN SERPL-MCNC: 19 MG/DL (ref 6–30)
CALCIUM SERPL-MCNC: 10.1 MG/DL (ref 8.7–10.5)
CHLORIDE SERPL-SCNC: 97 MMOL/L (ref 95–110)
CHLORIDE SERPL-SCNC: 99 MMOL/L (ref 95–110)
CO2 SERPL-SCNC: 22 MMOL/L (ref 23–29)
CREAT SERPL-MCNC: 0.8 MG/DL (ref 0.5–1.4)
CREAT SERPL-MCNC: 0.9 MG/DL (ref 0.5–1.4)
DIFFERENTIAL METHOD: ABNORMAL
EOSINOPHIL # BLD AUTO: 0.1 K/UL (ref 0–0.5)
EOSINOPHIL NFR BLD: 0.6 % (ref 0–8)
ERYTHROCYTE [DISTWIDTH] IN BLOOD BY AUTOMATED COUNT: 16.2 % (ref 11.5–14.5)
EST. GFR  (NO RACE VARIABLE): >60 ML/MIN/1.73 M^2
GLUCOSE SERPL-MCNC: 118 MG/DL (ref 70–110)
GLUCOSE SERPL-MCNC: 126 MG/DL (ref 70–110)
HCT VFR BLD AUTO: 43.9 % (ref 37–48.5)
HCT VFR BLD CALC: 45 %PCV (ref 36–54)
HGB BLD-MCNC: 13.3 G/DL (ref 12–16)
IMM GRANULOCYTES # BLD AUTO: 0.03 K/UL (ref 0–0.04)
IMM GRANULOCYTES NFR BLD AUTO: 0.2 % (ref 0–0.5)
LACTATE SERPL-SCNC: 1 MMOL/L (ref 0.5–2.2)
LIPASE SERPL-CCNC: 37 U/L (ref 4–60)
LYMPHOCYTES # BLD AUTO: 7.9 K/UL (ref 1–4.8)
LYMPHOCYTES NFR BLD: 60.9 % (ref 18–48)
MCH RBC QN AUTO: 27 PG (ref 27–31)
MCHC RBC AUTO-ENTMCNC: 30.3 G/DL (ref 32–36)
MCV RBC AUTO: 89 FL (ref 82–98)
MONOCYTES # BLD AUTO: 0.9 K/UL (ref 0.3–1)
MONOCYTES NFR BLD: 7 % (ref 4–15)
NEUTROPHILS # BLD AUTO: 4 K/UL (ref 1.8–7.7)
NEUTROPHILS NFR BLD: 31 % (ref 38–73)
NRBC BLD-RTO: 0 /100 WBC
PLATELET # BLD AUTO: 146 K/UL (ref 150–450)
PMV BLD AUTO: 10.2 FL (ref 9.2–12.9)
POC IONIZED CALCIUM: 1.05 MMOL/L (ref 1.06–1.42)
POC TCO2 (MEASURED): ABNORMAL MMOL/L
POTASSIUM BLD-SCNC: 5.5 MMOL/L (ref 3.5–5.1)
POTASSIUM SERPL-SCNC: 5.8 MMOL/L (ref 3.5–5.1)
PROT SERPL-MCNC: 7.9 G/DL (ref 6–8.4)
RBC # BLD AUTO: 4.93 M/UL (ref 4–5.4)
SAMPLE: ABNORMAL
SODIUM BLD-SCNC: 133 MMOL/L (ref 136–145)
SODIUM SERPL-SCNC: 135 MMOL/L (ref 136–145)
WBC # BLD AUTO: 12.94 K/UL (ref 3.9–12.7)

## 2023-03-13 PROCEDURE — 82565 ASSAY OF CREATININE: CPT

## 2023-03-13 PROCEDURE — 63600175 PHARM REV CODE 636 W HCPCS: Performed by: EMERGENCY MEDICINE

## 2023-03-13 PROCEDURE — 80047 BASIC METABLC PNL IONIZED CA: CPT

## 2023-03-13 PROCEDURE — 99285 EMERGENCY DEPT VISIT HI MDM: CPT | Mod: 25

## 2023-03-13 PROCEDURE — G0378 HOSPITAL OBSERVATION PER HR: HCPCS

## 2023-03-13 PROCEDURE — 96374 THER/PROPH/DIAG INJ IV PUSH: CPT

## 2023-03-13 PROCEDURE — 93010 EKG 12-LEAD: ICD-10-PCS | Mod: ,,, | Performed by: INTERNAL MEDICINE

## 2023-03-13 PROCEDURE — 85025 COMPLETE CBC W/AUTO DIFF WBC: CPT | Performed by: EMERGENCY MEDICINE

## 2023-03-13 PROCEDURE — 82330 ASSAY OF CALCIUM: CPT

## 2023-03-13 PROCEDURE — 25500020 PHARM REV CODE 255: Performed by: EMERGENCY MEDICINE

## 2023-03-13 PROCEDURE — 93005 ELECTROCARDIOGRAM TRACING: CPT

## 2023-03-13 PROCEDURE — 93010 ELECTROCARDIOGRAM REPORT: CPT | Mod: ,,, | Performed by: INTERNAL MEDICINE

## 2023-03-13 PROCEDURE — 96375 TX/PRO/DX INJ NEW DRUG ADDON: CPT | Mod: 59

## 2023-03-13 PROCEDURE — 80053 COMPREHEN METABOLIC PANEL: CPT | Performed by: EMERGENCY MEDICINE

## 2023-03-13 PROCEDURE — 83605 ASSAY OF LACTIC ACID: CPT | Performed by: EMERGENCY MEDICINE

## 2023-03-13 PROCEDURE — 99285 EMERGENCY DEPT VISIT HI MDM: CPT | Mod: ,,, | Performed by: EMERGENCY MEDICINE

## 2023-03-13 PROCEDURE — C9113 INJ PANTOPRAZOLE SODIUM, VIA: HCPCS | Performed by: EMERGENCY MEDICINE

## 2023-03-13 PROCEDURE — 25000003 PHARM REV CODE 250: Performed by: EMERGENCY MEDICINE

## 2023-03-13 PROCEDURE — 83690 ASSAY OF LIPASE: CPT | Performed by: EMERGENCY MEDICINE

## 2023-03-13 PROCEDURE — 99285 PR EMERGENCY DEPT VISIT,LEVEL V: ICD-10-PCS | Mod: ,,, | Performed by: EMERGENCY MEDICINE

## 2023-03-13 PROCEDURE — 82962 GLUCOSE BLOOD TEST: CPT

## 2023-03-13 RX ORDER — MAG HYDROX/ALUMINUM HYD/SIMETH 200-200-20
30 SUSPENSION, ORAL (FINAL DOSE FORM) ORAL ONCE
Status: COMPLETED | OUTPATIENT
Start: 2023-03-13 | End: 2023-03-13

## 2023-03-13 RX ORDER — LEVOTHYROXINE SODIUM 50 UG/1
50 TABLET ORAL
Status: DISCONTINUED | OUTPATIENT
Start: 2023-03-14 | End: 2023-03-13 | Stop reason: HOSPADM

## 2023-03-13 RX ORDER — OXYCODONE HYDROCHLORIDE 5 MG/1
5 TABLET ORAL EVERY 6 HOURS PRN
Status: DISCONTINUED | OUTPATIENT
Start: 2023-03-13 | End: 2023-03-13 | Stop reason: HOSPADM

## 2023-03-13 RX ORDER — SUCRALFATE 1 G/10ML
1 SUSPENSION ORAL
Status: DISCONTINUED | OUTPATIENT
Start: 2023-03-13 | End: 2023-03-13

## 2023-03-13 RX ORDER — FOLIC ACID 1 MG/1
1 TABLET ORAL DAILY
Status: DISCONTINUED | OUTPATIENT
Start: 2023-03-14 | End: 2023-03-13 | Stop reason: HOSPADM

## 2023-03-13 RX ORDER — PROPRANOLOL HYDROCHLORIDE 10 MG/1
10 TABLET ORAL 2 TIMES DAILY
Status: DISCONTINUED | OUTPATIENT
Start: 2023-03-13 | End: 2023-03-13 | Stop reason: HOSPADM

## 2023-03-13 RX ORDER — ALBUTEROL SULFATE 90 UG/1
2 AEROSOL, METERED RESPIRATORY (INHALATION) EVERY 6 HOURS PRN
Status: DISCONTINUED | OUTPATIENT
Start: 2023-03-13 | End: 2023-03-13 | Stop reason: HOSPADM

## 2023-03-13 RX ORDER — IBUPROFEN 200 MG
24 TABLET ORAL
Status: DISCONTINUED | OUTPATIENT
Start: 2023-03-13 | End: 2023-03-13

## 2023-03-13 RX ORDER — FAMOTIDINE 10 MG/ML
20 INJECTION INTRAVENOUS
Status: COMPLETED | OUTPATIENT
Start: 2023-03-13 | End: 2023-03-13

## 2023-03-13 RX ORDER — ONDANSETRON 2 MG/ML
4 INJECTION INTRAMUSCULAR; INTRAVENOUS EVERY 8 HOURS PRN
Status: DISCONTINUED | OUTPATIENT
Start: 2023-03-13 | End: 2023-03-13 | Stop reason: HOSPADM

## 2023-03-13 RX ORDER — ENOXAPARIN SODIUM 100 MG/ML
40 INJECTION SUBCUTANEOUS EVERY 24 HOURS
Status: DISCONTINUED | OUTPATIENT
Start: 2023-03-13 | End: 2023-03-13 | Stop reason: HOSPADM

## 2023-03-13 RX ORDER — SODIUM CHLORIDE 0.9 % (FLUSH) 0.9 %
10 SYRINGE (ML) INJECTION
Status: DISCONTINUED | OUTPATIENT
Start: 2023-03-13 | End: 2023-03-13 | Stop reason: HOSPADM

## 2023-03-13 RX ORDER — PANTOPRAZOLE SODIUM 40 MG/10ML
40 INJECTION, POWDER, LYOPHILIZED, FOR SOLUTION INTRAVENOUS
Status: COMPLETED | OUTPATIENT
Start: 2023-03-13 | End: 2023-03-13

## 2023-03-13 RX ORDER — SODIUM CHLORIDE 0.9 % (FLUSH) 0.9 %
10 SYRINGE (ML) INJECTION EVERY 12 HOURS PRN
Status: DISCONTINUED | OUTPATIENT
Start: 2023-03-13 | End: 2023-03-13 | Stop reason: HOSPADM

## 2023-03-13 RX ORDER — GABAPENTIN 300 MG/1
600 CAPSULE ORAL 2 TIMES DAILY
Status: DISCONTINUED | OUTPATIENT
Start: 2023-03-13 | End: 2023-03-13 | Stop reason: HOSPADM

## 2023-03-13 RX ORDER — TALC
6 POWDER (GRAM) TOPICAL NIGHTLY PRN
Status: DISCONTINUED | OUTPATIENT
Start: 2023-03-13 | End: 2023-03-13 | Stop reason: HOSPADM

## 2023-03-13 RX ORDER — MAG HYDROX/ALUMINUM HYD/SIMETH 200-200-20
30 SUSPENSION, ORAL (FINAL DOSE FORM) ORAL 4 TIMES DAILY PRN
Status: DISCONTINUED | OUTPATIENT
Start: 2023-03-13 | End: 2023-03-13 | Stop reason: HOSPADM

## 2023-03-13 RX ORDER — MORPHINE SULFATE 4 MG/ML
4 INJECTION, SOLUTION INTRAMUSCULAR; INTRAVENOUS
Status: COMPLETED | OUTPATIENT
Start: 2023-03-13 | End: 2023-03-13

## 2023-03-13 RX ORDER — IBUPROFEN 200 MG
16 TABLET ORAL
Status: DISCONTINUED | OUTPATIENT
Start: 2023-03-13 | End: 2023-03-13

## 2023-03-13 RX ORDER — GLUCAGON 1 MG
1 KIT INJECTION
Status: DISCONTINUED | OUTPATIENT
Start: 2023-03-13 | End: 2023-03-13 | Stop reason: HOSPADM

## 2023-03-13 RX ORDER — ACETAMINOPHEN 325 MG/1
650 TABLET ORAL EVERY 4 HOURS PRN
Status: DISCONTINUED | OUTPATIENT
Start: 2023-03-13 | End: 2023-03-13 | Stop reason: HOSPADM

## 2023-03-13 RX ORDER — LIDOCAINE HYDROCHLORIDE 20 MG/ML
15 SOLUTION OROPHARYNGEAL ONCE
Status: COMPLETED | OUTPATIENT
Start: 2023-03-13 | End: 2023-03-13

## 2023-03-13 RX ORDER — AMLODIPINE BESYLATE 5 MG/1
5 TABLET ORAL DAILY
Status: DISCONTINUED | OUTPATIENT
Start: 2023-03-14 | End: 2023-03-13 | Stop reason: HOSPADM

## 2023-03-13 RX ORDER — MORPHINE SULFATE 4 MG/ML
4 INJECTION, SOLUTION INTRAMUSCULAR; INTRAVENOUS EVERY 6 HOURS PRN
Status: DISCONTINUED | OUTPATIENT
Start: 2023-03-13 | End: 2023-03-13 | Stop reason: HOSPADM

## 2023-03-13 RX ORDER — ONDANSETRON 2 MG/ML
4 INJECTION INTRAMUSCULAR; INTRAVENOUS
Status: COMPLETED | OUTPATIENT
Start: 2023-03-13 | End: 2023-03-13

## 2023-03-13 RX ORDER — THIAMINE HCL 100 MG
100 TABLET ORAL DAILY
Status: DISCONTINUED | OUTPATIENT
Start: 2023-03-14 | End: 2023-03-13 | Stop reason: HOSPADM

## 2023-03-13 RX ORDER — TRAZODONE HYDROCHLORIDE 100 MG/1
300 TABLET ORAL NIGHTLY
Status: DISCONTINUED | OUTPATIENT
Start: 2023-03-13 | End: 2023-03-13 | Stop reason: HOSPADM

## 2023-03-13 RX ORDER — NALOXONE HCL 0.4 MG/ML
0.02 VIAL (ML) INJECTION
Status: DISCONTINUED | OUTPATIENT
Start: 2023-03-13 | End: 2023-03-13 | Stop reason: HOSPADM

## 2023-03-13 RX ORDER — PANTOPRAZOLE SODIUM 40 MG/1
40 TABLET, DELAYED RELEASE ORAL EVERY MORNING
Status: DISCONTINUED | OUTPATIENT
Start: 2023-03-14 | End: 2023-03-13 | Stop reason: HOSPADM

## 2023-03-13 RX ADMIN — IOHEXOL 100 ML: 350 INJECTION, SOLUTION INTRAVENOUS at 02:03

## 2023-03-13 RX ADMIN — LIDOCAINE HYDROCHLORIDE 15 ML: 20 SOLUTION ORAL; TOPICAL at 01:03

## 2023-03-13 RX ADMIN — FAMOTIDINE 20 MG: 10 INJECTION, SOLUTION INTRAVENOUS at 01:03

## 2023-03-13 RX ADMIN — MORPHINE SULFATE 4 MG: 4 INJECTION INTRAVENOUS at 01:03

## 2023-03-13 RX ADMIN — ONDANSETRON 4 MG: 2 INJECTION INTRAMUSCULAR; INTRAVENOUS at 01:03

## 2023-03-13 RX ADMIN — PANTOPRAZOLE SODIUM 40 MG: 40 INJECTION, POWDER, FOR SOLUTION INTRAVENOUS at 02:03

## 2023-03-13 RX ADMIN — SODIUM CHLORIDE, POTASSIUM CHLORIDE, SODIUM LACTATE AND CALCIUM CHLORIDE 1000 ML: 600; 310; 30; 20 INJECTION, SOLUTION INTRAVENOUS at 01:03

## 2023-03-13 RX ADMIN — ALUMINUM HYDROXIDE, MAGNESIUM HYDROXIDE, AND SIMETHICONE 30 ML: 200; 200; 20 SUSPENSION ORAL at 01:03

## 2023-03-13 NOTE — ED NOTES
LOC: The patient is awake, alert and aware of environment with an appropriate affect, the patient is oriented x 3 and speaking appropriately.  APPEARANCE: patient is clean and well groomed, patient's clothing is properly fastened.  SKIN: The skin is warm and dry, color consistent with ethnicity  MUSCULOSKELETAL: Patient moving all extremities spontaneously, no obvious swelling or deformities noted.  RESPIRATORY: Airway is open and patent, respirations are spontaneous, patient has a normal effort and rate  ABDOMEN: Soft and non tender to palpation, no distention noted    Patient arrives from home with the complaint of a burning feeling in her chest. Patient states she has been vomiting for the last week and thinks her esophagus is burnt and states she has not been able to eat or drink. Patient is in no acute distress noted, will continue to monitor

## 2023-03-13 NOTE — Clinical Note
Diagnosis: Hyperkalemia [[id number]]   Future Attending Provider: TATY FISCHER [[id number]]   Admitting Provider:: TATY FISCHER [[id number]]

## 2023-03-13 NOTE — PLAN OF CARE
Patient planned admission to  for observation for severe esophagitis. Labs and imaging reviewed. Planned for clear liquid diet and npo midnight with potential GI evaluation in the am given reported severity of symptoms. Shortly after being seen patient brought food by  and tolerating diet. She signed AMA forms and was discharged.

## 2023-03-13 NOTE — ED NOTES
I-STAT Chem-8+ Results:   Value Reference Range   Sodium 133 136-145 mmol/L   Potassium  5.5 3.5-5.1 mmol/L   Chloride 99  mmol/L   Ionized Calcium 1.05 1.06-1.42 mmol/L   CO2 (measured) ** 23-29 mmol/L   Glucose 126  mg/dL   BUN 19 6-30 mg/dL   Creatinine 0.8 0.5-1.4 mg/dL   Hematocrit 45 36-54%

## 2023-03-13 NOTE — ED PROVIDER NOTES
Encounter Date: 3/13/2023    SCRIBE #1 NOTE: I, Gianna Johnson, am scribing for, and in the presence of,  Jose Jackson MD. I have scribed the following portions of the note - Other sections scribed: HPI, ROS, PE.     History     Chief Complaint   Patient presents with    Dysphagia     Burned my esophagus with vomiting , seen here on Friday and not able to eat or drink     Time patient was seen by the provider: 12:31 PM      The patient is a 62 y.o. female with past medical history of HTN, HLD, T2DM, and alcoholism who presents to the ED with a complaint of dysphagia ongoing for several days. Patient was seen here in the ED 3 days ago for N/V/D and decreased eating after quitting alcohol a week ago. She was prescribed sucralfate however she has since completed the prescription without improvement to her pain. Upon evaluation today, the pt reports she continues to have difficulty swallowing with persistent nausea and vomiting. Described as a constant burning sensation that begins at the esophagus and radiates to the chest and abdomen. Pain worsened with breathing. Associated symptoms include decreased appetite, fatigue, weakness, and dizziness. Denies back pain, fever and chills. Pt reports she is not currently having symptoms of withdrawal. She reports her last EtOH intake was over a week ago. She is not currently on blood thinners or medications for withdrawal.    Review of patient's allergies indicates:   Allergen Reactions    Lortab [hydrocodone-acetaminophen] Itching    Promethazine Itching and Other (See Comments)     Past Medical History:   Diagnosis Date    Anemia     Anemia     Controlled type 2 diabetes mellitus without complication, without long-term current use of insulin 11/30/2021    COPD (chronic obstructive pulmonary disease)     Depression     Diverticulitis     Fatty liver     GERD (gastroesophageal reflux disease)     Hyperlipidemia     Hypertension     Pancreatitis     Peptic ulcer disease      Polysubstance abuse     Posterior reversible encephalopathy syndrome     Sarcoidosis of lung     Sarcoidosis of lung     over 30 yrs ago    Seizures     7/2017    Suicide attempt     Suicide ideation      Past Surgical History:   Procedure Laterality Date    APPENDECTOMY      BILATERAL MASTECTOMY Bilateral 10/29/2020    Procedure: MASTECTOMY, BILATERAL;  Surgeon: Baylee Kevin MD;  Location: [address];  Service: General;  Laterality: Bilateral;    BREAST REVISION SURGERY Bilateral 2/11/2021    Procedure: BREAST REVISION SURGERY;  Surgeon: Scottie Johnson MD;  Location: [address];  Service: Plastics;  Laterality: Bilateral;    COLONOSCOPY N/A 7/28/2017    Procedure: COLONOSCOPY;  Surgeon: Aaron Alvarado MD;  [facility]: [facility];  Service: Endoscopy;  Laterality: N/A;    ESOPHAGOGASTRODUODENOSCOPY  10/7/2016, 11/6/2014    2016 - gastritis, duodenitis, 2014 erosive gastritis    ESOPHAGOGASTRODUODENOSCOPY N/A 2/11/2020    Procedure: ESOPHAGOGASTRODUODENOSCOPY (EGD);  Surgeon: Fawn Garrido MD;  [facility]: [facility];  Service: Endoscopy;  Laterality: N/A;    ESOPHAGOGASTRODUODENOSCOPY N/A 4/19/2021    Procedure: EGD (ESOPHAGOGASTRODUODENOSCOPY);  Surgeon: Paramjit Martino MD;  [facility]: [facility];  Service: Endoscopy;  Laterality: N/A;    FLEXIBLE SIGMOIDOSCOPY  11/06/2014    colitis    HYSTERECTOMY      IMPLANTATION OF PERMANENT SACRAL NERVE STIMULATOR N/A 7/12/2022    Procedure: INSERTION, NEUROSTIMULATOR, PERMANENT, SACRAL;  Surgeon: Juaquin Edwards MD;  Location: [address];  Service: Urology;  Laterality: N/A;  1hr    INJECTION FOR SENTINEL NODE IDENTIFICATION Right 10/29/2020    Procedure: INJECTION, FOR SENTINEL NODE IDENTIFICATION;  Surgeon: Baylee Kevin MD;  Location: [address];  Service: General;  Laterality: Right;    INJECTION OF JOINT Right 10/10/2019    Procedure: Injection, Joint RIGHT ILIOPSOAS BURSA/TENDON INJECTION AND RIGHT GLUTEAL TENDON INJECTION WITH  STEROID AND LIDOCAINE;  Surgeon: Guillaume Rico MD;  Location: [facility];  Service: Pain Management;  Laterality: Right;  NEEDS CONSENT    INSERTION OF BREAST TISSUE EXPANDER Bilateral 10/29/2020    Procedure: INSERTION, TISSUE EXPANDER, BREAST;  Surgeon: Scottie Johnson MD;  Location: [address];  Service: Plastics;  Laterality: Bilateral;  Right breast: 1082 g  Left breast: 1076 g    LIPOSUCTION Bilateral 2021    Procedure: LIPOSUCTION;  Surgeon: Scottie Johnson MD;  Location: [address];  Service: Plastics;  Laterality: Bilateral;    mediastenoscopy      REPLACEMENT OF IMPLANT OF BREAST Bilateral 2021    Procedure: REPLACEMENT, IMPLANT, BREAST;  Surgeon: Scottie Johnson MD;  Location: [address];  Service: Plastics;  Laterality: Bilateral;    SENTINEL LYMPH NODE BIOPSY Right 10/29/2020    Procedure: BIOPSY, LYMPH NODE, SENTINEL;  Surgeon: Baylee Kevin MD;  Location: [address];  Service: General;  Laterality: Right;    TONSILLECTOMY N/A     TUBAL LIGATION       Family History   Problem Relation Age of Onset    Heart attack Father     Diabetes Father     Hypertension Father     Diabetes Mother     Hypertension Mother     Breast cancer Maternal Aunt     Colon cancer Maternal Uncle     Breast cancer Daughter     Ovarian cancer Neg Hx     Cancer Neg Hx      Social History     Tobacco Use    Smoking status: Former     Packs/day: 0.50     Years: 30.00     Pack years: 15.00     Types: Vaping with nicotine, Cigarettes     Quit date: 2021     Years since quittin.1    Smokeless tobacco: Never   Substance Use Topics    Alcohol use: Yes     Comment: vodka daily (half a regular bottle)    Drug use: Yes     Types: Marijuana     Comment: gummies     Review of Systems  All other systems reviewed and negative except as noted in HPI   Physical Exam     Initial Vitals [23 1151]   BP Pulse Resp Temp SpO2   (!) 140/64 (!) 114 18 99 °F (37.2 °C) 95 %      MAP        --         Physical Exam  General: AO x 3, NAD. Well nourished. Well Developed  Head: Normocephalic and Atraumatic  HEENT: PERRLA. EOMI. OP Clear. Dry mucous membranes.  Neck: Supple, Nontender in midline.  Cardiovascular: Tachycardic. RRR. No m/r/g. 2+ Distal Pulses  Pulm/Chest: Chest nontender. Lungs clear to auscultation. No respiratory distress.  Abdomen: Nontender. Nondistended. No rigidity, rebound, or guarding  MSK: extremities atraumatic x 4. Gait normal  Ext: no clubbing, cyanosis, or edema  Neuro: GCS 15. CN II-XII intact. Intact symmetric sensation, strength, DTR x 4 extremities  Skin: no bullae, petechiae, or purpura. Warm, dry, and intact.  Psych: normal mood and affect.     ED Course   Procedures  Labs Reviewed   CBC W/ AUTO DIFFERENTIAL - Abnormal; Notable for the following components:       Result Value    WBC 12.94 (*)     MCHC 30.3 (*)     RDW 16.2 (*)     Platelets 146 (*)     Lymph # 7.9 (*)     Gran % 31.0 (*)     Lymph % 60.9 (*)     All other components within normal limits   COMPREHENSIVE METABOLIC PANEL - Abnormal; Notable for the following components:    Sodium 135 (*)     Potassium 5.8 (*)     CO2 22 (*)     Glucose 118 (*)     AST 44 (*)     All other components within normal limits   ISTAT PROCEDURE - Abnormal; Notable for the following components:    POC Glucose 126 (*)     POC Sodium 133 (*)     POC Potassium 5.5 (*)     POC Ionized Calcium 1.05 (*)     All other components within normal limits   LIPASE   LACTIC ACID, PLASMA     EKG Readings: (Independently Interpreted)   Initial Reading: No STEMI. Previous EKG: Compared with most recent EKG Previous EKG Date: 03/10/2023. Rhythm: Normal Sinus Rhythm. Heart Rate: 108.   PACs resolved   ECG Results              EKG 12-lead (Final result)  Result time 03/13/23 16:41:15      Final result by Interface, Lab In [facility] (03/13/23 16:41:15)                   Narrative:    Test Reason : R13.10,    Vent. Rate : 108 BPM     Atrial Rate : 108  BPM     P-R Int : 136 ms          QRS Dur : 080 ms      QT Int : 318 ms       P-R-T Axes : 080 077 017 degrees     QTc Int : 426 ms    Sinus tachycardia  Otherwise normal ECG  When compared with ECG of 10-MAR-2023 09:25,  Premature atrial complexes are no longer Present  Confirmed by Carloz Anna MD ([id number]) on 3/13/2023 4:41:07 PM    Referred By:             Confirmed By:Carloz Anna MD                                  Imaging Results              CT Chest Abdomen Pelvis With Contrast (xpd) (Final result)  Result time 03/13/23 15:41:34      Final result by Jose Chavira MD (03/13/23 15:41:34)                   Impression:      1. No acute intrathoracic or abdominopelvic abnormality.  No pneumomediastinum to suggest esophageal perforation as clinically questioned.  2. Hepatosplenomegaly.  Possible cirrhotic liver morphology, consider correlation with LFTs.  3. Multifocal adenopathy as described above, similar to prior.  4. Stable 2.1 cm splenic hypodensity.  5.  Additional findings as above.    Electronically signed by resident: Reji Ralph  Date:    03/13/2023  Time:    14:50    Electronically signed by: Jose Chavira MD  Date:    03/13/2023  Time:    15:41               Narrative:    EXAMINATION:  CT CHEST ABDOMEN PELVIS WITH CONTRAST (XPD)    CLINICAL HISTORY:  Hematemesis;Concern for possible esophageal perforation secondary repeated retching;    TECHNIQUE:  Low dose axial images were obtained from the thoracic inlet to the pubic symphysis following the intravenous administration of 100 cc of Omnipaque 350.  Sagittal and coronal reformats were provided.    COMPARISON:  Liver Doppler ultrasound 12/23/2022.  CT abdomen pelvis 05/01/2022.  CT chest 03/25/2022.    FINDINGS:  Thoracic soft tissues: Thyroid is unremarkable.  Postoperative change of bilateral mastectomy with bilateral breast implants.  Prominent bilateral axillary nodes, nonenlarged by size criteria and demonstrate normal reniform morphology.    Heart: No  cardiomegaly or pericardial effusion.  Minimal coronary calcific atherosclerosis.    Rasheeda/Mediastinum: Numerous prominent mediastinal lymph nodes, nonenlarged by size criteria, including a prominent right paratracheal node measuring 0.8 cm short axis (axial series 2, image 36).  Overall appearance is similar to prior.    Lungs: Lingular and left basilar bandlike subsegmental atelectasis versus scarring.  No large focal consolidation, pneumothorax, or pleural effusion.    Liver: Enlarged.  Subtle nodular contour suggesting cirrhosis.   No focal hepatic abnormality.  Portal vein is patent.    Gallbladder: Normal in appearance without evidence for cholecystitis.    Bile Ducts: No intra or extrahepatic biliary ductal dilation.    Pancreas: No pancreatic mass lesion or peripancreatic inflammatory change.    Spleen: Enlarged.  Stable 2.1 cm hypoattenuating focus of the inferior spleen.  Small splenule noted.    Adrenals: Unremarkable.    Kidneys/ Ureters: Normal in size and location.  Kidneys enhance symmetrically.  No focal renal abnormality or nephrolithiasis.  No hydroureteronephrosis.    Bladder: Smooth contours without bladder wall thickening.    Reproductive organs: Uterus is surgically absent.    GI Tract/Mesentery: Small hiatal hernia.  The esophagus and stomach are normal in appearance.  Visualized loops of small and large bowel are normal in caliber without evidence for obstruction or inflammation. Appendix is surgically absent.    Peritoneal Space: No intraperitoneal free air or free fluid.    Lymph nodes: Enlarged nolvia hepatis node measures 1.3 cm short axis (series 3, image 59), similar to prior.  Prominent gastrohepatic, mesenteric, and retroperitoneal lymph nodes, nonenlarged by size criteria.    Abdominal wall/extraperitoneal soft tissues: Spinal stimulator generator overlies the right gluteal musculature.  Prominent bilateral inguinal nodes, nonenlarged by size criteria.    Vasculature: Left-sided aortic  arch.  Thoracic and abdominal aorta are normal in caliber, contour, and course.  Mild aortoiliac calcific atherosclerosis.    Bones: Postoperative change of posterior instrumented fusion of L4-L5.  Degenerative change without acute fracture or bone destructive process.                                    X-Rays:   Independently Interpreted Readings:   Other Readings:  CT chest abdomen pelvis with contrast:  No evidence of esophageal perforation  Medications   morphine injection 4 mg (4 mg Intravenous Given 3/13/23 1358)   ondansetron injection 4 mg (4 mg Intravenous Given 3/13/23 1358)   aluminum-magnesium hydroxide-simethicone 200-200-20 mg/5 mL suspension 30 mL (30 mLs Oral Given 3/13/23 1358)     And   LIDOcaine HCl 2% oral solution 15 mL (15 mLs Oral Given 3/13/23 1358)   famotidine (PF) injection 20 mg (20 mg Intravenous Given 3/13/23 1358)   pantoprazole injection 40 mg (40 mg Intravenous Given 3/13/23 1424)   lactated ringers bolus 1,000 mL (0 mLs Intravenous Stopped 3/13/23 1427)   iohexoL (OMNIPAQUE 350) injection 100 mL (100 mLs Intravenous Given 3/13/23 1443)     Medical Decision Making:   History:   Old Medical Records: I decided to obtain old medical records.  Initial Assessment:   See ED course for additional HPI, ROS, PE, lab, imaging, consultant discussion, procedure interpretation, and Medical Decision Making.   Independently Interpreted Test(s):   I have ordered and independently interpreted X-rays - see prior notes.  I have ordered and independently interpreted EKG Reading(s) - see prior notes  Clinical Tests:   Lab Tests: Ordered and Reviewed  Radiological Study: Ordered and Reviewed  Medical Tests: Ordered and Reviewed  ED Management:  See ED course for additional HPI, ROS, PE, lab, imaging, consultant discussion, procedure interpretation, and Medical Decision Making.  CT reassuring.  However, patient had ongoing severe symptoms that precluded discharge.  Arrange discharge.  Treat the patient  with H2 blocker, PPI, topical treatment.  Plan for admission for serial H&H trend and evaluation by GI.  After I turned the patient over and arrange admission, she decided to leave against medical advice.        Scribe Attestation:   Scribe #1: I performed the above scribed service and the documentation accurately describes the services I performed. I attest to the accuracy of the note.      ED Course as of 03/14/23 0745   Mon Mar 13, 2023   1249 62-year-old female with recent visit for nausea vomiting in the setting of alcohol withdrawal.  Patient reports that since that visit on March 10th she has had intractable burning in her epigastrium and chest.  She relates to repetitive retching.  She reports that she has pain all the time but it is worse when she drinks liquids, solids or even swallows her own secretions.  She reports she has not had a drink for an extended period.  She denies use of medications for withdrawal. [DS]   1250 Presentation at this time is not consistent with alcohol withdrawal.  Patient has no crepitus or hemodynamic instability cyst to suggest severe mediastinitis.  However, my concern at this time is for mediastinitis versus esophageal perforation versus esophagitis, gastritis, or pancreatitis.  Will obtain CT chest abdomen pelvis.  Will obtain lab assays given p.o. intolerance.  Provide IV hydration and pain relief. [DS]   1356 CBC is not consistent with acute blood loss.  The patient's white blood cell count and thrombocytopenia have improved from previous. [DS]   1450 Potassium noted to be elevated.  However, EKG not consistent with same.  Renal function normal.  I-STAT chemistry order to confirm hyperkalemia. [DS]   1454 Lipase is unremarkable.  Doubt pancreatitis. [DS]   1454 Mild transaminitis consistent with previous; visible hemolysis to sample.  Lactic acid unremarkable. [DS]   1625 CT of chest/abdomen/pelvis without acute abnormality.  Cirrhotic changes to liver consistent with  patient's past history. [DS]   1721 Repeatedly hemolyzed samples.  However, no EKG changes.  I am concerned the patient may actually be hypokalemic given her decreased p.o. intake. [DS]      ED Course User Index  [DS] Jose Jackson MD                 Clinical Impression:   Final diagnoses:  [R13.10] Dysphagia  [K20.90] Esophagitis  [E87.5] Hyperkalemia (Primary)        ED Disposition Condition    Observation Stable                Jose Jackson MD  03/14/23 [id number]

## 2023-03-15 DIAGNOSIS — E11.9 TYPE 2 DIABETES MELLITUS WITHOUT COMPLICATION: ICD-10-CM

## 2023-03-24 ENCOUNTER — TELEPHONE (OUTPATIENT)
Dept: PULMONOLOGY | Facility: CLINIC | Age: 63
End: 2023-03-24
Payer: COMMERCIAL

## 2023-03-24 NOTE — TELEPHONE ENCOUNTER
I tried to contact patient in regards to scheduling a follow up appointment and a CT scan per Dr. Jones. I left a voicemail for patient to call office and discuss.

## 2023-03-26 ENCOUNTER — HOSPITAL ENCOUNTER (INPATIENT)
Facility: HOSPITAL | Age: 63
LOS: 4 days | Discharge: HOME | DRG: 897 | End: 2023-03-30
Attending: EMERGENCY MEDICINE | Admitting: EMERGENCY MEDICINE
Payer: COMMERCIAL

## 2023-03-26 DIAGNOSIS — F10.20 ALCOHOLISM: Primary | ICD-10-CM

## 2023-03-26 DIAGNOSIS — J44.9 CHRONIC OBSTRUCTIVE PULMONARY DISEASE, UNSPECIFIED COPD TYPE: ICD-10-CM

## 2023-03-26 LAB
ALBUMIN SERPL BCP-MCNC: 3.6 G/DL (ref 3.5–5.2)
ALLENS TEST: ABNORMAL
ALP SERPL-CCNC: 44 U/L (ref 55–135)
ALT SERPL W/O P-5'-P-CCNC: 20 U/L (ref 10–44)
ANION GAP SERPL CALC-SCNC: 17 MMOL/L (ref 8–16)
ANISOCYTOSIS BLD QL SMEAR: SLIGHT
AST SERPL-CCNC: 44 U/L (ref 10–40)
BASOPHILS NFR BLD: 0 % (ref 0–1.9)
BILIRUB SERPL-MCNC: 0.6 MG/DL (ref 0.1–1)
BNP SERPL-MCNC: <10 PG/ML (ref 0–99)
BUN SERPL-MCNC: 9 MG/DL (ref 8–23)
CALCIUM SERPL-MCNC: 8.3 MG/DL (ref 8.7–10.5)
CHLORIDE SERPL-SCNC: 102 MMOL/L (ref 95–110)
CO2 SERPL-SCNC: 22 MMOL/L (ref 23–29)
CREAT SERPL-MCNC: 0.7 MG/DL (ref 0.5–1.4)
DIFFERENTIAL METHOD: ABNORMAL
EOSINOPHIL NFR BLD: 0 % (ref 0–8)
ERYTHROCYTE [DISTWIDTH] IN BLOOD BY AUTOMATED COUNT: 18.6 % (ref 11.5–14.5)
EST. GFR  (NO RACE VARIABLE): >60 ML/MIN/1.73 M^2
ESTIMATED AVG GLUCOSE: 100 MG/DL (ref 68–131)
ETHANOL SERPL-MCNC: 253 MG/DL
GLUCOSE SERPL-MCNC: 95 MG/DL (ref 70–110)
HBA1C MFR BLD: 5.1 % (ref 4–5.6)
HCO3 UR-SCNC: 26.9 MMOL/L (ref 24–28)
HCT VFR BLD AUTO: 41.9 % (ref 37–48.5)
HGB BLD-MCNC: 12.2 G/DL (ref 12–16)
HYPOCHROMIA BLD QL SMEAR: ABNORMAL
IMM GRANULOCYTES # BLD AUTO: ABNORMAL K/UL (ref 0–0.04)
IMM GRANULOCYTES NFR BLD AUTO: ABNORMAL % (ref 0–0.5)
INR PPP: 1.1 (ref 0.8–1.2)
LACTATE SERPL-SCNC: 2 MMOL/L (ref 0.5–2.2)
LACTATE SERPL-SCNC: 2.4 MMOL/L (ref 0.5–2.2)
LIPASE SERPL-CCNC: 14 U/L (ref 4–60)
LYMPHOCYTES NFR BLD: 84 % (ref 18–48)
MAGNESIUM SERPL-MCNC: 1.5 MG/DL (ref 1.6–2.6)
MCH RBC QN AUTO: 28.2 PG (ref 27–31)
MCHC RBC AUTO-ENTMCNC: 29.1 G/DL (ref 32–36)
MCV RBC AUTO: 97 FL (ref 82–98)
MONOCYTES NFR BLD: 0 % (ref 4–15)
NEUTROPHILS NFR BLD: 16 % (ref 38–73)
NRBC BLD-RTO: 0 /100 WBC
OVALOCYTES BLD QL SMEAR: ABNORMAL
PCO2 BLDA: 53 MMHG (ref 35–45)
PH SMN: 7.31 [PH] (ref 7.35–7.45)
PLATELET # BLD AUTO: 109 K/UL (ref 150–450)
PMV BLD AUTO: 10.1 FL (ref 9.2–12.9)
PO2 BLDA: 36 MMHG (ref 40–60)
POC BE: 1 MMOL/L
POC SATURATED O2: 63 % (ref 95–100)
POC TCO2: 28 MMOL/L (ref 24–29)
POCT GLUCOSE: 56 MG/DL (ref 70–110)
POCT GLUCOSE: 58 MG/DL (ref 70–110)
POCT GLUCOSE: 87 MG/DL (ref 70–110)
POIKILOCYTOSIS BLD QL SMEAR: SLIGHT
POLYCHROMASIA BLD QL SMEAR: ABNORMAL
POTASSIUM SERPL-SCNC: 4.3 MMOL/L (ref 3.5–5.1)
PROT SERPL-MCNC: 6.1 G/DL (ref 6–8.4)
PROTHROMBIN TIME: 11.8 SEC (ref 9–12.5)
RBC # BLD AUTO: 4.33 M/UL (ref 4–5.4)
SAMPLE: ABNORMAL
SITE: ABNORMAL
SODIUM SERPL-SCNC: 141 MMOL/L (ref 136–145)
SPHEROCYTES BLD QL SMEAR: ABNORMAL
WBC # BLD AUTO: 13.01 K/UL (ref 3.9–12.7)

## 2023-03-26 PROCEDURE — 85027 COMPLETE CBC AUTOMATED: CPT | Performed by: EMERGENCY MEDICINE

## 2023-03-26 PROCEDURE — 83690 ASSAY OF LIPASE: CPT | Performed by: EMERGENCY MEDICINE

## 2023-03-26 PROCEDURE — 83735 ASSAY OF MAGNESIUM: CPT | Performed by: EMERGENCY MEDICINE

## 2023-03-26 PROCEDURE — 82077 ASSAY SPEC XCP UR&BREATH IA: CPT | Performed by: EMERGENCY MEDICINE

## 2023-03-26 PROCEDURE — 99223 1ST HOSP IP/OBS HIGH 75: CPT | Mod: ,,, | Performed by: STUDENT IN AN ORGANIZED HEALTH CARE EDUCATION/TRAINING PROGRAM

## 2023-03-26 PROCEDURE — 85610 PROTHROMBIN TIME: CPT | Performed by: EMERGENCY MEDICINE

## 2023-03-26 PROCEDURE — 96361 HYDRATE IV INFUSION ADD-ON: CPT

## 2023-03-26 PROCEDURE — 96374 THER/PROPH/DIAG INJ IV PUSH: CPT

## 2023-03-26 PROCEDURE — 63600175 PHARM REV CODE 636 W HCPCS: Performed by: EMERGENCY MEDICINE

## 2023-03-26 PROCEDURE — 83605 ASSAY OF LACTIC ACID: CPT | Mod: 91

## 2023-03-26 PROCEDURE — 83036 HEMOGLOBIN GLYCOSYLATED A1C: CPT

## 2023-03-26 PROCEDURE — 63600175 PHARM REV CODE 636 W HCPCS

## 2023-03-26 PROCEDURE — 96375 TX/PRO/DX INJ NEW DRUG ADDON: CPT

## 2023-03-26 PROCEDURE — 99285 EMERGENCY DEPT VISIT HI MDM: CPT | Mod: ,,, | Performed by: EMERGENCY MEDICINE

## 2023-03-26 PROCEDURE — 80053 COMPREHEN METABOLIC PANEL: CPT | Performed by: EMERGENCY MEDICINE

## 2023-03-26 PROCEDURE — 99223 PR INITIAL HOSPITAL CARE,LEVL III: ICD-10-PCS | Mod: ,,, | Performed by: STUDENT IN AN ORGANIZED HEALTH CARE EDUCATION/TRAINING PROGRAM

## 2023-03-26 PROCEDURE — 99285 EMERGENCY DEPT VISIT HI MDM: CPT | Mod: 25

## 2023-03-26 PROCEDURE — 83880 ASSAY OF NATRIURETIC PEPTIDE: CPT | Performed by: EMERGENCY MEDICINE

## 2023-03-26 PROCEDURE — 27000221 HC OXYGEN, UP TO 24 HOURS

## 2023-03-26 PROCEDURE — 25000003 PHARM REV CODE 250

## 2023-03-26 PROCEDURE — 36415 COLL VENOUS BLD VENIPUNCTURE: CPT

## 2023-03-26 PROCEDURE — 63600175 PHARM REV CODE 636 W HCPCS: Performed by: STUDENT IN AN ORGANIZED HEALTH CARE EDUCATION/TRAINING PROGRAM

## 2023-03-26 PROCEDURE — 94761 N-INVAS EAR/PLS OXIMETRY MLT: CPT

## 2023-03-26 PROCEDURE — 82962 GLUCOSE BLOOD TEST: CPT | Mod: 91

## 2023-03-26 PROCEDURE — 12000002 HC ACUTE/MED SURGE SEMI-PRIVATE ROOM

## 2023-03-26 PROCEDURE — 25000003 PHARM REV CODE 250: Performed by: STUDENT IN AN ORGANIZED HEALTH CARE EDUCATION/TRAINING PROGRAM

## 2023-03-26 PROCEDURE — 99900035 HC TECH TIME PER 15 MIN (STAT)

## 2023-03-26 PROCEDURE — 82803 BLOOD GASES ANY COMBINATION: CPT

## 2023-03-26 PROCEDURE — 85007 BL SMEAR W/DIFF WBC COUNT: CPT | Performed by: EMERGENCY MEDICINE

## 2023-03-26 PROCEDURE — 99285 PR EMERGENCY DEPT VISIT,LEVEL V: ICD-10-PCS | Mod: ,,, | Performed by: EMERGENCY MEDICINE

## 2023-03-26 PROCEDURE — 83605 ASSAY OF LACTIC ACID: CPT | Performed by: EMERGENCY MEDICINE

## 2023-03-26 RX ORDER — DEXTROSE MONOHYDRATE AND SODIUM CHLORIDE 5; .9 G/100ML; G/100ML
INJECTION, SOLUTION INTRAVENOUS CONTINUOUS
Status: DISCONTINUED | OUTPATIENT
Start: 2023-03-26 | End: 2023-03-27

## 2023-03-26 RX ORDER — METOCLOPRAMIDE HYDROCHLORIDE 5 MG/ML
10 INJECTION INTRAMUSCULAR; INTRAVENOUS
Status: COMPLETED | OUTPATIENT
Start: 2023-03-26 | End: 2023-03-26

## 2023-03-26 RX ORDER — FOLIC ACID 1 MG/1
1 TABLET ORAL DAILY
Status: DISCONTINUED | OUTPATIENT
Start: 2023-03-27 | End: 2023-03-26

## 2023-03-26 RX ORDER — LORAZEPAM 2 MG/ML
2 INJECTION INTRAMUSCULAR
Status: COMPLETED | OUTPATIENT
Start: 2023-03-26 | End: 2023-03-26

## 2023-03-26 RX ORDER — CHLORDIAZEPOXIDE HYDROCHLORIDE 25 MG/1
50 CAPSULE, GELATIN COATED ORAL
Status: DISCONTINUED | OUTPATIENT
Start: 2023-03-27 | End: 2023-03-27

## 2023-03-26 RX ORDER — CHLORDIAZEPOXIDE HYDROCHLORIDE 25 MG/1
25 CAPSULE, GELATIN COATED ORAL
Status: DISCONTINUED | OUTPATIENT
Start: 2023-03-29 | End: 2023-03-27

## 2023-03-26 RX ORDER — THIAMINE HCL 100 MG
100 TABLET ORAL DAILY
Status: DISCONTINUED | OUTPATIENT
Start: 2023-03-27 | End: 2023-03-30 | Stop reason: HOSPADM

## 2023-03-26 RX ORDER — LORAZEPAM 0.5 MG/1
2 TABLET ORAL EVERY 4 HOURS PRN
Status: DISCONTINUED | OUTPATIENT
Start: 2023-03-26 | End: 2023-03-26

## 2023-03-26 RX ORDER — CHLORDIAZEPOXIDE HYDROCHLORIDE 25 MG/1
25 CAPSULE, GELATIN COATED ORAL
Status: DISCONTINUED | OUTPATIENT
Start: 2023-03-30 | End: 2023-03-27

## 2023-03-26 RX ORDER — ARIPIPRAZOLE 5 MG/1
5 TABLET ORAL DAILY
Status: DISCONTINUED | OUTPATIENT
Start: 2023-03-27 | End: 2023-03-30 | Stop reason: HOSPADM

## 2023-03-26 RX ORDER — INSULIN ASPART 100 [IU]/ML
0-5 INJECTION, SOLUTION INTRAVENOUS; SUBCUTANEOUS
Status: DISCONTINUED | OUTPATIENT
Start: 2023-03-26 | End: 2023-03-30 | Stop reason: HOSPADM

## 2023-03-26 RX ORDER — GLUCAGON 1 MG
1 KIT INJECTION
Status: DISCONTINUED | OUTPATIENT
Start: 2023-03-26 | End: 2023-03-30 | Stop reason: HOSPADM

## 2023-03-26 RX ORDER — SODIUM CHLORIDE 0.9 % (FLUSH) 0.9 %
10 SYRINGE (ML) INJECTION
Status: DISCONTINUED | OUTPATIENT
Start: 2023-03-26 | End: 2023-03-30 | Stop reason: HOSPADM

## 2023-03-26 RX ORDER — DEXTROSE 40 %
15 GEL (GRAM) ORAL
Status: DISCONTINUED | OUTPATIENT
Start: 2023-03-26 | End: 2023-03-30 | Stop reason: HOSPADM

## 2023-03-26 RX ORDER — CHLORDIAZEPOXIDE HYDROCHLORIDE 25 MG/1
50 CAPSULE, GELATIN COATED ORAL
Status: DISCONTINUED | OUTPATIENT
Start: 2023-03-26 | End: 2023-03-27

## 2023-03-26 RX ORDER — ENOXAPARIN SODIUM 100 MG/ML
40 INJECTION SUBCUTANEOUS EVERY 24 HOURS
Status: DISCONTINUED | OUTPATIENT
Start: 2023-03-26 | End: 2023-03-30 | Stop reason: HOSPADM

## 2023-03-26 RX ORDER — DULOXETIN HYDROCHLORIDE 30 MG/1
30 CAPSULE, DELAYED RELEASE ORAL DAILY
Status: DISCONTINUED | OUTPATIENT
Start: 2023-03-27 | End: 2023-03-30 | Stop reason: HOSPADM

## 2023-03-26 RX ORDER — CHLORDIAZEPOXIDE HYDROCHLORIDE 25 MG/1
25 CAPSULE, GELATIN COATED ORAL
Status: DISCONTINUED | OUTPATIENT
Start: 2023-03-28 | End: 2023-03-27

## 2023-03-26 RX ORDER — ONDANSETRON 4 MG/1
4 TABLET, ORALLY DISINTEGRATING ORAL EVERY 8 HOURS PRN
Status: DISCONTINUED | OUTPATIENT
Start: 2023-03-26 | End: 2023-03-30 | Stop reason: HOSPADM

## 2023-03-26 RX ORDER — AMLODIPINE BESYLATE 5 MG/1
5 TABLET ORAL DAILY
Status: DISCONTINUED | OUTPATIENT
Start: 2023-03-27 | End: 2023-03-27

## 2023-03-26 RX ORDER — LEVOTHYROXINE SODIUM 50 UG/1
50 TABLET ORAL
Status: DISCONTINUED | OUTPATIENT
Start: 2023-03-27 | End: 2023-03-30 | Stop reason: HOSPADM

## 2023-03-26 RX ORDER — FOLIC ACID 1 MG/1
1 TABLET ORAL DAILY
Status: DISCONTINUED | OUTPATIENT
Start: 2023-03-26 | End: 2023-03-30 | Stop reason: HOSPADM

## 2023-03-26 RX ORDER — IBUPROFEN 200 MG
1 TABLET ORAL DAILY
Status: DISCONTINUED | OUTPATIENT
Start: 2023-03-27 | End: 2023-03-30 | Stop reason: HOSPADM

## 2023-03-26 RX ORDER — CHLORDIAZEPOXIDE HYDROCHLORIDE 25 MG/1
100 CAPSULE, GELATIN COATED ORAL ONCE
Status: COMPLETED | OUTPATIENT
Start: 2023-03-26 | End: 2023-03-26

## 2023-03-26 RX ORDER — CHLORDIAZEPOXIDE HYDROCHLORIDE 25 MG/1
25 CAPSULE, GELATIN COATED ORAL
Status: DISCONTINUED | OUTPATIENT
Start: 2023-03-31 | End: 2023-03-27

## 2023-03-26 RX ORDER — DEXTROSE 40 %
30 GEL (GRAM) ORAL
Status: DISCONTINUED | OUTPATIENT
Start: 2023-03-26 | End: 2023-03-30 | Stop reason: HOSPADM

## 2023-03-26 RX ORDER — LORAZEPAM 0.5 MG/1
2 TABLET ORAL
Status: DISCONTINUED | OUTPATIENT
Start: 2023-03-26 | End: 2023-03-30

## 2023-03-26 RX ORDER — ONDANSETRON 2 MG/ML
8 INJECTION INTRAMUSCULAR; INTRAVENOUS
Status: COMPLETED | OUTPATIENT
Start: 2023-03-26 | End: 2023-03-26

## 2023-03-26 RX ORDER — TALC
6 POWDER (GRAM) TOPICAL NIGHTLY PRN
Status: DISCONTINUED | OUTPATIENT
Start: 2023-03-26 | End: 2023-03-30 | Stop reason: HOSPADM

## 2023-03-26 RX ADMIN — LORAZEPAM 2 MG: 0.5 TABLET ORAL at 05:03

## 2023-03-26 RX ADMIN — DEXTROSE MONOHYDRATE AND SODIUM CHLORIDE: 5; .9 INJECTION, SOLUTION INTRAVENOUS at 02:03

## 2023-03-26 RX ADMIN — FOLIC ACID 1 MG: 1 TABLET ORAL at 03:03

## 2023-03-26 RX ADMIN — CHLORDIAZEPOXIDE HYDROCHLORIDE 100 MG: 25 CAPSULE ORAL at 03:03

## 2023-03-26 RX ADMIN — ENOXAPARIN SODIUM 40 MG: 40 INJECTION SUBCUTANEOUS at 05:03

## 2023-03-26 RX ADMIN — THIAMINE HYDROCHLORIDE 250 MG: 100 INJECTION, SOLUTION INTRAMUSCULAR; INTRAVENOUS at 10:03

## 2023-03-26 RX ADMIN — ONDANSETRON 8 MG: 2 INJECTION INTRAMUSCULAR; INTRAVENOUS at 12:03

## 2023-03-26 RX ADMIN — LORAZEPAM 2 MG: 2 INJECTION INTRAMUSCULAR; INTRAVENOUS at 02:03

## 2023-03-26 RX ADMIN — SODIUM CHLORIDE, POTASSIUM CHLORIDE, SODIUM LACTATE AND CALCIUM CHLORIDE 1000 ML: 600; 310; 30; 20 INJECTION, SOLUTION INTRAVENOUS at 12:03

## 2023-03-26 RX ADMIN — CHLORDIAZEPOXIDE HYDROCHLORIDE 50 MG: 25 CAPSULE ORAL at 11:03

## 2023-03-26 RX ADMIN — LORAZEPAM 2 MG: 0.5 TABLET ORAL at 11:03

## 2023-03-26 RX ADMIN — DEXTROSE MONOHYDRATE AND SODIUM CHLORIDE: 5; .9 INJECTION, SOLUTION INTRAVENOUS at 11:03

## 2023-03-26 RX ADMIN — METOCLOPRAMIDE 10 MG: 5 INJECTION, SOLUTION INTRAMUSCULAR; INTRAVENOUS at 02:03

## 2023-03-26 NOTE — H&P
[facility] ([address] Medicine  History & Physical    Patient Name: Earl Abdul  MRN: [id number]  Patient Class: IP- Inpatient  Admission Date: 3/26/2023  Attending Physician: Luis Smith MD   Primary Care Provider: Andrew Rodriguez MD         Patient information was obtained from patient, spouse/SO, past medical records and ER records.     Subjective:     Principal Problem:Alcohol withdrawal    Chief Complaint:   Chief Complaint   Patient presents with    Alcohol Intoxication     Accompanied by . Drinking heavily for one week. Not eating x 1 week.         HPI: 62F with PMH EtOH use disorder, diabetes, COPD, hypertension, hypothyroidism, breast cancer, alcohol induced cirrhosis, sarcoidosis presents from home for alcohol withdrawal. Her  at bedside states he found her on the floor of their home this morning. He was able to arouse her and brought her to the ED. She reports her last drink was this morning around 10am. For the last week, she has been drinking one fifth of vodka per day. She has been to the hospital for withdrawal many times, and she has a history of withdrawal seizures about 6 years ago, according to patient and .    In the ED, patient is restless and mildly agitated with  and /76. Labs notable for WBC 13, CMP is within normal limits. EtOH level 253, lactate 2.4. Patient had nausea and vomiting in ED, received Zofran, metoclopramide, and 1L LR. Received Ativan 2mg for withdrawal symptoms. Admitted to medicine for management of alcohol withdrawal.       Past Medical History:   Diagnosis Date    Anemia     Anemia     Controlled type 2 diabetes mellitus without complication, without long-term current use of insulin 11/30/2021    COPD (chronic obstructive pulmonary disease)     Depression     Diverticulitis     Fatty liver     GERD (gastroesophageal reflux disease)     Hyperlipidemia     Hypertension     Pancreatitis      Peptic ulcer disease     Polysubstance abuse     Posterior reversible encephalopathy syndrome     Sarcoidosis of lung     Sarcoidosis of lung     over 30 yrs ago    Seizures     7/2017    Suicide attempt     Suicide ideation        Past Surgical History:   Procedure Laterality Date    APPENDECTOMY      BILATERAL MASTECTOMY Bilateral 10/29/2020    Procedure: MASTECTOMY, BILATERAL;  Surgeon: Baylee Kevin MD;  Location: [address];  Service: General;  Laterality: Bilateral;    BREAST REVISION SURGERY Bilateral 2/11/2021    Procedure: BREAST REVISION SURGERY;  Surgeon: Scottie Johnson MD;  Location: [address];  Service: Plastics;  Laterality: Bilateral;    COLONOSCOPY N/A 7/28/2017    Procedure: COLONOSCOPY;  Surgeon: Aaron Alvarado MD;  Location: [facility];  Service: Endoscopy;  Laterality: N/A;    ESOPHAGOGASTRODUODENOSCOPY  10/7/2016, 11/6/2014    2016 - gastritis, duodenitis, 2014 erosive gastritis    ESOPHAGOGASTRODUODENOSCOPY N/A 2/11/2020    Procedure: ESOPHAGOGASTRODUODENOSCOPY (EGD);  Surgeon: Fawn Garrido MD;  Location: [facility];  Service: Endoscopy;  Laterality: N/A;    ESOPHAGOGASTRODUODENOSCOPY N/A 4/19/2021    Procedure: EGD (ESOPHAGOGASTRODUODENOSCOPY);  Surgeon: Paramjit Martino MD;  Location: [facility];  Service: Endoscopy;  Laterality: N/A;    FLEXIBLE SIGMOIDOSCOPY  11/06/2014    colitis    HYSTERECTOMY      IMPLANTATION OF PERMANENT SACRAL NERVE STIMULATOR N/A 7/12/2022    Procedure: INSERTION, NEUROSTIMULATOR, PERMANENT, SACRAL;  Surgeon: Juaquin Edwards MD;  Location: [address];  Service: Urology;  Laterality: N/A;  1hr    INJECTION FOR SENTINEL NODE IDENTIFICATION Right 10/29/2020    Procedure: INJECTION, FOR SENTINEL NODE IDENTIFICATION;  Surgeon: Baylee Kevin MD;  Location: [address];  Service: General;  Laterality: Right;    INJECTION OF JOINT Right 10/10/2019    Procedure: Injection, Joint RIGHT ILIOPSOAS BURSA/TENDON  INJECTION AND RIGHT GLUTEAL TENDON INJECTION WITH STEROID AND LIDOCAINE;  Surgeon: Guillaume Rico MD;  Location: [facility];  Service: Pain Management;  Laterality: Right;  NEEDS CONSENT    INSERTION OF BREAST TISSUE EXPANDER Bilateral 10/29/2020    Procedure: INSERTION, TISSUE EXPANDER, BREAST;  Surgeon: Scottie Johnson MD;  Location: [facility] OR [address] [place] [address];  Service: Plastics;  Laterality: Bilateral;  Right breast: 1082 g  Left breast: 1076 g    LIPOSUCTION Bilateral 2/11/2021    Procedure: LIPOSUCTION;  Surgeon: Scottie Johnson MD;  [facility];  Service: Plastics;  Laterality: Bilateral;    mediastenoscopy      REPLACEMENT OF IMPLANT OF BREAST Bilateral 2/11/2021    Procedure: REPLACEMENT, IMPLANT, BREAST;  Surgeon: Scottie Johnson MD;  Location: [facility] OR [address] [place] [address];  Service: Plastics;  Laterality: Bilateral;    SENTINEL LYMPH NODE BIOPSY Right 10/29/2020    Procedure: BIOPSY, LYMPH NODE, SENTINEL;  Surgeon: Baylee Kevin MD;  Location: [address];  Service: General;  Laterality: Right;    TONSILLECTOMY N/A 1970    TUBAL LIGATION         Review of patient's allergies indicates:   Allergen Reactions    Lortab [hydrocodone-acetaminophen] Itching    Promethazine Itching and Other (See Comments)       Current Facility-Administered Medications on File Prior to Encounter   Medication    albuterol sulfate nebulizer solution 2.5 mg     Current Outpatient Medications on File Prior to Encounter   Medication Sig    acetaminophen (TYLENOL) 500 MG tablet Take 1,000 mg by mouth every 4 (four) hours as needed for Pain.    amLODIPine (NORVASC) 5 MG tablet Take 1 tablet (5 mg total) by mouth once daily.    anastrozole (ARIMIDEX) 1 mg Tab Take 1 tablet (1 mg total) by mouth every morning.    ARIPiprazole (ABILIFY) 5 MG Tab Take 5 mg by mouth once daily.    ATROVENT HFA 17 mcg/actuation inhaler Inhale 2 puffs into the lungs every 6 (six) hours as needed for Wheezing.    diazePAM  (VALIUM) 10 MG Tab Take 1 tablet (10 mg total) by mouth every 8 (eight) hours for 2 days, THEN 1 tablet (10 mg total) every 12 (twelve) hours for 2 days, THEN 0.5 tablet (5 mg total) every 12 (twelve) hours for 2 days, THEN 0.5 tablet (5 mg total) once daily for 2 days.    DULoxetine (CYMBALTA) 30 MG capsule Take 1 capsule (30 mg total) by mouth once daily. Take with 60mg =90mg QD    DULoxetine (CYMBALTA) 60 MG capsule TAKE 1 CAPSULE BY MOUTH EVERY DAY WITH LARGEST MEAL    folic acid (FOLVITE) 1 MG tablet Take 1 tablet (1 mg total) by mouth once daily.    gabapentin (NEURONTIN) 600 MG tablet Take 1 tablet (600 mg total) by mouth 2 (two) times daily.    levothyroxine (SYNTHROID) 50 MCG tablet Take 1 tablet (50 mcg total) by mouth before breakfast.    loperamide (IMODIUM) 2 mg capsule Take 2 mg by mouth 4 (four) times daily as needed for Diarrhea (Per package directions).    metFORMIN (GLUCOPHAGE-XR) 500 MG ER 24hr tablet Take 2 tablets (1,000 mg total) by mouth 2 (two) times daily with meals.    multivitamin (THERAGRAN) per tablet Take 1 tablet by mouth once daily.    nicotine (NICODERM CQ) 21 mg/24 hr Place 1 patch onto the skin once daily.    ondansetron (ZOFRAN ODT) 4 MG TbDL Take 1 tablet (4 mg total) by mouth every 6 (six) hours as needed (nausea).    pantoprazole (PROTONIX) 40 MG tablet Take 1 tablet (40 mg total) by mouth every morning.    propranoloL (INDERAL) 10 MG tablet Take 1 tablet (10 mg total) by mouth 2 (two) times daily. Hold medications until patient follows with PCP    sucralfate (CARAFATE) 100 mg/mL suspension Take 10 mLs (1 g total) by mouth 4 (four) times daily before meals and nightly.    thiamine 100 MG tablet Take 1 tablet (100 mg total) by mouth once daily.    traZODone (DESYREL) 300 MG tablet Take 1 tablet (300 mg total) by mouth every evening.     Family History       Problem Relation (Age of Onset)    Breast cancer Maternal Aunt, Daughter    Colon cancer Maternal Uncle     Diabetes Father, Mother    Heart attack Father    Hypertension Father, Mother          Tobacco Use    Smoking status: Former     Packs/day: 0.50     Years: 30.00     Pack years: 15.00     Types: Vaping with nicotine, Cigarettes     Quit date: 2021     Years since quittin.1    Smokeless tobacco: Never   Substance and Sexual Activity    Alcohol use: Yes     Comment: vodka daily (half a regular bottle)    Drug use: Yes     Types: Marijuana     Comment: gummies    Sexual activity: Yes     Birth control/protection: Surgical     Review of Systems   Constitutional:  Negative for chills, diaphoresis and fatigue.   HENT:  Negative for rhinorrhea and sore throat.    Eyes:  Negative for photophobia and pain.   Respiratory:  Negative for cough and shortness of breath.    Gastrointestinal:  Positive for nausea and vomiting. Negative for abdominal distention, abdominal pain and diarrhea.   Genitourinary:  Negative for difficulty urinating and dysuria.   Musculoskeletal:  Negative for arthralgias and myalgias.   Skin:  Negative for pallor and rash.   Neurological:  Negative for light-headedness and headaches.   Psychiatric/Behavioral:  Positive for agitation. Negative for confusion.    Objective:     Vital Signs (Most Recent):  Temp: 97.9 °F (36.6 °C) (23 1039)  Pulse: 100 (23 1440)  Resp: 18 (23 1440)  BP: (!) 162/76 (23 1440)  SpO2: 95 % (23 1440)   Vital Signs (24h Range):  Temp:  [97.9 °F (36.6 °C)] 97.9 °F (36.6 °C)  Pulse:  [] 100  Resp:  [16-18] 18  SpO2:  [85 %-98 %] 95 %  BP: (136-166)/(65-76) 162/76     Weight: 86.2 kg (190 lb)  Body mass index is 30.67 kg/m².    Physical Exam  Constitutional:       Appearance: She is ill-appearing. She is not diaphoretic.   HENT:      Head: Normocephalic and atraumatic.      Right Ear: External ear normal.      Left Ear: External ear normal.      Nose: Nose normal.      Mouth/Throat:      Mouth: Mucous membranes are moist.      Pharynx:  Oropharynx is clear.   Eyes:      General: No scleral icterus.     Extraocular Movements: Extraocular movements intact.      Conjunctiva/sclera: Conjunctivae normal.   Cardiovascular:      Rate and Rhythm: Regular rhythm. Tachycardia present.   Pulmonary:      Effort: Pulmonary effort is normal.      Breath sounds: Normal breath sounds. No wheezing or rales.   Abdominal:      General: Abdomen is flat. Bowel sounds are normal. There is no distension.      Palpations: Abdomen is soft.      Tenderness: There is no abdominal tenderness.   Musculoskeletal:         General: Normal range of motion.      Cervical back: Normal range of motion.      Right lower leg: No edema.      Left lower leg: No edema.   Skin:     General: Skin is warm and dry.   Neurological:      General: No focal deficit present.      Mental Status: She is alert.   Psychiatric:         Mood and Affect: Mood is anxious.         Behavior: Behavior is agitated.      Comments: CIWA 16           Significant Labs: All pertinent labs within the past 24 hours have been reviewed.    Significant Imaging: I have reviewed all pertinent imaging results/findings within the past 24 hours.    Assessment/Plan:     * Alcohol withdrawal  62F with hx EtOH use disorder, DM2, HTN, depression/anxiety presents after 1-week binge of 1 fifth vodka/day. BIB  after her found her unconscious, was able to wake her and brought her to ED. Multiple presentations for withdrawal previously. EtOH level 253, lactate 2.4.     -seizure precautions, aspiration precautions, fall precautions  -Librium taper with PRN Ativan 2mg for CIWA>8 or withdrawal symptoms  -D5NS infusion for lactate, vomiting  -f/u repeat lactate  -continue home thiamine and folate supplementation, multivitamin        Alcohol use disorder, severe, dependence  Longstanding EtOH use disorder, would benefit from community resources for alcohol cessation.    -consider acamprosate on discharge      COPD (chronic  obstructive pulmonary disease)  Continue home ipratropium PRN      Type 2 diabetes mellitus with hyperglycemia  Patient's FSGs are controlled on current medication regimen.  Last A1c reviewed-   Lab Results   Component Value Date    HGBA1C 4.6 12/23/2022     Most recent fingerstick glucose reviewed-   Recent Labs   Lab 03/26/23  1220 03/26/23  1338   POCTGLUCOSE 58* 87     Current correctional scale  Low  Maintain anti-hyperglycemic dose as follows-   Antihyperglycemics (From admission, onward)    Start     Stop Route Frequency Ordered    03/26/23 1713  insulin aspart U-100 pen 0-5 Units         -- SubQ Before meals & nightly PRN 03/26/23 1613        Hold Oral hypoglycemics while patient is in the hospital.    Essential hypertension  Continue home amlodipine       Depression with anxiety  Status of psychiatric conditions is difficult to elucidate due to alcohol use disorder.     -continue home aripiprazole, duloxetine        Hypothyroidism  Continue home levothyroxine        VTE Risk Mitigation (From admission, onward)         Ordered     enoxaparin injection 40 mg  Daily         03/26/23 1634     IP VTE HIGH RISK PATIENT  Once         03/26/23 1446     Place sequential compression device  Until discontinued         03/26/23 1446                           MERISSA JACKSON MD  Department of Hospital Medicine  [facility] - Intensive Care ([place])

## 2023-03-26 NOTE — ED NOTES
2 IV attempts made, some of the blood work obtained, No IV obtained. Pt sent to CT will attempt IV access again.

## 2023-03-26 NOTE — ASSESSMENT & PLAN NOTE
62F with hx EtOH use disorder, DM2, HTN, depression/anxiety presents after 1-week binge of 1 fifth vodka/day. BIB  after her found her unconscious, was able to wake her and brought her to ED. Multiple presentations for withdrawal previously. EtOH level 253, lactate 2.4.     -seizure precautions, aspiration precautions, fall precautions  -Librium taper with PRN Ativan 2mg for CIWA>8 or withdrawal symptoms  -D5NS infusion for lactate, vomiting  -f/u repeat lactate  -continue home thiamine and folate supplementation, multivitamin

## 2023-03-26 NOTE — ASSESSMENT & PLAN NOTE
Status of psychiatric conditions is difficult to elucidate due to alcohol use disorder.     -continue home aripiprazole, duloxetine

## 2023-03-26 NOTE — HPI
62F with PMH EtOH use disorder, diabetes, COPD, hypertension, hypothyroidism, breast cancer, alcohol induced cirrhosis, sarcoidosis presents from home for alcohol withdrawal. Her  at bedside states he found her on the floor of their home this morning. He was able to arouse her and brought her to the ED. She reports her last drink was this morning around 10am. For the last week, she has been drinking one fifth of vodka per day. She has been to the hospital for withdrawal many times, and she has a history of withdrawal seizures about 6 years ago, according to patient and .    In the ED, patient is restless and mildly agitated with  and /76. Labs notable for WBC 13, CMP is within normal limits. EtOH level 253, lactate 2.4. Patient had nausea and vomiting in ED, received Zofran, metoclopramide, and 1L LR. Received Ativan 2mg for withdrawal symptoms. Admitted to medicine for management of alcohol withdrawal.

## 2023-03-26 NOTE — SUBJECTIVE & OBJECTIVE
Past Medical History:   Diagnosis Date    Anemia     Anemia     Controlled type 2 diabetes mellitus without complication, without long-term current use of insulin 11/30/2021    COPD (chronic obstructive pulmonary disease)     Depression     Diverticulitis     Fatty liver     GERD (gastroesophageal reflux disease)     Hyperlipidemia     Hypertension     Pancreatitis     Peptic ulcer disease     Polysubstance abuse     Posterior reversible encephalopathy syndrome     Sarcoidosis of lung     Sarcoidosis of lung     over 30 yrs ago    Seizures     7/2017    Suicide attempt     Suicide ideation        Past Surgical History:   Procedure Laterality Date    APPENDECTOMY      BILATERAL MASTECTOMY Bilateral 10/29/2020    Procedure: MASTECTOMY, BILATERAL;  Surgeon: Baylee Kevin MD;  Location: [address];  Service: General;  Laterality: Bilateral;    BREAST REVISION SURGERY Bilateral 2/11/2021    Procedure: BREAST REVISION SURGERY;  Surgeon: Scottie Johnson MD;  Location: [facility] OR [address] [place] [address];  Service: Plastics;  Laterality: Bilateral;    COLONOSCOPY N/A 7/28/2017    Procedure: COLONOSCOPY;  Surgeon: Aaron Alvarado MD;  Location: [facility];  Service: Endoscopy;  Laterality: N/A;    ESOPHAGOGASTRODUODENOSCOPY  10/7/2016, 11/6/2014    2016 - gastritis, duodenitis, 2014 erosive gastritis    ESOPHAGOGASTRODUODENOSCOPY N/A 2/11/2020    Procedure: ESOPHAGOGASTRODUODENOSCOPY (EGD);  Surgeon: Fawn Garrido MD;  Location: [facility];  Service: Endoscopy;  Laterality: N/A;    ESOPHAGOGASTRODUODENOSCOPY N/A 4/19/2021    Procedure: EGD (ESOPHAGOGASTRODUODENOSCOPY);  Surgeon: Paramjit Martino MD;  Location: [facility];  Service: Endoscopy;  Laterality: N/A;    FLEXIBLE SIGMOIDOSCOPY  11/06/2014    colitis    HYSTERECTOMY      IMPLANTATION OF PERMANENT SACRAL NERVE STIMULATOR N/A 7/12/2022    Procedure: INSERTION, NEUROSTIMULATOR, PERMANENT, SACRAL;  Surgeon: Juaquin Edwards MD;  Location: [facility] OR [address] [place] [address];  Service:  Urology;  Laterality: N/A;  1hr    INJECTION FOR SENTINEL NODE IDENTIFICATION Right 10/29/2020    Procedure: INJECTION, FOR SENTINEL NODE IDENTIFICATION;  Surgeon: Baylee Kevin MD;  [facility];  Service: General;  Laterality: Right;    INJECTION OF JOINT Right 10/10/2019    Procedure: Injection, Joint RIGHT ILIOPSOAS BURSA/TENDON INJECTION AND RIGHT GLUTEAL TENDON INJECTION WITH STEROID AND LIDOCAINE;  Surgeon: Guillaume Rico MD;  Location: [facility];  Service: Pain Management;  Laterality: Right;  NEEDS CONSENT    INSERTION OF BREAST TISSUE EXPANDER Bilateral 10/29/2020    Procedure: INSERTION, TISSUE EXPANDER, BREAST;  Surgeon: Scottie Johnson MD;  Location: [facility] OR [address] [place] [address];  Service: Plastics;  Laterality: Bilateral;  Right breast: 1082 g  Left breast: 1076 g    LIPOSUCTION Bilateral 2/11/2021    Procedure: LIPOSUCTION;  Surgeon: Scottie Johnson MD;  Location: [facility] OR [address] [place] [address];  Service: Plastics;  Laterality: Bilateral;    mediastenoscopy      REPLACEMENT OF IMPLANT OF BREAST Bilateral 2/11/2021    Procedure: REPLACEMENT, IMPLANT, BREAST;  Surgeon: Scottie Johnson MD;  Location: [address];  Service: Plastics;  Laterality: Bilateral;    SENTINEL LYMPH NODE BIOPSY Right 10/29/2020    Procedure: BIOPSY, LYMPH NODE, SENTINEL;  Surgeon: Baylee Kevin MD;  Location: [address];  Service: General;  Laterality: Right;    TONSILLECTOMY N/A 1970    TUBAL LIGATION         Review of patient's allergies indicates:   Allergen Reactions    Lortab [hydrocodone-acetaminophen] Itching    Promethazine Itching and Other (See Comments)       Current Facility-Administered Medications on File Prior to Encounter   Medication    albuterol sulfate nebulizer solution 2.5 mg     Current Outpatient Medications on File Prior to Encounter   Medication Sig    acetaminophen (TYLENOL) 500 MG tablet Take 1,000 mg by mouth every 4 (four) hours as needed for Pain.    amLODIPine (NORVASC) 5 MG  tablet Take 1 tablet (5 mg total) by mouth once daily.    anastrozole (ARIMIDEX) 1 mg Tab Take 1 tablet (1 mg total) by mouth every morning.    ARIPiprazole (ABILIFY) 5 MG Tab Take 5 mg by mouth once daily.    ATROVENT HFA 17 mcg/actuation inhaler Inhale 2 puffs into the lungs every 6 (six) hours as needed for Wheezing.    diazePAM (VALIUM) 10 MG Tab Take 1 tablet (10 mg total) by mouth every 8 (eight) hours for 2 days, THEN 1 tablet (10 mg total) every 12 (twelve) hours for 2 days, THEN 0.5 tablet (5 mg total) every 12 (twelve) hours for 2 days, THEN 0.5 tablet (5 mg total) once daily for 2 days.    DULoxetine (CYMBALTA) 30 MG capsule Take 1 capsule (30 mg total) by mouth once daily. Take with 60mg =90mg QD    DULoxetine (CYMBALTA) 60 MG capsule TAKE 1 CAPSULE BY MOUTH EVERY DAY WITH LARGEST MEAL    folic acid (FOLVITE) 1 MG tablet Take 1 tablet (1 mg total) by mouth once daily.    gabapentin (NEURONTIN) 600 MG tablet Take 1 tablet (600 mg total) by mouth 2 (two) times daily.    levothyroxine (SYNTHROID) 50 MCG tablet Take 1 tablet (50 mcg total) by mouth before breakfast.    loperamide (IMODIUM) 2 mg capsule Take 2 mg by mouth 4 (four) times daily as needed for Diarrhea (Per package directions).    metFORMIN (GLUCOPHAGE-XR) 500 MG ER 24hr tablet Take 2 tablets (1,000 mg total) by mouth 2 (two) times daily with meals.    multivitamin (THERAGRAN) per tablet Take 1 tablet by mouth once daily.    nicotine (NICODERM CQ) 21 mg/24 hr Place 1 patch onto the skin once daily.    ondansetron (ZOFRAN ODT) 4 MG TbDL Take 1 tablet (4 mg total) by mouth every 6 (six) hours as needed (nausea).    pantoprazole (PROTONIX) 40 MG tablet Take 1 tablet (40 mg total) by mouth every morning.    propranoloL (INDERAL) 10 MG tablet Take 1 tablet (10 mg total) by mouth 2 (two) times daily. Hold medications until patient follows with PCP    sucralfate (CARAFATE) 100 mg/mL suspension Take 10 mLs (1 g total) by mouth 4 (four) times daily before  meals and nightly.    thiamine 100 MG tablet Take 1 tablet (100 mg total) by mouth once daily.    traZODone (DESYREL) 300 MG tablet Take 1 tablet (300 mg total) by mouth every evening.     Family History       Problem Relation (Age of Onset)    Breast cancer Maternal Aunt, Daughter    Colon cancer Maternal Uncle    Diabetes Father, Mother    Heart attack Father    Hypertension Father, Mother          Tobacco Use    Smoking status: Former     Packs/day: 0.50     Years: 30.00     Pack years: 15.00     Types: Vaping with nicotine, Cigarettes     Quit date: 2021     Years since quittin.1    Smokeless tobacco: Never   Substance and Sexual Activity    Alcohol use: Yes     Comment: vodka daily (half a regular bottle)    Drug use: Yes     Types: Marijuana     Comment: gummies    Sexual activity: Yes     Birth control/protection: Surgical     Review of Systems   Constitutional:  Negative for chills, diaphoresis and fatigue.   HENT:  Negative for rhinorrhea and sore throat.    Eyes:  Negative for photophobia and pain.   Respiratory:  Negative for cough and shortness of breath.    Gastrointestinal:  Positive for nausea and vomiting. Negative for abdominal distention, abdominal pain and diarrhea.   Genitourinary:  Negative for difficulty urinating and dysuria.   Musculoskeletal:  Negative for arthralgias and myalgias.   Skin:  Negative for pallor and rash.   Neurological:  Negative for light-headedness and headaches.   Psychiatric/Behavioral:  Positive for agitation. Negative for confusion.    Objective:     Vital Signs (Most Recent):  Temp: 97.9 °F (36.6 °C) (23 1039)  Pulse: 100 (23 1440)  Resp: 18 (23 1440)  BP: (!) 162/76 (23 1440)  SpO2: 95 % (23 1440)   Vital Signs (24h Range):  Temp:  [97.9 °F (36.6 °C)] 97.9 °F (36.6 °C)  Pulse:  [] 100  Resp:  [16-18] 18  SpO2:  [85 %-98 %] 95 %  BP: (136-166)/(65-76) 162/76     Weight: 86.2 kg (190 lb)  Body mass index is 30.67  kg/m².    Physical Exam  Constitutional:       Appearance: She is ill-appearing. She is not diaphoretic.   HENT:      Head: Normocephalic and atraumatic.      Right Ear: External ear normal.      Left Ear: External ear normal.      Nose: Nose normal.      Mouth/Throat:      Mouth: Mucous membranes are moist.      Pharynx: Oropharynx is clear.   Eyes:      General: No scleral icterus.     Extraocular Movements: Extraocular movements intact.      Conjunctiva/sclera: Conjunctivae normal.   Cardiovascular:      Rate and Rhythm: Regular rhythm. Tachycardia present.   Pulmonary:      Effort: Pulmonary effort is normal.      Breath sounds: Normal breath sounds. No wheezing or rales.   Abdominal:      General: Abdomen is flat. Bowel sounds are normal. There is no distension.      Palpations: Abdomen is soft.      Tenderness: There is no abdominal tenderness.   Musculoskeletal:         General: Normal range of motion.      Cervical back: Normal range of motion.      Right lower leg: No edema.      Left lower leg: No edema.   Skin:     General: Skin is warm and dry.   Neurological:      General: No focal deficit present.      Mental Status: She is alert.   Psychiatric:         Mood and Affect: Mood is anxious.         Behavior: Behavior is agitated.      Comments: WA 16           Significant Labs: All pertinent labs within the past 24 hours have been reviewed.    Significant Imaging: I have reviewed all pertinent imaging results/findings within the past 24 hours.

## 2023-03-26 NOTE — ED NOTES
Pt found in room out of bed relays she does not want to stay in the hospital and to take her IV out. Pt assisted back in to bed, talked about plan of care. MD notified.

## 2023-03-26 NOTE — ASSESSMENT & PLAN NOTE
Patient's FSGs are controlled on current medication regimen.  Last A1c reviewed-   Lab Results   Component Value Date    HGBA1C 4.6 12/23/2022     Most recent fingerstick glucose reviewed-   Recent Labs   Lab 03/26/23  1220 03/26/23  1338   POCTGLUCOSE 58* 87     Current correctional scale  Low  Maintain anti-hyperglycemic dose as follows-   Antihyperglycemics (From admission, onward)    Start     Stop Route Frequency Ordered    03/26/23 1713  insulin aspart U-100 pen 0-5 Units         -- SubQ Before meals & nightly PRN 03/26/23 1613        Hold Oral hypoglycemics while patient is in the hospital.

## 2023-03-26 NOTE — ASSESSMENT & PLAN NOTE
Longstanding EtOH use disorder, would benefit from community resources for alcohol cessation.    -consider acamprosate on discharge

## 2023-03-26 NOTE — ED TRIAGE NOTES
Pt to the ed from home with  with a CC of alcohol misuse. Pt was found by her  passed out.  relays he was unable to wake her up so he brought her to the ED. Pt is lethargic and responsive to loud verbal stimuli .

## 2023-03-26 NOTE — ED PROVIDER NOTES
"Encounter Date: 3/26/2023       History     Chief Complaint   Patient presents with    Alcohol Intoxication     Accompanied by . Drinking heavily for one week. Not eating x 1 week.      62-year-old female, history of diabetes, COPD, hypertension, alcohol induced cirrhosis, sarcoidosis, brought in by  secondary to concerns for severe alcohol intoxication and unresponsiveness this morning.  Patient has a history of severe alcoholism and has had several visits recently for this.   reports that over the last week and a half he is tried to have strict boundaries with the patient and has become less involved in monitoring her day-to-day life.  Patient has begun drinking a full bottle of alcohol daily and has had nothing to eat or drink beyond this in the last week.  This morning he found her on the ground and was minimally responsive.  On my assessment, patient is able to answer some questions and when I ask her what is bothering her today she says, "you are."    The history is provided by the spouse and the patient. The history is limited by the condition of the patient.   Review of patient's allergies indicates:   Allergen Reactions    Lortab [hydrocodone-acetaminophen] Itching    Promethazine Itching and Other (See Comments)     Past Medical History:   Diagnosis Date    Anemia     Anemia     Controlled type 2 diabetes mellitus without complication, without long-term current use of insulin 11/30/2021    COPD (chronic obstructive pulmonary disease)     Depression     Diverticulitis     Fatty liver     GERD (gastroesophageal reflux disease)     Hyperlipidemia     Hypertension     Pancreatitis     Peptic ulcer disease     Polysubstance abuse     Posterior reversible encephalopathy syndrome     Sarcoidosis of lung     Sarcoidosis of lung     over 30 yrs ago    Seizures     7/2017    Suicide attempt     Suicide ideation      Past Surgical History:   Procedure Laterality Date    APPENDECTOMY      " BILATERAL MASTECTOMY Bilateral 10/29/2020    Procedure: MASTECTOMY, BILATERAL;  Surgeon: Baylee Kevin MD;  [facility];  Service: General;  Laterality: Bilateral;    BREAST REVISION SURGERY Bilateral 2/11/2021    Procedure: BREAST REVISION SURGERY;  Surgeon: Scottie Johnson MD;  [facility];  Service: Plastics;  Laterality: Bilateral;    COLONOSCOPY N/A 7/28/2017    Procedure: COLONOSCOPY;  Surgeon: Aaron Alvarado MD;  Location: [facility];  Service: Endoscopy;  Laterality: N/A;    ESOPHAGOGASTRODUODENOSCOPY  10/7/2016, 11/6/2014    2016 - gastritis, duodenitis, 2014 erosive gastritis    ESOPHAGOGASTRODUODENOSCOPY N/A 2/11/2020    Procedure: ESOPHAGOGASTRODUODENOSCOPY (EGD);  Surgeon: Fawn Garrido MD;  Location: [facility];  Service: Endoscopy;  Laterality: N/A;    ESOPHAGOGASTRODUODENOSCOPY N/A 4/19/2021    Procedure: EGD (ESOPHAGOGASTRODUODENOSCOPY);  Surgeon: Paramjit Martino MD;  Location: [facility];  Service: Endoscopy;  Laterality: N/A;    FLEXIBLE SIGMOIDOSCOPY  11/06/2014    colitis    HYSTERECTOMY      IMPLANTATION OF PERMANENT SACRAL NERVE STIMULATOR N/A 7/12/2022    Procedure: INSERTION, NEUROSTIMULATOR, PERMANENT, SACRAL;  Surgeon: Juaquin Edwards MD;  Location: [facility] OR [address] [place] [address];  Service: Urology;  Laterality: N/A;  1hr    INJECTION FOR SENTINEL NODE IDENTIFICATION Right 10/29/2020    Procedure: INJECTION, FOR SENTINEL NODE IDENTIFICATION;  Surgeon: Baylee Kevin MD;  [facility];  Service: General;  Laterality: Right;    INJECTION OF JOINT Right 10/10/2019    Procedure: Injection, Joint RIGHT ILIOPSOAS BURSA/TENDON INJECTION AND RIGHT GLUTEAL TENDON INJECTION WITH STEROID AND LIDOCAINE;  Surgeon: Guillaume Rico MD;  Location: [facility];  Service: Pain Management;  Laterality: Right;  NEEDS CONSENT    INSERTION OF BREAST TISSUE EXPANDER Bilateral 10/29/2020    Procedure: INSERTION, TISSUE EXPANDER, BREAST;  Surgeon: Scottie Johnson,  MD;  Location: [address];  Service: Plastics;  Laterality: Bilateral;  Right breast: 1082 g  Left breast: 1076 g    LIPOSUCTION Bilateral 2021    Procedure: LIPOSUCTION;  Surgeon: Scottie Johnson MD;  Location: [address];  Service: Plastics;  Laterality: Bilateral;    mediastenoscopy      REPLACEMENT OF IMPLANT OF BREAST Bilateral 2021    Procedure: REPLACEMENT, IMPLANT, BREAST;  Surgeon: Scottie Johnson MD;  Location: [address];  Service: Plastics;  Laterality: Bilateral;    SENTINEL LYMPH NODE BIOPSY Right 10/29/2020    Procedure: BIOPSY, LYMPH NODE, SENTINEL;  Surgeon: Baylee Kevin MD;  Location: [address];  Service: General;  Laterality: Right;    TONSILLECTOMY N/A     TUBAL LIGATION       Family History   Problem Relation Age of Onset    Heart attack Father     Diabetes Father     Hypertension Father     Diabetes Mother     Hypertension Mother     Breast cancer Maternal Aunt     Colon cancer Maternal Uncle     Breast cancer Daughter     Ovarian cancer Neg Hx     Cancer Neg Hx      Social History     Tobacco Use    Smoking status: Former     Packs/day: 0.50     Years: 30.00     Pack years: 15.00     Types: Vaping with nicotine, Cigarettes     Quit date: 2021     Years since quittin.1    Smokeless tobacco: Never   Substance Use Topics    Alcohol use: Yes     Comment: vodka daily (half a regular bottle)    Drug use: Yes     Types: Marijuana     Comment: gummies     Review of Systems   Unable to perform ROS: Mental status change     Physical Exam     Initial Vitals [23 1039]   BP Pulse Resp Temp SpO2   136/68 102 16 97.9 °F (36.6 °C) (!) 85 %      MAP       --         Physical Exam    Nursing note and vitals reviewed.  Constitutional: Vital signs are normal. She appears lethargic.  Non-toxic appearance. She does not appear ill. No distress.   HENT:   Mouth/Throat: Mucous membranes are normal. Mucous membranes are not dry.   No signs of head  trauma  MM dry   Eyes: Conjunctivae and lids are normal. Pupils are equal, round, and reactive to light.   Neck: Neck supple.   Normal range of motion.  Cardiovascular:  Normal rate.           Pulmonary/Chest: Breath sounds normal. No respiratory distress.   Hypoxic at 85% on RA, placed on 2L NC   Abdominal: Abdomen is soft. She exhibits no distension. There is no abdominal tenderness. There is no rebound and no guarding.   Musculoskeletal:         General: No tenderness or edema. Normal range of motion.      Cervical back: Normal range of motion and neck supple.     Neurological: She is oriented to person, place, and time. She appears lethargic.   Patient is lethargic but arousable to verbal stimuli and able to answer simple questions appropriately, follows commands, has no focal weakness   Skin: Skin is warm, dry and intact. No rash noted. No pallor.   Psychiatric: Her speech is slurred. Cognition and memory are impaired.       ED Course   Procedures  Labs Reviewed   CBC W/ AUTO DIFFERENTIAL - Abnormal; Notable for the following components:       Result Value    WBC 13.01 (*)     MCHC 29.1 (*)     RDW 18.6 (*)     Platelets 109 (*)     Gran % 16.0 (*)     Lymph % 84.0 (*)     Mono % 0.0 (*)     All other components within normal limits   ALCOHOL,MEDICAL (ETHANOL) - Abnormal; Notable for the following components:    Alcohol, Serum 253 (*)     All other components within normal limits   LACTIC ACID, PLASMA - Abnormal; Notable for the following components:    Lactate (Lactic Acid) 2.4 (*)     All other components within normal limits   COMPREHENSIVE METABOLIC PANEL - Abnormal; Notable for the following components:    CO2 22 (*)     Calcium 8.3 (*)     Alkaline Phosphatase 44 (*)     AST 44 (*)     Anion Gap 17 (*)     All other components within normal limits   MAGNESIUM - Abnormal; Notable for the following components:    Magnesium 1.5 (*)     All other components within normal limits    Narrative:     Add on LIPAS  MG per RN NICOLLE Epic order [id number]  14:32    03/26/2023    ISTAT PROCEDURE - Abnormal; Notable for the following components:    POC PH 7.313 (*)     POC PCO2 53.0 (*)     POC PO2 36 (*)     POC SATURATED O2 63 (*)     All other components within normal limits   POCT GLUCOSE - Abnormal; Notable for the following components:    POCT Glucose 58 (*)     All other components within normal limits   PROTIME-INR   B-TYPE NATRIURETIC PEPTIDE   LIPASE   MAGNESIUM   LIPASE    Narrative:     Add on LIPAS MG per RN NICOLLE Epic order [id number]  14:32    03/26/2023    B-TYPE NATRIURETIC PEPTIDE   VITAMIN B1   POCT GLUCOSE          Imaging Results              CT Head Without Contrast (Final result)  Result time 03/26/23 12:10:16      Final result by Kevin Miller MD (03/26/23 12:10:16)                   Impression:      No acute intracranial findings.      Electronically signed by: Kevin Miller  Date:    03/26/2023  Time:    12:10               Narrative:    EXAMINATION:  CT HEAD WITHOUT CONTRAST    CLINICAL HISTORY:  Head trauma, abnormal mental status (Age 19-64y);    TECHNIQUE:  Low dose axial images were obtained through the head.  Coronal and sagittal reformations were also performed. Contrast was not administered.    COMPARISON:  CT head performed 11/20/2022.    FINDINGS:  Blood: No acute intracranial hemorrhage.    Parenchyma: No definite loss of gray-white differentiation to suggest acute or subacute transcortical infarct. Parenchymal volume loss.  Nonspecific areas of white matter hypoattenuation may relate to sequela of chronic small vessel ischemic disease.  Prominent vascular space versus remote lacunar type infarct inferior left basal ganglia.    Ventricles/Extra-axial spaces: No abnormal extra-axial fluid collection. Basal cisterns are patent.    Vessels: Mild atherosclerotic calcification.    Orbits: Unremarkable.    Scalp: Unremarkable.    Skull: Unremarkable.    Sinuses and mastoids: Relatively  modest paranasal sinus mucosal thickening.  Nonspecific partial opacification of the right mastoid air cells and middle ear cavity.  This appears new since 11/20/2022 head CT.    Other findings: None                                       X-Ray Chest AP Portable (Final result)  Result time 03/26/23 11:51:14      Final result by Kevin Miller MD (03/26/23 11:51:14)                   Impression:      Suspect bibasilar atelectasis.  No convincing evidence of acute cardiopulmonary disease.      Electronically signed by: Kevin Miller  Date:    03/26/2023  Time:    11:51               Narrative:    EXAMINATION:  XR CHEST AP PORTABLE    CLINICAL HISTORY:  hypoxia;    TECHNIQUE:  Single frontal view of the chest was performed.    COMPARISON:  Chest radiograph performed 12/23/2022, 04:50 hours.    FINDINGS:  Grossly unchanged cardiomediastinal contours again noting enlargement of the cardiac silhouette.  Prominence of the central pulmonary vasculature.    Allowing for attenuation due to bilateral breast implants, the lungs are felt grossly clear, excepting suspected bibasilar atelectasis.    No definite pneumothorax or large volume pleural effusion.    No acute findings the visualized abdomen.    Osseous and soft tissue structures without definite acute change.  Surgical clips again project in the left chest wall.                                       Medications   sodium chloride 0.9% flush 10 mL (has no administration in time range)   melatonin tablet 6 mg (6 mg Oral Given 3/27/23 0046)   folic acid tablet 1 mg (1 mg Oral Given 3/27/23 0856)   amLODIPine tablet 5 mg (5 mg Oral Given 3/27/23 0857)   ARIPiprazole tablet 5 mg (5 mg Oral Given 3/27/23 0805)   ipratropium inhaler 2 puff (has no administration in time range)   DULoxetine DR capsule 30 mg (30 mg Oral Given 3/27/23 0805)   levothyroxine tablet 50 mcg (50 mcg Oral Given 3/27/23 0604)   multivitamin tablet (1 tablet Oral Given 3/27/23 0857)   nicotine 21 mg/24  hr 1 patch (1 patch Transdermal Patch Applied 3/27/23 0857)   thiamine tablet 100 mg (100 mg Oral Given 3/27/23 0857)   glucagon (human recombinant) injection 1 mg (has no administration in time range)   dextrose 10% bolus 125 mL 125 mL (has no administration in time range)   dextrose 10% bolus 250 mL 250 mL (has no administration in time range)   dextrose 40 % gel 15,000 mg (has no administration in time range)   dextrose 40 % gel 30,000 mg (has no administration in time range)   insulin aspart U-100 pen 0-5 Units (has no administration in time range)   ondansetron disintegrating tablet 4 mg (4 mg Oral Given 3/27/23 0805)   LORazepam tablet 2 mg (2 mg Oral Given 3/27/23 1410)   enoxaparin injection 40 mg (40 mg Subcutaneous Given 3/26/23 1724)   magnesium sulfate 2g in water 50mL IVPB (premix) (has no administration in time range)   potassium, sodium phosphates 280-160-250 mg packet 1 packet (1 packet Oral Given 3/27/23 1032)   dextrose 5 % and 0.9 % NaCl infusion (has no administration in time range)   chlordiazepoxide capsule 100 mg (100 mg Oral Given 3/26/23 1550)     Followed by   chlordiazepoxide capsule 50 mg (50 mg Oral Given 3/27/23 1033)   losartan tablet 50 mg (50 mg Oral Given 3/27/23 1410)   lactated ringers bolus 1,000 mL (0 mLs Intravenous Stopped 3/26/23 1349)   ondansetron injection 8 mg (8 mg Intravenous Given 3/26/23 1250)   LORazepam injection 2 mg (2 mg Intravenous Given 3/26/23 1426)   metoclopramide HCl injection 10 mg (10 mg Intravenous Given 3/26/23 1427)   thiamine (B-1) 250 mg in dextrose 5 % (D5W) 100 mL IVPB (0 mg Intravenous Stopped 3/26/23 2233)     Medical Decision Making:   History:   Old Medical Records: I decided to obtain old medical records.  Old Records Summarized: records from clinic visits and records from previous admission(s).  Differential Diagnosis:   Patient is acutely demonstrating signs of acute, global encephalopathy.  DDx would include metabolic crisis/electrolyte  derangement, infection, CNS process like ICH or CVA or meningitis/encephalitis, seizures/post-ictal state or less likely NCSE, polypharmacy, drug overdose/intoxication, withdrawal syndrome, vitamin deficiency, hepatic encephalopathy, uremia.    At this point in time, I have a high index of suspicion for etoh intoxication as the etiology of the patient's AMS.    The following tests/interventions will be ordered to determine the etiology of the patient's AMS:   -Labs CBC, CMP, mag, etoh level, thiamine level  -Imaging CT head given possible fall, found on ground and CXR given hypoxia though pt supposed to be on 2-3L NC at home 2/2 COPD  -Medication review  -Close monitoring and frequent neuro checks    -Disposition: likely admit    Independently Interpreted Test(s):   I have ordered and independently interpreted X-rays - see prior notes.  I have ordered and independently interpreted EKG Reading(s) - see prior notes  Clinical Tests:   Lab Tests: Ordered and Reviewed  Radiological Study: Reviewed and Ordered  Medical Tests: Ordered and Reviewed           ED Course as of 03/27/23 1411   Sun Mar 26, 2023   1406 CO2(!): 22 [AS]   1406 Anion Gap(!): 17  Mild AKA.   [AS]   1406 Alcohol, Serum(!): 253 [AS]   1406 Lactate, Raymond(!): 2.4 [AS]   1406 On clinical reassessment, patient now actively vomiting, tremulous, heart rate in the 130s.  She seems to be going through withdrawal now.  Given her lab abnormalities, active nausea and vomiting, dehydration, signs of withdrawal, will admit to medicine for further management. [AS]      ED Course User Index  [AS] Eun Mccoy MD                 Clinical Impression:   Final diagnoses:  [F10.20] Alcoholism (Primary)        ED Disposition Condition    Admit Stable                Eun Mccoy MD  03/27/23 1412

## 2023-03-27 LAB
ALBUMIN SERPL BCP-MCNC: 3.3 G/DL (ref 3.5–5.2)
ALP SERPL-CCNC: 43 U/L (ref 55–135)
ALT SERPL W/O P-5'-P-CCNC: 15 U/L (ref 10–44)
ANION GAP SERPL CALC-SCNC: 7 MMOL/L (ref 8–16)
AST SERPL-CCNC: 27 U/L (ref 10–40)
BASOPHILS # BLD AUTO: 0.01 K/UL (ref 0–0.2)
BASOPHILS NFR BLD: 0.2 % (ref 0–1.9)
BILIRUB SERPL-MCNC: 0.7 MG/DL (ref 0.1–1)
BUN SERPL-MCNC: 8 MG/DL (ref 8–23)
CALCIUM SERPL-MCNC: 8 MG/DL (ref 8.7–10.5)
CHLORIDE SERPL-SCNC: 104 MMOL/L (ref 95–110)
CO2 SERPL-SCNC: 27 MMOL/L (ref 23–29)
CREAT SERPL-MCNC: 0.7 MG/DL (ref 0.5–1.4)
DIFFERENTIAL METHOD: ABNORMAL
EOSINOPHIL # BLD AUTO: 0 K/UL (ref 0–0.5)
EOSINOPHIL NFR BLD: 0.7 % (ref 0–8)
ERYTHROCYTE [DISTWIDTH] IN BLOOD BY AUTOMATED COUNT: 17.9 % (ref 11.5–14.5)
EST. GFR  (NO RACE VARIABLE): >60 ML/MIN/1.73 M^2
GLUCOSE SERPL-MCNC: 96 MG/DL (ref 70–110)
HCT VFR BLD AUTO: 34 % (ref 37–48.5)
HGB BLD-MCNC: 10 G/DL (ref 12–16)
IMM GRANULOCYTES # BLD AUTO: 0.01 K/UL (ref 0–0.04)
IMM GRANULOCYTES NFR BLD AUTO: 0.2 % (ref 0–0.5)
LYMPHOCYTES # BLD AUTO: 2.7 K/UL (ref 1–4.8)
LYMPHOCYTES NFR BLD: 61 % (ref 18–48)
MAGNESIUM SERPL-MCNC: 1.5 MG/DL (ref 1.6–2.6)
MCH RBC QN AUTO: 27.3 PG (ref 27–31)
MCHC RBC AUTO-ENTMCNC: 29.4 G/DL (ref 32–36)
MCV RBC AUTO: 93 FL (ref 82–98)
MONOCYTES # BLD AUTO: 0.4 K/UL (ref 0.3–1)
MONOCYTES NFR BLD: 8.8 % (ref 4–15)
NEUTROPHILS # BLD AUTO: 1.3 K/UL (ref 1.8–7.7)
NEUTROPHILS NFR BLD: 29.1 % (ref 38–73)
NRBC BLD-RTO: 0 /100 WBC
PHOSPHATE SERPL-MCNC: 2 MG/DL (ref 2.7–4.5)
PLATELET # BLD AUTO: 72 K/UL (ref 150–450)
PMV BLD AUTO: 9.7 FL (ref 9.2–12.9)
POCT GLUCOSE: 104 MG/DL (ref 70–110)
POCT GLUCOSE: 113 MG/DL (ref 70–110)
POCT GLUCOSE: 70 MG/DL (ref 70–110)
POCT GLUCOSE: 84 MG/DL (ref 70–110)
POCT GLUCOSE: 85 MG/DL (ref 70–110)
POCT GLUCOSE: 85 MG/DL (ref 70–110)
POTASSIUM SERPL-SCNC: 3.6 MMOL/L (ref 3.5–5.1)
PROT SERPL-MCNC: 5.5 G/DL (ref 6–8.4)
RBC # BLD AUTO: 3.66 M/UL (ref 4–5.4)
SODIUM SERPL-SCNC: 138 MMOL/L (ref 136–145)
WBC # BLD AUTO: 4.44 K/UL (ref 3.9–12.7)

## 2023-03-27 PROCEDURE — 85025 COMPLETE CBC W/AUTO DIFF WBC: CPT

## 2023-03-27 PROCEDURE — 25000003 PHARM REV CODE 250: Performed by: EMERGENCY MEDICINE

## 2023-03-27 PROCEDURE — 84100 ASSAY OF PHOSPHORUS: CPT

## 2023-03-27 PROCEDURE — S4991 NICOTINE PATCH NONLEGEND: HCPCS

## 2023-03-27 PROCEDURE — 12000002 HC ACUTE/MED SURGE SEMI-PRIVATE ROOM

## 2023-03-27 PROCEDURE — 25000003 PHARM REV CODE 250: Performed by: STUDENT IN AN ORGANIZED HEALTH CARE EDUCATION/TRAINING PROGRAM

## 2023-03-27 PROCEDURE — 36415 COLL VENOUS BLD VENIPUNCTURE: CPT

## 2023-03-27 PROCEDURE — 94761 N-INVAS EAR/PLS OXIMETRY MLT: CPT

## 2023-03-27 PROCEDURE — 80053 COMPREHEN METABOLIC PANEL: CPT

## 2023-03-27 PROCEDURE — 25000003 PHARM REV CODE 250

## 2023-03-27 PROCEDURE — 99233 PR SUBSEQUENT HOSPITAL CARE,LEVL III: ICD-10-PCS | Mod: ,,, | Performed by: STUDENT IN AN ORGANIZED HEALTH CARE EDUCATION/TRAINING PROGRAM

## 2023-03-27 PROCEDURE — 25000003 PHARM REV CODE 250: Performed by: INTERNAL MEDICINE

## 2023-03-27 PROCEDURE — 99233 SBSQ HOSP IP/OBS HIGH 50: CPT | Mod: ,,, | Performed by: STUDENT IN AN ORGANIZED HEALTH CARE EDUCATION/TRAINING PROGRAM

## 2023-03-27 PROCEDURE — 83735 ASSAY OF MAGNESIUM: CPT

## 2023-03-27 PROCEDURE — 63600175 PHARM REV CODE 636 W HCPCS

## 2023-03-27 RX ORDER — LOSARTAN POTASSIUM 50 MG/1
50 TABLET ORAL DAILY
Status: DISCONTINUED | OUTPATIENT
Start: 2023-03-27 | End: 2023-03-27

## 2023-03-27 RX ORDER — MAGNESIUM SULFATE HEPTAHYDRATE 40 MG/ML
2 INJECTION, SOLUTION INTRAVENOUS ONCE
Status: DISCONTINUED | OUTPATIENT
Start: 2023-03-27 | End: 2023-03-27

## 2023-03-27 RX ORDER — CHLORDIAZEPOXIDE HYDROCHLORIDE 25 MG/1
50 CAPSULE, GELATIN COATED ORAL
Status: DISCONTINUED | OUTPATIENT
Start: 2023-03-27 | End: 2023-03-29

## 2023-03-27 RX ORDER — MUPIROCIN 20 MG/G
OINTMENT TOPICAL 2 TIMES DAILY
Status: DISCONTINUED | OUTPATIENT
Start: 2023-03-28 | End: 2023-03-30 | Stop reason: HOSPADM

## 2023-03-27 RX ORDER — LOSARTAN POTASSIUM 50 MG/1
100 TABLET ORAL DAILY
Status: DISCONTINUED | OUTPATIENT
Start: 2023-03-28 | End: 2023-03-30 | Stop reason: HOSPADM

## 2023-03-27 RX ORDER — CHLORDIAZEPOXIDE HYDROCHLORIDE 25 MG/1
50 CAPSULE, GELATIN COATED ORAL ONCE
Status: COMPLETED | OUTPATIENT
Start: 2023-03-27 | End: 2023-03-27

## 2023-03-27 RX ORDER — LANOLIN ALCOHOL/MO/W.PET/CERES
400 CREAM (GRAM) TOPICAL 2 TIMES DAILY
Status: DISCONTINUED | OUTPATIENT
Start: 2023-03-27 | End: 2023-03-28

## 2023-03-27 RX ORDER — DEXTROSE MONOHYDRATE AND SODIUM CHLORIDE 5; .9 G/100ML; G/100ML
INJECTION, SOLUTION INTRAVENOUS CONTINUOUS
Status: DISCONTINUED | OUTPATIENT
Start: 2023-03-27 | End: 2023-03-27

## 2023-03-27 RX ORDER — TRAZODONE HYDROCHLORIDE 100 MG/1
300 TABLET ORAL NIGHTLY PRN
Status: DISCONTINUED | OUTPATIENT
Start: 2023-03-27 | End: 2023-03-30 | Stop reason: HOSPADM

## 2023-03-27 RX ORDER — LOPERAMIDE HYDROCHLORIDE 2 MG/1
2 CAPSULE ORAL 4 TIMES DAILY PRN
Status: DISCONTINUED | OUTPATIENT
Start: 2023-03-27 | End: 2023-03-30 | Stop reason: HOSPADM

## 2023-03-27 RX ORDER — AMLODIPINE BESYLATE 10 MG/1
10 TABLET ORAL DAILY
Status: DISCONTINUED | OUTPATIENT
Start: 2023-03-28 | End: 2023-03-30 | Stop reason: HOSPADM

## 2023-03-27 RX ORDER — SODIUM,POTASSIUM PHOSPHATES 280-250MG
1 POWDER IN PACKET (EA) ORAL
Status: COMPLETED | OUTPATIENT
Start: 2023-03-27 | End: 2023-03-27

## 2023-03-27 RX ADMIN — Medication 6 MG: at 12:03

## 2023-03-27 RX ADMIN — ONDANSETRON 4 MG: 4 TABLET, ORALLY DISINTEGRATING ORAL at 08:03

## 2023-03-27 RX ADMIN — LORAZEPAM 2 MG: 0.5 TABLET ORAL at 09:03

## 2023-03-27 RX ADMIN — Medication 6 MG: at 09:03

## 2023-03-27 RX ADMIN — THERA TABS 1 TABLET: TAB at 08:03

## 2023-03-27 RX ADMIN — LEVOTHYROXINE SODIUM 50 MCG: 50 TABLET ORAL at 06:03

## 2023-03-27 RX ADMIN — CHLORDIAZEPOXIDE HYDROCHLORIDE 50 MG: 25 CAPSULE ORAL at 04:03

## 2023-03-27 RX ADMIN — ENOXAPARIN SODIUM 40 MG: 40 INJECTION SUBCUTANEOUS at 04:03

## 2023-03-27 RX ADMIN — POTASSIUM & SODIUM PHOSPHATES POWDER PACK 280-160-250 MG 1 PACKET: 280-160-250 PACK at 09:03

## 2023-03-27 RX ADMIN — LORAZEPAM 2 MG: 0.5 TABLET ORAL at 02:03

## 2023-03-27 RX ADMIN — CHLORDIAZEPOXIDE HYDROCHLORIDE 50 MG: 25 CAPSULE ORAL at 09:03

## 2023-03-27 RX ADMIN — ONDANSETRON 4 MG: 4 TABLET, ORALLY DISINTEGRATING ORAL at 12:03

## 2023-03-27 RX ADMIN — LORAZEPAM 2 MG: 0.5 TABLET ORAL at 04:03

## 2023-03-27 RX ADMIN — DULOXETINE 30 MG: 30 CAPSULE, DELAYED RELEASE ORAL at 08:03

## 2023-03-27 RX ADMIN — TRAZODONE HYDROCHLORIDE 300 MG: 100 TABLET ORAL at 11:03

## 2023-03-27 RX ADMIN — AMLODIPINE BESYLATE 5 MG: 5 TABLET ORAL at 08:03

## 2023-03-27 RX ADMIN — LORAZEPAM 2 MG: 0.5 TABLET ORAL at 05:03

## 2023-03-27 RX ADMIN — LOPERAMIDE HYDROCHLORIDE 2 MG: 2 CAPSULE ORAL at 09:03

## 2023-03-27 RX ADMIN — CHLORDIAZEPOXIDE HYDROCHLORIDE 50 MG: 25 CAPSULE ORAL at 10:03

## 2023-03-27 RX ADMIN — THIAMINE HCL TAB 100 MG 100 MG: 100 TAB at 08:03

## 2023-03-27 RX ADMIN — ONDANSETRON 4 MG: 4 TABLET, ORALLY DISINTEGRATING ORAL at 05:03

## 2023-03-27 RX ADMIN — POTASSIUM & SODIUM PHOSPHATES POWDER PACK 280-160-250 MG 1 PACKET: 280-160-250 PACK at 10:03

## 2023-03-27 RX ADMIN — POTASSIUM & SODIUM PHOSPHATES POWDER PACK 280-160-250 MG 1 PACKET: 280-160-250 PACK at 04:03

## 2023-03-27 RX ADMIN — FOLIC ACID 1 MG: 1 TABLET ORAL at 08:03

## 2023-03-27 RX ADMIN — Medication 1 PATCH: at 08:03

## 2023-03-27 RX ADMIN — ARIPIPRAZOLE 5 MG: 5 TABLET ORAL at 08:03

## 2023-03-27 RX ADMIN — LOSARTAN POTASSIUM 50 MG: 50 TABLET, FILM COATED ORAL at 02:03

## 2023-03-27 RX ADMIN — LORAZEPAM 2 MG: 0.5 TABLET ORAL at 12:03

## 2023-03-27 RX ADMIN — Medication 400 MG: at 09:03

## 2023-03-27 NOTE — HOSPITAL COURSE
Patient admitted to  for alcohol withdrawal. Completed Librium taper with PRN ativan, discharged with final dose of taper to be taken the morning after discharge. Started on acamprosate at discharge, instructed to follow-up with PCP for further management. Patient intending to begin SMART program for alcohol cessation.

## 2023-03-27 NOTE — PROGRESS NOTES
[facility] - Intensive Care ([address] Medicine  Progress Note    Patient Name: Earl Abdul  MRN: [id number]  Patient Class: IP- Inpatient   Admission Date: 3/26/2023  Length of Stay: 1 days  Attending Physician: Luis Smith MD  Primary Care Provider: Andrew Rodriguez MD        Subjective:     Principal Problem:Alcohol withdrawal        HPI:  62F with PMH EtOH use disorder, diabetes, COPD, hypertension, hypothyroidism, breast cancer, alcohol induced cirrhosis, sarcoidosis presents from home for alcohol withdrawal. Her  at bedside states he found her on the floor of their home this morning. He was able to arouse her and brought her to the ED. She reports her last drink was this morning around 10am. For the last week, she has been drinking one fifth of vodka per day. She has been to the hospital for withdrawal many times, and she has a history of withdrawal seizures about 6 years ago, according to patient and .    In the ED, patient is restless and mildly agitated with  and /76. Labs notable for WBC 13, CMP is within normal limits. EtOH level 253, lactate 2.4. Patient had nausea and vomiting in ED, received Zofran, metoclopramide, and 1L LR. Received Ativan 2mg for withdrawal symptoms. Admitted to medicine for management of alcohol withdrawal.       Overview/Hospital Course:  Patient admitted to  for alcohol withdrawal. Undergoing Librium taper with PRN ativan.      Interval History: Patient continues to report symptoms of withdrawal including sweating and shaking. Significantly less agitation this morning.    Review of Systems   Constitutional:  Positive for diaphoresis. Negative for chills and fever.   HENT:  Negative for rhinorrhea and sore throat.    Eyes:  Negative for visual disturbance.   Respiratory:  Negative for cough and shortness of breath.    Cardiovascular:  Negative for chest pain and leg swelling.   Gastrointestinal:  Positive for vomiting.  Negative for diarrhea and nausea.   Genitourinary:  Negative for dysuria.   Musculoskeletal:  Negative for arthralgias and myalgias.   Skin:  Negative for rash.   Neurological:  Positive for tremors. Negative for light-headedness and headaches.   Psychiatric/Behavioral:  Negative for agitation and confusion.    Objective:     Vital Signs (Most Recent):  Temp: 98.3 °F (36.8 °C) (03/27/23 1133)  Pulse: 80 (03/27/23 1133)  Resp: 17 (03/27/23 1133)  BP: (!) 196/86 (03/27/23 1133)  SpO2: (!) 93 % (03/27/23 1251)   Vital Signs (24h Range):  Temp:  [98.2 °F (36.8 °C)-98.8 °F (37.1 °C)] 98.3 °F (36.8 °C)  Pulse:  [] 80  Resp:  [17-20] 17  SpO2:  [90 %-97 %] 93 %  BP: (139-196)/(67-86) 196/86     Weight: 86.2 kg (190 lb)  Body mass index is 30.67 kg/m².    Intake/Output Summary (Last 24 hours) at 3/27/2023 1315  Last data filed at 3/26/2023 2233  Gross per 24 hour   Intake 240 ml   Output --   Net 240 ml      Physical Exam  Constitutional:       Appearance: She is obese. She is diaphoretic.   HENT:      Head: Normocephalic and atraumatic.      Right Ear: External ear normal.      Left Ear: External ear normal.      Nose: Nose normal.      Mouth/Throat:      Mouth: Mucous membranes are moist.      Pharynx: Oropharynx is clear.   Eyes:      General: No scleral icterus.     Extraocular Movements: Extraocular movements intact.      Conjunctiva/sclera: Conjunctivae normal.   Cardiovascular:      Rate and Rhythm: Regular rhythm. Tachycardia present.   Pulmonary:      Effort: Pulmonary effort is normal.      Breath sounds: Normal breath sounds. No wheezing or rales.   Abdominal:      General: Abdomen is flat. Bowel sounds are normal. There is no distension.      Palpations: Abdomen is soft.      Tenderness: There is no abdominal tenderness.   Musculoskeletal:         General: No swelling. Normal range of motion.      Cervical back: Normal range of motion.   Skin:     General: Skin is warm.   Neurological:      General: No  focal deficit present.   Psychiatric:         Mood and Affect: Mood normal.         Behavior: Behavior normal.       Significant Labs: All pertinent labs within the past 24 hours have been reviewed.    Significant Imaging: I have reviewed all pertinent imaging results/findings within the past 24 hours.      Assessment/Plan:      * Alcohol withdrawal  62F with hx EtOH use disorder, DM2, HTN, depression/anxiety presents after 1-week binge of 1 fifth vodka/day. BIB  after her found her unconscious, was able to wake her and brought her to ED. Multiple presentations for withdrawal previously. EtOH level 253, lactate 2.4. Lactate normalized following fluid administration.    -seizure precautions, aspiration precautions, fall precautions  -continue Librium with PRN Ativan 2mg for CIWA>8 or withdrawal symptoms  -D5NS infusion for lactate, vomiting, mild hypoglycemia  -continue home thiamine and folate supplementation, multivitamin        Alcohol use disorder, severe, dependence  Longstanding EtOH use disorder, would benefit from community resources for alcohol cessation.    -consider acamprosate on discharge      COPD (chronic obstructive pulmonary disease)  Continue home ipratropium PRN      Type 2 diabetes mellitus with hyperglycemia  Patient's FSGs are controlled on current medication regimen.  Last A1c reviewed-   Lab Results   Component Value Date    HGBA1C 5.1 03/26/2023     Most recent fingerstick glucose reviewed-   Recent Labs   Lab 03/26/23  1829 03/27/23  0135 03/27/23  0722 03/27/23  1132   POCTGLUCOSE 70 85 85 84     Current correctional scale  Low  Maintain anti-hyperglycemic dose as follows-   Antihyperglycemics (From admission, onward)    Start     Stop Route Frequency Ordered    03/26/23 1713  insulin aspart U-100 pen 0-5 Units         -- SubQ Before meals & nightly PRN 03/26/23 1613        Hold Oral hypoglycemics while patient is in the hospital.    Essential hypertension  Continue home amlodipine        Depression with anxiety  Status of psychiatric conditions is difficult to elucidate due to alcohol use disorder.     -continue home aripiprazole, duloxetine        Hypothyroidism  Continue home levothyroxine        VTE Risk Mitigation (From admission, onward)         Ordered     enoxaparin injection 40 mg  Daily         03/26/23 1634     IP VTE HIGH RISK PATIENT  Once         03/26/23 1446     Place sequential compression device  Until discontinued         03/26/23 1446                Discharge Planning   BECCA:      Code Status: Full Code   Is the patient medically ready for discharge?:     Reason for patient still in hospital (select all that apply): Patient trending condition and Treatment                     MERISSA JACKSON MD  Department of Hospital Medicine   [facility] - Intensive Care ([place])

## 2023-03-27 NOTE — PLAN OF CARE
Problem: Adult Inpatient Plan of Care  Goal: Plan of Care Review  Outcome: Ongoing, Not Progressing  Goal: Optimal Comfort and Wellbeing  Outcome: Ongoing, Not Progressing     Problem: Alcohol Withdrawal  Goal: Alcohol Withdrawal Symptom Control  Outcome: Ongoing, Not Progressing       Somnolent. 3L 02 in progress per nc. CIWA 16; prn ativan given and effective.

## 2023-03-27 NOTE — NURSING
Pt loss IV access. Attempted to get IV but was unsuccessful. Picc team consult placed by charge nurse. MD notified.

## 2023-03-27 NOTE — SUBJECTIVE & OBJECTIVE
Interval History: Patient continues to report symptoms of withdrawal including sweating and shaking. Significantly less agitation this morning.    Review of Systems   Constitutional:  Positive for diaphoresis. Negative for chills and fever.   HENT:  Negative for rhinorrhea and sore throat.    Eyes:  Negative for visual disturbance.   Respiratory:  Negative for cough and shortness of breath.    Cardiovascular:  Negative for chest pain and leg swelling.   Gastrointestinal:  Positive for vomiting. Negative for diarrhea and nausea.   Genitourinary:  Negative for dysuria.   Musculoskeletal:  Negative for arthralgias and myalgias.   Skin:  Negative for rash.   Neurological:  Positive for tremors. Negative for light-headedness and headaches.   Psychiatric/Behavioral:  Negative for agitation and confusion.    Objective:     Vital Signs (Most Recent):  Temp: 98.3 °F (36.8 °C) (03/27/23 1133)  Pulse: 80 (03/27/23 1133)  Resp: 17 (03/27/23 1133)  BP: (!) 196/86 (03/27/23 1133)  SpO2: (!) 93 % (03/27/23 1251)   Vital Signs (24h Range):  Temp:  [98.2 °F (36.8 °C)-98.8 °F (37.1 °C)] 98.3 °F (36.8 °C)  Pulse:  [] 80  Resp:  [17-20] 17  SpO2:  [90 %-97 %] 93 %  BP: (139-196)/(67-86) 196/86     Weight: 86.2 kg (190 lb)  Body mass index is 30.67 kg/m².    Intake/Output Summary (Last 24 hours) at 3/27/2023 1315  Last data filed at 3/26/2023 2233  Gross per 24 hour   Intake 240 ml   Output --   Net 240 ml      Physical Exam  Constitutional:       Appearance: She is obese. She is diaphoretic.   HENT:      Head: Normocephalic and atraumatic.      Right Ear: External ear normal.      Left Ear: External ear normal.      Nose: Nose normal.      Mouth/Throat:      Mouth: Mucous membranes are moist.      Pharynx: Oropharynx is clear.   Eyes:      General: No scleral icterus.     Extraocular Movements: Extraocular movements intact.      Conjunctiva/sclera: Conjunctivae normal.   Cardiovascular:      Rate and Rhythm: Regular rhythm.  Tachycardia present.   Pulmonary:      Effort: Pulmonary effort is normal.      Breath sounds: Normal breath sounds. No wheezing or rales.   Abdominal:      General: Abdomen is flat. Bowel sounds are normal. There is no distension.      Palpations: Abdomen is soft.      Tenderness: There is no abdominal tenderness.   Musculoskeletal:         General: No swelling. Normal range of motion.      Cervical back: Normal range of motion.   Skin:     General: Skin is warm.   Neurological:      General: No focal deficit present.   Psychiatric:         Mood and Affect: Mood normal.         Behavior: Behavior normal.       Significant Labs: All pertinent labs within the past 24 hours have been reviewed.    Significant Imaging: I have reviewed all pertinent imaging results/findings within the past 24 hours.

## 2023-03-27 NOTE — ASSESSMENT & PLAN NOTE
Patient's FSGs are controlled on current medication regimen.  Last A1c reviewed-   Lab Results   Component Value Date    HGBA1C 5.1 03/26/2023     Most recent fingerstick glucose reviewed-   Recent Labs   Lab 03/26/23  1829 03/27/23  0135 03/27/23  0722 03/27/23  1132   POCTGLUCOSE 70 85 85 84     Current correctional scale  Low  Maintain anti-hyperglycemic dose as follows-   Antihyperglycemics (From admission, onward)    Start     Stop Route Frequency Ordered    03/26/23 1713  insulin aspart U-100 pen 0-5 Units         -- SubQ Before meals & nightly PRN 03/26/23 1613        Hold Oral hypoglycemics while patient is in the hospital.

## 2023-03-27 NOTE — PLAN OF CARE
[facility] - [facility] ([facility]-)  Initial Discharge Assessment       Primary Care Provider: Andrew Rodriguez MD    Admission Diagnosis: Alcoholism [F10.20]    Admission Date: 3/26/2023  Expected Discharge Date: 3/31/2023    Discharge Barriers Identified: (P) Substance Abuse    Payor: [facility] / Plan: [facility] ALL SAVERS / Product Type: Commercial /     Extended Emergency Contact Information  Primary Emergency Contact: Henrique Abdul  Address: [address] [place] [address] [address] [facility] [place] [address] [facility]  Home Phone: [phone]  Relation: Spouse  Secondary Emergency Contact: Carlos Suazo  Mobile Phone: [phone]  Relation: Son    Discharge Plan A: (P) Home  Discharge Plan B: (P) Home with family      [facility] [place] [address] - [place] [address] [place] [address]  [address] [place] [address]  [place] [address]  Phone: [phone] Fax: [phone]    [id number] [place] - [address] [place] AT [facility] [address] [facility]  [address]  [place] [address]  Phone: [phone] Fax: [phone]      Initial Assessment (most recent)       Adult Discharge Assessment - 03/27/23 1334          Discharge Assessment    Assessment Type Discharge Planning Assessment (P)      Confirmed/corrected address, phone number and insurance Yes (P)      Confirmed Demographics Correct on Facesheet (P)      Source of Information patient (P)      Reason For Admission etoh (P)      People in Home spouse (P)      Do you expect to return to your current living situation? Yes (P)      Do you have help at home or someone to help you manage your care at home? Yes (P)      Who are your caregiver(s) and their phone number(s)? spouse (P)      Prior to hospitilization cognitive status: Unable to Assess (P)      Current cognitive status: -- (P)    answers questions but falls asleep during interview    Equipment Currently Used at Home none (P)      Readmission within 30 days? No  (P)      Patient currently being followed by outpatient case management? No (P)      Do you currently have service(s) that help you manage your care at home? No (P)      Do you take prescription medications? Yes (P)      Do you have prescription coverage? Yes (P)      Coverage [facility] (P)      Do you have any problems affording any of your prescribed medications? No (P)      Is the patient taking medications as prescribed? yes (P)      Who is going to help you get home at discharge? spouse (P)      How do you get to doctors appointments? family or friend will provide (P)      Are you on dialysis? No (P)      Do you take coumadin? No (P)      Discharge Plan A Home (P)      Discharge Plan B Home with family (P)      DME Needed Upon Discharge  none (P)      Discharge Plan discussed with: Patient (P)      Discharge Barriers Identified Substance Abuse (P)                             Spoke with pt she falls asleep quickly during interview but wakes up when name is called, [facility] [place] [facility] states she got out about a month ago does not want to go anywhere but home this time with IOP

## 2023-03-27 NOTE — PLAN OF CARE
Problem: Adult Inpatient Plan of Care  Goal: Plan of Care Review  Outcome: Ongoing, Progressing     Problem: Diabetes Comorbidity  Goal: Blood Glucose Level Within Targeted Range  Outcome: Ongoing, Progressing     Problem: Skin Injury Risk Increased  Goal: Skin Health and Integrity  Outcome: Ongoing, Progressing     Problem: Alcohol Withdrawal  Goal: Alcohol Withdrawal Symptom Control  Outcome: Ongoing, Progressing     Problem: Fall Injury Risk  Goal: Absence of Fall and Fall-Related Injury  Outcome: Ongoing, Progressing

## 2023-03-27 NOTE — ASSESSMENT & PLAN NOTE
62F with hx EtOH use disorder, DM2, HTN, depression/anxiety presents after 1-week binge of 1 fifth vodka/day. BIB  after her found her unconscious, was able to wake her and brought her to ED. Multiple presentations for withdrawal previously. EtOH level 253, lactate 2.4. Lactate normalized following fluid administration.    -seizure precautions, aspiration precautions, fall precautions  -continue Librium with PRN Ativan 2mg for CIWA>8 or withdrawal symptoms  -D5NS infusion for lactate, vomiting, mild hypoglycemia  -continue home thiamine and folate supplementation, multivitamin

## 2023-03-28 LAB
ALBUMIN SERPL BCP-MCNC: 3.2 G/DL (ref 3.5–5.2)
ALP SERPL-CCNC: 42 U/L (ref 55–135)
ALT SERPL W/O P-5'-P-CCNC: 16 U/L (ref 10–44)
ANION GAP SERPL CALC-SCNC: 12 MMOL/L (ref 8–16)
ANISOCYTOSIS BLD QL SMEAR: SLIGHT
AST SERPL-CCNC: 27 U/L (ref 10–40)
BASOPHILS # BLD AUTO: 0.02 K/UL (ref 0–0.2)
BASOPHILS NFR BLD: 0.6 % (ref 0–1.9)
BILIRUB SERPL-MCNC: 0.5 MG/DL (ref 0.1–1)
BUN SERPL-MCNC: 7 MG/DL (ref 8–23)
CALCIUM SERPL-MCNC: 8.2 MG/DL (ref 8.7–10.5)
CHLORIDE SERPL-SCNC: 102 MMOL/L (ref 95–110)
CO2 SERPL-SCNC: 24 MMOL/L (ref 23–29)
CREAT SERPL-MCNC: 0.7 MG/DL (ref 0.5–1.4)
DIFFERENTIAL METHOD: ABNORMAL
EOSINOPHIL # BLD AUTO: 0 K/UL (ref 0–0.5)
EOSINOPHIL NFR BLD: 0.6 % (ref 0–8)
ERYTHROCYTE [DISTWIDTH] IN BLOOD BY AUTOMATED COUNT: 18.1 % (ref 11.5–14.5)
EST. GFR  (NO RACE VARIABLE): >60 ML/MIN/1.73 M^2
GLUCOSE SERPL-MCNC: 85 MG/DL (ref 70–110)
HCT VFR BLD AUTO: 32.7 % (ref 37–48.5)
HGB BLD-MCNC: 10 G/DL (ref 12–16)
IMM GRANULOCYTES # BLD AUTO: 0.03 K/UL (ref 0–0.04)
IMM GRANULOCYTES NFR BLD AUTO: 1 % (ref 0–0.5)
LYMPHOCYTES # BLD AUTO: 1.9 K/UL (ref 1–4.8)
LYMPHOCYTES NFR BLD: 59.9 % (ref 18–48)
MAGNESIUM SERPL-MCNC: 1.5 MG/DL (ref 1.6–2.6)
MCH RBC QN AUTO: 27.7 PG (ref 27–31)
MCHC RBC AUTO-ENTMCNC: 30.6 G/DL (ref 32–36)
MCV RBC AUTO: 91 FL (ref 82–98)
MONOCYTES # BLD AUTO: 0.3 K/UL (ref 0.3–1)
MONOCYTES NFR BLD: 10.8 % (ref 4–15)
NEUTROPHILS # BLD AUTO: 0.9 K/UL (ref 1.8–7.7)
NEUTROPHILS NFR BLD: 27.1 % (ref 38–73)
NRBC BLD-RTO: 0 /100 WBC
PHOSPHATE SERPL-MCNC: 3 MG/DL (ref 2.7–4.5)
PLATELET # BLD AUTO: 70 K/UL (ref 150–450)
PLATELET BLD QL SMEAR: ABNORMAL
PMV BLD AUTO: 10.6 FL (ref 9.2–12.9)
POCT GLUCOSE: 117 MG/DL (ref 70–110)
POCT GLUCOSE: 118 MG/DL (ref 70–110)
POCT GLUCOSE: 144 MG/DL (ref 70–110)
POCT GLUCOSE: 185 MG/DL (ref 70–110)
POTASSIUM SERPL-SCNC: 3.4 MMOL/L (ref 3.5–5.1)
PROT SERPL-MCNC: 5.3 G/DL (ref 6–8.4)
RBC # BLD AUTO: 3.61 M/UL (ref 4–5.4)
SMUDGE CELLS BLD QL SMEAR: PRESENT
SODIUM SERPL-SCNC: 138 MMOL/L (ref 136–145)
WBC # BLD AUTO: 3.14 K/UL (ref 3.9–12.7)

## 2023-03-28 PROCEDURE — 25000003 PHARM REV CODE 250: Performed by: STUDENT IN AN ORGANIZED HEALTH CARE EDUCATION/TRAINING PROGRAM

## 2023-03-28 PROCEDURE — S4991 NICOTINE PATCH NONLEGEND: HCPCS

## 2023-03-28 PROCEDURE — 99233 PR SUBSEQUENT HOSPITAL CARE,LEVL III: ICD-10-PCS | Mod: ,,, | Performed by: STUDENT IN AN ORGANIZED HEALTH CARE EDUCATION/TRAINING PROGRAM

## 2023-03-28 PROCEDURE — 84100 ASSAY OF PHOSPHORUS: CPT

## 2023-03-28 PROCEDURE — 25000003 PHARM REV CODE 250: Performed by: INTERNAL MEDICINE

## 2023-03-28 PROCEDURE — 80053 COMPREHEN METABOLIC PANEL: CPT

## 2023-03-28 PROCEDURE — 85025 COMPLETE CBC W/AUTO DIFF WBC: CPT

## 2023-03-28 PROCEDURE — 99233 SBSQ HOSP IP/OBS HIGH 50: CPT | Mod: ,,, | Performed by: STUDENT IN AN ORGANIZED HEALTH CARE EDUCATION/TRAINING PROGRAM

## 2023-03-28 PROCEDURE — 25000003 PHARM REV CODE 250

## 2023-03-28 PROCEDURE — 36415 COLL VENOUS BLD VENIPUNCTURE: CPT

## 2023-03-28 PROCEDURE — 83735 ASSAY OF MAGNESIUM: CPT

## 2023-03-28 PROCEDURE — 12000002 HC ACUTE/MED SURGE SEMI-PRIVATE ROOM

## 2023-03-28 PROCEDURE — 63600175 PHARM REV CODE 636 W HCPCS

## 2023-03-28 RX ORDER — IBUPROFEN 600 MG/1
600 TABLET ORAL EVERY 6 HOURS PRN
Status: DISCONTINUED | OUTPATIENT
Start: 2023-03-28 | End: 2023-03-30 | Stop reason: HOSPADM

## 2023-03-28 RX ORDER — SODIUM CHLORIDE 9 MG/ML
INJECTION, SOLUTION INTRAVENOUS CONTINUOUS
Status: ACTIVE | OUTPATIENT
Start: 2023-03-28 | End: 2023-03-29

## 2023-03-28 RX ORDER — POTASSIUM CHLORIDE 20 MEQ/1
20 TABLET, EXTENDED RELEASE ORAL ONCE
Status: COMPLETED | OUTPATIENT
Start: 2023-03-28 | End: 2023-03-28

## 2023-03-28 RX ORDER — ACETAMINOPHEN 325 MG/1
650 TABLET ORAL EVERY 6 HOURS PRN
Status: DISCONTINUED | OUTPATIENT
Start: 2023-03-28 | End: 2023-03-30 | Stop reason: HOSPADM

## 2023-03-28 RX ORDER — ISOSORBIDE MONONITRATE 30 MG/1
30 TABLET, EXTENDED RELEASE ORAL NIGHTLY
Status: DISCONTINUED | OUTPATIENT
Start: 2023-03-28 | End: 2023-03-30 | Stop reason: HOSPADM

## 2023-03-28 RX ORDER — LANOLIN ALCOHOL/MO/W.PET/CERES
800 CREAM (GRAM) TOPICAL 2 TIMES DAILY
Status: COMPLETED | OUTPATIENT
Start: 2023-03-28 | End: 2023-03-28

## 2023-03-28 RX ADMIN — Medication 1 PATCH: at 09:03

## 2023-03-28 RX ADMIN — ENOXAPARIN SODIUM 40 MG: 40 INJECTION SUBCUTANEOUS at 05:03

## 2023-03-28 RX ADMIN — Medication 800 MG: at 09:03

## 2023-03-28 RX ADMIN — TRAZODONE HYDROCHLORIDE 300 MG: 100 TABLET ORAL at 09:03

## 2023-03-28 RX ADMIN — AMLODIPINE BESYLATE 10 MG: 10 TABLET ORAL at 09:03

## 2023-03-28 RX ADMIN — CHLORDIAZEPOXIDE HYDROCHLORIDE 50 MG: 25 CAPSULE ORAL at 05:03

## 2023-03-28 RX ADMIN — SODIUM CHLORIDE: 9 INJECTION, SOLUTION INTRAVENOUS at 05:03

## 2023-03-28 RX ADMIN — CHLORDIAZEPOXIDE HYDROCHLORIDE 50 MG: 25 CAPSULE ORAL at 09:03

## 2023-03-28 RX ADMIN — ISOSORBIDE MONONITRATE 30 MG: 30 TABLET, EXTENDED RELEASE ORAL at 09:03

## 2023-03-28 RX ADMIN — POTASSIUM CHLORIDE 20 MEQ: 1500 TABLET, EXTENDED RELEASE ORAL at 09:03

## 2023-03-28 RX ADMIN — ACETAMINOPHEN 650 MG: 325 TABLET ORAL at 10:03

## 2023-03-28 RX ADMIN — THIAMINE HCL TAB 100 MG 100 MG: 100 TAB at 09:03

## 2023-03-28 RX ADMIN — MUPIROCIN: 20 OINTMENT TOPICAL at 12:03

## 2023-03-28 RX ADMIN — ACETAMINOPHEN 650 MG: 325 TABLET ORAL at 12:03

## 2023-03-28 RX ADMIN — IBUPROFEN 600 MG: 600 TABLET, FILM COATED ORAL at 03:03

## 2023-03-28 RX ADMIN — MUPIROCIN: 20 OINTMENT TOPICAL at 09:03

## 2023-03-28 RX ADMIN — LORAZEPAM 2 MG: 0.5 TABLET ORAL at 05:03

## 2023-03-28 RX ADMIN — ARIPIPRAZOLE 5 MG: 5 TABLET ORAL at 09:03

## 2023-03-28 RX ADMIN — LORAZEPAM 2 MG: 0.5 TABLET ORAL at 10:03

## 2023-03-28 RX ADMIN — CHLORDIAZEPOXIDE HYDROCHLORIDE 50 MG: 25 CAPSULE ORAL at 10:03

## 2023-03-28 RX ADMIN — CHLORDIAZEPOXIDE HYDROCHLORIDE 50 MG: 25 CAPSULE ORAL at 03:03

## 2023-03-28 RX ADMIN — DULOXETINE 30 MG: 30 CAPSULE, DELAYED RELEASE ORAL at 09:03

## 2023-03-28 RX ADMIN — LORAZEPAM 2 MG: 0.5 TABLET ORAL at 12:03

## 2023-03-28 RX ADMIN — FOLIC ACID 1 MG: 1 TABLET ORAL at 09:03

## 2023-03-28 RX ADMIN — LOSARTAN POTASSIUM 100 MG: 50 TABLET, FILM COATED ORAL at 09:03

## 2023-03-28 RX ADMIN — IBUPROFEN 600 MG: 600 TABLET, FILM COATED ORAL at 09:03

## 2023-03-28 RX ADMIN — THERA TABS 1 TABLET: TAB at 09:03

## 2023-03-28 RX ADMIN — ACETAMINOPHEN 650 MG: 325 TABLET ORAL at 05:03

## 2023-03-28 RX ADMIN — LEVOTHYROXINE SODIUM 50 MCG: 50 TABLET ORAL at 05:03

## 2023-03-28 NOTE — PROGRESS NOTES
[facility] ([address] Medicine  Progress Note    Patient Name: Earl Abdul  MRN: [id number]  Patient Class: IP- Inpatient   Admission Date: 3/26/2023  Length of Stay: 2 days  Attending Physician: Luis Smith MD  Primary Care Provider: Andrew Rodriguez MD        Subjective:     Principal Problem:Alcohol withdrawal        HPI:  62F with PMH EtOH use disorder, diabetes, COPD, hypertension, hypothyroidism, breast cancer, alcohol induced cirrhosis, sarcoidosis presents from home for alcohol withdrawal. Her  at bedside states he found her on the floor of their home this morning. He was able to arouse her and brought her to the ED. She reports her last drink was this morning around 10am. For the last week, she has been drinking one fifth of vodka per day. She has been to the hospital for withdrawal many times, and she has a history of withdrawal seizures about 6 years ago, according to patient and .    In the ED, patient is restless and mildly agitated with  and /76. Labs notable for WBC 13, CMP is within normal limits. EtOH level 253, lactate 2.4. Patient had nausea and vomiting in ED, received Zofran, metoclopramide, and 1L LR. Received Ativan 2mg for withdrawal symptoms. Admitted to medicine for management of alcohol withdrawal.       Overview/Hospital Course:  Patient admitted to  for alcohol withdrawal. Undergoing Librium taper with PRN ativan.      Interval History: Improved overnight but persistently tachycardic. Required ativan prn every 4 hours 1 day ago but requiring less now. Will maintain current librium dose.    Review of Systems   Constitutional:  Positive for diaphoresis. Negative for chills and fever.   HENT:  Negative for rhinorrhea and sore throat.    Eyes:  Negative for visual disturbance.   Respiratory:  Negative for cough and shortness of breath.    Cardiovascular:  Negative for chest pain and leg swelling.   Gastrointestinal:   Positive for vomiting. Negative for diarrhea and nausea.   Genitourinary:  Negative for dysuria.   Musculoskeletal:  Negative for arthralgias and myalgias.   Skin:  Negative for rash.   Neurological:  Positive for tremors. Negative for light-headedness and headaches.   Psychiatric/Behavioral:  Negative for agitation and confusion.    Objective:     Vital Signs (Most Recent):  Temp: 98.3 °F (36.8 °C) (03/28/23 1214)  Pulse: 83 (03/28/23 1214)  Resp: 18 (03/28/23 1214)  BP: (!) 171/72 (03/28/23 1214)  SpO2: (!) 90 % (03/28/23 1214)   Vital Signs (24h Range):  Temp:  [97.9 °F (36.6 °C)-98.7 °F (37.1 °C)] 98.3 °F (36.8 °C)  Pulse:  [72-91] 83  Resp:  [18-20] 18  SpO2:  [90 %-95 %] 90 %  BP: (139-180)/(63-81) 171/72     Weight: 86 kg (189 lb 9.5 oz)  Body mass index is 30.6 kg/m².  No intake or output data in the 24 hours ending 03/28/23 1300     Physical Exam  Constitutional:       Appearance: She is obese. She is diaphoretic.   HENT:      Head: Normocephalic and atraumatic.      Right Ear: External ear normal.      Left Ear: External ear normal.      Nose: Nose normal.      Mouth/Throat:      Mouth: Mucous membranes are moist.      Pharynx: Oropharynx is clear.   Eyes:      General: No scleral icterus.     Extraocular Movements: Extraocular movements intact.      Conjunctiva/sclera: Conjunctivae normal.   Cardiovascular:      Rate and Rhythm: Regular rhythm. Tachycardia present.   Pulmonary:      Effort: Pulmonary effort is normal.      Breath sounds: Normal breath sounds. No wheezing or rales.   Abdominal:      General: Abdomen is flat. Bowel sounds are normal. There is no distension.      Palpations: Abdomen is soft.      Tenderness: There is no abdominal tenderness.   Musculoskeletal:         General: No swelling. Normal range of motion.      Cervical back: Normal range of motion.   Skin:     General: Skin is warm.   Neurological:      General: No focal deficit present.   Psychiatric:         Mood and Affect: Mood  normal.         Behavior: Behavior normal.       Significant Labs: All pertinent labs within the past 24 hours have been reviewed.    Significant Imaging: I have reviewed all pertinent imaging results/findings within the past 24 hours.      Assessment/Plan:      * Alcohol withdrawal  62F with hx EtOH use disorder, DM2, HTN, depression/anxiety presents after 1-week binge of 1 fifth vodka/day. BIB  after her found her unconscious, was able to wake her and brought her to ED. Multiple presentations for withdrawal previously. EtOH level 253, lactate 2.4. Lactate normalized following fluid administration.    -seizure precautions, aspiration precautions, fall precautions  -continue Librium taper with PRN Ativan 2mg for CIWA>8 or withdrawal symptoms  -continue home thiamine and folate supplementation, multivitamin        Type 2 diabetes mellitus with hyperglycemia  Patient's FSGs are controlled on current medication regimen.  Last A1c reviewed-   Lab Results   Component Value Date    HGBA1C 5.1 03/26/2023     Most recent fingerstick glucose reviewed-   Recent Labs   Lab 03/27/23  1602 03/27/23  2133 03/28/23  0800 03/28/23  1153   POCTGLUCOSE 113* 104 118* 117*     Current correctional scale  Low  Maintain anti-hyperglycemic dose as follows-   Antihyperglycemics (From admission, onward)    Start     Stop Route Frequency Ordered    03/26/23 1713  insulin aspart U-100 pen 0-5 Units         -- SubQ Before meals & nightly PRN 03/26/23 1613        Hold Oral hypoglycemics while patient is in the hospital.    Hypothyroidism  Continue home levothyroxine      COPD (chronic obstructive pulmonary disease)  Continue home ipratropium PRN      Depression with anxiety  Status of psychiatric conditions is difficult to elucidate due to alcohol use disorder.     -continue home aripiprazole, duloxetine        Essential hypertension  Continue home amlodipine       Alcohol use disorder, severe, dependence  Longstanding EtOH use disorder,  would benefit from community resources for alcohol cessation.    -consider acamprosate on discharge        VTE Risk Mitigation (From admission, onward)         Ordered     enoxaparin injection 40 mg  Daily         03/26/23 1634     IP VTE HIGH RISK PATIENT  Once         03/26/23 1446     Place sequential compression device  Until discontinued         03/26/23 1446                Discharge Planning   BECCA: 3/31/2023     Code Status: Full Code   Is the patient medically ready for discharge?:     Reason for patient still in hospital (select all that apply): Patient trending condition  Discharge Plan A: Home          Rupert Ovalle MD  Department of Hospital Medicine   [facility] - Intensive Care ([place])

## 2023-03-28 NOTE — NURSING
"Patient verbalized for "HELP"  We just finished Huddle at the .  Patient stated , " I stood up to go to the bathroom, felt dizzy, went to get back to bed and hit my head on footboard.  No fall.  (No noise heard at the .  We were just doing huddle. ) Small amount of blood on bedspread.  Patient sat down on bed and called for help.  Ice pack applied to head.  VS taken.  On call notified.  Ibuprofen and Tylenol ordered.  Ibuprofen given.  Patient able to verbalize name, , and that she is in the hospital.  No other orders given at this time.    "

## 2023-03-28 NOTE — SUBJECTIVE & OBJECTIVE
Interval History: Improved overnight but persistently tachycardic. Required ativan prn every 4 hours 1 day ago but requiring less now. Will maintain current librium dose.    Review of Systems   Constitutional:  Positive for diaphoresis. Negative for chills and fever.   HENT:  Negative for rhinorrhea and sore throat.    Eyes:  Negative for visual disturbance.   Respiratory:  Negative for cough and shortness of breath.    Cardiovascular:  Negative for chest pain and leg swelling.   Gastrointestinal:  Positive for vomiting. Negative for diarrhea and nausea.   Genitourinary:  Negative for dysuria.   Musculoskeletal:  Negative for arthralgias and myalgias.   Skin:  Negative for rash.   Neurological:  Positive for tremors. Negative for light-headedness and headaches.   Psychiatric/Behavioral:  Negative for agitation and confusion.    Objective:     Vital Signs (Most Recent):  Temp: 98.3 °F (36.8 °C) (03/28/23 1214)  Pulse: 83 (03/28/23 1214)  Resp: 18 (03/28/23 1214)  BP: (!) 171/72 (03/28/23 1214)  SpO2: (!) 90 % (03/28/23 1214)   Vital Signs (24h Range):  Temp:  [97.9 °F (36.6 °C)-98.7 °F (37.1 °C)] 98.3 °F (36.8 °C)  Pulse:  [72-91] 83  Resp:  [18-20] 18  SpO2:  [90 %-95 %] 90 %  BP: (139-180)/(63-81) 171/72     Weight: 86 kg (189 lb 9.5 oz)  Body mass index is 30.6 kg/m².  No intake or output data in the 24 hours ending 03/28/23 1300     Physical Exam  Constitutional:       Appearance: She is obese. She is diaphoretic.   HENT:      Head: Normocephalic and atraumatic.      Right Ear: External ear normal.      Left Ear: External ear normal.      Nose: Nose normal.      Mouth/Throat:      Mouth: Mucous membranes are moist.      Pharynx: Oropharynx is clear.   Eyes:      General: No scleral icterus.     Extraocular Movements: Extraocular movements intact.      Conjunctiva/sclera: Conjunctivae normal.   Cardiovascular:      Rate and Rhythm: Regular rhythm. Tachycardia present.   Pulmonary:      Effort: Pulmonary effort is  normal.      Breath sounds: Normal breath sounds. No wheezing or rales.   Abdominal:      General: Abdomen is flat. Bowel sounds are normal. There is no distension.      Palpations: Abdomen is soft.      Tenderness: There is no abdominal tenderness.   Musculoskeletal:         General: No swelling. Normal range of motion.      Cervical back: Normal range of motion.   Skin:     General: Skin is warm.   Neurological:      General: No focal deficit present.   Psychiatric:         Mood and Affect: Mood normal.         Behavior: Behavior normal.       Significant Labs: All pertinent labs within the past 24 hours have been reviewed.    Significant Imaging: I have reviewed all pertinent imaging results/findings within the past 24 hours.

## 2023-03-28 NOTE — ASSESSMENT & PLAN NOTE
Patient's FSGs are controlled on current medication regimen.  Last A1c reviewed-   Lab Results   Component Value Date    HGBA1C 5.1 03/26/2023     Most recent fingerstick glucose reviewed-   Recent Labs   Lab 03/27/23  1602 03/27/23  2133 03/28/23  0800 03/28/23  1153   POCTGLUCOSE 113* 104 118* 117*     Current correctional scale  Low  Maintain anti-hyperglycemic dose as follows-   Antihyperglycemics (From admission, onward)    Start     Stop Route Frequency Ordered    03/26/23 1713  insulin aspart U-100 pen 0-5 Units         -- SubQ Before meals & nightly PRN 03/26/23 1613        Hold Oral hypoglycemics while patient is in the hospital.

## 2023-03-28 NOTE — ASSESSMENT & PLAN NOTE
62F with hx EtOH use disorder, DM2, HTN, depression/anxiety presents after 1-week binge of 1 fifth vodka/day. BIB  after her found her unconscious, was able to wake her and brought her to ED. Multiple presentations for withdrawal previously. EtOH level 253, lactate 2.4. Lactate normalized following fluid administration.    -seizure precautions, aspiration precautions, fall precautions  -continue Librium taper with PRN Ativan 2mg for CIWA>8 or withdrawal symptoms  -continue home thiamine and folate supplementation, multivitamin

## 2023-03-28 NOTE — CARE UPDATE
"RAPID RESPONSE NURSE CHART REVIEW       Chart Reviewed: 03/28/2023, 1:43 AM    MRN: [id number]  Bed: 29550/00751 A    Dx: Alcohol withdrawal    Earl Abdul has a past medical history of Anemia, Anemia, Controlled type 2 diabetes mellitus without complication, without long-term current use of insulin, COPD (chronic obstructive pulmonary disease), Depression, Diverticulitis, Fatty liver, GERD (gastroesophageal reflux disease), Hyperlipidemia, Hypertension, Pancreatitis, Peptic ulcer disease, Polysubstance abuse, Posterior reversible encephalopathy syndrome, Sarcoidosis of lung, Sarcoidosis of lung, Seizures, Suicide attempt, and Suicide ideation.    Last VS: BP (!) 175/72 (Patient Position: Lying)   Pulse 91   Temp 97.9 °F (36.6 °C)   Resp (!) 32   Ht 5' 6" (1.676 m)   Wt 86.2 kg (190 lb)   SpO2 95%   BMI 30.67 kg/m²     24H Vital Sign Range:  Temp:  [97.9 °F (36.6 °C)-98.7 °F (37.1 °C)]   Pulse:  []   Resp:  [17-32]   BP: (139-196)/(67-86)   SpO2:  [90 %-96 %]     Level of Consciousness (AVPU): responds to voice    Recent Labs     03/26/23  1128 03/27/23  0705   WBC 13.01* 4.44   HGB 12.2 10.0*   HCT 41.9 34.0*   * 72*       Recent Labs     03/26/23  1312 03/27/23  0705    138   K 4.3 3.6    104   CO2 22* 27   CREATININE 0.7 0.7   GLU 95 96   PHOS  --  2.0*   MG 1.5* 1.5*        Recent Labs     03/26/23  1208   PH 7.313*   PCO2 53.0*   PO2 36*   HCO3 26.9   POCSATURATED 63*   BE 1        OXYGEN:  Flow (L/min): 2          MEWS score: 2    Chart review completed. No concerns at this time. Instructed to call [phone] for further concerns or assistance.    Liborio Pierre RN          "

## 2023-03-29 ENCOUNTER — RESEARCH ENCOUNTER (OUTPATIENT)
Dept: RESEARCH | Facility: HOSPITAL | Age: 63
End: 2023-03-29
Payer: COMMERCIAL

## 2023-03-29 LAB
ALBUMIN SERPL BCP-MCNC: 3.1 G/DL (ref 3.5–5.2)
ALP SERPL-CCNC: 44 U/L (ref 55–135)
ALT SERPL W/O P-5'-P-CCNC: 15 U/L (ref 10–44)
ANION GAP SERPL CALC-SCNC: 8 MMOL/L (ref 8–16)
AST SERPL-CCNC: 24 U/L (ref 10–40)
BASOPHILS # BLD AUTO: 0.01 K/UL (ref 0–0.2)
BASOPHILS NFR BLD: 0.3 % (ref 0–1.9)
BILIRUB SERPL-MCNC: 0.3 MG/DL (ref 0.1–1)
BUN SERPL-MCNC: 7 MG/DL (ref 8–23)
CALCIUM SERPL-MCNC: 8.8 MG/DL (ref 8.7–10.5)
CHLORIDE SERPL-SCNC: 105 MMOL/L (ref 95–110)
CO2 SERPL-SCNC: 27 MMOL/L (ref 23–29)
CREAT SERPL-MCNC: 1.1 MG/DL (ref 0.5–1.4)
DIFFERENTIAL METHOD: ABNORMAL
EOSINOPHIL # BLD AUTO: 0 K/UL (ref 0–0.5)
EOSINOPHIL NFR BLD: 1.3 % (ref 0–8)
ERYTHROCYTE [DISTWIDTH] IN BLOOD BY AUTOMATED COUNT: 18.6 % (ref 11.5–14.5)
EST. GFR  (NO RACE VARIABLE): 56.1 ML/MIN/1.73 M^2
GLUCOSE SERPL-MCNC: 147 MG/DL (ref 70–110)
HCT VFR BLD AUTO: 32.2 % (ref 37–48.5)
HGB BLD-MCNC: 10 G/DL (ref 12–16)
IMM GRANULOCYTES # BLD AUTO: 0.02 K/UL (ref 0–0.04)
IMM GRANULOCYTES NFR BLD AUTO: 0.6 % (ref 0–0.5)
LYMPHOCYTES # BLD AUTO: 1.7 K/UL (ref 1–4.8)
LYMPHOCYTES NFR BLD: 54.9 % (ref 18–48)
MAGNESIUM SERPL-MCNC: 1.9 MG/DL (ref 1.6–2.6)
MCH RBC QN AUTO: 27.9 PG (ref 27–31)
MCHC RBC AUTO-ENTMCNC: 31.1 G/DL (ref 32–36)
MCV RBC AUTO: 90 FL (ref 82–98)
MONOCYTES # BLD AUTO: 0.4 K/UL (ref 0.3–1)
MONOCYTES NFR BLD: 12.7 % (ref 4–15)
NEUTROPHILS # BLD AUTO: 0.9 K/UL (ref 1.8–7.7)
NEUTROPHILS NFR BLD: 30.2 % (ref 38–73)
NRBC BLD-RTO: 0 /100 WBC
PHOSPHATE SERPL-MCNC: 3.9 MG/DL (ref 2.7–4.5)
PLATELET # BLD AUTO: 80 K/UL (ref 150–450)
PMV BLD AUTO: 10.7 FL (ref 9.2–12.9)
POCT GLUCOSE: 119 MG/DL (ref 70–110)
POCT GLUCOSE: 137 MG/DL (ref 70–110)
POCT GLUCOSE: 158 MG/DL (ref 70–110)
POCT GLUCOSE: 170 MG/DL (ref 70–110)
POTASSIUM SERPL-SCNC: 3.6 MMOL/L (ref 3.5–5.1)
PROT SERPL-MCNC: 5.2 G/DL (ref 6–8.4)
RBC # BLD AUTO: 3.58 M/UL (ref 4–5.4)
SODIUM SERPL-SCNC: 140 MMOL/L (ref 136–145)
WBC # BLD AUTO: 3.08 K/UL (ref 3.9–12.7)

## 2023-03-29 PROCEDURE — 99233 PR SUBSEQUENT HOSPITAL CARE,LEVL III: ICD-10-PCS | Mod: ,,, | Performed by: STUDENT IN AN ORGANIZED HEALTH CARE EDUCATION/TRAINING PROGRAM

## 2023-03-29 PROCEDURE — 99233 SBSQ HOSP IP/OBS HIGH 50: CPT | Mod: ,,, | Performed by: STUDENT IN AN ORGANIZED HEALTH CARE EDUCATION/TRAINING PROGRAM

## 2023-03-29 PROCEDURE — 25000003 PHARM REV CODE 250: Performed by: STUDENT IN AN ORGANIZED HEALTH CARE EDUCATION/TRAINING PROGRAM

## 2023-03-29 PROCEDURE — 63600175 PHARM REV CODE 636 W HCPCS

## 2023-03-29 PROCEDURE — 85025 COMPLETE CBC W/AUTO DIFF WBC: CPT

## 2023-03-29 PROCEDURE — 25000003 PHARM REV CODE 250

## 2023-03-29 PROCEDURE — S4991 NICOTINE PATCH NONLEGEND: HCPCS

## 2023-03-29 PROCEDURE — 36415 COLL VENOUS BLD VENIPUNCTURE: CPT

## 2023-03-29 PROCEDURE — 25000003 PHARM REV CODE 250: Performed by: INTERNAL MEDICINE

## 2023-03-29 PROCEDURE — 12000002 HC ACUTE/MED SURGE SEMI-PRIVATE ROOM

## 2023-03-29 PROCEDURE — 84100 ASSAY OF PHOSPHORUS: CPT

## 2023-03-29 PROCEDURE — 80053 COMPREHEN METABOLIC PANEL: CPT

## 2023-03-29 PROCEDURE — 83735 ASSAY OF MAGNESIUM: CPT

## 2023-03-29 RX ORDER — DICYCLOMINE HYDROCHLORIDE 20 MG/1
20 TABLET ORAL 4 TIMES DAILY
COMMUNITY
Start: 2023-02-22 | End: 2023-05-15

## 2023-03-29 RX ORDER — METHOCARBAMOL 750 MG/1
750 TABLET, FILM COATED ORAL 3 TIMES DAILY PRN
COMMUNITY
Start: 2023-02-22 | End: 2023-05-15

## 2023-03-29 RX ORDER — PROPRANOLOL HYDROCHLORIDE 20 MG/1
20 TABLET ORAL 2 TIMES DAILY
COMMUNITY
End: 2023-04-06 | Stop reason: SDUPTHER

## 2023-03-29 RX ORDER — DIVALPROEX SODIUM 125 MG/1
250 CAPSULE, COATED PELLETS ORAL 2 TIMES DAILY
COMMUNITY
Start: 2023-03-14 | End: 2023-04-06

## 2023-03-29 RX ORDER — CHLORDIAZEPOXIDE HYDROCHLORIDE 25 MG/1
50 CAPSULE, GELATIN COATED ORAL EVERY 8 HOURS
Status: DISCONTINUED | OUTPATIENT
Start: 2023-03-29 | End: 2023-03-30

## 2023-03-29 RX ADMIN — ONDANSETRON 4 MG: 4 TABLET, ORALLY DISINTEGRATING ORAL at 10:03

## 2023-03-29 RX ADMIN — DULOXETINE 30 MG: 30 CAPSULE, DELAYED RELEASE ORAL at 09:03

## 2023-03-29 RX ADMIN — FOLIC ACID 1 MG: 1 TABLET ORAL at 09:03

## 2023-03-29 RX ADMIN — MUPIROCIN: 20 OINTMENT TOPICAL at 09:03

## 2023-03-29 RX ADMIN — LORAZEPAM 2 MG: 0.5 TABLET ORAL at 04:03

## 2023-03-29 RX ADMIN — CHLORDIAZEPOXIDE HYDROCHLORIDE 50 MG: 25 CAPSULE ORAL at 09:03

## 2023-03-29 RX ADMIN — Medication 1 PATCH: at 09:03

## 2023-03-29 RX ADMIN — ACETAMINOPHEN 650 MG: 325 TABLET ORAL at 04:03

## 2023-03-29 RX ADMIN — LEVOTHYROXINE SODIUM 50 MCG: 50 TABLET ORAL at 05:03

## 2023-03-29 RX ADMIN — ENOXAPARIN SODIUM 40 MG: 40 INJECTION SUBCUTANEOUS at 05:03

## 2023-03-29 RX ADMIN — ISOSORBIDE MONONITRATE 30 MG: 30 TABLET, EXTENDED RELEASE ORAL at 09:03

## 2023-03-29 RX ADMIN — THIAMINE HCL TAB 100 MG 100 MG: 100 TAB at 09:03

## 2023-03-29 RX ADMIN — IBUPROFEN 600 MG: 600 TABLET, FILM COATED ORAL at 09:03

## 2023-03-29 RX ADMIN — CHLORDIAZEPOXIDE HYDROCHLORIDE 50 MG: 25 CAPSULE ORAL at 12:03

## 2023-03-29 RX ADMIN — LORAZEPAM 2 MG: 0.5 TABLET ORAL at 09:03

## 2023-03-29 RX ADMIN — AMLODIPINE BESYLATE 10 MG: 10 TABLET ORAL at 09:03

## 2023-03-29 RX ADMIN — THERA TABS 1 TABLET: TAB at 09:03

## 2023-03-29 RX ADMIN — TRAZODONE HYDROCHLORIDE 300 MG: 100 TABLET ORAL at 09:03

## 2023-03-29 RX ADMIN — LOSARTAN POTASSIUM 100 MG: 50 TABLET, FILM COATED ORAL at 09:03

## 2023-03-29 RX ADMIN — ARIPIPRAZOLE 5 MG: 5 TABLET ORAL at 09:03

## 2023-03-29 RX ADMIN — CHLORDIAZEPOXIDE HYDROCHLORIDE 50 MG: 25 CAPSULE ORAL at 04:03

## 2023-03-29 NOTE — PHARMACY MED REC
"Admission Medication History     The home medication history was taken by Quinton Pantoja.    You may go to "Admission" then "Reconcile Home Medications" tabs to review and/or act upon these items.     The home medication list has been updated by the Pharmacy department.   Please read ALL comments highlighted in yellow.   Please address this information as you see fit.    Feel free to contact us if you have any questions or require assistance.      The medications listed below were removed from the home medication list. Please reorder if appropriate:  Patient reports no longer taking the following medication(s):  NICOTINE 21 MG/24 HR  SUCRALFATE 100 MG /ML SUSP  THIAMINE 100 MG TAB    Medications listed below were obtained from: Patient/family  PTA Medications   Medication Sig    acetaminophen (TYLENOL) 500 MG tablet   Take 500-1,500 mg by mouth daily as needed for Pain.    ARIPiprazole (ABILIFY) 5 MG Tab   Take 5 mg by mouth once daily.    ATROVENT HFA 17 mcg/actuation inhaler   Inhale 2 puffs into the lungs every 6 (six) hours as needed for Wheezing.    dicyclomine (BENTYL) 20 mg tablet   Take 20 mg by mouth 4 (four) times daily.    divalproex (DEPAKOTE SPRINKLE) 125 mg capsule   Take 250 mg by mouth 2 (two) times daily.    DULoxetine (CYMBALTA) 30 MG capsule   Take 1 capsule (30 mg total) by mouth once daily. Take with 60mg =90mg QD    DULoxetine (CYMBALTA) 60 MG capsule   TAKE 1 CAPSULE BY MOUTH EVERY DAY WITH LARGEST MEAL    folic acid (FOLVITE) 1 MG tablet   Take 1 tablet (1 mg total) by mouth once daily.    levothyroxine (SYNTHROID) 50 MCG tablet   Take 1 tablet (50 mcg total) by mouth before breakfast.    loperamide (IMODIUM) 2 mg capsule   Take 2 mg by mouth 4 (four) times daily as needed for Diarrhea (Per package directions).    metFORMIN (GLUCOPHAGE-XR) 500 MG ER 24hr tablet   Take 2 tablets (1,000 mg total) by mouth 2 (two) times daily with meals.    methocarbamoL (ROBAXIN) 750 MG Tab   Take 750 mg by mouth 3 " (three) times daily as needed (Pain).    multivitamin (THERAGRAN) per tablet   Take 1 tablet by mouth once daily.    ondansetron (ZOFRAN ODT) 4 MG TbDL   Take 1 tablet (4 mg total) by mouth every 6 (six) hours as needed (nausea).    pantoprazole (PROTONIX) 40 MG tablet   Take 1 tablet (40 mg total) by mouth every morning.    propranoloL (INDERAL) 20 MG tablet   Take 20 mg by mouth 2 (two) times daily.    traZODone (DESYREL) 300 MG tablet   Take 1 tablet (300 mg total) by mouth every evening.    amLODIPine (NORVASC) 5 MG tablet   Take 1 tablet (5 mg total) by mouth once daily.   Patient hasn't been taking. Last filled 3/3/23 for 90ds      gabapentin (NEURONTIN) 600 MG tablet Take 1 tablet (600 mg total) by mouth 2 (two) times daily.       Potential issues to be addressed PRIOR TO DISCHARGE  Patient reported not taking the following medications: (ARIMIDEX, VALIUM). These medications remain on the home medication list.   Please address accordingly.     Patient requires education regarding drug therapies     Patient requested refills for the following medications: (ABILIFY, CYMBALTA 30 MG, FOLVITE, NEURONTIN, METFORMIN, INDERAL 20 MG)          Quinton Pantoja  [id number]                  .

## 2023-03-29 NOTE — PROGRESS NOTES
Peer2 study:     Subject exited from study today due to non compliance- physician decision (confirmed with sponsor)   - subject will not complete final 3 month visit     We will not be following her RYAN anymore since patient has been exited from the study

## 2023-03-29 NOTE — PROGRESS NOTES
[facility] ([address] Medicine  Progress Note    Patient Name: Earl Abdul  MRN: [id number]  Patient Class: IP- Inpatient   Admission Date: 3/26/2023  Length of Stay: 3 days  Attending Physician: Luis Smith MD  Primary Care Provider: Andrew Rodriguez MD        Subjective:     Principal Problem:Alcohol withdrawal        HPI:  62F with PMH EtOH use disorder, diabetes, COPD, hypertension, hypothyroidism, breast cancer, alcohol induced cirrhosis, sarcoidosis presents from home for alcohol withdrawal. Her  at bedside states he found her on the floor of their home this morning. He was able to arouse her and brought her to the ED. She reports her last drink was this morning around 10am. For the last week, she has been drinking one fifth of vodka per day. She has been to the hospital for withdrawal many times, and she has a history of withdrawal seizures about 6 years ago, according to patient and .    In the ED, patient is restless and mildly agitated with  and /76. Labs notable for WBC 13, CMP is within normal limits. EtOH level 253, lactate 2.4. Patient had nausea and vomiting in ED, received Zofran, metoclopramide, and 1L LR. Received Ativan 2mg for withdrawal symptoms. Admitted to medicine for management of alcohol withdrawal.       Overview/Hospital Course:  Patient admitted to  for alcohol withdrawal. Undergoing Librium taper with PRN ativan.      Interval History: Improved overnight and more interested in going home. Required PRN ativan x3 overnight.    Review of Systems   Constitutional:  Negative for chills, diaphoresis and fever.   HENT:  Negative for rhinorrhea and sore throat.    Eyes:  Negative for visual disturbance.   Respiratory:  Negative for cough and shortness of breath.    Cardiovascular:  Negative for chest pain and leg swelling.   Gastrointestinal:  Negative for diarrhea, nausea and vomiting.   Genitourinary:  Negative for  dysuria.   Musculoskeletal:  Negative for arthralgias and myalgias.   Skin:  Negative for rash.   Neurological:  Positive for tremors. Negative for light-headedness and headaches.   Psychiatric/Behavioral:  Negative for agitation and confusion.    Objective:     Vital Signs (Most Recent):  Temp: 97.5 °F (36.4 °C) (03/29/23 0801)  Pulse: 103 (03/29/23 1508)  Resp: 16 (03/29/23 0801)  BP: (!) 162/71 (03/29/23 0801)  SpO2: (!) 94 % (03/29/23 0353)   Vital Signs (24h Range):  Temp:  [97.1 °F (36.2 °C)-98.3 °F (36.8 °C)] 97.5 °F (36.4 °C)  Pulse:  [] 103  Resp:  [16-18] 16  SpO2:  [94 %-95 %] 94 %  BP: (132-165)/(61-71) 162/71     Weight: 86 kg (189 lb 9.5 oz)  Body mass index is 30.6 kg/m².    Intake/Output Summary (Last 24 hours) at 3/29/2023 1523  Last data filed at 3/29/2023 0849  Gross per 24 hour   Intake 753.55 ml   Output --   Net 753.55 ml        Physical Exam  Constitutional:       Appearance: She is obese. She is not diaphoretic.   HENT:      Head: Normocephalic and atraumatic.      Right Ear: External ear normal.      Left Ear: External ear normal.      Nose: Nose normal.      Mouth/Throat:      Mouth: Mucous membranes are moist.      Pharynx: Oropharynx is clear.   Eyes:      General: No scleral icterus.     Extraocular Movements: Extraocular movements intact.      Conjunctiva/sclera: Conjunctivae normal.   Cardiovascular:      Rate and Rhythm: Normal rate and regular rhythm.   Pulmonary:      Effort: Pulmonary effort is normal.      Breath sounds: Normal breath sounds. No wheezing or rales.   Abdominal:      General: Abdomen is flat. Bowel sounds are normal. There is no distension.      Palpations: Abdomen is soft.      Tenderness: There is no abdominal tenderness.   Musculoskeletal:         General: No swelling. Normal range of motion.      Cervical back: Normal range of motion.   Skin:     General: Skin is warm and dry.   Neurological:      General: No focal deficit present.   Psychiatric:          Mood and Affect: Mood normal.         Behavior: Behavior normal.       Significant Labs: All pertinent labs within the past 24 hours have been reviewed.    Significant Imaging: I have reviewed all pertinent imaging results/findings within the past 24 hours.      Assessment/Plan:      * Alcohol withdrawal  62F with hx EtOH use disorder, DM2, HTN, depression/anxiety presents after 1-week binge of 1 fifth vodka/day. BIB  after her found her unconscious, was able to wake her and brought her to ED. Multiple presentations for withdrawal previously. EtOH level 253, lactate 2.4. Lactate normalized following fluid administration.    -seizure precautions, aspiration precautions, fall precautions  -continue Librium taper with PRN Ativan 2mg for CIWA>8 or withdrawal symptoms  -continue home thiamine and folate supplementation, multivitamin        Alcohol use disorder, severe, dependence  Longstanding EtOH use disorder, would benefit from community resources for alcohol cessation.    -consider acamprosate or naltrexone on discharge      COPD (chronic obstructive pulmonary disease)  Continue home ipratropium PRN      Type 2 diabetes mellitus with hyperglycemia  Patient's FSGs are controlled on current medication regimen.  Last A1c reviewed-   Lab Results   Component Value Date    HGBA1C 5.1 03/26/2023     Most recent fingerstick glucose reviewed-   Recent Labs   Lab 03/28/23  1711 03/28/23  2241 03/29/23  0739 03/29/23  1137   POCTGLUCOSE 144* 185* 170* 137*     Current correctional scale  Low  Maintain anti-hyperglycemic dose as follows-   Antihyperglycemics (From admission, onward)    Start     Stop Route Frequency Ordered    03/26/23 1713  insulin aspart U-100 pen 0-5 Units         -- SubQ Before meals & nightly PRN 03/26/23 1613        Hold Oral hypoglycemics while patient is in the hospital.    Essential hypertension  Continue home amlodipine       Depression with anxiety  Status of psychiatric conditions is difficult  to elucidate due to alcohol use disorder.     -continue home aripiprazole, duloxetine        Hypothyroidism  Continue home levothyroxine        VTE Risk Mitigation (From admission, onward)         Ordered     enoxaparin injection 40 mg  Daily         03/26/23 1634     IP VTE HIGH RISK PATIENT  Once         03/26/23 1446     Place sequential compression device  Until discontinued         03/26/23 1446                Discharge Planning   BECCA: 3/31/2023     Code Status: Full Code   Is the patient medically ready for discharge?:     Reason for patient still in hospital (select all that apply): Treatment and Pending disposition  Discharge Plan A: Home with family   Discharge Delays: None known at this time              MERISSA JACKSON MD  Department of Hospital Medicine   [facility] - Intensive Care ([place])

## 2023-03-29 NOTE — ASSESSMENT & PLAN NOTE
Longstanding EtOH use disorder, would benefit from community resources for alcohol cessation.    -consider acamprosate or naltrexone on discharge

## 2023-03-29 NOTE — SUBJECTIVE & OBJECTIVE
Interval History: Improved overnight and more interested in going home. Required PRN ativan x3 overnight.    Review of Systems   Constitutional:  Negative for chills, diaphoresis and fever.   HENT:  Negative for rhinorrhea and sore throat.    Eyes:  Negative for visual disturbance.   Respiratory:  Negative for cough and shortness of breath.    Cardiovascular:  Negative for chest pain and leg swelling.   Gastrointestinal:  Negative for diarrhea, nausea and vomiting.   Genitourinary:  Negative for dysuria.   Musculoskeletal:  Negative for arthralgias and myalgias.   Skin:  Negative for rash.   Neurological:  Positive for tremors. Negative for light-headedness and headaches.   Psychiatric/Behavioral:  Negative for agitation and confusion.    Objective:     Vital Signs (Most Recent):  Temp: 97.5 °F (36.4 °C) (03/29/23 0801)  Pulse: 103 (03/29/23 1508)  Resp: 16 (03/29/23 0801)  BP: (!) 162/71 (03/29/23 0801)  SpO2: (!) 94 % (03/29/23 0353)   Vital Signs (24h Range):  Temp:  [97.1 °F (36.2 °C)-98.3 °F (36.8 °C)] 97.5 °F (36.4 °C)  Pulse:  [] 103  Resp:  [16-18] 16  SpO2:  [94 %-95 %] 94 %  BP: (132-165)/(61-71) 162/71     Weight: 86 kg (189 lb 9.5 oz)  Body mass index is 30.6 kg/m².    Intake/Output Summary (Last 24 hours) at 3/29/2023 1523  Last data filed at 3/29/2023 0849  Gross per 24 hour   Intake 753.55 ml   Output --   Net 753.55 ml        Physical Exam  Constitutional:       Appearance: She is obese. She is not diaphoretic.   HENT:      Head: Normocephalic and atraumatic.      Right Ear: External ear normal.      Left Ear: External ear normal.      Nose: Nose normal.      Mouth/Throat:      Mouth: Mucous membranes are moist.      Pharynx: Oropharynx is clear.   Eyes:      General: No scleral icterus.     Extraocular Movements: Extraocular movements intact.      Conjunctiva/sclera: Conjunctivae normal.   Cardiovascular:      Rate and Rhythm: Normal rate and regular rhythm.   Pulmonary:      Effort: Pulmonary  effort is normal.      Breath sounds: Normal breath sounds. No wheezing or rales.   Abdominal:      General: Abdomen is flat. Bowel sounds are normal. There is no distension.      Palpations: Abdomen is soft.      Tenderness: There is no abdominal tenderness.   Musculoskeletal:         General: No swelling. Normal range of motion.      Cervical back: Normal range of motion.   Skin:     General: Skin is warm and dry.   Neurological:      General: No focal deficit present.   Psychiatric:         Mood and Affect: Mood normal.         Behavior: Behavior normal.       Significant Labs: All pertinent labs within the past 24 hours have been reviewed.    Significant Imaging: I have reviewed all pertinent imaging results/findings within the past 24 hours.

## 2023-03-29 NOTE — PLAN OF CARE
[facility] ([facility])  Discharge Reassessment    Primary Care Provider: Andrew Rodriguez MD    Expected Discharge Date: 3/31/2023    Reassessment (most recent)       Discharge Reassessment - 03/29/23 1059          Discharge Reassessment    Assessment Type Discharge Planning Reassessment (P)      Did the patient's condition or plan change since previous assessment? Yes (P)    confused    Discharge Plan A Home with family (P)      Discharge Plan B Other (P)    etoh rehab    DME Needed Upon Discharge  none (P)      Discharge Barriers Identified None (P)      Why the patient remains in the hospital Requires continued medical care (P)         Post-Acute Status    Discharge Delays None known at this time (P)                  Pt is confused today plan was dc home and IOP, when pt was alert and oriented CM will cont to monitor for any changes in this plan,

## 2023-03-29 NOTE — ASSESSMENT & PLAN NOTE
Patient's FSGs are controlled on current medication regimen.  Last A1c reviewed-   Lab Results   Component Value Date    HGBA1C 5.1 03/26/2023     Most recent fingerstick glucose reviewed-   Recent Labs   Lab 03/28/23  1711 03/28/23  2241 03/29/23  0739 03/29/23  1137   POCTGLUCOSE 144* 185* 170* 137*     Current correctional scale  Low  Maintain anti-hyperglycemic dose as follows-   Antihyperglycemics (From admission, onward)    Start     Stop Route Frequency Ordered    03/26/23 1713  insulin aspart U-100 pen 0-5 Units         -- SubQ Before meals & nightly PRN 03/26/23 1613        Hold Oral hypoglycemics while patient is in the hospital.

## 2023-03-30 ENCOUNTER — TELEPHONE (OUTPATIENT)
Dept: INTERNAL MEDICINE | Facility: CLINIC | Age: 63
End: 2023-03-30
Payer: COMMERCIAL

## 2023-03-30 ENCOUNTER — PATIENT MESSAGE (OUTPATIENT)
Dept: INTERNAL MEDICINE | Facility: CLINIC | Age: 63
End: 2023-03-30
Payer: COMMERCIAL

## 2023-03-30 VITALS
WEIGHT: 189.63 LBS | HEIGHT: 66 IN | RESPIRATION RATE: 15 BRPM | TEMPERATURE: 99 F | DIASTOLIC BLOOD PRESSURE: 60 MMHG | HEART RATE: 89 BPM | BODY MASS INDEX: 30.47 KG/M2 | SYSTOLIC BLOOD PRESSURE: 124 MMHG | OXYGEN SATURATION: 90 %

## 2023-03-30 LAB
ALBUMIN SERPL BCP-MCNC: 3.2 G/DL (ref 3.5–5.2)
ALP SERPL-CCNC: 42 U/L (ref 55–135)
ALT SERPL W/O P-5'-P-CCNC: 17 U/L (ref 10–44)
ANION GAP SERPL CALC-SCNC: 9 MMOL/L (ref 8–16)
AST SERPL-CCNC: 24 U/L (ref 10–40)
BASOPHILS # BLD AUTO: 0.01 K/UL (ref 0–0.2)
BASOPHILS NFR BLD: 0.3 % (ref 0–1.9)
BILIRUB SERPL-MCNC: 0.4 MG/DL (ref 0.1–1)
BUN SERPL-MCNC: 8 MG/DL (ref 8–23)
CALCIUM SERPL-MCNC: 8.8 MG/DL (ref 8.7–10.5)
CHLORIDE SERPL-SCNC: 104 MMOL/L (ref 95–110)
CO2 SERPL-SCNC: 25 MMOL/L (ref 23–29)
CREAT SERPL-MCNC: 1.2 MG/DL (ref 0.5–1.4)
DIFFERENTIAL METHOD: ABNORMAL
EOSINOPHIL # BLD AUTO: 0 K/UL (ref 0–0.5)
EOSINOPHIL NFR BLD: 1.3 % (ref 0–8)
ERYTHROCYTE [DISTWIDTH] IN BLOOD BY AUTOMATED COUNT: 18.6 % (ref 11.5–14.5)
EST. GFR  (NO RACE VARIABLE): 50.5 ML/MIN/1.73 M^2
GLUCOSE SERPL-MCNC: 156 MG/DL (ref 70–110)
HCT VFR BLD AUTO: 34 % (ref 37–48.5)
HGB BLD-MCNC: 9.9 G/DL (ref 12–16)
IMM GRANULOCYTES # BLD AUTO: 0.01 K/UL (ref 0–0.04)
IMM GRANULOCYTES NFR BLD AUTO: 0.3 % (ref 0–0.5)
LYMPHOCYTES # BLD AUTO: 1.8 K/UL (ref 1–4.8)
LYMPHOCYTES NFR BLD: 57.4 % (ref 18–48)
MAGNESIUM SERPL-MCNC: 1.6 MG/DL (ref 1.6–2.6)
MCH RBC QN AUTO: 27.5 PG (ref 27–31)
MCHC RBC AUTO-ENTMCNC: 29.1 G/DL (ref 32–36)
MCV RBC AUTO: 94 FL (ref 82–98)
MONOCYTES # BLD AUTO: 0.4 K/UL (ref 0.3–1)
MONOCYTES NFR BLD: 13.1 % (ref 4–15)
NEUTROPHILS # BLD AUTO: 0.8 K/UL (ref 1.8–7.7)
NEUTROPHILS NFR BLD: 27.6 % (ref 38–73)
NRBC BLD-RTO: 0 /100 WBC
PHOSPHATE SERPL-MCNC: 4.6 MG/DL (ref 2.7–4.5)
PLATELET # BLD AUTO: 78 K/UL (ref 150–450)
PMV BLD AUTO: 10.8 FL (ref 9.2–12.9)
POCT GLUCOSE: 133 MG/DL (ref 70–110)
POCT GLUCOSE: 177 MG/DL (ref 70–110)
POTASSIUM SERPL-SCNC: 3.5 MMOL/L (ref 3.5–5.1)
PROT SERPL-MCNC: 5.5 G/DL (ref 6–8.4)
RBC # BLD AUTO: 3.6 M/UL (ref 4–5.4)
SODIUM SERPL-SCNC: 138 MMOL/L (ref 136–145)
WBC # BLD AUTO: 3.05 K/UL (ref 3.9–12.7)

## 2023-03-30 PROCEDURE — 99239 HOSP IP/OBS DSCHRG MGMT >30: CPT | Mod: ,,, | Performed by: STUDENT IN AN ORGANIZED HEALTH CARE EDUCATION/TRAINING PROGRAM

## 2023-03-30 PROCEDURE — S4991 NICOTINE PATCH NONLEGEND: HCPCS

## 2023-03-30 PROCEDURE — 85025 COMPLETE CBC W/AUTO DIFF WBC: CPT

## 2023-03-30 PROCEDURE — 99239 PR HOSPITAL DISCHARGE DAY,>30 MIN: ICD-10-PCS | Mod: ,,, | Performed by: STUDENT IN AN ORGANIZED HEALTH CARE EDUCATION/TRAINING PROGRAM

## 2023-03-30 PROCEDURE — 63600175 PHARM REV CODE 636 W HCPCS

## 2023-03-30 PROCEDURE — 80053 COMPREHEN METABOLIC PANEL: CPT

## 2023-03-30 PROCEDURE — 83735 ASSAY OF MAGNESIUM: CPT

## 2023-03-30 PROCEDURE — 84100 ASSAY OF PHOSPHORUS: CPT

## 2023-03-30 PROCEDURE — 25000003 PHARM REV CODE 250: Performed by: STUDENT IN AN ORGANIZED HEALTH CARE EDUCATION/TRAINING PROGRAM

## 2023-03-30 PROCEDURE — 25000003 PHARM REV CODE 250: Performed by: INTERNAL MEDICINE

## 2023-03-30 PROCEDURE — 25000003 PHARM REV CODE 250

## 2023-03-30 PROCEDURE — 36415 COLL VENOUS BLD VENIPUNCTURE: CPT

## 2023-03-30 RX ORDER — LOSARTAN POTASSIUM 100 MG/1
100 TABLET ORAL DAILY
Qty: 90 TABLET | Refills: 3 | Status: SHIPPED | OUTPATIENT
Start: 2023-03-31 | End: 2023-04-26

## 2023-03-30 RX ORDER — CHLORDIAZEPOXIDE HYDROCHLORIDE 25 MG/1
25 CAPSULE, GELATIN COATED ORAL ONCE
Status: COMPLETED | OUTPATIENT
Start: 2023-03-30 | End: 2023-03-30

## 2023-03-30 RX ORDER — LANOLIN ALCOHOL/MO/W.PET/CERES
100 CREAM (GRAM) TOPICAL DAILY
Qty: 90 TABLET | Refills: 3 | Status: SHIPPED | OUTPATIENT
Start: 2023-03-30 | End: 2023-04-26

## 2023-03-30 RX ORDER — CHLORDIAZEPOXIDE HYDROCHLORIDE 25 MG/1
25 CAPSULE, GELATIN COATED ORAL EVERY 8 HOURS
Status: DISCONTINUED | OUTPATIENT
Start: 2023-03-30 | End: 2023-03-30

## 2023-03-30 RX ORDER — CHLORDIAZEPOXIDE HYDROCHLORIDE 25 MG/1
25 CAPSULE, GELATIN COATED ORAL 2 TIMES DAILY
Status: DISCONTINUED | OUTPATIENT
Start: 2023-03-30 | End: 2023-03-30

## 2023-03-30 RX ORDER — AMLODIPINE BESYLATE 10 MG/1
10 TABLET ORAL DAILY
Qty: 90 TABLET | Refills: 3 | Status: SHIPPED | OUTPATIENT
Start: 2023-03-31 | End: 2023-04-06

## 2023-03-30 RX ORDER — CHLORDIAZEPOXIDE HYDROCHLORIDE 25 MG/1
25 CAPSULE, GELATIN COATED ORAL 2 TIMES DAILY
Qty: 1 CAPSULE | Refills: 0 | Status: ON HOLD | OUTPATIENT
Start: 2023-03-30 | End: 2023-04-03 | Stop reason: HOSPADM

## 2023-03-30 RX ORDER — ISOSORBIDE MONONITRATE 30 MG/1
30 TABLET, EXTENDED RELEASE ORAL NIGHTLY
Qty: 90 TABLET | Refills: 3 | Status: SHIPPED | OUTPATIENT
Start: 2023-03-30 | End: 2023-05-15

## 2023-03-30 RX ORDER — ACAMPROSATE CALCIUM 333 MG/1
666 TABLET, DELAYED RELEASE ORAL 3 TIMES DAILY
Qty: 180 TABLET | Refills: 0 | Status: ON HOLD | OUTPATIENT
Start: 2023-03-30 | End: 2023-04-30 | Stop reason: HOSPADM

## 2023-03-30 RX ADMIN — Medication 1 PATCH: at 08:03

## 2023-03-30 RX ADMIN — LOSARTAN POTASSIUM 100 MG: 50 TABLET, FILM COATED ORAL at 08:03

## 2023-03-30 RX ADMIN — DULOXETINE 30 MG: 30 CAPSULE, DELAYED RELEASE ORAL at 08:03

## 2023-03-30 RX ADMIN — ARIPIPRAZOLE 5 MG: 5 TABLET ORAL at 08:03

## 2023-03-30 RX ADMIN — CHLORDIAZEPOXIDE HYDROCHLORIDE 25 MG: 25 CAPSULE ORAL at 04:03

## 2023-03-30 RX ADMIN — LEVOTHYROXINE SODIUM 50 MCG: 50 TABLET ORAL at 05:03

## 2023-03-30 RX ADMIN — FOLIC ACID 1 MG: 1 TABLET ORAL at 08:03

## 2023-03-30 RX ADMIN — ACETAMINOPHEN 650 MG: 325 TABLET ORAL at 08:03

## 2023-03-30 RX ADMIN — AMLODIPINE BESYLATE 10 MG: 10 TABLET ORAL at 08:03

## 2023-03-30 RX ADMIN — CHLORDIAZEPOXIDE HYDROCHLORIDE 50 MG: 25 CAPSULE ORAL at 05:03

## 2023-03-30 RX ADMIN — CHLORDIAZEPOXIDE HYDROCHLORIDE 25 MG: 25 CAPSULE ORAL at 09:03

## 2023-03-30 RX ADMIN — SODIUM CHLORIDE, POTASSIUM CHLORIDE, SODIUM LACTATE AND CALCIUM CHLORIDE 500 ML: 600; 310; 30; 20 INJECTION, SOLUTION INTRAVENOUS at 10:03

## 2023-03-30 RX ADMIN — MUPIROCIN: 20 OINTMENT TOPICAL at 08:03

## 2023-03-30 RX ADMIN — THERA TABS 1 TABLET: TAB at 08:03

## 2023-03-30 RX ADMIN — THIAMINE HCL TAB 100 MG 100 MG: 100 TAB at 08:03

## 2023-03-30 NOTE — PLAN OF CARE
HH referral sent to [facility], notified attending pt insurance may not cover home therapy since she did not receive any therapy here

## 2023-03-30 NOTE — NURSING
Discharge instructions given to patient and spouse, both verbalize understanding. PIV removed. Educated on discharge medications. 1600 Librium dose given. Patient left in stable condition via w/c to  transport.

## 2023-03-30 NOTE — PLAN OF CARE
Problem: Diabetes Comorbidity  Goal: Blood Glucose Level Within Targeted Range  Outcome: Ongoing, Progressing     Problem: Skin Injury Risk Increased  Goal: Skin Health and Integrity  Outcome: Ongoing, Progressing     Problem: Alcohol Withdrawal  Goal: Alcohol Withdrawal Symptom Control  Outcome: Ongoing, Progressing     Problem: Fall Injury Risk  Goal: Absence of Fall and Fall-Related Pt . Able to express needs.  Periods of confusion and needs frequent re-orientation.  Alcohol withdrawal symptom control with meds and bedrest.  Telesitter in use.  Bed alarm on.   Telemetry monitoring.  Safety maintained.  Bed in low position,  call  light in reach.

## 2023-03-30 NOTE — TELEPHONE ENCOUNTER
Pt called back in sep encounter  Called pt   Tried to move appt   She declined something sooner w/ another provider  Kirsten foote  Asked how she was feeling to see if she needed immediate medical attention and she informed me she is still in hosp being d/c this afternoon  I confirmed her appt details with her  Pt verbalized understanding and had no further concerns or questions.

## 2023-03-30 NOTE — TELEPHONE ENCOUNTER
----- Message from Sophia Daniels sent at 3/30/2023  3:50 PM CDT -----  Name of Who is Calling: MARTY SALDAÑA [[id number]]              What is the request in detail: Patient requesting a call back to discuss scheduling a hospital F/U within 7 days. Patient is scheduled for 4/12              Can the clinic reply by [facility]: No              What Number to Call Back if not in [facility]: [phone]

## 2023-03-30 NOTE — DISCHARGE SUMMARY
[facility] ([place] [address])  [facility] Medicine  Discharge Summary      Patient Name: Earl Abdul  MRN: [id number]  IZA: [id number]  Patient Class: IP- Inpatient  Admission Date: 3/26/2023  Hospital Length of Stay: 4 days  Discharge Date and Time:  03/30/2023 11:28 AM  Attending Physician: Tara Javier DO   Discharging Provider: MERISSA JACKSON MD  Primary Care Provider: Andrew Rodriguez MD  [facility] Medicine Team: [facility] MERISSA JACKSON MD  Primary Care Team: [facility]    HPI:   62F with PMH EtOH use disorder, diabetes, COPD, hypertension, hypothyroidism, breast cancer, alcohol induced cirrhosis, sarcoidosis presents from home for alcohol withdrawal. Her  at bedside states he found her on the floor of their home this morning. He was able to arouse her and brought her to the ED. She reports her last drink was this morning around 10am. For the last week, she has been drinking one fifth of vodka per day. She has been to the hospital for withdrawal many times, and she has a history of withdrawal seizures about 6 years ago, according to patient and .    In the ED, patient is restless and mildly agitated with  and /76. Labs notable for WBC 13, CMP is within normal limits. EtOH level 253, lactate 2.4. Patient had nausea and vomiting in ED, received Zofran, metoclopramide, and 1L LR. Received Ativan 2mg for withdrawal symptoms. Admitted to medicine for management of alcohol withdrawal.       * No surgery found *      Hospital Course:   Patient admitted to  for alcohol withdrawal. Completed Librium taper with PRN ativan, discharged with final dose of taper to be taken the morning after discharge. Started on acamprosate at discharge, instructed to follow-up with PCP for further management. Patient intending to begin SMART program for alcohol cessation.    BP (!) 122/59 (BP Location: Left leg, Patient Position: Lying)   Pulse 79   Temp 98.1 °F (36.7 °C) (Oral)   Resp  "16   Ht 5' 6" (1.676 m)   Wt 86 kg (189 lb 9.5 oz)   SpO2 97%   Breastfeeding No   BMI 30.60 kg/m²     Physical Exam  Constitutional:       Appearance: She is obese. She is not diaphoretic.   HENT:      Head: Normocephalic and atraumatic.      Right Ear: External ear normal.      Left Ear: External ear normal.      Nose: Nose normal.      Mouth/Throat:      Mouth: Mucous membranes are moist.      Pharynx: Oropharynx is clear.   Eyes:      General: No scleral icterus.     Extraocular Movements: Extraocular movements intact.      Conjunctiva/sclera: Conjunctivae normal.   Cardiovascular:      Rate and Rhythm: Normal rate and regular rhythm.   Pulmonary:      Effort: Pulmonary effort is normal.      Breath sounds: Normal breath sounds. No wheezing or rales.   Abdominal:      General: Abdomen is flat. Bowel sounds are normal. There is no distension.      Palpations: Abdomen is soft.      Tenderness: There is no abdominal tenderness.   Musculoskeletal:         General: No swelling. Normal range of motion.      Cervical back: Normal range of motion.   Skin:     General: Skin is warm and dry.   Neurological:      General: No focal deficit present.   Psychiatric:         Mood and Affect: Mood normal.         Behavior: Behavior normal.      Goals of Care Treatment Preferences:  Code Status: Full Code      Consults:     No new Assessment & Plan notes have been filed under this hospital service since the last note was generated.  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.10] 09/27/2022 Yes    Alcohol use disorder, severe, dependence [F10.20] 02/07/2014 Yes     Chronic    COPD (chronic obstructive pulmonary disease) [J44.9] 04/17/2021 Yes    Type 2 diabetes mellitus with hyperglycemia [E11.65] 11/30/2021 Yes "    Essential hypertension [I10] 02/07/2014 Yes    Depression with anxiety [F41.8] 08/16/2017 Yes    Hypothyroidism [E03.9] 11/30/2021 Yes      Problems Resolved During this Admission:    Diagnosis Date Noted Date Resolved POA    Anxiety and Depression [F32.A] 02/07/2014 03/26/2023 Yes       Discharged Condition: stable    Disposition: Home or Self Care    Follow Up:   Follow-up Information     Andrew Rodriguez MD. Schedule an appointment as soon as possible for a visit in 1 week(s).    Specialty: Internal Medicine  Contact information:  [address]  [address]  [facility] [address]  [phone]                       Patient Instructions:      Diet Adult Regular     Reason for not Ordering Smoking Cessation Referral     Order Specific Question Answer Comments   Reason for not ordering: Patient refused      Reason for not Prescribing Nicotine Replacement     Order Specific Question Answer Comments   Reason for not Prescribing: Patient refused        Significant Diagnostic Studies: Labs: All labs within the past 24 hours have been reviewed    Pending Diagnostic Studies:     Procedure Component Value Units Date/Time    Brain natriuretic peptide [[id number]] Collected: 03/26/23 1128    Order Status: Sent Lab Status: In process Updated: 03/26/23 1131    Specimen: Blood     Vitamin B1 [[id number]] Collected: 03/26/23 1128    Order Status: Sent Lab Status: In process Updated: 03/26/23 1131    Specimen: Blood          Medications:  Reconciled Home Medications:      Medication List      START taking these medications    acamprosate 333 mg tablet  Commonly known as: CAMPRAL  Take 2 tablets (666 mg total) by mouth 3 (three) times daily.     chlordiazepoxide 25 MG Cap  Commonly known as: LIBRIUM  Take 1 capsule (25 mg total) by mouth 2 (two) times a day. Take one capsule the morning after discharge (3/31/2023) for 1 day     isosorbide mononitrate 30 MG 24 hr tablet  Commonly known as: IMDUR  Take 1 tablet (30 mg  total) by mouth every evening.     losartan 100 MG tablet  Commonly known as: COZAAR  Take 1 tablet (100 mg total) by mouth once daily.  Start taking on: March 31, 2023        CHANGE how you take these medications    amLODIPine 10 MG tablet  Commonly known as: NORVASC  Take 1 tablet (10 mg total) by mouth once daily.  Start taking on: March 31, 2023  What changed:   · medication strength  · how much to take        CONTINUE taking these medications    acetaminophen 500 MG tablet  Commonly known as: TYLENOL  Take 500-1,500 mg by mouth daily as needed for Pain.     ARIPiprazole 5 MG Tab  Commonly known as: ABILIFY  Take 5 mg by mouth once daily.     ATROVENT HFA 17 mcg/actuation inhaler  Generic drug: ipratropium  Inhale 2 puffs into the lungs every 6 (six) hours as needed for Wheezing.     dicyclomine 20 mg tablet  Commonly known as: BENTYL  Take 20 mg by mouth 4 (four) times daily.     divalproex 125 mg capsule  Commonly known as: DEPAKOTE SPRINKLE  Take 250 mg by mouth 2 (two) times daily.     * DULoxetine 60 MG capsule  Commonly known as: CYMBALTA  TAKE 1 CAPSULE BY MOUTH EVERY DAY WITH LARGEST MEAL     * DULoxetine 30 MG capsule  Commonly known as: CYMBALTA  Take 1 capsule (30 mg total) by mouth once daily. Take with 60mg =90mg QD     folic acid 1 MG tablet  Commonly known as: FOLVITE  Take 1 tablet (1 mg total) by mouth once daily.     gabapentin 600 MG tablet  Commonly known as: NEURONTIN  Take 1 tablet (600 mg total) by mouth 2 (two) times daily.     levothyroxine 50 MCG tablet  Commonly known as: SYNTHROID  Take 1 tablet (50 mcg total) by mouth before breakfast.     loperamide 2 mg capsule  Commonly known as: IMODIUM  Take 2 mg by mouth 4 (four) times daily as needed for Diarrhea (Per package directions).     metFORMIN 500 MG ER 24hr tablet  Commonly known as: GLUCOPHAGE-XR  Take 2 tablets (1,000 mg total) by mouth 2 (two) times daily with meals.     methocarbamoL 750 MG Tab  Commonly known as: ROBAXIN  Take  750 mg by mouth 3 (three) times daily as needed (Pain).     multivitamin per tablet  Commonly known as: THERAGRAN  Take 1 tablet by mouth once daily.     ondansetron 4 MG Tbdl  Commonly known as: ZOFRAN ODT  Take 1 tablet (4 mg total) by mouth every 6 (six) hours as needed (nausea).     pantoprazole 40 MG tablet  Commonly known as: PROTONIX  Take 1 tablet (40 mg total) by mouth every morning.     propranoloL 20 MG tablet  Commonly known as: INDERAL  Take 20 mg by mouth 2 (two) times daily.     thiamine 100 MG tablet  Take 1 tablet (100 mg total) by mouth once daily.     traZODone 300 MG tablet  Commonly known as: DESYREL  Take 1 tablet (300 mg total) by mouth every evening.         * This list has 2 medication(s) that are the same as other medications prescribed for you. Read the directions carefully, and ask your doctor or other care provider to review them with you.            STOP taking these medications    diazePAM 10 MG Tab  Commonly known as: VALIUM        ASK your doctor about these medications    anastrozole 1 mg Tab  Commonly known as: ARIMIDEX  Take 1 tablet (1 mg total) by mouth every morning.            Indwelling Lines/Drains at time of discharge:   Lines/Drains/Airways     None                 Time spent on the discharge of patient: 35 minutes         MERISSA JACKSON MD  Department of Hospital Medicine  [facility] - Intensive Care ([place])

## 2023-03-30 NOTE — TELEPHONE ENCOUNTER
Called pt to offer to move hosp f/u and to see if she's ok seeing another provider so we can get her in sooner  Left detailed VM  Routing for two more attempts

## 2023-03-30 NOTE — TELEPHONE ENCOUNTER
----- Message from [facility] sent at 3/30/2023  2:23 PM CDT -----  Type:  Needs Medical Advice    Who Called: Case Management   Would the patient rather a call back or a response via [facility]? Callback   Best Call Back Number: [phone]  Additional Information: Case management requesting callback from office to see if hosp follow up appt can be scheduled sooner

## 2023-03-30 NOTE — TELEPHONE ENCOUNTER
----- Message from Monie Sanz sent at 3/30/2023  3:24 PM CDT -----  Contact: MARTY SALDAÑA [[id number]]  Type:  Patient Returning Call      Who Called:   MARTY SALDAÑA [[id number]]      Who Left Message for Patient: Eun Rodriguez MA      Does the patient know what this is regarding?: No      Would the patient rather a call back or a response via [facility]?  Call back       Best Call Back Number: [phone] ([place])       Additional Information:

## 2023-03-30 NOTE — PLAN OF CARE
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    Allergies:  Review of patient's allergies indicates:   Allergen Reactions    Lortab [hydrocodone-acetaminophen] Itching    Promethazine Itching and Other (See Comments)       Medications: Review discharge medications with patient and family and provide education.    Current Facility-Administered Medications   Medication Dose Route Frequency Provider Last Rate Last Admin    acetaminophen tablet 650 mg  650 mg Oral Q6H PRN Kirk Garsia MD   650 mg at 03/30/23 0825    amLODIPine tablet 10 mg  10 mg Oral Daily Luis Smith MD   10 mg at 03/30/23 0825    ARIPiprazole tablet 5 mg  5 mg Oral Daily Denilson Hurd MD   5 mg at 03/30/23 0826    chlordiazepoxide capsule 25 mg  25 mg Oral Once Denilson Hurd MD        dextrose 10% bolus 125 mL 125 mL  12.5 g Intravenous PRN Denilson SULLIVAN  MD [place]        dextrose 10% bolus 250 mL 250 mL  25 g Intravenous PRN Denilson Hurd MD        dextrose 40 % gel 15,000 mg  15 g Oral PRN Denilson Hurd MD        dextrose 40 % gel 30,000 mg  30 g Oral PRN Denilson Hurd MD        DULoxetine DR capsule 30 mg  30 mg Oral Daily Denilson Hurd MD   30 mg at 03/30/23 0825    enoxaparin injection 40 mg  40 mg Subcutaneous Daily Denilson Hurd MD   40 mg at 03/29/23 1714    folic acid tablet 1 mg  1 mg Oral Daily Denilson Hurd MD   1 mg at 03/30/23 0825    glucagon (human recombinant) injection 1 mg  1 mg Intramuscular PRN Denilson Hurd MD        ibuprofen tablet 600 mg  600 mg Oral Q6H PRN Kirk Garsia MD   600 mg at 03/29/23 2128    insulin aspart U-100 pen 0-5 Units  0-5 Units Subcutaneous QID (AC + HS) PRN Denilson Hurd MD        ipratropium inhaler 2 puff  2 puff Inhalation Q6H PRN Denilson Hurd MD        isosorbide mononitrate 24 hr tablet 30 mg  30 mg Oral QHS Luis Smith MD   30 mg at 03/29/23 2124    levothyroxine tablet 50 mcg  50 mcg Oral Before breakfast Denilson Hurd MD   50 mcg at 03/30/23 0534    loperamide capsule 2 mg  2 mg Oral QID PRN Kirk Garsia MD   2 mg at 03/27/23 2113    losartan tablet 100 mg  100 mg Oral Daily Luis Smith MD   100 mg at 03/30/23 0825    melatonin tablet 6 mg  6 mg Oral Nightly PRN Eun Mccoy MD   6 mg at 03/27/23 2112    multivitamin tablet  1 tablet Oral Daily Denilson Hurd MD   1 tablet at 03/30/23 0825    mupirocin 2 % ointment   Nasal BID Luis Smith MD   Given at 03/30/23 0827    nicotine 21 mg/24 hr 1 patch  1 patch Transdermal Daily Denilson Hurd MD   1 patch at 03/30/23 0827    ondansetron disintegrating tablet 4 mg  4 mg Oral Q8H PRN Connor M Gillies, MD   4 mg at 03/29/23 1028    sodium chloride 0.9% flush 10 mL  10 mL Intravenous PRN Eun Mccoy MD        thiamine tablet 100 mg  100 mg Oral Daily Denilson Hurd MD   100 mg at 03/30/23 0825    traZODone tablet 300 mg  300 mg Oral  Nightly PRN Kirk Garsia MD   300 mg at 03/29/23 2128     Facility-Administered Medications Ordered in Other Encounters   Medication Dose Route Frequency Provider Last Rate Last Admin    albuterol sulfate nebulizer solution 2.5 mg  2.5 mg Nebulization Once Andrew Rodriguez MD         Current Discharge Medication List        START taking these medications    Details   acamprosate (CAMPRAL) 333 mg tablet Take 2 tablets (666 mg total) by mouth 3 (three) times daily.  Qty: 180 tablet, Refills: 0      chlordiazepoxide (LIBRIUM) 25 MG Cap Take 1 capsule (25 mg total) by mouth 2 (two) times a day. Take one capsule the morning after discharge (3/31/2023) for 1 day  Qty: 1 capsule, Refills: 0      isosorbide mononitrate (IMDUR) 30 MG 24 hr tablet Take 1 tablet (30 mg total) by mouth every evening.  Qty: 90 tablet, Refills: 3      losartan (COZAAR) 100 MG tablet Take 1 tablet (100 mg total) by mouth once daily.  Qty: 90 tablet, Refills: 3    Comments: .           CONTINUE these medications which have CHANGED    Details   amLODIPine (NORVASC) 10 MG tablet Take 1 tablet (10 mg total) by mouth once daily.  Qty: 90 tablet, Refills: 3    Comments: .      thiamine 100 MG tablet Take 1 tablet (100 mg total) by mouth once daily.  Qty: 90 tablet, Refills: 3           CONTINUE these medications which have NOT CHANGED    Details   acetaminophen (TYLENOL) 500 MG tablet Take 500-1,500 mg by mouth daily as needed for Pain.      ARIPiprazole (ABILIFY) 5 MG Tab Take 5 mg by mouth once daily.      ATROVENT HFA 17 mcg/actuation inhaler Inhale 2 puffs into the lungs every 6 (six) hours as needed for Wheezing.  Qty: 12.9 g, Refills: 11      dicyclomine (BENTYL) 20 mg tablet Take 20 mg by mouth 4 (four) times daily.      divalproex (DEPAKOTE SPRINKLE) 125 mg capsule Take 250 mg by mouth 2 (two) times daily.      !! DULoxetine (CYMBALTA) 30 MG capsule Take 1 capsule (30 mg total) by mouth once daily. Take with 60mg =90mg QD  Qty: 90 capsule,  Refills: 0    Associated Diagnoses: Depression with anxiety      !! DULoxetine (CYMBALTA) 60 MG capsule TAKE 1 CAPSULE BY MOUTH EVERY DAY WITH LARGEST MEAL  Qty: 90 capsule, Refills: 0    Comments: Take with 30mg =90mg QD  Associated Diagnoses: Depression with anxiety      folic acid (FOLVITE) 1 MG tablet Take 1 tablet (1 mg total) by mouth once daily.  Qty: 30 tablet, Refills: 3      levothyroxine (SYNTHROID) 50 MCG tablet Take 1 tablet (50 mcg total) by mouth before breakfast.  Qty: 90 tablet, Refills: 3    Associated Diagnoses: Hypothyroidism, unspecified type      loperamide (IMODIUM) 2 mg capsule Take 2 mg by mouth 4 (four) times daily as needed for Diarrhea (Per package directions).      metFORMIN (GLUCOPHAGE-XR) 500 MG ER 24hr tablet Take 2 tablets (1,000 mg total) by mouth 2 (two) times daily with meals.  Qty: 360 tablet, Refills: 3    Associated Diagnoses: Uncontrolled type 2 diabetes mellitus with hyperglycemia      methocarbamoL (ROBAXIN) 750 MG Tab Take 750 mg by mouth 3 (three) times daily as needed (Pain).      multivitamin (THERAGRAN) per tablet Take 1 tablet by mouth once daily.      ondansetron (ZOFRAN ODT) 4 MG TbDL Take 1 tablet (4 mg total) by mouth every 6 (six) hours as needed (nausea).  Qty: 20 tablet, Refills: 0      pantoprazole (PROTONIX) 40 MG tablet Take 1 tablet (40 mg total) by mouth every morning.  Qty: 30 tablet, Refills: 0      propranoloL (INDERAL) 20 MG tablet Take 20 mg by mouth 2 (two) times daily.      traZODone (DESYREL) 300 MG tablet Take 1 tablet (300 mg total) by mouth every evening.  Qty: 30 tablet, Refills: 11      anastrozole (ARIMIDEX) 1 mg Tab Take 1 tablet (1 mg total) by mouth every morning.  Qty: 90 tablet, Refills: 3    Associated Diagnoses: Malignant neoplasm of central portion of right breast in female, estrogen receptor positive      gabapentin (NEURONTIN) 600 MG tablet Take 1 tablet (600 mg total) by mouth 2 (two) times daily.  Qty: 180 tablet, Refills: 3        !! - Potential duplicate medications found. Please discuss with provider.        STOP taking these medications       diazePAM (VALIUM) 10 MG Tab Comments:   Reason for Stopping:         nicotine (NICODERM CQ) 21 mg/24 hr Comments:   Reason for Stopping:                 I have seen and examined this patient within the last 30 days. My clinical findings that support the need for the home health skilled services and home bound status are the following:no   Weakness/numbness causing balance and gait disturbance due to alcohol use disorder making it taxing to leave home.  Mental confusion making it unsafe for patient to leave home alone due to  alcohol use disorder.     Diet:   regular diet    Labs:  Report Lab results to PCP.    Referrals/ Consults  Physical Therapy to evaluate and treat. Evaluate for home safety and equipment needs; Establish/upgrade home exercise program. Perform / instruct on therapeutic exercises, gait training, transfer training, and Range of Motion.  Occupational Therapy to evaluate and treat. Evaluate home environment for safety and equipment needs. Perform/Instruct on transfers, ADL training, ROM, and therapeutic exercises.  Aide to provide assistance with personal care, ADLs, and vital signs.    Activities:   activity as tolerated    Nursing:   Agency to admit patient within 24 hours of hospital discharge unless specified on physician order or at patient request    SN to complete comprehensive assessment including routine vital signs. Instruct on disease process and s/s of complications to report to MD. Review/verify medication list sent home with the patient at time of discharge  and instruct patient/caregiver as needed. Frequency may be adjusted depending on start of care date.     Skilled nurse to perform up to 3 visits PRN for symptoms related to diagnosis    Notify MD if SBP > 160 or < 90; DBP > 90 or < 50; HR > 120 or < 50; Temp > 101; O2 < 88%; Other:   acute clinical worsening    Ok to  schedule additional visits based on staff availability and patient request on consecutive days within the home health episode.    When multiple disciplines ordered:    Start of Care occurs on Sunday - Wednesday schedule remaining discipline evaluations as ordered on separate consecutive days following the start of care.    Thursday SOC -schedule subsequent evaluations Friday and Monday the following week.     Friday - Saturday SOC - schedule subsequent discipline evaluations on consecutive days starting Monday of the following week.    For all post-discharge communication and subsequent orders please contact patient's primary care physician.     Miscellaneous   Diabetic Care:   Report CBG < 60 or > 350 to physician.    Home Health Aide:  Physical Therapy Three times weekly, Occupational Therapy Three times weekly, and Home Health Aide Three times weekly    Wound Care Orders  no    I certify that this patient is confined to her home and needs physical therapy and occupational therapy.    Denilson Hurd M.D.

## 2023-03-30 NOTE — TELEPHONE ENCOUNTER
Called pt back to see if this call was from before we spoke a little while ago (had already spoken w/ her within the past hours and confirmed Hosp f/u with her in sep encounter and she declined a sooner appt with another provider)    LVM   Routing for two more attempts

## 2023-03-31 ENCOUNTER — PATIENT OUTREACH (OUTPATIENT)
Dept: ADMINISTRATIVE | Facility: CLINIC | Age: 63
End: 2023-03-31
Payer: COMMERCIAL

## 2023-03-31 LAB — POCT GLUCOSE: 203 MG/DL (ref 70–110)

## 2023-03-31 NOTE — PROGRESS NOTES
C3 nurse attempted to contact patient for a TCC post hospital discharge follow-up call. The patient  declined call at this time.

## 2023-03-31 NOTE — TELEPHONE ENCOUNTER
Spoke with pt and  to schedule Hospital; f/u. Override request sent to [employer] stating message below:    [11:12 AM] Tonja Walsh          please schedule MRN: [id number] w/ Dr. Rodriguez for hospital f/u on 4/6/2023 at 9:40am held with 2:40pm slot for 40min visit

## 2023-04-01 ENCOUNTER — HOSPITAL ENCOUNTER (OUTPATIENT)
Facility: HOSPITAL | Age: 63
Discharge: HOME | End: 2023-04-03
Attending: EMERGENCY MEDICINE | Admitting: STUDENT IN AN ORGANIZED HEALTH CARE EDUCATION/TRAINING PROGRAM
Payer: COMMERCIAL

## 2023-04-01 ENCOUNTER — NURSE TRIAGE (OUTPATIENT)
Dept: ADMINISTRATIVE | Facility: CLINIC | Age: 63
End: 2023-04-01
Payer: COMMERCIAL

## 2023-04-01 DIAGNOSIS — F10.20 ALCOHOL USE DISORDER, SEVERE, DEPENDENCE: Primary | Chronic | ICD-10-CM

## 2023-04-01 DIAGNOSIS — R07.9 CHEST PAIN: ICD-10-CM

## 2023-04-01 DIAGNOSIS — R53.83 FATIGUE: ICD-10-CM

## 2023-04-01 DIAGNOSIS — F41.8 DEPRESSION WITH ANXIETY: ICD-10-CM

## 2023-04-01 PROBLEM — J96.02 ACUTE HYPERCAPNIC RESPIRATORY FAILURE: Status: ACTIVE | Noted: 2023-04-01

## 2023-04-01 PROBLEM — R29.898 LEG WEAKNESS, BILATERAL: Status: ACTIVE | Noted: 2023-04-01

## 2023-04-01 LAB
ALBUMIN SERPL BCP-MCNC: 3.6 G/DL (ref 3.5–5.2)
ALLENS TEST: ABNORMAL
ALLENS TEST: NORMAL
ALP SERPL-CCNC: 53 U/L (ref 55–135)
ALT SERPL W/O P-5'-P-CCNC: 32 U/L (ref 10–44)
ANION GAP SERPL CALC-SCNC: 13 MMOL/L (ref 8–16)
AST SERPL-CCNC: 39 U/L (ref 10–40)
BASOPHILS # BLD AUTO: 0.02 K/UL (ref 0–0.2)
BASOPHILS NFR BLD: 0.3 % (ref 0–1.9)
BILIRUB SERPL-MCNC: 0.5 MG/DL (ref 0.1–1)
BILIRUB UR QL STRIP: NEGATIVE
BUN SERPL-MCNC: 7 MG/DL (ref 8–23)
CALCIUM SERPL-MCNC: 9.4 MG/DL (ref 8.7–10.5)
CHLORIDE SERPL-SCNC: 102 MMOL/L (ref 95–110)
CK SERPL-CCNC: 25 U/L (ref 20–180)
CLARITY UR REFRACT.AUTO: CLEAR
CO2 SERPL-SCNC: 24 MMOL/L (ref 23–29)
COLOR UR AUTO: YELLOW
CREAT SERPL-MCNC: 0.9 MG/DL (ref 0.5–1.4)
CTP QC/QA: YES
DIFFERENTIAL METHOD: ABNORMAL
EOSINOPHIL # BLD AUTO: 0 K/UL (ref 0–0.5)
EOSINOPHIL NFR BLD: 0.5 % (ref 0–8)
ERYTHROCYTE [DISTWIDTH] IN BLOOD BY AUTOMATED COUNT: 18.1 % (ref 11.5–14.5)
EST. GFR  (NO RACE VARIABLE): >60 ML/MIN/1.73 M^2
ETHANOL SERPL-MCNC: <10 MG/DL
GLUCOSE SERPL-MCNC: 90 MG/DL (ref 70–110)
GLUCOSE UR QL STRIP: NEGATIVE
HCO3 UR-SCNC: 29.5 MMOL/L (ref 24–28)
HCT VFR BLD AUTO: 39.1 % (ref 37–48.5)
HGB BLD-MCNC: 11.2 G/DL (ref 12–16)
HGB UR QL STRIP: NEGATIVE
IMM GRANULOCYTES # BLD AUTO: 0.01 K/UL (ref 0–0.04)
IMM GRANULOCYTES NFR BLD AUTO: 0.2 % (ref 0–0.5)
KETONES UR QL STRIP: NEGATIVE
LDH SERPL L TO P-CCNC: 0.52 MMOL/L (ref 0.5–2.2)
LEUKOCYTE ESTERASE UR QL STRIP: NEGATIVE
LYMPHOCYTES # BLD AUTO: 2.3 K/UL (ref 1–4.8)
LYMPHOCYTES NFR BLD: 36.2 % (ref 18–48)
MCH RBC QN AUTO: 27.5 PG (ref 27–31)
MCHC RBC AUTO-ENTMCNC: 28.6 G/DL (ref 32–36)
MCV RBC AUTO: 96 FL (ref 82–98)
MONOCYTES # BLD AUTO: 0.8 K/UL (ref 0.3–1)
MONOCYTES NFR BLD: 12.6 % (ref 4–15)
NEUTROPHILS # BLD AUTO: 3.2 K/UL (ref 1.8–7.7)
NEUTROPHILS NFR BLD: 50.2 % (ref 38–73)
NITRITE UR QL STRIP: NEGATIVE
NRBC BLD-RTO: 0 /100 WBC
PCO2 BLDA: 65.6 MMHG (ref 35–45)
PH SMN: 7.26 [PH] (ref 7.35–7.45)
PH UR STRIP: 5 [PH] (ref 5–8)
PLATELET # BLD AUTO: 71 K/UL (ref 150–450)
PLATELET BLD QL SMEAR: ABNORMAL
PMV BLD AUTO: 10.8 FL (ref 9.2–12.9)
PO2 BLDA: 25 MMHG (ref 40–60)
POC BE: 2 MMOL/L
POC SATURATED O2: 35 % (ref 95–100)
POC TCO2: 31 MMOL/L (ref 24–29)
POCT GLUCOSE: 89 MG/DL (ref 70–110)
POTASSIUM SERPL-SCNC: 4.2 MMOL/L (ref 3.5–5.1)
PROT SERPL-MCNC: 6.6 G/DL (ref 6–8.4)
PROT UR QL STRIP: NEGATIVE
RBC # BLD AUTO: 4.08 M/UL (ref 4–5.4)
SAMPLE: ABNORMAL
SAMPLE: NORMAL
SARS-COV-2 RDRP RESP QL NAA+PROBE: NEGATIVE
SITE: ABNORMAL
SITE: NORMAL
SMUDGE CELLS BLD QL SMEAR: PRESENT
SODIUM SERPL-SCNC: 139 MMOL/L (ref 136–145)
SP GR UR STRIP: 1.01 (ref 1–1.03)
URN SPEC COLLECT METH UR: NORMAL
WBC # BLD AUTO: 6.35 K/UL (ref 3.9–12.7)
WBC TOXIC VACUOLES BLD QL SMEAR: PRESENT

## 2023-04-01 PROCEDURE — 96361 HYDRATE IV INFUSION ADD-ON: CPT

## 2023-04-01 PROCEDURE — 96375 TX/PRO/DX INJ NEW DRUG ADDON: CPT

## 2023-04-01 PROCEDURE — 93010 EKG 12-LEAD: ICD-10-PCS | Mod: ,,, | Performed by: INTERNAL MEDICINE

## 2023-04-01 PROCEDURE — 94761 N-INVAS EAR/PLS OXIMETRY MLT: CPT

## 2023-04-01 PROCEDURE — 83605 ASSAY OF LACTIC ACID: CPT

## 2023-04-01 PROCEDURE — 85025 COMPLETE CBC W/AUTO DIFF WBC: CPT | Performed by: PHYSICIAN ASSISTANT

## 2023-04-01 PROCEDURE — 99285 PR EMERGENCY DEPT VISIT,LEVEL V: ICD-10-PCS | Mod: FS,CS,, | Performed by: EMERGENCY MEDICINE

## 2023-04-01 PROCEDURE — 93005 ELECTROCARDIOGRAM TRACING: CPT

## 2023-04-01 PROCEDURE — 82803 BLOOD GASES ANY COMBINATION: CPT

## 2023-04-01 PROCEDURE — 82800 BLOOD PH: CPT

## 2023-04-01 PROCEDURE — 82962 GLUCOSE BLOOD TEST: CPT

## 2023-04-01 PROCEDURE — 87040 BLOOD CULTURE FOR BACTERIA: CPT | Performed by: PHYSICIAN ASSISTANT

## 2023-04-01 PROCEDURE — 27000221 HC OXYGEN, UP TO 24 HOURS

## 2023-04-01 PROCEDURE — 82550 ASSAY OF CK (CPK): CPT | Performed by: PHYSICIAN ASSISTANT

## 2023-04-01 PROCEDURE — 99285 EMERGENCY DEPT VISIT HI MDM: CPT | Mod: 25

## 2023-04-01 PROCEDURE — 81003 URINALYSIS AUTO W/O SCOPE: CPT | Mod: 59 | Performed by: PHYSICIAN ASSISTANT

## 2023-04-01 PROCEDURE — G0378 HOSPITAL OBSERVATION PER HR: HCPCS

## 2023-04-01 PROCEDURE — 82077 ASSAY SPEC XCP UR&BREATH IA: CPT | Performed by: PHYSICIAN ASSISTANT

## 2023-04-01 PROCEDURE — 80053 COMPREHEN METABOLIC PANEL: CPT | Performed by: PHYSICIAN ASSISTANT

## 2023-04-01 PROCEDURE — 93010 ELECTROCARDIOGRAM REPORT: CPT | Mod: ,,, | Performed by: INTERNAL MEDICINE

## 2023-04-01 PROCEDURE — 99900035 HC TECH TIME PER 15 MIN (STAT)

## 2023-04-01 PROCEDURE — 63600175 PHARM REV CODE 636 W HCPCS: Performed by: PHYSICIAN ASSISTANT

## 2023-04-01 PROCEDURE — 99285 EMERGENCY DEPT VISIT HI MDM: CPT | Mod: FS,CS,, | Performed by: EMERGENCY MEDICINE

## 2023-04-01 RX ORDER — TRAZODONE HYDROCHLORIDE 100 MG/1
300 TABLET ORAL NIGHTLY PRN
Status: DISCONTINUED | OUTPATIENT
Start: 2023-04-01 | End: 2023-04-03 | Stop reason: HOSPADM

## 2023-04-01 RX ORDER — ONDANSETRON 2 MG/ML
4 INJECTION INTRAMUSCULAR; INTRAVENOUS
Status: COMPLETED | OUTPATIENT
Start: 2023-04-01 | End: 2023-04-01

## 2023-04-01 RX ORDER — ISOSORBIDE MONONITRATE 30 MG/1
30 TABLET, EXTENDED RELEASE ORAL NIGHTLY
Status: DISCONTINUED | OUTPATIENT
Start: 2023-04-01 | End: 2023-04-03 | Stop reason: HOSPADM

## 2023-04-01 RX ORDER — AMLODIPINE BESYLATE 10 MG/1
10 TABLET ORAL DAILY
Status: DISCONTINUED | OUTPATIENT
Start: 2023-04-02 | End: 2023-04-03 | Stop reason: HOSPADM

## 2023-04-01 RX ORDER — LEVOTHYROXINE SODIUM 50 UG/1
50 TABLET ORAL
Status: DISCONTINUED | OUTPATIENT
Start: 2023-04-02 | End: 2023-04-03 | Stop reason: HOSPADM

## 2023-04-01 RX ORDER — IBUPROFEN 200 MG
1 TABLET ORAL DAILY
Status: DISCONTINUED | OUTPATIENT
Start: 2023-04-02 | End: 2023-04-03 | Stop reason: HOSPADM

## 2023-04-01 RX ORDER — THIAMINE HCL 100 MG
100 TABLET ORAL DAILY
Status: DISCONTINUED | OUTPATIENT
Start: 2023-04-02 | End: 2023-04-02

## 2023-04-01 RX ORDER — DULOXETIN HYDROCHLORIDE 30 MG/1
30 CAPSULE, DELAYED RELEASE ORAL DAILY
Status: DISCONTINUED | OUTPATIENT
Start: 2023-04-02 | End: 2023-04-03 | Stop reason: HOSPADM

## 2023-04-01 RX ORDER — GLUCAGON 1 MG
1 KIT INJECTION
Status: DISCONTINUED | OUTPATIENT
Start: 2023-04-02 | End: 2023-04-03 | Stop reason: HOSPADM

## 2023-04-01 RX ORDER — INSULIN ASPART 100 [IU]/ML
0-5 INJECTION, SOLUTION INTRAVENOUS; SUBCUTANEOUS EVERY 6 HOURS PRN
Status: DISCONTINUED | OUTPATIENT
Start: 2023-04-02 | End: 2023-04-03 | Stop reason: HOSPADM

## 2023-04-01 RX ORDER — SODIUM CHLORIDE 0.9 % (FLUSH) 0.9 %
10 SYRINGE (ML) INJECTION EVERY 12 HOURS PRN
Status: DISCONTINUED | OUTPATIENT
Start: 2023-04-02 | End: 2023-04-03 | Stop reason: HOSPADM

## 2023-04-01 RX ORDER — DEXTROSE 40 %
30 GEL (GRAM) ORAL
Status: DISCONTINUED | OUTPATIENT
Start: 2023-04-02 | End: 2023-04-03 | Stop reason: HOSPADM

## 2023-04-01 RX ORDER — ARIPIPRAZOLE 5 MG/1
5 TABLET ORAL DAILY
Status: DISCONTINUED | OUTPATIENT
Start: 2023-04-02 | End: 2023-04-03 | Stop reason: HOSPADM

## 2023-04-01 RX ORDER — LOSARTAN POTASSIUM 50 MG/1
100 TABLET ORAL EVERY EVENING
Status: DISCONTINUED | OUTPATIENT
Start: 2023-04-02 | End: 2023-04-01

## 2023-04-01 RX ORDER — ENOXAPARIN SODIUM 100 MG/ML
40 INJECTION SUBCUTANEOUS EVERY 24 HOURS
Status: DISCONTINUED | OUTPATIENT
Start: 2023-04-02 | End: 2023-04-02

## 2023-04-01 RX ORDER — FOLIC ACID 1 MG/1
1 TABLET ORAL DAILY
Status: DISCONTINUED | OUTPATIENT
Start: 2023-04-02 | End: 2023-04-03 | Stop reason: HOSPADM

## 2023-04-01 RX ORDER — DEXTROSE 40 %
15 GEL (GRAM) ORAL
Status: DISCONTINUED | OUTPATIENT
Start: 2023-04-02 | End: 2023-04-03 | Stop reason: HOSPADM

## 2023-04-01 RX ORDER — NALOXONE HCL 0.4 MG/ML
0.02 VIAL (ML) INJECTION
Status: DISCONTINUED | OUTPATIENT
Start: 2023-04-02 | End: 2023-04-03 | Stop reason: HOSPADM

## 2023-04-01 RX ADMIN — ONDANSETRON 4 MG: 2 INJECTION INTRAMUSCULAR; INTRAVENOUS at 06:04

## 2023-04-01 RX ADMIN — SODIUM CHLORIDE, POTASSIUM CHLORIDE, SODIUM LACTATE AND CALCIUM CHLORIDE 1779 ML: 600; 310; 30; 20 INJECTION, SOLUTION INTRAVENOUS at 06:04

## 2023-04-01 NOTE — ED TRIAGE NOTES
61 y/o F presents to ER with c/c weakness. Pt was discharged s/p alcoholism on thursday, finished her last med on Friday, and has been more confused and weak since discharge. Family denies any alcohol or drug use. Pt denies c/p, SOB, N/V/D, fevers and chills.

## 2023-04-01 NOTE — ED PROVIDER NOTES
Encounter Date: 4/1/2023       History     Chief Complaint   Patient presents with    Weakness     Discharged from here last Thursday. States he weakness is getting worse last few days.      61 y/o F with history of alcohol abuse (recently admitted, discharged on 3/30 for alcohol withdrawal), HTN, hyperlipidemia, seizures, GERD, DM2, COPD presents to the ED via EMS c/o weakness.  Her  is present and provides the majority of the history.  She was discharged from the hospital Thursday evening - when they got home she was able to walk up the stairs unassisted. Starting yesterday morning, she has been very fatigued with generalized weakness. Her BP has been low so she has not taken any of her home BP medications. Today, her  went to a birthday party and was out of the house for 1.5 hours - when he returned home he found the patient on the ground next to the bed.  She reports she was trying to get something off of her nightstand when she fell out of bed. She was discharged with a prescription for campral - this is a new medication for her. She reports nausea without vomiting, SOB, fatigue, generalized weakness. Denies fever, vomiting, chest pain, cough. Denies alcohol use since discharge from hospital.     The history is provided by the patient and the spouse.   Review of patient's allergies indicates:   Allergen Reactions    Lortab [hydrocodone-acetaminophen] Itching    Promethazine Itching and Other (See Comments)     Past Medical History:   Diagnosis Date    Anemia     Anemia     Controlled type 2 diabetes mellitus without complication, without long-term current use of insulin 11/30/2021    COPD (chronic obstructive pulmonary disease)     Depression     Diverticulitis     Fatty liver     GERD (gastroesophageal reflux disease)     Hyperlipidemia     Hypertension     Pancreatitis     Peptic ulcer disease     Polysubstance abuse     Posterior reversible encephalopathy syndrome     Sarcoidosis of lung      Sarcoidosis of lung     over 30 yrs ago    Seizures     7/2017    Suicide attempt     Suicide ideation      Past Surgical History:   Procedure Laterality Date    APPENDECTOMY      BILATERAL MASTECTOMY Bilateral 10/29/2020    Procedure: MASTECTOMY, BILATERAL;  Surgeon: Baylee Kevin MD;  [facility];  Service: General;  Laterality: Bilateral;    BREAST REVISION SURGERY Bilateral 2/11/2021    Procedure: BREAST REVISION SURGERY;  Surgeon: Scottie Johnson MD;  Location: [facility] OR [address] [place] [address];  Service: Plastics;  Laterality: Bilateral;    COLONOSCOPY N/A 7/28/2017    Procedure: COLONOSCOPY;  Surgeon: Aaron Alvarado MD;  Location: [facility] ENDO;  Service: Endoscopy;  Laterality: N/A;    ESOPHAGOGASTRODUODENOSCOPY  10/7/2016, 11/6/2014    2016 - gastritis, duodenitis, 2014 erosive gastritis    ESOPHAGOGASTRODUODENOSCOPY N/A 2/11/2020    Procedure: ESOPHAGOGASTRODUODENOSCOPY (EGD);  Surgeon: Fawn Garrido MD;  Location: [facility];  Service: Endoscopy;  Laterality: N/A;    ESOPHAGOGASTRODUODENOSCOPY N/A 4/19/2021    Procedure: EGD (ESOPHAGOGASTRODUODENOSCOPY);  Surgeon: Paramjit Martino MD;  Location: [facility];  Service: Endoscopy;  Laterality: N/A;    FLEXIBLE SIGMOIDOSCOPY  11/06/2014    colitis    HYSTERECTOMY      IMPLANTATION OF PERMANENT SACRAL NERVE STIMULATOR N/A 7/12/2022    Procedure: INSERTION, NEUROSTIMULATOR, PERMANENT, SACRAL;  Surgeon: Juaquin Edwards MD;  Location: [address];  Service: Urology;  Laterality: N/A;  1hr    INJECTION FOR SENTINEL NODE IDENTIFICATION Right 10/29/2020    Procedure: INJECTION, FOR SENTINEL NODE IDENTIFICATION;  Surgeon: Baylee Kevin MD;  [facility];  Service: General;  Laterality: Right;    INJECTION OF JOINT Right 10/10/2019    Procedure: Injection, Joint RIGHT ILIOPSOAS BURSA/TENDON INJECTION AND RIGHT GLUTEAL TENDON INJECTION WITH STEROID AND LIDOCAINE;  Surgeon: Guillaume Rico MD;  Location: [facility] PAIN MGT;  Service: Pain  Management;  Laterality: Right;  NEEDS CONSENT    INSERTION OF BREAST TISSUE EXPANDER Bilateral 10/29/2020    Procedure: INSERTION, TISSUE EXPANDER, BREAST;  Surgeon: Scottie Johnson MD;  [facility];  Service: Plastics;  Laterality: Bilateral;  Right breast: 1082 g  Left breast: 1076 g    LIPOSUCTION Bilateral 2021    Procedure: LIPOSUCTION;  Surgeon: Scottie Johnson MD;  [facility];  Service: Plastics;  Laterality: Bilateral;    mediastenoscopy      REPLACEMENT OF IMPLANT OF BREAST Bilateral 2021    Procedure: REPLACEMENT, IMPLANT, BREAST;  Surgeon: Scottie Johnson MD;  [facility];  Service: Plastics;  Laterality: Bilateral;    SENTINEL LYMPH NODE BIOPSY Right 10/29/2020    Procedure: BIOPSY, LYMPH NODE, SENTINEL;  Surgeon: Baylee Kevin MD;  Location: [facility] OR [address] [place] [address];  Service: General;  Laterality: Right;    TONSILLECTOMY N/A     TUBAL LIGATION       Family History   Problem Relation Age of Onset    Heart attack Father     Diabetes Father     Hypertension Father     Diabetes Mother     Hypertension Mother     Breast cancer Maternal Aunt     Colon cancer Maternal Uncle     Breast cancer Daughter     Ovarian cancer Neg Hx     Cancer Neg Hx      Social History     Tobacco Use    Smoking status: Former     Packs/day: 0.50     Years: 30.00     Pack years: 15.00     Types: Vaping with nicotine, Cigarettes     Quit date: 2021     Years since quittin.1    Smokeless tobacco: Never   Substance Use Topics    Alcohol use: Yes     Comment: vodka daily (half a regular bottle)    Drug use: Yes     Types: Marijuana     Comment: gummies     Review of Systems   Constitutional:  Positive for fatigue. Negative for chills and fever.   HENT:  Negative for congestion.    Respiratory:  Positive for shortness of breath.    Cardiovascular:  Negative for chest pain.   Gastrointestinal:  Positive for nausea. Negative for abdominal pain, diarrhea and  vomiting.   Genitourinary:  Negative for dysuria.   Musculoskeletal:  Negative for back pain.   Skin:  Negative for rash.   Neurological:  Negative for headaches.   Psychiatric/Behavioral:  Positive for confusion.      Physical Exam     Initial Vitals [04/01/23 1643]   BP Pulse Resp Temp SpO2   (!) 121/94 78 18 98.6 °F (37 °C) (!) 94 %      MAP       --         Physical Exam    Nursing note and vitals reviewed.  Constitutional: She appears well-developed and well-nourished.   Eyes: EOM are normal. Pupils are equal, round, and reactive to light.   Cardiovascular:  Normal rate, regular rhythm and normal heart sounds.     Exam reveals no gallop and no friction rub.       No murmur heard.  Pulmonary/Chest: Breath sounds normal. She has no wheezes. She has no rhonchi. She has no rales.   Abdominal: Abdomen is soft. Bowel sounds are normal. There is no abdominal tenderness. There is no rebound and no guarding.   Musculoskeletal:         General: No edema.     Neurological: No sensory deficit.   Arouses to voice. Oriented to person, place, situation but not time. States it is 1924 and she is 61 years old. Generalized weakness to all extremities.   Skin: Skin is warm and dry.       ED Course   Procedures  Labs Reviewed   CBC W/ AUTO DIFFERENTIAL - Abnormal; Notable for the following components:       Result Value    Hemoglobin 11.2 (*)     MCHC 28.6 (*)     RDW 18.1 (*)     Platelets 71 (*)     Platelet Estimate Decreased (*)     All other components within normal limits   COMPREHENSIVE METABOLIC PANEL - Abnormal; Notable for the following components:    BUN 7 (*)     Alkaline Phosphatase 53 (*)     All other components within normal limits   ISTAT PROCEDURE - Abnormal; Notable for the following components:    POC PH 7.260 (*)     POC PCO2 65.6 (*)     POC PO2 25 (*)     POC HCO3 29.5 (*)     POC SATURATED O2 35 (*)     POC TCO2 31 (*)     All other components within normal limits   CULTURE, BLOOD   CULTURE, BLOOD    URINALYSIS, REFLEX TO URINE CULTURE    Narrative:     Specimen Source->Urine   CK   ALCOHOL,MEDICAL (ETHANOL)   POCT GLUCOSE   ISTAT LACTATE   POCT GLUCOSE MONITORING CONTINUOUS          Imaging Results              CT Head Without Contrast (In process)                      X-Ray Chest AP Portable (Final result)  Result time 04/01/23 18:07:40      Final result by Shon Boles MD (04/01/23 18:07:40)                   Impression:      1. No convincing acute cardiopulmonary process, no large focal consolidation.      Electronically signed by: Shon Boles MD  Date:    04/01/2023  Time:    18:07               Narrative:    EXAMINATION:  XR CHEST AP PORTABLE    CLINICAL HISTORY:  Sepsis;    TECHNIQUE:  Single frontal view of the chest was performed.    COMPARISON:  03/26/2023    FINDINGS:  The cardiomediastinal silhouette is not enlarged, stable noting magnification by technique..  There is no pleural effusion.  The trachea is midline.  The lungs are symmetrically expanded bilaterally with mildly coarse interstitial attenuation in a perihilar distribution, accentuated by overlying habitus..  No large focal consolidation seen.  There is no pneumothorax.  The osseous structures are remarkable for degenerative changes.  Focal change projects over the thorax..                                       Medications   lactated ringers bolus 1,779 mL (1,779 mLs Intravenous New Bag 4/1/23 1838)   ondansetron injection 4 mg (4 mg Intravenous Given 4/1/23 1838)     Medical Decision Making:   History:   Old Medical Records: I decided to obtain old medical records.  Old Records Summarized: records from clinic visits and records from previous admission(s).       <> Summary of Records: Admitted 3/26-3/30 for alcohol withdrawal. Given librium taper. Discharged with prescription for campral  Clinical Tests:   Lab Tests: Ordered and Reviewed  Radiological Study: Ordered and Reviewed  Medical Tests: Reviewed and Ordered  Sepsis  "Perfusion Assessment: "I attest a sepsis perfusion exam was performed within 6 hours of sepsis, severe sepsis, or septic shock presentation, following fluid resuscitation."     APC / Resident Notes:   61 y/o F with history of alcohol abuse (recently admitted, discharged on 3/30 for alcohol withdrawal), HTN, hyperlipidemia, seizures, GERD, DM2, COPD presents to the ED via EMS c/o weakness.  Hypotensive. Hypoxic on RA - placed on supplemental oxygen via NC.  Her  reports she does use oxygen at night when sleeping but does not require this during the day. Lying in bed with eyes closed, arouses to voice. She is oriented to person, place, situation but not time. States it is 1924 and she is 61.  She seems somewhat slowed and confused. Diffuse weakness. Abdomen soft, nontender, lungs clear. DDx includes but is not limited to dehydration, DEVANTE, pneumonia, ICH, electrolyte abnormality, intoxication, polypharmacy, UTI. Will get labs, CXR, CT head.     UA with no infection. Lactic acid normal. No leukocytosis. Chronic, improved anemia. CMP with no acute abnormality. CPK normal. Ethanol <10.    CXR with no acute abnormality.    She is more awake now, answering questions.  reports she is too weak and he is unable to care for her at home. Unclear etiology now - labs with no acute abnormality. Improving some with IVF. ?polypharmacy.     9:06 PM  Signed out to Dr Almeida pending CT head. Anticipate admission for generalized weakness/fatigue.      Attending Attestation:     Physician Attestation Statement for NP/PA:   I have conducted a face to face encounter with this patient in addition to the NP/PA, due to Medical Complexity    Other NP/PA Attestation Additions:    History of Present Illness: 62-year-old woman with numerous comorbidities as well as recent discharge from this facility for alcohol withdrawal presents for altered mental status and generalized weakness.                          Clinical Impression: "   Final diagnoses:  [R53.83] Fatigue               Nita Jones PA-C  04/01/23 4620

## 2023-04-01 NOTE — ED NOTES
Patient identifiers for Earl Abdul 62 y.o. female checked and correct.  Chief Complaint   Patient presents with    Weakness     Discharged from here last Thursday. States he weakness is getting worse last few days.      Past Medical History:   Diagnosis Date    Anemia     Anemia     Controlled type 2 diabetes mellitus without complication, without long-term current use of insulin 11/30/2021    COPD (chronic obstructive pulmonary disease)     Depression     Diverticulitis     Fatty liver     GERD (gastroesophageal reflux disease)     Hyperlipidemia     Hypertension     Pancreatitis     Peptic ulcer disease     Polysubstance abuse     Posterior reversible encephalopathy syndrome     Sarcoidosis of lung     Sarcoidosis of lung     over 30 yrs ago    Seizures     7/2017    Suicide attempt     Suicide ideation      Allergies reported:   Review of patient's allergies indicates:   Allergen Reactions    Lortab [hydrocodone-acetaminophen] Itching    Promethazine Itching and Other (See Comments)         LOC: Patient is awake, alert, and aware of environment with an appropriate affect. Patient is oriented x 3 (- time, baseline GCS 15) and speaking very slowly.  APPEARANCE: Patient resting comfortably and in no acute distress. Patient is clean and well groomed, patient's clothing is properly fastened. Pt appears very lethargic and difficult to keep attention in conversation.  HEENT: - JVD, + midline trach  SKIN: The skin is warm and dry. Patient has normal skin turgor and moist mucus membranes.   MUSKULOSKELETAL: Patient is moving all extremities well, no obvious deformities noted. Pulses intact. PMS x 4  RESPIRATORY: Airway is open and patent. Respirations are spontaneous and non-labored with normal effort and rate. = CBBS. Pt found on 2 lpm O2 via NC.  CARDIAC: Patient has a normal rate and rhythm. 81 on cardiac monitor. No peripheral edema noted. + 2 bilateral pedal and radial pulses, < 3 s cap refill.  ABDOMEN: No  distention noted. Soft and non-tender upon palpation.  NEUROLOGICAL: pupils 4 mm, PERRL. Facial expression is symmetrical. Hand grasps are equal bilaterally. Normal sensation in all extremities when touched with finger.

## 2023-04-01 NOTE — TELEPHONE ENCOUNTER
Spoke with family member of pt who reports pt was recently discharged from hospital Thursday. States pt has been getting weaker, and reports pt unable to stand. States he found pt on floor, and had to put pt in bed. Advised to call 911 now. Verbalized understanding    Reason for Disposition   Shock suspected (e.g., cold/pale/clammy skin, too weak to stand, low BP, rapid pulse)    Additional Information   Negative: SEVERE difficulty breathing (e.g., struggling for each breath, speaks in single words)    Protocols used: Weakness (Generalized) and Fatigue-A-AH

## 2023-04-02 LAB
ALBUMIN SERPL BCP-MCNC: 3 G/DL (ref 3.5–5.2)
ALLENS TEST: ABNORMAL
ALLENS TEST: ABNORMAL
ALP SERPL-CCNC: 44 U/L (ref 55–135)
ALT SERPL W/O P-5'-P-CCNC: 23 U/L (ref 10–44)
AMMONIA PLAS-SCNC: 59 UMOL/L (ref 10–50)
ANION GAP SERPL CALC-SCNC: 9 MMOL/L (ref 8–16)
AST SERPL-CCNC: 28 U/L (ref 10–40)
BASOPHILS # BLD AUTO: 0.02 K/UL (ref 0–0.2)
BASOPHILS NFR BLD: 0.4 % (ref 0–1.9)
BILIRUB SERPL-MCNC: 0.4 MG/DL (ref 0.1–1)
BUN SERPL-MCNC: 6 MG/DL (ref 8–23)
CALCIUM SERPL-MCNC: 8.5 MG/DL (ref 8.7–10.5)
CHLORIDE SERPL-SCNC: 108 MMOL/L (ref 95–110)
CO2 SERPL-SCNC: 22 MMOL/L (ref 23–29)
CREAT SERPL-MCNC: 0.7 MG/DL (ref 0.5–1.4)
DELSYS: ABNORMAL
DELSYS: ABNORMAL
DIFFERENTIAL METHOD: ABNORMAL
EOSINOPHIL # BLD AUTO: 0 K/UL (ref 0–0.5)
EOSINOPHIL NFR BLD: 0.8 % (ref 0–8)
EP: 5
EP: 6
ERYTHROCYTE [DISTWIDTH] IN BLOOD BY AUTOMATED COUNT: 18.1 % (ref 11.5–14.5)
ERYTHROCYTE [SEDIMENTATION RATE] IN BLOOD BY WESTERGREN METHOD: 12 MM/H
ERYTHROCYTE [SEDIMENTATION RATE] IN BLOOD BY WESTERGREN METHOD: 12 MM/H
EST. GFR  (NO RACE VARIABLE): >60 ML/MIN/1.73 M^2
FIO2: 30
FIO2: 35
GLUCOSE SERPL-MCNC: 72 MG/DL (ref 70–110)
HCO3 UR-SCNC: 32 MMOL/L (ref 24–28)
HCO3 UR-SCNC: 33.4 MMOL/L (ref 24–28)
HCT VFR BLD AUTO: 35.4 % (ref 37–48.5)
HCT VFR BLD CALC: 36 %PCV (ref 36–54)
HGB BLD-MCNC: 10.6 G/DL (ref 12–16)
IMM GRANULOCYTES # BLD AUTO: 0.04 K/UL (ref 0–0.04)
IMM GRANULOCYTES NFR BLD AUTO: 0.8 % (ref 0–0.5)
IP: 12
IP: 15
LYMPHOCYTES # BLD AUTO: 2.4 K/UL (ref 1–4.8)
LYMPHOCYTES NFR BLD: 46.7 % (ref 18–48)
MAGNESIUM SERPL-MCNC: 1.5 MG/DL (ref 1.6–2.6)
MCH RBC QN AUTO: 27.6 PG (ref 27–31)
MCHC RBC AUTO-ENTMCNC: 29.9 G/DL (ref 32–36)
MCV RBC AUTO: 92 FL (ref 82–98)
MIN VOL: 5.5
MIN VOL: 6.1
MODE: ABNORMAL
MODE: ABNORMAL
MONOCYTES # BLD AUTO: 0.7 K/UL (ref 0.3–1)
MONOCYTES NFR BLD: 14.3 % (ref 4–15)
NEUTROPHILS # BLD AUTO: 1.9 K/UL (ref 1.8–7.7)
NEUTROPHILS NFR BLD: 37 % (ref 38–73)
NRBC BLD-RTO: 0 /100 WBC
PCO2 BLDA: 56.5 MMHG (ref 35–45)
PCO2 BLDA: 63.2 MMHG (ref 35–45)
PH SMN: 7.31 [PH] (ref 7.35–7.45)
PH SMN: 7.38 [PH] (ref 7.35–7.45)
PHOSPHATE SERPL-MCNC: 2.9 MG/DL (ref 2.7–4.5)
PLATELET # BLD AUTO: 44 K/UL (ref 150–450)
PMV BLD AUTO: 12.3 FL (ref 9.2–12.9)
PO2 BLDA: 100 MMHG (ref 80–100)
PO2 BLDA: 107 MMHG (ref 80–100)
POC BE: 6 MMOL/L
POC BE: 8 MMOL/L
POC IONIZED CALCIUM: 1.26 MMOL/L (ref 1.06–1.42)
POC SATURATED O2: 97 % (ref 95–100)
POC SATURATED O2: 98 % (ref 95–100)
POC TCO2: 34 MMOL/L (ref 23–27)
POC TCO2: 35 MMOL/L (ref 23–27)
POCT GLUCOSE: 118 MG/DL (ref 70–110)
POCT GLUCOSE: 123 MG/DL (ref 70–110)
POCT GLUCOSE: 142 MG/DL (ref 70–110)
POCT GLUCOSE: 155 MG/DL (ref 70–110)
POTASSIUM BLD-SCNC: 3.5 MMOL/L (ref 3.5–5.1)
POTASSIUM SERPL-SCNC: 4 MMOL/L (ref 3.5–5.1)
PROT SERPL-MCNC: 5.5 G/DL (ref 6–8.4)
RBC # BLD AUTO: 3.84 M/UL (ref 4–5.4)
SAMPLE: ABNORMAL
SAMPLE: ABNORMAL
SITE: ABNORMAL
SITE: ABNORMAL
SODIUM BLD-SCNC: 139 MMOL/L (ref 136–145)
SODIUM SERPL-SCNC: 139 MMOL/L (ref 136–145)
SP02: 99
SPONT RATE: 14
VIT B12 SERPL-MCNC: 536 PG/ML (ref 210–950)
WBC # BLD AUTO: 5.1 K/UL (ref 3.9–12.7)

## 2023-04-02 PROCEDURE — 84100 ASSAY OF PHOSPHORUS: CPT

## 2023-04-02 PROCEDURE — 82140 ASSAY OF AMMONIA: CPT | Performed by: STUDENT IN AN ORGANIZED HEALTH CARE EDUCATION/TRAINING PROGRAM

## 2023-04-02 PROCEDURE — 82607 VITAMIN B-12: CPT

## 2023-04-02 PROCEDURE — 99900031 HC PATIENT EDUCATION (STAT)

## 2023-04-02 PROCEDURE — 96365 THER/PROPH/DIAG IV INF INIT: CPT

## 2023-04-02 PROCEDURE — 80053 COMPREHEN METABOLIC PANEL: CPT

## 2023-04-02 PROCEDURE — 27000221 HC OXYGEN, UP TO 24 HOURS

## 2023-04-02 PROCEDURE — 85025 COMPLETE CBC W/AUTO DIFF WBC: CPT

## 2023-04-02 PROCEDURE — 83735 ASSAY OF MAGNESIUM: CPT

## 2023-04-02 PROCEDURE — 36415 COLL VENOUS BLD VENIPUNCTURE: CPT | Performed by: STUDENT IN AN ORGANIZED HEALTH CARE EDUCATION/TRAINING PROGRAM

## 2023-04-02 PROCEDURE — 25000003 PHARM REV CODE 250

## 2023-04-02 PROCEDURE — G0378 HOSPITAL OBSERVATION PER HR: HCPCS

## 2023-04-02 PROCEDURE — 94660 CPAP INITIATION&MGMT: CPT

## 2023-04-02 PROCEDURE — 25000003 PHARM REV CODE 250: Performed by: STUDENT IN AN ORGANIZED HEALTH CARE EDUCATION/TRAINING PROGRAM

## 2023-04-02 PROCEDURE — 99223 PR INITIAL HOSPITAL CARE,LEVL III: ICD-10-PCS | Mod: ,,, | Performed by: STUDENT IN AN ORGANIZED HEALTH CARE EDUCATION/TRAINING PROGRAM

## 2023-04-02 PROCEDURE — 82803 BLOOD GASES ANY COMBINATION: CPT

## 2023-04-02 PROCEDURE — 96367 TX/PROPH/DG ADDL SEQ IV INF: CPT

## 2023-04-02 PROCEDURE — 96366 THER/PROPH/DIAG IV INF ADDON: CPT

## 2023-04-02 PROCEDURE — 63600175 PHARM REV CODE 636 W HCPCS: Performed by: STUDENT IN AN ORGANIZED HEALTH CARE EDUCATION/TRAINING PROGRAM

## 2023-04-02 PROCEDURE — 36415 COLL VENOUS BLD VENIPUNCTURE: CPT

## 2023-04-02 PROCEDURE — S4991 NICOTINE PATCH NONLEGEND: HCPCS

## 2023-04-02 PROCEDURE — 97116 GAIT TRAINING THERAPY: CPT

## 2023-04-02 PROCEDURE — 86022 PLATELET ANTIBODIES: CPT | Performed by: STUDENT IN AN ORGANIZED HEALTH CARE EDUCATION/TRAINING PROGRAM

## 2023-04-02 PROCEDURE — 36600 WITHDRAWAL OF ARTERIAL BLOOD: CPT

## 2023-04-02 PROCEDURE — 97162 PT EVAL MOD COMPLEX 30 MIN: CPT

## 2023-04-02 PROCEDURE — 27000190 HC CPAP FULL FACE MASK W/VALVE

## 2023-04-02 PROCEDURE — 94761 N-INVAS EAR/PLS OXIMETRY MLT: CPT

## 2023-04-02 PROCEDURE — 99223 1ST HOSP IP/OBS HIGH 75: CPT | Mod: ,,, | Performed by: STUDENT IN AN ORGANIZED HEALTH CARE EDUCATION/TRAINING PROGRAM

## 2023-04-02 PROCEDURE — 99900035 HC TECH TIME PER 15 MIN (STAT)

## 2023-04-02 RX ORDER — MAGNESIUM SULFATE HEPTAHYDRATE 40 MG/ML
2 INJECTION, SOLUTION INTRAVENOUS ONCE
Status: COMPLETED | OUTPATIENT
Start: 2023-04-02 | End: 2023-04-02

## 2023-04-02 RX ORDER — HYDROXYZINE HYDROCHLORIDE 25 MG/1
25 TABLET, FILM COATED ORAL 3 TIMES DAILY PRN
Status: DISCONTINUED | OUTPATIENT
Start: 2023-04-02 | End: 2023-04-02

## 2023-04-02 RX ADMIN — THIAMINE HYDROCHLORIDE 500 MG: 100 INJECTION, SOLUTION INTRAMUSCULAR; INTRAVENOUS at 10:04

## 2023-04-02 RX ADMIN — AMLODIPINE BESYLATE 10 MG: 10 TABLET ORAL at 08:04

## 2023-04-02 RX ADMIN — Medication 1 PATCH: at 08:04

## 2023-04-02 RX ADMIN — ARIPIPRAZOLE 5 MG: 5 TABLET ORAL at 08:04

## 2023-04-02 RX ADMIN — DULOXETINE 30 MG: 30 CAPSULE, DELAYED RELEASE ORAL at 08:04

## 2023-04-02 RX ADMIN — THIAMINE HCL TAB 100 MG 100 MG: 100 TAB at 08:04

## 2023-04-02 RX ADMIN — THERA TABS 1 TABLET: TAB at 08:04

## 2023-04-02 RX ADMIN — THIAMINE HYDROCHLORIDE 500 MG: 100 INJECTION, SOLUTION INTRAMUSCULAR; INTRAVENOUS at 04:04

## 2023-04-02 RX ADMIN — FOLIC ACID 1 MG: 1 TABLET ORAL at 08:04

## 2023-04-02 RX ADMIN — LEVOTHYROXINE SODIUM 50 MCG: 50 TABLET ORAL at 05:04

## 2023-04-02 RX ADMIN — ISOSORBIDE MONONITRATE 30 MG: 30 TABLET, EXTENDED RELEASE ORAL at 08:04

## 2023-04-02 RX ADMIN — MAGNESIUM SULFATE 2 G: 2 INJECTION INTRAVENOUS at 08:04

## 2023-04-02 NOTE — ASSESSMENT & PLAN NOTE
Patient with Hypercapnic Respiratory failure which is Acute on chronic.  she is not on home oxygen. Supplemental oxygen was provided and noted-        Signs/symptoms of respiratory failure include- increased work of breathing. Contributing diagnoses includes - COPD Labs and images were reviewed. Patient Has recent VBG which has been reviewed. Will treat underlying causes and adjust management of respiratory failure as follows-     Patiet currently on 2L NC found to have VBG with CO2 >60 and pH of 7.25   - Bipap QHS 30% 12/6   - VBG with morning labs   - ipratropium PRN

## 2023-04-02 NOTE — ED NOTES
Pt ALFIE with all belongings in place. Remains on 2L NC with no new complaints. Per tele room 69 NSR.

## 2023-04-02 NOTE — NURSING
REPORT REC., CARE ASSUMED, VSS, NAD,  REPORTS RECENT DC AND READMISSION DUE TO FALLS AT HOME, PT REQUESTING TO AMB IN ROOM TO RR, OFFERED PUREWYCK/BSC/DIAPER, PT REFUSED, AMB WITH  TO RR, NOTED WITH UNSTEADY GAIT, PT/OT CX ORDERS NOTED, AWAITING ASSESSMENT, TELE SITTER AT BS, SAFETY MEASURES INTACT, PT AND SPOUSE REMAINS NONCOMPLIANT WITH SAFETY MEASURES, WILL NOTIFY TEAM, CHARGE AWARE

## 2023-04-02 NOTE — PLAN OF CARE
[facility] - Int[facility] Care ([facility]  Initial Discharge Assessment       Primary Care Provider: Andrew Rodriguez MD    Admission Diagnosis: Fatigue [R53.83]  Chest pain [R07.9]    Admission Date: 4/1/2023  Expected Discharge Date: 4/3/2023    Discharge Barriers Identified: None    Payor: [employer] / Plan: [facility] ALL SAVERS / Product Type: Commercial /     Extended Emergency Contact Information  Primary Emergency Contact: Henrique Abdul  Address: [address] [place] [address] [address] [place] [address] [facility]  Home Phone: [phone]  Relation: Spouse  Secondary Emergency Contact: Carlos Suazo  Mobile Phone: [phone]  Relation: Son    Discharge Plan A: Home, Home with family  Discharge Plan B: Skilled Nursing Facility      [id number] - [place] - [address] AT [facility]  [address]  [place] [address]  Phone: [phone] Fax: [phone]    SW met with patient at bedside to complete discharge planning assessment.  Patient alert and oriented xs 4.  Patient verified all demographic information on facesheet is correct.  Patient verified PCP is Dr. Rodriguez.  Patient verified primary health insurance is [facility].  Patient with NO home health but has listed DME.  Patient with NO POA or Living Will.  Patient not on dialysis or medication coumadin.  Patient with no 30 day admission.  Patient with no financial issues at this time.  Patient family will provide transportation upon discharge from facility.  Patient independent with ADLs, live with spouse, drives self.      Initial Assessment (most recent)       Adult Discharge Assessment - 04/02/23 1353          Discharge Assessment    Assessment Type Discharge Planning Assessment     Confirmed/corrected address, phone number and insurance Yes     Confirmed Demographics Correct on Facesheet     Source of Information patient     Does patient/caregiver understand observation status Yes      Communicated BECCA with patient/caregiver Yes     People in Home spouse     Facility Arrived From: home     Do you expect to return to your current living situation? Yes     Do you have help at home or someone to help you manage your care at home? Yes     Who are your caregiver(s) and their phone number(s)? spouse     Prior to hospitilization cognitive status: Alert/Oriented     Current cognitive status: Alert/Oriented     Equipment Currently Used at Home CPAP     Readmission within 30 days? No     Patient currently being followed by outpatient case management? No     Do you currently have service(s) that help you manage your care at home? No     Do you take prescription medications? Yes     Do you have prescription coverage? Yes     Do you have any problems affording any of your prescribed medications? No     Is the patient taking medications as prescribed? yes     Who is going to help you get home at discharge? spouse     How do you get to doctors appointments? car, drives self     Are you on dialysis? No     Do you take coumadin? No     Discharge Plan A Home;Home with family     Discharge Plan B Skilled Nursing Facility     DME Needed Upon Discharge  none     Discharge Plan discussed with: Patient     Discharge Barriers Identified None

## 2023-04-02 NOTE — H&P
[facility] Emergency Dept  Hospital Medicine  History & Physical    Patient Name: Earl Abdul  MRN: [id number]  Patient Class: OP- Observation  Admission Date: 4/1/2023  Attending Physician: Tara Javier DO   Primary Care Provider: Andrew Rodriguez MD         Patient information was obtained from patient and ER records.     Subjective:     Principal Problem:Acute hypercapnic respiratory failure    Chief Complaint:   Chief Complaint   Patient presents with    Weakness     Discharged from here last Thursday. States he weakness is getting worse last few days.         HPI: Ms Abdul is a 62 year old female with significant PMHx of alcohol use disorder admitted multiple times for alcohol withdrawals, HTN, HLD, seizures, and COPD who presents back to the ED with her  2 days after discharge due to profound LE weakness. Per patient, and  interviewed by ED, she was able to walk up stairs when going home post discharge however she has expressed profound fatigue and weakness causeing her to be bedbound. This resulted in a fall when she attempted to grab an item off the nightstand. She denies chest pain, fever, or emesis. She further denies alcohol consumption post discharge. She does endorse fatigue, SOB and nausea. In the ED she was found to be hypercapnic and requiring 2L NC to maintain O2 sats.       Past Medical History:   Diagnosis Date    Anemia     Anemia     Controlled type 2 diabetes mellitus without complication, without long-term current use of insulin 11/30/2021    COPD (chronic obstructive pulmonary disease)     Depression     Diverticulitis     Fatty liver     GERD (gastroesophageal reflux disease)     Hyperlipidemia     Hypertension     Pancreatitis     Peptic ulcer disease     Polysubstance abuse     Posterior reversible encephalopathy syndrome     Sarcoidosis of lung     Sarcoidosis of lung     over 30 yrs ago    Seizures     7/2017    Suicide attempt     Suicide  ideation        Past Surgical History:   Procedure Laterality Date    APPENDECTOMY      BILATERAL MASTECTOMY Bilateral 10/29/2020    Procedure: MASTECTOMY, BILATERAL;  Surgeon: Baylee Kevin MD;  [facility];  Service: General;  Laterality: Bilateral;    BREAST REVISION SURGERY Bilateral 2/11/2021    Procedure: BREAST REVISION SURGERY;  Surgeon: Scottie Johnson MD;  Location: [address];  Service: Plastics;  Laterality: Bilateral;    COLONOSCOPY N/A 7/28/2017    Procedure: COLONOSCOPY;  Surgeon: Aaron Alvarado MD;  Location: [facility];  Service: Endoscopy;  Laterality: N/A;    ESOPHAGOGASTRODUODENOSCOPY  10/7/2016, 11/6/2014    2016 - gastritis, duodenitis, 2014 erosive gastritis    ESOPHAGOGASTRODUODENOSCOPY N/A 2/11/2020    Procedure: ESOPHAGOGASTRODUODENOSCOPY (EGD);  Surgeon: Fawn Garrido MD;  Location: [facility];  Service: Endoscopy;  Laterality: N/A;    ESOPHAGOGASTRODUODENOSCOPY N/A 4/19/2021    Procedure: EGD (ESOPHAGOGASTRODUODENOSCOPY);  Surgeon: Paramjit Martino MD;  Location: [facility];  Service: Endoscopy;  Laterality: N/A;    FLEXIBLE SIGMOIDOSCOPY  11/06/2014    colitis    HYSTERECTOMY      IMPLANTATION OF PERMANENT SACRAL NERVE STIMULATOR N/A 7/12/2022    Procedure: INSERTION, NEUROSTIMULATOR, PERMANENT, SACRAL;  Surgeon: Juaquin Edwards MD;  Location: [address];  Service: Urology;  Laterality: N/A;  1hr    INJECTION FOR SENTINEL NODE IDENTIFICATION Right 10/29/2020    Procedure: INJECTION, FOR SENTINEL NODE IDENTIFICATION;  Surgeon: Baylee Kevin MD;  Location: [facility] OR [address] [place] [address];  Service: General;  Laterality: Right;    INJECTION OF JOINT Right 10/10/2019    Procedure: Injection, Joint RIGHT ILIOPSOAS BURSA/TENDON INJECTION AND RIGHT GLUTEAL TENDON INJECTION WITH STEROID AND LIDOCAINE;  Surgeon: Guillaume Rico MD;  Location: [facility];  Service: Pain Management;  Laterality: Right;  NEEDS CONSENT    INSERTION OF BREAST TISSUE EXPANDER  Bilateral 10/29/2020    Procedure: INSERTION, TISSUE EXPANDER, BREAST;  Surgeon: Scottie Johnson MD;  Location: [facility] OR 2ND FLR;  Service: Plastics;  Laterality: Bilateral;  Right breast: 1082 g  Left breast: 1076 g    LIPOSUCTION Bilateral 2/11/2021    Procedure: LIPOSUCTION;  Surgeon: Scottie Johnson MD;  Location: [facility] OR 2ND FLR;  Service: Plastics;  Laterality: Bilateral;    mediastenoscopy      REPLACEMENT OF IMPLANT OF BREAST Bilateral 2/11/2021    Procedure: REPLACEMENT, IMPLANT, BREAST;  Surgeon: Scottie Johnson MD;  Location: [facility] OR 2ND FLR;  Service: Plastics;  Laterality: Bilateral;    SENTINEL LYMPH NODE BIOPSY Right 10/29/2020    Procedure: BIOPSY, LYMPH NODE, SENTINEL;  Surgeon: Baylee Kevin MD;  [facility];  Service: General;  Laterality: Right;    TONSILLECTOMY N/A 1970    TUBAL LIGATION         Review of patient's allergies indicates:   Allergen Reactions    Lortab [hydrocodone-acetaminophen] Itching    Promethazine Itching and Other (See Comments)       Current Facility-Administered Medications on File Prior to Encounter   Medication    albuterol sulfate nebulizer solution 2.5 mg     Current Outpatient Medications on File Prior to Encounter   Medication Sig    acamprosate (CAMPRAL) 333 mg tablet Take 2 tablets (666 mg total) by mouth 3 (three) times daily.    acetaminophen (TYLENOL) 500 MG tablet Take 500-1,500 mg by mouth daily as needed for Pain.    amLODIPine (NORVASC) 10 MG tablet Take 1 tablet (10 mg total) by mouth once daily.    anastrozole (ARIMIDEX) 1 mg Tab Take 1 tablet (1 mg total) by mouth every morning. (Patient not taking: Reported on 3/29/2023.)    ARIPiprazole (ABILIFY) 5 MG Tab Take 5 mg by mouth once daily.    ATROVENT HFA 17 mcg/actuation inhaler Inhale 2 puffs into the lungs every 6 (six) hours as needed for Wheezing.    chlordiazepoxide (LIBRIUM) 25 MG Cap Take one capsule the morning after discharge (3/31/2023) for 1  day    dicyclomine (BENTYL) 20 mg tablet Take 20 mg by mouth 4 (four) times daily.    divalproex (DEPAKOTE SPRINKLE) 125 mg capsule Take 250 mg by mouth 2 (two) times daily.    DULoxetine (CYMBALTA) 30 MG capsule Take 1 capsule (30 mg total) by mouth once daily. Take with 60mg =90mg QD    DULoxetine (CYMBALTA) 60 MG capsule TAKE 1 CAPSULE BY MOUTH EVERY DAY WITH LARGEST MEAL    folic acid (FOLVITE) 1 MG tablet Take 1 tablet (1 mg total) by mouth once daily.    gabapentin (NEURONTIN) 600 MG tablet Take 1 tablet (600 mg total) by mouth 2 (two) times daily.    isosorbide mononitrate (IMDUR) 30 MG 24 hr tablet Take 1 tablet (30 mg total) by mouth every evening.    levothyroxine (SYNTHROID) 50 MCG tablet Take 1 tablet (50 mcg total) by mouth before breakfast.    loperamide (IMODIUM) 2 mg capsule Take 2 mg by mouth 4 (four) times daily as needed for Diarrhea (Per package directions).    losartan (COZAAR) 100 MG tablet Take 1 tablet (100 mg total) by mouth once daily.    metFORMIN (GLUCOPHAGE-XR) 500 MG ER 24hr tablet Take 2 tablets (1,000 mg total) by mouth 2 (two) times daily with meals.    methocarbamoL (ROBAXIN) 750 MG Tab Take 750 mg by mouth 3 (three) times daily as needed (Pain).    multivitamin (THERAGRAN) per tablet Take 1 tablet by mouth once daily.    ondansetron (ZOFRAN ODT) 4 MG TbDL Take 1 tablet (4 mg total) by mouth every 6 (six) hours as needed (nausea).    pantoprazole (PROTONIX) 40 MG tablet Take 1 tablet (40 mg total) by mouth every morning.    propranoloL (INDERAL) 20 MG tablet Take 20 mg by mouth 2 (two) times daily.    thiamine 100 MG tablet Take 1 tablet (100 mg total) by mouth once daily.    traZODone (DESYREL) 300 MG tablet Take 1 tablet (300 mg total) by mouth every evening.     Family History       Problem Relation (Age of Onset)    Breast cancer Maternal Aunt, Daughter    Colon cancer Maternal Uncle    Diabetes Father, Mother    Heart attack Father    Hypertension Father,  Mother          Tobacco Use    Smoking status: Former     Packs/day: 0.50     Years: 30.00     Pack years: 15.00     Types: Vaping with nicotine, Cigarettes     Quit date: 2021     Years since quittin.1    Smokeless tobacco: Never   Substance and Sexual Activity    Alcohol use: Yes     Comment: vodka daily (half a regular bottle)    Drug use: Yes     Types: Marijuana     Comment: gummies    Sexual activity: Yes     Birth control/protection: Surgical     Review of Systems   Constitutional:  Positive for fatigue. Negative for chills, diaphoresis and fever.   HENT:  Negative for congestion and sore throat.    Respiratory:  Positive for shortness of breath. Negative for cough and wheezing.    Cardiovascular:  Negative for chest pain, palpitations and leg swelling.   Gastrointestinal:  Positive for nausea. Negative for abdominal distention, abdominal pain, diarrhea and vomiting.   Skin:  Negative for color change and pallor.   Neurological:  Positive for weakness. Negative for syncope.   Psychiatric/Behavioral:  Positive for confusion and decreased concentration. Negative for agitation and behavioral problems.    Objective:     Vital Signs (Most Recent):  Temp: 98.4 °F (36.9 °C) (23)  Pulse: 76 (23)  Resp: 16 (23)  BP: (!) 107/51 (23)  SpO2: 98 % (23)   Vital Signs (24h Range):  Temp:  [98 °F (36.7 °C)-99.7 °F (37.6 °C)] 98.4 °F (36.9 °C)  Pulse:  [75-81] 76  Resp:  [12-20] 16  SpO2:  [94 %-98 %] 98 %  BP: (101-121)/(51-94) 107/51     Weight: 85.7 kg (189 lb)  Body mass index is 30.51 kg/m².    Physical Exam  Constitutional:       General: She is in acute distress.      Appearance: She is overweight. She is ill-appearing. She is not toxic-appearing or diaphoretic.   HENT:      Head: Normocephalic and atraumatic.   Cardiovascular:      Rate and Rhythm: Normal rate and regular rhythm.      Heart sounds: No murmur heard.  Pulmonary:      Effort: Pulmonary  effort is normal.      Breath sounds: Decreased air movement present.   Abdominal:      General: Abdomen is flat. There is no distension.      Palpations: Abdomen is soft.      Tenderness: There is no abdominal tenderness.   Neurological:      Mental Status: She is alert and easily aroused. She is disoriented.      Motor: Weakness present.      Gait: Gait abnormal.      Comments: Oriented to person and place, not to time            Significant Labs: All pertinent labs within the past 24 hours have been reviewed.    Significant Imaging: I have reviewed all pertinent imaging results/findings within the past 24 hours.    Assessment/Plan:     * Acute hypercapnic respiratory failure  Patient with Hypercapnic Respiratory failure which is Acute on chronic.  she is not on home oxygen. Supplemental oxygen was provided and noted-        Signs/symptoms of respiratory failure include- increased work of breathing. Contributing diagnoses includes - COPD Labs and images were reviewed. Patient Has recent VBG which has been reviewed. Will treat underlying causes and adjust management of respiratory failure as follows-     Patiet currently on 2L NC found to have VBG with CO2 >60 and pH of 7.25   - Bipap QHS 30% 12/6   - ABG with morning labs   - ipratropium PRN     Type 2 diabetes mellitus with hyperglycemia  Last A1c 5.1. Sugars 90 at time of admission   - Low dose SSI   - Diabetic diet     Hypothyroidism  Continue home levothyroxine    Anemia  Daily CBC's       Low serum vitamin B12  Patient with new onset B/L LE weakness with well documented hx of alcohol use disorder.  - Check B12 with am labs  - Consider administering B12 if symptoms not improving     COPD (chronic obstructive pulmonary disease)  See Acute hypercapnic respiratory failure       Difficulty walking  See Low serum Vitamin B12       Depression with anxiety  -continue home aripiprazole, duloxetine      Essential hypertension  Continue home amlodipine       Alcohol use  disorder, severe, dependence  Well documented hx of alcohol withdrawal. Given post discharge history and patient interview, not currently suspicious of further withdrawal symptoms.   - Low threshold for beginning benzos  and CIWA checks       VTE Risk Mitigation (From admission, onward)         Ordered     enoxaparin injection 40 mg  Daily         04/01/23 2318     IP VTE HIGH RISK PATIENT  Once         04/01/23 2318     Place sequential compression device  Until discontinued         04/01/23 2318                     Johnathonjuan Avendaño DO  Department of Hospital Medicine  [facility]

## 2023-04-02 NOTE — ASSESSMENT & PLAN NOTE
Well documented hx of alcohol withdrawal. Given post discharge history and patient interview, not currently suspicious of further withdrawal symptoms.   - Low threshold for beginning benzos  And CIWA checks

## 2023-04-02 NOTE — PROVIDER PROGRESS NOTES - EMERGENCY DEPT.
Patient signed out pending CT head.     Imaging Results              CT Head Without Contrast (Final result)  Result time 04/01/23 21:38:25      Final result by Uche Montano MD (04/01/23 21:38:25)                   Impression:      1. No evidence of acute intracranial pathology.  Additional evaluation, as clinically warranted  2. Additional incidental findings, as above.    Electronically signed by resident: Angelina Bartlett  Date:    04/01/2023  Time:    21:21    Electronically signed by: Uche Montano MD  Date:    04/01/2023  Time:    21:38               Narrative:    EXAMINATION:  CT HEAD WITHOUT CONTRAST    CLINICAL HISTORY:  Mental status change, unknown cause;    TECHNIQUE:  Low dose axial CT images obtained throughout the head without the use of intravenous contrast.  Axial, sagittal and coronal reconstructions were performed.    COMPARISON:  CT head without contrast 03/26/2023.    FINDINGS:  Intracranial compartment:    Generalized cerebral volume loss with compensatory dilation of the ventricles and sulci.  No evidence of hydrocephalus.    Supratentorial white matter hypoattenuation, nonspecific and suggestive of chronic microvascular ischemic change.  Stable hypodense focus in the left basal ganglia, likely representing remote lacunar type infarct.  No parenchymal mass, hemorrhage, edema or major vascular distribution infarct.    No extra-axial blood or fluid collections.    Skull/extracranial contents (limited evaluation):    No fracture. Partial opacification of the right mastoid air cells.  Mild mucosal thickening within the paranasal sinuses.                                       X-Ray Chest AP Portable (Final result)  Result time 04/01/23 18:07:40      Final result by Shon Boles MD (04/01/23 18:07:40)                   Impression:      1. No convincing acute cardiopulmonary process, no large focal consolidation.      Electronically signed by: Shon Boles MD  Date:    04/01/2023  Time:    18:07                Narrative:    EXAMINATION:  XR CHEST AP PORTABLE    CLINICAL HISTORY:  Sepsis;    TECHNIQUE:  Single frontal view of the chest was performed.    COMPARISON:  03/26/2023    FINDINGS:  The cardiomediastinal silhouette is not enlarged, stable noting magnification by technique..  There is no pleural effusion.  The trachea is midline.  The lungs are symmetrically expanded bilaterally with mildly coarse interstitial attenuation in a perihilar distribution, accentuated by overlying habitus..  No large focal consolidation seen.  There is no pneumothorax.  The osseous structures are remarkable for degenerative changes.  Focal change projects over the thorax..                                     Plan for admission to hospital medicine for generalized weakness, inability to walk or care for herself at home, mild confusion, and hypercarbia.

## 2023-04-02 NOTE — ASSESSMENT & PLAN NOTE
Patient with new onset B/L LE weakness with well documented hx of alcohol use disorder.  - Check B12 with am labs  - Consider administering B12 if symptoms not improving

## 2023-04-02 NOTE — ED NOTES
Bed: [facility]  Expected date: 4/1/23  Expected time: 5:37 PM  Means of arrival:   Comments:

## 2023-04-02 NOTE — SUBJECTIVE & OBJECTIVE
Past Medical History:   Diagnosis Date    Anemia     Anemia     Controlled type 2 diabetes mellitus without complication, without long-term current use of insulin 11/30/2021    COPD (chronic obstructive pulmonary disease)     Depression     Diverticulitis     Fatty liver     GERD (gastroesophageal reflux disease)     Hyperlipidemia     Hypertension     Pancreatitis     Peptic ulcer disease     Polysubstance abuse     Posterior reversible encephalopathy syndrome     Sarcoidosis of lung     Sarcoidosis of lung     over 30 yrs ago    Seizures     7/2017    Suicide attempt     Suicide ideation        Past Surgical History:   Procedure Laterality Date    APPENDECTOMY      BILATERAL MASTECTOMY Bilateral 10/29/2020    Procedure: MASTECTOMY, BILATERAL;  Surgeon: Baylee Kevin MD;  Location: [address];  Service: General;  Laterality: Bilateral;    BREAST REVISION SURGERY Bilateral 2/11/2021    Procedure: BREAST REVISION SURGERY;  Surgeon: Scottie Johnson MD;  Location: [facility] OR [address] [place] [address];  Service: Plastics;  Laterality: Bilateral;    COLONOSCOPY N/A 7/28/2017    Procedure: COLONOSCOPY;  Surgeon: Aaron Alvarado MD;  Location: [facility];  Service: Endoscopy;  Laterality: N/A;    ESOPHAGOGASTRODUODENOSCOPY  10/7/2016, 11/6/2014    2016 - gastritis, duodenitis, 2014 erosive gastritis    ESOPHAGOGASTRODUODENOSCOPY N/A 2/11/2020    Procedure: ESOPHAGOGASTRODUODENOSCOPY (EGD);  Surgeon: Fawn Garrido MD;  Location: [facility];  Service: Endoscopy;  Laterality: N/A;    ESOPHAGOGASTRODUODENOSCOPY N/A 4/19/2021    Procedure: EGD (ESOPHAGOGASTRODUODENOSCOPY);  Surgeon: Paramjit Martino MD;  Location: [facility];  Service: Endoscopy;  Laterality: N/A;    FLEXIBLE SIGMOIDOSCOPY  11/06/2014    colitis    HYSTERECTOMY      IMPLANTATION OF PERMANENT SACRAL NERVE STIMULATOR N/A 7/12/2022    Procedure: INSERTION, NEUROSTIMULATOR, PERMANENT, SACRAL;  Surgeon: Juaquin Edwards MD;  Location: [facility] OR [address] [place] [address];  Service:  Urology;  Laterality: N/A;  1hr    INJECTION FOR SENTINEL NODE IDENTIFICATION Right 10/29/2020    Procedure: INJECTION, FOR SENTINEL NODE IDENTIFICATION;  Surgeon: Baylee Kevin MD;  Location: [facility] OR [address] [place] [address];  Service: General;  Laterality: Right;    INJECTION OF JOINT Right 10/10/2019    Procedure: Injection, Joint RIGHT ILIOPSOAS BURSA/TENDON INJECTION AND RIGHT GLUTEAL TENDON INJECTION WITH STEROID AND LIDOCAINE;  Surgeon: Guillaume Rico MD;  Location: [facility];  Service: Pain Management;  Laterality: Right;  NEEDS CONSENT    INSERTION OF BREAST TISSUE EXPANDER Bilateral 10/29/2020    Procedure: INSERTION, TISSUE EXPANDER, BREAST;  Surgeon: Scottie Johnson MD;  Location: [facility] OR [address] [place] [address];  Service: Plastics;  Laterality: Bilateral;  Right breast: 1082 g  Left breast: 1076 g    LIPOSUCTION Bilateral 2/11/2021    Procedure: LIPOSUCTION;  Surgeon: Scottie Johnson MD;  Location: [facility] OR [address] [place] [address];  Service: Plastics;  Laterality: Bilateral;    mediastenoscopy      REPLACEMENT OF IMPLANT OF BREAST Bilateral 2/11/2021    Procedure: REPLACEMENT, IMPLANT, BREAST;  Surgeon: Scottie Johnson MD;  Location: [address];  Service: Plastics;  Laterality: Bilateral;    SENTINEL LYMPH NODE BIOPSY Right 10/29/2020    Procedure: BIOPSY, LYMPH NODE, SENTINEL;  Surgeon: Baylee Kevin MD;  Location: [address];  Service: General;  Laterality: Right;    TONSILLECTOMY N/A 1970    TUBAL LIGATION         Review of patient's allergies indicates:   Allergen Reactions    Lortab [hydrocodone-acetaminophen] Itching    Promethazine Itching and Other (See Comments)       Current Facility-Administered Medications on File Prior to Encounter   Medication    albuterol sulfate nebulizer solution 2.5 mg     Current Outpatient Medications on File Prior to Encounter   Medication Sig    acamprosate (CAMPRAL) 333 mg tablet Take 2 tablets (666 mg total) by mouth 3 (three) times daily.    acetaminophen (TYLENOL) 500 MG  tablet Take 500-1,500 mg by mouth daily as needed for Pain.    amLODIPine (NORVASC) 10 MG tablet Take 1 tablet (10 mg total) by mouth once daily.    anastrozole (ARIMIDEX) 1 mg Tab Take 1 tablet (1 mg total) by mouth every morning. (Patient not taking: Reported on 3/29/2023.)    ARIPiprazole (ABILIFY) 5 MG Tab Take 5 mg by mouth once daily.    ATROVENT HFA 17 mcg/actuation inhaler Inhale 2 puffs into the lungs every 6 (six) hours as needed for Wheezing.    chlordiazepoxide (LIBRIUM) 25 MG Cap Take one capsule the morning after discharge (3/31/2023) for 1 day    dicyclomine (BENTYL) 20 mg tablet Take 20 mg by mouth 4 (four) times daily.    divalproex (DEPAKOTE SPRINKLE) 125 mg capsule Take 250 mg by mouth 2 (two) times daily.    DULoxetine (CYMBALTA) 30 MG capsule Take 1 capsule (30 mg total) by mouth once daily. Take with 60mg =90mg QD    DULoxetine (CYMBALTA) 60 MG capsule TAKE 1 CAPSULE BY MOUTH EVERY DAY WITH LARGEST MEAL    folic acid (FOLVITE) 1 MG tablet Take 1 tablet (1 mg total) by mouth once daily.    gabapentin (NEURONTIN) 600 MG tablet Take 1 tablet (600 mg total) by mouth 2 (two) times daily.    isosorbide mononitrate (IMDUR) 30 MG 24 hr tablet Take 1 tablet (30 mg total) by mouth every evening.    levothyroxine (SYNTHROID) 50 MCG tablet Take 1 tablet (50 mcg total) by mouth before breakfast.    loperamide (IMODIUM) 2 mg capsule Take 2 mg by mouth 4 (four) times daily as needed for Diarrhea (Per package directions).    losartan (COZAAR) 100 MG tablet Take 1 tablet (100 mg total) by mouth once daily.    metFORMIN (GLUCOPHAGE-XR) 500 MG ER 24hr tablet Take 2 tablets (1,000 mg total) by mouth 2 (two) times daily with meals.    methocarbamoL (ROBAXIN) 750 MG Tab Take 750 mg by mouth 3 (three) times daily as needed (Pain).    multivitamin (THERAGRAN) per tablet Take 1 tablet by mouth once daily.    ondansetron (ZOFRAN ODT) 4 MG TbDL Take 1 tablet (4 mg total) by mouth every 6 (six) hours as needed (nausea).     pantoprazole (PROTONIX) 40 MG tablet Take 1 tablet (40 mg total) by mouth every morning.    propranoloL (INDERAL) 20 MG tablet Take 20 mg by mouth 2 (two) times daily.    thiamine 100 MG tablet Take 1 tablet (100 mg total) by mouth once daily.    traZODone (DESYREL) 300 MG tablet Take 1 tablet (300 mg total) by mouth every evening.     Family History       Problem Relation (Age of Onset)    Breast cancer Maternal Aunt, Daughter    Colon cancer Maternal Uncle    Diabetes Father, Mother    Heart attack Father    Hypertension Father, Mother          Tobacco Use    Smoking status: Former     Packs/day: 0.50     Years: 30.00     Pack years: 15.00     Types: Vaping with nicotine, Cigarettes     Quit date: 2021     Years since quittin.1    Smokeless tobacco: Never   Substance and Sexual Activity    Alcohol use: Yes     Comment: vodka daily (half a regular bottle)    Drug use: Yes     Types: Marijuana     Comment: gummies    Sexual activity: Yes     Birth control/protection: Surgical     Review of Systems   Constitutional:  Positive for fatigue. Negative for chills, diaphoresis and fever.   HENT:  Negative for congestion and sore throat.    Respiratory:  Positive for shortness of breath. Negative for cough and wheezing.    Cardiovascular:  Negative for chest pain, palpitations and leg swelling.   Gastrointestinal:  Positive for nausea. Negative for abdominal distention, abdominal pain, diarrhea and vomiting.   Skin:  Negative for color change and pallor.   Neurological:  Positive for weakness. Negative for syncope.   Psychiatric/Behavioral:  Positive for confusion and decreased concentration. Negative for agitation and behavioral problems.    Objective:     Vital Signs (Most Recent):  Temp: 98.4 °F (36.9 °C) (23)  Pulse: 76 (23)  Resp: 16 (23)  BP: (!) 107/51 (23)  SpO2: 98 % (23)   Vital Signs (24h Range):  Temp:  [98 °F (36.7 °C)-99.7 °F (37.6 °C)] 98.4 °F  (36.9 °C)  Pulse:  [75-81] 76  Resp:  [12-20] 16  SpO2:  [94 %-98 %] 98 %  BP: (101-121)/(51-94) 107/51     Weight: 85.7 kg (189 lb)  Body mass index is 30.51 kg/m².    Physical Exam  Constitutional:       General: She is in acute distress.      Appearance: She is overweight. She is ill-appearing. She is not toxic-appearing or diaphoretic.   HENT:      Head: Normocephalic and atraumatic.   Cardiovascular:      Rate and Rhythm: Normal rate and regular rhythm.      Heart sounds: No murmur heard.  Pulmonary:      Effort: Pulmonary effort is normal.      Breath sounds: Decreased air movement present.   Abdominal:      General: Abdomen is flat. There is no distension.      Palpations: Abdomen is soft.      Tenderness: There is no abdominal tenderness.   Neurological:      Mental Status: She is alert and easily aroused. She is disoriented.      Motor: Weakness present.      Gait: Gait abnormal.      Comments: Oriented to person and place, not to time            Significant Labs: All pertinent labs within the past 24 hours have been reviewed.    Significant Imaging: I have reviewed all pertinent imaging results/findings within the past 24 hours.

## 2023-04-02 NOTE — ED NOTES
Received report from Morteza RN, all questions answered, care resumed. Pt at this time somnolent but arouses to voice. Oriented x4 but lethargic. Remains on 2LNC with no new complaints at this time. VSS, in NAD. Awaiting admission orders.

## 2023-04-02 NOTE — HPI
Ms Abdul is a 62 year old female with significant PMHx of alcohol use disorder admitted multiple times for alcohol withdrawals, HTN, HLD, seizures, and COPD who presents back to the ED with her  2 days after discharge due to profound LE weakness. Per patient, and  interviewed by ED, she was able to walk up stairs when ggoing home post discharge however she has expressed profound fatigue and weakness causeing her to be bedbound. This resulted in a fall when she attempted to grab an item off the nightstand. She denies chest pain, fever, or emesis. She further denies alcohol consumption post discharge. She does endorse fatigue, SOB and nausea. In the ED she was found to be hypercapnic and requiring 2L NC to maintain O2 sats.

## 2023-04-02 NOTE — PLAN OF CARE
Problem: Physical Therapy  Goal: Physical Therapy Goal  Description: Goals to be met by: 2023     Patient will increase functional independence with mobility by performin. Supine to sit with [place]  2. Sit to supine with [place]  3. Sit to stand transfer with [place]  4. Bed to chair transfer with [place]   5. Gait  x 150 feet with Supervision using Rolling Walker.   6. Ascend/descend 10 steps with one handrail and supervision      Outcome: Ongoing, Progressing     PT evaluated patient and established goals for patient today.

## 2023-04-02 NOTE — PT/OT/SLP EVAL
Physical Therapy Evaluation & Treatment    Patient Name:  Earl Abdul   MRN:  [id number]  Admit Date: 4/1/2023  Admitting Diagnosis:  Acute hypercapnic respiratory failure  Recent Surgery: * No surgery found *    Length of Stay: 0 days    Recommendations:     Discharge Recommendations:  other (see comments)   Discharge Equipment Recommendations: walker, rolling   Barriers to discharge: Inaccessible home, decreased caregiver support, and decline in functional mobility     Assessment:     Earl Abdul is a 62 y.o. female admitted to [facility] on 4/1/2023 with a medical diagnosis of Acute hypercapnic respiratory failure. Today, she performed bed mobility and transfers with close supervision. She ambulated 75 ft without assistive device and contact guard assistance. She ambulated 35 ft with rolling walker and stand-by assistance.  She presents with the following impairments/functional limitations: weakness, impaired self care skills, impaired balance, impaired endurance, impaired functional mobility, pain, gait instability, decreased lower extremity function, impaired cardiopulmonary response to activity. She is a high fall risk due to history of falls and impaired dynamic standing balance. She will benefit from close supervision to minimal assistance with all out of bed mobility. Patient will benefit from skilled acute physical therapy services to address the mentioned deficits in order to increase safety and independence with functional mobility. Patient's  has to work during the day and is unable to provide supervision. He reports that he has called and is waiting to hear back from [facility]. PT, patient, and  discussed patient staying on the first floor of the house to avoid ascending/descending 10 steps frequently. PT encouraged ambulation with RW to decrease fall risk.     Rehab Prognosis: Good     Plan:     During this hospitalization, patient to be seen 4 x/week to address the identified  "rehab impairments via gait training, therapeutic activities, therapeutic exercises, neuromuscular re-education and progress towards the established goals.    Plan of Care Expires:  05/02/23    Social History   Living Environment:  Pt lives with    Pt lives in a three story home with 4 steps to enter home with railing. 10 steps with rail to bedroom and 3 steps to bed.    Pt has a walk-in shower and tub    Prior Level of Function:   independent with mobility and ADLs   DME used: shower chair  Patient reports 5-6 falls in the past 3 months. Patient contributes all but one of her falls to drinking       Roles/Repsonsibilities:   Works: no.   Drives: no.   Managing Medicines/Managing Home: no.     Upon discharge, patient will have assistance from:     Subjective   Communicated with RN prior to session.  Patient found HOB elevated with  telemetry, pulse ox (continuous) upon PT entry to room.  Patient's  present during session.  Pt is agreeable to evaluation.     Additional staff present:No       Pt's subjective: "Our house has a lot of different levels. I don't think a walker would be good."    Pain/Comfort:  Pain Rating 1:  ("a little bit")  Location 1: back  Pain Addressed 1: Reposition  Pain Rating Post-Intervention 1:  ("a little")      Objective:   General Precautions: Standard, fall   Orthopedic Precautions:N/A   Braces: N/A   Oxygen Device: Room Air  Vitals: BP (!) 120/58   Pulse 75   Temp 98 °F (36.7 °C)   Resp 18   Ht 5' 6" (1.676 m)   Wt 85.7 kg (189 lb)   SpO2 100%   BMI 30.51 kg/m²     Exams:  Cognition:   Alert and Cooperative  Command following: Follows multistep  commands  Fluency: clear/fluent  Skin Integrity: Visible skin intact  Range of Motion:  RLE: WFL  LLE: WFL  Strength Exam:  RLE Strength: grossly 4/5   LLE Strength: grossly 4/5     Outcome Measures:  AM-PAC 6 CLICK MOBILITY:   Turning over in bed (including adjusting bedclothes, sheets and blankets)?: 3  Sitting down on " and standing up from a chair with arms (e.g., wheelchair, bedside commode, etc.): 3  Moving from lying on back to sitting on the side of the bed?: 3  Moving to and from a bed to a chair (including a wheelchair)?: 3  Need to walk in hospital room?: 3  Climbing 3-5 steps with a railing?: 2  Basic Mobility Total Score: 17     Functional Mobility:    Bed Mobility:    Supine to Sit with HOB elevated: supervision  Sit to Supine: supervision    Transfers:    Sit to Stand:  stand by assistance   Stand to Sit: stand by assistance   Bed to Chair: contact guard assistance  using Step Transfer.   Chair to Bed:  contact guard assistance  using Step Transfer.    Gait:   Patient ambulates 75 ft   Patient uses:  no assistive device   Patient requires: contact guard  Gait Deviation(s): unsteady gait, decreased step length, wide base of support, and decreased johan  Impairments due to: impaired balance, decreased strength, and decreased endurance    Gait Training:  PT encourages the use of rolling walker for steady gait.   Patient ambulates 35 ft with rolling walker and close supervision.   Patient is shaky when ambulating. She reported mild nausea and dizziness. PT assisted patient to bed.  Patient's SpO2 > 92% on RA.       Patient Education:  Patient educated on PT POC and role of PT in acute care.  Patient educated on the importance of early mobility to prevent functional decline during hospital stay  Patient was instructed to utilize staff assistance for mobility/transfers.  Patient is appropriate to ambulate with contact guard assistance with RN/PCT  White board updated to include patient's safest level of mobility with staff assistance  Patient and Spouse educated on assistive device use, Fall risk, gait training, home safety, plan of care, and transfer training by explanation.  Patient was receptive to education and needs further education.       Patient left HOB elevated with all lines intact, call button in MD luan  notified, and patient's  present.      GOALS:   Multidisciplinary Problems       Physical Therapy Goals          Problem: Physical Therapy    Goal Priority Disciplines Outcome Goal Variances Interventions   Physical Therapy Goal     PT, PT/OT Ongoing, Progressing     Description: Goals to be met by: 2023     Patient will increase functional independence with mobility by performin. Supine to sit with [place]  2. Sit to supine with [place]  3. Sit to stand transfer with [place]  4. Bed to chair transfer with [place]   5. Gait  x 150 feet with Supervision using Rolling Walker.   610. Ascend/descend 10 steps with one handrail and supervision                           History:     Past Medical History:   Diagnosis Date    Anemia     Anemia     Controlled type 2 diabetes mellitus without complication, without long-term current use of insulin 2021    COPD (chronic obstructive pulmonary disease)     Depression     Diverticulitis     Fatty liver     GERD (gastroesophageal reflux disease)     Hyperlipidemia     Hypertension     Pancreatitis     Peptic ulcer disease     Polysubstance abuse     Posterior reversible encephalopathy syndrome     Sarcoidosis of lung     Sarcoidosis of lung     over 30 yrs ago    Seizures     2017    Suicide attempt     Suicide ideation        Past Surgical History:   Procedure Laterality Date    APPENDECTOMY      BILATERAL MASTECTOMY Bilateral 10/29/2020    Procedure: MASTECTOMY, BILATERAL;  Surgeon: Baylee Kevin MD;  Location: [address];  Service: General;  Laterality: Bilateral;    BREAST REVISION SURGERY Bilateral 2021    Procedure: BREAST REVISION SURGERY;  Surgeon: Scottie Johnson MD;  Location: [address];  Service: Plastics;  Laterality: Bilateral;    COLONOSCOPY N/A 2017    Procedure: COLONOSCOPY;  Surgeon: Aaron Alvarado MD;  Location: [facility];  Service: Endoscopy;  Laterality: N/A;    ESOPHAGOGASTRODUODENOSCOPY   10/7/2016, 11/6/2014    2016 - gastritis, duodenitis, 2014 erosive gastritis    ESOPHAGOGASTRODUODENOSCOPY N/A 2/11/2020    Procedure: ESOPHAGOGASTRODUODENOSCOPY (EGD);  Surgeon: Fawn Garrido MD;  Location: [facility];  Service: Endoscopy;  Laterality: N/A;    ESOPHAGOGASTRODUODENOSCOPY N/A 4/19/2021    Procedure: EGD (ESOPHAGOGASTRODUODENOSCOPY);  Surgeon: Paramjit Martino MD;  Location: [facility];  Service: Endoscopy;  Laterality: N/A;    FLEXIBLE SIGMOIDOSCOPY  11/06/2014    colitis    HYSTERECTOMY      IMPLANTATION OF PERMANENT SACRAL NERVE STIMULATOR N/A 7/12/2022    Procedure: INSERTION, NEUROSTIMULATOR, PERMANENT, SACRAL;  Surgeon: Juaquin Edwards MD;  Location: [facility] OR [address] [place] [address];  Service: Urology;  Laterality: N/A;  1hr    INJECTION FOR SENTINEL NODE IDENTIFICATION Right 10/29/2020    Procedure: INJECTION, FOR SENTINEL NODE IDENTIFICATION;  Surgeon: Baylee Kevin MD;  Location: [address];  Service: General;  Laterality: Right;    INJECTION OF JOINT Right 10/10/2019    Procedure: Injection, Joint RIGHT ILIOPSOAS BURSA/TENDON INJECTION AND RIGHT GLUTEAL TENDON INJECTION WITH STEROID AND LIDOCAINE;  Surgeon: Guillaume Rico MD;  Location: [facility];  Service: Pain Management;  Laterality: Right;  NEEDS CONSENT    INSERTION OF BREAST TISSUE EXPANDER Bilateral 10/29/2020    Procedure: INSERTION, TISSUE EXPANDER, BREAST;  Surgeon: Scottie Johnson MD;  Location: [address];  Service: Plastics;  Laterality: Bilateral;  Right breast: 1082 g  Left breast: 1076 g    LIPOSUCTION Bilateral 2/11/2021    Procedure: LIPOSUCTION;  Surgeon: Scottie Johnson MD;  Location: [facility] OR [address] [place] [address];  Service: Plastics;  Laterality: Bilateral;    mediastenoscopy      REPLACEMENT OF IMPLANT OF BREAST Bilateral 2/11/2021    Procedure: REPLACEMENT, IMPLANT, BREAST;  Surgeon: Scottie Johnson MD;  Location: [facility] OR [address] [place] [address];  Service: Plastics;  Laterality: Bilateral;    SENTINEL LYMPH NODE  BIOPSY Right 10/29/2020    Procedure: BIOPSY, LYMPH NODE, SENTINEL;  Surgeon: Baylee Kevin MD;  Location: [facility] OR [address] [place] [address];  Service: General;  Laterality: Right;    TONSILLECTOMY N/A 1970    TUBAL LIGATION         Time Tracking:     PT Received On: 04/02/23  PT Start Time: 1419     PT Stop Time: 1451  PT Total Time (min): 32 min     Billable Minutes: Evaluation 1 eval and Gait Training 10 mins         no

## 2023-04-02 NOTE — NURSING
Reported  provides most of medical history. Call to see if it was an appropriate time to gather information to complete admission, however  (Henrique PEÑA) says he will be here on tomorrow to provide information. Will endorse to morning Nurse.

## 2023-04-03 VITALS
TEMPERATURE: 98 F | RESPIRATION RATE: 18 BRPM | HEIGHT: 66 IN | HEART RATE: 77 BPM | OXYGEN SATURATION: 91 % | DIASTOLIC BLOOD PRESSURE: 75 MMHG | WEIGHT: 191.56 LBS | SYSTOLIC BLOOD PRESSURE: 144 MMHG | BODY MASS INDEX: 30.79 KG/M2

## 2023-04-03 LAB
ALBUMIN SERPL BCP-MCNC: 2.9 G/DL (ref 3.5–5.2)
ALP SERPL-CCNC: 45 U/L (ref 55–135)
ALT SERPL W/O P-5'-P-CCNC: 20 U/L (ref 10–44)
ANION GAP SERPL CALC-SCNC: 7 MMOL/L (ref 8–16)
ANISOCYTOSIS BLD QL SMEAR: SLIGHT
AST SERPL-CCNC: 20 U/L (ref 10–40)
BASOPHILS # BLD AUTO: ABNORMAL K/UL (ref 0–0.2)
BASOPHILS NFR BLD: 0 % (ref 0–1.9)
BILIRUB SERPL-MCNC: 0.3 MG/DL (ref 0.1–1)
BUN SERPL-MCNC: 8 MG/DL (ref 8–23)
CALCIUM SERPL-MCNC: 8.3 MG/DL (ref 8.7–10.5)
CHLORIDE SERPL-SCNC: 102 MMOL/L (ref 95–110)
CO2 SERPL-SCNC: 30 MMOL/L (ref 23–29)
CREAT SERPL-MCNC: 0.7 MG/DL (ref 0.5–1.4)
DIFFERENTIAL METHOD: ABNORMAL
DOHLE BOD BLD QL SMEAR: PRESENT
EOSINOPHIL # BLD AUTO: ABNORMAL K/UL (ref 0–0.5)
EOSINOPHIL NFR BLD: 2 % (ref 0–8)
ERYTHROCYTE [DISTWIDTH] IN BLOOD BY AUTOMATED COUNT: 17.2 % (ref 11.5–14.5)
EST. GFR  (NO RACE VARIABLE): >60 ML/MIN/1.73 M^2
GLUCOSE SERPL-MCNC: 116 MG/DL (ref 70–110)
HCT VFR BLD AUTO: 30.8 % (ref 37–48.5)
HGB BLD-MCNC: 9.1 G/DL (ref 12–16)
HYPOCHROMIA BLD QL SMEAR: ABNORMAL
IMM GRANULOCYTES # BLD AUTO: ABNORMAL K/UL (ref 0–0.04)
IMM GRANULOCYTES NFR BLD AUTO: ABNORMAL % (ref 0–0.5)
LYMPHOCYTES # BLD AUTO: ABNORMAL K/UL (ref 1–4.8)
LYMPHOCYTES NFR BLD: 36 % (ref 18–48)
MAGNESIUM SERPL-MCNC: 1.9 MG/DL (ref 1.6–2.6)
MCH RBC QN AUTO: 27.5 PG (ref 27–31)
MCHC RBC AUTO-ENTMCNC: 29.5 G/DL (ref 32–36)
MCV RBC AUTO: 93 FL (ref 82–98)
METAMYELOCYTES NFR BLD MANUAL: 1 %
MONOCYTES # BLD AUTO: ABNORMAL K/UL (ref 0.3–1)
MONOCYTES NFR BLD: 12 % (ref 4–15)
NEUTROPHILS NFR BLD: 45 % (ref 38–73)
NEUTS BAND NFR BLD MANUAL: 4 %
NRBC BLD-RTO: 0 /100 WBC
OVALOCYTES BLD QL SMEAR: ABNORMAL
PF4 HEPARIN CMPLX AB SER QL: 0.12 OD (ref 0–0.4)
PHOSPHATE SERPL-MCNC: 3.3 MG/DL (ref 2.7–4.5)
PLATELET # BLD AUTO: 82 K/UL (ref 150–450)
PLATELET BLD QL SMEAR: ABNORMAL
PMV BLD AUTO: 11.4 FL (ref 9.2–12.9)
POCT GLUCOSE: 122 MG/DL (ref 70–110)
POIKILOCYTOSIS BLD QL SMEAR: SLIGHT
POLYCHROMASIA BLD QL SMEAR: ABNORMAL
POTASSIUM SERPL-SCNC: 3.6 MMOL/L (ref 3.5–5.1)
PROT SERPL-MCNC: 5.3 G/DL (ref 6–8.4)
RBC # BLD AUTO: 3.31 M/UL (ref 4–5.4)
SODIUM SERPL-SCNC: 139 MMOL/L (ref 136–145)
SPHEROCYTES BLD QL SMEAR: ABNORMAL
TARGETS BLD QL SMEAR: ABNORMAL
TOXIC GRANULES BLD QL SMEAR: PRESENT
WBC # BLD AUTO: 3.64 K/UL (ref 3.9–12.7)

## 2023-04-03 PROCEDURE — G0378 HOSPITAL OBSERVATION PER HR: HCPCS

## 2023-04-03 PROCEDURE — S4991 NICOTINE PATCH NONLEGEND: HCPCS

## 2023-04-03 PROCEDURE — 83735 ASSAY OF MAGNESIUM: CPT

## 2023-04-03 PROCEDURE — 85027 COMPLETE CBC AUTOMATED: CPT

## 2023-04-03 PROCEDURE — 63600175 PHARM REV CODE 636 W HCPCS: Performed by: STUDENT IN AN ORGANIZED HEALTH CARE EDUCATION/TRAINING PROGRAM

## 2023-04-03 PROCEDURE — 80053 COMPREHEN METABOLIC PANEL: CPT

## 2023-04-03 PROCEDURE — 85007 BL SMEAR W/DIFF WBC COUNT: CPT | Mod: NCS

## 2023-04-03 PROCEDURE — 99239 HOSP IP/OBS DSCHRG MGMT >30: CPT | Mod: ,,, | Performed by: STUDENT IN AN ORGANIZED HEALTH CARE EDUCATION/TRAINING PROGRAM

## 2023-04-03 PROCEDURE — 25000003 PHARM REV CODE 250: Performed by: STUDENT IN AN ORGANIZED HEALTH CARE EDUCATION/TRAINING PROGRAM

## 2023-04-03 PROCEDURE — 25000003 PHARM REV CODE 250

## 2023-04-03 PROCEDURE — 96366 THER/PROPH/DIAG IV INF ADDON: CPT

## 2023-04-03 PROCEDURE — 84100 ASSAY OF PHOSPHORUS: CPT

## 2023-04-03 PROCEDURE — 99239 PR HOSPITAL DISCHARGE DAY,>30 MIN: ICD-10-PCS | Mod: ,,, | Performed by: STUDENT IN AN ORGANIZED HEALTH CARE EDUCATION/TRAINING PROGRAM

## 2023-04-03 PROCEDURE — 36415 COLL VENOUS BLD VENIPUNCTURE: CPT

## 2023-04-03 RX ORDER — FOLIC ACID 1 MG/1
1 TABLET ORAL DAILY
Qty: 30 TABLET | Refills: 11 | Status: ON HOLD | OUTPATIENT
Start: 2023-04-03 | End: 2023-05-21 | Stop reason: SDUPTHER

## 2023-04-03 RX ORDER — ACETAMINOPHEN 325 MG/1
650 TABLET ORAL EVERY 6 HOURS PRN
Status: DISCONTINUED | OUTPATIENT
Start: 2023-04-03 | End: 2023-04-03 | Stop reason: HOSPADM

## 2023-04-03 RX ORDER — LANOLIN ALCOHOL/MO/W.PET/CERES
100 CREAM (GRAM) TOPICAL DAILY
Qty: 30 TABLET | Refills: 11 | Status: ON HOLD | OUTPATIENT
Start: 2023-04-03 | End: 2023-04-30 | Stop reason: SDUPTHER

## 2023-04-03 RX ORDER — DULOXETIN HYDROCHLORIDE 30 MG/1
30 CAPSULE, DELAYED RELEASE ORAL DAILY
Qty: 90 CAPSULE | Refills: 0 | Status: ON HOLD | OUTPATIENT
Start: 2023-04-03 | End: 2023-05-21 | Stop reason: HOSPADM

## 2023-04-03 RX ORDER — FLUTICASONE FUROATE AND VILANTEROL 100; 25 UG/1; UG/1
1 POWDER RESPIRATORY (INHALATION) DAILY
Qty: 60 EACH | Refills: 0 | Status: ON HOLD | OUTPATIENT
Start: 2023-04-03 | End: 2023-04-30 | Stop reason: SDUPTHER

## 2023-04-03 RX ADMIN — LEVOTHYROXINE SODIUM 50 MCG: 50 TABLET ORAL at 06:04

## 2023-04-03 RX ADMIN — THIAMINE HYDROCHLORIDE 500 MG: 100 INJECTION, SOLUTION INTRAMUSCULAR; INTRAVENOUS at 08:04

## 2023-04-03 RX ADMIN — ARIPIPRAZOLE 5 MG: 5 TABLET ORAL at 08:04

## 2023-04-03 RX ADMIN — FOLIC ACID 1 MG: 1 TABLET ORAL at 08:04

## 2023-04-03 RX ADMIN — THERA TABS 1 TABLET: TAB at 08:04

## 2023-04-03 RX ADMIN — Medication 1 PATCH: at 08:04

## 2023-04-03 RX ADMIN — DULOXETINE 30 MG: 30 CAPSULE, DELAYED RELEASE ORAL at 08:04

## 2023-04-03 RX ADMIN — TRAZODONE HYDROCHLORIDE 300 MG: 100 TABLET ORAL at 12:04

## 2023-04-03 RX ADMIN — AMLODIPINE BESYLATE 10 MG: 10 TABLET ORAL at 08:04

## 2023-04-03 RX ADMIN — ACETAMINOPHEN 650 MG: 325 TABLET ORAL at 12:04

## 2023-04-03 NOTE — HOSPITAL COURSE
Patient admitted as bounce-back to [facility] for increased weakness. She was recently discharged for alcohol withdrawal after several days of inpatient treatment. She has not had any alcohol since discharge. PT recommends home health, HM and PT physical examinations reveal that patient is safe to discharge home.     Of note, patient was noted to have liver cirrhosis. This diagnosis and the need to abstain from alcohol were discussed with the patient.

## 2023-04-03 NOTE — PLAN OF CARE
Pt is AAOx4,VS WNL on room air. Pt with orders to d/c IV and d/c home. Tolerated d/c of Iv. Awake ,alert, and oriented with no acute distress noted . Reviewed discharge orders including : medicine orders, prescriptions, followup appts, and patient education materials for diet and diagnosis. Belongings packed for transport  to home. Ambulating out at discharge by  per wheelchair

## 2023-04-03 NOTE — PLAN OF CARE
[facility] ([facility])  Discharge Final Note    Primary Care Provider: Andrew Rodriguez MD    Expected Discharge Date: 4/3/2023    Final Discharge Note (most recent)       Final Note - 04/03/23 1329          Final Note    Assessment Type Final Discharge Note     Anticipated Discharge Disposition Home or Self Care     Hospital Resources/Appts/Education Provided Appointments scheduled and added to AVS                 Monie Mendoza LMSW  [facility]  [id number]      Future Appointments   Date Time Provider Department Center   4/12/2023 10:00 AM [facility]

## 2023-04-03 NOTE — PLAN OF CARE
Pt noncompliant with using call light when needing to get out of bed. Pt educated on the use of wearing nonskid socks but stated that she does not like to wear socks. Pt was noncompliant with use of BiPaP at night and stated that it was too much pressure. Pt was put on 2L NC. Bed locked and in lowest position, call light within reach, side rails up x 3.

## 2023-04-03 NOTE — DISCHARGE SUMMARY
"[facility] ([place] [address])  [facility] Medicine  Discharge Summary      Patient Name: Earl Abdul  MRN: [id number]  IZA: [id number]  Patient Class: OP- Observation  Admission Date: 4/1/2023  Hospital Length of Stay: 0 days  Discharge Date and Time:  04/03/2023 11:27 AM  Attending Physician: Tara Javier DO   Discharging Provider: MERISSA JACKSON MD  Primary Care Provider: Andrew Rodriguez MD  [facility] Medicine Team: [facility] MERISSA JACKSON MD  Primary Care Team: [facility] MED 1    HPI:   Ms Abdul is a 62 year old female with significant PMHx of alcohol use disorder admitted multiple times for alcohol withdrawals, HTN, HLD, seizures, and COPD who presents back to the ED with her  2 days after discharge due to profound LE weakness. Per patient, and  interviewed by ED, she was able to walk up stairs when ggoing home post discharge however she has expressed profound fatigue and weakness causeing her to be bedbound. This resulted in a fall when she attempted to grab an item off the nightstand. She denies chest pain, fever, or emesis. She further denies alcohol consumption post discharge. She does endorse fatigue, SOB and nausea. In the ED she was found to be hypercapnic and requiring 2L NC to maintain O2 sats.       * No surgery found *      Hospital Course:   Patient admitted as bounce-back to [facility] for increased weakness. She was recently discharged for alcohol withdrawal after several days of inpatient treatment. She has not had any alcohol since discharge. PT recommends home health, HM and PT physical examinations reveal that patient is safe to discharge home.     Of note, patient was noted to have liver cirrhosis. This diagnosis and the need to abstain from alcohol were discussed with the patient.     BP (!) 144/75 (Patient Position: Lying)   Pulse 77   Temp 97.9 °F (36.6 °C) (Oral)   Resp 18   Ht 5' 6" (1.676 m)   Wt 86.9 kg (191 lb 9.3 oz)   SpO2 (!) 91%   BMI 30.92 kg/m² "     Physical Exam  Constitutional:       General: She is alert.      Appearance: She is overweight. She is not toxic-appearing or diaphoretic.   HENT:      Head: Normocephalic and atraumatic.   Cardiovascular:      Rate and Rhythm: Normal rate and regular rhythm.      Heart sounds: No murmur heard.  Pulmonary:      Effort: Pulmonary effort is normal.      Breath sounds: Normal breath sounds. No wheezes or rales.    Abdominal:      General: Abdomen is flat. There is no distension.      Palpations: Abdomen is soft.      Tenderness: There is no abdominal tenderness.   Neurological:      Mental Status: She is alert and easily aroused.       Motor: Weakness not noted.          Goals of Care Treatment Preferences:  Code Status: Full Code      Consults:     No new Assessment & Plan notes have been filed under this hospital service since the last note was generated.  Service: Hospital Medicine    Final Active Diagnoses:    Diagnosis Date Noted POA    PRINCIPAL PROBLEM:  Acute hypercapnic respiratory failure [J96.02] 04/01/2023 Yes    Alcohol use disorder, severe, dependence [F10.20] 02/07/2014 Yes     Chronic    COPD (chronic obstructive pulmonary disease) [J44.9] 04/17/2021 Yes    Type 2 diabetes mellitus with hyperglycemia [E11.65] 11/30/2021 Yes    Essential hypertension [I10] 02/07/2014 Yes    Depression with anxiety [F41.8] 08/16/2017 Yes    Anemia [D64.9] 11/30/2021 Yes    Low serum vitamin B12 [E53.8] 11/30/2021 Yes    Hypothyroidism [E03.9] 11/30/2021 Yes    Difficulty walking [R26.2] 09/24/2019 Yes      Problems Resolved During this Admission:       Discharged Condition: stable    Disposition: Home or Self Care    Follow Up:    Patient Instructions:      Notify your health care provider if you experience any of the following:  increased confusion or weakness     Notify your health care provider if you experience any of the following:  persistent nausea and vomiting or diarrhea     Activity as tolerated        Significant Diagnostic Studies: Labs: All labs within the past 24 hours have been reviewed    Pending Diagnostic Studies:       Procedure Component Value Units Date/Time    Heparin-induced platelet antibody [[id number]] Collected: 04/02/23 1228    Order Status: Sent Lab Status: In process Updated: 04/02/23 1245    Specimen: Blood            Medications:  Reconciled Home Medications:      Medication List        START taking these medications      fluticasone furoate-vilanteroL 100-25 mcg/dose diskus inhaler  Commonly known as: BREO ELLIPTA  Inhale 1 puff into the lungs once daily. Controller            CHANGE how you take these medications      DULoxetine 30 MG capsule  Commonly known as: CYMBALTA  Take 1 capsule (30 mg total) by mouth once daily. Take with 60mg =90mg QD  What changed: Another medication with the same name was removed. Continue taking this medication, and follow the directions you see here.     * folic acid 1 MG tablet  Commonly known as: FOLVITE  Take 1 tablet (1 mg total) by mouth once daily.  What changed: Another medication with the same name was added. Make sure you understand how and when to take each.     * folic acid 1 MG tablet  Commonly known as: FOLVITE  Take 1 tablet (1 mg total) by mouth once daily.  What changed: You were already taking a medication with the same name, and this prescription was added. Make sure you understand how and when to take each.     * thiamine 100 MG tablet  Take 1 tablet (100 mg total) by mouth once daily.  What changed: Another medication with the same name was added. Make sure you understand how and when to take each.     * thiamine 100 MG tablet  Take 1 tablet (100 mg total) by mouth once daily.  What changed: You were already taking a medication with the same name, and this prescription was added. Make sure you understand how and when to take each.           * This list has 4 medication(s) that are the same as other medications prescribed for you. Read  the directions carefully, and ask your doctor or other care provider to review them with you.                CONTINUE taking these medications      acamprosate 333 mg tablet  Commonly known as: CAMPRAL  Take 2 tablets (666 mg total) by mouth 3 (three) times daily.     acetaminophen 500 MG tablet  Commonly known as: TYLENOL  Take 500-1,500 mg by mouth daily as needed for Pain.     amLODIPine 10 MG tablet  Commonly known as: NORVASC  Take 1 tablet (10 mg total) by mouth once daily.     ARIPiprazole 5 MG Tab  Commonly known as: ABILIFY  Take 5 mg by mouth once daily.     dicyclomine 20 mg tablet  Commonly known as: BENTYL  Take 20 mg by mouth 4 (four) times daily.     divalproex 125 mg capsule  Commonly known as: DEPAKOTE SPRINKLE  Take 250 mg by mouth 2 (two) times daily.     gabapentin 600 MG tablet  Commonly known as: NEURONTIN  Take 1 tablet (600 mg total) by mouth 2 (two) times daily.     isosorbide mononitrate 30 MG 24 hr tablet  Commonly known as: IMDUR  Take 1 tablet (30 mg total) by mouth every evening.     levothyroxine 50 MCG tablet  Commonly known as: SYNTHROID  Take 1 tablet (50 mcg total) by mouth before breakfast.     loperamide 2 mg capsule  Commonly known as: IMODIUM  Take 2 mg by mouth 4 (four) times daily as needed for Diarrhea (Per package directions).     losartan 100 MG tablet  Commonly known as: COZAAR  Take 1 tablet (100 mg total) by mouth once daily.     metFORMIN 500 MG ER 24hr tablet  Commonly known as: GLUCOPHAGE-XR  Take 2 tablets (1,000 mg total) by mouth 2 (two) times daily with meals.     methocarbamoL 750 MG Tab  Commonly known as: ROBAXIN  Take 750 mg by mouth 3 (three) times daily as needed (Pain).     multivitamin per tablet  Commonly known as: THERAGRAN  Take 1 tablet by mouth once daily.     ondansetron 4 MG Tbdl  Commonly known as: ZOFRAN ODT  Take 1 tablet (4 mg total) by mouth every 6 (six) hours as needed (nausea).     pantoprazole 40 MG tablet  Commonly known as:  PROTONIX  Take 1 tablet (40 mg total) by mouth every morning.     propranoloL 20 MG tablet  Commonly known as: INDERAL  Take 20 mg by mouth 2 (two) times daily.     traZODone 300 MG tablet  Commonly known as: DESYREL  Take 1 tablet (300 mg total) by mouth every evening.            STOP taking these medications      ATROVENT HFA 17 mcg/actuation inhaler  Generic drug: ipratropium     chlordiazepoxide 25 MG Cap  Commonly known as: LIBRIUM            ASK your doctor about these medications      anastrozole 1 mg Tab  Commonly known as: ARIMIDEX  Take 1 tablet (1 mg total) by mouth every morning.              Indwelling Lines/Drains at time of discharge:   Lines/Drains/Airways       None                   Time spent on the discharge of patient: 35 minutes         MERISSA JACKSON MD  Department of Hospital Medicine  [facility] - Intensive Care ([place])

## 2023-04-03 NOTE — NURSING
PT REFUSING TO WEAR CONTINUOUS BIPAP, MD AWARE, UNABLE TO OBTAIN VBGs ON THIS UNIT PER CHARGE, WILL NOTIFY MD AND RESP TO CHANGE TO ABGs

## 2023-04-03 NOTE — PLAN OF CARE
[facility] ([place] [address])      HOME HEALTH ORDERS  FACE TO FACE ENCOUNTER    Patient Name: Earl Abdul  YOB: 1960    PCP: Andrew Rodriguez MD   PCP Address: [address] [place] [address] / [place] [id number]  PCP Phone Number: [phone]  PCP Fax: [phone]    Encounter Date: 4/1/23    Admit to Home Health    Diagnoses:  Active Hospital Problems    Diagnosis  POA    *Acute hypercapnic respiratory failure [J96.02]  Unknown    Alcohol use disorder, severe, dependence [F10.20]  Yes     Priority: 4      Chronic    COPD (chronic obstructive pulmonary disease) [J44.9]  Yes     Priority: 5     Type 2 diabetes mellitus with hyperglycemia [E11.65]  Yes     Priority: 7     Essential hypertension [I10]  Yes     Priority: 8     Depression with anxiety [F41.8]  Yes     Priority: 9     Anemia [D64.9]  Yes    Low serum vitamin B12 [E53.8]  Yes    Hypothyroidism [E03.9]  Yes    Difficulty walking [R26.2]  Yes      Resolved Hospital Problems   No resolved problems to display.       Follow Up Appointments:  Future Appointments   Date Time Provider Department Center   4/12/2023 10:00 AM [facility] MD [facility]       Allergies:  Review of patient's allergies indicates:   Allergen Reactions    Lortab [hydrocodone-acetaminophen] Itching    Promethazine Itching and Other (See Comments)       Medications: Review discharge medications with patient and family and provide education.    Current Facility-Administered Medications   Medication Dose Route Frequency Provider Last Rate Last Admin    acetaminophen tablet 650 mg  650 mg Oral Q6H PRN [place], DO   650 mg at 04/03/23 0034    amLODIPine tablet 10 mg  10 mg Oral Daily [place], DO   10 mg at 04/03/23 0825    ARIPiprazole tablet 5 mg  5 mg Oral Daily [place], DO   5 mg at 04/03/23 0825    dextrose 10% bolus 125 mL 125 mL  12.5 g Intravenous PRN [place], DO        dextrose 10% bolus 125 mL 125  mL  12.5 g Intravenous PRN [facility], DO        dextrose 10% bolus 250 mL 250 mL  25 g Intravenous PRN [facility], DO        dextrose 40 % gel 15,000 mg  15 g Oral PRN [facility], DO        dextrose 40 % gel 30,000 mg  30 g Oral PRN [facility], DO        DULoxetine DR capsule 30 mg  30 mg Oral Daily [facility], DO   30 mg at 04/03/23 0825    folic acid tablet 1 mg  1 mg Oral Daily [facility], DO   1 mg at 04/03/23 0825    glucagon (human recombinant) injection 1 mg  1 mg Intramuscular PRN [facility], DO        glucagon (human recombinant) injection 1 mg  1 mg Intramuscular PRN [facility],         insulin aspart U-100 pen 0-5 Units  0-5 Units Subcutaneous Q6H PRN [facility],         ipratropium inhaler 2 puff  2 puff Inhalation Q6H PRN [facility],         isosorbide mononitrate 24 hr tablet 30 mg  30 mg Oral QHS [facility], DO   30 mg at 04/02/23 2037    levothyroxine tablet 50 mcg  50 mcg Oral Before breakfast [facility], DO   50 mcg at 04/03/23 0654    multivitamin tablet  1 tablet Oral Daily [facility], DO   1 tablet at 04/03/23 0825    naloxone 0.4 mg/mL injection 0.02 mg  0.02 mg Intravenous PRN [facility]        nicotine 21 mg/24 hr 1 patch  1 patch Transdermal Daily [facility]   1 patch at 04/03/23 0823    sodium chloride 0.9% flush 10 mL  10 mL Intravenous Q12H PRN [facility]        thiamine (B-1) 500 mg in dextrose 5 % (D5W) 100 mL IVPB  500 mg Intravenous TID Tara Sleem, DO   Stopped at 04/03/23 0856    traZODone tablet 300 mg  300 mg Oral Nightly PRN [facility], DO   300 mg at 04/03/23 0034     Facility-Administered Medications Ordered in Other Encounters   Medication Dose Route Frequency Provider Last Rate Last Admin    albuterol sulfate nebulizer solution 2.5 mg  2.5 mg Nebulization Once Andrew Rodriguez MD         Current Discharge Medication List        START taking these medications    Details    fluticasone furoate-vilanteroL (BREO ELLIPTA) 100-25 mcg/dose diskus inhaler Inhale 1 puff into the lungs once daily. Controller  Qty: 60 each, Refills: 0      !! folic acid (FOLVITE) 1 MG tablet Take 1 tablet (1 mg total) by mouth once daily.  Qty: 30 tablet, Refills: 11      !! thiamine 100 MG tablet Take 1 tablet (100 mg total) by mouth once daily.  Qty: 30 tablet, Refills: 11       !! - Potential duplicate medications found. Please discuss with provider.        CONTINUE these medications which have CHANGED    Details   DULoxetine (CYMBALTA) 30 MG capsule Take 1 capsule (30 mg total) by mouth once daily. Take with 60mg =90mg QD  Qty: 90 capsule, Refills: 0    Associated Diagnoses: Depression with anxiety           CONTINUE these medications which have NOT CHANGED    Details   acamprosate (CAMPRAL) 333 mg tablet Take 2 tablets (666 mg total) by mouth 3 (three) times daily.  Qty: 180 tablet, Refills: 0      acetaminophen (TYLENOL) 500 MG tablet Take 500-1,500 mg by mouth daily as needed for Pain.      amLODIPine (NORVASC) 10 MG tablet Take 1 tablet (10 mg total) by mouth once daily.  Qty: 90 tablet, Refills: 3    Comments: .      anastrozole (ARIMIDEX) 1 mg Tab Take 1 tablet (1 mg total) by mouth every morning.  Qty: 90 tablet, Refills: 3    Associated Diagnoses: Malignant neoplasm of central portion of right breast in female, estrogen receptor positive      ARIPiprazole (ABILIFY) 5 MG Tab Take 5 mg by mouth once daily.      dicyclomine (BENTYL) 20 mg tablet Take 20 mg by mouth 4 (four) times daily.      divalproex (DEPAKOTE SPRINKLE) 125 mg capsule Take 250 mg by mouth 2 (two) times daily.      !! folic acid (FOLVITE) 1 MG tablet Take 1 tablet (1 mg total) by mouth once daily.  Qty: 30 tablet, Refills: 3      gabapentin (NEURONTIN) 600 MG tablet Take 1 tablet (600 mg total) by mouth 2 (two) times daily.  Qty: 180 tablet, Refills: 3      isosorbide mononitrate (IMDUR) 30 MG 24 hr tablet Take 1 tablet (30 mg  total) by mouth every evening.  Qty: 90 tablet, Refills: 3      levothyroxine (SYNTHROID) 50 MCG tablet Take 1 tablet (50 mcg total) by mouth before breakfast.  Qty: 90 tablet, Refills: 3    Associated Diagnoses: Hypothyroidism, unspecified type      loperamide (IMODIUM) 2 mg capsule Take 2 mg by mouth 4 (four) times daily as needed for Diarrhea (Per package directions).      losartan (COZAAR) 100 MG tablet Take 1 tablet (100 mg total) by mouth once daily.  Qty: 90 tablet, Refills: 3    Comments: .      metFORMIN (GLUCOPHAGE-XR) 500 MG ER 24hr tablet Take 2 tablets (1,000 mg total) by mouth 2 (two) times daily with meals.  Qty: 360 tablet, Refills: 3    Associated Diagnoses: Uncontrolled type 2 diabetes mellitus with hyperglycemia      methocarbamoL (ROBAXIN) 750 MG Tab Take 750 mg by mouth 3 (three) times daily as needed (Pain).      multivitamin (THERAGRAN) per tablet Take 1 tablet by mouth once daily.      ondansetron (ZOFRAN ODT) 4 MG TbDL Take 1 tablet (4 mg total) by mouth every 6 (six) hours as needed (nausea).  Qty: 20 tablet, Refills: 0      pantoprazole (PROTONIX) 40 MG tablet Take 1 tablet (40 mg total) by mouth every morning.  Qty: 30 tablet, Refills: 0      propranoloL (INDERAL) 20 MG tablet Take 20 mg by mouth 2 (two) times daily.      !! thiamine 100 MG tablet Take 1 tablet (100 mg total) by mouth once daily.  Qty: 90 tablet, Refills: 3      traZODone (DESYREL) 300 MG tablet Take 1 tablet (300 mg total) by mouth every evening.  Qty: 30 tablet, Refills: 11       !! - Potential duplicate medications found. Please discuss with provider.        STOP taking these medications       ATROVENT HFA 17 mcg/actuation inhaler Comments:   Reason for Stopping:         chlordiazepoxide (LIBRIUM) 25 MG Cap Comments:   Reason for Stopping:                 I have seen and examined this patient within the last 30 days. My clinical findings that support the need for the home health skilled services and home bound status  are the following:no   Weakness/numbness causing balance and gait disturbance due to alcohol use disorder making it taxing to leave home.     Diet:   regular diet    Labs:  Report Lab results to PCP.    Referrals/ Consults  Physical Therapy to evaluate and treat. Evaluate for home safety and equipment needs; Establish/upgrade home exercise program. Perform / instruct on therapeutic exercises, gait training, transfer training, and Range of Motion.  Occupational Therapy to evaluate and treat. Evaluate home environment for safety and equipment needs. Perform/Instruct on transfers, ADL training, ROM, and therapeutic exercises.  Aide to provide assistance with personal care, ADLs, and vital signs.    Activities:   activity as tolerated    Nursing:   Agency to admit patient within 24 hours of hospital discharge unless specified on physician order or at patient request    SN to complete comprehensive assessment including routine vital signs. Instruct on disease process and s/s of complications to report to MD. Review/verify medication list sent home with the patient at time of discharge  and instruct patient/caregiver as needed. Frequency may be adjusted depending on start of care date.     Skilled nurse to perform up to 3 visits PRN for symptoms related to diagnosis    Notify MD if SBP > 160 or < 90; DBP > 90 or < 50; HR > 120 or < 50; Temp > 101; O2 < 88%; Other:   acute worsening of condition    Ok to schedule additional visits based on staff availability and patient request on consecutive days within the home health episode.    When multiple disciplines ordered:    Start of Care occurs on Sunday - Wednesday schedule remaining discipline evaluations as ordered on separate consecutive days following the start of care.    Thursday SOC -schedule subsequent evaluations Friday and Monday the following week.     Friday - Saturday SOC - schedule subsequent discipline evaluations on consecutive days starting Monday of the  following week.    For all post-discharge communication and subsequent orders please contact patient's primary care physician.     Miscellaneous   Diabetic Care:   Report CBG < 60 or > 350 to physician.    Home Health Aide:  Nursing Three times weekly, Physical Therapy Three times weekly, Occupational Therapy Three times weekly, and Home Health Aide Three times weekly    Wound Care Orders  no    I certify that this patient is confined to her home and needs intermittent skilled nursing care, physical therapy, and occupational therapy.        Denilson Hurd M.D.  04/03/23

## 2023-04-06 ENCOUNTER — OFFICE VISIT (OUTPATIENT)
Dept: INTERNAL MEDICINE | Facility: CLINIC | Age: 63
End: 2023-04-06
Attending: INTERNAL MEDICINE
Payer: COMMERCIAL

## 2023-04-06 ENCOUNTER — HOSPITAL ENCOUNTER (OUTPATIENT)
Dept: RADIOLOGY | Facility: OTHER | Age: 63
Discharge: HOME | End: 2023-04-06
Attending: INTERNAL MEDICINE
Payer: COMMERCIAL

## 2023-04-06 ENCOUNTER — TELEPHONE (OUTPATIENT)
Dept: INTERNAL MEDICINE | Facility: CLINIC | Age: 63
End: 2023-04-06

## 2023-04-06 VITALS
HEART RATE: 80 BPM | HEIGHT: 66 IN | OXYGEN SATURATION: 92 % | SYSTOLIC BLOOD PRESSURE: 110 MMHG | WEIGHT: 180.13 LBS | BODY MASS INDEX: 28.95 KG/M2 | DIASTOLIC BLOOD PRESSURE: 56 MMHG

## 2023-04-06 DIAGNOSIS — J44.9 CHRONIC OBSTRUCTIVE PULMONARY DISEASE, UNSPECIFIED COPD TYPE: ICD-10-CM

## 2023-04-06 DIAGNOSIS — G47.33 OSA (OBSTRUCTIVE SLEEP APNEA): ICD-10-CM

## 2023-04-06 DIAGNOSIS — R53.83 FATIGUE, UNSPECIFIED TYPE: ICD-10-CM

## 2023-04-06 DIAGNOSIS — F10.20 ALCOHOL USE DISORDER, SEVERE, DEPENDENCE: Primary | Chronic | ICD-10-CM

## 2023-04-06 DIAGNOSIS — E11.65 UNCONTROLLED TYPE 2 DIABETES MELLITUS WITH HYPERGLYCEMIA: ICD-10-CM

## 2023-04-06 DIAGNOSIS — I10 ESSENTIAL HYPERTENSION: ICD-10-CM

## 2023-04-06 LAB
BACTERIA BLD CULT: NORMAL
BACTERIA BLD CULT: NORMAL

## 2023-04-06 PROCEDURE — 3072F PR LOW RISK FOR RETINOPATHY: ICD-10-PCS | Mod: CPTII,S$GLB,, | Performed by: INTERNAL MEDICINE

## 2023-04-06 PROCEDURE — 71046 X-RAY EXAM CHEST 2 VIEWS: CPT | Mod: 26,,, | Performed by: RADIOLOGY

## 2023-04-06 PROCEDURE — 1111F PR DISCHARGE MEDS RECONCILED W/ CURRENT OUTPATIENT MED LIST: ICD-10-PCS | Mod: CPTII,S$GLB,, | Performed by: INTERNAL MEDICINE

## 2023-04-06 PROCEDURE — 99999 PR PBB SHADOW E&M-EST. PATIENT-LVL III: ICD-10-PCS | Mod: PBBFAC,,, | Performed by: INTERNAL MEDICINE

## 2023-04-06 PROCEDURE — 1160F RVW MEDS BY RX/DR IN RCRD: CPT | Mod: CPTII,S$GLB,, | Performed by: INTERNAL MEDICINE

## 2023-04-06 PROCEDURE — 3008F BODY MASS INDEX DOCD: CPT | Mod: CPTII,S$GLB,, | Performed by: INTERNAL MEDICINE

## 2023-04-06 PROCEDURE — 3074F PR MOST RECENT SYSTOLIC BLOOD PRESSURE < 130 MM HG: ICD-10-PCS | Mod: CPTII,S$GLB,, | Performed by: INTERNAL MEDICINE

## 2023-04-06 PROCEDURE — 1111F DSCHRG MED/CURRENT MED MERGE: CPT | Mod: CPTII,S$GLB,, | Performed by: INTERNAL MEDICINE

## 2023-04-06 PROCEDURE — 99999 PR PBB SHADOW E&M-EST. PATIENT-LVL III: CPT | Mod: PBBFAC,,, | Performed by: INTERNAL MEDICINE

## 2023-04-06 PROCEDURE — 3044F HG A1C LEVEL LT 7.0%: CPT | Mod: CPTII,S$GLB,, | Performed by: INTERNAL MEDICINE

## 2023-04-06 PROCEDURE — 1159F MED LIST DOCD IN RCRD: CPT | Mod: CPTII,S$GLB,, | Performed by: INTERNAL MEDICINE

## 2023-04-06 PROCEDURE — 3078F DIAST BP <80 MM HG: CPT | Mod: CPTII,S$GLB,, | Performed by: INTERNAL MEDICINE

## 2023-04-06 PROCEDURE — 1160F PR REVIEW ALL MEDS BY PRESCRIBER/CLIN PHARMACIST DOCUMENTED: ICD-10-PCS | Mod: CPTII,S$GLB,, | Performed by: INTERNAL MEDICINE

## 2023-04-06 PROCEDURE — 4010F PR ACE/ARB THEARPY RXD/TAKEN: ICD-10-PCS | Mod: CPTII,S$GLB,, | Performed by: INTERNAL MEDICINE

## 2023-04-06 PROCEDURE — 71046 XR CHEST PA AND LATERAL: ICD-10-PCS | Mod: 26,,, | Performed by: RADIOLOGY

## 2023-04-06 PROCEDURE — 3074F SYST BP LT 130 MM HG: CPT | Mod: CPTII,S$GLB,, | Performed by: INTERNAL MEDICINE

## 2023-04-06 PROCEDURE — 71046 X-RAY EXAM CHEST 2 VIEWS: CPT | Mod: TC,FY

## 2023-04-06 PROCEDURE — 3072F LOW RISK FOR RETINOPATHY: CPT | Mod: CPTII,S$GLB,, | Performed by: INTERNAL MEDICINE

## 2023-04-06 PROCEDURE — 1159F PR MEDICATION LIST DOCUMENTED IN MEDICAL RECORD: ICD-10-PCS | Mod: CPTII,S$GLB,, | Performed by: INTERNAL MEDICINE

## 2023-04-06 PROCEDURE — 3008F PR BODY MASS INDEX (BMI) DOCUMENTED: ICD-10-PCS | Mod: CPTII,S$GLB,, | Performed by: INTERNAL MEDICINE

## 2023-04-06 PROCEDURE — 3078F PR MOST RECENT DIASTOLIC BLOOD PRESSURE < 80 MM HG: ICD-10-PCS | Mod: CPTII,S$GLB,, | Performed by: INTERNAL MEDICINE

## 2023-04-06 PROCEDURE — 4010F ACE/ARB THERAPY RXD/TAKEN: CPT | Mod: CPTII,S$GLB,, | Performed by: INTERNAL MEDICINE

## 2023-04-06 PROCEDURE — 3044F PR MOST RECENT HEMOGLOBIN A1C LEVEL <7.0%: ICD-10-PCS | Mod: CPTII,S$GLB,, | Performed by: INTERNAL MEDICINE

## 2023-04-06 PROCEDURE — 99214 OFFICE O/P EST MOD 30 MIN: CPT | Mod: S$GLB,,, | Performed by: INTERNAL MEDICINE

## 2023-04-06 PROCEDURE — 99214 PR OFFICE/OUTPT VISIT, EST, LEVL IV, 30-39 MIN: ICD-10-PCS | Mod: S$GLB,,, | Performed by: INTERNAL MEDICINE

## 2023-04-06 RX ORDER — METFORMIN HYDROCHLORIDE 500 MG/1
1000 TABLET, EXTENDED RELEASE ORAL 2 TIMES DAILY WITH MEALS
Qty: 360 TABLET | Refills: 3 | Status: ON HOLD | OUTPATIENT
Start: 2023-04-06 | End: 2023-05-21 | Stop reason: SDUPTHER

## 2023-04-06 RX ORDER — TRAZODONE HYDROCHLORIDE 300 MG/1
300 TABLET ORAL NIGHTLY
Qty: 30 TABLET | Refills: 2 | Status: ON HOLD | OUTPATIENT
Start: 2023-04-06 | End: 2023-04-30 | Stop reason: SDUPTHER

## 2023-04-06 RX ORDER — CHOLECALCIFEROL (VITAMIN D3) 125 MCG
5000 CAPSULE ORAL
COMMUNITY
Start: 2022-11-25 | End: 2023-05-15

## 2023-04-06 RX ORDER — LISINOPRIL 20 MG/1
20 TABLET ORAL
Status: ON HOLD | COMMUNITY
End: 2023-04-30 | Stop reason: SDUPTHER

## 2023-04-06 RX ORDER — IPRATROPIUM BROMIDE AND ALBUTEROL 20; 100 UG/1; UG/1
SPRAY, METERED RESPIRATORY (INHALATION)
COMMUNITY
Start: 2023-01-14 | End: 2023-05-15

## 2023-04-06 RX ORDER — HYDROXYZINE PAMOATE 50 MG/1
CAPSULE ORAL
Status: ON HOLD | COMMUNITY
Start: 2022-10-24 | End: 2023-04-30 | Stop reason: HOSPADM

## 2023-04-06 RX ORDER — CLONIDINE HYDROCHLORIDE 0.1 MG/1
0.1 TABLET ORAL
COMMUNITY
End: 2023-04-06

## 2023-04-06 RX ORDER — PROPRANOLOL HYDROCHLORIDE 20 MG/1
20 TABLET ORAL 2 TIMES DAILY
Qty: 90 TABLET | Refills: 2 | Status: SHIPPED | OUTPATIENT
Start: 2023-04-06 | End: 2023-05-15

## 2023-04-06 RX ORDER — DULOXETIN HYDROCHLORIDE 60 MG/1
60 CAPSULE, DELAYED RELEASE ORAL DAILY
Status: ON HOLD | COMMUNITY
End: 2023-05-21 | Stop reason: HOSPADM

## 2023-04-06 RX ORDER — CYANOCOBALAMIN (VITAMIN B-12) 500 MCG
TABLET ORAL NIGHTLY PRN
Status: ON HOLD | COMMUNITY
Start: 2022-11-25 | End: 2023-11-24

## 2023-04-06 NOTE — PROGRESS NOTES
"Subjective:       Patient ID: Earl Abdul is a 62 y.o. female.    Chief Complaint: Hospital Follow Up    Here for hospital f/u    63 yo F with PMHx of HTN, HLD, Breast CA, DM, COPD (night oxygen of 3L), MDD (hx SA), Etoh abuse (withdrawal seizures in past and pancreatitis), Former Smoker 1ppd, Hepatic Steatosis, Hypothyroidism, Sarcoidosis of Lung (not active), PUD/Gastritis, hx C. Diff (2014)    She is re-admitted with severe fatigue and weakness that resulted in a mechanical fall at home likely secondary to hypercapnia. Patient has COPD and reports non-adherence to her home inhalers. Patient was readmitted for BIPAP and PT evaluation. Did well on a course of BIPAP. Goal O2 is between 88-92%. Will send home with a LABA/ICS inhaler.    Last drink reported 11 days prior.    Continued malaise. Wakes feeling okay but aftwer taking morning meds she is sedated all day. Double vision as of late. Closing one eye in order to see better. Falls asleep easily.   QAM beta blocker, imdur, gabapentin, cymbalta    ### MBCL/CLL ###  - 8/27/2022 CV Heme Dr Miguel Martin writes "-MBCL/CLL; did meet CLL criteria on one cbc June 2022 but has since returned to MBCL criteria;favorable risk 13 q del by fish; will send tp53 sequencing and ighv mutation with next lab draw for better risk stratification; no indication for imaging or marrow biopsy currently; do not feel cytopenias are related to her Lymphoproliferative disorder"     ### OAB ###  Intermittent diarrhea and OAB initially improved c/ stage II interstim       ### Sleep related breathing disorder-- COPD ###  06/2020 CV Dr Gregorio  9/30/2020 PSG with Parasomnia montage: "Little SDB events appeared to have been noted. Snoring was noted to be mild. The patient did not meet split night criteria set by the doctor.     ### DM ###  Home -210  Metformin 1g BID     ### Tobacco abuse/ COPD ###  11/14/2020 CT Chest: Multiple small ground-glass opacities in the right lung " "measuring up to 1.2 cm.  Findings are new when compared with 08/29/2020, which may favor a low-grade infectious or inflammatory process.  Recommend follow-up with noncontrast chest CT in 6-12 months to evaluate for resolution. Mildly enlarged nolvia hepatis lymph nodes and axillary lymph nodes, similar when compared with prior CT   -Next CT Chest Due 3/2024      ### hx breast CA ###  -Of note underwent bilateral mastectomy for  T1b N0 stage IA breast cancer October 2020 with no adjuvant therapy and was started on  Letrozole February 2021-may 2021.  6/30/2022 Nusrat STiffanie Prabhakar writes "Continue anastrozole. DXA due in 1 year last 2021 Normal."     COVID in January - also treated for ETOH withradha  10/2022 inpt for etoh withdrawal   12/2022 Inpatient 60 day detox [place], [place]        03/28/22 6MWT During exercise, there was significant desaturation while breathing room air. Both blood pressure and heart rate remained stable with walking          Review of Systems   Constitutional:  Negative for chills, fatigue, fever and unexpected weight change.   HENT:  Negative for ear pain, hearing loss, postnasal drip, tinnitus, trouble swallowing and voice change.    Respiratory:  Negative for cough, chest tightness, shortness of breath and wheezing.    Cardiovascular:  Negative for chest pain, palpitations and leg swelling.   Gastrointestinal:  Negative for abdominal pain, blood in stool, diarrhea, nausea and vomiting.   Endocrine: Negative for polydipsia, polyphagia and polyuria.   Genitourinary:  Negative for difficulty urinating, dysuria, hematuria and vaginal bleeding.   Skin:  Negative for rash.   Allergic/Immunologic: Negative for food allergies.   Neurological:  Negative for dizziness, numbness and headaches.   Hematological:  Does not bruise/bleed easily.   Psychiatric/Behavioral:  The patient is not nervous/anxious.      Objective:      Vitals:    04/06/23 1028   BP: (!) 110/56   Pulse: 80   SpO2: (!) 92% " "  Weight: 81.7 kg (180 lb 1.9 oz)   Height: 5' 6" (1.676 m)      Physical Exam  Vitals and nursing note reviewed.   Constitutional:       General: She is not in acute distress.     Appearance: Normal appearance. She is well-developed.   HENT:      Head: Normocephalic and atraumatic.      Mouth/Throat:      Pharynx: No oropharyngeal exudate.   Eyes:      General: No scleral icterus.     Conjunctiva/sclera: Conjunctivae normal.      Pupils: Pupils are equal, round, and reactive to light.   Neck:      Thyroid: No thyromegaly.   Cardiovascular:      Rate and Rhythm: Normal rate and regular rhythm.      Heart sounds: Normal heart sounds. No murmur heard.  Pulmonary:      Effort: Pulmonary effort is normal.      Breath sounds: Normal breath sounds. No wheezing or rales.   Abdominal:      General: There is no distension.   Musculoskeletal:         General: No tenderness.   Lymphadenopathy:      Cervical: No cervical adenopathy.   Skin:     General: Skin is warm and dry.   Neurological:      Mental Status: She is alert and oriented to person, place, and time.   Psychiatric:         Behavior: Behavior normal.       Assessment:       1. Alcohol use disorder, severe, dependence    2. Chronic obstructive pulmonary disease, unspecified COPD type    3. VINICIO (obstructive sleep apnea)    4. Essential hypertension    5. Uncontrolled type 2 diabetes mellitus with hyperglycemia    6. Fatigue, unspecified type        Plan:       Earl was seen today for hospital follow up.    Diagnoses and all orders for this visit:    Alcohol use disorder, severe, dependence    Chronic obstructive pulmonary disease, unspecified COPD type    VINICIO (obstructive sleep apnea)   Stressed adherence of and complications related to untreated VINICIO.    Essential hypertension  Controlled and asymptomatic.  Continue current Rx regimen.    Uncontrolled type 2 diabetes mellitus with hyperglycemia  -     metFORMIN (GLUCOPHAGE-XR) 500 MG ER 24hr tablet; Take 2 tablets " (1,000 mg total) by mouth 2 (two) times daily with meals.    Fatigue, unspecified type   Suspect multiple chronic co morbidities in face of polypharmacy  -     Comprehensive Metabolic Panel; Future  -     CBC Auto Differential; Future  -     VITAMIN B1; Future  -     Folate; Future  -     AMMONIA; Future  -     SCHEDULED EKG 12-LEAD (to Muse); Future  -     Vitamin B12; Future  -     CULTURE, BLOOD; Future  -     Toxicology Screen, Urine  -     Cancel: Urinalysis, Reflex to Urine Culture Urine, Clean Catch; Future  -     X-Ray Chest PA And Lateral; Future    Other orders  -     traZODone (DESYREL) 300 MG tablet; Take 1 tablet (300 mg total) by mouth every evening.  -     propranoloL (INDERAL) 20 MG tablet; Take 1 tablet (20 mg total) by mouth 2 (two) times daily.           Andrew Chase MD  Internal Medicine-[facility]        Side effects of medication(s) were discussed in detail and patient voiced understanding.  Patient will call back for any issues or complications.

## 2023-04-06 NOTE — TELEPHONE ENCOUNTER
Returned pt's 's call. Pt appt was held for today (diff time than told) Scheduled in a new/open slot and removed holds. Pt on her way momentarily for appt.

## 2023-04-06 NOTE — TELEPHONE ENCOUNTER
----- Message from Selena Reyna sent at 4/6/2023  8:22 AM CDT -----  Regarding: r/s appt for today if possible  Name of Who is Calling:  Henrique/         What is the request in detail:  Pt has a hosp f/u 4/12.  says they will be out of town and is asking if he can r/s for today. Please c/b to r/s,        Can the clinic reply by [facility]:          What Number to Call Back if not in [facility]:[phone]

## 2023-04-11 ENCOUNTER — EXTERNAL HOME HEALTH (OUTPATIENT)
Dept: HOME HEALTH SERVICES | Facility: HOSPITAL | Age: 63
End: 2023-04-11
Payer: COMMERCIAL

## 2023-04-17 ENCOUNTER — TELEPHONE (OUTPATIENT)
Dept: INTERNAL MEDICINE | Facility: CLINIC | Age: 63
End: 2023-04-17
Payer: COMMERCIAL

## 2023-04-17 DIAGNOSIS — R53.83 FATIGUE, UNSPECIFIED TYPE: Primary | ICD-10-CM

## 2023-04-17 NOTE — TELEPHONE ENCOUNTER
Please review and sign not sure why it was not completed as I told them multiple times that she needs to get blood and urine done.   Labs are completed.

## 2023-04-17 NOTE — TELEPHONE ENCOUNTER
----- Message from Celina Neal sent at 4/17/2023  7:57 AM CDT -----  Jazmine Patricio from lab called saying that the urine specimen has not been submitted on this patient over 10 days they are cancelling due no specimen received.

## 2023-04-19 ENCOUNTER — TELEPHONE (OUTPATIENT)
Dept: INTERNAL MEDICINE | Facility: CLINIC | Age: 63
End: 2023-04-19
Payer: COMMERCIAL

## 2023-04-25 ENCOUNTER — HOSPITAL ENCOUNTER (INPATIENT)
Facility: HOSPITAL | Age: 63
LOS: 4 days | Discharge: HOME | DRG: 897 | End: 2023-04-30
Attending: EMERGENCY MEDICINE | Admitting: STUDENT IN AN ORGANIZED HEALTH CARE EDUCATION/TRAINING PROGRAM
Payer: COMMERCIAL

## 2023-04-25 DIAGNOSIS — R11.0 NAUSEA: ICD-10-CM

## 2023-04-25 DIAGNOSIS — Z71.89 ADVANCE CARE PLANNING: ICD-10-CM

## 2023-04-25 DIAGNOSIS — R11.2 NAUSEA & VOMITING: ICD-10-CM

## 2023-04-25 DIAGNOSIS — J96.01 ACUTE HYPOXEMIC RESPIRATORY FAILURE: ICD-10-CM

## 2023-04-25 DIAGNOSIS — E87.29 ALCOHOLIC KETOACIDOSIS: Primary | ICD-10-CM

## 2023-04-25 DIAGNOSIS — R07.9 CHEST PAIN: ICD-10-CM

## 2023-04-25 DIAGNOSIS — F10.20 ALCOHOL USE DISORDER, SEVERE, DEPENDENCE: ICD-10-CM

## 2023-04-25 DIAGNOSIS — Z71.89 GOALS OF CARE, COUNSELING/DISCUSSION: ICD-10-CM

## 2023-04-25 DIAGNOSIS — F32.A DEPRESSION, UNSPECIFIED DEPRESSION TYPE: ICD-10-CM

## 2023-04-25 DIAGNOSIS — F10.930 ALCOHOL WITHDRAWAL SYNDROME WITHOUT COMPLICATION: ICD-10-CM

## 2023-04-25 DIAGNOSIS — Z51.5 PALLIATIVE CARE ENCOUNTER: ICD-10-CM

## 2023-04-25 DIAGNOSIS — F10.939 ALCOHOL WITHDRAWAL SYNDROME WITH COMPLICATION: ICD-10-CM

## 2023-04-25 PROBLEM — F10.151: Status: ACTIVE | Noted: 2020-07-07

## 2023-04-25 LAB
ALBUMIN SERPL BCP-MCNC: 4.6 G/DL (ref 3.5–5.2)
ALLENS TEST: ABNORMAL
ALP SERPL-CCNC: 55 U/L (ref 55–135)
ALT SERPL W/O P-5'-P-CCNC: 25 U/L (ref 10–44)
ANION GAP SERPL CALC-SCNC: 28 MMOL/L (ref 8–16)
AST SERPL-CCNC: 65 U/L (ref 10–40)
B-OH-BUTYR BLD STRIP-SCNC: 7.2 MMOL/L (ref 0–0.5)
BILIRUB SERPL-MCNC: 0.8 MG/DL (ref 0.1–1)
BNP SERPL-MCNC: 28 PG/ML (ref 0–99)
BUN SERPL-MCNC: 35 MG/DL (ref 8–23)
CALCIUM SERPL-MCNC: 8.7 MG/DL (ref 8.7–10.5)
CHLORIDE SERPL-SCNC: 91 MMOL/L (ref 95–110)
CO2 SERPL-SCNC: 20 MMOL/L (ref 23–29)
CREAT SERPL-MCNC: 1.5 MG/DL (ref 0.5–1.4)
EST. GFR  (NO RACE VARIABLE): 38.7 ML/MIN/1.73 M^2
ETHANOL SERPL-MCNC: 86 MG/DL
GLUCOSE SERPL-MCNC: 54 MG/DL (ref 70–110)
HCO3 UR-SCNC: 26.2 MMOL/L (ref 24–28)
LIPASE SERPL-CCNC: 18 U/L (ref 4–60)
MAGNESIUM SERPL-MCNC: 2 MG/DL (ref 1.6–2.6)
PCO2 BLDA: 42.8 MMHG (ref 35–45)
PH SMN: 7.39 [PH] (ref 7.35–7.45)
PO2 BLDA: 161 MMHG (ref 40–60)
POC BE: 1 MMOL/L
POC SATURATED O2: 99 % (ref 95–100)
POC TCO2: 27 MMOL/L (ref 24–29)
POCT GLUCOSE: 149 MG/DL (ref 70–110)
POTASSIUM SERPL-SCNC: 4.5 MMOL/L (ref 3.5–5.1)
PROT SERPL-MCNC: 7.8 G/DL (ref 6–8.4)
SAMPLE: ABNORMAL
SITE: ABNORMAL
SODIUM SERPL-SCNC: 139 MMOL/L (ref 136–145)
TROPONIN I SERPL DL<=0.01 NG/ML-MCNC: 0.01 NG/ML (ref 0–0.03)

## 2023-04-25 PROCEDURE — 83690 ASSAY OF LIPASE: CPT | Performed by: EMERGENCY MEDICINE

## 2023-04-25 PROCEDURE — 96361 HYDRATE IV INFUSION ADD-ON: CPT

## 2023-04-25 PROCEDURE — 63600175 PHARM REV CODE 636 W HCPCS: Performed by: PHYSICIAN ASSISTANT

## 2023-04-25 PROCEDURE — 99285 EMERGENCY DEPT VISIT HI MDM: CPT | Mod: 25

## 2023-04-25 PROCEDURE — 82962 GLUCOSE BLOOD TEST: CPT

## 2023-04-25 PROCEDURE — 93010 EKG 12-LEAD: ICD-10-PCS | Mod: ,,, | Performed by: INTERNAL MEDICINE

## 2023-04-25 PROCEDURE — 83605 ASSAY OF LACTIC ACID: CPT | Performed by: PHYSICIAN ASSISTANT

## 2023-04-25 PROCEDURE — 82077 ASSAY SPEC XCP UR&BREATH IA: CPT | Mod: 91 | Performed by: EMERGENCY MEDICINE

## 2023-04-25 PROCEDURE — 96365 THER/PROPH/DIAG IV INF INIT: CPT

## 2023-04-25 PROCEDURE — 87040 BLOOD CULTURE FOR BACTERIA: CPT | Performed by: PHYSICIAN ASSISTANT

## 2023-04-25 PROCEDURE — 83880 ASSAY OF NATRIURETIC PEPTIDE: CPT | Performed by: EMERGENCY MEDICINE

## 2023-04-25 PROCEDURE — 96367 TX/PROPH/DG ADDL SEQ IV INF: CPT

## 2023-04-25 PROCEDURE — 93010 ELECTROCARDIOGRAM REPORT: CPT | Mod: ,,, | Performed by: INTERNAL MEDICINE

## 2023-04-25 PROCEDURE — 99900035 HC TECH TIME PER 15 MIN (STAT)

## 2023-04-25 PROCEDURE — 99285 EMERGENCY DEPT VISIT HI MDM: CPT | Mod: ,,, | Performed by: PHYSICIAN ASSISTANT

## 2023-04-25 PROCEDURE — 82077 ASSAY SPEC XCP UR&BREATH IA: CPT | Performed by: EMERGENCY MEDICINE

## 2023-04-25 PROCEDURE — 25000003 PHARM REV CODE 250: Performed by: PHYSICIAN ASSISTANT

## 2023-04-25 PROCEDURE — 83930 ASSAY OF BLOOD OSMOLALITY: CPT | Performed by: EMERGENCY MEDICINE

## 2023-04-25 PROCEDURE — 82803 BLOOD GASES ANY COMBINATION: CPT

## 2023-04-25 PROCEDURE — 96375 TX/PRO/DX INJ NEW DRUG ADDON: CPT

## 2023-04-25 PROCEDURE — 84484 ASSAY OF TROPONIN QUANT: CPT | Performed by: EMERGENCY MEDICINE

## 2023-04-25 PROCEDURE — 85027 COMPLETE CBC AUTOMATED: CPT | Performed by: EMERGENCY MEDICINE

## 2023-04-25 PROCEDURE — 93005 ELECTROCARDIOGRAM TRACING: CPT

## 2023-04-25 PROCEDURE — 84100 ASSAY OF PHOSPHORUS: CPT | Performed by: EMERGENCY MEDICINE

## 2023-04-25 PROCEDURE — 63600175 PHARM REV CODE 636 W HCPCS: Performed by: EMERGENCY MEDICINE

## 2023-04-25 PROCEDURE — 80053 COMPREHEN METABOLIC PANEL: CPT | Performed by: EMERGENCY MEDICINE

## 2023-04-25 PROCEDURE — 25000003 PHARM REV CODE 250: Performed by: EMERGENCY MEDICINE

## 2023-04-25 PROCEDURE — 99285 PR EMERGENCY DEPT VISIT,LEVEL V: ICD-10-PCS | Mod: ,,, | Performed by: PHYSICIAN ASSISTANT

## 2023-04-25 PROCEDURE — 84145 PROCALCITONIN (PCT): CPT | Performed by: EMERGENCY MEDICINE

## 2023-04-25 PROCEDURE — 82010 KETONE BODYS QUAN: CPT | Performed by: PHYSICIAN ASSISTANT

## 2023-04-25 PROCEDURE — 83735 ASSAY OF MAGNESIUM: CPT | Performed by: EMERGENCY MEDICINE

## 2023-04-25 PROCEDURE — 85007 BL SMEAR W/DIFF WBC COUNT: CPT | Performed by: EMERGENCY MEDICINE

## 2023-04-25 RX ORDER — ONDANSETRON 2 MG/ML
4 INJECTION INTRAMUSCULAR; INTRAVENOUS
Status: COMPLETED | OUTPATIENT
Start: 2023-04-25 | End: 2023-04-25

## 2023-04-25 RX ADMIN — SODIUM CHLORIDE 1000 ML: 9 INJECTION, SOLUTION INTRAVENOUS at 11:04

## 2023-04-25 RX ADMIN — ONDANSETRON 4 MG: 2 INJECTION INTRAMUSCULAR; INTRAVENOUS at 10:04

## 2023-04-25 RX ADMIN — PIPERACILLIN SODIUM AND TAZOBACTAM SODIUM 4.5 G: 4; .5 INJECTION, POWDER, FOR SOLUTION INTRAVENOUS at 11:04

## 2023-04-25 RX ADMIN — DEXTROSE MONOHYDRATE 250 ML: 100 INJECTION, SOLUTION INTRAVENOUS at 10:04

## 2023-04-25 RX ADMIN — ONDANSETRON 4 MG: 2 INJECTION INTRAMUSCULAR; INTRAVENOUS at 11:04

## 2023-04-26 ENCOUNTER — PATIENT MESSAGE (OUTPATIENT)
Dept: HEMATOLOGY/ONCOLOGY | Facility: CLINIC | Age: 63
End: 2023-04-26
Payer: COMMERCIAL

## 2023-04-26 PROBLEM — F32.A DEPRESSION: Status: ACTIVE | Noted: 2023-04-26

## 2023-04-26 LAB
ALBUMIN SERPL BCP-MCNC: 3.7 G/DL (ref 3.5–5.2)
ALLENS TEST: ABNORMAL
ALP SERPL-CCNC: 48 U/L (ref 55–135)
ALT SERPL W/O P-5'-P-CCNC: 19 U/L (ref 10–44)
ANION GAP SERPL CALC-SCNC: 17 MMOL/L (ref 8–16)
ANISOCYTOSIS BLD QL SMEAR: SLIGHT
AST SERPL-CCNC: 48 U/L (ref 10–40)
B-OH-BUTYR BLD STRIP-SCNC: 5.8 MMOL/L (ref 0–0.5)
BASO STIPL BLD QL SMEAR: ABNORMAL
BASOPHILS # BLD AUTO: 0.03 K/UL (ref 0–0.2)
BASOPHILS # BLD AUTO: ABNORMAL K/UL (ref 0–0.2)
BASOPHILS NFR BLD: 0 % (ref 0–1.9)
BASOPHILS NFR BLD: 0.2 % (ref 0–1.9)
BILIRUB SERPL-MCNC: 0.7 MG/DL (ref 0.1–1)
BILIRUB UR QL STRIP: NEGATIVE
BUN SERPL-MCNC: 29 MG/DL (ref 8–23)
CALCIUM SERPL-MCNC: 7.5 MG/DL (ref 8.7–10.5)
CHLORIDE SERPL-SCNC: 98 MMOL/L (ref 95–110)
CLARITY UR REFRACT.AUTO: CLEAR
CO2 SERPL-SCNC: 22 MMOL/L (ref 23–29)
COLOR UR AUTO: YELLOW
CREAT SERPL-MCNC: 1.2 MG/DL (ref 0.5–1.4)
DIFFERENTIAL METHOD: ABNORMAL
DIFFERENTIAL METHOD: ABNORMAL
EOSINOPHIL # BLD AUTO: 0 K/UL (ref 0–0.5)
EOSINOPHIL # BLD AUTO: ABNORMAL K/UL (ref 0–0.5)
EOSINOPHIL NFR BLD: 0 % (ref 0–8)
EOSINOPHIL NFR BLD: 0.1 % (ref 0–8)
ERYTHROCYTE [DISTWIDTH] IN BLOOD BY AUTOMATED COUNT: 17.2 % (ref 11.5–14.5)
ERYTHROCYTE [DISTWIDTH] IN BLOOD BY AUTOMATED COUNT: 17.8 % (ref 11.5–14.5)
EST. GFR  (NO RACE VARIABLE): 50.5 ML/MIN/1.73 M^2
FERRITIN SERPL-MCNC: 64 NG/ML (ref 20–300)
GLUCOSE SERPL-MCNC: 90 MG/DL (ref 70–110)
GLUCOSE UR QL STRIP: NEGATIVE
HCO3 UR-SCNC: 28.9 MMOL/L (ref 24–28)
HCT VFR BLD AUTO: 31.3 % (ref 37–48.5)
HCT VFR BLD AUTO: 37.5 % (ref 37–48.5)
HGB BLD-MCNC: 11.6 G/DL (ref 12–16)
HGB BLD-MCNC: 9.6 G/DL (ref 12–16)
HGB UR QL STRIP: ABNORMAL
HYPOCHROMIA BLD QL SMEAR: ABNORMAL
IMM GRANULOCYTES # BLD AUTO: 0.06 K/UL (ref 0–0.04)
IMM GRANULOCYTES # BLD AUTO: ABNORMAL K/UL (ref 0–0.04)
IMM GRANULOCYTES NFR BLD AUTO: 0.3 % (ref 0–0.5)
IMM GRANULOCYTES NFR BLD AUTO: ABNORMAL % (ref 0–0.5)
IRON SERPL-MCNC: 94 UG/DL (ref 30–160)
KETONES UR QL STRIP: ABNORMAL
LACTATE SERPL-SCNC: 2.2 MMOL/L (ref 0.5–2.2)
LEUKOCYTE ESTERASE UR QL STRIP: ABNORMAL
LYMPHOCYTES # BLD AUTO: 9.1 K/UL (ref 1–4.8)
LYMPHOCYTES # BLD AUTO: ABNORMAL K/UL (ref 1–4.8)
LYMPHOCYTES NFR BLD: 52.4 % (ref 18–48)
LYMPHOCYTES NFR BLD: 65 % (ref 18–48)
MAGNESIUM SERPL-MCNC: 1.7 MG/DL (ref 1.6–2.6)
MCH RBC QN AUTO: 27.2 PG (ref 27–31)
MCH RBC QN AUTO: 27.2 PG (ref 27–31)
MCHC RBC AUTO-ENTMCNC: 30.7 G/DL (ref 32–36)
MCHC RBC AUTO-ENTMCNC: 30.9 G/DL (ref 32–36)
MCV RBC AUTO: 88 FL (ref 82–98)
MCV RBC AUTO: 89 FL (ref 82–98)
MICROSCOPIC COMMENT: NORMAL
MONOCYTES # BLD AUTO: 0.9 K/UL (ref 0.3–1)
MONOCYTES # BLD AUTO: ABNORMAL K/UL (ref 0.3–1)
MONOCYTES NFR BLD: 4 % (ref 4–15)
MONOCYTES NFR BLD: 5.4 % (ref 4–15)
NEUTROPHILS # BLD AUTO: 7.2 K/UL (ref 1.8–7.7)
NEUTROPHILS NFR BLD: 29 % (ref 38–73)
NEUTROPHILS NFR BLD: 41.6 % (ref 38–73)
NEUTS BAND NFR BLD MANUAL: 2 %
NITRITE UR QL STRIP: NEGATIVE
NRBC BLD-RTO: 0 /100 WBC
NRBC BLD-RTO: 0 /100 WBC
OSMOLALITY SERPL: 348 MOSM/KG (ref 275–295)
OVALOCYTES BLD QL SMEAR: ABNORMAL
PATH REV BLD -IMP: NORMAL
PCO2 BLDA: 57.4 MMHG (ref 35–45)
PH SMN: 7.31 [PH] (ref 7.35–7.45)
PH UR STRIP: 6 [PH] (ref 5–8)
PHOSPHATE SERPL-MCNC: 2.1 MG/DL (ref 2.7–4.5)
PHOSPHATE SERPL-MCNC: 3.2 MG/DL (ref 2.7–4.5)
PLATELET # BLD AUTO: 148 K/UL (ref 150–450)
PLATELET # BLD AUTO: 81 K/UL (ref 150–450)
PLATELET BLD QL SMEAR: ABNORMAL
PMV BLD AUTO: 10.2 FL (ref 9.2–12.9)
PMV BLD AUTO: 11.2 FL (ref 9.2–12.9)
PO2 BLDA: 51 MMHG (ref 40–60)
POC BE: 3 MMOL/L
POC SATURATED O2: 82 % (ref 95–100)
POC TCO2: 31 MMOL/L (ref 24–29)
POCT GLUCOSE: 131 MG/DL (ref 70–110)
POCT GLUCOSE: 98 MG/DL (ref 70–110)
POIKILOCYTOSIS BLD QL SMEAR: SLIGHT
POLYCHROMASIA BLD QL SMEAR: ABNORMAL
POTASSIUM SERPL-SCNC: 3.6 MMOL/L (ref 3.5–5.1)
PROCALCITONIN SERPL IA-MCNC: 0.66 NG/ML
PROT SERPL-MCNC: 6.1 G/DL (ref 6–8.4)
PROT UR QL STRIP: ABNORMAL
RBC # BLD AUTO: 3.53 M/UL (ref 4–5.4)
RBC # BLD AUTO: 4.26 M/UL (ref 4–5.4)
RBC #/AREA URNS AUTO: 1 /HPF (ref 0–4)
SAMPLE: ABNORMAL
SATURATED IRON: 25 % (ref 20–50)
SITE: ABNORMAL
SMUDGE CELLS BLD QL SMEAR: PRESENT
SODIUM SERPL-SCNC: 137 MMOL/L (ref 136–145)
SP GR UR STRIP: >1.03 (ref 1–1.03)
TOTAL IRON BINDING CAPACITY: 376 UG/DL (ref 250–450)
TRANSFERRIN SERPL-MCNC: 254 MG/DL (ref 200–375)
URN SPEC COLLECT METH UR: ABNORMAL
WBC # BLD AUTO: 17.37 K/UL (ref 3.9–12.7)
WBC # BLD AUTO: 29.23 K/UL (ref 3.9–12.7)
WBC #/AREA URNS AUTO: 2 /HPF (ref 0–5)

## 2023-04-26 PROCEDURE — 63600175 PHARM REV CODE 636 W HCPCS: Performed by: EMERGENCY MEDICINE

## 2023-04-26 PROCEDURE — 82010 KETONE BODYS QUAN: CPT | Performed by: EMERGENCY MEDICINE

## 2023-04-26 PROCEDURE — 63600175 PHARM REV CODE 636 W HCPCS: Performed by: STUDENT IN AN ORGANIZED HEALTH CARE EDUCATION/TRAINING PROGRAM

## 2023-04-26 PROCEDURE — 25000003 PHARM REV CODE 250: Performed by: EMERGENCY MEDICINE

## 2023-04-26 PROCEDURE — 99222 PR INITIAL HOSPITAL CARE,LEVL II: ICD-10-PCS | Mod: ,,, | Performed by: PSYCHIATRY & NEUROLOGY

## 2023-04-26 PROCEDURE — 63600175 PHARM REV CODE 636 W HCPCS: Performed by: INTERNAL MEDICINE

## 2023-04-26 PROCEDURE — 36415 COLL VENOUS BLD VENIPUNCTURE: CPT | Performed by: STUDENT IN AN ORGANIZED HEALTH CARE EDUCATION/TRAINING PROGRAM

## 2023-04-26 PROCEDURE — 85060 BLOOD SMEAR INTERPRETATION: CPT | Mod: ,,, | Performed by: PATHOLOGY

## 2023-04-26 PROCEDURE — 80053 COMPREHEN METABOLIC PANEL: CPT | Performed by: STUDENT IN AN ORGANIZED HEALTH CARE EDUCATION/TRAINING PROGRAM

## 2023-04-26 PROCEDURE — 82803 BLOOD GASES ANY COMBINATION: CPT

## 2023-04-26 PROCEDURE — 21400001 HC TELEMETRY ROOM

## 2023-04-26 PROCEDURE — 25000003 PHARM REV CODE 250: Performed by: STUDENT IN AN ORGANIZED HEALTH CARE EDUCATION/TRAINING PROGRAM

## 2023-04-26 PROCEDURE — 99900035 HC TECH TIME PER 15 MIN (STAT)

## 2023-04-26 PROCEDURE — 84425 ASSAY OF VITAMIN B-1: CPT | Performed by: STUDENT IN AN ORGANIZED HEALTH CARE EDUCATION/TRAINING PROGRAM

## 2023-04-26 PROCEDURE — 81001 URINALYSIS AUTO W/SCOPE: CPT | Performed by: EMERGENCY MEDICINE

## 2023-04-26 PROCEDURE — 99222 1ST HOSP IP/OBS MODERATE 55: CPT | Mod: ,,, | Performed by: PSYCHIATRY & NEUROLOGY

## 2023-04-26 PROCEDURE — 85060 PATHOLOGIST REVIEW: ICD-10-PCS | Mod: ,,, | Performed by: PATHOLOGY

## 2023-04-26 PROCEDURE — 83735 ASSAY OF MAGNESIUM: CPT | Performed by: STUDENT IN AN ORGANIZED HEALTH CARE EDUCATION/TRAINING PROGRAM

## 2023-04-26 PROCEDURE — 96361 HYDRATE IV INFUSION ADD-ON: CPT

## 2023-04-26 PROCEDURE — 27000207 HC ISOLATION

## 2023-04-26 PROCEDURE — 84466 ASSAY OF TRANSFERRIN: CPT | Performed by: STUDENT IN AN ORGANIZED HEALTH CARE EDUCATION/TRAINING PROGRAM

## 2023-04-26 PROCEDURE — 96375 TX/PRO/DX INJ NEW DRUG ADDON: CPT

## 2023-04-26 PROCEDURE — 82728 ASSAY OF FERRITIN: CPT | Performed by: STUDENT IN AN ORGANIZED HEALTH CARE EDUCATION/TRAINING PROGRAM

## 2023-04-26 PROCEDURE — 85025 COMPLETE CBC W/AUTO DIFF WBC: CPT | Performed by: STUDENT IN AN ORGANIZED HEALTH CARE EDUCATION/TRAINING PROGRAM

## 2023-04-26 PROCEDURE — 25500020 PHARM REV CODE 255: Performed by: EMERGENCY MEDICINE

## 2023-04-26 PROCEDURE — 90833 PR PSYCHOTHERAPY W/PATIENT W/E&M, 30 MIN (ADD ON): ICD-10-PCS | Mod: ,,, | Performed by: PSYCHIATRY & NEUROLOGY

## 2023-04-26 PROCEDURE — 84100 ASSAY OF PHOSPHORUS: CPT | Performed by: STUDENT IN AN ORGANIZED HEALTH CARE EDUCATION/TRAINING PROGRAM

## 2023-04-26 PROCEDURE — 90833 PSYTX W PT W E/M 30 MIN: CPT | Mod: ,,, | Performed by: PSYCHIATRY & NEUROLOGY

## 2023-04-26 RX ORDER — ONDANSETRON 2 MG/ML
4 INJECTION INTRAMUSCULAR; INTRAVENOUS EVERY 8 HOURS PRN
Status: DISCONTINUED | OUTPATIENT
Start: 2023-04-26 | End: 2023-04-30 | Stop reason: HOSPADM

## 2023-04-26 RX ORDER — LEVOTHYROXINE SODIUM 50 UG/1
50 TABLET ORAL
Status: DISCONTINUED | OUTPATIENT
Start: 2023-04-26 | End: 2023-04-30 | Stop reason: HOSPADM

## 2023-04-26 RX ORDER — MAGNESIUM SULFATE HEPTAHYDRATE 40 MG/ML
2 INJECTION, SOLUTION INTRAVENOUS ONCE
Status: COMPLETED | OUTPATIENT
Start: 2023-04-26 | End: 2023-04-26

## 2023-04-26 RX ORDER — SODIUM CHLORIDE 0.9 % (FLUSH) 0.9 %
10 SYRINGE (ML) INJECTION
Status: DISCONTINUED | OUTPATIENT
Start: 2023-04-26 | End: 2023-04-30 | Stop reason: HOSPADM

## 2023-04-26 RX ORDER — SODIUM,POTASSIUM PHOSPHATES 280-250MG
2 POWDER IN PACKET (EA) ORAL
Status: COMPLETED | OUTPATIENT
Start: 2023-04-26 | End: 2023-04-26

## 2023-04-26 RX ORDER — ARIPIPRAZOLE 5 MG/1
5 TABLET ORAL DAILY
Status: DISCONTINUED | OUTPATIENT
Start: 2023-04-27 | End: 2023-04-30 | Stop reason: HOSPADM

## 2023-04-26 RX ORDER — GLUCAGON 1 MG
1 KIT INJECTION
Status: DISCONTINUED | OUTPATIENT
Start: 2023-04-26 | End: 2023-04-30 | Stop reason: HOSPADM

## 2023-04-26 RX ORDER — DULOXETIN HYDROCHLORIDE 60 MG/1
60 CAPSULE, DELAYED RELEASE ORAL DAILY
Status: DISCONTINUED | OUTPATIENT
Start: 2023-04-27 | End: 2023-04-30 | Stop reason: HOSPADM

## 2023-04-26 RX ORDER — POTASSIUM CHLORIDE 20 MEQ/1
20 TABLET, EXTENDED RELEASE ORAL ONCE
Status: COMPLETED | OUTPATIENT
Start: 2023-04-26 | End: 2023-04-26

## 2023-04-26 RX ORDER — LORAZEPAM 2 MG/ML
2 INJECTION INTRAMUSCULAR EVERY 4 HOURS PRN
Status: DISCONTINUED | OUTPATIENT
Start: 2023-04-26 | End: 2023-04-30 | Stop reason: HOSPADM

## 2023-04-26 RX ORDER — DIAZEPAM 5 MG/1
10 TABLET ORAL EVERY 8 HOURS
Status: DISCONTINUED | OUTPATIENT
Start: 2023-04-26 | End: 2023-04-29

## 2023-04-26 RX ORDER — FOLIC ACID 1 MG/1
1 TABLET ORAL DAILY
Status: DISCONTINUED | OUTPATIENT
Start: 2023-04-26 | End: 2023-04-30 | Stop reason: HOSPADM

## 2023-04-26 RX ORDER — LISINOPRIL 20 MG/1
20 TABLET ORAL DAILY
Status: DISCONTINUED | OUTPATIENT
Start: 2023-04-26 | End: 2023-04-30 | Stop reason: HOSPADM

## 2023-04-26 RX ORDER — DEXTROSE 40 %
30 GEL (GRAM) ORAL
Status: DISCONTINUED | OUTPATIENT
Start: 2023-04-26 | End: 2023-04-30 | Stop reason: HOSPADM

## 2023-04-26 RX ORDER — LORAZEPAM 2 MG/ML
1 INJECTION INTRAMUSCULAR
Status: COMPLETED | OUTPATIENT
Start: 2023-04-26 | End: 2023-04-26

## 2023-04-26 RX ORDER — THIAMINE HCL 100 MG
100 TABLET ORAL DAILY
Status: DISCONTINUED | OUTPATIENT
Start: 2023-04-26 | End: 2023-04-30 | Stop reason: HOSPADM

## 2023-04-26 RX ORDER — TALC
6 POWDER (GRAM) TOPICAL NIGHTLY PRN
Status: DISCONTINUED | OUTPATIENT
Start: 2023-04-26 | End: 2023-04-30 | Stop reason: HOSPADM

## 2023-04-26 RX ORDER — PROCHLORPERAZINE EDISYLATE 5 MG/ML
2.5 INJECTION INTRAMUSCULAR; INTRAVENOUS EVERY 6 HOURS PRN
Status: DISCONTINUED | OUTPATIENT
Start: 2023-04-26 | End: 2023-04-30 | Stop reason: HOSPADM

## 2023-04-26 RX ORDER — TRAZODONE HYDROCHLORIDE 100 MG/1
200 TABLET ORAL NIGHTLY
Status: DISCONTINUED | OUTPATIENT
Start: 2023-04-26 | End: 2023-04-30 | Stop reason: HOSPADM

## 2023-04-26 RX ORDER — DEXTROSE 40 %
15 GEL (GRAM) ORAL
Status: DISCONTINUED | OUTPATIENT
Start: 2023-04-26 | End: 2023-04-30 | Stop reason: HOSPADM

## 2023-04-26 RX ORDER — ACETAMINOPHEN 325 MG/1
650 TABLET ORAL EVERY 4 HOURS PRN
Status: DISCONTINUED | OUTPATIENT
Start: 2023-04-26 | End: 2023-04-30 | Stop reason: HOSPADM

## 2023-04-26 RX ORDER — NALOXONE HCL 0.4 MG/ML
0.02 VIAL (ML) INJECTION
Status: DISCONTINUED | OUTPATIENT
Start: 2023-04-26 | End: 2023-04-30 | Stop reason: HOSPADM

## 2023-04-26 RX ORDER — TRAZODONE HYDROCHLORIDE 100 MG/1
300 TABLET ORAL NIGHTLY
Status: DISCONTINUED | OUTPATIENT
Start: 2023-04-26 | End: 2023-04-26

## 2023-04-26 RX ORDER — SODIUM CHLORIDE 0.9 % (FLUSH) 0.9 %
10 SYRINGE (ML) INJECTION EVERY 12 HOURS PRN
Status: DISCONTINUED | OUTPATIENT
Start: 2023-04-26 | End: 2023-04-30 | Stop reason: HOSPADM

## 2023-04-26 RX ORDER — CHLORDIAZEPOXIDE HYDROCHLORIDE 25 MG/1
50 CAPSULE, GELATIN COATED ORAL 3 TIMES DAILY
Status: DISCONTINUED | OUTPATIENT
Start: 2023-04-26 | End: 2023-04-26

## 2023-04-26 RX ADMIN — ONDANSETRON 4 MG: 2 INJECTION INTRAMUSCULAR; INTRAVENOUS at 03:04

## 2023-04-26 RX ADMIN — ACETAMINOPHEN 650 MG: 325 TABLET ORAL at 05:04

## 2023-04-26 RX ADMIN — IOHEXOL 100 ML: 350 INJECTION, SOLUTION INTRAVENOUS at 12:04

## 2023-04-26 RX ADMIN — POTASSIUM & SODIUM PHOSPHATES POWDER PACK 280-160-250 MG 2 PACKET: 280-160-250 PACK at 11:04

## 2023-04-26 RX ADMIN — POTASSIUM CHLORIDE 20 MEQ: 1500 TABLET, EXTENDED RELEASE ORAL at 09:04

## 2023-04-26 RX ADMIN — POTASSIUM & SODIUM PHOSPHATES POWDER PACK 280-160-250 MG 2 PACKET: 280-160-250 PACK at 09:04

## 2023-04-26 RX ADMIN — LEVOTHYROXINE SODIUM 50 MCG: 50 TABLET ORAL at 05:04

## 2023-04-26 RX ADMIN — MAGNESIUM SULFATE 2 G: 2 INJECTION INTRAVENOUS at 09:04

## 2023-04-26 RX ADMIN — LISINOPRIL 20 MG: 20 TABLET ORAL at 09:04

## 2023-04-26 RX ADMIN — DEXTROSE AND SODIUM CHLORIDE 500 ML: 5; .9 INJECTION, SOLUTION INTRAVENOUS at 05:04

## 2023-04-26 RX ADMIN — LORAZEPAM 1 MG: 2 INJECTION INTRAMUSCULAR; INTRAVENOUS at 01:04

## 2023-04-26 RX ADMIN — POTASSIUM & SODIUM PHOSPHATES POWDER PACK 280-160-250 MG 2 PACKET: 280-160-250 PACK at 06:04

## 2023-04-26 RX ADMIN — VANCOMYCIN HYDROCHLORIDE 1000 MG: 1 INJECTION, POWDER, LYOPHILIZED, FOR SOLUTION INTRAVENOUS at 01:04

## 2023-04-26 RX ADMIN — DIAZEPAM 10 MG: 5 TABLET ORAL at 09:04

## 2023-04-26 RX ADMIN — POTASSIUM & SODIUM PHOSPHATES POWDER PACK 280-160-250 MG 2 PACKET: 280-160-250 PACK at 04:04

## 2023-04-26 RX ADMIN — CHLORDIAZEPOXIDE HYDROCHLORIDE 50 MG: 25 CAPSULE ORAL at 09:04

## 2023-04-26 RX ADMIN — ONDANSETRON 4 MG: 2 INJECTION INTRAMUSCULAR; INTRAVENOUS at 11:04

## 2023-04-26 RX ADMIN — ACETAMINOPHEN 650 MG: 325 TABLET ORAL at 09:04

## 2023-04-26 RX ADMIN — TRAZODONE HYDROCHLORIDE 200 MG: 100 TABLET ORAL at 09:04

## 2023-04-26 RX ADMIN — SODIUM CHLORIDE 1000 ML: 0.9 INJECTION, SOLUTION INTRAVENOUS at 12:04

## 2023-04-26 RX ADMIN — FOLIC ACID 1 MG: 1 TABLET ORAL at 09:04

## 2023-04-26 RX ADMIN — PROCHLORPERAZINE EDISYLATE 2.5 MG: 5 INJECTION INTRAMUSCULAR; INTRAVENOUS at 04:04

## 2023-04-26 RX ADMIN — PROCHLORPERAZINE EDISYLATE 2.5 MG: 5 INJECTION INTRAMUSCULAR; INTRAVENOUS at 05:04

## 2023-04-26 RX ADMIN — DULOXETINE 90 MG: 30 CAPSULE, DELAYED RELEASE ORAL at 09:04

## 2023-04-26 RX ADMIN — Medication 100 MG: at 09:04

## 2023-04-26 RX ADMIN — THIAMINE HYDROCHLORIDE 100 MG: 100 INJECTION, SOLUTION INTRAMUSCULAR; INTRAVENOUS at 04:04

## 2023-04-26 RX ADMIN — CHLORDIAZEPOXIDE HYDROCHLORIDE 50 MG: 25 CAPSULE ORAL at 03:04

## 2023-04-26 RX ADMIN — LORAZEPAM 2 MG: 2 INJECTION INTRAMUSCULAR; INTRAVENOUS at 03:04

## 2023-04-26 NOTE — ASSESSMENT & PLAN NOTE
Continue CIWA-Ar monitoring  Given thiamine 100mg IV x 1 and start on daily thiamine, folic acid  Start on librium 50mg q8hr x 4 doses, then 25mg q6hr PRN  Ativan 1mg IV PRN for breakthru withdrawal symptoms  Monitor for seizure like activity

## 2023-04-26 NOTE — ASSESSMENT & PLAN NOTE
Patient reports watery diarrhea for the past week  Elevated WBC but lymphocytic predominance, in the setting of CLL differential includes Cdiff as well as CLL  Ordered C diff EIA, results pending  Will monitor symptoms of diarrhea, will treat symptomatically if negative for C Diff

## 2023-04-26 NOTE — CONSULTS
"      INITIAL VISIT: ADDICTION PSYCHIATRY CONSULTATION SERVICE      ASSESSMENT AND PLAN:     DIAGNOSES & PROBLEMS:  Alcohol use disorder, severe  Tobacco use disorder  Depression, unspecified    In Summary:  Pt is a 62 y.o F w/ PMH of tobacco use, alcohol abuse, and depression presenting due to c/o nausea, vomiting, diarrhea. Pt states she's been drinking vodka daily for many years off and on. She reports withdrawal symptoms with c/o n/v, weakness, shaking. She does have a hx of complicated withdrawals and seizures. Had a period of sobriety for 6 months prior to relapsing roughly 3 weeks ago because of marital conflicts. She has been "in and out of" rehab, detox, and AA meetings and is interested in attempting to quit again, open to outpatient rehab. Also states she's been smoking for many years off, usually cigarettes <1/2 ppd or vaping. She does not feel ready to quit tobacco at this time. Pt used to see a psychiatrist for her depression but stopped because "it's expensive" but is willing to go back. Would recommend restarting home meds and continuing benzo taper with alcohol withdrawals.    Plan:  - continue benzo taper per primary - librium or valium recommended  - PRN ativan per CIWA protocol   - continue home cymbalta 60, abilify 5, trazodone 200  - pt counseled on substance cessation, resources provided       - continue alcohol withdrawal protocol while patient is in house, including supportive measures,frequent monitoring of vitals and CIWA-Ar, and initiation of PRN +/- standing benzodiazepines to minimize risk of complicated withdrawal  - patient counseled on abstinence from alcohol and substances of abuse (illicit and prescription)  - counseled on full engagement in 12 step (or equivalent) recovery program(s), including acquisition of a sponsor  - addiction resource sheet provided to patient  - relapse prevention and motivational interviewing provided      PRESENTATION:     Earl Abdul presents with " "the following chief complaint: alcohol and/or drug addiction    Per Chart:  Earl Abdul is a 62 year old female with a past medical history of tobacco use, alcohol abuse, depression with suicidal attempt (2017), history of breast cancer, CLL (dx on 6/2022, not on treatment), fatty liver disease.  She presented to the ER with nausea and vomiting for several days per ER.  She was discharged from her previous hospitalization about 3 weeks ago and states that she stopped drinking for about a week, and then began drinking again about 2 weeks prior to admission.  Her last drink was at 10 AM the day of admission.  She also continues to smoke cigarettes.  She reports abdominal pain, nausea, vomiting, diarrhea, and shortness of breath.  Denies any fevers.  Further history and ROS is limited due to patient uncooperative with exam and sleeping.        Upon chart review she has a history of significant alcohol withdrawal symptoms including seizures that the patient reports.  In the ER she was noted to be actively vomiting.  She appeared ill and short of breath, placed on nasal cannula.  She was tachycardic to the 120s, sinus tach.  Labs remarkable for a large anion gap of 28.  Patient with an DEVANTE, and hypoglycemic to the 50s on chemistry.  Alcohol level of 86, BOHB level of 7.  WBC elevated to 29K with a lymphocytic predominance.       She is admitted to hospital medicine for monitoring for alcohol withdrawals.         Per Patient:  Pt is a 62 y.o F w/ PMH of tobacco use, alcohol abuse, and depression presenting due to c/o nausea, vomiting, diarrhea. Pt appeared to be in discomfort and pain attributed to her complaints. She's been drinking for "many years off and on" and usually drinks 2-3 6 oz glasses of vodka daily. Had a period of sobriety for 6 months prior to relapsing roughly 3 weeks ago because of marital conflicts. She feels as though she is having withdrawal symptoms today 04/26 with c/o n/v, weakness, shaking. She " "has had a hx of complicated withdrawals with seizures but does not feel that coming at this time. She has been "in and out of" rehab, detox, and AA meetings and is agreeable in attempting to quit again. Did not enjoy AA meetings and does not wish to go back, but is open to outpatient rehab. Also states she's been smoking for many years off, usually cigarettes <1/2 ppd or vaping. Had her e-cigarette in her hands when being interviewed. She does not feel ready to quit tobacco at this time. Pt used to see a psychiatrist for her depression but stopped because "it's expensive." She takes Cymbalta, Abilify for it but says that the medications are not working that well. She endorses feeling more depressed recently due to marital problems and her recent awareness of her CLL diagnosis. She says that her son is a busy  and her  is not answering her calls, but she can rely on her sister. Denies any SI/HI/AVH and any access to guns.     Pt told similar story to resident, said she was sober for 6 mo prior to relapse ~3 weeks ago due to issues with her  who just left her, has been drinking 1/2 bottle of vodka daily. Said she's currently having some withdrawal symptoms of feeling shaky, nausea, diarrhea, but feels her tremors are better. Reported hx of seizures previously. Said that she's been more depressed recently due to separation from , thinking about the son she lost 3 years ago due to OD, and also processing her cancer diagnosis. Said she does have support of sister and would like to go back to psychiatry. Unsure of efficacy of current meds but would like to continue them at this time. Denied previous SA, current SI/HI/AVH.      Collateral:   NA      REVIEW OF SYSTEMS:  I[]I Patient denies any pertinent ROS, and none is known.  I[]I Patient unable or unwilling to provide any ROS.    [x] Y  [] N  sleep disturbance: **  "off and on sleeping"  [x] Y  [] N  appetite/weight change: **  Positive " "for: decreased appetite "not been eating"  [x] Y  [] N  fatigue/anergia: **   [x] Y  [] N  impairment in focus/concentration: ** a little bit    [x] Y  [] N  depression: **  getting worse because of marital problems  [x] Y  [] N  anxiety/worry: ** "a little bit"   [] Y  [x] N  dysregulated mood/behavior: **     [] Y  [x] N  manic symptomatology: **     [] Y  [x] N  psychosis: **           A pertinent medical review of systems was performed with the following notable findings: trouble with sleeping and poor appetite likely due to n/v/diarrhea, social stressors causing depressed mood     CURRENT PSYCHOTROPIC REGIMEN:  I[x]I Y  I[]I N  I[]I U    Cymbalta 60 daily  Trazodone 200 qhs  Abilify 5 daily    ADDICTION:     I[x]I Y  I[]I N  I[]I U  I[x]I Current  I[]I Former  Nicotine Use: Cigarettes <1/2 ppd, and vaping   I[x]I Y  I[]I N  I[]I U  I[x]I Current  I[]I Former  Alcohol Use:   I[x]I Y  I[]I N  I[]I U  I[x]I Current  I[]I Former  Alcohol Misuse/Abuse: did not want to specify but said "drinking for years" "off and on",   I[x]I Y  I[]I N  I[]I U  I[x]I Current  I[]I Former  Illicit Drug Use/Misuse/Abuse:   I[]I Y  I[]I N  I[x]I U  I[]I Current  I[]I Former  Misuse/Abuse of Rx Medications:   I[x]I Cannabis  I[]I Cocaine  I[]I Heroin  I[]I Meth  I[]I Opioids  I[]I Stimulants  I[]I Benzos  I[]I Other:     I[]I N/A  I[]I U  Substance(s) of Choice: vodka, nicotine, occasional marijuana  I[]I N/A  I[]I U  Substance(s) Used Currently/Recently: vodka, nicotine   I[]I N/A  I[]I U  Alcohol Consumption: daily or near daily approximately 2-3 6 oz drinks of vodka daily, said half bottle to resident  I[]I N/A  I[]I U  Last Drink: 10 am 04/25  I[]I N/A  I[]I U  Last Drug Use: "a long time ago"  I[]I N/A  I[]I U  Duration of Sobriety/Abstinence: "off and on"    I[x]I Y  I[]I N  I[]I U  Hx of Detox:   I[x]I Y  I[]I N  I[]I U  Hx of Rehab:   I[]I Y  I[x]I N  I[]I U  Hx of IVDU:   I[]I Y  I[x]I N  I[]I U  Hx " "of Accidental Overdose:   I[]I Y  I[x]I N  I[]I U  Hx of DUI:   I[x]I Y  I[]I N  I[]I U  Hx of Complicated Withdrawal (i.e. Seizures and/or Delirium Tremens):   I[]I Y  I[x]I N  I[]I U  Hx of Known/Suspected Substance-Induced Psychiatric Disorder:   I[]I Y  I[]I N  I[x]I U  Hx of Medication Assisted Treatment:   I[x]I Y  I[]I N  I[]I U  Hx of Twelve Step Program (or Equivalent) Involvement:   I[]I Y  I[x]I N  I[]I U  Currently Exhibits Signs of Intoxication:   I[x]I Y  I[]I N  I[]I U  Currently Exhibits Signs of Withdrawal:   I[]I Y  I[x]I N  I[]I U  Currently Active in Recovery:   I[x]I Y  I[]I N  I[]I U  Social Support: sister   I[]I Y  I[x]I N  I[]I U  Spouse/Partner Consumption:     I[]I N/A  I[x]I Y  I[]I N  I[]I U  Acknowledges/Accepts Addiction:   I[]I N/A  I[x]I Y  I[]I N  I[]I U  Advised to Quit/Cut Back:   I[]I N/A  I[x]I Y  I[]I N  I[]I U  Alcohol/Drug Cessation ("Wants to Quit"): Interested in Quitting  I[]I N/A  I[x]I Y  I[]I N  I[]I U  Motivation to Pursue Treatment: Moderate  I[]I N/A  I[]I Y  I[x]I N  I[]I U  Tobacco Cessation ("Wants to Quit"): uninterested in quitting or cutting back    DSM-5-TR SUBSTANCE USE DISORDER CRITERIA:     -- Impaired Control:  I[x]I Y  I[]I N  I[]I U  I[]I A  I[]I D  Often take in larger amounts or over a longer period of time than was intended:   I[x]I Y  I[]I N  I[]I U  I[]I A  I[]I D  Persistent desire or unsuccessful efforts to cut down or control use:   I[]I Y  I[]I N  I[x]I U  I[]I A  I[]I D  Great deal of time spent in activities necessary to obtain substance, use, or recover from effects:   I[x]I Y  I[]I N  I[]I U  I[]I A  I[]I D  Craving/strong desire for substance or urge to use:   -- Social Impairment:  I[]I Y  I[]I N  I[x]I U  I[]I A  I[]I D  Use resulting in failure to fulfill major role obligations at home, work or school:   I[]I Y  I[]I N  I[x]I U  I[]I A  I[]I D  Social, occupational, recreational activities decreased because of use:   I[]I Y  I[]I N  " I[x]I U  I[]I A  I[]I D  Continued use despite having persistent or recurrent social or interpersonal problems caused or exacerbated by the substance:   -- Risky Use:  I[]I Y  I[]I N  I[x]I U  I[]I A  I[]I D  Recurrent use in situations in which it is physically hazardous:   I[]I Y  I[]I N  I[x]I U  I[]I A  I[]I D  Use despite physical or psychological problems that are likely to have been caused or exacerbated by the substance:   -- Neuroadaptation:  I[x]I Y  I[]I N  I[]I U  I[]I A  I[]I D  Tolerance, as defined by either of the following: (1) a need for markedly increased amounts of substance to achieve intoxication or desired effect.  -OR- (2) a markedly diminished effect with continued use of the same amount of substance:   I[x]I Y  I[]I N  I[]I U  I[]I A  I[]I D  Withdrawal, as manifested by either of the following: (1) the characteristic withdrawal syndrome for substance.  -OR- (2) substance is taken to relieve or avoid withdrawal symptoms:   -- Mild (1-3), Moderate (4-5), Severe (?6)    I[]I N/A  I[x]I Y  I[]I N  I[]I U  I[]I A  I[]I D  Active Substance Use Disorder:       HISTORY:     I[]I Patient denies any history, and none is known.  I[]I Patient unable or unwilling to provide any history.    I[x]I Y  I[]I N  I[]I U  Psychiatric Diagnoses: depression, anxiety  I[]I Y  I[x]I N  I[]I U  Current Psychiatric Provider (if Applicable):   I[]I Y  I[x]I N  I[]I U  Hx of Psychiatric Hospitalization:   I[x]I Y  I[]I N  I[]I U  Hx of Outpatient Psychiatric Treatment (psychiatry/psychotherapy):   I[]I Y  I[]I N  I[]I U  Psychotropic Trials: Abilify, Cymbalta, trazodone, pristiq  I[x]I Y  I[]I N  I[]I U  Prior Suicide Attempts: 2017  I[x]I Y  I[]I N  I[]I U  Hx of Suicidal Ideation:   I[]I Y  I[x]I N  I[]I U  Hx of Homicidal Ideation:   I[]I Y  I[x]I N  I[]I U  Hx of Self-Injurious Behavior (Non-Suicidal):   I[]I Y  I[x]I N  I[]I U  Hx of Violence:   I[]I Y  I[x]I N  I[]I U  Documented Hx of Malingering:     FAMILY  HISTORY:  I[]I Y  I[x]I N  I[]I U        I[]I Y  I[x]I N  I[]I U  Hx of Trauma/Neglect:   I[]I Y  I[x]I N  I[]I U  Hx of Physical Abuse:   I[]I Y  I[x]I N  I[]I U  Hx of Sexual Abuse:   I[x]I Y  I[]I N  I[]I U  Grew Up Locally?:   I[x]I Y  I[]I N  I[]I U  Happy Childhood?:   I[]I Y  I[x]I N  I[]I U  Significant Developmental Delay/Disability?:   I[x]I Y  I[]I N  I[]I U  GED/High School Dipoloma?:   I[]I Y  I[x]I N  I[]I U  Post High School Education?:   I[]I Y  I[]I N  I[x]I U  Currently Employed?:   I[]I Y  I[]I N  I[x]I U  On or Applying for Disability?:   I[x]I Y  I[]I N  I[]I U  Functions Independently?:   I[x]I Y  I[]I N  I[]I U  Financially Stable?:   I[x]I Y  I[]I N  I[]I U  Domiciled?:   I[x]I Y  I[]I N  I[]I U  Lives Alone?:   I[x]I Y  I[]I N  I[]I U  Heterosexual/Cisgender?:   I[]I Y  I[x]I N  I[]I U  Currently in a Romantic Relationship?:   I[x]I Y  I[]I N  I[]I U  Ever ?: just   I[x]I Y  I[]I N  I[]I U  Children/Dependents?: 2 - 1    I[x]I Y  I[]I N  I[]I U  Yazidism/Spiritual?:   I[]I Y  I[x]I N  I[]I U   History?:   I[]I Y  I[x]I N  I[]I U  Current Legal Issues:   I[]I Y  I[x]I N  I[]I U  Past Charges/Convictions:   I[]I Y  I[x]I N  I[]I U  Hx of Incarceration:   I[]I Y  I[]I N  I[x]I U  Engaged in Hobbies/Recreational Activities?:   I[]I Y  I[x]I N  I[]I U  Access to a Gun?:   I[x]I Y  I[]I N  I[]I U  Hx of Seizure: with withdrawals  I[]I Y  I[]I N  I[x]I U  Hx of Significant Head Trauma (e.g., Loss of Consciousness, Concussion, Coma):    I[x]I Y  I[]I N  I[]I U  Medical History & Diagnoses:       The patient's past medical history has been reviewed and updated as appropriate within the electronic medical record system.    Alcohol withdrawal    Diarrhea    History of substance abuse    Alcoholic ketoacidosis    CLL (chronic lymphocytic leukemia)     Scheduled and PRN Medications: The electronic chart was reviewed and updated as appropriate.  See Medcard for  details.    Current Facility-Administered Medications:     acetaminophen tablet 650 mg, 650 mg, Oral, Q4H PRN, Kevin Merino MD, 650 mg at 04/26/23 0538    [START ON 4/27/2023] ARIPiprazole tablet 5 mg, 5 mg, Oral, Daily, Gallito Crenshaw,     dextrose 10% bolus 125 mL 125 mL, 12.5 g, Intravenous, PRN, Kevin Merino MD    dextrose 10% bolus 250 mL 250 mL, 25 g, Intravenous, PRN, Kevin Merino MD    dextrose 40 % gel 15,000 mg, 15 g, Oral, PRN, Kevin Merino MD    dextrose 40 % gel 30,000 mg, 30 g, Oral, PRN, Kevin Merino MD    diazePAM tablet 10 mg, 10 mg, Oral, Q8H, Gallito Crenshaw DO    [START ON 4/27/2023] DULoxetine DR capsule 60 mg, 60 mg, Oral, Daily, Gallito Crenshaw DO    folic acid tablet 1 mg, 1 mg, Oral, Daily, Kevin Merino MD, 1 mg at 04/26/23 0917    glucagon (human recombinant) injection 1 mg, 1 mg, Intramuscular, PRN, Kevin Merino MD    levothyroxine tablet 50 mcg, 50 mcg, Oral, Before breakfast, Kevin Merino MD, 50 mcg at 04/26/23 0538    lisinopriL tablet 20 mg, 20 mg, Oral, Daily, Kevin Merino MD, 20 mg at 04/26/23 0917    LORazepam injection 2 mg, 2 mg, Intravenous, Q4H PRN, Gallito Crenshaw DO, 2 mg at 04/26/23 1536    melatonin tablet 6 mg, 6 mg, Oral, Nightly PRN, Mary Hendricks PA-C    naloxone 0.4 mg/mL injection 0.02 mg, 0.02 mg, Intravenous, PRN, Kevin Merino MD    ondansetron injection 4 mg, 4 mg, Intravenous, Q8H PRN, Kevin Merino MD, 4 mg at 04/26/23 1143    potassium, sodium phosphates 280-160-250 mg packet 2 packet, 2 packet, Oral, QID (AC & HS), Kevin Merino MD, 2 packet at 04/26/23 1627    prochlorperazine injection Soln 2.5 mg, 2.5 mg, Intravenous, Q6H PRN, Sadi Rivera MD, 2.5 mg at 04/26/23 1627    sodium chloride 0.9% flush 10 mL, 10 mL, Intravenous, PRN, Mary Hendricks PA-C    sodium chloride 0.9% flush 10 mL, 10 mL, Intravenous, Q12H PRN, Kevin Merino MD    thiamine tablet 100 mg, 100 mg, Oral, Daily, Kevin Merino MD, 100 mg at 04/26/23 0918    traZODone tablet 200  "mg, 200 mg, Oral, QHS, Gallito Crenshaw, DO    Facility-Administered Medications Ordered in Other Encounters:     albuterol sulfate nebulizer solution 2.5 mg, 2.5 mg, Nebulization, Once, Andrew Rodriguez MD    Allergies:  Lortab [hydrocodone-acetaminophen] and Promethazine    PSYCHOSOCIAL FACTORS:  Stressors (Biopsychosocial, Cultural and Environmental): marriage, mental health, physical health  Functioning Relationships: strained with spouse or significant others and alone & isolated    STRENGTHS AND LIABILITIES:   Strength: Patient is expressive/articulate.  Strength: Patient is accepting of treatment.  Liability: Patient has poor or no support network.    Additional Relevant History, As Applicable:       EXAMINATION:     BP (!) 155/58 (BP Location: Left arm, Patient Position: Lying)   Pulse 95   Temp 98 °F (36.7 °C)   Resp 16   Ht 5' 7" (1.702 m)   Wt 78.3 kg (172 lb 9.9 oz)   SpO2 99%   Breastfeeding No   BMI 27.04 kg/m²     MENTAL STATUS EXAMINATION:  General Appearance: **   adequately groomed, appropriately dressed, in no apparent distress  Behavior: **   cooperative, under good behavioral control  Involuntary Movements and Motor Activity: **   +tremors  Gait and Station: **   unable to assess - patient lying down or seated  Speech and Language: **   conversational, spontaneous, speaks and understands English proficiently  Mood: "very somber"  Affect: **   reactive, mood congruent  Thought Process and Associations: **   linear and goal-directed, with no loosening of associations  Thought Content and Perceptions: **   no suicidal or homicidal ideation, no evidence of psychosis  Sensorium: **   alert and oriented, with clear sensorium  Recent and Remote Memory: **   grossly intact, no significant impairments noted  Attention and Concentration: **   attentive, not readily distractible  Fund of Knowledge: **   grossly intact, used appropriate vocabulary, no significant deficits noted  Insight: **   " intact, demonstrates awareness of illness  Judgment: **   intact, behavior is adequate/appropriate given the circumstances      RISK MANAGEMENT:     The following risk parameters were assessed during this evaluation:    I[]I Y  I[x]I N  I[]I U  I[]I A  Suicidal Ideation/Behavior: **   I[]I Y  I[x]I N  I[]I U  I[]I A  Homicidal Ideation/Behavior: **  I[]I Y  I[x]I N  I[]I U  I[]I A  Violence: **  I[]I Y  I[x]I N  I[]I U  I[]I A  Self-Injurious Behavior: **    The patient is deemed to be a reliable and factually accurate historian.    I[]I Y  I[x]I N  I[]I U  I[]I A  I[]I N/A  Minimization of Risk Parameters Suspected/Evident: **  I[]I Y  I[x]I N  I[]I U  I[]I A  I[]I N/A  Exaggeration of Risk Parameters Suspected/Evident: **      [] Y  [x] N  Danger to Self:   [] Y  [x] N  Danger to Others:   [] Y  [x] N  Grave Disability:     The patient does not currently meet the criteria for psychiatric admission and can be safely and effectively managed in a less restrictive level of care.    In cases of emergency, daily coverage provided by Acute/ER Psych MD, NP, PA, or SW, with contact numbers located in [facility] On Call Schedule.    Jami Meraz MS3  Francesco Boyle MD  Department of Psychiatry  [facility]        KEY:     I[]I Y = Yes / Present / Endorses  I[]I N = No / Absent / Denies  I[]I U = Unknown / Unable to Assess / Unwilling to Participate  I[]I A = Ambiguity Exists / Accuracy Uncertain  I[]I D = Denial or Minimization is Suspected/Evident  I[]I N/A = Non-Applicable    CHART REVIEW:     Available documentation has been reviewed, and pertinent elements of the chart have been incorporated into this evaluation where appropriate.    The patient's last Epic encounter in the psychiatry department was on: Visit date not found  The patient's first Epic encounter in the psychiatry department was on:      LA/MS  [facility]  Site reviewed - No recent discrepancies or irregularities are noted.      ADVICE AND  COUNSELING:     [x] In cases of emergencies (e.g. SI/HI resulting in danger to self or others, functioning deteriorates to the level of grave disability), call 911 or [phone], or present to the emergency department for immediate assistance.  [x] Patient should not operate a motor vehicle or heavy machinery if effects of medications or underlying symptoms/condition impair the ability to safely do so.    Alcohol, Tobacco, and Drug Counseling, as well as resources, has been provided, as warranted.     Shared medical decision making and informed consent are the hallmark and bedrock of good clinical care, and as such have been employed and obtained, respectively, to the degree possible.      Risk Mitigation Strategies, Harm Reduction Techniques, and Safety Netting are important interventions that can reduce acute and chronic risk, and as such have been employed to the degree possible.    Prescription Drug Management entails the review, recommendation, or consideration without recommendation of medications, and as such was employed during the encounter.    Additional Psychoeducation has been provided, as warranted.    Discussed, to the extent possible, diagnosis, risks and benefits of proposed treatment vs alternative treatments vs no treatment, potential side effects of these treatments and the inherent unpredictability of treatment. The patient's ability to understand, participate and engage in a conversation surrounding this was deemed to be: adequate.     Written material has been provided to supplement, augment, and reinforce any discussions and interventions, via the AVS or other pre-printed handouts, as warranted.      DIAGNOSTIC TESTING:     The chart was reviewed for recent diagnostic procedures and investigations, and pertinent results are noted below.    Wt Readings from Last 2 Encounters:   04/26/23 78.3 kg (172 lb 9.9 oz)   04/06/23 81.7 kg (180 lb 1.9 oz)     BP Readings from Last 1 Encounters:   04/26/23 (!)  155/58     Pulse Readings from Last 1 Encounters:   04/26/23 95        Blood Counts, Electrolytes & Glucose: (i.e. WBC, ANC, Hemoglobin, Hematocrit, MCV, Platelets)  Lab Results   Component Value Date    WBC 17.37 (H) 04/26/2023    GRAN 7.2 04/26/2023    GRAN 41.6 04/26/2023    HGB 9.6 (L) 04/26/2023    HCT 31.3 (L) 04/26/2023    MCV 89 04/26/2023    PLT 81 (L) 04/26/2023     04/26/2023    K 3.6 04/26/2023    CALCIUM 7.5 (L) 04/26/2023    PHOS 2.1 (L) 04/26/2023    MG 1.7 04/26/2023    CO2 22 (L) 04/26/2023    ANIONGAP 17 (H) 04/26/2023    GLU 90 04/26/2023    HGBA1C 5.1 03/26/2023       Renal, Liver, Pancreas, Thyroid, Parathyroid, Prolactin, CPK, Lipids & Vitamin Levels: (i.e. Cr, BUN, Anion Gap, GFR, Urine Specific Gravity, Urine Protein, Microalburnin, AST, ALT, GGT, Alk Phos,Total Bili, Total Protein, Albumin, Ammonia, INR, Amylase, Lipase, TSH, Total T3, Total T4, Free T4 PTH, Prolactin, CPK, Cholesterol, Triglycerides, LDH, HDL, Vitamin B12, Folate, Vitamin D)  Lab Results   Component Value Date    CREATININE 1.2 04/26/2023    BUN 29 (H) 04/26/2023    EGFRNORACEVR 50.5 (A) 04/26/2023    SPECGRAV >1.030 (A) 04/26/2023    PROTEINUA Trace (A) 04/26/2023    AST 48 (H) 04/26/2023    ALT 19 04/26/2023     (H) 04/01/2011    ALKPHOS 48 (L) 04/26/2023    BILITOT 0.7 04/26/2023    LABPROT 11.8 03/26/2023    ALBUMIN 3.7 04/26/2023    AMMONIA 29 04/06/2023    INR 1.1 03/26/2023    AMYLASE 19 (L) 10/14/2014    LIPASE 18 04/25/2023    TSH 0.820 12/24/2022    FREET4 0.90 04/18/2022    PTH 45 10/15/2014    CPK 25 04/01/2023    CHOL 184 04/06/2023    TRIG 161 (H) 04/06/2023    LDLCALC 107.8 04/06/2023    HDL 44 04/06/2023    [id number] 542 04/06/2023    FOLATE 13.3 04/06/2023    THIAMINEBLOO 123 (H) 04/06/2023    [id number] 24 (L) 10/15/2014       Infection Diseases, Pregnancy Screenings & Drug Levels: (i.e. Hepatitis Panel, HIV, Syphilis, Urine & Blood Pregnancy Screens, beta hCG, Lithium, Valproic Acid,  Carbamazepine, Lamotrigine, Phenytoin, Phenobarbital, Clozapine, Norclozapine, Clozapine + Norclozapine)   Lab Results   Component Value Date    HEPAIGM Negative 07/26/2017    HEPBIGM Positive (A) 07/26/2017    HEPCAB Non-reactive 03/10/2023    [id number] Non-reactive 03/10/2023    HCGQUANT <2.0 05/09/2006    LITHIUM <0.1 (L) 09/29/2016       Addiction: (i.e. Urine Toxicology, Blood Alcohol, PETH, EtG, EtS, CDT, Buprenorphine, Norbuprenorphine)  Lab Results   Component Value Date    PCDSOALCOHOL 100 (A) 04/29/2022    PCDSOBENZOD Negative 04/29/2022    BARBITURATES Negative 04/29/2022    PCDSCOMETHA Negative 04/29/2022    OPIATESCREEN Negative 04/29/2022    COCAINEMETAB Negative 04/29/2022    AMPHETAMINES Negative 04/29/2022    MARIJUANATHC Presumptive Positive (A) 04/29/2022    PCDSOPHENCYN Negative 04/29/2022    [id number] 09/27/2022    ALCOHOLMEDIC 86 (H) 04/25/2023       Results for orders placed or performed during the hospital encounter of 04/25/23   EKG 12-lead    Collection Time: 04/25/23 10:17 PM    Narrative    Test Reason : R11.2,    Vent. Rate : 117 BPM     Atrial Rate : 117 BPM     P-R Int : 130 ms          QRS Dur : 096 ms      QT Int : 372 ms       P-R-T Axes : 087 067 056 degrees     QTc Int : 518 ms    Sinus tachycardia  Otherwise normal ECG  When compared with ECG of 01-APR-2023 17:52,  No significant change was found  Confirmed by Arlei Olsen MD ([id number]) on 4/26/2023 9:08:54 AM    Referred By: [email or web address]   SELF           Confirmed By:Areli Olsen MD       Results for orders placed or performed during the hospital encounter of 04/01/23   CT Head Without Contrast    Narrative    EXAMINATION:  CT HEAD WITHOUT CONTRAST    CLINICAL HISTORY:  Mental status change, unknown cause;    TECHNIQUE:  Low dose axial CT images obtained throughout the head without the use of intravenous contrast.  Axial, sagittal and coronal reconstructions were performed.    COMPARISON:  CT head without contrast  03/26/2023.    FINDINGS:  Intracranial compartment:    Generalized cerebral volume loss with compensatory dilation of the ventricles and sulci.  No evidence of hydrocephalus.    Supratentorial white matter hypoattenuation, nonspecific and suggestive of chronic microvascular ischemic change.  Stable hypodense focus in the left basal ganglia, likely representing remote lacunar type infarct.  No parenchymal mass, hemorrhage, edema or major vascular distribution infarct.    No extra-axial blood or fluid collections.    Skull/extracranial contents (limited evaluation):    No fracture. Partial opacification of the right mastoid air cells.  Mild mucosal thickening within the paranasal sinuses.      Impression    1. No evidence of acute intracranial pathology.  Additional evaluation, as clinically warranted  2. Additional incidental findings, as above.    Electronically signed by resident: Angelina Bartlett  Date:    04/01/2023  Time:    21:21    Electronically signed by: Uche Montano MD  Date:    04/01/2023  Time:    21:38   Results for orders placed or performed during the hospital encounter of 06/10/22   MRI Brain Without Contrast    Narrative    EXAMINATION:  MRI BRAIN WITHOUT CONTRAST    CLINICAL HISTORY:  hx of PRES;    TECHNIQUE:  Multiplanar multisequence MR imaging of the brain was performed without intravenous contrast.    COMPARISON:  Head CT 06/10/2022, brain MRI 10/04/2017, 07/21/2017    FINDINGS:  Intracranial Compartment:    Ventricles are normal in size for age without evidence of hydrocephalus.    Ill-defined focus T2-FLAIR signal hyperintensity in the right periatrial white matter.  Few additional scattered punctate foci elsewhere within the supratentorial white matter.  These appear similar to the prior study of 10/04/2017.  No definite new focal lesions.  No diffusion restriction to indicate an acute infarction.  No remote major vascular distribution infarct.  No recent or remote hemorrhage.  No mass effect or  midline shift.    No extra-axial blood or fluid collections.    Normal vascular flow voids are preserved.    Skull/Extracranial Contents (limited evaluation):    Bone marrow signal intensity is normal.      Impression    No evidence of acute intracranial pathology.      Electronically signed by: Miguel Malagon MD  Date:    06/10/2022  Time:    09:45       CONSULTATION:     A diagnostic psychiatric evaluation was performed and responsiveness to treatment was assessed.  The patient demonstrates adequate ability/capacity to respond to treatment.    Consults    - The consulting clinician was informed of the encounter documentation.

## 2023-04-26 NOTE — H&P
[facility] - Emergency Dept  Hospital Medicine  History & Physical    Patient Name: Earl Abdul  MRN: [id number]  Patient Class: IP- Inpatient  Admission Date: 4/25/2023  Attending Physician: Gallito Crenshaw DO   Primary Care Provider: Andrew Rodriguez MD         Patient information was obtained from patient and ER records.     Subjective:     Principal Problem:Alcohol withdrawal    Chief Complaint:   Chief Complaint   Patient presents with    Nausea     Nausea and vomiting since this morning. Pt states that she is an alcoholic. Drank half a pint today.        HPI: Earl Abdul is a 62 year old female with a past medical history of tobacco use, alcohol abuse, depression with suicidal attempt (2017), history of breast cancer, CLL (dx on 6/2022, not on treatment), fatty liver disease.  She presented to the ER with nausea and vomiting for several days per ER.  She was discharged from her previous hospitalization about 3 weeks ago and states that she stopped drinking for about a week, and then began drinking again about 2 weeks prior to admission.  Her last drink was at 10 AM the day of admission.  She also continues to smoke cigarettes.  She reports abdominal pain, nausea, vomiting, diarrhea, and shortness of breath.  Denies any fevers.  Further history and ROS is limited due to patient uncooperative with exam and sleeping.       Upon chart review she has a history of significant alcohol withdrawal symptoms including seizures that the patient reports.  In the ER she was noted to be actively vomiting.  She appeared ill and short of breath, placed on nasal cannula.  She was tachycardic to the 120s, sinus tach.  Labs remarkable for a large anion gap of 28.  Patient with an DEVANTE, and hypoglycemic to the 50s on chemistry.  Alcohol level of 86, BOHB level of 7.  WBC elevated to 29K with a lymphocytic predominance.      She is admitted to hospital medicine for monitoring for alcohol withdrawals.        Past Medical  History:   Diagnosis Date    Anemia     Anemia     Controlled type 2 diabetes mellitus without complication, without long-term current use of insulin 11/30/2021    COPD (chronic obstructive pulmonary disease)     Depression     Diverticulitis     Fatty liver     GERD (gastroesophageal reflux disease)     Hyperlipidemia     Hypertension     Pancreatitis     Peptic ulcer disease     Polysubstance abuse     Posterior reversible encephalopathy syndrome     Sarcoidosis of lung     Sarcoidosis of lung     over 30 yrs ago    Seizures     7/2017    Suicide attempt     Suicide ideation        Past Surgical History:   Procedure Laterality Date    APPENDECTOMY      BILATERAL MASTECTOMY Bilateral 10/29/2020    Procedure: MASTECTOMY, BILATERAL;  Surgeon: Baylee Kevin MD;  Location: [address];  Service: General;  Laterality: Bilateral;    BREAST REVISION SURGERY Bilateral 2/11/2021    Procedure: BREAST REVISION SURGERY;  Surgeon: Scottie Johnson MD;  Location: [address];  Service: Plastics;  Laterality: Bilateral;    COLONOSCOPY N/A 7/28/2017    Procedure: COLONOSCOPY;  Surgeon: Aaron Alvarado MD;  Location: [facility];  Service: Endoscopy;  Laterality: N/A;    ESOPHAGOGASTRODUODENOSCOPY  10/7/2016, 11/6/2014    2016 - gastritis, duodenitis, 2014 erosive gastritis    ESOPHAGOGASTRODUODENOSCOPY N/A 2/11/2020    Procedure: ESOPHAGOGASTRODUODENOSCOPY (EGD);  Surgeon: Fawn Garrido MD;  Location: [facility];  Service: Endoscopy;  Laterality: N/A;    ESOPHAGOGASTRODUODENOSCOPY N/A 4/19/2021    Procedure: EGD (ESOPHAGOGASTRODUODENOSCOPY);  Surgeon: Paramjit Martino MD;  Location: [facility];  Service: Endoscopy;  Laterality: N/A;    FLEXIBLE SIGMOIDOSCOPY  11/06/2014    colitis    HYSTERECTOMY      IMPLANTATION OF PERMANENT SACRAL NERVE STIMULATOR N/A 7/12/2022    Procedure: INSERTION, NEUROSTIMULATOR, PERMANENT, SACRAL;  Surgeon: Juaquin Edwards MD;  Location: [address]  [facility];  Service: Urology;  Laterality: N/A;  1hr    INJECTION FOR SENTINEL NODE IDENTIFICATION Right 10/29/2020    Procedure: INJECTION, FOR SENTINEL NODE IDENTIFICATION;  Surgeon: Baylee Kevin MD;  [facility];  Service: General;  Laterality: Right;    INJECTION OF JOINT Right 10/10/2019    Procedure: Injection, Joint RIGHT ILIOPSOAS BURSA/TENDON INJECTION AND RIGHT GLUTEAL TENDON INJECTION WITH STEROID AND LIDOCAINE;  Surgeon: Guillaume Rico MD;  [facility]  Service: Pain Management;  Laterality: Right;  NEEDS CONSENT    INSERTION OF BREAST TISSUE EXPANDER Bilateral 10/29/2020    Procedure: INSERTION, TISSUE EXPANDER, BREAST;  Surgeon: Scottie Johnson MD;  Location: [facility] OR [address] [place] [address];  Service: Plastics;  Laterality: Bilateral;  Right breast: 1082 g  Left breast: 1076 g    LIPOSUCTION Bilateral 2/11/2021    Procedure: LIPOSUCTION;  Surgeon: Scottie Johnson MD;  Location: [address];  Service: Plastics;  Laterality: Bilateral;    mediastenoscopy      REPLACEMENT OF IMPLANT OF BREAST Bilateral 2/11/2021    Procedure: REPLACEMENT, IMPLANT, BREAST;  Surgeon: Scottie Johnson MD;  Location: [address];  Service: Plastics;  Laterality: Bilateral;    SENTINEL LYMPH NODE BIOPSY Right 10/29/2020    Procedure: BIOPSY, LYMPH NODE, SENTINEL;  Surgeon: Baylee Kevin MD;  Location: [address];  Service: General;  Laterality: Right;    TONSILLECTOMY N/A 1970    TUBAL LIGATION         Review of patient's allergies indicates:   Allergen Reactions    Lortab [hydrocodone-acetaminophen] Itching    Promethazine Itching and Other (See Comments)       Current Facility-Administered Medications on File Prior to Encounter   Medication    albuterol sulfate nebulizer solution 2.5 mg     Current Outpatient Medications on File Prior to Encounter   Medication Sig    acamprosate (CAMPRAL) 333 mg tablet Take 2 tablets (666 mg total) by mouth 3 (three) times daily.  (Patient not taking: Reported on 2023)    acetaminophen (TYLENOL) 500 MG tablet Take 500-1,500 mg by mouth daily as needed for Pain.    anastrozole (ARIMIDEX) 1 mg Tab Take 1 tablet (1 mg total) by mouth every morning. (Patient not taking: Reported on 3/29/2023.)    ARIPiprazole (ABILIFY) 5 MG Tab Take 5 mg by mouth once daily.    cholecalciferol, vitamin D3, 125 mcg (5,000 unit) capsule Take 5,000 Units by mouth.    COMBIVENT RESPIMAT  mcg/actuation inhaler SMARTSI Puff(s) Via Inhaler Every 6 Hours PRN    dicyclomine (BENTYL) 20 mg tablet Take 20 mg by mouth 4 (four) times daily.    DULoxetine (CYMBALTA) 30 MG capsule Take 1 capsule (30 mg total) by mouth once daily. Take with 60mg =90mg QD    DULoxetine (CYMBALTA) 60 MG capsule Take 60 mg by mouth once daily.    fluticasone furoate-vilanteroL (BREO ELLIPTA) 100-25 mcg/dose diskus inhaler Inhale 1 puff into the lungs once daily. Controller    folic acid (FOLVITE) 1 MG tablet Take 1 tablet (1 mg total) by mouth once daily.    folic acid (FOLVITE) 1 MG tablet Take 1 tablet (1 mg total) by mouth once daily.    gabapentin (NEURONTIN) 600 MG tablet Take 1 tablet (600 mg total) by mouth 2 (two) times daily.    hydrOXYzine pamoate (VISTARIL) 50 MG Cap     isosorbide mononitrate (IMDUR) 30 MG 24 hr tablet Take 1 tablet (30 mg total) by mouth every evening.    levothyroxine (SYNTHROID) 50 MCG tablet Take 1 tablet (50 mcg total) by mouth before breakfast.    lisinopriL (PRINIVIL,ZESTRIL) 20 MG tablet Take 20 mg by mouth.    loperamide (IMODIUM) 2 mg capsule Take 2 mg by mouth 4 (four) times daily as needed for Diarrhea (Per package directions).    losartan (COZAAR) 100 MG tablet Take 1 tablet (100 mg total) by mouth once daily.    melatonin (MELATIN) TAKE 1/2 TABLET TO 1 TABLET BY MOUTH EVERY NIGHT AT BEDTIME    metFORMIN (GLUCOPHAGE-XR) 500 MG ER 24hr tablet Take 2 tablets (1,000 mg total) by mouth 2 (two) times daily with meals.     methocarbamoL (ROBAXIN) 750 MG Tab Take 750 mg by mouth 3 (three) times daily as needed (Pain).    multivitamin (THERAGRAN) per tablet Take 1 tablet by mouth once daily.    ondansetron (ZOFRAN ODT) 4 MG TbDL Take 1 tablet (4 mg total) by mouth every 6 (six) hours as needed (nausea).    pantoprazole (PROTONIX) 40 MG tablet Take 1 tablet (40 mg total) by mouth every morning.    propranoloL (INDERAL) 20 MG tablet Take 1 tablet (20 mg total) by mouth 2 (two) times daily.    thiamine 100 MG tablet Take 1 tablet (100 mg total) by mouth once daily.    thiamine 100 MG tablet Take 1 tablet (100 mg total) by mouth once daily.    traZODone (DESYREL) 300 MG tablet Take 1 tablet (300 mg total) by mouth every evening.     Family History       Problem Relation (Age of Onset)    Breast cancer Maternal Aunt, Daughter    Colon cancer Maternal Uncle    Diabetes Father, Mother    Heart attack Father    Hypertension Father, Mother          Tobacco Use    Smoking status: Former     Packs/day: 0.50     Years: 30.00     Pack years: 15.00     Types: Vaping with nicotine, Cigarettes     Quit date: 2021     Years since quittin.2    Smokeless tobacco: Never   Substance and Sexual Activity    Alcohol use: Yes     Comment: vodka daily (half a regular bottle)    Drug use: Yes     Types: Marijuana     Comment: gummies    Sexual activity: Yes     Birth control/protection: Surgical     Review of Systems   Unable to perform ROS: Other (Patient noncooperative)   Objective:     Vital Signs (Most Recent):  Temp: 98.8 °F (37.1 °C) (23)  Pulse: (!) 112 (23)  Resp: 20 (23)  BP: (!) 147/64 (23)  SpO2: 96 % (23)   Vital Signs (24h Range):  Temp:  [98.8 °F (37.1 °C)] 98.8 °F (37.1 °C)  Pulse:  [] 112  Resp:  [18-21] 20  SpO2:  [84 %-96 %] 96 %  BP: (108-147)/(64-76) 147/64        There is no height or weight on file to calculate BMI.    Physical Exam  Constitutional:        General: She is not in acute distress.     Appearance: She is ill-appearing and toxic-appearing.   HENT:      Head: Normocephalic and atraumatic.      Mouth/Throat:      Mouth: Mucous membranes are moist.      Pharynx: Oropharynx is clear.   Cardiovascular:      Rate and Rhythm: Regular rhythm. Tachycardia present.      Pulses: Normal pulses.      Heart sounds: Normal heart sounds.   Pulmonary:      Effort: Pulmonary effort is normal. No respiratory distress.      Breath sounds: Wheezing present. No rales.   Abdominal:      General: Bowel sounds are normal. There is no distension.      Tenderness: There is abdominal tenderness. There is guarding.   Musculoskeletal:         General: No swelling.      Cervical back: Normal range of motion and neck supple.   Skin:     Capillary Refill: Capillary refill takes 2 to 3 seconds.      Coloration: Skin is not jaundiced.           Significant Labs: All pertinent labs within the past 24 hours have been reviewed.  A1C:   Recent Labs   Lab 12/23/22  0511 03/26/23  1656   HGBA1C 4.6 5.1     Blood Culture: No results for input(s): LABBLOO in the last 48 hours.  BMP:   Recent Labs   Lab 04/25/23 2113   GLU 54*      K 4.5   CL 91*   CO2 20*   BUN 35*   CREATININE 1.5*   CALCIUM 8.7   MG 2.0     CBC:   Recent Labs   Lab 04/25/23 2113   WBC 29.23*   HGB 11.6*   HCT 37.5   *     CMP:   Recent Labs   Lab 04/25/23 2113      K 4.5   CL 91*   CO2 20*   GLU 54*   BUN 35*   CREATININE 1.5*   CALCIUM 8.7   PROT 7.8   ALBUMIN 4.6   BILITOT 0.8   ALKPHOS 55   AST 65*   ALT 25   ANIONGAP 28*     Lactic Acid:   Recent Labs   Lab 04/25/23  2231   LACTATE 2.2     Lipase:   Recent Labs   Lab 04/25/23 2113   LIPASE 18     TSH:   Recent Labs   Lab 12/24/22  0556   TSH 0.820     Urine Culture: No results for input(s): LABURIN in the last 48 hours.  Urine Studies: No results for input(s): COLORU, APPEARANCEUA, PHUR, SPECGRAV, PROTEINUA, GLUCUA, KETONESU, BILIRUBINUA, OCCULTUA,  NITRITE, UROBILINOGEN, LEUKOCYTESUR, RBCUA, WBCUA, BACTERIA, SQUAMEPITHEL, HYALINECASTS in the last 48 hours.    Invalid input(s): ANDREY    Significant Imaging: I have reviewed all pertinent imaging results/findings within the past 24 hours.  I have reviewed and interpreted all pertinent imaging results/findings within the past 24 hours.    Assessment/Plan:     CLL (chronic lymphocytic leukemia)  Patient diagnosed via FISH on 6/2022  Recommend oncology consult for assistance with management, patient with elevated WBC with lymphocytic predominance    Alcoholic ketoacidosis  Patient with significantly elevated anion gap, BOHB elevated  Alcoholic ketosis, hypoglycemia.  Monitor for refeeding syndrome, daily phos  Given thiamine 100mg IV x 1, will start on daily therapy    History of substance abuse  Patient with multiple readmissions related to alcohol use  Would benefit from psychiatric evaluation      Diarrhea  Patient reports watery diarrhea for the past week  Elevated WBC but lymphocytic predominance, in the setting of CLL differential includes Cdiff as well as CLL  Ordered C diff EIA, results pending  Will monitor symptoms of diarrhea, will treat symptomatically if negative for C Diff    Alcohol withdrawal  Continue CIWA-Ar monitoring  Given thiamine 100mg IV x 1 and start on daily thiamine, folic acid  Start on librium 50mg q8hr x 4 doses, then 25mg q6hr PRN  Ativan 1mg IV PRN for breakthru withdrawal symptoms  Monitor for seizure like activity        VTE Risk Mitigation (From admission, onward)         Ordered     IP VTE HIGH RISK PATIENT  Once         04/26/23 0322     Place sequential compression device  Until discontinued         04/26/23 0322     Place sequential compression device  Until discontinued         04/26/23 0319                           Kevin Merino MD  Department of Hospital Medicine  [facility] - Emergency Dept

## 2023-04-26 NOTE — NURSING
Nurses Note -- 4 Eyes      4/26/2023   5:48 AM      Skin assessed during: Admit      [x] No Altered Skin Integrity Present    [x]Prevention Measures Documented      [] Yes- Altered Skin Integrity Present or Discovered   [] LDA Added if Not in Epic (Describe Wound)   [] New Altered Skin Integrity was Present on Admit and Documented in LDA   [] Wound Image Taken    Wound Care Consulted? No    Attending Nurse:  Chey Turner LPN     Second RN/Staff Member:  Lupe Kothari RN

## 2023-04-26 NOTE — HPI
Earl Abdul is a 62 year old female with a past medical history of tobacco use, alcohol abuse, depression with suicidal attempt (2017), history of breast cancer, CLL (dx on 6/2022, not on treatment), fatty liver disease.  She presented to the ER with nausea and vomiting for several days per ER.  She was discharged from her previous hospitalization about 3 weeks ago and states that she stopped drinking for about a week, and then began drinking again about 2 weeks prior to admission.  Her last drink was at 10 AM the day of admission.  She also continues to smoke cigarettes.  She reports abdominal pain, nausea, vomiting, diarrhea, and shortness of breath.  Denies any fevers.  Further history and ROS is limited due to patient uncooperative with exam and sleeping.       Upon chart review she has a history of significant alcohol withdrawal symptoms including seizures that the patient reports.  In the ER she was noted to be actively vomiting.  She appeared ill and short of breath, placed on nasal cannula.  She was tachycardic to the 120s, sinus tach.  Labs remarkable for a large anion gap of 28.  Patient with an DEVANTE, and hypoglycemic to the 50s on chemistry.  Alcohol level of 86, BOHB level of 7.  WBC elevated to 29K with a lymphocytic predominance.      She is admitted to hospital medicine for monitoring for alcohol withdrawals.

## 2023-04-26 NOTE — ASSESSMENT & PLAN NOTE
Patient with significantly elevated anion gap, BOHB elevated  Alcoholic ketosis, hypoglycemia.  Monitor for refeeding syndrome, daily phos  Given thiamine 100mg IV x 1, will start on daily therapy

## 2023-04-26 NOTE — MEDICAL/APP STUDENT
"      INITIAL VISIT: ADDICTION PSYCHIATRY CONSULTATION SERVICE      ASSESSMENT AND PLAN:     DIAGNOSES & PROBLEMS:  Alcohol abuse  Depression  Tobacco dependence     In Summary:  Pt is a 62 y.o F w/ PMH of tobacco use, alcohol abuse, and depression presenting due to c/o nausea, vomiting, diarrhea. Pt states she's been drinking for many years off and on, and usually drinks 2-3 6 oz glasses of vodka daily. She feels as though she is having withdrawal symptoms today 04/26 with c/o n/v, weakness, shaking. She does have a hx of complicated withdrawals and seizures. Had a period of sobriety for 6 months prior to relapsing roughly 3 weeks ago because of marital conflicts. She has been "in and out of" rehab, detox, and AA meetings and is interested in attempting to quit again. Did not enjoy AA meetings and does not wish to go back, but is open to outpatient rehab. Also states she's been smoking for many years off, usually cigarettes <1/2 ppd or vaping. She does not feel ready to quit tobacco at this time. Pt used to see a psychiatrist for her depression but stopped because "it's expensive." She takes Cymbalta, Abilify for it but says that the medications are not working that well. She endorses feeling more depressed recently due to marital problems and her recent awareness of her CLL diagnosis. Denies any SI/HI/AVH and any access to guns.     Plan:  ***  {Management Options:67062::" "}      PRESENTATION:     Earl Abdul presents with the following chief complaint: alcohol and/or drug addiction    Per Chart:  Earl Abdul is a 62 year old female with a past medical history of tobacco use, alcohol abuse, depression with suicidal attempt (2017), history of breast cancer, CLL (dx on 6/2022, not on treatment), fatty liver disease.  She presented to the ER with nausea and vomiting for several days per ER.  She was discharged from her previous hospitalization about 3 weeks ago and states that she stopped drinking for about " "a week, and then began drinking again about 2 weeks prior to admission.  Her last drink was at 10 AM the day of admission.  She also continues to smoke cigarettes.  She reports abdominal pain, nausea, vomiting, diarrhea, and shortness of breath.  Denies any fevers.  Further history and ROS is limited due to patient uncooperative with exam and sleeping.        Upon chart review she has a history of significant alcohol withdrawal symptoms including seizures that the patient reports.  In the ER she was noted to be actively vomiting.  She appeared ill and short of breath, placed on nasal cannula.  She was tachycardic to the 120s, sinus tach.  Labs remarkable for a large anion gap of 28.  Patient with an DEVANTE, and hypoglycemic to the 50s on chemistry.  Alcohol level of 86, BOHB level of 7.  WBC elevated to 29K with a lymphocytic predominance.       She is admitted to hospital medicine for monitoring for alcohol withdrawals.         Per Patient:  Pt is a 62 y.o F w/ PMH of tobacco use, alcohol abuse, and depression presenting due to c/o nausea, vomiting, diarrhea. Pt appeared to be in discomfort and pain attributed to her complaints. She's been drinking for "many years off and on" and usually drinks 2-3 6 oz glasses of vodka daily. Had a period of sobriety for 6 months prior to relapsing roughly 3 weeks ago because of marital conflicts. She feels as though she is having withdrawal symptoms today 04/26 with c/o n/v, weakness, shaking. She has had a hx of complicated withdrawals with seizures but does not feel that coming at this time. She has been "in and out of" rehab, detox, and AA meetings and is agreeable in attempting to quit again. Did not enjoy AA meetings and does not wish to go back, but is open to outpatient rehab. Also states she's been smoking for many years off, usually cigarettes <1/2 ppd or vaping. Had her e-cigarette in her hands when being interviewed. She does not feel ready to quit tobacco at this time. " "Pt used to see a psychiatrist for her depression but stopped because "it's expensive." She takes Cymbalta, Abilify for it but says that the medications are not working that well. She endorses feeling more depressed recently due to marital problems and her recent awareness of her CLL diagnosis. She says that her son is a busy  and her  is not answering her calls, but she can rely on her sister. Denies any SI/HI/AVH and any access to guns.     Collateral:   ***      REVIEW OF SYSTEMS:  I[]I Patient denies any pertinent ROS, and none is known.  I[]I Patient unable or unwilling to provide any ROS.    [x] Y  [] N  sleep disturbance: **  "off and on sleeping"  [x] Y  [] N  appetite/weight change: **  Positive for: decreased appetite "not been eating"  [x] Y  [] N  fatigue/anergia: **   [x] Y  [] N  impairment in focus/concentration: ** a little bit    [x] Y  [] N  depression: **  getting worse because of marital problems  [x] Y  [] N  anxiety/worry: ** "a little bit"   [] Y  [x] N  dysregulated mood/behavior: **     [] Y  [x] N  manic symptomatology: **     [] Y  [x] N  psychosis: **           A pertinent medical review of systems was performed with the following notable findings: trouble with sleeping and poor appetite likely due to n/v/diarrhea, social stressors causing depressed mood     CURRENT PSYCHOTROPIC REGIMEN:  I[x]I Y  I[]I N  I[]I U    Cymbalta  Trazodone   Abilify     ADDICTION:     I[x]I Y  I[]I N  I[]I U  I[x]I Current  I[]I Former  Nicotine Use: Cigarettes <1/2 ppd, and vaping   I[x]I Y  I[]I N  I[]I U  I[x]I Current  I[]I Former  Alcohol Use:   I[x]I Y  I[]I N  I[]I U  I[x]I Current  I[]I Former  Alcohol Misuse/Abuse: did not want to specify but said "drinking for years" "off and on",   I[]I Y  I[]I N  I[]I U  I[]I Current  I[]I Former  Illicit Drug Use/Misuse/Abuse:   I[]I Y  I[]I N  I[]I U  I[]I Current  I[]I Former  Misuse/Abuse of Rx Medications:   I[]I " "Cannabis  I[]I Cocaine  I[]I Heroin  I[]I Meth  I[]I Opioids  I[]I Stimulants  I[]I Benzos  I[]I Other:     I[]I N/A  I[]I U  Substance(s) of Choice: vodka, nicotine  I[]I N/A  I[]I U  Substance(s) Used Currently/Recently: vodka, nicotine   I[]I N/A  I[]I U  Alcohol Consumption: daily or near daily approximately 2-3 6 oz drinks of vodka daily   I[]I N/A  I[]I U  Last Drink: 10 am   I[x]I N/A  I[]I U  Last Drug Use:   I[]I N/A  I[]I U  Duration of Sobriety/Abstinence: "off and on"    I[x]I Y  I[]I N  I[]I U  Hx of Detox:   I[x]I Y  I[]I N  I[]I U  Hx of Rehab:   I[]I Y  I[x]I N  I[]I U  Hx of IVDU:   I[]I Y  I[x]I N  I[]I U  Hx of Accidental Overdose:   I[]I Y  I[x]I N  I[]I U  Hx of DUI:   I[x]I Y  I[]I N  I[]I U  Hx of Complicated Withdrawal (i.e. Seizures and/or Delirium Tremens):   I[]I Y  I[]I N  I[x]I U  Hx of Known/Suspected Substance-Induced Psychiatric Disorder:   I[]I Y  I[]I N  I[x]I U  Hx of Medication Assisted Treatment:   I[x]I Y  I[]I N  I[]I U  Hx of Twelve Step Program (or Equivalent) Involvement:   I[]I Y  I[x]I N  I[]I U  Currently Exhibits Signs of Intoxication:   I[x]I Y  I[]I N  I[]I U  Currently Exhibits Signs of Withdrawal:   I[]I Y  I[x]I N  I[]I U  Currently Active in Recovery:   I[x]I Y  I[]I N  I[]I U  Social Support: sister   I[]I Y  I[x]I N  I[]I U  Spouse/Partner Consumption:     I[]I N/A  I[x]I Y  I[]I N  I[]I U  Acknowledges/Accepts Addiction:   I[]I N/A  I[x]I Y  I[]I N  I[]I U  Advised to Quit/Cut Back:   I[]I N/A  I[x]I Y  I[]I N  I[]I U  Alcohol/Drug Cessation ("Wants to Quit"): {PN-Kdfufsicxk-Cgnhdhphq:87952}  I[]I N/A  I[x]I Y  I[]I N  I[]I U  Motivation to Pursue Treatment: {XX-Additional-Motivation:00798}  I[]I N/A  I[]I Y  I[x]I N  I[]I U  Tobacco Cessation ("Wants to Quit"): {Cessation Counselin}    DSM-5-TR SUBSTANCE USE DISORDER CRITERIA:     -- Impaired Control:  I[]I Y  I[]I N  I[]I U  I[]I A  I[]I D  Often take in larger amounts or over a longer period of " time than was intended:   I[]I Y  I[]I N  I[]I U  I[]I A  I[]I D  Persistent desire or unsuccessful efforts to cut down or control use:   I[]I Y  I[]I N  I[]I U  I[]I A  I[]I D  Great deal of time spent in activities necessary to obtain substance, use, or recover from effects:   I[]I Y  I[]I N  I[]I U  I[]I A  I[]I D  Craving/strong desire for substance or urge to use:   -- Social Impairment:  I[]I Y  I[]I N  I[]I U  I[]I A  I[]I D  Use resulting in failure to fulfill major role obligations at home, work or school:   I[]I Y  I[]I N  I[]I U  I[]I A  I[]I D  Social, occupational, recreational activities decreased because of use:   I[]I Y  I[]I N  I[]I U  I[]I A  I[]I D  Continued use despite having persistent or recurrent social or interpersonal problems caused or exacerbated by the substance:   -- Risky Use:  I[]I Y  I[]I N  I[]I U  I[]I A  I[]I D  Recurrent use in situations in which it is physically hazardous:   I[]I Y  I[]I N  I[]I U  I[]I A  I[]I D  Use despite physical or psychological problems that are likely to have been caused or exacerbated by the substance:   -- Neuroadaptation:  I[]I Y  I[]I N  I[]I U  I[]I A  I[]I D  Tolerance, as defined by either of the following: (1) a need for markedly increased amounts of substance to achieve intoxication or desired effect.  -OR- (2) a markedly diminished effect with continued use of the same amount of substance:   I[]I Y  I[]I N  I[]I U  I[]I A  I[]I D  Withdrawal, as manifested by either of the following: (1) the characteristic withdrawal syndrome for substance.  -OR- (2) substance is taken to relieve or avoid withdrawal symptoms:   -- Mild (1-3), Moderate (4-5), Severe (?6)    I[]I N/A  I[]I Y  I[]I N  I[]I U  I[]I A  I[]I D  Active Substance Use Disorder:       HISTORY:     I[]I Patient denies any history, and none is known.  I[]I Patient unable or unwilling to provide any history.    I[]I Y  I[]I N  I[]I U  Psychiatric Diagnoses: MDD  I[]I Y  I[x]I N  I[]I U   Current Psychiatric Provider (if Applicable):   I[]I Y  I[]I N  I[]I U  Hx of Psychiatric Hospitalization:   I[]I Y  I[]I N  I[]I U  Hx of Outpatient Psychiatric Treatment (psychiatry/psychotherapy):   I[]I Y  I[]I N  I[]I U  Psychotropic Trials: Abilify Cymbalta  I[]I Y  I[x]I N  I[]I U  Prior Suicide Attempts: 2017  I[]I Y  I[]I N  I[]I U  Hx of Suicidal Ideation:   I[]I Y  I[]I N  I[]I U  Hx of Homicidal Ideation:   I[]I Y  I[]I N  I[]I U  Hx of Self-Injurious Behavior (Non-Suicidal):   I[]I Y  I[]I N  I[]I U  Hx of Violence:   I[]I Y  I[x]I N  I[]I U  Documented Hx of Malingering:     FAMILY HISTORY:  I[]I Y  I[]I N  I[]I U        I[]I Y  I[x]I N  I[]I U  Hx of Trauma/Neglect:   I[]I Y  I[x]I N  I[]I U  Hx of Physical Abuse:   I[]I Y  I[x]I N  I[]I U  Hx of Sexual Abuse:   I[x]I Y  I[]I N  I[]I U  Grew Up Locally?:   I[x]I Y  I[]I N  I[]I U  Happy Childhood?:   I[]I Y  I[x]I N  I[]I U  Significant Developmental Delay/Disability?:   I[x]I Y  I[]I N  I[]I U  GED/High School Dipoloma?:   I[]I Y  I[]I N  I[]I U  Post High School Education?:   I[]I Y  I[]I N  I[]I U  Currently Employed?:   I[]I Y  I[]I N  I[]I U  On or Applying for Disability?:   I[]I Y  I[]I N  I[]I U  Functions Independently?:   I[]I Y  I[]I N  I[]I U  Financially Stable?:   I[]I Y  I[]I N  I[]I U  Domiciled?:   I[]I Y  I[]I N  I[]I U  Lives Alone?:   I[]I Y  I[]I N  I[]I U  Heterosexual/Cisgender?:   I[]I Y  I[]I N  I[]I U  Currently in a Romantic Relationship?:   I[x]I Y  I[]I N  I[]I U  Ever ?:   I[x]I Y  I[]I N  I[]I U  Children/Dependents?:   I[x]I Y  I[]I N  I[]I U  Hoahaoism/Spiritual?:   I[]I Y  I[x]I N  I[]I U   History?:   I[]I Y  I[]I N  I[]I U  Current Legal Issues:   I[]I Y  I[]I N  I[]I U  Past Charges/Convictions:   I[]I Y  I[]I N  I[]I U  Hx of Incarceration:   I[]I Y  I[]I N  I[]I U  Engaged in Hobbies/Recreational Activities?:   I[]I Y  I[x]I N  I[]I U  Access to a Gun?:   I[x]I Y  I[]I N  I[]I U  Hx of Seizure:  "with withdrawals  I[]I Y  I[]I N  I[]I U  Hx of Significant Head Trauma (e.g., Loss of Consciousness, Concussion, Coma):    I[]I Y  I[]I N  I[]I U  Medical History & Diagnoses:       The patient's past medical history has been reviewed and updated as appropriate within the electronic medical record system.  {Problem List & Past Medical History:79415::"     "}    Scheduled and PRN Medications: The electronic chart was reviewed and updated as appropriate.  See [employer] for details.  {Current Medications:30602::"     "}    Allergies:  Lortab [hydrocodone-acetaminophen] and Promethazine    PSYCHOSOCIAL FACTORS:  Stressors (Biopsychosocial, Cultural and Environmental): {XX-Stressors:19008}  Functioning Relationships: {Psychfxinrelationships:93474}    STRENGTHS AND LIABILITIES:   {XX-Strengths:32917}  {XX-Liabilities:56032}    Additional Relevant History, As Applicable:       EXAMINATION:     BP (!) 150/70 (BP Location: Left arm, Patient Position: Lying)   Pulse 106   Temp 98.1 °F (36.7 °C)   Resp 16   Ht 5' 7" (1.702 m)   Wt 78.3 kg (172 lb 9.9 oz)   SpO2 (!) 93%   Breastfeeding No   BMI 27.04 kg/m²     MENTAL STATUS EXAMINATION:  General Appearance: ** {Free Text:06201::"***"} {Drop Down:47536}  Behavior: ** {Free Text:40283::"***"} {Drop Down:33721}  Involuntary Movements and Motor Activity: ** {Free Text:34044::"***"} {Drop Down:98351}  Gait and Station: ** {Free Text:46704::"***"} {Drop Down:24480}  Speech and Language: ** {Free Text:99361::"***"} {Drop Down:47743}  Mood: "***"  Affect: ** {Free Text:15417::"***"} {Drop Down:09288}  Thought Process and Associations: ** {Free Text:17248::"***"} {Drop Down:27551}  Thought Content and Perceptions: ** {Free Text:47388::"***"} {Drop Down:16274}  Sensorium: ** {Free Text:94931::"***"} {Drop Down:65717}  Recent and Remote Memory: ** {Free Text:98200::"***"} {Drop Down:45041}  Attention and Concentration: ** {Free Text:65539::"***"} {Drop Down:55538}  Fund of " "Knowledge: ** {Free Text:67226::"***"} {Drop Down:59737}  Insight: ** {Free Text:58375::"***"} {Drop Down:32454}  Judgment: ** {Free Text:56135::"***"} {Drop Down:57195}      RISK MANAGEMENT:     The following risk parameters were assessed during this evaluation:    I[]I Y  I[]I N  I[]I U  I[]I A  Suicidal Ideation/Behavior: ** {Specifiers:18465}  I[]I Y  I[]I N  I[]I U  I[]I A  Homicidal Ideation/Behavior: **  I[]I Y  I[]I N  I[]I U  I[]I A  Violence: **  I[]I Y  I[]I N  I[]I U  I[]I A  Self-Injurious Behavior: **    The patient is deemed to be {Reliability:81247}.    I[]I Y  I[]I N  I[]I U  I[]I A  I[]I N/A  Minimization of Risk Parameters Suspected/Evident: **  I[]I Y  I[]I N  I[]I U  I[]I A  I[]I N/A  Exaggeration of Risk Parameters Suspected/Evident: **  ***    [] Y  [] N  Danger to Self:   [] Y  [] N  Danger to Others:   [] Y  [] N  Grave Disability:     {Disposition Plan:21499:::0}    In cases of emergency, daily coverage provided by Acute/ER Psych MD, NP, PA, or SW, with contact numbers located in [facility] On Call Schedule.    Jami Meraz  Department of Psychiatry  [facility]        KEY:     I[]I Y = Yes / Present / Endorses  I[]I N = No / Absent / Denies  I[]I U = Unknown / Unable to Assess / Unwilling to Participate  I[]I A = Ambiguity Exists / Accuracy Uncertain  I[]I D = Denial or Minimization is Suspected/Evident  I[]I N/A = Non-Applicable    CHART REVIEW:     Available documentation has been reviewed, and pertinent elements of the chart have been incorporated into this evaluation where appropriate.    The patient's last Epic encounter in the psychiatry department was on: Visit date not found  The patient's first Epic encounter in the psychiatry department was on:      LA/MS  [facility]  Site reviewed - { Options:88148::" "}  ***    ADVICE AND COUNSELING:     [x] In cases of emergencies (e.g. SI/HI resulting in danger to self or others, functioning deteriorates to the level " of grave disability), call 911 or [phone], or present to the emergency department for immediate assistance.  [x] Patient should not operate a motor vehicle or heavy machinery if effects of medications or underlying symptoms/condition impair the ability to safely do so.    Alcohol, Tobacco, and Drug Counseling, as well as resources, has been provided, as warranted.     Shared medical decision making and informed consent are the hallmark and bedrock of good clinical care, and as such have been employed and obtained, respectively, to the degree possible.      Risk Mitigation Strategies, Harm Reduction Techniques, and Safety Netting are important interventions that can reduce acute and chronic risk, and as such have been employed to the degree possible.    Prescription Drug Management entails the review, recommendation, or consideration without recommendation of medications, and as such was employed during the encounter.    Additional Psychoeducation has been provided, as warranted.    Discussed, to the extent possible, diagnosis, risks and benefits of proposed treatment vs alternative treatments vs no treatment, potential side effects of these treatments and the inherent unpredictability of treatment. {Ability to Consent:41017}    Written material*** has been provided to supplement, augment, and reinforce any discussions and interventions, via the AVS or other pre-printed handouts, as warranted.      DIAGNOSTIC TESTING:     The chart was reviewed for recent diagnostic procedures and investigations, and pertinent results are noted below.    Wt Readings from Last 2 Encounters:   04/26/23 78.3 kg (172 lb 9.9 oz)   04/06/23 81.7 kg (180 lb 1.9 oz)     BP Readings from Last 1 Encounters:   04/26/23 (!) 150/70     Pulse Readings from Last 1 Encounters:   04/26/23 106        Blood Counts, Electrolytes & Glucose: (i.e. WBC, ANC, Hemoglobin, Hematocrit, MCV, Platelets)  Lab Results   Component Value Date    WBC 17.37 (H) 04/26/2023     GRAN 7.2 04/26/2023    GRAN 41.6 04/26/2023    HGB 9.6 (L) 04/26/2023    HCT 31.3 (L) 04/26/2023    MCV 89 04/26/2023    PLT 81 (L) 04/26/2023     04/26/2023    K 3.6 04/26/2023    CALCIUM 7.5 (L) 04/26/2023    PHOS 2.1 (L) 04/26/2023    MG 1.7 04/26/2023    CO2 22 (L) 04/26/2023    ANIONGAP 17 (H) 04/26/2023    GLU 90 04/26/2023    HGBA1C 5.1 03/26/2023       Renal, Liver, Pancreas, Thyroid, Parathyroid, Prolactin, CPK, Lipids & Vitamin Levels: (i.e. Cr, BUN, Anion Gap, GFR, Urine Specific Gravity, Urine Protein, Microalburnin, AST, ALT, GGT, Alk Phos,Total Bili, Total Protein, Albumin, Ammonia, INR, Amylase, Lipase, TSH, Total T3, Total T4, Free T4 PTH, Prolactin, CPK, Cholesterol, Triglycerides, LDH, HDL, Vitamin B12, Folate, Vitamin D)  Lab Results   Component Value Date    CREATININE 1.2 04/26/2023    BUN 29 (H) 04/26/2023    EGFRNORACEVR 50.5 (A) 04/26/2023    SPECGRAV >1.030 (A) 04/26/2023    PROTEINUA Trace (A) 04/26/2023    AST 48 (H) 04/26/2023    ALT 19 04/26/2023     (H) 04/01/2011    ALKPHOS 48 (L) 04/26/2023    BILITOT 0.7 04/26/2023    LABPROT 11.8 03/26/2023    ALBUMIN 3.7 04/26/2023    AMMONIA 29 04/06/2023    INR 1.1 03/26/2023    AMYLASE 19 (L) 10/14/2014    LIPASE 18 04/25/2023    TSH 0.820 12/24/2022    FREET4 0.90 04/18/2022    PTH 45 10/15/2014    CPK 25 04/01/2023    CHOL 184 04/06/2023    TRIG 161 (H) 04/06/2023    LDLCALC 107.8 04/06/2023    HDL 44 04/06/2023    [id number] 542 04/06/2023    FOLATE 13.3 04/06/2023    THIAMINEBLOO 123 (H) 04/06/2023    [id number] 24 (L) 10/15/2014       Infection Diseases, Pregnancy Screenings & Drug Levels: (i.e. Hepatitis Panel, HIV, Syphilis, Urine & Blood Pregnancy Screens, beta hCG, Lithium, Valproic Acid, Carbamazepine, Lamotrigine, Phenytoin, Phenobarbital, Clozapine, Norclozapine, Clozapine + Norclozapine)   Lab Results   Component Value Date    HEPAIGM Negative 07/26/2017    HEPBIGM Positive (A) 07/26/2017    HEPCAB Non-reactive  03/10/2023    [id number] Non-reactive 03/10/2023    HCGQUANT <2.0 05/09/2006    LITHIUM <0.1 (L) 09/29/2016       Addiction: (i.e. Urine Toxicology, Blood Alcohol, PETH, EtG, EtS, CDT, Buprenorphine, Norbuprenorphine)  Lab Results   Component Value Date    PCDSOALCOHOL 100 (A) 04/29/2022    PCDSOBENZOD Negative 04/29/2022    BARBITURATES Negative 04/29/2022    PCDSCOMETHA Negative 04/29/2022    OPIATESCREEN Negative 04/29/2022    COCAINEMETAB Negative 04/29/2022    AMPHETAMINES Negative 04/29/2022    MARIJUANATHC Presumptive Positive (A) 04/29/2022    PCDSOPHENCYN Negative 04/29/2022    [id number] 1092 09/27/2022    ALCOHOLMEDIC 86 (H) 04/25/2023       Results for orders placed or performed during the hospital encounter of 04/25/23   EKG 12-lead    Collection Time: 04/25/23 10:17 PM    Narrative    Test Reason : R11.2,    Vent. Rate : 117 BPM     Atrial Rate : 117 BPM     P-R Int : 130 ms          QRS Dur : 096 ms      QT Int : 372 ms       P-R-T Axes : 087 067 056 degrees     QTc Int : 518 ms    Sinus tachycardia  Otherwise normal ECG  When compared with ECG of 01-APR-2023 17:52,  No significant change was found  Confirmed by Areli Olsen MD ([id number]) on 4/26/2023 9:08:54 AM    Referred By: [email or web address]   SELF           Confirmed By:Areli Olsen MD       Results for orders placed or performed during the hospital encounter of 04/01/23   CT Head Without Contrast    Narrative    EXAMINATION:  CT HEAD WITHOUT CONTRAST    CLINICAL HISTORY:  Mental status change, unknown cause;    TECHNIQUE:  Low dose axial CT images obtained throughout the head without the use of intravenous contrast.  Axial, sagittal and coronal reconstructions were performed.    COMPARISON:  CT head without contrast 03/26/2023.    FINDINGS:  Intracranial compartment:    Generalized cerebral volume loss with compensatory dilation of the ventricles and sulci.  No evidence of hydrocephalus.    Supratentorial white matter hypoattenuation, nonspecific and  suggestive of chronic microvascular ischemic change.  Stable hypodense focus in the left basal ganglia, likely representing remote lacunar type infarct.  No parenchymal mass, hemorrhage, edema or major vascular distribution infarct.    No extra-axial blood or fluid collections.    Skull/extracranial contents (limited evaluation):    No fracture. Partial opacification of the right mastoid air cells.  Mild mucosal thickening within the paranasal sinuses.      Impression    1. No evidence of acute intracranial pathology.  Additional evaluation, as clinically warranted  2. Additional incidental findings, as above.    Electronically signed by resident: Angelina Bartlett  Date:    04/01/2023  Time:    21:21    Electronically signed by: Uche Montano MD  Date:    04/01/2023  Time:    21:38   Results for orders placed or performed during the hospital encounter of 06/10/22   MRI Brain Without Contrast    Narrative    EXAMINATION:  MRI BRAIN WITHOUT CONTRAST    CLINICAL HISTORY:  hx of PRES;    TECHNIQUE:  Multiplanar multisequence MR imaging of the brain was performed without intravenous contrast.    COMPARISON:  Head CT 06/10/2022, brain MRI 10/04/2017, 07/21/2017    FINDINGS:  Intracranial Compartment:    Ventricles are normal in size for age without evidence of hydrocephalus.    Ill-defined focus T2-FLAIR signal hyperintensity in the right periatrial white matter.  Few additional scattered punctate foci elsewhere within the supratentorial white matter.  These appear similar to the prior study of 10/04/2017.  No definite new focal lesions.  No diffusion restriction to indicate an acute infarction.  No remote major vascular distribution infarct.  No recent or remote hemorrhage.  No mass effect or midline shift.    No extra-axial blood or fluid collections.    Normal vascular flow voids are preserved.    Skull/Extracranial Contents (limited evaluation):    Bone marrow signal intensity is normal.      Impression    No evidence of  acute intracranial pathology.      Electronically signed by: Miguel Malagon MD  Date:    06/10/2022  Time:    09:45       CONSULTATION:     A diagnostic psychiatric evaluation was performed and responsiveness to treatment was assessed.  {XX-ResponseTX:36934}    Consults    {XX-Courtesy:19936}

## 2023-04-26 NOTE — NURSING
Nurses Note -- 4 Eyes      4/26/2023   6:02 AM      Skin assessed during: Admit      [x] No Altered Skin Integrity Present    []Prevention Measures Documented      [] Yes- Altered Skin Integrity Present or Discovered   [] LDA Added if Not in Epic (Describe Wound)   [] New Altered Skin Integrity was Present on Admit and Documented in LDA   [] Wound Image Taken    Wound Care Consulted? No    Attending Nurse:  Lupe Kothari RN     Second RN/Staff Member: Chey Turner LPN

## 2023-04-26 NOTE — ED PROVIDER NOTES
Encounter Date: 4/25/2023       History     Chief Complaint   Patient presents with    Nausea     Nausea and vomiting since this morning. Pt states that she is an alcoholic. Drank half a pint today.     62-year-old female with dm 2, COPD, history of alcohol abuse, hypertension, pancreatitis, PUD, GERD, sarcoidosis of the lung, PRES presents for nausea and vomiting for several days duration.  She reports associated abdominal pain, chest pain and shortness of breath.  Her last drink was at 10:00 a.m..  History is limited as the patient is actively vomiting.    Review of patient's allergies indicates:   Allergen Reactions    Lortab [hydrocodone-acetaminophen] Itching    Promethazine Itching and Other (See Comments)     Past Medical History:   Diagnosis Date    Anemia     Anemia     Controlled type 2 diabetes mellitus without complication, without long-term current use of insulin 11/30/2021    COPD (chronic obstructive pulmonary disease)     Depression     Diverticulitis     Fatty liver     GERD (gastroesophageal reflux disease)     Hyperlipidemia     Hypertension     Pancreatitis     Peptic ulcer disease     Polysubstance abuse     Posterior reversible encephalopathy syndrome     Sarcoidosis of lung     Sarcoidosis of lung     over 30 yrs ago    Seizures     7/2017    Suicide attempt     Suicide ideation      Past Surgical History:   Procedure Laterality Date    APPENDECTOMY      BILATERAL MASTECTOMY Bilateral 10/29/2020    Procedure: MASTECTOMY, BILATERAL;  Surgeon: Baylee Kevin MD;  Location: [facility] OR [address] [place] [address];  Service: General;  Laterality: Bilateral;    BREAST REVISION SURGERY Bilateral 2/11/2021    Procedure: BREAST REVISION SURGERY;  Surgeon: Scottie Johnson MD;  Location: [address];  Service: Plastics;  Laterality: Bilateral;    COLONOSCOPY N/A 7/28/2017    Procedure: COLONOSCOPY;  Surgeon: Aaron Alvarado MD;  Location: [facility];  Service: Endoscopy;  Laterality: N/A;     ESOPHAGOGASTRODUODENOSCOPY  10/7/2016, 11/6/2014    2016 - gastritis, duodenitis, 2014 erosive gastritis    ESOPHAGOGASTRODUODENOSCOPY N/A 2/11/2020    Procedure: ESOPHAGOGASTRODUODENOSCOPY (EGD);  Surgeon: Fawn Garrido MD;  Location: [facility];  Service: Endoscopy;  Laterality: N/A;    ESOPHAGOGASTRODUODENOSCOPY N/A 4/19/2021    Procedure: EGD (ESOPHAGOGASTRODUODENOSCOPY);  Surgeon: Paramjit Martino MD;  Location: [facility];  Service: Endoscopy;  Laterality: N/A;    FLEXIBLE SIGMOIDOSCOPY  11/06/2014    colitis    HYSTERECTOMY      IMPLANTATION OF PERMANENT SACRAL NERVE STIMULATOR N/A 7/12/2022    Procedure: INSERTION, NEUROSTIMULATOR, PERMANENT, SACRAL;  Surgeon: Juaquin Edwards MD;  Location: [facility] OR [address] [place] [address];  Service: Urology;  Laterality: N/A;  1hr    INJECTION FOR SENTINEL NODE IDENTIFICATION Right 10/29/2020    Procedure: INJECTION, FOR SENTINEL NODE IDENTIFICATION;  Surgeon: Baylee Kevin MD;  Location: [address];  Service: General;  Laterality: Right;    INJECTION OF JOINT Right 10/10/2019    Procedure: Injection, Joint RIGHT ILIOPSOAS BURSA/TENDON INJECTION AND RIGHT GLUTEAL TENDON INJECTION WITH STEROID AND LIDOCAINE;  Surgeon: Guillaume Rico MD;  Location: [facility];  Service: Pain Management;  Laterality: Right;  NEEDS CONSENT    INSERTION OF BREAST TISSUE EXPANDER Bilateral 10/29/2020    Procedure: INSERTION, TISSUE EXPANDER, BREAST;  Surgeon: Scottie Johnson MD;  Location: [address];  Service: Plastics;  Laterality: Bilateral;  Right breast: 1082 g  Left breast: 1076 g    LIPOSUCTION Bilateral 2/11/2021    Procedure: LIPOSUCTION;  Surgeon: Scottie Johnson MD;  Location: [facility] OR [address] [place] [address];  Service: Plastics;  Laterality: Bilateral;    mediastenoscopy      REPLACEMENT OF IMPLANT OF BREAST Bilateral 2/11/2021    Procedure: REPLACEMENT, IMPLANT, BREAST;  Surgeon: Scottie Johnson MD;  Location: [facility] OR [address] [place] [address];  Service: Plastics;  Laterality: Bilateral;     SENTINEL LYMPH NODE BIOPSY Right 10/29/2020    Procedure: BIOPSY, LYMPH NODE, SENTINEL;  Surgeon: Baylee Kevin MD;  Location: [facility] OR [address] [place] [address];  Service: General;  Laterality: Right;    TONSILLECTOMY N/A     TUBAL LIGATION       Family History   Problem Relation Age of Onset    Heart attack Father     Diabetes Father     Hypertension Father     Diabetes Mother     Hypertension Mother     Breast cancer Maternal Aunt     Colon cancer Maternal Uncle     Breast cancer Daughter     Ovarian cancer Neg Hx     Cancer Neg Hx      Social History     Tobacco Use    Smoking status: Former     Packs/day: 0.50     Years: 30.00     Pack years: 15.00     Types: Vaping with nicotine, Cigarettes     Quit date: 2021     Years since quittin.2    Smokeless tobacco: Never   Substance Use Topics    Alcohol use: Yes     Comment: vodka daily (half a regular bottle)    Drug use: Yes     Types: Marijuana     Comment: gummies     Review of Systems    Physical Exam     Initial Vitals [23]   BP Pulse Resp Temp SpO2   108/76 70 18 98.8 °F (37.1 °C) 95 %      MAP       --         Physical Exam    Nursing note and vitals reviewed.  Constitutional: She appears well-developed and well-nourished. She is not diaphoretic. She appears ill. No distress.   Actively vomiting, ill-appearing   HENT:   Head: Normocephalic and atraumatic.   Eyes: EOM are normal. Pupils are equal, round, and reactive to light.   Neck:   Normal range of motion.  Cardiovascular:  Regular rhythm, normal heart sounds and intact distal pulses.     Exam reveals no gallop and no friction rub.       No murmur heard.  Tachycardic   Pulmonary/Chest: Breath sounds normal. No respiratory distress. She has no wheezes. She has no rhonchi. She has no rales. She exhibits no tenderness.   Abdominal: Abdomen is soft. Bowel sounds are normal. She exhibits no distension and no mass. There is generalized abdominal tenderness.   Diffuse tenderness.  Liquid stool noted on  the legs There is no rebound and no guarding.   Musculoskeletal:         General: Normal range of motion.      Cervical back: Normal range of motion.     Neurological: She is alert and oriented to person, place, and time.   Skin: Skin is warm and dry.   Psychiatric: She has a normal mood and affect.       ED Course   Procedures  Labs Reviewed   CBC W/ AUTO DIFFERENTIAL - Abnormal; Notable for the following components:       Result Value    WBC 29.23 (*)     Hemoglobin 11.6 (*)     MCHC 30.9 (*)     RDW 17.8 (*)     Platelets 148 (*)     Gran % 29.0 (*)     Lymph % 65.0 (*)     Platelet Estimate Decreased (*)     All other components within normal limits    Narrative:     add on lipase per dr dayami ngo link/order#[id number] @22:11   04/25/2023  Add on MG per Dayami Hendricks PA-C @ 22:18 pm to order #   [id number]   COMPREHENSIVE METABOLIC PANEL - Abnormal; Notable for the following components:    Chloride 91 (*)     CO2 20 (*)     Glucose 54 (*)     BUN 35 (*)     Creatinine 1.5 (*)     AST 65 (*)     Anion Gap 28 (*)     eGFR 38.7 (*)     All other components within normal limits   BETA - HYDROXYBUTYRATE, SERUM - Abnormal; Notable for the following components:    Beta-Hydroxybutyrate 7.2 (*)     All other components within normal limits   ALCOHOL,MEDICAL (ETHANOL) - Abnormal; Notable for the following components:    Alcohol, Serum 86 (*)     All other components within normal limits   PROCALCITONIN - Abnormal; Notable for the following components:    Procalcitonin 0.66 (*)     All other components within normal limits   BETA - HYDROXYBUTYRATE, SERUM - Abnormal; Notable for the following components:    Beta-Hydroxybutyrate 5.8 (*)     All other components within normal limits   POCT GLUCOSE - Abnormal; Notable for the following components:    POCT Glucose 149 (*)     All other components within normal limits   ISTAT PROCEDURE - Abnormal; Notable for the following components:    POC PO2 161 (*)     All other  components within normal limits   CULTURE, BLOOD   CULTURE, BLOOD   LIPASE   LIPASE    Narrative:     add on lipase per dr dayami edwards link/order#[id number] @22:11   04/25/2023   MAGNESIUM   TROPONIN I   B-TYPE NATRIURETIC PEPTIDE   MAGNESIUM    Narrative:     add on lipase per dr dayami edwards link/order#[id number] @22:11   04/25/2023  Add on MG per Dayami Edwards-Link, PA-C @ 22:18 pm to order #   [id number]   TROPONIN I    Narrative:     add on lipase per dr dayami edwards link/order#[id number] @22:11   04/25/2023  Add on MG per Dayami Tyronebart-Link, PA-C @ 22:18 pm to order #   [id number]   URINALYSIS, REFLEX TO URINE CULTURE   LACTIC ACID, PLASMA   POCT GLUCOSE MONITORING CONTINUOUS   POCT GLUCOSE MONITORING CONTINUOUS   POCT GLUCOSE MONITORING CONTINUOUS   POCT GLUCOSE MONITORING CONTINUOUS   POCT GLUCOSE MONITORING CONTINUOUS   POCT GLUCOSE MONITORING CONTINUOUS     EKG Readings: (Independently Interpreted)   Initial Reading: No STEMI. Previous EKG: Compared with most recent EKG Previous EKG Date: 4/1/2023. Rhythm: Sinus Tachycardia. Heart Rate: 117. Ectopy: No Ectopy. ST Segments: Normal ST Segments. T Waves: Normal. Other Findings: Prolonged QT Interval. Clinical Impression: Sinus Tachycardia Other Impression: QTc= 518     Imaging Results              CT Abdomen Pelvis With Contrast (Final result)  Result time 04/26/23 01:15:42      Final result by Aayush Hoffman MD (04/26/23 01:15:42)                   Impression:      1.  Questionable mild wall thickening involving the cecum and ascending colon which may relate to nondistention, although a nonspecific colitis is not excluded.  Clinical correlation is advised.    2.  Scattered colonic diverticula without evidence to suggest acute diverticulitis.    3.  Hepatomegaly.  Probable hepatic steatosis.    4.  Mild circumferential wall thickening of the distal thoracic esophagus with associated small hiatal hernia.  Findings may relate to underlying esophagitis  and/or reflux.  Further assessment with endoscopy as warranted.    5.  Redemonstration of multiple nonspecific enlarged abdominal and pelvic lymph nodes, similar to prior examination.    6.  Additional findings as above.      Electronically signed by: Aayush Hoffman MD  Date:    04/26/2023  Time:    01:15               Narrative:    EXAMINATION:  CT ABDOMEN PELVIS WITH CONTRAST    CLINICAL HISTORY:  Abdominal pain, acute, nonlocalized;    TECHNIQUE:  Low dose axial images, sagittal and coronal reformations were obtained from the lung bases to the pubic symphysis following the IV administration of 100 mL of Omnipaque 350 .  Oral contrast was not given.    COMPARISON:  CT 03/13/2023    FINDINGS:  The lung bases are unremarkable.  There is no pleural fluid present.  The visualized portions of the heart appear normal.    The liver is enlarged.  There is diffuse decreased attenuation of the hepatic parenchyma which may relate to hepatic steatosis.  The portal vein is patent.  The gallbladder shows no evidence of stones or pericholecystic fluid.  There is no intra-or extrahepatic biliary ductal dilatation.    There is a small hiatal hernia.  There is suspected mild circumferential wall thickening of the distal thoracic esophagus.  Stomach is unremarkable.  The spleen is enlarged.  There is a stable 2.3 cm low-attenuation splenic lesion again identified.  There is fatty involutional change of the pancreas.  No significant peripancreatic inflammatory change appreciated.  The adrenal glands are unremarkable.    Kidneys are normal in size.  There is no hydronephrosis.  No obstructing ureteral calculus identified.  The urinary bladder is unremarkable. The uterus is surgically absent.  No significant free fluid in the pelvis.    The abdominal aorta is normal in course and caliber with moderate atherosclerotic calcification along its course.  There is no retroperitoneal hematoma.    There is no evidence of small-bowel  "obstruction.  There is questionable mild wall thickening involving the cecum and ascending colon.  There are scattered colonic diverticula throughout the sigmoid and descending colon without evidence to suggest acute diverticulitis.  The appendix is not visualized and may be surgically absent.  There is no ascites, portal venous gas, or free intraperitoneal air.  There is a small fat containing umbilical hernia.    There are multiple enlarged upper abdominal nolvia hepatis lymph nodes again identified, similar to prior examination.  There are scattered shotty mesenteric, retroperitoneal, and inguinal lymph nodes, similar to prior examination.  There are multiple enlarged bilateral external iliac chain lymph nodes.    Osseous structures demonstrate postsurgical change of the lumbar spine at L4-L5.  There are moderately advanced multilevel degenerative changes, similar to prior study.  There is a sacral stimulator device present.  The extraperitoneal soft tissues are unremarkable.                                       X-Ray Chest AP Portable (Final result)  Result time 04/26/23 00:23:18      Final result by Boubacar Rob MD (04/26/23 00:23:18)                   Impression:      No convincing acute process or detrimental change when compared with 04/01/2023, allowing for soft tissue attenuation of the x-ray beam and portable technique.  Further evaluation/follow-up with PA and lateral views may provide improved sensitivity if there is persistent clinical concern.      Electronically signed by: Boubacar Rob MD  Date:    04/26/2023  Time:    00:23               Narrative:    EXAMINATION:  XR CHEST AP PORTABLE    CLINICAL HISTORY:  Provided history is "Nausea and vomiting;  ".    TECHNIQUE:  One view of the chest.    COMPARISON:  04/06/2023 (PA and lateral views) and 04/01/2023 (portable chest radiograph).    FINDINGS:  Exam quality is limited by soft tissue attenuation of the x-ray beam and portable technique.  " Probable bilateral breast prostheses limit evaluation of the underlying pulmonary parenchyma.  Cardiomediastinal silhouette is stable.  Chronic appearing blunting of the left costophrenic angle and partial silhouetting of the left hemidiaphragm, potentially related to scarring or subsegmental atelectasis, noting similar findings were present on recent prior chest radiographs.  No obvious confluent area of consolidation.  No convincing large pleural effusion.  No distinct pneumothorax.  No detrimental change when compared with prior studies allowing for differences in technique.                                       Medications   vancomycin (VANCOCIN) 1,000 mg in dextrose 5 % (D5W) 250 mL IVPB (Vial-Mate) (1,000 mg Intravenous New Bag 4/26/23 0118)   sodium chloride 0.9% bolus 1,000 mL 1,000 mL (has no administration in time range)   ondansetron injection 4 mg (4 mg Intravenous Given 4/25/23 2214)   dextrose 10% bolus 250 mL 250 mL (0 mLs Intravenous Stopped 4/25/23 2313)   piperacillin-tazobactam (ZOSYN) 4.5 g in dextrose 5 % in water (D5W) 5 % 100 mL IVPB (MB+) (0 g Intravenous Stopped 4/25/23 2337)   sodium chloride 0.9% bolus 1,000 mL 1,000 mL (1,000 mLs Intravenous New Bag 4/25/23 2308)   iohexoL (OMNIPAQUE 350) injection 100 mL (100 mLs Intravenous Given 4/26/23 0038)   ondansetron injection 4 mg (4 mg Intravenous Given 4/25/23 2307)   LORazepam injection 1 mg (1 mg Intravenous Given 4/26/23 0114)     Medical Decision Making:   History:   Old Medical Records: I decided to obtain old medical records.  Old Records Summarized: records from clinic visits and records from previous admission(s).       <> Summary of Records: Patient discharged 04/03/2023 after 2 day admission for acute on chronic respiratory failure  Initial Assessment:   62-year-old female presenting for nausea and vomiting.  Her triage vitals are normal, however upon my evaluation she is tachycardic, hypoxic, tachypneic, hypertensive and  ill-appearing.  Differential Diagnosis:   Dehydration  Alcoholic ketoacidosis   Aspiration pneumonia   Alcohol withdrawal   Pancreatitis   Electrolyte derangement  Dysrhythmia  Independently Interpreted Test(s):   I have ordered and independently interpreted X-rays - see summary below.       <> Summary of X-Ray Reading(s): No focal consolidation  I have ordered and independently interpreted EKG Reading(s) - see prior notes  Clinical Tests:   Lab Tests: Ordered and Reviewed  Radiological Study: Ordered and Reviewed  Medical Tests: Ordered and Reviewed  ED Management:  Will check labs, placed on supplemental oxygen, give fluids, antiemetics obtain imaging and reassess.    Labs notable for significant leukocytosis with lymphocytic predominance.  CMP notable for DEVANTE, elevated anion gap, hypoglycemia.  CTA shows some findings consistent with colitis.  I discussed this patient with Hospital Medicine, plan for admission.  I discussed this patient with my supervising physician and I am signing out to Eun Kumar MD.    1:52 AM  Mary Hendricks PA-C            Attending Attestation:     Physician Attestation Statement for NP/PA:   I discussed this assessment and plan of this patient with the NP/PA, but I did not personally examine the patient. The face to face encounter was performed by the NP/PA.            ED Course as of 04/26/23 0152 Tue Apr 25, 2023 2209 Anion Gap(!): 28 [CC]   2209 Glucose(!): 54 [CC]   2209 Creatinine(!): 1.5 [CC]   2209 WBC(!): 29.23 [CC]   2241 POCT Glucose(!): 149 [CC]   2304 POC PH: 7.395 [CC]   2314 Beta-Hydroxybutyrate(!): 7.2 [CC]   2327 Lipase: 18 [CC]   2327 Troponin I: 0.015 [CC]   2329 SpO2: 96 % [CC]   2336 Patient's IV line blew, unable to obtain CT.  Will replace IV [CC]   2350 BNP: 28 [CC]   Wed Apr 26, 2023   0134 Lymph %(!): 65.0  Lymphocytic predominance- related to CLL?   [CC]      ED Course User Index  [CC] Mary Hendricks PA-C                 Clinical Impression:    Final diagnoses:  [R11.2] Nausea & vomiting  [J96.01] Acute hypoxemic respiratory failure  [E87.29] Alcoholic ketoacidosis (Primary)        ED Disposition Condition    Admit Stable                Mary Hendricks PA-C  04/26/23 0152       Eun Kumar MD  04/26/23 0204

## 2023-04-26 NOTE — ASSESSMENT & PLAN NOTE
Patient diagnosed via FISH on 6/2022  Recommend oncology consult for assistance with management, patient with elevated WBC with lymphocytic predominance

## 2023-04-26 NOTE — AI DETERIORATION ALERT
RAPID RESPONSE NURSE AI ALERT       AI alert received.    Chart Reviewed: 04/26/2023, 5:14 AM    MRN: [id number]  Bed: 626/626 A    Dx: Alcohol withdrawal    Earl Abdul has a past medical history of Anemia, Anemia, Controlled type 2 diabetes mellitus without complication, without long-term current use of insulin, COPD (chronic obstructive pulmonary disease), Depression, Diverticulitis, Fatty liver, GERD (gastroesophageal reflux disease), Hyperlipidemia, Hypertension, Pancreatitis, Peptic ulcer disease, Polysubstance abuse, Posterior reversible encephalopathy syndrome, Sarcoidosis of lung, Sarcoidosis of lung, Seizures, Suicide attempt, and Suicide ideation.    Last VS: BP (!) 147/64   Pulse (!) 112   Temp 98.8 °F (37.1 °C) (Oral)   Resp 20   SpO2 96%     24H Vital Sign Range:  Temp:  [98.8 °F (37.1 °C)]   Pulse:  []   Resp:  [18-21]   BP: (108-147)/(64-76)   SpO2:  [84 %-96 %]     Level of Consciousness (AVPU): alert    Recent Labs     04/25/23 2113 04/26/23  0340   WBC 29.23* 17.37*   HGB 11.6* 9.6*   HCT 37.5 31.3*   * 81*       Recent Labs     04/25/23 2113 04/26/23  0340    137   K 4.5 3.6   CL 91* 98   CO2 20* 22*   CREATININE 1.5* 1.2   GLU 54* 90   PHOS 3.2 2.1*   MG 2.0 1.7        Recent Labs     04/25/23  2301 04/26/23  0212   PH 7.395 7.310*   PCO2 42.8 57.4*   PO2 161* 51   HCO3 26.2 28.9*   POCSATURATED 99 82*   BE 1 3        OXYGEN:  Flow (L/min): 4          MEWS score: 3    Bedside RN,    contacted. No concerns verbalized at this time. Instructed to call [phone] for further concerns or assistance.    Liborio Pierre RN

## 2023-04-26 NOTE — CARE UPDATE
Care Update Note:    Assumed care of this patient this morning. Patient is agreeable to seeing addiction psych to help get her sober. She is agreeable to discussing what that would look like. She reports her trigger to resume drinking was her  left her approximately 1-2 weeks ago. She is distraught over this.     She is unaware of her cancer diagnosis. She has CLL recorded in her chart. Upon further chart review, was last seen by onc in 8/2022.     Per Dr. 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 June 2022 but has since returned to MBCL criteria; spectrum explained to/ today  -confirmed on June 2022 PB flow cytometry  -asymptomatic with normal physical exam   -no indication for imaging or marrow biopsy currently   -favorable risk 13 q del by fish; will send tp53 sequencing and ighv mutation with next lab draw for better risk stratification  -she has chronic mild anemia/thrombocytopenia that is likely due to her etoh related liver disease for which she follows with hepatology; currently abstinent from etoh; normal nutritional evaluation jan 2022; do not feel cytopenias are related to her Lymphoproliferative disorder   -will follow pt q 6 months with labs and provider appts  -educated on symptoms for which to seek attn in our clinic earlier   -fu with med onc for breast cancer recurrence surveillance    Ultimately she has been lost to follow up. She needs to resume outpatient surveillance with Oncology. Ambulatory referral to H/O at discharge.     Palliative care consulted to assist in broaching her goals of care as she returns to the hospital quite frequently and seems to have poor insight into her medical conditions.     Will consult SW/CM to see if they can offer anything to help her.     Continues on the librium taper for detox. Vitals signs stable.     Gallito Crenshaw DO  Hospitalist

## 2023-04-26 NOTE — SUBJECTIVE & OBJECTIVE
Past Medical History:   Diagnosis Date    Anemia     Anemia     Controlled type 2 diabetes mellitus without complication, without long-term current use of insulin 11/30/2021    COPD (chronic obstructive pulmonary disease)     Depression     Diverticulitis     Fatty liver     GERD (gastroesophageal reflux disease)     Hyperlipidemia     Hypertension     Pancreatitis     Peptic ulcer disease     Polysubstance abuse     Posterior reversible encephalopathy syndrome     Sarcoidosis of lung     Sarcoidosis of lung     over 30 yrs ago    Seizures     7/2017    Suicide attempt     Suicide ideation        Past Surgical History:   Procedure Laterality Date    APPENDECTOMY      BILATERAL MASTECTOMY Bilateral 10/29/2020    Procedure: MASTECTOMY, BILATERAL;  Surgeon: Baylee Kevin MD;  Location: [address];  Service: General;  Laterality: Bilateral;    BREAST REVISION SURGERY Bilateral 2/11/2021    Procedure: BREAST REVISION SURGERY;  Surgeon: Scottie Johnson MD;  Location: [facility] OR [address] [place] [address];  Service: Plastics;  Laterality: Bilateral;    COLONOSCOPY N/A 7/28/2017    Procedure: COLONOSCOPY;  Surgeon: Aaron Alvarado MD;  Location: [facility];  Service: Endoscopy;  Laterality: N/A;    ESOPHAGOGASTRODUODENOSCOPY  10/7/2016, 11/6/2014    2016 - gastritis, duodenitis, 2014 erosive gastritis    ESOPHAGOGASTRODUODENOSCOPY N/A 2/11/2020    Procedure: ESOPHAGOGASTRODUODENOSCOPY (EGD);  Surgeon: Fawn Garrido MD;  Location: [facility];  Service: Endoscopy;  Laterality: N/A;    ESOPHAGOGASTRODUODENOSCOPY N/A 4/19/2021    Procedure: EGD (ESOPHAGOGASTRODUODENOSCOPY);  Surgeon: Paramjit Martino MD;  Location: [facility];  Service: Endoscopy;  Laterality: N/A;    FLEXIBLE SIGMOIDOSCOPY  11/06/2014    colitis    HYSTERECTOMY      IMPLANTATION OF PERMANENT SACRAL NERVE STIMULATOR N/A 7/12/2022    Procedure: INSERTION, NEUROSTIMULATOR, PERMANENT, SACRAL;  Surgeon: Juaquin Edwards MD;  Location: [facility] OR [address] [place] [address];  Service:  Urology;  Laterality: N/A;  1hr    INJECTION FOR SENTINEL NODE IDENTIFICATION Right 10/29/2020    Procedure: INJECTION, FOR SENTINEL NODE IDENTIFICATION;  Surgeon: Baylee Kevin MD;  Location: [facility] OR [address] [place] [address];  Service: General;  Laterality: Right;    INJECTION OF JOINT Right 10/10/2019    Procedure: Injection, Joint RIGHT ILIOPSOAS BURSA/TENDON INJECTION AND RIGHT GLUTEAL TENDON INJECTION WITH STEROID AND LIDOCAINE;  Surgeon: Guillaume Rico MD;  Location: [facility];  Service: Pain Management;  Laterality: Right;  NEEDS CONSENT    INSERTION OF BREAST TISSUE EXPANDER Bilateral 10/29/2020    Procedure: INSERTION, TISSUE EXPANDER, BREAST;  Surgeon: Scottie Johnson MD;  Location: [address];  Service: Plastics;  Laterality: Bilateral;  Right breast: 1082 g  Left breast: 1076 g    LIPOSUCTION Bilateral 2/11/2021    Procedure: LIPOSUCTION;  Surgeon: Scottie Johnson MD;  Location: [facility] OR [address] [place] [address];  Service: Plastics;  Laterality: Bilateral;    mediastenoscopy      REPLACEMENT OF IMPLANT OF BREAST Bilateral 2/11/2021    Procedure: REPLACEMENT, IMPLANT, BREAST;  Surgeon: Scottie Johnson MD;  Location: [address];  Service: Plastics;  Laterality: Bilateral;    SENTINEL LYMPH NODE BIOPSY Right 10/29/2020    Procedure: BIOPSY, LYMPH NODE, SENTINEL;  Surgeon: Baylee Kevin MD;  Location: [address];  Service: General;  Laterality: Right;    TONSILLECTOMY N/A 1970    TUBAL LIGATION         Review of patient's allergies indicates:   Allergen Reactions    Lortab [hydrocodone-acetaminophen] Itching    Promethazine Itching and Other (See Comments)       Current Facility-Administered Medications on File Prior to Encounter   Medication    albuterol sulfate nebulizer solution 2.5 mg     Current Outpatient Medications on File Prior to Encounter   Medication Sig    acamprosate (CAMPRAL) 333 mg tablet Take 2 tablets (666 mg total) by mouth 3 (three) times daily. (Patient not taking: Reported on  2023)    acetaminophen (TYLENOL) 500 MG tablet Take 500-1,500 mg by mouth daily as needed for Pain.    anastrozole (ARIMIDEX) 1 mg Tab Take 1 tablet (1 mg total) by mouth every morning. (Patient not taking: Reported on 3/29/2023.)    ARIPiprazole (ABILIFY) 5 MG Tab Take 5 mg by mouth once daily.    cholecalciferol, vitamin D3, 125 mcg (5,000 unit) capsule Take 5,000 Units by mouth.    COMBIVENT RESPIMAT  mcg/actuation inhaler SMARTSI Puff(s) Via Inhaler Every 6 Hours PRN    dicyclomine (BENTYL) 20 mg tablet Take 20 mg by mouth 4 (four) times daily.    DULoxetine (CYMBALTA) 30 MG capsule Take 1 capsule (30 mg total) by mouth once daily. Take with 60mg =90mg QD    DULoxetine (CYMBALTA) 60 MG capsule Take 60 mg by mouth once daily.    fluticasone furoate-vilanteroL (BREO ELLIPTA) 100-25 mcg/dose diskus inhaler Inhale 1 puff into the lungs once daily. Controller    folic acid (FOLVITE) 1 MG tablet Take 1 tablet (1 mg total) by mouth once daily.    folic acid (FOLVITE) 1 MG tablet Take 1 tablet (1 mg total) by mouth once daily.    gabapentin (NEURONTIN) 600 MG tablet Take 1 tablet (600 mg total) by mouth 2 (two) times daily.    hydrOXYzine pamoate (VISTARIL) 50 MG Cap     isosorbide mononitrate (IMDUR) 30 MG 24 hr tablet Take 1 tablet (30 mg total) by mouth every evening.    levothyroxine (SYNTHROID) 50 MCG tablet Take 1 tablet (50 mcg total) by mouth before breakfast.    lisinopriL (PRINIVIL,ZESTRIL) 20 MG tablet Take 20 mg by mouth.    loperamide (IMODIUM) 2 mg capsule Take 2 mg by mouth 4 (four) times daily as needed for Diarrhea (Per package directions).    losartan (COZAAR) 100 MG tablet Take 1 tablet (100 mg total) by mouth once daily.    melatonin (MELATIN) TAKE 1/2 TABLET TO 1 TABLET BY MOUTH EVERY NIGHT AT BEDTIME    metFORMIN (GLUCOPHAGE-XR) 500 MG ER 24hr tablet Take 2 tablets (1,000 mg total) by mouth 2 (two) times daily with meals.    methocarbamoL (ROBAXIN) 750 MG Tab Take 750 mg by mouth 3  (three) times daily as needed (Pain).    multivitamin (THERAGRAN) per tablet Take 1 tablet by mouth once daily.    ondansetron (ZOFRAN ODT) 4 MG TbDL Take 1 tablet (4 mg total) by mouth every 6 (six) hours as needed (nausea).    pantoprazole (PROTONIX) 40 MG tablet Take 1 tablet (40 mg total) by mouth every morning.    propranoloL (INDERAL) 20 MG tablet Take 1 tablet (20 mg total) by mouth 2 (two) times daily.    thiamine 100 MG tablet Take 1 tablet (100 mg total) by mouth once daily.    thiamine 100 MG tablet Take 1 tablet (100 mg total) by mouth once daily.    traZODone (DESYREL) 300 MG tablet Take 1 tablet (300 mg total) by mouth every evening.     Family History       Problem Relation (Age of Onset)    Breast cancer Maternal Aunt, Daughter    Colon cancer Maternal Uncle    Diabetes Father, Mother    Heart attack Father    Hypertension Father, Mother          Tobacco Use    Smoking status: Former     Packs/day: 0.50     Years: 30.00     Pack years: 15.00     Types: Vaping with nicotine, Cigarettes     Quit date: 2021     Years since quittin.2    Smokeless tobacco: Never   Substance and Sexual Activity    Alcohol use: Yes     Comment: vodka daily (half a regular bottle)    Drug use: Yes     Types: Marijuana     Comment: gummies    Sexual activity: Yes     Birth control/protection: Surgical     Review of Systems   Unable to perform ROS: Other (Patient noncooperative)   Objective:     Vital Signs (Most Recent):  Temp: 98.8 °F (37.1 °C) (23)  Pulse: (!) 112 (23)  Resp: 20 (23)  BP: (!) 147/64 (23)  SpO2: 96 % (23)   Vital Signs (24h Range):  Temp:  [98.8 °F (37.1 °C)] 98.8 °F (37.1 °C)  Pulse:  [] 112  Resp:  [18-21] 20  SpO2:  [84 %-96 %] 96 %  BP: (108-147)/(64-76) 147/64        There is no height or weight on file to calculate BMI.    Physical Exam  Constitutional:       General: She is not in acute distress.     Appearance: She is ill-appearing  and toxic-appearing.   HENT:      Head: Normocephalic and atraumatic.      Mouth/Throat:      Mouth: Mucous membranes are moist.      Pharynx: Oropharynx is clear.   Cardiovascular:      Rate and Rhythm: Regular rhythm. Tachycardia present.      Pulses: Normal pulses.      Heart sounds: Normal heart sounds.   Pulmonary:      Effort: Pulmonary effort is normal. No respiratory distress.      Breath sounds: Wheezing present. No rales.   Abdominal:      General: Bowel sounds are normal. There is no distension.      Tenderness: There is abdominal tenderness. There is guarding.   Musculoskeletal:         General: No swelling.      Cervical back: Normal range of motion and neck supple.   Skin:     Capillary Refill: Capillary refill takes 2 to 3 seconds.      Coloration: Skin is not jaundiced.           Significant Labs: All pertinent labs within the past 24 hours have been reviewed.  A1C:   Recent Labs   Lab 12/23/22  0511 03/26/23  1656   HGBA1C 4.6 5.1     Blood Culture: No results for input(s): LABBLOO in the last 48 hours.  BMP:   Recent Labs   Lab 04/25/23 2113   GLU 54*      K 4.5   CL 91*   CO2 20*   BUN 35*   CREATININE 1.5*   CALCIUM 8.7   MG 2.0     CBC:   Recent Labs   Lab 04/25/23 2113   WBC 29.23*   HGB 11.6*   HCT 37.5   *     CMP:   Recent Labs   Lab 04/25/23 2113      K 4.5   CL 91*   CO2 20*   GLU 54*   BUN 35*   CREATININE 1.5*   CALCIUM 8.7   PROT 7.8   ALBUMIN 4.6   BILITOT 0.8   ALKPHOS 55   AST 65*   ALT 25   ANIONGAP 28*     Lactic Acid:   Recent Labs   Lab 04/25/23  2231   LACTATE 2.2     Lipase:   Recent Labs   Lab 04/25/23 2113   LIPASE 18     TSH:   Recent Labs   Lab 12/24/22  0556   TSH 0.820     Urine Culture: No results for input(s): LABURIN in the last 48 hours.  Urine Studies: No results for input(s): COLORU, APPEARANCEUA, PHUR, SPECGRAV, PROTEINUA, GLUCUA, KETONESU, BILIRUBINUA, OCCULTUA, NITRITE, UROBILINOGEN, LEUKOCYTESUR, RBCUA, WBCUA, BACTERIA, SQUAMEPITHEL,  HYALINECASTS in the last 48 hours.    Invalid input(s): ANDREY    Significant Imaging: I have reviewed all pertinent imaging results/findings within the past 24 hours.  I have reviewed and interpreted all pertinent imaging results/findings within the past 24 hours.

## 2023-04-26 NOTE — ED NOTES
Telemetry Verification   Patient placed on Telemetry Box  Verified with War Room  Box # [id number]   Monitor Tech War room   Rate 114   Rhythm Sinus tach

## 2023-04-26 NOTE — PLAN OF CARE
Plan of care discussed with patient. Patient is free of fall/trauma/injury. Denies CP, SOB. PRN nausea medication given this shift.  Mg and K replaced. All questions addressed. Will continue to monitor    Problem: Adult Inpatient Plan of Care  Goal: Plan of Care Review  Outcome: Ongoing, Progressing  Goal: Patient-Specific Goal (Individualized)  Outcome: Ongoing, Progressing  Goal: Absence of Hospital-Acquired Illness or Injury  Outcome: Ongoing, Progressing  Goal: Optimal Comfort and Wellbeing  Outcome: Ongoing, Progressing  Goal: Readiness for Transition of Care  Outcome: Ongoing, Progressing     Problem: Diabetes Comorbidity  Goal: Blood Glucose Level Within Targeted Range  Outcome: Ongoing, Progressing     Problem: Infection  Goal: Absence of Infection Signs and Symptoms  Outcome: Ongoing, Progressing  Intervention: Prevent or Manage Infection  Flowsheets (Taken 4/26/2023 1240)  Infection Management: aseptic technique maintained     Problem: Fall Injury Risk  Goal: Absence of Fall and Fall-Related Injury  Outcome: Ongoing, Progressing  Intervention: Identify and Manage Contributors  Flowsheets (Taken 4/26/2023 1240)  Self-Care Promotion: independence encouraged  Medication Review/Management:   medications reviewed   high-risk medications identified     Problem: Nausea and Vomiting  Goal: Fluid and Electrolyte Balance  Outcome: Ongoing, Progressing     Problem: Alcohol Withdrawal  Goal: Alcohol Withdrawal Symptom Control  Outcome: Ongoing, Progressing  Intervention: Minimize or Manage Alcohol Withdrawal Symptoms  Flowsheets (Taken 4/26/2023 1240)  Aspiration Precautions: awake/alert before oral intake     Problem: Coping Ineffective  Goal: Effective Coping  Outcome: Ongoing, Progressing  Intervention: Support and Enhance Coping Strategies  Flowsheets (Taken 4/26/2023 1240)  Supportive Measures: active listening utilized  Family/Support System Care: caregiver stress acknowledged  Environmental Support:   calm  environment promoted   environmental consistency promoted

## 2023-04-26 NOTE — PLAN OF CARE
[facility] - Med Surg  Initial Discharge Assessment       Primary Care Provider: Andrew Rodriguez MD    Admission Diagnosis: Alcoholic ketoacidosis [E87.29]  Nausea & vomiting [R11.2]  Chest pain [R07.9]  Acute hypoxemic respiratory failure [J96.01]    Admission Date: 4/25/2023  Expected Discharge Date: 4/28/2023    Discharge Barriers Identified: None    Payor: [employer] / Plan: [facility] ALL SAVERS / Product Type: Commercial /     Extended Emergency Contact Information  Primary Emergency Contact: Henrqiue Abdul  Address: [address] [place] [address]            [place] [address] [facility]  Home Phone: [phone]  Relation: Spouse  Secondary Emergency Contact: Carlos Suazo  Mobile Phone: [phone]  Relation: Son    Discharge Plan A: Home, Home with family  Discharge Plan B: Other (Referral for substance abuse treatment.)      [id number] - [place], [place] - [address] AT [facility] & [address] [address]  Phone: [phone] Fax: [phone]      Initial Assessment (most recent)       Adult Discharge Assessment - 04/26/23 1132          Discharge Assessment    Assessment Type Discharge Planning Assessment     Confirmed/corrected address, phone number and insurance Yes     Confirmed Demographics Correct on Facesheet     Source of Information patient     Communicated BECCA with patient/caregiver Yes     Reason For Admission alcohol withdrawals     People in Home spouse     Do you have help at home or someone to help you manage your care at home? Yes     Who are your caregiver(s) and their phone number(s)? Henrique [phone]     Prior to hospitilization cognitive status: Unable to Assess     Current cognitive status: Alert/Oriented     Equipment Currently Used at Home CPAP     Patient currently being followed by outpatient case management? No     Do you currently have service(s) that help you manage your care at home? No     Do you take  prescription medications? Yes     Do you have prescription coverage? Yes     Coverage [facility] All Savers     Do you have any problems affording any of your prescribed medications? No     Is the patient taking medications as prescribed? yes     Who is going to help you get home at discharge? Pt's  will provide transportation home.     How do you get to doctors appointments? family or friend will provide;car, drives self     Are you on dialysis? No     Do you take coumadin? No     Discharge Plan A Home;Home with family     Discharge Plan B Other   Referral for substance abuse treatment.    DME Needed Upon Discharge  other (see comments)   TBD    Discharge Plan discussed with: Patient     Discharge Barriers Identified None        Physical Activity    On average, how many days per week do you engage in moderate to strenuous exercise (like a brisk walk)? 0 days     On average, how many minutes do you engage in exercise at this level? 0 min        Financial Resource Strain    How hard is it for you to pay for the very basics like food, housing, medical care, and heating? Not very hard        Housing Stability    In the last 12 months, was there a time when you were not able to pay the mortgage or rent on time? No     In the last 12 months, was there a time when you did not have a steady place to sleep or slept in a shelter (including now)? No        Transportation Needs    In the past 12 months, has lack of transportation kept you from medical appointments or from getting medications? No     In the past 12 months, has lack of transportation kept you from meetings, work, or from getting things needed for daily living? No        Food Insecurity    Within the past 12 months, you worried that your food would run out before you got the money to buy more. Never true     Within the past 12 months, the food you bought just didn't last and you didn't have money to get more. Never true        Stress    Do you  feel stress - tense, restless, nervous, or anxious, or unable to sleep at night because your mind is troubled all the time - these days? To some extent        Social Connections    In a typical week, how many times do you talk on the phone with family, friends, or neighbors? Three times a week     How often do you get together with friends or relatives? Three times a week     Do you belong to any clubs or organizations such as Gnosticist groups, unions, fraternal or athletic groups, or school groups? No     How often do you attend meetings of the clubs or organizations you belong to? Never     Are you , , , , never , or living with a partner?         Alcohol Use    Q1: How often do you have a drink containing alcohol? 4 or more times a week     Q2: How many drinks containing alcohol do you have on a typical day when you are drinking? 3 or 4     Q3: How often do you have six or more drinks on one occasion? Daily or almost daily                   SW completed assessment with pt and obtained additional info from chart.  Pt has hospital readmissions.  Pt's family will provide transportation home.      Pt lives with her .  Pt independent with her ADLs and mobility.  Pt has family support at home.  Pt does not receive coumadin or dialysis.  Pt [facility] with [facility].      Pt reports substance use issues with alcohol.  Pt agreeable to obtaining substance abuse resources.  Pt is interested in substance abuse treatment [facility] and has reached out to them for assistance.  [phone]-Admissions-ask for Abbe Mckinney LMSW  PRN-  [facility]  Ext. [phone]

## 2023-04-26 NOTE — ASSESSMENT & PLAN NOTE
Patient with multiple readmissions related to alcohol use  Would benefit from psychiatric evaluation

## 2023-04-26 NOTE — NURSING
Patient received from ED at this time. Patient educated on how to use the call light and instructed to call with any needs they may have. VSS. Bed locked in the lowest position with personal items within reach. Refer to flowsheets for full assessment.

## 2023-04-27 PROBLEM — Z71.89 GOALS OF CARE, COUNSELING/DISCUSSION: Status: ACTIVE | Noted: 2023-04-27

## 2023-04-27 PROBLEM — Z51.5 PALLIATIVE CARE ENCOUNTER: Status: ACTIVE | Noted: 2023-04-27

## 2023-04-27 PROBLEM — Z71.89 ADVANCE CARE PLANNING: Status: ACTIVE | Noted: 2023-04-27

## 2023-04-27 LAB
ALBUMIN SERPL BCP-MCNC: 3.7 G/DL (ref 3.5–5.2)
ALP SERPL-CCNC: 45 U/L (ref 55–135)
ALT SERPL W/O P-5'-P-CCNC: 22 U/L (ref 10–44)
ANION GAP SERPL CALC-SCNC: 14 MMOL/L (ref 8–16)
AST SERPL-CCNC: 48 U/L (ref 10–40)
BASOPHILS # BLD AUTO: 0.02 K/UL (ref 0–0.2)
BASOPHILS NFR BLD: 0.5 % (ref 0–1.9)
BILIRUB SERPL-MCNC: 0.6 MG/DL (ref 0.1–1)
BUN SERPL-MCNC: 7 MG/DL (ref 8–23)
CALCIUM SERPL-MCNC: 8 MG/DL (ref 8.7–10.5)
CHLORIDE SERPL-SCNC: 97 MMOL/L (ref 95–110)
CO2 SERPL-SCNC: 24 MMOL/L (ref 23–29)
CREAT SERPL-MCNC: 0.7 MG/DL (ref 0.5–1.4)
DIFFERENTIAL METHOD: ABNORMAL
EOSINOPHIL # BLD AUTO: 0.1 K/UL (ref 0–0.5)
EOSINOPHIL NFR BLD: 1.2 % (ref 0–8)
ERYTHROCYTE [DISTWIDTH] IN BLOOD BY AUTOMATED COUNT: 16.7 % (ref 11.5–14.5)
EST. GFR  (NO RACE VARIABLE): >60 ML/MIN/1.73 M^2
GLUCOSE SERPL-MCNC: 93 MG/DL (ref 70–110)
HCT VFR BLD AUTO: 32.4 % (ref 37–48.5)
HGB BLD-MCNC: 9.7 G/DL (ref 12–16)
IMM GRANULOCYTES # BLD AUTO: 0.01 K/UL (ref 0–0.04)
IMM GRANULOCYTES NFR BLD AUTO: 0.2 % (ref 0–0.5)
LYMPHOCYTES # BLD AUTO: 2.6 K/UL (ref 1–4.8)
LYMPHOCYTES NFR BLD: 62 % (ref 18–48)
MAGNESIUM SERPL-MCNC: 1.9 MG/DL (ref 1.6–2.6)
MCH RBC QN AUTO: 27.4 PG (ref 27–31)
MCHC RBC AUTO-ENTMCNC: 29.9 G/DL (ref 32–36)
MCV RBC AUTO: 92 FL (ref 82–98)
MONOCYTES # BLD AUTO: 0.3 K/UL (ref 0.3–1)
MONOCYTES NFR BLD: 7.1 % (ref 4–15)
NEUTROPHILS # BLD AUTO: 1.2 K/UL (ref 1.8–7.7)
NEUTROPHILS NFR BLD: 29 % (ref 38–73)
NRBC BLD-RTO: 0 /100 WBC
PHOSPHATE SERPL-MCNC: 1.8 MG/DL (ref 2.7–4.5)
PLATELET # BLD AUTO: 59 K/UL (ref 150–450)
PMV BLD AUTO: 11 FL (ref 9.2–12.9)
POCT GLUCOSE: 100 MG/DL (ref 70–110)
POCT GLUCOSE: 115 MG/DL (ref 70–110)
POCT GLUCOSE: 134 MG/DL (ref 70–110)
POCT GLUCOSE: 97 MG/DL (ref 70–110)
POTASSIUM SERPL-SCNC: 4 MMOL/L (ref 3.5–5.1)
PROT SERPL-MCNC: 6.2 G/DL (ref 6–8.4)
RBC # BLD AUTO: 3.54 M/UL (ref 4–5.4)
SODIUM SERPL-SCNC: 135 MMOL/L (ref 136–145)
WBC # BLD AUTO: 4.21 K/UL (ref 3.9–12.7)

## 2023-04-27 PROCEDURE — 80053 COMPREHEN METABOLIC PANEL: CPT | Performed by: STUDENT IN AN ORGANIZED HEALTH CARE EDUCATION/TRAINING PROGRAM

## 2023-04-27 PROCEDURE — 21400001 HC TELEMETRY ROOM

## 2023-04-27 PROCEDURE — 99223 1ST HOSP IP/OBS HIGH 75: CPT | Mod: ,,, | Performed by: CLINICAL NURSE SPECIALIST

## 2023-04-27 PROCEDURE — 85025 COMPLETE CBC W/AUTO DIFF WBC: CPT | Performed by: STUDENT IN AN ORGANIZED HEALTH CARE EDUCATION/TRAINING PROGRAM

## 2023-04-27 PROCEDURE — 25000003 PHARM REV CODE 250: Performed by: STUDENT IN AN ORGANIZED HEALTH CARE EDUCATION/TRAINING PROGRAM

## 2023-04-27 PROCEDURE — 83735 ASSAY OF MAGNESIUM: CPT | Performed by: STUDENT IN AN ORGANIZED HEALTH CARE EDUCATION/TRAINING PROGRAM

## 2023-04-27 PROCEDURE — 99232 SBSQ HOSP IP/OBS MODERATE 35: CPT | Mod: ,,, | Performed by: STUDENT IN AN ORGANIZED HEALTH CARE EDUCATION/TRAINING PROGRAM

## 2023-04-27 PROCEDURE — 63600175 PHARM REV CODE 636 W HCPCS: Performed by: INTERNAL MEDICINE

## 2023-04-27 PROCEDURE — 99232 SBSQ HOSP IP/OBS MODERATE 35: CPT | Mod: ,,, | Performed by: PSYCHIATRY & NEUROLOGY

## 2023-04-27 PROCEDURE — 63600175 PHARM REV CODE 636 W HCPCS: Performed by: STUDENT IN AN ORGANIZED HEALTH CARE EDUCATION/TRAINING PROGRAM

## 2023-04-27 PROCEDURE — 99900035 HC TECH TIME PER 15 MIN (STAT)

## 2023-04-27 PROCEDURE — 84100 ASSAY OF PHOSPHORUS: CPT | Performed by: STUDENT IN AN ORGANIZED HEALTH CARE EDUCATION/TRAINING PROGRAM

## 2023-04-27 PROCEDURE — 94761 N-INVAS EAR/PLS OXIMETRY MLT: CPT

## 2023-04-27 PROCEDURE — 99232 PR SUBSEQUENT HOSPITAL CARE,LEVL II: ICD-10-PCS | Mod: ,,, | Performed by: STUDENT IN AN ORGANIZED HEALTH CARE EDUCATION/TRAINING PROGRAM

## 2023-04-27 PROCEDURE — 99232 PR SUBSEQUENT HOSPITAL CARE,LEVL II: ICD-10-PCS | Mod: ,,, | Performed by: PSYCHIATRY & NEUROLOGY

## 2023-04-27 PROCEDURE — 27000207 HC ISOLATION

## 2023-04-27 PROCEDURE — 36415 COLL VENOUS BLD VENIPUNCTURE: CPT | Performed by: STUDENT IN AN ORGANIZED HEALTH CARE EDUCATION/TRAINING PROGRAM

## 2023-04-27 PROCEDURE — 99223 PR INITIAL HOSPITAL CARE,LEVL III: ICD-10-PCS | Mod: ,,, | Performed by: CLINICAL NURSE SPECIALIST

## 2023-04-27 RX ORDER — CALCIUM CARBONATE 200(500)MG
500 TABLET,CHEWABLE ORAL 2 TIMES DAILY PRN
Status: DISCONTINUED | OUTPATIENT
Start: 2023-04-27 | End: 2023-04-30 | Stop reason: HOSPADM

## 2023-04-27 RX ADMIN — ACETAMINOPHEN 650 MG: 325 TABLET ORAL at 12:04

## 2023-04-27 RX ADMIN — ACETAMINOPHEN 650 MG: 325 TABLET ORAL at 01:04

## 2023-04-27 RX ADMIN — DULOXETINE HYDROCHLORIDE 60 MG: 60 CAPSULE, DELAYED RELEASE ORAL at 08:04

## 2023-04-27 RX ADMIN — PROCHLORPERAZINE EDISYLATE 2.5 MG: 5 INJECTION INTRAMUSCULAR; INTRAVENOUS at 04:04

## 2023-04-27 RX ADMIN — ARIPIPRAZOLE 5 MG: 5 TABLET ORAL at 08:04

## 2023-04-27 RX ADMIN — DIAZEPAM 10 MG: 5 TABLET ORAL at 02:04

## 2023-04-27 RX ADMIN — LORAZEPAM 2 MG: 2 INJECTION INTRAMUSCULAR; INTRAVENOUS at 02:04

## 2023-04-27 RX ADMIN — ACETAMINOPHEN 650 MG: 325 TABLET ORAL at 08:04

## 2023-04-27 RX ADMIN — DIAZEPAM 10 MG: 5 TABLET ORAL at 05:04

## 2023-04-27 RX ADMIN — LORAZEPAM 2 MG: 2 INJECTION INTRAMUSCULAR; INTRAVENOUS at 06:04

## 2023-04-27 RX ADMIN — LORAZEPAM 2 MG: 2 INJECTION INTRAMUSCULAR; INTRAVENOUS at 08:04

## 2023-04-27 RX ADMIN — LORAZEPAM 2 MG: 2 INJECTION INTRAMUSCULAR; INTRAVENOUS at 10:04

## 2023-04-27 RX ADMIN — LORAZEPAM 2 MG: 2 INJECTION INTRAMUSCULAR; INTRAVENOUS at 12:04

## 2023-04-27 RX ADMIN — ONDANSETRON 4 MG: 2 INJECTION INTRAMUSCULAR; INTRAVENOUS at 08:04

## 2023-04-27 RX ADMIN — LISINOPRIL 20 MG: 20 TABLET ORAL at 08:04

## 2023-04-27 RX ADMIN — DIAZEPAM 10 MG: 5 TABLET ORAL at 09:04

## 2023-04-27 RX ADMIN — FOLIC ACID 1 MG: 1 TABLET ORAL at 08:04

## 2023-04-27 RX ADMIN — LEVOTHYROXINE SODIUM 50 MCG: 50 TABLET ORAL at 05:04

## 2023-04-27 RX ADMIN — SODIUM PHOSPHATE, MONOBASIC, MONOHYDRATE AND SODIUM PHOSPHATE, DIBASIC, ANHYDROUS 20.01 MMOL: 142; 276 INJECTION, SOLUTION INTRAVENOUS at 09:04

## 2023-04-27 RX ADMIN — TRAZODONE HYDROCHLORIDE 200 MG: 100 TABLET ORAL at 09:04

## 2023-04-27 RX ADMIN — Medication 100 MG: at 08:04

## 2023-04-27 NOTE — CONSULTS
[facility] - [facility] Surg  Palliative Medicine  Consult Note    Patient Name: Earl Abdul  MRN: [id number]  Admission Date: 4/25/2023  Hospital Length of Stay: 1 days  Code Status: Full Code   Attending Provider: Susana Moreno MD  Consulting Provider: MENG Cates  Primary Care Physician: Andrew Rodriguez MD  Principal Problem:Alcohol withdrawal    Patient information was obtained from primary team.      Inpatient consult to Palliative Care  Consult performed by: MENG Clarke  Consult ordered by: Gallito Crenshaw DO        Assessment/Plan:     Palliative Care  Palliative care encounter  Impression: Pt is a 63 y/o female admitted wit alcohol withdrawal. 62 year old female with a past medical history of tobacco use, alcohol abuse, depression with suicidal attempt (2017), history of breast cancer, CLL (dx on 6/2022, not on treatment), fatty liver disease.  She presented to the ER with nausea and vomiting for several days per ER.  Pt is alert and oriented to person, place, and situation. Pt reports she is sleepy. Pt is a full code.     Reason for consult: ACP Communicated with Dr. Crenshaw.     Goals of care/ACP:     Explained role of pal care.     Met with pt who is here for nausea/alcohol withdrawal. Per pt, she saw psych yesterday. She is open to rehab. Per pt, rehab helps for short periods of time but she is open to trying again. Pt's gaol is to continue to receive medical treatment. She is open to f/u with Hem/Onc for CLL.     Pt reports she lives at home with her her  but unclear from chart if he left a week ago.   MPOA education discussed. Per pt, she wants her spouse to be her medical decision-maker.     Symptom management:     Pt reports fatigue.     Nausea:  Pt reports nausea throughout day.   Current meds:  Pt on Zofran (first choice)  Phenergan (second choice)    Pt reports medication helps with nausea and improving.     Alcohol withdrawal  Continue CIWA-Ar monitoring  Given  thiamine 100mg IV x 1 and start on daily thiamine, folic acid  Start on librium 50mg q8hr x 4 doses, then 25mg q6hr PRN  Ativan 1mg IV PRN for breakthru withdrawal symptoms  Monitor for seizure like activity    Plan:   Will have palliative care SW se pt for psychosocial evaluation.                Thank you for your consult. I will follow-up with patient. Please contact us if you have any additional questions.    Subjective:     HPI:   Pt is a 62 year old female with a past medical history of tobacco use, alcohol abuse, depression with suicidal attempt (2017), history of breast cancer, CLL (dx on 6/2022, not on treatment), fatty liver disease.  She presented to the ER with nausea and vomiting for several days per ER.  She was discharged from her previous hospitalization about 3 weeks ago and states that she stopped drinking for about a week, and then began drinking again about 2 weeks prior to admission.  Her last drink was at 10 AM the day of admission.  She also continued to smoke cigarettes.  She reported abdominal pain, nausea, vomiting, diarrhea, and shortness of breath.  Denies any fevers.  Further history and ROS is limited due to patient uncooperative with exam and sleeping.        Upon chart review she has a history of significant alcohol withdrawal symptoms including seizures that the patient reports.  In the ER she was noted to be actively vomiting.  She appeared ill and short of breath, placed on nasal cannula.  She was tachycardic to the 120s, sinus tach.  Labs remarkable for a large anion gap of 28.  Patient with an DEVANTE, and hypoglycemic to the 50s on chemistry.  Alcohol level of 86, BOHB level of 7.  WBC elevated to 29K with a lymphocytic predominance.       She is admitted to hospital medicine for monitoring for alcohol withdrawals.              Hospital Course:  No notes on file    Interval History: Pt admitted with alcohol withdrawal.     Past Medical History:   Diagnosis Date    Anemia     Anemia      Controlled type 2 diabetes mellitus without complication, without long-term current use of insulin 11/30/2021    COPD (chronic obstructive pulmonary disease)     Depression     Diverticulitis     Fatty liver     GERD (gastroesophageal reflux disease)     Hyperlipidemia     Hypertension     Pancreatitis     Peptic ulcer disease     Polysubstance abuse     Posterior reversible encephalopathy syndrome     Sarcoidosis of lung     Sarcoidosis of lung     over 30 yrs ago    Seizures     7/2017    Suicide attempt     Suicide ideation        Past Surgical History:   Procedure Laterality Date    APPENDECTOMY      BILATERAL MASTECTOMY Bilateral 10/29/2020    Procedure: MASTECTOMY, BILATERAL;  Surgeon: Baylee Kevin MD;  Location: [facility] OR [address] [place] [address];  Service: General;  Laterality: Bilateral;    BREAST REVISION SURGERY Bilateral 2/11/2021    Procedure: BREAST REVISION SURGERY;  Surgeon: Scottie Johnson MD;  Location: [facility] OR [address] [place] [address];  Service: Plastics;  Laterality: Bilateral;    COLONOSCOPY N/A 7/28/2017    Procedure: COLONOSCOPY;  Surgeon: Aaron Alvarado MD;  Location: [facility];  Service: Endoscopy;  Laterality: N/A;    ESOPHAGOGASTRODUODENOSCOPY  10/7/2016, 11/6/2014    2016 - gastritis, duodenitis, 2014 erosive gastritis    ESOPHAGOGASTRODUODENOSCOPY N/A 2/11/2020    Procedure: ESOPHAGOGASTRODUODENOSCOPY (EGD);  Surgeon: Fawn Garrido MD;  Location: [facility];  Service: Endoscopy;  Laterality: N/A;    ESOPHAGOGASTRODUODENOSCOPY N/A 4/19/2021    Procedure: EGD (ESOPHAGOGASTRODUODENOSCOPY);  Surgeon: Paramjit Martino MD;  Location: [facility];  Service: Endoscopy;  Laterality: N/A;    FLEXIBLE SIGMOIDOSCOPY  11/06/2014    colitis    HYSTERECTOMY      IMPLANTATION OF PERMANENT SACRAL NERVE STIMULATOR N/A 7/12/2022    Procedure: INSERTION, NEUROSTIMULATOR, PERMANENT, SACRAL;  Surgeon: Juaquin Edwards MD;  Location: [facility] OR [address] [place] [address];  Service: Urology;  Laterality: N/A;  1hr     INJECTION FOR SENTINEL NODE IDENTIFICATION Right 10/29/2020    Procedure: INJECTION, FOR SENTINEL NODE IDENTIFICATION;  Surgeon: Baylee Kevin MD;  Location: [address];  Service: General;  Laterality: Right;    INJECTION OF JOINT Right 10/10/2019    Procedure: Injection, Joint RIGHT ILIOPSOAS BURSA/TENDON INJECTION AND RIGHT GLUTEAL TENDON INJECTION WITH STEROID AND LIDOCAINE;  Surgeon: Guillaume Rico MD;  Location: [facility];  Service: Pain Management;  Laterality: Right;  NEEDS CONSENT    INSERTION OF BREAST TISSUE EXPANDER Bilateral 10/29/2020    Procedure: INSERTION, TISSUE EXPANDER, BREAST;  Surgeon: Scottie Johnson MD;  Location: [address];  Service: Plastics;  Laterality: Bilateral;  Right breast: 1082 g  Left breast: 1076 g    LIPOSUCTION Bilateral 2/11/2021    Procedure: LIPOSUCTION;  Surgeon: Scottie Johnson MD;  Location: [address];  Service: Plastics;  Laterality: Bilateral;    mediastenoscopy      REPLACEMENT OF IMPLANT OF BREAST Bilateral 2/11/2021    Procedure: REPLACEMENT, IMPLANT, BREAST;  Surgeon: Scottie Johnson MD;  Location: [address];  Service: Plastics;  Laterality: Bilateral;    SENTINEL LYMPH NODE BIOPSY Right 10/29/2020    Procedure: BIOPSY, LYMPH NODE, SENTINEL;  Surgeon: Baylee Kevin MD;  Location: [address];  Service: General;  Laterality: Right;    TONSILLECTOMY N/A 1970    TUBAL LIGATION         Review of patient's allergies indicates:   Allergen Reactions    Lortab [hydrocodone-acetaminophen] Itching    Promethazine Itching and Other (See Comments)       Medications:  Continuous Infusions:  Scheduled Meds:   ARIPiprazole  5 mg Oral Daily    diazePAM  10 mg Oral Q8H    DULoxetine  60 mg Oral Daily    folic acid  1 mg Oral Daily    levothyroxine  50 mcg Oral Before breakfast    lisinopriL  20 mg Oral Daily    thiamine  100 mg Oral Daily    traZODone  200 mg Oral QHS     PRN Meds:acetaminophen, dextrose 10%,  dextrose 10%, dextrose, dextrose, glucagon (human recombinant), lorazepam, melatonin, naloxone, ondansetron, prochlorperazine, sodium chloride 0.9%, sodium chloride 0.9%    Family History       Problem Relation (Age of Onset)    Breast cancer Maternal Aunt, Daughter    Colon cancer Maternal Uncle    Diabetes Father, Mother    Heart attack Father    Hypertension Father, Mother          Tobacco Use    Smoking status: Former     Packs/day: 0.50     Years: 30.00     Pack years: 15.00     Types: Vaping with nicotine, Cigarettes     Quit date: 2021     Years since quittin.2    Smokeless tobacco: Never   Substance and Sexual Activity    Alcohol use: Yes     Comment: vodka daily (half a regular bottle)    Drug use: Yes     Types: Marijuana     Comment: gummies    Sexual activity: Yes     Birth control/protection: Surgical       Review of Systems   Constitutional:  Positive for fatigue.   HENT:  Negative for trouble swallowing.    Respiratory:  Negative for shortness of breath.    Gastrointestinal:  Positive for nausea.   Psychiatric/Behavioral:  Positive for decreased concentration.    Objective:     Vital Signs (Most Recent):  Temp: 97.2 °F (36.2 °C) (23 0845)  Pulse: 81 (23 0845)  Resp: (!) 21 (23 0845)  BP: (!) 155/80 (23 0845)  SpO2: 96 % (23 0845)   Vital Signs (24h Range):  Temp:  [97.2 °F (36.2 °C)-98.3 °F (36.8 °C)] 97.2 °F (36.2 °C)  Pulse:  [] 81  Resp:  [16-21] 21  SpO2:  [91 %-99 %] 96 %  BP: (121-162)/(58-80) 155/80     Weight: 78.3 kg (172 lb 9.9 oz)  Body mass index is 27.04 kg/m².    Physical Exam  Constitutional:       General: She is not in acute distress.  HENT:      Head: Normocephalic and atraumatic.   Pulmonary:      Effort: Pulmonary effort is normal. No respiratory distress.   Musculoskeletal:      Cervical back: Full passive range of motion without pain and normal range of motion.   Skin:     General: Skin is warm and dry.   Neurological:      Mental  Status: She is alert and oriented to person, place, and time.      Comments: Pt is alert when speaking to her but reports she feels very sleepy.       Review of Symptoms      Symptom Assessment (ESAS 0-10 Scale)  Pain:  0  Dyspnea:  0  Anxiety:  0  Nausea:  4  Depression:  0  Anorexia:  0  Fatigue:  0  Insomnia:  0  Restlessness:  0  Agitation:  0         Psychosocial/Cultural:   See Palliative Psychosocial Note: No  Pt is . Unclear if spouse living with her or left her a few weeks ago. Pt reports she has a son.  Pt reports to call her  if she is unable to make medical decisions.   **Primary  to Follow**  Palliative Care  Consult: Yes    Spiritual:  A - Address in Care:  Pt reports she does NOT want a  visit.       Advance Care Planning   Advance Directives:   Living Will: No    Do Not Resuscitate Status: No    Medical Power of : No    Agent's Name:  Spouse is NOK    Decision Making:  Patient answered questions  Goals of Care: The patient endorses that what is most important right now is to focus on curative/life-prolongation (regardless of treatment burdens)    Accordingly, we have decided that the best plan to meet the patient's goals includes continuing with treatment       Significant Labs: All pertinent labs within the past 24 hours have been reviewed.  CBC:   Recent Labs   Lab 04/26/23  0340   WBC 17.37*   HGB 9.6*   HCT 31.3*   MCV 89   PLT 81*     BMP:  No results for input(s): GLU, NA, K, CL, CO2, BUN, CREATININE, CALCIUM, MG in the last 24 hours.  LFT:  Lab Results   Component Value Date    AST 48 (H) 04/26/2023     (H) 04/01/2011    ALKPHOS 48 (L) 04/26/2023    BILITOT 0.7 04/26/2023     Albumin:   Albumin   Date Value Ref Range Status   04/26/2023 3.7 3.5 - 5.2 g/dL Final     Protein:   Total Protein   Date Value Ref Range Status   04/26/2023 6.1 6.0 - 8.4 g/dL Final     Lactic acid:   Lab Results   Component Value Date    LACTATE 2.2  04/25/2023    LACTATE 2.0 03/26/2023       Significant Imaging: I have reviewed all pertinent imaging results/findings within the past 24 hours.        > 50% of 75 min visit spent in chart review, face to face discussion of goals of care, charting, discussion with attedning team, symptom assessment, coordination of care and emotional support.    Flakita Welch, CNS  Palliative Medicine  [facility] S[facility]

## 2023-04-27 NOTE — ASSESSMENT & PLAN NOTE
Patient with significantly elevated anion gap, BOHB elevated  Alcoholic ketosis, hypoglycemia.  Monitor for refeeding syndrome, daily phos- replete PRN  Given thiamine 100mg IV x 1, cont daily therapy

## 2023-04-27 NOTE — SUBJECTIVE & OBJECTIVE
Interval History: Patient reports feeling ok today, sleepy. Endorses nonbloody diarrhea in diaper. N.V controlled with PRNs tolerating PO.   CIWA 16 this morning, PRN ativan given.   Tolerating valium taper, psych following. Updated  at bedside.     Review of Systems   Constitutional:  Negative for chills and fever.   HENT:  Negative for trouble swallowing.    Eyes:  Negative for visual disturbance.   Respiratory:  Negative for shortness of breath.    Cardiovascular:  Negative for chest pain.   Gastrointestinal:  Positive for diarrhea. Negative for abdominal pain.   Genitourinary:  Negative for difficulty urinating.   Musculoskeletal:  Negative for neck stiffness.   Skin:  Negative for rash.   Neurological:  Negative for light-headedness.   Psychiatric/Behavioral:  Negative for confusion.    Objective:     Vital Signs (Most Recent):  Temp: 98 °F (36.7 °C) (04/27/23 1152)  Pulse: 95 (04/27/23 1506)  Resp: 17 (04/27/23 1200)  BP: (!) 154/79 (04/27/23 1200)  SpO2: 97 % (04/27/23 1200)   Vital Signs (24h Range):  Temp:  [97.2 °F (36.2 °C)-98.3 °F (36.8 °C)] 98 °F (36.7 °C)  Pulse:  [74-95] 95  Resp:  [17-21] 17  SpO2:  [91 %-97 %] 97 %  BP: (121-162)/(61-80) 154/79     Weight: 78.3 kg (172 lb 9.9 oz)  Body mass index is 27.04 kg/m².    Intake/Output Summary (Last 24 hours) at 4/27/2023 1818  Last data filed at 4/27/2023 1300  Gross per 24 hour   Intake 475 ml   Output --   Net 475 ml      Physical Exam  Vitals and nursing note reviewed.   Constitutional:       General: She is not in acute distress.     Appearance: Normal appearance.   HENT:      Head: Normocephalic and atraumatic.      Right Ear: External ear normal.      Left Ear: External ear normal.      Nose: Nose normal.      Mouth/Throat:      Mouth: Mucous membranes are moist.      Pharynx: Oropharynx is clear.   Eyes:      Extraocular Movements: Extraocular movements intact.      Conjunctiva/sclera: Conjunctivae normal.      Pupils: Pupils are equal,  round, and reactive to light.   Cardiovascular:      Rate and Rhythm: Normal rate and regular rhythm.      Pulses: Normal pulses.      Heart sounds: Normal heart sounds.   Pulmonary:      Effort: Pulmonary effort is normal.      Breath sounds: Normal breath sounds.   Abdominal:      General: Abdomen is flat. Bowel sounds are normal.      Palpations: Abdomen is soft.   Musculoskeletal:         General: Normal range of motion.      Cervical back: Normal range of motion and neck supple.   Skin:     General: Skin is warm and dry.   Neurological:      General: No focal deficit present.      Mental Status: She is alert and oriented to person, place, and time.   Psychiatric:         Mood and Affect: Mood normal.         Behavior: Behavior normal.       Significant Labs: All pertinent labs within the past 24 hours have been reviewed.    Significant Imaging: I have reviewed all pertinent imaging results/findings within the past 24 hours.

## 2023-04-27 NOTE — PLAN OF CARE
Advance Care Planning   [facility]  Palliative Care   Psychosocial Assessment    Patient Name: Earl Abdlu  MRN: [id number]  Admission Date: 4/25/2023  Hospital Length of Stay: 1 days  Code Status: Full Code   Attending Provider: Susana Moreno MD  Palliative Care Provider:   Primary Care Physician: Andrew Rodriguez MD  Principal Problem:Alcohol withdrawal    Reason for Referral:  Advanced Care Planning and Psychosocial Support       Present during Interview: patient and ER records.      Primary Language:English   Needed: no      Past Medical Situation:   PMH:   Past Medical History:   Diagnosis Date    Anemia     Anemia     Controlled type 2 diabetes mellitus without complication, without long-term current use of insulin 11/30/2021    COPD (chronic obstructive pulmonary disease)     Depression     Diverticulitis     Fatty liver     GERD (gastroesophageal reflux disease)     Hyperlipidemia     Hypertension     Pancreatitis     Peptic ulcer disease     Polysubstance abuse     Posterior reversible encephalopathy syndrome     Sarcoidosis of lung     Sarcoidosis of lung     over 30 yrs ago    Seizures     7/2017    Suicide attempt     Suicide ideation      Mental Health/Substance Use History: Patient reports long-standing history of depression, anxiety, and ETOH use disorder   Risk of Abuse, neglect or exploitation: none identified   Current or Previous Trauma and/or evidence of PTSD: none identified   Non-traditional Health practices: None identified     Understanding of diagnosis and prognosis: Limited   Experience/Comfort level with health care system: Fair     Patients Mental Status: Alert and oriented to person, place, time and situation with depressed mood     Socio-Economic Factors/Resources:  Address: [facility] [address] [place] [address]  Phone Number: [phone] (home)     Marital Status:   Household composition: Patient resided in home shared with spouse prior  "to admit  Children: Two sons (one passed in )    Patient/Family perceptions about Caregiving Needs; availability and capacity: Patient has tenuous relationships with her spouse and, thus limiting available supports if needed upon discharge.     Family Dynamics/Relationships: Strained and complicated     Patient/Family Strengths/Resilience: Patient openly reflected upon her recent/past struggles and is open to receiving further support in outpatient setting.   Patient/Family Coping: Patient admits to using ETOH to cope with her social anxiety.     Activities of Daily Living: Independent   Support Systems-Family & Community (Home Health, HME etc): n/a     Transportation:  yes    Work/Education History: self-employed  Self-Care Activities/Hobbies: Painting      History: no    Financial Resources:Commercial      Advanced Care Planning & Legal Concerns:   Advanced Directives/Living Will: no  [place]ST: no   Planning:  no    Power of : no    Emergency Contacts: Henrique Abdul/Spouse     Spirituality, Culture & Coping Mechanisms:  F- Naty and Belief: Rastafari     I - Importance: not discussed     C - Community/Culture Values: n/a      A - Address in Care: Patient declines per [facility] CNS       Goals/Hopes/Expectations: Patient hopes to become "inspired" to paint and gain better control of her health.   Fears/Anxiety/Concerns: Patient admits to feeling "stuck" and "lonely"         Complicated Bereavement Risk Assessment Tool (CBRAT)  Reference:  [facility] Palliative [facility] Clinical Practice Group (May 2016). Bereavement Risk Screening and Management Guidelines.  Retrieved from: http://www.[facility].com.au/wp-content/uploads//ZGQFK-Vxhvalxwvds-Zkjhynjhx-and-Management-Guideline-2016.pdf      Bereaved Client Characteristics   Under 18      no  Was a Twin   no  Young Spouse   no  Elderly Spouse    no  Isolated    no  Lacks Meaningful Social Support   no  Dissatisfied with help " "available during illness   no  New to Financial [place] no  New to Decision-Making   no    Illness  Inherited Disorder   no  Stigmatized Disease in the family/community   yes  Lengthy/Burdensome   no     Bereaved Client's History of Loss   Cumulative Multiple Losses   yes  Previous Mental Health Illnesses   no  Current Mental Health Illness   no  Other Significant Health Issues   no   Migrant/Refugee   no Death  Sudden or Unexpected   no  Traumatic Circumstances Associated with Death   no  Significant Cultural/Social Burdens as a result of Death   yes   Relationship with   Profound Lifelong Partner   yes  Highly Dependent    no  Antagonistic   yes  Ambivalent   no  Deeply Connected   yes  Culturally Defined   yes   Risk Factors Scores  0-2  Low  3-5  Moderate  5+  High  All persons scoring moderate to high presume to be at risk**    (** It is acknowledged that protective factors and resilience may outweigh apparent risk factors.      Total Risk Factors Score:  Moderate     Plan of Care:     SW met with patient at bedside for psychosocial assessment. Patient presents AAOx4 with depressed affect.  Patient reports she is unsure what is going on from a medical standpoint. Patient acknowledges having a dependence on alcohol which she reports began about ten years ago. Patient notes she has high social anxiety and drinking caused her to feel at ease during nightly dinners with her spouse, his co-workers and friends. Patient reports her relationship with her spouse is tenuous, noting the two have not been sexually active in eighteen years. Patient admits to feeling resentful that spouse has not worked to improve his impotence. Furthermore patient reports her spouse is away from home for days at a time due to work. Patient reports their most recent argument occurred when she had "two drinks" at a wedding last Friday. Patient notes she "believes she is still " and adds her spouse visited her at " "bedside this morning.      Patient acknowledges the negative effect her alcohol use has had on her life. Patient intends to re-enroll in treatment at [facility], where she has sought treatment in the past. Furthermore patient reports she was previously in psychotherapy with "Geoffrey Myers" however has not seen as of late as this clinician is "out of town". Patient eludes to feeling "stuck"as it relates to her marriage and creative endeavors. Patient reports she has lost contact with many of her friends, due to differences in socioeconomic status upon marrying her spouse/Henrique. ("Things got weird because he has money.") Patient adds she does not have a good relationship with her mother-in-law ("She made me sign a prenup") or her only living son (Patient reports she is not liked by her daughter-in-law).     Patient recognizes she is aging and needs to pay closer attention to her health. Patient states "I was reminded I have cancer." Patent inquired regarding her plan for cancer treatment. SW noted patient would follow-up with outpatient oncology. Patient expressed frustration as she wishes for "whatever needs to be done should be done while I'm here."     Patient denies further psychosocial concerns and expressed gratitude for the visit. Patient would benefit from further psychotherapy (either with previous clinician or new provider) upon discharge. This writer to consult with [facility] CNS regarding referral to [facility] DSW. SW will continue to follow.    Yoly Dias, DEMIW    Palliative Medicine                    "

## 2023-04-27 NOTE — HPI
Pt is a 62 year old female with a past medical history of tobacco use, alcohol abuse, depression with suicidal attempt (2017), history of breast cancer, CLL (dx on 6/2022, not on treatment), fatty liver disease.  She presented to the ER with nausea and vomiting for several days per ER.  She was discharged from her previous hospitalization about 3 weeks ago and states that she stopped drinking for about a week, and then began drinking again about 2 weeks prior to admission.  Her last drink was at 10 AM the day of admission.  She also continued to smoke cigarettes.  She reported abdominal pain, nausea, vomiting, diarrhea, and shortness of breath.  Denies any fevers.  Further history and ROS is limited due to patient uncooperative with exam and sleeping.        Upon chart review she has a history of significant alcohol withdrawal symptoms including seizures that the patient reports.  In the ER she was noted to be actively vomiting.  She appeared ill and short of breath, placed on nasal cannula.  She was tachycardic to the 120s, sinus tach.  Labs remarkable for a large anion gap of 28.  Patient with an DEVANTE, and hypoglycemic to the 50s on chemistry.  Alcohol level of 86, BOHB level of 7.  WBC elevated to 29K with a lymphocytic predominance.       She is admitted to hospital medicine for monitoring for alcohol withdrawals.

## 2023-04-27 NOTE — MEDICAL/APP STUDENT
"      FOLLOW UP VISIT: ADDICTION PSYCHIATRY CONSULTATION SERVICE      ASSESSMENT AND PLAN:     DIAGNOSES & PROBLEMS:  Alcohol use disorder, severe  Tobacco use disorder  Depression, unspecified    In Summary:  Pt is a 62 y.o F w/ PMH of tobacco use, alcohol abuse, and depression presenting due to c/o nausea, vomiting, diarrhea. Pt states she's been drinking vodka daily for many years off and on. She reports withdrawal symptoms with c/o n/v, weakness, shaking. She does have a hx of complicated withdrawals and seizures. Had a period of sobriety for 6 months prior to relapsing roughly 3 weeks ago because of marital conflicts. She has been "in and out of" rehab, detox, and AA meetings and is interested in attempting to quit again, open to outpatient rehab. Also states she's been smoking for many years off, usually cigarettes <1/2 ppd or vaping. She does not feel ready to quit tobacco at this time. Pt used to see a psychiatrist for her depression but stopped because "it's expensive" but is willing to go back. Would recommend restarting home meds and continuing benzo taper with alcohol withdrawals.       Plan:  - continue benzo taper per primary - librium or valium recommended  - PRN ativan per CIWA protocol   - continue home cymbalta 60, abilify 5, trazodone 200  - pt counseled on substance cessation, resources provided        - continue alcohol withdrawal protocol while patient is in house, including supportive measures,frequent monitoring of vitals and CIWA-Ar, and initiation of PRN +/- standing benzodiazepines to minimize risk of complicated withdrawal  - patient counseled on abstinence from alcohol and substances of abuse (illicit and prescription)  - counseled on full engagement in 12 step (or equivalent) recovery program(s), including acquisition of a sponsor  - addiction resource sheet provided to patient  - relapse prevention and motivational interviewing provided       {Management Options:35665::" " ""}      INTERVAL HISTORY AND ROS:     Pt was asleep and resting comfortably. Pt was difficult to wake and would often fall back asleep. She reports feeling better than yesterday but still having a bit of nausea and diarrhea. She denied SI/HI/AVH and denied having any other alcohol withdrawal symptoms. Oriented to person place and time. Since pt was somnolent today, will continue discussion of rehab tomorrow.     CURRENT PSYCHOTROPIC REGIMEN:  Cymbalta 60 daily   Trazodone 200 qhs   Abilify 5 daily       PERTINENT PAST HISTORY AND CHART REVIEW:     Please see consult notes and H+P.     Resumed her home meds. Abilify 5mg and Cymbalta 60mg this am and Trazodone 200mg qhs last night.   Received benzo taper, valium 10mg q8h as scheduled.       EXAMINATION:     /61 (BP Location: Left arm, Patient Position: Lying)   Pulse 74   Temp 97.6 °F (36.4 °C) (Axillary)   Resp 18   Ht 5' 7" (1.702 m)   Wt 78.3 kg (172 lb 9.9 oz)   SpO2 (!) 91%   Breastfeeding No   BMI 27.04 kg/m²     MENTAL STATUS EXAMINATION:  General Appearance & Behavior: **  adequately groomed, appropriately dressed, in no apparent distress, under good behavioral control  Involuntary Movements and Motor Activity: **  no abnormal involuntary movements noted, no psychomotor agitation or retardation  Speech & Language: **   mumbling and half-asleep   Mood: unable to assess, pt mumbled in response   Affect: **  appropriate to situation and context  Thought Process & Associations: **  linear  Thought Content & Perceptions: **  no suicidal or homicidal ideation, no evidence of psychosis  Sensorium and Cognition: **  drowsy, was half-asleep  Insight & Judgment: **  demonstrates awareness of illness and situation      RISK MANAGEMENT:     The following risk parameters were assessed during this evaluation:    I[]I Y  I[x]I N  I[]I U  I[]I A  Suicidal Ideation/Behavior: **   I[]I Y  I[x]I N  I[]I U  I[]I A  Homicidal Ideation/Behavior: **  I[]I Y  I[x]I N  " I[]I U  I[]I A  Violence: **  I[]I Y  I[x]I N  I[]I U  I[]I A  Self-Injurious Behavior: **    The patient is deemed to be a reliable and factually accurate historian.    I[]I Y  I[x]I N  I[]I U  I[]I A  I[]I N/A  Minimization of Risk Parameters Suspected/Evident: **  I[]I Y  I[x]I N  I[]I U  I[]I A  I[]I N/A  Exaggeration of Risk Parameters Suspected/Evident: **    [] Y  [x] N  Danger to Self:   [] Y  [x] N  Danger to Others:   [] Y  [x] N  Grave Disability:     The patient does not currently meet the criteria for psychiatric admission and can be safely and effectively managed in a less restrictive level of care.    In cases of emergency, daily coverage provided by Acute/ER Psych MD, NP, PA, or SW, with contact numbers located in [facility] On Call Schedule.    Jami Meraz  Department of Psychiatry  [facility]        KEY:     I[]I Y = Yes / Present / Endorses  I[]I N = No / Absent / Denies  I[]I U = Unknown / Unable to Assess / Unwilling to Participate  I[]I A = Ambiguity Exists / Accuracy Uncertain  I[]I D = Denial or Minimization is Suspected/Evident  I[]I N/A = Non-Applicable    CHART REVIEW:     Available documentation has been reviewed, and pertinent elements of the chart - including previous psychiatric evaluations - have been incorporated into this evaluation where appropriate.    ADVICE AND COUNSELING:     [x] In cases of emergencies (e.g. SI/HI resulting in danger to self or others, functioning deteriorates to the level of grave disability), call 911 or [phone], or present to the emergency department for immediate assistance.  [x] Patient should not operate a motor vehicle or heavy machinery if effects of medications or underlying symptoms/condition impair the ability to safely do so.    Alcohol, Tobacco, and Drug Counseling, as well as resources, has been provided, as warranted.     Shared medical decision making and informed consent are the hallmark and bedrock of good clinical care,  and as such have been employed and obtained, respectively, to the degree possible.      Risk Mitigation Strategies, Harm Reduction Techniques, and Safety Netting are important interventions that can reduce acute and chronic risk, and as such have been employed to the degree possible.    Prescription Drug Management entails the review, recommendation, or consideration without recommendation of medications, and as such was employed during the encounter.    Additional Psychoeducation has been provided, as warranted.    Discussed, to the extent possible, diagnosis, risks and benefits of proposed treatment vs alternative treatments vs no treatment, potential side effects of these treatments and the inherent unpredictability of treatment. The patient's ability to understand, participate and engage in a conversation surrounding this was deemed to be: adequate.     Written material has been provided to supplement, augment, and reinforce any discussions and interventions, via the AVS or other pre-printed handouts, as warranted.      DIAGNOSTIC TESTING:     The chart was reviewed for recent diagnostic procedures and investigations, and pertinent results are noted below.    Wt Readings from Last 2 Encounters:   04/26/23 78.3 kg (172 lb 9.9 oz)   04/06/23 81.7 kg (180 lb 1.9 oz)     BP Readings from Last 1 Encounters:   04/27/23 121/61     Pulse Readings from Last 1 Encounters:   04/27/23 74        Blood Counts, Electrolytes & Glucose: (i.e. WBC, ANC, Hemoglobin, Hematocrit, MCV, Platelets)  Lab Results   Component Value Date    WBC 17.37 (H) 04/26/2023    GRAN 7.2 04/26/2023    GRAN 41.6 04/26/2023    HGB 9.6 (L) 04/26/2023    HCT 31.3 (L) 04/26/2023    MCV 89 04/26/2023    PLT 81 (L) 04/26/2023     04/26/2023    K 3.6 04/26/2023    CALCIUM 7.5 (L) 04/26/2023    PHOS 2.1 (L) 04/26/2023    MG 1.7 04/26/2023    CO2 22 (L) 04/26/2023    ANIONGAP 17 (H) 04/26/2023    GLU 90 04/26/2023    HGBA1C 5.1 03/26/2023       Renal,  Liver, Pancreas, Thyroid, Parathyroid, Prolactin, CPK, Lipids & Vitamin Levels: (i.e. Cr, BUN, Anion Gap, GFR, Urine Specific Gravity, Urine Protein, Microalburnin, AST, ALT, GGT, Alk Phos,Total Bili, Total Protein, Albumin, Ammonia, INR, Amylase, Lipase, TSH, Total T3, Total T4, Free T4 PTH, Prolactin, CPK, Cholesterol, Triglycerides, LDH, HDL, Vitamin B12, Folate, Vitamin D)  Lab Results   Component Value Date    CREATININE 1.2 04/26/2023    BUN 29 (H) 04/26/2023    EGFRNORACEVR 50.5 (A) 04/26/2023    SPECGRAV >1.030 (A) 04/26/2023    PROTEINUA Trace (A) 04/26/2023    AST 48 (H) 04/26/2023    ALT 19 04/26/2023     (H) 04/01/2011    ALKPHOS 48 (L) 04/26/2023    BILITOT 0.7 04/26/2023    LABPROT 11.8 03/26/2023    ALBUMIN 3.7 04/26/2023    AMMONIA 29 04/06/2023    INR 1.1 03/26/2023    AMYLASE 19 (L) 10/14/2014    LIPASE 18 04/25/2023    TSH 0.820 12/24/2022    FREET4 0.90 04/18/2022    PTH 45 10/15/2014    CPK 25 04/01/2023    CHOL 184 04/06/2023    TRIG 161 (H) 04/06/2023    LDLCALC 107.8 04/06/2023    HDL 44 04/06/2023    USNJRUBY20 542 04/06/2023    FOLATE 13.3 04/06/2023    THIAMINEBLOO 123 (H) 04/06/2023    [id number] 24 (L) 10/15/2014       Infection Diseases, Pregnancy Screenings & Drug Levels: (i.e. Hepatitis Panel, HIV, Syphilis, Urine & Blood Pregnancy Screens, beta hCG, Lithium, Valproic Acid, Carbamazepine, Lamotrigine, Phenytoin, Phenobarbital, Clozapine, Norclozapine, Clozapine + Norclozapine)   Lab Results   Component Value Date    HEPAIGM Negative 07/26/2017    HEPBIGM Positive (A) 07/26/2017    HEPCAB Non-reactive 03/10/2023    [id number] Non-reactive 03/10/2023    HCGQUANT <2.0 05/09/2006    LITHIUM <0.1 (L) 09/29/2016       Addiction: (i.e. Urine Toxicology, Blood Alcohol, PETH, EtG, EtS, CDT, Buprenorphine, Norbuprenorphine)  Lab Results   Component Value Date    PCDSOALCOHOL 100 (A) 04/29/2022    PCDSOBENZOD Negative 04/29/2022    BARBITURATES Negative 04/29/2022    PCDSCOMETHA Negative  04/29/2022    OPIATESCREEN Negative 04/29/2022    COCAINEMETAB Negative 04/29/2022    AMPHETAMINES Negative 04/29/2022    MARIJUANATHC Presumptive Positive (A) 04/29/2022    PCDSOPHENCYN Negative 04/29/2022    [id number] 1092 09/27/2022    ALCOHOLMEDIC 86 (H) 04/25/2023       Results for orders placed or performed during the hospital encounter of 04/25/23   EKG 12-lead    Collection Time: 04/25/23 10:17 PM    Narrative    Test Reason : R11.2,    Vent. Rate : 117 BPM     Atrial Rate : 117 BPM     P-R Int : 130 ms          QRS Dur : 096 ms      QT Int : 372 ms       P-R-T Axes : 087 067 056 degrees     QTc Int : 518 ms    Sinus tachycardia  Otherwise normal ECG  When compared with ECG of 01-APR-2023 17:52,  No significant change was found  Confirmed by Areli Olsen MD ([id number]) on 4/26/2023 9:08:54 AM    Referred By: [email or web address]   SELF           Confirmed By:Areli Olsen MD       Results for orders placed or performed during the hospital encounter of 04/01/23   CT Head Without Contrast    Narrative    EXAMINATION:  CT HEAD WITHOUT CONTRAST    CLINICAL HISTORY:  Mental status change, unknown cause;    TECHNIQUE:  Low dose axial CT images obtained throughout the head without the use of intravenous contrast.  Axial, sagittal and coronal reconstructions were performed.    COMPARISON:  CT head without contrast 03/26/2023.    FINDINGS:  Intracranial compartment:    Generalized cerebral volume loss with compensatory dilation of the ventricles and sulci.  No evidence of hydrocephalus.    Supratentorial white matter hypoattenuation, nonspecific and suggestive of chronic microvascular ischemic change.  Stable hypodense focus in the left basal ganglia, likely representing remote lacunar type infarct.  No parenchymal mass, hemorrhage, edema or major vascular distribution infarct.    No extra-axial blood or fluid collections.    Skull/extracranial contents (limited evaluation):    No fracture. Partial opacification of the right mastoid  air cells.  Mild mucosal thickening within the paranasal sinuses.      Impression    1. No evidence of acute intracranial pathology.  Additional evaluation, as clinically warranted  2. Additional incidental findings, as above.    Electronically signed by resident: Angelina Bartlett  Date:    04/01/2023  Time:    21:21    Electronically signed by: Uche Montano MD  Date:    04/01/2023  Time:    21:38   Results for orders placed or performed during the hospital encounter of 06/10/22   MRI Brain Without Contrast    Narrative    EXAMINATION:  MRI BRAIN WITHOUT CONTRAST    CLINICAL HISTORY:  hx of PRES;    TECHNIQUE:  Multiplanar multisequence MR imaging of the brain was performed without intravenous contrast.    COMPARISON:  Head CT 06/10/2022, brain MRI 10/04/2017, 07/21/2017    FINDINGS:  Intracranial Compartment:    Ventricles are normal in size for age without evidence of hydrocephalus.    Ill-defined focus T2-FLAIR signal hyperintensity in the right periatrial white matter.  Few additional scattered punctate foci elsewhere within the supratentorial white matter.  These appear similar to the prior study of 10/04/2017.  No definite new focal lesions.  No diffusion restriction to indicate an acute infarction.  No remote major vascular distribution infarct.  No recent or remote hemorrhage.  No mass effect or midline shift.    No extra-axial blood or fluid collections.    Normal vascular flow voids are preserved.    Skull/Extracranial Contents (limited evaluation):    Bone marrow signal intensity is normal.      Impression    No evidence of acute intracranial pathology.      Electronically signed by: Miguel Malagon MD  Date:    06/10/2022  Time:    09:45

## 2023-04-27 NOTE — PLAN OF CARE
1937: Initial rounding completed at this time. Patient told to alert nurse when she has to have a BM in order to receive the stool sample. Patient denies any needs at this time.    0213: CIWA = 16. Ativan administered per MD order.    0300: New IV inserted to pt L hand. Pt alex well.    NAEON. Pain managed with preemptive PRN medication timing. Patient is without complaints at this time. Plan of care on going.       Problem: Adult Inpatient Plan of Care  Goal: Plan of Care Review  Outcome: Ongoing, Progressing  Goal: Patient-Specific Goal (Individualized)  Outcome: Ongoing, Progressing  Goal: Absence of Hospital-Acquired Illness or Injury  Outcome: Ongoing, Progressing  Goal: Optimal Comfort and Wellbeing  Outcome: Ongoing, Progressing  Goal: Readiness for Transition of Care  Outcome: Ongoing, Progressing     Problem: Diabetes Comorbidity  Goal: Blood Glucose Level Within Targeted Range  Outcome: Ongoing, Progressing     Problem: Infection  Goal: Absence of Infection Signs and Symptoms  Outcome: Ongoing, Progressing     Problem: Fall Injury Risk  Goal: Absence of Fall and Fall-Related Injury  Outcome: Ongoing, Progressing     Problem: Nausea and Vomiting  Goal: Fluid and Electrolyte Balance  Outcome: Ongoing, Progressing     Problem: Alcohol Withdrawal  Goal: Alcohol Withdrawal Symptom Control  Outcome: Ongoing, Progressing     Problem: Acute Neurologic Deterioration (Alcohol Withdrawal)  Goal: Optimal Neurologic Function  Outcome: Ongoing, Progressing     Problem: Substance Misuse (Alcohol Withdrawal)  Goal: Readiness for Change Identified  Outcome: Ongoing, Progressing     Problem: Coping Ineffective  Goal: Effective Coping  Outcome: Ongoing, Progressing     Problem: Skin Injury Risk Increased  Goal: Skin Health and Integrity  Outcome: Ongoing, Progressing

## 2023-04-27 NOTE — ASSESSMENT & PLAN NOTE
Continue CIWA-Ar monitoring  Given thiamine 100mg IV x 1 and start on daily thiamine, folic acid  Continue valium taper  Ativan PRN for breakthrough  withdrawal symptoms  Monitor for seizure like activity

## 2023-04-27 NOTE — SUBJECTIVE & OBJECTIVE
Interval History: Pt admitted with alcohol withdrawal.     Past Medical History:   Diagnosis Date    Anemia     Anemia     Controlled type 2 diabetes mellitus without complication, without long-term current use of insulin 11/30/2021    COPD (chronic obstructive pulmonary disease)     Depression     Diverticulitis     Fatty liver     GERD (gastroesophageal reflux disease)     Hyperlipidemia     Hypertension     Pancreatitis     Peptic ulcer disease     Polysubstance abuse     Posterior reversible encephalopathy syndrome     Sarcoidosis of lung     Sarcoidosis of lung     over 30 yrs ago    Seizures     7/2017    Suicide attempt     Suicide ideation        Past Surgical History:   Procedure Laterality Date    APPENDECTOMY      BILATERAL MASTECTOMY Bilateral 10/29/2020    Procedure: MASTECTOMY, BILATERAL;  Surgeon: Baylee Kevin MD;  Location: [facility] OR [address] [place] [address];  Service: General;  Laterality: Bilateral;    BREAST REVISION SURGERY Bilateral 2/11/2021    Procedure: BREAST REVISION SURGERY;  Surgeon: Scottie Johnson MD;  Location: [facility] OR [address] [place] [address];  Service: Plastics;  Laterality: Bilateral;    COLONOSCOPY N/A 7/28/2017    Procedure: COLONOSCOPY;  Surgeon: Aaron Alvarado MD;  Location: [facility];  Service: Endoscopy;  Laterality: N/A;    ESOPHAGOGASTRODUODENOSCOPY  10/7/2016, 11/6/2014    2016 - gastritis, duodenitis, 2014 erosive gastritis    ESOPHAGOGASTRODUODENOSCOPY N/A 2/11/2020    Procedure: ESOPHAGOGASTRODUODENOSCOPY (EGD);  Surgeon: Fawn Garrido MD;  Location: [facility];  Service: Endoscopy;  Laterality: N/A;    ESOPHAGOGASTRODUODENOSCOPY N/A 4/19/2021    Procedure: EGD (ESOPHAGOGASTRODUODENOSCOPY);  Surgeon: Paramjit Martino MD;  Location: [facility];  Service: Endoscopy;  Laterality: N/A;    FLEXIBLE SIGMOIDOSCOPY  11/06/2014    colitis    HYSTERECTOMY      IMPLANTATION OF PERMANENT SACRAL NERVE STIMULATOR N/A 7/12/2022    Procedure: INSERTION, NEUROSTIMULATOR, PERMANENT, SACRAL;  Surgeon: Juaquin MAGALLANES  MD Jerry;  Location: [address];  Service: Urology;  Laterality: N/A;  1hr    INJECTION FOR SENTINEL NODE IDENTIFICATION Right 10/29/2020    Procedure: INJECTION, FOR SENTINEL NODE IDENTIFICATION;  Surgeon: Baylee Kevin MD;  Location: [address];  Service: General;  Laterality: Right;    INJECTION OF JOINT Right 10/10/2019    Procedure: Injection, Joint RIGHT ILIOPSOAS BURSA/TENDON INJECTION AND RIGHT GLUTEAL TENDON INJECTION WITH STEROID AND LIDOCAINE;  Surgeon: Guillaume Rico MD;  Location: [facility];  Service: Pain Management;  Laterality: Right;  NEEDS CONSENT    INSERTION OF BREAST TISSUE EXPANDER Bilateral 10/29/2020    Procedure: INSERTION, TISSUE EXPANDER, BREAST;  Surgeon: Scottie Johnson MD;  Location: [address];  Service: Plastics;  Laterality: Bilateral;  Right breast: 1082 g  Left breast: 1076 g    LIPOSUCTION Bilateral 2/11/2021    Procedure: LIPOSUCTION;  Surgeon: Scottie Johnson MD;  Location: [address];  Service: Plastics;  Laterality: Bilateral;    mediastenoscopy      REPLACEMENT OF IMPLANT OF BREAST Bilateral 2/11/2021    Procedure: REPLACEMENT, IMPLANT, BREAST;  Surgeon: Scottie Johnson MD;  Location: [address];  Service: Plastics;  Laterality: Bilateral;    SENTINEL LYMPH NODE BIOPSY Right 10/29/2020    Procedure: BIOPSY, LYMPH NODE, SENTINEL;  Surgeon: Baylee Kevin MD;  Location: [address];  Service: General;  Laterality: Right;    TONSILLECTOMY N/A 1970    TUBAL LIGATION         Review of patient's allergies indicates:   Allergen Reactions    Lortab [hydrocodone-acetaminophen] Itching    Promethazine Itching and Other (See Comments)       Medications:  Continuous Infusions:  Scheduled Meds:   ARIPiprazole  5 mg Oral Daily    diazePAM  10 mg Oral Q8H    DULoxetine  60 mg Oral Daily    folic acid  1 mg Oral Daily    levothyroxine  50 mcg Oral Before breakfast    lisinopriL  20 mg Oral Daily    thiamine  100 mg Oral Daily     traZODone  200 mg Oral QHS     PRN Meds:acetaminophen, dextrose 10%, dextrose 10%, dextrose, dextrose, glucagon (human recombinant), lorazepam, melatonin, naloxone, ondansetron, prochlorperazine, sodium chloride 0.9%, sodium chloride 0.9%    Family History       Problem Relation (Age of Onset)    Breast cancer Maternal Aunt, Daughter    Colon cancer Maternal Uncle    Diabetes Father, Mother    Heart attack Father    Hypertension Father, Mother          Tobacco Use    Smoking status: Former     Packs/day: 0.50     Years: 30.00     Pack years: 15.00     Types: Vaping with nicotine, Cigarettes     Quit date: 2021     Years since quittin.2    Smokeless tobacco: Never   Substance and Sexual Activity    Alcohol use: Yes     Comment: vodka daily (half a regular bottle)    Drug use: Yes     Types: Marijuana     Comment: gummies    Sexual activity: Yes     Birth control/protection: Surgical       Review of Systems   Constitutional:  Positive for fatigue.   HENT:  Negative for trouble swallowing.    Respiratory:  Negative for shortness of breath.    Gastrointestinal:  Positive for nausea.   Psychiatric/Behavioral:  Positive for decreased concentration.    Objective:     Vital Signs (Most Recent):  Temp: 97.2 °F (36.2 °C) (23 0845)  Pulse: 81 (23 0845)  Resp: (!) 21 (23 0845)  BP: (!) 155/80 (23 0845)  SpO2: 96 % (23 0845)   Vital Signs (24h Range):  Temp:  [97.2 °F (36.2 °C)-98.3 °F (36.8 °C)] 97.2 °F (36.2 °C)  Pulse:  [] 81  Resp:  [16-21] 21  SpO2:  [91 %-99 %] 96 %  BP: (121-162)/(58-80) 155/80     Weight: 78.3 kg (172 lb 9.9 oz)  Body mass index is 27.04 kg/m².    Physical Exam  Constitutional:       General: She is not in acute distress.  HENT:      Head: Normocephalic and atraumatic.   Pulmonary:      Effort: Pulmonary effort is normal. No respiratory distress.   Musculoskeletal:      Cervical back: Full passive range of motion without pain and normal range of motion.    Skin:     General: Skin is warm and dry.   Neurological:      Mental Status: She is alert and oriented to person, place, and time.      Comments: Pt is alert when speaking to her but reports she feels very sleepy.       Review of Symptoms      Symptom Assessment (ESAS 0-10 Scale)  Pain:  0  Dyspnea:  0  Anxiety:  0  Nausea:  4  Depression:  0  Anorexia:  0  Fatigue:  0  Insomnia:  0  Restlessness:  0  Agitation:  0         Psychosocial/Cultural:   See Palliative Psychosocial Note: No  Pt is . Unclear if spouse living with her or left her a few weeks ago. Pt reports she has a son.  Pt reports to call her  if she is unable to make medical decisions.   **Primary  to Follow**  Palliative Care  Consult: Yes    Spiritual:  A - Address in Care:  Pt reports she does NOT want a  visit.       Advance Care Planning   Advance Directives:   Living Will: No    Do Not Resuscitate Status: No    Medical Power of : No    Agent's Name:  Spouse is NOK    Decision Making:  Patient answered questions  Goals of Care: The patient endorses that what is most important right now is to focus on curative/life-prolongation (regardless of treatment burdens)    Accordingly, we have decided that the best plan to meet the patient's goals includes continuing with treatment       Significant Labs: All pertinent labs within the past 24 hours have been reviewed.  CBC:   Recent Labs   Lab 04/26/23  0340   WBC 17.37*   HGB 9.6*   HCT 31.3*   MCV 89   PLT 81*     BMP:  No results for input(s): GLU, NA, K, CL, CO2, BUN, CREATININE, CALCIUM, MG in the last 24 hours.  LFT:  Lab Results   Component Value Date    AST 48 (H) 04/26/2023     (H) 04/01/2011    ALKPHOS 48 (L) 04/26/2023    BILITOT 0.7 04/26/2023     Albumin:   Albumin   Date Value Ref Range Status   04/26/2023 3.7 3.5 - 5.2 g/dL Final     Protein:   Total Protein   Date Value Ref Range Status   04/26/2023 6.1 6.0 - 8.4 g/dL Final      Lactic acid:   Lab Results   Component Value Date    LACTATE 2.2 04/25/2023    LACTATE 2.0 03/26/2023       Significant Imaging: I have reviewed all pertinent imaging results/findings within the past 24 hours.

## 2023-04-27 NOTE — PROGRESS NOTES
[facility] Medicine  Progress Note    Patient Name: Earl Abdul  MRN: [id number]  Patient Class: IP- Inpatient   Admission Date: 4/25/2023  Length of Stay: 1 days  Attending Physician: Susana Moreno MD  Primary Care Provider: Andrew Rodriguez MD        Subjective:     Principal Problem:Alcohol withdrawal        HPI:  Earl Abdul is a 62 year old female with a past medical history of tobacco use, alcohol abuse, depression with suicidal attempt (2017), history of breast cancer, CLL (dx on 6/2022, not on treatment), fatty liver disease.  She presented to the ER with nausea and vomiting for several days per ER.  She was discharged from her previous hospitalization about 3 weeks ago and states that she stopped drinking for about a week, and then began drinking again about 2 weeks prior to admission.  Her last drink was at 10 AM the day of admission.  She also continues to smoke cigarettes.  She reports abdominal pain, nausea, vomiting, diarrhea, and shortness of breath.  Denies any fevers.  Further history and ROS is limited due to patient uncooperative with exam and sleeping.       Upon chart review she has a history of significant alcohol withdrawal symptoms including seizures that the patient reports.  In the ER she was noted to be actively vomiting.  She appeared ill and short of breath, placed on nasal cannula.  She was tachycardic to the 120s, sinus tach.  Labs remarkable for a large anion gap of 28.  Patient with an DEVANTE, and hypoglycemic to the 50s on chemistry.  Alcohol level of 86, BOHB level of 7.  WBC elevated to 29K with a lymphocytic predominance.      She is admitted to hospital medicine for monitoring for alcohol withdrawals.        Overview/Hospital Course:  Admitted for alcohol withdrawal currently on valium taper, CIWA protocol in place.   Diarrhea noted, Cdiff ordered.       Interval History: Patient reports feeling ok today, sleepy. Endorses nonbloody diarrhea in diaper.  N.V controlled with PRNs tolerating PO.   CIWA 16 this morning, PRN ativan given.   Tolerating valium taper, psych following. Updated  at bedside.     Review of Systems   Constitutional:  Negative for chills and fever.   HENT:  Negative for trouble swallowing.    Eyes:  Negative for visual disturbance.   Respiratory:  Negative for shortness of breath.    Cardiovascular:  Negative for chest pain.   Gastrointestinal:  Positive for diarrhea. Negative for abdominal pain.   Genitourinary:  Negative for difficulty urinating.   Musculoskeletal:  Negative for neck stiffness.   Skin:  Negative for rash.   Neurological:  Negative for light-headedness.   Psychiatric/Behavioral:  Negative for confusion.    Objective:     Vital Signs (Most Recent):  Temp: 98 °F (36.7 °C) (04/27/23 1152)  Pulse: 95 (04/27/23 1506)  Resp: 17 (04/27/23 1200)  BP: (!) 154/79 (04/27/23 1200)  SpO2: 97 % (04/27/23 1200)   Vital Signs (24h Range):  Temp:  [97.2 °F (36.2 °C)-98.3 °F (36.8 °C)] 98 °F (36.7 °C)  Pulse:  [74-95] 95  Resp:  [17-21] 17  SpO2:  [91 %-97 %] 97 %  BP: (121-162)/(61-80) 154/79     Weight: 78.3 kg (172 lb 9.9 oz)  Body mass index is 27.04 kg/m².    Intake/Output Summary (Last 24 hours) at 4/27/2023 1818  Last data filed at 4/27/2023 1300  Gross per 24 hour   Intake 475 ml   Output --   Net 475 ml      Physical Exam  Vitals and nursing note reviewed.   Constitutional:       General: She is not in acute distress.     Appearance: Normal appearance.   HENT:      Head: Normocephalic and atraumatic.      Right Ear: External ear normal.      Left Ear: External ear normal.      Nose: Nose normal.      Mouth/Throat:      Mouth: Mucous membranes are moist.      Pharynx: Oropharynx is clear.   Eyes:      Extraocular Movements: Extraocular movements intact.      Conjunctiva/sclera: Conjunctivae normal.      Pupils: Pupils are equal, round, and reactive to light.   Cardiovascular:      Rate and Rhythm: Normal rate and regular rhythm.       Pulses: Normal pulses.      Heart sounds: Normal heart sounds.   Pulmonary:      Effort: Pulmonary effort is normal.      Breath sounds: Normal breath sounds.   Abdominal:      General: Abdomen is flat. Bowel sounds are normal.      Palpations: Abdomen is soft.   Musculoskeletal:         General: Normal range of motion.      Cervical back: Normal range of motion and neck supple.   Skin:     General: Skin is warm and dry.   Neurological:      General: No focal deficit present.      Mental Status: She is alert and oriented to person, place, and time.   Psychiatric:         Mood and Affect: Mood normal.         Behavior: Behavior normal.       Significant Labs: All pertinent labs within the past 24 hours have been reviewed.    Significant Imaging: I have reviewed all pertinent imaging results/findings within the past 24 hours.      Assessment/Plan:      * Alcohol withdrawal  Continue CIWA-Ar monitoring  Given thiamine 100mg IV x 1 and start on daily thiamine, folic acid  Continue valium taper  Ativan PRN for breakthrough  withdrawal symptoms  Monitor for seizure like activity      Goals of care, counseling/discussion  Palliative following      Advance care planning  Palliative care following      Palliative care encounter  - consulted, appreciate recs      Depression  - psych following  - cont home meds        CLL (chronic lymphocytic leukemia)  Patient diagnosed via FISH on 6/2022  Will followup with [facility] outpt    Alcoholic ketoacidosis  Patient with significantly elevated anion gap, BOHB elevated  Alcoholic ketosis, hypoglycemia.  Monitor for refeeding syndrome, daily phos- replete PRN  Given thiamine 100mg IV x 1, cont daily therapy    Nausea  - PRN zofran      History of substance abuse  Patient with multiple readmissions related to alcohol use  Would benefit from psychiatric evaluation      Diarrhea  Patient reports watery diarrhea for the past week  Elevated WBC but lymphocytic predominance, in the setting of  CLL differential includes Cdiff as well as CLL  Ordered C diff EIA, results pending  Will monitor symptoms of diarrhea, will treat symptomatically if negative for C Diff      VTE Risk Mitigation (From admission, onward)         Ordered     IP VTE HIGH RISK PATIENT  Once         04/26/23 0322     Place sequential compression device  Until discontinued         04/26/23 0322     Place sequential compression device  Until discontinued         04/26/23 0319                Discharge Planning   BECCA: 4/29/2023     Code Status: Full Code   Is the patient medically ready for discharge?: No    Reason for patient still in hospital (select all that apply): Patient trending condition and Treatment  Discharge Plan A: Home, Home with family                  Susana Moreno MD  Department of Hospital Medicine   [facility]

## 2023-04-27 NOTE — ASSESSMENT & PLAN NOTE
Impression: Pt is a 61 y/o female admitted wit alcohol withdrawal. 62 year old female with a past medical history of tobacco use, alcohol abuse, depression with suicidal attempt (2017), history of breast cancer, CLL (dx on 6/2022, not on treatment), fatty liver disease.  She presented to the ER with nausea and vomiting for several days per ER.  Pt is alert and oriented to person, place, and situation. Pt reports she is sleepy. Pt is a full code.     Reason for consult: ACP Communicated with Dr. Crenshaw.     Goals of care/ACP:     Explained role of pal care.     Met with pt who is here for nausea/alcohol withdrawal. Per pt, she saw psych yesterday. She is open to rehab. Per pt, rehab helps for short periods of time but she is open to trying again. Pt's gaol is to continue to receive medical treatment. She is open to f/u with Hem/Onc for CLL.     Pt reports she lives at home with her her  but unclear from chart if he left a week ago.   MPOA education discussed. Per pt, she wants her spouse to be her medical decision-maker.     Symptom management:     Pt reports fatigue.     Nausea:  Pt reports nausea throughout day.   Current meds:  Pt on Zofran (first choice)  Phenergan (second choice)    Pt reports medication helps with nausea and improving.     Alcohol withdrawal  Continue CIWA-Ar monitoring  Given thiamine 100mg IV x 1 and start on daily thiamine, folic acid  Start on librium 50mg q8hr x 4 doses, then 25mg q6hr PRN  Ativan 1mg IV PRN for breakthru withdrawal symptoms  Monitor for seizure like activity    Plan:   Will have palliative care SW se pt for psychosocial evaluation.

## 2023-04-27 NOTE — PLAN OF CARE
Problem: Adult Inpatient Plan of Care  Goal: Plan of Care Review  Outcome: Ongoing, Progressing     Problem: Adult Inpatient Plan of Care  Goal: Absence of Hospital-Acquired Illness or Injury  Outcome: Ongoing, Progressing     Problem: Diabetes Comorbidity  Goal: Blood Glucose Level Within Targeted Range  Outcome: Ongoing, Progressing     Problem: Alcohol Withdrawal  Goal: Alcohol Withdrawal Symptom Control  Outcome: Ongoing, Progressing     Problem: Nausea and Vomiting  Goal: Fluid and Electrolyte Balance  Outcome: Ongoing, Progressing     Problem: Violence Risk or Actual  Goal: Anger and Impulse Control  Outcome: Ongoing, Progressing     Problem: Skin Injury Risk Increased  Goal: Skin Health and Integrity  Outcome: Ongoing, Progressing   Pt is A,A,O x 4 . Stable V/S. Pt C/O sore throat and received chloraseptic spray. Also pt C/O nausea and received antiemetic medications as ordered. Pt slept between care. Pt was up to the bathroom independently , No falls or  injuries per day . CIWA score was chelsie >8 all day and pt received ativan as ordered. Pt was calm and cooperative during the day. No Bm today per the pt .

## 2023-04-27 NOTE — HOSPITAL COURSE
Admitted for alcohol withdrawal currently on valium taper, CIWA protocol in place.CIWA improving, PRN ativan CIWA >8  Diarrhea noted, Cdiff ordered but diarrhea resolved so canceled.  Replete electrolytes as needed.   Patient stable CIWA <5, day 5 post alcohol intake. No seizure noted. Tolerating valium taper.   Patient wanting to go home. Stable for discharge to continue valium taper. Followup with psych and addiction medicine referral sent.   Recommend f/u with PCP with labs. Cont thiamine, folic acid and MV.   F/U with [facility] regarding CLL outpt, apt scheduled.

## 2023-04-27 NOTE — PROGRESS NOTES
"      FOLLOW UP VISIT: ADDICTION PSYCHIATRY CONSULTATION SERVICE      ASSESSMENT AND PLAN:     DIAGNOSES & PROBLEMS:  Alcohol use disorder, severe  Tobacco use disorder, unspecified severity  Depression, unspecified    In Summary:  Pt is a 62 y.o F w/ PMH of tobacco use, alcohol abuse, and depression presenting due to c/o nausea, vomiting, diarrhea. Pt states she's been drinking vodka daily for many years off and on. She reports withdrawal symptoms with c/o n/v, weakness, shaking. She does have a hx of complicated withdrawals and seizures. Had a period of sobriety for 6 months prior to relapsing roughly 3 weeks ago because of marital conflicts. She has been "in and out of" rehab, detox, and AA meetings and is interested in attempting to quit again, open to outpatient rehab. Also states she's been smoking for many years off, usually cigarettes <1/2 ppd or vaping. She does not feel ready to quit tobacco at this time. Pt used to see a psychiatrist for her depression but stopped because "it's expensive" but is willing to go back. Would recommend restarting home meds and continuing benzo taper with alcohol withdrawals.       Plan:  - continue benzo taper per primary - on valium 10 mg Q8H  - continue PRN ativan per CIWA protocol   - continue home cymbalta 60, abilify 5, trazodone 200  - pt counseled on substance cessation, resources provided     - continue alcohol withdrawal protocol while patient is in house, including supportive measures,frequent monitoring of vitals and CIWA-Ar, and initiation of PRN +/- standing benzodiazepines to minimize risk of complicated withdrawal  - patient counseled on abstinence from alcohol and substances of abuse (illicit and prescription)  - counseled on full engagement in 12 step (or equivalent) recovery program(s), including acquisition of a sponsor  - addiction resource sheet provided to patient  - relapse prevention and motivational interviewing provided         INTERVAL HISTORY AND " "ROS:     Pt was asleep and resting comfortably. Pt was difficult to wake and would often fall back asleep. She reports feeling better than yesterday but still having a bit of nausea and diarrhea. She denied SI/HI/AVH and denied having any other alcohol withdrawal symptoms. Oriented to person place and time. Since pt was somnolent today, will continue discussion of rehab tomorrow.     CURRENT PSYCHOTROPIC REGIMEN:  Cymbalta 60 daily   Trazodone 200 qhs   Abilify 5 daily   Valium 10 mg q8H  PRN ativan 2 mg    PERTINENT PAST HISTORY AND CHART REVIEW:     Please see consult notes and H+P.     Resumed her home meds.   Received PRN ativan 2 @0213 and 0848 this am.      EXAMINATION:     BP (!) 155/80   Pulse 81   Temp 98 °F (36.7 °C) (Oral)   Resp (!) 21   Ht 5' 7" (1.702 m)   Wt 78.3 kg (172 lb 9.9 oz)   SpO2 96%   Breastfeeding No   BMI 27.04 kg/m²     MENTAL STATUS EXAMINATION:  General Appearance & Behavior: **  adequately groomed, appropriately dressed, in no apparent distress, under good behavioral control  Involuntary Movements and Motor Activity: **  no abnormal involuntary movements noted, no psychomotor agitation or retardation  Speech & Language: **   mumbling and half-asleep   Mood: unable to assess, pt mumbled in response   Affect: **  appropriate to situation and context  Thought Process & Associations: **  linear superficially  Thought Content & Perceptions: **  no suicidal or homicidal ideation, no evidence of psychosis  Sensorium and Cognition: **  drowsy, was half-asleep  Insight & Judgment: **  demonstrates awareness of illness and situation      RISK MANAGEMENT:     The following risk parameters were assessed during this evaluation:    I[]I Y  I[x]I N  I[]I U  I[]I A  Suicidal Ideation/Behavior: **   I[]I Y  I[x]I N  I[]I U  I[]I A  Homicidal Ideation/Behavior: **  I[]I Y  I[x]I N  I[]I U  I[]I A  Violence: **  I[]I Y  I[x]I N  I[]I U  I[]I A  Self-Injurious Behavior: **    The patient is deemed " to be a reliable and factually accurate historian.    I[]I Y  I[x]I N  I[]I U  I[]I A  I[]I N/A  Minimization of Risk Parameters Suspected/Evident: **  I[]I Y  I[x]I N  I[]I U  I[]I A  I[]I N/A  Exaggeration of Risk Parameters Suspected/Evident: **    [] Y  [x] N  Danger to Self:   [] Y  [x] N  Danger to Others:   [] Y  [x] N  Grave Disability:     The patient does not currently meet the criteria for psychiatric admission and can be safely and effectively managed in a less restrictive level of care.    In cases of emergency, daily coverage provided by Acute/ER Psych MD, NP, PA, or SW, with contact numbers located in [facility] On Call Schedule.    Jami Meraz MS3  Francesco Boyle MD  Department of Psychiatry  [facility]        KEY:     I[]I Y = Yes / Present / Endorses  I[]I N = No / Absent / Denies  I[]I U = Unknown / Unable to Assess / Unwilling to Participate  I[]I A = Ambiguity Exists / Accuracy Uncertain  I[]I D = Denial or Minimization is Suspected/Evident  I[]I N/A = Non-Applicable    CHART REVIEW:     Available documentation has been reviewed, and pertinent elements of the chart - including previous psychiatric evaluations - have been incorporated into this evaluation where appropriate.    ADVICE AND COUNSELING:     [x] In cases of emergencies (e.g. SI/HI resulting in danger to self or others, functioning deteriorates to the level of grave disability), call 911 or [phone], or present to the emergency department for immediate assistance.  [x] Patient should not operate a motor vehicle or heavy machinery if effects of medications or underlying symptoms/condition impair the ability to safely do so.    Alcohol, Tobacco, and Drug Counseling, as well as resources, has been provided, as warranted.     Shared medical decision making and informed consent are the hallmark and bedrock of good clinical care, and as such have been employed and obtained, respectively, to the degree possible.      Risk  Mitigation Strategies, Harm Reduction Techniques, and Safety Netting are important interventions that can reduce acute and chronic risk, and as such have been employed to the degree possible.    Prescription Drug Management entails the review, recommendation, or consideration without recommendation of medications, and as such was employed during the encounter.    Additional Psychoeducation has been provided, as warranted.    Discussed, to the extent possible, diagnosis, risks and benefits of proposed treatment vs alternative treatments vs no treatment, potential side effects of these treatments and the inherent unpredictability of treatment. The patient's ability to understand, participate and engage in a conversation surrounding this was deemed to be: adequate.     Written material has been provided to supplement, augment, and reinforce any discussions and interventions, via the AVS or other pre-printed handouts, as warranted.      DIAGNOSTIC TESTING:     The chart was reviewed for recent diagnostic procedures and investigations, and pertinent results are noted below.    Wt Readings from Last 2 Encounters:   04/26/23 78.3 kg (172 lb 9.9 oz)   04/06/23 81.7 kg (180 lb 1.9 oz)     BP Readings from Last 1 Encounters:   04/27/23 (!) 155/80     Pulse Readings from Last 1 Encounters:   04/27/23 81        Blood Counts, Electrolytes & Glucose: (i.e. WBC, ANC, Hemoglobin, Hematocrit, MCV, Platelets)  Lab Results   Component Value Date    WBC 17.37 (H) 04/26/2023    GRAN 7.2 04/26/2023    GRAN 41.6 04/26/2023    HGB 9.6 (L) 04/26/2023    HCT 31.3 (L) 04/26/2023    MCV 89 04/26/2023    PLT 81 (L) 04/26/2023     04/26/2023    K 3.6 04/26/2023    CALCIUM 7.5 (L) 04/26/2023    PHOS 2.1 (L) 04/26/2023    MG 1.7 04/26/2023    CO2 22 (L) 04/26/2023    ANIONGAP 17 (H) 04/26/2023    GLU 90 04/26/2023    HGBA1C 5.1 03/26/2023       Renal, Liver, Pancreas, Thyroid, Parathyroid, Prolactin, CPK, Lipids & Vitamin Levels: (i.e. Cr,  BUN, Anion Gap, GFR, Urine Specific Gravity, Urine Protein, Microalburnin, AST, ALT, GGT, Alk Phos,Total Bili, Total Protein, Albumin, Ammonia, INR, Amylase, Lipase, TSH, Total T3, Total T4, Free T4 PTH, Prolactin, CPK, Cholesterol, Triglycerides, LDH, HDL, Vitamin B12, Folate, Vitamin D)  Lab Results   Component Value Date    CREATININE 1.2 04/26/2023    BUN 29 (H) 04/26/2023    EGFRNORACEVR 50.5 (A) 04/26/2023    SPECGRAV >1.030 (A) 04/26/2023    PROTEINUA Trace (A) 04/26/2023    AST 48 (H) 04/26/2023    ALT 19 04/26/2023     (H) 04/01/2011    ALKPHOS 48 (L) 04/26/2023    BILITOT 0.7 04/26/2023    LABPROT 11.8 03/26/2023    ALBUMIN 3.7 04/26/2023    AMMONIA 29 04/06/2023    INR 1.1 03/26/2023    AMYLASE 19 (L) 10/14/2014    LIPASE 18 04/25/2023    TSH 0.820 12/24/2022    FREET4 0.90 04/18/2022    PTH 45 10/15/2014    CPK 25 04/01/2023    CHOL 184 04/06/2023    TRIG 161 (H) 04/06/2023    LDLCALC 107.8 04/06/2023    HDL 44 04/06/2023    CFEAXBLX44 542 04/06/2023    FOLATE 13.3 04/06/2023    THIAMINEBLOO 123 (H) 04/06/2023    [id number] 24 (L) 10/15/2014       Infection Diseases, Pregnancy Screenings & Drug Levels: (i.e. Hepatitis Panel, HIV, Syphilis, Urine & Blood Pregnancy Screens, beta hCG, Lithium, Valproic Acid, Carbamazepine, Lamotrigine, Phenytoin, Phenobarbital, Clozapine, Norclozapine, Clozapine + Norclozapine)   Lab Results   Component Value Date    HEPAIGM Negative 07/26/2017    HEPBIGM Positive (A) 07/26/2017    HEPCAB Non-reactive 03/10/2023    [id number] Non-reactive 03/10/2023    HCGQUANT <2.0 05/09/2006    LITHIUM <0.1 (L) 09/29/2016       Addiction: (i.e. Urine Toxicology, Blood Alcohol, PETH, EtG, EtS, CDT, Buprenorphine, Norbuprenorphine)  Lab Results   Component Value Date    PCDSOALCOHOL 100 (A) 04/29/2022    PCDSOBENZOD Negative 04/29/2022    BARBITURATES Negative 04/29/2022    PCDSCOMETHA Negative 04/29/2022    OPIATESCREEN Negative 04/29/2022    COCAINEMETAB Negative 04/29/2022     AMPHETAMINES Negative 04/29/2022    MARIJUANATHC Presumptive Positive (A) 04/29/2022    PCDSOPHENCYN Negative 04/29/2022    [id number] 09/27/2022    ALCOHOLMEDIC 86 (H) 04/25/2023       Results for orders placed or performed during the hospital encounter of 04/25/23   EKG 12-lead    Collection Time: 04/25/23 10:17 PM    Narrative    Test Reason : R11.2,    Vent. Rate : 117 BPM     Atrial Rate : 117 BPM     P-R Int : 130 ms          QRS Dur : 096 ms      QT Int : 372 ms       P-R-T Axes : 087 067 056 degrees     QTc Int : 518 ms    Sinus tachycardia  Otherwise normal ECG  When compared with ECG of 01-APR-2023 17:52,  No significant change was found  Confirmed by Areli Olsen MD ([id number]) on 4/26/2023 9:08:54 AM    Referred By: [email or web address]   SELF           Confirmed By:Areli Olsen MD       Results for orders placed or performed during the hospital encounter of 04/01/23   CT Head Without Contrast    Narrative    EXAMINATION:  CT HEAD WITHOUT CONTRAST    CLINICAL HISTORY:  Mental status change, unknown cause;    TECHNIQUE:  Low dose axial CT images obtained throughout the head without the use of intravenous contrast.  Axial, sagittal and coronal reconstructions were performed.    COMPARISON:  CT head without contrast 03/26/2023.    FINDINGS:  Intracranial compartment:    Generalized cerebral volume loss with compensatory dilation of the ventricles and sulci.  No evidence of hydrocephalus.    Supratentorial white matter hypoattenuation, nonspecific and suggestive of chronic microvascular ischemic change.  Stable hypodense focus in the left basal ganglia, likely representing remote lacunar type infarct.  No parenchymal mass, hemorrhage, edema or major vascular distribution infarct.    No extra-axial blood or fluid collections.    Skull/extracranial contents (limited evaluation):    No fracture. Partial opacification of the right mastoid air cells.  Mild mucosal thickening within the paranasal sinuses.      Impression     1. No evidence of acute intracranial pathology.  Additional evaluation, as clinically warranted  2. Additional incidental findings, as above.    Electronically signed by resident: Angelina Bartlett  Date:    04/01/2023  Time:    21:21    Electronically signed by: Uche Montano MD  Date:    04/01/2023  Time:    21:38   Results for orders placed or performed during the hospital encounter of 06/10/22   MRI Brain Without Contrast    Narrative    EXAMINATION:  MRI BRAIN WITHOUT CONTRAST    CLINICAL HISTORY:  hx of PRES;    TECHNIQUE:  Multiplanar multisequence MR imaging of the brain was performed without intravenous contrast.    COMPARISON:  Head CT 06/10/2022, brain MRI 10/04/2017, 07/21/2017    FINDINGS:  Intracranial Compartment:    Ventricles are normal in size for age without evidence of hydrocephalus.    Ill-defined focus T2-FLAIR signal hyperintensity in the right periatrial white matter.  Few additional scattered punctate foci elsewhere within the supratentorial white matter.  These appear similar to the prior study of 10/04/2017.  No definite new focal lesions.  No diffusion restriction to indicate an acute infarction.  No remote major vascular distribution infarct.  No recent or remote hemorrhage.  No mass effect or midline shift.    No extra-axial blood or fluid collections.    Normal vascular flow voids are preserved.    Skull/Extracranial Contents (limited evaluation):    Bone marrow signal intensity is normal.      Impression    No evidence of acute intracranial pathology.      Electronically signed by: Miguel Malagon MD  Date:    06/10/2022  Time:    09:45

## 2023-04-28 ENCOUNTER — PATIENT MESSAGE (OUTPATIENT)
Dept: HEMATOLOGY/ONCOLOGY | Facility: CLINIC | Age: 63
End: 2023-04-28
Payer: COMMERCIAL

## 2023-04-28 LAB
ALBUMIN SERPL BCP-MCNC: 3.5 G/DL (ref 3.5–5.2)
ALP SERPL-CCNC: 45 U/L (ref 55–135)
ALT SERPL W/O P-5'-P-CCNC: 21 U/L (ref 10–44)
ANION GAP SERPL CALC-SCNC: 9 MMOL/L (ref 8–16)
AST SERPL-CCNC: 35 U/L (ref 10–40)
BASOPHILS # BLD AUTO: 0.01 K/UL (ref 0–0.2)
BASOPHILS NFR BLD: 0.3 % (ref 0–1.9)
BILIRUB SERPL-MCNC: 0.5 MG/DL (ref 0.1–1)
BUN SERPL-MCNC: 5 MG/DL (ref 8–23)
CALCIUM SERPL-MCNC: 8.7 MG/DL (ref 8.7–10.5)
CHLORIDE SERPL-SCNC: 97 MMOL/L (ref 95–110)
CO2 SERPL-SCNC: 32 MMOL/L (ref 23–29)
CREAT SERPL-MCNC: 0.6 MG/DL (ref 0.5–1.4)
DIFFERENTIAL METHOD: ABNORMAL
EOSINOPHIL # BLD AUTO: 0.1 K/UL (ref 0–0.5)
EOSINOPHIL NFR BLD: 1.5 % (ref 0–8)
ERYTHROCYTE [DISTWIDTH] IN BLOOD BY AUTOMATED COUNT: 16.5 % (ref 11.5–14.5)
EST. GFR  (NO RACE VARIABLE): >60 ML/MIN/1.73 M^2
GLUCOSE SERPL-MCNC: 129 MG/DL (ref 70–110)
HCT VFR BLD AUTO: 32.1 % (ref 37–48.5)
HGB BLD-MCNC: 9.7 G/DL (ref 12–16)
IMM GRANULOCYTES # BLD AUTO: 0.01 K/UL (ref 0–0.04)
IMM GRANULOCYTES NFR BLD AUTO: 0.3 % (ref 0–0.5)
LYMPHOCYTES # BLD AUTO: 2.5 K/UL (ref 1–4.8)
LYMPHOCYTES NFR BLD: 62.4 % (ref 18–48)
MAGNESIUM SERPL-MCNC: 1.7 MG/DL (ref 1.6–2.6)
MCH RBC QN AUTO: 27.3 PG (ref 27–31)
MCHC RBC AUTO-ENTMCNC: 30.2 G/DL (ref 32–36)
MCV RBC AUTO: 90 FL (ref 82–98)
METHANOL SERPL-MCNC: <5 MG/DL
MONOCYTES # BLD AUTO: 0.3 K/UL (ref 0.3–1)
MONOCYTES NFR BLD: 6.8 % (ref 4–15)
NEUTROPHILS # BLD AUTO: 1.1 K/UL (ref 1.8–7.7)
NEUTROPHILS NFR BLD: 28.7 % (ref 38–73)
NRBC BLD-RTO: 0 /100 WBC
PHOSPHATE SERPL-MCNC: 2.5 MG/DL (ref 2.7–4.5)
PLATELET # BLD AUTO: 63 K/UL (ref 150–450)
PMV BLD AUTO: 11 FL (ref 9.2–12.9)
POCT GLUCOSE: 113 MG/DL (ref 70–110)
POTASSIUM SERPL-SCNC: 3.2 MMOL/L (ref 3.5–5.1)
PROT SERPL-MCNC: 5.8 G/DL (ref 6–8.4)
RBC # BLD AUTO: 3.55 M/UL (ref 4–5.4)
SODIUM SERPL-SCNC: 138 MMOL/L (ref 136–145)
WBC # BLD AUTO: 3.96 K/UL (ref 3.9–12.7)

## 2023-04-28 PROCEDURE — 63600175 PHARM REV CODE 636 W HCPCS: Performed by: STUDENT IN AN ORGANIZED HEALTH CARE EDUCATION/TRAINING PROGRAM

## 2023-04-28 PROCEDURE — 25000003 PHARM REV CODE 250: Performed by: STUDENT IN AN ORGANIZED HEALTH CARE EDUCATION/TRAINING PROGRAM

## 2023-04-28 PROCEDURE — 99232 SBSQ HOSP IP/OBS MODERATE 35: CPT | Mod: ,,, | Performed by: STUDENT IN AN ORGANIZED HEALTH CARE EDUCATION/TRAINING PROGRAM

## 2023-04-28 PROCEDURE — 36415 COLL VENOUS BLD VENIPUNCTURE: CPT | Performed by: STUDENT IN AN ORGANIZED HEALTH CARE EDUCATION/TRAINING PROGRAM

## 2023-04-28 PROCEDURE — 80053 COMPREHEN METABOLIC PANEL: CPT | Performed by: STUDENT IN AN ORGANIZED HEALTH CARE EDUCATION/TRAINING PROGRAM

## 2023-04-28 PROCEDURE — 85025 COMPLETE CBC W/AUTO DIFF WBC: CPT | Performed by: STUDENT IN AN ORGANIZED HEALTH CARE EDUCATION/TRAINING PROGRAM

## 2023-04-28 PROCEDURE — 99232 PR SUBSEQUENT HOSPITAL CARE,LEVL II: ICD-10-PCS | Mod: ,,, | Performed by: STUDENT IN AN ORGANIZED HEALTH CARE EDUCATION/TRAINING PROGRAM

## 2023-04-28 PROCEDURE — 99232 PR SUBSEQUENT HOSPITAL CARE,LEVL II: ICD-10-PCS | Mod: ,,, | Performed by: PSYCHIATRY & NEUROLOGY

## 2023-04-28 PROCEDURE — 21400001 HC TELEMETRY ROOM

## 2023-04-28 PROCEDURE — 99232 SBSQ HOSP IP/OBS MODERATE 35: CPT | Mod: ,,, | Performed by: PSYCHIATRY & NEUROLOGY

## 2023-04-28 PROCEDURE — 84100 ASSAY OF PHOSPHORUS: CPT | Performed by: STUDENT IN AN ORGANIZED HEALTH CARE EDUCATION/TRAINING PROGRAM

## 2023-04-28 PROCEDURE — 83735 ASSAY OF MAGNESIUM: CPT | Performed by: STUDENT IN AN ORGANIZED HEALTH CARE EDUCATION/TRAINING PROGRAM

## 2023-04-28 RX ADMIN — ACETAMINOPHEN 650 MG: 325 TABLET ORAL at 03:04

## 2023-04-28 RX ADMIN — DIAZEPAM 10 MG: 5 TABLET ORAL at 09:04

## 2023-04-28 RX ADMIN — DIAZEPAM 10 MG: 5 TABLET ORAL at 02:04

## 2023-04-28 RX ADMIN — DULOXETINE HYDROCHLORIDE 60 MG: 60 CAPSULE, DELAYED RELEASE ORAL at 08:04

## 2023-04-28 RX ADMIN — TRAZODONE HYDROCHLORIDE 200 MG: 100 TABLET ORAL at 09:04

## 2023-04-28 RX ADMIN — Medication 100 MG: at 08:04

## 2023-04-28 RX ADMIN — LISINOPRIL 20 MG: 20 TABLET ORAL at 08:04

## 2023-04-28 RX ADMIN — LORAZEPAM 2 MG: 2 INJECTION INTRAMUSCULAR; INTRAVENOUS at 04:04

## 2023-04-28 RX ADMIN — FOLIC ACID 1 MG: 1 TABLET ORAL at 08:04

## 2023-04-28 RX ADMIN — DIAZEPAM 10 MG: 5 TABLET ORAL at 05:04

## 2023-04-28 RX ADMIN — LEVOTHYROXINE SODIUM 50 MCG: 50 TABLET ORAL at 05:04

## 2023-04-28 RX ADMIN — POTASSIUM PHOSPHATE, MONOBASIC AND POTASSIUM PHOSPHATE, DIBASIC 15 MMOL: 224; 236 INJECTION, SOLUTION, CONCENTRATE INTRAVENOUS at 04:04

## 2023-04-28 RX ADMIN — ARIPIPRAZOLE 5 MG: 5 TABLET ORAL at 08:04

## 2023-04-28 NOTE — PROGRESS NOTES
"      FOLLOW UP VISIT: ADDICTION PSYCHIATRY CONSULTATION SERVICE      ASSESSMENT AND PLAN:     DIAGNOSES & PROBLEMS:  Alcohol use disorder, severe  Tobacco use disorder, unspecified severity  Depression, unspecified    In Summary:  Pt is a 62 y.o F w/ PMH of tobacco use, alcohol abuse, and depression presenting due to c/o nausea, vomiting, diarrhea. Pt states she's been drinking vodka daily for many years off and on. She reports withdrawal symptoms with c/o n/v, weakness, shaking. She does have a hx of complicated withdrawals and seizures. Had a period of sobriety for 6 months prior to relapsing roughly 3 weeks ago because of marital conflicts. She has been "in and out of" rehab, detox, and AA meetings and is interested in attempting to quit again, open to outpatient rehab. Also states she's been smoking for many years off, usually cigarettes <1/2 ppd or vaping. She does not feel ready to quit tobacco at this time. Pt used to see a psychiatrist for her depression but stopped because "it's expensive" but is willing to go back. Would recommend restarting home meds and continuing benzo taper with alcohol withdrawals.       Plan:  - continue benzo taper per primary - on valium 10 mg Q8H  - continue PRN ativan per CIWA protocol   - thiamine, folate, multivitamin supplementation  - continue home cymbalta 60, abilify 5, trazodone 200  - pt counseled on substance cessation, resources provided - said she wants to [facility]     - continue alcohol withdrawal protocol while patient is in house, including supportive measures,frequent monitoring of vitals and CIWA-Ar, and initiation of PRN +/- standing benzodiazepines to minimize risk of complicated withdrawal  - patient counseled on abstinence from alcohol and substances of abuse (illicit and prescription)  - counseled on full engagement in 12 step (or equivalent) recovery program(s), including acquisition of a sponsor  - addiction resource sheet provided to patient  - " "relapse prevention and motivational interviewing provided         INTERVAL HISTORY AND ROS:     Pt was sleeping but was able to wake up for interview. Appeared to be drowsy but able to communicate. She said she didn't sleep too well and woke up at 2am and couldn't go  back to sleep, would be open to PRN meds for sleep. Denied any current withdrawal symptoms and said her mood and anxiety are fine now because she just woke up. Denied nausea and said her GI symptoms resolved. Said her  visited this morning and they said they'd try to work things out. Observed to have mild tremors on exam. Said she is already enrolled at [facility] and would like to continue.     CURRENT PSYCHOTROPIC REGIMEN:  Cymbalta 60 daily   Trazodone 200 qhs   Abilify 5 daily   Valium 10 mg q8H  PRN ativan 2 mg    PERTINENT PAST HISTORY AND CHART REVIEW:     Please see consult notes and H+P.     Received PRN ativan 2 mg x 5 yesterday, and @0412 this am.      EXAMINATION:     BP (!) 144/70 (BP Location: Left arm, Patient Position: Lying)   Pulse 85   Temp 97 °F (36.1 °C)   Resp 16   Ht 5' 7" (1.702 m)   Wt 78.3 kg (172 lb 9.9 oz)   SpO2 95%   Breastfeeding No   BMI 27.04 kg/m²     MENTAL STATUS EXAMINATION:  General Appearance & Behavior: **  adequately groomed, appropriately dressed, in no apparent distress, under good behavioral control  Involuntary Movements and Motor Activity: **  no abnormal involuntary movements noted, no psychomotor agitation or retardation  Speech & Language: **   mumbling and half-asleep   Mood: "okay"   Affect: **  appropriate to situation and context  Thought Process & Associations: **  linear superficially  Thought Content & Perceptions: **  no suicidal or homicidal ideation, no evidence of psychosis  Sensorium and Cognition: **  drowsy, oriented x 4  Insight & Judgment: **  demonstrates awareness of illness and situation      RISK MANAGEMENT:     The following risk parameters were assessed during this " evaluation:    I[]I Y  I[x]I N  I[]I U  I[]I A  Suicidal Ideation/Behavior: **   I[]I Y  I[x]I N  I[]I U  I[]I A  Homicidal Ideation/Behavior: **  I[]I Y  I[x]I N  I[]I U  I[]I A  Violence: **  I[]I Y  I[x]I N  I[]I U  I[]I A  Self-Injurious Behavior: **    The patient is deemed to be a reliable and factually accurate historian.    I[]I Y  I[x]I N  I[]I U  I[]I A  I[]I N/A  Minimization of Risk Parameters Suspected/Evident: **  I[]I Y  I[x]I N  I[]I U  I[]I A  I[]I N/A  Exaggeration of Risk Parameters Suspected/Evident: **    [] Y  [x] N  Danger to Self:   [] Y  [x] N  Danger to Others:   [] Y  [x] N  Grave Disability:     The patient does not currently meet the criteria for psychiatric admission and can be safely and effectively managed in a less restrictive level of care.    In cases of emergency, daily coverage provided by Acute/ER Psych MD, NP, PA, or SW, with contact numbers located in [facility] On Call Schedule.      Francesco Boyle MD  Department of Psychiatry  [facility]        KEY:     I[]I Y = Yes / Present / Endorses  I[]I N = No / Absent / Denies  I[]I U = Unknown / Unable to Assess / Unwilling to Participate  I[]I A = Ambiguity Exists / Accuracy Uncertain  I[]I D = Denial or Minimization is Suspected/Evident  I[]I N/A = Non-Applicable    CHART REVIEW:     Available documentation has been reviewed, and pertinent elements of the chart - including previous psychiatric evaluations - have been incorporated into this evaluation where appropriate.    ADVICE AND COUNSELING:     [x] In cases of emergencies (e.g. SI/HI resulting in danger to self or others, functioning deteriorates to the level of grave disability), call 911 or [phone], or present to the emergency department for immediate assistance.  [x] Patient should not operate a motor vehicle or heavy machinery if effects of medications or underlying symptoms/condition impair the ability to safely do so.    Alcohol, Tobacco, and Drug  Counseling, as well as resources, has been provided, as warranted.     Shared medical decision making and informed consent are the hallmark and bedrock of good clinical care, and as such have been employed and obtained, respectively, to the degree possible.      Risk Mitigation Strategies, Harm Reduction Techniques, and Safety Netting are important interventions that can reduce acute and chronic risk, and as such have been employed to the degree possible.    Prescription Drug Management entails the review, recommendation, or consideration without recommendation of medications, and as such was employed during the encounter.    Additional Psychoeducation has been provided, as warranted.    Discussed, to the extent possible, diagnosis, risks and benefits of proposed treatment vs alternative treatments vs no treatment, potential side effects of these treatments and the inherent unpredictability of treatment. The patient's ability to understand, participate and engage in a conversation surrounding this was deemed to be: adequate.     Written material has been provided to supplement, augment, and reinforce any discussions and interventions, via the AVS or other pre-printed handouts, as warranted.      DIAGNOSTIC TESTING:     The chart was reviewed for recent diagnostic procedures and investigations, and pertinent results are noted below.    Wt Readings from Last 2 Encounters:   04/26/23 78.3 kg (172 lb 9.9 oz)   04/06/23 81.7 kg (180 lb 1.9 oz)     BP Readings from Last 1 Encounters:   04/28/23 (!) 144/70     Pulse Readings from Last 1 Encounters:   04/28/23 85        Blood Counts, Electrolytes & Glucose: (i.e. WBC, ANC, Hemoglobin, Hematocrit, MCV, Platelets)  Lab Results   Component Value Date    WBC 3.96 04/28/2023    GRAN 1.1 (L) 04/28/2023    GRAN 28.7 (L) 04/28/2023    HGB 9.7 (L) 04/28/2023    HCT 32.1 (L) 04/28/2023    MCV 90 04/28/2023    PLT 63 (L) 04/28/2023     04/28/2023    K 3.2 (L) 04/28/2023     CALCIUM 8.7 04/28/2023    PHOS 2.5 (L) 04/28/2023    MG 1.7 04/28/2023    CO2 32 (H) 04/28/2023    ANIONGAP 9 04/28/2023     (H) 04/28/2023    HGBA1C 5.1 03/26/2023       Renal, Liver, Pancreas, Thyroid, Parathyroid, Prolactin, CPK, Lipids & Vitamin Levels: (i.e. Cr, BUN, Anion Gap, GFR, Urine Specific Gravity, Urine Protein, Microalburnin, AST, ALT, GGT, Alk Phos,Total Bili, Total Protein, Albumin, Ammonia, INR, Amylase, Lipase, TSH, Total T3, Total T4, Free T4 PTH, Prolactin, CPK, Cholesterol, Triglycerides, LDH, HDL, Vitamin B12, Folate, Vitamin D)  Lab Results   Component Value Date    CREATININE 0.6 04/28/2023    BUN 5 (L) 04/28/2023    EGFRNORACEVR >60.0 04/28/2023    SPECGRAV >1.030 (A) 04/26/2023    PROTEINUA Trace (A) 04/26/2023    AST 35 04/28/2023    ALT 21 04/28/2023     (H) 04/01/2011    ALKPHOS 45 (L) 04/28/2023    BILITOT 0.5 04/28/2023    LABPROT 11.8 03/26/2023    ALBUMIN 3.5 04/28/2023    AMMONIA 29 04/06/2023    INR 1.1 03/26/2023    AMYLASE 19 (L) 10/14/2014    LIPASE 18 04/25/2023    TSH 0.820 12/24/2022    FREET4 0.90 04/18/2022    PTH 45 10/15/2014    CPK 25 04/01/2023    CHOL 184 04/06/2023    TRIG 161 (H) 04/06/2023    LDLCALC 107.8 04/06/2023    HDL 44 04/06/2023    [id number] 542 04/06/2023    FOLATE 13.3 04/06/2023    THIAMINEBLOO 123 (H) 04/06/2023    [id number] 24 (L) 10/15/2014       Infection Diseases, Pregnancy Screenings & Drug Levels: (i.e. Hepatitis Panel, HIV, Syphilis, Urine & Blood Pregnancy Screens, beta hCG, Lithium, Valproic Acid, Carbamazepine, Lamotrigine, Phenytoin, Phenobarbital, Clozapine, Norclozapine, Clozapine + Norclozapine)   Lab Results   Component Value Date    HEPAIGM Negative 07/26/2017    HEPBIGM Positive (A) 07/26/2017    HEPCAB Non-reactive 03/10/2023    [id number] Non-reactive 03/10/2023    HCGQUANT <2.0 05/09/2006    LITHIUM <0.1 (L) 09/29/2016       Addiction: (i.e. Urine Toxicology, Blood Alcohol, PETH, EtG, EtS, CDT, Buprenorphine,  Norbuprenorphine)  Lab Results   Component Value Date    PCDSOALCOHOL 100 (A) 04/29/2022    PCDSOBENZOD Negative 04/29/2022    BARBITURATES Negative 04/29/2022    PCDSCOMETHA Negative 04/29/2022    OPIATESCREEN Negative 04/29/2022    COCAINEMETAB Negative 04/29/2022    AMPHETAMINES Negative 04/29/2022    MARIJUANATHC Presumptive Positive (A) 04/29/2022    PCDSOPHENCYN Negative 04/29/2022    [id number] 1092 09/27/2022    ALCOHOLMEDIC 86 (H) 04/25/2023       Results for orders placed or performed during the hospital encounter of 04/25/23   EKG 12-lead    Collection Time: 04/25/23 10:17 PM    Narrative    Test Reason : R11.2,    Vent. Rate : 117 BPM     Atrial Rate : 117 BPM     P-R Int : 130 ms          QRS Dur : 096 ms      QT Int : 372 ms       P-R-T Axes : 087 067 056 degrees     QTc Int : 518 ms    Sinus tachycardia  Otherwise normal ECG  When compared with ECG of 01-APR-2023 17:52,  No significant change was found  Confirmed by Areli Olsen MD ([id number]) on 4/26/2023 9:08:54 AM    Referred By: [email or web address]   SELF           Confirmed By:Areli Olsen MD       Results for orders placed or performed during the hospital encounter of 04/01/23   CT Head Without Contrast    Narrative    EXAMINATION:  CT HEAD WITHOUT CONTRAST    CLINICAL HISTORY:  Mental status change, unknown cause;    TECHNIQUE:  Low dose axial CT images obtained throughout the head without the use of intravenous contrast.  Axial, sagittal and coronal reconstructions were performed.    COMPARISON:  CT head without contrast 03/26/2023.    FINDINGS:  Intracranial compartment:    Generalized cerebral volume loss with compensatory dilation of the ventricles and sulci.  No evidence of hydrocephalus.    Supratentorial white matter hypoattenuation, nonspecific and suggestive of chronic microvascular ischemic change.  Stable hypodense focus in the left basal ganglia, likely representing remote lacunar type infarct.  No parenchymal mass, hemorrhage, edema or major  vascular distribution infarct.    No extra-axial blood or fluid collections.    Skull/extracranial contents (limited evaluation):    No fracture. Partial opacification of the right mastoid air cells.  Mild mucosal thickening within the paranasal sinuses.      Impression    1. No evidence of acute intracranial pathology.  Additional evaluation, as clinically warranted  2. Additional incidental findings, as above.    Electronically signed by resident: Angelina Bartlett  Date:    04/01/2023  Time:    21:21    Electronically signed by: Uche Montano MD  Date:    04/01/2023  Time:    21:38   Results for orders placed or performed during the hospital encounter of 06/10/22   MRI Brain Without Contrast    Narrative    EXAMINATION:  MRI BRAIN WITHOUT CONTRAST    CLINICAL HISTORY:  hx of PRES;    TECHNIQUE:  Multiplanar multisequence MR imaging of the brain was performed without intravenous contrast.    COMPARISON:  Head CT 06/10/2022, brain MRI 10/04/2017, 07/21/2017    FINDINGS:  Intracranial Compartment:    Ventricles are normal in size for age without evidence of hydrocephalus.    Ill-defined focus T2-FLAIR signal hyperintensity in the right periatrial white matter.  Few additional scattered punctate foci elsewhere within the supratentorial white matter.  These appear similar to the prior study of 10/04/2017.  No definite new focal lesions.  No diffusion restriction to indicate an acute infarction.  No remote major vascular distribution infarct.  No recent or remote hemorrhage.  No mass effect or midline shift.    No extra-axial blood or fluid collections.    Normal vascular flow voids are preserved.    Skull/Extracranial Contents (limited evaluation):    Bone marrow signal intensity is normal.      Impression    No evidence of acute intracranial pathology.      Electronically signed by: Miguel Malagon MD  Date:    06/10/2022  Time:    09:45

## 2023-04-28 NOTE — ASSESSMENT & PLAN NOTE
Patient reports watery diarrhea for the past week  Elevated WBC but lymphocytic predominance, in the setting of CLL differential includes Cdiff as well as CLL  Ordered C diff but diarrhea resolved so canceled collection.     Resolved

## 2023-04-28 NOTE — PLAN OF CARE
Problem: Adult Inpatient Plan of Care  Goal: Plan of Care Review  Outcome: Ongoing, Progressing  Goal: Patient-Specific Goal (Individualized)  Outcome: Ongoing, Progressing  Goal: Absence of Hospital-Acquired Illness or Injury  Outcome: Ongoing, Progressing  Goal: Optimal Comfort and Wellbeing  Outcome: Ongoing, Progressing  Goal: Readiness for Transition of Care  Outcome: Ongoing, Progressing     Problem: Diabetes Comorbidity  Goal: Blood Glucose Level Within Targeted Range  Outcome: Ongoing, Progressing     Problem: Infection  Goal: Absence of Infection Signs and Symptoms  Outcome: Ongoing, Progressing     Problem: Fall Injury Risk  Goal: Absence of Fall and Fall-Related Injury  Outcome: Ongoing, Progressing     Problem: Nausea and Vomiting  Goal: Fluid and Electrolyte Balance  Outcome: Ongoing, Progressing     Problem: Alcohol Withdrawal  Goal: Alcohol Withdrawal Symptom Control  Outcome: Ongoing, Progressing     Problem: Acute Neurologic Deterioration (Alcohol Withdrawal)  Goal: Optimal Neurologic Function  Outcome: Ongoing, Progressing     Problem: Substance Misuse (Alcohol Withdrawal)  Goal: Readiness for Change Identified  Outcome: Ongoing, Progressing     Problem: Coping Ineffective  Goal: Effective Coping  Outcome: Ongoing, Progressing     Problem: Skin Injury Risk Increased  Goal: Skin Health and Integrity  Outcome: Ongoing, Progressing     Problem: Violence Risk or Actual  Goal: Anger and Impulse Control  Outcome: Ongoing, Progressing   Pt is A,A,O x 4 . Stable  V/S . Pt complained of mild nausea during the day. No C/O pain. CIWA score is better today 4-5. No seizure activity during the day . Pt didn't required ativan today. No BMs, C-diff precautions  discontinued . Pt ambulated to the bathroom independently. Family was at the bedside this morning.  No falls or injuries per shift.

## 2023-04-28 NOTE — PLAN OF CARE
Problem: Adult Inpatient Plan of Care  Goal: Plan of Care Review  Outcome: Ongoing, Progressing  Goal: Patient-Specific Goal (Individualized)  Outcome: Ongoing, Progressing  Goal: Absence of Hospital-Acquired Illness or Injury  Outcome: Ongoing, Progressing  Goal: Optimal Comfort and Wellbeing  Outcome: Ongoing, Progressing     Problem: Infection  Goal: Absence of Infection Signs and Symptoms  Outcome: Ongoing, Progressing     Problem: Fall Injury Risk  Goal: Absence of Fall and Fall-Related Injury  Outcome: Ongoing, Progressing     POC reviewed with patient. All questions and concerns addressed. Fall/safety precautions implemented and maintained. CIWA q4h. No acute events noted this shift. Please see flowsheet for full assessment and vitals. Bed locked in lowest position. Side rails up x2. Call bell within reach. Will continue to monitor.

## 2023-04-28 NOTE — SUBJECTIVE & OBJECTIVE
Interval History: Still reporting drowsiness and sore throat, lozenges helping.   Diarrhea has resolved, canceled cdiff collection.   N.V controlled with PRNs tolerating PO.   CIWA 5 this morning, but overnight >8 PRN ativan given. CIWA are improving.    Tolerating valium taper, psych following.     Review of Systems   Constitutional:  Negative for chills and fever.   HENT:  Negative for trouble swallowing.    Eyes:  Negative for visual disturbance.   Respiratory:  Negative for shortness of breath.    Cardiovascular:  Negative for chest pain.   Gastrointestinal:  Negative for abdominal pain and diarrhea.   Genitourinary:  Negative for difficulty urinating.   Musculoskeletal:  Negative for neck stiffness.   Skin:  Negative for rash.   Neurological:  Negative for light-headedness.   Psychiatric/Behavioral:  Negative for confusion.    Objective:     Vital Signs (Most Recent):  Temp: 97 °F (36.1 °C) (04/28/23 0807)  Pulse: 85 (04/28/23 0807)  Resp: 16 (04/28/23 0807)  BP: (!) 144/70 (04/28/23 0807)  SpO2: 95 % (04/28/23 0807)   Vital Signs (24h Range):  Temp:  [97 °F (36.1 °C)-98.8 °F (37.1 °C)] 97 °F (36.1 °C)  Pulse:  [] 85  Resp:  [16-20] 16  SpO2:  [95 %-99 %] 95 %  BP: (144-155)/(70-80) 144/70     Weight: 78.3 kg (172 lb 9.9 oz)  Body mass index is 27.04 kg/m².    Intake/Output Summary (Last 24 hours) at 4/28/2023 1011  Last data filed at 4/28/2023 0116  Gross per 24 hour   Intake 808.17 ml   Output --   Net 808.17 ml        Physical Exam  Vitals and nursing note reviewed.   Constitutional:       General: She is not in acute distress.     Appearance: Normal appearance.   HENT:      Head: Normocephalic and atraumatic.      Right Ear: External ear normal.      Left Ear: External ear normal.      Nose: Nose normal.      Mouth/Throat:      Mouth: Mucous membranes are moist.      Pharynx: Oropharynx is clear.   Eyes:      Extraocular Movements: Extraocular movements intact.      Conjunctiva/sclera: Conjunctivae  normal.      Pupils: Pupils are equal, round, and reactive to light.   Cardiovascular:      Rate and Rhythm: Normal rate and regular rhythm.      Pulses: Normal pulses.      Heart sounds: Normal heart sounds.   Pulmonary:      Effort: Pulmonary effort is normal.      Breath sounds: Normal breath sounds.   Abdominal:      General: Abdomen is flat. Bowel sounds are normal.      Palpations: Abdomen is soft.   Musculoskeletal:         General: Normal range of motion.      Cervical back: Normal range of motion and neck supple.   Skin:     General: Skin is warm and dry.   Neurological:      General: No focal deficit present.      Mental Status: She is alert and oriented to person, place, and time.   Psychiatric:         Mood and Affect: Mood normal.         Behavior: Behavior normal.       Significant Labs: All pertinent labs within the past 24 hours have been reviewed.    Significant Imaging: I have reviewed all pertinent imaging results/findings within the past 24 hours.

## 2023-04-28 NOTE — PROGRESS NOTES
[facility] Medicine  Progress Note    Patient Name: Earl Abdul  MRN: [id number]  Patient Class: IP- Inpatient   Admission Date: 4/25/2023  Length of Stay: 2 days  Attending Physician: Susana Moreno MD  Primary Care Provider: Andrew Rodriguez MD        Subjective:     Principal Problem:Alcohol withdrawal        HPI:  Earl Abdul is a 62 year old female with a past medical history of tobacco use, alcohol abuse, depression with suicidal attempt (2017), history of breast cancer, CLL (dx on 6/2022, not on treatment), fatty liver disease.  She presented to the ER with nausea and vomiting for several days per ER.  She was discharged from her previous hospitalization about 3 weeks ago and states that she stopped drinking for about a week, and then began drinking again about 2 weeks prior to admission.  Her last drink was at 10 AM the day of admission.  She also continues to smoke cigarettes.  She reports abdominal pain, nausea, vomiting, diarrhea, and shortness of breath.  Denies any fevers.  Further history and ROS is limited due to patient uncooperative with exam and sleeping.       Upon chart review she has a history of significant alcohol withdrawal symptoms including seizures that the patient reports.  In the ER she was noted to be actively vomiting.  She appeared ill and short of breath, placed on nasal cannula.  She was tachycardic to the 120s, sinus tach.  Labs remarkable for a large anion gap of 28.  Patient with an DEVANTE, and hypoglycemic to the 50s on chemistry.  Alcohol level of 86, BOHB level of 7.  WBC elevated to 29K with a lymphocytic predominance.      She is admitted to hospital medicine for monitoring for alcohol withdrawals.        Overview/Hospital Course:  Admitted for alcohol withdrawal currently on valium taper, CIWA protocol in place. Still with elevated CIWA requiring additional PRN ativan  Diarrhea noted, Cdiff ordered but diarrhea resolved so canceled      Interval  History: Still reporting drowsiness and sore throat, lozenges helping.   Diarrhea has resolved, canceled cdiff collection.   N.V controlled with PRNs tolerating PO.   CIWA 5 this morning, but overnight >8 PRN ativan given. CIWA are improving.    Tolerating valium taper, psych following.     Review of Systems   Constitutional:  Negative for chills and fever.   HENT:  Negative for trouble swallowing.    Eyes:  Negative for visual disturbance.   Respiratory:  Negative for shortness of breath.    Cardiovascular:  Negative for chest pain.   Gastrointestinal:  Negative for abdominal pain and diarrhea.   Genitourinary:  Negative for difficulty urinating.   Musculoskeletal:  Negative for neck stiffness.   Skin:  Negative for rash.   Neurological:  Negative for light-headedness.   Psychiatric/Behavioral:  Negative for confusion.    Objective:     Vital Signs (Most Recent):  Temp: 97 °F (36.1 °C) (04/28/23 0807)  Pulse: 85 (04/28/23 0807)  Resp: 16 (04/28/23 0807)  BP: (!) 144/70 (04/28/23 0807)  SpO2: 95 % (04/28/23 0807)   Vital Signs (24h Range):  Temp:  [97 °F (36.1 °C)-98.8 °F (37.1 °C)] 97 °F (36.1 °C)  Pulse:  [] 85  Resp:  [16-20] 16  SpO2:  [95 %-99 %] 95 %  BP: (144-155)/(70-80) 144/70     Weight: 78.3 kg (172 lb 9.9 oz)  Body mass index is 27.04 kg/m².    Intake/Output Summary (Last 24 hours) at 4/28/2023 1011  Last data filed at 4/28/2023 0116  Gross per 24 hour   Intake 808.17 ml   Output --   Net 808.17 ml        Physical Exam  Vitals and nursing note reviewed.   Constitutional:       General: She is not in acute distress.     Appearance: Normal appearance.   HENT:      Head: Normocephalic and atraumatic.      Right Ear: External ear normal.      Left Ear: External ear normal.      Nose: Nose normal.      Mouth/Throat:      Mouth: Mucous membranes are moist.      Pharynx: Oropharynx is clear.   Eyes:      Extraocular Movements: Extraocular movements intact.      Conjunctiva/sclera: Conjunctivae normal.       Pupils: Pupils are equal, round, and reactive to light.   Cardiovascular:      Rate and Rhythm: Normal rate and regular rhythm.      Pulses: Normal pulses.      Heart sounds: Normal heart sounds.   Pulmonary:      Effort: Pulmonary effort is normal.      Breath sounds: Normal breath sounds.   Abdominal:      General: Abdomen is flat. Bowel sounds are normal.      Palpations: Abdomen is soft.   Musculoskeletal:         General: Normal range of motion.      Cervical back: Normal range of motion and neck supple.   Skin:     General: Skin is warm and dry.   Neurological:      General: No focal deficit present.      Mental Status: She is alert and oriented to person, place, and time.   Psychiatric:         Mood and Affect: Mood normal.         Behavior: Behavior normal.       Significant Labs: All pertinent labs within the past 24 hours have been reviewed.    Significant Imaging: I have reviewed all pertinent imaging results/findings within the past 24 hours.      Assessment/Plan:      * Alcohol withdrawal  Continue CIWA-Ar monitoring  Given thiamine 100mg IV x 1 and start on daily thiamine, folic acid  Continue valium taper  Ativan PRN for breakthrough  withdrawal symptoms  Monitor for seizure like activity      Goals of care, counseling/discussion  Palliative following      Advance care planning  Palliative care following      Palliative care encounter  - consulted, appreciate recs      Depression  - psych following  - cont home meds        CLL (chronic lymphocytic leukemia)  Patient diagnosed via FISH on 6/2022  Will followup with [facility] outpt    Alcoholic ketoacidosis  Patient with significantly elevated anion gap, BOHB elevated  Alcoholic ketosis, hypoglycemia.  Monitor for refeeding syndrome, daily phos- replete PRN  Given thiamine 100mg IV x 1, cont daily therapy  Improved    Nausea  - PRN zofran      History of substance abuse  Patient with multiple readmissions related to alcohol use  Would benefit from psychiatric  evaluation      Diarrhea  Patient reports watery diarrhea for the past week  Elevated WBC but lymphocytic predominance, in the setting of CLL differential includes Cdiff as well as CLL  Ordered C diff but diarrhea resolved so canceled collection.     Resolved      VTE Risk Mitigation (From admission, onward)         Ordered     IP VTE HIGH RISK PATIENT  Once         04/26/23 0322     Place sequential compression device  Until discontinued         04/26/23 0322     Place sequential compression device  Until discontinued         04/26/23 0319                Discharge Planning   BECCA: 4/30/2023     Code Status: Full Code   Is the patient medically ready for discharge?: No    Reason for patient still in hospital (select all that apply): Patient trending condition, treatment  Discharge Plan A: Home, Home with family                  Susana Moreno MD  Department of Hospital Medicine   [facility]

## 2023-04-28 NOTE — ASSESSMENT & PLAN NOTE
Patient with significantly elevated anion gap, BOHB elevated  Alcoholic ketosis, hypoglycemia.  Monitor for refeeding syndrome, daily phos- replete PRN  Given thiamine 100mg IV x 1, cont daily therapy  Improved

## 2023-04-29 PROBLEM — J96.01 ACUTE HYPOXEMIC RESPIRATORY FAILURE: Status: ACTIVE | Noted: 2023-04-29

## 2023-04-29 LAB
ALBUMIN SERPL BCP-MCNC: 3.6 G/DL (ref 3.5–5.2)
ALP SERPL-CCNC: 44 U/L (ref 55–135)
ALT SERPL W/O P-5'-P-CCNC: 34 U/L (ref 10–44)
ANION GAP SERPL CALC-SCNC: 10 MMOL/L (ref 8–16)
AST SERPL-CCNC: 53 U/L (ref 10–40)
BASOPHILS # BLD AUTO: 0 K/UL (ref 0–0.2)
BASOPHILS NFR BLD: 0 % (ref 0–1.9)
BILIRUB SERPL-MCNC: 0.5 MG/DL (ref 0.1–1)
BUN SERPL-MCNC: 4 MG/DL (ref 8–23)
CALCIUM SERPL-MCNC: 9 MG/DL (ref 8.7–10.5)
CHLORIDE SERPL-SCNC: 96 MMOL/L (ref 95–110)
CO2 SERPL-SCNC: 30 MMOL/L (ref 23–29)
CREAT SERPL-MCNC: 0.7 MG/DL (ref 0.5–1.4)
DIFFERENTIAL METHOD: ABNORMAL
EOSINOPHIL # BLD AUTO: 0.1 K/UL (ref 0–0.5)
EOSINOPHIL NFR BLD: 1.6 % (ref 0–8)
ERYTHROCYTE [DISTWIDTH] IN BLOOD BY AUTOMATED COUNT: 17 % (ref 11.5–14.5)
EST. GFR  (NO RACE VARIABLE): >60 ML/MIN/1.73 M^2
GLUCOSE SERPL-MCNC: 151 MG/DL (ref 70–110)
HCT VFR BLD AUTO: 33.1 % (ref 37–48.5)
HGB BLD-MCNC: 9.9 G/DL (ref 12–16)
IMM GRANULOCYTES # BLD AUTO: 0.02 K/UL (ref 0–0.04)
IMM GRANULOCYTES NFR BLD AUTO: 0.5 % (ref 0–0.5)
LYMPHOCYTES # BLD AUTO: 2.3 K/UL (ref 1–4.8)
LYMPHOCYTES NFR BLD: 60.7 % (ref 18–48)
MAGNESIUM SERPL-MCNC: 1.5 MG/DL (ref 1.6–2.6)
MCH RBC QN AUTO: 27.3 PG (ref 27–31)
MCHC RBC AUTO-ENTMCNC: 29.9 G/DL (ref 32–36)
MCV RBC AUTO: 91 FL (ref 82–98)
MONOCYTES # BLD AUTO: 0.3 K/UL (ref 0.3–1)
MONOCYTES NFR BLD: 8.2 % (ref 4–15)
NEUTROPHILS # BLD AUTO: 1.1 K/UL (ref 1.8–7.7)
NEUTROPHILS NFR BLD: 29 % (ref 38–73)
NRBC BLD-RTO: 0 /100 WBC
PHOSPHATE SERPL-MCNC: 3.8 MG/DL (ref 2.7–4.5)
PLATELET # BLD AUTO: 54 K/UL (ref 150–450)
PMV BLD AUTO: 10.4 FL (ref 9.2–12.9)
POTASSIUM SERPL-SCNC: 4 MMOL/L (ref 3.5–5.1)
PROT SERPL-MCNC: 6.1 G/DL (ref 6–8.4)
RBC # BLD AUTO: 3.63 M/UL (ref 4–5.4)
SODIUM SERPL-SCNC: 136 MMOL/L (ref 136–145)
WBC # BLD AUTO: 3.79 K/UL (ref 3.9–12.7)

## 2023-04-29 PROCEDURE — 83735 ASSAY OF MAGNESIUM: CPT | Performed by: STUDENT IN AN ORGANIZED HEALTH CARE EDUCATION/TRAINING PROGRAM

## 2023-04-29 PROCEDURE — 25000003 PHARM REV CODE 250: Performed by: STUDENT IN AN ORGANIZED HEALTH CARE EDUCATION/TRAINING PROGRAM

## 2023-04-29 PROCEDURE — 63600175 PHARM REV CODE 636 W HCPCS: Performed by: INTERNAL MEDICINE

## 2023-04-29 PROCEDURE — 63600175 PHARM REV CODE 636 W HCPCS: Performed by: STUDENT IN AN ORGANIZED HEALTH CARE EDUCATION/TRAINING PROGRAM

## 2023-04-29 PROCEDURE — 80053 COMPREHEN METABOLIC PANEL: CPT | Performed by: STUDENT IN AN ORGANIZED HEALTH CARE EDUCATION/TRAINING PROGRAM

## 2023-04-29 PROCEDURE — 36415 COLL VENOUS BLD VENIPUNCTURE: CPT | Performed by: STUDENT IN AN ORGANIZED HEALTH CARE EDUCATION/TRAINING PROGRAM

## 2023-04-29 PROCEDURE — 99232 PR SUBSEQUENT HOSPITAL CARE,LEVL II: ICD-10-PCS | Mod: ,,, | Performed by: STUDENT IN AN ORGANIZED HEALTH CARE EDUCATION/TRAINING PROGRAM

## 2023-04-29 PROCEDURE — S4991 NICOTINE PATCH NONLEGEND: HCPCS | Performed by: STUDENT IN AN ORGANIZED HEALTH CARE EDUCATION/TRAINING PROGRAM

## 2023-04-29 PROCEDURE — 21400001 HC TELEMETRY ROOM

## 2023-04-29 PROCEDURE — 84100 ASSAY OF PHOSPHORUS: CPT | Performed by: STUDENT IN AN ORGANIZED HEALTH CARE EDUCATION/TRAINING PROGRAM

## 2023-04-29 PROCEDURE — 99232 SBSQ HOSP IP/OBS MODERATE 35: CPT | Mod: ,,, | Performed by: STUDENT IN AN ORGANIZED HEALTH CARE EDUCATION/TRAINING PROGRAM

## 2023-04-29 PROCEDURE — 85025 COMPLETE CBC W/AUTO DIFF WBC: CPT | Performed by: STUDENT IN AN ORGANIZED HEALTH CARE EDUCATION/TRAINING PROGRAM

## 2023-04-29 RX ORDER — DIAZEPAM 5 MG/1
10 TABLET ORAL 2 TIMES DAILY
Status: DISCONTINUED | OUTPATIENT
Start: 2023-04-29 | End: 2023-04-30 | Stop reason: HOSPADM

## 2023-04-29 RX ORDER — IBUPROFEN 200 MG
1 TABLET ORAL DAILY
Status: DISCONTINUED | OUTPATIENT
Start: 2023-04-29 | End: 2023-04-30 | Stop reason: HOSPADM

## 2023-04-29 RX ORDER — MAGNESIUM SULFATE 1 G/100ML
1 INJECTION INTRAVENOUS ONCE
Status: COMPLETED | OUTPATIENT
Start: 2023-04-29 | End: 2023-04-29

## 2023-04-29 RX ADMIN — TRAZODONE HYDROCHLORIDE 200 MG: 100 TABLET ORAL at 09:04

## 2023-04-29 RX ADMIN — DULOXETINE HYDROCHLORIDE 60 MG: 60 CAPSULE, DELAYED RELEASE ORAL at 09:04

## 2023-04-29 RX ADMIN — DIAZEPAM 10 MG: 5 TABLET ORAL at 06:04

## 2023-04-29 RX ADMIN — NICOTINE 1 PATCH: 14 PATCH, EXTENDED RELEASE TRANSDERMAL at 11:04

## 2023-04-29 RX ADMIN — Medication 100 MG: at 09:04

## 2023-04-29 RX ADMIN — ACETAMINOPHEN 650 MG: 325 TABLET ORAL at 09:04

## 2023-04-29 RX ADMIN — MAGNESIUM SULFATE HEPTAHYDRATE 1 G: 500 INJECTION, SOLUTION INTRAMUSCULAR; INTRAVENOUS at 01:04

## 2023-04-29 RX ADMIN — PROCHLORPERAZINE EDISYLATE 2.5 MG: 5 INJECTION INTRAMUSCULAR; INTRAVENOUS at 03:04

## 2023-04-29 RX ADMIN — DIAZEPAM 10 MG: 5 TABLET ORAL at 09:04

## 2023-04-29 RX ADMIN — ARIPIPRAZOLE 5 MG: 5 TABLET ORAL at 09:04

## 2023-04-29 RX ADMIN — LISINOPRIL 20 MG: 20 TABLET ORAL at 09:04

## 2023-04-29 RX ADMIN — ONDANSETRON 4 MG: 2 INJECTION INTRAMUSCULAR; INTRAVENOUS at 10:04

## 2023-04-29 RX ADMIN — ONDANSETRON 4 MG: 2 INJECTION INTRAMUSCULAR; INTRAVENOUS at 09:04

## 2023-04-29 RX ADMIN — FOLIC ACID 1 MG: 1 TABLET ORAL at 09:04

## 2023-04-29 RX ADMIN — ACETAMINOPHEN 650 MG: 325 TABLET ORAL at 05:04

## 2023-04-29 RX ADMIN — ACETAMINOPHEN 650 MG: 325 TABLET ORAL at 04:04

## 2023-04-29 RX ADMIN — LEVOTHYROXINE SODIUM 50 MCG: 50 TABLET ORAL at 06:04

## 2023-04-29 NOTE — ASSESSMENT & PLAN NOTE
Patient with multiple readmissions related to alcohol use  psychiatric evaluation  - rehab/IOP at discharge

## 2023-04-29 NOTE — ASSESSMENT & PLAN NOTE
Continue CIWA-Ar monitoring  Given thiamine 100mg IV x 1 and start on daily thiamine, folic acid  Continue valium taper  Ativan PRN for breakthrough  withdrawal symptoms  Monitor for seizure like activity    - CIWA improving, not needing IV ativan

## 2023-04-29 NOTE — PROGRESS NOTES
[facility] Medicine  Progress Note    Patient Name: Earl Abdul  MRN: [id number]  Patient Class: IP- Inpatient   Admission Date: 4/25/2023  Length of Stay: 3 days  Attending Physician: Susana Moreno MD  Primary Care Provider: Andrew Rodriguez MD        Subjective:     Principal Problem:Alcohol withdrawal        HPI:  Earl Abdul is a 62 year old female with a past medical history of tobacco use, alcohol abuse, depression with suicidal attempt (2017), history of breast cancer, CLL (dx on 6/2022, not on treatment), fatty liver disease.  She presented to the ER with nausea and vomiting for several days per ER.  She was discharged from her previous hospitalization about 3 weeks ago and states that she stopped drinking for about a week, and then began drinking again about 2 weeks prior to admission.  Her last drink was at 10 AM the day of admission.  She also continues to smoke cigarettes.  She reports abdominal pain, nausea, vomiting, diarrhea, and shortness of breath.  Denies any fevers.  Further history and ROS is limited due to patient uncooperative with exam and sleeping.       Upon chart review she has a history of significant alcohol withdrawal symptoms including seizures that the patient reports.  In the ER she was noted to be actively vomiting.  She appeared ill and short of breath, placed on nasal cannula.  She was tachycardic to the 120s, sinus tach.  Labs remarkable for a large anion gap of 28.  Patient with an DEVANTE, and hypoglycemic to the 50s on chemistry.  Alcohol level of 86, BOHB level of 7.  WBC elevated to 29K with a lymphocytic predominance.      She is admitted to hospital medicine for monitoring for alcohol withdrawals.        Overview/Hospital Course:  Admitted for alcohol withdrawal currently on valium taper, CIWA protocol in place.CIWA improving, PRN ativan CIWA >8  Diarrhea noted, Cdiff ordered but diarrhea resolved so canceled      Interval History:  Patient reports  no issues overnight. Tolerating PO, denies diarrhea.   N.V controlled with PRNs. Patient with gel nails, so pulse ox reading low and patient put on O2, when pulse ox put on earlobe sats are fine.   CIWA improving, 4-5, has not needed IV ativan.   Tolerating valium taper, psych following.   Plt low, denies bleeding.   Mag low replete    updated at bedside     Review of Systems   Constitutional:  Negative for chills and fever.   HENT:  Negative for trouble swallowing.    Eyes:  Negative for visual disturbance.   Respiratory:  Negative for shortness of breath.    Cardiovascular:  Negative for chest pain.   Gastrointestinal:  Negative for abdominal pain and diarrhea.   Genitourinary:  Negative for difficulty urinating.   Musculoskeletal:  Negative for neck stiffness.   Skin:  Negative for rash.   Neurological:  Negative for light-headedness.   Psychiatric/Behavioral:  Negative for confusion. The patient is nervous/anxious.    Objective:     Vital Signs (Most Recent):  Temp: 98.3 °F (36.8 °C) (04/29/23 0924)  Pulse: 91 (04/29/23 0842)  Resp: 18 (04/29/23 0842)  BP: 133/60 (04/29/23 0842)  SpO2: (!) 90 % (04/29/23 0842)   Vital Signs (24h Range):  Temp:  [97.1 °F (36.2 °C)-100.2 °F (37.9 °C)] 98.3 °F (36.8 °C)  Pulse:  [] 91  Resp:  [15-23] 18  SpO2:  [90 %-98 %] 90 %  BP: (133-162)/(60-91) 133/60     Weight: 78.3 kg (172 lb 9.9 oz)  Body mass index is 27.04 kg/m².    Intake/Output Summary (Last 24 hours) at 4/29/2023 1224  Last data filed at 4/28/2023 1738  Gross per 24 hour   Intake 220 ml   Output --   Net 220 ml        Physical Exam  Vitals and nursing note reviewed.   Constitutional:       General: She is not in acute distress.     Appearance: Normal appearance.   HENT:      Head: Normocephalic and atraumatic.      Right Ear: External ear normal.      Left Ear: External ear normal.      Nose: Nose normal.      Mouth/Throat:      Mouth: Mucous membranes are moist.      Pharynx: Oropharynx is clear.    Eyes:      Extraocular Movements: Extraocular movements intact.      Conjunctiva/sclera: Conjunctivae normal.      Pupils: Pupils are equal, round, and reactive to light.   Cardiovascular:      Rate and Rhythm: Normal rate and regular rhythm.      Pulses: Normal pulses.      Heart sounds: Normal heart sounds.   Pulmonary:      Effort: Pulmonary effort is normal.      Breath sounds: Normal breath sounds.   Abdominal:      General: Abdomen is flat. Bowel sounds are normal.      Palpations: Abdomen is soft.   Musculoskeletal:         General: Normal range of motion.      Cervical back: Normal range of motion and neck supple.   Skin:     General: Skin is warm and dry.   Neurological:      General: No focal deficit present.      Mental Status: She is alert and oriented to person, place, and time.   Psychiatric:         Mood and Affect: Mood normal.         Behavior: Behavior normal.       Significant Labs: All pertinent labs within the past 24 hours have been reviewed.    Significant Imaging: I have reviewed all pertinent imaging results/findings within the past 24 hours.      Assessment/Plan:      * Alcohol withdrawal  Continue CIWA-Ar monitoring  Given thiamine 100mg IV x 1 and start on daily thiamine, folic acid  Continue valium taper  Ativan PRN for breakthrough  withdrawal symptoms  Monitor for seizure like activity    - CIWA improving, not needing IV ativan    Goals of care, counseling/discussion  Palliative following      Advance care planning  Palliative care following      Palliative care encounter  - consulted, appreciate recs      Depression  - psych following  - cont home meds        CLL (chronic lymphocytic leukemia)  Patient diagnosed via FISH on 6/2022  Will followup with [facility] outpt    Alcoholic ketoacidosis  Patient with significantly elevated anion gap, BOHB elevated  Alcoholic ketosis, hypoglycemia.  Monitor for refeeding syndrome, daily phos- replete PRN  Given thiamine 100mg IV x 1, cont daily  therapy  Improved    Hypomagnesemia  - replete PRN      Nausea  - PRN zofran      History of substance abuse  Patient with multiple readmissions related to alcohol use  psychiatric evaluation  - rehab/IOP at discharge      Diarrhea  Patient reports watery diarrhea for the past week  Elevated WBC but lymphocytic predominance, in the setting of CLL differential includes Cdiff as well as CLL  Ordered C diff but diarrhea resolved so canceled collection.     Resolved      VTE Risk Mitigation (From admission, onward)         Ordered     IP VTE HIGH RISK PATIENT  Once         04/26/23 0322     Place sequential compression device  Until discontinued         04/26/23 0322     Place sequential compression device  Until discontinued         04/26/23 0319                Discharge Planning   BECCA: 4/30/2023     Code Status: Full Code   Is the patient medically ready for discharge?: No    Reason for patient still in hospital (select all that apply): Patient trending condition  Discharge Plan A: Home, Home with family                  Susana Moreno MD  Department of Hospital Medicine   [facility]

## 2023-04-29 NOTE — SUBJECTIVE & OBJECTIVE
Interval History:  Patient reports no issues overnight. Tolerating PO, denies diarrhea.   N.V controlled with PRNs. Patient with gel nails, so pulse ox reading low and patient put on O2, when pulse ox put on earlobe sats are fine.   CIWA improving, 4-5, has not needed IV ativan.   Tolerating valium taper, psych following.   Plt low, denies bleeding.   Mag low replete    updated at bedside     Review of Systems   Constitutional:  Negative for chills and fever.   HENT:  Negative for trouble swallowing.    Eyes:  Negative for visual disturbance.   Respiratory:  Negative for shortness of breath.    Cardiovascular:  Negative for chest pain.   Gastrointestinal:  Negative for abdominal pain and diarrhea.   Genitourinary:  Negative for difficulty urinating.   Musculoskeletal:  Negative for neck stiffness.   Skin:  Negative for rash.   Neurological:  Negative for light-headedness.   Psychiatric/Behavioral:  Negative for confusion. The patient is nervous/anxious.    Objective:     Vital Signs (Most Recent):  Temp: 98.3 °F (36.8 °C) (04/29/23 0924)  Pulse: 91 (04/29/23 0842)  Resp: 18 (04/29/23 0842)  BP: 133/60 (04/29/23 0842)  SpO2: (!) 90 % (04/29/23 0842)   Vital Signs (24h Range):  Temp:  [97.1 °F (36.2 °C)-100.2 °F (37.9 °C)] 98.3 °F (36.8 °C)  Pulse:  [] 91  Resp:  [15-23] 18  SpO2:  [90 %-98 %] 90 %  BP: (133-162)/(60-91) 133/60     Weight: 78.3 kg (172 lb 9.9 oz)  Body mass index is 27.04 kg/m².    Intake/Output Summary (Last 24 hours) at 4/29/2023 1224  Last data filed at 4/28/2023 1738  Gross per 24 hour   Intake 220 ml   Output --   Net 220 ml        Physical Exam  Vitals and nursing note reviewed.   Constitutional:       General: She is not in acute distress.     Appearance: Normal appearance.   HENT:      Head: Normocephalic and atraumatic.      Right Ear: External ear normal.      Left Ear: External ear normal.      Nose: Nose normal.      Mouth/Throat:      Mouth: Mucous membranes are moist.       Pharynx: Oropharynx is clear.   Eyes:      Extraocular Movements: Extraocular movements intact.      Conjunctiva/sclera: Conjunctivae normal.      Pupils: Pupils are equal, round, and reactive to light.   Cardiovascular:      Rate and Rhythm: Normal rate and regular rhythm.      Pulses: Normal pulses.      Heart sounds: Normal heart sounds.   Pulmonary:      Effort: Pulmonary effort is normal.      Breath sounds: Normal breath sounds.   Abdominal:      General: Abdomen is flat. Bowel sounds are normal.      Palpations: Abdomen is soft.   Musculoskeletal:         General: Normal range of motion.      Cervical back: Normal range of motion and neck supple.   Skin:     General: Skin is warm and dry.   Neurological:      General: No focal deficit present.      Mental Status: She is alert and oriented to person, place, and time.   Psychiatric:         Mood and Affect: Mood normal.         Behavior: Behavior normal.       Significant Labs: All pertinent labs within the past 24 hours have been reviewed.    Significant Imaging: I have reviewed all pertinent imaging results/findings within the past 24 hours.

## 2023-04-29 NOTE — NURSING
"Presents to the ER with c/o pressure wound to bilateral buttocks that started 6 days ago per daughter. Patient has been able to walk with a cane until 5 days ago and now needs to use a wheel chair. Associated complaints are increased fatigue. Patient has Stage II pressure wound to bilateral buttocks with and area of eschar to left buttock. Daughter reports that she attempts to put patient on her side and "She rolls back." Mucous membranes are pink and moist. Skin is warm and dry. Lungs are clear bilaterally, respirations are regular and unlabored. Denies cough, congestion, rhinorrhea or SOB. BS active x4, no tenderness with palpation, abd is soft and not distended. Denies any appetite or activity change. S1S2, capillary refill is < 2 seconds. Denies dysuria, difficulty urinating, frequency, numbness, tingling or weakness. XIMENA SANCHEZS    " Patient was caught using vap pen from the rapid response team during their rounds.

## 2023-04-29 NOTE — ASSESSMENT & PLAN NOTE
Patient with Hypercapnic and Hypoxic Respiratory failure which is Acute on chronic.  she is not on home oxygen. Supplemental oxygen was provided and noted-      .   Signs/symptoms of respiratory failure include- none, comfortable on NC. Contributing diagnoses includes - COPD Labs and images were reviewed. Patient Has recent ABG, which has been reviewed. Will treat underlying causes and adjust management of respiratory failure as follows-     - supplemental O2, wean as tolerated

## 2023-04-30 VITALS
WEIGHT: 172.63 LBS | SYSTOLIC BLOOD PRESSURE: 158 MMHG | TEMPERATURE: 98 F | HEART RATE: 97 BPM | RESPIRATION RATE: 18 BRPM | DIASTOLIC BLOOD PRESSURE: 80 MMHG | BODY MASS INDEX: 27.09 KG/M2 | OXYGEN SATURATION: 95 % | HEIGHT: 67 IN

## 2023-04-30 LAB
ALBUMIN SERPL BCP-MCNC: 3.6 G/DL (ref 3.5–5.2)
ALP SERPL-CCNC: 44 U/L (ref 55–135)
ALT SERPL W/O P-5'-P-CCNC: 31 U/L (ref 10–44)
ANION GAP SERPL CALC-SCNC: 9 MMOL/L (ref 8–16)
AST SERPL-CCNC: 47 U/L (ref 10–40)
BILIRUB SERPL-MCNC: 0.4 MG/DL (ref 0.1–1)
BUN SERPL-MCNC: 5 MG/DL (ref 8–23)
CALCIUM SERPL-MCNC: 8.9 MG/DL (ref 8.7–10.5)
CHLORIDE SERPL-SCNC: 97 MMOL/L (ref 95–110)
CO2 SERPL-SCNC: 29 MMOL/L (ref 23–29)
CREAT SERPL-MCNC: 0.7 MG/DL (ref 0.5–1.4)
EST. GFR  (NO RACE VARIABLE): >60 ML/MIN/1.73 M^2
GLUCOSE SERPL-MCNC: 115 MG/DL (ref 70–110)
MAGNESIUM SERPL-MCNC: 1.8 MG/DL (ref 1.6–2.6)
PHOSPHATE SERPL-MCNC: 3.5 MG/DL (ref 2.7–4.5)
POTASSIUM SERPL-SCNC: 4 MMOL/L (ref 3.5–5.1)
PROT SERPL-MCNC: 6.2 G/DL (ref 6–8.4)
SODIUM SERPL-SCNC: 135 MMOL/L (ref 136–145)

## 2023-04-30 PROCEDURE — 63600175 PHARM REV CODE 636 W HCPCS: Performed by: STUDENT IN AN ORGANIZED HEALTH CARE EDUCATION/TRAINING PROGRAM

## 2023-04-30 PROCEDURE — 36415 COLL VENOUS BLD VENIPUNCTURE: CPT | Performed by: STUDENT IN AN ORGANIZED HEALTH CARE EDUCATION/TRAINING PROGRAM

## 2023-04-30 PROCEDURE — 83735 ASSAY OF MAGNESIUM: CPT | Performed by: STUDENT IN AN ORGANIZED HEALTH CARE EDUCATION/TRAINING PROGRAM

## 2023-04-30 PROCEDURE — 25000003 PHARM REV CODE 250: Performed by: STUDENT IN AN ORGANIZED HEALTH CARE EDUCATION/TRAINING PROGRAM

## 2023-04-30 PROCEDURE — 99239 HOSP IP/OBS DSCHRG MGMT >30: CPT | Mod: ,,, | Performed by: STUDENT IN AN ORGANIZED HEALTH CARE EDUCATION/TRAINING PROGRAM

## 2023-04-30 PROCEDURE — 80053 COMPREHEN METABOLIC PANEL: CPT | Performed by: STUDENT IN AN ORGANIZED HEALTH CARE EDUCATION/TRAINING PROGRAM

## 2023-04-30 PROCEDURE — 99239 PR HOSPITAL DISCHARGE DAY,>30 MIN: ICD-10-PCS | Mod: ,,, | Performed by: STUDENT IN AN ORGANIZED HEALTH CARE EDUCATION/TRAINING PROGRAM

## 2023-04-30 PROCEDURE — 84100 ASSAY OF PHOSPHORUS: CPT | Performed by: STUDENT IN AN ORGANIZED HEALTH CARE EDUCATION/TRAINING PROGRAM

## 2023-04-30 RX ORDER — ARIPIPRAZOLE 5 MG/1
5 TABLET ORAL DAILY
Qty: 30 TABLET | Refills: 2 | Status: SHIPPED | OUTPATIENT
Start: 2023-04-30 | End: 2023-05-15

## 2023-04-30 RX ORDER — DIAZEPAM 5 MG/1
TABLET ORAL
Qty: 11 TABLET | Refills: 0 | Status: ON HOLD | OUTPATIENT
Start: 2023-04-30 | End: 2023-05-18

## 2023-04-30 RX ORDER — TRAZODONE HYDROCHLORIDE 300 MG/1
300 TABLET ORAL NIGHTLY
Qty: 30 TABLET | Refills: 2 | Status: SHIPPED | OUTPATIENT
Start: 2023-04-30 | End: 2023-06-02

## 2023-04-30 RX ORDER — FLUTICASONE FUROATE AND VILANTEROL 100; 25 UG/1; UG/1
1 POWDER RESPIRATORY (INHALATION) DAILY
Qty: 60 EACH | Refills: 3 | Status: ON HOLD | OUTPATIENT
Start: 2023-04-30 | End: 2023-11-29 | Stop reason: HOSPADM

## 2023-04-30 RX ORDER — LISINOPRIL 20 MG/1
20 TABLET ORAL DAILY
Qty: 30 TABLET | Refills: 2 | Status: ON HOLD | OUTPATIENT
Start: 2023-04-30 | End: 2023-11-06 | Stop reason: HOSPADM

## 2023-04-30 RX ORDER — LANOLIN ALCOHOL/MO/W.PET/CERES
100 CREAM (GRAM) TOPICAL DAILY
Qty: 30 TABLET | Refills: 11 | Status: ON HOLD | OUTPATIENT
Start: 2023-04-30 | End: 2023-11-24

## 2023-04-30 RX ADMIN — DULOXETINE HYDROCHLORIDE 60 MG: 60 CAPSULE, DELAYED RELEASE ORAL at 08:04

## 2023-04-30 RX ADMIN — ACETAMINOPHEN 650 MG: 325 TABLET ORAL at 05:04

## 2023-04-30 RX ADMIN — LORAZEPAM 2 MG: 2 INJECTION INTRAMUSCULAR; INTRAVENOUS at 05:04

## 2023-04-30 RX ADMIN — Medication 100 MG: at 08:04

## 2023-04-30 RX ADMIN — FOLIC ACID 1 MG: 1 TABLET ORAL at 08:04

## 2023-04-30 RX ADMIN — LISINOPRIL 20 MG: 20 TABLET ORAL at 08:04

## 2023-04-30 RX ADMIN — THERA TABS 1 TABLET: TAB at 09:04

## 2023-04-30 RX ADMIN — DIAZEPAM 10 MG: 5 TABLET ORAL at 08:04

## 2023-04-30 RX ADMIN — ARIPIPRAZOLE 5 MG: 5 TABLET ORAL at 08:04

## 2023-04-30 RX ADMIN — LEVOTHYROXINE SODIUM 50 MCG: 50 TABLET ORAL at 05:04

## 2023-04-30 NOTE — ASSESSMENT & PLAN NOTE
Patient with multiple readmissions related to alcohol use  psychiatric evaluation  - rehab/IOP at discharge     57

## 2023-04-30 NOTE — PLAN OF CARE
[facility]  Discharge Final Note    Primary Care Provider: Andrew Rodriguez MD    Expected Discharge Date: 4/30/2023    Final Discharge Note (most recent)       Final Note - 04/30/23 1050          Final Note    Assessment Type Final Discharge Note     Anticipated Discharge Disposition Home or Self Care        Post-Acute Status    Discharge Delays None known at this time                     Important Message from Medicare             Contact Info       Andrew Rodriguez MD   Specialty: Internal Medicine   Relationship: PCP - General    [address] [place] [id number]   Phone: [phone]       Next Steps: Schedule an appointment as soon as possible for a visit in 1 week(s)    Instructions: Hospital followup, Please check CBC, Mag, Phos, CMP with followup          Eun Bueno MSW, LCSW  Weekend -[facility]  [facility] [phone]

## 2023-04-30 NOTE — DISCHARGE SUMMARY
[facility] Medicine  Discharge Summary      Patient Name: Earl Abdul  MRN: [id number]  IZA: [id number]  Patient Class: IP- Inpatient  Admission Date: 4/25/2023  Hospital Length of Stay: 4 days  Discharge Date and Time: No discharge date for patient encounter.  Attending Physician: Susana Moreno MD   Discharging Provider: Susana Moreno MD  Primary Care Provider: Andrew Rodriguez MD  [facility] Medicine Team: [facility] Susana Moreno MD  Primary Care Team: [facility]    HPI:   Earl Abdul is a 62 year old female with a past medical history of tobacco use, alcohol abuse, depression with suicidal attempt (2017), history of breast cancer, CLL (dx on 6/2022, not on treatment), fatty liver disease.  She presented to the ER with nausea and vomiting for several days per ER.  She was discharged from her previous hospitalization about 3 weeks ago and states that she stopped drinking for about a week, and then began drinking again about 2 weeks prior to admission.  Her last drink was at 10 AM the day of admission.  She also continues to smoke cigarettes.  She reports abdominal pain, nausea, vomiting, diarrhea, and shortness of breath.  Denies any fevers.  Further history and ROS is limited due to patient uncooperative with exam and sleeping.       Upon chart review she has a history of significant alcohol withdrawal symptoms including seizures that the patient reports.  In the ER she was noted to be actively vomiting.  She appeared ill and short of breath, placed on nasal cannula.  She was tachycardic to the 120s, sinus tach.  Labs remarkable for a large anion gap of 28.  Patient with an DEVANTE, and hypoglycemic to the 50s on chemistry.  Alcohol level of 86, BOHB level of 7.  WBC elevated to 29K with a lymphocytic predominance.      She is admitted to hospital medicine for monitoring for alcohol withdrawals.        * No surgery found *      Hospital Course:   Admitted for alcohol withdrawal  currently on valium taper, CIWA protocol in place.CIWA improving, PRN ativan CIWA >8  Diarrhea noted, Cdiff ordered but diarrhea resolved so canceled.  Replete electrolytes as needed.   Patient stable CIWA <5, day 5 post alcohol intake. No seizure noted. Tolerating valium taper.   Patient wanting to go home. Stable for discharge to continue valium taper. Followup with psych and addiction medicine referral sent.   Recommend f/u with PCP with labs. Cont thiamine, folic acid and MV.   F/U with [facility] regarding CLL outpt, apt scheduled.          Goals of Care Treatment Preferences:  Code Status: Full Code    Health care agent: Spouse is NOK  Health care agent number: No value filed.          What is most important right now is to focus on curative/life-prolongation (regardless of treatment burdens).  Accordingly, we have decided that the best plan to meet the patient's goals includes continuing with treatment.      Consults:   Consults (From admission, onward)        Status Ordering Provider     Inpatient consult to Midline team  Once        Provider:  (Not yet assigned)    Completed JENNIFER ACE     Inpatient consult to Social Work  Once        Provider:  (Not yet assigned)    Completed ELINA SIDDIQI     Inpatient consult to Psychiatry  Once        Provider:  (Not yet assigned)    Completed ELINA SIDDIQI     Inpatient consult to Palliative Care  Once        Provider:  (Not yet assigned)    Completed ELINA SIDDIQI          Psychiatric  * Alcohol withdrawal  Continue CIWA-Ar monitoring  Given thiamine 100mg IV x 1 and start on daily thiamine, folic acid  Continue valium taper  Ativan PRN for breakthrough  withdrawal symptoms  Monitor for seizure like activity    - CIWA improving, not needing IV ativan    Depression  - psych following  - cont home meds        History of substance abuse  Patient with multiple readmissions related to alcohol use  psychiatric evaluation  - rehab/IOP at  discharge      Renal/  Alcoholic ketoacidosis  Patient with significantly elevated anion gap, BOHB elevated  Alcoholic ketosis, hypoglycemia.  Monitor for refeeding syndrome, daily phos- replete PRN  Given thiamine 100mg IV x 1, cont daily therapy  Improved    Hypomagnesemia  - replete PRN      Oncology  CLL (chronic lymphocytic leukemia)  Patient diagnosed via FISH on 6/2022  Will followup with [facility] outpt    GI  Nausea  - PRN zofran      Diarrhea  Patient reports watery diarrhea for the past week  Elevated WBC but lymphocytic predominance, in the setting of CLL differential includes Cdiff as well as CLL  Ordered C diff but diarrhea resolved so canceled collection.     Resolved    Palliative Care  Goals of care, counseling/discussion  Palliative following      Advance care planning  Palliative care following      Palliative care encounter  - consulted, appreciate recs        Final Active Diagnoses:    Diagnosis Date Noted POA    PRINCIPAL PROBLEM:  Alcohol withdrawal [F10.939] 02/07/2014 Yes    Palliative care encounter [Z51.5] 04/27/2023 Not Applicable    Advance care planning [Z71.89] 04/27/2023 Not Applicable    Goals of care, counseling/discussion [Z71.89] 04/27/2023 Not Applicable    Depression [F32.A] 04/26/2023 Yes    CLL (chronic lymphocytic leukemia) [C91.10] 09/30/2022 Yes    Alcoholic ketoacidosis [E87.29] 04/29/2022 Yes    Hypomagnesemia [E83.42] 10/19/2014 Unknown    Nausea [R11.0] 10/16/2014 Yes    Diarrhea [R19.7] 10/15/2014 Yes    History of substance abuse [F19.11] 10/15/2014 Yes     Chronic      Problems Resolved During this Admission:       Discharged Condition: stable    Disposition: Home or Self Care    Follow Up:   Follow-up Information     Andrew Rodriguez MD. Schedule an appointment as soon as possible for a visit in 1 week(s).    Specialty: Internal Medicine  Why: Hospital followup, Please check CBC, Mag, Phos, CMP with followup  Contact information:  [address]  [address]  [facility] [address]  [phone]                       Patient Instructions:      Ambulatory referral/consult to [facility]   Standing Status: Future   Referral Priority: Routine Referral Type: Consultation   Referral Reason: Specialty Services Required   Number of Visits Requested: 1     Ambulatory referral/consult to Smoking Cessation Program   Standing Status: Future   Referral Priority: Routine Referral Type: Consultation   Referral Reason: Specialty Services Required   Requested Specialty: CTTS   Number of Visits Requested: 1     Ambulatory referral/consult to Psychiatry   Standing Status: Future   Referral Priority: Urgent Referral Type: Psychiatric   Referral Reason: Specialty Services Required   Referred to Provider: CHANTE AVENDANO Requested Specialty: Psychiatry   Number of Visits Requested: 1     Diet Adult Regular     Notify your health care provider if you experience any of the following:  persistent nausea and vomiting or diarrhea     Notify your health care provider if you experience any of the following:  increased confusion or weakness     Reason for not Prescribing Nicotine Replacement     Order Specific Question Answer Comments   Reason for not Prescribing: Patient refused      Activity as tolerated     Ambulatory Request for Ready Responder Services   Standing Status: Future Standing Exp. Date: 04/28/24     Order Specific Question Answer Comments   Program referred to: COVID-19 Vaccine        Significant Diagnostic Studies: Labs:   BMP:   Recent Labs   Lab 04/29/23  1038 04/30/23  0554   * 115*    135*   K 4.0 4.0   CL 96 97   CO2 30* 29   BUN 4* 5*   CREATININE 0.7 0.7   CALCIUM 9.0 8.9   MG 1.5* 1.8    and CBC   Recent Labs   Lab 04/29/23  1038   WBC 3.79*   HGB 9.9*   HCT 33.1*   PLT 54*       Pending Diagnostic Studies:     Procedure Component Value Units Date/Time    Vitamin B1 [[id number]] Collected: 04/26/23 1034    Order Status: Sent  Lab Status: In process Updated: 23 1100    Specimen: Blood          Medications:  Reconciled Home Medications:      Medication List      START taking these medications    diazePAM 5 MG tablet  Commonly known as: VALIUM  Take 2 tablets (10 mg total) by mouth 2 (two) times a day for 1 day, THEN 1 tablet (5 mg total) 2 (two) times a day for 2 days, THEN 0.5 tablets (2.5 mg total) 2 (two) times a day for 2 days, THEN 0.5 tablets (2.5 mg total) once daily for 2 days.  Start taking on: 2023        CHANGE how you take these medications    lisinopriL 20 MG tablet  Commonly known as: PRINIVIL,ZESTRIL  Take 1 tablet (20 mg total) by mouth once daily.  What changed: when to take this        CONTINUE taking these medications    acetaminophen 500 MG tablet  Commonly known as: TYLENOL  Take 500-1,500 mg by mouth daily as needed for Pain.     ARIPiprazole 5 MG Tab  Commonly known as: ABILIFY  Take 1 tablet (5 mg total) by mouth once daily.     BREO ELLIPTA 100-25 mcg/dose diskus inhaler  Generic drug: fluticasone furoate-vilanteroL  Inhale 1 puff into the lungs once daily. Controller     cholecalciferol (vitamin D3) 125 mcg (5,000 unit) capsule  Take 5,000 Units by mouth.     CombiVENT RESPIMAT  mcg/actuation inhaler  Generic drug: ipratropium-albuteroL  SMARTSI Puff(s) Via Inhaler Every 6 Hours PRN     dicyclomine 20 mg tablet  Commonly known as: BENTYL  Take 20 mg by mouth 4 (four) times daily.     * DULoxetine 30 MG capsule  Commonly known as: CYMBALTA  Take 1 capsule (30 mg total) by mouth once daily. Take with 60mg =90mg QD     * DULoxetine 60 MG capsule  Commonly known as: CYMBALTA  Take 60 mg by mouth once daily.     folic acid 1 MG tablet  Commonly known as: FOLVITE  Take 1 tablet (1 mg total) by mouth once daily.     isosorbide mononitrate 30 MG 24 hr tablet  Commonly known as: IMDUR  Take 1 tablet (30 mg total) by mouth every evening.     levothyroxine 50 MCG tablet  Commonly known as:  SYNTHROID  Take 1 tablet (50 mcg total) by mouth before breakfast.     loperamide 2 mg capsule  Commonly known as: IMODIUM  Take 2 mg by mouth 4 (four) times daily as needed for Diarrhea (Per package directions).     melatonin  Commonly known as: MELATIN  TAKE 1/2 TABLET TO 1 TABLET BY MOUTH EVERY NIGHT AT BEDTIME     metFORMIN 500 MG ER 24hr tablet  Commonly known as: GLUCOPHAGE-XR  Take 2 tablets (1,000 mg total) by mouth 2 (two) times daily with meals.     methocarbamoL 750 MG Tab  Commonly known as: ROBAXIN  Take 750 mg by mouth 3 (three) times daily as needed (Pain).     multivitamin per tablet  Commonly known as: THERAGRAN  Take 1 tablet by mouth once daily.     ondansetron 4 MG Tbdl  Commonly known as: ZOFRAN ODT  Take 1 tablet (4 mg total) by mouth every 6 (six) hours as needed (nausea).     pantoprazole 40 MG tablet  Commonly known as: PROTONIX  Take 1 tablet (40 mg total) by mouth every morning.     propranoloL 20 MG tablet  Commonly known as: INDERAL  Take 1 tablet (20 mg total) by mouth 2 (two) times daily.     thiamine 100 MG tablet  Take 1 tablet (100 mg total) by mouth once daily.     traZODone 300 MG tablet  Commonly known as: DESYREL  Take 1 tablet (300 mg total) by mouth every evening.         * This list has 2 medication(s) that are the same as other medications prescribed for you. Read the directions carefully, and ask your doctor or other care provider to review them with you.            STOP taking these medications    acamprosate 333 mg tablet  Commonly known as: CAMPRAL     anastrozole 1 mg Tab  Commonly known as: ARIMIDEX     gabapentin 600 MG tablet  Commonly known as: NEURONTIN     hydrOXYzine pamoate 50 MG Cap  Commonly known as: VISTARIL            Indwelling Lines/Drains at time of discharge:   Lines/Drains/Airways     None                 Time spent on the discharge of patient: 40 minutes         Susana Moreno MD  Department of Hospital Medicine  [facility]

## 2023-04-30 NOTE — PLAN OF CARE
Problem: Adult Inpatient Plan of Care  Goal: Plan of Care Review  Outcome: Met  Goal: Patient-Specific Goal (Individualized)  Outcome: Met  Goal: Absence of Hospital-Acquired Illness or Injury  4/30/2023 1408 by Martha Arboleda RN  Outcome: Met  4/30/2023 1408 by Martha Arboleda RN  Outcome: Ongoing, Progressing  Goal: Optimal Comfort and Wellbeing  4/30/2023 1408 by Martha Arboleda RN  Outcome: Met  4/30/2023 1408 by Martha Arboleda RN  Outcome: Ongoing, Progressing  Goal: Readiness for Transition of Care  Outcome: Met     Problem: Diabetes Comorbidity  Goal: Blood Glucose Level Within Targeted Range  Outcome: Met     Problem: Infection  Goal: Absence of Infection Signs and Symptoms  Outcome: Met     Problem: Fall Injury Risk  Goal: Absence of Fall and Fall-Related Injury  4/30/2023 1408 by Martha Arboleda RN  Outcome: Met  4/30/2023 1408 by Martha Arboleda RN  Outcome: Ongoing, Progressing     Problem: Nausea and Vomiting  Goal: Fluid and Electrolyte Balance  Outcome: Met     Problem: Alcohol Withdrawal  Goal: Alcohol Withdrawal Symptom Control  4/30/2023 1408 by Martha Arboleda RN  Outcome: Met  4/30/2023 1408 by Martha Arboleda RN  Outcome: Ongoing, Progressing     Problem: Acute Neurologic Deterioration (Alcohol Withdrawal)  Goal: Optimal Neurologic Function  Outcome: Met     Problem: Substance Misuse (Alcohol Withdrawal)  Goal: Readiness for Change Identified  Outcome: Met     Problem: Coping Ineffective  Goal: Effective Coping  Outcome: Met     Problem: Skin Injury Risk Increased  Goal: Skin Health and Integrity  Outcome: Met     Problem: Violence Risk or Actual  Goal: Anger and Impulse Control  Outcome: Met

## 2023-04-30 NOTE — PLAN OF CARE
Problem: Adult Inpatient Plan of Care  Goal: Absence of Hospital-Acquired Illness or Injury  Outcome: Ongoing, Progressing  Goal: Optimal Comfort and Wellbeing  Outcome: Ongoing, Progressing     Problem: Fall Injury Risk  Goal: Absence of Fall and Fall-Related Injury  Outcome: Ongoing, Progressing     Problem: Alcohol Withdrawal  Goal: Alcohol Withdrawal Symptom Control  Outcome: Ongoing, Progressing

## 2023-05-01 LAB
BACTERIA BLD CULT: NORMAL
BACTERIA BLD CULT: NORMAL

## 2023-05-02 ENCOUNTER — PATIENT OUTREACH (OUTPATIENT)
Dept: ADMINISTRATIVE | Facility: CLINIC | Age: 63
End: 2023-05-02
Payer: COMMERCIAL

## 2023-05-02 NOTE — PROGRESS NOTES
C3 nurse attempted to contact Earl Abdul  for a TCC post hospital discharge follow up call. No answer. Left voicemail with callback information. The patient does not have a scheduled HOSFU appointment. Message sent to PCP staff for assistance with scheduling visit with patient.

## 2023-05-02 NOTE — PROGRESS NOTES
C3 nurse attempted to contact patient. The following occurred:   C3 nurse attempted to contact Earl Abdul  for a TCC post hospital discharge follow up call. The patient is unable to conduct the call @ this time. The patient requested a callback.    The patient does not have a scheduled HOSFU appointment within 5-7 days post hospital discharge date 04/30/2023. Message sent to Physician staff to assist with HOSFU appointment scheduling.

## 2023-05-03 LAB — VIT B1 BLD-MCNC: 141 UG/L (ref 38–122)

## 2023-05-03 NOTE — TELEPHONE ENCOUNTER
Spoke with pt and held slots and sent override request to [employer] per message below:    Please schedule MRN: [id number] hospital f/u w/ Dr. Rodriguez on 5/12/2023 at 3pm and 3:20pm for 40min visit

## 2023-05-04 ENCOUNTER — PES CALL (OUTPATIENT)
Dept: ADMINISTRATIVE | Facility: CLINIC | Age: 63
End: 2023-05-04
Payer: COMMERCIAL

## 2023-05-11 ENCOUNTER — PATIENT MESSAGE (OUTPATIENT)
Dept: HEMATOLOGY/ONCOLOGY | Facility: CLINIC | Age: 63
End: 2023-05-11
Payer: COMMERCIAL

## 2023-05-11 DIAGNOSIS — C91.10 CLL (CHRONIC LYMPHOCYTIC LEUKEMIA): Primary | ICD-10-CM

## 2023-05-15 ENCOUNTER — HOSPITAL ENCOUNTER (INPATIENT)
Facility: HOSPITAL | Age: 63
LOS: 6 days | Discharge: HOME | DRG: 897 | End: 2023-05-21
Attending: EMERGENCY MEDICINE | Admitting: EMERGENCY MEDICINE
Payer: COMMERCIAL

## 2023-05-15 DIAGNOSIS — F32.89 OTHER DEPRESSION: ICD-10-CM

## 2023-05-15 DIAGNOSIS — R07.9 CHEST PAIN: ICD-10-CM

## 2023-05-15 DIAGNOSIS — N17.9 AKI (ACUTE KIDNEY INJURY): Primary | ICD-10-CM

## 2023-05-15 DIAGNOSIS — F10.151 ALCOHOL ABUSE WITH ALCOHOL-INDUCED PSYCHOTIC DISORDER WITH HALLUCINATIONS: ICD-10-CM

## 2023-05-15 DIAGNOSIS — Z51.5 PALLIATIVE CARE ENCOUNTER: ICD-10-CM

## 2023-05-15 DIAGNOSIS — E87.29 ALCOHOLIC KETOACIDOSIS: ICD-10-CM

## 2023-05-15 DIAGNOSIS — E11.65 UNCONTROLLED TYPE 2 DIABETES MELLITUS WITH HYPERGLYCEMIA: ICD-10-CM

## 2023-05-15 DIAGNOSIS — F10.20 ALCOHOL USE DISORDER, SEVERE, DEPENDENCE: Chronic | ICD-10-CM

## 2023-05-15 DIAGNOSIS — C91.10 CLL (CHRONIC LYMPHOCYTIC LEUKEMIA): ICD-10-CM

## 2023-05-15 DIAGNOSIS — F10.939 ALCOHOL WITHDRAWAL SYNDROME WITH COMPLICATION: ICD-10-CM

## 2023-05-15 DIAGNOSIS — R11.0 NAUSEA: ICD-10-CM

## 2023-05-15 LAB
ALBUMIN SERPL BCP-MCNC: 3.9 G/DL (ref 3.5–5.2)
ALBUMIN SERPL BCP-MCNC: 4.1 G/DL (ref 3.5–5.2)
ALLENS TEST: ABNORMAL
ALP SERPL-CCNC: 48 U/L (ref 55–135)
ALT SERPL W/O P-5'-P-CCNC: 20 U/L (ref 10–44)
AMPHET+METHAMPHET UR QL: NEGATIVE
ANION GAP SERPL CALC-SCNC: 15 MMOL/L (ref 8–16)
ANION GAP SERPL CALC-SCNC: 26 MMOL/L (ref 8–16)
AST SERPL-CCNC: 35 U/L (ref 10–40)
BACTERIA #/AREA URNS AUTO: ABNORMAL /HPF
BARBITURATES UR QL SCN>200 NG/ML: NEGATIVE
BASOPHILS NFR BLD: 0 % (ref 0–1.9)
BENZODIAZ UR QL SCN>200 NG/ML: ABNORMAL
BILIRUB SERPL-MCNC: 0.7 MG/DL (ref 0.1–1)
BILIRUB UR QL STRIP: NEGATIVE
BUN SERPL-MCNC: 27 MG/DL (ref 8–23)
BUN SERPL-MCNC: 33 MG/DL (ref 6–30)
BUN SERPL-MCNC: 34 MG/DL (ref 8–23)
BZE UR QL SCN: NEGATIVE
CALCIUM SERPL-MCNC: 9.1 MG/DL (ref 8.7–10.5)
CALCIUM SERPL-MCNC: 9.3 MG/DL (ref 8.7–10.5)
CANNABINOIDS UR QL SCN: NEGATIVE
CHLORIDE SERPL-SCNC: 90 MMOL/L (ref 95–110)
CHLORIDE SERPL-SCNC: 94 MMOL/L (ref 95–110)
CHLORIDE SERPL-SCNC: 95 MMOL/L (ref 95–110)
CLARITY UR REFRACT.AUTO: CLEAR
CO2 SERPL-SCNC: 15 MMOL/L (ref 23–29)
CO2 SERPL-SCNC: 25 MMOL/L (ref 23–29)
COLOR UR AUTO: YELLOW
CREAT SERPL-MCNC: 1.7 MG/DL (ref 0.5–1.4)
CREAT SERPL-MCNC: 2.2 MG/DL (ref 0.5–1.4)
CREAT SERPL-MCNC: 2.5 MG/DL (ref 0.5–1.4)
CREAT UR-MCNC: 103 MG/DL (ref 15–325)
DIFFERENTIAL METHOD: ABNORMAL
EOSINOPHIL NFR BLD: 1 % (ref 0–8)
ERYTHROCYTE [DISTWIDTH] IN BLOOD BY AUTOMATED COUNT: 18.5 % (ref 11.5–14.5)
EST. GFR  (NO RACE VARIABLE): 24.4 ML/MIN/1.73 M^2
EST. GFR  (NO RACE VARIABLE): 33.3 ML/MIN/1.73 M^2
ETHANOL UR-MCNC: 14 MG/DL
GLUCOSE SERPL-MCNC: 124 MG/DL (ref 70–110)
GLUCOSE SERPL-MCNC: 233 MG/DL (ref 70–110)
GLUCOSE SERPL-MCNC: 234 MG/DL (ref 70–110)
GLUCOSE UR QL STRIP: NEGATIVE
HCO3 UR-SCNC: 20.2 MMOL/L (ref 24–28)
HCT VFR BLD AUTO: 38.1 % (ref 37–48.5)
HCT VFR BLD CALC: 40 %PCV (ref 36–54)
HGB BLD-MCNC: 11.1 G/DL (ref 12–16)
HGB UR QL STRIP: NEGATIVE
HYALINE CASTS UR QL AUTO: 3 /LPF
IMM GRANULOCYTES # BLD AUTO: ABNORMAL K/UL (ref 0–0.04)
IMM GRANULOCYTES NFR BLD AUTO: ABNORMAL % (ref 0–0.5)
INR PPP: 0.9 (ref 0.8–1.2)
KETONES UR QL STRIP: ABNORMAL
LACTATE SERPL-SCNC: 2.5 MMOL/L (ref 0.5–2.2)
LEUKOCYTE ESTERASE UR QL STRIP: ABNORMAL
LIPASE SERPL-CCNC: 16 U/L (ref 4–60)
LYMPHOCYTES NFR BLD: 79 % (ref 18–48)
MAGNESIUM SERPL-MCNC: 1.3 MG/DL (ref 1.6–2.6)
MCH RBC QN AUTO: 27.5 PG (ref 27–31)
MCHC RBC AUTO-ENTMCNC: 29.1 G/DL (ref 32–36)
MCV RBC AUTO: 95 FL (ref 82–98)
METHADONE UR QL SCN>300 NG/ML: NEGATIVE
MICROSCOPIC COMMENT: ABNORMAL
MONOCYTES NFR BLD: 0 % (ref 4–15)
NEUTROPHILS NFR BLD: 20 % (ref 38–73)
NITRITE UR QL STRIP: POSITIVE
NRBC BLD-RTO: 0 /100 WBC
OPIATES UR QL SCN: NEGATIVE
PCO2 BLDA: 46.2 MMHG (ref 35–45)
PCP UR QL SCN>25 NG/ML: NEGATIVE
PH SMN: 7.25 [PH] (ref 7.35–7.45)
PH UR STRIP: 5 [PH] (ref 5–8)
PHOSPHATE SERPL-MCNC: 2.1 MG/DL (ref 2.7–4.5)
PLATELET # BLD AUTO: 149 K/UL (ref 150–450)
PLATELET BLD QL SMEAR: ABNORMAL
PMV BLD AUTO: 10.6 FL (ref 9.2–12.9)
PO2 BLDA: 60 MMHG (ref 40–60)
POC BE: -7 MMOL/L
POC IONIZED CALCIUM: 1.05 MMOL/L (ref 1.06–1.42)
POC SATURATED O2: 86 % (ref 95–100)
POC TCO2 (MEASURED): 18 MMOL/L (ref 23–29)
POC TCO2: 22 MMOL/L (ref 24–29)
POCT GLUCOSE: 187 MG/DL (ref 70–110)
POCT GLUCOSE: 46 MG/DL (ref 70–110)
POCT GLUCOSE: 57 MG/DL (ref 70–110)
POLYCHROMASIA BLD QL SMEAR: ABNORMAL
POTASSIUM BLD-SCNC: 4.9 MMOL/L (ref 3.5–5.1)
POTASSIUM SERPL-SCNC: 4.6 MMOL/L (ref 3.5–5.1)
POTASSIUM SERPL-SCNC: 5.1 MMOL/L (ref 3.5–5.1)
PROT SERPL-MCNC: 6.9 G/DL (ref 6–8.4)
PROT UR QL STRIP: ABNORMAL
PROT UR-MCNC: 22 MG/DL (ref 0–15)
PROT/CREAT UR: 0.21 MG/G{CREAT} (ref 0–0.2)
PROTHROMBIN TIME: 10.2 SEC (ref 9–12.5)
RBC # BLD AUTO: 4.03 M/UL (ref 4–5.4)
RBC #/AREA URNS AUTO: 0 /HPF (ref 0–4)
SAMPLE: ABNORMAL
SAMPLE: ABNORMAL
SITE: ABNORMAL
SMUDGE CELLS BLD QL SMEAR: PRESENT
SODIUM BLD-SCNC: 129 MMOL/L (ref 136–145)
SODIUM SERPL-SCNC: 131 MMOL/L (ref 136–145)
SODIUM SERPL-SCNC: 134 MMOL/L (ref 136–145)
SODIUM UR-SCNC: 36 MMOL/L (ref 20–250)
SP GR UR STRIP: 1.01 (ref 1–1.03)
SQUAMOUS #/AREA URNS AUTO: 2 /HPF
TOXICOLOGY INFORMATION: ABNORMAL
URN SPEC COLLECT METH UR: ABNORMAL
UUN UR-MCNC: 490 MG/DL (ref 140–1050)
WBC # BLD AUTO: 20.11 K/UL (ref 3.9–12.7)
WBC #/AREA URNS AUTO: 70 /HPF (ref 0–5)

## 2023-05-15 PROCEDURE — 94761 N-INVAS EAR/PLS OXIMETRY MLT: CPT

## 2023-05-15 PROCEDURE — 93010 EKG 12-LEAD: ICD-10-PCS | Mod: ,,, | Performed by: INTERNAL MEDICINE

## 2023-05-15 PROCEDURE — 83690 ASSAY OF LIPASE: CPT | Performed by: EMERGENCY MEDICINE

## 2023-05-15 PROCEDURE — 87088 URINE BACTERIA CULTURE: CPT | Performed by: EMERGENCY MEDICINE

## 2023-05-15 PROCEDURE — 93010 ELECTROCARDIOGRAM REPORT: CPT | Mod: ,,, | Performed by: INTERNAL MEDICINE

## 2023-05-15 PROCEDURE — 87186 SC STD MICRODIL/AGAR DIL: CPT | Performed by: EMERGENCY MEDICINE

## 2023-05-15 PROCEDURE — 96374 THER/PROPH/DIAG INJ IV PUSH: CPT

## 2023-05-15 PROCEDURE — 63600175 PHARM REV CODE 636 W HCPCS: Performed by: EMERGENCY MEDICINE

## 2023-05-15 PROCEDURE — 25000003 PHARM REV CODE 250

## 2023-05-15 PROCEDURE — 11000001 HC ACUTE MED/SURG PRIVATE ROOM

## 2023-05-15 PROCEDURE — 80307 DRUG TEST PRSMV CHEM ANLYZR: CPT

## 2023-05-15 PROCEDURE — 63600175 PHARM REV CODE 636 W HCPCS

## 2023-05-15 PROCEDURE — 85027 COMPLETE CBC AUTOMATED: CPT | Performed by: EMERGENCY MEDICINE

## 2023-05-15 PROCEDURE — 80053 COMPREHEN METABOLIC PANEL: CPT | Performed by: EMERGENCY MEDICINE

## 2023-05-15 PROCEDURE — 93005 ELECTROCARDIOGRAM TRACING: CPT

## 2023-05-15 PROCEDURE — 87077 CULTURE AEROBIC IDENTIFY: CPT | Performed by: EMERGENCY MEDICINE

## 2023-05-15 PROCEDURE — 99223 1ST HOSP IP/OBS HIGH 75: CPT | Mod: ,,, | Performed by: STUDENT IN AN ORGANIZED HEALTH CARE EDUCATION/TRAINING PROGRAM

## 2023-05-15 PROCEDURE — 99291 CRITICAL CARE FIRST HOUR: CPT

## 2023-05-15 PROCEDURE — 80069 RENAL FUNCTION PANEL: CPT

## 2023-05-15 PROCEDURE — 99900031 HC PATIENT EDUCATION (STAT)

## 2023-05-15 PROCEDURE — 84300 ASSAY OF URINE SODIUM: CPT

## 2023-05-15 PROCEDURE — 83605 ASSAY OF LACTIC ACID: CPT | Performed by: EMERGENCY MEDICINE

## 2023-05-15 PROCEDURE — 96361 HYDRATE IV INFUSION ADD-ON: CPT

## 2023-05-15 PROCEDURE — 82962 GLUCOSE BLOOD TEST: CPT

## 2023-05-15 PROCEDURE — 99291 CRITICAL CARE FIRST HOUR: CPT | Mod: ,,, | Performed by: EMERGENCY MEDICINE

## 2023-05-15 PROCEDURE — 99223 PR INITIAL HOSPITAL CARE,LEVL III: ICD-10-PCS | Mod: ,,, | Performed by: STUDENT IN AN ORGANIZED HEALTH CARE EDUCATION/TRAINING PROGRAM

## 2023-05-15 PROCEDURE — 84156 ASSAY OF PROTEIN URINE: CPT

## 2023-05-15 PROCEDURE — 85007 BL SMEAR W/DIFF WBC COUNT: CPT | Performed by: EMERGENCY MEDICINE

## 2023-05-15 PROCEDURE — 82803 BLOOD GASES ANY COMBINATION: CPT

## 2023-05-15 PROCEDURE — 99900035 HC TECH TIME PER 15 MIN (STAT)

## 2023-05-15 PROCEDURE — 87086 URINE CULTURE/COLONY COUNT: CPT | Performed by: EMERGENCY MEDICINE

## 2023-05-15 PROCEDURE — 81001 URINALYSIS AUTO W/SCOPE: CPT | Performed by: EMERGENCY MEDICINE

## 2023-05-15 PROCEDURE — 80047 BASIC METABLC PNL IONIZED CA: CPT

## 2023-05-15 PROCEDURE — 99291 PR CRITICAL CARE, E/M 30-74 MINUTES: ICD-10-PCS | Mod: ,,, | Performed by: EMERGENCY MEDICINE

## 2023-05-15 PROCEDURE — 85610 PROTHROMBIN TIME: CPT | Performed by: EMERGENCY MEDICINE

## 2023-05-15 PROCEDURE — 27000221 HC OXYGEN, UP TO 24 HOURS

## 2023-05-15 PROCEDURE — 83735 ASSAY OF MAGNESIUM: CPT | Performed by: EMERGENCY MEDICINE

## 2023-05-15 PROCEDURE — 84540 ASSAY OF URINE/UREA-N: CPT

## 2023-05-15 PROCEDURE — 25000003 PHARM REV CODE 250: Performed by: STUDENT IN AN ORGANIZED HEALTH CARE EDUCATION/TRAINING PROGRAM

## 2023-05-15 RX ORDER — TALC
6 POWDER (GRAM) TOPICAL NIGHTLY PRN
Status: DISCONTINUED | OUTPATIENT
Start: 2023-05-15 | End: 2023-05-21 | Stop reason: HOSPADM

## 2023-05-15 RX ORDER — LORAZEPAM 2 MG/ML
2 INJECTION INTRAMUSCULAR ONCE AS NEEDED
Status: COMPLETED | OUTPATIENT
Start: 2023-05-15 | End: 2023-05-15

## 2023-05-15 RX ORDER — DIAZEPAM 5 MG/1
10 TABLET ORAL EVERY 12 HOURS
Status: DISCONTINUED | OUTPATIENT
Start: 2023-05-18 | End: 2023-05-16

## 2023-05-15 RX ORDER — IBUPROFEN 200 MG
16 TABLET ORAL
Status: DISCONTINUED | OUTPATIENT
Start: 2023-05-15 | End: 2023-05-15

## 2023-05-15 RX ORDER — TALC
6 POWDER (GRAM) TOPICAL NIGHTLY PRN
Status: DISCONTINUED | OUTPATIENT
Start: 2023-05-15 | End: 2023-05-15

## 2023-05-15 RX ORDER — HEPARIN SODIUM 5000 [USP'U]/ML
5000 INJECTION, SOLUTION INTRAVENOUS; SUBCUTANEOUS EVERY 8 HOURS
Status: DISCONTINUED | OUTPATIENT
Start: 2023-05-15 | End: 2023-05-21 | Stop reason: HOSPADM

## 2023-05-15 RX ORDER — IBUPROFEN 200 MG
24 TABLET ORAL
Status: DISCONTINUED | OUTPATIENT
Start: 2023-05-15 | End: 2023-05-15

## 2023-05-15 RX ORDER — DIAZEPAM 5 MG/1
10 TABLET ORAL EVERY 8 HOURS
Status: DISCONTINUED | OUTPATIENT
Start: 2023-05-16 | End: 2023-05-15

## 2023-05-15 RX ORDER — GLUCAGON 1 MG
1 KIT INJECTION
Status: DISCONTINUED | OUTPATIENT
Start: 2023-05-15 | End: 2023-05-21 | Stop reason: HOSPADM

## 2023-05-15 RX ORDER — DIAZEPAM 10 MG/2ML
5 INJECTION INTRAMUSCULAR EVERY 4 HOURS PRN
Status: DISCONTINUED | OUTPATIENT
Start: 2023-05-15 | End: 2023-05-15

## 2023-05-15 RX ORDER — DIAZEPAM 5 MG/1
10 TABLET ORAL ONCE
Status: DISCONTINUED | OUTPATIENT
Start: 2023-05-18 | End: 2023-05-15

## 2023-05-15 RX ORDER — DIAZEPAM 5 MG/1
10 TABLET ORAL ONCE
Status: DISCONTINUED | OUTPATIENT
Start: 2023-05-19 | End: 2023-05-16

## 2023-05-15 RX ORDER — DIAZEPAM 5 MG/1
10 TABLET ORAL EVERY 6 HOURS
Status: DISCONTINUED | OUTPATIENT
Start: 2023-05-16 | End: 2023-05-16

## 2023-05-15 RX ORDER — DIAZEPAM 10 MG/2ML
5 INJECTION INTRAMUSCULAR ONCE
Status: COMPLETED | OUTPATIENT
Start: 2023-05-15 | End: 2023-05-15

## 2023-05-15 RX ORDER — LEVOTHYROXINE SODIUM 50 UG/1
50 TABLET ORAL
Status: DISCONTINUED | OUTPATIENT
Start: 2023-05-16 | End: 2023-05-21 | Stop reason: HOSPADM

## 2023-05-15 RX ORDER — ONDANSETRON 2 MG/ML
4 INJECTION INTRAMUSCULAR; INTRAVENOUS EVERY 8 HOURS PRN
Status: DISCONTINUED | OUTPATIENT
Start: 2023-05-15 | End: 2023-05-15

## 2023-05-15 RX ORDER — DEXTROSE 40 %
15 GEL (GRAM) ORAL
Status: DISCONTINUED | OUTPATIENT
Start: 2023-05-15 | End: 2023-05-21 | Stop reason: HOSPADM

## 2023-05-15 RX ORDER — THIAMINE HCL 100 MG
100 TABLET ORAL DAILY
Status: DISCONTINUED | OUTPATIENT
Start: 2023-05-15 | End: 2023-05-21 | Stop reason: HOSPADM

## 2023-05-15 RX ORDER — PROCHLORPERAZINE EDISYLATE 5 MG/ML
2.5 INJECTION INTRAMUSCULAR; INTRAVENOUS EVERY 6 HOURS PRN
Status: DISCONTINUED | OUTPATIENT
Start: 2023-05-15 | End: 2023-05-21 | Stop reason: HOSPADM

## 2023-05-15 RX ORDER — DIAZEPAM 5 MG/1
10 TABLET ORAL EVERY 6 HOURS PRN
Status: DISCONTINUED | OUTPATIENT
Start: 2023-05-15 | End: 2023-05-15

## 2023-05-15 RX ORDER — INSULIN ASPART 100 [IU]/ML
0-5 INJECTION, SOLUTION INTRAVENOUS; SUBCUTANEOUS
Status: DISCONTINUED | OUTPATIENT
Start: 2023-05-15 | End: 2023-05-21 | Stop reason: HOSPADM

## 2023-05-15 RX ORDER — ACETAMINOPHEN 325 MG/1
650 TABLET ORAL EVERY 8 HOURS PRN
Status: DISCONTINUED | OUTPATIENT
Start: 2023-05-15 | End: 2023-05-21 | Stop reason: HOSPADM

## 2023-05-15 RX ORDER — DEXTROSE 40 %
30 GEL (GRAM) ORAL
Status: DISCONTINUED | OUTPATIENT
Start: 2023-05-15 | End: 2023-05-21 | Stop reason: HOSPADM

## 2023-05-15 RX ORDER — FOLIC ACID 1 MG/1
1 TABLET ORAL DAILY
Status: DISCONTINUED | OUTPATIENT
Start: 2023-05-15 | End: 2023-05-21 | Stop reason: HOSPADM

## 2023-05-15 RX ORDER — NALOXONE HCL 0.4 MG/ML
0.02 VIAL (ML) INJECTION
Status: DISCONTINUED | OUTPATIENT
Start: 2023-05-15 | End: 2023-05-21 | Stop reason: HOSPADM

## 2023-05-15 RX ORDER — ONDANSETRON 2 MG/ML
4 INJECTION INTRAMUSCULAR; INTRAVENOUS EVERY 6 HOURS PRN
Status: DISCONTINUED | OUTPATIENT
Start: 2023-05-15 | End: 2023-05-21 | Stop reason: HOSPADM

## 2023-05-15 RX ORDER — DIAZEPAM 5 MG/1
10 TABLET ORAL EVERY 8 HOURS
Status: DISCONTINUED | OUTPATIENT
Start: 2023-05-17 | End: 2023-05-16

## 2023-05-15 RX ORDER — MAGNESIUM SULFATE HEPTAHYDRATE 40 MG/ML
2 INJECTION, SOLUTION INTRAVENOUS
Status: COMPLETED | OUTPATIENT
Start: 2023-05-15 | End: 2023-05-15

## 2023-05-15 RX ORDER — SODIUM CHLORIDE 0.9 % (FLUSH) 0.9 %
10 SYRINGE (ML) INJECTION
Status: DISCONTINUED | OUTPATIENT
Start: 2023-05-15 | End: 2023-05-15

## 2023-05-15 RX ORDER — SODIUM CHLORIDE, SODIUM LACTATE, POTASSIUM CHLORIDE, CALCIUM CHLORIDE 600; 310; 30; 20 MG/100ML; MG/100ML; MG/100ML; MG/100ML
INJECTION, SOLUTION INTRAVENOUS CONTINUOUS
Status: DISCONTINUED | OUTPATIENT
Start: 2023-05-15 | End: 2023-05-17

## 2023-05-15 RX ORDER — SODIUM CHLORIDE 0.9 % (FLUSH) 0.9 %
10 SYRINGE (ML) INJECTION EVERY 12 HOURS PRN
Status: DISCONTINUED | OUTPATIENT
Start: 2023-05-15 | End: 2023-05-21 | Stop reason: HOSPADM

## 2023-05-15 RX ORDER — DIAZEPAM 5 MG/1
10 TABLET ORAL EVERY 12 HOURS
Status: DISCONTINUED | OUTPATIENT
Start: 2023-05-17 | End: 2023-05-15

## 2023-05-15 RX ORDER — DIAZEPAM 5 MG/1
10 TABLET ORAL EVERY 6 HOURS
Status: DISCONTINUED | OUTPATIENT
Start: 2023-05-15 | End: 2023-05-15

## 2023-05-15 RX ADMIN — ONDANSETRON 4 MG: 2 INJECTION INTRAMUSCULAR; INTRAVENOUS at 10:05

## 2023-05-15 RX ADMIN — DIAZEPAM 5 MG: 5 INJECTION, SOLUTION INTRAMUSCULAR; INTRAVENOUS at 04:05

## 2023-05-15 RX ADMIN — DIAZEPAM 5 MG: 5 INJECTION, SOLUTION INTRAMUSCULAR; INTRAVENOUS at 01:05

## 2023-05-15 RX ADMIN — ONDANSETRON 4 MG: 2 INJECTION INTRAMUSCULAR; INTRAVENOUS at 02:05

## 2023-05-15 RX ADMIN — SODIUM CHLORIDE, POTASSIUM CHLORIDE, SODIUM LACTATE AND CALCIUM CHLORIDE 1000 ML: 600; 310; 30; 20 INJECTION, SOLUTION INTRAVENOUS at 11:05

## 2023-05-15 RX ADMIN — SODIUM CHLORIDE, POTASSIUM CHLORIDE, SODIUM LACTATE AND CALCIUM CHLORIDE: 600; 310; 30; 20 INJECTION, SOLUTION INTRAVENOUS at 03:05

## 2023-05-15 RX ADMIN — LORAZEPAM 2 MG: 2 INJECTION INTRAMUSCULAR; INTRAVENOUS at 10:05

## 2023-05-15 RX ADMIN — THIAMINE HYDROCHLORIDE 100 MG: 100 INJECTION, SOLUTION INTRAMUSCULAR; INTRAVENOUS at 08:05

## 2023-05-15 RX ADMIN — SODIUM CHLORIDE, POTASSIUM CHLORIDE, SODIUM LACTATE AND CALCIUM CHLORIDE: 600; 310; 30; 20 INJECTION, SOLUTION INTRAVENOUS at 10:05

## 2023-05-15 RX ADMIN — DEXTROSE 125 ML: 10 SOLUTION INTRAVENOUS at 07:05

## 2023-05-15 RX ADMIN — MAGNESIUM SULFATE 2 G: 2 INJECTION INTRAVENOUS at 12:05

## 2023-05-15 RX ADMIN — HEPARIN SODIUM 5000 UNITS: 5000 INJECTION INTRAVENOUS; SUBCUTANEOUS at 10:05

## 2023-05-15 NOTE — ASSESSMENT & PLAN NOTE
Patient with acute kidney injury likely due to IVVD/dehydration DEVANTE is currently stable. Labs reviewed- Renal function/electrolytes with Estimated Creatinine Clearance: 27.7 mL/min (A) (based on SCr of 2.2 mg/dL (H)). according to latest data. Monitor urine output and serial BMP and adjust therapy as needed. Avoid nephrotoxins and renally dose meds for GFR listed above.     - Creatinine 2.2 on admit, baseline around 0.6    Plan:   Lab Results   Component Value Date    CREATININE 2.2 (H) 05/15/2023     - Check urine lytes and UPCR  - IVF  - Avoid nephrotoxic agents such as NSAIDs, gadolinium and IV radiocontrast.  - Renally dose meds to current GFR.  - Maintain MAP > 65.

## 2023-05-15 NOTE — ASSESSMENT & PLAN NOTE
T1b N0 stage IA breast cancer diagnosed October 2020. S/p bilateral mastectomy with no adjuvant therapy; received Letrozole February 2021-may 2021

## 2023-05-15 NOTE — DISCHARGE INSTRUCTIONS
REFERRAL RECOMMENDATIONS FOR SUBSTANCE ABUSE & MENTAL HEALTH      IN CASE OF SUICIDAL THINKING, call the National Suicide Hotline Number: 988    988 Suicide & Crisis Lifeline: 985 , 5-639-713-UJFB (8417)  https://988Architonic.SocialGO       SUBSTANCE ABUSE:     [facility] (formerly known as the ABU)  [x] [phone], Option 2  [x] [address] [facility] 4th [facility], [place] [address]  [x] [email or web address]/services/[email or web address]-program  [x] The [facility] delivers comprehensive and collaborative treatment for alcohol and substance use disorders.  Excellent program for working professionals or anyone else seeking recovery.  [x] Requires insurance approval prior to starting program, call number above for more information.  [x] Intensive Outpatient Rehabilitation Program - M-F 9am-3pm - daily groups with psychologists and social workers, sessions with MDs 3x per week   [x] Ambulatory detox and dual diagnosis available      SUBOXONE:     NOTE: some Suboxone clinics require their clients to participate in a structured program (such as an IOP) in order to be prescribed Suboxone.  Some clinics have a long waiting list.  Most of these clinics do not accept walk-in clients, so call first to to learn what must be done to get started on Suboxone.    [facility] - [phone] (can do Sublocade)  [address] [facility], [place] [address]    [address]  [phone]    [facility] - [phone] (can do Sublocade)  [address] [place] [address]    [facility]  [address] [place]  [phone]     [facility] (very short waiting list, may accept some walk-in's but call first if possible)  [address], [place] [address]  [phone]; [phone]    [facility]   [address] [place]    [phone]    [facility] (can usually be seen within a week but is cash only  for appointment)  [address] [place], [place]    [facility] ([facility])  [address] [facility], [address] [place] [id number] [phone]    [facility] ([facility])  [address], [place]  [phone]    [facility] ([facility])  [address] [facility] [address]  [phone]    [place], [place]:    [facility] - [address] [place] [address] - Tel: [phone]    Alejandro Mcduffie - [address] [place] [address] - Tel: [phone]    Salvador Galan - [address] [place] [address] - Tel: [phone]    Luis Moody - [address] [place] [address] - Tel: [phone]    Juan Luis Ramos - [address] [place] [address] - Tel: [phone]    [place], [place]:     Dr. Luis Pringle and Dr. Eugene Dennis - [address] [place], [place] - Tel: [phone]    Dr. Radha Kirby - [address] [place], [place] - Tel: [phone]    Dr. Ayush Hull - Tel: [phone]    Dr. Herber Gomez - [facility] - [phone]      METHADONE:     [facility] (the only methadone clinic in the [facility], has two locations)  [x] [place] - [facility] [place] [address], [phone]  [x] [facility] [place] [address], [phone]    12 STEP PROGRAMS (and similar):     Alcoholics Anonymous (local)  [x] [phone]  [x] www.[place].org for schedules for in-person and online meetings  [x] There are AA meetings throughout the day all over town  [x] AA costs nothing to attend; they pass a basket for donations but this is not required    Narcotics Anonymous  [x] [phone]  [x] www.noana.org  [x] There are NA meetings throughout the day all over town  [x] NA costs nothing to attend; they pass a basket for donations but this is not required    Alcoholics Anonymous Online Intergroup (national)  [x] www.aa-intergroup.org  [x] Good resource for large, nation-wide meetings  [x] Can also attend smaller, local meetings in other cities  [x] Countless meetings  all day and all night  [x] AA costs nothing to attend; they pass a basket for donations but this is not required    Flying Sober - 24/7 zoom meetings for women and coed - sign on anytime, anywhere!  [email or web address]    Online Intergroup of AA - 121 Open AA [place] Meeting - 24/7 zoom meetings  https://aa-intergroup.org/meetings/    LOOKING FOR AN ALTERNATIVE TO 12 STEP PROGRAMS - check out:  SMART Recovery: https://www.smartrecovery.org/about-us  [facility] Recovery: https://recoverydharma.org      DETOX UNITS (USUALLY 5-7 DAYS):     [facility]: [address], [place]  [phone], call first to ensure bed availability    [facility]: [address] [place]  [phone], Option 1, call first to ensure bed availability    [facility]: [facility] [address], [facility]  [phone] (intake by appointment only)    [facility]: [address], [place]  [phone]      INTENSIVE OUTPATIENT PROGRAMS:     [facility] (formerly known as the ABU)  [x] [phone], Option 2  [x] [address] [facility] 4th [facility], [facility] [address]  [x] https://www.[facility].org/services/[email or web address]-recovery-program  [x] [facility] delivers comprehensive and collaborative treatment for alcohol and substance use disorders.  Excellent program for working professionals or anyone else seeking recovery.  [x] Requires insurance approval prior to starting program, call number above for more information.  [x] Intensive Outpatient Rehabilitation Program - M-F 9am-3pm - daily groups with psychologists and social workers, sessions with MDs 3x per week   [x] Ambulatory detox and dual diagnosis available    [facility] Int[facility]patient [facility]  [x] [phone]  [x] [address] [facility] (the clinic not on [facility] main [facility])  [x] Call number above for more info and to check insurance requirements    [address] [place] [address]  [phone]    [facility]:  [address] [facility], [address]?, [place], [place] [address]?, [phone]  [address] [facility]?, [place], [place] [address]?, [phone]    RESIDENTIAL REHABS (USUALLY 28 DAYS):     [facility]: [address], [phone]    [place] [facility]: [address] [place] [address]  [phone] (intake by appointment only)    [facility] (men only) [address], [phone]    [facility] (Female only) [address], [phone]    [facility]: [address], [place], men's program [phone], women's program [phone]    [facility]: [address] [place], [phone]    [facility]: [address], [place], [place], [phone]    [facility]: Men only, [phone], [address] [facility] Otero [facility]: [address], [place], [place], [phone]    [facility]: [address] [place] [address],  [phone]  New Location: [address] [place] [address] [address], [place] [address], [phone]    [facility]:   ?[address]?[place], [place] [address]?[phone]    [place]: [address], [place] [address], [phone]    [place]: [place], [phone]     [facility]: [place], [place], [phone]    [facility]: RADHA [place], [phone]    [facility]: [place], [place], [phone]    [place]: RAD[address] Floyd, [phone]    [facility]: [address], [place] [address], [phone]    COMMUNITY ADDICTION CLINICS:     [facility]: [address] [facility], [address] [place] -[phone] -or- [address] [place] [address]    [facility] [place]: [address] [facility], [place]  [address]     [facility]: Clinics [phone]; Crisis [phone]    [facility]: [address] [place] [address]    [facility]: [address] [place] [address]    [facility]: [address] [place] [address],    [facility]: 2nd Floor [address] [facility] [address]    [facility]: [address]ás[address], [place] [address]    [facility], [address] [place] [address]    [facility]: [address] [facility], [place] [phone]  (serves youth 16-23 years old)    [facility]: [facility] [phone]    [facility]: [address] [facility], [place] [phone]    [facility]: [address] [facility] [phone]    [facility] Behavioral [facility]: [address] [facility] [facility], [address], [phone]     [facility]: [address] [facility], [phone], [phone]    RESOURCES IN OTHER [facility]:     [facility]: [address] FT[address] [facility], [phone], [phone]    [facility]: [address] [facility], [address], [phone]    [facility], [address] [place] [address], [phone]    [facility]: [address] [facility], [phone]    [facility], [address] [facility], [phone] ([facility])    [facility], [address] [facility], [phone] ([facility])    [facility], [address], [place], [phone] ([facility], and [facility])    [facility], [address] [place], [phone] ([facility])    [facility] Hotline [phone], [phone]    [facility], [address] [facility], [facility], [address] [place] [address]  "Blvd[address], [facility], [address] [facility] [address] [facility] [address], [facility], [address] [facility] [address]., [place] [facility], [address], [place]    [facility], [address], [place] [phone]    [facility], [address] [facility] [phone]    [facility], [address] [facility] [phone]    [facility], [address] [facility], [phone]      Bhutanese SPEAKING (en español):     Información de la reunión de Alcohólicos Anónimos  [facility]on [facility], 10:00 am  Habla [facility]  Esta reunión está abierta y cualquiera puede asistir.    Azerbaijani speaking Alcoholics anonymous meetings:  El "Cameron [place] AA Skype" es un cameron on line de Alcohólicos Anónimos en [facility]. El cameron es mily, gratuito y virtual a través de Skype Audio. El cameron funciona mediante beth llamada grupal de voz, por lo que no se utiliza la videollamada, ni se pueden waqas las imágenes o rostros de los participantes. Hace reji años y medio abrimos el primer Cameron de AA por Skype en [facility], amaya actualmente asisten personas desde [place], [place], [place], [place], [place], [place], [place], [place], [place], [place], [place] y [place], entre otros.    El cameron es muy útil para los alcohólicos que residen en lugares donde no se celebran reuniones de AA, o residen en lugares donde las reuniones de AA son un número limitado de días a la semana, o para aquellos compañeros que se hayan de viaje o que, por cualquier motivo, se hayan convalecientes y no pueden desplazarse. Todos los días nos reuniones a las 21:00 (hora española)    Podéis obtener más información sobre el cameron y george sesiones en la página web [email or web address]      MENTAL HEALTH:     [facility]  Department of Psychiatry - " Outpatient Clinic  [phone]    [facility] of Psychiatry - General Psychiatry Intensive Outpatient Program  [facility] (formerly known as the BMU)  [phone], option 3    [facility] Psych[facility] - Dual Diagnosis Intensive Outpatient Program  [facility] (formerly known as the ABU)  [phone], option 2      [facility] MENTAL HEALTH CENTERS:     [facility]  (aka [facility], [facility])  Serves [place].  Serves uninsured patients & those with Medicaid.  Main location: [address] [place] [id number] [phone]  Walk-in's available during regular business hours.  24/7 Crisis Line: [phone]    [facility]  (aka [facility])  Serves [facility].  Serves uninsured patients, those with Medicaid and some private plans.  Walk-in's available during regular business hours.  Primary care services available as well.  [facility]: [address] [facility] [place] [address];  [phone]  [place]: [address] [place] [address];  [phone]  24/7 Crisis Line: [phone]    [facility]  Serves uninsured patients & those with Medicaid, call for more info.  Primary care, pediatrics, HIV treatment, and dentistry services available as well.  Three locations.  [phone]    [facility] [place]Corporate Office  Serves patients with Medicaid, Medicare, and private insurance  [address]  [place],?[place] [address]  [phone]    [facility]  Serves uninsured on a sliding scale, as well as Medicaid, Medicare, and private plans.  Eight locations around the [facility] area.  [phone]    [facility]  Serves uninsured patients & those with Medicaid, private insurances.  Primary care  available as well.  [phone]  [address] [place] [address]    [facility] Outpatient Psychiatry  Serves veterans who were honorably discharged.  [address] [place] [address]  [phone]  24/7 Veterans Crisis Line: [phone] (Press 1)    If you have private insurance and need to find a specialist, please contact your insurance network to request a list of providers covered by your benefits.      MENTAL HEALTH/ADDICTIVE DISORDERS:     AA ([phone]), NA ([phone])   National Suicide Prevention Lifeline- Call 1-775.247.4579 Available 24 hours everyday  [facility] [phone]; Crisis Line [phone] - Call for options A-F:  Intensive Outpatient Treatment/ Day programs   [facility], please contact   [facility], please contact [phone] or [phone] to speak with an admissions counselor.  [facility] (Methadone Maintenance)   [address] [place] [address], [phone]  [address] [place] [address] [phone]  [facility], [address] [place] [address], [phone]  [facility] [phone]  [place] [place] please contact [phone]  [facility], [address] [facility], 4th floor [place] [address] Phone: [phone]   [place]agan Patel Office: [address] [address], [phone]  [place] Office: [address] [facility], [address], [place] [address], [phone]  [place] Office: [address] [place] [address] [phone]    Outpatient Substance Abuse Treatment   [facility] (Methadone Maintenance)   [address] [place] [address], [phone]  [address] [place] [address] [phone]  [facility], [address] [place] [address], [phone]  [facility]  [place] Office: [address], [place] GARC[place] [address], [phone]  [place]  Office: [address] [facility], [address], [place] [address], [phone]  [place] Office: [address] [facility], [place] [address] [phone]  [place] [facility], [address] [place] [address] [phone]   [facility], [address] [facility], [address], [phone]  [facility]over, [address] [place], [place] [phone]    Residential Substance Abuse Treatment   [facility] [address] [facility], [phone]  [facility], [address] [facility], [phone]  [facility] (men only) [address] [place] [address], [phone]  Women at the [facility] (women only) [address] [place] [address] [phone]  [facility], [address] [place] [address] [phone]  [facility] (women only), intakes at 4[address] [facility], [phone]  [facility] (7-day program, $100, [address] [place], [phone], [phone], [phone])  [place] (Men only, [phone]), [address], [place] (Vets*/Non-Vets)  [facility] (Men only, $400/month program fee) [address] [facility], [phone]  [place] [facility] (Women over age 39 only), 2407 [facility], [phone]    Out of Area:    [place], [address] [facility] [address], [place], [place] ([phone])  [facility] Recovery Program (men only), [address] [facility] [place] [address], [phone]  [facility], [address] [place], [place] ([phone])  [place], [address] [place] [address] Dr. Flores, [place] ([phone])  [facility], [address] [place] [address], [phone]  [facility] (Women only, has to have mental illness, can be homeless or substance abuser), [phone]        DOMESTIC VIOLENCE RESOURCES:     Advocacy  [facility] (NOF[facility])  [address] [address]    [facility] ? [phone]  Services  provided: emergency shelter, individual advocacy, information and referrals, group support, children's program, medical advocacy, forensic medical exams, primary care, legal assistance, counseling, safety planning, and caregiver support    [facility] HEALING AND EMPOWERMENT [place]  Confidential location  [facility] ? [phone]  Services provided: short term emergency shelter, all services provided are free of charge    [facility] FOR COMMUNITY ADVOCACY  Multiple locations in [facility], [facility], [place], and [facility] ([place], [place], and [place])    [facility] ? [phone]  Services provided: emergency shelter, individual advocacy, information and referrals, group support, children's program, medical advocacy, legal assistance in obtaining restraining orders, counseling, safety planning, and caregiver support    [facility]   Emergency Shelter   [phone]  Emergency Services ,Legal and Financial Assistance Services ,Housing Services ,Support Services     [facility]   [phone]  Emergency Services ,Counseling Services , Housing Services ,Support Services ,Children's Services     WOMEN WITH A VISION  [address] [place] [address]  WWAV ? [phone]  Services provided: advocacy, health education and supportive services, specializing in free healing services for marginalized groups, including LGBTQ individuals and sex workers    SEXUAL TRAUMA AWARENESS AND RESPONSE (STAR)  [address] [place] [address]    STAR ? [phone]  Services provided: individual advocacy, information and referrals, group support, medical advocacy, legal assistance, counseling, and safety planning for survivors of sexual assault    [facility] ([facility])  [address] [place] [address]  [facility] ? [phone]  Services provided: free forensic medical exams for sexual assault and  domestic violence, which can be performed up to 5 days after an incident. It is not necessary to make a police report to receive a forensic medical exam    Legal  [facility]  [address], [place] [address]  [facility] ? [phone]  Services provided: free emergency legal representation for survivors of doemstic violence residing in [facility]. Legal services may include temporary restraining orders, temporary child support, custody, and use of property    [facility] LEGAL SERVICES (SLLS)  [address], [place] [address]  SLLS ? [phone]  Services provided: free legal representation for survivors of domestic violence residing in [facility]. Legal services may include temporary child support, custody, and divorce      HOTLINES:     [facility] DOMESTIC VIOLENCE HOTLINE  [phone]    Services provided: free and confidential hotline for victims and survivors of domestic violence. All calls will be routed to a domestic violence service provided in the victim or survivor's area    NATIONAL HUMAN TRAFFICKING HOTLINE  (921) 770-7775    Services provided: national anti-trafficking hotline serving victims and survivors of human trafficking. Provides information about local resources, and access to safe space to report tips, seek services, and ask for help    VIA LINK  211 or [phone]) [phone]    Service provided: counselors can provide crisis counseling. Counselors can also provide information and referrals to programs which can help with needs such as food, shelter, medical care, financial assistance, mental health services, substance abuse treatment, senior services, childcare, and more      HOMELESS SHELTERS:      Homeless shelters  [facility]  Emergency shelter for individuals and families  [address]  [phone]  [facility]  Emergency shelter for men only  Meals daily 6am, 2pm, & 6pm  Clothing, case management M-F by appointment  (ID/job/housing/legal assistance), mail  [address] [facility]  [phone]  [place]  Emergency shelter for men  [address] [facility]  [phone]  Emergency shelter for women  [address] [facility]  [phone]  Breakfast & lunch daily, dinner M-F  Case management, job counseling services   [facility]  Emergency shelter for teens and young adults up to 20yo  [address]  [phone]  [place] [facility]  Emergency shelter for women over 15yo and their kids  [address] [place] [address]  [phone]  [facility]  Day program, meals M-F 1PM (arrive early)  Showers, laundry, hygiene kits, showers, phones, , notary services, case management, ID assistance  [address] [facility]  [phone] M-F 8am-2:30pm  [employer]  Day program  [facility] 7:30am-3:30pm,  8:30am-3:30pm  Crisis intervention, employment assistance, food/clothing, hygiene kits, bus tokens, mail  [address] [facility] [address]  [phone]  [facility]  Mobile outreach for homeless persons in [facility]  [phone]  [facility]  Primary healthcare, case management, dental services, TB placement  Call ahead  [address] [facility] 2nd [address]  [phone]  [place] [facility]  Connects homeless people with their loved ones in other cities by providing transportation costs   [phone]      [place] RESOURCES:     [place] [facility] Mental Health Crisis Response Team:    Region 12 ([place], [place], [place], and [facility]) ([facility] and [facility])  [phone]      Outpatient Mental Health & Addiction Clinic Resources for both [facility] and [facility]:    [facility] Resources  Website: www.pbmhr.org  Main Number: 416-855-0032    [facility] ([facility])  [address] ([address] [facility]) [place] [address]  [phone]    [facility] ([place]  Area)  [address] ([address] [facility]) [place] [address]  [phone]    [facility]  [address] ([address]) [place] [address]  [phone]    [facility]  [address] ([address] [facility]) [place] [address]  [phone]      Addiction Treatment Resources for both [facility] and [facility] [place]:    [facility] (Detox, Residential, PHP, IOP, and Aftercare Programs)  [address] [place] [address]  [phone]    [facility] (Residential, IOP, Transitional Living, and Aftercare Programs)  [address] [place] [address]  [phone]    [facility] (Inpatient, Residential, Detox, IOP, Outpatient, and Aftercare Programs)  [address] [place] [address]  [phone] or toll free at [phone]      Outpatient Mental Health Psychotherapy Resources for both [facility] and [place]:    Michell Freire, [facility]  [address]  [place] [address]  [phone]  Specialties: Depression, Anxiety, and Life Transitions    Debra Rosas, PhD  [address]  [place] [address]  [phone]  Specialties: Testing and Evaluation, Education and Learning Disabilities, and ADHD    Loly Hines, [facility] [address] [place] [address]  [phone]  Specialties: Obsessive-Compulsive (OCD), Depression, and Relationship Issues    Marybel Escalante LPC [address] [place] [address]  [phone]  Specialties: Trauma & PTSD, Mood Disorders, and Anxiety    Marybel Walker, PhD, [facility] [address] [place] [address]  [phone]  Specialties: Family Conflict, Child, and Relationship Issues    Molly Macedo LPC [facility] [place] [address] [phone]  Specialties: Anxiety, Depression, and  Anger Management        IN CASE OF SUICIDAL THINKING, call the National Suicide Hotline Number: 988    988 Suicide & Crisis Lifeline: 988 , 6-9804-090-181-TALK (5898)  Provides 24/7, free and confidential support for people in distress, prevention and crisis resources for you or your loved ones, and best practices for professionals.    Call, text or chat.  https://988MySiteApp.org

## 2023-05-15 NOTE — PHARMACY MED REC
"Admission Medication History     The home medication history was taken by Zahira Gibbs.    You may go to "Admission" then "Reconcile Home Medications" tabs to review and/or act upon these items.     The home medication list has been updated by the Pharmacy department.   Please read ALL comments highlighted in yellow.   Please address this information as you see fit.    Feel free to contact us if you have any questions or require assistance.      The medications listed below were removed from the home medication list. Please reorder if appropriate:  Patient reports no longer taking the following medication(s):  VITAMIN D3  DICYCLOMINE 20 MG  ISOSORBIDE MONONITRATE 30 MG  METHOCARBAMOL 750 MG  MULTIVITAMIN    Medications listed below were obtained from: Patient/family and Medications brought from home    Current Outpatient Medications on File Prior to Encounter   Medication Sig    acetaminophen (TYLENOL) 500 MG tablet Take 500-1,500 mg by mouth daily as needed for Pain.    DULoxetine (CYMBALTA) 30 MG capsule Take 1 capsule (30 mg total) by mouth once daily. Take with 60mg =90mg QD    DULoxetine (CYMBALTA) 60 MG capsule Take 60 mg by mouth once daily. Take along with 30mg to total 90mg    folic acid (FOLVITE) 1 MG tablet Take 1 tablet (1 mg total) by mouth once daily.    levothyroxine (SYNTHROID) 50 MCG tablet Take 1 tablet (50 mcg total) by mouth before breakfast.    lisinopriL (PRINIVIL,ZESTRIL) 20 MG tablet Take 1 tablet (20 mg total) by mouth once daily.    loperamide (IMODIUM) 2 mg capsule Take 2 mg by mouth 4 (four) times daily as needed for Diarrhea (Per package directions).    melatonin (MELATIN) Take by mouth nightly as needed for Insomnia.    metFORMIN (GLUCOPHAGE-XR) 500 MG ER 24hr tablet Take 2 tablets (1,000 mg total) by mouth 2 (two) times daily with meals.    ondansetron (ZOFRAN ODT) 4 MG TbDL Take 1 tablet (4 mg total) by mouth every 6 (six) hours as needed (nausea).    thiamine 100 MG tablet Take 1 " tablet (100 mg total) by mouth once daily.    traZODone (DESYREL) 300 MG tablet Take 1 tablet (300 mg total) by mouth every evening.    fluticasone furoate-vilanteroL (BREO ELLIPTA) 100-25 mcg/dose diskus inhaler Inhale 1 puff into the lungs once daily. Controller (Patient not taking: Reported on 5/15/2023)       Potential issues to be addressed PRIOR TO DISCHARGE    Please discuss with the patient barriers to adherence with medication treatment plans    Patient requires education regarding drug therapies         Zahira Gibbs  EXT [id number]                  .

## 2023-05-15 NOTE — ASSESSMENT & PLAN NOTE
General weight loss/lifestyle modification strategies discussed (elicit support from others; identify saboteurs; non-food rewards, etc).

## 2023-05-15 NOTE — H&P
[facility] - [facility] Dept  Hospital Medicine  History & Physical    Patient Name: Earl Abdul  MRN: [id number]  Patient Class: IP- Inpatient  Admission Date: 5/15/2023  Attending Physician: Rashard Yap MD   Primary Care Provider: Andrew Rodriguez MD      Patient information was obtained from patient, past medical records and ER records.     Subjective:     Principal Problem:Alcoholic ketoacidosis    Chief Complaint:   Chief Complaint   Patient presents with    Weakness     Hx of alcoholism, has been drinking heavily for 2 weeks. Told  today she wanted to detox, on EMS arrival pt was vomiting, weak and had a sugar of 55. Received 15g sugar from EMS, sugar is 53 now. Tremor in right hand and left foot, she states is new        HPI: 62 year old female with medical history significant for  EtOH use disorder with history of prior seizures, depression with suicide attempt in 2017, breast cancer, HTN, HLD, fibromyalgia, sarcoidosis, hypothyroidism, T2DM, CLL (not on treatment) and COPD presenting with complaints of nausea and vomiting x 2 days associated with generalized weakness. She reports the last time she had a meal was 1 week ago but she has been drinking EtOH very heavily. She reports drinking 0.5 bottles of vodka daily and her last drink was this morning. Per EMS, her BG was reportedly 55 prior to arrival. She states that she has had diarrhea as well. She has had alcohol withdrawals including seizures before. She was recently admitted 4/25-4/20 for the same presentation of nausea and vomiting in setting of 2 weeks of heavy alcohol use. She was initaited on CIWA and treated with valium taper, no seizures during that admission. 3 weeks prior to that she was admitted for acute on chronic respiratory failure due to COPD with non-adherance to home inhalers. She also had a mechanical fall 2 days ago in which she hurt her eye lid. Denies fevers, chest pain, hematemesis or bleeding per rectum.     On  arrival to [facility], she is AF, , /96, on 4L NC. WBC 20.11 (baseline tends to fluctuate between leukopenia and leukocytosis). Hgb 11.1, Plt 149, both improved from baseline. Na 131, K 5.1, Cl 90, HCO3 15, BUN 34, Cr 2.2 (bl 0.7). Glucose 46 on arrival, which improved to 234 with administration of D10. Lactate 2.5. CXR without acute cardiopulmonary process.       Past Medical History:   Diagnosis Date    Anemia     Anemia     Controlled type 2 diabetes mellitus without complication, without long-term current use of insulin 11/30/2021    COPD (chronic obstructive pulmonary disease)     Depression     Diverticulitis     Fatty liver     GERD (gastroesophageal reflux disease)     Hyperlipidemia     Hypertension     Pancreatitis     Peptic ulcer disease     Polysubstance abuse     Posterior reversible encephalopathy syndrome     Sarcoidosis of lung     Sarcoidosis of lung     over 30 yrs ago    Seizures     7/2017    Suicide attempt     Suicide ideation        Past Surgical History:   Procedure Laterality Date    APPENDECTOMY      BILATERAL MASTECTOMY Bilateral 10/29/2020    Procedure: MASTECTOMY, BILATERAL;  Surgeon: Baylee Kevin MD;  Location: [facility] OR [address] [place] [address];  Service: General;  Laterality: Bilateral;    BREAST REVISION SURGERY Bilateral 2/11/2021    Procedure: BREAST REVISION SURGERY;  Surgeon: Scottie Johnson MD;  Location: [facility] OR [address] [place] [address];  Service: Plastics;  Laterality: Bilateral;    COLONOSCOPY N/A 7/28/2017    Procedure: COLONOSCOPY;  Surgeon: Aarno Alvarado MD;  Location: [facility];  Service: Endoscopy;  Laterality: N/A;    ESOPHAGOGASTRODUODENOSCOPY  10/7/2016, 11/6/2014    2016 - gastritis, duodenitis, 2014 erosive gastritis    ESOPHAGOGASTRODUODENOSCOPY N/A 2/11/2020    Procedure: ESOPHAGOGASTRODUODENOSCOPY (EGD);  Surgeon: Fawn Garrido MD;  Location: [facility];  Service: Endoscopy;  Laterality: N/A;    ESOPHAGOGASTRODUODENOSCOPY N/A 4/19/2021    Procedure:  EGD (ESOPHAGOGASTRODUODENOSCOPY);  Surgeon: Paramjit Martino MD;  Location: [facility] ENDO;  Service: Endoscopy;  Laterality: N/A;    FLEXIBLE SIGMOIDOSCOPY  11/06/2014    colitis    HYSTERECTOMY      IMPLANTATION OF PERMANENT SACRAL NERVE STIMULATOR N/A 7/12/2022    Procedure: INSERTION, NEUROSTIMULATOR, PERMANENT, SACRAL;  Surgeon: Juaquin Edwards MD;  Location: [facility] OR [address] [place] [address];  Service: Urology;  Laterality: N/A;  1hr    INJECTION FOR SENTINEL NODE IDENTIFICATION Right 10/29/2020    Procedure: INJECTION, FOR SENTINEL NODE IDENTIFICATION;  Surgeon: Baylee Kevin MD;  Location: [address];  Service: General;  Laterality: Right;    INJECTION OF JOINT Right 10/10/2019    Procedure: Injection, Joint RIGHT ILIOPSOAS BURSA/TENDON INJECTION AND RIGHT GLUTEAL TENDON INJECTION WITH STEROID AND LIDOCAINE;  Surgeon: Guillaume Rico MD;  Location: [facility] PAIN MGT;  Service: Pain Management;  Laterality: Right;  NEEDS CONSENT    INSERTION OF BREAST TISSUE EXPANDER Bilateral 10/29/2020    Procedure: INSERTION, TISSUE EXPANDER, BREAST;  Surgeon: Scottie Johnson MD;  Location: [address];  Service: Plastics;  Laterality: Bilateral;  Right breast: 1082 g  Left breast: 1076 g    LIPOSUCTION Bilateral 2/11/2021    Procedure: LIPOSUCTION;  Surgeon: Scottie Johnson MD;  Location: [address];  Service: Plastics;  Laterality: Bilateral;    mediastenoscopy      REPLACEMENT OF IMPLANT OF BREAST Bilateral 2/11/2021    Procedure: REPLACEMENT, IMPLANT, BREAST;  Surgeon: Scottie Johnson MD;  Location: [address];  Service: Plastics;  Laterality: Bilateral;    SENTINEL LYMPH NODE BIOPSY Right 10/29/2020    Procedure: BIOPSY, LYMPH NODE, SENTINEL;  Surgeon: Baylee Kevin MD;  Location: [address];  Service: General;  Laterality: Right;    TONSILLECTOMY N/A 1970    TUBAL LIGATION         Review of patient's allergies indicates:   Allergen Reactions    Lortab  [hydrocodone-acetaminophen] Itching    Promethazine Itching and Other (See Comments)       Current Facility-Administered Medications on File Prior to Encounter   Medication    albuterol sulfate nebulizer solution 2.5 mg     Current Outpatient Medications on File Prior to Encounter   Medication Sig    acetaminophen (TYLENOL) 500 MG tablet Take 500-1,500 mg by mouth daily as needed for Pain.    ARIPiprazole (ABILIFY) 5 MG Tab Take 1 tablet (5 mg total) by mouth once daily.    cholecalciferol, vitamin D3, 125 mcg (5,000 unit) capsule Take 5,000 Units by mouth.    COMBIVENT RESPIMAT  mcg/actuation inhaler SMARTSI Puff(s) Via Inhaler Every 6 Hours PRN    diazePAM (VALIUM) 5 MG tablet Take 2 tablets (10 mg total) by mouth 2 (two) times a day for 1 day, THEN 1 tablet (5 mg total) 2 (two) times a day for 2 days, THEN 0.5 tablets (2.5 mg total) 2 (two) times a day for 2 days, THEN 0.5 tablets (2.5 mg total) once daily for 2 days.    dicyclomine (BENTYL) 20 mg tablet Take 20 mg by mouth 4 (four) times daily.    DULoxetine (CYMBALTA) 30 MG capsule Take 1 capsule (30 mg total) by mouth once daily. Take with 60mg =90mg QD    DULoxetine (CYMBALTA) 60 MG capsule Take 60 mg by mouth once daily.    fluticasone furoate-vilanteroL (BREO ELLIPTA) 100-25 mcg/dose diskus inhaler Inhale 1 puff into the lungs once daily. Controller    folic acid (FOLVITE) 1 MG tablet Take 1 tablet (1 mg total) by mouth once daily.    isosorbide mononitrate (IMDUR) 30 MG 24 hr tablet Take 1 tablet (30 mg total) by mouth every evening.    levothyroxine (SYNTHROID) 50 MCG tablet Take 1 tablet (50 mcg total) by mouth before breakfast.    lisinopriL (PRINIVIL,ZESTRIL) 20 MG tablet Take 1 tablet (20 mg total) by mouth once daily.    loperamide (IMODIUM) 2 mg capsule Take 2 mg by mouth 4 (four) times daily as needed for Diarrhea (Per package directions).    melatonin (MELATIN) TAKE 1/2 TABLET TO 1 TABLET BY MOUTH EVERY NIGHT AT BEDTIME     metFORMIN (GLUCOPHAGE-XR) 500 MG ER 24hr tablet Take 2 tablets (1,000 mg total) by mouth 2 (two) times daily with meals.    methocarbamoL (ROBAXIN) 750 MG Tab Take 750 mg by mouth 3 (three) times daily as needed (Pain).    multivitamin (THERAGRAN) per tablet Take 1 tablet by mouth once daily.    ondansetron (ZOFRAN ODT) 4 MG TbDL Take 1 tablet (4 mg total) by mouth every 6 (six) hours as needed (nausea).    pantoprazole (PROTONIX) 40 MG tablet Take 1 tablet (40 mg total) by mouth every morning.    propranoloL (INDERAL) 20 MG tablet Take 1 tablet (20 mg total) by mouth 2 (two) times daily.    thiamine 100 MG tablet Take 1 tablet (100 mg total) by mouth once daily.    traZODone (DESYREL) 300 MG tablet Take 1 tablet (300 mg total) by mouth every evening.     Family History       Problem Relation (Age of Onset)    Breast cancer Maternal Aunt, Daughter    Colon cancer Maternal Uncle    Diabetes Father, Mother    Heart attack Father    Hypertension Father, Mother          Tobacco Use    Smoking status: Former     Packs/day: 0.50     Years: 30.00     Pack years: 15.00     Types: Vaping with nicotine, Cigarettes     Quit date: 2021     Years since quittin.2    Smokeless tobacco: Never   Substance and Sexual Activity    Alcohol use: Yes     Comment: vodka daily (half a regular bottle)    Drug use: Yes     Types: Marijuana     Comment: gummies    Sexual activity: Yes     Birth control/protection: Surgical     Review of Systems   Constitutional:  Positive for appetite change and chills. Negative for fatigue and fever.   HENT:  Negative for congestion and sinus pain.    Respiratory:  Negative for cough, shortness of breath and wheezing.    Cardiovascular:  Negative for chest pain, palpitations and leg swelling.   Gastrointestinal:  Positive for nausea and vomiting. Negative for abdominal distention, abdominal pain, blood in stool and diarrhea.   Genitourinary:  Negative for difficulty urinating and  urgency.   Musculoskeletal:  Negative for back pain, myalgias and neck pain.   Skin:  Negative for rash and wound.   Neurological:  Positive for light-headedness. Negative for dizziness, syncope and headaches.   Hematological:  Bruises/bleeds easily.   Psychiatric/Behavioral:  Negative for agitation and behavioral problems. The patient is nervous/anxious.    Objective:     Vital Signs (Most Recent):  Temp: 98.6 °F (37 °C) (05/15/23 1051)  Pulse: 97 (05/15/23 1355)  Resp: (!) 21 (05/15/23 1355)  BP: 118/68 (05/15/23 1358)  SpO2: 96 % (05/15/23 1355) Vital Signs (24h Range):  Temp:  [98.6 °F (37 °C)] 98.6 °F (37 °C)  Pulse:  [] 97  Resp:  [21-27] 21  SpO2:  [92 %-96 %] 96 %  BP: (118-129)/(68-96) 118/68     Weight: 77.1 kg (170 lb)  Body mass index is 26.63 kg/m².     Physical Exam  Vitals and nursing note reviewed.   Constitutional:       Comments: Pt shivering and uncomfortable in bed   HENT:      Head: Contusion and left periorbital erythema present.      Comments: Bruising present around L eye   Eyes:      General: No scleral icterus.  Cardiovascular:      Rate and Rhythm: Regular rhythm. Tachycardia present.   Pulmonary:      Effort: Pulmonary effort is normal. No respiratory distress.      Breath sounds: No wheezing.   Abdominal:      General: Abdomen is flat. Bowel sounds are normal. There is no distension.      Tenderness: There is no abdominal tenderness.   Musculoskeletal:      Cervical back: Normal range of motion. No rigidity.      Right lower leg: No edema.      Left lower leg: No edema.   Skin:     Findings: Bruising present.   Neurological:      Mental Status: She is alert and oriented to person, place, and time.      Comments: No asterixis present   Psychiatric:         Attention and Perception: Attention normal.         Mood and Affect: Mood is anxious and depressed.         Speech: Speech normal.         Thought Content: Thought content normal.         Cognition and Memory: Cognition normal.     "     Judgment: Judgment normal.              Significant Labs: All pertinent labs within the past 24 hours have been reviewed.    Significant Imaging: I have reviewed all pertinent imaging results/findings within the past 24 hours.    Assessment/Plan:     * Alcoholic ketoacidosis  62-year-old female with significant alcohol use history and prior alcohol withdrawal seizures presenting with nausea and vomiting in the setting of heavy alcohol use and noted to have metabolic acidosis on presentation. Mild tremors noted. Given Valium 1x in the ED. Denies hematemesis or melena.    - START Valium taper  - CIWA protocol  - IV Ativan PRN for CIWA > 8  - F/U UDS, serum Ethanol, b-hydroxybutarate, PETH  - Thiamine, multivitamin  - IVF resuscitaion      Alcohol use disorder, severe, dependence  See "Alcoholic Ketoacidosis" Has attempted Rehab in the past. She is concerned her EtOH use is affecting her marriage.     - Addiction Psychiatry consulted; appreciate recommendations    CLL (chronic lymphocytic leukemia)  Established with Dr. Gonzalez. Not currently on treatment. Heme/onc appointment scheduled 5/17    - Outpatient Heme-Onc follow up      Malignant neoplasm of central portion of right breast in female, estrogen receptor positive  T1b N0 stage IA breast cancer diagnosed October 2020. S/p bilateral mastectomy with no adjuvant therapy; received Letrozole February 2021-may 2021        DEVANTE (acute kidney injury)  Patient with acute kidney injury likely due to IVVD/dehydration DEVANTE is currently stable. Labs reviewed- Renal function/electrolytes with Estimated Creatinine Clearance: 27.7 mL/min (A) (based on SCr of 2.2 mg/dL (H)). according to latest data. Monitor urine output and serial BMP and adjust therapy as needed. Avoid nephrotoxins and renally dose meds for GFR listed above.     - Creatinine 2.2 on admit, baseline around 0.6    Plan:   Lab Results   Component Value Date    CREATININE 2.2 (H) 05/15/2023     - Check urine " lytes and UPCR  - IVF  - Avoid nephrotoxic agents such as NSAIDs, gadolinium and IV radiocontrast.  - Renally dose meds to current GFR.  - Maintain MAP > 65.        Essential hypertension  Home regimen: lisinopril 20mg    - HOLD in the setting of current normotension and DEVANTE    Fibromyalgia  - HOLD home cymbalta given DEVANTE      Hypomagnesemia  Mg 1.3 on presentation    - Replete PRN       Hyperlipidemia  Lipid panel 4/6/2023: , HDL 44, ,     - Not currently on treatment         Hypothyroidism  TSH 0.8 12/2022    - Continue home synthroid    Type 2 diabetes mellitus with hyperglycemia  Patient's FSGs are uncontrolled due to hypoglycemia on current medication regimen.  Last A1c reviewed-   Lab Results   Component Value Date    HGBA1C 5.1 03/26/2023     Most recent fingerstick glucose reviewed-   Recent Labs   Lab 05/15/23  1055   POCTGLUCOSE 46*     Current correctional scale  Low  Maintain anti-hyperglycemic dose as follows-   Antihyperglycemics (From admission, onward)    Start     Stop Route Frequency Ordered    05/15/23 1414  insulin aspart U-100 pen 0-5 Units         -- SubQ Before meals & nightly PRN 05/15/23 1321        Hold Oral hypoglycemics while patient is in the hospital. Home regimen: metformin 1000mg BID    - Initially hypoglycemic with improvement S/P D10 administration  - POCT    COPD (chronic obstructive pulmonary disease)  Home regimen: Breo, combivent      Depression  Patient has persistent depression which is unknown and is currently controlled. Will Continue anti-depressant medications. We will not consult psychiatry at this time. Patient does not display psychosis at this time. Continue to monitor closely and adjust plan of care as needed.    - HOLD home cymbalta given DEVANTE      Obesity (BMI 30.0-34.9)  General weight loss/lifestyle modification strategies discussed (elicit support from others; identify saboteurs; non-food rewards, etc).        VTE Risk Mitigation (From  admission, onward)         Ordered     heparin (porcine) injection 5,000 Units  Every 8 hours         05/15/23 1518     IP VTE HIGH RISK PATIENT  Once         05/15/23 1518     Place sequential compression device  Until discontinued         05/15/23 1320                King aWllace MD  Internal Medicine, PGY-1  [facility]

## 2023-05-15 NOTE — ASSESSMENT & PLAN NOTE
Patient's FSGs are uncontrolled due to hypoglycemia on current medication regimen.  Last A1c reviewed-   Lab Results   Component Value Date    HGBA1C 5.1 03/26/2023     Most recent fingerstick glucose reviewed-   Recent Labs   Lab 05/15/23  1055   POCTGLUCOSE 46*     Current correctional scale  Low  Maintain anti-hyperglycemic dose as follows-   Antihyperglycemics (From admission, onward)    Start     Stop Route Frequency Ordered    05/15/23 1414  insulin aspart U-100 pen 0-5 Units         -- SubQ Before meals & nightly PRN 05/15/23 1321        Hold Oral hypoglycemics while patient is in the hospital. Home regimen: metformin 1000mg BID    - Initially hypoglycemic with improvement S/P D10 administration  - POCT   Rhofade Counseling: Rhofade is a topical medication which can decrease superficial blood flow where applied. Side effects are uncommon and include stinging, redness and allergic reactions.

## 2023-05-15 NOTE — ASSESSMENT & PLAN NOTE
Patient has persistent depression which is unknown and is currently controlled. Will Continue anti-depressant medications. We will not consult psychiatry at this time. Patient does not display psychosis at this time. Continue to monitor closely and adjust plan of care as needed.    - HOLD home cymbalta given DEVANTE

## 2023-05-15 NOTE — HPI
62 year old female with medical history significant for  EtOH use disorder with history of prior seizures, depression with suicide attempt in 2017, breast cancer, HTN, HLD, fibromyalgia, sarcoidosis, hypothyroidism, T2DM, CLL (not on treatment) and COPD presenting with complaints of nausea and vomiting x 2 days associated with generalized weakness. She reports the last time she had a meal was 1 week ago but she has been drinking EtOH very heavily. She reports drinking 0.5 bottles of vodka daily and her last drink was this morning. Per EMS, her BG was reportedly 55 prior to arrival. She states that she has had diarrhea as well. She has had alcohol withdrawals including seizures before. She was recently admitted 4/25-4/20 for the same presentation of nausea and vomiting in setting of 2 weeks of heavy alcohol use. She was initaited on CIWA and treated with valium taper, no seizures during that admission. 3 weeks prior to that she was admitted for acute on chronic respiratory failure due to COPD with non-adherance to home inhalers. She also had a mechanical fall 2 days ago in which she hurt her eye lid. Denies fevers, chest pain, hematemesis or bleeding per rectum.     On arrival to [facility], she is AF, , /96, on 4L NC. WBC 20.11 (baseline tends to fluctuate between leukopenia and leukocytosis). Hgb 11.1, Plt 149, both improved from baseline. Na 131, K 5.1, Cl 90, HCO3 15, BUN 34, Cr 2.2 (bl 0.7). Glucose 46 on arrival, which improved to 234 with administration of D10. Lactate 2.5. CXR without acute cardiopulmonary process.

## 2023-05-15 NOTE — ED NOTES
Patient identifiers verified and correct for Earl Abdul  LOC: The patient is awake, alert and aware of environment with an appropriate affect, the patient is oriented x 3 but sluggish responses.  APPEARANCE: Patient appears comfortable and in no acute distress, patient is clean and well groomed.  SKIN: The skin is warm and dry, color consistent with ethnicity, patient has normal skin turgor and moist mucus membranes, skin intact, pt has bruising to the left periorbital area  MUSCULOSKELETAL: Patient moving all extremities spontaneously, no swelling noted.  RESPIRATORY: Airway is open and patent, respirations are spontaneous, patient has a normal effort and rate, no accessory muscle use noted, pt placed on continuous pulse ox with O2 sats noted at 97% on room air.  CARDIAC: Pt placed on cardiac monitor. Patient has a normal rate and regular rhythm, no edema noted, capillary refill < 3 seconds.   GASTRO: Soft and non tender to palpation, no distention noted, normoactive bowel sounds present in all four quadrants. Pt states bowel movements have been regular.  : Pt denies any pain or frequency with urination.  NEURO: Pt opens eyes spontaneously, behavior appropriate to situation, follows commands, facial expression symmetrical, bilateral hand grasp equal and even, purposeful motor response noted, normal sensation in all extremities when touched with a finger.

## 2023-05-15 NOTE — ED NOTES
"Sent patient to the restroom with a collection cup. Pt returned with empty cup stating, "I forgot to pee in the cup."  "

## 2023-05-15 NOTE — ASSESSMENT & PLAN NOTE
Established with Dr. Gonzalez. Not currently on treatment. Heme/onc appointment scheduled 5/17    - Outpatient Heme-Onc follow up

## 2023-05-15 NOTE — SUBJECTIVE & OBJECTIVE
Past Medical History:   Diagnosis Date    Anemia     Anemia     Controlled type 2 diabetes mellitus without complication, without long-term current use of insulin 11/30/2021    COPD (chronic obstructive pulmonary disease)     Depression     Diverticulitis     Fatty liver     GERD (gastroesophageal reflux disease)     Hyperlipidemia     Hypertension     Pancreatitis     Peptic ulcer disease     Polysubstance abuse     Posterior reversible encephalopathy syndrome     Sarcoidosis of lung     Sarcoidosis of lung     over 30 yrs ago    Seizures     7/2017    Suicide attempt     Suicide ideation        Past Surgical History:   Procedure Laterality Date    APPENDECTOMY      BILATERAL MASTECTOMY Bilateral 10/29/2020    Procedure: MASTECTOMY, BILATERAL;  Surgeon: Baylee Kevin MD;  Location: [address];  Service: General;  Laterality: Bilateral;    BREAST REVISION SURGERY Bilateral 2/11/2021    Procedure: BREAST REVISION SURGERY;  Surgeon: Scottie Johnson MD;  Location: [facility] OR [address] [place] [address];  Service: Plastics;  Laterality: Bilateral;    COLONOSCOPY N/A 7/28/2017    Procedure: COLONOSCOPY;  Surgeon: Aaron Alvarado MD;  Location: [facility];  Service: Endoscopy;  Laterality: N/A;    ESOPHAGOGASTRODUODENOSCOPY  10/7/2016, 11/6/2014    2016 - gastritis, duodenitis, 2014 erosive gastritis    ESOPHAGOGASTRODUODENOSCOPY N/A 2/11/2020    Procedure: ESOPHAGOGASTRODUODENOSCOPY (EGD);  Surgeon: Fawn Garrido MD;  Location: [facility];  Service: Endoscopy;  Laterality: N/A;    ESOPHAGOGASTRODUODENOSCOPY N/A 4/19/2021    Procedure: EGD (ESOPHAGOGASTRODUODENOSCOPY);  Surgeon: Paramjit Martino MD;  Location: [facility];  Service: Endoscopy;  Laterality: N/A;    FLEXIBLE SIGMOIDOSCOPY  11/06/2014    colitis    HYSTERECTOMY      IMPLANTATION OF PERMANENT SACRAL NERVE STIMULATOR N/A 7/12/2022    Procedure: INSERTION, NEUROSTIMULATOR, PERMANENT, SACRAL;  Surgeon: Juaquin Edwards MD;  Location: [facility] OR [address] [place] [address];  Service:  Urology;  Laterality: N/A;  1hr    INJECTION FOR SENTINEL NODE IDENTIFICATION Right 10/29/2020    Procedure: INJECTION, FOR SENTINEL NODE IDENTIFICATION;  Surgeon: Baylee Kevin MD;  [facility];  Service: General;  Laterality: Right;    INJECTION OF JOINT Right 10/10/2019    Procedure: Injection, Joint RIGHT ILIOPSOAS BURSA/TENDON INJECTION AND RIGHT GLUTEAL TENDON INJECTION WITH STEROID AND LIDOCAINE;  Surgeon: Guillaume Rico MD;  Location: [facility];  Service: Pain Management;  Laterality: Right;  NEEDS CONSENT    INSERTION OF BREAST TISSUE EXPANDER Bilateral 10/29/2020    Procedure: INSERTION, TISSUE EXPANDER, BREAST;  Surgeon: Scottie Johnson MD;  Location: [facility] OR [address] [place] [address];  Service: Plastics;  Laterality: Bilateral;  Right breast: 1082 g  Left breast: 1076 g    LIPOSUCTION Bilateral 2/11/2021    Procedure: LIPOSUCTION;  Surgeon: Scottie Johnson MD;  Location: [facility] OR [address] [place] [address];  Service: Plastics;  Laterality: Bilateral;    mediastenoscopy      REPLACEMENT OF IMPLANT OF BREAST Bilateral 2/11/2021    Procedure: REPLACEMENT, IMPLANT, BREAST;  Surgeon: Scottie Johnson MD;  Location: [address];  Service: Plastics;  Laterality: Bilateral;    SENTINEL LYMPH NODE BIOPSY Right 10/29/2020    Procedure: BIOPSY, LYMPH NODE, SENTINEL;  Surgeon: Baylee Kevin MD;  Location: [address];  Service: General;  Laterality: Right;    TONSILLECTOMY N/A 1970    TUBAL LIGATION         Review of patient's allergies indicates:   Allergen Reactions    Lortab [hydrocodone-acetaminophen] Itching    Promethazine Itching and Other (See Comments)       Current Facility-Administered Medications on File Prior to Encounter   Medication    albuterol sulfate nebulizer solution 2.5 mg     Current Outpatient Medications on File Prior to Encounter   Medication Sig    acetaminophen (TYLENOL) 500 MG tablet Take 500-1,500 mg by mouth daily as needed for Pain.    ARIPiprazole (ABILIFY) 5 MG Tab Take 1  tablet (5 mg total) by mouth once daily.    cholecalciferol, vitamin D3, 125 mcg (5,000 unit) capsule Take 5,000 Units by mouth.    COMBIVENT RESPIMAT  mcg/actuation inhaler SMARTSI Puff(s) Via Inhaler Every 6 Hours PRN    diazePAM (VALIUM) 5 MG tablet Take 2 tablets (10 mg total) by mouth 2 (two) times a day for 1 day, THEN 1 tablet (5 mg total) 2 (two) times a day for 2 days, THEN 0.5 tablets (2.5 mg total) 2 (two) times a day for 2 days, THEN 0.5 tablets (2.5 mg total) once daily for 2 days.    dicyclomine (BENTYL) 20 mg tablet Take 20 mg by mouth 4 (four) times daily.    DULoxetine (CYMBALTA) 30 MG capsule Take 1 capsule (30 mg total) by mouth once daily. Take with 60mg =90mg QD    DULoxetine (CYMBALTA) 60 MG capsule Take 60 mg by mouth once daily.    fluticasone furoate-vilanteroL (BREO ELLIPTA) 100-25 mcg/dose diskus inhaler Inhale 1 puff into the lungs once daily. Controller    folic acid (FOLVITE) 1 MG tablet Take 1 tablet (1 mg total) by mouth once daily.    isosorbide mononitrate (IMDUR) 30 MG 24 hr tablet Take 1 tablet (30 mg total) by mouth every evening.    levothyroxine (SYNTHROID) 50 MCG tablet Take 1 tablet (50 mcg total) by mouth before breakfast.    lisinopriL (PRINIVIL,ZESTRIL) 20 MG tablet Take 1 tablet (20 mg total) by mouth once daily.    loperamide (IMODIUM) 2 mg capsule Take 2 mg by mouth 4 (four) times daily as needed for Diarrhea (Per package directions).    melatonin (MELATIN) TAKE 1/2 TABLET TO 1 TABLET BY MOUTH EVERY NIGHT AT BEDTIME    metFORMIN (GLUCOPHAGE-XR) 500 MG ER 24hr tablet Take 2 tablets (1,000 mg total) by mouth 2 (two) times daily with meals.    methocarbamoL (ROBAXIN) 750 MG Tab Take 750 mg by mouth 3 (three) times daily as needed (Pain).    multivitamin (THERAGRAN) per tablet Take 1 tablet by mouth once daily.    ondansetron (ZOFRAN ODT) 4 MG TbDL Take 1 tablet (4 mg total) by mouth every 6 (six) hours as needed (nausea).    pantoprazole (PROTONIX) 40 MG tablet  Take 1 tablet (40 mg total) by mouth every morning.    propranoloL (INDERAL) 20 MG tablet Take 1 tablet (20 mg total) by mouth 2 (two) times daily.    thiamine 100 MG tablet Take 1 tablet (100 mg total) by mouth once daily.    traZODone (DESYREL) 300 MG tablet Take 1 tablet (300 mg total) by mouth every evening.     Family History       Problem Relation (Age of Onset)    Breast cancer Maternal Aunt, Daughter    Colon cancer Maternal Uncle    Diabetes Father, Mother    Heart attack Father    Hypertension Father, Mother          Tobacco Use    Smoking status: Former     Packs/day: 0.50     Years: 30.00     Pack years: 15.00     Types: Vaping with nicotine, Cigarettes     Quit date: 2021     Years since quittin.2    Smokeless tobacco: Never   Substance and Sexual Activity    Alcohol use: Yes     Comment: vodka daily (half a regular bottle)    Drug use: Yes     Types: Marijuana     Comment: gummies    Sexual activity: Yes     Birth control/protection: Surgical     Review of Systems   Constitutional:  Positive for appetite change and chills. Negative for fatigue and fever.   HENT:  Negative for congestion and sinus pain.    Respiratory:  Negative for cough, shortness of breath and wheezing.    Cardiovascular:  Negative for chest pain, palpitations and leg swelling.   Gastrointestinal:  Positive for nausea and vomiting. Negative for abdominal distention, abdominal pain, blood in stool and diarrhea.   Genitourinary:  Negative for difficulty urinating and urgency.   Musculoskeletal:  Negative for back pain, myalgias and neck pain.   Skin:  Negative for rash and wound.   Neurological:  Positive for light-headedness. Negative for dizziness, syncope and headaches.   Hematological:  Bruises/bleeds easily.   Psychiatric/Behavioral:  Negative for agitation and behavioral problems. The patient is nervous/anxious.    Objective:     Vital Signs (Most Recent):  Temp: 98.6 °F (37 °C) (05/15/23 1051)  Pulse: 97 (05/15/23  1355)  Resp: (!) 21 (05/15/23 1355)  BP: 118/68 (05/15/23 1358)  SpO2: 96 % (05/15/23 1355) Vital Signs (24h Range):  Temp:  [98.6 °F (37 °C)] 98.6 °F (37 °C)  Pulse:  [] 97  Resp:  [21-27] 21  SpO2:  [92 %-96 %] 96 %  BP: (118-129)/(68-96) 118/68     Weight: 77.1 kg (170 lb)  Body mass index is 26.63 kg/m².     Physical Exam  Vitals and nursing note reviewed.   Constitutional:       Comments: Pt shivering and uncomfortable in bed   HENT:      Head: Contusion and left periorbital erythema present.      Comments: Bruising present around L eye   Eyes:      General: No scleral icterus.  Cardiovascular:      Rate and Rhythm: Regular rhythm. Tachycardia present.   Pulmonary:      Effort: Pulmonary effort is normal. No respiratory distress.      Breath sounds: No wheezing.   Abdominal:      General: Abdomen is flat. Bowel sounds are normal. There is no distension.      Tenderness: There is no abdominal tenderness.   Musculoskeletal:      Cervical back: Normal range of motion. No rigidity.      Right lower leg: No edema.      Left lower leg: No edema.   Skin:     Findings: Bruising present.   Neurological:      Mental Status: She is alert and oriented to person, place, and time.      Comments: No asterixis present   Psychiatric:         Attention and Perception: Attention normal.         Mood and Affect: Mood is anxious and depressed.         Speech: Speech normal.         Thought Content: Thought content normal.         Cognition and Memory: Cognition normal.         Judgment: Judgment normal.              Significant Labs: All pertinent labs within the past 24 hours have been reviewed.    Significant Imaging: I have reviewed all pertinent imaging results/findings within the past 24 hours.

## 2023-05-15 NOTE — ED PROVIDER NOTES
Encounter Date: 5/15/2023       History     Chief Complaint   Patient presents with    Weakness     Hx of alcoholism, has been drinking heavily for 2 weeks. Told  today she wanted to detox, on EMS arrival pt was vomiting, weak and had a sugar of 55. Received 15g sugar from EMS, sugar is 53 now. Tremor in right hand and left foot, she states is new     62-year-old female, history of alcoholism, diabetes, COPD, hypertension, sarcoidosis, seizures, brought in by EMS after  called secondary to patient having generalized weakness with nausea and vomiting.  Patient has apparently been drinking heavily over the last 2 weeks and has been had plan to take her to detox today.  This morning however patient appeared very weak and was having intractable nausea and vomiting so he called EMS instead.  When EMS arrived patient had a blood sugar of 55, they administered 15 g sugar EN route and on arrival sugar was still low.  Patient states that while she is diabetes she does not take any medications for her diabetes.   apparently said the patient has really not been eating anything over the last 2 weeks.    The history is provided by the patient.   Review of patient's allergies indicates:   Allergen Reactions    Lortab [hydrocodone-acetaminophen] Itching    Promethazine Itching and Other (See Comments)     Past Medical History:   Diagnosis Date    Anemia     Anemia     Controlled type 2 diabetes mellitus without complication, without long-term current use of insulin 11/30/2021    COPD (chronic obstructive pulmonary disease)     Depression     Diverticulitis     Fatty liver     GERD (gastroesophageal reflux disease)     Hyperlipidemia     Hypertension     Pancreatitis     Peptic ulcer disease     Polysubstance abuse     Posterior reversible encephalopathy syndrome     Sarcoidosis of lung     Sarcoidosis of lung     over 30 yrs ago    Seizures     7/2017    Suicide attempt     Suicide ideation      Past Surgical  History:   Procedure Laterality Date    APPENDECTOMY      BILATERAL MASTECTOMY Bilateral 10/29/2020    Procedure: MASTECTOMY, BILATERAL;  Surgeon: Baylee Kevin MD;  [facility];  Service: General;  Laterality: Bilateral;    BREAST REVISION SURGERY Bilateral 2/11/2021    Procedure: BREAST REVISION SURGERY;  Surgeon: Scottie Johnson MD;  Location: [address];  Service: Plastics;  Laterality: Bilateral;    COLONOSCOPY N/A 7/28/2017    Procedure: COLONOSCOPY;  Surgeon: Aaron Alvarado MD;  Location: [facility];  Service: Endoscopy;  Laterality: N/A;    ESOPHAGOGASTRODUODENOSCOPY  10/7/2016, 11/6/2014    2016 - gastritis, duodenitis, 2014 erosive gastritis    ESOPHAGOGASTRODUODENOSCOPY N/A 2/11/2020    Procedure: ESOPHAGOGASTRODUODENOSCOPY (EGD);  Surgeon: Fawn Garrido MD;  Location: [facility];  Service: Endoscopy;  Laterality: N/A;    ESOPHAGOGASTRODUODENOSCOPY N/A 4/19/2021    Procedure: EGD (ESOPHAGOGASTRODUODENOSCOPY);  Surgeon: Paramjit Martino MD;  Location: [facility];  Service: Endoscopy;  Laterality: N/A;    FLEXIBLE SIGMOIDOSCOPY  11/06/2014    colitis    HYSTERECTOMY      IMPLANTATION OF PERMANENT SACRAL NERVE STIMULATOR N/A 7/12/2022    Procedure: INSERTION, NEUROSTIMULATOR, PERMANENT, SACRAL;  Surgeon: Juaquin Edwards MD;  Location: [address];  Service: Urology;  Laterality: N/A;  1hr    INJECTION FOR SENTINEL NODE IDENTIFICATION Right 10/29/2020    Procedure: INJECTION, FOR SENTINEL NODE IDENTIFICATION;  Surgeon: Baylee Kevin MD;  Location: [facility] OR [address] [place] [address];  Service: General;  Laterality: Right;    INJECTION OF JOINT Right 10/10/2019    Procedure: Injection, Joint RIGHT ILIOPSOAS BURSA/TENDON INJECTION AND RIGHT GLUTEAL TENDON INJECTION WITH STEROID AND LIDOCAINE;  Surgeon: Gulilaume Rico MD;  Location: [facility];  Service: Pain Management;  Laterality: Right;  NEEDS CONSENT    INSERTION OF BREAST TISSUE EXPANDER Bilateral 10/29/2020    Procedure: INSERTION,  TISSUE EXPANDER, BREAST;  Surgeon: Scottie Johnson MD;  Location: [address];  Service: Plastics;  Laterality: Bilateral;  Right breast: 1082 g  Left breast: 1076 g    LIPOSUCTION Bilateral 2021    Procedure: LIPOSUCTION;  Surgeon: Scottie Johnson MD;  Location: [address];  Service: Plastics;  Laterality: Bilateral;    mediastenoscopy      REPLACEMENT OF IMPLANT OF BREAST Bilateral 2021    Procedure: REPLACEMENT, IMPLANT, BREAST;  Surgeon: Scottie Johnson MD;  Location: [address];  Service: Plastics;  Laterality: Bilateral;    SENTINEL LYMPH NODE BIOPSY Right 10/29/2020    Procedure: BIOPSY, LYMPH NODE, SENTINEL;  Surgeon: Baylee Kevin MD;  Location: [address];  Service: General;  Laterality: Right;    TONSILLECTOMY N/A     TUBAL LIGATION       Family History   Problem Relation Age of Onset    Heart attack Father     Diabetes Father     Hypertension Father     Diabetes Mother     Hypertension Mother     Breast cancer Maternal Aunt     Colon cancer Maternal Uncle     Breast cancer Daughter     Ovarian cancer Neg Hx     Cancer Neg Hx      Social History     Tobacco Use    Smoking status: Former     Packs/day: 0.50     Years: 30.00     Pack years: 15.00     Types: Vaping with nicotine, Cigarettes     Quit date: 2021     Years since quittin.2    Smokeless tobacco: Never   Substance Use Topics    Alcohol use: Yes     Comment: vodka daily (half a regular bottle)    Drug use: Yes     Types: Marijuana     Comment: gummies     Review of Systems    Physical Exam     Initial Vitals   BP Pulse Resp Temp SpO2   05/15/23 1050 05/15/23 1050 05/15/23 1313 05/15/23 1051 05/15/23 1050   (!) 129/96 101 (!) 27 98.6 °F (37 °C) 96 %      MAP       --                Physical Exam    Nursing note and vitals reviewed.  Constitutional: Vital signs are normal. She appears well-developed and well-nourished. She is not diaphoretic.  Non-toxic appearance. She does not appear  ill. No distress.   HENT:   Head: Normocephalic and atraumatic.   Mouth/Throat: Oropharynx is clear and moist and mucous membranes are normal. Mucous membranes are not dry.   Eyes: Conjunctivae and lids are normal.   Neck: Neck supple.   Normal range of motion.  Cardiovascular:  Normal rate.           Pulmonary/Chest: No respiratory distress.   Abdominal: Abdomen is soft. She exhibits no distension. There is no abdominal tenderness. There is no rebound and no guarding.   Musculoskeletal:         General: No edema.      Cervical back: Normal range of motion and neck supple.     Neurological: She is alert and oriented to person, place, and time. She has normal strength.   Patient able to answer my questions appropriately, follows commands, no focal weakness or numbness.  Mild tremor with outstretched hands   Skin: Skin is warm, dry and intact. No pallor.   Psychiatric: She has a normal mood and affect. Her speech is normal and behavior is normal.       ED Course   Procedures  Labs Reviewed   CBC W/ AUTO DIFFERENTIAL - Abnormal; Notable for the following components:       Result Value    WBC 20.11 (*)     Hemoglobin 11.1 (*)     MCHC 29.1 (*)     RDW 18.5 (*)     Platelets 149 (*)     Gran % 20.0 (*)     Lymph % 79.0 (*)     Mono % 0.0 (*)     Platelet Estimate Decreased (*)     All other components within normal limits   COMPREHENSIVE METABOLIC PANEL - Abnormal; Notable for the following components:    Sodium 131 (*)     Chloride 90 (*)     CO2 15 (*)     Glucose 234 (*)     BUN 34 (*)     Creatinine 2.2 (*)     Alkaline Phosphatase 48 (*)     Anion Gap 26 (*)     eGFR 24.4 (*)     All other components within normal limits   LACTIC ACID, PLASMA - Abnormal; Notable for the following components:    Lactate (Lactic Acid) 2.5 (*)     All other components within normal limits   MAGNESIUM - Abnormal; Notable for the following components:    Magnesium 1.3 (*)     All other components within normal limits   ISTAT PROCEDURE -  Abnormal; Notable for the following components:    POC Glucose 233 (*)     POC BUN 33 (*)     POC Creatinine 2.5 (*)     POC Sodium 129 (*)     POC TCO2 (MEASURED) 18 (*)     POC Ionized Calcium 1.05 (*)     All other components within normal limits   POCT GLUCOSE - Abnormal; Notable for the following components:    POCT Glucose 46 (*)     All other components within normal limits   ISTAT PROCEDURE - Abnormal; Notable for the following components:    POC PH 7.248 (*)     POC PCO2 46.2 (*)     POC HCO3 20.2 (*)     POC SATURATED O2 86 (*)     POC TCO2 22 (*)     All other components within normal limits   LIPASE   PROTIME-INR   URINALYSIS, REFLEX TO URINE CULTURE   HEPATIC FUNCTION PANEL   ALCOHOL,MEDICAL (ETHANOL)   PHOSPHATIDYLETHANOL (PETH)   LACTIC ACID, PLASMA   TSH   PROCALCITONIN   BILIRUBIN, DIRECT   IRON AND TIBC   FERRITIN   PROTIME-INR   VITAMIN B1   FOLATE   VITAMIN B12   TOXICOLOGY SCREEN, URINE, RANDOM (COMPLIANCE)   BETA - HYDROXYBUTYRATE, SERUM   SALICYLATE LEVEL   ISTAT CHEM8          Imaging Results              CT Head Without Contrast (In process)  Result time 05/15/23 14:38:22                     X-Ray Chest AP Portable (Final result)  Result time 05/15/23 13:57:10      Final result by Shon Boles MD (05/15/23 13:57:10)                   Impression:      1. No acute cardiopulmonary process.      Electronically signed by: Shon Boles MD  Date:    05/15/2023  Time:    13:57               Narrative:    EXAMINATION:  XR CHEST AP PORTABLE    CLINICAL HISTORY:  leukocytosis;    TECHNIQUE:  Single frontal view of the chest was performed.    COMPARISON:  04/26/2023    FINDINGS:  The cardiomediastinal silhouette is not enlarged noting calcification of the aorta.  There is no pleural effusion.  The trachea is midline.  The lungs are symmetrically expanded bilaterally with mildly coarse interstitial attenuation, accentuated by overlying habitus..  No large focal consolidation seen.  There is no  pneumothorax.  The osseous structures are remarkable for degenerative changes.  Surgical changes are noted of the breast tissue..                                    X-Rays:   Independently Interpreted Readings:   Head CT: No hemorrhage.   Medications   multivitamin tablet (1 tablet Oral Not Given 5/15/23 1415)   folic acid tablet 1 mg (has no administration in time range)   thiamine tablet 100 mg (100 mg Oral Not Given 5/15/23 1415)   diazePAM tablet 10 mg (has no administration in time range)   sodium chloride 0.9% flush 10 mL (has no administration in time range)   naloxone 0.4 mg/mL injection 0.02 mg (has no administration in time range)   glucagon (human recombinant) injection 1 mg (has no administration in time range)   ondansetron injection 4 mg (4 mg Intravenous Given 5/15/23 1440)   insulin aspart U-100 pen 0-5 Units (has no administration in time range)   melatonin tablet 6 mg (has no administration in time range)   acetaminophen tablet 650 mg (has no administration in time range)   dextrose 10% bolus 125 mL 125 mL (has no administration in time range)   dextrose 10% bolus 250 mL 250 mL (has no administration in time range)   dextrose 40 % gel 15,000 mg (has no administration in time range)   dextrose 40 % gel 30,000 mg (has no administration in time range)   lactated ringers infusion (has no administration in time range)   lactated ringers bolus 1,000 mL (0 mLs Intravenous Stopped 5/15/23 1227)   magnesium sulfate 2g in water 50mL IVPB (premix) (0 g Intravenous Stopped 5/15/23 1432)   diazePAM injection 5 mg (5 mg Intravenous Given 5/15/23 1315)     Medical Decision Making:   History:   Old Medical Records: I decided to obtain old medical records.  Old Records Summarized: records from clinic visits and records from previous admission(s).  Initial Assessment:   Ill-appearing but hemodynamically stable  Abdominal exam benign  Left periorbital ecchymoses c/w recent head trauma  Differential Diagnosis:    Suspicion for recurrent alcoholic ketoacidosis  Other possibilities include pancreatitis, hepatitis, electrolyte disturbance, dehydration, alcohol withdrawal  Independently Interpreted Test(s):   I have ordered and independently interpreted X-rays - see prior notes.  I have ordered and independently interpreted EKG Reading(s) - see prior notes  Clinical Tests:   Lab Tests: Ordered and Reviewed  Radiological Study: Ordered and Reviewed  Medical Tests: Ordered and Reviewed  ED Management:  Labs  IV fluids  Anticipate likely need for admission          Attending Attestation:         Attending Critical Care:   Critical Care Times:   ==============================================================  Total Critical Care Time - exclusive of procedural time: 30 minutes.  ==============================================================  Critical care was necessary to treat or prevent imminent or life-threatening deterioration of the following conditions: metabolic crisis and renal failure.   Critical care was time spent personally by me on the following activities: obtaining history from patient or relative, examination of patient, review of old charts, review of x-rays / CT sent with the patient, ordering lab, x-rays, and/or EKG, ordering and performing treatments and interventions, evaluation of patient's response to treatment and re-evaluation of patient's conition.   Critical Care Condition: potentially life-threatening         ED Course as of 05/15/23 1442   Mon May 15, 2023   1158 Anion Gap(!): 26 [AS]   1158 CO2(!): 15 [AS]   1158 Creatinine(!): 2.2 [AS]   1158 Magnesium(!): 1.3  Recurrent alcoholic AKA.  Also low mag.  Will treat with IVF, glucose already elevated s/p D10 admminitration.  Will also replete mag.  Plan to admit [AS]   1220 WBC(!): 20.11 [AS]   1220 Patient reassessed.  I am now observing that she has significant ecchymosis to her left periorbital region and left eyelid, patient states that she fell,  tripped over the dog 2 nights ago and struck her head.  I have added on a CT head to further assess and ensure that she does not have any sort of traumatic intracranial hemorrhage [AS]      ED Course User Index  [AS] Eun Mccoy MD                   Clinical Impression:   Final diagnoses:  [R11.0] Nausea  [N17.9] DEVANTE (acute kidney injury) (Primary)        ED Disposition Condition    Admit Stable                Eun Mccoy MD  05/15/23 8943

## 2023-05-15 NOTE — ASSESSMENT & PLAN NOTE
62-year-old female with significant alcohol use history and prior alcohol withdrawal seizures presenting with nausea and vomiting in the setting of heavy alcohol use and noted to have metabolic acidosis on presentation. Mild tremors noted. Given Valium 1x in the ED. Denies hematemesis or melena.    - START Valium taper  - CIWA protocol  - IV Ativan PRN for CIWA > 8  - F/U UDS, serum Ethanol, b-hydroxybutarate, PETH  - Thiamine, multivitamin  - IVF resuscitaion

## 2023-05-15 NOTE — ED TRIAGE NOTES
Weakness (Hx of alcoholism, has been drinking heavily for 2 weeks. Told  today she wanted to detox, on EMS arrival pt was vomiting, weak and had a sugar of 55. Received 15g sugar from EMS, sugar is 53 now. Tremor in right hand and left foot, she states is new)

## 2023-05-15 NOTE — ASSESSMENT & PLAN NOTE
"See "Alcoholic Ketoacidosis" Has attempted Rehab in the past. She is concerned her EtOH use is affecting her marriage.     - Addiction Psychiatry consulted; appreciate recommendations  "

## 2023-05-16 LAB
ALBUMIN SERPL BCP-MCNC: 3.6 G/DL (ref 3.5–5.2)
ALP SERPL-CCNC: 39 U/L (ref 55–135)
ALT SERPL W/O P-5'-P-CCNC: 16 U/L (ref 10–44)
ANION GAP SERPL CALC-SCNC: 14 MMOL/L (ref 8–16)
AST SERPL-CCNC: 30 U/L (ref 10–40)
BASOPHILS # BLD AUTO: 0.02 K/UL (ref 0–0.2)
BASOPHILS NFR BLD: 0.3 % (ref 0–1.9)
BILIRUB SERPL-MCNC: 0.7 MG/DL (ref 0.1–1)
BUN SERPL-MCNC: 24 MG/DL (ref 8–23)
CALCIUM SERPL-MCNC: 8.3 MG/DL (ref 8.7–10.5)
CHLORIDE SERPL-SCNC: 97 MMOL/L (ref 95–110)
CO2 SERPL-SCNC: 24 MMOL/L (ref 23–29)
CREAT SERPL-MCNC: 1.2 MG/DL (ref 0.5–1.4)
DIFFERENTIAL METHOD: ABNORMAL
EOSINOPHIL # BLD AUTO: 0.1 K/UL (ref 0–0.5)
EOSINOPHIL NFR BLD: 0.8 % (ref 0–8)
ERYTHROCYTE [DISTWIDTH] IN BLOOD BY AUTOMATED COUNT: 18.4 % (ref 11.5–14.5)
EST. GFR  (NO RACE VARIABLE): 50.5 ML/MIN/1.73 M^2
GLUCOSE SERPL-MCNC: 72 MG/DL (ref 70–110)
HCT VFR BLD AUTO: 30.8 % (ref 37–48.5)
HGB BLD-MCNC: 9.6 G/DL (ref 12–16)
IMM GRANULOCYTES # BLD AUTO: 0.01 K/UL (ref 0–0.04)
IMM GRANULOCYTES NFR BLD AUTO: 0.2 % (ref 0–0.5)
LYMPHOCYTES # BLD AUTO: 4.3 K/UL (ref 1–4.8)
LYMPHOCYTES NFR BLD: 72.3 % (ref 18–48)
MAGNESIUM SERPL-MCNC: 1.4 MG/DL (ref 1.6–2.6)
MCH RBC QN AUTO: 28.1 PG (ref 27–31)
MCHC RBC AUTO-ENTMCNC: 31.2 G/DL (ref 32–36)
MCV RBC AUTO: 90 FL (ref 82–98)
MONOCYTES # BLD AUTO: 0.3 K/UL (ref 0.3–1)
MONOCYTES NFR BLD: 5.7 % (ref 4–15)
NEUTROPHILS # BLD AUTO: 1.2 K/UL (ref 1.8–7.7)
NEUTROPHILS NFR BLD: 20.7 % (ref 38–73)
NRBC BLD-RTO: 0 /100 WBC
PHOSPHATE SERPL-MCNC: 2.1 MG/DL (ref 2.7–4.5)
PLATELET # BLD AUTO: 66 K/UL (ref 150–450)
PLATELET BLD QL SMEAR: ABNORMAL
PMV BLD AUTO: 10.3 FL (ref 9.2–12.9)
POCT GLUCOSE: 118 MG/DL (ref 70–110)
POCT GLUCOSE: 128 MG/DL (ref 70–110)
POCT GLUCOSE: 132 MG/DL (ref 70–110)
POCT GLUCOSE: 133 MG/DL (ref 70–110)
POCT GLUCOSE: 67 MG/DL (ref 70–110)
POCT GLUCOSE: 95 MG/DL (ref 70–110)
POTASSIUM SERPL-SCNC: 4.5 MMOL/L (ref 3.5–5.1)
PROT SERPL-MCNC: 5.8 G/DL (ref 6–8.4)
RBC # BLD AUTO: 3.42 M/UL (ref 4–5.4)
SODIUM SERPL-SCNC: 135 MMOL/L (ref 136–145)
WBC # BLD AUTO: 5.93 K/UL (ref 3.9–12.7)

## 2023-05-16 PROCEDURE — 99233 SBSQ HOSP IP/OBS HIGH 50: CPT | Mod: ,,, | Performed by: STUDENT IN AN ORGANIZED HEALTH CARE EDUCATION/TRAINING PROGRAM

## 2023-05-16 PROCEDURE — 25000003 PHARM REV CODE 250

## 2023-05-16 PROCEDURE — 94761 N-INVAS EAR/PLS OXIMETRY MLT: CPT

## 2023-05-16 PROCEDURE — 99222 1ST HOSP IP/OBS MODERATE 55: CPT | Mod: ,,, | Performed by: PSYCHIATRY & NEUROLOGY

## 2023-05-16 PROCEDURE — 11000001 HC ACUTE MED/SURG PRIVATE ROOM

## 2023-05-16 PROCEDURE — 99233 PR SUBSEQUENT HOSPITAL CARE,LEVL III: ICD-10-PCS | Mod: ,,, | Performed by: STUDENT IN AN ORGANIZED HEALTH CARE EDUCATION/TRAINING PROGRAM

## 2023-05-16 PROCEDURE — 84100 ASSAY OF PHOSPHORUS: CPT

## 2023-05-16 PROCEDURE — 80053 COMPREHEN METABOLIC PANEL: CPT

## 2023-05-16 PROCEDURE — 63600175 PHARM REV CODE 636 W HCPCS

## 2023-05-16 PROCEDURE — 25000003 PHARM REV CODE 250: Performed by: STUDENT IN AN ORGANIZED HEALTH CARE EDUCATION/TRAINING PROGRAM

## 2023-05-16 PROCEDURE — 27000221 HC OXYGEN, UP TO 24 HOURS

## 2023-05-16 PROCEDURE — 99222 PR INITIAL HOSPITAL CARE,LEVL II: ICD-10-PCS | Mod: ,,, | Performed by: PSYCHIATRY & NEUROLOGY

## 2023-05-16 PROCEDURE — 85025 COMPLETE CBC W/AUTO DIFF WBC: CPT

## 2023-05-16 PROCEDURE — 90833 PSYTX W PT W E/M 30 MIN: CPT | Mod: ,,, | Performed by: PSYCHIATRY & NEUROLOGY

## 2023-05-16 PROCEDURE — 36415 COLL VENOUS BLD VENIPUNCTURE: CPT

## 2023-05-16 PROCEDURE — 90833 PR PSYCHOTHERAPY W/PATIENT W/E&M, 30 MIN (ADD ON): ICD-10-PCS | Mod: ,,, | Performed by: PSYCHIATRY & NEUROLOGY

## 2023-05-16 PROCEDURE — 83735 ASSAY OF MAGNESIUM: CPT

## 2023-05-16 RX ORDER — MAGNESIUM SULFATE HEPTAHYDRATE 40 MG/ML
2 INJECTION, SOLUTION INTRAVENOUS ONCE
Status: COMPLETED | OUTPATIENT
Start: 2023-05-16 | End: 2023-05-16

## 2023-05-16 RX ORDER — DIAZEPAM 5 MG/1
10 TABLET ORAL EVERY 6 HOURS
Status: COMPLETED | OUTPATIENT
Start: 2023-05-16 | End: 2023-05-16

## 2023-05-16 RX ORDER — ONDANSETRON 4 MG/1
4 TABLET, ORALLY DISINTEGRATING ORAL ONCE
Status: COMPLETED | OUTPATIENT
Start: 2023-05-16 | End: 2023-05-16

## 2023-05-16 RX ORDER — DIAZEPAM 5 MG/1
10 TABLET ORAL EVERY 12 HOURS
Status: DISCONTINUED | OUTPATIENT
Start: 2023-05-18 | End: 2023-05-17

## 2023-05-16 RX ORDER — DIAZEPAM 5 MG/1
10 TABLET ORAL EVERY 8 HOURS
Status: DISCONTINUED | OUTPATIENT
Start: 2023-05-17 | End: 2023-05-17

## 2023-05-16 RX ORDER — LORAZEPAM 2 MG/ML
2 INJECTION INTRAMUSCULAR EVERY 6 HOURS PRN
Status: DISCONTINUED | OUTPATIENT
Start: 2023-05-16 | End: 2023-05-19

## 2023-05-16 RX ORDER — DIAZEPAM 5 MG/1
10 TABLET ORAL ONCE
Status: DISCONTINUED | OUTPATIENT
Start: 2023-05-19 | End: 2023-05-17

## 2023-05-16 RX ADMIN — SODIUM CHLORIDE, POTASSIUM CHLORIDE, SODIUM LACTATE AND CALCIUM CHLORIDE: 600; 310; 30; 20 INJECTION, SOLUTION INTRAVENOUS at 07:05

## 2023-05-16 RX ADMIN — HEPARIN SODIUM 5000 UNITS: 5000 INJECTION INTRAVENOUS; SUBCUTANEOUS at 02:05

## 2023-05-16 RX ADMIN — DEXTROSE 15000 MG: 15 GEL ORAL at 07:05

## 2023-05-16 RX ADMIN — DIAZEPAM 10 MG: 5 TABLET ORAL at 06:05

## 2023-05-16 RX ADMIN — Medication 2 TABLET: at 06:05

## 2023-05-16 RX ADMIN — Medication 2 TABLET: at 02:05

## 2023-05-16 RX ADMIN — LEVOTHYROXINE SODIUM 50 MCG: 50 TABLET ORAL at 05:05

## 2023-05-16 RX ADMIN — DIAZEPAM 10 MG: 5 TABLET ORAL at 09:05

## 2023-05-16 RX ADMIN — SODIUM CHLORIDE, POTASSIUM CHLORIDE, SODIUM LACTATE AND CALCIUM CHLORIDE: 600; 310; 30; 20 INJECTION, SOLUTION INTRAVENOUS at 09:05

## 2023-05-16 RX ADMIN — ACETAMINOPHEN 650 MG: 325 TABLET ORAL at 02:05

## 2023-05-16 RX ADMIN — FOLIC ACID 1 MG: 1 TABLET ORAL at 09:05

## 2023-05-16 RX ADMIN — HEPARIN SODIUM 5000 UNITS: 5000 INJECTION INTRAVENOUS; SUBCUTANEOUS at 09:05

## 2023-05-16 RX ADMIN — ONDANSETRON 4 MG: 4 TABLET, ORALLY DISINTEGRATING ORAL at 04:05

## 2023-05-16 RX ADMIN — HEPARIN SODIUM 5000 UNITS: 5000 INJECTION INTRAVENOUS; SUBCUTANEOUS at 05:05

## 2023-05-16 RX ADMIN — THERA TABS 1 TABLET: TAB at 09:05

## 2023-05-16 RX ADMIN — MAGNESIUM SULFATE 2 G: 2 INJECTION INTRAVENOUS at 09:05

## 2023-05-16 RX ADMIN — THIAMINE HCL TAB 100 MG 100 MG: 100 TAB at 09:05

## 2023-05-16 RX ADMIN — DIAZEPAM 10 MG: 5 TABLET ORAL at 01:05

## 2023-05-16 RX ADMIN — LORAZEPAM 2 MG: 2 INJECTION INTRAMUSCULAR; INTRAVENOUS at 05:05

## 2023-05-16 RX ADMIN — ONDANSETRON 4 MG: 2 INJECTION INTRAMUSCULAR; INTRAVENOUS at 09:05

## 2023-05-16 RX ADMIN — Medication 6 MG: at 10:05

## 2023-05-16 RX ADMIN — PROCHLORPERAZINE EDISYLATE 2.5 MG: 5 INJECTION INTRAMUSCULAR; INTRAVENOUS at 11:05

## 2023-05-16 RX ADMIN — PROCHLORPERAZINE EDISYLATE 2.5 MG: 5 INJECTION INTRAMUSCULAR; INTRAVENOUS at 05:05

## 2023-05-16 RX ADMIN — LORAZEPAM 2 MG: 2 INJECTION INTRAMUSCULAR; INTRAVENOUS at 07:05

## 2023-05-16 RX ADMIN — ONDANSETRON 4 MG: 2 INJECTION INTRAMUSCULAR; INTRAVENOUS at 05:05

## 2023-05-16 RX ADMIN — CEFTRIAXONE 1 G: 1 INJECTION, POWDER, FOR SOLUTION INTRAMUSCULAR; INTRAVENOUS at 04:05

## 2023-05-16 NOTE — HOSPITAL COURSE
Patient admitted with Alcoholic ketoacidosis and concerns for Alcohol withdrawal. The patient was started on a Valium taper with IV ativan PRN in place for CIWA >8. She continued to be nauseous, have multiple episodes of emesis, and complain of a headache for the first two days of admission, thus required extra PRN IV ativan doses. Fluid resuscitated. Urine Cx grew Klebsiella and pt was given 3 days of IV Ceftriaxone. Improvement in withdrawal symptoms noted with continued taper. Evaluated by Addiction psychiatry and is planning to go to Rehab on discharge. Counseled on the need to stop drinking. Prescribed Naltrexone and multivitamins prior to discharge.

## 2023-05-16 NOTE — PLAN OF CARE
No significant changes this shift. Continue with POC and monitor. Pt left lying semi fowlers bed in lowest position, with call light in reach, and all wheels locked X3 rails up.

## 2023-05-16 NOTE — ASSESSMENT & PLAN NOTE
Patient with acute kidney injury likely due to IVVD/dehydration DEVANTE is currently stable. Labs reviewed- Renal function/electrolytes with Estimated Creatinine Clearance: 49.3 mL/min (based on SCr of 1.2 mg/dL). according to latest data. Monitor urine output and serial BMP and adjust therapy as needed. Avoid nephrotoxins and renally dose meds for GFR listed above.     - Creatinine 2.2 on admit, baseline around 0.6    Plan:   Lab Results   Component Value Date    CREATININE 1.2 05/16/2023     - Check urine lytes and UPCR  - IVF  - Avoid nephrotoxic agents such as NSAIDs, gadolinium and IV radiocontrast.  - Renally dose meds to current GFR.  - Maintain MAP > 65.    IMPROVED

## 2023-05-16 NOTE — ASSESSMENT & PLAN NOTE
Patient's FSGs are uncontrolled due to hypoglycemia on current medication regimen.  Last A1c reviewed-   Lab Results   Component Value Date    HGBA1C 5.1 03/26/2023     Most recent fingerstick glucose reviewed-   Recent Labs   Lab 05/16/23  0713 05/16/23  0747 05/16/23  1052 05/16/23  1539   POCTGLUCOSE 67* 128* 132* 133*     Current correctional scale  Low  Maintain anti-hyperglycemic dose as follows-   Antihyperglycemics (From admission, onward)    Start     Stop Route Frequency Ordered    05/15/23 1414  insulin aspart U-100 pen 0-5 Units         -- SubQ Before meals & nightly PRN 05/15/23 1321        Hold Oral hypoglycemics while patient is in the hospital. Home regimen: metformin 1000mg BID    - Initially hypoglycemic with improvement S/P D10 administration  - POCT

## 2023-05-16 NOTE — NURSING
Nurses Note -- 4 Eyes      5/15/2023   11:42 PM      Skin assessed during: Admit      [x] No Altered Skin Integrity Present    []Prevention Measures Documented      [] Yes- Altered Skin Integrity Present or Discovered   [] LDA Added if Not in Epic (Describe Wound)   [] New Altered Skin Integrity was Present on Admit and Documented in LDA   [] Wound Image Taken    Wound Care Consulted? No    Attending Nurse:  Coleen Boudreaux RN     Second RN/Staff Member: CLAUDIA Whitaker

## 2023-05-16 NOTE — CONSULTS
INITIAL VISIT: ADDICTION PSYCHIATRY CONSULTATION SERVICE      ASSESSMENT AND PLAN:     DIAGNOSES & PROBLEMS:  Alcohol use disorder, severe  Tobacco use disorder  Major depressive disorder, recurrent  Unspecified anxiety disorder    In Summary:  62 y.o. female with a history of alcohol use disorder with complicated withdrawal, anxiety, and depression who presented on 5/15/2023 with chief complaint of nausea, vomiting, and generalized weakness; she was ultimately admitted for alcoholic ketoacidosis and alcohol withdrawals. Addiction psychiatry was consulted for assistance with alcohol withdrawal and alcohol use disorder management. She required Valium and Ativan in the ED for withdrawal symptoms and has been started on scheduled Valium taper by primary team. On initial psychiatric evaluation, patient appeared tremulous and in marked amount of emotional distress, appearing objectively depressed and despondent about her situation. She was recently evaluated by addiction psychiatry service during similar presentation last month, and was discharged with plan [facility]; patient never started IOP after discharged returned to drinking a half bottle of vodka daily with last drink the morning of presentation (5/15). She endorsed significant depressive and anxiety symptoms (but denied SI/HI/AVH). She expressed intent to start IOP after discharge.    Plan:  - Continue Valium taper as ordered by primary  - Continue PRN Ativan, CIWA protocol for breakthrough alcohol withdrawal symptoms  - Continue thiamine, folate, and multivitamin supplementation  - Resume home Cymbalta when able (originally held due to DEVANTE)       - continue alcohol withdrawal protocol while patient is in house, including supportive measures,frequent monitoring of vitals and CIWA-Ar, and initiation of PRN +/- standing benzodiazepines to minimize risk of complicated withdrawal  - MAT for alcohol use disorder was discussed including acamprosate,  naltrexone and disulfiram  - patient counseled on abstinence from alcohol and substances of abuse (illicit and prescription)  - counseled on full engagement in 12 step (or equivalent) recovery program(s), including acquisition of a sponsor  - recommend patient enroll in addiction rehab program after discharge and medical stabilization  - addiction resource sheet provided to patient  - relapse prevention and motivational interviewing provided      PRESENTATION:     Earl Abdul presents with the following chief complaint: problematic substance use/abuse and alcohol and/or drug addiction    Per Chart:  62 year old female with medical history significant for  EtOH use disorder with history of prior seizures, depression with suicide attempt in 2017, breast cancer, HTN, HLD, fibromyalgia, sarcoidosis, hypothyroidism, T2DM, CLL (not on treatment) and COPD presenting with complaints of nausea and vomiting x 2 days associated with generalized weakness. She reports the last time she had a meal was 1 week ago but she has been drinking EtOH very heavily. She reports drinking 0.5 bottles of vodka daily and her last drink was this morning. Per EMS, her BG was reportedly 55 prior to arrival. She states that she has had diarrhea as well. She has had alcohol withdrawals including seizures before. She was recently admitted 4/25-4/20 for the same presentation of nausea and vomiting in setting of 2 weeks of heavy alcohol use. She was initaited on CIWA and treated with valium taper, no seizures during that admission. 3 weeks prior to that she was admitted for acute on chronic respiratory failure due to COPD with non-adherance to home inhalers. She also had a mechanical fall 2 days ago in which she hurt her eye lid. Denies fevers, chest pain, hematemesis or bleeding per rectum.      On arrival to [facility], she is AF, , /96, on 4L NC. WBC 20.11 (baseline tends to fluctuate between leukopenia and leukocytosis). Hgb 11.1, Plt  "149, both improved from baseline. Na 131, K 5.1, Cl 90, HCO3 15, BUN 34, Cr 2.2 (bl 0.7). Glucose 46 on arrival, which improved to 234 with administration of D10. Lactate 2.5. CXR without acute cardiopulmonary process.     Of note, patient was recently admitted to [facility] [place] in [place] 2023 for alcohol withdrawal at which time she was evaluated by addiction psychiatry service. She was discharged with plan to follow up at [facility].     Per Patient:  Patient was found resting in bed. She appeared markedly tremulous and in a notable amount of physical and emotional distress. She was disoriented to date ("April 23, 2022"), but oriented to situation, location, and the current president. She explained that after being discharged from the hospital last month, she registered for but ultimately did not start the [facility]. She resumed alcohol consumption within a week of hospital discharge and has gotten back up to her typical pattern of half a bottle of vodka daily. Her last alcoholic beverage was the morning of admission (approximately 24 hours prior to this initial psychiatric evaluation). She had called EMS to be brought to the hospital after drinking so much that she was unable to walk. She stated "I feel like I've given up on life" but denied active suicidal ideation. She further endorsed depressed mood, anhedonia, amotivation, insomnia, anorexia, hopelessness, fatigue, diminished concentration, and increased anxiety. She denied homicidal thoughts or hallucinations. She has been taking Cymbalta and trazodone but has not been taking Abilify. She expressed her intent to [facility] after discharge.      REVIEW OF SYSTEMS:  I[]I Patient denies any pertinent ROS, and none is known.  I[]I Patient unable or unwilling to provide any ROS.    [x] Y  [] N  sleep disturbance: Positive for: middle insomnia, night-time awakenings  [x] Y  [] N  appetite/weight change: Positive for: decreased appetite  [x] Y  [] N  " "fatigue/anergia: Positive for: sedation  [x] Y  [] N  impairment in focus/concentration: Positive for: diminished ability to think or concentrate     [x] Y  [] N  depression: Positive for: depressed mood, anhedonia, amotivation, hopelessness  [x] Y  [] N  anxiety/worry: Positive for: excessive generalized anxiety, panic attacks  [] Y  [x] N  dysregulated mood/behavior:  Negative for: mood lability, irritability  [] Y  [x] N  manic symptomatology: Negative for: elevated mood, expansive mood, grandiosity, pressured speech, flight of ideas, increase in goal-directed activity  [] Y  [x] N  psychosis: Negative for: paranoia, hallucinations, delusions      A pertinent medical review of systems was performed with the following notable findings: nausea, fatigue, tremulousness, anorexia    CURRENT PSYCHOTROPIC REGIMEN:  I[x]I Y  I[]I N  I[]I U    Cymbalta 60 mg daily  Trazodone 200 mg nightly  Abilify 5 mg daily (not taking)      ADDICTION:     I[x]I Y  I[]I N  I[]I U  I[x]I Current  I[]I Former  Nicotine Use: cigarettes <1/2 pack per day, and vaping  I[x]I Y  I[]I N  I[]I U  I[x]I Current  I[]I Former  Alcohol Use:   I[x]I Y  I[]I N  I[]I U  I[x]I Current  I[]I Former  Alcohol Misuse/Abuse:   I[]I Y  I[]I N  I[]I U  I[]I Current  I[]I Former  Illicit Drug Use/Misuse/Abuse:   I[]I Y  I[]I N  I[x]I U  I[]I Current  I[]I Former  Misuse/Abuse of Rx Medications:   I[x]I Cannabis  I[]I Cocaine  I[]I Heroin  I[]I Meth  I[]I Opioids  I[]I Stimulants  I[]I Benzos  I[]I Other:     I[]I N/A  I[]I U  Substance(s) of Choice: alcohol (vodka), nicotine, occasional marijuana  I[]I N/A  I[]I U  Substance(s) Used Currently/Recently: alcohol, nicotine  I[]I N/A  I[]I U  Alcohol Consumption:  1/2 pint of vodka daily or 2-3 six ounce drinks of vodka  I[]I N/A  I[]I U  Last Drink: 10:00 AM on 5/15/2023  I[]I N/A  I[]I U  Last Drug Use: "a long time ago"  I[]I N/A  I[]I U  Duration of Sobriety/Abstinence: "off and " "on"    I[x]I Y  I[]I N  I[]I U  Hx of Detox:   I[x]I Y  I[]I N  I[]I U  Hx of Rehab:   I[]I Y  I[x]I N  I[]I U  Hx of IVDU:   I[]I Y  I[x]I N  I[]I U  Hx of Accidental Overdose:   I[]I Y  I[x]I N  I[]I U  Hx of DUI:   I[x]I Y  I[]I N  I[]I U  Hx of Complicated Withdrawal (i.e. Seizures and/or Delirium Tremens):   I[]I Y  I[x]I N  I[]I U  Hx of Known/Suspected Substance-Induced Psychiatric Disorder:   I[]I Y  I[x]I N  I[]I U  Hx of Medication Assisted Treatment:   I[x]I Y  I[]I N  I[]I U  Hx of Twelve Step Program (or Equivalent) Involvement:   I[]I Y  I[x]I N  I[]I U  Currently Exhibits Signs of Intoxication:   I[x]I Y  I[]I N  I[]I U  Currently Exhibits Signs of Withdrawal:   I[]I Y  I[x]I N  I[]I U  Currently Active in Recovery:   I[x]I Y  I[]I N  I[]I U  Social Support:   I[]I Y  I[x]I N  I[]I U  Spouse/Partner Consumption:     I[]I N/A  I[x]I Y  I[]I N  I[]I U  Acknowledges/Accepts Addiction:   I[]I N/A  I[x]I Y  I[]I N  I[]I U  Advised to Quit/Cut Back:   I[]I N/A  I[x]I Y  I[]I N  I[]I U  Alcohol/Drug Cessation ("Wants to Quit"): Interested in Quitting, Advised to Quit , Assistance Provided, Encouragement Provided, Motivational Interviewing Provided, Resources Provided  I[]I N/A  I[x]I Y  I[]I N  I[]I U  Motivation to Pursue Treatment: Moderate  I[]I N/A  I[]I Y  I[x]I N  I[]I U  Tobacco Cessation ("Wants to Quit"): uninterested in quitting or cutting back    DSM-5-TR SUBSTANCE USE DISORDER CRITERIA:     -- Impaired Control:  I[x]I Y  I[]I N  I[]I U  I[]I A  I[]I D  Often take in larger amounts or over a longer period of time than was intended:   I[x]I Y  I[]I N  I[]I U  I[]I A  I[]I D  Persistent desire or unsuccessful efforts to cut down or control use:   I[]I Y  I[]I N  I[x]I U  I[]I A  I[]I D  Great deal of time spent in activities necessary to obtain substance, use, or recover from effects:   I[x]I Y  I[]I N  I[]I U  I[]I A  I[]I D  Craving/strong desire for substance or urge to use:   -- Social " Impairment:  I[]I Y  I[]I N  I[x]I U  I[]I A  I[]I D  Use resulting in failure to fulfill major role obligations at home, work or school:   I[]I Y  I[]I N  I[x]I U  I[]I A  I[]I D  Social, occupational, recreational activities decreased because of use:   I[]I Y  I[]I N  I[x]I U  I[]I A  I[]I D  Continued use despite having persistent or recurrent social or interpersonal problems caused or exacerbated by the substance:   -- Risky Use:  I[]I Y  I[]I N  I[x]I U  I[]I A  I[]I D  Recurrent use in situations in which it is physically hazardous:   I[]I Y  I[]I N  I[x]I U  I[]I A  I[]I D  Use despite physical or psychological problems that are likely to have been caused or exacerbated by the substance:   -- Neuroadaptation:  I[x]I Y  I[]I N  I[]I U  I[]I A  I[]I D  Tolerance, as defined by either of the following: (1) a need for markedly increased amounts of substance to achieve intoxication or desired effect.  -OR- (2) a markedly diminished effect with continued use of the same amount of substance:   I[x]I Y  I[]I N  I[]I U  I[]I A  I[]I D  Withdrawal, as manifested by either of the following: (1) the characteristic withdrawal syndrome for substance.  -OR- (2) substance is taken to relieve or avoid withdrawal symptoms:   -- Mild (1-3), Moderate (4-5), Severe (?6)    I[]I N/A  I[x]I Y  I[]I N  I[]I U  I[]I A  I[]I D  Active Substance Use Disorder:       HISTORY:     I[]I Patient denies any history, and none is known.  I[]I Patient unable or unwilling to provide any history.    I[x]I Y  I[]I N  I[]I U  Psychiatric Diagnoses: depression, anxiety  I[]I Y  I[x]I N  I[]I U  Current Psychiatric Provider (if Applicable):   I[]I Y  I[x]I N  I[]I U  Hx of Psychiatric Hospitalization:   I[x]I Y  I[]I N  I[]I U  Hx of Outpatient Psychiatric Treatment (psychiatry/psychotherapy):   I[x]I Y  I[]I N  I[]I U  Psychotropic Trials: Cymbalta, trazodone, Abilify, Pristiq  I[x]I Y  I[]I N  I[]I U  Prior Suicide Attempts: 2017  I[x]I Y  I[]I N   I[]I U  Hx of Suicidal Ideation:   I[]I Y  I[x]I N  I[]I U  Hx of Homicidal Ideation:   I[]I Y  I[x]I N  I[]I U  Hx of Self-Injurious Behavior (Non-Suicidal):   I[]I Y  I[x]I N  I[]I U  Hx of Violence:   I[]I Y  I[x]I N  I[]I U  Documented Hx of Malingering:     FAMILY HISTORY:  I[]I Y  I[x]I N  I[]I U      I[]I Y  I[x]I N  I[]I U  Hx of Trauma/Neglect:   I[]I Y  I[x]I N  I[]I U  Hx of Physical Abuse:   I[]I Y  I[x]I N  I[]I U  Hx of Sexual Abuse:   I[x]I Y  I[]I N  I[]I U  Grew Up Locally?:   I[x]I Y  I[]I N  I[]I U  Happy Childhood?:   I[]I Y  I[x]I N  I[]I U  Significant Developmental Delay/Disability?:   I[x]I Y  I[]I N  I[]I U  GED/High School Dipoloma?:   I[]I Y  I[x]I N  I[]I U  Post High School Education?:   I[]I Y  I[x]I N  I[]I U  Currently Employed?:   I[]I Y  I[]I N  I[x]I U  On or Applying for Disability?:   I[x]I Y  I[]I N  I[]I U  Functions Independently?:   I[x]I Y  I[]I N  I[]I U  Financially Stable?:   I[x]I Y  I[]I N  I[]I U  Domiciled?:   I[x]I Y  I[]I N  I[]I U  Lives Alone?:   I[x]I Y  I[]I N  I[]I U  Heterosexual/Cisgender?:   I[]I Y  I[x]I N  I[]I U  Currently in a Romantic Relationship?:   I[x]I Y  I[]I N  I[]I U  Ever ?: [facility]  I[x]I Y  I[]I N  I[]I U  Children/Dependents?: two (one  3 years ago via overdose)  I[x]I Y  I[]I N  I[]I U  Sikh/Spiritual?:   I[]I Y  I[x]I N  I[]I U   History?:   I[]I Y  I[x]I N  I[]I U  Current Legal Issues:   I[]I Y  I[x]I N  I[]I U  Past Charges/Convictions:   I[]I Y  I[x]I N  I[]I U  Hx of Incarceration:   I[]I Y  I[x]I N  I[]I U  Engaged in Hobbies/Recreational Activities?:   I[]I Y  I[x]I N  I[]I U  Access to a Gun?:     I[x]I Y  I[]I N  I[]I U  Hx of Seizure: alcohol withdrawals  I[]I Y  I[]I N  I[x]I U  Hx of Significant Head Trauma (e.g., Loss of Consciousness, Concussion, Coma):    I[x]I Y  I[]I N  I[]I U  Medical History & Diagnoses:       The patient's past medical history has been reviewed and  updated as appropriate within the electronic medical record system.  Patient Active Problem List   Diagnosis    Alcohol use disorder, severe, dependence    Alcohol withdrawal    Essential hypertension    Fibromyalgia    Tobacco abuse    Cannabis abuse, in remission    Sarcoidosis    DEVANTE (acute kidney injury)    History of Clostridium difficile colitis    Diarrhea    Dehydration    History of substance abuse    Nausea    Pyelonephritis due to Escherichia coli    Hypomagnesemia    New onset seizure    Posterior reversible encephalopathy syndrome    Depression with anxiety    Erythema nodosum    History of sarcoidosis    Hyperlipidemia    Ramírez's syndrome    Degenerative joint disease (DJD) of lumbar spine    Decreased ROM of lumbar spine    Difficulty walking    VINICIO (obstructive sleep apnea)    At risk for lymphedema    Malignant neoplasm of central portion of right breast in female, estrogen receptor positive    COPD (chronic obstructive pulmonary disease)    Incontinence of feces    Low serum vitamin B12    Anemia    Urge incontinence of urine    Hypothyroidism    Type 2 diabetes mellitus with hyperglycemia    Obesity (BMI 30.0-34.9)    Fecal incontinence    Mixed incontinence    Pelvic floor tension    COVID-19    Hypoxia    Shortness of breath    Alcoholic ketoacidosis    E coli bacteremia    Lymphocytosis    Migraine without aura and with status migrainosus, not intractable    OAB (overactive bladder)    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  See Medcard for details.    Current Facility-Administered Medications:     acetaminophen tablet 650 mg, 650 mg, Oral, Q8H PRN, Rico Shabazz MD    dextrose 10% bolus 125 mL 125 mL, 12.5 g, Intravenous, PRN, Rashard Yap MD, Stopped at 05/15/23 2001    dextrose 10% bolus 250 mL 250 mL, 25 g, Intravenous, PRN, Rashard Yap MD    dextrose 40 % gel 15,000 mg, 15 g, Oral, PRN, Rashard Yap MD, 15,000 mg at 05/16/23 0724    dextrose 40 % gel 30,000 mg, 30 g, Oral, PRN, Rashard Yap MD    diazePAM tablet 10 mg, 10 mg, Oral, Q6H **FOLLOWED BY** [START ON 5/17/2023] diazePAM tablet 10 mg, 10 mg, Oral, Q8H **FOLLOWED BY** [START ON 5/18/2023] diazePAM tablet 10 mg, 10 mg, Oral, Q12H **FOLLOWED BY** [START ON 5/19/2023] diazePAM tablet 10 mg, 10 mg, Oral, Once, Rico Shabazz MD    folic acid tablet 1 mg, 1 mg, Oral, Daily, Rico Shabazz MD    glucagon (human recombinant) injection 1 mg, 1 mg, Intramuscular, PRN, Rico Shabazz MD    heparin (porcine) injection 5,000 Units, 5,000 Units, Subcutaneous, Q8H, King Wallace MD, 5,000 Units at 05/16/23 0502    insulin aspart U-100 pen 0-5 Units, 0-5 Units, Subcutaneous, QID (AC + HS) PRN, Rico Shabazz MD    lactated ringers infusion, , Intravenous, Continuous, King Wallace MD, Last Rate: 125 mL/hr at 05/16/23 0710, New Bag at 05/16/23 0710    levothyroxine tablet 50 mcg, 50 mcg, Oral, Before breakfast, Rico Shabazz MD, 50 mcg at 05/16/23 0553    LORazepam injection 2 mg, 2 mg, Intravenous, Q6H PRN, Aaron Anne DO, 2 mg at 05/16/23 0553    magnesium sulfate 2g in water 50mL IVPB (premix), 2 g, Intravenous, Once, Rico Shabazz MD    melatonin tablet 6 mg, 6 mg, Oral, Nightly PRN, Rico Shabazz MD    multivitamin tablet, 1 tablet, Oral, Daily, Rico Shabazz MD    naloxone 0.4 mg/mL injection 0.02 mg, 0.02 mg, Intravenous, PRN, Rico Shabazz MD    ondansetron injection 4 mg, 4 mg, Intravenous, Q6H PRN, King Wallace MD, 4 mg at  "05/16/23 0501    prochlorperazine injection Soln 2.5 mg, 2.5 mg, Intravenous, Q6H PRN, King Wallace MD    sodium chloride 0.9% flush 10 mL, 10 mL, Intravenous, Q12H PRN, Rico Shabazz MD    thiamine tablet 100 mg, 100 mg, Oral, Daily, Rico Shabazz MD    Facility-Administered Medications Ordered in Other Encounters:     albuterol sulfate nebulizer solution 2.5 mg, 2.5 mg, Nebulization, Once, Andrew Rodriguez MD    Allergies:  Lortab [hydrocodone-acetaminophen] and Promethazine    PSYCHOSOCIAL FACTORS:  Stressors (Biopsychosocial, Cultural and Environmental): marriage, mental health, physical health, substance use/addiction  Functioning Relationships: strained with spouse or significant others and alone & isolated    STRENGTHS AND LIABILITIES:   Strength: Patient is expressive/articulate.  Strength: Patient is accepting of treatment.  Liability: Patient lacks coping skills  Liability: Patient is in active addiction.    Additional Relevant History, As Applicable:       EXAMINATION:     /72 (BP Location: Right arm, Patient Position: Lying)   Pulse 92   Temp 98.3 °F (36.8 °C) (Oral)   Resp 18   Ht 5' 6" (1.676 m)   Wt 75.8 kg (167 lb 1.7 oz)   SpO2 (!) 92%   BMI 26.97 kg/m²     MENTAL STATUS EXAMINATION:  General Appearance: dressed in hospital garb, lying in bed, disheveled  Behavior: cooperative, under good behavioral control  Involuntary Movements and Motor Activity: +tremors, otherwise unremarkable  Gait and Station: unable to assess - patient lying down or seated  Speech and Language: conversational, spontaneous, speaks and understands English proficiently  Mood: "Shitty"  Affect: mood-congruent, dysthymic, tearful, anxious  Thought Process and Associations: linear and goal-directed, with no loosening of associations  Thought Content and Perceptions: no suicidal or homicidal ideation, no evidence of psychosis  Sensorium: alert and oriented, with clear sensorium, disoriented to date but " oriented to situation and location  Recent and Remote Memory: grossly intact, able to recall relevant and salient information from the recent and remote past  Attention and Concentration: attentive, not readily distractible, able to spell WORLD forwards and backwards  Fund of Knowledge: grossly intact, used appropriate vocabulary, no significant deficits noted, correctly identifies the   Insight: intact, demonstrates awareness of illness  Judgment: intact, behavior is adequate/appropriate given the circumstances      RISK MANAGEMENT:     The following risk parameters were assessed during this evaluation:    I[]I Y  I[x]I N  I[]I U  I[]I A  Suicidal Ideation/Behavior:   I[]I Y  I[x]I N  I[]I U  I[]I A  Homicidal Ideation/Behavior:   I[]I Y  I[x]I N  I[]I U  I[]I A  Violence:   I[]I Y  I[x]I N  I[]I U  I[]I A  Self-Injurious Behavior:     The patient is deemed to be a reliable and factually accurate historian.    I[]I Y  I[x]I N  I[]I U  I[]I A  I[]I N/A  Minimization of Risk Parameters Suspected/Evident:   I[]I Y  I[x]I N  I[]I U  I[]I A  I[]I N/A  Exaggeration of Risk Parameters Suspected/Evident:       [] Y  [x] N  Danger to Self:   [] Y  [x] N  Danger to Others:   [] Y  [x] N  Grave Disability:     The patient does not currently meet the criteria for psychiatric admission and can be safely and effectively managed in a less restrictive level of care.    In cases of emergency, daily coverage provided by Acute/ER Psych MD, NP, PA, or SW, with contact numbers located in [facility] On Call Schedule.    Tyson Acevedo MD  Department of Psychiatry  [facility]        KEY:     I[]I Y = Yes / Present / Endorses  I[]I N = No / Absent / Denies  I[]I U = Unknown / Unable to Assess / Unwilling to Participate  I[]I A = Ambiguity Exists / Accuracy Uncertain  I[]I D = Denial or Minimization is Suspected/Evident  I[]I N/A = Non-Applicable    CHART REVIEW:     Available  documentation has been reviewed, and pertinent elements of the chart have been incorporated into this evaluation where appropriate.    The patient's last Epic encounter in the psychiatry department was on: Visit date not found  The patient's first Epic encounter in the psychiatry department was on:      [place]/MS  [facility]  Site reviewed - No recent discrepancies or irregularities are noted.      ADVICE AND COUNSELING:     [x] In cases of emergencies (e.g. SI/HI resulting in danger to self or others, functioning deteriorates to the level of grave disability), call 911 or [phone], or present to the emergency department for immediate assistance.  [x] Patient should not operate a motor vehicle or heavy machinery if effects of medications or underlying symptoms/condition impair the ability to safely do so.    Alcohol, Tobacco, and Drug Counseling, as well as resources, has been provided, as warranted.     Shared medical decision making and informed consent are the hallmark and bedrock of good clinical care, and as such have been employed and obtained, respectively, to the degree possible.      Risk Mitigation Strategies, Harm Reduction Techniques, and Safety Netting are important interventions that can reduce acute and chronic risk, and as such have been employed to the degree possible.    Prescription Drug Management entails the review, recommendation, or consideration without recommendation of medications, and as such was employed during the encounter.    Additional Psychoeducation has been provided, as warranted.    Discussed, to the extent possible, diagnosis, risks and benefits of proposed treatment vs alternative treatments vs no treatment, potential side effects of these treatments and the inherent unpredictability of treatment. The patient's ability to understand, participate and engage in a conversation surrounding this was deemed to be: adequate.     Written material has been provided to supplement, augment, and  reinforce any discussions and interventions, via the AVS or other pre-printed handouts, as warranted.      DIAGNOSTIC TESTING:     The chart was reviewed for recent diagnostic procedures and investigations, and pertinent results are noted below.    Wt Readings from Last 2 Encounters:   05/15/23 75.8 kg (167 lb 1.7 oz)   04/26/23 78.3 kg (172 lb 9.9 oz)     BP Readings from Last 1 Encounters:   05/16/23 130/72     Pulse Readings from Last 1 Encounters:   05/16/23 92        Blood Counts, Electrolytes & Glucose: (i.e. WBC, ANC, Hemoglobin, Hematocrit, MCV, Platelets)  Lab Results   Component Value Date    WBC 5.93 05/16/2023    GRAN 1.2 (L) 05/16/2023    GRAN 20.7 (L) 05/16/2023    HGB 9.6 (L) 05/16/2023    HCT 30.8 (L) 05/16/2023    MCV 90 05/16/2023    PLT 66 (L) 05/16/2023     (L) 05/16/2023    K 4.5 05/16/2023    CALCIUM 8.3 (L) 05/16/2023    PHOS 2.1 (L) 05/16/2023    MG 1.4 (L) 05/16/2023    CO2 24 05/16/2023    ANIONGAP 14 05/16/2023    GLU 72 05/16/2023    HGBA1C 5.1 03/26/2023       Renal, Liver, Pancreas, Thyroid, Parathyroid, Prolactin, CPK, Lipids & Vitamin Levels: (i.e. Cr, BUN, Anion Gap, GFR, Urine Specific Gravity, Urine Protein, Microalburnin, AST, ALT, GGT, Alk Phos,Total Bili, Total Protein, Albumin, Ammonia, INR, Amylase, Lipase, TSH, Total T3, Total T4, Free T4 PTH, Prolactin, CPK, Cholesterol, Triglycerides, LDH, HDL, Vitamin B12, Folate, Vitamin D)  Lab Results   Component Value Date    CREATININE 1.2 05/16/2023    BUN 24 (H) 05/16/2023    EGFRNORACEVR 50.5 (A) 05/16/2023    SPECGRAV 1.015 05/15/2023    PROTEINUA Trace (A) 05/15/2023    AST 30 05/16/2023    ALT 16 05/16/2023     (H) 04/01/2011    ALKPHOS 39 (L) 05/16/2023    BILITOT 0.7 05/16/2023    LABPROT 10.2 05/15/2023    ALBUMIN 3.6 05/16/2023    AMMONIA 29 04/06/2023    INR 0.9 05/15/2023    AMYLASE 19 (L) 10/14/2014    LIPASE 16 05/15/2023    TSH 0.820 12/24/2022    FREET4 0.90 04/18/2022    PTH 45 10/15/2014    CPK 25  04/01/2023    CHOL 184 04/06/2023    TRIG 161 (H) 04/06/2023    LDLCALC 107.8 04/06/2023    HDL 44 04/06/2023    AMFDFTCB00 542 04/06/2023    FOLATE 13.3 04/06/2023    THIAMINEBLOO 141 (H) 04/26/2023    PESIPINL65UO 24 (L) 10/15/2014       Infection Diseases, Pregnancy Screenings & Drug Levels: (i.e. Hepatitis Panel, HIV, Syphilis, Urine & Blood Pregnancy Screens, beta hCG, Lithium, Valproic Acid, Carbamazepine, Lamotrigine, Phenytoin, Phenobarbital, Clozapine, Norclozapine, Clozapine + Norclozapine)   Lab Results   Component Value Date    HEPAIGM Negative 07/26/2017    HEPBIGM Positive (A) 07/26/2017    HEPCAB Non-reactive 03/10/2023    ZNP12CLPP Non-reactive 03/10/2023    HCGQUANT <2.0 05/09/2006    LITHIUM <0.1 (L) 09/29/2016       Addiction: (i.e. Urine Toxicology, Blood Alcohol, PETH, EtG, EtS, CDT, Buprenorphine, Norbuprenorphine)  Lab Results   Component Value Date    PCDSOALCOHOL 14 (A) 05/15/2023    PCDSOBENZOD Presumptive Positive (A) 05/15/2023    BARBITURATES Negative 05/15/2023    PCDSCOMETHA Negative 05/15/2023    OPIATESCREEN Negative 05/15/2023    COCAINEMETAB Negative 05/15/2023    AMPHETAMINES Negative 05/15/2023    MARIJUANATHC Negative 05/15/2023    PCDSOPHENCYN Negative 05/15/2023    [id number] 1092 09/27/2022    ALCOHOLMEDIC 86 (H) 04/25/2023       Results for orders placed or performed during the hospital encounter of 05/15/23   EKG 12-lead    Collection Time: 05/15/23 11:15 AM    Narrative    Test Reason : R11.0,    Vent. Rate : 096 BPM     Atrial Rate : 096 BPM     P-R Int : 144 ms          QRS Dur : 094 ms      QT Int : 352 ms       P-R-T Axes : 085 081 069 degrees     QTc Int : 444 ms    Normal sinus rhythm  Normal ECG  When compared with ECG of 25-APR-2023 22:17,  No significant change was found  Confirmed by Luis Daniel BECERRA, Breanne ([id number]) on 5/15/2023 3:21:24 PM    Referred By: [email or web address]   SELF           Confirmed By:Breanne Cary MD       Results for orders placed or performed during the  hospital encounter of 05/15/23   CT Head Without Contrast    Narrative    EXAMINATION:  CT HEAD WITHOUT CONTRAST    CLINICAL HISTORY:  Head trauma, moderate-severe;    TECHNIQUE:  Low dose axial images were obtained through the head.  Coronal and sagittal reformations were also performed. Contrast was not administered.    COMPARISON:  04/01/2023    FINDINGS:  No evidence of hydrocephalus, mass effect, intracranial hemorrhage or acute territorial infarct.  Mild stable volume loss.  The brain parenchyma maintains normal attenuation.    The calvarium is intact. The visualized sinuses and mastoid air cells are clear.      Impression    No acute intracranial hemorrhage/injury.      Electronically signed by: Tyson Bob  Date:    05/15/2023  Time:    14:43   Results for orders placed or performed during the hospital encounter of 06/10/22   MRI Brain Without Contrast    Narrative    EXAMINATION:  MRI BRAIN WITHOUT CONTRAST    CLINICAL HISTORY:  hx of PRES;    TECHNIQUE:  Multiplanar multisequence MR imaging of the brain was performed without intravenous contrast.    COMPARISON:  Head CT 06/10/2022, brain MRI 10/04/2017, 07/21/2017    FINDINGS:  Intracranial Compartment:    Ventricles are normal in size for age without evidence of hydrocephalus.    Ill-defined focus T2-FLAIR signal hyperintensity in the right periatrial white matter.  Few additional scattered punctate foci elsewhere within the supratentorial white matter.  These appear similar to the prior study of 10/04/2017.  No definite new focal lesions.  No diffusion restriction to indicate an acute infarction.  No remote major vascular distribution infarct.  No recent or remote hemorrhage.  No mass effect or midline shift.    No extra-axial blood or fluid collections.    Normal vascular flow voids are preserved.    Skull/Extracranial Contents (limited evaluation):    Bone marrow signal intensity is normal.      Impression    No evidence of acute intracranial  pathology.      Electronically signed by: Miguel Malagon MD  Date:    06/10/2022  Time:    09:45       CONSULTATION:     A diagnostic psychiatric evaluation was performed and responsiveness to treatment was assessed.  The patient demonstrates adequate ability/capacity to respond to treatment.    Inpatient consult to Psychiatry  Consult performed by: Tyson Acevedo MD  Consult ordered by: Rico Shabazz MD        - The case was discussed and care was coordinated with the treatment team, including clinical impression, assessment, and treatment recommendations.

## 2023-05-16 NOTE — PLAN OF CARE
[facility] - Med Surg  Initial Discharge Assessment       Primary Care Provider: Andrew Rodriguez MD    Admission Diagnosis: Nausea [R11.0]  DEVANTE (acute kidney injury) [N17.9]  Chest pain [R07.9]    Admission Date: 5/15/2023  Expected Discharge Date: 5/18/2023    Transition of Care Barriers: Substance Abuse, Does not adhere to care plan    Payor: [place] [employer] / Plan: [facility] ALL SAVERS / Product Type: Commercial /     Extended Emergency Contact Information  Primary Emergency Contact: Henrique Abdul  Address: [address] [place] [address]            [place] [address] [facility]  Home Phone: [phone]  Relation: Spouse  Secondary Emergency Contact: Carlos Suazo  Mobile Phone: [phone]  Relation: Son    Discharge Plan A: Home, Home with family  Discharge Plan B: Home, Other (Referrals for Substance Abuse Treatment.)      [id number] - [place], [place] - [address] [facility]  [address] [facility] [address]  Phone: [phone] Fax: [phone]      Initial Assessment (most recent)       Adult Discharge Assessment - 05/16/23 1140          Discharge Assessment    Assessment Type Discharge Planning Assessment     Confirmed/corrected address, phone number and insurance Yes     Confirmed Demographics Correct on Facesheet     Source of Information patient     Reason For Admission Alcoholic Ketoacidosis     People in Home spouse     Facility Arrived From: N/A     Do you expect to return to your current living situation? Yes     Do you have help at home or someone to help you manage your care at home? Yes     Who are your caregiver(s) and their phone number(s)? Henrique [email or web address]-[phone]     Current cognitive status: Alert/Oriented     Equipment Currently Used at Home CPAP     Readmission within 30 days? Yes     Patient currently being followed by outpatient case management? No     Do you currently have service(s) that help you manage your care at home? No      Do you take prescription medications? Yes     Do you have prescription coverage? Yes     Coverage [facility] All Savers     Do you have any problems affording any of your prescribed medications? No     Is the patient taking medications as prescribed? yes     Who is going to help you get home at discharge? Pt's family will provide transportation home.     How do you get to doctors appointments? family or friend will provide;car, drives self     Are you on dialysis? No     Do you take coumadin? No     Discharge Plan A Home;Home with family     Discharge Plan B Home;Other   Referrals for Substance Abuse Treatment.    DME Needed Upon Discharge  none     Transition of Care Barriers Substance Abuse;Does not adhere to care plan        OTHER    Name(s) of People in Home Henrique Abdul-                      SW completed assessment.  Hospital readmissions on file.  Pt's family will provide transportation.     Pt lives with her .  Pt independent with ADLs and mobility.  Pt has family support at home.  Pt does not receive coumadin or dialysis.  Pt was with [facility] but discharged on 5/3/2023.  Pt refused  services on several occassions.      Pt reports issues with alcohol.  DALILA will follow up with Substance abuse resources.      Disp: Home w/ Substance abuse resources.      Marlena Mckinney LMSW  PRN-  [facility]  Ext. [phone]

## 2023-05-16 NOTE — SUBJECTIVE & OBJECTIVE
Interval History: NAEON.  Required IV Ativan.  Patient with tremors and mild nausea.  Started on Valium taper.  WBC down to 5.9 from 20.1.  We will start antibiotics for UTI.    Review of Systems   Constitutional:  Positive for appetite change and chills. Negative for fatigue and fever.   HENT:  Negative for congestion and sinus pain.    Respiratory:  Negative for cough, shortness of breath and wheezing.    Cardiovascular:  Negative for chest pain, palpitations and leg swelling.   Gastrointestinal:  Positive for nausea and vomiting. Negative for abdominal distention, abdominal pain, blood in stool and diarrhea.   Genitourinary:  Negative for difficulty urinating and urgency.   Musculoskeletal:  Negative for back pain, myalgias and neck pain.   Skin:  Negative for rash and wound.   Neurological:  Positive for light-headedness. Negative for dizziness, syncope and headaches.   Hematological:  Bruises/bleeds easily.   Psychiatric/Behavioral:  Negative for agitation and behavioral problems. The patient is nervous/anxious.      Objective:     Vital Signs (Most Recent):  Temp: 98.3 °F (36.8 °C) (05/16/23 1424)  Pulse: 86 (05/16/23 1038)  Resp: 18 (05/16/23 1030)  BP: 134/60 (05/16/23 1030)  SpO2: (!) 91 % (05/16/23 1038) Vital Signs (24h Range):  Temp:  [98.3 °F (36.8 °C)-98.6 °F (37 °C)] 98.3 °F (36.8 °C)  Pulse:  [] 86  Resp:  [17-21] 18  SpO2:  [86 %-98 %] 91 %  BP: (115-144)/(57-72) 134/60     Weight: 75.8 kg (167 lb 1.7 oz)  Body mass index is 26.97 kg/m².    Intake/Output Summary (Last 24 hours) at 5/16/2023 1604  Last data filed at 5/15/2023 2001  Gross per 24 hour   Intake 125 ml   Output --   Net 125 ml         Physical Exam  Vitals and nursing note reviewed.   Constitutional:       Comments: Pt shivering and uncomfortable in bed   HENT:      Head: Contusion and left periorbital erythema present.      Comments: Bruising present around L eye   Eyes:      General: No scleral icterus.  Cardiovascular:      Rate  and Rhythm: Regular rhythm. Tachycardia present.   Pulmonary:      Effort: Pulmonary effort is normal. No respiratory distress.      Breath sounds: No wheezing.   Abdominal:      General: Abdomen is flat. Bowel sounds are normal. There is no distension.      Tenderness: There is no abdominal tenderness.   Musculoskeletal:      Cervical back: Normal range of motion. No rigidity.      Right lower leg: No edema.      Left lower leg: No edema.   Skin:     Findings: Bruising present.   Neurological:      Mental Status: She is alert and oriented to person, place, and time.      Comments: No asterixis present   Psychiatric:         Attention and Perception: Attention normal.         Mood and Affect: Mood is anxious and depressed.         Speech: Speech normal.         Thought Content: Thought content normal.         Cognition and Memory: Cognition normal.         Judgment: Judgment normal.            Significant Labs: All pertinent labs within the past 24 hours have been reviewed.    Significant Imaging: I have reviewed all pertinent imaging results/findings within the past 24 hours.

## 2023-05-16 NOTE — PROGRESS NOTES
[facility] Medicine  Progress Note    Patient Name: Earl Abdul  MRN: [id number]  Patient Class: IP- Inpatient   Admission Date: 5/15/2023  Length of Stay: 1 days  Attending Physician: Rashard Yap MD  Primary Care Provider: Andrew Rodriguez MD    Subjective:     Principal Problem:Alcoholic ketoacidosis    HPI:  62 year old female with medical history significant for  EtOH use disorder with history of prior seizures, depression with suicide attempt in 2017, breast cancer, HTN, HLD, fibromyalgia, sarcoidosis, hypothyroidism, T2DM, CLL (not on treatment) and COPD presenting with complaints of nausea and vomiting x 2 days associated with generalized weakness. She reports the last time she had a meal was 1 week ago but she has been drinking EtOH very heavily. She reports drinking 0.5 bottles of vodka daily and her last drink was this morning. Per EMS, her BG was reportedly 55 prior to arrival. She states that she has had diarrhea as well. She has had alcohol withdrawals including seizures before. She was recently admitted 4/25-4/20 for the same presentation of nausea and vomiting in setting of 2 weeks of heavy alcohol use. She was initaited on CIWA and treated with valium taper, no seizures during that admission. 3 weeks prior to that she was admitted for acute on chronic respiratory failure due to COPD with non-adherance to home inhalers. She also had a mechanical fall 2 days ago in which she hurt her eye lid. Denies fevers, chest pain, hematemesis or bleeding per rectum.     [place] [facility] she is AF, , /96, on 4L NC. WBC 20.11 (baseline tends to fluctuate between leukopenia and leukocytosis). Hgb 11.1, Plt 149, both improved from baseline. Na 131, K 5.1, Cl 90, HCO3 15, BUN 34, Cr 2.2 (bl 0.7). Glucose 46 on arrival, which improved to 234 with administration of D10. Lactate 2.5. CXR without acute cardiopulmonary process.         Overview/Hospital Course:  Patient admitted  with Alcoholic ketoacidosis and concerns for Alcohol withdrawal. Given IV valium and required IV ativan overnight. Started on valium taper. Seen by addiction psychiatry. DEVANTE improved with fluid resuscitation.       Interval History: NAEON.  Required IV Ativan.  Patient with tremors and mild nausea.  Started on Valium taper.  WBC down to 5.9 from 20.1.  We will start antibiotics for UTI.    Review of Systems   Constitutional:  Positive for appetite change and chills. Negative for fatigue and fever.   HENT:  Negative for congestion and sinus pain.    Respiratory:  Negative for cough, shortness of breath and wheezing.    Cardiovascular:  Negative for chest pain, palpitations and leg swelling.   Gastrointestinal:  Positive for nausea and vomiting. Negative for abdominal distention, abdominal pain, blood in stool and diarrhea.   Genitourinary:  Negative for difficulty urinating and urgency.   Musculoskeletal:  Negative for back pain, myalgias and neck pain.   Skin:  Negative for rash and wound.   Neurological:  Positive for light-headedness. Negative for dizziness, syncope and headaches.   Hematological:  Bruises/bleeds easily.   Psychiatric/Behavioral:  Negative for agitation and behavioral problems. The patient is nervous/anxious.      Objective:     Vital Signs (Most Recent):  Temp: 98.3 °F (36.8 °C) (05/16/23 1424)  Pulse: 86 (05/16/23 1038)  Resp: 18 (05/16/23 1030)  BP: 134/60 (05/16/23 1030)  SpO2: (!) 91 % (05/16/23 1038) Vital Signs (24h Range):  Temp:  [98.3 °F (36.8 °C)-98.6 °F (37 °C)] 98.3 °F (36.8 °C)  Pulse:  [] 86  Resp:  [17-21] 18  SpO2:  [86 %-98 %] 91 %  BP: (115-144)/(57-72) 134/60     Weight: 75.8 kg (167 lb 1.7 oz)  Body mass index is 26.97 kg/m².    Intake/Output Summary (Last 24 hours) at 5/16/2023 1600  Last data filed at 5/15/2023 2001  Gross per 24 hour   Intake 125 ml   Output --   Net 125 ml         Physical Exam  Vitals and nursing note reviewed.   Constitutional:       Comments: Pt  "shivering and uncomfortable in bed   HENT:      Head: Contusion and left periorbital erythema present.      Comments: Bruising present around L eye   Eyes:      General: No scleral icterus.  Cardiovascular:      Rate and Rhythm: Regular rhythm. Tachycardia present.   Pulmonary:      Effort: Pulmonary effort is normal. No respiratory distress.      Breath sounds: No wheezing.   Abdominal:      General: Abdomen is flat. Bowel sounds are normal. There is no distension.      Tenderness: There is no abdominal tenderness.   Musculoskeletal:      Cervical back: Normal range of motion. No rigidity.      Right lower leg: No edema.      Left lower leg: No edema.   Skin:     Findings: Bruising present.   Neurological:      Mental Status: She is alert and oriented to person, place, and time.      Comments: No asterixis present   Psychiatric:         Attention and Perception: Attention normal.         Mood and Affect: Mood is anxious and depressed.         Speech: Speech normal.         Thought Content: Thought content normal.         Cognition and Memory: Cognition normal.         Judgment: Judgment normal.            Significant Labs: All pertinent labs within the past 24 hours have been reviewed.    Significant Imaging: I have reviewed all pertinent imaging results/findings within the past 24 hours.      Assessment/Plan:      * Alcoholic ketoacidosis  62-year-old female with significant alcohol use history and prior alcohol withdrawal seizures presenting with nausea and vomiting in the setting of heavy alcohol use and noted to have metabolic acidosis on presentation. Mild tremors noted. Given Valium 1x in the ED. Denies hematemesis or melena.    - START Valium taper  - CIWA protocol  - IV Ativan PRN for CIWA > 8  - F/U UDS, serum Ethanol, b-hydroxybutarate, PETH  - Thiamine, multivitamin  - IVF resuscitaion      Alcohol use disorder, severe, dependence  See "Alcoholic Ketoacidosis" Has attempted Rehab in the past. She is " concerned her EtOH use is affecting her marriage.     - Addiction Psychiatry consulted; appreciate recommendations    CLL (chronic lymphocytic leukemia)  Established with Dr. Gonzalez. Not currently on treatment. Heme/onc appointment scheduled 5/17    - Outpatient Heme-Onc follow up      Malignant neoplasm of central portion of right breast in female, estrogen receptor positive  T1b N0 stage IA breast cancer diagnosed October 2020. S/p bilateral mastectomy with no adjuvant therapy; received Letrozole February 2021-may 2021        DEVANTE (acute kidney injury)  Patient with acute kidney injury likely due to IVVD/dehydration DEVANTE is currently stable. Labs reviewed- Renal function/electrolytes with Estimated Creatinine Clearance: 49.3 mL/min (based on SCr of 1.2 mg/dL). according to latest data. Monitor urine output and serial BMP and adjust therapy as needed. Avoid nephrotoxins and renally dose meds for GFR listed above.     - Creatinine 2.2 on admit, baseline around 0.6    Plan:   Lab Results   Component Value Date    CREATININE 1.2 05/16/2023     - Check urine lytes and UPCR  - IVF  - Avoid nephrotoxic agents such as NSAIDs, gadolinium and IV radiocontrast.  - Renally dose meds to current GFR.  - Maintain MAP > 65.    IMPROVED        Essential hypertension  Home regimen: lisinopril 20mg    - HOLD in the setting of current normotension and DEVANTE    Fibromyalgia  - Continue home cymbalta given DEVANTE      Hypomagnesemia  Mg 1.3 on presentation    - Replete PRN       Hyperlipidemia  Lipid panel 4/6/2023: , HDL 44, ,     - Not currently on treatment         Hypothyroidism  TSH 0.8 12/2022    - Continue home synthroid    Type 2 diabetes mellitus with hyperglycemia  Patient's FSGs are uncontrolled due to hypoglycemia on current medication regimen.  Last A1c reviewed-   Lab Results   Component Value Date    HGBA1C 5.1 03/26/2023     Most recent fingerstick glucose reviewed-   Recent Labs   Lab 05/16/23  0713  05/16/23  0747 05/16/23  1052 05/16/23  1539   POCTGLUCOSE 67* 128* 132* 133*     Current correctional scale  Low  Maintain anti-hyperglycemic dose as follows-   Antihyperglycemics (From admission, onward)    Start     Stop Route Frequency Ordered    05/15/23 1414  insulin aspart U-100 pen 0-5 Units         -- SubQ Before meals & nightly PRN 05/15/23 1321        Hold Oral hypoglycemics while patient is in the hospital. Home regimen: metformin 1000mg BID    - Initially hypoglycemic with improvement S/P D10 administration  - POCT    COPD (chronic obstructive pulmonary disease)  Home regimen: Breo, combivent      Depression  Patient has persistent depression which is unknown and is currently controlled. Will Continue anti-depressant medications. We will not consult psychiatry at this time. Patient does not display psychosis at this time. Continue to monitor closely and adjust plan of care as needed.    - HOLD home cymbalta given DEVANTE      Obesity (BMI 30.0-34.9)  General weight loss/lifestyle modification strategies discussed (elicit support from others; identify saboteurs; non-food rewards, etc).        Sarcoidosis  Patient with documented history of sarcoidosis.  Not currently on treatment.      Alcohol withdrawal syndrome with complication  Status post IV Valium x 2 and Ativan    - started on Valium taper  - supportive care PRN      VTE Risk Mitigation (From admission, onward)         Ordered     heparin (porcine) injection 5,000 Units  Every 8 hours         05/15/23 1518     IP VTE HIGH RISK PATIENT  Once         05/15/23 1518     Place sequential compression device  Until discontinued         05/15/23 1320                Discharge Planning   BECCA: 5/18/2023     Code Status: Full Code   Is the patient medically ready for discharge?: No    Reason for patient still in hospital (select all that apply): Patient trending condition  Discharge Plan A: Home, Home with family          King Wallace MD  Internal Medicine,  PGY-1  [facility]

## 2023-05-17 DIAGNOSIS — E11.9 TYPE 2 DIABETES MELLITUS WITHOUT COMPLICATION: ICD-10-CM

## 2023-05-17 LAB
ALBUMIN SERPL BCP-MCNC: 3.6 G/DL (ref 3.5–5.2)
ALP SERPL-CCNC: 39 U/L (ref 55–135)
ALT SERPL W/O P-5'-P-CCNC: 17 U/L (ref 10–44)
ANION GAP SERPL CALC-SCNC: 10 MMOL/L (ref 8–16)
ANION GAP SERPL CALC-SCNC: 11 MMOL/L (ref 8–16)
AST SERPL-CCNC: 30 U/L (ref 10–40)
BACTERIA UR CULT: ABNORMAL
BASOPHILS # BLD AUTO: 0.03 K/UL (ref 0–0.2)
BASOPHILS NFR BLD: 0.6 % (ref 0–1.9)
BILIRUB SERPL-MCNC: 0.4 MG/DL (ref 0.1–1)
BUN SERPL-MCNC: 6 MG/DL (ref 8–23)
BUN SERPL-MCNC: 9 MG/DL (ref 8–23)
CALCIUM SERPL-MCNC: 8.2 MG/DL (ref 8.7–10.5)
CALCIUM SERPL-MCNC: 8.4 MG/DL (ref 8.7–10.5)
CHLORIDE SERPL-SCNC: 101 MMOL/L (ref 95–110)
CHLORIDE SERPL-SCNC: 99 MMOL/L (ref 95–110)
CO2 SERPL-SCNC: 25 MMOL/L (ref 23–29)
CO2 SERPL-SCNC: 29 MMOL/L (ref 23–29)
CREAT SERPL-MCNC: 0.8 MG/DL (ref 0.5–1.4)
CREAT SERPL-MCNC: 0.8 MG/DL (ref 0.5–1.4)
DIFFERENTIAL METHOD: ABNORMAL
EOSINOPHIL # BLD AUTO: 0.1 K/UL (ref 0–0.5)
EOSINOPHIL NFR BLD: 1 % (ref 0–8)
ERYTHROCYTE [DISTWIDTH] IN BLOOD BY AUTOMATED COUNT: 17.6 % (ref 11.5–14.5)
EST. GFR  (NO RACE VARIABLE): >60 ML/MIN/1.73 M^2
EST. GFR  (NO RACE VARIABLE): >60 ML/MIN/1.73 M^2
GLUCOSE SERPL-MCNC: 100 MG/DL (ref 70–110)
GLUCOSE SERPL-MCNC: 147 MG/DL (ref 70–110)
HCT VFR BLD AUTO: 35.6 % (ref 37–48.5)
HGB BLD-MCNC: 10.8 G/DL (ref 12–16)
IMM GRANULOCYTES # BLD AUTO: 0.08 K/UL (ref 0–0.04)
IMM GRANULOCYTES NFR BLD AUTO: 1.7 % (ref 0–0.5)
LYMPHOCYTES # BLD AUTO: 3.3 K/UL (ref 1–4.8)
LYMPHOCYTES NFR BLD: 68.9 % (ref 18–48)
MAGNESIUM SERPL-MCNC: 1.3 MG/DL (ref 1.6–2.6)
MAGNESIUM SERPL-MCNC: 1.8 MG/DL (ref 1.6–2.6)
MCH RBC QN AUTO: 28.1 PG (ref 27–31)
MCHC RBC AUTO-ENTMCNC: 30.3 G/DL (ref 32–36)
MCV RBC AUTO: 93 FL (ref 82–98)
MONOCYTES # BLD AUTO: 0.3 K/UL (ref 0.3–1)
MONOCYTES NFR BLD: 6.5 % (ref 4–15)
NEUTROPHILS # BLD AUTO: 1 K/UL (ref 1.8–7.7)
NEUTROPHILS NFR BLD: 21.3 % (ref 38–73)
NRBC BLD-RTO: 0 /100 WBC
PHOSPHATE SERPL-MCNC: 1.9 MG/DL (ref 2.7–4.5)
PHOSPHATE SERPL-MCNC: 3 MG/DL (ref 2.7–4.5)
PLATELET # BLD AUTO: 70 K/UL (ref 150–450)
PMV BLD AUTO: 11.8 FL (ref 9.2–12.9)
POCT GLUCOSE: 118 MG/DL (ref 70–110)
POCT GLUCOSE: 119 MG/DL (ref 70–110)
POCT GLUCOSE: 149 MG/DL (ref 70–110)
POCT GLUCOSE: 173 MG/DL (ref 70–110)
POTASSIUM SERPL-SCNC: 4.3 MMOL/L (ref 3.5–5.1)
POTASSIUM SERPL-SCNC: 4.7 MMOL/L (ref 3.5–5.1)
PROT SERPL-MCNC: 5.9 G/DL (ref 6–8.4)
RBC # BLD AUTO: 3.85 M/UL (ref 4–5.4)
SODIUM SERPL-SCNC: 137 MMOL/L (ref 136–145)
SODIUM SERPL-SCNC: 138 MMOL/L (ref 136–145)
WBC # BLD AUTO: 4.79 K/UL (ref 3.9–12.7)

## 2023-05-17 PROCEDURE — 99232 SBSQ HOSP IP/OBS MODERATE 35: CPT | Mod: ,,, | Performed by: PSYCHIATRY & NEUROLOGY

## 2023-05-17 PROCEDURE — 80048 BASIC METABOLIC PNL TOTAL CA: CPT | Mod: XB

## 2023-05-17 PROCEDURE — 63600175 PHARM REV CODE 636 W HCPCS

## 2023-05-17 PROCEDURE — 80053 COMPREHEN METABOLIC PANEL: CPT

## 2023-05-17 PROCEDURE — 99232 PR SUBSEQUENT HOSPITAL CARE,LEVL II: ICD-10-PCS | Mod: ,,, | Performed by: PSYCHIATRY & NEUROLOGY

## 2023-05-17 PROCEDURE — 94761 N-INVAS EAR/PLS OXIMETRY MLT: CPT

## 2023-05-17 PROCEDURE — 25000003 PHARM REV CODE 250

## 2023-05-17 PROCEDURE — 99233 SBSQ HOSP IP/OBS HIGH 50: CPT | Mod: ,,, | Performed by: STUDENT IN AN ORGANIZED HEALTH CARE EDUCATION/TRAINING PROGRAM

## 2023-05-17 PROCEDURE — 83735 ASSAY OF MAGNESIUM: CPT

## 2023-05-17 PROCEDURE — 83735 ASSAY OF MAGNESIUM: CPT | Mod: 91

## 2023-05-17 PROCEDURE — 11000001 HC ACUTE MED/SURG PRIVATE ROOM

## 2023-05-17 PROCEDURE — 36415 COLL VENOUS BLD VENIPUNCTURE: CPT

## 2023-05-17 PROCEDURE — 84100 ASSAY OF PHOSPHORUS: CPT | Mod: 91

## 2023-05-17 PROCEDURE — 84100 ASSAY OF PHOSPHORUS: CPT

## 2023-05-17 PROCEDURE — 99233 PR SUBSEQUENT HOSPITAL CARE,LEVL III: ICD-10-PCS | Mod: ,,, | Performed by: STUDENT IN AN ORGANIZED HEALTH CARE EDUCATION/TRAINING PROGRAM

## 2023-05-17 PROCEDURE — 85025 COMPLETE CBC W/AUTO DIFF WBC: CPT

## 2023-05-17 RX ORDER — MAGNESIUM SULFATE HEPTAHYDRATE 40 MG/ML
2 INJECTION, SOLUTION INTRAVENOUS ONCE
Status: COMPLETED | OUTPATIENT
Start: 2023-05-17 | End: 2023-05-17

## 2023-05-17 RX ORDER — TRAZODONE HYDROCHLORIDE 50 MG/1
50 TABLET ORAL NIGHTLY PRN
Status: DISCONTINUED | OUTPATIENT
Start: 2023-05-17 | End: 2023-05-20

## 2023-05-17 RX ORDER — DIAZEPAM 5 MG/1
10 TABLET ORAL EVERY 6 HOURS
Status: DISCONTINUED | OUTPATIENT
Start: 2023-05-17 | End: 2023-05-18

## 2023-05-17 RX ORDER — LOPERAMIDE HYDROCHLORIDE 2 MG/1
2 CAPSULE ORAL 4 TIMES DAILY PRN
Status: DISCONTINUED | OUTPATIENT
Start: 2023-05-17 | End: 2023-05-21 | Stop reason: HOSPADM

## 2023-05-17 RX ORDER — SODIUM CHLORIDE, SODIUM LACTATE, POTASSIUM CHLORIDE, CALCIUM CHLORIDE 600; 310; 30; 20 MG/100ML; MG/100ML; MG/100ML; MG/100ML
INJECTION, SOLUTION INTRAVENOUS CONTINUOUS
Status: ACTIVE | OUTPATIENT
Start: 2023-05-17 | End: 2023-05-18

## 2023-05-17 RX ADMIN — LEVOTHYROXINE SODIUM 50 MCG: 50 TABLET ORAL at 05:05

## 2023-05-17 RX ADMIN — DIAZEPAM 10 MG: 5 TABLET ORAL at 05:05

## 2023-05-17 RX ADMIN — Medication 6 MG: at 10:05

## 2023-05-17 RX ADMIN — HEPARIN SODIUM 5000 UNITS: 5000 INJECTION INTRAVENOUS; SUBCUTANEOUS at 02:05

## 2023-05-17 RX ADMIN — SODIUM CHLORIDE, POTASSIUM CHLORIDE, SODIUM LACTATE AND CALCIUM CHLORIDE: 600; 310; 30; 20 INJECTION, SOLUTION INTRAVENOUS at 05:05

## 2023-05-17 RX ADMIN — TRAZODONE HYDROCHLORIDE 50 MG: 50 TABLET ORAL at 10:05

## 2023-05-17 RX ADMIN — DIAZEPAM 10 MG: 5 TABLET ORAL at 01:05

## 2023-05-17 RX ADMIN — HEPARIN SODIUM 5000 UNITS: 5000 INJECTION INTRAVENOUS; SUBCUTANEOUS at 05:05

## 2023-05-17 RX ADMIN — DULOXETINE 90 MG: 30 CAPSULE, DELAYED RELEASE ORAL at 11:05

## 2023-05-17 RX ADMIN — CEFTRIAXONE 1 G: 1 INJECTION, POWDER, FOR SOLUTION INTRAMUSCULAR; INTRAVENOUS at 01:05

## 2023-05-17 RX ADMIN — DIAZEPAM 10 MG: 5 TABLET ORAL at 11:05

## 2023-05-17 RX ADMIN — MAGNESIUM SULFATE 2 G: 2 INJECTION INTRAVENOUS at 11:05

## 2023-05-17 RX ADMIN — MAGNESIUM SULFATE 2 G: 2 INJECTION INTRAVENOUS at 06:05

## 2023-05-17 RX ADMIN — FOLIC ACID 1 MG: 1 TABLET ORAL at 11:05

## 2023-05-17 RX ADMIN — SODIUM CHLORIDE, POTASSIUM CHLORIDE, SODIUM LACTATE AND CALCIUM CHLORIDE: 600; 310; 30; 20 INJECTION, SOLUTION INTRAVENOUS at 06:05

## 2023-05-17 RX ADMIN — SODIUM PHOSPHATE, MONOBASIC, MONOHYDRATE AND SODIUM PHOSPHATE, DIBASIC, ANHYDROUS 20.01 MMOL: 142; 276 INJECTION, SOLUTION INTRAVENOUS at 02:05

## 2023-05-17 RX ADMIN — LOPERAMIDE HYDROCHLORIDE 2 MG: 2 CAPSULE ORAL at 01:05

## 2023-05-17 RX ADMIN — THIAMINE HCL TAB 100 MG 100 MG: 100 TAB at 11:05

## 2023-05-17 RX ADMIN — ONDANSETRON 4 MG: 2 INJECTION INTRAMUSCULAR; INTRAVENOUS at 09:05

## 2023-05-17 RX ADMIN — ONDANSETRON 4 MG: 2 INJECTION INTRAMUSCULAR; INTRAVENOUS at 02:05

## 2023-05-17 RX ADMIN — LORAZEPAM 2 MG: 2 INJECTION INTRAMUSCULAR; INTRAVENOUS at 06:05

## 2023-05-17 RX ADMIN — THERA TABS 1 TABLET: TAB at 11:05

## 2023-05-17 RX ADMIN — HEPARIN SODIUM 5000 UNITS: 5000 INJECTION INTRAVENOUS; SUBCUTANEOUS at 10:05

## 2023-05-17 NOTE — PROGRESS NOTES
[facility]  Progress Note    Patient Name: Earl Abdul  MRN: [id number]  Patient Class: IP- Inpatient   Admission Date: 5/15/2023  Length of Stay: 2 days  Attending Physician: Rashard Yap MD  Primary Care Provider: Andrew Rodriguez MD    Subjective:     Principal Problem:Alcoholic ketoacidosis    HPI:  62 year old female with medical history significant for  EtOH use disorder with history of prior seizures, depression with suicide attempt in 2017, breast cancer, HTN, HLD, fibromyalgia, sarcoidosis, hypothyroidism, T2DM, CLL (not on treatment) and COPD presenting with complaints of nausea and vomiting x 2 days associated with generalized weakness. She reports the last time she had a meal was 1 week ago but she has been drinking EtOH very heavily. She reports drinking 0.5 bottles of vodka daily and her last drink was this morning. Per EMS, her BG was reportedly 55 prior to arrival. She states that she has had diarrhea as well. She has had alcohol withdrawals including seizures before. She was recently admitted 4/25-4/20 for the same presentation of nausea and vomiting in setting of 2 weeks of heavy alcohol use. She was initaited on CIWA and treated with valium taper, no seizures during that admission. 3 weeks prior to that she was admitted for acute on chronic respiratory failure due to COPD with non-adherance to home inhalers. She also had a mechanical fall 2 days ago in which she hurt her eye lid. Denies fevers, chest pain, hematemesis or bleeding per rectum.     [place] [facility] [place], , /96, on 4L NC. WBC 20.11 (baseline tends to fluctuate between leukopenia and leukocytosis). Hgb 11.1, Plt 149, both improved from baseline. Na 131, K 5.1, Cl 90, HCO3 15, BUN 34, Cr 2.2 (bl 0.7). Glucose 46 on arrival, which improved to 234 with administration of D10. Lactate 2.5. CXR without acute cardiopulmonary process.         Overview/Hospital Course:  Patient admitted  with Alcoholic ketoacidosis and concerns for Alcohol withdrawal. Given IV valium and required IV ativan overnight. Started on valium taper. Seen by addiction psychiatry. DEVANTE improved with fluid resuscitation.       Interval History:  No acute events overnight. Patient did require IV Ativan. Still with elevated CIWA scores. Will revert from taper to PO Valium Q6H and increase intervals as appropriate.  Continued on ceftriaxone for UTI.    Review of Systems   Constitutional:  Positive for appetite change and chills. Negative for fatigue and fever.   HENT:  Negative for congestion and sinus pain.    Respiratory:  Negative for cough, shortness of breath and wheezing.    Cardiovascular:  Negative for chest pain, palpitations and leg swelling.   Gastrointestinal:  Positive for nausea and vomiting. Negative for abdominal distention, abdominal pain, blood in stool and diarrhea.   Genitourinary:  Negative for difficulty urinating and urgency.   Musculoskeletal:  Negative for back pain, myalgias and neck pain.   Skin:  Negative for rash and wound.   Neurological:  Positive for light-headedness. Negative for dizziness, syncope and headaches.   Hematological:  Bruises/bleeds easily.   Psychiatric/Behavioral:  Negative for agitation and behavioral problems. The patient is nervous/anxious.      Objective:     Vital Signs (Most Recent):  Temp: 98.9 °F (37.2 °C) (05/17/23 1549)  Pulse: 95 (05/17/23 1549)  Resp: 19 (05/17/23 1549)  BP: (!) 173/84 (05/17/23 1549)  SpO2: 98 % (05/17/23 1549) Vital Signs (24h Range):  Temp:  [96.9 °F (36.1 °C)-98.9 °F (37.2 °C)] 98.9 °F (37.2 °C)  Pulse:  [81-95] 95  Resp:  [18-19] 19  SpO2:  [91 %-100 %] 98 %  BP: (122-173)/(60-84) 173/84     Weight: 75.8 kg (167 lb 1.7 oz)  Body mass index is 26.97 kg/m².  No intake or output data in the 24 hours ending 05/17/23 1648      Physical Exam  Vitals and nursing note reviewed.   Constitutional:       Comments: Pt shivering and uncomfortable in bed   HENT:  "     Head: Contusion and left periorbital erythema present.      Comments: Bruising present around L eye   Eyes:      General: No scleral icterus.  Cardiovascular:      Rate and Rhythm: Regular rhythm. Tachycardia present.   Pulmonary:      Effort: Pulmonary effort is normal. No respiratory distress.      Breath sounds: No wheezing.   Abdominal:      General: Abdomen is flat. Bowel sounds are normal. There is no distension.      Tenderness: There is no abdominal tenderness.   Musculoskeletal:      Cervical back: Normal range of motion. No rigidity.      Right lower leg: No edema.      Left lower leg: No edema.   Skin:     Findings: Bruising present.   Neurological:      Mental Status: She is alert and oriented to person, place, and time.      Comments: tremors present  Psychiatric:         Attention and Perception: Attention normal.         Mood and Affect: Mood is anxious and depressed.         Speech: Speech normal.         Thought Content: Thought content normal.         Cognition and Memory: Cognition normal.         Judgment: Judgment normal.           Significant Labs: All pertinent labs within the past 24 hours have been reviewed.    Significant Imaging: I have reviewed all pertinent imaging results/findings within the past 24 hours.      Assessment/Plan:      * Alcoholic ketoacidosis  62-year-old female with significant alcohol use history and prior alcohol withdrawal seizures presenting with nausea and vomiting in the setting of heavy alcohol use and noted to have metabolic acidosis on presentation. Mild tremors noted. Given Valium 1x in the ED. Denies hematemesis or melena.    - Valium Q6H with planned taper  - CIWA protocol  - IV Ativan PRN for CIWA > 8  - F/U UDS, serum Ethanol, b-hydroxybutarate, PETH  - Thiamine, multivitamin  - IVF resuscitaion      Alcohol use disorder, severe, dependence  See "Alcoholic Ketoacidosis" Has attempted Rehab in the past. She is concerned her EtOH use is affecting her " marriage.     - Addiction Psychiatry consulted; appreciate recommendations    CLL (chronic lymphocytic leukemia)  Established with Dr. Gonzalez. Not currently on treatment. Heme/onc appointment scheduled 5/17    - Outpatient Heme-Onc follow up      Malignant neoplasm of central portion of right breast in female, estrogen receptor positive  T1b N0 stage IA breast cancer diagnosed October 2020. S/p bilateral mastectomy with no adjuvant therapy; received Letrozole February 2021-may 2021        DEVANTE (acute kidney injury)  Patient with acute kidney injury likely due to IVVD/dehydration DEVANTE is currently stable. Labs reviewed- Renal function/electrolytes with Estimated Creatinine Clearance: 73.9 mL/min (based on SCr of 0.8 mg/dL). according to latest data. Monitor urine output and serial BMP and adjust therapy as needed. Avoid nephrotoxins and renally dose meds for GFR listed above.     - Creatinine 2.2 on admit, baseline around 0.6    Plan:   Lab Results   Component Value Date    CREATININE 0.8 05/17/2023     - Check urine lytes and UPCR  - IVF  - Avoid nephrotoxic agents such as NSAIDs, gadolinium and IV radiocontrast.  - Renally dose meds to current GFR.  - Maintain MAP > 65.    IMPROVED        Essential hypertension  Home regimen: lisinopril 20mg    - HOLD in the setting of current normotension and DEVANTE    Fibromyalgia  - Continue home cymbalta given DEVANTE      Hypomagnesemia  Mg 1.3 on presentation    - Replete PRN       Hyperlipidemia  Lipid panel 4/6/2023: , HDL 44, ,     - Not currently on treatment         Hypothyroidism  TSH 0.8 12/2022    - Continue home synthroid    Type 2 diabetes mellitus with hyperglycemia  Patient's FSGs are uncontrolled due to hypoglycemia on current medication regimen.  Last A1c reviewed-   Lab Results   Component Value Date    HGBA1C 5.1 03/26/2023     Most recent fingerstick glucose reviewed-   Recent Labs   Lab 05/16/23  0713 05/16/23  0747 05/16/23  1052 05/16/23  1532    POCTGLUCOSE 67* 128* 132* 133*     Current correctional scale  Low  Maintain anti-hyperglycemic dose as follows-   Antihyperglycemics (From admission, onward)    Start     Stop Route Frequency Ordered    05/15/23 1414  insulin aspart U-100 pen 0-5 Units         -- SubQ Before meals & nightly PRN 05/15/23 1321        Hold Oral hypoglycemics while patient is in the hospital. Home regimen: metformin 1000mg BID    - Initially hypoglycemic with improvement S/P D10 administration  - POCT    COPD (chronic obstructive pulmonary disease)  Home regimen: Breo, combivent      Depression  Patient has persistent depression which is unknown and is currently controlled. Will Continue anti-depressant medications. We will not consult psychiatry at this time. Patient does not display psychosis at this time. Continue to monitor closely and adjust plan of care as needed.    - HOLD home cymbalta given DEVANTE      Obesity (BMI 30.0-34.9)  General weight loss/lifestyle modification strategies discussed (elicit support from others; identify saboteurs; non-food rewards, etc).        Sarcoidosis  Patient with documented history of sarcoidosis.  Not currently on treatment.      Alcohol withdrawal syndrome with complication  Status post IV Valium x 2 and Ativan    - currently on Valium q.6 H  - supportive care PRN      VTE Risk Mitigation (From admission, onward)         Ordered     heparin (porcine) injection 5,000 Units  Every 8 hours         05/15/23 1518     IP VTE HIGH RISK PATIENT  Once         05/15/23 1518     Place sequential compression device  Until discontinued         05/15/23 1320                Discharge Planning   BECCA: 5/18/2023     Code Status: Full Code   Is the patient medically ready for discharge?: No    Reason for patient still in hospital (select all that apply): Patient trending condition  Discharge Plan A: Home, Home with family          King Wallace MD  Internal Medicine, PGY-1  [facility]

## 2023-05-17 NOTE — ASSESSMENT & PLAN NOTE
Patient with acute kidney injury likely due to IVVD/dehydration DEVANTE is currently stable. Labs reviewed- Renal function/electrolytes with Estimated Creatinine Clearance: 73.9 mL/min (based on SCr of 0.8 mg/dL). according to latest data. Monitor urine output and serial BMP and adjust therapy as needed. Avoid nephrotoxins and renally dose meds for GFR listed above.     - Creatinine 2.2 on admit, baseline around 0.6    Plan:   Lab Results   Component Value Date    CREATININE 0.8 05/17/2023     - Check urine lytes and UPCR  - IVF  - Avoid nephrotoxic agents such as NSAIDs, gadolinium and IV radiocontrast.  - Renally dose meds to current GFR.  - Maintain MAP > 65.    IMPROVED

## 2023-05-17 NOTE — PLAN OF CARE
SW met with pt.  No needs expressed at this time.  SW will follow up with pt tomorrow to discuss SA resources and interest.      Marlena Mckinney LMSW  PRN-  [facility]  Ext. [phone]

## 2023-05-17 NOTE — PROGRESS NOTES
FOLLOW UP VISIT: ADDICTION PSYCHIATRY CONSULTATION SERVICE      ASSESSMENT AND PLAN:     DIAGNOSES & PROBLEMS:  Alcohol use disorder, severe  Tobacco use disorder  Major depressive disorder, recurrent  Unspecified anxiety disorder    In Summary:  62 y.o. female with a history of alcohol use disorder with complicated withdrawal, anxiety, and depression who presented on 5/15/2023 with chief complaint of nausea, vomiting, and generalized weakness; she was ultimately admitted for alcoholic ketoacidosis and alcohol withdrawals. Addiction psychiatry was consulted for assistance with alcohol withdrawal and alcohol use disorder management. She required Valium and Ativan in the ED for withdrawal symptoms and has been started on scheduled Valium taper by primary team. On initial psychiatric evaluation, patient appeared tremulous and in marked amount of emotional distress, appearing objectively depressed and despondent about her situation. She was recently evaluated by addiction psychiatry service during similar presentation last month, and was discharged with plan to [facility]; patient never started IOP after discharged returned to drinking a half bottle of vodka daily with last drink the morning of presentation (5/15). She endorsed significant depressive and anxiety symptoms (but denied SI/HI/AVH). She expressed intent to start IOP after discharge.     Plan:  - Continue Valium taper as ordered by primary  - Continue PRN Ativan, CIWA protocol for breakthrough alcohol withdrawal symptoms  - Continue thiamine, folate, and multivitamin supplementation  - Continue home Cymbalta 90 mg daily       - continue alcohol withdrawal protocol while patient is in house, including supportive measures,frequent monitoring of vitals and CIWA-Ar, and initiation of PRN +/- standing benzodiazepines to minimize risk of complicated withdrawal  - MAT for alcohol use disorder was discussed including acamprosate, naltrexone and  disulfiram  - patient counseled on abstinence from alcohol and substances of abuse (illicit and prescription)  - counseled on full engagement in 12 step (or equivalent) recovery program(s), including acquisition of a sponsor  - recommend patient enroll in addiction rehab program after discharge and medical stabilization  - addiction resource sheet provided to patient  - relapse prevention and motivational interviewing provided      INTERVAL HISTORY AND ROS:     Patient seen resting in bed with  at bedside. She complains of ongoing nausea and vomiting, with  endorsing several episodes of emesis since he has been here this morning. Patient reports getting minimal sleep last night, stating she is able to fall asleep but cannot stay asleep. She is being given Melatonin 6mg for insomnia without improvement and is now requesting trazodone, which she takes nightly at home. Her home dose is 300mg, however she states her primary team is going to start her at a much lower dose tonight. Patient's tremor is markedly improved since yesterday, mild tremor still noted on exam today. She reports having intermittent headaches. Denies any other withdrawal symptoms. Patient required 1 prn dose of IV Ativan 2mg at 7:46pm last night, no other prns given. Patient continues to endorse depressive symptoms and states she is irritable today because she is in the hospital. She does however state she feels she is overall in a better mood this morning because her  is here with her. She denies SI/HI/AVH. Patient and  reiterated intent to [facility] upon discharge.       CURRENT PSYCHOTROPIC REGIMEN:  Valium taper, 10mg q8hrs today  Cymbalta 90mg QD  IV Ativan 2mg PRN, CIWA protocol       PERTINENT PAST HISTORY AND CHART REVIEW:     Please see initial consult note from 5/16 for full H+P.    Per chart review, patient received PRN Ativan 2 mg IV last night at 1946, as well as PRN melatonin 6 mg last night for  "insomnia.      EXAMINATION:     /60 (BP Location: Right arm, Patient Position: Lying)   Pulse 81   Temp 97.5 °F (36.4 °C) (Oral)   Resp 18   Ht 5' 6" (1.676 m)   Wt 75.8 kg (167 lb 1.7 oz)   SpO2 (!) 93%   BMI 26.97 kg/m²     MENTAL STATUS EXAMINATION:  General Appearance & Behavior:  dressed in casual attire, in no acute distress, under good behavioral control, poorly groomed, disheveled  Involuntary Movements and Motor Activity: +mild tremor, improved from yesterday, otherwise unremarkable  Speech & Language: decreased spontaneity, soft, mumbling  Mood: "Pissy"  Affect: constricted  Thought Process & Associations:  linear and goal-directed, with no loosening of associations  Thought Content & Perceptions:  no suicidal or homicidal ideation, no evidence of psychosis  Sensorium and Cognition: drowsy, oriented to person and place, oriented partially to time  Insight & Judgment:  intact, demonstrates awareness of illness and adequate/appropriate behavior given the circumstances      RISK MANAGEMENT:     The following risk parameters were assessed during this evaluation:    I[]I Y  I[x]I N  I[]I U  I[]I A  Suicidal Ideation/Behavior:    I[]I Y  I[x]I N  I[]I U  I[]I A  Homicidal Ideation/Behavior:   I[]I Y  I[x]I N  I[]I U  I[]I A  Violence:   I[]I Y  I[x]I N  I[]I U  I[]I A  Self-Injurious Behavior:     The patient is deemed to be a reliable and factually accurate historian.    I[]I Y  I[x]I N  I[]I U  I[]I A  I[]I N/A  Minimization of Risk Parameters Suspected/Evident:   I[]I Y  I[x]I N  I[]I U  I[]I A  I[]I N/A  Exaggeration of Risk Parameters Suspected/Evident:       [] Y  [x] N  Danger to Self:   [] Y  [x] N  Danger to Others:   [] Y  [x] N  Grave Disability:     The patient does not currently meet the criteria for psychiatric admission and can be safely and effectively managed in a less restrictive level of care.    In cases of emergency, daily coverage provided by Acute/ER Psych MD, NP, " PA, or DALILA, with contact numbers located in [facility] On Call Schedule.    Tyson Acevedo MD  Department of Psychiatry  [facility]        KEY:     I[]I Y = Yes / Present / Endorses  I[]I N = No / Absent / Denies  I[]I U = Unknown / Unable to Assess / Unwilling to Participate  I[]I A = Ambiguity Exists / Accuracy Uncertain  I[]I D = Denial or Minimization is Suspected/Evident  I[]I N/A = Non-Applicable    CHART REVIEW:     Available documentation has been reviewed, and pertinent elements of the chart - including previous psychiatric evaluations - have been incorporated into this evaluation where appropriate.    ADVICE AND COUNSELING:     [x] In cases of emergencies (e.g. SI/HI resulting in danger to self or others, functioning deteriorates to the level of grave disability), call 911 or [phone], or present to the emergency department for immediate assistance.  [x] Patient should not operate a motor vehicle or heavy machinery if effects of medications or underlying symptoms/condition impair the ability to safely do so.    Alcohol, Tobacco, and Drug Counseling, as well as resources, has been provided, as warranted.     Shared medical decision making and informed consent are the hallmark and bedrock of good clinical care, and as such have been employed and obtained, respectively, to the degree possible.      Risk Mitigation Strategies, Harm Reduction Techniques, and Safety Netting are important interventions that can reduce acute and chronic risk, and as such have been employed to the degree possible.    Prescription Drug Management entails the review, recommendation, or consideration without recommendation of medications, and as such was employed during the encounter.    Additional Psychoeducation has been provided, as warranted.    Discussed, to the extent possible, diagnosis, risks and benefits of proposed treatment vs alternative treatments vs no treatment, potential side effects of these treatments and the  inherent unpredictability of treatment. The patient's ability to understand, participate and engage in a conversation surrounding this was deemed to be: adequate.     Written material has been provided to supplement, augment, and reinforce any discussions and interventions, via the AVS or other pre-printed handouts, as warranted.      DIAGNOSTIC TESTING:     The chart was reviewed for recent diagnostic procedures and investigations, and pertinent results are noted below.    Wt Readings from Last 2 Encounters:   05/15/23 75.8 kg (167 lb 1.7 oz)   04/26/23 78.3 kg (172 lb 9.9 oz)     BP Readings from Last 1 Encounters:   05/17/23 129/60     Pulse Readings from Last 1 Encounters:   05/17/23 81        Blood Counts, Electrolytes & Glucose: (i.e. WBC, ANC, Hemoglobin, Hematocrit, MCV, Platelets)  Lab Results   Component Value Date    WBC 4.79 05/17/2023    GRAN 1.0 (L) 05/17/2023    GRAN 21.3 (L) 05/17/2023    HGB 10.8 (L) 05/17/2023    HCT 35.6 (L) 05/17/2023    MCV 93 05/17/2023    PLT 70 (L) 05/17/2023     05/17/2023    K 4.7 05/17/2023    CALCIUM 8.2 (L) 05/17/2023    PHOS 1.9 (L) 05/17/2023    MG 1.3 (L) 05/17/2023    CO2 25 05/17/2023    ANIONGAP 11 05/17/2023     05/17/2023    HGBA1C 5.1 03/26/2023       Renal, Liver, Pancreas, Thyroid, Parathyroid, Prolactin, CPK, Lipids & Vitamin Levels: (i.e. Cr, BUN, Anion Gap, GFR, Urine Specific Gravity, Urine Protein, Microalburnin, AST, ALT, GGT, Alk Phos,Total Bili, Total Protein, Albumin, Ammonia, INR, Amylase, Lipase, TSH, Total T3, Total T4, Free T4 PTH, Prolactin, CPK, Cholesterol, Triglycerides, LDH, HDL, Vitamin B12, Folate, Vitamin D)  Lab Results   Component Value Date    CREATININE 0.8 05/17/2023    BUN 9 05/17/2023    EGFRNORACEVR >60.0 05/17/2023    SPECGRAV 1.015 05/15/2023    PROTEINUA Trace (A) 05/15/2023    AST 30 05/17/2023    ALT 17 05/17/2023     (H) 04/01/2011    ALKPHOS 39 (L) 05/17/2023    BILITOT 0.4 05/17/2023    LABPROT 10.2  05/15/2023    ALBUMIN 3.6 05/17/2023    AMMONIA 29 04/06/2023    INR 0.9 05/15/2023    AMYLASE 19 (L) 10/14/2014    LIPASE 16 05/15/2023    TSH 0.820 12/24/2022    FREET4 0.90 04/18/2022    PTH 45 10/15/2014    CPK 25 04/01/2023    CHOL 184 04/06/2023    TRIG 161 (H) 04/06/2023    LDLCALC 107.8 04/06/2023    HDL 44 04/06/2023    [id number] 542 04/06/2023    FOLATE 13.3 04/06/2023    THIAMINEBLOO 141 (H) 04/26/2023    [id number] 24 (L) 10/15/2014       Infection Diseases, Pregnancy Screenings & Drug Levels: (i.e. Hepatitis Panel, HIV, Syphilis, Urine & Blood Pregnancy Screens, beta hCG, Lithium, Valproic Acid, Carbamazepine, Lamotrigine, Phenytoin, Phenobarbital, Clozapine, Norclozapine, Clozapine + Norclozapine)   Lab Results   Component Value Date    HEPAIGM Negative 07/26/2017    HEPBIGM Positive (A) 07/26/2017    HEPCAB Non-reactive 03/10/2023    [id number] Non-reactive 03/10/2023    HCGQUANT <2.0 05/09/2006    LITHIUM <0.1 (L) 09/29/2016       Addiction: (i.e. Urine Toxicology, Blood Alcohol, PETH, EtG, EtS, CDT, Buprenorphine, Norbuprenorphine)  Lab Results   Component Value Date    PCDSOALCOHOL 14 (A) 05/15/2023    PCDSOBENZOD Presumptive Positive (A) 05/15/2023    BARBITURATES Negative 05/15/2023    PCDSCOMETHA Negative 05/15/2023    OPIATESCREEN Negative 05/15/2023    COCAINEMETAB Negative 05/15/2023    AMPHETAMINES Negative 05/15/2023    MARIJUANATHC Negative 05/15/2023    PCDSOPHENCYN Negative 05/15/2023    [id number] 1092 09/27/2022    ALCOHOLMEDIC 86 (H) 04/25/2023       Results for orders placed or performed during the hospital encounter of 05/15/23   EKG 12-lead    Collection Time: 05/15/23 11:15 AM    Narrative    Test Reason : R11.0,    Vent. Rate : 096 BPM     Atrial Rate : 096 BPM     P-R Int : 144 ms          QRS Dur : 094 ms      QT Int : 352 ms       P-R-T Axes : 085 081 069 degrees     QTc Int : 444 ms    Normal sinus rhythm  Normal ECG  When compared with ECG of 25-APR-2023 22:17,  No  significant change was found  Confirmed by Breanne Cary MD ([id number]) on 5/15/2023 3:21:24 PM    Referred By: [facility]   SELF           Confirmed By:Breanne Cary MD       Results for orders placed or performed during the hospital encounter of 05/15/23   CT Head Without Contrast    Narrative    EXAMINATION:  CT HEAD WITHOUT CONTRAST    CLINICAL HISTORY:  Head trauma, moderate-severe;    TECHNIQUE:  Low dose axial images were obtained through the head.  Coronal and sagittal reformations were also performed. Contrast was not administered.    COMPARISON:  04/01/2023    FINDINGS:  No evidence of hydrocephalus, mass effect, intracranial hemorrhage or acute territorial infarct.  Mild stable volume loss.  The brain parenchyma maintains normal attenuation.    The calvarium is intact. The visualized sinuses and mastoid air cells are clear.      Impression    No acute intracranial hemorrhage/injury.      Electronically signed by: Tyson Bob  Date:    05/15/2023  Time:    14:43   Results for orders placed or performed during the hospital encounter of 06/10/22   MRI Brain Without Contrast    Narrative    EXAMINATION:  MRI BRAIN WITHOUT CONTRAST    CLINICAL HISTORY:  hx of PRES;    TECHNIQUE:  Multiplanar multisequence MR imaging of the brain was performed without intravenous contrast.    COMPARISON:  Head CT 06/10/2022, brain MRI 10/04/2017, 07/21/2017    FINDINGS:  Intracranial Compartment:    Ventricles are normal in size for age without evidence of hydrocephalus.    Ill-defined focus T2-FLAIR signal hyperintensity in the right periatrial white matter.  Few additional scattered punctate foci elsewhere within the supratentorial white matter.  These appear similar to the prior study of 10/04/2017.  No definite new focal lesions.  No diffusion restriction to indicate an acute infarction.  No remote major vascular distribution infarct.  No recent or remote hemorrhage.  No mass effect or midline shift.    No extra-axial  blood or fluid collections.    Normal vascular flow voids are preserved.    Skull/Extracranial Contents (limited evaluation):    Bone marrow signal intensity is normal.      Impression    No evidence of acute intracranial pathology.      Electronically signed by: Miguel Malagon MD  Date:    06/10/2022  Time:    09:45

## 2023-05-17 NOTE — SUBJECTIVE & OBJECTIVE
Interval History:  No acute events overnight. Patient did require IV Ativan. Still with elevated CIWA scores. Will revert from taper to PO Valium Q6H and increase intervals as appropriate.  Continued on ceftriaxone for UTI.    Review of Systems   Constitutional:  Positive for appetite change and chills. Negative for fatigue and fever.   HENT:  Negative for congestion and sinus pain.    Respiratory:  Negative for cough, shortness of breath and wheezing.    Cardiovascular:  Negative for chest pain, palpitations and leg swelling.   Gastrointestinal:  Positive for nausea and vomiting. Negative for abdominal distention, abdominal pain, blood in stool and diarrhea.   Genitourinary:  Negative for difficulty urinating and urgency.   Musculoskeletal:  Negative for back pain, myalgias and neck pain.   Skin:  Negative for rash and wound.   Neurological:  Positive for light-headedness. Negative for dizziness, syncope and headaches.   Hematological:  Bruises/bleeds easily.   Psychiatric/Behavioral:  Negative for agitation and behavioral problems. The patient is nervous/anxious.      Objective:     Vital Signs (Most Recent):  Temp: 98.9 °F (37.2 °C) (05/17/23 1549)  Pulse: 95 (05/17/23 1549)  Resp: 19 (05/17/23 1549)  BP: (!) 173/84 (05/17/23 1549)  SpO2: 98 % (05/17/23 1549) Vital Signs (24h Range):  Temp:  [96.9 °F (36.1 °C)-98.9 °F (37.2 °C)] 98.9 °F (37.2 °C)  Pulse:  [81-95] 95  Resp:  [18-19] 19  SpO2:  [91 %-100 %] 98 %  BP: (122-173)/(60-84) 173/84     Weight: 75.8 kg (167 lb 1.7 oz)  Body mass index is 26.97 kg/m².  No intake or output data in the 24 hours ending 05/17/23 1648      Physical Exam  Vitals and nursing note reviewed.   Constitutional:       Comments: Pt shivering and uncomfortable in bed   HENT:      Head: Contusion and left periorbital erythema present.      Comments: Bruising present around L eye   Eyes:      General: No scleral icterus.  Cardiovascular:      Rate and Rhythm: Regular rhythm. Tachycardia  present.   Pulmonary:      Effort: Pulmonary effort is normal. No respiratory distress.      Breath sounds: No wheezing.   Abdominal:      General: Abdomen is flat. Bowel sounds are normal. There is no distension.      Tenderness: There is no abdominal tenderness.   Musculoskeletal:      Cervical back: Normal range of motion. No rigidity.      Right lower leg: No edema.      Left lower leg: No edema.   Skin:     Findings: Bruising present.   Neurological:      Mental Status: She is alert and oriented to person, place, and time.      Comments: tremors present  Psychiatric:         Attention and Perception: Attention normal.         Mood and Affect: Mood is anxious and depressed.         Speech: Speech normal.         Thought Content: Thought content normal.         Cognition and Memory: Cognition normal.         Judgment: Judgment normal.           Significant Labs: All pertinent labs within the past 24 hours have been reviewed.    Significant Imaging: I have reviewed all pertinent imaging results/findings within the past 24 hours.

## 2023-05-17 NOTE — ASSESSMENT & PLAN NOTE
62-year-old female with significant alcohol use history and prior alcohol withdrawal seizures presenting with nausea and vomiting in the setting of heavy alcohol use and noted to have metabolic acidosis on presentation. Mild tremors noted. Given Valium 1x in the ED. Denies hematemesis or melena.    - Valium Q6H with planned taper  - CIWA protocol  - IV Ativan PRN for CIWA > 8  - F/U UDS, serum Ethanol, b-hydroxybutarate, PETH  - Thiamine, multivitamin  - IVF resuscitaion

## 2023-05-18 LAB
ALBUMIN SERPL BCP-MCNC: 3.2 G/DL (ref 3.5–5.2)
ALP SERPL-CCNC: 37 U/L (ref 55–135)
ALT SERPL W/O P-5'-P-CCNC: 21 U/L (ref 10–44)
ANION GAP SERPL CALC-SCNC: 9 MMOL/L (ref 8–16)
ANISOCYTOSIS BLD QL SMEAR: SLIGHT
AST SERPL-CCNC: 37 U/L (ref 10–40)
BASOPHILS # BLD AUTO: 0.02 K/UL (ref 0–0.2)
BASOPHILS NFR BLD: 0.6 % (ref 0–1.9)
BILIRUB SERPL-MCNC: 0.3 MG/DL (ref 0.1–1)
BUN SERPL-MCNC: 4 MG/DL (ref 8–23)
CALCIUM SERPL-MCNC: 8.3 MG/DL (ref 8.7–10.5)
CHLORIDE SERPL-SCNC: 103 MMOL/L (ref 95–110)
CO2 SERPL-SCNC: 28 MMOL/L (ref 23–29)
CREAT SERPL-MCNC: 0.7 MG/DL (ref 0.5–1.4)
DIFFERENTIAL METHOD: ABNORMAL
EOSINOPHIL # BLD AUTO: 0.1 K/UL (ref 0–0.5)
EOSINOPHIL NFR BLD: 1.5 % (ref 0–8)
ERYTHROCYTE [DISTWIDTH] IN BLOOD BY AUTOMATED COUNT: 17.5 % (ref 11.5–14.5)
EST. GFR  (NO RACE VARIABLE): >60 ML/MIN/1.73 M^2
GLUCOSE SERPL-MCNC: 108 MG/DL (ref 70–110)
HCT VFR BLD AUTO: 30.6 % (ref 37–48.5)
HGB BLD-MCNC: 9.6 G/DL (ref 12–16)
HYPOCHROMIA BLD QL SMEAR: ABNORMAL
IMM GRANULOCYTES # BLD AUTO: 0.01 K/UL (ref 0–0.04)
IMM GRANULOCYTES NFR BLD AUTO: 0.3 % (ref 0–0.5)
LYMPHOCYTES # BLD AUTO: 2.2 K/UL (ref 1–4.8)
LYMPHOCYTES NFR BLD: 67.3 % (ref 18–48)
MAGNESIUM SERPL-MCNC: 1.6 MG/DL (ref 1.6–2.6)
MCH RBC QN AUTO: 28 PG (ref 27–31)
MCHC RBC AUTO-ENTMCNC: 31.4 G/DL (ref 32–36)
MCV RBC AUTO: 89 FL (ref 82–98)
MONOCYTES # BLD AUTO: 0.3 K/UL (ref 0.3–1)
MONOCYTES NFR BLD: 8.1 % (ref 4–15)
NEUTROPHILS # BLD AUTO: 0.7 K/UL (ref 1.8–7.7)
NEUTROPHILS NFR BLD: 22.2 % (ref 38–73)
NRBC BLD-RTO: 0 /100 WBC
OVALOCYTES BLD QL SMEAR: ABNORMAL
PHOSPHATE SERPL-MCNC: 2.9 MG/DL (ref 2.7–4.5)
PLATELET # BLD AUTO: 62 K/UL (ref 150–450)
PMV BLD AUTO: 11 FL (ref 9.2–12.9)
POCT GLUCOSE: 109 MG/DL (ref 70–110)
POCT GLUCOSE: 119 MG/DL (ref 70–110)
POCT GLUCOSE: 189 MG/DL (ref 70–110)
POCT GLUCOSE: 86 MG/DL (ref 70–110)
POCT GLUCOSE: 99 MG/DL (ref 70–110)
POIKILOCYTOSIS BLD QL SMEAR: SLIGHT
POLYCHROMASIA BLD QL SMEAR: ABNORMAL
POTASSIUM SERPL-SCNC: 4.2 MMOL/L (ref 3.5–5.1)
PROT SERPL-MCNC: 5.2 G/DL (ref 6–8.4)
RBC # BLD AUTO: 3.43 M/UL (ref 4–5.4)
SODIUM SERPL-SCNC: 140 MMOL/L (ref 136–145)
SPHEROCYTES BLD QL SMEAR: ABNORMAL
WBC # BLD AUTO: 3.33 K/UL (ref 3.9–12.7)

## 2023-05-18 PROCEDURE — 11000001 HC ACUTE MED/SURG PRIVATE ROOM

## 2023-05-18 PROCEDURE — 99233 PR SUBSEQUENT HOSPITAL CARE,LEVL III: ICD-10-PCS | Mod: ,,, | Performed by: STUDENT IN AN ORGANIZED HEALTH CARE EDUCATION/TRAINING PROGRAM

## 2023-05-18 PROCEDURE — 25000003 PHARM REV CODE 250

## 2023-05-18 PROCEDURE — 83735 ASSAY OF MAGNESIUM: CPT

## 2023-05-18 PROCEDURE — 63600175 PHARM REV CODE 636 W HCPCS

## 2023-05-18 PROCEDURE — 99233 SBSQ HOSP IP/OBS HIGH 50: CPT | Mod: ,,, | Performed by: STUDENT IN AN ORGANIZED HEALTH CARE EDUCATION/TRAINING PROGRAM

## 2023-05-18 PROCEDURE — 36415 COLL VENOUS BLD VENIPUNCTURE: CPT

## 2023-05-18 PROCEDURE — 84100 ASSAY OF PHOSPHORUS: CPT

## 2023-05-18 PROCEDURE — 85025 COMPLETE CBC W/AUTO DIFF WBC: CPT

## 2023-05-18 PROCEDURE — 94761 N-INVAS EAR/PLS OXIMETRY MLT: CPT

## 2023-05-18 PROCEDURE — 80053 COMPREHEN METABOLIC PANEL: CPT

## 2023-05-18 RX ORDER — DIAZEPAM 5 MG/1
10 TABLET ORAL EVERY 8 HOURS
Status: DISCONTINUED | OUTPATIENT
Start: 2023-05-18 | End: 2023-05-20

## 2023-05-18 RX ORDER — LISINOPRIL 20 MG/1
20 TABLET ORAL DAILY
Status: DISCONTINUED | OUTPATIENT
Start: 2023-05-18 | End: 2023-05-21 | Stop reason: HOSPADM

## 2023-05-18 RX ADMIN — LISINOPRIL 20 MG: 20 TABLET ORAL at 03:05

## 2023-05-18 RX ADMIN — HEPARIN SODIUM 5000 UNITS: 5000 INJECTION INTRAVENOUS; SUBCUTANEOUS at 10:05

## 2023-05-18 RX ADMIN — DIAZEPAM 10 MG: 5 TABLET ORAL at 10:05

## 2023-05-18 RX ADMIN — Medication 6 MG: at 10:05

## 2023-05-18 RX ADMIN — DULOXETINE 90 MG: 30 CAPSULE, DELAYED RELEASE ORAL at 09:05

## 2023-05-18 RX ADMIN — ACETAMINOPHEN 650 MG: 325 TABLET ORAL at 09:05

## 2023-05-18 RX ADMIN — ONDANSETRON 4 MG: 2 INJECTION INTRAMUSCULAR; INTRAVENOUS at 01:05

## 2023-05-18 RX ADMIN — DIAZEPAM 10 MG: 5 TABLET ORAL at 05:05

## 2023-05-18 RX ADMIN — LEVOTHYROXINE SODIUM 50 MCG: 50 TABLET ORAL at 05:05

## 2023-05-18 RX ADMIN — CEFTRIAXONE 1 G: 1 INJECTION, POWDER, FOR SOLUTION INTRAMUSCULAR; INTRAVENOUS at 12:05

## 2023-05-18 RX ADMIN — ACETAMINOPHEN 650 MG: 325 TABLET ORAL at 02:05

## 2023-05-18 RX ADMIN — SODIUM CHLORIDE, POTASSIUM CHLORIDE, SODIUM LACTATE AND CALCIUM CHLORIDE: 600; 310; 30; 20 INJECTION, SOLUTION INTRAVENOUS at 02:05

## 2023-05-18 RX ADMIN — HEPARIN SODIUM 5000 UNITS: 5000 INJECTION INTRAVENOUS; SUBCUTANEOUS at 03:05

## 2023-05-18 RX ADMIN — TRAZODONE HYDROCHLORIDE 50 MG: 50 TABLET ORAL at 10:05

## 2023-05-18 RX ADMIN — FOLIC ACID 1 MG: 1 TABLET ORAL at 09:05

## 2023-05-18 RX ADMIN — THIAMINE HCL TAB 100 MG 100 MG: 100 TAB at 09:05

## 2023-05-18 RX ADMIN — LORAZEPAM 2 MG: 2 INJECTION INTRAMUSCULAR; INTRAVENOUS at 11:05

## 2023-05-18 RX ADMIN — DIAZEPAM 10 MG: 5 TABLET ORAL at 03:05

## 2023-05-18 RX ADMIN — HEPARIN SODIUM 5000 UNITS: 5000 INJECTION INTRAVENOUS; SUBCUTANEOUS at 05:05

## 2023-05-18 RX ADMIN — THERA TABS 1 TABLET: TAB at 09:05

## 2023-05-18 RX ADMIN — LORAZEPAM 2 MG: 2 INJECTION INTRAMUSCULAR; INTRAVENOUS at 05:05

## 2023-05-18 NOTE — SUBJECTIVE & OBJECTIVE
Interval History: Received one dose of breakthrough IV ativan overnight. Reports emesis better controlled this AM; however still having a headache. Kidney function has improved so will resume home Cymbalta.     Review of Systems   Constitutional:  Positive for appetite change. Negative for chills, fatigue and fever.   HENT:  Negative for congestion and sinus pain.    Respiratory:  Negative for cough, shortness of breath and wheezing.    Cardiovascular:  Negative for chest pain, palpitations and leg swelling.   Gastrointestinal:  Positive for nausea and vomiting. Negative for abdominal distention, abdominal pain, blood in stool and diarrhea.   Genitourinary:  Negative for difficulty urinating and urgency.   Musculoskeletal:  Negative for back pain, myalgias and neck pain.   Skin:  Negative for rash and wound.   Neurological:  Positive for headaches. Negative for dizziness and syncope.   Hematological:  Bruises/bleeds easily.   Psychiatric/Behavioral:  Negative for agitation and behavioral problems. The patient is not nervous/anxious.    Objective:     Vital Signs (Most Recent):  Temp: 97.2 °F (36.2 °C) (05/18/23 1038)  Pulse: 91 (05/18/23 1038)  Resp: 18 (05/18/23 1038)  BP: (!) 151/76 (05/18/23 1038)  SpO2: 98 % (05/18/23 1038) Vital Signs (24h Range):  Temp:  [97.1 °F (36.2 °C)-98.9 °F (37.2 °C)] 97.2 °F (36.2 °C)  Pulse:  [80-96] 91  Resp:  [18-20] 18  SpO2:  [90 %-100 %] 98 %  BP: (145-174)/(67-84) 151/76     Weight: 75.8 kg (167 lb 1.7 oz)  Body mass index is 26.97 kg/m².    Intake/Output Summary (Last 24 hours) at 5/18/2023 1349  Last data filed at 5/17/2023 2309  Gross per 24 hour   Intake 420 ml   Output --   Net 420 ml         Physical Exam  Vitals and nursing note reviewed.   HENT:      Head: No contusion or left periorbital erythema.   Eyes:      General: No scleral icterus.  Cardiovascular:      Rate and Rhythm: Regular rhythm. Tachycardia present.   Pulmonary:      Effort: Pulmonary effort is normal. No  respiratory distress.      Breath sounds: No wheezing.   Abdominal:      General: Abdomen is flat. Bowel sounds are normal. There is no distension.      Tenderness: There is no abdominal tenderness.   Musculoskeletal:      Cervical back: Normal range of motion. No rigidity.      Right lower leg: No edema.      Left lower leg: No edema.   Skin:     Findings: No bruising.   Neurological:      Mental Status: She is alert and oriented to person, place, and time.      Comments: No asterixis present   Psychiatric:         Attention and Perception: Attention normal.         Mood and Affect: Mood is anxious and depressed.         Speech: Speech normal.         Behavior: Behavior normal.         Thought Content: Thought content normal.         Cognition and Memory: Cognition normal.         Judgment: Judgment normal.           Significant Labs: All pertinent labs within the past 24 hours have been reviewed.    Significant Imaging: I have reviewed all pertinent imaging results/findings within the past 24 hours.

## 2023-05-18 NOTE — PROGRESS NOTES
[facility]  Progress Note    Patient Name: Earl Abdul  MRN: [id number]  Patient Class: IP- Inpatient   Admission Date: 5/15/2023  Length of Stay: 3 days  Attending Physician: Luis Smith MD  Primary Care Provider: Andrew Rodriguez MD        Subjective:     Principal Problem:Alcoholic ketoacidosis        HPI:  62 year old female with medical history significant for  EtOH use disorder with history of prior seizures, depression with suicide attempt in 2017, breast cancer, HTN, HLD, fibromyalgia, sarcoidosis, hypothyroidism, T2DM, CLL (not on treatment) and COPD presenting with complaints of nausea and vomiting x 2 days associated with generalized weakness. She reports the last time she had a meal was 1 week ago but she has been drinking EtOH very heavily. She reports drinking 0.5 bottles of vodka daily and her last drink was this morning. Per EMS, her BG was reportedly 55 prior to arrival. She states that she has had diarrhea as well. She has had alcohol withdrawals including seizures before. She was recently admitted 4/25-4/20 for the same presentation of nausea and vomiting in setting of 2 weeks of heavy alcohol use. She was initaited on CIWA and treated with valium taper, no seizures during that admission. 3 weeks prior to that she was admitted for acute on chronic respiratory failure due to COPD with non-adherance to home inhalers. She also had a mechanical fall 2 days ago in which she hurt her eye lid. Denies fevers, chest pain, hematemesis or bleeding per rectum.     On arrival to [facility], she is AF, , /96, on 4L NC. WBC 20.11 (baseline tends to fluctuate between leukopenia and leukocytosis). Hgb 11.1, Plt 149, both improved from baseline. Na 131, K 5.1, Cl 90, HCO3 15, BUN 34, Cr 2.2 (bl 0.7). Glucose 46 on arrival, which improved to 234 with administration of D10. Lactate 2.5. CXR without acute cardiopulmonary process.         Overview/Hospital Course:  Patient  admitted with Alcoholic ketoacidosis and concerns for Alcohol withdrawal. The patient was started on a Valium taper with IV ativan PRN in place for CIWA >8. She continued to be nauseous, have multiple episodes of emesis, and complain of a headache for the first two days of admission, thus required extra PRN IV ativan doses. Urine Cx grew Klebsiella and pt was given 3 days of IV Ceftriaxone. Pt continuing to improve on diazepam taper.       Interval History: Received one dose of breakthrough IV ativan overnight. Reports emesis better controlled this AM; however still having a headache. Kidney function has improved so will resume home Cymbalta.     Review of Systems   Constitutional:  Positive for appetite change. Negative for chills, fatigue and fever.   HENT:  Negative for congestion and sinus pain.    Respiratory:  Negative for cough, shortness of breath and wheezing.    Cardiovascular:  Negative for chest pain, palpitations and leg swelling.   Gastrointestinal:  Positive for nausea and vomiting. Negative for abdominal distention, abdominal pain, blood in stool and diarrhea.   Genitourinary:  Negative for difficulty urinating and urgency.   Musculoskeletal:  Negative for back pain, myalgias and neck pain.   Skin:  Negative for rash and wound.   Neurological:  Positive for headaches. Negative for dizziness and syncope.   Hematological:  Bruises/bleeds easily.   Psychiatric/Behavioral:  Negative for agitation and behavioral problems. The patient is not nervous/anxious.    Objective:     Vital Signs (Most Recent):  Temp: 97.2 °F (36.2 °C) (05/18/23 1038)  Pulse: 91 (05/18/23 1038)  Resp: 18 (05/18/23 1038)  BP: (!) 151/76 (05/18/23 1038)  SpO2: 98 % (05/18/23 1038) Vital Signs (24h Range):  Temp:  [97.1 °F (36.2 °C)-98.9 °F (37.2 °C)] 97.2 °F (36.2 °C)  Pulse:  [80-96] 91  Resp:  [18-20] 18  SpO2:  [90 %-100 %] 98 %  BP: (145-174)/(67-84) 151/76     Weight: 75.8 kg (167 lb 1.7 oz)  Body mass index is 26.97  kg/m².    Intake/Output Summary (Last 24 hours) at 5/18/2023 1349  Last data filed at 5/17/2023 2309  Gross per 24 hour   Intake 420 ml   Output --   Net 420 ml         Physical Exam  Vitals and nursing note reviewed.   HENT:      Head: No contusion or left periorbital erythema.   Eyes:      General: No scleral icterus.  Cardiovascular:      Rate and Rhythm: Regular rhythm. Tachycardia present.   Pulmonary:      Effort: Pulmonary effort is normal. No respiratory distress.      Breath sounds: No wheezing.   Abdominal:      General: Abdomen is flat. Bowel sounds are normal. There is no distension.      Tenderness: There is no abdominal tenderness.   Musculoskeletal:      Cervical back: Normal range of motion. No rigidity.      Right lower leg: No edema.      Left lower leg: No edema.   Skin:     Findings: No bruising.   Neurological:      Mental Status: She is alert and oriented to person, place, and time.      Comments: No asterixis present   Psychiatric:         Attention and Perception: Attention normal.         Mood and Affect: Mood is anxious and depressed.         Speech: Speech normal.         Behavior: Behavior normal.         Thought Content: Thought content normal.         Cognition and Memory: Cognition normal.         Judgment: Judgment normal.           Significant Labs: All pertinent labs within the past 24 hours have been reviewed.    Significant Imaging: I have reviewed all pertinent imaging results/findings within the past 24 hours.      Assessment/Plan:      * Alcoholic ketoacidosis  62-year-old female with significant alcohol use history and prior alcohol withdrawal seizures presenting with nausea and vomiting in the setting of heavy alcohol use and noted to have metabolic acidosis on presentation. Mild tremors noted. Given Valium 1x in the ED. Denies hematemesis or melena.    - Valium Q6H with planned taper  - CIWA protocol  - IV Ativan PRN for CIWA > 8  - F/U UDS, serum Ethanol, b-hydroxybutarate,  PETH  - Thiamine, multivitamin  - IVF resuscitaion      Depression  Patient has persistent depression which is unknown and is currently controlled. Will Continue anti-depressant medications. We will not consult psychiatry at this time. Patient does not display psychosis at this time. Continue to monitor closely and adjust plan of care as needed.    - HOLD home cymbalta given DEVANTE      CLL (chronic lymphocytic leukemia)  Established with Dr. Gonzalez. Not currently on treatment. Heme/onc appointment scheduled 5/17    - Outpatient Heme-Onc follow up      Obesity (BMI 30.0-34.9)  General weight loss/lifestyle modification strategies discussed (elicit support from others; identify saboteurs; non-food rewards, etc).        Type 2 diabetes mellitus with hyperglycemia  Patient's FSGs are uncontrolled due to hypoglycemia on current medication regimen.  Last A1c reviewed-   Lab Results   Component Value Date    HGBA1C 5.1 03/26/2023     Most recent fingerstick glucose reviewed-   Recent Labs   Lab 05/17/23  2034 05/18/23  0730 05/18/23  0733 05/18/23  1118   POCTGLUCOSE 118* 189* 99 119*     Current correctional scale  Low  Maintain anti-hyperglycemic dose as follows-   Antihyperglycemics (From admission, onward)    Start     Stop Route Frequency Ordered    05/15/23 1414  insulin aspart U-100 pen 0-5 Units         -- SubQ Before meals & nightly PRN 05/15/23 1321        Hold Oral hypoglycemics while patient is in the hospital. Home regimen: metformin 1000mg BID    - Initially hypoglycemic with improvement S/P D10 administration  - POCT    Hypothyroidism  TSH 0.8 12/2022    - Continue home synthroid    COPD (chronic obstructive pulmonary disease)  Home regimen: Breo, combivent      Malignant neoplasm of central portion of right breast in female, estrogen receptor positive  T1b N0 stage IA breast cancer diagnosed October 2020. S/p bilateral mastectomy with no adjuvant therapy; received Letrozole February 2021-may  "2021        Hyperlipidemia  Lipid panel 4/6/2023: , HDL 44, ,     - Not currently on treatment         Hypomagnesemia  Mg 1.3 on presentation    - Replete PRN       DEVANTE (acute kidney injury)  Patient with acute kidney injury likely due to IVVD/dehydration DEVANTE is currently stable. Labs reviewed- Renal function/electrolytes with Estimated Creatinine Clearance: 84.5 mL/min (based on SCr of 0.7 mg/dL). according to latest data. Monitor urine output and serial BMP and adjust therapy as needed. Avoid nephrotoxins and renally dose meds for GFR listed above.     - Creatinine 2.2 on admit, baseline around 0.6    Plan:   Lab Results   Component Value Date    CREATININE 0.7 05/18/2023     - Check urine lytes and UPCR  - IVF  - Avoid nephrotoxic agents such as NSAIDs, gadolinium and IV radiocontrast.  - Renally dose meds to current GFR.  - Maintain MAP > 65.    RESOLVED AFTER ADMINISTRATION OF IVFs        Sarcoidosis  Patient with documented history of sarcoidosis.  Not currently on treatment.      Fibromyalgia  - Will restart home Cymbalta as DEVANTE has resolved      Essential hypertension  Home regimen: lisinopril 20mg    - HOLD in the setting of current normotension and DEVANTE    Alcohol withdrawal syndrome with complication  Status post IV Valium x 2 and Ativan    - Continue Diazepam Q8H  - Will taper accordingly  - Give additional IV Ativan 2 mg PRN if CIWA >8    Alcohol use disorder, severe, dependence  See "Alcoholic Ketoacidosis" Has attempted Rehab in the past. She is concerned her EtOH use is affecting her marriage.     - Addiction Psychiatry consulted; appreciate recommendations      VTE Risk Mitigation (From admission, onward)         Ordered     heparin (porcine) injection 5,000 Units  Every 8 hours         05/15/23 1518     IP VTE HIGH RISK PATIENT  Once         05/15/23 1518     Place sequential compression device  Until discontinued         05/15/23 1320                Discharge Planning   BECCA: " 5/18/2023     Code Status: Full Code   Is the patient medically ready for discharge?: No    Reason for patient still in hospital (select all that apply): Patient trending condition  Discharge Plan A: Home, Home with family                  Rico Shabazz MD  Department of Hospital Medicine   [facility]

## 2023-05-18 NOTE — ASSESSMENT & PLAN NOTE
Status post IV Valium x 2 and Ativan    - Continue Diazepam Q8H  - Will taper accordingly  - Give additional IV Ativan 2 mg PRN if CIWA >8

## 2023-05-18 NOTE — PLAN OF CARE
Problem: Violence Risk or Actual  Goal: Anger and Impulse Control  Outcome: Ongoing, Progressing     Problem: Adult Inpatient Plan of Care  Goal: Plan of Care Review  Outcome: Ongoing, Progressing  Goal: Patient-Specific Goal (Individualized)  Outcome: Ongoing, Progressing  Goal: Absence of Hospital-Acquired Illness or Injury  Outcome: Ongoing, Progressing  Goal: Optimal Comfort and Wellbeing  Outcome: Ongoing, Progressing  Goal: Readiness for Transition of Care  Outcome: Ongoing, Progressing     Problem: Diabetes Comorbidity  Goal: Blood Glucose Level Within Targeted Range  Outcome: Ongoing, Progressing     Problem: Fluid and Electrolyte Imbalance (Acute Kidney Injury/Impairment)  Goal: Fluid and Electrolyte Balance  Outcome: Ongoing, Progressing     Problem: Oral Intake Inadequate (Acute Kidney Injury/Impairment)  Goal: Optimal Nutrition Intake  Outcome: Ongoing, Progressing     Problem: Renal Function Impairment (Acute Kidney Injury/Impairment)  Goal: Effective Renal Function  Outcome: Ongoing, Progressing     Problem: Fall Injury Risk  Goal: Absence of Fall and Fall-Related Injury  Outcome: Ongoing, Progressing       Patient rested comfortably with no significant changes during the shift, remains free from falls and injuries. Side rails up x2, bed low and locked, call light and personal belongings within reach. VS remain stable. Questions and concerns addressed and answered. Medication compliance. Pt continues to make progress toward meeting his healthcare goals. Will continue to monitor.

## 2023-05-18 NOTE — ASSESSMENT & PLAN NOTE
Patient with acute kidney injury likely due to IVVD/dehydration DEVANTE is currently stable. Labs reviewed- Renal function/electrolytes with Estimated Creatinine Clearance: 84.5 mL/min (based on SCr of 0.7 mg/dL). according to latest data. Monitor urine output and serial BMP and adjust therapy as needed. Avoid nephrotoxins and renally dose meds for GFR listed above.     - Creatinine 2.2 on admit, baseline around 0.6    Plan:   Lab Results   Component Value Date    CREATININE 0.7 05/18/2023     - Check urine lytes and UPCR  - IVF  - Avoid nephrotoxic agents such as NSAIDs, gadolinium and IV radiocontrast.  - Renally dose meds to current GFR.  - Maintain MAP > 65.    RESOLVED AFTER ADMINISTRATION OF IVFs

## 2023-05-18 NOTE — PLAN OF CARE
Problem: Violence Risk or Actual  Goal: Anger and Impulse Control  Outcome: Ongoing, Progressing     Problem: Adult Inpatient Plan of Care  Goal: Plan of Care Review  Outcome: Ongoing, Progressing  Goal: Patient-Specific Goal (Individualized)  Outcome: Ongoing, Progressing  Goal: Absence of Hospital-Acquired Illness or Injury  Outcome: Ongoing, Progressing  Goal: Optimal Comfort and Wellbeing  Outcome: Ongoing, Progressing  Goal: Readiness for Transition of Care  Outcome: Ongoing, Progressing     Problem: Diabetes Comorbidity  Goal: Blood Glucose Level Within Targeted Range  Outcome: Ongoing, Progressing     Problem: Fluid and Electrolyte Imbalance (Acute Kidney Injury/Impairment)  Goal: Fluid and Electrolyte Balance  Outcome: Ongoing, Progressing     Problem: Oral Intake Inadequate (Acute Kidney Injury/Impairment)  Goal: Optimal Nutrition Intake  Outcome: Ongoing, Progressing     Problem: Renal Function Impairment (Acute Kidney Injury/Impairment)  Goal: Effective Renal Function  Outcome: Ongoing, Progressing     Problem: Fall Injury Risk  Goal: Absence of Fall and Fall-Related Injury  Outcome: Ongoing, Progressing

## 2023-05-18 NOTE — ASSESSMENT & PLAN NOTE
Patient's FSGs are uncontrolled due to hypoglycemia on current medication regimen.  Last A1c reviewed-   Lab Results   Component Value Date    HGBA1C 5.1 03/26/2023     Most recent fingerstick glucose reviewed-   Recent Labs   Lab 05/17/23 2034 05/18/23  0730 05/18/23  0733 05/18/23  1118   POCTGLUCOSE 118* 189* 99 119*     Current correctional scale  Low  Maintain anti-hyperglycemic dose as follows-   Antihyperglycemics (From admission, onward)    Start     Stop Route Frequency Ordered    05/15/23 1414  insulin aspart U-100 pen 0-5 Units         -- SubQ Before meals & nightly PRN 05/15/23 1321        Hold Oral hypoglycemics while patient is in the hospital. Home regimen: metformin 1000mg BID    - Initially hypoglycemic with improvement S/P D10 administration  - POCT

## 2023-05-19 LAB
ALBUMIN SERPL BCP-MCNC: 3.6 G/DL (ref 3.5–5.2)
ALP SERPL-CCNC: 47 U/L (ref 55–135)
ALT SERPL W/O P-5'-P-CCNC: 32 U/L (ref 10–44)
ANION GAP SERPL CALC-SCNC: 11 MMOL/L (ref 8–16)
ANISOCYTOSIS BLD QL SMEAR: SLIGHT
AST SERPL-CCNC: 49 U/L (ref 10–40)
BASOPHILS # BLD AUTO: 0.02 K/UL (ref 0–0.2)
BASOPHILS NFR BLD: 0.5 % (ref 0–1.9)
BILIRUB SERPL-MCNC: 0.3 MG/DL (ref 0.1–1)
BUN SERPL-MCNC: <3 MG/DL (ref 8–23)
CALCIUM SERPL-MCNC: 8.8 MG/DL (ref 8.7–10.5)
CHLORIDE SERPL-SCNC: 101 MMOL/L (ref 95–110)
CO2 SERPL-SCNC: 23 MMOL/L (ref 23–29)
CREAT SERPL-MCNC: 0.7 MG/DL (ref 0.5–1.4)
DIFFERENTIAL METHOD: ABNORMAL
EOSINOPHIL # BLD AUTO: 0.1 K/UL (ref 0–0.5)
EOSINOPHIL NFR BLD: 1.6 % (ref 0–8)
ERYTHROCYTE [DISTWIDTH] IN BLOOD BY AUTOMATED COUNT: 17.9 % (ref 11.5–14.5)
EST. GFR  (NO RACE VARIABLE): >60 ML/MIN/1.73 M^2
GLUCOSE SERPL-MCNC: 95 MG/DL (ref 70–110)
HCT VFR BLD AUTO: 35.8 % (ref 37–48.5)
HGB BLD-MCNC: 10.7 G/DL (ref 12–16)
HYPOCHROMIA BLD QL SMEAR: ABNORMAL
IMM GRANULOCYTES # BLD AUTO: 0.01 K/UL (ref 0–0.04)
IMM GRANULOCYTES NFR BLD AUTO: 0.3 % (ref 0–0.5)
LYMPHOCYTES # BLD AUTO: 2.5 K/UL (ref 1–4.8)
LYMPHOCYTES NFR BLD: 64.5 % (ref 18–48)
MCH RBC QN AUTO: 27.9 PG (ref 27–31)
MCHC RBC AUTO-ENTMCNC: 29.9 G/DL (ref 32–36)
MCV RBC AUTO: 94 FL (ref 82–98)
MONOCYTES # BLD AUTO: 0.3 K/UL (ref 0.3–1)
MONOCYTES NFR BLD: 8.4 % (ref 4–15)
NEUTROPHILS # BLD AUTO: 0.9 K/UL (ref 1.8–7.7)
NEUTROPHILS NFR BLD: 24.7 % (ref 38–73)
NRBC BLD-RTO: 0 /100 WBC
PLATELET # BLD AUTO: 59 K/UL (ref 150–450)
PLATELET BLD QL SMEAR: ABNORMAL
PMV BLD AUTO: 11 FL (ref 9.2–12.9)
POCT GLUCOSE: 100 MG/DL (ref 70–110)
POCT GLUCOSE: 138 MG/DL (ref 70–110)
POCT GLUCOSE: 87 MG/DL (ref 70–110)
POCT GLUCOSE: 96 MG/DL (ref 70–110)
POTASSIUM SERPL-SCNC: 5.1 MMOL/L (ref 3.5–5.1)
PROT SERPL-MCNC: 6 G/DL (ref 6–8.4)
RBC # BLD AUTO: 3.83 M/UL (ref 4–5.4)
SODIUM SERPL-SCNC: 135 MMOL/L (ref 136–145)
WBC # BLD AUTO: 3.8 K/UL (ref 3.9–12.7)

## 2023-05-19 PROCEDURE — 80053 COMPREHEN METABOLIC PANEL: CPT

## 2023-05-19 PROCEDURE — 25000003 PHARM REV CODE 250

## 2023-05-19 PROCEDURE — 36415 COLL VENOUS BLD VENIPUNCTURE: CPT

## 2023-05-19 PROCEDURE — 94761 N-INVAS EAR/PLS OXIMETRY MLT: CPT

## 2023-05-19 PROCEDURE — 85025 COMPLETE CBC W/AUTO DIFF WBC: CPT

## 2023-05-19 PROCEDURE — 99233 PR SUBSEQUENT HOSPITAL CARE,LEVL III: ICD-10-PCS | Mod: ,,, | Performed by: STUDENT IN AN ORGANIZED HEALTH CARE EDUCATION/TRAINING PROGRAM

## 2023-05-19 PROCEDURE — 63600175 PHARM REV CODE 636 W HCPCS

## 2023-05-19 PROCEDURE — 25000003 PHARM REV CODE 250: Performed by: STUDENT IN AN ORGANIZED HEALTH CARE EDUCATION/TRAINING PROGRAM

## 2023-05-19 PROCEDURE — 99233 SBSQ HOSP IP/OBS HIGH 50: CPT | Mod: ,,, | Performed by: STUDENT IN AN ORGANIZED HEALTH CARE EDUCATION/TRAINING PROGRAM

## 2023-05-19 PROCEDURE — 11000001 HC ACUTE MED/SURG PRIVATE ROOM

## 2023-05-19 RX ORDER — IBUPROFEN 400 MG/1
400 TABLET ORAL EVERY 6 HOURS PRN
Status: DISCONTINUED | OUTPATIENT
Start: 2023-05-19 | End: 2023-05-21 | Stop reason: HOSPADM

## 2023-05-19 RX ORDER — DIAZEPAM 5 MG/1
10 TABLET ORAL EVERY 12 HOURS
Status: CANCELLED | OUTPATIENT
Start: 2023-05-19

## 2023-05-19 RX ORDER — LORAZEPAM 1 MG/1
2 TABLET ORAL
Status: DISCONTINUED | OUTPATIENT
Start: 2023-05-19 | End: 2023-05-20

## 2023-05-19 RX ADMIN — LISINOPRIL 20 MG: 20 TABLET ORAL at 09:05

## 2023-05-19 RX ADMIN — FOLIC ACID 1 MG: 1 TABLET ORAL at 09:05

## 2023-05-19 RX ADMIN — LORAZEPAM 2 MG: 1 TABLET ORAL at 12:05

## 2023-05-19 RX ADMIN — HEPARIN SODIUM 5000 UNITS: 5000 INJECTION INTRAVENOUS; SUBCUTANEOUS at 06:05

## 2023-05-19 RX ADMIN — ACETAMINOPHEN 650 MG: 325 TABLET ORAL at 12:05

## 2023-05-19 RX ADMIN — DIAZEPAM 10 MG: 5 TABLET ORAL at 04:05

## 2023-05-19 RX ADMIN — DULOXETINE 90 MG: 30 CAPSULE, DELAYED RELEASE ORAL at 09:05

## 2023-05-19 RX ADMIN — Medication 6 MG: at 11:05

## 2023-05-19 RX ADMIN — LEVOTHYROXINE SODIUM 50 MCG: 50 TABLET ORAL at 06:05

## 2023-05-19 RX ADMIN — HEPARIN SODIUM 5000 UNITS: 5000 INJECTION INTRAVENOUS; SUBCUTANEOUS at 09:05

## 2023-05-19 RX ADMIN — HEPARIN SODIUM 5000 UNITS: 5000 INJECTION INTRAVENOUS; SUBCUTANEOUS at 04:05

## 2023-05-19 RX ADMIN — TRAZODONE HYDROCHLORIDE 50 MG: 50 TABLET ORAL at 11:05

## 2023-05-19 RX ADMIN — DIAZEPAM 10 MG: 5 TABLET ORAL at 09:05

## 2023-05-19 RX ADMIN — ACETAMINOPHEN 650 MG: 325 TABLET ORAL at 09:05

## 2023-05-19 RX ADMIN — THERA TABS 1 TABLET: TAB at 09:05

## 2023-05-19 RX ADMIN — LORAZEPAM 2 MG: 1 TABLET ORAL at 08:05

## 2023-05-19 RX ADMIN — THIAMINE HCL TAB 100 MG 100 MG: 100 TAB at 09:05

## 2023-05-19 RX ADMIN — ONDANSETRON 4 MG: 2 INJECTION INTRAMUSCULAR; INTRAVENOUS at 12:05

## 2023-05-19 RX ADMIN — LORAZEPAM 2 MG: 2 INJECTION INTRAMUSCULAR; INTRAVENOUS at 02:05

## 2023-05-19 RX ADMIN — DIAZEPAM 10 MG: 5 TABLET ORAL at 06:05

## 2023-05-19 RX ADMIN — ONDANSETRON 4 MG: 2 INJECTION INTRAMUSCULAR; INTRAVENOUS at 06:05

## 2023-05-19 RX ADMIN — IBUPROFEN 400 MG: 400 TABLET, FILM COATED ORAL at 06:05

## 2023-05-19 NOTE — PROGRESS NOTES
[facility] Medicine  Progress Note    Patient Name: Earl Abdul  MRN: [id number]  Patient Class: IP- Inpatient   Admission Date: 5/15/2023  Length of Stay: 4 days  Attending Physician: Luis Smith MD  Primary Care Provider: Andrew Rodriguez MD    Subjective:     Principal Problem:Alcoholic ketoacidosis    HPI:  62 year old female with medical history significant for  EtOH use disorder with history of prior seizures, depression with suicide attempt in 2017, breast cancer, HTN, HLD, fibromyalgia, sarcoidosis, hypothyroidism, T2DM, CLL (not on treatment) and COPD presenting with complaints of nausea and vomiting x 2 days associated with generalized weakness. She reports the last time she had a meal was 1 week ago but she has been drinking EtOH very heavily. She reports drinking 0.5 bottles of vodka daily and her last drink was this morning. Per EMS, her BG was reportedly 55 prior to arrival. She states that she has had diarrhea as well. She has had alcohol withdrawals including seizures before. She was recently admitted 4/25-4/20 for the same presentation of nausea and vomiting in setting of 2 weeks of heavy alcohol use. She was initaited on CIWA and treated with valium taper, no seizures during that admission. 3 weeks prior to that she was admitted for acute on chronic respiratory failure due to COPD with non-adherance to home inhalers. She also had a mechanical fall 2 days ago in which she hurt her eye lid. Denies fevers, chest pain, hematemesis or bleeding per rectum.     On arrival to [facility], she is AF, , /96, on 4L NC. WBC 20.11 (baseline tends to fluctuate between leukopenia and leukocytosis). Hgb 11.1, Plt 149, both improved from baseline. Na 131, K 5.1, Cl 90, HCO3 15, BUN 34, Cr 2.2 (bl 0.7). Glucose 46 on arrival, which improved to 234 with administration of D10. Lactate 2.5. CXR without acute cardiopulmonary process.         Overview/Hospital Course:  Patient  admitted with Alcoholic ketoacidosis and concerns for Alcohol withdrawal. The patient was started on a Valium taper with IV ativan PRN in place for CIWA >8. She continued to be nauseous, have multiple episodes of emesis, and complain of a headache for the first two days of admission, thus required extra PRN IV ativan doses. Urine Cx grew Klebsiella and pt was given 3 days of IV Ceftriaxone. Pt continuing to improve on diazepam taper.       Interval History:  Patient required 1 time Ativan overnight.  Continued on Valium taper. Significant improvement in withdrawal symptoms. Still endorsing nausea in addition to feeling deflated. PT/OT pending.      Review of Systems   Constitutional:  Positive for appetite change. Negative for chills, fatigue and fever.   HENT:  Negative for congestion and sinus pain.    Respiratory:  Negative for cough, shortness of breath and wheezing.    Cardiovascular:  Negative for chest pain, palpitations and leg swelling.   Gastrointestinal:  Positive for nausea and vomiting. Negative for abdominal distention, abdominal pain, blood in stool and diarrhea.   Genitourinary:  Negative for difficulty urinating and urgency.   Musculoskeletal:  Negative for back pain, myalgias and neck pain.   Skin:  Negative for rash and wound.   Neurological:  Positive for headaches. Negative for dizziness and syncope.   Hematological:  Bruises/bleeds easily.   Psychiatric/Behavioral:  Negative for agitation and behavioral problems. The patient is not nervous/anxious.     Objective:     Vital Signs (Most Recent):  Temp: 98.3 °F (36.8 °C) (05/19/23 1139)  Pulse: 82 (05/19/23 1139)  Resp: 18 (05/19/23 1139)  BP: (!) 147/71 (05/19/23 1139)  SpO2: (!) 94 % (05/19/23 1139) Vital Signs (24h Range):  Temp:  [97 °F (36.1 °C)-98.3 °F (36.8 °C)] 98.3 °F (36.8 °C)  Pulse:  [82-89] 82  Resp:  [18-20] 18  SpO2:  [92 %-98 %] 94 %  BP: (123-166)/(71-89) 147/71     Weight: 75.8 kg (167 lb 1.7 oz)  Body mass index is 26.97  kg/m².    Intake/Output Summary (Last 24 hours) at 5/19/2023 1434  Last data filed at 5/19/2023 0621  Gross per 24 hour   Intake 300 ml   Output --   Net 300 ml         Physical Exam  Vitals and nursing note reviewed.   HENT:      Head: No contusion or left periorbital erythema.   Eyes:      General: No scleral icterus.  Cardiovascular:      Rate and Rhythm: Regular rhythm. Tachycardia present.   Pulmonary:      Effort: Pulmonary effort is normal. No respiratory distress.      Breath sounds: No wheezing.   Abdominal:      General: Abdomen is flat. Bowel sounds are normal. There is no distension.      Tenderness: There is no abdominal tenderness.   Musculoskeletal:      Cervical back: Normal range of motion. No rigidity.      Right lower leg: No edema.      Left lower leg: No edema.   Skin:     Findings: No bruising.   Neurological:      Mental Status: She is alert and oriented to person, place, and time.      Comments: tremors present  Psychiatric:         Attention and Perception: Attention normal.         Mood and Affect: Mood is anxious and depressed.         Speech: Speech normal.         Behavior: Behavior normal.         Thought Content: Thought content normal.         Cognition and Memory: Cognition normal.         Judgment: Judgment normal.            Significant Labs: All pertinent labs within the past 24 hours have been reviewed.    Significant Imaging: I have reviewed all pertinent imaging results/findings within the past 24 hours.      Assessment/Plan:      * Alcoholic ketoacidosis  62-year-old female with significant alcohol use history and prior alcohol withdrawal seizures presenting with nausea and vomiting in the setting of heavy alcohol use and noted to have metabolic acidosis on presentation. Mild tremors noted. Given Valium 1x in the ED. Denies hematemesis or melena.    - Valium Q6H with planned taper  - CIWA protocol  - IV Ativan PRN for CIWA > 8  - F/U UDS, serum Ethanol, b-hydroxybutarate,  "PETH  - Thiamine, multivitamin  - IVF resuscitaion      Alcohol use disorder, severe, dependence  See "Alcoholic Ketoacidosis" Has attempted Rehab in the past. She is concerned her EtOH use is affecting her marriage.     - Addiction Psychiatry consulted; appreciate recommendations    CLL (chronic lymphocytic leukemia)  Established with Dr. Gonzalez. Not currently on treatment. Heme/onc appointment scheduled 5/17    - Outpatient Heme-Onc follow up      Malignant neoplasm of central portion of right breast in female, estrogen receptor positive  T1b N0 stage IA breast cancer diagnosed October 2020. S/p bilateral mastectomy with no adjuvant therapy; received Letrozole February 2021-may 2021        DEVANTE (acute kidney injury)  Patient with acute kidney injury likely due to IVVD/dehydration DEVANTE is currently stable. Labs reviewed- Renal function/electrolytes with Estimated Creatinine Clearance: 84.5 mL/min (based on SCr of 0.7 mg/dL). according to latest data. Monitor urine output and serial BMP and adjust therapy as needed. Avoid nephrotoxins and renally dose meds for GFR listed above.     - Creatinine 2.2 on admit, baseline around 0.6    Plan:   Lab Results   Component Value Date    CREATININE 0.7 05/18/2023     - Check urine lytes and UPCR  - IVF  - Avoid nephrotoxic agents such as NSAIDs, gadolinium and IV radiocontrast.  - Renally dose meds to current GFR.  - Maintain MAP > 65.    RESOLVED AFTER ADMINISTRATION OF IVFs        Essential hypertension  Home regimen: lisinopril 20mg    - HOLD in the setting of current normotension and recent DEVANTE    Fibromyalgia  - Home cymbalta      Hypomagnesemia  Mg 1.3 on presentation    - Replete PRN       Hyperlipidemia  Lipid panel 4/6/2023: , HDL 44, ,     - Not currently on treatment         Hypothyroidism  TSH 0.8 12/2022    - Continue home synthroid    Type 2 diabetes mellitus with hyperglycemia  Patient's FSGs are uncontrolled due to hypoglycemia on current " medication regimen.  Last A1c reviewed-   Lab Results   Component Value Date    HGBA1C 5.1 03/26/2023     Most recent fingerstick glucose reviewed-   Recent Labs   Lab 05/18/23  1617 05/18/23  2235 05/19/23  0737   POCTGLUCOSE 86 109 100     Current correctional scale  Low  Maintain anti-hyperglycemic dose as follows-   Antihyperglycemics (From admission, onward)    Start     Stop Route Frequency Ordered    05/15/23 1414  insulin aspart U-100 pen 0-5 Units         -- SubQ Before meals & nightly PRN 05/15/23 1321        Hold Oral hypoglycemics while patient is in the hospital. Home regimen: metformin 1000mg BID    - Initially hypoglycemic with improvement S/P D10 administration  - POCT    COPD (chronic obstructive pulmonary disease)  Home regimen: Breo, combivent      Depression  Patient has persistent depression which is unknown and is currently controlled. Will Continue anti-depressant medications. We will not consult psychiatry at this time. Patient does not display psychosis at this time. Continue to monitor closely and adjust plan of care as needed.    - Cymbalta      Obesity (BMI 30.0-34.9)  General weight loss/lifestyle modification strategies discussed (elicit support from others; identify saboteurs; non-food rewards, etc).        Sarcoidosis  Patient with documented history of sarcoidosis.  Not currently on treatment.      Alcohol withdrawal syndrome with complication  Status post IV Valium x 2 and Ativan    - Continue Diazepam Q8H  - Will taper accordingly  - Give additional IV Ativan 2 mg PRN if CIWA >8      VTE Risk Mitigation (From admission, onward)         Ordered     heparin (porcine) injection 5,000 Units  Every 8 hours         05/15/23 1518     IP VTE HIGH RISK PATIENT  Once         05/15/23 1518     Place sequential compression device  Until discontinued         05/15/23 1320                Discharge Planning   BECCA: 5/18/2023     Code Status: Full Code   Is the patient medically ready for  discharge?: No    Reason for patient still in hospital (select all that apply): Patient trending condition  Discharge Plan A: Home, Home with family          King Wallace MD  Internal Medicine, PGY-1  [facility]

## 2023-05-19 NOTE — ASSESSMENT & PLAN NOTE
Patient has persistent depression which is unknown and is currently controlled. Will Continue anti-depressant medications. We will not consult psychiatry at this time. Patient does not display psychosis at this time. Continue to monitor closely and adjust plan of care as needed.    - Cymbalta

## 2023-05-19 NOTE — PROGRESS NOTES
Patient having difficulty maintaining a peripheral IV. Placed a midline team consult and notified Med Team 1 as well as day shift nurse.

## 2023-05-19 NOTE — ASSESSMENT & PLAN NOTE
Patient's FSGs are uncontrolled due to hypoglycemia on current medication regimen.  Last A1c reviewed-   Lab Results   Component Value Date    HGBA1C 5.1 03/26/2023     Most recent fingerstick glucose reviewed-   Recent Labs   Lab 05/18/23  1617 05/18/23  2235 05/19/23  0737   POCTGLUCOSE 86 109 100     Current correctional scale  Low  Maintain anti-hyperglycemic dose as follows-   Antihyperglycemics (From admission, onward)    Start     Stop Route Frequency Ordered    05/15/23 1414  insulin aspart U-100 pen 0-5 Units         -- SubQ Before meals & nightly PRN 05/15/23 1321        Hold Oral hypoglycemics while patient is in the hospital. Home regimen: metformin 1000mg BID    - Initially hypoglycemic with improvement S/P D10 administration  - POCT

## 2023-05-19 NOTE — SUBJECTIVE & OBJECTIVE
Interval History:  Patient required 1 time Ativan overnight.  Continued on Valium taper. Significant improvement in withdrawal symptoms. Still endorsing nausea in addition to feeling deflated. PT/OT pending.      Review of Systems   Constitutional:  Positive for appetite change. Negative for chills, fatigue and fever.   HENT:  Negative for congestion and sinus pain.    Respiratory:  Negative for cough, shortness of breath and wheezing.    Cardiovascular:  Negative for chest pain, palpitations and leg swelling.   Gastrointestinal:  Positive for nausea and vomiting. Negative for abdominal distention, abdominal pain, blood in stool and diarrhea.   Genitourinary:  Negative for difficulty urinating and urgency.   Musculoskeletal:  Negative for back pain, myalgias and neck pain.   Skin:  Negative for rash and wound.   Neurological:  Positive for headaches. Negative for dizziness and syncope.   Hematological:  Bruises/bleeds easily.   Psychiatric/Behavioral:  Negative for agitation and behavioral problems. The patient is not nervous/anxious.     Objective:     Vital Signs (Most Recent):  Temp: 98.3 °F (36.8 °C) (05/19/23 1139)  Pulse: 82 (05/19/23 1139)  Resp: 18 (05/19/23 1139)  BP: (!) 147/71 (05/19/23 1139)  SpO2: (!) 94 % (05/19/23 1139) Vital Signs (24h Range):  Temp:  [97 °F (36.1 °C)-98.3 °F (36.8 °C)] 98.3 °F (36.8 °C)  Pulse:  [82-89] 82  Resp:  [18-20] 18  SpO2:  [92 %-98 %] 94 %  BP: (123-166)/(71-89) 147/71     Weight: 75.8 kg (167 lb 1.7 oz)  Body mass index is 26.97 kg/m².    Intake/Output Summary (Last 24 hours) at 5/19/2023 1434  Last data filed at 5/19/2023 0621  Gross per 24 hour   Intake 300 ml   Output --   Net 300 ml         Physical Exam  Vitals and nursing note reviewed.   HENT:      Head: No contusion or left periorbital erythema.   Eyes:      General: No scleral icterus.  Cardiovascular:      Rate and Rhythm: Regular rhythm. Tachycardia present.   Pulmonary:      Effort: Pulmonary effort is  normal. No respiratory distress.      Breath sounds: No wheezing.   Abdominal:      General: Abdomen is flat. Bowel sounds are normal. There is no distension.      Tenderness: There is no abdominal tenderness.   Musculoskeletal:      Cervical back: Normal range of motion. No rigidity.      Right lower leg: No edema.      Left lower leg: No edema.   Skin:     Findings: No bruising.   Neurological:      Mental Status: She is alert and oriented to person, place, and time.      Comments: tremors present  Psychiatric:         Attention and Perception: Attention normal.         Mood and Affect: Mood is anxious and depressed.         Speech: Speech normal.         Behavior: Behavior normal.         Thought Content: Thought content normal.         Cognition and Memory: Cognition normal.         Judgment: Judgment normal.            Significant Labs: All pertinent labs within the past 24 hours have been reviewed.    Significant Imaging: I have reviewed all pertinent imaging results/findings within the past 24 hours.

## 2023-05-19 NOTE — PLAN OF CARE
Problem: Violence Risk or Actual  Goal: Anger and Impulse Control  Outcome: Ongoing, Progressing     Problem: Adult Inpatient Plan of Care  Goal: Plan of Care Review  Outcome: Ongoing, Progressing  Goal: Patient-Specific Goal (Individualized)  Outcome: Ongoing, Progressing  Goal: Absence of Hospital-Acquired Illness or Injury  Outcome: Ongoing, Progressing  Goal: Optimal Comfort and Wellbeing  Outcome: Ongoing, Progressing  Goal: Readiness for Transition of Care  Outcome: Ongoing, Progressing     Problem: Diabetes Comorbidity  Goal: Blood Glucose Level Within Targeted Range  Outcome: Ongoing, Progressing     Problem: Fluid and Electrolyte Imbalance (Acute Kidney Injury/Impairment)  Goal: Fluid and Electrolyte Balance  Outcome: Ongoing, Progressing     Problem: Oral Intake Inadequate (Acute Kidney Injury/Impairment)  Goal: Optimal Nutrition Intake  Outcome: Ongoing, Progressing     Problem: Renal Function Impairment (Acute Kidney Injury/Impairment)  Goal: Effective Renal Function  Outcome: Ongoing, Progressing     Problem: Fall Injury Risk  Goal: Absence of Fall and Fall-Related Injury  Outcome: Ongoing, Progressing       Pt AAO x 4. Patient rested comfortably with no significant changes during the shift, remains free from falls and injuries. Side rails up x2, bed low and locked, call light and personal belongings within reach. VS remain stable. Questions and concerns addressed and answered. Medication compliance. Will continue to monitor.

## 2023-05-20 LAB
ANION GAP SERPL CALC-SCNC: 9 MMOL/L (ref 8–16)
ANISOCYTOSIS BLD QL SMEAR: ABNORMAL
BASO STIPL BLD QL SMEAR: ABNORMAL
BASOPHILS # BLD AUTO: 0.01 K/UL (ref 0–0.2)
BASOPHILS NFR BLD: 0.2 % (ref 0–1.9)
BUN SERPL-MCNC: 4 MG/DL (ref 8–23)
CALCIUM SERPL-MCNC: 8.8 MG/DL (ref 8.7–10.5)
CHLORIDE SERPL-SCNC: 100 MMOL/L (ref 95–110)
CO2 SERPL-SCNC: 28 MMOL/L (ref 23–29)
CREAT SERPL-MCNC: 0.8 MG/DL (ref 0.5–1.4)
DIFFERENTIAL METHOD: ABNORMAL
EOSINOPHIL # BLD AUTO: 0.1 K/UL (ref 0–0.5)
EOSINOPHIL NFR BLD: 2.1 % (ref 0–8)
ERYTHROCYTE [DISTWIDTH] IN BLOOD BY AUTOMATED COUNT: 18 % (ref 11.5–14.5)
EST. GFR  (NO RACE VARIABLE): >60 ML/MIN/1.73 M^2
GLUCOSE SERPL-MCNC: 102 MG/DL (ref 70–110)
HCT VFR BLD AUTO: 33.9 % (ref 37–48.5)
HGB BLD-MCNC: 10.3 G/DL (ref 12–16)
IMM GRANULOCYTES # BLD AUTO: 0.01 K/UL (ref 0–0.04)
IMM GRANULOCYTES NFR BLD AUTO: 0.2 % (ref 0–0.5)
LYMPHOCYTES # BLD AUTO: 2.7 K/UL (ref 1–4.8)
LYMPHOCYTES NFR BLD: 64.5 % (ref 18–48)
MAGNESIUM SERPL-MCNC: 1.3 MG/DL (ref 1.6–2.6)
MCH RBC QN AUTO: 27.7 PG (ref 27–31)
MCHC RBC AUTO-ENTMCNC: 30.4 G/DL (ref 32–36)
MCV RBC AUTO: 91 FL (ref 82–98)
MONOCYTES # BLD AUTO: 0.4 K/UL (ref 0.3–1)
MONOCYTES NFR BLD: 9.7 % (ref 4–15)
NEUTROPHILS # BLD AUTO: 1 K/UL (ref 1.8–7.7)
NEUTROPHILS NFR BLD: 23.3 % (ref 38–73)
NRBC BLD-RTO: 1 /100 WBC
OVALOCYTES BLD QL SMEAR: ABNORMAL
PHOSPHATE SERPL-MCNC: 3.5 MG/DL (ref 2.7–4.5)
PLATELET # BLD AUTO: 69 K/UL (ref 150–450)
PMV BLD AUTO: 11.4 FL (ref 9.2–12.9)
POCT GLUCOSE: 109 MG/DL (ref 70–110)
POCT GLUCOSE: 128 MG/DL (ref 70–110)
POCT GLUCOSE: 89 MG/DL (ref 70–110)
POIKILOCYTOSIS BLD QL SMEAR: SLIGHT
POLYCHROMASIA BLD QL SMEAR: ABNORMAL
POTASSIUM SERPL-SCNC: 4.3 MMOL/L (ref 3.5–5.1)
RBC # BLD AUTO: 3.72 M/UL (ref 4–5.4)
SCHISTOCYTES BLD QL SMEAR: ABNORMAL
SMUDGE CELLS BLD QL SMEAR: PRESENT
SODIUM SERPL-SCNC: 137 MMOL/L (ref 136–145)
WBC # BLD AUTO: 4.22 K/UL (ref 3.9–12.7)

## 2023-05-20 PROCEDURE — 83735 ASSAY OF MAGNESIUM: CPT

## 2023-05-20 PROCEDURE — 80048 BASIC METABOLIC PNL TOTAL CA: CPT

## 2023-05-20 PROCEDURE — 11000001 HC ACUTE MED/SURG PRIVATE ROOM

## 2023-05-20 PROCEDURE — 25000003 PHARM REV CODE 250

## 2023-05-20 PROCEDURE — 63600175 PHARM REV CODE 636 W HCPCS

## 2023-05-20 PROCEDURE — 84100 ASSAY OF PHOSPHORUS: CPT

## 2023-05-20 PROCEDURE — 85025 COMPLETE CBC W/AUTO DIFF WBC: CPT

## 2023-05-20 PROCEDURE — 99233 PR SUBSEQUENT HOSPITAL CARE,LEVL III: ICD-10-PCS | Mod: ,,, | Performed by: STUDENT IN AN ORGANIZED HEALTH CARE EDUCATION/TRAINING PROGRAM

## 2023-05-20 PROCEDURE — 99233 SBSQ HOSP IP/OBS HIGH 50: CPT | Mod: ,,, | Performed by: STUDENT IN AN ORGANIZED HEALTH CARE EDUCATION/TRAINING PROGRAM

## 2023-05-20 PROCEDURE — 36415 COLL VENOUS BLD VENIPUNCTURE: CPT

## 2023-05-20 PROCEDURE — 25000003 PHARM REV CODE 250: Performed by: STUDENT IN AN ORGANIZED HEALTH CARE EDUCATION/TRAINING PROGRAM

## 2023-05-20 RX ORDER — TRAZODONE HYDROCHLORIDE 100 MG/1
300 TABLET ORAL NIGHTLY
Status: DISCONTINUED | OUTPATIENT
Start: 2023-05-20 | End: 2023-05-21 | Stop reason: HOSPADM

## 2023-05-20 RX ORDER — DIAZEPAM 5 MG/1
10 TABLET ORAL EVERY 12 HOURS
Status: COMPLETED | OUTPATIENT
Start: 2023-05-20 | End: 2023-05-20

## 2023-05-20 RX ORDER — LORAZEPAM 1 MG/1
2 TABLET ORAL EVERY 4 HOURS PRN
Status: DISCONTINUED | OUTPATIENT
Start: 2023-05-20 | End: 2023-05-21 | Stop reason: HOSPADM

## 2023-05-20 RX ORDER — MAGNESIUM SULFATE HEPTAHYDRATE 40 MG/ML
2 INJECTION, SOLUTION INTRAVENOUS ONCE
Status: COMPLETED | OUTPATIENT
Start: 2023-05-20 | End: 2023-05-20

## 2023-05-20 RX ORDER — NALTREXONE HYDROCHLORIDE 50 MG/1
50 TABLET, FILM COATED ORAL DAILY
Status: DISCONTINUED | OUTPATIENT
Start: 2023-05-21 | End: 2023-05-21 | Stop reason: HOSPADM

## 2023-05-20 RX ORDER — DIAZEPAM 5 MG/1
10 TABLET ORAL EVERY 12 HOURS
Status: DISCONTINUED | OUTPATIENT
Start: 2023-05-20 | End: 2023-05-20

## 2023-05-20 RX ADMIN — LEVOTHYROXINE SODIUM 50 MCG: 50 TABLET ORAL at 05:05

## 2023-05-20 RX ADMIN — HEPARIN SODIUM 5000 UNITS: 5000 INJECTION INTRAVENOUS; SUBCUTANEOUS at 02:05

## 2023-05-20 RX ADMIN — HEPARIN SODIUM 5000 UNITS: 5000 INJECTION INTRAVENOUS; SUBCUTANEOUS at 10:05

## 2023-05-20 RX ADMIN — DIAZEPAM 10 MG: 5 TABLET ORAL at 05:05

## 2023-05-20 RX ADMIN — THERA TABS 1 TABLET: TAB at 08:05

## 2023-05-20 RX ADMIN — DIAZEPAM 10 MG: 5 TABLET ORAL at 08:05

## 2023-05-20 RX ADMIN — ACETAMINOPHEN 650 MG: 325 TABLET ORAL at 08:05

## 2023-05-20 RX ADMIN — MAGNESIUM SULFATE 2 G: 2 INJECTION INTRAVENOUS at 08:05

## 2023-05-20 RX ADMIN — THIAMINE HCL TAB 100 MG 100 MG: 100 TAB at 08:05

## 2023-05-20 RX ADMIN — TRAZODONE HYDROCHLORIDE 300 MG: 100 TABLET ORAL at 10:05

## 2023-05-20 RX ADMIN — FOLIC ACID 1 MG: 1 TABLET ORAL at 08:05

## 2023-05-20 RX ADMIN — LORAZEPAM 2 MG: 1 TABLET ORAL at 02:05

## 2023-05-20 RX ADMIN — Medication 6 MG: at 10:05

## 2023-05-20 RX ADMIN — IBUPROFEN 400 MG: 400 TABLET, FILM COATED ORAL at 10:05

## 2023-05-20 RX ADMIN — HEPARIN SODIUM 5000 UNITS: 5000 INJECTION INTRAVENOUS; SUBCUTANEOUS at 05:05

## 2023-05-20 RX ADMIN — DULOXETINE 90 MG: 30 CAPSULE, DELAYED RELEASE ORAL at 08:05

## 2023-05-20 RX ADMIN — LISINOPRIL 20 MG: 20 TABLET ORAL at 08:05

## 2023-05-20 RX ADMIN — LORAZEPAM 2 MG: 1 TABLET ORAL at 08:05

## 2023-05-20 NOTE — ASSESSMENT & PLAN NOTE
- Will decrease Valium taper to Q12H  - Will taper accordingly  - Give additional IV Ativan 2 mg PRN if CIWA >8

## 2023-05-20 NOTE — SUBJECTIVE & OBJECTIVE
Interval History: NAEON. Patient required 1x valium overnight. Continued on valium taper. Addiction psychiatry following.    Review of Systems   Constitutional:  Positive for appetite change. Negative for chills, fatigue and fever.   HENT:  Negative for congestion and sinus pain.    Respiratory:  Negative for cough, shortness of breath and wheezing.    Cardiovascular:  Negative for chest pain, palpitations and leg swelling.   Gastrointestinal:  Positive for nausea and vomiting. Negative for abdominal distention, abdominal pain, blood in stool and diarrhea.   Genitourinary:  Negative for difficulty urinating and urgency.   Musculoskeletal:  Negative for back pain, myalgias and neck pain.   Skin:  Negative for rash and wound.   Neurological:  Positive for headaches. Negative for dizziness and syncope.   Hematological:  Bruises/bleeds easily.   Psychiatric/Behavioral:  Negative for agitation and behavioral problems. The patient is not nervous/anxious.      Objective:     Vital Signs (Most Recent):  Temp: 97.9 °F (36.6 °C) (05/20/23 0725)  Pulse: 81 (05/20/23 0725)  Resp: 19 (05/20/23 0725)  BP: (!) 147/71 (05/20/23 0725)  SpO2: 99 % (05/20/23 0725) Vital Signs (24h Range):  Temp:  [97.8 °F (36.6 °C)-98.9 °F (37.2 °C)] 97.9 °F (36.6 °C)  Pulse:  [79-92] 81  Resp:  [16-19] 19  SpO2:  [94 %-99 %] 99 %  BP: (132-176)/(64-86) 147/71     Weight: 75.8 kg (167 lb 1.7 oz)  Body mass index is 26.97 kg/m².  No intake or output data in the 24 hours ending 05/20/23 0829      Physical Exam  Vitals and nursing note reviewed.   HENT:      Head: No contusion or left periorbital erythema.   Eyes:      General: No scleral icterus.  Cardiovascular:      Rate and Rhythm: Regular rhythm. Tachycardia present.   Pulmonary:      Effort: Pulmonary effort is normal. No respiratory distress.      Breath sounds: No wheezing.   Abdominal:      General: Abdomen is flat. Bowel sounds are normal. There is no distension.      Tenderness: There is no  abdominal tenderness.   Musculoskeletal:      Cervical back: Normal range of motion. No rigidity.      Right lower leg: No edema.      Left lower leg: No edema.   Skin:     Findings: No bruising.   Neurological:      Mental Status: She is alert and oriented to person, place, and time.      Comments: tremors present  Psychiatric:         Attention and Perception: Attention normal.         Mood and Affect: Mood is anxious and depressed.         Speech: Speech normal.         Behavior: Behavior normal.         Thought Content: Thought content normal.         Cognition and Memory: Cognition normal.         Judgment: Judgment normal.            Significant Labs: All pertinent labs within the past 24 hours have been reviewed.    Significant Imaging: I have reviewed all pertinent imaging results/findings within the past 24 hours.

## 2023-05-20 NOTE — ASSESSMENT & PLAN NOTE
62-year-old female with significant alcohol use history and prior alcohol withdrawal seizures presenting with nausea and vomiting in the setting of heavy alcohol use and noted to have metabolic acidosis on presentation. Mild tremors noted. Given Valium 1x in the ED. Denies hematemesis or melena.    - Valium taper  - CIWA protocol  - IV Ativan PRN for CIWA > 8  - F/U UDS, serum Ethanol, b-hydroxybutarate, PETH  - Thiamine, multivitamin  - IVF resuscitaion

## 2023-05-20 NOTE — ASSESSMENT & PLAN NOTE
Established with Dr. Gonzalez. Not currently on treatment. Heme/onc appointment scheduled 5/17.  Chronic thrombocytopenia noted.  Possibly associated with alcohol use.    - Outpatient Heme-Onc follow up

## 2023-05-20 NOTE — PROGRESS NOTES
[facility] Medicine  Progress Note    Patient Name: Earl Abdul  MRN: [id number]  Patient Class: IP- Inpatient   Admission Date: 5/15/2023  Length of Stay: 5 days  Attending Physician: Luis Smith MD  Primary Care Provider: Andrew Rodriguez MD    Subjective:     Principal Problem:Alcoholic ketoacidosis    HPI:  62 year old female with medical history significant for  EtOH use disorder with history of prior seizures, depression with suicide attempt in 2017, breast cancer, HTN, HLD, fibromyalgia, sarcoidosis, hypothyroidism, T2DM, CLL (not on treatment) and COPD presenting with complaints of nausea and vomiting x 2 days associated with generalized weakness. She reports the last time she had a meal was 1 week ago but she has been drinking EtOH very heavily. She reports drinking 0.5 bottles of vodka daily and her last drink was this morning. Per EMS, her BG was reportedly 55 prior to arrival. She states that she has had diarrhea as well. She has had alcohol withdrawals including seizures before. She was recently admitted 4/25-4/20 for the same presentation of nausea and vomiting in setting of 2 weeks of heavy alcohol use. She was initaited on CIWA and treated with valium taper, no seizures during that admission. 3 weeks prior to that she was admitted for acute on chronic respiratory failure due to COPD with non-adherance to home inhalers. She also had a mechanical fall 2 days ago in which she hurt her eye lid. Denies fevers, chest pain, hematemesis or bleeding per rectum.     On arrival to [facility] [place], she is AF, , /96, on 4L NC. WBC 20.11 (baseline tends to fluctuate between leukopenia and leukocytosis). Hgb 11.1, Plt 149, both improved from baseline. Na 131, K 5.1, Cl 90, HCO3 15, BUN 34, Cr 2.2 (bl 0.7). Glucose 46 on arrival, which improved to 234 with administration of D10. Lactate 2.5. CXR without acute cardiopulmonary process.         Overview/Hospital Course:  Patient  admitted with Alcoholic ketoacidosis and concerns for Alcohol withdrawal. The patient was started on a Valium taper with IV ativan PRN in place for CIWA >8. She continued to be nauseous, have multiple episodes of emesis, and complain of a headache for the first two days of admission, thus required extra PRN IV ativan doses. Urine Cx grew Klebsiella and pt was given 3 days of IV Ceftriaxone. Pt continuing to improve on diazepam taper.       Interval History: NAEON. Patient required 1x valium overnight. Continued on valium taper. Addiction psychiatry following.    Review of Systems   Constitutional:  Positive for appetite change. Negative for chills, fatigue and fever.   HENT:  Negative for congestion and sinus pain.    Respiratory:  Negative for cough, shortness of breath and wheezing.    Cardiovascular:  Negative for chest pain, palpitations and leg swelling.   Gastrointestinal:  Positive for nausea and vomiting. Negative for abdominal distention, abdominal pain, blood in stool and diarrhea.   Genitourinary:  Negative for difficulty urinating and urgency.   Musculoskeletal:  Negative for back pain, myalgias and neck pain.   Skin:  Negative for rash and wound.   Neurological:  Positive for headaches. Negative for dizziness and syncope.   Hematological:  Bruises/bleeds easily.   Psychiatric/Behavioral:  Negative for agitation and behavioral problems. The patient is not nervous/anxious.      Objective:     Vital Signs (Most Recent):  Temp: 97.9 °F (36.6 °C) (05/20/23 0725)  Pulse: 81 (05/20/23 0725)  Resp: 19 (05/20/23 0725)  BP: (!) 147/71 (05/20/23 0725)  SpO2: 99 % (05/20/23 0725) Vital Signs (24h Range):  Temp:  [97.8 °F (36.6 °C)-98.9 °F (37.2 °C)] 97.9 °F (36.6 °C)  Pulse:  [79-92] 81  Resp:  [16-19] 19  SpO2:  [94 %-99 %] 99 %  BP: (132-176)/(64-86) 147/71     Weight: 75.8 kg (167 lb 1.7 oz)  Body mass index is 26.97 kg/m².  No intake or output data in the 24 hours ending 05/20/23 0829      Physical Exam  Vitals  "and nursing note reviewed.   HENT:      Head: No contusion or left periorbital erythema.   Eyes:      General: No scleral icterus.  Cardiovascular:      Rate and Rhythm: Regular rhythm. Tachycardia present.   Pulmonary:      Effort: Pulmonary effort is normal. No respiratory distress.      Breath sounds: No wheezing.   Abdominal:      General: Abdomen is flat. Bowel sounds are normal. There is no distension.      Tenderness: There is no abdominal tenderness.   Musculoskeletal:      Cervical back: Normal range of motion. No rigidity.      Right lower leg: No edema.      Left lower leg: No edema.   Skin:     Findings: No bruising.   Neurological:      Mental Status: She is alert and oriented to person, place, and time.      Comments: tremors present  Psychiatric:         Attention and Perception: Attention normal.         Mood and Affect: Mood is anxious and depressed.         Speech: Speech normal.         Behavior: Behavior normal.         Thought Content: Thought content normal.         Cognition and Memory: Cognition normal.         Judgment: Judgment normal.            Significant Labs: All pertinent labs within the past 24 hours have been reviewed.    Significant Imaging: I have reviewed all pertinent imaging results/findings within the past 24 hours.      Assessment/Plan:      * Alcoholic ketoacidosis  62-year-old female with significant alcohol use history and prior alcohol withdrawal seizures presenting with nausea and vomiting in the setting of heavy alcohol use and noted to have metabolic acidosis on presentation. Mild tremors noted. Given Valium 1x in the ED. Denies hematemesis or melena.    - Valium taper  - CIWA protocol  - IV Ativan PRN for CIWA > 8  - F/U UDS, serum Ethanol, b-hydroxybutarate, PETH  - Thiamine, multivitamin  - IVF resuscitaion      Alcohol use disorder, severe, dependence  See "Alcoholic Ketoacidosis" Has attempted Rehab in the past. She is concerned her EtOH use is affecting her " marriage.     - Addiction Psychiatry consulted; appreciate recommendations    CLL (chronic lymphocytic leukemia)  Established with Dr. Gonzalez. Not currently on treatment. Heme/onc appointment scheduled 5/17.  Chronic thrombocytopenia noted.  Possibly associated with alcohol use.    - Outpatient Heme-Onc follow up      Malignant neoplasm of central portion of right breast in female, estrogen receptor positive  T1b N0 stage IA breast cancer diagnosed October 2020. S/p bilateral mastectomy with no adjuvant therapy; received Letrozole February 2021-may 2021        DEVANTE (acute kidney injury)  Patient with acute kidney injury likely due to IVVD/dehydration DEVANTE is currently stable. Labs reviewed- Renal function/electrolytes with Estimated Creatinine Clearance: 84.5 mL/min (based on SCr of 0.7 mg/dL). according to latest data. Monitor urine output and serial BMP and adjust therapy as needed. Avoid nephrotoxins and renally dose meds for GFR listed above.     - Creatinine 2.2 on admit, baseline around 0.6    Plan:   Lab Results   Component Value Date    CREATININE 0.7 05/18/2023     - Check urine lytes and UPCR  - IVF  - Avoid nephrotoxic agents such as NSAIDs, gadolinium and IV radiocontrast.  - Renally dose meds to current GFR.  - Maintain MAP > 65.    RESOLVED AFTER ADMINISTRATION OF IVFs        Essential hypertension  Home regimen: lisinopril 20mg    - HOLD in the setting of current normotension and recent DEVANTE    Fibromyalgia  - Home cymbalta      Hypomagnesemia  Mg 1.3 on presentation    - Replete PRN       Hyperlipidemia  Lipid panel 4/6/2023: , HDL 44, ,     - Not currently on treatment         Hypothyroidism  TSH 0.8 12/2022    - Continue home synthroid    Type 2 diabetes mellitus with hyperglycemia  Patient's FSGs are uncontrolled due to hypoglycemia on current medication regimen.  Last A1c reviewed-   Lab Results   Component Value Date    HGBA1C 5.1 03/26/2023     Most recent fingerstick glucose  reviewed-   Recent Labs   Lab 05/18/23  1617 05/18/23  2235 05/19/23  0737   POCTGLUCOSE 86 109 100     Current correctional scale  Low  Maintain anti-hyperglycemic dose as follows-   Antihyperglycemics (From admission, onward)    Start     Stop Route Frequency Ordered    05/15/23 1414  insulin aspart U-100 pen 0-5 Units         -- SubQ Before meals & nightly PRN 05/15/23 1321        Hold Oral hypoglycemics while patient is in the hospital. Home regimen: metformin 1000mg BID    - Initially hypoglycemic with improvement S/P D10 administration  - POCT    COPD (chronic obstructive pulmonary disease)  Home regimen: Breo, combivent      Depression  Patient has persistent depression which is unknown and is currently controlled. Will Continue anti-depressant medications. We will not consult psychiatry at this time. Patient does not display psychosis at this time. Continue to monitor closely and adjust plan of care as needed.    - Cymbalta      Obesity (BMI 30.0-34.9)  General weight loss/lifestyle modification strategies discussed (elicit support from others; identify saboteurs; non-food rewards, etc).        Sarcoidosis  Patient with documented history of sarcoidosis.  Not currently on treatment.      Alcohol withdrawal syndrome with complication    - Will decrease Valium taper to Q12H  - Will taper accordingly  - Give additional IV Ativan 2 mg PRN if CIWA >8      VTE Risk Mitigation (From admission, onward)         Ordered     heparin (porcine) injection 5,000 Units  Every 8 hours         05/15/23 1518     IP VTE HIGH RISK PATIENT  Once         05/15/23 1518     Place sequential compression device  Until discontinued         05/15/23 1320                Discharge Planning   BECCA: 5/21/2023     Code Status: Full Code   Is the patient medically ready for discharge?: No    Reason for patient still in hospital (select all that apply): Patient trending condition  Discharge Plan A: Home, Home with family          King Wallace  MD  Internal Medicine, PGY-1  [facility]

## 2023-05-20 NOTE — PT/OT/SLP PROGRESS
Occupational Therapy      Patient Name:  Earl Abdul   MRN:  [id number]    Patient not seen today secondary to Patient unwilling to participate. Evaluation initiated with patient encountered in hospital room returning to bed from bathroom independently, managing own IV line. Pt appeared unsteady and advised to return to bed and call for RN for help in the future. RN informed. Pt provided background information, and then asked OT to discontinue evaluation.  Will follow-up 5/21/23.    Background: Lives with spouse in 2SH, 12STE upstairs with RHR to bed/ba. Has WIS and separate tub, prefers baths. No DME.     5/20/2023

## 2023-05-21 VITALS
TEMPERATURE: 98 F | RESPIRATION RATE: 14 BRPM | BODY MASS INDEX: 26.86 KG/M2 | DIASTOLIC BLOOD PRESSURE: 58 MMHG | SYSTOLIC BLOOD PRESSURE: 106 MMHG | OXYGEN SATURATION: 91 % | HEART RATE: 81 BPM | WEIGHT: 167.13 LBS | HEIGHT: 66 IN

## 2023-05-21 LAB
ANION GAP SERPL CALC-SCNC: 8 MMOL/L (ref 8–16)
ANISOCYTOSIS BLD QL SMEAR: SLIGHT
BASOPHILS # BLD AUTO: 0.01 K/UL (ref 0–0.2)
BASOPHILS NFR BLD: 0.3 % (ref 0–1.9)
BUN SERPL-MCNC: 5 MG/DL (ref 8–23)
CALCIUM SERPL-MCNC: 9 MG/DL (ref 8.7–10.5)
CHLORIDE SERPL-SCNC: 104 MMOL/L (ref 95–110)
CO2 SERPL-SCNC: 24 MMOL/L (ref 23–29)
CREAT SERPL-MCNC: 0.8 MG/DL (ref 0.5–1.4)
DIFFERENTIAL METHOD: ABNORMAL
EOSINOPHIL # BLD AUTO: 0.1 K/UL (ref 0–0.5)
EOSINOPHIL NFR BLD: 2.4 % (ref 0–8)
ERYTHROCYTE [DISTWIDTH] IN BLOOD BY AUTOMATED COUNT: 18.3 % (ref 11.5–14.5)
EST. GFR  (NO RACE VARIABLE): >60 ML/MIN/1.73 M^2
GLUCOSE SERPL-MCNC: 140 MG/DL (ref 70–110)
HCT VFR BLD AUTO: 36.2 % (ref 37–48.5)
HGB BLD-MCNC: 11 G/DL (ref 12–16)
HYPOCHROMIA BLD QL SMEAR: ABNORMAL
IMM GRANULOCYTES # BLD AUTO: 0.01 K/UL (ref 0–0.04)
IMM GRANULOCYTES NFR BLD AUTO: 0.3 % (ref 0–0.5)
LYMPHOCYTES # BLD AUTO: 2.3 K/UL (ref 1–4.8)
LYMPHOCYTES NFR BLD: 62.2 % (ref 18–48)
MAGNESIUM SERPL-MCNC: 1.6 MG/DL (ref 1.6–2.6)
MCH RBC QN AUTO: 27.7 PG (ref 27–31)
MCHC RBC AUTO-ENTMCNC: 30.4 G/DL (ref 32–36)
MCV RBC AUTO: 91 FL (ref 82–98)
MONOCYTES # BLD AUTO: 0.5 K/UL (ref 0.3–1)
MONOCYTES NFR BLD: 14.4 % (ref 4–15)
NEUTROPHILS # BLD AUTO: 0.8 K/UL (ref 1.8–7.7)
NEUTROPHILS NFR BLD: 20.4 % (ref 38–73)
NRBC BLD-RTO: 0 /100 WBC
OVALOCYTES BLD QL SMEAR: ABNORMAL
PHOSPHATE SERPL-MCNC: 3.9 MG/DL (ref 2.7–4.5)
PLATELET # BLD AUTO: 71 K/UL (ref 150–450)
PMV BLD AUTO: 11.2 FL (ref 9.2–12.9)
POCT GLUCOSE: 102 MG/DL (ref 70–110)
POCT GLUCOSE: 161 MG/DL (ref 70–110)
POCT GLUCOSE: 161 MG/DL (ref 70–110)
POCT GLUCOSE: 91 MG/DL (ref 70–110)
POIKILOCYTOSIS BLD QL SMEAR: SLIGHT
POLYCHROMASIA BLD QL SMEAR: ABNORMAL
POTASSIUM SERPL-SCNC: 4.7 MMOL/L (ref 3.5–5.1)
RBC # BLD AUTO: 3.97 M/UL (ref 4–5.4)
SMUDGE CELLS BLD QL SMEAR: PRESENT
SODIUM SERPL-SCNC: 136 MMOL/L (ref 136–145)
SPHEROCYTES BLD QL SMEAR: ABNORMAL
WBC # BLD AUTO: 3.76 K/UL (ref 3.9–12.7)

## 2023-05-21 PROCEDURE — 80048 BASIC METABOLIC PNL TOTAL CA: CPT

## 2023-05-21 PROCEDURE — 36415 COLL VENOUS BLD VENIPUNCTURE: CPT | Performed by: STUDENT IN AN ORGANIZED HEALTH CARE EDUCATION/TRAINING PROGRAM

## 2023-05-21 PROCEDURE — 63600175 PHARM REV CODE 636 W HCPCS

## 2023-05-21 PROCEDURE — 36415 COLL VENOUS BLD VENIPUNCTURE: CPT

## 2023-05-21 PROCEDURE — 25000003 PHARM REV CODE 250

## 2023-05-21 PROCEDURE — 83735 ASSAY OF MAGNESIUM: CPT

## 2023-05-21 PROCEDURE — 84100 ASSAY OF PHOSPHORUS: CPT

## 2023-05-21 PROCEDURE — 97165 OT EVAL LOW COMPLEX 30 MIN: CPT

## 2023-05-21 PROCEDURE — 97162 PT EVAL MOD COMPLEX 30 MIN: CPT

## 2023-05-21 PROCEDURE — 85025 COMPLETE CBC W/AUTO DIFF WBC: CPT | Performed by: STUDENT IN AN ORGANIZED HEALTH CARE EDUCATION/TRAINING PROGRAM

## 2023-05-21 PROCEDURE — 99239 HOSP IP/OBS DSCHRG MGMT >30: CPT | Mod: ,,, | Performed by: STUDENT IN AN ORGANIZED HEALTH CARE EDUCATION/TRAINING PROGRAM

## 2023-05-21 PROCEDURE — 25000003 PHARM REV CODE 250: Performed by: STUDENT IN AN ORGANIZED HEALTH CARE EDUCATION/TRAINING PROGRAM

## 2023-05-21 PROCEDURE — 99239 PR HOSPITAL DISCHARGE DAY,>30 MIN: ICD-10-PCS | Mod: ,,, | Performed by: STUDENT IN AN ORGANIZED HEALTH CARE EDUCATION/TRAINING PROGRAM

## 2023-05-21 RX ORDER — FOLIC ACID 1 MG/1
1 TABLET ORAL DAILY
Qty: 30 TABLET | Refills: 11 | Status: ON HOLD | OUTPATIENT
Start: 2023-05-21 | End: 2023-11-24

## 2023-05-21 RX ORDER — MAGNESIUM SULFATE HEPTAHYDRATE 40 MG/ML
2 INJECTION, SOLUTION INTRAVENOUS ONCE
Status: COMPLETED | OUTPATIENT
Start: 2023-05-21 | End: 2023-05-21

## 2023-05-21 RX ORDER — DULOXETIN HYDROCHLORIDE 30 MG/1
90 CAPSULE, DELAYED RELEASE ORAL DAILY
Qty: 90 CAPSULE | Refills: 11 | Status: ON HOLD | OUTPATIENT
Start: 2023-05-22 | End: 2023-06-22 | Stop reason: HOSPADM

## 2023-05-21 RX ORDER — METFORMIN HYDROCHLORIDE 500 MG/1
1000 TABLET, EXTENDED RELEASE ORAL 2 TIMES DAILY WITH MEALS
Qty: 360 TABLET | Refills: 3 | Status: ON HOLD | OUTPATIENT
Start: 2023-05-21 | End: 2023-11-29 | Stop reason: HOSPADM

## 2023-05-21 RX ORDER — NALTREXONE HYDROCHLORIDE 50 MG/1
50 TABLET, FILM COATED ORAL DAILY
Qty: 30 TABLET | Refills: 0 | Status: ON HOLD | OUTPATIENT
Start: 2023-05-22 | End: 2023-06-22 | Stop reason: HOSPADM

## 2023-05-21 RX ORDER — ONDANSETRON 4 MG/1
4 TABLET, ORALLY DISINTEGRATING ORAL EVERY 6 HOURS PRN
Qty: 30 TABLET | Refills: 0 | Status: ON HOLD | OUTPATIENT
Start: 2023-05-21 | End: 2023-06-22 | Stop reason: HOSPADM

## 2023-05-21 RX ADMIN — LISINOPRIL 20 MG: 20 TABLET ORAL at 08:05

## 2023-05-21 RX ADMIN — HEPARIN SODIUM 5000 UNITS: 5000 INJECTION INTRAVENOUS; SUBCUTANEOUS at 06:05

## 2023-05-21 RX ADMIN — DULOXETINE 90 MG: 30 CAPSULE, DELAYED RELEASE ORAL at 08:05

## 2023-05-21 RX ADMIN — THIAMINE HCL TAB 100 MG 100 MG: 100 TAB at 08:05

## 2023-05-21 RX ADMIN — ACETAMINOPHEN 650 MG: 325 TABLET ORAL at 07:05

## 2023-05-21 RX ADMIN — ONDANSETRON 4 MG: 2 INJECTION INTRAMUSCULAR; INTRAVENOUS at 07:05

## 2023-05-21 RX ADMIN — LEVOTHYROXINE SODIUM 50 MCG: 50 TABLET ORAL at 06:05

## 2023-05-21 RX ADMIN — THERA TABS 1 TABLET: TAB at 08:05

## 2023-05-21 RX ADMIN — MAGNESIUM SULFATE 2 G: 2 INJECTION INTRAVENOUS at 08:05

## 2023-05-21 RX ADMIN — FOLIC ACID 1 MG: 1 TABLET ORAL at 08:05

## 2023-05-21 RX ADMIN — NALTREXONE HYDROCHLORIDE 50 MG: 50 TABLET, FILM COATED ORAL at 08:05

## 2023-05-21 NOTE — PT/OT/SLP EVAL
Physical Therapy Co-Evaluation    and Discharge Note    Patient Name:  Earl Abdul   MRN:  [id number]    Recommendations:     Discharge Recommendations:  other (see comments) (home with 24/7 support)   Discharge Equipment Recommendations: walker, rolling   Barriers to discharge: None    Assessment:     Earl Abdul is a 62 y.o. female admitted with a medical diagnosis of Alcoholic ketoacidosis.  At this time, patient is functioning at their prior level of function and does not require further acute PT services.     Recent Surgery: * No surgery found *      Plan:     During this hospitalization, patient does not require further acute PT services.  Please re-consult if situation changes.      Subjective     Chief Complaint: decreased tolerance to functional mobility  Patient/Family Comments/goals: Return home  Pain/Comfort:  Pain Rating 1: 0/10    Patients cultural, spiritual, Shinto conflicts given the current situation: no    Living Environment:  Patient lives with their spouse in two story home, 4 steps with No hand rail, 12 steps up with Right  handrail and no landing, Bathroom on 2 floor, Bedroom on 2 floor, tub/shower or walk in shower.   Pt utilizes No AD for ambulation of all distances.    Prior to admission, patient was independent with ADLs.   DME owned: CPAP, shower chair.   DME not currently used: RW.   Upon discharge, patient will have assistance from family with no 24/7 assist.     Objective:     Communicated with nursing prior to session.  Patient found HOB elevated with peripheral IV  upon PT entry to room.    General Precautions: Standard, fall   Orthopedic Precautions:N/A   Braces: N/A   Respiratory Status:        Exams:  Cognitive Exam:  Patient is oriented to Person, Place, Time, and Situation  RLE ROM: WFL  RLE Strength: WFL  LLE ROM: WFL  LLE Strength: WFL  Postural Exam:  Patient presented with the following abnormalities:    -       Rounded shoulders  -       Forward  head  Sensation:    -       Intact    Functional Mobility:  Bed Mobility:  Rolling Right: SBA  Scooting: SBA  Supine to Sit: SBA  Sit to Supine: SBA  Head of bed position: HOB elevated    Transfers:  Sit to Stand: SBA with No AD    Gait: Patient ambulated 120' with No AD and SBA. Patient demonstrates occasional unsteady gait, decreased step length, and decreased johan. All lines remained intact throughout ambulation trial.    Balance: good    Therapeutic Activities:  Patient educated on role of acute care PT and PT POC, safety while in hospital including calling nurse for mobility, call light usage, benefits of out of bed mobility, breathing technique, fall risk, bed mobility , transfers, gait technique, positioning, posture, risks of prolonged bed rest, possible discharge disposition , and benefits of continued PT by explanation and demonstration.    Patient demonstrates fair understanding of education provided this day.   Whiteboard updated    Therapeutic Exercises:  n/a    AM-PAC 6 CLICK MOBILITY  Total Score:18     Patient left HOB elevated with all lines intact, call button in reach, and RN notified.    GOALS:   Multidisciplinary Problems       Physical Therapy Goals       Not on file              Multidisciplinary Problems (Resolved)          Problem: Physical Therapy    Goal Priority Disciplines Outcome Goal Variances Interventions   Physical Therapy Goal   (Resolved)     PT, PT/OT Met     Description: Pt tolerated PT session well.  Pt is functioning at their baseline and does not require skilled PT at this time.  Orders D/C.     All needs met, all questions answered.                         History:     Past Medical History:   Diagnosis Date    Anemia     Anemia     Controlled type 2 diabetes mellitus without complication, without long-term current use of insulin 11/30/2021    COPD (chronic obstructive pulmonary disease)     Depression     Diverticulitis     Fatty liver     GERD (gastroesophageal reflux  disease)     Hyperlipidemia     Hypertension     Pancreatitis     Peptic ulcer disease     Polysubstance abuse     Posterior reversible encephalopathy syndrome     Sarcoidosis of lung     Sarcoidosis of lung     over 30 yrs ago    Seizures     7/2017    Suicide attempt     Suicide ideation        Past Surgical History:   Procedure Laterality Date    APPENDECTOMY      BILATERAL MASTECTOMY Bilateral 10/29/2020    Procedure: MASTECTOMY, BILATERAL;  Surgeon: Baylee Kevin MD;  Location: [address];  Service: General;  Laterality: Bilateral;    BREAST REVISION SURGERY Bilateral 2/11/2021    Procedure: BREAST REVISION SURGERY;  Surgeon: Scottie Johnson MD;  Location: [address];  Service: Plastics;  Laterality: Bilateral;    COLONOSCOPY N/A 7/28/2017    Procedure: COLONOSCOPY;  Surgeon: Aaron Alvarado MD;  Location: [facility];  Service: Endoscopy;  Laterality: N/A;    ESOPHAGOGASTRODUODENOSCOPY  10/7/2016, 11/6/2014    2016 - gastritis, duodenitis, 2014 erosive gastritis    ESOPHAGOGASTRODUODENOSCOPY N/A 2/11/2020    Procedure: ESOPHAGOGASTRODUODENOSCOPY (EGD);  Surgeon: Fawn Garrido MD;  Location: [facility];  Service: Endoscopy;  Laterality: N/A;    ESOPHAGOGASTRODUODENOSCOPY N/A 4/19/2021    Procedure: EGD (ESOPHAGOGASTRODUODENOSCOPY);  Surgeon: Paramjit Martino MD;  Location: [facility];  Service: Endoscopy;  Laterality: N/A;    FLEXIBLE SIGMOIDOSCOPY  11/06/2014    colitis    HYSTERECTOMY      IMPLANTATION OF PERMANENT SACRAL NERVE STIMULATOR N/A 7/12/2022    Procedure: INSERTION, NEUROSTIMULATOR, PERMANENT, SACRAL;  Surgeon: Juaquin Edwards MD;  Location: [address];  Service: Urology;  Laterality: N/A;  1hr    INJECTION FOR SENTINEL NODE IDENTIFICATION Right 10/29/2020    Procedure: INJECTION, FOR SENTINEL NODE IDENTIFICATION;  Surgeon: Baylee Kevin MD;  Location: [address];  Service: General;  Laterality: Right;    INJECTION OF JOINT Right 10/10/2019    Procedure:  Injection, Joint RIGHT ILIOPSOAS BURSA/TENDON INJECTION AND RIGHT GLUTEAL TENDON INJECTION WITH STEROID AND LIDOCAINE;  Surgeon: Guillaume Rico MD;  Location: [facility];  Service: Pain Management;  Laterality: Right;  NEEDS CONSENT    INSERTION OF BREAST TISSUE EXPANDER Bilateral 10/29/2020    Procedure: INSERTION, TISSUE EXPANDER, BREAST;  Surgeon: Scottie Johnson MD;  Location: [facility] OR [address] [place] [address];  Service: Plastics;  Laterality: Bilateral;  Right breast: 1082 g  Left breast: 1076 g    LIPOSUCTION Bilateral 2/11/2021    Procedure: LIPOSUCTION;  Surgeon: Scottie Johnson MD;  Location: [facility] OR [address] [place] [address];  Service: Plastics;  Laterality: Bilateral;    mediastenoscopy      REPLACEMENT OF IMPLANT OF BREAST Bilateral 2/11/2021    Procedure: REPLACEMENT, IMPLANT, BREAST;  Surgeon: Scottie Johnson MD;  Location: [address];  Service: Plastics;  Laterality: Bilateral;    SENTINEL LYMPH NODE BIOPSY Right 10/29/2020    Procedure: BIOPSY, LYMPH NODE, SENTINEL;  Surgeon: Baylee Kevin MD;  Location: [facility] OR [address] [place] [address];  Service: General;  Laterality: Right;    TONSILLECTOMY N/A 1970    TUBAL LIGATION         Time Tracking:     PT Received On: 05/21/23  PT Start Time: 0903     PT Stop Time: 0912  PT Total Time (min): 9 min     Billable Minutes: Evaluation 9    05/21/2023    Co-treatment performed for this visit due to patient need for two skilled therapists to ensure patient and staff safety, to accommodate for patient activity tolerance/pain management, and maximize functional potential.

## 2023-05-21 NOTE — PLAN OF CARE
Problem: Adult Inpatient Plan of Care  Goal: Plan of Care Review  Outcome: Ongoing, Progressing  Goal: Patient-Specific Goal (Individualized)  Outcome: Ongoing, Progressing  Goal: Absence of Hospital-Acquired Illness or Injury  Outcome: Ongoing, Progressing  Goal: Optimal Comfort and Wellbeing  Outcome: Ongoing, Progressing  Goal: Readiness for Transition of Care  Outcome: Ongoing, Progressing     Problem: Fluid and Electrolyte Imbalance (Acute Kidney Injury/Impairment)  Goal: Fluid and Electrolyte Balance  Outcome: Ongoing, Progressing     Problem: Oral Intake Inadequate (Acute Kidney Injury/Impairment)  Goal: Optimal Nutrition Intake  Outcome: Ongoing, Progressing     Problem: Fall Injury Risk  Goal: Absence of Fall and Fall-Related Injury  Outcome: Ongoing, Progressing

## 2023-05-21 NOTE — NURSING
AVS reviewed with patient, voiced understanding, patient assisted off unit via wheelchair by family.

## 2023-05-21 NOTE — PLAN OF CARE
Problem: Physical Therapy  Goal: Physical Therapy Goal  Description: Pt tolerated PT session well.  Pt is functioning at their baseline and does not require skilled PT at this time.  Orders D/C.     All needs met, all questions answered.    Outcome: Met

## 2023-05-21 NOTE — PT/OT/SLP EVAL
Occupational Therapy   Evaluation and Discharge Note    Name: Earl Abdul  MRN: [id number]  Admitting Diagnosis: Alcoholic ketoacidosis  Recent Surgery: * No surgery found *      Recommendations:     Discharge Recommendations: other (see comments) (home w/ 24/7 supervision)  Discharge Equipment Recommendations: walker, rolling  Barriers to discharge:  None    Assessment:     Earl Abdul is a 62 y.o. female with a medical diagnosis of Alcoholic ketoacidosis. At this time, patient is functioning at their prior level of function and does not require further acute OT services.    Plan:     During this hospitalization, patient does not require further acute OT services.  Please re-consult if situation changes.    Plan of Care Reviewed with: patient    Subjective     Chief Complaint: decreased tolerance to functional mobility, involuntary BUE tremors  Patient/Family Comments/goals: Get better, return home     Occupational Profile:  Living Environment: Pt lives with their spouse in 2SH with 4ste/0HR, 12STE to 2nd floor with RHR and no landing. Bd/bath on 2nd floor. Tub and WIS separate  Previous level of function: Independent in ADLs/mobility   Roles and Routines: spouse  Equipment Used at home: CPAP, shower chair  Assistance upon Discharge: family with 24/7 supervision     Pain/Comfort:  Pain Rating 1: 0/10  Pain Rating Post-Intervention 1: 0/10    Patients cultural, spiritual, Lutheran conflicts given the current situation: no    Objective:     Communicated with: RN prior to session.  Patient found supine with peripheral IV upon OT entry to room.    General Precautions: Standard, fall  Orthopedic Precautions: N/A  Braces: N/A  Respiratory Status: Room air     Occupational Performance:    Bed Mobility:    Patient completed Rolling/Turning to Right with stand by assistance  Patient completed Scooting/Bridging with stand by assistance  Patient completed Supine to Sit with stand by assistance  Patient completed  Sit to Supine with stand by assistance    Functional Mobility/Transfers:  Patient completed Sit <> Stand Transfer with stand by assistance  with  no assistive device   Functional Mobility: Pt engaging in functional mobility to simulate household/community distances with SBA and utilizing no AD in order to maximize functional activity tolerance and standing balance required for engagement in occupations of choice.     Activities of Daily Living:  Upper Body Dressing: minimum assistance affixing gown at neck and back for maximal coverage  Lower Body Dressing: supervision donning bilateral slippers     Cognitive/Visual Perceptual:  Cognitive/Psychosocial Skills:     -       Oriented to: AOX4   -       Follows Commands/attention:Follows multistep  commands  -       Communication: clear/fluent  -       Memory: No Deficits noted  -       Safety awareness/insight to disability: impaired   -       Mood/Affect/Coping skills/emotional control: Appropriate to situation  Visual/Perceptual:      -Intact      Physical Exam:  Balance:    -       sitting: good; standing: good; walking: fair   Postural examination/scapula alignment:    -       Rounded shoulders  -       Forward head  Motor Planning:    -       involuntary BUE tremors   Dominant hand:    -       right   Upper Extremity Range of Motion:     -       Right Upper Extremity: WFL  -       Left Upper Extremity: WFL  Upper Extremity Strength:    -       Right Upper Extremity: WFL  -       Left Upper Extremity: WFL   Strength:    -       Right Upper Extremity: WFL  -       Left Upper Extremity: WFL  Fine Motor Coordination:    -       Intact  Gross motor coordination:   WFL  Neurological:    -       Intact     AMPAC 6 Click ADL:  AMPAC Total Score: 24    Treatment & Education:  Pt educated on role of OT, POC, and goals for therapy.    POC was dicussed with patient/caregiver, who was included in its development and is in agreement with the identified goals and treatment  plan.   Patient and family aware of patient's deficits and therapy progression.   Time provided for therapeutic counseling and discussion of health disposition.   Educated on importance of EOB/OOB mobility, maintaining routine, sitting up in chair, and maximizing independence with ADLs during admission   Pt completed ADLs and functional mobility for treatment session as noted above   Pt/caregiver verbalized understanding and expressed no further concerns/questions.      Patient left supine with all lines intact, call button in reach, and RN notified    GOALS:   Multidisciplinary Problems       Occupational Therapy Goals       Not on file                    History:     Past Medical History:   Diagnosis Date    Anemia     Anemia     Controlled type 2 diabetes mellitus without complication, without long-term current use of insulin 11/30/2021    COPD (chronic obstructive pulmonary disease)     Depression     Diverticulitis     Fatty liver     GERD (gastroesophageal reflux disease)     Hyperlipidemia     Hypertension     Pancreatitis     Peptic ulcer disease     Polysubstance abuse     Posterior reversible encephalopathy syndrome     Sarcoidosis of lung     Sarcoidosis of lung     over 30 yrs ago    Seizures     7/2017    Suicide attempt     Suicide ideation          Past Surgical History:   Procedure Laterality Date    APPENDECTOMY      BILATERAL MASTECTOMY Bilateral 10/29/2020    Procedure: MASTECTOMY, BILATERAL;  Surgeon: Baylee Kevin MD;  Location: [address];  Service: General;  Laterality: Bilateral;    BREAST REVISION SURGERY Bilateral 2/11/2021    Procedure: BREAST REVISION SURGERY;  Surgeon: Scottie Johnson MD;  Location: [address];  Service: Plastics;  Laterality: Bilateral;    COLONOSCOPY N/A 7/28/2017    Procedure: COLONOSCOPY;  Surgeon: Aaron Alvarado MD;  Location: [facility];  Service: Endoscopy;  Laterality: N/A;    ESOPHAGOGASTRODUODENOSCOPY  10/7/2016, 11/6/2014    2016 - gastritis,  duodenitis, 2014 erosive gastritis    ESOPHAGOGASTRODUODENOSCOPY N/A 2/11/2020    Procedure: ESOPHAGOGASTRODUODENOSCOPY (EGD);  Surgeon: Fawn Garrido MD;  Location: [facility];  Service: Endoscopy;  Laterality: N/A;    ESOPHAGOGASTRODUODENOSCOPY N/A 4/19/2021    Procedure: EGD (ESOPHAGOGASTRODUODENOSCOPY);  Surgeon: Paramjit Martino MD;  Location: [facility];  Service: Endoscopy;  Laterality: N/A;    FLEXIBLE SIGMOIDOSCOPY  11/06/2014    colitis    HYSTERECTOMY      IMPLANTATION OF PERMANENT SACRAL NERVE STIMULATOR N/A 7/12/2022    Procedure: INSERTION, NEUROSTIMULATOR, PERMANENT, SACRAL;  Surgeon: Juaquin Edwards MD;  Location: [facility] OR [address] [place] [address];  Service: Urology;  Laterality: N/A;  1hr    INJECTION FOR SENTINEL NODE IDENTIFICATION Right 10/29/2020    Procedure: INJECTION, FOR SENTINEL NODE IDENTIFICATION;  Surgeon: Baylee Kevin MD;  Location: [facility] OR [address] [place] [address];  Service: General;  Laterality: Right;    INJECTION OF JOINT Right 10/10/2019    Procedure: Injection, Joint RIGHT ILIOPSOAS BURSA/TENDON INJECTION AND RIGHT GLUTEAL TENDON INJECTION WITH STEROID AND LIDOCAINE;  Surgeon: Guillaume Rico MD;  Location: [facility];  Service: Pain Management;  Laterality: Right;  NEEDS CONSENT    INSERTION OF BREAST TISSUE EXPANDER Bilateral 10/29/2020    Procedure: INSERTION, TISSUE EXPANDER, BREAST;  Surgeon: Scottie Johnson MD;  Location: [address];  Service: Plastics;  Laterality: Bilateral;  Right breast: 1082 g  Left breast: 1076 g    LIPOSUCTION Bilateral 2/11/2021    Procedure: LIPOSUCTION;  Surgeon: Scottie Johnson MD;  [facility];  Service: Plastics;  Laterality: Bilateral;    mediastenoscopy      REPLACEMENT OF IMPLANT OF BREAST Bilateral 2/11/2021    Procedure: REPLACEMENT, IMPLANT, BREAST;  Surgeon: Scottie Johnson MD;  Location: [address];  Service: Plastics;  Laterality: Bilateral;    SENTINEL LYMPH NODE BIOPSY Right 10/29/2020    Procedure: BIOPSY,  LYMPH NODE, SENTINEL;  Surgeon: Baylee Kevin MD;  Location: [facility] OR [address] [place] [address];  Service: General;  Laterality: Right;    TONSILLECTOMY N/A 1970    TUBAL LIGATION         Time Tracking:     OT Date of Treatment: 05/21/23  OT Start Time: 0903  OT Stop Time: 0912  OT Total Time (min): 9 min    Billable Minutes:Evaluation 9    5/21/2023

## 2023-05-22 ENCOUNTER — PATIENT OUTREACH (OUTPATIENT)
Dept: ADMINISTRATIVE | Facility: CLINIC | Age: 63
End: 2023-05-22
Payer: COMMERCIAL

## 2023-05-22 NOTE — PROGRESS NOTES
C3 nurse attempted to contact Earl Abdul  for a TCC post hospital discharge follow up call. The patient is unable to conduct the call @ this time. The patient requested a callback.    The patient does not have a scheduled HOSFU appointment within 5-7 days post hospital discharge date 05/21/23. Message sent to Physician staff to assist with HOSFU appointment scheduling.

## 2023-05-23 NOTE — PROGRESS NOTES
3rd Attempt made to reach patient for TCC call. Left voicemail please call [phone] leave first name, last name, and  Ev will return your call.

## 2023-05-23 NOTE — PROGRESS NOTES
2nd Attempt made to reach patient for TCC call. Left voicemail please call [phone] leave first name, last name, and  Ev will return your call.

## 2023-05-25 ENCOUNTER — PATIENT MESSAGE (OUTPATIENT)
Dept: HEMATOLOGY/ONCOLOGY | Facility: CLINIC | Age: 63
End: 2023-05-25
Payer: COMMERCIAL

## 2023-05-25 ENCOUNTER — PES CALL (OUTPATIENT)
Dept: ADMINISTRATIVE | Facility: CLINIC | Age: 63
End: 2023-05-25
Payer: COMMERCIAL

## 2023-05-25 ENCOUNTER — PATIENT MESSAGE (OUTPATIENT)
Dept: INTERNAL MEDICINE | Facility: CLINIC | Age: 63
End: 2023-05-25
Payer: COMMERCIAL

## 2023-05-25 DIAGNOSIS — F41.8 DEPRESSION WITH ANXIETY: Primary | ICD-10-CM

## 2023-05-29 ENCOUNTER — LAB VISIT (OUTPATIENT)
Dept: LAB | Facility: HOSPITAL | Age: 63
End: 2023-05-29
Payer: COMMERCIAL

## 2023-05-29 DIAGNOSIS — C91.10 CLL (CHRONIC LYMPHOCYTIC LEUKEMIA): ICD-10-CM

## 2023-05-29 DIAGNOSIS — D72.820 MONOCLONAL B-CELL LYMPHOCYTOSIS: ICD-10-CM

## 2023-05-29 LAB
ALBUMIN SERPL BCP-MCNC: 4.1 G/DL (ref 3.5–5.2)
ALP SERPL-CCNC: 48 U/L (ref 55–135)
ALT SERPL W/O P-5'-P-CCNC: 62 U/L (ref 10–44)
ANION GAP SERPL CALC-SCNC: 12 MMOL/L (ref 8–16)
ANISOCYTOSIS BLD QL SMEAR: SLIGHT
AST SERPL-CCNC: 48 U/L (ref 10–40)
BASOPHILS # BLD AUTO: 0.06 K/UL (ref 0–0.2)
BASOPHILS NFR BLD: 0.6 % (ref 0–1.9)
BILIRUB SERPL-MCNC: 0.5 MG/DL (ref 0.1–1)
BUN SERPL-MCNC: 10 MG/DL (ref 8–23)
CALCIUM SERPL-MCNC: 10.1 MG/DL (ref 8.7–10.5)
CHLORIDE SERPL-SCNC: 100 MMOL/L (ref 95–110)
CO2 SERPL-SCNC: 24 MMOL/L (ref 23–29)
CREAT SERPL-MCNC: 0.8 MG/DL (ref 0.5–1.4)
DIFFERENTIAL METHOD: ABNORMAL
EOSINOPHIL # BLD AUTO: 0.1 K/UL (ref 0–0.5)
EOSINOPHIL NFR BLD: 1 % (ref 0–8)
ERYTHROCYTE [DISTWIDTH] IN BLOOD BY AUTOMATED COUNT: 17.7 % (ref 11.5–14.5)
EST. GFR  (NO RACE VARIABLE): >60 ML/MIN/1.73 M^2
GLUCOSE SERPL-MCNC: 83 MG/DL (ref 70–110)
HCT VFR BLD AUTO: 38.5 % (ref 37–48.5)
HGB BLD-MCNC: 11.6 G/DL (ref 12–16)
IMM GRANULOCYTES # BLD AUTO: 0.02 K/UL (ref 0–0.04)
IMM GRANULOCYTES NFR BLD AUTO: 0.2 % (ref 0–0.5)
LDH SERPL L TO P-CCNC: 159 U/L (ref 110–260)
LYMPHOCYTES # BLD AUTO: 5.6 K/UL (ref 1–4.8)
LYMPHOCYTES NFR BLD: 55.8 % (ref 18–48)
MCH RBC QN AUTO: 27.3 PG (ref 27–31)
MCHC RBC AUTO-ENTMCNC: 30.1 G/DL (ref 32–36)
MCV RBC AUTO: 91 FL (ref 82–98)
MONOCYTES # BLD AUTO: 0.7 K/UL (ref 0.3–1)
MONOCYTES NFR BLD: 7 % (ref 4–15)
NEUTROPHILS # BLD AUTO: 3.6 K/UL (ref 1.8–7.7)
NEUTROPHILS NFR BLD: 35.4 % (ref 38–73)
NRBC BLD-RTO: 0 /100 WBC
PLATELET # BLD AUTO: 158 K/UL (ref 150–450)
PLATELET BLD QL SMEAR: ABNORMAL
PMV BLD AUTO: 10.7 FL (ref 9.2–12.9)
POLYCHROMASIA BLD QL SMEAR: ABNORMAL
POTASSIUM SERPL-SCNC: 5 MMOL/L (ref 3.5–5.1)
PROT SERPL-MCNC: 7 G/DL (ref 6–8.4)
RBC # BLD AUTO: 4.25 M/UL (ref 4–5.4)
SODIUM SERPL-SCNC: 136 MMOL/L (ref 136–145)
WBC # BLD AUTO: 10.06 K/UL (ref 3.9–12.7)

## 2023-05-29 PROCEDURE — 81263 IGH VARI REGIONAL MUTATION: CPT | Performed by: INTERNAL MEDICINE

## 2023-05-29 PROCEDURE — 80053 COMPREHEN METABOLIC PANEL: CPT | Performed by: PHYSICIAN ASSISTANT

## 2023-05-29 PROCEDURE — 88185 FLOWCYTOMETRY/TC ADD-ON: CPT | Mod: 59 | Performed by: INTERNAL MEDICINE

## 2023-05-29 PROCEDURE — 88184 FLOWCYTOMETRY/ TC 1 MARKER: CPT | Performed by: INTERNAL MEDICINE

## 2023-05-29 PROCEDURE — 83615 LACTATE (LD) (LDH) ENZYME: CPT | Performed by: INTERNAL MEDICINE

## 2023-05-29 PROCEDURE — 85025 COMPLETE CBC W/AUTO DIFF WBC: CPT | Performed by: PHYSICIAN ASSISTANT

## 2023-05-29 PROCEDURE — 81352 TP53 GENE TRGT SEQUENCE ALYS: CPT | Performed by: INTERNAL MEDICINE

## 2023-06-02 DIAGNOSIS — F41.8 DEPRESSION WITH ANXIETY: ICD-10-CM

## 2023-06-02 DIAGNOSIS — F41.8 DEPRESSION WITH ANXIETY: Primary | ICD-10-CM

## 2023-06-02 DIAGNOSIS — F19.11 HISTORY OF SUBSTANCE ABUSE: Primary | Chronic | ICD-10-CM

## 2023-06-02 RX ORDER — TRAZODONE HYDROCHLORIDE 300 MG/1
TABLET ORAL
Qty: 30 TABLET | Refills: 2 | Status: SHIPPED | OUTPATIENT
Start: 2023-06-02 | End: 2023-06-27

## 2023-06-02 NOTE — TELEPHONE ENCOUNTER
Refill Encounter    PCP Visits: Recent Visits  Date Type Provider Dept   04/06/23 Office Visit Andrew Rodriguez MD [facility] Internal Medicine   10/13/22 Office Visit Andrew Rodriguez MD [facility] Internal Medicine   09/06/22 Office Visit Andrew Rodriguez MD [facility] Internal Medicine   Showing recent visits within past 360 days and meeting all other requirements  Future Appointments  No visits were found meeting these conditions.  Showing future appointments within next 720 days and meeting all other requirements     Last 3 Blood Pressure:   BP Readings from Last 3 Encounters:   05/21/23 (!) 106/58   04/30/23 (!) 158/80   04/06/23 (!) 110/56     Preferred Pharmacy:   [id number] - [place] - [address] [address] [address]  Phone: [phone] Fax: [phone]    Requested RX:  Requested Prescriptions     Pending Prescriptions Disp Refills    traZODone (DESYREL) 300 MG tablet [Pharmacy Med Name: TRAZODONE 300MG TABLETS] 30 tablet 2     Sig: TAKE 1 TABLET BY MOUTH AT BEDTIME      RX Route: Normal

## 2023-06-02 NOTE — TELEPHONE ENCOUNTER
Refill Encounter    PCP Visits: Recent Visits  Date Type Provider Dept   04/06/23 Office Visit Andrew Rodriguez MD [facility] Internal Medicine   10/13/22 Office Visit Andrew Rodriguez MD [facility] Internal Medicine   09/06/22 Office Visit Andrew Rodriguez MD [facility] Internal Medicine   Showing recent visits within past 360 days and meeting all other requirements  Future Appointments  No visits were found meeting these conditions.  Showing future appointments within next 720 days and meeting all other requirements     Last 3 Blood Pressure:   BP Readings from Last 3 Encounters:   05/21/23 (!) 106/58   04/30/23 (!) 158/80   04/06/23 (!) 110/56     Preferred Pharmacy:   [id number] - [place] - [address] [facility] [address] [address]  Phone: [phone] Fax: [phone]    Requested RX:  Requested Prescriptions     Pending Prescriptions Disp Refills    traZODone (DESYREL) 300 MG tablet [Pharmacy Med Name: TRAZODONE 300MG TABLETS] 30 tablet 2     Sig: TAKE 1 TABLET BY MOUTH AT BEDTIME      RX Route: Normal

## 2023-06-05 LAB
PATH REPORT.FINAL DX SPEC: NORMAL
SIGNING PATHOLOGIST: NORMAL

## 2023-06-06 ENCOUNTER — TELEPHONE (OUTPATIENT)
Dept: INTERNAL MEDICINE | Facility: CLINIC | Age: 63
End: 2023-06-06
Payer: COMMERCIAL

## 2023-06-06 ENCOUNTER — OFFICE VISIT (OUTPATIENT)
Dept: INTERNAL MEDICINE | Facility: CLINIC | Age: 63
End: 2023-06-06
Attending: INTERNAL MEDICINE
Payer: COMMERCIAL

## 2023-06-06 ENCOUNTER — LAB VISIT (OUTPATIENT)
Dept: LAB | Facility: OTHER | Age: 63
End: 2023-06-06
Attending: INTERNAL MEDICINE
Payer: COMMERCIAL

## 2023-06-06 VITALS
DIASTOLIC BLOOD PRESSURE: 88 MMHG | BODY MASS INDEX: 26.22 KG/M2 | HEIGHT: 66 IN | SYSTOLIC BLOOD PRESSURE: 134 MMHG | OXYGEN SATURATION: 94 % | WEIGHT: 163.13 LBS | HEART RATE: 102 BPM

## 2023-06-06 DIAGNOSIS — R53.1 WEAKNESS: Primary | ICD-10-CM

## 2023-06-06 DIAGNOSIS — R53.1 WEAKNESS: ICD-10-CM

## 2023-06-06 DIAGNOSIS — F10.20 ALCOHOL USE DISORDER, SEVERE, DEPENDENCE: Chronic | ICD-10-CM

## 2023-06-06 DIAGNOSIS — F41.8 DEPRESSION WITH ANXIETY: ICD-10-CM

## 2023-06-06 DIAGNOSIS — R11.2 NAUSEA AND VOMITING, UNSPECIFIED VOMITING TYPE: ICD-10-CM

## 2023-06-06 LAB
ALBUMIN SERPL BCP-MCNC: 4.3 G/DL (ref 3.5–5.2)
ALP SERPL-CCNC: 50 U/L (ref 55–135)
ALT SERPL W/O P-5'-P-CCNC: 27 U/L (ref 10–44)
ANION GAP SERPL CALC-SCNC: 13 MMOL/L (ref 8–16)
ANISOCYTOSIS BLD QL SMEAR: SLIGHT
AST SERPL-CCNC: 23 U/L (ref 10–40)
BASOPHILS # BLD AUTO: 0.04 K/UL (ref 0–0.2)
BASOPHILS NFR BLD: 0.4 % (ref 0–1.9)
BCLL FINAL DIAGNOSIS: NORMAL
BCLL RESULT: NORMAL
BCLL SPECIMEN TYPE: NORMAL
BILIRUB SERPL-MCNC: 0.6 MG/DL (ref 0.1–1)
BUN SERPL-MCNC: 5 MG/DL (ref 8–23)
CALCIUM SERPL-MCNC: 10 MG/DL (ref 8.7–10.5)
CHLORIDE SERPL-SCNC: 101 MMOL/L (ref 95–110)
CO2 SERPL-SCNC: 23 MMOL/L (ref 23–29)
CREAT SERPL-MCNC: 0.8 MG/DL (ref 0.5–1.4)
DIFFERENTIAL METHOD: ABNORMAL
EOSINOPHIL # BLD AUTO: 0.1 K/UL (ref 0–0.5)
EOSINOPHIL NFR BLD: 1.1 % (ref 0–8)
ERYTHROCYTE [DISTWIDTH] IN BLOOD BY AUTOMATED COUNT: 16.5 % (ref 11.5–14.5)
EST. GFR  (NO RACE VARIABLE): >60 ML/MIN/1.73 M^2
GLUCOSE SERPL-MCNC: 87 MG/DL (ref 70–110)
HCT VFR BLD AUTO: 38.5 % (ref 37–48.5)
HGB BLD-MCNC: 12.1 G/DL (ref 12–16)
IMM GRANULOCYTES # BLD AUTO: 0.03 K/UL (ref 0–0.04)
IMM GRANULOCYTES NFR BLD AUTO: 0.3 % (ref 0–0.5)
LYMPHOCYTES # BLD AUTO: 5.1 K/UL (ref 1–4.8)
LYMPHOCYTES NFR BLD: 51.7 % (ref 18–48)
MCH RBC QN AUTO: 28.1 PG (ref 27–31)
MCHC RBC AUTO-ENTMCNC: 31.4 G/DL (ref 32–36)
MCV RBC AUTO: 90 FL (ref 82–98)
MONOCYTES # BLD AUTO: 0.6 K/UL (ref 0.3–1)
MONOCYTES NFR BLD: 5.7 % (ref 4–15)
NEUTROPHILS # BLD AUTO: 4 K/UL (ref 1.8–7.7)
NEUTROPHILS NFR BLD: 40.8 % (ref 38–73)
NRBC BLD-RTO: 0 /100 WBC
PLATELET # BLD AUTO: 151 K/UL (ref 150–450)
PLATELET BLD QL SMEAR: ABNORMAL
PMV BLD AUTO: 11.2 FL (ref 9.2–12.9)
POTASSIUM SERPL-SCNC: 4.5 MMOL/L (ref 3.5–5.1)
PROT SERPL-MCNC: 7.3 G/DL (ref 6–8.4)
RBC # BLD AUTO: 4.3 M/UL (ref 4–5.4)
SODIUM SERPL-SCNC: 137 MMOL/L (ref 136–145)
TSH SERPL DL<=0.005 MIU/L-ACNC: 1.17 UIU/ML (ref 0.4–4)
VIT B12 SERPL-MCNC: 368 PG/ML (ref 210–950)
WBC # BLD AUTO: 9.77 K/UL (ref 3.9–12.7)

## 2023-06-06 PROCEDURE — 85025 COMPLETE CBC W/AUTO DIFF WBC: CPT | Performed by: INTERNAL MEDICINE

## 2023-06-06 PROCEDURE — 99999 PR PBB SHADOW E&M-EST. PATIENT-LVL V: CPT | Mod: PBBFAC,,, | Performed by: INTERNAL MEDICINE

## 2023-06-06 PROCEDURE — 3072F PR LOW RISK FOR RETINOPATHY: ICD-10-PCS | Mod: CPTII,S$GLB,, | Performed by: INTERNAL MEDICINE

## 2023-06-06 PROCEDURE — 1111F DSCHRG MED/CURRENT MED MERGE: CPT | Mod: CPTII,S$GLB,, | Performed by: INTERNAL MEDICINE

## 2023-06-06 PROCEDURE — 3288F FALL RISK ASSESSMENT DOCD: CPT | Mod: CPTII,S$GLB,, | Performed by: INTERNAL MEDICINE

## 2023-06-06 PROCEDURE — 1160F RVW MEDS BY RX/DR IN RCRD: CPT | Mod: CPTII,S$GLB,, | Performed by: INTERNAL MEDICINE

## 2023-06-06 PROCEDURE — 99999 PR PBB SHADOW E&M-EST. PATIENT-LVL V: ICD-10-PCS | Mod: PBBFAC,,, | Performed by: INTERNAL MEDICINE

## 2023-06-06 PROCEDURE — 3079F DIAST BP 80-89 MM HG: CPT | Mod: CPTII,S$GLB,, | Performed by: INTERNAL MEDICINE

## 2023-06-06 PROCEDURE — 3079F PR MOST RECENT DIASTOLIC BLOOD PRESSURE 80-89 MM HG: ICD-10-PCS | Mod: CPTII,S$GLB,, | Performed by: INTERNAL MEDICINE

## 2023-06-06 PROCEDURE — 3044F HG A1C LEVEL LT 7.0%: CPT | Mod: CPTII,S$GLB,, | Performed by: INTERNAL MEDICINE

## 2023-06-06 PROCEDURE — 87040 BLOOD CULTURE FOR BACTERIA: CPT | Performed by: INTERNAL MEDICINE

## 2023-06-06 PROCEDURE — 1159F PR MEDICATION LIST DOCUMENTED IN MEDICAL RECORD: ICD-10-PCS | Mod: CPTII,S$GLB,, | Performed by: INTERNAL MEDICINE

## 2023-06-06 PROCEDURE — 84443 ASSAY THYROID STIM HORMONE: CPT | Performed by: INTERNAL MEDICINE

## 2023-06-06 PROCEDURE — 99214 PR OFFICE/OUTPT VISIT, EST, LEVL IV, 30-39 MIN: ICD-10-PCS | Mod: S$GLB,,, | Performed by: INTERNAL MEDICINE

## 2023-06-06 PROCEDURE — 1101F PR PT FALLS ASSESS DOC 0-1 FALLS W/OUT INJ PAST YR: ICD-10-PCS | Mod: CPTII,S$GLB,, | Performed by: INTERNAL MEDICINE

## 2023-06-06 PROCEDURE — 3072F LOW RISK FOR RETINOPATHY: CPT | Mod: CPTII,S$GLB,, | Performed by: INTERNAL MEDICINE

## 2023-06-06 PROCEDURE — 3044F PR MOST RECENT HEMOGLOBIN A1C LEVEL <7.0%: ICD-10-PCS | Mod: CPTII,S$GLB,, | Performed by: INTERNAL MEDICINE

## 2023-06-06 PROCEDURE — 3288F PR FALLS RISK ASSESSMENT DOCUMENTED: ICD-10-PCS | Mod: CPTII,S$GLB,, | Performed by: INTERNAL MEDICINE

## 2023-06-06 PROCEDURE — 1160F PR REVIEW ALL MEDS BY PRESCRIBER/CLIN PHARMACIST DOCUMENTED: ICD-10-PCS | Mod: CPTII,S$GLB,, | Performed by: INTERNAL MEDICINE

## 2023-06-06 PROCEDURE — 82607 VITAMIN B-12: CPT | Performed by: INTERNAL MEDICINE

## 2023-06-06 PROCEDURE — 3008F BODY MASS INDEX DOCD: CPT | Mod: CPTII,S$GLB,, | Performed by: INTERNAL MEDICINE

## 2023-06-06 PROCEDURE — 3075F SYST BP GE 130 - 139MM HG: CPT | Mod: CPTII,S$GLB,, | Performed by: INTERNAL MEDICINE

## 2023-06-06 PROCEDURE — 99214 OFFICE O/P EST MOD 30 MIN: CPT | Mod: S$GLB,,, | Performed by: INTERNAL MEDICINE

## 2023-06-06 PROCEDURE — 3075F PR MOST RECENT SYSTOLIC BLOOD PRESS GE 130-139MM HG: ICD-10-PCS | Mod: CPTII,S$GLB,, | Performed by: INTERNAL MEDICINE

## 2023-06-06 PROCEDURE — 84425 ASSAY OF VITAMIN B-1: CPT | Performed by: INTERNAL MEDICINE

## 2023-06-06 PROCEDURE — 80053 COMPREHEN METABOLIC PANEL: CPT | Performed by: INTERNAL MEDICINE

## 2023-06-06 PROCEDURE — 1101F PT FALLS ASSESS-DOCD LE1/YR: CPT | Mod: CPTII,S$GLB,, | Performed by: INTERNAL MEDICINE

## 2023-06-06 PROCEDURE — 4010F ACE/ARB THERAPY RXD/TAKEN: CPT | Mod: CPTII,S$GLB,, | Performed by: INTERNAL MEDICINE

## 2023-06-06 PROCEDURE — 1159F MED LIST DOCD IN RCRD: CPT | Mod: CPTII,S$GLB,, | Performed by: INTERNAL MEDICINE

## 2023-06-06 PROCEDURE — 3008F PR BODY MASS INDEX (BMI) DOCUMENTED: ICD-10-PCS | Mod: CPTII,S$GLB,, | Performed by: INTERNAL MEDICINE

## 2023-06-06 PROCEDURE — 1111F PR DISCHARGE MEDS RECONCILED W/ CURRENT OUTPATIENT MED LIST: ICD-10-PCS | Mod: CPTII,S$GLB,, | Performed by: INTERNAL MEDICINE

## 2023-06-06 PROCEDURE — 4010F PR ACE/ARB THEARPY RXD/TAKEN: ICD-10-PCS | Mod: CPTII,S$GLB,, | Performed by: INTERNAL MEDICINE

## 2023-06-06 RX ORDER — DULOXETIN HYDROCHLORIDE 60 MG/1
60 CAPSULE, DELAYED RELEASE ORAL
Status: ON HOLD | COMMUNITY
End: 2023-11-24

## 2023-06-06 RX ORDER — PANTOPRAZOLE SODIUM 40 MG/1
40 TABLET, DELAYED RELEASE ORAL DAILY
Status: ON HOLD | COMMUNITY
End: 2023-11-24

## 2023-06-06 RX ORDER — CHLORDIAZEPOXIDE HYDROCHLORIDE 25 MG/1
25 CAPSULE, GELATIN COATED ORAL 2 TIMES DAILY PRN
Qty: 10 CAPSULE | Refills: 0 | Status: ON HOLD | OUTPATIENT
Start: 2023-06-06 | End: 2023-06-22 | Stop reason: HOSPADM

## 2023-06-06 RX ORDER — DICYCLOMINE HYDROCHLORIDE 20 MG/1
20 TABLET ORAL 4 TIMES DAILY
Status: ON HOLD | COMMUNITY
End: 2023-11-29 | Stop reason: HOSPADM

## 2023-06-06 RX ORDER — GABAPENTIN 600 MG/1
600 TABLET ORAL 3 TIMES DAILY
Status: ON HOLD | COMMUNITY
End: 2023-06-22 | Stop reason: HOSPADM

## 2023-06-06 RX ORDER — ALBUTEROL SULFATE 90 UG/1
AEROSOL, METERED RESPIRATORY (INHALATION)
Status: ON HOLD | COMMUNITY
Start: 2022-07-05 | End: 2023-11-24

## 2023-06-06 NOTE — PROGRESS NOTES
"Subjective:       Patient ID: Earl Abdul is a 63 y.o. female.    Chief Complaint: Hospital Follow Up    Here for urgent visit      Has not been well since being discharged. She has decreased appetite, N/V, lightheaded, tremulous. States she has not had anything to drink since 3 days prior to admit 3 weeks prior. Requesting refill of trazodone and anxiolytic. Several recent admissions in past several months for alcohol withdrawal. No recent dose changes. She reports taking cymbalta 60mg QD despite MAR. She will double check when she gets home    Review of Systems   Constitutional:  Positive for activity change, appetite change, chills, diaphoresis and fatigue. Negative for fever and unexpected weight change.   HENT:  Negative for ear pain, hearing loss, postnasal drip, tinnitus, trouble swallowing and voice change.    Respiratory:  Negative for apnea, cough, choking, chest tightness, shortness of breath, wheezing and stridor.    Cardiovascular:  Negative for chest pain, palpitations and leg swelling.   Gastrointestinal:  Negative for abdominal pain, blood in stool, constipation, diarrhea, nausea, rectal pain and vomiting.   Endocrine: Negative for polydipsia, polyphagia and polyuria.   Genitourinary:  Negative for decreased urine volume, difficulty urinating, dysuria, flank pain, frequency, hematuria, pelvic pain, urgency and vaginal bleeding.   Skin:  Negative for rash.   Allergic/Immunologic: Negative for food allergies.   Neurological:  Positive for tremors, light-headedness and headaches. Negative for numbness.   Hematological:  Does not bruise/bleed easily.   Psychiatric/Behavioral:  Positive for sleep disturbance. The patient is not nervous/anxious.      Objective:      Vitals:    06/06/23 1117   BP: 134/88   Pulse: 102   SpO2: (!) 94%   Weight: 74 kg (163 lb 2.3 oz)   Height: 5' 6" (1.676 m)      Physical Exam  Vitals and nursing note reviewed.   Constitutional:       General: She is not in acute " distress.     Appearance: Normal appearance. She is well-developed.   HENT:      Head: Normocephalic and atraumatic.      Mouth/Throat:      Pharynx: No oropharyngeal exudate.   Eyes:      General: No scleral icterus.     Conjunctiva/sclera: Conjunctivae normal.      Pupils: Pupils are equal, round, and reactive to light.   Neck:      Thyroid: No thyromegaly.   Cardiovascular:      Rate and Rhythm: Regular rhythm. Tachycardia present.      Heart sounds: Normal heart sounds. No murmur heard.  Pulmonary:      Effort: Pulmonary effort is normal.      Breath sounds: Normal breath sounds. No wheezing or rales.   Abdominal:      General: There is no distension.   Musculoskeletal:         General: No tenderness.   Lymphadenopathy:      Cervical: No cervical adenopathy.   Skin:     General: Skin is warm and dry.   Neurological:      Mental Status: She is alert and oriented to person, place, and time.      Cranial Nerves: Cranial nerves 2-12 are intact.      Motor: Motor function is intact.   Psychiatric:         Behavior: Behavior normal.       Assessment:       1. Weakness    2. Nausea and vomiting, unspecified vomiting type    3. Alcohol use disorder, severe, dependence    4. Depression with anxiety        Plan:       Earl was seen today for hospital follow up.    Diagnoses and all orders for this visit:    Weakness   Per pt reported etoh use timeline should not be withdrawal causing presentation. Labs, EKG. Repeat CT due to recent abnormal CT and ongoing abdominal symptoms.  -     IN OFFICE EKG 12-LEAD (to Muse)  -     CBC Auto Differential; Future  -     Comprehensive Metabolic Panel; Future  -     TSH; Future  -     CULTURE, BLOOD; Future  -     Vitamin B12; Future  -     VITAMIN B1; Future    Nausea and vomiting, unspecified vomiting type  -     CT Abdomen Pelvis  Without Contrast; Future    Alcohol use disorder, severe, dependence   Needs a psychiatrist.   -     chlordiazepoxide (LIBRIUM) 25 MG Cap; Take 1 capsule  (25 mg total) by mouth 2 (two) times daily as needed for Anxiety.   Depression with anxiety               Andrew Chase MD  Internal Medicine-[facility]        Side effects of medication(s) were discussed in detail and patient voiced understanding.  Patient will call back for any issues or complications.

## 2023-06-06 NOTE — TELEPHONE ENCOUNTER
SHARMIN stating message below:    Dr. Chase's schedule today allows him to offer you to come in early if you would like. He is in no way asking you to come in early, especially if your schedule does not allow. We just wanted to let you know you are welcome to come in anytime between now and your scheduled time if this works out better for you. We will see you when you get here. If you are unable to come to today's appointment please notify us as soon as possible.

## 2023-06-07 LAB
PATH REPORT.FINAL DX SPEC: NORMAL
TP53 PRE-ANALYSIS CELL SORT: NORMAL

## 2023-06-11 LAB — BACTERIA BLD CULT: NORMAL

## 2023-06-12 LAB — VIT B1 BLD-MCNC: 136 UG/L (ref 38–122)

## 2023-06-14 RX ORDER — TRAZODONE HYDROCHLORIDE 300 MG/1
TABLET ORAL
Qty: 30 TABLET | Refills: 2 | OUTPATIENT
Start: 2023-06-14

## 2023-06-16 ENCOUNTER — HOSPITAL ENCOUNTER (INPATIENT)
Facility: HOSPITAL | Age: 63
LOS: 3 days | Discharge: HOME | DRG: 897 | End: 2023-06-22
Attending: EMERGENCY MEDICINE | Admitting: INTERNAL MEDICINE
Payer: COMMERCIAL

## 2023-06-16 DIAGNOSIS — R06.02 SOB (SHORTNESS OF BREATH): ICD-10-CM

## 2023-06-16 DIAGNOSIS — R09.02 HYPOXIA: ICD-10-CM

## 2023-06-16 DIAGNOSIS — R00.0 TACHYCARDIA: ICD-10-CM

## 2023-06-16 DIAGNOSIS — R07.9 CHEST PAIN: ICD-10-CM

## 2023-06-16 DIAGNOSIS — F10.939 ALCOHOL WITHDRAWAL: ICD-10-CM

## 2023-06-16 DIAGNOSIS — F41.9 ANXIETY: Primary | ICD-10-CM

## 2023-06-16 PROBLEM — E87.0 HYPERNATREMIA: Status: ACTIVE | Noted: 2023-06-16

## 2023-06-16 PROBLEM — J96.01 ACUTE HYPOXEMIC RESPIRATORY FAILURE: Status: ACTIVE | Noted: 2023-06-16

## 2023-06-16 LAB
ALBUMIN SERPL BCP-MCNC: 4.2 G/DL (ref 3.5–5.2)
ALP SERPL-CCNC: 48 U/L (ref 55–135)
ALT SERPL W/O P-5'-P-CCNC: 14 U/L (ref 10–44)
AMPHET+METHAMPHET UR QL: NEGATIVE
ANION GAP SERPL CALC-SCNC: 14 MMOL/L (ref 8–16)
APAP SERPL-MCNC: <3 UG/ML (ref 10–20)
AST SERPL-CCNC: 29 U/L (ref 10–40)
BARBITURATES UR QL SCN>200 NG/ML: NEGATIVE
BASOPHILS NFR BLD: 1 % (ref 0–1.9)
BENZODIAZ UR QL SCN>200 NG/ML: ABNORMAL
BILIRUB SERPL-MCNC: 0.4 MG/DL (ref 0.1–1)
BILIRUB UR QL STRIP: NEGATIVE
BUN SERPL-MCNC: 6 MG/DL (ref 8–23)
BZE UR QL SCN: NEGATIVE
CALCIUM SERPL-MCNC: 9.1 MG/DL (ref 8.7–10.5)
CANNABINOIDS UR QL SCN: NEGATIVE
CHLORIDE SERPL-SCNC: 106 MMOL/L (ref 95–110)
CLARITY UR REFRACT.AUTO: CLEAR
CO2 SERPL-SCNC: 27 MMOL/L (ref 23–29)
COLOR UR AUTO: YELLOW
CREAT SERPL-MCNC: 0.7 MG/DL (ref 0.5–1.4)
CREAT UR-MCNC: 53 MG/DL (ref 15–325)
DIFFERENTIAL METHOD: ABNORMAL
EOSINOPHIL NFR BLD: 0 % (ref 0–8)
ERYTHROCYTE [DISTWIDTH] IN BLOOD BY AUTOMATED COUNT: 17.3 % (ref 11.5–14.5)
EST. GFR  (NO RACE VARIABLE): >60 ML/MIN/1.73 M^2
ESTIMATED AVG GLUCOSE: 88 MG/DL (ref 68–131)
ETHANOL SERPL-MCNC: 215 MG/DL
GLUCOSE SERPL-MCNC: 76 MG/DL (ref 70–110)
GLUCOSE UR QL STRIP: NEGATIVE
HBA1C MFR BLD: 4.7 % (ref 4–5.6)
HCT VFR BLD AUTO: 38.7 % (ref 37–48.5)
HGB BLD-MCNC: 12 G/DL (ref 12–16)
HGB UR QL STRIP: NEGATIVE
IMM GRANULOCYTES # BLD AUTO: ABNORMAL K/UL (ref 0–0.04)
IMM GRANULOCYTES NFR BLD AUTO: ABNORMAL % (ref 0–0.5)
INR PPP: 1 (ref 0.8–1.2)
KETONES UR QL STRIP: NEGATIVE
LEUKOCYTE ESTERASE UR QL STRIP: NEGATIVE
LYMPHOCYTES NFR BLD: 79 % (ref 18–48)
MCH RBC QN AUTO: 28.4 PG (ref 27–31)
MCHC RBC AUTO-ENTMCNC: 31 G/DL (ref 32–36)
MCV RBC AUTO: 92 FL (ref 82–98)
METHADONE UR QL SCN>300 NG/ML: NEGATIVE
MONOCYTES NFR BLD: 0 % (ref 4–15)
NEUTROPHILS NFR BLD: 20 % (ref 38–73)
NITRITE UR QL STRIP: NEGATIVE
NRBC BLD-RTO: 0 /100 WBC
OPIATES UR QL SCN: NEGATIVE
OSMOLALITY SERPL: 307 MOSM/KG (ref 275–295)
PCP UR QL SCN>25 NG/ML: NEGATIVE
PH UR STRIP: 6 [PH] (ref 5–8)
PLATELET # BLD AUTO: 119 K/UL (ref 150–450)
PLATELET BLD QL SMEAR: ABNORMAL
PMV BLD AUTO: 10.6 FL (ref 9.2–12.9)
POCT GLUCOSE: 75 MG/DL (ref 70–110)
POCT GLUCOSE: 77 MG/DL (ref 70–110)
POTASSIUM SERPL-SCNC: 3.9 MMOL/L (ref 3.5–5.1)
PROT SERPL-MCNC: 7.1 G/DL (ref 6–8.4)
PROT UR QL STRIP: NEGATIVE
PROTHROMBIN TIME: 10.4 SEC (ref 9–12.5)
RBC # BLD AUTO: 4.22 M/UL (ref 4–5.4)
SODIUM SERPL-SCNC: 147 MMOL/L (ref 136–145)
SP GR UR STRIP: 1.01 (ref 1–1.03)
TOXICOLOGY INFORMATION: ABNORMAL
TSH SERPL DL<=0.005 MIU/L-ACNC: 1.28 UIU/ML (ref 0.4–4)
URN SPEC COLLECT METH UR: NORMAL
WBC # BLD AUTO: 17.74 K/UL (ref 3.9–12.7)

## 2023-06-16 PROCEDURE — 84443 ASSAY THYROID STIM HORMONE: CPT | Performed by: EMERGENCY MEDICINE

## 2023-06-16 PROCEDURE — 96372 THER/PROPH/DIAG INJ SC/IM: CPT | Performed by: INTERNAL MEDICINE

## 2023-06-16 PROCEDURE — 80307 DRUG TEST PRSMV CHEM ANLYZR: CPT | Performed by: EMERGENCY MEDICINE

## 2023-06-16 PROCEDURE — 85007 BL SMEAR W/DIFF WBC COUNT: CPT | Mod: NCS | Performed by: STUDENT IN AN ORGANIZED HEALTH CARE EDUCATION/TRAINING PROGRAM

## 2023-06-16 PROCEDURE — 80143 DRUG ASSAY ACETAMINOPHEN: CPT | Performed by: EMERGENCY MEDICINE

## 2023-06-16 PROCEDURE — 99223 1ST HOSP IP/OBS HIGH 75: CPT | Mod: ,,, | Performed by: INTERNAL MEDICINE

## 2023-06-16 PROCEDURE — 99223 PR INITIAL HOSPITAL CARE,LEVL III: ICD-10-PCS | Mod: ,,, | Performed by: INTERNAL MEDICINE

## 2023-06-16 PROCEDURE — 80321 ALCOHOLS BIOMARKERS 1OR 2: CPT | Performed by: INTERNAL MEDICINE

## 2023-06-16 PROCEDURE — 99285 EMERGENCY DEPT VISIT HI MDM: CPT | Mod: GC,,, | Performed by: EMERGENCY MEDICINE

## 2023-06-16 PROCEDURE — G0378 HOSPITAL OBSERVATION PER HR: HCPCS

## 2023-06-16 PROCEDURE — 25000003 PHARM REV CODE 250: Performed by: INTERNAL MEDICINE

## 2023-06-16 PROCEDURE — 27000221 HC OXYGEN, UP TO 24 HOURS

## 2023-06-16 PROCEDURE — 80053 COMPREHEN METABOLIC PANEL: CPT | Performed by: STUDENT IN AN ORGANIZED HEALTH CARE EDUCATION/TRAINING PROGRAM

## 2023-06-16 PROCEDURE — 96374 THER/PROPH/DIAG INJ IV PUSH: CPT | Mod: 59

## 2023-06-16 PROCEDURE — 63600175 PHARM REV CODE 636 W HCPCS: Performed by: INTERNAL MEDICINE

## 2023-06-16 PROCEDURE — 85027 COMPLETE CBC AUTOMATED: CPT | Performed by: STUDENT IN AN ORGANIZED HEALTH CARE EDUCATION/TRAINING PROGRAM

## 2023-06-16 PROCEDURE — 82962 GLUCOSE BLOOD TEST: CPT

## 2023-06-16 PROCEDURE — 85610 PROTHROMBIN TIME: CPT | Performed by: STUDENT IN AN ORGANIZED HEALTH CARE EDUCATION/TRAINING PROGRAM

## 2023-06-16 PROCEDURE — 93010 ELECTROCARDIOGRAM REPORT: CPT | Mod: ,,, | Performed by: INTERNAL MEDICINE

## 2023-06-16 PROCEDURE — 63600175 PHARM REV CODE 636 W HCPCS: Performed by: STUDENT IN AN ORGANIZED HEALTH CARE EDUCATION/TRAINING PROGRAM

## 2023-06-16 PROCEDURE — 96376 TX/PRO/DX INJ SAME DRUG ADON: CPT

## 2023-06-16 PROCEDURE — 63600175 PHARM REV CODE 636 W HCPCS: Performed by: EMERGENCY MEDICINE

## 2023-06-16 PROCEDURE — 99285 EMERGENCY DEPT VISIT HI MDM: CPT | Mod: 25

## 2023-06-16 PROCEDURE — 93010 EKG 12-LEAD: ICD-10-PCS | Mod: ,,, | Performed by: INTERNAL MEDICINE

## 2023-06-16 PROCEDURE — 99285 PR EMERGENCY DEPT VISIT,LEVEL V: ICD-10-PCS | Mod: GC,,, | Performed by: EMERGENCY MEDICINE

## 2023-06-16 PROCEDURE — 94761 N-INVAS EAR/PLS OXIMETRY MLT: CPT

## 2023-06-16 PROCEDURE — 83036 HEMOGLOBIN GLYCOSYLATED A1C: CPT | Performed by: INTERNAL MEDICINE

## 2023-06-16 PROCEDURE — 83930 ASSAY OF BLOOD OSMOLALITY: CPT | Performed by: INTERNAL MEDICINE

## 2023-06-16 PROCEDURE — 81003 URINALYSIS AUTO W/O SCOPE: CPT | Mod: 59 | Performed by: STUDENT IN AN ORGANIZED HEALTH CARE EDUCATION/TRAINING PROGRAM

## 2023-06-16 PROCEDURE — 99900035 HC TECH TIME PER 15 MIN (STAT)

## 2023-06-16 PROCEDURE — 82077 ASSAY SPEC XCP UR&BREATH IA: CPT | Performed by: EMERGENCY MEDICINE

## 2023-06-16 RX ORDER — PROCHLORPERAZINE EDISYLATE 5 MG/ML
2.5 INJECTION INTRAMUSCULAR; INTRAVENOUS EVERY 6 HOURS PRN
Status: DISCONTINUED | OUTPATIENT
Start: 2023-06-16 | End: 2023-06-22 | Stop reason: HOSPADM

## 2023-06-16 RX ORDER — NALTREXONE HYDROCHLORIDE 50 MG/1
50 TABLET, FILM COATED ORAL DAILY
Status: DISCONTINUED | OUTPATIENT
Start: 2023-06-17 | End: 2023-06-19

## 2023-06-16 RX ORDER — DIAZEPAM 10 MG/2ML
10 INJECTION INTRAMUSCULAR
Status: COMPLETED | OUTPATIENT
Start: 2023-06-16 | End: 2023-06-16

## 2023-06-16 RX ORDER — DICYCLOMINE HYDROCHLORIDE 20 MG/1
20 TABLET ORAL 4 TIMES DAILY
Status: DISCONTINUED | OUTPATIENT
Start: 2023-06-16 | End: 2023-06-17

## 2023-06-16 RX ORDER — TALC
6 POWDER (GRAM) TOPICAL NIGHTLY PRN
Status: DISCONTINUED | OUTPATIENT
Start: 2023-06-16 | End: 2023-06-22 | Stop reason: HOSPADM

## 2023-06-16 RX ORDER — SODIUM CHLORIDE 0.9 % (FLUSH) 0.9 %
10 SYRINGE (ML) INJECTION EVERY 12 HOURS PRN
Status: DISCONTINUED | OUTPATIENT
Start: 2023-06-16 | End: 2023-06-22 | Stop reason: HOSPADM

## 2023-06-16 RX ORDER — FLUTICASONE FUROATE AND VILANTEROL 100; 25 UG/1; UG/1
1 POWDER RESPIRATORY (INHALATION) DAILY
Status: DISCONTINUED | OUTPATIENT
Start: 2023-06-17 | End: 2023-06-22 | Stop reason: HOSPADM

## 2023-06-16 RX ORDER — HEPARIN SODIUM 5000 [USP'U]/ML
5000 INJECTION, SOLUTION INTRAVENOUS; SUBCUTANEOUS EVERY 8 HOURS
Status: DISCONTINUED | OUTPATIENT
Start: 2023-06-16 | End: 2023-06-22 | Stop reason: HOSPADM

## 2023-06-16 RX ORDER — DEXTROSE 40 %
30 GEL (GRAM) ORAL
Status: DISCONTINUED | OUTPATIENT
Start: 2023-06-16 | End: 2023-06-22 | Stop reason: HOSPADM

## 2023-06-16 RX ORDER — THIAMINE HCL 100 MG
100 TABLET ORAL DAILY
Status: DISCONTINUED | OUTPATIENT
Start: 2023-06-17 | End: 2023-06-22 | Stop reason: HOSPADM

## 2023-06-16 RX ORDER — LISINOPRIL 20 MG/1
20 TABLET ORAL DAILY
Status: DISCONTINUED | OUTPATIENT
Start: 2023-06-17 | End: 2023-06-22 | Stop reason: HOSPADM

## 2023-06-16 RX ORDER — NALOXONE HCL 0.4 MG/ML
0.02 VIAL (ML) INJECTION
Status: DISCONTINUED | OUTPATIENT
Start: 2023-06-16 | End: 2023-06-16

## 2023-06-16 RX ORDER — LORAZEPAM 1 MG/1
2 TABLET ORAL EVERY 4 HOURS PRN
Status: DISCONTINUED | OUTPATIENT
Start: 2023-06-16 | End: 2023-06-16

## 2023-06-16 RX ORDER — LEVOTHYROXINE SODIUM 50 UG/1
50 TABLET ORAL
Status: DISCONTINUED | OUTPATIENT
Start: 2023-06-17 | End: 2023-06-22 | Stop reason: HOSPADM

## 2023-06-16 RX ORDER — DEXTROSE 40 %
15 GEL (GRAM) ORAL
Status: DISCONTINUED | OUTPATIENT
Start: 2023-06-16 | End: 2023-06-22 | Stop reason: HOSPADM

## 2023-06-16 RX ORDER — NALOXONE HCL 0.4 MG/ML
0.02 VIAL (ML) INJECTION
Status: DISCONTINUED | OUTPATIENT
Start: 2023-06-16 | End: 2023-06-22 | Stop reason: HOSPADM

## 2023-06-16 RX ORDER — LORAZEPAM 2 MG/ML
1 INJECTION INTRAMUSCULAR
Status: COMPLETED | OUTPATIENT
Start: 2023-06-16 | End: 2023-06-16

## 2023-06-16 RX ORDER — GLUCAGON 1 MG
1 KIT INJECTION
Status: DISCONTINUED | OUTPATIENT
Start: 2023-06-16 | End: 2023-06-22 | Stop reason: HOSPADM

## 2023-06-16 RX ORDER — TRAZODONE HYDROCHLORIDE 100 MG/1
300 TABLET ORAL NIGHTLY
Status: DISCONTINUED | OUTPATIENT
Start: 2023-06-16 | End: 2023-06-19

## 2023-06-16 RX ORDER — DIAZEPAM 5 MG/1
10 TABLET ORAL EVERY 8 HOURS
Status: DISCONTINUED | OUTPATIENT
Start: 2023-06-16 | End: 2023-06-19

## 2023-06-16 RX ORDER — SODIUM CHLORIDE 9 MG/ML
INJECTION, SOLUTION INTRAVENOUS CONTINUOUS
Status: ACTIVE | OUTPATIENT
Start: 2023-06-16 | End: 2023-06-17

## 2023-06-16 RX ORDER — INSULIN ASPART 100 [IU]/ML
0-5 INJECTION, SOLUTION INTRAVENOUS; SUBCUTANEOUS
Status: DISCONTINUED | OUTPATIENT
Start: 2023-06-16 | End: 2023-06-22 | Stop reason: HOSPADM

## 2023-06-16 RX ORDER — ONDANSETRON 2 MG/ML
4 INJECTION INTRAMUSCULAR; INTRAVENOUS EVERY 6 HOURS PRN
Status: DISCONTINUED | OUTPATIENT
Start: 2023-06-16 | End: 2023-06-22 | Stop reason: HOSPADM

## 2023-06-16 RX ORDER — PANTOPRAZOLE SODIUM 40 MG/1
40 TABLET, DELAYED RELEASE ORAL DAILY
Status: DISCONTINUED | OUTPATIENT
Start: 2023-06-17 | End: 2023-06-22 | Stop reason: HOSPADM

## 2023-06-16 RX ORDER — ACETAMINOPHEN 325 MG/1
650 TABLET ORAL EVERY 8 HOURS PRN
Status: DISCONTINUED | OUTPATIENT
Start: 2023-06-16 | End: 2023-06-22 | Stop reason: HOSPADM

## 2023-06-16 RX ORDER — ALBUTEROL SULFATE 90 UG/1
2 AEROSOL, METERED RESPIRATORY (INHALATION) EVERY 6 HOURS PRN
Status: DISCONTINUED | OUTPATIENT
Start: 2023-06-16 | End: 2023-06-22 | Stop reason: HOSPADM

## 2023-06-16 RX ORDER — LOPERAMIDE HYDROCHLORIDE 2 MG/1
2 CAPSULE ORAL 4 TIMES DAILY PRN
Status: DISCONTINUED | OUTPATIENT
Start: 2023-06-16 | End: 2023-06-22 | Stop reason: HOSPADM

## 2023-06-16 RX ORDER — DIAZEPAM 5 MG/1
5 TABLET ORAL EVERY 8 HOURS
Status: DISCONTINUED | OUTPATIENT
Start: 2023-06-16 | End: 2023-06-16

## 2023-06-16 RX ORDER — LORAZEPAM 2 MG/ML
2 INJECTION INTRAMUSCULAR EVERY 4 HOURS PRN
Status: DISCONTINUED | OUTPATIENT
Start: 2023-06-16 | End: 2023-06-22 | Stop reason: HOSPADM

## 2023-06-16 RX ORDER — LORAZEPAM 2 MG/ML
1 INJECTION INTRAMUSCULAR EVERY 4 HOURS PRN
Status: DISCONTINUED | OUTPATIENT
Start: 2023-06-16 | End: 2023-06-16

## 2023-06-16 RX ORDER — IBUPROFEN 400 MG/1
400 TABLET ORAL EVERY 6 HOURS PRN
Status: DISCONTINUED | OUTPATIENT
Start: 2023-06-16 | End: 2023-06-22 | Stop reason: HOSPADM

## 2023-06-16 RX ORDER — FOLIC ACID 1 MG/1
1 TABLET ORAL DAILY
Status: DISCONTINUED | OUTPATIENT
Start: 2023-06-17 | End: 2023-06-22 | Stop reason: HOSPADM

## 2023-06-16 RX ORDER — POLYETHYLENE GLYCOL 3350 17 G/17G
17 POWDER, FOR SOLUTION ORAL 2 TIMES DAILY PRN
Status: DISCONTINUED | OUTPATIENT
Start: 2023-06-16 | End: 2023-06-22 | Stop reason: HOSPADM

## 2023-06-16 RX ADMIN — DIAZEPAM 10 MG: 5 INJECTION, SOLUTION INTRAMUSCULAR; INTRAVENOUS at 11:06

## 2023-06-16 RX ADMIN — Medication 6 MG: at 09:06

## 2023-06-16 RX ADMIN — HEPARIN SODIUM 5000 UNITS: 5000 INJECTION INTRAVENOUS; SUBCUTANEOUS at 09:06

## 2023-06-16 RX ADMIN — SODIUM CHLORIDE: 9 INJECTION, SOLUTION INTRAVENOUS at 06:06

## 2023-06-16 RX ADMIN — TRAZODONE HYDROCHLORIDE 300 MG: 100 TABLET ORAL at 09:06

## 2023-06-16 RX ADMIN — DICYCLOMINE HYDROCHLORIDE 20 MG: 20 TABLET ORAL at 09:06

## 2023-06-16 RX ADMIN — LORAZEPAM 1 MG: 2 INJECTION INTRAMUSCULAR; INTRAVENOUS at 01:06

## 2023-06-16 RX ADMIN — DIAZEPAM 10 MG: 5 TABLET ORAL at 07:06

## 2023-06-16 NOTE — ED NOTES
Care assumed & bedside report received. Pt AAO x 4 w/ RR tachy. VSS on RA. Denies CP or SOB. Pt denies SI or HI at this time. Denies delusions or hallucinations. Pt dressed in facility provided blue scrubs. Environmental safety protocol in place.  is at the bedside. Sitter at the bedside performing 15 min RR checks. PEC form w/ . Pt belongings in safe & closet.

## 2023-06-16 NOTE — ED TRIAGE NOTES
Received from triage via w/c intoxicated. Unsteady on feet.  at side and states pt fell this morning hitting her face; is alcoholic/ daily drinker.  Noted redness/ abrasions to right side of face.  Pt with hx of bilateral mastectomies for breast CA; leukemia; COPD  wears O2 at night      Patient identifiers verified and correct for telly durand  LOC: The patient is awake, alert and oriented x3. Calm at this time  APPEARANCE: Patient appears comfortable and in no acute distress, patient is clean and well groomed.  SKIN: The skin is warm and dry, color consistent with ethnicity, patient has normal skin turgor and moist mucus membranes, skin intact, pt denies breakdown or bruising  MUSCULOSKELETAL: Patient moving all extremities spontaneously, no edema noted.moves indepdently but unsteady  RESPIRATORY: Airway is open and patent, respirations are spontaneous, patient has a normal effort and rate, no accessory muscle use noted, pt placed on continuous pulse ox with O2 sats noted at 92-93% on room air  CARDIAC: pt placed on cardiac monitor. Denies chest pain. NSR noted on monitor  GASTRO: soft and non-tender to palpation. Denies n/v/d/abd pain  : reports dysuria  NEURO: intoxicated, slurred speech.

## 2023-06-16 NOTE — NURSING
REPORT REC., PT ARRIVED TO UNIT WITH ESCORT VIA WC, REPORTS ANXIETY AND DEPRESSION, DENIES SI/HI/AVH, TREMORS NOTED, FULL ASSESSMENT COMPLETED, SAFETY MEASURES INTACT, SITTER AT BS, WILL MONITOR.

## 2023-06-16 NOTE — NURSING
Nurses Note -- 4 Eyes      6/16/2023   5:47 PM      Skin assessed during: {4eyes ATSD- System List:03630}      [] No Altered Skin Integrity Present    [x]Prevention Measures Documented      [x] Yes- Altered Skin Integrity Present or Discovered   [] LDA Added if Not in Epic (Describe Wound)   [] New Altered Skin Integrity was Present on Admit and Documented in LDA   [] Wound Image Taken  R EYE BRUISED, PREVIOUSLY CAPTURED BY ED NURSE  Wound Care Consulted? {YES/NO:74816}    Attending Nurse:  Zita Yepez RN     Second RN/Staff Member:  CLAUDIA MENDOZA

## 2023-06-16 NOTE — SUBJECTIVE & OBJECTIVE
Past Medical History:   Diagnosis Date    Anemia     Anemia     Controlled type 2 diabetes mellitus without complication, without long-term current use of insulin 11/30/2021    COPD (chronic obstructive pulmonary disease)     Depression     Diverticulitis     Fatty liver     GERD (gastroesophageal reflux disease)     Hyperlipidemia     Hypertension     Pancreatitis     Peptic ulcer disease     Polysubstance abuse     Posterior reversible encephalopathy syndrome     Sarcoidosis of lung     Sarcoidosis of lung     over 30 yrs ago    Seizures     7/2017    Suicide attempt     Suicide ideation        Past Surgical History:   Procedure Laterality Date    APPENDECTOMY      BILATERAL MASTECTOMY Bilateral 10/29/2020    Procedure: MASTECTOMY, BILATERAL;  Surgeon: Baylee Kevin MD;  Location: [address];  Service: General;  Laterality: Bilateral;    BREAST REVISION SURGERY Bilateral 2/11/2021    Procedure: BREAST REVISION SURGERY;  Surgeon: Scottie Johnson MD;  Location: [facility] OR [address] [place] [address];  Service: Plastics;  Laterality: Bilateral;    COLONOSCOPY N/A 7/28/2017    Procedure: COLONOSCOPY;  Surgeon: Aaron Alvarado MD;  Location: [facility];  Service: Endoscopy;  Laterality: N/A;    ESOPHAGOGASTRODUODENOSCOPY  10/7/2016, 11/6/2014    2016 - gastritis, duodenitis, 2014 erosive gastritis    ESOPHAGOGASTRODUODENOSCOPY N/A 2/11/2020    Procedure: ESOPHAGOGASTRODUODENOSCOPY (EGD);  Surgeon: Fawn Garrido MD;  Location: [facility];  Service: Endoscopy;  Laterality: N/A;    ESOPHAGOGASTRODUODENOSCOPY N/A 4/19/2021    Procedure: EGD (ESOPHAGOGASTRODUODENOSCOPY);  Surgeon: Paramjit Martino MD;  Location: [facility];  Service: Endoscopy;  Laterality: N/A;    FLEXIBLE SIGMOIDOSCOPY  11/06/2014    colitis    HYSTERECTOMY      IMPLANTATION OF PERMANENT SACRAL NERVE STIMULATOR N/A 7/12/2022    Procedure: INSERTION, NEUROSTIMULATOR, PERMANENT, SACRAL;  Surgeon: Juaquin Edwards MD;  Location: [facility] OR [address] [place] [address];  Service:  Urology;  Laterality: N/A;  1hr    INJECTION FOR SENTINEL NODE IDENTIFICATION Right 10/29/2020    Procedure: INJECTION, FOR SENTINEL NODE IDENTIFICATION;  Surgeon: Baylee Kevin MD;  [facility];  Service: General;  Laterality: Right;    INJECTION OF JOINT Right 10/10/2019    Procedure: Injection, Joint RIGHT ILIOPSOAS BURSA/TENDON INJECTION AND RIGHT GLUTEAL TENDON INJECTION WITH STEROID AND LIDOCAINE;  Surgeon: Guillaume Rico MD;  Location: [facility];  Service: Pain Management;  Laterality: Right;  NEEDS CONSENT    INSERTION OF BREAST TISSUE EXPANDER Bilateral 10/29/2020    Procedure: INSERTION, TISSUE EXPANDER, BREAST;  Surgeon: Scottie Johnson MD;  Location: [facility] OR [address] [place] [address];  Service: Plastics;  Laterality: Bilateral;  Right breast: 1082 g  Left breast: 1076 g    LIPOSUCTION Bilateral 2/11/2021    Procedure: LIPOSUCTION;  Surgeon: Scottie Johnson MD;  Location: [facility] OR [address] [place] [address];  Service: Plastics;  Laterality: Bilateral;    mediastenoscopy      REPLACEMENT OF IMPLANT OF BREAST Bilateral 2/11/2021    Procedure: REPLACEMENT, IMPLANT, BREAST;  Surgeon: Scottie Johnson MD;  Location: [address];  Service: Plastics;  Laterality: Bilateral;    SENTINEL LYMPH NODE BIOPSY Right 10/29/2020    Procedure: BIOPSY, LYMPH NODE, SENTINEL;  Surgeon: Baylee Kevin MD;  Location: [address];  Service: General;  Laterality: Right;    TONSILLECTOMY N/A 1970    TUBAL LIGATION         Review of patient's allergies indicates:   Allergen Reactions    Lortab [hydrocodone-acetaminophen] Itching    Promethazine Itching and Other (See Comments)       Current Facility-Administered Medications on File Prior to Encounter   Medication    albuterol sulfate nebulizer solution 2.5 mg     Current Outpatient Medications on File Prior to Encounter   Medication Sig    acetaminophen (TYLENOL) 500 MG tablet Take 500-1,500 mg by mouth daily as needed for Pain.    albuterol (PROVENTIL/VENTOLIN HFA) 90  mcg/actuation inhaler     chlordiazepoxide (LIBRIUM) 25 MG Cap Take 1 capsule (25 mg total) by mouth 2 (two) times daily as needed for Anxiety.    dicyclomine (BENTYL) 20 mg tablet Take 20 mg by mouth 4 (four) times daily.    DULoxetine (CYMBALTA) 30 MG capsule Take 3 capsules (90 mg total) by mouth once daily.    DULoxetine (CYMBALTA) 60 MG capsule Take 60 mg by mouth.    fluticasone furoate-vilanteroL (BREO ELLIPTA) 100-25 mcg/dose diskus inhaler Inhale 1 puff into the lungs once daily. Controller    folic acid (FOLVITE) 1 MG tablet Take 1 tablet (1 mg total) by mouth once daily.    gabapentin (NEURONTIN) 600 MG tablet Take 600 mg by mouth 3 (three) times daily.    levothyroxine (SYNTHROID) 50 MCG tablet Take 1 tablet (50 mcg total) by mouth before breakfast.    lisinopriL (PRINIVIL,ZESTRIL) 20 MG tablet Take 1 tablet (20 mg total) by mouth once daily.    loperamide (IMODIUM) 2 mg capsule Take 2 mg by mouth 4 (four) times daily as needed for Diarrhea (Per package directions).    melatonin (MELATIN) Take by mouth nightly as needed for Insomnia.    metFORMIN (GLUCOPHAGE-XR) 500 MG ER 24hr tablet Take 2 tablets (1,000 mg total) by mouth 2 (two) times daily with meals.    multivitamin Tab Take 1 tablet by mouth once daily.    naltrexone (DEPADE) 50 mg tablet Take 1 tablet (50 mg) by mouth once daily.    ondansetron (ZOFRAN-ODT) 4 MG TbDL Take 1 tablet (4 mg total) by mouth every 6 (six) hours as needed (nausea).    pantoprazole (PROTONIX) 40 MG tablet Take 40 mg by mouth once daily.    thiamine 100 MG tablet Take 1 tablet (100 mg total) by mouth once daily.    traZODone (DESYREL) 300 MG tablet TAKE 1 TABLET BY MOUTH AT BEDTIME     Family History       Problem Relation (Age of Onset)    Breast cancer Maternal Aunt, Daughter    Colon cancer Maternal Uncle    Diabetes Father, Mother    Heart attack Father    Hypertension Father, Mother          Tobacco Use    Smoking status: Former     Packs/day: 0.50     Years: 30.00      Pack years: 15.00     Types: Vaping with nicotine, Cigarettes     Quit date: 2021     Years since quittin.3    Smokeless tobacco: Never   Substance and Sexual Activity    Alcohol use: Yes     Comment: vodka daily (half a regular bottle)    Drug use: Yes     Types: Marijuana     Comment: gummies    Sexual activity: Yes     Birth control/protection: Surgical     Review of Systems   Constitutional:  Positive for activity change, appetite change and fatigue. Negative for chills and fever.   HENT:  Negative for congestion and trouble swallowing.    Eyes:  Negative for visual disturbance.   Respiratory:  Positive for shortness of breath. Negative for cough.    Cardiovascular:  Negative for chest pain and leg swelling.   Gastrointestinal:  Negative for abdominal distention, abdominal pain, nausea and vomiting.   Endocrine: Negative for cold intolerance, heat intolerance, polydipsia and polyuria.   Genitourinary:  Negative for difficulty urinating and dysuria.   Musculoskeletal:  Positive for myalgias. Negative for back pain.   Skin:  Negative for rash and wound.   Neurological:  Positive for tremors and weakness. Negative for dizziness and light-headedness.   Hematological:  Negative for adenopathy. Does not bruise/bleed easily.   Psychiatric/Behavioral:  Positive for decreased concentration, sleep disturbance and suicidal ideas. Negative for confusion and hallucinations. The patient is nervous/anxious.      Objective:     Vital Signs (Most Recent):  Temp: 98.5 °F (36.9 °C) (23 0752)  Pulse: 108 (23 1535)  Resp: (!) 22 (23 0851)  BP: (!) 115/55 (23 1121)  SpO2: 97 % (23 1124) Vital Signs (24h Range):  Temp:  [98.5 °F (36.9 °C)] 98.5 °F (36.9 °C)  Pulse:  [] 108  Resp:  [16-22] 22  SpO2:  [88 %-97 %] 97 %  BP: (115-152)/(55-77) 115/55     Weight: 73.9 kg (163 lb)  Body mass index is 26.31 kg/m².     Physical Exam           Significant Labs: A1C:   Recent Labs   Lab 22  0511  03/26/23  1656   HGBA1C 4.6 5.1     CBC:   Recent Labs   Lab 06/16/23 0918   WBC 17.74*   HGB 12.0   HCT 38.7   *     CMP:   Recent Labs   Lab 06/16/23 0918   *   K 3.9      CO2 27   GLU 76   BUN 6*   CREATININE 0.7   CALCIUM 9.1   PROT 7.1   ALBUMIN 4.2   BILITOT 0.4   ALKPHOS 48*   AST 29   ALT 14   ANIONGAP 14     Coagulation:   Recent Labs   Lab 06/16/23 0918   INR 1.0     Magnesium: No results for input(s): MG in the last 48 hours.  POCT Glucose:   Recent Labs   Lab 06/16/23  1228   POCTGLUCOSE 77     Troponin: No results for input(s): TROPONINI, TROPONINIHS in the last 48 hours.  TSH:   Recent Labs   Lab 06/16/23 0918   TSH 1.283     Urine Studies:   Recent Labs   Lab 06/16/23  1220   COLORU Yellow   APPEARANCEUA Clear   PHUR 6.0   SPECGRAV 1.010   PROTEINUA Negative   GLUCUA Negative   KETONESU Negative   BILIRUBINUA Negative   OCCULTUA Negative   NITRITE Negative   LEUKOCYTESUR Negative       Significant Imaging: I have reviewed all pertinent imaging results/findings within the past 24 hours.

## 2023-06-16 NOTE — ED PROVIDER NOTES
Encounter Date: 6/16/2023       History     Chief Complaint   Patient presents with    Fall     Per family, drinking heavily for the last week or so, recent falls. Most recent this morning, + head trauma, R eye bruising, - AC. PT states she drinks a bottle of vodka a day (750 ml)     63-year-old female with history of diabetes, hypertension, hyperlipidemia, breast cancer status post treatment, CLL, alcohol dependence presenting to the ED with multiple falls.  Patient drank a lot of alcohol today, was walking around lost balance and fell, positive head trauma, positive LOC.  Denies any vomiting afterwards.  Denies any numbness or weakness in her arms or legs.  Denies any chest pain, abdominal pain, shortness breath, urinary symptoms.      Review of patient's allergies indicates:   Allergen Reactions    Lortab [hydrocodone-acetaminophen] Itching    Promethazine Itching and Other (See Comments)     Past Medical History:   Diagnosis Date    Anemia     Anemia     Controlled type 2 diabetes mellitus without complication, without long-term current use of insulin 11/30/2021    COPD (chronic obstructive pulmonary disease)     Depression     Diverticulitis     Fatty liver     GERD (gastroesophageal reflux disease)     Hyperlipidemia     Hypertension     Pancreatitis     Peptic ulcer disease     Polysubstance abuse     Posterior reversible encephalopathy syndrome     Sarcoidosis of lung     Sarcoidosis of lung     over 30 yrs ago    Seizures     7/2017    Suicide attempt     Suicide ideation      Past Surgical History:   Procedure Laterality Date    APPENDECTOMY      BILATERAL MASTECTOMY Bilateral 10/29/2020    Procedure: MASTECTOMY, BILATERAL;  Surgeon: Baylee Kevin MD;  Location: [facility] OR [address] [place] [address];  Service: General;  Laterality: Bilateral;    BREAST REVISION SURGERY Bilateral 2/11/2021    Procedure: BREAST REVISION SURGERY;  Surgeon: Scottie Johnson MD;  Location: [facility] OR [address] [place] [address];  Service: Plastics;  Laterality:  Bilateral;    COLONOSCOPY N/A 7/28/2017    Procedure: COLONOSCOPY;  Surgeon: Aaron Alvarado MD;  Location: [facility] ENDO;  Service: Endoscopy;  Laterality: N/A;    ESOPHAGOGASTRODUODENOSCOPY  10/7/2016, 11/6/2014    2016 - gastritis, duodenitis, 2014 erosive gastritis    ESOPHAGOGASTRODUODENOSCOPY N/A 2/11/2020    Procedure: ESOPHAGOGASTRODUODENOSCOPY (EGD);  Surgeon: Fawn Garrido MD;  Location: [facility] ENDO;  Service: Endoscopy;  Laterality: N/A;    ESOPHAGOGASTRODUODENOSCOPY N/A 4/19/2021    Procedure: EGD (ESOPHAGOGASTRODUODENOSCOPY);  Surgeon: Paramjit Martino MD;  Location: [facility] ENDO;  Service: Endoscopy;  Laterality: N/A;    FLEXIBLE SIGMOIDOSCOPY  11/06/2014    colitis    HYSTERECTOMY      IMPLANTATION OF PERMANENT SACRAL NERVE STIMULATOR N/A 7/12/2022    Procedure: INSERTION, NEUROSTIMULATOR, PERMANENT, SACRAL;  Surgeon: Juaquin Edwards MD;  Location: [facility] OR [address] [place] [address];  Service: Urology;  Laterality: N/A;  1hr    INJECTION FOR SENTINEL NODE IDENTIFICATION Right 10/29/2020    Procedure: INJECTION, FOR SENTINEL NODE IDENTIFICATION;  Surgeon: Baylee Kevin MD;  Location: [facility] OR [address] [place] [address];  Service: General;  Laterality: Right;    INJECTION OF JOINT Right 10/10/2019    Procedure: Injection, Joint RIGHT ILIOPSOAS BURSA/TENDON INJECTION AND RIGHT GLUTEAL TENDON INJECTION WITH STEROID AND LIDOCAINE;  Surgeon: Guillaume Rico MD;  Location: [facility];  Service: Pain Management;  Laterality: Right;  NEEDS CONSENT    INSERTION OF BREAST TISSUE EXPANDER Bilateral 10/29/2020    Procedure: INSERTION, TISSUE EXPANDER, BREAST;  Surgeon: Scottie Johnson MD;  [facility];  Service: Plastics;  Laterality: Bilateral;  Right breast: 1082 g  Left breast: 1076 g    LIPOSUCTION Bilateral 2/11/2021    Procedure: LIPOSUCTION;  Surgeon: Scottie Johnson MD;  [facility];  Service: Plastics;  Laterality: Bilateral;    mediastenoscopy      REPLACEMENT OF IMPLANT OF BREAST Bilateral  2021    Procedure: REPLACEMENT, IMPLANT, BREAST;  Surgeon: Scottie Johnson MD;  [facility];  Service: Plastics;  Laterality: Bilateral;    SENTINEL LYMPH NODE BIOPSY Right 10/29/2020    Procedure: BIOPSY, LYMPH NODE, SENTINEL;  Surgeon: Baylee Kevin MD;  Location: [facility] OR 2ND FLR;  Service: General;  Laterality: Right;    TONSILLECTOMY N/A 1970    TUBAL LIGATION       Family History   Problem Relation Age of Onset    Heart attack Father     Diabetes Father     Hypertension Father     Diabetes Mother     Hypertension Mother     Breast cancer Maternal Aunt     Colon cancer Maternal Uncle     Breast cancer Daughter     Ovarian cancer Neg Hx     Cancer Neg Hx      Social History     Tobacco Use    Smoking status: Former     Packs/day: 0.50     Years: 30.00     Pack years: 15.00     Types: Vaping with nicotine, Cigarettes     Quit date: 2021     Years since quittin.3    Smokeless tobacco: Never   Substance Use Topics    Alcohol use: Yes     Comment: vodka daily (half a regular bottle)    Drug use: Yes     Types: Marijuana     Comment: gummies     Review of Systems    Physical Exam     Initial Vitals [23 0752]   BP Pulse Resp Temp SpO2   (!) 118/58 80 16 98.5 °F (36.9 °C) (!) 92 %      MAP       --         Physical Exam    Nursing note and vitals reviewed.  Constitutional: She appears well-developed and well-nourished. She is not diaphoretic. No distress.   HENT:   Head: Normocephalic.   Abrasion noted below right eye   Eyes: Conjunctivae and EOM are normal. Right eye exhibits no discharge. Left eye exhibits no discharge. No scleral icterus.   Neck:   Normal range of motion.  Cardiovascular:  Normal rate and regular rhythm.     Exam reveals no gallop and no friction rub.       No murmur heard.  Pulmonary/Chest: No respiratory distress. She has no wheezes. She has no rhonchi. She has no rales. She exhibits no tenderness.   Abdominal: Abdomen is soft. She exhibits no distension  and no mass. There is no abdominal tenderness. There is no rebound and no guarding.   Musculoskeletal:      Cervical back: Normal range of motion.     Neurological: She is alert and oriented to person, place, and time. She has normal strength. No cranial nerve deficit or sensory deficit.   Skin: Skin is warm and dry. No erythema. No pallor.       ED Course   Procedures  Labs Reviewed   CBC W/ AUTO DIFFERENTIAL - Abnormal; Notable for the following components:       Result Value    WBC 17.74 (*)     MCHC 31.0 (*)     RDW 17.3 (*)     Platelets 119 (*)     Gran % 20.0 (*)     Lymph % 79.0 (*)     Mono % 0.0 (*)     Platelet Estimate Decreased (*)     All other components within normal limits   COMPREHENSIVE METABOLIC PANEL - Abnormal; Notable for the following components:    Sodium 147 (*)     BUN 6 (*)     Alkaline Phosphatase 48 (*)     All other components within normal limits   ALCOHOL,MEDICAL (ETHANOL) - Abnormal; Notable for the following components:    Alcohol, Serum 215 (*)     All other components within normal limits   ACETAMINOPHEN LEVEL - Abnormal; Notable for the following components:    Acetaminophen (Tylenol), Serum <3.0 (*)     All other components within normal limits   URINALYSIS, REFLEX TO URINE CULTURE    Narrative:     Specimen Source->Urine   PROTIME-INR   TSH   DRUG SCREEN PANEL, URINE EMERGENCY   ALCOHOL,MEDICAL (ETHANOL)   ACETAMINOPHEN LEVEL   TYPE & SCREEN   POCT GLUCOSE MONITORING CONTINUOUS          Imaging Results              X-Ray Chest AP Portable (Final result)  Result time 06/16/23 11:43:25      Final result by Luis Holder MD (06/16/23 11:43:25)                   Impression:      See above      Electronically signed by: Luis Holder MD  Date:    06/16/2023  Time:    11:43               Narrative:    EXAMINATION:  XR CHEST AP PORTABLE    CLINICAL HISTORY:  Shortness of breath    TECHNIQUE:  Single frontal view of the chest was performed.    COMPARISON:  N 05/15/2023  one    FINDINGS:  Heart size normal.  The lungs are clear.  No pleural effusion.  Surgical changes in the breast identified similar to the previous study                                       CT Head Without Contrast (Final result)  Result time 06/16/23 11:06:29      Final result by Kevin Miller MD (06/16/23 11:06:29)                   Impression:      1. No acute intracranial findings.  2. Recent appearing mild displaced left nasal bone fracture.  No additional recent appearing for facial bone fractures identified.  3. No acute cervical spine fracture.  4. Extensive cervical and supraclavicular adenopathy.  Continued attention on follow-up recommended.  5. Additional details, as provided in the body of report.      Electronically signed by: Kevin Miller  Date:    06/16/2023  Time:    11:06               Narrative:    EXAMINATION:  CT HEAD WITHOUT CONTRAST; CT MAXILLOFACIAL WITHOUT CONTRAST; CT CERVICAL SPINE WITHOUT CONTRAST    CLINICAL HISTORY:  Head trauma, minor (Age >= 65y);; Facial trauma, blunt;; Neck trauma (Age >= 65y);    TECHNIQUE:  Low dose axial images were obtained through the head, cervical spine, and facial bones.  Coronal and sagittal reformations were also performed. Contrast was not administered.    COMPARISON:  CT head 05/15/2023.    FINDINGS:  Head:    Blood: No acute intracranial hemorrhage.    Parenchyma: No definite loss of gray-white differentiation to suggest acute or subacute transcortical infarct. Remote lacunar infarcts versus prominent perivascular spaces inferior left basal ganglia, as before.  Generalized pattern age-related parenchymal volume loss.  Nonspecific areas of white matter hypoattenuation may relate to sequela of chronic small vessel ischemic disease.    Ventricles/Extra-axial spaces: No abnormal extra-axial fluid collection. Basal cisterns are patent.    Vessels: Mild-to-moderate atherosclerotic calcification.    Orbits: See below.    Scalp: Unremarkable.    Skull:  There are no depressed skull fractures or destructive bone lesions.    Sinuses and mastoids: See below.    Other findings: None    Facial bones:    Acute facial fractures: Recent appearing mild displaced nasal bone fracture, with mild leftward deviation of the nasal arch, new since head CT 04/01/2023.  Overlying soft tissue swelling is noted without definite radiopaque foreign body.  Nasal septum appears intact.  Nasolacrimal ducts appear uninvolved.    No definite additional recent appearing facial bone fracture seen.    Pterygoid plates, Zygomatic arches, Sphenotemporal buttresses: Intact.    Nasal Bones: As above.    Mandible: The mandible is intact.  Leftward asymmetric TMJ DJD.    Dentition: No tooth fracture. No periapical lucencies.    Sinuses: There are no fluid levels in the sinuses.    Imaged mastoids and middle ears: Partial opacification of the dependent right mastoid air cells, possibly on the basis of mucosal thickening and or effusion.    Imaged upper cervical spine: See below.    Facial soft tissues: See above.    Orbits: The bony orbits and orbital contents are atraumatic.    Imaged intracranial structures and upper aerodigestive tract: Unremarkable.    Cervical spine:    Comment: Motion compromised examination.    Fractures: No acute fractures    Alignment: Straightening of anticipated cervical lordosis.  Grade 1 anterolisthesis of C4 on C5.  Atlanto-axial and atlanto-occipital joints: Atlanto-axial and atlanto-occipital intervals are not widened.  Facet joints: There is no traumatic facet joint widening.Degenerative facet arthropathy  Vertebral bodies: Heterogeneous marrow attenuation.  Appearance relatively similar to osseous structures seen on abdomen/pelvis CT 04/26/2023 and chest, abdomen, and pelvis CT 03/13/2023.  Discs: Degenerative disc osteophyte complex formation.  Spinal canal and foraminal narrowing: Although CT does not optimally evaluate the soft tissue contents of the spinal canal  and foramina, no critical stenosis is suggested.    At C5-6, suspect moderate left foraminal narrowing      Paraspinal soft tissues: Extensive cervical and supraclavicular adenopathy.    Upper Lungs:Clear                                       CT Maxillofacial Without Contrast (Final result)  Result time 06/16/23 11:06:29      Final result by Kevin Miller MD (06/16/23 11:06:29)                   Impression:      1. No acute intracranial findings.  2. Recent appearing mild displaced left nasal bone fracture.  No additional recent appearing for facial bone fractures identified.  3. No acute cervical spine fracture.  4. Extensive cervical and supraclavicular adenopathy.  Continued attention on follow-up recommended.  5. Additional details, as provided in the body of report.      Electronically signed by: Kevin Miller  Date:    06/16/2023  Time:    11:06               Narrative:    EXAMINATION:  CT HEAD WITHOUT CONTRAST; CT MAXILLOFACIAL WITHOUT CONTRAST; CT CERVICAL SPINE WITHOUT CONTRAST    CLINICAL HISTORY:  Head trauma, minor (Age >= 65y);; Facial trauma, blunt;; Neck trauma (Age >= 65y);    TECHNIQUE:  Low dose axial images were obtained through the head, cervical spine, and facial bones.  Coronal and sagittal reformations were also performed. Contrast was not administered.    COMPARISON:  CT head 05/15/2023.    FINDINGS:  Head:    Blood: No acute intracranial hemorrhage.    Parenchyma: No definite loss of gray-white differentiation to suggest acute or subacute transcortical infarct. Remote lacunar infarcts versus prominent perivascular spaces inferior left basal ganglia, as before.  Generalized pattern age-related parenchymal volume loss.  Nonspecific areas of white matter hypoattenuation may relate to sequela of chronic small vessel ischemic disease.    Ventricles/Extra-axial spaces: No abnormal extra-axial fluid collection. Basal cisterns are patent.    Vessels: Mild-to-moderate atherosclerotic  calcification.    Orbits: See below.    Scalp: Unremarkable.    Skull: There are no depressed skull fractures or destructive bone lesions.    Sinuses and mastoids: See below.    Other findings: None    Facial bones:    Acute facial fractures: Recent appearing mild displaced nasal bone fracture, with mild leftward deviation of the nasal arch, new since head CT 04/01/2023.  Overlying soft tissue swelling is noted without definite radiopaque foreign body.  Nasal septum appears intact.  Nasolacrimal ducts appear uninvolved.    No definite additional recent appearing facial bone fracture seen.    Pterygoid plates, Zygomatic arches, Sphenotemporal buttresses: Intact.    Nasal Bones: As above.    Mandible: The mandible is intact.  Leftward asymmetric TMJ DJD.    Dentition: No tooth fracture. No periapical lucencies.    Sinuses: There are no fluid levels in the sinuses.    Imaged mastoids and middle ears: Partial opacification of the dependent right mastoid air cells, possibly on the basis of mucosal thickening and or effusion.    Imaged upper cervical spine: See below.    Facial soft tissues: See above.    Orbits: The bony orbits and orbital contents are atraumatic.    Imaged intracranial structures and upper aerodigestive tract: Unremarkable.    Cervical spine:    Comment: Motion compromised examination.    Fractures: No acute fractures    Alignment: Straightening of anticipated cervical lordosis.  Grade 1 anterolisthesis of C4 on C5.  Atlanto-axial and atlanto-occipital joints: Atlanto-axial and atlanto-occipital intervals are not widened.  Facet joints: There is no traumatic facet joint widening.Degenerative facet arthropathy  Vertebral bodies: Heterogeneous marrow attenuation.  Appearance relatively similar to osseous structures seen on abdomen/pelvis CT 04/26/2023 and chest, abdomen, and pelvis CT 03/13/2023.  Discs: Degenerative disc osteophyte complex formation.  Spinal canal and foraminal narrowing: Although CT  does not optimally evaluate the soft tissue contents of the spinal canal and foramina, no critical stenosis is suggested.    At C5-6, suspect moderate left foraminal narrowing      Paraspinal soft tissues: Extensive cervical and supraclavicular adenopathy.    Upper Lungs:Clear                                       CT Cervical Spine Without Contrast (Final result)  Result time 06/16/23 11:06:29      Final result by Kevin Miller MD (06/16/23 11:06:29)                   Impression:      1. No acute intracranial findings.  2. Recent appearing mild displaced left nasal bone fracture.  No additional recent appearing for facial bone fractures identified.  3. No acute cervical spine fracture.  4. Extensive cervical and supraclavicular adenopathy.  Continued attention on follow-up recommended.  5. Additional details, as provided in the body of report.      Electronically signed by: Kevin Miller  Date:    06/16/2023  Time:    11:06               Narrative:    EXAMINATION:  CT HEAD WITHOUT CONTRAST; CT MAXILLOFACIAL WITHOUT CONTRAST; CT CERVICAL SPINE WITHOUT CONTRAST    CLINICAL HISTORY:  Head trauma, minor (Age >= 65y);; Facial trauma, blunt;; Neck trauma (Age >= 65y);    TECHNIQUE:  Low dose axial images were obtained through the head, cervical spine, and facial bones.  Coronal and sagittal reformations were also performed. Contrast was not administered.    COMPARISON:  CT head 05/15/2023.    FINDINGS:  Head:    Blood: No acute intracranial hemorrhage.    Parenchyma: No definite loss of gray-white differentiation to suggest acute or subacute transcortical infarct. Remote lacunar infarcts versus prominent perivascular spaces inferior left basal ganglia, as before.  Generalized pattern age-related parenchymal volume loss.  Nonspecific areas of white matter hypoattenuation may relate to sequela of chronic small vessel ischemic disease.    Ventricles/Extra-axial spaces: No abnormal extra-axial fluid collection. Basal  cisterns are patent.    Vessels: Mild-to-moderate atherosclerotic calcification.    Orbits: See below.    Scalp: Unremarkable.    Skull: There are no depressed skull fractures or destructive bone lesions.    Sinuses and mastoids: See below.    Other findings: None    Facial bones:    Acute facial fractures: Recent appearing mild displaced nasal bone fracture, with mild leftward deviation of the nasal arch, new since head CT 04/01/2023.  Overlying soft tissue swelling is noted without definite radiopaque foreign body.  Nasal septum appears intact.  Nasolacrimal ducts appear uninvolved.    No definite additional recent appearing facial bone fracture seen.    Pterygoid plates, Zygomatic arches, Sphenotemporal buttresses: Intact.    Nasal Bones: As above.    Mandible: The mandible is intact.  Leftward asymmetric TMJ DJD.    Dentition: No tooth fracture. No periapical lucencies.    Sinuses: There are no fluid levels in the sinuses.    Imaged mastoids and middle ears: Partial opacification of the dependent right mastoid air cells, possibly on the basis of mucosal thickening and or effusion.    Imaged upper cervical spine: See below.    Facial soft tissues: See above.    Orbits: The bony orbits and orbital contents are atraumatic.    Imaged intracranial structures and upper aerodigestive tract: Unremarkable.    Cervical spine:    Comment: Motion compromised examination.    Fractures: No acute fractures    Alignment: Straightening of anticipated cervical lordosis.  Grade 1 anterolisthesis of C4 on C5.  Atlanto-axial and atlanto-occipital joints: Atlanto-axial and atlanto-occipital intervals are not widened.  Facet joints: There is no traumatic facet joint widening.Degenerative facet arthropathy  Vertebral bodies: Heterogeneous marrow attenuation.  Appearance relatively similar to osseous structures seen on abdomen/pelvis CT 04/26/2023 and chest, abdomen, and pelvis CT 03/13/2023.  Discs: Degenerative disc osteophyte complex  formation.  Spinal canal and foraminal narrowing: Although CT does not optimally evaluate the soft tissue contents of the spinal canal and foramina, no critical stenosis is suggested.    At C5-6, suspect moderate left foraminal narrowing      Paraspinal soft tissues: Extensive cervical and supraclavicular adenopathy.    Upper Lungs:Clear                                    X-Rays:   Independently Interpreted Readings:   Other Readings:  No rib fractures, consolidation,, pneumothorax, or pleural effusion noted on CXR      Medications   diazePAM injection 10 mg (10 mg Intravenous Given 6/16/23 1155)   LORazepam injection 1 mg (1 mg Intravenous Given 6/16/23 1300)     Medical Decision Making:   History:   Old Medical Records: I decided to obtain old medical records.  Initial Assessment:   Emergent evaluation 63-year-old female presenting to the ED after a fall after drinking copious amounts of alcohol.  Endorsed facial pain, has abrasion noted below right eye.    Differential includes is not limited to intracranial hemorrhage, C-spine fracture, intrathoracic or intra-abdominal trauma, muscular injury.    CT head, maxillofacial, C-spine for trauma workup.  Did not obtain a chest or abdomen pelvis as patient denied any pain, has no significant tenderness to palpation with clear lung sounds.  CT head showed no concern for intracranial hemorrhage.  CT maxillofacial showed previous nasal fracture, no acute fractures.  CT C-spine showed no concern for acute fractures.  While in the emergency department, patient endorsed SI to nurse.  Obtain blood work and PEC'd patient due to danger to self.  CBC showed leukocytosis which I suspect is reactionary secondary to patient having no symptoms for infection.  No significant anemia seen on CBC. CMP showed no electrolyte abnormalities concerning for hospitalization. UA showed no concern for UTI. UDS negative.  Ethanol level elevated at 215.  This was drawn approximately 3 hours after  patient arrived due to lab not receiving initial blood work.  TSH within normal limits.   Patient started developing tremors, concern for alcohol withdrawal.  Given 10 mg Valium.  Subsequently given 1 mg Ativan. Discussed with Hospital Medicine for admission for alcohol withdrawal.  Independently Interpreted Test(s):   I have ordered and independently interpreted X-rays - see prior notes.  Clinical Tests:   Lab Tests: Ordered and Reviewed  Radiological Study: Ordered and Reviewed                        Clinical Impression:   Final diagnoses:  [R06.02] SOB (shortness of breath)  [F10.939] Alcohol withdrawal        ED Disposition Condition    Observation                 Den Trinidad MD  Resident  06/16/23 [id number]

## 2023-06-16 NOTE — Clinical Note
Diagnosis: Alcohol withdrawal [291.81.ICD-9-CM]   Admitting Provider:: AKSHAT CABALLERO [[id number]]   Future Attending Provider: AKSHAT CABALLERO [[id number]]   Reason for IP Medical Treatment  (Clinical interventions that can only be accomplished in the IP setting? ) :: alcohol withdrawal, SI   I certify that Inpatient services for greater than or equal to 2 midnights are medically necessary:: Yes   Plans for Post-Acute care--if anticipated (pick the single best option):: A. No post acute care anticipated at this time

## 2023-06-16 NOTE — HPI
Ms. Abdul is a 63 year old woman with PMH EtOH use disorder with history of prior seizures, multiple admissions for alcohol withdrawal, depression with suicide attempt in 2017, breast cancer, HTN, HLD, fibromyalgia, sarcoidosis, hypothyroidism, T2DM, CLL (not on treatment) and COPD who presented to [facility] with chief complaint of multiple falls.  Patient drank a lot of alcohol today, was walking around and lost her balance and fell. She reports that he did hit her head and loss consciousness. She endorsed facial pain and notes abrasion below right eye. She denies chest pain, abdominal pain, shortness breath, urinary symptoms, vomiting, numbness or weakness in her arms or legs. She drinks about a bottle of vodka a day (750 ml).    In the ED, she was afebrile, tachycardic to 108, tachypneic with RR 22, and O2 sat at 88% and was placed on 2 L NC. She was alert and oriented x3 and denied hallucinations. CT head showed no concern for intracranial hemorrhage. CT maxillofacial showed previous nasal fracture, no acute fractures. CT C-spine showed no concern for acute fractures. While in the ED, she later endorsed SI and she was PEC'd.     CBC showed WBC 17, CMP showed no electrolyte abnormalities and UA showed no concern for UTI. UDS positive for benzodiazepines. Ethanol level elevated at 215. TSH within normal limits. Patient started developing tremors, concern for alcohol withdrawal and was given a dose of valium 10 mg and subsequently given 1 mg Ativan. Addiction psychiatry consulted and patient was started on CIWA protocol. She was admitted to Hospital Medicine service for further evaluation and management of alcohol withdrawal.

## 2023-06-16 NOTE — ED NOTES
"Crying, saying "I want to die"  "I don't want to live anymore"  asked pt if she has had these thoughts prior to today and pt said yes.   No plan, but states "I don't know how to"  notified treatment team  "

## 2023-06-17 LAB
ANION GAP SERPL CALC-SCNC: 12 MMOL/L (ref 8–16)
BASOPHILS # BLD AUTO: 0.01 K/UL (ref 0–0.2)
BASOPHILS NFR BLD: 0.2 % (ref 0–1.9)
BUN SERPL-MCNC: 6 MG/DL (ref 8–23)
CALCIUM SERPL-MCNC: 8.2 MG/DL (ref 8.7–10.5)
CHLORIDE SERPL-SCNC: 103 MMOL/L (ref 95–110)
CHLORIDE UR-SCNC: 41 MMOL/L (ref 25–200)
CO2 SERPL-SCNC: 27 MMOL/L (ref 23–29)
CREAT SERPL-MCNC: 0.6 MG/DL (ref 0.5–1.4)
DIFFERENTIAL METHOD: ABNORMAL
EOSINOPHIL # BLD AUTO: 0.1 K/UL (ref 0–0.5)
EOSINOPHIL NFR BLD: 1.1 % (ref 0–8)
ERYTHROCYTE [DISTWIDTH] IN BLOOD BY AUTOMATED COUNT: 16.8 % (ref 11.5–14.5)
EST. GFR  (NO RACE VARIABLE): >60 ML/MIN/1.73 M^2
GLUCOSE SERPL-MCNC: 82 MG/DL (ref 70–110)
HCT VFR BLD AUTO: 30.6 % (ref 37–48.5)
HGB BLD-MCNC: 9.8 G/DL (ref 12–16)
IMM GRANULOCYTES # BLD AUTO: 0.01 K/UL (ref 0–0.04)
IMM GRANULOCYTES NFR BLD AUTO: 0.2 % (ref 0–0.5)
LYMPHOCYTES # BLD AUTO: 3.1 K/UL (ref 1–4.8)
LYMPHOCYTES NFR BLD: 66.5 % (ref 18–48)
MAGNESIUM SERPL-MCNC: 1.3 MG/DL (ref 1.6–2.6)
MCH RBC QN AUTO: 28.7 PG (ref 27–31)
MCHC RBC AUTO-ENTMCNC: 32 G/DL (ref 32–36)
MCV RBC AUTO: 90 FL (ref 82–98)
MONOCYTES # BLD AUTO: 0.3 K/UL (ref 0.3–1)
MONOCYTES NFR BLD: 6.4 % (ref 4–15)
NEUTROPHILS # BLD AUTO: 1.2 K/UL (ref 1.8–7.7)
NEUTROPHILS NFR BLD: 25.6 % (ref 38–73)
NRBC BLD-RTO: 0 /100 WBC
OSMOLALITY UR: 193 MOSM/KG (ref 50–1200)
PHOSPHATE SERPL-MCNC: 3.2 MG/DL (ref 2.7–4.5)
PLATELET # BLD AUTO: 74 K/UL (ref 150–450)
PMV BLD AUTO: 10.8 FL (ref 9.2–12.9)
POCT GLUCOSE: 142 MG/DL (ref 70–110)
POCT GLUCOSE: 150 MG/DL (ref 70–110)
POTASSIUM SERPL-SCNC: 3.1 MMOL/L (ref 3.5–5.1)
RBC # BLD AUTO: 3.42 M/UL (ref 4–5.4)
SODIUM SERPL-SCNC: 142 MMOL/L (ref 136–145)
SODIUM UR-SCNC: 37 MMOL/L (ref 20–250)
WBC # BLD AUTO: 4.66 K/UL (ref 3.9–12.7)

## 2023-06-17 PROCEDURE — 90792 PSYCH DIAG EVAL W/MED SRVCS: CPT | Mod: ,,, | Performed by: PSYCHIATRY & NEUROLOGY

## 2023-06-17 PROCEDURE — 96376 TX/PRO/DX INJ SAME DRUG ADON: CPT

## 2023-06-17 PROCEDURE — 99900035 HC TECH TIME PER 15 MIN (STAT)

## 2023-06-17 PROCEDURE — 80048 BASIC METABOLIC PNL TOTAL CA: CPT | Performed by: INTERNAL MEDICINE

## 2023-06-17 PROCEDURE — 63600175 PHARM REV CODE 636 W HCPCS: Performed by: INTERNAL MEDICINE

## 2023-06-17 PROCEDURE — 96366 THER/PROPH/DIAG IV INF ADDON: CPT

## 2023-06-17 PROCEDURE — 96372 THER/PROPH/DIAG INJ SC/IM: CPT | Performed by: INTERNAL MEDICINE

## 2023-06-17 PROCEDURE — G0378 HOSPITAL OBSERVATION PER HR: HCPCS

## 2023-06-17 PROCEDURE — 96365 THER/PROPH/DIAG IV INF INIT: CPT

## 2023-06-17 PROCEDURE — 99233 SBSQ HOSP IP/OBS HIGH 50: CPT | Mod: ,,, | Performed by: INTERNAL MEDICINE

## 2023-06-17 PROCEDURE — 84100 ASSAY OF PHOSPHORUS: CPT | Performed by: INTERNAL MEDICINE

## 2023-06-17 PROCEDURE — 82436 ASSAY OF URINE CHLORIDE: CPT | Performed by: INTERNAL MEDICINE

## 2023-06-17 PROCEDURE — 90792 PR PSYCHIATRIC DIAGNOSTIC EVALUATION W/MEDICAL SERVICES: ICD-10-PCS | Mod: ,,, | Performed by: PSYCHIATRY & NEUROLOGY

## 2023-06-17 PROCEDURE — 83935 ASSAY OF URINE OSMOLALITY: CPT | Performed by: INTERNAL MEDICINE

## 2023-06-17 PROCEDURE — 96361 HYDRATE IV INFUSION ADD-ON: CPT

## 2023-06-17 PROCEDURE — 94761 N-INVAS EAR/PLS OXIMETRY MLT: CPT

## 2023-06-17 PROCEDURE — 25000242 PHARM REV CODE 250 ALT 637 W/ HCPCS: Performed by: INTERNAL MEDICINE

## 2023-06-17 PROCEDURE — 84300 ASSAY OF URINE SODIUM: CPT | Performed by: INTERNAL MEDICINE

## 2023-06-17 PROCEDURE — 36415 COLL VENOUS BLD VENIPUNCTURE: CPT | Performed by: INTERNAL MEDICINE

## 2023-06-17 PROCEDURE — 85025 COMPLETE CBC W/AUTO DIFF WBC: CPT | Performed by: INTERNAL MEDICINE

## 2023-06-17 PROCEDURE — 83735 ASSAY OF MAGNESIUM: CPT | Performed by: INTERNAL MEDICINE

## 2023-06-17 PROCEDURE — 99233 PR SUBSEQUENT HOSPITAL CARE,LEVL III: ICD-10-PCS | Mod: ,,, | Performed by: INTERNAL MEDICINE

## 2023-06-17 PROCEDURE — 97165 OT EVAL LOW COMPLEX 30 MIN: CPT

## 2023-06-17 PROCEDURE — 25000003 PHARM REV CODE 250: Performed by: INTERNAL MEDICINE

## 2023-06-17 RX ORDER — DICYCLOMINE HYDROCHLORIDE 20 MG/1
20 TABLET ORAL 4 TIMES DAILY PRN
Status: DISCONTINUED | OUTPATIENT
Start: 2023-06-17 | End: 2023-06-22 | Stop reason: HOSPADM

## 2023-06-17 RX ORDER — MAGNESIUM SULFATE HEPTAHYDRATE 40 MG/ML
2 INJECTION, SOLUTION INTRAVENOUS ONCE
Status: COMPLETED | OUTPATIENT
Start: 2023-06-17 | End: 2023-06-17

## 2023-06-17 RX ORDER — SODIUM CHLORIDE 9 MG/ML
INJECTION, SOLUTION INTRAVENOUS CONTINUOUS
Status: ACTIVE | OUTPATIENT
Start: 2023-06-17 | End: 2023-06-17

## 2023-06-17 RX ORDER — MUPIROCIN 20 MG/G
OINTMENT TOPICAL 2 TIMES DAILY
Status: DISCONTINUED | OUTPATIENT
Start: 2023-06-17 | End: 2023-06-22 | Stop reason: HOSPADM

## 2023-06-17 RX ORDER — POTASSIUM CHLORIDE 750 MG/1
30 CAPSULE, EXTENDED RELEASE ORAL 3 TIMES DAILY
Status: COMPLETED | OUTPATIENT
Start: 2023-06-17 | End: 2023-06-17

## 2023-06-17 RX ADMIN — LEVOTHYROXINE SODIUM 50 MCG: 50 TABLET ORAL at 05:06

## 2023-06-17 RX ADMIN — FLUTICASONE FUROATE AND VILANTEROL TRIFENATATE 1 PUFF: 100; 25 POWDER RESPIRATORY (INHALATION) at 10:06

## 2023-06-17 RX ADMIN — SODIUM CHLORIDE: 9 INJECTION, SOLUTION INTRAVENOUS at 12:06

## 2023-06-17 RX ADMIN — HEPARIN SODIUM 5000 UNITS: 5000 INJECTION INTRAVENOUS; SUBCUTANEOUS at 09:06

## 2023-06-17 RX ADMIN — HEPARIN SODIUM 5000 UNITS: 5000 INJECTION INTRAVENOUS; SUBCUTANEOUS at 01:06

## 2023-06-17 RX ADMIN — MUPIROCIN: 20 OINTMENT TOPICAL at 05:06

## 2023-06-17 RX ADMIN — SODIUM CHLORIDE: 9 INJECTION, SOLUTION INTRAVENOUS at 05:06

## 2023-06-17 RX ADMIN — TRAZODONE HYDROCHLORIDE 300 MG: 100 TABLET ORAL at 09:06

## 2023-06-17 RX ADMIN — LORAZEPAM 2 MG: 2 INJECTION INTRAMUSCULAR; INTRAVENOUS at 11:06

## 2023-06-17 RX ADMIN — FOLIC ACID 1 MG: 1 TABLET ORAL at 08:06

## 2023-06-17 RX ADMIN — DIAZEPAM 10 MG: 5 TABLET ORAL at 01:06

## 2023-06-17 RX ADMIN — DIAZEPAM 10 MG: 5 TABLET ORAL at 05:06

## 2023-06-17 RX ADMIN — PANTOPRAZOLE SODIUM 40 MG: 40 TABLET, DELAYED RELEASE ORAL at 08:06

## 2023-06-17 RX ADMIN — POTASSIUM CHLORIDE 30 MEQ: 10 CAPSULE, COATED, EXTENDED RELEASE ORAL at 08:06

## 2023-06-17 RX ADMIN — POTASSIUM CHLORIDE 30 MEQ: 10 CAPSULE, COATED, EXTENDED RELEASE ORAL at 06:06

## 2023-06-17 RX ADMIN — HEPARIN SODIUM 5000 UNITS: 5000 INJECTION INTRAVENOUS; SUBCUTANEOUS at 05:06

## 2023-06-17 RX ADMIN — DIAZEPAM 10 MG: 5 TABLET ORAL at 09:06

## 2023-06-17 RX ADMIN — LISINOPRIL 20 MG: 20 TABLET ORAL at 08:06

## 2023-06-17 RX ADMIN — ACETAMINOPHEN 650 MG: 325 TABLET ORAL at 01:06

## 2023-06-17 RX ADMIN — Medication 100 MG: at 08:06

## 2023-06-17 RX ADMIN — MAGNESIUM SULFATE 2 G: 2 INJECTION INTRAVENOUS at 07:06

## 2023-06-17 RX ADMIN — DULOXETINE HYDROCHLORIDE 90 MG: 30 CAPSULE, DELAYED RELEASE ORAL at 08:06

## 2023-06-17 RX ADMIN — LORAZEPAM 2 MG: 2 INJECTION INTRAMUSCULAR; INTRAVENOUS at 08:06

## 2023-06-17 RX ADMIN — THERA TABS 1 TABLET: TAB at 08:06

## 2023-06-17 RX ADMIN — NALTREXONE HYDROCHLORIDE 50 MG: 50 TABLET, FILM COATED ORAL at 08:06

## 2023-06-17 NOTE — ASSESSMENT & PLAN NOTE
Patient has persistent depression which is moderate and is currently uncontrolled. Will Continue anti-depressant medications. We will consult psychiatry at this time. Patient does not display psychosis at this time. Continue to monitor closely and adjust plan of care as needed.    - continue home cymbalta and trazodone  - psychiatry consulted. apprec recs

## 2023-06-17 NOTE — ASSESSMENT & PLAN NOTE
- T1b N0 stage IA breast cancer diagnosed October 2020.   - S/p bilateral mastectomy with no adjuvant therapy; received Letrozole February 2021-may 2021

## 2023-06-17 NOTE — ASSESSMENT & PLAN NOTE
- Established with Dr. Gonzalez. Not currently on treatment.   - Chronic thrombocytopenia noted.  Possibly associated with alcohol use.   - Outpatient Heme-Onc follow up

## 2023-06-17 NOTE — PLAN OF CARE
Problem: Violence Risk or Actual  Goal: Anger and Impulse Control  Outcome: Ongoing, Progressing     Problem: Adult Inpatient Plan of Care  Goal: Plan of Care Review  Outcome: Ongoing, Progressing     Problem: Adult Inpatient Plan of Care  Goal: Absence of Hospital-Acquired Illness or Injury  Outcome: Ongoing, Progressing

## 2023-06-17 NOTE — SUBJECTIVE & OBJECTIVE
Interval History: Patient lying in bed, no acute distress. No acute events overnight. Slight improvement in mental status today. Lethargic and oriented x2. Patient reports that tremors have still improved since yesterday. Patient also notes good UOP, regular bowel movements and decreased po intake. Tolerating valium 10 mg q8. She required once prn IV ativan overnight for CIWA of 14 but since this morning, CIWA was found to be 7. Addiction psychiatry consulted and will followup the recs. Patient remains PEC'd for now.    Review of Systems   Unable to perform ROS: Mental status change   Objective:     Vital Signs (Most Recent):  Temp: 98.1 °F (36.7 °C) (06/17/23 1025)  Pulse: 91 (06/17/23 1025)  Resp: 19 (06/17/23 1025)  BP: (!) 175/81 (06/17/23 1025)  SpO2: (!) 93 % (06/17/23 1025) Vital Signs (24h Range):  Temp:  [98.1 °F (36.7 °C)-99.1 °F (37.3 °C)] 98.1 °F (36.7 °C)  Pulse:  [] 91  Resp:  [17-19] 19  SpO2:  [93 %-95 %] 93 %  BP: (146-175)/(67-81) 175/81     Weight: 73.9 kg (163 lb)  Body mass index is 26.31 kg/m².  No intake or output data in the 24 hours ending 06/17/23 1155      Physical Exam  Constitutional:       Appearance: She is well-developed. She is ill-appearing.   HENT:      Head: Normocephalic and atraumatic.   Eyes:      General: No scleral icterus.     Extraocular Movements: Extraocular movements intact.      Pupils: Pupils are equal, round, and reactive to light.   Neck:      Vascular: No JVD.   Cardiovascular:      Rate and Rhythm: Normal rate and regular rhythm.      Heart sounds: No murmur heard.    No friction rub. No gallop.   Pulmonary:      Effort: Pulmonary effort is normal. No respiratory distress.      Breath sounds: Normal breath sounds. No wheezing.   Abdominal:      General: Bowel sounds are normal. There is no distension.      Palpations: Abdomen is soft.      Tenderness: There is no abdominal tenderness.   Musculoskeletal:         General: No deformity. Normal range of motion.       Cervical back: Neck supple.   Lymphadenopathy:      Cervical: No cervical adenopathy.   Skin:     General: Skin is warm and dry.      Capillary Refill: Capillary refill takes less than 2 seconds.      Findings: No erythema or rash.   Neurological:      Mental Status: She is lethargic and disoriented.      Cranial Nerves: No cranial nerve deficit.      Sensory: No sensory deficit.   Psychiatric:         Attention and Perception: She is inattentive.         Cognition and Memory: Cognition is impaired. Memory is impaired.           Significant Labs: A1C:   Recent Labs   Lab 12/23/22  0511 03/26/23  1656 06/16/23  1817   HGBA1C 4.6 5.1 4.7     Blood Culture: No results for input(s): LABBLOO in the last 48 hours.  CBC:   Recent Labs   Lab 06/16/23  0918 06/17/23  0407   WBC 17.74* 4.66   HGB 12.0 9.8*   HCT 38.7 30.6*   * 74*     CMP:   Recent Labs   Lab 06/16/23  0918 06/17/23  0407   * 142   K 3.9 3.1*    103   CO2 27 27   GLU 76 82   BUN 6* 6*   CREATININE 0.7 0.6   CALCIUM 9.1 8.2*   PROT 7.1  --    ALBUMIN 4.2  --    BILITOT 0.4  --    ALKPHOS 48*  --    AST 29  --    ALT 14  --    ANIONGAP 14 12     Coagulation:   Recent Labs   Lab 06/16/23  0918   INR 1.0     Lactic Acid: No results for input(s): LACTATE in the last 48 hours.  Magnesium:   Recent Labs   Lab 06/17/23  0407   MG 1.3*     POCT Glucose:   Recent Labs   Lab 06/16/23  1228 06/16/23  2041   POCTGLUCOSE 77 75     Troponin: No results for input(s): TROPONINI, TROPONINIHS in the last 48 hours.  TSH:   Recent Labs   Lab 06/16/23  0918   TSH 1.283     Urine Studies:   Recent Labs   Lab 06/16/23  1220   COLORU Yellow   APPEARANCEUA Clear   PHUR 6.0   SPECGRAV 1.010   PROTEINUA Negative   GLUCUA Negative   KETONESU Negative   BILIRUBINUA Negative   OCCULTUA Negative   NITRITE Negative   LEUKOCYTESUR Negative       Significant Imaging: I have reviewed all pertinent imaging results/findings within the past 24 hours.

## 2023-06-17 NOTE — H&P
[facility] Medicine  History & Physical    Patient Name: Earl Abdul  MRN: [id number]  Patient Class: OP- Observation  Admission Date: 6/16/2023  Attending Physician: Andrei Sosa MD   Primary Care Provider: Andrew Rodriguez MD         Patient information was obtained from patient, relative(s) and ER records.     Subjective:     Principal Problem:Alcohol withdrawal syndrome with complication    Chief Complaint:   Chief Complaint   Patient presents with    Fall     Per family, drinking heavily for the last week or so, recent falls. Most recent this morning, + head trauma, R eye bruising, - AC. PT states she drinks a bottle of vodka a day (750 ml)        HPI: Ms. Abdul is a 63 year old woman with PMH EtOH use disorder with history of prior seizures, multiple admissions for alcohol withdrawal, depression with suicide attempt in 2017, breast cancer, HTN, HLD, fibromyalgia, sarcoidosis, hypothyroidism, T2DM, CLL (not on treatment) and COPD who presented to [facility] with chief complaint of multiple falls.  Patient drank a lot of alcohol today, was walking around and lost her balance and fell. She reports that he did hit her head and loss consciousness. She endorsed facial pain and notes abrasion below right eye. She denies chest pain, abdominal pain, shortness breath, urinary symptoms, vomiting, numbness or weakness in her arms or legs. She drinks about a bottle of vodka a day (750 ml).    In the ED, she was afebrile, tachycardic to 108, tachypneic with RR 22, and O2 sat at 88% and was placed on 2 L NC. She was alert and oriented x3 and denied hallucinations. CT head showed no concern for intracranial hemorrhage. CT maxillofacial showed previous nasal fracture, no acute fractures. CT C-spine showed no concern for acute fractures. While in the ED, she later endorsed SI and she was PEC'd.     CBC showed WBC 17, CMP showed no electrolyte abnormalities and UA showed no concern for UTI. UDS  positive for benzodiazepines. Ethanol level elevated at 215. TSH within normal limits. Patient started developing tremors, concern for alcohol withdrawal and was given a dose of valium 10 mg and subsequently given 1 mg Ativan. Addiction psychiatry consulted and patient was started on CIWA protocol. She was admitted to Hospital Medicine service for further evaluation and management of alcohol withdrawal.       Past Medical History:   Diagnosis Date    Anemia     Anemia     Controlled type 2 diabetes mellitus without complication, without long-term current use of insulin 11/30/2021    COPD (chronic obstructive pulmonary disease)     Depression     Diverticulitis     Fatty liver     GERD (gastroesophageal reflux disease)     Hyperlipidemia     Hypertension     Pancreatitis     Peptic ulcer disease     Polysubstance abuse     Posterior reversible encephalopathy syndrome     Sarcoidosis of lung     Sarcoidosis of lung     over 30 yrs ago    Seizures     7/2017    Suicide attempt     Suicide ideation        Past Surgical History:   Procedure Laterality Date    APPENDECTOMY      BILATERAL MASTECTOMY Bilateral 10/29/2020    Procedure: MASTECTOMY, BILATERAL;  Surgeon: Baylee Kevin MD;  Location: [facility] OR [address] [place] [address];  Service: General;  Laterality: Bilateral;    BREAST REVISION SURGERY Bilateral 2/11/2021    Procedure: BREAST REVISION SURGERY;  Surgeon: Scottie Johnson MD;  Location: [facility] OR [address] [place] [address];  Service: Plastics;  Laterality: Bilateral;    COLONOSCOPY N/A 7/28/2017    Procedure: COLONOSCOPY;  Surgeon: Aaron Alvarado MD;  Location: [facility];  Service: Endoscopy;  Laterality: N/A;    ESOPHAGOGASTRODUODENOSCOPY  10/7/2016, 11/6/2014 2016 - gastritis, duodenitis, 2014 erosive gastritis    ESOPHAGOGASTRODUODENOSCOPY N/A 2/11/2020    Procedure: ESOPHAGOGASTRODUODENOSCOPY (EGD);  Surgeon: Fawn Garrido MD;  Location: [facility];  Service: Endoscopy;  Laterality: N/A;     ESOPHAGOGASTRODUODENOSCOPY N/A 4/19/2021    Procedure: EGD (ESOPHAGOGASTRODUODENOSCOPY);  Surgeon: Paramjit Martino MD;  [facility];  Service: Endoscopy;  Laterality: N/A;    FLEXIBLE SIGMOIDOSCOPY  11/06/2014    colitis    HYSTERECTOMY      IMPLANTATION OF PERMANENT SACRAL NERVE STIMULATOR N/A 7/12/2022    Procedure: INSERTION, NEUROSTIMULATOR, PERMANENT, SACRAL;  Surgeon: Juaquin Edwards MD;  [facility];  Service: Urology;  Laterality: N/A;  1hr    INJECTION FOR SENTINEL NODE IDENTIFICATION Right 10/29/2020    Procedure: INJECTION, FOR SENTINEL NODE IDENTIFICATION;  Surgeon: Baylee Kevin MD;  Location: [facility] OR [address] [place] [address];  Service: General;  Laterality: Right;    INJECTION OF JOINT Right 10/10/2019    Procedure: Injection, Joint RIGHT ILIOPSOAS BURSA/TENDON INJECTION AND RIGHT GLUTEAL TENDON INJECTION WITH STEROID AND LIDOCAINE;  Surgeon: Guillaume Rico MD;  Location: [facility] PAIN [facility]GT;  Service: Pain Management;  Laterality: Right;  NEEDS CONSENT    INSERTION OF BREAST TISSUE EXPANDER Bilateral 10/29/2020    Procedure: INSERTION, TISSUE EXPANDER, BREAST;  Surgeon: Scottie Johnson MD;  Location: [address];  Service: Plastics;  Laterality: Bilateral;  Right breast: 1082 g  Left breast: 1076 g    LIPOSUCTION Bilateral 2/11/2021    Procedure: LIPOSUCTION;  Surgeon: Scottie Johnson MD;  Location: [address];  Service: Plastics;  Laterality: Bilateral;    mediastenoscopy      REPLACEMENT OF IMPLANT OF BREAST Bilateral 2/11/2021    Procedure: REPLACEMENT, IMPLANT, BREAST;  Surgeon: Scottie Johnson MD;  Location: [facility] OR [address] [place] [address];  Service: Plastics;  Laterality: Bilateral;    SENTINEL LYMPH NODE BIOPSY Right 10/29/2020    Procedure: BIOPSY, LYMPH NODE, SENTINEL;  Surgeon: Baylee Kevin MD;  Location: [facility] OR [address] [place] [address];  Service: General;  Laterality: Right;    TONSILLECTOMY N/A 1970    TUBAL LIGATION         Review of patient's allergies indicates:    Allergen Reactions    Lortab [hydrocodone-acetaminophen] Itching    Promethazine Itching and Other (See Comments)       Current Facility-Administered Medications on File Prior to Encounter   Medication    albuterol sulfate nebulizer solution 2.5 mg     Current Outpatient Medications on File Prior to Encounter   Medication Sig    acetaminophen (TYLENOL) 500 MG tablet Take 500-1,500 mg by mouth daily as needed for Pain.    albuterol (PROVENTIL/VENTOLIN HFA) 90 mcg/actuation inhaler     chlordiazepoxide (LIBRIUM) 25 MG Cap Take 1 capsule (25 mg total) by mouth 2 (two) times daily as needed for Anxiety.    dicyclomine (BENTYL) 20 mg tablet Take 20 mg by mouth 4 (four) times daily.    DULoxetine (CYMBALTA) 30 MG capsule Take 3 capsules (90 mg total) by mouth once daily.    DULoxetine (CYMBALTA) 60 MG capsule Take 60 mg by mouth.    fluticasone furoate-vilanteroL (BREO ELLIPTA) 100-25 mcg/dose diskus inhaler Inhale 1 puff into the lungs once daily. Controller    folic acid (FOLVITE) 1 MG tablet Take 1 tablet (1 mg total) by mouth once daily.    gabapentin (NEURONTIN) 600 MG tablet Take 600 mg by mouth 3 (three) times daily.    levothyroxine (SYNTHROID) 50 MCG tablet Take 1 tablet (50 mcg total) by mouth before breakfast.    lisinopriL (PRINIVIL,ZESTRIL) 20 MG tablet Take 1 tablet (20 mg total) by mouth once daily.    loperamide (IMODIUM) 2 mg capsule Take 2 mg by mouth 4 (four) times daily as needed for Diarrhea (Per package directions).    melatonin (MELATIN) Take by mouth nightly as needed for Insomnia.    metFORMIN (GLUCOPHAGE-XR) 500 MG ER 24hr tablet Take 2 tablets (1,000 mg total) by mouth 2 (two) times daily with meals.    multivitamin Tab Take 1 tablet by mouth once daily.    naltrexone (DEPADE) 50 mg tablet Take 1 tablet (50 mg) by mouth once daily.    ondansetron (ZOFRAN-ODT) 4 MG TbDL Take 1 tablet (4 mg total) by mouth every 6 (six) hours as needed (nausea).    pantoprazole (PROTONIX) 40  MG tablet Take 40 mg by mouth once daily.    thiamine 100 MG tablet Take 1 tablet (100 mg total) by mouth once daily.    traZODone (DESYREL) 300 MG tablet TAKE 1 TABLET BY MOUTH AT BEDTIME     Family History       Problem Relation (Age of Onset)    Breast cancer Maternal Aunt, Daughter    Colon cancer Maternal Uncle    Diabetes Father, Mother    Heart attack Father    Hypertension Father, Mother          Tobacco Use    Smoking status: Former     Packs/day: 0.50     Years: 30.00     Pack years: 15.00     Types: Vaping with nicotine, Cigarettes     Quit date: 2021     Years since quittin.3    Smokeless tobacco: Never   Substance and Sexual Activity    Alcohol use: Yes     Comment: vodka daily (half a regular bottle)    Drug use: Yes     Types: Marijuana     Comment: gummies    Sexual activity: Yes     Birth control/protection: Surgical     Review of Systems   Constitutional:  Positive for activity change, appetite change and fatigue. Negative for chills and fever.   HENT:  Negative for congestion and trouble swallowing.    Eyes:  Negative for visual disturbance.   Respiratory:  Positive for shortness of breath. Negative for cough.    Cardiovascular:  Negative for chest pain and leg swelling.   Gastrointestinal:  Negative for abdominal distention, abdominal pain, nausea and vomiting.   Endocrine: Negative for cold intolerance, heat intolerance, polydipsia and polyuria.   Genitourinary:  Negative for difficulty urinating and dysuria.   Musculoskeletal:  Positive for myalgias. Negative for back pain.   Skin:  Negative for rash and wound.   Neurological:  Positive for tremors and weakness. Negative for dizziness and light-headedness.   Hematological:  Negative for adenopathy. Does not bruise/bleed easily.   Psychiatric/Behavioral:  Positive for decreased concentration, sleep disturbance and suicidal ideas. Negative for confusion and hallucinations. The patient is nervous/anxious.      Objective:     Vital  Signs (Most Recent):  Temp: 98.5 °F (36.9 °C) (06/16/23 0752)  Pulse: 108 (06/16/23 1535)  Resp: (!) 22 (06/16/23 0851)  BP: (!) 115/55 (06/16/23 1121)  SpO2: 97 % (06/16/23 1124) Vital Signs (24h Range):  Temp:  [98.5 °F (36.9 °C)] 98.5 °F (36.9 °C)  Pulse:  [] 108  Resp:  [16-22] 22  SpO2:  [88 %-97 %] 97 %  BP: (115-152)/(55-77) 115/55     Weight: 73.9 kg (163 lb)  Body mass index is 26.31 kg/m².     Physical Exam           Significant Labs: A1C:   Recent Labs   Lab 12/23/22  0511 03/26/23  1656   HGBA1C 4.6 5.1     CBC:   Recent Labs   Lab 06/16/23  0918   WBC 17.74*   HGB 12.0   HCT 38.7   *     CMP:   Recent Labs   Lab 06/16/23  0918   *   K 3.9      CO2 27   GLU 76   BUN 6*   CREATININE 0.7   CALCIUM 9.1   PROT 7.1   ALBUMIN 4.2   BILITOT 0.4   ALKPHOS 48*   AST 29   ALT 14   ANIONGAP 14     Coagulation:   Recent Labs   Lab 06/16/23  0918   INR 1.0     Magnesium: No results for input(s): MG in the last 48 hours.  POCT Glucose:   Recent Labs   Lab 06/16/23  1228   POCTGLUCOSE 77     Troponin: No results for input(s): TROPONINI, TROPONINIHS in the last 48 hours.  TSH:   Recent Labs   Lab 06/16/23  0918   TSH 1.283     Urine Studies:   Recent Labs   Lab 06/16/23  1220   COLORU Yellow   APPEARANCEUA Clear   PHUR 6.0   SPECGRAV 1.010   PROTEINUA Negative   GLUCUA Negative   KETONESU Negative   BILIRUBINUA Negative   OCCULTUA Negative   NITRITE Negative   LEUKOCYTESUR Negative       Significant Imaging: I have reviewed all pertinent imaging results/findings within the past 24 hours.    Assessment/Plan:     * Alcohol withdrawal syndrome with complication  - She drinks about a bottle of vodka a day (750 ml).  - last drink day of admission on 6/16  - alcohol level on admit, 215  - followup PETH  - on CIWA protocol   - continue valium 10 mg q8h and prn ativan   - continue folate, thiamine, MVI  - replete electrolytes prn  - addiction psychiatry consulted. followup recs    GERD (gastroesophageal  reflux disease)  - continue home protonix      Hypernatremia  - likely 2/2 hypovolemic hypernatremia  - completed NS infusion   - RESOLVED       Acute hypoxemic respiratory failure  Patient with Hypoxic Respiratory failure which is Acute.  she is not on home oxygen. Supplemental oxygen was provided and noted- Oxygen Concentration (%):  [28] 28    .   Signs/symptoms of respiratory failure include- tachypnea and lethargy. Contributing diagnoses includes - Aspiration, COPD and Pneumonia Labs and images were reviewed. Patient Has not had a recent ABG. Will treat underlying causes and adjust management of respiratory failure:    PLAN:  - not on oxygen at home   - O2 sat 88% on room air. On 2 L NC  - CXR nonacute   - followup TTE  - likely 2/2 tachypnea due to alcoholic ketosis   - wean O2 as tolerated     Migraine        CLL (chronic lymphocytic leukemia)  - Established with Dr. Gonzalez. Not currently on treatment.   - Chronic thrombocytopenia noted.  Possibly associated with alcohol use.   - Outpatient Heme-Onc follow up         Lymphocytosis        Type 2 diabetes mellitus with hyperglycemia  Patient's FSGs are controlled on current medication regimen.  Last A1c reviewed-   Lab Results   Component Value Date    HGBA1C 4.7 06/16/2023     Most recent fingerstick glucose reviewed-   Recent Labs   Lab 06/16/23  1228 06/16/23  2041   POCTGLUCOSE 77 75     Current correctional scale  Low  Maintain anti-hyperglycemic dose as follows-   Antihyperglycemics (From admission, onward)    Start     Stop Route Frequency Ordered    06/16/23 1658  insulin aspart U-100 pen 0-5 Units         -- SubQ Before meals & nightly PRN 06/16/23 1702        Hold Oral hypoglycemics while patient is in the hospital.  - Holding home metformin 1000mg BID      Anemia  - Hb on admit, 11  - baseline Hb 9-10  - likely hemoconcentration   - transfuse for Hb < 7  - CBC daily         COPD (chronic obstructive pulmonary disease)  - not on oxygen at home   -  "continue home Breo, combivent  - prn albuterol   - CXR negative   - wean O2 as tolerated   - O2 goal 8-92%         Malignant neoplasm of central portion of right breast in female, estrogen receptor positive  - T1b N0 stage IA breast cancer diagnosed October 2020.   - S/p bilateral mastectomy with no adjuvant therapy; received Letrozole February 2021-may 2021         VINICIO (obstructive sleep apnea)  - use CPAP nightly when AMS resolves       Suicidal ideation  - endorsed suicidal ideations on day of admission   - h/o SI, depression, anxiety and alcohol abuse   - PEC started on 6/16  - psychiatry consulted. followup recs      Hyperlipidemia  - not currently on a statin           History of sarcoidosis  - Patient with documented history of sarcoidosis.  Not currently on treatment.         Depression with anxiety  Patient has persistent depression which is moderate and is currently uncontrolled. Will Continue anti-depressant medications. We will consult psychiatry at this time. Patient does not display psychosis at this time. Continue to monitor closely and adjust plan of care as needed.    - continue home cymbalta and trazodone  - psychiatry consulted. apprec recs         History of substance abuse        Sarcoidosis  - Patient with documented history of sarcoidosis.  Not currently on treatment.      Fibromyalgia  - continue home cymbalta      Essential hypertension  - continue home lisiniopril    Alcohol use disorder, severe, dependence  - see "alcohol withdrawal"        VTE Risk Mitigation (From admission, onward)         Ordered     heparin (porcine) injection 5,000 Units  Every 8 hours         06/16/23 1702     IP VTE HIGH RISK PATIENT  Once         06/16/23 1702     Place sequential compression device  Until discontinued         06/16/23 1702                   On 06/17/2023, patient should be placed in hospital observation services under my care.    Time spent in care of patient: > 45 minutes       Andrei Sosa, " MD  Department of Hospital Medicine  [facility] includes the [facility]

## 2023-06-17 NOTE — PT/OT/SLP EVAL
"Occupational Therapy   Evaluation and Discharge Note    Name: Earl Abdul  MRN: [id number]  Admitting Diagnosis: Alcohol withdrawal syndrome with complication  Recent Surgery: * No surgery found *      Recommendations:     Discharge Recommendations: other (see comments)  Discharge Equipment Recommendations: none  Barriers to discharge:       Assessment:     Earl Adbul is a 63 y.o. female with a medical diagnosis of Alcohol withdrawal syndrome with complication. At this time, patient is functioning at their prior level of function and does not require further acute OT services.     Plan:     During this hospitalization, patient does not require further acute OT services.  Please re-consult if situation changes.    Plan of Care Reviewed with: patient, spouse    Subjective     Chief Complaint: alcohol withdrawal syndrome with complication  Patient/Family Comments/goals: "I've already brushed my teeth. It's 10 o'clock."    Occupational Profile:  Living Environment: pt lives with her  in a 2SH, 3 JANESSA with BHR, full light of stairs insife. Pt has a tub/shower combo.   Previous level of function: independent with ADLs and functional mobility.   Roles and Routines: Pt used to be an artist, enjoys watching TV and reading.   Equipment Used at home: oxygen  Assistance upon Discharge:     Pain/Comfort:  Pain Rating 1: 8/10  Location - Orientation 1: generalized  Location 1: head  Pain Addressed 1: Reposition, Distraction  Pain Rating Post-Intervention 1: other (see comments) (not rated)    Patients cultural, spiritual, Caodaism conflicts given the current situation: no    Objective:     Communicated with: RN prior to session.  Patient found HOB elevated with telemetry, Other (comments) ( present in room) upon OT entry to room.    General Precautions: Standard, fall, seizure  Orthopedic Precautions: N/A  Braces: N/A  Respiratory Status: Nasal cannula    Occupational Performance:    Bed Mobility:  "   Patient completed Supine to Sit with independence  Patient completed Sit to Supine with independence    Functional Mobility/Transfers:  Patient completed Sit <> Stand Transfer with independence  with  no assistive device     Activities of Daily Living:  Pt deferred ADLs secondary to have completed them already this morning.     Cognitive/Visual Perceptual:  Cognitive/Psychosocial Skills:     -       Oriented to: Person, Place, Time, and Situation   -       Follows Commands/attention:Follows two-step commands  -       Communication: clear/fluent  -       Safety awareness/insight to disability: intact   -       Mood/Affect/Coping skills/emotional control: Agitated    Physical Exam:  Sensation:    -       Intact  Upper Extremity Range of Motion:     -       Right Upper Extremity: WFL  -       Left Upper Extremity: WFL  Upper Extremity Strength: -       Right Upper Extremity: WFL  -       Left Upper Extremity: WFL   Strength:    -       Right Upper Extremity: WFL  -       Left Upper Extremity: WFL    AMPAC 6 Click ADL:  AMPAC Total Score: 24    Treatment & Education:  Whiteboard updated. Pt educated on role of OT, POC, safety during ADLs. Pt given call light and instructed to ask for assistance with needs/tranfers when needed.     Patient left HOB elevated with all lines intact, call button in reach, and  present    GOALS:   Multidisciplinary Problems       Occupational Therapy Goals       Not on file              Multidisciplinary Problems (Resolved)          Problem: Occupational Therapy    Goal Priority Disciplines Outcome Interventions   Occupational Therapy Goal   (Resolved)     OT, PT/OT Met    Description: Pt is currently functioning at baseline for ADLs and functional mobility, and is appropriate for discharge from acute care services. Please re-consult if  pt's functional status declines.                       History:     Past Medical History:   Diagnosis Date    Anemia     Anemia     Controlled  type 2 diabetes mellitus without complication, without long-term current use of insulin 11/30/2021    COPD (chronic obstructive pulmonary disease)     Depression     Diverticulitis     Fatty liver     GERD (gastroesophageal reflux disease)     Hyperlipidemia     Hypertension     Pancreatitis     Peptic ulcer disease     Polysubstance abuse     Posterior reversible encephalopathy syndrome     Sarcoidosis of lung     Sarcoidosis of lung     over 30 yrs ago    Seizures     7/2017    Suicide attempt     Suicide ideation          Past Surgical History:   Procedure Laterality Date    APPENDECTOMY      BILATERAL MASTECTOMY Bilateral 10/29/2020    Procedure: MASTECTOMY, BILATERAL;  Surgeon: Baylee Kevin MD;  [facility];  Service: General;  Laterality: Bilateral;    BREAST REVISION SURGERY Bilateral 2/11/2021    Procedure: BREAST REVISION SURGERY;  Surgeon: Scottie Johnson MD;  Location: [facility] OR [address] [place] [address];  Service: Plastics;  Laterality: Bilateral;    COLONOSCOPY N/A 7/28/2017    Procedure: COLONOSCOPY;  Surgeon: Aaron Alvarado MD;  Location: [facility];  Service: Endoscopy;  Laterality: N/A;    ESOPHAGOGASTRODUODENOSCOPY  10/7/2016, 11/6/2014    2016 - gastritis, duodenitis, 2014 erosive gastritis    ESOPHAGOGASTRODUODENOSCOPY N/A 2/11/2020    Procedure: ESOPHAGOGASTRODUODENOSCOPY (EGD);  Surgeon: Fawn Garrido MD;  Location: [facility];  Service: Endoscopy;  Laterality: N/A;    ESOPHAGOGASTRODUODENOSCOPY N/A 4/19/2021    Procedure: EGD (ESOPHAGOGASTRODUODENOSCOPY);  Surgeon: Paramjit Martino MD;  Location: [facility];  Service: Endoscopy;  Laterality: N/A;    FLEXIBLE SIGMOIDOSCOPY  11/06/2014    colitis    HYSTERECTOMY      IMPLANTATION OF PERMANENT SACRAL NERVE STIMULATOR N/A 7/12/2022    Procedure: INSERTION, NEUROSTIMULATOR, PERMANENT, SACRAL;  Surgeon: Juaquin Edwards MD;  Location: [facility] OR [address] [place] [address];  Service: Urology;  Laterality: N/A;  1hr    INJECTION FOR SENTINEL NODE IDENTIFICATION  Right 10/29/2020    Procedure: INJECTION, FOR SENTINEL NODE IDENTIFICATION;  Surgeon: Baylee Kevin MD;  Location: [address];  Service: General;  Laterality: Right;    INJECTION OF JOINT Right 10/10/2019    Procedure: Injection, Joint RIGHT ILIOPSOAS BURSA/TENDON INJECTION AND RIGHT GLUTEAL TENDON INJECTION WITH STEROID AND LIDOCAINE;  Surgeon: Guillaume Rico MD;  Location: [facility];  Service: Pain Management;  Laterality: Right;  NEEDS CONSENT    INSERTION OF BREAST TISSUE EXPANDER Bilateral 10/29/2020    Procedure: INSERTION, TISSUE EXPANDER, BREAST;  Surgeon: Scottie Johnson MD;  Location: [address];  Service: Plastics;  Laterality: Bilateral;  Right breast: 1082 g  Left breast: 1076 g    LIPOSUCTION Bilateral 2/11/2021    Procedure: LIPOSUCTION;  Surgeon: Scottie Johnson MD;  Location: [address];  Service: Plastics;  Laterality: Bilateral;    mediastenoscopy      REPLACEMENT OF IMPLANT OF BREAST Bilateral 2/11/2021    Procedure: REPLACEMENT, IMPLANT, BREAST;  Surgeon: Scottie Johnson MD;  Location: [address];  Service: Plastics;  Laterality: Bilateral;    SENTINEL LYMPH NODE BIOPSY Right 10/29/2020    Procedure: BIOPSY, LYMPH NODE, SENTINEL;  Surgeon: Baylee Kevin MD;  Location: [address];  Service: General;  Laterality: Right;    TONSILLECTOMY N/A 1970    TUBAL LIGATION         Time Tracking:     OT Date of Treatment: 06/17/23  OT Start Time: 1032  OT Stop Time: 1040  OT Total Time (min): 8 min    Billable Minutes:Evaluation 8    6/17/2023

## 2023-06-17 NOTE — ASSESSMENT & PLAN NOTE
- not on oxygen at home   - continue home Breo, combivent  - prn albuterol   - CXR negative   - wean O2 as tolerated   - O2 goal 8-92%

## 2023-06-17 NOTE — ASSESSMENT & PLAN NOTE
- endorsed suicidal ideations on day of admission   - h/o SI, depression, anxiety and alcohol abuse   - PEC started on 6/16  - psychiatry consulted. followup recs

## 2023-06-17 NOTE — ASSESSMENT & PLAN NOTE
Patient with Hypoxic Respiratory failure which is Acute.  she is not on home oxygen. Supplemental oxygen was provided and noted- Oxygen Concentration (%):  [28] 28    .   Signs/symptoms of respiratory failure include- tachypnea and lethargy. Contributing diagnoses includes - Aspiration, COPD and Pneumonia Labs and images were reviewed. Patient Has not had a recent ABG. Will treat underlying causes and adjust management of respiratory failure:    PLAN:  - not on oxygen at home   - O2 sat 88% on room air. On 2 L NC  - CXR nonacute   - followup TTE  - likely 2/2 tachypnea due to alcoholic ketosis   - wean O2 as tolerated

## 2023-06-17 NOTE — ASSESSMENT & PLAN NOTE
- She drinks about a bottle of vodka a day (750 ml).  - last drink day of admission on 6/16  - alcohol level on admit, 215  - followup PETH  - on CIWA protocol   - continue valium 10 mg q8h and prn ativan   - continue folate, thiamine, MVI  - replete electrolytes prn  - addiction psychiatry consulted. followup recs

## 2023-06-17 NOTE — PROGRESS NOTES
[facility] Medicine  Progress Note    Patient Name: Earl Abdul  MRN: [id number]  Patient Class: OP- Observation   Admission Date: 6/16/2023  Length of Stay: 1 days  Attending Physician: Andrei Sosa MD  Primary Care Provider: Andrew Rodriguez MD        Subjective:     Principal Problem:Alcohol withdrawal syndrome with complication        HPI:  Ms. Abdul is a 63 year old woman with PMH EtOH use disorder with history of prior seizures, multiple admissions for alcohol withdrawal, depression with suicide attempt in 2017, breast cancer, HTN, HLD, fibromyalgia, sarcoidosis, hypothyroidism, T2DM, CLL (not on treatment) and COPD who [place] [facility] with chief complaint of multiple [place].  Patient drank a lot of alcohol today, was walking around and lost her balance and fell. She reports that he did hit her head and loss consciousness. She endorsed facial pain and notes abrasion below right eye. She denies chest pain, abdominal pain, shortness breath, urinary symptoms, vomiting, numbness or weakness in her arms or legs. She drinks about a bottle of vodka a day (750 ml).    In the ED, she was afebrile, tachycardic to 108, tachypneic with RR 22, and O2 sat at 88% and was placed on 2 L NC. She was alert and oriented x3 and denied hallucinations. CT head showed no concern for intracranial hemorrhage. CT maxillofacial showed previous nasal fracture, no acute fractures. CT C-spine showed no concern for acute fractures. While in the ED, she later endorsed SI and she was PEC'd.     CBC showed WBC 17, CMP showed no electrolyte abnormalities and UA showed no concern for UTI. UDS positive for benzodiazepines. Ethanol level elevated at 215. TSH within normal limits. Patient started developing tremors, concern for alcohol withdrawal and was given a dose of valium 10 mg and subsequently given 1 mg Ativan. Addiction psychiatry consulted and patient was started on CIWA protocol. She was admitted to  Hospital Medicine service for further evaluation and management of alcohol withdrawal.       Overview/Hospital Course:  No notes on file    Interval History: Patient lying in bed, no acute distress. No acute events overnight. Slight improvement in mental status today. Lethargic and oriented x2. Patient reports that tremors have still improved since yesterday. Patient also notes good UOP, regular bowel movements and decreased po intake. Tolerating valium 10 mg q8. She required once prn IV ativan overnight for CIWA of 14 but since this morning, CIWA was found to be 7. Addiction psychiatry consulted and will followup the recs. Patient remains PEC'd for now.    Review of Systems   Unable to perform ROS: Mental status change   Objective:     Vital Signs (Most Recent):  Temp: 98.1 °F (36.7 °C) (06/17/23 1025)  Pulse: 91 (06/17/23 1025)  Resp: 19 (06/17/23 1025)  BP: (!) 175/81 (06/17/23 1025)  SpO2: (!) 93 % (06/17/23 1025) Vital Signs (24h Range):  Temp:  [98.1 °F (36.7 °C)-99.1 °F (37.3 °C)] 98.1 °F (36.7 °C)  Pulse:  [] 91  Resp:  [17-19] 19  SpO2:  [93 %-95 %] 93 %  BP: (146-175)/(67-81) 175/81     Weight: 73.9 kg (163 lb)  Body mass index is 26.31 kg/m².  No intake or output data in the 24 hours ending 06/17/23 1155      Physical Exam  Constitutional:       Appearance: She is well-developed. She is ill-appearing.   HENT:      Head: Normocephalic and atraumatic.   Eyes:      General: No scleral icterus.     Extraocular Movements: Extraocular movements intact.      Pupils: Pupils are equal, round, and reactive to light.   Neck:      Vascular: No JVD.   Cardiovascular:      Rate and Rhythm: Normal rate and regular rhythm.      Heart sounds: No murmur heard.    No friction rub. No gallop.   Pulmonary:      Effort: Pulmonary effort is normal. No respiratory distress.      Breath sounds: Normal breath sounds. No wheezing.   Abdominal:      General: Bowel sounds are normal. There is no distension.      Palpations:  Abdomen is soft.      Tenderness: There is no abdominal tenderness.   Musculoskeletal:         General: No deformity. Normal range of motion.      Cervical back: Neck supple.   Lymphadenopathy:      Cervical: No cervical adenopathy.   Skin:     General: Skin is warm and dry.      Capillary Refill: Capillary refill takes less than 2 seconds.      Findings: No erythema or rash.   Neurological:      Mental Status: She is lethargic and disoriented.      Cranial Nerves: No cranial nerve deficit.      Sensory: No sensory deficit.   Psychiatric:         Attention and Perception: She is inattentive.         Cognition and Memory: Cognition is impaired. Memory is impaired.           Significant Labs: A1C:   Recent Labs   Lab 12/23/22  0511 03/26/23  1656 06/16/23  1817   HGBA1C 4.6 5.1 4.7     Blood Culture: No results for input(s): LABBLOO in the last 48 hours.  CBC:   Recent Labs   Lab 06/16/23  0918 06/17/23  0407   WBC 17.74* 4.66   HGB 12.0 9.8*   HCT 38.7 30.6*   * 74*     CMP:   Recent Labs   Lab 06/16/23  0918 06/17/23  0407   * 142   K 3.9 3.1*    103   CO2 27 27   GLU 76 82   BUN 6* 6*   CREATININE 0.7 0.6   CALCIUM 9.1 8.2*   PROT 7.1  --    ALBUMIN 4.2  --    BILITOT 0.4  --    ALKPHOS 48*  --    AST 29  --    ALT 14  --    ANIONGAP 14 12     Coagulation:   Recent Labs   Lab 06/16/23  0918   INR 1.0     Lactic Acid: No results for input(s): LACTATE in the last 48 hours.  Magnesium:   Recent Labs   Lab 06/17/23  0407   MG 1.3*     POCT Glucose:   Recent Labs   Lab 06/16/23  1228 06/16/23  2041   POCTGLUCOSE 77 75     Troponin: No results for input(s): TROPONINI, TROPONINIHS in the last 48 hours.  TSH:   Recent Labs   Lab 06/16/23  0918   TSH 1.283     Urine Studies:   Recent Labs   Lab 06/16/23  1220   COLORU Yellow   APPEARANCEUA Clear   PHUR 6.0   SPECGRAV 1.010   PROTEINUA Negative   GLUCUA Negative   KETONESU Negative   BILIRUBINUA Negative   OCCULTUA Negative   NITRITE Negative    LEUKOCYTESUR Negative       Significant Imaging: I have reviewed all pertinent imaging results/findings within the past 24 hours.      Assessment/Plan:      * Alcohol withdrawal syndrome with complication  - She drinks about a bottle of vodka a day (750 ml).  - last drink day of admission on 6/16  - alcohol level on admit, 215  - followup PETH  - on CIWA protocol   - continue valium 10 mg q8h and prn ativan   - continue folate, thiamine, MVI  - replete electrolytes prn  - addiction psychiatry consulted. followup recs    GERD (gastroesophageal reflux disease)  - continue home protonix      Hypernatremia  - likely 2/2 hypovolemic hypernatremia  - completed NS infusion   - RESOLVED       Acute hypoxemic respiratory failure  Patient with Hypoxic Respiratory failure which is Acute.  she is not on home oxygen. Supplemental oxygen was provided and noted- Oxygen Concentration (%):  [28] 28    .   Signs/symptoms of respiratory failure include- tachypnea and lethargy. Contributing diagnoses includes - Aspiration, COPD and Pneumonia Labs and images were reviewed. Patient Has not had a recent ABG. Will treat underlying causes and adjust management of respiratory failure:    PLAN:  - not on oxygen at home   - O2 sat 88% on room air. On 2 L NC  - CXR nonacute   - followup TTE  - likely 2/2 tachypnea due to alcoholic ketosis   - wean O2 as tolerated     Migraine        CLL (chronic lymphocytic leukemia)  - Established with Dr. Gonzalez. Not currently on treatment.   - Chronic thrombocytopenia noted.  Possibly associated with alcohol use.   - Outpatient Heme-Onc follow up         Lymphocytosis        Type 2 diabetes mellitus with hyperglycemia  Patient's FSGs are controlled on current medication regimen.  Last A1c reviewed-   Lab Results   Component Value Date    HGBA1C 4.7 06/16/2023     Most recent fingerstick glucose reviewed-   Recent Labs   Lab 06/16/23  1228 06/16/23 2041   POCTGLUCOSE 77 75     Current correctional scale   Low  Maintain anti-hyperglycemic dose as follows-   Antihyperglycemics (From admission, onward)    Start     Stop Route Frequency Ordered    06/16/23 1658  insulin aspart U-100 pen 0-5 Units         -- SubQ Before meals & nightly PRN 06/16/23 1702        Hold Oral hypoglycemics while patient is in the hospital.  - Holding home metformin 1000mg BID      Anemia  - Hb on admit, 11  - baseline Hb 9-10  - likely hemoconcentration   - transfuse for Hb < 7  - CBC daily         COPD (chronic obstructive pulmonary disease)  - not on oxygen at home   - continue home Breo, combivent  - prn albuterol   - CXR negative   - wean O2 as tolerated   - O2 goal 8-92%         Malignant neoplasm of central portion of right breast in female, estrogen receptor positive  - T1b N0 stage IA breast cancer diagnosed October 2020.   - S/p bilateral mastectomy with no adjuvant therapy; received Letrozole February 2021-may 2021         VINICIO (obstructive sleep apnea)  - use CPAP nightly when AMS resolves       Suicidal ideation  - endorsed suicidal ideations on day of admission   - h/o SI, depression, anxiety and alcohol abuse   - PEC started on 6/16  - psychiatry consulted. followup recs      Hyperlipidemia  - not currently on a statin           History of sarcoidosis  - Patient with documented history of sarcoidosis.  Not currently on treatment.         Depression with anxiety  Patient has persistent depression which is moderate and is currently uncontrolled. Will Continue anti-depressant medications. We will consult psychiatry at this time. Patient does not display psychosis at this time. Continue to monitor closely and adjust plan of care as needed.    - continue home cymbalta and trazodone  - psychiatry consulted. apprec recs         History of substance abuse        Sarcoidosis  - Patient with documented history of sarcoidosis.  Not currently on treatment.      Fibromyalgia  - continue home cymbalta      Essential hypertension  - continue home  "lisiniopril    Alcohol use disorder, severe, dependence  - see "alcohol withdrawal"        VTE Risk Mitigation (From admission, onward)         Ordered     heparin (porcine) injection 5,000 Units  Every 8 hours         06/16/23 1702     IP VTE HIGH RISK PATIENT  Once         06/16/23 1702     Place sequential compression device  Until discontinued         06/16/23 1702                Discharge Planning   BECCA: 6/20/2023     Code Status: Full Code   Is the patient medically ready for discharge?: No    Reason for patient still in hospital (select all that apply): Patient unstable, Patient trending condition, Laboratory test, Treatment, Imaging, Consult recommendations and PT / OT recommendations           Time spent in care of patient: > 35 minutes             Andrei Sosa MD  Department of Hospital Medicine   [facility]    "

## 2023-06-17 NOTE — PLAN OF CARE
Problem: Occupational Therapy  Goal: Occupational Therapy Goal  Description: Pt is currently functioning at baseline for ADLs and functional mobility, and is appropriate for discharge from acute care services. Please re-consult if  pt's functional status declines.  Outcome: Met

## 2023-06-17 NOTE — ASSESSMENT & PLAN NOTE
- Hb on admit, 11  - baseline Hb 9-10  - likely hemoconcentration   - transfuse for Hb < 7  - CBC daily

## 2023-06-17 NOTE — NURSING
Nurses Note -- 4 Eyes      6/17/2023   3:45 AM      Skin assessed during: Admit      [x] No Altered Skin Integrity Present    []Prevention Measures Documented      [] Yes- Altered Skin Integrity Present or Discovered   [] LDA Added if Not in Epic (Describe Wound)   [] New Altered Skin Integrity was Present on Admit and Documented in LDA   [] Wound Image Taken    Wound Care Consulted? No    Attending Nurse:  Johanna Nye RN     Second RN/Staff Member:  linda conway

## 2023-06-17 NOTE — ASSESSMENT & PLAN NOTE
Patient's FSGs are controlled on current medication regimen.  Last A1c reviewed-   Lab Results   Component Value Date    HGBA1C 4.7 06/16/2023     Most recent fingerstick glucose reviewed-   Recent Labs   Lab 06/16/23  1228 06/16/23  2041   POCTGLUCOSE 77 75     Current correctional scale  Low  Maintain anti-hyperglycemic dose as follows-   Antihyperglycemics (From admission, onward)    Start     Stop Route Frequency Ordered    06/16/23 1658  insulin aspart U-100 pen 0-5 Units         -- SubQ Before meals & nightly PRN 06/16/23 1702        Hold Oral hypoglycemics while patient is in the hospital.  - Holding home metformin 1000mg BID

## 2023-06-18 LAB
ANION GAP SERPL CALC-SCNC: 8 MMOL/L (ref 8–16)
ANISOCYTOSIS BLD QL SMEAR: SLIGHT
ASCENDING AORTA: 2.57 CM
AV INDEX (PROSTH): 0.67
AV MEAN GRADIENT: 4 MMHG
AV PEAK GRADIENT: 7 MMHG
AV VALVE AREA: 2.04 CM2
AV VELOCITY RATIO: 0.56
BASOPHILS # BLD AUTO: 0.01 K/UL (ref 0–0.2)
BASOPHILS NFR BLD: 0.3 % (ref 0–1.9)
BSA FOR ECHO PROCEDURE: 1.86 M2
BUN SERPL-MCNC: 5 MG/DL (ref 8–23)
CALCIUM SERPL-MCNC: 8.2 MG/DL (ref 8.7–10.5)
CHLORIDE SERPL-SCNC: 108 MMOL/L (ref 95–110)
CO2 SERPL-SCNC: 25 MMOL/L (ref 23–29)
CREAT SERPL-MCNC: 0.7 MG/DL (ref 0.5–1.4)
CV ECHO LV RWT: 0.39 CM
DIFFERENTIAL METHOD: ABNORMAL
DOP CALC AO PEAK VEL: 1.33 M/S
DOP CALC AO VTI: 26.89 CM
DOP CALC LVOT AREA: 3 CM2
DOP CALC LVOT DIAMETER: 1.97 CM
DOP CALC LVOT PEAK VEL: 0.74 M/S
DOP CALC LVOT STROKE VOLUME: 54.99 CM3
DOP CALCLVOT PEAK VEL VTI: 18.05 CM
E WAVE DECELERATION TIME: 357.31 MSEC
E/A RATIO: 0.61
E/E' RATIO: 8.13 M/S
ECHO LV POSTERIOR WALL: 0.94 CM (ref 0.6–1.1)
EJECTION FRACTION: 65 %
EOSINOPHIL # BLD AUTO: 0.1 K/UL (ref 0–0.5)
EOSINOPHIL NFR BLD: 1.6 % (ref 0–8)
ERYTHROCYTE [DISTWIDTH] IN BLOOD BY AUTOMATED COUNT: 16.6 % (ref 11.5–14.5)
EST. GFR  (NO RACE VARIABLE): >60 ML/MIN/1.73 M^2
FRACTIONAL SHORTENING: 38 % (ref 28–44)
GLUCOSE SERPL-MCNC: 142 MG/DL (ref 70–110)
HCT VFR BLD AUTO: 30.5 % (ref 37–48.5)
HGB BLD-MCNC: 9.4 G/DL (ref 12–16)
IMM GRANULOCYTES # BLD AUTO: 0.01 K/UL (ref 0–0.04)
IMM GRANULOCYTES NFR BLD AUTO: 0.3 % (ref 0–0.5)
INTERVENTRICULAR SEPTUM: 0.72 CM (ref 0.6–1.1)
LA MAJOR: 6.05 CM
LA MINOR: 6 CM
LA WIDTH: 3.54 CM
LEFT ATRIUM SIZE: 3.5 CM
LEFT ATRIUM VOLUME INDEX: 34.7 ML/M2
LEFT ATRIUM VOLUME: 63.45 CM3
LEFT INTERNAL DIMENSION IN SYSTOLE: 2.96 CM (ref 2.1–4)
LEFT VENTRICLE DIASTOLIC VOLUME INDEX: 58.7 ML/M2
LEFT VENTRICLE DIASTOLIC VOLUME: 107.42 ML
LEFT VENTRICLE MASS INDEX: 73 G/M2
LEFT VENTRICLE SYSTOLIC VOLUME INDEX: 18.4 ML/M2
LEFT VENTRICLE SYSTOLIC VOLUME: 33.75 ML
LEFT VENTRICULAR INTERNAL DIMENSION IN DIASTOLE: 4.8 CM (ref 3.5–6)
LEFT VENTRICULAR MASS: 132.88 G
LV LATERAL E/E' RATIO: 7.22 M/S
LV SEPTAL E/E' RATIO: 9.29 M/S
LYMPHOCYTES # BLD AUTO: 2.1 K/UL (ref 1–4.8)
LYMPHOCYTES NFR BLD: 67.1 % (ref 18–48)
MAGNESIUM SERPL-MCNC: 1.7 MG/DL (ref 1.6–2.6)
MCH RBC QN AUTO: 27.3 PG (ref 27–31)
MCHC RBC AUTO-ENTMCNC: 30.8 G/DL (ref 32–36)
MCV RBC AUTO: 89 FL (ref 82–98)
MONOCYTES # BLD AUTO: 0.2 K/UL (ref 0.3–1)
MONOCYTES NFR BLD: 7.3 % (ref 4–15)
MV PEAK A VEL: 1.06 M/S
MV PEAK E VEL: 0.65 M/S
MV STENOSIS PRESSURE HALF TIME: 103.62 MS
MV VALVE AREA P 1/2 METHOD: 2.12 CM2
NEUTROPHILS # BLD AUTO: 0.7 K/UL (ref 1.8–7.7)
NEUTROPHILS NFR BLD: 23.4 % (ref 38–73)
NRBC BLD-RTO: 0 /100 WBC
PHOSPHATE SERPL-MCNC: 3.2 MG/DL (ref 2.7–4.5)
PLATELET # BLD AUTO: 80 K/UL (ref 150–450)
PMV BLD AUTO: 11 FL (ref 9.2–12.9)
POCT GLUCOSE: 102 MG/DL (ref 70–110)
POCT GLUCOSE: 122 MG/DL (ref 70–110)
POLYCHROMASIA BLD QL SMEAR: ABNORMAL
POTASSIUM SERPL-SCNC: 3.9 MMOL/L (ref 3.5–5.1)
RA MAJOR: 5.17 CM
RA WIDTH: 3.55 CM
RBC # BLD AUTO: 3.44 M/UL (ref 4–5.4)
RIGHT VENTRICULAR END-DIASTOLIC DIMENSION: 3.72 CM
SINUS: 2.97 CM
SODIUM SERPL-SCNC: 141 MMOL/L (ref 136–145)
STJ: 2.9 CM
TDI LATERAL: 0.09 M/S
TDI SEPTAL: 0.07 M/S
TDI: 0.08 M/S
TRICUSPID ANNULAR PLANE SYSTOLIC EXCURSION: 2.46 CM
WBC # BLD AUTO: 3.13 K/UL (ref 3.9–12.7)

## 2023-06-18 PROCEDURE — G0378 HOSPITAL OBSERVATION PER HR: HCPCS

## 2023-06-18 PROCEDURE — 96374 THER/PROPH/DIAG INJ IV PUSH: CPT | Mod: 59

## 2023-06-18 PROCEDURE — 99214 PR OFFICE/OUTPT VISIT, EST, LEVL IV, 30-39 MIN: ICD-10-PCS | Mod: ,,, | Performed by: PSYCHIATRY & NEUROLOGY

## 2023-06-18 PROCEDURE — 63600175 PHARM REV CODE 636 W HCPCS: Performed by: INTERNAL MEDICINE

## 2023-06-18 PROCEDURE — 99233 PR SUBSEQUENT HOSPITAL CARE,LEVL III: ICD-10-PCS | Mod: ,,, | Performed by: INTERNAL MEDICINE

## 2023-06-18 PROCEDURE — 84100 ASSAY OF PHOSPHORUS: CPT | Performed by: INTERNAL MEDICINE

## 2023-06-18 PROCEDURE — 94761 N-INVAS EAR/PLS OXIMETRY MLT: CPT

## 2023-06-18 PROCEDURE — 99233 SBSQ HOSP IP/OBS HIGH 50: CPT | Mod: ,,, | Performed by: INTERNAL MEDICINE

## 2023-06-18 PROCEDURE — 96376 TX/PRO/DX INJ SAME DRUG ADON: CPT

## 2023-06-18 PROCEDURE — 99214 OFFICE O/P EST MOD 30 MIN: CPT | Mod: ,,, | Performed by: PSYCHIATRY & NEUROLOGY

## 2023-06-18 PROCEDURE — 94640 AIRWAY INHALATION TREATMENT: CPT

## 2023-06-18 PROCEDURE — 25000003 PHARM REV CODE 250: Performed by: INTERNAL MEDICINE

## 2023-06-18 PROCEDURE — 96372 THER/PROPH/DIAG INJ SC/IM: CPT | Performed by: INTERNAL MEDICINE

## 2023-06-18 PROCEDURE — 80048 BASIC METABOLIC PNL TOTAL CA: CPT | Performed by: INTERNAL MEDICINE

## 2023-06-18 PROCEDURE — 83735 ASSAY OF MAGNESIUM: CPT | Performed by: INTERNAL MEDICINE

## 2023-06-18 PROCEDURE — 25000242 PHARM REV CODE 250 ALT 637 W/ HCPCS: Performed by: INTERNAL MEDICINE

## 2023-06-18 PROCEDURE — 85025 COMPLETE CBC W/AUTO DIFF WBC: CPT | Performed by: INTERNAL MEDICINE

## 2023-06-18 PROCEDURE — 36415 COLL VENOUS BLD VENIPUNCTURE: CPT | Performed by: INTERNAL MEDICINE

## 2023-06-18 RX ADMIN — LORAZEPAM 2 MG: 2 INJECTION INTRAMUSCULAR; INTRAVENOUS at 09:06

## 2023-06-18 RX ADMIN — HEPARIN SODIUM 5000 UNITS: 5000 INJECTION INTRAVENOUS; SUBCUTANEOUS at 05:06

## 2023-06-18 RX ADMIN — PANTOPRAZOLE SODIUM 40 MG: 40 TABLET, DELAYED RELEASE ORAL at 08:06

## 2023-06-18 RX ADMIN — TRAZODONE HYDROCHLORIDE 300 MG: 100 TABLET ORAL at 10:06

## 2023-06-18 RX ADMIN — DIAZEPAM 10 MG: 5 TABLET ORAL at 06:06

## 2023-06-18 RX ADMIN — IBUPROFEN 400 MG: 400 TABLET, FILM COATED ORAL at 08:06

## 2023-06-18 RX ADMIN — DULOXETINE HYDROCHLORIDE 90 MG: 30 CAPSULE, DELAYED RELEASE ORAL at 08:06

## 2023-06-18 RX ADMIN — DIAZEPAM 10 MG: 5 TABLET ORAL at 05:06

## 2023-06-18 RX ADMIN — LISINOPRIL 20 MG: 20 TABLET ORAL at 08:06

## 2023-06-18 RX ADMIN — LORAZEPAM 2 MG: 2 INJECTION INTRAMUSCULAR; INTRAVENOUS at 11:06

## 2023-06-18 RX ADMIN — THERA TABS 1 TABLET: TAB at 08:06

## 2023-06-18 RX ADMIN — NALTREXONE HYDROCHLORIDE 50 MG: 50 TABLET, FILM COATED ORAL at 08:06

## 2023-06-18 RX ADMIN — MUPIROCIN: 20 OINTMENT TOPICAL at 08:06

## 2023-06-18 RX ADMIN — ACETAMINOPHEN 650 MG: 325 TABLET ORAL at 08:06

## 2023-06-18 RX ADMIN — HEPARIN SODIUM 5000 UNITS: 5000 INJECTION INTRAVENOUS; SUBCUTANEOUS at 06:06

## 2023-06-18 RX ADMIN — Medication 100 MG: at 08:06

## 2023-06-18 RX ADMIN — DIAZEPAM 10 MG: 5 TABLET ORAL at 10:06

## 2023-06-18 RX ADMIN — FLUTICASONE FUROATE AND VILANTEROL TRIFENATATE 1 PUFF: 100; 25 POWDER RESPIRATORY (INHALATION) at 09:06

## 2023-06-18 RX ADMIN — FOLIC ACID 1 MG: 1 TABLET ORAL at 08:06

## 2023-06-18 RX ADMIN — LEVOTHYROXINE SODIUM 50 MCG: 50 TABLET ORAL at 06:06

## 2023-06-18 NOTE — PROGRESS NOTES
"CONSULTATION LIAISON PSYCHIATRY PROGRESS NOTE    Patient Name: Earl Abdul  MRN: [id number]  Patient Class: OP- Observation  Admission Date: 6/16/2023  Attending Physician: Andrei Sosa MD      SUBJECTIVE:   Earl Abdul is a 63 y.o. female with past psychiatric history of tobacco abuse, alcohol abuse with seizures and complicated withdrawal, and depression with SA in 2017 & past pertinent medical history of breast cancer, HTN, HLD, fibromyalgia, sarcoidosis, hypothyroidism, T2DM, CLL (not on treatment) and COPD  presents to the ED/admitted to the hospital for Alcohol withdrawal syndrome with complication.  Utox + for benzo (prescribed Librium 6/6 per ),  EtOH 215, PETH pending.      Psychiatry consulted for management of alcohol withdrawal, PEC'd for SI.    Today, patient is found in bed with  at bedside. She says she is "great" and slept very well. She remains with a coarse tremor she says is worse than her baseline tremor. She denies nausea, vomiting, diarrhea, night sweats, AVH or agitation. She says she is interested in leaving the hospital and going to [facility] for treatment. She says she also wants to be back on Prozac and feels the Trazodone is not helping her. She still feels "very anxious" about things but not with panic attacks. She also denies SI today.     Overnight the patient required PRN Ativan 2mg @ 2011. She was hypertensive into 170s/80, and her platelets remain stable but low.      OBJECTIVE:    Mental Status Exam:  General Appearance:  appears stated age  Behavior: cooperative  Involuntary Movements and Motor Activity: +tremors  Gait and Station: unable to assess - patient lying down or seated  Speech and Language: intact; normal rate, rhythm, volume, tone, and pitch; conversational, spontaneous, and coherent; speaks and understands English proficiently and fluently; repeats words and phrases, no word finding difficulties are noted  Mood: "very anxious"  Affect: " euthymic  Thought Process and Associations: intact; linear, goal-directed, organized, and logical; no loosening of associations noted  Thought Content and Perceptions:: no suicidal or homicidal ideation, no auditory or visual hallucinations, no paranoid ideation, no ideas of reference, no evidence of delusions or psychosis  Sensorium and Orientation: intact; alert with clear sensorium; oriented fully to person, place, time and situation  Recent and Remote Memory: grossly intact, able to recall relevant and salient information from the recent and remote past  Attention and Concentration: grossly intact, attentive to the conversation and not readily distractible  Fund of Knowledge: grossly intact, used appropriate vocabulary and demonstrated an awareness of current events, consistent with educational level achieved  Insight: intact, demonstrates awareness of illness and situation  Judgment: intact, behavior is adequate/appropriate to the circumstances, compliant with health provider's recommendations and instructions    CAM ICU positive? no      ASSESSMENT & RECOMMENDATIONS   Alcohol use disorder, severe  Tobacco use disorder  Major depressive disorder, recurrent  Unspecified anxiety disorder        Continue Trazodone 300 mg PO nightly  Decrease Cymbalta to 60 mg PO daily.  Monitor platelets, LFTs  Start Prozac 10mg po daily for low mood and patient response. Cross taper off the Cymbalta given this medication can be hepatoxic and cause HTN (while patient in withdrawal).   Stop Naltrexone  Continue Diazepam 5 mg TID, low threshold to increase to q6h  PRN Ativan 2 mg PO q4h PRN CIWA > 8, or SBP > 160, DBP > 100, or HR > 110 (meeting 2 out of 3 vital sign criteria).  Monitor EKG for Qtc     RISK ASSESSMENT  No PEC indicated     FOLLOW UP  Will follow up while in house        DISPOSITION - home once medically cleared:         Please contact ON CALL psychiatry service (24/7) for any acute issues that may arise.      Boubacar Majano   Psychiatry  [facility]  6/18/2023 4:07 PM        --------------------------------------------------------------------------------------------------------------------------------------------------------------------------------------------------------------------------------------    CONTINUED OBJECTIVE clinical data & findings reviewed and noted for above decision making    Current Medications:   Scheduled Meds:    diazePAM  10 mg Oral Q8H    DULoxetine  90 mg Oral Daily    fluticasone furoate-vilanteroL  1 puff Inhalation Daily    folic acid  1 mg Oral Daily    heparin (porcine)  5,000 Units Subcutaneous Q8H    levothyroxine  50 mcg Oral Before breakfast    lisinopriL  20 mg Oral Daily    multivitamin  1 tablet Oral Daily    mupirocin   Topical (Top) BID    naltrexone  50 mg Oral Daily    pantoprazole  40 mg Oral Daily    thiamine  100 mg Oral Daily    traZODone  300 mg Oral QHS     PRN Meds: acetaminophen, albuterol, dextrose 10%, dextrose 10%, dextrose, dextrose, dicyclomine, glucagon (human recombinant), ibuprofen, insulin aspart U-100, loperamide, lorazepam, melatonin, naloxone, ondansetron, polyethylene glycol, prochlorperazine, sodium chloride 0.9%    Allergies:   Review of patient's allergies indicates:   Allergen Reactions    Lortab [hydrocodone-acetaminophen] Itching    Promethazine Itching and Other (See Comments)       Vitals  Vitals:    06/18/23 1140   BP: (!) 170/79   Pulse: 82   Resp: 16   Temp: 98 °F (36.7 °C)       Labs/Imaging/Studies:  Recent Results (from the past 24 hour(s))   POCT glucose    Collection Time: 06/17/23  4:31 PM   Result Value Ref Range    POCT Glucose 150 (H) 70 - 110 mg/dL   POCT glucose    Collection Time: 06/17/23  8:15 PM   Result Value Ref Range    POCT Glucose 142 (H) 70 - 110 mg/dL   Basic metabolic panel    Collection Time: 06/18/23  4:25 AM   Result Value Ref Range    Sodium 141 136 - 145 mmol/L    Potassium 3.9 3.5 - 5.1 mmol/L     Chloride 108 95 - 110 mmol/L    CO2 25 23 - 29 mmol/L    Glucose 142 (H) 70 - 110 mg/dL    BUN 5 (L) 8 - 23 mg/dL    Creatinine 0.7 0.5 - 1.4 mg/dL    Calcium 8.2 (L) 8.7 - 10.5 mg/dL    Anion Gap 8 8 - 16 mmol/L    eGFR >60.0 >60 mL/min/1.73 m^2   CBC auto differential    Collection Time: 06/18/23  4:25 AM   Result Value Ref Range    WBC 3.13 (L) 3.90 - 12.70 K/uL    RBC 3.44 (L) 4.00 - 5.40 M/uL    Hemoglobin 9.4 (L) 12.0 - 16.0 g/dL    Hematocrit 30.5 (L) 37.0 - 48.5 %    MCV 89 82 - 98 fL    MCH 27.3 27.0 - 31.0 pg    MCHC 30.8 (L) 32.0 - 36.0 g/dL    RDW 16.6 (H) 11.5 - 14.5 %    Platelets 80 (L) 150 - 450 K/uL    MPV 11.0 9.2 - 12.9 fL    Immature Granulocytes 0.3 0.0 - 0.5 %    Gran # (ANC) 0.7 (L) 1.8 - 7.7 K/uL    Immature Grans (Abs) 0.01 0.00 - 0.04 K/uL    Lymph # 2.1 1.0 - 4.8 K/uL    Mono # 0.2 (L) 0.3 - 1.0 K/uL    Eos # 0.1 0.0 - 0.5 K/uL    Baso # 0.01 0.00 - 0.20 K/uL    nRBC 0 0 /100 WBC    Gran % 23.4 (L) 38.0 - 73.0 %    Lymph % 67.1 (H) 18.0 - 48.0 %    Mono % 7.3 4.0 - 15.0 %    Eosinophil % 1.6 0.0 - 8.0 %    Basophil % 0.3 0.0 - 1.9 %    Aniso Slight     Poly Occasional     Differential Method Automated    Magnesium    Collection Time: 06/18/23  4:25 AM   Result Value Ref Range    Magnesium 1.7 1.6 - 2.6 mg/dL   Phosphorus    Collection Time: 06/18/23  4:25 AM   Result Value Ref Range    Phosphorus 3.2 2.7 - 4.5 mg/dL   POCT glucose    Collection Time: 06/18/23  7:22 AM   Result Value Ref Range    POCT Glucose 102 70 - 110 mg/dL   POCT glucose    Collection Time: 06/18/23 11:06 AM   Result Value Ref Range    POCT Glucose 122 (H) 70 - 110 mg/dL     Imaging Results              X-Ray Chest AP Portable (Final result)  Result time 06/16/23 11:43:25      Final result by Luis Holder MD (06/16/23 11:43:25)                   Impression:      See above      Electronically signed by: Luis Holder MD  Date:    06/16/2023  Time:    11:43               Narrative:    EXAMINATION:  XR CHEST AP  PORTABLE    CLINICAL HISTORY:  Shortness of breath    TECHNIQUE:  Single frontal view of the chest was performed.    COMPARISON:  N 05/15/2023 one    FINDINGS:  Heart size normal.  The lungs are clear.  No pleural effusion.  Surgical changes in the breast identified similar to the previous study                                       CT Head Without Contrast (Final result)  Result time 06/16/23 11:06:29      Final result by Kevin Miller MD (06/16/23 11:06:29)                   Impression:      1. No acute intracranial findings.  2. Recent appearing mild displaced left nasal bone fracture.  No additional recent appearing for facial bone fractures identified.  3. No acute cervical spine fracture.  4. Extensive cervical and supraclavicular adenopathy.  Continued attention on follow-up recommended.  5. Additional details, as provided in the body of report.      Electronically signed by: Kevin Miller  Date:    06/16/2023  Time:    11:06               Narrative:    EXAMINATION:  CT HEAD WITHOUT CONTRAST; CT MAXILLOFACIAL WITHOUT CONTRAST; CT CERVICAL SPINE WITHOUT CONTRAST    CLINICAL HISTORY:  Head trauma, minor (Age >= 65y);; Facial trauma, blunt;; Neck trauma (Age >= 65y);    TECHNIQUE:  Low dose axial images were obtained through the head, cervical spine, and facial bones.  Coronal and sagittal reformations were also performed. Contrast was not administered.    COMPARISON:  CT head 05/15/2023.    FINDINGS:  Head:    Blood: No acute intracranial hemorrhage.    Parenchyma: No definite loss of gray-white differentiation to suggest acute or subacute transcortical infarct. Remote lacunar infarcts versus prominent perivascular spaces inferior left basal ganglia, as before.  Generalized pattern age-related parenchymal volume loss.  Nonspecific areas of white matter hypoattenuation may relate to sequela of chronic small vessel ischemic disease.    Ventricles/Extra-axial spaces: No abnormal extra-axial fluid collection.  Basal cisterns are patent.    Vessels: Mild-to-moderate atherosclerotic calcification.    Orbits: See below.    Scalp: Unremarkable.    Skull: There are no depressed skull fractures or destructive bone lesions.    Sinuses and mastoids: See below.    Other findings: None    Facial bones:    Acute facial fractures: Recent appearing mild displaced nasal bone fracture, with mild leftward deviation of the nasal arch, new since head CT 04/01/2023.  Overlying soft tissue swelling is noted without definite radiopaque foreign body.  Nasal septum appears intact.  Nasolacrimal ducts appear uninvolved.    No definite additional recent appearing facial bone fracture seen.    Pterygoid plates, Zygomatic arches, Sphenotemporal buttresses: Intact.    Nasal Bones: As above.    Mandible: The mandible is intact.  Leftward asymmetric TMJ DJD.    Dentition: No tooth fracture. No periapical lucencies.    Sinuses: There are no fluid levels in the sinuses.    Imaged mastoids and middle ears: Partial opacification of the dependent right mastoid air cells, possibly on the basis of mucosal thickening and or effusion.    Imaged upper cervical spine: See below.    Facial soft tissues: See above.    Orbits: The bony orbits and orbital contents are atraumatic.    Imaged intracranial structures and upper aerodigestive tract: Unremarkable.    Cervical spine:    Comment: Motion compromised examination.    Fractures: No acute fractures    Alignment: Straightening of anticipated cervical lordosis.  Grade 1 anterolisthesis of C4 on C5.  Atlanto-axial and atlanto-occipital joints: Atlanto-axial and atlanto-occipital intervals are not widened.  Facet joints: There is no traumatic facet joint widening.Degenerative facet arthropathy  Vertebral bodies: Heterogeneous marrow attenuation.  Appearance relatively similar to osseous structures seen on abdomen/pelvis CT 04/26/2023 and chest, abdomen, and pelvis CT 03/13/2023.  Discs: Degenerative disc osteophyte  complex formation.  Spinal canal and foraminal narrowing: Although CT does not optimally evaluate the soft tissue contents of the spinal canal and foramina, no critical stenosis is suggested.    At C5-6, suspect moderate left foraminal narrowing      Paraspinal soft tissues: Extensive cervical and supraclavicular adenopathy.    Upper Lungs:Clear                                       CT Maxillofacial Without Contrast (Final result)  Result time 06/16/23 11:06:29      Final result by Kevin Miller MD (06/16/23 11:06:29)                   Impression:      1. No acute intracranial findings.  2. Recent appearing mild displaced left nasal bone fracture.  No additional recent appearing for facial bone fractures identified.  3. No acute cervical spine fracture.  4. Extensive cervical and supraclavicular adenopathy.  Continued attention on follow-up recommended.  5. Additional details, as provided in the body of report.      Electronically signed by: Kevin Miller  Date:    06/16/2023  Time:    11:06               Narrative:    EXAMINATION:  CT HEAD WITHOUT CONTRAST; CT MAXILLOFACIAL WITHOUT CONTRAST; CT CERVICAL SPINE WITHOUT CONTRAST    CLINICAL HISTORY:  Head trauma, minor (Age >= 65y);; Facial trauma, blunt;; Neck trauma (Age >= 65y);    TECHNIQUE:  Low dose axial images were obtained through the head, cervical spine, and facial bones.  Coronal and sagittal reformations were also performed. Contrast was not administered.    COMPARISON:  CT head 05/15/2023.    FINDINGS:  Head:    Blood: No acute intracranial hemorrhage.    Parenchyma: No definite loss of gray-white differentiation to suggest acute or subacute transcortical infarct. Remote lacunar infarcts versus prominent perivascular spaces inferior left basal ganglia, as before.  Generalized pattern age-related parenchymal volume loss.  Nonspecific areas of white matter hypoattenuation may relate to sequela of chronic small vessel ischemic  disease.    Ventricles/Extra-axial spaces: No abnormal extra-axial fluid collection. Basal cisterns are patent.    Vessels: Mild-to-moderate atherosclerotic calcification.    Orbits: See below.    Scalp: Unremarkable.    Skull: There are no depressed skull fractures or destructive bone lesions.    Sinuses and mastoids: See below.    Other findings: None    Facial bones:    Acute facial fractures: Recent appearing mild displaced nasal bone fracture, with mild leftward deviation of the nasal arch, new since head CT 04/01/2023.  Overlying soft tissue swelling is noted without definite radiopaque foreign body.  Nasal septum appears intact.  Nasolacrimal ducts appear uninvolved.    No definite additional recent appearing facial bone fracture seen.    Pterygoid plates, Zygomatic arches, Sphenotemporal buttresses: Intact.    Nasal Bones: As above.    Mandible: The mandible is intact.  Leftward asymmetric TMJ DJD.    Dentition: No tooth fracture. No periapical lucencies.    Sinuses: There are no fluid levels in the sinuses.    Imaged mastoids and middle ears: Partial opacification of the dependent right mastoid air cells, possibly on the basis of mucosal thickening and or effusion.    Imaged upper cervical spine: See below.    Facial soft tissues: See above.    Orbits: The bony orbits and orbital contents are atraumatic.    Imaged intracranial structures and upper aerodigestive tract: Unremarkable.    Cervical spine:    Comment: Motion compromised examination.    Fractures: No acute fractures    Alignment: Straightening of anticipated cervical lordosis.  Grade 1 anterolisthesis of C4 on C5.  Atlanto-axial and atlanto-occipital joints: Atlanto-axial and atlanto-occipital intervals are not widened.  Facet joints: There is no traumatic facet joint widening.Degenerative facet arthropathy  Vertebral bodies: Heterogeneous marrow attenuation.  Appearance relatively similar to osseous structures seen on abdomen/pelvis CT 04/26/2023  and chest, abdomen, and pelvis CT 03/13/2023.  Discs: Degenerative disc osteophyte complex formation.  Spinal canal and foraminal narrowing: Although CT does not optimally evaluate the soft tissue contents of the spinal canal and foramina, no critical stenosis is suggested.    At C5-6, suspect moderate left foraminal narrowing      Paraspinal soft tissues: Extensive cervical and supraclavicular adenopathy.    Upper Lungs:Clear                                       CT Cervical Spine Without Contrast (Final result)  Result time 06/16/23 11:06:29      Final result by Kevin Miller MD (06/16/23 11:06:29)                   Impression:      1. No acute intracranial findings.  2. Recent appearing mild displaced left nasal bone fracture.  No additional recent appearing for facial bone fractures identified.  3. No acute cervical spine fracture.  4. Extensive cervical and supraclavicular adenopathy.  Continued attention on follow-up recommended.  5. Additional details, as provided in the body of report.      Electronically signed by: Kevin Miller  Date:    06/16/2023  Time:    11:06               Narrative:    EXAMINATION:  CT HEAD WITHOUT CONTRAST; CT MAXILLOFACIAL WITHOUT CONTRAST; CT CERVICAL SPINE WITHOUT CONTRAST    CLINICAL HISTORY:  Head trauma, minor (Age >= 65y);; Facial trauma, blunt;; Neck trauma (Age >= 65y);    TECHNIQUE:  Low dose axial images were obtained through the head, cervical spine, and facial bones.  Coronal and sagittal reformations were also performed. Contrast was not administered.    COMPARISON:  CT head 05/15/2023.    FINDINGS:  Head:    Blood: No acute intracranial hemorrhage.    Parenchyma: No definite loss of gray-white differentiation to suggest acute or subacute transcortical infarct. Remote lacunar infarcts versus prominent perivascular spaces inferior left basal ganglia, as before.  Generalized pattern age-related parenchymal volume loss.  Nonspecific areas of white matter  hypoattenuation may relate to sequela of chronic small vessel ischemic disease.    Ventricles/Extra-axial spaces: No abnormal extra-axial fluid collection. Basal cisterns are patent.    Vessels: Mild-to-moderate atherosclerotic calcification.    Orbits: See below.    Scalp: Unremarkable.    Skull: There are no depressed skull fractures or destructive bone lesions.    Sinuses and mastoids: See below.    Other findings: None    Facial bones:    Acute facial fractures: Recent appearing mild displaced nasal bone fracture, with mild leftward deviation of the nasal arch, new since head CT 04/01/2023.  Overlying soft tissue swelling is noted without definite radiopaque foreign body.  Nasal septum appears intact.  Nasolacrimal ducts appear uninvolved.    No definite additional recent appearing facial bone fracture seen.    Pterygoid plates, Zygomatic arches, Sphenotemporal buttresses: Intact.    Nasal Bones: As above.    Mandible: The mandible is intact.  Leftward asymmetric TMJ DJD.    Dentition: No tooth fracture. No periapical lucencies.    Sinuses: There are no fluid levels in the sinuses.    Imaged mastoids and middle ears: Partial opacification of the dependent right mastoid air cells, possibly on the basis of mucosal thickening and or effusion.    Imaged upper cervical spine: See below.    Facial soft tissues: See above.    Orbits: The bony orbits and orbital contents are atraumatic.    Imaged intracranial structures and upper aerodigestive tract: Unremarkable.    Cervical spine:    Comment: Motion compromised examination.    Fractures: No acute fractures    Alignment: Straightening of anticipated cervical lordosis.  Grade 1 anterolisthesis of C4 on C5.  Atlanto-axial and atlanto-occipital joints: Atlanto-axial and atlanto-occipital intervals are not widened.  Facet joints: There is no traumatic facet joint widening.Degenerative facet arthropathy  Vertebral bodies: Heterogeneous marrow attenuation.  Appearance  relatively similar to osseous structures seen on abdomen/pelvis CT 04/26/2023 and chest, abdomen, and pelvis CT 03/13/2023.  Discs: Degenerative disc osteophyte complex formation.  Spinal canal and foraminal narrowing: Although CT does not optimally evaluate the soft tissue contents of the spinal canal and foramina, no critical stenosis is suggested.    At C5-6, suspect moderate left foraminal narrowing      Paraspinal soft tissues: Extensive cervical and supraclavicular adenopathy.    Upper Lungs:Clear

## 2023-06-18 NOTE — CONSULTS
CONSULTATION LIAISON PSYCHIATRY INITIAL EVALUATION    Patient Name: Earl Abdul  MRN: [id number]  Patient Class: OP- Observation  Admission Date: 6/16/2023  Attending Physician: Andrei Sosa MD      HPI:   Earl Abdul is a 63 y.o. female with past psychiatric history of tobacco abuse, alcohol abuse with seizures and complicated withdrawal, and depression with SA in 2017 & past pertinent medical history of breast cancer, HTN, HLD, fibromyalgia, sarcoidosis, hypothyroidism, T2DM, CLL (not on treatment) and COPD  presents to the ED/admitted to the hospital for Alcohol withdrawal syndrome with complication.  Utox + for benzo (prescribed Librium 6/6 per ),  EtOH 215, PETH pending.     Psychiatry consulted for management of alcohol withdrawal, PEC'd for SI.    On psych exam, patient reports that she drinks 750 mL of vodka daily since April.  She reports numerous stressors including death of son, leading to anxiety, depression, and alcohol abuse. Numerous inpatient rehab stays, IOPs, AA, and MAT, currently on Naltrexone. Was hospitalized for alcohol withdrawal in May 2023, relapsed two weeks after. Denies legal Hx of OD due to alcohol use.  Wants to detox, ambivalent about resources for cessation at this point in time.  Started on Diazepam 10 mg TID by primary.   VS wnl with exception of HTN to 175/90.  Notably anxious and tremulous, no N/V, AVH.   Required Ativan 2 mg IV @ 1129 and 2011.     During admission, patient endorsed SI, was PEC'd in ED.  Currently prescribed Trazodone 30 mg PO nightly, Cymbalta 90 mg PO daily, and Naltrexone 50 mg PO daily. Adherent with medications. She reports poor sleep, normal appetite.  Poor energy and a-motivation. Denies hopelessness, or SI/plan/intent. Denies any thoughts of SI, states that she said this only because she was intoxicated.  Future-oriented.  No access to weapons.  Denies symptoms suggestive of gabe or psychosis.  Denies AVH.  Reports marijuana use but  "denies other recreational substance use.     5/15/2023: Hospitalized for DEVANTE, addiction psychiatry consulted for alcohol withdrawal management. [facility] at this time.    Collateral with patient's permission:    present in room.    Medical Review of Systems:  Tremors, otherwise comprehensive review of systems was negative     Psychiatric Review of Systems (is patient experiencing or having changes in):  sleep: yes  appetite: no  weight: no  energy/anergy: yes  interest/pleasure/anhedonia: yes  somatic symptoms: no  libido: did not assess  anxiety/panic: yes  guilty/hopelessness: yes  concentration: yes  Ursula:no  Psychosis: no  Trauma: no  S.I.B.s/risky behavior: no    Past Psychiatric History:  Previous Medication Trials: yes  Previous Psychiatric Hospitalizations:no   Previous Suicide Attempts: yes  History of Violence: no  Outpatient Psychiatrist: no  Family Psychiatric History: unknown    Substance Abuse History (with emphasis over the last 12 months):  Recreational Drugs:  marijuana  Use of Alcohol: heavy  Tobacco Use:yes  Rehab History:yes    Social History:  Marital Status:   Children: yes  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  Education:  unknown  Special Ed: unknown  Housing Status: with spouse  Access to gun: no  Psychosocial Stressors: death of son  Functioning Relationships: good support system    Legal History:  Past Charges/Incarcerations: no  Pending charges:no    Mental Status Exam:  General Appearance:  appears stated age  Behavior: cooperative  Involuntary Movements and Motor Activity: +tremors  Gait and Station: unable to assess - patient lying down or seated  Speech and Language: intact; normal rate, rhythm, volume, tone, and pitch; conversational, spontaneous, and coherent; speaks and understands English proficiently and fluently; repeats words and phrases, no word finding difficulties are noted  Mood: "fine"  Affect: euthymic  Thought Process and " Associations: intact; linear, goal-directed, organized, and logical; no loosening of associations noted  Thought Content and Perceptions:: no suicidal or homicidal ideation, no auditory or visual hallucinations, no paranoid ideation, no ideas of reference, no evidence of delusions or psychosis  Sensorium and Orientation: intact; alert with clear sensorium; oriented fully to person, place, time and situation  Recent and Remote Memory: grossly intact, able to recall relevant and salient information from the recent and remote past  Attention and Concentration: grossly intact, attentive to the conversation and not readily distractible  Fund of Knowledge: grossly intact, used appropriate vocabulary and demonstrated an awareness of current events, consistent with educational level achieved  Insight: intact, demonstrates awareness of illness and situation  Judgment: intact, behavior is adequate/appropriate to the circumstances, compliant with health provider's recommendations and instructions    CAM ICU positive? no      ASSESSMENT & RECOMMENDATIONS   Alcohol use disorder, severe  Tobacco use disorder  Major depressive disorder, recurrent  Unspecified anxiety disorder        Continue Trazodone 300 mg PO nightly  Decrease Cymbalta to 60 mg PO daily.  Monitor platelets, will consider initiating Prozac.   Stop Naltrexone  Continue Diazepam 5 mg TID, low threshold to increase to q6h  PRN Ativan 2 mg PO q4h PRN CIWA > 8, or SBP > 160, DBP > 100, or HR > 110 (meeting 2 out of 3 vital sign criteria).  Monitor EKG for Qtc     RISK ASSESSMENT  NEEDS PEC because patient is in imminent danger of hurting self or others and is gravely disabled. & NEEDS 1:1 sitter.  Will reassess tomorrow.     FOLLOW UP  Will follow up while in house      DISPOSITION - once medically cleared:     OR  Defer to medical team. PEC continued out of abundance of caution, will reassess tomorrow.     Please contact ON CALL psychiatry service (24/7) for any  acute issues that may arise.    Dr. Sarah Mckay  CL Psychiatry  [facility]  6/17/2023 8:34 PM        --------------------------------------------------------------------------------------------------------------------------------------------------------------------------------------------------------------------------------------    CONTINUED HISTORY & OBJECTIVE clinical data & findings reviewed and noted for above decision making    Past Medical/Surgical History:   Past Medical History:   Diagnosis Date    Anemia     Anemia     Controlled type 2 diabetes mellitus without complication, without long-term current use of insulin 11/30/2021    COPD (chronic obstructive pulmonary disease)     Depression     Diverticulitis     Fatty liver     GERD (gastroesophageal reflux disease)     Hyperlipidemia     Hypertension     Pancreatitis     Peptic ulcer disease     Polysubstance abuse     Posterior reversible encephalopathy syndrome     Sarcoidosis of lung     Sarcoidosis of lung     over 30 yrs ago    Seizures     7/2017    Suicide attempt     Suicide ideation      Past Surgical History:   Procedure Laterality Date    APPENDECTOMY      BILATERAL MASTECTOMY Bilateral 10/29/2020    Procedure: MASTECTOMY, BILATERAL;  Surgeon: Baylee Kevin MD;  Location: [address];  Service: General;  Laterality: Bilateral;    BREAST REVISION SURGERY Bilateral 2/11/2021    Procedure: BREAST REVISION SURGERY;  Surgeon: Scottie Johnson MD;  Location: [address];  Service: Plastics;  Laterality: Bilateral;    COLONOSCOPY N/A 7/28/2017    Procedure: COLONOSCOPY;  Surgeon: Aaron Alvarado MD;  Location: [facility];  Service: Endoscopy;  Laterality: N/A;    ESOPHAGOGASTRODUODENOSCOPY  10/7/2016, 11/6/2014    2016 - gastritis, duodenitis, 2014 erosive gastritis    ESOPHAGOGASTRODUODENOSCOPY N/A 2/11/2020    Procedure: ESOPHAGOGASTRODUODENOSCOPY (EGD);  Surgeon: Fawn Garrido MD;  Location: [facility];   Service: Endoscopy;  Laterality: N/A;    ESOPHAGOGASTRODUODENOSCOPY N/A 4/19/2021    Procedure: EGD (ESOPHAGOGASTRODUODENOSCOPY);  Surgeon: Paramjit Martino MD;  Location: [facility] ENDO;  Service: Endoscopy;  Laterality: N/A;    FLEXIBLE SIGMOIDOSCOPY  11/06/2014    colitis    HYSTERECTOMY      IMPLANTATION OF PERMANENT SACRAL NERVE STIMULATOR N/A 7/12/2022    Procedure: INSERTION, NEUROSTIMULATOR, PERMANENT, SACRAL;  Surgeon: Juaquin Edwards MD;  Location: [address];  Service: Urology;  Laterality: N/A;  1hr    INJECTION FOR SENTINEL NODE IDENTIFICATION Right 10/29/2020    Procedure: INJECTION, FOR SENTINEL NODE IDENTIFICATION;  Surgeon: Baylee Kevin MD;  Location: [address];  Service: General;  Laterality: Right;    INJECTION OF JOINT Right 10/10/2019    Procedure: Injection, Joint RIGHT ILIOPSOAS BURSA/TENDON INJECTION AND RIGHT GLUTEAL TENDON INJECTION WITH STEROID AND LIDOCAINE;  Surgeon: Guillaume Rico MD;  Location: [facility]IN [facility]GT;  Service: Pain Management;  Laterality: Right;  NEEDS CONSENT    INSERTION OF BREAST TISSUE EXPANDER Bilateral 10/29/2020    Procedure: INSERTION, TISSUE EXPANDER, BREAST;  Surgeon: Scottie Johnson MD;  Location: [address];  Service: Plastics;  Laterality: Bilateral;  Right breast: 1082 g  Left breast: 1076 g    LIPOSUCTION Bilateral 2/11/2021    Procedure: LIPOSUCTION;  Surgeon: Scottie Johnson MD;  Location: [address];  Service: Plastics;  Laterality: Bilateral;    mediastenoscopy      REPLACEMENT OF IMPLANT OF BREAST Bilateral 2/11/2021    Procedure: REPLACEMENT, IMPLANT, BREAST;  Surgeon: Scottie Johnson MD;  Location: [address];  Service: Plastics;  Laterality: Bilateral;    SENTINEL LYMPH NODE BIOPSY Right 10/29/2020    Procedure: BIOPSY, LYMPH NODE, SENTINEL;  Surgeon: Baylee Kevin MD;  Location: [address];  Service: General;  Laterality: Right;    TONSILLECTOMY N/A 1970    TUBAL LIGATION         Current  Medications:   Scheduled Meds:    diazePAM  10 mg Oral Q8H    DULoxetine  90 mg Oral Daily    fluticasone furoate-vilanteroL  1 puff Inhalation Daily    folic acid  1 mg Oral Daily    heparin (porcine)  5,000 Units Subcutaneous Q8H    levothyroxine  50 mcg Oral Before breakfast    lisinopriL  20 mg Oral Daily    multivitamin  1 tablet Oral Daily    mupirocin   Topical (Top) BID    naltrexone  50 mg Oral Daily    pantoprazole  40 mg Oral Daily    thiamine  100 mg Oral Daily    traZODone  300 mg Oral QHS     PRN Meds: acetaminophen, albuterol, dextrose 10%, dextrose 10%, dextrose, dextrose, dicyclomine, glucagon (human recombinant), ibuprofen, insulin aspart U-100, loperamide, lorazepam, melatonin, naloxone, ondansetron, polyethylene glycol, prochlorperazine, sodium chloride 0.9%      Allergies:   Review of patient's allergies indicates:   Allergen Reactions    Lortab [hydrocodone-acetaminophen] Itching    Promethazine Itching and Other (See Comments)       Vitals  Vitals:    06/17/23 1950   BP: (!) 165/90   Pulse:    Resp:    Temp:        Labs/Imaging/Studies:  Recent Results (from the past 24 hour(s))   POCT glucose    Collection Time: 06/16/23  8:41 PM   Result Value Ref Range    POCT Glucose 75 70 - 110 mg/dL   Osmolality, urine    Collection Time: 06/17/23 12:35 AM   Result Value Ref Range    Osmolality, Urine 193 50 - 1200 mOsm/kg   Sodium, urine, random    Collection Time: 06/17/23 12:35 AM   Result Value Ref Range    Sodium, Urine 37 20 - 250 mmol/L   Chloride, urine, random    Collection Time: 06/17/23 12:35 AM   Result Value Ref Range    Chloride, Urine 41 25 - 200 mmol/L   Basic metabolic panel    Collection Time: 06/17/23  4:07 AM   Result Value Ref Range    Sodium 142 136 - 145 mmol/L    Potassium 3.1 (L) 3.5 - 5.1 mmol/L    Chloride 103 95 - 110 mmol/L    CO2 27 23 - 29 mmol/L    Glucose 82 70 - 110 mg/dL    BUN 6 (L) 8 - 23 mg/dL    Creatinine 0.6 0.5 - 1.4 mg/dL    Calcium 8.2 (L) 8.7 - 10.5 mg/dL     Anion Gap 12 8 - 16 mmol/L    eGFR >60.0 >60 mL/min/1.73 m^2   CBC auto differential    Collection Time: 06/17/23  4:07 AM   Result Value Ref Range    WBC 4.66 3.90 - 12.70 K/uL    RBC 3.42 (L) 4.00 - 5.40 M/uL    Hemoglobin 9.8 (L) 12.0 - 16.0 g/dL    Hematocrit 30.6 (L) 37.0 - 48.5 %    MCV 90 82 - 98 fL    MCH 28.7 27.0 - 31.0 pg    MCHC 32.0 32.0 - 36.0 g/dL    RDW 16.8 (H) 11.5 - 14.5 %    Platelets 74 (L) 150 - 450 K/uL    MPV 10.8 9.2 - 12.9 fL    Immature Granulocytes 0.2 0.0 - 0.5 %    Gran # (ANC) 1.2 (L) 1.8 - 7.7 K/uL    Immature Grans (Abs) 0.01 0.00 - 0.04 K/uL    Lymph # 3.1 1.0 - 4.8 K/uL    Mono # 0.3 0.3 - 1.0 K/uL    Eos # 0.1 0.0 - 0.5 K/uL    Baso # 0.01 0.00 - 0.20 K/uL    nRBC 0 0 /100 WBC    Gran % 25.6 (L) 38.0 - 73.0 %    Lymph % 66.5 (H) 18.0 - 48.0 %    Mono % 6.4 4.0 - 15.0 %    Eosinophil % 1.1 0.0 - 8.0 %    Basophil % 0.2 0.0 - 1.9 %    Differential Method Automated    Magnesium    Collection Time: 06/17/23  4:07 AM   Result Value Ref Range    Magnesium 1.3 (L) 1.6 - 2.6 mg/dL   Phosphorus    Collection Time: 06/17/23  4:07 AM   Result Value Ref Range    Phosphorus 3.2 2.7 - 4.5 mg/dL   Echo    Collection Time: 06/17/23 11:45 AM   Result Value Ref Range    BSA 1.86 m2    TDI SEPTAL 0.07 m/s    LV LATERAL E/E' RATIO 7.22 m/s    LV SEPTAL E/E' RATIO 9.29 m/s    LA WIDTH 3.54 cm    TDI LATERAL 0.09 m/s    LVIDd 4.80 3.5 - 6.0 cm    IVS 0.72 0.6 - 1.1 cm    Posterior Wall 0.94 0.6 - 1.1 cm    LVIDs 2.96 2.1 - 4.0 cm    FS 38 28 - 44 %    LA volume 56.30 cm3    Sinus 2.97 cm    STJ 2.90 cm    Ascending aorta 2.57 cm    LV mass 132.88 g    LA size 3.26 cm    RVDD 3.72 cm    TAPSE 2.46 cm    Left Ventricle Relative Wall Thickness 0.39 cm    AV mean gradient 4 mmHg    AV valve area 2.04 cm2    AV Velocity Ratio 0.56     AV index (prosthetic) 0.67     MV valve area p 1/2 method 2.12 cm2    E/A ratio 0.61     Mean e' 0.08 m/s    E wave deceleration time 357.31 msec    LVOT diameter 1.97 cm     LVOT area 3.0 cm2    LVOT peak frank 0.74 m/s    LVOT peak VTI 18.05 cm    Ao peak frank 1.33 m/s    Ao VTI 26.89 cm    LVOT stroke volume 54.99 cm3    AV peak gradient 7 mmHg    E/E' ratio 8.13 m/s    MV Peak E Frank 0.65 m/s    MV stenosis pressure 1/2 time 103.62 ms    MV Peak A Frank 1.06 m/s    LV Systolic Volume 33.75 mL    LV Systolic Volume Index 18.4 mL/m2    LV Diastolic Volume 107.42 mL    LV Diastolic Volume Index 58.70 mL/m2    LA Volume Index 30.8 mL/m2    LV Mass Index 73 g/m2    RA Major Axis 5.17 cm    Left Atrium Minor Axis 5.46 cm    Left Atrium Major Axis 6.05 cm    RA Width 3.55 cm   POCT glucose    Collection Time: 06/17/23  4:31 PM   Result Value Ref Range    POCT Glucose 150 (H) 70 - 110 mg/dL   POCT glucose    Collection Time: 06/17/23  8:15 PM   Result Value Ref Range    POCT Glucose 142 (H) 70 - 110 mg/dL     Imaging Results              X-Ray Chest AP Portable (Final result)  Result time 06/16/23 11:43:25      Final result by Luis Holder MD (06/16/23 11:43:25)                   Impression:      See above      Electronically signed by: Luis Holder MD  Date:    06/16/2023  Time:    11:43               Narrative:    EXAMINATION:  XR CHEST AP PORTABLE    CLINICAL HISTORY:  Shortness of breath    TECHNIQUE:  Single frontal view of the chest was performed.    COMPARISON:  N 05/15/2023 one    FINDINGS:  Heart size normal.  The lungs are clear.  No pleural effusion.  Surgical changes in the breast identified similar to the previous study                                       CT Head Without Contrast (Final result)  Result time 06/16/23 11:06:29      Final result by Kevin Miller MD (06/16/23 11:06:29)                   Impression:      1. No acute intracranial findings.  2. Recent appearing mild displaced left nasal bone fracture.  No additional recent appearing for facial bone fractures identified.  3. No acute cervical spine fracture.  4. Extensive cervical and supraclavicular adenopathy.   Continued attention on follow-up recommended.  5. Additional details, as provided in the body of report.      Electronically signed by: Kevin Miller  Date:    06/16/2023  Time:    11:06               Narrative:    EXAMINATION:  CT HEAD WITHOUT CONTRAST; CT MAXILLOFACIAL WITHOUT CONTRAST; CT CERVICAL SPINE WITHOUT CONTRAST    CLINICAL HISTORY:  Head trauma, minor (Age >= 65y);; Facial trauma, blunt;; Neck trauma (Age >= 65y);    TECHNIQUE:  Low dose axial images were obtained through the head, cervical spine, and facial bones.  Coronal and sagittal reformations were also performed. Contrast was not administered.    COMPARISON:  CT head 05/15/2023.    FINDINGS:  Head:    Blood: No acute intracranial hemorrhage.    Parenchyma: No definite loss of gray-white differentiation to suggest acute or subacute transcortical infarct. Remote lacunar infarcts versus prominent perivascular spaces inferior left basal ganglia, as before.  Generalized pattern age-related parenchymal volume loss.  Nonspecific areas of white matter hypoattenuation may relate to sequela of chronic small vessel ischemic disease.    Ventricles/Extra-axial spaces: No abnormal extra-axial fluid collection. Basal cisterns are patent.    Vessels: Mild-to-moderate atherosclerotic calcification.    Orbits: See below.    Scalp: Unremarkable.    Skull: There are no depressed skull fractures or destructive bone lesions.    Sinuses and mastoids: See below.    Other findings: None    Facial bones:    Acute facial fractures: Recent appearing mild displaced nasal bone fracture, with mild leftward deviation of the nasal arch, new since head CT 04/01/2023.  Overlying soft tissue swelling is noted without definite radiopaque foreign body.  Nasal septum appears intact.  Nasolacrimal ducts appear uninvolved.    No definite additional recent appearing facial bone fracture seen.    Pterygoid plates, Zygomatic arches, Sphenotemporal buttresses: Intact.    Nasal Bones: As  above.    Mandible: The mandible is intact.  Leftward asymmetric TMJ DJD.    Dentition: No tooth fracture. No periapical lucencies.    Sinuses: There are no fluid levels in the sinuses.    Imaged mastoids and middle ears: Partial opacification of the dependent right mastoid air cells, possibly on the basis of mucosal thickening and or effusion.    Imaged upper cervical spine: See below.    Facial soft tissues: See above.    Orbits: The bony orbits and orbital contents are atraumatic.    Imaged intracranial structures and upper aerodigestive tract: Unremarkable.    Cervical spine:    Comment: Motion compromised examination.    Fractures: No acute fractures    Alignment: Straightening of anticipated cervical lordosis.  Grade 1 anterolisthesis of C4 on C5.  Atlanto-axial and atlanto-occipital joints: Atlanto-axial and atlanto-occipital intervals are not widened.  Facet joints: There is no traumatic facet joint widening.Degenerative facet arthropathy  Vertebral bodies: Heterogeneous marrow attenuation.  Appearance relatively similar to osseous structures seen on abdomen/pelvis CT 04/26/2023 and chest, abdomen, and pelvis CT 03/13/2023.  Discs: Degenerative disc osteophyte complex formation.  Spinal canal and foraminal narrowing: Although CT does not optimally evaluate the soft tissue contents of the spinal canal and foramina, no critical stenosis is suggested.    At C5-6, suspect moderate left foraminal narrowing      Paraspinal soft tissues: Extensive cervical and supraclavicular adenopathy.    Upper Lungs:Clear                                       CT Maxillofacial Without Contrast (Final result)  Result time 06/16/23 11:06:29      Final result by Kevin Miller MD (06/16/23 11:06:29)                   Impression:      1. No acute intracranial findings.  2. Recent appearing mild displaced left nasal bone fracture.  No additional recent appearing for facial bone fractures identified.  3. No acute cervical spine  fracture.  4. Extensive cervical and supraclavicular adenopathy.  Continued attention on follow-up recommended.  5. Additional details, as provided in the body of report.      Electronically signed by: Kevin Miller  Date:    06/16/2023  Time:    11:06               Narrative:    EXAMINATION:  CT HEAD WITHOUT CONTRAST; CT MAXILLOFACIAL WITHOUT CONTRAST; CT CERVICAL SPINE WITHOUT CONTRAST    CLINICAL HISTORY:  Head trauma, minor (Age >= 65y);; Facial trauma, blunt;; Neck trauma (Age >= 65y);    TECHNIQUE:  Low dose axial images were obtained through the head, cervical spine, and facial bones.  Coronal and sagittal reformations were also performed. Contrast was not administered.    COMPARISON:  CT head 05/15/2023.    FINDINGS:  Head:    Blood: No acute intracranial hemorrhage.    Parenchyma: No definite loss of gray-white differentiation to suggest acute or subacute transcortical infarct. Remote lacunar infarcts versus prominent perivascular spaces inferior left basal ganglia, as before.  Generalized pattern age-related parenchymal volume loss.  Nonspecific areas of white matter hypoattenuation may relate to sequela of chronic small vessel ischemic disease.    Ventricles/Extra-axial spaces: No abnormal extra-axial fluid collection. Basal cisterns are patent.    Vessels: Mild-to-moderate atherosclerotic calcification.    Orbits: See below.    Scalp: Unremarkable.    Skull: There are no depressed skull fractures or destructive bone lesions.    Sinuses and mastoids: See below.    Other findings: None    Facial bones:    Acute facial fractures: Recent appearing mild displaced nasal bone fracture, with mild leftward deviation of the nasal arch, new since head CT 04/01/2023.  Overlying soft tissue swelling is noted without definite radiopaque foreign body.  Nasal septum appears intact.  Nasolacrimal ducts appear uninvolved.    No definite additional recent appearing facial bone fracture seen.    Pterygoid plates, Zygomatic  arches, Sphenotemporal buttresses: Intact.    Nasal Bones: As above.    Mandible: The mandible is intact.  Leftward asymmetric TMJ DJD.    Dentition: No tooth fracture. No periapical lucencies.    Sinuses: There are no fluid levels in the sinuses.    Imaged mastoids and middle ears: Partial opacification of the dependent right mastoid air cells, possibly on the basis of mucosal thickening and or effusion.    Imaged upper cervical spine: See below.    Facial soft tissues: See above.    Orbits: The bony orbits and orbital contents are atraumatic.    Imaged intracranial structures and upper aerodigestive tract: Unremarkable.    Cervical spine:    Comment: Motion compromised examination.    Fractures: No acute fractures    Alignment: Straightening of anticipated cervical lordosis.  Grade 1 anterolisthesis of C4 on C5.  Atlanto-axial and atlanto-occipital joints: Atlanto-axial and atlanto-occipital intervals are not widened.  Facet joints: There is no traumatic facet joint widening.Degenerative facet arthropathy  Vertebral bodies: Heterogeneous marrow attenuation.  Appearance relatively similar to osseous structures seen on abdomen/pelvis CT 04/26/2023 and chest, abdomen, and pelvis CT 03/13/2023.  Discs: Degenerative disc osteophyte complex formation.  Spinal canal and foraminal narrowing: Although CT does not optimally evaluate the soft tissue contents of the spinal canal and foramina, no critical stenosis is suggested.    At C5-6, suspect moderate left foraminal narrowing      Paraspinal soft tissues: Extensive cervical and supraclavicular adenopathy.    Upper Lungs:Clear                                       CT Cervical Spine Without Contrast (Final result)  Result time 06/16/23 11:06:29      Final result by Kevin Miller MD (06/16/23 11:06:29)                   Impression:      1. No acute intracranial findings.  2. Recent appearing mild displaced left nasal bone fracture.  No additional recent appearing for  facial bone fractures identified.  3. No acute cervical spine fracture.  4. Extensive cervical and supraclavicular adenopathy.  Continued attention on follow-up recommended.  5. Additional details, as provided in the body of report.      Electronically signed by: Kevin Miller  Date:    06/16/2023  Time:    11:06               Narrative:    EXAMINATION:  CT HEAD WITHOUT CONTRAST; CT MAXILLOFACIAL WITHOUT CONTRAST; CT CERVICAL SPINE WITHOUT CONTRAST    CLINICAL HISTORY:  Head trauma, minor (Age >= 65y);; Facial trauma, blunt;; Neck trauma (Age >= 65y);    TECHNIQUE:  Low dose axial images were obtained through the head, cervical spine, and facial bones.  Coronal and sagittal reformations were also performed. Contrast was not administered.    COMPARISON:  CT head 05/15/2023.    FINDINGS:  Head:    Blood: No acute intracranial hemorrhage.    Parenchyma: No definite loss of gray-white differentiation to suggest acute or subacute transcortical infarct. Remote lacunar infarcts versus prominent perivascular spaces inferior left basal ganglia, as before.  Generalized pattern age-related parenchymal volume loss.  Nonspecific areas of white matter hypoattenuation may relate to sequela of chronic small vessel ischemic disease.    Ventricles/Extra-axial spaces: No abnormal extra-axial fluid collection. Basal cisterns are patent.    Vessels: Mild-to-moderate atherosclerotic calcification.    Orbits: See below.    Scalp: Unremarkable.    Skull: There are no depressed skull fractures or destructive bone lesions.    Sinuses and mastoids: See below.    Other findings: None    Facial bones:    Acute facial fractures: Recent appearing mild displaced nasal bone fracture, with mild leftward deviation of the nasal arch, new since head CT 04/01/2023.  Overlying soft tissue swelling is noted without definite radiopaque foreign body.  Nasal septum appears intact.  Nasolacrimal ducts appear uninvolved.    No definite additional recent  appearing facial bone fracture seen.    Pterygoid plates, Zygomatic arches, Sphenotemporal buttresses: Intact.    Nasal Bones: As above.    Mandible: The mandible is intact.  Leftward asymmetric TMJ DJD.    Dentition: No tooth fracture. No periapical lucencies.    Sinuses: There are no fluid levels in the sinuses.    Imaged mastoids and middle ears: Partial opacification of the dependent right mastoid air cells, possibly on the basis of mucosal thickening and or effusion.    Imaged upper cervical spine: See below.    Facial soft tissues: See above.    Orbits: The bony orbits and orbital contents are atraumatic.    Imaged intracranial structures and upper aerodigestive tract: Unremarkable.    Cervical spine:    Comment: Motion compromised examination.    Fractures: No acute fractures    Alignment: Straightening of anticipated cervical lordosis.  Grade 1 anterolisthesis of C4 on C5.  Atlanto-axial and atlanto-occipital joints: Atlanto-axial and atlanto-occipital intervals are not widened.  Facet joints: There is no traumatic facet joint widening.Degenerative facet arthropathy  Vertebral bodies: Heterogeneous marrow attenuation.  Appearance relatively similar to osseous structures seen on abdomen/pelvis CT 04/26/2023 and chest, abdomen, and pelvis CT 03/13/2023.  Discs: Degenerative disc osteophyte complex formation.  Spinal canal and foraminal narrowing: Although CT does not optimally evaluate the soft tissue contents of the spinal canal and foramina, no critical stenosis is suggested.    At C5-6, suspect moderate left foraminal narrowing      Paraspinal soft tissues: Extensive cervical and supraclavicular adenopathy.    Upper Lungs:Clear

## 2023-06-19 LAB
ANION GAP SERPL CALC-SCNC: 10 MMOL/L (ref 8–16)
ANISOCYTOSIS BLD QL SMEAR: ABNORMAL
BASOPHILS # BLD AUTO: 0.01 K/UL (ref 0–0.2)
BASOPHILS NFR BLD: 0.3 % (ref 0–1.9)
BUN SERPL-MCNC: 5 MG/DL (ref 8–23)
CALCIUM SERPL-MCNC: 8.6 MG/DL (ref 8.7–10.5)
CHLORIDE SERPL-SCNC: 109 MMOL/L (ref 95–110)
CO2 SERPL-SCNC: 21 MMOL/L (ref 23–29)
CREAT SERPL-MCNC: 0.7 MG/DL (ref 0.5–1.4)
DIFFERENTIAL METHOD: ABNORMAL
EOSINOPHIL # BLD AUTO: 0.1 K/UL (ref 0–0.5)
EOSINOPHIL NFR BLD: 1.6 % (ref 0–8)
ERYTHROCYTE [DISTWIDTH] IN BLOOD BY AUTOMATED COUNT: 17.2 % (ref 11.5–14.5)
EST. GFR  (NO RACE VARIABLE): >60 ML/MIN/1.73 M^2
GLUCOSE SERPL-MCNC: 125 MG/DL (ref 70–110)
HCT VFR BLD AUTO: 31.6 % (ref 37–48.5)
HGB BLD-MCNC: 9.9 G/DL (ref 12–16)
IMM GRANULOCYTES # BLD AUTO: 0.01 K/UL (ref 0–0.04)
IMM GRANULOCYTES NFR BLD AUTO: 0.3 % (ref 0–0.5)
LYMPHOCYTES # BLD AUTO: 2.6 K/UL (ref 1–4.8)
LYMPHOCYTES NFR BLD: 67.8 % (ref 18–48)
MAGNESIUM SERPL-MCNC: 1.6 MG/DL (ref 1.6–2.6)
MCH RBC QN AUTO: 28.5 PG (ref 27–31)
MCHC RBC AUTO-ENTMCNC: 31.3 G/DL (ref 32–36)
MCV RBC AUTO: 91 FL (ref 82–98)
MONOCYTES # BLD AUTO: 0.3 K/UL (ref 0.3–1)
MONOCYTES NFR BLD: 6.9 % (ref 4–15)
NEUTROPHILS # BLD AUTO: 0.9 K/UL (ref 1.8–7.7)
NEUTROPHILS NFR BLD: 23.1 % (ref 38–73)
NRBC BLD-RTO: 0 /100 WBC
OVALOCYTES BLD QL SMEAR: ABNORMAL
PHOSPHATE SERPL-MCNC: 4.3 MG/DL (ref 2.7–4.5)
PLATELET # BLD AUTO: 87 K/UL (ref 150–450)
PMV BLD AUTO: 11.4 FL (ref 9.2–12.9)
POCT GLUCOSE: 111 MG/DL (ref 70–110)
POCT GLUCOSE: 119 MG/DL (ref 70–110)
POCT GLUCOSE: 130 MG/DL (ref 70–110)
POCT GLUCOSE: 155 MG/DL (ref 70–110)
POIKILOCYTOSIS BLD QL SMEAR: SLIGHT
POLYCHROMASIA BLD QL SMEAR: ABNORMAL
POTASSIUM SERPL-SCNC: 4 MMOL/L (ref 3.5–5.1)
RBC # BLD AUTO: 3.47 M/UL (ref 4–5.4)
SMUDGE CELLS BLD QL SMEAR: PRESENT
SODIUM SERPL-SCNC: 140 MMOL/L (ref 136–145)
WBC # BLD AUTO: 3.76 K/UL (ref 3.9–12.7)

## 2023-06-19 PROCEDURE — 83735 ASSAY OF MAGNESIUM: CPT | Performed by: INTERNAL MEDICINE

## 2023-06-19 PROCEDURE — 94761 N-INVAS EAR/PLS OXIMETRY MLT: CPT

## 2023-06-19 PROCEDURE — 85025 COMPLETE CBC W/AUTO DIFF WBC: CPT | Performed by: INTERNAL MEDICINE

## 2023-06-19 PROCEDURE — 99232 SBSQ HOSP IP/OBS MODERATE 35: CPT | Mod: ,,, | Performed by: INTERNAL MEDICINE

## 2023-06-19 PROCEDURE — 80048 BASIC METABOLIC PNL TOTAL CA: CPT | Performed by: INTERNAL MEDICINE

## 2023-06-19 PROCEDURE — 84100 ASSAY OF PHOSPHORUS: CPT | Performed by: INTERNAL MEDICINE

## 2023-06-19 PROCEDURE — 21400001 HC TELEMETRY ROOM

## 2023-06-19 PROCEDURE — 97161 PT EVAL LOW COMPLEX 20 MIN: CPT

## 2023-06-19 PROCEDURE — 96376 TX/PRO/DX INJ SAME DRUG ADON: CPT

## 2023-06-19 PROCEDURE — 25000003 PHARM REV CODE 250: Performed by: INTERNAL MEDICINE

## 2023-06-19 PROCEDURE — 99232 SBSQ HOSP IP/OBS MODERATE 35: CPT | Mod: ,,, | Performed by: PSYCHIATRY & NEUROLOGY

## 2023-06-19 PROCEDURE — 96372 THER/PROPH/DIAG INJ SC/IM: CPT | Performed by: INTERNAL MEDICINE

## 2023-06-19 PROCEDURE — 36415 COLL VENOUS BLD VENIPUNCTURE: CPT | Performed by: INTERNAL MEDICINE

## 2023-06-19 PROCEDURE — 99232 PR SUBSEQUENT HOSPITAL CARE,LEVL II: ICD-10-PCS | Mod: ,,, | Performed by: INTERNAL MEDICINE

## 2023-06-19 PROCEDURE — 99232 PR SUBSEQUENT HOSPITAL CARE,LEVL II: ICD-10-PCS | Mod: ,,, | Performed by: PSYCHIATRY & NEUROLOGY

## 2023-06-19 PROCEDURE — 94640 AIRWAY INHALATION TREATMENT: CPT

## 2023-06-19 PROCEDURE — 96375 TX/PRO/DX INJ NEW DRUG ADDON: CPT

## 2023-06-19 PROCEDURE — 63600175 PHARM REV CODE 636 W HCPCS: Performed by: INTERNAL MEDICINE

## 2023-06-19 RX ORDER — DIAZEPAM 5 MG/1
10 TABLET ORAL ONCE
Status: COMPLETED | OUTPATIENT
Start: 2023-06-22 | End: 2023-06-22

## 2023-06-19 RX ORDER — DIAZEPAM 5 MG/1
10 TABLET ORAL EVERY MORNING
Status: DISCONTINUED | OUTPATIENT
Start: 2023-06-21 | End: 2023-06-19

## 2023-06-19 RX ORDER — TRAZODONE HYDROCHLORIDE 100 MG/1
300 TABLET ORAL NIGHTLY
Status: DISCONTINUED | OUTPATIENT
Start: 2023-06-19 | End: 2023-06-22 | Stop reason: HOSPADM

## 2023-06-19 RX ORDER — DIAZEPAM 5 MG/1
5 TABLET ORAL ONCE
Status: DISCONTINUED | OUTPATIENT
Start: 2023-06-23 | End: 2023-06-22 | Stop reason: HOSPADM

## 2023-06-19 RX ORDER — DIAZEPAM 5 MG/1
5 TABLET ORAL EVERY 8 HOURS
Status: DISCONTINUED | OUTPATIENT
Start: 2023-06-23 | End: 2023-06-19

## 2023-06-19 RX ORDER — DIAZEPAM 5 MG/1
5 TABLET ORAL 2 TIMES DAILY
Status: DISCONTINUED | OUTPATIENT
Start: 2023-06-24 | End: 2023-06-19

## 2023-06-19 RX ORDER — DULOXETIN HYDROCHLORIDE 60 MG/1
60 CAPSULE, DELAYED RELEASE ORAL DAILY
Status: DISCONTINUED | OUTPATIENT
Start: 2023-06-20 | End: 2023-06-22 | Stop reason: HOSPADM

## 2023-06-19 RX ORDER — DIAZEPAM 5 MG/1
10 TABLET ORAL
Status: DISCONTINUED | OUTPATIENT
Start: 2023-06-21 | End: 2023-06-19

## 2023-06-19 RX ORDER — DIAZEPAM 5 MG/1
5 TABLET ORAL EVERY 24 HOURS
Status: DISCONTINUED | OUTPATIENT
Start: 2023-06-21 | End: 2023-06-22 | Stop reason: HOSPADM

## 2023-06-19 RX ORDER — DIAZEPAM 5 MG/1
5 TABLET ORAL ONCE
Status: DISCONTINUED | OUTPATIENT
Start: 2023-06-19 | End: 2023-06-19

## 2023-06-19 RX ORDER — DIAZEPAM 5 MG/1
5 TABLET ORAL EVERY EVENING
Status: DISCONTINUED | OUTPATIENT
Start: 2023-06-21 | End: 2023-06-19

## 2023-06-19 RX ORDER — DIAZEPAM 5 MG/1
10 TABLET ORAL ONCE
Status: COMPLETED | OUTPATIENT
Start: 2023-06-19 | End: 2023-06-19

## 2023-06-19 RX ORDER — DIAZEPAM 5 MG/1
5 TABLET ORAL EVERY 24 HOURS
Status: DISCONTINUED | OUTPATIENT
Start: 2023-06-22 | End: 2023-06-22 | Stop reason: HOSPADM

## 2023-06-19 RX ORDER — DIAZEPAM 5 MG/1
5 TABLET ORAL 2 TIMES DAILY
Status: DISCONTINUED | OUTPATIENT
Start: 2023-06-25 | End: 2023-06-19

## 2023-06-19 RX ORDER — DIAZEPAM 5 MG/1
5 TABLET ORAL 2 TIMES DAILY
Status: DISCONTINUED | OUTPATIENT
Start: 2023-06-24 | End: 2023-06-22 | Stop reason: HOSPADM

## 2023-06-19 RX ORDER — DIAZEPAM 5 MG/1
5 TABLET ORAL
Status: DISCONTINUED | OUTPATIENT
Start: 2023-06-22 | End: 2023-06-19

## 2023-06-19 RX ORDER — DIAZEPAM 5 MG/1
10 TABLET ORAL 2 TIMES DAILY
Status: COMPLETED | OUTPATIENT
Start: 2023-06-21 | End: 2023-06-21

## 2023-06-19 RX ORDER — DIAZEPAM 5 MG/1
10 TABLET ORAL EVERY 8 HOURS
Status: DISCONTINUED | OUTPATIENT
Start: 2023-06-19 | End: 2023-06-19

## 2023-06-19 RX ORDER — DIAZEPAM 5 MG/1
5 TABLET ORAL NIGHTLY
Status: DISCONTINUED | OUTPATIENT
Start: 2023-06-25 | End: 2023-06-22 | Stop reason: HOSPADM

## 2023-06-19 RX ORDER — DIAZEPAM 5 MG/1
10 TABLET ORAL EVERY 8 HOURS
Status: DISPENSED | OUTPATIENT
Start: 2023-06-19 | End: 2023-06-20

## 2023-06-19 RX ADMIN — LEVOTHYROXINE SODIUM 50 MCG: 50 TABLET ORAL at 06:06

## 2023-06-19 RX ADMIN — DIAZEPAM 10 MG: 5 TABLET ORAL at 10:06

## 2023-06-19 RX ADMIN — NALTREXONE HYDROCHLORIDE 50 MG: 50 TABLET, FILM COATED ORAL at 08:06

## 2023-06-19 RX ADMIN — HEPARIN SODIUM 5000 UNITS: 5000 INJECTION INTRAVENOUS; SUBCUTANEOUS at 06:06

## 2023-06-19 RX ADMIN — LISINOPRIL 20 MG: 20 TABLET ORAL at 08:06

## 2023-06-19 RX ADMIN — HEPARIN SODIUM 5000 UNITS: 5000 INJECTION INTRAVENOUS; SUBCUTANEOUS at 01:06

## 2023-06-19 RX ADMIN — DIAZEPAM 10 MG: 5 TABLET ORAL at 01:06

## 2023-06-19 RX ADMIN — DIAZEPAM 10 MG: 5 TABLET ORAL at 06:06

## 2023-06-19 RX ADMIN — MUPIROCIN: 20 OINTMENT TOPICAL at 08:06

## 2023-06-19 RX ADMIN — IBUPROFEN 400 MG: 400 TABLET, FILM COATED ORAL at 01:06

## 2023-06-19 RX ADMIN — LORAZEPAM 2 MG: 2 INJECTION INTRAMUSCULAR; INTRAVENOUS at 09:06

## 2023-06-19 RX ADMIN — MUPIROCIN: 20 OINTMENT TOPICAL at 10:06

## 2023-06-19 RX ADMIN — LORAZEPAM 2 MG: 2 INJECTION INTRAMUSCULAR; INTRAVENOUS at 01:06

## 2023-06-19 RX ADMIN — DULOXETINE HYDROCHLORIDE 90 MG: 30 CAPSULE, DELAYED RELEASE ORAL at 08:06

## 2023-06-19 RX ADMIN — ONDANSETRON 4 MG: 2 INJECTION INTRAMUSCULAR; INTRAVENOUS at 06:06

## 2023-06-19 RX ADMIN — HEPARIN SODIUM 5000 UNITS: 5000 INJECTION INTRAVENOUS; SUBCUTANEOUS at 10:06

## 2023-06-19 RX ADMIN — Medication 100 MG: at 08:06

## 2023-06-19 RX ADMIN — PANTOPRAZOLE SODIUM 40 MG: 40 TABLET, DELAYED RELEASE ORAL at 08:06

## 2023-06-19 RX ADMIN — TRAZODONE HYDROCHLORIDE 300 MG: 100 TABLET ORAL at 10:06

## 2023-06-19 RX ADMIN — FOLIC ACID 1 MG: 1 TABLET ORAL at 08:06

## 2023-06-19 RX ADMIN — FLUTICASONE FUROATE AND VILANTEROL TRIFENATATE 1 PUFF: 100; 25 POWDER RESPIRATORY (INHALATION) at 08:06

## 2023-06-19 RX ADMIN — THERA TABS 1 TABLET: TAB at 09:06

## 2023-06-19 NOTE — PROGRESS NOTES
CONSULTATION LIAISON PSYCHIATRY PROGRESS NOTE    Patient Name: Earl Abdul  MRN: [id number]  Patient Class: OP- Observation  Admission Date: 6/16/2023  Attending Physician: Andrei Sosa MD      SUBJECTIVE:   Earl Abdul is a 63 y.o. female with past psychiatric history of tobacco abuse, alcohol abuse with seizures and complicated withdrawal, and depression with SA in 2017 & past pertinent medical history of breast cancer, HTN, HLD, fibromyalgia, sarcoidosis, hypothyroidism, T2DM, CLL (not on treatment) and COPD  presents to the ED/admitted to the hospital for Alcohol withdrawal syndrome with complication.  Utox + for benzo (prescribed Librium 6/6 per ),  EtOH 215, PETH pending.      Psychiatry consulted for management of alcohol withdrawal, PEC'd for SI.    PEC discontinued yesterday. VS with HTN x1 yesterday, hypotensive x1. Compliant with medications. PRN tylenol x1, ibuprofen x1, Ativan x2 @2104, 0904, zofran x1. Medication changes were not made (discontinue naltrexone, decrease cymbalta, starting fluoxetine). Platelets low at 87.     Patient notes increased anxiety today, increase tremulousness. States PRN Ativan helps. Says mood is anxious, denies depression, suicidal ideation. Notes suicidal statements were made while intoxicated. States medication changes were not made as recommend by weekend psych. She is worried about decreasing Cymbalta as it may exacerbate fibromyalgia. States she has been sleeping and eating well. Notes abdominal pain, cramping, and nausea. Denies suicidal ideation/plan/intent. Requests consults from hematology/oncology. Request script of ativan on discharge to assist with prolonged anxiety/tremulousness. Denies HI/AVH.     Planning to enroll in [facility] but after upcoming vacation in July.        OBJECTIVE:    Mental Status Exam:  General Appearance:  appears stated age, fair grooming, casually dressed in hospital gown  Behavior:  cooperative, appropriate  "eye contact; anxious  Involuntary Movements and Motor Activity:  tremors noted to bilateral upper extremities  Gait and Station: unable to assess - patient lying down or seated  Speech and Language: intact; normal rate, rhythm, volume, tone, and pitch; conversational, spontaneous, and coherent; speaks and understands English proficiently and fluently; repeats words and phrases, no word finding difficulties are noted  Mood: "anxious" denies depression  Affect: anxious  Thought Process and Associations:  coherent, linear, goal-directed  Thought Content and Perceptions:: no suicidal or homicidal ideation, no auditory or visual hallucinations, no paranoid ideation, no ideas of reference, no evidence of delusions or psychosis  Sensorium and Orientation: intact; alert with clear sensorium; oriented fully to person, place, time and situation  Recent and Remote Memory: grossly intact, able to recall relevant and salient information from the recent and remote past  Attention and Concentration: grossly intact, attentive to the conversation and not readily distractible  Fund of Knowledge: grossly intact, used appropriate vocabulary and demonstrated an awareness of current events, consistent with educational level achieved  Insight: intact, demonstrates awareness of illness and situation  Judgment: intact, behavior is adequate/appropriate to the circumstances, compliant with health provider's recommendations and instructions    CAM ICU positive? no      ASSESSMENT & RECOMMENDATIONS   Alcohol use disorder, severe  Major depressive disorder, recurrent  Unspecified anxiety  R/o substance induced mood disorder  Tobacco use disorder      PSYCH MEDICATIONS  Scheduled  - Recommend Valium taper over next week:  6/20: Valium 10mg qAM/ 10mg @1200 /10mg qPM  6/21: Valium 10mg qAM/ 5mg @1200 /10mg qPM  6/22: Valium 10mg qAM/ 5mg @ 1200 /5mg qPM  6/23: Valium 5mg qAM/ 5mg @ 1200 /5mg qPM  6/24: Valium 5mg BID  6/25: Valium 5mg qHS  - " Decrease Cymbalta 60mg qD  - Continue Trazodone 300mg qHS  - Hold Naltrexone  PRN  - PRN Ativan 2 mg PO q4h PRN CIWA > 8, or SBP > 160, DBP > 100, or HR > 110 (meeting 2 out of 3 vital sign criteria)      RISK ASSESSMENT  NO NEED FOR PEC - denying suicidal ideation/plan/intent. Agreeable to treatment    FOLLOW UP  Will sign off at this time. Please consult addiction psych for any further addiction psych related concerns.     DISPOSITION - once medically cleared:  Defer to medical team  Recommend following up with PCP  Psychiatric resources placed in discharge instructions    Please contact ON CALL psychiatry service (24/7) for any acute issues that may arise.    Dr. Nadia ALEJANDRO Psychiatry  [facility]  6/19/2023 1:14 PM        --------------------------------------------------------------------------------------------------------------------------------------------------------------------------------------------------------------------------------------    CONTINUED OBJECTIVE clinical data & findings reviewed and noted for above decision making    Current Medications:   Scheduled Meds:    diazePAM  10 mg Oral Q8H    DULoxetine  90 mg Oral Daily    fluticasone furoate-vilanteroL  1 puff Inhalation Daily    folic acid  1 mg Oral Daily    heparin (porcine)  5,000 Units Subcutaneous Q8H    levothyroxine  50 mcg Oral Before breakfast    lisinopriL  20 mg Oral Daily    multivitamin  1 tablet Oral Daily    mupirocin   Topical (Top) BID    naltrexone  50 mg Oral Daily    pantoprazole  40 mg Oral Daily    thiamine  100 mg Oral Daily    traZODone  150 mg Oral QHS     PRN Meds: acetaminophen, albuterol, dextrose 10%, dextrose 10%, dextrose, dextrose, dicyclomine, glucagon (human recombinant), ibuprofen, insulin aspart U-100, loperamide, lorazepam, melatonin, naloxone, ondansetron, polyethylene glycol, prochlorperazine, sodium chloride 0.9%    Allergies:   Review of patient's allergies indicates:    Allergen Reactions    Lortab [hydrocodone-acetaminophen] Itching    Promethazine Itching and Other (See Comments)       Vitals  Vitals:    06/19/23 1146   BP: (!) 108/55   Pulse: 85   Resp: 18   Temp: 98.1 °F (36.7 °C)       Labs/Imaging/Studies:  Recent Results (from the past 24 hour(s))   Basic metabolic panel    Collection Time: 06/19/23  5:51 AM   Result Value Ref Range    Sodium 140 136 - 145 mmol/L    Potassium 4.0 3.5 - 5.1 mmol/L    Chloride 109 95 - 110 mmol/L    CO2 21 (L) 23 - 29 mmol/L    Glucose 125 (H) 70 - 110 mg/dL    BUN 5 (L) 8 - 23 mg/dL    Creatinine 0.7 0.5 - 1.4 mg/dL    Calcium 8.6 (L) 8.7 - 10.5 mg/dL    Anion Gap 10 8 - 16 mmol/L    eGFR >60.0 >60 mL/min/1.73 m^2   CBC auto differential    Collection Time: 06/19/23  5:51 AM   Result Value Ref Range    WBC 3.76 (L) 3.90 - 12.70 K/uL    RBC 3.47 (L) 4.00 - 5.40 M/uL    Hemoglobin 9.9 (L) 12.0 - 16.0 g/dL    Hematocrit 31.6 (L) 37.0 - 48.5 %    MCV 91 82 - 98 fL    MCH 28.5 27.0 - 31.0 pg    MCHC 31.3 (L) 32.0 - 36.0 g/dL    RDW 17.2 (H) 11.5 - 14.5 %    Platelets 87 (L) 150 - 450 K/uL    MPV 11.4 9.2 - 12.9 fL    Immature Granulocytes 0.3 0.0 - 0.5 %    Gran # (ANC) 0.9 (L) 1.8 - 7.7 K/uL    Immature Grans (Abs) 0.01 0.00 - 0.04 K/uL    Lymph # 2.6 1.0 - 4.8 K/uL    Mono # 0.3 0.3 - 1.0 K/uL    Eos # 0.1 0.0 - 0.5 K/uL    Baso # 0.01 0.00 - 0.20 K/uL    nRBC 0 0 /100 WBC    Gran % 23.1 (L) 38.0 - 73.0 %    Lymph % 67.8 (H) 18.0 - 48.0 %    Mono % 6.9 4.0 - 15.0 %    Eosinophil % 1.6 0.0 - 8.0 %    Basophil % 0.3 0.0 - 1.9 %    Aniso Moderate     Poik Slight     Poly Occasional     Ovalocytes Occasional     Smudge Cells Present     Differential Method Automated    Magnesium    Collection Time: 06/19/23  5:51 AM   Result Value Ref Range    Magnesium 1.6 1.6 - 2.6 mg/dL   Phosphorus    Collection Time: 06/19/23  5:51 AM   Result Value Ref Range    Phosphorus 4.3 2.7 - 4.5 mg/dL   POCT glucose    Collection Time: 06/19/23  7:02 AM   Result Value  Ref Range    POCT Glucose 111 (H) 70 - 110 mg/dL     Imaging Results              X-Ray Chest AP Portable (Final result)  Result time 06/16/23 11:43:25      Final result by Luis Holder MD (06/16/23 11:43:25)                   Impression:      See above      Electronically signed by: Luis Holder MD  Date:    06/16/2023  Time:    11:43               Narrative:    EXAMINATION:  XR CHEST AP PORTABLE    CLINICAL HISTORY:  Shortness of breath    TECHNIQUE:  Single frontal view of the chest was performed.    COMPARISON:  N 05/15/2023 one    FINDINGS:  Heart size normal.  The lungs are clear.  No pleural effusion.  Surgical changes in the breast identified similar to the previous study                                       CT Head Without Contrast (Final result)  Result time 06/16/23 11:06:29      Final result by Kevin Miller MD (06/16/23 11:06:29)                   Impression:      1. No acute intracranial findings.  2. Recent appearing mild displaced left nasal bone fracture.  No additional recent appearing for facial bone fractures identified.  3. No acute cervical spine fracture.  4. Extensive cervical and supraclavicular adenopathy.  Continued attention on follow-up recommended.  5. Additional details, as provided in the body of report.      Electronically signed by: Kevin Miller  Date:    06/16/2023  Time:    11:06               Narrative:    EXAMINATION:  CT HEAD WITHOUT CONTRAST; CT MAXILLOFACIAL WITHOUT CONTRAST; CT CERVICAL SPINE WITHOUT CONTRAST    CLINICAL HISTORY:  Head trauma, minor (Age >= 65y);; Facial trauma, blunt;; Neck trauma (Age >= 65y);    TECHNIQUE:  Low dose axial images were obtained through the head, cervical spine, and facial bones.  Coronal and sagittal reformations were also performed. Contrast was not administered.    COMPARISON:  CT head 05/15/2023.    FINDINGS:  Head:    Blood: No acute intracranial hemorrhage.    Parenchyma: No definite loss of gray-white differentiation to  suggest acute or subacute transcortical infarct. Remote lacunar infarcts versus prominent perivascular spaces inferior left basal ganglia, as before.  Generalized pattern age-related parenchymal volume loss.  Nonspecific areas of white matter hypoattenuation may relate to sequela of chronic small vessel ischemic disease.    Ventricles/Extra-axial spaces: No abnormal extra-axial fluid collection. Basal cisterns are patent.    Vessels: Mild-to-moderate atherosclerotic calcification.    Orbits: See below.    Scalp: Unremarkable.    Skull: There are no depressed skull fractures or destructive bone lesions.    Sinuses and mastoids: See below.    Other findings: None    Facial bones:    Acute facial fractures: Recent appearing mild displaced nasal bone fracture, with mild leftward deviation of the nasal arch, new since head CT 04/01/2023.  Overlying soft tissue swelling is noted without definite radiopaque foreign body.  Nasal septum appears intact.  Nasolacrimal ducts appear uninvolved.    No definite additional recent appearing facial bone fracture seen.    Pterygoid plates, Zygomatic arches, Sphenotemporal buttresses: Intact.    Nasal Bones: As above.    Mandible: The mandible is intact.  Leftward asymmetric TMJ DJD.    Dentition: No tooth fracture. No periapical lucencies.    Sinuses: There are no fluid levels in the sinuses.    Imaged mastoids and middle ears: Partial opacification of the dependent right mastoid air cells, possibly on the basis of mucosal thickening and or effusion.    Imaged upper cervical spine: See below.    Facial soft tissues: See above.    Orbits: The bony orbits and orbital contents are atraumatic.    Imaged intracranial structures and upper aerodigestive tract: Unremarkable.    Cervical spine:    Comment: Motion compromised examination.    Fractures: No acute fractures    Alignment: Straightening of anticipated cervical lordosis.  Grade 1 anterolisthesis of C4 on C5.  Atlanto-axial and  atlanto-occipital joints: Atlanto-axial and atlanto-occipital intervals are not widened.  Facet joints: There is no traumatic facet joint widening.Degenerative facet arthropathy  Vertebral bodies: Heterogeneous marrow attenuation.  Appearance relatively similar to osseous structures seen on abdomen/pelvis CT 04/26/2023 and chest, abdomen, and pelvis CT 03/13/2023.  Discs: Degenerative disc osteophyte complex formation.  Spinal canal and foraminal narrowing: Although CT does not optimally evaluate the soft tissue contents of the spinal canal and foramina, no critical stenosis is suggested.    At C5-6, suspect moderate left foraminal narrowing      Paraspinal soft tissues: Extensive cervical and supraclavicular adenopathy.    Upper Lungs:Clear                                       CT Maxillofacial Without Contrast (Final result)  Result time 06/16/23 11:06:29      Final result by Kevin Miller MD (06/16/23 11:06:29)                   Impression:      1. No acute intracranial findings.  2. Recent appearing mild displaced left nasal bone fracture.  No additional recent appearing for facial bone fractures identified.  3. No acute cervical spine fracture.  4. Extensive cervical and supraclavicular adenopathy.  Continued attention on follow-up recommended.  5. Additional details, as provided in the body of report.      Electronically signed by: Kevin Miller  Date:    06/16/2023  Time:    11:06               Narrative:    EXAMINATION:  CT HEAD WITHOUT CONTRAST; CT MAXILLOFACIAL WITHOUT CONTRAST; CT CERVICAL SPINE WITHOUT CONTRAST    CLINICAL HISTORY:  Head trauma, minor (Age >= 65y);; Facial trauma, blunt;; Neck trauma (Age >= 65y);    TECHNIQUE:  Low dose axial images were obtained through the head, cervical spine, and facial bones.  Coronal and sagittal reformations were also performed. Contrast was not administered.    COMPARISON:  CT head 05/15/2023.    FINDINGS:  Head:    Blood: No acute intracranial  hemorrhage.    Parenchyma: No definite loss of gray-white differentiation to suggest acute or subacute transcortical infarct. Remote lacunar infarcts versus prominent perivascular spaces inferior left basal ganglia, as before.  Generalized pattern age-related parenchymal volume loss.  Nonspecific areas of white matter hypoattenuation may relate to sequela of chronic small vessel ischemic disease.    Ventricles/Extra-axial spaces: No abnormal extra-axial fluid collection. Basal cisterns are patent.    Vessels: Mild-to-moderate atherosclerotic calcification.    Orbits: See below.    Scalp: Unremarkable.    Skull: There are no depressed skull fractures or destructive bone lesions.    Sinuses and mastoids: See below.    Other findings: None    Facial bones:    Acute facial fractures: Recent appearing mild displaced nasal bone fracture, with mild leftward deviation of the nasal arch, new since head CT 04/01/2023.  Overlying soft tissue swelling is noted without definite radiopaque foreign body.  Nasal septum appears intact.  Nasolacrimal ducts appear uninvolved.    No definite additional recent appearing facial bone fracture seen.    Pterygoid plates, Zygomatic arches, Sphenotemporal buttresses: Intact.    Nasal Bones: As above.    Mandible: The mandible is intact.  Leftward asymmetric TMJ DJD.    Dentition: No tooth fracture. No periapical lucencies.    Sinuses: There are no fluid levels in the sinuses.    Imaged mastoids and middle ears: Partial opacification of the dependent right mastoid air cells, possibly on the basis of mucosal thickening and or effusion.    Imaged upper cervical spine: See below.    Facial soft tissues: See above.    Orbits: The bony orbits and orbital contents are atraumatic.    Imaged intracranial structures and upper aerodigestive tract: Unremarkable.    Cervical spine:    Comment: Motion compromised examination.    Fractures: No acute fractures    Alignment: Straightening of anticipated  cervical lordosis.  Grade 1 anterolisthesis of C4 on C5.  Atlanto-axial and atlanto-occipital joints: Atlanto-axial and atlanto-occipital intervals are not widened.  Facet joints: There is no traumatic facet joint widening.Degenerative facet arthropathy  Vertebral bodies: Heterogeneous marrow attenuation.  Appearance relatively similar to osseous structures seen on abdomen/pelvis CT 04/26/2023 and chest, abdomen, and pelvis CT 03/13/2023.  Discs: Degenerative disc osteophyte complex formation.  Spinal canal and foraminal narrowing: Although CT does not optimally evaluate the soft tissue contents of the spinal canal and foramina, no critical stenosis is suggested.    At C5-6, suspect moderate left foraminal narrowing      Paraspinal soft tissues: Extensive cervical and supraclavicular adenopathy.    Upper Lungs:Clear                                       CT Cervical Spine Without Contrast (Final result)  Result time 06/16/23 11:06:29      Final result by Kevin Miller MD (06/16/23 11:06:29)                   Impression:      1. No acute intracranial findings.  2. Recent appearing mild displaced left nasal bone fracture.  No additional recent appearing for facial bone fractures identified.  3. No acute cervical spine fracture.  4. Extensive cervical and supraclavicular adenopathy.  Continued attention on follow-up recommended.  5. Additional details, as provided in the body of report.      Electronically signed by: Kevin Miller  Date:    06/16/2023  Time:    11:06               Narrative:    EXAMINATION:  CT HEAD WITHOUT CONTRAST; CT MAXILLOFACIAL WITHOUT CONTRAST; CT CERVICAL SPINE WITHOUT CONTRAST    CLINICAL HISTORY:  Head trauma, minor (Age >= 65y);; Facial trauma, blunt;; Neck trauma (Age >= 65y);    TECHNIQUE:  Low dose axial images were obtained through the head, cervical spine, and facial bones.  Coronal and sagittal reformations were also performed. Contrast was not administered.    COMPARISON:  CT  head 05/15/2023.    FINDINGS:  Head:    Blood: No acute intracranial hemorrhage.    Parenchyma: No definite loss of gray-white differentiation to suggest acute or subacute transcortical infarct. Remote lacunar infarcts versus prominent perivascular spaces inferior left basal ganglia, as before.  Generalized pattern age-related parenchymal volume loss.  Nonspecific areas of white matter hypoattenuation may relate to sequela of chronic small vessel ischemic disease.    Ventricles/Extra-axial spaces: No abnormal extra-axial fluid collection. Basal cisterns are patent.    Vessels: Mild-to-moderate atherosclerotic calcification.    Orbits: See below.    Scalp: Unremarkable.    Skull: There are no depressed skull fractures or destructive bone lesions.    Sinuses and mastoids: See below.    Other findings: None    Facial bones:    Acute facial fractures: Recent appearing mild displaced nasal bone fracture, with mild leftward deviation of the nasal arch, new since head CT 04/01/2023.  Overlying soft tissue swelling is noted without definite radiopaque foreign body.  Nasal septum appears intact.  Nasolacrimal ducts appear uninvolved.    No definite additional recent appearing facial bone fracture seen.    Pterygoid plates, Zygomatic arches, Sphenotemporal buttresses: Intact.    Nasal Bones: As above.    Mandible: The mandible is intact.  Leftward asymmetric TMJ DJD.    Dentition: No tooth fracture. No periapical lucencies.    Sinuses: There are no fluid levels in the sinuses.    Imaged mastoids and middle ears: Partial opacification of the dependent right mastoid air cells, possibly on the basis of mucosal thickening and or effusion.    Imaged upper cervical spine: See below.    Facial soft tissues: See above.    Orbits: The bony orbits and orbital contents are atraumatic.    Imaged intracranial structures and upper aerodigestive tract: Unremarkable.    Cervical spine:    Comment: Motion compromised  examination.    Fractures: No acute fractures    Alignment: Straightening of anticipated cervical lordosis.  Grade 1 anterolisthesis of C4 on C5.  Atlanto-axial and atlanto-occipital joints: Atlanto-axial and atlanto-occipital intervals are not widened.  Facet joints: There is no traumatic facet joint widening.Degenerative facet arthropathy  Vertebral bodies: Heterogeneous marrow attenuation.  Appearance relatively similar to osseous structures seen on abdomen/pelvis CT 04/26/2023 and chest, abdomen, and pelvis CT 03/13/2023.  Discs: Degenerative disc osteophyte complex formation.  Spinal canal and foraminal narrowing: Although CT does not optimally evaluate the soft tissue contents of the spinal canal and foramina, no critical stenosis is suggested.    At C5-6, suspect moderate left foraminal narrowing      Paraspinal soft tissues: Extensive cervical and supraclavicular adenopathy.    Upper Lungs:Clear

## 2023-06-19 NOTE — PLAN OF CARE
No significant changes this shift. Continue with POC and monitor. Pt left lying semi fowlers bed in lowest position, with call light in reach, and all wheels locked X3 rails up.     Problem: Violence Risk or Actual  Goal: Anger and Impulse Control  Outcome: Ongoing, Progressing     Problem: Adult Inpatient Plan of Care  Goal: Plan of Care Review  Outcome: Ongoing, Progressing  Goal: Patient-Specific Goal (Individualized)  Outcome: Ongoing, Progressing  Goal: Absence of Hospital-Acquired Illness or Injury  Outcome: Ongoing, Progressing  Goal: Optimal Comfort and Wellbeing  Outcome: Ongoing, Progressing  Goal: Readiness for Transition of Care  Outcome: Ongoing, Progressing     Problem: Diabetes Comorbidity  Goal: Blood Glucose Level Within Targeted Range  Outcome: Ongoing, Progressing     Problem: Fluid and Electrolyte Imbalance (Acute Kidney Injury/Impairment)  Goal: Fluid and Electrolyte Balance  Outcome: Ongoing, Progressing     Problem: Oral Intake Inadequate (Acute Kidney Injury/Impairment)  Goal: Optimal Nutrition Intake  Outcome: Ongoing, Progressing     Problem: Renal Function Impairment (Acute Kidney Injury/Impairment)  Goal: Effective Renal Function  Outcome: Ongoing, Progressing     Problem: Skin Injury Risk Increased  Goal: Skin Health and Integrity  Outcome: Ongoing, Progressing

## 2023-06-19 NOTE — NURSING
IV is painful to flush.  Consulted IV team per patient's request as this is the 4th IV needed  in 3 days.

## 2023-06-19 NOTE — PLAN OF CARE
Plan of Care:  PT evaluation completed- see note for details. No acute functional deficits identified upon evaluation. Once medically stable, recommending pt discharge home with no further physical therapy needs anticipated.     6/19/2023

## 2023-06-19 NOTE — PT/OT/SLP EVAL
"Physical Therapy Evaluation and Discharge Note    Patient Name:  Earl Abdul   MRN:  [id number]    Recommendations:     Discharge Recommendations: other (see comments)  Discharge Equipment Recommendations: none   Barriers to discharge: None    Assessment:     Earl Abdul is a 63 y.o. female admitted with a medical diagnosis of Alcohol withdrawal syndrome with complication.  At this time, patient is functioning at their prior level of function and does not require further acute PT services. Pt safe to ambulate with nursing staff supervision during acute hospital stay to prevent deconditioning.       Recent Surgery: * No surgery found *      Plan:     During this hospitalization, patient does not require further acute PT services.  Please re-consult if situation changes.      Subjective     Chief Complaint: tremors   Patient/Family Comments/goals: "I was feeling better, but I started feeling more shaky today"   Pain/Comfort:  Pain Rating 1: 0/10  Pain Rating Post-Intervention 1: 0/10    Patients cultural, spiritual, Jewish conflicts given the current situation: no    Living Environment/PLOF:  Pt reported living with spouse in 2  with 4-5 JANESSA.   Prior to admission, patients level of function was (I) with mobility and ADLs.  Equipment used at home: oxygen at night.   Upon discharge, patient will have assistance from spouse.    Objective:     Communicated with RN prior to session.  Patient found HOB elevated with telemetry upon PT entry to room.    General Precautions: Standard, fall, seizure    Orthopedic Precautions:N/A   Braces: N/A  Respiratory Status: Room air    Exams:  Cognitive Exam:  Patient is oriented to Person, Place, Time, and Situation  Gross Motor Coordination:  WFL  Postural Exam:  Patient presented with the following abnormalities:    -       Rounded shoulders  Sensation:    -       Intact  light/touch BLEs  RLE ROM: WFL  RLE Strength: WFL  LLE ROM: WFL  LLE Strength: WFL    Functional " Mobility:    Bed Mobility:   Supine > Sit: modified independence  Sit > Supine: modified independence    Transfers:   Sit <> Stand Transfer: supervision from EOB without AD    Balance:   Sitting balance: NORMAL: No deviations seen in posture held dynamically  Standing balance:   FAIR+: Takes MINIMAL challenges from all directions  FAIR+: Needs CLOSE SUPERVISION during gait and is able to right self with minor LOB                 Gait:  Distance: ~70 ft.    Assistive Device: no AD   Assistance Level: supervision  Gait Assessment: reciprocal gait pattern with no mild instability due to tremors but no overt LOB. Pt denied any dizziness or SOB upon exertion.       AM-PAC 6 CLICK MOBILITY  Total Score:22       Treatment and Education:  Pt educated on:  - Role of PT and therapy recommendations   - Safety with mobility and fall risk   - Instructed to call nursing staff for assistance with mobility as needed     Pt verbalized understanding and expressed no further concerns/questions.         Patient left HOB elevated with all lines intact, call button in reach, and RN notified.    GOALS:   Multidisciplinary Problems       Physical Therapy Goals       Not on file              Multidisciplinary Problems (Resolved)          Problem: Physical Therapy    Goal Priority Disciplines Outcome Goal Variances Interventions   Physical Therapy Goal   (Resolved)     PT, PT/OT Met                         History:     Past Medical History:   Diagnosis Date    Anemia     Anemia     Controlled type 2 diabetes mellitus without complication, without long-term current use of insulin 11/30/2021    COPD (chronic obstructive pulmonary disease)     Depression     Diverticulitis     Fatty liver     GERD (gastroesophageal reflux disease)     Hyperlipidemia     Hypertension     Pancreatitis     Peptic ulcer disease     Polysubstance abuse     Posterior reversible encephalopathy syndrome     Sarcoidosis of lung     Sarcoidosis of lung     over 30 yrs  ago    Seizures     7/2017    Suicide attempt     Suicide ideation        Past Surgical History:   Procedure Laterality Date    APPENDECTOMY      BILATERAL MASTECTOMY Bilateral 10/29/2020    Procedure: MASTECTOMY, BILATERAL;  Surgeon: Baylee Kevin MD;  [facility];  Service: General;  Laterality: Bilateral;    BREAST REVISION SURGERY Bilateral 2/11/2021    Procedure: BREAST REVISION SURGERY;  Surgeon: Scottie Johnson MD;  [facility];  Service: Plastics;  Laterality: Bilateral;    COLONOSCOPY N/A 7/28/2017    Procedure: COLONOSCOPY;  Surgeon: Aaron Alvarado MD;  Location: [facility];  Service: Endoscopy;  Laterality: N/A;    ESOPHAGOGASTRODUODENOSCOPY  10/7/2016, 11/6/2014    2016 - gastritis, duodenitis, 2014 erosive gastritis    ESOPHAGOGASTRODUODENOSCOPY N/A 2/11/2020    Procedure: ESOPHAGOGASTRODUODENOSCOPY (EGD);  Surgeon: Fawn Garrido MD;  Location: [facility];  Service: Endoscopy;  Laterality: N/A;    ESOPHAGOGASTRODUODENOSCOPY N/A 4/19/2021    Procedure: EGD (ESOPHAGOGASTRODUODENOSCOPY);  Surgeon: Paramjit Martino MD;  Location: [facility];  Service: Endoscopy;  Laterality: N/A;    FLEXIBLE SIGMOIDOSCOPY  11/06/2014    colitis    HYSTERECTOMY      IMPLANTATION OF PERMANENT SACRAL NERVE STIMULATOR N/A 7/12/2022    Procedure: INSERTION, NEUROSTIMULATOR, PERMANENT, SACRAL;  Surgeon: Juaquin Edwards MD;  Location: [facility] OR [address] [place] [address];  Service: Urology;  Laterality: N/A;  1hr    INJECTION FOR SENTINEL NODE IDENTIFICATION Right 10/29/2020    Procedure: INJECTION, FOR SENTINEL NODE IDENTIFICATION;  Surgeon: Baylee Kevin MD;  [facility];  Service: General;  Laterality: Right;    INJECTION OF JOINT Right 10/10/2019    Procedure: Injection, Joint RIGHT ILIOPSOAS BURSA/TENDON INJECTION AND RIGHT GLUTEAL TENDON INJECTION WITH STEROID AND LIDOCAINE;  Surgeon: Guillaume Rico MD;  Location: [facility];  Service: Pain Management;  Laterality: Right;  NEEDS  CONSENT    INSERTION OF BREAST TISSUE EXPANDER Bilateral 10/29/2020    Procedure: INSERTION, TISSUE EXPANDER, BREAST;  Surgeon: Scottie Johnson MD;  [facility];  Service: Plastics;  Laterality: Bilateral;  Right breast: 1082 g  Left breast: 1076 g    LIPOSUCTION Bilateral 2/11/2021    Procedure: LIPOSUCTION;  Surgeon: Scottie Johnson MD;  [facility];  Service: Plastics;  Laterality: Bilateral;    mediastenoscopy      REPLACEMENT OF IMPLANT OF BREAST Bilateral 2/11/2021    Procedure: REPLACEMENT, IMPLANT, BREAST;  Surgeon: Scottie Johnson MD;  [facility];  Service: Plastics;  Laterality: Bilateral;    SENTINEL LYMPH NODE BIOPSY Right 10/29/2020    Procedure: BIOPSY, LYMPH NODE, SENTINEL;  Surgeon: Baylee Kevin MD;  Location: [facility] OR [address] [place] [address];  Service: General;  Laterality: Right;    TONSILLECTOMY N/A 1970    TUBAL LIGATION         Time Tracking:     PT Received On: 06/19/23  PT Start Time: 1126     PT Stop Time: 1136  PT Total Time (min): 10 min     Billable Minutes: Evaluation 10 min      06/19/2023

## 2023-06-20 LAB
ANION GAP SERPL CALC-SCNC: 7 MMOL/L (ref 8–16)
ANISOCYTOSIS BLD QL SMEAR: ABNORMAL
BASOPHILS # BLD AUTO: 0.01 K/UL (ref 0–0.2)
BASOPHILS NFR BLD: 0.3 % (ref 0–1.9)
BUN SERPL-MCNC: 7 MG/DL (ref 8–23)
CALCIUM SERPL-MCNC: 8.5 MG/DL (ref 8.7–10.5)
CHLORIDE SERPL-SCNC: 109 MMOL/L (ref 95–110)
CO2 SERPL-SCNC: 23 MMOL/L (ref 23–29)
CREAT SERPL-MCNC: 0.7 MG/DL (ref 0.5–1.4)
DIFFERENTIAL METHOD: ABNORMAL
EOSINOPHIL # BLD AUTO: 0.1 K/UL (ref 0–0.5)
EOSINOPHIL NFR BLD: 1.8 % (ref 0–8)
ERYTHROCYTE [DISTWIDTH] IN BLOOD BY AUTOMATED COUNT: 17.4 % (ref 11.5–14.5)
EST. GFR  (NO RACE VARIABLE): >60 ML/MIN/1.73 M^2
GLUCOSE SERPL-MCNC: 142 MG/DL (ref 70–110)
HCT VFR BLD AUTO: 31.3 % (ref 37–48.5)
HGB BLD-MCNC: 9.6 G/DL (ref 12–16)
HYPOCHROMIA BLD QL SMEAR: ABNORMAL
IMM GRANULOCYTES # BLD AUTO: 0.01 K/UL (ref 0–0.04)
IMM GRANULOCYTES NFR BLD AUTO: 0.3 % (ref 0–0.5)
LYMPHOCYTES # BLD AUTO: 2.2 K/UL (ref 1–4.8)
LYMPHOCYTES NFR BLD: 65.2 % (ref 18–48)
MAGNESIUM SERPL-MCNC: 1.7 MG/DL (ref 1.6–2.6)
MCH RBC QN AUTO: 27.7 PG (ref 27–31)
MCHC RBC AUTO-ENTMCNC: 30.7 G/DL (ref 32–36)
MCV RBC AUTO: 90 FL (ref 82–98)
MONOCYTES # BLD AUTO: 0.3 K/UL (ref 0.3–1)
MONOCYTES NFR BLD: 8.8 % (ref 4–15)
NEUTROPHILS # BLD AUTO: 0.8 K/UL (ref 1.8–7.7)
NEUTROPHILS NFR BLD: 23.6 % (ref 38–73)
NRBC BLD-RTO: 0 /100 WBC
OVALOCYTES BLD QL SMEAR: ABNORMAL
PHOSPHATE SERPL-MCNC: 3.8 MG/DL (ref 2.7–4.5)
PLATELET # BLD AUTO: 83 K/UL (ref 150–450)
PLATELET BLD QL SMEAR: ABNORMAL
PMV BLD AUTO: 10.7 FL (ref 9.2–12.9)
POCT GLUCOSE: 100 MG/DL (ref 70–110)
POCT GLUCOSE: 108 MG/DL (ref 70–110)
POCT GLUCOSE: 144 MG/DL (ref 70–110)
POCT GLUCOSE: 76 MG/DL (ref 70–110)
POIKILOCYTOSIS BLD QL SMEAR: SLIGHT
POLYCHROMASIA BLD QL SMEAR: ABNORMAL
POTASSIUM SERPL-SCNC: 3.9 MMOL/L (ref 3.5–5.1)
RBC # BLD AUTO: 3.47 M/UL (ref 4–5.4)
SODIUM SERPL-SCNC: 139 MMOL/L (ref 136–145)
WBC # BLD AUTO: 3.39 K/UL (ref 3.9–12.7)

## 2023-06-20 PROCEDURE — 85025 COMPLETE CBC W/AUTO DIFF WBC: CPT | Performed by: INTERNAL MEDICINE

## 2023-06-20 PROCEDURE — 94761 N-INVAS EAR/PLS OXIMETRY MLT: CPT

## 2023-06-20 PROCEDURE — 94640 AIRWAY INHALATION TREATMENT: CPT

## 2023-06-20 PROCEDURE — 63600175 PHARM REV CODE 636 W HCPCS: Performed by: INTERNAL MEDICINE

## 2023-06-20 PROCEDURE — 83735 ASSAY OF MAGNESIUM: CPT | Performed by: INTERNAL MEDICINE

## 2023-06-20 PROCEDURE — 99900035 HC TECH TIME PER 15 MIN (STAT)

## 2023-06-20 PROCEDURE — 21400001 HC TELEMETRY ROOM

## 2023-06-20 PROCEDURE — 36415 COLL VENOUS BLD VENIPUNCTURE: CPT | Performed by: INTERNAL MEDICINE

## 2023-06-20 PROCEDURE — 99233 PR SUBSEQUENT HOSPITAL CARE,LEVL III: ICD-10-PCS | Mod: ,,, | Performed by: INTERNAL MEDICINE

## 2023-06-20 PROCEDURE — 99233 SBSQ HOSP IP/OBS HIGH 50: CPT | Mod: ,,, | Performed by: INTERNAL MEDICINE

## 2023-06-20 PROCEDURE — 80048 BASIC METABOLIC PNL TOTAL CA: CPT | Performed by: INTERNAL MEDICINE

## 2023-06-20 PROCEDURE — 25000003 PHARM REV CODE 250: Performed by: INTERNAL MEDICINE

## 2023-06-20 PROCEDURE — 84100 ASSAY OF PHOSPHORUS: CPT | Performed by: INTERNAL MEDICINE

## 2023-06-20 RX ADMIN — DIAZEPAM 10 MG: 5 TABLET ORAL at 06:06

## 2023-06-20 RX ADMIN — FOLIC ACID 1 MG: 1 TABLET ORAL at 08:06

## 2023-06-20 RX ADMIN — TRAZODONE HYDROCHLORIDE 300 MG: 100 TABLET ORAL at 09:06

## 2023-06-20 RX ADMIN — DIAZEPAM 10 MG: 5 TABLET ORAL at 01:06

## 2023-06-20 RX ADMIN — LORAZEPAM 2 MG: 2 INJECTION INTRAMUSCULAR; INTRAVENOUS at 04:06

## 2023-06-20 RX ADMIN — PANTOPRAZOLE SODIUM 40 MG: 40 TABLET, DELAYED RELEASE ORAL at 08:06

## 2023-06-20 RX ADMIN — THERA TABS 1 TABLET: TAB at 09:06

## 2023-06-20 RX ADMIN — LISINOPRIL 20 MG: 20 TABLET ORAL at 08:06

## 2023-06-20 RX ADMIN — HEPARIN SODIUM 5000 UNITS: 5000 INJECTION INTRAVENOUS; SUBCUTANEOUS at 06:06

## 2023-06-20 RX ADMIN — HEPARIN SODIUM 5000 UNITS: 5000 INJECTION INTRAVENOUS; SUBCUTANEOUS at 01:06

## 2023-06-20 RX ADMIN — MUPIROCIN: 20 OINTMENT TOPICAL at 09:06

## 2023-06-20 RX ADMIN — HEPARIN SODIUM 5000 UNITS: 5000 INJECTION INTRAVENOUS; SUBCUTANEOUS at 09:06

## 2023-06-20 RX ADMIN — MUPIROCIN: 20 OINTMENT TOPICAL at 08:06

## 2023-06-20 RX ADMIN — LEVOTHYROXINE SODIUM 50 MCG: 50 TABLET ORAL at 06:06

## 2023-06-20 RX ADMIN — FLUTICASONE FUROATE AND VILANTEROL TRIFENATATE 1 PUFF: 100; 25 POWDER RESPIRATORY (INHALATION) at 08:06

## 2023-06-20 RX ADMIN — DULOXETINE HYDROCHLORIDE 60 MG: 60 CAPSULE, DELAYED RELEASE ORAL at 08:06

## 2023-06-20 RX ADMIN — Medication 100 MG: at 08:06

## 2023-06-20 NOTE — PROGRESS NOTES
[facility] Medicine  Progress Note    Patient Name: Earl Abdul  MRN: [id number]  Patient Class: IP- Inpatient   Admission Date: 6/16/2023  Length of Stay: 1 days  Attending Physician: Andrei Sosa MD  Primary Care Provider: Andrew Rodriguez MD        Subjective:     Principal Problem:Alcohol withdrawal syndrome with complication        HPI:  Ms. Abdul is a 63 year old woman with PMH EtOH use disorder with history of prior seizures, multiple admissions for alcohol withdrawal, depression with suicide attempt in 2017, breast cancer, HTN, HLD, fibromyalgia, sarcoidosis, hypothyroidism, T2DM, CLL (not on treatment) and COPD who [facility] with chief complaint of multiple falls.  Patient drank a lot of alcohol today, was walking around and lost her balance and fell. She reports that he did hit her head and loss consciousness. She endorsed facial pain and notes abrasion below right eye. She denies chest pain, abdominal pain, shortness breath, urinary symptoms, vomiting, numbness or weakness in her arms or legs. She drinks about a bottle of vodka a day (750 ml).    In the ED, she was afebrile, tachycardic to 108, tachypneic with RR 22, and O2 sat at 88% and was placed on 2 L NC. She was alert and oriented x3 and denied hallucinations. CT head showed no concern for intracranial hemorrhage. CT maxillofacial showed previous nasal fracture, no acute fractures. CT C-spine showed no concern for acute fractures. While in the ED, she later endorsed SI and she was PEC'd.     CBC showed WBC 17, CMP showed no electrolyte abnormalities and UA showed no concern for UTI. UDS positive for benzodiazepines. Ethanol level elevated at 215. TSH within normal limits. Patient started developing tremors, concern for alcohol withdrawal and was given a dose of valium 10 mg and subsequently given 1 mg Ativan. Addiction psychiatry consulted and patient was started on CIWA protocol. She was admitted to  Hospital Medicine service for further evaluation and management of alcohol withdrawal.       Overview/Hospital Course:  No notes on file    Interval History: Patient lying in bed, no acute distress. No acute events overnight. Patient is close to baseline mental status. Reports tremors and fatigue continue to improve. Tolerating valium taper. Patient has been compliance with medical plan since being off PEC and no additional SI. Ambulating without difficulty. Notes good po intake, regular bowel movements and good oral hydration. CIWA scores have been up to 11. Psychiatry provided slow valium taper that will be followed prior to discharge.     Review of Systems   Constitutional:  Positive for fatigue. Negative for activity change, chills and fever.   HENT:  Negative for congestion and trouble swallowing.    Eyes:  Negative for visual disturbance.   Respiratory:  Negative for cough and shortness of breath.    Cardiovascular:  Negative for chest pain and leg swelling.   Gastrointestinal:  Negative for abdominal distention, abdominal pain, nausea and vomiting.   Endocrine: Negative for cold intolerance, heat intolerance, polydipsia and polyuria.   Genitourinary:  Negative for difficulty urinating and dysuria.   Musculoskeletal:  Negative for back pain and myalgias.   Skin:  Negative for rash and wound.   Neurological:  Positive for tremors and weakness. Negative for dizziness and light-headedness.   Hematological:  Negative for adenopathy. Does not bruise/bleed easily.   Psychiatric/Behavioral:  Positive for decreased concentration. Negative for confusion, sleep disturbance and suicidal ideas.      Objective:     Vital Signs (Most Recent):  Temp: 97.3 °F (36.3 °C) (06/19/23 1642)  Pulse: 82 (06/19/23 1642)  Resp: 18 (06/19/23 1642)  BP: (!) 170/81 (06/19/23 1642)  SpO2: 97 % (06/19/23 1642) Vital Signs (24h Range):  Temp:  [97.3 °F (36.3 °C)-98.1 °F (36.7 °C)] 97.3 °F (36.3 °C)  Pulse:  [71-85] 82  Resp:  [18] 18  SpO2:   [93 %-98 %] 97 %  BP: (108-170)/(55-81) 170/81     Weight: 73.9 kg (163 lb)  Body mass index is 26.31 kg/m².    Intake/Output Summary (Last 24 hours) at 6/19/2023 2004  Last data filed at 6/19/2023 1349  Gross per 24 hour   Intake 720 ml   Output --   Net 720 ml           Physical Exam  Constitutional:       Appearance: She is well-developed. She is ill-appearing.   HENT:      Head: Normocephalic and atraumatic.   Eyes:      General: No scleral icterus.     Extraocular Movements: Extraocular movements intact.      Pupils: Pupils are equal, round, and reactive to light.   Neck:      Vascular: No JVD.   Cardiovascular:      Rate and Rhythm: Normal rate and regular rhythm.      Heart sounds: No murmur heard.    No friction rub. No gallop.   Pulmonary:      Effort: Pulmonary effort is normal. No respiratory distress.      Breath sounds: Normal breath sounds. No wheezing.   Abdominal:      General: Bowel sounds are normal. There is no distension.      Palpations: Abdomen is soft.      Tenderness: There is no abdominal tenderness.   Musculoskeletal:         General: No deformity. Normal range of motion.      Cervical back: Neck supple.   Lymphadenopathy:      Cervical: No cervical adenopathy.   Skin:     General: Skin is warm and dry.      Capillary Refill: Capillary refill takes less than 2 seconds.      Findings: No erythema or rash.   Neurological:      General: No focal deficit present.      Mental Status: She is alert and oriented to person, place, and time. Mental status is at baseline.      Cranial Nerves: No cranial nerve deficit.      Sensory: No sensory deficit.   Psychiatric:         Attention and Perception: She is inattentive.         Cognition and Memory: Cognition is impaired. Memory is impaired.           Significant Labs: A1C:   Recent Labs   Lab 12/23/22  0511 03/26/23  1656 06/16/23  1817   HGBA1C 4.6 5.1 4.7       Blood Culture: No results for input(s): LABBLOO in the last 48 hours.  CBC:   Recent Labs    Lab 06/18/23  0425 06/19/23  0551   WBC 3.13* 3.76*   HGB 9.4* 9.9*   HCT 30.5* 31.6*   PLT 80* 87*       CMP:   Recent Labs   Lab 06/18/23  0425 06/19/23  0551    140   K 3.9 4.0    109   CO2 25 21*   * 125*   BUN 5* 5*   CREATININE 0.7 0.7   CALCIUM 8.2* 8.6*   ANIONGAP 8 10       Coagulation:   No results for input(s): PT, INR, APTT in the last 48 hours.    Lactic Acid: No results for input(s): LACTATE in the last 48 hours.  Magnesium:   Recent Labs   Lab 06/18/23  0425 06/19/23  0551   MG 1.7 1.6       POCT Glucose:   Recent Labs   Lab 06/19/23  0702 06/19/23  1145 06/19/23  1637   POCTGLUCOSE 111* 130* 155*       Troponin: No results for input(s): TROPONINI, TROPONINIHS in the last 48 hours.  TSH:   Recent Labs   Lab 06/16/23  0918   TSH 1.283           Significant Imaging: I have reviewed all pertinent imaging results/findings within the past 24 hours.      Assessment/Plan:      * Alcohol withdrawal syndrome with complication  - She drinks about a bottle of vodka a day (750 ml).  - last drink day of admission on 6/16  - alcohol level on admit, 215  - followup PETH  - on CIWA protocol. CIWA score no higher than 11 on 6/19  - continue valium 10 mg q8h and prn ativan  - continue folate, thiamine, MVI  - replete electrolytes prn  - addiction psychiatry consulted. apprec recs  -- valium taper provided by psych as follows:  ? 6/20: Valium 10mg qAM/ 10mg @1200 /10mg qPM  6/21: Valium 10mg qAM/ 5mg @1200 /10mg qPM  6/22: Valium 10mg qAM/ 5mg @ 1200 /5mg qPM  6/23: Valium 5mg qAM/ 5mg @ 1200 /5mg qPM  6/24: Valium 5mg BID  6/25: Valium 5mg qHS      GERD (gastroesophageal reflux disease)  - continue home protonix      Hypernatremia  - likely 2/2 hypovolemic hypernatremia  - completed NS infusion   - RESOLVED       Acute hypoxemic respiratory failure  Patient with Hypoxic Respiratory failure which is Acute.  she is not on home oxygen. Supplemental oxygen was provided and noted- Oxygen Concentration  (%):  [28] 28    .   Signs/symptoms of respiratory failure include- tachypnea and lethargy. Contributing diagnoses includes - Aspiration, COPD and Pneumonia Labs and images were reviewed. Patient Has not had a recent ABG. Will treat underlying causes and adjust management of respiratory failure:    PLAN:  - not on oxygen at home   - O2 sat 88% on room air. On 2 L NC  - CXR nonacute   - followup TTE  - likely 2/2 tachypnea due to alcoholic ketosis   - wean O2 as tolerated     Migraine        CLL (chronic lymphocytic leukemia)  - Established with Dr. Gonzalez. Not currently on treatment.   - Chronic thrombocytopenia noted.  Possibly associated with alcohol use.   - Outpatient Heme-Onc follow up         Lymphocytosis        Type 2 diabetes mellitus with hyperglycemia  Patient's FSGs are controlled on current medication regimen.  Last A1c reviewed-   Lab Results   Component Value Date    HGBA1C 4.7 06/16/2023     Most recent fingerstick glucose reviewed-   Recent Labs   Lab 06/16/23  1228 06/16/23  2041   POCTGLUCOSE 77 75     Current correctional scale  Low  Maintain anti-hyperglycemic dose as follows-   Antihyperglycemics (From admission, onward)    Start     Stop Route Frequency Ordered    06/16/23 1658  insulin aspart U-100 pen 0-5 Units         -- SubQ Before meals & nightly PRN 06/16/23 1702        Hold Oral hypoglycemics while patient is in the hospital.  - Holding home metformin 1000mg BID      Anemia  - Hb on admit, 11  - baseline Hb 9-10  - likely hemoconcentration   - transfuse for Hb < 7  - CBC daily         COPD (chronic obstructive pulmonary disease)  - not on oxygen at home   - continue home Breo, combivent  - prn albuterol   - CXR negative   - wean O2 as tolerated   - O2 goal 8-92%         Malignant neoplasm of central portion of right breast in female, estrogen receptor positive  - T1b N0 stage IA breast cancer diagnosed October 2020.   - S/p bilateral mastectomy with no adjuvant therapy; received  "Letrozole February 2021-may 2021         VINICIO (obstructive sleep apnea)  - use CPAP nightly when AMS resolves       Suicidal ideation  - endorsed suicidal ideations on day of admission   - h/o SI, depression, anxiety and alcohol abuse   - PEC started on 6/16  - psychiatry consulted. apprec recs  -- PEC rescinded on 6/18      Hyperlipidemia  - not currently on a statin           History of sarcoidosis  - Patient with documented history of sarcoidosis.  Not currently on treatment.         Depression with anxiety  Patient has persistent depression which is moderate and is currently uncontrolled. Will Continue anti-depressant medications. We will consult psychiatry at this time. Patient does not display psychosis at this time. Continue to monitor closely and adjust plan of care as needed.    - continue home cymbalta and trazodone  - psychiatry consulted. apprec recs         History of substance abuse        Sarcoidosis  - Patient with documented history of sarcoidosis.  Not currently on treatment.      Fibromyalgia  - continue home cymbalta      Essential hypertension  - continue home lisiniopril    Alcohol use disorder, severe, dependence  - see "alcohol withdrawal"        VTE Risk Mitigation (From admission, onward)         Ordered     heparin (porcine) injection 5,000 Units  Every 8 hours         06/16/23 1702     IP VTE HIGH RISK PATIENT  Once         06/16/23 1702     Place sequential compression device  Until discontinued         06/16/23 1702                Discharge Planning   BECCA: 6/20/2023     Code Status: Full Code   Is the patient medically ready for discharge?: No    Reason for patient still in hospital (select all that apply): Patient unstable, Patient trending condition, Laboratory test, Treatment and Consult recommendations             Time spent in care of patient: > 35 minutes           Andrei Sosa MD  Department of Hospital Medicine   [facility] - Med Surg    "

## 2023-06-20 NOTE — PLAN OF CARE
[facility] Surg  Discharge Reassessment    Primary Care Provider: Andrew Rodriguez MD    Expected Discharge Date: 6/25/2023    Reassessment (most recent)       Discharge Reassessment - 06/20/23 1504          Discharge Reassessment    Assessment Type Discharge Planning Reassessment     Discharge Plan discussed with: Patient     Communicated BECCA with patient/caregiver Yes     Discharge Plan A Home with family     Discharge Plan B Home     DME Needed Upon Discharge  none     Transition of Care Barriers Substance Abuse     Why the patient remains in the hospital Requires continued medical care        Post-Acute Status    Post-Acute Authorization Other     Other Status [facility]     Hospital Resources/Appts/Education Provided Appointments scheduled and added to AVS     Discharge Delays None known at this time

## 2023-06-20 NOTE — PLAN OF CARE
06/20/23 1503   Post-Acute Status   Post-Acute Authorization Other   Other Status No Post-Acute Service Needs   Hospital Resources/Appts/Education Provided Community resources provided;Appointments scheduled and added to AVS   Discharge Delays None known at this time   Discharge Plan   Discharge Plan A Home with family   Discharge Plan B Home

## 2023-06-20 NOTE — PROGRESS NOTES
[facility] Medicine  Progress Note    Patient Name: Earl Abdul  MRN: [id number]  Patient Class: IP- Inpatient   Admission Date: 6/16/2023  Length of Stay: 1 days  Attending Physician: Andrei Sosa MD  Primary Care Provider: Andrew Rodriguez MD        Subjective:     Principal Problem:Alcohol withdrawal syndrome with complication        HPI:  Ms. Abdul is a 63 year old woman with PMH EtOH use disorder with history of prior seizures, multiple admissions for alcohol withdrawal, depression with suicide attempt in 2017, breast cancer, HTN, HLD, fibromyalgia, sarcoidosis, hypothyroidism, T2DM, CLL (not on treatment) and COPD who presented to [facility]ED with chief complaint of multiple falls.  Patient drank a lot of alcohol today, was walking around and lost her balance and fell. She reports that he did hit her head and loss consciousness. She endorsed facial pain and notes abrasion below right eye. She denies chest pain, abdominal pain, shortness breath, urinary symptoms, vomiting, numbness or weakness in her arms or legs. She drinks about a bottle of vodka a day (750 ml).    In the ED, she was afebrile, tachycardic to 108, tachypneic with RR 22, and O2 sat at 88% and was placed on 2 L NC. She was alert and oriented x3 and denied hallucinations. CT head showed no concern for intracranial hemorrhage. CT maxillofacial showed previous nasal fracture, no acute fractures. CT C-spine showed no concern for acute fractures. While in the ED, she later endorsed SI and she was PEC'd.     CBC showed WBC 17, CMP showed no electrolyte abnormalities and UA showed no concern for UTI. UDS positive for benzodiazepines. Ethanol level elevated at 215. TSH within normal limits. Patient started developing tremors, concern for alcohol withdrawal and was given a dose of valium 10 mg and subsequently given 1 mg Ativan. Addiction psychiatry consulted and patient was started on CIWA protocol. She was admitted to  Hospital Medicine service for further evaluation and management of alcohol withdrawal.       Overview/Hospital Course:  No notes on file    Interval History: Patient lying in bed, no acute distress. No acute events overnight. Patient more alert today and conversant. Alert and oriented x3. Reports tremors and fatigue are improving. Tolerating valium taper. Per psych, PEC rescinded since no longer having SI.     Review of Systems   Constitutional:  Positive for fatigue. Negative for activity change, chills and fever.   HENT:  Negative for congestion and trouble swallowing.    Eyes:  Negative for visual disturbance.   Respiratory:  Negative for cough and shortness of breath.    Cardiovascular:  Negative for chest pain and leg swelling.   Gastrointestinal:  Negative for abdominal distention, abdominal pain, nausea and vomiting.   Endocrine: Negative for cold intolerance, heat intolerance, polydipsia and polyuria.   Genitourinary:  Negative for difficulty urinating and dysuria.   Musculoskeletal:  Negative for back pain and myalgias.   Skin:  Negative for rash and wound.   Neurological:  Positive for tremors and weakness. Negative for dizziness and light-headedness.   Hematological:  Negative for adenopathy. Does not bruise/bleed easily.   Psychiatric/Behavioral:  Positive for decreased concentration. Negative for confusion, sleep disturbance and suicidal ideas.    Objective:     Vital Signs (Most Recent):  Temp: 97.3 °F (36.3 °C) (06/19/23 1642)  Pulse: 82 (06/19/23 1642)  Resp: 18 (06/19/23 1642)  BP: (!) 170/81 (06/19/23 1642)  SpO2: 97 % (06/19/23 1642) Vital Signs (24h Range):  Temp:  [97.3 °F (36.3 °C)-98.2 °F (36.8 °C)] 97.3 °F (36.3 °C)  Pulse:  [71-85] 82  Resp:  [18] 18  SpO2:  [93 %-98 %] 97 %  BP: (108-180)/(55-86) 170/81     Weight: 73.9 kg (163 lb)  Body mass index is 26.31 kg/m².    Intake/Output Summary (Last 24 hours) at 6/19/2023 1956  Last data filed at 6/19/2023 1349  Gross per 24 hour   Intake 720 ml    Output --   Net 720 ml         Physical Exam  Constitutional:       Appearance: She is well-developed. She is ill-appearing.   HENT:      Head: Normocephalic and atraumatic.   Eyes:      General: No scleral icterus.     Extraocular Movements: Extraocular movements intact.      Pupils: Pupils are equal, round, and reactive to light.   Neck:      Vascular: No JVD.   Cardiovascular:      Rate and Rhythm: Normal rate and regular rhythm.      Heart sounds: No murmur heard.    No friction rub. No gallop.   Pulmonary:      Effort: Pulmonary effort is normal. No respiratory distress.      Breath sounds: Normal breath sounds. No wheezing.   Abdominal:      General: Bowel sounds are normal. There is no distension.      Palpations: Abdomen is soft.      Tenderness: There is no abdominal tenderness.   Musculoskeletal:         General: No deformity. Normal range of motion.      Cervical back: Neck supple.   Lymphadenopathy:      Cervical: No cervical adenopathy.   Skin:     General: Skin is warm and dry.      Capillary Refill: Capillary refill takes less than 2 seconds.      Findings: No erythema or rash.   Neurological:      General: No focal deficit present.      Mental Status: She is alert and oriented to person, place, and time. Mental status is at baseline.      Cranial Nerves: No cranial nerve deficit.      Sensory: No sensory deficit.   Psychiatric:         Attention and Perception: She is inattentive.         Cognition and Memory: Cognition is impaired. Memory is impaired.           Significant Labs: A1C:   Recent Labs   Lab 12/23/22  0511 03/26/23  1656 06/16/23  1817   HGBA1C 4.6 5.1 4.7       Blood Culture: No results for input(s): LABBLOO in the last 48 hours.  CBC:   Recent Labs   Lab 06/18/23  0425 06/19/23  0551   WBC 3.13* 3.76*   HGB 9.4* 9.9*   HCT 30.5* 31.6*   PLT 80* 87*       CMP:   Recent Labs   Lab 06/18/23  0425 06/19/23  0551    140   K 3.9 4.0    109   CO2 25 21*   * 125*   BUN 5*  5*   CREATININE 0.7 0.7   CALCIUM 8.2* 8.6*   ANIONGAP 8 10       Coagulation:   No results for input(s): PT, INR, APTT in the last 48 hours.    Lactic Acid: No results for input(s): LACTATE in the last 48 hours.  Magnesium:   Recent Labs   Lab 06/18/23  0425 06/19/23  0551   MG 1.7 1.6       POCT Glucose:   Recent Labs   Lab 06/19/23  0702 06/19/23  1145 06/19/23  1637   POCTGLUCOSE 111* 130* 155*       Troponin: No results for input(s): TROPONINI, TROPONINIHS in the last 48 hours.  TSH:   Recent Labs   Lab 06/16/23  0918   TSH 1.283           Significant Imaging: I have reviewed all pertinent imaging results/findings within the past 24 hours.      Assessment/Plan:      * Alcohol withdrawal syndrome with complication  - She drinks about a bottle of vodka a day (750 ml).  - last drink day of admission on 6/16  - alcohol level on admit, 215  - followup PETH  - on CIWA protocol   - continue valium 10 mg q8h and prn ativan  - continue folate, thiamine, MVI  - replete electrolytes prn  - addiction psychiatry consulted. apprec recs  -- valium taper provided by psych as follows:  ? 6/20: Valium 10mg qAM/ 10mg @1200 /10mg qPM  6/21: Valium 10mg qAM/ 5mg @1200 /10mg qPM  6/22: Valium 10mg qAM/ 5mg @ 1200 /5mg qPM  6/23: Valium 5mg qAM/ 5mg @ 1200 /5mg qPM  6/24: Valium 5mg BID  6/25: Valium 5mg qHS      GERD (gastroesophageal reflux disease)  - continue home protonix      Hypernatremia  - likely 2/2 hypovolemic hypernatremia  - completed NS infusion   - RESOLVED       Acute hypoxemic respiratory failure  Patient with Hypoxic Respiratory failure which is Acute.  she is not on home oxygen. Supplemental oxygen was provided and noted- Oxygen Concentration (%):  [28] 28    .   Signs/symptoms of respiratory failure include- tachypnea and lethargy. Contributing diagnoses includes - Aspiration, COPD and Pneumonia Labs and images were reviewed. Patient Has not had a recent ABG. Will treat underlying causes and adjust management of  respiratory failure:    PLAN:  - not on oxygen at home   - O2 sat 88% on room air. On 2 L NC  - CXR nonacute   - followup TTE  - likely 2/2 tachypnea due to alcoholic ketosis   - wean O2 as tolerated     Migraine        CLL (chronic lymphocytic leukemia)  - Established with Dr. Gonzalez. Not currently on treatment.   - Chronic thrombocytopenia noted.  Possibly associated with alcohol use.   - Outpatient Heme-Onc follow up         Lymphocytosis        Type 2 diabetes mellitus with hyperglycemia  Patient's FSGs are controlled on current medication regimen.  Last A1c reviewed-   Lab Results   Component Value Date    HGBA1C 4.7 06/16/2023     Most recent fingerstick glucose reviewed-   Recent Labs   Lab 06/16/23  1228 06/16/23  2041   POCTGLUCOSE 77 75     Current correctional scale  Low  Maintain anti-hyperglycemic dose as follows-   Antihyperglycemics (From admission, onward)    Start     Stop Route Frequency Ordered    06/16/23 1658  insulin aspart U-100 pen 0-5 Units         -- SubQ Before meals & nightly PRN 06/16/23 1702        Hold Oral hypoglycemics while patient is in the hospital.  - Holding home metformin 1000mg BID      Anemia  - Hb on admit, 11  - baseline Hb 9-10  - likely hemoconcentration   - transfuse for Hb < 7  - CBC daily         COPD (chronic obstructive pulmonary disease)  - not on oxygen at home   - continue home Breo, combivent  - prn albuterol   - CXR negative   - wean O2 as tolerated   - O2 goal 8-92%         Malignant neoplasm of central portion of right breast in female, estrogen receptor positive  - T1b N0 stage IA breast cancer diagnosed October 2020.   - S/p bilateral mastectomy with no adjuvant therapy; received Letrozole February 2021-may 2021         VINICIO (obstructive sleep apnea)  - use CPAP nightly when AMS resolves       Suicidal ideation  - endorsed suicidal ideations on day of admission   - h/o SI, depression, anxiety and alcohol abuse   - PEC started on 6/16  - psychiatry consulted.  "followup recs      Hyperlipidemia  - not currently on a statin           History of sarcoidosis  - Patient with documented history of sarcoidosis.  Not currently on treatment.         Depression with anxiety  Patient has persistent depression which is moderate and is currently uncontrolled. Will Continue anti-depressant medications. We will consult psychiatry at this time. Patient does not display psychosis at this time. Continue to monitor closely and adjust plan of care as needed.    - continue home cymbalta and trazodone  - psychiatry consulted. apprec recs         History of substance abuse        Sarcoidosis  - Patient with documented history of sarcoidosis.  Not currently on treatment.      Fibromyalgia  - continue home cymbalta      Essential hypertension  - continue home lisiniopril    Alcohol use disorder, severe, dependence  - see "alcohol withdrawal"        VTE Risk Mitigation (From admission, onward)         Ordered     heparin (porcine) injection 5,000 Units  Every 8 hours         06/16/23 1702     IP VTE HIGH RISK PATIENT  Once         06/16/23 1702     Place sequential compression device  Until discontinued         06/16/23 1702                Discharge Planning   BECCA: 6/20/2023     Code Status: Full Code   Is the patient medically ready for discharge?: No    Reason for patient still in hospital (select all that apply): Patient unstable, Patient trending condition, Laboratory test, Treatment and Consult recommendations             Time spent in care of patient: > 35 minutes           Andrei Sosa MD  Department of Hospital Medicine   [facility] - Med Surg    "

## 2023-06-20 NOTE — PLAN OF CARE
Problem: Violence Risk or Actual  Goal: Anger and Impulse Control  Outcome: Ongoing, Progressing     Problem: Adult Inpatient Plan of Care  Goal: Plan of Care Review  Outcome: Ongoing, Progressing  Goal: Patient-Specific Goal (Individualized)  Outcome: Ongoing, Progressing  Goal: Absence of Hospital-Acquired Illness or Injury  Outcome: Ongoing, Progressing  Goal: Optimal Comfort and Wellbeing  Outcome: Ongoing, Progressing  Goal: Readiness for Transition of Care  Outcome: Ongoing, Progressing     Problem: Diabetes Comorbidity  Goal: Blood Glucose Level Within Targeted Range  Outcome: Ongoing, Progressing     Problem: Renal Function Impairment (Acute Kidney Injury/Impairment)  Goal: Effective Renal Function  Outcome: Ongoing, Progressing       Reviewed plan of care with emphasis on anxiety and CIWA scale.  Patient verbalized understanding and agreement with plan of care.

## 2023-06-20 NOTE — ASSESSMENT & PLAN NOTE
- She drinks about a bottle of vodka a day (750 ml).  - last drink day of admission on 6/16  - alcohol level on admit, 215  - followup PETH  - on CIWA protocol. CIWA score no higher than 11 on 6/19  - continue valium 10 mg q8h and prn ativan  - continue folate, thiamine, MVI  - replete electrolytes prn  - addiction psychiatry consulted. apprec recs  -- valium taper provided by psych as follows:  ? 6/20: Valium 10mg qAM/ 10mg @1200 /10mg qPM  6/21: Valium 10mg qAM/ 5mg @1200 /10mg qPM  6/22: Valium 10mg qAM/ 5mg @ 1200 /5mg qPM  6/23: Valium 5mg qAM/ 5mg @ 1200 /5mg qPM  6/24: Valium 5mg BID  6/25: Valium 5mg qHS

## 2023-06-20 NOTE — ASSESSMENT & PLAN NOTE
- She drinks about a bottle of vodka a day (750 ml).  - last drink day of admission on 6/16  - alcohol level on admit, 215  - followup PETH  - on CIWA protocol   - continue valium 10 mg q8h and prn ativan  - continue folate, thiamine, MVI  - replete electrolytes prn  - addiction psychiatry consulted. apprec recs  -- valium taper provided by psych as follows:  ? 6/20: Valium 10mg qAM/ 10mg @1200 /10mg qPM  6/21: Valium 10mg qAM/ 5mg @1200 /10mg qPM  6/22: Valium 10mg qAM/ 5mg @ 1200 /5mg qPM  6/23: Valium 5mg qAM/ 5mg @ 1200 /5mg qPM  6/24: Valium 5mg BID  6/25: Valium 5mg qHS

## 2023-06-20 NOTE — SUBJECTIVE & OBJECTIVE
Interval History: Patient lying in bed, no acute distress. No acute events overnight. Patient more alert today and conversant. Alert and oriented x3. Reports tremors and fatigue are improving. Tolerating valium taper. Per psych, PEC rescinded since no longer having SI.     Review of Systems   Constitutional:  Positive for fatigue. Negative for activity change, chills and fever.   HENT:  Negative for congestion and trouble swallowing.    Eyes:  Negative for visual disturbance.   Respiratory:  Negative for cough and shortness of breath.    Cardiovascular:  Negative for chest pain and leg swelling.   Gastrointestinal:  Negative for abdominal distention, abdominal pain, nausea and vomiting.   Endocrine: Negative for cold intolerance, heat intolerance, polydipsia and polyuria.   Genitourinary:  Negative for difficulty urinating and dysuria.   Musculoskeletal:  Negative for back pain and myalgias.   Skin:  Negative for rash and wound.   Neurological:  Positive for tremors and weakness. Negative for dizziness and light-headedness.   Hematological:  Negative for adenopathy. Does not bruise/bleed easily.   Psychiatric/Behavioral:  Positive for decreased concentration. Negative for confusion, sleep disturbance and suicidal ideas.    Objective:     Vital Signs (Most Recent):  Temp: 97.3 °F (36.3 °C) (06/19/23 1642)  Pulse: 82 (06/19/23 1642)  Resp: 18 (06/19/23 1642)  BP: (!) 170/81 (06/19/23 1642)  SpO2: 97 % (06/19/23 1642) Vital Signs (24h Range):  Temp:  [97.3 °F (36.3 °C)-98.2 °F (36.8 °C)] 97.3 °F (36.3 °C)  Pulse:  [71-85] 82  Resp:  [18] 18  SpO2:  [93 %-98 %] 97 %  BP: (108-180)/(55-86) 170/81     Weight: 73.9 kg (163 lb)  Body mass index is 26.31 kg/m².    Intake/Output Summary (Last 24 hours) at 6/19/2023 1956  Last data filed at 6/19/2023 1349  Gross per 24 hour   Intake 720 ml   Output --   Net 720 ml         Physical Exam  Constitutional:       Appearance: She is well-developed. She is ill-appearing.   HENT:       Head: Normocephalic and atraumatic.   Eyes:      General: No scleral icterus.     Extraocular Movements: Extraocular movements intact.      Pupils: Pupils are equal, round, and reactive to light.   Neck:      Vascular: No JVD.   Cardiovascular:      Rate and Rhythm: Normal rate and regular rhythm.      Heart sounds: No murmur heard.    No friction rub. No gallop.   Pulmonary:      Effort: Pulmonary effort is normal. No respiratory distress.      Breath sounds: Normal breath sounds. No wheezing.   Abdominal:      General: Bowel sounds are normal. There is no distension.      Palpations: Abdomen is soft.      Tenderness: There is no abdominal tenderness.   Musculoskeletal:         General: No deformity. Normal range of motion.      Cervical back: Neck supple.   Lymphadenopathy:      Cervical: No cervical adenopathy.   Skin:     General: Skin is warm and dry.      Capillary Refill: Capillary refill takes less than 2 seconds.      Findings: No erythema or rash.   Neurological:      General: No focal deficit present.      Mental Status: She is alert and oriented to person, place, and time. Mental status is at baseline.      Cranial Nerves: No cranial nerve deficit.      Sensory: No sensory deficit.   Psychiatric:         Attention and Perception: She is inattentive.         Cognition and Memory: Cognition is impaired. Memory is impaired.           Significant Labs: A1C:   Recent Labs   Lab 12/23/22  0511 03/26/23  1656 06/16/23  1817   HGBA1C 4.6 5.1 4.7       Blood Culture: No results for input(s): LABBLOO in the last 48 hours.  CBC:   Recent Labs   Lab 06/18/23  0425 06/19/23  0551   WBC 3.13* 3.76*   HGB 9.4* 9.9*   HCT 30.5* 31.6*   PLT 80* 87*       CMP:   Recent Labs   Lab 06/18/23  0425 06/19/23  0551    140   K 3.9 4.0    109   CO2 25 21*   * 125*   BUN 5* 5*   CREATININE 0.7 0.7   CALCIUM 8.2* 8.6*   ANIONGAP 8 10       Coagulation:   No results for input(s): PT, INR, APTT in the last 48  hours.    Lactic Acid: No results for input(s): LACTATE in the last 48 hours.  Magnesium:   Recent Labs   Lab 06/18/23  0425 06/19/23  0551   MG 1.7 1.6       POCT Glucose:   Recent Labs   Lab 06/19/23  0702 06/19/23  1145 06/19/23  1637   POCTGLUCOSE 111* 130* 155*       Troponin: No results for input(s): TROPONINI, TROPONINIHS in the last 48 hours.  TSH:   Recent Labs   Lab 06/16/23  0918   TSH 1.283           Significant Imaging: I have reviewed all pertinent imaging results/findings within the past 24 hours.

## 2023-06-20 NOTE — SUBJECTIVE & OBJECTIVE
Interval History: Patient lying in bed, no acute distress. No acute events overnight. Patient is close to baseline mental status. Reports tremors and fatigue continue to improve. Tolerating valium taper. Patient has been compliance with medical plan since being off PEC and no additional SI. Ambulating without difficulty. Notes good po intake, regular bowel movements and good oral hydration. CIWA scores have been up to 11. Psychiatry provided slow valium taper that will be followed prior to discharge.     Review of Systems   Constitutional:  Positive for fatigue. Negative for activity change, chills and fever.   HENT:  Negative for congestion and trouble swallowing.    Eyes:  Negative for visual disturbance.   Respiratory:  Negative for cough and shortness of breath.    Cardiovascular:  Negative for chest pain and leg swelling.   Gastrointestinal:  Negative for abdominal distention, abdominal pain, nausea and vomiting.   Endocrine: Negative for cold intolerance, heat intolerance, polydipsia and polyuria.   Genitourinary:  Negative for difficulty urinating and dysuria.   Musculoskeletal:  Negative for back pain and myalgias.   Skin:  Negative for rash and wound.   Neurological:  Positive for tremors and weakness. Negative for dizziness and light-headedness.   Hematological:  Negative for adenopathy. Does not bruise/bleed easily.   Psychiatric/Behavioral:  Positive for decreased concentration. Negative for confusion, sleep disturbance and suicidal ideas.      Objective:     Vital Signs (Most Recent):  Temp: 97.3 °F (36.3 °C) (06/19/23 1642)  Pulse: 82 (06/19/23 1642)  Resp: 18 (06/19/23 1642)  BP: (!) 170/81 (06/19/23 1642)  SpO2: 97 % (06/19/23 1642) Vital Signs (24h Range):  Temp:  [97.3 °F (36.3 °C)-98.1 °F (36.7 °C)] 97.3 °F (36.3 °C)  Pulse:  [71-85] 82  Resp:  [18] 18  SpO2:  [93 %-98 %] 97 %  BP: (108-170)/(55-81) 170/81     Weight: 73.9 kg (163 lb)  Body mass index is 26.31 kg/m².    Intake/Output Summary (Last  24 hours) at 6/19/2023 2004  Last data filed at 6/19/2023 1349  Gross per 24 hour   Intake 720 ml   Output --   Net 720 ml           Physical Exam  Constitutional:       Appearance: She is well-developed. She is ill-appearing.   HENT:      Head: Normocephalic and atraumatic.   Eyes:      General: No scleral icterus.     Extraocular Movements: Extraocular movements intact.      Pupils: Pupils are equal, round, and reactive to light.   Neck:      Vascular: No JVD.   Cardiovascular:      Rate and Rhythm: Normal rate and regular rhythm.      Heart sounds: No murmur heard.    No friction rub. No gallop.   Pulmonary:      Effort: Pulmonary effort is normal. No respiratory distress.      Breath sounds: Normal breath sounds. No wheezing.   Abdominal:      General: Bowel sounds are normal. There is no distension.      Palpations: Abdomen is soft.      Tenderness: There is no abdominal tenderness.   Musculoskeletal:         General: No deformity. Normal range of motion.      Cervical back: Neck supple.   Lymphadenopathy:      Cervical: No cervical adenopathy.   Skin:     General: Skin is warm and dry.      Capillary Refill: Capillary refill takes less than 2 seconds.      Findings: No erythema or rash.   Neurological:      General: No focal deficit present.      Mental Status: She is alert and oriented to person, place, and time. Mental status is at baseline.      Cranial Nerves: No cranial nerve deficit.      Sensory: No sensory deficit.   Psychiatric:         Attention and Perception: She is inattentive.         Cognition and Memory: Cognition is impaired. Memory is impaired.           Significant Labs: A1C:   Recent Labs   Lab 12/23/22  0511 03/26/23  1656 06/16/23  1817   HGBA1C 4.6 5.1 4.7       Blood Culture: No results for input(s): LABBLOO in the last 48 hours.  CBC:   Recent Labs   Lab 06/18/23  0425 06/19/23  0551   WBC 3.13* 3.76*   HGB 9.4* 9.9*   HCT 30.5* 31.6*   PLT 80* 87*       CMP:   Recent Labs   Lab  06/18/23  0425 06/19/23  0551    140   K 3.9 4.0    109   CO2 25 21*   * 125*   BUN 5* 5*   CREATININE 0.7 0.7   CALCIUM 8.2* 8.6*   ANIONGAP 8 10       Coagulation:   No results for input(s): PT, INR, APTT in the last 48 hours.    Lactic Acid: No results for input(s): LACTATE in the last 48 hours.  Magnesium:   Recent Labs   Lab 06/18/23  0425 06/19/23  0551   MG 1.7 1.6       POCT Glucose:   Recent Labs   Lab 06/19/23  0702 06/19/23  1145 06/19/23  1637   POCTGLUCOSE 111* 130* 155*       Troponin: No results for input(s): TROPONINI, TROPONINIHS in the last 48 hours.  TSH:   Recent Labs   Lab 06/16/23  0918   TSH 1.283           Significant Imaging: I have reviewed all pertinent imaging results/findings within the past 24 hours.

## 2023-06-20 NOTE — ASSESSMENT & PLAN NOTE
- endorsed suicidal ideations on day of admission   - h/o SI, depression, anxiety and alcohol abuse   - PEC started on 6/16  - psychiatry consulted. apprec recs  -- PEC rescinded on 6/18

## 2023-06-20 NOTE — PLAN OF CARE
Problem: Violence Risk or Actual  Goal: Anger and Impulse Control  Outcome: Ongoing, Progressing     Problem: Adult Inpatient Plan of Care  Goal: Plan of Care Review  Outcome: Ongoing, Progressing  Goal: Patient-Specific Goal (Individualized)  Outcome: Ongoing, Progressing  Goal: Absence of Hospital-Acquired Illness or Injury  Outcome: Ongoing, Progressing  Goal: Optimal Comfort and Wellbeing  Outcome: Ongoing, Progressing     Problem: Diabetes Comorbidity  Goal: Blood Glucose Level Within Targeted Range  Outcome: Ongoing, Progressing      Patient is AAOx4, not in any form of distress. Calm and cooperative. Fall prevention protocol maintained.

## 2023-06-21 LAB
ANION GAP SERPL CALC-SCNC: 8 MMOL/L (ref 8–16)
BASOPHILS NFR BLD: 0 % (ref 0–1.9)
BUN SERPL-MCNC: 8 MG/DL (ref 8–23)
CALCIUM SERPL-MCNC: 8.8 MG/DL (ref 8.7–10.5)
CHLORIDE SERPL-SCNC: 108 MMOL/L (ref 95–110)
CLINICAL BIOCHEMIST REVIEW: NORMAL
CO2 SERPL-SCNC: 24 MMOL/L (ref 23–29)
CREAT SERPL-MCNC: 0.8 MG/DL (ref 0.5–1.4)
DIFFERENTIAL METHOD: ABNORMAL
EOSINOPHIL NFR BLD: 4 % (ref 0–8)
ERYTHROCYTE [DISTWIDTH] IN BLOOD BY AUTOMATED COUNT: 17.7 % (ref 11.5–14.5)
EST. GFR  (NO RACE VARIABLE): >60 ML/MIN/1.73 M^2
GLUCOSE SERPL-MCNC: 111 MG/DL (ref 70–110)
HCT VFR BLD AUTO: 34.7 % (ref 37–48.5)
HGB BLD-MCNC: 10.5 G/DL (ref 12–16)
IMM GRANULOCYTES # BLD AUTO: ABNORMAL K/UL (ref 0–0.04)
IMM GRANULOCYTES NFR BLD AUTO: ABNORMAL % (ref 0–0.5)
LYMPHOCYTES NFR BLD: 66 % (ref 18–48)
MAGNESIUM SERPL-MCNC: 1.7 MG/DL (ref 1.6–2.6)
MCH RBC QN AUTO: 28.4 PG (ref 27–31)
MCHC RBC AUTO-ENTMCNC: 30.3 G/DL (ref 32–36)
MCV RBC AUTO: 94 FL (ref 82–98)
MONOCYTES NFR BLD: 3 % (ref 4–15)
NEUTROPHILS NFR BLD: 27 % (ref 38–73)
NRBC BLD-RTO: 0 /100 WBC
PHOSPHATE SERPL-MCNC: 3.2 MG/DL (ref 2.7–4.5)
PLATELET # BLD AUTO: 83 K/UL (ref 150–450)
PLATELET BLD QL SMEAR: ABNORMAL
PLPETH BLD-MCNC: 1367 NG/ML
PMV BLD AUTO: 10.6 FL (ref 9.2–12.9)
POCT GLUCOSE: 106 MG/DL (ref 70–110)
POCT GLUCOSE: 116 MG/DL (ref 70–110)
POCT GLUCOSE: 127 MG/DL (ref 70–110)
POCT GLUCOSE: 135 MG/DL (ref 70–110)
POPETH BLD-MCNC: 1099 NG/ML
POTASSIUM SERPL-SCNC: 4.1 MMOL/L (ref 3.5–5.1)
RBC # BLD AUTO: 3.7 M/UL (ref 4–5.4)
SMUDGE CELLS BLD QL SMEAR: PRESENT
SODIUM SERPL-SCNC: 140 MMOL/L (ref 136–145)
WBC # BLD AUTO: 3.53 K/UL (ref 3.9–12.7)

## 2023-06-21 PROCEDURE — 84100 ASSAY OF PHOSPHORUS: CPT | Performed by: INTERNAL MEDICINE

## 2023-06-21 PROCEDURE — 36415 COLL VENOUS BLD VENIPUNCTURE: CPT | Performed by: INTERNAL MEDICINE

## 2023-06-21 PROCEDURE — 99233 PR SUBSEQUENT HOSPITAL CARE,LEVL III: ICD-10-PCS | Mod: ,,, | Performed by: INTERNAL MEDICINE

## 2023-06-21 PROCEDURE — 85027 COMPLETE CBC AUTOMATED: CPT | Performed by: INTERNAL MEDICINE

## 2023-06-21 PROCEDURE — 85007 BL SMEAR W/DIFF WBC COUNT: CPT | Performed by: INTERNAL MEDICINE

## 2023-06-21 PROCEDURE — 99233 SBSQ HOSP IP/OBS HIGH 50: CPT | Mod: ,,, | Performed by: INTERNAL MEDICINE

## 2023-06-21 PROCEDURE — 21400001 HC TELEMETRY ROOM

## 2023-06-21 PROCEDURE — 94761 N-INVAS EAR/PLS OXIMETRY MLT: CPT

## 2023-06-21 PROCEDURE — 25000003 PHARM REV CODE 250: Performed by: INTERNAL MEDICINE

## 2023-06-21 PROCEDURE — 83735 ASSAY OF MAGNESIUM: CPT | Performed by: INTERNAL MEDICINE

## 2023-06-21 PROCEDURE — 80048 BASIC METABOLIC PNL TOTAL CA: CPT | Performed by: INTERNAL MEDICINE

## 2023-06-21 PROCEDURE — 94640 AIRWAY INHALATION TREATMENT: CPT

## 2023-06-21 PROCEDURE — 63600175 PHARM REV CODE 636 W HCPCS: Performed by: INTERNAL MEDICINE

## 2023-06-21 RX ORDER — BUSPIRONE HYDROCHLORIDE 10 MG/1
10 TABLET ORAL 2 TIMES DAILY
Status: DISCONTINUED | OUTPATIENT
Start: 2023-06-21 | End: 2023-06-22 | Stop reason: HOSPADM

## 2023-06-21 RX ADMIN — FLUTICASONE FUROATE AND VILANTEROL TRIFENATATE 1 PUFF: 100; 25 POWDER RESPIRATORY (INHALATION) at 10:06

## 2023-06-21 RX ADMIN — FOLIC ACID 1 MG: 1 TABLET ORAL at 09:06

## 2023-06-21 RX ADMIN — DIAZEPAM 10 MG: 5 TABLET ORAL at 05:06

## 2023-06-21 RX ADMIN — LISINOPRIL 20 MG: 20 TABLET ORAL at 09:06

## 2023-06-21 RX ADMIN — LEVOTHYROXINE SODIUM 50 MCG: 50 TABLET ORAL at 05:06

## 2023-06-21 RX ADMIN — HEPARIN SODIUM 5000 UNITS: 5000 INJECTION INTRAVENOUS; SUBCUTANEOUS at 01:06

## 2023-06-21 RX ADMIN — LORAZEPAM 2 MG: 2 INJECTION INTRAMUSCULAR; INTRAVENOUS at 09:06

## 2023-06-21 RX ADMIN — DIAZEPAM 10 MG: 5 TABLET ORAL at 09:06

## 2023-06-21 RX ADMIN — BUSPIRONE HYDROCHLORIDE 10 MG: 10 TABLET ORAL at 04:06

## 2023-06-21 RX ADMIN — Medication 100 MG: at 09:06

## 2023-06-21 RX ADMIN — MUPIROCIN: 20 OINTMENT TOPICAL at 09:06

## 2023-06-21 RX ADMIN — PANTOPRAZOLE SODIUM 40 MG: 40 TABLET, DELAYED RELEASE ORAL at 09:06

## 2023-06-21 RX ADMIN — TRAZODONE HYDROCHLORIDE 300 MG: 100 TABLET ORAL at 09:06

## 2023-06-21 RX ADMIN — DULOXETINE HYDROCHLORIDE 60 MG: 60 CAPSULE, DELAYED RELEASE ORAL at 09:06

## 2023-06-21 RX ADMIN — BUSPIRONE HYDROCHLORIDE 10 MG: 10 TABLET ORAL at 09:06

## 2023-06-21 RX ADMIN — HEPARIN SODIUM 5000 UNITS: 5000 INJECTION INTRAVENOUS; SUBCUTANEOUS at 09:06

## 2023-06-21 RX ADMIN — DIAZEPAM 5 MG: 5 TABLET ORAL at 01:06

## 2023-06-21 RX ADMIN — HEPARIN SODIUM 5000 UNITS: 5000 INJECTION INTRAVENOUS; SUBCUTANEOUS at 05:06

## 2023-06-21 RX ADMIN — THERA TABS 1 TABLET: TAB at 09:06

## 2023-06-21 NOTE — SUBJECTIVE & OBJECTIVE
Interval History: Patient lying in bed, no acute distress. No acute events overnight. Patient is close to baseline mental status. Reports tremors and fatigue continue to improve. Tolerating valium taper.     Review of Systems   Constitutional:  Positive for fatigue. Negative for activity change, chills and fever.   HENT:  Negative for congestion and trouble swallowing.    Eyes:  Negative for visual disturbance.   Respiratory:  Negative for cough and shortness of breath.    Cardiovascular:  Negative for chest pain and leg swelling.   Gastrointestinal:  Negative for abdominal distention, abdominal pain, nausea and vomiting.   Endocrine: Negative for cold intolerance, heat intolerance, polydipsia and polyuria.   Genitourinary:  Negative for difficulty urinating and dysuria.   Musculoskeletal:  Negative for back pain and myalgias.   Skin:  Negative for rash and wound.   Neurological:  Positive for tremors and weakness. Negative for dizziness and light-headedness.   Hematological:  Negative for adenopathy. Does not bruise/bleed easily.   Psychiatric/Behavioral:  Positive for decreased concentration. Negative for confusion, sleep disturbance and suicidal ideas.      Objective:     Vital Signs (Most Recent):  Temp: 98 °F (36.7 °C) (06/20/23 1524)  Pulse: 72 (06/20/23 1957)  Resp: 18 (06/20/23 1957)  BP: (!) 188/86 (06/20/23 1957)  SpO2: (!) 94 % (06/20/23 1957) Vital Signs (24h Range):  Temp:  [97.9 °F (36.6 °C)-98.4 °F (36.9 °C)] 98 °F (36.7 °C)  Pulse:  [63-88] 72  Resp:  [17-18] 18  SpO2:  [94 %-98 %] 94 %  BP: (104-188)/(53-86) 188/86     Weight: 73.9 kg (163 lb)  Body mass index is 26.31 kg/m².    Intake/Output Summary (Last 24 hours) at 6/20/2023 2227  Last data filed at 6/20/2023 1400  Gross per 24 hour   Intake 500 ml   Output --   Net 500 ml           Physical Exam  Constitutional:       Appearance: She is well-developed. She is ill-appearing.   HENT:      Head: Normocephalic and atraumatic.   Eyes:      General:  No scleral icterus.     Extraocular Movements: Extraocular movements intact.      Pupils: Pupils are equal, round, and reactive to light.   Neck:      Vascular: No JVD.   Cardiovascular:      Rate and Rhythm: Normal rate and regular rhythm.      Heart sounds: No murmur heard.    No friction rub. No gallop.   Pulmonary:      Effort: Pulmonary effort is normal. No respiratory distress.      Breath sounds: Normal breath sounds. No wheezing.   Abdominal:      General: Bowel sounds are normal. There is no distension.      Palpations: Abdomen is soft.      Tenderness: There is no abdominal tenderness.   Musculoskeletal:         General: No deformity. Normal range of motion.      Cervical back: Neck supple.   Lymphadenopathy:      Cervical: No cervical adenopathy.   Skin:     General: Skin is warm and dry.      Capillary Refill: Capillary refill takes less than 2 seconds.      Findings: No erythema or rash.   Neurological:      General: No focal deficit present.      Mental Status: She is alert and oriented to person, place, and time. Mental status is at baseline.      Cranial Nerves: No cranial nerve deficit.      Sensory: No sensory deficit.   Psychiatric:         Attention and Perception: She is inattentive.         Cognition and Memory: Cognition is impaired. Memory is impaired.           Significant Labs: A1C:   Recent Labs   Lab 12/23/22  0511 03/26/23  1656 06/16/23  1817   HGBA1C 4.6 5.1 4.7       Blood Culture: No results for input(s): LABBLOO in the last 48 hours.  CBC:   Recent Labs   Lab 06/19/23  0551 06/20/23  0326   WBC 3.76* 3.39*   HGB 9.9* 9.6*   HCT 31.6* 31.3*   PLT 87* 83*       CMP:   Recent Labs   Lab 06/19/23  0551 06/20/23  0326    139   K 4.0 3.9    109   CO2 21* 23   * 142*   BUN 5* 7*   CREATININE 0.7 0.7   CALCIUM 8.6* 8.5*   ANIONGAP 10 7*       Coagulation:   No results for input(s): PT, INR, APTT in the last 48 hours.    Lactic Acid: No results for input(s): LACTATE in the  last 48 hours.  Magnesium:   Recent Labs   Lab 06/19/23  0551 06/20/23  0326   MG 1.6 1.7       POCT Glucose:   Recent Labs   Lab 06/20/23  1111 06/20/23  1608 06/20/23  1959   POCTGLUCOSE 144* 100 108       Troponin: No results for input(s): TROPONINI, TROPONINIHS in the last 48 hours.  TSH:   Recent Labs   Lab 06/16/23  0918   TSH 1.283           Significant Imaging: I have reviewed all pertinent imaging results/findings within the past 24 hours.

## 2023-06-21 NOTE — PROGRESS NOTES
[facility] Medicine  Progress Note    Patient Name: Earl Abdul  MRN: [id number]  Patient Class: IP- Inpatient   Admission Date: 6/16/2023  Length of Stay: 2 days  Attending Physician: Andrei Sosa MD  Primary Care Provider: Andrew Rodriguez MD        Subjective:     Principal Problem:Alcohol withdrawal syndrome with complication        HPI:  Ms. Abdul is a 63 year old woman with PMH EtOH use disorder with history of prior seizures, multiple admissions for alcohol withdrawal, depression with suicide attempt in 2017, breast cancer, HTN, HLD, fibromyalgia, sarcoidosis, hypothyroidism, T2DM, CLL (not on treatment) and COPD who presented to [facility] with chief complaint of multiple falls.  Patient drank a lot of alcohol today, was walking around and lost her balance and fell. She reports that he did hit her head and loss consciousness. She endorsed facial pain and notes abrasion below right eye. She denies chest pain, abdominal pain, shortness breath, urinary symptoms, vomiting, numbness or weakness in her arms or legs. She drinks about a bottle of vodka a day (750 ml).    In the ED, she was afebrile, tachycardic to 108, tachypneic with RR 22, and O2 sat at 88% and was placed on 2 L NC. She was alert and oriented x3 and denied hallucinations. CT head showed no concern for intracranial hemorrhage. CT maxillofacial showed previous nasal fracture, no acute fractures. CT C-spine showed no concern for acute fractures. While in the ED, she later endorsed SI and she was PEC'd.     CBC showed WBC 17, CMP showed no electrolyte abnormalities and UA showed no concern for UTI. UDS positive for benzodiazepines. Ethanol level elevated at 215. TSH within normal limits. Patient started developing tremors, concern for alcohol withdrawal and was given a dose of valium 10 mg and subsequently given 1 mg Ativan. Addiction psychiatry consulted and patient was started on CIWA protocol. She was admitted to  Hospital Medicine service for further evaluation and management of alcohol withdrawal.       Overview/Hospital Course:  No notes on file    Interval History: Patient lying in bed, no acute distress. No acute events overnight. Patient is close to baseline mental status. Reports tremors and fatigue continue to improve. Tolerating valium taper.     Review of Systems   Constitutional:  Positive for fatigue. Negative for activity change, chills and fever.   HENT:  Negative for congestion and trouble swallowing.    Eyes:  Negative for visual disturbance.   Respiratory:  Negative for cough and shortness of breath.    Cardiovascular:  Negative for chest pain and leg swelling.   Gastrointestinal:  Negative for abdominal distention, abdominal pain, nausea and vomiting.   Endocrine: Negative for cold intolerance, heat intolerance, polydipsia and polyuria.   Genitourinary:  Negative for difficulty urinating and dysuria.   Musculoskeletal:  Negative for back pain and myalgias.   Skin:  Negative for rash and wound.   Neurological:  Positive for tremors and weakness. Negative for dizziness and light-headedness.   Hematological:  Negative for adenopathy. Does not bruise/bleed easily.   Psychiatric/Behavioral:  Positive for decreased concentration. Negative for confusion, sleep disturbance and suicidal ideas.      Objective:     Vital Signs (Most Recent):  Temp: 98 °F (36.7 °C) (06/20/23 1524)  Pulse: 72 (06/20/23 1957)  Resp: 18 (06/20/23 1957)  BP: (!) 188/86 (06/20/23 1957)  SpO2: (!) 94 % (06/20/23 1957) Vital Signs (24h Range):  Temp:  [97.9 °F (36.6 °C)-98.4 °F (36.9 °C)] 98 °F (36.7 °C)  Pulse:  [63-88] 72  Resp:  [17-18] 18  SpO2:  [94 %-98 %] 94 %  BP: (104-188)/(53-86) 188/86     Weight: 73.9 kg (163 lb)  Body mass index is 26.31 kg/m².    Intake/Output Summary (Last 24 hours) at 6/20/2023 2226  Last data filed at 6/20/2023 1400  Gross per 24 hour   Intake 500 ml   Output --   Net 500 ml           Physical  Exam  Constitutional:       Appearance: She is well-developed. She is ill-appearing.   HENT:      Head: Normocephalic and atraumatic.   Eyes:      General: No scleral icterus.     Extraocular Movements: Extraocular movements intact.      Pupils: Pupils are equal, round, and reactive to light.   Neck:      Vascular: No JVD.   Cardiovascular:      Rate and Rhythm: Normal rate and regular rhythm.      Heart sounds: No murmur heard.    No friction rub. No gallop.   Pulmonary:      Effort: Pulmonary effort is normal. No respiratory distress.      Breath sounds: Normal breath sounds. No wheezing.   Abdominal:      General: Bowel sounds are normal. There is no distension.      Palpations: Abdomen is soft.      Tenderness: There is no abdominal tenderness.   Musculoskeletal:         General: No deformity. Normal range of motion.      Cervical back: Neck supple.   Lymphadenopathy:      Cervical: No cervical adenopathy.   Skin:     General: Skin is warm and dry.      Capillary Refill: Capillary refill takes less than 2 seconds.      Findings: No erythema or rash.   Neurological:      General: No focal deficit present.      Mental Status: She is alert and oriented to person, place, and time. Mental status is at baseline.      Cranial Nerves: No cranial nerve deficit.      Sensory: No sensory deficit.   Psychiatric:         Attention and Perception: She is inattentive.         Cognition and Memory: Cognition is impaired. Memory is impaired.           Significant Labs: A1C:   Recent Labs   Lab 12/23/22  0511 03/26/23  1656 06/16/23  1817   HGBA1C 4.6 5.1 4.7       Blood Culture: No results for input(s): LABBLOO in the last 48 hours.  CBC:   Recent Labs   Lab 06/19/23  0551 06/20/23  0326   WBC 3.76* 3.39*   HGB 9.9* 9.6*   HCT 31.6* 31.3*   PLT 87* 83*       CMP:   Recent Labs   Lab 06/19/23  0551 06/20/23  0326    139   K 4.0 3.9    109   CO2 21* 23   * 142*   BUN 5* 7*   CREATININE 0.7 0.7   CALCIUM 8.6*  8.5*   ANIONGAP 10 7*       Coagulation:   No results for input(s): PT, INR, APTT in the last 48 hours.    Lactic Acid: No results for input(s): LACTATE in the last 48 hours.  Magnesium:   Recent Labs   Lab 06/19/23  0551 06/20/23  0326   MG 1.6 1.7       POCT Glucose:   Recent Labs   Lab 06/20/23  1111 06/20/23  1608 06/20/23  1959   POCTGLUCOSE 144* 100 108       Troponin: No results for input(s): TROPONINI, TROPONINIHS in the last 48 hours.  TSH:   Recent Labs   Lab 06/16/23  0918   TSH 1.283           Significant Imaging: I have reviewed all pertinent imaging results/findings within the past 24 hours.      Assessment/Plan:      * Alcohol withdrawal syndrome with complication  - She drinks about a bottle of vodka a day (750 ml).  - last drink day of admission on 6/16  - alcohol level on admit, 215  - followup PETH  - on CIWA protocol. CIWA score no higher than 11 on 6/19  - continue valium 10 mg q8h and prn ativan  - continue folate, thiamine, MVI  - replete electrolytes prn  - addiction psychiatry consulted. apprec recs  -- valium taper provided by psych as follows:  ? 6/20: Valium 10mg qAM/ 10mg @1200 /10mg qPM  6/21: Valium 10mg qAM/ 5mg @1200 /10mg qPM  6/22: Valium 10mg qAM/ 5mg @ 1200 /5mg qPM  6/23: Valium 5mg qAM/ 5mg @ 1200 /5mg qPM  6/24: Valium 5mg BID  6/25: Valium 5mg qHS      GERD (gastroesophageal reflux disease)  - continue home protonix      Hypernatremia  - likely 2/2 hypovolemic hypernatremia  - completed NS infusion   - RESOLVED       Acute hypoxemic respiratory failure  Patient with Hypoxic Respiratory failure which is Acute.  she is not on home oxygen. Supplemental oxygen was provided and noted- Oxygen Concentration (%):  [28] 28    .   Signs/symptoms of respiratory failure include- tachypnea and lethargy. Contributing diagnoses includes - Aspiration, COPD and Pneumonia Labs and images were reviewed. Patient Has not had a recent ABG. Will treat underlying causes and adjust management of  respiratory failure:    PLAN:  - not on oxygen at home   - O2 sat 88% on room air. On 2 L NC  - CXR nonacute   - followup TTE  - likely 2/2 tachypnea due to alcoholic ketosis   - wean O2 as tolerated     Migraine        CLL (chronic lymphocytic leukemia)  - Established with Dr. Gonzalez. Not currently on treatment.   - Chronic thrombocytopenia noted.  Possibly associated with alcohol use.   - Outpatient Heme-Onc follow up         Lymphocytosis        Type 2 diabetes mellitus with hyperglycemia  Patient's FSGs are controlled on current medication regimen.  Last A1c reviewed-   Lab Results   Component Value Date    HGBA1C 4.7 06/16/2023     Most recent fingerstick glucose reviewed-   Recent Labs   Lab 06/16/23  1228 06/16/23  2041   POCTGLUCOSE 77 75     Current correctional scale  Low  Maintain anti-hyperglycemic dose as follows-   Antihyperglycemics (From admission, onward)    Start     Stop Route Frequency Ordered    06/16/23 1658  insulin aspart U-100 pen 0-5 Units         -- SubQ Before meals & nightly PRN 06/16/23 1702        Hold Oral hypoglycemics while patient is in the hospital.  - Holding home metformin 1000mg BID      Anemia  - Hb on admit, 11  - baseline Hb 9-10  - likely hemoconcentration   - transfuse for Hb < 7  - CBC daily         COPD (chronic obstructive pulmonary disease)  - not on oxygen at home   - continue home Breo, combivent  - prn albuterol   - CXR negative   - wean O2 as tolerated   - O2 goal 8-92%         Malignant neoplasm of central portion of right breast in female, estrogen receptor positive  - T1b N0 stage IA breast cancer diagnosed October 2020.   - S/p bilateral mastectomy with no adjuvant therapy; received Letrozole February 2021-may 2021         VINICIO (obstructive sleep apnea)  - use CPAP nightly when AMS resolves       Suicidal ideation  - endorsed suicidal ideations on day of admission   - h/o SI, depression, anxiety and alcohol abuse   - PEC started on 6/16  - psychiatry consulted.  "apprec recs  -- PEC rescinded on 6/18      Hyperlipidemia  - not currently on a statin           History of sarcoidosis  - Patient with documented history of sarcoidosis.  Not currently on treatment.         Depression with anxiety  Patient has persistent depression which is moderate and is currently uncontrolled. Will Continue anti-depressant medications. We will consult psychiatry at this time. Patient does not display psychosis at this time. Continue to monitor closely and adjust plan of care as needed.    - continue home cymbalta and trazodone  - psychiatry consulted. apprec recs         History of substance abuse        Sarcoidosis  - Patient with documented history of sarcoidosis.  Not currently on treatment.      Fibromyalgia  - continue home cymbalta      Essential hypertension  - continue home lisiniopril    Alcohol use disorder, severe, dependence  - see "alcohol withdrawal"        VTE Risk Mitigation (From admission, onward)         Ordered     heparin (porcine) injection 5,000 Units  Every 8 hours         06/16/23 1702     IP VTE HIGH RISK PATIENT  Once         06/16/23 1702     Place sequential compression device  Until discontinued         06/16/23 1702                Discharge Planning   BECCA: 6/25/2023     Code Status: Full Code   Is the patient medically ready for discharge?: No    Reason for patient still in hospital (select all that apply): Patient unstable, Patient trending condition, Laboratory test, Treatment and Consult recommendations  Discharge Plan A: Home with family   Discharge Delays: None known at this time      Time spent in care of patient: > 35 minutes           Andrei Sosa MD  Department of Hospital Medicine   [facility] - Med Surg    "

## 2023-06-21 NOTE — PLAN OF CARE
Problem: Adult Inpatient Plan of Care  Goal: Plan of Care Review  Outcome: Ongoing, Progressing  Goal: Patient-Specific Goal (Individualized)  Outcome: Ongoing, Progressing  Goal: Absence of Hospital-Acquired Illness or Injury  Outcome: Ongoing, Progressing  Goal: Optimal Comfort and Wellbeing  Outcome: Ongoing, Progressing     Problem: Diabetes Comorbidity  Goal: Blood Glucose Level Within Targeted Range  Outcome: Ongoing, Progressing     Problem: Skin Injury Risk Increased  Goal: Skin Health and Integrity  Outcome: Ongoing, Progressing         Patient is AAOx4, no cognitive impairment noted. Fall prevention protocol maintained.

## 2023-06-22 VITALS
RESPIRATION RATE: 18 BRPM | HEART RATE: 96 BPM | HEIGHT: 66 IN | DIASTOLIC BLOOD PRESSURE: 56 MMHG | OXYGEN SATURATION: 97 % | BODY MASS INDEX: 26.2 KG/M2 | WEIGHT: 163 LBS | SYSTOLIC BLOOD PRESSURE: 124 MMHG | TEMPERATURE: 97 F

## 2023-06-22 PROBLEM — J96.01 ACUTE HYPOXEMIC RESPIRATORY FAILURE: Status: RESOLVED | Noted: 2023-06-16 | Resolved: 2023-06-22

## 2023-06-22 PROBLEM — R45.851 SUICIDAL IDEATION: Status: RESOLVED | Noted: 2019-10-26 | Resolved: 2023-06-22

## 2023-06-22 LAB
ANION GAP SERPL CALC-SCNC: 9 MMOL/L (ref 8–16)
ANISOCYTOSIS BLD QL SMEAR: SLIGHT
BASOPHILS # BLD AUTO: 0.02 K/UL (ref 0–0.2)
BASOPHILS NFR BLD: 0.5 % (ref 0–1.9)
BUN SERPL-MCNC: 12 MG/DL (ref 8–23)
CALCIUM SERPL-MCNC: 8.9 MG/DL (ref 8.7–10.5)
CHLORIDE SERPL-SCNC: 110 MMOL/L (ref 95–110)
CO2 SERPL-SCNC: 23 MMOL/L (ref 23–29)
CREAT SERPL-MCNC: 0.7 MG/DL (ref 0.5–1.4)
DIFFERENTIAL METHOD: ABNORMAL
EOSINOPHIL # BLD AUTO: 0.1 K/UL (ref 0–0.5)
EOSINOPHIL NFR BLD: 1.9 % (ref 0–8)
ERYTHROCYTE [DISTWIDTH] IN BLOOD BY AUTOMATED COUNT: 17.5 % (ref 11.5–14.5)
EST. GFR  (NO RACE VARIABLE): >60 ML/MIN/1.73 M^2
GLUCOSE SERPL-MCNC: 87 MG/DL (ref 70–110)
HCT VFR BLD AUTO: 34.7 % (ref 37–48.5)
HGB BLD-MCNC: 10.4 G/DL (ref 12–16)
HYPOCHROMIA BLD QL SMEAR: ABNORMAL
IMM GRANULOCYTES # BLD AUTO: 0.01 K/UL (ref 0–0.04)
IMM GRANULOCYTES NFR BLD AUTO: 0.3 % (ref 0–0.5)
LYMPHOCYTES # BLD AUTO: 2.3 K/UL (ref 1–4.8)
LYMPHOCYTES NFR BLD: 60.2 % (ref 18–48)
MAGNESIUM SERPL-MCNC: 1.8 MG/DL (ref 1.6–2.6)
MCH RBC QN AUTO: 28 PG (ref 27–31)
MCHC RBC AUTO-ENTMCNC: 30 G/DL (ref 32–36)
MCV RBC AUTO: 93 FL (ref 82–98)
MONOCYTES # BLD AUTO: 0.4 K/UL (ref 0.3–1)
MONOCYTES NFR BLD: 9.8 % (ref 4–15)
NEUTROPHILS # BLD AUTO: 1 K/UL (ref 1.8–7.7)
NEUTROPHILS NFR BLD: 27.3 % (ref 38–73)
NRBC BLD-RTO: 0 /100 WBC
OVALOCYTES BLD QL SMEAR: ABNORMAL
PHOSPHATE SERPL-MCNC: 3.8 MG/DL (ref 2.7–4.5)
PLATELET # BLD AUTO: 87 K/UL (ref 150–450)
PLATELET BLD QL SMEAR: ABNORMAL
PMV BLD AUTO: 10.7 FL (ref 9.2–12.9)
POCT GLUCOSE: 101 MG/DL (ref 70–110)
POCT GLUCOSE: 172 MG/DL (ref 70–110)
POIKILOCYTOSIS BLD QL SMEAR: SLIGHT
POLYCHROMASIA BLD QL SMEAR: ABNORMAL
POTASSIUM SERPL-SCNC: 4.1 MMOL/L (ref 3.5–5.1)
RBC # BLD AUTO: 3.72 M/UL (ref 4–5.4)
SODIUM SERPL-SCNC: 142 MMOL/L (ref 136–145)
SPHEROCYTES BLD QL SMEAR: ABNORMAL
WBC # BLD AUTO: 3.77 K/UL (ref 3.9–12.7)

## 2023-06-22 PROCEDURE — 80048 BASIC METABOLIC PNL TOTAL CA: CPT | Performed by: INTERNAL MEDICINE

## 2023-06-22 PROCEDURE — 94761 N-INVAS EAR/PLS OXIMETRY MLT: CPT

## 2023-06-22 PROCEDURE — 99239 HOSP IP/OBS DSCHRG MGMT >30: CPT | Mod: ,,, | Performed by: HOSPITALIST

## 2023-06-22 PROCEDURE — 84100 ASSAY OF PHOSPHORUS: CPT | Performed by: INTERNAL MEDICINE

## 2023-06-22 PROCEDURE — 99239 PR HOSPITAL DISCHARGE DAY,>30 MIN: ICD-10-PCS | Mod: ,,, | Performed by: HOSPITALIST

## 2023-06-22 PROCEDURE — 25000003 PHARM REV CODE 250: Performed by: INTERNAL MEDICINE

## 2023-06-22 PROCEDURE — 85025 COMPLETE CBC W/AUTO DIFF WBC: CPT | Performed by: INTERNAL MEDICINE

## 2023-06-22 PROCEDURE — 63600175 PHARM REV CODE 636 W HCPCS: Performed by: INTERNAL MEDICINE

## 2023-06-22 PROCEDURE — 83735 ASSAY OF MAGNESIUM: CPT | Performed by: INTERNAL MEDICINE

## 2023-06-22 PROCEDURE — 94640 AIRWAY INHALATION TREATMENT: CPT

## 2023-06-22 PROCEDURE — 36415 COLL VENOUS BLD VENIPUNCTURE: CPT | Performed by: INTERNAL MEDICINE

## 2023-06-22 RX ORDER — GABAPENTIN 600 MG/1
600 TABLET ORAL 3 TIMES DAILY
Qty: 30 TABLET | Refills: 0 | Status: ON HOLD
Start: 2023-06-22 | End: 2023-11-06 | Stop reason: HOSPADM

## 2023-06-22 RX ORDER — ONDANSETRON 4 MG/1
4 TABLET, ORALLY DISINTEGRATING ORAL EVERY 6 HOURS PRN
Qty: 12 TABLET | Refills: 0 | Status: ON HOLD | OUTPATIENT
Start: 2023-06-22 | End: 2023-11-24 | Stop reason: DRUGHIGH

## 2023-06-22 RX ORDER — DIAZEPAM 5 MG/1
TABLET ORAL
Qty: 7 TABLET | Refills: 0 | Status: ON HOLD | OUTPATIENT
Start: 2023-06-22 | End: 2023-07-21 | Stop reason: HOSPADM

## 2023-06-22 RX ADMIN — MUPIROCIN: 20 OINTMENT TOPICAL at 08:06

## 2023-06-22 RX ADMIN — DULOXETINE HYDROCHLORIDE 60 MG: 60 CAPSULE, DELAYED RELEASE ORAL at 08:06

## 2023-06-22 RX ADMIN — DIAZEPAM 10 MG: 5 TABLET ORAL at 06:06

## 2023-06-22 RX ADMIN — BUSPIRONE HYDROCHLORIDE 10 MG: 10 TABLET ORAL at 08:06

## 2023-06-22 RX ADMIN — FOLIC ACID 1 MG: 1 TABLET ORAL at 08:06

## 2023-06-22 RX ADMIN — HEPARIN SODIUM 5000 UNITS: 5000 INJECTION INTRAVENOUS; SUBCUTANEOUS at 06:06

## 2023-06-22 RX ADMIN — Medication 100 MG: at 08:06

## 2023-06-22 RX ADMIN — THERA TABS 1 TABLET: TAB at 08:06

## 2023-06-22 RX ADMIN — DIAZEPAM 5 MG: 5 TABLET ORAL at 01:06

## 2023-06-22 RX ADMIN — LISINOPRIL 20 MG: 20 TABLET ORAL at 08:06

## 2023-06-22 RX ADMIN — PANTOPRAZOLE SODIUM 40 MG: 40 TABLET, DELAYED RELEASE ORAL at 08:06

## 2023-06-22 RX ADMIN — LEVOTHYROXINE SODIUM 50 MCG: 50 TABLET ORAL at 06:06

## 2023-06-22 RX ADMIN — FLUTICASONE FUROATE AND VILANTEROL TRIFENATATE 1 PUFF: 100; 25 POWDER RESPIRATORY (INHALATION) at 08:06

## 2023-06-22 NOTE — DISCHARGE INSTRUCTIONS
Please take the valium taper as prescribed. Take one tablet tonight, followed by 1 tablet three times a day tomorrow, then 1 tablet twice a day on 6/24 then 1 tablet on 6/25 night then stop. Please don't drink while taking valium. See the psychiatrist and enrol yourself in the outpatient alcohol abuse treatments programs [facility].

## 2023-06-22 NOTE — SUBJECTIVE & OBJECTIVE
Interval History: Patient lying in bed, no acute distress. No acute events overnight. Patient at baseline mental status. Reports tremors, fatigue and anxiety. Tolerating valium taper per psychiatry recommendations. Started buspirone for anxiety.     Review of Systems   Constitutional:  Positive for fatigue. Negative for activity change, chills and fever.   HENT:  Negative for congestion and trouble swallowing.    Eyes:  Negative for visual disturbance.   Respiratory:  Negative for cough and shortness of breath.    Cardiovascular:  Negative for chest pain and leg swelling.   Gastrointestinal:  Negative for abdominal distention, abdominal pain, nausea and vomiting.   Endocrine: Negative for cold intolerance, heat intolerance, polydipsia and polyuria.   Genitourinary:  Negative for difficulty urinating and dysuria.   Musculoskeletal:  Negative for back pain and myalgias.   Skin:  Negative for rash and wound.   Neurological:  Positive for tremors and weakness. Negative for dizziness and light-headedness.   Hematological:  Negative for adenopathy. Does not bruise/bleed easily.   Psychiatric/Behavioral:  Positive for decreased concentration. Negative for confusion, sleep disturbance and suicidal ideas. The patient is nervous/anxious.      Objective:     Vital Signs (Most Recent):  Temp: 97.9 °F (36.6 °C) (06/22/23 0454)  Pulse: 70 (06/22/23 0454)  Resp: 18 (06/22/23 0454)  BP: (!) 141/75 (06/22/23 0454)  SpO2: (!) 93 % (06/22/23 0454) Vital Signs (24h Range):  Temp:  [96.8 °F (36 °C)-98 °F (36.7 °C)] 97.9 °F (36.6 °C)  Pulse:  [64-83] 70  Resp:  [14-20] 18  SpO2:  [91 %-97 %] 93 %  BP: (116-201)/(56-83) 141/75     Weight: 73.9 kg (163 lb)  Body mass index is 26.31 kg/m².  No intake or output data in the 24 hours ending 06/22/23 0556        Physical Exam  Constitutional:       Appearance: She is well-developed. She is ill-appearing.   HENT:      Head: Normocephalic and atraumatic.   Eyes:      General: No scleral  icterus.     Extraocular Movements: Extraocular movements intact.      Pupils: Pupils are equal, round, and reactive to light.   Neck:      Vascular: No JVD.   Cardiovascular:      Rate and Rhythm: Normal rate and regular rhythm.      Heart sounds: No murmur heard.    No friction rub. No gallop.   Pulmonary:      Effort: Pulmonary effort is normal. No respiratory distress.      Breath sounds: Normal breath sounds. No wheezing.   Abdominal:      General: Bowel sounds are normal. There is no distension.      Palpations: Abdomen is soft.      Tenderness: There is no abdominal tenderness.   Musculoskeletal:         General: No deformity. Normal range of motion.      Cervical back: Neck supple.   Lymphadenopathy:      Cervical: No cervical adenopathy.   Skin:     General: Skin is warm and dry.      Capillary Refill: Capillary refill takes less than 2 seconds.      Findings: No erythema or rash.   Neurological:      General: No focal deficit present.      Mental Status: She is alert and oriented to person, place, and time. Mental status is at baseline.      Cranial Nerves: No cranial nerve deficit.      Sensory: No sensory deficit.      Comments: Tremors in b/l hands   Psychiatric:         Mood and Affect: Mood is anxious.           Significant Labs: A1C:   Recent Labs   Lab 03/26/23  1656 06/16/23  1817   HGBA1C 5.1 4.7       Blood Culture: No results for input(s): LABBLOO in the last 48 hours.  CBC:   Recent Labs   Lab 06/21/23  0509 06/22/23  0507   WBC 3.53* 3.77*   HGB 10.5* 10.4*   HCT 34.7* 34.7*   PLT 83* 87*       CMP:   Recent Labs   Lab 06/21/23  0509      K 4.1      CO2 24   *   BUN 8   CREATININE 0.8   CALCIUM 8.8   ANIONGAP 8       Coagulation:   No results for input(s): PT, INR, APTT in the last 48 hours.    Lactic Acid: No results for input(s): LACTATE in the last 48 hours.  Magnesium:   Recent Labs   Lab 06/21/23  0509   MG 1.7       POCT Glucose:   Recent Labs   Lab 06/21/23  1148  06/21/23  1702 06/21/23  1958   POCTGLUCOSE 135* 116* 127*       Troponin: No results for input(s): TROPONINI, TROPONINIHS in the last 48 hours.  TSH:   Recent Labs   Lab 06/16/23  0918   TSH 1.283           Significant Imaging: I have reviewed all pertinent imaging results/findings within the past 24 hours.

## 2023-06-22 NOTE — PLAN OF CARE
Pt to dc home today, will provide community resources for o/p alcohol abuse for Pt to follow at [facility].

## 2023-06-22 NOTE — PLAN OF CARE
APPOINTMENT:    Patient Appointment(s) scheduled with Andrew Rodriguez MD, on Thursday Jul 6, 2023 1:20 PM

## 2023-06-22 NOTE — PROGRESS NOTES
[facility] Medicine  Progress Note    Patient Name: Earl Abdul  MRN: [id number]  Patient Class: IP- Inpatient   Admission Date: 6/16/2023  Length of Stay: 4 days  Attending Physician: Andrei Sosa MD  Primary Care Provider: Andrew Rodriguez MD        Subjective:     Principal Problem:Alcohol withdrawal syndrome with complication        HPI:  Ms. Abdul is a 63 year old woman with PMH EtOH use disorder with history of prior seizures, multiple admissions for alcohol withdrawal, depression with suicide attempt in 2017, breast cancer, HTN, HLD, fibromyalgia, sarcoidosis, hypothyroidism, T2DM, CLL (not on treatment) and COPD who presented to [facility] [place] [facility]ED with chief complaint of multiple falls.  Patient drank a lot of alcohol today, was walking around and lost her balance and fell. She reports that he did hit her head and loss consciousness. She endorsed facial pain and notes abrasion below right eye. She denies chest pain, abdominal pain, shortness breath, urinary symptoms, vomiting, numbness or weakness in her arms or legs. She drinks about a bottle of vodka a day (750 ml).    In the ED, she was afebrile, tachycardic to 108, tachypneic with RR 22, and O2 sat at 88% and was placed on 2 L NC. She was alert and oriented x3 and denied hallucinations. CT head showed no concern for intracranial hemorrhage. CT maxillofacial showed previous nasal fracture, no acute fractures. CT C-spine showed no concern for acute fractures. While in the ED, she later endorsed SI and she was PEC'd.     CBC showed WBC 17, CMP showed no electrolyte abnormalities and UA showed no concern for UTI. UDS positive for benzodiazepines. Ethanol level elevated at 215. TSH within normal limits. Patient started developing tremors, concern for alcohol withdrawal and was given a dose of valium 10 mg and subsequently given 1 mg Ativan. Addiction psychiatry consulted and patient was started on CIWA protocol. She was admitted to  Hospital Medicine service for further evaluation and management of alcohol withdrawal.       Overview/Hospital Course:  No notes on file    Interval History: Patient lying in bed, no acute distress. No acute events overnight. Patient at baseline mental status. Reports tremors, fatigue and anxiety. Tolerating valium taper per psychiatry recommendations. Started buspirone for anxiety.     Review of Systems   Constitutional:  Positive for fatigue. Negative for activity change, chills and fever.   HENT:  Negative for congestion and trouble swallowing.    Eyes:  Negative for visual disturbance.   Respiratory:  Negative for cough and shortness of breath.    Cardiovascular:  Negative for chest pain and leg swelling.   Gastrointestinal:  Negative for abdominal distention, abdominal pain, nausea and vomiting.   Endocrine: Negative for cold intolerance, heat intolerance, polydipsia and polyuria.   Genitourinary:  Negative for difficulty urinating and dysuria.   Musculoskeletal:  Negative for back pain and myalgias.   Skin:  Negative for rash and wound.   Neurological:  Positive for tremors and weakness. Negative for dizziness and light-headedness.   Hematological:  Negative for adenopathy. Does not bruise/bleed easily.   Psychiatric/Behavioral:  Positive for decreased concentration. Negative for confusion, sleep disturbance and suicidal ideas. The patient is nervous/anxious.      Objective:     Vital Signs (Most Recent):  Temp: 97.9 °F (36.6 °C) (06/22/23 0454)  Pulse: 70 (06/22/23 0454)  Resp: 18 (06/22/23 0454)  BP: (!) 141/75 (06/22/23 0454)  SpO2: (!) 93 % (06/22/23 0454) Vital Signs (24h Range):  Temp:  [96.8 °F (36 °C)-98 °F (36.7 °C)] 97.9 °F (36.6 °C)  Pulse:  [64-83] 70  Resp:  [14-20] 18  SpO2:  [91 %-97 %] 93 %  BP: (116-201)/(56-83) 141/75     Weight: 73.9 kg (163 lb)  Body mass index is 26.31 kg/m².  No intake or output data in the 24 hours ending 06/22/23 0556        Physical Exam  Constitutional:        Appearance: She is well-developed. She is ill-appearing.   HENT:      Head: Normocephalic and atraumatic.   Eyes:      General: No scleral icterus.     Extraocular Movements: Extraocular movements intact.      Pupils: Pupils are equal, round, and reactive to light.   Neck:      Vascular: No JVD.   Cardiovascular:      Rate and Rhythm: Normal rate and regular rhythm.      Heart sounds: No murmur heard.    No friction rub. No gallop.   Pulmonary:      Effort: Pulmonary effort is normal. No respiratory distress.      Breath sounds: Normal breath sounds. No wheezing.   Abdominal:      General: Bowel sounds are normal. There is no distension.      Palpations: Abdomen is soft.      Tenderness: There is no abdominal tenderness.   Musculoskeletal:         General: No deformity. Normal range of motion.      Cervical back: Neck supple.   Lymphadenopathy:      Cervical: No cervical adenopathy.   Skin:     General: Skin is warm and dry.      Capillary Refill: Capillary refill takes less than 2 seconds.      Findings: No erythema or rash.   Neurological:      General: No focal deficit present.      Mental Status: She is alert and oriented to person, place, and time. Mental status is at baseline.      Cranial Nerves: No cranial nerve deficit.      Sensory: No sensory deficit.      Comments: Tremors in b/l hands   Psychiatric:         Mood and Affect: Mood is anxious.           Significant Labs: A1C:   Recent Labs   Lab 03/26/23  1656 06/16/23  1817   HGBA1C 5.1 4.7       Blood Culture: No results for input(s): LABBLOO in the last 48 hours.  CBC:   Recent Labs   Lab 06/21/23  0509 06/22/23  0507   WBC 3.53* 3.77*   HGB 10.5* 10.4*   HCT 34.7* 34.7*   PLT 83* 87*       CMP:   Recent Labs   Lab 06/21/23  0509      K 4.1      CO2 24   *   BUN 8   CREATININE 0.8   CALCIUM 8.8   ANIONGAP 8       Coagulation:   No results for input(s): PT, INR, APTT in the last 48 hours.    Lactic Acid: No results for input(s):  LACTATE in the last 48 hours.  Magnesium:   Recent Labs   Lab 06/21/23  0509   MG 1.7       POCT Glucose:   Recent Labs   Lab 06/21/23  1148 06/21/23  1702 06/21/23  1958   POCTGLUCOSE 135* 116* 127*       Troponin: No results for input(s): TROPONINI, TROPONINIHS in the last 48 hours.  TSH:   Recent Labs   Lab 06/16/23  0918   TSH 1.283           Significant Imaging: I have reviewed all pertinent imaging results/findings within the past 24 hours.      Assessment/Plan:      * Alcohol withdrawal syndrome with complication  - She drinks about a bottle of vodka a day (750 ml).  - last drink day of admission on 6/16  - alcohol level on admit, 215  - followup PETH  - on CIWA protocol. CIWA score no higher than 11 on 6/19  - continue valium 10 mg q8h and prn ativan  - continue folate, thiamine, MVI  - replete electrolytes prn  - addiction psychiatry consulted. apprec recs  -- valium taper provided by psych as follows:  ? 6/20: Valium 10mg qAM/ 10mg @1200 /10mg qPM  6/21: Valium 10mg qAM/ 5mg @1200 /10mg qPM  6/22: Valium 10mg qAM/ 5mg @ 1200 /5mg qPM  6/23: Valium 5mg qAM/ 5mg @ 1200 /5mg qPM  6/24: Valium 5mg BID  6/25: Valium 5mg qHS      GERD (gastroesophageal reflux disease)  - continue home protonix      Hypernatremia  - likely 2/2 hypovolemic hypernatremia  - completed NS infusion   - RESOLVED       Acute hypoxemic respiratory failure  Patient with Hypoxic Respiratory failure which is Acute.  she is not on home oxygen. Supplemental oxygen was provided and noted- Oxygen Concentration (%):  [28] 28    .   Signs/symptoms of respiratory failure include- tachypnea and lethargy. Contributing diagnoses includes - Aspiration, COPD and Pneumonia Labs and images were reviewed. Patient Has not had a recent ABG. Will treat underlying causes and adjust management of respiratory failure:    PLAN:  - not on oxygen at home   - O2 sat 88% on room air. On 2 L NC  - CXR nonacute   - followup TTE  - likely 2/2 tachypnea due to alcoholic  ketosis   - wean O2 as tolerated     Migraine        CLL (chronic lymphocytic leukemia)  - Established with Dr. Gonzalez. Not currently on treatment.   - Chronic thrombocytopenia noted.  Possibly associated with alcohol use.   - Outpatient Heme-Onc follow up         Lymphocytosis        Type 2 diabetes mellitus with hyperglycemia  Patient's FSGs are controlled on current medication regimen.  Last A1c reviewed-   Lab Results   Component Value Date    HGBA1C 4.7 06/16/2023     Most recent fingerstick glucose reviewed-   Recent Labs   Lab 06/16/23  1228 06/16/23  2041   POCTGLUCOSE 77 75     Current correctional scale  Low  Maintain anti-hyperglycemic dose as follows-   Antihyperglycemics (From admission, onward)    Start     Stop Route Frequency Ordered    06/16/23 1658  insulin aspart U-100 pen 0-5 Units         -- SubQ Before meals & nightly PRN 06/16/23 1702        Hold Oral hypoglycemics while patient is in the hospital.  - Holding home metformin 1000mg BID      Anemia  - Hb on admit, 11  - baseline Hb 9-10  - likely hemoconcentration   - transfuse for Hb < 7  - CBC daily         COPD (chronic obstructive pulmonary disease)  - not on oxygen at home   - continue home Breo, combivent  - prn albuterol   - CXR negative   - wean O2 as tolerated   - O2 goal 8-92%         Malignant neoplasm of central portion of right breast in female, estrogen receptor positive  - T1b N0 stage IA breast cancer diagnosed October 2020.   - S/p bilateral mastectomy with no adjuvant therapy; received Letrozole February 2021-may 2021         VINICIO (obstructive sleep apnea)  - use CPAP nightly when AMS resolves       Suicidal ideation  - endorsed suicidal ideations on day of admission   - h/o SI, depression, anxiety and alcohol abuse   - PEC started on 6/16  - psychiatry consulted. apprec recs  -- PEC rescinded on 6/18      Hyperlipidemia  - not currently on a statin           History of sarcoidosis  - Patient with documented history of  "sarcoidosis.  Not currently on treatment.         Depression with anxiety  Patient has persistent depression which is moderate and is currently uncontrolled. Will Continue anti-depressant medications. We will consult psychiatry at this time. Patient does not display psychosis at this time. Continue to monitor closely and adjust plan of care as needed.    - continue home cymbalta and trazodone  - psychiatry consulted. apprec recs         History of substance abuse        Sarcoidosis  - Patient with documented history of sarcoidosis.  Not currently on treatment.      Fibromyalgia  - continue home cymbalta      Essential hypertension  - continue home lisiniopril    Alcohol use disorder, severe, dependence  - see "alcohol withdrawal"        VTE Risk Mitigation (From admission, onward)         Ordered     heparin (porcine) injection 5,000 Units  Every 8 hours         06/16/23 1702     IP VTE HIGH RISK PATIENT  Once         06/16/23 1702     Place sequential compression device  Until discontinued         06/16/23 1702                Discharge Planning   BECCA: 6/25/2023     Code Status: Full Code   Is the patient medically ready for discharge?: No    Reason for patient still in hospital (select all that apply): Patient unstable, Patient trending condition, Laboratory test, Treatment and Consult recommendations  Discharge Plan A: Home with family   Discharge Delays: None known at this time        Time spent in care of patient: > 35 minutes         Andrei Sosa MD  Department of Hospital Medicine   [facility] - Med Surg    "

## 2023-06-22 NOTE — PLAN OF CARE
[facility] - Med S[facility]  Discharge Final Note    Primary Care Provider: Andrew Rodriguez MD    Expected Discharge Date: 6/22/2023    Final Discharge Note (most recent)       Final Note - 06/22/23 1242          Final Note    Assessment Type Final Discharge Note     Anticipated Discharge Disposition Home or Self Care     Hospital Resources/Appts/Education Provided Appointments scheduled and added to AVS;Community resources provided        Post-Acute Status    Other Status Community Services     Discharge Delays None known at this time                     Important Message from Medicare

## 2023-06-22 NOTE — PLAN OF CARE
Problem: Violence Risk or Actual  Goal: Anger and Impulse Control  Outcome: Met     Problem: Adult Inpatient Plan of Care  Goal: Plan of Care Review  Outcome: Met  Goal: Patient-Specific Goal (Individualized)  Outcome: Met  Goal: Absence of Hospital-Acquired Illness or Injury  Outcome: Met  Goal: Optimal Comfort and Wellbeing  Outcome: Met  Goal: Readiness for Transition of Care  Outcome: Met     Problem: Diabetes Comorbidity  Goal: Blood Glucose Level Within Targeted Range  Outcome: Met     Problem: Fluid and Electrolyte Imbalance (Acute Kidney Injury/Impairment)  Goal: Fluid and Electrolyte Balance  Outcome: Met     Problem: Oral Intake Inadequate (Acute Kidney Injury/Impairment)  Goal: Optimal Nutrition Intake  Outcome: Met     Problem: Renal Function Impairment (Acute Kidney Injury/Impairment)  Goal: Effective Renal Function  Outcome: Met     Problem: Skin Injury Risk Increased  Goal: Skin Health and Integrity  Outcome: Met     Problem: Fall Injury Risk  Goal: Absence of Fall and Fall-Related Injury  Outcome: Met  D/C instructions reviewed with patient and , questions answered and copy of instructions given to .  Both verbalized understanding of instructions.  took patient off unit in w/c.

## 2023-06-23 ENCOUNTER — PATIENT MESSAGE (OUTPATIENT)
Dept: ADMINISTRATIVE | Facility: CLINIC | Age: 63
End: 2023-06-23
Payer: COMMERCIAL

## 2023-06-23 ENCOUNTER — PATIENT OUTREACH (OUTPATIENT)
Dept: ADMINISTRATIVE | Facility: CLINIC | Age: 63
End: 2023-06-23
Payer: COMMERCIAL

## 2023-06-23 NOTE — PROGRESS NOTES
C3 nurse attempted to contact Earl Abdul  for a TCC post hospital discharge follow up call. No answer. Left voicemail with callback information. The patient does not have a scheduled HOSFU appointment. Message sent to PCP staff for assistance with scheduling visit with patient

## 2023-06-23 NOTE — HOSPITAL COURSE
Patient was admitted with alcohol intoxication, Impending alcohol withdrawal.  She was started on scheduled oral Valium taper and prn ativan iv, with the guidance of Psychiatry.  She finished 6 days of her valium taper doses.  She is currently doing much better, not exhibiting alcohol withdrawal symptoms.  She has chronic bilateral hand tremors, and was diagnosed with essential tremors by neurologist outpatient per patient.     Case was discussed with psychiatry today.  According to Psychiatry, the rest of Valium taper can be done outpatient.  I spoke to the patient about possible inpatient rehabilitation program given her long history of alcohol abuse, but she refused, stating that she had done that before for 2 months, and she requested to go home with outpatient intense therapy.   was contacted to provide patient with outpt resources.  According to the patient she is already set up for an appointment in a week with outpatient substance abuse program.  Patient refused naltrexone on discharge.  She was referred to Psychiatry on discharge for her persistent anxiety.    Discharge diagnosis:  * Alcohol withdrawal syndrome with complication  Alcohol use disorder, severe, dependence  GERD (gastroesophageal reflux disease)  CLL (chronic lymphocytic leukemia)   Type 2 diabetes mellitus    COPD (chronic obstructive pulmonary disease)   VINICIO (obstructive sleep apnea)  Hyperlipidemia  Depression with anxiety  Fibromyalgia  Essential hypertension      Discharge physical exam;  Vitals; reviewed  General; no acute distress  HENT; NC/AT  CV; RRR  Resp; CTA bilateral lungs  Abdomen; soft, NT, ND, +ve bowel sounds.   Neuro; AAO*4, no neuro deficits.   Psych; no suicidal or homicidal ideation

## 2023-06-23 NOTE — DISCHARGE SUMMARY
[facility]  Discharge Summary      Patient Name: Earl Abdul  MRN: [id number]  IZA: [id number]  Patient Class: IP- Inpatient  Admission Date: 6/16/2023  Hospital Length of Stay: 4 days  Discharge Date and Time:  06/22/2023 9:53 PM  Attending Physician: No att. providers found   Discharging Provider: Iris Soriano MD  Primary Care Provider: Andrew Rodriguez MD  Hospital Medicine Team: [facility] Iris Soriano MD  Primary Care Team: [facility]    HPI:   Ms. Abdul is a 63 year old woman with PMH EtOH use disorder with history of prior seizures, multiple admissions for alcohol withdrawal, depression with suicide attempt in 2017, breast cancer, HTN, HLD, fibromyalgia, sarcoidosis, hypothyroidism, T2DM, CLL (not on treatment) and COPD who presented to [facility]ED with chief complaint of multiple falls.  Patient drank a lot of alcohol today, was walking around and lost her balance and fell. She reports that he did hit her head and loss consciousness. She endorsed facial pain and notes abrasion below right eye. She denies chest pain, abdominal pain, shortness breath, urinary symptoms, vomiting, numbness or weakness in her arms or legs. She drinks about a bottle of vodka a day (750 ml).    In the ED, she was afebrile, tachycardic to 108, tachypneic with RR 22, and O2 sat at 88% and was placed on 2 L NC. She was alert and oriented x3 and denied hallucinations. CT head showed no concern for intracranial hemorrhage. CT maxillofacial showed previous nasal fracture, no acute fractures. CT C-spine showed no concern for acute fractures. While in the ED, she later endorsed SI and she was PEC'd.     CBC showed WBC 17, CMP showed no electrolyte abnormalities and UA showed no concern for UTI. UDS positive for benzodiazepines. Ethanol level elevated at 215. TSH within normal limits. Patient started developing tremors, concern for alcohol withdrawal and was given a dose of valium 10 mg and  subsequently given 1 mg Ativan. Addiction psychiatry consulted and patient was started on CIWA protocol. She was admitted to Hospital Medicine service for further evaluation and management of alcohol withdrawal.       * No surgery found *      Hospital Course:   Patient was admitted with alcohol intoxication, Impending alcohol withdrawal.  She was started on scheduled oral Valium taper and prn ativan iv, with the guidance of Psychiatry.  She finished 6 days of her valium taper doses.  She is currently doing much better, not exhibiting alcohol withdrawal symptoms.  She has chronic bilateral hand tremors, and was diagnosed with essential tremors by neurologist outpatient per patient.     Case was discussed with psychiatry today.  According to Psychiatry, the rest of Valium taper can be done outpatient.  I spoke to the patient about possible inpatient rehabilitation program given her long history of alcohol abuse, but she refused, stating that she had done that before for 2 months, and she requested to go home with outpatient intense therapy.   was contacted to provide patient with outpt resources.  According to the patient she is already set up for an appointment in a week with outpatient substance abuse program.  Patient refused naltrexone on discharge.  She was referred to Psychiatry on discharge for her persistent anxiety.    Discharge diagnosis:  * Alcohol withdrawal syndrome with complication  Alcohol use disorder, severe, dependence  GERD (gastroesophageal reflux disease)  CLL (chronic lymphocytic leukemia)   Type 2 diabetes mellitus    COPD (chronic obstructive pulmonary disease)   VINICIO (obstructive sleep apnea)  Hyperlipidemia  Depression with anxiety  Fibromyalgia  Essential hypertension      Discharge physical exam;  Vitals; reviewed  General; no acute distress  HENT; NC/AT  CV; RRR  Resp; CTA bilateral lungs  Abdomen; soft, NT, ND, +ve bowel sounds.   Neuro; AAO*4, no neuro deficits.   Psych; no  suicidal or homicidal ideation           Goals of Care Treatment Preferences:  Code Status: Full Code    Health care agent: Spouse is NOK  Health care agent number: No value filed.          What is most important right now is to focus on curative/life-prolongation (regardless of treatment burdens).  Accordingly, we have decided that the best plan to meet the patient's goals includes continuing with treatment.      Consults:   Consults (From admission, onward)        Status Ordering Provider     Inpatient consult to Midline team  Once        Provider:  (Not yet assigned)    Completed AKSHAT CABALLERO     Inpatient consult to Psychiatry  Once        Provider:  (Not yet assigned)    Completed AKSHAT CABALLERO          No new Assessment & Plan notes have been filed under this hospital service since the last note was generated.  Service: Hospital Medicine    Final Active Diagnoses:    Diagnosis Date Noted POA    Hypernatremia [E87.0] 06/16/2023 Yes    GERD (gastroesophageal reflux disease) [K21.9]  Yes    CLL (chronic lymphocytic leukemia) [C91.10] 09/30/2022 Yes    Anemia [D64.9] 11/30/2021 Yes    Type 2 diabetes mellitus with hyperglycemia [E11.65] 11/30/2021 Yes    COPD (chronic obstructive pulmonary disease) [J44.9] 04/17/2021 Yes    Malignant neoplasm of central portion of right breast in female, estrogen receptor positive [C50.111, Z17.0] 11/18/2020 Not Applicable    VINICIO (obstructive sleep apnea) [G47.33]  Yes    Depression with anxiety [F41.8] 08/16/2017 Yes    History of sarcoidosis [Z86.2] 07/26/2017 Yes     Chronic    Alcohol use disorder, severe, dependence [F10.20] 02/07/2014 Yes     Chronic    Essential hypertension [I10] 02/07/2014 Yes    Fibromyalgia [M79.7] 02/07/2014 Yes    Hyperlipidemia [E78.5] 11/30/2012 Yes    Sarcoidosis [D86.9] 03/31/2011 Yes      Problems Resolved During this Admission:    Diagnosis Date Noted Date Resolved POA    PRINCIPAL PROBLEM:  Alcohol withdrawal syndrome  with complication [F10.939] 02/07/2014 06/22/2023 Yes    Acute hypoxemic respiratory failure [J96.01] 06/16/2023 06/22/2023 Yes    Suicidal ideation [R45.851] 10/26/2019 06/22/2023 Not Applicable       Discharged Condition: stable    Disposition: Home or Self Care    Follow Up:   Follow-up Information     Andrew Rodriguez MD .    Specialty: Internal Medicine  Contact information:  [address] [place], [place] [address]DASHA MERLIN  [address]  [facility] [address]  [phone]                       Patient Instructions:      Ambulatory referral/consult to Psychiatry   Standing Status: Future   Referral Priority: Routine Referral Type: Psychiatric   Referral Reason: Specialty Services Required   Requested Specialty: Psychiatry   Number of Visits Requested: 1       Significant Diagnostic Studies: Labs:   BMP:   Recent Labs   Lab 06/21/23  0509 06/22/23  0506   * 87    142   K 4.1 4.1    110   CO2 24 23   BUN 8 12   CREATININE 0.8 0.7   CALCIUM 8.8 8.9   MG 1.7 1.8   , CMP   Recent Labs   Lab 06/21/23  0509 06/22/23  0506    142   K 4.1 4.1    110   CO2 24 23   * 87   BUN 8 12   CREATININE 0.8 0.7   CALCIUM 8.8 8.9   ANIONGAP 8 9    and CBC   Recent Labs   Lab 06/21/23  0509 06/22/23  0507   WBC 3.53* 3.77*   HGB 10.5* 10.4*   HCT 34.7* 34.7*   PLT 83* 87*       Pending Diagnostic Studies:     Procedure Component Value Units Date/Time    Acetaminophen level [[id number]] Collected: 06/16/23 0918    Order Status: Sent Lab Status: In process Updated: 06/16/23 0918    Specimen: Blood     Ethanol [[id number]] Collected: 06/16/23 0918    Order Status: Sent Lab Status: In process Updated: 06/16/23 0918    Specimen: Blood          Medications:  Reconciled Home Medications:      Medication List      START taking these medications    diazePAM 5 MG tablet  Commonly known as: VALIUM  Take one tablet tonight, followed by 1 tablet three times a day tomorrow, then 1 tablet twice a day on 6/24 then 1 tablet on 6/25 night then  stop.        CHANGE how you take these medications    DULoxetine 60 MG capsule  Commonly known as: CYMBALTA  Take 60 mg by mouth.  What changed: Another medication with the same name was removed. Continue taking this medication, and follow the directions you see here.     ondansetron 4 MG Tbdl  Commonly known as: ZOFRAN-ODT  Take 1 tablet (4 mg total) by mouth every 6 (six) hours as needed (nausea/vomiting).  What changed: reasons to take this        CONTINUE taking these medications    acetaminophen 500 MG tablet  Commonly known as: TYLENOL  Take 500-1,500 mg by mouth daily as needed for Pain.     albuterol 90 mcg/actuation inhaler  Commonly known as: PROVENTIL/VENTOLIN HFA     BREO ELLIPTA 100-25 mcg/dose diskus inhaler  Generic drug: fluticasone furoate-vilanteroL  Inhale 1 puff into the lungs once daily. Controller     dicyclomine 20 mg tablet  Commonly known as: BENTYL  Take 20 mg by mouth 4 (four) times daily.     folic acid 1 MG tablet  Commonly known as: FOLVITE  Take 1 tablet (1 mg total) by mouth once daily.     gabapentin 600 MG tablet  Commonly known as: NEURONTIN  Take 1 tablet (600 mg total) by mouth 3 (three) times daily. for 10 days     levothyroxine 50 MCG tablet  Commonly known as: SYNTHROID  Take 1 tablet (50 mcg total) by mouth before breakfast.     lisinopriL 20 MG tablet  Commonly known as: PRINIVIL,ZESTRIL  Take 1 tablet (20 mg total) by mouth once daily.     loperamide 2 mg capsule  Commonly known as: IMODIUM  Take 2 mg by mouth 4 (four) times daily as needed for Diarrhea (Per package directions).     melatonin  Commonly known as: MELATIN  Take by mouth nightly as needed for Insomnia.     metFORMIN 500 MG ER 24hr tablet  Commonly known as: GLUCOPHAGE-XR  Take 2 tablets (1,000 mg total) by mouth 2 (two) times daily with meals.     multivitamin Tab  Take 1 tablet by mouth once daily.     pantoprazole 40 MG tablet  Commonly known as: PROTONIX  Take 40 mg by mouth once daily.     thiamine 100 MG  tablet  Take 1 tablet (100 mg total) by mouth once daily.     traZODone 300 MG tablet  Commonly known as: DESYREL  TAKE 1 TABLET BY MOUTH AT BEDTIME        STOP taking these medications    chlordiazepoxide 25 MG Cap  Commonly known as: LIBRIUM     naltrexone 50 mg tablet  Commonly known as: DEPADE            Indwelling Lines/Drains at time of discharge:   Lines/Drains/Airways     None                 Time spent on the discharge of patient: 40 minutes         Iris Soriano MD  Department of Hospital Medicine  [facility]

## 2023-06-26 NOTE — PROGRESS NOTES
C3 nurse spoke with Earl Abdul  for a TCC post hospital discharge follow up call. The patient has a scheduled HOSFU appointment with Andrew Rodriguez MD on 07/09/2023 @ 1120.

## 2023-06-27 ENCOUNTER — OFFICE VISIT (OUTPATIENT)
Dept: INTERNAL MEDICINE | Facility: CLINIC | Age: 63
End: 2023-06-27
Attending: INTERNAL MEDICINE
Payer: COMMERCIAL

## 2023-06-27 ENCOUNTER — TELEPHONE (OUTPATIENT)
Dept: INTERNAL MEDICINE | Facility: CLINIC | Age: 63
End: 2023-06-27

## 2023-06-27 VITALS
WEIGHT: 167.31 LBS | HEART RATE: 65 BPM | OXYGEN SATURATION: 95 % | BODY MASS INDEX: 26.89 KG/M2 | SYSTOLIC BLOOD PRESSURE: 124 MMHG | DIASTOLIC BLOOD PRESSURE: 78 MMHG | HEIGHT: 66 IN

## 2023-06-27 DIAGNOSIS — F10.20 ALCOHOL USE DISORDER, SEVERE, DEPENDENCE: Primary | ICD-10-CM

## 2023-06-27 DIAGNOSIS — F10.230 ALCOHOL DEPENDENCE WITH UNCOMPLICATED WITHDRAWAL: ICD-10-CM

## 2023-06-27 PROCEDURE — 1111F PR DISCHARGE MEDS RECONCILED W/ CURRENT OUTPATIENT MED LIST: ICD-10-PCS | Mod: CPTII,S$GLB,, | Performed by: INTERNAL MEDICINE

## 2023-06-27 PROCEDURE — 3288F PR FALLS RISK ASSESSMENT DOCUMENTED: ICD-10-PCS | Mod: CPTII,S$GLB,, | Performed by: INTERNAL MEDICINE

## 2023-06-27 PROCEDURE — 3044F HG A1C LEVEL LT 7.0%: CPT | Mod: CPTII,S$GLB,, | Performed by: INTERNAL MEDICINE

## 2023-06-27 PROCEDURE — 1111F DSCHRG MED/CURRENT MED MERGE: CPT | Mod: CPTII,S$GLB,, | Performed by: INTERNAL MEDICINE

## 2023-06-27 PROCEDURE — 3044F PR MOST RECENT HEMOGLOBIN A1C LEVEL <7.0%: ICD-10-PCS | Mod: CPTII,S$GLB,, | Performed by: INTERNAL MEDICINE

## 2023-06-27 PROCEDURE — 3074F PR MOST RECENT SYSTOLIC BLOOD PRESSURE < 130 MM HG: ICD-10-PCS | Mod: CPTII,S$GLB,, | Performed by: INTERNAL MEDICINE

## 2023-06-27 PROCEDURE — 4010F PR ACE/ARB THEARPY RXD/TAKEN: ICD-10-PCS | Mod: CPTII,S$GLB,, | Performed by: INTERNAL MEDICINE

## 2023-06-27 PROCEDURE — 3074F SYST BP LT 130 MM HG: CPT | Mod: CPTII,S$GLB,, | Performed by: INTERNAL MEDICINE

## 2023-06-27 PROCEDURE — 4010F ACE/ARB THERAPY RXD/TAKEN: CPT | Mod: CPTII,S$GLB,, | Performed by: INTERNAL MEDICINE

## 2023-06-27 PROCEDURE — 1160F PR REVIEW ALL MEDS BY PRESCRIBER/CLIN PHARMACIST DOCUMENTED: ICD-10-PCS | Mod: CPTII,S$GLB,, | Performed by: INTERNAL MEDICINE

## 2023-06-27 PROCEDURE — 3072F LOW RISK FOR RETINOPATHY: CPT | Mod: CPTII,S$GLB,, | Performed by: INTERNAL MEDICINE

## 2023-06-27 PROCEDURE — 3288F FALL RISK ASSESSMENT DOCD: CPT | Mod: CPTII,S$GLB,, | Performed by: INTERNAL MEDICINE

## 2023-06-27 PROCEDURE — 1160F RVW MEDS BY RX/DR IN RCRD: CPT | Mod: CPTII,S$GLB,, | Performed by: INTERNAL MEDICINE

## 2023-06-27 PROCEDURE — 3008F PR BODY MASS INDEX (BMI) DOCUMENTED: ICD-10-PCS | Mod: CPTII,S$GLB,, | Performed by: INTERNAL MEDICINE

## 2023-06-27 PROCEDURE — 99999 PR PBB SHADOW E&M-EST. PATIENT-LVL V: CPT | Mod: PBBFAC,,, | Performed by: INTERNAL MEDICINE

## 2023-06-27 PROCEDURE — 99214 OFFICE O/P EST MOD 30 MIN: CPT | Mod: S$GLB,,, | Performed by: INTERNAL MEDICINE

## 2023-06-27 PROCEDURE — 99214 PR OFFICE/OUTPT VISIT, EST, LEVL IV, 30-39 MIN: ICD-10-PCS | Mod: S$GLB,,, | Performed by: INTERNAL MEDICINE

## 2023-06-27 PROCEDURE — 99999 PR PBB SHADOW E&M-EST. PATIENT-LVL V: ICD-10-PCS | Mod: PBBFAC,,, | Performed by: INTERNAL MEDICINE

## 2023-06-27 PROCEDURE — 1101F PR PT FALLS ASSESS DOC 0-1 FALLS W/OUT INJ PAST YR: ICD-10-PCS | Mod: CPTII,S$GLB,, | Performed by: INTERNAL MEDICINE

## 2023-06-27 PROCEDURE — 3008F BODY MASS INDEX DOCD: CPT | Mod: CPTII,S$GLB,, | Performed by: INTERNAL MEDICINE

## 2023-06-27 PROCEDURE — 3078F PR MOST RECENT DIASTOLIC BLOOD PRESSURE < 80 MM HG: ICD-10-PCS | Mod: CPTII,S$GLB,, | Performed by: INTERNAL MEDICINE

## 2023-06-27 PROCEDURE — 1101F PT FALLS ASSESS-DOCD LE1/YR: CPT | Mod: CPTII,S$GLB,, | Performed by: INTERNAL MEDICINE

## 2023-06-27 PROCEDURE — 1159F MED LIST DOCD IN RCRD: CPT | Mod: CPTII,S$GLB,, | Performed by: INTERNAL MEDICINE

## 2023-06-27 PROCEDURE — 1159F PR MEDICATION LIST DOCUMENTED IN MEDICAL RECORD: ICD-10-PCS | Mod: CPTII,S$GLB,, | Performed by: INTERNAL MEDICINE

## 2023-06-27 PROCEDURE — 3078F DIAST BP <80 MM HG: CPT | Mod: CPTII,S$GLB,, | Performed by: INTERNAL MEDICINE

## 2023-06-27 PROCEDURE — 3072F PR LOW RISK FOR RETINOPATHY: ICD-10-PCS | Mod: CPTII,S$GLB,, | Performed by: INTERNAL MEDICINE

## 2023-06-27 RX ORDER — CHLORDIAZEPOXIDE HYDROCHLORIDE 25 MG/1
CAPSULE, GELATIN COATED ORAL
Qty: 30 CAPSULE | Refills: 0 | Status: ON HOLD | OUTPATIENT
Start: 2023-06-27 | End: 2023-07-21 | Stop reason: HOSPADM

## 2023-06-27 NOTE — TELEPHONE ENCOUNTER
----- Message from Asad Desai sent at 6/26/2023  2:43 PM CDT -----  Name of Who is Calling:MARTY SALDAÑA [[id number]]           What is the request in detail:pt stated that he would like to speak with the office directly in regards to his questions/concerns.Please contact to further discuss and advise.           Can the clinic reply by [facility]:[phone]           What Number to Call Back if not [facility]:[phone]

## 2023-06-27 NOTE — TELEPHONE ENCOUNTER
----- Message from Selena Reyna sent at 6/27/2023  8:28 AM CDT -----  Regarding: pt appt  Name of Who is Calling:MARTY SALDAÑA [[id number]]          What is the request in detail:Pt states that her appt is on July6. She said that she talked to someone in the office and they made her appt today, as she will be out of town on 7/6. I do not see an appt for her. Please call and let her know if she can come in today.           Can the clinic reply by [facility]:          What Number to Call Back if not in [facility]:[phone]

## 2023-06-27 NOTE — PROGRESS NOTES
"Subjective:       Patient ID: Earl Abdul is a 63 y.o. female.    Chief Complaint: Hospital Follow Up    Here for hospital f/u    Admitted again for Etoh withdrawal. She completed valium taper from discharge and today is not feeling well and feel jittery and anxious. Reports starting intense outpatient therapy Monday. Reports traveling to [place] on Saturday. Maybe the week prior. She is fearful of going back into s. Reports she has been searching for psychiatrist to no avail.      Review of Systems    Objective:      Vitals:    06/27/23 1121   BP: 124/78   Pulse: 65   SpO2: 95%   Weight: 75.9 kg (167 lb 5.3 oz)   Height: 5' 6" (1.676 m)      Physical Exam  Neurological:      Motor: Tremor present.   Psychiatric:         Attention and Perception: She does not perceive auditory or visual hallucinations.         Mood and Affect: Mood is anxious and depressed. Mood is not elated. Affect is not tearful.         Speech: Speech is not rapid and pressured or slurred.         Behavior: Behavior is not agitated, slowed, aggressive, withdrawn, hyperactive or combative.         Cognition and Memory: Memory is impaired. She exhibits impaired recent memory.         Judgment: Judgment is not inappropriate.       Assessment:       1. Alcohol use disorder, severe, dependence    2. Alcohol dependence with uncomplicated withdrawal        Plan:       Earl was seen today for hospital follow up.    Diagnoses and all orders for this visit:    Alcohol use disorder, severe, dependence  Expressed to pt that I do her a disservice by simply Rx her bridges of medications when she does not establish with psychiatrist. Pt requesting chronic sleep aid in addition to help with withdrawal symptoms.   -     chlordiazepoxide (LIBRIUM) 25 MG Cap; 1 PO TID for 5 days then 1 PO BID for 5 days then 1 PO QD for 5 days    Alcohol dependence with uncomplicated withdrawal  -     chlordiazepoxide (LIBRIUM) 25 MG Cap; 1 PO TID for 5 days then 1 PO " BID for 5 days then 1 PO QD for 5 days           Andrew Chase MD  Internal Medicine-[facility]        Side effects of medication(s) were discussed in detail and patient voiced understanding.  Patient will call back for any issues or complications.

## 2023-07-03 PROBLEM — J96.02 ACUTE HYPERCAPNIC RESPIRATORY FAILURE: Status: RESOLVED | Noted: 2023-04-01 | Resolved: 2023-07-03

## 2023-07-17 ENCOUNTER — HOSPITAL ENCOUNTER (INPATIENT)
Facility: HOSPITAL | Age: 63
LOS: 4 days | Discharge: HOME | DRG: 897 | End: 2023-07-21
Attending: STUDENT IN AN ORGANIZED HEALTH CARE EDUCATION/TRAINING PROGRAM | Admitting: FAMILY MEDICINE
Payer: COMMERCIAL

## 2023-07-17 DIAGNOSIS — F10.930 ALCOHOL WITHDRAWAL SYNDROME WITHOUT COMPLICATION: Primary | ICD-10-CM

## 2023-07-17 DIAGNOSIS — R00.0 TACHYCARDIA: ICD-10-CM

## 2023-07-17 DIAGNOSIS — D72.829 LEUKOCYTOSIS, UNSPECIFIED TYPE: ICD-10-CM

## 2023-07-17 DIAGNOSIS — R07.9 CHEST PAIN: ICD-10-CM

## 2023-07-17 DIAGNOSIS — F10.939 ALCOHOL WITHDRAWAL SYNDROME WITH COMPLICATION: ICD-10-CM

## 2023-07-17 DIAGNOSIS — F10.20 ALCOHOL USE DISORDER, SEVERE, DEPENDENCE: ICD-10-CM

## 2023-07-17 LAB
ALBUMIN SERPL BCP-MCNC: 4.1 G/DL (ref 3.5–5.2)
ALP SERPL-CCNC: 70 U/L (ref 55–135)
ALT SERPL W/O P-5'-P-CCNC: 24 U/L (ref 10–44)
ANION GAP SERPL CALC-SCNC: 19 MMOL/L (ref 8–16)
ANISOCYTOSIS BLD QL SMEAR: SLIGHT
AST SERPL-CCNC: 33 U/L (ref 10–40)
BASOPHILS NFR BLD: 0 % (ref 0–1.9)
BILIRUB SERPL-MCNC: 0.5 MG/DL (ref 0.1–1)
BNP SERPL-MCNC: 17 PG/ML (ref 0–99)
BUN SERPL-MCNC: 12 MG/DL (ref 8–23)
CALCIUM SERPL-MCNC: 9.1 MG/DL (ref 8.7–10.5)
CHLORIDE SERPL-SCNC: 104 MMOL/L (ref 95–110)
CO2 SERPL-SCNC: 20 MMOL/L (ref 23–29)
CREAT SERPL-MCNC: 0.8 MG/DL (ref 0.5–1.4)
DIFFERENTIAL METHOD: ABNORMAL
EOSINOPHIL NFR BLD: 0 % (ref 0–8)
ERYTHROCYTE [DISTWIDTH] IN BLOOD BY AUTOMATED COUNT: 16.3 % (ref 11.5–14.5)
EST. GFR  (NO RACE VARIABLE): >60 ML/MIN/1.73 M^2
ETHANOL SERPL-MCNC: 231 MG/DL
GLUCOSE SERPL-MCNC: 135 MG/DL (ref 70–110)
HCT VFR BLD AUTO: 38.3 % (ref 37–48.5)
HGB BLD-MCNC: 12.1 G/DL (ref 12–16)
HYPOCHROMIA BLD QL SMEAR: ABNORMAL
IMM GRANULOCYTES # BLD AUTO: ABNORMAL K/UL (ref 0–0.04)
IMM GRANULOCYTES NFR BLD AUTO: ABNORMAL % (ref 0–0.5)
LIPASE SERPL-CCNC: 20 U/L (ref 4–60)
LIPASE SERPL-CCNC: 21 U/L (ref 4–60)
LYMPHOCYTES NFR BLD: 85 % (ref 18–48)
MAGNESIUM SERPL-MCNC: 1.7 MG/DL (ref 1.6–2.6)
MCH RBC QN AUTO: 27.9 PG (ref 27–31)
MCHC RBC AUTO-ENTMCNC: 31.6 G/DL (ref 32–36)
MCV RBC AUTO: 89 FL (ref 82–98)
MONOCYTES NFR BLD: 1 % (ref 4–15)
NEUTROPHILS NFR BLD: 14 % (ref 38–73)
NRBC BLD-RTO: 0 /100 WBC
PLATELET # BLD AUTO: 182 K/UL (ref 150–450)
PLATELET BLD QL SMEAR: ABNORMAL
PMV BLD AUTO: 10.8 FL (ref 9.2–12.9)
POIKILOCYTOSIS BLD QL SMEAR: SLIGHT
POLYCHROMASIA BLD QL SMEAR: ABNORMAL
POTASSIUM SERPL-SCNC: 3.3 MMOL/L (ref 3.5–5.1)
PROT SERPL-MCNC: 7.5 G/DL (ref 6–8.4)
RBC # BLD AUTO: 4.33 M/UL (ref 4–5.4)
SODIUM SERPL-SCNC: 143 MMOL/L (ref 136–145)
SPHEROCYTES BLD QL SMEAR: ABNORMAL
STOMATOCYTES BLD QL SMEAR: PRESENT
TROPONIN I SERPL DL<=0.01 NG/ML-MCNC: 0.01 NG/ML (ref 0–0.03)
WBC # BLD AUTO: 38.02 K/UL (ref 3.9–12.7)

## 2023-07-17 PROCEDURE — 25000003 PHARM REV CODE 250

## 2023-07-17 PROCEDURE — 63600175 PHARM REV CODE 636 W HCPCS

## 2023-07-17 PROCEDURE — 93010 EKG 12-LEAD: ICD-10-PCS | Mod: ,,, | Performed by: INTERNAL MEDICINE

## 2023-07-17 PROCEDURE — 99285 EMERGENCY DEPT VISIT HI MDM: CPT | Mod: 25

## 2023-07-17 PROCEDURE — 63600175 PHARM REV CODE 636 W HCPCS: Performed by: FAMILY MEDICINE

## 2023-07-17 PROCEDURE — 83690 ASSAY OF LIPASE: CPT

## 2023-07-17 PROCEDURE — 83880 ASSAY OF NATRIURETIC PEPTIDE: CPT

## 2023-07-17 PROCEDURE — 63600175 PHARM REV CODE 636 W HCPCS: Performed by: NURSE PRACTITIONER

## 2023-07-17 PROCEDURE — 85007 BL SMEAR W/DIFF WBC COUNT: CPT | Performed by: NURSE PRACTITIONER

## 2023-07-17 PROCEDURE — 84484 ASSAY OF TROPONIN QUANT: CPT

## 2023-07-17 PROCEDURE — 83690 ASSAY OF LIPASE: CPT | Mod: 91 | Performed by: NURSE PRACTITIONER

## 2023-07-17 PROCEDURE — 12000002 HC ACUTE/MED SURGE SEMI-PRIVATE ROOM

## 2023-07-17 PROCEDURE — 25000003 PHARM REV CODE 250: Performed by: NURSE PRACTITIONER

## 2023-07-17 PROCEDURE — 93005 ELECTROCARDIOGRAM TRACING: CPT

## 2023-07-17 PROCEDURE — 93010 ELECTROCARDIOGRAM REPORT: CPT | Mod: ,,, | Performed by: INTERNAL MEDICINE

## 2023-07-17 PROCEDURE — 96375 TX/PRO/DX INJ NEW DRUG ADDON: CPT

## 2023-07-17 PROCEDURE — 96368 THER/DIAG CONCURRENT INF: CPT

## 2023-07-17 PROCEDURE — 85027 COMPLETE CBC AUTOMATED: CPT | Performed by: NURSE PRACTITIONER

## 2023-07-17 PROCEDURE — 82077 ASSAY SPEC XCP UR&BREATH IA: CPT | Performed by: NURSE PRACTITIONER

## 2023-07-17 PROCEDURE — 80053 COMPREHEN METABOLIC PANEL: CPT | Performed by: NURSE PRACTITIONER

## 2023-07-17 PROCEDURE — 96365 THER/PROPH/DIAG IV INF INIT: CPT

## 2023-07-17 PROCEDURE — 96366 THER/PROPH/DIAG IV INF ADDON: CPT

## 2023-07-17 PROCEDURE — 94761 N-INVAS EAR/PLS OXIMETRY MLT: CPT

## 2023-07-17 PROCEDURE — 83735 ASSAY OF MAGNESIUM: CPT | Performed by: NURSE PRACTITIONER

## 2023-07-17 RX ORDER — MAG HYDROX/ALUMINUM HYD/SIMETH 200-200-20
30 SUSPENSION, ORAL (FINAL DOSE FORM) ORAL 4 TIMES DAILY PRN
Status: DISCONTINUED | OUTPATIENT
Start: 2023-07-17 | End: 2023-07-21 | Stop reason: HOSPADM

## 2023-07-17 RX ORDER — GLUCAGON 1 MG
1 KIT INJECTION
Status: DISCONTINUED | OUTPATIENT
Start: 2023-07-17 | End: 2023-07-21 | Stop reason: HOSPADM

## 2023-07-17 RX ORDER — MAGNESIUM SULFATE HEPTAHYDRATE 40 MG/ML
2 INJECTION, SOLUTION INTRAVENOUS
Status: COMPLETED | OUTPATIENT
Start: 2023-07-17 | End: 2023-07-17

## 2023-07-17 RX ORDER — OXYCODONE HYDROCHLORIDE 5 MG/1
5 TABLET ORAL EVERY 6 HOURS PRN
Status: DISCONTINUED | OUTPATIENT
Start: 2023-07-17 | End: 2023-07-21 | Stop reason: HOSPADM

## 2023-07-17 RX ORDER — SODIUM CHLORIDE 0.9 % (FLUSH) 0.9 %
10 SYRINGE (ML) INJECTION EVERY 12 HOURS PRN
Status: DISCONTINUED | OUTPATIENT
Start: 2023-07-17 | End: 2023-07-21 | Stop reason: HOSPADM

## 2023-07-17 RX ORDER — DIAZEPAM 5 MG/1
10 TABLET ORAL EVERY 8 HOURS
Status: DISCONTINUED | OUTPATIENT
Start: 2023-07-18 | End: 2023-07-20

## 2023-07-17 RX ORDER — FOLIC ACID 1 MG/1
1 TABLET ORAL DAILY
Status: DISCONTINUED | OUTPATIENT
Start: 2023-07-18 | End: 2023-07-21 | Stop reason: HOSPADM

## 2023-07-17 RX ORDER — IBUPROFEN 200 MG
16 TABLET ORAL
Status: DISCONTINUED | OUTPATIENT
Start: 2023-07-17 | End: 2023-07-21 | Stop reason: HOSPADM

## 2023-07-17 RX ORDER — ONDANSETRON 2 MG/ML
4 INJECTION INTRAMUSCULAR; INTRAVENOUS
Status: COMPLETED | OUTPATIENT
Start: 2023-07-17 | End: 2023-07-17

## 2023-07-17 RX ORDER — INSULIN ASPART 100 [IU]/ML
0-5 INJECTION, SOLUTION INTRAVENOUS; SUBCUTANEOUS
Status: DISCONTINUED | OUTPATIENT
Start: 2023-07-17 | End: 2023-07-21 | Stop reason: HOSPADM

## 2023-07-17 RX ORDER — TALC
6 POWDER (GRAM) TOPICAL NIGHTLY PRN
Status: DISCONTINUED | OUTPATIENT
Start: 2023-07-17 | End: 2023-07-21 | Stop reason: HOSPADM

## 2023-07-17 RX ORDER — LORAZEPAM 2 MG/ML
4 INJECTION INTRAMUSCULAR
Status: COMPLETED | OUTPATIENT
Start: 2023-07-17 | End: 2023-07-17

## 2023-07-17 RX ORDER — THIAMINE HCL 100 MG
100 TABLET ORAL DAILY
Status: DISCONTINUED | OUTPATIENT
Start: 2023-07-18 | End: 2023-07-21 | Stop reason: HOSPADM

## 2023-07-17 RX ORDER — LORAZEPAM 0.5 MG/1
2 TABLET ORAL EVERY 4 HOURS PRN
Status: DISCONTINUED | OUTPATIENT
Start: 2023-07-17 | End: 2023-07-21

## 2023-07-17 RX ORDER — ALBUTEROL SULFATE 90 UG/1
2 AEROSOL, METERED RESPIRATORY (INHALATION) EVERY 6 HOURS PRN
Status: DISCONTINUED | OUTPATIENT
Start: 2023-07-17 | End: 2023-07-21 | Stop reason: HOSPADM

## 2023-07-17 RX ORDER — ONDANSETRON 2 MG/ML
4 INJECTION INTRAMUSCULAR; INTRAVENOUS EVERY 8 HOURS PRN
Status: DISCONTINUED | OUTPATIENT
Start: 2023-07-17 | End: 2023-07-21 | Stop reason: HOSPADM

## 2023-07-17 RX ORDER — ENOXAPARIN SODIUM 100 MG/ML
40 INJECTION SUBCUTANEOUS EVERY 24 HOURS
Status: DISCONTINUED | OUTPATIENT
Start: 2023-07-17 | End: 2023-07-21 | Stop reason: HOSPADM

## 2023-07-17 RX ORDER — LEVOTHYROXINE SODIUM 50 UG/1
50 TABLET ORAL
Status: DISCONTINUED | OUTPATIENT
Start: 2023-07-18 | End: 2023-07-21 | Stop reason: HOSPADM

## 2023-07-17 RX ORDER — FLUTICASONE FUROATE AND VILANTEROL 100; 25 UG/1; UG/1
1 POWDER RESPIRATORY (INHALATION) DAILY
Status: DISCONTINUED | OUTPATIENT
Start: 2023-07-18 | End: 2023-07-21 | Stop reason: HOSPADM

## 2023-07-17 RX ORDER — NALOXONE HCL 0.4 MG/ML
0.02 VIAL (ML) INJECTION
Status: DISCONTINUED | OUTPATIENT
Start: 2023-07-17 | End: 2023-07-21 | Stop reason: HOSPADM

## 2023-07-17 RX ORDER — ACETAMINOPHEN 500 MG
1000 TABLET ORAL EVERY 8 HOURS PRN
Status: DISCONTINUED | OUTPATIENT
Start: 2023-07-17 | End: 2023-07-21 | Stop reason: HOSPADM

## 2023-07-17 RX ORDER — IBUPROFEN 200 MG
24 TABLET ORAL
Status: DISCONTINUED | OUTPATIENT
Start: 2023-07-17 | End: 2023-07-21 | Stop reason: HOSPADM

## 2023-07-17 RX ORDER — LISINOPRIL 20 MG/1
20 TABLET ORAL DAILY
Status: DISCONTINUED | OUTPATIENT
Start: 2023-07-18 | End: 2023-07-21 | Stop reason: HOSPADM

## 2023-07-17 RX ORDER — POTASSIUM CHLORIDE 7.45 MG/ML
10 INJECTION INTRAVENOUS
Status: COMPLETED | OUTPATIENT
Start: 2023-07-17 | End: 2023-07-17

## 2023-07-17 RX ADMIN — SODIUM CHLORIDE 1000 ML: 9 INJECTION, SOLUTION INTRAVENOUS at 07:07

## 2023-07-17 RX ADMIN — MAGNESIUM SULFATE 2 G: 2 INJECTION INTRAVENOUS at 07:07

## 2023-07-17 RX ADMIN — ONDANSETRON 4 MG: 2 INJECTION INTRAMUSCULAR; INTRAVENOUS at 05:07

## 2023-07-17 RX ADMIN — ONDANSETRON 4 MG: 2 INJECTION INTRAMUSCULAR; INTRAVENOUS at 10:07

## 2023-07-17 RX ADMIN — ENOXAPARIN SODIUM 40 MG: 40 INJECTION SUBCUTANEOUS at 10:07

## 2023-07-17 RX ADMIN — FOLIC ACID 1 MG: 5 INJECTION, SOLUTION INTRAMUSCULAR; INTRAVENOUS; SUBCUTANEOUS at 09:07

## 2023-07-17 RX ADMIN — POTASSIUM CHLORIDE 10 MEQ: 7.46 INJECTION, SOLUTION INTRAVENOUS at 09:07

## 2023-07-17 RX ADMIN — POTASSIUM CHLORIDE 10 MEQ: 7.46 INJECTION, SOLUTION INTRAVENOUS at 07:07

## 2023-07-17 RX ADMIN — THIAMINE HYDROCHLORIDE 500 MG: 100 INJECTION, SOLUTION INTRAMUSCULAR; INTRAVENOUS at 10:07

## 2023-07-17 RX ADMIN — LORAZEPAM 4 MG: 2 INJECTION INTRAMUSCULAR; INTRAVENOUS at 07:07

## 2023-07-17 NOTE — ED TRIAGE NOTES
""I'm an alcoholic...I'm really sick" states her  brought her here. Last drink 1 hour ago. Complaining of headache and nausea. Pt drinks full handle a day. Pt reports tremors  "

## 2023-07-17 NOTE — FIRST PROVIDER EVALUATION
" Emergency Department TeleTriage Encounter Note      CHIEF COMPLAINT    Chief Complaint   Patient presents with    Alcohol Intoxication     "I'm an alcoholic...I'm really sick" states her  brought her here. Last drink 1 hour ago. Complaining of headache and nausea       VITAL SIGNS   Initial Vitals   BP Pulse Resp Temp SpO2   07/17/23 1628 07/17/23 1628 07/17/23 1630 07/17/23 1630 07/17/23 1628   (!) 193/122 (!) 112 18 98.5 °F (36.9 °C) 95 %      MAP       --                   ALLERGIES    Review of patient's allergies indicates:   Allergen Reactions    Lortab [hydrocodone-acetaminophen] Itching    Promethazine Itching and Other (See Comments)       PROVIDER TRIAGE NOTE  63 year old female with history of alcoholism who reports drinks about 750 ml of vodka daily on average, presents to the ER with concerns with sickness and several episodes of nausea and vomiting today. Reports her last drink was about 1 hour ago. Concerned for being intoxicated by  who presents with pt. Also reports 10/10 headache.     Exam: anxious but alert and oriented. Elevated BP and HR.       ORDERS  Labs Reviewed - No data to display    ED Orders (720h ago, onward)      None              Virtual Visit Note: The provider triage portion of this emergency department evaluation and documentation was performed via [employer], a HIPAA-compliant telemedicine application, in concert with a tele-presenter in the room. A face to face patient evaluation with one of my colleagues will occur once the patient is placed in an emergency department room.      DISCLAIMER: This note was prepared with [employer] voice recognition transcription software. Garbled syntax, mangled pronouns, and other bizarre constructions may be attributed to that software system.    "

## 2023-07-18 LAB
ALBUMIN SERPL BCP-MCNC: 3.6 G/DL (ref 3.5–5.2)
ALP SERPL-CCNC: 58 U/L (ref 55–135)
ALT SERPL W/O P-5'-P-CCNC: 17 U/L (ref 10–44)
AMPHET+METHAMPHET UR QL: NEGATIVE
ANION GAP SERPL CALC-SCNC: 12 MMOL/L (ref 8–16)
ANISOCYTOSIS BLD QL SMEAR: SLIGHT
AST SERPL-CCNC: 31 U/L (ref 10–40)
BARBITURATES UR QL SCN>200 NG/ML: NEGATIVE
BASOPHILS # BLD AUTO: 0.04 K/UL (ref 0–0.2)
BASOPHILS NFR BLD: 0.3 % (ref 0–1.9)
BENZODIAZ UR QL SCN>200 NG/ML: ABNORMAL
BILIRUB SERPL-MCNC: 0.7 MG/DL (ref 0.1–1)
BILIRUB UR QL STRIP: NEGATIVE
BUN SERPL-MCNC: 9 MG/DL (ref 8–23)
BZE UR QL SCN: NEGATIVE
CALCIUM SERPL-MCNC: 8.2 MG/DL (ref 8.7–10.5)
CANNABINOIDS UR QL SCN: NEGATIVE
CHLORIDE SERPL-SCNC: 106 MMOL/L (ref 95–110)
CLARITY UR REFRACT.AUTO: CLEAR
CO2 SERPL-SCNC: 23 MMOL/L (ref 23–29)
COLOR UR AUTO: YELLOW
CREAT SERPL-MCNC: 0.7 MG/DL (ref 0.5–1.4)
CREAT UR-MCNC: 100 MG/DL (ref 15–325)
DIFFERENTIAL METHOD: ABNORMAL
EOSINOPHIL # BLD AUTO: 0.1 K/UL (ref 0–0.5)
EOSINOPHIL NFR BLD: 0.6 % (ref 0–8)
ERYTHROCYTE [DISTWIDTH] IN BLOOD BY AUTOMATED COUNT: 15.9 % (ref 11.5–14.5)
EST. GFR  (NO RACE VARIABLE): >60 ML/MIN/1.73 M^2
GLUCOSE SERPL-MCNC: 71 MG/DL (ref 70–110)
GLUCOSE UR QL STRIP: NEGATIVE
HCT VFR BLD AUTO: 35.6 % (ref 37–48.5)
HGB BLD-MCNC: 10.4 G/DL (ref 12–16)
HGB UR QL STRIP: NEGATIVE
IMM GRANULOCYTES # BLD AUTO: 0.02 K/UL (ref 0–0.04)
IMM GRANULOCYTES NFR BLD AUTO: 0.1 % (ref 0–0.5)
KETONES UR QL STRIP: ABNORMAL
LEUKOCYTE ESTERASE UR QL STRIP: NEGATIVE
LYMPHOCYTES # BLD AUTO: 9.7 K/UL (ref 1–4.8)
LYMPHOCYTES NFR BLD: 72 % (ref 18–48)
MAGNESIUM SERPL-MCNC: 1.9 MG/DL (ref 1.6–2.6)
MCH RBC QN AUTO: 27.2 PG (ref 27–31)
MCHC RBC AUTO-ENTMCNC: 29.2 G/DL (ref 32–36)
MCV RBC AUTO: 93 FL (ref 82–98)
METHADONE UR QL SCN>300 NG/ML: NEGATIVE
MONOCYTES # BLD AUTO: 0.7 K/UL (ref 0.3–1)
MONOCYTES NFR BLD: 4.9 % (ref 4–15)
NEUTROPHILS # BLD AUTO: 3 K/UL (ref 1.8–7.7)
NEUTROPHILS NFR BLD: 22.1 % (ref 38–73)
NITRITE UR QL STRIP: NEGATIVE
NRBC BLD-RTO: 0 /100 WBC
OPIATES UR QL SCN: NEGATIVE
OXYCODONE UR QL SCN: ABNORMAL
PCP UR QL SCN>25 NG/ML: NEGATIVE
PH UR STRIP: 6 [PH] (ref 5–8)
PHOSPHATE SERPL-MCNC: 2.2 MG/DL (ref 2.7–4.5)
PLATELET # BLD AUTO: 91 K/UL (ref 150–450)
PLATELET BLD QL SMEAR: ABNORMAL
PMV BLD AUTO: 11.3 FL (ref 9.2–12.9)
POCT GLUCOSE: 103 MG/DL (ref 70–110)
POCT GLUCOSE: 136 MG/DL (ref 70–110)
POCT GLUCOSE: 89 MG/DL (ref 70–110)
POTASSIUM SERPL-SCNC: 4.1 MMOL/L (ref 3.5–5.1)
PROT SERPL-MCNC: 6.4 G/DL (ref 6–8.4)
PROT UR QL STRIP: ABNORMAL
RBC # BLD AUTO: 3.83 M/UL (ref 4–5.4)
SODIUM SERPL-SCNC: 141 MMOL/L (ref 136–145)
SP GR UR STRIP: 1.02 (ref 1–1.03)
TOXICOLOGY INFORMATION: ABNORMAL
TRAMADOL UR QL SCN: NEGATIVE
URN SPEC COLLECT METH UR: ABNORMAL
WBC # BLD AUTO: 13.45 K/UL (ref 3.9–12.7)

## 2023-07-18 PROCEDURE — 80053 COMPREHEN METABOLIC PANEL: CPT | Performed by: FAMILY MEDICINE

## 2023-07-18 PROCEDURE — 99223 1ST HOSP IP/OBS HIGH 75: CPT | Mod: ,,, | Performed by: FAMILY MEDICINE

## 2023-07-18 PROCEDURE — 20600001 HC STEP DOWN PRIVATE ROOM

## 2023-07-18 PROCEDURE — 80354 DRUG SCREENING FENTANYL: CPT | Performed by: STUDENT IN AN ORGANIZED HEALTH CARE EDUCATION/TRAINING PROGRAM

## 2023-07-18 PROCEDURE — 81003 URINALYSIS AUTO W/O SCOPE: CPT | Mod: 59 | Performed by: NURSE PRACTITIONER

## 2023-07-18 PROCEDURE — 80373 DRUG SCREENING TRAMADOL: CPT | Performed by: STUDENT IN AN ORGANIZED HEALTH CARE EDUCATION/TRAINING PROGRAM

## 2023-07-18 PROCEDURE — 25000003 PHARM REV CODE 250: Performed by: STUDENT IN AN ORGANIZED HEALTH CARE EDUCATION/TRAINING PROGRAM

## 2023-07-18 PROCEDURE — 25000003 PHARM REV CODE 250: Performed by: FAMILY MEDICINE

## 2023-07-18 PROCEDURE — 83735 ASSAY OF MAGNESIUM: CPT | Performed by: FAMILY MEDICINE

## 2023-07-18 PROCEDURE — 63600175 PHARM REV CODE 636 W HCPCS: Performed by: FAMILY MEDICINE

## 2023-07-18 PROCEDURE — 36415 COLL VENOUS BLD VENIPUNCTURE: CPT | Performed by: FAMILY MEDICINE

## 2023-07-18 PROCEDURE — 99223 PR INITIAL HOSPITAL CARE,LEVL III: ICD-10-PCS | Mod: ,,, | Performed by: FAMILY MEDICINE

## 2023-07-18 PROCEDURE — 80307 DRUG TEST PRSMV CHEM ANLYZR: CPT | Performed by: NURSE PRACTITIONER

## 2023-07-18 PROCEDURE — 85025 COMPLETE CBC W/AUTO DIFF WBC: CPT | Performed by: FAMILY MEDICINE

## 2023-07-18 PROCEDURE — 84100 ASSAY OF PHOSPHORUS: CPT | Performed by: FAMILY MEDICINE

## 2023-07-18 PROCEDURE — 63600175 PHARM REV CODE 636 W HCPCS: Performed by: INTERNAL MEDICINE

## 2023-07-18 PROCEDURE — 80365 DRUG SCREENING OXYCODONE: CPT | Performed by: STUDENT IN AN ORGANIZED HEALTH CARE EDUCATION/TRAINING PROGRAM

## 2023-07-18 RX ORDER — LOPERAMIDE HYDROCHLORIDE 2 MG/1
2 CAPSULE ORAL 4 TIMES DAILY PRN
Status: DISCONTINUED | OUTPATIENT
Start: 2023-07-18 | End: 2023-07-21 | Stop reason: HOSPADM

## 2023-07-18 RX ORDER — PROCHLORPERAZINE EDISYLATE 5 MG/ML
2.5 INJECTION INTRAMUSCULAR; INTRAVENOUS EVERY 6 HOURS PRN
Status: DISCONTINUED | OUTPATIENT
Start: 2023-07-18 | End: 2023-07-21 | Stop reason: HOSPADM

## 2023-07-18 RX ADMIN — DIAZEPAM 10 MG: 5 TABLET ORAL at 09:07

## 2023-07-18 RX ADMIN — LISINOPRIL 20 MG: 20 TABLET ORAL at 09:07

## 2023-07-18 RX ADMIN — PROCHLORPERAZINE EDISYLATE 2.5 MG: 5 INJECTION INTRAMUSCULAR; INTRAVENOUS at 08:07

## 2023-07-18 RX ADMIN — OXYCODONE HYDROCHLORIDE 5 MG: 5 TABLET ORAL at 03:07

## 2023-07-18 RX ADMIN — DIAZEPAM 10 MG: 5 TABLET ORAL at 05:07

## 2023-07-18 RX ADMIN — LOPERAMIDE HYDROCHLORIDE 2 MG: 2 CAPSULE ORAL at 05:07

## 2023-07-18 RX ADMIN — ONDANSETRON 4 MG: 2 INJECTION INTRAMUSCULAR; INTRAVENOUS at 07:07

## 2023-07-18 RX ADMIN — LORAZEPAM 2 MG: 0.5 TABLET ORAL at 05:07

## 2023-07-18 RX ADMIN — THERA TABS 1 TABLET: TAB at 09:07

## 2023-07-18 RX ADMIN — LEVOTHYROXINE SODIUM 50 MCG: 50 TABLET ORAL at 05:07

## 2023-07-18 RX ADMIN — LORAZEPAM 2 MG: 0.5 TABLET ORAL at 06:07

## 2023-07-18 RX ADMIN — FOLIC ACID 1 MG: 1 TABLET ORAL at 09:07

## 2023-07-18 RX ADMIN — DIAZEPAM 10 MG: 5 TABLET ORAL at 01:07

## 2023-07-18 RX ADMIN — LORAZEPAM 2 MG: 0.5 TABLET ORAL at 12:07

## 2023-07-18 RX ADMIN — OXYCODONE HYDROCHLORIDE 5 MG: 5 TABLET ORAL at 05:07

## 2023-07-18 RX ADMIN — Medication 100 MG: at 09:07

## 2023-07-18 RX ADMIN — ACETAMINOPHEN 1000 MG: 500 TABLET ORAL at 08:07

## 2023-07-18 RX ADMIN — ENOXAPARIN SODIUM 40 MG: 40 INJECTION SUBCUTANEOUS at 05:07

## 2023-07-18 RX ADMIN — ACETAMINOPHEN 1000 MG: 500 TABLET ORAL at 07:07

## 2023-07-18 RX ADMIN — PROCHLORPERAZINE EDISYLATE 2.5 MG: 5 INJECTION INTRAMUSCULAR; INTRAVENOUS at 04:07

## 2023-07-18 RX ADMIN — PROCHLORPERAZINE EDISYLATE 2.5 MG: 5 INJECTION INTRAMUSCULAR; INTRAVENOUS at 12:07

## 2023-07-18 NOTE — PLAN OF CARE
Pt admitted overnight from ED. AAOx4. VSS. 2L NC. NSR-ST on tele. CIWA =7. PRN nasuea medications given. Safety measures in place.      Problem: Violence Risk or Actual  Goal: Anger and Impulse Control  Outcome: Ongoing, Progressing     Problem: Adult Inpatient Plan of Care  Goal: Plan of Care Review  Outcome: Ongoing, Progressing  Goal: Patient-Specific Goal (Individualized)  Outcome: Ongoing, Progressing  Goal: Absence of Hospital-Acquired Illness or Injury  Outcome: Ongoing, Progressing  Goal: Optimal Comfort and Wellbeing  Outcome: Ongoing, Progressing  Goal: Readiness for Transition of Care  Outcome: Ongoing, Progressing     Problem: Diabetes Comorbidity  Goal: Blood Glucose Level Within Targeted Range  Outcome: Ongoing, Progressing     Problem: Fluid and Electrolyte Imbalance (Acute Kidney Injury/Impairment)  Goal: Fluid and Electrolyte Balance  Outcome: Ongoing, Progressing     Problem: Oral Intake Inadequate (Acute Kidney Injury/Impairment)  Goal: Optimal Nutrition Intake  Outcome: Ongoing, Progressing     Problem: Renal Function Impairment (Acute Kidney Injury/Impairment)  Goal: Effective Renal Function  Outcome: Ongoing, Progressing

## 2023-07-18 NOTE — H&P
"[facility] - Emergency Dept  [facility] Medicine  History & Physical    Patient Name: Earl Abdul  MRN: [id number]  Patient Class: IP- Inpatient  Admission Date: 7/17/2023  Attending Physician: Arturo Carter*   Primary Care Provider: Andrew Rodriguez MD         Patient information was obtained from patient, past medical records and ER records.     Subjective:     Principal Problem:Alcohol withdrawal syndrome with complication    Chief Complaint:   Chief Complaint   Patient presents with    Alcohol Intoxication     "I'm an alcoholic...I'm really sick" states her  brought her here. Last drink 1 hour ago. Complaining of headache and nausea        HPI: Ms. Abdul is a 63 year old woman with PMH EtOH use disorder with history of prior seizures, multiple admissions for alcohol withdrawal, depression with suicide attempt in 2017, breast cancer, HTN, HLD, fibromyalgia, sarcoidosis, hypothyroidism, T2DM, CLL (not on treatment) and COPD who presented to [facility] with chief complaint of alcohol intoxication and withdrawal. Patient states that she is been drinking a handle of vodka a day.  Patient wishes to detox and goes to [place] for further rehab.  Patient last drink an hour prior to arrival in the ED.  Patient states that she feels very sick.  Endorses chest pain, abdominal pain, vomiting. Most recently admitted for alcohol withdrawal 06/16 to 06/22 and discharged  on valium taper. Multiple similar admissions previously as well including May and April.    In the ED patient afebrile and hemodynamically stable saturating well on room air. Tachycardic, tremulous, diaphoretic despite blood alcohol 231. Patient admitted to the care of medicine for further evaluation and management.       Past Medical History:   Diagnosis Date    Anemia     Anemia     Controlled type 2 diabetes mellitus without complication, without long-term current use of insulin 11/30/2021    COPD (chronic obstructive pulmonary " disease)     Depression     Diverticulitis     Fatty liver     GERD (gastroesophageal reflux disease)     Hyperlipidemia     Hypertension     Pancreatitis     Peptic ulcer disease     Polysubstance abuse     Posterior reversible encephalopathy syndrome     Sarcoidosis of lung     Sarcoidosis of lung     over 30 yrs ago    Seizures     7/2017    Suicide attempt     Suicide ideation        Past Surgical History:   Procedure Laterality Date    APPENDECTOMY      BILATERAL MASTECTOMY Bilateral 10/29/2020    Procedure: MASTECTOMY, BILATERAL;  Surgeon: Baylee Kevin MD;  Location: [address];  Service: General;  Laterality: Bilateral;    BREAST REVISION SURGERY Bilateral 2/11/2021    Procedure: BREAST REVISION SURGERY;  Surgeon: Scottie Johnson MD;  Location: [address];  Service: Plastics;  Laterality: Bilateral;    COLONOSCOPY N/A 7/28/2017    Procedure: COLONOSCOPY;  Surgeon: Aaron Alvarado MD;  Location: [facility];  Service: Endoscopy;  Laterality: N/A;    ESOPHAGOGASTRODUODENOSCOPY  10/7/2016, 11/6/2014    2016 - gastritis, duodenitis, 2014 erosive gastritis    ESOPHAGOGASTRODUODENOSCOPY N/A 2/11/2020    Procedure: ESOPHAGOGASTRODUODENOSCOPY (EGD);  Surgeon: Fawn Garrido MD;  Location: [facility];  Service: Endoscopy;  Laterality: N/A;    ESOPHAGOGASTRODUODENOSCOPY N/A 4/19/2021    Procedure: EGD (ESOPHAGOGASTRODUODENOSCOPY);  Surgeon: Paramjit Martino MD;  Location: [facility];  Service: Endoscopy;  Laterality: N/A;    FLEXIBLE SIGMOIDOSCOPY  11/06/2014    colitis    HYSTERECTOMY      IMPLANTATION OF PERMANENT SACRAL NERVE STIMULATOR N/A 7/12/2022    Procedure: INSERTION, NEUROSTIMULATOR, PERMANENT, SACRAL;  Surgeon: Juaquin Edwards MD;  Location: [facility] OR [address] [place] [address];  Service: Urology;  Laterality: N/A;  1hr    INJECTION FOR SENTINEL NODE IDENTIFICATION Right 10/29/2020    Procedure: INJECTION, FOR SENTINEL NODE IDENTIFICATION;  Surgeon: Baylee Kevin MD;   [address];  Service: General;  Laterality: Right;    INJECTION OF JOINT Right 10/10/2019    Procedure: Injection, Joint RIGHT ILIOPSOAS BURSA/TENDON INJECTION AND RIGHT GLUTEAL TENDON INJECTION WITH STEROID AND LIDOCAINE;  Surgeon: Guillaume Rico MD;  Location: [facility];  Service: Pain Management;  Laterality: Right;  NEEDS CONSENT    INSERTION OF BREAST TISSUE EXPANDER Bilateral 10/29/2020    Procedure: INSERTION, TISSUE EXPANDER, BREAST;  Surgeon: Scottie Johnson MD;  Location: [address];  Service: Plastics;  Laterality: Bilateral;  Right breast: 1082 g  Left breast: 1076 g    LIPOSUCTION Bilateral 2/11/2021    Procedure: LIPOSUCTION;  Surgeon: Scottie Johnson MD;  Location: [address];  Service: Plastics;  Laterality: Bilateral;    mediastenoscopy      REPLACEMENT OF IMPLANT OF BREAST Bilateral 2/11/2021    Procedure: REPLACEMENT, IMPLANT, BREAST;  Surgeon: Scottie Johnson MD;  Location: [address];  Service: Plastics;  Laterality: Bilateral;    SENTINEL LYMPH NODE BIOPSY Right 10/29/2020    Procedure: BIOPSY, LYMPH NODE, SENTINEL;  Surgeon: Baylee Kevin MD;  Location: [address];  Service: General;  Laterality: Right;    TONSILLECTOMY N/A 1970    TUBAL LIGATION         Review of patient's allergies indicates:   Allergen Reactions    Lortab [hydrocodone-acetaminophen] Itching    Promethazine Itching and Other (See Comments)       Current Facility-Administered Medications on File Prior to Encounter   Medication    albuterol sulfate nebulizer solution 2.5 mg     Current Outpatient Medications on File Prior to Encounter   Medication Sig    acetaminophen (TYLENOL) 500 MG tablet Take 500-1,500 mg by mouth daily as needed for Pain.    albuterol (PROVENTIL/VENTOLIN HFA) 90 mcg/actuation inhaler     chlordiazepoxide (LIBRIUM) 25 MG Cap 1 PO TID for 5 days then 1 PO BID for 5 days then 1 PO QD for 5 days    diazePAM (VALIUM) 5 MG tablet Take  one tablet tonight, followed by 1 tablet three times a day tomorrow, then 1 tablet twice a day on  then 1 tablet on  night then stop. (Patient not taking: Reported on 2023)    dicyclomine (BENTYL) 20 mg tablet Take 20 mg by mouth 4 (four) times daily.    DULoxetine (CYMBALTA) 60 MG capsule Take 60 mg by mouth.    fluticasone furoate-vilanteroL (BREO ELLIPTA) 100-25 mcg/dose diskus inhaler Inhale 1 puff into the lungs once daily. Controller    folic acid (FOLVITE) 1 MG tablet Take 1 tablet (1 mg total) by mouth once daily.    gabapentin (NEURONTIN) 600 MG tablet Take 1 tablet (600 mg total) by mouth 3 (three) times daily. for 10 days    levothyroxine (SYNTHROID) 50 MCG tablet Take 1 tablet (50 mcg total) by mouth before breakfast.    lisinopriL (PRINIVIL,ZESTRIL) 20 MG tablet Take 1 tablet (20 mg total) by mouth once daily.    loperamide (IMODIUM) 2 mg capsule Take 2 mg by mouth 4 (four) times daily as needed for Diarrhea (Per package directions).    melatonin (MELATIN) Take by mouth nightly as needed for Insomnia.    metFORMIN (GLUCOPHAGE-XR) 500 MG ER 24hr tablet Take 2 tablets (1,000 mg total) by mouth 2 (two) times daily with meals.    multivitamin Tab Take 1 tablet by mouth once daily.    ondansetron (ZOFRAN-ODT) 4 MG TbDL Take 1 tablet (4 mg total) by mouth every 6 (six) hours as needed (nausea/vomiting).    pantoprazole (PROTONIX) 40 MG tablet Take 40 mg by mouth once daily.    thiamine 100 MG tablet Take 1 tablet (100 mg total) by mouth once daily.     Family History       Problem Relation (Age of Onset)    Breast cancer Maternal Aunt, Daughter    Colon cancer Maternal Uncle    Diabetes Father, Mother    Heart attack Father    Hypertension Father, Mother          Tobacco Use    Smoking status: Every Day     Packs/day: 0.50     Years: 30.00     Pack years: 15.00     Types: Vaping with nicotine, Cigarettes     Last attempt to quit: 2021     Years since quittin.4    Smokeless  tobacco: Never   Substance and Sexual Activity    Alcohol use: Yes     Comment: vodka daily (half a regular bottle)    Drug use: Yes     Types: Marijuana     Comment: gummies    Sexual activity: Yes     Birth control/protection: Surgical     Review of Systems   Constitutional:  Positive for appetite change, chills and diaphoresis.   HENT:  Negative for sore throat and trouble swallowing.    Eyes:  Negative for photophobia and visual disturbance.   Respiratory:  Negative for cough, shortness of breath and wheezing.    Cardiovascular:  Positive for palpitations. Negative for chest pain and leg swelling.   Gastrointestinal:  Positive for nausea and vomiting. Negative for abdominal distention, abdominal pain, constipation and diarrhea.   Genitourinary:  Negative for dysuria and hematuria.   Musculoskeletal:  Negative for neck pain and neck stiffness.   Skin:  Negative for rash and wound.   Neurological:  Positive for weakness and headaches. Negative for seizures, syncope, light-headedness and numbness.   Psychiatric/Behavioral:  Negative for confusion and decreased concentration.    Objective:     Vital Signs (Most Recent):  Temp: 98.2 °F (36.8 °C) (07/17/23 2321)  Pulse: 90 (07/18/23 0000)  Resp: 17 (07/18/23 0000)  BP: (!) 150/68 (07/18/23 0000)  SpO2: (!) 93 % (07/18/23 0000) Vital Signs (24h Range):  Temp:  [98.2 °F (36.8 °C)-99.2 °F (37.3 °C)] 98.2 °F (36.8 °C)  Pulse:  [] 90  Resp:  [17-18] 17  SpO2:  [91 %-97 %] 93 %  BP: (143-193)/() 150/68     Weight: 81.6 kg (180 lb)  Body mass index is 29.05 kg/m².     Physical Exam  Constitutional:       General: She is not in acute distress.     Appearance: She is ill-appearing and diaphoretic. She is not toxic-appearing.   HENT:      Head: Normocephalic and atraumatic.      Nose: Nose normal.   Eyes:      General: No scleral icterus.     Extraocular Movements: Extraocular movements intact.      Pupils: Pupils are equal, round, and reactive to light.    Cardiovascular:      Rate and Rhythm: Regular rhythm. Tachycardia present.   Pulmonary:      Effort: Pulmonary effort is normal. No respiratory distress.      Breath sounds: No wheezing or rales.   Abdominal:      General: Abdomen is flat. There is no distension.      Palpations: Abdomen is soft.      Tenderness: There is no abdominal tenderness. There is no guarding.   Musculoskeletal:         General: Normal range of motion.      Cervical back: Normal range of motion and neck supple. No rigidity.      Right lower leg: No edema.      Left lower leg: No edema.   Skin:     General: Skin is warm.      Coloration: Skin is not jaundiced or pale.   Neurological:      General: No focal deficit present.      Mental Status: She is alert and oriented to person, place, and time.      Cranial Nerves: No cranial nerve deficit.      Comments: tremulous   Psychiatric:         Mood and Affect: Mood normal.         Behavior: Behavior normal.            CRANIAL NERVES     CN III, IV, VI   Pupils are equal, round, and reactive to light.     Significant Labs: All pertinent labs within the past 24 hours have been reviewed.  CBC:   Recent Labs   Lab 07/17/23  1656   WBC 38.02*   HGB 12.1   HCT 38.3        CMP:   Recent Labs   Lab 07/17/23  1656      K 3.3*      CO2 20*   *   BUN 12   CREATININE 0.8   CALCIUM 9.1   PROT 7.5   ALBUMIN 4.1   BILITOT 0.5   ALKPHOS 70   AST 33   ALT 24   ANIONGAP 19*     Urine Studies: No results for input(s): COLORU, APPEARANCEUA, PHUR, SPECGRAV, PROTEINUA, GLUCUA, KETONESU, BILIRUBINUA, OCCULTUA, NITRITE, UROBILINOGEN, LEUKOCYTESUR, RBCUA, WBCUA, BACTERIA, SQUAMEPITHEL, HYALINECASTS in the last 48 hours.    Invalid input(s): ANDREY    Significant Imaging: I have reviewed all pertinent imaging results/findings within the past 24 hours.    Assessment/Plan:     * Alcohol withdrawal syndrome with complication  - start valium 10mg po q8h scheduled  - banana bagx1 in ED  - [facility]  q4h  - ativan prn CIWA  - daily FA/MV/thiamine  - fall and seizure precautions  - addiction Psych consult in am  - further management pending clinical course and future study review      CLL (chronic lymphocytic leukemia)   Established with Dr. Gonzalez. Not currently on treatment.   - Chronic thrombocytopenia noted.  Possibly associated with alcohol use.   - Outpatient Heme-Onc follow up        Hypothyroidism  - resume home synthroid      COPD (chronic obstructive pulmonary disease)  - not on oxygen at home   - continue home Breo, combivent  - prn albuterol       Malignant neoplasm of central portion of right breast in female, estrogen receptor positive  - T1b N0 stage IA breast cancer diagnosed October 2020.   - S/p bilateral mastectomy with no adjuvant therapy; received Letrozole February 2021-may 2021        History of sarcoidosis  - Patient with documented history of sarcoidosis.  Not currently on treatment         Essential hypertension  - resume home lisinopril        VTE Risk Mitigation (From admission, onward)         Ordered     enoxaparin injection 40 mg  Daily         07/17/23 2231     IP VTE HIGH RISK PATIENT  Once         07/17/23 2231     Place sequential compression device  Until discontinued         07/17/23 2231                           Will Veliz MD  Department of Hospital Medicine  [facility] - Emergency Dept

## 2023-07-18 NOTE — PLAN OF CARE
I have reviewed the chart of Earl Abdul and collaborated with Arturo Carter* in the care of the patient who is hospitalized for the following:    Active Hospital Problems    Diagnosis    *Alcohol withdrawal syndrome with complication    Acute hypoxemic respiratory failure    CLL (chronic lymphocytic leukemia)    Hypothyroidism    COPD (chronic obstructive pulmonary disease)    Malignant neoplasm of central portion of right breast in female, estrogen receptor positive    Hypokalemia    History of sarcoidosis    Essential hypertension          I have reviewed the Earl Abdul with the multidisciplinary team during discharge huddle.       Enoc Alarcon PA-C  [facility]

## 2023-07-18 NOTE — NURSING
Nurses Note -- 4 Eyes      7/18/2023   5:22 AM      Skin assessed during: Admit      [x] No Altered Skin Integrity Present    [x]Prevention Measures Documented      [] Yes- Altered Skin Integrity Present or Discovered   [] LDA Added if Not in Epic (Describe Wound)   [] New Altered Skin Integrity was Present on Admit and Documented in LDA   [] Wound Image Taken    Wound Care Consulted? No    Attending Nurse:  Tara Moreno RN     Second RN/Staff Member:  CLAUDIA Alvarez

## 2023-07-18 NOTE — ED PROVIDER NOTES
"Encounter Date: 7/17/2023       History     Chief Complaint   Patient presents with    Alcohol Intoxication     "I'm an alcoholic...I'm really sick" states her  brought her here. Last drink 1 hour ago. Complaining of headache and nausea     Earl Abdul is a 63 y.o. female with PMH of alcohol use disorder, COPD, leukemia, sarcoidosis, presenting to [facility] ED for alcohol intoxication.  Patient states that she is been drinking a handle of vodka a day.  Patient wishes to detox and goes to [place] for further rehab.  Patient last drink an hour prior to arrival in the ED.  Patient states that she feels very sick.  Endorses chest pain, abdominal pain, vomiting.      Review of patient's allergies indicates:   Allergen Reactions    Lortab [hydrocodone-acetaminophen] Itching    Promethazine Itching and Other (See Comments)     Past Medical History:   Diagnosis Date    Anemia     Anemia     Controlled type 2 diabetes mellitus without complication, without long-term current use of insulin 11/30/2021    COPD (chronic obstructive pulmonary disease)     Depression     Diverticulitis     Fatty liver     GERD (gastroesophageal reflux disease)     Hyperlipidemia     Hypertension     Pancreatitis     Peptic ulcer disease     Polysubstance abuse     Posterior reversible encephalopathy syndrome     Sarcoidosis of lung     Sarcoidosis of lung     over 30 yrs ago    Seizures     7/2017    Suicide attempt     Suicide ideation      Past Surgical History:   Procedure Laterality Date    APPENDECTOMY      BILATERAL MASTECTOMY Bilateral 10/29/2020    Procedure: MASTECTOMY, BILATERAL;  Surgeon: Baylee Kevin MD;  Location: [address];  Service: General;  Laterality: Bilateral;    BREAST REVISION SURGERY Bilateral 2/11/2021    Procedure: BREAST REVISION SURGERY;  Surgeon: Scottie Johnson MD;  Location: [address];  Service: Plastics;  Laterality: Bilateral;    COLONOSCOPY N/A 7/28/2017    Procedure: COLONOSCOPY;  " Surgeon: Aaron Alvarado MD;  Location: [facility] ENDO;  Service: Endoscopy;  Laterality: N/A;    ESOPHAGOGASTRODUODENOSCOPY  10/7/2016, 11/6/2014    2016 - gastritis, duodenitis, 2014 erosive gastritis    ESOPHAGOGASTRODUODENOSCOPY N/A 2/11/2020    Procedure: ESOPHAGOGASTRODUODENOSCOPY (EGD);  Surgeon: Fawn Garrido MD;  Location: [facility] ENDO;  Service: Endoscopy;  Laterality: N/A;    ESOPHAGOGASTRODUODENOSCOPY N/A 4/19/2021    Procedure: EGD (ESOPHAGOGASTRODUODENOSCOPY);  Surgeon: Paramjit Martino MD;  Location: [facility] ENDO;  Service: Endoscopy;  Laterality: N/A;    FLEXIBLE SIGMOIDOSCOPY  11/06/2014    colitis    HYSTERECTOMY      IMPLANTATION OF PERMANENT SACRAL NERVE STIMULATOR N/A 7/12/2022    Procedure: INSERTION, NEUROSTIMULATOR, PERMANENT, SACRAL;  Surgeon: Juaquin Edwards MD;  Location: [facility] OR [address] [place] [address];  Service: Urology;  Laterality: N/A;  1hr    INJECTION FOR SENTINEL NODE IDENTIFICATION Right 10/29/2020    Procedure: INJECTION, FOR SENTINEL NODE IDENTIFICATION;  Surgeon: Baylee Kevin MD;  Location: [facility] OR [address] [place] [address];  Service: General;  Laterality: Right;    INJECTION OF JOINT Right 10/10/2019    Procedure: Injection, Joint RIGHT ILIOPSOAS BURSA/TENDON INJECTION AND RIGHT GLUTEAL TENDON INJECTION WITH STEROID AND LIDOCAINE;  Surgeon: Guillaume Rico MD;  Location: [facility] MGT;  Service: Pain Management;  Laterality: Right;  NEEDS CONSENT    INSERTION OF BREAST TISSUE EXPANDER Bilateral 10/29/2020    Procedure: INSERTION, TISSUE EXPANDER, BREAST;  Surgeon: Scottie Johnson MD;  Location: [facility] OR [address] [place] [address];  Service: Plastics;  Laterality: Bilateral;  Right breast: 1082 g  Left breast: 1076 g    LIPOSUCTION Bilateral 2/11/2021    Procedure: LIPOSUCTION;  Surgeon: Scottie Johnson MD;  Location: [facility] OR [address] [place] [address];  Service: Plastics;  Laterality: Bilateral;    mediastenoscopy      REPLACEMENT OF IMPLANT OF BREAST Bilateral 2/11/2021    Procedure: REPLACEMENT, IMPLANT, BREAST;  Surgeon:  Scottie Johnson MD;  Location: [facility] OR [address] [place] [address];  Service: Plastics;  Laterality: Bilateral;    SENTINEL LYMPH NODE BIOPSY Right 10/29/2020    Procedure: BIOPSY, LYMPH NODE, SENTINEL;  Surgeon: Baylee Kevin MD;  Location: [facility] OR [address] [place] [address];  Service: General;  Laterality: Right;    TONSILLECTOMY N/A     TUBAL LIGATION       Family History   Problem Relation Age of Onset    Heart attack Father     Diabetes Father     Hypertension Father     Diabetes Mother     Hypertension Mother     Breast cancer Maternal Aunt     Colon cancer Maternal Uncle     Breast cancer Daughter     Ovarian cancer Neg Hx     Cancer Neg Hx      Social History     Tobacco Use    Smoking status: Every Day     Packs/day: 0.50     Years: 30.00     Pack years: 15.00     Types: Vaping with nicotine, Cigarettes     Last attempt to quit: 2021     Years since quittin.4    Smokeless tobacco: Never   Substance Use Topics    Alcohol use: Yes     Comment: vodka daily (half a regular bottle)    Drug use: Yes     Types: Marijuana     Comment: gummies     Review of Systems   Constitutional:  Negative for fever.   HENT:  Negative for congestion, rhinorrhea and sore throat.    Eyes:  Negative for visual disturbance.   Respiratory:  Negative for cough and shortness of breath.    Cardiovascular:  Negative for chest pain and leg swelling.   Gastrointestinal:  Positive for abdominal pain, diarrhea, nausea and vomiting.   Genitourinary:  Negative for dysuria and hematuria.   Neurological:  Positive for tremors. Negative for seizures, speech difficulty, weakness, light-headedness, numbness and headaches.     Physical Exam     Initial Vitals   BP Pulse Resp Temp SpO2   23 1628 23 1628 23 1630 23 1630 23 1628   (!) 193/122 (!) 112 18 98.5 °F (36.9 °C) 95 %      MAP       --                Physical Exam    Nursing note and vitals reviewed.  Constitutional: She appears well-developed and well-nourished. She is cooperative.   Non-toxic appearance. She does not appear ill.   HENT:   Head: Normocephalic and atraumatic.   Mouth/Throat: Mucous membranes are normal. Mucous membranes are not dry.   Eyes: Conjunctivae are normal. Pupils are equal, round, and reactive to light.   Neck: Trachea normal and phonation normal.   Cardiovascular:  Normal rate, regular rhythm, normal heart sounds, intact distal pulses and normal pulses.     Exam reveals no gallop, no S3, no S4 and no friction rub.       No murmur heard.  Pulmonary/Chest: Breath sounds normal. No respiratory distress. She has no wheezes. She has no rhonchi. She has no rales.   Abdominal: Abdomen is soft. She exhibits no distension. There is no abdominal tenderness. There is no rebound.   Musculoskeletal:      Right lower leg: No edema.      Left lower leg: No edema.     Neurological: She is alert and oriented to person, place, and time. She has normal strength. No cranial nerve deficit or sensory deficit. GCS score is 15. GCS eye subscore is 4. GCS verbal subscore is 5. GCS motor subscore is 6.   Mild fasciculations of the tongue.    Asterixis of the bilateral hands.   Skin: Skin is warm, dry and intact. Capillary refill takes less than 2 seconds.   Psychiatric: She has a normal mood and affect. Her speech is normal.       ED Course   Procedures  Labs Reviewed   CBC W/ AUTO DIFFERENTIAL - Abnormal; Notable for the following components:       Result Value    WBC 38.02 (*)     MCHC 31.6 (*)     RDW 16.3 (*)     Gran % 14.0 (*)     Lymph % 85.0 (*)     Mono % 1.0 (*)     All other components within normal limits   COMPREHENSIVE METABOLIC PANEL - Abnormal; Notable for the following components:    Potassium 3.3 (*)     CO2 20 (*)     Glucose 135 (*)     Anion Gap 19 (*)     All other components within normal limits   ALCOHOL,MEDICAL (ETHANOL) - Abnormal; Notable for the following components:    Alcohol, Serum 231 (*)     All other components within normal limits   LIPASE   MAGNESIUM   B-TYPE  NATRIURETIC PEPTIDE   TROPONIN I   LIPASE     EKG Readings: (Independently Interpreted)   Initial Reading: No STEMI. Previous EKG: Compared with most recent EKG Rhythm: Sinus Tachycardia. Heart Rate: 104. Ectopy: No Ectopy. Conduction: Normal. ST Segments: Normal ST Segments. T Waves Flipped: AVR and V1. Axis: Normal. Clinical Impression: Sinus Tachycardia   ECG Results              EKG 12-lead (Final result)  Result time 07/17/23 20:10:24      Final result by Interface, Lab In [facility] (07/17/23 20:10:24)                   Narrative:    Test Reason : R00.0,    Vent. Rate : 104 BPM     Atrial Rate : 104 BPM     P-R Int : 140 ms          QRS Dur : 088 ms      QT Int : 346 ms       P-R-T Axes : 076 069 075 degrees     QTc Int : 454 ms    Sinus tachycardia  Otherwise normal ECG  When compared with ECG of 16-JUN-2023 17:37,  The axis Shifted right  T wave inversion no longer evident in Inferior leads  Confirmed by Sg STEPHENSON MD ([id number]) on 7/17/2023 8:10:14 PM    Referred By: [email or web address]   SELF           Confirmed By:Sg STEPHENSON MD                                  Imaging Results              CT Head Without Contrast (Final result)  Result time 07/17/23 21:01:48      Final result by Mynor Yang MD (07/17/23 21:01:48)                   Impression:      No acute abnormality.      Electronically signed by: Mynor Yang  Date:    07/17/2023  Time:    21:01               Narrative:    EXAMINATION:  CT HEAD WITHOUT CONTRAST    CLINICAL HISTORY:  Headache, new or worsening (Age >= 50y);    TECHNIQUE:  Low dose axial CT images obtained throughout the head without intravenous contrast. Sagittal and coronal reconstructions were performed.    COMPARISON:  06/16/2023    FINDINGS:  Intracranial compartment:    Ventricles and sulci are normal in size for age without evidence of hydrocephalus. No extra-axial blood or fluid collections.    The brain parenchyma appears normal. No parenchymal mass, hemorrhage, edema or major vascular  distribution infarct.    Skull/extracranial contents (limited evaluation): No fracture. Mastoid air cells and paranasal sinuses are essentially clear.                                       X-Ray Chest AP Portable (Final result)  Result time 07/17/23 20:34:00      Final result by Emery Burt MD (07/17/23 20:34:00)                   Impression:      Retrocardiac subsegmental atelectasis left base, similar to prior.    No significant change from prior study.      Electronically signed by: Emery Burt MD  Date:    07/17/2023  Time:    20:34               Narrative:    EXAMINATION:  XR CHEST AP PORTABLE    CLINICAL HISTORY:  Chest pain, unspecified    TECHNIQUE:  Single frontal view of the chest was performed.    COMPARISON:  06/16/2023.    FINDINGS:  Patient rotated to the left.  Asymmetry of the soft tissues of the breast overlying the chest resulting in asymmetry of lung field soft tissue attenuation.  Surgical clips in the breasts, similar to prior.    Retrocardiac subsegmental atelectasis left base, similar to prior.    Heart and lungs  appear unchanged when allowing for differences in technique and positioning.                                       Medications   diazePAM tablet 10 mg (10 mg Oral Given 7/18/23 1324)   LORazepam tablet 2 mg (2 mg Oral Given 7/18/23 1751)   multivitamin tablet (1 tablet Oral Given 7/18/23 0901)   folic acid tablet 1 mg (1 mg Oral Given 7/18/23 0901)   thiamine tablet 100 mg (100 mg Oral Given 7/18/23 0901)   fluticasone furoate-vilanteroL 100-25 mcg/dose diskus inhaler 1 puff ( Inhalation Canceled Entry 7/18/23 0900)   levothyroxine tablet 50 mcg (50 mcg Oral Given 7/18/23 0531)   lisinopriL tablet 20 mg (20 mg Oral Given 7/18/23 0901)   sodium chloride 0.9% flush 10 mL (has no administration in time range)   naloxone 0.4 mg/mL injection 0.02 mg (has no administration in time range)   glucose chewable tablet 16 g (has no administration in time range)   glucose chewable  tablet 24 g (has no administration in time range)   glucagon (human recombinant) injection 1 mg (has no administration in time range)   enoxaparin injection 40 mg (40 mg Subcutaneous Given 7/18/23 1749)   acetaminophen tablet 1,000 mg (1,000 mg Oral Given 7/18/23 0749)   oxyCODONE immediate release tablet 5 mg (5 mg Oral Given 7/18/23 1541)   ondansetron injection 4 mg (4 mg Intravenous Given 7/18/23 0749)   insulin aspart U-100 pen 0-5 Units (has no administration in time range)   albuterol inhaler 2 puff (has no administration in time range)   melatonin tablet 6 mg (has no administration in time range)   aluminum-magnesium hydroxide-simethicone 200-200-20 mg/5 mL suspension 30 mL (has no administration in time range)   dextrose 10% bolus 125 mL 125 mL (has no administration in time range)   dextrose 10% bolus 250 mL 250 mL (has no administration in time range)   prochlorperazine injection Soln 2.5 mg (2.5 mg Intravenous Given 7/18/23 1200)   loperamide capsule 2 mg (2 mg Oral Given 7/18/23 1749)   sodium chloride 0.9% bolus 1,000 mL 1,000 mL (0 mLs Intravenous Stopped 7/17/23 2248)   ondansetron injection 4 mg (4 mg Intravenous Given 7/17/23 1701)   LORazepam injection 4 mg (4 mg Intravenous Given 7/17/23 1906)   magnesium sulfate 2g in water 50mL IVPB (premix) (0 g Intravenous Stopped 7/17/23 2226)   potassium chloride 10 mEq in 100 mL IVPB (0 mEq Intravenous Stopped 7/17/23 2248)   folic acid 1 mg in dextrose 5 % (D5W) 100 mL IVPB (0 mg Intravenous Stopped 7/17/23 2319)   thiamine (B-1) 500 mg in dextrose 5 % (D5W) 100 mL IVPB (0 mg Intravenous Stopped 7/17/23 2319)     Medical Decision Making:   Initial Assessment:   63-year-old female with a history of alcohol use disorder presenting for alcohol intoxication.  Initially, patient is hypertensive and tachycardic concerning for alcohol withdrawal.  Differential Diagnosis:   Alcohol withdrawal, vitamin deficiency, electrolyte abnormality, intracranial pathology,  atypical ACS, pneumonia, tracks, pulmonary edema, pancreatitis  Clinical Tests:   Lab Tests: Ordered and Reviewed  The following lab test(s) were unremarkable: CBC, CMP, Urinalysis, BNP, Troponin and Lipase  Radiological Study: Ordered and Reviewed  Medical Tests: Ordered and Reviewed  ED Management:  Patient presents with tongue fasciculations, asterixis in the setting of significant alcohol use.  Patient's or of ears to be in active alcohol withdrawal.  Will give 4 mg IV Ativan.  Additionally patient given Zofran for nausea/vomiting.  Will provide folic acid, thiamine, magnesium, potassium.  1 L normal saline for fluid resuscitation.    Workup as detailed below in ED course.  Workup significant for ethanol level of 231.  Since patient is in active alcohol withdrawal despite elevated ethanol level this is concerning for significant alcohol abuse.  Additionally, patient has a significant leukocytosis of of baseline concerning for relapse of leukemia.  Patient has mild acidosis consistent with ketoacidosis.    Discussed patient's case with Hospital Medicine who agreed to admit patient to their service for further management of alcohol withdrawal and workup of patient's leukocytosis.            Attending Attestation:   Physician Attestation Statement for Resident:  As the supervising MD   Physician Attestation Statement: I have personally seen and examined this patient.   I agree with the above history.  -:   As the supervising MD I agree with the above PE.     As the supervising MD I agree with the above treatment, course, plan, and disposition.                  ED Course as of 07/1958 Mon Jul 17, 2023 1835 WBC(!): 38.02  Significant leukocytosis above baseline. [ES]   1836 Alcohol, Serum(!): 231 [ES]   1836 Potassium(!): 3.3 [ES]   1836 Magnesium: 1.7 [ES]   1836 Lipase: 20 [ES]   2111 CT Head Without Contrast  No acute intracranial pathology. [ES]   2112 X-Ray Chest AP Portable  Independent  interpretation:  No pneumonia, pneumothorax, pulmonary edema. [ES]      ED Course User Index  [ES] Angelina Mendoza MD                 Clinical Impression:   Final diagnoses:  [R00.0] Tachycardia  [R07.9] Chest pain  [F10.930] Alcohol withdrawal syndrome without complication (Primary)  [D72.829] Leukocytosis, unspecified type        ED Disposition Condition    Admit Stable                Angelina Mendoza MD  Resident  07/17/23 [id number]       Fredrick Santana MD  07/18/23 8990

## 2023-07-18 NOTE — PLAN OF CARE
[facility] ([place] [address])  Initial Discharge Assessment       Primary Care Provider: Andrew Rodriguez MD    Admission Diagnosis: Tachycardia [R00.0]  Chest pain [R07.9]  Alcohol withdrawal syndrome without complication [F10.930]  Leukocytosis, unspecified type [D72.829]    Admission Date: 7/17/2023  Expected Discharge Date: 7/21/2023    Transition of Care Barriers: (P) None    Payor: [employer] / Plan: [facility] ALL SAVERS / Product Type: Commercial /     Extended Emergency Contact Information  Primary Emergency Contact: Henrique Abdul  Address: [address] [place] [address] [place] [address] [facility]  Home Phone: [phone]  Relation: Spouse  Secondary Emergency Contact: Carlos Suazo  Mobile Phone: [phone]  Relation: Son    Discharge Plan A: (P) Home Health  Discharge Plan B: (P) Home Health      [id number] - [place] - [address] AT [facility]  [address] [facility] [address]  Phone: [phone] Fax: [phone]      Initial Assessment (most recent)       Adult Discharge Assessment - 07/18/23 1637          Discharge Assessment    Assessment Type Discharge Planning Assessment (P)      Confirmed/corrected address, phone number and insurance Yes (P)      Confirmed Demographics Correct on Facesheet (P)      Source of Information patient (P)      Does patient/caregiver understand observation status Yes (P)      Communicated BECCA with patient/caregiver Yes (P)      Reason For Admission Tachycardia (P)      People in Home spouse (P)      Facility Arrived From: HOME (P)      Do you expect to return to your current living situation? Yes (P)      Do you have help at home or someone to help you manage your care at home? Yes (P)      Who are your caregiver(s) and their phone number(s)? Henrique Abdul, spouse, [phone] (P)      Prior to hospitilization cognitive status: Alert/Oriented (P)      Current cognitive status:  Alert/Oriented (P)      Equipment Currently Used at Home oxygen (P)      Readmission within 30 days? No (P)      Patient currently being followed by outpatient case management? No (P)      Do you currently have service(s) that help you manage your care at home? No (P)      Do you take prescription medications? Yes (P)      Do you have prescription coverage? Yes (P)      Coverage [employer] (P)      Do you have any problems affording any of your prescribed medications? No (P)      Is the patient taking medications as prescribed? yes (P)      Who is going to help you get home at discharge? Henrique Abdul, spouse [phone] (P)      How do you get to doctors appointments? car, drives self;family or friend will provide (P)      Are you on dialysis? No (P)      Do you take coumadin? No (P)      Discharge Plan A Home Health (P)      Discharge Plan B Home Health (P)      DME Needed Upon Discharge  none (P)      Discharge Plan discussed with: Patient (P)      Transition of Care Barriers None (P)         Physical Activity    On average, how many days per week do you engage in moderate to strenuous exercise (like a brisk walk)? 3 days (P)      On average, how many minutes do you engage in exercise at this level? 10 min (P)         Financial Resource Strain    How hard is it for you to pay for the very basics like food, housing, medical care, and heating? Not very hard (P)         Housing Stability    In the last 12 months, was there a time when you were not able to pay the mortgage or rent on time? No (P)      In the last 12 months, was there a time when you did not have a steady place to sleep or slept in a shelter (including now)? No (P)         Transportation Needs    In the past 12 months, has lack of transportation kept you from medical appointments or from getting medications? No (P)      In the past 12 months, has lack of transportation kept you from meetings, work, or from getting things needed for daily living?  No (P)         Food Insecurity    Within the past 12 months, you worried that your food would run out before you got the money to buy more. Never true (P)      Within the past 12 months, the food you bought just didn't last and you didn't have money to get more. Never true (P)         Stress    Do you feel stress - tense, restless, nervous, or anxious, or unable to sleep at night because your mind is troubled all the time - these days? Not at all (P)         Social Connections    In a typical week, how many times do you talk on the phone with family, friends, or neighbors? Three times a week (P)      How often do you get together with friends or relatives? Three times a week (P)      Are you , , , , never , or living with a partner?  (P)         Alcohol Use    Q1: How often do you have a drink containing alcohol? Never (P)      Q2: How many drinks containing alcohol do you have on a typical day when you are drinking? Patient does not drink (P)      Q3: How often do you have six or more drinks on one occasion? Never (P)                         SW me with patient at the bedside to conduct the dc planning assessment. Patient reporting that she admitted to [facility] from home. She is residing in the home with her spouse and is on home oxygen,. Patient states that she is independent with her ADL's and is not on dialysis. Patient spouse will transport her home when stable to dc.       PCP  Andrew Rodriguez MD  [phone]    Emergency    Henrique Abdul, spouse, [phone]        Paz Guerra LMSW  [facility]   [id number]

## 2023-07-18 NOTE — PLAN OF CARE
Pt AAOX4. VSS. Delirium withdrawal precautions exercised. Scheduled Valium and PRN Ativan given. Moderate to full relief obtained. Imodium ordered, for loose stools X2. Purwick in place. Call light in reach. Will continue to monitor.

## 2023-07-18 NOTE — ASSESSMENT & PLAN NOTE
- start valium 10mg po q8h scheduled  - banana bagx1 in ED  - CIWA q4h  - ativan prn CIWA  - daily FA/MV/thiamine  - fall and seizure precautions  - addiction Psych consult in am  - further management pending clinical course and future study review

## 2023-07-18 NOTE — ED TRIAGE NOTES
"Earl Abdul, a 63 y.o. female presents to the ED w/ complaint of alcohol intoxication. Pt states  bought her here because she is really sick. Pt states she drank 7.5 oz one hour ago. Starting to have tremors.    Triage note:  Chief Complaint   Patient presents with    Alcohol Intoxication     "I'm an alcoholic...I'm really sick" states her  brought her here. Last drink 1 hour ago. Complaining of headache and nausea     Review of patient's allergies indicates:   Allergen Reactions    Lortab [hydrocodone-acetaminophen] Itching    Promethazine Itching and Other (See Comments)     Past Medical History:   Diagnosis Date    Anemia     Anemia     Controlled type 2 diabetes mellitus without complication, without long-term current use of insulin 11/30/2021    COPD (chronic obstructive pulmonary disease)     Depression     Diverticulitis     Fatty liver     GERD (gastroesophageal reflux disease)     Hyperlipidemia     Hypertension     Pancreatitis     Peptic ulcer disease     Polysubstance abuse     Posterior reversible encephalopathy syndrome     Sarcoidosis of lung     Sarcoidosis of lung     over 30 yrs ago    Seizures     7/2017    Suicide attempt     Suicide ideation     Patient identifiers for Earl Abdul checked and correct.    LOC: The patient is awake, alert and aware of environment with an appropriate affect, the patient is oriented x 4 and speaking appropriately.    APPEARANCE: Patient resting comfortably and in no acute distress, patient is clean and well groomed, patient's clothing is properly fastened.    SKIN: The skin is warm and dry, color consistent with ethnicity, patient has normal skin turgor and moist mucus membranes, skin intact, no breakdown or bruising noted.    MUSCULOSKELETAL: Patient moving all extremities well, no obvious swelling or deformities noted.    RESPIRATORY: Airway is open and patent, respirations are spontaneous and even, patient has a normal effort and " rate.    CARDIAC: Patient has a normal rate and rhythm, no periphreal edema noted, capillary refill < 3 seconds.    ABDOMEN: Soft and non tender to palpation, no distention noted. Patient denies any nausea, vomiting, diarrhea, or constipation.     NEUROLOGIC: Eyes open spontaneously, PERRL, behavior appropriate to situation, follows commands, facial expression symmetrical, bilateral hand grasp equal and even, purposeful motor response noted, normal sensation in all extremities. Pt having tremors.    HEENT: No abnormalities noted. White sclera and pupils equal round and reactive to light. Denies headache, dizziness.     : Pt voids independently, denies dysuria, hematuria, frequency.

## 2023-07-18 NOTE — ASSESSMENT & PLAN NOTE
Established with Dr. Gonzalez. Not currently on treatment.   - Chronic thrombocytopenia noted.  Possibly associated with alcohol use.   - Outpatient Heme-Onc follow up

## 2023-07-18 NOTE — SUBJECTIVE & OBJECTIVE
Past Medical History:   Diagnosis Date    Anemia     Anemia     Controlled type 2 diabetes mellitus without complication, without long-term current use of insulin 11/30/2021    COPD (chronic obstructive pulmonary disease)     Depression     Diverticulitis     Fatty liver     GERD (gastroesophageal reflux disease)     Hyperlipidemia     Hypertension     Pancreatitis     Peptic ulcer disease     Polysubstance abuse     Posterior reversible encephalopathy syndrome     Sarcoidosis of lung     Sarcoidosis of lung     over 30 yrs ago    Seizures     7/2017    Suicide attempt     Suicide ideation        Past Surgical History:   Procedure Laterality Date    APPENDECTOMY      BILATERAL MASTECTOMY Bilateral 10/29/2020    Procedure: MASTECTOMY, BILATERAL;  Surgeon: Baylee Kevin MD;  Location: [address];  Service: General;  Laterality: Bilateral;    BREAST REVISION SURGERY Bilateral 2/11/2021    Procedure: BREAST REVISION SURGERY;  Surgeon: Scottie Johnson MD;  Location: [facility] OR [address] [place] [address];  Service: Plastics;  Laterality: Bilateral;    COLONOSCOPY N/A 7/28/2017    Procedure: COLONOSCOPY;  Surgeon: Aaron Alvarado MD;  Location: [facility];  Service: Endoscopy;  Laterality: N/A;    ESOPHAGOGASTRODUODENOSCOPY  10/7/2016, 11/6/2014    2016 - gastritis, duodenitis, 2014 erosive gastritis    ESOPHAGOGASTRODUODENOSCOPY N/A 2/11/2020    Procedure: ESOPHAGOGASTRODUODENOSCOPY (EGD);  Surgeon: Fawn Garrido MD;  Location: [facility];  Service: Endoscopy;  Laterality: N/A;    ESOPHAGOGASTRODUODENOSCOPY N/A 4/19/2021    Procedure: EGD (ESOPHAGOGASTRODUODENOSCOPY);  Surgeon: Paramjit Martino MD;  Location: [facility];  Service: Endoscopy;  Laterality: N/A;    FLEXIBLE SIGMOIDOSCOPY  11/06/2014    colitis    HYSTERECTOMY      IMPLANTATION OF PERMANENT SACRAL NERVE STIMULATOR N/A 7/12/2022    Procedure: INSERTION, NEUROSTIMULATOR, PERMANENT, SACRAL;  Surgeon: Juaquin Edwards MD;  Location: [facility] OR [address] [place] [address];  Service:  Urology;  Laterality: N/A;  1hr    INJECTION FOR SENTINEL NODE IDENTIFICATION Right 10/29/2020    Procedure: INJECTION, FOR SENTINEL NODE IDENTIFICATION;  Surgeon: Baylee Kevin MD;  [facility];  Service: General;  Laterality: Right;    INJECTION OF JOINT Right 10/10/2019    Procedure: Injection, Joint RIGHT ILIOPSOAS BURSA/TENDON INJECTION AND RIGHT GLUTEAL TENDON INJECTION WITH STEROID AND LIDOCAINE;  Surgeon: Guillaume Rico MD;  Location: [facility];  Service: Pain Management;  Laterality: Right;  NEEDS CONSENT    INSERTION OF BREAST TISSUE EXPANDER Bilateral 10/29/2020    Procedure: INSERTION, TISSUE EXPANDER, BREAST;  Surgeon: Scottie Johnson MD;  Location: [facility] OR [address] [place] [address];  Service: Plastics;  Laterality: Bilateral;  Right breast: 1082 g  Left breast: 1076 g    LIPOSUCTION Bilateral 2/11/2021    Procedure: LIPOSUCTION;  Surgeon: Scottie Johnson MD;  Location: [facility] OR [address] [place] [address];  Service: Plastics;  Laterality: Bilateral;    mediastenoscopy      REPLACEMENT OF IMPLANT OF BREAST Bilateral 2/11/2021    Procedure: REPLACEMENT, IMPLANT, BREAST;  Surgeon: Scottie Johnson MD;  Location: [address];  Service: Plastics;  Laterality: Bilateral;    SENTINEL LYMPH NODE BIOPSY Right 10/29/2020    Procedure: BIOPSY, LYMPH NODE, SENTINEL;  Surgeon: Baylee Kevin MD;  Location: [address];  Service: General;  Laterality: Right;    TONSILLECTOMY N/A 1970    TUBAL LIGATION         Review of patient's allergies indicates:   Allergen Reactions    Lortab [hydrocodone-acetaminophen] Itching    Promethazine Itching and Other (See Comments)       Current Facility-Administered Medications on File Prior to Encounter   Medication    albuterol sulfate nebulizer solution 2.5 mg     Current Outpatient Medications on File Prior to Encounter   Medication Sig    acetaminophen (TYLENOL) 500 MG tablet Take 500-1,500 mg by mouth daily as needed for Pain.    albuterol (PROVENTIL/VENTOLIN HFA) 90  mcg/actuation inhaler     chlordiazepoxide (LIBRIUM) 25 MG Cap 1 PO TID for 5 days then 1 PO BID for 5 days then 1 PO QD for 5 days    diazePAM (VALIUM) 5 MG tablet Take one tablet tonight, followed by 1 tablet three times a day tomorrow, then 1 tablet twice a day on 6/24 then 1 tablet on 6/25 night then stop. (Patient not taking: Reported on 6/26/2023)    dicyclomine (BENTYL) 20 mg tablet Take 20 mg by mouth 4 (four) times daily.    DULoxetine (CYMBALTA) 60 MG capsule Take 60 mg by mouth.    fluticasone furoate-vilanteroL (BREO ELLIPTA) 100-25 mcg/dose diskus inhaler Inhale 1 puff into the lungs once daily. Controller    folic acid (FOLVITE) 1 MG tablet Take 1 tablet (1 mg total) by mouth once daily.    gabapentin (NEURONTIN) 600 MG tablet Take 1 tablet (600 mg total) by mouth 3 (three) times daily. for 10 days    levothyroxine (SYNTHROID) 50 MCG tablet Take 1 tablet (50 mcg total) by mouth before breakfast.    lisinopriL (PRINIVIL,ZESTRIL) 20 MG tablet Take 1 tablet (20 mg total) by mouth once daily.    loperamide (IMODIUM) 2 mg capsule Take 2 mg by mouth 4 (four) times daily as needed for Diarrhea (Per package directions).    melatonin (MELATIN) Take by mouth nightly as needed for Insomnia.    metFORMIN (GLUCOPHAGE-XR) 500 MG ER 24hr tablet Take 2 tablets (1,000 mg total) by mouth 2 (two) times daily with meals.    multivitamin Tab Take 1 tablet by mouth once daily.    ondansetron (ZOFRAN-ODT) 4 MG TbDL Take 1 tablet (4 mg total) by mouth every 6 (six) hours as needed (nausea/vomiting).    pantoprazole (PROTONIX) 40 MG tablet Take 40 mg by mouth once daily.    thiamine 100 MG tablet Take 1 tablet (100 mg total) by mouth once daily.     Family History       Problem Relation (Age of Onset)    Breast cancer Maternal Aunt, Daughter    Colon cancer Maternal Uncle    Diabetes Father, Mother    Heart attack Father    Hypertension Father, Mother          Tobacco Use    Smoking status: Every Day     Packs/day: 0.50      Years: 30.00     Pack years: 15.00     Types: Vaping with nicotine, Cigarettes     Last attempt to quit: 2021     Years since quittin.4    Smokeless tobacco: Never   Substance and Sexual Activity    Alcohol use: Yes     Comment: vodka daily (half a regular bottle)    Drug use: Yes     Types: Marijuana     Comment: gummies    Sexual activity: Yes     Birth control/protection: Surgical     Review of Systems   Constitutional:  Positive for appetite change, chills and diaphoresis.   HENT:  Negative for sore throat and trouble swallowing.    Eyes:  Negative for photophobia and visual disturbance.   Respiratory:  Negative for cough, shortness of breath and wheezing.    Cardiovascular:  Positive for palpitations. Negative for chest pain and leg swelling.   Gastrointestinal:  Positive for nausea and vomiting. Negative for abdominal distention, abdominal pain, constipation and diarrhea.   Genitourinary:  Negative for dysuria and hematuria.   Musculoskeletal:  Negative for neck pain and neck stiffness.   Skin:  Negative for rash and wound.   Neurological:  Positive for weakness and headaches. Negative for seizures, syncope, light-headedness and numbness.   Psychiatric/Behavioral:  Negative for confusion and decreased concentration.    Objective:     Vital Signs (Most Recent):  Temp: 98.2 °F (36.8 °C) (23 2321)  Pulse: 90 (23 0000)  Resp: 17 (23 0000)  BP: (!) 150/68 (23 0000)  SpO2: (!) 93 % (23 0000) Vital Signs (24h Range):  Temp:  [98.2 °F (36.8 °C)-99.2 °F (37.3 °C)] 98.2 °F (36.8 °C)  Pulse:  [] 90  Resp:  [17-18] 17  SpO2:  [91 %-97 %] 93 %  BP: (143-193)/() 150/68     Weight: 81.6 kg (180 lb)  Body mass index is 29.05 kg/m².     Physical Exam  Constitutional:       General: She is not in acute distress.     Appearance: She is ill-appearing and diaphoretic. She is not toxic-appearing.   HENT:      Head: Normocephalic and atraumatic.      Nose: Nose normal.   Eyes:       General: No scleral icterus.     Extraocular Movements: Extraocular movements intact.      Pupils: Pupils are equal, round, and reactive to light.   Cardiovascular:      Rate and Rhythm: Regular rhythm. Tachycardia present.   Pulmonary:      Effort: Pulmonary effort is normal. No respiratory distress.      Breath sounds: No wheezing or rales.   Abdominal:      General: Abdomen is flat. There is no distension.      Palpations: Abdomen is soft.      Tenderness: There is no abdominal tenderness. There is no guarding.   Musculoskeletal:         General: Normal range of motion.      Cervical back: Normal range of motion and neck supple. No rigidity.      Right lower leg: No edema.      Left lower leg: No edema.   Skin:     General: Skin is warm.      Coloration: Skin is not jaundiced or pale.   Neurological:      General: No focal deficit present.      Mental Status: She is alert and oriented to person, place, and time.      Cranial Nerves: No cranial nerve deficit.      Comments: tremulous   Psychiatric:         Mood and Affect: Mood normal.         Behavior: Behavior normal.            CRANIAL NERVES     CN III, IV, VI   Pupils are equal, round, and reactive to light.     Significant Labs: All pertinent labs within the past 24 hours have been reviewed.  CBC:   Recent Labs   Lab 07/17/23  1656   WBC 38.02*   HGB 12.1   HCT 38.3        CMP:   Recent Labs   Lab 07/17/23  1656      K 3.3*      CO2 20*   *   BUN 12   CREATININE 0.8   CALCIUM 9.1   PROT 7.5   ALBUMIN 4.1   BILITOT 0.5   ALKPHOS 70   AST 33   ALT 24   ANIONGAP 19*     Urine Studies: No results for input(s): COLORU, APPEARANCEUA, PHUR, SPECGRAV, PROTEINUA, GLUCUA, KETONESU, BILIRUBINUA, OCCULTUA, NITRITE, UROBILINOGEN, LEUKOCYTESUR, RBCUA, WBCUA, BACTERIA, SQUAMEPITHEL, HYALINECASTS in the last 48 hours.    Invalid input(s): WRIGHTSUR    Significant Imaging: I have reviewed all pertinent imaging results/findings within the past 24  hours.

## 2023-07-18 NOTE — HPI
Ms. Abdul is a 63 year old woman with PMH EtOH use disorder with history of prior seizures, multiple admissions for alcohol withdrawal, depression with suicide attempt in 2017, breast cancer, HTN, HLD, fibromyalgia, sarcoidosis, hypothyroidism, T2DM, CLL (not on treatment) and COPD who presented to [facility]ED with chief complaint of alcohol intoxication and withdrawal. Patient states that she is been drinking a handle of vodka a day.  Patient wishes to detox and goes to [place] for further rehab.  Patient last drink an hour prior to arrival in the ED.  Patient states that she feels very sick.  Endorses chest pain, abdominal pain, vomiting. Most recently admitted for alcohol withdrawal 06/16 to 06/22 and discharged  on valium taper. Multiple similar admissions previously as well including May and April.

## 2023-07-19 LAB
ALBUMIN SERPL BCP-MCNC: 3.5 G/DL (ref 3.5–5.2)
ALP SERPL-CCNC: 55 U/L (ref 55–135)
ALT SERPL W/O P-5'-P-CCNC: 13 U/L (ref 10–44)
ANION GAP SERPL CALC-SCNC: 8 MMOL/L (ref 8–16)
AST SERPL-CCNC: 23 U/L (ref 10–40)
BASOPHILS # BLD AUTO: 0.02 K/UL (ref 0–0.2)
BASOPHILS NFR BLD: 0.3 % (ref 0–1.9)
BILIRUB SERPL-MCNC: 0.5 MG/DL (ref 0.1–1)
BUN SERPL-MCNC: 6 MG/DL (ref 8–23)
CALCIUM SERPL-MCNC: 8.6 MG/DL (ref 8.7–10.5)
CHLORIDE SERPL-SCNC: 104 MMOL/L (ref 95–110)
CO2 SERPL-SCNC: 27 MMOL/L (ref 23–29)
CREAT SERPL-MCNC: 0.6 MG/DL (ref 0.5–1.4)
DIFFERENTIAL METHOD: ABNORMAL
EOSINOPHIL # BLD AUTO: 0.1 K/UL (ref 0–0.5)
EOSINOPHIL NFR BLD: 1.5 % (ref 0–8)
ERYTHROCYTE [DISTWIDTH] IN BLOOD BY AUTOMATED COUNT: 15.5 % (ref 11.5–14.5)
EST. GFR  (NO RACE VARIABLE): >60 ML/MIN/1.73 M^2
GLUCOSE SERPL-MCNC: 83 MG/DL (ref 70–110)
HCT VFR BLD AUTO: 33.8 % (ref 37–48.5)
HGB BLD-MCNC: 10.3 G/DL (ref 12–16)
IMM GRANULOCYTES # BLD AUTO: 0.01 K/UL (ref 0–0.04)
IMM GRANULOCYTES NFR BLD AUTO: 0.2 % (ref 0–0.5)
LYMPHOCYTES # BLD AUTO: 4.3 K/UL (ref 1–4.8)
LYMPHOCYTES NFR BLD: 73.9 % (ref 18–48)
MAGNESIUM SERPL-MCNC: 1.6 MG/DL (ref 1.6–2.6)
MCH RBC QN AUTO: 27.1 PG (ref 27–31)
MCHC RBC AUTO-ENTMCNC: 30.5 G/DL (ref 32–36)
MCV RBC AUTO: 89 FL (ref 82–98)
MONOCYTES # BLD AUTO: 0.3 K/UL (ref 0.3–1)
MONOCYTES NFR BLD: 4.6 % (ref 4–15)
NEUTROPHILS # BLD AUTO: 1.1 K/UL (ref 1.8–7.7)
NEUTROPHILS NFR BLD: 19.5 % (ref 38–73)
NRBC BLD-RTO: 0 /100 WBC
PHOSPHATE SERPL-MCNC: 2.5 MG/DL (ref 2.7–4.5)
PLATELET # BLD AUTO: 82 K/UL (ref 150–450)
PLATELET BLD QL SMEAR: ABNORMAL
PMV BLD AUTO: 10.6 FL (ref 9.2–12.9)
POCT GLUCOSE: 107 MG/DL (ref 70–110)
POCT GLUCOSE: 118 MG/DL (ref 70–110)
POCT GLUCOSE: 83 MG/DL (ref 70–110)
POCT GLUCOSE: 89 MG/DL (ref 70–110)
POTASSIUM SERPL-SCNC: 3.6 MMOL/L (ref 3.5–5.1)
PROT SERPL-MCNC: 6 G/DL (ref 6–8.4)
RBC # BLD AUTO: 3.8 M/UL (ref 4–5.4)
SODIUM SERPL-SCNC: 139 MMOL/L (ref 136–145)
WBC # BLD AUTO: 5.86 K/UL (ref 3.9–12.7)

## 2023-07-19 PROCEDURE — 80053 COMPREHEN METABOLIC PANEL: CPT | Performed by: FAMILY MEDICINE

## 2023-07-19 PROCEDURE — 99222 1ST HOSP IP/OBS MODERATE 55: CPT | Mod: ,,, | Performed by: PSYCHIATRY & NEUROLOGY

## 2023-07-19 PROCEDURE — 85025 COMPLETE CBC W/AUTO DIFF WBC: CPT | Performed by: FAMILY MEDICINE

## 2023-07-19 PROCEDURE — 63600175 PHARM REV CODE 636 W HCPCS: Performed by: INTERNAL MEDICINE

## 2023-07-19 PROCEDURE — 20600001 HC STEP DOWN PRIVATE ROOM

## 2023-07-19 PROCEDURE — 63600175 PHARM REV CODE 636 W HCPCS: Performed by: STUDENT IN AN ORGANIZED HEALTH CARE EDUCATION/TRAINING PROGRAM

## 2023-07-19 PROCEDURE — 63600175 PHARM REV CODE 636 W HCPCS: Performed by: FAMILY MEDICINE

## 2023-07-19 PROCEDURE — 83735 ASSAY OF MAGNESIUM: CPT | Performed by: FAMILY MEDICINE

## 2023-07-19 PROCEDURE — 84100 ASSAY OF PHOSPHORUS: CPT | Performed by: FAMILY MEDICINE

## 2023-07-19 PROCEDURE — 99900035 HC TECH TIME PER 15 MIN (STAT)

## 2023-07-19 PROCEDURE — 99233 SBSQ HOSP IP/OBS HIGH 50: CPT | Mod: ,,, | Performed by: STUDENT IN AN ORGANIZED HEALTH CARE EDUCATION/TRAINING PROGRAM

## 2023-07-19 PROCEDURE — 94640 AIRWAY INHALATION TREATMENT: CPT

## 2023-07-19 PROCEDURE — 27000221 HC OXYGEN, UP TO 24 HOURS

## 2023-07-19 PROCEDURE — 99233 PR SUBSEQUENT HOSPITAL CARE,LEVL III: ICD-10-PCS | Mod: ,,, | Performed by: STUDENT IN AN ORGANIZED HEALTH CARE EDUCATION/TRAINING PROGRAM

## 2023-07-19 PROCEDURE — 94761 N-INVAS EAR/PLS OXIMETRY MLT: CPT

## 2023-07-19 PROCEDURE — 25000003 PHARM REV CODE 250: Performed by: FAMILY MEDICINE

## 2023-07-19 PROCEDURE — 99222 PR INITIAL HOSPITAL CARE,LEVL II: ICD-10-PCS | Mod: ,,, | Performed by: PSYCHIATRY & NEUROLOGY

## 2023-07-19 PROCEDURE — 25000242 PHARM REV CODE 250 ALT 637 W/ HCPCS: Performed by: FAMILY MEDICINE

## 2023-07-19 PROCEDURE — 36415 COLL VENOUS BLD VENIPUNCTURE: CPT | Performed by: FAMILY MEDICINE

## 2023-07-19 RX ORDER — DIPHENHYDRAMINE HYDROCHLORIDE 50 MG/ML
25 INJECTION INTRAMUSCULAR; INTRAVENOUS ONCE
Status: COMPLETED | OUTPATIENT
Start: 2023-07-19 | End: 2023-07-19

## 2023-07-19 RX ADMIN — PROCHLORPERAZINE EDISYLATE 2.5 MG: 5 INJECTION INTRAMUSCULAR; INTRAVENOUS at 04:07

## 2023-07-19 RX ADMIN — LORAZEPAM 2 MG: 0.5 TABLET ORAL at 04:07

## 2023-07-19 RX ADMIN — Medication 6 MG: at 12:07

## 2023-07-19 RX ADMIN — LORAZEPAM 2 MG: 0.5 TABLET ORAL at 08:07

## 2023-07-19 RX ADMIN — LORAZEPAM 2 MG: 0.5 TABLET ORAL at 12:07

## 2023-07-19 RX ADMIN — Medication 6 MG: at 09:07

## 2023-07-19 RX ADMIN — FLUTICASONE FUROATE AND VILANTEROL TRIFENATATE 1 PUFF: 100; 25 POWDER RESPIRATORY (INHALATION) at 08:07

## 2023-07-19 RX ADMIN — DIAZEPAM 10 MG: 5 TABLET ORAL at 09:07

## 2023-07-19 RX ADMIN — DIAZEPAM 10 MG: 5 TABLET ORAL at 01:07

## 2023-07-19 RX ADMIN — OXYCODONE HYDROCHLORIDE 5 MG: 5 TABLET ORAL at 12:07

## 2023-07-19 RX ADMIN — PROCHLORPERAZINE EDISYLATE 2.5 MG: 5 INJECTION INTRAMUSCULAR; INTRAVENOUS at 01:07

## 2023-07-19 RX ADMIN — DIPHENHYDRAMINE HYDROCHLORIDE 25 MG: 50 INJECTION, SOLUTION INTRAMUSCULAR; INTRAVENOUS at 08:07

## 2023-07-19 RX ADMIN — LORAZEPAM 2 MG: 0.5 TABLET ORAL at 03:07

## 2023-07-19 RX ADMIN — LISINOPRIL 20 MG: 20 TABLET ORAL at 08:07

## 2023-07-19 RX ADMIN — Medication 100 MG: at 08:07

## 2023-07-19 RX ADMIN — ACETAMINOPHEN 1000 MG: 500 TABLET ORAL at 03:07

## 2023-07-19 RX ADMIN — FOLIC ACID 1 MG: 1 TABLET ORAL at 08:07

## 2023-07-19 RX ADMIN — ENOXAPARIN SODIUM 40 MG: 40 INJECTION SUBCUTANEOUS at 04:07

## 2023-07-19 RX ADMIN — ACETAMINOPHEN 1000 MG: 500 TABLET ORAL at 04:07

## 2023-07-19 RX ADMIN — ONDANSETRON 4 MG: 2 INJECTION INTRAMUSCULAR; INTRAVENOUS at 12:07

## 2023-07-19 RX ADMIN — ONDANSETRON 4 MG: 2 INJECTION INTRAMUSCULAR; INTRAVENOUS at 08:07

## 2023-07-19 RX ADMIN — LEVOTHYROXINE SODIUM 50 MCG: 50 TABLET ORAL at 05:07

## 2023-07-19 RX ADMIN — DIAZEPAM 10 MG: 5 TABLET ORAL at 05:07

## 2023-07-19 RX ADMIN — OXYCODONE HYDROCHLORIDE 5 MG: 5 TABLET ORAL at 08:07

## 2023-07-19 RX ADMIN — THERA TABS 1 TABLET: TAB at 08:07

## 2023-07-19 RX ADMIN — OXYCODONE HYDROCHLORIDE 5 MG: 5 TABLET ORAL at 01:07

## 2023-07-19 NOTE — PROGRESS NOTES
[facility] ([address] Medicine  Progress Note    Patient Name: Earl Abdul  MRN: [id number]  Patient Class: IP- Inpatient   Admission Date: 7/17/2023  Length of Stay: 2 days  Attending Physician: Arturo Carter*  Primary Care Provider: Andrew Rodriguez MD        Subjective:     Principal Problem:Alcohol withdrawal syndrome with complication        HPI:  Ms. Abdul is a 63 year old woman with PMH EtOH use disorder with history of prior seizures, multiple admissions for alcohol withdrawal, depression with suicide attempt in 2017, breast cancer, HTN, HLD, fibromyalgia, sarcoidosis, hypothyroidism, T2DM, CLL (not on treatment) and COPD who presented to [facility] with chief complaint of alcohol intoxication and withdrawal. Patient states that she is been drinking a handle of vodka a day.  Patient wishes to detox and goes to [place] for further rehab.  Patient last drink an hour prior to arrival in the ED.  Patient states that she feels very sick.  Endorses chest pain, abdominal pain, vomiting. Most recently admitted for alcohol withdrawal 06/16 to 06/22 and discharged  on valium taper. Multiple similar admissions previously as well including May and April.      Overview/Hospital Course:  In the ED patient afebrile and hemodynamically stable saturating well on room air. Tachycardic, tremulous, diaphoretic despite blood alcohol 231. Patient admitted to the care of medicine for further evaluation and management. Started on valium taper.       Interval History: Patient requiring multiple ativan doses overnight. Notes continued tremulousness. Maintain on 10 valium q8h for now  Afebrile    Review of Systems  Objective:     Vital Signs (Most Recent):  Temp: 97.8 °F (36.6 °C) (07/19/23 1114)  Pulse: 80 (07/19/23 1149)  Resp: 18 (07/19/23 1339)  BP: (!) 174/74 (07/19/23 1114)  SpO2: 95 % (07/19/23 1114) Vital Signs (24h Range):  Temp:  [97.8 °F (36.6 °C)-98.7 °F (37.1 °C)] 97.8 °F  (36.6 °C)  Pulse:  [70-87] 80  Resp:  [18-20] 18  SpO2:  [91 %-99 %] 95 %  BP: (141-174)/(70-98) 174/74     Weight: 81.6 kg (180 lb)  Body mass index is 29.05 kg/m².    Intake/Output Summary (Last 24 hours) at 7/19/2023 1422  Last data filed at 7/19/2023 1111  Gross per 24 hour   Intake 480 ml   Output 1930 ml   Net -1450 ml         Physical Exam  Constitutional:       General: She is not in acute distress.     Appearance: She is ill-appearing. She is not toxic-appearing.   Cardiovascular:      Rate and Rhythm: Regular rhythm. Tachycardia present.   Pulmonary:      Effort: Pulmonary effort is normal. No respiratory distress.      Breath sounds: No wheezing or rales.   Abdominal:      General: Abdomen is flat. There is no distension.      Palpations: Abdomen is soft.      Tenderness: There is no abdominal tenderness. There is no guarding.   Musculoskeletal:         General: Normal range of motion.      Cervical back: Normal range of motion and neck supple. No rigidity.      Right lower leg: No edema.      Left lower leg: No edema.   Skin:     General: Skin is warm.      Coloration: Skin is not jaundiced or pale.   Neurological:      General: No focal deficit present.      Mental Status: She is alert and oriented to person, place, and time.      Cranial Nerves: No cranial nerve deficit.      Comments: tremulous   Psychiatric:         Mood and Affect: Mood normal.         Behavior: Behavior normal.           Significant Labs: All pertinent labs within the past 24 hours have been reviewed.    Significant Imaging: I have reviewed all pertinent imaging results/findings within the past 24 hours.      Assessment/Plan:      * Alcohol withdrawal syndrome with complication  - start valium 10mg po q8h scheduled  - banana bagx1 in ED  - CIWA q4h  - ativan prn CIWA  - daily FA/MV/thiamine  - fall and seizure precautions  - addiction Psych consult   - further management pending clinical course and future study review      CLL  (chronic lymphocytic leukemia)   Established with Dr. Gonzalez. Not currently on treatment.   - Chronic thrombocytopenia noted.  Possibly associated with alcohol use.   - Outpatient Heme-Onc follow up        Hypothyroidism  - resume home synthroid      COPD (chronic obstructive pulmonary disease)  - not on oxygen at home   - continue home Breo, combivent  - prn albuterol       Malignant neoplasm of central portion of right breast in female, estrogen receptor positive  - T1b N0 stage IA breast cancer diagnosed October 2020.   - S/p bilateral mastectomy with no adjuvant therapy; received Letrozole February 2021-may 2021        History of sarcoidosis  - Patient with documented history of sarcoidosis.  Not currently on treatment         Essential hypertension  - resume home lisinopril      VTE Risk Mitigation (From admission, onward)         Ordered     enoxaparin injection 40 mg  Daily         07/17/23 2231     IP VTE HIGH RISK PATIENT  Once         07/17/23 2231     Place sequential compression device  Until discontinued         07/17/23 2231                Discharge Planning   BECCA: 7/21/2023     Code Status: Full Code   Is the patient medically ready for discharge?: No    Reason for patient still in hospital (select all that apply): Patient trending condition and Treatment  Discharge Plan A: Home Health        Arturo Bautista MD  Department of Hospital Medicine   [facility] - [facility] ([facility]

## 2023-07-19 NOTE — PLAN OF CARE
Problem: Violence Risk or Actual  Goal: Anger and Impulse Control  Outcome: Ongoing, Progressing     Problem: Adult Inpatient Plan of Care  Goal: Plan of Care Review  Outcome: Ongoing, Progressing  Goal: Patient-Specific Goal (Individualized)  Outcome: Ongoing, Progressing  Goal: Optimal Comfort and Wellbeing  Outcome: Ongoing, Progressing  Goal: Readiness for Transition of Care  Outcome: Ongoing, Progressing     Problem: Diabetes Comorbidity  Goal: Blood Glucose Level Within Targeted Range  Outcome: Ongoing, Progressing     Problem: Oral Intake Inadequate (Acute Kidney Injury/Impairment)  Goal: Optimal Nutrition Intake  Outcome: Ongoing, Progressing     Problem: Fall Injury Risk  Goal: Absence of Fall and Fall-Related Injury  Outcome: Ongoing, Progressing

## 2023-07-19 NOTE — NURSING
Patient is AAOx4, cooperative, anxious, CIWA q4h (last Score 15), NSR on Airstrips, and independent.  BECCA 7/21/23, home with home health.  Last BM 7/19/23.  Patient voids by External Female Catheter, , and 1 occurrence.  Call light within reach.  Spouse visited today.

## 2023-07-19 NOTE — SUBJECTIVE & OBJECTIVE
Interval History: Patient requiring multiple ativan doses overnight. Notes continued tremulousness. Maintain on 10 valium q8h for now  Afebrile    Review of Systems  Objective:     Vital Signs (Most Recent):  Temp: 97.8 °F (36.6 °C) (07/19/23 1114)  Pulse: 80 (07/19/23 1149)  Resp: 18 (07/19/23 1339)  BP: (!) 174/74 (07/19/23 1114)  SpO2: 95 % (07/19/23 1114) Vital Signs (24h Range):  Temp:  [97.8 °F (36.6 °C)-98.7 °F (37.1 °C)] 97.8 °F (36.6 °C)  Pulse:  [70-87] 80  Resp:  [18-20] 18  SpO2:  [91 %-99 %] 95 %  BP: (141-174)/(70-98) 174/74     Weight: 81.6 kg (180 lb)  Body mass index is 29.05 kg/m².    Intake/Output Summary (Last 24 hours) at 7/19/2023 1422  Last data filed at 7/19/2023 1111  Gross per 24 hour   Intake 480 ml   Output 1930 ml   Net -1450 ml         Physical Exam  Constitutional:       General: She is not in acute distress.     Appearance: She is ill-appearing. She is not toxic-appearing.   Cardiovascular:      Rate and Rhythm: Regular rhythm. Tachycardia present.   Pulmonary:      Effort: Pulmonary effort is normal. No respiratory distress.      Breath sounds: No wheezing or rales.   Abdominal:      General: Abdomen is flat. There is no distension.      Palpations: Abdomen is soft.      Tenderness: There is no abdominal tenderness. There is no guarding.   Musculoskeletal:         General: Normal range of motion.      Cervical back: Normal range of motion and neck supple. No rigidity.      Right lower leg: No edema.      Left lower leg: No edema.   Skin:     General: Skin is warm.      Coloration: Skin is not jaundiced or pale.   Neurological:      General: No focal deficit present.      Mental Status: She is alert and oriented to person, place, and time.      Cranial Nerves: No cranial nerve deficit.      Comments: tremulous   Psychiatric:         Mood and Affect: Mood normal.         Behavior: Behavior normal.           Significant Labs: All pertinent labs within the past 24 hours have been  reviewed.    Significant Imaging: I have reviewed all pertinent imaging results/findings within the past 24 hours.

## 2023-07-19 NOTE — HOSPITAL COURSE
In the ED patient afebrile and hemodynamically stable saturating well on room air. Tachycardic, tremulous, diaphoretic despite blood alcohol 231. Patient admitted to the care of medicine for further evaluation and management. Started on valium taper, increased per psych recs. CIWA improved and patient stable for discharge with plans to go to inpatient alcohol rehab in [place]. Stable for discharge with valium taper

## 2023-07-19 NOTE — PLAN OF CARE
Pt AAOX4. VSS. Delirium withdrawal precautions exercised. Scheduled Valium and PRN Ativan given. Moderate to full relief obtained.Purwick in place. Call light in reach. Will continue to monitor.

## 2023-07-19 NOTE — ASSESSMENT & PLAN NOTE
- start valium 10mg po q8h scheduled  - banana bagx1 in ED  - CIWA q4h  - ativan prn CIWA  - daily FA/MV/thiamine  - fall and seizure precautions  - addiction Psych consult   - further management pending clinical course and future study review

## 2023-07-19 NOTE — NURSING
Patient has unresolved headache.  Oxy 5 mg given at 1339 hours.  /87, Map 128.  Diazepam given at 1333 hours.  Ativan last given at 0833 hours.  KHURRAM Bautista M.D. notified via secure chat.  Approved to give another dose of Ativan now.  Tylenol given for headache.  Spouse at bedside.

## 2023-07-20 PROBLEM — J96.01 ACUTE HYPOXEMIC RESPIRATORY FAILURE: Status: RESOLVED | Noted: 2023-06-16 | Resolved: 2023-07-20

## 2023-07-20 LAB
ALBUMIN SERPL BCP-MCNC: 3.4 G/DL (ref 3.5–5.2)
ALP SERPL-CCNC: 56 U/L (ref 55–135)
ALT SERPL W/O P-5'-P-CCNC: 17 U/L (ref 10–44)
ANION GAP SERPL CALC-SCNC: 9 MMOL/L (ref 8–16)
AST SERPL-CCNC: 26 U/L (ref 10–40)
BASOPHILS # BLD AUTO: 0.02 K/UL (ref 0–0.2)
BASOPHILS NFR BLD: 0.4 % (ref 0–1.9)
BILIRUB SERPL-MCNC: 0.3 MG/DL (ref 0.1–1)
BUN SERPL-MCNC: 9 MG/DL (ref 8–23)
CALCIUM SERPL-MCNC: 8.6 MG/DL (ref 8.7–10.5)
CHLORIDE SERPL-SCNC: 105 MMOL/L (ref 95–110)
CO2 SERPL-SCNC: 27 MMOL/L (ref 23–29)
CREAT SERPL-MCNC: 0.7 MG/DL (ref 0.5–1.4)
DIFFERENTIAL METHOD: ABNORMAL
EOSINOPHIL # BLD AUTO: 0.1 K/UL (ref 0–0.5)
EOSINOPHIL NFR BLD: 1.5 % (ref 0–8)
ERYTHROCYTE [DISTWIDTH] IN BLOOD BY AUTOMATED COUNT: 15.6 % (ref 11.5–14.5)
EST. GFR  (NO RACE VARIABLE): >60 ML/MIN/1.73 M^2
GLUCOSE SERPL-MCNC: 94 MG/DL (ref 70–110)
HCT VFR BLD AUTO: 34.3 % (ref 37–48.5)
HGB BLD-MCNC: 10.5 G/DL (ref 12–16)
IMM GRANULOCYTES # BLD AUTO: 0.02 K/UL (ref 0–0.04)
IMM GRANULOCYTES NFR BLD AUTO: 0.4 % (ref 0–0.5)
LYMPHOCYTES # BLD AUTO: 3.6 K/UL (ref 1–4.8)
LYMPHOCYTES NFR BLD: 67.9 % (ref 18–48)
MAGNESIUM SERPL-MCNC: 1.5 MG/DL (ref 1.6–2.6)
MCH RBC QN AUTO: 27.2 PG (ref 27–31)
MCHC RBC AUTO-ENTMCNC: 30.6 G/DL (ref 32–36)
MCV RBC AUTO: 89 FL (ref 82–98)
MONOCYTES # BLD AUTO: 0.3 K/UL (ref 0.3–1)
MONOCYTES NFR BLD: 5.4 % (ref 4–15)
NEUTROPHILS # BLD AUTO: 1.3 K/UL (ref 1.8–7.7)
NEUTROPHILS NFR BLD: 24.4 % (ref 38–73)
NRBC BLD-RTO: 0 /100 WBC
PHOSPHATE SERPL-MCNC: 3.8 MG/DL (ref 2.7–4.5)
PLATELET # BLD AUTO: 75 K/UL (ref 150–450)
PMV BLD AUTO: 10.1 FL (ref 9.2–12.9)
POCT GLUCOSE: 167 MG/DL (ref 70–110)
POCT GLUCOSE: 187 MG/DL (ref 70–110)
POCT GLUCOSE: 85 MG/DL (ref 70–110)
POCT GLUCOSE: 99 MG/DL (ref 70–110)
POTASSIUM SERPL-SCNC: 3.6 MMOL/L (ref 3.5–5.1)
PROT SERPL-MCNC: 5.9 G/DL (ref 6–8.4)
RBC # BLD AUTO: 3.86 M/UL (ref 4–5.4)
SODIUM SERPL-SCNC: 141 MMOL/L (ref 136–145)
WBC # BLD AUTO: 5.23 K/UL (ref 3.9–12.7)

## 2023-07-20 PROCEDURE — 20600001 HC STEP DOWN PRIVATE ROOM

## 2023-07-20 PROCEDURE — 63600175 PHARM REV CODE 636 W HCPCS: Performed by: INTERNAL MEDICINE

## 2023-07-20 PROCEDURE — 83735 ASSAY OF MAGNESIUM: CPT | Performed by: FAMILY MEDICINE

## 2023-07-20 PROCEDURE — 36415 COLL VENOUS BLD VENIPUNCTURE: CPT | Performed by: FAMILY MEDICINE

## 2023-07-20 PROCEDURE — 99232 PR SUBSEQUENT HOSPITAL CARE,LEVL II: ICD-10-PCS | Mod: ,,, | Performed by: STUDENT IN AN ORGANIZED HEALTH CARE EDUCATION/TRAINING PROGRAM

## 2023-07-20 PROCEDURE — 63600175 PHARM REV CODE 636 W HCPCS: Performed by: FAMILY MEDICINE

## 2023-07-20 PROCEDURE — 25000003 PHARM REV CODE 250: Performed by: FAMILY MEDICINE

## 2023-07-20 PROCEDURE — 85025 COMPLETE CBC W/AUTO DIFF WBC: CPT | Performed by: FAMILY MEDICINE

## 2023-07-20 PROCEDURE — 25000003 PHARM REV CODE 250: Performed by: STUDENT IN AN ORGANIZED HEALTH CARE EDUCATION/TRAINING PROGRAM

## 2023-07-20 PROCEDURE — 99232 SBSQ HOSP IP/OBS MODERATE 35: CPT | Mod: ,,, | Performed by: STUDENT IN AN ORGANIZED HEALTH CARE EDUCATION/TRAINING PROGRAM

## 2023-07-20 PROCEDURE — 80053 COMPREHEN METABOLIC PANEL: CPT | Performed by: FAMILY MEDICINE

## 2023-07-20 PROCEDURE — 84100 ASSAY OF PHOSPHORUS: CPT | Performed by: FAMILY MEDICINE

## 2023-07-20 PROCEDURE — 63600175 PHARM REV CODE 636 W HCPCS: Performed by: STUDENT IN AN ORGANIZED HEALTH CARE EDUCATION/TRAINING PROGRAM

## 2023-07-20 PROCEDURE — 25000242 PHARM REV CODE 250 ALT 637 W/ HCPCS: Performed by: FAMILY MEDICINE

## 2023-07-20 RX ORDER — DIAZEPAM 5 MG/1
10 TABLET ORAL EVERY 6 HOURS
Status: DISCONTINUED | OUTPATIENT
Start: 2023-07-20 | End: 2023-07-21 | Stop reason: HOSPADM

## 2023-07-20 RX ORDER — MAGNESIUM SULFATE 1 G/100ML
1 INJECTION INTRAVENOUS ONCE
Status: COMPLETED | OUTPATIENT
Start: 2023-07-20 | End: 2023-07-20

## 2023-07-20 RX ADMIN — DIAZEPAM 10 MG: 5 TABLET ORAL at 05:07

## 2023-07-20 RX ADMIN — THERA TABS 1 TABLET: TAB at 10:07

## 2023-07-20 RX ADMIN — Medication 100 MG: at 10:07

## 2023-07-20 RX ADMIN — DIAZEPAM 10 MG: 5 TABLET ORAL at 11:07

## 2023-07-20 RX ADMIN — LORAZEPAM 2 MG: 0.5 TABLET ORAL at 04:07

## 2023-07-20 RX ADMIN — FLUTICASONE FUROATE AND VILANTEROL TRIFENATATE 1 PUFF: 100; 25 POWDER RESPIRATORY (INHALATION) at 10:07

## 2023-07-20 RX ADMIN — LORAZEPAM 2 MG: 0.5 TABLET ORAL at 09:07

## 2023-07-20 RX ADMIN — DIAZEPAM 10 MG: 5 TABLET ORAL at 06:07

## 2023-07-20 RX ADMIN — PROCHLORPERAZINE EDISYLATE 2.5 MG: 5 INJECTION INTRAMUSCULAR; INTRAVENOUS at 02:07

## 2023-07-20 RX ADMIN — MAGNESIUM SULFATE HEPTAHYDRATE 1 G: 500 INJECTION, SOLUTION INTRAMUSCULAR; INTRAVENOUS at 10:07

## 2023-07-20 RX ADMIN — LORAZEPAM 2 MG: 0.5 TABLET ORAL at 12:07

## 2023-07-20 RX ADMIN — LEVOTHYROXINE SODIUM 50 MCG: 50 TABLET ORAL at 05:07

## 2023-07-20 RX ADMIN — Medication 6 MG: at 09:07

## 2023-07-20 RX ADMIN — ACETAMINOPHEN 1000 MG: 500 TABLET ORAL at 11:07

## 2023-07-20 RX ADMIN — OXYCODONE HYDROCHLORIDE 5 MG: 5 TABLET ORAL at 02:07

## 2023-07-20 RX ADMIN — FOLIC ACID 1 MG: 1 TABLET ORAL at 10:07

## 2023-07-20 RX ADMIN — ACETAMINOPHEN 1000 MG: 500 TABLET ORAL at 12:07

## 2023-07-20 RX ADMIN — ENOXAPARIN SODIUM 40 MG: 40 INJECTION SUBCUTANEOUS at 04:07

## 2023-07-20 RX ADMIN — LISINOPRIL 20 MG: 20 TABLET ORAL at 10:07

## 2023-07-20 RX ADMIN — ACETAMINOPHEN 1000 MG: 500 TABLET ORAL at 09:07

## 2023-07-20 NOTE — PROGRESS NOTES
[facility] ([address] Medicine  Progress Note    Patient Name: Earl Abdul  MRN: [id number]  Patient Class: IP- Inpatient   Admission Date: 7/17/2023  Length of Stay: 3 days  Attending Physician: Susana Moreno MD  Primary Care Provider: Andrew Rodriguez MD        Subjective:     Principal Problem:Alcohol withdrawal syndrome with complication        HPI:  Ms. Abdul is a 63 year old woman with PMH EtOH use disorder with history of prior seizures, multiple admissions for alcohol withdrawal, depression with suicide attempt in 2017, breast cancer, HTN, HLD, fibromyalgia, sarcoidosis, hypothyroidism, T2DM, CLL (not on treatment) and COPD who presented to [facility] with chief complaint of alcohol intoxication and withdrawal. Patient states that she is been drinking a handle of vodka a day.  Patient wishes to detox and goes to [place] for further rehab.  Patient last drink an hour prior to arrival in the ED.  Patient states that she feels very sick.  Endorses chest pain, abdominal pain, vomiting. Most recently admitted for alcohol withdrawal 06/16 to 06/22 and discharged  on valium taper. Multiple similar admissions previously as well including May and April.      Overview/Hospital Course:  In the ED patient afebrile and hemodynamically stable saturating well on room air. Tachycardic, tremulous, diaphoretic despite blood alcohol 231. Patient admitted to the care of medicine for further evaluation and management. Started on valium taper, increased per psych recs.      Interval History: CIWA 16, requiring additional PRN ativan. Valium taper increased per psych recs.   Patient reporting has inpt rehab bed.     Review of Systems   Constitutional:  Negative for fever.   Respiratory:  Negative for shortness of breath.    Cardiovascular:  Negative for chest pain.   Gastrointestinal:  Negative for abdominal pain.   Neurological:  Positive for tremors. Negative for seizures.    Psychiatric/Behavioral:  Negative for confusion.    Objective:     Vital Signs (Most Recent):  Temp: 98.3 °F (36.8 °C) (07/20/23 1619)  Pulse: 78 (07/20/23 1619)  Resp: 18 (07/20/23 1619)  BP: (!) 176/76 (07/20/23 1619)  SpO2: 95 % (07/20/23 1619) Vital Signs (24h Range):  Temp:  [97.7 °F (36.5 °C)-98.3 °F (36.8 °C)] 98.3 °F (36.8 °C)  Pulse:  [66-88] 78  Resp:  [18-20] 18  SpO2:  [94 %-98 %] 95 %  BP: (128-193)/(56-88) 176/76     Weight: 81.6 kg (180 lb)  Body mass index is 29.05 kg/m².    Intake/Output Summary (Last 24 hours) at 7/20/2023 1846  Last data filed at 7/20/2023 0800  Gross per 24 hour   Intake 355 ml   Output 400 ml   Net -45 ml         Physical Exam  Constitutional:       General: She is not in acute distress.     Appearance: She is not toxic-appearing.   Cardiovascular:      Rate and Rhythm: Normal rate and regular rhythm.   Pulmonary:      Effort: Pulmonary effort is normal. No respiratory distress.      Breath sounds: No wheezing or rales.   Abdominal:      General: Abdomen is flat. There is no distension.      Palpations: Abdomen is soft.      Tenderness: There is no abdominal tenderness. There is no guarding.   Musculoskeletal:         General: Normal range of motion.      Cervical back: Normal range of motion and neck supple. No rigidity.      Right lower leg: No edema.      Left lower leg: No edema.   Skin:     General: Skin is warm.      Coloration: Skin is not jaundiced or pale.   Neurological:      General: No focal deficit present.      Mental Status: She is alert and oriented to person, place, and time.      Cranial Nerves: No cranial nerve deficit.      Comments: tremulous   Psychiatric:         Mood and Affect: Mood normal.         Behavior: Behavior normal.           Significant Labs: All pertinent labs within the past 24 hours have been reviewed.    Significant Imaging: I have reviewed all pertinent imaging results/findings within the past 24 hours.      Assessment/Plan:      *  Alcohol withdrawal syndrome with complication  - start valium 10mg po q6h scheduled  - banana bagx1 in ED  - CIWA q4h  - ativan prn CIWA  - daily FA/MV/thiamine  - fall and seizure precautions  - addiction Psych consult   - increase valium to q6 per psych recs      CLL (chronic lymphocytic leukemia)   Established with Dr. Gonzalez. Not currently on treatment.   - Chronic thrombocytopenia noted.  Possibly associated with alcohol use.   - Outpatient Heme-Onc follow up        Hypothyroidism  - resume home synthroid      COPD (chronic obstructive pulmonary disease)  - not on oxygen at home   - continue home Breo, combivent  - prn albuterol       Malignant neoplasm of central portion of right breast in female, estrogen receptor positive  - T1b N0 stage IA breast cancer diagnosed October 2020.   - S/p bilateral mastectomy with no adjuvant therapy; received Letrozole February 2021-may 2021        Hypokalemia  - replete PRN      History of sarcoidosis  - Patient with documented history of sarcoidosis.  Not currently on treatment         Essential hypertension  - resume home lisinopril        VTE Risk Mitigation (From admission, onward)         Ordered     enoxaparin injection 40 mg  Daily         07/17/23 2231     IP VTE HIGH RISK PATIENT  Once         07/17/23 2231     Place sequential compression device  Until discontinued         07/17/23 2231                Discharge Planning   BECCA: 7/22/2023     Code Status: Full Code   Is the patient medically ready for discharge?: No    Reason for patient still in hospital (select all that apply): Patient trending condition  Discharge Plan A: Home with family                  Susana Moreno MD  Department of Hospital Medicine   [facility] - Intensive Care ([place]-14)

## 2023-07-20 NOTE — PROGRESS NOTES
FOLLOW UP VISIT: ADDICTION PSYCHIATRY CONSULTATION SERVICE      ASSESSMENT AND PLAN:     DIAGNOSES & PROBLEMS:  Alcohol use disorder, severe, dependence    In Summary:  Patient is a 62 y/o F with past psych history of alcohol use disorder, severe, dependence, major depressive disorder, generalized anxiety disorder, and PMH of breast cancer, fibromyalgia, sarcoidosis, hypothyroidism, T2DM, and recent diagnosis of CLL who is admitted for alcohol withdrawal. Patient is interested in rehab services after discharge.     Plan:  -Consider increasing valium taper given multiple breakthrough PRN Ativan doses as appropriate; defer to primary team  Please continue to monitor LFTs with daily CMP  Can continue Ativan PRN with CIWA protocol and vital signs q4h  - patient requested a Psychology consult while inpatient; could benefit from services  - recommend patient enroll in addiction rehab program after discharge and medical stabilization  -Will provide resources for patient and continue discuss rehabilitation options regarding post discharge disposition as well as outpatient psychiatric treatment  - counseled on full abstinence from alcohol and substances of abuse (illicit and prescription)  - full engagement in 12 step (or equivalent) recovery program(s), including meeting attendance and acquisition/maintenance of sponsor      INTERVAL HISTORY AND ROS:     Patient resting comfortably in bed prior to interview. Patient is alert and oriented although expresses mild fatigue and confusion 2/2 having recently woken. States that she is doing much better today than yesterday regarding her withdrawal symptoms. Continues to expresses interest in attending rehab in [place], despite living in [place]. Pt is fearful of how dependent she is on alcohol in her daily life. Denies SI/HI/AVH. Reports interest in speaking to psychiatrist as outpatient, will provide resources for local walk in clinics.     CURRENT PSYCHOTROPIC  "REGIMEN:  Diazepam 10 mg q6 hours   Lorazepam 2 mg q4 hours PRN      PERTINENT PAST HISTORY AND CHART REVIEW:     Pertinent past history and chart reviewed.       EXAMINATION:     BP (!) 130/56 (BP Location: Left arm, Patient Position: Lying)   Pulse 79   Temp 98 °F (36.7 °C) (Oral)   Resp 18   Wt 81.6 kg (180 lb)   SpO2 95%   Breastfeeding No   BMI 29.05 kg/m²     MENTAL STATUS EXAMINATION:  General Appearance & Behavior: **  unremarkable, appears stated age, adequately groomed, dressed in hospital garb, lying in bed  Involuntary Movements and Motor Activity: **  no abnormal involuntary movements noted  Speech & Language: **  normal rate, rhythm, volume, tone, and pitch  Mood: "Fine, just tired"  Affect: **  reactive, mood congruent  Thought Process & Associations: **  linear and goal-directed, with no loosening of associations  Thought Content & Perceptions: **  no suicidal or homicidal ideation, no evidence of psychosis  Sensorium and Cognition: **  grossly intact, no significant deficits noted  Insight & Judgment: **  insight and judgment intact      RISK MANAGEMENT:     I[]I Y  I[x]I N  I[]I U  I[]I A  Suicidal Ideation/Behavior: **   I[]I Y  I[x]I N  I[]I U  I[]I A  Homicidal Ideation/Behavior: **  I[]I Y  I[x]I N  I[]I U  I[]I A  Violence: **  I[]I Y  I[x]I N  I[]I U  I[]I A  Self-Injurious Behavior: **    The patient is deemed to be a historian of unknown reliability and accuracy.    I[]I Y  I[x]I N  I[]I U  I[]I A  I[]I N/A  Minimization of Risk Parameters Suspected/Evident: **  I[]I Y  I[x]I N  I[]I U  I[]I A  I[]I N/A  Exaggeration of Risk Parameters Suspected/Evident: **      [] Y  [x] N  Danger to Self:   [] Y  [x] N  Danger to Others:   [] Y  [x] N  Grave Disability:       In cases of emergency, daily coverage provided by Acute/ER Psych MD, NP, PA, or SW, with contact numbers located in [facility] On Call Schedule.    Bright Joseph MD  Psychiatry, PGY-II  Department of " Psychiatry  [facility]        KEY:     I[]I Y = Yes / Present / Endorses  I[]I N = No / Absent / Denies  I[]I U = Unknown / Unable to Assess / Unwilling to Participate  I[]I A = Ambiguity Exists / Accuracy Uncertain  I[]I D = Denial or Minimization is Suspected/Evident  I[]I N/A = Non-Applicable    CHART REVIEW:     Available documentation has been reviewed, and pertinent elements of the chart - including previous psychiatric evaluations - have been incorporated into this evaluation where appropriate.    ADVICE AND COUNSELING:     [x] In cases of emergencies (e.g. SI/HI resulting in danger to self or others, functioning deteriorates to the level of grave disability), call 911 or [phone], or present to the emergency department for immediate assistance.  [x] Patient should not operate a motor vehicle or heavy machinery if effects of medications or underlying symptoms/condition impair the ability to safely do so.    Alcohol, Tobacco, and Drug Counseling, as well as resources, has been provided, as warranted.     Shared medical decision making and informed consent are the hallmark and bedrock of good clinical care, and as such have been employed and obtained, respectively, to the degree possible.      Risk Mitigation Strategies, Harm Reduction Techniques, and Safety Netting are important interventions that can reduce acute and chronic risk, and as such have been employed to the degree possible.    Prescription Drug Management entails the review, recommendation, or consideration without recommendation of medications, and as such was employed during the encounter.    Additional Psychoeducation has been provided, as warranted.    Discussed, to the extent possible, diagnosis, risks and benefits of proposed treatment vs alternative treatments vs no treatment, potential side effects of these treatments and the inherent unpredictability of treatment. The patient expresses understanding of the above and displays the capacity to  agree with this treatment given said understanding. Patient also agrees that, currently, the benefits outweigh the risks and consents to treatment at this time.     Written material has been provided to supplement, augment, and reinforce any discussions and interventions, via the AVS or other pre-printed handouts, as warranted.      DIAGNOSTIC TESTING:     The chart was reviewed for recent diagnostic procedures and investigations, and pertinent results are noted below.    Wt Readings from Last 2 Encounters:   07/17/23 81.6 kg (180 lb)   06/27/23 75.9 kg (167 lb 5.3 oz)     BP Readings from Last 1 Encounters:   07/20/23 (!) 130/56     Pulse Readings from Last 1 Encounters:   07/20/23 79        Blood Counts, Electrolytes & Glucose: (i.e. WBC, ANC, Hemoglobin, Hematocrit, MCV, Platelets)  Lab Results   Component Value Date    WBC 5.23 07/20/2023    GRAN 1.3 (L) 07/20/2023    GRAN 24.4 (L) 07/20/2023    HGB 10.5 (L) 07/20/2023    HCT 34.3 (L) 07/20/2023    MCV 89 07/20/2023    PLT 75 (L) 07/20/2023     07/20/2023    K 3.6 07/20/2023    CALCIUM 8.6 (L) 07/20/2023    PHOS 3.8 07/20/2023    MG 1.5 (L) 07/20/2023    CO2 27 07/20/2023    ANIONGAP 9 07/20/2023    GLU 94 07/20/2023    HGBA1C 4.7 06/16/2023       Renal, Liver, Pancreas, Thyroid, Parathyroid, Prolactin, CPK, Lipids & Vitamin Levels: (i.e. Cr, BUN, Anion Gap, GFR, Urine Specific Gravity, Urine Protein, Microalburnin, AST, ALT, GGT, Alk Phos,Total Bili, Total Protein, Albumin, Ammonia, INR, Amylase, Lipase, TSH, Total T3, Total T4, Free T4 PTH, Prolactin, CPK, Cholesterol, Triglycerides, LDH, HDL, Vitamin B12, Folate, Vitamin D)  Lab Results   Component Value Date    CREATININE 0.7 07/20/2023    BUN 9 07/20/2023    EGFRNORACEVR >60.0 07/20/2023    SPECGRAV 1.020 07/18/2023    PROTEINUA Trace (A) 07/18/2023    AST 26 07/20/2023    ALT 17 07/20/2023     (H) 04/01/2011    ALKPHOS 56 07/20/2023    BILITOT 0.3 07/20/2023    LABPROT 10.4 06/16/2023     ALBUMIN 3.4 (L) 07/20/2023    AMMONIA 29 04/06/2023    INR 1.0 06/16/2023    AMYLASE 19 (L) 10/14/2014    LIPASE 21 07/17/2023    TSH 1.283 06/16/2023    FREET4 0.90 04/18/2022    PTH 45 10/15/2014    CPK 25 04/01/2023    CHOL 184 04/06/2023    TRIG 161 (H) 04/06/2023    LDLCALC 107.8 04/06/2023    HDL 44 04/06/2023    [id number] 368 06/06/2023    FOLATE 13.3 04/06/2023    THIAMINEBLOO 136 (H) 06/06/2023    [id number] 24 (L) 10/15/2014       Infection Diseases, Pregnancy Screenings & Drug Levels: (i.e. Hepatitis Panel, HIV, Syphilis, Urine & Blood Pregnancy Screens, beta hCG, Lithium, Valproic Acid, Carbamazepine, Lamotrigine, Phenytoin, Phenobarbital, Clozapine, Norclozapine, Clozapine + Norclozapine)   Lab Results   Component Value Date    HEPAIGM Negative 07/26/2017    HEPBIGM Positive (A) 07/26/2017    HEPCAB Non-reactive 03/10/2023    LAM70GCQD Non-reactive 03/10/2023    HCGQUANT <2.0 05/09/2006    LITHIUM <0.1 (L) 09/29/2016       Addiction: (i.e. Urine Toxicology, Blood Alcohol, PETH, EtG, EtS, CDT, Buprenorphine, Norbuprenorphine)  Lab Results   Component Value Date    PCDSOALCOHOL 14 (A) 05/15/2023    PCDSOBENZOD Presumptive Positive (A) 07/18/2023    BARBITURATES Negative 07/18/2023    PCDSCOMETHA Negative 07/18/2023    OPIATESCREEN Negative 07/18/2023    COCAINEMETAB Negative 07/18/2023    AMPHETAMINES Negative 07/18/2023    MARIJUANATHC Negative 07/18/2023    PCDSOPHENCYN Negative 07/18/2023    PCDSUOXYCOD Presumptive Positive (A) 07/18/2023    [id number] 1099 06/16/2023    ALCOHOLMEDIC 231 (H) 07/17/2023       Results for orders placed or performed during the hospital encounter of 07/17/23   EKG 12-lead    Collection Time: 07/17/23  7:04 PM    Narrative    Test Reason : R00.0,    Vent. Rate : 104 BPM     Atrial Rate : 104 BPM     P-R Int : 140 ms          QRS Dur : 088 ms      QT Int : 346 ms       P-R-T Axes : 076 069 075 degrees     QTc Int : 454 ms    Sinus tachycardia  Otherwise normal ECG  When  compared with ECG of 16-JUN-2023 17:37,  The axis Shifted right  T wave inversion no longer evident in Inferior leads  Confirmed by Sg STEPHENSON MD ([id number]) on 7/17/2023 8:10:14 PM    Referred By: [email or web address]   SELF           Confirmed By:Sg STEPHENSON MD       Results for orders placed or performed during the hospital encounter of 07/17/23   CT Head Without Contrast    Narrative    EXAMINATION:  CT HEAD WITHOUT CONTRAST    CLINICAL HISTORY:  Headache, new or worsening (Age >= 50y);    TECHNIQUE:  Low dose axial CT images obtained throughout the head without intravenous contrast. Sagittal and coronal reconstructions were performed.    COMPARISON:  06/16/2023    FINDINGS:  Intracranial compartment:    Ventricles and sulci are normal in size for age without evidence of hydrocephalus. No extra-axial blood or fluid collections.    The brain parenchyma appears normal. No parenchymal mass, hemorrhage, edema or major vascular distribution infarct.    Skull/extracranial contents (limited evaluation): No fracture. Mastoid air cells and paranasal sinuses are essentially clear.      Impression    No acute abnormality.      Electronically signed by: Mynor Roldan  Date:    07/17/2023  Time:    21:01   Results for orders placed or performed during the hospital encounter of 06/10/22   MRI Brain Without Contrast    Narrative    EXAMINATION:  MRI BRAIN WITHOUT CONTRAST    CLINICAL HISTORY:  hx of PRES;    TECHNIQUE:  Multiplanar multisequence MR imaging of the brain was performed without intravenous contrast.    COMPARISON:  Head CT 06/10/2022, brain MRI 10/04/2017, 07/21/2017    FINDINGS:  Intracranial Compartment:    Ventricles are normal in size for age without evidence of hydrocephalus.    Ill-defined focus T2-FLAIR signal hyperintensity in the right periatrial white matter.  Few additional scattered punctate foci elsewhere within the supratentorial white matter.  These appear similar to the prior study of 10/04/2017.  No definite new  focal lesions.  No diffusion restriction to indicate an acute infarction.  No remote major vascular distribution infarct.  No recent or remote hemorrhage.  No mass effect or midline shift.    No extra-axial blood or fluid collections.    Normal vascular flow voids are preserved.    Skull/Extracranial Contents (limited evaluation):    Bone marrow signal intensity is normal.      Impression    No evidence of acute intracranial pathology.      Electronically signed by: Miguel Malagon MD  Date:    06/10/2022  Time:    09:45

## 2023-07-20 NOTE — PLAN OF CARE
[facility] ([facility])  Discharge Reassessment    Primary Care Provider: Andrew Rodriguez MD    Expected Discharge Date: 7/21/2023    Reassessment (most recent)       Discharge Reassessment - 07/20/23 1311          Discharge Reassessment    Assessment Type Discharge Planning Reassessment (P)      Did the patient's condition or plan change since previous assessment? Yes (P)      Communicated BECCA with patient/caregiver Yes (P)      Discharge Plan A Home with family (P)      Discharge Plan B Home with family (P)                    Patient is not stable to dc at this time. Patient expected to dc home with family when stable to dc. SW attempted to meet with patient and provide resource for alcohol rehab and she states that she has already chosen a facility.         Paz Guerra LMSW  [facility]   [phone]

## 2023-07-20 NOTE — ASSESSMENT & PLAN NOTE
- resume home synthroid     Attempted to complete MRI screening. Pt not accurate historian. Able to answer some questions using chart history.

## 2023-07-20 NOTE — NURSING
Patient had young male visitor and asked Bedside to come back at a later time to administer morning medications.  ALEXEY Moreno M.D. notified via secure chat.

## 2023-07-20 NOTE — PLAN OF CARE
Problem: Violence Risk or Actual  Goal: Anger and Impulse Control  Outcome: Ongoing, Progressing     Problem: Adult Inpatient Plan of Care  Goal: Plan of Care Review  Outcome: Ongoing, Progressing  Goal: Patient-Specific Goal (Individualized)  Outcome: Ongoing, Progressing  Goal: Absence of Hospital-Acquired Illness or Injury  Outcome: Ongoing, Progressing  Goal: Optimal Comfort and Wellbeing  Outcome: Ongoing, Progressing     Problem: Fall Injury Risk  Goal: Absence of Fall and Fall-Related Injury  Outcome: Ongoing, Progressing

## 2023-07-20 NOTE — CONSULTS
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 Blood Alcohol, PETH, EtG, EtS, CDT, Buprenorphine, Norbuprenorphine)  Lab Results   Component Value Date    PCDSOALCOHOL 14 (A) 05/15/2023    PCDSOBENZOD Presumptive Positive (A) 07/18/2023    BARBITURATES Negative 07/18/2023    PCDSCOMETHA Negative 07/18/2023    OPIATESCREEN Negative 07/18/2023    COCAINEMETAB Negative 07/18/2023    AMPHETAMINES Negative 07/18/2023    MARIJUANATHC Negative 07/18/2023    PCDSOPHENCYN Negative 07/18/2023    PCDSUOXYCOD Presumptive Positive (A) 07/18/2023    [id number] 1099 06/16/2023    ALCOHOLMEDIC 231 (H) 07/17/2023       Results for orders placed or performed during the hospital encounter of 07/17/23   EKG 12-lead    Collection Time: 07/17/23  7:04 PM    Narrative    Test Reason : R00.0,    Vent. Rate : 104 BPM     Atrial Rate : 104 BPM     P-R Int : 140 ms          QRS Dur : 088 ms      QT Int : 346 ms       P-R-T Axes : 076 069 075 degrees     QTc Int : 454 ms    Sinus tachycardia  Otherwise normal ECG  When compared with ECG of 16-JUN-2023  17:37,  The axis Shifted right  T wave inversion no longer evident in Inferior leads  Confirmed by Sg STEPHENSON MD ([id number]) on 7/17/2023 8:10:14 PM    Referred By: ISIS   SELF           Confirmed By:Sg STEPHENSON MD       Results for orders placed or performed during the hospital encounter of 07/17/23   CT Head Without Contrast    Narrative    EXAMINATION:  CT HEAD WITHOUT CONTRAST    CLINICAL HISTORY:  Headache, new or worsening (Age >= 50y);    TECHNIQUE:  Low dose axial CT images obtained throughout the head without intravenous contrast. Sagittal and coronal reconstructions were performed.    COMPARISON:  06/16/2023    FINDINGS:  Intracranial compartment:    Ventricles and sulci are normal in size for age without evidence of hydrocephalus. No extra-axial blood or fluid collections.    The brain parenchyma appears normal. No parenchymal mass, hemorrhage, edema or major vascular distribution infarct.    Skull/extracranial contents (limited evaluation): No fracture. Mastoid air cells and paranasal sinuses are essentially clear.      Impression    No acute abnormality.      Electronically signed by: Mynor Roldan  Date:    07/17/2023  Time:    21:01   Results for orders placed or performed during the hospital encounter of 06/10/22   MRI Brain Without Contrast    Narrative    EXAMINATION:  MRI BRAIN WITHOUT CONTRAST    CLINICAL HISTORY:  hx of PRES;    TECHNIQUE:  Multiplanar multisequence MR imaging of the brain was performed without intravenous contrast.    COMPARISON:  Head CT 06/10/2022, brain MRI 10/04/2017, 07/21/2017    FINDINGS:  Intracranial Compartment:    Ventricles are normal in size for age without evidence of hydrocephalus.    Ill-defined focus T2-FLAIR signal hyperintensity in the right periatrial white matter.  Few additional scattered punctate foci elsewhere within the supratentorial white matter.  These appear similar to the prior study of 10/04/2017.  No definite new focal lesions.  No diffusion  restriction to indicate an acute infarction.  No remote major vascular distribution infarct.  No recent or remote hemorrhage.  No mass effect or midline shift.    No extra-axial blood or fluid collections.    Normal vascular flow voids are preserved.    Skull/Extracranial Contents (limited evaluation):    Bone marrow signal intensity is normal.      Impression    No evidence of acute intracranial pathology.      Electronically signed by: Miguel aMlagon MD  Date:    06/10/2022  Time:    09:45       CONSULTATION:     A diagnostic psychiatric evaluation was performed and responsiveness to treatment was assessed.  The patient demonstrates adequate ability/capacity to respond to treatment.    Inpatient consult to Psychiatry  Consult performed by: Roxann Salazar DO  Consult ordered by: Arturo Bautista MD        Case discussed with Dr. Amador.

## 2023-07-20 NOTE — SUBJECTIVE & OBJECTIVE
Interval History: CIWA 16, requiring additional PRN ativan. Valium taper increased per psych recs.   Patient reporting has inpt rehab bed.     Review of Systems   Constitutional:  Negative for fever.   Respiratory:  Negative for shortness of breath.    Cardiovascular:  Negative for chest pain.   Gastrointestinal:  Negative for abdominal pain.   Neurological:  Positive for tremors. Negative for seizures.   Psychiatric/Behavioral:  Negative for confusion.    Objective:     Vital Signs (Most Recent):  Temp: 98.3 °F (36.8 °C) (07/20/23 1619)  Pulse: 78 (07/20/23 1619)  Resp: 18 (07/20/23 1619)  BP: (!) 176/76 (07/20/23 1619)  SpO2: 95 % (07/20/23 1619) Vital Signs (24h Range):  Temp:  [97.7 °F (36.5 °C)-98.3 °F (36.8 °C)] 98.3 °F (36.8 °C)  Pulse:  [66-88] 78  Resp:  [18-20] 18  SpO2:  [94 %-98 %] 95 %  BP: (128-193)/(56-88) 176/76     Weight: 81.6 kg (180 lb)  Body mass index is 29.05 kg/m².    Intake/Output Summary (Last 24 hours) at 7/20/2023 1846  Last data filed at 7/20/2023 0800  Gross per 24 hour   Intake 355 ml   Output 400 ml   Net -45 ml         Physical Exam  Constitutional:       General: She is not in acute distress.     Appearance: She is not toxic-appearing.   Cardiovascular:      Rate and Rhythm: Normal rate and regular rhythm.   Pulmonary:      Effort: Pulmonary effort is normal. No respiratory distress.      Breath sounds: No wheezing or rales.   Abdominal:      General: Abdomen is flat. There is no distension.      Palpations: Abdomen is soft.      Tenderness: There is no abdominal tenderness. There is no guarding.   Musculoskeletal:         General: Normal range of motion.      Cervical back: Normal range of motion and neck supple. No rigidity.      Right lower leg: No edema.      Left lower leg: No edema.   Skin:     General: Skin is warm.      Coloration: Skin is not jaundiced or pale.   Neurological:      General: No focal deficit present.      Mental Status: She is alert and oriented to person,  place, and time.      Cranial Nerves: No cranial nerve deficit.      Comments: tremulous   Psychiatric:         Mood and Affect: Mood normal.         Behavior: Behavior normal.           Significant Labs: All pertinent labs within the past 24 hours have been reviewed.    Significant Imaging: I have reviewed all pertinent imaging results/findings within the past 24 hours.

## 2023-07-20 NOTE — NURSING
[place], Charge RN, discussed policy against smoking and vaping in hospital.  Patient observed with small green device, but denied having access to Vape Pen.

## 2023-07-20 NOTE — NURSING
Patient is AAOx4, cooperative, anxious, CIWA q4h, NSR on Airstrips, and independent.  BECCA 7/21/23, home with home health.  Last BM 7/19/23.  Patient voids in toilet, 4 occurrences.  Vape pen confiscated from Patient from night team.  Call light within reach. Spouse visited today. Patient educated to avoid use of Vape Pens while inpatient.

## 2023-07-20 NOTE — ASSESSMENT & PLAN NOTE
- start valium 10mg po q6h scheduled  - banana bagx1 in ED  - CIWA q4h  - ativan prn CIWA  - daily FA/MV/thiamine  - fall and seizure precautions  - addiction Psych consult   - increase valium to q6 per psych recs

## 2023-07-20 NOTE — PLAN OF CARE
Pt AAOX4. VSS. Delirium withdrawal precautions exercised. Patient had increased anxiScheduled Valium and PRN Ativan given. Moderate to full relief obtained.  Call light in reach. Will continue to monitor

## 2023-07-20 NOTE — DISCHARGE INSTRUCTIONS
REFERRAL RECOMMENDATIONS FOR ALCOHOL USE DISORDER      12 STEP PROGRAMS (and similar):     Alcoholics Anonymous (local)  [x] [phone]  [x] www.[place].org for schedules for in-person and online meetings  [x] There are AA meetings throughout the day all over town  [x] AA costs nothing to attend; they pass a basket for donations but this is not required    Alcoholics Anonymous Online Intergroup (national)  [x] www.aa-intergroup.org  [x] Good resource for large, nation-wide meetings  [x] Can also attend smaller, local meetings in other cities  [x] Countless meetings all day and all night  [x] AA costs nothing to attend; they pass a basket for donations but this is not required    F[facility] Sober - 24/7 zoom meetings for women and coed - sign on anytime, anywhere!  https://"[employer]sobSocial Game Universe/54-1-voafsyxe/    Online Intergroup of AA - 121 Open AA [place] Meeting - 24/7 zoom meetings  https://aa-intergroup.org/meetings/    LOOKING FOR AN ALTERNATIVE TO 12 STEP PROGRAMS - check out:  [facility] Recovery: https://www.smartrecovery.org/about-us  [facility]: https://recoverydharma.org      DETOX UNITS (USUALLY 5-7 DAYS):     [facility]: [address], [place]  [phone], call first to ensure bed availability    [facility]: [address] [place]  [phone], Option 1, call first to ensure bed availability    [place] [facility]: [facility] [address], [place]  [phone] (intake by appointment only)    [facility]: [address], [place]  [phone]      INTENSIVE OUTPATIENT PROGRAMS:     [facility] (formerly known as the [facility])  [x] [phone], Option 2  [x] [address] 4th Floor, [place] [address]  [x] https://www.[facility]sner.org/services/[facility]-recovery-program  [x] [facility] delivers comprehensive and collaborative treatment for alcohol and substance use disorders.  Excellent program for working professionals or  anyone else seeking recovery.  [x] Requires insurance approval prior to starting program, call number above for more information.  [x] Intensive Outpatient Rehabilitation Program - M-F 9am-3pm - daily groups with psychologists and social workers, sessions with MDs 3x per week   [x] Ambulatory detox and dual diagnosis available    [facility] Intensive Outpatient Program  [x] [phone]  [x] [address] [facility] (the clinic not on [facility] main campus)  [x] Call number above for more info and to check insurance requirements    [facility]  [address] [place], [place] [address] [phone]    [facility]:  [address] [facility] [address], [address] [place], [place] [address]?, [phone]  [address] [facility]?, [place], [place] [address]?, [phone]    RESIDENTIAL REHABS (USUALLY 28 DAYS):     [facility]: [address], [phone]    [facility]: [address] , [facility]  [phone] (intake by appointment only)    [facility] (men only) [address] [facility], [phone]    [facility] (Female only) [address] [facility], [phone]    [facility]: [address], [facility], men's program [phone], women's program [phone]    [facility]: [address], [facility], [phone]    [facility]: [address], [place], [phone]    [facility]: Men only, [phone], [address] [place] [facility]: [address], [place], [place], [phone]    [facility]: [facility] [place], [place],  [phone]  New Location: [address] [place] [address] [address], [place] [address], [phone]    [facility]:   ?[address]?[place], [place] [address]?[phone]    [facility]: [address], [place] [address], [phone]    [place]: [place], [phone]     [facility]: [place], [place], [phone]    [address] [facility]: [address]  RADHA Oakes, [phone]    [facility]: [place], [place], [phone]    [facility]: [place], [place], [phone]    [facility] [place]: [address], [place] [address], [phone]    COMMUNITY ADDICTION CLINICS:     [facility]: [address] [facility], [address] [place], [phone] -or- [address] [place] [address]    [facility] [place]: [address] [facility], [place]  [address]     [facility]: Clinics [phone]; Crisis [phone]    [facility]: [address] [place] [address]    [facility] Center: [address] [place] [address]    [facility]: [address] [place] [address],    [place] [facility]: 2nd Floor [address] [place] [address]    [facility]: [address] V[facility]quezL[facility] [place] [address]    [facility], [address] [place] [address]    [facility]: [address] [place] [address], [place] [phone]  (serves youth 16-23 years old)    [facility] Center: [facility]/[place] [phone]    [facility]: [address] [facility], [place] [phone]    [facility]: [address], [place] [phone]    [facility]: [address] [facility] [place], [address], [phone]     [facility]: [address] [facility] [facility], [phone], [phone]    RESOURCES IN OTHER [facility]:     [facility]: [address] [place], [phone], [phone]    [facility]: [address] [facility] [address], [address], [phone]    [facility], [address] [place] [address], [phone]    [facility]: [address], [place], [phone]    [facility], [address] [facility], [phone] ([facility])    [facility], [address] [facility], [phone] ([facility])    [facility], [address], [place], [phone] ([place], [place], and [facility])    [facility], [address], [place], [phone] ([facility])    [facility] Hotline [phone], [phone]    [facility], [address], [facility], [address] [facility], [address], [facility], [address], [facility], [address] [facility] [facility], [address], [place]    [facility], [address], [place] [phone]    [facility], [address] [facility] [phone]    [facility] Recovery, [address] [facility], [phone]    [facility] for Addiction Recovery, [address] [place] [address], [phone]    MENTAL HEALTH/ADDICTIVE DISORDERS:     AA ([phone]), NA ([phone])   National Suicide Prevention Lifeline- Call 1-615.924.9328 Available 24 hours everyday  [facility] [phone]; Crisis Line [phone] - Call for options A-F:  Intensive Outpatient Treatment/ Day programs   [facility], please contact   [facility], please contact [phone] or [phone] to speak with an admissions counselor.  Behavioral Health Group (Methadone Maintenance)   [facility] [place] [address], [phone]  [address] [place] [address] [phone]  [place], [address] [place] [address], [phone]  [facility] [phone]  [facility]  Recovery Center please contact [phone]  [facility], [address], 4th [place] [address] Phone: [phone]   [facility]  [place] Office: [address] [place] [address], [phone]  [place] Office: [address] [facility] [facility], [address], [place] [address], [phone]  [place] Office: [address] [place] [address] [phone]    Outpatient Substance Abuse Treatment   Behavioral Health Group (Methadone Maintenance)   [address] [place] [address], [phone]  [address], [place] [address] [phone]  [facility], [address] [place] [place] [address], [phone]  [facility]  [place] Office: [address] [place] [address], [phone]  [place] Office: [address] [facility], [address], [place] [address], [phone]  [place] Office: [address] [place] [address] [phone]  [facility], [address] [place] [address] [phone]   [facility] Recovery, [address] [facility], [address], [phone]  [facility] Recover, [address] [place], [place] [phone]    Residential Substance Abuse Treatment   [facility] [address] [facility], [phone] or [phone]  [facility], [address] [facility], [phone]  [facility] (men only) [address] [place] [address], [phone]  [facility] at the [facility] (women only) [address] [place] [address] [phone]  [facility], [address] [place] [address] [phone]  [facility] (women only), intakes at [address] [facility], [phone]  [facility] (7-day program, $100, [address] [place], [phone], [phone], [phone])  [facility] (Men only, [phone]), [address], [place] (Vets*/Non-Vets)  [facility] (Men only, $400/month program fee) [address] [facility] [address], [phone]  [facility]  (Women over age 39 only), [address] [facility], [phone]    Out of Area:    [place], [address], [place], [place] [phone])  [facility] Recovery Program (men only), [address] [facility] [place] [address], [phone]  [facility], [address], [place] ([phone])  [place], [address] [place] [address] Dr. Flores, [place] ([phone])  [facility], [address] [facility], [phone]  [facility] (Women only, has to have mental illness, can be homeless or substance abuser), [phone]      IN CASE OF SUICIDAL THINKING, call the National Suicide Hotline Number: 988    988 Suicide & Crisis Lifeline: 988 , 5-055-239-TALK (3021)  Provides 24/7, free and confidential support for people in distress, prevention and crisis resources for you or your loved ones, and best practices for professionals.    Call, text or chat.  https://988Orbiterline.org

## 2023-07-21 VITALS
DIASTOLIC BLOOD PRESSURE: 72 MMHG | RESPIRATION RATE: 20 BRPM | SYSTOLIC BLOOD PRESSURE: 150 MMHG | TEMPERATURE: 99 F | BODY MASS INDEX: 29.05 KG/M2 | HEART RATE: 111 BPM | WEIGHT: 180 LBS | OXYGEN SATURATION: 95 %

## 2023-07-21 LAB
ALBUMIN SERPL BCP-MCNC: 3.9 G/DL (ref 3.5–5.2)
ALP SERPL-CCNC: 69 U/L (ref 55–135)
ALT SERPL W/O P-5'-P-CCNC: 28 U/L (ref 10–44)
ANION GAP SERPL CALC-SCNC: 12 MMOL/L (ref 8–16)
ANISOCYTOSIS BLD QL SMEAR: SLIGHT
AST SERPL-CCNC: 42 U/L (ref 10–40)
BASOPHILS # BLD AUTO: 0.01 K/UL (ref 0–0.2)
BASOPHILS NFR BLD: 0.2 % (ref 0–1.9)
BILIRUB SERPL-MCNC: 0.3 MG/DL (ref 0.1–1)
BUN SERPL-MCNC: 8 MG/DL (ref 8–23)
CALCIUM SERPL-MCNC: 9.2 MG/DL (ref 8.7–10.5)
CHLORIDE SERPL-SCNC: 105 MMOL/L (ref 95–110)
CO2 SERPL-SCNC: 26 MMOL/L (ref 23–29)
CREAT SERPL-MCNC: 0.7 MG/DL (ref 0.5–1.4)
DIFFERENTIAL METHOD: ABNORMAL
EOSINOPHIL # BLD AUTO: 0.1 K/UL (ref 0–0.5)
EOSINOPHIL NFR BLD: 1.8 % (ref 0–8)
ERYTHROCYTE [DISTWIDTH] IN BLOOD BY AUTOMATED COUNT: 16.3 % (ref 11.5–14.5)
EST. GFR  (NO RACE VARIABLE): >60 ML/MIN/1.73 M^2
FENTANYL UR CFM-MCNC: NOT DETECTED NG/ML
GLUCOSE SERPL-MCNC: 119 MG/DL (ref 70–110)
HCT VFR BLD AUTO: 32.5 % (ref 37–48.5)
HGB BLD-MCNC: 10.6 G/DL (ref 12–16)
HYPOCHROMIA BLD QL SMEAR: ABNORMAL
IMM GRANULOCYTES # BLD AUTO: 0.01 K/UL (ref 0–0.04)
IMM GRANULOCYTES NFR BLD AUTO: 0.2 % (ref 0–0.5)
LYMPHOCYTES # BLD AUTO: 2.6 K/UL (ref 1–4.8)
LYMPHOCYTES NFR BLD: 51.6 % (ref 18–48)
MAGNESIUM SERPL-MCNC: 1.8 MG/DL (ref 1.6–2.6)
MCH RBC QN AUTO: 28.2 PG (ref 27–31)
MCHC RBC AUTO-ENTMCNC: 32.6 G/DL (ref 32–36)
MCV RBC AUTO: 86 FL (ref 82–98)
MONOCYTES # BLD AUTO: 0.3 K/UL (ref 0.3–1)
MONOCYTES NFR BLD: 6 % (ref 4–15)
NEUTROPHILS # BLD AUTO: 2 K/UL (ref 1.8–7.7)
NEUTROPHILS NFR BLD: 40.2 % (ref 38–73)
NORFENTANYL UR CFM-MCNC: NOT DETECTED NG/ML
NRBC BLD-RTO: 0 /100 WBC
OVALOCYTES BLD QL SMEAR: ABNORMAL
PHOSPHATE SERPL-MCNC: 3.7 MG/DL (ref 2.7–4.5)
PLATELET # BLD AUTO: 84 K/UL (ref 150–450)
PLATELET BLD QL SMEAR: ABNORMAL
PMV BLD AUTO: 10.1 FL (ref 9.2–12.9)
POCT GLUCOSE: 135 MG/DL (ref 70–110)
POCT GLUCOSE: 92 MG/DL (ref 70–110)
POIKILOCYTOSIS BLD QL SMEAR: SLIGHT
POLYCHROMASIA BLD QL SMEAR: ABNORMAL
POTASSIUM SERPL-SCNC: 4.1 MMOL/L (ref 3.5–5.1)
PROT SERPL-MCNC: 6.9 G/DL (ref 6–8.4)
RBC # BLD AUTO: 3.76 M/UL (ref 4–5.4)
SODIUM SERPL-SCNC: 143 MMOL/L (ref 136–145)
TOXICOLOGIST REVIEW: NORMAL
WBC # BLD AUTO: 5 K/UL (ref 3.9–12.7)

## 2023-07-21 PROCEDURE — 99239 HOSP IP/OBS DSCHRG MGMT >30: CPT | Mod: ,,, | Performed by: STUDENT IN AN ORGANIZED HEALTH CARE EDUCATION/TRAINING PROGRAM

## 2023-07-21 PROCEDURE — 94640 AIRWAY INHALATION TREATMENT: CPT

## 2023-07-21 PROCEDURE — 99239 PR HOSPITAL DISCHARGE DAY,>30 MIN: ICD-10-PCS | Mod: ,,, | Performed by: STUDENT IN AN ORGANIZED HEALTH CARE EDUCATION/TRAINING PROGRAM

## 2023-07-21 PROCEDURE — 94761 N-INVAS EAR/PLS OXIMETRY MLT: CPT

## 2023-07-21 PROCEDURE — 85025 COMPLETE CBC W/AUTO DIFF WBC: CPT | Performed by: STUDENT IN AN ORGANIZED HEALTH CARE EDUCATION/TRAINING PROGRAM

## 2023-07-21 PROCEDURE — 25000003 PHARM REV CODE 250: Performed by: FAMILY MEDICINE

## 2023-07-21 PROCEDURE — 83735 ASSAY OF MAGNESIUM: CPT | Performed by: STUDENT IN AN ORGANIZED HEALTH CARE EDUCATION/TRAINING PROGRAM

## 2023-07-21 PROCEDURE — 25000003 PHARM REV CODE 250: Performed by: STUDENT IN AN ORGANIZED HEALTH CARE EDUCATION/TRAINING PROGRAM

## 2023-07-21 PROCEDURE — 99900035 HC TECH TIME PER 15 MIN (STAT)

## 2023-07-21 PROCEDURE — 80053 COMPREHEN METABOLIC PANEL: CPT | Performed by: STUDENT IN AN ORGANIZED HEALTH CARE EDUCATION/TRAINING PROGRAM

## 2023-07-21 PROCEDURE — 99232 SBSQ HOSP IP/OBS MODERATE 35: CPT | Mod: ,,, | Performed by: PSYCHIATRY & NEUROLOGY

## 2023-07-21 PROCEDURE — 99232 PR SUBSEQUENT HOSPITAL CARE,LEVL II: ICD-10-PCS | Mod: ,,, | Performed by: PSYCHIATRY & NEUROLOGY

## 2023-07-21 PROCEDURE — 36415 COLL VENOUS BLD VENIPUNCTURE: CPT | Performed by: STUDENT IN AN ORGANIZED HEALTH CARE EDUCATION/TRAINING PROGRAM

## 2023-07-21 PROCEDURE — 25000003 PHARM REV CODE 250: Performed by: HOSPITALIST

## 2023-07-21 PROCEDURE — 84100 ASSAY OF PHOSPHORUS: CPT | Performed by: STUDENT IN AN ORGANIZED HEALTH CARE EDUCATION/TRAINING PROGRAM

## 2023-07-21 RX ORDER — LORAZEPAM 0.5 MG/1
2 TABLET ORAL EVERY 4 HOURS PRN
Status: DISCONTINUED | OUTPATIENT
Start: 2023-07-21 | End: 2023-07-21 | Stop reason: HOSPADM

## 2023-07-21 RX ORDER — DIAZEPAM 5 MG/1
TABLET ORAL
Qty: 41 TABLET | Refills: 0 | Status: ON HOLD | OUTPATIENT
Start: 2023-07-21 | End: 2023-11-06 | Stop reason: HOSPADM

## 2023-07-21 RX ORDER — HYDROXYZINE HYDROCHLORIDE 25 MG/1
50 TABLET, FILM COATED ORAL 3 TIMES DAILY PRN
Status: DISCONTINUED | OUTPATIENT
Start: 2023-07-21 | End: 2023-07-21 | Stop reason: HOSPADM

## 2023-07-21 RX ADMIN — THERA TABS 1 TABLET: TAB at 09:07

## 2023-07-21 RX ADMIN — DIAZEPAM 10 MG: 5 TABLET ORAL at 11:07

## 2023-07-21 RX ADMIN — FLUTICASONE FUROATE AND VILANTEROL TRIFENATATE 1 PUFF: 100; 25 POWDER RESPIRATORY (INHALATION) at 08:07

## 2023-07-21 RX ADMIN — FOLIC ACID 1 MG: 1 TABLET ORAL at 09:07

## 2023-07-21 RX ADMIN — DIAZEPAM 10 MG: 5 TABLET ORAL at 05:07

## 2023-07-21 RX ADMIN — LISINOPRIL 20 MG: 20 TABLET ORAL at 09:07

## 2023-07-21 RX ADMIN — HYDROXYZINE HYDROCHLORIDE 50 MG: 25 TABLET, FILM COATED ORAL at 12:07

## 2023-07-21 RX ADMIN — Medication 100 MG: at 09:07

## 2023-07-21 RX ADMIN — LEVOTHYROXINE SODIUM 50 MCG: 50 TABLET ORAL at 05:07

## 2023-07-21 RX ADMIN — LORAZEPAM 2 MG: 0.5 TABLET ORAL at 09:07

## 2023-07-21 NOTE — PLAN OF CARE
PIV removed.  Telemetry discontinued, and continuous monitoring station notified of discharge.  All personal belongs secured to include Vape Pen, cell phone and .  Patient requested to ambulate off of unit, transport requested.  Spouse will  medication (Valium) at Pharmacy.

## 2023-07-21 NOTE — PLAN OF CARE
[facility] - Intensive Care ([facility])  Discharge Final Note    Primary Care Provider: Andrew Rodriguez MD    Expected Discharge Date: 7/21/2023    Final Discharge Note (most recent)       Final Note - 07/21/23 1412          Final Note    Assessment Type Final Discharge Note (P)      Anticipated Discharge Disposition Home or Self Care (P)                      Important Message from Medicare      Patient discharging home today no post acute needs. Patient will be transported home by her spouse.          Paz Guerra LMSW  [facility]   [id number]

## 2023-07-21 NOTE — NURSING
Patient had light green Vape Pen in hand while sleeping.  Notified Jules Weber RN.  Device confiscated.  ALEXEY Moreno M.D. notified via secure chat.

## 2023-07-21 NOTE — NURSING
Transport canceled request.  Patient ambulated off of unit.  Per Patient spouse will pick her up and escort her home.  He will also  discharge medications at Pharmacy by her report.

## 2023-07-21 NOTE — PROGRESS NOTES
FOLLOW UP VISIT: ADDICTION PSYCHIATRY CONSULTATION SERVICE      ASSESSMENT AND PLAN:     DIAGNOSES & PROBLEMS:  Alcohol use disorder, severe, dependence    In Summary:  Patient is a 64 y/o F with past psych history of alcohol use disorder, severe, dependence, major depressive disorder, generalized anxiety disorder, and PMH of breast cancer, fibromyalgia, sarcoidosis, hypothyroidism, T2DM, and recent diagnosis of CLL who is admitted for alcohol withdrawal. Patient is interested in rehab services after discharge.     Plan:  -Continue benzodiazepine taper per primary team  Can continue Ativan PRN for breakthrough withdrawals. Can increase interval to q8 hours.   - patient requested a Psychology consult while inpatient; could benefit from services  - recommend patient enroll in addiction rehab program after discharge and medical stabilization  -Will provide resources for patient and continue discuss rehabilitation options regarding post discharge disposition as well as outpatient psychiatric treatment  - counseled on full abstinence from alcohol and substances of abuse (illicit and prescription)  - full engagement in 12 step (or equivalent) recovery program(s), including meeting attendance and acquisition/maintenance of sponsor        INTERVAL HISTORY AND ROS:     Patient resting comfortably in bed during interview. Patient alert and oriented, was linear and forward thinking throughout exam. States that after discharge she plans to attend a residential rehab in [place] for which she has already been accepted and reportedly placed a deposit. Encouraged attendance in an [facility] following discharge from residential rehab for which patient expressed understanding.     CURRENT PSYCHOTROPIC REGIMEN:  Diazepam 10 mg q6 hours  Lorazepam 2 mg q4 hours       PERTINENT PAST HISTORY AND CHART REVIEW:     Pertinent past history and chart reviewed.       EXAMINATION:     BP (!) 163/74 (BP Location: Right arm, Patient Position:  "Lying)   Pulse 77   Temp 98.4 °F (36.9 °C) (Oral)   Resp 20   Wt 81.6 kg (180 lb)   SpO2 97%   Breastfeeding No   BMI 29.05 kg/m²     MENTAL STATUS EXAMINATION:  General Appearance & Behavior: **  adequately groomed, appropriately dressed, in no apparent distress, under good behavioral control  Involuntary Movements and Motor Activity: **   patient mildly tremulous  Speech & Language: **  conversational, spontaneous, speaks and understands English proficiently  Mood: "Good"  Affect: **  reactive, mood congruent  Thought Process & Associations: **  linear and goal-directed, with no loosening of associations  Thought Content & Perceptions: **  no suicidal or homicidal ideation, no evidence of psychosis  Sensorium and Cognition: **  grossly intact, no significant deficits noted  Insight & Judgment: **  intact, demonstrates awareness of illness and adequate/appropriate behavior given the circumstances      RISK MANAGEMENT:     I[]I Y  I[x]I N  I[]I U  I[]I A  Suicidal Ideation/Behavior: **   I[]I Y  I[x]I N  I[]I U  I[]I A  Homicidal Ideation/Behavior: **  I[]I Y  I[x]I N  I[]I U  I[]I A  Violence: **  I[]I Y  I[x]I N  I[]I U  I[]I A  Self-Injurious Behavior: **    The patient is deemed to be a reliable and factually accurate historian.    I[]I Y  I[x]I N  I[]I U  I[]I A  I[]I N/A  Minimization of Risk Parameters Suspected/Evident: **  I[]I Y  I[x]I N  I[]I U  I[]I A  I[]I N/A  Exaggeration of Risk Parameters Suspected/Evident: **      [] Y  [x] N  Danger to Self:   [] Y  [x] N  Danger to Others:   [] Y  [x] N  Grave Disability:       In cases of emergency, daily coverage provided by Acute/ER Psych MD, NP, PA, or SW, with contact numbers located in [facility] On Call Schedule.    Bright Joseph  Department of Psychiatry  [facility]        KEY:     I[]I Y = Yes / Present / Endorses  I[]I N = No / Absent / Denies  I[]I U = Unknown / Unable to Assess / Unwilling to Participate  I[]I A = " Ambiguity Exists / Accuracy Uncertain  I[]I D = Denial or Minimization is Suspected/Evident  I[]I N/A = Non-Applicable    CHART REVIEW:     Available documentation has been reviewed, and pertinent elements of the chart - including previous psychiatric evaluations - have been incorporated into this evaluation where appropriate.    ADVICE AND COUNSELING:     [x] In cases of emergencies (e.g. SI/HI resulting in danger to self or others, functioning deteriorates to the level of grave disability), call 911 or [phone], or present to the emergency department for immediate assistance.  [x] Patient should not operate a motor vehicle or heavy machinery if effects of medications or underlying symptoms/condition impair the ability to safely do so.    Alcohol, Tobacco, and Drug Counseling, as well as resources, has been provided, as warranted.     Shared medical decision making and informed consent are the hallmark and bedrock of good clinical care, and as such have been employed and obtained, respectively, to the degree possible.      Risk Mitigation Strategies, Harm Reduction Techniques, and Safety Netting are important interventions that can reduce acute and chronic risk, and as such have been employed to the degree possible.    Prescription Drug Management entails the review, recommendation, or consideration without recommendation of medications, and as such was employed during the encounter.    Additional Psychoeducation has been provided, as warranted.    Discussed, to the extent possible, diagnosis, risks and benefits of proposed treatment vs alternative treatments vs no treatment, potential side effects of these treatments and the inherent unpredictability of treatment. The patient expresses understanding of the above and displays the capacity to agree with this treatment given said understanding. Patient also agrees that, currently, the benefits outweigh the risks and consents to treatment at this time.     Written material  has been provided to supplement, augment, and reinforce any discussions and interventions, via the AVS or other pre-printed handouts, as warranted.      DIAGNOSTIC TESTING:     The chart was reviewed for recent diagnostic procedures and investigations, and pertinent results are noted below.    Wt Readings from Last 2 Encounters:   07/17/23 81.6 kg (180 lb)   06/27/23 75.9 kg (167 lb 5.3 oz)     BP Readings from Last 1 Encounters:   07/21/23 (!) 163/74     Pulse Readings from Last 1 Encounters:   07/21/23 77        Blood Counts, Electrolytes & Glucose: (i.e. WBC, ANC, Hemoglobin, Hematocrit, MCV, Platelets)  Lab Results   Component Value Date    WBC 5.00 07/21/2023    GRAN 1.3 (L) 07/20/2023    GRAN 24.4 (L) 07/20/2023    HGB 10.6 (L) 07/21/2023    HCT 32.5 (L) 07/21/2023    MCV 86 07/21/2023    PLT 84 (L) 07/21/2023     07/21/2023    K 4.1 07/21/2023    CALCIUM 9.2 07/21/2023    PHOS 3.7 07/21/2023    MG 1.8 07/21/2023    CO2 26 07/21/2023    ANIONGAP 12 07/21/2023     (H) 07/21/2023    HGBA1C 4.7 06/16/2023       Renal, Liver, Pancreas, Thyroid, Parathyroid, Prolactin, CPK, Lipids & Vitamin Levels: (i.e. Cr, BUN, Anion Gap, GFR, Urine Specific Gravity, Urine Protein, Microalburnin, AST, ALT, GGT, Alk Phos,Total Bili, Total Protein, Albumin, Ammonia, INR, Amylase, Lipase, TSH, Total T3, Total T4, Free T4 PTH, Prolactin, CPK, Cholesterol, Triglycerides, LDH, HDL, Vitamin B12, Folate, Vitamin D)  Lab Results   Component Value Date    CREATININE 0.7 07/21/2023    BUN 8 07/21/2023    EGFRNORACEVR >60.0 07/21/2023    SPECGRAV 1.020 07/18/2023    PROTEINUA Trace (A) 07/18/2023    AST 42 (H) 07/21/2023    ALT 28 07/21/2023     (H) 04/01/2011    ALKPHOS 69 07/21/2023    BILITOT 0.3 07/21/2023    LABPROT 10.4 06/16/2023    ALBUMIN 3.9 07/21/2023    AMMONIA 29 04/06/2023    INR 1.0 06/16/2023    AMYLASE 19 (L) 10/14/2014    LIPASE 21 07/17/2023    TSH 1.283 06/16/2023    FREET4 0.90 04/18/2022    PTH 45  10/15/2014    CPK 25 04/01/2023    CHOL 184 04/06/2023    TRIG 161 (H) 04/06/2023    LDLCALC 107.8 04/06/2023    HDL 44 04/06/2023    TIAIXVFS57 368 06/06/2023    FOLATE 13.3 04/06/2023    THIAMINEBLOO 136 (H) 06/06/2023    [id number] 24 (L) 10/15/2014       Infection Diseases, Pregnancy Screenings & Drug Levels: (i.e. Hepatitis Panel, HIV, Syphilis, Urine & Blood Pregnancy Screens, beta hCG, Lithium, Valproic Acid, Carbamazepine, Lamotrigine, Phenytoin, Phenobarbital, Clozapine, Norclozapine, Clozapine + Norclozapine)   Lab Results   Component Value Date    HEPAIGM Negative 07/26/2017    HEPBIGM Positive (A) 07/26/2017    HEPCAB Non-reactive 03/10/2023    RCA37APLB Non-reactive 03/10/2023    HCGQUANT <2.0 05/09/2006    LITHIUM <0.1 (L) 09/29/2016       Addiction: (i.e. Urine Toxicology, Blood Alcohol, PETH, EtG, EtS, CDT, Buprenorphine, Norbuprenorphine)  Lab Results   Component Value Date    PCDSOALCOHOL 14 (A) 05/15/2023    PCDSOBENZOD Presumptive Positive (A) 07/18/2023    BARBITURATES Negative 07/18/2023    PCDSCOMETHA Negative 07/18/2023    OPIATESCREEN Negative 07/18/2023    COCAINEMETAB Negative 07/18/2023    AMPHETAMINES Negative 07/18/2023    MARIJUANATHC Negative 07/18/2023    PCDSOPHENCYN Negative 07/18/2023    PCDSUOXYCOD Presumptive Positive (A) 07/18/2023    [id number] 1099 06/16/2023    ALCOHOLMEDIC 231 (H) 07/17/2023       Results for orders placed or performed during the hospital encounter of 07/17/23   EKG 12-lead    Collection Time: 07/17/23  7:04 PM    Narrative    Test Reason : R00.0,    Vent. Rate : 104 BPM     Atrial Rate : 104 BPM     P-R Int : 140 ms          QRS Dur : 088 ms      QT Int : 346 ms       P-R-T Axes : 076 069 075 degrees     QTc Int : 454 ms    Sinus tachycardia  Otherwise normal ECG  When compared with ECG of 16-JUN-2023 17:37,  The axis Shifted right  T wave inversion no longer evident in Inferior leads  Confirmed by Sg STEPHENSON MD ([id number]) on 7/17/2023 8:10:14 PM    Referred  By: [email or web address]   SELF           Confirmed By:Sg STEPHENSON MD       Results for orders placed or performed during the hospital encounter of 07/17/23   CT Head Without Contrast    Narrative    EXAMINATION:  CT HEAD WITHOUT CONTRAST    CLINICAL HISTORY:  Headache, new or worsening (Age >= 50y);    TECHNIQUE:  Low dose axial CT images obtained throughout the head without intravenous contrast. Sagittal and coronal reconstructions were performed.    COMPARISON:  06/16/2023    FINDINGS:  Intracranial compartment:    Ventricles and sulci are normal in size for age without evidence of hydrocephalus. No extra-axial blood or fluid collections.    The brain parenchyma appears normal. No parenchymal mass, hemorrhage, edema or major vascular distribution infarct.    Skull/extracranial contents (limited evaluation): No fracture. Mastoid air cells and paranasal sinuses are essentially clear.      Impression    No acute abnormality.      Electronically signed by: Mynor Roldan  Date:    07/17/2023  Time:    21:01   Results for orders placed or performed during the hospital encounter of 06/10/22   MRI Brain Without Contrast    Narrative    EXAMINATION:  MRI BRAIN WITHOUT CONTRAST    CLINICAL HISTORY:  hx of PRES;    TECHNIQUE:  Multiplanar multisequence MR imaging of the brain was performed without intravenous contrast.    COMPARISON:  Head CT 06/10/2022, brain MRI 10/04/2017, 07/21/2017    FINDINGS:  Intracranial Compartment:    Ventricles are normal in size for age without evidence of hydrocephalus.    Ill-defined focus T2-FLAIR signal hyperintensity in the right periatrial white matter.  Few additional scattered punctate foci elsewhere within the supratentorial white matter.  These appear similar to the prior study of 10/04/2017.  No definite new focal lesions.  No diffusion restriction to indicate an acute infarction.  No remote major vascular distribution infarct.  No recent or remote hemorrhage.  No mass effect or midline  shift.    No extra-axial blood or fluid collections.    Normal vascular flow voids are preserved.    Skull/Extracranial Contents (limited evaluation):    Bone marrow signal intensity is normal.      Impression    No evidence of acute intracranial pathology.      Electronically signed by: Miguel Malagon MD  Date:    06/10/2022  Time:    09:45

## 2023-07-21 NOTE — PLAN OF CARE
Problem: Violence Risk or Actual  Goal: Anger and Impulse Control  Outcome: Ongoing, Progressing     Problem: Adult Inpatient Plan of Care  Goal: Plan of Care Review  Outcome: Ongoing, Progressing  Goal: Patient-Specific Goal (Individualized)  Outcome: Ongoing, Progressing  Goal: Absence of Hospital-Acquired Illness or Injury  Outcome: Ongoing, Progressing  Goal: Optimal Comfort and Wellbeing  Outcome: Ongoing, Progressing  Goal: Readiness for Transition of Care  Outcome: Ongoing, Progressing     Problem: Diabetes Comorbidity  Goal: Blood Glucose Level Within Targeted Range  Outcome: Ongoing, Progressing     Problem: Diabetes Comorbidity  Goal: Blood Glucose Level Within Targeted Range  Outcome: Ongoing, Progressing     Problem: Fluid and Electrolyte Imbalance (Acute Kidney Injury/Impairment)  Goal: Fluid and Electrolyte Balance  Outcome: Ongoing, Progressing     Problem: Oral Intake Inadequate (Acute Kidney Injury/Impairment)  Goal: Optimal Nutrition Intake  Outcome: Ongoing, Progressing     Problem: Fall Injury Risk  Goal: Absence of Fall and Fall-Related Injury  Outcome: Ongoing, Progressing

## 2023-07-21 NOTE — PLAN OF CARE
CHW unable to schedule the Addiction Psychiatry hospital follow up. I left a message on the office voicemail requesting an appointment on the patients behalf. I added this information to the AVS as a reminder for the patient.

## 2023-07-21 NOTE — NURSING
Pt c/o itching.  PRN Valium given 2131 r/t tremors.  Itching possibly r/t withdrawal.  Pt requesting Benadryl.  Team notified of above.  Awaiting response.

## 2023-07-21 NOTE — NURSING
Patient is AAOx4, cooperative, anxious, CIWA q4h, NSR on Airstrips, and independent.  Last BM 7/19/23.  Patient voids in toilet.  Light green Vape pen confiscated from Patient this morning.  Call light within reach. Spouse visited today. Patient educated to avoid use of Vape Pens while inpatient, but continues to disregard [facility] policy.  Call light within reach.

## 2023-07-22 NOTE — DISCHARGE SUMMARY
[facility] - Int[facility] Care ([place] [address])  [facility] Medicine  Discharge Summary      Patient Name: Earl Abdul  MRN: [id number]  IZA: [id number]  Patient Class: IP- Inpatient  Admission Date: 7/17/2023  Hospital Length of Stay: 4 days  Discharge Date and Time: 7/21/2023  4:33 PM  Attending Physician: Shelley att. providers found   Discharging Provider: Susana Moreno MD  Primary Care Provider: Andrew Rodriguez MD  [facility] Medicine Team: [facility] Susana Moreno MD  Primary Care Team: [facility]    HPI:   Ms. Abdul is a 63 year old woman with PMH EtOH use disorder with history of prior seizures, multiple admissions for alcohol withdrawal, depression with suicide attempt in 2017, breast cancer, HTN, HLD, fibromyalgia, sarcoidosis, hypothyroidism, T2DM, CLL (not on treatment) and COPD who presented to [facility] with chief complaint of alcohol intoxication and withdrawal. Patient states that she is been drinking a handle of vodka a day.  Patient wishes to detox and goes to [place] for further rehab.  Patient last drink an hour prior to arrival in the ED.  Patient states that she feels very sick.  Endorses chest pain, abdominal pain, vomiting. Most recently admitted for alcohol withdrawal 06/16 to 06/22 and discharged  on valium taper. Multiple similar admissions previously as well including May and April.      * No surgery found *      Hospital Course:   In the ED patient afebrile and hemodynamically stable saturating well on room air. Tachycardic, tremulous, diaphoretic despite blood alcohol 231. Patient admitted to the care of medicine for further evaluation and management. Started on valium taper, increased per psych recs. CIWA improved and patient stable for discharge with plans to go to inpatient alcohol rehab in [place]. Stable for discharge with valium taper       Goals of Care Treatment Preferences:  Code Status: Full Code    Health care agent: Spouse is NOK  Health care agent number: No value  filed.          What is most important right now is to focus on curative/life-prolongation (regardless of treatment burdens).  Accordingly, we have decided that the best plan to meet the patient's goals includes continuing with treatment.      Consults:   Consults (From admission, onward)        Status Ordering Provider     Inpatient consult to Psychiatry  Once        Provider:  (Not yet assigned)    Completed MONTSERRAT GREEN          No new Assessment & Plan notes have been filed under this hospital service since the last note was generated.  Service: Hospital Medicine    Final Active Diagnoses:    Diagnosis Date Noted POA    PRINCIPAL PROBLEM:  Alcohol withdrawal syndrome with complication [F10.939] 02/07/2014 Yes    CLL (chronic lymphocytic leukemia) [C91.10] 09/30/2022 Yes    Hypothyroidism [E03.9] 11/30/2021 Yes    COPD (chronic obstructive pulmonary disease) [J44.9] 04/17/2021 Yes    Malignant neoplasm of central portion of right breast in female, estrogen receptor positive [C50.111, Z17.0] 11/18/2020 Not Applicable    Hypokalemia [E87.6] 02/10/2020 Yes    History of sarcoidosis [Z86.2] 07/26/2017 Yes     Chronic    Essential hypertension [I10] 02/07/2014 Yes      Problems Resolved During this Admission:    Diagnosis Date Noted Date Resolved POA    Acute hypoxemic respiratory failure [J96.01] 06/16/2023 07/20/2023 Yes       Discharged Condition: stable    Disposition: Home or Self Care    Follow Up:   Follow-up Information     Addiction Psychiatry Follow up.    Why: A message has been sent to the Addiction Psychiatry clinic, the clinic nurse will contact you to schedule a hospital follow up visit. However, if you do not hear from them within 24 to 48 hours of discharge please call to schedule the appointment.  Contact information:  Addiction Psychiatry   [phone]                     Patient Instructions:      Ambulatory referral/consult to Addiction Psychiatry   Standing Status:  Future   Referral Priority: Routine Referral Type: Psychiatric   Referral Reason: Specialty Services Required   Requested Specialty: Addiction Medicine   Number of Visits Requested: 1     Diet Cardiac     Diet diabetic     Notify your health care provider if you experience any of the following:  persistent nausea and vomiting or diarrhea     Activity as tolerated       Significant Diagnostic Studies: Labs: All labs within the past 24 hours have been reviewed    Pending Diagnostic Studies:     None         Medications:  Reconciled Home Medications:      Medication List      CHANGE how you take these medications    diazePAM 5 MG tablet  Commonly known as: VALIUM  Take 2 tablets (10 mg total) by mouth every 8 (eight) hours for 3 days, THEN 2 tablets (10 mg total) every 12 (twelve) hours for 3 days, THEN 1 tablet (5 mg total) every 12 (twelve) hours for 3 days, THEN 1 tablet (5 mg total) once daily for 3 days, THEN 0.5 tablets (2.5 mg total) once daily for 3 days.  Start taking on: July 21, 2023  What changed: See the new instructions.        CONTINUE taking these medications    acetaminophen 500 MG tablet  Commonly known as: TYLENOL  Take 500-1,500 mg by mouth daily as needed for Pain.     albuterol 90 mcg/actuation inhaler  Commonly known as: PROVENTIL/VENTOLIN HFA     BREO ELLIPTA 100-25 mcg/dose diskus inhaler  Generic drug: fluticasone furoate-vilanteroL  Inhale 1 puff into the lungs once daily. Controller     dicyclomine 20 mg tablet  Commonly known as: BENTYL  Take 20 mg by mouth 4 (four) times daily.     DULoxetine 60 MG capsule  Commonly known as: CYMBALTA  Take 60 mg by mouth.     folic acid 1 MG tablet  Commonly known as: FOLVITE  Take 1 tablet (1 mg total) by mouth once daily.     gabapentin 600 MG tablet  Commonly known as: NEURONTIN  Take 1 tablet (600 mg total) by mouth 3 (three) times daily. for 10 days     levothyroxine 50 MCG tablet  Commonly known as: SYNTHROID  Take 1 tablet (50 mcg total) by mouth  before breakfast.     lisinopriL 20 MG tablet  Commonly known as: PRINIVIL,ZESTRIL  Take 1 tablet (20 mg total) by mouth once daily.     loperamide 2 mg capsule  Commonly known as: IMODIUM  Take 2 mg by mouth 4 (four) times daily as needed for Diarrhea (Per package directions).     melatonin  Commonly known as: MELATIN  Take by mouth nightly as needed for Insomnia.     metFORMIN 500 MG ER 24hr tablet  Commonly known as: GLUCOPHAGE-XR  Take 2 tablets (1,000 mg total) by mouth 2 (two) times daily with meals.     multivitamin Tab  Take 1 tablet by mouth once daily.     ondansetron 4 MG Tbdl  Commonly known as: ZOFRAN-ODT  Take 1 tablet (4 mg total) by mouth every 6 (six) hours as needed (nausea/vomiting).     pantoprazole 40 MG tablet  Commonly known as: PROTONIX  Take 40 mg by mouth once daily.     thiamine 100 MG tablet  Take 1 tablet (100 mg total) by mouth once daily.        STOP taking these medications    chlordiazepoxide 25 MG Cap  Commonly known as: LIBRIUM            Indwelling Lines/Drains at time of discharge:   Lines/Drains/Airways     None                 Time spent on the discharge of patient: 40 minutes         Susana Moreno MD  Department of Hospital Medicine  [facility] - Intensive Care ([place]

## 2023-08-01 ENCOUNTER — PATIENT MESSAGE (OUTPATIENT)
Dept: HEMATOLOGY/ONCOLOGY | Facility: CLINIC | Age: 63
End: 2023-08-01
Payer: COMMERCIAL

## 2023-08-02 ENCOUNTER — TELEPHONE (OUTPATIENT)
Dept: HEMATOLOGY/ONCOLOGY | Facility: CLINIC | Age: 63
End: 2023-08-02
Payer: COMMERCIAL

## 2023-08-02 DIAGNOSIS — C91.10 CLL (CHRONIC LYMPHOCYTIC LEUKEMIA): Primary | ICD-10-CM

## 2023-08-18 NOTE — HPI
"Ms. Abdul is a 59/F with PMH HTN, h/o alcohol abuse with fatty liver disease, chronic back pain s/p surgery with subsequent opiate dependence, transitioned to suboxone with recent detox in [place], recent admission to [facility] in [place], [place] from 02/06 - 02/08 with drug-induced akathisia who presented to ED 02/10 with nausea and vomiting for days. She reports that she was having some abdominal discomfort, nausea and vomiting while still in [place] but that her symptoms have persisted. She reports minimal PO intake and "can't take any pills or even get water down." ED evaluation was notable for K of 2.7; she was unable to tolerate PO potassium repletion. Hospital medicine was subsequently contacted for admission.  "
Yes

## 2023-08-25 ENCOUNTER — PATIENT MESSAGE (OUTPATIENT)
Dept: HEMATOLOGY/ONCOLOGY | Facility: CLINIC | Age: 63
End: 2023-08-25
Payer: COMMERCIAL

## 2023-08-25 DIAGNOSIS — E03.9 HYPOTHYROIDISM, UNSPECIFIED TYPE: ICD-10-CM

## 2023-08-25 RX ORDER — LEVOTHYROXINE SODIUM 50 UG/1
50 TABLET ORAL
Qty: 90 TABLET | Refills: 3 | Status: ON HOLD | OUTPATIENT
Start: 2023-08-25 | End: 2023-11-24 | Stop reason: DRUGHIGH

## 2023-08-25 NOTE — TELEPHONE ENCOUNTER
No care due was identified.  [facility] Embedded Care Due Messages. Reference number: [id number].   8/25/2023 5:32:03 AM CDT

## 2023-08-25 NOTE — TELEPHONE ENCOUNTER
Refill Routing Note   Medication(s) are not appropriate for processing by [facility] for the following reason(s):      Patient seen in ED/Hospital since LOV with provider    [facility] action(s):  Defer Care Due:  None identified            Appointments  past 12m or future 3m with PCP    Date Provider   Last Visit   6/27/2023 Andrew Rodriguez MD   Next Visit   Visit date not found Andrew Rodriguez MD   ED visits in past 90 days: 0        Note composed:12:12 PM 08/25/2023

## 2023-09-06 NOTE — ED NOTES
2 RN attempted PIV x3 total. US guided PIV requested.   Agree, unlikely due to AF as her AF is not new and her symptoms sound more acute. Would encourage her to follow through with CT of back advised by her PCP.  Can also offer her stress test with imaging to rule out ischemia. If she cannot walk on treadmill, would order Lexiscan

## 2023-09-11 ENCOUNTER — TELEPHONE (OUTPATIENT)
Dept: HEMATOLOGY/ONCOLOGY | Facility: CLINIC | Age: 63
End: 2023-09-11
Payer: COMMERCIAL

## 2023-09-11 ENCOUNTER — TELEPHONE (OUTPATIENT)
Dept: INTERNAL MEDICINE | Facility: CLINIC | Age: 63
End: 2023-09-11
Payer: COMMERCIAL

## 2023-09-11 ENCOUNTER — PATIENT MESSAGE (OUTPATIENT)
Dept: INTERNAL MEDICINE | Facility: CLINIC | Age: 63
End: 2023-09-11
Payer: COMMERCIAL

## 2023-09-11 ENCOUNTER — NURSE TRIAGE (OUTPATIENT)
Dept: ADMINISTRATIVE | Facility: CLINIC | Age: 63
End: 2023-09-11
Payer: COMMERCIAL

## 2023-09-11 ENCOUNTER — OFFICE VISIT (OUTPATIENT)
Dept: INTERNAL MEDICINE | Facility: CLINIC | Age: 63
End: 2023-09-11
Payer: COMMERCIAL

## 2023-09-11 VITALS — HEIGHT: 66 IN | BODY MASS INDEX: 30.72 KG/M2 | OXYGEN SATURATION: 90 % | WEIGHT: 191.13 LBS | HEART RATE: 100 BPM

## 2023-09-11 DIAGNOSIS — C91.10 CLL (CHRONIC LYMPHOCYTIC LEUKEMIA): ICD-10-CM

## 2023-09-11 DIAGNOSIS — R52 CHRONIC GENERALIZED PAIN: ICD-10-CM

## 2023-09-11 DIAGNOSIS — G89.29 CHRONIC GENERALIZED PAIN: ICD-10-CM

## 2023-09-11 DIAGNOSIS — M79.7 FIBROMYALGIA: Primary | ICD-10-CM

## 2023-09-11 PROCEDURE — 1101F PR PT FALLS ASSESS DOC 0-1 FALLS W/OUT INJ PAST YR: ICD-10-PCS | Mod: CPTII,S$GLB,,

## 2023-09-11 PROCEDURE — 99214 OFFICE O/P EST MOD 30 MIN: CPT | Mod: S$GLB,,,

## 2023-09-11 PROCEDURE — 99999 PR PBB SHADOW E&M-EST. PATIENT-LVL III: ICD-10-PCS | Mod: PBBFAC,,,

## 2023-09-11 PROCEDURE — 4010F PR ACE/ARB THEARPY RXD/TAKEN: ICD-10-PCS | Mod: CPTII,S$GLB,,

## 2023-09-11 PROCEDURE — 3288F FALL RISK ASSESSMENT DOCD: CPT | Mod: CPTII,S$GLB,,

## 2023-09-11 PROCEDURE — 4010F ACE/ARB THERAPY RXD/TAKEN: CPT | Mod: CPTII,S$GLB,,

## 2023-09-11 PROCEDURE — 3008F PR BODY MASS INDEX (BMI) DOCUMENTED: ICD-10-PCS | Mod: CPTII,S$GLB,,

## 2023-09-11 PROCEDURE — 3288F PR FALLS RISK ASSESSMENT DOCUMENTED: ICD-10-PCS | Mod: CPTII,S$GLB,,

## 2023-09-11 PROCEDURE — 1101F PT FALLS ASSESS-DOCD LE1/YR: CPT | Mod: CPTII,S$GLB,,

## 2023-09-11 PROCEDURE — 3008F BODY MASS INDEX DOCD: CPT | Mod: CPTII,S$GLB,,

## 2023-09-11 PROCEDURE — 3072F LOW RISK FOR RETINOPATHY: CPT | Mod: CPTII,S$GLB,,

## 2023-09-11 PROCEDURE — 3044F HG A1C LEVEL LT 7.0%: CPT | Mod: CPTII,S$GLB,,

## 2023-09-11 PROCEDURE — 99214 PR OFFICE/OUTPT VISIT, EST, LEVL IV, 30-39 MIN: ICD-10-PCS | Mod: S$GLB,,,

## 2023-09-11 PROCEDURE — 3072F PR LOW RISK FOR RETINOPATHY: ICD-10-PCS | Mod: CPTII,S$GLB,,

## 2023-09-11 PROCEDURE — 3044F PR MOST RECENT HEMOGLOBIN A1C LEVEL <7.0%: ICD-10-PCS | Mod: CPTII,S$GLB,,

## 2023-09-11 PROCEDURE — 99999 PR PBB SHADOW E&M-EST. PATIENT-LVL III: CPT | Mod: PBBFAC,,,

## 2023-09-11 RX ORDER — PREDNISONE 20 MG/1
20 TABLET ORAL DAILY
Qty: 3 TABLET | Refills: 0 | Status: SHIPPED | OUTPATIENT
Start: 2023-09-11 | End: 2023-09-14

## 2023-09-11 NOTE — TELEPHONE ENCOUNTER
----- Message from Song Mejia sent at 9/11/2023  4:53 PM CDT -----  Regarding: pt meds  Name of Who is Calling:MARTY SALDAÑA [[id number]]        What is the request in detail: Pt inquiring if provider can call in script to pharmacy regarding pain   Please advise     [id number] - [place] - [address] AT [facility]  [address] [facility] [address]  Phone: [phone] Fax: [phone]              Can the clinic reply by [facility]: yes         What Number to Call Back if not in [facility]: Telephone Information:  Mobile          [phone]

## 2023-09-11 NOTE — TELEPHONE ENCOUNTER
Patient has been contacted in regards to same day appointment for entire body pain. Appointment has been set at [facility] for 9/11/2023 at 315 PM with FREEMAN Lake MD. Patient confirms

## 2023-09-11 NOTE — TELEPHONE ENCOUNTER
----- Message from Krista Eh sent at 9/11/2023  8:15 AM CDT -----  Regarding: concerns  Name of Who is Calling: Earl           What is the request in detail: Patient is requesting a call back to be seen.She stated she just came back from [place] and symptoms are chest congestion. She don't know if she was exposed or not to COVID.           Can the clinic reply by [facility]: Yes           What Number to Call Back if not in [facility]: [phone]

## 2023-09-11 NOTE — TELEPHONE ENCOUNTER
"----- Message from Cinthia Izaguirre sent at 9/11/2023 11:10 AM CDT -----  Regarding: Pt advice  Contact: Pt     Pt requesting call back in regards to being seen today. Pt stated he is in a lot of pain.   Please call and adv @       Confirmed contact below:   Contact Name: Earl Abdul  Phone Number: [phone]               Additional Notes:  "Thank you for all that you do for our patients"                                         "

## 2023-09-11 NOTE — TELEPHONE ENCOUNTER
"----- Message from Cinthia Izaguirre sent at 9/11/2023 11:10 AM CDT -----  Regarding: Pt advice  Contact: Pt     Pt requesting call back in regards to being seen today. Pt stated he is in a lot of pain.   Please call and adv @       Confirmed contact below:   Contact Name: Earl Abdul  Phone Number: [phone]               Additional Notes:  "Thank you for all that you do for our patients"                                         "

## 2023-09-11 NOTE — PROGRESS NOTES
I have seen the patient, reviewed the house staff's history and physical, assessment and plan. I have personally interviewed and reexamined the patient at bedside and agree with the findings, assessment and plan.     This is a same-day appointment. After introducing myself as a precepting staff member for our residency program, I inquired from patient the nature of her visit.  She stated she was in pain from head to toe and that she had just told me that was the reason for the visit.  She is requesting medication for pain.  She was unable to specify what medication she was requesting.  She states that she had similar problems in the past, from time to time, and was given a medication from her primary care doctor.  I asked several questions to help diagnose, she was not particularly descriptive.  She did mentioned that pain was in the larger muscle groups.  There was no headache or symptoms to suggest acute   orthopedic or Neurologic dysfunction phenomenon. There was no report of fever, chills, nausea, abdominal pain chest pain or shortness of breath.  Likewise there was no acute urinary complaints such as hematuria, dysuria or flank pain suggestive of kidney origin.  She did not describe acute weakness or numbness.  She stated her last alcoholic drink was 6 weeks ago and today exhibited no signs or symptoms to suggest acute alcohol intoxication or withdrawal [noting diagnoses on her problem list].  She states she has a poor memory, and had some difficulty answering some historic questions about her medical conditions.  Likewise she stated she is not currently on a statin medicine to her knowledge.    I proceeded to explain that our resident Clinic does not prescribe benzodiazepine or opioid medication by protocol.  I explained that the resident and myself had reviewed her chart, noted she has a number of medical conditions, multiple medications and concerns for interactions with medications that would potentially  have renal side effects, GI side effects.  This would include nonsteroidal anti-inflammatory.  She is on muscle relaxer to begin with, states that the Robaxin and gabapentin are not helpful currently.   I offered to draw lab work [rheumatologic workup]  and she declined.  Patient became a aggressive, did use profanity  at least twice, stated she was appointed to this clinic to get pain medication.  Apparently, her primary care physician could not see her today. I explained that we do not have control over the scheduling process and that [facility] will schedule patients for same day appointments with any available physician in the system with an open appointment slot. Several staff members overheard patient from the [facility] area.  Due to concerns, security was summoned to standby.    Offered patient prednisone for a short course until she could get in with her primary care physician.  She initially said this would be okay but then again asked for pain medicine to go along with it.  Upon chart review she has a diagnosis of diabetes but no uncontrolled sugars.  After patient declined further workup, and it was deemed she was not in any acute distress by review of vital signs and overall condition, she left on her own.  We advised her to follow up with her primary care doctor and also that she was overdue to see her Hematology-Oncology specialist.  I did explain the side effects of prednisone which could include jittery and hyperactivity, elevation of blood sugar.      Upon chart review, she was admitted for alcohol withdrawal July 2023,  and had recently been in [place] where she had some additional neurologic evaluation.  She did not appear acutely intoxicated, confused at today's visit.  There were no focal neurologic deficits noted upon observing her gait.  She did not consent to further evaluation as mentioned above.    Today's appointment was >35 minutes including direct patient care, chart review (such as review  of consult notes, op notes, ER notes, labs, imaging, path, cultures, etc.), medication reconciliation and adjustment, and in some cases >50% time spent in counseling.

## 2023-09-11 NOTE — TELEPHONE ENCOUNTER
Called Patient to inquire about [place] travel and COVID symptoms, patient declined, reports not traveled to [place] and feels she does not have COVID symptoms. Pt. Reports seeking Dr. Rodriguez for pain medication for generalized body pain. Pt. Informed need for appointment; appointment scheduled for Wednesday, September 13, 2023 at 2:40PM. Patient verbalized understanding.

## 2023-09-11 NOTE — PROGRESS NOTES
Name: Earl Abdul  : 1960  Date of Service: 2023     Reason for visit:   Chief Complaint   Patient presents with    Generalized Body Aches       HPI: Earl Abdul is a 63 y.o. year old female with PMHx of T2DM, COPD, GERD, Sarcodosis, Fibromyalgia, Alcohol use disorder, CLL who presents for generalized body aches. Pt states she started having myalgias all over since yesterday. Pt states pain onset was sudden with 9/10 intensity. Describes pain as throbbing worst in her hands. Pt states all she can do is rest but that does not help. Reports her gabapentin does not help her pain. Takes robaxin which doesn't help. Pt also endorses fatigue. Denies fever, chills, cp, n/v/d, abd pain, HA, leg swelling, or any other complaints. Pt reports she usually has episodes like this 2-3 times per year and requires some pain medicine. Pt is unsure which medication her PCP gives her for these episodes. States has been given prednisone before. Allergies to lortab and promethazine. Pt repeatedly mentioned need for pain medicine and appeared agitated.     PMH:   Past Medical History:   Diagnosis Date    Anemia     Anemia     Controlled type 2 diabetes mellitus without complication, without long-term current use of insulin 2021    COPD (chronic obstructive pulmonary disease)     Depression     Diverticulitis     Fatty liver     GERD (gastroesophageal reflux disease)     Hyperlipidemia     Hypertension     Pancreatitis     Peptic ulcer disease     Polysubstance abuse     Posterior reversible encephalopathy syndrome     Sarcoidosis of lung     Sarcoidosis of lung     over 30 yrs ago    Seizures     2017    Suicide attempt     Suicide ideation        Surgical History:   Past Surgical History:   Procedure Laterality Date    APPENDECTOMY      BILATERAL MASTECTOMY Bilateral 10/29/2020    Procedure: MASTECTOMY, BILATERAL;  Surgeon: Baylee Kevin MD;  Location: [facility] OR [address] [place] [address];  Service: General;  Laterality:  Bilateral;    BREAST REVISION SURGERY Bilateral 2/11/2021    Procedure: BREAST REVISION SURGERY;  Surgeon: Scottie Johnson MD;  Location: [facility] OR [address] [place] [address];  Service: Plastics;  Laterality: Bilateral;    COLONOSCOPY N/A 7/28/2017    Procedure: COLONOSCOPY;  Surgeon: Aaron Alvarado MD;  Location: [facility] ENDO;  Service: Endoscopy;  Laterality: N/A;    ESOPHAGOGASTRODUODENOSCOPY  10/7/2016, 11/6/2014    2016 - gastritis, duodenitis, 2014 erosive gastritis    ESOPHAGOGASTRODUODENOSCOPY N/A 2/11/2020    Procedure: ESOPHAGOGASTRODUODENOSCOPY (EGD);  Surgeon: Fawn Garrido MD;  Location: [facility] ENDO;  Service: Endoscopy;  Laterality: N/A;    ESOPHAGOGASTRODUODENOSCOPY N/A 4/19/2021    Procedure: EGD (ESOPHAGOGASTRODUODENOSCOPY);  Surgeon: Paramjit Martino MD;  Location: [facility] ENDO;  Service: Endoscopy;  Laterality: N/A;    FLEXIBLE SIGMOIDOSCOPY  11/06/2014    colitis    HYSTERECTOMY      IMPLANTATION OF PERMANENT SACRAL NERVE STIMULATOR N/A 7/12/2022    Procedure: INSERTION, NEUROSTIMULATOR, PERMANENT, SACRAL;  Surgeon: Juaquin Edwards MD;  Location: [facility] OR [address] [place] [address];  Service: Urology;  Laterality: N/A;  1hr    INJECTION FOR SENTINEL NODE IDENTIFICATION Right 10/29/2020    Procedure: INJECTION, FOR SENTINEL NODE IDENTIFICATION;  Surgeon: Baylee Kevin MD;  [facility];  Service: General;  Laterality: Right;    INJECTION OF JOINT Right 10/10/2019    Procedure: Injection, Joint RIGHT ILIOPSOAS BURSA/TENDON INJECTION AND RIGHT GLUTEAL TENDON INJECTION WITH STEROID AND LIDOCAINE;  Surgeon: Guillaume Rico MD;  Location: [facility] PAIN MGT;  Service: Pain Management;  Laterality: Right;  NEEDS CONSENT    INSERTION OF BREAST TISSUE EXPANDER Bilateral 10/29/2020    Procedure: INSERTION, TISSUE EXPANDER, BREAST;  Surgeon: Scottie Johnson MD;  [facility];  Service: Plastics;  Laterality: Bilateral;  Right breast: 1082 g  Left breast: 1076 g    LIPOSUCTION Bilateral 2/11/2021    Procedure:  LIPOSUCTION;  Surgeon: Scottie Johnson MD;  Location: [facility] OR 2ND FLR;  Service: Plastics;  Laterality: Bilateral;    mediastenoscopy      REPLACEMENT OF IMPLANT OF BREAST Bilateral 2/11/2021    Procedure: REPLACEMENT, IMPLANT, BREAST;  Surgeon: Scottie Johnson MD;  Location: [facility] OR 2ND FLR;  Service: Plastics;  Laterality: Bilateral;    SENTINEL LYMPH NODE BIOPSY Right 10/29/2020    Procedure: BIOPSY, LYMPH NODE, SENTINEL;  Surgeon: Baylee Kevin MD;  [facility];  Service: General;  Laterality: Right;    TONSILLECTOMY N/A 1970    TUBAL LIGATION         Allergies:   Review of patient's allergies indicates:   Allergen Reactions    Lortab [hydrocodone-acetaminophen] Itching    Promethazine Itching and Other (See Comments)       Medications:     Current Outpatient Medications:     acetaminophen (TYLENOL) 500 MG tablet, Take 500-1,500 mg by mouth daily as needed for Pain., Disp: , Rfl:     albuterol (PROVENTIL/VENTOLIN HFA) 90 mcg/actuation inhaler, , Disp: , Rfl:     diazePAM (VALIUM) 5 MG tablet, Take 2 tablets (10 mg total) by mouth every 8 (eight) hours for 3 days, THEN 2 tablets (10 mg total) every 12 (twelve) hours for 3 days, THEN 1 tablet (5 mg total) every 12 (twelve) hours for 3 days, THEN 1 tablet (5 mg total) once daily for 3 days, THEN 0.5 tablets (2.5 mg total) once daily for 3 days., Disp: 41 tablet, Rfl: 0    dicyclomine (BENTYL) 20 mg tablet, Take 20 mg by mouth 4 (four) times daily., Disp: , Rfl:     DULoxetine (CYMBALTA) 60 MG capsule, Take 60 mg by mouth., Disp: , Rfl:     fluticasone furoate-vilanteroL (BREO ELLIPTA) 100-25 mcg/dose diskus inhaler, Inhale 1 puff into the lungs once daily. Controller, Disp: 60 each, Rfl: 3    folic acid (FOLVITE) 1 MG tablet, Take 1 tablet (1 mg total) by mouth once daily., Disp: 30 tablet, Rfl: 11    gabapentin (NEURONTIN) 600 MG tablet, Take 1 tablet (600 mg total) by mouth 3 (three) times daily. for 10 days, Disp: 30 tablet, Rfl:  0    levothyroxine (SYNTHROID) 50 MCG tablet, TAKE 1 TABLET(50 MCG) BY MOUTH BEFORE BREAKFAST, Disp: 90 tablet, Rfl: 3    lisinopriL (PRINIVIL,ZESTRIL) 20 MG tablet, Take 1 tablet (20 mg total) by mouth once daily., Disp: 30 tablet, Rfl: 2    loperamide (IMODIUM) 2 mg capsule, Take 2 mg by mouth 4 (four) times daily as needed for Diarrhea (Per package directions)., Disp: , Rfl:     melatonin (MELATIN), Take by mouth nightly as needed for Insomnia., Disp: , Rfl:     metFORMIN (GLUCOPHAGE-XR) 500 MG ER 24hr tablet, Take 2 tablets (1,000 mg total) by mouth 2 (two) times daily with meals., Disp: 360 tablet, Rfl: 3    multivitamin Tab, Take 1 tablet by mouth once daily., Disp: 30 tablet, Rfl: 3    ondansetron (ZOFRAN-ODT) 4 MG TbDL, Take 1 tablet (4 mg total) by mouth every 6 (six) hours as needed (nausea/vomiting)., Disp: 12 tablet, Rfl: 0    pantoprazole (PROTONIX) 40 MG tablet, Take 40 mg by mouth once daily., Disp: , Rfl:     predniSONE (DELTASONE) 20 MG tablet, Take 1 tablet (20 mg total) by mouth once daily. for 3 days, Disp: 3 tablet, Rfl: 0    thiamine 100 MG tablet, Take 1 tablet (100 mg total) by mouth once daily., Disp: 30 tablet, Rfl: 11  No current facility-administered medications for this visit.    Facility-Administered Medications Ordered in Other Visits:     albuterol sulfate nebulizer solution 2.5 mg, 2.5 mg, Nebulization, Once, Andrew Rodriguez MD    Family History:   Family History   Problem Relation Age of Onset    Heart attack Father     Diabetes Father     Hypertension Father     Diabetes Mother     Hypertension Mother     Breast cancer Maternal Aunt     Colon cancer Maternal Uncle     Breast cancer Daughter     Ovarian cancer Neg Hx     Cancer Neg Hx        Social History:   Social History     Socioeconomic History    Marital status:    Tobacco Use    Smoking status: Every Day     Current packs/day: 0.00     Average packs/day: 0.5 packs/day for 30.0 years (15.0 ttl pk-yrs)     Types:  Vaping with nicotine, Cigarettes     Start date: 1991     Last attempt to quit: 2021     Years since quittin.6    Smokeless tobacco: Never   Substance and Sexual Activity    Alcohol use: Yes     Comment: vodka daily (half a regular bottle)    Drug use: Yes     Types: Marijuana     Comment: gummies    Sexual activity: Yes     Birth control/protection: Surgical     Social Determinants of Health     Financial Resource Strain: Low Risk  (2023)    Overall Financial Resource Strain (CARDIA)     Difficulty of Paying Living Expenses: Not very hard   Food Insecurity: No Food Insecurity (2023)    Hunger Vital Sign     Worried About Running Out of Food in the Last Year: Never true     Ran Out of Food in the Last Year: Never true   Transportation Needs: No Transportation Needs (2023)    PRAPARE - Transportation     Lack of Transportation (Medical): No     Lack of Transportation (Non-Medical): No   Physical Activity: Insufficiently Active (2023)    Exercise Vital Sign     Days of Exercise per Week: 3 days     Minutes of Exercise per Session: 10 min   Stress: No Stress Concern Present (2023)    [employer] - Occupational Stress Questionnaire     Feeling of Stress : Not at all   Recent Concern: Stress - Stress Concern Present (2023)    [employer] - Occupational Stress Questionnaire     Feeling of Stress : To some extent   Social Connections: Unknown (2023)    Social Connection and Isolation Panel [NHANES]     Frequency of Communication with Friends and Family: Three times a week     Frequency of Social Gatherings with Friends and Family: Three times a week     Active Member of Clubs or Organizations: No     Attends Club or Organization Meetings: Never     Marital Status:    Housing Stability: Unknown (2023)    Housing Stability Vital Sign     Unable to Pay for Housing in the Last Year: No     Unstable Housing in  "the Last Year: No       Review of Systems:   Review of Systems   Constitutional:  Positive for malaise/fatigue. Negative for chills and fever.   HENT:  Negative for sore throat.    Eyes:  Negative for blurred vision.   Respiratory:  Negative for cough and shortness of breath.    Cardiovascular:  Negative for chest pain, palpitations and leg swelling.   Gastrointestinal:  Negative for abdominal pain, nausea and vomiting.   Musculoskeletal:  Positive for myalgias (all over).   Neurological:  Negative for headaches.       Vitals:   Vitals:    09/11/23 1521   Pulse: 100   SpO2: (!) 90%   Weight: 86.7 kg (191 lb 2.2 oz)   Height: 5' 6" (1.676 m)     Body mass index is 30.85 kg/m².    Physical Exam:   Physical Exam  Vitals reviewed.   HENT:      Head: Normocephalic and atraumatic.      Right Ear: External ear normal.      Left Ear: External ear normal.      Nose: Nose normal.      Mouth/Throat:      Mouth: Mucous membranes are moist.   Eyes:      Extraocular Movements: Extraocular movements intact.      Conjunctiva/sclera: Conjunctivae normal.      Pupils: Pupils are equal, round, and reactive to light.   Cardiovascular:      Rate and Rhythm: Normal rate and regular rhythm.   Pulmonary:      Effort: Pulmonary effort is normal. No respiratory distress.      Breath sounds: Normal breath sounds.   Abdominal:      General: Abdomen is flat.      Palpations: Abdomen is soft.      Tenderness: There is no abdominal tenderness.   Musculoskeletal:         General: Tenderness (B/l upper and lower extremities) present. No swelling. Normal range of motion.      Cervical back: Normal range of motion and neck supple.   Skin:     General: Skin is warm and dry.   Neurological:      General: No focal deficit present.      Mental Status: She is alert and oriented to person, place, and time.   Psychiatric:         Behavior: Behavior is agitated.       Labs: Previous labs reviewed.    Imaging: Previous imaging reviewed.    Assessment/Plan: "   Fibromyalgia    Chronic generalized pain    CLL (chronic lymphocytic leukemia)  -     Ambulatory referral/consult to Hematology / Oncology; Future; Expected date: 09/18/2023    Other orders  -     predniSONE (DELTASONE) 20 MG tablet; Take 1 tablet (20 mg total) by mouth once daily. for 3 days  Dispense: 3 tablet; Refill: 0      Discussed with patient that I would not be able to prescribe any pain medicines other than toradol which would likely exacerbate her GERD and possibly impact kidney function. Further offered patient flexeril but she stated she is already on robaxin. Pt was offered lab work for possible rheumatologic workup and she declined. Pt became aggressive to me and staff attending. Several staff members overheard patient from the common area. Security was notified and on stand by. Offered patient low dose prednisone until she could follow up with PCP. She was amenable but then asked repeatedly for pain medications. Patient left prior to receiving AVS.     Preventative Health: Deferred at this time due to acute complaint.     Disposition: Pt ordered prednisone 20mg for 3 days. Emphasized f/u with PCP for further management. Ambulatory referral sent to Heme/Onc due to patient's CLL.     Discussed with Dr. Gonzales.

## 2023-09-11 NOTE — TELEPHONE ENCOUNTER
Call was disconnected when  tried to transfer to triage nurse. Pt stated she was aching all over per cg. Unable to reach pt times 2 attempts. Message left on voice mail.   Reason for Disposition   Second attempt to contact caller AND no contact made. Phone number verified.    Protocols used: No Contact or Duplicate Contact Call-A-OH

## 2023-09-16 ENCOUNTER — HOSPITAL ENCOUNTER (INPATIENT)
Facility: OTHER | Age: 63
LOS: 1 days | Discharge: HOME | DRG: 872 | End: 2023-09-17
Attending: EMERGENCY MEDICINE | Admitting: STUDENT IN AN ORGANIZED HEALTH CARE EDUCATION/TRAINING PROGRAM
Payer: COMMERCIAL

## 2023-09-16 DIAGNOSIS — R00.0 TACHYCARDIA: ICD-10-CM

## 2023-09-16 DIAGNOSIS — E87.20 LACTIC ACIDOSIS: ICD-10-CM

## 2023-09-16 DIAGNOSIS — F10.929 ACUTE ALCOHOLIC INTOXICATION WITH COMPLICATION: Primary | ICD-10-CM

## 2023-09-16 DIAGNOSIS — E87.20 METABOLIC ACIDOSIS: ICD-10-CM

## 2023-09-16 LAB
ALBUMIN SERPL BCP-MCNC: 4.5 G/DL (ref 3.5–5.2)
ALP SERPL-CCNC: 73 U/L (ref 55–135)
ALT SERPL W/O P-5'-P-CCNC: 26 U/L (ref 10–44)
AMPHET+METHAMPHET UR QL: NEGATIVE
ANION GAP SERPL CALC-SCNC: 24 MMOL/L (ref 8–16)
ANISOCYTOSIS BLD QL SMEAR: SLIGHT
APAP SERPL-MCNC: <3 UG/ML (ref 10–20)
AST SERPL-CCNC: 41 U/L (ref 10–40)
BARBITURATES UR QL SCN>200 NG/ML: NEGATIVE
BASOPHILS # BLD AUTO: ABNORMAL K/UL (ref 0–0.2)
BASOPHILS NFR BLD: 0 % (ref 0–1.9)
BENZODIAZ UR QL SCN>200 NG/ML: NEGATIVE
BILIRUB SERPL-MCNC: 0.6 MG/DL (ref 0.1–1)
BILIRUB UR QL STRIP: NEGATIVE
BNP SERPL-MCNC: 14 PG/ML (ref 0–99)
BUN SERPL-MCNC: 17 MG/DL (ref 8–23)
BZE UR QL SCN: NEGATIVE
CALCIUM SERPL-MCNC: 9.2 MG/DL (ref 8.7–10.5)
CANNABINOIDS UR QL SCN: NEGATIVE
CHLORIDE SERPL-SCNC: 97 MMOL/L (ref 95–110)
CLARITY UR: CLEAR
CO2 SERPL-SCNC: 16 MMOL/L (ref 23–29)
COLOR UR: YELLOW
CREAT SERPL-MCNC: 0.8 MG/DL (ref 0.5–1.4)
CREAT UR-MCNC: 70.4 MG/DL (ref 15–325)
CRP SERPL-MCNC: 6.2 MG/L (ref 0–8.2)
CTP QC/QA: YES
CTP QC/QA: YES
DIFFERENTIAL METHOD: ABNORMAL
EOSINOPHIL # BLD AUTO: ABNORMAL K/UL (ref 0–0.5)
EOSINOPHIL NFR BLD: 0 % (ref 0–8)
ERYTHROCYTE [DISTWIDTH] IN BLOOD BY AUTOMATED COUNT: 15.4 % (ref 11.5–14.5)
EST. GFR  (NO RACE VARIABLE): >60 ML/MIN/1.73 M^2
ETHANOL UR-MCNC: 221 MG/DL
GLUCOSE SERPL-MCNC: 57 MG/DL (ref 70–110)
GLUCOSE UR QL STRIP: NEGATIVE
HCT VFR BLD AUTO: 38.1 % (ref 37–48.5)
HGB BLD-MCNC: 11.9 G/DL (ref 12–16)
HGB UR QL STRIP: NEGATIVE
IMM GRANULOCYTES # BLD AUTO: ABNORMAL K/UL (ref 0–0.04)
IMM GRANULOCYTES NFR BLD AUTO: ABNORMAL % (ref 0–0.5)
KETONES UR QL STRIP: ABNORMAL
LACTATE SERPL-SCNC: 1.4 MMOL/L (ref 0.5–2.2)
LACTATE SERPL-SCNC: 3.1 MMOL/L (ref 0.5–2.2)
LEUKOCYTE ESTERASE UR QL STRIP: NEGATIVE
LIPASE SERPL-CCNC: 21 U/L (ref 4–60)
LYMPHOCYTES # BLD AUTO: ABNORMAL K/UL (ref 1–4.8)
LYMPHOCYTES NFR BLD: 66 % (ref 18–48)
MAGNESIUM SERPL-MCNC: 1.9 MG/DL (ref 1.6–2.6)
MCH RBC QN AUTO: 27 PG (ref 27–31)
MCHC RBC AUTO-ENTMCNC: 31.2 G/DL (ref 32–36)
MCV RBC AUTO: 86 FL (ref 82–98)
METHADONE UR QL SCN>300 NG/ML: NEGATIVE
MONOCYTES # BLD AUTO: ABNORMAL K/UL (ref 0.3–1)
MONOCYTES NFR BLD: 1 % (ref 4–15)
NEUTROPHILS NFR BLD: 33 % (ref 38–73)
NITRITE UR QL STRIP: NEGATIVE
NRBC BLD-RTO: 0 /100 WBC
OPIATES UR QL SCN: NEGATIVE
PCP UR QL SCN>25 NG/ML: NEGATIVE
PH UR STRIP: 6 [PH] (ref 5–8)
PHOSPHATE SERPL-MCNC: 3.4 MG/DL (ref 2.7–4.5)
PLATELET # BLD AUTO: 89 K/UL (ref 150–450)
PLATELET BLD QL SMEAR: ABNORMAL
PMV BLD AUTO: 9.9 FL (ref 9.2–12.9)
POC MOLECULAR INFLUENZA A AGN: NEGATIVE
POC MOLECULAR INFLUENZA B AGN: NEGATIVE
POCT GLUCOSE: 81 MG/DL (ref 70–110)
POIKILOCYTOSIS BLD QL SMEAR: SLIGHT
POLYCHROMASIA BLD QL SMEAR: ABNORMAL
POTASSIUM SERPL-SCNC: 5.4 MMOL/L (ref 3.5–5.1)
PROCALCITONIN SERPL IA-MCNC: 0.04 NG/ML
PROT SERPL-MCNC: 7.8 G/DL (ref 6–8.4)
PROT UR QL STRIP: ABNORMAL
RBC # BLD AUTO: 4.41 M/UL (ref 4–5.4)
SALICYLATES SERPL-MCNC: <5 MG/DL (ref 15–30)
SARS-COV-2 RDRP RESP QL NAA+PROBE: NEGATIVE
SODIUM SERPL-SCNC: 137 MMOL/L (ref 136–145)
SP GR UR STRIP: 1.01 (ref 1–1.03)
TOXICOLOGY INFORMATION: ABNORMAL
TROPONIN I SERPL DL<=0.01 NG/ML-MCNC: 0.03 NG/ML (ref 0–0.03)
URN SPEC COLLECT METH UR: ABNORMAL
UROBILINOGEN UR STRIP-ACNC: NEGATIVE EU/DL
WBC # BLD AUTO: 48.96 K/UL (ref 3.9–12.7)

## 2023-09-16 PROCEDURE — 12000002 HC ACUTE/MED SURGE SEMI-PRIVATE ROOM

## 2023-09-16 PROCEDURE — 85007 BL SMEAR W/DIFF WBC COUNT: CPT | Performed by: PHYSICIAN ASSISTANT

## 2023-09-16 PROCEDURE — 96366 THER/PROPH/DIAG IV INF ADDON: CPT | Mod: 59

## 2023-09-16 PROCEDURE — 25500020 PHARM REV CODE 255: Performed by: EMERGENCY MEDICINE

## 2023-09-16 PROCEDURE — 99291 CRITICAL CARE FIRST HOUR: CPT

## 2023-09-16 PROCEDURE — 86140 C-REACTIVE PROTEIN: CPT | Performed by: PHYSICIAN ASSISTANT

## 2023-09-16 PROCEDURE — 36415 COLL VENOUS BLD VENIPUNCTURE: CPT | Performed by: PHYSICIAN ASSISTANT

## 2023-09-16 PROCEDURE — 85027 COMPLETE CBC AUTOMATED: CPT | Performed by: PHYSICIAN ASSISTANT

## 2023-09-16 PROCEDURE — 93005 ELECTROCARDIOGRAM TRACING: CPT

## 2023-09-16 PROCEDURE — 63600175 PHARM REV CODE 636 W HCPCS: Performed by: EMERGENCY MEDICINE

## 2023-09-16 PROCEDURE — 83735 ASSAY OF MAGNESIUM: CPT | Performed by: PHYSICIAN ASSISTANT

## 2023-09-16 PROCEDURE — 80179 DRUG ASSAY SALICYLATE: CPT | Performed by: PHYSICIAN ASSISTANT

## 2023-09-16 PROCEDURE — 99223 PR INITIAL HOSPITAL CARE,LEVL III: ICD-10-PCS | Mod: ,,,

## 2023-09-16 PROCEDURE — 84484 ASSAY OF TROPONIN QUANT: CPT | Performed by: PHYSICIAN ASSISTANT

## 2023-09-16 PROCEDURE — 85060 BLOOD SMEAR INTERPRETATION: CPT | Mod: ,,, | Performed by: PATHOLOGY

## 2023-09-16 PROCEDURE — 63600175 PHARM REV CODE 636 W HCPCS: Performed by: PHYSICIAN ASSISTANT

## 2023-09-16 PROCEDURE — 25000003 PHARM REV CODE 250: Performed by: PHYSICIAN ASSISTANT

## 2023-09-16 PROCEDURE — 96361 HYDRATE IV INFUSION ADD-ON: CPT

## 2023-09-16 PROCEDURE — 93010 ELECTROCARDIOGRAM REPORT: CPT | Mod: ,,, | Performed by: INTERNAL MEDICINE

## 2023-09-16 PROCEDURE — 99223 1ST HOSP IP/OBS HIGH 75: CPT | Mod: ,,,

## 2023-09-16 PROCEDURE — 96376 TX/PRO/DX INJ SAME DRUG ADON: CPT | Mod: 59

## 2023-09-16 PROCEDURE — 84145 PROCALCITONIN (PCT): CPT | Performed by: PHYSICIAN ASSISTANT

## 2023-09-16 PROCEDURE — 96367 TX/PROPH/DG ADDL SEQ IV INF: CPT

## 2023-09-16 PROCEDURE — 80307 DRUG TEST PRSMV CHEM ANLYZR: CPT | Performed by: PHYSICIAN ASSISTANT

## 2023-09-16 PROCEDURE — 11000001 HC ACUTE MED/SURG PRIVATE ROOM

## 2023-09-16 PROCEDURE — 81003 URINALYSIS AUTO W/O SCOPE: CPT | Performed by: PHYSICIAN ASSISTANT

## 2023-09-16 PROCEDURE — 96375 TX/PRO/DX INJ NEW DRUG ADDON: CPT

## 2023-09-16 PROCEDURE — 84100 ASSAY OF PHOSPHORUS: CPT | Performed by: PHYSICIAN ASSISTANT

## 2023-09-16 PROCEDURE — 25000003 PHARM REV CODE 250: Performed by: EMERGENCY MEDICINE

## 2023-09-16 PROCEDURE — 83880 ASSAY OF NATRIURETIC PEPTIDE: CPT | Performed by: PHYSICIAN ASSISTANT

## 2023-09-16 PROCEDURE — 93010 EKG 12-LEAD: ICD-10-PCS | Mod: ,,, | Performed by: INTERNAL MEDICINE

## 2023-09-16 PROCEDURE — 80053 COMPREHEN METABOLIC PANEL: CPT | Performed by: PHYSICIAN ASSISTANT

## 2023-09-16 PROCEDURE — 96365 THER/PROPH/DIAG IV INF INIT: CPT

## 2023-09-16 PROCEDURE — 83690 ASSAY OF LIPASE: CPT | Performed by: PHYSICIAN ASSISTANT

## 2023-09-16 PROCEDURE — 87040 BLOOD CULTURE FOR BACTERIA: CPT | Performed by: PHYSICIAN ASSISTANT

## 2023-09-16 PROCEDURE — 87635 SARS-COV-2 COVID-19 AMP PRB: CPT | Performed by: PHYSICIAN ASSISTANT

## 2023-09-16 PROCEDURE — 83605 ASSAY OF LACTIC ACID: CPT | Mod: 91 | Performed by: PHYSICIAN ASSISTANT

## 2023-09-16 PROCEDURE — 80143 DRUG ASSAY ACETAMINOPHEN: CPT | Performed by: PHYSICIAN ASSISTANT

## 2023-09-16 PROCEDURE — 85060 PATHOLOGIST REVIEW: ICD-10-PCS | Mod: ,,, | Performed by: PATHOLOGY

## 2023-09-16 RX ORDER — ONDANSETRON 2 MG/ML
4 INJECTION INTRAMUSCULAR; INTRAVENOUS
Status: COMPLETED | OUTPATIENT
Start: 2023-09-16 | End: 2023-09-16

## 2023-09-16 RX ORDER — LANOLIN ALCOHOL/MO/W.PET/CERES
100 CREAM (GRAM) TOPICAL DAILY
Status: DISCONTINUED | OUTPATIENT
Start: 2023-09-17 | End: 2023-09-17 | Stop reason: HOSPADM

## 2023-09-16 RX ORDER — DIAZEPAM 10 MG/2ML
5 INJECTION INTRAMUSCULAR EVERY 4 HOURS PRN
Status: DISCONTINUED | OUTPATIENT
Start: 2023-09-16 | End: 2023-09-16

## 2023-09-16 RX ORDER — DIAZEPAM 10 MG/2ML
5 INJECTION INTRAMUSCULAR EVERY 4 HOURS PRN
Status: DISCONTINUED | OUTPATIENT
Start: 2023-09-17 | End: 2023-09-17 | Stop reason: HOSPADM

## 2023-09-16 RX ORDER — LORAZEPAM 2 MG/ML
2 INJECTION INTRAMUSCULAR
Status: COMPLETED | OUTPATIENT
Start: 2023-09-16 | End: 2023-09-16

## 2023-09-16 RX ORDER — KETOROLAC TROMETHAMINE 30 MG/ML
10 INJECTION, SOLUTION INTRAMUSCULAR; INTRAVENOUS
Status: COMPLETED | OUTPATIENT
Start: 2023-09-16 | End: 2023-09-16

## 2023-09-16 RX ORDER — FOLIC ACID 1 MG/1
1 TABLET ORAL DAILY
Status: DISCONTINUED | OUTPATIENT
Start: 2023-09-17 | End: 2023-09-17 | Stop reason: HOSPADM

## 2023-09-16 RX ORDER — AZITHROMYCIN 250 MG/1
250 TABLET, FILM COATED ORAL DAILY
Status: DISCONTINUED | OUTPATIENT
Start: 2023-09-17 | End: 2023-09-17 | Stop reason: HOSPADM

## 2023-09-16 RX ADMIN — ONDANSETRON 4 MG: 2 INJECTION INTRAMUSCULAR; INTRAVENOUS at 05:09

## 2023-09-16 RX ADMIN — DIAZEPAM 5 MG: 5 INJECTION, SOLUTION INTRAMUSCULAR; INTRAVENOUS at 05:09

## 2023-09-16 RX ADMIN — KETOROLAC TROMETHAMINE 10 MG: 30 INJECTION, SOLUTION INTRAMUSCULAR; INTRAVENOUS at 06:09

## 2023-09-16 RX ADMIN — LORAZEPAM 2 MG: 2 INJECTION INTRAMUSCULAR; INTRAVENOUS at 06:09

## 2023-09-16 RX ADMIN — THIAMINE HYDROCHLORIDE 500 MG: 100 INJECTION, SOLUTION INTRAMUSCULAR; INTRAVENOUS at 06:09

## 2023-09-16 RX ADMIN — FOLIC ACID 1 MG: 5 INJECTION, SOLUTION INTRAMUSCULAR; INTRAVENOUS; SUBCUTANEOUS at 05:09

## 2023-09-16 RX ADMIN — SODIUM CHLORIDE 1400 ML: 9 INJECTION, SOLUTION INTRAVENOUS at 08:09

## 2023-09-16 RX ADMIN — SODIUM CHLORIDE 1000 ML: 0.9 INJECTION, SOLUTION INTRAVENOUS at 04:09

## 2023-09-16 RX ADMIN — IOHEXOL 75 ML: 350 INJECTION, SOLUTION INTRAVENOUS at 07:09

## 2023-09-16 RX ADMIN — ONDANSETRON 4 MG: 2 INJECTION INTRAMUSCULAR; INTRAVENOUS at 04:09

## 2023-09-16 RX ADMIN — AZITHROMYCIN MONOHYDRATE 500 MG: 500 INJECTION, POWDER, LYOPHILIZED, FOR SOLUTION INTRAVENOUS at 08:09

## 2023-09-16 RX ADMIN — DIAZEPAM 5 MG: 5 INJECTION, SOLUTION INTRAMUSCULAR; INTRAVENOUS at 10:09

## 2023-09-16 RX ADMIN — CEFTRIAXONE SODIUM 2 G: 2 INJECTION, POWDER, FOR SOLUTION INTRAMUSCULAR; INTRAVENOUS at 08:09

## 2023-09-16 NOTE — ED PROVIDER NOTES
Source of History:  Patient and patient's son    Chief complaint:  Alcohol Intoxication (/Recently released from [place] [facility] facility due to Alcoholism/Admits she has been drinking for 4 days straight but denies any pain)      HPI:  Earl Abdul is a 63 y.o. female with EtOH use disorder with history of prior seizures, multiple admissions for alcohol withdrawal, depression with suicide attempt in 2017, breast cancer, HTN, HLD, fibromyalgia, sarcoidosis, hypothyroidism, T2DM, CLL ( no current treatment) and COPD who is presenting to the emergency department by EMS for alcohol intoxication.  Per patient's son, states that he believes she is been binge drinking for the past 4 days.  She was recently released from rehab facility in [place] after treatment for 1 month.  Patient's son states that she lives with her  who is currently out of town.  He states that he checked on her throughout the day and she is become progressively more intoxicated.  Patient states that she had a fall down the stairs, unable to say when.  She initially denies pain and then states that she has knee pain.  She states that she drank vodka today but unable to quantify how much.  She is currently nauseous but has not vomited.  She states that she has been coughing.  She states that she is short of breath and has oxygen at home as needed.  She denies vomiting or diarrhea.  She denies recent steroid or antibiotic use.    ROS: As per HPI     Review of patient's allergies indicates:   Allergen Reactions    Lortab [hydrocodone-acetaminophen] Itching    Promethazine Itching and Other (See Comments)       PMH:  As per HPI and below:  Past Medical History:   Diagnosis Date    Anemia     Anemia     Controlled type 2 diabetes mellitus without complication, without long-term current use of insulin 11/30/2021    COPD (chronic obstructive pulmonary disease)     Depression     Diverticulitis     Fatty liver     GERD (gastroesophageal  reflux disease)     Hyperlipidemia     Hypertension     Pancreatitis     Peptic ulcer disease     Polysubstance abuse     Posterior reversible encephalopathy syndrome     Sarcoidosis of lung     Sarcoidosis of lung     over 30 yrs ago    Seizures     7/2017    Suicide attempt     Suicide ideation      Past Surgical History:   Procedure Laterality Date    APPENDECTOMY      BILATERAL MASTECTOMY Bilateral 10/29/2020    Procedure: MASTECTOMY, BILATERAL;  Surgeon: Baylee Kevin MD;  Location: [address];  Service: General;  Laterality: Bilateral;    BREAST REVISION SURGERY Bilateral 2/11/2021    Procedure: BREAST REVISION SURGERY;  Surgeon: Scottie Johnson MD;  Location: [address];  Service: Plastics;  Laterality: Bilateral;    COLONOSCOPY N/A 7/28/2017    Procedure: COLONOSCOPY;  Surgeon: Aaron Alvarado MD;  Location: [facility];  Service: Endoscopy;  Laterality: N/A;    ESOPHAGOGASTRODUODENOSCOPY  10/7/2016, 11/6/2014    2016 - gastritis, duodenitis, 2014 erosive gastritis    ESOPHAGOGASTRODUODENOSCOPY N/A 2/11/2020    Procedure: ESOPHAGOGASTRODUODENOSCOPY (EGD);  Surgeon: Fawn Garrido MD;  Location: [facility];  Service: Endoscopy;  Laterality: N/A;    ESOPHAGOGASTRODUODENOSCOPY N/A 4/19/2021    Procedure: EGD (ESOPHAGOGASTRODUODENOSCOPY);  Surgeon: Paramjit Martino MD;  Location: [facility];  Service: Endoscopy;  Laterality: N/A;    FLEXIBLE SIGMOIDOSCOPY  11/06/2014    colitis    HYSTERECTOMY      IMPLANTATION OF PERMANENT SACRAL NERVE STIMULATOR N/A 7/12/2022    Procedure: INSERTION, NEUROSTIMULATOR, PERMANENT, SACRAL;  Surgeon: Juaquin Edwards MD;  Location: [address];  Service: Urology;  Laterality: N/A;  1hr    INJECTION FOR SENTINEL NODE IDENTIFICATION Right 10/29/2020    Procedure: INJECTION, FOR SENTINEL NODE IDENTIFICATION;  Surgeon: Baylee Kevin MD;  Location: [address];  Service: General;  Laterality: Right;    INJECTION OF JOINT Right 10/10/2019    Procedure:  Injection, Joint RIGHT ILIOPSOAS BURSA/TENDON INJECTION AND RIGHT GLUTEAL TENDON INJECTION WITH STEROID AND LIDOCAINE;  Surgeon: Guillaume Rico MD;  Location: [facility];  Service: Pain Management;  Laterality: Right;  NEEDS CONSENT    INSERTION OF BREAST TISSUE EXPANDER Bilateral 10/29/2020    Procedure: INSERTION, TISSUE EXPANDER, BREAST;  Surgeon: Scottie Johnson MD;  [facility];  Service: Plastics;  Laterality: Bilateral;  Right breast: 1082 g  Left breast: 1076 g    LIPOSUCTION Bilateral 2021    Procedure: LIPOSUCTION;  Surgeon: Scottie Johnson MD;  [facility];  Service: Plastics;  Laterality: Bilateral;    mediastenoscopy      REPLACEMENT OF IMPLANT OF BREAST Bilateral 2021    Procedure: REPLACEMENT, IMPLANT, BREAST;  Surgeon: Scottie Johnson MD;  Location: [facility] OR [address] [place] [address];  Service: Plastics;  Laterality: Bilateral;    SENTINEL LYMPH NODE BIOPSY Right 10/29/2020    Procedure: BIOPSY, LYMPH NODE, SENTINEL;  Surgeon: Baylee Kevin MD;  Location: [facility] OR [address] [place] [address];  Service: General;  Laterality: Right;    TONSILLECTOMY N/A 1970    TUBAL LIGATION         Social History     Tobacco Use    Smoking status: Every Day     Current packs/day: 0.00     Average packs/day: 0.5 packs/day for 30.0 years (15.0 ttl pk-yrs)     Types: Vaping with nicotine, Cigarettes     Start date: 1991     Last attempt to quit: 2021     Years since quittin.6    Smokeless tobacco: Never   Substance Use Topics    Alcohol use: Yes     Comment: vodka daily (half a regular bottle)    Drug use: Yes     Types: Marijuana     Comment: gummies       Physical Exam:    BP (!) 164/76 (BP Location: Left arm, Patient Position: Sitting)   Pulse (!) 122   Temp 97.9 °F (36.6 °C) (Oral)   Resp 17   Wt 79.4 kg (175 lb)   SpO2 96%   BMI 28.25 kg/m²   Nursing note and vital signs reviewed.  Appearance:  Appears intoxicated.  Speech slightly slurred.  Eyes: No conjunctival injection.   Normal EOM.  Pupils normal size.  HENT: Oropharynx clear.  Atraumatic.  Chest/ Respiratory: Clear to auscultation bilaterally.  Good air movement.  No wheezes.  No rhonchi. No rales. No accessory muscle use.  Cardiovascular:  Tachycardic.  No murmurs. No gallops. No rubs.  Abdomen: Soft.  Not distended.  Nontender.  No guarding.  No rebound. Non-peritoneal.  Musculoskeletal: Good range of motion all joints.  No deformities.  Neck supple.  No meningismus.  No CTL midline tenderness, masses, step-offs or deformities.  Skin: No rashes seen.  Good turgor.  No abrasions.  No ecchymoses.  Neurologic: Motor intact.    Mental Status:  Alert, falls asleep easily     Labs that have been ordered have been independently reviewed and interpreted by myself.    Critical Care    Date/Time: 9/16/2023 9:11 PM    Performed by: Jose Mcgrath PAJOSE ROBERTO  Authorized by: Curtis Stern MD  Direct patient critical care time: 5 minutes  Additional history critical care time: 5 minutes  Ordering / reviewing critical care time: 5 minutes  Documentation critical care time: 5 minutes  Consulting other physicians critical care time: 5 minutes  Consult with family critical care time: 5 minutes  Other critical care time: 5 minutes  Total critical care time (exclusive of procedural time) : 35 minutes  Critical care was necessary to treat or prevent imminent or life-threatening deterioration of the following conditions: sepsis.  Critical care was time spent personally by me on the following activities: development of treatment plan with patient or surrogate, discussions with consultants, evaluation of patient's response to treatment, examination of patient, obtaining history from patient or surrogate, ordering and performing treatments and interventions, ordering and review of laboratory studies, ordering and review of radiographic studies, re-evaluation of patient's condition and review of old charts.           I decided to obtain the  patient's medical records.  Summary of Medical Records:  Last hospital admission on 07/22 for alcohol withdrawal.    MDM/ Differential Dx:    63 y.o. female with EtOH use disorder with history of prior seizures, multiple admissions for alcohol withdrawal, HTN, HLD, fibromyalgia, sarcoidosis, hypothyroidism, T2DM, CLL and COPD who is presenting to the emergency department after her son called EMS for acute alcohol intoxication.  She was released from rehab facility in [place] last weekend.  Patient is afebrile, nontoxic appearing hemodynamically stable.  She is clinically intoxicated.    Differential diagnosis includes acute alcohol intoxication, metabolic derangement, encephalopathy, alcohol withdrawal, dehydration.  ED Course as of 09/16/23 2226   Sat Sep 16, 2023   1708 CBC with leukocytosis of approximately 49,000 with chronic thrombocytopenia.  Patient had similar elevated white count during last admission for alcohol withdrawal.  Will add on infectious/septic workup. [AG]   1714 Chemistry with potassium slightly elevated, 5.4 but is hemolyzed.  Metabolic acidosis noted, bicarb of 16 with anion gap of 24.  Glucose 57.  Will give juice now and folic acid and thiamine infusion with dextrose ordered. [AG]   1817 EKG  Sinus tachycardia at a rate of 108 beats per minute.  No STEMI.  No ectopy.  No acute changes from previous EKG from July 2023. [AG]   1839 Lactic acid, plasma(!) [AG]   1901 Troponin I(!)  Troponin slightly elevated without history of previously elevated troponin. [AG]   2039 CTA chest revealed:  No appreciable pulmonary thromboemboli to the level of the distal segmental branches.  No right ventricular strain.     Lung base opacities and atelectasis possibly related to nonspecific pneumonitis in this patient with history of sarcoidosis.  Two new nodular 4 and 5 mm foci in the medial right lung base may represent inflammatory/infectious etiology however follow-up is suggested in this patient with  history of breast carcinoma and lymphoma    Will cover for CAP [AG]   2225 Repeat lactic acid improved to 1.4.    Case discussed with Arnaldo clemente NP- will admit to inpatient service.  [AG]      ED Course User Index  [AG] Jose Mcgrath PA-C          This note was created using [employer] Fluency Direct. There may be typographical errors secondary to dictation.       Diagnostic Impression:    1. Acute alcoholic intoxication with complication    2. Tachycardia    3. Lactic acidosis    4. Metabolic acidosis                   Jose Mcgrath, CHUY  09/16/23 2227

## 2023-09-17 ENCOUNTER — HOSPITAL ENCOUNTER (EMERGENCY)
Facility: HOSPITAL | Age: 63
Discharge: PSYCHIATRIC OR SUBSTANCE USE FACILITY | End: 2023-09-18
Attending: STUDENT IN AN ORGANIZED HEALTH CARE EDUCATION/TRAINING PROGRAM
Payer: COMMERCIAL

## 2023-09-17 VITALS
SYSTOLIC BLOOD PRESSURE: 162 MMHG | OXYGEN SATURATION: 95 % | WEIGHT: 174.38 LBS | TEMPERATURE: 98 F | BODY MASS INDEX: 28.03 KG/M2 | HEART RATE: 76 BPM | DIASTOLIC BLOOD PRESSURE: 82 MMHG | RESPIRATION RATE: 20 BRPM | HEIGHT: 66 IN

## 2023-09-17 DIAGNOSIS — R05.9 COUGH: ICD-10-CM

## 2023-09-17 DIAGNOSIS — T14.91XA SUICIDE ATTEMPT: Primary | ICD-10-CM

## 2023-09-17 PROBLEM — F10.929 ACUTE ALCOHOLIC INTOXICATION WITH COMPLICATION: Status: ACTIVE | Noted: 2023-09-17

## 2023-09-17 PROBLEM — J96.12 CHRONIC RESPIRATORY FAILURE WITH HYPOXIA AND HYPERCAPNIA: Status: ACTIVE | Noted: 2022-01-08

## 2023-09-17 PROBLEM — E87.20 METABOLIC ACIDOSIS: Status: ACTIVE | Noted: 2023-09-17

## 2023-09-17 PROBLEM — E11.649 TYPE 2 DIABETES MELLITUS WITH HYPOGLYCEMIA: Status: ACTIVE | Noted: 2021-11-30

## 2023-09-17 PROBLEM — J96.11 CHRONIC RESPIRATORY FAILURE WITH HYPOXIA: Chronic | Status: RESOLVED | Noted: 2022-01-08 | Resolved: 2023-09-17

## 2023-09-17 PROBLEM — F10.929 ACUTE ALCOHOLIC INTOXICATION WITH COMPLICATION: Status: RESOLVED | Noted: 2023-09-17 | Resolved: 2023-09-17

## 2023-09-17 PROBLEM — E87.20 LACTIC ACIDOSIS: Status: ACTIVE | Noted: 2023-09-17

## 2023-09-17 PROBLEM — J96.11 CHRONIC RESPIRATORY FAILURE WITH HYPOXIA AND HYPERCAPNIA: Status: ACTIVE | Noted: 2022-01-08

## 2023-09-17 PROBLEM — E87.29 ALCOHOLIC KETOACIDOSIS: Status: RESOLVED | Noted: 2022-04-29 | Resolved: 2023-09-17

## 2023-09-17 LAB
ALBUMIN SERPL BCP-MCNC: 4.4 G/DL (ref 3.5–5.2)
ALP SERPL-CCNC: 72 U/L (ref 55–135)
ALT SERPL W/O P-5'-P-CCNC: 33 U/L (ref 10–44)
AMPHET+METHAMPHET UR QL: NEGATIVE
ANION GAP SERPL CALC-SCNC: 11 MMOL/L (ref 8–16)
ANION GAP SERPL CALC-SCNC: 12 MMOL/L (ref 8–16)
ANION GAP SERPL CALC-SCNC: 14 MMOL/L (ref 8–16)
APAP SERPL-MCNC: <3 UG/ML (ref 10–20)
AST SERPL-CCNC: 43 U/L (ref 10–40)
BARBITURATES UR QL SCN>200 NG/ML: NEGATIVE
BENZODIAZ UR QL SCN>200 NG/ML: NEGATIVE
BILIRUB SERPL-MCNC: 0.5 MG/DL (ref 0.1–1)
BILIRUB UR QL STRIP: NEGATIVE
BUN SERPL-MCNC: 13 MG/DL (ref 8–23)
BUN SERPL-MCNC: 13 MG/DL (ref 8–23)
BUN SERPL-MCNC: 8 MG/DL (ref 8–23)
BZE UR QL SCN: NEGATIVE
CALCIUM SERPL-MCNC: 8.1 MG/DL (ref 8.7–10.5)
CALCIUM SERPL-MCNC: 8.3 MG/DL (ref 8.7–10.5)
CALCIUM SERPL-MCNC: 9.3 MG/DL (ref 8.7–10.5)
CANNABINOIDS UR QL SCN: NEGATIVE
CHLORIDE SERPL-SCNC: 105 MMOL/L (ref 95–110)
CHLORIDE SERPL-SCNC: 105 MMOL/L (ref 95–110)
CHLORIDE SERPL-SCNC: 106 MMOL/L (ref 95–110)
CLARITY UR REFRACT.AUTO: CLEAR
CO2 SERPL-SCNC: 21 MMOL/L (ref 23–29)
CO2 SERPL-SCNC: 22 MMOL/L (ref 23–29)
CO2 SERPL-SCNC: 24 MMOL/L (ref 23–29)
COLOR UR AUTO: NORMAL
CREAT SERPL-MCNC: 0.8 MG/DL (ref 0.5–1.4)
CREAT SERPL-MCNC: 0.8 MG/DL (ref 0.5–1.4)
CREAT SERPL-MCNC: 0.9 MG/DL (ref 0.5–1.4)
CREAT UR-MCNC: 18 MG/DL (ref 15–325)
ERYTHROCYTE [DISTWIDTH] IN BLOOD BY AUTOMATED COUNT: 15.6 % (ref 11.5–14.5)
EST. GFR  (NO RACE VARIABLE): >60 ML/MIN/1.73 M^2
ETHANOL SERPL-MCNC: 209 MG/DL
GLUCOSE SERPL-MCNC: 142 MG/DL (ref 70–110)
GLUCOSE SERPL-MCNC: 84 MG/DL (ref 70–110)
GLUCOSE SERPL-MCNC: 91 MG/DL (ref 70–110)
GLUCOSE UR QL STRIP: NEGATIVE
HCT VFR BLD AUTO: 32.7 % (ref 37–48.5)
HGB BLD-MCNC: 10 G/DL (ref 12–16)
HGB UR QL STRIP: NEGATIVE
INR PPP: 1 (ref 0.8–1.2)
KETONES UR QL STRIP: NEGATIVE
LEUKOCYTE ESTERASE UR QL STRIP: NEGATIVE
MCH RBC QN AUTO: 27.2 PG (ref 27–31)
MCHC RBC AUTO-ENTMCNC: 30.6 G/DL (ref 32–36)
MCV RBC AUTO: 89 FL (ref 82–98)
METHADONE UR QL SCN>300 NG/ML: NEGATIVE
NITRITE UR QL STRIP: NEGATIVE
OPIATES UR QL SCN: NEGATIVE
PCP UR QL SCN>25 NG/ML: NEGATIVE
PH UR STRIP: 7 [PH] (ref 5–8)
PLATELET # BLD AUTO: 114 K/UL (ref 150–450)
PMV BLD AUTO: 9.5 FL (ref 9.2–12.9)
POCT GLUCOSE: 66 MG/DL (ref 70–110)
POCT GLUCOSE: 87 MG/DL (ref 70–110)
POCT GLUCOSE: 97 MG/DL (ref 70–110)
POTASSIUM SERPL-SCNC: 3.5 MMOL/L (ref 3.5–5.1)
POTASSIUM SERPL-SCNC: 3.7 MMOL/L (ref 3.5–5.1)
POTASSIUM SERPL-SCNC: 4 MMOL/L (ref 3.5–5.1)
PROT SERPL-MCNC: 7.5 G/DL (ref 6–8.4)
PROT UR QL STRIP: NEGATIVE
PROTHROMBIN TIME: 10.7 SEC (ref 9–12.5)
RBC # BLD AUTO: 3.68 M/UL (ref 4–5.4)
SALICYLATES SERPL-MCNC: <5 MG/DL (ref 15–30)
SODIUM SERPL-SCNC: 138 MMOL/L (ref 136–145)
SODIUM SERPL-SCNC: 139 MMOL/L (ref 136–145)
SODIUM SERPL-SCNC: 143 MMOL/L (ref 136–145)
SP GR UR STRIP: 1 (ref 1–1.03)
TOXICOLOGY INFORMATION: NORMAL
TROPONIN I SERPL DL<=0.01 NG/ML-MCNC: <0.006 NG/ML (ref 0–0.03)
TSH SERPL DL<=0.005 MIU/L-ACNC: 0.35 UIU/ML (ref 0.4–4)
URN SPEC COLLECT METH UR: NORMAL
WBC # BLD AUTO: 17.3 K/UL (ref 3.9–12.7)

## 2023-09-17 PROCEDURE — 81003 URINALYSIS AUTO W/O SCOPE: CPT | Mod: 59 | Performed by: EMERGENCY MEDICINE

## 2023-09-17 PROCEDURE — 87040 BLOOD CULTURE FOR BACTERIA: CPT | Performed by: PHYSICIAN ASSISTANT

## 2023-09-17 PROCEDURE — 84443 ASSAY THYROID STIM HORMONE: CPT | Performed by: EMERGENCY MEDICINE

## 2023-09-17 PROCEDURE — 85007 BL SMEAR W/DIFF WBC COUNT: CPT | Performed by: EMERGENCY MEDICINE

## 2023-09-17 PROCEDURE — 25000003 PHARM REV CODE 250: Performed by: STUDENT IN AN ORGANIZED HEALTH CARE EDUCATION/TRAINING PROGRAM

## 2023-09-17 PROCEDURE — 80143 DRUG ASSAY ACETAMINOPHEN: CPT | Performed by: EMERGENCY MEDICINE

## 2023-09-17 PROCEDURE — 25000003 PHARM REV CODE 250

## 2023-09-17 PROCEDURE — 84484 ASSAY OF TROPONIN QUANT: CPT

## 2023-09-17 PROCEDURE — 80048 BASIC METABOLIC PNL TOTAL CA: CPT

## 2023-09-17 PROCEDURE — 82077 ASSAY SPEC XCP UR&BREATH IA: CPT | Performed by: EMERGENCY MEDICINE

## 2023-09-17 PROCEDURE — 80179 DRUG ASSAY SALICYLATE: CPT | Performed by: EMERGENCY MEDICINE

## 2023-09-17 PROCEDURE — 99285 EMERGENCY DEPT VISIT HI MDM: CPT | Mod: 25

## 2023-09-17 PROCEDURE — 99233 SBSQ HOSP IP/OBS HIGH 50: CPT | Mod: ,,, | Performed by: STUDENT IN AN ORGANIZED HEALTH CARE EDUCATION/TRAINING PROGRAM

## 2023-09-17 PROCEDURE — 85027 COMPLETE CBC AUTOMATED: CPT

## 2023-09-17 PROCEDURE — 80307 DRUG TEST PRSMV CHEM ANLYZR: CPT | Performed by: EMERGENCY MEDICINE

## 2023-09-17 PROCEDURE — 63600175 PHARM REV CODE 636 W HCPCS

## 2023-09-17 PROCEDURE — 36415 COLL VENOUS BLD VENIPUNCTURE: CPT | Performed by: PHYSICIAN ASSISTANT

## 2023-09-17 PROCEDURE — 63600175 PHARM REV CODE 636 W HCPCS: Performed by: STUDENT IN AN ORGANIZED HEALTH CARE EDUCATION/TRAINING PROGRAM

## 2023-09-17 PROCEDURE — 80053 COMPREHEN METABOLIC PANEL: CPT | Performed by: EMERGENCY MEDICINE

## 2023-09-17 PROCEDURE — 84439 ASSAY OF FREE THYROXINE: CPT | Performed by: EMERGENCY MEDICINE

## 2023-09-17 PROCEDURE — 97165 OT EVAL LOW COMPLEX 30 MIN: CPT

## 2023-09-17 PROCEDURE — 63700000 PHARM REV CODE 250 ALT 637 W/O HCPCS

## 2023-09-17 PROCEDURE — 99233 PR SUBSEQUENT HOSPITAL CARE,LEVL III: ICD-10-PCS | Mod: ,,, | Performed by: STUDENT IN AN ORGANIZED HEALTH CARE EDUCATION/TRAINING PROGRAM

## 2023-09-17 PROCEDURE — 36415 COLL VENOUS BLD VENIPUNCTURE: CPT

## 2023-09-17 PROCEDURE — 85027 COMPLETE CBC AUTOMATED: CPT | Mod: 91 | Performed by: EMERGENCY MEDICINE

## 2023-09-17 PROCEDURE — 85610 PROTHROMBIN TIME: CPT

## 2023-09-17 PROCEDURE — 97162 PT EVAL MOD COMPLEX 30 MIN: CPT

## 2023-09-17 PROCEDURE — 94761 N-INVAS EAR/PLS OXIMETRY MLT: CPT

## 2023-09-17 RX ORDER — BUTALBITAL, ACETAMINOPHEN AND CAFFEINE 50; 325; 40 MG/1; MG/1; MG/1
1 TABLET ORAL EVERY 4 HOURS PRN
Status: ON HOLD | COMMUNITY
End: 2023-11-24

## 2023-09-17 RX ORDER — ACETAMINOPHEN 500 MG
1000 TABLET ORAL EVERY 8 HOURS PRN
Status: DISCONTINUED | OUTPATIENT
Start: 2023-09-17 | End: 2023-09-17 | Stop reason: HOSPADM

## 2023-09-17 RX ORDER — MAG HYDROX/ALUMINUM HYD/SIMETH 200-200-20
30 SUSPENSION, ORAL (FINAL DOSE FORM) ORAL 4 TIMES DAILY PRN
Status: DISCONTINUED | OUTPATIENT
Start: 2023-09-17 | End: 2023-09-17 | Stop reason: HOSPADM

## 2023-09-17 RX ORDER — ESCITALOPRAM OXALATE 10 MG/1
10 TABLET ORAL DAILY
Status: ON HOLD | COMMUNITY
Start: 2023-08-21 | End: 2023-11-29 | Stop reason: SDUPTHER

## 2023-09-17 RX ORDER — TALC
6 POWDER (GRAM) TOPICAL NIGHTLY PRN
Status: DISCONTINUED | OUTPATIENT
Start: 2023-09-17 | End: 2023-09-17 | Stop reason: HOSPADM

## 2023-09-17 RX ORDER — ARIPIPRAZOLE 5 MG/1
5 TABLET ORAL DAILY
Status: ON HOLD | COMMUNITY
End: 2023-11-29 | Stop reason: HOSPADM

## 2023-09-17 RX ORDER — TRAZODONE HYDROCHLORIDE 300 MG/1
1 TABLET ORAL NIGHTLY
Status: ON HOLD | COMMUNITY
End: 2023-11-06 | Stop reason: SDUPTHER

## 2023-09-17 RX ORDER — IPRATROPIUM BROMIDE AND ALBUTEROL SULFATE 2.5; .5 MG/3ML; MG/3ML
3 SOLUTION RESPIRATORY (INHALATION)
Status: DISCONTINUED | OUTPATIENT
Start: 2023-09-17 | End: 2023-09-17

## 2023-09-17 RX ORDER — SIMETHICONE 80 MG
1 TABLET,CHEWABLE ORAL 4 TIMES DAILY PRN
Status: DISCONTINUED | OUTPATIENT
Start: 2023-09-17 | End: 2023-09-17 | Stop reason: HOSPADM

## 2023-09-17 RX ORDER — METOPROLOL SUCCINATE 50 MG/1
TABLET, EXTENDED RELEASE ORAL
Status: ON HOLD | COMMUNITY
End: 2023-11-24

## 2023-09-17 RX ORDER — HYDRALAZINE HYDROCHLORIDE 20 MG/ML
10 INJECTION INTRAMUSCULAR; INTRAVENOUS EVERY 6 HOURS PRN
Status: DISCONTINUED | OUTPATIENT
Start: 2023-09-17 | End: 2023-09-17 | Stop reason: HOSPADM

## 2023-09-17 RX ORDER — AMOXICILLIN 250 MG
2 CAPSULE ORAL DAILY PRN
Status: DISCONTINUED | OUTPATIENT
Start: 2023-09-17 | End: 2023-09-17 | Stop reason: HOSPADM

## 2023-09-17 RX ORDER — ATORVASTATIN CALCIUM 10 MG/1
1 TABLET, FILM COATED ORAL DAILY
Status: ON HOLD | COMMUNITY
End: 2023-11-24

## 2023-09-17 RX ORDER — ENOXAPARIN SODIUM 100 MG/ML
40 INJECTION SUBCUTANEOUS EVERY 24 HOURS
Status: DISCONTINUED | OUTPATIENT
Start: 2023-09-17 | End: 2023-09-17 | Stop reason: HOSPADM

## 2023-09-17 RX ORDER — METOPROLOL SUCCINATE 50 MG/1
50 TABLET, EXTENDED RELEASE ORAL DAILY
Status: DISCONTINUED | OUTPATIENT
Start: 2023-09-17 | End: 2023-09-17 | Stop reason: HOSPADM

## 2023-09-17 RX ORDER — CLONIDINE HYDROCHLORIDE 0.1 MG/1
TABLET ORAL
Status: ON HOLD | COMMUNITY
Start: 2023-08-19 | End: 2023-11-24

## 2023-09-17 RX ORDER — IPRATROPIUM BROMIDE 17 UG/1
2 AEROSOL, METERED RESPIRATORY (INHALATION) EVERY 6 HOURS PRN
Status: ON HOLD | COMMUNITY
End: 2023-11-24

## 2023-09-17 RX ORDER — HYDROCHLOROTHIAZIDE 25 MG/1
TABLET ORAL
Status: ON HOLD | COMMUNITY
End: 2023-11-24

## 2023-09-17 RX ORDER — ARIPIPRAZOLE 2 MG/1
1 TABLET ORAL DAILY
Status: ON HOLD | COMMUNITY
End: 2023-11-06 | Stop reason: HOSPADM

## 2023-09-17 RX ORDER — ACETAMINOPHEN 500 MG
TABLET ORAL
Status: ON HOLD | COMMUNITY
End: 2023-11-24

## 2023-09-17 RX ORDER — GLUCAGON 1 MG
1 KIT INJECTION
Status: DISCONTINUED | OUTPATIENT
Start: 2023-09-17 | End: 2023-09-17 | Stop reason: HOSPADM

## 2023-09-17 RX ORDER — HYDROXYZINE PAMOATE 25 MG/1
25 CAPSULE ORAL EVERY 6 HOURS PRN
Status: ON HOLD | COMMUNITY
End: 2023-11-29 | Stop reason: HOSPADM

## 2023-09-17 RX ORDER — ANASTROZOLE 1 MG/1
TABLET ORAL
Status: ON HOLD | COMMUNITY
End: 2023-11-24

## 2023-09-17 RX ORDER — INSULIN ASPART 100 [IU]/ML
0-5 INJECTION, SOLUTION INTRAVENOUS; SUBCUTANEOUS
Status: DISCONTINUED | OUTPATIENT
Start: 2023-09-17 | End: 2023-09-17 | Stop reason: HOSPADM

## 2023-09-17 RX ORDER — DOXEPIN HYDROCHLORIDE 150 MG/1
150 CAPSULE ORAL NIGHTLY
Status: ON HOLD | COMMUNITY
Start: 2023-09-07 | End: 2023-11-29 | Stop reason: HOSPADM

## 2023-09-17 RX ORDER — PROCHLORPERAZINE EDISYLATE 5 MG/ML
5 INJECTION INTRAMUSCULAR; INTRAVENOUS EVERY 8 HOURS PRN
Status: DISCONTINUED | OUTPATIENT
Start: 2023-09-17 | End: 2023-09-17 | Stop reason: HOSPADM

## 2023-09-17 RX ORDER — SODIUM CHLORIDE 0.9 % (FLUSH) 0.9 %
10 SYRINGE (ML) INJECTION EVERY 12 HOURS PRN
Status: DISCONTINUED | OUTPATIENT
Start: 2023-09-17 | End: 2023-09-17 | Stop reason: HOSPADM

## 2023-09-17 RX ORDER — AMOXICILLIN AND CLAVULANATE POTASSIUM 875; 125 MG/1; MG/1
1 TABLET, FILM COATED ORAL 2 TIMES DAILY
Qty: 6 TABLET | Refills: 0 | Status: SHIPPED | OUTPATIENT
Start: 2023-09-17 | End: 2023-09-20

## 2023-09-17 RX ORDER — ONDANSETRON 2 MG/ML
4 INJECTION INTRAMUSCULAR; INTRAVENOUS EVERY 8 HOURS
Status: DISCONTINUED | OUTPATIENT
Start: 2023-09-17 | End: 2023-09-17 | Stop reason: HOSPADM

## 2023-09-17 RX ORDER — MIRTAZAPINE 30 MG/1
TABLET, FILM COATED ORAL
Status: ON HOLD | COMMUNITY
End: 2023-11-29 | Stop reason: HOSPADM

## 2023-09-17 RX ORDER — KETOROLAC TROMETHAMINE 30 MG/ML
15 INJECTION, SOLUTION INTRAMUSCULAR; INTRAVENOUS EVERY 8 HOURS PRN
Status: DISCONTINUED | OUTPATIENT
Start: 2023-09-17 | End: 2023-09-17 | Stop reason: HOSPADM

## 2023-09-17 RX ORDER — PROPRANOLOL HYDROCHLORIDE 20 MG/1
20 TABLET ORAL 2 TIMES DAILY
Status: ON HOLD | COMMUNITY
Start: 2023-08-14 | End: 2023-11-29 | Stop reason: HOSPADM

## 2023-09-17 RX ORDER — LOSARTAN POTASSIUM 25 MG/1
1 TABLET ORAL DAILY
Status: ON HOLD | COMMUNITY
End: 2023-11-24

## 2023-09-17 RX ORDER — LETROZOLE 2.5 MG/1
TABLET, FILM COATED ORAL
Status: ON HOLD | COMMUNITY
End: 2023-11-24

## 2023-09-17 RX ORDER — ARIPIPRAZOLE 5 MG/1
1 TABLET ORAL EVERY MORNING
Status: ON HOLD | COMMUNITY
End: 2023-11-06 | Stop reason: HOSPADM

## 2023-09-17 RX ORDER — FLUOXETINE HYDROCHLORIDE 20 MG/1
3 CAPSULE ORAL DAILY
Status: ON HOLD | COMMUNITY
End: 2023-11-24

## 2023-09-17 RX ORDER — NABUMETONE 500 MG/1
500 TABLET, FILM COATED ORAL 2 TIMES DAILY
Status: ON HOLD | COMMUNITY
Start: 2023-08-29 | End: 2023-11-24

## 2023-09-17 RX ORDER — IBUPROFEN 200 MG
16 TABLET ORAL
Status: DISCONTINUED | OUTPATIENT
Start: 2023-09-17 | End: 2023-09-17 | Stop reason: HOSPADM

## 2023-09-17 RX ORDER — IBUPROFEN 200 MG
24 TABLET ORAL
Status: DISCONTINUED | OUTPATIENT
Start: 2023-09-17 | End: 2023-09-17 | Stop reason: HOSPADM

## 2023-09-17 RX ORDER — BUSPIRONE HYDROCHLORIDE 15 MG/1
TABLET ORAL
Status: ON HOLD | COMMUNITY
End: 2023-11-24

## 2023-09-17 RX ORDER — IPRATROPIUM BROMIDE AND ALBUTEROL SULFATE 2.5; .5 MG/3ML; MG/3ML
3 SOLUTION RESPIRATORY (INHALATION) EVERY 4 HOURS PRN
Status: DISCONTINUED | OUTPATIENT
Start: 2023-09-17 | End: 2023-09-17 | Stop reason: HOSPADM

## 2023-09-17 RX ORDER — LOPERAMIDE HYDROCHLORIDE 2 MG/1
2 CAPSULE ORAL 4 TIMES DAILY PRN
Status: DISCONTINUED | OUTPATIENT
Start: 2023-09-17 | End: 2023-09-17 | Stop reason: HOSPADM

## 2023-09-17 RX ORDER — LOSARTAN POTASSIUM 25 MG/1
25 TABLET ORAL DAILY
Status: DISCONTINUED | OUTPATIENT
Start: 2023-09-17 | End: 2023-09-17 | Stop reason: HOSPADM

## 2023-09-17 RX ORDER — HYDROCHLOROTHIAZIDE 25 MG/1
25 TABLET ORAL DAILY
Status: DISCONTINUED | OUTPATIENT
Start: 2023-09-17 | End: 2023-09-17 | Stop reason: HOSPADM

## 2023-09-17 RX ADMIN — METOPROLOL SUCCINATE 50 MG: 50 TABLET, EXTENDED RELEASE ORAL at 11:09

## 2023-09-17 RX ADMIN — PROCHLORPERAZINE EDISYLATE 5 MG: 5 INJECTION INTRAMUSCULAR; INTRAVENOUS at 04:09

## 2023-09-17 RX ADMIN — DIAZEPAM 5 MG: 5 INJECTION, SOLUTION INTRAMUSCULAR; INTRAVENOUS at 04:09

## 2023-09-17 RX ADMIN — ONDANSETRON 4 MG: 2 INJECTION INTRAMUSCULAR; INTRAVENOUS at 05:09

## 2023-09-17 RX ADMIN — HYDROCHLOROTHIAZIDE 25 MG: 25 TABLET ORAL at 08:09

## 2023-09-17 RX ADMIN — AZITHROMYCIN MONOHYDRATE 250 MG: 250 TABLET ORAL at 08:09

## 2023-09-17 RX ADMIN — LOSARTAN POTASSIUM 25 MG: 25 TABLET, FILM COATED ORAL at 08:09

## 2023-09-17 RX ADMIN — ONDANSETRON 4 MG: 2 INJECTION INTRAMUSCULAR; INTRAVENOUS at 12:09

## 2023-09-17 RX ADMIN — FOLIC ACID 1 MG: 1 TABLET ORAL at 08:09

## 2023-09-17 RX ADMIN — ENOXAPARIN SODIUM 40 MG: 40 INJECTION SUBCUTANEOUS at 12:09

## 2023-09-17 RX ADMIN — Medication 100 MG: at 08:09

## 2023-09-17 RX ADMIN — THERA TABS 1 TABLET: TAB at 08:09

## 2023-09-17 RX ADMIN — SODIUM CHLORIDE, POTASSIUM CHLORIDE, SODIUM LACTATE AND CALCIUM CHLORIDE 500 ML: 600; 310; 30; 20 INJECTION, SOLUTION INTRAVENOUS at 08:09

## 2023-09-17 RX ADMIN — ACETAMINOPHEN 1000 MG: 500 TABLET ORAL at 06:09

## 2023-09-17 NOTE — ASSESSMENT & PLAN NOTE
Patient's FSGs are uncontrolled due to hypoglycemia on current medication regimen.  Last A1c reviewed-   Lab Results   Component Value Date    HGBA1C 4.7 06/16/2023     Most recent fingerstick glucose reviewed-   Recent Labs   Lab 09/16/23  2131 09/17/23  0039   POCTGLUCOSE 81 66*     Current correctional scale  Low  Maintain anti-hyperglycemic dose as follows-   Antihyperglycemics (From admission, onward)    Start     Stop Route Frequency Ordered    09/17/23 0103  insulin aspart U-100 pen 0-5 Units         -- SubQ Before meals & nightly PRN 09/17/23 0006        Home meds:  Need to check with patient's son in the morning

## 2023-09-17 NOTE — PLAN OF CARE
Problem: Physical Therapy  Goal: Physical Therapy Goal  Outcome: Met     Patient evaluated and no acute care PT goals identified. Patient with grossly stable gait despite slight tremors to BUE and RLE. She reports she's at her baseline mobility and only complains of headache pain. Denies recent falls.    During this hospitalization, patient does not require further acute PT services.  Please re-consult if situation changes.  Recommendation for d/c to home with OP PT if having falls at home.

## 2023-09-17 NOTE — NURSING
Spoke to pt concerning discharge orders. Pt stated that she will not need transportation because her son is coming to pick her up. Denies distress. Gave pt printed discharge orders.

## 2023-09-17 NOTE — ASSESSMENT & PLAN NOTE
Alcoholic intoxication   Risk for alcohol withdrawal   Alcoholic ketoacidosis   Hyperkalemia  Hx of alcohol use disorder with hx of prior seizures, alcohol withdrawal, depression with suicide attempt 2017   - presenting to ED by EMS for alcohol intoxication. Per patient's son, patient has been binge drinking for the past 4 days after recent alcohol rehab in [place] for 1 month, patient reports half a pint of vodka, last drink today  - Patient's son reports that he checked on her throughout the day and she had gotten progressively intoxicated.    - Patient also reports fall (unknown when) with knee pain to ED provider, reports that she fell down the stairs with no HI and no other injuries Patient also reports a cough with SOB.   - H/H 11.9/38.1 (baseline), Plt 89 (baseline thrombocytopenia), Alb WNL. K 5.4, Na 137, AG 24, Cr 0.8.  UDS positive for alcohol only. UA with ketonuria. EKG with sinus tachycardia.   - In ED: Patient was given 2.4L NaCl 0.9% fluid bolus in total, thiamine 500mg IV, folic acid 1mg IV, ondansetron 4mg IV x 2, ketoroalc 10mg IV x 1, lorazepam 2mg IV, diazepam 5mg IV x 2.     Plan:  - Trend CMP  - Continue thiamine 100mg PO daily, folic acid 1mg PO daily, MVI PO daily  - CIWA Q4  - Diazepam 5mg IV Q4 PRN if CIWA >15

## 2023-09-17 NOTE — PROGRESS NOTES
[facility] [address] Medicine  Progress Note    Patient Name: Earl Abdul  MRN: [id number]  Patient Class: IP- Inpatient   Admission Date: 9/16/2023  Length of Stay: 1 days  Attending Physician: Kalyan Fisher MD  Primary Care Provider: Andrew Rodriguez MD        Subjective:     Principal Problem:Alcohol use disorder, severe, dependence        HPI:  Patient is a difficult historian, doesn't want to engage much, doesn't remember her meds, alert and oriented.   Per NP Arnaldo -     According to ED provider note:  Earl Abdul is a 63 year old lady with hx of alcohol use disorder with hx of prior seizures, alcohol withdrawal, depression with suicide attempt 2017, non-insulin dependent DM, COPD, GERD, fibromyalgia, sarcoidosis, breast Ca, CLL (no current treatment), HTN, HLD, hypothyroidism presenting to ED by EMS for alcohol intoxication. Per patient's son, patient has been binge drinking for the past 4 days after recent alcohol rehab in [place] for 1 month. Patient's son reports that he checked on her throughout the day and she had gotten progressively intoxicated. Patient reports drinking vodka today but unable to say how much. Patient also reports fall (unknown when) with knee pain. Patient also reports a cough with SOB.     Afebrile, , RR 17, /76, Pox 96% on RA, then placed on 2L/min NC for slight desaturation to 95%. Covid and Flu negative. Leukocytosis 48K, lactate 3.1, procal normal. H/H 11.9/38.1 (baseline), Plt 89 (baseline thrombocytopenia), Alb WNL. K 5.4, Na 137, AG 24, Cr 0.8, Glucose 57. BNP WNL, Trop I 0.033. UDS positive for alcohol only. UA with no nitrites, leuks. EKG with sinus tachycardia. CXR with no acute abnormality. CTA Chest with no PE, but some lung base opacities and atelectasis possibly related to nonspecific pneumonitis. See full report. In ED: Patient was given 2.4L NaCl 0.9% fluid bolus in total, thiamine 500mg IV, folic acid 1mg IV,  "ondansetron 4mg IV x 2, ketoroalc 10mg IV x 1, lorazepam 2mg IV, diazepam 5mg IV x 2. Ceftriaxone 2g IV and azithromycin 500mg IV was also given to cover for pneumonia.     Patient replies "alcohol" when asked why she is in the hospital. Difficult historian, does not volunteer information, gives "yes, no" answers. Says no to SOB and cough but Pox 93% and was obviously coughing. Probed about fall, she reports that she fell down the stairs, did not know when, declined HI, declined any other injuries (patient ambulated to bathroom". Denies fever, chills, dysuria, abdominal pain, nausea, vomiting. Has resting tremors.     Patient is admitted to Hospital Medicine for management of alcohol intoxication, risk for withdrawal and sepsis likely due to respiratory source.       Overview/Hospital Course:  Pt doing well this AM on room air. Improvement in WBC could be 2/2 to fluids or abx. With neg procal and no infectious sx like fever less likely to be leukocytosis in setting of infection, never the less on admission she did have reported cough and tachycardia along with leukocytosis so will continue antibiotics for now.     Regarding fall down stairs, pt is unclear about the details and if she hit head or not so will order CT brain.     Pt is unclear about her home meds - I tried to call her pharmacy but they are closed on a Sunday. Will reattempt tomorrow. In the meantime, will start some blood pressure medications listed on home meds on the chart as she is hypertensive.    In terms of alcohol, she has been sober for 60 days after going to a rehab and started drinking vodka heavily 4 days because she was lonely and sad because her  left on a business trip. Her son lives in the area and she asked him to take her to hospital because she felt she was drinking too much. She thinks she can abstain when she goes home and does not want to go back to a rehab at this time. On physical exam she is not currently withdrawing. "     Her anion gap and acidosis has improved with the fluids. Will continue to fluid resus her, monitor for signs of withdrawal (though with 4 day drinking hx I doubt she will withdraw), be eval by PT and OT and plan for d/c tomrorow if she remains stable.       Interval History: Pt reports he feels better. Labs have also improved after fluids. Cont fluid resus and CT brain for fall down stairs. PT and OT eval.     Review of Systems   Constitutional:  Positive for activity change (consuming alcohol for past 4 days). Negative for fever.   Respiratory:  Negative for cough.    Cardiovascular:  Negative for chest pain and leg swelling.   Gastrointestinal:  Negative for abdominal distention, abdominal pain and diarrhea.   Genitourinary:  Negative for dysuria.   Skin:  Negative for rash.   Psychiatric/Behavioral:  Negative for agitation and confusion.      Objective:     Vital Signs (Most Recent):  Temp: 98.2 °F (36.8 °C) (09/17/23 0721)  Pulse: 92 (09/17/23 0721)  Resp: 20 (09/17/23 0721)  BP: (!) 171/76 (09/17/23 0721)  SpO2: (!) 94 % (09/17/23 0800) Vital Signs (24h Range):  Temp:  [97.9 °F (36.6 °C)-98.9 °F (37.2 °C)] 98.2 °F (36.8 °C)  Pulse:  [] 92  Resp:  [16-20] 20  SpO2:  [93 %-100 %] 94 %  BP: (150-197)/(67-86) 171/76     Weight: 79.1 kg (174 lb 6.1 oz)  Body mass index is 28.15 kg/m².    Intake/Output Summary (Last 24 hours) at 9/17/2023 0932  Last data filed at 9/16/2023 2145  Gross per 24 hour   Intake 1400 ml   Output --   Net 1400 ml         Physical Exam  Constitutional:       General: She is not in acute distress.  Pulmonary:      Effort: No respiratory distress.      Breath sounds: No wheezing.   Abdominal:      General: There is no distension.      Tenderness: There is no abdominal tenderness.   Musculoskeletal:      Right lower leg: No edema.      Left lower leg: No edema.   Neurological:      General: No focal deficit present.      Mental Status: She is oriented to person, place, and time.       Comments: But does not recall home meds              Significant Labs: All pertinent labs within the past 24 hours have been reviewed.    Significant Imaging: I have reviewed all pertinent imaging results/findings within the past 24 hours.      Assessment/Plan:      * Alcohol use disorder, severe, dependence  Alcoholic intoxication  Risk for alcohol withdrawal  Risk is decreased as she has only had 4 days of drinking   Alcoholic ketoacidosis - ketonuria   Hyperkalemia - resolved   Thrombocytopenia 2/2 alcoholism   Hx of alcohol use disorder with hx of prior seizures, alcohol withdrawal, depression with suicide attempt 2017   - presenting to ED by EMS for alcohol intoxication. Per patient's son, patient has been binge drinking for the past 4 days after recent alcohol rehab in [place] for 1 month, patient reports half a pint of vodka, last drink today  - Patient's son reports that he checked on her throughout the day and she had gotten progressively intoxicated.    - Patient also reports fall down stairs (unknown when) with knee pain to ED provider  - H/H 11.9/38.1 (baseline), Plt 89 (baseline thrombocytopenia), Alb WNL. K 5.4, Na 137, AG 24, Cr 0.8.  UDS positive for alcohol only. UA with ketonuria. EKG with sinus tachycardia.       Plan:  - Continue thiamine 100mg PO daily, folic acid 1mg PO daily, MVI PO daily  - CIWA Q4  - Diazepam 5mg IV Q4 PRN if CIWA >15  - CT brain  - PT/OT    COPD (chronic obstructive pulmonary disease)  (PFT 3/2022: +obstruction with FEV1 67% of predicted)  Obesity with alveolar hypoventilation, GG lung nodule, sarcoidosis, nicotine dependence current smoker (10 cigarettes a day, on and off since teenage years)   Some hypoxia in ED requiring 2L NC but resolved now. Pt denied cough but admitting provider could appreciate cough     Plan-   - O2 as needed to keep Pox > 92%  - Duonebs Q4 PRN  - Patient declined nicotine patches, continue to encourage smoking cessation      Sepsis  - ED  reports SOB and cough but patient denies SOB  - Afebrile, , RR 17, /76, Pox 96% on RA, then placed on 2L/min NC for slight desaturation to 95%. Covid and Flu negative. Leukocytosis 48K, lactate 3.1 (rpt lactate normalized), procal normal.   - UA with no nitrites, leuks. CXR with no acute abnormality. CTA Chest with no PE, but some lung base opacities and atelectasis possibly related to nonspecific pneumonitis. See full report.   - In ED: Patient was given 2.4L NaCl 0.9% fluid bolus in total, Ceftriaxone 2g IV and azithromycin 500mg IV    Unsure if labs, signs and ysmtpoms related to infection vs alcohol intoxication and dehydration. Will conitnue with abx for now given unclear picture.     Plan:  - Continue Ceftriaxone 1g IV daily, azithromycin 250mg PO x 4 more doses  - Duonebs Q4 wake  - Oxygen as needed to keep Pox > 92% (patient has COPD)  - Trend CBC, strict intake and output    Essential hypertension  Hx of HTN, HLD  Chronic, hypertensive  Patient does not know what meds she is taking. Night NP called patient's son who lives with her, went to  (1.30am)    - Med recs with pharmacy on Monday   - Given HTN will start some meds on home med list   - Hydralazine 10mg IV Q6 PRN for SBP > 180, DBP >100      Type 2 diabetes mellitus with hypoglycemia    Last A1c reviewed-   Lab Results   Component Value Date    HGBA1C 4.7 06/16/2023     Most recent fingerstick glucose reviewed-   Recent Labs   Lab 09/16/23  2131 09/17/23  0039 09/17/23  0707   POCTGLUCOSE 81 66* 87     Current correctional scale  Low  Maintain anti-hyperglycemic dose as follows-   Antihyperglycemics (From admission, onward)    Start     Stop Route Frequency Ordered    09/17/23 0103  insulin aspart U-100 pen 0-5 Units         -- SubQ Before meals & nightly PRN 09/17/23 0006              VTE Risk Mitigation (From admission, onward)         Ordered     enoxaparin injection 40 mg  Daily         09/17/23 0006     IP VTE HIGH RISK PATIENT  Once          09/17/23 0006     Place sequential compression device  Until discontinued         09/17/23 0006                Discharge Planning   BECCA:      Code Status: Full Code   Is the patient medically ready for discharge?:     Reason for patient still in hospital (select all that apply): Treatment and PT / OT recommendations             Kalyan Eisenberg MD  Department of Hospital Medicine   [facility] ([address])

## 2023-09-17 NOTE — ED NOTES
Patient to CT 1905, pt's IV blew while in CT. New IV placed, pt back to room, pt placed back on monitor, and oxygen 2/L. Pt denies any pain or discomfort. No resp distress noted. Pt is A&OX4. Pt stated she was recently in [place] in the [place] area for Rehab. Pt does not have any complaints at this time, pt is drowsy and vitally stable, no resp distress noted.

## 2023-09-17 NOTE — ED NOTES
Pt resting in bed, pt connected to monitor, pt has oxygen on via NC, pt denies any pain or discomfort, no resp distress noted.

## 2023-09-17 NOTE — ASSESSMENT & PLAN NOTE
Hx of HTN, HLD  Chronic, hypertensive  Patient does not know what meds she is taking. Called patient's son who lives with her, went to  (1.30am)    - Med recs with son in morning  - Hydralazine 10mg IV Q6 PRN for SBP > 180, DBP >100

## 2023-09-17 NOTE — H&P
[address] Medicine  History & Physical    Patient Name: Earl Abdul  MRN: [id number]  Patient Class: IP- Inpatient  Admission Date: 9/16/2023  Attending Physician: Kalyan Fisher MD   Primary Care Provider: Andrew Rodriguez MD    Patient information was obtained from patient, past medical records and ER records.     Subjective:     Principal Problem:Alcoholic ketoacidosis    Chief Complaint:   Chief Complaint   Patient presents with    Alcohol Intoxication       Recently released from [place] [facility] due to Alcoholism  Admits she has been drinking for 4 days straight but denies any pain        HPI: Patient is a difficult historian, doesn't want to engage much, doesn't remember her meds, alert and oriented.     According to ED provider note:  Earl Abdul is a 63 year old lady with hx of alcohol use disorder with hx of prior seizures, alcohol withdrawal, depression with suicide attempt 2017, non-insulin dependent DM, COPD, GERD, fibromyalgia, sarcoidosis, breast Ca, CLL (no current treatment), HTN, HLD, hypothyroidism presenting to ED by EMS for alcohol intoxication. Per patient's son, patient has been binge drinking for the past 4 days after recent alcohol rehab in [place] for 1 month. Patient's son reports that he checked on her throughout the day and she had gotten progressively intoxicated. Patient reports drinking vodka today but unable to say how much. Patient also reports fall (unknown when) with knee pain. Patient also reports a cough with SOB.     Afebrile, , RR 17, /76, Pox 96% on RA, then placed on 2L/min NC for slight desaturation to 95%. Covid and Flu negative. Leukocytosis 48K, lactate 3.1, procal normal. H/H 11.9/38.1 (baseline), Plt 89 (baseline thrombocytopenia), Alb WNL. K 5.4, Na 137, AG 24, Cr 0.8, Glucose 57. BNP WNL, Trop I 0.033. UDS positive for alcohol only. UA with no nitrites, leuks. EKG with sinus tachycardia. CXR  "with no acute abnormality. CTA Chest with no PE, but some lung base opacities and atelectasis possibly related to nonspecific pneumonitis. See full report. In ED: Patient was given 2.4L NaCl 0.9% fluid bolus in total, thiamine 500mg IV, folic acid 1mg IV, ondansetron 4mg IV x 2, ketoroalc 10mg IV x 1, lorazepam 2mg IV, diazepam 5mg IV x 2. Ceftriaxone 2g IV and azithromycin 500mg IV was also given to cover for pneumonia.     Patient replies "alcohol" when asked why she is in the hospital. Difficult historian, does not volunteer information, gives "yes, no" answers. Says no to SOB and cough but Pox 93% and was obviously coughing. Probed about fall, she reports that she fell down the stairs, did not know when, declined HI, declined any other injuries (patient ambulated to bathroom". Denies fever, chills, dysuria, abdominal pain, nausea, vomiting. Has resting tremors.     Patient is admitted to Hospital Medicine for management of alcohol intoxication, risk for withdrawal and sepsis likely due to respiratory source.       Past Medical History:   Diagnosis Date    Anemia     Anemia     Controlled type 2 diabetes mellitus without complication, without long-term current use of insulin 11/30/2021    COPD (chronic obstructive pulmonary disease)     Depression     Diverticulitis     Fatty liver     GERD (gastroesophageal reflux disease)     Hyperlipidemia     Hypertension     Pancreatitis     Peptic ulcer disease     Polysubstance abuse     Posterior reversible encephalopathy syndrome     Sarcoidosis of lung     Sarcoidosis of lung     over 30 yrs ago    Seizures     7/2017    Suicide attempt     Suicide ideation        Past Surgical History:   Procedure Laterality Date    APPENDECTOMY      BILATERAL MASTECTOMY Bilateral 10/29/2020    Procedure: MASTECTOMY, BILATERAL;  Surgeon: Baylee Kevin MD;  Location: [facility] OR [address] [place] [address];  Service: General;  Laterality: Bilateral;    BREAST REVISION SURGERY Bilateral " 2/11/2021    Procedure: BREAST REVISION SURGERY;  Surgeon: Scottie Johnson MD;  [facility];  Service: Plastics;  Laterality: Bilateral;    COLONOSCOPY N/A 7/28/2017    Procedure: COLONOSCOPY;  Surgeon: Aaron Alvarado MD;  Location: [facility] ENDO;  Service: Endoscopy;  Laterality: N/A;    ESOPHAGOGASTRODUODENOSCOPY  10/7/2016, 11/6/2014    2016 - gastritis, duodenitis, 2014 erosive gastritis    ESOPHAGOGASTRODUODENOSCOPY N/A 2/11/2020    Procedure: ESOPHAGOGASTRODUODENOSCOPY (EGD);  Surgeon: Fawn Garrido MD;  Location: [facility] ENDO;  Service: Endoscopy;  Laterality: N/A;    ESOPHAGOGASTRODUODENOSCOPY N/A 4/19/2021    Procedure: EGD (ESOPHAGOGASTRODUODENOSCOPY);  Surgeon: Paramjit Martino MD;  Location: [facility] ENDO;  Service: Endoscopy;  Laterality: N/A;    FLEXIBLE SIGMOIDOSCOPY  11/06/2014    colitis    HYSTERECTOMY      IMPLANTATION OF PERMANENT SACRAL NERVE STIMULATOR N/A 7/12/2022    Procedure: INSERTION, NEUROSTIMULATOR, PERMANENT, SACRAL;  Surgeon: Juaquin Edwards MD;  [facility];  Service: Urology;  Laterality: N/A;  1hr    INJECTION FOR SENTINEL NODE IDENTIFICATION Right 10/29/2020    Procedure: INJECTION, FOR SENTINEL NODE IDENTIFICATION;  Surgeon: Baylee Kevin MD;  [facility];  Service: General;  Laterality: Right;    INJECTION OF JOINT Right 10/10/2019    Procedure: Injection, Joint RIGHT ILIOPSOAS BURSA/TENDON INJECTION AND RIGHT GLUTEAL TENDON INJECTION WITH STEROID AND LIDOCAINE;  Surgeon: Guillaume Rico MD;  Location: [facility] PAIN MGT;  Service: Pain Management;  Laterality: Right;  NEEDS CONSENT    INSERTION OF BREAST TISSUE EXPANDER Bilateral 10/29/2020    Procedure: INSERTION, TISSUE EXPANDER, BREAST;  Surgeon: Scottie Johnson MD;  [facility];  Service: Plastics;  Laterality: Bilateral;  Right breast: 1082 g  Left breast: 1076 g    LIPOSUCTION Bilateral 2/11/2021    Procedure: LIPOSUCTION;  Surgeon: Scottie  PATRICIA Johnson MD;  Location: [address];  Service: Plastics;  Laterality: Bilateral;    mediastenoscopy      REPLACEMENT OF IMPLANT OF BREAST Bilateral 2/11/2021    Procedure: REPLACEMENT, IMPLANT, BREAST;  Surgeon: Scottie Johnson MD;  Location: [facility] OR [address] [place] [address];  Service: Plastics;  Laterality: Bilateral;    SENTINEL LYMPH NODE BIOPSY Right 10/29/2020    Procedure: BIOPSY, LYMPH NODE, SENTINEL;  Surgeon: Baylee Kevin MD;  Location: [address];  Service: General;  Laterality: Right;    TONSILLECTOMY N/A 1970    TUBAL LIGATION         Review of patient's allergies indicates:   Allergen Reactions    Lortab [hydrocodone-acetaminophen] Itching    Promethazine Itching and Other (See Comments)       Current Facility-Administered Medications on File Prior to Encounter   Medication    albuterol sulfate nebulizer solution 2.5 mg     Current Outpatient Medications on File Prior to Encounter   Medication Sig    acetaminophen (TYLENOL) 500 MG tablet Take 500-1,500 mg by mouth daily as needed for Pain.    albuterol (PROVENTIL/VENTOLIN HFA) 90 mcg/actuation inhaler     anastrozole (ARIMIDEX) 1 mg Tab     ARIPiprazole (ABILIFY) 10 MG Tab Take 1 tablet by mouth once daily.    ARIPiprazole (ABILIFY) 2 MG Tab Take 1 tablet by mouth once daily.    ARIPiprazole (ABILIFY) 5 MG Tab Take 1 tablet by mouth every morning.    atorvastatin (LIPITOR) 10 MG tablet Take 1 tablet by mouth once daily.    busPIRone (BUSPAR) 15 MG tablet     butalbital-acetaminophen-caffeine -40 mg (FIORICET, ESGIC) -40 mg per tablet Take 1 tablet by mouth every 4 (four) hours as needed.    cholecalciferol, vitamin D3, (VITAMIN D3) 50 mcg (2,000 unit) Cap capsule TAKE 1 CAPSULE BY MOUTH ONCE A DAY IN THE MORNING    cloNIDine (CATAPRES) 0.1 MG tablet Take by mouth.    diazePAM (VALIUM) 5 MG tablet Take 2 tablets (10 mg total) by mouth every 8 (eight) hours for 3 days, THEN 2 tablets (10 mg total) every 12 (twelve)  hours for 3 days, THEN 1 tablet (5 mg total) every 12 (twelve) hours for 3 days, THEN 1 tablet (5 mg total) once daily for 3 days, THEN 0.5 tablets (2.5 mg total) once daily for 3 days.    dicyclomine (BENTYL) 20 mg tablet Take 20 mg by mouth 4 (four) times daily.    doxepin (SINEQUAN) 150 MG Cap Take 150 mg by mouth every evening.    DULoxetine (CYMBALTA) 60 MG capsule Take 60 mg by mouth.    EScitalopram oxalate (LEXAPRO) 10 MG tablet Take 10 mg by mouth once daily.    FLUoxetine 20 MG capsule Take 3 capsules by mouth once daily.    fluticasone furoate-vilanteroL (BREO ELLIPTA) 100-25 mcg/dose diskus inhaler Inhale 1 puff into the lungs once daily. Controller    folic acid (FOLVITE) 1 MG tablet Take 1 tablet (1 mg total) by mouth once daily.    gabapentin (NEURONTIN) 600 MG tablet Take 1 tablet (600 mg total) by mouth 3 (three) times daily. for 10 days    hydroCHLOROthiazide (HYDRODIURIL) 25 MG tablet     hydrOXYzine pamoate (VISTARIL) 50 MG Cap TAKE ONE CAPSULE BY MOUTH EVERY 6 HOURS AS NEEDED FOR ANXIETY AND/OR EVERY NIGHT AT BEDTIME AS NEEDED FOR INSOMNIA    ipratropium (ATROVENT HFA) 17 mcg/actuation inhaler Inhale 2 puffs into the lungs every 6 (six) hours as needed.    letrozole (FEMARA) 2.5 mg Tab     levothyroxine (SYNTHROID) 50 MCG tablet TAKE 1 TABLET(50 MCG) BY MOUTH BEFORE BREAKFAST    lisinopriL (PRINIVIL,ZESTRIL) 20 MG tablet Take 1 tablet (20 mg total) by mouth once daily.    loperamide (IMODIUM) 2 mg capsule Take 2 mg by mouth 4 (four) times daily as needed for Diarrhea (Per package directions).    losartan (COZAAR) 25 MG tablet Take 1 tablet by mouth once daily.    melatonin (MELATIN) Take by mouth nightly as needed for Insomnia.    metFORMIN (GLUCOPHAGE-XR) 500 MG ER 24hr tablet Take 2 tablets (1,000 mg total) by mouth 2 (two) times daily with meals.    metoprolol succinate (TOPROL-XL) 50 MG 24 hr tablet     mirtazapine (REMERON) 30 MG tablet     multivitamin Tab Take 1  tablet by mouth once daily.    nabumetone (RELAFEN) 500 MG tablet Take 500 mg by mouth 2 (two) times daily.    ondansetron (ZOFRAN-ODT) 4 MG TbDL Take 1 tablet (4 mg total) by mouth every 6 (six) hours as needed (nausea/vomiting).    pantoprazole (PROTONIX) 40 MG tablet Take 40 mg by mouth once daily.    propranoloL (INDERAL) 20 MG tablet Take 20 mg by mouth 2 (two) times daily.    thiamine 100 MG tablet Take 1 tablet (100 mg total) by mouth once daily.    traZODone (DESYREL) 300 MG tablet Take 1 tablet by mouth every evening.     Family History       Problem Relation (Age of Onset)    Breast cancer Maternal Aunt, Daughter    Colon cancer Maternal Uncle    Diabetes Father, Mother    Heart attack Father    Hypertension Father, Mother          Tobacco Use    Smoking status: Every Day     Current packs/day: 0.00     Average packs/day: 0.5 packs/day for 30.0 years (15.0 ttl pk-yrs)     Types: Vaping with nicotine, Cigarettes     Start date: 1991     Last attempt to quit: 2021     Years since quittin.6    Smokeless tobacco: Never    Tobacco comments:     Patient is currently smoking 10 cigarettes a day, declines nicotine patches   Substance and Sexual Activity    Alcohol use: Yes     Comment: vodka daily (half a regular bottle) for 4 days    Drug use: Yes     Types: Marijuana     Comment: gummies    Sexual activity: Yes     Birth control/protection: Surgical     Review of Systems   Constitutional:  Negative for chills and fever.   HENT:  Negative for congestion and sore throat.    Eyes:  Negative for pain and redness.   Respiratory:  Positive for cough. Negative for shortness of breath.    Cardiovascular:  Negative for chest pain and leg swelling.   Gastrointestinal:  Negative for abdominal pain, diarrhea, nausea and vomiting.   Genitourinary:  Negative for difficulty urinating and dysuria.   Musculoskeletal:  Negative for back pain and gait problem.   Skin:  Negative for rash and wound.  "  Neurological:  Positive for tremors and headaches.   Psychiatric/Behavioral:  Negative for agitation and behavioral problems.      Objective:     Vital Signs (Most Recent):  Temp: 98.9 °F (37.2 °C) (09/16/23 2347)  Pulse: 108 (09/17/23 0147)  Resp: 20 (09/16/23 2347)  BP: (!) 171/75 (09/16/23 2347)  SpO2: (!) 93 % (09/16/23 2347) Vital Signs (24h Range):  Temp:  [97.9 °F (36.6 °C)-98.9 °F (37.2 °C)] 98.9 °F (37.2 °C)  Pulse:  [] 108  Resp:  [16-20] 20  SpO2:  [93 %-100 %] 93 %  BP: (150-197)/(67-86) 171/75     Weight: 79.1 kg (174 lb 6.1 oz)  Body mass index is 28.15 kg/m².     Physical Exam  Vitals and nursing note reviewed.   Constitutional:       General: She is not in acute distress.     Appearance: She is not ill-appearing.      Comments: Ambulated to bathroom and back  Resting tremors   HENT:      Head: Normocephalic and atraumatic.      Nose: No congestion or rhinorrhea.   Eyes:      General: No scleral icterus.        Right eye: No discharge.         Left eye: No discharge.   Cardiovascular:      Rate and Rhythm: Regular rhythm. Tachycardia present.   Pulmonary:      Effort: Pulmonary effort is normal. No respiratory distress.      Breath sounds: Wheezing present.   Abdominal:      Palpations: Abdomen is soft.      Tenderness: There is no abdominal tenderness.   Musculoskeletal:      Right lower leg: No edema.      Left lower leg: No edema.   Skin:     General: Skin is warm and dry.   Neurological:      Mental Status: She is alert and oriented to person, place, and time. Mental status is at baseline.   Psychiatric:         Mood and Affect: Affect is flat.         Behavior: Behavior is withdrawn.                Significant Labs: All pertinent labs within the past 24 hours have been reviewed.  Bilirubin:   Recent Labs   Lab 09/16/23  1625   BILITOT 0.6     Blood Culture: No results for input(s): "LABBLOO" in the last 48 hours.  BMP:   Recent Labs   Lab 09/16/23  1625   GLU 57*      K 5.4*   CL 97 " "  CO2 16*   BUN 17   CREATININE 0.8   CALCIUM 9.2   MG 1.9     CBC:   Recent Labs   Lab 09/16/23  1625   WBC 48.96*   HGB 11.9*   HCT 38.1   PLT 89*     CMP:   Recent Labs   Lab 09/16/23  1625      K 5.4*   CL 97   CO2 16*   GLU 57*   BUN 17   CREATININE 0.8   CALCIUM 9.2   PROT 7.8   ALBUMIN 4.5   BILITOT 0.6   ALKPHOS 73   AST 41*   ALT 26   ANIONGAP 24*     Lactic Acid:   Recent Labs   Lab 09/16/23  1721 09/16/23  2129   LACTATE 3.1* 1.4     Magnesium:   Recent Labs   Lab 09/16/23  1625   MG 1.9     POCT Glucose:   Recent Labs   Lab 09/16/23  2131 09/17/23  0039   POCTGLUCOSE 81 66*     Troponin:   Recent Labs   Lab 09/16/23  1625   TROPONINI 0.033*     Urine Culture: No results for input(s): "LABURIN" in the last 48 hours.  Urine Studies:   Recent Labs   Lab 09/16/23  1744   COLORU Yellow   APPEARANCEUA Clear   PHUR 6.0   SPECGRAV 1.015   PROTEINUA Trace*   GLUCUA Negative   KETONESU 2+*   BILIRUBINUA Negative   OCCULTUA Negative   NITRITE Negative   UROBILINOGEN Negative   LEUKOCYTESUR Negative       Significant Imaging: I have reviewed and interpreted all pertinent imaging results/findings within the past 24 hours.    Assessment/Plan:     * Alcohol use disorder, severe, dependence   Alcoholic intoxication   Risk for alcohol withdrawal   Alcoholic ketoacidosis   Hyperkalemia  Hx of alcohol use disorder with hx of prior seizures, alcohol withdrawal, depression with suicide attempt 2017   - presenting to ED by EMS for alcohol intoxication. Per patient's son, patient has been binge drinking for the past 4 days after recent alcohol rehab in [place] for 1 month, patient reports half a pint of vodka, last drink today  - Patient's son reports that he checked on her throughout the day and she had gotten progressively intoxicated.    - Patient also reports fall (unknown when) with knee pain to ED provider, reports that she fell down the stairs with no HI and no other injuries Patient also reports a cough with " SOB.   - H/H 11.9/38.1 (baseline), Plt 89 (baseline thrombocytopenia), Alb WNL. K 5.4, Na 137, AG 24, Cr 0.8.  UDS positive for alcohol only. UA with ketonuria. EKG with sinus tachycardia.   - In ED: Patient was given 2.4L NaCl 0.9% fluid bolus in total, thiamine 500mg IV, folic acid 1mg IV, ondansetron 4mg IV x 2, ketoroalc 10mg IV x 1, lorazepam 2mg IV, diazepam 5mg IV x 2.     Plan:  - Trend CMP  - Continue thiamine 100mg PO daily, folic acid 1mg PO daily, MVI PO daily  - CIWA Q4  - Diazepam 5mg IV Q4 PRN if CIWA >15    Sepsis  - ED reports SOB and cough but patient denies SOB  - Afebrile, , RR 17, /76, Pox 96% on RA, then placed on 2L/min NC for slight desaturation to 95%. Covid and Flu negative. Leukocytosis 48K, lactate 3.1, procal normal.   - UA with no nitrites, leuks. CXR with no acute abnormality. CTA Chest with no PE, but some lung base opacities and atelectasis possibly related to nonspecific pneumonitis. See full report.   - In ED: Patient was given 2.4L NaCl 0.9% fluid bolus in total, Ceftriaxone 2g IV and azithromycin 500mg IV    Plan:  - Continue Ceftriaxone 1g IV daily, azithromycin 250mg PO x 4 more doses  - Duonebs Q4 wake  - Oxygen as needed to keep Pox > 92% (patient has COPD)  - Trend CBC, strict intake and output    Type 2 diabetes mellitus with hypoglycemia  Patient's FSGs are uncontrolled due to hypoglycemia on current medication regimen.  Last A1c reviewed-   Lab Results   Component Value Date    HGBA1C 4.7 06/16/2023     Most recent fingerstick glucose reviewed-   Recent Labs   Lab 09/16/23  2131 09/17/23  0039   POCTGLUCOSE 81 66*     Current correctional scale  Low  Maintain anti-hyperglycemic dose as follows-   Antihyperglycemics (From admission, onward)    Start     Stop Route Frequency Ordered    09/17/23 0103  insulin aspart U-100 pen 0-5 Units         -- SubQ Before meals & nightly PRN 09/17/23 0006        Home meds:  Need to check with patient's son in the  morning    Essential hypertension  Hx of HTN, HLD  Chronic, hypertensive  Patient does not know what meds she is taking. Called patient's son who lives with her, went to  (1.30am)    - Med recs with son in morning  - Hydralazine 10mg IV Q6 PRN for SBP > 180, DBP >100      Chronic respiratory failure with hypoxia  Hx of COPD (PFT 3/2022: +obstruction with FEV1 67% of predicted)  , Obesity with alveolar hypoventilation, GG lung nodule, sarcoidosis, nicotine dependence current smoker (10 cigarettes a day, on and off since teenage years)     - O2 as needed to keep Pox > 92%  - Duonebs Q4 PRN  - Med recs when able about patient's inhalers  - Patient declined nicotine patches, continue to encourage smoking cessation    VTE Risk Mitigation (From admission, onward)         Ordered     enoxaparin injection 40 mg  Daily         09/17/23 0006     IP VTE HIGH RISK PATIENT  Once         09/17/23 0006     Place sequential compression device  Until discontinued         09/17/23 0006              Ze Mcintosh NP  Department of Hospital Medicine  [facility] Med Surg ([address])

## 2023-09-17 NOTE — PT/OT/SLP EVAL
Physical Therapy Evaluation and Discharge Note    Patient Name:  Earl Abdul   MRN:  [id number]    Recommendations:     Discharge Recommendations: home  Discharge Equipment Recommendations: none   Barriers to discharge: None    Assessment:     Earl Abdul is a 63 y.o. female admitted with a medical diagnosis of Alcohol use disorder, severe, dependence.     Patient evaluated and no acute care PT goals identified. Patient with grossly stable gait despite slight tremors to BUE and RLE. She reports she's at her baseline mobility and only complains of headache pain. Denies recent falls.     During this hospitalization, patient does not require further acute PT services.  Please re-consult if situation changes.  Recommendation for d/c to home with OP PT if having falls at home.    Recent Surgery: * No surgery found *      Plan:     During this hospitalization, patient does not require further acute PT services.  Please re-consult if situation changes.      Subjective     Chief Complaint: Headache  Patient/Family Comments/goals: Goal to return to bed; Patient agreeable to evaluation.  Pain/Comfort:  Pain Rating 1: 9/10  Location 1: head  Pain Addressed 1: Reposition, Cessation of Activity, Distraction, Nurse notified  Pain Rating Post-Intervention 1: 9/10    Patients cultural, spiritual, Sikh conflicts given the current situation: no    Living Environment:  Pt lives with her  in a 2 story home with flight of steps to bedroom with HR and 4 JANESSA  Upon discharge, patient will have assistance from her .  Prior level of function:  Ambulation: Indep  ADL's: Indep  IADLs: Indep  Artist ()  Falls: Denies falls (EMR reports fall down stairs with CT of head ordered)  Equipment used at home: none.     Objective:     Communicated with OT prior to session.  Patient found ambulatory in room/rawls with peripheral IV upon PT entry to room.    General Precautions: Standard,      Orthopedic Precautions:N/A    Braces: N/A  Respiratory Status: Room air    Patient donned non slip socks for OOB mobility.    Exams:  Cognition:   Patient is oriented to person, , place.  Pt follows approximately 100% of one step commands.    Mood: Cooperative, flat affect and taciturn.   Safety Awareness: Impaired  Musculoskeletal:  BMI: 28.15  Posture:  Forward head  LE ROM/Strength:   R ROM: WFL  L ROM: WFL  R Strength: WFL  L Strength: WFL  Neuromuscular:  Tone/Reflexes: Minor tremors to BUE and RLE  Balance:   Tinetti Total Score: 27  < 18 = High Risk of Falls, 19-23 = Moderate Risk of Falls, > 24 = Low Risk of Falls  Visual-vestibular: No impairments identified with functional mobility. No formal testing performed.  Integument: Visible skin intact  Cardiopulmonary:  Edema: None noted      Functional Mobility:  Bed Mobility:     Supine to Sit: independence  Sit to Supine: independence  Transfers:     Sit to Stand:  independence with no AD  Gait: 3x30 ft with no AD and supervision-independence. No gross gait instability noted. Pt reports baseline mobility.     AM-PAC 6 CLICK MOBILITY  Total Score:24       Treatment and Education:  PT educated patient re:   PT plan of care/role of PT  Safety with OOB mobility  Discharge disposition    Pt  verbalized understanding     AM-PAC 6 CLICK MOBILITY  Total Score:24     Patient left supine with all lines intact, call button in reach, and Rn Jenni notified.    GOALS:   Multidisciplinary Problems       Physical Therapy Goals       Not on file              Multidisciplinary Problems (Resolved)          Problem: Physical Therapy    Goal Priority Disciplines Outcome Goal Variances Interventions   Physical Therapy Goal   (Resolved)     PT, PT/OT Met                         History:     Past Medical History:   Diagnosis Date    Anemia     Anemia     Controlled type 2 diabetes mellitus without complication, without long-term current use of insulin 2021    COPD (chronic obstructive pulmonary  disease)     Depression     Diverticulitis     Fatty liver     GERD (gastroesophageal reflux disease)     Hyperlipidemia     Hypertension     Pancreatitis     Peptic ulcer disease     Polysubstance abuse     Posterior reversible encephalopathy syndrome     Sarcoidosis of lung     Sarcoidosis of lung     over 30 yrs ago    Seizures     7/2017    Suicide attempt     Suicide ideation        Past Surgical History:   Procedure Laterality Date    APPENDECTOMY      BILATERAL MASTECTOMY Bilateral 10/29/2020    Procedure: MASTECTOMY, BILATERAL;  Surgeon: Baylee Kevin MD;  Location: [address];  Service: General;  Laterality: Bilateral;    BREAST REVISION SURGERY Bilateral 2/11/2021    Procedure: BREAST REVISION SURGERY;  Surgeon: Scottie Johnson MD;  Location: [address];  Service: Plastics;  Laterality: Bilateral;    COLONOSCOPY N/A 7/28/2017    Procedure: COLONOSCOPY;  Surgeon: Aaron Alvarado MD;  Location: [facility];  Service: Endoscopy;  Laterality: N/A;    ESOPHAGOGASTRODUODENOSCOPY  10/7/2016, 11/6/2014    2016 - gastritis, duodenitis, 2014 erosive gastritis    ESOPHAGOGASTRODUODENOSCOPY N/A 2/11/2020    Procedure: ESOPHAGOGASTRODUODENOSCOPY (EGD);  Surgeon: Fawn Garrido MD;  Location: [facility];  Service: Endoscopy;  Laterality: N/A;    ESOPHAGOGASTRODUODENOSCOPY N/A 4/19/2021    Procedure: EGD (ESOPHAGOGASTRODUODENOSCOPY);  Surgeon: Paramjit Martino MD;  Location: [facility];  Service: Endoscopy;  Laterality: N/A;    FLEXIBLE SIGMOIDOSCOPY  11/06/2014    colitis    HYSTERECTOMY      IMPLANTATION OF PERMANENT SACRAL NERVE STIMULATOR N/A 7/12/2022    Procedure: INSERTION, NEUROSTIMULATOR, PERMANENT, SACRAL;  Surgeon: Juaquin Edwards MD;  Location: [address];  Service: Urology;  Laterality: N/A;  1hr    INJECTION FOR SENTINEL NODE IDENTIFICATION Right 10/29/2020    Procedure: INJECTION, FOR SENTINEL NODE IDENTIFICATION;  Surgeon: Baylee Kevin MD;  Location: [address];   Service: General;  Laterality: Right;    INJECTION OF JOINT Right 10/10/2019    Procedure: Injection, Joint RIGHT ILIOPSOAS BURSA/TENDON INJECTION AND RIGHT GLUTEAL TENDON INJECTION WITH STEROID AND LIDOCAINE;  Surgeon: Guillaume Rico MD;  Location: [facility];  Service: Pain Management;  Laterality: Right;  NEEDS CONSENT    INSERTION OF BREAST TISSUE EXPANDER Bilateral 10/29/2020    Procedure: INSERTION, TISSUE EXPANDER, BREAST;  Surgeon: Scottie Johnson MD;  Location: [address];  Service: Plastics;  Laterality: Bilateral;  Right breast: 1082 g  Left breast: 1076 g    LIPOSUCTION Bilateral 2/11/2021    Procedure: LIPOSUCTION;  Surgeon: Scottie Johnson MD;  Location: [facility] OR [address] [place] [address];  Service: Plastics;  Laterality: Bilateral;    mediastenoscopy      REPLACEMENT OF IMPLANT OF BREAST Bilateral 2/11/2021    Procedure: REPLACEMENT, IMPLANT, BREAST;  Surgeon: Scottie Johnson MD;  Location: [address];  Service: Plastics;  Laterality: Bilateral;    SENTINEL LYMPH NODE BIOPSY Right 10/29/2020    Procedure: BIOPSY, LYMPH NODE, SENTINEL;  Surgeon: Baylee Kevin MD;  Location: [address];  Service: General;  Laterality: Right;    TONSILLECTOMY N/A 1970    TUBAL LIGATION         Time Tracking:     PT Received On: 09/17/23  PT Start Time: 1331     PT Stop Time: 1342  PT Total Time (min): 11 min     Overlap with OT for portions of session due to complex nature of patient, tolerance for therapeutic modalities, and safety with mobility to decrease fall risk for patient and caregiver injury requiring two skilled therapists to provide interventions.    Billable Minutes: Evaluation 11      09/17/2023

## 2023-09-17 NOTE — HOSPITAL COURSE
Pt doing well this AM on room air. Improvement in WBC could be 2/2 to fluids or abx. With neg procal and no infectious sx like fever less likely to be leukocytosis in setting of infection, never the less on admission she did have reported cough and tachycardia along with leukocytosis so will continue antibiotics for now.     Regarding fall down stairs, pt is unclear about the details and if she hit head or not so will order CT brain.     Pt is unclear about her home meds - I tried to call her pharmacy but they are closed on a Sunday. Will reattempt tomorrow. In the meantime, will start some blood pressure medications listed on home meds on the chart as she is hypertensive.    In terms of alcohol, she has been sober for 60 days after going to a rehab and started drinking vodka heavily 4 days because she was lonely and sad because her  left on a business trip. Her son lives in the area and she asked him to take her to hospital because she felt she was drinking too much. She thinks she can abstain when she goes home and does not want to go back to a rehab at this time. On physical exma she is not currently withdrawing.     Her anion gap and acidosis has improved with the fluids. Will continue to fluid resus her, monitor for signs of withdrawal (though with 4 day drinking hx I doubt she will withdraw), be eval by PT and OT and plan for d/c tomrorow if she remains stable.     She was eval by PT and OT with no needs. Pt is ready to leave even if she has to leave AMA - says she feels better. I evaluated pt. She has capacity. She is stable from my point of view and discussed about alcohol cessation at length. She says she feels confident she can go home and abstain from drinking. She does not feel like she is withdrawing and she does not have physical signs of withdrawal. She was advised to eat and drink well to which she expressed understanding. She will also go home with 3 more days of abx.  Also she will follow up  with PCP.

## 2023-09-17 NOTE — ASSESSMENT & PLAN NOTE
(PFT 3/2022: +obstruction with FEV1 67% of predicted)  Obesity with alveolar hypoventilation, GG lung nodule, sarcoidosis, nicotine dependence current smoker (10 cigarettes a day, on and off since teenage years)   Some hypoxia in ED requiring 2L NC but resolved now. Pt denied cough but admitting provider could appreciate cough     Plan-   - O2 as needed to keep Pox > 92%  - Duonebs Q4 PRN  - Patient declined nicotine patches, continue to encourage smoking cessation

## 2023-09-17 NOTE — ASSESSMENT & PLAN NOTE
Hx of COPD (PFT 3/2022: +obstruction with FEV1 67% of predicted)  , Obesity with alveolar hypoventilation, GG lung nodule, sarcoidosis, nicotine dependence current smoker (10 cigarettes a day, on and off since teenage years)     - O2 as needed to keep Pox > 92%  - Duonebs Q4 PRN  - Med recs when able about patient's inhalers  - Patient declined nicotine patches, continue to encourage smoking cessation

## 2023-09-17 NOTE — PT/OT/SLP EVAL
Occupational Therapy   Evaluation and Discharge Note    Name: Earl Abdul  MRN: [id number]  Admitting Diagnosis: Alcohol use disorder, severe, dependence  Recent Surgery: * No surgery found *      Recommendations:     Discharge Recommendations: substance abuse facility  Discharge Equipment Recommendations:  (None anticipated.)  Barriers to discharge:  None    Assessment:   Initial OT eval completed.  Donned socks/mesh underwear independently with no LOB or dynamic standing during ADL.  Ambulating short household distance and performing step transfer to/from toilet independently.  Denies any recent falls though this contradicts information from MD H&P in EMR.  No DME in use PTA.  No DME needs anticipated.  No acute care or post acute OT needs identified.  Recommend post acute substance abuse facility.  To discontinue OT.  Please re-consult if changes occur.     Earl Abdul is a 63 y.o. female with a medical diagnosis of Alcohol use disorder, severe, dependence. At this time, patient is functioning at their prior level of function and does not require further acute OT services.     Plan:     During this hospitalization, patient does not require further acute OT services.  Please re-consult if situation changes.    Plan of Care Reviewed with: patient    Subjective     Chief Complaint: With c/o headache and generalized pain.   Patient/Family Comments/goals: No goals stated.     Occupational Profile:  Lives with spouse in 2SH with 5 JANESSA and 1-2 handrails; bathroom setup as tub/shower and walk-in and standard toilet.  Previously independent in ADL and IADL.  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  House keeper comes in every other week for cleaning.   Equipment Used at home: none  Assistance upon Discharge: Lives with spouse.     Pain/Comfort:  Pain Rating 1: 9/10  Location - Orientation 1: generalized  Location 1: head (headache as well as generalized pain)  Pain Addressed 1: Distraction, Nurse  notified, Cessation of Activity  Pain Rating Post-Intervention 1: 9/10    Patients cultural, spiritual, Mu-ism conflicts given the current situation:  (None stated.)    Objective:     Communicated with: Jenni LAMBERT LPN prior to session.  Patient found  in bathroom  with peripheral IV, telemetry upon OT entry to room.    General Precautions: Standard,  (standard)  Orthopedic Precautions: N/A  Braces: N/A  Respiratory Status: Room air     Occupational Performance:    Functional Mobility/Transfers:  Ambulating bathroom>bed and again bathroom<>bed independently with no LOB noted.  Step transfer to standard toilet independently.      Activities of Daily Living:  Donned socks seated EOB via cross leg tech and donned mesh underwear threading while seated and then pulling to waist in stance with incidental retrieval of needed clothing items; no LOB noted during task.  Independent with LB dressing this day.     Cognitive/Visual Perceptual:  Cognitive/Psychosocial Skills:  -       Oriented to: Person, Place, Time, and Situation   -       Follows Commands/attention:Follows one-step commands  -       Communication: clear/fluent  -       Memory: No Deficits noted  -       Safety awareness/insight to disability: impaired   -       Mood/Affect/Coping skills/emotional control: Cooperative  Visual/Perceptual:  -grossly intact    Physical Exam:  Postural examination/scapula alignment: -       Rounded shoulders  -       Forward head  Skin integrity: Visible skin intact  Upper Extremity Range of Motion:  -       Right Upper Extremity: WNL  -       Left Upper Extremity: WNL  Upper Extremity Strength: -       Right Upper Extremity: WNL  -       Left Upper Extremity: WNL   Strength: -       Right Upper Extremity: WNL  -       Left Upper Extremity: WNL  Fine Motor Coordination: -       Intact  Left hand thumb/finger opposition skills and Right hand thumb/finger opposition skills    AMPA 6 Click ADL:  AMPAC Total Score:  24    Treatment & Education:  Educated on role of OT and POC.     Patient left  standing in room  with all lines intact, nursing notified, and PT present    GOALS:   Multidisciplinary Problems       Occupational Therapy Goals       Not on file              Multidisciplinary Problems (Resolved)          Problem: Occupational Therapy    Goal Priority Disciplines Outcome Interventions   Occupational Therapy Goal   (Resolved)     OT, PT/OT Met                        History:     Past Medical History:   Diagnosis Date    Anemia     Anemia     Controlled type 2 diabetes mellitus without complication, without long-term current use of insulin 11/30/2021    COPD (chronic obstructive pulmonary disease)     Depression     Diverticulitis     Fatty liver     GERD (gastroesophageal reflux disease)     Hyperlipidemia     Hypertension     Pancreatitis     Peptic ulcer disease     Polysubstance abuse     Posterior reversible encephalopathy syndrome     Sarcoidosis of lung     Sarcoidosis of lung     over 30 yrs ago    Seizures     7/2017    Suicide attempt     Suicide ideation          Past Surgical History:   Procedure Laterality Date    APPENDECTOMY      BILATERAL MASTECTOMY Bilateral 10/29/2020    Procedure: MASTECTOMY, BILATERAL;  Surgeon: Baylee Kevin MD;  Location: [facility] OR [address] [place] [address];  Service: General;  Laterality: Bilateral;    BREAST REVISION SURGERY Bilateral 2/11/2021    Procedure: BREAST REVISION SURGERY;  Surgeon: Scottie Johnson MD;  Location: [facility] OR [address] [place] [address];  Service: Plastics;  Laterality: Bilateral;    COLONOSCOPY N/A 7/28/2017    Procedure: COLONOSCOPY;  Surgeon: Aaron Alvarado MD;  Location: [facility];  Service: Endoscopy;  Laterality: N/A;    ESOPHAGOGASTRODUODENOSCOPY  10/7/2016, 11/6/2014    2016 - gastritis, duodenitis, 2014 erosive gastritis    ESOPHAGOGASTRODUODENOSCOPY N/A 2/11/2020    Procedure: ESOPHAGOGASTRODUODENOSCOPY (EGD);  Surgeon: Fawn Garrido MD;  Location: [facility];  Service:  Endoscopy;  Laterality: N/A;    ESOPHAGOGASTRODUODENOSCOPY N/A 4/19/2021    Procedure: EGD (ESOPHAGOGASTRODUODENOSCOPY);  Surgeon: Paramjit Martino MD;  Location: [facility];  Service: Endoscopy;  Laterality: N/A;    FLEXIBLE SIGMOIDOSCOPY  11/06/2014    colitis    HYSTERECTOMY      IMPLANTATION OF PERMANENT SACRAL NERVE STIMULATOR N/A 7/12/2022    Procedure: INSERTION, NEUROSTIMULATOR, PERMANENT, SACRAL;  Surgeon: Juaquin Edwards MD;  Location: [facility] OR [address] [place] [address];  Service: Urology;  Laterality: N/A;  1hr    INJECTION FOR SENTINEL NODE IDENTIFICATION Right 10/29/2020    Procedure: INJECTION, FOR SENTINEL NODE IDENTIFICATION;  Surgeon: Baylee Kevin MD;  Location: [address];  Service: General;  Laterality: Right;    INJECTION OF JOINT Right 10/10/2019    Procedure: Injection, Joint RIGHT ILIOPSOAS BURSA/TENDON INJECTION AND RIGHT GLUTEAL TENDON INJECTION WITH STEROID AND LIDOCAINE;  Surgeon: Guillaume Rico MD;  Location: [facility];  Service: Pain Management;  Laterality: Right;  NEEDS CONSENT    INSERTION OF BREAST TISSUE EXPANDER Bilateral 10/29/2020    Procedure: INSERTION, TISSUE EXPANDER, BREAST;  Surgeon: Scottie Johnson MD;  Location: [address];  Service: Plastics;  Laterality: Bilateral;  Right breast: 1082 g  Left breast: 1076 g    LIPOSUCTION Bilateral 2/11/2021    Procedure: LIPOSUCTION;  Surgeon: Scottie Johnson MD;  Location: [address];  Service: Plastics;  Laterality: Bilateral;    mediastenoscopy      REPLACEMENT OF IMPLANT OF BREAST Bilateral 2/11/2021    Procedure: REPLACEMENT, IMPLANT, BREAST;  Surgeon: Scottie Johnson MD;  Location: [address];  Service: Plastics;  Laterality: Bilateral;    SENTINEL LYMPH NODE BIOPSY Right 10/29/2020    Procedure: BIOPSY, LYMPH NODE, SENTINEL;  Surgeon: Baylee Kevin MD;  Location: [address];  Service: General;  Laterality: Right;    TONSILLECTOMY N/A 1970    TUBAL LIGATION         Time Tracking:     OT  Date of Treatment: 09/17/23  OT Start Time: 1431  OT Stop Time: 1440  OT Total Time (min): 9 min    Billable Minutes:Evaluation 9    9/17/2023

## 2023-09-17 NOTE — SUBJECTIVE & OBJECTIVE
Past Medical History:   Diagnosis Date    Anemia     Anemia     Controlled type 2 diabetes mellitus without complication, without long-term current use of insulin 11/30/2021    COPD (chronic obstructive pulmonary disease)     Depression     Diverticulitis     Fatty liver     GERD (gastroesophageal reflux disease)     Hyperlipidemia     Hypertension     Pancreatitis     Peptic ulcer disease     Polysubstance abuse     Posterior reversible encephalopathy syndrome     Sarcoidosis of lung     Sarcoidosis of lung     over 30 yrs ago    Seizures     7/2017    Suicide attempt     Suicide ideation        Past Surgical History:   Procedure Laterality Date    APPENDECTOMY      BILATERAL MASTECTOMY Bilateral 10/29/2020    Procedure: MASTECTOMY, BILATERAL;  Surgeon: Baylee Kevin MD;  Location: [address];  Service: General;  Laterality: Bilateral;    BREAST REVISION SURGERY Bilateral 2/11/2021    Procedure: BREAST REVISION SURGERY;  Surgeon: Scottie Johnson MD;  Location: [facility] OR [address] [place] [address];  Service: Plastics;  Laterality: Bilateral;    COLONOSCOPY N/A 7/28/2017    Procedure: COLONOSCOPY;  Surgeon: Aaron Alvarado MD;  Location: [facility];  Service: Endoscopy;  Laterality: N/A;    ESOPHAGOGASTRODUODENOSCOPY  10/7/2016, 11/6/2014    2016 - gastritis, duodenitis, 2014 erosive gastritis    ESOPHAGOGASTRODUODENOSCOPY N/A 2/11/2020    Procedure: ESOPHAGOGASTRODUODENOSCOPY (EGD);  Surgeon: Fawn Garrido MD;  Location: [facility];  Service: Endoscopy;  Laterality: N/A;    ESOPHAGOGASTRODUODENOSCOPY N/A 4/19/2021    Procedure: EGD (ESOPHAGOGASTRODUODENOSCOPY);  Surgeon: Paramjit Martino MD;  Location: [facility];  Service: Endoscopy;  Laterality: N/A;    FLEXIBLE SIGMOIDOSCOPY  11/06/2014    colitis    HYSTERECTOMY      IMPLANTATION OF PERMANENT SACRAL NERVE STIMULATOR N/A 7/12/2022    Procedure: INSERTION, NEUROSTIMULATOR, PERMANENT, SACRAL;  Surgeon: Juaquin Edwards MD;  Location: [facility] OR [address] [place] [address];  Service:  Urology;  Laterality: N/A;  1hr    INJECTION FOR SENTINEL NODE IDENTIFICATION Right 10/29/2020    Procedure: INJECTION, FOR SENTINEL NODE IDENTIFICATION;  Surgeon: Baylee Kevin MD;  [facility];  Service: General;  Laterality: Right;    INJECTION OF JOINT Right 10/10/2019    Procedure: Injection, Joint RIGHT ILIOPSOAS BURSA/TENDON INJECTION AND RIGHT GLUTEAL TENDON INJECTION WITH STEROID AND LIDOCAINE;  Surgeon: Guillaume Rico MD;  Location: [facility];  Service: Pain Management;  Laterality: Right;  NEEDS CONSENT    INSERTION OF BREAST TISSUE EXPANDER Bilateral 10/29/2020    Procedure: INSERTION, TISSUE EXPANDER, BREAST;  Surgeon: Scottie Johnson MD;  Location: [facility] OR [address] [place] [address];  Service: Plastics;  Laterality: Bilateral;  Right breast: 1082 g  Left breast: 1076 g    LIPOSUCTION Bilateral 2/11/2021    Procedure: LIPOSUCTION;  Surgeon: Scottie Johnson MD;  Location: [facility] OR [address] [place] [address];  Service: Plastics;  Laterality: Bilateral;    mediastenoscopy      REPLACEMENT OF IMPLANT OF BREAST Bilateral 2/11/2021    Procedure: REPLACEMENT, IMPLANT, BREAST;  Surgeon: Scottie Johnson MD;  Location: [address];  Service: Plastics;  Laterality: Bilateral;    SENTINEL LYMPH NODE BIOPSY Right 10/29/2020    Procedure: BIOPSY, LYMPH NODE, SENTINEL;  Surgeon: Baylee Kevin MD;  Location: [address];  Service: General;  Laterality: Right;    TONSILLECTOMY N/A 1970    TUBAL LIGATION         Review of patient's allergies indicates:   Allergen Reactions    Lortab [hydrocodone-acetaminophen] Itching    Promethazine Itching and Other (See Comments)       Current Facility-Administered Medications on File Prior to Encounter   Medication    albuterol sulfate nebulizer solution 2.5 mg     Current Outpatient Medications on File Prior to Encounter   Medication Sig    acetaminophen (TYLENOL) 500 MG tablet Take 500-1,500 mg by mouth daily as needed for Pain.    albuterol (PROVENTIL/VENTOLIN HFA) 90  mcg/actuation inhaler     anastrozole (ARIMIDEX) 1 mg Tab     ARIPiprazole (ABILIFY) 10 MG Tab Take 1 tablet by mouth once daily.    ARIPiprazole (ABILIFY) 2 MG Tab Take 1 tablet by mouth once daily.    ARIPiprazole (ABILIFY) 5 MG Tab Take 1 tablet by mouth every morning.    atorvastatin (LIPITOR) 10 MG tablet Take 1 tablet by mouth once daily.    busPIRone (BUSPAR) 15 MG tablet     butalbital-acetaminophen-caffeine -40 mg (FIORICET, ESGIC) -40 mg per tablet Take 1 tablet by mouth every 4 (four) hours as needed.    cholecalciferol, vitamin D3, (VITAMIN D3) 50 mcg (2,000 unit) Cap capsule TAKE 1 CAPSULE BY MOUTH ONCE A DAY IN THE MORNING    cloNIDine (CATAPRES) 0.1 MG tablet Take by mouth.    diazePAM (VALIUM) 5 MG tablet Take 2 tablets (10 mg total) by mouth every 8 (eight) hours for 3 days, THEN 2 tablets (10 mg total) every 12 (twelve) hours for 3 days, THEN 1 tablet (5 mg total) every 12 (twelve) hours for 3 days, THEN 1 tablet (5 mg total) once daily for 3 days, THEN 0.5 tablets (2.5 mg total) once daily for 3 days.    dicyclomine (BENTYL) 20 mg tablet Take 20 mg by mouth 4 (four) times daily.    doxepin (SINEQUAN) 150 MG Cap Take 150 mg by mouth every evening.    DULoxetine (CYMBALTA) 60 MG capsule Take 60 mg by mouth.    EScitalopram oxalate (LEXAPRO) 10 MG tablet Take 10 mg by mouth once daily.    FLUoxetine 20 MG capsule Take 3 capsules by mouth once daily.    fluticasone furoate-vilanteroL (BREO ELLIPTA) 100-25 mcg/dose diskus inhaler Inhale 1 puff into the lungs once daily. Controller    folic acid (FOLVITE) 1 MG tablet Take 1 tablet (1 mg total) by mouth once daily.    gabapentin (NEURONTIN) 600 MG tablet Take 1 tablet (600 mg total) by mouth 3 (three) times daily. for 10 days    hydroCHLOROthiazide (HYDRODIURIL) 25 MG tablet     hydrOXYzine pamoate (VISTARIL) 50 MG Cap TAKE ONE CAPSULE BY MOUTH EVERY 6 HOURS AS NEEDED FOR ANXIETY AND/OR EVERY NIGHT AT BEDTIME AS NEEDED FOR INSOMNIA     ipratropium (ATROVENT HFA) 17 mcg/actuation inhaler Inhale 2 puffs into the lungs every 6 (six) hours as needed.    letrozole (FEMARA) 2.5 mg Tab     levothyroxine (SYNTHROID) 50 MCG tablet TAKE 1 TABLET(50 MCG) BY MOUTH BEFORE BREAKFAST    lisinopriL (PRINIVIL,ZESTRIL) 20 MG tablet Take 1 tablet (20 mg total) by mouth once daily.    loperamide (IMODIUM) 2 mg capsule Take 2 mg by mouth 4 (four) times daily as needed for Diarrhea (Per package directions).    losartan (COZAAR) 25 MG tablet Take 1 tablet by mouth once daily.    melatonin (MELATIN) Take by mouth nightly as needed for Insomnia.    metFORMIN (GLUCOPHAGE-XR) 500 MG ER 24hr tablet Take 2 tablets (1,000 mg total) by mouth 2 (two) times daily with meals.    metoprolol succinate (TOPROL-XL) 50 MG 24 hr tablet     mirtazapine (REMERON) 30 MG tablet     multivitamin Tab Take 1 tablet by mouth once daily.    nabumetone (RELAFEN) 500 MG tablet Take 500 mg by mouth 2 (two) times daily.    ondansetron (ZOFRAN-ODT) 4 MG TbDL Take 1 tablet (4 mg total) by mouth every 6 (six) hours as needed (nausea/vomiting).    pantoprazole (PROTONIX) 40 MG tablet Take 40 mg by mouth once daily.    propranoloL (INDERAL) 20 MG tablet Take 20 mg by mouth 2 (two) times daily.    thiamine 100 MG tablet Take 1 tablet (100 mg total) by mouth once daily.    traZODone (DESYREL) 300 MG tablet Take 1 tablet by mouth every evening.     Family History       Problem Relation (Age of Onset)    Breast cancer Maternal Aunt, Daughter    Colon cancer Maternal Uncle    Diabetes Father, Mother    Heart attack Father    Hypertension Father, Mother          Tobacco Use    Smoking status: Every Day     Current packs/day: 0.00     Average packs/day: 0.5 packs/day for 30.0 years (15.0 ttl pk-yrs)     Types: Vaping with nicotine, Cigarettes     Start date: 1991     Last attempt to quit: 2021     Years since quittin.6    Smokeless tobacco: Never    Tobacco comments:     Patient is currently smoking  10 cigarettes a day, declines nicotine patches   Substance and Sexual Activity    Alcohol use: Yes     Comment: vodka daily (half a regular bottle) for 4 days    Drug use: Yes     Types: Marijuana     Comment: gummies    Sexual activity: Yes     Birth control/protection: Surgical     Review of Systems   Constitutional:  Negative for chills and fever.   HENT:  Negative for congestion and sore throat.    Eyes:  Negative for pain and redness.   Respiratory:  Positive for cough. Negative for shortness of breath.    Cardiovascular:  Negative for chest pain and leg swelling.   Gastrointestinal:  Negative for abdominal pain, diarrhea, nausea and vomiting.   Genitourinary:  Negative for difficulty urinating and dysuria.   Musculoskeletal:  Negative for back pain and gait problem.   Skin:  Negative for rash and wound.   Neurological:  Positive for tremors and headaches.   Psychiatric/Behavioral:  Negative for agitation and behavioral problems.      Objective:     Vital Signs (Most Recent):  Temp: 98.9 °F (37.2 °C) (09/16/23 2347)  Pulse: 108 (09/17/23 0147)  Resp: 20 (09/16/23 2347)  BP: (!) 171/75 (09/16/23 2347)  SpO2: (!) 93 % (09/16/23 2347) Vital Signs (24h Range):  Temp:  [97.9 °F (36.6 °C)-98.9 °F (37.2 °C)] 98.9 °F (37.2 °C)  Pulse:  [] 108  Resp:  [16-20] 20  SpO2:  [93 %-100 %] 93 %  BP: (150-197)/(67-86) 171/75     Weight: 79.1 kg (174 lb 6.1 oz)  Body mass index is 28.15 kg/m².     Physical Exam  Vitals and nursing note reviewed.   Constitutional:       General: She is not in acute distress.     Appearance: She is not ill-appearing.      Comments: Ambulated to bathroom and back  Resting tremors   HENT:      Head: Normocephalic and atraumatic.      Nose: No congestion or rhinorrhea.   Eyes:      General: No scleral icterus.        Right eye: No discharge.         Left eye: No discharge.   Cardiovascular:      Rate and Rhythm: Regular rhythm. Tachycardia present.   Pulmonary:      Effort: Pulmonary effort is  "normal. No respiratory distress.      Breath sounds: Wheezing present.   Abdominal:      Palpations: Abdomen is soft.      Tenderness: There is no abdominal tenderness.   Musculoskeletal:      Right lower leg: No edema.      Left lower leg: No edema.   Skin:     General: Skin is warm and dry.   Neurological:      Mental Status: She is alert and oriented to person, place, and time. Mental status is at baseline.   Psychiatric:         Mood and Affect: Affect is flat.         Behavior: Behavior is withdrawn.                Significant Labs: All pertinent labs within the past 24 hours have been reviewed.  Bilirubin:   Recent Labs   Lab 09/16/23  1625   BILITOT 0.6     Blood Culture: No results for input(s): "LABBLOO" in the last 48 hours.  BMP:   Recent Labs   Lab 09/16/23  1625   GLU 57*      K 5.4*   CL 97   CO2 16*   BUN 17   CREATININE 0.8   CALCIUM 9.2   MG 1.9     CBC:   Recent Labs   Lab 09/16/23  1625   WBC 48.96*   HGB 11.9*   HCT 38.1   PLT 89*     CMP:   Recent Labs   Lab 09/16/23  1625      K 5.4*   CL 97   CO2 16*   GLU 57*   BUN 17   CREATININE 0.8   CALCIUM 9.2   PROT 7.8   ALBUMIN 4.5   BILITOT 0.6   ALKPHOS 73   AST 41*   ALT 26   ANIONGAP 24*     Lactic Acid:   Recent Labs   Lab 09/16/23  1721 09/16/23  2129   LACTATE 3.1* 1.4     Magnesium:   Recent Labs   Lab 09/16/23  1625   MG 1.9     POCT Glucose:   Recent Labs   Lab 09/16/23  2131 09/17/23  0039   POCTGLUCOSE 81 66*     Troponin:   Recent Labs   Lab 09/16/23  1625   TROPONINI 0.033*     Urine Culture: No results for input(s): "LABURIN" in the last 48 hours.  Urine Studies:   Recent Labs   Lab 09/16/23  1744   COLORU Yellow   APPEARANCEUA Clear   PHUR 6.0   SPECGRAV 1.015   PROTEINUA Trace*   GLUCUA Negative   KETONESU 2+*   BILIRUBINUA Negative   OCCULTUA Negative   NITRITE Negative   UROBILINOGEN Negative   LEUKOCYTESUR Negative       Significant Imaging: I have reviewed and interpreted all pertinent imaging results/findings within " the past 24 hours.

## 2023-09-17 NOTE — HPI
"Patient is a difficult historian, doesn't want to engage much, doesn't remember her meds, alert and oriented.   Per NP Arnaldo -     According to ED provider note:  Earl Abdul is a 63 year old lady with hx of alcohol use disorder with hx of prior seizures, alcohol withdrawal, depression with suicide attempt 2017, non-insulin dependent DM, COPD, GERD, fibromyalgia, sarcoidosis, breast Ca, CLL (no current treatment), HTN, HLD, hypothyroidism presenting to ED by EMS for alcohol intoxication. Per patient's son, patient has been binge drinking for the past 4 days after recent alcohol rehab in [place] for 1 month. Patient's son reports that he checked on her throughout the day and she had gotten progressively intoxicated. Patient reports drinking vodka today but unable to say how much. Patient also reports fall (unknown when) with knee pain. Patient also reports a cough with SOB.     Afebrile, , RR 17, /76, Pox 96% on RA, then placed on 2L/min NC for slight desaturation to 95%. Covid and Flu negative. Leukocytosis 48K, lactate 3.1, procal normal. H/H 11.9/38.1 (baseline), Plt 89 (baseline thrombocytopenia), Alb WNL. K 5.4, Na 137, AG 24, Cr 0.8, Glucose 57. BNP WNL, Trop I 0.033. UDS positive for alcohol only. UA with no nitrites, leuks. EKG with sinus tachycardia. CXR with no acute abnormality. CTA Chest with no PE, but some lung base opacities and atelectasis possibly related to nonspecific pneumonitis. See full report. In ED: Patient was given 2.4L NaCl 0.9% fluid bolus in total, thiamine 500mg IV, folic acid 1mg IV, ondansetron 4mg IV x 2, ketoroalc 10mg IV x 1, lorazepam 2mg IV, diazepam 5mg IV x 2. Ceftriaxone 2g IV and azithromycin 500mg IV was also given to cover for pneumonia.     Patient replies "alcohol" when asked why she is in the hospital. Difficult historian, does not volunteer information, gives "yes, no" answers. Says no to SOB and cough but Pox 93% and was obviously coughing. Probed " "about fall, she reports that she fell down the stairs, did not know when, declined HI, declined any other injuries (patient ambulated to bathroom". Denies fever, chills, dysuria, abdominal pain, nausea, vomiting. Has resting tremors.     Patient is admitted to Hospital Medicine for management of alcohol intoxication, risk for withdrawal and sepsis likely due to respiratory source.   "

## 2023-09-17 NOTE — PLAN OF CARE
[facility] - Med Surg ([address])  Initial Discharge Assessment       Primary Care Provider: Andrew Rodriguez MD    Admission Diagnosis: Lactic acidosis [E87.20]  Metabolic acidosis [E87.20]  Tachycardia [R00.0]  Acute alcoholic intoxication with complication [F10.929]    Admission Date: 9/16/2023  Expected Discharge Date:     Transition of Care Barriers: Substance Abuse    Payor: [employer] / Plan: [facility] ALL SAVERS / Product Type: Commercial /     Extended Emergency Contact Information  Primary Emergency Contact: Henrique Abdul  Address: [address] [place] [address]            [place] [address] [facility]  Home Phone: [phone]  Relation: Spouse  Secondary Emergency Contact: Carlos Suazo  Mobile Phone: [phone]  Relation: Son    Discharge Plan A: Home with family         [id number] - [place], [place] - [address] AT [facility]  [email or web address]  [place] [place] [address]  Phone: [phone] Fax: [phone]      Initial Assessment (most recent)       Adult Discharge Assessment - 09/17/23 1043          Discharge Assessment    Assessment Type Discharge Planning Assessment     Confirmed/corrected address, phone number and insurance Yes     Confirmed Demographics Correct on Facesheet     Source of Information patient     People in Home spouse     Do you expect to return to your current living situation? Yes     Do you have help at home or someone to help you manage your care at home? Yes     Prior to hospitilization cognitive status: Alert/Oriented     Current cognitive status: Alert/Oriented     Equipment Currently Used at Home none     Readmission within 30 days? No     Patient currently being followed by outpatient case management? No     Do you currently have service(s) that help you manage your care at home? No     Do you take prescription medications? Yes     Do you have prescription coverage? Yes     Do you have any problems affording any of your  prescribed medications? No     Is the patient taking medications as prescribed? yes     Who is going to help you get home at discharge?      How do you get to doctors appointments? car, drives self     Are you on dialysis? No     DME Needed Upon Discharge  none     Discharge Plan discussed with: Patient     Transition of Care Barriers Substance Abuse     Discharge Plan A Home with family

## 2023-09-17 NOTE — ASSESSMENT & PLAN NOTE
- ED reports SOB and cough but patient denies SOB  - Afebrile, , RR 17, /76, Pox 96% on RA, then placed on 2L/min NC for slight desaturation to 95%. Covid and Flu negative. Leukocytosis 48K, lactate 3.1, procal normal.   - UA with no nitrites, leuks. CXR with no acute abnormality. CTA Chest with no PE, but some lung base opacities and atelectasis possibly related to nonspecific pneumonitis. See full report.   - In ED: Patient was given 2.4L NaCl 0.9% fluid bolus in total, Ceftriaxone 2g IV and azithromycin 500mg IV    Plan:  - Continue Ceftriaxone 1g IV daily, azithromycin 250mg PO x 4 more doses  - Duonebs Q4 wake  - Oxygen as needed to keep Pox > 92% (patient has COPD)  - Trend CBC, strict intake and output

## 2023-09-17 NOTE — PLAN OF CARE
No OT goals established.      Problem: Occupational Therapy  Goal: Occupational Therapy Goal  Outcome: Met     Initial OT eval completed.  No DME in use PTA.  No DME needs anticipated.  No acute care or post acute OT needs identified.  Recommend post acute substance abuse facility.  To discontinue OT.  Please re-consult if changes occur.

## 2023-09-17 NOTE — ASSESSMENT & PLAN NOTE
- ED reports SOB and cough but patient denies SOB  - Afebrile, , RR 17, /76, Pox 96% on RA, then placed on 2L/min NC for slight desaturation to 95%. Covid and Flu negative. Leukocytosis 48K, lactate 3.1 (rpt lactate normalized), procal normal.   - UA with no nitrites, leuks. CXR with no acute abnormality. CTA Chest with no PE, but some lung base opacities and atelectasis possibly related to nonspecific pneumonitis. See full report.   - In ED: Patient was given 2.4L NaCl 0.9% fluid bolus in total, Ceftriaxone 2g IV and azithromycin 500mg IV    Unsure if labs, signs and ysmtpoms related to infection vs alcohol intoxication and dehydration. Will conitnue with abx for now given unclear picture.     Plan:  - Continue Ceftriaxone 1g IV daily, azithromycin 250mg PO x 4 more doses  - Duonebs Q4 wake  - Oxygen as needed to keep Pox > 92% (patient has COPD)  - Trend CBC, strict intake and output

## 2023-09-17 NOTE — ASSESSMENT & PLAN NOTE
Hx of HTN, HLD  Chronic, hypertensive  Patient does not know what meds she is taking. Night NP called patient's son who lives with her, went to  (1.30am)    - Med recs with pharmacy on Monday   - Given HTN will start some meds on home med list   - Hydralazine 10mg IV Q6 PRN for SBP > 180, DBP >100

## 2023-09-17 NOTE — ASSESSMENT & PLAN NOTE
Alcoholic intoxication  Risk for alcohol withdrawal  Risk is decreased as she has only had 4 days of drinking   Alcoholic ketoacidosis - ketonuria   Hyperkalemia - resolved   Thrombocytopenia 2/2 alcoholism   Hx of alcohol use disorder with hx of prior seizures, alcohol withdrawal, depression with suicide attempt 2017   - presenting to ED by EMS for alcohol intoxication. Per patient's son, patient has been binge drinking for the past 4 days after recent alcohol rehab in [place] for 1 month, patient reports half a pint of vodka, last drink today  - Patient's son reports that he checked on her throughout the day and she had gotten progressively intoxicated.    - Patient also reports fall down stairs (unknown when) with knee pain to ED provider  - H/H 11.9/38.1 (baseline), Plt 89 (baseline thrombocytopenia), Alb WNL. K 5.4, Na 137, AG 24, Cr 0.8.  UDS positive for alcohol only. UA with ketonuria. EKG with sinus tachycardia.       Plan:  - Continue thiamine 100mg PO daily, folic acid 1mg PO daily, MVI PO daily  - CIWA Q4  - Diazepam 5mg IV Q4 PRN if CIWA >15  - CT brain  - PT/OT

## 2023-09-17 NOTE — SUBJECTIVE & OBJECTIVE
Interval History: Pt reports he feels better. Labs have also improved after fluids. Cont fluid resus and CT brain for fall down stairs. PT and OT eval.     Review of Systems   Constitutional:  Positive for activity change (consuming alcohol for past 4 days). Negative for fever.   Respiratory:  Negative for cough.    Cardiovascular:  Negative for chest pain and leg swelling.   Gastrointestinal:  Negative for abdominal distention, abdominal pain and diarrhea.   Genitourinary:  Negative for dysuria.   Skin:  Negative for rash.   Psychiatric/Behavioral:  Negative for agitation and confusion.      Objective:     Vital Signs (Most Recent):  Temp: 98.2 °F (36.8 °C) (09/17/23 0721)  Pulse: 92 (09/17/23 0721)  Resp: 20 (09/17/23 0721)  BP: (!) 171/76 (09/17/23 0721)  SpO2: (!) 94 % (09/17/23 0800) Vital Signs (24h Range):  Temp:  [97.9 °F (36.6 °C)-98.9 °F (37.2 °C)] 98.2 °F (36.8 °C)  Pulse:  [] 92  Resp:  [16-20] 20  SpO2:  [93 %-100 %] 94 %  BP: (150-197)/(67-86) 171/76     Weight: 79.1 kg (174 lb 6.1 oz)  Body mass index is 28.15 kg/m².    Intake/Output Summary (Last 24 hours) at 9/17/2023 0932  Last data filed at 9/16/2023 2145  Gross per 24 hour   Intake 1400 ml   Output --   Net 1400 ml         Physical Exam  Constitutional:       General: She is not in acute distress.  Pulmonary:      Effort: No respiratory distress.      Breath sounds: No wheezing.   Abdominal:      General: There is no distension.      Tenderness: There is no abdominal tenderness.   Musculoskeletal:      Right lower leg: No edema.      Left lower leg: No edema.   Neurological:      General: No focal deficit present.      Mental Status: She is oriented to person, place, and time.      Comments: But does not recall home meds              Significant Labs: All pertinent labs within the past 24 hours have been reviewed.    Significant Imaging: I have reviewed all pertinent imaging results/findings within the past 24 hours.

## 2023-09-17 NOTE — DISCHARGE INSTRUCTIONS
Please abstain from alcohol, you have come so far! You can do it! - you have resources if you feel you are unable to do so   Please take 3 more days of antibiotics  Please follow up with your PCP   When you see your PCP, please make a list of the medications you take at home as you do not know them

## 2023-09-17 NOTE — ED NOTES
Patient's left 2 bags (purses) in ER. Patient's bags (2) walked to 3 south and given to her nurse Aayush at nurse's station.

## 2023-09-17 NOTE — ASSESSMENT & PLAN NOTE
Last A1c reviewed-   Lab Results   Component Value Date    HGBA1C 4.7 06/16/2023     Most recent fingerstick glucose reviewed-   Recent Labs   Lab 09/16/23  2131 09/17/23  0039 09/17/23  0707   POCTGLUCOSE 81 66* 87     Current correctional scale  Low  Maintain anti-hyperglycemic dose as follows-   Antihyperglycemics (From admission, onward)    Start     Stop Route Frequency Ordered    09/17/23 0103  insulin aspart U-100 pen 0-5 Units         -- SubQ Before meals & nightly PRN 09/17/23 0006

## 2023-09-17 NOTE — NURSING
Nurses Note -- 4 Eyes      9/16/2023   11:49 PM      Skin assessed during: Admit      [x] No Altered Skin Integrity Present    []Prevention Measures Documented      [] Yes- Altered Skin Integrity Present or Discovered   [] LDA Added if Not in Epic (Describe Wound)   [] New Altered Skin Integrity was Present on Admit and Documented in LDA   [] Wound Image Taken    Wound Care Consulted? No    Attending Nurse:  Aayush Shepherd RN/Staff Member:   Morgan

## 2023-09-17 NOTE — PT/OT/SLP PROGRESS
"Physical Therapy  Not Seen    Patient Name:  Earl Abdul   MRN:  [id number]    Patient not seen today secondary to Patient unwilling to participate, Pain (reports "I just don't feel like doing that right now" and generalized body pain). Will follow-up as schedule allows.    "

## 2023-09-18 VITALS
RESPIRATION RATE: 14 BRPM | TEMPERATURE: 98 F | BODY MASS INDEX: 28.15 KG/M2 | HEART RATE: 88 BPM | DIASTOLIC BLOOD PRESSURE: 77 MMHG | WEIGHT: 174.38 LBS | OXYGEN SATURATION: 93 % | SYSTOLIC BLOOD PRESSURE: 165 MMHG

## 2023-09-18 LAB
ANISOCYTOSIS BLD QL SMEAR: SLIGHT
BASOPHILS # BLD AUTO: ABNORMAL K/UL (ref 0–0.2)
BASOPHILS NFR BLD: 0 % (ref 0–1.9)
DIFFERENTIAL METHOD: ABNORMAL
EOSINOPHIL # BLD AUTO: ABNORMAL K/UL (ref 0–0.5)
EOSINOPHIL NFR BLD: 0.5 % (ref 0–8)
ERYTHROCYTE [DISTWIDTH] IN BLOOD BY AUTOMATED COUNT: 15.8 % (ref 11.5–14.5)
HCT VFR BLD AUTO: 38.8 % (ref 37–48.5)
HGB BLD-MCNC: 11.9 G/DL (ref 12–16)
IMM GRANULOCYTES # BLD AUTO: ABNORMAL K/UL (ref 0–0.04)
IMM GRANULOCYTES NFR BLD AUTO: ABNORMAL % (ref 0–0.5)
LYMPHOCYTES # BLD AUTO: ABNORMAL K/UL (ref 1–4.8)
LYMPHOCYTES NFR BLD: 79 % (ref 18–48)
MCH RBC QN AUTO: 27.3 PG (ref 27–31)
MCHC RBC AUTO-ENTMCNC: 30.7 G/DL (ref 32–36)
MCV RBC AUTO: 89 FL (ref 82–98)
MONOCYTES # BLD AUTO: ABNORMAL K/UL (ref 0.3–1)
MONOCYTES NFR BLD: 1.5 % (ref 4–15)
NEUTROPHILS NFR BLD: 19 % (ref 38–73)
NRBC BLD-RTO: 0 /100 WBC
PATH REV BLD -IMP: NORMAL
PLATELET # BLD AUTO: 151 K/UL (ref 150–450)
PLATELET BLD QL SMEAR: ABNORMAL
PMV BLD AUTO: 9.6 FL (ref 9.2–12.9)
POLYCHROMASIA BLD QL SMEAR: ABNORMAL
RBC # BLD AUTO: 4.36 M/UL (ref 4–5.4)
T4 FREE SERPL-MCNC: 1.06 NG/DL (ref 0.71–1.51)
WBC # BLD AUTO: 24.31 K/UL (ref 3.9–12.7)

## 2023-09-18 PROCEDURE — 96375 TX/PRO/DX INJ NEW DRUG ADDON: CPT

## 2023-09-18 PROCEDURE — 63600175 PHARM REV CODE 636 W HCPCS: Performed by: STUDENT IN AN ORGANIZED HEALTH CARE EDUCATION/TRAINING PROGRAM

## 2023-09-18 PROCEDURE — 96374 THER/PROPH/DIAG INJ IV PUSH: CPT

## 2023-09-18 PROCEDURE — 25000003 PHARM REV CODE 250: Performed by: STUDENT IN AN ORGANIZED HEALTH CARE EDUCATION/TRAINING PROGRAM

## 2023-09-18 RX ORDER — AMOXICILLIN AND CLAVULANATE POTASSIUM 875; 125 MG/1; MG/1
1 TABLET, FILM COATED ORAL
Status: COMPLETED | OUTPATIENT
Start: 2023-09-18 | End: 2023-09-18

## 2023-09-18 RX ORDER — HALOPERIDOL 5 MG/ML
5 INJECTION INTRAMUSCULAR
Status: COMPLETED | OUTPATIENT
Start: 2023-09-18 | End: 2023-09-18

## 2023-09-18 RX ORDER — LORAZEPAM 2 MG/ML
2 INJECTION INTRAMUSCULAR
Status: COMPLETED | OUTPATIENT
Start: 2023-09-18 | End: 2023-09-18

## 2023-09-18 RX ADMIN — HALOPERIDOL LACTATE 5 MG: 5 INJECTION, SOLUTION INTRAMUSCULAR at 01:09

## 2023-09-18 RX ADMIN — AMOXICILLIN AND CLAVULANATE POTASSIUM 1 TABLET: 875; 125 TABLET, FILM COATED ORAL at 12:09

## 2023-09-18 RX ADMIN — LORAZEPAM 2 MG: 2 INJECTION INTRAMUSCULAR; INTRAVENOUS at 01:09

## 2023-09-18 NOTE — ED PROVIDER NOTES
"Encounter Date: 9/17/2023       History     Chief Complaint   Patient presents with    Psychiatric Evaluation     + ETOH, 4 glasses of vodka, attempted to take 5 trazodone but  caught her and made her spit them out, Denies SI/HI      63-year-old female with history of alcohol use disorder with hx of prior seizures, alcohol withdrawal, depression with suicide attempt 2017, non-insulin dependent DM, COPD, GERD, fibromyalgia, sarcoidosis, breast Ca, CLL (no current treatment), HTN, HLD, hypothyroidism presenting to ED after suicide attempt. Per patient's , patient was recently discharged from alcohol rehab in [place] approximately 1 week ago.  Patient began binge drinking for 4 days and was hospitalized at [facility] where she had a significant leukocytosis and was prophylactically treated for community-acquired pneumonia due to a cough.  Patient was discharged earlier today from [facility] with a 3 day course of antibiotics.  Her  reports that patient was home alone and had likely began drinking.  When their son went to check on her, he states that patient was obviously intoxicated.  Patient's  came home and began arguing with the patient.   states that he told the patient he can no longer live with her if she continues to drink.  He then reports that patient took a handful of trazodone and stiff them in her mouth.   was able to retrieve them from her mouth.  Patient states that she took them to "get out of it".  She is a poor historian and denies any alcohol use today despite being told that her labs were positive for alcohol.  Patient is stating that she had requested to come to the ED because she was feeling lightheaded with myalgias.  She states her symptoms have continued since her hospitalization.  She denies taking any of her medications.       Review of patient's allergies indicates:   Allergen Reactions    Lortab [hydrocodone-acetaminophen] Itching    Promethazine " Itching and Other (See Comments)     Past Medical History:   Diagnosis Date    Anemia     Anemia     Controlled type 2 diabetes mellitus without complication, without long-term current use of insulin 11/30/2021    COPD (chronic obstructive pulmonary disease)     Depression     Diverticulitis     Fatty liver     GERD (gastroesophageal reflux disease)     Hyperlipidemia     Hypertension     Pancreatitis     Peptic ulcer disease     Polysubstance abuse     Posterior reversible encephalopathy syndrome     Sarcoidosis of lung     Sarcoidosis of lung     over 30 yrs ago    Seizures     7/2017    Suicide attempt     Suicide ideation      Past Surgical History:   Procedure Laterality Date    APPENDECTOMY      BILATERAL MASTECTOMY Bilateral 10/29/2020    Procedure: MASTECTOMY, BILATERAL;  Surgeon: Baylee Kevin MD;  Location: [facility] OR [address] [place] [address];  Service: General;  Laterality: Bilateral;    BREAST REVISION SURGERY Bilateral 2/11/2021    Procedure: BREAST REVISION SURGERY;  Surgeon: Scottie Johnson MD;  Location: [facility] OR [address] [place] [address];  Service: Plastics;  Laterality: Bilateral;    COLONOSCOPY N/A 7/28/2017    Procedure: COLONOSCOPY;  Surgeon: Aaron Alvarado MD;  Location: [facility];  Service: Endoscopy;  Laterality: N/A;    ESOPHAGOGASTRODUODENOSCOPY  10/7/2016, 11/6/2014    2016 - gastritis, duodenitis, 2014 erosive gastritis    ESOPHAGOGASTRODUODENOSCOPY N/A 2/11/2020    Procedure: ESOPHAGOGASTRODUODENOSCOPY (EGD);  Surgeon: Fawn Garrido MD;  Location: [facility];  Service: Endoscopy;  Laterality: N/A;    ESOPHAGOGASTRODUODENOSCOPY N/A 4/19/2021    Procedure: EGD (ESOPHAGOGASTRODUODENOSCOPY);  Surgeon: Paramjit Martino MD;  Location: [facility];  Service: Endoscopy;  Laterality: N/A;    FLEXIBLE SIGMOIDOSCOPY  11/06/2014    colitis    HYSTERECTOMY      IMPLANTATION OF PERMANENT SACRAL NERVE STIMULATOR N/A 7/12/2022    Procedure: INSERTION, NEUROSTIMULATOR, PERMANENT, SACRAL;  Surgeon: Juaquin Edwards MD;   Location: [address];  Service: Urology;  Laterality: N/A;  1hr    INJECTION FOR SENTINEL NODE IDENTIFICATION Right 10/29/2020    Procedure: INJECTION, FOR SENTINEL NODE IDENTIFICATION;  Surgeon: Baylee Kevin MD;  Location: [address];  Service: General;  Laterality: Right;    INJECTION OF JOINT Right 10/10/2019    Procedure: Injection, Joint RIGHT ILIOPSOAS BURSA/TENDON INJECTION AND RIGHT GLUTEAL TENDON INJECTION WITH STEROID AND LIDOCAINE;  Surgeon: Guillaume Rico MD;  Location: [facility];  Service: Pain Management;  Laterality: Right;  NEEDS CONSENT    INSERTION OF BREAST TISSUE EXPANDER Bilateral 10/29/2020    Procedure: INSERTION, TISSUE EXPANDER, BREAST;  Surgeon: Scottie Johnson MD;  Location: [address];  Service: Plastics;  Laterality: Bilateral;  Right breast: 1082 g  Left breast: 1076 g    LIPOSUCTION Bilateral 2/11/2021    Procedure: LIPOSUCTION;  Surgeon: Scottie Johnson MD;  Location: [address];  Service: Plastics;  Laterality: Bilateral;    mediastenoscopy      REPLACEMENT OF IMPLANT OF BREAST Bilateral 2/11/2021    Procedure: REPLACEMENT, IMPLANT, BREAST;  Surgeon: Scottie Johnson MD;  Location: [address];  Service: Plastics;  Laterality: Bilateral;    SENTINEL LYMPH NODE BIOPSY Right 10/29/2020    Procedure: BIOPSY, LYMPH NODE, SENTINEL;  Surgeon: Baylee Kevin MD;  Location: [address];  Service: General;  Laterality: Right;    TONSILLECTOMY N/A 1970    TUBAL LIGATION       Family History   Problem Relation Age of Onset    Heart attack Father     Diabetes Father     Hypertension Father     Diabetes Mother     Hypertension Mother     Breast cancer Maternal Aunt     Colon cancer Maternal Uncle     Breast cancer Daughter     Ovarian cancer Neg Hx     Cancer Neg Hx      Social History     Tobacco Use    Smoking status: Every Day     Current packs/day: 0.00     Average packs/day: 0.5 packs/day for 30.0 years (15.0 ttl pk-yrs)     Types:  Vaping with nicotine, Cigarettes     Start date: 1991     Last attempt to quit: 2021     Years since quittin.6    Smokeless tobacco: Never    Tobacco comments:     Patient is currently smoking 10 cigarettes a day, declines nicotine patches   Substance Use Topics    Alcohol use: Yes     Comment: vodka daily (half a regular bottle) for 4 days    Drug use: Yes     Types: Marijuana     Comment: gummies     Review of Systems   Constitutional:  Positive for fatigue. Negative for fever.   HENT:  Negative for congestion and rhinorrhea.    Respiratory:  Positive for cough. Negative for shortness of breath.    Cardiovascular:  Negative for chest pain.   Gastrointestinal:  Negative for abdominal pain and vomiting.   Genitourinary:  Negative for dysuria.   Musculoskeletal:  Positive for myalgias.   Skin:  Negative for rash and wound.   Neurological:  Positive for light-headedness.       Physical Exam     Initial Vitals [23 2201]   BP Pulse Resp Temp SpO2   139/69 80 18 98.6 °F (37 °C) 98 %      MAP       --         Physical Exam    Nursing note and vitals reviewed.  Constitutional: She is not diaphoretic. No distress.   HENT:   Head: Normocephalic and atraumatic.   Mouth/Throat: Oropharynx is clear and moist.   Eyes: Conjunctivae and EOM are normal. Pupils are equal, round, and reactive to light.   Neck: Neck supple.   Normal range of motion.  Cardiovascular:  Normal rate, regular rhythm and normal heart sounds.           Pulmonary/Chest: Breath sounds normal. She has no wheezes. She has no rhonchi. She has no rales.   Abdominal: Abdomen is soft. She exhibits no distension. There is no abdominal tenderness.   Musculoskeletal:         General: No edema. Normal range of motion.      Cervical back: Normal range of motion and neck supple.     Neurological: She is alert and oriented to person, place, and time. She has normal strength. No cranial nerve deficit or sensory deficit.   Skin: Skin is warm and dry.  "Capillary refill takes less than 2 seconds.   Psychiatric:   +Confabulation         ED Course   Procedures  Labs Reviewed   CBC W/ AUTO DIFFERENTIAL - Abnormal; Notable for the following components:       Result Value    WBC 24.31 (*)     Hemoglobin 11.9 (*)     MCHC 30.7 (*)     RDW 15.8 (*)     Gran % 19.0 (*)     Lymph % 79.0 (*)     Mono % 1.5 (*)     All other components within normal limits   COMPREHENSIVE METABOLIC PANEL - Abnormal; Notable for the following components:    AST 43 (*)     All other components within normal limits   TSH - Abnormal; Notable for the following components:    TSH 0.346 (*)     All other components within normal limits   ALCOHOL,MEDICAL (ETHANOL) - Abnormal; Notable for the following components:    Alcohol, Serum 209 (*)     All other components within normal limits   ACETAMINOPHEN LEVEL - Abnormal; Notable for the following components:    Acetaminophen (Tylenol), Serum <3.0 (*)     All other components within normal limits   SALICYLATE LEVEL - Abnormal; Notable for the following components:    Salicylate Lvl <5.0 (*)     All other components within normal limits   URINALYSIS, REFLEX TO URINE CULTURE    Narrative:     Specimen Source->Urine   DRUG SCREEN PANEL, URINE EMERGENCY    Narrative:     Specimen Source->Urine   T4, FREE          Imaging Results              X-Ray Chest AP Portable (Final result)  Result time 09/18/23 00:55:25      Final result by Boubacar Rob MD (09/18/23 00:55:25)                   Impression:      No detrimental change when compared with 09/16/2023.      Electronically signed by: Boubacar Rob MD  Date:    09/18/2023  Time:    00:55               Narrative:    EXAMINATION:  XR CHEST AP PORTABLE    CLINICAL HISTORY:  Provided history is "  Cough, unspecified".    TECHNIQUE:  One view of the chest.    COMPARISON:  09/16/2023.    FINDINGS:  Exam quality is limited by soft tissue attenuation of the x-ray beam and portable technique.  Increased " retrocardiac attenuation is again noted, likely exaggerated by overlapping breast soft tissues/prostheses and cardiac silhouette, as similar findings were present on multiple prior studies.  Cardiac silhouette is stable.  No confluent area of consolidation.  No large pleural effusion.  No pneumothorax.                                       Medications   amoxicillin-clavulanate 875-125mg per tablet 1 tablet (1 tablet Oral Given 9/18/23 0047)   LORazepam injection 2 mg (2 mg Intravenous Given 9/18/23 0110)   haloperidol lactate injection 5 mg (5 mg Intravenous Given 9/18/23 0109)     Medical Decision Making  Patient is hemodynamically stable and well-appearing.  Her labs are notable for leukocytosis of 24 (decreased from 9/16) and ETOH level of 209.  She denies any withdrawal symptoms.  She admits to suicide attempt earlier this evening.  Patient became aggressive staff when informed that to being psychiatric facility for further evaluation management.  Haloperidol and Ativan given.  Chest x-ray shows no acute changes.  Patient medically cleared for transfer to psychiatric facility.    Amount and/or Complexity of Data Reviewed  Labs: ordered. Decision-making details documented in ED Course.  Radiology: ordered. Decision-making details documented in ED Course.    Risk  Prescription drug management.                               Clinical Impression:   Final diagnoses:  [R05.9] Cough  [T14.91XA] Suicide attempt (Primary)               Joy Rick MD  Resident  09/18/23 [id number]

## 2023-09-18 NOTE — ED NOTES
"Pt demanding for  to come to bedside. Pt informed that  may see her once she is put into ED room. Pt became agitated and yelling "I pay your salary. If it wasn't for me you wouldn't be here. I want my  now." Pt verbally redirected. Security at bedside through out interaction.   "

## 2023-09-18 NOTE — ED NOTES
Pt remains in paper scrubs, resting comfortably in stretcher. NAD. Pt aware of plan of care. PEC complete and in patient's chart. Pt belongings are removed from room, labeled, and locked away. No unnecessary equipment, cords, or wires left in room. Sitter at bedside recording Q 15 minute checks. Sitter belongings are out of room.

## 2023-09-18 NOTE — ED TRIAGE NOTES
Earl Abdul, an 63 y.o. female presents to the ED + ETOH, 4 glasses's of vodka, attempted to take 5 trazodone but  caught her and made her spit them out. Denies SI/HI.      Chief Complaint   Patient presents with    Psychiatric Evaluation     + ETOH, 4 glasses of vodka, attempted to take 5 trazodone but  caught her and made her spit them out, Denies SI/HI      Review of patient's allergies indicates:   Allergen Reactions    Lortab [hydrocodone-acetaminophen] Itching    Promethazine Itching and Other (See Comments)     Past Medical History:   Diagnosis Date    Anemia     Anemia     Controlled type 2 diabetes mellitus without complication, without long-term current use of insulin 11/30/2021    COPD (chronic obstructive pulmonary disease)     Depression     Diverticulitis     Fatty liver     GERD (gastroesophageal reflux disease)     Hyperlipidemia     Hypertension     Pancreatitis     Peptic ulcer disease     Polysubstance abuse     Posterior reversible encephalopathy syndrome     Sarcoidosis of lung     Sarcoidosis of lung     over 30 yrs ago    Seizures     7/2017    Suicide attempt     Suicide ideation       Adult Physical Assessment  LOC: Earl Abdul, 63 y.o. female verified via two identifiers.  The patient is awake, alert, oriented. Pt verbally aggressive.  APPEARANCE: Patient is clean and clothing is properly fastened.  SKIN:The skin is warm and dry, color consistent with ethnicity, patient has normal skin turgor and moist mucus membranes, skin intact, no breakdown or brusing noted.  MUSCULOSKELETAL: Patient moving all extremities well, no obvious swelling or deformities noted.  RESPIRATORY: Airway is open and patent, respirations are spontaneous, patient has a normal effort and rate, no accessory muscle use noted.  CARDIAC: Patient has a normal rate and rhythm, no periphreal edema noted in any extremity, capillary refill < 3 seconds in all extremities  ABDOMEN: Soft and non tender to  palpation, no abdominal distention noted.   NEUROLOGIC: Eyes open spontaneously, facial expression symmetrical, normal sensation in all extremities when touched with a finger. Verbally aggressive and threatening to staff and .

## 2023-09-18 NOTE — ED NOTES
Assumed pt care, she is lying on the stretcher resting NAD noted. Pt dressed in paper scrubs all belongings secured outside of the room. Pt has a signed PEC at the desk and placement to [facility] in [place]. Room checked for hazards, EDT outside of the room DVC maintained.

## 2023-09-18 NOTE — ED NOTES
Pt escorted off of the unit on a stretcher by ED and security staff. Pt's PEC, transfer form, and all belongings given to [facility] staff. Pt remained calm and cooperative for the transportation.

## 2023-09-18 NOTE — ED NOTES
Pt changed into hospital provided apparel. Belongings placed in locked closet    Pt belongings:  1 dress

## 2023-09-19 ENCOUNTER — PATIENT OUTREACH (OUTPATIENT)
Dept: ADMINISTRATIVE | Facility: CLINIC | Age: 63
End: 2023-09-19
Payer: COMMERCIAL

## 2023-09-19 NOTE — PHYSICIAN QUERY
PT Name: Earl Abdul  MR #: [id number]     DOCUMENTATION CLARIFICATION     CDS/: NARCISA Ashby, RN, CDS              Contact information:[email or web address]   This form is a permanent document in the medical record.     Query Date: September 19, 2023    By submitting this query, we are merely seeking further clarification of documentation.  Please utilize your independent clinical judgment when addressing the question(s) below.  The Medical Record contains the following:  Indicators Supporting Clinical Findings Location in Medical Record   X HR         RR          BP        Temp Vital Signs (24h Range):  Temp:  [97.9 °F (36.6 °C)-98.9 °F (37.2 °C)] 98.9 °F (37.2 °C)  Pulse:  [] 108  Resp:  [16-20] 20  SpO2:  [93 %-100 %] 93 %  BP: (150-197)/(67-86) 171/75     H&P, S. Arnaldo, NP, 9/17   X Lactic Acid          Procalcitonin lactate 3.1, procal normal   H&P, S. Arnaldo, NP, 9/17   X WBC           Bands          CRP     09/16 09/17 09/17   WBC 48.96 (H) 17.30 (H) 24.31 (H)        Lab 9/16-9/17   X Culture(s) Blood culture: No growth   Lab 9/16    AMS, Confusion, LOC, etc.      Organ Dysfunction/Failure     X Bacteremia or Sepsis / Septic Sepsis    Patient is admitted to Hospital Medicine for management of alcohol intoxication, risk for withdrawal and sepsis likely due to respiratory source.       H&PSHAMIR NP, 9/17    Dr. Phillip TREADWELL, 9/17   X Known or Suspected Source of Infection documented Ceftriaxone 2g IV and azithromycin 500mg IV was also given to cover for pneumonia  Says no to SOB and cough but Pox 93% and was obviously coughing    CTA Chest with no PE, but some lung base opacities and atelectasis possibly related to nonspecific pneumonitis   H&P, STiffanie Mcintosh NP, 9/17    (Failed) Outpatient Treatment      Medication     X Treatment Patient was given 2.4L NaCl 0.9% fluid bolus in total, Ceftriaxone 2g IV and azithromycin 500mg IV     Plan:  - Continue Ceftriaxone 1g IV daily, azithromycin  250mg PO x 4 more doses  - Duonebs Q4 wake  - Oxygen as needed to keep Pox > 92% (patient has COPD)    Ceftriaxone 2g IV and azithromycin 500mg IV was also given to cover for pneumonia    Continue Ceftriaxone 1g IV daily, azithromycin 250mg PO x 4 more doses    She will also go home with 3 more days of abx   H&P, SHAMIR Mcintosh NP, 9/17                        , Dr. Fisher, 9/17      SD, Dr. Fisher, 9/17   X Other Patient is afebrile, nontoxic appearing hemodynamically stable.  She is clinically intoxicated.    CBC with leukocytosis of approximately 49,000 with chronic thrombocytopenia.  Patient had similar elevated white count during last admission for alcohol withdrawal    Alcohol use disorder, severe, dependence  Alcoholic intoxication  Alcoholic ketoacidosis  Hyperkalemia    Improvement in WBC could be 2/2 to fluids or abx. With neg procal and no infectious sx like fever less likely to be leukocytosis in setting of infection, never the less on admission she did have reported cough and tachycardia along with leukocytosis so will continue antibiotics for now    Unsure if labs, signs and ysmtpoms related to infection vs alcohol intoxication and dehydration. Will conitnue with abx for now given unclear picture   SANDER, KHURRAM Mcgrath PA-C, 9/16              H&P, SHAMIR Mcintosh NP, 9/17          , Dr. Fisher, 9/17        Provider, please Clarify the Sepsis Diagnosis associated with above clinical findings.    [ x  ] Sepsis Ruled In     [   ] Sepsis Ruled Out     [   ] Other Infectious Disease (please specify): __________           Please document in your progress notes daily for the duration of treatment until resolved and include in your discharge summary.

## 2023-09-19 NOTE — PHYSICIAN QUERY
PT Name: Earl Abdul  MR #: [id number]     DOCUMENTATION CLARIFICATION     CDS/: NARCISA Ashby, RN, CDS               Contact information:[email or web address]   This form is a permanent document in the medical record.    Query Date: September 19, 2023    By submitting this query, we are merely seeking further clarification of documentation.  Please utilize your independent clinical judgment when addressing the question(s) below.  The Medical Record contains the following:   Indicators   Supporting Clinical Findings Location in Medical Record   X Pneumonia documented  Ceftriaxone 2g IV and azithromycin 500mg IV was also given to cover for pneumonia    H&P, S. Arnaldo, NP, 9/17   X Chest X-Ray/CT Scan  CXR with no acute abnormality. CTA Chest with no PE, but some lung base opacities and atelectasis possibly related to nonspecific pneumonitis    H&P, S. Arnaldo, NP, 9/17   X PaO2    PaCO2     O2 sat  Pox 96% on RA, then placed on 2L/min NC for slight desaturation to 95%    H&P, S. Arnaldo, NP, 9/17   X WBC  Leukocytosis 48K, lactate 3.1, procal normal    H&P, S. Arnaldo, NP, 9/17   X Vital Signs Vital Signs (24h Range):  Temp:  [97.9 °F (36.6 °C)-98.9 °F (37.2 °C)] 98.9 °F (37.2 °C)  Pulse:  [] 108  Resp:  [16-20] 20  SpO2:  [93 %-100 %] 93 %  BP: (150-197)/(67-86) 171/75    H&P, S. Arnaldo, NP, 9/17    Cultures      X Treatment   In ED: Patient was given 2.4L NaCl 0.9% fluid bolus in total, Ceftriaxone 2g IV and azithromycin 500mg IV  Plan:  - Continue Ceftriaxone 1g IV daily, azithromycin 250mg PO x 4 more doses  - Duonebs Q4 wake  - Oxygen as needed to keep Pox > 92% (patient has COPD)    H&P, S. Arnaldo, NP, 9/17   X Supplemental O2  2L/min NC for slight desaturation to 95%    H&P, S. Arnaldo, NP, 9/17    Dysphagia/Swallow study     X Other  Says no to SOB and cough but Pox 93% and was obviously coughing     Wheezing, cough    Chronic respiratory failure with hypoxia  Hx of COPD (PFT 3/2022: +obstruction with FEV1  67% of predicted)  Obesity with alveolar hypoventilation, GG lung nodule, sarcoidosis, nicotine dependence current smoker (10 cigarettes a day, on and off since teenage years     Pt doing well this AM on room air. Improvement in WBC could be 2/2 to fluids or abx. With neg procal and no infectious sx like fever less likely to be leukocytosis in setting of infection, never the less on admission she did have reported cough and tachycardia along with leukocytosis so will continue antibiotics for now.      Some hypoxia in ED requiring 2L NC but resolved now. Pt denied cough but admitting provider could appreciate cough      Unsure if labs, signs and ysmtpoms related to infection vs alcohol intoxication and dehydration. Will conitnue with abx for now given unclear picture.        H&P, SHAMIR Mcintosh NP, 9/17                         , Dr. Fisher, 9/17     Provider, please clarify the Pneumonia diagnosis related to the above clinical indicators:    [   ] Pneumonia Ruled In   [   ] Pneumonia Ruled Out   [   ] Other respiratory diagnosis (please specify): _________   [ x ] Clinically undetermined       Please document in your progress notes daily for the duration of treatment, until resolved, and include in your discharge summary.     Form No. [id number]

## 2023-09-21 LAB — BACTERIA BLD CULT: NORMAL

## 2023-09-22 LAB — BACTERIA BLD CULT: NORMAL

## 2023-09-25 ENCOUNTER — PATIENT MESSAGE (OUTPATIENT)
Dept: INTERNAL MEDICINE | Facility: CLINIC | Age: 63
End: 2023-09-25
Payer: COMMERCIAL

## 2023-09-27 ENCOUNTER — HOSPITAL ENCOUNTER (EMERGENCY)
Facility: HOSPITAL | Age: 63
Discharge: HOME | End: 2023-09-27
Attending: EMERGENCY MEDICINE
Payer: COMMERCIAL

## 2023-09-27 VITALS
TEMPERATURE: 96 F | OXYGEN SATURATION: 97 % | DIASTOLIC BLOOD PRESSURE: 73 MMHG | HEART RATE: 80 BPM | SYSTOLIC BLOOD PRESSURE: 154 MMHG | RESPIRATION RATE: 18 BRPM

## 2023-09-27 DIAGNOSIS — F10.920 ACUTE ALCOHOLIC INTOXICATION WITHOUT COMPLICATION: Primary | ICD-10-CM

## 2023-09-27 PROCEDURE — 99283 EMERGENCY DEPT VISIT LOW MDM: CPT

## 2023-09-28 NOTE — ED PROVIDER NOTES
Encounter Date: 9/27/2023       History     Chief Complaint   Patient presents with    Alcohol Intoxication     Arrives via ems-called 911 and requested transport to hospital for body aches. She also reported excess alcohol consumption today after her  left her. Pt. Is uncooperative with labile mood and agitation. 1 bottle of vodka removed from patient by security on arrival. Accucheck-140 per ems.      The patient is a 63-year-old female who called 911 because she is intoxicated and has pain all over her body and is very upset because her  left her yesterday.  The patient states she has fibromyalgia and when she is under lot of stress it makes her fibromyalgia worse.  She states she is been drinking vodka this morning but has not had any vodka today.  EMS noted she had a bottle of vodka on her and refused to give it up.  The patient denies being suicidal or homicidal.  She has no history of suicidal ideations in the past.  She has no history of suicide attempts.      Review of patient's allergies indicates:   Allergen Reactions    Lortab [hydrocodone-acetaminophen] Itching    Promethazine Itching and Other (See Comments)     Past Medical History:   Diagnosis Date    Anemia     Anemia     Controlled type 2 diabetes mellitus without complication, without long-term current use of insulin 11/30/2021    COPD (chronic obstructive pulmonary disease)     Depression     Diverticulitis     Fatty liver     GERD (gastroesophageal reflux disease)     Hyperlipidemia     Hypertension     Pancreatitis     Peptic ulcer disease     Polysubstance abuse     Posterior reversible encephalopathy syndrome     Sarcoidosis of lung     Sarcoidosis of lung     over 30 yrs ago    Seizures     7/2017    Suicide attempt     Suicide ideation      Past Surgical History:   Procedure Laterality Date    APPENDECTOMY      BILATERAL MASTECTOMY Bilateral 10/29/2020    Procedure: MASTECTOMY, BILATERAL;  Surgeon: Baylee Kevin MD;   [address]  Service: General;  Laterality: Bilateral;    BREAST REVISION SURGERY Bilateral 2/11/2021    Procedure: BREAST REVISION SURGERY;  Surgeon: Scottei Johnson MD;  [facility];  Service: Plastics;  Laterality: Bilateral;    COLONOSCOPY N/A 7/28/2017    Procedure: COLONOSCOPY;  Surgeon: Aaron Alvarado MD;  Location: [facility] ENDO;  Service: Endoscopy;  Laterality: N/A;    ESOPHAGOGASTRODUODENOSCOPY  10/7/2016, 11/6/2014    2016 - gastritis, duodenitis, 2014 erosive gastritis    ESOPHAGOGASTRODUODENOSCOPY N/A 2/11/2020    Procedure: ESOPHAGOGASTRODUODENOSCOPY (EGD);  Surgeon: Fawn Garrido MD;  Location: [facility] ENDO;  Service: Endoscopy;  Laterality: N/A;    ESOPHAGOGASTRODUODENOSCOPY N/A 4/19/2021    Procedure: EGD (ESOPHAGOGASTRODUODENOSCOPY);  Surgeon: Paramjit Martino MD;  Location: [facility];  Service: Endoscopy;  Laterality: N/A;    FLEXIBLE SIGMOIDOSCOPY  11/06/2014    colitis    HYSTERECTOMY      IMPLANTATION OF PERMANENT SACRAL NERVE STIMULATOR N/A 7/12/2022    Procedure: INSERTION, NEUROSTIMULATOR, PERMANENT, SACRAL;  Surgeon: Juaquin Edwards MD;  Location: [address];  Service: Urology;  Laterality: N/A;  1hr    INJECTION FOR SENTINEL NODE IDENTIFICATION Right 10/29/2020    Procedure: INJECTION, FOR SENTINEL NODE IDENTIFICATION;  Surgeon: Baylee Kevin MD;  Location: [address];  Service: General;  Laterality: Right;    INJECTION OF JOINT Right 10/10/2019    Procedure: Injection, Joint RIGHT ILIOPSOAS BURSA/TENDON INJECTION AND RIGHT GLUTEAL TENDON INJECTION WITH STEROID AND LIDOCAINE;  Surgeon: Guillaume Rico MD;  Location: [facility] PAIN MGT;  Service: Pain Management;  Laterality: Right;  NEEDS CONSENT    INSERTION OF BREAST TISSUE EXPANDER Bilateral 10/29/2020    Procedure: INSERTION, TISSUE EXPANDER, BREAST;  Surgeon: Scottie Johnson MD;  Location: [facility] OR [address] [place] [address];  Service: Plastics;  Laterality: Bilateral;  Right breast: 1082 g  Left  breast: 1076 g    LIPOSUCTION Bilateral 2021    Procedure: LIPOSUCTION;  Surgeon: Scottie Johnson MD;  [facility];  Service: Plastics;  Laterality: Bilateral;    mediastenoscopy      REPLACEMENT OF IMPLANT OF BREAST Bilateral 2021    Procedure: REPLACEMENT, IMPLANT, BREAST;  Surgeon: Scottie Johnson MD;  [facility];  Service: Plastics;  Laterality: Bilateral;    SENTINEL LYMPH NODE BIOPSY Right 10/29/2020    Procedure: BIOPSY, LYMPH NODE, SENTINEL;  Surgeon: Baylee Kevin MD;  [facility];  Service: General;  Laterality: Right;    TONSILLECTOMY N/A 1970    TUBAL LIGATION       Family History   Problem Relation Age of Onset    Heart attack Father     Diabetes Father     Hypertension Father     Diabetes Mother     Hypertension Mother     Breast cancer Maternal Aunt     Colon cancer Maternal Uncle     Breast cancer Daughter     Ovarian cancer Neg Hx     Cancer Neg Hx      Social History     Tobacco Use    Smoking status: Every Day     Current packs/day: 0.00     Average packs/day: 0.5 packs/day for 30.0 years (15.0 ttl pk-yrs)     Types: Vaping with nicotine, Cigarettes     Start date: 1991     Last attempt to quit: 2021     Years since quittin.6    Smokeless tobacco: Never    Tobacco comments:     Patient is currently smoking 10 cigarettes a day, declines nicotine patches   Substance Use Topics    Alcohol use: Yes     Comment: vodka daily (half a regular bottle) for 4 days    Drug use: Yes     Types: Marijuana     Comment: gummies     Review of Systems   Constitutional:  Negative for fever.   HENT:  Negative for sore throat.    Respiratory:  Negative for shortness of breath.    Cardiovascular:  Negative for chest pain.   Gastrointestinal:  Negative for nausea.   Genitourinary:  Negative for dysuria.   Musculoskeletal:  Negative for back pain.   Skin:  Negative for rash.   Neurological:  Negative for weakness.   Hematological:  Does not  bruise/bleed easily.       Physical Exam     Initial Vitals [09/27/23 2004]   BP Pulse Resp Temp SpO2   125/76 80 16 -- 100 %      MAP       --         Physical Exam    Nursing note and vitals reviewed.  Constitutional: She appears well-developed and well-nourished.   HENT:   Head: Normocephalic and atraumatic.   Neck: Neck supple.   Normal range of motion.  Pulmonary/Chest: Breath sounds normal.   Abdominal: Abdomen is soft.   Musculoskeletal:      Cervical back: Normal range of motion and neck supple.     Neurological: She is alert and oriented to person, place, and time.   Skin: Skin is warm and dry.   Psychiatric: She has a normal mood and affect. Her behavior is normal. Judgment and thought content normal.         ED Course   Procedures  Labs Reviewed - No data to display       Imaging Results    None          Medications - No data to display  Medical Decision Making  The patient is a 63-year-old female with alcohol intoxication.  She called 911 because she is in pain and she wants something for pain.  The patient denies being suicidal or homicidal.  She has no history of suicide attempts in the past.  The case was discussed with the patient's .  He in fact did leave her last night and has gone to stay in a hotel.  He states she has been in rehab 8 or 9 times, [facility] and started drinking again last night and today.  He states he will come and get the patient and bring her home.    Amount and/or Complexity of Data Reviewed  Independent Historian: spouse    Risk  OTC drugs.  Prescription drug management.  Decision regarding hospitalization.                               Clinical Impression:   Final diagnoses:  [F10.920] Acute alcoholic intoxication without complication (Primary)        ED Disposition Condition    Discharge Stable          ED Prescriptions    None       Follow-up Information       Follow up With Specialties Details Why Contact Info    Michael,  Andrew HAAS MD Internal Medicine  As needed [address]  [address]  [facility] [address]  [phone]               Melissa Payne MD  09/27/23 2104

## 2023-09-28 NOTE — ED NOTES
Pt. Was discharged with , ambulatory with steady gait. 1 bottle of Vodka returned to patient upon discharge. Stable, without distress.

## 2023-09-28 NOTE — ED TRIAGE NOTES
Pt presents to ED via [facility] with complaints of all over body aches. Pt reports  left her yesterday and that's when the body aches began. Pt also reports she has been drinking vodka. Pt denies pain and other complaints at this time.     APPEARANCE: Alert, oriented and in no acute distress.  CARDIAC: Normal rate and rhythm.  PERIPHERAL VASCULAR: peripheral pulses present. Normal cap refill. No edema. Warm to touch.    RESPIRATORY:Normal rate and effort, breath sounds clear bilaterally throughout chest. Respirations are equal and unlabored no obvious signs of distress.  GASTRO: soft, bowel sounds normal, no tenderness, no abdominal distention.  MUSC: Full ROM. No bony tenderness or soft tissue tenderness. No obvious deformity.  SKIN: Skin is warm and dry, normal skin turgor, mucous membranes moist.  NEURO: 5/5 strength major flexors/extensors bilaterally. Sensory intact to light touch bilaterally. [place] coma scale: eyes open spontaneously-4, oriented & converses-5, obeys commands-6. No neurological abnormalities.   MENTAL STATUS: awake, alert and aware of environment.

## 2023-10-23 ENCOUNTER — PATIENT MESSAGE (OUTPATIENT)
Dept: INTERNAL MEDICINE | Facility: CLINIC | Age: 63
End: 2023-10-23
Payer: COMMERCIAL

## 2023-10-29 ENCOUNTER — PATIENT MESSAGE (OUTPATIENT)
Dept: HEMATOLOGY/ONCOLOGY | Facility: CLINIC | Age: 63
End: 2023-10-29
Payer: COMMERCIAL

## 2023-10-30 DIAGNOSIS — C91.10 CLL (CHRONIC LYMPHOCYTIC LEUKEMIA): Primary | ICD-10-CM

## 2023-10-31 ENCOUNTER — OFFICE VISIT (OUTPATIENT)
Dept: HEMATOLOGY/ONCOLOGY | Facility: CLINIC | Age: 63
End: 2023-10-31
Payer: COMMERCIAL

## 2023-10-31 ENCOUNTER — LAB VISIT (OUTPATIENT)
Dept: LAB | Facility: HOSPITAL | Age: 63
End: 2023-10-31
Payer: COMMERCIAL

## 2023-10-31 VITALS
OXYGEN SATURATION: 97 % | WEIGHT: 192.56 LBS | RESPIRATION RATE: 18 BRPM | SYSTOLIC BLOOD PRESSURE: 115 MMHG | HEART RATE: 103 BPM | HEIGHT: 66 IN | TEMPERATURE: 98 F | DIASTOLIC BLOOD PRESSURE: 56 MMHG | BODY MASS INDEX: 30.95 KG/M2

## 2023-10-31 DIAGNOSIS — Z85.3 HISTORY OF BREAST CANCER: ICD-10-CM

## 2023-10-31 DIAGNOSIS — C91.10 CLL (CHRONIC LYMPHOCYTIC LEUKEMIA): ICD-10-CM

## 2023-10-31 DIAGNOSIS — F10.151 ALCOHOL ABUSE WITH ALCOHOL-INDUCED PSYCHOTIC DISORDER WITH HALLUCINATIONS: Primary | ICD-10-CM

## 2023-10-31 LAB
ALBUMIN SERPL BCP-MCNC: 4.3 G/DL (ref 3.5–5.2)
ALP SERPL-CCNC: 62 U/L (ref 55–135)
ALT SERPL W/O P-5'-P-CCNC: 28 U/L (ref 10–44)
ANION GAP SERPL CALC-SCNC: 13 MMOL/L (ref 8–16)
ANISOCYTOSIS BLD QL SMEAR: SLIGHT
AST SERPL-CCNC: 27 U/L (ref 10–40)
BASOPHILS NFR BLD: 0 % (ref 0–1.9)
BILIRUB SERPL-MCNC: 0.3 MG/DL (ref 0.1–1)
BUN SERPL-MCNC: 16 MG/DL (ref 8–23)
CALCIUM SERPL-MCNC: 9.5 MG/DL (ref 8.7–10.5)
CHLORIDE SERPL-SCNC: 106 MMOL/L (ref 95–110)
CO2 SERPL-SCNC: 18 MMOL/L (ref 23–29)
CREAT SERPL-MCNC: 1.1 MG/DL (ref 0.5–1.4)
DIFFERENTIAL METHOD: ABNORMAL
EOSINOPHIL NFR BLD: 0 % (ref 0–8)
ERYTHROCYTE [DISTWIDTH] IN BLOOD BY AUTOMATED COUNT: 15.8 % (ref 11.5–14.5)
EST. GFR  (NO RACE VARIABLE): 55.8 ML/MIN/1.73 M^2
GLUCOSE SERPL-MCNC: 97 MG/DL (ref 70–110)
HCT VFR BLD AUTO: 34.5 % (ref 37–48.5)
HGB BLD-MCNC: 10.3 G/DL (ref 12–16)
IMM GRANULOCYTES # BLD AUTO: ABNORMAL K/UL (ref 0–0.04)
IMM GRANULOCYTES NFR BLD AUTO: ABNORMAL % (ref 0–0.5)
LDH SERPL L TO P-CCNC: 177 U/L (ref 110–260)
LYMPHOCYTES NFR BLD: 61 % (ref 18–48)
MCH RBC QN AUTO: 26.3 PG (ref 27–31)
MCHC RBC AUTO-ENTMCNC: 29.9 G/DL (ref 32–36)
MCV RBC AUTO: 88 FL (ref 82–98)
MONOCYTES NFR BLD: 1 % (ref 4–15)
NEUTROPHILS NFR BLD: 38 % (ref 38–73)
NRBC BLD-RTO: 0 /100 WBC
OVALOCYTES BLD QL SMEAR: ABNORMAL
PLATELET # BLD AUTO: 148 K/UL (ref 150–450)
PMV BLD AUTO: 10.4 FL (ref 9.2–12.9)
POIKILOCYTOSIS BLD QL SMEAR: SLIGHT
POLYCHROMASIA BLD QL SMEAR: ABNORMAL
POTASSIUM SERPL-SCNC: 4.7 MMOL/L (ref 3.5–5.1)
PROT SERPL-MCNC: 7.5 G/DL (ref 6–8.4)
RBC # BLD AUTO: 3.92 M/UL (ref 4–5.4)
SMUDGE CELLS BLD QL SMEAR: PRESENT
SODIUM SERPL-SCNC: 137 MMOL/L (ref 136–145)
WBC # BLD AUTO: 16.36 K/UL (ref 3.9–12.7)

## 2023-10-31 PROCEDURE — 3072F LOW RISK FOR RETINOPATHY: CPT | Mod: CPTII,S$GLB,, | Performed by: INTERNAL MEDICINE

## 2023-10-31 PROCEDURE — 3074F PR MOST RECENT SYSTOLIC BLOOD PRESSURE < 130 MM HG: ICD-10-PCS | Mod: CPTII,S$GLB,, | Performed by: INTERNAL MEDICINE

## 2023-10-31 PROCEDURE — 3044F HG A1C LEVEL LT 7.0%: CPT | Mod: CPTII,S$GLB,, | Performed by: INTERNAL MEDICINE

## 2023-10-31 PROCEDURE — 81263 IGH VARI REGIONAL MUTATION: CPT | Performed by: NURSE PRACTITIONER

## 2023-10-31 PROCEDURE — 99999 PR PBB SHADOW E&M-EST. PATIENT-LVL III: ICD-10-PCS | Mod: PBBFAC,,, | Performed by: INTERNAL MEDICINE

## 2023-10-31 PROCEDURE — 81352 TP53 GENE TRGT SEQUENCE ALYS: CPT | Performed by: NURSE PRACTITIONER

## 2023-10-31 PROCEDURE — 85027 COMPLETE CBC AUTOMATED: CPT | Performed by: NURSE PRACTITIONER

## 2023-10-31 PROCEDURE — 88184 FLOWCYTOMETRY/ TC 1 MARKER: CPT | Performed by: NURSE PRACTITIONER

## 2023-10-31 PROCEDURE — 3078F DIAST BP <80 MM HG: CPT | Mod: CPTII,S$GLB,, | Performed by: INTERNAL MEDICINE

## 2023-10-31 PROCEDURE — 99999 PR PBB SHADOW E&M-EST. PATIENT-LVL III: CPT | Mod: PBBFAC,,, | Performed by: INTERNAL MEDICINE

## 2023-10-31 PROCEDURE — 99214 OFFICE O/P EST MOD 30 MIN: CPT | Mod: S$GLB,,, | Performed by: INTERNAL MEDICINE

## 2023-10-31 PROCEDURE — 3078F PR MOST RECENT DIASTOLIC BLOOD PRESSURE < 80 MM HG: ICD-10-PCS | Mod: CPTII,S$GLB,, | Performed by: INTERNAL MEDICINE

## 2023-10-31 PROCEDURE — 83615 LACTATE (LD) (LDH) ENZYME: CPT | Performed by: NURSE PRACTITIONER

## 2023-10-31 PROCEDURE — 3044F PR MOST RECENT HEMOGLOBIN A1C LEVEL <7.0%: ICD-10-PCS | Mod: CPTII,S$GLB,, | Performed by: INTERNAL MEDICINE

## 2023-10-31 PROCEDURE — 80053 COMPREHEN METABOLIC PANEL: CPT | Performed by: NURSE PRACTITIONER

## 2023-10-31 PROCEDURE — 4010F ACE/ARB THERAPY RXD/TAKEN: CPT | Mod: CPTII,S$GLB,, | Performed by: INTERNAL MEDICINE

## 2023-10-31 PROCEDURE — 85007 BL SMEAR W/DIFF WBC COUNT: CPT | Performed by: NURSE PRACTITIONER

## 2023-10-31 PROCEDURE — 88185 FLOWCYTOMETRY/TC ADD-ON: CPT | Mod: 59 | Performed by: NURSE PRACTITIONER

## 2023-10-31 PROCEDURE — 3008F BODY MASS INDEX DOCD: CPT | Mod: CPTII,S$GLB,, | Performed by: INTERNAL MEDICINE

## 2023-10-31 PROCEDURE — 99214 PR OFFICE/OUTPT VISIT, EST, LEVL IV, 30-39 MIN: ICD-10-PCS | Mod: S$GLB,,, | Performed by: INTERNAL MEDICINE

## 2023-10-31 PROCEDURE — 4010F PR ACE/ARB THEARPY RXD/TAKEN: ICD-10-PCS | Mod: CPTII,S$GLB,, | Performed by: INTERNAL MEDICINE

## 2023-10-31 PROCEDURE — 3072F PR LOW RISK FOR RETINOPATHY: ICD-10-PCS | Mod: CPTII,S$GLB,, | Performed by: INTERNAL MEDICINE

## 2023-10-31 PROCEDURE — 3074F SYST BP LT 130 MM HG: CPT | Mod: CPTII,S$GLB,, | Performed by: INTERNAL MEDICINE

## 2023-10-31 PROCEDURE — 36415 COLL VENOUS BLD VENIPUNCTURE: CPT | Performed by: NURSE PRACTITIONER

## 2023-10-31 PROCEDURE — 3008F PR BODY MASS INDEX (BMI) DOCUMENTED: ICD-10-PCS | Mod: CPTII,S$GLB,, | Performed by: INTERNAL MEDICINE

## 2023-10-31 NOTE — PROGRESS NOTES
SECTION OF HEMATOLOGY AND BONE MARROW TRANSPLANT  Return Patient Visit   11/01/2023    CHIEF COMPLAINT: No chief complaint on file.        HISTORY OF PRESENT ILLNESS:   Self referred to me august 2022 for CLL/MBCL; was seen previously by dr. Gonzalez with recommendations for observation.    Of note cll fish done on pb and favorable 13 q del noted.  Not tp53 or IgHV sequencing sent.           Of note underwent bilateral mastectomy for  T1b N0 stage IA breast cancer October 2020 with no adjuvant therapy and was started on  Letrozole February 2021-may 2021.  Of note was referred to Dr. Gonzalez June 2022 for leukocytosis with flow cytometry of PB cw with cll vs other indolent b cell lymphoma phenotype.       Continues to struggle with etoh dependence, recently in rehab.          Was treated for oropharygneal infection NOS while in rehab; had sore throat and cervical adenopathy but this resolved.    Since last appt Denies fever, chills, nightsweats, bleeding, brusing, lymphadenopathy, signs/symptoms of splenomegaly, pruritis        PAST MEDICAL HISTORY:   Past Medical History:   Diagnosis Date    Anemia     Anemia     Controlled type 2 diabetes mellitus without complication, without long-term current use of insulin 11/30/2021    COPD (chronic obstructive pulmonary disease)     Depression     Diverticulitis     Fatty liver     GERD (gastroesophageal reflux disease)     Hyperlipidemia     Hypertension     Pancreatitis     Peptic ulcer disease     Polysubstance abuse     Posterior reversible encephalopathy syndrome     Sarcoidosis of lung     Sarcoidosis of lung     over 30 yrs ago    Seizures     7/2017    Suicide attempt     Suicide ideation        PAST SURGICAL HISTORY:   Past Surgical History:   Procedure Laterality Date    APPENDECTOMY      BILATERAL MASTECTOMY Bilateral 10/29/2020    Procedure: MASTECTOMY, BILATERAL;  Surgeon: Baylee Kevin MD;  Location: [facility] OR [address] [place] [address];  Service: General;  Laterality: Bilateral;     BREAST REVISION SURGERY Bilateral 2/11/2021    Procedure: BREAST REVISION SURGERY;  Surgeon: Scottie Johnson MD;  [facility];  Service: Plastics;  Laterality: Bilateral;    COLONOSCOPY N/A 7/28/2017    Procedure: COLONOSCOPY;  Surgeon: Aaron Alvarado MD;  Location: [facility] ENDO;  Service: Endoscopy;  Laterality: N/A;    ESOPHAGOGASTRODUODENOSCOPY  10/7/2016, 11/6/2014    2016 - gastritis, duodenitis, 2014 erosive gastritis    ESOPHAGOGASTRODUODENOSCOPY N/A 2/11/2020    Procedure: ESOPHAGOGASTRODUODENOSCOPY (EGD);  Surgeon: Fawn Garrido MD;  Location: [facility] ENDO;  Service: Endoscopy;  Laterality: N/A;    ESOPHAGOGASTRODUODENOSCOPY N/A 4/19/2021    Procedure: EGD (ESOPHAGOGASTRODUODENOSCOPY);  Surgeon: Paramjit Martino MD;  Location: [facility] ENDO;  Service: Endoscopy;  Laterality: N/A;    FLEXIBLE SIGMOIDOSCOPY  11/06/2014    colitis    HYSTERECTOMY      IMPLANTATION OF PERMANENT SACRAL NERVE STIMULATOR N/A 7/12/2022    Procedure: INSERTION, NEUROSTIMULATOR, PERMANENT, SACRAL;  Surgeon: Juaquin Edwards MD;  Location: [facility] OR [address] [place] [address];  Service: Urology;  Laterality: N/A;  1hr    INJECTION FOR SENTINEL NODE IDENTIFICATION Right 10/29/2020    Procedure: INJECTION, FOR SENTINEL NODE IDENTIFICATION;  Surgeon: Baylee Kevin MD;  [facility];  Service: General;  Laterality: Right;    INJECTION OF JOINT Right 10/10/2019    Procedure: Injection, Joint RIGHT ILIOPSOAS BURSA/TENDON INJECTION AND RIGHT GLUTEAL TENDON INJECTION WITH STEROID AND LIDOCAINE;  Surgeon: Guillaume Rico MD;  Location: [facility] PAIN MGT;  Service: Pain Management;  Laterality: Right;  NEEDS CONSENT    INSERTION OF BREAST TISSUE EXPANDER Bilateral 10/29/2020    Procedure: INSERTION, TISSUE EXPANDER, BREAST;  Surgeon: Scottie Johnson MD;  [facility];  Service: Plastics;  Laterality: Bilateral;  Right breast: 1082 g  Left breast: 1076 g    LIPOSUCTION Bilateral 2/11/2021    Procedure: LIPOSUCTION;   Surgeon: Scottie Johnson MD;  Location: [facility] OR [address] [place] [address];  Service: Plastics;  Laterality: Bilateral;    mediastenoscopy      REPLACEMENT OF IMPLANT OF BREAST Bilateral 2/11/2021    Procedure: REPLACEMENT, IMPLANT, BREAST;  Surgeon: Scottie Johnson MD;  [facility];  Service: Plastics;  Laterality: Bilateral;    SENTINEL LYMPH NODE BIOPSY Right 10/29/2020    Procedure: BIOPSY, LYMPH NODE, SENTINEL;  Surgeon: Baylee Kevin MD;  Location: [facility] OR [address] [place] [address];  Service: General;  Laterality: Right;    TONSILLECTOMY N/A 1970    TUBAL LIGATION         PAST SOCIAL HISTORY:   reports that she has been smoking vaping with nicotine and cigarettes. She started smoking about 32 years ago. She has a 15.0 pack-year smoking history. She has never used smokeless tobacco. She reports current alcohol use. She reports current drug use. Drug: Marijuana.    FAMILY HISTORY:  Family History   Problem Relation Age of Onset    Heart attack Father     Diabetes Father     Hypertension Father     Diabetes Mother     Hypertension Mother     Breast cancer Maternal Aunt     Colon cancer Maternal Uncle     Breast cancer Daughter     Ovarian cancer Neg Hx     Cancer Neg Hx        CURRENT MEDICATIONS:   Current Outpatient Medications   Medication Sig    acetaminophen (TYLENOL) 500 MG tablet Take 500-1,500 mg by mouth daily as needed for Pain.    albuterol (PROVENTIL/VENTOLIN HFA) 90 mcg/actuation inhaler     anastrozole (ARIMIDEX) 1 mg Tab     ARIPiprazole (ABILIFY) 10 MG Tab Take 1 tablet by mouth once daily.    ARIPiprazole (ABILIFY) 2 MG Tab Take 1 tablet by mouth once daily.    ARIPiprazole (ABILIFY) 5 MG Tab Take 1 tablet by mouth every morning.    atorvastatin (LIPITOR) 10 MG tablet Take 1 tablet by mouth once daily.    busPIRone (BUSPAR) 15 MG tablet     butalbital-acetaminophen-caffeine -40 mg (FIORICET, ESGIC) -40 mg per tablet Take 1 tablet by mouth every 4 (four) hours as needed.    cholecalciferol,  vitamin D3, (VITAMIN D3) 50 mcg (2,000 unit) Cap capsule TAKE 1 CAPSULE BY MOUTH ONCE A DAY IN THE MORNING    cloNIDine (CATAPRES) 0.1 MG tablet Take by mouth.    diazePAM (VALIUM) 5 MG tablet Take 2 tablets (10 mg total) by mouth every 8 (eight) hours for 3 days, THEN 2 tablets (10 mg total) every 12 (twelve) hours for 3 days, THEN 1 tablet (5 mg total) every 12 (twelve) hours for 3 days, THEN 1 tablet (5 mg total) once daily for 3 days, THEN 0.5 tablets (2.5 mg total) once daily for 3 days.    dicyclomine (BENTYL) 20 mg tablet Take 20 mg by mouth 4 (four) times daily.    doxepin (SINEQUAN) 150 MG Cap Take 150 mg by mouth every evening.    DULoxetine (CYMBALTA) 60 MG capsule Take 60 mg by mouth.    EScitalopram oxalate (LEXAPRO) 10 MG tablet Take 10 mg by mouth once daily.    FLUoxetine 20 MG capsule Take 3 capsules by mouth once daily.    fluticasone furoate-vilanteroL (BREO ELLIPTA) 100-25 mcg/dose diskus inhaler Inhale 1 puff into the lungs once daily. Controller    folic acid (FOLVITE) 1 MG tablet Take 1 tablet (1 mg total) by mouth once daily.    gabapentin (NEURONTIN) 600 MG tablet Take 1 tablet (600 mg total) by mouth 3 (three) times daily. for 10 days    hydroCHLOROthiazide (HYDRODIURIL) 25 MG tablet     hydrOXYzine pamoate (VISTARIL) 50 MG Cap TAKE ONE CAPSULE BY MOUTH EVERY 6 HOURS AS NEEDED FOR ANXIETY AND/OR EVERY NIGHT AT BEDTIME AS NEEDED FOR INSOMNIA    ipratropium (ATROVENT HFA) 17 mcg/actuation inhaler Inhale 2 puffs into the lungs every 6 (six) hours as needed.    letrozole (FEMARA) 2.5 mg Tab     levothyroxine (SYNTHROID) 50 MCG tablet TAKE 1 TABLET(50 MCG) BY MOUTH BEFORE BREAKFAST    lisinopriL (PRINIVIL,ZESTRIL) 20 MG tablet Take 1 tablet (20 mg total) by mouth once daily.    loperamide (IMODIUM) 2 mg capsule Take 2 mg by mouth 4 (four) times daily as needed for Diarrhea (Per package directions).    losartan (COZAAR) 25 MG tablet Take 1 tablet by mouth once daily.    melatonin (MELATIN) Take  by mouth nightly as needed for Insomnia.    metFORMIN (GLUCOPHAGE-XR) 500 MG ER 24hr tablet Take 2 tablets (1,000 mg total) by mouth 2 (two) times daily with meals.    metoprolol succinate (TOPROL-XL) 50 MG 24 hr tablet     mirtazapine (REMERON) 30 MG tablet     multivitamin Tab Take 1 tablet by mouth once daily.    nabumetone (RELAFEN) 500 MG tablet Take 500 mg by mouth 2 (two) times daily.    ondansetron (ZOFRAN-ODT) 4 MG TbDL Take 1 tablet (4 mg total) by mouth every 6 (six) hours as needed (nausea/vomiting).    pantoprazole (PROTONIX) 40 MG tablet Take 40 mg by mouth once daily.    propranoloL (INDERAL) 20 MG tablet Take 20 mg by mouth 2 (two) times daily.    thiamine 100 MG tablet Take 1 tablet (100 mg total) by mouth once daily.    traZODone (DESYREL) 300 MG tablet Take 1 tablet by mouth every evening.     No current facility-administered medications for this visit.     Facility-Administered Medications Ordered in Other Visits   Medication    albuterol sulfate nebulizer solution 2.5 mg     ALLERGIES:   Review of patient's allergies indicates:   Allergen Reactions    Lortab [hydrocodone-acetaminophen] Itching    Promethazine Itching and Other (See Comments)           REVIEW OF SYSTEMS:    See hpi    PHYSICAL EXAM:      Vitals:    10/31/23 0918   BP: (!) 115/56   Pulse: 103   Resp: 18   Temp: 98.4 °F (36.9 °C)          General - well developed, well nourished, no apparent distress  HEENT - oropharynx clear  Chest and Lung - clear to auscultation bilaterally   Cardiovascular - RRR with no MGR, normal S1 and S2  Abdomen-  soft, nontender, no palpable hepatomegaly or splenomegaly  Lymph - no palpable lymphadenopathy  Heme - no bruising, petechiae, pallor  Skin - no rashes or lesions  Psych - appropriate mood and affect      ECOG Performance Status: (foot note - ECOG PS provided by [employer]) 1 - Symptomatic but completely ambulatory    Karnofsky Performance Score:  90%- Able to Carry on  Normal Activity: Minor Symptoms of Disease  DATA:   Lab Results   Component Value Date    WBC 16.36 (H) 10/31/2023    HGB 10.3 (L) 10/31/2023    HCT 34.5 (L) 10/31/2023    MCV 88 10/31/2023     (L) 10/31/2023       Gran # (ANC)   Date Value Ref Range Status   07/21/2023 2.0 1.8 - 7.7 K/uL Final     Gran %   Date Value Ref Range Status   10/31/2023 38.0 38.0 - 73.0 % Final     Lymph #   Date Value Ref Range Status   09/17/2023 CANCELED 1.0 - 4.8 K/uL      Comment:     Result canceled by the ancillary.     Lymph %   Date Value Ref Range Status   10/31/2023 61.0 (H) 18.0 - 48.0 % Final     CMP  Sodium   Date Value Ref Range Status   10/31/2023 137 136 - 145 mmol/L Final     Potassium   Date Value Ref Range Status   10/31/2023 4.7 3.5 - 5.1 mmol/L Final     Chloride   Date Value Ref Range Status   10/31/2023 106 95 - 110 mmol/L Final     CO2   Date Value Ref Range Status   10/31/2023 18 (L) 23 - 29 mmol/L Final     Glucose   Date Value Ref Range Status   10/31/2023 97 70 - 110 mg/dL Final     BUN   Date Value Ref Range Status   10/31/2023 16 8 - 23 mg/dL Final     Creatinine   Date Value Ref Range Status   10/31/2023 1.1 0.5 - 1.4 mg/dL Final     Calcium   Date Value Ref Range Status   10/31/2023 9.5 8.7 - 10.5 mg/dL Final     Total Protein   Date Value Ref Range Status   10/31/2023 7.5 6.0 - 8.4 g/dL Final     Albumin   Date Value Ref Range Status   10/31/2023 4.3 3.5 - 5.2 g/dL Final     Total Bilirubin   Date Value Ref Range Status   10/31/2023 0.3 0.1 - 1.0 mg/dL Final     Comment:     For infants and newborns, interpretation of results should be based  on gestational age, weight and in agreement with clinical  observations.    Premature Infant recommended reference ranges:  Up to 24 hours.............<8.0 mg/dL  Up to 48 hours............<12.0 mg/dL  3-5 days..................<15.0 mg/dL  6-29 days.................<15.0 mg/dL       Alkaline Phosphatase   Date Value Ref Range Status   10/31/2023 35 38 - 154  U/L Final     AST   Date Value Ref Range Status   10/31/2023 27 10 - 40 U/L Final     ALT   Date Value Ref Range Status   10/31/2023 28 10 - 44 U/L Final     Anion Gap   Date Value Ref Range Status   10/31/2023 13 8 - 16 mmol/L Final     eGFR if    Date Value Ref Range Status   06/22/2022 >60.0 >60 mL/min/1.73 m^2 Final     eGFR if non    Date Value Ref Range Status   06/22/2022 >60.0 >60 mL/min/1.73 m^2 Final     Comment:     Calculation used to obtain the estimated glomerular filtration  rate (eGFR) is the CKD-EPI equation.        CT ap with contrast - may 2022  COMPARISON:  04/17/2021     FINDINGS:  Left basilar discoid atelectasis/scarring noted.     Liver demonstrates diffuse hypoattenuation in keeping with steatosis and appears enlarged, measuring 19 cm.  No focal suspicious lesions.  No calcified gallstones.  No intrahepatic or extrahepatic biliary dilatation.  Pancreas is unremarkable.  Spleen is enlarged, measuring 16.4 x 5.6 cm.  2.0 cm hypoattenuating lesion at the inferior aspect is stable.  Adrenal glands are unremarkable.  No hydronephrosis. There is asymmetric right perinephric stranding with retroperitoneal/periureteral stranding extending to the pelvis.  Mild right ureteral wall thickening noted.  There is multifocal abnormal enhancement within the right renal parenchyma in keeping with pyelonephritis.  No evidence for urolithiasis.     GI tract demonstrates no evidence for obstruction or inflammation.  Multiple colonic diverticula are noted.     Prominent lymph nodes are noted at the nolvia hepatis, largest measuring 1.5 cm short axis.     No ascites.     Abdominal aorta is normal caliber, noting calcified plaque.     Status post hysterectomy.  No adnexal masses.  Urinary bladder is unremarkable.     Regional osseous structures demonstrate no aggressive appearing lytic or blastic lesions.  There are postoperative changes of in the lower lumbar spine.  Note made of  partially visualized breast implants.     Impression:     1. Right perinephric/periureteral stranding with mild ureteral wall thickening.  Abnormal enhancement of the right kidney in keeping with pyelonephritis.  2. Hepatomegaly with steatosis.  3. Splenomegaly.  4. Stable periportal lymphadenopathy, likely reactive.      6/22/22 - PB flow cytometry  Immunophenotyping of peripheral blood detects a distinct kappa light chain   restricted monoclonal B-cell population  (calculated at 2.44x10   9 /L, from the most recent CBC showing a total WBC of  7.35 K/uL with 61%   total lymphocytes)  with a CLL phenotype (coexpression of CD19, CD5, CD23 and   dim CD20). CD22 (FITC), FMC-7 and CD38 are negative in this population.   Without associated lymphadenopathy, organomegaly, other extramedullary   involvement, or any other features of a B-cell lymphoproliferative disorder,   a monoclonal B-cell count   <5x10 9 /L in the peripheral blood should be classified as monoclonal B-cell   lymphocytosis (MBL) (WHO Classification of Tumors of Hematopoietic and   Lymphoid Tissues, 2017). Correlation with clinical presentation and other   laboratory results is required.     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     In CLL, a 13q deletion is associated with a favorable   prognosis (Green et al., Blood 127:5741-4983, 2016; Van   Glenke et al., Brit J Haematol 173:105-113, 2016).     ASSESSMENT AND PLAN:   Encounter Diagnoses   Name Primary?    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 Yes    CLL (chronic lymphocytic leukemia)     History of breast cancer        -MBCL/CLL; did meet CLL criteria on one cbc June 2022 but has since returned to MBCL criteria  -confirmed on June 2022 PB flow cytometry  -continues to be asymptomatic from CLL hematologic perspective with normal physical exam   -no indication for imaging or  marrow biopsy currently   -favorable risk 13 q del by fish; for furhter prognostication have today sent  tp53 sequencing and ighv mutation which is pending   -she has chronic mild anemia/thrombocytopenia that is likely due to her etoh related liver disease for which she follows with hepatology;   normal nutritional evaluation jan 2022; do not feel cytopenias are related to her Lymphoproliferative disorder   -will follow pt q 6 months with labs and provider appts  -educated on symptoms for which to seek attn in our clinic earlier   -fu with med onc for breast cancer recurrence surveillance    Fu: cbc, cmp, ldh, and MD appt in 6 months        Tristin Montano MD  Hematology/Oncology/Bone Marrow Transplant

## 2023-11-01 LAB
PATH REPORT.FINAL DX SPEC: NORMAL
TP53 PRE-ANALYSIS CELL SORT: NORMAL

## 2023-11-04 ENCOUNTER — HOSPITAL ENCOUNTER (INPATIENT)
Facility: HOSPITAL | Age: 63
LOS: 1 days | Discharge: HOME | DRG: 897 | End: 2023-11-06
Attending: EMERGENCY MEDICINE | Admitting: STUDENT IN AN ORGANIZED HEALTH CARE EDUCATION/TRAINING PROGRAM
Payer: COMMERCIAL

## 2023-11-04 DIAGNOSIS — F19.929 INTOXICATION BY DRUG: ICD-10-CM

## 2023-11-04 DIAGNOSIS — R07.9 CHEST PAIN: ICD-10-CM

## 2023-11-04 DIAGNOSIS — R45.1 AGITATION: ICD-10-CM

## 2023-11-04 LAB
ALBUMIN SERPL BCP-MCNC: 4.2 G/DL (ref 3.5–5.2)
ALBUMIN SERPL BCP-MCNC: 4.3 G/DL (ref 3.5–5.2)
ALLENS TEST: ABNORMAL
ALP SERPL-CCNC: 57 U/L (ref 55–135)
ALP SERPL-CCNC: 63 U/L (ref 55–135)
ALT SERPL W/O P-5'-P-CCNC: 23 U/L (ref 10–44)
ALT SERPL W/O P-5'-P-CCNC: 23 U/L (ref 10–44)
AMPHET+METHAMPHET UR QL: NEGATIVE
ANION GAP SERPL CALC-SCNC: 14 MMOL/L (ref 8–16)
ANION GAP SERPL CALC-SCNC: 21 MMOL/L (ref 8–16)
ANISOCYTOSIS BLD QL SMEAR: SLIGHT
APAP SERPL-MCNC: <3 UG/ML (ref 10–20)
AST SERPL-CCNC: 34 U/L (ref 10–40)
AST SERPL-CCNC: 34 U/L (ref 10–40)
BARBITURATES UR QL SCN>200 NG/ML: NEGATIVE
BASOPHILS NFR BLD: 0 % (ref 0–1.9)
BENZODIAZ UR QL SCN>200 NG/ML: NEGATIVE
BILIRUB SERPL-MCNC: 0.3 MG/DL (ref 0.1–1)
BILIRUB SERPL-MCNC: 0.4 MG/DL (ref 0.1–1)
BILIRUB UR QL STRIP: NEGATIVE
BUN SERPL-MCNC: 24 MG/DL (ref 8–23)
BUN SERPL-MCNC: 31 MG/DL (ref 8–23)
BUN SERPL-MCNC: 32 MG/DL (ref 6–30)
BZE UR QL SCN: NEGATIVE
CALCIUM SERPL-MCNC: 8.8 MG/DL (ref 8.7–10.5)
CALCIUM SERPL-MCNC: 8.9 MG/DL (ref 8.7–10.5)
CANNABINOIDS UR QL SCN: NEGATIVE
CHLORIDE SERPL-SCNC: 103 MMOL/L (ref 95–110)
CHLORIDE SERPL-SCNC: 104 MMOL/L (ref 95–110)
CHLORIDE SERPL-SCNC: 108 MMOL/L (ref 95–110)
CK SERPL-CCNC: 90 U/L (ref 20–180)
CLARITY UR REFRACT.AUTO: CLEAR
CO2 SERPL-SCNC: 16 MMOL/L (ref 23–29)
CO2 SERPL-SCNC: 22 MMOL/L (ref 23–29)
COLOR UR AUTO: COLORLESS
CREAT SERPL-MCNC: 0.9 MG/DL (ref 0.5–1.4)
CREAT SERPL-MCNC: 0.9 MG/DL (ref 0.5–1.4)
CREAT SERPL-MCNC: 1.1 MG/DL (ref 0.5–1.4)
CREAT UR-MCNC: 52 MG/DL (ref 15–325)
DELSYS: ABNORMAL
DIFFERENTIAL METHOD: ABNORMAL
EOSINOPHIL NFR BLD: 0 % (ref 0–8)
ERYTHROCYTE [DISTWIDTH] IN BLOOD BY AUTOMATED COUNT: 17.2 % (ref 11.5–14.5)
EST. GFR  (NO RACE VARIABLE): >60 ML/MIN/1.73 M^2
EST. GFR  (NO RACE VARIABLE): >60 ML/MIN/1.73 M^2
ESTIMATED AVG GLUCOSE: 111 MG/DL (ref 68–131)
ETHANOL SERPL-MCNC: 211 MG/DL
FLOW: 2
GLUCOSE SERPL-MCNC: 64 MG/DL (ref 70–110)
GLUCOSE SERPL-MCNC: 66 MG/DL (ref 70–110)
GLUCOSE SERPL-MCNC: 73 MG/DL (ref 70–110)
GLUCOSE UR QL STRIP: NEGATIVE
HBA1C MFR BLD: 5.5 % (ref 4–5.6)
HCO3 UR-SCNC: 22.9 MMOL/L (ref 24–28)
HCT VFR BLD AUTO: 39.4 % (ref 37–48.5)
HCT VFR BLD CALC: 35 %PCV (ref 36–54)
HCT VFR BLD CALC: 38 %PCV (ref 36–54)
HGB BLD-MCNC: 11.6 G/DL (ref 12–16)
HGB UR QL STRIP: NEGATIVE
IMM GRANULOCYTES # BLD AUTO: ABNORMAL K/UL (ref 0–0.04)
IMM GRANULOCYTES NFR BLD AUTO: ABNORMAL % (ref 0–0.5)
IRON SERPL-MCNC: 269 UG/DL (ref 30–160)
KETONES UR QL STRIP: ABNORMAL
LACTATE SERPL-SCNC: 0.7 MMOL/L (ref 0.5–2.2)
LACTATE SERPL-SCNC: 3.2 MMOL/L (ref 0.5–2.2)
LEUKOCYTE ESTERASE UR QL STRIP: ABNORMAL
LYMPHOCYTES NFR BLD: 86 % (ref 18–48)
MAGNESIUM SERPL-MCNC: 1.5 MG/DL (ref 1.6–2.6)
MCH RBC QN AUTO: 26 PG (ref 27–31)
MCHC RBC AUTO-ENTMCNC: 29.4 G/DL (ref 32–36)
MCV RBC AUTO: 88 FL (ref 82–98)
METHADONE UR QL SCN>300 NG/ML: NEGATIVE
MICROSCOPIC COMMENT: NORMAL
MODE: ABNORMAL
MONOCYTES NFR BLD: 2 % (ref 4–15)
NEUTROPHILS NFR BLD: 12 % (ref 38–73)
NITRITE UR QL STRIP: NEGATIVE
NRBC BLD-RTO: 0 /100 WBC
OPIATES UR QL SCN: NEGATIVE
OVALOCYTES BLD QL SMEAR: ABNORMAL
PCO2 BLDA: 41.9 MMHG (ref 35–45)
PCP UR QL SCN>25 NG/ML: NEGATIVE
PH SMN: 7.34 [PH] (ref 7.35–7.45)
PH UR STRIP: 5 [PH] (ref 5–8)
PLATELET # BLD AUTO: 173 K/UL (ref 150–450)
PMV BLD AUTO: 10.6 FL (ref 9.2–12.9)
PO2 BLDA: 35 MMHG (ref 40–60)
POC BE: -3 MMOL/L
POC IONIZED CALCIUM: 0.99 MMOL/L (ref 1.06–1.42)
POC SATURATED O2: 64 % (ref 95–100)
POC TCO2 (MEASURED): 21 MMOL/L (ref 23–27)
POC TCO2: 24 MMOL/L (ref 24–29)
POIKILOCYTOSIS BLD QL SMEAR: SLIGHT
POLYCHROMASIA BLD QL SMEAR: ABNORMAL
POTASSIUM BLD-SCNC: 5.3 MMOL/L (ref 3.5–5.1)
POTASSIUM SERPL-SCNC: 4.5 MMOL/L (ref 3.5–5.1)
POTASSIUM SERPL-SCNC: 5.5 MMOL/L (ref 3.5–5.1)
PROCALCITONIN SERPL IA-MCNC: 0.03 NG/ML
PROT SERPL-MCNC: 7 G/DL (ref 6–8.4)
PROT SERPL-MCNC: 7.4 G/DL (ref 6–8.4)
PROT UR QL STRIP: NEGATIVE
RBC # BLD AUTO: 4.46 M/UL (ref 4–5.4)
RBC #/AREA URNS AUTO: 1 /HPF (ref 0–4)
SAMPLE: ABNORMAL
SAMPLE: ABNORMAL
SATURATED IRON: 51 % (ref 20–50)
SITE: ABNORMAL
SMUDGE CELLS BLD QL SMEAR: PRESENT
SODIUM BLD-SCNC: 139 MMOL/L (ref 136–145)
SODIUM SERPL-SCNC: 139 MMOL/L (ref 136–145)
SODIUM SERPL-SCNC: 141 MMOL/L (ref 136–145)
SP GR UR STRIP: 1.01 (ref 1–1.03)
T4 FREE SERPL-MCNC: 1.17 NG/DL (ref 0.71–1.51)
TOTAL IRON BINDING CAPACITY: 524 UG/DL (ref 250–450)
TOXICOLOGY INFORMATION: NORMAL
TRANSFERRIN SERPL-MCNC: 354 MG/DL (ref 200–375)
TSH SERPL DL<=0.005 MIU/L-ACNC: 0.17 UIU/ML (ref 0.4–4)
URN SPEC COLLECT METH UR: ABNORMAL
WBC # BLD AUTO: 33.05 K/UL (ref 3.9–12.7)
WBC #/AREA URNS AUTO: 3 /HPF (ref 0–5)

## 2023-11-04 PROCEDURE — 80053 COMPREHEN METABOLIC PANEL: CPT | Mod: 91 | Performed by: STUDENT IN AN ORGANIZED HEALTH CARE EDUCATION/TRAINING PROGRAM

## 2023-11-04 PROCEDURE — 85027 COMPLETE CBC AUTOMATED: CPT | Performed by: EMERGENCY MEDICINE

## 2023-11-04 PROCEDURE — 99285 EMERGENCY DEPT VISIT HI MDM: CPT | Mod: 25

## 2023-11-04 PROCEDURE — 25000003 PHARM REV CODE 250: Performed by: EMERGENCY MEDICINE

## 2023-11-04 PROCEDURE — 25000003 PHARM REV CODE 250: Performed by: STUDENT IN AN ORGANIZED HEALTH CARE EDUCATION/TRAINING PROGRAM

## 2023-11-04 PROCEDURE — G0378 HOSPITAL OBSERVATION PER HR: HCPCS

## 2023-11-04 PROCEDURE — 80143 DRUG ASSAY ACETAMINOPHEN: CPT | Performed by: EMERGENCY MEDICINE

## 2023-11-04 PROCEDURE — 96365 THER/PROPH/DIAG IV INF INIT: CPT

## 2023-11-04 PROCEDURE — 96372 THER/PROPH/DIAG INJ SC/IM: CPT | Performed by: STUDENT IN AN ORGANIZED HEALTH CARE EDUCATION/TRAINING PROGRAM

## 2023-11-04 PROCEDURE — 96367 TX/PROPH/DG ADDL SEQ IV INF: CPT

## 2023-11-04 PROCEDURE — 83036 HEMOGLOBIN GLYCOSYLATED A1C: CPT | Performed by: PHYSICIAN ASSISTANT

## 2023-11-04 PROCEDURE — 63600175 PHARM REV CODE 636 W HCPCS

## 2023-11-04 PROCEDURE — 96376 TX/PRO/DX INJ SAME DRUG ADON: CPT

## 2023-11-04 PROCEDURE — 87040 BLOOD CULTURE FOR BACTERIA: CPT | Mod: 59 | Performed by: EMERGENCY MEDICINE

## 2023-11-04 PROCEDURE — 93010 ELECTROCARDIOGRAM REPORT: CPT | Mod: ,,, | Performed by: INTERNAL MEDICINE

## 2023-11-04 PROCEDURE — 82803 BLOOD GASES ANY COMBINATION: CPT

## 2023-11-04 PROCEDURE — 99291 PR CRITICAL CARE, E/M 30-74 MINUTES: ICD-10-PCS | Mod: ,,, | Performed by: PHYSICIAN ASSISTANT

## 2023-11-04 PROCEDURE — 93010 ELECTROCARDIOGRAM REPORT: CPT | Mod: 76,,, | Performed by: INTERNAL MEDICINE

## 2023-11-04 PROCEDURE — 82077 ASSAY SPEC XCP UR&BREATH IA: CPT | Performed by: EMERGENCY MEDICINE

## 2023-11-04 PROCEDURE — 83605 ASSAY OF LACTIC ACID: CPT | Performed by: STUDENT IN AN ORGANIZED HEALTH CARE EDUCATION/TRAINING PROGRAM

## 2023-11-04 PROCEDURE — 96368 THER/DIAG CONCURRENT INF: CPT

## 2023-11-04 PROCEDURE — 93010 EKG 12-LEAD: ICD-10-PCS | Mod: ,,, | Performed by: INTERNAL MEDICINE

## 2023-11-04 PROCEDURE — 27000221 HC OXYGEN, UP TO 24 HOURS

## 2023-11-04 PROCEDURE — 25000242 PHARM REV CODE 250 ALT 637 W/ HCPCS: Performed by: STUDENT IN AN ORGANIZED HEALTH CARE EDUCATION/TRAINING PROGRAM

## 2023-11-04 PROCEDURE — 81001 URINALYSIS AUTO W/SCOPE: CPT | Performed by: EMERGENCY MEDICINE

## 2023-11-04 PROCEDURE — 96375 TX/PRO/DX INJ NEW DRUG ADDON: CPT

## 2023-11-04 PROCEDURE — 99291 CRITICAL CARE FIRST HOUR: CPT | Mod: ,,, | Performed by: PHYSICIAN ASSISTANT

## 2023-11-04 PROCEDURE — 85007 BL SMEAR W/DIFF WBC COUNT: CPT | Performed by: EMERGENCY MEDICINE

## 2023-11-04 PROCEDURE — 93005 ELECTROCARDIOGRAM TRACING: CPT

## 2023-11-04 PROCEDURE — 94640 AIRWAY INHALATION TREATMENT: CPT | Mod: XB

## 2023-11-04 PROCEDURE — 94640 AIRWAY INHALATION TREATMENT: CPT

## 2023-11-04 PROCEDURE — 63600175 PHARM REV CODE 636 W HCPCS: Performed by: PHYSICIAN ASSISTANT

## 2023-11-04 PROCEDURE — 99900035 HC TECH TIME PER 15 MIN (STAT)

## 2023-11-04 PROCEDURE — 80307 DRUG TEST PRSMV CHEM ANLYZR: CPT | Performed by: EMERGENCY MEDICINE

## 2023-11-04 PROCEDURE — 82550 ASSAY OF CK (CPK): CPT | Performed by: EMERGENCY MEDICINE

## 2023-11-04 PROCEDURE — 83540 ASSAY OF IRON: CPT | Performed by: EMERGENCY MEDICINE

## 2023-11-04 PROCEDURE — 94761 N-INVAS EAR/PLS OXIMETRY MLT: CPT

## 2023-11-04 PROCEDURE — 84145 PROCALCITONIN (PCT): CPT | Performed by: STUDENT IN AN ORGANIZED HEALTH CARE EDUCATION/TRAINING PROGRAM

## 2023-11-04 PROCEDURE — 81003 URINALYSIS AUTO W/O SCOPE: CPT | Mod: 59 | Performed by: EMERGENCY MEDICINE

## 2023-11-04 PROCEDURE — 96366 THER/PROPH/DIAG IV INF ADDON: CPT

## 2023-11-04 PROCEDURE — 63600175 PHARM REV CODE 636 W HCPCS: Performed by: EMERGENCY MEDICINE

## 2023-11-04 PROCEDURE — 84443 ASSAY THYROID STIM HORMONE: CPT | Performed by: EMERGENCY MEDICINE

## 2023-11-04 PROCEDURE — 80053 COMPREHEN METABOLIC PANEL: CPT | Performed by: EMERGENCY MEDICINE

## 2023-11-04 PROCEDURE — 25000242 PHARM REV CODE 250 ALT 637 W/ HCPCS: Performed by: EMERGENCY MEDICINE

## 2023-11-04 PROCEDURE — 63600175 PHARM REV CODE 636 W HCPCS: Performed by: STUDENT IN AN ORGANIZED HEALTH CARE EDUCATION/TRAINING PROGRAM

## 2023-11-04 PROCEDURE — 84439 ASSAY OF FREE THYROXINE: CPT | Performed by: EMERGENCY MEDICINE

## 2023-11-04 PROCEDURE — 36415 COLL VENOUS BLD VENIPUNCTURE: CPT | Performed by: STUDENT IN AN ORGANIZED HEALTH CARE EDUCATION/TRAINING PROGRAM

## 2023-11-04 PROCEDURE — 83605 ASSAY OF LACTIC ACID: CPT | Mod: 91 | Performed by: PHYSICIAN ASSISTANT

## 2023-11-04 PROCEDURE — 84466 ASSAY OF TRANSFERRIN: CPT | Performed by: EMERGENCY MEDICINE

## 2023-11-04 PROCEDURE — 83735 ASSAY OF MAGNESIUM: CPT | Performed by: STUDENT IN AN ORGANIZED HEALTH CARE EDUCATION/TRAINING PROGRAM

## 2023-11-04 RX ORDER — DIPHENHYDRAMINE HYDROCHLORIDE 50 MG/ML
50 INJECTION INTRAMUSCULAR; INTRAVENOUS ONCE
Status: COMPLETED | OUTPATIENT
Start: 2023-11-04 | End: 2023-11-04

## 2023-11-04 RX ORDER — DIAZEPAM 10 MG/2ML
10 INJECTION INTRAMUSCULAR EVERY 8 HOURS
Status: DISCONTINUED | OUTPATIENT
Start: 2023-11-05 | End: 2023-11-06 | Stop reason: HOSPADM

## 2023-11-04 RX ORDER — HALOPERIDOL 5 MG/ML
5 INJECTION INTRAMUSCULAR
Status: DISCONTINUED | OUTPATIENT
Start: 2023-11-04 | End: 2023-11-04

## 2023-11-04 RX ORDER — SODIUM CHLORIDE 0.9 % (FLUSH) 0.9 %
10 SYRINGE (ML) INJECTION EVERY 12 HOURS PRN
Status: DISCONTINUED | OUTPATIENT
Start: 2023-11-04 | End: 2023-11-06 | Stop reason: HOSPADM

## 2023-11-04 RX ORDER — CHLORDIAZEPOXIDE HYDROCHLORIDE 25 MG/1
100 CAPSULE, GELATIN COATED ORAL ONCE
Status: COMPLETED | OUTPATIENT
Start: 2023-11-04 | End: 2023-11-04

## 2023-11-04 RX ORDER — LORAZEPAM 2 MG/ML
2 INJECTION INTRAMUSCULAR
Status: DISCONTINUED | OUTPATIENT
Start: 2023-11-04 | End: 2023-11-04

## 2023-11-04 RX ORDER — HALOPERIDOL 5 MG/ML
2.5 INJECTION INTRAMUSCULAR
Status: COMPLETED | OUTPATIENT
Start: 2023-11-04 | End: 2023-11-04

## 2023-11-04 RX ORDER — ZIPRASIDONE MESYLATE 20 MG/ML
20 INJECTION, POWDER, LYOPHILIZED, FOR SOLUTION INTRAMUSCULAR ONCE
Status: COMPLETED | OUTPATIENT
Start: 2023-11-04 | End: 2023-11-04

## 2023-11-04 RX ORDER — LORAZEPAM 2 MG/ML
1 INJECTION INTRAMUSCULAR
Status: DISCONTINUED | OUTPATIENT
Start: 2023-11-04 | End: 2023-11-04

## 2023-11-04 RX ORDER — PANTOPRAZOLE SODIUM 40 MG/1
40 TABLET, DELAYED RELEASE ORAL DAILY
Status: DISCONTINUED | OUTPATIENT
Start: 2023-11-04 | End: 2023-11-06 | Stop reason: HOSPADM

## 2023-11-04 RX ORDER — CHLORDIAZEPOXIDE HYDROCHLORIDE 25 MG/1
50 CAPSULE, GELATIN COATED ORAL
Status: DISCONTINUED | OUTPATIENT
Start: 2023-11-05 | End: 2023-11-04

## 2023-11-04 RX ORDER — METOCLOPRAMIDE HYDROCHLORIDE 5 MG/ML
10 INJECTION INTRAMUSCULAR; INTRAVENOUS
Status: COMPLETED | OUTPATIENT
Start: 2023-11-04 | End: 2023-11-04

## 2023-11-04 RX ORDER — IBUPROFEN 200 MG
16 TABLET ORAL
Status: DISCONTINUED | OUTPATIENT
Start: 2023-11-04 | End: 2023-11-06 | Stop reason: HOSPADM

## 2023-11-04 RX ORDER — FLUTICASONE FUROATE AND VILANTEROL 100; 25 UG/1; UG/1
1 POWDER RESPIRATORY (INHALATION) DAILY
Status: DISCONTINUED | OUTPATIENT
Start: 2023-11-04 | End: 2023-11-04

## 2023-11-04 RX ORDER — CHLORDIAZEPOXIDE HYDROCHLORIDE 25 MG/1
25 CAPSULE, GELATIN COATED ORAL 4 TIMES DAILY PRN
Status: DISCONTINUED | OUTPATIENT
Start: 2023-11-04 | End: 2023-11-04

## 2023-11-04 RX ORDER — LORAZEPAM 2 MG/ML
2 INJECTION INTRAMUSCULAR
Status: COMPLETED | OUTPATIENT
Start: 2023-11-04 | End: 2023-11-04

## 2023-11-04 RX ORDER — DIAZEPAM 10 MG/2ML
10 INJECTION INTRAMUSCULAR EVERY 8 HOURS
Status: DISCONTINUED | OUTPATIENT
Start: 2023-11-04 | End: 2023-11-04

## 2023-11-04 RX ORDER — ESCITALOPRAM OXALATE 5 MG/1
10 TABLET ORAL DAILY
Status: DISCONTINUED | OUTPATIENT
Start: 2023-11-04 | End: 2023-11-06 | Stop reason: HOSPADM

## 2023-11-04 RX ORDER — ENOXAPARIN SODIUM 100 MG/ML
40 INJECTION SUBCUTANEOUS EVERY 24 HOURS
Status: DISCONTINUED | OUTPATIENT
Start: 2023-11-04 | End: 2023-11-06 | Stop reason: HOSPADM

## 2023-11-04 RX ORDER — CHLORDIAZEPOXIDE HYDROCHLORIDE 25 MG/1
25 CAPSULE, GELATIN COATED ORAL
Status: DISCONTINUED | OUTPATIENT
Start: 2023-11-06 | End: 2023-11-04

## 2023-11-04 RX ORDER — CHLORDIAZEPOXIDE HYDROCHLORIDE 25 MG/1
50 CAPSULE, GELATIN COATED ORAL
Status: DISCONTINUED | OUTPATIENT
Start: 2023-11-04 | End: 2023-11-04

## 2023-11-04 RX ORDER — ARIPIPRAZOLE 5 MG/1
10 TABLET ORAL DAILY
Status: DISCONTINUED | OUTPATIENT
Start: 2023-11-05 | End: 2023-11-06 | Stop reason: HOSPADM

## 2023-11-04 RX ORDER — CHLORDIAZEPOXIDE HYDROCHLORIDE 25 MG/1
25 CAPSULE, GELATIN COATED ORAL
Status: DISCONTINUED | OUTPATIENT
Start: 2023-11-07 | End: 2023-11-04

## 2023-11-04 RX ORDER — HALOPERIDOL 5 MG/ML
INJECTION INTRAMUSCULAR
Status: COMPLETED
Start: 2023-11-04 | End: 2023-11-04

## 2023-11-04 RX ORDER — CHLORDIAZEPOXIDE HYDROCHLORIDE 25 MG/1
25 CAPSULE, GELATIN COATED ORAL
Status: DISCONTINUED | OUTPATIENT
Start: 2023-11-09 | End: 2023-11-04

## 2023-11-04 RX ORDER — FOLIC ACID 5 MG/ML
1 INJECTION, SOLUTION INTRAMUSCULAR; INTRAVENOUS; SUBCUTANEOUS DAILY
Status: COMPLETED | OUTPATIENT
Start: 2023-11-04 | End: 2023-11-04

## 2023-11-04 RX ORDER — THIAMINE HCL 100 MG
100 TABLET ORAL 3 TIMES DAILY
Status: DISCONTINUED | OUTPATIENT
Start: 2023-11-07 | End: 2023-11-06 | Stop reason: HOSPADM

## 2023-11-04 RX ORDER — LOSARTAN POTASSIUM 25 MG/1
25 TABLET ORAL DAILY
Status: DISCONTINUED | OUTPATIENT
Start: 2023-11-05 | End: 2023-11-06 | Stop reason: HOSPADM

## 2023-11-04 RX ORDER — IBUPROFEN 200 MG
24 TABLET ORAL
Status: DISCONTINUED | OUTPATIENT
Start: 2023-11-04 | End: 2023-11-06 | Stop reason: HOSPADM

## 2023-11-04 RX ORDER — IPRATROPIUM BROMIDE AND ALBUTEROL SULFATE 2.5; .5 MG/3ML; MG/3ML
3 SOLUTION RESPIRATORY (INHALATION) EVERY 6 HOURS
Status: DISCONTINUED | OUTPATIENT
Start: 2023-11-04 | End: 2023-11-06 | Stop reason: HOSPADM

## 2023-11-04 RX ORDER — CHLORDIAZEPOXIDE HYDROCHLORIDE 25 MG/1
25 CAPSULE, GELATIN COATED ORAL
Status: DISCONTINUED | OUTPATIENT
Start: 2023-11-08 | End: 2023-11-04

## 2023-11-04 RX ORDER — MAGNESIUM SULFATE HEPTAHYDRATE 40 MG/ML
2 INJECTION, SOLUTION INTRAVENOUS ONCE
Status: COMPLETED | OUTPATIENT
Start: 2023-11-04 | End: 2023-11-05

## 2023-11-04 RX ORDER — IPRATROPIUM BROMIDE AND ALBUTEROL SULFATE 2.5; .5 MG/3ML; MG/3ML
3 SOLUTION RESPIRATORY (INHALATION)
Status: COMPLETED | OUTPATIENT
Start: 2023-11-04 | End: 2023-11-04

## 2023-11-04 RX ORDER — DROPERIDOL 2.5 MG/ML
0.62 INJECTION, SOLUTION INTRAMUSCULAR; INTRAVENOUS ONCE
Status: COMPLETED | OUTPATIENT
Start: 2023-11-04 | End: 2023-11-04

## 2023-11-04 RX ORDER — ATORVASTATIN CALCIUM 10 MG/1
10 TABLET, FILM COATED ORAL DAILY
Status: DISCONTINUED | OUTPATIENT
Start: 2023-11-05 | End: 2023-11-06 | Stop reason: HOSPADM

## 2023-11-04 RX ORDER — LORAZEPAM 2 MG/ML
2 INJECTION INTRAMUSCULAR
Status: DISCONTINUED | OUTPATIENT
Start: 2023-11-04 | End: 2023-11-06 | Stop reason: HOSPADM

## 2023-11-04 RX ORDER — LORAZEPAM 2 MG/ML
1 INJECTION INTRAMUSCULAR
Status: COMPLETED | OUTPATIENT
Start: 2023-11-04 | End: 2023-11-04

## 2023-11-04 RX ORDER — IPRATROPIUM BROMIDE AND ALBUTEROL SULFATE 2.5; .5 MG/3ML; MG/3ML
3 SOLUTION RESPIRATORY (INHALATION) EVERY 4 HOURS PRN
Status: DISCONTINUED | OUTPATIENT
Start: 2023-11-04 | End: 2023-11-06 | Stop reason: HOSPADM

## 2023-11-04 RX ORDER — METOPROLOL SUCCINATE 25 MG/1
25 TABLET, EXTENDED RELEASE ORAL DAILY
Status: DISCONTINUED | OUTPATIENT
Start: 2023-11-04 | End: 2023-11-04

## 2023-11-04 RX ORDER — LEVOTHYROXINE SODIUM 50 UG/1
50 TABLET ORAL
Status: DISCONTINUED | OUTPATIENT
Start: 2023-11-05 | End: 2023-11-06 | Stop reason: HOSPADM

## 2023-11-04 RX ORDER — GLUCAGON 1 MG
1 KIT INJECTION
Status: DISCONTINUED | OUTPATIENT
Start: 2023-11-04 | End: 2023-11-06 | Stop reason: HOSPADM

## 2023-11-04 RX ORDER — LORAZEPAM 2 MG/ML
INJECTION INTRAMUSCULAR
Status: COMPLETED
Start: 2023-11-04 | End: 2023-11-04

## 2023-11-04 RX ORDER — HYDROCHLOROTHIAZIDE 25 MG/1
25 TABLET ORAL DAILY
Status: DISCONTINUED | OUTPATIENT
Start: 2023-11-04 | End: 2023-11-06 | Stop reason: HOSPADM

## 2023-11-04 RX ORDER — NALOXONE HCL 0.4 MG/ML
0.02 VIAL (ML) INJECTION
Status: DISCONTINUED | OUTPATIENT
Start: 2023-11-04 | End: 2023-11-06 | Stop reason: HOSPADM

## 2023-11-04 RX ADMIN — IPRATROPIUM BROMIDE AND ALBUTEROL SULFATE 3 ML: .5; 3 SOLUTION RESPIRATORY (INHALATION) at 10:11

## 2023-11-04 RX ADMIN — METOCLOPRAMIDE 10 MG: 5 INJECTION, SOLUTION INTRAMUSCULAR; INTRAVENOUS at 11:11

## 2023-11-04 RX ADMIN — THIAMINE HYDROCHLORIDE 100 MG: 100 INJECTION, SOLUTION INTRAMUSCULAR; INTRAVENOUS at 11:11

## 2023-11-04 RX ADMIN — SODIUM CHLORIDE 1000 ML: 9 INJECTION, SOLUTION INTRAVENOUS at 11:11

## 2023-11-04 RX ADMIN — LORAZEPAM 2 MG: 2 INJECTION INTRAMUSCULAR; INTRAVENOUS at 09:11

## 2023-11-04 RX ADMIN — CHLORDIAZEPOXIDE HYDROCHLORIDE 100 MG: 25 CAPSULE ORAL at 02:11

## 2023-11-04 RX ADMIN — PANTOPRAZOLE SODIUM 40 MG: 40 TABLET, DELAYED RELEASE ORAL at 03:11

## 2023-11-04 RX ADMIN — METOPROLOL SUCCINATE 25 MG: 25 TABLET, EXTENDED RELEASE ORAL at 03:11

## 2023-11-04 RX ADMIN — HALOPERIDOL LACTATE 2.5 MG: 5 INJECTION, SOLUTION INTRAMUSCULAR at 03:11

## 2023-11-04 RX ADMIN — HALOPERIDOL 2.5 MG: 5 INJECTION INTRAMUSCULAR at 03:11

## 2023-11-04 RX ADMIN — LORAZEPAM 2 MG: 2 INJECTION INTRAMUSCULAR; INTRAVENOUS at 06:11

## 2023-11-04 RX ADMIN — FLUTICASONE FUROATE AND VILANTEROL TRIFENATATE 1 PUFF: 100; 25 POWDER RESPIRATORY (INHALATION) at 03:11

## 2023-11-04 RX ADMIN — AZITHROMYCIN 500 MG: 500 INJECTION, POWDER, LYOPHILIZED, FOR SOLUTION INTRAVENOUS at 12:11

## 2023-11-04 RX ADMIN — HALOPERIDOL LACTATE 2.5 MG: 5 INJECTION, SOLUTION INTRAMUSCULAR at 04:11

## 2023-11-04 RX ADMIN — CHLORDIAZEPOXIDE HYDROCHLORIDE 50 MG: 25 CAPSULE ORAL at 08:11

## 2023-11-04 RX ADMIN — DROPERIDOL 0.62 MG: 2.5 INJECTION, SOLUTION INTRAMUSCULAR; INTRAVENOUS at 04:11

## 2023-11-04 RX ADMIN — LORAZEPAM 2 MG: 2 INJECTION INTRAMUSCULAR at 03:11

## 2023-11-04 RX ADMIN — ENOXAPARIN SODIUM 40 MG: 40 INJECTION SUBCUTANEOUS at 06:11

## 2023-11-04 RX ADMIN — LORAZEPAM 2 MG: 2 INJECTION INTRAMUSCULAR; INTRAVENOUS at 12:11

## 2023-11-04 RX ADMIN — FOLIC ACID 1 MG: 5 INJECTION, SOLUTION INTRAMUSCULAR; INTRAVENOUS; SUBCUTANEOUS at 11:11

## 2023-11-04 RX ADMIN — DIPHENHYDRAMINE HYDROCHLORIDE 50 MG: 50 INJECTION, SOLUTION INTRAMUSCULAR; INTRAVENOUS at 07:11

## 2023-11-04 RX ADMIN — ESCITALOPRAM OXALATE 10 MG: 5 TABLET, FILM COATED ORAL at 03:11

## 2023-11-04 RX ADMIN — ZIPRASIDONE MESYLATE 20 MG: 20 INJECTION, POWDER, LYOPHILIZED, FOR SOLUTION INTRAMUSCULAR at 07:11

## 2023-11-04 RX ADMIN — MAGNESIUM SULFATE HEPTAHYDRATE 2 G: 40 INJECTION, SOLUTION INTRAVENOUS at 10:11

## 2023-11-04 RX ADMIN — LORAZEPAM 1 MG: 2 INJECTION INTRAMUSCULAR; INTRAVENOUS at 02:11

## 2023-11-04 RX ADMIN — LORAZEPAM 2 MG: 2 INJECTION INTRAMUSCULAR; INTRAVENOUS at 11:11

## 2023-11-04 RX ADMIN — LORAZEPAM 2 MG: 2 INJECTION INTRAMUSCULAR; INTRAVENOUS at 03:11

## 2023-11-04 RX ADMIN — HYDROCHLOROTHIAZIDE 25 MG: 25 TABLET ORAL at 03:11

## 2023-11-04 RX ADMIN — CEFTRIAXONE 1 G: 1 INJECTION, POWDER, FOR SOLUTION INTRAMUSCULAR; INTRAVENOUS at 01:11

## 2023-11-04 NOTE — ASSESSMENT & PLAN NOTE
See Notes for history    Given she is a known CLL patient presenting with a significant leukocytosis which is greatly changed even since her visit to Hematology in October I will consult Hematology    -Peripheral smear  -Lactate  -Coags
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
## 2023-11-04 NOTE — SUBJECTIVE & OBJECTIVE
Past Medical History:   Diagnosis Date    Anemia     Anemia     Controlled type 2 diabetes mellitus without complication, without long-term current use of insulin 11/30/2021    COPD (chronic obstructive pulmonary disease)     Depression     Diverticulitis     Fatty liver     GERD (gastroesophageal reflux disease)     Hyperlipidemia     Hypertension     Pancreatitis     Peptic ulcer disease     Polysubstance abuse     Posterior reversible encephalopathy syndrome     Sarcoidosis of lung     Sarcoidosis of lung     over 30 yrs ago    Seizures     7/2017    Suicide attempt     Suicide ideation        Past Surgical History:   Procedure Laterality Date    APPENDECTOMY      BILATERAL MASTECTOMY Bilateral 10/29/2020    Procedure: MASTECTOMY, BILATERAL;  Surgeon: Baylee Kevin MD;  Location: [address];  Service: General;  Laterality: Bilateral;    BREAST REVISION SURGERY Bilateral 2/11/2021    Procedure: BREAST REVISION SURGERY;  Surgeon: Scottie Johnson MD;  Location: [facility] OR [address] [place] [address];  Service: Plastics;  Laterality: Bilateral;    COLONOSCOPY N/A 7/28/2017    Procedure: COLONOSCOPY;  Surgeon: Aaron Alvarado MD;  Location: [facility];  Service: Endoscopy;  Laterality: N/A;    ESOPHAGOGASTRODUODENOSCOPY  10/7/2016, 11/6/2014    2016 - gastritis, duodenitis, 2014 erosive gastritis    ESOPHAGOGASTRODUODENOSCOPY N/A 2/11/2020    Procedure: ESOPHAGOGASTRODUODENOSCOPY (EGD);  Surgeon: Fawn Garrido MD;  Location: [facility];  Service: Endoscopy;  Laterality: N/A;    ESOPHAGOGASTRODUODENOSCOPY N/A 4/19/2021    Procedure: EGD (ESOPHAGOGASTRODUODENOSCOPY);  Surgeon: Paramjit Martino MD;  Location: [facility];  Service: Endoscopy;  Laterality: N/A;    FLEXIBLE SIGMOIDOSCOPY  11/06/2014    colitis    HYSTERECTOMY      IMPLANTATION OF PERMANENT SACRAL NERVE STIMULATOR N/A 7/12/2022    Procedure: INSERTION, NEUROSTIMULATOR, PERMANENT, SACRAL;  Surgeon: Juaquin Edwards MD;  Location: [facility] OR [address] [place] [address];  Service:  Urology;  Laterality: N/A;  1hr    INJECTION FOR SENTINEL NODE IDENTIFICATION Right 10/29/2020    Procedure: INJECTION, FOR SENTINEL NODE IDENTIFICATION;  Surgeon: Baylee Kevin MD;  [facility];  Service: General;  Laterality: Right;    INJECTION OF JOINT Right 10/10/2019    Procedure: Injection, Joint RIGHT ILIOPSOAS BURSA/TENDON INJECTION AND RIGHT GLUTEAL TENDON INJECTION WITH STEROID AND LIDOCAINE;  Surgeon: Guillaume Rico MD;  Location: [facility];  Service: Pain Management;  Laterality: Right;  NEEDS CONSENT    INSERTION OF BREAST TISSUE EXPANDER Bilateral 10/29/2020    Procedure: INSERTION, TISSUE EXPANDER, BREAST;  Surgeon: Scottie Johnson MD;  Location: [facility] OR [address] [place] [address];  Service: Plastics;  Laterality: Bilateral;  Right breast: 1082 g  Left breast: 1076 g    LIPOSUCTION Bilateral 2/11/2021    Procedure: LIPOSUCTION;  Surgeon: Scottie Johnson MD;  Location: [facility] OR [address] [place] [address];  Service: Plastics;  Laterality: Bilateral;    mediastenoscopy      REPLACEMENT OF IMPLANT OF BREAST Bilateral 2/11/2021    Procedure: REPLACEMENT, IMPLANT, BREAST;  Surgeon: Scottie Johnson MD;  Location: [address];  Service: Plastics;  Laterality: Bilateral;    SENTINEL LYMPH NODE BIOPSY Right 10/29/2020    Procedure: BIOPSY, LYMPH NODE, SENTINEL;  Surgeon: Balyee Kevin MD;  Location: [address];  Service: General;  Laterality: Right;    TONSILLECTOMY N/A 1970    TUBAL LIGATION         Review of patient's allergies indicates:   Allergen Reactions    Lortab [hydrocodone-acetaminophen] Itching    Promethazine Itching and Other (See Comments)       Family History       Problem Relation (Age of Onset)    Breast cancer Maternal Aunt, Daughter    Colon cancer Maternal Uncle    Diabetes Father, Mother    Heart attack Father    Hypertension Father, Mother          Tobacco Use    Smoking status: Every Day     Current packs/day: 0.00     Average packs/day: 0.5 packs/day for 30.0 years (15.0  ttl pk-yrs)     Types: Vaping with nicotine, Cigarettes     Start date: 1991     Last attempt to quit: 2021     Years since quittin.7    Smokeless tobacco: Never    Tobacco comments:     Patient is currently smoking 10 cigarettes a day, declines nicotine patches   Substance and Sexual Activity    Alcohol use: Yes     Comment: vodka daily (half a regular bottle) for 4 days    Drug use: Yes     Types: Marijuana     Comment: gummies    Sexual activity: Yes     Birth control/protection: Surgical      Review of Systems  Objective:     Vital Signs (Most Recent):  Temp: 99 °F (37.2 °C) (23)  Pulse: (!) 122 (23)  Resp: (!) 24 (23)  BP: (!) 173/72 (23)  SpO2: (!) 92 % (23) Vital Signs (24h Range):  Temp:  [97.7 °F (36.5 °C)-99 °F (37.2 °C)] 99 °F (37.2 °C)  Pulse:  [100-126] 122  Resp:  [16-24] 24  SpO2:  [92 %-98 %] 92 %  BP: (129-173)/(57-81) 173/72   Weight: 87.1 kg (192 lb)  Body mass index is 30.99 kg/m².    No intake or output data in the 24 hours ending 23       Physical Exam  Vitals reviewed. Exam conducted with a chaperone present.   Constitutional:       General: She is not in acute distress.     Appearance: She is obese. She is not ill-appearing.      Comments: Pt sedated with benzos and haldol   HENT:      Head: Normocephalic and atraumatic.      Nose: Nose normal.   Eyes:      Extraocular Movements: Extraocular movements intact.      Pupils: Pupils are equal, round, and reactive to light.   Cardiovascular:      Rate and Rhythm: Tachycardia present.      Pulses: Normal pulses.      Heart sounds: Normal heart sounds.   Pulmonary:      Effort: Pulmonary effort is normal. No respiratory distress.      Breath sounds: Normal breath sounds. No stridor. No wheezing, rhonchi or rales.   Abdominal:      General: Abdomen is flat. There is no distension.      Palpations: Abdomen is soft.      Tenderness: There is no abdominal tenderness. There is  no guarding or rebound.   Musculoskeletal:      Cervical back: Normal range of motion and neck supple.      Comments: Left hand swollen and bruised.  Abrasions and bruises over UE bilat   Skin:     General: Skin is warm and dry.      Findings: Bruising present.   Neurological:      Mental Status: She is oriented to person, place, and time.   Psychiatric:      Comments: Agitated and restless    CIWA - 16            Vents:     Lines/Drains/Airways       Peripheral Intravenous Line  Duration                  Peripheral IV - Single Lumen 11/04/23 1119 22 G Anterior;Right Hand <1 day                  Significant Labs:    CBC/Anemia Profile:  Recent Labs   Lab 11/04/23  1120 11/04/23  1123 11/04/23  1329   WBC 33.05*  --   --    HGB 11.6*  --   --    HCT 39.4 38 35*     --   --    MCV 88  --   --    RDW 17.2*  --   --    IRON 269*  --   --         Chemistries:  Recent Labs   Lab 11/04/23  1319      K 5.5*      CO2 16*   BUN 31*   CREATININE 0.9   CALCIUM 8.9   ALBUMIN 4.3   PROT 7.4   BILITOT 0.4   ALKPHOS 63   ALT 23   AST 34       All pertinent labs within the past 24 hours have been reviewed.    Significant Imaging: I have reviewed all pertinent imaging results/findings within the past 24 hours.

## 2023-11-04 NOTE — ASSESSMENT & PLAN NOTE
Chronic, uncontrolled. Latest blood pressure and vitals reviewed-     Temp:  [97.7 °F (36.5 °C)-98.5 °F (36.9 °C)]   Pulse:  [100-126]   Resp:  [16-20]   BP: (129-159)/(57-81)   SpO2:  [92 %-98 %] .   Home meds for hypertension were reviewed and noted below.   Hypertension Medications               cloNIDine (CATAPRES) 0.1 MG tablet Take by mouth.    hydroCHLOROthiazide (HYDRODIURIL) 25 MG tablet     lisinopriL (PRINIVIL,ZESTRIL) 20 MG tablet Take 1 tablet (20 mg total) by mouth once daily.    losartan (COZAAR) 25 MG tablet Take 1 tablet by mouth once daily.    metoprolol succinate (TOPROL-XL) 50 MG 24 hr tablet     propranoloL (INDERAL) 20 MG tablet Take 20 mg by mouth 2 (two) times daily.            While in the hospital, will manage blood pressure as follows; Continue home antihypertensive regimen  Home meds

## 2023-11-04 NOTE — PLAN OF CARE
SW attempted to complete assessment with pt; security was outside of pt room for an incident and attempts to de-escalate were being made.    DALILA/CM to follow-up at a later time      Haydee Casanova CD, MSW, LMSW, RSW   Case Management  [facility]  Email: [email or web address]

## 2023-11-04 NOTE — HPI
Earl Abdul is a 63 year old female with depression, ETOH abuse, diabetes mellitus, COPD (2L home O2), HTN, HLD, hypothyroidism who presented to [facility] ED on 11/4/23 for depression.  History obtained via chart review as patient drowsy upon my interview. Patient reports daily drinking of 1 bottle of hard liquor, feels depressed as her  left her yesterday.  She called EMS as she wanted to stop drinking and go to rehab. She reports hx of alcohol withdrawal seizures. Denies SI/HI.  Last alcoholic drink this morning.      While in the ED, patient became agitated and aggressive. She received total of 5mg of lorazepam and haldol 5mg IV x1.   Patient was admitted to hospital medicine for ETOH withdrawal management. She was started on chlordiazepoxide taper. Agitation worsened and droperidol .625 mg, ziprasidone 20 mg and benadryl 50 mg IV was given. Patient also PEC'd during this time. Critical care medicine consulted for agitation with ETOH withdrawal.

## 2023-11-04 NOTE — SUBJECTIVE & OBJECTIVE
Past Medical History:   Diagnosis Date    Anemia     Anemia     Controlled type 2 diabetes mellitus without complication, without long-term current use of insulin 11/30/2021    COPD (chronic obstructive pulmonary disease)     Depression     Diverticulitis     Fatty liver     GERD (gastroesophageal reflux disease)     Hyperlipidemia     Hypertension     Pancreatitis     Peptic ulcer disease     Polysubstance abuse     Posterior reversible encephalopathy syndrome     Sarcoidosis of lung     Sarcoidosis of lung     over 30 yrs ago    Seizures     7/2017    Suicide attempt     Suicide ideation        Past Surgical History:   Procedure Laterality Date    APPENDECTOMY      BILATERAL MASTECTOMY Bilateral 10/29/2020    Procedure: MASTECTOMY, BILATERAL;  Surgeon: Baylee Kevin MD;  Location: [address];  Service: General;  Laterality: Bilateral;    BREAST REVISION SURGERY Bilateral 2/11/2021    Procedure: BREAST REVISION SURGERY;  Surgeon: Scottie Johnson MD;  Location: [facility] OR [address] [place] [address];  Service: Plastics;  Laterality: Bilateral;    COLONOSCOPY N/A 7/28/2017    Procedure: COLONOSCOPY;  Surgeon: Aaron Alvarado MD;  Location: [facility];  Service: Endoscopy;  Laterality: N/A;    ESOPHAGOGASTRODUODENOSCOPY  10/7/2016, 11/6/2014    2016 - gastritis, duodenitis, 2014 erosive gastritis    ESOPHAGOGASTRODUODENOSCOPY N/A 2/11/2020    Procedure: ESOPHAGOGASTRODUODENOSCOPY (EGD);  Surgeon: Fawn Garrido MD;  Location: [facility];  Service: Endoscopy;  Laterality: N/A;    ESOPHAGOGASTRODUODENOSCOPY N/A 4/19/2021    Procedure: EGD (ESOPHAGOGASTRODUODENOSCOPY);  Surgeon: Paramjit Martino MD;  Location: [facility];  Service: Endoscopy;  Laterality: N/A;    FLEXIBLE SIGMOIDOSCOPY  11/06/2014    colitis    HYSTERECTOMY      IMPLANTATION OF PERMANENT SACRAL NERVE STIMULATOR N/A 7/12/2022    Procedure: INSERTION, NEUROSTIMULATOR, PERMANENT, SACRAL;  Surgeon: Juaquin Edwards MD;  Location: [facility] OR [address] [place] [address];  Service:  Urology;  Laterality: N/A;  1hr    INJECTION FOR SENTINEL NODE IDENTIFICATION Right 10/29/2020    Procedure: INJECTION, FOR SENTINEL NODE IDENTIFICATION;  Surgeon: Baylee Kevin MD;  [facility];  Service: General;  Laterality: Right;    INJECTION OF JOINT Right 10/10/2019    Procedure: Injection, Joint RIGHT ILIOPSOAS BURSA/TENDON INJECTION AND RIGHT GLUTEAL TENDON INJECTION WITH STEROID AND LIDOCAINE;  Surgeon: Guillaume Rico MD;  Location: [facility];  Service: Pain Management;  Laterality: Right;  NEEDS CONSENT    INSERTION OF BREAST TISSUE EXPANDER Bilateral 10/29/2020    Procedure: INSERTION, TISSUE EXPANDER, BREAST;  Surgeon: Scottie Johnson MD;  Location: [facility] OR [address] [place] [address];  Service: Plastics;  Laterality: Bilateral;  Right breast: 1082 g  Left breast: 1076 g    LIPOSUCTION Bilateral 2/11/2021    Procedure: LIPOSUCTION;  Surgeon: Scottie Johnson MD;  Location: [facility] OR [address] [place] [address];  Service: Plastics;  Laterality: Bilateral;    mediastenoscopy      REPLACEMENT OF IMPLANT OF BREAST Bilateral 2/11/2021    Procedure: REPLACEMENT, IMPLANT, BREAST;  Surgeon: Scottie Johnson MD;  Location: [address];  Service: Plastics;  Laterality: Bilateral;    SENTINEL LYMPH NODE BIOPSY Right 10/29/2020    Procedure: BIOPSY, LYMPH NODE, SENTINEL;  Surgeon: Bayele Kevin MD;  Location: [address];  Service: General;  Laterality: Right;    TONSILLECTOMY N/A 1970    TUBAL LIGATION         Review of patient's allergies indicates:   Allergen Reactions    Lortab [hydrocodone-acetaminophen] Itching    Promethazine Itching and Other (See Comments)       Current Facility-Administered Medications on File Prior to Encounter   Medication    albuterol sulfate nebulizer solution 2.5 mg     Current Outpatient Medications on File Prior to Encounter   Medication Sig    acetaminophen (TYLENOL) 500 MG tablet Take 500-1,500 mg by mouth daily as needed for Pain.    albuterol (PROVENTIL/VENTOLIN HFA) 90  mcg/actuation inhaler     anastrozole (ARIMIDEX) 1 mg Tab     ARIPiprazole (ABILIFY) 10 MG Tab Take 1 tablet by mouth once daily.    ARIPiprazole (ABILIFY) 2 MG Tab Take 1 tablet by mouth once daily.    ARIPiprazole (ABILIFY) 5 MG Tab Take 1 tablet by mouth every morning.    atorvastatin (LIPITOR) 10 MG tablet Take 1 tablet by mouth once daily.    busPIRone (BUSPAR) 15 MG tablet     butalbital-acetaminophen-caffeine -40 mg (FIORICET, ESGIC) -40 mg per tablet Take 1 tablet by mouth every 4 (four) hours as needed.    cholecalciferol, vitamin D3, (VITAMIN D3) 50 mcg (2,000 unit) Cap capsule TAKE 1 CAPSULE BY MOUTH ONCE A DAY IN THE MORNING    cloNIDine (CATAPRES) 0.1 MG tablet Take by mouth.    diazePAM (VALIUM) 5 MG tablet Take 2 tablets (10 mg total) by mouth every 8 (eight) hours for 3 days, THEN 2 tablets (10 mg total) every 12 (twelve) hours for 3 days, THEN 1 tablet (5 mg total) every 12 (twelve) hours for 3 days, THEN 1 tablet (5 mg total) once daily for 3 days, THEN 0.5 tablets (2.5 mg total) once daily for 3 days.    dicyclomine (BENTYL) 20 mg tablet Take 20 mg by mouth 4 (four) times daily.    doxepin (SINEQUAN) 150 MG Cap Take 150 mg by mouth every evening.    DULoxetine (CYMBALTA) 60 MG capsule Take 60 mg by mouth.    EScitalopram oxalate (LEXAPRO) 10 MG tablet Take 10 mg by mouth once daily.    FLUoxetine 20 MG capsule Take 3 capsules by mouth once daily.    fluticasone furoate-vilanteroL (BREO ELLIPTA) 100-25 mcg/dose diskus inhaler Inhale 1 puff into the lungs once daily. Controller    folic acid (FOLVITE) 1 MG tablet Take 1 tablet (1 mg total) by mouth once daily.    gabapentin (NEURONTIN) 600 MG tablet Take 1 tablet (600 mg total) by mouth 3 (three) times daily. for 10 days    hydroCHLOROthiazide (HYDRODIURIL) 25 MG tablet     hydrOXYzine pamoate (VISTARIL) 50 MG Cap TAKE ONE CAPSULE BY MOUTH EVERY 6 HOURS AS NEEDED FOR ANXIETY AND/OR EVERY NIGHT AT BEDTIME AS NEEDED FOR INSOMNIA     ipratropium (ATROVENT HFA) 17 mcg/actuation inhaler Inhale 2 puffs into the lungs every 6 (six) hours as needed.    letrozole (FEMARA) 2.5 mg Tab     levothyroxine (SYNTHROID) 50 MCG tablet TAKE 1 TABLET(50 MCG) BY MOUTH BEFORE BREAKFAST    lisinopriL (PRINIVIL,ZESTRIL) 20 MG tablet Take 1 tablet (20 mg total) by mouth once daily.    loperamide (IMODIUM) 2 mg capsule Take 2 mg by mouth 4 (four) times daily as needed for Diarrhea (Per package directions).    losartan (COZAAR) 25 MG tablet Take 1 tablet by mouth once daily.    melatonin (MELATIN) Take by mouth nightly as needed for Insomnia.    metFORMIN (GLUCOPHAGE-XR) 500 MG ER 24hr tablet Take 2 tablets (1,000 mg total) by mouth 2 (two) times daily with meals.    metoprolol succinate (TOPROL-XL) 50 MG 24 hr tablet     mirtazapine (REMERON) 30 MG tablet     multivitamin Tab Take 1 tablet by mouth once daily.    nabumetone (RELAFEN) 500 MG tablet Take 500 mg by mouth 2 (two) times daily.    ondansetron (ZOFRAN-ODT) 4 MG TbDL Take 1 tablet (4 mg total) by mouth every 6 (six) hours as needed (nausea/vomiting).    pantoprazole (PROTONIX) 40 MG tablet Take 40 mg by mouth once daily.    propranoloL (INDERAL) 20 MG tablet Take 20 mg by mouth 2 (two) times daily.    thiamine 100 MG tablet Take 1 tablet (100 mg total) by mouth once daily.    traZODone (DESYREL) 300 MG tablet Take 1 tablet by mouth every evening.     Family History       Problem Relation (Age of Onset)    Breast cancer Maternal Aunt, Daughter    Colon cancer Maternal Uncle    Diabetes Father, Mother    Heart attack Father    Hypertension Father, Mother          Tobacco Use    Smoking status: Every Day     Current packs/day: 0.00     Average packs/day: 0.5 packs/day for 30.0 years (15.0 ttl pk-yrs)     Types: Vaping with nicotine, Cigarettes     Start date: 1991     Last attempt to quit: 2021     Years since quittin.7    Smokeless tobacco: Never    Tobacco comments:     Patient is currently smoking  10 cigarettes a day, declines nicotine patches   Substance and Sexual Activity    Alcohol use: Yes     Comment: vodka daily (half a regular bottle) for 4 days    Drug use: Yes     Types: Marijuana     Comment: gummies    Sexual activity: Yes     Birth control/protection: Surgical     Review of Systems   Unable to perform ROS: Mental status change   Psychiatric/Behavioral:  Positive for agitation, confusion and decreased concentration. The patient is hyperactive.      Objective:     Vital Signs (Most Recent):  Temp: 98.5 °F (36.9 °C) (11/04/23 1330)  Pulse: (!) 126 (11/04/23 1330)  Resp: 20 (11/04/23 1330)  BP: (!) 159/81 (11/04/23 1330)  SpO2: (!) 93 % (11/04/23 1330) Vital Signs (24h Range):  Temp:  [97.7 °F (36.5 °C)-98.5 °F (36.9 °C)] 98.5 °F (36.9 °C)  Pulse:  [100-126] 126  Resp:  [16-20] 20  SpO2:  [92 %-98 %] 93 %  BP: (129-159)/(57-81) 159/81     Weight: 87.1 kg (192 lb)  Body mass index is 30.99 kg/m².     Physical Exam  Vitals and nursing note reviewed.   Constitutional:       General: She is in acute distress.      Appearance: She is obese. She is ill-appearing and diaphoretic.   HENT:      Head: Normocephalic.   Eyes:      Conjunctiva/sclera: Conjunctivae normal.   Cardiovascular:      Rate and Rhythm: Tachycardia present.   Pulmonary:      Effort: Pulmonary effort is normal. No respiratory distress.      Breath sounds: Normal breath sounds.   Abdominal:      General: Abdomen is flat. There is no distension.      Tenderness: There is no abdominal tenderness.   Skin:     Findings: Bruising, lesion and rash present.      Comments: Abrasions throughout skin of arms   Psychiatric:      Comments: Very agitated. Alert but not linear                 Significant Labs: All pertinent labs within the past 24 hours have been reviewed.    Significant Imaging: I have reviewed all pertinent imaging results/findings within the past 24 hours.

## 2023-11-04 NOTE — ED NOTES
PT TRANSFERRED TO UNIT WITH NURSE, TECH AND SECURITY X 2 IN 4 PT SOFT RESTRAINTS. SITTER AT BEDSIDE WITH PT UPON TRANSFER, BELONGINGS GIVEN TO SITTER

## 2023-11-04 NOTE — ASSESSMENT & PLAN NOTE
The patient has a significant home medication list and I am unable to verify them at this time  Denying SI HI but lacks insight into the severity of her disease  Psychiatry consult for recommendations with regards to initiation of medication given significant dosing and redundancies on med list as well as now on benzos for withdrawal

## 2023-11-04 NOTE — NURSING
"Notified Luis BECERRA that pt's potassium order  while in dialysis. Luis BECERRA stated, " think that we can avoid supplemental K. We will leave that to the nephrologist". No new orders given. Pt remains AAOx4 in bed with the call light in reach and the bed alarm on.  "

## 2023-11-04 NOTE — NURSING
Nurses Note -- 4 Eyes      11/4/2023         Skin assessed during: Admit      [x] No Altered Skin Integrity Present    []Prevention Measures Documented      [] Yes- Altered Skin Integrity Present or Discovered   [] LDA Added if Not in Epic (Describe Wound)   [] New Altered Skin Integrity was Present on Admit and Documented in LDA   [] Wound Image Taken    Wound Care Consulted? No    Attending Nurse:  Nadir Shepherd RN/Staff Member:   CLAUDIA Richardson

## 2023-11-04 NOTE — ED PROVIDER NOTES
CC: Depression (States  left her 4 d ago, increased depression. Alcohol dependent and has ETOH on board today. Hasn't been compliant with psych meds. Denies SI/HI)      History provided by:   Patient     HPI: Earl Abdul is a 63 y.o. year old female PMH of depression, ETOH abuse who presents to the ED for depression    Patient reports daily drinking of 1 bottle of hard liquor, feels depressed as her   left her yesterday    She called EMS as she wanted to stop drinking and go to rehab    She denies SI/HI  She reports hx of alcohol withdrawal seizures before  Reports hx of COPD on 2 L oxygen at night  + vomiting recently, denies abdominal pain    Reports fall, unsure when, it happened while going up the steps, denies head injury but reports a headache    Patient gets frustrated and unable to provide details  of the fall or description of the headache    She reports that she does not want to go to a psychiatric facility, she believes she would be able to go home    Last alcoholic drink this am, unsure when she started having UE shaking      PHYSICAL EXAM:  Vitals:    11/04/23 1016   BP:    Pulse:    Resp:    Temp: 97.7 °F (36.5 °C)     VS: triage VS reviewed    general: intoxicated, gets frustrated easily  HENT: neck symmetric, trachea midline  Eyes: PERRL,no conjunctival injection and symmetrical eyelids, no jaundice  CV: RRR, no  murmurs, no rubs, no gallops, no LE edema  Resp: comfortable breathing, speaks in full sentences, mild wheezing  ABD:  soft, ND, no masses, + normal BS, NT  Renal: No CVAT  Neuro: AAO x 2 not sure where she was brought by EMS, 5/5 strength x 4 extremities, sensation intact, face symmetric, speech normal, sudden short episodes of intermittent shaking of the UE   MSK: NC/AT, extremities w/out deformity   Skin: warm, dry  Psych: labile mood, poor insight      MDM:  Earl Abdul is a 63 y.o. year old female who presents to the ED for ETOH intox requesting  help with  "rehab/detox, denies SI/HI, reports she thinks she is able to function at home  Noticed to be hypoxic on RA, started on 2 L NC, possible aspiration pneumonia vs COPD exacerbation      DDX includes but not limited to: alcohol intox vs withdrawal, depression,agitation, infectious etiology, electrolyte abnormality, COPD    Labs ordered and reviewed:   Lipase  Cmp K 5.5, cr wnl, BG 64  Cpk pending  Tsh  Ua trace leuks, neg blood and nitrites  Uds  Etoh 211  Acetaminophen neg  pH 7.34  WBC 33, plt wnl, Hg 11.6  VBG pH 7.34  UDS neg    Medication given in the ED: IVF, thiamine, folic acid, duoneb    CXR (ordered and reviewed): No acute abnormality or detrimental change since September 18, 2023.   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 Gonzalez with recommendations for observation.    Of note cll fish done on pb and favorable 13 q del noted.  Not tp53 or IgHV sequencing sent.    Case discussed with the consultant: n/a  Of note underwent bilateral mastectomy for  T1b N0 stage IA breast cancer October 2020 with no adjuvant therapy and was started on  Letrozole February 2021-may 2021.  Of note was referred to Dr. Gonzalez June 2022 for leukocytosis with flow cytometry of PB cw with cll vs other indolent b cell lymphoma phenotype.     MBCL/CLL; did meet CLL criteria on one cbc June 2022 but has since returned to MBCL criteria  -confirmed on June 2022 PB flow cytometry  -continues to be asymptomatic from CLL hematologic perspective with normal physical exam   -no indication for imaging or marrow biopsy currently   -favorable risk 13 q del by fish; for furhter prognostication have today sent  tp53 sequencing and ighv mutation which is pending   -she has chronic mild anemia/thrombocytopenia that is likely due to her etoh related " "liver disease for which she follows with hepatology;   normal nutritional evaluation jan 2022; do not feel cytopenias are related to her Lymphoproliferative disorder   -will follow pt q 6 months with labs and provider appts  -educated on symptoms for which to seek attn in our clinic earlier   -fu with med onc for breast cancer recurrence surveillance             10:48 AM    Stopped the duoneb due to taste    12:43 PM  Became agitated, requested to leave, reports her "body is jumping", poor insight, repeats that she wants us to call her  (she already spoke to her ), unable to answer basic questions regarding her medical condition. Every comment or question she answers with " I want to leave" I do not think she has insight or understands her condition. I do not think she is safe to go home. PEC-ed, ativan 2 mg iv for agitation, antibiotics for possible PNA (vomited, intoxicated, hypoxic)    2:27 PM  Tachycardic, mild agitation, asking for a different bed. Haldol 5 mg, ativan 1 mg, D5NS    IMPRESSION:  1.) etoh intoxication  2.) agitation  3. hypoxia    Dispo: admit    Aggregate Critical Care Time by Attending (exclusive of procedural time) = 30 minutes         During the Emergency Depment visit, there was a high probability of imminent or life threatening deterioration in the patient's condition necessitating medical decision  making of a high complexity. Organ systems that were compromised/potentially compromised  central nervous system  pulmonary       and/or there was potential for sepsis or metabolic failure.     Pt required constant monitoring because of the potential for them to deteriorate at any moment. Critical care was time spent personally by me on the following activities:  Development of treatment plan with patient or surrogate; discussion with consultant, evaluation of patient's response to treatment, examination of patient, obtaining history from patient or surrogate, ordering and " performing treatments and interventions, ordering and reviewing of laboratory studies, ordering and review of radiographic studies, vitals, re-evaluation of patient' condition and review of old charts            The failure to initiate the interventions performed in the Emergency Department on an urgent basis would have likely resulted in sudden, clinically significant or life threatening deterioration in the patient's condition.                                 Past Medical History:   Diagnosis Date    Anemia     Anemia     Controlled type 2 diabetes mellitus without complication, without long-term current use of insulin 11/30/2021    COPD (chronic obstructive pulmonary disease)     Depression     Diverticulitis     Fatty liver     GERD (gastroesophageal reflux disease)     Hyperlipidemia     Hypertension     Pancreatitis     Peptic ulcer disease     Polysubstance abuse     Posterior reversible encephalopathy syndrome     Sarcoidosis of lung     Sarcoidosis of lung     over 30 yrs ago    Seizures     7/2017    Suicide attempt     Suicide ideation      Past Surgical History:   Procedure Laterality Date    APPENDECTOMY      BILATERAL MASTECTOMY Bilateral 10/29/2020    Procedure: MASTECTOMY, BILATERAL;  Surgeon: Baylee Kevin MD;  Location: [facility] OR [address] [place] [address];  Service: General;  Laterality: Bilateral;    BREAST REVISION SURGERY Bilateral 2/11/2021    Procedure: BREAST REVISION SURGERY;  Surgeon: Scottie Johnson MD;  Location: [facility] OR [address] [place] [address];  Service: Plastics;  Laterality: Bilateral;    COLONOSCOPY N/A 7/28/2017    Procedure: COLONOSCOPY;  Surgeon: Aaron Alvarado MD;  Location: [facility];  Service: Endoscopy;  Laterality: N/A;    ESOPHAGOGASTRODUODENOSCOPY  10/7/2016, 11/6/2014    2016 - gastritis, duodenitis, 2014 erosive gastritis    ESOPHAGOGASTRODUODENOSCOPY N/A 2/11/2020    Procedure: ESOPHAGOGASTRODUODENOSCOPY (EGD);  Surgeon: Fawn Garrido MD;  Location: [facility];  Service: Endoscopy;  Laterality:  N/A;    ESOPHAGOGASTRODUODENOSCOPY N/A 4/19/2021    Procedure: EGD (ESOPHAGOGASTRODUODENOSCOPY);  Surgeon: Paramjit Martino MD;  Location: [facility] ENDO;  Service: Endoscopy;  Laterality: N/A;    FLEXIBLE SIGMOIDOSCOPY  11/06/2014    colitis    HYSTERECTOMY      IMPLANTATION OF PERMANENT SACRAL NERVE STIMULATOR N/A 7/12/2022    Procedure: INSERTION, NEUROSTIMULATOR, PERMANENT, SACRAL;  Surgeon: Juaquin Edwards MD;  Location: [address];  Service: Urology;  Laterality: N/A;  1hr    INJECTION FOR SENTINEL NODE IDENTIFICATION Right 10/29/2020    Procedure: INJECTION, FOR SENTINEL NODE IDENTIFICATION;  Surgeon: Baylee Kevin MD;  Location: [address];  Service: General;  Laterality: Right;    INJECTION OF JOINT Right 10/10/2019    Procedure: Injection, Joint RIGHT ILIOPSOAS BURSA/TENDON INJECTION AND RIGHT GLUTEAL TENDON INJECTION WITH STEROID AND LIDOCAINE;  Surgeon: Guillaume Rico MD;  Location: [facility] PAIN MGT;  Service: Pain Management;  Laterality: Right;  NEEDS CONSENT    INSERTION OF BREAST TISSUE EXPANDER Bilateral 10/29/2020    Procedure: INSERTION, TISSUE EXPANDER, BREAST;  Surgeon: Scottie Johnson MD;  Location: [address];  Service: Plastics;  Laterality: Bilateral;  Right breast: 1082 g  Left breast: 1076 g    LIPOSUCTION Bilateral 2/11/2021    Procedure: LIPOSUCTION;  Surgeon: Scottie Johnson MD;  Location: [address];  Service: Plastics;  Laterality: Bilateral;    mediastenoscopy      REPLACEMENT OF IMPLANT OF BREAST Bilateral 2/11/2021    Procedure: REPLACEMENT, IMPLANT, BREAST;  Surgeon: Scottie Johnson MD;  Location: [address];  Service: Plastics;  Laterality: Bilateral;    SENTINEL LYMPH NODE BIOPSY Right 10/29/2020    Procedure: BIOPSY, LYMPH NODE, SENTINEL;  Surgeon: Baylee Kevin MD;  Location: [address];  Service: General;  Laterality: Right;    TONSILLECTOMY N/A 1970    TUBAL LIGATION       Family History   Problem Relation Age of Onset     Heart attack Father     Diabetes Father     Hypertension Father     Diabetes Mother     Hypertension Mother     Breast cancer Maternal Aunt     Colon cancer Maternal Uncle     Breast cancer Daughter     Ovarian cancer Neg Hx     Cancer Neg Hx      Current Facility-Administered Medications on File Prior to Encounter   Medication Dose Route Frequency Provider Last Rate Last Admin    albuterol sulfate nebulizer solution 2.5 mg  2.5 mg Nebulization Once Andrew Rodriguez MD         Current Outpatient Medications on File Prior to Encounter   Medication Sig Dispense Refill    acetaminophen (TYLENOL) 500 MG tablet Take 500-1,500 mg by mouth daily as needed for Pain.      albuterol (PROVENTIL/VENTOLIN HFA) 90 mcg/actuation inhaler       anastrozole (ARIMIDEX) 1 mg Tab       ARIPiprazole (ABILIFY) 10 MG Tab Take 1 tablet by mouth once daily.      ARIPiprazole (ABILIFY) 2 MG Tab Take 1 tablet by mouth once daily.      ARIPiprazole (ABILIFY) 5 MG Tab Take 1 tablet by mouth every morning.      atorvastatin (LIPITOR) 10 MG tablet Take 1 tablet by mouth once daily.      busPIRone (BUSPAR) 15 MG tablet       butalbital-acetaminophen-caffeine -40 mg (FIORICET, ESGIC) -40 mg per tablet Take 1 tablet by mouth every 4 (four) hours as needed.      cholecalciferol, vitamin D3, (VITAMIN D3) 50 mcg (2,000 unit) Cap capsule TAKE 1 CAPSULE BY MOUTH ONCE A DAY IN THE MORNING      cloNIDine (CATAPRES) 0.1 MG tablet Take by mouth.      diazePAM (VALIUM) 5 MG tablet Take 2 tablets (10 mg total) by mouth every 8 (eight) hours for 3 days, THEN 2 tablets (10 mg total) every 12 (twelve) hours for 3 days, THEN 1 tablet (5 mg total) every 12 (twelve) hours for 3 days, THEN 1 tablet (5 mg total) once daily for 3 days, THEN 0.5 tablets (2.5 mg total) once daily for 3 days. 41 tablet 0    dicyclomine (BENTYL) 20 mg tablet Take 20 mg by mouth 4 (four) times daily.      doxepin (SINEQUAN) 150 MG Cap Take 150 mg by mouth every evening.       DULoxetine (CYMBALTA) 60 MG capsule Take 60 mg by mouth.      EScitalopram oxalate (LEXAPRO) 10 MG tablet Take 10 mg by mouth once daily.      FLUoxetine 20 MG capsule Take 3 capsules by mouth once daily.      fluticasone furoate-vilanteroL (BREO ELLIPTA) 100-25 mcg/dose diskus inhaler Inhale 1 puff into the lungs once daily. Controller 60 each 3    folic acid (FOLVITE) 1 MG tablet Take 1 tablet (1 mg total) by mouth once daily. 30 tablet 11    gabapentin (NEURONTIN) 600 MG tablet Take 1 tablet (600 mg total) by mouth 3 (three) times daily. for 10 days 30 tablet 0    hydroCHLOROthiazide (HYDRODIURIL) 25 MG tablet       hydrOXYzine pamoate (VISTARIL) 50 MG Cap TAKE ONE CAPSULE BY MOUTH EVERY 6 HOURS AS NEEDED FOR ANXIETY AND/OR EVERY NIGHT AT BEDTIME AS NEEDED FOR INSOMNIA      ipratropium (ATROVENT HFA) 17 mcg/actuation inhaler Inhale 2 puffs into the lungs every 6 (six) hours as needed.      letrozole (FEMARA) 2.5 mg Tab       levothyroxine (SYNTHROID) 50 MCG tablet TAKE 1 TABLET(50 MCG) BY MOUTH BEFORE BREAKFAST 90 tablet 3    lisinopriL (PRINIVIL,ZESTRIL) 20 MG tablet Take 1 tablet (20 mg total) by mouth once daily. 30 tablet 2    loperamide (IMODIUM) 2 mg capsule Take 2 mg by mouth 4 (four) times daily as needed for Diarrhea (Per package directions).      losartan (COZAAR) 25 MG tablet Take 1 tablet by mouth once daily.      melatonin (MELATIN) Take by mouth nightly as needed for Insomnia.      metFORMIN (GLUCOPHAGE-XR) 500 MG ER 24hr tablet Take 2 tablets (1,000 mg total) by mouth 2 (two) times daily with meals. 360 tablet 3    metoprolol succinate (TOPROL-XL) 50 MG 24 hr tablet       mirtazapine (REMERON) 30 MG tablet       multivitamin Tab Take 1 tablet by mouth once daily. 30 tablet 3    nabumetone (RELAFEN) 500 MG tablet Take 500 mg by mouth 2 (two) times daily.      ondansetron (ZOFRAN-ODT) 4 MG TbDL Take 1 tablet (4 mg total) by mouth every 6 (six) hours as needed (nausea/vomiting). 12 tablet 0     pantoprazole (PROTONIX) 40 MG tablet Take 40 mg by mouth once daily.      propranoloL (INDERAL) 20 MG tablet Take 20 mg by mouth 2 (two) times daily.      thiamine 100 MG tablet Take 1 tablet (100 mg total) by mouth once daily. 30 tablet 11    traZODone (DESYREL) 300 MG tablet Take 1 tablet by mouth every evening.       Lortab [hydrocodone-acetaminophen] and Promethazine  Social History     Socioeconomic History    Marital status:    Tobacco Use    Smoking status: Every Day     Current packs/day: 0.00     Average packs/day: 0.5 packs/day for 30.0 years (15.0 ttl pk-yrs)     Types: Vaping with nicotine, Cigarettes     Start date: 1991     Last attempt to quit: 2021     Years since quittin.7    Smokeless tobacco: Never    Tobacco comments:     Patient is currently smoking 10 cigarettes a day, declines nicotine patches   Substance and Sexual Activity    Alcohol use: Yes     Comment: vodka daily (half a regular bottle) for 4 days    Drug use: Yes     Types: Marijuana     Comment: gummies    Sexual activity: Yes     Birth control/protection: Surgical     Social Determinants of Health     Financial Resource Strain: Low Risk  (2023)    Overall Financial Resource Strain (CARDIA)     Difficulty of Paying Living Expenses: Not very hard   Food Insecurity: No Food Insecurity (2023)    Hunger Vital Sign     Worried About Running Out of Food in the Last Year: Never true     Ran Out of Food in the Last Year: Never true   Transportation Needs: No Transportation Needs (2023)    PRAPARE - Transportation     Lack of Transportation (Medical): No     Lack of Transportation (Non-Medical): No   Physical Activity: Insufficiently Active (2023)    Exercise Vital Sign     Days of Exercise per Week: 3 days     Minutes of Exercise per Session: 10 min   Stress: No Stress Concern Present (2023)    [employer] [place] [employer] - Occupational Stress Questionnaire     Feeling of Stress : Not at  all   Recent Concern: Stress - Stress Concern Present (4/26/2023)    [place] [employer] - Occupational Stress Questionnaire     Feeling of Stress : To some extent   Social Connections: Unknown (7/18/2023)    Social Connection and Isolation Panel [NHANES]     Frequency of Communication with Friends and Family: Three times a week     Frequency of Social Gatherings with Friends and Family: Three times a week     Active Member of Clubs or Organizations: No     Attends Club or Organization Meetings: Never     Marital Status:    Housing Stability: Unknown (7/18/2023)    Housing Stability Vital Sign     Unable to Pay for Housing in the Last Year: No     Unstable Housing in the Last Year: No                 DATA & INTERVENTIONS:    LABS reviewed:  Labs Reviewed - No data to display    RADIOLOGY reviewed:  Imaging Results    None         MEDICATIONS/FLUIDS:  Medications - No data to display           Evangelina Del Cid MD  11/04/23 1948

## 2023-11-04 NOTE — CONSULTS
[facility] - Telemetry Stepdown  Critical Care Medicine  Consult Note    Patient Name: Earl Abdul  MRN: [id number]  Admission Date: 11/4/2023  Hospital Length of Stay: 0 days  Code Status: Full Code  Attending Physician: Andrew Crow MD   Primary Care Provider: Andrew Rodriguez MD   Principal Problem: Alcohol use disorder, severe, dependence    Inpatient consult to Critical Care Medicine  Consult performed by: Tyler García MD  Consult ordered by: Donny Lee MD  Reason for consult: Admission to MICU for Precedex in setting of EtOH Withdrawl        Subjective:     HPI:  Earl Abdul is a 63 y.o. year old female PMH of depression, ETOH abuse who presents to the ED for depression.  Patient reports daily drinking of 1 bottle of hard liquor, feels depressed as her   left her yesterday.  She called EMS as she wanted to stop drinking and go to rehab.     She denies SI/HI  She reports hx of alcohol withdrawal seizures before  Reports hx of COPD on 2 L oxygen at night  + vomiting recently, denies abdominal pain     Reports fall, unsure when while going up the steps, denies head injury but reports a headache.  Patient gets frustrated and unable to provide details  of the fall or description of the headache.  She reports that she does not want to go to a psychiatric facility, she believes she would be able to go home.  Last alcoholic drink this am, unsure when she started having UE shaking.  Pt became agitated and aggressive in the ED.  Verbal deescalation was ineffective.  Pt restrained with soft restraints and haldol.           Hospital/ICU Course:  11/4/23 Pt became agitated and aggressive in the ED.  Required physical and chemical restraints.  The restraints were removed when the MICU team evaluated the patient on the floor.        Past Medical History:   Diagnosis Date    Anemia     Anemia     Controlled type 2 diabetes mellitus without complication, without long-term current use of insulin  11/30/2021    COPD (chronic obstructive pulmonary disease)     Depression     Diverticulitis     Fatty liver     GERD (gastroesophageal reflux disease)     Hyperlipidemia     Hypertension     Pancreatitis     Peptic ulcer disease     Polysubstance abuse     Posterior reversible encephalopathy syndrome     Sarcoidosis of lung     Sarcoidosis of lung     over 30 yrs ago    Seizures     7/2017    Suicide attempt     Suicide ideation        Past Surgical History:   Procedure Laterality Date    APPENDECTOMY      BILATERAL MASTECTOMY Bilateral 10/29/2020    Procedure: MASTECTOMY, BILATERAL;  Surgeon: Baylee Kevin MD;  Location: [facility] OR [address] [place] [address];  Service: General;  Laterality: Bilateral;    BREAST REVISION SURGERY Bilateral 2/11/2021    Procedure: BREAST REVISION SURGERY;  Surgeon: Scottie Johnson MD;  Location: [facility] OR [address] [place] [address];  Service: Plastics;  Laterality: Bilateral;    COLONOSCOPY N/A 7/28/2017    Procedure: COLONOSCOPY;  Surgeon: Aaron Alvarado MD;  Location: [facility];  Service: Endoscopy;  Laterality: N/A;    ESOPHAGOGASTRODUODENOSCOPY  10/7/2016, 11/6/2014    2016 - gastritis, duodenitis, 2014 erosive gastritis    ESOPHAGOGASTRODUODENOSCOPY N/A 2/11/2020    Procedure: ESOPHAGOGASTRODUODENOSCOPY (EGD);  Surgeon: Fawn Garrido MD;  Location: [facility];  Service: Endoscopy;  Laterality: N/A;    ESOPHAGOGASTRODUODENOSCOPY N/A 4/19/2021    Procedure: EGD (ESOPHAGOGASTRODUODENOSCOPY);  Surgeon: Paramjit Martino MD;  Location: [facility];  Service: Endoscopy;  Laterality: N/A;    FLEXIBLE SIGMOIDOSCOPY  11/06/2014    colitis    HYSTERECTOMY      IMPLANTATION OF PERMANENT SACRAL NERVE STIMULATOR N/A 7/12/2022    Procedure: INSERTION, NEUROSTIMULATOR, PERMANENT, SACRAL;  Surgeon: Juaquin Edwards MD;  Location: [facility] OR [address] [place] [address];  Service: Urology;  Laterality: N/A;  1hr    INJECTION FOR SENTINEL NODE IDENTIFICATION Right 10/29/2020    Procedure: INJECTION, FOR SENTINEL NODE IDENTIFICATION;  Surgeon: Baylee  MANUEL Kevin MD;  Location: [address];  Service: General;  Laterality: Right;    INJECTION OF JOINT Right 10/10/2019    Procedure: Injection, Joint RIGHT ILIOPSOAS BURSA/TENDON INJECTION AND RIGHT GLUTEAL TENDON INJECTION WITH STEROID AND LIDOCAINE;  Surgeon: Guillaume Rico MD;  Location: [facility] [place], operated by [facility];  Service: Pain Management;  Laterality: Right;  NEEDS CONSENT    INSERTION OF BREAST TISSUE EXPANDER Bilateral 10/29/2020    Procedure: INSERTION, TISSUE EXPANDER, BREAST;  Surgeon: Scottie Johnson MD;  Location: [address];  Service: Plastics;  Laterality: Bilateral;  Right breast: 1082 g  Left breast: 1076 g    LIPOSUCTION Bilateral 2021    Procedure: LIPOSUCTION;  Surgeon: Scottie Johnson MD;  Location: [address];  Service: Plastics;  Laterality: Bilateral;    mediastenoscopy      REPLACEMENT OF IMPLANT OF BREAST Bilateral 2021    Procedure: REPLACEMENT, IMPLANT, BREAST;  Surgeon: Scottie Johnson MD;  Location: [address];  Service: Plastics;  Laterality: Bilateral;    SENTINEL LYMPH NODE BIOPSY Right 10/29/2020    Procedure: BIOPSY, LYMPH NODE, SENTINEL;  Surgeon: Baylee Kevin MD;  Location: [address];  Service: General;  Laterality: Right;    TONSILLECTOMY N/A 1970    TUBAL LIGATION         Review of patient's allergies indicates:   Allergen Reactions    Lortab [hydrocodone-acetaminophen] Itching    Promethazine Itching and Other (See Comments)       Family History       Problem Relation (Age of Onset)    Breast cancer Maternal Aunt, Daughter    Colon cancer Maternal Uncle    Diabetes Father, Mother    Heart attack Father    Hypertension Father, Mother          Tobacco Use    Smoking status: Every Day     Current packs/day: 0.00     Average packs/day: 0.5 packs/day for 30.0 years (15.0 ttl pk-yrs)     Types: Vaping with nicotine, Cigarettes     Start date: 1991     Last attempt to quit: 2021     Years since quittin.7    Smokeless tobacco:  Never    Tobacco comments:     Patient is currently smoking 10 cigarettes a day, declines nicotine patches   Substance and Sexual Activity    Alcohol use: Yes     Comment: vodka daily (half a regular bottle) for 4 days    Drug use: Yes     Types: Marijuana     Comment: gummies    Sexual activity: Yes     Birth control/protection: Surgical      Review of Systems  Objective:     Vital Signs (Most Recent):  Temp: 99 °F (37.2 °C) (11/04/23 1740)  Pulse: (!) 122 (11/04/23 1740)  Resp: (!) 24 (11/04/23 1740)  BP: (!) 173/72 (11/04/23 1740)  SpO2: (!) 92 % (11/04/23 1740) Vital Signs (24h Range):  Temp:  [97.7 °F (36.5 °C)-99 °F (37.2 °C)] 99 °F (37.2 °C)  Pulse:  [100-126] 122  Resp:  [16-24] 24  SpO2:  [92 %-98 %] 92 %  BP: (129-173)/(57-81) 173/72   Weight: 87.1 kg (192 lb)  Body mass index is 30.99 kg/m².    No intake or output data in the 24 hours ending 11/04/23 1836       Physical Exam  Vitals reviewed. Exam conducted with a chaperone present.   Constitutional:       General: She is not in acute distress.     Appearance: She is obese. She is not ill-appearing.      Comments: Pt sedated with benzos and haldol   HENT:      Head: Normocephalic and atraumatic.      Nose: Nose normal.   Eyes:      Extraocular Movements: Extraocular movements intact.      Pupils: Pupils are equal, round, and reactive to light.   Cardiovascular:      Rate and Rhythm: Tachycardia present.      Pulses: Normal pulses.      Heart sounds: Normal heart sounds.   Pulmonary:      Effort: Pulmonary effort is normal. No respiratory distress.      Breath sounds: Normal breath sounds. No stridor. No wheezing, rhonchi or rales.   Abdominal:      General: Abdomen is flat. There is no distension.      Palpations: Abdomen is soft.      Tenderness: There is no abdominal tenderness. There is no guarding or rebound.   Musculoskeletal:      Cervical back: Normal range of motion and neck supple.      Comments: Left hand swollen and bruised.  Abrasions and  bruises over UE bilat   Skin:     General: Skin is warm and dry.      Findings: Bruising present.   Neurological:      Mental Status: She is oriented to person, place, and time.   Psychiatric:      Comments: Agitated and restless    CIWA - 16            Vents:     Lines/Drains/Airways       Peripheral Intravenous Line  Duration                  Peripheral IV - Single Lumen 11/04/23 1119 22 G Anterior;Right Hand <1 day                  Significant Labs:    CBC/Anemia Profile:  Recent Labs   Lab 11/04/23  1120 11/04/23  1123 11/04/23  1329   WBC 33.05*  --   --    HGB 11.6*  --   --    HCT 39.4 38 35*     --   --    MCV 88  --   --    RDW 17.2*  --   --    IRON 269*  --   --         Chemistries:  Recent Labs   Lab 11/04/23  1319      K 5.5*      CO2 16*   BUN 31*   CREATININE 0.9   CALCIUM 8.9   ALBUMIN 4.3   PROT 7.4   BILITOT 0.4   ALKPHOS 63   ALT 23   AST 34       All pertinent labs within the past 24 hours have been reviewed.    Significant Imaging: I have reviewed all pertinent imaging results/findings within the past 24 hours.      ABG  Recent Labs   Lab 11/04/23  1123   PH 7.345*   PO2 35*   PCO2 41.9   HCO3 22.9*   BE -3*     Assessment/Plan:     Psychiatric  Alcohol withdrawal  Pt going through withdrawals after having her last drink earlier today.  She has been drinking 1 bottle of hard liquor per day.  Further history was unable to be elicited due to the patient's current condition.      CIWA score currently 16    Recommend:    - CIWA checks Q1h.  If CIWA score improves for two checks the frequency can be dropped to Q2H    - Recommend keeping patient out of physical restraints if possible.  Attempt to deescalate as restraints increase the level of agitation and restlessness of the patient.    - Discontinue the chlordiazepoxide    - Recommend utilizing  the following diazepam taper:     Day 1: Oral, IV: 10 mg every 6 hours   Day 2: Oral, IV: 10 mg every 8 hours   Day 3: Oral, IV: 10 mg  every 12 hours   Day 4: Oral, IV: 10 mg at bedtime, then discontinue diazepam     - If patient develops greater agitation or if a CIWA score results > 20 please call the MICU.    - If CIWA score is consistently < 15 you can switch to lorazepam prn        Critical Care Daily Checklist:    A: Awake: RASS Goal/Actual Goal:    Actual:     B: Spontaneous Breathing Trial Performed?  NA   C: SAT & SBT Coordinated?  NA                      D: Delirium: CAM-ICU     E: Early Mobility Performed? No   F: Feeding Goal:    Status:     Current Diet Order   Procedures    Diet Adult Regular (IDDSI Level 7)      AS: Analgesia/Sedation benzo   T: Thromboembolic Prophylaxis no   H: HOB > 300 Yes   U: Stress Ulcer Prophylaxis (if needed) NA   G: Glucose Control NA   B: Bowel Function NA   I: Indwelling Catheter (Lines & Boudreaux) Necessity PIV   D: De-escalation of Antimicrobials/Pharmacotherapies NA    Plan for the day/ETD Taper benzo    Code Status:  Family/Goals of Care: Full Code       Critical secondary to Patient has a condition that poses threat to life and bodily function: alcohol withdrawal.       Critical care was time spent personally by me on the following activities: development of treatment plan with patient or surrogate and bedside caregivers, discussions with consultants, evaluation of patient's response to treatment, examination of patient, ordering and performing treatments and interventions, ordering and review of laboratory studies, ordering and review of radiographic studies, pulse oximetry, re-evaluation of patient's condition. This critical care time did not overlap with that of any other provider or involve time for any procedures.    Thank you for your consult. I will sign off. Please contact us if you have any additional questions.     Harinder Coates MD  Critical Care Medicine  [facility] - Telemetry Stepdown

## 2023-11-04 NOTE — HOSPITAL COURSE
Pt became agitated and aggressive in the ED. Required physical and chemical restraints. The restraints were removed when the MICU team evaluated the patient on the floor. Pt has been drowsy but not agitated while in the ICU, can step down today.

## 2023-11-04 NOTE — H&P
[facility] - Emergency Dept  Hospital Medicine  History & Physical    Patient Name: Earl Abdul  MRN: [id number]  Patient Class: OP- Observation  Admission Date: 11/4/2023  Attending Physician: Andrew Crow MD   Primary Care Provider: Andrew Rodriguez MD         Patient information was obtained from patient, past medical records, and ER records.     Subjective:     Principal Problem:Alcohol use disorder, severe, dependence    Chief Complaint:   Chief Complaint   Patient presents with    Depression     States  left her 4 d ago, increased depression. Alcohol dependent and has ETOH on board today. Hasn't been compliant with psych meds. Denies SI/HI        HPI: 63-year-old female with history of alcohol use disorder with hx of prior seizures, alcohol withdrawal, depression with suicide attempt 2017, non-insulin dependent DM, COPD, GERD, fibromyalgia, sarcoidosis, breast Ca, CLL (no current treatment), HTN, HLD, hypothyroidism presenting to ED     She presents today acutely intoxicated , last drink, vodka this morning. She states she does not drink every day but that she has been binging since her  left her. She Denies any other substances. Denies SI HI.     She is not aware to the gravity of her situation and she intermittently says she wants to go and then states she will stay throughout the interview    She has significant signs of active withdrawal including significant psychomotor agitation, tachycardia and hypertension    Labs on admission significant for a significant Leukocytosis.     CXR WNL    Past Medical History:   Diagnosis Date    Anemia     Anemia     Controlled type 2 diabetes mellitus without complication, without long-term current use of insulin 11/30/2021    COPD (chronic obstructive pulmonary disease)     Depression     Diverticulitis     Fatty liver     GERD (gastroesophageal reflux disease)     Hyperlipidemia     Hypertension     Pancreatitis     Peptic ulcer disease      Polysubstance abuse     Posterior reversible encephalopathy syndrome     Sarcoidosis of lung     Sarcoidosis of lung     over 30 yrs ago    Seizures     7/2017    Suicide attempt     Suicide ideation        Past Surgical History:   Procedure Laterality Date    APPENDECTOMY      BILATERAL MASTECTOMY Bilateral 10/29/2020    Procedure: MASTECTOMY, BILATERAL;  Surgeon: Baylee Kevin MD;  Location: [address];  Service: General;  Laterality: Bilateral;    BREAST REVISION SURGERY Bilateral 2/11/2021    Procedure: BREAST REVISION SURGERY;  Surgeon: Scottie Johnson MD;  Location: [address];  Service: Plastics;  Laterality: Bilateral;    COLONOSCOPY N/A 7/28/2017    Procedure: COLONOSCOPY;  Surgeon: Aaron Alvarado MD;  Location: [facility];  Service: Endoscopy;  Laterality: N/A;    ESOPHAGOGASTRODUODENOSCOPY  10/7/2016, 11/6/2014    2016 - gastritis, duodenitis, 2014 erosive gastritis    ESOPHAGOGASTRODUODENOSCOPY N/A 2/11/2020    Procedure: ESOPHAGOGASTRODUODENOSCOPY (EGD);  Surgeon: Fawn Garrido MD;  Location: [facility];  Service: Endoscopy;  Laterality: N/A;    ESOPHAGOGASTRODUODENOSCOPY N/A 4/19/2021    Procedure: EGD (ESOPHAGOGASTRODUODENOSCOPY);  Surgeon: Paramjit Martino MD;  Location: [facility];  Service: Endoscopy;  Laterality: N/A;    FLEXIBLE SIGMOIDOSCOPY  11/06/2014    colitis    HYSTERECTOMY      IMPLANTATION OF PERMANENT SACRAL NERVE STIMULATOR N/A 7/12/2022    Procedure: INSERTION, NEUROSTIMULATOR, PERMANENT, SACRAL;  Surgeon: Juaquin Edwards MD;  Location: [address];  Service: Urology;  Laterality: N/A;  1hr    INJECTION FOR SENTINEL NODE IDENTIFICATION Right 10/29/2020    Procedure: INJECTION, FOR SENTINEL NODE IDENTIFICATION;  Surgeon: Baylee Kevin MD;  Location: [address];  Service: General;  Laterality: Right;    INJECTION OF JOINT Right 10/10/2019    Procedure: Injection, Joint RIGHT ILIOPSOAS BURSA/TENDON INJECTION AND RIGHT GLUTEAL TENDON INJECTION WITH  STEROID AND LIDOCAINE;  Surgeon: Guillaume Rico MD;  Location: [facility];  Service: Pain Management;  Laterality: Right;  NEEDS CONSENT    INSERTION OF BREAST TISSUE EXPANDER Bilateral 10/29/2020    Procedure: INSERTION, TISSUE EXPANDER, BREAST;  Surgeon: Scottie Johnson MD;  Location: [facility] OR [address] [place] [address];  Service: Plastics;  Laterality: Bilateral;  Right breast: 1082 g  Left breast: 1076 g    LIPOSUCTION Bilateral 2/11/2021    Procedure: LIPOSUCTION;  Surgeon: Scottie Johnson MD;  Location: [address];  Service: Plastics;  Laterality: Bilateral;    mediastenoscopy      REPLACEMENT OF IMPLANT OF BREAST Bilateral 2/11/2021    Procedure: REPLACEMENT, IMPLANT, BREAST;  Surgeon: Scottie Johnson MD;  Location: [address];  Service: Plastics;  Laterality: Bilateral;    SENTINEL LYMPH NODE BIOPSY Right 10/29/2020    Procedure: BIOPSY, LYMPH NODE, SENTINEL;  Surgeon: Baylee Kevin MD;  Location: [address];  Service: General;  Laterality: Right;    TONSILLECTOMY N/A 1970    TUBAL LIGATION         Review of patient's allergies indicates:   Allergen Reactions    Lortab [hydrocodone-acetaminophen] Itching    Promethazine Itching and Other (See Comments)       Current Facility-Administered Medications on File Prior to Encounter   Medication    albuterol sulfate nebulizer solution 2.5 mg     Current Outpatient Medications on File Prior to Encounter   Medication Sig    acetaminophen (TYLENOL) 500 MG tablet Take 500-1,500 mg by mouth daily as needed for Pain.    albuterol (PROVENTIL/VENTOLIN HFA) 90 mcg/actuation inhaler     anastrozole (ARIMIDEX) 1 mg Tab     ARIPiprazole (ABILIFY) 10 MG Tab Take 1 tablet by mouth once daily.    ARIPiprazole (ABILIFY) 2 MG Tab Take 1 tablet by mouth once daily.    ARIPiprazole (ABILIFY) 5 MG Tab Take 1 tablet by mouth every morning.    atorvastatin (LIPITOR) 10 MG tablet Take 1 tablet by mouth once daily.    busPIRone (BUSPAR) 15 MG tablet      butalbital-acetaminophen-caffeine -40 mg (FIORICET, ESGIC) -40 mg per tablet Take 1 tablet by mouth every 4 (four) hours as needed.    cholecalciferol, vitamin D3, (VITAMIN D3) 50 mcg (2,000 unit) Cap capsule TAKE 1 CAPSULE BY MOUTH ONCE A DAY IN THE MORNING    cloNIDine (CATAPRES) 0.1 MG tablet Take by mouth.    diazePAM (VALIUM) 5 MG tablet Take 2 tablets (10 mg total) by mouth every 8 (eight) hours for 3 days, THEN 2 tablets (10 mg total) every 12 (twelve) hours for 3 days, THEN 1 tablet (5 mg total) every 12 (twelve) hours for 3 days, THEN 1 tablet (5 mg total) once daily for 3 days, THEN 0.5 tablets (2.5 mg total) once daily for 3 days.    dicyclomine (BENTYL) 20 mg tablet Take 20 mg by mouth 4 (four) times daily.    doxepin (SINEQUAN) 150 MG Cap Take 150 mg by mouth every evening.    DULoxetine (CYMBALTA) 60 MG capsule Take 60 mg by mouth.    EScitalopram oxalate (LEXAPRO) 10 MG tablet Take 10 mg by mouth once daily.    FLUoxetine 20 MG capsule Take 3 capsules by mouth once daily.    fluticasone furoate-vilanteroL (BREO ELLIPTA) 100-25 mcg/dose diskus inhaler Inhale 1 puff into the lungs once daily. Controller    folic acid (FOLVITE) 1 MG tablet Take 1 tablet (1 mg total) by mouth once daily.    gabapentin (NEURONTIN) 600 MG tablet Take 1 tablet (600 mg total) by mouth 3 (three) times daily. for 10 days    hydroCHLOROthiazide (HYDRODIURIL) 25 MG tablet     hydrOXYzine pamoate (VISTARIL) 50 MG Cap TAKE ONE CAPSULE BY MOUTH EVERY 6 HOURS AS NEEDED FOR ANXIETY AND/OR EVERY NIGHT AT BEDTIME AS NEEDED FOR INSOMNIA    ipratropium (ATROVENT HFA) 17 mcg/actuation inhaler Inhale 2 puffs into the lungs every 6 (six) hours as needed.    letrozole (FEMARA) 2.5 mg Tab     levothyroxine (SYNTHROID) 50 MCG tablet TAKE 1 TABLET(50 MCG) BY MOUTH BEFORE BREAKFAST    lisinopriL (PRINIVIL,ZESTRIL) 20 MG tablet Take 1 tablet (20 mg total) by mouth once daily.    loperamide (IMODIUM) 2 mg capsule Take 2 mg by mouth 4  (four) times daily as needed for Diarrhea (Per package directions).    losartan (COZAAR) 25 MG tablet Take 1 tablet by mouth once daily.    melatonin (MELATIN) Take by mouth nightly as needed for Insomnia.    metFORMIN (GLUCOPHAGE-XR) 500 MG ER 24hr tablet Take 2 tablets (1,000 mg total) by mouth 2 (two) times daily with meals.    metoprolol succinate (TOPROL-XL) 50 MG 24 hr tablet     mirtazapine (REMERON) 30 MG tablet     multivitamin Tab Take 1 tablet by mouth once daily.    nabumetone (RELAFEN) 500 MG tablet Take 500 mg by mouth 2 (two) times daily.    ondansetron (ZOFRAN-ODT) 4 MG TbDL Take 1 tablet (4 mg total) by mouth every 6 (six) hours as needed (nausea/vomiting).    pantoprazole (PROTONIX) 40 MG tablet Take 40 mg by mouth once daily.    propranoloL (INDERAL) 20 MG tablet Take 20 mg by mouth 2 (two) times daily.    thiamine 100 MG tablet Take 1 tablet (100 mg total) by mouth once daily.    traZODone (DESYREL) 300 MG tablet Take 1 tablet by mouth every evening.     Family History       Problem Relation (Age of Onset)    Breast cancer Maternal Aunt, Daughter    Colon cancer Maternal Uncle    Diabetes Father, Mother    Heart attack Father    Hypertension Father, Mother          Tobacco Use    Smoking status: Every Day     Current packs/day: 0.00     Average packs/day: 0.5 packs/day for 30.0 years (15.0 ttl pk-yrs)     Types: Vaping with nicotine, Cigarettes     Start date: 1991     Last attempt to quit: 2021     Years since quittin.7    Smokeless tobacco: Never    Tobacco comments:     Patient is currently smoking 10 cigarettes a day, declines nicotine patches   Substance and Sexual Activity    Alcohol use: Yes     Comment: vodka daily (half a regular bottle) for 4 days    Drug use: Yes     Types: Marijuana     Comment: gummies    Sexual activity: Yes     Birth control/protection: Surgical     Review of Systems   Unable to perform ROS: Mental status change   Psychiatric/Behavioral:  Positive for  agitation, confusion and decreased concentration. The patient is hyperactive.      Objective:     Vital Signs (Most Recent):  Temp: 98.5 °F (36.9 °C) (11/04/23 1330)  Pulse: (!) 126 (11/04/23 1330)  Resp: 20 (11/04/23 1330)  BP: (!) 159/81 (11/04/23 1330)  SpO2: (!) 93 % (11/04/23 1330) Vital Signs (24h Range):  Temp:  [97.7 °F (36.5 °C)-98.5 °F (36.9 °C)] 98.5 °F (36.9 °C)  Pulse:  [100-126] 126  Resp:  [16-20] 20  SpO2:  [92 %-98 %] 93 %  BP: (129-159)/(57-81) 159/81     Weight: 87.1 kg (192 lb)  Body mass index is 30.99 kg/m².     Physical Exam  Vitals and nursing note reviewed.   Constitutional:       General: She is in acute distress.      Appearance: She is obese. She is ill-appearing and diaphoretic.   HENT:      Head: Normocephalic.   Eyes:      Conjunctiva/sclera: Conjunctivae normal.   Cardiovascular:      Rate and Rhythm: Tachycardia present.   Pulmonary:      Effort: Pulmonary effort is normal. No respiratory distress.      Breath sounds: Normal breath sounds.   Abdominal:      General: Abdomen is flat. There is no distension.      Tenderness: There is no abdominal tenderness.   Skin:     Findings: Bruising, lesion and rash present.      Comments: Abrasions throughout skin of arms   Psychiatric:      Comments: Very agitated. Alert but not linear                 Significant Labs: All pertinent labs within the past 24 hours have been reviewed.    Significant Imaging: I have reviewed all pertinent imaging results/findings within the past 24 hours.  Assessment/Plan:     * Alcohol use disorder, severe, dependence  The patient is presenting with the signs and syptoms of acute alcohol intoxication and subsequent withdrawal    CIWA protocol  Thiamine  Folate  Initiating Valium Taper given history  Consulting psych given history  Monitor vitals   Telemetry  Attempt Naltrexone initiation at discharge  Alcohol cessation counseling        CLL (chronic lymphocytic leukemia)  See Notes for history    Given she is a  known CLL patient presenting with a significant leukocytosis which is greatly changed even since her visit to Hematology in October I will consult Hematology    -Peripheral smear  -Lactate  -Coags    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    COPD (chronic obstructive pulmonary disease)  Home meds  Does not appear to be in acute respiratory distress    Malignant neoplasm of central portion of right breast in female, estrogen receptor positive  See oncology notes for history      Depression with anxiety  The patient has a significant home medication list and I am unable to verify them at this time  Denying SI HI but lacks insight into the severity of her disease  Psychiatry consult for recommendations with regards to initiation of medication given significant dosing and redundancies on med list as well as now on benzos for withdrawal        History of substance abuse    See alcohol withdrawal    Sarcoidosis  Daily labs      Tobacco abuse  Smoking cessation when able      Fibromyalgia  Pain control as needed      Essential hypertension  Chronic, uncontrolled. Latest blood pressure and vitals reviewed-     Temp:  [97.7 °F (36.5 °C)-98.5 °F (36.9 °C)]   Pulse:  [100-126]   Resp:  [16-20]   BP: (129-159)/(57-81)   SpO2:  [92 %-98 %] .   Home meds for hypertension were reviewed and noted below.   Hypertension Medications               cloNIDine (CATAPRES) 0.1 MG tablet Take by mouth.    hydroCHLOROthiazide (HYDRODIURIL) 25 MG tablet     lisinopriL (PRINIVIL,ZESTRIL) 20 MG tablet Take 1 tablet (20 mg total) by mouth once daily.    losartan (COZAAR) 25 MG tablet Take 1 tablet by mouth once daily.    metoprolol succinate (TOPROL-XL) 50 MG 24 hr tablet     propranoloL (INDERAL) 20 MG tablet Take 20 mg by mouth 2 (two) times daily.             While in the hospital, will manage blood pressure as follows; Continue home antihypertensive regimen  Home meds      VTE Risk Mitigation (From admission, onward)           Ordered     enoxaparin injection 40 mg  Daily         11/04/23 1358     IP VTE HIGH RISK PATIENT  Once         11/04/23 1358     Place sequential compression device  Until discontinued         11/04/23 1358                           On 11/04/2023, patient should be placed in hospital observation services under my care in collaboration with Dr. Crow.        Donny Lee MD  Department of Hospital Medicine  [facility] - Emergency Dept

## 2023-11-04 NOTE — HPI
63-year-old female with history of alcohol use disorder with hx of prior seizures, alcohol withdrawal, depression with suicide attempt 2017, non-insulin dependent DM, COPD, GERD, fibromyalgia, sarcoidosis, breast Ca, CLL (no current treatment), HTN, HLD, hypothyroidism presenting to ED     She presents today acutely intoxicated , last drink, vodka this morning. She states she does not drink every day but that she has been binging since her  left her. She Denies any other substances. Denies SI HI.     She is not aware to the gravity of her situation and she intermittently says she wants to go and then states she will stay throughout the interview    She has significant signs of active withdrawal including significant psychomotor agitation, tachycardia and hypertension    Labs on admission significant for a significant Leukocytosis.     CXR WNL

## 2023-11-04 NOTE — ASSESSMENT & PLAN NOTE
Pt going through withdrawals after having her last drink earlier today.  She has been drinking 1 bottle of hard liquor per day.  Further history was unable to be elicited due to the patient's current condition.      CIWA score currently 16    Recommend:    - CIWA checks Q1h.  If CIWA score improves for two checks the frequency can be dropped to Q2H    - Recommend keeping patient out of physical restraints if possible.  Attempt to deescalate as restraints increase the level of agitation and restlessness of the patient.    - Discontinue the chlordiazepoxide    - Recommend utilizing  the following diazepam taper:     Day 1: Oral, IV: 10 mg every 6 hours   Day 2: Oral, IV: 10 mg every 8 hours   Day 3: Oral, IV: 10 mg every 12 hours   Day 4: Oral, IV: 10 mg at bedtime, then discontinue diazepam     - If patient develops greater agitation or if a CIWA score results > 20 please call the MICU.    - If CIWA score is consistently < 15 you can switch to lorazepam prn

## 2023-11-04 NOTE — NURSING
Pt arrived to room 8086 in restraints. Pt disoriented to time upon arrival to room. Pt HR and BP elevated upon arrival. Notified Donny Lee MD that pt's BP and HR are elevated. Jesus BECERRA stated to assess pt's CIWA score. Pt CIWA 17 upon assessment. Notified Jesus BECERRA of pt's CIWA score. Jesus BECERRA stated to administer PRN ativan. Jesus BECERRA ordered PRN ativan and consulted critical care.

## 2023-11-04 NOTE — ED NOTES
"Pt removed all monitoring and stating she wanted to leave. MD brought to bedside and pt redirected. Now pt stating "ill stay". No code watch needed per Dr. Del Cid. Pt is intoxicated but ambulatory and oriented x3. Sitter requested by this RN for safety concerns - falls, etc. Angelina, PCT now in direct of pt.   "

## 2023-11-04 NOTE — ASSESSMENT & PLAN NOTE
The patient is presenting with the signs and syptoms of acute alcohol intoxication and subsequent withdrawal    CIWA protocol  Thiamine  Folate  Initiating Valium Taper given history  Consulting psych given history  Monitor vitals   Telemetry  Attempt Naltrexone initiation at discharge  Alcohol cessation counseling

## 2023-11-04 NOTE — PROGRESS NOTES
Pt belongings include: shirt, pants, and sandals   Pt belongings in safe bag #[id number] include: ring, watch, and cell phone

## 2023-11-05 LAB
ALBUMIN SERPL BCP-MCNC: 4 G/DL (ref 3.5–5.2)
ALBUMIN SERPL BCP-MCNC: 4.1 G/DL (ref 3.5–5.2)
ALP SERPL-CCNC: 57 U/L (ref 55–135)
ALP SERPL-CCNC: 58 U/L (ref 55–135)
ALT SERPL W/O P-5'-P-CCNC: 23 U/L (ref 10–44)
ALT SERPL W/O P-5'-P-CCNC: 25 U/L (ref 10–44)
ANION GAP SERPL CALC-SCNC: 15 MMOL/L (ref 8–16)
ANION GAP SERPL CALC-SCNC: 15 MMOL/L (ref 8–16)
ANISOCYTOSIS BLD QL SMEAR: SLIGHT
AST SERPL-CCNC: 34 U/L (ref 10–40)
AST SERPL-CCNC: 37 U/L (ref 10–40)
BASOPHILS # BLD AUTO: 0.02 K/UL (ref 0–0.2)
BASOPHILS NFR BLD: 0.2 % (ref 0–1.9)
BILIRUB SERPL-MCNC: 0.7 MG/DL (ref 0.1–1)
BILIRUB SERPL-MCNC: 0.8 MG/DL (ref 0.1–1)
BUN SERPL-MCNC: 15 MG/DL (ref 8–23)
BUN SERPL-MCNC: 17 MG/DL (ref 8–23)
CALCIUM SERPL-MCNC: 8.9 MG/DL (ref 8.7–10.5)
CALCIUM SERPL-MCNC: 9.3 MG/DL (ref 8.7–10.5)
CHLORIDE SERPL-SCNC: 100 MMOL/L (ref 95–110)
CHLORIDE SERPL-SCNC: 102 MMOL/L (ref 95–110)
CO2 SERPL-SCNC: 22 MMOL/L (ref 23–29)
CO2 SERPL-SCNC: 25 MMOL/L (ref 23–29)
CREAT SERPL-MCNC: 0.8 MG/DL (ref 0.5–1.4)
CREAT SERPL-MCNC: 0.8 MG/DL (ref 0.5–1.4)
DIFFERENTIAL METHOD: ABNORMAL
EOSINOPHIL # BLD AUTO: 0.1 K/UL (ref 0–0.5)
EOSINOPHIL NFR BLD: 0.6 % (ref 0–8)
ERYTHROCYTE [DISTWIDTH] IN BLOOD BY AUTOMATED COUNT: 16.3 % (ref 11.5–14.5)
EST. GFR  (NO RACE VARIABLE): >60 ML/MIN/1.73 M^2
EST. GFR  (NO RACE VARIABLE): >60 ML/MIN/1.73 M^2
GLUCOSE SERPL-MCNC: 82 MG/DL (ref 70–110)
GLUCOSE SERPL-MCNC: 88 MG/DL (ref 70–110)
HCT VFR BLD AUTO: 34.1 % (ref 37–48.5)
HGB BLD-MCNC: 10.4 G/DL (ref 12–16)
IMM GRANULOCYTES # BLD AUTO: 0.02 K/UL (ref 0–0.04)
IMM GRANULOCYTES NFR BLD AUTO: 0.2 % (ref 0–0.5)
LYMPHOCYTES # BLD AUTO: 6.3 K/UL (ref 1–4.8)
LYMPHOCYTES NFR BLD: 75.8 % (ref 18–48)
MAGNESIUM SERPL-MCNC: 2.2 MG/DL (ref 1.6–2.6)
MCH RBC QN AUTO: 26.3 PG (ref 27–31)
MCHC RBC AUTO-ENTMCNC: 30.5 G/DL (ref 32–36)
MCV RBC AUTO: 86 FL (ref 82–98)
MONOCYTES # BLD AUTO: 0.5 K/UL (ref 0.3–1)
MONOCYTES NFR BLD: 5.5 % (ref 4–15)
NEUTROPHILS # BLD AUTO: 1.5 K/UL (ref 1.8–7.7)
NEUTROPHILS NFR BLD: 17.7 % (ref 38–73)
NRBC BLD-RTO: 0 /100 WBC
OVALOCYTES BLD QL SMEAR: ABNORMAL
PHOSPHATE SERPL-MCNC: 2 MG/DL (ref 2.7–4.5)
PLATELET # BLD AUTO: 105 K/UL (ref 150–450)
PMV BLD AUTO: 10.1 FL (ref 9.2–12.9)
POCT GLUCOSE: 168 MG/DL (ref 70–110)
POIKILOCYTOSIS BLD QL SMEAR: SLIGHT
POLYCHROMASIA BLD QL SMEAR: ABNORMAL
POTASSIUM SERPL-SCNC: 4 MMOL/L (ref 3.5–5.1)
POTASSIUM SERPL-SCNC: 4.3 MMOL/L (ref 3.5–5.1)
PROT SERPL-MCNC: 6.7 G/DL (ref 6–8.4)
PROT SERPL-MCNC: 7 G/DL (ref 6–8.4)
RBC # BLD AUTO: 3.96 M/UL (ref 4–5.4)
SMUDGE CELLS BLD QL SMEAR: PRESENT
SODIUM SERPL-SCNC: 139 MMOL/L (ref 136–145)
SODIUM SERPL-SCNC: 140 MMOL/L (ref 136–145)
WBC # BLD AUTO: 8.32 K/UL (ref 3.9–12.7)

## 2023-11-05 PROCEDURE — 96365 THER/PROPH/DIAG IV INF INIT: CPT | Mod: 59

## 2023-11-05 PROCEDURE — 84100 ASSAY OF PHOSPHORUS: CPT | Performed by: STUDENT IN AN ORGANIZED HEALTH CARE EDUCATION/TRAINING PROGRAM

## 2023-11-05 PROCEDURE — 99291 CRITICAL CARE FIRST HOUR: CPT | Mod: ,,, | Performed by: STUDENT IN AN ORGANIZED HEALTH CARE EDUCATION/TRAINING PROGRAM

## 2023-11-05 PROCEDURE — 99291 PR CRITICAL CARE, E/M 30-74 MINUTES: ICD-10-PCS | Mod: ,,, | Performed by: STUDENT IN AN ORGANIZED HEALTH CARE EDUCATION/TRAINING PROGRAM

## 2023-11-05 PROCEDURE — G0378 HOSPITAL OBSERVATION PER HR: HCPCS

## 2023-11-05 PROCEDURE — 90792 PSYCH DIAG EVAL W/MED SRVCS: CPT | Mod: ,,, | Performed by: PSYCHIATRY & NEUROLOGY

## 2023-11-05 PROCEDURE — 27000221 HC OXYGEN, UP TO 24 HOURS

## 2023-11-05 PROCEDURE — 63600175 PHARM REV CODE 636 W HCPCS: Performed by: PHYSICIAN ASSISTANT

## 2023-11-05 PROCEDURE — 80053 COMPREHEN METABOLIC PANEL: CPT | Mod: 91 | Performed by: STUDENT IN AN ORGANIZED HEALTH CARE EDUCATION/TRAINING PROGRAM

## 2023-11-05 PROCEDURE — 80053 COMPREHEN METABOLIC PANEL: CPT | Performed by: STUDENT IN AN ORGANIZED HEALTH CARE EDUCATION/TRAINING PROGRAM

## 2023-11-05 PROCEDURE — 25000003 PHARM REV CODE 250: Performed by: STUDENT IN AN ORGANIZED HEALTH CARE EDUCATION/TRAINING PROGRAM

## 2023-11-05 PROCEDURE — 96376 TX/PRO/DX INJ SAME DRUG ADON: CPT

## 2023-11-05 PROCEDURE — 83735 ASSAY OF MAGNESIUM: CPT | Performed by: STUDENT IN AN ORGANIZED HEALTH CARE EDUCATION/TRAINING PROGRAM

## 2023-11-05 PROCEDURE — 99900035 HC TECH TIME PER 15 MIN (STAT)

## 2023-11-05 PROCEDURE — 90792 PR PSYCHIATRIC DIAGNOSTIC EVALUATION W/MEDICAL SERVICES: ICD-10-PCS | Mod: ,,, | Performed by: PSYCHIATRY & NEUROLOGY

## 2023-11-05 PROCEDURE — 25000003 PHARM REV CODE 250

## 2023-11-05 PROCEDURE — 63600175 PHARM REV CODE 636 W HCPCS: Performed by: STUDENT IN AN ORGANIZED HEALTH CARE EDUCATION/TRAINING PROGRAM

## 2023-11-05 PROCEDURE — 25000242 PHARM REV CODE 250 ALT 637 W/ HCPCS: Performed by: PHYSICIAN ASSISTANT

## 2023-11-05 PROCEDURE — 85025 COMPLETE CBC W/AUTO DIFF WBC: CPT | Performed by: STUDENT IN AN ORGANIZED HEALTH CARE EDUCATION/TRAINING PROGRAM

## 2023-11-05 PROCEDURE — 94761 N-INVAS EAR/PLS OXIMETRY MLT: CPT

## 2023-11-05 PROCEDURE — 96372 THER/PROPH/DIAG INJ SC/IM: CPT | Performed by: STUDENT IN AN ORGANIZED HEALTH CARE EDUCATION/TRAINING PROGRAM

## 2023-11-05 PROCEDURE — 94640 AIRWAY INHALATION TREATMENT: CPT

## 2023-11-05 PROCEDURE — 96375 TX/PRO/DX INJ NEW DRUG ADDON: CPT

## 2023-11-05 RX ORDER — ACETAMINOPHEN 500 MG
1000 TABLET ORAL EVERY 8 HOURS PRN
Status: DISCONTINUED | OUTPATIENT
Start: 2023-11-06 | End: 2023-11-06 | Stop reason: HOSPADM

## 2023-11-05 RX ORDER — SODIUM,POTASSIUM PHOSPHATES 280-250MG
2 POWDER IN PACKET (EA) ORAL EVERY 4 HOURS
Status: COMPLETED | OUTPATIENT
Start: 2023-11-05 | End: 2023-11-05

## 2023-11-05 RX ADMIN — ENOXAPARIN SODIUM 40 MG: 40 INJECTION SUBCUTANEOUS at 06:11

## 2023-11-05 RX ADMIN — LORAZEPAM 2 MG: 2 INJECTION INTRAMUSCULAR; INTRAVENOUS at 01:11

## 2023-11-05 RX ADMIN — LOSARTAN POTASSIUM 25 MG: 25 TABLET, FILM COATED ORAL at 11:11

## 2023-11-05 RX ADMIN — HYDROCHLOROTHIAZIDE 25 MG: 25 TABLET ORAL at 11:11

## 2023-11-05 RX ADMIN — DIAZEPAM 10 MG: 10 INJECTION, SOLUTION INTRAMUSCULAR; INTRAVENOUS at 06:11

## 2023-11-05 RX ADMIN — IPRATROPIUM BROMIDE AND ALBUTEROL SULFATE 3 ML: .5; 3 SOLUTION RESPIRATORY (INHALATION) at 12:11

## 2023-11-05 RX ADMIN — THIAMINE HYDROCHLORIDE 250 MG: 100 INJECTION, SOLUTION INTRAMUSCULAR; INTRAVENOUS at 11:11

## 2023-11-05 RX ADMIN — THERA TABS 1 TABLET: TAB at 11:11

## 2023-11-05 RX ADMIN — DIAZEPAM 10 MG: 10 INJECTION, SOLUTION INTRAMUSCULAR; INTRAVENOUS at 10:11

## 2023-11-05 RX ADMIN — ESCITALOPRAM OXALATE 10 MG: 5 TABLET, FILM COATED ORAL at 11:11

## 2023-11-05 RX ADMIN — PANTOPRAZOLE SODIUM 40 MG: 40 TABLET, DELAYED RELEASE ORAL at 11:11

## 2023-11-05 RX ADMIN — LEVOTHYROXINE SODIUM 50 MCG: 50 TABLET ORAL at 06:11

## 2023-11-05 RX ADMIN — FOLIC ACID 1 MG: 5 INJECTION, SOLUTION INTRAMUSCULAR; INTRAVENOUS; SUBCUTANEOUS at 11:11

## 2023-11-05 RX ADMIN — POTASSIUM & SODIUM PHOSPHATES POWDER PACK 280-160-250 MG 2 PACKET: 280-160-250 PACK at 02:11

## 2023-11-05 RX ADMIN — POTASSIUM & SODIUM PHOSPHATES POWDER PACK 280-160-250 MG 2 PACKET: 280-160-250 PACK at 11:11

## 2023-11-05 RX ADMIN — IPRATROPIUM BROMIDE AND ALBUTEROL SULFATE 3 ML: .5; 3 SOLUTION RESPIRATORY (INHALATION) at 08:11

## 2023-11-05 RX ADMIN — POTASSIUM & SODIUM PHOSPHATES POWDER PACK 280-160-250 MG 2 PACKET: 280-160-250 PACK at 06:11

## 2023-11-05 RX ADMIN — ARIPIPRAZOLE 10 MG: 5 TABLET ORAL at 11:11

## 2023-11-05 RX ADMIN — TRAZODONE HYDROCHLORIDE 150 MG: 50 TABLET ORAL at 10:11

## 2023-11-05 RX ADMIN — ATORVASTATIN CALCIUM 10 MG: 10 TABLET, FILM COATED ORAL at 11:11

## 2023-11-05 RX ADMIN — DIAZEPAM 10 MG: 10 INJECTION, SOLUTION INTRAMUSCULAR; INTRAVENOUS at 02:11

## 2023-11-05 RX ADMIN — IPRATROPIUM BROMIDE AND ALBUTEROL SULFATE 3 ML: .5; 3 SOLUTION RESPIRATORY (INHALATION) at 07:11

## 2023-11-05 NOTE — RESIDENT HANDOFF
Handoff     [place] [facility] [place] 2 Room Number: 6079/6079 A     Patient Name: Earl Abdul MRN: [id number]     Date of Birth: [id number] Allergies: Lortab [hydrocodone-acetaminophen] and Promethazine     Age: 63 y.o. Admit Date: 11/4/2023     Sex: female  BMI: Body mass index is 30.99 kg/m².     Code Status: Full Code        Illness Level (current clinical status): Watcher - No    Reason for Admission: Alcohol use disorder, severe, dependence    Brief HPI (pertinent PMH and diagnosis or differential diagnosis): Pt is a 64 yo woman w/ PMH of depression, EtOH abuse, DM, COPD (on 2L O2 at home), HTN, HLD, and hypothyroidism who presented for depression. She reported that her  had left hear earlier that day and that she was wanting to stop drinking and go to rehab. Denied SI/HI. Last alcoholic drink was the morning of her presentation to ED. She became agitated and aggressive in the ED requiring 5mg ativan and 5mg haldol. She was admitted to hospital medicine and started on chlordiazepoxide taper. Her agitation worsened requiring droperidol, ziprasidone, and benadryl. She was stepped up to MICU for further monitoring of her agitation in the context of alcohol withdrawal.     Procedure Date: n/a    Hospital Course (updated, brief assessment by system or problem, significant events): While in the ICU, she has been calm and HDS. Started on scheduled valium and CIWA protocol.    Tasks (specific, using if-then statements): If pt becomes agitated, give ativan. If withdrawal symptoms worsen, proceed per CIWA protocol.    Contingency Plan (special circumstances anticipated and plan): If pt is having increasing medication requirements to control agitation or withdrawal sx, consult MICU.    Estimated Discharge Date: Defer to hospital medicine team    Discharge Disposition: Home or Self Care    Mentored By: Dr. Serrano

## 2023-11-05 NOTE — CARE UPDATE
RAPID RESPONSE NURSE CHART REVIEW        Chart Reviewed: 11/04/2023, 8:58 PM    MRN: [id number]  Bed: 8012/8086 A    Dx: Alcohol use disorder, severe, dependence    Earl Abdul has a past medical history of Anemia, Anemia, Controlled type 2 diabetes mellitus without complication, without long-term current use of insulin, COPD (chronic obstructive pulmonary disease), Depression, Diverticulitis, Fatty liver, GERD (gastroesophageal reflux disease), Hyperlipidemia, Hypertension, Pancreatitis, Peptic ulcer disease, Polysubstance abuse, Posterior reversible encephalopathy syndrome, Sarcoidosis of lung, Sarcoidosis of lung, Seizures, Suicide attempt, and Suicide ideation.    Last VS: BP (!) 160/70   Pulse 108   Temp 99 °F (37.2 °C) (Oral)   Resp 19   Wt 87.1 kg (192 lb)   SpO2 (!) 94%   BMI 30.99 kg/m²     24H Vital Sign Range:  Temp:  [97.7 °F (36.5 °C)-99 °F (37.2 °C)]   Pulse:  [100-126]   Resp:  [16-31]   BP: (129-176)/(57-81)   SpO2:  [92 %-98 %]     Level of Consciousness (AVPU): alert    Recent Labs     11/04/23  1120 11/04/23  1123 11/04/23  1329   WBC 33.05*  --   --    HGB 11.6*  --   --    HCT 39.4 38 35*     --   --        Recent Labs     11/04/23  1319 11/04/23  1841 11/04/23  1845    139  --    K 5.5* 4.5  --     103  --    CO2 16* 22*  --    BUN 31* 24*  --    CREATININE 0.9 0.9  --    GLU 64* 73  --    MG  --   --  1.5*        Recent Labs     11/04/23  1123   PH 7.345*   PCO2 41.9   PO2 35*   HCO3 22.9*   POCSATURATED 64   BE -3*        OXYGEN:  Flow (L/min): 3          MEWS score: 4    charge RN, Haydee  contacted for elevated CIWA. Reports patient has transfer orders to MICU, patient to be transported to [facility] shortly. No additional concerns verbalized at this time. Instructed to call [facility] for further concerns or assistance.    ELPIDIO Harris RN

## 2023-11-05 NOTE — CONSULTS
NIAS consulted for IV access.  Nurse obtained access prior to arrival.  Patient has adequate access at this time.  Re consult NIAS if needed.

## 2023-11-05 NOTE — PLAN OF CARE
Problem: Violence Risk or Actual  Goal: Anger and Impulse Control  Outcome: Ongoing, Progressing     Problem: Adult Inpatient Plan of Care  Goal: Plan of Care Review  Outcome: Ongoing, Progressing  Goal: Patient-Specific Goal (Individualized)  Outcome: Ongoing, Progressing  Goal: Absence of Hospital-Acquired Illness or Injury  Outcome: Ongoing, Progressing  Goal: Optimal Comfort and Wellbeing  Outcome: Ongoing, Progressing  Goal: Readiness for Transition of Care  Outcome: Ongoing, Progressing     Problem: Fall Injury Risk  Goal: Absence of Fall and Fall-Related Injury  Outcome: Ongoing, Progressing     Problem: Restraint, Nonbehavioral (Nonviolent)  Goal: Absence of Harm or Injury  Outcome: Ongoing, Progressing     Problem: Diabetes Comorbidity  Goal: Blood Glucose Level Within Targeted Range  Outcome: Ongoing, Progressing     Problem: Skin Injury Risk Increased  Goal: Skin Health and Integrity  Outcome: Ongoing, Progressing    Pt remains in bed with the call light in reach and the bed alarm on. Report given to CLAUDIA Patel

## 2023-11-05 NOTE — ASSESSMENT & PLAN NOTE
Takes clonidine, HCTZ, lisinopril, losartan and metoprolol per MAR    --holding metoprolol for now  --continue HCTZ, lisinopril and losartan for now. Needs med rec.   --unclear she takes her PO meds as prescribed per chart review

## 2023-11-05 NOTE — H&P
[facility]  Critical Care Medicine  History & Physical    Patient Name: Earl Abdul  MRN: [id number]  Admission Date: 11/4/2023  Hospital Length of Stay: 0 days  Code Status: Full Code  Attending Physician: Teddy Serrano MD   Primary Care Provider: Andrew Rodriguez MD   Principal Problem: Alcohol use disorder, severe, dependence    Subjective:     HPI:  Earl Abdul is a 63 year old female with depression, ETOH abuse, diabetes mellitus, COPD (2L home O2), HTN, HLD, hypothyroidism who [place] [facility] [place] for depression.  History obtained via chart review as patient drowsy upon my interview. Patient reports daily drinking of 1 bottle of hard liquor, feels depressed as her  left her yesterday.  She called EMS as she wanted to stop drinking and go to rehab. She reports hx of alcohol withdrawal seizures. Denies SI/HI.  Last alcoholic drink this morning.      While in the ED, patient became agitated and aggressive. She received total of 5mg of lorazepam and haldol 5mg IV x1.   Patient was admitted to hospital medicine for ETOH withdrawal management. She was started on chlordiazepoxide taper. Agitation worsened and droperidol .625 mg, ziprasidone 20 mg and benadryl 50 mg IV was given. Patient also PEC'd during this time. Critical care medicine consulted for agitation with ETOH withdrawal.          Hospital/ICU Course:  11/4/23 Pt became agitated and aggressive in the ED.  Required physical and chemical restraints.  The restraints were removed when the MICU team evaluated the patient on the floor.         Past Medical History:   Diagnosis Date    Anemia     Anemia     Controlled type 2 diabetes mellitus without complication, without long-term current use of insulin 11/30/2021    COPD (chronic obstructive pulmonary disease)     Depression     Diverticulitis     Fatty liver     GERD (gastroesophageal reflux disease)     Hyperlipidemia     Hypertension     Pancreatitis      Peptic ulcer disease     Polysubstance abuse     Posterior reversible encephalopathy syndrome     Sarcoidosis of lung     Sarcoidosis of lung     over 30 yrs ago    Seizures     7/2017    Suicide attempt     Suicide ideation        Past Surgical History:   Procedure Laterality Date    APPENDECTOMY      BILATERAL MASTECTOMY Bilateral 10/29/2020    Procedure: MASTECTOMY, BILATERAL;  Surgeon: Baylee Kevin MD;  Location: [address];  Service: General;  Laterality: Bilateral;    BREAST REVISION SURGERY Bilateral 2/11/2021    Procedure: BREAST REVISION SURGERY;  Surgeon: Scottie Johnson MD;  Location: [address];  Service: Plastics;  Laterality: Bilateral;    COLONOSCOPY N/A 7/28/2017    Procedure: COLONOSCOPY;  Surgeon: Aaron Alvarado MD;  Location: [facility];  Service: Endoscopy;  Laterality: N/A;    ESOPHAGOGASTRODUODENOSCOPY  10/7/2016, 11/6/2014    2016 - gastritis, duodenitis, 2014 erosive gastritis    ESOPHAGOGASTRODUODENOSCOPY N/A 2/11/2020    Procedure: ESOPHAGOGASTRODUODENOSCOPY (EGD);  Surgeon: Fawn Garrido MD;  Location: [facility];  Service: Endoscopy;  Laterality: N/A;    ESOPHAGOGASTRODUODENOSCOPY N/A 4/19/2021    Procedure: EGD (ESOPHAGOGASTRODUODENOSCOPY);  Surgeon: Paramjit Martino MD;  Location: [facility];  Service: Endoscopy;  Laterality: N/A;    FLEXIBLE SIGMOIDOSCOPY  11/06/2014    colitis    HYSTERECTOMY      IMPLANTATION OF PERMANENT SACRAL NERVE STIMULATOR N/A 7/12/2022    Procedure: INSERTION, NEUROSTIMULATOR, PERMANENT, SACRAL;  Surgeon: Juaquin Edwards MD;  Location: [address];  Service: Urology;  Laterality: N/A;  1hr    INJECTION FOR SENTINEL NODE IDENTIFICATION Right 10/29/2020    Procedure: INJECTION, FOR SENTINEL NODE IDENTIFICATION;  Surgeon: Baylee Kevin MD;  Location: [address];  Service: General;  Laterality: Right;    INJECTION OF JOINT Right 10/10/2019    Procedure: Injection, Joint RIGHT ILIOPSOAS BURSA/TENDON INJECTION AND RIGHT GLUTEAL  TENDON INJECTION WITH STEROID AND LIDOCAINE;  Surgeon: Guillaume Rico MD;  Location: [facility];  Service: Pain Management;  Laterality: Right;  NEEDS CONSENT    INSERTION OF BREAST TISSUE EXPANDER Bilateral 10/29/2020    Procedure: INSERTION, TISSUE EXPANDER, BREAST;  Surgeon: Scottie Johnson MD;  Location: [address];  Service: Plastics;  Laterality: Bilateral;  Right breast: 1082 g  Left breast: 1076 g    LIPOSUCTION Bilateral 2021    Procedure: LIPOSUCTION;  Surgeon: Scottie Johnson MD;  Location: [facility] OR [address] [place] [address];  Service: Plastics;  Laterality: Bilateral;    mediastenoscopy      REPLACEMENT OF IMPLANT OF BREAST Bilateral 2021    Procedure: REPLACEMENT, IMPLANT, BREAST;  Surgeon: Scottie Johnson MD;  Location: [address];  Service: Plastics;  Laterality: Bilateral;    SENTINEL LYMPH NODE BIOPSY Right 10/29/2020    Procedure: BIOPSY, LYMPH NODE, SENTINEL;  Surgeon: Baylee Kevin MD;  Location: [address];  Service: General;  Laterality: Right;    TONSILLECTOMY N/A     TUBAL LIGATION         Review of patient's allergies indicates:   Allergen Reactions    Lortab [hydrocodone-acetaminophen] Itching    Promethazine Itching and Other (See Comments)       Family History       Problem Relation (Age of Onset)    Breast cancer Maternal Aunt, Daughter    Colon cancer Maternal Uncle    Diabetes Father, Mother    Heart attack Father    Hypertension Father, Mother          Tobacco Use    Smoking status: Every Day     Current packs/day: 0.00     Average packs/day: 0.5 packs/day for 30.0 years (15.0 ttl pk-yrs)     Types: Vaping with nicotine, Cigarettes     Start date: 1991     Last attempt to quit: 2021     Years since quittin.7    Smokeless tobacco: Never    Tobacco comments:     Patient is currently smoking 10 cigarettes a day, declines nicotine patches   Substance and Sexual Activity    Alcohol use: Yes     Comment: vodka daily (half a regular bottle) for  4 days    Drug use: Yes     Types: Marijuana     Comment: gummies    Sexual activity: Yes     Birth control/protection: Surgical      Review of Systems   Unable to perform ROS: Mental status change     Objective:     Vital Signs (Most Recent):  Temp: 99 °F (37.2 °C) (11/04/23 1932)  Pulse: 108 (11/04/23 1932)  Resp: 19 (11/04/23 1932)  BP: (!) 160/70 (11/04/23 1932)  SpO2: (!) 94 % (11/04/23 1932) Vital Signs (24h Range):  Temp:  [97.7 °F (36.5 °C)-99 °F (37.2 °C)] 99 °F (37.2 °C)  Pulse:  [100-126] 108  Resp:  [16-31] 19  SpO2:  [92 %-98 %] 94 %  BP: (129-176)/(57-81) 160/70   Weight: 87.1 kg (192 lb)  Body mass index is 30.99 kg/m².    No intake or output data in the 24 hours ending 11/04/23 2057       Physical Exam  Vitals reviewed.   Constitutional:       Appearance: She is well-developed. She is ill-appearing.   HENT:      Head: Normocephalic and atraumatic.   Eyes:      Pupils: Pupils are equal, round, and reactive to light.   Cardiovascular:      Rate and Rhythm: Normal rate and regular rhythm.      Heart sounds: Normal heart sounds.   Pulmonary:      Effort: Pulmonary effort is normal.      Breath sounds: Normal breath sounds.   Abdominal:      General: Bowel sounds are normal.      Palpations: Abdomen is soft.      Tenderness: There is no abdominal tenderness.   Musculoskeletal:      Right lower leg: No edema.      Left lower leg: No edema.   Skin:     General: Skin is warm and dry.   Neurological:      Mental Status: She is easily aroused. She is lethargic.      Comments: Oriented to person only.  Drowsy.             Vents:     Lines/Drains/Airways       Peripheral Intravenous Line  Duration                  Peripheral IV - Single Lumen 11/04/23 1119 22 G Anterior;Right Hand <1 day                  Significant Labs:    CBC/Anemia Profile:  Recent Labs   Lab 11/04/23  1120 11/04/23  1123 11/04/23  1329   WBC 33.05*  --   --    HGB 11.6*  --   --    HCT 39.4 38 35*     --   --    MCV 88  --   --   "  RDW 17.2*  --   --    IRON 269*  --   --         Chemistries:  Recent Labs   Lab 11/04/23  1319 11/04/23  1841 11/04/23  1845    139  --    K 5.5* 4.5  --     103  --    CO2 16* 22*  --    BUN 31* 24*  --    CREATININE 0.9 0.9  --    CALCIUM 8.9 8.8  --    ALBUMIN 4.3 4.2  --    PROT 7.4 7.0  --    BILITOT 0.4 0.3  --    ALKPHOS 63 57  --    ALT 23 23  --    AST 34 34  --    MG  --   --  1.5*       All pertinent labs within the past 24 hours have been reviewed.    Significant Imaging: I have reviewed all pertinent imaging results/findings within the past 24 hours.    Assessment/Plan:     Psychiatric  * Alcohol use disorder, severe, dependence  Per chart review, drinks "hard liquor daily" but unable to quantify. Last drink this morning. ETOH level 211 on admit. Urine tox negative. Admitted previously for ETOH withdrawal symptoms. Patient reported withdrawal seizures in the past.     --scheduled valium  --CIWA q2; PRN ativan for CIWA >8  --thiamine, folic acid      Depression with anxiety  Presented complaining of depression. Takes abilify as an outpatient. Psychiatry consulted. PEC'd by hospital medicine.     Alcohol withdrawal  See above.     Pulmonary  COPD (chronic obstructive pulmonary disease)  PFT 3/2022: +obstruction with FEV1 67% of predicted. Last seen by Dr. Jones 3/2022.   Continues to smoke. Has a history of sarcoidosis.     --oxygen as needed for saturation goal 88-90%    Cardiac/Vascular  Essential hypertension  Takes clonidine, HCTZ, lisinopril, losartan and metoprolol per MAR    --holding metoprolol for now  --continue HCTZ, lisinopril and losartan for now. Needs med rec.   --unclear she takes her PO meds as prescribed per chart review    Oncology  CLL (chronic lymphocytic leukemia)  Last seen by Dr. Montano on 10/31/2023.     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    Endocrine  Type 2 diabetes mellitus with hypoglycemia  Last hemoglobin A1c 4.5 in June 2023.     --repeat pending  --hypoglycemic since admit  --LDSSI / POCT glucose goal 140-180    Other  Tobacco abuse  Smoking cessation education      Discussed with PCCM fellow, Dr. Taveras. Primary team updated on plan of care.     Critical Care Time: 45 minutes  Critical secondary to Patient has a condition that poses threat to life and bodily function: ETOH withdrawal symptoms/encephalopathy    Critical care was time spent personally by me on the following activities: development of treatment plan with patient or surrogate and bedside caregivers, discussions with consultants, evaluation of patient's response to treatment, examination of patient, ordering and performing treatments and interventions, ordering and review of laboratory studies, ordering and review of radiographic studies, pulse oximetry, re-evaluation of patient's condition. This critical care time did not overlap with that of any other provider or involve time for any procedures.     Eufemia Lala PA-C  Critical Care Medicine  [facility] - Cardiac Medical ICU

## 2023-11-05 NOTE — ASSESSMENT & PLAN NOTE
Takes clonidine, HCTZ, lisinopril, losartan and metoprolol per MAR    --holding metoprolol and lisinopril for now  --continue HCTZ and losartan for now. Needs med rec.   --unclear she takes her PO meds as prescribed per chart review

## 2023-11-05 NOTE — CONSULTS
"CONSULTATION LIAISON PSYCHIATRY INITIAL EVALUATION    Patient Name: Earl Abdul  MRN: [id number]  Patient Class: OP- Observation  Admission Date: 11/4/2023  Attending Physician: Teddy Serrano MD      HPI:   Earl Abdul is a 63 y.o. female with past psychiatric history of tobacco abuse, alcohol abuse with seizures and complicated withdrawal, and depression with SA in 2017 & past pertinent medical history of breast cancer, HTN, HLD, fibromyalgia, sarcoidosis, hypothyroidism, T2DM, CLL (not on treatment) and COPD  presents to the ED/admitted to the hospital for Alcohol use disorder, severe, dependence. UDS neg, .      Psychiatry consulted for "recurrent admissions for alcohol and now in withdrawal; SI"      On 11/4, pt received Ativan 2mg IV at 12:38 PM, ativan 1mg Iv at 2:43 PM, and ativan 2mg IV at 3:32 PM; haldol 2.5mg IV at 3:31 PM and 4:17 PM; and droperidol at 0.625mg IV at 4:41 PM. At 6:10PM pt sleeping and unable to be arouse.     On 11/5 evaluation, patient awake and able to communicate with provider, though noted to be markedly drowsy with frequent irrational thought process. Says she is here because she "called the ambulance because she was drinking too much." Mentions that her  left her the day prior to admission. Denies  leaving her before, despite this being mentioned in previous admissions. Says she believes  "doesn't want to be  to an alcoholic." Reports being in a rehab facility about a month and began drinking shortly after she left rehab about 1 week ago. Tells providers that she has taken Abilify for depression, but denies trying or being on any other medications initially. Then states she has also taken Trazodone.  She denies suicidality at this time. Reports interest in abstaining from alcohol moving forward. Specifically states that she is "interested in IOP." Otherwise, was not able to obtain any further meaningful information from patient, as she " "often nodded off and also became agitated towards end of interview stating that she "wants to go home."      On chart review:   9/16/23 admitted for alcohol withdrawals  7/17/23 admitted for alcohol withdrawals; psych rec'd to enroll in rehab after discharge  5/15/23 admitted for alcohol withdrawals; psych rec'd to enroll in rehab after discharge  4/25/23 admitted for alcohol withdrawals; psych rec'd to enroll in rehab after discharge     Collateral with patient's permission:   Henrique Abdul (Spouse)  [phone] (Home Phone)      Medical Review of Systems:  Pertinent items are noted in HPI.    Difficulty assessing full psych ROS secondary to acute condition   Psychiatric Review of Systems (is patient experiencing or having changes in):  sleep: N/A  appetite: N/A  weight: N/A  energy/anergy: N/A  interest/pleasure/anhedonia: N/A  anxiety/panic: N/A  guilty/hopelessness: N/A  concentration: N/A  Ursula:N/A  Psychosis: N/A  Trauma: N/A  S.I.B.s/risky behavior: N/A    Past Psychiatric History:  Previous Medication Trials: Abilify, Doxepin, Clonidine, Cymbalta, Prozac, Remeron, Trazodone, Buspar, Pristiq  Previous Psychiatric Hospitalizations: Yes most recently at [facility] in [place] in 9/18 due to concern for     Previous Suicide Attempts: yes  History of Violence: no  Outpatient Psychiatrist: no  Family Psychiatric History: unknown     Substance Abuse History (with emphasis over the last 12 months):  Recreational Drugs:  marijuana  Use of Alcohol: heavy  Tobacco Use:yes  Rehab History:yes, [place] facility 1/23-2/23     Social History:  Marital Status:   Children: yes  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  Education:  unknown  Special Ed: unknown  Housing Status: with spouse  Access to gun: no  Psychosocial Stressors: death of son and mother, medical issues  Functioning Relationships: good support system     Legal History:  Past Charges/Incarcerations: no  Pending charges:no     Mental " "Status Exam:  General Appearance: appears stated age, well-developed, well-nourished, dressed in hospital garb, in no acute distress  Behavior:  initially cooperative though nods off frequently  Involuntary Movements and Motor Activity: +tremors  Gait and Station: unable to assess - patient lying down or seated  Speech and Language: normal rate, normal rhythm, normal volume, normal tone  Mood: "tired"  Affect: irritable  Thought Process and Associations: illogical  Thought Content and Perceptions:: no suicidal or homicidal ideation, no auditory or visual hallucinations, no paranoid ideation, no ideas of reference, no evidence of delusions or psychosis  Sensorium and Orientation:  oriented to person, place, month and year  Recent and Remote Memory: impaired  Attention and Concentration: impaired  Fund of Knowledge: grossly intact, correctly identifies the   Insight: poor  Judgment: limited    CAM ICU positive? Did not assess      ASSESSMENT & RECOMMENDATIONS   Alcohol Use Disorder, severe  Unspecified Depressive Disorder   R/o Delirium    PSYCH MEDICATIONS  Scheduled   - continue Valium taper    - continue Abilify 10 mg daily   - continue Lexapro 10 mg daily   - start Trazodone 200 mg nightly     PRN     - continue PRN Ativan 2 mg for anxiety     DELIRIUM  DELIRIUM BEHAVIOR MANAGEMENT  PLEASE utilize CHEMICAL restraints with PRN meds first for agitation. Minimize use of PHYSICAL restraints OR have periods of being out of physical restraints if possible.  Keep window shades open and room lit during day and room dim at night in order to promote normal sleep-wake cycles  Encourage family at bedside. [place] patient often to situation, location, date.  Continue to Limit or Discontinue use of Narcotics, Benzos and Anti-cholinergic medications as they may worsen delirium.  Continue medical workup for causative etiology of Delirium.     OTHER PERTINENT DIAGNOSIS    RISK ASSESSMENT  NEEDS PEC " because patient is in imminent danger of hurting self or others and is gravely disabled. & NEEDS 1:1 sitter    FOLLOW UP  Will follow up while in house    DISPOSITION - once medically cleared:   Defer to medical team    Please contact ON CALL psychiatry service (24/7) for any acute issues that may arise.    Dr. Alejandro Gibbs   Psychiatry  [facility]  11/5/2023 4:06 PM        --------------------------------------------------------------------------------------------------------------------------------------------------------------------------------------------------------------------------------------    CONTINUED HISTORY & OBJECTIVE clinical data & findings reviewed and noted for above decision making    Past Medical/Surgical History:   Past Medical History:   Diagnosis Date    Anemia     Anemia     Controlled type 2 diabetes mellitus without complication, without long-term current use of insulin 11/30/2021    COPD (chronic obstructive pulmonary disease)     Depression     Diverticulitis     Fatty liver     GERD (gastroesophageal reflux disease)     Hyperlipidemia     Hypertension     Pancreatitis     Peptic ulcer disease     Polysubstance abuse     Posterior reversible encephalopathy syndrome     Sarcoidosis of lung     Sarcoidosis of lung     over 30 yrs ago    Seizures     7/2017    Suicide attempt     Suicide ideation      Past Surgical History:   Procedure Laterality Date    APPENDECTOMY      BILATERAL MASTECTOMY Bilateral 10/29/2020    Procedure: MASTECTOMY, BILATERAL;  Surgeon: Baylee Kevin MD;  Location: [address];  Service: General;  Laterality: Bilateral;    BREAST REVISION SURGERY Bilateral 2/11/2021    Procedure: BREAST REVISION SURGERY;  Surgeon: Scottie Johnson MD;  Location: [facility] OR [address] [place] [address];  Service: Plastics;  Laterality: Bilateral;    COLONOSCOPY N/A 7/28/2017    Procedure: COLONOSCOPY;  Surgeon: Aaron Alvarado MD;  Location: [facility];  Service: Endoscopy;   Laterality: N/A;    ESOPHAGOGASTRODUODENOSCOPY  10/7/2016, 11/6/2014    2016 - gastritis, duodenitis, 2014 erosive gastritis    ESOPHAGOGASTRODUODENOSCOPY N/A 2/11/2020    Procedure: ESOPHAGOGASTRODUODENOSCOPY (EGD);  Surgeon: Fawn Garrido MD;  Location: [facility];  Service: Endoscopy;  Laterality: N/A;    ESOPHAGOGASTRODUODENOSCOPY N/A 4/19/2021    Procedure: EGD (ESOPHAGOGASTRODUODENOSCOPY);  Surgeon: Paramjit Martino MD;  Location: [facility];  Service: Endoscopy;  Laterality: N/A;    FLEXIBLE SIGMOIDOSCOPY  11/06/2014    colitis    HYSTERECTOMY      IMPLANTATION OF PERMANENT SACRAL NERVE STIMULATOR N/A 7/12/2022    Procedure: INSERTION, NEUROSTIMULATOR, PERMANENT, SACRAL;  Surgeon: Juaquin Edwards MD;  Location: [facility] OR [address] [place] [address];  Service: Urology;  Laterality: N/A;  1hr    INJECTION FOR SENTINEL NODE IDENTIFICATION Right 10/29/2020    Procedure: INJECTION, FOR SENTINEL NODE IDENTIFICATION;  Surgeon: Baylee Kevin MD;  Location: [address];  Service: General;  Laterality: Right;    INJECTION OF JOINT Right 10/10/2019    Procedure: Injection, Joint RIGHT ILIOPSOAS BURSA/TENDON INJECTION AND RIGHT GLUTEAL TENDON INJECTION WITH STEROID AND LIDOCAINE;  Surgeon: Guillaume Rico MD;  Location: [facility];  Service: Pain Management;  Laterality: Right;  NEEDS CONSENT    INSERTION OF BREAST TISSUE EXPANDER Bilateral 10/29/2020    Procedure: INSERTION, TISSUE EXPANDER, BREAST;  Surgeon: Scottie Johnson MD;  Location: [address];  Service: Plastics;  Laterality: Bilateral;  Right breast: 1082 g  Left breast: 1076 g    LIPOSUCTION Bilateral 2/11/2021    Procedure: LIPOSUCTION;  Surgeon: Scottie Johnson MD;  Location: [facility] OR [address] [place] [address];  Service: Plastics;  Laterality: Bilateral;    mediastenoscopy      REPLACEMENT OF IMPLANT OF BREAST Bilateral 2/11/2021    Procedure: REPLACEMENT, IMPLANT, BREAST;  Surgeon: Scottie Johnson MD;  Location: [facility] OR [address] [place] [address];  Service: Plastics;   Laterality: Bilateral;    SENTINEL LYMPH NODE BIOPSY Right 10/29/2020    Procedure: BIOPSY, LYMPH NODE, SENTINEL;  Surgeon: Baylee Kevin MD;  Location: [facility] OR [address] [place] [address];  Service: General;  Laterality: Right;    TONSILLECTOMY N/A 1970    TUBAL LIGATION         Current Medications:   Scheduled Meds:    albuterol-ipratropium  3 mL Nebulization Q6H    ARIPiprazole  10 mg Oral Daily    atorvastatin  10 mg Oral Daily    diazePAM  10 mg Intravenous Q8H    enoxparin  40 mg Subcutaneous Daily    EScitalopram oxalate  10 mg Oral Daily    folic acid (FOLVITE) IVPB  1 mg Intravenous Daily    hydroCHLOROthiazide  25 mg Oral Daily    levothyroxine  50 mcg Oral Before breakfast    losartan  25 mg Oral Daily    multivitamin  1 tablet Oral Daily    pantoprazole  40 mg Oral Daily    potassium, sodium phosphates  2 packet Oral Q4H    thiamine (B-1) 250 mg in dextrose 5 % (D5W) 100 mL IVPB  250 mg Intravenous Daily    Followed by    [START ON 11/7/2023] thiamine  100 mg Oral TID     PRN Meds: albuterol-ipratropium, dextrose 10%, dextrose 10%, glucagon (human recombinant), glucose, glucose, lorazepam, naloxone, sodium chloride 0.9%    Allergies:   Review of patient's allergies indicates:   Allergen Reactions    Lortab [hydrocodone-acetaminophen] Itching    Promethazine Itching and Other (See Comments)       Vitals  Vitals:    11/05/23 1300   BP:    Pulse: 83   Resp: (!) 22       Labs/Imaging/Studies:  Recent Results (from the past 24 hour(s))   Comprehensive Metabolic Panel (CMP)    Collection Time: 11/04/23  6:41 PM   Result Value Ref Range    Sodium 139 136 - 145 mmol/L    Potassium 4.5 3.5 - 5.1 mmol/L    Chloride 103 95 - 110 mmol/L    CO2 22 (L) 23 - 29 mmol/L    Glucose 73 70 - 110 mg/dL    BUN 24 (H) 8 - 23 mg/dL    Creatinine 0.9 0.5 - 1.4 mg/dL    Calcium 8.8 8.7 - 10.5 mg/dL    Total Protein 7.0 6.0 - 8.4 g/dL    Albumin 4.2 3.5 - 5.2 g/dL    Total Bilirubin 0.3 0.1 - 1.0 mg/dL    Alkaline Phosphatase 57 55 - 135 U/L    AST  34 10 - 40 U/L    ALT 23 10 - 44 U/L    eGFR >60.0 >60 mL/min/1.73 m^2    Anion Gap 14 8 - 16 mmol/L   Magnesium    Collection Time: 11/04/23  6:45 PM   Result Value Ref Range    Magnesium 1.5 (L) 1.6 - 2.6 mg/dL   Lactic Acid, Plasma    Collection Time: 11/04/23  9:29 PM   Result Value Ref Range    Lactate (Lactic Acid) 0.7 0.5 - 2.2 mmol/L   Hemoglobin A1c    Collection Time: 11/04/23  9:29 PM   Result Value Ref Range    Hemoglobin A1C 5.5 4.0 - 5.6 %    Estimated Avg Glucose 111 68 - 131 mg/dL   Magnesium    Collection Time: 11/05/23  5:29 AM   Result Value Ref Range    Magnesium 2.2 1.6 - 2.6 mg/dL   Phosphorus    Collection Time: 11/05/23  5:29 AM   Result Value Ref Range    Phosphorus 2.0 (L) 2.7 - 4.5 mg/dL   CBC with Automated Differential    Collection Time: 11/05/23  5:29 AM   Result Value Ref Range    WBC 8.32 3.90 - 12.70 K/uL    RBC 3.96 (L) 4.00 - 5.40 M/uL    Hemoglobin 10.4 (L) 12.0 - 16.0 g/dL    Hematocrit 34.1 (L) 37.0 - 48.5 %    MCV 86 82 - 98 fL    MCH 26.3 (L) 27.0 - 31.0 pg    MCHC 30.5 (L) 32.0 - 36.0 g/dL    RDW 16.3 (H) 11.5 - 14.5 %    Platelets 105 (L) 150 - 450 K/uL    MPV 10.1 9.2 - 12.9 fL    Immature Granulocytes 0.2 0.0 - 0.5 %    Gran # (ANC) 1.5 (L) 1.8 - 7.7 K/uL    Immature Grans (Abs) 0.02 0.00 - 0.04 K/uL    Lymph # 6.3 (H) 1.0 - 4.8 K/uL    Mono # 0.5 0.3 - 1.0 K/uL    Eos # 0.1 0.0 - 0.5 K/uL    Baso # 0.02 0.00 - 0.20 K/uL    nRBC 0 0 /100 WBC    Gran % 17.7 (L) 38.0 - 73.0 %    Lymph % 75.8 (H) 18.0 - 48.0 %    Mono % 5.5 4.0 - 15.0 %    Eosinophil % 0.6 0.0 - 8.0 %    Basophil % 0.2 0.0 - 1.9 %    Aniso Slight     Poik Slight     Poly Occasional     Ovalocytes Occasional     Smudge Cells Present     Differential Method Automated    Comprehensive metabolic panel    Collection Time: 11/05/23  5:29 AM   Result Value Ref Range    Sodium 139 136 - 145 mmol/L    Potassium 4.0 3.5 - 5.1 mmol/L    Chloride 102 95 - 110 mmol/L    CO2 22 (L) 23 - 29 mmol/L    Glucose 82 70 - 110  mg/dL    BUN 17 8 - 23 mg/dL    Creatinine 0.8 0.5 - 1.4 mg/dL    Calcium 9.3 8.7 - 10.5 mg/dL    Total Protein 7.0 6.0 - 8.4 g/dL    Albumin 4.1 3.5 - 5.2 g/dL    Total Bilirubin 0.7 0.1 - 1.0 mg/dL    Alkaline Phosphatase 58 55 - 135 U/L    AST 34 10 - 40 U/L    ALT 23 10 - 44 U/L    eGFR >60.0 >60 mL/min/1.73 m^2    Anion Gap 15 8 - 16 mmol/L   Comprehensive Metabolic Panel (CMP)    Collection Time: 11/05/23  9:38 AM   Result Value Ref Range    Sodium 140 136 - 145 mmol/L    Potassium 4.3 3.5 - 5.1 mmol/L    Chloride 100 95 - 110 mmol/L    CO2 25 23 - 29 mmol/L    Glucose 88 70 - 110 mg/dL    BUN 15 8 - 23 mg/dL    Creatinine 0.8 0.5 - 1.4 mg/dL    Calcium 8.9 8.7 - 10.5 mg/dL    Total Protein 6.7 6.0 - 8.4 g/dL    Albumin 4.0 3.5 - 5.2 g/dL    Total Bilirubin 0.8 0.1 - 1.0 mg/dL    Alkaline Phosphatase 57 55 - 135 U/L    AST 37 10 - 40 U/L    ALT 25 10 - 44 U/L    eGFR >60.0 >60 mL/min/1.73 m^2    Anion Gap 15 8 - 16 mmol/L   POCT glucose    Collection Time: 11/05/23 10:11 AM   Result Value Ref Range    POCT Glucose 168 (H) 70 - 110 mg/dL     Imaging Results              CT Head Without Contrast (Final result)  Result time 11/05/23 11:25:06      Final result by James Sotelo DO (11/05/23 11:25:06)                   Impression:      Unremarkable motion distorted CT head as detailed above specifically without evidence for acute intracranial hemorrhage or sulcal effacement to suggest large territory recent infarction.    Clinical correlation and further evaluation as warranted.      Electronically signed by: James Sotelo DO  Date:    11/05/2023  Time:    11:25               Narrative:    EXAMINATION:  CT HEAD WITHOUT CONTRAST    CLINICAL HISTORY:  fall, headache;    TECHNIQUE:  Multiple sequential 5 mm axial images of the head without contrast.  Coronal and sagittal reformatted imaging from the axial acquisition.    COMPARISON:  09/17/2023    FINDINGS:  Study is distorted by motion artifacts despite repeat  examination.  Within limits of the study there is no evidence for acute intracranial hemorrhage or sulcal effacement to suggest large territory recent infarction.  Stable hypodensity left inferior basal ganglia suggestive for prominent perivascular space.  Ventricles relatively stable without hydrocephalus.  No midline shift or significant mass effect.  Visualized paranasal sinuses and mastoid air cells are clear.                                       X-Ray Chest AP Portable (Final result)  Result time 11/04/23 12:08:26      Final result by Avani Vera MD (11/04/23 12:08:26)                   Impression:      No acute abnormality or detrimental change since September 18, 2023.      Electronically signed by: Avani Vera MD  Date:    11/04/2023  Time:    12:08               Narrative:    EXAMINATION:  XR CHEST AP PORTABLE    CLINICAL HISTORY:  hypoxic;    TECHNIQUE:  Single frontal view of the chest was performed.    COMPARISON:  September 18, 2023    FINDINGS:  Surgical clips are again identified overlying the chest.The lungs are free of lobar consolidation and alveolar edema and similar to the previous examination, with normal appearance of pulmonary vasculature and no large pleural effusion or pneumothorax.    The cardiac silhouette is normal in size. The hilar and mediastinal contours are unremarkable.    Visualized osseous structures are stable with degenerative changes of the spine and a mild thoracic dextroscoliosis.

## 2023-11-05 NOTE — ASSESSMENT & PLAN NOTE
Presented complaining of depression. Takes abilify as an outpatient. Psychiatry consulted. PEC'd by [facility] medicine.

## 2023-11-05 NOTE — ASSESSMENT & PLAN NOTE
PFT 3/2022: +obstruction with FEV1 67% of predicted. Last seen by Dr. Jones 3/2022.   Continues to smoke. Has a history of sarcoidosis.     --oxygen as needed for saturation goal 88-90%  --nebs q6h

## 2023-11-05 NOTE — ASSESSMENT & PLAN NOTE
Per last heme/onc note: patient underwent bilateral mastectomy for T1b N0 stage IA breast cancer October 2020 with no adjuvant therapy and was started on Letrozole February 2021-may 2021.

## 2023-11-05 NOTE — ASSESSMENT & PLAN NOTE
PFT 3/2022: +obstruction with FEV1 67% of predicted. Last seen by Dr. Jones 3/2022.   Continues to smoke. Has a history of sarcoidosis.     --oxygen as needed for saturation goal 88-90%

## 2023-11-05 NOTE — ASSESSMENT & PLAN NOTE
Presented complaining of depression. Takes abilify as an outpatient. Psychiatry consulted. PEC'd by [facility] medicine.

## 2023-11-05 NOTE — PROGRESS NOTES
[facility] - [facility]  Critical Care Medicine  Progress Note    Patient Name: Earl Abdul  MRN: [id number]  Admission Date: 11/4/2023  Hospital Length of Stay: 0 days  Code Status: Full Code  Attending Provider: Teddy Serrano MD  Primary Care Provider: Andrew Rodriguez MD   Principal Problem: Alcohol use disorder, severe, dependence    Subjective:     HPI:  Earl Abdul is a 63 year old female with depression, ETOH abuse, diabetes mellitus, COPD (2L home O2), HTN, HLD, hypothyroidism who [place] [facility] [place] for depression.  History obtained via chart review as patient drowsy upon my interview. Patient reports daily drinking of 1 bottle of hard liquor, feels depressed as her  left her yesterday.  She called EMS as she wanted to stop drinking and go to rehab. She reports hx of alcohol withdrawal seizures. Denies SI/HI.  Last alcoholic drink this morning.      While in the ED, patient became agitated and aggressive. She received total of 5mg of lorazepam and haldol 5mg IV x1.   Patient was admitted to hospital medicine for ETOH withdrawal management. She was started on chlordiazepoxide taper. Agitation worsened and droperidol .625 mg, ziprasidone 20 mg and benadryl 50 mg IV was given. Patient also PEC'd during this time. Critical care medicine consulted for agitation with ETOH withdrawal.          Hospital/ICU Course:  Pt became agitated and aggressive in the ED. Required physical and chemical restraints. The restraints were removed when the MICU team evaluated the patient on the floor. Pt has been drowsy but not agitated while in the ICU, can step down today.      Interval History/Significant Events: Pt was calm. Oriented only to person and place. No SI/HI.     Objective:     Vital Signs (Most Recent):  Temp:  (occurred after DST) (11/05/23 0102)  Pulse: 65 (11/05/23 0837)  Resp: 20 (11/05/23 0837)  BP: (!) 144/65 (11/05/23 0301)  SpO2: 98 % (11/05/23 0301) Vital Signs (24h  Range):  Temp:  [97.7 °F (36.5 °C)-99.1 °F (37.3 °C)] 99.1 °F (37.3 °C)  Pulse:  [] 65  Resp:  [16-31] 20  SpO2:  [92 %-98 %] 98 %  BP: (122-176)/(57-97) 144/65   Weight: 87.1 kg (192 lb)  Body mass index is 30.99 kg/m².    No intake or output data in the 24 hours ending 11/05/23 0900       Physical Exam  Vitals and nursing note reviewed.   Constitutional:       Appearance: She is ill-appearing.   HENT:      Head: Normocephalic and atraumatic.   Eyes:      Conjunctiva/sclera: Conjunctivae normal.      Pupils: Pupils are equal, round, and reactive to light.   Cardiovascular:      Rate and Rhythm: Normal rate and regular rhythm.      Heart sounds: Normal heart sounds.   Pulmonary:      Effort: Pulmonary effort is normal.      Breath sounds: Normal breath sounds. No wheezing.   Abdominal:      General: Abdomen is flat.      Palpations: Abdomen is soft.      Tenderness: There is no abdominal tenderness.   Musculoskeletal:      Cervical back: Neck supple.      Right lower leg: Edema (trace) present.      Left lower leg: Edema (trace) present.   Skin:     General: Skin is warm and dry.   Neurological:      Mental Status: She is easily aroused. She is lethargic and disoriented.            Vents:     Lines/Drains/Airways       Peripheral Intravenous Line  Duration                  Peripheral IV - Single Lumen 11/04/23 2100 22 G Right Breast <1 day         Peripheral IV - Single Lumen 11/05/23 0000 22 G Anterior;Distal;Left Forearm <1 day         Peripheral IV - Single Lumen 11/05/23 0000 22 G Left Breast <1 day                  Significant Labs:    CBC/Anemia Profile:  Recent Labs   Lab 11/04/23  1120 11/04/23  1123 11/04/23  1329 11/05/23  0529   WBC 33.05*  --   --  8.32   HGB 11.6*  --   --  10.4*   HCT 39.4 38 35* 34.1*     --   --   --    MCV 88  --   --  86   RDW 17.2*  --   --  16.3*   IRON 269*  --   --   --         Chemistries:  Recent Labs   Lab 11/04/23  1319 11/04/23  1841 11/04/23  1845  "11/05/23  0529    139  --  139   K 5.5* 4.5  --  4.0    103  --  102   CO2 16* 22*  --  22*   BUN 31* 24*  --  17   CREATININE 0.9 0.9  --  0.8   CALCIUM 8.9 8.8  --  9.3   ALBUMIN 4.3 4.2  --  4.1   PROT 7.4 7.0  --  7.0   BILITOT 0.4 0.3  --  0.7   ALKPHOS 63 57  --  58   ALT 23 23  --  23   AST 34 34  --  34   MG  --   --  1.5* 2.2   PHOS  --   --   --  2.0*       All pertinent labs within the past 24 hours have been reviewed.    Significant Imaging:  I have reviewed all pertinent imaging results/findings within the past 24 hours.      ABG  Recent Labs   Lab 11/04/23  1123   PH 7.345*   PO2 35*   PCO2 41.9   HCO3 22.9*   BE -3*     Assessment/Plan:     Psychiatric  * Alcohol use disorder, severe, dependence  Per chart review, drinks "hard liquor daily" but unable to quantify. Last drink this morning. ETOH level 211 on admit. Urine tox negative. Admitted previously for ETOH withdrawal symptoms. Patient reported withdrawal seizures in the past.     --scheduled valium  --CIWA q2; PRN ativan for CIWA >8  --thiamine, folic acid  --PEC'd      Depression with anxiety  Presented complaining of depression. Takes abilify as an outpatient. Psychiatry consulted. PEC'd by hospital medicine.     Alcohol withdrawal  See above.     Pulmonary  COPD (chronic obstructive pulmonary disease)  PFT 3/2022: +obstruction with FEV1 67% of predicted. Last seen by Dr. Jones 3/2022.   Continues to smoke. Has a history of sarcoidosis.     --oxygen as needed for saturation goal 88-90%  --nebs q6h    Cardiac/Vascular  Essential hypertension  Takes clonidine, HCTZ, lisinopril, losartan and metoprolol per MAR    --holding metoprolol and lisinopril for now  --continue HCTZ and losartan for now. Needs med rec.   --unclear she takes her PO meds as prescribed per chart review    Oncology  CLL (chronic lymphocytic leukemia)  Last seen by Dr. Montano on 10/31/2023.     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    Endocrine  Type 2 diabetes mellitus with hypoglycemia  Last hemoglobin A1c 4.5 in June 2023.     --repeat A1c 5.5  --hypoglycemic since admit  --LDSSI / POCT glucose goal 140-180    Other  Tobacco abuse  Smoking cessation education       Critical Care Daily Checklist:    A: Awake: RASS Goal/Actual Goal:    Actual:     B: Spontaneous Breathing Trial Performed?     C: SAT & SBT Coordinated?  n/a                      D: Delirium: CAM-ICU     E: Early Mobility Performed? Yes   F: Feeding Goal:    Status:     Current Diet Order   Procedures    Diet Adult Regular (IDDSI Level 7)      AS: Analgesia/Sedation Valium, CIWA protocol   T: Thromboembolic Prophylaxis Lovenox   H: HOB > 300 Yes   U: Stress Ulcer Prophylaxis (if needed) No   G: Glucose Control No   B: Bowel Function Stool Occurrence: 2   I: Indwelling Catheter (Lines & Boudreaux) Necessity 3 PIVs   D: De-escalation of Antimicrobials/Pharmacotherapies n/a    Plan for the day/ETD Step down    Code Status:  Family/Goals of Care: Full Code         Critical secondary to Patient has a condition that poses threat to life and bodily function: Agitation and alcohol withdrawal      Critical care was time spent personally by me on the following activities: development of treatment plan with patient or surrogate and bedside caregivers, discussions with consultants, evaluation of patient's response to treatment, examination of patient, ordering and performing treatments and interventions, ordering and review of laboratory studies, ordering and review of radiographic studies, pulse oximetry, re-evaluation of patient's condition. This critical care time did not overlap  with that of any other provider or involve time for any procedures.     Kristen Landaverde MD  Critical Care Medicine  [facility] - Cardiac Medical ICU

## 2023-11-05 NOTE — ASSESSMENT & PLAN NOTE
Last hemoglobin A1c 4.5 in June 2023.     --repeat pending  --hypoglycemic since admit  --LDSSI / POCT glucose goal 140-180

## 2023-11-05 NOTE — NURSING
Patient transferred to [facility] ICU RM [id number] with security and RN. Patient on cardiac monitoring and 3L.

## 2023-11-05 NOTE — ASSESSMENT & PLAN NOTE
"Per chart review, drinks "hard liquor daily" but unable to quantify. Last drink this morning. ETOH level 211 on admit. Urine tox negative. Admitted previously for ETOH withdrawal symptoms. Patient reported withdrawal seizures in the past.     --scheduled valium  --CIWA q2; PRN ativan for CIWA >8  --thiamine, folic acid    "

## 2023-11-05 NOTE — ASSESSMENT & PLAN NOTE
"Per chart review, drinks "hard liquor daily" but unable to quantify. Last drink this morning. ETOH level 211 on admit. Urine tox negative. Admitted previously for ETOH withdrawal symptoms. Patient reported withdrawal seizures in the past.     --scheduled valium  --CIWA q2; PRN ativan for CIWA >8  --thiamine, folic acid  --PEC'd    "

## 2023-11-05 NOTE — SUBJECTIVE & OBJECTIVE
Interval History/Significant Events: Pt was calm. Oriented only to person and place. No SI/HI.     Objective:     Vital Signs (Most Recent):  Temp:  (occurred after DST) (11/05/23 0102)  Pulse: 65 (11/05/23 0837)  Resp: 20 (11/05/23 0837)  BP: (!) 144/65 (11/05/23 0301)  SpO2: 98 % (11/05/23 0301) Vital Signs (24h Range):  Temp:  [97.7 °F (36.5 °C)-99.1 °F (37.3 °C)] 99.1 °F (37.3 °C)  Pulse:  [] 65  Resp:  [16-31] 20  SpO2:  [92 %-98 %] 98 %  BP: (122-176)/(57-97) 144/65   Weight: 87.1 kg (192 lb)  Body mass index is 30.99 kg/m².    No intake or output data in the 24 hours ending 11/05/23 0900       Physical Exam  Vitals and nursing note reviewed.   Constitutional:       Appearance: She is ill-appearing.   HENT:      Head: Normocephalic and atraumatic.   Eyes:      Conjunctiva/sclera: Conjunctivae normal.      Pupils: Pupils are equal, round, and reactive to light.   Cardiovascular:      Rate and Rhythm: Normal rate and regular rhythm.      Heart sounds: Normal heart sounds.   Pulmonary:      Effort: Pulmonary effort is normal.      Breath sounds: Normal breath sounds. No wheezing.   Abdominal:      General: Abdomen is flat.      Palpations: Abdomen is soft.      Tenderness: There is no abdominal tenderness.   Musculoskeletal:      Cervical back: Neck supple.      Right lower leg: Edema (trace) present.      Left lower leg: Edema (trace) present.   Skin:     General: Skin is warm and dry.   Neurological:      Mental Status: She is easily aroused. She is lethargic and disoriented.            Vents:     Lines/Drains/Airways       Peripheral Intravenous Line  Duration                  Peripheral IV - Single Lumen 11/04/23 2100 22 G Right Breast <1 day         Peripheral IV - Single Lumen 11/05/23 0000 22 G Anterior;Distal;Left Forearm <1 day         Peripheral IV - Single Lumen 11/05/23 0000 22 G Left Breast <1 day                  Significant Labs:    CBC/Anemia Profile:  Recent Labs   Lab 11/04/23  1120  11/04/23  1123 11/04/23  1329 11/05/23  0529   WBC 33.05*  --   --  8.32   HGB 11.6*  --   --  10.4*   HCT 39.4 38 35* 34.1*     --   --   --    MCV 88  --   --  86   RDW 17.2*  --   --  16.3*   IRON 269*  --   --   --         Chemistries:  Recent Labs   Lab 11/04/23  1319 11/04/23  1841 11/04/23  1845 11/05/23  0529    139  --  139   K 5.5* 4.5  --  4.0    103  --  102   CO2 16* 22*  --  22*   BUN 31* 24*  --  17   CREATININE 0.9 0.9  --  0.8   CALCIUM 8.9 8.8  --  9.3   ALBUMIN 4.3 4.2  --  4.1   PROT 7.4 7.0  --  7.0   BILITOT 0.4 0.3  --  0.7   ALKPHOS 63 57  --  58   ALT 23 23  --  23   AST 34 34  --  34   MG  --   --  1.5* 2.2   PHOS  --   --   --  2.0*       All pertinent labs within the past 24 hours have been reviewed.    Significant Imaging:  I have reviewed all pertinent imaging results/findings within the past 24 hours.

## 2023-11-05 NOTE — ASSESSMENT & PLAN NOTE
Last hemoglobin A1c 4.5 in June 2023.     --repeat A1c 5.5  --hypoglycemic since admit  --LDSSI / POCT glucose goal 140-180

## 2023-11-05 NOTE — SUBJECTIVE & OBJECTIVE
Past Medical History:   Diagnosis Date    Anemia     Anemia     Controlled type 2 diabetes mellitus without complication, without long-term current use of insulin 11/30/2021    COPD (chronic obstructive pulmonary disease)     Depression     Diverticulitis     Fatty liver     GERD (gastroesophageal reflux disease)     Hyperlipidemia     Hypertension     Pancreatitis     Peptic ulcer disease     Polysubstance abuse     Posterior reversible encephalopathy syndrome     Sarcoidosis of lung     Sarcoidosis of lung     over 30 yrs ago    Seizures     7/2017    Suicide attempt     Suicide ideation        Past Surgical History:   Procedure Laterality Date    APPENDECTOMY      BILATERAL MASTECTOMY Bilateral 10/29/2020    Procedure: MASTECTOMY, BILATERAL;  Surgeon: Baylee Kevin MD;  Location: [address];  Service: General;  Laterality: Bilateral;    BREAST REVISION SURGERY Bilateral 2/11/2021    Procedure: BREAST REVISION SURGERY;  Surgeon: Scottie Johnson MD;  Location: [facility] OR [address] [place] [address];  Service: Plastics;  Laterality: Bilateral;    COLONOSCOPY N/A 7/28/2017    Procedure: COLONOSCOPY;  Surgeon: Aaron Alvarado MD;  Location: [facility];  Service: Endoscopy;  Laterality: N/A;    ESOPHAGOGASTRODUODENOSCOPY  10/7/2016, 11/6/2014    2016 - gastritis, duodenitis, 2014 erosive gastritis    ESOPHAGOGASTRODUODENOSCOPY N/A 2/11/2020    Procedure: ESOPHAGOGASTRODUODENOSCOPY (EGD);  Surgeon: Fawn Garrido MD;  Location: [facility];  Service: Endoscopy;  Laterality: N/A;    ESOPHAGOGASTRODUODENOSCOPY N/A 4/19/2021    Procedure: EGD (ESOPHAGOGASTRODUODENOSCOPY);  Surgeon: Paramjit Martino MD;  Location: [facility];  Service: Endoscopy;  Laterality: N/A;    FLEXIBLE SIGMOIDOSCOPY  11/06/2014    colitis    HYSTERECTOMY      IMPLANTATION OF PERMANENT SACRAL NERVE STIMULATOR N/A 7/12/2022    Procedure: INSERTION, NEUROSTIMULATOR, PERMANENT, SACRAL;  Surgeon: Juaquin Edwards MD;  Location: [facility] OR [address] [place] [address];  Service:  Urology;  Laterality: N/A;  1hr    INJECTION FOR SENTINEL NODE IDENTIFICATION Right 10/29/2020    Procedure: INJECTION, FOR SENTINEL NODE IDENTIFICATION;  Surgeon: Baylee Kevin MD;  [facility];  Service: General;  Laterality: Right;    INJECTION OF JOINT Right 10/10/2019    Procedure: Injection, Joint RIGHT ILIOPSOAS BURSA/TENDON INJECTION AND RIGHT GLUTEAL TENDON INJECTION WITH STEROID AND LIDOCAINE;  Surgeon: Guillaume Rico MD;  Location: [facility];  Service: Pain Management;  Laterality: Right;  NEEDS CONSENT    INSERTION OF BREAST TISSUE EXPANDER Bilateral 10/29/2020    Procedure: INSERTION, TISSUE EXPANDER, BREAST;  Surgeon: Scottie Johnson MD;  Location: [facility] OR [address] [place] [address];  Service: Plastics;  Laterality: Bilateral;  Right breast: 1082 g  Left breast: 1076 g    LIPOSUCTION Bilateral 2/11/2021    Procedure: LIPOSUCTION;  Surgeon: Scottie Johnson MD;  Location: [facility] OR [address] [place] [address];  Service: Plastics;  Laterality: Bilateral;    mediastenoscopy      REPLACEMENT OF IMPLANT OF BREAST Bilateral 2/11/2021    Procedure: REPLACEMENT, IMPLANT, BREAST;  Surgeon: Scottie Johnson MD;  Location: [address];  Service: Plastics;  Laterality: Bilateral;    SENTINEL LYMPH NODE BIOPSY Right 10/29/2020    Procedure: BIOPSY, LYMPH NODE, SENTINEL;  Surgeon: Baylee Kevin MD;  Location: [address];  Service: General;  Laterality: Right;    TONSILLECTOMY N/A 1970    TUBAL LIGATION         Review of patient's allergies indicates:   Allergen Reactions    Lortab [hydrocodone-acetaminophen] Itching    Promethazine Itching and Other (See Comments)       Family History       Problem Relation (Age of Onset)    Breast cancer Maternal Aunt, Daughter    Colon cancer Maternal Uncle    Diabetes Father, Mother    Heart attack Father    Hypertension Father, Mother          Tobacco Use    Smoking status: Every Day     Current packs/day: 0.00     Average packs/day: 0.5 packs/day for 30.0 years (15.0  ttl pk-yrs)     Types: Vaping with nicotine, Cigarettes     Start date: 1991     Last attempt to quit: 2021     Years since quittin.7    Smokeless tobacco: Never    Tobacco comments:     Patient is currently smoking 10 cigarettes a day, declines nicotine patches   Substance and Sexual Activity    Alcohol use: Yes     Comment: vodka daily (half a regular bottle) for 4 days    Drug use: Yes     Types: Marijuana     Comment: gummies    Sexual activity: Yes     Birth control/protection: Surgical      Review of Systems   Unable to perform ROS: Mental status change     Objective:     Vital Signs (Most Recent):  Temp: 99 °F (37.2 °C) (23)  Pulse: 108 (23)  Resp: 19 (23)  BP: (!) 160/70 (23)  SpO2: (!) 94 % (23) Vital Signs (24h Range):  Temp:  [97.7 °F (36.5 °C)-99 °F (37.2 °C)] 99 °F (37.2 °C)  Pulse:  [100-126] 108  Resp:  [16-31] 19  SpO2:  [92 %-98 %] 94 %  BP: (129-176)/(57-81) 160/70   Weight: 87.1 kg (192 lb)  Body mass index is 30.99 kg/m².    No intake or output data in the 24 hours ending 23       Physical Exam  Vitals reviewed.   Constitutional:       Appearance: She is well-developed. She is ill-appearing.   HENT:      Head: Normocephalic and atraumatic.   Eyes:      Pupils: Pupils are equal, round, and reactive to light.   Cardiovascular:      Rate and Rhythm: Normal rate and regular rhythm.      Heart sounds: Normal heart sounds.   Pulmonary:      Effort: Pulmonary effort is normal.      Breath sounds: Normal breath sounds.   Abdominal:      General: Bowel sounds are normal.      Palpations: Abdomen is soft.      Tenderness: There is no abdominal tenderness.   Musculoskeletal:      Right lower leg: No edema.      Left lower leg: No edema.   Skin:     General: Skin is warm and dry.   Neurological:      Mental Status: She is easily aroused. She is lethargic.      Comments: Oriented to person only.  Drowsy.             Vents:      Lines/Drains/Airways       Peripheral Intravenous Line  Duration                  Peripheral IV - Single Lumen 11/04/23 1119 22 G Anterior;Right Hand <1 day                  Significant Labs:    CBC/Anemia Profile:  Recent Labs   Lab 11/04/23  1120 11/04/23  1123 11/04/23  1329   WBC 33.05*  --   --    HGB 11.6*  --   --    HCT 39.4 38 35*     --   --    MCV 88  --   --    RDW 17.2*  --   --    IRON 269*  --   --         Chemistries:  Recent Labs   Lab 11/04/23  1319 11/04/23  1841 11/04/23  1845    139  --    K 5.5* 4.5  --     103  --    CO2 16* 22*  --    BUN 31* 24*  --    CREATININE 0.9 0.9  --    CALCIUM 8.9 8.8  --    ALBUMIN 4.3 4.2  --    PROT 7.4 7.0  --    BILITOT 0.4 0.3  --    ALKPHOS 63 57  --    ALT 23 23  --    AST 34 34  --    MG  --   --  1.5*       All pertinent labs within the past 24 hours have been reviewed.    Significant Imaging: I have reviewed all pertinent imaging results/findings within the past 24 hours.

## 2023-11-06 VITALS
TEMPERATURE: 97 F | BODY MASS INDEX: 30.99 KG/M2 | RESPIRATION RATE: 18 BRPM | SYSTOLIC BLOOD PRESSURE: 153 MMHG | HEART RATE: 110 BPM | DIASTOLIC BLOOD PRESSURE: 71 MMHG | OXYGEN SATURATION: 93 % | WEIGHT: 192 LBS

## 2023-11-06 LAB
ALBUMIN SERPL BCP-MCNC: 4.1 G/DL (ref 3.5–5.2)
ALP SERPL-CCNC: 60 U/L (ref 55–135)
ALT SERPL W/O P-5'-P-CCNC: 33 U/L (ref 10–44)
ANION GAP SERPL CALC-SCNC: 9 MMOL/L (ref 8–16)
AST SERPL-CCNC: 37 U/L (ref 10–40)
BASOPHILS # BLD AUTO: 0.02 K/UL (ref 0–0.2)
BASOPHILS NFR BLD: 0.3 % (ref 0–1.9)
BILIRUB SERPL-MCNC: 0.5 MG/DL (ref 0.1–1)
BUN SERPL-MCNC: 7 MG/DL (ref 8–23)
CALCIUM SERPL-MCNC: 9.1 MG/DL (ref 8.7–10.5)
CHLORIDE SERPL-SCNC: 99 MMOL/L (ref 95–110)
CO2 SERPL-SCNC: 30 MMOL/L (ref 23–29)
CREAT SERPL-MCNC: 0.8 MG/DL (ref 0.5–1.4)
DIFFERENTIAL METHOD: ABNORMAL
EOSINOPHIL # BLD AUTO: 0.1 K/UL (ref 0–0.5)
EOSINOPHIL NFR BLD: 1.1 % (ref 0–8)
ERYTHROCYTE [DISTWIDTH] IN BLOOD BY AUTOMATED COUNT: 15.8 % (ref 11.5–14.5)
EST. GFR  (NO RACE VARIABLE): >60 ML/MIN/1.73 M^2
GLUCOSE SERPL-MCNC: 154 MG/DL (ref 70–110)
HCT VFR BLD AUTO: 31.8 % (ref 37–48.5)
HGB BLD-MCNC: 9.9 G/DL (ref 12–16)
HYPOCHROMIA BLD QL SMEAR: ABNORMAL
IMM GRANULOCYTES # BLD AUTO: 0.02 K/UL (ref 0–0.04)
IMM GRANULOCYTES NFR BLD AUTO: 0.3 % (ref 0–0.5)
LYMPHOCYTES # BLD AUTO: 5 K/UL (ref 1–4.8)
LYMPHOCYTES NFR BLD: 71.3 % (ref 18–48)
MAGNESIUM SERPL-MCNC: 1.4 MG/DL (ref 1.6–2.6)
MCH RBC QN AUTO: 26.3 PG (ref 27–31)
MCHC RBC AUTO-ENTMCNC: 31.1 G/DL (ref 32–36)
MCV RBC AUTO: 84 FL (ref 82–98)
MONOCYTES # BLD AUTO: 0.4 K/UL (ref 0.3–1)
MONOCYTES NFR BLD: 5.7 % (ref 4–15)
NEUTROPHILS # BLD AUTO: 1.5 K/UL (ref 1.8–7.7)
NEUTROPHILS NFR BLD: 21.3 % (ref 38–73)
NRBC BLD-RTO: 0 /100 WBC
PATH REPORT.FINAL DX SPEC: NORMAL
PHOSPHATE SERPL-MCNC: 2.9 MG/DL (ref 2.7–4.5)
PLATELET # BLD AUTO: 93 K/UL (ref 150–450)
PLATELET BLD QL SMEAR: ABNORMAL
PMV BLD AUTO: 9.9 FL (ref 9.2–12.9)
POCT GLUCOSE: 154 MG/DL (ref 70–110)
POTASSIUM SERPL-SCNC: 3.6 MMOL/L (ref 3.5–5.1)
PROT SERPL-MCNC: 6.9 G/DL (ref 6–8.4)
RBC # BLD AUTO: 3.77 M/UL (ref 4–5.4)
SIGNING PATHOLOGIST: NORMAL
SODIUM SERPL-SCNC: 138 MMOL/L (ref 136–145)
WBC # BLD AUTO: 7.05 K/UL (ref 3.9–12.7)

## 2023-11-06 PROCEDURE — 25000003 PHARM REV CODE 250

## 2023-11-06 PROCEDURE — 25000242 PHARM REV CODE 250 ALT 637 W/ HCPCS: Performed by: PHYSICIAN ASSISTANT

## 2023-11-06 PROCEDURE — 94761 N-INVAS EAR/PLS OXIMETRY MLT: CPT

## 2023-11-06 PROCEDURE — 63600175 PHARM REV CODE 636 W HCPCS: Performed by: PHYSICIAN ASSISTANT

## 2023-11-06 PROCEDURE — 94640 AIRWAY INHALATION TREATMENT: CPT | Mod: XB

## 2023-11-06 PROCEDURE — 99233 SBSQ HOSP IP/OBS HIGH 50: CPT | Mod: ,,, | Performed by: PSYCHIATRY & NEUROLOGY

## 2023-11-06 PROCEDURE — 20600001 HC STEP DOWN PRIVATE ROOM

## 2023-11-06 PROCEDURE — 63600175 PHARM REV CODE 636 W HCPCS: Performed by: STUDENT IN AN ORGANIZED HEALTH CARE EDUCATION/TRAINING PROGRAM

## 2023-11-06 PROCEDURE — 25000003 PHARM REV CODE 250: Performed by: STUDENT IN AN ORGANIZED HEALTH CARE EDUCATION/TRAINING PROGRAM

## 2023-11-06 PROCEDURE — 27000221 HC OXYGEN, UP TO 24 HOURS

## 2023-11-06 PROCEDURE — 85025 COMPLETE CBC W/AUTO DIFF WBC: CPT | Performed by: STUDENT IN AN ORGANIZED HEALTH CARE EDUCATION/TRAINING PROGRAM

## 2023-11-06 PROCEDURE — 96376 TX/PRO/DX INJ SAME DRUG ADON: CPT

## 2023-11-06 PROCEDURE — 94640 AIRWAY INHALATION TREATMENT: CPT

## 2023-11-06 PROCEDURE — 83735 ASSAY OF MAGNESIUM: CPT | Performed by: STUDENT IN AN ORGANIZED HEALTH CARE EDUCATION/TRAINING PROGRAM

## 2023-11-06 PROCEDURE — 36415 COLL VENOUS BLD VENIPUNCTURE: CPT | Performed by: STUDENT IN AN ORGANIZED HEALTH CARE EDUCATION/TRAINING PROGRAM

## 2023-11-06 PROCEDURE — 80053 COMPREHEN METABOLIC PANEL: CPT | Performed by: STUDENT IN AN ORGANIZED HEALTH CARE EDUCATION/TRAINING PROGRAM

## 2023-11-06 PROCEDURE — 99233 PR SUBSEQUENT HOSPITAL CARE,LEVL III: ICD-10-PCS | Mod: ,,, | Performed by: PSYCHIATRY & NEUROLOGY

## 2023-11-06 PROCEDURE — 99900035 HC TECH TIME PER 15 MIN (STAT)

## 2023-11-06 PROCEDURE — 84100 ASSAY OF PHOSPHORUS: CPT | Performed by: STUDENT IN AN ORGANIZED HEALTH CARE EDUCATION/TRAINING PROGRAM

## 2023-11-06 RX ORDER — TRAZODONE HYDROCHLORIDE 300 MG/1
150 TABLET ORAL NIGHTLY
Qty: 60 TABLET | Refills: 1 | Status: ON HOLD | OUTPATIENT
Start: 2023-11-06 | End: 2023-11-29 | Stop reason: HOSPADM

## 2023-11-06 RX ORDER — DIAZEPAM 5 MG/1
TABLET ORAL
Qty: 7 TABLET | Refills: 0 | Status: ON HOLD | OUTPATIENT
Start: 2023-11-06 | End: 2023-11-24

## 2023-11-06 RX ORDER — FOLIC ACID 1 MG/1
1 TABLET ORAL DAILY
Status: DISCONTINUED | OUTPATIENT
Start: 2023-11-07 | End: 2023-11-06 | Stop reason: HOSPADM

## 2023-11-06 RX ORDER — LANOLIN ALCOHOL/MO/W.PET/CERES
400 CREAM (GRAM) TOPICAL ONCE
Status: COMPLETED | OUTPATIENT
Start: 2023-11-06 | End: 2023-11-06

## 2023-11-06 RX ORDER — MAGNESIUM SULFATE HEPTAHYDRATE 40 MG/ML
4 INJECTION, SOLUTION INTRAVENOUS ONCE
Status: DISCONTINUED | OUTPATIENT
Start: 2023-11-06 | End: 2023-11-06

## 2023-11-06 RX ORDER — OLANZAPINE 10 MG/2ML
5 INJECTION, POWDER, FOR SOLUTION INTRAMUSCULAR ONCE AS NEEDED
Status: DISCONTINUED | OUTPATIENT
Start: 2023-11-06 | End: 2023-11-06

## 2023-11-06 RX ADMIN — HYDROCHLOROTHIAZIDE 25 MG: 25 TABLET ORAL at 08:11

## 2023-11-06 RX ADMIN — ARIPIPRAZOLE 10 MG: 5 TABLET ORAL at 08:11

## 2023-11-06 RX ADMIN — DIAZEPAM 10 MG: 10 INJECTION, SOLUTION INTRAMUSCULAR; INTRAVENOUS at 06:11

## 2023-11-06 RX ADMIN — LOSARTAN POTASSIUM 25 MG: 25 TABLET, FILM COATED ORAL at 08:11

## 2023-11-06 RX ADMIN — FOLIC ACID 1 MG: 5 INJECTION, SOLUTION INTRAMUSCULAR; INTRAVENOUS; SUBCUTANEOUS at 11:11

## 2023-11-06 RX ADMIN — IPRATROPIUM BROMIDE AND ALBUTEROL SULFATE 3 ML: .5; 3 SOLUTION RESPIRATORY (INHALATION) at 01:11

## 2023-11-06 RX ADMIN — ESCITALOPRAM OXALATE 10 MG: 5 TABLET, FILM COATED ORAL at 08:11

## 2023-11-06 RX ADMIN — LEVOTHYROXINE SODIUM 50 MCG: 50 TABLET ORAL at 06:11

## 2023-11-06 RX ADMIN — IPRATROPIUM BROMIDE AND ALBUTEROL SULFATE 3 ML: .5; 3 SOLUTION RESPIRATORY (INHALATION) at 07:11

## 2023-11-06 RX ADMIN — THERA TABS 1 TABLET: TAB at 08:11

## 2023-11-06 RX ADMIN — ENOXAPARIN SODIUM 40 MG: 40 INJECTION SUBCUTANEOUS at 04:11

## 2023-11-06 RX ADMIN — Medication 400 MG: at 09:11

## 2023-11-06 RX ADMIN — PANTOPRAZOLE SODIUM 40 MG: 40 TABLET, DELAYED RELEASE ORAL at 08:11

## 2023-11-06 RX ADMIN — ATORVASTATIN CALCIUM 10 MG: 10 TABLET, FILM COATED ORAL at 08:11

## 2023-11-06 RX ADMIN — THIAMINE HYDROCHLORIDE 250 MG: 100 INJECTION, SOLUTION INTRAMUSCULAR; INTRAVENOUS at 12:11

## 2023-11-06 RX ADMIN — DIAZEPAM 10 MG: 10 INJECTION, SOLUTION INTRAMUSCULAR; INTRAVENOUS at 02:11

## 2023-11-06 RX ADMIN — LORAZEPAM 2 MG: 2 INJECTION INTRAMUSCULAR; INTRAVENOUS at 11:11

## 2023-11-06 NOTE — NURSING TRANSFER
Nursing Transfer Note      11/6/2023   6:45 AM    Reason patient is being transferred: step-down    Transfer To: [id number]      Transfer via wheelchair    Transfer with 2L to O2, cardiac monitoring    Transported by RN, PCT, ad security      Telemetry: Box Number [id number]  Order for Tele Monitor? Yes    Medicines sent: yes      Patient belongings transferred with patient: Yes    Chart send with patient: Yes    Upon arrival to floor: cardiac monitor applied, patient oriented to room, call bell in reach, and bed in lowest position

## 2023-11-06 NOTE — NURSING
Nurses Note -- 4 Eyes      11/6/2023   6:31 AM      Skin assessed during: Transfer      [x] No Altered Skin Integrity Present    []Prevention Measures Documented      [] Yes- Altered Skin Integrity Present or Discovered   [] LDA Added if Not in Epic (Describe Wound)   [] New Altered Skin Integrity was Present on Admit and Documented in LDA   [] Wound Image Taken    Wound Care Consulted? No    Attending Nurse:  Donita Shepherd RN/Staff Member: Aracelis TAYLOR

## 2023-11-06 NOTE — PLAN OF CARE
Problem: Violence Risk or Actual  Goal: Anger and Impulse Control  Outcome: Met     Problem: Adult Inpatient Plan of Care  Goal: Plan of Care Review  Outcome: Met  Goal: Patient-Specific Goal (Individualized)  Outcome: Met  Goal: Absence of Hospital-Acquired Illness or Injury  Outcome: Met  Goal: Optimal Comfort and Wellbeing  Outcome: Met  Goal: Readiness for Transition of Care  Outcome: Met     Problem: Fall Injury Risk  Goal: Absence of Fall and Fall-Related Injury  Outcome: Met     Problem: Restraint, Nonbehavioral (Nonviolent)  Goal: Absence of Harm or Injury  Outcome: Met     Problem: Diabetes Comorbidity  Goal: Blood Glucose Level Within Targeted Range  Outcome: Met     Problem: Skin Injury Risk Increased  Goal: Skin Health and Integrity  Outcome: Met

## 2023-11-06 NOTE — ASSESSMENT & PLAN NOTE
The patient has a significant home medication list and I am unable to verify them at this time  Denying SI HI but lacks insight into the severity of her disease  Psychiatry consulted and on Abilify 10, Lexapro 10 with Trazodone qhs

## 2023-11-06 NOTE — PROGRESS NOTES
"CONSULTATION LIAISON PSYCHIATRY PROGRESS NOTE    Patient Name: Earl Abdul  MRN: [id number]  Patient Class: IP- Inpatient  Admission Date: 11/4/2023  Attending Physician: Andrew Crow MD      SUBJECTIVE:   Earl Abdul is a 63 y.o. female with past psychiatric history of tobacco abuse, alcohol abuse with seizures and complicated withdrawal, and depression with SA in 2017 & past pertinent medical history of breast cancer, HTN, HLD, fibromyalgia, sarcoidosis, hypothyroidism, T2DM, CLL (not on treatment) and COPD  presents to the ED/admitted to the hospital for Alcohol use disorder, severe, dependence. UDS neg,     Psychiatry consulted for "recurrent admissions for alcohol and now in withdrawal; SI"     Today, patient seen  at bedside. Patient alert and orientedx3. Patient states she is feeling fine and ready to go home. When asked about SI and depressive symptoms patient denies and denies ever reporting SI. She denied any side effects from restarting home medications and says she has been on them for years. She stated she did not sleep but said it was 2/2 to being in the hospital. When asked about her plans for treating her substance use when she leaves here she stated she is signed up with the IOP program here and will follow with them. Denies any other questions or concerns.        OBJECTIVE:    Mental Status Exam:  General Appearance: appears stated age, well developed and nourished, adequately groomed and appropriately dressed, in no acute distress  Behavior: normal, cooperative  Involuntary Movements and Motor Activity: no abnormal involuntary movements noted; no tics, no tremors, no akathisia, no dystonia, no evidence of tardive dyskinesia; no psychomotor agitation or retardation  Gait and Station: unable to assess - patient lying down or seated  Speech and Language: intact; normal rate, rhythm, volume, tone, and pitch; conversational, spontaneous, and coherent; speaks and understands English " "proficiently and fluently; repeats words and phrases, no word finding difficulties are noted  Mood: "fine"  Affect: irritable  Thought Process and Associations: intact; linear, goal-directed, organized, and logical; no loosening of associations noted  Thought Content and Perceptions:: no suicidal or homicidal ideation, no auditory or visual hallucinations, no paranoid ideation, no ideas of reference, no evidence of delusions or psychosis  Sensorium and Orientation: intact; alert with clear sensorium; oriented fully to person, place, time and situation  Recent and Remote Memory: grossly intact, able to recall relevant and salient information from the recent and remote past  Attention and Concentration: grossly intact, attentive to the conversation and not readily distractible  Fund of Knowledge: grossly intact  Insight: intact, demonstrates awareness of illness and situation  Judgment: behavior is adequate/appropriate to circumstances    CAM ICU positive? no      ASSESSMENT & RECOMMENDATIONS   Alcohol Use Disorder, severe  Unspecified Depressive Disorder   R/o Delirium     PSYCH MEDICATIONS  Scheduled              - continue Valium taper               - continue Abilify 10 mg daily              - continue Lexapro 10 mg daily              - start Trazodone 200 mg nightly      PRN                                      - continue PRN Ativan 2 mg for anxiety      DELIRIUM  DELIRIUM BEHAVIOR MANAGEMENT  PLEASE utilize CHEMICAL restraints with PRN meds first for agitation. Minimize use of PHYSICAL restraints OR have periods of being out of physical restraints if possible.  Keep window shades open and room lit during day and room dim at night in order to promote normal sleep-wake cycles  Encourage family at bedside. [place] patient often to situation, location, date.  Continue to Limit or Discontinue use of Narcotics, Benzos and Anti-cholinergic medications as they may worsen delirium.  Continue medical workup for causative " etiology of Delirium.         RISK ASSESSMENT  Can DI CONTINUE PEC as patient is not a harm to herself or others and is not gravely disabled.     FOLLOW UP  Spoke with [facility] and patient will follow up with them when she is discharged for substance use treatment      DISPOSITION - once medically cleared:   Defer to medical team    Please contact ON CALL psychiatry service (24/7) for any acute issues that may arise.    Dr. Dahlia ALEJANDRO Psychiatry  [facility]  11/6/2023 12:43 PM     I , Alex Cortes MD, have reviewed the attached history and exam . Pt examined personally by me on 11-6-23  . The case discussed with the examining psychiatry resident physician . I agree the assessment and recommended treatment plan .         --------------------------------------------------------------------------------------------------------------------------------------------------------------------------------------------------------------------------------------    CONTINUED OBJECTIVE clinical data & findings reviewed and noted for above decision making    Current Medications:   Scheduled Meds:    albuterol-ipratropium  3 mL Nebulization Q6H    ARIPiprazole  10 mg Oral Daily    atorvastatin  10 mg Oral Daily    diazePAM  10 mg Intravenous Q8H    enoxparin  40 mg Subcutaneous Daily    EScitalopram oxalate  10 mg Oral Daily    [START ON 11/7/2023] folic acid  1 mg Oral Daily    hydroCHLOROthiazide  25 mg Oral Daily    levothyroxine  50 mcg Oral Before breakfast    losartan  25 mg Oral Daily    multivitamin  1 tablet Oral Daily    pantoprazole  40 mg Oral Daily    thiamine (B-1) 250 mg in dextrose 5 % (D5W) 100 mL IVPB  250 mg Intravenous Daily    Followed by    [START ON 11/7/2023] thiamine  100 mg Oral TID    traZODone  150 mg Oral QHS     PRN Meds: acetaminophen, albuterol-ipratropium, dextrose 10%, dextrose 10%, glucagon (human recombinant), glucose, glucose, lorazepam, naloxone, sodium chloride  0.9%    Allergies:   Review of patient's allergies indicates:   Allergen Reactions    Lortab [hydrocodone-acetaminophen] Itching    Promethazine Itching and Other (See Comments)       Vitals  Vitals:    11/06/23 1057   BP:    Pulse: 106   Resp:    Temp:        Labs/Imaging/Studies:  Recent Results (from the past 24 hour(s))   POCT glucose    Collection Time: 11/06/23  2:31 AM   Result Value Ref Range    POCT Glucose 154 (H) 70 - 110 mg/dL   Magnesium    Collection Time: 11/06/23  5:06 AM   Result Value Ref Range    Magnesium 1.4 (L) 1.6 - 2.6 mg/dL   Phosphorus    Collection Time: 11/06/23  5:06 AM   Result Value Ref Range    Phosphorus 2.9 2.7 - 4.5 mg/dL   CBC with Automated Differential    Collection Time: 11/06/23  5:06 AM   Result Value Ref Range    WBC 7.05 3.90 - 12.70 K/uL    RBC 3.77 (L) 4.00 - 5.40 M/uL    Hemoglobin 9.9 (L) 12.0 - 16.0 g/dL    Hematocrit 31.8 (L) 37.0 - 48.5 %    MCV 84 82 - 98 fL    MCH 26.3 (L) 27.0 - 31.0 pg    MCHC 31.1 (L) 32.0 - 36.0 g/dL    RDW 15.8 (H) 11.5 - 14.5 %    Platelets 93 (L) 150 - 450 K/uL    MPV 9.9 9.2 - 12.9 fL    Immature Granulocytes 0.3 0.0 - 0.5 %    Gran # (ANC) 1.5 (L) 1.8 - 7.7 K/uL    Immature Grans (Abs) 0.02 0.00 - 0.04 K/uL    Lymph # 5.0 (H) 1.0 - 4.8 K/uL    Mono # 0.4 0.3 - 1.0 K/uL    Eos # 0.1 0.0 - 0.5 K/uL    Baso # 0.02 0.00 - 0.20 K/uL    nRBC 0 0 /100 WBC    Gran % 21.3 (L) 38.0 - 73.0 %    Lymph % 71.3 (H) 18.0 - 48.0 %    Mono % 5.7 4.0 - 15.0 %    Eosinophil % 1.1 0.0 - 8.0 %    Basophil % 0.3 0.0 - 1.9 %    Platelet Estimate Decreased (A)     Hypo Occasional     Differential Method Automated    Comprehensive metabolic panel    Collection Time: 11/06/23  8:25 AM   Result Value Ref Range    Sodium 138 136 - 145 mmol/L    Potassium 3.6 3.5 - 5.1 mmol/L    Chloride 99 95 - 110 mmol/L    CO2 30 (H) 23 - 29 mmol/L    Glucose 154 (H) 70 - 110 mg/dL    BUN 7 (L) 8 - 23 mg/dL    Creatinine 0.8 0.5 - 1.4 mg/dL    Calcium 9.1 8.7 - 10.5 mg/dL    Total  Protein 6.9 6.0 - 8.4 g/dL    Albumin 4.1 3.5 - 5.2 g/dL    Total Bilirubin 0.5 0.1 - 1.0 mg/dL    Alkaline Phosphatase 60 55 - 135 U/L    AST 37 10 - 40 U/L    ALT 33 10 - 44 U/L    eGFR >60.0 >60 mL/min/1.73 m^2    Anion Gap 9 8 - 16 mmol/L     Imaging Results              CT Head Without Contrast (Final result)  Result time 11/05/23 11:25:06      Final result by James oStelo DO (11/05/23 11:25:06)                   Impression:      Unremarkable motion distorted CT head as detailed above specifically without evidence for acute intracranial hemorrhage or sulcal effacement to suggest large territory recent infarction.    Clinical correlation and further evaluation as warranted.      Electronically signed by: James Sotelo DO  Date:    11/05/2023  Time:    11:25               Narrative:    EXAMINATION:  CT HEAD WITHOUT CONTRAST    CLINICAL HISTORY:  fall, headache;    TECHNIQUE:  Multiple sequential 5 mm axial images of the head without contrast.  Coronal and sagittal reformatted imaging from the axial acquisition.    COMPARISON:  09/17/2023    FINDINGS:  Study is distorted by motion artifacts despite repeat examination.  Within limits of the study there is no evidence for acute intracranial hemorrhage or sulcal effacement to suggest large territory recent infarction.  Stable hypodensity left inferior basal ganglia suggestive for prominent perivascular space.  Ventricles relatively stable without hydrocephalus.  No midline shift or significant mass effect.  Visualized paranasal sinuses and mastoid air cells are clear.                                       X-Ray Chest AP Portable (Final result)  Result time 11/04/23 12:08:26      Final result by Avani Vera MD (11/04/23 12:08:26)                   Impression:      No acute abnormality or detrimental change since September 18, 2023.      Electronically signed by: Avani Vera MD  Date:    11/04/2023  Time:    12:08               Narrative:     EXAMINATION:  XR CHEST AP PORTABLE    CLINICAL HISTORY:  hypoxic;    TECHNIQUE:  Single frontal view of the chest was performed.    COMPARISON:  September 18, 2023    FINDINGS:  Surgical clips are again identified overlying the chest.The lungs are free of lobar consolidation and alveolar edema and similar to the previous examination, with normal appearance of pulmonary vasculature and no large pleural effusion or pneumothorax.    The cardiac silhouette is normal in size. The hilar and mediastinal contours are unremarkable.    Visualized osseous structures are stable with degenerative changes of the spine and a mild thoracic dextroscoliosis.

## 2023-11-06 NOTE — DISCHARGE SUMMARY
[facility] - Telemetry [facility]  Discharge Summary      Patient Name: Earl Abdul  MRN: [id number]  IZA: [id number]  Patient Class: IP- Inpatient  Admission Date: 11/4/2023  Hospital Length of Stay: 0 days  Discharge Date and Time:  11/06/2023 4:15 PM  Attending Physician: Andrew Crow MD   Discharging Provider: Miguel Bradford MD  Primary Care Provider: Andrew Rodriguez MD  [facility] Medicine Team: [facility] HOSP MED V Miguel Bradford MD  Primary Care Team: [facility] HOSP MED V    HPI:   63-year-old female with history of alcohol use disorder with hx of prior seizures, alcohol withdrawal, depression with suicide attempt 2017, non-insulin dependent DM, COPD, GERD, fibromyalgia, sarcoidosis, breast Ca, CLL (no current treatment), HTN, HLD, hypothyroidism presenting to ED     She presents today acutely intoxicated , last drink, vodka this morning. She states she does not drink every day but that she has been binging since her  left her. She Denies any other substances. Denies SI HI.     She is not aware to the gravity of her situation and she intermittently says she wants to go and then states she will stay throughout the interview    She has significant signs of active withdrawal including significant psychomotor agitation, tachycardia and hypertension    Labs on admission significant for a significant Leukocytosis.     CXR WNL    * No surgery found *      Hospital Course:   Patient stepped up to MICU for worsening agitation in alcohol withdrawal now improved and back to floor.  Started on valium taper with CIWA protocol and PRN IV Ativan.  Psychiatry consulted and recommended PEC.  Patient with history of multiple rehab facilities in past.  On 11/6, discussions with psychiatry and PEC rescinded.  Patient not requiring PRNs for alcohol withdrawal.  Patient discharged with valium taper and close follow up with IOP on 11/7 at 8 am with [facility].       Goals of Care Treatment  Preferences:  Code Status: Full Code    Health care agent: Spouse is KINA  Health care agent number: No value filed.          What is most important right now is to focus on curative/life-prolongation (regardless of treatment burdens).  Accordingly, we have decided that the best plan to meet the patient's goals includes continuing with treatment.      Consults:   Consults (From admission, onward)          Status Ordering Provider     Inpatient consult to Midline team  Once        Provider:  (Not yet assigned)    Completed TATY FISCHER     Inpatient consult to Psychiatry  Once        Provider:  (Not yet assigned)    Completed FIDELIA SOTO     Inpatient consult to Midline team  Once        Provider:  (Not yet assigned)    Completed NI KIDD     Inpatient consult to Critical Care Medicine  Once        Provider:  (Not yet assigned)    Completed FIDELIA SOTO            No new Assessment & Plan notes have been filed under this hospital service since the last note was generated.  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 [D72.820, C91.10] 09/27/2022 Yes    Type 2 diabetes mellitus with hypoglycemia [E11.649] 11/30/2021 Yes    COPD (chronic obstructive pulmonary disease) [J44.9] 04/17/2021 Yes    Malignant neoplasm of central portion of right breast in female, estrogen receptor positive [C50.111, Z17.0] 11/18/2020 Not Applicable    Depression with anxiety [F41.8] 08/16/2017 Yes    Essential hypertension [I10] 02/07/2014 Yes    Tobacco abuse [Z72.0] 02/07/2014 Yes    Alcohol withdrawal [F10.939] 02/07/2014 Yes      Problems Resolved During this Admission:       Discharged Condition: stable    Disposition:     Follow  Up:    Patient Instructions:   No discharge procedures on file.    Significant Diagnostic Studies: Labs: All labs within the past 24 hours have been reviewed    Pending Diagnostic Studies:       None           Medications:  Reconciled Home Medications:      Medication List        CHANGE how you take these medications      ARIPiprazole 10 MG Tab  Commonly known as: ABILIFY  Take 1 tablet by mouth once daily.  What changed: Another medication with the same name was removed. Continue taking this medication, and follow the directions you see here.     diazePAM 5 MG tablet  Commonly known as: VALIUM  Take 2 tablets (10 mg total) by mouth 2 (two) times a day for 1 day, THEN 2 tablets (10 mg total) once daily for 1 day, THEN 1 tablet (5 mg total) once daily for 1 day.  Start taking on: November 6, 2023  What changed: See the new instructions.     traZODone 300 MG tablet  Commonly known as: DESYREL  Take 0.5 tablets (150 mg total) by mouth every evening.  What changed: how much to take            CONTINUE taking these medications      acetaminophen 500 MG tablet  Commonly known as: TYLENOL  Take 500-1,500 mg by mouth daily as needed for Pain.     albuterol 90 mcg/actuation inhaler  Commonly known as: PROVENTIL/VENTOLIN HFA     anastrozole 1 mg Tab  Commonly known as: ARIMIDEX     atorvastatin 10 MG tablet  Commonly known as: LIPITOR  Take 1 tablet by mouth once daily.     ATROVENT HFA 17 mcg/actuation inhaler  Generic drug: ipratropium  Inhale 2 puffs into the lungs every 6 (six) hours as needed.     BREO ELLIPTA 100-25 mcg/dose diskus inhaler  Generic drug: fluticasone furoate-vilanteroL  Inhale 1 puff into the lungs once daily. Controller     busPIRone 15 MG tablet  Commonly known as: BUSPAR     butalbital-acetaminophen-caffeine -40 mg -40 mg per tablet  Commonly known as: FIORICET, ESGIC  Take 1 tablet by mouth every 4 (four) hours as needed.     cholecalciferol (vitamin D3) 50 mcg (2,000 unit) Cap  capsule  Commonly known as: VITAMIN D3  TAKE 1 CAPSULE BY MOUTH ONCE A DAY IN THE MORNING     cloNIDine 0.1 MG tablet  Commonly known as: CATAPRES  Take by mouth.     dicyclomine 20 mg tablet  Commonly known as: BENTYL  Take 20 mg by mouth 4 (four) times daily.     doxepin 150 MG Cap  Commonly known as: SINEQUAN  Take 150 mg by mouth every evening.     DULoxetine 60 MG capsule  Commonly known as: CYMBALTA  Take 60 mg by mouth.     EScitalopram oxalate 10 MG tablet  Commonly known as: LEXAPRO  Take 10 mg by mouth once daily.     FLUoxetine 20 MG capsule  Take 3 capsules by mouth once daily.     hydroCHLOROthiazide 25 MG tablet  Commonly known as: HYDRODIURIL     hydrOXYzine pamoate 50 MG Cap  Commonly known as: VISTARIL  TAKE ONE CAPSULE BY MOUTH EVERY 6 HOURS AS NEEDED FOR ANXIETY AND/OR EVERY NIGHT AT BEDTIME AS NEEDED FOR INSOMNIA     letrozole 2.5 mg Tab  Commonly known as: FEMARA     levothyroxine 50 MCG tablet  Commonly known as: SYNTHROID  TAKE 1 TABLET(50 MCG) BY MOUTH BEFORE BREAKFAST     loperamide 2 mg capsule  Commonly known as: IMODIUM  Take 2 mg by mouth 4 (four) times daily as needed for Diarrhea (Per package directions).     losartan 25 MG tablet  Commonly known as: COZAAR  Take 1 tablet by mouth once daily.     melatonin  Commonly known as: MELATIN  Take by mouth nightly as needed for Insomnia.     metFORMIN 500 MG ER 24hr tablet  Commonly known as: GLUCOPHAGE-XR  Take 2 tablets (1,000 mg total) by mouth 2 (two) times daily with meals.     metoprolol succinate 50 MG 24 hr tablet  Commonly known as: TOPROL-XL     mirtazapine 30 MG tablet  Commonly known as: REMERON     multivitamin Tab  Take 1 tablet by mouth once daily.     nabumetone 500 MG tablet  Commonly known as: RELAFEN  Take 500 mg by mouth 2 (two) times daily.     ondansetron 4 MG Tbdl  Commonly known as: ZOFRAN-ODT  Take 1 tablet (4 mg total) by mouth every 6 (six) hours as needed (nausea/vomiting).     pantoprazole 40 MG tablet  Commonly  known as: PROTONIX  Take 40 mg by mouth once daily.     propranoloL 20 MG tablet  Commonly known as: INDERAL  Take 20 mg by mouth 2 (two) times daily.     thiamine 100 MG tablet  Take 1 tablet (100 mg total) by mouth once daily.            STOP taking these medications      gabapentin 600 MG tablet  Commonly known as: NEURONTIN     lisinopriL 20 MG tablet  Commonly known as: PRINIVIL,ZESTRIL            ASK your doctor about these medications      folic acid 1 MG tablet  Commonly known as: FOLVITE  Take 1 tablet (1 mg total) by mouth once daily.              Indwelling Lines/Drains at time of discharge:   Lines/Drains/Airways       None                   Time spent on the discharge of patient: 37 minutes         Miguel Bradford MD  Department of Hospital Medicine  [facility] - Telemetry Stepdown

## 2023-11-06 NOTE — ASSESSMENT & PLAN NOTE
See Notes for history    Given she is a known CLL patient and follows in clinic.  Consider hematology consult, improving WBC

## 2023-11-06 NOTE — NURSING
Pt disoriented to time with abc's intact. Pt lying in bed with 1:1 sitter at beside. All pt belongings removed and given to charge nurse. Pt place in paper scrubs. Bed at lowest, locked, sr up x 3 and board updated. Pt sitter instructed to call for assistance. Will cont poc       Shirt  Pants  [place]  1 ring  1 chain  1 watch  1 phone

## 2023-11-06 NOTE — NURSING
after given ativan pt walked out room trying to use the phone and wasn't redirectable. pt was screaming and slammed phone on desk.  Pt is now back in room lying in bed with 1:1 sitter at bedside . Md notified. Pt/sitter instructed to call for assistance. Will cont poc

## 2023-11-06 NOTE — HOSPITAL COURSE
Patient stepped up to MICU for worsening agitation in alcohol withdrawal now improved and back to floor.  Started on valium taper with CIWA protocol and PRN IV Ativan.  Psychiatry consulted and recommended PEC.  Patient with history of multiple rehab facilities in past.  On 11/6, discussions with psychiatry and PEC rescinded.  Patient not requiring PRNs for alcohol withdrawal.  Patient discharged with valium taper and close follow up with IOP on 11/7 at 8 am with [facility].

## 2023-11-06 NOTE — ASSESSMENT & PLAN NOTE
The patient is presenting with the signs and syptoms of acute alcohol intoxication and subsequent withdrawal    CIWA protocol  Thiamine  Folate  Valium TID with plan for taper, PRNs in place  F/u psych, appreciate assistance, PEC'd  Monitor vitals   Telemetry  Consider Naltrexone initiation at discharge  Alcohol cessation counseling  Seizure precautions

## 2023-11-06 NOTE — SUBJECTIVE & OBJECTIVE
Interval History: Patient with difficult IV access.  Agitated this am and anxious wanting to leave hospital.  Discussed with patient about PEC.    Review of Systems   Unable to perform ROS: Mental status change   Psychiatric/Behavioral:  Positive for agitation. Negative for hallucinations and suicidal ideas. The patient is nervous/anxious.      Objective:     Vital Signs (Most Recent):  Temp: 98.6 °F (37 °C) (11/06/23 0742)  Pulse: 106 (11/06/23 1057)  Resp: 20 (11/06/23 0742)  BP: 124/68 (11/06/23 0742)  SpO2: (!) 93 % (11/06/23 0742) Vital Signs (24h Range):  Temp:  [97.1 °F (36.2 °C)-98.6 °F (37 °C)] 98.6 °F (37 °C)  Pulse:  [] 106  Resp:  [16-31] 20  SpO2:  [91 %-99 %] 93 %  BP: (114-177)/(55-78) 124/68     Weight: 87.1 kg (192 lb)  Body mass index is 30.99 kg/m².    Intake/Output Summary (Last 24 hours) at 11/6/2023 1102  Last data filed at 11/6/2023 0200  Gross per 24 hour   Intake 1000 ml   Output --   Net 1000 ml         Physical Exam  Vitals and nursing note reviewed.   Constitutional:       Appearance: She is ill-appearing.   HENT:      Head: Normocephalic and atraumatic.   Eyes:      Conjunctiva/sclera: Conjunctivae normal.      Pupils: Pupils are equal, round, and reactive to light.   Cardiovascular:      Rate and Rhythm: Normal rate and regular rhythm.      Heart sounds: Normal heart sounds.   Pulmonary:      Effort: Pulmonary effort is normal.      Breath sounds: Normal breath sounds. No wheezing.   Abdominal:      General: Abdomen is flat. There is no distension.      Palpations: Abdomen is soft.      Tenderness: There is no abdominal tenderness.   Musculoskeletal:      Cervical back: Neck supple.      Right lower leg: No edema.      Left lower leg: No edema.   Skin:     General: Skin is warm and dry.   Neurological:      Mental Status: She is easily aroused. She is disoriented.   Psychiatric:         Mood and Affect: Mood is anxious.         Speech: Speech normal.         Behavior: Behavior is  agitated. Behavior is cooperative.         Thought Content: Thought content does not include homicidal or suicidal ideation.             Significant Labs: All pertinent labs within the past 24 hours have been reviewed.    Significant Imaging: I have reviewed all pertinent imaging results/findings within the past 24 hours.

## 2023-11-06 NOTE — PLAN OF CARE
MICU DAILY GOALS     Family/Goals of care/Code Status   Code Status: Full Code    24H Vital Sign Range  Temp:  [98.4 °F (36.9 °C)-99.1 °F (37.3 °C)]   Pulse:  []   Resp:  [16-28]   BP: (114-177)/(55-97)   SpO2:  [91 %-98 %]      Shift Events   No acute events throughout shift    AWAKE RASS: Goal -    Actual -      Restraint necessity: Not necessary   BREATHE SBT: Not intubated    Coordinate A & B, analgesics/sedatives Pain: managed   SAT: Not intubated   Delirium CAM-ICU: Overall CAM-ICU: Negative   Early(intubated/ Progressive (non-intubated) Mobility MOVE Screen (INTUBATED ONLY): Not intubated    Activity: Activity Management: Rolling - L1   Feeding/Nutrition Diet order: Diet/Nutrition Received: regular,     Thrombus DVT prophylaxis: VTE Required Core Measure: Pharmacological prophylaxis initiated/maintained   HOB Elevation Head of Bed (HOB) Positioning: HOB at 30 degrees   Ulcer Prophylaxis GI: yes   Glucose control managed     Skin Skin assessed during: Q Shift Change    Sacrum intact? Yes  Heels intact? Yes  Surgical wound? No    [x] No Altered Skin Integrity Present    []Prevention Measures Documented    [] Altered Skin Integrity Present or Discovered   [] LDA present /added in EPIC   [] Wound Image Taken     Wound Care Consulted? No    Attending Nurse:  Aracelis Shepherd RN/Staff Member:  CLAUDIA Hsu and CLAUDIA Delgado   Bowel Function no issues    Indwelling Catheter Necessity            De-escalation Antibiotics Yes       VS and assessment per flow sheet, patient progressing towards goals as tolerated, plan of care reviewed with [unfilled] and family, all concerns addressed, will continue to monitor.

## 2023-11-06 NOTE — PLAN OF CARE
"[facility] - Telemetry Stepdown  Initial Discharge Assessment       Primary Care Provider: Andrew Rodriguez MD    Admission Diagnosis: Agitation [R45.1]  Intoxication by drug [F19.929]  Chest pain [R07.9]    Admission Date: 11/4/2023  Expected Discharge Date: 11/8/2023    Transition of Care Barriers: Substance Abuse    Payor: [place] [employer] / Plan: [facility] ALL SAVERS / Product Type: Commercial /     Extended Emergency Contact Information  Primary Emergency Contact: Henrique Abdul  Address: [address] [place] [address] [place] [id number] [facility]  Home Phone: [phone]  Relation: Spouse  Secondary Emergency Contact: Carlos Suazo  Mobile Phone: [phone]  Relation: Son    Discharge Plan A: Home with family, Other (Substance use IOP)  Discharge Plan B: Other (IP Substance Abuse Rehab)      [id number] - [place] - [phone] AT [facility]  [address]  [facility] [address]  Phone: [phone] Fax: [phone]      Initial Assessment (most recent)       Adult Discharge Assessment - 11/06/23 1212          Discharge Assessment    Assessment Type Discharge Planning Assessment     Confirmed/corrected address, phone number and insurance Yes     Confirmed Demographics Correct on Facesheet     Source of Information patient     When was your last doctors appointment? 09/11/23   per pt "a month ago"    Does patient/caregiver understand observation status No     Was observation education provided? No   Pt became agitated when attempted    Communicated BECCA with patient/caregiver No     People in Home spouse     Do you expect to return to your current living situation? Yes     Do you have help at home or someone to help you manage your care at home? Yes     Who are your caregiver(s) and their phone number(s)? Henrique Espananancy () - [phone]     Prior to hospitilization cognitive status: Alert/Oriented     Current cognitive status: Alert/Oriented  "    Home Accessibility stairs within home;not wheelchair accessible     Equipment Currently Used at Home oxygen   2L at night only    Readmission within 30 days? No     Patient currently being followed by outpatient case management? No     Do you currently have service(s) that help you manage your care at home? No     Do you take prescription medications? Yes     Do you have prescription coverage? Yes     Do you have any problems affording any of your prescribed medications? No     Is the patient taking medications as prescribed? yes     Who is going to help you get home at discharge? Family     How do you get to doctors appointments? car, drives self     Are you on dialysis? No     Do you take coumadin? No     DME Needed Upon Discharge  none     Discharge Plan discussed with: Patient     Transition of Care Barriers Substance Abuse     Discharge Plan A Home with family;Other   Substance use IOP    Discharge Plan B Other   IP Substance Abuse Rehab       OTHER    Name(s) of People in Home Henrique Abdul ()                   Pt reports living with . Pt stated that she recently completed 30 day IP Substance use rehab and was scheduled to begin IOP today (11/6). Pt stated that she uses Oxygen at night only. Pt stated that she does not have any HH, dialysis, or coumadin. Pt stated that family can transport at d/c.     Pt asked SW to contact Raman HAAS at [facility] to inform him of Pt's current admission.    Pt became agitated when SW asked Pt about her current BECCA.     Plan A) Home w/ family; IOP    Plan B) IP Substance use rehab    Discharge Plan A and Plan B have been determined by review of patient's clinical status, future medical and therapeutic needs, and coverage/benefits for post-acute care in coordination with multidisciplinary team members.     Shirley Gamino LMSW  Case Management [facility]  [phone]                 [phone]

## 2023-11-06 NOTE — PROGRESS NOTES
[facility] - Telemetry [facility]  Progress Note    Patient Name: Earl Abdul  MRN: [id number]  Patient Class: IP- Inpatient   Admission Date: 11/4/2023  Length of Stay: 0 days  Attending Physician: Andrew Crow MD  Primary Care Provider: Andrew Rodriguez MD        Subjective:     Principal Problem:Alcohol use disorder, severe, dependence        HPI:  63-year-old female with history of alcohol use disorder with hx of prior seizures, alcohol withdrawal, depression with suicide attempt 2017, non-insulin dependent DM, COPD, GERD, fibromyalgia, sarcoidosis, breast Ca, CLL (no current treatment), HTN, HLD, hypothyroidism presenting to ED     She presents today acutely intoxicated , last drink, vodka this morning. She states she does not drink every day but that she has been binging since her  left her. She Denies any other substances. Denies SI HI.     She is not aware to the gravity of her situation and she intermittently says she wants to go and then states she will stay throughout the interview    She has significant signs of active withdrawal including significant psychomotor agitation, tachycardia and hypertension    Labs on admission significant for a significant Leukocytosis.     CXR WNL    Overview/Hospital Course:  Patient stepped up to MICU for worsening agitation in alcohol withdrawal now improved and back to floor.  Started on valium taper with CIWA protocol and PRN IV Ativan.  Psychiatry consulted and recommended PEC.   updated and understanding of plan with concern of continued relapse from multiple rehabs in past.    Interval History: Patient with difficult IV access.  Agitated this am and anxious wanting to leave hospital.  Discussed with patient about PEC.    Review of Systems   Unable to perform ROS: Mental status change   Psychiatric/Behavioral:  Positive for agitation. Negative for hallucinations and suicidal ideas. The patient is nervous/anxious.       Objective:     Vital Signs (Most Recent):  Temp: 98.6 °F (37 °C) (11/06/23 0742)  Pulse: 106 (11/06/23 1057)  Resp: 20 (11/06/23 0742)  BP: 124/68 (11/06/23 0742)  SpO2: (!) 93 % (11/06/23 0742) Vital Signs (24h Range):  Temp:  [97.1 °F (36.2 °C)-98.6 °F (37 °C)] 98.6 °F (37 °C)  Pulse:  [] 106  Resp:  [16-31] 20  SpO2:  [91 %-99 %] 93 %  BP: (114-177)/(55-78) 124/68     Weight: 87.1 kg (192 lb)  Body mass index is 30.99 kg/m².    Intake/Output Summary (Last 24 hours) at 11/6/2023 1102  Last data filed at 11/6/2023 0200  Gross per 24 hour   Intake 1000 ml   Output --   Net 1000 ml         Physical Exam  Vitals and nursing note reviewed.   Constitutional:       Appearance: She is ill-appearing.   HENT:      Head: Normocephalic and atraumatic.   Eyes:      Conjunctiva/sclera: Conjunctivae normal.      Pupils: Pupils are equal, round, and reactive to light.   Cardiovascular:      Rate and Rhythm: Normal rate and regular rhythm.      Heart sounds: Normal heart sounds.   Pulmonary:      Effort: Pulmonary effort is normal.      Breath sounds: Normal breath sounds. No wheezing.   Abdominal:      General: Abdomen is flat. There is no distension.      Palpations: Abdomen is soft.      Tenderness: There is no abdominal tenderness.   Musculoskeletal:      Cervical back: Neck supple.      Right lower leg: No edema.      Left lower leg: No edema.   Skin:     General: Skin is warm and dry.   Neurological:      Mental Status: She is easily aroused. She is disoriented.   Psychiatric:         Mood and Affect: Mood is anxious.         Speech: Speech normal.         Behavior: Behavior is agitated. Behavior is cooperative.         Thought Content: Thought content does not include homicidal or suicidal ideation.             Significant Labs: All pertinent labs within the past 24 hours have been reviewed.    Significant Imaging: I have reviewed all pertinent imaging results/findings within the past 24 hours.    Assessment/Plan:       * Alcohol use disorder, severe, dependence  The patient is presenting with the signs and syptoms of acute alcohol intoxication and subsequent withdrawal    CIWA protocol  Thiamine  Folate  Valium TID with plan for taper, PRNs in place  F/u psych, appreciate assistance, PEC'd  Monitor vitals   Telemetry  Consider Naltrexone initiation at discharge  Alcohol cessation counseling  Seizure precautions      CLL (chronic lymphocytic leukemia)  See Notes for history    Given she is a known CLL patient and follows in clinic.  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    COPD (chronic obstructive pulmonary disease)  Home meds  Does not appear to be in acute respiratory distress    Malignant neoplasm of central portion of right breast in female, estrogen receptor positive  See oncology notes for history      Depression with anxiety  The patient has a significant home medication list and I am unable to verify them at this time  Denying SI HI but lacks insight into the severity of her disease  Psychiatry consulted and on Abilify 10, Lexapro 10 with Trazodone qhs        History of substance abuse    See alcohol withdrawal    Sarcoidosis  Daily labs      Tobacco abuse  Smoking cessation when able      Fibromyalgia  Pain control as needed      Essential hypertension  Chronic, uncontrolled. Latest blood pressure and vitals reviewed-     Temp:  [97.1 °F (36.2 °C)-98.6 °F (37 °C)]   Pulse:  []   Resp:  [16-31]   BP: (114-177)/(55-78)   SpO2:  [91 %-99 %] .   Home meds for hypertension were reviewed and noted below.   Hypertension Medications               cloNIDine (CATAPRES) 0.1 MG tablet Take by mouth.    hydroCHLOROthiazide (HYDRODIURIL) 25 MG tablet     lisinopriL (PRINIVIL,ZESTRIL) 20 MG  tablet Take 1 tablet (20 mg total) by mouth once daily.    losartan (COZAAR) 25 MG tablet Take 1 tablet by mouth once daily.    metoprolol succinate (TOPROL-XL) 50 MG 24 hr tablet     propranoloL (INDERAL) 20 MG tablet Take 20 mg by mouth 2 (two) times daily.            While in the hospital, will manage blood pressure as follows; Continue home antihypertensive regimen  Home meds      VTE Risk Mitigation (From admission, onward)           Ordered     enoxaparin injection 40 mg  Daily         11/04/23 1358     IP VTE HIGH RISK PATIENT  Once         11/04/23 1358     Place sequential compression device  Until discontinued         11/04/23 1358                    Discharge Planning   BECCA: 11/8/2023     Code Status: Full Code   Is the patient medically ready for discharge?: No    Reason for patient still in hospital (select all that apply): Patient trending condition                     Miguel Bradford MD  Department of Hospital Medicine   [facility] - Tele[facility] Stepdown

## 2023-11-06 NOTE — ASSESSMENT & PLAN NOTE
Chronic, uncontrolled. Latest blood pressure and vitals reviewed-     Temp:  [97.1 °F (36.2 °C)-98.6 °F (37 °C)]   Pulse:  []   Resp:  [16-31]   BP: (114-177)/(55-78)   SpO2:  [91 %-99 %] .   Home meds for hypertension were reviewed and noted below.   Hypertension Medications               cloNIDine (CATAPRES) 0.1 MG tablet Take by mouth.    hydroCHLOROthiazide (HYDRODIURIL) 25 MG tablet     lisinopriL (PRINIVIL,ZESTRIL) 20 MG tablet Take 1 tablet (20 mg total) by mouth once daily.    losartan (COZAAR) 25 MG tablet Take 1 tablet by mouth once daily.    metoprolol succinate (TOPROL-XL) 50 MG 24 hr tablet     propranoloL (INDERAL) 20 MG tablet Take 20 mg by mouth 2 (two) times daily.            While in the hospital, will manage blood pressure as follows; Continue home antihypertensive regimen  Home meds

## 2023-11-07 ENCOUNTER — PATIENT MESSAGE (OUTPATIENT)
Dept: HEMATOLOGY/ONCOLOGY | Facility: CLINIC | Age: 63
End: 2023-11-07
Payer: COMMERCIAL

## 2023-11-07 LAB
BCLL FINAL DIAGNOSIS: NORMAL
BCLL RESULT: NORMAL
BCLL SPECIMEN TYPE: NORMAL

## 2023-11-07 NOTE — NURSING
Pt iv/telebox removed without complication. Meds picked up from pharmacy and at pt bedside. All pt belongings given to pt and at bedside. Discharge paperwork and instructions given. All questions answered. Pt refused pt transport. Pt states that son is on his way. Pt ambulating off floor

## 2023-11-07 NOTE — PLAN OF CARE
[facility] - Telemetry Stepdown  Discharge Final Note    Primary Care Provider: Andrew Rodriguez MD    Expected Discharge Date: 11/6/2023    Final Discharge Note (most recent)       Final Note - 11/07/23 0919          Final Note    Assessment Type Final Discharge Note     Anticipated Discharge Disposition Home or Self Care     Hospital Resources/Appts/Education Provided Appointments scheduled and added to AVS;Community resources provided        Post-Acute Status    Post-Acute Authorization Other     Other Status Community Services     Discharge Delays None known at this time                   Future Appointments   Date Time Provider Department Center   11/27/2023 11:20 AM Andrew Rodriguez MD [facility]   5/1/2024  8:30 AM [facility] Vahid German LCSW [facility]  Ext. [phone]

## 2023-11-08 ENCOUNTER — PATIENT OUTREACH (OUTPATIENT)
Dept: ADMINISTRATIVE | Facility: CLINIC | Age: 63
End: 2023-11-08
Payer: COMMERCIAL

## 2023-11-08 ENCOUNTER — PATIENT MESSAGE (OUTPATIENT)
Dept: HEMATOLOGY/ONCOLOGY | Facility: CLINIC | Age: 63
End: 2023-11-08
Payer: COMMERCIAL

## 2023-11-08 ENCOUNTER — HOSPITAL ENCOUNTER (OUTPATIENT)
Dept: PSYCHIATRY | Facility: HOSPITAL | Age: 63
Discharge: HOME | End: 2023-11-08
Attending: STUDENT IN AN ORGANIZED HEALTH CARE EDUCATION/TRAINING PROGRAM
Payer: COMMERCIAL

## 2023-11-08 VITALS
SYSTOLIC BLOOD PRESSURE: 112 MMHG | TEMPERATURE: 97 F | RESPIRATION RATE: 18 BRPM | HEART RATE: 109 BPM | DIASTOLIC BLOOD PRESSURE: 54 MMHG

## 2023-11-08 DIAGNOSIS — F10.930 ALCOHOL WITHDRAWAL SYNDROME WITHOUT COMPLICATION: ICD-10-CM

## 2023-11-08 DIAGNOSIS — F10.20 ALCOHOL USE DISORDER, SEVERE, DEPENDENCE: Primary | ICD-10-CM

## 2023-11-08 DIAGNOSIS — F32.89 OTHER DEPRESSION: ICD-10-CM

## 2023-11-08 DIAGNOSIS — F10.20 ALCOHOL USE DISORDER, SEVERE, DEPENDENCE: Primary | Chronic | ICD-10-CM

## 2023-11-08 PROCEDURE — 99222 PR INITIAL HOSPITAL CARE,LEVL II: ICD-10-PCS | Mod: ,,, | Performed by: PSYCHIATRY & NEUROLOGY

## 2023-11-08 PROCEDURE — 90853 GROUP PSYCHOTHERAPY: CPT

## 2023-11-08 PROCEDURE — 90853 GROUP PSYCHOTHERAPY: CPT | Mod: ,,, | Performed by: PSYCHOLOGIST

## 2023-11-08 PROCEDURE — 90853 PR GROUP PSYCHOTHERAPY: ICD-10-PCS | Mod: ,,, | Performed by: PSYCHOLOGIST

## 2023-11-08 PROCEDURE — 90853 GROUP PSYCHOTHERAPY: CPT | Performed by: SOCIAL WORKER

## 2023-11-08 PROCEDURE — 99222 1ST HOSP IP/OBS MODERATE 55: CPT | Mod: ,,, | Performed by: PSYCHIATRY & NEUROLOGY

## 2023-11-08 NOTE — PROGRESS NOTES
INITIAL VISIT: PSYCHIATRY  [facility]      ASSESSMENT AND PLAN:     DIAGNOSES & PROBLEMS:  Alcohol use disorder, severe  Unspecified depressive disorder  Unspecified anxiety    In Summary:  Earl Abdul is a 63 y.o. female with past psychiatric history of tobacco abuse, alcohol abuse with seizures and complicated withdrawal, and depression with SA in 2017 & past pertinent medical history of breast cancer, HTN, HLD, fibromyalgia, sarcoidosis, hypothyroidism, T2DM, CLL (not on treatment) and COPD  presents  to ABU for Alcohol use disorder, severe, dependence.   Pt has had several hospital admissions for alcohol withdrawal, and has a hx of complicated withdrawal, including seizures, hallucinations. Has been to residential rehab several times, attended AA meetings, completed IOP in the past.   Most recent hospital admission was 11/4/23-11/6/23; see above. Pt had BAL of 211 on ED presentation.   Discharged from hospital on 11/6/23 on Valium taper. Last drink was 11/7/23 evening and took Valium 10mg this morning.     Plan:  - Last drink: 11/7/23  - Continue Valium taper (as per 11/6/23 hospital discharge instructions; took 10mg once today, will take 5mg once tomorrow, then stop)   - Discussed not driving while taking Valium  - Reviewed psychotropic regimen:   - Lexapro 10mg daily, Abilify 10mg daily, Trazodone 150mg qhs   - Per pt, she hasn't been prescribed/taking Lexapro (was given while hospitalized); will reassess and consider starting/restarting for depressive sx and anxiety  - Admit to ORP for IOP level of care; given hx, may need higher level of care  -Discussed withdrawal pre-cautions to go to ED (hallucinations, high blood pressure, severe tremors, etc.), stop driving, record blood pressure three times daily (pt now out of withdrawal window)  - Follow up: UTox, alcohol biomarkers, PETH, CMP  - Discussed with pt's , who is in agreement with plan     In cases of emergency, daily  coverage provided by Acute/ER Psych MD, NP, PA, or SW, with contact numbers located in [facility] On Call Schedule.     [x]  Admit to the ORP.  [x]  Initiate patient on ORP protocol.  [x]  Additional workup planned to address substance use disorder, in order to guide and refine ongoing management options, includes serial alcohol and drug laboratory testing (e.g. PETH, POCT breath alcohol test, urine toxicology, alcohol biomarkers).  [x]  Full engagement in 12 step (or equivalent) recovery program(s), including mandatory meeting attendance and acquisition of sponsor.  [x]  Patient counseled on abstinence from alcohol and substances of abuse (illicit and prescription).  [x]  Relapse prevention and motivational interviewing provided.     III[x]III  PRESCRIPTION DRUG MANAGEMENT:      Prescription Drug Management entails the review, recommendation, or consideration without recommendation of medications, and as such was employed during the encounter.     Discussed, to the extent possible, diagnosis, risks and benefits of proposed treatment vs alternative treatments vs no treatment, potential side effects of these treatments and the inherent unpredictability of treatment. The patient's ability to understand, participate and engage in a conversation surrounding this was deemed to be: adequate.     ADDICTION COUNSELING AND MANAGEMENT:      Timely and targeted counseling is an important intervention in the treatment of substance use disorders.  Active and ongoing management is a hallmark of good clinical care.     Addiction counseling and management WAS employed during this encounter.     [x] The patient was counseled on abstinence from alcohol and substances of abuse (illicit and prescription).  [x] Harm reduction techniques were discussed, as warranted, to mitigate risk from problematic behaviors.  [x] Serial alcohol and drug laboratory testing (e.g. PETH, urine toxicology) is recommended to provide  "accountability, as well as to guide and refine substance abuse treatment moving forward.  [x] Relapse prevention and motivational interviewing was provided.  [x] Education was provided on 12 step recovery programs.       PRESENTATION:     Earl Abdul presents with the following chief complaint: depression, anxiety, and alcohol and/or drug addiction    Earl Abdul is a 63 y.o. female with past psychiatric history of tobacco abuse, alcohol abuse with seizures and complicated withdrawal, and depression with SA in 2017 & past pertinent medical history of breast cancer, HTN, HLD, fibromyalgia, sarcoidosis, hypothyroidism, T2DM, CLL (not on treatment) and COPD  presents  to ABU for Alcohol use disorder, severe, dependence.     Per Chart:  11/4/23 admitted for alcohol withdrawals; PEC'd, seen by [facility] psychiat[facility] for  "recurrent admissions for alcohol and now in withdrawal; SI." psych rec'd to enroll in IOP after discharge (pt endorsed interest in [facility]).   9/16/23 admitted for alcohol withdrawals  7/17/23 admitted for alcohol withdrawals; psych rec'd to enroll in rehab after discharge  5/15/23 admitted for alcohol withdrawals; psych rec'd to enroll in rehab after discharge  4/25/23 admitted for alcohol withdrawals; psych rec'd to enroll in rehab after discharge    Pt has had several hospital admissions for alcohol withdrawal, and has a hx of complicated withdrawal, including seizures, hallucinations. Has been to residential rehab several times, attended AA meetings, completed IOP in the past.   Most recent hospital admission was 11/4/23-11/6/23; see above. Pt had BAL of 211 on ED presentation.   During this admission, pt received:  11/4/23: Librium 100mg x 1, Librium 50mg x 1, IV Ativan 2mg x 5, IV Ativan 1mg x 1, Droperidol 0.625mg x 1, IV Haldol 2.5mg x 2, IM Geodon 20mg x 1  11/5/23: IV Valium 10mg (scheduled q8h) x 3, IV Ativan 2mg (PRN) x 1  11/6/23:  IV Valium 10mg (scheduled q8h) x 2, IV Ativan 2mg " "(PRN) x 1    Pt was discharged with Valium taper on 11/6/23; directions were: Take 10mg BID x 1 day, Take 10mg once daily x 1 day, Take 5mg once daily x 1 day, then stop.   In addition, she was told to continue home Lexapro 10mg daily, home Abilify 10mg daily, and home Trazodone 150mg nightly.     Per Patient:  On interview, pt was noted to be drowsy, but oriented x 4. She endorsed feeling "sleepy, jittery, lightheaded; I'm detoxing." Had to have several questions repeated/restated and thought process was tangential at times.   Stated that she started feeling this way this morning and feels it's due to the Valium she was discharged with (stated she took "two (5 mg tablets) this morning"). Unable to recall what brought her to the ED on 11/4/23. Stated that she was drinking only socially up until her son passed away a few years ago, at which point she started drinking significantly larger amounts and more frequently. Up until hospital admission on 11/4/23, she endorsed drinking "a bottle of vodka a day." Stated that her last drink was yesterday evening (11/7); initially stated she had "a couple of drinks," but then stated she had "16 oz of vodka." When asked if she had anything to drink on the day of hospital discharge (11/6), she stated "I don't remember; probably." Stated that she recently completed a residential rehab program in [place]. Endorsed going to AA meetings on-and-off, but doesn't have a sponsor; stated she feels more comfortable with [facility]. Has been on MAT in the past (naltrexone). Hasn't had prolonged periods of sobriety since she started drinking more after her son's passing (other than during the times she was hospitalized or in inpatient rehab). Endorsed having 1 seizure years ago; denied hx of DT's.   Endorsed feeling depressed; feels her mood has been down since her son's passing, and has been exacerbated by intimacy issues with her . 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 Feels tired, with decreased motivation. Endorsed anxiety, although unable to elaborate. Stated that her sleep and appetite have been "pretty good." Denied SI/HI/AVH.   Stated that she doesn't currently have an outpatient psychiatrist, and that her psychiatric meds have been prescribed by her PCP. She listed her psychiatric meds as: Abilify and Trazodone (unable to recall being prescribed Lexapro, even though it's listed in chart and was given to her during this recent admission, and stated she hasn't been taking it). Has a therapist she follows up with.   Initially stated that she lives at home with her , but later brought up that her " left (her) because of (her) drinking." Stated that her  has been staying at a hotel, but they "keep in touch and he knows (she's) here."   Per 11/5/23 [facility] psychiat[facility] consult note: "Mentions that her  left her the day prior to admission. Denies  leaving her before, despite this being mentioned in previous admissions. Says she believes  "doesn't want to be  to an alcoholic."       Collateral:   Henrique Abdul (Spouse)  [phone] (Cell Phone)       Called and spoke to pt's , Mr. Abdul. He states that pt has been dealing with "chronic alcohol use for 10 years, and for the past 3 years, it's been very steady; going in and out of rehab, not following through with the discharge plan."   He states that he's set up some firm boundaries in regards to their relationship; states that he's temporarily moved out to stay in a hotel multiple times due to her continued alcohol use. Most recently moved out a week before her recent hospitalization, but just came back to the house after she was discharged home on Monday. States that he wants to set up [employer] remote alcohol monitoring, which can sync to his phone; he's discussed having her use [employer] every day and attend IOP in order for him to consider moving back home. He " "states that she's used it very briefly in the past, but then stopped because "she wanted to continue drinking." Feels pt is "resentful for not being trusted, but as far as (he's) concerned, the trust has been gone for a long while."   Unsure if she had anything to drink day of discharged (on 11/6), but states that he "wouldn't be surprised if she did." Aware she was discharged on a Valium taper, but states that in the past, she'd never need it as she'd just start drinking again.   States that she has "chronic back problems" and might need to bring in a back cushion from home, as he "(doesn't) want (her being uncomfortable in the chairs) to be an excuse (for her leaving IOP)."   Able and willing to attend family meeting when scheduled; can be reached at above number for any further information/updates.   States that he can pick pt up today given drowsiness in setting of Valium taper; will likely be able to drive her to ORP tomorrow too, or if not, will call her an Uber.       REVIEW OF SYSTEMS:    [] Y  [x] N  sleep disturbance: **   [] Y  [x] N  appetite/weight change: **   [x] Y  [] N  fatigue/anergia: **   [x] Y  [] N  impairment in focus/concentration: **     [x] Y  [] N  depression: **   [x] Y  [] N  anxiety/worry: **   [] Y  [x] N  dysregulated mood/behavior: **   [] Y  [x] N  manic symptomatology: **   [] Y  [x] N  psychosis: **       A pertinent medical review of systems was performed with the following notable findings: see HPI  She endorsed feeling "sleepy, jittery, lightheaded; I'm detoxing."    CURRENT PSYCHOTROPIC REGIMEN:  I[x]I Y  I[]I N  I[]I U          ADDICTION:     I[]I N/A  I[]I Y  I[x]I N  I[]I U  WITHDRAWAL SYMPTOMS:   I[]I N/A  I[]I Y  I[x]I N  I[]I U  CRAVINGS:     I[x]I Y  I[]I N  I[]I U  I[x]I Current  I[]I Former  Nicotine Use: daily (cigarettes and/or vaping)  I[x]I Y  I[]I N  I[]I U  I[x]I Current  I[]I Former  Alcohol Use: 1 bottle of vodka " "daily  I[x]I Y  I[]I N  I[]I U  I[x]I Current  I[]I Former  Alcohol Misuse/Abuse:   I[x]I Y  I[]I N  I[]I U  I[]I Current  I[]I Former  Illicit Drug Use/Misuse/Abuse: cannabis use "very rarely, when it's around; don't remember the last time."   I[]I Y  I[x]I N  I[]I U  I[]I Current  I[]I Former  Misuse/Abuse of Rx Medications:   I[x]I Cannabis  I[]I Cocaine  I[]I Heroin  I[]I Meth  I[]I Opioids  I[]I Stimulants  I[]I Benzos  I[]I Other:     I[]I N/A  I[]I U  Substance(s) of Choice: alcohol   I[]I N/A  I[]I U  Substance(s) Used Currently/Recently: alcohol  I[]I N/A  I[]I U  Alcohol Consumption: physiologically dependent 1 bottle of vodka daily  I[]I N/A  I[]I U  Last Drink: yesterday (11/7/23) evening  I[]I N/A  I[]I U  Last Drug Use: months ago (cannabis)  I[]I N/A  I[]I U  Duration of Sobriety/Abstinence: pt's last drink was yesterday evening    I[x]I Y  I[]I N  I[]I U  Hx of Detox:   I[x]I Y  I[]I N  I[]I U  Hx of Rehab:   I[]I Y  I[x]I N  I[]I U  Hx of IVDU:   I[]I Y  I[x]I N  I[]I U  Hx of Accidental Overdose:   I[]I Y  I[x]I N  I[]I U  Hx of DUI:   I[x]I Y  I[]I N  I[]I U  Hx of Complicated Withdrawal (i.e. Seizures and/or Delirium Tremens):   I[x]I Y  I[]I N  I[]I U  Hx of Known/Suspected Substance-Induced Psychiatric Disorder:   I[x]I Y  I[]I N  I[]I U  Hx of Medication Assisted Treatment:   I[x]I Y  I[]I N  I[]I U  Hx of Twelve Step Program (or Equivalent) Involvement:   I[]I Y  I[]I N  I[]I U  Currently Exhibits Signs of Intoxication: pt's last drink was yesterday evening; appeared drowsy, on Valium taper  I[]I Y  I[x]I N  I[]I U  Currently Exhibits Signs of Withdrawal:   I[]I Y  I[x]I N  I[]I U  Currently Active in Recovery:   I[]I Y  I[]I N  I[]I U  Social Support:   I[]I Y  I[]I N  I[x]I U  Spouse/Partner Consumption:     I[]I N/A  I[x]I Y  I[]I N  I[]I U  Acknowledges/Accepts Addiction:   I[]I N/A  I[x]I Y  I[]I N  I[]I U  Advised to Quit/Cut Back:   I[]I N/A  I[x]I Y  I[]I N  I[]I U  Alcohol/Drug " "Cessation ("Wants to Quit"): Interested in Quitting, Motivational Interviewing Provided  I[]I N/A  I[x]I Y  I[]I N  I[]I U  Motivation to Pursue Treatment: Agreeable  I[]I N/A  I[]I Y  I[]I N  I[x]I U  Tobacco Cessation ("Wants to Quit"):     I[]I N/A  I[]I Y  I[]I N  I[x]I U  I[]I A  Awareness of Biomedical Complications:   I[]I N/A  I[]I Y  I[]I N  I[x]I U  I[]I A  Understands Need for Lifetime Sobriety:     View/Acceptance of Twelve Step (or Equivalent) Programs: Accepting/willing to attend meetings    DSM-5-TR SUBSTANCE USE DISORDER CRITERIA:     -- Impaired Control:  I[x]I Y  I[]I N  I[]I U  I[]I A  I[]I D  Often take in larger amounts or over a longer period of time than was intended:   I[x]I Y  I[]I N  I[]I U  I[]I A  I[]I D  Persistent desire or unsuccessful efforts to cut down or control use:   I[x]I Y  I[]I N  I[]I U  I[]I A  I[]I D  Great deal of time spent in activities necessary to obtain substance, use, or recover from effects:   I[x]I Y  I[]I N  I[]I U  I[]I A  I[]I D  Craving/strong desire for substance or urge to use:   -- Social Impairment:  I[x]I Y  I[]I N  I[]I U  I[]I A  I[]I D  Use resulting in failure to fulfill major role obligations at home, work or school:   I[x]I Y  I[]I N  I[]I U  I[]I A  I[]I D  Social, occupational, recreational activities decreased because of use:   I[x]I Y  I[]I N  I[]I U  I[]I A  I[]I D  Continued use despite having persistent or recurrent social or interpersonal problems caused or exacerbated by the substance:   -- Risky Use:  I[x]I Y  I[]I N  I[]I U  I[]I A  I[]I D  Recurrent use in situations in which it is physically hazardous:   I[x]I Y  I[]I N  I[]I U  I[]I A  I[]I D  Use despite physical or psychological problems that are likely to have been caused or exacerbated by the substance:   -- Neuroadaptation:  I[x]I Y  I[]I N  I[]I U  I[]I A  I[]I D  Tolerance, as defined by either of the following: (1) a need for markedly increased amounts of substance to achieve " intoxication or desired effect.  -OR- (2) a markedly diminished effect with continued use of the same amount of substance:   I[x]I Y  I[]I N  I[]I U  I[]I A  I[]I D  Withdrawal, as manifested by either of the following: (1) the characteristic withdrawal syndrome for substance.  -OR- (2) substance is taken to relieve or avoid withdrawal symptoms:   -- Mild (1-3), Moderate (4-5), Severe (?6)    I[]I N/A  I[x]I Y  I[]I N  I[]I U  I[]I A  I[]I D  Active Substance Use Disorder:       HISTORY:     I[]I Y  I[]I N  I[]I U  Psychiatric Diagnoses: alcohol use disorder, unspecified depressive disorder, unspecified anxiety  I[]I Y  I[]I N  I[]I U  Current Psychiatric Provider (if Applicable): no  I[]I Y  I[]I N  I[]I U  Hx of Psychiatric Hospitalization: yes, most recently at [facility] in [place] in 9/18 due to concern for SA     I[]I Y  I[]I N  I[]I U  Hx of Outpatient Psychiatric Treatment (psychiatry/psychotherapy): yes; no current outpatient psychiatrist, but does follow-up with therapist  I[]I Y  I[]I N  I[]I U  Psychotropic Trials: Abilify, Doxepin, Clonidine, Cymbalta, Prozac, Remeron, Trazodone, Buspar, Pristiq   I[]I Y  I[]I N  I[]I U  Prior Suicide Attempts: yes (per chart) in 2017  I[]I Y  I[]I N  I[]I U  Hx of Suicidal Ideation: yes  I[]I Y  I[]I N  I[]I U  Hx of Homicidal Ideation: no  I[]I Y  I[]I N  I[]I U  Hx of Self-Injurious Behavior (Non-Suicidal): no  I[]I Y  I[]I N  I[]I U  Hx of Violence: no  I[]I Y  I[]I N  I[]I U  Documented Hx of Malingering: no    FAMILY HISTORY:  I[]I Y  I[]I N  I[x]I U        I[]I Y  I[x]I N  I[]I U  Hx of Trauma/Neglect:   I[]I Y  I[x]I N  I[]I U  Hx of Physical Abuse:   I[]I Y  I[x]I N  I[]I U  Hx of Sexual Abuse:   I[x]I Y  I[]I N  I[]I U  Grew Up Locally?:   I[x]I Y  I[]I N  I[]I U  Happy Childhood?:   I[]I Y  I[x]I N  I[]I U  Significant Developmental Delay/Disability?:   I[x]I Y  I[]I N  I[]I U  GED/High School Dipoloma?:   I[]I Y  I[x]I N  I[]I U  Post High School  Education?:   I[]I Y  I[x]I N  I[]I U  Currently Employed?:   I[]I Y  I[x]I N  I[]I U  On or Applying for Disability?:   I[x]I Y  I[]I N  I[]I U  Functions Independently?:   I[x]I Y  I[]I N  I[]I U  Financially Stable?:   I[x]I Y  I[]I N  I[]I U  Domiciled?:   I[]I Y  I[x]I N  I[]I U  Lives Alone?:   I[x]I Y  I[]I N  I[]I U  Heterosexual/Cisgender?:   I[x]I Y  I[]I N  I[]I U  Currently in a Romantic Relationship?:   I[x]I Y  I[]I N  I[]I U  Ever ?:   I[x]I Y  I[]I N  I[]I U  Children/Dependents?:   I[x]I Y  I[]I N  I[]I U  Nondenominational/Spiritual?:   I[]I Y  I[x]I N  I[]I U   History?:   I[]I Y  I[x]I N  I[]I U  Current Legal Issues:   I[]I Y  I[x]I N  I[]I U  Past Charges/Convictions:   I[]I Y  I[x]I N  I[]I U  Hx of Incarceration:   I[]I Y  I[]I N  I[x]I U  Engaged in Hobbies/Recreational Activities?:   I[]I Y  I[x]I N  I[]I U  Access to a Gun?: no    I[x]I Y  I[]I N  I[]I U  Hx of Seizure: hx of withdrawal seizures  I[]I Y  I[]I N  I[x]I U  Hx of Significant Head Trauma (e.g., Loss of Consciousness, Concussion, Coma):    I[x]I Y  I[]I N  I[]I U  Medical History & Diagnoses:       The patient's past medical history has been reviewed and updated as appropriate within the electronic medical record system.    Alcohol use disorder, severe, dependence    Alcohol withdrawal    Depression     Scheduled and PRN Medications: The electronic chart was reviewed and updated as appropriate.  See Medcard for details.    Current Outpatient Medications:     acetaminophen (TYLENOL) 500 MG tablet, Take 500-1,500 mg by mouth daily as needed for Pain., Disp: , Rfl:     albuterol (PROVENTIL/VENTOLIN HFA) 90 mcg/actuation inhaler, , Disp: , Rfl:     anastrozole (ARIMIDEX) 1 mg Tab, , Disp: , Rfl:     ARIPiprazole (ABILIFY) 10 MG Tab, Take 1 tablet by mouth once daily., Disp: , Rfl:     atorvastatin (LIPITOR) 10 MG tablet, Take 1 tablet by mouth once daily., Disp: , Rfl:     busPIRone (BUSPAR) 15 MG tablet, , Disp: , Rfl:      butalbital-acetaminophen-caffeine -40 mg (FIORICET, ESGIC) -40 mg per tablet, Take 1 tablet by mouth every 4 (four) hours as needed., Disp: , Rfl:     cholecalciferol, vitamin D3, (VITAMIN D3) 50 mcg (2,000 unit) Cap capsule, TAKE 1 CAPSULE BY MOUTH ONCE A DAY IN THE MORNING, Disp: , Rfl:     cloNIDine (CATAPRES) 0.1 MG tablet, Take by mouth., Disp: , Rfl:     diazePAM (VALIUM) 5 MG tablet, Take 2 tablets (10 mg total) by mouth 2 (two) times a day for 1 day, THEN 2 tablets (10 mg total) once daily for 1 day, THEN 1 tablet (5 mg total) once daily for 1 day., Disp: 7 tablet, Rfl: 0    dicyclomine (BENTYL) 20 mg tablet, Take 20 mg by mouth 4 (four) times daily., Disp: , Rfl:     doxepin (SINEQUAN) 150 MG Cap, Take 150 mg by mouth every evening., Disp: , Rfl:     DULoxetine (CYMBALTA) 60 MG capsule, Take 60 mg by mouth., Disp: , Rfl:     EScitalopram oxalate (LEXAPRO) 10 MG tablet, Take 10 mg by mouth once daily., Disp: , Rfl:     FLUoxetine 20 MG capsule, Take 3 capsules by mouth once daily., Disp: , Rfl:     fluticasone furoate-vilanteroL (BREO ELLIPTA) 100-25 mcg/dose diskus inhaler, Inhale 1 puff into the lungs once daily. Controller, Disp: 60 each, Rfl: 3    folic acid (FOLVITE) 1 MG tablet, Take 1 tablet (1 mg total) by mouth once daily., Disp: 30 tablet, Rfl: 11    hydroCHLOROthiazide (HYDRODIURIL) 25 MG tablet, , Disp: , Rfl:     hydrOXYzine pamoate (VISTARIL) 50 MG Cap, TAKE ONE CAPSULE BY MOUTH EVERY 6 HOURS AS NEEDED FOR ANXIETY AND/OR EVERY NIGHT AT BEDTIME AS NEEDED FOR INSOMNIA, Disp: , Rfl:     ipratropium (ATROVENT HFA) 17 mcg/actuation inhaler, Inhale 2 puffs into the lungs every 6 (six) hours as needed., Disp: , Rfl:     letrozole (FEMARA) 2.5 mg Tab, , Disp: , Rfl:     levothyroxine (SYNTHROID) 50 MCG tablet, TAKE 1 TABLET(50 MCG) BY MOUTH BEFORE BREAKFAST, Disp: 90 tablet, Rfl: 3    loperamide (IMODIUM) 2 mg capsule, Take 2 mg by mouth 4 (four) times daily as needed for Diarrhea (Per  package directions)., Disp: , Rfl:     losartan (COZAAR) 25 MG tablet, Take 1 tablet by mouth once daily., Disp: , Rfl:     melatonin (MELATIN), Take by mouth nightly as needed for Insomnia., Disp: , Rfl:     metFORMIN (GLUCOPHAGE-XR) 500 MG ER 24hr tablet, Take 2 tablets (1,000 mg total) by mouth 2 (two) times daily with meals., Disp: 360 tablet, Rfl: 3    metoprolol succinate (TOPROL-XL) 50 MG 24 hr tablet, , Disp: , Rfl:     mirtazapine (REMERON) 30 MG tablet, , Disp: , Rfl:     multivitamin Tab, Take 1 tablet by mouth once daily., Disp: 30 tablet, Rfl: 3    nabumetone (RELAFEN) 500 MG tablet, Take 500 mg by mouth 2 (two) times daily., Disp: , Rfl:     ondansetron (ZOFRAN-ODT) 4 MG TbDL, Take 1 tablet (4 mg total) by mouth every 6 (six) hours as needed (nausea/vomiting)., Disp: 12 tablet, Rfl: 0    pantoprazole (PROTONIX) 40 MG tablet, Take 40 mg by mouth once daily., Disp: , Rfl:     propranoloL (INDERAL) 20 MG tablet, Take 20 mg by mouth 2 (two) times daily., Disp: , Rfl:     thiamine 100 MG tablet, Take 1 tablet (100 mg total) by mouth once daily., Disp: 30 tablet, Rfl: 11    traZODone (DESYREL) 300 MG tablet, Take 0.5 tablets (150 mg total) by mouth every evening., Disp: 60 tablet, Rfl: 1  No current facility-administered medications for this encounter.    Facility-Administered Medications Ordered in Other Encounters:     albuterol sulfate nebulizer solution 2.5 mg, 2.5 mg, Nebulization, Once, Andrew Rodriguez MD    Allergies:  Lortab [hydrocodone-acetaminophen] and Promethazine    PSYCHOSOCIAL FACTORS:  Stressors (Biopsychosocial, Cultural and Environmental): job/career, marriage, mental health, physical health, substance use/addiction  Functioning Relationships: strained with spouse or significant others    STRENGTHS AND LIABILITIES:   Strength: Patient accepts guidance/feedback.  Strength: Patient is seeking help.  Liability: Patient has poor or no support network.  Liability: Patient lacks coping  "skills  Liability: Patient is in active addiction.    Additional Relevant History, As Applicable:       EXAMINATION:     BP (!) 112/54   Pulse 109   Temp 96.7 °F (35.9 °C)   Resp 18     MENTAL STATUS EXAMINATION:  General Appearance: **  appears stated age, dressed in casual attire, in no acute distress  Behavior: **   cooperative, minimal eye contact  Involuntary Movements and Motor Activity: **  no abnormal involuntary movements noted  Gait and Station: **   unsteadiness when walking; endorsed feeling "lightheaded"  Speech and Language: **  normal rate, rhythm, volume, tone, and pitch  Mood: "depressed"  Affect: **  constricted  Thought Process and Associations: **   linear, tangential at times  Thought Content and Perceptions: **  no suicidal or homicidal ideation, no evidence of psychosis  Sensorium: **  drowsy, oriented fully (to person, place, time and situation)  Recent and Remote Memory: **   some impairments in recent memory noted  Attention and Concentration: **  attentive to conversation  Fund of Knowledge: **  grossly intact, used appropriate vocabulary, no significant deficits noted  Insight: **  intact, demonstrates awareness of illness  Judgment: **   appropriate to current situation, but limited based on hx      RISK MANAGEMENT:     I[]I Y  I[x]I N  I[]I U  I[]I A  Suicidal Ideation/Behavior: **   I[]I Y  I[x]I N  I[]I U  I[]I A  Homicidal Ideation/Behavior: **  I[]I Y  I[x]I N  I[]I U  I[]I A  Violence: **  I[]I Y  I[x]I N  I[]I U  I[]I A  Self-Injurious Behavior: **    The patient is deemed to be a historian of unknown reliability and accuracy.    I[]I Y  I[x]I N  I[]I U  I[]I A  I[]I N/A  Minimization of Risk Parameters Suspected/Evident: **  I[]I Y  I[x]I N  I[]I U  I[]I A  I[]I N/A  Exaggeration of Risk Parameters Suspected/Evident: **      [] Y  [x] N  Danger to Self:   [] Y  [x] N  Danger to Others:   [] Y  [x] N  Grave Disability:     In cases of emergency, daily coverage " provided by Acute/ER Psych MD, NP, PA, or SW, with contact numbers located in [facility] On Call Schedule.    Elli Smith  Department of Psychiatry  [facility]        A diagnostic psychiatric evaluation was performed and responsiveness to treatment was assessed.  The patient demonstrates adequate ability/capacity to respond to treatment.    KEY:     I[]I Y = Yes / Present / Endorses  I[]I N = No / Absent / Denies  I[]I U = Unknown / Unable to Assess / Unwilling to Participate  I[]I A = Ambiguity Exists / Accuracy Uncertain  I[]I D = Denial or Minimization is Suspected/Evident  I[]I N/A = Non-Applicable    CHART REVIEW:     Available documentation has been reviewed, and pertinent elements of the chart have been incorporated into this evaluation where appropriate.    LA/MS  AWARE  Site reviewed - No recent discrepancies or irregularities are noted.      ADVICE AND COUNSELING:     [x] In cases of emergencies (e.g. SI/HI resulting in danger to self or others, functioning deteriorates to the level of grave disability), call 911 or [phone], or present to the emergency department for immediate assistance.  [x] Patient should not operate a motor vehicle or heavy machinery if effects of medications or underlying symptoms/condition impair the ability to safely do so.    Alcohol, Tobacco, and Drug Counseling, as well as resources, has been provided, as warranted.     Shared medical decision making and informed consent are the hallmark and bedrock of good clinical care, and as such have been employed and obtained, respectively, to the degree possible.      Risk Mitigation Strategies, Harm Reduction Techniques, and Safety Netting are important interventions that can reduce acute and chronic risk, and as such have been employed to the degree possible.    Prescription Drug Management entails the review, recommendation, or consideration without recommendation of medications, and as such was employed  during the encounter.    Additional Psychoeducation has been provided, as warranted.    Discussed, to the extent possible, diagnosis, risks and benefits of proposed treatment vs alternative treatments vs no treatment, potential side effects of these treatments and the inherent unpredictability of treatment. The patient expresses understanding of the above and displays the capacity to agree with this treatment given said understanding. Patient also agrees that, currently, the benefits outweigh the risks and consents to treatment at this time.     Written material has been provided to supplement, augment, and reinforce any discussions and interventions, via the AVS or other pre-printed handouts, as warranted.      DIAGNOSTIC TESTING:     The chart was reviewed for recent diagnostic procedures and investigations, and pertinent results are noted below.    Na 138  11/6/2023   K 3.6  11/6/2023   Cl 99  11/6/2023   CO2 30 (H)  11/6/2023   Ca 9.1  11/6/2023  Phos 2.9  11/6/2023   Mg 1.4 (L)  11/6/2023   Anion Gap 9  11/6/2023    Glu 154 (H)  11/6/2023   HgA1c 5.5  11/4/2023    BUN 7 (L)  11/6/2023   Cr 0.8  11/6/2023   GFR >60.0  11/6/2023   Specific [place] 1.015  11/4/2023   Protein (Urine) Negative  11/4/2023   Microalbumin *   *      T Prot 6.9  11/6/2023   Alb 4.1  11/6/2023   T Bili 0.5  11/6/2023   Alk Phos 60  11/6/2023   AST 37  11/6/2023   ALT 33  11/6/2023   GGT *   *   CDT *   *  NH3 29  4/6/2023   Amylase *   *   Lipase 21  9/16/2023    TSH 0.168 (L)  11/4/2023   Free T4 1.17  11/4/2023   T4 (Total) *   *   T3 (Total) *   *   PTH *   *  Prolactin *   *   CPK 90  11/4/2023   Troponin I <0.006  9/17/2023   PT 10.7  9/17/2023   INR 1.0  9/17/2023     WBC 7.05  11/6/2023   RBC 3.77 (L)  11/6/2023   Hgb 9.9 (L)  11/6/2023   HCT 31.8 (L)  11/6/2023   MCV 84  11/6/2023   MCH 26.3 (L)  11/6/2023   MCHC 31.1 (L)  11/6/2023   RDW 15.8 (H)  11/6/2023   PLT 93 (L)  11/6/2023    MPV 9.9  11/6/2023   ANC 1.5; 21.3 (L);  (L)  11/6/2023     Cholesterol 184  4/6/2023   Triglycerides 161 (H)  4/6/2023   .8  4/6/2023   HDL 44  4/6/2023     B12 368  6/6/2023   Folate 13.3  4/6/2023   Thiamine 136 (H)  *   Vit D *   *      HIV 1/2 Ag/Ab Non-reactive  3/10/2023   Hep B Surface *   *   Hep B Core *   *   Hep A *   *   Hep C Non-reactive  3/10/2023   RPR *   *    Lithium *   *   VPA *   *   Clozapine *   *   Carbamazepine *   *   Lamotrigine *   *   Phenytoin *   *  Phenobarbital *   *    Alcohol 221 (A)  9/16/2023   Benzodiazepines Negative  11/4/2023   Barbiturates Negative  11/4/2023   Cannabis Negative  11/4/2023   Cocaine Negative  11/4/2023   Amphetamines Negative  11/4/2023   PCP Negative  11/4/2023   Opiates Negative  11/4/2023   Methadone Negative  11/4/2023   Buprenorphine *   *   Fentanyl Not Detected  7/18/2023   Oxycodone Presumptive Positive (A)  7/18/2023   Tramadol *   *     Ethanol 211 (H)  11/4/2023  PETH 1099  6/16/2023   EtG *   *   EtS *   *   Buprenorphine *   *   Norbuprenorphine *   *   Nicotine *   *   Cotinine *   *    Results for orders placed or performed during the hospital encounter of 11/04/23   Repeat EKG 12-lead    Collection Time: 11/04/23  4:37 PM    Narrative    Test Reason : R45.1,    Vent. Rate : 125 BPM     Atrial Rate : 125 BPM     P-R Int : 160 ms          QRS Dur : 088 ms      QT Int : 298 ms       P-R-T Axes : 073 067 043 degrees     QTc Int : 430 ms    Sinus tachycardia  Otherwise normal ECG  When compared with ECG of 04-NOV-2023 10:41,  Nonspecific T wave abnormality now evident in Inferior leads  Confirmed by JORDEN BECERRA, HOMEYAR ([id number]) on 11/5/2023 9:55:02 AM    Referred By: [email or web address]   SELF           Confirmed By:DIONNE LEONARDO MD       Results for orders placed or performed during the hospital encounter of 11/04/23   CT Head Without Contrast    Narrative    EXAMINATION:  CT HEAD WITHOUT  CONTRAST    CLINICAL HISTORY:  fall, headache;    TECHNIQUE:  Multiple sequential 5 mm axial images of the head without contrast.  Coronal and sagittal reformatted imaging from the axial acquisition.    COMPARISON:  09/17/2023    FINDINGS:  Study is distorted by motion artifacts despite repeat examination.  Within limits of the study there is no evidence for acute intracranial hemorrhage or sulcal effacement to suggest large territory recent infarction.  Stable hypodensity left inferior basal ganglia suggestive for prominent perivascular space.  Ventricles relatively stable without hydrocephalus.  No midline shift or significant mass effect.  Visualized paranasal sinuses and mastoid air cells are clear.      Impression    Unremarkable motion distorted CT head as detailed above specifically without evidence for acute intracranial hemorrhage or sulcal effacement to suggest large territory recent infarction.    Clinical correlation and further evaluation as warranted.      Electronically signed by: James oStelo DO  Date:    11/05/2023  Time:    11:25   Results for orders placed or performed during the hospital encounter of 06/10/22   MRI Brain Without Contrast    Narrative    EXAMINATION:  MRI BRAIN WITHOUT CONTRAST    CLINICAL HISTORY:  hx of PRES;    TECHNIQUE:  Multiplanar multisequence MR imaging of the brain was performed without intravenous contrast.    COMPARISON:  Head CT 06/10/2022, brain MRI 10/04/2017, 07/21/2017    FINDINGS:  Intracranial Compartment:    Ventricles are normal in size for age without evidence of hydrocephalus.    Ill-defined focus T2-FLAIR signal hyperintensity in the right periatrial white matter.  Few additional scattered punctate foci elsewhere within the supratentorial white matter.  These appear similar to the prior study of 10/04/2017.  No definite new focal lesions.  No diffusion restriction to indicate an acute infarction.  No remote major vascular distribution infarct.  No recent  or remote hemorrhage.  No mass effect or midline shift.    No extra-axial blood or fluid collections.    Normal vascular flow voids are preserved.    Skull/Extracranial Contents (limited evaluation):    Bone marrow signal intensity is normal.      Impression    No evidence of acute intracranial pathology.      Electronically signed by: Miguel Malagon MD  Date:    06/10/2022  Time:    09:45

## 2023-11-08 NOTE — TREATMENT PLAN
[facility]  ADDICTIVE BEHAVIOR UNIT  INTERDISCIPLINARY TREATMENT PLAN    INTERDISCIPLINARY  TREATMENT TEAM:    Liborio Patel M.D., Psychiatrist      Raman Izaguirre LPN, Nurse    [facility], [facility],     Eh Al[facility]ga, [facility],     Rekha Abdi, [facility],     Vanessa Sam, W,     Resident: Sintia Smith MD       Signatures scanned into record separately.      ESTIMATED LOS:  4-6 weeks        The patient has reviewed the treatment plan with staff and has signed the Patient Responsibilities form.  Patient signature scanned into record separately        Dr. Liborio Patel certifies that the patient would require inpatient psychiatric care if the Partial Hospitalization services were not provided, and services will be furnished under the care of a physician, and under a written Plan of Treatment.    Liborio Paetl M.D., Psychiatrist - Signature scanned into record separately.      TREATMENT PLAN    DIAGNOSIS:      Patient/Family Education Needs/Barriers to Learning (i.e., Language, Reading, Comprehension): None         Support/Advocacy Services/Needs (i.e., Financial, Transportation, Medications): None       Community Resources (i.e., Alcoholics Anonymous, Al Anon, Cocaine Anonymous, Narcotics Anonymous): Pt was provided with NA/ AA community resources.         Patient Goals:  Sobriety  2.   Return to painting   3.   Not depressed anymore   4.   Improve marriage     Current Coping Skills:   Drinking   2.   Painting   3.   Music   4.   Watch TV       Strengths:    Good friend   2.   Sensitive to other's needs   3.   Artistic   4.   Listening to music         Limitations:  Son's Death   2.     3.   Feeling sorry for myself   4.  Self loathing    Goals and Objectives:  1. Goal:  Abstain from alcohol and illicit drugs   Objective measure: Negative breathalyzer, negative urine screens   Time frame to reach goal: By discharge    2  Goal: Attend  daily 12-step meetings   Objective measure: Signed attendance sheet daily   Time frame to reach goal: Each day    3. Goal: Participate in group sessions    Objective measure: Progress notes indicating active listening, self-disclosure, feedback   Time frame to reach goal: Each day    4. Goal: Obtain a 12-step sponsor   Objective measure: self-report   Time frame to reach goal: By discharge    5. Goal: Complete Life Story   Objective measure: Share story with group   Time frame to reach goal: Within first two weeks of treatment    6. Goal: Complete First    Objective measure: Share 1st step with group   Time frame to reach goal:  By discharge    7. Goal: Complete Relapse Prevention Plan   Objective measure: Share plan with group   Time frame to reach goal: By discharge    8. Goal: Complete detoxification   Objective measure: Physician progress note indicating detoxification is complete   Time frame to reach goal: Two weeks    9.  Goal: Family involvement/participation   Objective measure: Family session documented in progress notes   Time frame to reach goal: By discharge    10. Goal:  Reduce depression   Objective measure: Physician progress note indicating depression is improved   Time frame to reach goal: By discharge    11.  Goal: Reduce anxiety   Objective measure: Physician progress note indicating anxiety is improved   Time frame to reach goal: By discharge      Group Interventions:    Psychodynamic Group Psychotherapy  1 hour, five times per week  Goals: 1. Utilize group empathy and support for problem solving; 2. Apply stress management, communication, and assertive skills to personal issues; 3. Discuss negative consequences of addictive behavior; 4. Discuss ways to change lifestyle to support sobriety; 5. Discuss addiction history    Addiction Education Group  1 hour, 4 times per week  Goals:  1. Verbalize increased knowledge of the process of recovery; 2. Understand basic concepts of addiction (denial,  powerlessness, unmanageabiltiy, etc.); 3. Develop a consistent, positive image of self; 4. Identify personal values that may aid in recovery    Steps to Recovery Group  1 hour, 5 times per week  Goals:  1. Learn 12 steps; 2. Identify ways to incorporate 12 step principles into daily life; 3. Complete first step; 4. Verbalize knowledge and understanding of the concept of a higher power    Relapse Prevention Group  1 hour, 2 times per week  Goals: 1. Verbalize increased knowledge of chemical dependence and the process of recovery; 2. Verbally identify specific behaviors, attitudes, and feelings that may lead to relapse, focusing on triggers; 3. Identify behavior patterns that will need to be changed to maintain sobriety, including people and places that must be avoided; 4. Identify specific strategies to address relevant identified relapse triggers; 5. Identify positive rewards associated with abstinence    Living Sober Group  1 hour, 2 times per week  Goals:  1. Reflect upon events of day/weekend, focusing on positive change; 2.  Discuss dynamics of 12 step meetings attended; 3. Discuss topics from book Living Sober     Stress Management Skills Group  1 hour, 3 times per week  Goals: 1. Identify types and levels of stress; 2. Identify and change maladaptive beliefs and behaviors; 3. Identify and practice techniques of stress management    Disease Concept Group  1 hour, 1 time per week  Goals: 1. Verbalize an understanding of the disease concept of addiction; 2. Increase familys understanding of the disease concept of addiction    Communication Skills Group  1 hour, 2 times per week  Goals: 1. Learn rules of effective communication; 2. Improve listening skills; 3. Practice clear communication    Promoting Healthy Lifestyles Group  1 hour, 1 time per week  Goals:  1. Understand the biopsychosocial model of health; 2. Develop insight into how substance abuse/dependency can impact dimensions of health; 3. Develop  appropriate health promotion strategies    Relationship Dynamics Group  1 hour, 1 time per week  Goals:  1. Learn about factors that shape relationships; 2. Understand the central role of relationships in personal well-being; 3. Learn how to improve all relationships    Medical Complications Group  1 hour, 1 time per week  Goals:  1.  Increase knowledge of how addiction negatively affects the body; 2. Increase awareness of how abstinence can positively impact health     Mental Hygiene Group  1 hour, 1 time per week  Goals:  1. Increase healthy habits of relaxation, exercise, and nutrition; 2. Discuss strategies for coping with depression and anxiety    Daily Reflections Group  ½ hour, 5 times per week  Goals:  1. Independently journal events and emotions; 2. Independently consider positive concepts of recovery on a daily basis    Check-In/Weekend Process  1 hour, 1 time per week  Goals:  1. Identification of situations/needs that may have arisen over the weekend.  2. Identification of goals for the week.  3. Emotional, physical, spiritual, and social check-in to begin group.

## 2023-11-08 NOTE — PROGRESS NOTES
Group Psychotherapy (PhD/LCSW)    Site: [facility]    Clinical status of patient: Intensive Outpatient Program (IOP)    Date: 11/8/2023    Group Focus: Mindfulness     Length of service: 22784 - 45-50 minutes    Number of patients in attendance: 9    Referred by: Addictive Behavior Unit Treatment Team    Target symptoms: Alcohol Abuse    Patient's response to treatment: Active Listening and Self-disclosure    Progress toward goals: Progressing adequately    Interval History: Session focus was Mindfulness: Mindfulness 'What' Skills. Patient's were introduced to mindfulness what skills of observing, describing, participating. Patient's identified the value of each skill, how to use each skill, and practiced each skill in session.     Diagnosis:     ICD-10-CM ICD-9-CM   1. Alcohol use disorder, severe, dependence  F10.20 303.90   2. Alcohol withdrawal syndrome without complication  F10.930 291.81   3. Other depression  F32.89 311      Plan: Continue treatment on ORP

## 2023-11-09 ENCOUNTER — TELEPHONE (OUTPATIENT)
Dept: PSYCHIATRY | Facility: HOSPITAL | Age: 63
End: 2023-11-09
Payer: COMMERCIAL

## 2023-11-09 LAB
BACTERIA BLD CULT: NORMAL
BACTERIA BLD CULT: NORMAL

## 2023-11-09 NOTE — PLAN OF CARE
11/08/23 1300   Activity/Group Therapy Checklist   Group Goals/Reflection   Attendance Attended   Follows Direction Followed directions   Group Interactions/Observations Drowsy;Fell Asleep

## 2023-11-09 NOTE — TELEPHONE ENCOUNTER
Addiction Psychiatry (ORP) Plan of Care - Telephone Call      Pt's  called this morning to ORP stating that pt has been having diarrhea and not feeling well, and would be unable to attend today.   Called pt to check in ([phone]). She endorsed hx of bowel incontinence, however, has been having (more severe/frequent) cramping abdominal pain and diarrhea since last night. Denied fevers, n/v. In addition, has been experiencing flare up of chronic back pain; has appointment with surgeon tomorrow morning at 9:45 AM; however, plans to come to ORP afterwards assuming she's feeling better.   Stated that last drink was Tuesday, 11/7/23 evening. Took Valium 5mg this morning; has one more tablet remaining. Denied any other current withdrawal sx.   Encouraged pt to stay hydrated and to seek medical care if sx worsen.

## 2023-11-10 NOTE — PHYSICIAN QUERY
PT Name: Earl Abdul  MR #: [id number]    DOCUMENTATION CLARIFICATION     CDS/: Negrita River RN              Contact information: [email or web address]  This form is a permanent document in the medical record.     Query Date: November 10, 2023    By submitting this query, we are merely seeking further clarification of documentation. Please utilize your independent clinical judgment when addressing the question(s) below.    The Medical Record contains the following:   Indicators   Supporting Clinical Findings Location in Medical Record   x AMS, Confusion,  LOC, etc.  Agitated, encephalopathic,    Very agitated. Alert but not linear      Pt became agitated and aggressive in the ED.  Required physical and chemical restraints    She is lethargic and disoriented    She is easily aroused. 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  Malignant neoplasm of central portion of right breast in female, estrogen receptor positive   11/4 ED MD PN   x Radiology Findings Unremarkable motion distorted CT head as detailed above specifically without evidence for acute intracranial hemorrhage or sulcal effacement to suggest large territory recent infarction 11/5 CT Head      Electrolyte Imbalance/ Lab     x Medication NS 1000mL bolus 11/4  Thiamine IVPB 11/4-6  Azithromycin IVPB 11/4  Ceftriaxone IVPB 11/4  Diazepam 10mg IVP x 5 doses 11/5-6  Ativan IVPB x 8 doses   11/4-6 MAR   x Treatment         Restraint status:  Administered 2 mg Ativan and 2.5 mg Haldol.  Soft restraints ordered.    CIWA protocol  Thiamine  Folate  Initiating Valium Taper  given history  Consulting psych given history  Monitor vitals   Telemetry  Attempt Naltrexone initiation at discharge  Alcohol cessation counseling   11/4 ED MD TORRES    11/4-6 MAR              11/4 HP   x Other Pt reports daily drinking of 1 bottle of hard liquor 11/4 ED MD TORRES     The noted clinical guidelines are only system guidelines and do not replace the providers clinical judgment.    The National [place] of Neurologic Disorders and Stroke (NINDS) of the NIH describes encephalopathy as any diffuse disease of the brain that alters brain function or structure.    Provider, please specify the diagnosis or diagnoses associated with above clinical findings.    Carloz all that apply    [   ] Metabolic Encephalopathy -   Due to electrolyte imbalance, metabolic derangements, or infectious processes, includes Septic Encephalopathy, Uremic Encephalopathy     [   ] Toxic Encephalopathy - Due to drugs, chemicals, or other toxic substances     [x   ] Alcoholic Encephalopathy - Degeneration of the nervous system due to alcohol      [   ] Wernickes Encephalopathy - Neurological symptoms caused by biochemical lesions of the central nervous system after exhaustion of B-vitamin reserves, in particular thiamine; Commonly related to alcohol use     [   ] Encephalopathy, unspecified        [   ] Other Encephalopathy (please specify): ____________________     [   ] Other neurological condition- Includes Post-ictal altered mental status (please specify condition): __________     [   ]  Clinically Undetermined         Please document in your progress notes daily for the duration of treatment until resolved, and include in your discharge summary.    References:  SHAMIR Pack, RN, CCDS. (2018, June 9). Notes from the Instructor: Encephalopathy tips. Retrieved October 22, 2020, from [email or web address]/articles/[email or web address]-[email or web address]-encephalopathy-tips    ICD-9-CM Coding Clinic First Quarter 2013, Effective with discharges: October 21, 2013  [facility] [employer] § Seizure with encephalopathy due to postictal state (2013).    ICD-10-CM/PCS [employer] Integrated Codebook (Version V.20.8.10.0) [Computer software]. (2020). Retrieved October 21, 2020.    National [place] of Neurological Disorders and Stroke. (2019, March 27). Retrieved October 22, 2020, from https://www.ninds.nih.gov/Disorders/All-Disorders/Jdmtdiajwujsjq-Bhgmkdfttmv-Ngvc    Form No. 92710

## 2023-11-13 ENCOUNTER — HOSPITAL ENCOUNTER (OUTPATIENT)
Dept: PSYCHIATRY | Facility: HOSPITAL | Age: 63
Discharge: HOME | End: 2023-11-13
Attending: STUDENT IN AN ORGANIZED HEALTH CARE EDUCATION/TRAINING PROGRAM
Payer: COMMERCIAL

## 2023-11-13 ENCOUNTER — LAB VISIT (OUTPATIENT)
Dept: LAB | Facility: HOSPITAL | Age: 63
End: 2023-11-13
Attending: PSYCHIATRY & NEUROLOGY
Payer: COMMERCIAL

## 2023-11-13 ENCOUNTER — PATIENT MESSAGE (OUTPATIENT)
Dept: SURGERY | Facility: CLINIC | Age: 63
End: 2023-11-13
Payer: COMMERCIAL

## 2023-11-13 VITALS
DIASTOLIC BLOOD PRESSURE: 57 MMHG | RESPIRATION RATE: 18 BRPM | SYSTOLIC BLOOD PRESSURE: 113 MMHG | TEMPERATURE: 97 F | HEART RATE: 65 BPM

## 2023-11-13 DIAGNOSIS — C91.10 CLL (CHRONIC LYMPHOCYTIC LEUKEMIA): Primary | ICD-10-CM

## 2023-11-13 DIAGNOSIS — F10.20 ALCOHOL USE DISORDER, SEVERE, DEPENDENCE: Primary | ICD-10-CM

## 2023-11-13 DIAGNOSIS — F10.20 ALCOHOL USE DISORDER, SEVERE, DEPENDENCE: ICD-10-CM

## 2023-11-13 DIAGNOSIS — F41.8 DEPRESSION WITH ANXIETY: ICD-10-CM

## 2023-11-13 DIAGNOSIS — F10.20 ALCOHOL USE DISORDER, SEVERE, DEPENDENCE: Primary | Chronic | ICD-10-CM

## 2023-11-13 DIAGNOSIS — F10.151 ALCOHOL ABUSE WITH ALCOHOL-INDUCED PSYCHOTIC DISORDER WITH HALLUCINATIONS: ICD-10-CM

## 2023-11-13 LAB
ALBUMIN SERPL BCP-MCNC: 3.9 G/DL (ref 3.5–5.2)
ALP SERPL-CCNC: 48 U/L (ref 55–135)
ALT SERPL W/O P-5'-P-CCNC: 24 U/L (ref 10–44)
AMPHET+METHAMPHET UR QL: NEGATIVE
ANION GAP SERPL CALC-SCNC: 7 MMOL/L (ref 8–16)
AST SERPL-CCNC: 26 U/L (ref 10–40)
BARBITURATES UR QL SCN>200 NG/ML: NEGATIVE
BENZODIAZ UR QL SCN>200 NG/ML: ABNORMAL
BILIRUB SERPL-MCNC: 0.3 MG/DL (ref 0.1–1)
BUN SERPL-MCNC: 17 MG/DL (ref 8–23)
BZE UR QL SCN: NEGATIVE
CALCIUM SERPL-MCNC: 9.2 MG/DL (ref 8.7–10.5)
CANNABINOIDS UR QL SCN: NEGATIVE
CHLORIDE SERPL-SCNC: 104 MMOL/L (ref 95–110)
CO2 SERPL-SCNC: 27 MMOL/L (ref 23–29)
CREAT SERPL-MCNC: 0.9 MG/DL (ref 0.5–1.4)
CREAT UR-MCNC: 82 MG/DL (ref 15–325)
EST. GFR  (NO RACE VARIABLE): >60 ML/MIN/1.73 M^2
ETHANOL UR-MCNC: <10 MG/DL
GLUCOSE SERPL-MCNC: 66 MG/DL (ref 70–110)
METHADONE UR QL SCN>300 NG/ML: NEGATIVE
OPIATES UR QL SCN: ABNORMAL
PCP UR QL SCN>25 NG/ML: NEGATIVE
POTASSIUM SERPL-SCNC: 5.3 MMOL/L (ref 3.5–5.1)
PROT SERPL-MCNC: 7 G/DL (ref 6–8.4)
SODIUM SERPL-SCNC: 138 MMOL/L (ref 136–145)
TOXICOLOGY INFORMATION: ABNORMAL

## 2023-11-13 PROCEDURE — 80053 COMPREHEN METABOLIC PANEL: CPT | Performed by: PSYCHIATRY & NEUROLOGY

## 2023-11-13 PROCEDURE — 36415 COLL VENOUS BLD VENIPUNCTURE: CPT | Performed by: PSYCHIATRY & NEUROLOGY

## 2023-11-13 PROCEDURE — 90853 GROUP PSYCHOTHERAPY: CPT

## 2023-11-13 PROCEDURE — 80307 DRUG TEST PRSMV CHEM ANLYZR: CPT | Performed by: PSYCHIATRY & NEUROLOGY

## 2023-11-13 PROCEDURE — 99232 PR SUBSEQUENT HOSPITAL CARE,LEVL II: ICD-10-PCS | Mod: ,,, | Performed by: PSYCHIATRY & NEUROLOGY

## 2023-11-13 PROCEDURE — 80321 ALCOHOLS BIOMARKERS 1OR 2: CPT | Performed by: PSYCHIATRY & NEUROLOGY

## 2023-11-13 PROCEDURE — 99232 SBSQ HOSP IP/OBS MODERATE 35: CPT | Mod: ,,, | Performed by: PSYCHIATRY & NEUROLOGY

## 2023-11-13 NOTE — PLAN OF CARE
11/13/23 7899   Activity/Group Therapy Checklist   Group Addiction Education   Attendance Did not attend     Patient did not attend group. IOP nurse attempted to contact Pt, unable to reach her.

## 2023-11-13 NOTE — PROGRESS NOTES
Group Psychotherapy (PhD/LCSW)    Site: [facility]    Clinical status of patient: Intensive Outpatient Program (IOP)    Date: 11/13/2023    Group Focus: Distress Tolerance     Length of service: 20539 - 45-50 minutes    Number of patients in attendance: 9    Referred by: Addictive Behavior Unit Treatment Team    Target symptoms: Alcohol Abuse    Patient's response to treatment: Active Listening and Self-disclosure    Progress toward goals: Progressing adequately    Interval History: Session focus was Distress Tolerance:  STOP. Patients were encouraged to use crisis survival skills to reduce intensity of distress.  They were taught to STOP in the moment to reduce unhelpful action urges.    Diagnosis:     ICD-10-CM ICD-9-CM   1. Alcohol use disorder, severe, dependence  F10.20 303.90   2. Depression with anxiety  F41.8 300.4   3. 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

## 2023-11-13 NOTE — PLAN OF CARE
"   11/13/23 1611   Activity/Group Therapy Checklist   Group Other (Comments)  (Strengths Exercise)   Attendance Attended   Follows Direction Followed directions   Group Interactions/Observations Interacted appropriately;Alert;Supportive;Sharing   Affect/Mood Range Normal range   Affect/Mood Display Appropriate   Goal Progression Progressing      facilitated group session. SW discussed activity , "Identify Exploration Exercise" and discussed the importance of connecting and being present with self. Sw explained to pts that having a strong sense of identity can give a greater meaning to life. Pts were asked to name parts of their identities, I.e ( mother , friend, teacher, etc.) describe what the identity meant to then rate how much they identified from 1-10 with the stated identity.   "

## 2023-11-13 NOTE — PROGRESS NOTES
FOLLOW UP VISIT: [facility]  DATE OF ADMISSION: 11/8/23    ASSESSMENT AND PLAN:     DIAGNOSES & PROBLEMS:  Alcohol use disorder, severe  Unspecified depressive disorder  Unspecified anxiety    In Summary:  Earl Abdul is a 63 y.o. female with past psychiatric history of tobacco abuse, alcohol abuse with seizures and complicated withdrawal, and depression with SA in 2017 & past pertinent medical history of breast cancer, HTN, HLD, fibromyalgia, sarcoidosis, hypothyroidism, T2DM, CLL (not on treatment) and COPD  presents  to ABU for Alcohol use disorder, severe, dependence.   Pt has had several hospital admissions for alcohol withdrawal, and has a hx of complicated withdrawal, including seizures, hallucinations. Has been to residential rehab several times, attended AA meetings, completed IOP in the past.   Most recent hospital admission was 11/4/23-11/6/23; see above. Pt had BAL of 211 on ED presentation.   Discharged from hospital on 11/6/23 on Valium taper. Last drink was 11/7/23 evening and has now completed Valium taper.     Plan:  - Last drink: 11/7/23  - Valium taper now completed  - Pt recently prescribed Norco for chronic back pain by orthopedic surgeon   - Called pt's orthopedic surgeon's office (Dr. Gonzalez at [facility]) to discuss other options/alternatives for pain relief (pt is currently prescribed Norco), given pt's current active recovery and ORP admission. Unable to reach; LVM with callback number.   - Reviewed psychotropic regimen:              - (Lexapro 10mg daily), Abilify 10mg daily, Trazodone 150mg qhs              - Per pt, she hasn't been prescribed/taking Lexapro (was given while hospitalized); will reassess and consider starting/restarting for depressive sx and anxiety  - Continue ORP for IOP level of care; given hx, may need higher level of care  - Follow up: UTox, alcohol biomarkers, PETH, CMP  - Discussed with pt's , who is in agreement  with plan     In cases of emergency, daily coverage provided by Acute/ER Psych MD, NP, PA, or SW, with contact numbers located in [facility] On Call Schedule.     [x]  Admit to the ORP.  [x]  Initiate patient on ORP protocol.  [x]  Additional workup planned to address substance use disorder, in order to guide and refine ongoing management options, includes serial alcohol and drug laboratory testing (e.g. PETH, POCT breath alcohol test, urine toxicology, alcohol biomarkers).  [x]  Full engagement in 12 step (or equivalent) recovery program(s), including mandatory meeting attendance and acquisition of sponsor.  [x]  Patient counseled on abstinence from alcohol and substances of abuse (illicit and prescription).  [x]  Relapse prevention and motivational interviewing provided.     III[x]III  PRESCRIPTION DRUG MANAGEMENT:      Prescription Drug Management entails the review, recommendation, or consideration without recommendation of medications, and as such was employed during the encounter.     Discussed, to the extent possible, diagnosis, risks and benefits of proposed treatment vs alternative treatments vs no treatment, potential side effects of these treatments and the inherent unpredictability of treatment. The patient's ability to understand, participate and engage in a conversation surrounding this was deemed to be: adequate.     ADDICTION COUNSELING AND MANAGEMENT:      Timely and targeted counseling is an important intervention in the treatment of substance use disorders.  Active and ongoing management is a hallmark of good clinical care.     Addiction counseling and management WAS employed during this encounter.     [x] The patient was counseled on abstinence from alcohol and substances of abuse (illicit and prescription).  [x] Harm reduction techniques were discussed, as warranted, to mitigate risk from problematic behaviors.  [x] Serial alcohol and drug laboratory testing (e.g. PETH, urine  "toxicology) is recommended to provide accountability, as well as to guide and refine substance abuse treatment moving forward.  [x] Relapse prevention and motivational interviewing was provided.  [x] Education was provided on 12 step recovery programs.    INTERVAL HISTORY AND ROS:     Today, pt presents to [facility] for first time since intake on 11/18/23; wasn't feeling well last week (see 11/9/23 telephone note), but those sx have since improved. Today, pt endorses severe (chronic) back pain; had an appointment with her orthopedic surgeon last week, and is awaiting phone call from his office on the next step recommendations (CT scan, injections, possibly another surgery in the near future). During the appointment last week, pt was prescribed Norco (Hydrocodone-Acetamin 5-325 Mg, #40, 10 day supply)  Robaxin and Vistaril 25-50mg q6-8hrs PRN anxiety. Hasn't ever been prescribed Vistaril for anxiety (or sleep) before, but has found it to be helpful. Didn't take Vistaril this morning, but did take Norco and Robaxin; didn't feel she'd be able to make it through all the ORP groups today otherwise due to severe pain.   Drowsy during interview, with some difficulty focusing at times 2/2 pain.   Denies withdrawal sx or cravings. Was unable to recall exact date she had her last drink, but states it's been "a while" and when asked if it was last Tuesday (11/7), pt agreed with this date and again denied any drinks over the past week. Has now completed Valium taper.   States that she hasn't gone to any AA meetings since starting ORP 2/2 not feeling well, but is hoping to go to one soon.   States that her  had her set up [employer] remote alcohol monitoring, which can sync to his phone. States that she knows her  cares and is worried about her, but states that she feels hurt that he doesn't trust her.   Endorses feeling depressed and anxious, most in setting of severe pain. Has been sleeping ok, and appetite has been " stable.     [facility] (Dr. Miguel Gonzalez)  [phone]  Called pt's orthopedic surgeon's office to discuss other options/alternatives for pain relief (pt is currently prescribed Norco), given pt's current active recovery and ORP admission. Unable to reach; LVM with callback number.     WITHDRAWAL SYMPTOMS: no  CRAVINGS: no    CURRENT PSYCHOTROPIC REGIMEN:   - (Lexapro 10mg daily), Abilify 10mg daily, Trazodone 150mg qhs              - Per pt, she hasn't been prescribed/taking Lexapro (was given while hospitalized); will reassess and consider starting/restarting for depressive sx and anxiety      PERTINENT PAST HISTORY AND CHART REVIEW:     Earl Abdul is a 63 y.o. female with past psychiatric history of tobacco abuse, alcohol abuse with seizures and complicated withdrawal, and depression with SA in 2017 & past pertinent medical history of breast cancer, HTN, HLD, fibromyalgia, sarcoidosis, hypothyroidism, T2DM, CLL (not on treatment) and COPD  presents  to ABU for Alcohol use disorder, severe, dependence.   Pt has had several hospital admissions for alcohol withdrawal, and has a hx of complicated withdrawal, including seizures, hallucinations. Has been to residential rehab several times, attended AA meetings, completed IOP in the past.   Most recent hospital admission was 11/4/23-11/6/23; see above. Pt had BAL of 211 on ED presentation.   Discharged from hospital on 11/6/23 on Valium taper. Last drink was 11/7/23 evening and has now completed Valium taper.       EXAMINATION:     BP (!) 113/57   Pulse 65   Temp 97.1 °F (36.2 °C)   Resp 18     MENTAL STATUS EXAMINATION:  General Appearance & Behavior: **  appears stated age, adequately groomed, appropriately dressed, appears uncomfortable at times 2/2 chronic back pain  Involuntary Movements and Motor Activity: **  no abnormal involuntary movements noted  Speech & Language: **  normal rate, rhythm, volume, tone, and pitch  Mood:  ""depressed because of the pain"  Affect: **  constricted  Thought Process & Associations: **   mostly linear; tangential at times  Thought Content & Perceptions: **  no suicidal or homicidal ideation, no auditory or visual hallucinations, no paranoid ideation, no ideas of reference, no evidence of delusions or psychosis  Sensorium and Cognition: **  drowsy, oriented fully (to person, place, time and situation), forgetful  Insight & Judgment: **  intact, demonstrates awareness of illness and adequate/appropriate behavior given the circumstances      RISK MANAGEMENT:     I[]I Y  I[x]I N  I[]I U  I[]I A  Suicidal Ideation/Behavior: **   I[]I Y  I[x]I N  I[]I U  I[]I A  Homicidal Ideation/Behavior: **  I[]I Y  I[x]I N  I[]I U  I[]I A  Violence: **  I[]I Y  I[x]I N  I[]I U  I[]I A  Self-Injurious Behavior: **    The patient is deemed to be a historian of unknown reliability and accuracy.    I[]I Y  I[x]I N  I[]I U  I[]I A  I[]I N/A  Minimization of Risk Parameters Suspected/Evident: **  I[]I Y  I[x]I N  I[]I U  I[]I A  I[]I N/A  Exaggeration of Risk Parameters Suspected/Evident: **      [] Y  [x] N  Danger to Self:   [] Y  [x] N  Danger to Others:   [] Y  [x] N  Grave Disability:     The patient does not currently meet the criteria for psychiatric admission and can be safely and effectively managed in a less restrictive level of care.    Add-On Psychotherapy:     [phone] Add-On Psychotherapy: 30 minutes (range 16-37 minutes)    NOTE: The time spent delivering Add-On Psychotherapy is separate from and in addition to the total time spent performing E/M services on the date of the encounter.    Duration of Intervention: 16 minutes  Primary Focus: alcohol abuse, depression, anxiety  Participants: patient  Therapeutic Intervention Type: supportive psychotherapy  Why Chosen Therapy is Appropriate Versus Another Modality: relevance, effectiveness of approach  Target Symptoms Addressed: alcohol abuse, depression, " anxiety   Topics and Themes Discussed: treatment compliance, coping mechanisms, problematic substance use  Psychotherapeutic Techniques Employed: active listening, motivational interviewing, and relapse prevention  Outcome Monitoring Methods: self-report, lab data, observation, feedback from family  Response to the Intervention: accepting  Patient Progress Toward Treatment Goals: limited       In cases of emergency, daily coverage provided by Acute/ER Psych MD, NP, PA, or SW, with contact numbers located in [facility] On Call Schedule.    Elli Smith  Department of Psychiatry  [facility]        KEY:     I[]I Y = Yes / Present / Endorses  I[]I N = No / Absent / Denies  I[]I U = Unknown / Unable to Assess / Unwilling to Participate  I[]I A = Ambiguity Exists / Accuracy Uncertain  I[]I D = Denial or Minimization is Suspected/Evident  I[]I N/A = Non-Applicable    CHART REVIEW:     Available documentation has been reviewed, and pertinent elements of the chart - including previous psychiatric evaluations - have been incorporated into this evaluation where appropriate.    ADVICE AND COUNSELING:     [x] In cases of emergencies (e.g. SI/HI resulting in danger to self or others, functioning deteriorates to the level of grave disability), call 911 or [phone], or present to the emergency department for immediate assistance.  [x] Patient should not operate a motor vehicle or heavy machinery if effects of medications or underlying symptoms/condition impair the ability to safely do so.    Alcohol, Tobacco, and Drug Counseling, as well as resources, has been provided, as warranted.     Shared medical decision making and informed consent are the hallmark and bedrock of good clinical care, and as such have been employed and obtained, respectively, to the degree possible.      Risk Mitigation Strategies, Harm Reduction Techniques, and Safety Netting are important interventions that can reduce acute and  chronic risk, and as such have been employed to the degree possible.    Prescription Drug Management entails the review, recommendation, or consideration without recommendation of medications, and as such was employed during the encounter.    Additional Psychoeducation has been provided, as warranted.    Discussed, to the extent possible, diagnosis, risks and benefits of proposed treatment vs alternative treatments vs no treatment, potential side effects of these treatments and the inherent unpredictability of treatment. The patient expresses understanding of the above and displays the capacity to agree with this treatment given said understanding. Patient also agrees that, currently, the benefits outweigh the risks and consents to treatment at this time.     Written material has been provided to supplement, augment, and reinforce any discussions and interventions, via the AVS or other pre-printed handouts, as warranted.      DIAGNOSTIC TESTING:     The chart was reviewed for recent diagnostic procedures and investigations, and pertinent results are noted below.    Na 138  11/6/2023   K 3.6  11/6/2023   Ca 9.1  11/6/2023  Phos 2.9  11/6/2023   Mg 1.4 (L)  11/6/2023     Glu 154 (H)  11/6/2023   HgA1c 5.5  11/4/2023    BUN 7 (L)  11/6/2023   Cr 0.8  11/6/2023   GFR >60.0  11/6/2023   Specific [place] 1.015  11/4/2023   Protein (Urine) Negative  11/4/2023   Microalbumin *   *     T Prot 6.9  11/6/2023   Alb 4.1  11/6/2023   T Bili 0.5  11/6/2023   Alk Phos 60  11/6/2023   AST 37  11/6/2023   ALT 33  11/6/2023   GGT *   *   NH3 29  4/6/2023   Amylase *   *   Lipase 21  9/16/2023    TSH 0.168 (L)  11/4/2023   Free T4 1.17  11/4/2023  PTH *   *  Prolactin *   *   CPK 90  11/4/2023   Troponin I <0.006  9/17/2023   PT 10.7  9/17/2023   INR 1.0  9/17/2023    WBC 7.05  11/6/2023   RBC 3.77 (L)  11/6/2023   Hgb 9.9 (L)  11/6/2023   HCT 31.8 (L)  11/6/2023   MCV 84  11/6/2023  PLT 93 (L)   11/6/2023   ANC 1.5; 21.3 (L);  (L)  11/6/2023    Cholesterol 184  4/6/2023   Triglycerides 161 (H)  4/6/2023   .8  4/6/2023   HDL 44  4/6/2023     B12 368  6/6/2023   Folate 13.3  4/6/2023   Thiamine 136 (H)  *   Vit D *   *     HIV 1/2 Ag/Ab Non-reactive  3/10/2023   Hep B Surface *   *   Hep B Core *   *   Hep A *   *   Hep C Non-reactive  3/10/2023   RPR *   *     Lithium *   *   VPA *   *   Clozapine *   *     Alcohol 221 (A)  9/16/2023   Benzodiazepines Negative  11/4/2023   Barbiturates Negative  11/4/2023   Cannabis Negative  11/4/2023   Cocaine Negative  11/4/2023   Amphetamines Negative  11/4/2023   PCP Negative  11/4/2023   Opiates Negative  11/4/2023   Methadone Negative  11/4/2023   Buprenorphine *   *   Fentanyl Not Detected  7/18/2023   Oxycodone Presumptive Positive (A)  7/18/2023   Tramadol *   *     Ethanol 211 (H)  11/4/2023  PETH 1099  6/16/2023   EtG *   *   EtS *   *   Buprenorphine *   *   Norbuprenorphine *   *

## 2023-11-14 ENCOUNTER — TELEPHONE (OUTPATIENT)
Dept: HEMATOLOGY/ONCOLOGY | Facility: CLINIC | Age: 63
End: 2023-11-14
Payer: COMMERCIAL

## 2023-11-14 ENCOUNTER — HOSPITAL ENCOUNTER (OUTPATIENT)
Dept: PSYCHIATRY | Facility: HOSPITAL | Age: 63
Discharge: HOME | End: 2023-11-14
Attending: STUDENT IN AN ORGANIZED HEALTH CARE EDUCATION/TRAINING PROGRAM
Payer: COMMERCIAL

## 2023-11-14 DIAGNOSIS — F10.20 ALCOHOL USE DISORDER, SEVERE, DEPENDENCE: Primary | ICD-10-CM

## 2023-11-14 LAB — ETHYL GLUCURONIDE: NEGATIVE NG/ML

## 2023-11-14 PROCEDURE — 90853 GROUP PSYCHOTHERAPY: CPT | Mod: ,,, | Performed by: PSYCHOLOGIST

## 2023-11-14 PROCEDURE — 90853 GROUP PSYCHOTHERAPY: CPT

## 2023-11-14 PROCEDURE — 90853 PR GROUP PSYCHOTHERAPY: ICD-10-PCS | Mod: ,,, | Performed by: PSYCHOLOGIST

## 2023-11-14 PROCEDURE — 90853 GROUP PSYCHOTHERAPY: CPT | Performed by: SOCIAL WORKER

## 2023-11-14 NOTE — PROGRESS NOTES
Group Psychotherapy (PhD/LCSW)    Site: [facility]    Clinical status of patient: Intensive Outpatient Program (IOP)    Date: 11/14/2023    Group Focus: Interpersonal Effectiveness      Length of service: 16226 - 45-50 minutes    Number of patients in attendance: 10    Referred by: Addictive Behavior Unit Treatment Team    Target symptoms: Alcohol Abuse    Patient's response to treatment: Active Listening and Feedback given to other patients.     Progress toward goals: Progressing adequately    Interval History: Session focus was Interpersonal Effectiveness: Validation. Patients were encouraged to focus on validation of others by enhancing their ability to hear, understand, and respect others. Patients practiced validation of others through role-play and examples.      Diagnosis:     ICD-10-CM ICD-9-CM   1. Alcohol use disorder, severe, dependence  F10.20 303.90     Plan: Continue treatment on ORP

## 2023-11-14 NOTE — TELEPHONE ENCOUNTER
----- Message from Pat Soto sent at 11/14/2023 12:09 PM CST -----  Regarding: Appt Access  Contact: pt [phone]  Pt calling to schedule follow up visit . Pls call

## 2023-11-14 NOTE — TELEPHONE ENCOUNTER
Attempted to reach patient regarding scheduling clinical breast exam.   LVM with call back number.

## 2023-11-14 NOTE — PLAN OF CARE
11/14/23 1448   Activity/Group Therapy Checklist   Group Goals/Reflection   Attendance Attended   Follows Direction Followed directions   Group Interactions/Observations Interacted appropriately;Alert;Sharing   Affect/Mood Range Normal range   Affect/Mood Display Appropriate   Goal Progression Progressing

## 2023-11-14 NOTE — TELEPHONE ENCOUNTER
----- Message from Liz Tang RN sent at 11/14/2023  2:26 PM CST -----  Regarding: FW: Appt Access  Contact: pt [phone]  It looks like pt has been seeing Dr. Montano.     Fu: cbc, cmp, ldh, and MD appt in 6 months     ----- Message -----  From: Pat Soto  Sent: 11/14/2023  12:12 PM CST  To: [facility] Staff  Subject: Appt Access                                      Pt calling to schedule follow up visit . Pls call

## 2023-11-15 LAB
CLINICAL BIOCHEMIST REVIEW: NORMAL
PLPETH BLD-MCNC: 273 NG/ML
POPETH BLD-MCNC: 301 NG/ML

## 2023-11-15 NOTE — PLAN OF CARE
11/14/23 1100   Activity/Group Therapy Checklist   Group Relapse Prevention   Attendance Attended   Follows Direction Followed directions   Group Interactions/Observations Interacted appropriately

## 2023-11-21 NOTE — PROGRESS NOTES
Group Psychotherapy (PhD/LCSW)    Site: [facility]    Clinical status of patient: Intensive Outpatient Program (IOP)    Date: 11/13/2023    Group Focus: Sleep 101    Length of service: 75933 - 45-50 minutes    Number of patients in attendance: 6    Referred by: [facility]     Target symptoms: Alcohol Abuse    Patient's response to treatment: Active Listening, Self-disclosure, and Frequent Questions    Progress toward goals: Progressing adequately    Interval History: STIMULUS CONTROL and SLEEP COMPRESSION   Session focus was on stimulus control and sleep compression. Clinician and patient discussed associating beds with wakefulness and how to disrupt the connection. Clinician also discussed the use of sleep compression to improve sleep quality and stimulate sleep drive. Patients reviewed factors contributing to sleep hygiene.  Patients reviewed sleep diaries and strategies for implementing stimulus control and sleep compression.     Diagnosis:     ICD-10-CM ICD-9-CM   1. Alcohol use disorder, severe, dependence  F10.20 303.90   2. Depression with anxiety  F41.8 300.4   3. 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  Given the location of the defect and the proximity to free margins a dorsal nasal flap was deemed most appropriate.  Using a sterile surgical marker, an appropriate dorsal nasal flap was drawn around the defect.    The area thus outlined was incised deep to adipose tissue with a #15 scalpel blade.  The skin margins were undermined to an appropriate distance in all directions utilizing iris scissors.

## 2023-11-23 ENCOUNTER — HOSPITAL ENCOUNTER (INPATIENT)
Facility: HOSPITAL | Age: 63
LOS: 5 days | Discharge: HOME | DRG: 605 | End: 2023-11-29
Attending: EMERGENCY MEDICINE | Admitting: HOSPITALIST
Payer: COMMERCIAL

## 2023-11-23 DIAGNOSIS — R09.02 HYPOXIA: ICD-10-CM

## 2023-11-23 DIAGNOSIS — R07.9 CHEST PAIN: ICD-10-CM

## 2023-11-23 DIAGNOSIS — F10.920 ALCOHOLIC INTOXICATION WITHOUT COMPLICATION: ICD-10-CM

## 2023-11-23 DIAGNOSIS — E87.29 ALCOHOLIC KETOACIDOSIS: Primary | ICD-10-CM

## 2023-11-23 PROBLEM — Z17.0 MALIGNANT NEOPLASM OF CENTRAL PORTION OF RIGHT BREAST IN FEMALE, ESTROGEN RECEPTOR POSITIVE: Chronic | Status: ACTIVE | Noted: 2020-11-18

## 2023-11-23 PROBLEM — C50.111 MALIGNANT NEOPLASM OF CENTRAL PORTION OF RIGHT BREAST IN FEMALE, ESTROGEN RECEPTOR POSITIVE: Chronic | Status: ACTIVE | Noted: 2020-11-18

## 2023-11-23 PROBLEM — C91.10 CLL (CHRONIC LYMPHOCYTIC LEUKEMIA): Chronic | Status: ACTIVE | Noted: 2022-09-30

## 2023-11-23 PROBLEM — E87.5 HYPERKALEMIA: Status: ACTIVE | Noted: 2023-11-23
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
## 2023-11-23 LAB
ALBUMIN SERPL BCP-MCNC: 4.4 G/DL (ref 3.5–5.2)
ALLENS TEST: ABNORMAL
ALP SERPL-CCNC: 69 U/L (ref 55–135)
ALT SERPL W/O P-5'-P-CCNC: 38 U/L (ref 10–44)
ANION GAP SERPL CALC-SCNC: 24 MMOL/L (ref 8–16)
ANISOCYTOSIS BLD QL SMEAR: SLIGHT
APAP SERPL-MCNC: <3 UG/ML (ref 10–20)
AST SERPL-CCNC: 79 U/L (ref 10–40)
B-OH-BUTYR BLD STRIP-SCNC: 5.2 MMOL/L (ref 0–0.5)
BASOPHILS # BLD AUTO: ABNORMAL K/UL (ref 0–0.2)
BASOPHILS NFR BLD: 0 % (ref 0–1.9)
BILIRUB SERPL-MCNC: 0.6 MG/DL (ref 0.1–1)
BUN SERPL-MCNC: 12 MG/DL (ref 8–23)
CALCIUM SERPL-MCNC: 8.9 MG/DL (ref 8.7–10.5)
CHLORIDE SERPL-SCNC: 101 MMOL/L (ref 95–110)
CO2 SERPL-SCNC: 18 MMOL/L (ref 23–29)
CREAT SERPL-MCNC: 0.8 MG/DL (ref 0.5–1.4)
DIFFERENTIAL METHOD: ABNORMAL
EOSINOPHIL # BLD AUTO: ABNORMAL K/UL (ref 0–0.5)
EOSINOPHIL NFR BLD: 0 % (ref 0–8)
ERYTHROCYTE [DISTWIDTH] IN BLOOD BY AUTOMATED COUNT: 18.8 % (ref 11.5–14.5)
EST. GFR  (NO RACE VARIABLE): >60 ML/MIN/1.73 M^2
ETHANOL SERPL-MCNC: 279 MG/DL
GLUCOSE SERPL-MCNC: 59 MG/DL (ref 70–110)
HCO3 UR-SCNC: 22.3 MMOL/L (ref 24–28)
HCT VFR BLD AUTO: 38.4 % (ref 37–48.5)
HGB BLD-MCNC: 11.7 G/DL (ref 12–16)
HYPOCHROMIA BLD QL SMEAR: ABNORMAL
IMM GRANULOCYTES # BLD AUTO: ABNORMAL K/UL (ref 0–0.04)
IMM GRANULOCYTES NFR BLD AUTO: ABNORMAL % (ref 0–0.5)
LACTATE SERPL-SCNC: 2.1 MMOL/L (ref 0.5–2.2)
LYMPHOCYTES # BLD AUTO: ABNORMAL K/UL (ref 1–4.8)
LYMPHOCYTES NFR BLD: 81 % (ref 18–48)
MCH RBC QN AUTO: 26.2 PG (ref 27–31)
MCHC RBC AUTO-ENTMCNC: 30.5 G/DL (ref 32–36)
MCV RBC AUTO: 86 FL (ref 82–98)
MONOCYTES # BLD AUTO: ABNORMAL K/UL (ref 0.3–1)
MONOCYTES NFR BLD: 1 % (ref 4–15)
NEUTROPHILS NFR BLD: 18 % (ref 38–73)
NRBC BLD-RTO: 0 /100 WBC
PCO2 BLDA: 37.1 MMHG (ref 35–45)
PH SMN: 7.39 [PH] (ref 7.35–7.45)
PLATELET # BLD AUTO: 105 K/UL (ref 150–450)
PLATELET BLD QL SMEAR: ABNORMAL
PMV BLD AUTO: 9.2 FL (ref 9.2–12.9)
PO2 BLDA: 118 MMHG (ref 40–60)
POC BE: -3 MMOL/L
POC SATURATED O2: 99 % (ref 95–100)
POC TCO2: 23 MMOL/L (ref 24–29)
POIKILOCYTOSIS BLD QL SMEAR: SLIGHT
POLYCHROMASIA BLD QL SMEAR: ABNORMAL
POTASSIUM SERPL-SCNC: 5.3 MMOL/L (ref 3.5–5.1)
PROT SERPL-MCNC: 7.7 G/DL (ref 6–8.4)
RBC # BLD AUTO: 4.46 M/UL (ref 4–5.4)
SALICYLATES SERPL-MCNC: <5 MG/DL (ref 15–30)
SAMPLE: ABNORMAL
SITE: ABNORMAL
SODIUM SERPL-SCNC: 143 MMOL/L (ref 136–145)
SPHEROCYTES BLD QL SMEAR: ABNORMAL
TSH SERPL DL<=0.005 MIU/L-ACNC: 0.75 UIU/ML (ref 0.4–4)
WBC # BLD AUTO: 34.03 K/UL (ref 3.9–12.7)

## 2023-11-23 PROCEDURE — 85027 COMPLETE CBC AUTOMATED: CPT | Performed by: EMERGENCY MEDICINE

## 2023-11-23 PROCEDURE — 84443 ASSAY THYROID STIM HORMONE: CPT | Performed by: EMERGENCY MEDICINE

## 2023-11-23 PROCEDURE — S4991 NICOTINE PATCH NONLEGEND: HCPCS | Performed by: EMERGENCY MEDICINE

## 2023-11-23 PROCEDURE — 82010 KETONE BODYS QUAN: CPT | Performed by: EMERGENCY MEDICINE

## 2023-11-23 PROCEDURE — 96374 THER/PROPH/DIAG INJ IV PUSH: CPT

## 2023-11-23 PROCEDURE — 83605 ASSAY OF LACTIC ACID: CPT | Performed by: EMERGENCY MEDICINE

## 2023-11-23 PROCEDURE — 27000221 HC OXYGEN, UP TO 24 HOURS

## 2023-11-23 PROCEDURE — G0378 HOSPITAL OBSERVATION PER HR: HCPCS

## 2023-11-23 PROCEDURE — 99285 EMERGENCY DEPT VISIT HI MDM: CPT | Mod: 25

## 2023-11-23 PROCEDURE — 85007 BL SMEAR W/DIFF WBC COUNT: CPT | Mod: NCS | Performed by: EMERGENCY MEDICINE

## 2023-11-23 PROCEDURE — 25000003 PHARM REV CODE 250: Performed by: EMERGENCY MEDICINE

## 2023-11-23 PROCEDURE — 94761 N-INVAS EAR/PLS OXIMETRY MLT: CPT

## 2023-11-23 PROCEDURE — 80053 COMPREHEN METABOLIC PANEL: CPT | Performed by: EMERGENCY MEDICINE

## 2023-11-23 PROCEDURE — 96372 THER/PROPH/DIAG INJ SC/IM: CPT

## 2023-11-23 PROCEDURE — 82077 ASSAY SPEC XCP UR&BREATH IA: CPT | Performed by: EMERGENCY MEDICINE

## 2023-11-23 PROCEDURE — 80179 DRUG ASSAY SALICYLATE: CPT | Performed by: EMERGENCY MEDICINE

## 2023-11-23 PROCEDURE — 25000003 PHARM REV CODE 250

## 2023-11-23 PROCEDURE — 94640 AIRWAY INHALATION TREATMENT: CPT

## 2023-11-23 PROCEDURE — 80143 DRUG ASSAY ACETAMINOPHEN: CPT | Performed by: EMERGENCY MEDICINE

## 2023-11-23 PROCEDURE — 25000242 PHARM REV CODE 250 ALT 637 W/ HCPCS: Performed by: EMERGENCY MEDICINE

## 2023-11-23 PROCEDURE — 63600175 PHARM REV CODE 636 W HCPCS

## 2023-11-23 RX ORDER — NALOXONE HCL 0.4 MG/ML
0.02 VIAL (ML) INJECTION
Status: DISCONTINUED | OUTPATIENT
Start: 2023-11-23 | End: 2023-11-29 | Stop reason: HOSPADM

## 2023-11-23 RX ORDER — GLUCAGON 1 MG
1 KIT INJECTION
Status: DISCONTINUED | OUTPATIENT
Start: 2023-11-23 | End: 2023-11-29 | Stop reason: HOSPADM

## 2023-11-23 RX ORDER — PANTOPRAZOLE SODIUM 40 MG/1
40 TABLET, DELAYED RELEASE ORAL DAILY
Status: DISCONTINUED | OUTPATIENT
Start: 2023-11-24 | End: 2023-11-29 | Stop reason: HOSPADM

## 2023-11-23 RX ORDER — DIAZEPAM 5 MG/1
10 TABLET ORAL EVERY 8 HOURS
Status: DISCONTINUED | OUTPATIENT
Start: 2023-11-23 | End: 2023-11-24

## 2023-11-23 RX ORDER — INSULIN ASPART 100 [IU]/ML
0-5 INJECTION, SOLUTION INTRAVENOUS; SUBCUTANEOUS
Status: DISCONTINUED | OUTPATIENT
Start: 2023-11-23 | End: 2023-11-27

## 2023-11-23 RX ORDER — THIAMINE HCL 100 MG
100 TABLET ORAL DAILY
Status: DISCONTINUED | OUTPATIENT
Start: 2023-11-24 | End: 2023-11-24

## 2023-11-23 RX ORDER — IBUPROFEN 200 MG
24 TABLET ORAL
Status: DISCONTINUED | OUTPATIENT
Start: 2023-11-23 | End: 2023-11-29 | Stop reason: HOSPADM

## 2023-11-23 RX ORDER — FOLIC ACID 1 MG/1
1 TABLET ORAL DAILY
Status: DISCONTINUED | OUTPATIENT
Start: 2023-11-24 | End: 2023-11-23

## 2023-11-23 RX ORDER — DIAZEPAM 10 MG/2ML
5 INJECTION INTRAMUSCULAR EVERY 4 HOURS PRN
Status: DISCONTINUED | OUTPATIENT
Start: 2023-11-23 | End: 2023-11-23

## 2023-11-23 RX ORDER — CLONIDINE HYDROCHLORIDE 0.1 MG/1
0.1 TABLET ORAL DAILY
Status: DISCONTINUED | OUTPATIENT
Start: 2023-11-24 | End: 2023-11-24

## 2023-11-23 RX ORDER — ATORVASTATIN CALCIUM 10 MG/1
10 TABLET, FILM COATED ORAL DAILY
Status: DISCONTINUED | OUTPATIENT
Start: 2023-11-24 | End: 2023-11-24

## 2023-11-23 RX ORDER — SODIUM CHLORIDE 0.9 % (FLUSH) 0.9 %
10 SYRINGE (ML) INJECTION EVERY 12 HOURS PRN
Status: DISCONTINUED | OUTPATIENT
Start: 2023-11-23 | End: 2023-11-23

## 2023-11-23 RX ORDER — SODIUM CHLORIDE 0.9 % (FLUSH) 0.9 %
10 SYRINGE (ML) INJECTION
Status: DISCONTINUED | OUTPATIENT
Start: 2023-11-23 | End: 2023-11-29 | Stop reason: HOSPADM

## 2023-11-23 RX ORDER — ONDANSETRON 2 MG/ML
4 INJECTION INTRAMUSCULAR; INTRAVENOUS EVERY 8 HOURS PRN
Status: DISCONTINUED | OUTPATIENT
Start: 2023-11-23 | End: 2023-11-24

## 2023-11-23 RX ORDER — THIAMINE HCL 100 MG
100 TABLET ORAL DAILY
Status: DISCONTINUED | OUTPATIENT
Start: 2023-11-23 | End: 2023-11-23

## 2023-11-23 RX ORDER — DIAZEPAM 5 MG/1
10 TABLET ORAL EVERY 6 HOURS PRN
Status: DISCONTINUED | OUTPATIENT
Start: 2023-11-23 | End: 2023-11-23

## 2023-11-23 RX ORDER — METOPROLOL SUCCINATE 50 MG/1
50 TABLET, EXTENDED RELEASE ORAL DAILY
Status: DISCONTINUED | OUTPATIENT
Start: 2023-11-24 | End: 2023-11-24

## 2023-11-23 RX ORDER — IBUPROFEN 200 MG
1 TABLET ORAL DAILY
Status: DISCONTINUED | OUTPATIENT
Start: 2023-11-23 | End: 2023-11-29 | Stop reason: HOSPADM

## 2023-11-23 RX ORDER — IBUPROFEN 200 MG
16 TABLET ORAL
Status: DISCONTINUED | OUTPATIENT
Start: 2023-11-23 | End: 2023-11-29 | Stop reason: HOSPADM

## 2023-11-23 RX ORDER — IPRATROPIUM BROMIDE AND ALBUTEROL SULFATE 2.5; .5 MG/3ML; MG/3ML
3 SOLUTION RESPIRATORY (INHALATION)
Status: COMPLETED | OUTPATIENT
Start: 2023-11-23 | End: 2023-11-23

## 2023-11-23 RX ORDER — TALC
6 POWDER (GRAM) TOPICAL NIGHTLY PRN
Status: DISCONTINUED | OUTPATIENT
Start: 2023-11-23 | End: 2023-11-27

## 2023-11-23 RX ORDER — FOLIC ACID 1 MG/1
1 TABLET ORAL DAILY
Status: DISCONTINUED | OUTPATIENT
Start: 2023-11-23 | End: 2023-11-29 | Stop reason: HOSPADM

## 2023-11-23 RX ORDER — FLUTICASONE FUROATE AND VILANTEROL 100; 25 UG/1; UG/1
1 POWDER RESPIRATORY (INHALATION) DAILY
Status: DISCONTINUED | OUTPATIENT
Start: 2023-11-24 | End: 2023-11-29 | Stop reason: HOSPADM

## 2023-11-23 RX ORDER — ACETAMINOPHEN 325 MG/1
650 TABLET ORAL EVERY 4 HOURS PRN
Status: DISCONTINUED | OUTPATIENT
Start: 2023-11-23 | End: 2023-11-29 | Stop reason: HOSPADM

## 2023-11-23 RX ORDER — ENOXAPARIN SODIUM 100 MG/ML
40 INJECTION SUBCUTANEOUS EVERY 24 HOURS
Status: DISCONTINUED | OUTPATIENT
Start: 2023-11-23 | End: 2023-11-29 | Stop reason: HOSPADM

## 2023-11-23 RX ORDER — ANASTROZOLE 1 MG/1
1 TABLET ORAL DAILY
Status: DISCONTINUED | OUTPATIENT
Start: 2023-11-24 | End: 2023-11-24

## 2023-11-23 RX ORDER — LORAZEPAM 2 MG/ML
2 INJECTION INTRAMUSCULAR
Status: DISCONTINUED | OUTPATIENT
Start: 2023-11-23 | End: 2023-11-29 | Stop reason: HOSPADM

## 2023-11-23 RX ADMIN — NICOTINE 1 PATCH: 14 PATCH, EXTENDED RELEASE TRANSDERMAL at 07:11

## 2023-11-23 RX ADMIN — FOLIC ACID 1 MG: 1 TABLET ORAL at 09:11

## 2023-11-23 RX ADMIN — LORAZEPAM 2 MG: 2 INJECTION INTRAMUSCULAR; INTRAVENOUS at 09:11

## 2023-11-23 RX ADMIN — DIAZEPAM 10 MG: 5 TABLET ORAL at 09:11

## 2023-11-23 RX ADMIN — IPRATROPIUM BROMIDE AND ALBUTEROL SULFATE 3 ML: .5; 3 SOLUTION RESPIRATORY (INHALATION) at 07:11

## 2023-11-23 RX ADMIN — SODIUM ZIRCONIUM CYCLOSILICATE 5 G: 5 POWDER, FOR SUSPENSION ORAL at 10:11

## 2023-11-23 RX ADMIN — ENOXAPARIN SODIUM 40 MG: 40 INJECTION SUBCUTANEOUS at 10:11

## 2023-11-24 PROBLEM — R65.10 SIRS (SYSTEMIC INFLAMMATORY RESPONSE SYNDROME): Status: ACTIVE | Noted: 2023-11-24

## 2023-11-24 PROBLEM — R33.9 URINARY RETENTION: Status: ACTIVE | Noted: 2023-11-24

## 2023-11-24 LAB
ALBUMIN SERPL BCP-MCNC: 3.8 G/DL (ref 3.5–5.2)
ALP SERPL-CCNC: 63 U/L (ref 55–135)
ALT SERPL W/O P-5'-P-CCNC: 36 U/L (ref 10–44)
AMPHET+METHAMPHET UR QL: NEGATIVE
ANION GAP SERPL CALC-SCNC: 19 MMOL/L (ref 8–16)
AST SERPL-CCNC: 61 U/L (ref 10–40)
BACTERIA #/AREA URNS AUTO: NORMAL /HPF
BARBITURATES UR QL SCN>200 NG/ML: NEGATIVE
BASOPHILS # BLD AUTO: 0.04 K/UL (ref 0–0.2)
BASOPHILS NFR BLD: 0.2 % (ref 0–1.9)
BENZODIAZ UR QL SCN>200 NG/ML: ABNORMAL
BILIRUB SERPL-MCNC: 0.5 MG/DL (ref 0.1–1)
BILIRUB UR QL STRIP: NEGATIVE
BUN SERPL-MCNC: 10 MG/DL (ref 8–23)
BZE UR QL SCN: NEGATIVE
CALCIUM SERPL-MCNC: 8.3 MG/DL (ref 8.7–10.5)
CANNABINOIDS UR QL SCN: NEGATIVE
CHLORIDE SERPL-SCNC: 100 MMOL/L (ref 95–110)
CLARITY UR REFRACT.AUTO: CLEAR
CO2 SERPL-SCNC: 22 MMOL/L (ref 23–29)
COLOR UR AUTO: YELLOW
CREAT SERPL-MCNC: 0.7 MG/DL (ref 0.5–1.4)
CREAT UR-MCNC: 175 MG/DL (ref 15–325)
DIFFERENTIAL METHOD: ABNORMAL
EOSINOPHIL # BLD AUTO: 0.1 K/UL (ref 0–0.5)
EOSINOPHIL NFR BLD: 0.5 % (ref 0–8)
ERYTHROCYTE [DISTWIDTH] IN BLOOD BY AUTOMATED COUNT: 18.6 % (ref 11.5–14.5)
EST. GFR  (NO RACE VARIABLE): >60 ML/MIN/1.73 M^2
GLUCOSE SERPL-MCNC: 54 MG/DL (ref 70–110)
GLUCOSE UR QL STRIP: NEGATIVE
HCT VFR BLD AUTO: 32.4 % (ref 37–48.5)
HGB BLD-MCNC: 9.8 G/DL (ref 12–16)
HGB UR QL STRIP: NEGATIVE
HYALINE CASTS UR QL AUTO: 0 /LPF
IMM GRANULOCYTES # BLD AUTO: 0.06 K/UL (ref 0–0.04)
IMM GRANULOCYTES NFR BLD AUTO: 0.4 % (ref 0–0.5)
KETONES UR QL STRIP: ABNORMAL
LEUKOCYTE ESTERASE UR QL STRIP: NEGATIVE
LYMPHOCYTES # BLD AUTO: 12.5 K/UL (ref 1–4.8)
LYMPHOCYTES NFR BLD: 76.9 % (ref 18–48)
MAGNESIUM SERPL-MCNC: 1.5 MG/DL (ref 1.6–2.6)
MCH RBC QN AUTO: 26.3 PG (ref 27–31)
MCHC RBC AUTO-ENTMCNC: 30.2 G/DL (ref 32–36)
MCV RBC AUTO: 87 FL (ref 82–98)
METHADONE UR QL SCN>300 NG/ML: NEGATIVE
MICROSCOPIC COMMENT: NORMAL
MONOCYTES # BLD AUTO: 0.6 K/UL (ref 0.3–1)
MONOCYTES NFR BLD: 3.7 % (ref 4–15)
NEUTROPHILS # BLD AUTO: 3 K/UL (ref 1.8–7.7)
NEUTROPHILS NFR BLD: 18.3 % (ref 38–73)
NITRITE UR QL STRIP: NEGATIVE
NRBC BLD-RTO: 0 /100 WBC
OPIATES UR QL SCN: NEGATIVE
PCP UR QL SCN>25 NG/ML: NEGATIVE
PH UR STRIP: 6 [PH] (ref 5–8)
PHOSPHATE SERPL-MCNC: 2.1 MG/DL (ref 2.7–4.5)
PLATELET # BLD AUTO: 88 K/UL (ref 150–450)
PMV BLD AUTO: 9.5 FL (ref 9.2–12.9)
POCT GLUCOSE: 109 MG/DL (ref 70–110)
POCT GLUCOSE: 174 MG/DL (ref 70–110)
POCT GLUCOSE: 60 MG/DL (ref 70–110)
POCT GLUCOSE: 88 MG/DL (ref 70–110)
POCT GLUCOSE: 91 MG/DL (ref 70–110)
POTASSIUM SERPL-SCNC: 3.7 MMOL/L (ref 3.5–5.1)
PROT SERPL-MCNC: 6.5 G/DL (ref 6–8.4)
PROT UR QL STRIP: ABNORMAL
RBC # BLD AUTO: 3.72 M/UL (ref 4–5.4)
RBC #/AREA URNS AUTO: 1 /HPF (ref 0–4)
SARS-COV-2 RNA RESP QL NAA+PROBE: NOT DETECTED
SODIUM SERPL-SCNC: 141 MMOL/L (ref 136–145)
SP GR UR STRIP: 1.03 (ref 1–1.03)
TOXICOLOGY INFORMATION: ABNORMAL
URN SPEC COLLECT METH UR: ABNORMAL
WBC # BLD AUTO: 16.29 K/UL (ref 3.9–12.7)
WBC #/AREA URNS AUTO: 4 /HPF (ref 0–5)

## 2023-11-24 PROCEDURE — 87635 SARS-COV-2 COVID-19 AMP PRB: CPT

## 2023-11-24 PROCEDURE — 80053 COMPREHEN METABOLIC PANEL: CPT

## 2023-11-24 PROCEDURE — 36415 COLL VENOUS BLD VENIPUNCTURE: CPT

## 2023-11-24 PROCEDURE — 90792 PSYCH DIAG EVAL W/MED SRVCS: CPT | Mod: ,,, | Performed by: PSYCHIATRY & NEUROLOGY

## 2023-11-24 PROCEDURE — 63600175 PHARM REV CODE 636 W HCPCS

## 2023-11-24 PROCEDURE — 25000003 PHARM REV CODE 250: Performed by: EMERGENCY MEDICINE

## 2023-11-24 PROCEDURE — 85025 COMPLETE CBC W/AUTO DIFF WBC: CPT

## 2023-11-24 PROCEDURE — 25000003 PHARM REV CODE 250

## 2023-11-24 PROCEDURE — S4991 NICOTINE PATCH NONLEGEND: HCPCS | Performed by: EMERGENCY MEDICINE

## 2023-11-24 PROCEDURE — 51798 US URINE CAPACITY MEASURE: CPT

## 2023-11-24 PROCEDURE — 94640 AIRWAY INHALATION TREATMENT: CPT

## 2023-11-24 PROCEDURE — 27000221 HC OXYGEN, UP TO 24 HOURS

## 2023-11-24 PROCEDURE — 94761 N-INVAS EAR/PLS OXIMETRY MLT: CPT

## 2023-11-24 PROCEDURE — 83735 ASSAY OF MAGNESIUM: CPT

## 2023-11-24 PROCEDURE — 25000242 PHARM REV CODE 250 ALT 637 W/ HCPCS

## 2023-11-24 PROCEDURE — 90792 PR PSYCHIATRIC DIAGNOSTIC EVALUATION W/MEDICAL SERVICES: ICD-10-PCS | Mod: ,,, | Performed by: PSYCHIATRY & NEUROLOGY

## 2023-11-24 PROCEDURE — 99900035 HC TECH TIME PER 15 MIN (STAT)

## 2023-11-24 PROCEDURE — 84100 ASSAY OF PHOSPHORUS: CPT

## 2023-11-24 PROCEDURE — 80307 DRUG TEST PRSMV CHEM ANLYZR: CPT | Performed by: EMERGENCY MEDICINE

## 2023-11-24 PROCEDURE — 81001 URINALYSIS AUTO W/SCOPE: CPT | Mod: XB | Performed by: EMERGENCY MEDICINE

## 2023-11-24 PROCEDURE — 11000001 HC ACUTE MED/SURG PRIVATE ROOM

## 2023-11-24 RX ORDER — DICLOFENAC SODIUM 75 MG/1
75 TABLET, DELAYED RELEASE ORAL 2 TIMES DAILY PRN
Status: ON HOLD | COMMUNITY
End: 2023-11-29 | Stop reason: HOSPADM

## 2023-11-24 RX ORDER — ONDANSETRON 4 MG/1
4 TABLET, FILM COATED ORAL EVERY 6 HOURS PRN
Status: DISCONTINUED | OUTPATIENT
Start: 2023-11-24 | End: 2023-11-29 | Stop reason: HOSPADM

## 2023-11-24 RX ORDER — ONDANSETRON 8 MG/1
8 TABLET, ORALLY DISINTEGRATING ORAL 3 TIMES DAILY PRN
Status: ON HOLD | COMMUNITY
End: 2023-11-29 | Stop reason: HOSPADM

## 2023-11-24 RX ORDER — LOSARTAN POTASSIUM 50 MG/1
100 TABLET ORAL DAILY
Status: DISCONTINUED | OUTPATIENT
Start: 2023-11-25 | End: 2023-11-29 | Stop reason: HOSPADM

## 2023-11-24 RX ORDER — LEVOTHYROXINE SODIUM 75 UG/1
75 TABLET ORAL
Status: DISCONTINUED | OUTPATIENT
Start: 2023-11-25 | End: 2023-11-29 | Stop reason: HOSPADM

## 2023-11-24 RX ORDER — LOSARTAN POTASSIUM AND HYDROCHLOROTHIAZIDE 25; 100 MG/1; MG/1
1 TABLET ORAL DAILY
Status: ON HOLD | COMMUNITY
End: 2023-11-29 | Stop reason: SDUPTHER

## 2023-11-24 RX ORDER — DIAZEPAM 5 MG/1
10 TABLET ORAL EVERY 6 HOURS
Status: DISCONTINUED | OUTPATIENT
Start: 2023-11-24 | End: 2023-11-25

## 2023-11-24 RX ORDER — ESCITALOPRAM OXALATE 10 MG/1
10 TABLET ORAL DAILY
Status: DISCONTINUED | OUTPATIENT
Start: 2023-11-25 | End: 2023-11-29 | Stop reason: HOSPADM

## 2023-11-24 RX ORDER — LEVOTHYROXINE SODIUM 75 UG/1
75 TABLET ORAL
Status: ON HOLD | COMMUNITY
End: 2023-11-29 | Stop reason: HOSPADM

## 2023-11-24 RX ORDER — NALTREXONE HYDROCHLORIDE 50 MG/1
50 TABLET, FILM COATED ORAL DAILY
Status: ON HOLD | COMMUNITY
End: 2023-11-29 | Stop reason: HOSPADM

## 2023-11-24 RX ORDER — THIAMINE HCL 100 MG
100 TABLET ORAL DAILY
Status: DISCONTINUED | OUTPATIENT
Start: 2023-11-25 | End: 2023-11-29 | Stop reason: HOSPADM

## 2023-11-24 RX ORDER — GABAPENTIN 600 MG/1
600 TABLET ORAL 3 TIMES DAILY PRN
Status: ON HOLD | COMMUNITY
End: 2023-11-29 | Stop reason: HOSPADM

## 2023-11-24 RX ORDER — SODIUM,POTASSIUM PHOSPHATES 280-250MG
2 POWDER IN PACKET (EA) ORAL
Status: COMPLETED | OUTPATIENT
Start: 2023-11-24 | End: 2023-11-24

## 2023-11-24 RX ORDER — METHOCARBAMOL 750 MG/1
750 TABLET, FILM COATED ORAL 3 TIMES DAILY PRN
Status: ON HOLD | COMMUNITY
End: 2023-11-29 | Stop reason: HOSPADM

## 2023-11-24 RX ORDER — OMEPRAZOLE 20 MG/1
20 CAPSULE, DELAYED RELEASE ORAL DAILY
Status: ON HOLD | COMMUNITY
End: 2023-11-29 | Stop reason: HOSPADM

## 2023-11-24 RX ORDER — DEXTROSE MONOHYDRATE AND SODIUM CHLORIDE 5; .9 G/100ML; G/100ML
INJECTION, SOLUTION INTRAVENOUS CONTINUOUS
Status: DISCONTINUED | OUTPATIENT
Start: 2023-11-24 | End: 2023-11-25

## 2023-11-24 RX ORDER — HYDROCHLOROTHIAZIDE 25 MG/1
25 TABLET ORAL DAILY
Status: DISCONTINUED | OUTPATIENT
Start: 2023-11-25 | End: 2023-11-29 | Stop reason: HOSPADM

## 2023-11-24 RX ORDER — GABAPENTIN 300 MG/1
600 CAPSULE ORAL 3 TIMES DAILY
Status: DISCONTINUED | OUTPATIENT
Start: 2023-11-24 | End: 2023-11-29 | Stop reason: HOSPADM

## 2023-11-24 RX ORDER — BACLOFEN 10 MG/1
10 TABLET ORAL EVERY 8 HOURS PRN
Status: ON HOLD | COMMUNITY
End: 2023-11-29 | Stop reason: HOSPADM

## 2023-11-24 RX ORDER — MAGNESIUM SULFATE HEPTAHYDRATE 40 MG/ML
2 INJECTION, SOLUTION INTRAVENOUS ONCE
Status: COMPLETED | OUTPATIENT
Start: 2023-11-24 | End: 2023-11-24

## 2023-11-24 RX ORDER — POTASSIUM CHLORIDE 20 MEQ/1
40 TABLET, EXTENDED RELEASE ORAL ONCE
Status: DISCONTINUED | OUTPATIENT
Start: 2023-11-24 | End: 2023-11-24

## 2023-11-24 RX ADMIN — Medication 6 MG: at 01:11

## 2023-11-24 RX ADMIN — ATORVASTATIN CALCIUM 10 MG: 10 TABLET, FILM COATED ORAL at 08:11

## 2023-11-24 RX ADMIN — ANASTROZOLE 1 MG: 1 TABLET, COATED ORAL at 08:11

## 2023-11-24 RX ADMIN — GABAPENTIN 600 MG: 300 CAPSULE ORAL at 03:11

## 2023-11-24 RX ADMIN — ONDANSETRON 4 MG: 4 TABLET ORAL at 08:11

## 2023-11-24 RX ADMIN — TRAZODONE HYDROCHLORIDE 150 MG: 50 TABLET ORAL at 09:11

## 2023-11-24 RX ADMIN — CLONIDINE HYDROCHLORIDE 0.1 MG: 0.1 TABLET ORAL at 08:11

## 2023-11-24 RX ADMIN — ACETAMINOPHEN 650 MG: 325 TABLET ORAL at 09:11

## 2023-11-24 RX ADMIN — POTASSIUM & SODIUM PHOSPHATES POWDER PACK 280-160-250 MG 2 PACKET: 280-160-250 PACK at 09:11

## 2023-11-24 RX ADMIN — ACETAMINOPHEN 650 MG: 325 TABLET ORAL at 03:11

## 2023-11-24 RX ADMIN — ENOXAPARIN SODIUM 40 MG: 40 INJECTION SUBCUTANEOUS at 05:11

## 2023-11-24 RX ADMIN — Medication 6 MG: at 09:11

## 2023-11-24 RX ADMIN — METOPROLOL SUCCINATE 50 MG: 50 TABLET, EXTENDED RELEASE ORAL at 08:11

## 2023-11-24 RX ADMIN — DIAZEPAM 10 MG: 5 TABLET ORAL at 12:11

## 2023-11-24 RX ADMIN — THIAMINE HYDROCHLORIDE 500 MG: 100 INJECTION, SOLUTION INTRAMUSCULAR; INTRAVENOUS at 11:11

## 2023-11-24 RX ADMIN — MAGNESIUM SULFATE HEPTAHYDRATE 2 G: 40 INJECTION, SOLUTION INTRAVENOUS at 09:11

## 2023-11-24 RX ADMIN — FLUTICASONE FUROATE AND VILANTEROL TRIFENATATE 1 PUFF: 100; 25 POWDER RESPIRATORY (INHALATION) at 08:11

## 2023-11-24 RX ADMIN — POTASSIUM & SODIUM PHOSPHATES POWDER PACK 280-160-250 MG 2 PACKET: 280-160-250 PACK at 08:11

## 2023-11-24 RX ADMIN — PANTOPRAZOLE SODIUM 40 MG: 40 TABLET, DELAYED RELEASE ORAL at 08:11

## 2023-11-24 RX ADMIN — DIAZEPAM 10 MG: 5 TABLET ORAL at 05:11

## 2023-11-24 RX ADMIN — NICOTINE 1 PATCH: 14 PATCH, EXTENDED RELEASE TRANSDERMAL at 08:11

## 2023-11-24 RX ADMIN — DEXTROSE AND SODIUM CHLORIDE: 5; 900 INJECTION, SOLUTION INTRAVENOUS at 12:11

## 2023-11-24 RX ADMIN — DIAZEPAM 10 MG: 5 TABLET ORAL at 11:11

## 2023-11-24 RX ADMIN — GABAPENTIN 600 MG: 300 CAPSULE ORAL at 09:11

## 2023-11-24 RX ADMIN — FOLIC ACID 1 MG: 1 TABLET ORAL at 08:11

## 2023-11-24 NOTE — ASSESSMENT & PLAN NOTE
Presented due to cutting bilateral wrists.  Wounds are superficial and she is hemodynamically stable.  States she has been missing her son who is  and has been drinking in excess.  EtOH level 279 on admit.  Reports that she follows with an outside Psychiatrist and has seen Dr. Cortes in the past. Takes Abilify as an outpatient.  PEC instituted in the ED.    - Psychiatry consulted, appreciate recs  - f/u [facility]

## 2023-11-24 NOTE — ASSESSMENT & PLAN NOTE
Reports drinking vodka with last drink day of admit.  EtOH 279.  High risk for withdrawal seizures as she has experienced these in the past.  Numerous prior admissions for similar episodes most recently 11/4 - 11/6 requiring a Valium taper.  Has been to residential rehab several times, attended AA meetings, completed IOP in the past.     - Valium 10mg q8h  - Ativan 2mg q4h PRN for CIWA > 8  - CIWA checks  - Thiamine and folate supplementation  - Fall precautions  - Seizure precautions  - [place] on cessation

## 2023-11-24 NOTE — CONSULTS
"CONSULTATION LIAISON PSYCHIATRY INITIAL EVALUATION    Patient Name: Earl Abdul  MRN: [id number]  Patient Class: IP- Inpatient  Admission Date: 11/23/2023  Attending Physician: Kirk Goetz MD      HPI:   64yo F w/ hx of Depression w/ SA in 2017, Alcohol Use Disorder w/ previous withdrawal seizures, Tobacco Use Disorder & a PMHx of Sarcoidosis, COPD, CLL (not on tx), T2DM, HTN, HLD, Fibromyalgia admitted for alcoholic ketoacidosis. Consulted for being placed under PEC for +SI w/ SIB (superficial wrist cuts) and relapse of alcohol use. 11/23/23 Serum Alcohol 279 (H) w/o significant liver enzyme derangements; UDS+ Benzo only;  review w/o discrepancies/concerns. Last drink was during the day on 11/23 s/p benzo taper w/ CIWA protocol initiated by Primary. Of note, pt is currently enrolled in ABU IOP here, however pt has been non-adherent to follow-ups over the last 2 wks.     Psychiatry consulted for "PEC for SA; alcohol use disorder"    Pt seen in room on interview. A&Ox3. Pt stated feeling worse w/ depression for the past month, given nearing the anniversary of the passing of her son around the end of this month. Pt stated that she made a mistake drinking too much over the past wk, averaging about 1/2 bottle of Vodka daily. Denied any other alcohol consumption or concurrent class 2 drug misuse. Pt appears to be minimizing symptoms of depression stating that SA was "not true" and that she called EMS that she drank too much; denied expressing hopelessness to EMS per other notes. Denied SI/HI/AVH. Pt appears tremulous throughout interview focused on discharge expressing frustrations about not having her cell phone.    Collateral with patient's permission:   Henrique Abdul (Spouse)  [phone] (Home Phone)       Medical Review of Systems:  Pertinent items are noted in HPI.    Psychiatric Review of Systems (is patient experiencing or having changes in):  sleep: yes, difficult to stay asleep  appetite: yes, " "eating less  weight: no  energy/anergy: no  interest/pleasure/anhedonia: no, expressing future-oriented thinking of getting presents for granddaughter (late-son's daughter).  somatic symptoms: no  libido: expressing frustration about sex-less marriage.  anxiety/panic: no  guilty/hopelessness: no  concentration: yes  Ursula:no  Psychosis: no  Trauma: no  S.I.B.s/risky behavior: yes, cutting, denied previous attempt, w/ motivation to "feel something" expressing disassociation    Past Psychiatric History:  Previous Medication Trials: Abilify, Doxepin, Clonidine, Cymbalta, Prozac, Remeron, Trazodone, Buspar, Pristiq  Previous Psychiatric Hospitalizations: Yes most recently at [facility] in [place] in 9/18 due to concern for SA    Previous Suicide Attempts: yes  History of Violence: no  Outpatient Psychiatrist: no  Family Psychiatric History: unknown    Substance Abuse History (with emphasis over the last 12 months):  Recreational Drugs: marijuana  Use of Alcohol: heavy  Tobacco Use:yes  Rehab History:yes, currently enrolled in [facility] IOP program    Social History:  Marital Status:   Children: yes  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  Education:  unknown  Special Ed: unknown  Housing Status: with spouse  Access to gun: no  Psychosocial Stressors: death of son and mother, medical issues  Functioning Relationships: good support system    Legal History:  Past Charges/Incarcerations: no  Pending charges:no    Mental Status Exam:  General Appearance: dressed in hospital garb, in no acute distress, appears older than stated age  Behavior: normal; cooperative; reasonably friendly, pleasant, and polite; appropriate eye-contact; under good behavioral control  Involuntary Movements and Motor Activity: +tremors, no tics, no akathisia, no dystonia, no parkinsonism, no bradykineia  Gait and Station: intact, normal gait and station, ambulates without assistance  Speech and Language: intact; normal rate, " "rhythm, volume, tone, and pitch; conversational, spontaneous, and coherent; speaks and understands English proficiently and fluently; repeats words and phrases, no word finding difficulties are noted  Mood: "okay"  Affect: reactive, constricted  Thought Process and Associations: intact; linear, goal-directed, organized, and logical; no loosening of associations noted  Thought Content and Perceptions::  Denied SI/HI/AVH  Sensorium and Orientation: grossly intact  Recent and Remote Memory: grossly intact  Attention and Concentration: grossly intact  Fund of Knowledge: grossly intact  Insight: limited/partial awareness of illness, minimizing psychiatric presentation  Judgment: intact    CAM ICU positive? no    ASSESSMENT & RECOMMENDATIONS   Alcohol Use Disorder, Severe, w/ hx of complicated withdrawal seizures  Unspecified Depressive Disorder c/b SA v SI w/ SIB via cutting  R/O SIMD, Prolonged Grief Disorder, Adjustment disorder w/ depressed mood    Resume home meds Lexapro 10mg daily, Trazadone 150mg nightly  11/23/23 EKG tachy 108bpm w/ QTc 436ms  Alcohol withdrawal management:  Last drink 11/23/23; protocol below for 5-7 days after aforementioned date:  CIWA w/ VS check q4hrs while awake  Scheduled Valium 10mg q6hrs  PRN Ativan 2mg q6hrs for CIWA>8 or 2 of 3: SBP>160, DBP>100, HR>110. Please correlate with pt's concurrent underlying medical hx and clinical setting that may mimic elevated VS.  Daily Thiamine, Folate, and Multivitamins  Recommend higher levels of substance abuse beyond IOP; e.g. inpatient/residential rehab. Resources provided in discharge instructions.    DELIRIUM  DELIRIUM BEHAVIOR MANAGEMENT  PLEASE utilize CHEMICAL restraints with PRN meds first for agitation. Minimize use of PHYSICAL restraints OR have periods of being out of physical restraints if possible.  Keep window shades open and room lit during day and room dim at night in order to promote normal sleep-wake cycles  Encourage family at " bedside. [place] patient often to situation, location, date.  Continue to Limit or Discontinue use of Narcotics, Benzos and Anti-cholinergic medications as they may worsen delirium.  Continue medical workup for causative etiology of Delirium.     OTHER PERTINENT DIAGNOSIS    RISK ASSESSMENT  Continue CEC(PEC signed 11/23/23 @2102hrs) because patient is in imminent danger of hurting self or others and is gravely disabled. & NEEDS 1:1 sitter  NO NEED FOR PEC patient NOT in any imminent danger of hurting self or others and not gravely disabled.     FOLLOW UP  Will follow up while in house    DISPOSITION - once medically cleared:   Seek involuntary inpatient psychiatric admission for stabilization of acute psychiatric symptoms and a safe disposition plan is enacted. The patient &/or their family was informed that the patient will be transferred to an inpatient unit per ED/primary placement team.     Please contact ON CALL psychiatry service (24/7) for any acute issues that may arise.    Dr. Mervin Osorio   Psychiatry  [facility]  11/24/2023 5:30 PM        --------------------------------------------------------------------------------------------------------------------------------------------------------------------------------------------------------------------------------------    CONTINUED HISTORY & OBJECTIVE clinical data & findings reviewed and noted for above decision making    Past Medical/Surgical History:   Past Medical History:   Diagnosis Date    Alcoholism     c/b alcohol withdrawl seizures 7/2017    Anemia     Cancer of breast 10/2020    s/p bilateral mastectomy for  T1b N0 stage IA breast cancer October 2020    CLL (chronic lymphocytic leukemia) 09/30/2022 6/22/22 - PB flow cytometry  Immunophenotyping of peripheral blood detects a distinct kappa light chain restricted monoclonal B-cell population  (calculated at 2.44x10 9 /L, from the most recent CBC showing a total WBC of   7.35 K/uL with 61% total lymphocytes)  with a CLL phenotype (coexpression of CD19, CD5, CD23 and dim CD20). CD22 (FITC), FMC-7 and CD38 are negative in this population.    Controlled type 2 diabetes mellitus without complication, without long-term current use of insulin 11/30/2021    COPD (chronic obstructive pulmonary disease)     Depression     Diverticulitis     Fatty liver     GERD (gastroesophageal reflux disease)     Hyperlipidemia     Hypertension     Pancreatitis     Peptic ulcer disease     Polysubstance abuse     Posterior reversible encephalopathy syndrome     Sarcoidosis of lung     over 30 yrs ago    Suicide attempt      Past Surgical History:   Procedure Laterality Date    APPENDECTOMY      BILATERAL MASTECTOMY Bilateral 10/29/2020    Procedure: MASTECTOMY, BILATERAL;  Surgeon: Baylee Kevin MD;  Location: [facility] OR [address] [place] [address];  Service: General;  Laterality: Bilateral;    BREAST REVISION SURGERY Bilateral 2/11/2021    Procedure: BREAST REVISION SURGERY;  Surgeon: Scottie Johnson MD;  Location: [facility] OR [address] [place] [address];  Service: Plastics;  Laterality: Bilateral;    COLONOSCOPY N/A 7/28/2017    Procedure: COLONOSCOPY;  Surgeon: Aaron Alvarado MD;  Location: [facility];  Service: Endoscopy;  Laterality: N/A;    ESOPHAGOGASTRODUODENOSCOPY  10/7/2016, 11/6/2014    2016 - gastritis, duodenitis, 2014 erosive gastritis    ESOPHAGOGASTRODUODENOSCOPY N/A 2/11/2020    Procedure: ESOPHAGOGASTRODUODENOSCOPY (EGD);  Surgeon: Fawn Garrido MD;  Location: [facility];  Service: Endoscopy;  Laterality: N/A;    ESOPHAGOGASTRODUODENOSCOPY N/A 4/19/2021    Procedure: EGD (ESOPHAGOGASTRODUODENOSCOPY);  Surgeon: Paramjit Martino MD;  Location: [facility];  Service: Endoscopy;  Laterality: N/A;    FLEXIBLE SIGMOIDOSCOPY  11/06/2014    colitis    HYSTERECTOMY      IMPLANTATION OF PERMANENT SACRAL NERVE STIMULATOR N/A 7/12/2022    Procedure: INSERTION, NEUROSTIMULATOR, PERMANENT, SACRAL;  Surgeon: Juaquin Edwards MD;   [address];  Service: Urology;  Laterality: N/A;  1hr    INJECTION FOR SENTINEL NODE IDENTIFICATION Right 10/29/2020    Procedure: INJECTION, FOR SENTINEL NODE IDENTIFICATION;  Surgeon: Baylee Kevin MD;  Location: [address];  Service: General;  Laterality: Right;    INJECTION OF JOINT Right 10/10/2019    Procedure: Injection, Joint RIGHT ILIOPSOAS BURSA/TENDON INJECTION AND RIGHT GLUTEAL TENDON INJECTION WITH STEROID AND LIDOCAINE;  Surgeon: Guillaume Rico MD;  Location: [facility];  Service: Pain Management;  Laterality: Right;  NEEDS CONSENT    INSERTION OF BREAST TISSUE EXPANDER Bilateral 10/29/2020    Procedure: INSERTION, TISSUE EXPANDER, BREAST;  Surgeon: Scottie Johnson MD;  Location: [address];  Service: Plastics;  Laterality: Bilateral;  Right breast: 1082 g  Left breast: 1076 g    LIPOSUCTION Bilateral 2/11/2021    Procedure: LIPOSUCTION;  Surgeon: Scottie Johnson MD;  Location: [address];  Service: Plastics;  Laterality: Bilateral;    mediastenoscopy      REPLACEMENT OF IMPLANT OF BREAST Bilateral 2/11/2021    Procedure: REPLACEMENT, IMPLANT, BREAST;  Surgeon: Scottie Johnson MD;  Location: [address];  Service: Plastics;  Laterality: Bilateral;    SENTINEL LYMPH NODE BIOPSY Right 10/29/2020    Procedure: BIOPSY, LYMPH NODE, SENTINEL;  Surgeon: Baylee Kevin MD;  Location: [address];  Service: General;  Laterality: Right;    TONSILLECTOMY N/A 1970    TUBAL LIGATION         Current Medications:   Scheduled Meds:    diazePAM  10 mg Oral Q6H    enoxparin  40 mg Subcutaneous Daily    [START ON 11/25/2023] EScitalopram oxalate  10 mg Oral Daily    fluticasone furoate-vilanteroL  1 puff Inhalation Daily    folic acid  1 mg Oral Daily    gabapentin  600 mg Oral TID    [START ON 11/25/2023] hydroCHLOROthiazide  25 mg Oral Daily    [START ON 11/25/2023] levothyroxine  75 mcg Oral Before breakfast    [START ON 11/25/2023] losartan  100 mg Oral  Daily    [START ON 11/25/2023] multivitamin  1 tablet Oral Daily    nicotine  1 patch Transdermal Daily    pantoprazole  40 mg Oral Daily    [START ON 11/25/2023] thiamine  100 mg Oral Daily    traZODone  150 mg Oral QHS     PRN Meds: acetaminophen, dextrose 10%, dextrose 10%, glucagon (human recombinant), glucose, glucose, insulin aspart U-100, lorazepam, melatonin, naloxone, ondansetron, sodium chloride 0.9%    Allergies:   Review of patient's allergies indicates:   Allergen Reactions    Lortab [hydrocodone-acetaminophen] Itching    Promethazine Itching and Other (See Comments)       Vitals  Vitals:    11/24/23 2001   BP: (!) 149/68   Pulse: 75   Resp: 18   Temp: 99 °F (37.2 °C)       Labs/Imaging/Studies:  Recent Results (from the past 24 hour(s))   Comprehensive Metabolic Panel (CMP)    Collection Time: 11/24/23  4:00 AM   Result Value Ref Range    Sodium 141 136 - 145 mmol/L    Potassium 3.7 3.5 - 5.1 mmol/L    Chloride 100 95 - 110 mmol/L    CO2 22 (L) 23 - 29 mmol/L    Glucose 54 (L) 70 - 110 mg/dL    BUN 10 8 - 23 mg/dL    Creatinine 0.7 0.5 - 1.4 mg/dL    Calcium 8.3 (L) 8.7 - 10.5 mg/dL    Total Protein 6.5 6.0 - 8.4 g/dL    Albumin 3.8 3.5 - 5.2 g/dL    Total Bilirubin 0.5 0.1 - 1.0 mg/dL    Alkaline Phosphatase 63 55 - 135 U/L    AST 61 (H) 10 - 40 U/L    ALT 36 10 - 44 U/L    eGFR >60.0 >60 mL/min/1.73 m^2    Anion Gap 19 (H) 8 - 16 mmol/L   Magnesium    Collection Time: 11/24/23  4:00 AM   Result Value Ref Range    Magnesium 1.5 (L) 1.6 - 2.6 mg/dL   Phosphorus    Collection Time: 11/24/23  4:00 AM   Result Value Ref Range    Phosphorus 2.1 (L) 2.7 - 4.5 mg/dL   CBC with Automated Differential    Collection Time: 11/24/23  4:00 AM   Result Value Ref Range    WBC 16.29 (H) 3.90 - 12.70 K/uL    RBC 3.72 (L) 4.00 - 5.40 M/uL    Hemoglobin 9.8 (L) 12.0 - 16.0 g/dL    Hematocrit 32.4 (L) 37.0 - 48.5 %    MCV 87 82 - 98 fL    MCH 26.3 (L) 27.0 - 31.0 pg    MCHC 30.2 (L) 32.0 - 36.0 g/dL    RDW 18.6 (H) 11.5  - 14.5 %    Platelets 88 (L) 150 - 450 K/uL    MPV 9.5 9.2 - 12.9 fL    Immature Granulocytes 0.4 0.0 - 0.5 %    Gran # (ANC) 3.0 1.8 - 7.7 K/uL    Immature Grans (Abs) 0.06 (H) 0.00 - 0.04 K/uL    Lymph # 12.5 (H) 1.0 - 4.8 K/uL    Mono # 0.6 0.3 - 1.0 K/uL    Eos # 0.1 0.0 - 0.5 K/uL    Baso # 0.04 0.00 - 0.20 K/uL    nRBC 0 0 /100 WBC    Gran % 18.3 (L) 38.0 - 73.0 %    Lymph % 76.9 (H) 18.0 - 48.0 %    Mono % 3.7 (L) 4.0 - 15.0 %    Eosinophil % 0.5 0.0 - 8.0 %    Basophil % 0.2 0.0 - 1.9 %    Differential Method Automated    POCT glucose    Collection Time: 11/24/23  8:01 AM   Result Value Ref Range    POCT Glucose 60 (L) 70 - 110 mg/dL   POCT glucose    Collection Time: 11/24/23  8:43 AM   Result Value Ref Range    POCT Glucose 91 70 - 110 mg/dL   POCT glucose    Collection Time: 11/24/23 11:06 AM   Result Value Ref Range    POCT Glucose 88 70 - 110 mg/dL   Urinalysis, Reflex to Urine Culture Urine, Catheterized    Collection Time: 11/24/23  2:12 PM    Specimen: Urine   Result Value Ref Range    Specimen UA Urine, Catheterized     Color, UA Yellow Yellow, Straw, Tara    Appearance, UA Clear Clear    pH, UA 6.0 5.0 - 8.0    Specific Gravity, UA 1.030 1.005 - 1.030    Protein, UA 1+ (A) Negative    Glucose, UA Negative Negative    Ketones, UA 3+ (A) Negative    Bilirubin (UA) Negative Negative    Occult Blood UA Negative Negative    Nitrite, UA Negative Negative    Leukocytes, UA Negative Negative   Urinalysis Microscopic    Collection Time: 11/24/23  2:12 PM   Result Value Ref Range    RBC, UA 1 0 - 4 /hpf    WBC, UA 4 0 - 5 /hpf    Bacteria Rare None-Occ /hpf    Hyaline Casts, UA 0 0-1/lpf /lpf    Microscopic Comment SEE COMMENT    Drug screen panel, emergency    Collection Time: 11/24/23  2:18 PM   Result Value Ref Range    Benzodiazepines Presumptive Positive (A) Negative    Methadone metabolites Negative Negative    Cocaine (Metab.) Negative Negative    Opiate Scrn, Ur Negative Negative    Barbiturate  Screen, Ur Negative Negative    Amphetamine Screen, Ur Negative Negative    THC Negative Negative    Phencyclidine Negative Negative    Creatinine, Urine 175.0 15.0 - 325.0 mg/dL    Toxicology Information SEE COMMENT    COVID-19 Routine Screening    Collection Time: 11/24/23  2:32 PM   Result Value Ref Range    SARS-CoV2 (COVID-19) Qualitative PCR Not Detected Not Detected   POCT glucose    Collection Time: 11/24/23  4:13 PM   Result Value Ref Range    POCT Glucose 174 (H) 70 - 110 mg/dL   POCT glucose    Collection Time: 11/24/23  7:59 PM   Result Value Ref Range    POCT Glucose 109 70 - 110 mg/dL     Imaging Results              X-Ray Chest AP Portable (Final result)  Result time 11/23/23 20:02:18      Final result by Uche Montano MD (11/23/23 20:02:18)                   Impression:      No acute intrathoracic process.      Electronically signed by: Uche Montano MD  Date:    11/23/2023  Time:    20:02               Narrative:    EXAMINATION:  XR CHEST AP PORTABLE    CLINICAL HISTORY:  Hypoxemia    TECHNIQUE:  Single frontal view of the chest was performed.    COMPARISON:  11/14/2023.    FINDINGS:  There are postoperative changes in the right axillary region and the right chest wall.  Monitoring EKG leads are present.    The trachea is unremarkable.  There are calcifications of the aortic knob.  The cardiomediastinal silhouette is within normal limits.  There is no evidence of free air beneath the hemidiaphragms.  There are no pleural effusions.  There is no evidence of a pneumothorax.  There is no evidence of pneumomediastinum.  No airspace opacity is present.  There are degenerative changes in the osseous structures.

## 2023-11-24 NOTE — NURSING
Pt arrived to room 651 with staff and security, belongings are removed from room and kept secure. VSS. Denies pain, HA, tremors and diaphoresis as well any suicidal ideation at this time. Removed all possible equipments from room and a sitter is at bedside. Will continue to monitor.

## 2023-11-24 NOTE — NURSING
Notified Dr. Canela via secure chat that patient's peripheral IV stopped working and we were unsuccessful trying to start another one. Midline team consulted to try to get new peripheral for continuous IV fluids.

## 2023-11-24 NOTE — ED NOTES
Pt remains in paper scrubs, resting in stretcher comfortably. No signs of distress noted. Sitter remains at bedside in direct visual contact, charting per protocol every 15 minutes. No belongings are in the patients room. Medically necessary equipment remains in room. Pt aware of plan of care. Will continue to monitor.

## 2023-11-24 NOTE — ED NOTES
Taking sips of water. Informed that will be encouraging fluids to stimulate urine for specimen ordered. DVC is maintained.

## 2023-11-24 NOTE — ASSESSMENT & PLAN NOTE
Does not appear to be in acute respiratory distress but is requiring 2L.  CXR unremarkable.    - Continue home inhaler  - Supplemental O2 goal sats 88-92%, wean as tolerated  - [place] on smoking cessation

## 2023-11-24 NOTE — HOSPITAL COURSE
PEC on 11/23/23.  Patient tremulous during withdrawal period, started on Valium q6h and CIWA precautions. Denies hallucinations or seizures, however patient's  states she had seizures 6 or 7 years ago. Patient's  also states that patient has not been compliant with home valium tapers in the past. Tapering valium while inpatient. Patient's  Henrique is concerned that if she is not under CEC, she will leave AMA and will have recurring SI and SA while intoxicated. Psychiatry is following, greatly appreciate recommendations.  CEC expires 12/8/23. Plan to DC after Valium taper completed with CEC lifted to facilitate patient's travel to [facility] for voluntary substance use rehab.

## 2023-11-24 NOTE — PLAN OF CARE
Pt is AAOx4. Pt remain free from fall and injuries. VS remain stable. Questions and concerns voiced and answered. Medication compliance. Bladder scan amount 501 ml. Straight cath 400 ml. Call light in reach. Bed in low locked position. Side rails x2. Belongings at bedside.

## 2023-11-24 NOTE — DISCHARGE INSTRUCTIONS
REFERRAL RECOMMENDATIONS FOR ALCOHOL USE DISORDER      12 STEP PROGRAMS (and similar):     Alcoholics Anonymous (local)  [x] [phone]  [x] www.[place].org for schedules for in-person and online meetings  [x] There are AA meetings throughout the day all over town  [x] AA costs nothing to attend; they pass a basket for donations but this is not required    Alcoholics Anonymous Online Intergroup (national)  [x] www.aa-intergroup.org  [x] Good resource for large, nation-wide meetings  [x] Can also attend smaller, local meetings in other cities  [x] Countless meetings all day and all night  [x] AA costs nothing to attend; they pass a basket for donations but this is not required    Flying Sober - 24/7 zoom meetings for women and coed - sign on anytime, anywhere!  [email or web address] PicturessobMiTu [email or web address]-rwycjjnv/    Online Intergroup of AA - 121 Open AA Pine Meeting - 24/7 zoom meetings  https://aa-intergroup.org/meetings/    LOOKING FOR AN ALTERNATIVE TO 12 STEP PROGRAMS - check out:  SMART Recovery: https://www.smartrecovery.org/about-us  [facility] Recovery: https://recoverydharma.org      DETOX UNITS (USUALLY 5-7 DAYS):     [facility]: [address], [place]  [phone], call first to ensure bed availability    [facility]: [address] [place]  [phone], Option 1, call first to ensure bed availability    [place] [facility]: [facility] [address], [place]  [phone] (intake by appointment only)    [facility]: [address], [place]  [phone]      INTENSIVE OUTPATIENT PROGRAMS:     [facility] (formerly known as the ABU)  [x] [phone], Option 2  [x] [address] 4th Floor, [place] [address]  [x] [email or web address]://www.[email or web address]/services/[email or web address]-recovery-program  [x] [facility] delivers comprehensive and collaborative treatment for alcohol and substance use disorders.  Excellent program for working professionals or  anyone else seeking recovery.  [x] Requires insurance approval prior to starting program, call number above for more information.  [x] Intensive Outpatient Rehabilitation Program - M-F 9am-3pm - daily groups with psychologists and social workers, sessions with MDs 3x per week   [x] Ambulatory detox and dual diagnosis available    [facility] Intensive Outpatient Program  [x] [phone]  [x] [address] [facility] (the clinic not [facility])  [x] Call number above for more info and to check insurance requirements    [facility]  [address] [place], [place] [address] [phone]    [facility]:  [address] [facility], [address]?, [place], [place] [address]?, [phone]  [address] [facility]?, [place], [place] [address]?, [phone]    RESIDENTIAL REHABS (USUALLY 28 DAYS):     [facility]: [address], [phone]    [facility]: [address] , [facility]  [phone] (intake by appointment only)    [facility] (men only) [address] [facility], [phone]    [facility] (Female only) [address] [facility], [phone]    [facility]: [address], [facility], men's program [phone], women's program [phone]    [facility]: [address], [facility], [phone]    [facility]: [address] [place], [place], [phone]    [facility]: Men only, [phone], [address] [place] [facility]: [address], [place], [place], [phone]    [facility]: [facility] [place], [place],  [phone]  New Location: [address] [place] [address] 100, [place] [address], [phone]    [facility]:   ?[address]?[place], [place] [address]?[phone]    [place]: [address], [place] [address], [phone]    [place]: [place], [phone]     [facility]: [place], [place], [phone]    [facility]: Lucila  RADHA Oakes, [phone]    [facility]: [place], [place], [phone]    [place]: [place], [place], [phone]    [facility] [place]: [address], [place] [address], [phone]    COMMUNITY ADDICTION CLINICS:     [facility]: [address] [facility], [address] [place], [phone] -or- [address] [place] [address]    [facility] [place]: [address] [facility] [place] [facility], [place]  [address]     [facility]: Clinics [phone]; Crisis [phone]    [facility]: [address] [place] [address]    [facility]: [address] [place] [address]    [facility]: [address] [place] [address],    [facility]: 2nd Floor [address] [facility] [place] [address]    [facility]: [address] Elsa V[facility] [place] [address]    [facility], [address] [place] [address]    [facility] Center: [address] [place] [address], [place] [phone]  (serves youth 16-23 years old)    [facility]: [facility]/[place] [phone]    [facility]: [address] [facility], [place] [phone]    [facility]: [address] [place], [place] [phone]    [facility]: [address] [facility], [address], [phone]     [facility]: [address] [facility], [phone], [phone]    RESOURCES IN OTHER [facility]:     [facility]: [address] [place], [phone], [phone]    [facility]: [address] [facility], [address], [phone]    [facility], [address] [place] [address], [phone]    [facility] Health: [address], [place], [phone]    [facility]  "[facility] Health, [address] [facility], [phone] ([facility])    [facility], [address] [employer] [address] [employer], [phone] ([facility])    [facility], [address], [address], [place], [phone] ([facility], [facility], and [facility])    [facility], [address], [place], [phone] ([facility])    [facility] Hotline [phone], [phone]    [facility], [address] [facility], [facility], [address] [facility], [address], [facility], [address] [facility], [facility] Behavioral [facility], [address] [facility] [address] [facility], [address], [facility] Human Services, [address], [address] [phone]    [facility], [address] [facility] [phone]    [facility], [address] [facility], [phone]    [facility] for Addiction Recovery, [address] [place] [address], [phone]      [place] SPEAKING (en español):     Información de la reunión de Alcohólicos Anónimos  [facility], 10:00 am  Habla español  Esta reunión está abierta y cualquiera puede asistir.    Estonian speaking Alcoholics anonymous meetings:  El "Cameron [place] AA Skype" es un cameron on line de Alcohólicos Anónimos en español. El cameron es mily, gratuito y virtual a través de Skype Audio. El cameron funciona mediante beth llamada grupal de voz, por lo que no se utiliza la videollamada, ni se pueden waqas las imágenes o rostros de los participantes. Hace reji años y medio abrimos el primer Cameron de AA por Sktinoe en [place], amaya actualmente asisten personas desde [place], [place], [place], [place], [place], [place], [place], [place], [place], [place], " [place] y [place], entre otros.    El wilner es muy útil para los alcohólicos que residen en lugares donde no se celebran reuniones de AA, o residen en lugares donde las reuniones de AA son un número limitado de días a la semana, o para aquellos compañeros que se hayan de viaje o que, por cualquier motivo, se hayan convalecientes y no pueden desplazarse. Todos los días nos reuniones a las 21:00 (hora española)    Podéis obtener más información sobre el wilner y george sesiones en la págDivide web [email or web address]      MENTAL HEALTH/ADDICTIVE DISORDERS:     AA ([phone]), NA ([phone])   National Suicide Prevention Lifeline- Call 1-606.351.3318 Available 24 hours [facility] [phone]; Crisis Line 671-3039 - Call for options A-F:  Intensive Outpatient Treatment/ Day programs   [facility], please contact   [facility], please contact [phone] or [phone] to speak with an admissions counselor.  [facility] (Methadone Maintenance)   [address] [place] [address], [phone]  [facility] [place] [address] [phone]  [facility], [address] [place] [address], [phone]  [facility] [phone]  [facility] [place] please contact [phone]  [facility], [facility], 4th floor RADHA Car[place] [address] Phone: [phone]   [facility]  [place] Office: [address] [address], [phone]  [place] Office: [address] [facility], [place] [address], [phone]  [place] Office: [address] [facility] [place] [address] [phone]    Outpatient Substance Abuse Treatment   Behavioral Health Group (Methadone Maintenance)   [facility] [place] [address], [phone]  [address] [place] [address] [phone]  [facility], [address] [place] [address], [phone]  Acer  [place] Office: [address] [place] [address], [phone]  [place] Office: [address] [facility], [address], [place] [address], [phone]  [place] Office: [address] [place] [address] [phone]  [facility], [address] [place] [address] [phone]   [facility], [address] [facility], [address], [phone]  [facility], [address] [place], [place] [phone]    Residential Substance Abuse Treatment   [facility] [address] [facility], [phone]  [facility], [address] [facility], [phone]  [facility] (men only) [address] [place] [address], [phone]  [facility] at the [facility] (women only) [address] [place] [address] [phone]  [facility], [address] [place] [address] [phone]  [facility] (women only), intakes at [address] [facility], [phone]  [facility] (7-day program, $100, [address], [phone], [phone], [phone])  [facility] (Men only, [phone]), [address], [place] (Vets*/Non-Vets)  [facility] (Men only, $400/month program fee) [address] [facility] [address] Hussein, [phone]  [facility] (Women over age 39 only), [address] [facility], [phone]    Out of Area:    [place], [address] [place] [address] 36, [place], [place] [phone])  [facility] (men only), [address] [facility] [place] [address], [phone]  [facility], [address] [place], [place] ([phone])  [address] [place], [place] ([phone])  [facility], [address] [place] [address], [phone]  [facility] (Women only, has to have mental illness, can be homeless or substance abuser), [phone]      [place] RESOURCES:     [place] Mobile [facility] Crisis Response  Team:    Region 12 ([place], [place], [place], and [facility]) ([facility])  [phone]      Outpatient Mental Health & Addiction Clinic Resources for both [facility]:    [facility] Mental Healthcare Resources  Website: www.[facility].org  Main Number: 831-181-4976    [facility] ([facility])  [address] ([address] [facility]) [place] [address]  [phone]    [facility] ([facility])  [address] ([address] [facility] [place]) [place] [address]  [phone]    [facility]  [address] ([address]) [place] [address]  [phone]    [facility]  [address] ([address] [facility]) [place] [address]  [phone]      Addiction Treatment Resources for both [facility]:    [facility] (Detox, Residential, PHP, IOP, and Aftercare Programs)  [address] [place], [place] [address] [phone]    [facility] (Residential, IOP, Transitional Living, and Aftercare Programs)  [address] [facility] [place] [address]  [phone]    [facility] (Inpatient, Residential, Detox, IOP, Outpatient, and Aftercare Programs)  [address] [place] [id number] [phone] or toll free at [phone]      Outpatient Mental Health Psychotherapy Resources for both [facility]:    Michell Freire, DEMIW  [address]  [place] [address]  [phone]  Specialties: Depression, Anxiety, and Life Transitions    Debra Rosas, PhD  [address]  [place] [address]  [phone]  Specialties: Testing and Evaluation, Education and Learning Disabilities, and ADHD    Loly Hines, DEMIW [facility] [address] [place] [address]  [phone]  Specialties:  Obsessive-Compulsive (OCD), Depression, and Relationship Issues    Marybel Escalante LPC [address] [place] [address]  [phone]  Specialties: Trauma & PTSD, Mood Disorders, and Anxiety    Marybel Walker, PhD, [facility] [address] [place] [address], [place] [address] [phone]  Specialties: Family Conflict, Child, and Relationship Issues    Molly Macedo LPC Counseling [place], [place] [address] [phone]  Specialties: Anxiety, Depression, and Anger Management        IN CASE OF SUICIDAL THINKING, call the National Suicide Hotline Number: 988    988 Suicide & Crisis Lifeline: 988 , 6-9672-746-258-TALK (6786)  Provides 24/7, free and confidential support for people in distress, prevention and crisis resources for you or your loved ones, and best practices for professionals.    Call, text or chat.  https://988ReDigiline.org

## 2023-11-24 NOTE — PLAN OF CARE
Problem: Violence Risk or Actual  Goal: Anger and Impulse Control  Outcome: Ongoing, Progressing     Problem: Adult Inpatient Plan of Care  Goal: Plan of Care Review  Outcome: Ongoing, Progressing  Goal: Patient-Specific Goal (Individualized)  Outcome: Ongoing, Progressing  Goal: Absence of Hospital-Acquired Illness or Injury  Outcome: Ongoing, Progressing  Goal: Optimal Comfort and Wellbeing  Outcome: Ongoing, Progressing  Goal: Readiness for Transition of Care  Outcome: Ongoing, Progressing     Problem: Diabetes Comorbidity  Goal: Blood Glucose Level Within Targeted Range  Outcome: Ongoing, Progressing     Problem: Impaired Wound Healing  Goal: Optimal Wound Healing  Outcome: Ongoing, Progressing     Problem: Infection  Goal: Absence of Infection Signs and Symptoms  Outcome: Ongoing, Progressing     Problem: Pain Acute  Goal: Acceptable Pain Control and Functional Ability  Outcome: Ongoing, Progressing     Problem: Fall Injury Risk  Goal: Absence of Fall and Fall-Related Injury  Outcome: Ongoing, Progressing

## 2023-11-24 NOTE — ED PROVIDER NOTES
Encounter Date: 11/23/2023       History     Chief Complaint   Patient presents with    Suicidal     Pt called 911. Pt states she cut her wrists yesterday (superficial). Pt states she doesn't want to live anymore.      63-year-old female with history of alcohol use disorder, diabetes, COPD, sarcoidosis of lung, alcohol withdrawal with seizure, suicide attempt in 2017, hyperlipidemia, hypertension, pancreatitis.    Patient reports that she attempted to cut her wrist 2 days ago and does not want to be around anymore.  She reported this to EMS personnel but denies suicidal ideation to me.  She reports she is been drinking heavily today.  She denies recent alcohol withdrawal.  She reports she can not go more than a day without drinking without experiencing significant alcohol withdrawal.  She denies chest pain or shortness of breath.  She reports that she was supposed to be using home oxygen she is using it intermittently.  She reports he mostly uses it when she is sleeping.      Review of patient's allergies indicates:   Allergen Reactions    Lortab [hydrocodone-acetaminophen] Itching    Promethazine Itching and Other (See Comments)     Past Medical History:   Diagnosis Date    Alcoholism     c/b alcohol withdrawl seizures 7/2017    Anemia     Cancer of breast 10/2020    s/p bilateral mastectomy for  T1b N0 stage IA breast cancer October 2020    CLL (chronic lymphocytic leukemia) 09/30/2022 6/22/22 - PB flow cytometry  Immunophenotyping of peripheral blood detects a distinct kappa light chain restricted monoclonal B-cell population  (calculated at 2.44x10 9 /L, from the most recent CBC showing a total WBC of  7.35 K/uL with 61% total lymphocytes)  with a CLL phenotype (coexpression of CD19, CD5, CD23 and dim CD20). CD22 (FITC), FMC-7 and CD38 are negative in this population.    Controlled type 2 diabetes mellitus without complication, without long-term current use of insulin 11/30/2021    COPD (chronic obstructive  pulmonary disease)     Depression     Diverticulitis     Fatty liver     GERD (gastroesophageal reflux disease)     Hyperlipidemia     Hypertension     Pancreatitis     Peptic ulcer disease     Polysubstance abuse     Posterior reversible encephalopathy syndrome     Sarcoidosis of lung     over 30 yrs ago    Suicide attempt      Past Surgical History:   Procedure Laterality Date    APPENDECTOMY      BILATERAL MASTECTOMY Bilateral 10/29/2020    Procedure: MASTECTOMY, BILATERAL;  Surgeon: Baylee Kevin MD;  Location: [address];  Service: General;  Laterality: Bilateral;    BREAST REVISION SURGERY Bilateral 2/11/2021    Procedure: BREAST REVISION SURGERY;  Surgeon: Scottie Johnson MD;  Location: [address];  Service: Plastics;  Laterality: Bilateral;    COLONOSCOPY N/A 7/28/2017    Procedure: COLONOSCOPY;  Surgeon: Aaron Alvarado MD;  Location: [facility];  Service: Endoscopy;  Laterality: N/A;    ESOPHAGOGASTRODUODENOSCOPY  10/7/2016, 11/6/2014    2016 - gastritis, duodenitis, 2014 erosive gastritis    ESOPHAGOGASTRODUODENOSCOPY N/A 2/11/2020    Procedure: ESOPHAGOGASTRODUODENOSCOPY (EGD);  Surgeon: Fawn Garrido MD;  Location: [facility];  Service: Endoscopy;  Laterality: N/A;    ESOPHAGOGASTRODUODENOSCOPY N/A 4/19/2021    Procedure: EGD (ESOPHAGOGASTRODUODENOSCOPY);  Surgeon: Paramjit Martino MD;  Location: [facility];  Service: Endoscopy;  Laterality: N/A;    FLEXIBLE SIGMOIDOSCOPY  11/06/2014    colitis    HYSTERECTOMY      IMPLANTATION OF PERMANENT SACRAL NERVE STIMULATOR N/A 7/12/2022    Procedure: INSERTION, NEUROSTIMULATOR, PERMANENT, SACRAL;  Surgeon: Juaquin Edwards MD;  Location: [address];  Service: Urology;  Laterality: N/A;  1hr    INJECTION FOR SENTINEL NODE IDENTIFICATION Right 10/29/2020    Procedure: INJECTION, FOR SENTINEL NODE IDENTIFICATION;  Surgeon: Baylee Kevin MD;  Location: [address];  Service: General;  Laterality: Right;    INJECTION OF JOINT Right  10/10/2019    Procedure: Injection, Joint RIGHT ILIOPSOAS BURSA/TENDON INJECTION AND RIGHT GLUTEAL TENDON INJECTION WITH STEROID AND LIDOCAINE;  Surgeon: Guillaume Rico MD;  Location: [facility];  Service: Pain Management;  Laterality: Right;  NEEDS CONSENT    INSERTION OF BREAST TISSUE EXPANDER Bilateral 10/29/2020    Procedure: INSERTION, TISSUE EXPANDER, BREAST;  Surgeon: Scottie Johnson MD;  [facility];  Service: Plastics;  Laterality: Bilateral;  Right breast: 1082 g  Left breast: 1076 g    LIPOSUCTION Bilateral 2021    Procedure: LIPOSUCTION;  Surgeon: Scottie Johnson MD;  [facility];  Service: Plastics;  Laterality: Bilateral;    mediastenoscopy      REPLACEMENT OF IMPLANT OF BREAST Bilateral 2021    Procedure: REPLACEMENT, IMPLANT, BREAST;  Surgeon: Scottie Johnson MD;  [facility];  Service: Plastics;  Laterality: Bilateral;    SENTINEL LYMPH NODE BIOPSY Right 10/29/2020    Procedure: BIOPSY, LYMPH NODE, SENTINEL;  Surgeon: Baylee Kevin MD;  Location: [facility] OR [address] [place] [address];  Service: General;  Laterality: Right;    TONSILLECTOMY N/A 1970    TUBAL LIGATION       Family History   Problem Relation Age of Onset    Heart attack Father     Diabetes Father     Hypertension Father     Diabetes Mother     Hypertension Mother     Breast cancer Maternal Aunt     Colon cancer Maternal Uncle     Breast cancer Daughter     Ovarian cancer Neg Hx     Cancer Neg Hx      Social History     Tobacco Use    Smoking status: Every Day     Current packs/day: 0.00     Average packs/day: 0.5 packs/day for 30.0 years (15.0 ttl pk-yrs)     Types: Vaping with nicotine, Cigarettes     Start date: 1991     Last attempt to quit: 2021     Years since quittin.8    Smokeless tobacco: Never    Tobacco comments:     Patient is currently smoking 10 cigarettes a day, declines nicotine patches   Substance Use Topics    Alcohol use: Yes     Comment: vodka daily  (half a regular bottle) for 4 days    Drug use: Yes     Types: Marijuana     Comment: gummies     Review of Systems  All other systems reviewed and negative except as noted in HPI    Physical Exam     Initial Vitals [11/23/23 1757]   BP Pulse Resp Temp SpO2   129/76 110 18 98.3 °F (36.8 °C) 97 %      MAP       --         Physical Exam    Nursing note and vitals reviewed.  Constitutional:   Intoxicated, strong odor of ethanol   HENT:   Head: Normocephalic.   Eyes: EOM are normal. Pupils are equal, round, and reactive to light.   Neck: Neck supple.   Normal range of motion.  Cardiovascular:  Normal rate, regular rhythm, normal heart sounds and intact distal pulses.           Pulmonary/Chest: No respiratory distress.   Diffuse expiratory wheeze   Abdominal: Abdomen is soft. She exhibits no distension. There is no abdominal tenderness.   Musculoskeletal:         General: No tenderness or edema. Normal range of motion.      Cervical back: Normal range of motion and neck supple.     Neurological: She is alert. She has normal strength. She displays normal reflexes. No cranial nerve deficit or sensory deficit. GCS score is 15. GCS eye subscore is 4. GCS verbal subscore is 5. GCS motor subscore is 6.   Skin: Skin is warm and dry. Capillary refill takes less than 2 seconds.   Subacute lacerations to bilateral wrists on the volar surface with eschar formation.   Psychiatric:   Poor judgment, poor insight         ED Course   Procedures  Labs Reviewed   CBC W/ AUTO DIFFERENTIAL - Abnormal; Notable for the following components:       Result Value    WBC 34.03 (*)     Hemoglobin 11.7 (*)     MCH 26.2 (*)     MCHC 30.5 (*)     RDW 18.8 (*)     Platelets 105 (*)     Gran % 18.0 (*)     Lymph % 81.0 (*)     Mono % 1.0 (*)     Platelet Estimate Decreased (*)     All other components within normal limits   COMPREHENSIVE METABOLIC PANEL - Abnormal; Notable for the following components:    Potassium 5.3 (*)     CO2 18 (*)     Glucose 59  (*)     AST 79 (*)     Anion Gap 24 (*)     All other components within normal limits   ALCOHOL,MEDICAL (ETHANOL) - Abnormal; Notable for the following components:    Alcohol, Serum 279 (*)     All other components within normal limits   ACETAMINOPHEN LEVEL - Abnormal; Notable for the following components:    Acetaminophen (Tylenol), Serum <3.0 (*)     All other components within normal limits   SALICYLATE LEVEL - Abnormal; Notable for the following components:    Salicylate Lvl <5.0 (*)     All other components within normal limits   BETA - HYDROXYBUTYRATE, SERUM - Abnormal; Notable for the following components:    Beta-Hydroxybutyrate 5.2 (*)     All other components within normal limits   ISTAT PROCEDURE - Abnormal; Notable for the following components:    POC PO2 118 (*)     POC HCO3 22.3 (*)     POC BE -3 (*)     POC TCO2 23 (*)     All other components within normal limits   TSH   LACTIC ACID, PLASMA          Imaging Results              X-Ray Chest AP Portable (Final result)  Result time 11/23/23 20:02:18      Final result by Uche Montano MD (11/23/23 20:02:18)                   Impression:      No acute intrathoracic process.      Electronically signed by: Uche Montano MD  Date:    11/23/2023  Time:    20:02               Narrative:    EXAMINATION:  XR CHEST AP PORTABLE    CLINICAL HISTORY:  Hypoxemia    TECHNIQUE:  Single frontal view of the chest was performed.    COMPARISON:  11/14/2023.    FINDINGS:  There are postoperative changes in the right axillary region and the right chest wall.  Monitoring EKG leads are present.    The trachea is unremarkable.  There are calcifications of the aortic knob.  The cardiomediastinal silhouette is within normal limits.  There is no evidence of free air beneath the hemidiaphragms.  There are no pleural effusions.  There is no evidence of a pneumothorax.  There is no evidence of pneumomediastinum.  No airspace opacity is present.  There are degenerative changes in the  osseous structures.                                       Medications   nicotine 14 mg/24 hr 1 patch (1 patch Transdermal Patch Applied 11/24/23 0855)   folic acid tablet 1 mg (1 mg Oral Given 11/24/23 0854)   sodium chloride 0.9% flush 10 mL (has no administration in time range)   melatonin tablet 6 mg (6 mg Oral Given 11/24/23 0159)   LORazepam injection 2 mg (2 mg Intravenous Given 11/23/23 2146)   fluticasone furoate-vilanteroL 100-25 mcg/dose diskus inhaler 1 puff (1 puff Inhalation Given 11/24/23 0821)   pantoprazole EC tablet 40 mg (40 mg Oral Given 11/24/23 0854)   naloxone 0.4 mg/mL injection 0.02 mg (has no administration in time range)   glucose chewable tablet 16 g (has no administration in time range)   glucose chewable tablet 24 g (has no administration in time range)   glucagon (human recombinant) injection 1 mg (has no administration in time range)   enoxaparin injection 40 mg (40 mg Subcutaneous Given 11/24/23 1720)   acetaminophen tablet 650 mg (650 mg Oral Given 11/24/23 1556)   insulin aspart U-100 pen 0-5 Units ( Subcutaneous Not Given 11/24/23 1645)   dextrose 10% bolus 125 mL 125 mL (has no administration in time range)   dextrose 10% bolus 250 mL 250 mL (has no administration in time range)   thiamine tablet 100 mg (has no administration in time range)   dextrose 5 % and 0.9 % NaCl infusion ( Intravenous New Bag 11/24/23 1207)   ondansetron tablet 4 mg (4 mg Oral Given 11/24/23 0854)   diazePAM tablet 10 mg (10 mg Oral Given 11/24/23 1720)   traZODone tablet 150 mg (has no administration in time range)   losartan tablet 100 mg (has no administration in time range)   hydroCHLOROthiazide tablet 25 mg (has no administration in time range)   EScitalopram oxalate tablet 10 mg (has no administration in time range)   levothyroxine tablet 75 mcg (has no administration in time range)   gabapentin capsule 600 mg (600 mg Oral Given 11/24/23 1555)   multivitamin tablet (has no administration in time  range)   albuterol-ipratropium 2.5 mg-0.5 mg/3 mL nebulizer solution 3 mL (3 mLs Nebulization Given 11/23/23 1924)   magnesium sulfate 2g in water 50mL IVPB (premix) (0 g Intravenous Stopped 11/24/23 1106)   potassium, sodium phosphates 280-160-250 mg packet 2 packet (2 packets Oral Given 11/24/23 0943)   thiamine (B-1) 500 mg in dextrose 5 % (D5W) 100 mL IVPB (0 mg Intravenous Stopped 11/24/23 1142)     Medical Decision Making  Patient presents acutely intoxicated with ethanol.  Additionally, she has evidence COPD exacerbation as she was hypoxic on room air and is having some diffuse expiratory wheeze.  Will treat for same.  Patient will likely require admission to manage alcohol detoxification while she is concurrently evaluated by Psychiatry and treated for COPD exacerbation.    Amount and/or Complexity of Data Reviewed  Independent Historian: EMS     Details: Patient reports history of suicidal ideation and attempt to cut her wrist 2 days ago to EMS.  External Data Reviewed: labs, radiology, ECG and notes.     Details: History of CLL   Recent admission to MICU for alcohol withdrawal on 11/04/2023.  Patient dispositioned to intensive outpatient therapy after discharge.  Labs: ordered. Decision-making details documented in ED Course.  Radiology: ordered and independent interpretation performed. Decision-making details documented in ED Course.  ECG/medicine tests: ordered and independent interpretation performed. Decision-making details documented in ED Course.    Risk  OTC drugs.  Prescription drug management.               ED Course as of 11/24/23 1723   Thu Nov 23, 2023 2034 CBC auto differential(!)  Significant leukocytosis but this is in the setting of known CLL.  Mild hyperglycemia and hyperkalemia consistent with decreased p.o. intake in the setting of heavy alcohol use. [DS]   2035 Independent review chest x-ray shows no consolidation or other acute abnormality. [DS]   2035 Get venous blood gas shows  normal pH with metabolic acidosis consistent with patient's heavy alcohol intake. [DS]      ED Course User Index  [DS] Sessions, Jose BLACKBURN MD                        Clinical Impression:  Final diagnoses:  [R09.02] Hypoxia  [E87.29] Alcoholic ketoacidosis (Primary)  [F10.920] Alcoholic intoxication without complication          ED Disposition Condition    Observation Stable                Sessions, Jose BLACKBURN MD  11/23/23 1500       SessionsJose MD  11/24/23 1514

## 2023-11-24 NOTE — H&P
[facility]  Hospital Medicine  History & Physical    Patient Name: Earl Abdul  MRN: [id number]  Patient Class: OP- Observation  Admission Date: 2023  Attending Physician: Kirk Goetz MD   Primary Care Provider: Andrew Rodriguez MD         Patient information was obtained from patient, past medical records, and ER records.     Subjective:     Principal Problem:Suicide attempt by cutting of wrist    Chief Complaint:   Chief Complaint   Patient presents with    Suicidal     Pt called 911. Pt states she cut her wrists yesterday (superficial). Pt states she doesn't want to live anymore.         HPI: Earl Abdul is a 63-year-old female with a medical history of depression, past suicide ideation and attempt, alcohol use disorder, alcohol withdrawl seizures, CLL not on treatment, DM2, COPD, HTN, and HLD who presented to [facility] ED on 23 after self-harm and found to be acutely intoxicated by alcohol.  She tells me that she has been very depressed lately due to missing her son who is .  She made superficial incisions to both wrists.  She also stated that she had been drinking a lot of vodka.  States she is in pain all over her body and is worried about withdrawing from alcohol.  Denies chest pain, palpitations, abdominal pain, nausea, emesis, melena, and calf pain.  Denies audio and visual hallucinations and agitation.  Alert and oriented to person, place, and time during my examination.  Numerous prior admissions for similar episodes most recently  -  requiring a Valium taper.  Has been to residential rehab several times, attended AA meetings, completed IOP in the past.     In the ED, /55  RR 20 and afebrile with sats 93-96% on 2L.  Labs notable for WBC 34 Hgb 11.7 Plt 105 K 5.3 Bicarb 18 Gap 24 Glucose 59 AST 79 BHB 5.2 EtOH 279.  VBG showed pH 7.388 PCO2 37 PO2 118.  CXR showed no acute findings.  EKG showed sinus tachycardia with a rate 108bpm and QTc  436ms.  Received Duonebs, thiamine, folic acid, and nicotine patch.  She was placed under a PEC.    The patient was admitted to Hospital Medicine for further workup and management.    Past Medical History:   Diagnosis Date    Anemia     Anemia     Controlled type 2 diabetes mellitus without complication, without long-term current use of insulin 11/30/2021    COPD (chronic obstructive pulmonary disease)     Depression     Diverticulitis     Fatty liver     GERD (gastroesophageal reflux disease)     Hyperlipidemia     Hypertension     Pancreatitis     Peptic ulcer disease     Polysubstance abuse     Posterior reversible encephalopathy syndrome     Sarcoidosis of lung     Sarcoidosis of lung     over 30 yrs ago    Seizures     7/2017    Suicide attempt     Suicide ideation        Past Surgical History:   Procedure Laterality Date    APPENDECTOMY      BILATERAL MASTECTOMY Bilateral 10/29/2020    Procedure: MASTECTOMY, BILATERAL;  Surgeon: Baylee Kevin MD;  Location: [facility] OR [address] [place] [address];  Service: General;  Laterality: Bilateral;    BREAST REVISION SURGERY Bilateral 2/11/2021    Procedure: BREAST REVISION SURGERY;  Surgeon: Scottie Johnson MD;  Location: [facility] OR [address] [place] [address];  Service: Plastics;  Laterality: Bilateral;    COLONOSCOPY N/A 7/28/2017    Procedure: COLONOSCOPY;  Surgeon: Aaron Alvarado MD;  Location: [facility];  Service: Endoscopy;  Laterality: N/A;    ESOPHAGOGASTRODUODENOSCOPY  10/7/2016, 11/6/2014    2016 - gastritis, duodenitis, 2014 erosive gastritis    ESOPHAGOGASTRODUODENOSCOPY N/A 2/11/2020    Procedure: ESOPHAGOGASTRODUODENOSCOPY (EGD);  Surgeon: Fawn Garrido MD;  Location: [facility];  Service: Endoscopy;  Laterality: N/A;    ESOPHAGOGASTRODUODENOSCOPY N/A 4/19/2021    Procedure: EGD (ESOPHAGOGASTRODUODENOSCOPY);  Surgeon: Paramjit Martino MD;  Location: [facility];  Service: Endoscopy;  Laterality: N/A;    FLEXIBLE SIGMOIDOSCOPY  11/06/2014    colitis    HYSTERECTOMY      IMPLANTATION OF  PERMANENT SACRAL NERVE STIMULATOR N/A 7/12/2022    Procedure: INSERTION, NEUROSTIMULATOR, PERMANENT, SACRAL;  Surgeon: Juaquin Edwards MD;  Location: [facility] OR [address] [place] [address];  Service: Urology;  Laterality: N/A;  1hr    INJECTION FOR SENTINEL NODE IDENTIFICATION Right 10/29/2020    Procedure: INJECTION, FOR SENTINEL NODE IDENTIFICATION;  Surgeon: Baylee Kevin MD;  Location: [address];  Service: General;  Laterality: Right;    INJECTION OF JOINT Right 10/10/2019    Procedure: Injection, Joint RIGHT ILIOPSOAS BURSA/TENDON INJECTION AND RIGHT GLUTEAL TENDON INJECTION WITH STEROID AND LIDOCAINE;  Surgeon: Guillaume Rico MD;  Location: [facility];  Service: Pain Management;  Laterality: Right;  NEEDS CONSENT    INSERTION OF BREAST TISSUE EXPANDER Bilateral 10/29/2020    Procedure: INSERTION, TISSUE EXPANDER, BREAST;  Surgeon: Scottie Johnson MD;  Location: [address];  Service: Plastics;  Laterality: Bilateral;  Right breast: 1082 g  Left breast: 1076 g    LIPOSUCTION Bilateral 2/11/2021    Procedure: LIPOSUCTION;  Surgeon: Scottie Johnson MD;  Location: [address];  Service: Plastics;  Laterality: Bilateral;    mediastenoscopy      REPLACEMENT OF IMPLANT OF BREAST Bilateral 2/11/2021    Procedure: REPLACEMENT, IMPLANT, BREAST;  Surgeon: Scottie Johnson MD;  Location: [address];  Service: Plastics;  Laterality: Bilateral;    SENTINEL LYMPH NODE BIOPSY Right 10/29/2020    Procedure: BIOPSY, LYMPH NODE, SENTINEL;  Surgeon: Baylee Kevin MD;  Location: [address];  Service: General;  Laterality: Right;    TONSILLECTOMY N/A 1970    TUBAL LIGATION         Review of patient's allergies indicates:   Allergen Reactions    Lortab [hydrocodone-acetaminophen] Itching    Promethazine Itching and Other (See Comments)       Current Facility-Administered Medications on File Prior to Encounter   Medication    albuterol sulfate nebulizer solution 2.5 mg     Current Outpatient  Medications on File Prior to Encounter   Medication Sig    acetaminophen (TYLENOL) 500 MG tablet Take 500-1,500 mg by mouth daily as needed for Pain.    albuterol (PROVENTIL/VENTOLIN HFA) 90 mcg/actuation inhaler     anastrozole (ARIMIDEX) 1 mg Tab     ARIPiprazole (ABILIFY) 10 MG Tab Take 1 tablet by mouth once daily.    atorvastatin (LIPITOR) 10 MG tablet Take 1 tablet by mouth once daily.    busPIRone (BUSPAR) 15 MG tablet     butalbital-acetaminophen-caffeine -40 mg (FIORICET, ESGIC) -40 mg per tablet Take 1 tablet by mouth every 4 (four) hours as needed.    cholecalciferol, vitamin D3, (VITAMIN D3) 50 mcg (2,000 unit) Cap capsule TAKE 1 CAPSULE BY MOUTH ONCE A DAY IN THE MORNING    cloNIDine (CATAPRES) 0.1 MG tablet Take by mouth.    diazePAM (VALIUM) 5 MG tablet Take 2 tablets (10 mg total) by mouth 2 (two) times a day for 1 day, THEN 2 tablets (10 mg total) once daily for 1 day, THEN 1 tablet (5 mg total) once daily for 1 day.    dicyclomine (BENTYL) 20 mg tablet Take 20 mg by mouth 4 (four) times daily.    doxepin (SINEQUAN) 150 MG Cap Take 150 mg by mouth every evening.    DULoxetine (CYMBALTA) 60 MG capsule Take 60 mg by mouth.    EScitalopram oxalate (LEXAPRO) 10 MG tablet Take 10 mg by mouth once daily.    FLUoxetine 20 MG capsule Take 3 capsules by mouth once daily.    fluticasone furoate-vilanteroL (BREO ELLIPTA) 100-25 mcg/dose diskus inhaler Inhale 1 puff into the lungs once daily. Controller    folic acid (FOLVITE) 1 MG tablet Take 1 tablet (1 mg total) by mouth once daily.    hydroCHLOROthiazide (HYDRODIURIL) 25 MG tablet     hydrOXYzine pamoate (VISTARIL) 50 MG Cap TAKE ONE CAPSULE BY MOUTH EVERY 6 HOURS AS NEEDED FOR ANXIETY AND/OR EVERY NIGHT AT BEDTIME AS NEEDED FOR INSOMNIA    ipratropium (ATROVENT HFA) 17 mcg/actuation inhaler Inhale 2 puffs into the lungs every 6 (six) hours as needed.    letrozole (FEMARA) 2.5 mg Tab     levothyroxine (SYNTHROID) 50 MCG tablet TAKE 1 TABLET(50  MCG) BY MOUTH BEFORE BREAKFAST    loperamide (IMODIUM) 2 mg capsule Take 2 mg by mouth 4 (four) times daily as needed for Diarrhea (Per package directions).    losartan (COZAAR) 25 MG tablet Take 1 tablet by mouth once daily.    melatonin (MELATIN) Take by mouth nightly as needed for Insomnia.    metFORMIN (GLUCOPHAGE-XR) 500 MG ER 24hr tablet Take 2 tablets (1,000 mg total) by mouth 2 (two) times daily with meals.    metoprolol succinate (TOPROL-XL) 50 MG 24 hr tablet     mirtazapine (REMERON) 30 MG tablet     multivitamin Tab Take 1 tablet by mouth once daily.    nabumetone (RELAFEN) 500 MG tablet Take 500 mg by mouth 2 (two) times daily.    ondansetron (ZOFRAN-ODT) 4 MG TbDL Take 1 tablet (4 mg total) by mouth every 6 (six) hours as needed (nausea/vomiting).    pantoprazole (PROTONIX) 40 MG tablet Take 40 mg by mouth once daily.    propranoloL (INDERAL) 20 MG tablet Take 20 mg by mouth 2 (two) times daily.    thiamine 100 MG tablet Take 1 tablet (100 mg total) by mouth once daily.    traZODone (DESYREL) 300 MG tablet Take 0.5 tablets (150 mg total) by mouth every evening.     Family History       Problem Relation (Age of Onset)    Breast cancer Maternal Aunt, Daughter    Colon cancer Maternal Uncle    Diabetes Father, Mother    Heart attack Father    Hypertension Father, Mother          Tobacco Use    Smoking status: Every Day     Current packs/day: 0.00     Average packs/day: 0.5 packs/day for 30.0 years (15.0 ttl pk-yrs)     Types: Vaping with nicotine, Cigarettes     Start date: 1991     Last attempt to quit: 2021     Years since quittin.8    Smokeless tobacco: Never    Tobacco comments:     Patient is currently smoking 10 cigarettes a day, declines nicotine patches   Substance and Sexual Activity    Alcohol use: Yes     Comment: vodka daily (half a regular bottle) for 4 days    Drug use: Yes     Types: Marijuana     Comment: gummies    Sexual activity: Yes     Birth control/protection: Surgical      Review of Systems   Constitutional:  Positive for activity change. Negative for chills and fever.   HENT:  Negative for sore throat.    Eyes:  Negative for visual disturbance.   Respiratory:  Negative for shortness of breath and wheezing.    Cardiovascular:  Negative for chest pain, palpitations and leg swelling.   Gastrointestinal:  Negative for abdominal distention, abdominal pain, diarrhea, nausea and vomiting.   Genitourinary:  Negative for dysuria and flank pain.   Musculoskeletal:  Positive for back pain.   Skin:  Positive for wound.   Neurological:  Positive for tremors. Negative for seizures, speech difficulty and headaches.   Psychiatric/Behavioral:  Positive for self-injury and suicidal ideas. Negative for confusion and hallucinations. The patient is nervous/anxious.      Objective:     Vital Signs (Most Recent):  Temp: 98.3 °F (36.8 °C) (11/23/23 1757)  Pulse: 103 (11/23/23 2000)  Resp: 20 (11/23/23 2000)  BP: (!) 132/55 (11/23/23 2000)  SpO2: (!) 93 % (11/23/23 2000) Vital Signs (24h Range):  Temp:  [98.3 °F (36.8 °C)] 98.3 °F (36.8 °C)  Pulse:  [103-110] 103  Resp:  [16-20] 20  SpO2:  [93 %-97 %] 93 %  BP: (129-145)/(55-76) 132/55     Weight: 87.1 kg (192 lb)  Body mass index is 30.99 kg/m².     Physical Exam  Vitals and nursing note reviewed.   Constitutional:       General: She is not in acute distress.     Appearance: She is obese. She is ill-appearing.   HENT:      Head: Normocephalic and atraumatic.      Mouth/Throat:      Mouth: Mucous membranes are moist.      Pharynx: Oropharynx is clear.   Eyes:      Extraocular Movements: Extraocular movements intact.      Conjunctiva/sclera: Conjunctivae normal.      Pupils: Pupils are equal, round, and reactive to light.   Cardiovascular:      Rate and Rhythm: Regular rhythm. Tachycardia present.      Pulses: Normal pulses.   Pulmonary:      Effort: Pulmonary effort is normal. No respiratory distress.      Breath sounds: Normal breath sounds. No  wheezing, rhonchi or rales.   Abdominal:      General: Bowel sounds are normal. There is no distension.      Palpations: Abdomen is soft.      Tenderness: There is no abdominal tenderness. There is no guarding.   Musculoskeletal:         General: Signs of injury (bilateral superficial wrist incisions) present.      Cervical back: No tenderness.      Right lower leg: No edema.      Left lower leg: No edema.   Neurological:      General: No focal deficit present.      Mental Status: She is alert and oriented to person, place, and time.   Psychiatric:         Attention and Perception: Attention normal. She does not perceive auditory or visual hallucinations.         Mood and Affect: Mood is anxious. Affect is tearful.         Behavior: Behavior is cooperative.         Thought Content: Thought content includes suicidal ideation.              CRANIAL NERVES     CN III, IV, VI   Pupils are equal, round, and reactive to light.       Significant Labs: All pertinent labs within the past 24 hours have been reviewed.  CBC:   Recent Labs   Lab 23  1846   WBC 34.03*   HGB 11.7*   HCT 38.4   *     CMP:   Recent Labs   Lab 23  1846      K 5.3*      CO2 18*   GLU 59*   BUN 12   CREATININE 0.8   CALCIUM 8.9   PROT 7.7   ALBUMIN 4.4   BILITOT 0.6   ALKPHOS 69   AST 79*   ALT 38   ANIONGAP 24*     Lactic Acid:   Recent Labs   Lab 23  1846   LACTATE 2.1       Significant Imaging: I have reviewed all pertinent imaging results/findings within the past 24 hours.  CXR: I have reviewed all pertinent results/findings within the past 24 hours and my personal findings are:  No acute processes  Assessment/Plan:     * Suicide attempt by cutting of wrist  Presented due to cutting bilateral wrists.  Wounds are superficial and she is hemodynamically stable.  States she has been missing her son who is  and has been drinking in excess.  EtOH level 279 on admit.  Reports that she follows with an outside  Psychiatrist and has seen Dr. Cortes in the past. Takes Abilify as an outpatient.  PEC instituted in the ED.    - Psychiatry consulted, appreciate recs  - f/u [facility]      Alcohol use disorder, severe, dependence  Reports drinking vodka with last drink day of admit.  EtOH 279.  High risk for withdrawal seizures as she has experienced these in the past.  Numerous prior admissions for similar episodes most recently 11/4 - 11/6 requiring a Valium taper.  Has been to residential rehab several times, attended AA meetings, completed IOP in the past.     - Valium 10mg q8h  - Ativan 2mg q4h PRN for CIWA > 8  - CIWA checks  - Thiamine and folate supplementation  - Fall precautions  - Seizure precautions  - [place] on cessation      Alcoholic ketoacidosis  Reports drinking vodka with last drink day of admit.  EtOH 279.  BHB 5.2.  Bicarb 18 Gap 24.  pH 7.388 PCO2 37 PO2 118.  On 2L NC.    - CMP  - Continue to monitor      Depression with anxiety  See Suicide attempt by cutting of wrist.        Type 2 diabetes mellitus with hypoglycemia  A1c 5.5 on 11/4/23.  Takes Metformin at home.  Glucose 59 on admit.    - LDSSI and POCT 4 times daily  - Hypoglycemia precautions  - Diabetic diet    Hyperkalemia  K 5.3 on admit.  EKG without T wave changes.    - CMP  - Lokelma    COPD (chronic obstructive pulmonary disease)  Does not appear to be in acute respiratory distress but is requiring 2L.  CXR unremarkable.    - Continue home inhaler  - Supplemental O2 goal sats 88-92%, wean as tolerated  - [place] on smoking cessation    GERD (gastroesophageal reflux disease)  Chronic and stable    - Continue home pantoprazole      CLL (chronic lymphocytic leukemia)  Chronic and stable.  Not currently on therapy.  Followed by Dr. Montano.    - CBC      Malignant neoplasm of central portion of right breast in female, estrogen receptor positive  Last clinic visit 6/30/22  Letrozole started in February 2021.  Transitioned to anastrozole.     - Continue  anastrozole      Hyperlipidemia  Chronic and stable.    - Continue home statin      Tobacco abuse  Chronic.  COPD and cancer risk factor.    - Nicotine patch  - Encourage cessation      Essential hypertension  /55 on admit.  Concerned that withdrawal will exacerbate.  Home meds include clonidine, metoprolol, losartan, and HCTZ.    - Continue home clonidine  - Continue home metoprolol      VTE Risk Mitigation (From admission, onward)           Ordered     enoxaparin injection 40 mg  Daily         11/23/23 2139     IP VTE HIGH RISK PATIENT  Once         11/23/23 2139     Place sequential compression device  Until discontinued         11/23/23 2119                           On 11/24/2023, patient should be placed in hospital observation services under my care in collaboration with Will Veliz MD.         Sg Yeboah MD  Department of Hospital Medicine  [facility] Dept

## 2023-11-24 NOTE — ED NOTES
Code watch began @0810. PCT MERLIN Almazan assigned to patient. Patient was changed out of her clothes and placed on monitor and O2. Patient clothes collected and placed in a clear patient bag to be secured in the secured patient belongings room.    Patient belongings collected included:   - 1 pair of black pants w/ a floral pattern on the legs  - 1 black shirt  - 1 black leather jacket  - 1 blue [employer] purse     Contents of the purse were inspected by Lesa Mcdonald RN and MERLIN Almazan PCT.    Contents of the purse included:   - 1 pack of cigarettes  -1 credit card amador with 3 cards ( Id, Insurance Card, [employer])  -1 E Cigarette  - 1 cell phone  -1 set of keys  - $200 cash   Purse was turned over to the charge nurse to be secured in the vault

## 2023-11-24 NOTE — ED NOTES
The patient is lying supine in bed, eyes closed, rise/fall of chest noted, lite snoring. DVC maintained for safety.

## 2023-11-24 NOTE — ASSESSMENT & PLAN NOTE
/55 on admit.  Concerned that withdrawal will exacerbate.  Home meds include clonidine, metoprolol, losartan, and HCTZ.    - Continue home clonidine  - Continue home metoprolol

## 2023-11-24 NOTE — ED TRIAGE NOTES
"Earl Abdul, a 63 y.o. female presents to the ED w/ complaint of suicidal ideations. Pt attempted to cut wrists and has verbally stated that she has no intentions of living anymore. Pt has a history of depression and ETOH abuse. Pt states "I drunk a lot of vodka today".    Triage note:  Chief Complaint   Patient presents with    Suicidal     Pt called 911. Pt states she cut her wrists yesterday (superficial). Pt states she doesn't want to live anymore.      Review of patient's allergies indicates:   Allergen Reactions    Lortab [hydrocodone-acetaminophen] Itching    Promethazine Itching and Other (See Comments)     Past Medical History:   Diagnosis Date    Anemia     Anemia     Controlled type 2 diabetes mellitus without complication, without long-term current use of insulin 11/30/2021    COPD (chronic obstructive pulmonary disease)     Depression     Diverticulitis     Fatty liver     GERD (gastroesophageal reflux disease)     Hyperlipidemia     Hypertension     Pancreatitis     Peptic ulcer disease     Polysubstance abuse     Posterior reversible encephalopathy syndrome     Sarcoidosis of lung     Sarcoidosis of lung     over 30 yrs ago    Seizures     7/2017    Suicide attempt     Suicide ideation      "

## 2023-11-24 NOTE — ED NOTES
"The patient is recv'd lying supine in bed attired in a hospital provided gown. The bed is in the lowest locked position w/ both side rails in the upright locked position for safety. She is on cardiac monitoring, oxygen therapy @ 2L/min per N/C. She has a ring to her (L) ring finger silver tone w/ a lite blue square shaped stone. She adamantly refuses to remove jewelry becoming anxious & loud. To her (R) hand she has  a silver tone crown ring to the ring finger & a thin band w/tiny stones to the pinky finger. She states the reason for her presentation is " I drank too much." Patient endorses her ETOH ingestion as a suicide attempt. She continues to endorse SI. Onset of her SI "days. She denies any stressors or changes in lifestyle which may have been the reason for her SI. She denies HI, A/VH. She is maintained on DVC for safety.  "

## 2023-11-24 NOTE — ED NOTES
Opened eyes briefly, dinner tray removed. Patient declines any food/snacks. She did drink a can of Sprite soda. DVC maintained for safety.

## 2023-11-24 NOTE — PHARMACY MED REC
"Admission Medication Reconciliation - Pharmacy Consult Note    The home medication history was taken by Tyson Cedeno PharmD.  Based on information gathered and subsequent review by the clinical pharmacist, the items below may need attention.     You may go to "Admission" then "Reconcile Home Medications" tabs to review and/or act upon these items.     Removed the following medications from the previous list  Acetaminophen  Albuterol inhaler  Anastrozole  Atorvastatin  Buspirone  Butalbital/acetaminophen/caffeine  Cholecalciferol  Clonidine  Diazepam  Duloxetine  Fluoxetine  Folic acid  Hydrochlorothiazide  Ipratropium inhaler  Letrozole  Losartan  Melatonin  Multivitamin  Nabumetone  Pantoprazole  Thiamine    Current Outpatient Medications on File Prior to Encounter   Medication Sig    baclofen (LIORESAL) 10 MG tablet   Take 10 mg by mouth every 8 (eight) hours as needed (back pain).    diclofenac (VOLTAREN) 75 MG EC tablet   Take 75 mg by mouth 2 (two) times daily as needed (pain).    doxepin (SINEQUAN) 150 MG Cap   Take 150 mg by mouth every evening.    EScitalopram oxalate (LEXAPRO) 10 MG tablet   Take 10 mg by mouth once daily.    gabapentin (NEURONTIN) 600 MG tablet   Take 600 mg by mouth 3 (three) times daily as needed (nerve pain, seizures).    hydrOXYzine pamoate (VISTARIL) 25 MG Cap Take 1 - 2 capsules every 6 hours as needed for anxiety      levothyroxine (SYNTHROID) 75 MCG tablet   Take 75 mcg by mouth before breakfast.    loperamide (IMODIUM) 2 mg capsule Take 2 mg by mouth 4 (four) times daily as needed for Diarrhea (Per package directions).      losartan-hydrochlorothiazide 100-25 mg (HYZAAR) 100-25 mg per tablet   Take 1 tablet by mouth once daily.    methocarbamoL (ROBAXIN) 750 MG Tab   Take 750 mg by mouth 3 (three) times daily as needed (muscle spasms).    naltrexone (DEPADE) 50 mg tablet   Take 50 mg by mouth once daily.    omeprazole (PRILOSEC) 20 MG capsule   Take 20 mg by mouth once daily.    " ondansetron (ZOFRAN-ODT) 8 MG TbDL Take 8 mg by mouth 3 (three) times daily as needed (nausea, vomiting).      traZODone (DESYREL) 300 MG tablet   Take 300 mg by mouth every evening    ARIPiprazole (ABILIFY) 5 MG Tab   Take 5 mg by mouth once daily.    dicyclomine (BENTYL) 20 mg tablet   Take 20 mg by mouth 4 (four) times daily.    fluticasone furoate-vilanteroL (BREO ELLIPTA) 100-25 mcg/dose diskus inhaler   Inhale 1 puff into the lungs once daily. Controller    metFORMIN (GLUCOPHAGE-XR) 500 MG ER 24hr tablet   Take 2 tablets (1,000 mg total) by mouth 2 (two) times daily with meals.    mirtazapine (REMERON) 30 MG tablet       propranoloL (INDERAL) 20 MG tablet Take 20 mg by mouth 2 (two) times daily.       Potential issues to be addressed PRIOR TO DISCHARGE  Patient stated still taking propranolol, aripiprazole, and mirtazapine however these have not been filled recently    Please address this information as you see fit.  Feel free to contact us if you have any questions or require assistance.    Tyson Cedeno, PharmD  [facility] [phone]

## 2023-11-24 NOTE — NURSING
Nurses Note -- 4 Eyes      11/24/2023   1:22 AM      Skin assessed during: Admit      [] No Altered Skin Integrity Present    []Prevention Measures Documented      [x] Yes- Altered Skin Integrity Present or Discovered   [x] LDA Added if Not in Epic (Describe Wound)   [] New Altered Skin Integrity was Present on Admit and Documented in LDA   [] Wound Image Taken    Wound Care Consulted? No    Attending Nurse:  CLAUDIA Srivastava    Second RN/Staff Member:   CLAUDIA Patrick

## 2023-11-24 NOTE — SUBJECTIVE & OBJECTIVE
Past Medical History:   Diagnosis Date    Anemia     Anemia     Controlled type 2 diabetes mellitus without complication, without long-term current use of insulin 11/30/2021    COPD (chronic obstructive pulmonary disease)     Depression     Diverticulitis     Fatty liver     GERD (gastroesophageal reflux disease)     Hyperlipidemia     Hypertension     Pancreatitis     Peptic ulcer disease     Polysubstance abuse     Posterior reversible encephalopathy syndrome     Sarcoidosis of lung     Sarcoidosis of lung     over 30 yrs ago    Seizures     7/2017    Suicide attempt     Suicide ideation        Past Surgical History:   Procedure Laterality Date    APPENDECTOMY      BILATERAL MASTECTOMY Bilateral 10/29/2020    Procedure: MASTECTOMY, BILATERAL;  Surgeon: Baylee Kevin MD;  Location: [address];  Service: General;  Laterality: Bilateral;    BREAST REVISION SURGERY Bilateral 2/11/2021    Procedure: BREAST REVISION SURGERY;  Surgeon: Scottie Johnson MD;  Location: [facility] OR [address] [place] [address];  Service: Plastics;  Laterality: Bilateral;    COLONOSCOPY N/A 7/28/2017    Procedure: COLONOSCOPY;  Surgeon: Aaron Alvarado MD;  Location: [facility];  Service: Endoscopy;  Laterality: N/A;    ESOPHAGOGASTRODUODENOSCOPY  10/7/2016, 11/6/2014    2016 - gastritis, duodenitis, 2014 erosive gastritis    ESOPHAGOGASTRODUODENOSCOPY N/A 2/11/2020    Procedure: ESOPHAGOGASTRODUODENOSCOPY (EGD);  Surgeon: Fawn Garrido MD;  Location: [facility];  Service: Endoscopy;  Laterality: N/A;    ESOPHAGOGASTRODUODENOSCOPY N/A 4/19/2021    Procedure: EGD (ESOPHAGOGASTRODUODENOSCOPY);  Surgeon: Paramjit Martino MD;  Location: [facility];  Service: Endoscopy;  Laterality: N/A;    FLEXIBLE SIGMOIDOSCOPY  11/06/2014    colitis    HYSTERECTOMY      IMPLANTATION OF PERMANENT SACRAL NERVE STIMULATOR N/A 7/12/2022    Procedure: INSERTION, NEUROSTIMULATOR, PERMANENT, SACRAL;  Surgeon: Juaquin Edwards MD;  Location: [facility] OR [address] [place] [address];  Service:  Urology;  Laterality: N/A;  1hr    INJECTION FOR SENTINEL NODE IDENTIFICATION Right 10/29/2020    Procedure: INJECTION, FOR SENTINEL NODE IDENTIFICATION;  Surgeon: Baylee Kevin MD;  [facility]: [facility];  Service: General;  Laterality: Right;    INJECTION OF JOINT Right 10/10/2019    Procedure: Injection, Joint RIGHT ILIOPSOAS BURSA/TENDON INJECTION AND RIGHT GLUTEAL TENDON INJECTION WITH STEROID AND LIDOCAINE;  Surgeon: Guillaume Rico MD;  Location: [facility];  Service: Pain Management;  Laterality: Right;  NEEDS CONSENT    INSERTION OF BREAST TISSUE EXPANDER Bilateral 10/29/2020    Procedure: INSERTION, TISSUE EXPANDER, BREAST;  Surgeon: Scottie Johnson MD;  Location: [facility] OR [address] [place] [address];  Service: Plastics;  Laterality: Bilateral;  Right breast: 1082 g  Left breast: 1076 g    LIPOSUCTION Bilateral 2/11/2021    Procedure: LIPOSUCTION;  Surgeon: Scottie Johnson MD;  Location: [facility] OR [address] [place] [address];  Service: Plastics;  Laterality: Bilateral;    mediastenoscopy      REPLACEMENT OF IMPLANT OF BREAST Bilateral 2/11/2021    Procedure: REPLACEMENT, IMPLANT, BREAST;  Surgeon: Scottie Johnson MD;  Location: [address];  Service: Plastics;  Laterality: Bilateral;    SENTINEL LYMPH NODE BIOPSY Right 10/29/2020    Procedure: BIOPSY, LYMPH NODE, SENTINEL;  Surgeon: Baylee Kevin MD;  Location: [address];  Service: General;  Laterality: Right;    TONSILLECTOMY N/A 1970    TUBAL LIGATION         Review of patient's allergies indicates:   Allergen Reactions    Lortab [hydrocodone-acetaminophen] Itching    Promethazine Itching and Other (See Comments)       Current Facility-Administered Medications on File Prior to Encounter   Medication    albuterol sulfate nebulizer solution 2.5 mg     Current Outpatient Medications on File Prior to Encounter   Medication Sig    acetaminophen (TYLENOL) 500 MG tablet Take 500-1,500 mg by mouth daily as needed for Pain.    albuterol (PROVENTIL/VENTOLIN HFA) 90  mcg/actuation inhaler     anastrozole (ARIMIDEX) 1 mg Tab     ARIPiprazole (ABILIFY) 10 MG Tab Take 1 tablet by mouth once daily.    atorvastatin (LIPITOR) 10 MG tablet Take 1 tablet by mouth once daily.    busPIRone (BUSPAR) 15 MG tablet     butalbital-acetaminophen-caffeine -40 mg (FIORICET, ESGIC) -40 mg per tablet Take 1 tablet by mouth every 4 (four) hours as needed.    cholecalciferol, vitamin D3, (VITAMIN D3) 50 mcg (2,000 unit) Cap capsule TAKE 1 CAPSULE BY MOUTH ONCE A DAY IN THE MORNING    cloNIDine (CATAPRES) 0.1 MG tablet Take by mouth.    diazePAM (VALIUM) 5 MG tablet Take 2 tablets (10 mg total) by mouth 2 (two) times a day for 1 day, THEN 2 tablets (10 mg total) once daily for 1 day, THEN 1 tablet (5 mg total) once daily for 1 day.    dicyclomine (BENTYL) 20 mg tablet Take 20 mg by mouth 4 (four) times daily.    doxepin (SINEQUAN) 150 MG Cap Take 150 mg by mouth every evening.    DULoxetine (CYMBALTA) 60 MG capsule Take 60 mg by mouth.    EScitalopram oxalate (LEXAPRO) 10 MG tablet Take 10 mg by mouth once daily.    FLUoxetine 20 MG capsule Take 3 capsules by mouth once daily.    fluticasone furoate-vilanteroL (BREO ELLIPTA) 100-25 mcg/dose diskus inhaler Inhale 1 puff into the lungs once daily. Controller    folic acid (FOLVITE) 1 MG tablet Take 1 tablet (1 mg total) by mouth once daily.    hydroCHLOROthiazide (HYDRODIURIL) 25 MG tablet     hydrOXYzine pamoate (VISTARIL) 50 MG Cap TAKE ONE CAPSULE BY MOUTH EVERY 6 HOURS AS NEEDED FOR ANXIETY AND/OR EVERY NIGHT AT BEDTIME AS NEEDED FOR INSOMNIA    ipratropium (ATROVENT HFA) 17 mcg/actuation inhaler Inhale 2 puffs into the lungs every 6 (six) hours as needed.    letrozole (FEMARA) 2.5 mg Tab     levothyroxine (SYNTHROID) 50 MCG tablet TAKE 1 TABLET(50 MCG) BY MOUTH BEFORE BREAKFAST    loperamide (IMODIUM) 2 mg capsule Take 2 mg by mouth 4 (four) times daily as needed for Diarrhea (Per package directions).    losartan (COZAAR) 25 MG tablet  Take 1 tablet by mouth once daily.    melatonin (MELATIN) Take by mouth nightly as needed for Insomnia.    metFORMIN (GLUCOPHAGE-XR) 500 MG ER 24hr tablet Take 2 tablets (1,000 mg total) by mouth 2 (two) times daily with meals.    metoprolol succinate (TOPROL-XL) 50 MG 24 hr tablet     mirtazapine (REMERON) 30 MG tablet     multivitamin Tab Take 1 tablet by mouth once daily.    nabumetone (RELAFEN) 500 MG tablet Take 500 mg by mouth 2 (two) times daily.    ondansetron (ZOFRAN-ODT) 4 MG TbDL Take 1 tablet (4 mg total) by mouth every 6 (six) hours as needed (nausea/vomiting).    pantoprazole (PROTONIX) 40 MG tablet Take 40 mg by mouth once daily.    propranoloL (INDERAL) 20 MG tablet Take 20 mg by mouth 2 (two) times daily.    thiamine 100 MG tablet Take 1 tablet (100 mg total) by mouth once daily.    traZODone (DESYREL) 300 MG tablet Take 0.5 tablets (150 mg total) by mouth every evening.     Family History       Problem Relation (Age of Onset)    Breast cancer Maternal Aunt, Daughter    Colon cancer Maternal Uncle    Diabetes Father, Mother    Heart attack Father    Hypertension Father, Mother          Tobacco Use    Smoking status: Every Day     Current packs/day: 0.00     Average packs/day: 0.5 packs/day for 30.0 years (15.0 ttl pk-yrs)     Types: Vaping with nicotine, Cigarettes     Start date: 1991     Last attempt to quit: 2021     Years since quittin.8    Smokeless tobacco: Never    Tobacco comments:     Patient is currently smoking 10 cigarettes a day, declines nicotine patches   Substance and Sexual Activity    Alcohol use: Yes     Comment: vodka daily (half a regular bottle) for 4 days    Drug use: Yes     Types: Marijuana     Comment: gummies    Sexual activity: Yes     Birth control/protection: Surgical     Review of Systems   Constitutional:  Positive for activity change. Negative for chills and fever.   HENT:  Negative for sore throat.    Eyes:  Negative for visual disturbance.    Respiratory:  Negative for shortness of breath and wheezing.    Cardiovascular:  Negative for chest pain, palpitations and leg swelling.   Gastrointestinal:  Negative for abdominal distention, abdominal pain, diarrhea, nausea and vomiting.   Genitourinary:  Negative for dysuria and flank pain.   Musculoskeletal:  Positive for back pain.   Skin:  Positive for wound.   Neurological:  Positive for tremors. Negative for seizures, speech difficulty and headaches.   Psychiatric/Behavioral:  Positive for self-injury and suicidal ideas. Negative for confusion and hallucinations. The patient is nervous/anxious.      Objective:     Vital Signs (Most Recent):  Temp: 98.3 °F (36.8 °C) (11/23/23 1757)  Pulse: 103 (11/23/23 2000)  Resp: 20 (11/23/23 2000)  BP: (!) 132/55 (11/23/23 2000)  SpO2: (!) 93 % (11/23/23 2000) Vital Signs (24h Range):  Temp:  [98.3 °F (36.8 °C)] 98.3 °F (36.8 °C)  Pulse:  [103-110] 103  Resp:  [16-20] 20  SpO2:  [93 %-97 %] 93 %  BP: (129-145)/(55-76) 132/55     Weight: 87.1 kg (192 lb)  Body mass index is 30.99 kg/m².     Physical Exam  Vitals and nursing note reviewed.   Constitutional:       General: She is not in acute distress.     Appearance: She is obese. She is ill-appearing.   HENT:      Head: Normocephalic and atraumatic.      Mouth/Throat:      Mouth: Mucous membranes are moist.      Pharynx: Oropharynx is clear.   Eyes:      Extraocular Movements: Extraocular movements intact.      Conjunctiva/sclera: Conjunctivae normal.      Pupils: Pupils are equal, round, and reactive to light.   Cardiovascular:      Rate and Rhythm: Regular rhythm. Tachycardia present.      Pulses: Normal pulses.   Pulmonary:      Effort: Pulmonary effort is normal. No respiratory distress.      Breath sounds: Normal breath sounds. No wheezing, rhonchi or rales.   Abdominal:      General: Bowel sounds are normal. There is no distension.      Palpations: Abdomen is soft.      Tenderness: There is no abdominal tenderness.  There is no guarding.   Musculoskeletal:         General: Signs of injury (bilateral superficial wrist incisions) present.      Cervical back: No tenderness.      Right lower leg: No edema.      Left lower leg: No edema.   Neurological:      General: No focal deficit present.      Mental Status: She is alert and oriented to person, place, and time.   Psychiatric:         Attention and Perception: Attention normal. She does not perceive auditory or visual hallucinations.         Mood and Affect: Mood is anxious. Affect is tearful.         Behavior: Behavior is cooperative.         Thought Content: Thought content includes suicidal ideation.              CRANIAL NERVES     CN III, IV, VI   Pupils are equal, round, and reactive to light.       Significant Labs: All pertinent labs within the past 24 hours have been reviewed.  CBC:   Recent Labs   Lab 11/23/23  1846   WBC 34.03*   HGB 11.7*   HCT 38.4   *     CMP:   Recent Labs   Lab 11/23/23  1846      K 5.3*      CO2 18*   GLU 59*   BUN 12   CREATININE 0.8   CALCIUM 8.9   PROT 7.7   ALBUMIN 4.4   BILITOT 0.6   ALKPHOS 69   AST 79*   ALT 38   ANIONGAP 24*     Lactic Acid:   Recent Labs   Lab 11/23/23  1846   LACTATE 2.1       Significant Imaging: I have reviewed all pertinent imaging results/findings within the past 24 hours.  CXR: I have reviewed all pertinent results/findings within the past 24 hours and my personal findings are:  No acute processes

## 2023-11-24 NOTE — ED NOTES
"Appears asleep w/ eyes closed, rise/fall of chest noted. Opens eyes briefly & asks when she's going to get a bed. Provided explanation of procedure for securing a bed. Patient said "oh, thank you:" & resumed a resting position w/ eyes closed, rise/fall of chest, lite snoring. DVC maintained for safety.  "

## 2023-11-24 NOTE — ASSESSMENT & PLAN NOTE
Last clinic visit 6/30/22  Letrozole started in February 2021.  Transitioned to anastrozole.     - Continue anastrozole

## 2023-11-24 NOTE — ASSESSMENT & PLAN NOTE
A1c 5.5 on 11/4/23.  Takes Metformin at home.  Glucose 59 on admit.    - LDSSI and POCT 4 times daily  - Hypoglycemia precautions  - Diabetic diet

## 2023-11-24 NOTE — ED NOTES
Pt remains in paper scrubs, resting in stretcher comfortably. No signs of distress noted. Sitter remains at bedside in direct visual contact, charting per protocol every 15 minutes. No belongings are in the patients room. Medically necessary equipment remains in the room. Pt aware of plan of care.

## 2023-11-24 NOTE — HPI
Earl Abdul is a 63-year-old female with a medical history of depression, past suicide ideation and attempt, alcohol use disorder, alcohol withdrawl seizures, CLL not on treatment, DM2, COPD, HTN, and HLD who presented to [facility] ED on 23 after self-harm and found to be acutely intoxicated by alcohol.  She tells me that she has been very depressed lately due to missing her son who is .  She made superficial incisions to both wrists two days prior to admission.  She also stated that she had been drinking a lot of vodka.  States she is in pain all over her body and is worried about withdrawing from alcohol.  Denies chest pain, palpitations, abdominal pain, nausea, emesis, melena, and calf pain.  Denies audio and visual hallucinations and agitation.  Alert and oriented to person, place, and time during my examination.  Numerous prior admissions for similar episodes most recently  -  requiring a Valium taper.  Has been to residential rehab several times, attended AA meetings, completed IOP in the past.     In the ED, /55  RR 20 and afebrile with sats 93-96% on 2L.  Labs notable for WBC 34 Hgb 11.7 Plt 105 K 5.3 Bicarb 18 Gap 24 Glucose 59 AST 79 BHB 5.2 EtOH 279.  VBG showed pH 7.388 PCO2 37 PO2 118.  CXR showed no acute findings.  EKG showed sinus tachycardia with a rate 108bpm and QTc 436ms.  Received Duonebs, thiamine, folic acid, and nicotine patch.  She was placed under a PEC.    The patient was admitted to Hospital Medicine for further workup and management.

## 2023-11-24 NOTE — ASSESSMENT & PLAN NOTE
Reports drinking vodka with last drink day of admit.  EtOH 279.  BHB 5.2.  Bicarb 18 Gap 24.  pH 7.388 PCO2 37 PO2 118.  On 2L NC.    - CMP  - Continue to monitor

## 2023-11-25 LAB
ALBUMIN SERPL BCP-MCNC: 3.5 G/DL (ref 3.5–5.2)
ALP SERPL-CCNC: 63 U/L (ref 55–135)
ALT SERPL W/O P-5'-P-CCNC: 35 U/L (ref 10–44)
ANION GAP SERPL CALC-SCNC: 13 MMOL/L (ref 8–16)
AST SERPL-CCNC: 51 U/L (ref 10–40)
BASOPHILS # BLD AUTO: 0.02 K/UL (ref 0–0.2)
BASOPHILS NFR BLD: 0.3 % (ref 0–1.9)
BILIRUB SERPL-MCNC: 0.6 MG/DL (ref 0.1–1)
BUN SERPL-MCNC: 7 MG/DL (ref 8–23)
CALCIUM SERPL-MCNC: 8.2 MG/DL (ref 8.7–10.5)
CHLORIDE SERPL-SCNC: 98 MMOL/L (ref 95–110)
CO2 SERPL-SCNC: 27 MMOL/L (ref 23–29)
CREAT SERPL-MCNC: 0.9 MG/DL (ref 0.5–1.4)
DIFFERENTIAL METHOD: ABNORMAL
EOSINOPHIL # BLD AUTO: 0.1 K/UL (ref 0–0.5)
EOSINOPHIL NFR BLD: 0.7 % (ref 0–8)
ERYTHROCYTE [DISTWIDTH] IN BLOOD BY AUTOMATED COUNT: 18 % (ref 11.5–14.5)
EST. GFR  (NO RACE VARIABLE): >60 ML/MIN/1.73 M^2
GLUCOSE SERPL-MCNC: 227 MG/DL (ref 70–110)
HCT VFR BLD AUTO: 31.4 % (ref 37–48.5)
HGB BLD-MCNC: 9.9 G/DL (ref 12–16)
IMM GRANULOCYTES # BLD AUTO: 0.03 K/UL (ref 0–0.04)
IMM GRANULOCYTES NFR BLD AUTO: 0.4 % (ref 0–0.5)
LYMPHOCYTES # BLD AUTO: 4.4 K/UL (ref 1–4.8)
LYMPHOCYTES NFR BLD: 63.5 % (ref 18–48)
MCH RBC QN AUTO: 26.7 PG (ref 27–31)
MCHC RBC AUTO-ENTMCNC: 31.5 G/DL (ref 32–36)
MCV RBC AUTO: 85 FL (ref 82–98)
MONOCYTES # BLD AUTO: 0.3 K/UL (ref 0.3–1)
MONOCYTES NFR BLD: 4.4 % (ref 4–15)
NEUTROPHILS # BLD AUTO: 2.1 K/UL (ref 1.8–7.7)
NEUTROPHILS NFR BLD: 30.7 % (ref 38–73)
NRBC BLD-RTO: 0 /100 WBC
PLATELET # BLD AUTO: 67 K/UL (ref 150–450)
PMV BLD AUTO: 11 FL (ref 9.2–12.9)
POCT GLUCOSE: 161 MG/DL (ref 70–110)
POCT GLUCOSE: 179 MG/DL (ref 70–110)
POCT GLUCOSE: 193 MG/DL (ref 70–110)
POTASSIUM SERPL-SCNC: 3.4 MMOL/L (ref 3.5–5.1)
PROT SERPL-MCNC: 6.3 G/DL (ref 6–8.4)
RBC # BLD AUTO: 3.71 M/UL (ref 4–5.4)
SODIUM SERPL-SCNC: 138 MMOL/L (ref 136–145)
WBC # BLD AUTO: 6.98 K/UL (ref 3.9–12.7)

## 2023-11-25 PROCEDURE — S4991 NICOTINE PATCH NONLEGEND: HCPCS | Performed by: EMERGENCY MEDICINE

## 2023-11-25 PROCEDURE — 80053 COMPREHEN METABOLIC PANEL: CPT

## 2023-11-25 PROCEDURE — 94640 AIRWAY INHALATION TREATMENT: CPT

## 2023-11-25 PROCEDURE — 25000003 PHARM REV CODE 250

## 2023-11-25 PROCEDURE — 94761 N-INVAS EAR/PLS OXIMETRY MLT: CPT

## 2023-11-25 PROCEDURE — 85025 COMPLETE CBC W/AUTO DIFF WBC: CPT

## 2023-11-25 PROCEDURE — 63600175 PHARM REV CODE 636 W HCPCS

## 2023-11-25 PROCEDURE — 99900035 HC TECH TIME PER 15 MIN (STAT)

## 2023-11-25 PROCEDURE — 11000001 HC ACUTE MED/SURG PRIVATE ROOM

## 2023-11-25 PROCEDURE — 25000003 PHARM REV CODE 250: Performed by: EMERGENCY MEDICINE

## 2023-11-25 PROCEDURE — 36415 COLL VENOUS BLD VENIPUNCTURE: CPT

## 2023-11-25 PROCEDURE — 27000221 HC OXYGEN, UP TO 24 HOURS

## 2023-11-25 RX ORDER — SIMETHICONE 80 MG
1 TABLET,CHEWABLE ORAL 3 TIMES DAILY PRN
Status: DISCONTINUED | OUTPATIENT
Start: 2023-11-25 | End: 2023-11-29 | Stop reason: HOSPADM

## 2023-11-25 RX ORDER — LOPERAMIDE HYDROCHLORIDE 2 MG/1
2 CAPSULE ORAL 4 TIMES DAILY PRN
Status: DISCONTINUED | OUTPATIENT
Start: 2023-11-25 | End: 2023-11-29 | Stop reason: HOSPADM

## 2023-11-25 RX ORDER — TRAZODONE HYDROCHLORIDE 100 MG/1
200 TABLET ORAL NIGHTLY
Status: DISCONTINUED | OUTPATIENT
Start: 2023-11-25 | End: 2023-11-29 | Stop reason: HOSPADM

## 2023-11-25 RX ORDER — DIAZEPAM 5 MG/1
10 TABLET ORAL EVERY 8 HOURS
Status: DISCONTINUED | OUTPATIENT
Start: 2023-11-25 | End: 2023-11-25

## 2023-11-25 RX ORDER — DIAZEPAM 5 MG/1
10 TABLET ORAL EVERY 6 HOURS
Status: DISCONTINUED | OUTPATIENT
Start: 2023-11-25 | End: 2023-11-27

## 2023-11-25 RX ADMIN — LOSARTAN POTASSIUM 100 MG: 50 TABLET, FILM COATED ORAL at 09:11

## 2023-11-25 RX ADMIN — ESCITALOPRAM OXALATE 10 MG: 10 TABLET ORAL at 09:11

## 2023-11-25 RX ADMIN — DIAZEPAM 10 MG: 5 TABLET ORAL at 12:11

## 2023-11-25 RX ADMIN — DIAZEPAM 10 MG: 5 TABLET ORAL at 05:11

## 2023-11-25 RX ADMIN — GABAPENTIN 600 MG: 300 CAPSULE ORAL at 08:11

## 2023-11-25 RX ADMIN — FOLIC ACID 1 MG: 1 TABLET ORAL at 09:11

## 2023-11-25 RX ADMIN — HYDROCHLOROTHIAZIDE 25 MG: 25 TABLET ORAL at 09:11

## 2023-11-25 RX ADMIN — LORAZEPAM 2 MG: 2 INJECTION INTRAMUSCULAR; INTRAVENOUS at 09:11

## 2023-11-25 RX ADMIN — Medication 100 MG: at 09:11

## 2023-11-25 RX ADMIN — GABAPENTIN 600 MG: 300 CAPSULE ORAL at 09:11

## 2023-11-25 RX ADMIN — FLUTICASONE FUROATE AND VILANTEROL TRIFENATATE 1 PUFF: 100; 25 POWDER RESPIRATORY (INHALATION) at 10:11

## 2023-11-25 RX ADMIN — LOPERAMIDE HYDROCHLORIDE 2 MG: 2 CAPSULE ORAL at 02:11

## 2023-11-25 RX ADMIN — ACETAMINOPHEN 650 MG: 325 TABLET ORAL at 03:11

## 2023-11-25 RX ADMIN — TRAZODONE HYDROCHLORIDE 200 MG: 100 TABLET ORAL at 08:11

## 2023-11-25 RX ADMIN — THERA TABS 1 TABLET: TAB at 09:11

## 2023-11-25 RX ADMIN — NICOTINE 1 PATCH: 14 PATCH, EXTENDED RELEASE TRANSDERMAL at 08:11

## 2023-11-25 RX ADMIN — LOPERAMIDE HYDROCHLORIDE 2 MG: 2 CAPSULE ORAL at 09:11

## 2023-11-25 RX ADMIN — PANTOPRAZOLE SODIUM 40 MG: 40 TABLET, DELAYED RELEASE ORAL at 09:11

## 2023-11-25 RX ADMIN — ENOXAPARIN SODIUM 40 MG: 40 INJECTION SUBCUTANEOUS at 05:11

## 2023-11-25 RX ADMIN — GABAPENTIN 600 MG: 300 CAPSULE ORAL at 02:11

## 2023-11-25 RX ADMIN — LORAZEPAM 2 MG: 2 INJECTION INTRAMUSCULAR; INTRAVENOUS at 08:11

## 2023-11-25 RX ADMIN — POTASSIUM BICARBONATE 40 MEQ: 391 TABLET, EFFERVESCENT ORAL at 09:11

## 2023-11-25 RX ADMIN — LEVOTHYROXINE SODIUM 75 MCG: 75 TABLET ORAL at 05:11

## 2023-11-25 NOTE — SUBJECTIVE & OBJECTIVE
Interval History: Tremors have decreased. More alert and engaged. Patient denies recent self harm or suicide attempt. She denies thoughts of harming self or others. Psychiatry consulted as PEC expires 11/26/23.    Review of Systems   Constitutional:  Positive for activity change. Negative for chills and fever.   HENT:  Negative for sore throat.    Respiratory:  Negative for shortness of breath and wheezing.    Cardiovascular:  Negative for chest pain and palpitations.   Gastrointestinal:  Negative for abdominal distention, abdominal pain, constipation, diarrhea, nausea and vomiting.   Genitourinary:  Negative for dysuria and flank pain.   Neurological:  Negative for tremors and seizures.   Psychiatric/Behavioral:  Negative for confusion and hallucinations. The patient is nervous/anxious.      Objective:     Vital Signs (Most Recent):  Temp: 97.8 °F (36.6 °C) (11/25/23 0724)  Pulse: 72 (11/25/23 0724)  Resp: 18 (11/25/23 0724)  BP: 135/65 (11/25/23 0724)  SpO2: 95 % (11/25/23 0724) Vital Signs (24h Range):  Temp:  [97.8 °F (36.6 °C)-99 °F (37.2 °C)] 97.8 °F (36.6 °C)  Pulse:  [] 72  Resp:  [16-18] 18  SpO2:  [93 %-97 %] 95 %  BP: (123-149)/(58-68) 135/65     Weight: 86.3 kg (190 lb 4.1 oz)  Body mass index is 30.71 kg/m².    Intake/Output Summary (Last 24 hours) at 11/25/2023 0802  Last data filed at 11/25/2023 0624  Gross per 24 hour   Intake --   Output 600 ml   Net -600 ml         Physical Exam  Constitutional:       General: She is not in acute distress.     Appearance: Normal appearance.   HENT:      Head: Normocephalic and atraumatic.      Nose: Nose normal.      Mouth/Throat:      Mouth: Mucous membranes are moist.      Pharynx: Oropharynx is clear.   Eyes:      Extraocular Movements: Extraocular movements intact.      Conjunctiva/sclera: Conjunctivae normal.   Cardiovascular:      Rate and Rhythm: Normal rate and regular rhythm.   Pulmonary:      Effort: Pulmonary effort is normal.      Breath sounds:  Normal breath sounds.   Abdominal:      General: Abdomen is flat.      Palpations: Abdomen is soft.   Musculoskeletal:      Right lower leg: No edema.      Left lower leg: No edema.   Skin:     General: Skin is warm and dry.   Neurological:      Mental Status: She is alert and oriented to person, place, and time. Mental status is at baseline.   Psychiatric:         Thought Content: Thought content normal.      Comments: Reserved mood. Decreased insight. Denies recent self-harm.            Significant Labs: All pertinent labs within the past 24 hours have been reviewed.    Significant Imaging: I have reviewed all pertinent imaging results/findings within the past 24 hours.

## 2023-11-25 NOTE — PLAN OF CARE
Went over plan of care . All questions answered. Sitter at bedside. Pt continues to ask for valium which I explained is scheduled. Wants ativan and I explain she is doing so well with out it - then patient goes back to sleep. No injuries. Will continue to monitor

## 2023-11-25 NOTE — NURSING
Notified Dr. Canela via secure chat that the patient's CIWA is a 9 with anxiety and tremors. She instructed me to give the PRN Ativan.

## 2023-11-25 NOTE — ASSESSMENT & PLAN NOTE
Does not appear to be in acute respiratory distress but is requiring 2L.  CXR unremarkable.    - Continue home inhaler  - Supplemental O2 goal sats 88-92%, wean as tolerated  - [place] on smoking cessation

## 2023-11-25 NOTE — ASSESSMENT & PLAN NOTE
K 5.3 on admit.  EKG without T wave changes. Lokelma in ED.    - Daily CMP  - Shift or replete PRN

## 2023-11-25 NOTE — ASSESSMENT & PLAN NOTE
WBC 34 > 16 > 7 during hospitalization.   Intermittent tachycardia and hypertension.    Suspect 2/2 alcohol withdrawal rather than infection.

## 2023-11-25 NOTE — ASSESSMENT & PLAN NOTE
Reports drinking vodka with last drink day of admit.  EtOH 279.  High risk for withdrawal seizures as she has experienced these in the past.  Numerous prior admissions for similar episodes most recently 11/4 - 11/6 requiring a Valium taper.  Has been to residential rehab several times, attended AA meetings, completed IOP in the past.     - Valium 10mg q6h  - Ativan 2mg q4h PRN for CIWA > 8  - CIWA checks  - Thiamine, folate, multivitamin supplementation  - Fall precautions  - Seizure precautions  - [place] on cessation

## 2023-11-25 NOTE — ASSESSMENT & PLAN NOTE
Presented due to cutting bilateral wrists.  Wounds are superficial and she is hemodynamically stable.  States she has been missing her son who is  and has been drinking in excess.  EtOH level 279 on admit.  Reports that she follows with an outside Psychiatrist and has seen Dr. Cortes in the past. Abilify prescribed but patient not taking at home. PEC instituted in the ED.    - Psychiatry consulted, appreciate recs

## 2023-11-25 NOTE — PROGRESS NOTES
[facility] Medicine  Progress Note    Patient Name: Earl Abdul  MRN: [id number]  Patient Class: IP- Inpatient   Admission Date: 2023  Length of Stay: 1 days  Attending Physician: Kirk Goetz MD  Primary Care Provider: Andrew Rodriguez MD    Subjective:     Principal Problem:Suicide attempt by cutting of wrist    HPI:  Earl Abdul is a 63-year-old female with a medical history of depression, past suicide ideation and attempt, alcohol use disorder, alcohol withdrawl seizures, CLL not on treatment, DM2, COPD, HTN, and HLD who presented to [facility] ED on 23 after self-harm and found to be acutely intoxicated by alcohol.  She tells me that she has been very depressed lately due to missing her son who is .  She made superficial incisions to both wrists two days prior to admission.  She also stated that she had been drinking a lot of vodka.  States she is in pain all over her body and is worried about withdrawing from alcohol.  Denies chest pain, palpitations, abdominal pain, nausea, emesis, melena, and calf pain.  Denies audio and visual hallucinations and agitation.  Alert and oriented to person, place, and time during my examination.  Numerous prior admissions for similar episodes most recently  -  requiring a Valium taper.  Has been to residential rehab several times, attended AA meetings, completed IOP in the past.     In the ED, /55  RR 20 and afebrile with sats 93-96% on 2L.  Labs notable for WBC 34 Hgb 11.7 Plt 105 K 5.3 Bicarb 18 Gap 24 Glucose 59 AST 79 BHB 5.2 EtOH 279.  VBG showed pH 7.388 PCO2 37 PO2 118.  CXR showed no acute findings.  EKG showed sinus tachycardia with a rate 108bpm and QTc 436ms.  Received Duonebs, thiamine, folic acid, and nicotine patch.  She was placed under a PEC.    The patient was admitted to Hospital Medicine for further workup and management.    Overview/Hospital Course:  PEC on 23. Patient tremulous during  normal mood with appropriate affect withdrawal period, on Valium q6h and CIWA precautions. Denies hallucinations or seizures, however patient's  states she had seizures 6 or 7 years ago. Psychiatry has been consulted for recent SA and PEC status, appreciate recs.     Interval History: Tremors have decreased. More alert and engaged. Patient denies recent self harm or suicide attempt. She denies thoughts of harming self or others. Psychiatry consulted as PEC expires 11/26/23.    Review of Systems   Constitutional:  Positive for activity change. Negative for chills and fever.   HENT:  Negative for sore throat.    Respiratory:  Negative for shortness of breath and wheezing.    Cardiovascular:  Negative for chest pain and palpitations.   Gastrointestinal:  Negative for abdominal distention, abdominal pain, constipation, diarrhea, nausea and vomiting.   Genitourinary:  Negative for dysuria and flank pain.   Neurological:  Negative for tremors and seizures.   Psychiatric/Behavioral:  Negative for confusion and hallucinations. The patient is nervous/anxious.      Objective:     Vital Signs (Most Recent):  Temp: 97.8 °F (36.6 °C) (11/25/23 0724)  Pulse: 72 (11/25/23 0724)  Resp: 18 (11/25/23 0724)  BP: 135/65 (11/25/23 0724)  SpO2: 95 % (11/25/23 0724) Vital Signs (24h Range):  Temp:  [97.8 °F (36.6 °C)-99 °F (37.2 °C)] 97.8 °F (36.6 °C)  Pulse:  [] 72  Resp:  [16-18] 18  SpO2:  [93 %-97 %] 95 %  BP: (123-149)/(58-68) 135/65     Weight: 86.3 kg (190 lb 4.1 oz)  Body mass index is 30.71 kg/m².    Intake/Output Summary (Last 24 hours) at 11/25/2023 0802  Last data filed at 11/25/2023 0624  Gross per 24 hour   Intake --   Output 600 ml   Net -600 ml         Physical Exam  Constitutional:       General: She is not in acute distress.     Appearance: Normal appearance.   HENT:      Head: Normocephalic and atraumatic.      Nose: Nose normal.      Mouth/Throat:      Mouth: Mucous membranes are moist.      Pharynx: Oropharynx is clear.   Eyes:       Extraocular Movements: Extraocular movements intact.      Conjunctiva/sclera: Conjunctivae normal.   Cardiovascular:      Rate and Rhythm: Normal rate and regular rhythm.   Pulmonary:      Effort: Pulmonary effort is normal.      Breath sounds: Normal breath sounds.   Abdominal:      General: Abdomen is flat.      Palpations: Abdomen is soft.   Musculoskeletal:      Right lower leg: No edema.      Left lower leg: No edema.   Skin:     General: Skin is warm and dry.   Neurological:      Mental Status: She is alert and oriented to person, place, and time. Mental status is at baseline.   Psychiatric:         Thought Content: Thought content normal.      Comments: Reserved mood. Decreased insight. Denies recent self-harm.            Significant Labs: All pertinent labs within the past 24 hours have been reviewed.    Significant Imaging: I have reviewed all pertinent imaging results/findings within the past 24 hours.    Assessment/Plan:      * Suicide attempt by cutting of wrist  Presented due to cutting bilateral wrists.  Wounds are superficial and she is hemodynamically stable.  States she has been missing her son who is  and has been drinking in excess.  EtOH level 279 on admit.  Reports that she follows with an outside Psychiatrist and has seen Dr. Cortes in the past. Abilify prescribed but patient not taking at home. PEC instituted in the ED.    - Psychiatry consulted, appreciate recs    SIRS (systemic inflammatory response syndrome)  WBC 34 > 16 > 7 during hospitalization.   Intermittent tachycardia and hypertension.    Suspect 2/2 alcohol withdrawal rather than infection.    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   Voiding trial prior to discharge.    Hyperkalemia  K 5.3 on admit.  EKG without T wave changes. Lokelma in ED.    - Daily CMP  - Shift or replete PRN    GERD (gastroesophageal reflux disease)  Chronic and stable    - Continue home pantoprazole      CLL (chronic  lymphocytic leukemia)  Chronic and stable.  Not currently on therapy.  Followed by Dr. Montano.    - CBC      Alcoholic ketoacidosis  Reports drinking vodka with last drink day of admit.  EtOH 279.  BHB 5.2.  Bicarb 18 Gap 24.  pH 7.388 PCO2 37 PO2 118.  On 2L NC.    - CMP  - Continue to monitor  - D5 with NS gtt    Type 2 diabetes mellitus with hypoglycemia  A1c 5.5 on 11/4/23.  Takes Metformin at home.  Glucose 59 on admit.    - LDSSI and POCT 4 times daily  - Hypoglycemia precautions  - Diabetic diet    COPD (chronic obstructive pulmonary disease)  Does not appear to be in acute respiratory distress but is requiring 2L.  CXR unremarkable.    - Continue home inhaler  - Supplemental O2 goal sats 88-92%, wean as tolerated  - [place] on smoking cessation    Malignant neoplasm of central portion of right breast in female, estrogen receptor positive  Last clinic visit 6/30/22  Letrozole started in February 2021.  Transitioned to anastrozole but patient not taking.    - F/u outpatient    Thrombocytopenia  Patient was found to have thrombocytopenia, the likely etiology is secondary to alcohol use disorder, less likely but possible CLL contributory. Will monitor the platelets Daily. Will transfuse if platelet count is <10k. Hold DVT prophylaxis if platelets are <50k. The patient's platelet results have been reviewed and are listed below.  Recent Labs   Lab 11/25/23  0453   PLT 67*         Hyperlipidemia  Chronic and stable. Statin prescribed but patient not taking.     - F/u outpatient      Depression with anxiety  See Suicide attempt by cutting of wrist.    - Continue home escitalopram        Tobacco abuse  Chronic.  COPD and cancer risk factor.    - Nicotine patch  - Encourage cessation      Essential hypertension  /55 on admit.  Concerned that withdrawal will exacerbate.  Home meds include clonidine, metoprolol, losartan, and HCTZ but patient not taking.    - F/u outpatient    Alcohol use disorder, severe,  dependence  Reports drinking vodka with last drink day of admit.  EtOH 279.  High risk for withdrawal seizures as she has experienced these in the past.  Numerous prior admissions for similar episodes most recently 11/4 - 11/6 requiring a Valium taper.  Has been to residential rehab several times, attended AA meetings, completed IOP in the past.     - Valium 10mg q6h  - Ativan 2mg q4h PRN for CIWA > 8  - CIWA checks  - Thiamine, folate, multivitamin supplementation  - Fall precautions  - Seizure precautions  - [place] on cessation        VTE Risk Mitigation (From admission, onward)           Ordered     enoxaparin injection 40 mg  Daily         11/23/23 2139     IP VTE HIGH RISK PATIENT  Once         11/23/23 2139     Place sequential compression device  Until discontinued         11/23/23 2119                    Discharge Planning   BECCA: 11/27/2023     Code Status: Full Code   Is the patient medically ready for discharge?: No    Reason for patient still in hospital (select all that apply): Patient trending condition             Tahira Canela MD  Department of Hospital Medicine   [facility] - Med Surg

## 2023-11-25 NOTE — ASSESSMENT & PLAN NOTE
/55 on admit.  Concerned that withdrawal will exacerbate.  Home meds include clonidine, metoprolol, losartan, and HCTZ but patient not taking.    - F/u outpatient

## 2023-11-25 NOTE — ASSESSMENT & PLAN NOTE
Last clinic visit 6/30/22  Letrozole started in February 2021.  Transitioned to anastrozole but patient not taking.    - F/u outpatient

## 2023-11-25 NOTE — PROGRESS NOTES
"CONSULTATION LIAISON PSYCHIATRY PROGRESS NOTE    Patient Name: Earl Abdul  MRN: [id number]  Patient Class: IP- Inpatient  Admission Date: 11/23/2023  Attending Physician: Kirk Goetz MD      SUBJECTIVE:   Earl Abdul is a 63 y.o. female with past psychiatric history of depression with SA in 2017, alcohol use disorder complicated by seizures, tobacco use disorder & past pertinent medical history of sarcoidosis, COPD, CLL, T2DM, HTN, HLD, fibromyalgia presents to the ED/admitted to the hospital for Suicide attempt by cutting of wrist and alcoholic ketoacidosis.     Psychiatry consulted for "PEC for SA; alcohol use disorder"    Interval events: no acute events overnight. Resumed home meds - lexapro 10mg, trazodone 150mg. Started on valium taper. Did not require PRN ativan for withdrawal symptoms. No behavioral PRNs required. Adherent to scheduled meds.     Pt interviewed at the bedside today,  present as well. Pt reports she has not been sleeping well, though this is not new for her. States her mood is "okay." Discussed with pt and  options for alcohol use disorder treatment, pt currently enrolled at [facility] here, though was not excited by the efficacy of "blowing on a breathalyzer twice a week." Discussed at length the possibility of residential rehab but the pt is not interested in this at this time, does not feel she needs it or would benefit from it, as she recently attended such a program. Pt denies SI/HI/AVH, and denies wanting to harm herself prior to hospitalization. Reports she is dealing with a lot surrounding the loss of her son and ended up drinking a lot, and that she did not want to harm herself. Feels well on her current dose of lexapro. Asked if the trazodone could be increased, "I need 600mg ... or 900mg."       OBJECTIVE:    Mental Status Exam:  General Appearance: adequately groomed, dressed in hospital garb, lying in bed, appears older than stated age  Behavior: " "normal, cooperative, polite, appropriate eye-contact, under good behavioral control  Involuntary Movements and Motor Activity: no tics, no tremors, no akathisia, no dystonia, no evidence of tardive dyskinesia, tremor of bilateral upper extremities noted at rest  Gait and Station: unable to assess - patient lying down or seated  Speech and Language: normal rate, rhythm, volume, tone, and pitch  Mood: "okay"  Affect: constricted, irritable  Thought Process and Associations: linear, organized  Thought Content and Perceptions::  denies SI/HI/AVH  Sensorium and Orientation: grossly intact  Recent and Remote Memory: grossly intact  Attention and Concentration: grossly intact  Fund of Knowledge: grossly intact  Insight: limited/partial awareness of illness  Judgment: impaired due to limited insight into severity of alcohol use disorder    CAM ICU positive? no      ASSESSMENT & RECOMMENDATIONS   Alcohol Use Disorder, Severe, w/ hx of complicated withdrawal seizures  Unspecified Depressive Disorder c/b SA v SI w/ SIB via cutting vs SIMD  R/o Prolonged Grief Disorder  R/o Adjustment disorder w/ depressed mood    PSYCH MEDICATIONS  Scheduled: continue valium 10mg q6h   Continue lexapro 10mg daily   INCREASE trazodone to 200mg nightly   Continue daily Thiamine, Folate, and Multivitamins   PRN: PRN Ativan 2mg q6hrs for CIWA>8 or 2 of 3: SBP>160, DBP>100, HR>110. Please correlate with pt's concurrent underlying medical hx and clinical setting that may mimic elevated VS.   Continue CIWA with VS check q4h while awake    RISK ASSESSMENT  Continue PEC (PEC signed 11/23/23 @2102hrs) because patient is in imminent danger of hurting self or others and is gravely disabled. & NEEDS 1:1 sitter  Please have  assess patient prior to expiration of PEC.    FOLLOW UP  Will follow up while in house    DISPOSITION - once medically cleared:  Patient may be discharged home with next of kin with outpatient psychaitric follow up/rehab. "     Please contact ON CALL psychiatry service (24/7) for any acute issues that may arise.    Dr. Philippe Mclaughlin MD  [facility] Psychiat[facility] PGY-II  CL Psychiatry  [facility]  11/25/2023 2:19 PM        --------------------------------------------------------------------------------------------------------------------------------------------------------------------------------------------------------------------------------------    CONTINUED OBJECTIVE clinical data & findings reviewed and noted for above decision making    Current Medications:   Scheduled Meds:    diazePAM  10 mg Oral Q6H    enoxparin  40 mg Subcutaneous Daily    EScitalopram oxalate  10 mg Oral Daily    fluticasone furoate-vilanteroL  1 puff Inhalation Daily    folic acid  1 mg Oral Daily    gabapentin  600 mg Oral TID    hydroCHLOROthiazide  25 mg Oral Daily    levothyroxine  75 mcg Oral Before breakfast    losartan  100 mg Oral Daily    multivitamin  1 tablet Oral Daily    nicotine  1 patch Transdermal Daily    pantoprazole  40 mg Oral Daily    thiamine  100 mg Oral Daily    traZODone  150 mg Oral QHS     PRN Meds: acetaminophen, dextrose 10%, dextrose 10%, glucagon (human recombinant), glucose, glucose, insulin aspart U-100, loperamide, lorazepam, melatonin, naloxone, ondansetron, simethicone, sodium chloride 0.9%    Allergies:   Review of patient's allergies indicates:   Allergen Reactions    Lortab [hydrocodone-acetaminophen] Itching    Promethazine Itching and Other (See Comments)       Vitals  Vitals:    11/25/23 1222   BP: 139/63   Pulse: 96   Resp: 18   Temp: 98.2 °F (36.8 °C)       Labs/Imaging/Studies:  Recent Results (from the past 24 hour(s))   COVID-19 Routine Screening    Collection Time: 11/24/23  2:32 PM   Result Value Ref Range    SARS-CoV2 (COVID-19) Qualitative PCR Not Detected Not Detected   POCT glucose    Collection Time: 11/24/23  4:13 PM   Result Value Ref Range    POCT Glucose 174 (H) 70 - 110 mg/dL    POCT glucose    Collection Time: 11/24/23  7:59 PM   Result Value Ref Range    POCT Glucose 109 70 - 110 mg/dL   Comprehensive Metabolic Panel (CMP)    Collection Time: 11/25/23  4:53 AM   Result Value Ref Range    Sodium 138 136 - 145 mmol/L    Potassium 3.4 (L) 3.5 - 5.1 mmol/L    Chloride 98 95 - 110 mmol/L    CO2 27 23 - 29 mmol/L    Glucose 227 (H) 70 - 110 mg/dL    BUN 7 (L) 8 - 23 mg/dL    Creatinine 0.9 0.5 - 1.4 mg/dL    Calcium 8.2 (L) 8.7 - 10.5 mg/dL    Total Protein 6.3 6.0 - 8.4 g/dL    Albumin 3.5 3.5 - 5.2 g/dL    Total Bilirubin 0.6 0.1 - 1.0 mg/dL    Alkaline Phosphatase 63 55 - 135 U/L    AST 51 (H) 10 - 40 U/L    ALT 35 10 - 44 U/L    eGFR >60.0 >60 mL/min/1.73 m^2    Anion Gap 13 8 - 16 mmol/L   CBC with Automated Differential    Collection Time: 11/25/23  4:53 AM   Result Value Ref Range    WBC 6.98 3.90 - 12.70 K/uL    RBC 3.71 (L) 4.00 - 5.40 M/uL    Hemoglobin 9.9 (L) 12.0 - 16.0 g/dL    Hematocrit 31.4 (L) 37.0 - 48.5 %    MCV 85 82 - 98 fL    MCH 26.7 (L) 27.0 - 31.0 pg    MCHC 31.5 (L) 32.0 - 36.0 g/dL    RDW 18.0 (H) 11.5 - 14.5 %    Platelets 67 (L) 150 - 450 K/uL    MPV 11.0 9.2 - 12.9 fL    Immature Granulocytes 0.4 0.0 - 0.5 %    Gran # (ANC) 2.1 1.8 - 7.7 K/uL    Immature Grans (Abs) 0.03 0.00 - 0.04 K/uL    Lymph # 4.4 1.0 - 4.8 K/uL    Mono # 0.3 0.3 - 1.0 K/uL    Eos # 0.1 0.0 - 0.5 K/uL    Baso # 0.02 0.00 - 0.20 K/uL    nRBC 0 0 /100 WBC    Gran % 30.7 (L) 38.0 - 73.0 %    Lymph % 63.5 (H) 18.0 - 48.0 %    Mono % 4.4 4.0 - 15.0 %    Eosinophil % 0.7 0.0 - 8.0 %    Basophil % 0.3 0.0 - 1.9 %    Differential Method Automated    POCT glucose    Collection Time: 11/25/23 12:21 PM   Result Value Ref Range    POCT Glucose 193 (H) 70 - 110 mg/dL     Imaging Results              X-Ray Chest AP Portable (Final result)  Result time 11/23/23 20:02:18      Final result by Uche Montano MD (11/23/23 20:02:18)                   Impression:      No acute intrathoracic  process.      Electronically signed by: Uche Montano MD  Date:    11/23/2023  Time:    20:02               Narrative:    EXAMINATION:  XR CHEST AP PORTABLE    CLINICAL HISTORY:  Hypoxemia    TECHNIQUE:  Single frontal view of the chest was performed.    COMPARISON:  11/14/2023.    FINDINGS:  There are postoperative changes in the right axillary region and the right chest wall.  Monitoring EKG leads are present.    The trachea is unremarkable.  There are calcifications of the aortic knob.  The cardiomediastinal silhouette is within normal limits.  There is no evidence of free air beneath the hemidiaphragms.  There are no pleural effusions.  There is no evidence of a pneumothorax.  There is no evidence of pneumomediastinum.  No airspace opacity is present.  There are degenerative changes in the osseous structures.

## 2023-11-25 NOTE — ASSESSMENT & PLAN NOTE
Patient was found to have thrombocytopenia, the likely etiology is secondary to alcohol use disorder, less likely but possible CLL contributory. Will monitor the platelets Daily. Will transfuse if platelet count is <10k. Hold DVT prophylaxis if platelets are <50k. The patient's platelet results have been reviewed and are listed below.  Recent Labs   Lab 11/25/23  0453   PLT 67*

## 2023-11-25 NOTE — ASSESSMENT & PLAN NOTE
Reports drinking vodka with last drink day of admit.  EtOH 279.  BHB 5.2.  Bicarb 18 Gap 24.  pH 7.388 PCO2 37 PO2 118.  On 2L NC.    - CMP  - Continue to monitor  - D5 with NS gtt

## 2023-11-26 LAB
ALBUMIN SERPL BCP-MCNC: 3.5 G/DL (ref 3.5–5.2)
ALP SERPL-CCNC: 73 U/L (ref 55–135)
ALT SERPL W/O P-5'-P-CCNC: 35 U/L (ref 10–44)
ANION GAP SERPL CALC-SCNC: 14 MMOL/L (ref 8–16)
AST SERPL-CCNC: 46 U/L (ref 10–40)
BASOPHILS # BLD AUTO: 0.01 K/UL (ref 0–0.2)
BASOPHILS NFR BLD: 0.1 % (ref 0–1.9)
BILIRUB SERPL-MCNC: 0.4 MG/DL (ref 0.1–1)
BUN SERPL-MCNC: 5 MG/DL (ref 8–23)
CALCIUM SERPL-MCNC: 8.6 MG/DL (ref 8.7–10.5)
CHLORIDE SERPL-SCNC: 99 MMOL/L (ref 95–110)
CO2 SERPL-SCNC: 27 MMOL/L (ref 23–29)
CREAT SERPL-MCNC: 0.8 MG/DL (ref 0.5–1.4)
DIFFERENTIAL METHOD: ABNORMAL
EOSINOPHIL # BLD AUTO: 0.1 K/UL (ref 0–0.5)
EOSINOPHIL NFR BLD: 0.7 % (ref 0–8)
ERYTHROCYTE [DISTWIDTH] IN BLOOD BY AUTOMATED COUNT: 17.6 % (ref 11.5–14.5)
EST. GFR  (NO RACE VARIABLE): >60 ML/MIN/1.73 M^2
GLUCOSE SERPL-MCNC: 198 MG/DL (ref 70–110)
HCT VFR BLD AUTO: 31.7 % (ref 37–48.5)
HGB BLD-MCNC: 10 G/DL (ref 12–16)
IMM GRANULOCYTES # BLD AUTO: 0.05 K/UL (ref 0–0.04)
IMM GRANULOCYTES NFR BLD AUTO: 0.7 % (ref 0–0.5)
LYMPHOCYTES # BLD AUTO: 4.5 K/UL (ref 1–4.8)
LYMPHOCYTES NFR BLD: 67.6 % (ref 18–48)
MAGNESIUM SERPL-MCNC: 1.4 MG/DL (ref 1.6–2.6)
MCH RBC QN AUTO: 26.5 PG (ref 27–31)
MCHC RBC AUTO-ENTMCNC: 31.5 G/DL (ref 32–36)
MCV RBC AUTO: 84 FL (ref 82–98)
MONOCYTES # BLD AUTO: 0.3 K/UL (ref 0.3–1)
MONOCYTES NFR BLD: 4.8 % (ref 4–15)
NEUTROPHILS # BLD AUTO: 1.8 K/UL (ref 1.8–7.7)
NEUTROPHILS NFR BLD: 26.1 % (ref 38–73)
NRBC BLD-RTO: 0 /100 WBC
PHOSPHATE SERPL-MCNC: 1.5 MG/DL (ref 2.7–4.5)
PLATELET # BLD AUTO: 68 K/UL (ref 150–450)
PMV BLD AUTO: 10.8 FL (ref 9.2–12.9)
POCT GLUCOSE: 153 MG/DL (ref 70–110)
POCT GLUCOSE: 156 MG/DL (ref 70–110)
POCT GLUCOSE: 213 MG/DL (ref 70–110)
POCT GLUCOSE: 239 MG/DL (ref 70–110)
POTASSIUM SERPL-SCNC: 3.5 MMOL/L (ref 3.5–5.1)
PROT SERPL-MCNC: 6.1 G/DL (ref 6–8.4)
RBC # BLD AUTO: 3.77 M/UL (ref 4–5.4)
SODIUM SERPL-SCNC: 140 MMOL/L (ref 136–145)
WBC # BLD AUTO: 6.72 K/UL (ref 3.9–12.7)

## 2023-11-26 PROCEDURE — 25000003 PHARM REV CODE 250: Performed by: HOSPITALIST

## 2023-11-26 PROCEDURE — 80053 COMPREHEN METABOLIC PANEL: CPT

## 2023-11-26 PROCEDURE — 25000003 PHARM REV CODE 250

## 2023-11-26 PROCEDURE — 63600175 PHARM REV CODE 636 W HCPCS

## 2023-11-26 PROCEDURE — 11000001 HC ACUTE MED/SURG PRIVATE ROOM

## 2023-11-26 PROCEDURE — 25000003 PHARM REV CODE 250: Performed by: EMERGENCY MEDICINE

## 2023-11-26 PROCEDURE — 99233 PR SUBSEQUENT HOSPITAL CARE,LEVL III: ICD-10-PCS | Mod: ,,, | Performed by: PSYCHIATRY & NEUROLOGY

## 2023-11-26 PROCEDURE — 85025 COMPLETE CBC W/AUTO DIFF WBC: CPT

## 2023-11-26 PROCEDURE — 99233 SBSQ HOSP IP/OBS HIGH 50: CPT | Mod: ,,, | Performed by: PSYCHIATRY & NEUROLOGY

## 2023-11-26 PROCEDURE — 94640 AIRWAY INHALATION TREATMENT: CPT

## 2023-11-26 PROCEDURE — 36415 COLL VENOUS BLD VENIPUNCTURE: CPT

## 2023-11-26 PROCEDURE — S4991 NICOTINE PATCH NONLEGEND: HCPCS | Performed by: EMERGENCY MEDICINE

## 2023-11-26 PROCEDURE — 84100 ASSAY OF PHOSPHORUS: CPT

## 2023-11-26 PROCEDURE — 83735 ASSAY OF MAGNESIUM: CPT

## 2023-11-26 RX ORDER — SODIUM,POTASSIUM PHOSPHATES 280-250MG
2 POWDER IN PACKET (EA) ORAL
Status: DISCONTINUED | OUTPATIENT
Start: 2023-11-26 | End: 2023-11-26

## 2023-11-26 RX ORDER — POTASSIUM CHLORIDE 20 MEQ/1
40 TABLET, EXTENDED RELEASE ORAL
Status: DISCONTINUED | OUTPATIENT
Start: 2023-11-26 | End: 2023-11-26

## 2023-11-26 RX ORDER — SODIUM,POTASSIUM PHOSPHATES 280-250MG
2 POWDER IN PACKET (EA) ORAL
Status: DISPENSED | OUTPATIENT
Start: 2023-11-26 | End: 2023-11-26

## 2023-11-26 RX ORDER — DICYCLOMINE HYDROCHLORIDE 10 MG/1
10 CAPSULE ORAL 2 TIMES DAILY
Status: DISCONTINUED | OUTPATIENT
Start: 2023-11-26 | End: 2023-11-29 | Stop reason: HOSPADM

## 2023-11-26 RX ORDER — MAGNESIUM SULFATE HEPTAHYDRATE 40 MG/ML
2 INJECTION, SOLUTION INTRAVENOUS ONCE
Status: COMPLETED | OUTPATIENT
Start: 2023-11-26 | End: 2023-11-26

## 2023-11-26 RX ADMIN — FOLIC ACID 1 MG: 1 TABLET ORAL at 08:11

## 2023-11-26 RX ADMIN — DICYCLOMINE HYDROCHLORIDE 10 MG: 10 CAPSULE ORAL at 08:11

## 2023-11-26 RX ADMIN — HYDROCHLOROTHIAZIDE 25 MG: 25 TABLET ORAL at 08:11

## 2023-11-26 RX ADMIN — TRAZODONE HYDROCHLORIDE 200 MG: 100 TABLET ORAL at 09:11

## 2023-11-26 RX ADMIN — ENOXAPARIN SODIUM 40 MG: 40 INJECTION SUBCUTANEOUS at 05:11

## 2023-11-26 RX ADMIN — ACETAMINOPHEN 650 MG: 325 TABLET ORAL at 09:11

## 2023-11-26 RX ADMIN — ESCITALOPRAM OXALATE 10 MG: 10 TABLET ORAL at 08:11

## 2023-11-26 RX ADMIN — INSULIN ASPART 1 UNITS: 100 INJECTION, SOLUTION INTRAVENOUS; SUBCUTANEOUS at 09:11

## 2023-11-26 RX ADMIN — POTASSIUM PHOSPHATE, MONOBASIC POTASSIUM PHOSPHATE, DIBASIC 30 MMOL: 224; 236 INJECTION, SOLUTION, CONCENTRATE INTRAVENOUS at 11:11

## 2023-11-26 RX ADMIN — ONDANSETRON 4 MG: 4 TABLET ORAL at 11:11

## 2023-11-26 RX ADMIN — DIAZEPAM 10 MG: 5 TABLET ORAL at 05:11

## 2023-11-26 RX ADMIN — PANTOPRAZOLE SODIUM 40 MG: 40 TABLET, DELAYED RELEASE ORAL at 08:11

## 2023-11-26 RX ADMIN — NICOTINE 1 PATCH: 14 PATCH, EXTENDED RELEASE TRANSDERMAL at 08:11

## 2023-11-26 RX ADMIN — LEVOTHYROXINE SODIUM 75 MCG: 75 TABLET ORAL at 06:11

## 2023-11-26 RX ADMIN — LOSARTAN POTASSIUM 100 MG: 50 TABLET, FILM COATED ORAL at 08:11

## 2023-11-26 RX ADMIN — DIAZEPAM 10 MG: 5 TABLET ORAL at 11:11

## 2023-11-26 RX ADMIN — MAGNESIUM SULFATE HEPTAHYDRATE 2 G: 40 INJECTION, SOLUTION INTRAVENOUS at 08:11

## 2023-11-26 RX ADMIN — POTASSIUM & SODIUM PHOSPHATES POWDER PACK 280-160-250 MG 2 PACKET: 280-160-250 PACK at 08:11

## 2023-11-26 RX ADMIN — GABAPENTIN 600 MG: 300 CAPSULE ORAL at 05:11

## 2023-11-26 RX ADMIN — Medication 100 MG: at 08:11

## 2023-11-26 RX ADMIN — GABAPENTIN 600 MG: 300 CAPSULE ORAL at 08:11

## 2023-11-26 RX ADMIN — GABAPENTIN 600 MG: 300 CAPSULE ORAL at 09:11

## 2023-11-26 RX ADMIN — POTASSIUM CHLORIDE 40 MEQ: 1500 TABLET, EXTENDED RELEASE ORAL at 08:11

## 2023-11-26 RX ADMIN — DICYCLOMINE HYDROCHLORIDE 10 MG: 10 CAPSULE ORAL at 09:11

## 2023-11-26 RX ADMIN — DIAZEPAM 10 MG: 5 TABLET ORAL at 06:11

## 2023-11-26 RX ADMIN — INSULIN ASPART 2 UNITS: 100 INJECTION, SOLUTION INTRAVENOUS; SUBCUTANEOUS at 11:11

## 2023-11-26 RX ADMIN — THERA TABS 1 TABLET: TAB at 08:11

## 2023-11-26 RX ADMIN — FLUTICASONE FUROATE AND VILANTEROL TRIFENATATE 1 PUFF: 100; 25 POWDER RESPIRATORY (INHALATION) at 10:11

## 2023-11-26 RX ADMIN — DIAZEPAM 10 MG: 5 TABLET ORAL at 12:11

## 2023-11-26 NOTE — ASSESSMENT & PLAN NOTE
Patient was found to have thrombocytopenia, the likely etiology is secondary to alcohol use disorder, less likely but possible CLL contributory. Will monitor the platelets Daily. Will transfuse if platelet count is <10k. Hold DVT prophylaxis if platelets are <50k. The patient's platelet results have been reviewed and are listed below.  Recent Labs   Lab 11/26/23  0404   PLT 68*

## 2023-11-26 NOTE — PROGRESS NOTES
[facility] Medicine  Progress Note    Patient Name: Earl Abdul  MRN: [id number]  Patient Class: IP- Inpatient   Admission Date: 2023  Length of Stay: 2 days  Attending Physician: Kirk Goetz MD  Primary Care Provider: Andrew Rodriguez MD    Subjective:     Principal Problem:Suicide attempt by cutting of wrist    HPI:  Earl Abdul is a 63-year-old female with a medical history of depression, past suicide ideation and attempt, alcohol use disorder, alcohol withdrawl seizures, CLL not on treatment, DM2, COPD, HTN, and HLD who presented to [facility] ED on 23 after self-harm and found to be acutely intoxicated by alcohol.  She tells me that she has been very depressed lately due to missing her son who is .  She made superficial incisions to both wrists two days prior to admission.  She also stated that she had been drinking a lot of vodka.  States she is in pain all over her body and is worried about withdrawing from alcohol.  Denies chest pain, palpitations, abdominal pain, nausea, emesis, melena, and calf pain.  Denies audio and visual hallucinations and agitation.  Alert and oriented to person, place, and time during my examination.  Numerous prior admissions for similar episodes most recently  -  requiring a Valium taper.  Has been to residential rehab several times, attended AA meetings, completed IOP in the past.     In the ED, /55  RR 20 and afebrile with sats 93-96% on 2L.  Labs notable for WBC 34 Hgb 11.7 Plt 105 K 5.3 Bicarb 18 Gap 24 Glucose 59 AST 79 BHB 5.2 EtOH 279.  VBG showed pH 7.388 PCO2 37 PO2 118.  CXR showed no acute findings.  EKG showed sinus tachycardia with a rate 108bpm and QTc 436ms.  Received Duonebs, thiamine, folic acid, and nicotine patch.  She was placed under a PEC.    The patient was admitted to Hospital Medicine for further workup and management.    Overview/Hospital Course:  PEC on 23.  Patient tremulous  during withdrawal period, on Valium q6h and CIWA precautions. Denies hallucinations or seizures, however patient's  states she had seizures 6 or 7 years ago. Of note, patient's  states that he is in the process of . He recently moved out and believes that was the inciting event of her SA. Patient denies history of SI or SA. Psychiatry has been consulted for recent SA, appreciate recs. CEC expires 12/8/23 at 8 PM.     Interval History: Required Ativan yesterday for CIWA > 8. Continuing Valium today with taper tomorrow. Patient wants to go home. Explained CEC process to patient who is upset but voiced understanding.    Review of Systems   Constitutional:  Positive for activity change. Negative for chills and fever.   HENT:  Negative for sore throat.    Respiratory:  Negative for shortness of breath and wheezing.    Cardiovascular:  Negative for chest pain and palpitations.   Gastrointestinal:  Negative for abdominal distention, abdominal pain, constipation, diarrhea, nausea and vomiting.   Genitourinary:  Negative for dysuria and flank pain.   Neurological:  Negative for tremors and seizures.   Psychiatric/Behavioral:  Negative for agitation, confusion and hallucinations. The patient is nervous/anxious.      Objective:     Vital Signs (Most Recent):  Temp: 97.8 °F (36.6 °C) (11/26/23 1225)  Pulse: 97 (11/26/23 1225)  Resp: 18 (11/26/23 1225)  BP: (!) 143/65 (11/26/23 1225)  SpO2: (!) 94 % (11/26/23 1225) Vital Signs (24h Range):  Temp:  [97.2 °F (36.2 °C)-98.7 °F (37.1 °C)] 97.8 °F (36.6 °C)  Pulse:  [] 97  Resp:  [18] 18  SpO2:  [94 %-99 %] 94 %  BP: (115-143)/(56-74) 143/65     Weight: 86.3 kg (190 lb 4.1 oz)  Body mass index is 30.71 kg/m².    Intake/Output Summary (Last 24 hours) at 11/26/2023 1246  Last data filed at 11/25/2023 1358  Gross per 24 hour   Intake --   Output 200 ml   Net -200 ml         Physical Exam  Constitutional:       General: She is not in acute distress.      Appearance: Normal appearance.   HENT:      Head: Normocephalic and atraumatic.      Nose: Nose normal.      Mouth/Throat:      Mouth: Mucous membranes are moist.      Pharynx: Oropharynx is clear.   Eyes:      Extraocular Movements: Extraocular movements intact.      Conjunctiva/sclera: Conjunctivae normal.   Cardiovascular:      Rate and Rhythm: Normal rate and regular rhythm.   Pulmonary:      Effort: Pulmonary effort is normal.      Breath sounds: Normal breath sounds.   Abdominal:      General: Abdomen is flat.      Palpations: Abdomen is soft.   Musculoskeletal:      Right lower leg: No edema.      Left lower leg: No edema.   Skin:     General: Skin is warm and dry.   Neurological:      Mental Status: She is alert and oriented to person, place, and time. Mental status is at baseline.      Comments: Lower extremity tremors noted   Psychiatric:         Thought Content: Thought content normal.      Comments: Reserved mood. Decreased insight. Denies recent self-harm.            Significant Labs: All pertinent labs within the past 24 hours have been reviewed.    Significant Imaging: I have reviewed all pertinent imaging results/findings within the past 24 hours.    Assessment/Plan:      * Suicide attempt by cutting of wrist  Presented due to cutting bilateral wrists.  Wounds are superficial and she is hemodynamically stable.  States she has been missing her son who is  and has been drinking in excess.  EtOH level 279 on admit.  Reports that she follows with an outside Psychiatrist and has seen Dr. Cortes in the past. Abilify prescribed but patient not taking at home. PEC instituted in the ED. CEC expires 23.    - Psychiatry consulted, appreciate recs    SIRS (systemic inflammatory response syndrome)  WBC 34 > 16 > 7 during hospitalization.   Intermittent tachycardia and hypertension.    Suspect 2/2 alcohol withdrawal rather than infection.    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   Passed voiding trial.    Hyperkalemia  K 5.3 on admit.  EKG without T wave changes. Lokelma in ED.    - Daily CMP  - Shift or replete PRN    GERD (gastroesophageal reflux disease)  Chronic and stable    - Continue home pantoprazole      CLL (chronic lymphocytic leukemia)  Chronic and stable.  Not currently on therapy.  Followed by Dr. Montano.    - CBC      Alcoholic ketoacidosis  Reports drinking vodka with last drink day of admit.  EtOH 279.  BHB 5.2.  Bicarb 18 Gap 24.  pH 7.388 PCO2 37 PO2 118.  On 2L NC.    - CMP  - Continue to monitor    Type 2 diabetes mellitus with hypoglycemia  A1c 5.5 on 11/4/23.  Takes Metformin at home.  Glucose 59 on admit.    - LDSSI and POCT 4 times daily  - Hypoglycemia precautions  - Diabetic diet    COPD (chronic obstructive pulmonary disease)  Does not appear to be in acute respiratory distress but is requiring 2L.  CXR unremarkable.    - Continue home inhaler  - Supplemental O2 goal sats 88-92%, wean as tolerated  - [place] on smoking cessation    Malignant neoplasm of central portion of right breast in female, estrogen receptor positive  Last clinic visit 6/30/22  Letrozole started in February 2021.  Transitioned to anastrozole but patient not taking.    - F/u outpatient    Thrombocytopenia  Patient was found to have thrombocytopenia, the likely etiology is secondary to alcohol use disorder, less likely but possible CLL contributory. Will monitor the platelets Daily. Will transfuse if platelet count is <10k. Hold DVT prophylaxis if platelets are <50k. The patient's platelet results have been reviewed and are listed below.  Recent Labs   Lab 11/26/23  0404   PLT 68*           Hyperlipidemia  Chronic and stable. Statin prescribed but patient not taking.     - F/u outpatient      Depression with anxiety  See Suicide attempt by cutting of wrist.    - Continue home escitalopram        Tobacco abuse  Chronic.  COPD and cancer risk factor.    - Nicotine patch  -  Encourage cessation      Essential hypertension  /55 on admit.  Concerned that withdrawal will exacerbate.  Home meds include clonidine, metoprolol, losartan, and HCTZ but patient not taking.    - F/u outpatient    Alcohol use disorder, severe, dependence  Reports drinking vodka with last drink day of admit.  EtOH 279.  High risk for withdrawal seizures as she has experienced these in the past.  Numerous prior admissions for similar episodes most recently 11/4 - 11/6 requiring a Valium taper.  Has been to residential rehab several times, attended AA meetings, completed IOP in the past.     Per patient's , patient has not been compliant with home valium tapers. Will taper while under CEC. Patient resistant to inpatient rehabilitation center but has decreased social support.     - Valium 10mg q6h  - Ativan 2mg q4h PRN for CIWA > 8  - CIWA checks  - Thiamine, folate, multivitamin supplementation  - Fall precautions  - Seizure precautions  - [place] on cessation  - Psych consulted, appreciate recs      VTE Risk Mitigation (From admission, onward)           Ordered     enoxaparin injection 40 mg  Daily         11/23/23 2139     IP VTE HIGH RISK PATIENT  Once         11/23/23 2139     Place sequential compression device  Until discontinued         11/23/23 2119                    Discharge Planning   BECCA: 11/29/2023     Code Status: Full Code   Is the patient medically ready for discharge?: No    Reason for patient still in hospital (select all that apply): Patient unstable             Tahira Canela MD  Department of Hospital Medicine   [facility] - Med Surg

## 2023-11-26 NOTE — SUBJECTIVE & OBJECTIVE
Interval History: Required Ativan yesterday for CIWA > 8. Continuing Valium today with taper tomorrow. Patient wants to go home. Explained CEC process to patient who is upset but voiced understanding.    Review of Systems   Constitutional:  Positive for activity change. Negative for chills and fever.   HENT:  Negative for sore throat.    Respiratory:  Negative for shortness of breath and wheezing.    Cardiovascular:  Negative for chest pain and palpitations.   Gastrointestinal:  Negative for abdominal distention, abdominal pain, constipation, diarrhea, nausea and vomiting.   Genitourinary:  Negative for dysuria and flank pain.   Neurological:  Negative for tremors and seizures.   Psychiatric/Behavioral:  Negative for agitation, confusion and hallucinations. The patient is nervous/anxious.      Objective:     Vital Signs (Most Recent):  Temp: 97.8 °F (36.6 °C) (11/26/23 1225)  Pulse: 97 (11/26/23 1225)  Resp: 18 (11/26/23 1225)  BP: (!) 143/65 (11/26/23 1225)  SpO2: (!) 94 % (11/26/23 1225) Vital Signs (24h Range):  Temp:  [97.2 °F (36.2 °C)-98.7 °F (37.1 °C)] 97.8 °F (36.6 °C)  Pulse:  [] 97  Resp:  [18] 18  SpO2:  [94 %-99 %] 94 %  BP: (115-143)/(56-74) 143/65     Weight: 86.3 kg (190 lb 4.1 oz)  Body mass index is 30.71 kg/m².    Intake/Output Summary (Last 24 hours) at 11/26/2023 1246  Last data filed at 11/25/2023 1358  Gross per 24 hour   Intake --   Output 200 ml   Net -200 ml         Physical Exam  Constitutional:       General: She is not in acute distress.     Appearance: Normal appearance.   HENT:      Head: Normocephalic and atraumatic.      Nose: Nose normal.      Mouth/Throat:      Mouth: Mucous membranes are moist.      Pharynx: Oropharynx is clear.   Eyes:      Extraocular Movements: Extraocular movements intact.      Conjunctiva/sclera: Conjunctivae normal.   Cardiovascular:      Rate and Rhythm: Normal rate and regular rhythm.   Pulmonary:      Effort: Pulmonary effort is normal.      Breath  sounds: Normal breath sounds.   Abdominal:      General: Abdomen is flat.      Palpations: Abdomen is soft.   Musculoskeletal:      Right lower leg: No edema.      Left lower leg: No edema.   Skin:     General: Skin is warm and dry.   Neurological:      Mental Status: She is alert and oriented to person, place, and time. Mental status is at baseline.      Comments: Lower extremity tremors noted   Psychiatric:         Thought Content: Thought content normal.      Comments: Reserved mood. Decreased insight. Denies recent self-harm.            Significant Labs: All pertinent labs within the past 24 hours have been reviewed.    Significant Imaging: I have reviewed all pertinent imaging results/findings within the past 24 hours.

## 2023-11-26 NOTE — PROGRESS NOTES
"CONSULTATION LIAISON PSYCHIATRY PROGRESS NOTE    Patient Name: Earl Abdul  MRN: [id number]  Patient Class: IP- Inpatient  Admission Date: 11/23/2023  Attending Physician: Kirk Goetz MD      SUBJECTIVE:   Earl Abdul is a 63 y.o. female with past psychiatric history of depression with SA in 2017, alcohol use disorder complicated by seizures, tobacco use disorder & past pertinent medical history of sarcoidosis, COPD, CLL, T2DM, HTN, HLD, fibromyalgia presents to the ED/admitted to the hospital for Suicide attempt by cutting of wrist and alcoholic ketoacidosis.     Psychiatry consulted for "PEC for SA; alcohol use disorder"    Interval events: no acute events overnight. Resumed home meds - lexapro 10mg, trazodone 150mg. Started on valium taper. Did not require PRN ativan for withdrawal symptoms. No behavioral PRNs required. Adherent to scheduled meds.     Pt seen in room on interview. A&Ox3. Pt was tearful, cooperative throughout interview; Pt expressed not wanting to escalate to a higher level of rehab care due to "having a different frame of mind." Pt expressing that she will use a breathalyzer twice a day while continuing ABU IOP here; pt added that she previously undergone 30-60 day residential programs ([facility] outside [place], [place]; cannot recall other residential rehab programs in other states) have not worked for her. Pt affirmed that this was previously done however expresses that her new from of mind will be preventative. Denied SI/HI/AVH. Pt stated having significant improvements w/ w/d s/s.    OBJECTIVE:    Mental Status Exam:  General Appearance: adequately groomed, dressed in hospital garb, lying in bed, appears older than stated age  Behavior: normal, cooperative, polite, appropriate eye-contact, under good behavioral control  Involuntary Movements and Motor Activity: no tics, no tremors, no akathisia, no dystonia, no evidence of tardive dyskinesia, tremor of bilateral upper " "extremities noted at rest  Gait and Station: unable to assess - patient lying down or seated  Speech and Language: normal rate, rhythm, volume, tone, and pitch  Mood: "okay"  Affect: constricted, irritable  Thought Process and Associations: linear, organized  Thought Content and Perceptions::  denies SI/HI/AVH  Sensorium and Orientation: grossly intact  Recent and Remote Memory: grossly intact  Attention and Concentration: grossly intact  Fund of Knowledge: grossly intact  Insight: limited/partial awareness of illness  Judgment: impaired due to limited insight into severity of alcohol use disorder    CAM ICU positive? no    ASSESSMENT & RECOMMENDATIONS   Alcohol Use Disorder, Severe, w/ hx of complicated withdrawal seizures  Unspecified Depressive Disorder c/b SA v SI w/ SIB via cutting  R/o SIMD  R/o Prolonged Grief Disorder  R/o Adjustment disorder w/ depressed mood    PSYCH MEDICATIONS  Scheduled:   Lexapro 10mg daily   Trazodone to 200mg nightly   11/23/23 EKG tachy 108bpm w/ QTc 436ms    Alcohol detox management:  Last drink 11/23/23; protocol below for 5-7 days after aforementioned date:  CIWA w/ VS check q4hrs while awake  Valium 10mg q6h today  If no w/d PRN after today, can reduce to Valium 10mg q8hrs tomorrow  PRN Ativan 2mg q6hrs for CIWA>8 or 2 of 3: SBP>160, DBP>100, HR>110. Please correlate with pt's concurrent underlying medical hx and clinical setting that may mimic elevated VS.  Daily Thiamine, Folate, and Multivitamins  Recommend higher level of substance abuse rehab beyond IOP; e.g. inpatient/residential rehab. Resources provided in discharge instructions.  Pt declining inpatient/residential rehab.  Pt amenable to continue IOP rehab.  Pt still enrolled in ABU IOP here and still able to return, though not recommended due to recent hx of poor participation.    RISK ASSESSMENT  Please continue PEC/CEC(PEC signed 11/23/23 @2102hrs) because patient is in imminent danger of hurting self and is gravely " disabled anymore.   expressed concerns she will leave AMA w/o it, go home, and relapse into heavy drinking (when pt gets suicidal)    FOLLOW UP  Will follow up while in house    DISPOSITION - once medically cleared:  Patient may be discharged home with next of kin with outpatient psychaitric follow up/rehab.     Please contact ON CALL psychiatry service (24/7) for any acute issues that may arise.    Dr. Mervin Osorio MD  [facility] Psych[facility] PGY-II  CL Psychiatry  [facility]  11/26/2023 2:19 PM    --------------------------------------------------------------------------------------------------------------------------------------------------------------------------------------------------------------------------------------    CONTINUED OBJECTIVE clinical data & findings reviewed and noted for above decision making    Current Medications:   Scheduled Meds:    diazePAM  10 mg Oral Q6H    dicyclomine  10 mg Oral BID    enoxparin  40 mg Subcutaneous Daily    EScitalopram oxalate  10 mg Oral Daily    fluticasone furoate-vilanteroL  1 puff Inhalation Daily    folic acid  1 mg Oral Daily    gabapentin  600 mg Oral TID    hydroCHLOROthiazide  25 mg Oral Daily    levothyroxine  75 mcg Oral Before breakfast    losartan  100 mg Oral Daily    multivitamin  1 tablet Oral Daily    nicotine  1 patch Transdermal Daily    pantoprazole  40 mg Oral Daily    potassium phosphate IVPB  30 mmol Intravenous Once    thiamine  100 mg Oral Daily    traZODone  200 mg Oral QHS     PRN Meds: acetaminophen, dextrose 10%, dextrose 10%, glucagon (human recombinant), glucose, glucose, insulin aspart U-100, loperamide, lorazepam, melatonin, naloxone, ondansetron, simethicone, sodium chloride 0.9%    Allergies:   Review of patient's allergies indicates:   Allergen Reactions    Lortab [hydrocodone-acetaminophen] Itching    Promethazine Itching and Other (See Comments)       Vitals  Vitals:    11/26/23 1225   BP: (!)  143/65   Pulse: 97   Resp: 18   Temp: 97.8 °F (36.6 °C)       Labs/Imaging/Studies:  Recent Results (from the past 24 hour(s))   POCT glucose    Collection Time: 11/25/23  4:31 PM   Result Value Ref Range    POCT Glucose 179 (H) 70 - 110 mg/dL   POCT glucose    Collection Time: 11/25/23  7:11 PM   Result Value Ref Range    POCT Glucose 161 (H) 70 - 110 mg/dL   Comprehensive Metabolic Panel (CMP)    Collection Time: 11/26/23  4:04 AM   Result Value Ref Range    Sodium 140 136 - 145 mmol/L    Potassium 3.5 3.5 - 5.1 mmol/L    Chloride 99 95 - 110 mmol/L    CO2 27 23 - 29 mmol/L    Glucose 198 (H) 70 - 110 mg/dL    BUN 5 (L) 8 - 23 mg/dL    Creatinine 0.8 0.5 - 1.4 mg/dL    Calcium 8.6 (L) 8.7 - 10.5 mg/dL    Total Protein 6.1 6.0 - 8.4 g/dL    Albumin 3.5 3.5 - 5.2 g/dL    Total Bilirubin 0.4 0.1 - 1.0 mg/dL    Alkaline Phosphatase 73 55 - 135 U/L    AST 46 (H) 10 - 40 U/L    ALT 35 10 - 44 U/L    eGFR >60.0 >60 mL/min/1.73 m^2    Anion Gap 14 8 - 16 mmol/L   CBC with Automated Differential    Collection Time: 11/26/23  4:04 AM   Result Value Ref Range    WBC 6.72 3.90 - 12.70 K/uL    RBC 3.77 (L) 4.00 - 5.40 M/uL    Hemoglobin 10.0 (L) 12.0 - 16.0 g/dL    Hematocrit 31.7 (L) 37.0 - 48.5 %    MCV 84 82 - 98 fL    MCH 26.5 (L) 27.0 - 31.0 pg    MCHC 31.5 (L) 32.0 - 36.0 g/dL    RDW 17.6 (H) 11.5 - 14.5 %    Platelets 68 (L) 150 - 450 K/uL    MPV 10.8 9.2 - 12.9 fL    Immature Granulocytes 0.7 (H) 0.0 - 0.5 %    Gran # (ANC) 1.8 1.8 - 7.7 K/uL    Immature Grans (Abs) 0.05 (H) 0.00 - 0.04 K/uL    Lymph # 4.5 1.0 - 4.8 K/uL    Mono # 0.3 0.3 - 1.0 K/uL    Eos # 0.1 0.0 - 0.5 K/uL    Baso # 0.01 0.00 - 0.20 K/uL    nRBC 0 0 /100 WBC    Gran % 26.1 (L) 38.0 - 73.0 %    Lymph % 67.6 (H) 18.0 - 48.0 %    Mono % 4.8 4.0 - 15.0 %    Eosinophil % 0.7 0.0 - 8.0 %    Basophil % 0.1 0.0 - 1.9 %    Differential Method Automated    Magnesium    Collection Time: 11/26/23  4:04 AM   Result Value Ref Range    Magnesium 1.4 (L) 1.6 - 2.6  mg/dL   Phosphorus    Collection Time: 11/26/23  4:04 AM   Result Value Ref Range    Phosphorus 1.5 (L) 2.7 - 4.5 mg/dL   POCT glucose    Collection Time: 11/26/23  8:39 AM   Result Value Ref Range    POCT Glucose 153 (H) 70 - 110 mg/dL   POCT glucose    Collection Time: 11/26/23 10:55 AM   Result Value Ref Range    POCT Glucose 213 (H) 70 - 110 mg/dL     Imaging Results              X-Ray Chest AP Portable (Final result)  Result time 11/23/23 20:02:18      Final result by Uche Montano MD (11/23/23 20:02:18)                   Impression:      No acute intrathoracic process.      Electronically signed by: Uche Montano MD  Date:    11/23/2023  Time:    20:02               Narrative:    EXAMINATION:  XR CHEST AP PORTABLE    CLINICAL HISTORY:  Hypoxemia    TECHNIQUE:  Single frontal view of the chest was performed.    COMPARISON:  11/14/2023.    FINDINGS:  There are postoperative changes in the right axillary region and the right chest wall.  Monitoring EKG leads are present.    The trachea is unremarkable.  There are calcifications of the aortic knob.  The cardiomediastinal silhouette is within normal limits.  There is no evidence of free air beneath the hemidiaphragms.  There are no pleural effusions.  There is no evidence of a pneumothorax.  There is no evidence of pneumomediastinum.  No airspace opacity is present.  There are degenerative changes in the osseous structures.

## 2023-11-26 NOTE — ASSESSMENT & PLAN NOTE
Does not appear to be in acute respiratory distress but is requiring 2L.  CXR unremarkable.    - Continue home inhaler  - Supplemental O2 goal sats 88-92%, wean as tolerated  - [place] on smoking cessation

## 2023-11-26 NOTE — PLAN OF CARE
Problem: Violence Risk or Actual  Goal: Anger and Impulse Control  Outcome: Ongoing, Progressing  Intervention: Minimize Safety Risk  Flowsheets (Taken 11/26/2023 0009)  Enhanced Safety Measures: bed alarm set     Problem: Adult Inpatient Plan of Care  Goal: Plan of Care Review  Outcome: Ongoing, Progressing  Flowsheets (Taken 11/26/2023 0009)  Plan of Care Reviewed With:   patient   spouse  Goal: Optimal Comfort and Wellbeing  Outcome: Ongoing, Progressing  Intervention: Monitor Pain and Promote Comfort  Flowsheets (Taken 11/26/2023 0009)  Pain Management Interventions: relaxation techniques promoted     Problem: Adult Inpatient Plan of Care  Goal: Optimal Comfort and Wellbeing  Outcome: Ongoing, Progressing  Intervention: Monitor Pain and Promote Comfort  Flowsheets (Taken 11/26/2023 0009)  Pain Management Interventions: relaxation techniques promoted     Problem: Diabetes Comorbidity  Goal: Blood Glucose Level Within Targeted Range  Outcome: Ongoing, Progressing  Intervention: Monitor and Manage Glycemia  Flowsheets (Taken 11/26/2023 0009)  Glycemic Management: blood glucose monitored     Problem: Fall Injury Risk  Goal: Absence of Fall and Fall-Related Injury  Outcome: Ongoing, Progressing  Intervention: Identify and Manage Contributors  Flowsheets (Taken 11/26/2023 0009)  Medication Review/Management:   medications reviewed   dosing adjusted        Received pt, not in nay form of distress, able to ambulate with assistance. No suicidal ideation verbalized. Sitter remained at the bedside.  Pt remained free from injuries within the shift, fall prevention protocol maintained.   Sitter remained at the bedside. All needs attended

## 2023-11-26 NOTE — ASSESSMENT & PLAN NOTE
Reports drinking vodka with last drink day of admit.  EtOH 279.  High risk for withdrawal seizures as she has experienced these in the past.  Numerous prior admissions for similar episodes most recently 11/4 - 11/6 requiring a Valium taper.  Has been to residential rehab several times, attended AA meetings, completed IOP in the past.     Per patient's , patient has not been compliant with home valium tapers. Will taper while under CEC. Patient resistant to inpatient rehabilitation center but has decreased social support.     - Valium 10mg q6h  - Ativan 2mg q4h PRN for CIWA > 8  - CIWA checks  - Thiamine, folate, multivitamin supplementation  - Fall precautions  - Seizure precautions  - [place] on cessation  - Psych consulted, appreciate recs

## 2023-11-26 NOTE — ASSESSMENT & PLAN NOTE
Presented due to cutting bilateral wrists.  Wounds are superficial and she is hemodynamically stable.  States she has been missing her son who is  and has been drinking in excess.  EtOH level 279 on admit.  Reports that she follows with an outside Psychiatrist and has seen Dr. Cortes in the past. Abilify prescribed but patient not taking at home. PEC instituted in the ED. CEC expires 23.    - Psychiatry consulted, appreciate recs

## 2023-11-26 NOTE — PLAN OF CARE
Problem: Adult Inpatient Plan of Care  Goal: Plan of Care Review  Outcome: Ongoing, Progressing  Goal: Patient-Specific Goal (Individualized)  Outcome: Ongoing, Progressing  Goal: Absence of Hospital-Acquired Illness or Injury  Outcome: Ongoing, Progressing  Goal: Optimal Comfort and Wellbeing  Outcome: Ongoing, Progressing  Goal: Readiness for Transition of Care  Outcome: Ongoing, Progressing     Problem: Diabetes Comorbidity  Goal: Blood Glucose Level Within Targeted Range  Outcome: Ongoing, Progressing     Problem: Impaired Wound Healing  Goal: Optimal Wound Healing  Outcome: Ongoing, Progressing     Problem: Infection  Goal: Absence of Infection Signs and Symptoms  Outcome: Ongoing, Progressing     Problem: Pain Acute  Goal: Acceptable Pain Control and Functional Ability  Outcome: Ongoing, Progressing     Problem: Fall Injury Risk  Goal: Absence of Fall and Fall-Related Injury  Outcome: Ongoing, Progressing

## 2023-11-26 NOTE — PLAN OF CARE
SW attempted to meet with patient at bedside to complete discharge planning assessment without success.  2 MDs in patient room.  CM will follow up.       11/26/23 2847   Discharge Assessment   Assessment Type Discharge Planning Assessment

## 2023-11-27 LAB
ALBUMIN SERPL BCP-MCNC: 3.2 G/DL (ref 3.5–5.2)
ALP SERPL-CCNC: 65 U/L (ref 55–135)
ALT SERPL W/O P-5'-P-CCNC: 40 U/L (ref 10–44)
ANION GAP SERPL CALC-SCNC: 10 MMOL/L (ref 8–16)
AST SERPL-CCNC: 45 U/L (ref 10–40)
BASOPHILS # BLD AUTO: 0.02 K/UL (ref 0–0.2)
BASOPHILS NFR BLD: 0.3 % (ref 0–1.9)
BILIRUB SERPL-MCNC: 0.4 MG/DL (ref 0.1–1)
BUN SERPL-MCNC: 9 MG/DL (ref 8–23)
CALCIUM SERPL-MCNC: 9 MG/DL (ref 8.7–10.5)
CHLORIDE SERPL-SCNC: 101 MMOL/L (ref 95–110)
CO2 SERPL-SCNC: 27 MMOL/L (ref 23–29)
CREAT SERPL-MCNC: 1 MG/DL (ref 0.5–1.4)
DIFFERENTIAL METHOD: ABNORMAL
EOSINOPHIL # BLD AUTO: 0.1 K/UL (ref 0–0.5)
EOSINOPHIL NFR BLD: 0.9 % (ref 0–8)
ERYTHROCYTE [DISTWIDTH] IN BLOOD BY AUTOMATED COUNT: 18.5 % (ref 11.5–14.5)
EST. GFR  (NO RACE VARIABLE): >60 ML/MIN/1.73 M^2
GLUCOSE SERPL-MCNC: 231 MG/DL (ref 70–110)
HCT VFR BLD AUTO: 32 % (ref 37–48.5)
HGB BLD-MCNC: 9.8 G/DL (ref 12–16)
IMM GRANULOCYTES # BLD AUTO: 0.08 K/UL (ref 0–0.04)
IMM GRANULOCYTES NFR BLD AUTO: 1.2 % (ref 0–0.5)
LYMPHOCYTES # BLD AUTO: 4.3 K/UL (ref 1–4.8)
LYMPHOCYTES NFR BLD: 66.6 % (ref 18–48)
MAGNESIUM SERPL-MCNC: 1.6 MG/DL (ref 1.6–2.6)
MCH RBC QN AUTO: 26.6 PG (ref 27–31)
MCHC RBC AUTO-ENTMCNC: 30.6 G/DL (ref 32–36)
MCV RBC AUTO: 87 FL (ref 82–98)
MONOCYTES # BLD AUTO: 0.4 K/UL (ref 0.3–1)
MONOCYTES NFR BLD: 6.7 % (ref 4–15)
NEUTROPHILS # BLD AUTO: 1.6 K/UL (ref 1.8–7.7)
NEUTROPHILS NFR BLD: 24.3 % (ref 38–73)
NRBC BLD-RTO: 1 /100 WBC
PHOSPHATE SERPL-MCNC: 3.9 MG/DL (ref 2.7–4.5)
PLATELET # BLD AUTO: 68 K/UL (ref 150–450)
PMV BLD AUTO: 11 FL (ref 9.2–12.9)
POCT GLUCOSE: 227 MG/DL (ref 70–110)
POCT GLUCOSE: 247 MG/DL (ref 70–110)
POCT GLUCOSE: 330 MG/DL (ref 70–110)
POCT GLUCOSE: 354 MG/DL (ref 70–110)
POCT GLUCOSE: 356 MG/DL (ref 70–110)
POTASSIUM SERPL-SCNC: 4.2 MMOL/L (ref 3.5–5.1)
PROT SERPL-MCNC: 5.8 G/DL (ref 6–8.4)
RBC # BLD AUTO: 3.69 M/UL (ref 4–5.4)
SODIUM SERPL-SCNC: 138 MMOL/L (ref 136–145)
WBC # BLD AUTO: 6.43 K/UL (ref 3.9–12.7)

## 2023-11-27 PROCEDURE — 25000003 PHARM REV CODE 250

## 2023-11-27 PROCEDURE — 83735 ASSAY OF MAGNESIUM: CPT

## 2023-11-27 PROCEDURE — 84100 ASSAY OF PHOSPHORUS: CPT

## 2023-11-27 PROCEDURE — 36415 COLL VENOUS BLD VENIPUNCTURE: CPT

## 2023-11-27 PROCEDURE — S4991 NICOTINE PATCH NONLEGEND: HCPCS | Performed by: EMERGENCY MEDICINE

## 2023-11-27 PROCEDURE — 63600175 PHARM REV CODE 636 W HCPCS

## 2023-11-27 PROCEDURE — 99233 SBSQ HOSP IP/OBS HIGH 50: CPT | Mod: ,,,

## 2023-11-27 PROCEDURE — 25000003 PHARM REV CODE 250: Performed by: EMERGENCY MEDICINE

## 2023-11-27 PROCEDURE — 80053 COMPREHEN METABOLIC PANEL: CPT

## 2023-11-27 PROCEDURE — 99233 PR SUBSEQUENT HOSPITAL CARE,LEVL III: ICD-10-PCS | Mod: ,,,

## 2023-11-27 PROCEDURE — 85025 COMPLETE CBC W/AUTO DIFF WBC: CPT

## 2023-11-27 PROCEDURE — 11000001 HC ACUTE MED/SURG PRIVATE ROOM

## 2023-11-27 RX ORDER — DIAZEPAM 5 MG/1
10 TABLET ORAL EVERY 8 HOURS
Status: COMPLETED | OUTPATIENT
Start: 2023-11-27 | End: 2023-11-27

## 2023-11-27 RX ORDER — DIAZEPAM 5 MG/1
10 TABLET ORAL DAILY
Status: COMPLETED | OUTPATIENT
Start: 2023-11-28 | End: 2023-11-28

## 2023-11-27 RX ORDER — TALC
6 POWDER (GRAM) TOPICAL NIGHTLY
Status: DISCONTINUED | OUTPATIENT
Start: 2023-11-27 | End: 2023-11-29 | Stop reason: HOSPADM

## 2023-11-27 RX ORDER — DIAZEPAM 5 MG/1
5 TABLET ORAL DAILY
Status: COMPLETED | OUTPATIENT
Start: 2023-11-29 | End: 2023-11-29

## 2023-11-27 RX ORDER — INSULIN ASPART 100 [IU]/ML
0-5 INJECTION, SOLUTION INTRAVENOUS; SUBCUTANEOUS
Status: DISCONTINUED | OUTPATIENT
Start: 2023-11-27 | End: 2023-11-29

## 2023-11-27 RX ORDER — DIAZEPAM 5 MG/1
5 TABLET ORAL NIGHTLY
Status: COMPLETED | OUTPATIENT
Start: 2023-11-28 | End: 2023-11-28

## 2023-11-27 RX ORDER — BACLOFEN 5 MG/1
5 TABLET ORAL 3 TIMES DAILY
Status: DISCONTINUED | OUTPATIENT
Start: 2023-11-27 | End: 2023-11-29 | Stop reason: HOSPADM

## 2023-11-27 RX ORDER — INSULIN ASPART 100 [IU]/ML
4 INJECTION, SOLUTION INTRAVENOUS; SUBCUTANEOUS
Status: DISCONTINUED | OUTPATIENT
Start: 2023-11-27 | End: 2023-11-28

## 2023-11-27 RX ADMIN — GABAPENTIN 600 MG: 300 CAPSULE ORAL at 08:11

## 2023-11-27 RX ADMIN — ESCITALOPRAM OXALATE 10 MG: 10 TABLET ORAL at 08:11

## 2023-11-27 RX ADMIN — INSULIN ASPART 4 UNITS: 100 INJECTION, SOLUTION INTRAVENOUS; SUBCUTANEOUS at 04:11

## 2023-11-27 RX ADMIN — Medication 6 MG: at 08:11

## 2023-11-27 RX ADMIN — PANTOPRAZOLE SODIUM 40 MG: 40 TABLET, DELAYED RELEASE ORAL at 08:11

## 2023-11-27 RX ADMIN — DICYCLOMINE HYDROCHLORIDE 10 MG: 10 CAPSULE ORAL at 08:11

## 2023-11-27 RX ADMIN — ENOXAPARIN SODIUM 40 MG: 40 INJECTION SUBCUTANEOUS at 04:11

## 2023-11-27 RX ADMIN — TRAZODONE HYDROCHLORIDE 200 MG: 100 TABLET ORAL at 08:11

## 2023-11-27 RX ADMIN — INSULIN ASPART 3 UNITS: 100 INJECTION, SOLUTION INTRAVENOUS; SUBCUTANEOUS at 10:11

## 2023-11-27 RX ADMIN — DIAZEPAM 10 MG: 5 TABLET ORAL at 12:11

## 2023-11-27 RX ADMIN — LEVOTHYROXINE SODIUM 75 MCG: 75 TABLET ORAL at 06:11

## 2023-11-27 RX ADMIN — INSULIN ASPART 4 UNITS: 100 INJECTION, SOLUTION INTRAVENOUS; SUBCUTANEOUS at 11:11

## 2023-11-27 RX ADMIN — Medication 100 MG: at 08:11

## 2023-11-27 RX ADMIN — LOSARTAN POTASSIUM 100 MG: 50 TABLET, FILM COATED ORAL at 08:11

## 2023-11-27 RX ADMIN — LORAZEPAM 2 MG: 2 INJECTION INTRAMUSCULAR; INTRAVENOUS at 04:11

## 2023-11-27 RX ADMIN — DIAZEPAM 10 MG: 5 TABLET ORAL at 09:11

## 2023-11-27 RX ADMIN — DIAZEPAM 10 MG: 5 TABLET ORAL at 06:11

## 2023-11-27 RX ADMIN — THERA TABS 1 TABLET: TAB at 08:11

## 2023-11-27 RX ADMIN — DIAZEPAM 10 MG: 5 TABLET ORAL at 02:11

## 2023-11-27 RX ADMIN — INSULIN DETEMIR 13 UNITS: 100 INJECTION, SOLUTION SUBCUTANEOUS at 08:11

## 2023-11-27 RX ADMIN — BACLOFEN 5 MG: 5 TABLET ORAL at 02:11

## 2023-11-27 RX ADMIN — GABAPENTIN 600 MG: 300 CAPSULE ORAL at 02:11

## 2023-11-27 RX ADMIN — NICOTINE 1 PATCH: 14 PATCH, EXTENDED RELEASE TRANSDERMAL at 08:11

## 2023-11-27 RX ADMIN — BACLOFEN 5 MG: 5 TABLET ORAL at 08:11

## 2023-11-27 RX ADMIN — HYDROCHLOROTHIAZIDE 25 MG: 25 TABLET ORAL at 08:11

## 2023-11-27 RX ADMIN — FLUTICASONE FUROATE AND VILANTEROL TRIFENATATE 1 PUFF: 100; 25 POWDER RESPIRATORY (INHALATION) at 09:11

## 2023-11-27 RX ADMIN — FOLIC ACID 1 MG: 1 TABLET ORAL at 08:11

## 2023-11-27 NOTE — NURSING
Pt remained free from injuries within the shift, no signs of agitation, pt was calm and cooperative. No suicidal ideations verbalized  Fall prevention protocol maintained. Sitter remained at the bedside.  Pt still doesn't have IV access at this time, midline team consult in place, medical team informed and told can wait for the midline team to get an access.

## 2023-11-27 NOTE — PROGRESS NOTES
STAFF NOTE    The chart was reviewed and the case was discussed, including the assessment and management plan.  I agree with the overall findings, with corrections or clarifications, if any, noted herein.    *Patient has been discharged from [facility].  She is currently hospitalized on an involuntary psychiatric status.*    Liborio Patel MD  Board Certification: Psychiatry and Addiction Medicine

## 2023-11-27 NOTE — PLAN OF CARE
Problem: Violence Risk or Actual  Goal: Anger and Impulse Control  Outcome: Ongoing, Progressing     Problem: Adult Inpatient Plan of Care  Goal: Plan of Care Review  Outcome: Ongoing, Progressing  Goal: Patient-Specific Goal (Individualized)  Outcome: Ongoing, Progressing  Goal: Absence of Hospital-Acquired Illness or Injury  Outcome: Ongoing, Progressing  Intervention: Identify and Manage Fall Risk  Flowsheets (Taken 11/27/2023 1123)  Safety Promotion/Fall Prevention:   assistive device/personal item within reach   medications reviewed   side rails raised x 2  Goal: Optimal Comfort and Wellbeing  Outcome: Ongoing, Progressing  Intervention: Monitor Pain and Promote Comfort  Flowsheets (Taken 11/27/2023 1123)  Pain Management Interventions: pain management plan reviewed with patient/caregiver  Goal: Readiness for Transition of Care  Outcome: Ongoing, Progressing

## 2023-11-27 NOTE — PLAN OF CARE
[facility] (Addiction IOP)  Plan of Care      Earl Abdul is a 63 y.o. female with past psychiatric history of tobacco abuse, alcohol abuse with seizures and complicated withdrawal, and depression with SA in 2017 & past pertinent medical history of breast cancer, HTN, HLD, fibromyalgia, sarcoidosis, hypothyroidism, T2DM, CLL (not on treatment) and COPD, who was admitted to [facility] on 11/8/23 for Alcohol use disorder, severe, dependence.   Pt has had several hospital admissions for alcohol withdrawal, and has a hx of complicated withdrawal, including seizures, hallucinations. Has been to residential rehab several times, attended AA meetings, completed IOP in the past.   She had just been [place] [facility] [place] (just prior to starting IOP) on a Valium taper, after presenting to the ED on 11/4/23 with BAL of 211.   She was admitted to [facility] with plan to continue Valium taper prescribed on 11/6/23 hospital discharge. Of note, her most recent psychotropic regimen was reviewed (Lexapro 10mg daily, Abilify 10mg daily, Trazodone 150mg qhs), although per pt, she had not been prescribed/had not been taking Lexapro (this was given to her during 11/4/23-11/6/23 hospitalization).   Pt attended [facility] on 11/8/23 (first day), called in sick on 11/9/23 2/2 GI sx, and attended one more time on 11/13/23. On 11/13/23, pt was focused on chronic back pain which had been flaring up and for which she had been following up with an orthopedic surgeon. She had been prescribed hydrocodone for pain by orthopedics; discussed potential addictive nature of this medication with pt, and reached out to orthopedics office to see if other alternative medications might be appropriate, but did not hear back. In addition, on 11/13/23, pt denied drinking since starting the program on 11/8/23 (stated last drink was on 11/7/23, one day after being discharged from the hospital on 11/6/23). PETH on 11/13/23 was 301.  "  Spoke with pt's  on 11/8/23, who was in agreement with plan; he stated that he and pt were planning on getting [employer] remote alcohol monitoring set up as an additional way to encourage sobriety.     Pt did not attend [facility] since 11/13/23; did not attend any community-based self-help programs (AA, SMART recovery) meetings while in [facility].     Per chart review, pt was hospitalized on 11/23/23 after presenting for "Suicide attempt by cutting of wrist and alcoholic ketoacidosis." She is currently PEC/CEC'd for danger to self, and is being followed by [facility] psychiatry.   Will defer dispo to primary team in collaboration with [facility] psychiatry and pt/family (pt is currently PEC/CEC'd). Per chart review, both pt and  voiced preference for inpatient rehab rather than inpatient psych facility (if appropriate to rescind PEC/CEC on/before discharge).     Pt likely needs a higher level of care than [facility] (IOP) after discharge.       Elli Smith MD  [facility] PGY-IV      "

## 2023-11-27 NOTE — PROGRESS NOTES
[facility]  Progress Note    Patient Name: Earl Abdul  MRN: [id number]  Patient Class: IP- Inpatient   Admission Date: 2023  Length of Stay: 3 days  Attending Physician: Kirk Goetz MD  Primary Care Provider: Andrew Rodriguez MD    Subjective:     Principal Problem:Suicide attempt by cutting of wrist    HPI:  Earl Abdul is a 63-year-old female with a medical history of depression, past suicide ideation and attempt, alcohol use disorder, alcohol withdrawl seizures, CLL not on treatment, DM2, COPD, HTN, and HLD who presented to [facility] ED on 23 after self-harm and found to be acutely intoxicated by alcohol.  She tells me that she has been very depressed lately due to missing her son who is .  She made superficial incisions to both wrists two days prior to admission.  She also stated that she had been drinking a lot of vodka.  States she is in pain all over her body and is worried about withdrawing from alcohol.  Denies chest pain, palpitations, abdominal pain, nausea, emesis, melena, and calf pain.  Denies audio and visual hallucinations and agitation.  Alert and oriented to person, place, and time during my examination.  Numerous prior admissions for similar episodes most recently  -  requiring a Valium taper.  Has been to residential rehab several times, attended AA meetings, completed IOP in the past.     In the ED, /55  RR 20 and afebrile with sats 93-96% on 2L.  Labs notable for WBC 34 Hgb 11.7 Plt 105 K 5.3 Bicarb 18 Gap 24 Glucose 59 AST 79 BHB 5.2 EtOH 279.  VBG showed pH 7.388 PCO2 37 PO2 118.  CXR showed no acute findings.  EKG showed sinus tachycardia with a rate 108bpm and QTc 436ms.  Received Duonebs, thiamine, folic acid, and nicotine patch.  She was placed under a PEC.    The patient was admitted to Hospital Medicine for further workup and management.    Overview/Hospital Course:  PEC on 23.  Patient tremulous  during withdrawal period, started on Valium q6h and CIWA precautions. Denies hallucinations or seizures, however patient's  states she had seizures 6 or 7 years ago. Patient's  also states that patient has not been compliant with home valium tapers in the past. Tapering valium while inpatient. Patient's  Henrique is concerned that if she is not under CEC, she will leave AMA and will have recurring SI and SA while intoxicated. Psychiatry is following, greatly appreciate recommendations.  CEC expires 12/8/23.    Interval History: Discussed CEC status with patient yesterday and today. Patient is aware that she cannot leave AMA under CEC. Both Earl and her  Henrique voiced preference to discharge to inpatient rehabilitation rather than inpatient psych facility. Starting to taper Valium as she has not needed PRN Ativan. Also adding basal and bolus insulin regimen due to increased PO intake and hyperglycemia.    Review of Systems   Constitutional:  Positive for activity change and fatigue. Negative for chills and fever.   HENT:  Negative for sore throat.    Respiratory:  Negative for shortness of breath and wheezing.    Cardiovascular:  Negative for chest pain and palpitations.   Gastrointestinal:  Negative for abdominal distention, abdominal pain, constipation, diarrhea, nausea and vomiting.   Genitourinary:  Negative for dysuria and flank pain.   Neurological:  Negative for tremors and seizures.   Psychiatric/Behavioral:  Negative for agitation, confusion and hallucinations.      Objective:     Vital Signs (Most Recent):  Temp: 96.1 °F (35.6 °C) (11/27/23 0724)  Pulse: 90 (11/27/23 0724)  Resp: 16 (11/27/23 0724)  BP: (!) 105/58 (11/27/23 0724)  SpO2: 95 % (11/27/23 0724) Vital Signs (24h Range):  Temp:  [96.1 °F (35.6 °C)-98.7 °F (37.1 °C)] 96.1 °F (35.6 °C)  Pulse:  [] 90  Resp:  [16-18] 16  SpO2:  [90 %-95 %] 95 %  BP: (105-144)/(51-67) 105/58     Weight: 86.3 kg (190 lb 4.1 oz)  Body mass  index is 30.71 kg/m².  No intake or output data in the 24 hours ending 23 0849      Physical Exam  Constitutional:       General: She is not in acute distress.     Appearance: Normal appearance.   HENT:      Head: Normocephalic and atraumatic.      Nose: Nose normal.      Mouth/Throat:      Mouth: Mucous membranes are moist.      Pharynx: Oropharynx is clear.   Eyes:      Extraocular Movements: Extraocular movements intact.      Conjunctiva/sclera: Conjunctivae normal.   Cardiovascular:      Rate and Rhythm: Normal rate and regular rhythm.   Pulmonary:      Effort: Pulmonary effort is normal.      Breath sounds: Normal breath sounds.   Abdominal:      General: Abdomen is flat.      Palpations: Abdomen is soft.   Musculoskeletal:      Right lower leg: No edema.      Left lower leg: No edema.   Skin:     General: Skin is warm and dry.   Neurological:      Mental Status: She is alert and oriented to person, place, and time. Mental status is at baseline.   Psychiatric:         Thought Content: Thought content normal.      Comments: Drowsy appearance. Insight improved from prior.             Significant Labs: All pertinent labs within the past 24 hours have been reviewed.    Significant Imaging: I have reviewed all pertinent imaging results/findings within the past 24 hours.    Assessment/Plan:      * Suicide attempt by cutting of wrist  Presented due to cutting bilateral wrists.  Wounds are superficial and she is hemodynamically stable.  States she has been missing her son who is  and has been drinking in excess.  EtOH level 279 on admit.  Reports that she follows with an outside Psychiatrist and has seen Dr. Cortes in the past. Abilify prescribed but patient not taking at home. PEC instituted in the ED. CEC expires 23.    - Psychiatry consulted, appreciate recs    SIRS (systemic inflammatory response syndrome)  WBC 34 > 16 > 7 during hospitalization.   Intermittent tachycardia and  hypertension.    Suspect 2/2 alcohol withdrawal rather than infection.    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   Passed voiding trial, finley removed.    Hyperkalemia  K 5.3 on admit.  EKG without T wave changes. Lokelma in ED.    - Daily CMP  - Shift or replete PRN    GERD (gastroesophageal reflux disease)  Chronic and stable    - Continue home pantoprazole      CLL (chronic lymphocytic leukemia)  Chronic and stable.  Not currently on therapy.  Followed by Dr. Montano.    - CBC      Alcoholic ketoacidosis  Reports drinking vodka with last drink day of admit.  EtOH 279.  BHB 5.2.  Bicarb 18 Gap 24.  pH 7.388 PCO2 37 PO2 118.  On 2L NC.    - CMP  - Continue to monitor    Type 2 diabetes mellitus with hypoglycemia  A1c 5.5 on 11/4/23.  Takes Metformin at home.  Glucose 59 on admit.   PO intake has increased during detox period and hyperglycemia noted.    - Starting daily basal with TIDWM bolus  - POCT glucose  - Hypoglycemia precautions  - Diabetic diet    COPD (chronic obstructive pulmonary disease)  Does not appear to be in acute respiratory distress but is requiring 2L.  CXR unremarkable.    - Continue home inhaler  - Supplemental O2 goal sats 88-92%, wean as tolerated  - [place] on smoking cessation    Malignant neoplasm of central portion of right breast in female, estrogen receptor positive  Last clinic visit 6/30/22  Letrozole started in February 2021.  Transitioned to anastrozole but patient not taking.    - F/u outpatient    Thrombocytopenia  Patient was found to have thrombocytopenia, the likely etiology is secondary to alcohol use disorder, less likely but possible CLL contributory. Will monitor the platelets Daily. Will transfuse if platelet count is <10k. Hold DVT prophylaxis if platelets are <50k. The patient's platelet results have been reviewed and are listed below.  Recent Labs   Lab 11/26/23  0404   PLT 68*           Hyperlipidemia  Chronic and stable. Statin prescribed  but patient not taking.     - F/u outpatient      Depression with anxiety  See Suicide attempt by cutting of wrist.    - Continue home escitalopram        Tobacco abuse  Chronic.  COPD and cancer risk factor.    - Nicotine patch  - Encourage cessation      Essential hypertension  /55 on admit.  Concerned that withdrawal will exacerbate.  Home meds include clonidine, metoprolol, losartan, and HCTZ but patient not taking.    - Continue losartan  - F/u outpatient    Alcohol use disorder, severe, dependence  Reports drinking vodka with last drink day of admit.  EtOH 279.  High risk for withdrawal seizures as she has experienced these in the past.  Numerous prior admissions for similar episodes most recently 11/4 - 11/6 requiring a Valium taper.  Has been to residential rehab several times, attended AA meetings, completed IOP in the past.     Per patient's , patient has not been compliant with home valium tapers. Will taper while under CEC. Ongoing discussions with family regarding dispo planning.    - Valium taper  - Ativan 2mg q4h PRN for CIWA > 8  - CIWA checks  - Thiamine, folate, multivitamin supplementation  - Fall precautions  - Seizure precautions  - [place] on cessation  - Psych consulted, appreciate recs      VTE Risk Mitigation (From admission, onward)           Ordered     enoxaparin injection 40 mg  Daily         11/23/23 2139     IP VTE HIGH RISK PATIENT  Once         11/23/23 2139     Place sequential compression device  Until discontinued         11/23/23 2119                  Discharge Planning   BECCA: 11/28/2023     Code Status: Full Code   Is the patient medically ready for discharge?: Yes    Reason for patient still in hospital (select all that apply): Treatment and Pending disposition           Tahira Canela MD  Department of Hospital Medicine   [facility] - Med Surg

## 2023-11-27 NOTE — ASSESSMENT & PLAN NOTE
Reports drinking vodka with last drink day of admit.  EtOH 279.  High risk for withdrawal seizures as she has experienced these in the past.  Numerous prior admissions for similar episodes most recently 11/4 - 11/6 requiring a Valium taper.  Has been to residential rehab several times, attended AA meetings, completed IOP in the past.     Per patient's , patient has not been compliant with home valium tapers. Will taper while under CEC. Ongoing discussions with family regarding dispo planning.    - Valium taper  - Ativan 2mg q4h PRN for CIWA > 8  - CIWA checks  - Thiamine, folate, multivitamin supplementation  - Fall precautions  - Seizure precautions  - [place] on cessation  - Psych consulted, appreciate recs

## 2023-11-27 NOTE — ASSESSMENT & PLAN NOTE
A1c 5.5 on 11/4/23.  Takes Metformin at home.  Glucose 59 on admit.   PO intake has increased during detox period and hyperglycemia noted.    - Starting daily basal with TIDWM bolus  - POCT glucose  - Hypoglycemia precautions  - Diabetic diet

## 2023-11-27 NOTE — ASSESSMENT & PLAN NOTE
Does not appear to be in acute respiratory distress but is requiring 2L.  CXR unremarkable.    - Continue home inhaler  - Supplemental O2 goal sats 88-92%, wean as tolerated  - [place] on smoking cessation

## 2023-11-27 NOTE — SUBJECTIVE & OBJECTIVE
Interval History: Discussed CEC status with patient yesterday and today. Patient is aware that she cannot leave AMA under CEC. Both Earl and her  Henrique voiced preference to discharge to inpatient rehabilitation rather than inpatient psych facility. Starting to taper Valium as she has not needed PRN Ativan. Also adding basal and bolus insulin regimen due to increased PO intake and hyperglycemia.    Review of Systems   Constitutional:  Positive for activity change and fatigue. Negative for chills and fever.   HENT:  Negative for sore throat.    Respiratory:  Negative for shortness of breath and wheezing.    Cardiovascular:  Negative for chest pain and palpitations.   Gastrointestinal:  Negative for abdominal distention, abdominal pain, constipation, diarrhea, nausea and vomiting.   Genitourinary:  Negative for dysuria and flank pain.   Neurological:  Negative for tremors and seizures.   Psychiatric/Behavioral:  Negative for agitation, confusion and hallucinations.      Objective:     Vital Signs (Most Recent):  Temp: 96.1 °F (35.6 °C) (11/27/23 0724)  Pulse: 90 (11/27/23 0724)  Resp: 16 (11/27/23 0724)  BP: (!) 105/58 (11/27/23 0724)  SpO2: 95 % (11/27/23 0724) Vital Signs (24h Range):  Temp:  [96.1 °F (35.6 °C)-98.7 °F (37.1 °C)] 96.1 °F (35.6 °C)  Pulse:  [] 90  Resp:  [16-18] 16  SpO2:  [90 %-95 %] 95 %  BP: (105-144)/(51-67) 105/58     Weight: 86.3 kg (190 lb 4.1 oz)  Body mass index is 30.71 kg/m².  No intake or output data in the 24 hours ending 11/27/23 0849      Physical Exam  Constitutional:       General: She is not in acute distress.     Appearance: Normal appearance.   HENT:      Head: Normocephalic and atraumatic.      Nose: Nose normal.      Mouth/Throat:      Mouth: Mucous membranes are moist.      Pharynx: Oropharynx is clear.   Eyes:      Extraocular Movements: Extraocular movements intact.      Conjunctiva/sclera: Conjunctivae normal.   Cardiovascular:      Rate and Rhythm: Normal rate  and regular rhythm.   Pulmonary:      Effort: Pulmonary effort is normal.      Breath sounds: Normal breath sounds.   Abdominal:      General: Abdomen is flat.      Palpations: Abdomen is soft.   Musculoskeletal:      Right lower leg: No edema.      Left lower leg: No edema.   Skin:     General: Skin is warm and dry.   Neurological:      Mental Status: She is alert and oriented to person, place, and time. Mental status is at baseline.   Psychiatric:         Thought Content: Thought content normal.      Comments: Drowsy appearance. Insight improved from prior.             Significant Labs: All pertinent labs within the past 24 hours have been reviewed.    Significant Imaging: I have reviewed all pertinent imaging results/findings within the past 24 hours.

## 2023-11-27 NOTE — PROGRESS NOTES
"CONSULTATION LIAISON PSYCHIATRY PROGRESS NOTE    Patient Name: Earl Abdul  MRN: [id number]  Patient Class: IP- Inpatient  Admission Date: 11/23/2023  Attending Physician: Kirk Goetz MD      SUBJECTIVE:   Earl Abdul is a 63 y.o. female with past psychiatric history of depression with SA in 2017, alcohol use disorder complicated by seizures, tobacco use disorder & past pertinent medical history of sarcoidosis, COPD, CLL, T2DM, HTN, HLD, fibromyalgia presents to the ED/admitted to the hospital for Suicide attempt by cutting of wrist and alcoholic ketoacidosis.     Psychiatry consulted for "PEC for SA; alcohol use disorder"    Patient see and chart reviewed. Alert and oriented x4. Calm and cooperative during interview.     Pt sitting up in bed with sitter and  at bedside. Eating breakfast. States she is feeling "great" today and is eager for discharge. Reports she will be attending a 30 day inpatient rehab in [place] once discharged.  confirms this plan. Denies withdrawal symptoms at this time. Only slight hand tremor noted. Per  and pt she is doing much better than when admitted. Denies SI stating "I never really felt that way. I just wanted to feel something. So I cut myself." Scab to right rist noted.       OBJECTIVE:    Mental Status Exam:  General Appearance: adequately groomed, dressed in hospital garb, lying in bed, appears older than stated age  Behavior: normal, cooperative, polite, appropriate eye-contact, under good behavioral control  Involuntary Movements and Motor Activity: no tics, no tremors, no akathisia, no dystonia, no evidence of tardive dyskinesia, tremor of bilateral upper extremities noted at rest  Gait and Station: unable to assess - patient lying down or seated  Speech and Language: normal rate, rhythm, volume, tone, and pitch  Mood: "good"  Affect: normal, euthymic, reactive, full-range, mood-congruent, appropriate to situation and context  Thought " Process and Associations: linear, organized  Thought Content and Perceptions::  denies SI/HI/AVH  Sensorium and Orientation: grossly intact  Recent and Remote Memory: grossly intact  Attention and Concentration: grossly intact  Fund of Knowledge: grossly intact  Insight: intact, demonstrates awareness of illness and situation  Judgment: impaired due to limited insight into severity of alcohol use disorder    CAM ICU positive? no    ASSESSMENT & RECOMMENDATIONS   Alcohol Use Disorder, Severe, w/ hx of complicated withdrawal seizures  Unspecified Depressive Disorder c/b SA v SI w/ SIB via cutting  R/o SIMD  R/o Prolonged Grief Disorder  R/o Adjustment disorder w/ depressed mood    PSYCH MEDICATIONS  Scheduled:   Lexapro 10mg daily   Trazodone to 200mg nightly   11/23/23 EKG tachy 108bpm w/ QTc 436ms    Alcohol detox management:  Last drink 11/23/23; protocol below for 5-7 days after aforementioned date:  CIWA w/ VS check q4hrs while awake  Continue Valium taper; rec decreasing to 10mg TID today, BID tomorrow  PRN Ativan 2mg q6hrs for CIWA>8 or 2 of 3: SBP>160, DBP>100, HR>110. Please correlate with pt's concurrent underlying medical hx and clinical setting that may mimic elevated VS.  Daily Thiamine, Folate, and Multivitamins  Recommend higher level of substance abuse rehab beyond IOP; e.g. inpatient/residential rehab. Resources provided in discharge instructions.    RISK ASSESSMENT  Please continue PEC/CEC(PEC signed 11/23/23 @2102hrs)-- expressed concerns she will leave AMA w/o it, go home, and relapse into heavy drinking (when pt gets suicidal). Pt currently denying SI for several days now, can lift PEC once medically stable for discharge as long as pt is still agreeable to inpatient rehab.    FOLLOW UP  Will follow up while in house    DISPOSITION - once medically cleared:  Patient may be discharged home with next of kin with outpatient psychaitric follow up/rehab in order to attend inpatient  rehab.    Please contact ON CALL psychiatry service (24/7) for any acute issues that may arise.    IVET Johnson   Psychiatry  [facility]    --------------------------------------------------------------------------------------------------------------------------------------------------------------------------------------------------------------------------------------    CONTINUED OBJECTIVE clinical data & findings reviewed and noted for above decision making    Current Medications:   Scheduled Meds:    diazePAM  10 mg Oral Q8H    dicyclomine  10 mg Oral BID    enoxparin  40 mg Subcutaneous Daily    EScitalopram oxalate  10 mg Oral Daily    fluticasone furoate-vilanteroL  1 puff Inhalation Daily    folic acid  1 mg Oral Daily    gabapentin  600 mg Oral TID    hydroCHLOROthiazide  25 mg Oral Daily    insulin aspart U-100  4 Units Subcutaneous TIDWM    insulin detemir U-100  13 Units Subcutaneous QHS    levothyroxine  75 mcg Oral Before breakfast    losartan  100 mg Oral Daily    multivitamin  1 tablet Oral Daily    nicotine  1 patch Transdermal Daily    pantoprazole  40 mg Oral Daily    thiamine  100 mg Oral Daily    traZODone  200 mg Oral QHS     PRN Meds: acetaminophen, dextrose 10%, dextrose 10%, glucagon (human recombinant), glucose, glucose, loperamide, lorazepam, melatonin, naloxone, ondansetron, simethicone, sodium chloride 0.9%    Allergies:   Review of patient's allergies indicates:   Allergen Reactions    Lortab [hydrocodone-acetaminophen] Itching    Promethazine Itching and Other (See Comments)       Vitals  Vitals:    11/27/23 0905   BP:    Pulse: 86   Resp: 15   Temp:        Labs/Imaging/Studies:  Recent Results (from the past 24 hour(s))   POCT glucose    Collection Time: 11/26/23 10:55 AM   Result Value Ref Range    POCT Glucose 213 (H) 70 - 110 mg/dL   POCT glucose    Collection Time: 11/26/23  5:34 PM   Result Value Ref Range    POCT Glucose 156 (H) 70 - 110 mg/dL    POCT glucose    Collection Time: 11/26/23  8:06 PM   Result Value Ref Range    POCT Glucose 239 (H) 70 - 110 mg/dL   Comprehensive Metabolic Panel (CMP)    Collection Time: 11/27/23  2:16 AM   Result Value Ref Range    Sodium 138 136 - 145 mmol/L    Potassium 4.2 3.5 - 5.1 mmol/L    Chloride 101 95 - 110 mmol/L    CO2 27 23 - 29 mmol/L    Glucose 231 (H) 70 - 110 mg/dL    BUN 9 8 - 23 mg/dL    Creatinine 1.0 0.5 - 1.4 mg/dL    Calcium 9.0 8.7 - 10.5 mg/dL    Total Protein 5.8 (L) 6.0 - 8.4 g/dL    Albumin 3.2 (L) 3.5 - 5.2 g/dL    Total Bilirubin 0.4 0.1 - 1.0 mg/dL    Alkaline Phosphatase 65 55 - 135 U/L    AST 45 (H) 10 - 40 U/L    ALT 40 10 - 44 U/L    eGFR >60.0 >60 mL/min/1.73 m^2    Anion Gap 10 8 - 16 mmol/L   CBC with Automated Differential    Collection Time: 11/27/23  2:16 AM   Result Value Ref Range    WBC 6.43 3.90 - 12.70 K/uL    RBC 3.69 (L) 4.00 - 5.40 M/uL    Hemoglobin 9.8 (L) 12.0 - 16.0 g/dL    Hematocrit 32.0 (L) 37.0 - 48.5 %    MCV 87 82 - 98 fL    MCH 26.6 (L) 27.0 - 31.0 pg    MCHC 30.6 (L) 32.0 - 36.0 g/dL    RDW 18.5 (H) 11.5 - 14.5 %    Platelets 68 (L) 150 - 450 K/uL    MPV 11.0 9.2 - 12.9 fL    Immature Granulocytes 1.2 (H) 0.0 - 0.5 %    Gran # (ANC) 1.6 (L) 1.8 - 7.7 K/uL    Immature Grans (Abs) 0.08 (H) 0.00 - 0.04 K/uL    Lymph # 4.3 1.0 - 4.8 K/uL    Mono # 0.4 0.3 - 1.0 K/uL    Eos # 0.1 0.0 - 0.5 K/uL    Baso # 0.02 0.00 - 0.20 K/uL    nRBC 1 (A) 0 /100 WBC    Gran % 24.3 (L) 38.0 - 73.0 %    Lymph % 66.6 (H) 18.0 - 48.0 %    Mono % 6.7 4.0 - 15.0 %    Eosinophil % 0.9 0.0 - 8.0 %    Basophil % 0.3 0.0 - 1.9 %    Differential Method Automated    Magnesium    Collection Time: 11/27/23  2:16 AM   Result Value Ref Range    Magnesium 1.6 1.6 - 2.6 mg/dL   Phosphorus    Collection Time: 11/27/23  2:16 AM   Result Value Ref Range    Phosphorus 3.9 2.7 - 4.5 mg/dL   POCT glucose    Collection Time: 11/27/23  7:11 AM   Result Value Ref Range    POCT Glucose 227 (H) 70 - 110 mg/dL      Imaging Results              X-Ray Chest AP Portable (Final result)  Result time 11/23/23 20:02:18      Final result by Uche Montano MD (11/23/23 20:02:18)                   Impression:      No acute intrathoracic process.      Electronically signed by: Uche Montano MD  Date:    11/23/2023  Time:    20:02               Narrative:    EXAMINATION:  XR CHEST AP PORTABLE    CLINICAL HISTORY:  Hypoxemia    TECHNIQUE:  Single frontal view of the chest was performed.    COMPARISON:  11/14/2023.    FINDINGS:  There are postoperative changes in the right axillary region and the right chest wall.  Monitoring EKG leads are present.    The trachea is unremarkable.  There are calcifications of the aortic knob.  The cardiomediastinal silhouette is within normal limits.  There is no evidence of free air beneath the hemidiaphragms.  There are no pleural effusions.  There is no evidence of a pneumothorax.  There is no evidence of pneumomediastinum.  No airspace opacity is present.  There are degenerative changes in the osseous structures.

## 2023-11-27 NOTE — PLAN OF CARE
Problem: Adult Inpatient Plan of Care  Goal: Plan of Care Review  Outcome: Ongoing, Progressing  Flowsheets (Taken 11/27/2023 0122)  Plan of Care Reviewed With: patient  Goal: Patient-Specific Goal (Individualized)  Outcome: Ongoing, Progressing  Goal: Optimal Comfort and Wellbeing  Outcome: Ongoing, Progressing  Intervention: Monitor Pain and Promote Comfort  Flowsheets (Taken 11/27/2023 0122)  Pain Management Interventions: relaxation techniques promoted  Goal: Readiness for Transition of Care  Outcome: Ongoing, Progressing     Problem: Adult Inpatient Plan of Care  Goal: Patient-Specific Goal (Individualized)  Outcome: Ongoing, Progressing     Problem: Adult Inpatient Plan of Care  Goal: Optimal Comfort and Wellbeing  Outcome: Ongoing, Progressing  Intervention: Monitor Pain and Promote Comfort  Flowsheets (Taken 11/27/2023 0122)  Pain Management Interventions: relaxation techniques promoted

## 2023-11-27 NOTE — ASSESSMENT & PLAN NOTE
/55 on admit.  Concerned that withdrawal will exacerbate.  Home meds include clonidine, metoprolol, losartan, and HCTZ but patient not taking.    - Continue losartan  - F/u outpatient

## 2023-11-28 LAB
ALBUMIN SERPL BCP-MCNC: 3.3 G/DL (ref 3.5–5.2)
ALP SERPL-CCNC: 70 U/L (ref 55–135)
ALT SERPL W/O P-5'-P-CCNC: 35 U/L (ref 10–44)
ANION GAP SERPL CALC-SCNC: 9 MMOL/L (ref 8–16)
ANISOCYTOSIS BLD QL SMEAR: SLIGHT
AST SERPL-CCNC: 29 U/L (ref 10–40)
BASOPHILS # BLD AUTO: 0.01 K/UL (ref 0–0.2)
BASOPHILS NFR BLD: 0.2 % (ref 0–1.9)
BILIRUB SERPL-MCNC: 0.4 MG/DL (ref 0.1–1)
BUN SERPL-MCNC: 15 MG/DL (ref 8–23)
CALCIUM SERPL-MCNC: 9.4 MG/DL (ref 8.7–10.5)
CHLORIDE SERPL-SCNC: 97 MMOL/L (ref 95–110)
CO2 SERPL-SCNC: 29 MMOL/L (ref 23–29)
CREAT SERPL-MCNC: 1.2 MG/DL (ref 0.5–1.4)
DIFFERENTIAL METHOD: ABNORMAL
EOSINOPHIL # BLD AUTO: 0.1 K/UL (ref 0–0.5)
EOSINOPHIL NFR BLD: 1.1 % (ref 0–8)
ERYTHROCYTE [DISTWIDTH] IN BLOOD BY AUTOMATED COUNT: 18.2 % (ref 11.5–14.5)
EST. GFR  (NO RACE VARIABLE): 50.2 ML/MIN/1.73 M^2
GLUCOSE SERPL-MCNC: 360 MG/DL (ref 70–110)
HCT VFR BLD AUTO: 32 % (ref 37–48.5)
HGB BLD-MCNC: 9.7 G/DL (ref 12–16)
IMM GRANULOCYTES # BLD AUTO: 0.01 K/UL (ref 0–0.04)
IMM GRANULOCYTES NFR BLD AUTO: 0.2 % (ref 0–0.5)
LYMPHOCYTES # BLD AUTO: 4 K/UL (ref 1–4.8)
LYMPHOCYTES NFR BLD: 71 % (ref 18–48)
MAGNESIUM SERPL-MCNC: 1.4 MG/DL (ref 1.6–2.6)
MCH RBC QN AUTO: 26.6 PG (ref 27–31)
MCHC RBC AUTO-ENTMCNC: 30.3 G/DL (ref 32–36)
MCV RBC AUTO: 88 FL (ref 82–98)
MONOCYTES # BLD AUTO: 0.4 K/UL (ref 0.3–1)
MONOCYTES NFR BLD: 7.8 % (ref 4–15)
NEUTROPHILS # BLD AUTO: 1.1 K/UL (ref 1.8–7.7)
NEUTROPHILS NFR BLD: 19.7 % (ref 38–73)
NRBC BLD-RTO: 0 /100 WBC
PHOSPHATE SERPL-MCNC: 4.4 MG/DL (ref 2.7–4.5)
PLATELET # BLD AUTO: 56 K/UL (ref 150–450)
PLATELET BLD QL SMEAR: ABNORMAL
PMV BLD AUTO: 10.6 FL (ref 9.2–12.9)
POCT GLUCOSE: 317 MG/DL (ref 70–110)
POCT GLUCOSE: 334 MG/DL (ref 70–110)
POCT GLUCOSE: 336 MG/DL (ref 70–110)
POCT GLUCOSE: 341 MG/DL (ref 70–110)
POTASSIUM SERPL-SCNC: 4.1 MMOL/L (ref 3.5–5.1)
PROT SERPL-MCNC: 6.1 G/DL (ref 6–8.4)
RBC # BLD AUTO: 3.65 M/UL (ref 4–5.4)
SODIUM SERPL-SCNC: 135 MMOL/L (ref 136–145)
WBC # BLD AUTO: 5.62 K/UL (ref 3.9–12.7)

## 2023-11-28 PROCEDURE — 36415 COLL VENOUS BLD VENIPUNCTURE: CPT

## 2023-11-28 PROCEDURE — 83735 ASSAY OF MAGNESIUM: CPT

## 2023-11-28 PROCEDURE — 11000001 HC ACUTE MED/SURG PRIVATE ROOM

## 2023-11-28 PROCEDURE — 99233 SBSQ HOSP IP/OBS HIGH 50: CPT | Mod: ,,,

## 2023-11-28 PROCEDURE — 99233 PR SUBSEQUENT HOSPITAL CARE,LEVL III: ICD-10-PCS | Mod: ,,,

## 2023-11-28 PROCEDURE — 63600175 PHARM REV CODE 636 W HCPCS

## 2023-11-28 PROCEDURE — 84100 ASSAY OF PHOSPHORUS: CPT

## 2023-11-28 PROCEDURE — S4991 NICOTINE PATCH NONLEGEND: HCPCS | Performed by: EMERGENCY MEDICINE

## 2023-11-28 PROCEDURE — 25000003 PHARM REV CODE 250

## 2023-11-28 PROCEDURE — 80053 COMPREHEN METABOLIC PANEL: CPT

## 2023-11-28 PROCEDURE — 85025 COMPLETE CBC W/AUTO DIFF WBC: CPT

## 2023-11-28 PROCEDURE — 25000242 PHARM REV CODE 250 ALT 637 W/ HCPCS

## 2023-11-28 PROCEDURE — 25000003 PHARM REV CODE 250: Performed by: EMERGENCY MEDICINE

## 2023-11-28 RX ORDER — INSULIN ASPART 100 [IU]/ML
7 INJECTION, SOLUTION INTRAVENOUS; SUBCUTANEOUS
Status: DISCONTINUED | OUTPATIENT
Start: 2023-11-28 | End: 2023-11-29

## 2023-11-28 RX ORDER — MAGNESIUM SULFATE HEPTAHYDRATE 40 MG/ML
2 INJECTION, SOLUTION INTRAVENOUS
Status: COMPLETED | OUTPATIENT
Start: 2023-11-28 | End: 2023-11-28

## 2023-11-28 RX ADMIN — DIAZEPAM 5 MG: 5 TABLET ORAL at 08:11

## 2023-11-28 RX ADMIN — INSULIN ASPART 2 UNITS: 100 INJECTION, SOLUTION INTRAVENOUS; SUBCUTANEOUS at 08:11

## 2023-11-28 RX ADMIN — FOLIC ACID 1 MG: 1 TABLET ORAL at 07:11

## 2023-11-28 RX ADMIN — TRAZODONE HYDROCHLORIDE 200 MG: 100 TABLET ORAL at 08:11

## 2023-11-28 RX ADMIN — GABAPENTIN 600 MG: 300 CAPSULE ORAL at 08:11

## 2023-11-28 RX ADMIN — Medication 100 MG: at 07:11

## 2023-11-28 RX ADMIN — LEVOTHYROXINE SODIUM 75 MCG: 75 TABLET ORAL at 06:11

## 2023-11-28 RX ADMIN — DIAZEPAM 10 MG: 5 TABLET ORAL at 08:11

## 2023-11-28 RX ADMIN — GABAPENTIN 600 MG: 300 CAPSULE ORAL at 07:11

## 2023-11-28 RX ADMIN — PANTOPRAZOLE SODIUM 40 MG: 40 TABLET, DELAYED RELEASE ORAL at 07:11

## 2023-11-28 RX ADMIN — DICYCLOMINE HYDROCHLORIDE 10 MG: 10 CAPSULE ORAL at 08:11

## 2023-11-28 RX ADMIN — HYDROCHLOROTHIAZIDE 25 MG: 25 TABLET ORAL at 07:11

## 2023-11-28 RX ADMIN — BACLOFEN 5 MG: 5 TABLET ORAL at 08:11

## 2023-11-28 RX ADMIN — INSULIN ASPART 4 UNITS: 100 INJECTION, SOLUTION INTRAVENOUS; SUBCUTANEOUS at 07:11

## 2023-11-28 RX ADMIN — ESCITALOPRAM OXALATE 10 MG: 10 TABLET ORAL at 07:11

## 2023-11-28 RX ADMIN — Medication 6 MG: at 08:11

## 2023-11-28 RX ADMIN — LOSARTAN POTASSIUM 100 MG: 50 TABLET, FILM COATED ORAL at 07:11

## 2023-11-28 RX ADMIN — FLUTICASONE FUROATE AND VILANTEROL TRIFENATATE 1 PUFF: 100; 25 POWDER RESPIRATORY (INHALATION) at 11:11

## 2023-11-28 RX ADMIN — LORAZEPAM 2 MG: 2 INJECTION INTRAMUSCULAR; INTRAVENOUS at 05:11

## 2023-11-28 RX ADMIN — MAGNESIUM SULFATE HEPTAHYDRATE 2 G: 40 INJECTION, SOLUTION INTRAVENOUS at 11:11

## 2023-11-28 RX ADMIN — BACLOFEN 5 MG: 5 TABLET ORAL at 07:11

## 2023-11-28 RX ADMIN — NICOTINE 1 PATCH: 14 PATCH, EXTENDED RELEASE TRANSDERMAL at 07:11

## 2023-11-28 RX ADMIN — MAGNESIUM SULFATE HEPTAHYDRATE 2 G: 40 INJECTION, SOLUTION INTRAVENOUS at 01:11

## 2023-11-28 RX ADMIN — THERA TABS 1 TABLET: TAB at 07:11

## 2023-11-28 RX ADMIN — INSULIN ASPART 4 UNITS: 100 INJECTION, SOLUTION INTRAVENOUS; SUBCUTANEOUS at 04:11

## 2023-11-28 RX ADMIN — DICYCLOMINE HYDROCHLORIDE 10 MG: 10 CAPSULE ORAL at 07:11

## 2023-11-28 RX ADMIN — ENOXAPARIN SODIUM 40 MG: 40 INJECTION SUBCUTANEOUS at 03:11

## 2023-11-28 RX ADMIN — INSULIN ASPART 7 UNITS: 100 INJECTION, SOLUTION INTRAVENOUS; SUBCUTANEOUS at 04:11

## 2023-11-28 RX ADMIN — INSULIN ASPART 4 UNITS: 100 INJECTION, SOLUTION INTRAVENOUS; SUBCUTANEOUS at 11:11

## 2023-11-28 RX ADMIN — ACETAMINOPHEN 650 MG: 325 TABLET ORAL at 03:11

## 2023-11-28 RX ADMIN — INSULIN ASPART 7 UNITS: 100 INJECTION, SOLUTION INTRAVENOUS; SUBCUTANEOUS at 11:11

## 2023-11-28 RX ADMIN — GABAPENTIN 600 MG: 300 CAPSULE ORAL at 03:11

## 2023-11-28 RX ADMIN — BACLOFEN 5 MG: 5 TABLET ORAL at 03:11

## 2023-11-28 NOTE — ASSESSMENT & PLAN NOTE
A1c 5.5 on 11/4/23.  Takes Metformin at home.  Glucose 59 on admit.   PO intake has increased during detox period and hyperglycemia noted.    - Starting daily basal with TIDWM bolus. Dose increased today in setting of increased po snack and meal intake.   - POCT glucose  - Hypoglycemia precautions  - Diabetic diet

## 2023-11-28 NOTE — NURSING
Pt remained free from injuries within the shift. No suicidal intent verbalized, pt is calm and cooperative all night. Sitter remained at the bedside. Fall prevention protocol maintained. All needs attended

## 2023-11-28 NOTE — PLAN OF CARE
11/28/23 1249   Post-Acute Status   Post-Acute Authorization Placement;Other   Other Status Community Services   Hospital Resources/Appts/Education Provided Community resources provided;Appointments scheduled and added to AVS   Discharge Delays None known at this time   Discharge Plan   Discharge Plan A Rehab  (substance abuse reahb in [place])   Discharge Plan B Home with family

## 2023-11-28 NOTE — PROGRESS NOTES
[facility] Medicine  Progress Note    Patient Name: Earl Abdul  MRN: [id number]  Patient Class: IP- Inpatient   Admission Date: 2023  Length of Stay: 4 days  Attending Physician: Kirk Geotz MD  Primary Care Provider: Andrew Rodriguez MD        Subjective:     Principal Problem:Suicide attempt by cutting of wrist        HPI:  Earl Abdul is a 63-year-old female with a medical history of depression, past suicide ideation and attempt, alcohol use disorder, alcohol withdrawl seizures, CLL not on treatment, DM2, COPD, HTN, and HLD who presented to [facility] ED on 23 after self-harm and found to be acutely intoxicated by alcohol.  She tells me that she has been very depressed lately due to missing her son who is .  She made superficial incisions to both wrists two days prior to admission.  She also stated that she had been drinking a lot of vodka.  States she is in pain all over her body and is worried about withdrawing from alcohol.  Denies chest pain, palpitations, abdominal pain, nausea, emesis, melena, and calf pain.  Denies audio and visual hallucinations and agitation.  Alert and oriented to person, place, and time during my examination.  Numerous prior admissions for similar episodes most recently  -  requiring a Valium taper.  Has been to residential rehab several times, attended AA meetings, completed IOP in the past.     In the ED, /55  RR 20 and afebrile with sats 93-96% on 2L.  Labs notable for WBC 34 Hgb 11.7 Plt 105 K 5.3 Bicarb 18 Gap 24 Glucose 59 AST 79 BHB 5.2 EtOH 279.  VBG showed pH 7.388 PCO2 37 PO2 118.  CXR showed no acute findings.  EKG showed sinus tachycardia with a rate 108bpm and QTc 436ms.  Received Duonebs, thiamine, folic acid, and nicotine patch.  She was placed under a PEC.    The patient was admitted to Hospital Medicine for further workup and management.    Overview/Hospital Course:  PEC on 23.  Patient  tremulous during withdrawal period, started on Valium q6h and CIWA precautions. Denies hallucinations or seizures, however patient's  states she had seizures 6 or 7 years ago. Patient's  also states that patient has not been compliant with home valium tapers in the past. Tapering valium while inpatient. Patient's  Henrique is concerned that if she is not under CEC, she will leave AMA and will have recurring SI and SA while intoxicated. Psychiatry is following, greatly appreciate recommendations.  CEC expires 12/8/23. Plan to DC after Valium taper completed with CEC lifted to facilitate patient's travel to [facility] for voluntary substance use rehab.    Interval History: Back pain improved, in better spirits with family at bedside.     Review of Systems   Constitutional:  Negative for activity change, appetite change and fever.   Respiratory:  Negative for cough, shortness of breath and wheezing.    Cardiovascular:  Negative for chest pain and leg swelling.   Gastrointestinal:  Negative for abdominal pain, constipation, diarrhea, nausea and vomiting.   Musculoskeletal:  Positive for back pain.   Skin:  Negative for rash.   Neurological:  Negative for headaches.     Objective:     Vital Signs (Most Recent):  Temp: 97.5 °F (36.4 °C) (11/28/23 1130)  Pulse: 93 (11/28/23 1130)  Resp: 16 (11/28/23 1130)  BP: (!) 133/58 (11/28/23 1130)  SpO2: (!) 94 % (11/28/23 1130) Vital Signs (24h Range):  Temp:  [97.1 °F (36.2 °C)-98.3 °F (36.8 °C)] 97.5 °F (36.4 °C)  Pulse:  [] 93  Resp:  [16-19] 16  SpO2:  [92 %-95 %] 94 %  BP: (106-150)/(56-75) 133/58     Weight: 86.3 kg (190 lb 4.1 oz)  Body mass index is 30.71 kg/m².    Intake/Output Summary (Last 24 hours) at 11/28/2023 5959  Last data filed at 11/28/2023 0914  Gross per 24 hour   Intake 120 ml   Output --   Net 120 ml         Physical Exam  Vitals and nursing note reviewed.   HENT:      Head: Normocephalic and atraumatic.      Mouth/Throat:       Mouth: Mucous membranes are moist.      Pharynx: Oropharynx is clear.   Eyes:      Extraocular Movements: Extraocular movements intact.      Conjunctiva/sclera: Conjunctivae normal.   Cardiovascular:      Rate and Rhythm: Normal rate and regular rhythm.      Pulses: Normal pulses.   Pulmonary:      Effort: Pulmonary effort is normal. No respiratory distress.      Breath sounds: No wheezing or rales.   Abdominal:      Palpations: Abdomen is soft.      Tenderness: There is no abdominal tenderness. There is no guarding.   Musculoskeletal:      Cervical back: Normal range of motion.      Right lower leg: No edema.      Left lower leg: No edema.   Skin:     General: Skin is warm and dry.   Neurological:      General: No focal deficit present.      Mental Status: She is alert and oriented to person, place, and time.   Psychiatric:         Mood and Affect: Mood normal.             Significant Labs: All pertinent labs within the past 24 hours have been reviewed.    Significant Imaging: I have reviewed all pertinent imaging results/findings within the past 24 hours.    Assessment/Plan:      * Suicide attempt by cutting of wrist  Presented due to cutting bilateral wrists.  Wounds are superficial and she is hemodynamically stable.  States she has been missing her son who is  and has been drinking in excess.  EtOH level 279 on admit.  Reports that she follows with an outside Psychiatrist and has seen Dr. Cortes in the past. Abilify prescribed but patient not taking at home. PEC instituted in the ED. CEC expires 23.    - Psychiatry consulted, appreciate recs    SIRS (systemic inflammatory response syndrome)  WBC 34 > 16 > 7 during hospitalization.   Intermittent tachycardia and hypertension.    Suspect 2/2 alcohol withdrawal rather than infection.    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   Passed voiding trial, finley removed.    Hyperkalemia  K 5.3 on admit.  EKG without T wave changes.  Lokelma in ED.    - Daily CMP  - Shift or replete PRN    GERD (gastroesophageal reflux disease)  Chronic and stable    - Continue home pantoprazole      CLL (chronic lymphocytic leukemia)  Chronic and stable.  Not currently on therapy.  Followed by Dr. Montano.    - CBC      Alcoholic ketoacidosis  Reports drinking vodka with last drink day of admit.  EtOH 279.  BHB 5.2.  Bicarb 18 Gap 24.  pH 7.388 PCO2 37 PO2 118.  On 2L NC.    - CMP  - Continue to monitor    Type 2 diabetes mellitus with hypoglycemia  A1c 5.5 on 11/4/23.  Takes Metformin at home.  Glucose 59 on admit.   PO intake has increased during detox period and hyperglycemia noted.    - Starting daily basal with TIDWM bolus. Dose increased today in setting of increased po snack and meal intake.   - POCT glucose  - Hypoglycemia precautions  - Diabetic diet    COPD (chronic obstructive pulmonary disease)  Does not appear to be in acute respiratory distress but is requiring 2L.  CXR unremarkable.    - Continue home inhaler  - Supplemental O2 goal sats 88-92%, wean as tolerated  - [place] on smoking cessation    Malignant neoplasm of central portion of right breast in female, estrogen receptor positive  Last clinic visit 6/30/22  Letrozole started in February 2021.  Transitioned to anastrozole but patient not taking.    - F/u outpatient    Thrombocytopenia  Patient was found to have thrombocytopenia, the likely etiology is secondary to alcohol use disorder, less likely but possible CLL contributory. Will monitor the platelets Daily. Will transfuse if platelet count is <10k. Hold DVT prophylaxis if platelets are <50k. The patient's platelet results have been reviewed and are listed below.  Recent Labs   Lab 11/28/23  0626   PLT 56*           Hyperlipidemia  Chronic and stable. Statin prescribed but patient not taking.     - F/u outpatient      Depression with anxiety  See Suicide attempt by cutting of wrist.    - Continue home escitalopram        Tobacco  abuse  Chronic.  COPD and cancer risk factor.    - Nicotine patch  - Encourage cessation      Essential hypertension  /55 on admit.  Concerned that withdrawal will exacerbate.  Home meds include clonidine, metoprolol, losartan, and HCTZ but patient not taking.    - Continue losartan  - F/u outpatient    Alcohol use disorder, severe, dependence  Reports drinking vodka with last drink day of admit.  EtOH 279.  High risk for withdrawal seizures as she has experienced these in the past.  Numerous prior admissions for similar episodes most recently 11/4 - 11/6 requiring a Valium taper.  Has been to residential rehab several times, attended AA meetings, completed IOP in the past.     Per patient's , patient has not been compliant with home valium tapers. Will taper while under CEC. Ongoing discussions with family regarding dispo planning.    - Valium taper with planned completion on AM of 11/29/23.  - Ativan 2mg q4h PRN for CIWA > 8  - CIWA checks  - Thiamine, folate, multivitamin supplementation  - Fall precautions  - Seizure precautions  - [place] on cessation  - Psych consulted, appreciate recs      VTE Risk Mitigation (From admission, onward)           Ordered     enoxaparin injection 40 mg  Daily         11/23/23 2139     IP VTE HIGH RISK PATIENT  Once         11/23/23 2139     Place sequential compression device  Until discontinued         11/23/23 2119                    Discharge Planning   BECCA: 11/29/2023     Code Status: Full Code   Is the patient medically ready for discharge?: No    Reason for patient still in hospital (select all that apply): Treatment  Discharge Plan A: Rehab (Substance abuse rehab)   Discharge Delays: None known at this time              Gallo Tineo MD  Department of Hospital Medicine   [facility] - Med Surg

## 2023-11-28 NOTE — PLAN OF CARE
[facility]  Discharge Reassessment    Primary Care Provider: Andrew Rodriguez MD    Expected Discharge Date: 11/29/2023    Reassessment (most recent)       Discharge Reassessment - 11/28/23 1251          Discharge Reassessment    Assessment Type Discharge Planning Reassessment     Did the patient's condition or plan change since previous assessment? Yes     Discharge Plan discussed with: Spouse/sig other;Patient     Communicated BECCA with patient/caregiver Yes     Discharge Plan A Rehab   Substance abuse rehab    Discharge Plan B Home with family     DME Needed Upon Discharge  none     Transition of Care Barriers Substance Abuse;Mental illness     Why the patient remains in the hospital Requires continued medical care        Post-Acute Status    Post-Acute Authorization Placement     Other Status Community Services     Hospital Resources/Appts/Education Provided Appointments scheduled and added to AVS     Discharge Delays None known at this time

## 2023-11-28 NOTE — ASSESSMENT & PLAN NOTE
Reports drinking vodka with last drink day of admit.  EtOH 279.  High risk for withdrawal seizures as she has experienced these in the past.  Numerous prior admissions for similar episodes most recently 11/4 - 11/6 requiring a Valium taper.  Has been to residential rehab several times, attended AA meetings, completed IOP in the past.     Per patient's , patient has not been compliant with home valium tapers. Will taper while under CEC. Ongoing discussions with family regarding dispo planning.    - Valium taper with planned completion on AM of 11/29/23.  - Ativan 2mg q4h PRN for CIWA > 8  - CIWA checks  - Thiamine, folate, multivitamin supplementation  - Fall precautions  - Seizure precautions  - [place] on cessation  - Psych consulted, appreciate recs

## 2023-11-28 NOTE — SUBJECTIVE & OBJECTIVE
Interval History: Back pain improved, in better spirits with family at bedside.     Review of Systems   Constitutional:  Negative for activity change, appetite change and fever.   Respiratory:  Negative for cough, shortness of breath and wheezing.    Cardiovascular:  Negative for chest pain and leg swelling.   Gastrointestinal:  Negative for abdominal pain, constipation, diarrhea, nausea and vomiting.   Musculoskeletal:  Positive for back pain.   Skin:  Negative for rash.   Neurological:  Negative for headaches.     Objective:     Vital Signs (Most Recent):  Temp: 97.5 °F (36.4 °C) (11/28/23 1130)  Pulse: 93 (11/28/23 1130)  Resp: 16 (11/28/23 1130)  BP: (!) 133/58 (11/28/23 1130)  SpO2: (!) 94 % (11/28/23 1130) Vital Signs (24h Range):  Temp:  [97.1 °F (36.2 °C)-98.3 °F (36.8 °C)] 97.5 °F (36.4 °C)  Pulse:  [] 93  Resp:  [16-19] 16  SpO2:  [92 %-95 %] 94 %  BP: (106-150)/(56-75) 133/58     Weight: 86.3 kg (190 lb 4.1 oz)  Body mass index is 30.71 kg/m².    Intake/Output Summary (Last 24 hours) at 11/28/2023 1454  Last data filed at 11/28/2023 0914  Gross per 24 hour   Intake 120 ml   Output --   Net 120 ml         Physical Exam  Vitals and nursing note reviewed.   HENT:      Head: Normocephalic and atraumatic.      Mouth/Throat:      Mouth: Mucous membranes are moist.      Pharynx: Oropharynx is clear.   Eyes:      Extraocular Movements: Extraocular movements intact.      Conjunctiva/sclera: Conjunctivae normal.   Cardiovascular:      Rate and Rhythm: Normal rate and regular rhythm.      Pulses: Normal pulses.   Pulmonary:      Effort: Pulmonary effort is normal. No respiratory distress.      Breath sounds: No wheezing or rales.   Abdominal:      Palpations: Abdomen is soft.      Tenderness: There is no abdominal tenderness. There is no guarding.   Musculoskeletal:      Cervical back: Normal range of motion.      Right lower leg: No edema.      Left lower leg: No edema.   Skin:     General: Skin is warm and  dry.   Neurological:      General: No focal deficit present.      Mental Status: She is alert and oriented to person, place, and time.   Psychiatric:         Mood and Affect: Mood normal.             Significant Labs: All pertinent labs within the past 24 hours have been reviewed.    Significant Imaging: I have reviewed all pertinent imaging results/findings within the past 24 hours.

## 2023-11-28 NOTE — PLAN OF CARE
Problem: Violence Risk or Actual  Goal: Anger and Impulse Control  Outcome: Ongoing, Progressing     Problem: Adult Inpatient Plan of Care  Goal: Plan of Care Review  Outcome: Ongoing, Progressing  Flowsheets (Taken 11/28/2023 0945)  Plan of Care Reviewed With: patient  Goal: Patient-Specific Goal (Individualized)  Outcome: Ongoing, Progressing  Goal: Absence of Hospital-Acquired Illness or Injury  Outcome: Ongoing, Progressing  Intervention: Identify and Manage Fall Risk  Flowsheets (Taken 11/28/2023 0945)  Safety Promotion/Fall Prevention:   assistive device/personal item within reach   medications reviewed   side rails raised x 2  Intervention: Prevent Skin Injury  Flowsheets (Taken 11/28/2023 0945)  Body Position: position changed independently  Skin Protection: adhesive use limited  Intervention: Prevent and Manage VTE (Venous Thromboembolism) Risk  Flowsheets (Taken 11/28/2023 0945)  Activity Management: Rolling - L1  Range of Motion: active ROM (range of motion) encouraged  Goal: Optimal Comfort and Wellbeing  Outcome: Ongoing, Progressing  Intervention: Monitor Pain and Promote Comfort  Flowsheets (Taken 11/28/2023 0945)  Pain Management Interventions: pain management plan reviewed with patient/caregiver  Goal: Readiness for Transition of Care  Outcome: Ongoing, Progressing     Problem: Diabetes Comorbidity  Goal: Blood Glucose Level Within Targeted Range  Outcome: Ongoing, Progressing  Intervention: Monitor and Manage Glycemia  Flowsheets (Taken 11/28/2023 0945)  Glycemic Management: blood glucose monitored     Problem: Impaired Wound Healing  Goal: Optimal Wound Healing  Outcome: Ongoing, Progressing  Intervention: Promote Wound Healing  Flowsheets (Taken 11/28/2023 0945)  Activity Management: Rolling - L1  Pain Management Interventions: pain management plan reviewed with patient/caregiver     Problem: Infection  Goal: Absence of Infection Signs and Symptoms  Outcome: Ongoing, Progressing  Intervention:  Prevent or Manage Infection  Flowsheets (Taken 11/28/2023 2155)  Infection Management: aseptic technique maintained  Isolation Precautions:   protective   precautions maintained

## 2023-11-28 NOTE — PLAN OF CARE
Problem: Violence Risk or Actual  Goal: Anger and Impulse Control  Outcome: Ongoing, Progressing  Intervention: Minimize Safety Risk  Flowsheets (Taken 11/27/2023 2352)  Enhanced Safety Measures:  at bedside     Problem: Adult Inpatient Plan of Care  Goal: Plan of Care Review  Outcome: Ongoing, Progressing  Flowsheets (Taken 11/27/2023 2352)  Plan of Care Reviewed With: patient  Goal: Patient-Specific Goal (Individualized)  Outcome: Ongoing, Progressing  Goal: Optimal Comfort and Wellbeing  Outcome: Ongoing, Progressing     Problem: Diabetes Comorbidity  Goal: Blood Glucose Level Within Targeted Range  Outcome: Ongoing, Progressing     Problem: Fall Injury Risk  Goal: Absence of Fall and Fall-Related Injury  Outcome: Ongoing, Progressing  Intervention: Identify and Manage Contributors  Flowsheets (Taken 11/27/2023 2352)  Medication Review/Management:   medications reviewed   high-risk medications identified

## 2023-11-28 NOTE — ASSESSMENT & PLAN NOTE
Patient was found to have thrombocytopenia, the likely etiology is secondary to alcohol use disorder, less likely but possible CLL contributory. Will monitor the platelets Daily. Will transfuse if platelet count is <10k. Hold DVT prophylaxis if platelets are <50k. The patient's platelet results have been reviewed and are listed below.  Recent Labs   Lab 11/28/23  0626   PLT 56*

## 2023-11-28 NOTE — ASSESSMENT & PLAN NOTE
Does not appear to be in acute respiratory distress but is requiring 2L.  CXR unremarkable.    - Continue home inhaler  - Supplemental O2 goal sats 88-92%, wean as tolerated  - [place] on smoking cessation

## 2023-11-28 NOTE — PROGRESS NOTES
"CONSULTATION LIAISON PSYCHIATRY PROGRESS NOTE    Patient Name: Earl Abdul  MRN: [id number]  Patient Class: IP- Inpatient  Admission Date: 11/23/2023  Attending Physician: Kirk Goetz MD      SUBJECTIVE:   Earl Abdul is a 63 y.o. female with past psychiatric history of depression with SA in 2017, alcohol use disorder complicated by seizures, tobacco use disorder & past pertinent medical history of sarcoidosis, COPD, CLL, T2DM, HTN, HLD, fibromyalgia presents to the ED/admitted to the hospital for Suicide attempt by cutting of wrist and alcoholic ketoacidosis.     Psychiatry consulted for "PEC for SA; alcohol use disorder"    Patient see and chart reviewed. Alert and oriented x4. Calm and cooperative during interview.     Pt resting in bed with sitter at bedside. Awakens to my voice. States she is feeling "really good" today and expresses gratitude for everyone who has taken care of her while admitted. She again tells me of her plan to go to [place] for rehab. States she and her  will be flying out Thursday. She denies any current anxiety or depression. Denies SI/HI/AVH stating "I never actually wanted to kill myself." Denies any signs of withdrawal. Hand tremor has decreased since yesterday. Reports she slept well last night and appetite has been good.      OBJECTIVE:    Mental Status Exam:  General Appearance: adequately groomed, dressed in hospital garb, lying in bed, appears older than stated age  Behavior: normal, cooperative, polite, appropriate eye-contact, under good behavioral control  Involuntary Movements and Motor Activity: no tics, no tremors, no akathisia, no dystonia, no evidence of tardive dyskinesia, tremor of bilateral upper extremities noted at rest  Gait and Station: unable to assess - patient lying down or seated  Speech and Language: normal rate, rhythm, volume, tone, and pitch  Mood: "really good"  Affect: normal, euthymic, reactive, full-range, mood-congruent, " appropriate to situation and context  Thought Process and Associations: linear, organized  Thought Content and Perceptions::  denies SI/HI/AVH  Sensorium and Orientation: grossly intact  Recent and Remote Memory: grossly intact  Attention and Concentration: grossly intact  Fund of Knowledge: grossly intact  Insight: intact, demonstrates awareness of illness and situation  Judgment: impaired due to limited insight into severity of alcohol use disorder    CAM ICU positive? no    ASSESSMENT & RECOMMENDATIONS   Alcohol Use Disorder, Severe, w/ hx of complicated withdrawal seizures  Unspecified Depressive Disorder c/b SA v SI w/ SIB via cutting  R/o SIMD  R/o Prolonged Grief Disorder  R/o Adjustment disorder w/ depressed mood    PSYCH MEDICATIONS  Scheduled:   Lexapro 10mg daily   Trazodone to 200mg nightly   11/23/23 EKG tachy 108bpm w/ QTc 436ms    Alcohol detox management:  Last drink 11/23/23; protocol below for 5-7 days after aforementioned date:  CIWA w/ VS check q4hrs while awake  Continue Valium taper- decrease to 5mg tonight with daily dosing of 5mg thereafter  PRN Ativan 2mg q6hrs for CIWA>8 or 2 of 3: SBP>160, DBP>100, HR>110. Please correlate with pt's concurrent underlying medical hx and clinical setting that may mimic elevated VS.  Daily Thiamine, Folate, and Multivitamins  Recommend higher level of substance abuse rehab beyond IOP; e.g. inpatient/residential rehab. Resources provided in discharge instructions.    RISK ASSESSMENT  Please continue PEC/CEC(PEC signed 11/23/23 @2102hrs)-- expressed concerns she will leave AMA w/o it, go home, and relapse into heavy drinking (when pt gets suicidal). Pt currently denying SI for several days now, can lift CEC once medically stable for discharge as long as pt is still agreeable to inpatient rehab.    FOLLOW UP  Will follow up while in house    DISPOSITION - once medically cleared:  Patient may be discharged home with next of kin with outpatient psychaitric  follow up/rehab in order to attend inpatient rehab.    Please contact ON CALL psychiatry service (24/7) for any acute issues that may arise.    IVET Johnson   Psychiatry  [facility]    --------------------------------------------------------------------------------------------------------------------------------------------------------------------------------------------------------------------------------------    CONTINUED OBJECTIVE clinical data & findings reviewed and noted for above decision making    Current Medications:   Scheduled Meds:    baclofen  5 mg Oral TID    diazePAM  5 mg Oral QHS    Followed by    [START ON 11/29/2023] diazePAM  5 mg Oral Daily    dicyclomine  10 mg Oral BID    enoxparin  40 mg Subcutaneous Daily    EScitalopram oxalate  10 mg Oral Daily    fluticasone furoate-vilanteroL  1 puff Inhalation Daily    folic acid  1 mg Oral Daily    gabapentin  600 mg Oral TID    hydroCHLOROthiazide  25 mg Oral Daily    insulin aspart U-100  4 Units Subcutaneous TIDWM    insulin detemir U-100  13 Units Subcutaneous QHS    levothyroxine  75 mcg Oral Before breakfast    losartan  100 mg Oral Daily    melatonin  6 mg Oral Nightly    multivitamin  1 tablet Oral Daily    nicotine  1 patch Transdermal Daily    pantoprazole  40 mg Oral Daily    thiamine  100 mg Oral Daily    traZODone  200 mg Oral QHS     PRN Meds: acetaminophen, dextrose 10%, dextrose 10%, glucagon (human recombinant), glucose, glucose, insulin aspart U-100, loperamide, lorazepam, naloxone, ondansetron, simethicone, sodium chloride 0.9%    Allergies:   Review of patient's allergies indicates:   Allergen Reactions    Lortab [hydrocodone-acetaminophen] Itching    Promethazine Itching and Other (See Comments)       Vitals  Vitals:    11/28/23 0743   BP: (!) 150/75   Pulse:    Resp:    Temp:        Labs/Imaging/Studies:  Recent Results (from the past 24 hour(s))   POCT glucose    Collection Time: 11/27/23 11:18 AM    Result Value Ref Range    POCT Glucose 247 (H) 70 - 110 mg/dL   POCT glucose    Collection Time: 11/27/23  4:06 PM   Result Value Ref Range    POCT Glucose 330 (H) 70 - 110 mg/dL   POCT glucose    Collection Time: 11/27/23  8:46 PM   Result Value Ref Range    POCT Glucose 354 (H) 70 - 110 mg/dL   POCT glucose    Collection Time: 11/27/23 10:40 PM   Result Value Ref Range    POCT Glucose 356 (H) 70 - 110 mg/dL   Comprehensive Metabolic Panel (CMP)    Collection Time: 11/28/23  6:26 AM   Result Value Ref Range    Sodium 135 (L) 136 - 145 mmol/L    Potassium 4.1 3.5 - 5.1 mmol/L    Chloride 97 95 - 110 mmol/L    CO2 29 23 - 29 mmol/L    Glucose 360 (H) 70 - 110 mg/dL    BUN 15 8 - 23 mg/dL    Creatinine 1.2 0.5 - 1.4 mg/dL    Calcium 9.4 8.7 - 10.5 mg/dL    Total Protein 6.1 6.0 - 8.4 g/dL    Albumin 3.3 (L) 3.5 - 5.2 g/dL    Total Bilirubin 0.4 0.1 - 1.0 mg/dL    Alkaline Phosphatase 70 55 - 135 U/L    AST 29 10 - 40 U/L    ALT 35 10 - 44 U/L    eGFR 50.2 (A) >60 mL/min/1.73 m^2    Anion Gap 9 8 - 16 mmol/L   CBC with Automated Differential    Collection Time: 11/28/23  6:26 AM   Result Value Ref Range    WBC 5.62 3.90 - 12.70 K/uL    RBC 3.65 (L) 4.00 - 5.40 M/uL    Hemoglobin 9.7 (L) 12.0 - 16.0 g/dL    Hematocrit 32.0 (L) 37.0 - 48.5 %    MCV 88 82 - 98 fL    MCH 26.6 (L) 27.0 - 31.0 pg    MCHC 30.3 (L) 32.0 - 36.0 g/dL    RDW 18.2 (H) 11.5 - 14.5 %    Platelets 56 (L) 150 - 450 K/uL    MPV 10.6 9.2 - 12.9 fL    Immature Granulocytes 0.2 0.0 - 0.5 %    Gran # (ANC) 1.1 (L) 1.8 - 7.7 K/uL    Immature Grans (Abs) 0.01 0.00 - 0.04 K/uL    Lymph # 4.0 1.0 - 4.8 K/uL    Mono # 0.4 0.3 - 1.0 K/uL    Eos # 0.1 0.0 - 0.5 K/uL    Baso # 0.01 0.00 - 0.20 K/uL    nRBC 0 0 /100 WBC    Gran % 19.7 (L) 38.0 - 73.0 %    Lymph % 71.0 (H) 18.0 - 48.0 %    Mono % 7.8 4.0 - 15.0 %    Eosinophil % 1.1 0.0 - 8.0 %    Basophil % 0.2 0.0 - 1.9 %    Platelet Estimate Decreased (A)     [place] Slight     Differential Method Automated     Magnesium    Collection Time: 11/28/23  6:26 AM   Result Value Ref Range    Magnesium 1.4 (L) 1.6 - 2.6 mg/dL   Phosphorus    Collection Time: 11/28/23  6:26 AM   Result Value Ref Range    Phosphorus 4.4 2.7 - 4.5 mg/dL     Imaging Results              X-Ray Chest AP Portable (Final result)  Result time 11/23/23 20:02:18      Final result by Uche Montano MD (11/23/23 20:02:18)                   Impression:      No acute intrathoracic process.      Electronically signed by: Uche Montano MD  Date:    11/23/2023  Time:    20:02               Narrative:    EXAMINATION:  XR CHEST AP PORTABLE    CLINICAL HISTORY:  Hypoxemia    TECHNIQUE:  Single frontal view of the chest was performed.    COMPARISON:  11/14/2023.    FINDINGS:  There are postoperative changes in the right axillary region and the right chest wall.  Monitoring EKG leads are present.    The trachea is unremarkable.  There are calcifications of the aortic knob.  The cardiomediastinal silhouette is within normal limits.  There is no evidence of free air beneath the hemidiaphragms.  There are no pleural effusions.  There is no evidence of a pneumothorax.  There is no evidence of pneumomediastinum.  No airspace opacity is present.  There are degenerative changes in the osseous structures.

## 2023-11-29 VITALS
TEMPERATURE: 97 F | HEART RATE: 72 BPM | BODY MASS INDEX: 30.58 KG/M2 | HEIGHT: 66 IN | SYSTOLIC BLOOD PRESSURE: 149 MMHG | OXYGEN SATURATION: 94 % | DIASTOLIC BLOOD PRESSURE: 64 MMHG | WEIGHT: 190.25 LBS | RESPIRATION RATE: 17 BRPM

## 2023-11-29 LAB
ALBUMIN SERPL BCP-MCNC: 3.3 G/DL (ref 3.5–5.2)
ALP SERPL-CCNC: 60 U/L (ref 55–135)
ALT SERPL W/O P-5'-P-CCNC: 32 U/L (ref 10–44)
ANION GAP SERPL CALC-SCNC: 12 MMOL/L (ref 8–16)
AST SERPL-CCNC: 30 U/L (ref 10–40)
BASOPHILS # BLD AUTO: 0.01 K/UL (ref 0–0.2)
BASOPHILS NFR BLD: 0.2 % (ref 0–1.9)
BILIRUB SERPL-MCNC: 0.4 MG/DL (ref 0.1–1)
BUN SERPL-MCNC: 17 MG/DL (ref 8–23)
CALCIUM SERPL-MCNC: 9.3 MG/DL (ref 8.7–10.5)
CHLORIDE SERPL-SCNC: 95 MMOL/L (ref 95–110)
CO2 SERPL-SCNC: 26 MMOL/L (ref 23–29)
CREAT SERPL-MCNC: 0.9 MG/DL (ref 0.5–1.4)
DIFFERENTIAL METHOD: ABNORMAL
EOSINOPHIL # BLD AUTO: 0.1 K/UL (ref 0–0.5)
EOSINOPHIL NFR BLD: 0.9 % (ref 0–8)
ERYTHROCYTE [DISTWIDTH] IN BLOOD BY AUTOMATED COUNT: 17.7 % (ref 11.5–14.5)
EST. GFR  (NO RACE VARIABLE): >60 ML/MIN/1.73 M^2
GLUCOSE SERPL-MCNC: 345 MG/DL (ref 70–110)
HCT VFR BLD AUTO: 29.6 % (ref 37–48.5)
HGB BLD-MCNC: 9.1 G/DL (ref 12–16)
IMM GRANULOCYTES # BLD AUTO: 0.01 K/UL (ref 0–0.04)
IMM GRANULOCYTES NFR BLD AUTO: 0.2 % (ref 0–0.5)
LYMPHOCYTES # BLD AUTO: 3.9 K/UL (ref 1–4.8)
LYMPHOCYTES NFR BLD: 69 % (ref 18–48)
MAGNESIUM SERPL-MCNC: 1.5 MG/DL (ref 1.6–2.6)
MCH RBC QN AUTO: 26.2 PG (ref 27–31)
MCHC RBC AUTO-ENTMCNC: 30.7 G/DL (ref 32–36)
MCV RBC AUTO: 85 FL (ref 82–98)
MONOCYTES # BLD AUTO: 0.6 K/UL (ref 0.3–1)
MONOCYTES NFR BLD: 9.8 % (ref 4–15)
NEUTROPHILS # BLD AUTO: 1.1 K/UL (ref 1.8–7.7)
NEUTROPHILS NFR BLD: 19.9 % (ref 38–73)
NRBC BLD-RTO: 0 /100 WBC
PHOSPHATE SERPL-MCNC: 4 MG/DL (ref 2.7–4.5)
PLATELET # BLD AUTO: 76 K/UL (ref 150–450)
PLATELET BLD QL SMEAR: ABNORMAL
PMV BLD AUTO: 12.6 FL (ref 9.2–12.9)
POCT GLUCOSE: 289 MG/DL (ref 70–110)
POCT GLUCOSE: 293 MG/DL (ref 70–110)
POCT GLUCOSE: 375 MG/DL (ref 70–110)
POCT GLUCOSE: 417 MG/DL (ref 70–110)
POCT GLUCOSE: 463 MG/DL (ref 70–110)
POTASSIUM SERPL-SCNC: 4.3 MMOL/L (ref 3.5–5.1)
PROT SERPL-MCNC: 6.1 G/DL (ref 6–8.4)
RBC # BLD AUTO: 3.47 M/UL (ref 4–5.4)
SODIUM SERPL-SCNC: 133 MMOL/L (ref 136–145)
WBC # BLD AUTO: 5.61 K/UL (ref 3.9–12.7)

## 2023-11-29 PROCEDURE — 85025 COMPLETE CBC W/AUTO DIFF WBC: CPT

## 2023-11-29 PROCEDURE — 25000003 PHARM REV CODE 250

## 2023-11-29 PROCEDURE — 36415 COLL VENOUS BLD VENIPUNCTURE: CPT

## 2023-11-29 PROCEDURE — 80053 COMPREHEN METABOLIC PANEL: CPT

## 2023-11-29 PROCEDURE — 83735 ASSAY OF MAGNESIUM: CPT

## 2023-11-29 PROCEDURE — 94761 N-INVAS EAR/PLS OXIMETRY MLT: CPT

## 2023-11-29 PROCEDURE — 99232 PR SUBSEQUENT HOSPITAL CARE,LEVL II: ICD-10-PCS | Mod: ,,, | Performed by: PSYCHIATRY & NEUROLOGY

## 2023-11-29 PROCEDURE — 63600175 PHARM REV CODE 636 W HCPCS

## 2023-11-29 PROCEDURE — 84100 ASSAY OF PHOSPHORUS: CPT

## 2023-11-29 PROCEDURE — 94640 AIRWAY INHALATION TREATMENT: CPT

## 2023-11-29 PROCEDURE — 99232 SBSQ HOSP IP/OBS MODERATE 35: CPT | Mod: ,,, | Performed by: PSYCHIATRY & NEUROLOGY

## 2023-11-29 PROCEDURE — 25000003 PHARM REV CODE 250: Performed by: EMERGENCY MEDICINE

## 2023-11-29 RX ORDER — INSULIN LISPRO 100 [IU]/ML
9 INJECTION, SOLUTION INTRAVENOUS; SUBCUTANEOUS
Qty: 9 ML | Refills: 0 | Status: ON HOLD | OUTPATIENT
Start: 2023-11-29 | End: 2024-03-03

## 2023-11-29 RX ORDER — INSULIN ASPART 100 [IU]/ML
9 INJECTION, SOLUTION INTRAVENOUS; SUBCUTANEOUS 3 TIMES DAILY
Qty: 9 ML | Refills: 0 | Status: SHIPPED | OUTPATIENT
Start: 2023-11-29 | End: 2023-11-29 | Stop reason: HOSPADM

## 2023-11-29 RX ORDER — INSULIN ASPART 100 [IU]/ML
0-10 INJECTION, SOLUTION INTRAVENOUS; SUBCUTANEOUS
Status: DISCONTINUED | OUTPATIENT
Start: 2023-11-29 | End: 2023-11-29 | Stop reason: HOSPADM

## 2023-11-29 RX ORDER — MAGNESIUM SULFATE HEPTAHYDRATE 40 MG/ML
2 INJECTION, SOLUTION INTRAVENOUS ONCE
Status: COMPLETED | OUTPATIENT
Start: 2023-11-29 | End: 2023-11-29

## 2023-11-29 RX ORDER — LOSARTAN POTASSIUM AND HYDROCHLOROTHIAZIDE 25; 100 MG/1; MG/1
1 TABLET ORAL DAILY
Qty: 30 TABLET | Refills: 0 | Status: ON HOLD | OUTPATIENT
Start: 2023-11-29 | End: 2024-03-03

## 2023-11-29 RX ORDER — LANCETS
EACH MISCELLANEOUS
Qty: 100 EACH | Refills: 5 | Status: SHIPPED | OUTPATIENT
Start: 2023-11-29

## 2023-11-29 RX ORDER — DEXTROSE 4 G
TABLET,CHEWABLE ORAL
Qty: 1 EACH | Refills: 0 | Status: ON HOLD | OUTPATIENT
Start: 2023-11-29 | End: 2024-03-03

## 2023-11-29 RX ORDER — LOPERAMIDE HYDROCHLORIDE 2 MG/1
2 CAPSULE ORAL 4 TIMES DAILY PRN
Qty: 60 CAPSULE | Refills: 0 | Status: ON HOLD | OUTPATIENT
Start: 2023-11-29 | End: 2024-03-03

## 2023-11-29 RX ORDER — BACLOFEN 5 MG/1
5 TABLET ORAL 3 TIMES DAILY
Qty: 30 TABLET | Refills: 0 | Status: ON HOLD | OUTPATIENT
Start: 2023-11-29 | End: 2024-03-03

## 2023-11-29 RX ORDER — INSULIN ASPART 100 [IU]/ML
5 INJECTION, SOLUTION INTRAVENOUS; SUBCUTANEOUS ONCE
Status: COMPLETED | OUTPATIENT
Start: 2023-11-29 | End: 2023-11-29

## 2023-11-29 RX ORDER — INSULIN ASPART 100 [IU]/ML
9 INJECTION, SOLUTION INTRAVENOUS; SUBCUTANEOUS
Status: DISCONTINUED | OUTPATIENT
Start: 2023-11-29 | End: 2023-11-29 | Stop reason: HOSPADM

## 2023-11-29 RX ORDER — DICYCLOMINE HYDROCHLORIDE 10 MG/1
10 CAPSULE ORAL 2 TIMES DAILY
Qty: 60 CAPSULE | Refills: 0 | Status: SHIPPED | OUTPATIENT
Start: 2023-11-29 | End: 2023-12-29

## 2023-11-29 RX ORDER — PEN NEEDLE, DIABETIC 30 GX3/16"
1 NEEDLE, DISPOSABLE MISCELLANEOUS
Qty: 100 EACH | Refills: 5 | Status: ON HOLD | OUTPATIENT
Start: 2023-11-29 | End: 2024-03-03

## 2023-11-29 RX ORDER — TRAZODONE HYDROCHLORIDE 100 MG/1
200 TABLET ORAL NIGHTLY
Qty: 60 TABLET | Refills: 11 | Status: SHIPPED | OUTPATIENT
Start: 2023-11-29 | End: 2024-02-29 | Stop reason: SDUPTHER

## 2023-11-29 RX ORDER — LEVOTHYROXINE SODIUM 75 UG/1
75 TABLET ORAL
Qty: 30 TABLET | Refills: 0 | Status: SHIPPED | OUTPATIENT
Start: 2023-11-30 | End: 2024-02-29 | Stop reason: SDUPTHER

## 2023-11-29 RX ORDER — ESCITALOPRAM OXALATE 10 MG/1
10 TABLET ORAL DAILY
Qty: 30 TABLET | Refills: 0 | Status: ON HOLD | OUTPATIENT
Start: 2023-11-29 | End: 2024-02-14 | Stop reason: HOSPADM

## 2023-11-29 RX ORDER — INSULIN GLARGINE 100 [IU]/ML
15 INJECTION, SOLUTION SUBCUTANEOUS 2 TIMES DAILY
Qty: 9 ML | Refills: 0 | Status: ON HOLD | OUTPATIENT
Start: 2023-11-29 | End: 2024-03-03

## 2023-11-29 RX ORDER — LANOLIN ALCOHOL/MO/W.PET/CERES
100 CREAM (GRAM) TOPICAL DAILY
Qty: 30 TABLET | Refills: 0 | Status: SHIPPED | OUTPATIENT
Start: 2023-11-30 | End: 2023-12-30

## 2023-11-29 RX ORDER — LEVOTHYROXINE SODIUM 75 UG/1
75 TABLET ORAL
Qty: 30 TABLET | Refills: 11 | Status: SHIPPED | OUTPATIENT
Start: 2023-11-30 | End: 2023-11-29 | Stop reason: SDUPTHER

## 2023-11-29 RX ORDER — FOLIC ACID 1 MG/1
1 TABLET ORAL DAILY
Qty: 30 TABLET | Refills: 0 | Status: ON HOLD | OUTPATIENT
Start: 2023-11-30 | End: 2024-03-06

## 2023-11-29 RX ADMIN — PANTOPRAZOLE SODIUM 40 MG: 40 TABLET, DELAYED RELEASE ORAL at 08:11

## 2023-11-29 RX ADMIN — Medication 100 MG: at 08:11

## 2023-11-29 RX ADMIN — FOLIC ACID 1 MG: 1 TABLET ORAL at 08:11

## 2023-11-29 RX ADMIN — LOSARTAN POTASSIUM 100 MG: 50 TABLET, FILM COATED ORAL at 08:11

## 2023-11-29 RX ADMIN — LEVOTHYROXINE SODIUM 75 MCG: 75 TABLET ORAL at 06:11

## 2023-11-29 RX ADMIN — INSULIN ASPART 7 UNITS: 100 INJECTION, SOLUTION INTRAVENOUS; SUBCUTANEOUS at 08:11

## 2023-11-29 RX ADMIN — DICYCLOMINE HYDROCHLORIDE 10 MG: 10 CAPSULE ORAL at 08:11

## 2023-11-29 RX ADMIN — ACETAMINOPHEN 650 MG: 325 TABLET ORAL at 06:11

## 2023-11-29 RX ADMIN — GABAPENTIN 600 MG: 300 CAPSULE ORAL at 08:11

## 2023-11-29 RX ADMIN — ESCITALOPRAM OXALATE 10 MG: 10 TABLET ORAL at 08:11

## 2023-11-29 RX ADMIN — THERA TABS 1 TABLET: TAB at 08:11

## 2023-11-29 RX ADMIN — HYDROCHLOROTHIAZIDE 25 MG: 25 TABLET ORAL at 08:11

## 2023-11-29 RX ADMIN — INSULIN ASPART 5 UNITS: 100 INJECTION, SOLUTION INTRAVENOUS; SUBCUTANEOUS at 02:11

## 2023-11-29 RX ADMIN — INSULIN ASPART 6 UNITS: 100 INJECTION, SOLUTION INTRAVENOUS; SUBCUTANEOUS at 08:11

## 2023-11-29 RX ADMIN — FLUTICASONE FUROATE AND VILANTEROL TRIFENATATE 1 PUFF: 100; 25 POWDER RESPIRATORY (INHALATION) at 11:11

## 2023-11-29 RX ADMIN — INSULIN ASPART 5 UNITS: 100 INJECTION, SOLUTION INTRAVENOUS; SUBCUTANEOUS at 01:11

## 2023-11-29 RX ADMIN — DIAZEPAM 5 MG: 5 TABLET ORAL at 08:11

## 2023-11-29 RX ADMIN — BACLOFEN 5 MG: 5 TABLET ORAL at 08:11

## 2023-11-29 RX ADMIN — INSULIN ASPART 9 UNITS: 100 INJECTION, SOLUTION INTRAVENOUS; SUBCUTANEOUS at 12:11

## 2023-11-29 RX ADMIN — MAGNESIUM SULFATE HEPTAHYDRATE 2 G: 40 INJECTION, SOLUTION INTRAVENOUS at 08:11

## 2023-11-29 RX ADMIN — INSULIN ASPART 6 UNITS: 100 INJECTION, SOLUTION INTRAVENOUS; SUBCUTANEOUS at 12:11

## 2023-11-29 NOTE — NURSING
Upon entering pts room, bedside nurse observed that pt had multiple fast food beverages/ beverage containers at bedside. Pt and pts  both educated about the PEC protocol of not having any outside food or beverages. All items removed from pts room.

## 2023-11-29 NOTE — SUBJECTIVE & OBJECTIVE
Interval History: NAEON. Last day of valium taper. Insulin titrated up prior to discharge, she has been eating food from outside.  to  patient this afternoon around 2:30-3 PM. Group intervention planned this evening and then patient to fly to [place] for rehab. Confirmed timeline with  this morning.    Review of Systems   Constitutional:  Negative for chills and fever.   HENT:  Negative for sore throat.    Respiratory:  Negative for shortness of breath and wheezing.    Cardiovascular:  Negative for chest pain and palpitations.   Gastrointestinal:  Negative for abdominal distention, abdominal pain, constipation, diarrhea, nausea and vomiting.   Genitourinary:  Negative for dysuria and flank pain.   Neurological:  Negative for tremors and seizures.   Psychiatric/Behavioral:  Negative for agitation, confusion and hallucinations. The patient is nervous/anxious.      Objective:     Vital Signs (Most Recent):  Temp: 97 °F (36.1 °C) (11/29/23 1037)  Pulse: 72 (11/29/23 1104)  Resp: 17 (11/29/23 1104)  BP: (!) 149/64 (11/29/23 1037)  SpO2: (!) 94 % (11/29/23 1104) Vital Signs (24h Range):  Temp:  [97 °F (36.1 °C)-98.1 °F (36.7 °C)] 97 °F (36.1 °C)  Pulse:  [72-93] 72  Resp:  [16-18] 17  SpO2:  [92 %-94 %] 94 %  BP: (131-149)/(61-72) 149/64     Weight: 86.3 kg (190 lb 4.1 oz)  Body mass index is 30.71 kg/m².  No intake or output data in the 24 hours ending 11/29/23 1152      Physical Exam  Vitals and nursing note reviewed.   HENT:      Head: Normocephalic and atraumatic.      Mouth/Throat:      Mouth: Mucous membranes are moist.      Pharynx: Oropharynx is clear.   Eyes:      Extraocular Movements: Extraocular movements intact.      Conjunctiva/sclera: Conjunctivae normal.   Cardiovascular:      Rate and Rhythm: Normal rate and regular rhythm.      Pulses: Normal pulses.   Pulmonary:      Effort: Pulmonary effort is normal. No respiratory distress.      Breath sounds: No wheezing or rales.    Abdominal:      Palpations: Abdomen is soft.      Tenderness: There is no abdominal tenderness. There is no guarding.   Musculoskeletal:      Cervical back: Normal range of motion.      Right lower leg: No edema.      Left lower leg: No edema.   Skin:     General: Skin is warm and dry.   Neurological:      General: No focal deficit present.      Mental Status: She is alert and oriented to person, place, and time.   Psychiatric:         Mood and Affect: Mood normal.             Significant Labs: All pertinent labs within the past 24 hours have been reviewed.    Significant Imaging: I have reviewed all pertinent imaging results/findings within the past 24 hours.

## 2023-11-29 NOTE — PLAN OF CARE
[facility] - Med Surg  Discharge Final Note    Primary Care Provider: Andrew Rodriguez MD    Expected Discharge Date: 11/29/2023    Final Discharge Note (most recent)       Final Note - 11/29/23 0833          Final Note    Assessment Type Final Discharge Note     Anticipated Discharge Disposition Home or Self Care     Hospital Resources/Appts/Education Provided Community resources provided        Post-Acute Status    Post-Acute Authorization Placement     Other Status Community Services     Discharge Delays None known at this time                     Important Message from Medicare

## 2023-11-29 NOTE — DISCHARGE SUMMARY
[facility] Medicine  Discharge Summary      Patient Name: Earl Abdul  MRN: [id number]  IZA: [id number]  Patient Class: IP- Inpatient  Admission Date: 2023  Hospital Length of Stay: 5 days  Discharge Date and Time: No discharge date for patient encounter.  Attending Physician: Kirk Goetz MD   Discharging Provider: Tahira Canela MD  Primary Care Provider: Andrew Rodriguez MD  Hospital Medicine Team: [facility] HOSP MED 4 Tahira Canela MD  Primary Care Team: [facility] 4    HPI:   Earl Abdul is a 63-year-old female with a medical history of depression, past suicide ideation and attempt, alcohol use disorder, alcohol withdrawl seizures, CLL not on treatment, DM2, COPD, HTN, and HLD who presented to [facility] ED on 23 after self-harm and found to be acutely intoxicated by alcohol.  She tells me that she has been very depressed lately due to missing her son who is .  She made superficial incisions to both wrists two days prior to admission.  She also stated that she had been drinking a lot of vodka.  States she is in pain all over her body and is worried about withdrawing from alcohol.  Denies chest pain, palpitations, abdominal pain, nausea, emesis, melena, and calf pain.  Denies audio and visual hallucinations and agitation.  Alert and oriented to person, place, and time during my examination.  Numerous prior admissions for similar episodes most recently  -  requiring a Valium taper.  Has been to residential rehab several times, attended AA meetings, completed IOP in the past.     In the ED, /55  RR 20 and afebrile with sats 93-96% on 2L.  Labs notable for WBC 34 Hgb 11.7 Plt 105 K 5.3 Bicarb 18 Gap 24 Glucose 59 AST 79 BHB 5.2 EtOH 279.  VBG showed pH 7.388 PCO2 37 PO2 118.  CXR showed no acute findings.  EKG showed sinus tachycardia with a rate 108bpm and QTc 436ms.  Received Duonebs, thiamine, folic acid, and nicotine patch.  She was placed  under a PEC.    The patient was admitted to Hospital Medicine for further workup and management.    * No surgery found *      Hospital Course:   PEC on 11/23/23.  Patient tremulous during withdrawal period, started on Valium q6h and CIWA precautions. Denies hallucinations or seizures, however patient's  states she had seizures 6 or 7 years ago. Patient's  also states that patient has not been compliant with home valium tapers in the past. Tapering valium while inpatient. Patient's  Henrique is concerned that if she is not under CEC, she will leave AMA and will have recurring SI and SA while intoxicated. Psychiatry is following, greatly appreciate recommendations.  CEC expires 12/8/23. Plan to DC after Valium taper completed with CEC lifted to facilitate patient's travel to [facility] for voluntary substance use rehab.     Goals of Care Treatment Preferences:  Code Status: Full Code    Health care agent: Spouse is KINA  Health care agent number: No value filed.    Interval History: NAEON. Last day of valium taper. Insulin titrated up prior to discharge, she has been eating food from outside.  to  patient this afternoon around 2:30-3 PM. Intervention planned this evening and then patient to fly to [place] for rehab. Confirmed timeline with  this morning.    Review of Systems   Constitutional:  Negative for chills and fever.   HENT:  Negative for sore throat.    Respiratory:  Negative for shortness of breath and wheezing.    Cardiovascular:  Negative for chest pain and palpitations.   Gastrointestinal:  Negative for abdominal distention, abdominal pain, constipation, diarrhea, nausea and vomiting.   Genitourinary:  Negative for dysuria and flank pain.   Neurological:  Negative for tremors and seizures.   Psychiatric/Behavioral:  Negative for agitation, confusion and hallucinations. The patient is nervous/anxious.      Objective:     Vital Signs (Most Recent):  Temp: 97 °F  (36.1 °C) (11/29/23 1037)  Pulse: 72 (11/29/23 1104)  Resp: 17 (11/29/23 1104)  BP: (!) 149/64 (11/29/23 1037)  SpO2: (!) 94 % (11/29/23 1104) Vital Signs (24h Range):  Temp:  [97 °F (36.1 °C)-98.1 °F (36.7 °C)] 97 °F (36.1 °C)  Pulse:  [72-93] 72  Resp:  [16-18] 17  SpO2:  [92 %-94 %] 94 %  BP: (131-149)/(61-72) 149/64     Weight: 86.3 kg (190 lb 4.1 oz)  Body mass index is 30.71 kg/m².  No intake or output data in the 24 hours ending 11/29/23 1152        Physical Exam  Vitals and nursing note reviewed.   HENT:      Head: Normocephalic and atraumatic.      Mouth/Throat:      Mouth: Mucous membranes are moist.      Pharynx: Oropharynx is clear.   Eyes:      Extraocular Movements: Extraocular movements intact.      Conjunctiva/sclera: Conjunctivae normal.   Cardiovascular:      Rate and Rhythm: Normal rate and regular rhythm.      Pulses: Normal pulses.   Pulmonary:      Effort: Pulmonary effort is normal. No respiratory distress.      Breath sounds: No wheezing or rales.   Abdominal:      Palpations: Abdomen is soft.      Tenderness: There is no abdominal tenderness. There is no guarding.   Musculoskeletal:      Cervical back: Normal range of motion.      Right lower leg: No edema.      Left lower leg: No edema.   Skin:     General: Skin is warm and dry.   Neurological:      General: No focal deficit present.      Mental Status: She is alert and oriented to person, place, and time.   Psychiatric:         Mood and Affect: Mood normal.             What is most important right now is to focus on curative/life-prolongation (regardless of treatment burdens).  Accordingly, we have decided that the best plan to meet the patient's goals includes continuing with treatment.      Consults:   Consults (From admission, onward)          Status Ordering Provider     Inpatient consult to Midline team  Once        Provider:  (Not yet assigned)    GAY Gauthier     Inpatient consult to Midline team  Once        Provider:   (Not yet assigned)    Completed VALERIY HERNANDEZ     Inpatient consult to Psychiatry  Once        Provider:  (Not yet assigned)    Completed MARTY BALBUENA            Psychiatric  Depression with anxiety  See Suicide attempt by cutting of wrist.    - Continue home escitalopram        Alcohol use disorder, severe, dependence  Reports drinking vodka with last drink day of admit.  EtOH 279.  High risk for withdrawal seizures as she has experienced these in the past.  Numerous prior admissions for similar episodes most recently 11/4 - 11/6 requiring a Valium taper.  Has been to residential rehab several times, attended AA meetings, completed IOP in the past.     Per patient's , patient has not been compliant with home valium tapers. Will taper while under CEC. Ongoing discussions with family regarding dispo planning.    - Valium taper with planned completion on AM of 11/29/23.  - Ativan 2mg q4h PRN for CIWA > 8  - CIWA checks  - Thiamine, folate, multivitamin supplementation  - Fall precautions  - Seizure precautions  - [place] on cessation  - Psych consulted, appreciate recs    Cardiac/Vascular  Essential hypertension  /55 on admit.  Concerned that withdrawal will exacerbate.  Home meds include clonidine, metoprolol, losartan, and HCTZ but patient not taking.    - Continue losartan  - F/u outpatient    Oncology  CLL (chronic lymphocytic leukemia)  Chronic and stable.  Not currently on therapy.  Followed by Dr. Montano.    - [facility]      Endocrine  Type 2 diabetes mellitus with hypoglycemia  A1c 5.5 on 11/4/23.  Takes Metformin at home.  Glucose 59 on admit.   Hyperglycemia notes during stay. Suspect hyperglycemia due to both increased PO intake and pancreas damage.    - Starting daily basal with TIDWM bolus. Dose increase in setting of increased po snack and meal intake.  Will discharge with insulin.  - POCT glucose  - Hypoglycemia precautions  - Diabetic diet  - Outpatient f/u      Final Active  Diagnoses:    Diagnosis Date Noted POA    PRINCIPAL PROBLEM:  Suicide attempt by cutting of wrist [X78.9XXA] 11/23/2023 Yes    Urinary retention [R33.9] 11/24/2023 Yes    SIRS (systemic inflammatory response syndrome) [R65.10] 11/24/2023 Yes    Hyperkalemia [E87.5] 11/23/2023 Yes    GERD (gastroesophageal reflux disease) [K21.9]  Yes     Chronic    CLL (chronic lymphocytic leukemia) [C91.10] 09/30/2022 Yes     Chronic    Alcoholic ketoacidosis [E87.29] 04/29/2022 Yes    Type 2 diabetes mellitus with hypoglycemia [E11.649] 11/30/2021 Yes    COPD (chronic obstructive pulmonary disease) [J44.9] 04/17/2021 Yes    Malignant neoplasm of central portion of right breast in female, estrogen receptor positive [C50.111, Z17.0] 11/18/2020 Not Applicable     Chronic    Thrombocytopenia [D69.6] 10/26/2019 Yes    Depression with anxiety [F41.8] 08/16/2017 Yes    Alcohol use disorder, severe, dependence [F10.20] 02/07/2014 Yes    Essential hypertension [I10] 02/07/2014 Yes     Chronic    Tobacco abuse [Z72.0] 02/07/2014 Yes     Chronic    Hyperlipidemia [E78.5] 11/30/2012 Yes     Chronic      Problems Resolved During this Admission:       Discharged Condition: stable    Disposition: Home to inpatient rehabilitation    Patient Instructions:   No discharge procedures on file.    Significant Diagnostic Studies: N/A    Pending Diagnostic Studies:       None           Medications:  Reconciled Home Medications:      Medication List        START taking these medications      blood sugar diagnostic Strp  Use to check blood glucose 4 times daily     blood-glucose meter Misc  Use to check blood glucose 4 times daily     dicyclomine 10 MG capsule  Commonly known as: BENTYL  Take 1 capsule (10 mg total) by mouth 2 (two) times daily.  Replaces: dicyclomine 20 mg tablet     folic acid 1 MG tablet  Commonly known as: FOLVITE  Take 1 tablet (1 mg total) by mouth once daily.  Start taking on: November 30, 2023     insulin lispro 100 unit/mL  "pen  Commonly known as: HumaLOG KwikPen Insulin  Inject 9 Units into the skin 3 (three) times daily with meals.     lancets Misc  Use to check blood glucose 4 times daily     LANTUS SOLOSTAR U-100 INSULIN glargine 100 units/mL SubQ pen  Generic drug: insulin  Inject 15 Units into the skin 2 (two) times a day.     multivitamin Tab  Take 1 tablet by mouth once daily.  Start taking on: November 30, 2023     pen needle, diabetic 32 gauge x 5/32" Ndle  Use to inject insulin 5 (five) times daily.     thiamine 100 MG tablet  Take 1 tablet (100 mg total) by mouth once daily.  Start taking on: November 30, 2023            CHANGE how you take these medications      baclofen 5 mg Tab tablet  Commonly known as: LIORESAL  Take 1 tablet (5 mg total) by mouth 3 (three) times daily.  What changed:   medication strength  how much to take  when to take this  reasons to take this     loperamide 2 mg capsule  Commonly known as: IMODIUM  Take 1 capsule (2 mg total) by mouth 4 (four) times daily as needed for Diarrhea.  What changed: reasons to take this     traZODone 100 MG tablet  Commonly known as: DESYREL  Take 2 tablets (200 mg total) by mouth every evening.  What changed:   medication strength  how much to take            CONTINUE taking these medications      EScitalopram oxalate 10 MG tablet  Commonly known as: LEXAPRO  Take 1 tablet (10 mg total) by mouth once daily.     levothyroxine 75 MCG tablet  Commonly known as: SYNTHROID  Take 1 tablet (75 mcg total) by mouth before breakfast.  Start taking on: November 30, 2023     losartan-hydrochlorothiazide 100-25 mg 100-25 mg per tablet  Commonly known as: HYZAAR  Take 1 tablet by mouth once daily.            STOP taking these medications      ARIPiprazole 5 MG Tab  Commonly known as: ABILIFY     BREO ELLIPTA 100-25 mcg/dose diskus inhaler  Generic drug: fluticasone furoate-vilanteroL     diclofenac 75 MG EC tablet  Commonly known as: VOLTAREN     dicyclomine 20 mg tablet  Commonly " known as: BENTYL  Replaced by: dicyclomine 10 MG capsule     doxepin 150 MG Cap  Commonly known as: SINEQUAN     gabapentin 600 MG tablet  Commonly known as: NEURONTIN     hydrOXYzine pamoate 25 MG Cap  Commonly known as: VISTARIL     metFORMIN 500 MG ER 24hr tablet  Commonly known as: GLUCOPHAGE-XR     methocarbamoL 750 MG Tab  Commonly known as: ROBAXIN     mirtazapine 30 MG tablet  Commonly known as: REMERON     naltrexone 50 mg tablet  Commonly known as: DEPADE     omeprazole 20 MG capsule  Commonly known as: PRILOSEC     ondansetron 8 MG Tbdl  Commonly known as: ZOFRAN-ODT     propranoloL 20 MG tablet  Commonly known as: INDERAL              Indwelling Lines/Drains at time of discharge:   Lines/Drains/Airways       None                   Time spent on the discharge of patient: 90 minutes         Tahira Canela MD  Department of Hospital Medicine  [facility]

## 2023-11-29 NOTE — PLAN OF CARE
Pt discharged to home via wheelchair with belongings and . IV removed. Discharge packet discussed with pt and pts . All questions answered. New meds delivered to bedside.     Pt belongings given to , pt had envelope with $200 cash and an [employer] card... was given to patient and  upon discharge

## 2023-11-29 NOTE — ASSESSMENT & PLAN NOTE
A1c 5.5 on 11/4/23.  Takes Metformin at home.  Glucose 59 on admit.   Hyperglycemia notes during stay. Suspect hyperglycemia due to both increased PO intake and pancreas damage.    - Starting daily basal with TIDWM bolus. Dose increase in setting of increased po snack and meal intake.  Will discharge with insulin.  - POCT glucose  - Hypoglycemia precautions  - Diabetic diet  - Outpatient f/u

## 2023-11-29 NOTE — PROGRESS NOTES
"CONSULTATION LIAISON PSYCHIATRY PROGRESS NOTE    Patient Name: Earl Abdul  MRN: [id number]  Patient Class: IP- Inpatient  Admission Date: 11/23/2023  Attending Physician: Kirk Goetz MD      SUBJECTIVE:   Earl Abdul is a 63 y.o. female with past psychiatric history of depression with SA in 2017, alcohol use disorder complicated by seizures, tobacco use disorder & past pertinent medical history of sarcoidosis, COPD, CLL, T2DM, HTN, HLD, fibromyalgia presents to the ED/admitted to the hospital for Suicide attempt by cutting of wrist and alcoholic ketoacidosis.     Psychiatry consulted for "PEC for SA; alcohol use disorder"    Chart reviewed and patient seen.    Patient seen laying in bed and does not appear to be in any acute distress. She reports mood as "good" and expresses plan to attend [facility] inpatient Substance Use treatment program in [place]. She reports her mood and sleep have improved with Lexapro and Trazodone. She reports desire to follow-up with psychiatry following completion of 30 day residential substance use treatment program. She denies passive or active SI and is able to contract for safety at this time.      Collateral with patient's permission:   Spoke with patient's , Henrique Abdul.   Reports plan for the patient to go to rehab at [facility] in [place] tomorrow morning at 0700. Reports he will be with the patient from discharge to admission to inpatient substance use treatment facility a will keep an eye on her. Discussed safety planning and strict return to ED precautions.    OBJECTIVE:    Mental Status Exam:  General Appearance: adequately groomed, dressed in hospital garb, lying in bed, appears older than stated age  Behavior: normal, cooperative, polite, appropriate eye-contact, under good behavioral control  Involuntary Movements and Motor Activity: no tics, no tremors, no akathisia, no dystonia, no evidence of tardive dyskinesia, tremor of bilateral upper " "extremities noted at rest  Gait and Station: unable to assess - patient lying down or seated  Speech and Language: normal rate, rhythm, volume, tone, and pitch  Mood: "good"  Affect: normal, euthymic, reactive, full-range, mood-congruent, appropriate to situation and context  Thought Process and Associations: linear, organized  Thought Content and Perceptions::  denies SI/HI/AVH  Sensorium and Orientation: grossly intact  Recent and Remote Memory: grossly intact  Attention and Concentration: grossly intact  Fund of Knowledge: grossly intact  Insight: intact, demonstrates awareness of illness and situation  Judgment: adequate, behavior is adequate/appropriate to circumstances, compliant with health provider's recommendations and instructions    CAM ICU positive? no    ASSESSMENT & RECOMMENDATIONS   Alcohol Use Disorder, Severe, w/ hx of complicated withdrawal seizures  Unspecified Depressive Disorder c/b SA v SI w/ SIB via cutting  R/o SIMD  R/o Prolonged Grief Disorder  R/o Adjustment disorder w/ depressed mood    PSYCH MEDICATIONS  Scheduled:   Lexapro 10mg daily   Trazodone 200mg nightly   11/23/23 EKG tachy 108bpm w/ QTc 436ms    Alcohol detox management:  Last drink 11/23/23; protocol below for 5-7 days after aforementioned date:  CIWA w/ VS check q4hrs while awake  Continue Valium taper- decrease to 5 mg today with plan for cessation at discharge.  PRN Ativan 2mg q6hrs for CIWA>8 or 2 of 3: SBP>160, DBP>100, HR>110. Please correlate with pt's concurrent underlying medical hx and clinical setting that may mimic elevated VS.  Daily Thiamine, Folate, and Multivitamins  Recommend higher level of substance abuse rehab beyond IOP; e.g. inpatient/residential rehab. Resources provided in discharge instructions.    RISK ASSESSMENT  Pt currently denying SI for several days now, can lift CEC once medically stable for discharge as patient is agreeable to go to inpatient substance use treatment program.  Discussed safety " planning and strict return to ED precautions with the patient's .    FOLLOW UP  Will follow up while in house    DISPOSITION - once medically cleared:  Patient may be discharged home with next of kin with outpatient psychaitric follow up in order to attend inpatient substance use treatment program.    Please contact ON CALL psychiatry service (24/7) for any acute issues that may arise.    Surekha Oleary MD  [facility] PGY-II   Psychiatry  [facility]    --------------------------------------------------------------------------------------------------------------------------------------------------------------------------------------------------------------------------------------    CONTINUED OBJECTIVE clinical data & findings reviewed and noted for above decision making    Current Medications:   Scheduled Meds:    baclofen  5 mg Oral TID    dicyclomine  10 mg Oral BID    enoxparin  40 mg Subcutaneous Daily    EScitalopram oxalate  10 mg Oral Daily    fluticasone furoate-vilanteroL  1 puff Inhalation Daily    folic acid  1 mg Oral Daily    gabapentin  600 mg Oral TID    hydroCHLOROthiazide  25 mg Oral Daily    insulin aspart U-100  9 Units Subcutaneous TIDWM    insulin detemir U-100  13 Units Subcutaneous BID    levothyroxine  75 mcg Oral Before breakfast    losartan  100 mg Oral Daily    magnesium sulfate IVPB  2 g Intravenous Once    melatonin  6 mg Oral Nightly    multivitamin  1 tablet Oral Daily    nicotine  1 patch Transdermal Daily    pantoprazole  40 mg Oral Daily    thiamine  100 mg Oral Daily    traZODone  200 mg Oral QHS     PRN Meds: acetaminophen, dextrose 10%, dextrose 10%, glucagon (human recombinant), glucose, glucose, insulin aspart U-100, loperamide, lorazepam, naloxone, ondansetron, simethicone, sodium chloride 0.9%    Allergies:   Review of patient's allergies indicates:   Allergen Reactions    Lortab [hydrocodone-acetaminophen] Itching    Promethazine  Itching and Other (See Comments)       Vitals  Vitals:    11/29/23 0848   BP: 134/63   Pulse:    Resp:    Temp:        Labs/Imaging/Studies:  Recent Results (from the past 24 hour(s))   POCT glucose    Collection Time: 11/28/23 11:15 AM   Result Value Ref Range    POCT Glucose 317 (H) 70 - 110 mg/dL   POCT glucose    Collection Time: 11/28/23  4:05 PM   Result Value Ref Range    POCT Glucose 336 (H) 70 - 110 mg/dL   POCT glucose    Collection Time: 11/28/23  7:34 PM   Result Value Ref Range    POCT Glucose 341 (H) 70 - 110 mg/dL   POCT glucose    Collection Time: 11/29/23 12:52 AM   Result Value Ref Range    POCT Glucose 417 (H) 70 - 110 mg/dL   POCT glucose    Collection Time: 11/29/23  2:05 AM   Result Value Ref Range    POCT Glucose 463 (HH) 70 - 110 mg/dL   POCT glucose    Collection Time: 11/29/23  4:01 AM   Result Value Ref Range    POCT Glucose 375 (H) 70 - 110 mg/dL   Comprehensive Metabolic Panel (CMP)    Collection Time: 11/29/23  6:09 AM   Result Value Ref Range    Sodium 133 (L) 136 - 145 mmol/L    Potassium 4.3 3.5 - 5.1 mmol/L    Chloride 95 95 - 110 mmol/L    CO2 26 23 - 29 mmol/L    Glucose 345 (H) 70 - 110 mg/dL    BUN 17 8 - 23 mg/dL    Creatinine 0.9 0.5 - 1.4 mg/dL    Calcium 9.3 8.7 - 10.5 mg/dL    Total Protein 6.1 6.0 - 8.4 g/dL    Albumin 3.3 (L) 3.5 - 5.2 g/dL    Total Bilirubin 0.4 0.1 - 1.0 mg/dL    Alkaline Phosphatase 60 55 - 135 U/L    AST 30 10 - 40 U/L    ALT 32 10 - 44 U/L    eGFR >60.0 >60 mL/min/1.73 m^2    Anion Gap 12 8 - 16 mmol/L   CBC with Automated Differential    Collection Time: 11/29/23  6:09 AM   Result Value Ref Range    WBC 5.61 3.90 - 12.70 K/uL    RBC 3.47 (L) 4.00 - 5.40 M/uL    Hemoglobin 9.1 (L) 12.0 - 16.0 g/dL    Hematocrit 29.6 (L) 37.0 - 48.5 %    MCV 85 82 - 98 fL    MCH 26.2 (L) 27.0 - 31.0 pg    MCHC 30.7 (L) 32.0 - 36.0 g/dL    RDW 17.7 (H) 11.5 - 14.5 %    Platelets 76 (L) 150 - 450 K/uL    MPV 12.6 9.2 - 12.9 fL    Immature Granulocytes 0.2 0.0 - 0.5 %     Gran # (ANC) 1.1 (L) 1.8 - 7.7 K/uL    Immature Grans (Abs) 0.01 0.00 - 0.04 K/uL    Lymph # 3.9 1.0 - 4.8 K/uL    Mono # 0.6 0.3 - 1.0 K/uL    Eos # 0.1 0.0 - 0.5 K/uL    Baso # 0.01 0.00 - 0.20 K/uL    nRBC 0 0 /100 WBC    Gran % 19.9 (L) 38.0 - 73.0 %    Lymph % 69.0 (H) 18.0 - 48.0 %    Mono % 9.8 4.0 - 15.0 %    Eosinophil % 0.9 0.0 - 8.0 %    Basophil % 0.2 0.0 - 1.9 %    Platelet Estimate Decreased (A)     Differential Method Automated    Magnesium    Collection Time: 11/29/23  6:09 AM   Result Value Ref Range    Magnesium 1.5 (L) 1.6 - 2.6 mg/dL   Phosphorus    Collection Time: 11/29/23  6:09 AM   Result Value Ref Range    Phosphorus 4.0 2.7 - 4.5 mg/dL   POCT glucose    Collection Time: 11/29/23  7:15 AM   Result Value Ref Range    POCT Glucose 289 (H) 70 - 110 mg/dL     Imaging Results              X-Ray Chest AP Portable (Final result)  Result time 11/23/23 20:02:18      Final result by Uche Montano MD (11/23/23 20:02:18)                   Impression:      No acute intrathoracic process.      Electronically signed by: Uche Montano MD  Date:    11/23/2023  Time:    20:02               Narrative:    EXAMINATION:  XR CHEST AP PORTABLE    CLINICAL HISTORY:  Hypoxemia    TECHNIQUE:  Single frontal view of the chest was performed.    COMPARISON:  11/14/2023.    FINDINGS:  There are postoperative changes in the right axillary region and the right chest wall.  Monitoring EKG leads are present.    The trachea is unremarkable.  There are calcifications of the aortic knob.  The cardiomediastinal silhouette is within normal limits.  There is no evidence of free air beneath the hemidiaphragms.  There are no pleural effusions.  There is no evidence of a pneumothorax.  There is no evidence of pneumomediastinum.  No airspace opacity is present.  There are degenerative changes in the osseous structures.

## 2023-11-30 ENCOUNTER — PATIENT OUTREACH (OUTPATIENT)
Dept: ADMINISTRATIVE | Facility: CLINIC | Age: 63
End: 2023-11-30
Payer: COMMERCIAL

## 2024-01-26 ENCOUNTER — PATIENT MESSAGE (OUTPATIENT)
Dept: ENDOCRINOLOGY | Facility: CLINIC | Age: 64
End: 2024-01-26
Payer: COMMERCIAL

## 2024-01-26 ENCOUNTER — PATIENT MESSAGE (OUTPATIENT)
Dept: INTERNAL MEDICINE | Facility: CLINIC | Age: 64
End: 2024-01-26
Payer: COMMERCIAL

## 2024-02-02 ENCOUNTER — HOSPITAL ENCOUNTER (INPATIENT)
Facility: HOSPITAL | Age: 64
LOS: 1 days | Discharge: LEFT BEFORE DISCHARGE | DRG: 894 | End: 2024-02-03
Attending: EMERGENCY MEDICINE | Admitting: INTERNAL MEDICINE
Payer: COMMERCIAL

## 2024-02-02 DIAGNOSIS — R10.9 ABDOMINAL PAIN, UNSPECIFIED ABDOMINAL LOCATION: Primary | ICD-10-CM

## 2024-02-02 DIAGNOSIS — D72.829 LEUKOCYTOSIS, UNSPECIFIED TYPE: ICD-10-CM

## 2024-02-02 DIAGNOSIS — E16.2 HYPOGLYCEMIA: ICD-10-CM

## 2024-02-02 DIAGNOSIS — R07.9 CHEST PAIN: ICD-10-CM

## 2024-02-02 DIAGNOSIS — F10.939 ALCOHOL WITHDRAWAL: ICD-10-CM

## 2024-02-02 DIAGNOSIS — R11.10 VOMITING: ICD-10-CM

## 2024-02-02 LAB
ALBUMIN SERPL BCP-MCNC: 4 G/DL (ref 3.5–5.2)
ALP SERPL-CCNC: 58 U/L (ref 55–135)
ALT SERPL W/O P-5'-P-CCNC: 30 U/L (ref 10–44)
ANION GAP SERPL CALC-SCNC: 15 MMOL/L (ref 8–16)
ANISOCYTOSIS BLD QL SMEAR: SLIGHT
AST SERPL-CCNC: 30 U/L (ref 10–40)
BASOPHILS NFR BLD: 0 % (ref 0–1.9)
BILIRUB SERPL-MCNC: 0.7 MG/DL (ref 0.1–1)
BUN SERPL-MCNC: 19 MG/DL (ref 8–23)
CALCIUM SERPL-MCNC: 9.1 MG/DL (ref 8.7–10.5)
CHLORIDE SERPL-SCNC: 105 MMOL/L (ref 95–110)
CO2 SERPL-SCNC: 20 MMOL/L (ref 23–29)
CREAT SERPL-MCNC: 0.7 MG/DL (ref 0.5–1.4)
DIFFERENTIAL METHOD BLD: ABNORMAL
EOSINOPHIL NFR BLD: 1 % (ref 0–8)
ERYTHROCYTE [DISTWIDTH] IN BLOOD BY AUTOMATED COUNT: 16.5 % (ref 11.5–14.5)
EST. GFR  (NO RACE VARIABLE): >60 ML/MIN/1.73 M^2
GLUCOSE SERPL-MCNC: 66 MG/DL (ref 70–110)
HCT VFR BLD AUTO: 34.9 % (ref 37–48.5)
HGB BLD-MCNC: 10.5 G/DL (ref 12–16)
IMM GRANULOCYTES # BLD AUTO: ABNORMAL K/UL (ref 0–0.04)
IMM GRANULOCYTES NFR BLD AUTO: ABNORMAL % (ref 0–0.5)
LIPASE SERPL-CCNC: 23 U/L (ref 4–60)
LYMPHOCYTES NFR BLD: 47 % (ref 18–48)
MCH RBC QN AUTO: 25.1 PG (ref 27–31)
MCHC RBC AUTO-ENTMCNC: 30.1 G/DL (ref 32–36)
MCV RBC AUTO: 84 FL (ref 82–98)
MONOCYTES NFR BLD: 4 % (ref 4–15)
NEUTROPHILS NFR BLD: 46 % (ref 38–73)
NEUTS BAND NFR BLD MANUAL: 2 %
NRBC BLD-RTO: 0 /100 WBC
PLATELET # BLD AUTO: 178 K/UL (ref 150–450)
PLATELET BLD QL SMEAR: ABNORMAL
PMV BLD AUTO: 9.7 FL (ref 9.2–12.9)
POCT GLUCOSE: 94 MG/DL (ref 70–110)
POLYCHROMASIA BLD QL SMEAR: ABNORMAL
POTASSIUM SERPL-SCNC: 4.1 MMOL/L (ref 3.5–5.1)
PROT SERPL-MCNC: 7 G/DL (ref 6–8.4)
RBC # BLD AUTO: 4.18 M/UL (ref 4–5.4)
SODIUM SERPL-SCNC: 140 MMOL/L (ref 136–145)
TROPONIN I SERPL DL<=0.01 NG/ML-MCNC: 0.01 NG/ML (ref 0–0.03)
WBC # BLD AUTO: 27.75 K/UL (ref 3.9–12.7)

## 2024-02-02 PROCEDURE — 93010 ELECTROCARDIOGRAM REPORT: CPT | Mod: ,,, | Performed by: INTERNAL MEDICINE

## 2024-02-02 PROCEDURE — 85027 COMPLETE CBC AUTOMATED: CPT | Performed by: EMERGENCY MEDICINE

## 2024-02-02 PROCEDURE — 96374 THER/PROPH/DIAG INJ IV PUSH: CPT | Mod: 59

## 2024-02-02 PROCEDURE — 93005 ELECTROCARDIOGRAM TRACING: CPT

## 2024-02-02 PROCEDURE — 85007 BL SMEAR W/DIFF WBC COUNT: CPT | Performed by: EMERGENCY MEDICINE

## 2024-02-02 PROCEDURE — 82962 GLUCOSE BLOOD TEST: CPT

## 2024-02-02 PROCEDURE — 25000003 PHARM REV CODE 250: Performed by: EMERGENCY MEDICINE

## 2024-02-02 PROCEDURE — 96365 THER/PROPH/DIAG IV INF INIT: CPT

## 2024-02-02 PROCEDURE — 63600175 PHARM REV CODE 636 W HCPCS: Performed by: EMERGENCY MEDICINE

## 2024-02-02 PROCEDURE — 80053 COMPREHEN METABOLIC PANEL: CPT | Performed by: EMERGENCY MEDICINE

## 2024-02-02 PROCEDURE — 83690 ASSAY OF LIPASE: CPT | Performed by: EMERGENCY MEDICINE

## 2024-02-02 PROCEDURE — 96375 TX/PRO/DX INJ NEW DRUG ADDON: CPT | Mod: 59

## 2024-02-02 PROCEDURE — 96361 HYDRATE IV INFUSION ADD-ON: CPT | Mod: 59

## 2024-02-02 PROCEDURE — 84484 ASSAY OF TROPONIN QUANT: CPT | Performed by: EMERGENCY MEDICINE

## 2024-02-02 RX ORDER — ONDANSETRON HYDROCHLORIDE 2 MG/ML
4 INJECTION, SOLUTION INTRAVENOUS
Status: COMPLETED | OUTPATIENT
Start: 2024-02-02 | End: 2024-02-02

## 2024-02-02 RX ORDER — IBUPROFEN 200 MG
16 TABLET ORAL
Status: DISCONTINUED | OUTPATIENT
Start: 2024-02-02 | End: 2024-02-03 | Stop reason: HOSPADM

## 2024-02-02 RX ORDER — DROPERIDOL 2.5 MG/ML
0.62 INJECTION, SOLUTION INTRAMUSCULAR; INTRAVENOUS ONCE
Status: COMPLETED | OUTPATIENT
Start: 2024-02-03 | End: 2024-02-02

## 2024-02-02 RX ORDER — LORAZEPAM 2 MG/ML
1 INJECTION INTRAMUSCULAR
Status: COMPLETED | OUTPATIENT
Start: 2024-02-02 | End: 2024-02-02

## 2024-02-02 RX ORDER — IBUPROFEN 200 MG
24 TABLET ORAL
Status: DISCONTINUED | OUTPATIENT
Start: 2024-02-02 | End: 2024-02-03 | Stop reason: HOSPADM

## 2024-02-02 RX ORDER — GLUCAGON 1 MG
1 KIT INJECTION
Status: DISCONTINUED | OUTPATIENT
Start: 2024-02-02 | End: 2024-02-03 | Stop reason: HOSPADM

## 2024-02-02 RX ADMIN — LORAZEPAM 1 MG: 2 INJECTION INTRAMUSCULAR; INTRAVENOUS at 09:02

## 2024-02-02 RX ADMIN — ONDANSETRON 4 MG: 2 INJECTION INTRAMUSCULAR; INTRAVENOUS at 09:02

## 2024-02-02 RX ADMIN — DROPERIDOL 0.62 MG: 2.5 INJECTION, SOLUTION INTRAMUSCULAR; INTRAVENOUS at 11:02

## 2024-02-02 RX ADMIN — SODIUM CHLORIDE 1000 ML: 9 INJECTION, SOLUTION INTRAVENOUS at 10:02

## 2024-02-03 VITALS
DIASTOLIC BLOOD PRESSURE: 65 MMHG | SYSTOLIC BLOOD PRESSURE: 146 MMHG | RESPIRATION RATE: 19 BRPM | TEMPERATURE: 98 F | OXYGEN SATURATION: 89 % | HEART RATE: 91 BPM

## 2024-02-03 LAB
ALBUMIN SERPL BCP-MCNC: 3.7 G/DL (ref 3.5–5.2)
ALBUMIN SERPL BCP-MCNC: 3.9 G/DL (ref 3.5–5.2)
ALP SERPL-CCNC: 50 U/L (ref 55–135)
ALP SERPL-CCNC: 54 U/L (ref 55–135)
ALT SERPL W/O P-5'-P-CCNC: 27 U/L (ref 10–44)
ALT SERPL W/O P-5'-P-CCNC: 30 U/L (ref 10–44)
ANION GAP SERPL CALC-SCNC: 10 MMOL/L (ref 8–16)
ANION GAP SERPL CALC-SCNC: 11 MMOL/L (ref 8–16)
ANISOCYTOSIS BLD QL SMEAR: SLIGHT
AST SERPL-CCNC: 31 U/L (ref 10–40)
AST SERPL-CCNC: 35 U/L (ref 10–40)
BASOPHILS # BLD AUTO: 0.03 K/UL (ref 0–0.2)
BASOPHILS # BLD AUTO: 0.06 K/UL (ref 0–0.2)
BASOPHILS NFR BLD: 0.2 % (ref 0–1.9)
BASOPHILS NFR BLD: 0.3 % (ref 0–1.9)
BILIRUB SERPL-MCNC: 0.7 MG/DL (ref 0.1–1)
BILIRUB SERPL-MCNC: 0.8 MG/DL (ref 0.1–1)
BUN SERPL-MCNC: 14 MG/DL (ref 8–23)
BUN SERPL-MCNC: 15 MG/DL (ref 8–23)
CALCIUM SERPL-MCNC: 8.8 MG/DL (ref 8.7–10.5)
CALCIUM SERPL-MCNC: 8.8 MG/DL (ref 8.7–10.5)
CHLORIDE SERPL-SCNC: 105 MMOL/L (ref 95–110)
CHLORIDE SERPL-SCNC: 106 MMOL/L (ref 95–110)
CO2 SERPL-SCNC: 19 MMOL/L (ref 23–29)
CO2 SERPL-SCNC: 22 MMOL/L (ref 23–29)
CREAT SERPL-MCNC: 0.9 MG/DL (ref 0.5–1.4)
CREAT SERPL-MCNC: 0.9 MG/DL (ref 0.5–1.4)
DIFFERENTIAL METHOD BLD: ABNORMAL
DIFFERENTIAL METHOD BLD: ABNORMAL
EOSINOPHIL # BLD AUTO: 0 K/UL (ref 0–0.5)
EOSINOPHIL # BLD AUTO: 0 K/UL (ref 0–0.5)
EOSINOPHIL NFR BLD: 0.1 % (ref 0–8)
EOSINOPHIL NFR BLD: 0.2 % (ref 0–8)
ERYTHROCYTE [DISTWIDTH] IN BLOOD BY AUTOMATED COUNT: 16.3 % (ref 11.5–14.5)
ERYTHROCYTE [DISTWIDTH] IN BLOOD BY AUTOMATED COUNT: 16.6 % (ref 11.5–14.5)
EST. GFR  (NO RACE VARIABLE): >60 ML/MIN/1.73 M^2
EST. GFR  (NO RACE VARIABLE): >60 ML/MIN/1.73 M^2
GLUCOSE SERPL-MCNC: 82 MG/DL (ref 70–110)
GLUCOSE SERPL-MCNC: 82 MG/DL (ref 70–110)
HCT VFR BLD AUTO: 30 % (ref 37–48.5)
HCT VFR BLD AUTO: 33.6 % (ref 37–48.5)
HGB BLD-MCNC: 10.4 G/DL (ref 12–16)
HGB BLD-MCNC: 9.1 G/DL (ref 12–16)
IMM GRANULOCYTES # BLD AUTO: 0.08 K/UL (ref 0–0.04)
IMM GRANULOCYTES # BLD AUTO: 0.11 K/UL (ref 0–0.04)
IMM GRANULOCYTES NFR BLD AUTO: 0.5 % (ref 0–0.5)
IMM GRANULOCYTES NFR BLD AUTO: 0.5 % (ref 0–0.5)
LACTATE SERPL-SCNC: 0.6 MMOL/L (ref 0.5–2.2)
LYMPHOCYTES # BLD AUTO: 10.3 K/UL (ref 1–4.8)
LYMPHOCYTES # BLD AUTO: 16 K/UL (ref 1–4.8)
LYMPHOCYTES NFR BLD: 63.8 % (ref 18–48)
LYMPHOCYTES NFR BLD: 67 % (ref 18–48)
MAGNESIUM SERPL-MCNC: 1.6 MG/DL (ref 1.6–2.6)
MCH RBC QN AUTO: 25.3 PG (ref 27–31)
MCH RBC QN AUTO: 25.6 PG (ref 27–31)
MCHC RBC AUTO-ENTMCNC: 30.3 G/DL (ref 32–36)
MCHC RBC AUTO-ENTMCNC: 31 G/DL (ref 32–36)
MCV RBC AUTO: 83 FL (ref 82–98)
MCV RBC AUTO: 84 FL (ref 82–98)
MONOCYTES # BLD AUTO: 0.9 K/UL (ref 0.3–1)
MONOCYTES # BLD AUTO: 1.1 K/UL (ref 0.3–1)
MONOCYTES NFR BLD: 4.7 % (ref 4–15)
MONOCYTES NFR BLD: 5.4 % (ref 4–15)
NEUTROPHILS # BLD AUTO: 4.8 K/UL (ref 1.8–7.7)
NEUTROPHILS # BLD AUTO: 6.5 K/UL (ref 1.8–7.7)
NEUTROPHILS NFR BLD: 27.3 % (ref 38–73)
NEUTROPHILS NFR BLD: 30 % (ref 38–73)
NRBC BLD-RTO: 0 /100 WBC
NRBC BLD-RTO: 0 /100 WBC
PHOSPHATE SERPL-MCNC: 2.6 MG/DL (ref 2.7–4.5)
PLATELET # BLD AUTO: 137 K/UL (ref 150–450)
PLATELET # BLD AUTO: 239 K/UL (ref 150–450)
PLATELET BLD QL SMEAR: ABNORMAL
PMV BLD AUTO: 10 FL (ref 9.2–12.9)
PMV BLD AUTO: 11.7 FL (ref 9.2–12.9)
POTASSIUM SERPL-SCNC: 3.9 MMOL/L (ref 3.5–5.1)
POTASSIUM SERPL-SCNC: 3.9 MMOL/L (ref 3.5–5.1)
PROCALCITONIN SERPL IA-MCNC: 0.05 NG/ML
PROT SERPL-MCNC: 6.3 G/DL (ref 6–8.4)
PROT SERPL-MCNC: 6.6 G/DL (ref 6–8.4)
RBC # BLD AUTO: 3.59 M/UL (ref 4–5.4)
RBC # BLD AUTO: 4.06 M/UL (ref 4–5.4)
SODIUM SERPL-SCNC: 136 MMOL/L (ref 136–145)
SODIUM SERPL-SCNC: 137 MMOL/L (ref 136–145)
WBC # BLD AUTO: 16.18 K/UL (ref 3.9–12.7)
WBC # BLD AUTO: 23.8 K/UL (ref 3.9–12.7)

## 2024-02-03 PROCEDURE — 83735 ASSAY OF MAGNESIUM: CPT | Performed by: INTERNAL MEDICINE

## 2024-02-03 PROCEDURE — 87040 BLOOD CULTURE FOR BACTERIA: CPT

## 2024-02-03 PROCEDURE — 83605 ASSAY OF LACTIC ACID: CPT

## 2024-02-03 PROCEDURE — 25000003 PHARM REV CODE 250: Performed by: EMERGENCY MEDICINE

## 2024-02-03 PROCEDURE — 84100 ASSAY OF PHOSPHORUS: CPT | Performed by: INTERNAL MEDICINE

## 2024-02-03 PROCEDURE — 84145 PROCALCITONIN (PCT): CPT

## 2024-02-03 PROCEDURE — 25500020 PHARM REV CODE 255: Performed by: EMERGENCY MEDICINE

## 2024-02-03 PROCEDURE — 63600175 PHARM REV CODE 636 W HCPCS: Performed by: EMERGENCY MEDICINE

## 2024-02-03 PROCEDURE — 63600175 PHARM REV CODE 636 W HCPCS

## 2024-02-03 PROCEDURE — 25000003 PHARM REV CODE 250

## 2024-02-03 PROCEDURE — 20000000 HC ICU ROOM

## 2024-02-03 PROCEDURE — 85025 COMPLETE CBC W/AUTO DIFF WBC: CPT | Mod: 91

## 2024-02-03 PROCEDURE — 63600175 PHARM REV CODE 636 W HCPCS: Performed by: INTERNAL MEDICINE

## 2024-02-03 PROCEDURE — 96376 TX/PRO/DX INJ SAME DRUG ADON: CPT

## 2024-02-03 PROCEDURE — 80053 COMPREHEN METABOLIC PANEL: CPT | Performed by: INTERNAL MEDICINE

## 2024-02-03 PROCEDURE — 99285 EMERGENCY DEPT VISIT HI MDM: CPT | Mod: 25

## 2024-02-03 PROCEDURE — 96375 TX/PRO/DX INJ NEW DRUG ADDON: CPT

## 2024-02-03 PROCEDURE — 85025 COMPLETE CBC W/AUTO DIFF WBC: CPT

## 2024-02-03 RX ORDER — NALOXONE HCL 0.4 MG/ML
0.02 VIAL (ML) INJECTION
Status: DISCONTINUED | OUTPATIENT
Start: 2024-02-03 | End: 2024-02-03 | Stop reason: HOSPADM

## 2024-02-03 RX ORDER — DIAZEPAM 10 MG/2ML
20 INJECTION INTRAMUSCULAR
Status: COMPLETED | OUTPATIENT
Start: 2024-02-03 | End: 2024-02-03

## 2024-02-03 RX ORDER — IBUPROFEN 200 MG
16 TABLET ORAL
Status: DISCONTINUED | OUTPATIENT
Start: 2024-02-03 | End: 2024-02-03 | Stop reason: HOSPADM

## 2024-02-03 RX ORDER — ENOXAPARIN SODIUM 100 MG/ML
40 INJECTION SUBCUTANEOUS EVERY 24 HOURS
Status: DISCONTINUED | OUTPATIENT
Start: 2024-02-03 | End: 2024-02-03 | Stop reason: HOSPADM

## 2024-02-03 RX ORDER — ONDANSETRON HYDROCHLORIDE 2 MG/ML
8 INJECTION, SOLUTION INTRAVENOUS EVERY 8 HOURS PRN
Status: DISCONTINUED | OUTPATIENT
Start: 2024-02-03 | End: 2024-02-03 | Stop reason: HOSPADM

## 2024-02-03 RX ORDER — SODIUM,POTASSIUM PHOSPHATES 280-250MG
1 POWDER IN PACKET (EA) ORAL ONCE
Status: CANCELLED | OUTPATIENT
Start: 2024-02-03 | End: 2024-02-03

## 2024-02-03 RX ORDER — DIAZEPAM 10 MG/2ML
10 INJECTION INTRAMUSCULAR
Status: COMPLETED | OUTPATIENT
Start: 2024-02-03 | End: 2024-02-03

## 2024-02-03 RX ORDER — INSULIN ASPART 100 [IU]/ML
0-5 INJECTION, SOLUTION INTRAVENOUS; SUBCUTANEOUS
Status: DISCONTINUED | OUTPATIENT
Start: 2024-02-03 | End: 2024-02-03 | Stop reason: HOSPADM

## 2024-02-03 RX ORDER — IBUPROFEN 200 MG
24 TABLET ORAL
Status: DISCONTINUED | OUTPATIENT
Start: 2024-02-03 | End: 2024-02-03 | Stop reason: HOSPADM

## 2024-02-03 RX ORDER — LEVOTHYROXINE SODIUM 75 UG/1
75 TABLET ORAL
Status: DISCONTINUED | OUTPATIENT
Start: 2024-02-03 | End: 2024-02-03 | Stop reason: HOSPADM

## 2024-02-03 RX ORDER — LOSARTAN POTASSIUM AND HYDROCHLOROTHIAZIDE 25; 100 MG/1; MG/1
1 TABLET ORAL DAILY
Status: DISCONTINUED | OUTPATIENT
Start: 2024-02-03 | End: 2024-02-03 | Stop reason: HOSPADM

## 2024-02-03 RX ORDER — GLUCAGON 1 MG
1 KIT INJECTION
Status: DISCONTINUED | OUTPATIENT
Start: 2024-02-03 | End: 2024-02-03 | Stop reason: HOSPADM

## 2024-02-03 RX ORDER — THIAMINE HCL 100 MG
100 TABLET ORAL DAILY
Status: DISCONTINUED | OUTPATIENT
Start: 2024-02-03 | End: 2024-02-03 | Stop reason: HOSPADM

## 2024-02-03 RX ORDER — ONDANSETRON HYDROCHLORIDE 4 MG/5ML
8 SOLUTION ORAL EVERY 8 HOURS PRN
Status: DISCONTINUED | OUTPATIENT
Start: 2024-02-03 | End: 2024-02-03

## 2024-02-03 RX ORDER — DIPHENHYDRAMINE HYDROCHLORIDE 50 MG/ML
25 INJECTION INTRAMUSCULAR; INTRAVENOUS
Status: COMPLETED | OUTPATIENT
Start: 2024-02-03 | End: 2024-02-03

## 2024-02-03 RX ORDER — HALOPERIDOL 5 MG/ML
5 INJECTION INTRAMUSCULAR
Status: DISCONTINUED | OUTPATIENT
Start: 2024-02-03 | End: 2024-02-03

## 2024-02-03 RX ORDER — FOLIC ACID 1 MG/1
1 TABLET ORAL DAILY
Status: DISCONTINUED | OUTPATIENT
Start: 2024-02-03 | End: 2024-02-03 | Stop reason: HOSPADM

## 2024-02-03 RX ORDER — HALOPERIDOL 5 MG/ML
5 INJECTION INTRAMUSCULAR
Status: COMPLETED | OUTPATIENT
Start: 2024-02-03 | End: 2024-02-03

## 2024-02-03 RX ORDER — ESCITALOPRAM OXALATE 10 MG/1
10 TABLET ORAL DAILY
Status: DISCONTINUED | OUTPATIENT
Start: 2024-02-03 | End: 2024-02-03 | Stop reason: HOSPADM

## 2024-02-03 RX ADMIN — PHENOBARBITAL SODIUM 130 MG: 130 INJECTION INTRAMUSCULAR at 10:02

## 2024-02-03 RX ADMIN — LEVOTHYROXINE SODIUM 75 MCG: 75 TABLET ORAL at 06:02

## 2024-02-03 RX ADMIN — DIAZEPAM 20 MG: 5 INJECTION, SOLUTION INTRAMUSCULAR; INTRAVENOUS at 01:02

## 2024-02-03 RX ADMIN — ESCITALOPRAM OXALATE 10 MG: 10 TABLET ORAL at 08:02

## 2024-02-03 RX ADMIN — FOLIC ACID 1 MG: 1 TABLET ORAL at 08:02

## 2024-02-03 RX ADMIN — PHENOBARBITAL SODIUM 260 MG: 130 INJECTION INTRAMUSCULAR at 03:02

## 2024-02-03 RX ADMIN — IOHEXOL 100 ML: 350 INJECTION, SOLUTION INTRAVENOUS at 01:02

## 2024-02-03 RX ADMIN — LOSARTAN POTASSIUM AND HYDROCHLOROTHIAZIDE 1 TABLET: 100; 25 TABLET, FILM COATED ORAL at 08:02

## 2024-02-03 RX ADMIN — DIAZEPAM 20 MG: 5 INJECTION, SOLUTION INTRAMUSCULAR; INTRAVENOUS at 05:02

## 2024-02-03 RX ADMIN — THIAMINE HYDROCHLORIDE 500 MG: 100 INJECTION, SOLUTION INTRAMUSCULAR; INTRAVENOUS at 03:02

## 2024-02-03 RX ADMIN — Medication 100 MG: at 08:02

## 2024-02-03 RX ADMIN — HALOPERIDOL LACTATE 5 MG: 5 INJECTION, SOLUTION INTRAMUSCULAR at 02:02

## 2024-02-03 RX ADMIN — DIAZEPAM 10 MG: 5 INJECTION, SOLUTION INTRAMUSCULAR; INTRAVENOUS at 12:02

## 2024-02-03 RX ADMIN — DIPHENHYDRAMINE HYDROCHLORIDE 25 MG: 50 INJECTION, SOLUTION INTRAMUSCULAR; INTRAVENOUS at 12:02

## 2024-02-03 RX ADMIN — DIAZEPAM 20 MG: 5 INJECTION, SOLUTION INTRAMUSCULAR; INTRAVENOUS at 03:02

## 2024-02-03 RX ADMIN — ONDANSETRON 8 MG: 2 INJECTION INTRAMUSCULAR; INTRAVENOUS at 09:02

## 2024-02-03 RX ADMIN — THERA TABS 1 TABLET: TAB at 08:02

## 2024-02-03 NOTE — ASSESSMENT & PLAN NOTE
Received 1mg Ativan, 30mg Valium, 260mg phenobarbital in the ED  - [place] protocols   - 130mg phenobarbital PRN

## 2024-02-03 NOTE — H&P
[facility] - Emergency Dept  Critical Care Medicine  History & Physical    Patient Name: Earl Abdul  MRN: [id number]  Admission Date: 2/2/2024  Hospital Length of Stay: 0 days  Code Status: Full Code  Attending Physician: Fawn Garrido MD   Primary Care Provider: Andrew Rodriguez MD   Principal Problem: <principal problem not specified>    Subjective:     HPI:  Ms Earl Abdul is a 62yo Female with PMHx of alcohol use disorder with hx of prior seizures, alcohol withdrawal, depression with suicide attempt 2017, non-insulin dependent DM, COPD, GERD, fibromyalgia, sarcoidosis, breast Ca, CLL (no current treatment), HTN, HLD, hypothyroidism.     She presented to the ED tonight with complaint of nausea, vomiting, generalized abdominal pain, SOB which started yesterday morning. Now her primary complaint is restless legs. She reports daily alcohol use, last drink was yesterday. She also endorses a history of alcohol withdrawals, and reports her symptoms now are similar to previous withdrawal symptoms. Her last BM was yesterday, she denies fever, chills, chest pain, urinary symptoms, cough, congestion, runny nose, HA, seizures. She denies current therapy for her CLL.     In the ED, she was noted to have confusion, hand tremor, tachycardia. Workup notable for hypoglycemia which improved with drinking juice. She also had a leukocytosis of 27.5, mild anemia. Lipase normal. CT chest/abd/pelvis with no acute abnormalities identified but it did demonstrate nolvia hepatis and external iliac chain lymphadenopathy in keeping with known CLL as well as hepatic steatosis.     She was given 1mg ativan, a total of 30mg Valium, 0.625 of droperidol, 5mg Haldol in the ED without resolution of withdrawal symptoms so was then given 260mg phenobarbital.     MICU consulted for further treatment of withdrawals and careful monitoring due to high doses of medications with risk of respiratory depression.     Hospital/ICU  Course:  No notes on file     Past Medical History:   Diagnosis Date    Alcoholism     c/b alcohol withdrawl seizures 7/2017    Anemia     Cancer of breast 10/2020    s/p bilateral mastectomy for  T1b N0 stage IA breast cancer October 2020    CLL (chronic lymphocytic leukemia) 09/30/2022 6/22/22 - PB flow cytometry  Immunophenotyping of peripheral blood detects a distinct kappa light chain restricted monoclonal B-cell population  (calculated at 2.44x10 9 /L, from the most recent CBC showing a total WBC of  7.35 K/uL with 61% total lymphocytes)  with a CLL phenotype (coexpression of CD19, CD5, CD23 and dim CD20). CD22 (FITC), FMC-7 and CD38 are negative in this population.    Controlled type 2 diabetes mellitus without complication, without long-term current use of insulin 11/30/2021    COPD (chronic obstructive pulmonary disease)     Depression     Diverticulitis     Fatty liver     GERD (gastroesophageal reflux disease)     Hyperlipidemia     Hypertension     Pancreatitis     Peptic ulcer disease     Polysubstance abuse     Posterior reversible encephalopathy syndrome     Sarcoidosis of lung     over 30 yrs ago    Suicide attempt        Past Surgical History:   Procedure Laterality Date    APPENDECTOMY      BILATERAL MASTECTOMY Bilateral 10/29/2020    Procedure: MASTECTOMY, BILATERAL;  Surgeon: Baylee Kevin MD;  Location: [address];  Service: General;  Laterality: Bilateral;    BREAST REVISION SURGERY Bilateral 2/11/2021    Procedure: BREAST REVISION SURGERY;  Surgeon: Scottie Johnson MD;  Location: [address];  Service: Plastics;  Laterality: Bilateral;    COLONOSCOPY N/A 7/28/2017    Procedure: COLONOSCOPY;  Surgeon: Aaron Alvarado MD;  Location: [facility];  Service: Endoscopy;  Laterality: N/A;    ESOPHAGOGASTRODUODENOSCOPY  10/7/2016, 11/6/2014    2016 - gastritis, duodenitis, 2014 erosive gastritis    ESOPHAGOGASTRODUODENOSCOPY N/A 2/11/2020    Procedure: ESOPHAGOGASTRODUODENOSCOPY  (EGD);  Surgeon: Fawn Garrido MD;  Location: [facility] ENDO;  Service: Endoscopy;  Laterality: N/A;    ESOPHAGOGASTRODUODENOSCOPY N/A 4/19/2021    Procedure: EGD (ESOPHAGOGASTRODUODENOSCOPY);  Surgeon: Paramjit Martino MD;  Location: [facility] ENDO;  Service: Endoscopy;  Laterality: N/A;    FLEXIBLE SIGMOIDOSCOPY  11/06/2014    colitis    HYSTERECTOMY      IMPLANTATION OF PERMANENT SACRAL NERVE STIMULATOR N/A 7/12/2022    Procedure: INSERTION, NEUROSTIMULATOR, PERMANENT, SACRAL;  Surgeon: Juaquin Edwards MD;  Location: [facility] OR [address] [place] [address];  Service: Urology;  Laterality: N/A;  1hr    INJECTION FOR SENTINEL NODE IDENTIFICATION Right 10/29/2020    Procedure: INJECTION, FOR SENTINEL NODE IDENTIFICATION;  Surgeon: Baylee Kevin MD;  Location: [address];  Service: General;  Laterality: Right;    INJECTION OF JOINT Right 10/10/2019    Procedure: Injection, Joint RIGHT ILIOPSOAS BURSA/TENDON INJECTION AND RIGHT GLUTEAL TENDON INJECTION WITH STEROID AND LIDOCAINE;  Surgeon: Guillaume Rico MD;  Location: [facility] PAIN MGT;  Service: Pain Management;  Laterality: Right;  NEEDS CONSENT    INSERTION OF BREAST TISSUE EXPANDER Bilateral 10/29/2020    Procedure: INSERTION, TISSUE EXPANDER, BREAST;  Surgeon: Scottie Johnson MD;  Location: [address];  Service: Plastics;  Laterality: Bilateral;  Right breast: 1082 g  Left breast: 1076 g    LIPOSUCTION Bilateral 2/11/2021    Procedure: LIPOSUCTION;  Surgeon: Scottie Johnson MD;  Location: [address];  Service: Plastics;  Laterality: Bilateral;    mediastenoscopy      REPLACEMENT OF IMPLANT OF BREAST Bilateral 2/11/2021    Procedure: REPLACEMENT, IMPLANT, BREAST;  Surgeon: Scottie Johnson MD;  Location: [address];  Service: Plastics;  Laterality: Bilateral;    SENTINEL LYMPH NODE BIOPSY Right 10/29/2020    Procedure: BIOPSY, LYMPH NODE, SENTINEL;  Surgeon: Baylee Kevin MD;  Location: [address];  Service: General;  Laterality: Right;     TONSILLECTOMY N/A 1970    TUBAL LIGATION         Review of patient's allergies indicates:   Allergen Reactions    Lortab [hydrocodone-acetaminophen] Itching    Promethazine Itching and Other (See Comments)       Family History       Problem Relation (Age of Onset)    Breast cancer Maternal Aunt, Daughter    Colon cancer Maternal Uncle    Diabetes Father, Mother    Heart attack Father    Hypertension Father, Mother          Tobacco Use    Smoking status: Every Day     Current packs/day: 0.00     Average packs/day: 0.5 packs/day for 30.0 years (15.0 ttl pk-yrs)     Types: Vaping with nicotine, Cigarettes     Start date: 2/1/1991     Last attempt to quit: 2/1/2021     Years since quitting: 3.0    Smokeless tobacco: Never    Tobacco comments:     Patient is currently smoking 10 cigarettes a day, declines nicotine patches   Substance and Sexual Activity    Alcohol use: Yes     Comment: vodka daily (half a regular bottle) for 4 days    Drug use: Yes     Types: Marijuana     Comment: gummies    Sexual activity: Yes     Birth control/protection: Surgical      Review of Systems   Constitutional:  Negative for chills and fever.   HENT:  Negative for congestion.    Eyes:  Negative for visual disturbance.   Respiratory:  Positive for shortness of breath. Negative for cough.    Cardiovascular:  Negative for chest pain.   Gastrointestinal:  Positive for abdominal pain, nausea and vomiting. Negative for diarrhea.   Genitourinary:  Negative for difficulty urinating and dysuria.   Musculoskeletal:  Negative for myalgias.   Skin:  Negative for wound.   Neurological:  Positive for tremors. Negative for seizures.   Psychiatric/Behavioral:  Positive for agitation and confusion.      Objective:     Vital Signs (Most Recent):  Temp: 97.9 °F (36.6 °C) (02/02/24 2042)  Pulse: (!) 118 (02/03/24 0312)  Resp: 14 (02/03/24 0312)  BP: 139/70 (02/03/24 0312)  SpO2: (!) 90 % (02/03/24 0312) Vital Signs (24h Range):  Temp:  [97.9 °F (36.6 °C)] 97.9  °F (36.6 °C)  Pulse:  [] 118  Resp:  [14-18] 14  SpO2:  [90 %-97 %] 90 %  BP: (133-178)/(70-89) 139/70      There is no height or weight on file to calculate BMI.    No intake or output data in the 24 hours ending 02/03/24 1952       Physical Exam  Vitals and nursing note reviewed.   Constitutional:       General: She is not in acute distress.     Appearance: Normal appearance. She is normal weight. She is not diaphoretic.   HENT:      Head: Normocephalic and atraumatic.      Right Ear: External ear normal.      Left Ear: External ear normal.      Nose: Nose normal.      Mouth/Throat:      Mouth: Mucous membranes are moist.      Pharynx: Oropharynx is clear.      Comments: No tongue fasciculations noted   Eyes:      General:         Right eye: No discharge.         Left eye: No discharge.      Extraocular Movements: Extraocular movements intact.      Conjunctiva/sclera: Conjunctivae normal.      Pupils: Pupils are equal, round, and reactive to light.   Cardiovascular:      Rate and Rhythm: Regular rhythm. Tachycardia present.      Pulses: Normal pulses.      Heart sounds: Normal heart sounds. No murmur heard.     No friction rub. No gallop.   Pulmonary:      Effort: Pulmonary effort is normal. No respiratory distress.      Breath sounds: Normal breath sounds. No stridor. No wheezing or rales.   Abdominal:      General: Abdomen is flat. Bowel sounds are normal. There is no distension.      Palpations: Abdomen is soft.      Tenderness: There is no abdominal tenderness. There is no guarding or rebound.      Comments: No abdominal TTP, rebound or guarding    Musculoskeletal:         General: No signs of injury. Normal range of motion.      Cervical back: Normal range of motion and neck supple. No rigidity.      Right lower leg: No edema.      Left lower leg: No edema.   Skin:     General: Skin is warm and dry.      Coloration: Skin is not jaundiced.      Findings: No erythema.   Neurological:      Mental Status:  She is alert.      Comments: Alert and oriented to person, place, time, situation. Moving all extremities equally. No facial asymmetry. No tremor noted. No tongue fasciculations. Frequent movement/fidgeting of lower extremities.    Psychiatric:         Speech: Speech normal.      Comments: Answers questions appropriately, appears restless/agitated.             Vents:     Lines/Drains/Airways       Peripheral Intravenous Line  Duration                  Peripheral IV - Single Lumen 02/03/24 0158 20 G Anterior;Distal;Right Forearm <1 day                  Significant Labs:    CBC/Anemia Profile:  Recent Labs   Lab 02/02/24  2158   WBC 27.75*   HGB 10.5*   HCT 34.9*      MCV 84   RDW 16.5*        Chemistries:  Recent Labs   Lab 02/02/24 2158      K 4.1      CO2 20*   BUN 19   CREATININE 0.7   CALCIUM 9.1   ALBUMIN 4.0   PROT 7.0   BILITOT 0.7   ALKPHOS 58   ALT 30   AST 30       All pertinent labs within the past 24 hours have been reviewed.    Significant Imaging: I have reviewed all pertinent imaging results/findings within the past 24 hours.  Assessment/Plan:     Psychiatric  Depression with anxiety  - Continue home escitalopram     Alcohol withdrawal  Received 1mg Ativan, 30mg Valium, 260mg phenobarbital in the ED  - [facility] protocols   - 130mg phenobarbital PRN     Cardiac/Vascular  Essential hypertension  Continue home Losartan    Oncology  CLL (chronic lymphocytic leukemia)  - Patient reports that she is not currently receiving therapy for her CLL.   - WBC is elevated on admission  - Consider heme/onc consult    Endocrine  Type 2 diabetes mellitus with hypoglycemia  - monitor glucose  - SSI ordered     Hypothyroidism  - continue home synthroid     GI  Nausea  - PRN zofran for nausea  - EKG pending for Qtc monitoring in setting of multiple Qtc prolonging medications     Other  Tobacco abuse  PRN nicotine patch         Critical Care Daily Checklist:    A: Awake: RASS Goal/Actual Goal:  0   Actual:   +1   B: Spontaneous Breathing Trial Performed?  NA   C: SAT & SBT Coordinated?  NA                      D: Delirium: CAM-ICU     E: Early Mobility Performed? Yes   F: Feeding Goal:    Status:     Current Diet Order   No orders of the defined types were placed in this encounter.      AS: Analgesia/Sedation none   T: Thromboembolic Prophylaxis lovenox   H: HOB > 300 Yes   U: Stress Ulcer Prophylaxis (if needed) na   G: Glucose Control SSI   B: Bowel Function     I: Indwelling Catheter (Lines & Boudreaux) Necessity PIV   D: De-escalation of Antimicrobials/Pharmacotherapies na    Plan for the day/ETD Continue treating withdrawal, monitor for respiratory depression    Code Status:  Family/Goals of Care: Full Code       Critical secondary to Patient has a condition that poses threat to life and bodily function: Alcohol withdrawal with risk of seizure, risk of respiratory depression due to medications.     Critical care was time spent personally by me on the following activities: development of treatment plan with patient or surrogate and bedside caregivers, discussions with consultants, evaluation of patient's response to treatment, examination of patient, ordering and performing treatments and interventions, ordering and review of laboratory studies, ordering and review of radiographic studies, pulse oximetry, re-evaluation of patient's condition. This critical care time did not overlap with that of any other provider or involve time for any procedures.     Arturo Arreguin MD  Critical Care Medicine  [facility] - Emergency Dept

## 2024-02-03 NOTE — ED NOTES
Restrains removed. Lethergic--Oriented to person and place. Warm blanket given . Sitter at bedside.

## 2024-02-03 NOTE — ED PROVIDER NOTES
Encounter Date: 2/2/2024       History     Chief Complaint   Patient presents with    Vomiting     Pt comes from home for vomiting. Pt endorses generalized pain.      63-year-old female presenting with vomiting.    PMH: CLL (reports no current treatment), DM type 2, COPD, depression, diverticulitis, fatty liver, GERD, hypertension, hyperlipidemia, peptic ulcer disease, polysubstance use, sarcoidosis, anemia, breast cancer status post bilateral mastectomy, alcohol use disorder      The patient endorses daily ethanol use.  She has had multiple episodes of vomiting today, denies seeing any blood.  Unsure of last bowel movement.  Status post hysterectomy.  Denies chest pain or shortness of breath.  Denies abdominal pain per se, but states that she feels nauseated.  She is asking for nausea medicine and something for feeling tremulous.  Symptoms began today.     The history is provided by the patient and medical records. No  was used.     Review of patient's allergies indicates:   Allergen Reactions    Lortab [hydrocodone-acetaminophen] Itching    Promethazine Itching and Other (See Comments)     Past Medical History:   Diagnosis Date    Alcoholism     c/b alcohol withdrawl seizures 7/2017    Anemia     Cancer of breast 10/2020    s/p bilateral mastectomy for  T1b N0 stage IA breast cancer October 2020    CLL (chronic lymphocytic leukemia) 09/30/2022 6/22/22 - PB flow cytometry  Immunophenotyping of peripheral blood detects a distinct kappa light chain restricted monoclonal B-cell population  (calculated at 2.44x10 9 /L, from the most recent CBC showing a total WBC of  7.35 K/uL with 61% total lymphocytes)  with a CLL phenotype (coexpression of CD19, CD5, CD23 and dim CD20). CD22 (FITC), FMC-7 and CD38 are negative in this population.    Controlled type 2 diabetes mellitus without complication, without long-term current use of insulin 11/30/2021    COPD (chronic obstructive pulmonary disease)      Depression     Diverticulitis     Fatty liver     GERD (gastroesophageal reflux disease)     Hyperlipidemia     Hypertension     Pancreatitis     Peptic ulcer disease     Polysubstance abuse     Posterior reversible encephalopathy syndrome     Sarcoidosis of lung     over 30 yrs ago    Suicide attempt      Past Surgical History:   Procedure Laterality Date    APPENDECTOMY      BILATERAL MASTECTOMY Bilateral 10/29/2020    Procedure: MASTECTOMY, BILATERAL;  Surgeon: Baylee Kevin MD;  Location: [address];  Service: General;  Laterality: Bilateral;    BREAST REVISION SURGERY Bilateral 2/11/2021    Procedure: BREAST REVISION SURGERY;  Surgeon: Scottie Johnson MD;  Location: [address];  Service: Plastics;  Laterality: Bilateral;    COLONOSCOPY N/A 7/28/2017    Procedure: COLONOSCOPY;  Surgeon: Aaron Alvarado MD;  Location: [facility];  Service: Endoscopy;  Laterality: N/A;    ESOPHAGOGASTRODUODENOSCOPY  10/7/2016, 11/6/2014    2016 - gastritis, duodenitis, 2014 erosive gastritis    ESOPHAGOGASTRODUODENOSCOPY N/A 2/11/2020    Procedure: ESOPHAGOGASTRODUODENOSCOPY (EGD);  Surgeon: Fawn Garrido MD;  Location: [facility];  Service: Endoscopy;  Laterality: N/A;    ESOPHAGOGASTRODUODENOSCOPY N/A 4/19/2021    Procedure: EGD (ESOPHAGOGASTRODUODENOSCOPY);  Surgeon: Paramjit Martino MD;  Location: [facility];  Service: Endoscopy;  Laterality: N/A;    FLEXIBLE SIGMOIDOSCOPY  11/06/2014    colitis    HYSTERECTOMY      IMPLANTATION OF PERMANENT SACRAL NERVE STIMULATOR N/A 7/12/2022    Procedure: INSERTION, NEUROSTIMULATOR, PERMANENT, SACRAL;  Surgeon: Juaquin Edwards MD;  Location: [address];  Service: Urology;  Laterality: N/A;  1hr    INJECTION FOR SENTINEL NODE IDENTIFICATION Right 10/29/2020    Procedure: INJECTION, FOR SENTINEL NODE IDENTIFICATION;  Surgeon: Baylee Kevin MD;  Location: [address];  Service: General;  Laterality: Right;    INJECTION OF JOINT Right 10/10/2019     Procedure: Injection, Joint RIGHT ILIOPSOAS BURSA/TENDON INJECTION AND RIGHT GLUTEAL TENDON INJECTION WITH STEROID AND LIDOCAINE;  Surgeon: Guillaume Rico MD;  Location: [facility];  Service: Pain Management;  Laterality: Right;  NEEDS CONSENT    INSERTION OF BREAST TISSUE EXPANDER Bilateral 10/29/2020    Procedure: INSERTION, TISSUE EXPANDER, BREAST;  Surgeon: Scottie Johnson MD;  Location: [facility] OR [address] [place] [address];  Service: Plastics;  Laterality: Bilateral;  Right breast: 1082 g  Left breast: 1076 g    LIPOSUCTION Bilateral 2/11/2021    Procedure: LIPOSUCTION;  Surgeon: Scottie Johnson MD;  Location: [facility] OR [address] [place] [address];  Service: Plastics;  Laterality: Bilateral;    mediastenoscopy      REPLACEMENT OF IMPLANT OF BREAST Bilateral 2/11/2021    Procedure: REPLACEMENT, IMPLANT, BREAST;  Surgeon: Scottie Johnson MD;  Location: [facility] OR [address] [place] [address];  Service: Plastics;  Laterality: Bilateral;    SENTINEL LYMPH NODE BIOPSY Right 10/29/2020    Procedure: BIOPSY, LYMPH NODE, SENTINEL;  Surgeon: Baylee Kevin MD;  Location: [facility] OR [address] [place] [address];  Service: General;  Laterality: Right;    TONSILLECTOMY N/A 1970    TUBAL LIGATION       Family History   Problem Relation Age of Onset    Heart attack Father     Diabetes Father     Hypertension Father     Diabetes Mother     Hypertension Mother     Breast cancer Maternal Aunt     Colon cancer Maternal Uncle     Breast cancer Daughter     Ovarian cancer Neg Hx     Cancer Neg Hx      Social History     Tobacco Use    Smoking status: Every Day     Current packs/day: 0.00     Average packs/day: 0.5 packs/day for 30.0 years (15.0 ttl pk-yrs)     Types: Vaping with nicotine, Cigarettes     Start date: 2/1/1991     Last attempt to quit: 2/1/2021     Years since quitting: 3.0    Smokeless tobacco: Never    Tobacco comments:     Patient is currently smoking 10 cigarettes a day, declines nicotine patches   Substance Use Topics    Alcohol use: Yes     Comment: vodka daily (half a  regular bottle) for 4 days    Drug use: Yes     Types: Marijuana     Comment: gummies         Physical Exam     Initial Vitals [02/02/24 2042]   BP Pulse Resp Temp SpO2   133/89 97 18 97.9 °F (36.6 °C) 96 %      MAP       --         Physical Exam    Nursing note and vitals reviewed.  Constitutional: She is not diaphoretic. No distress.   HENT:   Head: Normocephalic and atraumatic.   Eyes: Right eye exhibits no discharge. Left eye exhibits no discharge.   Neck: Neck supple. No tracheal deviation present.   Cardiovascular:  Normal rate and regular rhythm.           Pulmonary/Chest: Breath sounds normal. No respiratory distress.   Abdominal: Abdomen is soft. She exhibits distension. There is abdominal tenderness (Mild, epigastric). There is no tenderness at McBurney's point and negative Mccollum's sign.   Musculoskeletal:      Cervical back: Neck supple.      Comments: Mild hand tremor     Neurological: She is alert and oriented to person, place, and time. She has normal strength. No cranial nerve deficit or sensory deficit.   Skin: Skin is warm. No rash noted.   Psychiatric: She has a normal mood and affect. Her behavior is normal.         ED Course   Procedures  Labs Reviewed   CBC W/ AUTO DIFFERENTIAL - Abnormal; Notable for the following components:       Result Value    WBC 27.75 (*)     Hemoglobin 10.5 (*)     Hematocrit 34.9 (*)     MCH 25.1 (*)     MCHC 30.1 (*)     RDW 16.5 (*)     All other components within normal limits   COMPREHENSIVE METABOLIC PANEL - Abnormal; Notable for the following components:    CO2 20 (*)     Glucose 66 (*)     All other components within normal limits   CBC W/ AUTO DIFFERENTIAL - Abnormal; Notable for the following components:    WBC 23.80 (*)     Hemoglobin 10.4 (*)     Hematocrit 33.6 (*)     MCH 25.6 (*)     MCHC 31.0 (*)     RDW 16.6 (*)     Immature Grans (Abs) 0.11 (*)     Lymph # 16.0 (*)     Mono # 1.1 (*)     Gran % 27.3 (*)     Lymph % 67.0 (*)     All other components  within normal limits   COMPREHENSIVE METABOLIC PANEL - Abnormal; Notable for the following components:    CO2 19 (*)     Alkaline Phosphatase 54 (*)     All other components within normal limits   PHOSPHORUS - Abnormal; Notable for the following components:    Phosphorus 2.6 (*)     All other components within normal limits   TROPONIN I   LIPASE   MAGNESIUM   URINALYSIS, REFLEX TO URINE CULTURE   POCT GLUCOSE     EKG Readings: (Independently Interpreted)   Initial Reading: No STEMI. Rhythm: Normal Sinus Rhythm. Heart Rate: 95. Ectopy: No Ectopy. Clinical Impression: Normal Sinus Rhythm   Qtc 439       Imaging Results              CT Abdomen Pelvis With IV Contrast NO Oral Contrast (Final result)  Result time 02/03/24 02:53:28      Final result by Boubacar Rob MD (02/03/24 02:53:28)                   Impression:      1. No acute abnormalities identified in the abdomen or pelvis.  2. Roger hepatis and external iliac chain lymphadenopathy in keeping with known CLL.  3. Hepatic steatosis  4. Additional findings as detailed above.    Electronically signed by resident: Jania Griggs  Date:    02/03/2024  Time:    01:52    Electronically signed by: Boubacar Rob MD  Date:    02/03/2024  Time:    02:53               Narrative:    EXAMINATION:  CT ABDOMEN PELVIS WITH IV CONTRAST    CLINICAL HISTORY:  Nausea/vomiting;    TECHNIQUE:  Low dose axial images were obtained from the lung bases to the pubic symphysis following the intravenous administration of 100 cc of Omnipaque 350.  Sagittal and coronal reformats were provided.  Oral contrast was not administered.    COMPARISON:  CTA chest 09/16/2023    CT abdomen pelvis 04/26/2023    CT chest abdomen pelvis 03/13/2023    FINDINGS:  Heart: Visualized heart appears unremarkable.    Lung bases: Left lower lobe linear bandlike density favored to reflect scarring.  No consolidation, pleural effusion, mass, or pneumothorax.    Liver: Normal in size.  Diffusely  hypoattenuating parenchyma suggestive of steatosis.    Gallbladder: Normal in appearance without evidence for cholecystitis.    Bile Ducts: No intra or extrahepatic biliary ductal dilation.    Pancreas: Mild fatty atrophy.  No pancreatic mass lesion or peripancreatic inflammatory change.    Spleen: Normal in size.  Subcentimeter hypodensity, too small to characterize.  Several accessory splenules in the left upper abdomen.    Adrenals: Right adrenal gland is unremarkable.  1.0 cm left adrenal gland hypoattenuating lesion, which appears stable compared to imaging dating back to 2016.    Kidneys/ Ureters: Kidneys are normal in size and in the expected location.  Kidneys enhance symmetrically.  Subcentimeter renal hypodensity, too small to characterize.  No focal renal abnormality or nephrolithiasis.  No hydroureteronephrosis.    Bladder: Smooth contours without bladder wall thickening.    Reproductive organs: Uterus is surgically absent.    GI Tract/Mesentery: The stomach is unremarkable.  Visualized loops of small and large bowel are normal in caliber without evidence for obstruction or inflammation.   Scattered diverticulosis without evidence of acute diverticulitis.  Submucosal fat attenuation in the cecum and ascending colon which can be seen in the setting of chronic inflammatory bowel disease, obesity or sequela of chemotherapy.    Prominent para-aortic and peripancreatic lymph nodes similar to prior.  Enlarged nolvia hepatis and external iliac chain lymph node in keeping with known CLL.    No abdominopelvic ascites or intraperitoneal free air.    Abdominal wall/extraperitoneal soft tissues: Small fat containing umbilical hernia.  Sacral nerve stimulator in the right gluteal soft tissues.    Vasculature: Abdominal aorta is normal in caliber, contour, and course.  Mild to moderate aortoiliac calcific atherosclerosis.    Bones: Postoperative changes of L4-L5 posterior fusion with interbody spacer and L4  decompressed laminectomy.  No evidence of hardware failure.  Stepwise grade 1 retrolisthesis L2-L4.  Degenerative changes of the visualized osseous structures.    Degenerative changes without acute fracture or bone destructive process.                                       Medications   glucose chewable tablet 16 g (has no administration in time range)   glucose chewable tablet 24 g (has no administration in time range)   dextrose 10% bolus 125 mL 125 mL (has no administration in time range)   dextrose 10% bolus 250 mL 250 mL (has no administration in time range)   glucagon (human recombinant) injection 1 mg (has no administration in time range)   naloxone 0.4 mg/mL injection 0.02 mg (has no administration in time range)   glucose chewable tablet 16 g (has no administration in time range)   glucose chewable tablet 24 g (has no administration in time range)   glucagon (human recombinant) injection 1 mg (has no administration in time range)   enoxaparin injection 40 mg (has no administration in time range)   insulin aspart U-100 pen 0-5 Units (has no administration in time range)   dextrose 10% bolus 125 mL 125 mL (has no administration in time range)   dextrose 10% bolus 250 mL 250 mL (has no administration in time range)   EScitalopram oxalate tablet 10 mg (10 mg Oral Given 2/3/24 0856)   levothyroxine tablet 75 mcg (75 mcg Oral Given 2/3/24 0642)   losartan-hydrochlorothiazide 100-25 mg per tablet 1 tablet (1 tablet Oral Given 2/3/24 0856)   multivitamin tablet (1 tablet Oral Given 2/3/24 0856)   folic acid tablet 1 mg (1 mg Oral Given 2/3/24 0856)   thiamine tablet 100 mg (100 mg Oral Given 2/3/24 0856)   phenobarbital (LUMINAL) 130 mg in sodium chloride 0.9% 100 mL IVPB (has no administration in time range)   ondansetron injection 8 mg (8 mg Intravenous Given 2/3/24 0911)   ondansetron injection 4 mg (4 mg Intravenous Given 2/2/24 2158)   LORazepam injection 1 mg (1 mg Intravenous Given 2/2/24 2159)   sodium  chloride 0.9% bolus 1,000 mL 1,000 mL (0 mLs Intravenous Stopped 2/2/24 2346)   droPERidol injection 0.625 mg (0.625 mg Intravenous Given 2/2/24 2327)   diphenhydrAMINE injection 25 mg (25 mg Intravenous Given 2/3/24 0052)   diazePAM injection 10 mg (10 mg Intravenous Given 2/3/24 0052)   iohexoL (OMNIPAQUE 350) injection 100 mL (100 mLs Intravenous Given 2/3/24 0120)   diazePAM injection 20 mg (20 mg Intravenous Given 2/3/24 0156)   phenobarbital (LUMINAL) 260 mg in sodium chloride 0.9% 100 mL IVPB (260 mg Intravenous Given 2/3/24 0313)   haloperidol lactate injection 5 mg (5 mg Intravenous Given 2/3/24 0224)   thiamine (B-1) 500 mg in dextrose 5 % (D5W) 100 mL IVPB (0 mg Intravenous Stopped 2/3/24 0353)   diazePAM injection 20 mg (20 mg Intravenous Given 2/3/24 0312)   diazePAM injection 20 mg (20 mg Intravenous Given 2/3/24 0513)     Medical Decision Making  63-year-old female presenting with vomiting.  On arrival, afebrile, mild hand tremor, non-peritonitic abdomen. Plan for IV, lab evaluation, CT to assess for emergent intra-abdominal pathology.     Differential including, but not limited to:  Pancreatitis, alcohol withdrawal, ACS, bowel obstruction  Lower suspicion for ACS, no acute ischemic EKG changes, neg trop.   Lipase normal.   Labs notable for leukocytosis. Hypoglycemia on CMP - ordered for hypoglycemia protocol. Received ativan, zofran, droperidol for symptoms. IVF for rehydration.     Signed out to oncoming provider at 2300 pending CT A/P, UA. Anticipate admission for further sx control.     Amount and/or Complexity of Data Reviewed  External Data Reviewed: notes.     Details: Discharge summary from 11/2023 admission reviewed. Discharged to voluntary rehab on valium taper.   Labs: ordered. Decision-making details documented in ED Course.  Radiology: ordered.  ECG/medicine tests: ordered and independent interpretation performed.    Risk  OTC drugs.  Prescription drug management.  Decision regarding  hospitalization.               ED Course as of 02/03/24 0913 Fri Feb 02, 2024 2239 WBC(!): 27.75  Leukocytosis  [AB]   2239 Glucose(!): 66  Ordered for hypoglycemia order panel  [AB]   2240 Hemoglobin(!): 10.5  Most recent 9.1 [AB]   2240 Lipase: 23 [AB]      ED Course User Index  [AB] Royal Bolanos MD                           Clinical Impression:  Final diagnoses:  [R11.10] Vomiting  [R10.9] Abdominal pain, unspecified abdominal location (Primary)  [D72.829] Leukocytosis, unspecified type  [F10.939] Alcohol withdrawal  [E16.2] Hypoglycemia          ED Disposition Condition    Admit                 Royal Bolanos MD  02/03/24 0913

## 2024-02-03 NOTE — NURSING
"Pt stating she wants to leave against medical advice, saying "I'm leaving" and "I want to get out of here. Provider called to bedside. Pt is A&Ox4 at this time and acknowledges the risks of leaving AMA explained by the provider. Pt signed AMA papers. All IV's and monitoring removed; pt walked out of the unit.  "

## 2024-02-03 NOTE — ASSESSMENT & PLAN NOTE
- PRN zofran for nausea  - EKG pending for Qtc monitoring in setting of multiple Qtc prolonging medications

## 2024-02-03 NOTE — DISCHARGE SUMMARY
[facility] - Cardiac Medical ICU  Critical Care Medicine  Discharge Summary      Patient Name: Earl Abdul  MRN: [id number]  Admission Date: 2/2/2024  Hospital Length of Stay: 0 days  Discharge Date and Time:  02/03/2024 12:13 PM  Attending Physician: Fawn Garrido MD   Discharging Provider: Fawn Garrido MD  Primary Care Provider: Andrew Rodriguez MD  Reason for Admission: Alcohol withdrawal/intoxication    HPI:   Ms Earl Abdul is a 62yo Female with PMHx of alcohol use disorder with hx of prior seizures, alcohol withdrawal, depression with suicide attempt 2017, non-insulin dependent DM, COPD, GERD, fibromyalgia, sarcoidosis, breast Ca, CLL (no current treatment), HTN, HLD, hypothyroidism.     She presented to the ED tonight with complaint of nausea, vomiting, generalized abdominal pain, SOB which started yesterday morning. Now her primary complaint is restless legs. She reports daily alcohol use, last drink was yesterday. She also endorses a history of alcohol withdrawals, and reports her symptoms now are similar to previous withdrawal symptoms. Her last BM was yesterday, she denies fever, chills, chest pain, urinary symptoms, cough, congestion, runny nose, HA, seizures. She denies current therapy for her CLL.     In the ED, she was noted to have confusion, hand tremor, tachycardia. Workup notable for hypoglycemia which improved with drinking juice. She also had a leukocytosis of 27.5, mild anemia. Lipase normal. CT chest/abd/pelvis with no acute abnormalities identified but it did demonstrate nolvia hepatis and external iliac chain lymphadenopathy in keeping with known CLL as well as hepatic steatosis.     She was given 1mg ativan, a total of 30mg Valium, 0.625 of droperidol, 5mg Haldol in the ED without resolution of withdrawal symptoms so was then given 260mg phenobarbital.     MICU consulted for further treatment of withdrawals and careful monitoring due to high doses of medications with risk of  respiratory depression.     * No surgery found *    Indwelling Lines/Drains at Time of Discharge:   Lines/Drains/Airways       None                 Hospital Course:   Ms Earl Abdul is a 64yo Female with PMHx of alcohol use disorder with hx of prior seizures, alcohol withdrawal, depression with suicide attempt 2017, non-insulin dependent DM, COPD, GERD, fibromyalgia, sarcoidosis, breast Ca, CLL (no current treatment), HTN, HLD, hypothyroidism.      She presented to the ED tonight with complaint of nausea, vomiting, generalized abdominal pain, SOB which started yesterday morning. Now her primary complaint is restless legs. She reports daily alcohol use, last drink was yesterday. She also endorses a history of alcohol withdrawals, and reports her symptoms now are similar to previous withdrawal symptoms. Her last BM was yesterday, she denies fever, chills, chest pain, urinary symptoms, cough, congestion, runny nose, HA, seizures. She denies current therapy for her CLL.      In the ED, she was noted to have confusion, hand tremor, tachycardia. Workup notable for hypoglycemia which improved with drinking juice. She also had a leukocytosis of 27.5, mild anemia. Lipase normal. CT chest/abd/pelvis with no acute abnormalities identified but it did demonstrate nolvia hepatis and external iliac chain lymphadenopathy in keeping with known CLL as well as hepatic steatosis.      She was given 1mg ativan, a total of 30mg Valium, 0.625 of droperidol, 5mg Haldol in the ED without resolution of withdrawal symptoms so was then given 260mg phenobarbital.      MICU consulted for further treatment of withdrawals and careful monitoring due to high doses of medications with risk of respiratory depression.     Patient came up to the ICU. She is awake alert and oriented times 4. Patient left the unit AMA. Long discussion about dangers of leaving. Patient acknowledged dangers but walked out of the unit.     Consults (From admission, onward)           Status Ordering Provider     Inpatient consult to Critical Care Medicine  Once        Provider:  (Not yet assigned)    Completed MICHAEL PARTIDA          Significant Labs:  None    Significant Imaging:  I have reviewed all pertinent imaging results/findings within the past 24 hours.    Pending Diagnostic Studies:       None          Final Active Diagnoses:    Diagnosis Date Noted POA    PRINCIPAL PROBLEM:  Alcohol withdrawal [F10.939] 02/07/2014 Yes    CLL (chronic lymphocytic leukemia) [C91.10] 09/30/2022 Yes     Chronic    Hypothyroidism [E03.9] 11/30/2021 Yes    Type 2 diabetes mellitus with hypoglycemia [E11.649] 11/30/2021 Yes    Depression with anxiety [F41.8] 08/16/2017 Yes    Nausea [R11.0] 10/16/2014 Yes    Essential hypertension [I10] 02/07/2014 Yes     Chronic    Tobacco abuse [Z72.0] 02/07/2014 Yes     Chronic      Problems Resolved During this Admission:     No new Assessment & Plan notes have been filed under this hospital service since the last note was generated.  Service: Critical Care Medicine    Discharged Condition: against medical advice    Disposition:       Patient Instructions:   No discharge procedures on file.  Medications:  None     Fawn Garrido MD  Critical Care Medicine  [facility] - Cardiac Medical ICU

## 2024-02-03 NOTE — HOSPITAL COURSE
Ms Earl Abdul is a 64yo Female with PMHx of alcohol use disorder with hx of prior seizures, alcohol withdrawal, depression with suicide attempt 2017, non-insulin dependent DM, COPD, GERD, fibromyalgia, sarcoidosis, breast Ca, CLL (no current treatment), HTN, HLD, hypothyroidism.      She presented to the ED tonight with complaint of nausea, vomiting, generalized abdominal pain, SOB which started yesterday morning. Now her primary complaint is restless legs. She reports daily alcohol use, last drink was yesterday. She also endorses a history of alcohol withdrawals, and reports her symptoms now are similar to previous withdrawal symptoms. Her last BM was yesterday, she denies fever, chills, chest pain, urinary symptoms, cough, congestion, runny nose, HA, seizures. She denies current therapy for her CLL.      In the ED, she was noted to have confusion, hand tremor, tachycardia. Workup notable for hypoglycemia which improved with drinking juice. She also had a leukocytosis of 27.5, mild anemia. Lipase normal. CT chest/abd/pelvis with no acute abnormalities identified but it did demonstrate nolvia hepatis and external iliac chain lymphadenopathy in keeping with known CLL as well as hepatic steatosis.      She was given 1mg ativan, a total of 30mg Valium, 0.625 of droperidol, 5mg Haldol in the ED without resolution of withdrawal symptoms so was then given 260mg phenobarbital.      MICU consulted for further treatment of withdrawals and careful monitoring due to high doses of medications with risk of respiratory depression.     Patient came up to the ICU. She is awake alert and oriented times 4. Patient left the unit AMA. Long discussion about dangers of leaving. Patient acknowledged dangers but walked out of the unit.

## 2024-02-03 NOTE — SUBJECTIVE & OBJECTIVE
Past Medical History:   Diagnosis Date    Alcoholism     c/b alcohol withdrawl seizures 7/2017    Anemia     Cancer of breast 10/2020    s/p bilateral mastectomy for  T1b N0 stage IA breast cancer October 2020    CLL (chronic lymphocytic leukemia) 09/30/2022 6/22/22 - PB flow cytometry  Immunophenotyping of peripheral blood detects a distinct kappa light chain restricted monoclonal B-cell population  (calculated at 2.44x10 9 /L, from the most recent CBC showing a total WBC of  7.35 K/uL with 61% total lymphocytes)  with a CLL phenotype (coexpression of CD19, CD5, CD23 and dim CD20). CD22 (FITC), FMC-7 and CD38 are negative in this population.    Controlled type 2 diabetes mellitus without complication, without long-term current use of insulin 11/30/2021    COPD (chronic obstructive pulmonary disease)     Depression     Diverticulitis     Fatty liver     GERD (gastroesophageal reflux disease)     Hyperlipidemia     Hypertension     Pancreatitis     Peptic ulcer disease     Polysubstance abuse     Posterior reversible encephalopathy syndrome     Sarcoidosis of lung     over 30 yrs ago    Suicide attempt        Past Surgical History:   Procedure Laterality Date    APPENDECTOMY      BILATERAL MASTECTOMY Bilateral 10/29/2020    Procedure: MASTECTOMY, BILATERAL;  Surgeon: Baylee Kevin MD;  Location: [address];  Service: General;  Laterality: Bilateral;    BREAST REVISION SURGERY Bilateral 2/11/2021    Procedure: BREAST REVISION SURGERY;  Surgeon: Scottie Johnson MD;  Location: [address];  Service: Plastics;  Laterality: Bilateral;    COLONOSCOPY N/A 7/28/2017    Procedure: COLONOSCOPY;  Surgeon: Aaron Alvarado MD;  Location: [facility];  Service: Endoscopy;  Laterality: N/A;    ESOPHAGOGASTRODUODENOSCOPY  10/7/2016, 11/6/2014    2016 - gastritis, duodenitis, 2014 erosive gastritis    ESOPHAGOGASTRODUODENOSCOPY N/A 2/11/2020    Procedure: ESOPHAGOGASTRODUODENOSCOPY (EGD);  Surgeon: Fawn Mon  MD Hema;  Location: [facility];  Service: Endoscopy;  Laterality: N/A;    ESOPHAGOGASTRODUODENOSCOPY N/A 4/19/2021    Procedure: EGD (ESOPHAGOGASTRODUODENOSCOPY);  Surgeon: Paramjit Martino MD;  Location: [facility] ENDO;  Service: Endoscopy;  Laterality: N/A;    FLEXIBLE SIGMOIDOSCOPY  11/06/2014    colitis    HYSTERECTOMY      IMPLANTATION OF PERMANENT SACRAL NERVE STIMULATOR N/A 7/12/2022    Procedure: INSERTION, NEUROSTIMULATOR, PERMANENT, SACRAL;  Surgeon: Juaquin Edwards MD;  Location: [address];  Service: Urology;  Laterality: N/A;  1hr    INJECTION FOR SENTINEL NODE IDENTIFICATION Right 10/29/2020    Procedure: INJECTION, FOR SENTINEL NODE IDENTIFICATION;  Surgeon: Baylee Kevin MD;  Location: [address];  Service: General;  Laterality: Right;    INJECTION OF JOINT Right 10/10/2019    Procedure: Injection, Joint RIGHT ILIOPSOAS BURSA/TENDON INJECTION AND RIGHT GLUTEAL TENDON INJECTION WITH STEROID AND LIDOCAINE;  Surgeon: Guillaume Rico MD;  Location: [facility] PAIN MGT;  Service: Pain Management;  Laterality: Right;  NEEDS CONSENT    INSERTION OF BREAST TISSUE EXPANDER Bilateral 10/29/2020    Procedure: INSERTION, TISSUE EXPANDER, BREAST;  Surgeon: Scottie Johnson MD;  Location: [address];  Service: Plastics;  Laterality: Bilateral;  Right breast: 1082 g  Left breast: 1076 g    LIPOSUCTION Bilateral 2/11/2021    Procedure: LIPOSUCTION;  Surgeon: Scottie Johnson MD;  Location: [address];  Service: Plastics;  Laterality: Bilateral;    mediastenoscopy      REPLACEMENT OF IMPLANT OF BREAST Bilateral 2/11/2021    Procedure: REPLACEMENT, IMPLANT, BREAST;  Surgeon: Scottie Johnson MD;  Location: [facility] OR [address] [place] [address];  Service: Plastics;  Laterality: Bilateral;    SENTINEL LYMPH NODE BIOPSY Right 10/29/2020    Procedure: BIOPSY, LYMPH NODE, SENTINEL;  Surgeon: Baylee Kevin MD;  Location: [address];  Service: General;  Laterality: Right;    TONSILLECTOMY N/A 1970    TUBAL  LIGATION         Review of patient's allergies indicates:   Allergen Reactions    Lortab [hydrocodone-acetaminophen] Itching    Promethazine Itching and Other (See Comments)       Family History       Problem Relation (Age of Onset)    Breast cancer Maternal Aunt, Daughter    Colon cancer Maternal Uncle    Diabetes Father, Mother    Heart attack Father    Hypertension Father, Mother          Tobacco Use    Smoking status: Every Day     Current packs/day: 0.00     Average packs/day: 0.5 packs/day for 30.0 years (15.0 ttl pk-yrs)     Types: Vaping with nicotine, Cigarettes     Start date: 2/1/1991     Last attempt to quit: 2/1/2021     Years since quitting: 3.0    Smokeless tobacco: Never    Tobacco comments:     Patient is currently smoking 10 cigarettes a day, declines nicotine patches   Substance and Sexual Activity    Alcohol use: Yes     Comment: vodka daily (half a regular bottle) for 4 days    Drug use: Yes     Types: Marijuana     Comment: gummies    Sexual activity: Yes     Birth control/protection: Surgical      Review of Systems   Constitutional:  Negative for chills and fever.   HENT:  Negative for congestion.    Eyes:  Negative for visual disturbance.   Respiratory:  Positive for shortness of breath. Negative for cough.    Cardiovascular:  Negative for chest pain.   Gastrointestinal:  Positive for abdominal pain, nausea and vomiting. Negative for diarrhea.   Genitourinary:  Negative for difficulty urinating and dysuria.   Musculoskeletal:  Negative for myalgias.   Skin:  Negative for wound.   Neurological:  Positive for tremors. Negative for seizures.   Psychiatric/Behavioral:  Positive for agitation and confusion.      Objective:     Vital Signs (Most Recent):  Temp: 97.9 °F (36.6 °C) (02/02/24 2042)  Pulse: (!) 118 (02/03/24 0312)  Resp: 14 (02/03/24 0312)  BP: 139/70 (02/03/24 0312)  SpO2: (!) 90 % (02/03/24 0312) Vital Signs (24h Range):  Temp:  [97.9 °F (36.6 °C)] 97.9 °F (36.6 °C)  Pulse:  []  118  Resp:  [14-18] 14  SpO2:  [90 %-97 %] 90 %  BP: (133-178)/(70-89) 139/70      There is no height or weight on file to calculate BMI.    No intake or output data in the 24 hours ending 02/03/24 0421       Physical Exam  Vitals and nursing note reviewed.   Constitutional:       General: She is not in acute distress.     Appearance: Normal appearance. She is normal weight. She is not diaphoretic.   HENT:      Head: Normocephalic and atraumatic.      Right Ear: External ear normal.      Left Ear: External ear normal.      Nose: Nose normal.      Mouth/Throat:      Mouth: Mucous membranes are moist.      Pharynx: Oropharynx is clear.      Comments: No tongue fasciculations noted   Eyes:      General:         Right eye: No discharge.         Left eye: No discharge.      Extraocular Movements: Extraocular movements intact.      Conjunctiva/sclera: Conjunctivae normal.      Pupils: Pupils are equal, round, and reactive to light.   Cardiovascular:      Rate and Rhythm: Regular rhythm. Tachycardia present.      Pulses: Normal pulses.      Heart sounds: Normal heart sounds. No murmur heard.     No friction rub. No gallop.   Pulmonary:      Effort: Pulmonary effort is normal. No respiratory distress.      Breath sounds: Normal breath sounds. No stridor. No wheezing or rales.   Abdominal:      General: Abdomen is flat. Bowel sounds are normal. There is no distension.      Palpations: Abdomen is soft.      Tenderness: There is no abdominal tenderness. There is no guarding or rebound.      Comments: No abdominal TTP, rebound or guarding    Musculoskeletal:         General: No signs of injury. Normal range of motion.      Cervical back: Normal range of motion and neck supple. No rigidity.      Right lower leg: No edema.      Left lower leg: No edema.   Skin:     General: Skin is warm and dry.      Coloration: Skin is not jaundiced.      Findings: No erythema.   Neurological:      Mental Status: She is alert.      Comments:  Alert and oriented to person, place, time, situation. Moving all extremities equally. No facial asymmetry. No tremor noted. No tongue fasciculations. Frequent movement/fidgeting of lower extremities.    Psychiatric:         Speech: Speech normal.      Comments: Answers questions appropriately, appears restless/agitated.             Vents:     Lines/Drains/Airways       Peripheral Intravenous Line  Duration                  Peripheral IV - Single Lumen 02/03/24 0158 20 G Anterior;Distal;Right Forearm <1 day                  Significant Labs:    CBC/Anemia Profile:  Recent Labs   Lab 02/02/24 2158   WBC 27.75*   HGB 10.5*   HCT 34.9*      MCV 84   RDW 16.5*        Chemistries:  Recent Labs   Lab 02/02/24  2158      K 4.1      CO2 20*   BUN 19   CREATININE 0.7   CALCIUM 9.1   ALBUMIN 4.0   PROT 7.0   BILITOT 0.7   ALKPHOS 58   ALT 30   AST 30       All pertinent labs within the past 24 hours have been reviewed.    Significant Imaging: I have reviewed all pertinent imaging results/findings within the past 24 hours.   H

## 2024-02-03 NOTE — ED TRIAGE NOTES
Earl Abdul, a 63 y.o. female presents to the ED w/ complaint of vomiting.     Triage note:  Chief Complaint   Patient presents with    Vomiting     Pt comes from home for vomiting. Pt endorses generalized pain.      Review of patient's allergies indicates:   Allergen Reactions    Lortab [hydrocodone-acetaminophen] Itching    Promethazine Itching and Other (See Comments)     Past Medical History:   Diagnosis Date    Alcoholism     c/b alcohol withdrawl seizures 7/2017    Anemia     Cancer of breast 10/2020    s/p bilateral mastectomy for  T1b N0 stage IA breast cancer October 2020    CLL (chronic lymphocytic leukemia) 09/30/2022 6/22/22 - PB flow cytometry  Immunophenotyping of peripheral blood detects a distinct kappa light chain restricted monoclonal B-cell population  (calculated at 2.44x10 9 /L, from the most recent CBC showing a total WBC of  7.35 K/uL with 61% total lymphocytes)  with a CLL phenotype (coexpression of CD19, CD5, CD23 and dim CD20). CD22 (FITC), FMC-7 and CD38 are negative in this population.    Controlled type 2 diabetes mellitus without complication, without long-term current use of insulin 11/30/2021    COPD (chronic obstructive pulmonary disease)     Depression     Diverticulitis     Fatty liver     GERD (gastroesophageal reflux disease)     Hyperlipidemia     Hypertension     Pancreatitis     Peptic ulcer disease     Polysubstance abuse     Posterior reversible encephalopathy syndrome     Sarcoidosis of lung     over 30 yrs ago    Suicide attempt

## 2024-02-03 NOTE — ED NOTES
Pt becoming agitated and restless- pt taking off cardiac monitor, pulse ox, and bp cuff.  MD notified.

## 2024-02-03 NOTE — ED NOTES
Pt is agitated and restless still. Pt is taking off cardiac monitor, pulse ox, and bp cuff again while stating that she wants to leave. Pt educated on the importance of her medical care. MD notified.

## 2024-02-03 NOTE — ED NOTES
Pt is taking off cardiac monitor, pulse ox, and bp cuff while yelling that she wants to leave. Pt becoming aggressive, agitated and combative. MD and charge nurse notified. Security called to bedside.

## 2024-02-03 NOTE — HPI
Ms Earl Abdul is a 62yo Female with PMHx of alcohol use disorder with hx of prior seizures, alcohol withdrawal, depression with suicide attempt 2017, non-insulin dependent DM, COPD, GERD, fibromyalgia, sarcoidosis, breast Ca, CLL (no current treatment), HTN, HLD, hypothyroidism.     She presented to the ED tonight with complaint of nausea, vomiting, generalized abdominal pain, SOB which started yesterday morning. Now her primary complaint is restless legs. She reports daily alcohol use, last drink was yesterday. She also endorses a history of alcohol withdrawals, and reports her symptoms now are similar to previous withdrawal symptoms. Her last BM was yesterday, she denies fever, chills, chest pain, urinary symptoms, cough, congestion, runny nose, HA, seizures. She denies current therapy for her CLL.     In the ED, she was noted to have confusion, hand tremor, tachycardia. Workup notable for hypoglycemia which improved with drinking juice. She also had a leukocytosis of 27.5, mild anemia. Lipase normal. CT chest/abd/pelvis with no acute abnormalities identified but it did demonstrate nolvia hepatis and external iliac chain lymphadenopathy in keeping with known CLL as well as hepatic steatosis.     She was given 1mg ativan, a total of 30mg Valium, 0.625 of droperidol, 5mg Haldol in the ED without resolution of withdrawal symptoms so was then given 260mg phenobarbital.     MICU consulted for further treatment of withdrawals and careful monitoring due to high doses of medications with risk of respiratory depression.

## 2024-02-03 NOTE — ASSESSMENT & PLAN NOTE
- Patient reports that she is not currently receiving therapy for her CLL.   - WBC is elevated on admission  - Consider heme/onc consult

## 2024-02-03 NOTE — PROVIDER PROGRESS NOTES - EMERGENCY DEPT.
Encounter Date: 2/2/2024    ED Physician Progress Notes          ED staff SIGN OUT NOTE    Patient s/o to me by Dr. Bolanos pending CT    Patient with significant worsening of alcohol withdrawal during my observation with the patient requiring upwards of 80 mg diazepam IV, phenobarbital, haloperidol for agitation.    Patient was admitted to the ICU for further treatment evaluation    Critical Care   Date: 02/03/2024  Performed by: Lary Rust MD   Authorized by: Lary Rust MD      Total critical care time (exclusive of procedural time) : 120 minutes, exclusive of separately billable procedures and treating other patients and teaching time.    Critical care was necessary to treat or prevent imminent or life-threatening deterioration of the following conditions:  alcohol withdrawal     Due to a high probability of clinically significant, life threatening deterioration, the patient required my highest level of preparedness to intervene emergently and I personally spent this critical care time directly and personally managing the patient. This critical care time included obtaining a history; examining the patient; pulse oximetry; ordering and review of studies; arranging urgent treatment with development of a management plan; evaluation of patient's response to treatment; frequent reassessment, documentation, and, discussions with other providers

## 2024-02-03 NOTE — ED NOTES
Pt is agitated and restless. Pt is taking off cardiac monitor, pulse ox, and bp cuff while stating that she wants to leave. Pt educated on the importance of her medical care. MD notified.

## 2024-02-03 NOTE — ED NOTES
"Pt is agitated and restless again. Pt is taking off cardiac monitor, pulse ox, and bp cuff. Pt is kicking her feet and yelling "I need something to make my legs stop" MD notified.   "

## 2024-02-08 LAB
BACTERIA BLD CULT: NORMAL
BACTERIA BLD CULT: NORMAL

## 2024-02-10 ENCOUNTER — HOSPITAL ENCOUNTER (INPATIENT)
Facility: HOSPITAL | Age: 64
LOS: 4 days | Discharge: HOME HEALTH | DRG: 564 | End: 2024-02-14
Attending: EMERGENCY MEDICINE | Admitting: EMERGENCY MEDICINE
Payer: COMMERCIAL

## 2024-02-10 DIAGNOSIS — R52 PAIN: ICD-10-CM

## 2024-02-10 DIAGNOSIS — S50.12XA CONTUSION OF LEFT FOREARM, INITIAL ENCOUNTER: ICD-10-CM

## 2024-02-10 DIAGNOSIS — R68.89 WITHDRAWAL COMPLAINT: ICD-10-CM

## 2024-02-10 DIAGNOSIS — F10.939 ALCOHOL WITHDRAWAL SYNDROME WITH COMPLICATION: ICD-10-CM

## 2024-02-10 DIAGNOSIS — T79.6XXA TRAUMATIC RHABDOMYOLYSIS, INITIAL ENCOUNTER: Primary | ICD-10-CM

## 2024-02-10 DIAGNOSIS — F41.8 DEPRESSION WITH ANXIETY: ICD-10-CM

## 2024-02-10 DIAGNOSIS — R07.9 CHEST PAIN: ICD-10-CM

## 2024-02-10 DIAGNOSIS — F10.20 ALCOHOL USE DISORDER, SEVERE, DEPENDENCE: ICD-10-CM

## 2024-02-10 DIAGNOSIS — F10.930 ALCOHOL WITHDRAWAL SYNDROME WITHOUT COMPLICATION: ICD-10-CM

## 2024-02-10 DIAGNOSIS — R10.12 LEFT UPPER QUADRANT ABDOMINAL PAIN: ICD-10-CM

## 2024-02-10 DIAGNOSIS — M62.82 RHABDOMYOLYSIS: ICD-10-CM

## 2024-02-10 PROBLEM — R74.8 ELEVATED CK: Status: ACTIVE | Noted: 2024-02-10

## 2024-02-10 PROBLEM — M79.632 LEFT FOREARM PAIN: Status: ACTIVE | Noted: 2024-02-10

## 2024-02-10 LAB
ALBUMIN SERPL BCP-MCNC: 4 G/DL (ref 3.5–5.2)
ALP SERPL-CCNC: 64 U/L (ref 55–135)
ALT SERPL W/O P-5'-P-CCNC: 117 U/L (ref 10–44)
AMORPH CRY UR QL COMP ASSIST: ABNORMAL
AMPHET+METHAMPHET UR QL: NEGATIVE
ANION GAP SERPL CALC-SCNC: 17 MMOL/L (ref 8–16)
AST SERPL-CCNC: 392 U/L (ref 10–40)
BACTERIA #/AREA URNS AUTO: ABNORMAL /HPF
BARBITURATES UR QL SCN>200 NG/ML: ABNORMAL
BASOPHILS # BLD AUTO: 0.02 K/UL (ref 0–0.2)
BASOPHILS NFR BLD: 0.1 % (ref 0–1.9)
BENZODIAZ UR QL SCN>200 NG/ML: ABNORMAL
BILIRUB SERPL-MCNC: 1.3 MG/DL (ref 0.1–1)
BILIRUB UR QL STRIP: NEGATIVE
BUN SERPL-MCNC: 20 MG/DL (ref 8–23)
BZE UR QL SCN: NEGATIVE
CALCIUM SERPL-MCNC: 9.2 MG/DL (ref 8.7–10.5)
CANNABINOIDS UR QL SCN: NEGATIVE
CHLORIDE SERPL-SCNC: 103 MMOL/L (ref 95–110)
CK SERPL-CCNC: ABNORMAL U/L (ref 20–180)
CLARITY UR REFRACT.AUTO: ABNORMAL
CO2 SERPL-SCNC: 20 MMOL/L (ref 23–29)
COLOR UR AUTO: YELLOW
CREAT SERPL-MCNC: 2.3 MG/DL (ref 0.5–1.4)
CREAT UR-MCNC: 121 MG/DL (ref 15–325)
CRP SERPL-MCNC: 75.9 MG/L (ref 0–8.2)
DIFFERENTIAL METHOD BLD: ABNORMAL
EOSINOPHIL # BLD AUTO: 0 K/UL (ref 0–0.5)
EOSINOPHIL NFR BLD: 0 % (ref 0–8)
ERYTHROCYTE [DISTWIDTH] IN BLOOD BY AUTOMATED COUNT: 16.8 % (ref 11.5–14.5)
ERYTHROCYTE [SEDIMENTATION RATE] IN BLOOD BY PHOTOMETRIC METHOD: 10 MM/HR (ref 0–36)
EST. GFR  (NO RACE VARIABLE): 23 ML/MIN/1.73 M^2
ETHANOL SERPL-MCNC: <10 MG/DL
ETHANOL UR-MCNC: 18 MG/DL
GLUCOSE SERPL-MCNC: 133 MG/DL (ref 70–110)
GLUCOSE UR QL STRIP: NEGATIVE
HCT VFR BLD AUTO: 33.9 % (ref 37–48.5)
HGB BLD-MCNC: 10.6 G/DL (ref 12–16)
HGB UR QL STRIP: ABNORMAL
HYALINE CASTS UR QL AUTO: 11 /LPF
IMM GRANULOCYTES # BLD AUTO: 0.05 K/UL (ref 0–0.04)
IMM GRANULOCYTES NFR BLD AUTO: 0.3 % (ref 0–0.5)
KETONES UR QL STRIP: ABNORMAL
LEUKOCYTE ESTERASE UR QL STRIP: ABNORMAL
LIPASE SERPL-CCNC: 10 U/L (ref 4–60)
LYMPHOCYTES # BLD AUTO: 5.7 K/UL (ref 1–4.8)
LYMPHOCYTES NFR BLD: 36.5 % (ref 18–48)
MAGNESIUM SERPL-MCNC: 1.6 MG/DL (ref 1.6–2.6)
MCH RBC QN AUTO: 25.7 PG (ref 27–31)
MCHC RBC AUTO-ENTMCNC: 31.3 G/DL (ref 32–36)
MCV RBC AUTO: 82 FL (ref 82–98)
METHADONE UR QL SCN>300 NG/ML: NEGATIVE
MICROSCOPIC COMMENT: ABNORMAL
MONOCYTES # BLD AUTO: 1.1 K/UL (ref 0.3–1)
MONOCYTES NFR BLD: 7.1 % (ref 4–15)
NEUTROPHILS # BLD AUTO: 8.7 K/UL (ref 1.8–7.7)
NEUTROPHILS NFR BLD: 56 % (ref 38–73)
NITRITE UR QL STRIP: NEGATIVE
NON-SQ EPI CELLS #/AREA URNS AUTO: 3 /HPF
NRBC BLD-RTO: 0 /100 WBC
OHS QRS DURATION: 82 MS
OHS QTC CALCULATION: 445 MS
OPIATES UR QL SCN: NEGATIVE
PCP UR QL SCN>25 NG/ML: NEGATIVE
PH UR STRIP: 5 [PH] (ref 5–8)
PLATELET # BLD AUTO: 108 K/UL (ref 150–450)
PMV BLD AUTO: 9.7 FL (ref 9.2–12.9)
POCT GLUCOSE: 126 MG/DL (ref 70–110)
POTASSIUM SERPL-SCNC: 4.2 MMOL/L (ref 3.5–5.1)
PROT SERPL-MCNC: 7 G/DL (ref 6–8.4)
PROT UR QL STRIP: ABNORMAL
RBC # BLD AUTO: 4.13 M/UL (ref 4–5.4)
RBC #/AREA URNS AUTO: 8 /HPF (ref 0–4)
SODIUM SERPL-SCNC: 140 MMOL/L (ref 136–145)
SP GR UR STRIP: 1.01 (ref 1–1.03)
SQUAMOUS #/AREA URNS AUTO: 10 /HPF
TOXICOLOGY INFORMATION: ABNORMAL
URN SPEC COLLECT METH UR: ABNORMAL
WBC # BLD AUTO: 15.59 K/UL (ref 3.9–12.7)
WBC #/AREA URNS AUTO: 83 /HPF (ref 0–5)

## 2024-02-10 PROCEDURE — 20600001 HC STEP DOWN PRIVATE ROOM

## 2024-02-10 PROCEDURE — 82077 ASSAY SPEC XCP UR&BREATH IA: CPT | Performed by: EMERGENCY MEDICINE

## 2024-02-10 PROCEDURE — 83690 ASSAY OF LIPASE: CPT | Performed by: EMERGENCY MEDICINE

## 2024-02-10 PROCEDURE — 63600175 PHARM REV CODE 636 W HCPCS

## 2024-02-10 PROCEDURE — 85025 COMPLETE CBC W/AUTO DIFF WBC: CPT | Performed by: EMERGENCY MEDICINE

## 2024-02-10 PROCEDURE — 63600175 PHARM REV CODE 636 W HCPCS: Performed by: EMERGENCY MEDICINE

## 2024-02-10 PROCEDURE — 25000003 PHARM REV CODE 250: Performed by: EMERGENCY MEDICINE

## 2024-02-10 PROCEDURE — 96367 TX/PROPH/DG ADDL SEQ IV INF: CPT

## 2024-02-10 PROCEDURE — 25000003 PHARM REV CODE 250

## 2024-02-10 PROCEDURE — 82550 ASSAY OF CK (CPK): CPT | Performed by: EMERGENCY MEDICINE

## 2024-02-10 PROCEDURE — 93005 ELECTROCARDIOGRAM TRACING: CPT

## 2024-02-10 PROCEDURE — 87086 URINE CULTURE/COLONY COUNT: CPT | Performed by: EMERGENCY MEDICINE

## 2024-02-10 PROCEDURE — 99223 1ST HOSP IP/OBS HIGH 75: CPT | Mod: ,,, | Performed by: INTERNAL MEDICINE

## 2024-02-10 PROCEDURE — 80307 DRUG TEST PRSMV CHEM ANLYZR: CPT

## 2024-02-10 PROCEDURE — 99285 EMERGENCY DEPT VISIT HI MDM: CPT | Mod: 25

## 2024-02-10 PROCEDURE — 96375 TX/PRO/DX INJ NEW DRUG ADDON: CPT

## 2024-02-10 PROCEDURE — 63700000 PHARM REV CODE 250 ALT 637 W/O HCPCS

## 2024-02-10 PROCEDURE — 93010 ELECTROCARDIOGRAM REPORT: CPT | Mod: ,,, | Performed by: INTERNAL MEDICINE

## 2024-02-10 PROCEDURE — 81001 URINALYSIS AUTO W/SCOPE: CPT | Mod: XB | Performed by: EMERGENCY MEDICINE

## 2024-02-10 PROCEDURE — 83735 ASSAY OF MAGNESIUM: CPT | Performed by: EMERGENCY MEDICINE

## 2024-02-10 PROCEDURE — 80053 COMPREHEN METABOLIC PANEL: CPT | Performed by: EMERGENCY MEDICINE

## 2024-02-10 PROCEDURE — 96361 HYDRATE IV INFUSION ADD-ON: CPT

## 2024-02-10 PROCEDURE — 82962 GLUCOSE BLOOD TEST: CPT

## 2024-02-10 PROCEDURE — 85652 RBC SED RATE AUTOMATED: CPT

## 2024-02-10 PROCEDURE — 96365 THER/PROPH/DIAG IV INF INIT: CPT

## 2024-02-10 PROCEDURE — 86140 C-REACTIVE PROTEIN: CPT

## 2024-02-10 RX ORDER — LORAZEPAM 2 MG/ML
2 INJECTION INTRAMUSCULAR
Status: DISCONTINUED | OUTPATIENT
Start: 2024-02-10 | End: 2024-02-14 | Stop reason: HOSPADM

## 2024-02-10 RX ORDER — GLUCAGON 1 MG
1 KIT INJECTION
Status: DISCONTINUED | OUTPATIENT
Start: 2024-02-10 | End: 2024-02-14 | Stop reason: HOSPADM

## 2024-02-10 RX ORDER — NALOXONE HCL 0.4 MG/ML
0.02 VIAL (ML) INJECTION
Status: DISCONTINUED | OUTPATIENT
Start: 2024-02-10 | End: 2024-02-14 | Stop reason: HOSPADM

## 2024-02-10 RX ORDER — MAGNESIUM SULFATE HEPTAHYDRATE 40 MG/ML
2 INJECTION, SOLUTION INTRAVENOUS ONCE
Status: COMPLETED | OUTPATIENT
Start: 2024-02-10 | End: 2024-02-10

## 2024-02-10 RX ORDER — TALC
6 POWDER (GRAM) TOPICAL NIGHTLY PRN
Status: DISCONTINUED | OUTPATIENT
Start: 2024-02-10 | End: 2024-02-14 | Stop reason: HOSPADM

## 2024-02-10 RX ORDER — HEPARIN SODIUM 5000 [USP'U]/ML
5000 INJECTION, SOLUTION INTRAVENOUS; SUBCUTANEOUS EVERY 8 HOURS
Status: DISCONTINUED | OUTPATIENT
Start: 2024-02-10 | End: 2024-02-14 | Stop reason: HOSPADM

## 2024-02-10 RX ORDER — LORAZEPAM 2 MG/ML
1 INJECTION INTRAMUSCULAR
Status: COMPLETED | OUTPATIENT
Start: 2024-02-10 | End: 2024-02-10

## 2024-02-10 RX ORDER — INSULIN ASPART 100 [IU]/ML
0-5 INJECTION, SOLUTION INTRAVENOUS; SUBCUTANEOUS EVERY 6 HOURS PRN
Status: DISCONTINUED | OUTPATIENT
Start: 2024-02-10 | End: 2024-02-14 | Stop reason: HOSPADM

## 2024-02-10 RX ORDER — SODIUM CHLORIDE 9 MG/ML
INJECTION, SOLUTION INTRAVENOUS CONTINUOUS
Status: ACTIVE | OUTPATIENT
Start: 2024-02-10 | End: 2024-02-11

## 2024-02-10 RX ORDER — IBUPROFEN 200 MG
24 TABLET ORAL
Status: DISCONTINUED | OUTPATIENT
Start: 2024-02-10 | End: 2024-02-14 | Stop reason: HOSPADM

## 2024-02-10 RX ORDER — THIAMINE HCL 100 MG
100 TABLET ORAL DAILY
Status: DISCONTINUED | OUTPATIENT
Start: 2024-02-10 | End: 2024-02-14 | Stop reason: HOSPADM

## 2024-02-10 RX ORDER — LEVOTHYROXINE SODIUM 75 UG/1
75 TABLET ORAL
Status: DISCONTINUED | OUTPATIENT
Start: 2024-02-11 | End: 2024-02-14 | Stop reason: HOSPADM

## 2024-02-10 RX ORDER — IBUPROFEN 200 MG
16 TABLET ORAL
Status: DISCONTINUED | OUTPATIENT
Start: 2024-02-10 | End: 2024-02-14 | Stop reason: HOSPADM

## 2024-02-10 RX ORDER — LORAZEPAM 1 MG/1
2 TABLET ORAL EVERY 4 HOURS PRN
Status: DISCONTINUED | OUTPATIENT
Start: 2024-02-10 | End: 2024-02-10

## 2024-02-10 RX ORDER — DIAZEPAM 10 MG/2ML
5 INJECTION INTRAMUSCULAR EVERY 4 HOURS PRN
Status: DISCONTINUED | OUTPATIENT
Start: 2024-02-10 | End: 2024-02-10

## 2024-02-10 RX ORDER — SODIUM CHLORIDE 0.9 % (FLUSH) 0.9 %
10 SYRINGE (ML) INJECTION EVERY 12 HOURS PRN
Status: DISCONTINUED | OUTPATIENT
Start: 2024-02-10 | End: 2024-02-14 | Stop reason: HOSPADM

## 2024-02-10 RX ORDER — GLUCAGON 1 MG
1 KIT INJECTION
Status: DISCONTINUED | OUTPATIENT
Start: 2024-02-10 | End: 2024-02-10

## 2024-02-10 RX ORDER — FOLIC ACID 1 MG/1
1 TABLET ORAL DAILY
Status: DISCONTINUED | OUTPATIENT
Start: 2024-02-11 | End: 2024-02-14 | Stop reason: HOSPADM

## 2024-02-10 RX ORDER — ESCITALOPRAM OXALATE 10 MG/1
10 TABLET ORAL DAILY
Status: DISCONTINUED | OUTPATIENT
Start: 2024-02-11 | End: 2024-02-14 | Stop reason: HOSPADM

## 2024-02-10 RX ORDER — AZITHROMYCIN 250 MG/1
500 TABLET, FILM COATED ORAL DAILY
Status: DISPENSED | OUTPATIENT
Start: 2024-02-10 | End: 2024-02-13

## 2024-02-10 RX ADMIN — SODIUM CHLORIDE 1000 ML: 9 INJECTION, SOLUTION INTRAVENOUS at 12:02

## 2024-02-10 RX ADMIN — THIAMINE HCL TAB 100 MG 100 MG: 100 TAB at 06:02

## 2024-02-10 RX ADMIN — FOLIC ACID: 5 INJECTION, SOLUTION INTRAMUSCULAR; INTRAVENOUS; SUBCUTANEOUS at 03:02

## 2024-02-10 RX ADMIN — CEFTRIAXONE 1 G: 1 INJECTION, POWDER, FOR SOLUTION INTRAMUSCULAR; INTRAVENOUS at 06:02

## 2024-02-10 RX ADMIN — MAGNESIUM SULFATE HEPTAHYDRATE 2 G: 40 INJECTION, SOLUTION INTRAVENOUS at 08:02

## 2024-02-10 RX ADMIN — SODIUM CHLORIDE 1000 ML: 9 INJECTION, SOLUTION INTRAVENOUS at 02:02

## 2024-02-10 RX ADMIN — SODIUM CHLORIDE: 9 INJECTION, SOLUTION INTRAVENOUS at 06:02

## 2024-02-10 RX ADMIN — LORAZEPAM 1 MG: 2 INJECTION INTRAMUSCULAR; INTRAVENOUS at 12:02

## 2024-02-10 RX ADMIN — LORAZEPAM 2 MG: 2 INJECTION INTRAMUSCULAR; INTRAVENOUS at 07:02

## 2024-02-10 RX ADMIN — HEPARIN SODIUM 5000 UNITS: 5000 INJECTION INTRAVENOUS; SUBCUTANEOUS at 09:02

## 2024-02-10 RX ADMIN — AZITHROMYCIN DIHYDRATE 500 MG: 250 TABLET ORAL at 06:02

## 2024-02-10 RX ADMIN — PHENOBARBITAL SODIUM 860.6 MG: 130 INJECTION INTRAMUSCULAR at 02:02

## 2024-02-10 NOTE — ASSESSMENT & PLAN NOTE
Last clinic visit 6/30/22  Letrozole started in February 2021.  Transitioned to anastrozole but patient not taking.

## 2024-02-10 NOTE — HPI
Earl Abdul is a 63 y.o. female with EtOH abuse disorder, DM2, MDD, HTN, COPD, Breast CA, and CLL (not currently receiving treatment) who presented to [facility] ED on 2/10/24 comlaining of pain in her L arm and leg due to a fall.  She is well-known to [facility] ED staff for presentations for EtOH intoxication/withdrawal.  Reports her legs suddenly felt weak and gave out, and that she hit the back of her head but can't remember if she lost consciousness.    She reports her last drink was at 10A, first drink this AM at 7:30.  Given Ativan 1 mg in ED, reports some relief.   RR 22 /67 on arrival;  during our evaluation.  WBC 15.59 Hgb 10.6.  Crt 2.3.  CK > 22941.  EtOH < 10.    Notably, she was admitted to the MICU on 2/4/24 with similar withdrawal symptoms but more severe, requiring high doses of benzodiazepines, phenobarb, and antipsychotics. She left AMA the next morning.

## 2024-02-10 NOTE — ASSESSMENT & PLAN NOTE
"Patient's FSGs are controlled on current medication regimen.  Last A1c reviewed-   Lab Results   Component Value Date    HGBA1C 5.5 11/04/2023     Most recent fingerstick glucose reviewed- No results for input(s): "POCTGLUCOSE" in the last 24 hours.  Current correctional scale  Low  Maintain anti-hyperglycemic dose as follows-   Antihyperglycemics (From admission, onward)      Start     Stop Route Frequency Ordered    02/10/24 1821  insulin aspart U-100 pen 0-5 Units         -- SubQ Every 6 hours PRN 02/10/24 1721          Hold Oral hypoglycemics while patient is in the hospital.    Plan:   - POCT glucose   - Diabetic diet   - BGL inpatient goal is 140-180  "

## 2024-02-10 NOTE — CONSULTS
[facility] Emergency Dept  Critical Care Medicine  Consult Note    Patient Name: Earl Abdul  MRN: [id number]  Admission Date: 2/10/2024  Hospital Length of Stay: 0 days  Code Status: Prior  Attending Physician:  Dr. Elliott  Primary Care Provider: Andrew Rodriguez MD   Principal Problem: <principal problem not specified>    Consults  Subjective:     HPI:  Earl Abdul is a 63 y.o. female with EtOH abuse disorder, DM2, MDD, HTN, COPD, Breast CA, and CLL (not currently receiving treatment) who presented to [facility] ED on 2/10/24 comlaining of pain in her L arm and leg due to a fall.  She is well-known to [facility] ED staff for presentations for EtOH intoxication/withdrawal.  Reports her legs suddenly felt weak and gave out, and that she hit the back of her head but can't remember if she lost consciousness.    She reports her last drink was at 10A, first drink this AM at 7:30.  Given Ativan 1 mg in ED, reports some relief.   RR 22 /67 on arrival;  during our evaluation.  WBC 15.59 Hgb 10.6.  Crt 2.3.  CK > 32072.  EtOH < 10.    Notably, she was admitted to the MICU on 2/4/24 with similar withdrawal symptoms but more severe, requiring high doses of benzodiazepines, phenobarb, and antipsychotics. She left AMA the next morning.     Hospital/ICU Course:  No notes on file    Past Medical History:   Diagnosis Date    Alcoholism     c/b alcohol withdrawl seizures 7/2017    Anemia     Cancer of breast 10/2020    s/p bilateral mastectomy for  T1b N0 stage IA breast cancer October 2020    CLL (chronic lymphocytic leukemia) 09/30/2022 6/22/22 - PB flow cytometry  Immunophenotyping of peripheral blood detects a distinct kappa light chain restricted monoclonal B-cell population  (calculated at 2.44x10 9 /L, from the most recent CBC showing a total WBC of  7.35 K/uL with 61% total lymphocytes)  with a CLL phenotype (coexpression of CD19, CD5, CD23 and dim CD20). CD22 (FITC), FMC-7 and CD38 are negative in  this population.    Controlled type 2 diabetes mellitus without complication, without long-term current use of insulin 11/30/2021    COPD (chronic obstructive pulmonary disease)     Depression     Diverticulitis     Fatty liver     GERD (gastroesophageal reflux disease)     Hyperlipidemia     Hypertension     Pancreatitis     Peptic ulcer disease     Polysubstance abuse     Posterior reversible encephalopathy syndrome     Sarcoidosis of lung     over 30 yrs ago    Suicide attempt        Past Surgical History:   Procedure Laterality Date    APPENDECTOMY      BILATERAL MASTECTOMY Bilateral 10/29/2020    Procedure: MASTECTOMY, BILATERAL;  Surgeon: Baylee Kevin MD;  Location: [facility] OR [address] [place] [address];  Service: General;  Laterality: Bilateral;    BREAST REVISION SURGERY Bilateral 2/11/2021    Procedure: BREAST REVISION SURGERY;  Surgeon: Scottie Johnson MD;  Location: [facility] OR [address] [place] [address];  Service: Plastics;  Laterality: Bilateral;    COLONOSCOPY N/A 7/28/2017    Procedure: COLONOSCOPY;  Surgeon: Aaron Alvarado MD;  Location: [facility];  Service: Endoscopy;  Laterality: N/A;    ESOPHAGOGASTRODUODENOSCOPY  10/7/2016, 11/6/2014    2016 - gastritis, duodenitis, 2014 erosive gastritis    ESOPHAGOGASTRODUODENOSCOPY N/A 2/11/2020    Procedure: ESOPHAGOGASTRODUODENOSCOPY (EGD);  Surgeon: Fawn Garrido MD;  Location: [facility];  Service: Endoscopy;  Laterality: N/A;    ESOPHAGOGASTRODUODENOSCOPY N/A 4/19/2021    Procedure: EGD (ESOPHAGOGASTRODUODENOSCOPY);  Surgeon: Paramjit Martino MD;  Location: [facility];  Service: Endoscopy;  Laterality: N/A;    FLEXIBLE SIGMOIDOSCOPY  11/06/2014    colitis    HYSTERECTOMY      IMPLANTATION OF PERMANENT SACRAL NERVE STIMULATOR N/A 7/12/2022    Procedure: INSERTION, NEUROSTIMULATOR, PERMANENT, SACRAL;  Surgeon: Juaquin Edwards MD;  Location: [facility] OR [address] [place] [address];  Service: Urology;  Laterality: N/A;  1hr    INJECTION FOR SENTINEL NODE IDENTIFICATION Right 10/29/2020    Procedure:  INJECTION, FOR SENTINEL NODE IDENTIFICATION;  Surgeon: Baylee Kevin MD;  Location: [address];  Service: General;  Laterality: Right;    INJECTION OF JOINT Right 10/10/2019    Procedure: Injection, Joint RIGHT ILIOPSOAS BURSA/TENDON INJECTION AND RIGHT GLUTEAL TENDON INJECTION WITH STEROID AND LIDOCAINE;  Surgeon: Guillaume Rico MD;  Location: [facility];  Service: Pain Management;  Laterality: Right;  NEEDS CONSENT    INSERTION OF BREAST TISSUE EXPANDER Bilateral 10/29/2020    Procedure: INSERTION, TISSUE EXPANDER, BREAST;  Surgeon: Scottie Johnson MD;  Location: [address];  Service: Plastics;  Laterality: Bilateral;  Right breast: 1082 g  Left breast: 1076 g    LIPOSUCTION Bilateral 2/11/2021    Procedure: LIPOSUCTION;  Surgeon: Scottie Johnson MD;  Location: [address];  Service: Plastics;  Laterality: Bilateral;    mediastenoscopy      REPLACEMENT OF IMPLANT OF BREAST Bilateral 2/11/2021    Procedure: REPLACEMENT, IMPLANT, BREAST;  Surgeon: Scottie Johnson MD;  Location: [address];  Service: Plastics;  Laterality: Bilateral;    SENTINEL LYMPH NODE BIOPSY Right 10/29/2020    Procedure: BIOPSY, LYMPH NODE, SENTINEL;  Surgeon: Baylee Kevin MD;  Location: [address];  Service: General;  Laterality: Right;    TONSILLECTOMY N/A 1970    TUBAL LIGATION         Review of patient's allergies indicates:   Allergen Reactions    Lortab [hydrocodone-acetaminophen] Itching    Promethazine Itching and Other (See Comments)       Family History       Problem Relation (Age of Onset)    Breast cancer Maternal Aunt, Daughter    Colon cancer Maternal Uncle    Diabetes Father, Mother    Heart attack Father    Hypertension Father, Mother          Tobacco Use    Smoking status: Every Day     Current packs/day: 0.00     Average packs/day: 0.5 packs/day for 30.0 years (15.0 ttl pk-yrs)     Types: Vaping with nicotine, Cigarettes     Start date: 2/1/1991     Last attempt to quit:  2/1/2021     Years since quitting: 3.0    Smokeless tobacco: Never    Tobacco comments:     Patient is currently smoking 10 cigarettes a day, declines nicotine patches   Substance and Sexual Activity    Alcohol use: Yes     Comment: vodka daily (half a regular bottle) for 4 days    Drug use: Yes     Types: Marijuana     Comment: gummies    Sexual activity: Yes     Birth control/protection: Surgical     Objective:     Vital Signs (Most Recent):  Temp: 98.1 °F (36.7 °C) (02/10/24 1125)  Pulse: (!) 132 (02/10/24 1125)  Resp: (!) 22 (02/10/24 1125)  BP: (!) 140/67 (02/10/24 1125)  SpO2: 95 % (02/10/24 1125) Vital Signs (24h Range):  Temp:  [98.1 °F (36.7 °C)] 98.1 °F (36.7 °C)  Pulse:  [132] 132  Resp:  [22] 22  SpO2:  [95 %] 95 %  BP: (140)/(67) 140/67   Weight: 86.2 kg (190 lb)  Body mass index is 30.67 kg/m².    No intake or output data in the 24 hours ending 02/10/24 1537       Physical Exam  Constitutional:       Appearance: She is not diaphoretic.   HENT:      Head: Contusion (occiput) present.      Comments: No Koch sign or raccoon eyes.      Right Ear: External ear normal.      Left Ear: External ear normal.      Nose: Nose normal.      Mouth/Throat:      Mouth: Mucous membranes are dry.      Tongue: Tongue does not deviate from midline.   Eyes:      General: No scleral icterus.     Extraocular Movements: Extraocular movements intact.      Pupils: Pupils are equal, round, and reactive to light.   Cardiovascular:      Rate and Rhythm: Regular rhythm. Tachycardia present.      Heart sounds: Normal heart sounds.   Pulmonary:      Effort: Pulmonary effort is normal. No respiratory distress.      Breath sounds: Normal breath sounds. No wheezing.   Abdominal:      General: Abdomen is flat.      Palpations: Abdomen is soft.      Tenderness: There is abdominal tenderness in the left lower quadrant. There is no guarding or rebound. Negative signs include Mccollum's sign.   Musculoskeletal:         General: Swelling and  "tenderness present.      Cervical back: Normal range of motion.      Comments: Swelling and TTP to the LUE from elbow to wrist.    Skin:     Findings: Bruising (left lateral knee.) present.   Neurological:      Mental Status: She is alert.      Cranial Nerves: Facial asymmetry present.      Motor: Tremor present.   Psychiatric:         Attention and Perception: Attention normal. She does not perceive auditory or visual hallucinations.         Speech: Speech normal.         Behavior: Behavior is cooperative.            Vents:     Lines/Drains/Airways       Peripheral Intravenous Line  Duration                  Peripheral IV - Single Lumen 02/10/24 1154 20 G Right Antecubital <1 day                  Significant Labs:    CBC/Anemia Profile:  Recent Labs   Lab 02/10/24  1212   WBC 15.59*   HGB 10.6*   HCT 33.9*   *   MCV 82   RDW 16.8*        Chemistries:  Recent Labs   Lab 02/10/24  1212      K 4.2      CO2 20*   BUN 20   CREATININE 2.3*   CALCIUM 9.2   ALBUMIN 4.0   PROT 7.0   BILITOT 1.3*   ALKPHOS 64   *   *   MG 1.6     CK > 25k    All pertinent labs within the past 24 hours have been reviewed.  Ethanol < 10    Significant Imaging: I have reviewed all pertinent imaging results/findings within the past 24 hours.  CT pan scan pending  No obvious fractures on Xrays of L extremities    ABG  No results for input(s): "PH", "PO2", "PCO2", "HCO3", "BE" in the last 168 hours.  Assessment/Plan:     Psychiatric  Alcohol withdrawal  Treatment with 1 mg Ativan followed by phenobarbital initiated in ED.  Pt with minor tremors and CIWA-Ar 15 during our exam, which was prior to administration of phenobarbital.    Can continue treatment on the floor, where benzodiazepine-based protocols are carried out regularly  If any staff is unfamiliar with phenobarbital, please direct them to [email or web address] starting at slide 31  If reason for floor refusal " is unfamiliarity or hospital policy on phenobarbital, please transition to benzodiazepine-based protocol or consider Librium  Pt is clinically stable for floor admission, with no ICU-exclusive needs at this time    Alcohol use disorder, severe, dependence  If she doesn't leave AMA beforehand: will need to have family/friend designated to give her a ride to check in to alcohol rehab after DC.    Orthopedic  Rhabdomyolysis  Although she has muscle pain and weakness present on the side onto which she fell, labs are c/w rhabdo.  However, her labs are not so deranged as to warrant treatment with any modality that is exclusive to the ICU.    - Continue treatment with IVF  - Consider orthopedic involvement if concern for compartment syndrome or occult fracture          Thank you for your consult. I will sign off. Please contact us if you have any additional questions.     Arturo Arreguin MD  Critical Care Medicine  [facility]

## 2024-02-10 NOTE — SUBJECTIVE & OBJECTIVE
Past Medical History:   Diagnosis Date    Alcoholism     c/b alcohol withdrawl seizures 7/2017    Anemia     Cancer of breast 10/2020    s/p bilateral mastectomy for  T1b N0 stage IA breast cancer October 2020    CLL (chronic lymphocytic leukemia) 09/30/2022 6/22/22 - PB flow cytometry  Immunophenotyping of peripheral blood detects a distinct kappa light chain restricted monoclonal B-cell population  (calculated at 2.44x10 9 /L, from the most recent CBC showing a total WBC of  7.35 K/uL with 61% total lymphocytes)  with a CLL phenotype (coexpression of CD19, CD5, CD23 and dim CD20). CD22 (FITC), FMC-7 and CD38 are negative in this population.    Controlled type 2 diabetes mellitus without complication, without long-term current use of insulin 11/30/2021    COPD (chronic obstructive pulmonary disease)     Depression     Diverticulitis     Fatty liver     GERD (gastroesophageal reflux disease)     Hyperlipidemia     Hypertension     Pancreatitis     Peptic ulcer disease     Polysubstance abuse     Posterior reversible encephalopathy syndrome     Sarcoidosis of lung     over 30 yrs ago    Suicide attempt        Past Surgical History:   Procedure Laterality Date    APPENDECTOMY      BILATERAL MASTECTOMY Bilateral 10/29/2020    Procedure: MASTECTOMY, BILATERAL;  Surgeon: Baylee Kevin MD;  Location: [address];  Service: General;  Laterality: Bilateral;    BREAST REVISION SURGERY Bilateral 2/11/2021    Procedure: BREAST REVISION SURGERY;  Surgeon: Scottie Johnson MD;  Location: [address];  Service: Plastics;  Laterality: Bilateral;    COLONOSCOPY N/A 7/28/2017    Procedure: COLONOSCOPY;  Surgeon: Aaron Alvarado MD;  Location: [facility];  Service: Endoscopy;  Laterality: N/A;    ESOPHAGOGASTRODUODENOSCOPY  10/7/2016, 11/6/2014    2016 - gastritis, duodenitis, 2014 erosive gastritis    ESOPHAGOGASTRODUODENOSCOPY N/A 2/11/2020    Procedure: ESOPHAGOGASTRODUODENOSCOPY (EGD);  Surgeon: Fawn Mon  MD Hema;  Location: [facility];  Service: Endoscopy;  Laterality: N/A;    ESOPHAGOGASTRODUODENOSCOPY N/A 4/19/2021    Procedure: EGD (ESOPHAGOGASTRODUODENOSCOPY);  Surgeon: Paramjit Martino MD;  Location: [facility];  Service: Endoscopy;  Laterality: N/A;    FLEXIBLE SIGMOIDOSCOPY  11/06/2014    colitis    HYSTERECTOMY      IMPLANTATION OF PERMANENT SACRAL NERVE STIMULATOR N/A 7/12/2022    Procedure: INSERTION, NEUROSTIMULATOR, PERMANENT, SACRAL;  Surgeon: Juaquin Edwards MD;  Location: [address];  Service: Urology;  Laterality: N/A;  1hr    INJECTION FOR SENTINEL NODE IDENTIFICATION Right 10/29/2020    Procedure: INJECTION, FOR SENTINEL NODE IDENTIFICATION;  Surgeon: Baylee Kevin MD;  Location: [address];  Service: General;  Laterality: Right;    INJECTION OF JOINT Right 10/10/2019    Procedure: Injection, Joint RIGHT ILIOPSOAS BURSA/TENDON INJECTION AND RIGHT GLUTEAL TENDON INJECTION WITH STEROID AND LIDOCAINE;  Surgeon: Guillaume Rico MD;  Location: [facility];  Service: Pain Management;  Laterality: Right;  NEEDS CONSENT    INSERTION OF BREAST TISSUE EXPANDER Bilateral 10/29/2020    Procedure: INSERTION, TISSUE EXPANDER, BREAST;  Surgeon: Scottie Johnson MD;  Location: [address];  Service: Plastics;  Laterality: Bilateral;  Right breast: 1082 g  Left breast: 1076 g    LIPOSUCTION Bilateral 2/11/2021    Procedure: LIPOSUCTION;  Surgeon: Scottie Johnson MD;  Location: [address];  Service: Plastics;  Laterality: Bilateral;    mediastenoscopy      REPLACEMENT OF IMPLANT OF BREAST Bilateral 2/11/2021    Procedure: REPLACEMENT, IMPLANT, BREAST;  Surgeon: Scottie Johnson MD;  Location: [facility] OR [address] [place] [address];  Service: Plastics;  Laterality: Bilateral;    SENTINEL LYMPH NODE BIOPSY Right 10/29/2020    Procedure: BIOPSY, LYMPH NODE, SENTINEL;  Surgeon: Baylee Kevin MD;  Location: [address];  Service: General;  Laterality: Right;    TONSILLECTOMY N/A 1970    TUBAL  LIGATION         Review of patient's allergies indicates:   Allergen Reactions    Lortab [hydrocodone-acetaminophen] Itching    Promethazine Itching and Other (See Comments)       Family History       Problem Relation (Age of Onset)    Breast cancer Maternal Aunt, Daughter    Colon cancer Maternal Uncle    Diabetes Father, Mother    Heart attack Father    Hypertension Father, Mother          Tobacco Use    Smoking status: Every Day     Current packs/day: 0.00     Average packs/day: 0.5 packs/day for 30.0 years (15.0 ttl pk-yrs)     Types: Vaping with nicotine, Cigarettes     Start date: 2/1/1991     Last attempt to quit: 2/1/2021     Years since quitting: 3.0    Smokeless tobacco: Never    Tobacco comments:     Patient is currently smoking 10 cigarettes a day, declines nicotine patches   Substance and Sexual Activity    Alcohol use: Yes     Comment: vodka daily (half a regular bottle) for 4 days    Drug use: Yes     Types: Marijuana     Comment: gummies    Sexual activity: Yes     Birth control/protection: Surgical     Objective:     Vital Signs (Most Recent):  Temp: 98.1 °F (36.7 °C) (02/10/24 1125)  Pulse: (!) 132 (02/10/24 1125)  Resp: (!) 22 (02/10/24 1125)  BP: (!) 140/67 (02/10/24 1125)  SpO2: 95 % (02/10/24 1125) Vital Signs (24h Range):  Temp:  [98.1 °F (36.7 °C)] 98.1 °F (36.7 °C)  Pulse:  [132] 132  Resp:  [22] 22  SpO2:  [95 %] 95 %  BP: (140)/(67) 140/67   Weight: 86.2 kg (190 lb)  Body mass index is 30.67 kg/m².    No intake or output data in the 24 hours ending 02/10/24 1537       Physical Exam  Constitutional:       Appearance: She is not diaphoretic.   HENT:      Head: Contusion (occiput) present.      Comments: No Koch sign or raccoon eyes.      Right Ear: External ear normal.      Left Ear: External ear normal.      Nose: Nose normal.      Mouth/Throat:      Mouth: Mucous membranes are dry.      Tongue: Tongue does not deviate from midline.   Eyes:      General: No scleral icterus.      Extraocular Movements: Extraocular movements intact.      Pupils: Pupils are equal, round, and reactive to light.   Cardiovascular:      Rate and Rhythm: Regular rhythm. Tachycardia present.      Heart sounds: Normal heart sounds.   Pulmonary:      Effort: Pulmonary effort is normal. No respiratory distress.      Breath sounds: Normal breath sounds. No wheezing.   Abdominal:      General: Abdomen is flat.      Palpations: Abdomen is soft.      Tenderness: There is abdominal tenderness in the left lower quadrant. There is no guarding or rebound. Negative signs include Mccollum's sign.   Musculoskeletal:         General: Swelling and tenderness present.      Cervical back: Normal range of motion.      Comments: Swelling and TTP to the LUE from elbow to wrist.    Skin:     Findings: Bruising (left lateral knee.) present.   Neurological:      Mental Status: She is alert.      Cranial Nerves: Facial asymmetry present.      Motor: Tremor present.   Psychiatric:         Attention and Perception: Attention normal. She does not perceive auditory or visual hallucinations.         Speech: Speech normal.         Behavior: Behavior is cooperative.            Vents:     Lines/Drains/Airways       Peripheral Intravenous Line  Duration                  Peripheral IV - Single Lumen 02/10/24 1154 20 G Right Antecubital <1 day                  Significant Labs:    CBC/Anemia Profile:  Recent Labs   Lab 02/10/24  1212   WBC 15.59*   HGB 10.6*   HCT 33.9*   *   MCV 82   RDW 16.8*        Chemistries:  Recent Labs   Lab 02/10/24  1212      K 4.2      CO2 20*   BUN 20   CREATININE 2.3*   CALCIUM 9.2   ALBUMIN 4.0   PROT 7.0   BILITOT 1.3*   ALKPHOS 64   *   *   MG 1.6     CK > 25k    All pertinent labs within the past 24 hours have been reviewed.  Ethanol < 10    Significant Imaging: I have reviewed all pertinent imaging results/findings within the past 24 hours.  CT pan scan pending  No obvious fractures on  Xrays of L extremities

## 2024-02-10 NOTE — ASSESSMENT & PLAN NOTE
If she doesn't leave AMA beforehand: will need to have family/friend designated to give her a ride to check in to alcohol rehab after DC.

## 2024-02-10 NOTE — ED TRIAGE NOTES
"  Abdominal Pain  Arm Injury (EMS reports "heavy drinking last night"= fell at home/ got caught in bed frame, injured left arm/wrist)  Fall     "

## 2024-02-10 NOTE — ED PROVIDER NOTES
"Encounter Date: 2/10/2024       History     Chief Complaint   Patient presents with    Abdominal Pain    Fall    Arm Injury     EMS reports "heavy drinking last night"= fell at home/ got caught in bed frame, injured left arm/wrist     HPI    63-year-old female with a past medical historyf alcohol use disorder with hx of prior seizures, alcohol withdrawal, depression with suicide attempt 2017, non-insulin dependent DM, COPD, GERD, fibromyalgia, sarcoidosis, breast Ca, CLL (no current treatment), HTN, HLD, hypothyroidism.  She presents with fall and alcohol withdrawal.  She reports that she needs to stop drinking.  She reports that she drinks at least a 5th of vodka every day even since being discharged on the 3rd for very similar complaint.  She states that yesterday she drank a significant amount and went home and passed out.  She does not remember what happened.  She it some point woke up this morning and passed out again.  She again does not really remember much of the details though does remember hitting her head.  She also remembers at some point injuring her left arm.  She reports significant pain in her left arm as well as numbness.  She also reports pain in her left knee, chest, left upper abdomen.  Denies neck or back pain.  It has not on blood thinners.  Last drink at 10:00 a.m..      Review of patient's allergies indicates:   Allergen Reactions    Lortab [hydrocodone-acetaminophen] Itching    Promethazine Itching and Other (See Comments)    Albuterol      Other Reaction(s): CONFUSION     Past Medical History:   Diagnosis Date    Alcoholism     c/b alcohol withdrawl seizures 7/2017    Anemia     Aortic atherosclerosis 04/17/2024    Cancer of breast 10/2020    s/p bilateral mastectomy for  T1b N0 stage IA breast cancer October 2020    CLL (chronic lymphocytic leukemia) 09/30/2022 6/22/22 - PB flow cytometry  Immunophenotyping of peripheral blood detects a distinct kappa light chain restricted monoclonal " B-cell population  (calculated at 2.44x10 9 /L, from the most recent CBC showing a total WBC of  7.35 K/uL with 61% total lymphocytes)  with a CLL phenotype (coexpression of CD19, CD5, CD23 and dim CD20). CD22 (FITC), FMC-7 and CD38 are negative in this population.    Controlled type 2 diabetes mellitus without complication, without long-term current use of insulin 11/30/2021    COPD (chronic obstructive pulmonary disease)     Depression     Diverticulitis     Fatty liver     GERD (gastroesophageal reflux disease)     Hyperlipidemia     Hypertension     Pancreatitis     Peptic ulcer disease     Polysubstance abuse     Posterior reversible encephalopathy syndrome     Sarcoidosis of lung     over 30 yrs ago    Suicide attempt      Past Surgical History:   Procedure Laterality Date    APPENDECTOMY      BILATERAL MASTECTOMY Bilateral 10/29/2020    Procedure: MASTECTOMY, BILATERAL;  Surgeon: Baylee Kevin MD;  Location: [facility] OR [address] [place] [address];  Service: General;  Laterality: Bilateral;    BREAST REVISION SURGERY Bilateral 2/11/2021    Procedure: BREAST REVISION SURGERY;  Surgeon: Scottie Johnson MD;  Location: [facility] OR [address] [place] [address];  Service: Plastics;  Laterality: Bilateral;    COLONOSCOPY N/A 7/28/2017    Procedure: COLONOSCOPY;  Surgeon: Aaron Alvarado MD;  Location: [facility];  Service: Endoscopy;  Laterality: N/A;    ESOPHAGOGASTRODUODENOSCOPY  10/7/2016, 11/6/2014    2016 - gastritis, duodenitis, 2014 erosive gastritis    ESOPHAGOGASTRODUODENOSCOPY N/A 2/11/2020    Procedure: ESOPHAGOGASTRODUODENOSCOPY (EGD);  Surgeon: Fawn Garrido MD;  Location: [facility];  Service: Endoscopy;  Laterality: N/A;    ESOPHAGOGASTRODUODENOSCOPY N/A 4/19/2021    Procedure: EGD (ESOPHAGOGASTRODUODENOSCOPY);  Surgeon: Paramjit Martino MD;  Location: [facility];  Service: Endoscopy;  Laterality: N/A;    FLEXIBLE SIGMOIDOSCOPY  11/06/2014    colitis    HYSTERECTOMY      IMPLANTATION OF PERMANENT SACRAL NERVE STIMULATOR N/A 7/12/2022     Procedure: INSERTION, NEUROSTIMULATOR, PERMANENT, SACRAL;  Surgeon: Juaquin Edwards MD;  Location: [address];  Service: Urology;  Laterality: N/A;  1hr    INJECTION FOR SENTINEL NODE IDENTIFICATION Right 10/29/2020    Procedure: INJECTION, FOR SENTINEL NODE IDENTIFICATION;  Surgeon: Baylee Kevin MD;  Location: [address];  Service: General;  Laterality: Right;    INJECTION OF JOINT Right 10/10/2019    Procedure: Injection, Joint RIGHT ILIOPSOAS BURSA/TENDON INJECTION AND RIGHT GLUTEAL TENDON INJECTION WITH STEROID AND LIDOCAINE;  Surgeon: Guillaume Rico MD;  Location: [facility];  Service: Pain Management;  Laterality: Right;  NEEDS CONSENT    INSERTION OF BREAST TISSUE EXPANDER Bilateral 10/29/2020    Procedure: INSERTION, TISSUE EXPANDER, BREAST;  Surgeon: Scottie Johnson MD;  Location: [address];  Service: Plastics;  Laterality: Bilateral;  Right breast: 1082 g  Left breast: 1076 g    LIPOSUCTION Bilateral 2/11/2021    Procedure: LIPOSUCTION;  Surgeon: Scottie Johnson MD;  Location: [address];  Service: Plastics;  Laterality: Bilateral;    mediastenoscopy      REPLACEMENT OF IMPLANT OF BREAST Bilateral 2/11/2021    Procedure: REPLACEMENT, IMPLANT, BREAST;  Surgeon: Scottie Johnson MD;  Location: [address];  Service: Plastics;  Laterality: Bilateral;    SENTINEL LYMPH NODE BIOPSY Right 10/29/2020    Procedure: BIOPSY, LYMPH NODE, SENTINEL;  Surgeon: Baylee Kevin MD;  Location: [address];  Service: General;  Laterality: Right;    TONSILLECTOMY N/A 1970    TUBAL LIGATION       Family History   Problem Relation Name Age of Onset    Heart attack Father      Diabetes Father      Hypertension Father      Diabetes Mother      Hypertension Mother      Breast cancer Maternal Aunt      Colon cancer Maternal Uncle      Breast cancer Daughter      Ovarian cancer Neg Hx      Cancer Neg Hx       Social History     Tobacco Use    Smoking status: Every Day      Current packs/day: 0.00     Average packs/day: 0.5 packs/day for 30.0 years (15.0 ttl pk-yrs)     Types: Vaping with nicotine, Cigarettes     Start date: 2/1/1991     Last attempt to quit: 2/1/2021     Years since quitting: 3.3    Smokeless tobacco: Never    Tobacco comments:     Patient is currently smoking 10 cigarettes a day, declines nicotine patches   Substance Use Topics    Alcohol use: Yes     Comment: vodka daily (half a regular bottle) for 4 days    Drug use: Yes     Types: Marijuana     Comment: gummies     Review of Systems    Physical Exam     Initial Vitals [02/10/24 1125]   BP Pulse Resp Temp SpO2   (!) 140/67 (!) 132 (!) 22 98.1 °F (36.7 °C) 95 %      MAP       --         Physical Exam    Nursing note and vitals reviewed.  Constitutional:   Disheveled, tremulous, appears to be with withdrawing   HENT:   Head: Normocephalic.   Posterior scalp tenderness with small hematoma.  No lacerations.  No palpable skull fracture.  No facial tenderness or instability.  No blood in the nares oropharynx.  No soares sign or raccoon eyes.   Eyes: Conjunctivae and EOM are normal. Pupils are equal, round, and reactive to light.   Neck:   No midline C-spine tenderness   Normal range of motion.  Cardiovascular:  Regular rhythm and normal heart sounds.     Exam reveals no gallop and no friction rub.       No murmur heard.  Tachycardic  2+ radial pulse bilaterally.  2+ DP pulses bilaterally   Pulmonary/Chest: Breath sounds normal. No respiratory distress. She has no wheezes. She has no rhonchi. She has no rales. She exhibits tenderness.   Sternal in bilateral rib tenderness without palpable fracture   Abdominal: Abdomen is soft.   Left upper quadrant tenderness   Musculoskeletal:      Cervical back: Normal range of motion.      Comments: No right upper extremity or right lower extremity tenderness or pain with range of motion.  No T or L-spine tenderness   Left forearm is tender from the elbow to the hand.  However, there  is swelling and deformity of the mid left forearm.  She is very tender.  There is also bruising in her left hand.  She has full sensation throughout median, ulnar, radial nerve distribution on the left however is unable to extend her thumb or her ring or little finger on the left.  Bruising to the left knee with tenderness to palpation but no deformity.  Mild pain with range of motion.     Skin: Skin is warm and dry.   Diffuse scattered bruising.  On the left arm they are blisters         ED Course   Procedures  Labs Reviewed   CBC W/ AUTO DIFFERENTIAL - Abnormal; Notable for the following components:       Result Value    WBC 15.59 (*)     Hemoglobin 10.6 (*)     Hematocrit 33.9 (*)     MCH 25.7 (*)     MCHC 31.3 (*)     RDW 16.8 (*)     Platelets 108 (*)     Gran # (ANC) 8.7 (*)     Immature Grans (Abs) 0.05 (*)     Lymph # 5.7 (*)     Mono # 1.1 (*)     All other components within normal limits    Narrative:     add on cpk per  /order#[id number] @ 02/10/2024  12:17    COMPREHENSIVE METABOLIC PANEL - Abnormal; Notable for the following components:    CO2 20 (*)     Glucose 133 (*)     Creatinine 2.3 (*)     Total Bilirubin 1.3 (*)      (*)      (*)     eGFR 23.0 (*)     Anion Gap 17 (*)     All other components within normal limits    Narrative:     add on cpk per  /order#[id number] @ 02/10/2024  12:17    URINALYSIS, REFLEX TO URINE CULTURE - Abnormal; Notable for the following components:    Appearance, UA Cloudy (*)     Protein, UA 2+ (*)     Ketones, UA Trace (*)     Occult Blood UA 3+ (*)     Leukocytes, UA 3+ (*)     All other components within normal limits    Narrative:     Specimen Source->Urine   CK - Abnormal; Notable for the following components:    CPK 35067 (*)     All other components within normal limits    Narrative:     add on cpk per  /order#[id number] @ 02/10/2024  12:17    TOXICOLOGY SCREEN, URINE, RANDOM (COMPLIANCE) - Abnormal; Notable for the following  components:    Alcohol, Urine 18 (*)     Benzodiazepines Presumptive Positive (*)     Barbiturate Screen, Ur Presumptive Positive (*)     All other components within normal limits    Narrative:     Specimen Source->Urine   URINALYSIS MICROSCOPIC - Abnormal; Notable for the following components:    RBC, UA 8 (*)     WBC, UA 83 (*)     Non-Squam Epith 3 (*)     Hyaline Casts, UA 11 (*)     All other components within normal limits    Narrative:     Specimen Source->Urine   C-REACTIVE PROTEIN - Abnormal; Notable for the following components:    CRP 75.9 (*)     All other components within normal limits   POCT GLUCOSE - Abnormal; Notable for the following components:    POCT Glucose 126 (*)     All other components within normal limits   LIPASE    Narrative:     add on cpk per  /order#[id number] @ 02/10/2024  12:17    MAGNESIUM    Narrative:     add on cpk per  /order#[id number] @ 02/10/2024  12:17    ALCOHOL,MEDICAL (ETHANOL)    Narrative:     add on cpk per  /order#[id number] @ 02/10/2024  12:17    CK        ECG Results              EKG 12-lead (Final result)        Collection Time Result Time QRS Duration OHS QTC Calculation    02/10/24 12:24:08 02/10/24 23:19:44 82 445                     Final result by Interface, Lab In [facility] (02/10/24 23:19:52)                   Narrative:    Test Reason : R68.89,    Vent. Rate : 115 BPM     Atrial Rate : 115 BPM     P-R Int : 144 ms          QRS Dur : 082 ms      QT Int : 322 ms       P-R-T Axes : 074 077 055 degrees     QTc Int : 445 ms    Sinus tachycardia  Otherwise normal ECG  When compared with ECG of 02-FEB-2024 21:25,  No significant change was found  Confirmed by Alejandro Gama MD ([id number]) on 2/10/2024 11:19:42 PM    Referred By: [email or web address]   SELF           Confirmed By:Alejandro Gama MD                                  Imaging Results              US Liver with Doppler (xpd) (Final result)  Result time 02/11/24 09:02:25      Final result by  Avani Vera MD (02/11/24 09:02:25)                   Impression:      Findings consistent with the clinical history of liver disease including hepatosplenomegaly and coarsening and heterogeneity of the hepatic echotexture.  Collateral vessels at the splenic hilum raise the question of portal venous hypertension.  Otherwise, satisfactory Doppler evaluation of the liver.      Electronically signed by: Avani Vera MD  Date:    02/11/2024  Time:    09:02               Narrative:    EXAMINATION:  US LIVER WITH DOPPLER    CLINICAL HISTORY:  h/o severe EtOH use, CT abdomen c/f nodular liver;    TECHNIQUE:  Duplex scan of the liver was performed using B-mode/gray scale imaging and Doppler spectral analysis and color flow.    COMPARISON:  December 23, 2022    FINDINGS:  The visualized portion of the pancreas is unremarkable    The Liver is enlarged as previously, measuring 17.2 cm.  The liver demonstrates a coarsened, heterogeneous echotexture.  No focal hepatic lesions are seen.    The gallbladder is unremarkable with no evidence of calculi. The common duct is not dilated, measuring 4 mm. The gallbladder wall is not thickened. No dilated intrahepatic radicles are seen.    The spleen is enlarged in size measuring 15.9 x 6.9 cm with a homogeneous echotexture.  No focal splenic lesions.  Previously identified cystic lesion in the spleen is not visualized on the current study.    There is no evidence of ascites.    Duplex and color flow Doppler evaluation was performed.  The main portal vein is not dilated, 10 mm.  The main, right, and left branches of the portal vein are patent and demonstrate hepatopetal flow.  There are collateral vessels at the splenic hilum.    The middle hepatic vein, right hepatic vein, left hepatic vein, SMV, and IVC are patent with proper directional flow. The main hepatic artery is patent. The umbilical vein is not patent.                                       X-Ray Elbow Complete Left  (Final result)  Result time 02/10/24 18:51:38      Final result by Shon Boles MD (02/10/24 18:51:38)                   Impression:      1. No convincing radiographically apparent fracture or dislocation of the elbow noting there is joint effusion.  In the setting of joint effusion, radiographically occult fracture cannot be excluded.  Correlation and follow-up is advised.      Electronically signed by: Shon Boles MD  Date:    02/10/2024  Time:    18:51               Narrative:    EXAMINATION:  XR ELBOW COMPLETE 3 VIEW LEFT    CLINICAL HISTORY:  ap, rad-cap, and perfect lateral (trochlea and capitellum overlapping perfectly so joint space visible);    TECHNIQUE:  AP, lateral, and oblique views of the left elbow were performed.    COMPARISON:  Forearm radiograph 02/11/2024, elbow radiograph 06/01/2009    FINDINGS:  Three views left elbow.    There is elevation of the anterior and posterior elbow fat pads.  The anterior humeral line and radiocapitellar line are in appropriate orientation.  There is no convincing radiographically apparent fracture of the elbow.  Noting there is some angulation of the radial head, possibly reflecting prior fracture.                                        CT Chest Abdomen Pelvis Without Contrast (XPD) (Final result)  Result time 02/10/24 16:08:09      Final result by Shon Boles MD (02/10/24 16:08:09)                   Impression:      This report was flagged in Epic as abnormal.    1. No convincing findings to suggest acute solid organ injury of the chest, abdomen, or pelvis.  2. There is induration in the left inguinal region, correlation with any focal tenderness recommended.  3. Scattered ground-glass attenuation along the periphery of the right upper lobe and right lower lobe.  Developing infectious or inflammatory process is a consideration.  Contusion felt less likely given lack of overlying subcutaneous edema.  4. Peribronchial thickening involving several  airways to the left lower lobe, peribronchial inflammation or infection are considerations.  5. Findings suggesting hepatic steatosis, correlation with LFTs recommended.  6. Enlarged lymph nodes as described, similar to the previous examination.  Correlation is advised in this patient with history of malignancy.  7. Please see above for several additional findings.      Electronically signed by: Shon Boles MD  Date:    02/10/2024  Time:    16:08               Narrative:    EXAMINATION:  CT CHEST ABDOMEN PELVIS WITHOUT CONTRAST(XPD)    CLINICAL HISTORY:  Polytrauma, blunt;    TECHNIQUE:  Low dose axial images, sagittal and coronal reformations were obtained from the thoracic inlet to the pubic symphysis .  Oral contrast was not administered.    COMPARISON:  CT 02/03/2024, CTA chest 09/16/2023    FINDINGS:  The structures at the base of the neck are remarkable for a low attenuating lesion within the upper pole of the right lobe of the thyroid measuring up to 1 cm.  There are a few scattered nonenlarged upper limit of normal caliber mediastinal lymph nodes, grossly similar to the previous examination given differences in slice selection.  The heart is not enlarged.  The thoracic aorta tapers normally noting atherosclerotic calcification along its course.    Motion artifact limits evaluation of the airways and pulmonary parenchyma.  Allowing for this, the airways are patent centrally noting there is peribronchial thickening and occlusion of several distal airways to the left lower lobe.  There are scattered regions of ground-glass attenuation along the periphery of the right upper lobe and right lower lobe, new since the previous examination.  There is atelectasis along the periphery of the left upper lobe at the apex.  There is left basilar dependent atelectasis/scarring.  No pneumothorax.  No pleural effusion.    The liver is hypoattenuating suggesting steatosis, correlation with LFTs recommended.  The spleen is  prominent.  There is nonspecific thickening of the left adrenal gland.  The right adrenal gland is unremarkable.  The gallbladder is unremarkable.  There is atrophic change of the pancreas without pancreatic ductal dilation.  The stomach is decompressed without wall thickening.  There are a few scattered prominent abdominal lymph nodes as well as in the region of the nolvia hepatis.    The kidneys have a grossly unremarkable noncontrast appearance without hydronephrosis or nephrolithiasis.  The bilateral ureters are unremarkable without calculi seen.  No abnormal perisplenic or perihepatic fluid.  No abnormal perinephric fluid.  The urinary bladder is unremarkable.  The uterus is absent the adnexa is unremarkable.  No free fluid in the pelvis.    There are several scattered colonic diverticula without inflammation.  The terminal ileum is unremarkable.  The appendix is not identified, no pericecal inflammation.  The small bowel is grossly unremarkable.  There are several scattered nonenlarged to mildly prominent periaortic, pericaval, and mesenteric lymph nodes.  There is atherosclerotic calcification of the aorta and its branches.  There are several prominent left iliac chain lymph nodes extending along the left anterolateral pelvic sidewall.  Additional lymphadenopathy noted in a similar location on the right however the degree of which is less than the left.  In comparison to examination 02/03/2024, lymphadenopathy appears stable.    There is osteopenia.  There are degenerative changes of the spine.  There are degenerative changes of the bilateral femoroacetabular joints, pubic symphysis, and sacroiliac joints.  Right spinal stimulator noted.  Posterior spinal fusion hardware spans L4-L5.  There is grade 1 anterolisthesis of L3 on L4.  Endplate degenerative changes most significantly involve L2-L3.  There is disc spacing material at L4-L5.  The sacral segments are aligned.  The thoracic segments are aligned.   Allowing for motion artifact, the sternum appears intact.  There are bilateral breast prostheses.  There is bilateral axillary lymphadenopathy extending along the base of the neck bilaterally.  The degree of which appears similar to the previous examination.  There is induration along the left inguinal region anteriorly, correlation with any focal tenderness recommended noting a few prominent lymph nodes in the region.                                       CT Cervical Spine Without Contrast (Final result)  Result time 02/10/24 15:45:30      Final result by Ioana Brock MD (02/10/24 15:45:30)                   Impression:      No fracture identified.    Spondylosis of the cervical spine as detailed above.    Upper normal size nodes in the bilateral neck region, less prominent compared to 06/16/2023 exam.      Electronically signed by: Ioana Brock MD  Date:    02/10/2024  Time:    15:45               Narrative:    EXAMINATION:  CT CERVICAL SPINE WITHOUT CONTRAST    CLINICAL HISTORY:  Neck trauma, intoxicated or obtunded (Age >= 16y);    TECHNIQUE:  Low dose axial images, sagittal and coronal reformations were performed though the cervical spine.  Contrast was not administered.    COMPARISON:  None    FINDINGS:  Straightening of the cervical lordosis.    The vertebral body heights are well maintained.    Moderate disc space narrowing at C5-6.    Moderate-sized anterior osteophyte from C3 through C7.    No fracture seen, no osseous lesion seen.    The bilateral atlantooccipital joints appear normal.  The bilateral C1-C2 lateral masses appear normal.    C2-C3: Left facet joint osseous hypertrophy no canal stenosis.  Mild left foraminal narrowing.    C3-C4: Bilateral uncovertebral spur with posterior disc osteophyte complex.  Moderate right facet joint osseous hypertrophy.  There is mild canal stenosis.  There is mild left and severe right foraminal narrowing.    C4-C5: Right facet joint osseous  hypertrophy, no canal stenosis.  Mild right foraminal narrowing.    C5-C6: Left facet joint osseous hypertrophy, left uncovertebral spur.  There is severe left foraminal narrowing.    C6-C7: No canal stenosis or foraminal narrowing.    C7-T1: Unremarkable    The paraspinal soft tissues demonstrate several upper normal size to slightly enlarged upper and lower neck nodes, could be reactive, it is less prominent when compared to from 06/16/2023 exam                                       CT Head Without Contrast (Final result)  Result time 02/10/24 15:43:21      Final result by Shon Boles MD (02/10/24 15:43:21)                   Impression:      1. Allowing for motion artifact, no convincing acute intracranial abnormalities noting sequela of chronic microvascular ischemic change and senescent change.  2. Soft tissue induration overlies the right posterior occipital region at the vertex, and left maxillary sinus/zygomatic arch.  No underlying fracture.      Electronically signed by: Shon Boles MD  Date:    02/10/2024  Time:    15:43               Narrative:    EXAMINATION:  CT HEAD WITHOUT CONTRAST    CLINICAL HISTORY:  Head trauma, minor (Age >= 65y);    TECHNIQUE:  Low dose axial images were obtained through the head.  Coronal and sagittal reformations were also performed. Contrast was not administered.    COMPARISON:  11/05/2023    FINDINGS:  There is motion artifact.    There is generalized cerebral volume loss.  There is hypoattenuation in a periventricular fashion, likely sequela of chronic microvascular ischemic change.  There is no evidence of acute major vascular territory infarct, hemorrhage, or mass.  There is no hydrocephalus.  There are no abnormal extra-axial fluid collections.  The paranasal sinuses and mastoid air cells are clear, and there is no evidence of calvarial fracture.  The visualized soft tissues are remarkable for induration overlying the posterior occipital region on the right  near the vertex.  Additional induration is noted overlying the left maxillary sinus/zygomatic region.  The globes are intact.  No postseptal edema..                                       X-Ray Forearm Left (Final result)  Result time 02/10/24 13:21:23      Final result by Ioana Brock MD (02/10/24 13:21:23)                   Impression:      Significant soft tissue edema dorsal aspect of the forearm, contusion or cellulitis could have this appearance      Electronically signed by: Ioana Brock MD  Date:    02/10/2024  Time:    13:21               Narrative:    EXAMINATION:  XR FOREARM LEFT    CLINICAL HISTORY:  Pain, unspecified    TECHNIQUE:  AP and lateral views of the left forearm were performed.    COMPARISON:  None    FINDINGS:  Normal alignment, no fracture seen, no osseous lesion seen.    There is significant soft tissue edema dorsal forearm region.    No soft tissue air.  No foreign body.    Small osteophyte at the coronoid process, likely degenerative.  No advanced degenerative change at the wrist joint.                                       X-Ray Hand 3 View Left (Final result)  Result time 02/10/24 13:13:00      Final result by Ioana Brock MD (02/10/24 13:13:00)                   Impression:      As above      Electronically signed by: Ioana Brock MD  Date:    02/10/2024  Time:    13:13               Narrative:    EXAMINATION:  XR HAND COMPLETE 3 VIEW LEFT    CLINICAL HISTORY:  pain;.    TECHNIQUE:  PA, lateral, and oblique views of the left hand were performed.    COMPARISON:  None    FINDINGS:  Normal mineralization and alignment.  No acute fracture seen, no osseous lesion seen.  No advanced degenerative change.  Significant soft tissue edema dorsal aspect of the forearm.  No soft tissue air, no foreign body.                                       X-Ray Wrist Complete Left (Final result)  Result time 02/10/24 13:15:53      Final result by Shon Boles MD  (02/10/24 13:15:53)                   Impression:      1. No convincing acute displaced fracture or dislocation of the wrist allowing for positioning.  There is diffuse edema about the dorsal aspect of the hand and dorsal forearm.      Electronically signed by: Shon Boles MD  Date:    02/10/2024  Time:    13:15               Narrative:    EXAMINATION:  XR WRIST COMPLETE 3 VIEWS LEFT    CLINICAL HISTORY:  Pain, unspecified    TECHNIQUE:  PA, lateral, and oblique views of the left wrist were performed.    COMPARISON:  11/02/2016    FINDINGS:  Please note, positioning is suboptimal.    Allowing for the above, no convincing acute displaced fracture or dislocation of the wrist.  No radiopaque foreign body.  There is edema about the dorsal aspect of the wrist.  There are degenerative changes of the 1st carpal metacarpal joint.                                       X-Ray Knee 3 View Left (Final result)  Result time 02/10/24 13:09:47      Final result by Shon Boles MD (02/10/24 13:09:47)                   Impression:      1. No acute displaced fracture or dislocation of the knee.      Electronically signed by: Shon Boles MD  Date:    02/10/2024  Time:    13:09               Narrative:    EXAMINATION:  XR KNEE 3 VIEW LEFT    CLINICAL HISTORY:  Pain, unspecified    TECHNIQUE:  AP, lateral, and Merchant views of the left knee were performed.    COMPARISON:  03/06/2023    FINDINGS:  Three views left knee.    There is a well corticated ossific/calcific focus along the lateral aspect of the medial femoral condyle, stable, suggesting sequela of prior injury.  No convincing acute displaced fracture or dislocation of the knee.  No radiopaque foreign body.  No large knee joint effusion.                                       Medications   azithromycin tablet 500 mg (500 mg Oral Not Given 2/12/24 0900)   0.9%  NaCl infusion (0 mL/hr Intravenous Stopped 2/11/24 1808)   LORazepam injection 1 mg (1 mg Intravenous Given  2/10/24 1211)   sodium chloride 0.9% bolus 1,000 mL 1,000 mL (0 mLs Intravenous Stopped 2/10/24 1413)   sodium chloride 0.9% bolus 1,000 mL 1,000 mL (0 mLs Intravenous Stopped 2/10/24 1742)   phenobarbital (LUMINAL) 860.6 mg in sodium chloride 0.9% 100 mL IVPB (860.6 mg Intravenous Given 2/10/24 1429)   sodium chloride 0.9% 1,000 mL with mvi, (ADULT) no.4 with vit K 3,300 unit- 150 mcg/10 mL 10 mL, thiamine 100 mg, folic acid 1 mg infusion ( Intravenous New Bag 2/10/24 1557)   magnesium sulfate 2g in water 50mL IVPB (premix) (0 g Intravenous Stopped 2/10/24 2209)   sodium chloride 0.9% bolus 2,000 mL 2,000 mL (0 mLs Intravenous Stopped 2/11/24 1222)   cloNIDine tablet 0.1 mg (0.1 mg Oral Given 2/11/24 1351)   magnesium sulfate 2g in water 50mL IVPB (premix) (0 g Intravenous Stopped 2/12/24 1350)   calcium gluconate 1 g in NS IVPB (premixed) (0 g Intravenous Stopped 2/12/24 1250)   magnesium sulfate 2g in water 50mL IVPB (premix) (0 g Intravenous Stopped 2/13/24 1203)   magnesium sulfate 2g in water 50mL IVPB (premix) (0 g Intravenous Stopped 2/14/24 1145)     Medical Decision Making  63-year-old female who has a history of severe alcohol use disorder.  She was just admitted a week ago for similar.  Reports she is still drinking a large amount daily and drank quite a bit last night into this morning, last drink at 10:00 a.m..  She actually appears to be withdrawing already.  Reports at least 2 falls but does not really remember much.  On primary exam, ABCs intact, GCS 15, vital signs show tachycardia and tachypnea.  Blood pressure stable and afebrile and not hypoxic.  On secondary exam, does have evidence of head injury, tenderness to chest, tenderness to abdomen.  We will need to get CT head, neck because she is a poor historian and possibly intoxicated, chest/abdomen/pelvis to rule out fracture.  I am also very concerned about her left forearm.  Likely has a fracture but is unable to extend her thumb or her ring or  little finger.  We will get x-rays and likely will require ortho consult.  Labs including CPK because I am worried she was on the ground for quite awhile ordered.  1 mg Ativan ordered and fluids ordered for withdrawal.  Likely will require readmission sadly.    a discussed with Toxicology and the patient has been given a loading dose of 10 mix per kg of phenobarb.  CK is elevated 25,000 and new DEVANTE and elevated LFTs consistent with rhabdo.  She was given 2 L of fluids in the emergency department and we will need continued aggressive IV hydration.  Due to her history of severe alcohol withdrawal I consulted with critical Care who has evaluated the patient and believes she was stable for medicine admission.  She does not have any traumatic findings on her x-rays or CT scans.  There is a possibility of pneumonia but no fevers or hypoxia so we will let medicine decide on antibiotics.  As far as her left forearm, I have also consulted Orthopedic surgery to evaluate for nerve palsy versus compartment syndrome.    Amount and/or Complexity of Data Reviewed  Labs: ordered.  Radiology: ordered.    Risk  Prescription drug management.  Decision regarding hospitalization.                                      Clinical Impression:  Final diagnoses:  [R68.89] Withdrawal complaint  [R52] Pain  [T79.6XXA] Traumatic rhabdomyolysis, initial encounter (Primary)  [F10.930] Alcohol withdrawal syndrome without complication  [S50.12XA] Contusion of left forearm, initial encounter  [R10.12] Left upper quadrant abdominal pain          ED Disposition Condition    Admit Stable           Critical care time spent on the evaluation and treatment of severe organ dysfunction, review of pertinent labs and imaging studies, discussions with consulting providers and discussions with patient/family: 60 minutes.       Dorota Balderas MD  02/10/24 [id number]       Dorota Balderas MD  05/25/24 [id number]

## 2024-02-10 NOTE — HPI
Ms. Earl Abdul is a 64 y/o F with hx of EtOH use disorder c/b severe withdrawals, HTN, HLD, R breast cancer s/p bilateral mastectomy previously on Letrozole, CLL not currently on any tx, IDDM2, depression who presented to the ED for evaluation of recurrent falls s/o EtOH intoxication. Patient recently admitted to [facility]U 2/2-2/3 for management of EtOH withdrawal, however left AMA prior to completion of treatment. Since that time, patient reports continued heavy drinking (5th vodka each day since discharge). Patient states she drank heavily last night and passed out. She woke up today and subsequently fell and passed out again, reportedly hitting her head and her arm. Last drink 20 hours prior to admission. Reports that the drinking started after the loss of her son; has expressed interest in wanting to quit.     In the ED, patient afebrile, SBP 140s, tachy to 130s, tachynpeic, satting well on RA. UTox positive for alcohol. CBC showing leukocytosis to 15, hgb at baseline 10.6, plt 108. CMP with creatinine elevated to 2.3 from BL 1. CPK 25,000.

## 2024-02-10 NOTE — CONSULTS
MICU consult received. Please see separate consult note for assessment and plan.      Arturo Arreguin MD  [employer] EM PGY2

## 2024-02-10 NOTE — ASSESSMENT & PLAN NOTE
Patient was found to have thrombocytopenia, the likely etiology is secondary to alcohol use d/o but CLL is possible etiology as well, will monitor the platelets Daily. Will transfuse if platelet count is <50k (if undergoing surgical procedure or have active bleeding). Hold DVT prophylaxis if platelets are <50k. The patient's platelet results have been reviewed and are listed below.  Recent Labs   Lab 02/10/24  1212   *

## 2024-02-10 NOTE — ASSESSMENT & PLAN NOTE
Encountered a fall 02/09.  found her on the floor, and called EMS. Presenting with muscle pain and weakness.  CT head w/o acute intracranial pathology. CK >25K. UA with 3+ blood, 8 RBC.  2L NS bolus given in the ED.     Plan:   - NS infusion @200cc/hr over 24hrs  - repeat CK  - daily CMP  - monitor UOP  - replenish lytes appropriately  - Ortho consulted by ED for evaluation of L arm for c/o compartment syndrome. XR of L arm w/o acute fractures or dislocation; appreciate their recs  - ortho recommending MRI of L forearm

## 2024-02-10 NOTE — ED NOTES
Patient identifiers verified and correct for Earl Abdul  LOC: The patient is awake, alert and aware of environment with an appropriate affect, the patient is oriented x 3 and speaking appropriately.   APPEARANCE: Patient appears comfortable and in no acute distress, patient is clean and well groomed.  SKIN: The skin is warm and dry, color consistent with ethnicity, patient has normal skin turgor and moist mucus membranes, pt has abrasions to left wrist where her watch was. Pt has bruising to left knee  MUSCULOSKELETAL: Patient moving all extremities spontaneously, Pt reports pain to left arm and left leg.   RESPIRATORY: Airway is open and patent, respirations are spontaneous, patient has a normal effort and rate, no accessory muscle use noted, pt placed on continuous pulse ox with O2 sats noted at 97% on room air.  CARDIAC: Pt placed on cardiac monitor. Patient has a tachy rate and regular rhythm, no edema noted, capillary refill < 3 seconds.   GASTRO: Soft and non tender to palpation, no distention noted, normoactive bowel sounds present in all four quadrants. Pt states bowel movements have been regular.  : Pt denies any pain or frequency with urination.  NEURO: Pt opens eyes spontaneously, behavior appropriate to situation, follows commands, facial expression symmetrical, bilateral hand grasp equal and even, purposeful motor response noted, normal sensation in all extremities when touched with a finger.

## 2024-02-10 NOTE — AI DETERIORATION ALERT
"RAPID RESPONSE NURSE AI ALERT       AI alert received.    Chart Reviewed: 02/10/2024, 5:48 PM    MRN: [id number]  Bed: ED 36/36    Dx: <principal problem not specified>    Earl Abdul has a past medical history of Alcoholism, Anemia, Cancer of breast, CLL (chronic lymphocytic leukemia), Controlled type 2 diabetes mellitus without complication, without long-term current use of insulin, COPD (chronic obstructive pulmonary disease), Depression, Diverticulitis, Fatty liver, GERD (gastroesophageal reflux disease), Hyperlipidemia, Hypertension, Pancreatitis, Peptic ulcer disease, Polysubstance abuse, Posterior reversible encephalopathy syndrome, Sarcoidosis of lung, and Suicide attempt.    Last VS: BP (!) 140/67 (BP Location: Right arm, Patient Position: Sitting)   Pulse (!) 132   Temp 98.1 °F (36.7 °C) (Oral)   Resp (!) 22   Ht 5' 6" (1.676 m)   Wt 86.2 kg (190 lb)   SpO2 95%   BMI 30.67 kg/m²     24H Vital Sign Range:  Temp:  [98.1 °F (36.7 °C)]   Pulse:  [132]   Resp:  [22]   BP: (140)/(67)   SpO2:  [95 %]     Level of Consciousness (AVPU): alert    Recent Labs     02/10/24  1212   WBC 15.59*   HGB 10.6*   HCT 33.9*   *       Recent Labs     02/10/24  1212      K 4.2      CO2 20*   BUN 20   CREATININE 2.3*   *   MG 1.6        No results for input(s): "PH", "PCO2", "PO2", "HCO3", "POCSATURATED", "BE" in the last 72 hours.     OXYGEN:             MEWS score:      Charge RN, Brody  contacted. No concerns verbalized at this time. Instructed to call [phone] for further concerns or assistance.    Noemy Mcgovern RN        "

## 2024-02-10 NOTE — ASSESSMENT & PLAN NOTE
Although she has muscle pain and weakness present on the side onto which she fell, labs are c/w rhabdo.  However, her labs are not so deranged as to warrant treatment with any modality that is exclusive to the ICU.    - Continue treatment with IVF  - Consider orthopedic involvement if concern for compartment syndrome or occult fracture

## 2024-02-10 NOTE — ASSESSMENT & PLAN NOTE
Not on any treatment for CLL. Seen by Dr. Montano in the past. WBC elevated on admission.     Plan:   - daily CBC  - can consider heme-onc consult to potentially start therapy

## 2024-02-10 NOTE — ASSESSMENT & PLAN NOTE
Treatment with 1 mg Ativan followed by phenobarbital initiated in ED.  Pt with minor tremors and CIWA-Ar 15 during our exam, which was prior to administration of phenobarbital.    Can continue treatment on the floor, where benzodiazepine-based protocols are carried out regularly  If any staff is unfamiliar with phenobarbital, please direct them to [email or web address] starting at slide 31  If reason for floor refusal is unfamiliarity or hospital policy on phenobarbital, please transition to benzodiazepine-based protocol or consider Librium  Pt is clinically stable for floor admission, with no ICU-exclusive needs at this time

## 2024-02-10 NOTE — ASSESSMENT & PLAN NOTE
Alcohol use disorder, severe, dependence    Treatment with 1 mg Ativan followed by phenobarbital initiated in ED.    Plan:   - CT abdomen 02/10 with signs of nodular liver; pending US of liver with doppler for further eval  Alcohol Withdrawal precautions:  - Vitals q4h while awake  - CIWA monitoring  - Lorazepam (not on diazepam due to concerns of nodular liver) 2mg q2 PRN for CIWA >8  - Start Vitamin supplementation- Thiamine, Folic acid, Vit. B12, and Multivitamin qd  - seizure precautions  - fall precautions  - aspirations precautions  - addiction psych consulted; appreciate their assistance

## 2024-02-11 LAB
ALBUMIN SERPL BCP-MCNC: 3.2 G/DL (ref 3.5–5.2)
ALP SERPL-CCNC: 49 U/L (ref 55–135)
ALT SERPL W/O P-5'-P-CCNC: 150 U/L (ref 10–44)
ANION GAP SERPL CALC-SCNC: 10 MMOL/L (ref 8–16)
AST SERPL-CCNC: 453 U/L (ref 10–40)
BACTERIA UR CULT: NORMAL
BACTERIA UR CULT: NORMAL
BASOPHILS # BLD AUTO: 0 K/UL (ref 0–0.2)
BASOPHILS NFR BLD: 0 % (ref 0–1.9)
BILIRUB SERPL-MCNC: 0.8 MG/DL (ref 0.1–1)
BUN SERPL-MCNC: 19 MG/DL (ref 8–23)
CALCIUM SERPL-MCNC: 8 MG/DL (ref 8.7–10.5)
CHLORIDE SERPL-SCNC: 110 MMOL/L (ref 95–110)
CK SERPL-CCNC: ABNORMAL U/L (ref 20–180)
CO2 SERPL-SCNC: 19 MMOL/L (ref 23–29)
CREAT SERPL-MCNC: 2 MG/DL (ref 0.5–1.4)
DIFFERENTIAL METHOD BLD: ABNORMAL
EOSINOPHIL # BLD AUTO: 0 K/UL (ref 0–0.5)
EOSINOPHIL NFR BLD: 0.3 % (ref 0–8)
ERYTHROCYTE [DISTWIDTH] IN BLOOD BY AUTOMATED COUNT: 17.4 % (ref 11.5–14.5)
EST. GFR  (NO RACE VARIABLE): 27.2 ML/MIN/1.73 M^2
ESTIMATED AVG GLUCOSE: 126 MG/DL (ref 68–131)
GLUCOSE SERPL-MCNC: 112 MG/DL (ref 70–110)
HBA1C MFR BLD: 6 % (ref 4–5.6)
HCT VFR BLD AUTO: 31.1 % (ref 37–48.5)
HGB BLD-MCNC: 9.3 G/DL (ref 12–16)
IMM GRANULOCYTES # BLD AUTO: 0.02 K/UL (ref 0–0.04)
IMM GRANULOCYTES NFR BLD AUTO: 0.3 % (ref 0–0.5)
LYMPHOCYTES # BLD AUTO: 4 K/UL (ref 1–4.8)
LYMPHOCYTES NFR BLD: 51.7 % (ref 18–48)
MAGNESIUM SERPL-MCNC: 2.1 MG/DL (ref 1.6–2.6)
MCH RBC QN AUTO: 25.6 PG (ref 27–31)
MCHC RBC AUTO-ENTMCNC: 29.9 G/DL (ref 32–36)
MCV RBC AUTO: 86 FL (ref 82–98)
MONOCYTES # BLD AUTO: 0.6 K/UL (ref 0.3–1)
MONOCYTES NFR BLD: 7.3 % (ref 4–15)
NEUTROPHILS # BLD AUTO: 3.2 K/UL (ref 1.8–7.7)
NEUTROPHILS NFR BLD: 40.4 % (ref 38–73)
NRBC BLD-RTO: 0 /100 WBC
PHOSPHATE SERPL-MCNC: 3.4 MG/DL (ref 2.7–4.5)
PLATELET # BLD AUTO: 84 K/UL (ref 150–450)
PMV BLD AUTO: 10.9 FL (ref 9.2–12.9)
POCT GLUCOSE: 103 MG/DL (ref 70–110)
POCT GLUCOSE: 114 MG/DL (ref 70–110)
POCT GLUCOSE: 117 MG/DL (ref 70–110)
POCT GLUCOSE: 142 MG/DL (ref 70–110)
POCT GLUCOSE: 171 MG/DL (ref 70–110)
POTASSIUM SERPL-SCNC: 3.8 MMOL/L (ref 3.5–5.1)
PROT SERPL-MCNC: 5.8 G/DL (ref 6–8.4)
RBC # BLD AUTO: 3.63 M/UL (ref 4–5.4)
SODIUM SERPL-SCNC: 139 MMOL/L (ref 136–145)
WBC # BLD AUTO: 7.8 K/UL (ref 3.9–12.7)

## 2024-02-11 PROCEDURE — 27000221 HC OXYGEN, UP TO 24 HOURS

## 2024-02-11 PROCEDURE — 25000003 PHARM REV CODE 250

## 2024-02-11 PROCEDURE — 85025 COMPLETE CBC W/AUTO DIFF WBC: CPT

## 2024-02-11 PROCEDURE — 63600175 PHARM REV CODE 636 W HCPCS

## 2024-02-11 PROCEDURE — 84100 ASSAY OF PHOSPHORUS: CPT

## 2024-02-11 PROCEDURE — 20600001 HC STEP DOWN PRIVATE ROOM

## 2024-02-11 PROCEDURE — 63700000 PHARM REV CODE 250 ALT 637 W/O HCPCS

## 2024-02-11 PROCEDURE — 94761 N-INVAS EAR/PLS OXIMETRY MLT: CPT

## 2024-02-11 PROCEDURE — 82550 ASSAY OF CK (CPK): CPT

## 2024-02-11 PROCEDURE — 90792 PSYCH DIAG EVAL W/MED SRVCS: CPT | Mod: ,,, | Performed by: PSYCHIATRY & NEUROLOGY

## 2024-02-11 PROCEDURE — 83036 HEMOGLOBIN GLYCOSYLATED A1C: CPT

## 2024-02-11 PROCEDURE — 83735 ASSAY OF MAGNESIUM: CPT

## 2024-02-11 PROCEDURE — 25000003 PHARM REV CODE 250: Performed by: STUDENT IN AN ORGANIZED HEALTH CARE EDUCATION/TRAINING PROGRAM

## 2024-02-11 PROCEDURE — 36415 COLL VENOUS BLD VENIPUNCTURE: CPT

## 2024-02-11 PROCEDURE — 80053 COMPREHEN METABOLIC PANEL: CPT

## 2024-02-11 RX ORDER — HYDROMORPHONE HYDROCHLORIDE 1 MG/ML
0.5 INJECTION, SOLUTION INTRAMUSCULAR; INTRAVENOUS; SUBCUTANEOUS EVERY 6 HOURS PRN
Status: DISCONTINUED | OUTPATIENT
Start: 2024-02-11 | End: 2024-02-14 | Stop reason: HOSPADM

## 2024-02-11 RX ORDER — CLONIDINE HYDROCHLORIDE 0.1 MG/1
0.1 TABLET ORAL ONCE
Status: COMPLETED | OUTPATIENT
Start: 2024-02-11 | End: 2024-02-11

## 2024-02-11 RX ORDER — SODIUM CHLORIDE 9 MG/ML
INJECTION, SOLUTION INTRAVENOUS CONTINUOUS
Status: DISCONTINUED | OUTPATIENT
Start: 2024-02-11 | End: 2024-02-12

## 2024-02-11 RX ORDER — LORAZEPAM 1 MG/1
2 TABLET ORAL EVERY 8 HOURS
Status: DISCONTINUED | OUTPATIENT
Start: 2024-02-11 | End: 2024-02-14 | Stop reason: HOSPADM

## 2024-02-11 RX ORDER — PREDNISONE 20 MG/1
20 TABLET ORAL DAILY
Status: DISCONTINUED | OUTPATIENT
Start: 2024-02-11 | End: 2024-02-14 | Stop reason: HOSPADM

## 2024-02-11 RX ADMIN — LORAZEPAM 2 MG: 1 TABLET ORAL at 10:02

## 2024-02-11 RX ADMIN — HEPARIN SODIUM 5000 UNITS: 5000 INJECTION INTRAVENOUS; SUBCUTANEOUS at 05:02

## 2024-02-11 RX ADMIN — SODIUM CHLORIDE: 9 INJECTION, SOLUTION INTRAVENOUS at 10:02

## 2024-02-11 RX ADMIN — FOLIC ACID 1 MG: 1 TABLET ORAL at 10:02

## 2024-02-11 RX ADMIN — HEPARIN SODIUM 5000 UNITS: 5000 INJECTION INTRAVENOUS; SUBCUTANEOUS at 09:02

## 2024-02-11 RX ADMIN — HEPARIN SODIUM 5000 UNITS: 5000 INJECTION INTRAVENOUS; SUBCUTANEOUS at 01:02

## 2024-02-11 RX ADMIN — CLONIDINE HYDROCHLORIDE 0.1 MG: 0.1 TABLET ORAL at 01:02

## 2024-02-11 RX ADMIN — PREDNISONE 20 MG: 20 TABLET ORAL at 10:02

## 2024-02-11 RX ADMIN — THERA TABS 1 TABLET: TAB at 10:02

## 2024-02-11 RX ADMIN — SODIUM CHLORIDE 2000 ML: 9 INJECTION, SOLUTION INTRAVENOUS at 10:02

## 2024-02-11 RX ADMIN — ESCITALOPRAM OXALATE 10 MG: 10 TABLET ORAL at 10:02

## 2024-02-11 RX ADMIN — SODIUM BICARBONATE: 84 INJECTION, SOLUTION INTRAVENOUS at 06:02

## 2024-02-11 RX ADMIN — AZITHROMYCIN DIHYDRATE 500 MG: 250 TABLET ORAL at 10:02

## 2024-02-11 RX ADMIN — LORAZEPAM 2 MG: 1 TABLET ORAL at 09:02

## 2024-02-11 RX ADMIN — HYDROMORPHONE HYDROCHLORIDE 0.5 MG: 1 INJECTION, SOLUTION INTRAMUSCULAR; INTRAVENOUS; SUBCUTANEOUS at 06:02

## 2024-02-11 RX ADMIN — CEFTRIAXONE 1 G: 1 INJECTION, POWDER, FOR SOLUTION INTRAMUSCULAR; INTRAVENOUS at 06:02

## 2024-02-11 RX ADMIN — THIAMINE HCL TAB 100 MG 100 MG: 100 TAB at 10:02

## 2024-02-11 RX ADMIN — SODIUM CHLORIDE: 9 INJECTION, SOLUTION INTRAVENOUS at 12:02

## 2024-02-11 RX ADMIN — LEVOTHYROXINE SODIUM 75 MCG: 75 TABLET ORAL at 05:02

## 2024-02-11 NOTE — CONSULTS
"[facility] - Telemetry Stepdown  Orthopedics  Consult Note    Patient Name: Earl Abdul  MRN: [id number]  Admission Date: 2/10/2024  Hospital Length of Stay: 0 days  Attending Provider: Lloyd Whitley MD  Primary Care Provider: Andrew Rodriguez MD    Patient information was obtained from patient and ER records.     Inpatient consult to Orthopedic Surgery  Consult performed by: MAGALIE Boone MD  Consult ordered by: Dorota Balderas MD        Subjective:     Principal Problem:Rhabdomyolysis    Chief Complaint:   Chief Complaint   Patient presents with    Abdominal Pain    Fall    Arm Injury     EMS reports "heavy drinking last night"= fell at home/ got caught in bed frame, injured left arm/wrist        HPI: Earl Abdul is a 63 y.o. female w/PMH of alcohol use disorder (with significant withdrawal history), T2DM, COPD, GERD, fibromyalgia, sarcoidosis, breast Ca, CLL (no current treatment), HTN, HLD, hypothyroidism, who presents with left arm pain.  Patient reportedly passed out while drinking yesterday morning around 9am, woke up around 10pm, and passed out again.  States when she woke up the following morning, she had severe pain at her left forearm with associated numbness at her left hand.  Notably, patient has poor recollection of timeline but states she did hit her head when she initially passed out yesterday morning.  Endorses pain at her left knee but denies any other musculoskeletal pains.  Patient is right hand dominant, smokes around 1/2 pack of cigarettes daily, and is not on any blood thinners.      Past Medical History:   Diagnosis Date    Alcoholism     c/b alcohol withdrawl seizures 7/2017    Anemia     Cancer of breast 10/2020    s/p bilateral mastectomy for  T1b N0 stage IA breast cancer October 2020    CLL (chronic lymphocytic leukemia) 09/30/2022 6/22/22 - PB flow cytometry  Immunophenotyping of peripheral blood detects a distinct kappa light chain restricted monoclonal B-cell " population  (calculated at 2.44x10 9 /L, from the most recent CBC showing a total WBC of  7.35 K/uL with 61% total lymphocytes)  with a CLL phenotype (coexpression of CD19, CD5, CD23 and dim CD20). CD22 (FITC), FMC-7 and CD38 are negative in this population.    Controlled type 2 diabetes mellitus without complication, without long-term current use of insulin 11/30/2021    COPD (chronic obstructive pulmonary disease)     Depression     Diverticulitis     Fatty liver     GERD (gastroesophageal reflux disease)     Hyperlipidemia     Hypertension     Pancreatitis     Peptic ulcer disease     Polysubstance abuse     Posterior reversible encephalopathy syndrome     Sarcoidosis of lung     over 30 yrs ago    Suicide attempt        Past Surgical History:   Procedure Laterality Date    APPENDECTOMY      BILATERAL MASTECTOMY Bilateral 10/29/2020    Procedure: MASTECTOMY, BILATERAL;  Surgeon: Baylee Kevin MD;  Location: [facility] OR [address] [place] [address];  Service: General;  Laterality: Bilateral;    BREAST REVISION SURGERY Bilateral 2/11/2021    Procedure: BREAST REVISION SURGERY;  Surgeon: Scottie Johnson MD;  Location: [facility] OR [address] [place] [address];  Service: Plastics;  Laterality: Bilateral;    COLONOSCOPY N/A 7/28/2017    Procedure: COLONOSCOPY;  Surgeon: Aaron Alvarado MD;  Location: [facility];  Service: Endoscopy;  Laterality: N/A;    ESOPHAGOGASTRODUODENOSCOPY  10/7/2016, 11/6/2014    2016 - gastritis, duodenitis, 2014 erosive gastritis    ESOPHAGOGASTRODUODENOSCOPY N/A 2/11/2020    Procedure: ESOPHAGOGASTRODUODENOSCOPY (EGD);  Surgeon: Fawn Garrido MD;  Location: [facility];  Service: Endoscopy;  Laterality: N/A;    ESOPHAGOGASTRODUODENOSCOPY N/A 4/19/2021    Procedure: EGD (ESOPHAGOGASTRODUODENOSCOPY);  Surgeon: Paramjit Martino MD;  Location: [facility];  Service: Endoscopy;  Laterality: N/A;    FLEXIBLE SIGMOIDOSCOPY  11/06/2014    colitis    HYSTERECTOMY      IMPLANTATION OF PERMANENT SACRAL NERVE STIMULATOR N/A 7/12/2022     Procedure: INSERTION, NEUROSTIMULATOR, PERMANENT, SACRAL;  Surgeon: Juaquin Edwards MD;  Location: [address];  Service: Urology;  Laterality: N/A;  1hr    INJECTION FOR SENTINEL NODE IDENTIFICATION Right 10/29/2020    Procedure: INJECTION, FOR SENTINEL NODE IDENTIFICATION;  Surgeon: Baylee Kevin MD;  Location: [address];  Service: General;  Laterality: Right;    INJECTION OF JOINT Right 10/10/2019    Procedure: Injection, Joint RIGHT ILIOPSOAS BURSA/TENDON INJECTION AND RIGHT GLUTEAL TENDON INJECTION WITH STEROID AND LIDOCAINE;  Surgeon: Guillaume Rico MD;  Location: [facility] PAIN MGT;  Service: Pain Management;  Laterality: Right;  NEEDS CONSENT    INSERTION OF BREAST TISSUE EXPANDER Bilateral 10/29/2020    Procedure: INSERTION, TISSUE EXPANDER, BREAST;  Surgeon: Scottie Johnson MD;  Location: [address];  Service: Plastics;  Laterality: Bilateral;  Right breast: 1082 g  Left breast: 1076 g    LIPOSUCTION Bilateral 2/11/2021    Procedure: LIPOSUCTION;  Surgeon: Scottie Johnson MD;  Location: [address];  Service: Plastics;  Laterality: Bilateral;    mediastenoscopy      REPLACEMENT OF IMPLANT OF BREAST Bilateral 2/11/2021    Procedure: REPLACEMENT, IMPLANT, BREAST;  Surgeon: Scottie Johnson MD;  Location: [address];  Service: Plastics;  Laterality: Bilateral;    SENTINEL LYMPH NODE BIOPSY Right 10/29/2020    Procedure: BIOPSY, LYMPH NODE, SENTINEL;  Surgeon: Baylee Kevin MD;  Location: [address];  Service: General;  Laterality: Right;    TONSILLECTOMY N/A 1970    TUBAL LIGATION         Review of patient's allergies indicates:   Allergen Reactions    Lortab [hydrocodone-acetaminophen] Itching    Promethazine Itching and Other (See Comments)       Current Facility-Administered Medications   Medication    0.9%  NaCl infusion    azithromycin tablet 500 mg    cefTRIAXone (Rocephin) 1 g in dextrose 5 % in water (D5W) 100 mL IVPB (MB+)    dextrose 10% bolus 125  mL 125 mL    dextrose 10% bolus 250 mL 250 mL    [START ON 2/11/2024] EScitalopram oxalate tablet 10 mg    [START ON 2/11/2024] folic acid tablet 1 mg    glucagon (human recombinant) injection 1 mg    glucose chewable tablet 16 g    glucose chewable tablet 24 g    heparin (porcine) injection 5,000 Units    insulin aspart U-100 pen 0-5 Units    iohexoL (OMNIPAQUE 350) injection 100 mL    [START ON 2/11/2024] levothyroxine tablet 75 mcg    LORazepam injection 2 mg    magnesium sulfate 2g in water 50mL IVPB (premix)    melatonin tablet 6 mg    [START ON 2/11/2024] multivitamin tablet    naloxone 0.4 mg/mL injection 0.02 mg    sodium chloride 0.9% flush 10 mL    thiamine tablet 100 mg     Facility-Administered Medications Ordered in Other Encounters   Medication    albuterol sulfate nebulizer solution 2.5 mg     Family History       Problem Relation (Age of Onset)    Breast cancer Maternal Aunt, Daughter    Colon cancer Maternal Uncle    Diabetes Father, Mother    Heart attack Father    Hypertension Father, Mother          Tobacco Use    Smoking status: Every Day     Current packs/day: 0.00     Average packs/day: 0.5 packs/day for 30.0 years (15.0 ttl pk-yrs)     Types: Vaping with nicotine, Cigarettes     Start date: 2/1/1991     Last attempt to quit: 2/1/2021     Years since quitting: 3.0    Smokeless tobacco: Never    Tobacco comments:     Patient is currently smoking 10 cigarettes a day, declines nicotine patches   Substance and Sexual Activity    Alcohol use: Yes     Comment: vodka daily (half a regular bottle) for 4 days    Drug use: Yes     Types: Marijuana     Comment: gummies    Sexual activity: Yes     Birth control/protection: Surgical     ROS  Constitutional: Denies fever/chills  Eyes: Denies change in vision  ENT: Denies sore throat or rhinorrhea   Respiratory: Denies shortness of breath or cough  Cardiovascular: Denies chest pain or palpitations  Gastrointestinal: Denies abdominal pain, nausea, or  "vomiting  Genitourinary: Denies dysuria and flank pain  Skin: Denies new rash or skin lesions   Allergic/Immunologic: Denies adverse reactions to current medications  Neurological: Denies headaches or dizziness  Musculoskeletal: see HPI    Objective:     Vital Signs (Most Recent):  Temp: 98.1 °F (36.7 °C) (02/10/24 1125)  Pulse: (!) 116 (02/10/24 1645)  Resp: 20 (02/10/24 1645)  BP: (!) 109/56 (02/10/24 1645)  SpO2: 96 % (02/10/24 1645) Vital Signs (24h Range):  Temp:  [98.1 °F (36.7 °C)] 98.1 °F (36.7 °C)  Pulse:  [116-132] 116  Resp:  [20-22] 20  SpO2:  [95 %-96 %] 96 %  BP: (109-140)/(56-67) 109/56     Weight: 86.2 kg (190 lb)  Height: 5' 6" (167.6 cm)  Body mass index is 30.67 kg/m².    No intake or output data in the 24 hours ending 02/10/24 2032     Ortho/SPM Exam  Physical Exam:  General:  no apparent distress, WDWN  HENT:  NCAT, Bilateral ears/eyes normal  CV:  Normal pulses, color, and cap refill  Lungs:  Normal respiratory effort  Neuro: No FND, awake, alert  Psych:  Oriented to Person and Place    MSK:       RUE:  Inspection: Skin intact throughout, no swelling, no effusions, no ecchymosis   Palpation: Non-TTP throughout, no palpable abnormality.   ROM: AROM and PROM of the shoulder, elbow, wrist, and hand intact without pain.   Neuro: AIN/PIN/Radial/Median/Ulnar Nerves assessed in isolation without deficit.   SILT throughout.    Vascular: Radial artery palpated 2+. Capillary refill <3s.         LUE:  Inspection: Blistering present at the ulnar forearm  Pressure injury at dorsal wrist with swelling along dorsum of hand    Palpation: TTP at elbow and forearm; otherwise non-TTP throughout.  Palpable effusion at elbow   Palpable swelling of forearm, with superficial volar/dorsal/mobile wad compartments easily compressible    ROM: AROM and PROM of the wrist intact without pain.  Passive ROM at shoulder intact without pain   Painful active and passive range of motion at the elbow   Unable to actively extend " left thumb/ring/small fingers; ROM at digits otherwise intact without pain  No pain with passive extension of digits   No evidence of dislocation   Neuro: AIN/Radial/Median nerves assessed in isolation without deficit  Unable to extend thumb/ring/small fingers   Unable to adduct/abduct fingers  Decreased sensation in all nerve distributions of hand    Vascular: Radial artery palpated 2+. Capillary refill <3s.         LLE:  Inspection: Skin intact throughout  Erythematous rash consistent with erythema nodosum present at knee   Palpation: Non-TTP throughout. No palpable abnormality.   ROM: AROM and PROM of the hip, knee, ankle, and foot intact without pain.   Neuro: TA/EHL/Gastroc/FHL assessed in isolation without deficit.   SILT throughout.    Vascular: Foot is WWP. Capillary refill <3s.         RLE:  Inspection: Skin intact throughout  Erythematous rash consistent with erythema nodosum present at knee   Palpation: Non-TTP throughout. No palpable abnormality.   ROM: AROM and PROM of the hip, knee, ankle, and foot intact without pain.   Neuro: TA/EHL/Gastroc/FHL assessed in isolation without deficit.   SILT throughout.    Vascular: Foot is WWP. Capillary refill <3s.          Spine/pelvis/axial body:  No tenderness to palpation of cervical, thoracic, or lumbar spine  Stable and without pain with direct anterior pressure over ASIS.  No chest wall or abdominal tenderness  Muscle tone normal      Significant Labs: All pertinent labs within the past 24 hours have been reviewed.    Significant Imaging: I have reviewed and interpreted all pertinent imaging results/findings.  Xrays of the left elbow, forearm, wrist, and hand show no acute fractures or dislocations.  Xrays of left elbow notable for posterior fat pad sign indicating effusion   Assessment/Plan:     Left forearm pain  Earl Abdul is a 63 y.o. female w/PMH of alcohol use disorder (with significant withdrawal history), T2DM, COPD, GERD, fibromyalgia,  sarcoidosis, breast Ca, CLL (no current treatment), HTN, HLD, hypothyroidism, who presents with left forearm pain with associated left hand numbness/weakness.  Patient presented after passing out for approximately 12 hours on her left side secondary to alcohol intoxication.  Labs on [place] [address], WBC 15.59, ESR 10, CRP 75.9.  On physical exam, patient has decreased sensation globally at the left hand, weakness with extension of the digits (most notably at the thumb/ring/small finger), and weakness with abduction/adduction of the digits of the left hand.  Compartments soft and easily compressible.  She also has painful range of motion of the left elbow with an associated effusion.  Suspect her current presentation may be secondary to delayed presentation compartment syndrome versus pressure-related injury of the posterior interosseous and ulnar nerves.      No acute orthopedic intervention is warranted at this time.  MRI of the left elbow ordered to evaluate for possible occult fracture.  Recommend sling for comfort support, multimodal pain regimen, and daily PT/OT.  Further recommendations will be made pending MRI results.            MAGALIE Boone MD  Orthopedics  [facility] - Telemetry Stepdown    I have personally seen and examined the patient myself and her arm and forearm and hand compartments are soft and compressible, she does have some swelling, but once again soft to compression, no significant pain with compression or pain with passive stretch of the fingers. She tells me she has sensation but it is altered throughout the hand. She can minimally extend her MCPs but cannot fully straighten her fingers. She has tenderness in elbow and so MRI is ordered to rule out occult fracture. Spoke with her  as well. She has likely nerve damage and muscle damage from being down and intoxicated. No acute concern for compartment syndrome. I am not sure if she will regain any function.  Encouraged frequent digit range of motion to prevent contractures. Splint for soft tissue rest and ice and elevation. Will follow up MRI when completed.     Tom Tello MD

## 2024-02-11 NOTE — ASSESSMENT & PLAN NOTE
Patient was found to have thrombocytopenia, the likely etiology is secondary to alcohol use d/o but CLL is possible etiology as well, will monitor the platelets Daily. Will transfuse if platelet count is <50k (if undergoing surgical procedure or have active bleeding). Hold DVT prophylaxis if platelets are <30k due to hx of malignancy. The patient's platelet results have been reviewed and are listed below.  Recent Labs   Lab 02/11/24  0539   PLT 84*     HIT score 02/11 is zero

## 2024-02-11 NOTE — SUBJECTIVE & OBJECTIVE
Past Medical History:   Diagnosis Date    Alcoholism     c/b alcohol withdrawl seizures 7/2017    Anemia     Cancer of breast 10/2020    s/p bilateral mastectomy for  T1b N0 stage IA breast cancer October 2020    CLL (chronic lymphocytic leukemia) 09/30/2022 6/22/22 - PB flow cytometry  Immunophenotyping of peripheral blood detects a distinct kappa light chain restricted monoclonal B-cell population  (calculated at 2.44x10 9 /L, from the most recent CBC showing a total WBC of  7.35 K/uL with 61% total lymphocytes)  with a CLL phenotype (coexpression of CD19, CD5, CD23 and dim CD20). CD22 (FITC), FMC-7 and CD38 are negative in this population.    Controlled type 2 diabetes mellitus without complication, without long-term current use of insulin 11/30/2021    COPD (chronic obstructive pulmonary disease)     Depression     Diverticulitis     Fatty liver     GERD (gastroesophageal reflux disease)     Hyperlipidemia     Hypertension     Pancreatitis     Peptic ulcer disease     Polysubstance abuse     Posterior reversible encephalopathy syndrome     Sarcoidosis of lung     over 30 yrs ago    Suicide attempt        Past Surgical History:   Procedure Laterality Date    APPENDECTOMY      BILATERAL MASTECTOMY Bilateral 10/29/2020    Procedure: MASTECTOMY, BILATERAL;  Surgeon: Baylee Kevin MD;  Location: [address];  Service: General;  Laterality: Bilateral;    BREAST REVISION SURGERY Bilateral 2/11/2021    Procedure: BREAST REVISION SURGERY;  Surgeon: Scottie Johnson MD;  Location: [address];  Service: Plastics;  Laterality: Bilateral;    COLONOSCOPY N/A 7/28/2017    Procedure: COLONOSCOPY;  Surgeon: Aaron Alvarado MD;  Location: [facility];  Service: Endoscopy;  Laterality: N/A;    ESOPHAGOGASTRODUODENOSCOPY  10/7/2016, 11/6/2014    2016 - gastritis, duodenitis, 2014 erosive gastritis    ESOPHAGOGASTRODUODENOSCOPY N/A 2/11/2020    Procedure: ESOPHAGOGASTRODUODENOSCOPY (EGD);  Surgeon: Fawn Mon  MD Hema;  Location: [facility];  Service: Endoscopy;  Laterality: N/A;    ESOPHAGOGASTRODUODENOSCOPY N/A 4/19/2021    Procedure: EGD (ESOPHAGOGASTRODUODENOSCOPY);  Surgeon: Paramjit Martino MD;  Location: [facility] ENDO;  Service: Endoscopy;  Laterality: N/A;    FLEXIBLE SIGMOIDOSCOPY  11/06/2014    colitis    HYSTERECTOMY      IMPLANTATION OF PERMANENT SACRAL NERVE STIMULATOR N/A 7/12/2022    Procedure: INSERTION, NEUROSTIMULATOR, PERMANENT, SACRAL;  Surgeon: Juaquin Edwards MD;  Location: [address];  Service: Urology;  Laterality: N/A;  1hr    INJECTION FOR SENTINEL NODE IDENTIFICATION Right 10/29/2020    Procedure: INJECTION, FOR SENTINEL NODE IDENTIFICATION;  Surgeon: Baylee Kevin MD;  Location: [address];  Service: General;  Laterality: Right;    INJECTION OF JOINT Right 10/10/2019    Procedure: Injection, Joint RIGHT ILIOPSOAS BURSA/TENDON INJECTION AND RIGHT GLUTEAL TENDON INJECTION WITH STEROID AND LIDOCAINE;  Surgeon: Guillaume Rico MD;  Location: [facility] PAIN MGT;  Service: Pain Management;  Laterality: Right;  NEEDS CONSENT    INSERTION OF BREAST TISSUE EXPANDER Bilateral 10/29/2020    Procedure: INSERTION, TISSUE EXPANDER, BREAST;  Surgeon: Scottie Johnson MD;  Location: [address];  Service: Plastics;  Laterality: Bilateral;  Right breast: 1082 g  Left breast: 1076 g    LIPOSUCTION Bilateral 2/11/2021    Procedure: LIPOSUCTION;  Surgeon: Scottie Johnson MD;  Location: [address];  Service: Plastics;  Laterality: Bilateral;    mediastenoscopy      REPLACEMENT OF IMPLANT OF BREAST Bilateral 2/11/2021    Procedure: REPLACEMENT, IMPLANT, BREAST;  Surgeon: Scottie Johnson MD;  Location: [facility] OR [address] [place] [address];  Service: Plastics;  Laterality: Bilateral;    SENTINEL LYMPH NODE BIOPSY Right 10/29/2020    Procedure: BIOPSY, LYMPH NODE, SENTINEL;  Surgeon: Baylee Kevin MD;  Location: [address];  Service: General;  Laterality: Right;    TONSILLECTOMY N/A 1970    TUBAL  "LIGATION         Review of patient's allergies indicates:   Allergen Reactions    Lortab [hydrocodone-acetaminophen] Itching    Promethazine Itching and Other (See Comments)       Current Facility-Administered Medications on File Prior to Encounter   Medication    albuterol sulfate nebulizer solution 2.5 mg     Current Outpatient Medications on File Prior to Encounter   Medication Sig    baclofen (LIORESAL) 5 mg Tab tablet Take 1 tablet (5 mg total) by mouth 3 (three) times daily.    blood sugar diagnostic Strp Use to check blood glucose 4 times daily    blood-glucose meter Misc Use to check blood glucose 4 times daily    EScitalopram oxalate (LEXAPRO) 10 MG tablet Take 1 tablet (10 mg total) by mouth once daily.    folic acid (FOLVITE) 1 MG tablet Take 1 tablet (1 mg total) by mouth once daily.    insulin (LANTUS SOLOSTAR U-100 INSULIN) glargine 100 units/mL SubQ pen Inject 15 Units into the skin 2 (two) times a day.    insulin lispro (HUMALOG KWIKPEN INSULIN) 100 unit/mL pen Inject 9 Units into the skin 3 (three) times daily with meals.    lancets Misc Use to check blood glucose 4 times daily    levothyroxine (SYNTHROID) 75 MCG tablet Take 1 tablet (75 mcg total) by mouth before breakfast.    loperamide (IMODIUM) 2 mg capsule Take 1 capsule (2 mg total) by mouth 4 (four) times daily as needed for Diarrhea.    losartan-hydrochlorothiazide 100-25 mg (HYZAAR) 100-25 mg per tablet Take 1 tablet by mouth once daily.    multivitamin Tab Take 1 tablet by mouth once daily.    pen needle, diabetic 32 gauge x 5/32" Ndle Use to inject insulin 5 (five) times daily.    traZODone (DESYREL) 100 MG tablet Take 2 tablets (200 mg total) by mouth every evening.     Family History       Problem Relation (Age of Onset)    Breast cancer Maternal Aunt, Daughter    Colon cancer Maternal Uncle    Diabetes Father, Mother    Heart attack Father    Hypertension Father, Mother          Tobacco Use    Smoking status: Every Day     Current " packs/day: 0.00     Average packs/day: 0.5 packs/day for 30.0 years (15.0 ttl pk-yrs)     Types: Vaping with nicotine, Cigarettes     Start date: 2/1/1991     Last attempt to quit: 2/1/2021     Years since quitting: 3.0    Smokeless tobacco: Never    Tobacco comments:     Patient is currently smoking 10 cigarettes a day, declines nicotine patches   Substance and Sexual Activity    Alcohol use: Yes     Comment: vodka daily (half a regular bottle) for 4 days    Drug use: Yes     Types: Marijuana     Comment: gummies    Sexual activity: Yes     Birth control/protection: Surgical     Review of Systems  Objective:     Vital Signs (Most Recent):  Temp: 98.1 °F (36.7 °C) (02/10/24 1125)  Pulse: (!) 116 (02/10/24 1645)  Resp: 20 (02/10/24 1645)  BP: (!) 109/56 (02/10/24 1645)  SpO2: 96 % (02/10/24 1645) Vital Signs (24h Range):  Temp:  [98.1 °F (36.7 °C)] 98.1 °F (36.7 °C)  Pulse:  [116-132] 116  Resp:  [20-22] 20  SpO2:  [95 %-96 %] 96 %  BP: (109-140)/(56-67) 109/56     Weight: 86.2 kg (190 lb)  Body mass index is 30.67 kg/m².     Physical Exam  Vitals and nursing note reviewed.   Constitutional:       Appearance: She is not diaphoretic.   HENT:      Head: Normocephalic. Contusion (occiput) present.      Right Ear: External ear normal.      Left Ear: External ear normal.      Nose: Nose normal.      Mouth/Throat:      Mouth: Mucous membranes are dry.      Tongue: Tongue does not deviate from midline.   Eyes:      General: No scleral icterus.     Extraocular Movements: Extraocular movements intact.      Pupils: Pupils are equal, round, and reactive to light.   Cardiovascular:      Rate and Rhythm: Regular rhythm. Tachycardia present.      Heart sounds: Normal heart sounds.   Pulmonary:      Effort: Pulmonary effort is normal. No respiratory distress.      Breath sounds: Normal breath sounds. No wheezing.   Abdominal:      General: Abdomen is flat.      Palpations: Abdomen is soft.      Tenderness: There is no guarding or  rebound. Negative signs include Mccollum's sign.   Musculoskeletal:         General: Swelling and tenderness present.      Cervical back: Normal range of motion.      Right lower leg: Edema present.      Left lower leg: Edema present.      Comments: Swelling and TTP to the LUE from elbow to wrist.    Skin:     Capillary Refill: Capillary refill takes less than 2 seconds.      Findings: Erythema (R medial knee) present.   Neurological:      General: No focal deficit present.      Mental Status: She is alert. She is disoriented.      GCS: GCS eye subscore is 4. GCS verbal subscore is 5.      Cranial Nerves: No facial asymmetry.      Motor: Weakness and tremor present. No seizure activity.      Comments: Slurred tangential speech.   Psychiatric:         Attention and Perception: Attention normal. She does not perceive auditory or visual hallucinations.         Speech: Speech normal.         Behavior: Behavior normal. Behavior is cooperative.              CRANIAL NERVES     CN III, IV, VI   Pupils are equal, round, and reactive to light.       Significant Labs: All pertinent labs within the past 24 hours have been reviewed.    Significant Imaging: I have reviewed all pertinent imaging results/findings within the past 24 hours.

## 2024-02-11 NOTE — SUBJECTIVE & OBJECTIVE
Past Medical History:   Diagnosis Date    Alcoholism     c/b alcohol withdrawl seizures 7/2017    Anemia     Cancer of breast 10/2020    s/p bilateral mastectomy for  T1b N0 stage IA breast cancer October 2020    CLL (chronic lymphocytic leukemia) 09/30/2022 6/22/22 - PB flow cytometry  Immunophenotyping of peripheral blood detects a distinct kappa light chain restricted monoclonal B-cell population  (calculated at 2.44x10 9 /L, from the most recent CBC showing a total WBC of  7.35 K/uL with 61% total lymphocytes)  with a CLL phenotype (coexpression of CD19, CD5, CD23 and dim CD20). CD22 (FITC), FMC-7 and CD38 are negative in this population.    Controlled type 2 diabetes mellitus without complication, without long-term current use of insulin 11/30/2021    COPD (chronic obstructive pulmonary disease)     Depression     Diverticulitis     Fatty liver     GERD (gastroesophageal reflux disease)     Hyperlipidemia     Hypertension     Pancreatitis     Peptic ulcer disease     Polysubstance abuse     Posterior reversible encephalopathy syndrome     Sarcoidosis of lung     over 30 yrs ago    Suicide attempt        Past Surgical History:   Procedure Laterality Date    APPENDECTOMY      BILATERAL MASTECTOMY Bilateral 10/29/2020    Procedure: MASTECTOMY, BILATERAL;  Surgeon: Baylee Kevin MD;  Location: [address];  Service: General;  Laterality: Bilateral;    BREAST REVISION SURGERY Bilateral 2/11/2021    Procedure: BREAST REVISION SURGERY;  Surgeon: Scottie Johnson MD;  Location: [address];  Service: Plastics;  Laterality: Bilateral;    COLONOSCOPY N/A 7/28/2017    Procedure: COLONOSCOPY;  Surgeon: Aaron Alvarado MD;  Location: [facility];  Service: Endoscopy;  Laterality: N/A;    ESOPHAGOGASTRODUODENOSCOPY  10/7/2016, 11/6/2014    2016 - gastritis, duodenitis, 2014 erosive gastritis    ESOPHAGOGASTRODUODENOSCOPY N/A 2/11/2020    Procedure: ESOPHAGOGASTRODUODENOSCOPY (EGD);  Surgeon: Fawn Mon  MD Hema;  Location: [facility];  Service: Endoscopy;  Laterality: N/A;    ESOPHAGOGASTRODUODENOSCOPY N/A 4/19/2021    Procedure: EGD (ESOPHAGOGASTRODUODENOSCOPY);  Surgeon: Paramjit Martino MD;  Location: [facility] [place] [facility];  Service: Endoscopy;  Laterality: N/A;    FLEXIBLE SIGMOIDOSCOPY  11/06/2014    colitis    HYSTERECTOMY      IMPLANTATION OF PERMANENT SACRAL NERVE STIMULATOR N/A 7/12/2022    Procedure: INSERTION, NEUROSTIMULATOR, PERMANENT, SACRAL;  Surgeon: Juaquin Edwards MD;  Location: [address];  Service: Urology;  Laterality: N/A;  1hr    INJECTION FOR SENTINEL NODE IDENTIFICATION Right 10/29/2020    Procedure: INJECTION, FOR SENTINEL NODE IDENTIFICATION;  Surgeon: Baylee Kevin MD;  Location: [address];  Service: General;  Laterality: Right;    INJECTION OF JOINT Right 10/10/2019    Procedure: Injection, Joint RIGHT ILIOPSOAS BURSA/TENDON INJECTION AND RIGHT GLUTEAL TENDON INJECTION WITH STEROID AND LIDOCAINE;  Surgeon: Guillaume Rico MD;  Location: [facility], [place] [facility]IN [facility];  Service: Pain Management;  Laterality: Right;  NEEDS CONSENT    INSERTION OF BREAST TISSUE EXPANDER Bilateral 10/29/2020    Procedure: INSERTION, TISSUE EXPANDER, BREAST;  Surgeon: Scottie Johnson MD;  Location: [address];  Service: Plastics;  Laterality: Bilateral;  Right breast: 1082 g  Left breast: 1076 g    LIPOSUCTION Bilateral 2/11/2021    Procedure: LIPOSUCTION;  Surgeon: Scottie Johnson MD;  Location: [address];  Service: Plastics;  Laterality: Bilateral;    mediastenoscopy      REPLACEMENT OF IMPLANT OF BREAST Bilateral 2/11/2021    Procedure: REPLACEMENT, IMPLANT, BREAST;  Surgeon: Scottie Johnson MD;  Location: [facility] OR [address] [place] [address];  Service: Plastics;  Laterality: Bilateral;    SENTINEL LYMPH NODE BIOPSY Right 10/29/2020    Procedure: BIOPSY, LYMPH NODE, SENTINEL;  Surgeon: Baylee Kevin MD;  Location: [address];  Service: General;  Laterality: Right;    TONSILLECTOMY N/A 1970    TUBAL  LIGATION         Review of patient's allergies indicates:   Allergen Reactions    Lortab [hydrocodone-acetaminophen] Itching    Promethazine Itching and Other (See Comments)       Current Facility-Administered Medications   Medication    0.9%  NaCl infusion    azithromycin tablet 500 mg    cefTRIAXone (Rocephin) 1 g in dextrose 5 % in water (D5W) 100 mL IVPB (MB+)    dextrose 10% bolus 125 mL 125 mL    dextrose 10% bolus 250 mL 250 mL    [START ON 2/11/2024] EScitalopram oxalate tablet 10 mg    [START ON 2/11/2024] folic acid tablet 1 mg    glucagon (human recombinant) injection 1 mg    glucose chewable tablet 16 g    glucose chewable tablet 24 g    heparin (porcine) injection 5,000 Units    insulin aspart U-100 pen 0-5 Units    iohexoL (OMNIPAQUE 350) injection 100 mL    [START ON 2/11/2024] levothyroxine tablet 75 mcg    LORazepam injection 2 mg    magnesium sulfate 2g in water 50mL IVPB (premix)    melatonin tablet 6 mg    [START ON 2/11/2024] multivitamin tablet    naloxone 0.4 mg/mL injection 0.02 mg    sodium chloride 0.9% flush 10 mL    thiamine tablet 100 mg     Facility-Administered Medications Ordered in Other Encounters   Medication    albuterol sulfate nebulizer solution 2.5 mg     Family History       Problem Relation (Age of Onset)    Breast cancer Maternal Aunt, Daughter    Colon cancer Maternal Uncle    Diabetes Father, Mother    Heart attack Father    Hypertension Father, Mother          Tobacco Use    Smoking status: Every Day     Current packs/day: 0.00     Average packs/day: 0.5 packs/day for 30.0 years (15.0 ttl pk-yrs)     Types: Vaping with nicotine, Cigarettes     Start date: 2/1/1991     Last attempt to quit: 2/1/2021     Years since quitting: 3.0    Smokeless tobacco: Never    Tobacco comments:     Patient is currently smoking 10 cigarettes a day, declines nicotine patches   Substance and Sexual Activity    Alcohol use: Yes     Comment: vodka daily (half a regular bottle) for 4 days    Drug  "use: Yes     Types: Marijuana     Comment: gummies    Sexual activity: Yes     Birth control/protection: Surgical     ROS  Constitutional: Denies fever/chills  Eyes: Denies change in vision  ENT: Denies sore throat or rhinorrhea   Respiratory: Denies shortness of breath or cough  Cardiovascular: Denies chest pain or palpitations  Gastrointestinal: Denies abdominal pain, nausea, or vomiting  Genitourinary: Denies dysuria and flank pain  Skin: Denies new rash or skin lesions   Allergic/Immunologic: Denies adverse reactions to current medications  Neurological: Denies headaches or dizziness  Musculoskeletal: see HPI    Objective:     Vital Signs (Most Recent):  Temp: 98.1 °F (36.7 °C) (02/10/24 1125)  Pulse: (!) 116 (02/10/24 1645)  Resp: 20 (02/10/24 1645)  BP: (!) 109/56 (02/10/24 1645)  SpO2: 96 % (02/10/24 1645) Vital Signs (24h Range):  Temp:  [98.1 °F (36.7 °C)] 98.1 °F (36.7 °C)  Pulse:  [116-132] 116  Resp:  [20-22] 20  SpO2:  [95 %-96 %] 96 %  BP: (109-140)/(56-67) 109/56     Weight: 86.2 kg (190 lb)  Height: 5' 6" (167.6 cm)  Body mass index is 30.67 kg/m².    No intake or output data in the 24 hours ending 02/10/24 2032     Ortho/SPM Exam  Physical Exam:  General:  no apparent distress, WDWN  HENT:  NCAT, Bilateral ears/eyes normal  CV:  Normal pulses, color, and cap refill  Lungs:  Normal respiratory effort  Neuro: No FND, awake, alert  Psych:  Oriented to Person and Place    MSK:       RUE:  Inspection: Skin intact throughout, no swelling, no effusions, no ecchymosis   Palpation: Non-TTP throughout, no palpable abnormality.   ROM: AROM and PROM of the shoulder, elbow, wrist, and hand intact without pain.   Neuro: AIN/PIN/Radial/Median/Ulnar Nerves assessed in isolation without deficit.   SILT throughout.    Vascular: Radial artery palpated 2+. Capillary refill <3s.         LUE:  Inspection: Blistering present at the ulnar forearm  Pressure injury at dorsal wrist with swelling along dorsum of hand  "   Palpation: TTP at elbow and forearm; otherwise non-TTP throughout.  Palpable effusion at elbow   Palpable swelling of forearm, with superficial volar/dorsal/mobile wad compartments easily compressible    ROM: AROM and PROM of the wrist intact without pain.  Passive ROM at shoulder intact without pain   Painful active and passive range of motion at the elbow   Unable to actively extend left thumb/ring/small fingers; ROM at digits otherwise intact without pain  No pain with passive extension of digits   No evidence of dislocation   Neuro: AIN/Radial/Median nerves assessed in isolation without deficit  Unable to extend thumb/ring/small fingers   Unable to adduct/abduct fingers  Decreased sensation in all nerve distributions of hand    Vascular: Radial artery palpated 2+. Capillary refill <3s.         LLE:  Inspection: Skin intact throughout  Erythematous rash consistent with erythema nodosum present at knee   Palpation: Non-TTP throughout. No palpable abnormality.   ROM: AROM and PROM of the hip, knee, ankle, and foot intact without pain.   Neuro: TA/EHL/Gastroc/FHL assessed in isolation without deficit.   SILT throughout.    Vascular: Foot is WWP. Capillary refill <3s.         RLE:  Inspection: Skin intact throughout  Erythematous rash consistent with erythema nodosum present at knee   Palpation: Non-TTP throughout. No palpable abnormality.   ROM: AROM and PROM of the hip, knee, ankle, and foot intact without pain.   Neuro: TA/EHL/Gastroc/FHL assessed in isolation without deficit.   SILT throughout.    Vascular: Foot is WWP. Capillary refill <3s.          Spine/pelvis/axial body:  No tenderness to palpation of cervical, thoracic, or lumbar spine  Stable and without pain with direct anterior pressure over ASIS.  No chest wall or abdominal tenderness  Muscle tone normal      Significant Labs: All pertinent labs within the past 24 hours have been reviewed.    Significant Imaging: I have reviewed and interpreted all  pertinent imaging results/findings.  Xrays of the left elbow, forearm, wrist, and hand show no acute fractures or dislocations.  Xrays of left elbow notable for posterior fat pad sign indicating effusion

## 2024-02-11 NOTE — HOSPITAL COURSE
Admitted for traumatic rhabdo in the setting of heavy alcohol use, DEVANTE secondary to rhabdo, and c/o alcohol withdrawal. Transitioned from Ativan 2mg IV PRN for CIWA>8 to scheduled Ativan 2mg PO q8 taper for c/o alcohol withdrawal with hx of complicated EtOH withdrawal admissions. CK >25k. Given 3L of NS upon admission; added 2L more of IVF to assist with washout of CK. Ortho tapped left elbow but not concerning for septic arthritis. Can use arm as tolerated per ortho. DEVANTE since resolved and CK continuing to improve daily. Seen by PT/OT and will d/c home w/ HH. Medically ready for d/c. Medications reconciled and outpt psych referral in place.

## 2024-02-11 NOTE — HPI
Earl Abdul is a 63 y.o. female w/PMH of alcohol use disorder (with significant withdrawal history), T2DM, COPD, GERD, fibromyalgia, sarcoidosis, breast Ca, CLL (no current treatment), HTN, HLD, hypothyroidism, who presents with left arm pain.  Patient reportedly passed out while drinking yesterday morning around 9am, woke up around 10pm, and passed out again.  States when she woke up the following morning, she had severe pain at her left forearm with associated numbness at her left hand.  Notably, patient has poor recollection of timeline but states she did hit her head when she initially passed out yesterday morning.  Endorses pain at her left knee but denies any other musculoskeletal pains.  Patient is right hand dominant, smokes around 1/2 pack of cigarettes daily, and is not on any blood thinners.

## 2024-02-11 NOTE — NURSING
Paged Med 5 team to get orders for CXR, KUB, and 2 view  Xray of head in order for radiologist to clear pt for MRI per radiology.  Dr. Polk stated he would pass on to day team.  No new orders.

## 2024-02-11 NOTE — ASSESSMENT & PLAN NOTE
Encountered a fall 02/09.  found her on the floor, and called EMS. Presenting with muscle pain and weakness.  CT head w/o acute intracranial pathology. CK >25K. UA with 3+ blood, 8 RBC.  2L NS bolus given in the ED.     Plan:   - NS infusion @200cc/hr over 24hrs  - repeat CK  - daily CMP  - monitor UOP  - strict I/O  - replenish lytes appropriately  - Ortho consulted by ED for evaluation of L arm for c/o compartment syndrome. XR of L arm w/o acute fractures or dislocation; appreciate their recs  - ortho recommending MRI of L forearm, and sling --> pending XR to assess for hardware  - IV dilaudid 0.5mg q6hr PRN for pain

## 2024-02-11 NOTE — PROGRESS NOTES
[facility] - Tele[facility] Medicine  Progress Note    Patient Name: Earl Abdul  MRN: [id number]  Patient Class: IP- Inpatient   Admission Date: 2/10/2024  Length of Stay: 1 days  Attending Physician: Lloyd Whitley MD  Primary Care Provider: Andrew Rodriguez MD        Subjective:     Principal Problem:Rhabdomyolysis        HPI:  Ms. Earl Abdul is a 62 y/o F with hx of EtOH use disorder c/b severe withdrawals, HTN, HLD, R breast cancer s/p bilateral mastectomy previously on Letrozole, CLL not currently on any tx, IDDM2, depression who presented to the ED for evaluation of recurrent falls s/o EtOH intoxication. Patient recently admitted to [facility] 2/2-2/3 for management of EtOH withdrawal, however left AMA prior to completion of treatment. Since that time, patient reports continued heavy drinking (5th vodka each day since discharge). Patient states she drank heavily last night and passed out. She woke up today and subsequently fell and passed out again, reportedly hitting her head and her arm. Last drink 20 hours prior to admission. Reports that the drinking started after the loss of her son; has expressed interest in wanting to quit.     In the ED, patient afebrile, SBP 140s, tachy to 130s, tachynpeic, satting well on RA. UTox positive for alcohol. CBC showing leukocytosis to 15, hgb at baseline 10.6, plt 108. CMP with creatinine elevated to 2.3 from BL 1. CPK 25,000.      Overview/Hospital Course:  Admitted for traumatic rhabdo in the setting of heavy alcohol use, DEVANTE secondary to rhabdo, and c/o alcohol withdrawal. Transitioned from Ativan 2mg IV PRN for CIWA>8 to scheduled Ativan 2mg PO q8 taper for c/o alcohol withdrawal with hx of complicated EtOH withdrawal admissions. CK >25k. Given 3L of NS upon admission; added 2L more of IVF to assist with washout of CK and improve DEVANTE. Cr trending down with IVF. Ortho recomme    No new subjective & objective note has been filed under this hospital  service since the last note was generated.      Assessment/Plan:      * Rhabdomyolysis  Encountered a fall 02/09.  found her on the floor, and called EMS. Presenting with muscle pain and weakness.  CT head w/o acute intracranial pathology. CK >25K. UA with 3+ blood, 8 RBC.  2L NS bolus given in the ED.     Plan:   - NS infusion for 24 hours completing today  - repeat CK  - daily CMP  - monitor UOP  - strict I/O  - replenish lytes appropriately  - Ortho consulted by ED for evaluation of L arm for c/o compartment syndrome. XR of L arm w/o acute fractures or dislocation; appreciate their recs  - ortho recommending MRI of L forearm, and sling --> pending XR to assess for hardware  - IV dilaudid 0.5mg q6hr PRN for pain        Alcohol withdrawal  Alcohol use disorder, severe, dependence    Treatment with 1 mg Ativan followed by phenobarbital initiated in ED.    Plan:   - CT abdomen 02/10 with signs of nodular liver; pending US of liver with doppler for further eval --> US findings consistent with cirrhosis morphology.  Alcohol Withdrawal precautions:  - Vitals q4h while awake  - CIWA monitoring  - Lorazepam (not on diazepam due to concerns of nodular liver) 2mg PO q8  - Lorazepam 2mg IV q2hr PRN for CIWA>8  - Start Vitamin supplementation- Thiamine, Folic acid, Vit. B12, and Multivitamin qd  - seizure precautions  - fall precautions  - aspirations precautions  - addiction psych consulted; appreciate their assistance          DEVANTE (acute kidney injury)  Patient with acute kidney injury/acute renal failure likely due to acute tubular necrosis caused by traumatic rhabdo  DEVANTE is currently worsening. Baseline creatinine  0.9  - Labs reviewed- Renal function/electrolytes with Estimated Creatinine Clearance: 31 mL/min (A) (based on SCr of 2 mg/dL (H)). according to latest data. Monitor urine output and serial BMP and adjust therapy as needed. Avoid nephrotoxins and renally dose meds for GFR listed above.    Plan:   - daily  "CMP  - monitor UOP  - strict I/O  - replenish lytes appropriately  - IVF for traumatic rhabdo  - hold losartan-HCTZ in the setting of DEVANTE     CLL (chronic lymphocytic leukemia)  Not on any treatment for CLL. Seen by Dr. Montano in the past. WBC elevated on admission.     Plan:   - daily CBC  - can consider heme-onc consult to potentially start therapy      Type 2 diabetes mellitus with hypoglycemia  Patient's FSGs are controlled on current medication regimen.  Last A1c reviewed-   Lab Results   Component Value Date    HGBA1C 5.5 11/04/2023     Most recent fingerstick glucose reviewed- No results for input(s): "POCTGLUCOSE" in the last 24 hours.  Current correctional scale  Low  Maintain anti-hyperglycemic dose as follows-   Antihyperglycemics (From admission, onward)      Start     Stop Route Frequency Ordered    02/10/24 1821  insulin aspart U-100 pen 0-5 Units         -- SubQ Every 6 hours PRN 02/10/24 1721          Hold Oral hypoglycemics while patient is in the hospital.    Plan:   - POCT glucose   - Diabetic diet   - BGL inpatient goal is 140-180    Hypothyroidism  - continue home synthroid      Malignant neoplasm of central portion of right breast in female, estrogen receptor positive  Last clinic visit 6/30/22  Letrozole started in February 2021.  Transitioned to anastrozole but patient not taking.      Thrombocytopenia  Patient was found to have thrombocytopenia, the likely etiology is secondary to alcohol use d/o but CLL is possible etiology as well, will monitor the platelets Daily. Will transfuse if platelet count is <50k (if undergoing surgical procedure or have active bleeding). Hold DVT prophylaxis if platelets are <30k due to hx of malignancy. The patient's platelet results have been reviewed and are listed below.  Recent Labs   Lab 02/11/24  0539   PLT 84*     HIT score 02/11 is zero      Hyperlipidemia  Chronic. Statin prescribed but patient not taking.       Sarcoidosis  Chronic. 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 Patient reports that the rash on her medical R ankle is similar to her previous erythema nodosum episode that required biopsy.     Plan:   - started pred 20mg daily for 7 days; unable to use first line therapy (NSAIDS) due to DEVANTE  - if persistent erythema, can consider rheum consult    Essential hypertension  Chronic, controlled. Latest blood pressure and vitals reviewed-     Temp:  [97.9 °F (36.6 °C)-98.7 °F (37.1 °C)]   Pulse:  [104-116]   Resp:  [20-23]   BP: (109-189)/(56-93)   SpO2:  [95 %-100 %] .   Home meds for hypertension were reviewed and noted below.   Hypertension Medications               losartan-hydrochlorothiazide 100-25 mg (HYZAAR) 100-25 mg per tablet Take 1 tablet by mouth once daily.            While in the hospital, will manage blood pressure as follows; Adjust home antihypertensive regimen as follows- hold home meds in the setting of DEVANTE secondary to rhabdo    Will utilize p.r.n. blood pressure medication only if patient's blood pressure greater than 180/110 and she develops symptoms such as worsening chest pain or shortness of breath.    Plan:   - given clonidine 0.1mg once to control BP  - holding off on starting home losartan-HCTZ given her DEVANTE      VTE Risk Mitigation (From admission, onward)           Ordered     heparin (porcine) injection 5,000 Units  Every 8 hours         02/10/24 1707     IP VTE HIGH RISK PATIENT  Once         02/10/24 1707     Place sequential compression device  Until discontinued         02/10/24 1707                    Discharge Planning   BECCA:      Code Status: Full Code   Is the patient medically ready for discharge?:     Reason for patient still in hospital (select all that apply): Patient trending condition, Treatment, and Consult recommendations                     Yris Maurer MD  Department of Hospital Medicine   [facility] - Telemetry Stepdown

## 2024-02-11 NOTE — CARE UPDATE
"RAPID RESPONSE NURSE CHART REVIEW        Chart Reviewed: 02/11/2024, 10:43 AM    MRN: [id number]  Bed: 8084/8018 A    Dx: Rhabdomyolysis    Earl Abdul has a past medical history of Alcoholism, Anemia, Cancer of breast, CLL (chronic lymphocytic leukemia), Controlled type 2 diabetes mellitus without complication, without long-term current use of insulin, COPD (chronic obstructive pulmonary disease), Depression, Diverticulitis, Fatty liver, GERD (gastroesophageal reflux disease), Hyperlipidemia, Hypertension, Pancreatitis, Peptic ulcer disease, Polysubstance abuse, Posterior reversible encephalopathy syndrome, Sarcoidosis of lung, and Suicide attempt.    Last VS: BP (!) 176/84 (BP Location: Right arm, Patient Position: Lying)   Pulse 104   Temp 97.9 °F (36.6 °C) (Oral)   Resp (!) 22   Ht 5' 6" (1.676 m)   Wt 86.2 kg (190 lb)   SpO2 96%   BMI 30.67 kg/m²     24H Vital Sign Range:  Temp:  [97.9 °F (36.6 °C)-98.2 °F (36.8 °C)]   Pulse:  [104-132]   Resp:  [20-23]   BP: (109-176)/(56-84)   SpO2:  [95 %-97 %]     Level of Consciousness (AVPU): alert    Recent Labs     02/10/24  1212 02/11/24  0539   WBC 15.59* 7.80   HGB 10.6* 9.3*   HCT 33.9* 31.1*   * 84*       Recent Labs     02/10/24  1212 02/11/24  0539    139   K 4.2 3.8    110   CO2 20* 19*   BUN 20 19   CREATININE 2.3* 2.0*   * 112*   PHOS  --  3.4   MG 1.6 2.1        No results for input(s): "PH", "PCO2", "PO2", "HCO3", "POCSATURATED", "BE" in the last 72 hours.     OXYGEN:             MEWS score: 3     Rounded with charge RNElizabeth  contacted for Hypertension and EtOH withdrawal. CN reports regular CIWA monitoring. Pt is oriented but does have hallucinations. Liver ultrasound ordered and completed. Safety and seizure precautions in place. No additional concerns verbalized at this time. Instructed to call [phone] for further concerns or assistance.    Lorin Blackwell RN        "

## 2024-02-11 NOTE — H&P
"  [facility] - Emergency Dept  [facility] Medicine  History & Physical    Patient Name: Earl Abdul  MRN: [id number]  Patient Class: IP- Inpatient  Admission Date: 2/10/2024  Attending Physician: Lloyd Whitley MD   Primary Care Provider: Andrew Rodriguez MD         Patient information was obtained from patient, past medical records, and ER records.     Subjective:     Principal Problem:Rhabdomyolysis    Chief Complaint:   Chief Complaint   Patient presents with    Abdominal Pain    Fall    Arm Injury     EMS reports "heavy drinking last night"= fell at home/ got caught in bed frame, injured left arm/wrist        HPI: Ms. Earl Abdul is a 62 y/o F with hx of EtOH use disorder c/b severe withdrawals, HTN, HLD, R breast cancer s/p bilateral mastectomy previously on Letrozole, CLL not currently on any tx, IDDM2, depression who presented to the ED for evaluation of recurrent falls s/o EtOH intoxication. [place] [facility] [place] for management of EtOH withdrawal, however left AMA prior to completion of treatment. Since that time, patient reports continued heavy drinking (5th vodka each day since discharge). Patient states she drank heavily last night and passed out. She woke up today and subsequently fell and passed out again, reportedly hitting her head and her arm. Last drink 20 hours prior to admission. Reports that the drinking started after the loss of her son; has expressed interest in wanting to quit.     In the ED, patient afebrile, SBP 140s, tachy to 130s, tachynpeic, satting well on RA. UTox positive for alcohol. CBC showing leukocytosis to 15, hgb at baseline 10.6, plt 108. CMP with creatinine elevated to 2.3 from BL 1. CPK 25,000.      Past Medical History:   Diagnosis Date    Alcoholism     c/b alcohol withdrawl seizures 7/2017    Anemia     Cancer of breast 10/2020    s/p bilateral mastectomy for  T1b N0 stage IA breast cancer October 2020    CLL (chronic lymphocytic leukemia) " 09/30/2022 6/22/22 - PB flow cytometry  Immunophenotyping of peripheral blood detects a distinct kappa light chain restricted monoclonal B-cell population  (calculated at 2.44x10 9 /L, from the most recent CBC showing a total WBC of  7.35 K/uL with 61% total lymphocytes)  with a CLL phenotype (coexpression of CD19, CD5, CD23 and dim CD20). CD22 (FITC), FMC-7 and CD38 are negative in this population.    Controlled type 2 diabetes mellitus without complication, without long-term current use of insulin 11/30/2021    COPD (chronic obstructive pulmonary disease)     Depression     Diverticulitis     Fatty liver     GERD (gastroesophageal reflux disease)     Hyperlipidemia     Hypertension     Pancreatitis     Peptic ulcer disease     Polysubstance abuse     Posterior reversible encephalopathy syndrome     Sarcoidosis of lung     over 30 yrs ago    Suicide attempt        Past Surgical History:   Procedure Laterality Date    APPENDECTOMY      BILATERAL MASTECTOMY Bilateral 10/29/2020    Procedure: MASTECTOMY, BILATERAL;  Surgeon: Baylee Kevin MD;  Location: [facility] OR [address] [place] [address];  Service: General;  Laterality: Bilateral;    BREAST REVISION SURGERY Bilateral 2/11/2021    Procedure: BREAST REVISION SURGERY;  Surgeon: Scottie Johnson MD;  Location: [facility] OR [address] [place] [address];  Service: Plastics;  Laterality: Bilateral;    COLONOSCOPY N/A 7/28/2017    Procedure: COLONOSCOPY;  Surgeon: Aaron Alvarado MD;  Location: [facility];  Service: Endoscopy;  Laterality: N/A;    ESOPHAGOGASTRODUODENOSCOPY  10/7/2016, 11/6/2014    2016 - gastritis, duodenitis, 2014 erosive gastritis    ESOPHAGOGASTRODUODENOSCOPY N/A 2/11/2020    Procedure: ESOPHAGOGASTRODUODENOSCOPY (EGD);  Surgeon: Fawn Garrido MD;  Location: [facility];  Service: Endoscopy;  Laterality: N/A;    ESOPHAGOGASTRODUODENOSCOPY N/A 4/19/2021    Procedure: EGD (ESOPHAGOGASTRODUODENOSCOPY);  Surgeon: Paramjit Martino MD;  Location: [facility];  Service: Endoscopy;  Laterality:  N/A;    FLEXIBLE SIGMOIDOSCOPY  11/06/2014    colitis    HYSTERECTOMY      IMPLANTATION OF PERMANENT SACRAL NERVE STIMULATOR N/A 7/12/2022    Procedure: INSERTION, NEUROSTIMULATOR, PERMANENT, SACRAL;  Surgeon: Juaquin Edwards MD;  Location: [address];  Service: Urology;  Laterality: N/A;  1hr    INJECTION FOR SENTINEL NODE IDENTIFICATION Right 10/29/2020    Procedure: INJECTION, FOR SENTINEL NODE IDENTIFICATION;  Surgeon: Baylee Kevin MD;  Location: [address];  Service: General;  Laterality: Right;    INJECTION OF JOINT Right 10/10/2019    Procedure: Injection, Joint RIGHT ILIOPSOAS BURSA/TENDON INJECTION AND RIGHT GLUTEAL TENDON INJECTION WITH STEROID AND LIDOCAINE;  Surgeon: Guillaume Rico MD;  Location: [facility];  Service: Pain Management;  Laterality: Right;  NEEDS CONSENT    INSERTION OF BREAST TISSUE EXPANDER Bilateral 10/29/2020    Procedure: INSERTION, TISSUE EXPANDER, BREAST;  Surgeon: Scottie Johnson MD;  Location: [address];  Service: Plastics;  Laterality: Bilateral;  Right breast: 1082 g  Left breast: 1076 g    LIPOSUCTION Bilateral 2/11/2021    Procedure: LIPOSUCTION;  Surgeon: Scottie Johnson MD;  Location: [address];  Service: Plastics;  Laterality: Bilateral;    mediastenoscopy      REPLACEMENT OF IMPLANT OF BREAST Bilateral 2/11/2021    Procedure: REPLACEMENT, IMPLANT, BREAST;  Surgeon: Scottie Johnson MD;  Location: [address];  Service: Plastics;  Laterality: Bilateral;    SENTINEL LYMPH NODE BIOPSY Right 10/29/2020    Procedure: BIOPSY, LYMPH NODE, SENTINEL;  Surgeon: Baylee Kevin MD;  Location: [address];  Service: General;  Laterality: Right;    TONSILLECTOMY N/A 1970    TUBAL LIGATION         Review of patient's allergies indicates:   Allergen Reactions    Lortab [hydrocodone-acetaminophen] Itching    Promethazine Itching and Other (See Comments)       Current Facility-Administered Medications on File Prior to Encounter  "  Medication    albuterol sulfate nebulizer solution 2.5 mg     Current Outpatient Medications on File Prior to Encounter   Medication Sig    baclofen (LIORESAL) 5 mg Tab tablet Take 1 tablet (5 mg total) by mouth 3 (three) times daily.    blood sugar diagnostic Strp Use to check blood glucose 4 times daily    blood-glucose meter Misc Use to check blood glucose 4 times daily    EScitalopram oxalate (LEXAPRO) 10 MG tablet Take 1 tablet (10 mg total) by mouth once daily.    folic acid (FOLVITE) 1 MG tablet Take 1 tablet (1 mg total) by mouth once daily.    insulin (LANTUS SOLOSTAR U-100 INSULIN) glargine 100 units/mL SubQ pen Inject 15 Units into the skin 2 (two) times a day.    insulin lispro (HUMALOG KWIKPEN INSULIN) 100 unit/mL pen Inject 9 Units into the skin 3 (three) times daily with meals.    lancets Misc Use to check blood glucose 4 times daily    levothyroxine (SYNTHROID) 75 MCG tablet Take 1 tablet (75 mcg total) by mouth before breakfast.    loperamide (IMODIUM) 2 mg capsule Take 1 capsule (2 mg total) by mouth 4 (four) times daily as needed for Diarrhea.    losartan-hydrochlorothiazide 100-25 mg (HYZAAR) 100-25 mg per tablet Take 1 tablet by mouth once daily.    multivitamin Tab Take 1 tablet by mouth once daily.    pen needle, diabetic 32 gauge x 5/32" Ndle Use to inject insulin 5 (five) times daily.    traZODone (DESYREL) 100 MG tablet Take 2 tablets (200 mg total) by mouth every evening.     Family History       Problem Relation (Age of Onset)    Breast cancer Maternal Aunt, Daughter    Colon cancer Maternal Uncle    Diabetes Father, Mother    Heart attack Father    Hypertension Father, Mother          Tobacco Use    Smoking status: Every Day     Current packs/day: 0.00     Average packs/day: 0.5 packs/day for 30.0 years (15.0 ttl pk-yrs)     Types: Vaping with nicotine, Cigarettes     Start date: 2/1/1991     Last attempt to quit: 2/1/2021     Years since quitting: 3.0    Smokeless tobacco: Never    " Tobacco comments:     Patient is currently smoking 10 cigarettes a day, declines nicotine patches   Substance and Sexual Activity    Alcohol use: Yes     Comment: vodka daily (half a regular bottle) for 4 days    Drug use: Yes     Types: Marijuana     Comment: gummies    Sexual activity: Yes     Birth control/protection: Surgical     Review of Systems  Objective:     Vital Signs (Most Recent):  Temp: 98.1 °F (36.7 °C) (02/10/24 1125)  Pulse: (!) 116 (02/10/24 1645)  Resp: 20 (02/10/24 1645)  BP: (!) 109/56 (02/10/24 1645)  SpO2: 96 % (02/10/24 1645) Vital Signs (24h Range):  Temp:  [98.1 °F (36.7 °C)] 98.1 °F (36.7 °C)  Pulse:  [116-132] 116  Resp:  [20-22] 20  SpO2:  [95 %-96 %] 96 %  BP: (109-140)/(56-67) 109/56     Weight: 86.2 kg (190 lb)  Body mass index is 30.67 kg/m².     Physical Exam  Vitals and nursing note reviewed.   Constitutional:       Appearance: She is not diaphoretic.   HENT:      Head: Normocephalic. Contusion (occiput) present.      Right Ear: External ear normal.      Left Ear: External ear normal.      Nose: Nose normal.      Mouth/Throat:      Mouth: Mucous membranes are dry.      Tongue: Tongue does not deviate from midline.   Eyes:      General: No scleral icterus.     Extraocular Movements: Extraocular movements intact.      Pupils: Pupils are equal, round, and reactive to light.   Cardiovascular:      Rate and Rhythm: Regular rhythm. Tachycardia present.      Heart sounds: Normal heart sounds.   Pulmonary:      Effort: Pulmonary effort is normal. No respiratory distress.      Breath sounds: Normal breath sounds. No wheezing.   Abdominal:      General: Abdomen is flat.      Palpations: Abdomen is soft.      Tenderness: There is no guarding or rebound. Negative signs include Mccollum's sign.   Musculoskeletal:         General: Swelling and tenderness present.      Cervical back: Normal range of motion.      Right lower leg: Edema present.      Left lower leg: Edema present.      Comments:  Swelling and TTP to the LUE from elbow to wrist.    Skin:     Capillary Refill: Capillary refill takes less than 2 seconds.      Findings: Erythema (R medial knee) present.   Neurological:      General: No focal deficit present.      Mental Status: She is alert. She is disoriented.      GCS: GCS eye subscore is 4. GCS verbal subscore is 5.      Cranial Nerves: No facial asymmetry.      Motor: Weakness and tremor present. No seizure activity.      Comments: Slurred tangential speech.   Psychiatric:         Attention and Perception: Attention normal. She does not perceive auditory or visual hallucinations.         Speech: Speech normal.         Behavior: Behavior normal. Behavior is cooperative.              CRANIAL NERVES     CN III, IV, VI   Pupils are equal, round, and reactive to light.       Significant Labs: All pertinent labs within the past 24 hours have been reviewed.    Significant Imaging: I have reviewed all pertinent imaging results/findings within the past 24 hours.  Assessment/Plan:     * Rhabdomyolysis  Encountered a fall 02/09.  found her on the floor, and called EMS. Presenting with muscle pain and weakness.  CT head w/o acute intracranial pathology. CK >25K. UA with 3+ blood, 8 RBC.  2L NS bolus given in the ED.     Plan:   - NS infusion @200cc/hr over 24hrs  - repeat CK  - daily CMP  - monitor UOP  - replenish lytes appropriately  - Ortho consulted by ED for evaluation of L arm for c/o compartment syndrome. XR of L arm w/o acute fractures or dislocation; appreciate their recs  - ortho recommending MRI of L forearm        Alcohol withdrawal  Alcohol use disorder, severe, dependence    Treatment with 1 mg Ativan followed by phenobarbital initiated in ED.    Plan:   - CT abdomen 02/10 with signs of nodular liver; pending US of liver with doppler for further eval  Alcohol Withdrawal precautions:  - Vitals q4h while awake  - CIWA monitoring  - Lorazepam (not on diazepam due to concerns of nodular  "liver) 2mg q2 PRN for CIWA >8  - Start Vitamin supplementation- Thiamine, Folic acid, Vit. B12, and Multivitamin qd  - seizure precautions  - fall precautions  - aspirations precautions  - addiction psych consulted; appreciate their assistance          DEVANTE (acute kidney injury)  Patient with acute kidney injury/acute renal failure likely due to acute tubular necrosis caused by traumatic rhabdo  DEVANTE is currently worsening. Baseline creatinine  0.9  - Labs reviewed- Renal function/electrolytes with Estimated Creatinine Clearance: 27 mL/min (A) (based on SCr of 2.3 mg/dL (H)). according to latest data. Monitor urine output and serial BMP and adjust therapy as needed. Avoid nephrotoxins and renally dose meds for GFR listed above.    Plan:   - daily CMP  - monitor UOP  - replenish lytes appropriately  - IVF for traumatic rhabdo  - hold losartan-HCTZ in the setting of DEVANTE     CLL (chronic lymphocytic leukemia)  Not on any treatment for CLL. Seen by Dr. Montano in the past. WBC elevated on admission.     Plan:   - daily CBC  - can consider heme-onc consult to potentially start therapy      Type 2 diabetes mellitus with hypoglycemia  Patient's FSGs are controlled on current medication regimen.  Last A1c reviewed-   Lab Results   Component Value Date    HGBA1C 5.5 11/04/2023     Most recent fingerstick glucose reviewed- No results for input(s): "POCTGLUCOSE" in the last 24 hours.  Current correctional scale  Low  Maintain anti-hyperglycemic dose as follows-   Antihyperglycemics (From admission, onward)      Start     Stop Route Frequency Ordered    02/10/24 1821  insulin aspart U-100 pen 0-5 Units         -- SubQ Every 6 hours PRN 02/10/24 1721          Hold Oral hypoglycemics while patient is in the hospital.    Plan:   - POCT glucose   - Diabetic diet   - BGL inpatient goal is 140-180    Hypothyroidism  - continue home synthroid      Malignant neoplasm of central portion of right breast in female, estrogen receptor " positive  Last clinic visit 6/30/22  Letrozole started in February 2021.  Transitioned to anastrozole but patient not taking.      Thrombocytopenia  Patient was found to have thrombocytopenia, the likely etiology is secondary to alcohol use d/o but CLL is possible etiology as well, will monitor the platelets Daily. Will transfuse if platelet count is <50k (if undergoing surgical procedure or have active bleeding). Hold DVT prophylaxis if platelets are <50k. The patient's platelet results have been reviewed and are listed below.  Recent Labs   Lab 02/10/24  1212   *         Hyperlipidemia  Chronic. Statin prescribed but patient not taking.       Sarcoidosis  Chronic. 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 Patient reports that the rash on her medical R ankle is similar to her previous erythema nodosum episode that required biopsy.     Plan:   - erythema nodosum is self-resolving  - if persistent erythema, will place referral for outpatient biopsy    Essential hypertension  Chronic, controlled. Latest blood pressure and vitals reviewed-     Temp:  [98.1 °F (36.7 °C)]   Pulse:  [116-132]   Resp:  [20-22]   BP: (109-140)/(56-67)   SpO2:  [95 %-96 %] .   Home meds for hypertension were reviewed and noted below.   Hypertension Medications               losartan-hydrochlorothiazide 100-25 mg (HYZAAR) 100-25 mg per tablet Take 1 tablet by mouth once daily.            While in the hospital, will manage blood pressure as follows; Adjust home antihypertensive regimen as follows- hold home meds in the setting of DEVANTE secondary to rhabdo    Will utilize p.r.n. blood pressure medication only if patient's blood pressure greater than 180/110 and she develops symptoms such as worsening chest pain or shortness of breath.      VTE Risk Mitigation (From admission, onward)           Ordered     heparin (porcine) injection 5,000 Units  Every 8 hours         02/10/24 1707     IP VTE HIGH RISK PATIENT  Once          02/10/24 1707     Place sequential compression device  Until discontinued         02/10/24 1707                                    Yris Maurer MD  Department of Hospital Medicine  [facility] - Emergency Dept

## 2024-02-11 NOTE — PLAN OF CARE
Problem: Adult Inpatient Plan of Care  Goal: Plan of Care Review  Outcome: Ongoing, Progressing  Flowsheets (Taken 2/11/2024 0345)  Plan of Care Reviewed With: patient  Goal: Patient-Specific Goal (Individualized)  Outcome: Ongoing, Progressing  Goal: Absence of Hospital-Acquired Illness or Injury  Outcome: Ongoing, Progressing  Intervention: Identify and Manage Fall Risk  Flowsheets (Taken 2/11/2024 0345)  Safety Promotion/Fall Prevention:   bed alarm set   assistive device/personal item within reach   lighting adjusted   medications reviewed   nonskid shoes/socks when out of bed   side rails raised x 3   instructed to call staff for mobility  Intervention: Prevent Skin Injury  Flowsheets (Taken 2/11/2024 0345)  Body Position: position changed independently  Skin Protection:   adhesive use limited   incontinence pads utilized  Intervention: Prevent and Manage VTE (Venous Thromboembolism) Risk  Flowsheets (Taken 2/11/2024 0345)  Activity Management: Rolling - L1  VTE Prevention/Management:   bleeding risk assessed   bleeding precations maintained  Range of Motion: active ROM (range of motion) encouraged  Intervention: Prevent Infection  Flowsheets (Taken 2/11/2024 0345)  Infection Prevention:   environmental surveillance performed   hand hygiene promoted   rest/sleep promoted   single patient room provided  Goal: Optimal Comfort and Wellbeing  Outcome: Ongoing, Progressing  Intervention: Monitor Pain and Promote Comfort  Flowsheets (Taken 2/11/2024 0345)  Pain Management Interventions:   care clustered   position adjusted  Intervention: Provide Person-Centered Care  Flowsheets (Taken 2/11/2024 0345)  Trust Relationship/Rapport:   care explained   choices provided   emotional support provided   empathic listening provided   questions answered   questions encouraged   reassurance provided   thoughts/feelings acknowledged     Patient awake, alert , and disoriented at times.  Pain assessed, pt denies having pain.  Pt  CIWA screen every 4 hours per order. Pt was having visual hallucinations, per order 2 mg Lorazepam was administered.  pt educated on safety, and medication use. No evidence of learning.  Plan of care discussed with patient, patient needs reinforcement.   Bed alarm on call light within reach.   Bed  low and locked.

## 2024-02-11 NOTE — DISCHARGE INSTRUCTIONS
REFERRAL RECOMMENDATIONS FOR ALCOHOL USE DISORDER      12 STEP PROGRAMS (and similar):     Alcoholics Anonymous (local)  [x] [phone]  [x] www.[place].org for schedules for in-person and online meetings  [x] There are AA meetings throughout the day all over town  [x] AA costs nothing to attend; they pass a basket for donations but this is not required    Alcoholics Anonymous Online Intergroup (national)  [x] www.aa-intergroup.org  [x] Good resource for large, nation-wide meetings  [x] Can also attend smaller, local meetings in other cities  [x] Countless meetings all day and all night  [x] AA costs nothing to attend; they pass a basket for donations but this is not required    Flying Sober - 24/7 zoom meetings for women and coed - sign on anytime, anywhere!  [email or web address]/    Online Intergroup of AA - 121 Open AA Boyd Meeting - 24/7 zoom meetings  https://aa-intergroup.org/meetings/    LOOKING FOR AN ALTERNATIVE TO 12 STEP PROGRAMS - check out:  [facility] Recovery: https://www.smartrecovery.org/about-us  [facility]: https://recoverydharma.org      DETOX UNITS (USUALLY 5-7 DAYS):     [facility]: [address], [place]  [phone], call first to ensure bed availability    [facility] [address] [facility] [address] [place]  [phone], Option 1, call first to ensure bed availability    [place] [facility]: [facility] [address], [place]  [phone] (intake by appointment only)    [facility]: [address], [place]  [phone]      INTENSIVE OUTPATIENT PROGRAMS:     [facility] RECOVERY [facility] (formerly known as the ABU)  [x] [phone], Option 2  [x] [address] 4th Floor, [place] [address]  [x] https://www.ochsner.org/services/[facility]-recovery-program  [x] [facility] delivers comprehensive and collaborative treatment for alcohol and substance use disorders.  Excellent program for working professionals or  anyone else seeking recovery.  [x] Requires insurance approval prior to starting program, call number above for more information.  [x] Intensive Outpatient Rehabilitation Program - M-F 9am-3pm - daily groups with psychologists and social workers, sessions with MDs 3x per week   [x] Ambulatory detox and dual diagnosis available    [facility] Intensive [facility]patient Program  [x] [phone]  [x] [address] [facility] (the clinic not on [facility] main [facility])  [x] Call number above for more info and to check insurance requirements    [facility]  [address] [place], [place] [address] [phone]    [facility]:  [address] [facility], [address]?, [place], [place] [address]?, [phone]  [address] [facility] [address]?, [place], [place] [address]?, [phone]    RESIDENTIAL REHABS (USUALLY 28 DAYS):     [facility]: [address], [phone]    [facility]: [address] , [facility]  [phone] (intake by appointment only)    [facility] (men only) [address] [facility], [phone]    [facility] (Female only) [address] [facility], [phone]    [facility]: [address], [facility], men's program [phone], women's program [phone]    [facility]: [address], [facility], [phone]    [facility]: [address] [place], [place], [phone]    [facility]: Men only, [phone], [address] [place] [facility]: [address], [place], [place], [phone]    [facility]: [facility] [place], [place],  [phone]  New Location: [address] [place] [address] [address], [place] [address], [phone]    [facility]:   ?[address]?[place], [place] [address]?[phone]    [place]: [address], [place] [address], [phone]    [place]: [place], [phone]     [facility]: [place], [place], [phone]    [address] [facility]: [address]ila  RADHA Oakes, [phone]    [facility]: [place], [place], [phone]    [place]: [place], [place], [phone]    [facility] [place]: [address], [place] [address], [phone]    COMMUNITY ADDICTION CLINICS:     [facility]: [address] [facility] [place], [phone] -or- [address] [place] [address]    [facility] [place]: [address], [place]  [address]     [facility]: Clinics [phone]; Crisis [phone]    [facility]: [address] [place] [address]    [facility]: [address] [place] [address]    [facility]: [address] [place] [address],    [place] [facility]: 2nd Floor [address] [place] [address]    [facility]: [address] [place] [address]    [facility], [address] [place] [address]    [facility]: [address] [place] [address], [place] [phone]  (serves youth 16-23 years old)    [facility] Center: [facility]/[place] [phone]    [facility]: [address] [facility], [place] [phone]    [facility]: [address], [place] [phone]    [facility]: [address] [facility], [address], [phone]     [facility]: [address] [facility], [phone], [phone]    RESOURCES IN OTHER [facility]:     [facility]: [address] [place], [phone], [phone]    [facility]: [address] [facility], [address], [phone]    [facility], [address] [place] [address], [phone]    [facility]: [address], [place], [phone]    [facility], [address] [facility], [phone] ([facility])    [facility], [address] [facility], [phone] ([facility])    [facility], [address], [place], [phone] ([place] [place], [facility])    [facility], [address], [place], [phone] ([facility])    [facility] Hotline [phone], [phone]    [facility], [address], [facility], [address] [facility], [address], [facility], [address], [facility], [address] [facility] [facility], [address], [place]    [facility], [address], [place] [phone]    [facility], [address] [facility] [phone]    [facility], [address] [facility], [phone]    [facility] for Addiction Recovery, [address] [place] [address], [phone]    MENTAL HEALTH/ADDICTIVE DISORDERS:     AA ([phone]), NA ([phone])   National Suicide Prevention Lifeline- Call 1-951.192.4575 Available 24 hours everyday  [facility] [phone]; Crisis Line [phone] - Call for options A-F:  Intensive Outpatient Treatment/ Day programs   [facility], please contact   [facility], please contact [phone] or [phone] to speak with an admissions counselor.  Behavioral Health Group (Methadone Maintenance)   [facility] [place] [address], [phone]  [address] [place] [address] [phone]  [facility], [address] [place] [address], [phone]   Addiction Treatment [facility] [phone]  [facility]  Recovery Center please contact [phone]  [facility], [address], 4th [place] [address] Phone: [phone]   [facility]cer  [place] Office: [address] [place] [address], [phone]  [place] Office: [address] [facility], [address], [place] [address], [phone]  [place] [facility]: [address] [place] [address] [phone]    Outpatient Substance Abuse Treatment   Behavioral Health Group (Methadone Maintenance)   [address] [place] [address], [phone]  [address], [place] [address] [phone]  [facility], [address] [place] [place] [address], [phone]  [facility]cer  [place] Office: [address] [place] [place] [address], [phone]  [place] Office: [address] [facility], [address], [place] [address], [phone]  [place] Office: [address] [place] [address] [phone]  [facility], [address] [place] [address] [phone]   [facility], [address] [facility], [address], [phone]  [facility] for Addiction Recover, [address] [place], [place] [phone]    Residential Substance Abuse Treatment   [facility] [address] [facility], [phone] or [phone]  [facility], [address] [facility], [phone]  [facility] (men only) [address] [place] [address], [phone]  [facility] (women only) [address] [place] [address] [phone]  [facility], [address] [place] [address] [phone]  [facility] (women only), intakes at [address] [facility], [phone]  [facility] (7-day program, $100, [address] [place], [phone], [phone], [phone])  [place] [facility] (Men only, [phone]), [address], [place] (Vets*/Non-Vets)  [facility] (Men only, $400/month program fee) [address] [facility] [address] Hussein, [phone]  [facility]  (Women over age 39 only), [address] [facility], [phone]    Out of Area:    [place], [address], [place], [place] ([phone])  [facility] (men only), [address] [facility] [place] [address], [phone]  [facility], [address] [place], [place] ([phone])  [place], [address] [place] [address] Dr. Flores, [place] ([phone])  [facility] Addiction [facility], [address] [facility], [phone]  [facility] (Women only, has to have mental illness, can be homeless or substance abuser), [phone]      IN CASE OF SUICIDAL THINKING, call the National Suicide Hotline Number: 988    988 Suicide & Crisis Lifeline: 988 , 9-673-344-TALK (7419)  Provides 24/7, free and confidential support for people in distress, prevention and crisis resources for you or your loved ones, and best practices for professionals.    Call, text or chat.  https://988Kedzohline.org

## 2024-02-11 NOTE — CARE UPDATE
Consult received for compartment syndrome ruleout left forearm.  Patient seen and evaluated at bedside.  Resting comfortably in bed with no apparent distress.  Superficial volar, dorsal, and mobile wad compartments all easily compressible.  Minimal pain with passive extension of the digits of the left hand.  Palpable radial pulse with cap refill <3s.  Full consult note to follow.

## 2024-02-11 NOTE — NURSING
Pt has blisteri present at the ulnar forearm of the LUE with swelling at the top of hand.  Pt states she can barely move arm due to pain.

## 2024-02-11 NOTE — ASSESSMENT & PLAN NOTE
Chronic. 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 Patient reports that the rash on her medical R ankle is similar to her previous erythema nodosum episode that required biopsy.     Plan:   - started pred 20mg daily for 7 days; unable to use first line therapy (NSAIDS) due to DEVANTE  - if persistent erythema, can consider rheum consult

## 2024-02-11 NOTE — ASSESSMENT & PLAN NOTE
Patient with acute kidney injury/acute renal failure likely due to acute tubular necrosis caused by traumatic rhabdo  DEVANTE is currently worsening. Baseline creatinine  0.9  - Labs reviewed- Renal function/electrolytes with Estimated Creatinine Clearance: 27 mL/min (A) (based on SCr of 2.3 mg/dL (H)). according to latest data. Monitor urine output and serial BMP and adjust therapy as needed. Avoid nephrotoxins and renally dose meds for GFR listed above.    Plan:   - daily CMP  - monitor UOP  - replenish lytes appropriately  - IVF for traumatic rhabdo  - hold losartan-HCTZ in the setting of DEVANTE

## 2024-02-11 NOTE — CONSULTS
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 responsiveness to treatment was assessed.  The patient demonstrates adequate ability/capacity to respond to treatment.    Inpatient consult to Psychiatry  Consult performed by: Bright Joseph MD  Consult ordered by: Yris Maurer MD

## 2024-02-11 NOTE — ASSESSMENT & PLAN NOTE
Patient with acute kidney injury/acute renal failure likely due to acute tubular necrosis caused by traumatic rhabdo  DEVANTE is currently worsening. Baseline creatinine  0.9  - Labs reviewed- Renal function/electrolytes with Estimated Creatinine Clearance: 31 mL/min (A) (based on SCr of 2 mg/dL (H)). according to latest data. Monitor urine output and serial BMP and adjust therapy as needed. Avoid nephrotoxins and renally dose meds for GFR listed above.    Plan:   - daily CMP  - monitor UOP  - strict I/O  - replenish lytes appropriately  - IVF for traumatic rhabdo  - hold losartan-HCTZ in the setting of DEVANTE

## 2024-02-11 NOTE — ASSESSMENT & PLAN NOTE
Encountered a fall 02/09.  found her on the floor, and called EMS. Presenting with muscle pain and weakness.  CT head w/o acute intracranial pathology. CK >25K. UA with 3+ blood, 8 RBC.  2L NS bolus given in the ED.     Plan:   - NS infusion  - trend CK  - daily CMP  - monitor UOP  - strict I/O  - replenish lytes appropriately  - Ortho consulted by ED for evaluation of L arm for c/o compartment syndrome. XR of L arm w/o acute fractures or dislocation; appreciate their recs  - ortho recommending MRI of L forearm, and sling  - IV dilaudid 0.5mg q6hr PRN for pain

## 2024-02-11 NOTE — NURSING
Per Med team under Dr Angi jimenez for patient to come off tele for MRI- patient off unit in MRI.

## 2024-02-11 NOTE — PLAN OF CARE
Problem: Violence Risk or Actual  Goal: Anger and Impulse Control  Outcome: Ongoing, Progressing     Problem: Adult Inpatient Plan of Care  Goal: Plan of Care Review  Outcome: Ongoing, Progressing  Goal: Patient-Specific Goal (Individualized)  Outcome: Ongoing, Progressing  Goal: Absence of Hospital-Acquired Illness or Injury  Outcome: Ongoing, Progressing  Goal: Optimal Comfort and Wellbeing  Outcome: Ongoing, Progressing  Goal: Readiness for Transition of Care  Outcome: Ongoing, Progressing     Problem: Diabetes Comorbidity  Goal: Blood Glucose Level Within Targeted Range  Outcome: Ongoing, Progressing     Problem: Fluid and Electrolyte Imbalance (Acute Kidney Injury/Impairment)  Goal: Fluid and Electrolyte Balance  Outcome: Ongoing, Progressing     Problem: Oral Intake Inadequate (Acute Kidney Injury/Impairment)  Goal: Optimal Nutrition Intake  Outcome: Ongoing, Progressing     Problem: Renal Function Impairment (Acute Kidney Injury/Impairment)  Goal: Effective Renal Function  Outcome: Ongoing, Progressing     Problem: Impaired Wound Healing  Goal: Optimal Wound Healing  Outcome: Ongoing, Progressing     Problem: Fall Injury Risk  Goal: Absence of Fall and Fall-Related Injury  Outcome: Ongoing, Progressing     Problem: Skin Injury Risk Increased  Goal: Skin Health and Integrity  Outcome: Ongoing, Progressing

## 2024-02-11 NOTE — ASSESSMENT & PLAN NOTE
Earl Abdul is a 63 y.o. female w/PMH of alcohol use disorder (with significant withdrawal history), T2DM, COPD, GERD, fibromyalgia, sarcoidosis, breast Ca, CLL (no current treatment), HTN, HLD, hypothyroidism, who presents with left forearm pain with associated left hand numbness/weakness.  Patient presented after passing out for approximately 12 hours on her left side secondary to alcohol intoxication.  Labs on arrival significant [place] of 524[address], WBC 15.59, ESR 10, CRP 75.9.  On physical exam, patient has decreased sensation globally at the left hand, weakness with extension of the digits (most notably at the thumb/ring/small finger), and weakness with abduction/adduction of the digits of the left hand.  Compartments soft and easily compressible.  She also has painful range of motion of the left elbow with an associated effusion.  Suspect her current presentation may be secondary to delayed presentation compartment syndrome versus pressure-related injury of the posterior interosseous and ulnar nerves.      No acute orthopedic intervention is warranted at this time.  MRI of the left elbow ordered to evaluate for possible occult fracture.  Recommend sling for comfort support, multimodal pain regimen, and daily PT/OT.  Further recommendations will be made pending MRI results.

## 2024-02-11 NOTE — ASSESSMENT & PLAN NOTE
Chronic, controlled. Latest blood pressure and vitals reviewed-     Temp:  [98.1 °F (36.7 °C)]   Pulse:  [116-132]   Resp:  [20-22]   BP: (109-140)/(56-67)   SpO2:  [95 %-96 %] .   Home meds for hypertension were reviewed and noted below.   Hypertension Medications               losartan-hydrochlorothiazide 100-25 mg (HYZAAR) 100-25 mg per tablet Take 1 tablet by mouth once daily.            While in the hospital, will manage blood pressure as follows; Adjust home antihypertensive regimen as follows- hold home meds in the setting of DEVANTE secondary to rhabdo    Will utilize p.r.n. blood pressure medication only if patient's blood pressure greater than 180/110 and she develops symptoms such as worsening chest pain or shortness of breath.

## 2024-02-11 NOTE — ASSESSMENT & PLAN NOTE
Alcohol use disorder, severe, dependence    Treatment with 1 mg Ativan followed by phenobarbital initiated in ED.    Plan:   - CT abdomen 02/10 with signs of nodular liver; pending US of liver with doppler for further eval --> US findings consistent with cirrhosis morphology.  Alcohol Withdrawal precautions:  - Vitals q4h while awake  - CIWA monitoring  - Lorazepam (not on diazepam due to concerns of nodular liver) 2mg PO q8  - Lorazepam 2mg IV q2hr PRN for CIWA>8  - Start Vitamin supplementation- Thiamine, Folic acid, Vit. B12, and Multivitamin qd  - seizure precautions  - fall precautions  - aspirations precautions  - addiction psych consulted; appreciate their assistance

## 2024-02-11 NOTE — ASSESSMENT & PLAN NOTE
Chronic, controlled. Latest blood pressure and vitals reviewed-     Temp:  [97.9 °F (36.6 °C)-98.7 °F (37.1 °C)]   Pulse:  [104-116]   Resp:  [20-23]   BP: (109-189)/(56-93)   SpO2:  [95 %-100 %] .   Home meds for hypertension were reviewed and noted below.   Hypertension Medications               losartan-hydrochlorothiazide 100-25 mg (HYZAAR) 100-25 mg per tablet Take 1 tablet by mouth once daily.            While in the hospital, will manage blood pressure as follows; Adjust home antihypertensive regimen as follows- hold home meds in the setting of DEVANTE secondary to rhabdo    Will utilize p.r.n. blood pressure medication only if patient's blood pressure greater than 180/110 and she develops symptoms such as worsening chest pain or shortness of breath.    Plan:   - given clonidine 0.1mg once to control BP  - holding off on starting home losartan-HCTZ given her DEVANTE

## 2024-02-12 LAB
ALBUMIN SERPL BCP-MCNC: 2.9 G/DL (ref 3.5–5.2)
ALP SERPL-CCNC: 42 U/L (ref 55–135)
ALT SERPL W/O P-5'-P-CCNC: 146 U/L (ref 10–44)
ANION GAP SERPL CALC-SCNC: 5 MMOL/L (ref 8–16)
APPEARANCE FLD: NORMAL
AST SERPL-CCNC: 312 U/L (ref 10–40)
BASOPHILS # BLD AUTO: 0.01 K/UL (ref 0–0.2)
BASOPHILS NFR BLD: 0.1 % (ref 0–1.9)
BILIRUB SERPL-MCNC: 0.4 MG/DL (ref 0.1–1)
BODY FLD TYPE: NORMAL
BODY FLD TYPE: NORMAL
BODY FLUID COMMENTS: NORMAL
BUN SERPL-MCNC: 10 MG/DL (ref 8–23)
CALCIUM SERPL-MCNC: 7.4 MG/DL (ref 8.7–10.5)
CHLORIDE SERPL-SCNC: 106 MMOL/L (ref 95–110)
CK SERPL-CCNC: ABNORMAL U/L (ref 20–180)
CO2 SERPL-SCNC: 23 MMOL/L (ref 23–29)
COLOR FLD: NORMAL
CREAT SERPL-MCNC: 1.3 MG/DL (ref 0.5–1.4)
CRP SERPL-MCNC: 80.5 MG/L (ref 0–8.2)
CRYSTALS FLD MICRO: NEGATIVE
DIFFERENTIAL METHOD BLD: ABNORMAL
EOSINOPHIL # BLD AUTO: 0 K/UL (ref 0–0.5)
EOSINOPHIL NFR BLD: 0.4 % (ref 0–8)
ERYTHROCYTE [DISTWIDTH] IN BLOOD BY AUTOMATED COUNT: 17.1 % (ref 11.5–14.5)
ERYTHROCYTE [SEDIMENTATION RATE] IN BLOOD BY PHOTOMETRIC METHOD: 24 MM/HR (ref 0–36)
EST. GFR  (NO RACE VARIABLE): 45.6 ML/MIN/1.73 M^2
GLUCOSE SERPL-MCNC: 99 MG/DL (ref 70–110)
GRAM STN SPEC: NORMAL
GRAM STN SPEC: NORMAL
HCT VFR BLD AUTO: 27.5 % (ref 37–48.5)
HGB BLD-MCNC: 8.2 G/DL (ref 12–16)
IMM GRANULOCYTES # BLD AUTO: 0.04 K/UL (ref 0–0.04)
IMM GRANULOCYTES NFR BLD AUTO: 0.5 % (ref 0–0.5)
LYMPHOCYTES # BLD AUTO: 4.6 K/UL (ref 1–4.8)
LYMPHOCYTES NFR BLD: 56.2 % (ref 18–48)
LYMPHOCYTES NFR FLD MANUAL: 2 %
MAGNESIUM SERPL-MCNC: 1.4 MG/DL (ref 1.6–2.6)
MCH RBC QN AUTO: 25.5 PG (ref 27–31)
MCHC RBC AUTO-ENTMCNC: 29.8 G/DL (ref 32–36)
MCV RBC AUTO: 86 FL (ref 82–98)
MONOCYTES # BLD AUTO: 0.6 K/UL (ref 0.3–1)
MONOCYTES NFR BLD: 6.9 % (ref 4–15)
MONOS+MACROS NFR FLD MANUAL: 13 %
NEUTROPHILS # BLD AUTO: 2.9 K/UL (ref 1.8–7.7)
NEUTROPHILS NFR BLD: 35.9 % (ref 38–73)
NEUTROPHILS NFR FLD MANUAL: 85 %
NRBC BLD-RTO: 0 /100 WBC
PATH INTERP FLD-IMP: NORMAL
PHOSPHATE SERPL-MCNC: 2.7 MG/DL (ref 2.7–4.5)
PLATELET # BLD AUTO: 90 K/UL (ref 150–450)
PMV BLD AUTO: 11.3 FL (ref 9.2–12.9)
POCT GLUCOSE: 128 MG/DL (ref 70–110)
POCT GLUCOSE: 129 MG/DL (ref 70–110)
POCT GLUCOSE: 135 MG/DL (ref 70–110)
POCT GLUCOSE: 157 MG/DL (ref 70–110)
POTASSIUM SERPL-SCNC: 3.4 MMOL/L (ref 3.5–5.1)
PROT SERPL-MCNC: 5.5 G/DL (ref 6–8.4)
RBC # BLD AUTO: 3.21 M/UL (ref 4–5.4)
SODIUM SERPL-SCNC: 134 MMOL/L (ref 136–145)
WBC # BLD AUTO: 8.12 K/UL (ref 3.9–12.7)
WBC # FLD: 1625 /CU MM

## 2024-02-12 PROCEDURE — 25000003 PHARM REV CODE 250

## 2024-02-12 PROCEDURE — 86140 C-REACTIVE PROTEIN: CPT

## 2024-02-12 PROCEDURE — 83735 ASSAY OF MAGNESIUM: CPT

## 2024-02-12 PROCEDURE — 97530 THERAPEUTIC ACTIVITIES: CPT

## 2024-02-12 PROCEDURE — 63600175 PHARM REV CODE 636 W HCPCS

## 2024-02-12 PROCEDURE — 36410 VNPNXR 3YR/> PHY/QHP DX/THER: CPT

## 2024-02-12 PROCEDURE — 87102 FUNGUS ISOLATION CULTURE: CPT

## 2024-02-12 PROCEDURE — 0R9M3ZX DRAINAGE OF LEFT ELBOW JOINT, PERCUTANEOUS APPROACH, DIAGNOSTIC: ICD-10-PCS | Performed by: ORTHOPAEDIC SURGERY

## 2024-02-12 PROCEDURE — 76937 US GUIDE VASCULAR ACCESS: CPT

## 2024-02-12 PROCEDURE — 87070 CULTURE OTHR SPECIMN AEROBIC: CPT

## 2024-02-12 PROCEDURE — 87205 SMEAR GRAM STAIN: CPT

## 2024-02-12 PROCEDURE — 85652 RBC SED RATE AUTOMATED: CPT

## 2024-02-12 PROCEDURE — A4216 STERILE WATER/SALINE, 10 ML: HCPCS | Performed by: STUDENT IN AN ORGANIZED HEALTH CARE EDUCATION/TRAINING PROGRAM

## 2024-02-12 PROCEDURE — 99232 SBSQ HOSP IP/OBS MODERATE 35: CPT | Mod: ,,, | Performed by: PSYCHIATRY & NEUROLOGY

## 2024-02-12 PROCEDURE — 25000003 PHARM REV CODE 250: Performed by: STUDENT IN AN ORGANIZED HEALTH CARE EDUCATION/TRAINING PROGRAM

## 2024-02-12 PROCEDURE — 84100 ASSAY OF PHOSPHORUS: CPT

## 2024-02-12 PROCEDURE — 85025 COMPLETE CBC W/AUTO DIFF WBC: CPT

## 2024-02-12 PROCEDURE — 97165 OT EVAL LOW COMPLEX 30 MIN: CPT

## 2024-02-12 PROCEDURE — 89051 BODY FLUID CELL COUNT: CPT

## 2024-02-12 PROCEDURE — 87206 SMEAR FLUORESCENT/ACID STAI: CPT

## 2024-02-12 PROCEDURE — 87075 CULTR BACTERIA EXCEPT BLOOD: CPT

## 2024-02-12 PROCEDURE — 89060 EXAM SYNOVIAL FLUID CRYSTALS: CPT

## 2024-02-12 PROCEDURE — 80053 COMPREHEN METABOLIC PANEL: CPT

## 2024-02-12 PROCEDURE — 82550 ASSAY OF CK (CPK): CPT

## 2024-02-12 PROCEDURE — 87116 MYCOBACTERIA CULTURE: CPT

## 2024-02-12 PROCEDURE — C1751 CATH, INF, PER/CENT/MIDLINE: HCPCS

## 2024-02-12 PROCEDURE — 20600001 HC STEP DOWN PRIVATE ROOM

## 2024-02-12 RX ORDER — MAGNESIUM SULFATE HEPTAHYDRATE 40 MG/ML
2 INJECTION, SOLUTION INTRAVENOUS ONCE
Status: COMPLETED | OUTPATIENT
Start: 2024-02-12 | End: 2024-02-12

## 2024-02-12 RX ORDER — HYDROMORPHONE HYDROCHLORIDE 1 MG/ML
0.5 INJECTION, SOLUTION INTRAMUSCULAR; INTRAVENOUS; SUBCUTANEOUS ONCE
Status: DISCONTINUED | OUTPATIENT
Start: 2024-02-12 | End: 2024-02-13

## 2024-02-12 RX ORDER — CALCIUM GLUCONATE 20 MG/ML
1 INJECTION, SOLUTION INTRAVENOUS ONCE
Status: COMPLETED | OUTPATIENT
Start: 2024-02-12 | End: 2024-02-12

## 2024-02-12 RX ORDER — SODIUM CHLORIDE 0.9 % (FLUSH) 0.9 %
10 SYRINGE (ML) INJECTION EVERY 6 HOURS
Status: DISCONTINUED | OUTPATIENT
Start: 2024-02-12 | End: 2024-02-14 | Stop reason: HOSPADM

## 2024-02-12 RX ORDER — GABAPENTIN 300 MG/1
300 CAPSULE ORAL 3 TIMES DAILY
Status: DISCONTINUED | OUTPATIENT
Start: 2024-02-12 | End: 2024-02-14 | Stop reason: HOSPADM

## 2024-02-12 RX ORDER — SODIUM CHLORIDE 0.9 % (FLUSH) 0.9 %
10 SYRINGE (ML) INJECTION
Status: DISCONTINUED | OUTPATIENT
Start: 2024-02-12 | End: 2024-02-14 | Stop reason: HOSPADM

## 2024-02-12 RX ORDER — TRAZODONE HYDROCHLORIDE 100 MG/1
200 TABLET ORAL NIGHTLY
Status: DISCONTINUED | OUTPATIENT
Start: 2024-02-12 | End: 2024-02-14 | Stop reason: HOSPADM

## 2024-02-12 RX ADMIN — HYDROMORPHONE HYDROCHLORIDE 0.5 MG: 1 INJECTION, SOLUTION INTRAMUSCULAR; INTRAVENOUS; SUBCUTANEOUS at 10:02

## 2024-02-12 RX ADMIN — SODIUM BICARBONATE: 84 INJECTION, SOLUTION INTRAVENOUS at 09:02

## 2024-02-12 RX ADMIN — Medication 10 ML: at 12:02

## 2024-02-12 RX ADMIN — GABAPENTIN 300 MG: 300 CAPSULE ORAL at 08:02

## 2024-02-12 RX ADMIN — Medication 10 ML: at 06:02

## 2024-02-12 RX ADMIN — GABAPENTIN 300 MG: 300 CAPSULE ORAL at 03:02

## 2024-02-12 RX ADMIN — LEVOTHYROXINE SODIUM 75 MCG: 75 TABLET ORAL at 05:02

## 2024-02-12 RX ADMIN — HEPARIN SODIUM 5000 UNITS: 5000 INJECTION INTRAVENOUS; SUBCUTANEOUS at 03:02

## 2024-02-12 RX ADMIN — TRAZODONE HYDROCHLORIDE 200 MG: 100 TABLET ORAL at 08:02

## 2024-02-12 RX ADMIN — HYDROMORPHONE HYDROCHLORIDE 0.5 MG: 1 INJECTION, SOLUTION INTRAMUSCULAR; INTRAVENOUS; SUBCUTANEOUS at 06:02

## 2024-02-12 RX ADMIN — CALCIUM GLUCONATE 1 G: 20 INJECTION, SOLUTION INTRAVENOUS at 11:02

## 2024-02-12 RX ADMIN — HEPARIN SODIUM 5000 UNITS: 5000 INJECTION INTRAVENOUS; SUBCUTANEOUS at 05:02

## 2024-02-12 RX ADMIN — CEFTRIAXONE 1 G: 1 INJECTION, POWDER, FOR SOLUTION INTRAMUSCULAR; INTRAVENOUS at 06:02

## 2024-02-12 RX ADMIN — LORAZEPAM 2 MG: 1 TABLET ORAL at 05:02

## 2024-02-12 RX ADMIN — LORAZEPAM 2 MG: 1 TABLET ORAL at 10:02

## 2024-02-12 RX ADMIN — HYDROMORPHONE HYDROCHLORIDE 0.5 MG: 1 INJECTION, SOLUTION INTRAMUSCULAR; INTRAVENOUS; SUBCUTANEOUS at 01:02

## 2024-02-12 RX ADMIN — HEPARIN SODIUM 5000 UNITS: 5000 INJECTION INTRAVENOUS; SUBCUTANEOUS at 10:02

## 2024-02-12 RX ADMIN — SODIUM CHLORIDE: 9 INJECTION, SOLUTION INTRAVENOUS at 02:02

## 2024-02-12 RX ADMIN — Medication 10 ML: at 11:02

## 2024-02-12 RX ADMIN — MAGNESIUM SULFATE HEPTAHYDRATE 2 G: 40 INJECTION, SOLUTION INTRAVENOUS at 11:02

## 2024-02-12 RX ADMIN — LORAZEPAM 2 MG: 2 INJECTION INTRAMUSCULAR; INTRAVENOUS at 08:02

## 2024-02-12 RX ADMIN — LORAZEPAM 2 MG: 1 TABLET ORAL at 03:02

## 2024-02-12 NOTE — SUBJECTIVE & OBJECTIVE
Interval History: NAEO. VSS. CK halved. Ortho tapped left elbow.    Review of Systems   Unable to perform ROS: Acuity of condition     Objective:     Vital Signs (Most Recent):  Temp: 98.9 °F (37.2 °C) (02/12/24 1134)  Pulse: 97 (02/12/24 1134)  Resp: 19 (02/12/24 1134)  BP: (!) 171/77 (02/12/24 1134)  SpO2: (!) 94 % (02/12/24 1134) Vital Signs (24h Range):  Temp:  [97.6 °F (36.4 °C)-98.9 °F (37.2 °C)] 98.9 °F (37.2 °C)  Pulse:  [] 97  Resp:  [14-20] 19  SpO2:  [94 %-97 %] 94 %  BP: (129-176)/(58-93) 171/77     Weight: 86.2 kg (190 lb)  Body mass index is 30.67 kg/m².    Intake/Output Summary (Last 24 hours) at 2/12/2024 1315  Last data filed at 2/12/2024 0516  Gross per 24 hour   Intake 3611.17 ml   Output --   Net 3611.17 ml         Physical Exam  Vitals and nursing note reviewed.   Constitutional:       General: She is not in acute distress.     Appearance: She is obese. She is ill-appearing.   HENT:      Head: Normocephalic and atraumatic.      Nose:      Comments: NC  Eyes:      General: No scleral icterus.     Extraocular Movements: Extraocular movements intact.      Conjunctiva/sclera: Conjunctivae normal.   Cardiovascular:      Rate and Rhythm: Normal rate and regular rhythm.      Pulses: Normal pulses.      Heart sounds: Normal heart sounds.   Pulmonary:      Comments: Coarse breathing  Abdominal:      General: Bowel sounds are normal. There is no distension.      Palpations: Abdomen is soft.      Tenderness: There is no abdominal tenderness. There is no guarding.   Musculoskeletal:         General: Normal range of motion.      Right lower leg: No edema.      Left lower leg: No edema.      Comments: Left lower arm bandaged. Band aid at site of left elbow aspiration.   Neurological:      General: No focal deficit present.      Mental Status: She is alert. Mental status is at baseline.   Psychiatric:         Mood and Affect: Mood normal.         Behavior: Behavior normal.             Significant Labs:  All pertinent labs within the past 24 hours have been reviewed.  CBC:   Recent Labs   Lab 02/11/24  0539 02/12/24  0659   WBC 7.80 8.12   HGB 9.3* 8.2*   HCT 31.1* 27.5*   PLT 84* 90*     CMP:   Recent Labs   Lab 02/11/24  0539 02/12/24  0659    134*   K 3.8 3.4*    106   CO2 19* 23   * 99   BUN 19 10   CREATININE 2.0* 1.3   CALCIUM 8.0* 7.4*   PROT 5.8* 5.5*   ALBUMIN 3.2* 2.9*   BILITOT 0.8 0.4   ALKPHOS 49* 42*   * 312*   * 146*   ANIONGAP 10 5*       Significant Imaging: I have reviewed all pertinent imaging results/findings within the past 24 hours.

## 2024-02-12 NOTE — PROGRESS NOTES
"274}        FOLLOW UP VISIT: ADDICTION PSYCHIATRY CONSULTATION SERVICE      ASSESSMENT AND PLAN:     DIAGNOSES & PROBLEMS:  Alcohol use disorder, severe, dependence     In Summary:  Patient is a 62 y/o F with past psych history of alcohol use disorder, severe, dependence, major depressive disorder, generalized anxiety disorder, and PMH of breast cancer, fibromyalgia, sarcoidosis, hypothyroidism, T2DM, and CLL who is admitted after recurrent falls secondary to alcohol use. Patient denies interest in residential rehab despite recommendation.  Does report interest in attending IOP upon discharge.      Plan:  - Continue Ativan 2 mg q8 hours for alcohol withdrawal (may require increase in frequency pending clinical response)   - add patient's home trazodone for sleep, 200 mg nightly   - patient requested a Psychology consult while inpatient; could benefit from services  - recommend patient enroll in addiction residential rehab program after discharge and medical stabilization, will continue to discuss residential rehab versus IOP  -Will provide resources for patient and continue discuss rehabilitation options regarding post discharge disposition as well as outpatient psychiatric treatment  - counseled on full abstinence from alcohol and substances of abuse (illicit and prescription)  - full engagement in 12 step (or equivalent) recovery program(s), including meeting attendance and acquisition/maintenance of sponsor      INTERVAL HISTORY AND ROS:     Patient with NAEON. She was adherent to all scheduled medications and did not require any behavioral PRNs. Patient laying in bed comfortably. Says she is in the hospital because "she is an alcoholic, she drinks too much, and se has severe depression." Says she slept poorly last night which she attributes to not being prescribed her home Trazodone. Patient becomes tearful when recollecting stressors in life, including the death of her son, and a cancer diagnosis with " "subsequent double mastectomy. She denies SI. When asked about hallucinations, she states "yes, I thought I was eating a hamburger and fries but the nurse told me I wasn't." She then explains that she "hasn't seen people or anything like that" and also denies AH. She does report some confusion. She reports interest in attending IOP upon discharge.     CURRENT PSYCHOTROPIC REGIMEN:        PERTINENT PAST HISTORY AND CHART REVIEW:     See consult note      EXAMINATION:     BP (!) 171/77 (Patient Position: Lying)   Pulse 97   Temp 98.9 °F (37.2 °C) (Oral)   Resp 19   Ht 5' 6" (1.676 m)   Wt 86.2 kg (190 lb)   SpO2 (!) 94%   BMI 30.67 kg/m²     MENTAL STATUS EXAMINATION:  General Appearance & Behavior:  adequately groomed, appropriately dressed, in no apparent distress, under good behavioral control  Involuntary Movements and Motor Activity:  no abnormal involuntary movements noted, no psychomotor agitation or retardation  Speech & Language:  conversational, spontaneous, speaks and understands English proficiently, some slurred speech  Mood: "ok"  Affect:  anxious  Thought Process & Associations:   mostly linear, goal oriented, though does appear confused at times  Thought Content & Perceptions:  no suicidal or homicidal ideation, no auditory hallucinations, no visual hallucinations, not responding to internal stimuli, no paranoid ideation, no persecutory delusions, no ideas of reference, no thought broadcasting, no thought insertion or withdrawal, no delusions  Sensorium and Cognition:  grossly intact, no significant deficits noted  Insight & Judgment:  insight and judgment adequate      RISK MANAGEMENT:     I[]I Y  I[x]I N  I[]I U  I[]I A  Suicidal Ideation/Behavior: **   I[]I Y  I[x]I N  I[]I U  I[]I A  Homicidal Ideation/Behavior: **  I[]I Y  I[x]I N  I[]I U  I[]I A  Violence: **  I[]I Y  I[x]I N  I[]I U  I[]I A  Self-Injurious Behavior: **    The patient is deemed to be a well-meaning but unreliable and " factually inaccurate historian.    I[]I Y  I[x]I N  I[]I U  I[]I A  I[]I N/A  Minimization of Risk Parameters Suspected/Evident: **  I[]I Y  I[x]I N  I[]I U  I[]I A  I[]I N/A  Exaggeration of Risk Parameters Suspected/Evident: **      [] Y  [x] N  Danger to Self:   [] Y  [x] N  Danger to Others:   [] Y  [x] N  Grave Disability:       In cases of emergency, daily coverage provided by Acute/ER Psych MD, NP, PA, or SW, with contact numbers located in [facility] On Call Schedule.    Alejandro Gibbs  [facility] PGY2  Department of Psychiatry  [facility]        KEY:     I[]I Y = Yes / Present / Endorses  I[]I N = No / Absent / Denies  I[]I U = Unknown / Unable to Assess / Unwilling to Participate  I[]I A = Ambiguity Exists / Accuracy Uncertain  I[]I D = Denial or Minimization is Suspected/Evident  I[]I N/A = Non-Applicable    CHART REVIEW:     Available documentation has been reviewed, and pertinent elements of the chart - including previous psychiatric evaluations - have been incorporated into this evaluation where appropriate.    ADVICE AND COUNSELING:     [x] In cases of emergencies (e.g. SI/HI resulting in danger to self or others, functioning deteriorates to the level of grave disability), call 911 or [phone], or present to the emergency department for immediate assistance.  [x] Patient should not operate a motor vehicle or heavy machinery if effects of medications or underlying symptoms/condition impair the ability to safely do so.    Alcohol, Tobacco, and Drug Counseling, as well as resources, has been provided, as warranted.     Shared medical decision making and informed consent are the hallmark and bedrock of good clinical care, and as such have been employed and obtained, respectively, to the degree possible.      Risk Mitigation Strategies, Harm Reduction Techniques, and Safety Netting are important interventions that can reduce acute and chronic risk, and as such have been  employed to the degree possible.    Prescription Drug Management entails the review, recommendation, or consideration without recommendation of medications, and as such was employed during the encounter.    Additional Psychoeducation has been provided, as warranted.    Discussed, to the extent possible, diagnosis, risks and benefits of proposed treatment vs alternative treatments vs no treatment, potential side effects of these treatments and the inherent unpredictability of treatment. The patient expresses understanding of the above and displays the capacity to agree with this treatment given said understanding. Patient also agrees that, currently, the benefits outweigh the risks and consents to treatment at this time.     Written material has been provided to supplement, augment, and reinforce any discussions and interventions, via the AVS or other pre-printed handouts, as warranted.      DIAGNOSTIC TESTING:     The chart was reviewed for recent diagnostic procedures and investigations, and pertinent results are noted below.    Na 134 (L)  2/12/2024   K 3.4 (L)  2/12/2024   Ca 7.4 (L)  2/12/2024  Phos 2.7  2/12/2024   Mg 1.4 (L)  2/12/2024     Glu 99  2/12/2024   HgA1c 6.0 (H)  2/11/2024    BUN 10  2/12/2024   Cr 1.3  2/12/2024   GFR 45.6 (A)  2/12/2024   Specific Gravity 1.015  2/10/2024   Protein (Urine) 2+ (A)  2/10/2024   Microalbumin *   *     T Prot 5.5 (L)  2/12/2024   Alb 2.9 (L)  2/12/2024   T Bili 0.4  2/12/2024   Alk Phos 42 (L)  2/12/2024    (H)  2/12/2024    (H)  2/12/2024   GGT *   *   NH3 29  4/6/2023   Amylase *   *   Lipase 10  2/10/2024    TSH 0.752  11/23/2023   Free T4 1.17  11/4/2023  PTH *   *  Prolactin *   *   CPK 15042 (H)  2/12/2024   Troponin I 0.012  2/2/2024   PT 10.7  9/17/2023   INR 1.0  9/17/2023    WBC 8.12  2/12/2024   RBC 3.21 (L)  2/12/2024   Hgb 8.2 (L)  2/12/2024   HCT 27.5 (L)  2/12/2024   MCV 86  2/12/2024  PLT 90 (L)   2/12/2024   ANC 2.9; 35.9;  (L)  2/12/2024    Cholesterol 184  4/6/2023   Triglycerides 161 (H)  4/6/2023   .8  4/6/2023   HDL 44  4/6/2023     B12 368  6/6/2023   Folate 13.3  4/6/2023   Thiamine 136 (H)  *   Vit D *   *     HIV 1/2 Ag/Ab Non-reactive  3/10/2023   Hep B Surface *   *   Hep B Core *   *   Hep A *   *   Hep C Non-reactive  3/10/2023   RPR *   *     Lithium *   *   VPA *   *   Clozapine *   *     Alcohol 18 (A)  2/10/2024   Benzodiazepines Presumptive Positive (A)  2/10/2024   Barbiturates Presumptive Positive (A)  2/10/2024   Cannabis Negative  2/10/2024   Cocaine Negative  2/10/2024   Amphetamines Negative  2/10/2024   PCP Negative  2/10/2024   Opiates Negative  2/10/2024   Methadone Negative  2/10/2024   Buprenorphine *   *   Fentanyl Not Detected  7/18/2023   Oxycodone Presumptive Positive (A)  7/18/2023   Tramadol *   *     Ethanol <10  2/10/2024  PETH 301  11/13/2023   EtG Negative  11/13/2023   EtS *   *   Buprenorphine *   *   Norbuprenorphine *   *

## 2024-02-12 NOTE — PLAN OF CARE
[facility] - Telemetry Stepdown  Initial Discharge Assessment       Primary Care Provider: Andrew Rodriguez MD    Admission Diagnosis: Pain [R52]  Chest pain [R07.9]  Contusion of left forearm, initial encounter [S50.12XA]  Alcohol withdrawal syndrome without complication [F10.930]  Withdrawal complaint [R68.89]  Traumatic rhabdomyolysis, initial encounter [T79.6XXA]    Admission Date: 2/10/2024  Expected Discharge Date:          Payor: [employer] / Plan: [facility] CHOICE PLUS / Product Type: Commercial /     Extended Emergency Contact Information  Primary Emergency Contact: Henrique Abdul  Address: [address] [place] [address] [address] [place] [address] [facility]  Home Phone: [phone]  Relation: Spouse  Secondary Emergency Contact: Carlos Suazo  Mobile Phone: [phone]  Relation: Son    Discharge Plan A: Home, Home with family         [id number] - [place] - [address] AT [facility]  [address]  [place]   [place] [address]  Phone: [phone] Fax: [phone]      Initial Assessment (most recent)       Adult Discharge Assessment - 02/12/24 1422          Discharge Assessment    Assessment Type Discharge Planning Assessment     Confirmed/corrected address, phone number and insurance Yes     Confirmed Demographics Correct on Facesheet     Source of Information patient;family     Does patient/caregiver understand observation status Yes     Communicated BECCA with patient/caregiver Date not available/Unable to determine     Reason For Admission Rhabdomyolysis     People in Home spouse     Do you expect to return to your current living situation? Yes     Do you have help at home or someone to help you manage your care at home? Yes     Who are your caregiver(s) and their phone number(s)? () Henrique [phone]     Prior to hospitilization cognitive status: Alert/Oriented     Current cognitive status: Alert/Oriented   During assessment pt fell  asleep    Walking or Climbing Stairs Difficulty no     Dressing/Bathing Difficulty no     Home Layout Bedroom on 2nd floor   Per pt and wife there's 3 sets of stairs throughout their home (3-15 stairs)    Equipment Currently Used at Home cane, straight;oxygen     Patient currently being followed by outpatient case management? No     Do you currently have service(s) that help you manage your care at home? No     Do you take prescription medications? Yes     Do you have prescription coverage? Yes     Coverage [facility] CHOICE PLUS     Do you have any problems affording any of your prescribed medications? No     Is the patient taking medications as prescribed? yes     Who is going to help you get home at discharge? Bardy     Are you on dialysis? No     Do you take coumadin? No     Discharge Plan A Home;Home with family     DME Needed Upon Discharge  wheelchair   TBD    Discharge Plan discussed with: Spouse/sig other     Name(s) and Number(s) Vanita Duenas [phone]        Physical Activity    On average, how many days per week do you engage in moderate to strenuous exercise (like a brisk walk)? 7 days     On average, how many minutes do you engage in exercise at this level? 40 min        Housing Stability    In the last 12 months, was there a time when you were not able to pay the mortgage or rent on time? No     In the last 12 months, was there a time when you did not have a steady place to sleep or slept in a shelter (including now)? No        Transportation Needs    In the past 12 months, has lack of transportation kept you from medical appointments or from getting medications? No     In the past 12 months, has lack of transportation kept you from meetings, work, or from getting things needed for daily living? No        Social Connections    In a typical week, how many times do you talk on the phone with family, friends, or neighbors? More than three times a week     How often do you get  "together with friends or relatives? Once a week        Alcohol Use    Q1: How often do you have a drink containing alcohol? 4 or more times a week     Q2: How many drinks containing alcohol do you have on a typical day when you are drinking? 5 or 6   Per pt "a fifth of vodka"    Q3: How often do you have six or more drinks on one occasion? --   Varies per pt                   SW met with patient and pt -Henrique in room to complete DPA. Questions answered / contact numbers provided.  Use PREFERRED PHARMACY / BEDSIDE DELIVERY for any necessary medications at time of discharge. Pt is independent with all ADLs , does have a  DME, In-home equipment (standard cane), is not on HD, BTs but per pt on home oxygen. Pt  will be assisting with help upon discharge. Pt  also will be providing transportation home. Pt disclosed pt last inpatient rehab for substance use was 2weeks ago at [facility] in [place], [place] which pt spent 60days. Hospital follow up will be scheduled with PCP which is Dr. Andrew Rodriguez at [facility]. Will continue to follow for course of hospitalization.     Discharge Plan A and Plan B have been determined by review of patient's clinical status, future medical and therapeutic needs, and coverage/benefits for post-acute care in coordination with multidisciplinary team members.     Glory Lewis, LEIDY   Case Management Dept-[facility]  [phone]             "

## 2024-02-12 NOTE — CONSULTS
SHIMON placed a 4 fr x 12 cm midline in the Right basilic vein for pva using realtime ultrasound guidance.  Lot#[id number].  Max dwell date 3/12/24.  Imagery recorded and saved.

## 2024-02-12 NOTE — ASSESSMENT & PLAN NOTE
Patient was found to have thrombocytopenia, the likely etiology is secondary to alcohol use d/o but CLL is possible etiology as well, will monitor the platelets Daily. Will transfuse if platelet count is <50k (if undergoing surgical procedure or have active bleeding). Hold DVT prophylaxis if platelets are <30k due to hx of malignancy. The patient's platelet results have been reviewed and are listed below.  Recent Labs   Lab 02/12/24  0659   PLT 90*     HIT score 02/11 is zero

## 2024-02-12 NOTE — PROGRESS NOTES
[facility] - Telemetry Stepdown  Orthopedics  Progress Note    Patient Name: Earl Abdul  MRN: [id number]  Admission Date: 2/10/2024  Hospital Length of Stay: 2 days  Attending Provider: Lloyd Whitley MD  Primary Care Provider: Andrew Rodriguez MD    Subjective:     Principal Problem:Rhabdomyolysis    Principal Orthopedic Problem: as above    Interval History: Afebrile, VSS, NAEON.  Pain has been reportedly well controlled, however, she continues to have pain at the left elbow.  MRI obtained showing effusion; arthrocentesis performed at bedside with results pending.        Review of patient's allergies indicates:   Allergen Reactions    Lortab [hydrocodone-acetaminophen] Itching    Promethazine Itching and Other (See Comments)       Current Facility-Administered Medications   Medication    0.9%  NaCl infusion    azithromycin tablet 500 mg    cefTRIAXone (Rocephin) 1 g in dextrose 5 % in water (D5W) 100 mL IVPB (MB+)    dextrose 10% bolus 125 mL 125 mL    dextrose 10% bolus 250 mL 250 mL    EScitalopram oxalate tablet 10 mg    folic acid tablet 1 mg    glucagon (human recombinant) injection 1 mg    glucose chewable tablet 16 g    glucose chewable tablet 24 g    heparin (porcine) injection 5,000 Units    HYDROmorphone injection 0.5 mg    HYDROmorphone injection 0.5 mg    insulin aspart U-100 pen 0-5 Units    iohexoL (OMNIPAQUE 350) injection 100 mL    levothyroxine tablet 75 mcg    LORazepam injection 2 mg    LORazepam tablet 2 mg    melatonin tablet 6 mg    multivitamin tablet    naloxone 0.4 mg/mL injection 0.02 mg    predniSONE tablet 20 mg    sodium bicarbonate 150 mEq in dextrose 5 % (D5W) 1,000 mL infusion    sodium chloride 0.9% flush 10 mL    thiamine tablet 100 mg     Facility-Administered Medications Ordered in Other Encounters   Medication    albuterol sulfate nebulizer solution 2.5 mg     Objective:     Vital Signs (Most Recent):  Temp: 98.6 °F (37 °C) (02/12/24 0300)  Pulse: 88 (02/12/24 0300)  Resp:  "18 (02/12/24 0652)  BP: 130/60 (02/12/24 0300)  SpO2: (!) 94 % (02/12/24 0300) Vital Signs (24h Range):  Temp:  [97.9 °F (36.6 °C)-98.7 °F (37.1 °C)] 98.6 °F (37 °C)  Pulse:  [] 88  Resp:  [14-22] 18  SpO2:  [94 %-100 %] 94 %  BP: (130-189)/(58-93) 130/60     Weight: 86.2 kg (190 lb)  Height: 5' 6" (167.6 cm)  Body mass index is 30.67 kg/m².      Intake/Output Summary (Last 24 hours) at 2/12/2024 0706  Last data filed at 2/12/2024 0516  Gross per 24 hour   Intake 3971.17 ml   Output --   Net 3971.17 ml        Ortho/SPM Exam     Significant Labs: All pertinent labs within the past 24 hours have been reviewed.    Significant Imaging: I have reviewed all pertinent imaging results/findings.  Assessment/Plan:     Left forearm pain  Earl Abdul is a 63 y.o. female w/PMH of alcohol use disorder (with significant withdrawal history), T2DM, COPD, GERD, fibromyalgia, sarcoidosis, breast Ca, CLL (no current treatment), HTN, HLD, hypothyroidism, who presents with left forearm pain with associated left hand numbness/weakness.  Painful ROM at the left elbow also noted, and MRI was obtained showing an effusion.  CRP elevated to 75.  Due to concern for septic arthritis, the left elbow was aspirated at the bedside - see procedure note for further details. Remain strictly NPO pending joint aspiration results. Workup discussed with patient/family regarding possible septic joint along with necessary treatment pending aspiration results and all questions were answered.    » Further recommendations will be made this morning pending aspiration results     Procedure Note:  After consent was obtained, using sterile technique the left elbow was prepped and the joint was entered from from the lateral approach, taking care to avoid erythematous skin around the area of olecranon bursa. 8 ml's of straw-colored fluid was withdrawn and sent for analysis and culture.  The procedure was well tolerated.  The patient is asked to continue " to rest the left elbow for a few more days before resuming regular activities.  It may be more painful for the first 1-2 days.  Watch for fever, or increased swelling or persistent pain.           MAGALIE Boone MD  Orthopedics  [facility] - Telemetry Stepdown

## 2024-02-12 NOTE — PROGRESS NOTES
[facility] - Telemetry [facility] Medicine  Progress Note    Patient Name: Earl Abdul  MRN: [id number]  Patient Class: IP- Inpatient   Admission Date: 2/10/2024  Length of Stay: 2 days  Attending Physician: Lloyd Whitley MD  Primary Care Provider: Andrew Rodriguez MD        Subjective:     Principal Problem:Rhabdomyolysis        HPI:  Ms. Earl Abdul is a 62 y/o F with hx of EtOH use disorder c/b severe withdrawals, HTN, HLD, R breast cancer s/p bilateral mastectomy previously on Letrozole, CLL not currently on any tx, IDDM2, depression who presented to the ED for evaluation of recurrent falls s/o EtOH intoxication. Patient recently admitted to [facility] 2/2-2/3 for management of EtOH withdrawal, however left AMA prior to completion of treatment. Since that time, patient reports continued heavy drinking (5th vodka each day since discharge). Patient states she drank heavily last night and passed out. She woke up today and subsequently fell and passed out again, reportedly hitting her head and her arm. Last drink 20 hours prior to admission. Reports that the drinking started after the loss of her son; has expressed interest in wanting to quit.     In the ED, patient afebrile, SBP 140s, tachy to 130s, tachynpeic, satting well on RA. UTox positive for alcohol. CBC showing leukocytosis to 15, hgb at baseline 10.6, plt 108. CMP with creatinine elevated to 2.3 from BL 1. CPK 25,000.      Overview/Hospital Course:  Admitted for traumatic rhabdo in the setting of heavy alcohol use, DEVANTE secondary to rhabdo, and c/o alcohol withdrawal. Transitioned from Ativan 2mg IV PRN for CIWA>8 to scheduled Ativan 2mg PO q8 taper for c/o alcohol withdrawal with hx of complicated EtOH withdrawal admissions. CK >25k. Given 3L of NS upon admission; added 2L more of IVF to assist with washout of CK and improve DEVANTE. Cr trending down with IVF. Ortho tapped left elbow. CK levels improved.    Interval History: NAEO. VSS. CK halved.  Ortho tapped left elbow.    Review of Systems   Unable to perform ROS: Acuity of condition     Objective:     Vital Signs (Most Recent):  Temp: 98.9 °F (37.2 °C) (02/12/24 1134)  Pulse: 97 (02/12/24 1134)  Resp: 19 (02/12/24 1134)  BP: (!) 171/77 (02/12/24 1134)  SpO2: (!) 94 % (02/12/24 1134) Vital Signs (24h Range):  Temp:  [97.6 °F (36.4 °C)-98.9 °F (37.2 °C)] 98.9 °F (37.2 °C)  Pulse:  [] 97  Resp:  [14-20] 19  SpO2:  [94 %-97 %] 94 %  BP: (129-176)/(58-93) 171/77     Weight: 86.2 kg (190 lb)  Body mass index is 30.67 kg/m².    Intake/Output Summary (Last 24 hours) at 2/12/2024 1315  Last data filed at 2/12/2024 0516  Gross per 24 hour   Intake 3611.17 ml   Output --   Net 3611.17 ml         Physical Exam  Vitals and nursing note reviewed.   Constitutional:       General: She is not in acute distress.     Appearance: She is obese. She is ill-appearing.   HENT:      Head: Normocephalic and atraumatic.      Nose:      Comments: NC  Eyes:      General: No scleral icterus.     Extraocular Movements: Extraocular movements intact.      Conjunctiva/sclera: Conjunctivae normal.   Cardiovascular:      Rate and Rhythm: Normal rate and regular rhythm.      Pulses: Normal pulses.      Heart sounds: Normal heart sounds.   Pulmonary:      Comments: Coarse breathing  Abdominal:      General: Bowel sounds are normal. There is no distension.      Palpations: Abdomen is soft.      Tenderness: There is no abdominal tenderness. There is no guarding.   Musculoskeletal:         General: Normal range of motion.      Right lower leg: No edema.      Left lower leg: No edema.      Comments: Left lower arm bandaged. Band aid at site of left elbow aspiration.   Neurological:      General: No focal deficit present.      Mental Status: She is alert. Mental status is at baseline.   Psychiatric:         Mood and Affect: Mood normal.         Behavior: Behavior normal.             Significant Labs: All pertinent labs within the past 24  hours have been reviewed.  CBC:   Recent Labs   Lab 02/11/24  0539 02/12/24  0659   WBC 7.80 8.12   HGB 9.3* 8.2*   HCT 31.1* 27.5*   PLT 84* 90*     CMP:   Recent Labs   Lab 02/11/24  0539 02/12/24  0659    134*   K 3.8 3.4*    106   CO2 19* 23   * 99   BUN 19 10   CREATININE 2.0* 1.3   CALCIUM 8.0* 7.4*   PROT 5.8* 5.5*   ALBUMIN 3.2* 2.9*   BILITOT 0.8 0.4   ALKPHOS 49* 42*   * 312*   * 146*   ANIONGAP 10 5*       Significant Imaging: I have reviewed all pertinent imaging results/findings within the past 24 hours.    Assessment/Plan:      * Rhabdomyolysis  Encountered a fall 02/09.  found her on the floor, and called EMS. Presenting with muscle pain and weakness.  CT head w/o acute intracranial pathology. CK >25K. UA with 3+ blood, 8 RBC.  2L NS bolus given in the ED.     Plan:   - NS infusion  - trend CK  - daily CMP  - monitor UOP  - strict I/O  - replenish lytes appropriately  - Ortho consulted by ED for evaluation of L arm for c/o compartment syndrome. XR of L arm w/o acute fractures or dislocation; appreciate their recs  - ortho recommending MRI of L forearm, and sling  - IV dilaudid 0.5mg q6hr PRN for pain        Alcohol use disorder, severe, dependence        Type 2 diabetes mellitus with hypoglycemia  Patient's FSGs are controlled on current medication regimen.  Last A1c reviewed-   Lab Results   Component Value Date    HGBA1C 6.0 (H) 02/11/2024     Most recent fingerstick glucose reviewed-   Recent Labs   Lab 02/11/24  1755 02/11/24  2353 02/12/24  0532   POCTGLUCOSE 142* 117* 135*     Current correctional scale  Low  Maintain anti-hyperglycemic dose as follows-   Antihyperglycemics (From admission, onward)      Start     Stop Route Frequency Ordered    02/10/24 1821  insulin aspart U-100 pen 0-5 Units         -- SubQ Every 6 hours PRN 02/10/24 1721          Hold Oral hypoglycemics while patient is in the hospital.    Plan:   - POCT glucose   - Diabetic diet   -  BGL inpatient goal is 140-180    Left forearm pain        CLL (chronic lymphocytic leukemia)  Not on any treatment for CLL. Seen by Dr. Montano in the past. WBC elevated on admission.     Plan:   - daily CBC  - can consider heme-onc consult to potentially start therapy      Hypothyroidism  - continue home synthroid      Malignant neoplasm of central portion of right breast in female, estrogen receptor positive  Last clinic visit 6/30/22  Letrozole started in February 2021.  Transitioned to anastrozole but patient not taking.      Thrombocytopenia  Patient was found to have thrombocytopenia, the likely etiology is secondary to alcohol use d/o but CLL is possible etiology as well, will monitor the platelets Daily. Will transfuse if platelet count is <50k (if undergoing surgical procedure or have active bleeding). Hold DVT prophylaxis if platelets are <30k due to hx of malignancy. The patient's platelet results have been reviewed and are listed below.  Recent Labs   Lab 02/12/24  0659   PLT 90*     HIT score 02/11 is zero      Hyperlipidemia  Chronic. Statin prescribed but patient not taking.       DEVANTE (acute kidney injury)  Patient with acute kidney injury/acute renal failure likely due to acute tubular necrosis caused by traumatic rhabdo  DEVANTE is currently worsening. Baseline creatinine  0.9  - Labs reviewed- Renal function/electrolytes with Estimated Creatinine Clearance: 47.7 mL/min (based on SCr of 1.3 mg/dL). according to latest data. Monitor urine output and serial BMP and adjust therapy as needed. Avoid nephrotoxins and renally dose meds for GFR listed above.    Improved    Plan:   - daily CMP  - monitor UOP  - strict I/O  - replenish lytes appropriately  - IVF for traumatic rhabdo  - hold losartan-HCTZ in the setting of DEVANTE     Sarcoidosis  Chronic. 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 Patient reports that the rash on her medical R ankle is similar to her previous erythema  nodosum episode that required biopsy.     Plan:   - started pred 20mg daily for 7 days; unable to use first line therapy (NSAIDS) due to DEVANTE  - if persistent erythema, can consider rheum consult    Essential hypertension  Chronic, controlled. Latest blood pressure and vitals reviewed-     Temp:  [97.6 °F (36.4 °C)-98.9 °F (37.2 °C)]   Pulse:  []   Resp:  [14-20]   BP: (129-176)/(58-93)   SpO2:  [94 %-97 %] .   Home meds for hypertension were reviewed and noted below.   Hypertension Medications               losartan-hydrochlorothiazide 100-25 mg (HYZAAR) 100-25 mg per tablet Take 1 tablet by mouth once daily.            While in the hospital, will manage blood pressure as follows; Adjust home antihypertensive regimen as follows- hold home meds in the setting of DEVANTE secondary to rhabdo    Will utilize p.r.n. blood pressure medication only if patient's blood pressure greater than 180/110 and she develops symptoms such as worsening chest pain or shortness of breath.    Plan:   - given clonidine 0.1mg once to control BP  - holding off on starting home losartan-HCTZ given her DEVANTE    Alcohol withdrawal  Alcohol use disorder, severe, dependence    Treatment with 1 mg Ativan followed by phenobarbital initiated in ED.    Plan:   - CT abdomen 02/10 with signs of nodular liver; pending US of liver with doppler for further eval --> US findings consistent with cirrhosis morphology.  Alcohol Withdrawal precautions:  - Vitals q4h while awake  - CIWA monitoring  - Lorazepam (not on diazepam due to concerns of nodular liver) 2mg PO q8  - Lorazepam 2mg IV q2hr PRN for CIWA>8  - Start Vitamin supplementation- Thiamine, Folic acid, Vit. B12, and Multivitamin qd  - seizure precautions  - fall precautions  - aspirations precautions  - addiction psych consulted; appreciate their assistance      VTE Risk Mitigation (From admission, onward)           Ordered     heparin (porcine) injection 5,000 Units  Every 8 hours         02/10/24  1707     IP VTE HIGH RISK PATIENT  Once         02/10/24 1707     Place sequential compression device  Until discontinued         02/10/24 1707                    Discharge Planning   BECCA:      Code Status: Full Code   Is the patient medically ready for discharge?:     Reason for patient still in hospital (select all that apply): Patient trending condition, Laboratory test, Treatment, Consult recommendations, PT / OT recommendations, and Pending disposition                     Sg Yeboah MD  Department of Hospital Medicine   [facility] - Telemetry Stepdown

## 2024-02-12 NOTE — ASSESSMENT & PLAN NOTE
Patient with acute kidney injury/acute renal failure likely due to acute tubular necrosis caused by traumatic rhabdo  DEVANTE is currently worsening. Baseline creatinine  0.9  - Labs reviewed- Renal function/electrolytes with Estimated Creatinine Clearance: 47.7 mL/min (based on SCr of 1.3 mg/dL). according to latest data. Monitor urine output and serial BMP and adjust therapy as needed. Avoid nephrotoxins and renally dose meds for GFR listed above.    Improved    Plan:   - daily CMP  - monitor UOP  - strict I/O  - replenish lytes appropriately  - IVF for traumatic rhabdo  - hold losartan-HCTZ in the setting of DEVANTE

## 2024-02-12 NOTE — PLAN OF CARE
Problem: Adult Inpatient Plan of Care  Goal: Plan of Care Review  Outcome: Ongoing, Progressing  Goal: Patient-Specific Goal (Individualized)  Outcome: Ongoing, Progressing  Goal: Absence of Hospital-Acquired Illness or Injury  Outcome: Ongoing, Progressing  Intervention: Identify and Manage Fall Risk  Flowsheets (Taken 2/12/2024 0408)  Safety Promotion/Fall Prevention:   assistive device/personal item within reach   bed alarm set   commode/urinal/bedpan at bedside   Fall Risk reviewed with patient/family   Fall Risk signage in place   medications reviewed   lighting adjusted   nonskid shoes/socks when out of bed   /camera at bedside   room near unit station   side rails raised x 3   instructed to call staff for mobility  Intervention: Prevent Skin Injury  Flowsheets (Taken 2/12/2024 0408)  Body Position: position changed independently  Skin Protection: adhesive use limited  Intervention: Prevent and Manage VTE (Venous Thromboembolism) Risk  Flowsheets (Taken 2/12/2024 0408)  Activity Management: Up to bedside commode - L3  VTE Prevention/Management:   bleeding precations maintained   bleeding risk assessed   ambulation promoted  Range of Motion: active ROM (range of motion) encouraged  Goal: Optimal Comfort and Wellbeing  Outcome: Ongoing, Progressing  Intervention: Monitor Pain and Promote Comfort  Flowsheets (Taken 2/12/2024 0408)  Pain Management Interventions:   care clustered   medication offered   pillow support provided  Intervention: Provide Person-Centered Care  Flowsheets (Taken 2/12/2024 0408)  Trust Relationship/Rapport:   care explained   choices provided   emotional support provided   empathic listening provided   questions answered   questions encouraged   reassurance provided   thoughts/feelings acknowledged   Patient awake, alert , oriented.  Pain assessed, PRN pain medication administered per order.  pt educated on safety, and medication use. Pt verbalized understanding.  Plan of  care discussed with patient, patient verbalized understanding.  Bed alarm on. Call light within reach.  Bed  low and locked.

## 2024-02-12 NOTE — ASSESSMENT & PLAN NOTE
Chronic, controlled. Latest blood pressure and vitals reviewed-     Temp:  [97.6 °F (36.4 °C)-98.9 °F (37.2 °C)]   Pulse:  []   Resp:  [14-20]   BP: (129-176)/(58-93)   SpO2:  [94 %-97 %] .   Home meds for hypertension were reviewed and noted below.   Hypertension Medications               losartan-hydrochlorothiazide 100-25 mg (HYZAAR) 100-25 mg per tablet Take 1 tablet by mouth once daily.            While in the hospital, will manage blood pressure as follows; Adjust home antihypertensive regimen as follows- hold home meds in the setting of DEVANTE secondary to rhabdo    Will utilize p.r.n. blood pressure medication only if patient's blood pressure greater than 180/110 and she develops symptoms such as worsening chest pain or shortness of breath.    Plan:   - given clonidine 0.1mg once to control BP  - holding off on starting home losartan-HCTZ given her DEVANTE

## 2024-02-12 NOTE — CARE UPDATE
Arthrocentesis results from this morning show patient does not have septic arthritis.  Okay for diet, WBAT, ROMAT, sling for comfort, daily pt/ot.      Joint Fluid Analysis:  Cultures pending, will continue to follow   Lab Results   Component Value Date    WBCBF 1625 02/12/2024    SEGS 85 02/12/2024    CRYST Negative 02/12/2024    BODYFLUIDTYP Other (Specify) 02/12/2024     Lab Results   Component Value Date    LABGRAM Rare WBC's 02/12/2024    LABGRAM No organisms seen 02/12/2024

## 2024-02-12 NOTE — ASSESSMENT & PLAN NOTE
Patient's FSGs are controlled on current medication regimen.  Last A1c reviewed-   Lab Results   Component Value Date    HGBA1C 6.0 (H) 02/11/2024     Most recent fingerstick glucose reviewed-   Recent Labs   Lab 02/11/24  1755 02/11/24  2353 02/12/24  0532   POCTGLUCOSE 142* 117* 135*     Current correctional scale  Low  Maintain anti-hyperglycemic dose as follows-   Antihyperglycemics (From admission, onward)    Start     Stop Route Frequency Ordered    02/10/24 1821  insulin aspart U-100 pen 0-5 Units         -- SubQ Every 6 hours PRN 02/10/24 1721        Hold Oral hypoglycemics while patient is in the hospital.    Plan:   - POCT glucose   - Diabetic diet   - BGL inpatient goal is 140-180

## 2024-02-12 NOTE — PT/OT/SLP EVAL
Occupational Therapy   Evaluation/tx    Name: Earl Abdul  MRN: [id number]  Admitting Diagnosis: Rhabdomyolysis  Recent Surgery: * No surgery found *      Recommendations:     Discharge Recommendations: Moderate Intensity Therapy  Discharge Equipment Recommendations:  shower chair, walker, rolling  Barriers to discharge:  Decreased caregiver support    Assessment:     Earl Abdul is a 63 y.o. female with a medical diagnosis of Rhabdomyolysis.  She presents with significantly decreased ROM in LUE, difficulty with ADL task performance and assist required for mobility. Pt. Is a High fall risk and is alone during the day as her spouse works.  Patient would benefit from continued OT services to maximize level of safety and independence with self-care tasks.   . Performance deficits affecting function: weakness, impaired endurance, impaired self care skills, impaired functional mobility, impaired sensation, gait instability, decreased coordination, decreased upper extremity function, decreased safety awareness, pain, impaired skin, decreased ROM, impaired fine motor, impaired coordination, orthopedic precautions.      Rehab Prognosis: Good; patient would benefit from acute skilled OT services to address these deficits and reach maximum level of function.       Plan:     Patient to be seen 4 x/week to address the above listed problems via self-care/home management, therapeutic activities, therapeutic exercises, neuromuscular re-education  Plan of Care Expires: 03/13/24  Plan of Care Reviewed with: patient    Subjective     Chief Complaint: I cannot move this arm (referring to left UE  Patient/Family Comments/goals: to get better     Occupational Profile:  Living Environment: pt. Resides in split level home with spouse who does work during the day. Pt. Has 3 to 4 steps to get in front door and 17 steps to bedroom and 15 to bathroom. Pt. Has HR on right side. Pt. Has both a tub shower and a WIS. No seat  Previous  level of function: pt. Was completely I with ADL tasks/mobility  Roles and Routines: caretaker of self, spouse, community dweller, retired, community dweller  Equipment Used at Home: oxygen (2 liters at night per pt.)  Assistance upon Discharge: spouse works    Pain/Comfort:  Pain Rating 1:  (did not rate)  Location - Side 1: Left  Location 1: arm  Pain Addressed 1: Reposition, Distraction, Nurse notified  Pain Rating Post-Intervention 1:  (no increases in pain noted)    Patients cultural, spiritual, Advent conflicts given the current situation: no    Objective:     Communicated with: nurse prior to session.  Patient found supine with telemetry, peripheral IV, oxygen, pulse ox (continuous) upon OT entry to room. LUE forearm wrap and splinted. Spouse present. Sling not on pt. But for comfort.     General Precautions: Standard, fall, seizure, diabetic, aspiration  Orthopedic Precautions: LUE weight bearing as tolerated (LUE WBAT/ROMAT  per Dr. Boone (ortho) on this date)  Braces: UE Sling (sling for comfort per note (ortho))  Respiratory Status: Nasal cannula, flow 2 L/min    Occupational Performance:    Bed Mobility:    Patient completed Supine to Sit with moderate assistance    Functional Mobility/Transfers:  Patient completed Sit <> Stand Transfer with minimum assistance  with  no assistive device   Patient completed Toilet Transfer Stand Pivot technique with minimum assistance with  no AD  Functional Mobility: pt. Required Min A to ambulate to and from the bathroom    Activities of Daily Living:  Upper Body Dressing: minimum assistance to don fresh kenan; educated on proper technique for donning clothes  Toileting: moderate assistance for clothing management     Cognitive/Visual Perceptual:  Cognitive/Psychosocial Skills:     -       Oriented to: Person, Place, Time, and Situation   -       Follows Commands/attention:Follows one-step commands  -       Communication: clear/fluent  -       Memory: No Deficits  noted  -       Safety awareness/insight to disability: intact   -       Mood/Affect/Coping skills/emotional control: Appropriate to situation  Visual/Perceptual:      -Intact .    Physical Exam:  Balance: -       sit: good; stand Min A  Postural examination/scapula alignment:    -       Rounded shoulders  -       Forward head  -       Posterior pelvic tilt  Edema:  Moderate in LUE  Dominant hand: -       right  Upper Extremity Range of Motion:     -       Right Upper Extremity: WNL  -       Left Upper Extremity: minimal active finger flex/ext; AAROM elbow flex/ext and shoulder flex/ext  Fine Motor Coordination:impaired in LUE  Sensation : impaired in LUE  AMPA 6 Click ADL:  [facility] Total Score: 17    Treatment & Education:  Pt. Educated on role of OT and POC  Pt. Educated on elevating LUE, AAROM to digits, for elbow flex/ext and shoulder flex/ext    Patient left up in chair with all lines intact, call button in reach, nurse notified, spouse present, and LLUE elevated on 3 pillows    GOALS:   Multidisciplinary Problems       Occupational Therapy Goals          Problem: Occupational Therapy    Goal Priority Disciplines Outcome Interventions   Occupational Therapy Goal     OT, PT/OT Ongoing, Progressing    Description: Goals to be met by: 02-26-24     Patient will increase functional independence with ADLs by performing:    UE Dressing with Mod I  LE Dressing with Supervision.  Grooming while standing at sink with Supervision.  Toileting from toilet with Supervision for hygiene and clothing management.   Supine to sit with Supervision.  Stand pivot transfers with Supervision.  Toilet transfer to toilet with Supervision.  Pt. And family to Be Independent with HEP to RUE to improve ROM                       History:     Past Medical History:   Diagnosis Date    Alcoholism     c/b alcohol withdrawl seizures 7/2017    Anemia     Cancer of breast 10/2020    s/p bilateral mastectomy for  T1b N0 stage [place] breast cancer October  2020    CLL (chronic lymphocytic leukemia) 09/30/2022 6/22/22 - PB flow cytometry  Immunophenotyping of peripheral blood detects a distinct kappa light chain restricted monoclonal B-cell population  (calculated at 2.44x10 9 /L, from the most recent CBC showing a total WBC of  7.35 K/uL with 61% total lymphocytes)  with a CLL phenotype (coexpression of CD19, CD5, CD23 and dim CD20). CD22 (FITC), FMC-7 and CD38 are negative in this population.    Controlled type 2 diabetes mellitus without complication, without long-term current use of insulin 11/30/2021    COPD (chronic obstructive pulmonary disease)     Depression     Diverticulitis     Fatty liver     GERD (gastroesophageal reflux disease)     Hyperlipidemia     Hypertension     Pancreatitis     Peptic ulcer disease     Polysubstance abuse     Posterior reversible encephalopathy syndrome     Sarcoidosis of lung     over 30 yrs ago    Suicide attempt          Past Surgical History:   Procedure Laterality Date    APPENDECTOMY      BILATERAL MASTECTOMY Bilateral 10/29/2020    Procedure: MASTECTOMY, BILATERAL;  Surgeon: Baylee Kevin MD;  Location: [facility] OR [address] [place] [address];  Service: General;  Laterality: Bilateral;    BREAST REVISION SURGERY Bilateral 2/11/2021    Procedure: BREAST REVISION SURGERY;  Surgeon: Scottie Johnson MD;  Location: [facility] OR [address] [place] [address];  Service: Plastics;  Laterality: Bilateral;    COLONOSCOPY N/A 7/28/2017    Procedure: COLONOSCOPY;  Surgeon: Aaron Alvarado MD;  Location: [facility];  Service: Endoscopy;  Laterality: N/A;    ESOPHAGOGASTRODUODENOSCOPY  10/7/2016, 11/6/2014    2016 - gastritis, duodenitis, 2014 erosive gastritis    ESOPHAGOGASTRODUODENOSCOPY N/A 2/11/2020    Procedure: ESOPHAGOGASTRODUODENOSCOPY (EGD);  Surgeon: Fawn Garrido MD;  Location: [facility];  Service: Endoscopy;  Laterality: N/A;    ESOPHAGOGASTRODUODENOSCOPY N/A 4/19/2021    Procedure: EGD (ESOPHAGOGASTRODUODENOSCOPY);  Surgeon: Paramjit Martino MD;  Location:  [facility] ENDO;  Service: Endoscopy;  Laterality: N/A;    FLEXIBLE SIGMOIDOSCOPY  11/06/2014    colitis    HYSTERECTOMY      IMPLANTATION OF PERMANENT SACRAL NERVE STIMULATOR N/A 7/12/2022    Procedure: INSERTION, NEUROSTIMULATOR, PERMANENT, SACRAL;  Surgeon: Juaquin Edwards MD;  Location: [address];  Service: Urology;  Laterality: N/A;  1hr    INJECTION FOR SENTINEL NODE IDENTIFICATION Right 10/29/2020    Procedure: INJECTION, FOR SENTINEL NODE IDENTIFICATION;  Surgeon: Baylee Kevin MD;  Location: [address];  Service: General;  Laterality: Right;    INJECTION OF JOINT Right 10/10/2019    Procedure: Injection, Joint RIGHT ILIOPSOAS BURSA/TENDON INJECTION AND RIGHT GLUTEAL TENDON INJECTION WITH STEROID AND LIDOCAINE;  Surgeon: Guillaume Rico MD;  Location: [facility] PAIN MGT;  Service: Pain Management;  Laterality: Right;  NEEDS CONSENT    INSERTION OF BREAST TISSUE EXPANDER Bilateral 10/29/2020    Procedure: INSERTION, TISSUE EXPANDER, BREAST;  Surgeon: Scottie Johnson MD;  Location: [address];  Service: Plastics;  Laterality: Bilateral;  Right breast: 1082 g  Left breast: 1076 g    LIPOSUCTION Bilateral 2/11/2021    Procedure: LIPOSUCTION;  Surgeon: Scottie Johnson MD;  Location: [address];  Service: Plastics;  Laterality: Bilateral;    mediastenoscopy      REPLACEMENT OF IMPLANT OF BREAST Bilateral 2/11/2021    Procedure: REPLACEMENT, IMPLANT, BREAST;  Surgeon: Scottie Johnson MD;  Location: [address];  Service: Plastics;  Laterality: Bilateral;    SENTINEL LYMPH NODE BIOPSY Right 10/29/2020    Procedure: BIOPSY, LYMPH NODE, SENTINEL;  Surgeon: Baylee Kevin MD;  Location: [address];  Service: General;  Laterality: Right;    TONSILLECTOMY N/A 1970    TUBAL LIGATION         Time Tracking:     OT Date of Treatment: 02/12/24  OT Start Time: 1353  OT Stop Time: 1424  OT Total Time (min): 31 min    Billable Minutes:Evaluation 16  Self Care/Home Management  15    2/12/2024

## 2024-02-12 NOTE — PLAN OF CARE
Problem: Occupational Therapy  Goal: Occupational Therapy Goal  Description: Goals to be met by: 02-26-24     Patient will increase functional independence with ADLs by performing:    UE Dressing with Mod I  LE Dressing with Supervision.  Grooming while standing at sink with Supervision.  Toileting from toilet with Supervision for hygiene and clothing management.   Supine to sit with Supervision.  Stand pivot transfers with Supervision.  Toilet transfer to toilet with Supervision.  Pt. And family to Be Independent with HEP to RUE to improve ROM  Outcome: Ongoing, Progressing

## 2024-02-12 NOTE — ASSESSMENT & PLAN NOTE
Earl Abdul is a 63 y.o. female w/PMH of alcohol use disorder (with significant withdrawal history), T2DM, COPD, GERD, fibromyalgia, sarcoidosis, breast Ca, CLL (no current treatment), HTN, HLD, hypothyroidism, who presents with left forearm pain with associated left hand numbness/weakness.  Painful ROM at the left elbow also noted, and MRI was obtained showing an effusion.  CRP elevated to 75.  Due to concern for septic arthritis, the left elbow was aspirated at the bedside - see procedure note for further details. Remain strictly NPO pending joint aspiration results. Workup discussed with patient/family regarding possible septic joint along with necessary treatment pending aspiration results and all questions were answered.    » Further recommendations will be made this morning pending aspiration results     Procedure Note:  After consent was obtained, using sterile technique the left elbow was prepped and the joint was entered from from the lateral approach, taking care to avoid erythematous skin around the area of olecranon bursa. 8 ml's of straw-colored fluid was withdrawn and sent for analysis and culture.  The procedure was well tolerated.  The patient is asked to continue to rest the left elbow for a few more days before resuming regular activities.  It may be more painful for the first 1-2 days.  Watch for fever, or increased swelling or persistent pain.

## 2024-02-12 NOTE — SUBJECTIVE & OBJECTIVE
Principal Problem:Rhabdomyolysis    Principal Orthopedic Problem: as above    Interval History: Afebrile, VSS, NAEON.  Pain has been reportedly well controlled, however, she continues to have pain at the left elbow.  MRI obtained showing effusion; arthrocentesis performed at bedside with results pending.        Review of patient's allergies indicates:   Allergen Reactions    Lortab [hydrocodone-acetaminophen] Itching    Promethazine Itching and Other (See Comments)       Current Facility-Administered Medications   Medication    0.9%  NaCl infusion    azithromycin tablet 500 mg    cefTRIAXone (Rocephin) 1 g in dextrose 5 % in water (D5W) 100 mL IVPB (MB+)    dextrose 10% bolus 125 mL 125 mL    dextrose 10% bolus 250 mL 250 mL    EScitalopram oxalate tablet 10 mg    folic acid tablet 1 mg    glucagon (human recombinant) injection 1 mg    glucose chewable tablet 16 g    glucose chewable tablet 24 g    heparin (porcine) injection 5,000 Units    HYDROmorphone injection 0.5 mg    HYDROmorphone injection 0.5 mg    insulin aspart U-100 pen 0-5 Units    iohexoL (OMNIPAQUE 350) injection 100 mL    levothyroxine tablet 75 mcg    LORazepam injection 2 mg    LORazepam tablet 2 mg    melatonin tablet 6 mg    multivitamin tablet    naloxone 0.4 mg/mL injection 0.02 mg    predniSONE tablet 20 mg    sodium bicarbonate 150 mEq in dextrose 5 % (D5W) 1,000 mL infusion    sodium chloride 0.9% flush 10 mL    thiamine tablet 100 mg     Facility-Administered Medications Ordered in Other Encounters   Medication    albuterol sulfate nebulizer solution 2.5 mg     Objective:     Vital Signs (Most Recent):  Temp: 98.6 °F (37 °C) (02/12/24 0300)  Pulse: 88 (02/12/24 0300)  Resp: 18 (02/12/24 0652)  BP: 130/60 (02/12/24 0300)  SpO2: (!) 94 % (02/12/24 0300) Vital Signs (24h Range):  Temp:  [97.9 °F (36.6 °C)-98.7 °F (37.1 °C)] 98.6 °F (37 °C)  Pulse:  [] 88  Resp:  [14-22] 18  SpO2:  [94 %-100 %] 94 %  BP: (130-189)/(58-93) 130/60  "    Weight: 86.2 kg (190 lb)  Height: 5' 6" (167.6 cm)  Body mass index is 30.67 kg/m².      Intake/Output Summary (Last 24 hours) at 2/12/2024 0706  Last data filed at 2/12/2024 0516  Gross per 24 hour   Intake 3971.17 ml   Output --   Net 3971.17 ml        Ortho/SPM Exam     Significant Labs: All pertinent labs within the past 24 hours have been reviewed.    Significant Imaging: I have reviewed all pertinent imaging results/findings.  "

## 2024-02-13 LAB
ALBUMIN SERPL BCP-MCNC: 2.9 G/DL (ref 3.5–5.2)
ALP SERPL-CCNC: 44 U/L (ref 55–135)
ALT SERPL W/O P-5'-P-CCNC: 143 U/L (ref 10–44)
ANION GAP SERPL CALC-SCNC: 9 MMOL/L (ref 8–16)
AST SERPL-CCNC: 224 U/L (ref 10–40)
BASOPHILS # BLD AUTO: 0.02 K/UL (ref 0–0.2)
BASOPHILS NFR BLD: 0.3 % (ref 0–1.9)
BILIRUB SERPL-MCNC: 0.4 MG/DL (ref 0.1–1)
BUN SERPL-MCNC: 7 MG/DL (ref 8–23)
CALCIUM SERPL-MCNC: 8.2 MG/DL (ref 8.7–10.5)
CHLORIDE SERPL-SCNC: 106 MMOL/L (ref 95–110)
CK SERPL-CCNC: 6288 U/L (ref 20–180)
CO2 SERPL-SCNC: 23 MMOL/L (ref 23–29)
CREAT SERPL-MCNC: 0.8 MG/DL (ref 0.5–1.4)
DIFFERENTIAL METHOD BLD: ABNORMAL
EOSINOPHIL # BLD AUTO: 0.1 K/UL (ref 0–0.5)
EOSINOPHIL NFR BLD: 0.8 % (ref 0–8)
ERYTHROCYTE [DISTWIDTH] IN BLOOD BY AUTOMATED COUNT: 16.8 % (ref 11.5–14.5)
EST. GFR  (NO RACE VARIABLE): >60 ML/MIN/1.73 M^2
GLUCOSE SERPL-MCNC: 88 MG/DL (ref 70–110)
HCT VFR BLD AUTO: 29 % (ref 37–48.5)
HGB BLD-MCNC: 8.6 G/DL (ref 12–16)
IMM GRANULOCYTES # BLD AUTO: 0.02 K/UL (ref 0–0.04)
IMM GRANULOCYTES NFR BLD AUTO: 0.3 % (ref 0–0.5)
LYMPHOCYTES # BLD AUTO: 3.9 K/UL (ref 1–4.8)
LYMPHOCYTES NFR BLD: 63.9 % (ref 18–48)
MAGNESIUM SERPL-MCNC: 1.5 MG/DL (ref 1.6–2.6)
MCH RBC QN AUTO: 25.7 PG (ref 27–31)
MCHC RBC AUTO-ENTMCNC: 29.7 G/DL (ref 32–36)
MCV RBC AUTO: 87 FL (ref 82–98)
MONOCYTES # BLD AUTO: 0.4 K/UL (ref 0.3–1)
MONOCYTES NFR BLD: 6.3 % (ref 4–15)
NEUTROPHILS # BLD AUTO: 1.7 K/UL (ref 1.8–7.7)
NEUTROPHILS NFR BLD: 28.4 % (ref 38–73)
NRBC BLD-RTO: 0 /100 WBC
PHOSPHATE SERPL-MCNC: 4.4 MG/DL (ref 2.7–4.5)
PLATELET # BLD AUTO: 85 K/UL (ref 150–450)
PMV BLD AUTO: 10.3 FL (ref 9.2–12.9)
POCT GLUCOSE: 133 MG/DL (ref 70–110)
POCT GLUCOSE: 141 MG/DL (ref 70–110)
POCT GLUCOSE: 182 MG/DL (ref 70–110)
POCT GLUCOSE: 84 MG/DL (ref 70–110)
POTASSIUM SERPL-SCNC: 3.5 MMOL/L (ref 3.5–5.1)
PROT SERPL-MCNC: 5.5 G/DL (ref 6–8.4)
RBC # BLD AUTO: 3.34 M/UL (ref 4–5.4)
SODIUM SERPL-SCNC: 138 MMOL/L (ref 136–145)
WBC # BLD AUTO: 6.07 K/UL (ref 3.9–12.7)

## 2024-02-13 PROCEDURE — 97110 THERAPEUTIC EXERCISES: CPT | Mod: CO

## 2024-02-13 PROCEDURE — A4216 STERILE WATER/SALINE, 10 ML: HCPCS | Performed by: STUDENT IN AN ORGANIZED HEALTH CARE EDUCATION/TRAINING PROGRAM

## 2024-02-13 PROCEDURE — 85025 COMPLETE CBC W/AUTO DIFF WBC: CPT

## 2024-02-13 PROCEDURE — 99233 SBSQ HOSP IP/OBS HIGH 50: CPT | Mod: GT,,, | Performed by: STUDENT IN AN ORGANIZED HEALTH CARE EDUCATION/TRAINING PROGRAM

## 2024-02-13 PROCEDURE — 94761 N-INVAS EAR/PLS OXIMETRY MLT: CPT

## 2024-02-13 PROCEDURE — 25000003 PHARM REV CODE 250

## 2024-02-13 PROCEDURE — 20600001 HC STEP DOWN PRIVATE ROOM

## 2024-02-13 PROCEDURE — 80053 COMPREHEN METABOLIC PANEL: CPT

## 2024-02-13 PROCEDURE — 83735 ASSAY OF MAGNESIUM: CPT

## 2024-02-13 PROCEDURE — 36415 COLL VENOUS BLD VENIPUNCTURE: CPT

## 2024-02-13 PROCEDURE — 63600175 PHARM REV CODE 636 W HCPCS: Mod: JZ,JG

## 2024-02-13 PROCEDURE — 84100 ASSAY OF PHOSPHORUS: CPT

## 2024-02-13 PROCEDURE — 97535 SELF CARE MNGMENT TRAINING: CPT | Mod: CO

## 2024-02-13 PROCEDURE — 82550 ASSAY OF CK (CPK): CPT

## 2024-02-13 PROCEDURE — 63600175 PHARM REV CODE 636 W HCPCS

## 2024-02-13 PROCEDURE — 97530 THERAPEUTIC ACTIVITIES: CPT | Mod: CO

## 2024-02-13 PROCEDURE — 25000003 PHARM REV CODE 250: Performed by: STUDENT IN AN ORGANIZED HEALTH CARE EDUCATION/TRAINING PROGRAM

## 2024-02-13 RX ORDER — LABETALOL HCL 20 MG/4 ML
10 SYRINGE (ML) INTRAVENOUS EVERY 6 HOURS PRN
Status: DISCONTINUED | OUTPATIENT
Start: 2024-02-13 | End: 2024-02-14 | Stop reason: HOSPADM

## 2024-02-13 RX ORDER — LOSARTAN POTASSIUM AND HYDROCHLOROTHIAZIDE 25; 100 MG/1; MG/1
1 TABLET ORAL DAILY
Status: DISCONTINUED | OUTPATIENT
Start: 2024-02-13 | End: 2024-02-14 | Stop reason: HOSPADM

## 2024-02-13 RX ORDER — MAGNESIUM SULFATE HEPTAHYDRATE 40 MG/ML
2 INJECTION, SOLUTION INTRAVENOUS ONCE
Status: COMPLETED | OUTPATIENT
Start: 2024-02-13 | End: 2024-02-13

## 2024-02-13 RX ORDER — PREDNISONE 20 MG/1
20 TABLET ORAL DAILY
Qty: 3 TABLET | Refills: 0 | Status: SHIPPED | OUTPATIENT
Start: 2024-02-15 | End: 2024-02-18

## 2024-02-13 RX ADMIN — Medication 6 MG: at 08:02

## 2024-02-13 RX ADMIN — LORAZEPAM 2 MG: 1 TABLET ORAL at 09:02

## 2024-02-13 RX ADMIN — HEPARIN SODIUM 5000 UNITS: 5000 INJECTION INTRAVENOUS; SUBCUTANEOUS at 09:02

## 2024-02-13 RX ADMIN — LOSARTAN POTASSIUM AND HYDROCHLOROTHIAZIDE 1 TABLET: 100; 25 TABLET, FILM COATED ORAL at 05:02

## 2024-02-13 RX ADMIN — TRAZODONE HYDROCHLORIDE 200 MG: 100 TABLET ORAL at 08:02

## 2024-02-13 RX ADMIN — Medication 10 ML: at 05:02

## 2024-02-13 RX ADMIN — HYDROMORPHONE HYDROCHLORIDE 0.5 MG: 1 INJECTION, SOLUTION INTRAMUSCULAR; INTRAVENOUS; SUBCUTANEOUS at 04:02

## 2024-02-13 RX ADMIN — Medication 10 ML: at 11:02

## 2024-02-13 RX ADMIN — LABETALOL HYDROCHLORIDE 10 MG: 5 INJECTION, SOLUTION INTRAVENOUS at 08:02

## 2024-02-13 RX ADMIN — GABAPENTIN 300 MG: 300 CAPSULE ORAL at 09:02

## 2024-02-13 RX ADMIN — HYDROMORPHONE HYDROCHLORIDE 0.5 MG: 1 INJECTION, SOLUTION INTRAMUSCULAR; INTRAVENOUS; SUBCUTANEOUS at 05:02

## 2024-02-13 RX ADMIN — HEPARIN SODIUM 5000 UNITS: 5000 INJECTION INTRAVENOUS; SUBCUTANEOUS at 05:02

## 2024-02-13 RX ADMIN — THIAMINE HCL TAB 100 MG 100 MG: 100 TAB at 09:02

## 2024-02-13 RX ADMIN — CEFTRIAXONE 1 G: 1 INJECTION, POWDER, FOR SOLUTION INTRAMUSCULAR; INTRAVENOUS at 07:02

## 2024-02-13 RX ADMIN — PREDNISONE 20 MG: 20 TABLET ORAL at 09:02

## 2024-02-13 RX ADMIN — LORAZEPAM 2 MG: 1 TABLET ORAL at 05:02

## 2024-02-13 RX ADMIN — FOLIC ACID 1 MG: 1 TABLET ORAL at 09:02

## 2024-02-13 RX ADMIN — LABETALOL HYDROCHLORIDE 10 MG: 5 INJECTION, SOLUTION INTRAVENOUS at 12:02

## 2024-02-13 RX ADMIN — GABAPENTIN 300 MG: 300 CAPSULE ORAL at 05:02

## 2024-02-13 RX ADMIN — Medication 10 ML: at 12:02

## 2024-02-13 RX ADMIN — MAGNESIUM SULFATE HEPTAHYDRATE 2 G: 40 INJECTION, SOLUTION INTRAVENOUS at 10:02

## 2024-02-13 RX ADMIN — LEVOTHYROXINE SODIUM 75 MCG: 75 TABLET ORAL at 05:02

## 2024-02-13 RX ADMIN — ESCITALOPRAM OXALATE 10 MG: 10 TABLET ORAL at 09:02

## 2024-02-13 RX ADMIN — GABAPENTIN 300 MG: 300 CAPSULE ORAL at 08:02

## 2024-02-13 RX ADMIN — THERA TABS 1 TABLET: TAB at 09:02

## 2024-02-13 NOTE — PLAN OF CARE
Pt AAOx4, c/o of LUE pain, PRN Dilaudid given once. LUE dressing CDI. Ativan PRN was given once at the beginning of the shift for CIWA score of 9. BP elevated Labetalol IV 10mg PRN was given once with + outcome. Pt is able to ambulate to the bathroom with 1 assist. Call light within reach. POC on going.    Problem: Adult Inpatient Plan of Care  Goal: Plan of Care Review  Outcome: Ongoing, Progressing  Goal: Absence of Hospital-Acquired Illness or Injury  Outcome: Ongoing, Progressing  Goal: Optimal Comfort and Wellbeing  Outcome: Ongoing, Progressing  Goal: Readiness for Transition of Care  Outcome: Ongoing, Progressing     Problem: Diabetes Comorbidity  Goal: Blood Glucose Level Within Targeted Range  Outcome: Ongoing, Progressing     Problem: Fall Injury Risk  Goal: Absence of Fall and Fall-Related Injury  Outcome: Ongoing, Progressing     Problem: Skin Injury Risk Increased  Goal: Skin Health and Integrity  Outcome: Ongoing, Progressing

## 2024-02-13 NOTE — ASSESSMENT & PLAN NOTE
Not on any treatment for CLL. Seen by Dr. Montano in the past. WBC elevated on admission.     - daily CBC  - can consider heme-onc consult to potentially start therapy

## 2024-02-13 NOTE — PT/OT/SLP PROGRESS
Occupational Therapy   Treatment    Name: Earl Abdul  MRN: [id number]  Admitting Diagnosis:  Rhabdomyolysis       Recommendations:     Discharge Recommendations: Moderate Intensity Therapy  Discharge Equipment Recommendations:  shower chair, walker, rolling  Barriers to discharge:  Decreased caregiver support (patient requires increased level of assistance)    Assessment:     Earl Abdul is a 63 y.o. female with a medical diagnosis of Rhabdomyolysis.  She presents with the fallowing performance deficits affecting function are weakness, impaired endurance, impaired self care skills, impaired functional mobility, gait instability, decreased lower extremity function, decreased upper extremity function, decreased coordination, pain, orthopedic precautions, edema, impaired skin, decreased safety awareness, impaired balance, impaired cardiopulmonary response to activity, decreased ROM. Patient agreeable to participate with therapy session, patient continues to exhibit poor safety awareness, impulsivity, and reports of feeling dizzy placing patient at high risk for falls. Patient requires multiple verbal cues to perform functional transfers safely. Therapist trained patient on proper safety techniques during toileting transfer, performed PROM exercises to L UE as tolerated per patient. Patient educated on the importance to maintain arm supported and elevated to decrease edema and also to utilized right hand to assist with AAROM exercises to L UE to promote joint mobility and reduce stiffness. Patient currently demonstrates a need for Moderate intensity therapy on scheduled basis secondary to decline in  functional status due to recent hospitalization.     Rehab Prognosis:  Good; patient would benefit from acute skilled OT services to address these deficits and reach maximum level of function.       Plan:     Patient to be seen 4 x/week to address the above listed problems via self-care/home management,  therapeutic activities, therapeutic exercises, therapeutic groups  Plan of Care Expires: 03/13/24  Plan of Care Reviewed with: patient, spouse    Subjective     Chief Complaint: pain and dizziness   Patient/Family Comments/goals: to return to PLOF  Pain/Comfort:  Pain Rating 1:  (pain not rated)  Location - Side 1: Left  Location 1: arm  Pain Addressed 1: Reposition, Distraction, Nurse notified  Pain Rating Post-Intervention 1: 0/10    Objective:     Communicated with: Nurse prior to session.  Patient found up in chair with peripheral IV, telemetry, pulse ox (continuous), oxygen upon OT entry to room.  A client care conference was completed by the OTR and the DALY prior to treatment by the DALY to discuss the patient's POC and current status.   General Precautions: Standard, fall, diabetic, aspiration    Orthopedic Precautions:LUE weight bearing as tolerated (ROMAT)  Braces: UE Sling (Uses sling for comfort)  Respiratory Status: Nasal cannula, flow 2 L/min     Occupational Performance:      Functional Mobility/Transfers:  Patient completed Sit <> Stand Transfer from bedside chair  with contact guard assistance and minimum assistance  with  no assistive device and hand-held assist and verbal cues for proper hand placement   Patient completed Toilet Transfer Step Transfer technique with contact guard assistance and minimum assistance with  no AD and hand-held assist and verbal cues for proper sequencing of task.  Functional Mobility: Patient ambulated from bedside chair (8ft) with Min to CGA to manage IV pole and for safety due to slight instability and impulsiveness.    Activities of Daily Living:  Upper Body Dressing: minimum assistance to don/doff gown in standing   Toileting: maximal assistance for pericare hygiene and brief mgmt in standing/sitting      [facility] 6 Click ADL: 17    Treatment & Education:  YAMILETH educated and trained patient on AAROM/PROM exercises to L UE and AROM of fingers to promote joint mobility  and reduce stiffness   Educated patient on the importance to call for assistance prior to transfers to reduce the risk for falls.  Addressed patient's questions and concerns within DALY scope of practice.    Patient left up in chair with all lines intact, call button in reach, nurse notified, and spouse present    GOALS:   Multidisciplinary Problems       Occupational Therapy Goals          Problem: Occupational Therapy    Goal Priority Disciplines Outcome Interventions   Occupational Therapy Goal     OT, PT/OT Ongoing, Progressing    Description: Goals to be met by: 02-26-24     Patient will increase functional independence with ADLs by performing:    UE Dressing with Mod I  LE Dressing with Supervision.  Grooming while standing at sink with Supervision.  Toileting from toilet with Supervision for hygiene and clothing management.   Supine to sit with Supervision.  Stand pivot transfers with Supervision.  Toilet transfer to toilet with Supervision.  Pt. And family to Be Independent with HEP to RUE to improve ROM                       Time Tracking:     OT Date of Treatment: 02/13/24  OT Start Time: 1103  OT Stop Time: 1206  OT Total Time (min): 63 min    Billable Minutes:Self Care/Home Management 23  Therapeutic Activity 20  Therapeutic Exercise 20    OT/YAMILETH: YAMILETH     Number of YAMILETH visits since last OT visit: 1 2/13/2024

## 2024-02-13 NOTE — PROGRESS NOTES
[facility] - Telemetry [facility] Medicine  Progress Note    Patient Name: Earl Abdul  MRN: [id number]  Patient Class: IP- Inpatient   Admission Date: 2/10/2024  Length of Stay: 3 days  Attending Physician: Lloyd Whitley MD  Primary Care Provider: Andrew Rodriguez MD        Subjective:     Principal Problem:Rhabdomyolysis        HPI:  Ms. Earl Abdul is a 62 y/o F with hx of EtOH use disorder c/b severe withdrawals, HTN, HLD, R breast cancer s/p bilateral mastectomy previously on Letrozole, CLL not currently on any tx, IDDM2, depression who presented to the ED for evaluation of recurrent falls s/o EtOH intoxication. Patient recently admitted to [facility] 2/2-2/3 for management of EtOH withdrawal, however left AMA prior to completion of treatment. Since that time, patient reports continued heavy drinking (5th vodka each day since discharge). Patient states she drank heavily last night and passed out. She woke up today and subsequently fell and passed out again, reportedly hitting her head and her arm. Last drink 20 hours prior to admission. Reports that the drinking started after the loss of her son; has expressed interest in wanting to quit.     In the ED, patient afebrile, SBP 140s, tachy to 130s, tachynpeic, satting well on RA. UTox positive for alcohol. CBC showing leukocytosis to 15, hgb at baseline 10.6, plt 108. CMP with creatinine elevated to 2.3 from BL 1. CPK 25,000.      Overview/Hospital Course:  Admitted for traumatic rhabdo in the setting of heavy alcohol use, DEVANTE secondary to rhabdo, and c/o alcohol withdrawal. Transitioned from Ativan 2mg IV PRN for CIWA>8 to scheduled Ativan 2mg PO q8 taper for c/o alcohol withdrawal with hx of complicated EtOH withdrawal admissions. CK >25k. Given 3L of NS upon admission; added 2L more of IVF to assist with washout of CK and improve DEVANTE. Cr trending down with IVF. Ortho tapped left elbow. CK levels improved.    Interval History: SBP elevated  overnight, given PRN labetalol given. Will restart home losartan since DEVANTE has resolved. CK continues to downtrend. Medically ready for d/c, pending PT recs.     Review of Systems  Objective:     Vital Signs (Most Recent):  Temp: 98 °F (36.7 °C) (02/13/24 0738)  Pulse: 90 (02/13/24 1235)  Resp: 14 (02/13/24 0738)  BP: (!) 166/75 (02/13/24 0738)  SpO2: 99 % (02/13/24 1235) Vital Signs (24h Range):  Temp:  [98 °F (36.7 °C)-98.6 °F (37 °C)] 98 °F (36.7 °C)  Pulse:  [] 90  Resp:  [12-20] 14  SpO2:  [93 %-99 %] 99 %  BP: (155-200)/(71-90) 166/75     Weight: 86.2 kg (190 lb)  Body mass index is 30.67 kg/m².    Intake/Output Summary (Last 24 hours) at 2/13/2024 1320  Last data filed at 2/13/2024 0605  Gross per 24 hour   Intake 640 ml   Output --   Net 640 ml         Physical Exam  Vitals and nursing note reviewed.   Constitutional:       General: She is not in acute distress.     Appearance: She is obese.   HENT:      Head: Normocephalic and atraumatic.      Nose:      Comments: 2 L NC  Eyes:      General: No scleral icterus.     Extraocular Movements: Extraocular movements intact.      Conjunctiva/sclera: Conjunctivae normal.   Cardiovascular:      Rate and Rhythm: Normal rate and regular rhythm.      Pulses: Normal pulses.      Heart sounds: Normal heart sounds.   Pulmonary:      Effort: Pulmonary effort is normal.  No respiratory distress.    Abdominal:      General: Bowel sounds are normal. There is no distension.      Palpations: Abdomen is soft.      Tenderness: There is no abdominal tenderness. There is no guarding.   Musculoskeletal:         General: Normal range of motion.      Right lower leg: No edema.      Left lower leg: No edema.      Comments: Left arm bandaged  Neurological:      General: No focal deficit present.      Mental Status: She is alert. Mental status is at baseline.   Psychiatric:         Mood and Affect: Mood normal.         Behavior: Behavior normal.         Significant Labs: All  pertinent labs within the past 24 hours have been reviewed.    Significant Imaging: I have reviewed all pertinent imaging results/findings within the past 24 hours.    Assessment/Plan:      * Rhabdomyolysis  Encountered a fall 2/09.  found her on the floor, and called EMS. Presenting with muscle pain and weakness. CT head w/o acute intracranial pathology. CK >25K. UA with 3+ blood, 8 RBC.  2L NS bolus given in the ED.     - medically ready for d/c pending PT recs  - improving (71289>>6000)  - trend CK  - daily CMP  - monitor UOP  - strict I/O  - replenish lytes appropriately  - Ortho consulted by ED for evaluation of L arm for c/o compartment syndrome. XR of L arm w/o acute fractures or dislocation  - Recommended MRI of L forearm which showed effusion  - Perfomed aspiration and not c/w septic arthritis  - WBAT, ROMAT, sling for comfort, daily pt/ot   - IV dilaudid 0.5mg q6hr PRN for pain        Left forearm pain  See Rhabdomyolysis       CLL (chronic lymphocytic leukemia)  Not on any treatment for CLL. Seen by Dr. Montano in the past. WBC elevated on admission.     - daily CBC  - can consider heme-onc consult to potentially start therapy      Type 2 diabetes mellitus with hypoglycemia  Patient's FSGs are controlled on current medication regimen.  Last A1c reviewed-   Lab Results   Component Value Date    HGBA1C 6.0 (H) 02/11/2024     Most recent fingerstick glucose reviewed-   Recent Labs   Lab 02/12/24  2316 02/13/24  0506 02/13/24  1005 02/13/24  1227   POCTGLUCOSE 129* 84 141* 133*       Current correctional scale  Low  Maintain anti-hyperglycemic dose as follows-   Antihyperglycemics (From admission, onward)      Start     Stop Route Frequency Ordered    02/10/24 1821  insulin aspart U-100 pen 0-5 Units         -- SubQ Every 6 hours PRN 02/10/24 1721          Hold Oral hypoglycemics while patient is in the hospital.      - POCT glucose   - Diabetic diet   - BGL inpatient goal is  140-180    Hypothyroidism  - continue home synthroid      Malignant neoplasm of central portion of right breast in female, estrogen receptor positive  Last clinic visit 6/30/22  Letrozole started in February 2021.  Transitioned to anastrozole but patient not taking.      Thrombocytopenia  Patient was found to have thrombocytopenia, the likely etiology is secondary to alcohol use d/o but CLL is possible etiology as well, will monitor the platelets Daily. Will transfuse if platelet count is <50k (if undergoing surgical procedure or have active bleeding). Hold DVT prophylaxis if platelets are <30k due to hx of malignancy. The patient's platelet results have been reviewed and are listed below.  Recent Labs   Lab 02/13/24  0541   PLT 85*     HIT score 02/11 is zero    - daily CBC       Hyperlipidemia  Chronic. Statin prescribed but patient not taking.       DEVANTE (acute kidney injury)  RESOLVED  Patient with acute kidney injury/acute renal failure likely due to acute tubular necrosis caused by traumatic rhabdo  DEVANTE is currently worsening. Baseline creatinine  0.9  - Labs reviewed- Renal function/electrolytes with Estimated Creatinine Clearance: 77.6 mL/min (based on SCr of 0.8 mg/dL). according to latest data. Monitor urine output and serial BMP and adjust therapy as needed. Avoid nephrotoxins and renally dose meds for GFR listed above.    - daily CMP  - monitor UOP  - strict I/O  - replenish lytes appropriately        Sarcoidosis  Chronic. 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 Patient reports that the rash on her medical R ankle is similar to her previous erythema nodosum episode that required biopsy.       - pred 20mg daily for 7 days; unable to use first line therapy (NSAIDS) due to DEVANTE  - if persistent erythema, can consider rheum consult    Essential hypertension  Chronic, controlled. Latest blood pressure and vitals reviewed-     Temp:  [98 °F (36.7 °C)-98.6 °F (37 °C)]   Pulse:  []    Resp:  [12-20]   BP: (155-200)/(71-90)   SpO2:  [93 %-99 %] .   Home meds for hypertension were reviewed and noted below.   Hypertension Medications               losartan-hydrochlorothiazide 100-25 mg (HYZAAR) 100-25 mg per tablet Take 1 tablet by mouth once daily.            While in the hospital, will manage blood pressure as follows; Adjust home antihypertensive regimen as follows- hold home meds in the setting of DEVANTE secondary to rhabdo    Will utilize p.r.n. blood pressure medication only if patient's blood pressure greater than 180/110 and she develops symptoms such as worsening chest pain or shortness of breath.      - given clonidine 0.1mg once to control BP  - resumed home losartan-HCTZ as DEVANTE has resolved     Alcohol withdrawal  Treatment with 1 mg Ativan followed by phenobarbital initiated in ED.      - CT abdomen 02/10 with signs of nodular liver; pending US of liver with doppler for further eval --> US findings consistent with cirrhosis morphology.  Alcohol Withdrawal precautions:  - Vitals q4h while awake  - CIWA monitoring  - Lorazepam (not on diazepam due to concerns of nodular liver) 2mg PO q8  - Lorazepam 2mg IV q2hr PRN for CIWA>8  - Vitamin supplementation- Thiamine, Folic acid, Vit. B12, and Multivitamin qd  - seizure precautions  - fall precautions  - aspirations precautions  - addiction psych consulted    Alcohol use disorder, severe, dependence  See Alcohol withdrawal         VTE Risk Mitigation (From admission, onward)           Ordered     heparin (porcine) injection 5,000 Units  Every 8 hours         02/10/24 1707     IP VTE HIGH RISK PATIENT  Once         02/10/24 1707     Place sequential compression device  Until discontinued         02/10/24 1707                    Discharge Planning   BECCA: 2/14/2024     Code Status: Full Code   Is the patient medically ready for discharge?:     Reason for patient still in hospital (select all that apply): PT / OT recommendations and Pending  disposition  Discharge Plan A: Home, Home with family                  Shannan High MD  Department of Hospital Medicine   [facility] - Telemetry Stepdown

## 2024-02-13 NOTE — ASSESSMENT & PLAN NOTE
RESOLVED  Patient with acute kidney injury/acute renal failure likely due to acute tubular necrosis caused by traumatic rhabdo  DEVANTE is currently worsening. Baseline creatinine  0.9  - Labs reviewed- Renal function/electrolytes with Estimated Creatinine Clearance: 77.6 mL/min (based on SCr of 0.8 mg/dL). according to latest data. Monitor urine output and serial BMP and adjust therapy as needed. Avoid nephrotoxins and renally dose meds for GFR listed above.    - daily CMP  - monitor UOP  - strict I/O  - replenish lytes appropriately

## 2024-02-13 NOTE — ASSESSMENT & PLAN NOTE
Patient was found to have thrombocytopenia, the likely etiology is secondary to alcohol use d/o but CLL is possible etiology as well, will monitor the platelets Daily. Will transfuse if platelet count is <50k (if undergoing surgical procedure or have active bleeding). Hold DVT prophylaxis if platelets are <30k due to hx of malignancy. The patient's platelet results have been reviewed and are listed below.  Recent Labs   Lab 02/13/24  0541   PLT 85*     HIT score 02/11 is zero    - daily CBC

## 2024-02-13 NOTE — ASSESSMENT & PLAN NOTE
Chronic. 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 Patient reports that the rash on her medical R ankle is similar to her previous erythema nodosum episode that required biopsy.       - pred 20mg daily for 7 days; unable to use first line therapy (NSAIDS) due to DEVANTE  - if persistent erythema, can consider rheum consult

## 2024-02-13 NOTE — ASSESSMENT & PLAN NOTE
Treatment with 1 mg Ativan followed by phenobarbital initiated in ED.      - CT abdomen 02/10 with signs of nodular liver; pending US of liver with doppler for further eval --> US findings consistent with cirrhosis morphology.  Alcohol Withdrawal precautions:  - Vitals q4h while awake  - CIWA monitoring  - Lorazepam (not on diazepam due to concerns of nodular liver) 2mg PO q8  - Lorazepam 2mg IV q2hr PRN for CIWA>8  - Vitamin supplementation- Thiamine, Folic acid, Vit. B12, and Multivitamin qd  - seizure precautions  - fall precautions  - aspirations precautions  - addiction psych consulted

## 2024-02-13 NOTE — SUBJECTIVE & OBJECTIVE
Interval History: SBP elevated overnight, given PRN labetalol given. Will restart home losartan since DEVANTE has resolved. CK continues to downtrend. Medically ready for d/c, pending PT recs.     Review of Systems  Objective:     Vital Signs (Most Recent):  Temp: 98 °F (36.7 °C) (02/13/24 0738)  Pulse: 90 (02/13/24 1235)  Resp: 14 (02/13/24 0738)  BP: (!) 166/75 (02/13/24 0738)  SpO2: 99 % (02/13/24 1235) Vital Signs (24h Range):  Temp:  [98 °F (36.7 °C)-98.6 °F (37 °C)] 98 °F (36.7 °C)  Pulse:  [] 90  Resp:  [12-20] 14  SpO2:  [93 %-99 %] 99 %  BP: (155-200)/(71-90) 166/75     Weight: 86.2 kg (190 lb)  Body mass index is 30.67 kg/m².    Intake/Output Summary (Last 24 hours) at 2/13/2024 1320  Last data filed at 2/13/2024 0605  Gross per 24 hour   Intake 640 ml   Output --   Net 640 ml         Physical Exam  Vitals and nursing note reviewed.   Constitutional:       General: She is not in acute distress.     Appearance: She is obese.   HENT:      Head: Normocephalic and atraumatic.      Nose:      Comments: 2 L NC  Eyes:      General: No scleral icterus.     Extraocular Movements: Extraocular movements intact.      Conjunctiva/sclera: Conjunctivae normal.   Cardiovascular:      Rate and Rhythm: Normal rate and regular rhythm.      Pulses: Normal pulses.      Heart sounds: Normal heart sounds.   Pulmonary:      Effort: Pulmonary effort is normal.  No respiratory distress.    Abdominal:      General: Bowel sounds are normal. There is no distension.      Palpations: Abdomen is soft.      Tenderness: There is no abdominal tenderness. There is no guarding.   Musculoskeletal:         General: Normal range of motion.      Right lower leg: No edema.      Left lower leg: No edema.      Comments: Left arm bandaged  Neurological:      General: No focal deficit present.      Mental Status: She is alert. Mental status is at baseline.   Psychiatric:         Mood and Affect: Mood normal.         Behavior: Behavior normal.          Significant Labs: All pertinent labs within the past 24 hours have been reviewed.    Significant Imaging: I have reviewed all pertinent imaging results/findings within the past 24 hours.

## 2024-02-13 NOTE — ASSESSMENT & PLAN NOTE
Encountered a fall 2/09.  found her on the floor, and called EMS. Presenting with muscle pain and weakness. CT head w/o acute intracranial pathology. CK >25K. UA with 3+ blood, 8 RBC.  2L NS bolus given in the ED.     - medically ready for d/c pending PT recs  - improving (02848>>6000)  - trend CK  - daily CMP  - monitor UOP  - strict I/O  - replenish lytes appropriately  - Ortho consulted by ED for evaluation of L arm for c/o compartment syndrome. XR of L arm w/o acute fractures or dislocation  - Recommended MRI of L forearm which showed effusion  - Perfomed aspiration and not c/w septic arthritis  - WBAT, ROMAT, sling for comfort, daily pt/ot   - IV dilaudid 0.5mg q6hr PRN for pain

## 2024-02-13 NOTE — ASSESSMENT & PLAN NOTE
Patient's FSGs are controlled on current medication regimen.  Last A1c reviewed-   Lab Results   Component Value Date    HGBA1C 6.0 (H) 02/11/2024     Most recent fingerstick glucose reviewed-   Recent Labs   Lab 02/12/24  2316 02/13/24  0506 02/13/24  1005 02/13/24  1227   POCTGLUCOSE 129* 84 141* 133*       Current correctional scale  Low  Maintain anti-hyperglycemic dose as follows-   Antihyperglycemics (From admission, onward)      Start     Stop Route Frequency Ordered    02/10/24 1821  insulin aspart U-100 pen 0-5 Units         -- SubQ Every 6 hours PRN 02/10/24 1721          Hold Oral hypoglycemics while patient is in the hospital.      - POCT glucose   - Diabetic diet   - BGL inpatient goal is 140-180

## 2024-02-14 VITALS
SYSTOLIC BLOOD PRESSURE: 150 MMHG | TEMPERATURE: 98 F | RESPIRATION RATE: 18 BRPM | HEART RATE: 89 BPM | WEIGHT: 190 LBS | HEIGHT: 66 IN | DIASTOLIC BLOOD PRESSURE: 70 MMHG | OXYGEN SATURATION: 95 % | BODY MASS INDEX: 30.53 KG/M2

## 2024-02-14 LAB
ALBUMIN SERPL BCP-MCNC: 2.9 G/DL (ref 3.5–5.2)
ALP SERPL-CCNC: 51 U/L (ref 55–135)
ALT SERPL W/O P-5'-P-CCNC: 128 U/L (ref 10–44)
ANION GAP SERPL CALC-SCNC: 10 MMOL/L (ref 8–16)
AST SERPL-CCNC: 136 U/L (ref 10–40)
BASOPHILS # BLD AUTO: 0.01 K/UL (ref 0–0.2)
BASOPHILS NFR BLD: 0.2 % (ref 0–1.9)
BILIRUB SERPL-MCNC: 0.2 MG/DL (ref 0.1–1)
BUN SERPL-MCNC: 5 MG/DL (ref 8–23)
CALCIUM SERPL-MCNC: 8.4 MG/DL (ref 8.7–10.5)
CHLORIDE SERPL-SCNC: 103 MMOL/L (ref 95–110)
CK SERPL-CCNC: 3126 U/L (ref 20–180)
CO2 SERPL-SCNC: 24 MMOL/L (ref 23–29)
CREAT SERPL-MCNC: 0.8 MG/DL (ref 0.5–1.4)
DIFFERENTIAL METHOD BLD: ABNORMAL
EOSINOPHIL # BLD AUTO: 0.1 K/UL (ref 0–0.5)
EOSINOPHIL NFR BLD: 1.2 % (ref 0–8)
ERYTHROCYTE [DISTWIDTH] IN BLOOD BY AUTOMATED COUNT: 17.1 % (ref 11.5–14.5)
EST. GFR  (NO RACE VARIABLE): >60 ML/MIN/1.73 M^2
GLUCOSE SERPL-MCNC: 209 MG/DL (ref 70–110)
HCT VFR BLD AUTO: 27.6 % (ref 37–48.5)
HGB BLD-MCNC: 8.1 G/DL (ref 12–16)
IMM GRANULOCYTES # BLD AUTO: 0.02 K/UL (ref 0–0.04)
IMM GRANULOCYTES NFR BLD AUTO: 0.4 % (ref 0–0.5)
LYMPHOCYTES # BLD AUTO: 2.5 K/UL (ref 1–4.8)
LYMPHOCYTES NFR BLD: 52.2 % (ref 18–48)
MAGNESIUM SERPL-MCNC: 1.4 MG/DL (ref 1.6–2.6)
MCH RBC QN AUTO: 25.6 PG (ref 27–31)
MCHC RBC AUTO-ENTMCNC: 29.3 G/DL (ref 32–36)
MCV RBC AUTO: 87 FL (ref 82–98)
MONOCYTES # BLD AUTO: 0.5 K/UL (ref 0.3–1)
MONOCYTES NFR BLD: 9.2 % (ref 4–15)
NEUTROPHILS # BLD AUTO: 1.8 K/UL (ref 1.8–7.7)
NEUTROPHILS NFR BLD: 36.8 % (ref 38–73)
NRBC BLD-RTO: 0 /100 WBC
PHOSPHATE SERPL-MCNC: 3.6 MG/DL (ref 2.7–4.5)
PLATELET # BLD AUTO: 86 K/UL (ref 150–450)
PMV BLD AUTO: 10.5 FL (ref 9.2–12.9)
POCT GLUCOSE: 100 MG/DL (ref 70–110)
POCT GLUCOSE: 220 MG/DL (ref 70–110)
POTASSIUM SERPL-SCNC: 3.4 MMOL/L (ref 3.5–5.1)
PROT SERPL-MCNC: 5.6 G/DL (ref 6–8.4)
RBC # BLD AUTO: 3.16 M/UL (ref 4–5.4)
SODIUM SERPL-SCNC: 137 MMOL/L (ref 136–145)
WBC # BLD AUTO: 4.87 K/UL (ref 3.9–12.7)

## 2024-02-14 PROCEDURE — 63600175 PHARM REV CODE 636 W HCPCS

## 2024-02-14 PROCEDURE — 85025 COMPLETE CBC W/AUTO DIFF WBC: CPT

## 2024-02-14 PROCEDURE — 97116 GAIT TRAINING THERAPY: CPT

## 2024-02-14 PROCEDURE — 80053 COMPREHEN METABOLIC PANEL: CPT

## 2024-02-14 PROCEDURE — 25000003 PHARM REV CODE 250

## 2024-02-14 PROCEDURE — 25000003 PHARM REV CODE 250: Performed by: STUDENT IN AN ORGANIZED HEALTH CARE EDUCATION/TRAINING PROGRAM

## 2024-02-14 PROCEDURE — 97161 PT EVAL LOW COMPLEX 20 MIN: CPT

## 2024-02-14 PROCEDURE — 83735 ASSAY OF MAGNESIUM: CPT

## 2024-02-14 PROCEDURE — 36415 COLL VENOUS BLD VENIPUNCTURE: CPT

## 2024-02-14 PROCEDURE — A4216 STERILE WATER/SALINE, 10 ML: HCPCS | Performed by: STUDENT IN AN ORGANIZED HEALTH CARE EDUCATION/TRAINING PROGRAM

## 2024-02-14 PROCEDURE — 82550 ASSAY OF CK (CPK): CPT

## 2024-02-14 PROCEDURE — 84100 ASSAY OF PHOSPHORUS: CPT

## 2024-02-14 RX ORDER — FLUOXETINE 10 MG/1
10 CAPSULE ORAL DAILY
Qty: 30 CAPSULE | Refills: 11 | Status: ON HOLD | OUTPATIENT
Start: 2024-02-14 | End: 2024-03-06 | Stop reason: HOSPADM

## 2024-02-14 RX ORDER — LANOLIN ALCOHOL/MO/W.PET/CERES
100 CREAM (GRAM) TOPICAL DAILY
Qty: 30 TABLET | Refills: 0 | Status: ON HOLD | OUTPATIENT
Start: 2024-02-15 | End: 2024-03-06

## 2024-02-14 RX ORDER — GABAPENTIN 300 MG/1
300 CAPSULE ORAL 3 TIMES DAILY
Qty: 90 CAPSULE | Refills: 0 | Status: ON HOLD | OUTPATIENT
Start: 2024-02-14 | End: 2024-03-06

## 2024-02-14 RX ORDER — MAGNESIUM SULFATE HEPTAHYDRATE 40 MG/ML
2 INJECTION, SOLUTION INTRAVENOUS ONCE
Status: COMPLETED | OUTPATIENT
Start: 2024-02-14 | End: 2024-02-14

## 2024-02-14 RX ORDER — POTASSIUM CHLORIDE 20 MEQ/1
40 TABLET, EXTENDED RELEASE ORAL ONCE
Status: DISCONTINUED | OUTPATIENT
Start: 2024-02-14 | End: 2024-02-14

## 2024-02-14 RX ADMIN — PREDNISONE 20 MG: 20 TABLET ORAL at 09:02

## 2024-02-14 RX ADMIN — INSULIN ASPART 2 UNITS: 100 INJECTION, SOLUTION INTRAVENOUS; SUBCUTANEOUS at 05:02

## 2024-02-14 RX ADMIN — HEPARIN SODIUM 5000 UNITS: 5000 INJECTION INTRAVENOUS; SUBCUTANEOUS at 05:02

## 2024-02-14 RX ADMIN — THERA TABS 1 TABLET: TAB at 09:02

## 2024-02-14 RX ADMIN — Medication 10 ML: at 05:02

## 2024-02-14 RX ADMIN — THIAMINE HCL TAB 100 MG 100 MG: 100 TAB at 09:02

## 2024-02-14 RX ADMIN — FOLIC ACID 1 MG: 1 TABLET ORAL at 09:02

## 2024-02-14 RX ADMIN — MAGNESIUM SULFATE HEPTAHYDRATE 2 G: 40 INJECTION, SOLUTION INTRAVENOUS at 09:02

## 2024-02-14 RX ADMIN — LEVOTHYROXINE SODIUM 75 MCG: 75 TABLET ORAL at 05:02

## 2024-02-14 RX ADMIN — LORAZEPAM 2 MG: 1 TABLET ORAL at 05:02

## 2024-02-14 RX ADMIN — LOSARTAN POTASSIUM AND HYDROCHLOROTHIAZIDE 1 TABLET: 100; 25 TABLET, FILM COATED ORAL at 09:02

## 2024-02-14 RX ADMIN — GABAPENTIN 300 MG: 300 CAPSULE ORAL at 09:02

## 2024-02-14 RX ADMIN — ESCITALOPRAM OXALATE 10 MG: 10 TABLET ORAL at 09:02

## 2024-02-14 NOTE — PLAN OF CARE
[facility] - Telemetry Stepdown  Discharge Final Note    Primary Care Provider: Andrew Rodriguez MD    Expected Discharge Date: 2/14/2024    Future Appointments   Date Time Provider Department Center   2/22/2024  3:00 PM Andrew Rodriguez MD [facility]   5/16/2024  9:00 AM [facility] [place] [facility]   5/16/2024 10:00 AM Miguel Montano MD [facility] HC BMT [place] [facility]         Final Discharge Note (most recent)       Final Note - 02/14/24 1545          Final Note    Assessment Type Final Discharge Note     Anticipated Discharge Disposition Home-Health Care [facility]     What phone number can be called within the next 1-3 days to see how you are doing after discharge? [phone]   Pt d/c home w/ [facility] Resources/Appts/Education Provided Post-Acute resouces added to AVS        Post-Acute Status    Post-Acute Authorization Home Health     Home Health Status Set-up Complete/Auth obtained     Coverage [facility] CHOICE PLUS                     Important Message from Medicare             Contact Info       [place], [facility] -   Specialty: Home Health Services    [address]  [address]  [place] [address]   Phone: [phone]       Next Steps: Follow up          LEIDY Edward   Case Management Dept-[facility]  [phone]

## 2024-02-14 NOTE — PLAN OF CARE
Patient AAOx4. VSS. PRN Dilaudid given once for arm pain. CIWA score of 7. Patient ambulates to   to the bathroom with assist. Instructed to call for assistance to the bathroom.   Call light within reach. Care ongoing.      Problem: Violence Risk or Actual  Goal: Anger and Impulse Control  Outcome: Ongoing, Progressing     Problem: Adult Inpatient Plan of Care  Goal: Plan of Care Review  Outcome: Ongoing, Progressing  Goal: Patient-Specific Goal (Individualized)  Outcome: Ongoing, Progressing  Goal: Absence of Hospital-Acquired Illness or Injury  Outcome: Ongoing, Progressing  Goal: Optimal Comfort and Wellbeing  Outcome: Ongoing, Progressing  Goal: Readiness for Transition of Care  Outcome: Ongoing, Progressing     Problem: Diabetes Comorbidity  Goal: Blood Glucose Level Within Targeted Range  Outcome: Ongoing, Progressing     Problem: Fluid and Electrolyte Imbalance (Acute Kidney Injury/Impairment)  Goal: Fluid and Electrolyte Balance  Outcome: Ongoing, Progressing     Problem: Oral Intake Inadequate (Acute Kidney Injury/Impairment)  Goal: Optimal Nutrition Intake  Outcome: Ongoing, Progressing     Problem: Renal Function Impairment (Acute Kidney Injury/Impairment)  Goal: Effective Renal Function  Outcome: Ongoing, Progressing     Problem: Impaired Wound Healing  Goal: Optimal Wound Healing  Outcome: Ongoing, Progressing     Problem: Fall Injury Risk  Goal: Absence of Fall and Fall-Related Injury  Outcome: Ongoing, Progressing     Problem: Skin Injury Risk Increased  Goal: Skin Health and Integrity  Outcome: Ongoing, Progressing     Problem: Infection  Goal: Absence of Infection Signs and Symptoms  Outcome: Ongoing, Progressing

## 2024-02-14 NOTE — PT/OT/SLP EVAL
Physical Therapy Evaluation and Discharge Note    Patient Name:  Earl Abdul   MRN:  [id number]    Recommendations:     Discharge Recommendations: Low Intensity Therapy  Discharge Equipment Recommendations: none   Barriers to discharge: None    Assessment:     Earl Abdul is a 63 y.o. female admitted with a medical diagnosis of Rhabdomyolysis. .  At this time, patient is functioning at their prior level of function and does not require further acute PT services.     Recent Surgery: * No surgery found *      Plan:     During this hospitalization, patient does not require further acute PT services.  Please re-consult if situation changes.      Subjective     Chief Complaint: (L) UE discomfort  Patient/Family Comments/goals: pt. Agreeable to PT  Pain/Comfort:  Pain Rating 1: 7/10  Location - Side 1: Left  Location 1: arm  Pain Addressed 1: Reposition, Distraction  Pain Rating Post-Intervention 1:  (pt. did not rate)    Patients cultural, spiritual, Sikhism conflicts given the current situation: no    Living Environment:  Pt. Lives with spouse in in 2-story home with no JANESSA. Pt. Has bedroom/bathroom on ground floor.  Prior to admission, patients level of function was indep.  Equipment used at home: cane, straight.  Upon discharge, patient will have assistance from spouse.    Objective:     Communicated with nursing prior to session.  Patient found supine with peripheral IV, telemetry upon PT entry to room.    General Precautions: Standard, fall    Orthopedic Precautions:LUE weight bearing as tolerated   Braces: UE Sling (for comfort)  Respiratory Status: Room air    Exams:  RLE ROM: WFL  RLE Strength: WFL  LLE ROM: WFL  LLE Strength: WFL    Functional Mobility:  Bed Mobility:     Rolling Right: stand by assistance  Scooting: stand by assistance  Supine to Sit: stand by assistance  Transfers:     Sit to Stand:  stand by assistance with no AD  Gait: ~200' with SBA without AD or significant LOB. Pt. Slightly  unsteady at times, but able to self correct.  Balance: fair    AM-PAC 6 CLICK MOBILITY  Total Score:20       Treatment and Education:  Discussed therapy needs, goals, safety with mobility, and POC    AM-PAC 6 CLICK MOBILITY  Total Score:20     Patient left up in chair with all lines intact and call button in reach.    GOALS:   Multidisciplinary Problems       Physical Therapy Goals       Not on file                    History:     Past Medical History:   Diagnosis Date    Alcoholism     c/b alcohol withdrawl seizures 7/2017    Anemia     Cancer of breast 10/2020    s/p bilateral mastectomy for  T1b N0 stage IA breast cancer October 2020    CLL (chronic lymphocytic leukemia) 09/30/2022 6/22/22 - PB flow cytometry  Immunophenotyping of peripheral blood detects a distinct kappa light chain restricted monoclonal B-cell population  (calculated at 2.44x10 9 /L, from the most recent CBC showing a total WBC of  7.35 K/uL with 61% total lymphocytes)  with a CLL phenotype (coexpression of CD19, CD5, CD23 and dim CD20). CD22 (FITC), FMC-7 and CD38 are negative in this population.    Controlled type 2 diabetes mellitus without complication, without long-term current use of insulin 11/30/2021    COPD (chronic obstructive pulmonary disease)     Depression     Diverticulitis     Fatty liver     GERD (gastroesophageal reflux disease)     Hyperlipidemia     Hypertension     Pancreatitis     Peptic ulcer disease     Polysubstance abuse     Posterior reversible encephalopathy syndrome     Sarcoidosis of lung     over 30 yrs ago    Suicide attempt        Past Surgical History:   Procedure Laterality Date    APPENDECTOMY      BILATERAL MASTECTOMY Bilateral 10/29/2020    Procedure: MASTECTOMY, BILATERAL;  Surgeon: Baylee Kevin MD;  Location: [facility] OR [address] [place] [address];  Service: General;  Laterality: Bilateral;    BREAST REVISION SURGERY Bilateral 2/11/2021    Procedure: BREAST REVISION SURGERY;  Surgeon: Scottie Johnson MD;  Location:  [facility] OR 2ND FLR;  Service: Plastics;  Laterality: Bilateral;    COLONOSCOPY N/A 7/28/2017    Procedure: COLONOSCOPY;  Surgeon: Aaron Alvarado MD;  Location: [facility];  Service: Endoscopy;  Laterality: N/A;    ESOPHAGOGASTRODUODENOSCOPY  10/7/2016, 11/6/2014    2016 - gastritis, duodenitis, 2014 erosive gastritis    ESOPHAGOGASTRODUODENOSCOPY N/A 2/11/2020    Procedure: ESOPHAGOGASTRODUODENOSCOPY (EGD);  Surgeon: Fawn Garrido MD;  Location: [facility];  Service: Endoscopy;  Laterality: N/A;    ESOPHAGOGASTRODUODENOSCOPY N/A 4/19/2021    Procedure: EGD (ESOPHAGOGASTRODUODENOSCOPY);  Surgeon: Paramjit Martino MD;  Location: [facility];  Service: Endoscopy;  Laterality: N/A;    FLEXIBLE SIGMOIDOSCOPY  11/06/2014    colitis    HYSTERECTOMY      IMPLANTATION OF PERMANENT SACRAL NERVE STIMULATOR N/A 7/12/2022    Procedure: INSERTION, NEUROSTIMULATOR, PERMANENT, SACRAL;  Surgeon: Juaquin Edwards MD;  [address];  Service: Urology;  Laterality: N/A;  1hr    INJECTION FOR SENTINEL NODE IDENTIFICATION Right 10/29/2020    Procedure: INJECTION, FOR SENTINEL NODE IDENTIFICATION;  Surgeon: Baylee Kevin MD;  Location: [address];  Service: General;  Laterality: Right;    INJECTION OF JOINT Right 10/10/2019    Procedure: Injection, Joint RIGHT ILIOPSOAS BURSA/TENDON INJECTION AND RIGHT GLUTEAL TENDON INJECTION WITH STEROID AND LIDOCAINE;  Surgeon: Guillaume Rico MD;  Location: [facility] PAIN MGT;  Service: Pain Management;  Laterality: Right;  NEEDS CONSENT    INSERTION OF BREAST TISSUE EXPANDER Bilateral 10/29/2020    Procedure: INSERTION, TISSUE EXPANDER, BREAST;  Surgeon: Scottie Johnson MD;  [facility];  Service: Plastics;  Laterality: Bilateral;  Right breast: 1082 g  Left breast: 1076 g    LIPOSUCTION Bilateral 2/11/2021    Procedure: LIPOSUCTION;  Surgeon: Scottie Johnson MD;  [facility];  Service: Plastics;  Laterality: Bilateral;    mediastenoscopy       REPLACEMENT OF IMPLANT OF BREAST Bilateral 2/11/2021    Procedure: REPLACEMENT, IMPLANT, BREAST;  Surgeon: Scottie Johnson MD;  Location: [facility] OR [address] [place] [address];  Service: Plastics;  Laterality: Bilateral;    SENTINEL LYMPH NODE BIOPSY Right 10/29/2020    Procedure: BIOPSY, LYMPH NODE, SENTINEL;  Surgeon: Baylee Kevin MD;  Location: [facility] OR [address] [place] [address];  Service: General;  Laterality: Right;    TONSILLECTOMY N/A 1970    TUBAL LIGATION         Time Tracking:     PT Received On: 02/14/24  PT Start Time: 1042     PT Stop Time: 1104  PT Total Time (min): 22 min     Billable Minutes: Evaluation 10 and Gait Training 12      02/14/2024

## 2024-02-14 NOTE — DISCHARGE SUMMARY
[facility] - Telemetry [facility] Medicine  Discharge Summary      Patient Name: Earl Abdul  MRN: [id number]  IZA: [id number]  Patient Class: IP- Inpatient  Admission Date: 2/10/2024  Hospital Length of Stay: 4 days  Discharge Date and Time:  02/14/2024 11:32 AM  Attending Physician: Lloyd Whitley MD   Discharging Provider: Shannan High MD  Primary Care Provider: Andrew Rodriguez MD  Hospital Medicine Team: [facility] 5 Shannan High MD  Primary Care Team: [facility] 5    HPI:   Ms. Earl Abdul is a 62 y/o F with hx of EtOH use disorder c/b severe withdrawals, HTN, HLD, R breast cancer s/p bilateral mastectomy previously on Letrozole, CLL not currently on any tx, IDDM2, depression who presented to the ED for evaluation of recurrent falls s/o EtOH intoxication. Patient recently admitted to [facility] 2/2-2/3 for management of EtOH withdrawal, however left AMA prior to completion of treatment. Since that time, patient reports continued heavy drinking (5th vodka each day since discharge). Patient states she drank heavily last night and passed out. She woke up today and subsequently fell and passed out again, reportedly hitting her head and her arm. Last drink 20 hours prior to admission. Reports that the drinking started after the loss of her son; has expressed interest in wanting to quit.     In the ED, patient afebrile, SBP 140s, tachy to 130s, tachynpeic, satting well on RA. UTox positive for alcohol. CBC showing leukocytosis to 15, hgb at baseline 10.6, plt 108. CMP with creatinine elevated to 2.3 from BL 1. CPK 25,000.      * No surgery found *      Hospital Course:   Admitted for traumatic rhabdo in the setting of heavy alcohol use, DEVANTE secondary to rhabdo, and c/o alcohol withdrawal. Transitioned from Ativan 2mg IV PRN for CIWA>8 to scheduled Ativan 2mg PO q8 taper for c/o alcohol withdrawal with hx of complicated EtOH withdrawal admissions. CK >25k. Given 3L of NS upon admission; added 2L  more of IVF to assist with washout of CK. Ortho tapped left elbow but not concerning for septic arthritis. Can use arm as tolerated per ortho. DEVANTE since resolved and CK continuing to improve daily. Seen by PT/OT and will d/c home w/ HH. Medically ready for d/c. Medications reconciled and outpt psych referral in place. PCP f/u scheduled for 2/22/24.    Goals of Care Treatment Preferences:  Code Status: Full Code    Health care agent: Spouse is NOK  Health care agent number: No value filed.          What is most important right now is to focus on curative/life-prolongation (regardless of treatment burdens).  Accordingly, we have decided that the best plan to meet the patient's goals includes continuing with treatment.      Consults:   Consults (From admission, onward)          Status Ordering Provider     [facility]  [facility]        Provider:  (Not yet assigned)    Completed JONATHAN KING     Inpatient consult to Psychiatry  Once        Provider:  (Not yet assigned)    Completed THANH BECERRIL     Inpatient consult to Orthopedic Surgery  Once        Provider:  (Not yet assigned)    Completed RAFA JUAREZ     Inpatient consult to Critical Care Medicine  Once        Provider:  (Not yet assigned)    Completed RAFA JUAREZ            No new Assessment & Plan notes have been filed under this hospital service since the last note was generated.  Service: Hospital Medicine    Final Active Diagnoses:    Diagnosis Date Noted POA    PRINCIPAL PROBLEM:  Rhabdomyolysis [M62.82] 02/10/2024 Yes    Left forearm pain [M79.632] 02/10/2024 Yes    Depression [F32.A] 04/26/2023 Yes    CLL (chronic lymphocytic leukemia) [C91.10] 09/30/2022 Yes     Chronic    Hypothyroidism [E03.9] 11/30/2021 Yes    Type 2 diabetes mellitus with hypoglycemia [E11.649] 11/30/2021 Yes    Malignant neoplasm of central portion of right breast in female, estrogen receptor positive [C50.111, Z17.0] 11/18/2020 Not Applicable      Chronic    Thrombocytopenia [D69.6] 10/26/2019 Yes    Depression with anxiety [F41.8] 08/16/2017 Yes    DEVANTE (acute kidney injury) [N17.9] 10/14/2014 Yes    Alcohol use disorder, severe, dependence [F10.20] 02/07/2014 Yes    Alcohol withdrawal [F10.939] 02/07/2014 Yes    Essential hypertension [I10] 02/07/2014 Yes     Chronic    Hyperlipidemia [E78.5] 11/30/2012 Yes     Chronic    Sarcoidosis [D86.9] 03/31/2011 Yes      Problems Resolved During this Admission:    Diagnosis Date Noted Date Resolved POA    GERD (gastroesophageal reflux disease) [K21.9]  02/10/2024 Yes     Chronic       Discharged Condition: stable    Disposition: Home or Self Care    Follow Up:    Patient Instructions:      Ambulatory referral/consult to Psychology   Standing Status: Future   Referral Priority: Routine Referral Type: Psychiatric   Referral Reason: Specialty Services Required   Requested Specialty: Psychology   Number of Visits Requested: 1       Significant Diagnostic Studies: N/A    Pending Diagnostic Studies:       None           Medications:  Reconciled Home Medications:      Medication List        START taking these medications      FLUoxetine 10 MG capsule  Take 1 capsule (10 mg total) by mouth once daily.     gabapentin 300 MG capsule  Commonly known as: NEURONTIN  Take 1 capsule (300 mg total) by mouth 3 (three) times daily.     predniSONE 20 MG tablet  Commonly known as: DELTASONE  Take 1 tablet (20 mg total) by mouth once daily. for 3 days  Start taking on: February 15, 2024     thiamine 100 MG tablet  Take 1 tablet (100 mg total) by mouth once daily.  Start taking on: February 15, 2024            CONTINUE taking these medications      ACCU-CHEK GUIDE ME GLUCOSE MTR Misc  Generic drug: blood-glucose meter  Use to check blood glucose 4 times daily     ACCU-CHEK GUIDE TEST STRIPS Strp  Generic drug: blood sugar diagnostic  Use to check blood glucose 4 times daily     ACCU-CHEK SOFTCLIX LANCETS Misc  Generic drug:  "lancets  Use to check blood glucose 4 times daily     baclofen 5 mg Tab tablet  Commonly known as: LIORESAL  Take 1 tablet (5 mg total) by mouth 3 (three) times daily.     BD ULTRA-FINE NATHANAEL PEN NEEDLE 32 gauge x 5/32" Ndle  Generic drug: pen needle, diabetic  Use to inject insulin 5 (five) times daily.     folic acid 1 MG tablet  Commonly known as: FOLVITE  Take 1 tablet (1 mg total) by mouth once daily.     HumaLOG KwikPen Insulin 100 unit/mL pen  Generic drug: insulin lispro  Inject 9 Units into the skin 3 (three) times daily with meals.     LANTUS SOLOSTAR U-100 INSULIN glargine 100 units/mL SubQ pen  Generic drug: insulin  Inject 15 Units into the skin 2 (two) times a day.     levothyroxine 75 MCG tablet  Commonly known as: SYNTHROID  Take 1 tablet (75 mcg total) by mouth before breakfast.     loperamide 2 mg capsule  Commonly known as: IMODIUM  Take 1 capsule (2 mg total) by mouth 4 (four) times daily as needed for Diarrhea.     losartan-hydrochlorothiazide 100-25 mg 100-25 mg per tablet  Commonly known as: HYZAAR  Take 1 tablet by mouth once daily.     THEREMS-M 9 mg iron-400 mcg Tab tablet  Generic drug: multivit-iron-FA-calcium-mins  Take 1 tablet by mouth once daily.     traZODone 100 MG tablet  Commonly known as: DESYREL  Take 2 tablets (200 mg total) by mouth every evening.            STOP taking these medications      EScitalopram oxalate 10 MG tablet  Commonly known as: LEXAPRO              Indwelling Lines/Drains at time of discharge:   Lines/Drains/Airways       None                   Time spent on the discharge of patient: 40 minutes         Shannan High MD  Department of Hospital Medicine  [facility] - Telemetry Stepdown  "

## 2024-02-14 NOTE — PLAN OF CARE
"11:55am SW contacted pt via phone [phone] and spk w/ pt and  (Henrique) about HH orders. SW explain what the orders was for (PT/OT eval and treat and nursing) and pt and  declined HH for pt.     12:00pm SW went to pt room due to family and pt not exactly understanding the purpose of the HH orders, after SW thoroughly explained the orders, pt agreed with PT and stated she'll spk w/ HH agency about other referral/consult. SW inquire was there any suggested HH agency, both pt and  stated no "anyone who accept our insurance."  Pt HH orders was sent to [facility] through [employer].      1:17pm: DALILA contacted Bhavani [phone] at [facility] to inquire about referral. Bhavani stated referral in review and will callback with an update. DALILA informed Bhavani that pt was d/c today 02/14/2024.     1:25pm: DALILA received a callback from Bhavani ([facility]) informing pt was accepted and service will be started on Saturday 02/17/2024. *DALILA contacted pt  and provided update on company that accepted pt and provided DOS which is scheduled Saturday 02/17/2024. DALILA provided [facility] contact number to pt .     LEIDY Edward   Case Management Dept-[facility]  [phone]    "

## 2024-02-14 NOTE — NURSING
Reviewed discharge instructions with patient and family with verbalized understanding. Patient discharged via wheelchair with all personal belongings including (cell phone/, tablet). Patient left with family to home self care. IV, telemetry and tele-sitter discontinued per protocol. IV catheter  in tact. Patient will  meds from OP pharmacy and follow up with PCP.

## 2024-02-14 NOTE — PROGRESS NOTES
[id number]}        FOLLOW UP VISIT: ADDICTION PSYCHIATRY CONSULTATION SERVICE      ASSESSMENT AND PLAN:     DIAGNOSES & PROBLEMS:  Alcohol use disorder, severe, dependence     In Summary:  Patient is a 62 y/o F with past psych history of alcohol use disorder, severe, dependence, major depressive disorder, generalized anxiety disorder, and PMH of breast cancer, fibromyalgia, sarcoidosis, hypothyroidism, T2DM, and CLL who is admitted after recurrent falls secondary to alcohol use. Patient denies interest in residential rehab despite recommendation.  Does report interest in attending IOP upon discharge.      Plan:   - Continue Trazodone for sleep, 200 mg nightly    - Continue home Lexapro 10 mg daily (would recommend patient continue this at discharge, and be provided referral for outpatient psychiatric follow up to further discuss benefit of staying on and optimizing current antidepressant regimen, vs trialing new medications)  - recommend patient enroll in addiction residential rehab program after discharge and medical stabilization - patient given resources to call and be set up with [facility]  -Will provide resources for patient and continue discuss rehabilitation options regarding post discharge disposition as well as outpatient psychiatric treatment  - counseled on full abstinence from alcohol and substances of abuse (illicit and prescription)  - full engagement in 12 step (or equivalent) recovery program(s), including meeting attendance and acquisition/maintenance of sponsor      INTERVAL HISTORY AND ROS:     Patient with NAEON. She has not required PRN Ativan since 2/12. She is interviewed resting in bed comfortably, and says she is feeling better today. Says she is sleeping better since home Trazodone was added to regimen. Also eating well. She denies SI/HI/AVH. She is highly motivated to follow up with outpatient psychiatry upon discharge for better control of her depression, though again, denies any current  "suicidality at this time, and reports overall improvement in mood. Also reports intent to continue to abstain from alcohol use, and is interested in attending [facility] upon discharge. She otherwise has no further complaints.     CURRENT PSYCHOTROPIC REGIMEN:  - Lexapro 10 mg daily  - Trazodone 200 mg nightly       PERTINENT PAST HISTORY AND CHART REVIEW:     See consult note      EXAMINATION:     BP (!) 150/70 (BP Location: Right arm, Patient Position: Sitting)   Pulse 89   Temp 98.3 °F (36.8 °C) (Oral)   Resp 18   Ht 5' 6" (1.676 m)   Wt 86.2 kg (190 lb)   SpO2 95%   BMI 30.67 kg/m²     MENTAL STATUS EXAMINATION:  General Appearance & Behavior:  adequately groomed, appropriately dressed, in no apparent distress, under good behavioral control  Involuntary Movements and Motor Activity:  no abnormal involuntary movements noted, no psychomotor agitation or retardation  Speech & Language:  conversational, spontaneous, speaks and understands English proficiently, some slurred speech  Mood: "better"  Affect:  euthymic, reactive  Thought Process & Associations:  linear, goal-directed, organized  Thought Content & Perceptions:  no suicidal or homicidal ideation, no auditory hallucinations, no visual hallucinations, not responding to internal stimuli, no paranoid ideation, no persecutory delusions, no ideas of reference, no thought broadcasting, no thought insertion or withdrawal, no delusions  Sensorium and Cognition:  grossly intact, no significant deficits noted  Insight & Judgment:  insight and judgment adequate      RISK MANAGEMENT:     I[]I Y  I[x]I N  I[]I U  I[]I A  Suicidal Ideation/Behavior: **   I[]I Y  I[x]I N  I[]I U  I[]I A  Homicidal Ideation/Behavior: **  I[]I Y  I[x]I N  I[]I U  I[]I A  Violence: **  I[]I Y  I[x]I N  I[]I U  I[]I A  Self-Injurious Behavior: **    The patient is deemed to be a well-meaning but unreliable and factually inaccurate historian.    I[]I Y  I[x]I N  I[]I U  I[]I A  I[]I " N/A  Minimization of Risk Parameters Suspected/Evident: **  I[]I Y  I[x]I N  I[]I U  I[]I A  I[]I N/A  Exaggeration of Risk Parameters Suspected/Evident: **      [] Y  [x] N  Danger to Self:   [] Y  [x] N  Danger to Others:   [] Y  [x] N  Grave Disability:       In cases of emergency, daily coverage provided by Acute/ER Psych MD, NP, PA, or SW, with contact numbers located in [facility] On Call Schedule.    Alejandro Gibbs  [facility] PGY2  Department of Psychiatry  [facility]        KEY:     I[]I Y = Yes / Present / Endorses  I[]I N = No / Absent / Denies  I[]I U = Unknown / Unable to Assess / Unwilling to Participate  I[]I A = Ambiguity Exists / Accuracy Uncertain  I[]I D = Denial or Minimization is Suspected/Evident  I[]I N/A = Non-Applicable    CHART REVIEW:     Available documentation has been reviewed, and pertinent elements of the chart - including previous psychiatric evaluations - have been incorporated into this evaluation where appropriate.    ADVICE AND COUNSELING:     [x] In cases of emergencies (e.g. SI/HI resulting in danger to self or others, functioning deteriorates to the level of grave disability), call 911 or [phone], or present to the emergency department for immediate assistance.  [x] Patient should not operate a motor vehicle or heavy machinery if effects of medications or underlying symptoms/condition impair the ability to safely do so.    Alcohol, Tobacco, and Drug Counseling, as well as resources, has been provided, as warranted.     Shared medical decision making and informed consent are the hallmark and bedrock of good clinical care, and as such have been employed and obtained, respectively, to the degree possible.      Risk Mitigation Strategies, Harm Reduction Techniques, and Safety Netting are important interventions that can reduce acute and chronic risk, and as such have been employed to the degree possible.    Prescription Drug Management entails  the review, recommendation, or consideration without recommendation of medications, and as such was employed during the encounter.    Additional Psychoeducation has been provided, as warranted.    Discussed, to the extent possible, diagnosis, risks and benefits of proposed treatment vs alternative treatments vs no treatment, potential side effects of these treatments and the inherent unpredictability of treatment. The patient expresses understanding of the above and displays the capacity to agree with this treatment given said understanding. Patient also agrees that, currently, the benefits outweigh the risks and consents to treatment at this time.     Written material has been provided to supplement, augment, and reinforce any discussions and interventions, via the AVS or other pre-printed handouts, as warranted.      DIAGNOSTIC TESTING:     The chart was reviewed for recent diagnostic procedures and investigations, and pertinent results are noted below.    Na 137  2/14/2024   K 3.4 (L)  2/14/2024   Ca 8.4 (L)  2/14/2024  Phos 3.6  2/14/2024   Mg 1.4 (L)  2/14/2024     Glu 209 (H)  2/14/2024   HgA1c 6.0 (H)  2/11/2024    BUN 5 (L)  2/14/2024   Cr 0.8  2/14/2024   GFR >60.0  2/14/2024   Specific Gravity 1.015  2/10/2024   Protein (Urine) 2+ (A)  2/10/2024   Microalbumin *   *     T Prot 5.6 (L)  2/14/2024   Alb 2.9 (L)  2/14/2024   T Bili 0.2  2/14/2024   Alk Phos 51 (L)  2/14/2024    (H)  2/14/2024    (H)  2/14/2024   GGT *   *   NH3 29  4/6/2023   Amylase *   *   Lipase 10  2/10/2024    TSH 0.752  11/23/2023   Free T4 1.17  11/4/2023  PTH *   *  Prolactin *   *   CPK 3126 (H)  2/14/2024   Troponin I 0.012  2/2/2024   PT 10.7  9/17/2023   INR 1.0  9/17/2023    WBC 4.87  2/14/2024   RBC 3.16 (L)  2/14/2024   Hgb 8.1 (L)  2/14/2024   HCT 27.6 (L)  2/14/2024   MCV 87  2/14/2024  PLT 86 (L)  2/14/2024   ANC 1.8; 36.8;  (L)  2/14/2024    Cholesterol 184  4/6/2023    Triglycerides 161 (H)  4/6/2023   .8  4/6/2023   HDL 44  4/6/2023     B12 368  6/6/2023   Folate 13.3  4/6/2023   Thiamine 136 (H)  *   Vit D *   *     HIV 1/2 Ag/Ab Non-reactive  3/10/2023   Hep B Surface *   *   Hep B Core *   *   Hep A *   *   Hep C Non-reactive  3/10/2023   RPR *   *     Lithium *   *   VPA *   *   Clozapine *   *     Alcohol 18 (A)  2/10/2024   Benzodiazepines Presumptive Positive (A)  2/10/2024   Barbiturates Presumptive Positive (A)  2/10/2024   Cannabis Negative  2/10/2024   Cocaine Negative  2/10/2024   Amphetamines Negative  2/10/2024   PCP Negative  2/10/2024   Opiates Negative  2/10/2024   Methadone Negative  2/10/2024   Buprenorphine *   *   Fentanyl Not Detected  7/18/2023   Oxycodone Presumptive Positive (A)  7/18/2023   Tramadol *   *     Ethanol <10  2/10/2024  PETH 301  11/13/2023   EtG Negative  11/13/2023   EtS *   *   Buprenorphine *   *   Norbuprenorphine *   *

## 2024-02-14 NOTE — PLAN OF CARE
Pt AAOx4, c/o of Left arm pain, dressing CDI, ice in used. CIWA has been negative. BP elevated, Labetalol 10mg IVP PRN given once with +outcome. No acute distress noted. POC on going.    Problem: Adult Inpatient Plan of Care  Goal: Plan of Care Review  Outcome: Ongoing, Progressing  Goal: Absence of Hospital-Acquired Illness or Injury  Outcome: Ongoing, Progressing  Goal: Optimal Comfort and Wellbeing  Outcome: Ongoing, Progressing  Goal: Readiness for Transition of Care  Outcome: Ongoing, Progressing     Problem: Diabetes Comorbidity  Goal: Blood Glucose Level Within Targeted Range  Outcome: Ongoing, Progressing     Problem: Fall Injury Risk  Goal: Absence of Fall and Fall-Related Injury  Outcome: Ongoing, Progressing     Problem: Skin Injury Risk Increased  Goal: Skin Health and Integrity  Outcome: Ongoing, Progressing

## 2024-02-14 NOTE — PLAN OF CARE
Problem: Violence Risk or Actual  Goal: Anger and Impulse Control  Outcome: Met     Problem: Adult Inpatient Plan of Care  Goal: Plan of Care Review  Outcome: Met  Goal: Patient-Specific Goal (Individualized)  Outcome: Met  Goal: Absence of Hospital-Acquired Illness or Injury  Outcome: Met  Goal: Optimal Comfort and Wellbeing  Outcome: Met  Goal: Readiness for Transition of Care  Outcome: Met     Problem: Diabetes Comorbidity  Goal: Blood Glucose Level Within Targeted Range  Outcome: Met     Problem: Fluid and Electrolyte Imbalance (Acute Kidney Injury/Impairment)  Goal: Fluid and Electrolyte Balance  Outcome: Met     Problem: Oral Intake Inadequate (Acute Kidney Injury/Impairment)  Goal: Optimal Nutrition Intake  Outcome: Met     Problem: Renal Function Impairment (Acute Kidney Injury/Impairment)  Goal: Effective Renal Function  Outcome: Met     Problem: Impaired Wound Healing  Goal: Optimal Wound Healing  Outcome: Met     Problem: Fall Injury Risk  Goal: Absence of Fall and Fall-Related Injury  Outcome: Met     Problem: Skin Injury Risk Increased  Goal: Skin Health and Integrity  Outcome: Met     Problem: Infection  Goal: Absence of Infection Signs and Symptoms  Outcome: Met

## 2024-02-14 NOTE — PLAN OF CARE
[facility] - Telemetry Stepdown      HOME HEALTH ORDERS  FACE TO FACE ENCOUNTER    Patient Name: Earl Abdul  YOB: 1960    PCP: Andrew Rodriguez MD   PCP Address: [address] [place] [address] / [facility] [address]  PCP Phone Number: [phone]  PCP Fax: [phone]    Encounter Date: 2/10/24    Admit to Home Health    Diagnoses:  Active Hospital Problems    Diagnosis  POA    *Rhabdomyolysis [M62.82]  Yes    Left forearm pain [M79.632]  Yes    Depression [F32.A]  Yes    CLL (chronic lymphocytic leukemia) [C91.10]  Yes     Chronic     6/22/22 - PB flow cytometry  Immunophenotyping of peripheral blood detects a distinct kappa light chain   restricted monoclonal B-cell population  (calculated at 2.44x10   9 /L, from the most recent CBC showing a total WBC of  7.35 K/uL with 61%   total lymphocytes)  with a CLL phenotype (coexpression of CD19, CD5, CD23 and   dim CD20). CD22 (FITC), FMC-7 and CD38 are negative in this population.   Without associated lymphadenopathy, organomegaly, other extramedullary   involvement, or any other features of a B-cell lymphoproliferative disorder,   a monoclonal B-cell count   <5x10 9 /L in the peripheral blood should be classified as monoclonal B-cell   lymphocytosis (MBL) (WHO Classification of Tumors of Hematopoietic and   Lymphoid Tissues, 2017). Correlation with clinical presentation and other   laboratory results is required.      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     In CLL, a 13q deletion is associated with a favorable   prognosis (Green et al., Blood 127:5466-8332, 2016; Van   Dyke et al., Brit J Haematol 173:105-113, 2016).       Hypothyroidism [E03.9]  Yes    Type 2 diabetes mellitus with hypoglycemia [E11.649]  Yes    Malignant neoplasm of central portion of right breast in female, estrogen receptor positive [C50.111, Z17.0]  Not Applicable     Chronic      Mammogram on September 4, 2020 showed a focal asymmetry in the retroareolar region of the right breast.  Ultrasound at that time showed a 9 x 5 x 8 mm hypoechoic mass at 12:00 p.m..     Biopsy on September 29, 2020 showed grade 2 infiltrating ductal carcinoma (histologic grade 3, nuclear grade 2, mitotic index 2).  Tumor was 95% ER positive 20-30% WA positive and HER2 was 2+ but negative by FISH.  Ki-67 was 80%.     On October 29, 2020 bilateral mastectomy and right axillary sentinel lymph node biopsy was performed.  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  Margins were negative with closest margin 6 mm.  The sentinel lymph node was negative.    Final pathological staging T1b N0 stage IA.      Thrombocytopenia [D69.6]  Yes    Depression with anxiety [F41.8]  Yes    DEVANTE (acute kidney injury) [N17.9]  Yes    Alcohol use disorder, severe, dependence [F10.20]  Yes    Alcohol withdrawal [F10.939]  Yes    Essential hypertension [I10]  Yes     Chronic    Hyperlipidemia [E78.5]  Yes     Chronic     Overview:   ICD-10 Transition      Sarcoidosis [D86.9]  Yes      Resolved Hospital Problems    Diagnosis Date Resolved POA    GERD (gastroesophageal reflux disease) [K21.9] 02/10/2024 Yes     Chronic       Follow Up Appointments:  Future Appointments   Date Time Provider Department Center   2/22/2024  3:00 PM Andrew Rodriguez MD [facility]   5/16/2024  9:00 AM [facility] [place] [facility]   5/16/2024 10:00 AM Miguel Montano MD [facility] [place] [facility]       Allergies:  Review of patient's allergies indicates:   Allergen Reactions    Lortab [hydrocodone-acetaminophen] Itching    Promethazine Itching and Other (See Comments)       Medications: Review discharge medications with patient and family and provide education.    Current Facility-Administered Medications   Medication Dose Route Frequency Provider Last Rate Last Admin    cefTRIAXone (Rocephin) 1 g in dextrose 5 % in  water (D5W) 100 mL IVPB (MB+)  1 g Intravenous Q24H Yris Maurer MD   Stopped at 02/13/24 1933    dextrose 10% bolus 125 mL 125 mL  12.5 g Intravenous PRN Reji Sullivan MD        dextrose 10% bolus 250 mL 250 mL  25 g Intravenous PRN Reji Sullivan MD        EScitalopram oxalate tablet 10 mg  10 mg Oral Daily Reji Sullivan MD   10 mg at 02/14/24 0941    folic acid tablet 1 mg  1 mg Oral Daily Reji Sullivan MD   1 mg at 02/14/24 0941    gabapentin capsule 300 mg  300 mg Oral TID Lloyd Whitley MD   300 mg at 02/14/24 0941    glucagon (human recombinant) injection 1 mg  1 mg Intramuscular PRN Reji Sullivan MD        glucose chewable tablet 16 g  16 g Oral PRN Yris Maurer MD        glucose chewable tablet 24 g  24 g Oral PRN Yris Maurer MD        heparin (porcine) injection 5,000 Units  5,000 Units Subcutaneous Q8H Yris Maurer MD   5,000 Units at 02/14/24 0522    HYDROmorphone injection 0.5 mg  0.5 mg Intravenous Q6H PRN Yris Maurer MD   0.5 mg at 02/13/24 1659    insulin aspart U-100 pen 0-5 Units  0-5 Units Subcutaneous Q6H PRN Reji Sullivan MD   2 Units at 02/14/24 0546    iohexoL (OMNIPAQUE 350) injection 100 mL  100 mL Intravenous ONCE PRN Dorota Balderas MD        labetalol 20 mg/4 mL (5 mg/mL) IV syring  10 mg Intravenous Q6H PRN Abel Kan DO   10 mg at 02/13/24 2017    levothyroxine tablet 75 mcg  75 mcg Oral Before breakfast Reji Sullivan MD   75 mcg at 02/14/24 0522    LORazepam injection 2 mg  2 mg Intravenous Q2H PRN Yris Maurer MD   2 mg at 02/12/24 2026    LORazepam tablet 2 mg  2 mg Oral Q8H Yris Maurer MD   2 mg at 02/14/24 0522    losartan-hydrochlorothiazide 100-25 mg per tablet 1 tablet  1 tablet Oral Daily Shannan High MD   1 tablet at 02/14/24 0941    magnesium sulfate 2g in water 50mL IVPB (premix)  2 g Intravenous Once Shannan High MD 25 mL/hr at 02/14/24 0945 2 g at  02/14/24 0945    melatonin tablet 6 mg  6 mg Oral Nightly PRN Yris Maurer MD   6 mg at 02/13/24 2009    multivitamin tablet  1 tablet Oral Daily Reji Sullivan MD   1 tablet at 02/14/24 0941    naloxone 0.4 mg/mL injection 0.02 mg  0.02 mg Intravenous PRN Yris Maurer MD        predniSONE tablet 20 mg  20 mg Oral Daily Yris Maurer MD   20 mg at 02/14/24 0941    sodium chloride 0.9% flush 10 mL  10 mL Intravenous Q12H PRN Yris Maurer MD        sodium chloride 0.9% flush 10 mL  10 mL Intravenous Q6H Lloyd Whitley MD   10 mL at 02/14/24 0522    And    sodium chloride 0.9% flush 10 mL  10 mL Intravenous PRN Lloyd Whitley MD   10 mL at 02/13/24 1701    thiamine tablet 100 mg  100 mg Oral Daily Yris Maurer MD   100 mg at 02/14/24 0941    traZODone tablet 200 mg  200 mg Oral QHS Lloyd Whitley MD   200 mg at 02/13/24 2009     Facility-Administered Medications Ordered in Other Encounters   Medication Dose Route Frequency Provider Last Rate Last Admin    albuterol sulfate nebulizer solution 2.5 mg  2.5 mg Nebulization Once Michael, Andrew HAAS MD         Current Discharge Medication List        START taking these medications    Details   FLUoxetine 10 MG capsule Take 1 capsule (10 mg total) by mouth once daily.  Qty: 30 capsule, Refills: 11      gabapentin (NEURONTIN) 300 MG capsule Take 1 capsule (300 mg total) by mouth 3 (three) times daily.  Qty: 90 capsule, Refills: 0      predniSONE (DELTASONE) 20 MG tablet Take 1 tablet (20 mg total) by mouth once daily. for 3 days  Qty: 3 tablet, Refills: 0      thiamine 100 MG tablet Take 1 tablet (100 mg total) by mouth once daily.  Qty: 30 tablet, Refills: 0           CONTINUE these medications which have NOT CHANGED    Details   baclofen (LIORESAL) 5 mg Tab tablet Take 1 tablet (5 mg total) by mouth 3 (three) times daily.  Qty: 30 tablet, Refills: 0      blood sugar diagnostic Strp Use to check blood glucose  "4 times daily  Qty: 100 each, Refills: 5    Associated Diagnoses: Alcoholic ketoacidosis      blood-glucose meter Misc Use to check blood glucose 4 times daily  Qty: 1 each, Refills: 0      folic acid (FOLVITE) 1 MG tablet Take 1 tablet (1 mg total) by mouth once daily.  Qty: 30 tablet, Refills: 0      insulin (LANTUS SOLOSTAR U-100 INSULIN) glargine 100 units/mL SubQ pen Inject 15 Units into the skin 2 (two) times a day.  Qty: 9 mL, Refills: 0      insulin lispro (HUMALOG KWIKPEN INSULIN) 100 unit/mL pen Inject 9 Units into the skin 3 (three) times daily with meals.  Qty: 9 mL, Refills: 0      lancets Misc Use to check blood glucose 4 times daily  Qty: 100 each, Refills: 5      levothyroxine (SYNTHROID) 75 MCG tablet Take 1 tablet (75 mcg total) by mouth before breakfast.  Qty: 30 tablet, Refills: 0      loperamide (IMODIUM) 2 mg capsule Take 1 capsule (2 mg total) by mouth 4 (four) times daily as needed for Diarrhea.  Qty: 60 capsule, Refills: 0      losartan-hydrochlorothiazide 100-25 mg (HYZAAR) 100-25 mg per tablet Take 1 tablet by mouth once daily.  Qty: 30 tablet, Refills: 0    Comments: .      multivitamin Tab Take 1 tablet by mouth once daily.  Qty: 30 tablet, Refills: 0      pen needle, diabetic 32 gauge x 5/32" Ndle Use to inject insulin 5 (five) times daily.  Qty: 100 each, Refills: 5      traZODone (DESYREL) 100 MG tablet Take 2 tablets (200 mg total) by mouth every evening.  Qty: 60 tablet, Refills: 11           STOP taking these medications       EScitalopram oxalate (LEXAPRO) 10 MG tablet Comments:   Reason for Stopping:                 I have seen and examined this patient within the last 30 days. My clinical findings that support the need for the home health skilled services and home bound status are the following:no   Weakness/numbness causing balance and gait disturbance due to alcohol use disorder making it taxing to leave home.      Diet:   renal diet    Labs:  Report Lab results to " PCP.    Referrals/ Consults  Physical Therapy to evaluate and treat. Evaluate for home safety and equipment needs; Establish/upgrade home exercise program. Perform / instruct on therapeutic exercises, gait training, transfer training, and Range of Motion.  Occupational Therapy to evaluate and treat. Evaluate home environment for safety and equipment needs. Perform/Instruct on transfers, ADL training, ROM, and therapeutic exercises.  Aide to provide assistance with personal care, ADLs, and vital signs.    Activities:   activity as tolerated    Nursing:   Agency to admit patient within 24 hours of hospital discharge unless specified on physician order or at patient request    SN to complete comprehensive assessment including routine vital signs. Instruct on disease process and s/s of complications to report to MD. Review/verify medication list sent home with the patient at time of discharge  and instruct patient/caregiver as needed. Frequency may be adjusted depending on start of care date.     Skilled nurse to perform up to 3 visits PRN for symptoms related to diagnosis    Notify MD if SBP > 160 or < 90; DBP > 90 or < 50; HR > 120 or < 50; Temp > 101; O2 < 88%; Other:       Ok to schedule additional visits based on staff availability and patient request on consecutive days within the home health episode.    When multiple disciplines ordered:    Start of Care occurs on Sunday - Wednesday schedule remaining discipline evaluations as ordered on separate consecutive days following the start of care.    Thursday SOC -schedule subsequent evaluations Friday and Monday the following week.     Friday - Saturday SOC - schedule subsequent discipline evaluations on consecutive days starting Monday of the following week.    For all post-discharge communication and subsequent orders please contact patient's primary care physician.     Miscellaneous   Home Oxygen:  Oxygen at 2 L/min nasal canula     Home Health Aide:  Nursing Three  times weekly, Physical Therapy Three times weekly, Occupational Therapy Three times weekly, and Home Health Aide Three times weekly    Wound Care Orders  no    I certify that this patient is confined to her home and needs intermittent skilled nursing care, physical therapy, and occupational therapy.

## 2024-02-15 LAB — BACTERIA SPEC AEROBE CULT: NO GROWTH

## 2024-02-16 ENCOUNTER — HOSPITAL ENCOUNTER (EMERGENCY)
Facility: HOSPITAL | Age: 64
Discharge: HOME | End: 2024-02-16
Attending: EMERGENCY MEDICINE
Payer: COMMERCIAL

## 2024-02-16 VITALS
OXYGEN SATURATION: 96 % | WEIGHT: 190.06 LBS | TEMPERATURE: 98 F | SYSTOLIC BLOOD PRESSURE: 138 MMHG | RESPIRATION RATE: 17 BRPM | HEART RATE: 83 BPM | BODY MASS INDEX: 30.67 KG/M2 | DIASTOLIC BLOOD PRESSURE: 69 MMHG

## 2024-02-16 DIAGNOSIS — M79.10 MYALGIA: Primary | ICD-10-CM

## 2024-02-16 DIAGNOSIS — R40.0 SOMNOLENCE: ICD-10-CM

## 2024-02-16 LAB
ALBUMIN SERPL BCP-MCNC: 3.1 G/DL (ref 3.5–5.2)
ALLENS TEST: ABNORMAL
ALP SERPL-CCNC: 43 U/L (ref 55–135)
ALT SERPL W/O P-5'-P-CCNC: 103 U/L (ref 10–44)
ANION GAP SERPL CALC-SCNC: 8 MMOL/L (ref 8–16)
ANISOCYTOSIS BLD QL SMEAR: SLIGHT
AST SERPL-CCNC: 63 U/L (ref 10–40)
BACTERIA #/AREA URNS AUTO: ABNORMAL /HPF
BASOPHILS # BLD AUTO: ABNORMAL K/UL (ref 0–0.2)
BASOPHILS NFR BLD: 0 % (ref 0–1.9)
BILIRUB SERPL-MCNC: 0.4 MG/DL (ref 0.1–1)
BILIRUB UR QL STRIP: NEGATIVE
BUN SERPL-MCNC: 8 MG/DL (ref 8–23)
CALCIUM SERPL-MCNC: 8.6 MG/DL (ref 8.7–10.5)
CHLORIDE SERPL-SCNC: 100 MMOL/L (ref 95–110)
CK SERPL-CCNC: 833 U/L (ref 20–180)
CLARITY UR REFRACT.AUTO: ABNORMAL
CO2 SERPL-SCNC: 31 MMOL/L (ref 23–29)
COLOR UR AUTO: COLORLESS
CREAT SERPL-MCNC: 0.8 MG/DL (ref 0.5–1.4)
DIFFERENTIAL METHOD BLD: ABNORMAL
EOSINOPHIL # BLD AUTO: ABNORMAL K/UL (ref 0–0.5)
EOSINOPHIL NFR BLD: 0 % (ref 0–8)
ERYTHROCYTE [DISTWIDTH] IN BLOOD BY AUTOMATED COUNT: 16.8 % (ref 11.5–14.5)
EST. GFR  (NO RACE VARIABLE): >60 ML/MIN/1.73 M^2
ETHANOL SERPL-MCNC: <10 MG/DL
GLUCOSE SERPL-MCNC: 95 MG/DL (ref 70–110)
GLUCOSE UR QL STRIP: NEGATIVE
HCO3 UR-SCNC: 37.2 MMOL/L (ref 24–28)
HCT VFR BLD AUTO: 28.3 % (ref 37–48.5)
HCT VFR BLD CALC: 26 %PCV (ref 36–54)
HGB BLD-MCNC: 8.8 G/DL (ref 12–16)
HGB UR QL STRIP: NEGATIVE
HYPOCHROMIA BLD QL SMEAR: ABNORMAL
IMM GRANULOCYTES # BLD AUTO: ABNORMAL K/UL (ref 0–0.04)
IMM GRANULOCYTES NFR BLD AUTO: ABNORMAL % (ref 0–0.5)
INFLUENZA A, MOLECULAR: NEGATIVE
INFLUENZA B, MOLECULAR: NEGATIVE
KETONES UR QL STRIP: NEGATIVE
LEUKOCYTE ESTERASE UR QL STRIP: ABNORMAL
LYMPHOCYTES # BLD AUTO: ABNORMAL K/UL (ref 1–4.8)
LYMPHOCYTES NFR BLD: 66 % (ref 18–48)
MCH RBC QN AUTO: 26.4 PG (ref 27–31)
MCHC RBC AUTO-ENTMCNC: 31.1 G/DL (ref 32–36)
MCV RBC AUTO: 85 FL (ref 82–98)
MICROSCOPIC COMMENT: ABNORMAL
MONOCYTES # BLD AUTO: ABNORMAL K/UL (ref 0.3–1)
MONOCYTES NFR BLD: 5 % (ref 4–15)
NEUTROPHILS NFR BLD: 29 % (ref 38–73)
NITRITE UR QL STRIP: NEGATIVE
NRBC BLD-RTO: 0 /100 WBC
OHS QRS DURATION: 80 MS
OHS QTC CALCULATION: 415 MS
OVALOCYTES BLD QL SMEAR: ABNORMAL
PCO2 BLDA: 64.3 MMHG (ref 35–45)
PH SMN: 7.37 [PH] (ref 7.35–7.45)
PH UR STRIP: 6 [PH] (ref 5–8)
PLATELET # BLD AUTO: 120 K/UL (ref 150–450)
PMV BLD AUTO: ABNORMAL FL (ref 9.2–12.9)
PO2 BLDA: 24 MMHG (ref 40–60)
POC BE: 12 MMOL/L
POC CARDIAC TROPONIN I: 0.01 NG/ML (ref 0–0.08)
POC IONIZED CALCIUM: 1.13 MMOL/L (ref 1.06–1.42)
POC SATURATED O2: 40 % (ref 95–100)
POC TCO2: 39 MMOL/L (ref 24–29)
POIKILOCYTOSIS BLD QL SMEAR: SLIGHT
POLYCHROMASIA BLD QL SMEAR: ABNORMAL
POTASSIUM BLD-SCNC: 4.1 MMOL/L (ref 3.5–5.1)
POTASSIUM SERPL-SCNC: 3.5 MMOL/L (ref 3.5–5.1)
PROT SERPL-MCNC: 5.7 G/DL (ref 6–8.4)
PROT UR QL STRIP: NEGATIVE
RBC # BLD AUTO: 3.33 M/UL (ref 4–5.4)
RBC #/AREA URNS AUTO: 0 /HPF (ref 0–4)
SAMPLE: ABNORMAL
SAMPLE: NORMAL
SARS-COV-2 RDRP RESP QL NAA+PROBE: NEGATIVE
SITE: ABNORMAL
SODIUM BLD-SCNC: 138 MMOL/L (ref 136–145)
SODIUM SERPL-SCNC: 139 MMOL/L (ref 136–145)
SP GR UR STRIP: 1.01 (ref 1–1.03)
SPECIMEN SOURCE: NORMAL
SPHEROCYTES BLD QL SMEAR: ABNORMAL
SQUAMOUS #/AREA URNS AUTO: 3 /HPF
TROPONIN I SERPL DL<=0.01 NG/ML-MCNC: 0.01 NG/ML (ref 0–0.03)
URN SPEC COLLECT METH UR: ABNORMAL
WBC # BLD AUTO: 9.57 K/UL (ref 3.9–12.7)
WBC #/AREA URNS AUTO: 16 /HPF (ref 0–5)

## 2024-02-16 PROCEDURE — 84484 ASSAY OF TROPONIN QUANT: CPT

## 2024-02-16 PROCEDURE — 63600175 PHARM REV CODE 636 W HCPCS: Performed by: EMERGENCY MEDICINE

## 2024-02-16 PROCEDURE — 99285 EMERGENCY DEPT VISIT HI MDM: CPT | Mod: 25

## 2024-02-16 PROCEDURE — 82550 ASSAY OF CK (CPK): CPT | Performed by: EMERGENCY MEDICINE

## 2024-02-16 PROCEDURE — 96374 THER/PROPH/DIAG INJ IV PUSH: CPT

## 2024-02-16 PROCEDURE — 81001 URINALYSIS AUTO W/SCOPE: CPT | Mod: 59 | Performed by: EMERGENCY MEDICINE

## 2024-02-16 PROCEDURE — 84484 ASSAY OF TROPONIN QUANT: CPT | Performed by: EMERGENCY MEDICINE

## 2024-02-16 PROCEDURE — 87502 INFLUENZA DNA AMP PROBE: CPT | Performed by: EMERGENCY MEDICINE

## 2024-02-16 PROCEDURE — U0002 COVID-19 LAB TEST NON-CDC: HCPCS | Performed by: EMERGENCY MEDICINE

## 2024-02-16 PROCEDURE — 25000003 PHARM REV CODE 250: Performed by: EMERGENCY MEDICINE

## 2024-02-16 PROCEDURE — 80053 COMPREHEN METABOLIC PANEL: CPT | Performed by: EMERGENCY MEDICINE

## 2024-02-16 PROCEDURE — 85007 BL SMEAR W/DIFF WBC COUNT: CPT | Performed by: EMERGENCY MEDICINE

## 2024-02-16 PROCEDURE — 93010 ELECTROCARDIOGRAM REPORT: CPT | Mod: ,,, | Performed by: INTERNAL MEDICINE

## 2024-02-16 PROCEDURE — 93005 ELECTROCARDIOGRAM TRACING: CPT

## 2024-02-16 PROCEDURE — 96361 HYDRATE IV INFUSION ADD-ON: CPT

## 2024-02-16 PROCEDURE — 82803 BLOOD GASES ANY COMBINATION: CPT

## 2024-02-16 PROCEDURE — 96375 TX/PRO/DX INJ NEW DRUG ADDON: CPT

## 2024-02-16 PROCEDURE — 87086 URINE CULTURE/COLONY COUNT: CPT | Performed by: EMERGENCY MEDICINE

## 2024-02-16 PROCEDURE — 99900035 HC TECH TIME PER 15 MIN (STAT)

## 2024-02-16 PROCEDURE — 85027 COMPLETE CBC AUTOMATED: CPT | Performed by: EMERGENCY MEDICINE

## 2024-02-16 PROCEDURE — 82077 ASSAY SPEC XCP UR&BREATH IA: CPT | Performed by: EMERGENCY MEDICINE

## 2024-02-16 PROCEDURE — 80048 BASIC METABOLIC PNL TOTAL CA: CPT | Mod: XB

## 2024-02-16 RX ORDER — IPRATROPIUM BROMIDE AND ALBUTEROL SULFATE 2.5; .5 MG/3ML; MG/3ML
3 SOLUTION RESPIRATORY (INHALATION)
Status: DISCONTINUED | OUTPATIENT
Start: 2024-02-16 | End: 2024-02-16 | Stop reason: HOSPADM

## 2024-02-16 RX ORDER — NITROFURANTOIN 25; 75 MG/1; MG/1
100 CAPSULE ORAL 2 TIMES DAILY
Qty: 10 CAPSULE | Refills: 0 | Status: SHIPPED | OUTPATIENT
Start: 2024-02-16 | End: 2024-02-16

## 2024-02-16 RX ORDER — ONDANSETRON HYDROCHLORIDE 2 MG/ML
4 INJECTION, SOLUTION INTRAVENOUS
Status: COMPLETED | OUTPATIENT
Start: 2024-02-16 | End: 2024-02-16

## 2024-02-16 RX ORDER — ACETAMINOPHEN 325 MG/1
650 TABLET ORAL
Status: COMPLETED | OUTPATIENT
Start: 2024-02-16 | End: 2024-02-16

## 2024-02-16 RX ORDER — KETOROLAC TROMETHAMINE 30 MG/ML
10 INJECTION, SOLUTION INTRAMUSCULAR; INTRAVENOUS
Status: COMPLETED | OUTPATIENT
Start: 2024-02-16 | End: 2024-02-16

## 2024-02-16 RX ADMIN — ONDANSETRON 4 MG: 2 INJECTION INTRAMUSCULAR; INTRAVENOUS at 07:02

## 2024-02-16 RX ADMIN — SODIUM CHLORIDE, POTASSIUM CHLORIDE, SODIUM LACTATE AND CALCIUM CHLORIDE 1000 ML: 600; 310; 30; 20 INJECTION, SOLUTION INTRAVENOUS at 06:02

## 2024-02-16 RX ADMIN — ACETAMINOPHEN 650 MG: 325 TABLET ORAL at 06:02

## 2024-02-16 RX ADMIN — KETOROLAC TROMETHAMINE 10 MG: 30 INJECTION, SOLUTION INTRAMUSCULAR; INTRAVENOUS at 06:02

## 2024-02-16 NOTE — ED NOTES
Patient identifiers for Earl Abdul 63 y.o. female checked and correct.  Chief Complaint   Patient presents with    Arm Injury     Pt recently admitted for rhabdomyolysis and told to come back to ED if pain worsens. Pt c/o left arm pain.     Past Medical History:   Diagnosis Date    Alcoholism     c/b alcohol withdrawl seizures 7/2017    Anemia     Cancer of breast 10/2020    s/p bilateral mastectomy for  T1b N0 stage IA breast cancer October 2020    CLL (chronic lymphocytic leukemia) 09/30/2022 6/22/22 - PB flow cytometry  Immunophenotyping of peripheral blood detects a distinct kappa light chain restricted monoclonal B-cell population  (calculated at 2.44x10 9 /L, from the most recent CBC showing a total WBC of  7.35 K/uL with 61% total lymphocytes)  with a CLL phenotype (coexpression of CD19, CD5, CD23 and dim CD20). CD22 (FITC), FMC-7 and CD38 are negative in this population.    Controlled type 2 diabetes mellitus without complication, without long-term current use of insulin 11/30/2021    COPD (chronic obstructive pulmonary disease)     Depression     Diverticulitis     Fatty liver     GERD (gastroesophageal reflux disease)     Hyperlipidemia     Hypertension     Pancreatitis     Peptic ulcer disease     Polysubstance abuse     Posterior reversible encephalopathy syndrome     Sarcoidosis of lung     over 30 yrs ago    Suicide attempt      Allergies reported:   Review of patient's allergies indicates:   Allergen Reactions    Lortab [hydrocodone-acetaminophen] Itching    Promethazine Itching and Other (See Comments)         LOC: Patient responds to voice.  Patient is oriented x 4 and speaking appropriately.  APPEARANCE: Patient resting comfortably and in no acute distress. Patient is clean and well groomed, patient's clothing is properly fastened.  SKIN: The skin is warm and dry. Patient has normal skin turgor and moist mucus membranes. Laceration with no bleeding to left lower posterior  arm  MUSKULOSKELETAL: Patient is moving all extremities well, no obvious deformities noted. Pulses intact. 90-91% on RA, hx of COPD  RESPIRATORY: Airway is open and patent. Respirations are spontaneous and non-labored with normal effort and rate.  CARDIAC: Patient has a normal rate and rhythm. Normal sinus on cardiac monitor. No peripheral edema noted.   ABDOMEN: No distention noted. Soft and non-tender upon palpation.  NEUROLOGICAL:  PERRL. Facial expression is symmetrical. Hand grasps are equal bilaterally. Normal sensation in all extremities when touched with finger.           Nsaids Pregnancy And Lactation Text: These medications are considered safe up to 30 weeks gestation. It is excreted in breast milk.

## 2024-02-16 NOTE — ED TRIAGE NOTES
Earl Abdul, a 63 y.o. female presents to the ED w/ complaint of admitted for rhabdomyolysis and told to come back to ED if pain worsens. Pt c/o left arm pain.     Triage note:  Chief Complaint   Patient presents with    Arm Injury     Pt recently admitted for rhabdomyolysis and told to come back to ED if pain worsens. Pt c/o left arm pain.     Review of patient's allergies indicates:   Allergen Reactions    Lortab [hydrocodone-acetaminophen] Itching    Promethazine Itching and Other (See Comments)     Past Medical History:   Diagnosis Date    Alcoholism     c/b alcohol withdrawl seizures 7/2017    Anemia     Cancer of breast 10/2020    s/p bilateral mastectomy for  T1b N0 stage IA breast cancer October 2020    CLL (chronic lymphocytic leukemia) 09/30/2022 6/22/22 - PB flow cytometry  Immunophenotyping of peripheral blood detects a distinct kappa light chain restricted monoclonal B-cell population  (calculated at 2.44x10 9 /L, from the most recent CBC showing a total WBC of  7.35 K/uL with 61% total lymphocytes)  with a CLL phenotype (coexpression of CD19, CD5, CD23 and dim CD20). CD22 (FITC), FMC-7 and CD38 are negative in this population.    Controlled type 2 diabetes mellitus without complication, without long-term current use of insulin 11/30/2021    COPD (chronic obstructive pulmonary disease)     Depression     Diverticulitis     Fatty liver     GERD (gastroesophageal reflux disease)     Hyperlipidemia     Hypertension     Pancreatitis     Peptic ulcer disease     Polysubstance abuse     Posterior reversible encephalopathy syndrome     Sarcoidosis of lung     over 30 yrs ago    Suicide attempt    Patient identifiers for Earl Abdul checked and correct.    LOC: The patient is awake, alert and aware of environment with an appropriate affect, the patient is oriented x 4 and speaking appropriately.    APPEARANCE: Patient resting comfortably and in no acute distress, patient is clean and well  groomed, patient's clothing is properly fastened.    SKIN: The skin is warm and dry, color consistent with ethnicity, patient has normal skin turgor and moist mucus membranes, skin intact, no breakdown or bruising noted.    MUSCULOSKELETAL: Patient not moving all extremities well, with obvious swelling on left wrist but no deformities noted.    RESPIRATORY: Airway is open and patent, respirations are spontaneous and even, patient has a normal effort and rate.    CARDIAC: Patient has a normal rate and rhythm, no periphreal edema noted, capillary refill < 3 seconds.    ABDOMEN: Soft and non tender to palpation, no distention noted. Patient denies any nausea, vomiting, diarrhea, or constipation.     NEUROLOGIC: Eyes open spontaneously, PERRL, behavior appropriate to situation, follows commands, facial expression symmetrical, right hand hand grasp equal and even, left hand weakness, purposeful motor response noted, normal sensation in all extremities.     HEENT: No abnormalities noted. White sclera and pupils equal round and reactive to light. Denies headache, dizziness.     : Pt voids independently, denies dysuria, hematuria, frequency.

## 2024-02-16 NOTE — ED PROVIDER NOTES
Encounter Date: 2/16/2024       History     Chief Complaint   Patient presents with    Arm Injury     Pt recently admitted for rhabdomyolysis and told to come back to ED if pain worsens. Pt c/o left arm pain.     62 yo W with extensive pmhx including EtOH use disorder with severe withdrawals, CLL not currently on treatment, COPD, HTN, HLD, polysubstance abuse, depression, sarcoidosis, PRES, alcohol dependence, R breast CA s/p bilat mastectomy, recent hospitalization for rhabdomyolysis in the setting of heavy ethanol use with left arm injury presents with a chief complaint of generalized myalgias.  She notes that began yesterday but became severe overnight, requiring heard her wake up her .  He was assisted with the history.  They note associated headache.  She tried Motrin without relief in symptoms.  No sick contacts, fever, cough, chest pain, shortness of breath.  She has not had alcohol for 1 week since recent hospitalization.  Triage noted she had left arm pain but patient actually denies that.  Patient was hospitalized from February 10th to 14th for rhabdo an DEVANTE and alcohol withdrawal.  She received IV fluids and benzos with improvement of DEVANTE.  Prior to that she had an ICU stay for alcohol withdrawal but left AMA.  During most recent hospitalization, she complained of left forearm pain with left hand numbness and weakness.  She had decreased sensation of the left hand along with weakness in extension of the digits and weakness of the ab/adduction.  Left elbow had painful range of motion with a small effusion.  Orthopedics felt that presentation due to delayed presentation of compartment syndrome versus pressure related injury of the posterior interosseous and ulnar nerves.  They recommended sling for comfort, multimodal pain regimen, PT OT.  Soft compartments and they do not suspect this is a car syndrome.  They recommended a splint for soft tissue rest and ice and elevation but they were uncertain if  she would regain function in her hand.  She has been compliant with the splint.  She has a scab over the posterior aspect of the left wrist but it does not seem to be worsening    The history is provided by the patient and the spouse.     Review of patient's allergies indicates:   Allergen Reactions    Lortab [hydrocodone-acetaminophen] Itching    Promethazine Itching and Other (See Comments)     Past Medical History:   Diagnosis Date    Alcoholism     c/b alcohol withdrawl seizures 7/2017    Anemia     Cancer of breast 10/2020    s/p bilateral mastectomy for  T1b N0 stage IA breast cancer October 2020    CLL (chronic lymphocytic leukemia) 09/30/2022 6/22/22 - PB flow cytometry  Immunophenotyping of peripheral blood detects a distinct kappa light chain restricted monoclonal B-cell population  (calculated at 2.44x10 9 /L, from the most recent CBC showing a total WBC of  7.35 K/uL with 61% total lymphocytes)  with a CLL phenotype (coexpression of CD19, CD5, CD23 and dim CD20). CD22 (FITC), FMC-7 and CD38 are negative in this population.    Controlled type 2 diabetes mellitus without complication, without long-term current use of insulin 11/30/2021    COPD (chronic obstructive pulmonary disease)     Depression     Diverticulitis     Fatty liver     GERD (gastroesophageal reflux disease)     Hyperlipidemia     Hypertension     Pancreatitis     Peptic ulcer disease     Polysubstance abuse     Posterior reversible encephalopathy syndrome     Sarcoidosis of lung     over 30 yrs ago    Suicide attempt      Past Surgical History:   Procedure Laterality Date    APPENDECTOMY      BILATERAL MASTECTOMY Bilateral 10/29/2020    Procedure: MASTECTOMY, BILATERAL;  Surgeon: Baylee Kevin MD;  Location: [facility] OR [address] [place] [address];  Service: General;  Laterality: Bilateral;    BREAST REVISION SURGERY Bilateral 2/11/2021    Procedure: BREAST REVISION SURGERY;  Surgeon: Scottie Johnson MD;  Location: [facility] OR [address] [place] [address];  Service: Plastics;   Laterality: Bilateral;    COLONOSCOPY N/A 7/28/2017    Procedure: COLONOSCOPY;  Surgeon: Aaron Alvarado MD;  [facility];  Service: Endoscopy;  Laterality: N/A;    ESOPHAGOGASTRODUODENOSCOPY  10/7/2016, 11/6/2014    2016 - gastritis, duodenitis, 2014 erosive gastritis    ESOPHAGOGASTRODUODENOSCOPY N/A 2/11/2020    Procedure: ESOPHAGOGASTRODUODENOSCOPY (EGD);  Surgeon: Fawn Garrido MD;  Location: [facility] ENDO;  Service: Endoscopy;  Laterality: N/A;    ESOPHAGOGASTRODUODENOSCOPY N/A 4/19/2021    Procedure: EGD (ESOPHAGOGASTRODUODENOSCOPY);  Surgeon: Paramjit Martino MD;  Location[facility];  Service: Endoscopy;  Laterality: N/A;    FLEXIBLE SIGMOIDOSCOPY  11/06/2014    colitis    HYSTERECTOMY      IMPLANTATION OF PERMANENT SACRAL NERVE STIMULATOR N/A 7/12/2022    Procedure: INSERTION, NEUROSTIMULATOR, PERMANENT, SACRAL;  Surgeon: Juaquin Edwards MD;  Location: [facility] OR [address] [place] [address];  Service: Urology;  Laterality: N/A;  1hr    INJECTION FOR SENTINEL NODE IDENTIFICATION Right 10/29/2020    Procedure: INJECTION, FOR SENTINEL NODE IDENTIFICATION;  Surgeon: Baylee Kevin MD;  Location: [address];  Service: General;  Laterality: Right;    INJECTION OF JOINT Right 10/10/2019    Procedure: Injection, Joint RIGHT ILIOPSOAS BURSA/TENDON INJECTION AND RIGHT GLUTEAL TENDON INJECTION WITH STEROID AND LIDOCAINE;  Surgeon: Guillaume Rico MD;  Location: [facility];  Service: Pain Management;  Laterality: Right;  NEEDS CONSENT    INSERTION OF BREAST TISSUE EXPANDER Bilateral 10/29/2020    Procedure: INSERTION, TISSUE EXPANDER, BREAST;  Surgeon: Scottie Johnson MD;  [facility];  Service: Plastics;  Laterality: Bilateral;  Right breast: 1082 g  Left breast: 1076 g    LIPOSUCTION Bilateral 2/11/2021    Procedure: LIPOSUCTION;  Surgeon: Scottie Johnson MD;  [facility];  Service: Plastics;  Laterality: Bilateral;    mediastenoscopy      REPLACEMENT OF IMPLANT OF BREAST  Bilateral 2/11/2021    Procedure: REPLACEMENT, IMPLANT, BREAST;  Surgeon: Scottie Johnson MD;  Location: [facility] OR 2ND FLR;  Service: Plastics;  Laterality: Bilateral;    SENTINEL LYMPH NODE BIOPSY Right 10/29/2020    Procedure: BIOPSY, LYMPH NODE, SENTINEL;  Surgeon: Baylee Kevin MD;  Location: [facility] OR 2ND FLR;  Service: General;  Laterality: Right;    TONSILLECTOMY N/A 1970    TUBAL LIGATION       Family History   Problem Relation Age of Onset    Heart attack Father     Diabetes Father     Hypertension Father     Diabetes Mother     Hypertension Mother     Breast cancer Maternal Aunt     Colon cancer Maternal Uncle     Breast cancer Daughter     Ovarian cancer Neg Hx     Cancer Neg Hx      Social History     Tobacco Use    Smoking status: Every Day     Current packs/day: 0.00     Average packs/day: 0.5 packs/day for 30.0 years (15.0 ttl pk-yrs)     Types: Vaping with nicotine, Cigarettes     Start date: 2/1/1991     Last attempt to quit: 2/1/2021     Years since quitting: 3.0    Smokeless tobacco: Never    Tobacco comments:     Patient is currently smoking 10 cigarettes a day, declines nicotine patches   Substance Use Topics    Alcohol use: Yes     Comment: vodka daily (half a regular bottle) for 4 days    Drug use: Yes     Types: Marijuana     Comment: gummies     Review of Systems    Physical Exam     Initial Vitals [02/16/24 0535]   BP Pulse Resp Temp SpO2   (!) 160/74 85 (!) 24 97.9 °F (36.6 °C) (!) 93 %      MAP       --         Physical Exam    Nursing note and vitals reviewed.  Constitutional: She appears well-developed and well-nourished. She is not diaphoretic. No distress.   HENT:   Head: Normocephalic and atraumatic.   Eyes: Right eye exhibits no discharge. Left eye exhibits no discharge. No scleral icterus.   Neck: Neck supple. No JVD present.   No meningeal signs   Normal range of motion.  Cardiovascular:  Normal rate, regular rhythm and normal heart sounds.     Exam reveals no gallop and no  friction rub.       No murmur heard.  Pulmonary/Chest: Breath sounds normal. No respiratory distress. She has no wheezes. She has no rhonchi. She has no rales.   Abdominal: Abdomen is soft. She exhibits no distension and no mass. There is no abdominal tenderness. There is no rebound and no guarding.   Musculoskeletal:         General: No tenderness or edema.      Cervical back: Normal range of motion and neck supple.      Comments: Soft compartments throughout but generalized tenderness palpation throughout musculature without any discrete area bony tenderness  Active range of motion of left hand limited     Neurological: She is alert. She has normal strength. No sensory deficit.   Impaired mentation, uncertain of recent events   Skin: Skin is warm and dry. Capillary refill takes less than 2 seconds.   Healing abrasions to posterior aspect of left wrist   Psychiatric: She exhibits abnormal recent memory.         ED Course   Procedures  Labs Reviewed   INFLUENZA A & B BY MOLECULAR   SARS-COV-2 RNA AMPLIFICATION, QUAL   ALCOHOL,MEDICAL (ETHANOL)   CK   CBC W/ AUTO DIFFERENTIAL   COMPREHENSIVE METABOLIC PANEL   TROPONIN I   URINALYSIS, REFLEX TO URINE CULTURE   POCT TROPONIN     EKG Readings: (Independently Interpreted)   Initial Reading: No STEMI. Rhythm: Normal Sinus Rhythm. Heart Rate: 84. ST Segments: Normal ST Segments. T Waves: Normal. Axis: Normal.       Imaging Results    None          Medications   lactated ringers bolus 1,000 mL (1,000 mLs Intravenous New Bag 2/16/24 0650)   acetaminophen tablet 650 mg (650 mg Oral Given 2/16/24 0618)   ketorolac injection 9.999 mg (9.999 mg Intravenous Given 2/16/24 0653)     Medical Decision Making  64 yo W with extensive pmhx including EtOH use disorder with severe withdrawals, CLL not currently on treatment, COPD, HTN, HLD, polysubstance abuse, depression, sarcoidosis, PRES, alcohol dependence, R breast CA s/p bilat mastectomy, recent hospitalization for rhabdomyolysis in  the setting of heavy ethanol use with left arm injury presents with a chief complaint of generalized myalgias, headache.    Differential includes, but is not limited to:  Rhabdo, alcohol intoxication, influenza, COVID, ICH, ACS    Will administer IV fluids, acetaminophen, ketorolac.  Will obtain labs and CT head.    Reassessment:  COVID, influenza negative.  At this time, my shift is coming to a close and the patient was signed out to incoming MD pending remainder of labs and reassessment.    Amount and/or Complexity of Data Reviewed  Labs: ordered.  Radiology: ordered.    Risk  OTC drugs.  Prescription drug management.                                      Clinical Impression:  Final diagnoses:  [M79.10] Myalgia (Primary)                 Jimbo Head MD  02/16/24 0656       Jimbo Head MD  02/16/24 0658

## 2024-02-16 NOTE — PROVIDER PROGRESS NOTES - EMERGENCY DEPT.
Encounter Date: 2/16/2024    ED Physician Progress Notes          STAFF PHYSICIAN F/U NOTE:  Earl Abdul has been evaluated and treated by Dr. Head. She reports much improvement/complete resolution of Sx and is ready to return home.   Imaging Results              X-Ray Chest AP Portable (Final result)  Result time 02/16/24 09:54:31      Final result by Jose Cosby Jr., MD (02/16/24 09:54:31)                   Impression:      No convincing cardiopulmonary abnormality identified.  Radiograph is under penetrated.      Electronically signed by: Jose Cosby MD  Date:    02/16/2024  Time:    09:54               Narrative:    EXAMINATION:  XR CHEST AP PORTABLE    CLINICAL HISTORY:  Somnolence    TECHNIQUE:  Single frontal view of the chest was performed.    COMPARISON:  November 23, 2023    FINDINGS:  Monitoring leads are in place.  Heart size and pulmonary vessels are similar.  Radiograph is under penetrated.                                       CT Head Without Contrast (Final result)  Result time 02/16/24 08:13:20      Final result by Tyson Bob MD (02/16/24 08:13:20)                   Impression:      No acute intracranial process.      Electronically signed by: Tyson Bob  Date:    02/16/2024  Time:    08:13               Narrative:    EXAMINATION:  CT HEAD WITHOUT CONTRAST    CLINICAL HISTORY:  Headache, sudden, severe;    TECHNIQUE:  Low dose axial images were obtained through the head.  Coronal and sagittal reformations were also performed. Contrast was not administered.    COMPARISON:  02/10/2024    FINDINGS:  No evidence of hydrocephalus, mass effect, intracranial hemorrhage or acute territorial infarct.    The brain parenchyma maintains normal attenuation    The calvarium is intact. The visualized sinuses and mastoid air cells are clear.                                    Currently patient reports no new Sx and is tolerating PO challenge.     Vitals:    02/16/24 0535 02/16/24 0555  02/16/24 0726 02/16/24 1013   BP: (!) 160/74 (!) 150/71 (!) 147/66 138/69   BP Location:  Right arm  Left arm   Patient Position:  Lying  Sitting   Pulse: 85 82 79 83   Resp: (!) 24 20 16 17   Temp: 97.9 °F (36.6 °C) 98.1 °F (36.7 °C) 98 °F (36.7 °C)    TempSrc: Oral Oral     SpO2: (!) 93% 95% (!) 91%  Comment: hx of COPD 96%   Weight: 86.2 kg (190 lb 0.6 oz)        She denies shortness of breath, chest pain, or any new symptoms or complaints.  Will treat her cystitis with outpatient antibiotics.  We discussed Sx warranting immediate ED return, which were acknowledged. I recommended F/U and discussion of ED visit with primary care physician.  ____________________  Rolando Bliss MD, [facility]  Emergency Medicine Staff  10:18 AM 2/16/2024    F/U:  Canceled antibiotic and diagnosis of cystitis as patient does not have any symptoms or signs that would yield cystitis or need of antibiotic treatment at this time.  Discussed symptoms warranting ED return which she acknowledged.  Discussed smoking cessation which was acknowledged.  ____________________  Rolando Bliss MD, [facility]  Emergency Medicine Staff  10:26 AM 2/16/2024

## 2024-02-16 NOTE — DISCHARGE INSTRUCTIONS
Future Appointments   Date Time Provider Department Center   2/22/2024  3:00 PM Andrew Rodriguez MD [facility] IM [facility]   5/16/2024  9:00 AM [facility] LAB [facility] [place] [facility]   5/16/2024 10:00 AM Miguel Montano MD [facility] B[facility] [place] [facility]       Imaging Results              X-Ray Chest AP Portable (Final result)  Result time 02/16/24 09:54:31      Final result by Jose Cosby Jr., MD (02/16/24 09:54:31)                   Impression:      No convincing cardiopulmonary abnormality identified.  Radiograph is under penetrated.      Electronically signed by: Jose Cosby MD  Date:    02/16/2024  Time:    09:54               Narrative:    EXAMINATION:  XR CHEST AP PORTABLE    CLINICAL HISTORY:  Somnolence    TECHNIQUE:  Single frontal view of the chest was performed.    COMPARISON:  November 23, 2023    FINDINGS:  Monitoring leads are in place.  Heart size and pulmonary vessels are similar.  Radiograph is under penetrated.                                       CT Head Without Contrast (Final result)  Result time 02/16/24 08:13:20      Final result by Tyson Bob MD (02/16/24 08:13:20)                   Impression:      No acute intracranial process.      Electronically signed by: Tyson Bob  Date:    02/16/2024  Time:    08:13               Narrative:    EXAMINATION:  CT HEAD WITHOUT CONTRAST    CLINICAL HISTORY:  Headache, sudden, severe;    TECHNIQUE:  Low dose axial images were obtained through the head.  Coronal and sagittal reformations were also performed. Contrast was not administered.    COMPARISON:  02/10/2024    FINDINGS:  No evidence of hydrocephalus, mass effect, intracranial hemorrhage or acute territorial infarct.    The brain parenchyma maintains normal attenuation    The calvarium is intact. The visualized sinuses and mastoid air cells are clear.

## 2024-02-16 NOTE — PLAN OF CARE
As per Baylee with [facility]; pt is current on their service      Haydee Casanova, DUTCH, MSW, LMSW, RSW   Case Management  [facility]  Email: [email or web address]

## 2024-02-17 LAB — BACTERIA UR CULT: NO GROWTH

## 2024-02-19 LAB — BACTERIA SPEC ANAEROBE CULT: NORMAL

## 2024-02-20 ENCOUNTER — HOSPITAL ENCOUNTER (EMERGENCY)
Facility: HOSPITAL | Age: 64
Discharge: LEFT BEFORE DISCHARGE | End: 2024-02-20
Attending: EMERGENCY MEDICINE
Payer: COMMERCIAL

## 2024-02-20 VITALS
WEIGHT: 190 LBS | TEMPERATURE: 99 F | RESPIRATION RATE: 15 BRPM | BODY MASS INDEX: 30.53 KG/M2 | OXYGEN SATURATION: 96 % | SYSTOLIC BLOOD PRESSURE: 180 MMHG | DIASTOLIC BLOOD PRESSURE: 80 MMHG | HEART RATE: 88 BPM | HEIGHT: 66 IN

## 2024-02-20 DIAGNOSIS — R10.84 GENERALIZED ABDOMINAL PAIN: ICD-10-CM

## 2024-02-20 DIAGNOSIS — R52 BODY ACHES: ICD-10-CM

## 2024-02-20 DIAGNOSIS — R53.81 MALAISE: Primary | ICD-10-CM

## 2024-02-20 LAB
ALBUMIN SERPL BCP-MCNC: 3.6 G/DL (ref 3.5–5.2)
ALP SERPL-CCNC: 47 U/L (ref 55–135)
ALT SERPL W/O P-5'-P-CCNC: 54 U/L (ref 10–44)
ANION GAP SERPL CALC-SCNC: 8 MMOL/L (ref 8–16)
AST SERPL-CCNC: 29 U/L (ref 10–40)
BACTERIA #/AREA URNS AUTO: NORMAL /HPF
BASOPHILS # BLD AUTO: 0.05 K/UL (ref 0–0.2)
BASOPHILS NFR BLD: 0.6 % (ref 0–1.9)
BILIRUB SERPL-MCNC: 0.3 MG/DL (ref 0.1–1)
BILIRUB UR QL STRIP: NEGATIVE
BUN SERPL-MCNC: 10 MG/DL (ref 8–23)
BUN SERPL-MCNC: 9 MG/DL (ref 6–30)
CALCIUM SERPL-MCNC: 9.3 MG/DL (ref 8.7–10.5)
CHLORIDE SERPL-SCNC: 104 MMOL/L (ref 95–110)
CHLORIDE SERPL-SCNC: 108 MMOL/L (ref 95–110)
CLARITY UR REFRACT.AUTO: CLEAR
CO2 SERPL-SCNC: 25 MMOL/L (ref 23–29)
COLOR UR AUTO: ABNORMAL
CREAT SERPL-MCNC: 0.8 MG/DL (ref 0.5–1.4)
CREAT SERPL-MCNC: 1 MG/DL (ref 0.5–1.4)
DIFFERENTIAL METHOD BLD: ABNORMAL
EOSINOPHIL # BLD AUTO: 0.1 K/UL (ref 0–0.5)
EOSINOPHIL NFR BLD: 0.8 % (ref 0–8)
ERYTHROCYTE [DISTWIDTH] IN BLOOD BY AUTOMATED COUNT: 17.1 % (ref 11.5–14.5)
EST. GFR  (NO RACE VARIABLE): >60 ML/MIN/1.73 M^2
GLUCOSE SERPL-MCNC: 81 MG/DL (ref 70–110)
GLUCOSE SERPL-MCNC: 88 MG/DL (ref 70–110)
GLUCOSE UR QL STRIP: NEGATIVE
HCT VFR BLD AUTO: 31.3 % (ref 37–48.5)
HCT VFR BLD CALC: 30 %PCV (ref 36–54)
HGB BLD-MCNC: 9.3 G/DL (ref 12–16)
HGB UR QL STRIP: ABNORMAL
IMM GRANULOCYTES # BLD AUTO: 0.05 K/UL (ref 0–0.04)
IMM GRANULOCYTES NFR BLD AUTO: 0.6 % (ref 0–0.5)
INFLUENZA A, MOLECULAR: NEGATIVE
INFLUENZA B, MOLECULAR: NEGATIVE
KETONES UR QL STRIP: NEGATIVE
LEUKOCYTE ESTERASE UR QL STRIP: ABNORMAL
LIPASE SERPL-CCNC: 18 U/L (ref 4–60)
LYMPHOCYTES # BLD AUTO: 4.4 K/UL (ref 1–4.8)
LYMPHOCYTES NFR BLD: 56.9 % (ref 18–48)
MCH RBC QN AUTO: 25.5 PG (ref 27–31)
MCHC RBC AUTO-ENTMCNC: 29.7 G/DL (ref 32–36)
MCV RBC AUTO: 86 FL (ref 82–98)
MICROSCOPIC COMMENT: NORMAL
MONOCYTES # BLD AUTO: 0.5 K/UL (ref 0.3–1)
MONOCYTES NFR BLD: 6.8 % (ref 4–15)
NEUTROPHILS # BLD AUTO: 2.7 K/UL (ref 1.8–7.7)
NEUTROPHILS NFR BLD: 34.3 % (ref 38–73)
NITRITE UR QL STRIP: NEGATIVE
NRBC BLD-RTO: 0 /100 WBC
OHS QRS DURATION: 76 MS
OHS QTC CALCULATION: 418 MS
PH UR STRIP: 7 [PH] (ref 5–8)
PLATELET # BLD AUTO: 161 K/UL (ref 150–450)
PMV BLD AUTO: 10.3 FL (ref 9.2–12.9)
POC IONIZED CALCIUM: 1.07 MMOL/L (ref 1.06–1.42)
POC TCO2 (MEASURED): 24 MMOL/L (ref 23–27)
POTASSIUM BLD-SCNC: 4.1 MMOL/L (ref 3.5–5.1)
POTASSIUM SERPL-SCNC: 4.2 MMOL/L (ref 3.5–5.1)
PROT SERPL-MCNC: 6.2 G/DL (ref 6–8.4)
PROT UR QL STRIP: NEGATIVE
RBC # BLD AUTO: 3.64 M/UL (ref 4–5.4)
RBC #/AREA URNS AUTO: 3 /HPF (ref 0–4)
SAMPLE: ABNORMAL
SARS-COV-2 RDRP RESP QL NAA+PROBE: NEGATIVE
SODIUM BLD-SCNC: 141 MMOL/L (ref 136–145)
SODIUM SERPL-SCNC: 141 MMOL/L (ref 136–145)
SP GR UR STRIP: 1.02 (ref 1–1.03)
SPECIMEN SOURCE: NORMAL
SQUAMOUS #/AREA URNS AUTO: 1 /HPF
URN SPEC COLLECT METH UR: ABNORMAL
WBC # BLD AUTO: 7.8 K/UL (ref 3.9–12.7)
WBC #/AREA URNS AUTO: 2 /HPF (ref 0–5)

## 2024-02-20 PROCEDURE — 87502 INFLUENZA DNA AMP PROBE: CPT | Performed by: PHYSICIAN ASSISTANT

## 2024-02-20 PROCEDURE — 80048 BASIC METABOLIC PNL TOTAL CA: CPT | Mod: XB

## 2024-02-20 PROCEDURE — U0002 COVID-19 LAB TEST NON-CDC: HCPCS | Performed by: PHYSICIAN ASSISTANT

## 2024-02-20 PROCEDURE — 96375 TX/PRO/DX INJ NEW DRUG ADDON: CPT

## 2024-02-20 PROCEDURE — 96365 THER/PROPH/DIAG IV INF INIT: CPT

## 2024-02-20 PROCEDURE — 83690 ASSAY OF LIPASE: CPT | Performed by: PHYSICIAN ASSISTANT

## 2024-02-20 PROCEDURE — 93005 ELECTROCARDIOGRAM TRACING: CPT

## 2024-02-20 PROCEDURE — 93010 ELECTROCARDIOGRAM REPORT: CPT | Mod: ,,, | Performed by: INTERNAL MEDICINE

## 2024-02-20 PROCEDURE — 25000003 PHARM REV CODE 250: Performed by: PHYSICIAN ASSISTANT

## 2024-02-20 PROCEDURE — 85025 COMPLETE CBC W/AUTO DIFF WBC: CPT | Performed by: PHYSICIAN ASSISTANT

## 2024-02-20 PROCEDURE — 80053 COMPREHEN METABOLIC PANEL: CPT | Performed by: PHYSICIAN ASSISTANT

## 2024-02-20 PROCEDURE — 99285 EMERGENCY DEPT VISIT HI MDM: CPT | Mod: 25

## 2024-02-20 PROCEDURE — 96361 HYDRATE IV INFUSION ADD-ON: CPT

## 2024-02-20 PROCEDURE — 81001 URINALYSIS AUTO W/SCOPE: CPT | Performed by: PHYSICIAN ASSISTANT

## 2024-02-20 PROCEDURE — 63600175 PHARM REV CODE 636 W HCPCS: Performed by: PHYSICIAN ASSISTANT

## 2024-02-20 PROCEDURE — 25500020 PHARM REV CODE 255: Performed by: EMERGENCY MEDICINE

## 2024-02-20 PROCEDURE — 96366 THER/PROPH/DIAG IV INF ADDON: CPT

## 2024-02-20 RX ORDER — MAGNESIUM SULFATE HEPTAHYDRATE 40 MG/ML
2 INJECTION, SOLUTION INTRAVENOUS ONCE
Status: COMPLETED | OUTPATIENT
Start: 2024-02-20 | End: 2024-02-20

## 2024-02-20 RX ORDER — DROPERIDOL 2.5 MG/ML
1.25 INJECTION, SOLUTION INTRAMUSCULAR; INTRAVENOUS ONCE
Status: COMPLETED | OUTPATIENT
Start: 2024-02-20 | End: 2024-02-20

## 2024-02-20 RX ORDER — METOCLOPRAMIDE HYDROCHLORIDE 5 MG/ML
10 INJECTION INTRAMUSCULAR; INTRAVENOUS
Status: COMPLETED | OUTPATIENT
Start: 2024-02-20 | End: 2024-02-20

## 2024-02-20 RX ORDER — ACETAMINOPHEN 500 MG
1000 TABLET ORAL
Status: COMPLETED | OUTPATIENT
Start: 2024-02-20 | End: 2024-02-20

## 2024-02-20 RX ADMIN — MAGNESIUM SULFATE HEPTAHYDRATE 2 G: 40 INJECTION, SOLUTION INTRAVENOUS at 04:02

## 2024-02-20 RX ADMIN — DROPERIDOL 1.25 MG: 2.5 INJECTION, SOLUTION INTRAMUSCULAR; INTRAVENOUS at 04:02

## 2024-02-20 RX ADMIN — IOHEXOL 100 ML: 350 INJECTION, SOLUTION INTRAVENOUS at 03:02

## 2024-02-20 RX ADMIN — ACETAMINOPHEN 1000 MG: 500 TABLET ORAL at 02:02

## 2024-02-20 RX ADMIN — METOCLOPRAMIDE 10 MG: 5 INJECTION, SOLUTION INTRAMUSCULAR; INTRAVENOUS at 02:02

## 2024-02-20 RX ADMIN — SODIUM CHLORIDE, POTASSIUM CHLORIDE, SODIUM LACTATE AND CALCIUM CHLORIDE 1000 ML: 600; 310; 30; 20 INJECTION, SOLUTION INTRAVENOUS at 02:02

## 2024-02-20 NOTE — ED NOTES
I-STAT Chem-8+ Results:   Value Reference Range   Sodium 141 136-145 mmol/L   Potassium  4.1 3.5-5.1 mmol/L   Chloride 104  mmol/L   Ionized Calcium 1.07 1.06-1.42 mmol/L   CO2 (measured) 24 23-29 mmol/L   Glucose 88  mg/dL   BUN 9 6-30 mg/dL   Creatinine 1.0 0.5-1.4 mg/dL   Hematocrit 30 36-54%

## 2024-02-20 NOTE — ED NOTES
Pt identifiers Earl Abdul were checked and are correct  LOC: The patient is awake, alert, aware of environment with an appropriate affect. Oriented x4, speaking appropriately  APPEARANCE: Pt rates left side and headache a 10/10 , in no acute distress, pt is clean and well groomed, clothing properly fastened  SKIN: Skin warm, dry and intact, normal skin turgor, moist mucus membranes Wounds with dry scabs noted to left forearm and right forearm   RESPIRATORY: Airway is open and patent, respirations are spontaneous, even and unlabored, normal effort and rate  CARDIAC: Normal rate and rhythm, no peripheral edema noted, capillary refill < 3 seconds, bilateral radial pulses 2+  ABDOMEN: Soft, nontender, nondistended. Bowel sounds present   NEUROLOGIC: PERRL, facial expression is symmetrical, patient moving all extremities spontaneously, normal sensation in all extremities when touched with a finger.  Follows all commands appropriately  MUSCULOSKELETAL: No obvious deformities.

## 2024-02-20 NOTE — ED PROVIDER NOTES
Encounter Date: 2/20/2024       History     Chief Complaint   Patient presents with    General Illness     Flu-like symptoms with cough x 3 days. Hx Leukemia. On home 2L NC. Took zofran and tylenol at home       64 y/o F with hx of EtOH use disorder c/b severe withdrawals, HTN, HLD, R breast cancer s/p bilateral mastectomy previously on Letrozole, CLL not currently on any tx, IDDM2, depression, PUD, press syndrome, sarcoidosis of the lung who presents emergency department for generalized body aches, generalized abdominal pain.  Patient denies any sore throat, runny nose, cough or fevers/chills.  Reports diffuse abdominal discomfort with no focal pain.  States she has had difficulty eating or drinking for the past 3 days due to nausea vomiting.  No chest pain or shortness breath.  Denies any urinary symptoms, constipation or diarrhea.  Has a frontal gradual onset headache for the past 3 days.  States it is similar to previous headaches she has had in the past.    The history is provided by the patient.     Review of patient's allergies indicates:   Allergen Reactions    Lortab [hydrocodone-acetaminophen] Itching    Promethazine Itching and Other (See Comments)     Past Medical History:   Diagnosis Date    Alcoholism     c/b alcohol withdrawl seizures 7/2017    Anemia     Cancer of breast 10/2020    s/p bilateral mastectomy for  T1b N0 stage IA breast cancer October 2020    CLL (chronic lymphocytic leukemia) 09/30/2022 6/22/22 - PB flow cytometry  Immunophenotyping of peripheral blood detects a distinct kappa light chain restricted monoclonal B-cell population  (calculated at 2.44x10 9 /L, from the most recent CBC showing a total WBC of  7.35 K/uL with 61% total lymphocytes)  with a CLL phenotype (coexpression of CD19, CD5, CD23 and dim CD20). CD22 (FITC), FMC-7 and CD38 are negative in this population.    Controlled type 2 diabetes mellitus without complication, without long-term current use of insulin 11/30/2021     COPD (chronic obstructive pulmonary disease)     Depression     Diverticulitis     Fatty liver     GERD (gastroesophageal reflux disease)     Hyperlipidemia     Hypertension     Pancreatitis     Peptic ulcer disease     Polysubstance abuse     Posterior reversible encephalopathy syndrome     Sarcoidosis of lung     over 30 yrs ago    Suicide attempt      Past Surgical History:   Procedure Laterality Date    APPENDECTOMY      BILATERAL MASTECTOMY Bilateral 10/29/2020    Procedure: MASTECTOMY, BILATERAL;  Surgeon: Baylee Kevin MD;  Location: [address];  Service: General;  Laterality: Bilateral;    BREAST REVISION SURGERY Bilateral 2/11/2021    Procedure: BREAST REVISION SURGERY;  Surgeon: Scottie Johnson MD;  Location: [address];  Service: Plastics;  Laterality: Bilateral;    COLONOSCOPY N/A 7/28/2017    Procedure: COLONOSCOPY;  Surgeon: Aaron Alvarado MD;  Location: [facility];  Service: Endoscopy;  Laterality: N/A;    ESOPHAGOGASTRODUODENOSCOPY  10/7/2016, 11/6/2014    2016 - gastritis, duodenitis, 2014 erosive gastritis    ESOPHAGOGASTRODUODENOSCOPY N/A 2/11/2020    Procedure: ESOPHAGOGASTRODUODENOSCOPY (EGD);  Surgeon: Fawn Garrido MD;  Location: [facility];  Service: Endoscopy;  Laterality: N/A;    ESOPHAGOGASTRODUODENOSCOPY N/A 4/19/2021    Procedure: EGD (ESOPHAGOGASTRODUODENOSCOPY);  Surgeon: Paramjit Martino MD;  Location: [facility];  Service: Endoscopy;  Laterality: N/A;    FLEXIBLE SIGMOIDOSCOPY  11/06/2014    colitis    HYSTERECTOMY      IMPLANTATION OF PERMANENT SACRAL NERVE STIMULATOR N/A 7/12/2022    Procedure: INSERTION, NEUROSTIMULATOR, PERMANENT, SACRAL;  Surgeon: Juaquin Edwards MD;  Location: [address];  Service: Urology;  Laterality: N/A;  1hr    INJECTION FOR SENTINEL NODE IDENTIFICATION Right 10/29/2020    Procedure: INJECTION, FOR SENTINEL NODE IDENTIFICATION;  Surgeon: Baylee Kevin MD;  Location: [address];  Service: General;  Laterality:  Right;    INJECTION OF JOINT Right 10/10/2019    Procedure: Injection, Joint RIGHT ILIOPSOAS BURSA/TENDON INJECTION AND RIGHT GLUTEAL TENDON INJECTION WITH STEROID AND LIDOCAINE;  Surgeon: Guillaume Rico MD;  Location: [facility] [place] [facility];  Service: Pain Management;  Laterality: Right;  NEEDS CONSENT    INSERTION OF BREAST TISSUE EXPANDER Bilateral 10/29/2020    Procedure: INSERTION, TISSUE EXPANDER, BREAST;  Surgeon: Scottie Johnson MD;  Location: [facility] OR [address] [place] [address];  Service: Plastics;  Laterality: Bilateral;  Right breast: 1082 g  Left breast: 1076 g    LIPOSUCTION Bilateral 2/11/2021    Procedure: LIPOSUCTION;  Surgeon: Scottie Johnson MD;  Location: [facility] OR [address] [place] [address];  Service: Plastics;  Laterality: Bilateral;    mediastenoscopy      REPLACEMENT OF IMPLANT OF BREAST Bilateral 2/11/2021    Procedure: REPLACEMENT, IMPLANT, BREAST;  Surgeon: Scottie Johnson MD;  Location: [facility] OR [address] [place] [address];  Service: Plastics;  Laterality: Bilateral;    SENTINEL LYMPH NODE BIOPSY Right 10/29/2020    Procedure: BIOPSY, LYMPH NODE, SENTINEL;  Surgeon: Baylee Kevin MD;  Location: [address];  Service: General;  Laterality: Right;    TONSILLECTOMY N/A 1970    TUBAL LIGATION       Family History   Problem Relation Age of Onset    Heart attack Father     Diabetes Father     Hypertension Father     Diabetes Mother     Hypertension Mother     Breast cancer Maternal Aunt     Colon cancer Maternal Uncle     Breast cancer Daughter     Ovarian cancer Neg Hx     Cancer Neg Hx      Social History     Tobacco Use    Smoking status: Every Day     Current packs/day: 0.00     Average packs/day: 0.5 packs/day for 30.0 years (15.0 ttl pk-yrs)     Types: Vaping with nicotine, Cigarettes     Start date: 2/1/1991     Last attempt to quit: 2/1/2021     Years since quitting: 3.0    Smokeless tobacco: Never    Tobacco comments:     Patient is currently smoking 10 cigarettes a day, declines nicotine patches   Substance Use Topics    Alcohol  use: Yes     Comment: vodka daily (half a regular bottle) for 4 days    Drug use: Yes     Types: Marijuana     Comment: gummies     Review of Systems   Constitutional:  Positive for fatigue. Negative for chills and fever.   HENT:  Negative for sore throat.    Respiratory:  Negative for cough and shortness of breath.    Cardiovascular:  Negative for chest pain.   Gastrointestinal:  Positive for abdominal pain, nausea and vomiting.   Genitourinary:  Negative for difficulty urinating and dysuria.   Musculoskeletal:  Positive for myalgias.   Skin: Negative.    Neurological:  Positive for headaches.   Psychiatric/Behavioral:  Negative for confusion.        Physical Exam     Initial Vitals [02/20/24 1115]   BP Pulse Resp Temp SpO2   (!) 153/84 85 18 98.7 °F (37.1 °C) 99 %      MAP       --         Physical Exam    Nursing note and vitals reviewed.  Constitutional: She appears well-developed and well-nourished.   HENT:   Head: Normocephalic and atraumatic.   Eyes: Conjunctivae are normal.   Neck: Neck supple.   Normal range of motion.  Cardiovascular:  Normal rate.           Pulmonary/Chest: Breath sounds normal.   Abdominal: Abdomen is soft. She exhibits no distension and no mass. There is abdominal tenderness (Minimal generalized with no focal tenderness). There is no rebound and no guarding.   Musculoskeletal:         General: Normal range of motion.      Cervical back: Normal range of motion and neck supple.     Neurological: She is alert and oriented to person, place, and time. GCS score is 15. GCS eye subscore is 4. GCS verbal subscore is 5. GCS motor subscore is 6.   Skin: Skin is warm and dry. Capillary refill takes less than 2 seconds.         ED Course   Procedures  Labs Reviewed   CBC W/ AUTO DIFFERENTIAL - Abnormal; Notable for the following components:       Result Value    RBC 3.64 (*)     Hemoglobin 9.3 (*)     Hematocrit 31.3 (*)     MCH 25.5 (*)     MCHC 29.7 (*)     RDW 17.1 (*)     Immature Granulocytes  0.6 (*)     Immature Grans (Abs) 0.05 (*)     Gran % 34.3 (*)     Lymph % 56.9 (*)     All other components within normal limits   COMPREHENSIVE METABOLIC PANEL - Abnormal; Notable for the following components:    Alkaline Phosphatase 47 (*)     ALT 54 (*)     All other components within normal limits   ISTAT PROCEDURE - Abnormal; Notable for the following components:    POC Hematocrit 30 (*)     All other components within normal limits   INFLUENZA A & B BY MOLECULAR   LIPASE   SARS-COV-2 RNA AMPLIFICATION, QUAL   URINALYSIS, REFLEX TO URINE CULTURE   URINALYSIS MICROSCOPIC   ISTAT CHEM8          Imaging Results              CT Abdomen Pelvis With IV Contrast NO Oral Contrast (Final result)  Result time 02/20/24 16:46:03      Final result by Ioana Brock MD (02/20/24 16:46:03)                   Impression:      Subtle micronodular contour of the liver concerning for underlying chronic liver disease.    Mild splenomegaly.    Enlarged lymph nodes in the peripancreatic region and in the bilateral iliac chain region concerning for possible malignancy, it is unchanged from the prior study.    New, subcutaneous soft tissue edema left lateral flank region, could represent dependent edema, to correlate clinically.      Electronically signed by: Ioana Brock MD  Date:    02/20/2024  Time:    16:46               Narrative:    EXAMINATION:  CT ABDOMEN PELVIS WITH IV CONTRAST    CLINICAL HISTORY:  Abdominal pain, acute, nonlocalized;    TECHNIQUE:  Low dose axial images, sagittal and coronal reformations were obtained from the lung bases to the pubic symphysis following the IV administration of 100 mL of Omnipaque 350 .  Oral contrast was not given. Axial and coronal images reformatted.    COMPARISON:  02/10/2024    FINDINGS:  The lung bases are clear.  No pericardial effusion.    The liver is normal in size.  Micronodular contour of the liver can be seen with chronic liver disease.  No liver lesions.    The  biliary ducts are not dilated.  The gallbladder is present, no radiopaque stones seen within.    The distal esophagus and stomach appear normal.    The pancreas appears normal.  Peripancreatic enlarged nodes, similar to the prior exam.    The spleen is mildly enlarged measuring 14.6 x 7.8 cm.  No collateral vessels noted.    The bilateral adrenal glands and bilateral kidneys appear normal.  No hydronephrosis.  The bilateral ureters appear normal.  The bladder is nondistended.  The uterus is is removed.  The ovaries appear normal.    The abdominal aorta tapers normally, there is circumferential calcified atherosclerotic plaque.  Abnormal left iliac chain enlarged node, an index node in the left iliac region 2.7 x 3.2 cm (2:145), an index node in the left right iliac chain region 1.9 x 2.9 cm (2:149).    The anterior abdominal wall is intact.  The inguinal regions appear normal.    Is mild stool retention, no inflammatory changes of the bowel seen, no bowel dilation.  The mesentery appears within normal limits, no ascites, no free air.    The osseous structures demonstrate postoperative changes at the lumbar spine with L4 and L5 fusion, there is a neurostimulator device in the right S3 neural foramina.  There is advanced degenerative disc disease and adjacent endplate sclerosis at L2-L3.  No osseous lesion seen.                                       X-Ray Chest AP Portable (Final result)  Result time 02/20/24 13:52:09      Final result by Shon Boles MD (02/20/24 13:52:09)                   Impression:      1. No convincing acute cardiopulmonary process, no large focal consolidation.      Electronically signed by: Shon Boles MD  Date:    02/20/2024  Time:    13:52               Narrative:    EXAMINATION:  XR CHEST AP PORTABLE    CLINICAL HISTORY:  body aches;    TECHNIQUE:  Single frontal view of the chest was performed.    COMPARISON:  02/16/2024    FINDINGS:  The cardiomediastinal silhouette is not  enlarged noting calcification of the aorta..  There is obscuration of the left costophrenic angle, likely related to overlying habitus rather than pleural effusion..  The trachea is midline.  The lungs are symmetrically expanded bilaterally with mildly coarse interstitial attenuation accentuated by habitus..  No large focal consolidation seen.  There is no pneumothorax.  The osseous structures are remarkable for degenerative change.  Surgical changes are noted of the chest wall..                                       Medications   magnesium sulfate 2g in water 50mL IVPB (premix) (2 g Intravenous New Bag 2/20/24 1642)   acetaminophen tablet 1,000 mg (1,000 mg Oral Given 2/20/24 1415)   lactated ringers bolus 1,000 mL (0 mLs Intravenous Stopped 2/20/24 1511)   metoclopramide injection 10 mg (10 mg Intravenous Given 2/20/24 1415)   iohexoL (OMNIPAQUE 350) injection 100 mL (100 mLs Intravenous Given 2/20/24 1550)   droPERidol injection 1.25 mg (1.25 mg Intravenous Given 2/20/24 1639)     Medical Decision Making  63-year-old female presents ED for headache generalized body aches, nausea vomiting, generalized abdominal discomfort and fatigue.      Differential includes but not limited to viral syndrome, COVID-19, influenza, dehydration, electrolyte derangement, ICH, CVA, migraine, UTI, pancreatitis, gastritis    Patient vital signs are reassuring.  Benign abdominal exam however she has immunocompromised with history of CLL CT abdomen pelvis were obtained which did not show any emergent pathology.  Reports gradual onset headaches similar to previous and was given a migraine cocktail in the ED.  Viral screening was negative for COVID and influenza.  No electrolyte derangement.  Pending UA however patient reports she is feeling better and was formed by the RN that she removed her IV and eloped from the ED.    Amount and/or Complexity of Data Reviewed  Labs: ordered. Decision-making details documented in ED  Course.  Radiology: ordered. Decision-making details documented in ED Course.    Risk  OTC drugs.  Prescription drug management.               ED Course as of 02/20/24 1718 Tue Feb 20, 2024   1427 SARS-CoV-2 RNA, Amplification, Qual: Negative  Negative for COVID-19 [HJ]   1428 Influenza A & B by Molecular  Negative for influenza [HJ]   1428 X-Ray Chest AP Portable  No acute cardiopulmonary abnormalities [HJ]   1511 WBC: 7.80  No leukocytosis [HJ]   1512 Comprehensive metabolic panel(!)  No electrolyte derangement, no DEVANTE, liver function slightly elevated but improved compared to previous [HJ]   1512 Lipase: 18  Doubt pancreatitis [HJ]      ED Course User Index  [HJ] Paris Weaver PA-C                             Clinical Impression:  Final diagnoses:  [R53.81] Malaise (Primary)  [R52] Body aches  [R10.84] Generalized abdominal pain          ED Disposition Condition    Eloped Stable                Paris Weaver PA-C  02/20/24 1718

## 2024-02-22 ENCOUNTER — OFFICE VISIT (OUTPATIENT)
Dept: INTERNAL MEDICINE | Facility: CLINIC | Age: 64
End: 2024-02-22
Attending: INTERNAL MEDICINE
Payer: COMMERCIAL

## 2024-02-22 ENCOUNTER — LAB VISIT (OUTPATIENT)
Dept: LAB | Facility: OTHER | Age: 64
End: 2024-02-22
Attending: INTERNAL MEDICINE
Payer: COMMERCIAL

## 2024-02-22 VITALS
BODY MASS INDEX: 32.42 KG/M2 | OXYGEN SATURATION: 89 % | SYSTOLIC BLOOD PRESSURE: 134 MMHG | DIASTOLIC BLOOD PRESSURE: 66 MMHG | HEART RATE: 115 BPM | WEIGHT: 201.75 LBS | HEIGHT: 66 IN

## 2024-02-22 DIAGNOSIS — G89.29 CHRONIC NONINTRACTABLE HEADACHE, UNSPECIFIED HEADACHE TYPE: ICD-10-CM

## 2024-02-22 DIAGNOSIS — C91.10 CLL (CHRONIC LYMPHOCYTIC LEUKEMIA): Chronic | ICD-10-CM

## 2024-02-22 DIAGNOSIS — R51.9 CHRONIC NONINTRACTABLE HEADACHE, UNSPECIFIED HEADACHE TYPE: ICD-10-CM

## 2024-02-22 DIAGNOSIS — R11.0 CHRONIC NAUSEA: Primary | ICD-10-CM

## 2024-02-22 DIAGNOSIS — F10.930 ALCOHOL WITHDRAWAL SYNDROME WITHOUT COMPLICATION: ICD-10-CM

## 2024-02-22 DIAGNOSIS — R11.0 CHRONIC NAUSEA: ICD-10-CM

## 2024-02-22 DIAGNOSIS — F10.151 ALCOHOL ABUSE WITH ALCOHOL-INDUCED PSYCHOTIC DISORDER WITH HALLUCINATIONS: ICD-10-CM

## 2024-02-22 LAB
ALBUMIN SERPL BCP-MCNC: 4.4 G/DL (ref 3.5–5.2)
ALP SERPL-CCNC: 45 U/L (ref 55–135)
ALT SERPL W/O P-5'-P-CCNC: 50 U/L (ref 10–44)
ANION GAP SERPL CALC-SCNC: 15 MMOL/L (ref 8–16)
AST SERPL-CCNC: 40 U/L (ref 10–40)
BILIRUB SERPL-MCNC: 0.3 MG/DL (ref 0.1–1)
BUN SERPL-MCNC: 10 MG/DL (ref 8–23)
CALCIUM SERPL-MCNC: 9.2 MG/DL (ref 8.7–10.5)
CHLORIDE SERPL-SCNC: 101 MMOL/L (ref 95–110)
CO2 SERPL-SCNC: 25 MMOL/L (ref 23–29)
CREAT SERPL-MCNC: 0.8 MG/DL (ref 0.5–1.4)
EST. GFR  (NO RACE VARIABLE): >60 ML/MIN/1.73 M^2
GLUCOSE SERPL-MCNC: 75 MG/DL (ref 70–110)
LDH SERPL L TO P-CCNC: 278 U/L (ref 110–260)
MAGNESIUM SERPL-MCNC: 1.5 MG/DL (ref 1.6–2.6)
POTASSIUM SERPL-SCNC: 4.2 MMOL/L (ref 3.5–5.1)
PROT SERPL-MCNC: 7.4 G/DL (ref 6–8.4)
SODIUM SERPL-SCNC: 141 MMOL/L (ref 136–145)
TSH SERPL DL<=0.005 MIU/L-ACNC: 0.65 UIU/ML (ref 0.4–4)
VIT B12 SERPL-MCNC: 528 PG/ML (ref 210–950)

## 2024-02-22 PROCEDURE — 1159F MED LIST DOCD IN RCRD: CPT | Mod: CPTII,S$GLB,, | Performed by: INTERNAL MEDICINE

## 2024-02-22 PROCEDURE — 99999 PR PBB SHADOW E&M-EST. PATIENT-LVL IV: CPT | Mod: PBBFAC,,, | Performed by: INTERNAL MEDICINE

## 2024-02-22 PROCEDURE — 3044F HG A1C LEVEL LT 7.0%: CPT | Mod: CPTII,S$GLB,, | Performed by: INTERNAL MEDICINE

## 2024-02-22 PROCEDURE — 3288F FALL RISK ASSESSMENT DOCD: CPT | Mod: CPTII,S$GLB,, | Performed by: INTERNAL MEDICINE

## 2024-02-22 PROCEDURE — 99214 OFFICE O/P EST MOD 30 MIN: CPT | Mod: S$GLB,,, | Performed by: INTERNAL MEDICINE

## 2024-02-22 PROCEDURE — 1101F PT FALLS ASSESS-DOCD LE1/YR: CPT | Mod: CPTII,S$GLB,, | Performed by: INTERNAL MEDICINE

## 2024-02-22 PROCEDURE — 83615 LACTATE (LD) (LDH) ENZYME: CPT | Performed by: INTERNAL MEDICINE

## 2024-02-22 PROCEDURE — 1111F DSCHRG MED/CURRENT MED MERGE: CPT | Mod: CPTII,S$GLB,, | Performed by: INTERNAL MEDICINE

## 2024-02-22 PROCEDURE — 36415 COLL VENOUS BLD VENIPUNCTURE: CPT | Performed by: INTERNAL MEDICINE

## 2024-02-22 PROCEDURE — 1125F AMNT PAIN NOTED PAIN PRSNT: CPT | Mod: CPTII,S$GLB,, | Performed by: INTERNAL MEDICINE

## 2024-02-22 PROCEDURE — 83735 ASSAY OF MAGNESIUM: CPT | Performed by: INTERNAL MEDICINE

## 2024-02-22 PROCEDURE — 84443 ASSAY THYROID STIM HORMONE: CPT | Performed by: INTERNAL MEDICINE

## 2024-02-22 PROCEDURE — 82607 VITAMIN B-12: CPT | Performed by: INTERNAL MEDICINE

## 2024-02-22 PROCEDURE — 80053 COMPREHEN METABOLIC PANEL: CPT | Performed by: INTERNAL MEDICINE

## 2024-02-22 PROCEDURE — 3075F SYST BP GE 130 - 139MM HG: CPT | Mod: CPTII,S$GLB,, | Performed by: INTERNAL MEDICINE

## 2024-02-22 PROCEDURE — 1160F RVW MEDS BY RX/DR IN RCRD: CPT | Mod: CPTII,S$GLB,, | Performed by: INTERNAL MEDICINE

## 2024-02-22 PROCEDURE — 3078F DIAST BP <80 MM HG: CPT | Mod: CPTII,S$GLB,, | Performed by: INTERNAL MEDICINE

## 2024-02-22 RX ORDER — ONDANSETRON HYDROCHLORIDE 8 MG/1
TABLET, FILM COATED ORAL
Status: ON HOLD | COMMUNITY
End: 2024-03-06 | Stop reason: HOSPADM

## 2024-02-22 RX ORDER — OMEPRAZOLE 20 MG/1
20 TABLET, DELAYED RELEASE ORAL DAILY
Status: ON HOLD | COMMUNITY
End: 2024-03-06

## 2024-02-22 RX ORDER — CHLORDIAZEPOXIDE HYDROCHLORIDE 10 MG/1
CAPSULE, GELATIN COATED ORAL
Qty: 42 CAPSULE | Refills: 0 | Status: ON HOLD | OUTPATIENT
Start: 2024-02-22 | End: 2024-03-06 | Stop reason: HOSPADM

## 2024-02-22 RX ORDER — SUCRALFATE 1 G/1
1 TABLET ORAL 4 TIMES DAILY
Qty: 60 TABLET | Refills: 1 | Status: SHIPPED | OUTPATIENT
Start: 2024-02-22 | End: 2024-05-17

## 2024-02-22 RX ORDER — METFORMIN HYDROCHLORIDE 500 MG/1
500 TABLET, EXTENDED RELEASE ORAL 2 TIMES DAILY WITH MEALS
COMMUNITY

## 2024-02-22 RX ORDER — DICYCLOMINE HYDROCHLORIDE 20 MG/1
20 TABLET ORAL EVERY 6 HOURS
Status: ON HOLD | COMMUNITY
End: 2024-03-03

## 2024-02-22 RX ORDER — NALTREXONE HYDROCHLORIDE 50 MG/1
50 TABLET, FILM COATED ORAL DAILY
Status: ON HOLD | COMMUNITY
End: 2024-03-06

## 2024-02-22 NOTE — PROGRESS NOTES
Subjective:       Patient ID: Earl Abdul is a 63 y.o. female.    Chief Complaint: Headache and Arm Pain (ER f/u. Feels bad, pain all over)    Here for hospital f/u    IM CV HPI: Continued weakness, trouble sleeping, malaise, nausea. Has not had anything to drink in 5-6 days. Has not scheduled appt with mental health care provider. She is concerned her symptoms are related to her CLL.    Admitted 2/10/24-2/14/24   Admitted for traumatic rhabdo in the setting of heavy alcohol use, DEVANTE secondary to rhabdo, and c/o alcohol withdrawal. Transitioned from Ativan 2mg IV PRN for CIWA>8 to scheduled Ativan 2mg PO q8 taper for c/o alcohol withdrawal with hx of complicated EtOH withdrawal admissions. CK >25k. Given 3L of NS upon admission; added 2L more of IVF to assist with washout of CK. Ortho tapped left elbow but not concerning for septic arthritis. Can use arm as tolerated per ortho. DEVANTE since resolved and CK continuing to improve daily. Seen by PT/OT and will d/c home w/ HH. Medically ready for d/c. Medications reconciled and outpt psych referral in place. PCP f/u scheduled for 2/22/24.    63 year old woman with PMH EtOH use disorder with history of prior seizures, multiple admissions for alcohol withdrawal, depression with suicide attempt in 2017, breast cancer, HTN, HLD, fibromyalgia, sarcoidosis, hypothyroidism, T2DM, CLL (not on treatment) and COPD who presented to [facility] with chief complaint of multiple falls    62 y/o F with hx of EtOH use disorder c/b severe withdrawals, HTN, HLD, R breast cancer s/p bilateral mastectomy previously on Letrozole, CLL not currently on any tx, IDDM2, depression, PUD, press syndrome, sarcoidosis of the lung who presents emergency department for generalized body aches, generalized abdominal pain. Patient denies any sore throat, runny nose, cough or fevers/chills. Reports diffuse abdominal discomfort with no focal pain. States she has had difficulty eating or drinking for the past  "3 days due to nausea vomiting. No chest pain or shortness breath. Denies any urinary symptoms, constipation or diarrhea. Has a frontal gradual onset headache for the past 3 days. States it is similar to previous headaches she has had in the past.                  Review of Systems   Constitutional:  Positive for activity change, appetite change, chills, diaphoresis, fatigue and unexpected weight change. Negative for fever.   HENT:  Negative for ear pain, hearing loss, postnasal drip, tinnitus, trouble swallowing and voice change.    Respiratory:  Negative for cough, chest tightness, shortness of breath and wheezing.    Cardiovascular:  Negative for chest pain, palpitations and leg swelling.   Gastrointestinal:  Positive for abdominal pain and nausea. Negative for blood in stool, diarrhea and vomiting.   Endocrine: Negative for polydipsia, polyphagia and polyuria.   Genitourinary:  Negative for difficulty urinating, dysuria, hematuria and vaginal bleeding.   Skin:  Negative for rash.   Allergic/Immunologic: Negative for food allergies.   Neurological:  Positive for tremors, weakness and headaches. Negative for dizziness, syncope and numbness.   Hematological:  Does not bruise/bleed easily.   Psychiatric/Behavioral:  Positive for decreased concentration, dysphoric mood and sleep disturbance. Negative for self-injury and suicidal ideas. The patient is nervous/anxious.        Objective:      Vitals:    02/22/24 1457   BP: 134/66   BP Location: Right arm   Patient Position: Sitting   Pulse: (!) 115   SpO2: (!) 89%   Weight: 91.5 kg (201 lb 11.5 oz)   Height: 5' 6" (1.676 m)      Physical Exam  Vitals and nursing note reviewed.   Constitutional:       General: She is not in acute distress.     Appearance: Normal appearance. She is well-developed.   HENT:      Head: Normocephalic and atraumatic.      Mouth/Throat:      Pharynx: No oropharyngeal exudate.   Eyes:      General: No scleral icterus.     Conjunctiva/sclera: " Conjunctivae normal.      Pupils: Pupils are equal, round, and reactive to light.   Neck:      Thyroid: No thyromegaly.   Cardiovascular:      Rate and Rhythm: Normal rate and regular rhythm.      Heart sounds: Normal heart sounds. No murmur heard.  Pulmonary:      Effort: Pulmonary effort is normal.      Breath sounds: Normal breath sounds. No wheezing or rales.   Abdominal:      General: There is no distension.   Musculoskeletal:         General: No tenderness.   Lymphadenopathy:      Cervical: No cervical adenopathy.   Skin:     General: Skin is warm and dry.   Neurological:      Mental Status: She is alert and oriented to person, place, and time. Mental status is at baseline.   Psychiatric:         Attention and Perception: She does not perceive auditory or visual hallucinations.         Speech: She is communicative. Speech is not rapid and pressured.         Behavior: Behavior is slowed.         Thought Content: Thought content is not paranoid. Thought content does not include homicidal or suicidal ideation.         Cognition and Memory: She does not exhibit impaired recent memory or impaired remote memory.         Assessment:       1. Chronic nausea    2. Chronic nonintractable headache, unspecified headache type    3. CLL (chronic lymphocytic leukemia)    4. Alcohol withdrawal syndrome without complication    5. 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    Diagnoses and all orders for this visit:    Chronic nausea  Chronic etoh abuse complicated by withdrawal.  -     TSH; Future  -     Magnesium; Future  -     Comprehensive Metabolic Panel; Future  -     sucralfate (CARAFATE) 1 gram tablet; Take 1 tablet (1 g total) by mouth 4 (four) times daily.  Chronic nonintractable headache, unspecified headache type  -     Vitamin B12; Future    CLL (chronic lymphocytic leukemia)  -     LACTATE DEHYDROGENASE;  Future    Alcohol withdrawal syndrome without complication  Vitals WNL and  present and has good insight into [pt condition.   -     chlordiazepoxide (LIBRIUM) 10 MG capsule; 2 cap Q8 for 3 days then 1 cap Q8 for 5 days then 1 caps BID for 3 days then 1 cap QD for 3 days  Office and Emergency Department prompts discussed.    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  Yes.  Diagnostic tests reviewed/disposition: I have reviewed all completed as well as pending diagnostic tests at the time of discharge.  Disease/illness education: etoh abuse. Withdrawal chronic nausea. CLL  Home health/community services discussion/referrals: Patient does not have home health established from hospital visit.  They do need home health.  If needed, we will set up home health for the patient.   Establishment or re-establishment of referral orders for community resources: No other necessary community resources  Discussion with other health care providers: No discussion with other health care providers necessary pt needs mental health provider.                  Andrew Chase MD  [facility]        Side effects of medication(s) were discussed in detail and patient voiced understanding.  Patient will call back for any issues or complications.

## 2024-02-22 NOTE — ED NOTES
1 T SHIRT  1 PANTS   1 SILVER NECKLACE   1 SILVER RING   1 PHONE      L shoulder swollen, decreased ROM 2/2 pain

## 2024-02-29 ENCOUNTER — PATIENT MESSAGE (OUTPATIENT)
Dept: INTERNAL MEDICINE | Facility: CLINIC | Age: 64
End: 2024-02-29
Payer: COMMERCIAL

## 2024-02-29 RX ORDER — LEVOTHYROXINE SODIUM 75 UG/1
75 TABLET ORAL
Qty: 90 TABLET | Refills: 1 | Status: SHIPPED | OUTPATIENT
Start: 2024-02-29

## 2024-02-29 RX ORDER — TRAZODONE HYDROCHLORIDE 100 MG/1
200 TABLET ORAL NIGHTLY
Qty: 60 TABLET | Refills: 11 | Status: ON HOLD | OUTPATIENT
Start: 2024-02-29 | End: 2024-06-09

## 2024-02-29 NOTE — TELEPHONE ENCOUNTER
No care due was identified.  [facility] Embedded Care Due Messages. Reference number: [id number].   2/29/2024 9:20:31 AM CST

## 2024-03-01 ENCOUNTER — HOSPITAL ENCOUNTER (INPATIENT)
Facility: HOSPITAL | Age: 64
LOS: 4 days | Discharge: HOME HEALTH | DRG: 897 | End: 2024-03-06
Attending: STUDENT IN AN ORGANIZED HEALTH CARE EDUCATION/TRAINING PROGRAM | Admitting: INTERNAL MEDICINE
Payer: COMMERCIAL

## 2024-03-01 DIAGNOSIS — F10.20 ALCOHOL USE DISORDER, SEVERE, DEPENDENCE: ICD-10-CM

## 2024-03-01 DIAGNOSIS — R00.0 TACHYARRHYTHMIA: ICD-10-CM

## 2024-03-01 DIAGNOSIS — R00.0 TACHYCARDIA: ICD-10-CM

## 2024-03-01 DIAGNOSIS — F10.939 ALCOHOL WITHDRAWAL SYNDROME WITH COMPLICATION: ICD-10-CM

## 2024-03-01 DIAGNOSIS — I48.91 ATRIAL FIBRILLATION: ICD-10-CM

## 2024-03-01 DIAGNOSIS — I48.91 AF (ATRIAL FIBRILLATION): ICD-10-CM

## 2024-03-01 DIAGNOSIS — R94.31 ABNORMAL EKG: ICD-10-CM

## 2024-03-01 DIAGNOSIS — F10.931 ALCOHOL WITHDRAWAL DELIRIUM: Primary | ICD-10-CM

## 2024-03-01 DIAGNOSIS — F41.8 DEPRESSION WITH ANXIETY: ICD-10-CM

## 2024-03-01 LAB
ALBUMIN SERPL BCP-MCNC: 4 G/DL (ref 3.5–5.2)
ALBUMIN SERPL BCP-MCNC: 4.4 G/DL (ref 3.5–5.2)
ALLENS TEST: ABNORMAL
ALLENS TEST: ABNORMAL
ALP SERPL-CCNC: 62 U/L (ref 55–135)
ALP SERPL-CCNC: 63 U/L (ref 55–135)
ALT SERPL W/O P-5'-P-CCNC: 47 U/L (ref 10–44)
ALT SERPL W/O P-5'-P-CCNC: 47 U/L (ref 10–44)
AMPHET+METHAMPHET UR QL: NEGATIVE
ANION GAP SERPL CALC-SCNC: 25 MMOL/L (ref 8–16)
ANION GAP SERPL CALC-SCNC: 27 MMOL/L (ref 8–16)
ANISOCYTOSIS BLD QL SMEAR: SLIGHT
APAP SERPL-MCNC: <3 UG/ML (ref 10–20)
AST SERPL-CCNC: 82 U/L (ref 10–40)
AST SERPL-CCNC: 88 U/L (ref 10–40)
BACTERIA #/AREA URNS AUTO: NORMAL /HPF
BARBITURATES UR QL SCN>200 NG/ML: NEGATIVE
BASOPHILS NFR BLD: 0 % (ref 0–1.9)
BENZODIAZ UR QL SCN>200 NG/ML: NEGATIVE
BILIRUB SERPL-MCNC: 0.6 MG/DL (ref 0.1–1)
BILIRUB SERPL-MCNC: 0.7 MG/DL (ref 0.1–1)
BILIRUB UR QL STRIP: NEGATIVE
BUN SERPL-MCNC: 11 MG/DL (ref 8–23)
BUN SERPL-MCNC: 13 MG/DL (ref 8–23)
BZE UR QL SCN: NEGATIVE
CALCIUM SERPL-MCNC: 8.7 MG/DL (ref 8.7–10.5)
CALCIUM SERPL-MCNC: 9 MG/DL (ref 8.7–10.5)
CANNABINOIDS UR QL SCN: NEGATIVE
CHLORIDE SERPL-SCNC: 95 MMOL/L (ref 95–110)
CHLORIDE SERPL-SCNC: 96 MMOL/L (ref 95–110)
CK SERPL-CCNC: 52 U/L (ref 20–180)
CLARITY UR REFRACT.AUTO: ABNORMAL
CO2 SERPL-SCNC: 15 MMOL/L (ref 23–29)
CO2 SERPL-SCNC: 16 MMOL/L (ref 23–29)
COLOR UR AUTO: ABNORMAL
CREAT SERPL-MCNC: 0.8 MG/DL (ref 0.5–1.4)
CREAT SERPL-MCNC: 0.9 MG/DL (ref 0.5–1.4)
CREAT UR-MCNC: 182 MG/DL (ref 15–325)
DIFFERENTIAL METHOD BLD: ABNORMAL
EOSINOPHIL NFR BLD: 0 % (ref 0–8)
ERYTHROCYTE [DISTWIDTH] IN BLOOD BY AUTOMATED COUNT: 18.3 % (ref 11.5–14.5)
EST. GFR  (NO RACE VARIABLE): >60 ML/MIN/1.73 M^2
EST. GFR  (NO RACE VARIABLE): >60 ML/MIN/1.73 M^2
ETHANOL SERPL-MCNC: 81 MG/DL
GLUCOSE SERPL-MCNC: 48 MG/DL (ref 70–110)
GLUCOSE SERPL-MCNC: 53 MG/DL (ref 70–110)
GLUCOSE UR QL STRIP: ABNORMAL
HCO3 UR-SCNC: 15.9 MMOL/L (ref 24–28)
HCT VFR BLD AUTO: 34.4 % (ref 37–48.5)
HGB BLD-MCNC: 10.2 G/DL (ref 12–16)
HGB UR QL STRIP: NEGATIVE
HYPOCHROMIA BLD QL SMEAR: ABNORMAL
IMM GRANULOCYTES # BLD AUTO: ABNORMAL K/UL (ref 0–0.04)
IMM GRANULOCYTES NFR BLD AUTO: ABNORMAL % (ref 0–0.5)
KETONES UR QL STRIP: NEGATIVE
LACTATE SERPL-SCNC: 2.1 MMOL/L (ref 0.5–2.2)
LACTATE SERPL-SCNC: 2.6 MMOL/L (ref 0.5–2.2)
LDH SERPL L TO P-CCNC: 2.7 MMOL/L (ref 0.5–2.2)
LEUKOCYTE ESTERASE UR QL STRIP: NEGATIVE
LIPASE SERPL-CCNC: 41 U/L (ref 4–60)
LYMPHOCYTES NFR BLD: 80 % (ref 18–48)
MCH RBC QN AUTO: 26.2 PG (ref 27–31)
MCHC RBC AUTO-ENTMCNC: 29.7 G/DL (ref 32–36)
MCV RBC AUTO: 88 FL (ref 82–98)
METHADONE UR QL SCN>300 NG/ML: NEGATIVE
MICROSCOPIC COMMENT: NORMAL
MONOCYTES NFR BLD: 0 % (ref 4–15)
NEUTROPHILS NFR BLD: 20 % (ref 38–73)
NITRITE UR QL STRIP: NEGATIVE
NRBC BLD-RTO: 0 /100 WBC
OHS QRS DURATION: 84 MS
OHS QTC CALCULATION: 427 MS
OPIATES UR QL SCN: NEGATIVE
PCO2 BLDA: 26.2 MMHG (ref 35–45)
PCP UR QL SCN>25 NG/ML: NEGATIVE
PH SMN: 7.39 [PH] (ref 7.35–7.45)
PH UR STRIP: 5 [PH] (ref 5–8)
PLATELET # BLD AUTO: 118 K/UL (ref 150–450)
PLATELET BLD QL SMEAR: ABNORMAL
PMV BLD AUTO: 9.5 FL (ref 9.2–12.9)
PO2 BLDA: 109 MMHG (ref 40–60)
POC BE: -9 MMOL/L
POC SATURATED O2: 98 % (ref 95–100)
POC TCO2: 17 MMOL/L (ref 24–29)
POLYCHROMASIA BLD QL SMEAR: ABNORMAL
POTASSIUM SERPL-SCNC: 4 MMOL/L (ref 3.5–5.1)
POTASSIUM SERPL-SCNC: 5 MMOL/L (ref 3.5–5.1)
PROT SERPL-MCNC: 6.9 G/DL (ref 6–8.4)
PROT SERPL-MCNC: 7.6 G/DL (ref 6–8.4)
PROT UR QL STRIP: NEGATIVE
RBC # BLD AUTO: 3.89 M/UL (ref 4–5.4)
RBC #/AREA URNS AUTO: 1 /HPF (ref 0–4)
SALICYLATES SERPL-MCNC: <5 MG/DL (ref 15–30)
SAMPLE: ABNORMAL
SAMPLE: ABNORMAL
SITE: ABNORMAL
SITE: ABNORMAL
SMUDGE CELLS BLD QL SMEAR: PRESENT
SODIUM SERPL-SCNC: 136 MMOL/L (ref 136–145)
SODIUM SERPL-SCNC: 138 MMOL/L (ref 136–145)
SP GR UR STRIP: >=1.03 (ref 1–1.03)
SQUAMOUS #/AREA URNS AUTO: 5 /HPF
T4 FREE SERPL-MCNC: 1.12 NG/DL (ref 0.71–1.51)
TOXICOLOGY INFORMATION: NORMAL
TSH SERPL DL<=0.005 MIU/L-ACNC: 0.2 UIU/ML (ref 0.4–4)
URN SPEC COLLECT METH UR: ABNORMAL
WBC # BLD AUTO: 28.71 K/UL (ref 3.9–12.7)
WBC #/AREA URNS AUTO: 2 /HPF (ref 0–5)
YEAST UR QL AUTO: NORMAL

## 2024-03-01 PROCEDURE — 27000221 HC OXYGEN, UP TO 24 HOURS

## 2024-03-01 PROCEDURE — 83605 ASSAY OF LACTIC ACID: CPT

## 2024-03-01 PROCEDURE — 87040 BLOOD CULTURE FOR BACTERIA: CPT | Mod: 59 | Performed by: EMERGENCY MEDICINE

## 2024-03-01 PROCEDURE — 83605 ASSAY OF LACTIC ACID: CPT | Performed by: EMERGENCY MEDICINE

## 2024-03-01 PROCEDURE — 93005 ELECTROCARDIOGRAM TRACING: CPT

## 2024-03-01 PROCEDURE — 25000242 PHARM REV CODE 250 ALT 637 W/ HCPCS: Performed by: EMERGENCY MEDICINE

## 2024-03-01 PROCEDURE — 93010 ELECTROCARDIOGRAM REPORT: CPT | Mod: ,,, | Performed by: INTERNAL MEDICINE

## 2024-03-01 PROCEDURE — 80307 DRUG TEST PRSMV CHEM ANLYZR: CPT

## 2024-03-01 PROCEDURE — 99285 EMERGENCY DEPT VISIT HI MDM: CPT | Mod: 25

## 2024-03-01 PROCEDURE — 63600175 PHARM REV CODE 636 W HCPCS

## 2024-03-01 PROCEDURE — 80053 COMPREHEN METABOLIC PANEL: CPT

## 2024-03-01 PROCEDURE — 94761 N-INVAS EAR/PLS OXIMETRY MLT: CPT

## 2024-03-01 PROCEDURE — 80179 DRUG ASSAY SALICYLATE: CPT | Performed by: EMERGENCY MEDICINE

## 2024-03-01 PROCEDURE — 96374 THER/PROPH/DIAG INJ IV PUSH: CPT

## 2024-03-01 PROCEDURE — 82077 ASSAY SPEC XCP UR&BREATH IA: CPT | Performed by: EMERGENCY MEDICINE

## 2024-03-01 PROCEDURE — 81001 URINALYSIS AUTO W/SCOPE: CPT | Mod: XB

## 2024-03-01 PROCEDURE — 80143 DRUG ASSAY ACETAMINOPHEN: CPT | Performed by: EMERGENCY MEDICINE

## 2024-03-01 PROCEDURE — 84439 ASSAY OF FREE THYROXINE: CPT | Performed by: EMERGENCY MEDICINE

## 2024-03-01 PROCEDURE — 63600175 PHARM REV CODE 636 W HCPCS: Performed by: EMERGENCY MEDICINE

## 2024-03-01 PROCEDURE — 82550 ASSAY OF CK (CPK): CPT | Performed by: EMERGENCY MEDICINE

## 2024-03-01 PROCEDURE — 99900035 HC TECH TIME PER 15 MIN (STAT)

## 2024-03-01 PROCEDURE — 25000003 PHARM REV CODE 250

## 2024-03-01 PROCEDURE — 85027 COMPLETE CBC AUTOMATED: CPT

## 2024-03-01 PROCEDURE — 96376 TX/PRO/DX INJ SAME DRUG ADON: CPT

## 2024-03-01 PROCEDURE — 82962 GLUCOSE BLOOD TEST: CPT

## 2024-03-01 PROCEDURE — 96375 TX/PRO/DX INJ NEW DRUG ADDON: CPT

## 2024-03-01 PROCEDURE — 84443 ASSAY THYROID STIM HORMONE: CPT | Performed by: EMERGENCY MEDICINE

## 2024-03-01 PROCEDURE — 25000003 PHARM REV CODE 250: Performed by: EMERGENCY MEDICINE

## 2024-03-01 PROCEDURE — 94640 AIRWAY INHALATION TREATMENT: CPT

## 2024-03-01 PROCEDURE — 96361 HYDRATE IV INFUSION ADD-ON: CPT

## 2024-03-01 PROCEDURE — 83690 ASSAY OF LIPASE: CPT

## 2024-03-01 PROCEDURE — 85007 BL SMEAR W/DIFF WBC COUNT: CPT

## 2024-03-01 RX ORDER — DIAZEPAM 5 MG/1
10 TABLET ORAL
Status: COMPLETED | OUTPATIENT
Start: 2024-03-01 | End: 2024-03-01

## 2024-03-01 RX ORDER — DIAZEPAM 10 MG/2ML
10 INJECTION INTRAMUSCULAR ONCE
Status: COMPLETED | OUTPATIENT
Start: 2024-03-01 | End: 2024-03-01

## 2024-03-01 RX ORDER — IPRATROPIUM BROMIDE AND ALBUTEROL SULFATE 2.5; .5 MG/3ML; MG/3ML
3 SOLUTION RESPIRATORY (INHALATION)
Status: COMPLETED | OUTPATIENT
Start: 2024-03-01 | End: 2024-03-01

## 2024-03-01 RX ORDER — DIAZEPAM 10 MG/2ML
5 INJECTION INTRAMUSCULAR
Status: COMPLETED | OUTPATIENT
Start: 2024-03-01 | End: 2024-03-01

## 2024-03-01 RX ORDER — METOPROLOL TARTRATE 1 MG/ML
5 INJECTION, SOLUTION INTRAVENOUS EVERY 5 MIN PRN
Status: DISCONTINUED | OUTPATIENT
Start: 2024-03-02 | End: 2024-03-01

## 2024-03-01 RX ORDER — DIPHENHYDRAMINE HYDROCHLORIDE 50 MG/ML
12.5 INJECTION INTRAMUSCULAR; INTRAVENOUS
Status: COMPLETED | OUTPATIENT
Start: 2024-03-01 | End: 2024-03-01

## 2024-03-01 RX ORDER — DROPERIDOL 2.5 MG/ML
0.62 INJECTION, SOLUTION INTRAMUSCULAR; INTRAVENOUS
Status: DISCONTINUED | OUTPATIENT
Start: 2024-03-01 | End: 2024-03-01

## 2024-03-01 RX ORDER — ONDANSETRON HYDROCHLORIDE 2 MG/ML
4 INJECTION, SOLUTION INTRAVENOUS EVERY 8 HOURS PRN
Status: DISCONTINUED | OUTPATIENT
Start: 2024-03-01 | End: 2024-03-06 | Stop reason: HOSPADM

## 2024-03-01 RX ORDER — METOPROLOL TARTRATE 1 MG/ML
5 INJECTION, SOLUTION INTRAVENOUS EVERY 5 MIN PRN
Status: COMPLETED | OUTPATIENT
Start: 2024-03-02 | End: 2024-03-02

## 2024-03-01 RX ORDER — DROPERIDOL 2.5 MG/ML
1.25 INJECTION, SOLUTION INTRAMUSCULAR; INTRAVENOUS ONCE
Status: COMPLETED | OUTPATIENT
Start: 2024-03-01 | End: 2024-03-01

## 2024-03-01 RX ORDER — DROPERIDOL 2.5 MG/ML
1.25 INJECTION, SOLUTION INTRAMUSCULAR; INTRAVENOUS
Status: COMPLETED | OUTPATIENT
Start: 2024-03-01 | End: 2024-03-01

## 2024-03-01 RX ADMIN — DIAZEPAM 10 MG: 5 TABLET ORAL at 10:03

## 2024-03-01 RX ADMIN — SODIUM CHLORIDE 1000 ML: 0.9 INJECTION, SOLUTION INTRAVENOUS at 04:03

## 2024-03-01 RX ADMIN — DROPERIDOL 1.25 MG: 2.5 INJECTION, SOLUTION INTRAMUSCULAR; INTRAVENOUS at 10:03

## 2024-03-01 RX ADMIN — DIPHENHYDRAMINE HYDROCHLORIDE 12.5 MG: 50 INJECTION, SOLUTION INTRAMUSCULAR; INTRAVENOUS at 04:03

## 2024-03-01 RX ADMIN — IPRATROPIUM BROMIDE AND ALBUTEROL SULFATE 3 ML: .5; 3 SOLUTION RESPIRATORY (INHALATION) at 09:03

## 2024-03-01 RX ADMIN — DEXTROSE MONOHYDRATE 250 ML: 100 INJECTION, SOLUTION INTRAVENOUS at 11:03

## 2024-03-01 RX ADMIN — DIAZEPAM 5 MG: 10 INJECTION, SOLUTION INTRAMUSCULAR; INTRAVENOUS at 06:03

## 2024-03-01 RX ADMIN — SODIUM CHLORIDE, SODIUM LACTATE, POTASSIUM CHLORIDE, AND CALCIUM CHLORIDE 700 ML: .6; .31; .03; .02 INJECTION, SOLUTION INTRAVENOUS at 08:03

## 2024-03-01 RX ADMIN — DIAZEPAM 10 MG: 5 TABLET ORAL at 06:03

## 2024-03-01 RX ADMIN — SODIUM CHLORIDE, POTASSIUM CHLORIDE, SODIUM LACTATE AND CALCIUM CHLORIDE 1000 ML: 600; 310; 30; 20 INJECTION, SOLUTION INTRAVENOUS at 06:03

## 2024-03-01 RX ADMIN — DROPERIDOL 1.25 MG: 2.5 INJECTION, SOLUTION INTRAMUSCULAR; INTRAVENOUS at 05:03

## 2024-03-01 RX ADMIN — DIAZEPAM 10 MG: 10 INJECTION, SOLUTION INTRAMUSCULAR; INTRAVENOUS at 11:03

## 2024-03-01 NOTE — ED NOTES
Pt BIB EMS for nausea and generalized body aches starting last night.  Pt denies sick contacts.  Denies fever/chills.  Pt denies emesis.  Pt denies pain, denies SOB.   Pt taking zofran at home with no relief.  VSS.  Skin warm/dry, afebrile. Abdomen soft/nontender.  Bed low/locked, siderails uxp2, pt agrees to plan of care.

## 2024-03-01 NOTE — ED PROVIDER NOTES
Encounter Date: 3/1/2024       History     Chief Complaint   Patient presents with    Nausea    Generalized Body Aches     X 3 day her 3 days ago. Ran out of zofran so is coming back      HPI    Earl Abdul is a 63 y.o. female w/ a PMHx of EtOH use disorder, CLL not currently on any treatment, COPD, HTN, depression on multiple antidepressants, right breast cancer s/p bilateral mastectomy, hypothyroidism. Patient presents to the ED today for generalized fatigue and nausea.  Patient states that these are both chronic issues, but her nauseous becoming unbearable.  Patient was seen on 04/20 for similar complaints and was discharged w/ prescription for Zofran, which he states has not been helping her nausea. Patient denies f/c HA, lightheadedness, syncope, CP, SOB, abdominal pain, vomiting, around obstipation, diarrhea, dysuria, hematuria, melena/hematochezia, lower extremity edema/erythema, abdominal distention, SI/HI, recent EtOH use.  Please see MDM for further details.      Review of patient's allergies indicates:   Allergen Reactions    Lortab [hydrocodone-acetaminophen] Itching    Promethazine Itching and Other (See Comments)     Past Medical History:   Diagnosis Date    Alcoholism     c/b alcohol withdrawl seizures 7/2017    Anemia     Cancer of breast 10/2020    s/p bilateral mastectomy for  T1b N0 stage IA breast cancer October 2020    CLL (chronic lymphocytic leukemia) 09/30/2022 6/22/22 - PB flow cytometry  Immunophenotyping of peripheral blood detects a distinct kappa light chain restricted monoclonal B-cell population  (calculated at 2.44x10 9 /L, from the most recent CBC showing a total WBC of  7.35 K/uL with 61% total lymphocytes)  with a CLL phenotype (coexpression of CD19, CD5, CD23 and dim CD20). CD22 (FITC), FMC-7 and CD38 are negative in this population.    Controlled type 2 diabetes mellitus without complication, without long-term current use of insulin 11/30/2021    COPD (chronic  obstructive pulmonary disease)     Depression     Diverticulitis     Fatty liver     GERD (gastroesophageal reflux disease)     Hyperlipidemia     Hypertension     Pancreatitis     Peptic ulcer disease     Polysubstance abuse     Posterior reversible encephalopathy syndrome     Sarcoidosis of lung     over 30 yrs ago    Suicide attempt      Past Surgical History:   Procedure Laterality Date    APPENDECTOMY      BILATERAL MASTECTOMY Bilateral 10/29/2020    Procedure: MASTECTOMY, BILATERAL;  Surgeon: Baylee Kevin MD;  Location: [address];  Service: General;  Laterality: Bilateral;    BREAST REVISION SURGERY Bilateral 2/11/2021    Procedure: BREAST REVISION SURGERY;  Surgeon: Scottie Johnson MD;  Location: [address];  Service: Plastics;  Laterality: Bilateral;    COLONOSCOPY N/A 7/28/2017    Procedure: COLONOSCOPY;  Surgeon: Aaron Alvarado MD;  Location: [facility];  Service: Endoscopy;  Laterality: N/A;    ESOPHAGOGASTRODUODENOSCOPY  10/7/2016, 11/6/2014    2016 - gastritis, duodenitis, 2014 erosive gastritis    ESOPHAGOGASTRODUODENOSCOPY N/A 2/11/2020    Procedure: ESOPHAGOGASTRODUODENOSCOPY (EGD);  Surgeon: Fawn Garrido MD;  Location: [facility];  Service: Endoscopy;  Laterality: N/A;    ESOPHAGOGASTRODUODENOSCOPY N/A 4/19/2021    Procedure: EGD (ESOPHAGOGASTRODUODENOSCOPY);  Surgeon: Paramjit Martino MD;  Location: [facility];  Service: Endoscopy;  Laterality: N/A;    FLEXIBLE SIGMOIDOSCOPY  11/06/2014    colitis    HYSTERECTOMY      IMPLANTATION OF PERMANENT SACRAL NERVE STIMULATOR N/A 7/12/2022    Procedure: INSERTION, NEUROSTIMULATOR, PERMANENT, SACRAL;  Surgeon: Juaquin Edwards MD;  Location: [address];  Service: Urology;  Laterality: N/A;  1hr    INJECTION FOR SENTINEL NODE IDENTIFICATION Right 10/29/2020    Procedure: INJECTION, FOR SENTINEL NODE IDENTIFICATION;  Surgeon: Baylee Kevin MD;  Location: [address];  Service: General;  Laterality: Right;    INJECTION OF  JOINT Right 10/10/2019    Procedure: Injection, Joint RIGHT ILIOPSOAS BURSA/TENDON INJECTION AND RIGHT GLUTEAL TENDON INJECTION WITH STEROID AND LIDOCAINE;  Surgeon: Guillaume Rico MD;  Location: [facility];  Service: Pain Management;  Laterality: Right;  NEEDS CONSENT    INSERTION OF BREAST TISSUE EXPANDER Bilateral 10/29/2020    Procedure: INSERTION, TISSUE EXPANDER, BREAST;  Surgeon: Scottie Johnson MD;  Location: [address];  Service: Plastics;  Laterality: Bilateral;  Right breast: 1082 g  Left breast: 1076 g    LIPOSUCTION Bilateral 2/11/2021    Procedure: LIPOSUCTION;  Surgeon: Scottie Johnson MD;  Location: [facility] OR [address] [place] [address];  Service: Plastics;  Laterality: Bilateral;    mediastenoscopy      REPLACEMENT OF IMPLANT OF BREAST Bilateral 2/11/2021    Procedure: REPLACEMENT, IMPLANT, BREAST;  Surgeon: Scottie Johnson MD;  Location: [facility] OR [address] [place] [address];  Service: Plastics;  Laterality: Bilateral;    SENTINEL LYMPH NODE BIOPSY Right 10/29/2020    Procedure: BIOPSY, LYMPH NODE, SENTINEL;  Surgeon: Baylee Kevin MD;  Location: [address];  Service: General;  Laterality: Right;    TONSILLECTOMY N/A 1970    TUBAL LIGATION       Family History   Problem Relation Age of Onset    Heart attack Father     Diabetes Father     Hypertension Father     Diabetes Mother     Hypertension Mother     Breast cancer Maternal Aunt     Colon cancer Maternal Uncle     Breast cancer Daughter     Ovarian cancer Neg Hx     Cancer Neg Hx      Social History     Tobacco Use    Smoking status: Every Day     Current packs/day: 0.00     Average packs/day: 0.5 packs/day for 30.0 years (15.0 ttl pk-yrs)     Types: Vaping with nicotine, Cigarettes     Start date: 2/1/1991     Last attempt to quit: 2/1/2021     Years since quitting: 3.0    Smokeless tobacco: Never    Tobacco comments:     Patient is currently smoking 10 cigarettes a day, declines nicotine patches   Substance Use Topics    Alcohol use: Yes     Comment:  vodka daily (half a regular bottle) for 4 days    Drug use: Yes     Types: Marijuana     Comment: gummies     Review of Systems    Physical Exam     Initial Vitals [03/01/24 1459]   BP Pulse Resp Temp SpO2   (!) 155/74 103 18 98.3 °F (36.8 °C) 100 %      MAP       --         Physical Exam    Nursing note and vitals reviewed.  Constitutional: She appears well-developed and well-nourished. She is not diaphoretic. She appears distressed.   HENT:   Head: Normocephalic and atraumatic.   Nose: Nose normal.   Eyes: Conjunctivae and EOM are normal. Pupils are equal, round, and reactive to light. No scleral icterus.   Neck: Neck supple. No JVD present.   Normal range of motion.  Cardiovascular:  Regular rhythm, normal heart sounds and intact distal pulses.           Tachycardic   Pulmonary/Chest: Breath sounds normal. No respiratory distress.   Abdominal: Abdomen is soft. Bowel sounds are normal. She exhibits no distension. There is no abdominal tenderness. There is no rebound and no guarding.   Musculoskeletal:         General: No tenderness or edema. Normal range of motion.      Cervical back: Normal range of motion and neck supple.     Neurological: She is alert and oriented to person, place, and time. She has normal strength. No sensory deficit.   Skin: Skin is warm and dry. No rash noted. No erythema.   Psychiatric: Her speech is normal. Her mood appears anxious. Her affect is labile. Her affect is not angry. Her speech is not slurred. She is withdrawn. She is not agitated, not aggressive and not actively hallucinating. Thought content is not paranoid and not delusional. Cognition and memory are normal. She expresses no homicidal and no suicidal ideation.         ED Course   Procedures  Labs Reviewed   CBC W/ AUTO DIFFERENTIAL - Abnormal; Notable for the following components:       Result Value    WBC 28.71 (*)     RBC 3.89 (*)     Hemoglobin 10.2 (*)     Hematocrit 34.4 (*)     MCH 26.2 (*)     MCHC 29.7 (*)     RDW  18.3 (*)     Platelets 118 (*)     Gran % 20.0 (*)     Lymph % 80.0 (*)     Mono % 0.0 (*)     Platelet Estimate Decreased (*)     All other components within normal limits    Narrative:     ADD ON LIPASE PER LETA NOE RN PER CRISTOPHER BARR MD ORDER#   [id number] @  03/01/2024  17:23    COMPREHENSIVE METABOLIC PANEL - Abnormal; Notable for the following components:    CO2 15 (*)     Glucose 53 (*)     AST 88 (*)     ALT 47 (*)     Anion Gap 27 (*)     All other components within normal limits   URINALYSIS, REFLEX TO URINE CULTURE - Abnormal; Notable for the following components:    Appearance, UA Hazy (*)     Specific Gravity, UA >=1.030 (*)     Glucose, UA 3+ (*)     All other components within normal limits    Narrative:     Specimen Source->Urine   ALCOHOL,MEDICAL (ETHANOL) - Abnormal; Notable for the following components:    Alcohol, Serum 81 (*)     All other components within normal limits   SALICYLATE LEVEL - Abnormal; Notable for the following components:    Salicylate Lvl <5.0 (*)     All other components within normal limits   LACTIC ACID, PLASMA - Abnormal; Notable for the following components:    Lactate (Lactic Acid) 2.6 (*)     All other components within normal limits   TSH - Abnormal; Notable for the following components:    TSH 0.203 (*)     All other components within normal limits   ACETAMINOPHEN LEVEL - Abnormal; Notable for the following components:    Acetaminophen (Tylenol), Serum <3.0 (*)     All other components within normal limits   COMPREHENSIVE METABOLIC PANEL - Abnormal; Notable for the following components:    CO2 16 (*)     Glucose 48 (*)     AST 82 (*)     ALT 47 (*)     Anion Gap 25 (*)     All other components within normal limits   ISTAT PROCEDURE - Abnormal; Notable for the following components:    POC PCO2 26.2 (*)     POC PO2 109 (*)     POC HCO3 15.9 (*)     POC BE -9 (*)     POC TCO2 17 (*)     All other components within normal limits   ISTAT LACTATE - Abnormal;  Notable for the following components:    POC Lactate 2.70 (*)     All other components within normal limits   POCT GLUCOSE - Abnormal; Notable for the following components:    POCT Glucose 53 (*)     All other components within normal limits   POCT GLUCOSE - Abnormal; Notable for the following components:    POCT Glucose 67 (*)     All other components within normal limits   LIPASE   LIPASE    Narrative:     ADD ON LIPASE PER LETA NOE RN PER CRISTOPHER BARR MD ORDER#   [id number] @  03/01/2024  17:23    URINALYSIS MICROSCOPIC    Narrative:     Specimen Source->Urine   CK   DRUG SCREEN PANEL, URINE EMERGENCY   T4, FREE   LACTIC ACID, PLASMA   DRUG SCREEN PANEL, URINE EMERGENCY    Narrative:     Specimen Source->Urine   BETA - HYDROXYBUTYRATE, SERUM   SARS-COV2 (COVID) WITH FLU/RSV BY PCR   PROTIME-INR   POCT GLUCOSE   ISTAT LACTATE   POCT GLUCOSE MONITORING CONTINUOUS     EKG Readings: (Independently Interpreted)   Sinus tachycardia at a ventricular rate of 102 beats per minute; normal ventricular axis; IA/QRS/QTC intervals within normal limits with no STEMI.  ____________________  Rolando Barr MD, [facility]  Emergency Medicine Staff  3:44 PM 3/1/2024       ECG Results              EKG 12-lead (Final result)        Collection Time Result Time QRS Duration OHS QTC Calculation    03/02/24 00:17:40 03/02/24 07:45:56 88 405                     Final result by Interface, Lab In [facility] (03/02/24 07:46:01)                   Narrative:    Test Reason : R00.0,    Vent. Rate : 092 BPM     Atrial Rate : 092 BPM     P-R Int : 156 ms          QRS Dur : 088 ms      QT Int : 328 ms       P-R-T Axes : 082 074 051 degrees     QTc Int : 405 ms    Normal sinus rhythm  Normal ECG  When compared with ECG of 01-MAR-2024 23:17,  Sinus rhythm has replaced AF/AFlutter  Vent. rate has decreased BY  61 BPM    Confirmed by Salvador Munoz MD ([id number]) on 3/2/2024 7:45:55 AM    Referred By: [email or web address]   SELF           Confirmed By:Salvador Munoz  MD                                     EKG 12-lead (Final result)        Collection Time Result Time QRS Duration OHS QTC Calculation    03/01/24 23:17:02 03/02/24 09:49:50 88 434                     Final result by Interface, Lab In [facility] (03/02/24 09:49:59)                   Narrative:    Test Reason : I48.91,    Vent. Rate : 153 BPM     Atrial Rate : 312 BPM     P-R Int : 000 ms          QRS Dur : 088 ms      QT Int : 272 ms       P-R-T Axes : 000 088 008 degrees     QTc Int : 434 ms    AF/ Atrial flutter with variable A-V block  Possible Anterior infarct (cited on or before 01-MAR-2024)  Abnormal ECG  When compared with ECG of 01-MAR-2024 21:15,  AF/ Atrial flutter has replaced Sinus rhythm  Nonspecific T wave abnormality now evident in Inferior leads  Confirmed by Sg STEPHENSON MD ([id number]) on 3/2/2024 9:49:48 AM    Referred By: [email or web address]   SELF           Confirmed By:Sg STEPHENSON MD                                     EKG 12-lead (Final result)        Collection Time Result Time QRS Duration OHS QTC Calculation    03/01/24 21:15:34 03/02/24 07:33:33 82 420                     Final result by Interface, Lab In [facility] (03/02/24 07:33:36)                   Narrative:    Test Reason : R94.31,    Vent. Rate : 115 BPM     Atrial Rate : 115 BPM     P-R Int : 140 ms          QRS Dur : 082 ms      QT Int : 304 ms       P-R-T Axes : 078 065 073 degrees     QTc Int : 420 ms    Sinus tachycardia with occasional Premature ventricular complexes  Otherwise normal ECG  When compared with ECG of 01-MAR-2024 21:12,  Sinus rhythm has replaced Atrial fibrillation  Confirmed by Salvador Munoz MD ([id number]) on 3/2/2024 7:33:32 AM    Referred By: [email or web address]   SELF           Confirmed By:Salvador Munoz MD                                     EKG 12-lead (Final result)        Collection Time Result Time QRS Duration OHS QTC Calculation    03/01/24 21:12:17 03/02/24 07:33:16 82 460                     Final result by Interface, Lab In [facility]  "(03/02/24 07:33:20)                   Narrative:    Test Reason : R00.0,    Vent. Rate : 158 BPM     Atrial Rate : 102 BPM     P-R Int : 000 ms          QRS Dur : 082 ms      QT Int : 284 ms       P-R-T Axes : 000 066 166 degrees     QTc Int : 460 ms    Atrial fibrillation with rapid ventricular response  Nonspecific ST and/or T wave abnormalities  Abnormal ECG  When compared with ECG of 01-MAR-2024 15:16,  Atrial fibrillation has replaced Sinus rhythm  Vent. rate has increased BY  56 BPM  Confirmed by Salvador Munoz MD ([id number]) on 3/2/2024 7:33:10 AM    Referred By: [email or web address]   SELF           Confirmed By:Salvador Munoz MD                                  Imaging Results              X-Ray Chest AP Portable (Final result)  Result time 03/01/24 21:23:46      Final result by Boubacar Rob MD (03/01/24 21:23:46)                   Impression:      Overall limited quality study with increased attenuation in the right lung and left lung base as discussed above, potentially artifactual related to overlapping breast prostheses and soft tissue attenuation of the x-ray beam.  Follow-up with PA and lateral views may provide improved sensitivity if there is strong concern for pneumonia in this patient with sepsis.    Otherwise, no significant abnormality.      Electronically signed by: Boubacar Rob MD  Date:    03/01/2024  Time:    21:23               Narrative:    EXAMINATION:  XR CHEST AP PORTABLE    CLINICAL HISTORY:  Provided history is "Sepsis;  ".    TECHNIQUE:  One view of the chest.    COMPARISON:  02/20/2024.    FINDINGS:  Exam quality is limited by suboptimal positioning and portable technique.  Soft tissue attenuation of the x-ray beam also limits evaluation.  Cardiomediastinal silhouette is magnified by portable technique, and appears similar to prior.  Increased attenuation in the retrocardiac left lung base could be exaggerated by soft tissue attenuation.  Left pleural effusion or airspace disease is " difficult to exclude.  Vague increased ground-glass attenuation overlying the right lung could be exaggerated by technique and overlapping structures/prostheses, noting similar appearance of this area on prior studies.  Otherwise, no confluent area of consolidation.  No large pleural effusion.  No pneumothorax.                                       Medications   ondansetron injection 4 mg ( Intravenous Canceled Entry 3/3/24 0741)   metoprolol tartrate (LOPRESSOR) tablet 25 mg (25 mg Oral Given 3/3/24 0846)   multivitamin tablet (1 tablet Oral Given 3/3/24 0846)   folic acid tablet 1 mg (1 mg Oral Given 3/3/24 0846)   thiamine tablet 100 mg (100 mg Oral Given 3/3/24 0846)   dicyclomine tablet 20 mg (20 mg Oral Given 3/3/24 1800)   FLUoxetine capsule 10 mg (10 mg Oral Given 3/3/24 0846)   gabapentin capsule 300 mg (300 mg Oral Given 3/3/24 1422)   levothyroxine tablet 75 mcg (75 mcg Oral Given 3/3/24 0551)   pantoprazole EC tablet 40 mg (40 mg Oral Given 3/3/24 0846)   glucose chewable tablet 16 g (has no administration in time range)   glucose chewable tablet 24 g (has no administration in time range)   glucagon (human recombinant) injection 1 mg (has no administration in time range)   nicotine 21 mg/24 hr 1 patch (1 patch Transdermal Patch Applied 3/3/24 0846)   dextrose 10% bolus 125 mL 125 mL (has no administration in time range)   dextrose 10% bolus 250 mL 250 mL (has no administration in time range)   traZODone tablet 200 mg (200 mg Oral Given 3/2/24 2131)   diazePAM tablet 10 mg (10 mg Oral Given 3/3/24 1422)   apixaban tablet 5 mg (5 mg Oral Given 3/3/24 1422)   LORazepam injection 2 mg (2 mg Intravenous Given 3/3/24 1800)   potassium chloride SA CR tablet 20 mEq (has no administration in time range)   potassium chloride SA CR tablet 20 mEq (has no administration in time range)   potassium chloride SA CR tablet 20 mEq (has no administration in time range)   magnesium oxide tablet 400 mg (has no administration  in time range)   magnesium oxide tablet 400 mg (has no administration in time range)   magnesium oxide tablet 800 mg (has no administration in time range)   k phos di & mono-sod phos mono 250 mg tablet 1 tablet (has no administration in time range)   k phos di & mono-sod phos mono 250 mg tablet 2 tablet (has no administration in time range)   sodium chloride 0.9% bolus 1,000 mL 1,000 mL (0 mLs Intravenous Stopped 3/1/24 1815)   droPERidol injection 1.25 mg (1.25 mg Intravenous Given 3/1/24 1730)   diphenhydrAMINE injection 12.5 mg (12.5 mg Intravenous Given 3/1/24 1630)   diazePAM injection 5 mg (5 mg Intravenous Given 3/1/24 1811)   diazePAM tablet 10 mg (10 mg Oral Given 3/1/24 1811)   lactated ringers bolus 1,000 mL (0 mLs Intravenous Stopped 3/1958)   diazePAM tablet 10 mg (10 mg Oral Given 3/1/24 1859)   lactated ringers bolus 700 mL (0 mLs Intravenous Stopped 3/1/24 2145)   albuterol-ipratropium 2.5 mg-0.5 mg/3 mL nebulizer solution 3 mL (3 mLs Nebulization Given 3/1/24 2144)   diazePAM tablet 10 mg (10 mg Oral Given 3/1/24 2233)   droPERidol injection 1.25 mg (1.25 mg Intravenous Given 3/1/24 2233)   diazePAM injection 10 mg (10 mg Intravenous Given 3/1/24 2304)   dextrose 10% bolus 250 mL 250 mL (0 mLs Intravenous Stopped 3/2/24 0003)   metoprolol injection 5 mg (5 mg Intravenous Given 3/2/24 0015)   dextrose 10% bolus 250 mL 250 mL (0 mLs Intravenous Stopped 3/2/24 0410)   sodium phosphate 20.01 mmol in dextrose 5 % (D5W) 250 mL IVPB (0 mmol Intravenous Stopped 3/2/24 1224)   potassium, sodium phosphates 280-160-250 mg packet 1 packet (1 packet Oral Given 3/2/24 2114)   magnesium sulfate 2g in water 50mL IVPB (premix) (0 g Intravenous Stopped 3/2/24 1317)   potassium, sodium phosphates 280-160-250 mg packet 2 packet (2 packets Oral Given 3/3/24 8368)   acetaminophen tablet 650 mg (650 mg Oral Given 3/3/24 1032)     Medical Decision Making  Patient is a 63-year-old female w/ an extensive PMHx who  presents to the ED for generalized fatigue and nausea.  Of note, patient was recently seen for similar issues on 02/20. On initial evaluation, patient w/tearful affect, slightly hypertensive (155/74), and tachycardic (103); patient currently on 3 L nasal cannula (home oxygen requirement 3 L nasal cannula) w/ sats in the upper 90s. On exam, patient has no abdominal tenderness to palpation in all 4 quadrants. Cardiac and pulmonary exam WNL. Despite flat affect, I do not believe that patient is a risk to herself or others and does not need to be PEC'd at this time.    On re-evaluation, patient becoming increasingly agitated w/tremulousness.  Patient now also w/significant HTN w/ SBP in the 180s and tachycardic w/ heart rate in 140s.  She notes states that she has been drinking 1 bottle of vodka per day despite her earlier restrictions that she has not drank in months.  Patient states that her last drink was this morning and that she feels she is going into withdrawal. Patient not PEC at this time, as she is agreeable to continued ED treatment/workup.  Patient treated w/benzodiazepines for withdrawal, which significantly improved patient's symptoms.  Despite this, patient remained hypertensive and tachycardic.  Laboratory results at this time significantly increased suspicion for EtOH use/withdrawal as patient has evidence of metabolic acidosis w/respiratory compensation given elevated lactate and VBG findings.  On multiple re-evaluations, patient is still appeared to be in acute withdrawal and multiple rounds of benzodiazepines were given.  Patient entered AFib w/ RVR and subsequently re-entered normal sinus rhythm multiple times throughout her ED course.  I suspect that this dysrhythmia is 2/2 EtOH withdrawal and/or hypoglycemia given patient's presentation, symptoms, and labs.  Considering patient's ED course, patient was evaluated by MICU and subsequently admitted to their service for continued management and  monitoring.  Please see details below for further clarity on medical decision-making process.    ED interventions include   - droperidol for nausea x2 w/ significant improvement in symptoms  - normal saline for a total of 30 cc/kg per sepsis protocol  - diazepam 10 mg x5 given over patient's ED course for EtOH withdrawal.   - metoprolol given for rate control given atrial fibrillation     Labs include:  - CBC w/ significant leukocytosis of 28.71.  I suspect that this leukocytosis is likely reactive to patient's withdrawal as there is no obvious source of infection.  Patient w/ mild anemia, but is asymptomatic and these measurements are improved from previous lab values.  - CMP obtained at 16:23 initially showed anion gap of 27 w/ CO2 of 15 and elevated LFTs.  These initial findings in combination w/elevated lactate and VBG consistent w/metabolic acidosis w/respiratory compensation.  I suspect that this is likely 2/2 patient's EtOH use/withdrawal.  Repeat CMP obtained at 22:44 w/similar findings but now patient appears to be hypoglycemic w/ a glucose of 48.  I suspect that hypoglycemia may be playing a role in patient's dysrhythmias-will give dextrose for hypoglycemia.  - UA w/significantly elevated specific gravity concerning for dehydration and consistent w/the patient's decreased p.o. intake.  No signs of infection.  - EtOH elevated to 81.  This is consistent w/ patient's reported EtOH use this morning.  Patient does not appear to be actively intoxicated.  - UDS negative.  Reducing concern for polysubstance use.  - VBG shows likely metabolic acidosis w/respiratory compensation as pH is WNL, CO2 significantly decreased at 26.2 w/elevated pCO2 at 109 and bicarb of 15.9.  This is again consistent w/EtOH use/withdrawal.  - lactate obtained at 21:32 elevated at 2.70.  Repeat lactate downtrending.  These findings are consistent w/metabolic acidosis likely 2/2 EtOH use/withdrawal.  - acetaminophen level WNL, decreased  concern for coingestion  - salicylate level WNL, decreased concern for coingestion  - TSH of 0.203 consistent w/ patient's known hypothyroidism.  Free T4 WNL, reducing concern for metabolic abnormality leading to patient's current symptoms.  - CPK WNL, reducing concern for rhabdomyolysis at this time.  - lipase WNL, reducing concern for acute pancreatitis.    Imaging include:  - CXR WNL, reducing concern for pneumonia or other acute intrathoracic process.  Lack of these findings again decreases concern for sepsis as there is still no evident focus of infection.     Ddx including, but not limited to:  Chronic/intractable nausea v. gastritis/gastroenteritis v. pancreatitis v. electrolyte abnormality v. Medication side-effect v. ACS v. substance use/intoxication v. EtOH withdrawal v. hypoglycemia v. dysrhythmia     Most likely Dx:  At this time, patient's presentation, symptoms, and response to benzodiazepines most consistent w/acute EtOH withdrawal. Reduced concern for infection/sepsis at this time given lack of infectious source, however patient will still be treated w/fluids per sepsis protocol.  I suspect that patient's intermittent dysrhythmias are 2/2 EtOH withdrawal and/or hypoglycemia.  Decreased suspicion for acute electrolyte abnormalities given normal CMP.  Reduced concern for acute endocrine abnormalities leading to patient's symptoms given free T4 WNL.     Disposition:   Patient will be admitted to MICU. Discussed plan with patient and/or caregiver who expressed understanding and agreement.    Please see HPI, physical exam, ED course for additional details.    Amount and/or Complexity of Data Reviewed  Labs: ordered.  Radiology: ordered.    Risk  Prescription drug management.  Decision regarding hospitalization.              Attending Attestation:   Physician Attestation Statement for Resident:  As the supervising MD   Physician Attestation Statement: I have personally seen and examined this patient.   I  agree with the above history.  -:   As the supervising MD I agree with the above PE.     As the supervising MD I agree with the above treatment, course, plan, and disposition.            Attending Critical Care:   Critical Care Times:   Direct Patient Care (initial evaluation, reassessments, and time considering the case)................................................................20 minutes.   Ordering, Reviewing, and Interpreting Diagnostic Studies...............................................................................................................5 minutes.   Documentation..................................................................................................................................................................................5 minutes.   ==============================================================  Total Critical Care Time - exclusive of procedural time: 30 minutes.  ==============================================================  Critical care was necessary to treat or prevent imminent or life-threatening deterioration of the following conditions: metabolic crisis and cardiac arrhythmia.   Critical care was time spent personally by me on the following activities: obtaining history from patient or relative, examination of patient, review of old charts, ordering lab, x-rays, and/or EKG, development of treatment plan with patient or relative, ordering and performing treatments and interventions, evaluation of patient's response to treatment, discussion with consultants, re-evaluation of patient's conition and interpretation of cardiac measurements.   Critical Care Condition: potentially life-threatening                                  Clinical Impression:  Final diagnoses:  [F10.931] Alcohol withdrawal delirium (Primary)  [R00.0] Tachycardia  [R94.31] Abnormal EKG  [I48.91] AF (atrial fibrillation)  [R00.0] Tachyarrhythmia          ED Disposition Condition    Admit Stable                 Paramjit Palmer MD  Resident  03/02/24 0030       Hemal Bliss MD  03/03/24 [id number]

## 2024-03-01 NOTE — ED NOTES
Pt observed vaping repeatedly in hallway bed and educated by RN to not vape. Pt verbalizes understanding. Charge nurse notified and aware. Charge nurse spoke to pt. Pt observed to vape again. Charge nurse and security at bedside. Security confiscated vape.  Pt screaming in hallway stating they are ready to go and began to strip out of gown, pt redirected. MD Spencer at bedside at this time. Pt agreeing to stay.

## 2024-03-02 PROBLEM — R29.898 WEAKNESS OF LEFT UPPER EXTREMITY: Status: ACTIVE | Noted: 2024-03-02

## 2024-03-02 PROBLEM — D63.8 ANEMIA, CHRONIC DISEASE: Status: ACTIVE | Noted: 2024-03-02

## 2024-03-02 PROBLEM — E05.90 SUBCLINICAL HYPERTHYROIDISM: Status: ACTIVE | Noted: 2024-03-02

## 2024-03-02 LAB
ALBUMIN SERPL BCP-MCNC: 4 G/DL (ref 3.5–5.2)
ALLENS TEST: NORMAL
ALP SERPL-CCNC: 60 U/L (ref 55–135)
ALT SERPL W/O P-5'-P-CCNC: 47 U/L (ref 10–44)
ANION GAP SERPL CALC-SCNC: 23 MMOL/L (ref 8–16)
ANISOCYTOSIS BLD QL SMEAR: SLIGHT
AST SERPL-CCNC: 82 U/L (ref 10–40)
BASOPHILS # BLD AUTO: ABNORMAL K/UL (ref 0–0.2)
BASOPHILS NFR BLD: 0 % (ref 0–1.9)
BILIRUB SERPL-MCNC: 0.7 MG/DL (ref 0.1–1)
BUN SERPL-MCNC: 10 MG/DL (ref 8–23)
CALCIUM SERPL-MCNC: 8.8 MG/DL (ref 8.7–10.5)
CHLORIDE SERPL-SCNC: 97 MMOL/L (ref 95–110)
CO2 SERPL-SCNC: 15 MMOL/L (ref 23–29)
CREAT SERPL-MCNC: 1.1 MG/DL (ref 0.5–1.4)
DELSYS: NORMAL
DIFFERENTIAL METHOD BLD: ABNORMAL
EOSINOPHIL # BLD AUTO: ABNORMAL K/UL (ref 0–0.5)
EOSINOPHIL NFR BLD: 0 % (ref 0–8)
ERYTHROCYTE [DISTWIDTH] IN BLOOD BY AUTOMATED COUNT: 18.3 % (ref 11.5–14.5)
EST. GFR  (NO RACE VARIABLE): 55.8 ML/MIN/1.73 M^2
GLUCOSE SERPL-MCNC: 66 MG/DL (ref 70–110)
HCT VFR BLD AUTO: 33.1 % (ref 37–48.5)
HGB BLD-MCNC: 9.8 G/DL (ref 12–16)
HYPOCHROMIA BLD QL SMEAR: ABNORMAL
IMM GRANULOCYTES # BLD AUTO: ABNORMAL K/UL (ref 0–0.04)
IMM GRANULOCYTES NFR BLD AUTO: ABNORMAL % (ref 0–0.5)
INFLUENZA A, MOLECULAR: NOT DETECTED
INFLUENZA B, MOLECULAR: NOT DETECTED
INR PPP: 1 (ref 0.8–1.2)
LDH SERPL L TO P-CCNC: 0.76 MMOL/L (ref 0.5–2.2)
LYMPHOCYTES # BLD AUTO: ABNORMAL K/UL (ref 1–4.8)
LYMPHOCYTES NFR BLD: 69 % (ref 18–48)
MAGNESIUM SERPL-MCNC: 1.7 MG/DL (ref 1.6–2.6)
MCH RBC QN AUTO: 26.1 PG (ref 27–31)
MCHC RBC AUTO-ENTMCNC: 29.6 G/DL (ref 32–36)
MCV RBC AUTO: 88 FL (ref 82–98)
MONOCYTES # BLD AUTO: ABNORMAL K/UL (ref 0.3–1)
MONOCYTES NFR BLD: 4 % (ref 4–15)
NEUTROPHILS NFR BLD: 27 % (ref 38–73)
NRBC BLD-RTO: 0 /100 WBC
OHS QRS DURATION: 82 MS
OHS QRS DURATION: 82 MS
OHS QRS DURATION: 88 MS
OHS QRS DURATION: 88 MS
OHS QTC CALCULATION: 405 MS
OHS QTC CALCULATION: 420 MS
OHS QTC CALCULATION: 434 MS
OHS QTC CALCULATION: 460 MS
OVALOCYTES BLD QL SMEAR: ABNORMAL
PHOSPHATE SERPL-MCNC: 1.4 MG/DL (ref 2.7–4.5)
PLATELET # BLD AUTO: 104 K/UL (ref 150–450)
PLATELET BLD QL SMEAR: ABNORMAL
PMV BLD AUTO: 10 FL (ref 9.2–12.9)
POCT GLUCOSE: 102 MG/DL (ref 70–110)
POCT GLUCOSE: 209 MG/DL (ref 70–110)
POCT GLUCOSE: 53 MG/DL (ref 70–110)
POCT GLUCOSE: 67 MG/DL (ref 70–110)
POCT GLUCOSE: 95 MG/DL (ref 70–110)
POIKILOCYTOSIS BLD QL SMEAR: SLIGHT
POLYCHROMASIA BLD QL SMEAR: ABNORMAL
POTASSIUM SERPL-SCNC: 4.8 MMOL/L (ref 3.5–5.1)
PROT SERPL-MCNC: 7.1 G/DL (ref 6–8.4)
PROTHROMBIN TIME: 11.1 SEC (ref 9–12.5)
RBC # BLD AUTO: 3.75 M/UL (ref 4–5.4)
RSV AG BY MOLECULAR METHOD: NOT DETECTED
SAMPLE: NORMAL
SARS-COV-2 RNA RESP QL NAA+PROBE: NOT DETECTED
SITE: NORMAL
SMUDGE CELLS BLD QL SMEAR: PRESENT
SODIUM SERPL-SCNC: 135 MMOL/L (ref 136–145)
SPHEROCYTES BLD QL SMEAR: ABNORMAL
WBC # BLD AUTO: 16.52 K/UL (ref 3.9–12.7)

## 2024-03-02 PROCEDURE — 25000003 PHARM REV CODE 250: Performed by: EMERGENCY MEDICINE

## 2024-03-02 PROCEDURE — 99223 1ST HOSP IP/OBS HIGH 75: CPT | Mod: ,,, | Performed by: INTERNAL MEDICINE

## 2024-03-02 PROCEDURE — 85610 PROTHROMBIN TIME: CPT

## 2024-03-02 PROCEDURE — 85025 COMPLETE CBC W/AUTO DIFF WBC: CPT

## 2024-03-02 PROCEDURE — 20600001 HC STEP DOWN PRIVATE ROOM

## 2024-03-02 PROCEDURE — 80053 COMPREHEN METABOLIC PANEL: CPT

## 2024-03-02 PROCEDURE — 83735 ASSAY OF MAGNESIUM: CPT

## 2024-03-02 PROCEDURE — 84100 ASSAY OF PHOSPHORUS: CPT

## 2024-03-02 PROCEDURE — 25000003 PHARM REV CODE 250

## 2024-03-02 PROCEDURE — 93010 ELECTROCARDIOGRAM REPORT: CPT | Mod: ,,, | Performed by: INTERNAL MEDICINE

## 2024-03-02 PROCEDURE — 83605 ASSAY OF LACTIC ACID: CPT

## 2024-03-02 PROCEDURE — 63600175 PHARM REV CODE 636 W HCPCS

## 2024-03-02 PROCEDURE — S4991 NICOTINE PATCH NONLEGEND: HCPCS | Performed by: STUDENT IN AN ORGANIZED HEALTH CARE EDUCATION/TRAINING PROGRAM

## 2024-03-02 PROCEDURE — 25000003 PHARM REV CODE 250: Performed by: STUDENT IN AN ORGANIZED HEALTH CARE EDUCATION/TRAINING PROGRAM

## 2024-03-02 PROCEDURE — 99900035 HC TECH TIME PER 15 MIN (STAT)

## 2024-03-02 PROCEDURE — 93005 ELECTROCARDIOGRAM TRACING: CPT

## 2024-03-02 PROCEDURE — 0241U SARS-COV2 (COVID) WITH FLU/RSV BY PCR: CPT | Performed by: STUDENT IN AN ORGANIZED HEALTH CARE EDUCATION/TRAINING PROGRAM

## 2024-03-02 RX ORDER — GLUCAGON 1 MG
1 KIT INJECTION
Status: DISCONTINUED | OUTPATIENT
Start: 2024-03-02 | End: 2024-03-06 | Stop reason: HOSPADM

## 2024-03-02 RX ORDER — IBUPROFEN 200 MG
24 TABLET ORAL
Status: DISCONTINUED | OUTPATIENT
Start: 2024-03-02 | End: 2024-03-06 | Stop reason: HOSPADM

## 2024-03-02 RX ORDER — THIAMINE HCL 100 MG
100 TABLET ORAL DAILY
Status: DISCONTINUED | OUTPATIENT
Start: 2024-03-02 | End: 2024-03-06 | Stop reason: HOSPADM

## 2024-03-02 RX ORDER — SODIUM,POTASSIUM PHOSPHATES 280-250MG
1 POWDER IN PACKET (EA) ORAL
Status: COMPLETED | OUTPATIENT
Start: 2024-03-02 | End: 2024-03-02

## 2024-03-02 RX ORDER — TRAZODONE HYDROCHLORIDE 100 MG/1
200 TABLET ORAL NIGHTLY
Status: DISCONTINUED | OUTPATIENT
Start: 2024-03-02 | End: 2024-03-06 | Stop reason: HOSPADM

## 2024-03-02 RX ORDER — LEVOTHYROXINE SODIUM 75 UG/1
75 TABLET ORAL
Status: DISCONTINUED | OUTPATIENT
Start: 2024-03-02 | End: 2024-03-06 | Stop reason: HOSPADM

## 2024-03-02 RX ORDER — LORAZEPAM 2 MG/ML
4 INJECTION INTRAMUSCULAR
Status: DISCONTINUED | OUTPATIENT
Start: 2024-03-02 | End: 2024-03-03

## 2024-03-02 RX ORDER — ENOXAPARIN SODIUM 100 MG/ML
40 INJECTION SUBCUTANEOUS EVERY 24 HOURS
Status: DISCONTINUED | OUTPATIENT
Start: 2024-03-02 | End: 2024-03-03

## 2024-03-02 RX ORDER — DIAZEPAM 5 MG/1
10 TABLET ORAL EVERY 6 HOURS
Status: DISCONTINUED | OUTPATIENT
Start: 2024-03-02 | End: 2024-03-03

## 2024-03-02 RX ORDER — FOLIC ACID 1 MG/1
1 TABLET ORAL DAILY
Status: DISCONTINUED | OUTPATIENT
Start: 2024-03-02 | End: 2024-03-06 | Stop reason: HOSPADM

## 2024-03-02 RX ORDER — IBUPROFEN 200 MG
1 TABLET ORAL DAILY
Status: DISCONTINUED | OUTPATIENT
Start: 2024-03-02 | End: 2024-03-06 | Stop reason: HOSPADM

## 2024-03-02 RX ORDER — PANTOPRAZOLE SODIUM 40 MG/1
40 TABLET, DELAYED RELEASE ORAL DAILY
Status: DISCONTINUED | OUTPATIENT
Start: 2024-03-02 | End: 2024-03-06 | Stop reason: HOSPADM

## 2024-03-02 RX ORDER — METOPROLOL TARTRATE 25 MG/1
25 TABLET, FILM COATED ORAL 2 TIMES DAILY
Status: DISCONTINUED | OUTPATIENT
Start: 2024-03-02 | End: 2024-03-06 | Stop reason: HOSPADM

## 2024-03-02 RX ORDER — GABAPENTIN 300 MG/1
300 CAPSULE ORAL 3 TIMES DAILY
Status: DISCONTINUED | OUTPATIENT
Start: 2024-03-02 | End: 2024-03-06 | Stop reason: HOSPADM

## 2024-03-02 RX ORDER — LORAZEPAM 2 MG/ML
2 INJECTION INTRAMUSCULAR
Status: DISCONTINUED | OUTPATIENT
Start: 2024-03-02 | End: 2024-03-02

## 2024-03-02 RX ORDER — MAGNESIUM SULFATE HEPTAHYDRATE 40 MG/ML
2 INJECTION, SOLUTION INTRAVENOUS
Status: COMPLETED | OUTPATIENT
Start: 2024-03-02 | End: 2024-03-02

## 2024-03-02 RX ORDER — FLUOXETINE 10 MG/1
10 CAPSULE ORAL DAILY
Status: DISCONTINUED | OUTPATIENT
Start: 2024-03-02 | End: 2024-03-04

## 2024-03-02 RX ORDER — DICYCLOMINE HYDROCHLORIDE 20 MG/1
20 TABLET ORAL EVERY 6 HOURS
Status: DISCONTINUED | OUTPATIENT
Start: 2024-03-02 | End: 2024-03-04

## 2024-03-02 RX ORDER — IBUPROFEN 200 MG
16 TABLET ORAL
Status: DISCONTINUED | OUTPATIENT
Start: 2024-03-02 | End: 2024-03-06 | Stop reason: HOSPADM

## 2024-03-02 RX ORDER — SODIUM CHLORIDE 0.9 % (FLUSH) 0.9 %
10 SYRINGE (ML) INJECTION
Status: DISCONTINUED | OUTPATIENT
Start: 2024-03-02 | End: 2024-03-02

## 2024-03-02 RX ADMIN — METOROPROLOL TARTRATE 5 MG: 5 INJECTION, SOLUTION INTRAVENOUS at 12:03

## 2024-03-02 RX ADMIN — Medication 1 PATCH: at 05:03

## 2024-03-02 RX ADMIN — THERA TABS 1 TABLET: TAB at 08:03

## 2024-03-02 RX ADMIN — POTASSIUM & SODIUM PHOSPHATES POWDER PACK 280-160-250 MG 1 PACKET: 280-160-250 PACK at 08:03

## 2024-03-02 RX ADMIN — DICYCLOMINE HYDROCHLORIDE 20 MG: 20 TABLET ORAL at 05:03

## 2024-03-02 RX ADMIN — POTASSIUM & SODIUM PHOSPHATES POWDER PACK 280-160-250 MG 1 PACKET: 280-160-250 PACK at 05:03

## 2024-03-02 RX ADMIN — ONDANSETRON 4 MG: 2 INJECTION INTRAMUSCULAR; INTRAVENOUS at 08:03

## 2024-03-02 RX ADMIN — ONDANSETRON 4 MG: 2 INJECTION INTRAMUSCULAR; INTRAVENOUS at 07:03

## 2024-03-02 RX ADMIN — DIAZEPAM 10 MG: 5 TABLET ORAL at 05:03

## 2024-03-02 RX ADMIN — METOPROLOL TARTRATE 25 MG: 25 TABLET, FILM COATED ORAL at 08:03

## 2024-03-02 RX ADMIN — ENOXAPARIN SODIUM 40 MG: 40 INJECTION SUBCUTANEOUS at 05:03

## 2024-03-02 RX ADMIN — PANTOPRAZOLE SODIUM 40 MG: 40 TABLET, DELAYED RELEASE ORAL at 08:03

## 2024-03-02 RX ADMIN — DICYCLOMINE HYDROCHLORIDE 20 MG: 20 TABLET ORAL at 11:03

## 2024-03-02 RX ADMIN — Medication 100 MG: at 08:03

## 2024-03-02 RX ADMIN — GABAPENTIN 300 MG: 300 CAPSULE ORAL at 08:03

## 2024-03-02 RX ADMIN — LORAZEPAM 2 MG: 2 INJECTION INTRAMUSCULAR; INTRAVENOUS at 07:03

## 2024-03-02 RX ADMIN — POTASSIUM & SODIUM PHOSPHATES POWDER PACK 280-160-250 MG 1 PACKET: 280-160-250 PACK at 11:03

## 2024-03-02 RX ADMIN — LORAZEPAM 2 MG: 2 INJECTION INTRAMUSCULAR; INTRAVENOUS at 01:03

## 2024-03-02 RX ADMIN — METOPROLOL TARTRATE 25 MG: 25 TABLET, FILM COATED ORAL at 09:03

## 2024-03-02 RX ADMIN — DEXTROSE MONOHYDRATE 250 ML: 100 INJECTION, SOLUTION INTRAVENOUS at 03:03

## 2024-03-02 RX ADMIN — GABAPENTIN 300 MG: 300 CAPSULE ORAL at 03:03

## 2024-03-02 RX ADMIN — DICYCLOMINE HYDROCHLORIDE 20 MG: 20 TABLET ORAL at 06:03

## 2024-03-02 RX ADMIN — LORAZEPAM 4 MG: 2 INJECTION INTRAMUSCULAR; INTRAVENOUS at 11:03

## 2024-03-02 RX ADMIN — MAGNESIUM SULFATE 2 G: 2 INJECTION INTRAVENOUS at 11:03

## 2024-03-02 RX ADMIN — FLUOXETINE 10 MG: 10 CAPSULE ORAL at 08:03

## 2024-03-02 RX ADMIN — FOLIC ACID 1 MG: 1 TABLET ORAL at 08:03

## 2024-03-02 RX ADMIN — LORAZEPAM 2 MG: 2 INJECTION INTRAMUSCULAR; INTRAVENOUS at 08:03

## 2024-03-02 RX ADMIN — ONDANSETRON 4 MG: 2 INJECTION INTRAMUSCULAR; INTRAVENOUS at 02:03

## 2024-03-02 RX ADMIN — DIAZEPAM 10 MG: 5 TABLET ORAL at 11:03

## 2024-03-02 RX ADMIN — POTASSIUM & SODIUM PHOSPHATES POWDER PACK 280-160-250 MG 1 PACKET: 280-160-250 PACK at 09:03

## 2024-03-02 RX ADMIN — SODIUM PHOSPHATE, MONOBASIC, MONOHYDRATE AND SODIUM PHOSPHATE, DIBASIC, ANHYDROUS 20.01 MMOL: 142; 276 INJECTION, SOLUTION INTRAVENOUS at 08:03

## 2024-03-02 RX ADMIN — LEVOTHYROXINE SODIUM 75 MCG: 75 TABLET ORAL at 05:03

## 2024-03-02 RX ADMIN — MAGNESIUM SULFATE 2 G: 2 INJECTION INTRAVENOUS at 08:03

## 2024-03-02 RX ADMIN — GABAPENTIN 300 MG: 300 CAPSULE ORAL at 09:03

## 2024-03-02 RX ADMIN — TRAZODONE HYDROCHLORIDE 200 MG: 100 TABLET ORAL at 09:03

## 2024-03-02 NOTE — ED NOTES
Medical team (Hemal Bliss MD), authorized additional dose of vailum 10 mg, patient has received 10mg of oral valium and 5mg IV valium.

## 2024-03-02 NOTE — HPI
Earl Abdul is a 63 y.o. female w/ a PMHx of EtOH use disorder, CLL not currently on any treatment, COPD, HTN, depression on multiple antidepressants, right breast cancer s/p bilateral mastectomy, hypothyroidism. Patient presents to the ED today for generalized fatigue and nausea.  Patient states that these are both chronic issues, but her nauseous becoming unbearable.  Patient was seen on 02/20 for similar complaints and was discharged w/ prescription for Zofran, which he states has not been helping her nausea.  She was admitted for similar complaints of alcohol withdrawal and atraumatic rhabdomyolysis from 2/10-2/14/24.  In the ED, the pt had signs of active EtOH withdrawal. Last drink was night prior to admission. Per history she drinks a couple glasses per day of vodka, goes through a few bottles a week. Pt was nauseated, but was not vomiting. Pt was treated with benzodiazepines & responded favorably. Pt had atrial fibrillation with RVR which responded to metoprolol IV x3.  She was admitted to the ICU and step-down to hospital medicine today.     She complains of left upper extremity weakness started around 1 week ago.  She denies shortness of breath, chest pain, palpitations, lightheadedness, dark stools, bleeding in urine.

## 2024-03-02 NOTE — H&P
[facility] - Emergency Dept  Critical Care Medicine  History & Physical    Patient Name: Earl Abdul  MRN: [id number]  Admission Date: 3/1/2024  Hospital Length of Stay: 0 days  Code Status: Full Code  Attending Physician: Shaq Jones MD   Primary Care Provider: Andrew Rodriguez MD   Principal Problem: <principal problem not specified>    Subjective:     HPI:  63 yof pmh substance abuse, hx of sarcoidosis, HLD, DT, withdrawal seizures presenting with confusion, nausea and concern for withdrawals. Last drink was night prior to admission, states she drinks a couple glasses per day of vodka, goes through a few bottles a week. Pt is nauseated, but has not vomiting. Difficult getting history due to confusion and lethargy.    In the ED: pt has been given multiple doses of diazepam and multiple boluses, WBC 28, hgb 10.2, bicarb 15, anion gap 27, glucose 53, AST 88, ALT 47. Lactic 2.6, alcohol 81. Pt went into afib with RVR responded to 3rd dose of metoprolol and was back in [facility]    Hospital/ICU Course:  No notes on file     Past Medical History:   Diagnosis Date    Alcoholism     c/b alcohol withdrawl seizures 7/2017    Anemia     Cancer of breast 10/2020    s/p bilateral mastectomy for  T1b N0 stage IA breast cancer October 2020    CLL (chronic lymphocytic leukemia) 09/30/2022 6/22/22 - PB flow cytometry  Immunophenotyping of peripheral blood detects a distinct kappa light chain restricted monoclonal B-cell population  (calculated at 2.44x10 9 /L, from the most recent CBC showing a total WBC of  7.35 K/uL with 61% total lymphocytes)  with a CLL phenotype (coexpression of CD19, CD5, CD23 and dim CD20). CD22 (FITC), FMC-7 and CD38 are negative in this population.    Controlled type 2 diabetes mellitus without complication, without long-term current use of insulin 11/30/2021    COPD (chronic obstructive pulmonary disease)     Depression     Diverticulitis     Fatty liver     GERD (gastroesophageal reflux  disease)     Hyperlipidemia     Hypertension     Pancreatitis     Peptic ulcer disease     Polysubstance abuse     Posterior reversible encephalopathy syndrome     Sarcoidosis of lung     over 30 yrs ago    Suicide attempt        Past Surgical History:   Procedure Laterality Date    APPENDECTOMY      BILATERAL MASTECTOMY Bilateral 10/29/2020    Procedure: MASTECTOMY, BILATERAL;  Surgeon: Baylee Kevin MD;  Location: [address];  Service: General;  Laterality: Bilateral;    BREAST REVISION SURGERY Bilateral 2/11/2021    Procedure: BREAST REVISION SURGERY;  Surgeon: Scottie Johnson MD;  Location: [address];  Service: Plastics;  Laterality: Bilateral;    COLONOSCOPY N/A 7/28/2017    Procedure: COLONOSCOPY;  Surgeon: Aaron Alvarado MD;  Location: [facility];  Service: Endoscopy;  Laterality: N/A;    ESOPHAGOGASTRODUODENOSCOPY  10/7/2016, 11/6/2014    2016 - gastritis, duodenitis, 2014 erosive gastritis    ESOPHAGOGASTRODUODENOSCOPY N/A 2/11/2020    Procedure: ESOPHAGOGASTRODUODENOSCOPY (EGD);  Surgeon: Fawn Garrido MD;  Location: [facility];  Service: Endoscopy;  Laterality: N/A;    ESOPHAGOGASTRODUODENOSCOPY N/A 4/19/2021    Procedure: EGD (ESOPHAGOGASTRODUODENOSCOPY);  Surgeon: Paramjit Martino MD;  Location: [facility];  Service: Endoscopy;  Laterality: N/A;    FLEXIBLE SIGMOIDOSCOPY  11/06/2014    colitis    HYSTERECTOMY      IMPLANTATION OF PERMANENT SACRAL NERVE STIMULATOR N/A 7/12/2022    Procedure: INSERTION, NEUROSTIMULATOR, PERMANENT, SACRAL;  Surgeon: Juaquin Edwards MD;  Location: [address];  Service: Urology;  Laterality: N/A;  1hr    INJECTION FOR SENTINEL NODE IDENTIFICATION Right 10/29/2020    Procedure: INJECTION, FOR SENTINEL NODE IDENTIFICATION;  Surgeon: Baylee Kevin MD;  Location: [address];  Service: General;  Laterality: Right;    INJECTION OF JOINT Right 10/10/2019    Procedure: Injection, Joint RIGHT ILIOPSOAS BURSA/TENDON INJECTION AND RIGHT GLUTEAL  TENDON INJECTION WITH STEROID AND LIDOCAINE;  Surgeon: Guillaume Rico MD;  Location: [facility];  Service: Pain Management;  Laterality: Right;  NEEDS CONSENT    INSERTION OF BREAST TISSUE EXPANDER Bilateral 10/29/2020    Procedure: INSERTION, TISSUE EXPANDER, BREAST;  Surgeon: Scottie Johnson MD;  Location: [facility] OR [address] [place] [address];  Service: Plastics;  Laterality: Bilateral;  Right breast: 1082 g  Left breast: 1076 g    LIPOSUCTION Bilateral 2/11/2021    Procedure: LIPOSUCTION;  Surgeon: Scottie Johnson MD;  Location: [facility] OR [address] [place] [address];  Service: Plastics;  Laterality: Bilateral;    mediastenoscopy      REPLACEMENT OF IMPLANT OF BREAST Bilateral 2/11/2021    Procedure: REPLACEMENT, IMPLANT, BREAST;  Surgeon: Scottie Johnson MD;  Location: [address];  Service: Plastics;  Laterality: Bilateral;    SENTINEL LYMPH NODE BIOPSY Right 10/29/2020    Procedure: BIOPSY, LYMPH NODE, SENTINEL;  Surgeon: Baylee Kevin MD;  Location: [address];  Service: General;  Laterality: Right;    TONSILLECTOMY N/A 1970    TUBAL LIGATION         Review of patient's allergies indicates:   Allergen Reactions    Lortab [hydrocodone-acetaminophen] Itching    Promethazine Itching and Other (See Comments)       Family History       Problem Relation (Age of Onset)    Breast cancer Maternal Aunt, Daughter    Colon cancer Maternal Uncle    Diabetes Father, Mother    Heart attack Father    Hypertension Father, Mother          Tobacco Use    Smoking status: Every Day     Current packs/day: 0.00     Average packs/day: 0.5 packs/day for 30.0 years (15.0 ttl pk-yrs)     Types: Vaping with nicotine, Cigarettes     Start date: 2/1/1991     Last attempt to quit: 2/1/2021     Years since quitting: 3.0    Smokeless tobacco: Never    Tobacco comments:     Patient is currently smoking 10 cigarettes a day, declines nicotine patches   Substance and Sexual Activity    Alcohol use: Yes     Comment: vodka daily (half a regular bottle) for  4 days    Drug use: Yes     Types: Marijuana     Comment: gummies    Sexual activity: Yes     Birth control/protection: Surgical      Review of Systems   Reason unable to perform ROS: AMS.   Gastrointestinal:  Positive for nausea. Negative for vomiting.   Psychiatric/Behavioral:  Positive for confusion. The patient is nervous/anxious.      Objective:     Vital Signs (Most Recent):  Temp: 99.2 °F (37.3 °C) (03/01/24 2100)  Pulse: (!) 118 (03/01/24 2144)  Resp: (!) 22 (03/01/24 2144)  BP: (!) 169/75 (03/01/24 2130)  SpO2: 100 % (03/01/24 2144) Vital Signs (24h Range):  Temp:  [97.4 °F (36.3 °C)-99.2 °F (37.3 °C)] 99.2 °F (37.3 °C)  Pulse:  [103-131] 118  Resp:  [18-22] 22  SpO2:  [93 %-100 %] 100 %  BP: (147-183)/(64-79) 169/75   Weight: 89.8 kg (198 lb)  Body mass index is 31.96 kg/m².      Intake/Output Summary (Last 24 hours) at 3/1/2024 2257  Last data filed at 3/1/2024 2145  Gross per 24 hour   Intake 1699 ml   Output --   Net 1699 ml          Physical Exam  Constitutional:       General: She is not in acute distress.     Appearance: She is ill-appearing.   HENT:      Mouth/Throat:      Mouth: Mucous membranes are dry.   Cardiovascular:      Rate and Rhythm: Tachycardia present. Rhythm irregular.   Pulmonary:      Effort: Pulmonary effort is normal. No tachypnea.   Abdominal:      General: Abdomen is flat. There is no distension.      Tenderness: There is no abdominal tenderness.   Musculoskeletal:      Comments: Moves all extremities    Skin:     General: Skin is warm and dry.   Neurological:      Mental Status: She is easily aroused. She is lethargic and confused.   Psychiatric:         Mood and Affect: Mood is anxious.         Cognition and Memory: She exhibits impaired recent memory and impaired remote memory.            Vents:     Lines/Drains/Airways       Peripheral Intravenous Line  Duration                  Peripheral IV - Single Lumen 03/01/24 2129 20 G Posterior;Right Forearm <1 day         Peripheral  IV - Single Lumen 03/01/24 2129 22 G Posterior;Right Hand <1 day                  Significant Labs:    CBC/Anemia Profile:  Recent Labs   Lab 03/01/24  1623   WBC 28.71*   HGB 10.2*   HCT 34.4*   *   MCV 88   RDW 18.3*        Chemistries:  Recent Labs   Lab 03/01/24  1623      K 5.0   CL 96   CO2 15*   BUN 13   CREATININE 0.8   CALCIUM 9.0   ALBUMIN 4.4   PROT 7.6   BILITOT 0.6   ALKPHOS 63   ALT 47*   AST 88*       All pertinent labs within the past 24 hours have been reviewed.    Significant Imaging: I have reviewed all pertinent imaging results/findings within the past 24 hours.  Assessment/Plan:     Psychiatric  Alcohol withdrawal  Pt has history of withdrawal symptoms. See alcohol use disorder    Alcohol use disorder, severe, dependence  Pt has extensive alcohol history. Pt also has history of withdrawal seizures in the past. Pt last drink was the night PTA. Pt normally drinks multiple glasses of vodka a day and goes through multiple bottles per week.     Plan   CIWA q4hrs   Diazepam 10mg IV if CIWA >10  Diazepam 10mg oral q6h scheduled, until regimen can be established   Folate   thiamine    Cardiac/Vascular  Atrial fibrillation  Pt presented to the hospital for alcohol withdrawal. While in the ER on telemetry patient's heart rate shot up into the 160s with irregular rhythm. Pt was feeling nauseated but no other symptoms noted. Pt does not know of a history of abnormal rhythm. Chads-vasc 4, Has-bled 2. No prior episodes noted in chart.     Plan  Metoprolol tartrate IV 5mg x3 doses. A fib broke after 3rd dose of metoprolol, back in NSR  If patient becomes hemodynamically unstable may need cardioversion  Cardiac monitoring  If concern stat EKG  Metoprolol tartrate 25mg BID          Endocrine  Type 2 diabetes mellitus with hypoglycemia  Pt has been dealing with hypoglycemia during hospital stay. Pt has required multiple bolus and sugary drinks. Most likely due to her alcohol use.    Plan   Hold  insulin while dealing with hypoglycemia  Hold metformin  D10 bolus if sugars below <60    GI  Nausea  Zofran prn for nausea, Qtc 436.         Critical Care Daily Checklist:    A: Awake: RASS Goal/Actual Goal:    Actual:     B: Spontaneous Breathing Trial Performed?     C: SAT & SBT Coordinated?  NA                      D: Delirium: CAM-ICU     E: Early Mobility Performed? Yes   F: Feeding Goal:    Status:     Current Diet Order   Procedures    Diet Adult Regular (IDDSI Level 7)      AS: Analgesia/Sedation None   T: Thromboembolic Prophylaxis enoxaparin   H: HOB > 300 Yes   U: Stress Ulcer Prophylaxis (if needed) None   G: Glucose Control hypoglycemic   B: Bowel Function     I: Indwelling Catheter (Lines & Boudreaux) Necessity PIV   D: De-escalation of Antimicrobials/Pharmacotherapies None    Plan for the day/ETD Starting benzos for withdrawal    Code Status:  Family/Goals of Care: Full Code  Discussed       Critical secondary to Patient has a condition that poses threat to life and bodily function: Alcohol withdrawal    Critical care was time spent personally by me on the following activities: development of treatment plan with patient or surrogate and bedside caregivers, discussions with consultants, evaluation of patient's response to treatment, examination of patient, ordering and performing treatments and interventions, ordering and review of laboratory studies, ordering and review of radiographic studies, pulse oximetry, re-evaluation of patient's condition. This critical care time did not overlap with that of any other provider or involve time for any procedures.     Aaron Wyatt, DO  Critical Care Medicine  [facility] - Emergency Dept

## 2024-03-02 NOTE — SUBJECTIVE & OBJECTIVE
Interval History:  She complains of tremors of whole-body, mild headache, nervousness, nausea, agitation. Also,  reports 1 week weakness of left upper extremity.    Review of Systems   Constitutional:  Positive for fatigue. Negative for fever.   HENT:  Negative for congestion, ear pain and sore throat.    Eyes:  Negative for visual disturbance.   Respiratory:  Negative for cough, shortness of breath and wheezing.    Cardiovascular:  Negative for chest pain, palpitations and leg swelling.   Gastrointestinal:  Positive for nausea and vomiting. Negative for abdominal pain, constipation and diarrhea.   Genitourinary:  Negative for difficulty urinating, dysuria and hematuria.   Musculoskeletal:  Negative for arthralgias, back pain and joint swelling.   Skin:  Negative for rash.   Neurological:  Positive for tremors, weakness and headaches. Negative for dizziness, seizures, syncope, speech difficulty and light-headedness.   Psychiatric/Behavioral:  Positive for agitation. Negative for hallucinations. The patient is nervous/anxious.      Objective:     Vital Signs (Most Recent):  Temp: 98.6 °F (37 °C) (03/02/24 1200)  Pulse: 75 (03/02/24 1500)  Resp: (!) 34 (03/02/24 1500)  BP: 96/68 (03/02/24 1300)  SpO2: 95 % (03/02/24 1500) Vital Signs (24h Range):  Temp:  [97.4 °F (36.3 °C)-99.2 °F (37.3 °C)] 98.6 °F (37 °C)  Pulse:  [] 75  Resp:  [18-46] 34  SpO2:  [93 %-100 %] 95 %  BP: ()/(53-84) 96/68     Weight: 89.8 kg (197 lb 15.6 oz)  Body mass index is 31.95 kg/m².    Intake/Output Summary (Last 24 hours) at 3/2/2024 1548  Last data filed at 3/2/2024 0333  Gross per 24 hour   Intake 1699 ml   Output 700 ml   Net 999 ml         Physical Exam  Constitutional:       General: She is not in acute distress.     Appearance: She is ill-appearing.   HENT:      Head: Normocephalic and atraumatic.      Mouth/Throat:      Mouth: Mucous membranes are moist.   Eyes:      Extraocular Movements: Extraocular movements intact.       Conjunctiva/sclera: Conjunctivae normal.      Pupils: Pupils are equal, round, and reactive to light.   Neck:      Vascular: No carotid bruit.   Cardiovascular:      Rate and Rhythm: Normal rate and regular rhythm.      Heart sounds: No murmur heard.  Pulmonary:      Effort: No respiratory distress.      Breath sounds: No wheezing or rhonchi.   Abdominal:      General: Bowel sounds are normal.      Tenderness: There is no abdominal tenderness.   Musculoskeletal:         General: No swelling or tenderness.      Cervical back: No rigidity.      Right lower leg: No edema.      Left lower leg: No edema.      Comments: Tremors of BL UE and LE +   Skin:     General: Skin is warm.      Capillary Refill: Capillary refill takes less than 2 seconds.      Coloration: Skin is not jaundiced.      Findings: No rash.   Neurological:      Mental Status: She is alert and oriented to person, place, and time.      Cranial Nerves: No cranial nerve deficit.      Motor: Weakness present.      Comments: Left upper extremity power 2/5  Right upper extremity, left lower extremity, right lower extremity power 5 /5             Significant Labs: All pertinent labs within the past 24 hours have been reviewed.    Significant Imaging: I have reviewed all pertinent imaging results/findings within the past 24 hours.

## 2024-03-02 NOTE — ASSESSMENT & PLAN NOTE
Chronic, controlled. Latest blood pressure and vitals reviewed-     Temp:  [97.4 °F (36.3 °C)-99.2 °F (37.3 °C)]   Pulse:  []   Resp:  [18-46]   BP: ()/(53-84)   SpO2:  [93 %-100 %] .   Home meds for hypertension were reviewed and noted below.   Hypertension Medications               losartan-hydrochlorothiazide 100-25 mg (HYZAAR) 100-25 mg per tablet Take 1 tablet by mouth once daily.            While in the hospital, will manage blood pressure as follows;   Due to fluctuation in blood pressure and starting of metoprolol for atrial fibrillation we will monitor  We will restart her home medicine losartan-HCTZ according to the blood pressure response    Will utilize p.r.n. blood pressure medication only if patient's blood pressure greater than 180/110 and she develops symptoms such as worsening chest pain or shortness of breath.

## 2024-03-02 NOTE — ASSESSMENT & PLAN NOTE
- CIWA score 10  - AST 82, ALT 47 consistent with alcohol induced liver injury  - alcohol level 81  - diazepam 10 mg p.o. q.6  - lorazepam 2 mg q.2h PRN  - thiamine 100 mg p.o.  - folic acid 1 mg  - monitor with vitals, CIWA score q.6h

## 2024-03-02 NOTE — ASSESSMENT & PLAN NOTE
Pt presented to the hospital for alcohol withdrawal. While in the ER on telemetry patient's heart rate shot up into the 160s with irregular rhythm. Pt was feeling nauseated but no other symptoms noted. Pt does not know of a history of abnormal rhythm. Chads-vasc 4, Has-bled 2. No prior episodes noted in chart.     Plan  Metoprolol tartrate IV 5mg x3 doses. A fib broke after 3rd dose of metoprolol, back in NSR  If patient becomes hemodynamically unstable may need cardioversion  Cardiac monitoring  If concern stat EKG  Metoprolol tartrate 25mg BID

## 2024-03-02 NOTE — ASSESSMENT & PLAN NOTE
Patient has Abnormal Phosphorus: hypophosphatemia. Will continue to monitor electrolytes closely. Will replace the affected electrolytes and repeat labs to be done after interventions completed. The patient's phosphorus results have been reviewed and are listed below.  Recent Labs   Lab 03/02/24  0327   PHOS 1.4*

## 2024-03-02 NOTE — ASSESSMENT & PLAN NOTE
Pt has extensive alcohol history. Pt also has history of withdrawal seizures in the past. Pt last drink was the night PTA. Pt normally drinks multiple glasses of vodka a day and goes through multiple bottles per week.     Plan   CIWA q4hrs   Diazepam 10mg IV if CIWA >10  Diazepam 10mg oral q6h scheduled, until regimen can be established   Folate   thiamine

## 2024-03-02 NOTE — ASSESSMENT & PLAN NOTE
- recent onset of new left upper extremity weakness   - Obtain CT head   - Eliquis 5 mg, atorvastatin 40 mg to be considered after the CT head report

## 2024-03-02 NOTE — ED NOTES
Medical team (Hemal Bliss MD) okay with additional dose of diazepam, does not need anything at this time,

## 2024-03-02 NOTE — ASSESSMENT & PLAN NOTE
Pt has been dealing with hypoglycemia during hospital stay. Pt has required multiple bolus and sugary drinks. Most likely due to her alcohol use.    Plan   Hold insulin while dealing with hypoglycemia  Hold metformin  D10 bolus if sugars below <60

## 2024-03-02 NOTE — CONSULTS
Patient seen and evaluated by critical care medicine. To be admitted to ICU for further management. Full H&P to follow.     Shu Ojeda, RISHI  Critical Care Medicine   03/02/2024

## 2024-03-02 NOTE — ED NOTES
Report received from Leidy Richardson RN. Assume care of pt. Pt resting comfortably and independently repositioned in stretcher with bed locked in lowest position for safety. NAD noted at this time. Respirations even and unlabored and visible chest rise noted.  Patient offered bathroom assistance and denies need at this time. Pt instructed to call if assistance is needed. Pt on continuous cardiac, BP, and O2 monitoring. Call light within reach. Family at bedside. No needs at this time. Will continue to monitor.

## 2024-03-02 NOTE — HPI
63 yof pmh substance abuse, hx of sarcoidosis, HLD, DT, withdrawal seizures presenting with confusion, nausea and concern for withdrawals. Last drink was night prior to admission, states she drinks a couple glasses per day of vodka, goes through a few bottles a week. Pt is nauseated, but has not vomiting. Difficult getting history due to confusion and lethargy.    In the ED: pt has been given multiple doses of diazepam and multiple boluses, WBC 28, hgb 10.2, bicarb 15, anion gap 27, glucose 53, AST 88, ALT 47. Lactic 2.6, alcohol 81. Pt went into afib with RVR responded to 3rd dose of metoprolol and was back in NSR

## 2024-03-02 NOTE — PROGRESS NOTES
[facility]  Hospital Medicine  Progress Note    Patient Name: Earl Abdul  MRN: [id number]  Patient Class: IP- Inpatient   Admission Date: 3/1/2024  Length of Stay: 0 days  Attending Physician: Shaq Jones MD  Primary Care Provider: Andrew Rodriguez MD        Subjective:     Principal Problem:Alcohol withdrawal        HPI:  Earl Abdul is a 63 y.o. female w/ a PMHx of EtOH use disorder, CLL not currently on any treatment, COPD, HTN, depression on multiple antidepressants, right breast cancer s/p bilateral mastectomy, hypothyroidism. Patient presents to the ED today for generalized fatigue and nausea.  Patient states that these are both chronic issues, but her nauseous becoming unbearable.  Patient was seen on 02/20 for similar complaints and was discharged w/ prescription for Zofran, which he states has not been helping her nausea.  She was admitted for similar complaints of alcohol withdrawal and atraumatic rhabdomyolysis from 2/10-2/14/24.  In the ED, the pt had signs of active EtOH withdrawal. Last drink was night prior to admission. Per history she drinks a couple glasses per day of vodka, goes through a few bottles a week. Pt was nauseated, but was not vomiting. Pt was treated with benzodiazepines & responded favorably. Pt had atrial fibrillation with RVR which responded to metoprolol IV x3.  She was admitted to the ICU and step-down to hospital medicine today.     She complains of left upper extremity weakness started around 1 week ago.  She denies shortness of breath, chest pain, palpitations, lightheadedness, dark stools, bleeding in urine.    Overview/Hospital Course:  Treated with benzodiazepine in ER for alcohol withdrawal, and atrial fibrillation with RVR with metoprolol in ICU.  Patient has new onset of atrial fibrillation.  One-week onset of left upper extremity weakness.    Interval History:  She complains of tremors of whole-body, mild headache, nervousness,  nausea, agitation. Also,  reports 1 week weakness of left upper extremity.    Review of Systems   Constitutional:  Positive for fatigue. Negative for fever.   HENT:  Negative for congestion, ear pain and sore throat.    Eyes:  Negative for visual disturbance.   Respiratory:  Negative for cough, shortness of breath and wheezing.    Cardiovascular:  Negative for chest pain, palpitations and leg swelling.   Gastrointestinal:  Positive for nausea and vomiting. Negative for abdominal pain, constipation and diarrhea.   Genitourinary:  Negative for difficulty urinating, dysuria and hematuria.   Musculoskeletal:  Negative for arthralgias, back pain and joint swelling.   Skin:  Negative for rash.   Neurological:  Positive for tremors, weakness and headaches. Negative for dizziness, seizures, syncope, speech difficulty and light-headedness.   Psychiatric/Behavioral:  Positive for agitation. Negative for hallucinations. The patient is nervous/anxious.      Objective:     Vital Signs (Most Recent):  Temp: 98.6 °F (37 °C) (03/02/24 1200)  Pulse: 75 (03/02/24 1500)  Resp: (!) 34 (03/02/24 1500)  BP: 96/68 (03/02/24 1300)  SpO2: 95 % (03/02/24 1500) Vital Signs (24h Range):  Temp:  [97.4 °F (36.3 °C)-99.2 °F (37.3 °C)] 98.6 °F (37 °C)  Pulse:  [] 75  Resp:  [18-46] 34  SpO2:  [93 %-100 %] 95 %  BP: ()/(53-84) 96/68     Weight: 89.8 kg (197 lb 15.6 oz)  Body mass index is 31.95 kg/m².    Intake/Output Summary (Last 24 hours) at 3/2/2024 1548  Last data filed at 3/2/2024 0333  Gross per 24 hour   Intake 1699 ml   Output 700 ml   Net 999 ml         Physical Exam  Constitutional:       General: She is not in acute distress.     Appearance: She is ill-appearing.   HENT:      Head: Normocephalic and atraumatic.      Mouth/Throat:      Mouth: Mucous membranes are moist.   Eyes:      Extraocular Movements: Extraocular movements intact.      Conjunctiva/sclera: Conjunctivae normal.      Pupils: Pupils are equal, round, and  reactive to light.   Neck:      Vascular: No carotid bruit.   Cardiovascular:      Rate and Rhythm: Normal rate and regular rhythm.      Heart sounds: No murmur heard.  Pulmonary:      Effort: No respiratory distress.      Breath sounds: No wheezing or rhonchi.   Abdominal:      General: Bowel sounds are normal.      Tenderness: There is no abdominal tenderness.   Musculoskeletal:         General: No swelling or tenderness.      Cervical back: No rigidity.      Right lower leg: No edema.      Left lower leg: No edema.      Comments: Tremors of BL UE and LE +   Skin:     General: Skin is warm.      Capillary Refill: Capillary refill takes less than 2 seconds.      Coloration: Skin is not jaundiced.      Findings: No rash.   Neurological:      Mental Status: She is alert and oriented to person, place, and time.      Cranial Nerves: No cranial nerve deficit.      Motor: Weakness present.      Comments: Left upper extremity power 2/5  Right upper extremity, left lower extremity, right lower extremity power 5 /5             Significant Labs: All pertinent labs within the past 24 hours have been reviewed.    Significant Imaging: I have reviewed all pertinent imaging results/findings within the past 24 hours.    Assessment/Plan:      * Alcohol withdrawal  - CIWA score 10  - AST 82, ALT 47 consistent with alcohol induced liver injury  - alcohol level 81  - diazepam 10 mg p.o. q.6  - lorazepam 2 mg q.2h PRN  - thiamine 100 mg p.o.  - folic acid 1 mg  - monitor with vitals, CIWA score q.6h        Atrial fibrillation  - Patient with Paroxysmal (<7 days) atrial fibrillation which is controlled currently with Beta Blocker. Patient is currently in sinus rhythm.[id number] Score: 4. HASBLED Score: 2   Anticoagulation with Eliquis to be started after the CT scan Head report  - EKG 3/1/24 showed atrial fibrillation with RVR  - TTE echocardiogram ordered awaiting to be performed  - we will adjust the dose of metoprolol according to  the blood pressure and atrial fibrillation  - consider consulting cardiology if any abnormality in echocardiogram otherwise outpatient workup with cardiology, sleep physician for VINICIO, endocrinology for subclinical hyperthyroidism    Weakness of left upper extremity  - recent onset of new left upper extremity weakness   - Obtain CT head   - Eliquis 5 mg, atorvastatin 40 mg to be considered after the CT head report             Type 2 diabetes mellitus with hypoglycemia  Patient's FSGs are uncontrolled due to hypoglycemia on current medication regimen.  Last A1c reviewed-   Lab Results   Component Value Date    HGBA1C 6.0 (H) 02/11/2024     Most recent fingerstick glucose reviewed-   Recent Labs   Lab 03/02/24  0038 03/02/24  0322 03/02/24  0417 03/02/24  0646   POCTGLUCOSE 102 67* 209* 95     Current correctional scale  None   anti-hyperglycemic dose as follows-   Due to hypoglycemic episodes hold insulin at the moment   We will monitor and restart with low-dose insulin sliding scale  Currently hypoglycemia protocol in place  Antihyperglycemics (From admission, onward)      None          Hold Oral hypoglycemics while patient is in the hospital.    Subclinical hyperthyroidism  - encourage to follow with endocrinology as outpatient for more evaluation due to paroxysmal atrial fibrillation new onset  - TSH 0.203, FT$ 1.12      Anemia, chronic disease  Patient's anemia is currently controlled. Has not received any PRBCs to date. Etiology likely d/t chronic disease due to Malignancy  Current CBC reviewed-   Lab Results   Component Value Date    HGB 9.8 (L) 03/02/2024    HCT 33.1 (L) 03/02/2024     Monitor serial CBC and transfuse if patient becomes hemodynamically unstable, symptomatic or H/H drops below 7/21.    Essential hypertension  Chronic, controlled. Latest blood pressure and vitals reviewed-     Temp:  [97.4 °F (36.3 °C)-99.2 °F (37.3 °C)]   Pulse:  []   Resp:  [18-46]   BP: ()/(53-84)   SpO2:  [93  %-100 %] .   Home meds for hypertension were reviewed and noted below.   Hypertension Medications               losartan-hydrochlorothiazide 100-25 mg (HYZAAR) 100-25 mg per tablet Take 1 tablet by mouth once daily.            While in the hospital, will manage blood pressure as follows;   Due to fluctuation in blood pressure and starting of metoprolol for atrial fibrillation we will monitor  We will restart her home medicine losartan-HCTZ according to the blood pressure response    Will utilize p.r.n. blood pressure medication only if patient's blood pressure greater than 180/110 and she develops symptoms such as worsening chest pain or shortness of breath.    Tobacco abuse  Assistance with smoking cessation was offered, including:  []  Medications  [x]  Counseling  []  Printed Information on Smoking Cessation  []  Referral to a Smoking Cessation Program    Patient was counseled regarding smoking for >10 minutes.       Hypophosphatemia  Patient has Abnormal Phosphorus: hypophosphatemia. Will continue to monitor electrolytes closely. Will replace the affected electrolytes and repeat labs to be done after interventions completed. The patient's phosphorus results have been reviewed and are listed below.  Recent Labs   Lab 03/02/24  0327   PHOS 1.4*        Nausea  - pantoprazole 40 p.o. daily   - Ondansetron 4 mg q8PRN        VTE Risk Mitigation (From admission, onward)           Ordered     enoxaparin injection 40 mg  Daily         03/02/24 0005     IP VTE HIGH RISK PATIENT  Once         03/02/24 0005     Place sequential compression device  Until discontinued         03/02/24 0005                    Discharge Planning   BECCA:      Code Status: Full Code   Is the patient medically ready for discharge?:     Reason for patient still in hospital (select all that apply): Patient unstable, Patient new problem, Patient trending condition, Treatment, and Imaging                     Mustapha Hyde MD  Department of Hospital  Medicine   [facility]

## 2024-03-02 NOTE — ED NOTES
Pt observed getting dressed and attempting to elope with IV in place. Pt educated by RN that there are medications that were ordered. MD notified. MD at bedside and educated pt on treatment plan. Pt redirected and medications pulled to administer at this time.

## 2024-03-02 NOTE — ASSESSMENT & PLAN NOTE
Assistance with smoking cessation was offered, including:  []  Medications  [x]  Counseling  []  Printed Information on Smoking Cessation  []  Referral to a Smoking Cessation Program    Patient was counseled regarding smoking for >10 minutes.

## 2024-03-02 NOTE — HOSPITAL COURSE
Pt to presented to the ED on 3/1/2024 with complaint of nausea, confusion, & body aches. In the ED, the pt had signs of active EtOH withdrawal. Last drink was night prior to admission. Per history she drinks a couple glasses per day of vodka, goes through a few bottles a week. Pt was nauseated, but was not vomiting. Pt was treated with benzodiazepines & responded favorably. Pt had some atrial fib with RVR which responded to metoprolol IV. Now in NSR. Continued on valium taper and has remained stable with improvement of withdrawal symptoms. She will be discharged home with plan to go to drug rehab in [facility] tomorrow. Follow up with cardiology and addiction psych.

## 2024-03-02 NOTE — ASSESSMENT & PLAN NOTE
- encourage to follow with endocrinology as outpatient for more evaluation due to paroxysmal atrial fibrillation new onset  - TSH 0.203, FT$ 1.12

## 2024-03-02 NOTE — ASSESSMENT & PLAN NOTE
Patient's FSGs are uncontrolled due to hypoglycemia on current medication regimen.  Last A1c reviewed-   Lab Results   Component Value Date    HGBA1C 6.0 (H) 02/11/2024     Most recent fingerstick glucose reviewed-   Recent Labs   Lab 03/02/24  0038 03/02/24  0322 03/02/24  0417 03/02/24  0646   POCTGLUCOSE 102 67* 209* 95     Current correctional scale  None   anti-hyperglycemic dose as follows-   Due to hypoglycemic episodes hold insulin at the moment   We will monitor and restart with low-dose insulin sliding scale  Currently hypoglycemia protocol in place  Antihyperglycemics (From admission, onward)      None          Hold Oral hypoglycemics while patient is in the hospital.

## 2024-03-02 NOTE — ASSESSMENT & PLAN NOTE
- Patient with Paroxysmal (<7 days) atrial fibrillation which is controlled currently with Beta Blocker. Patient is currently in sinus rhythm.[id number] Score: 4. HASBLED Score: 2   Anticoagulation with Eliquis to be started after the CT scan Head report  - EKG 3/1/24 showed atrial fibrillation with RVR  - TTE echocardiogram ordered awaiting to be performed  - we will adjust the dose of metoprolol according to the blood pressure and atrial fibrillation  - consider consulting cardiology if any abnormality in echocardiogram otherwise outpatient workup with cardiology, sleep physician for VINICIO, endocrinology for subclinical hyperthyroidism

## 2024-03-02 NOTE — SUBJECTIVE & OBJECTIVE
Past Medical History:   Diagnosis Date    Alcoholism     c/b alcohol withdrawl seizures 7/2017    Anemia     Cancer of breast 10/2020    s/p bilateral mastectomy for  T1b N0 stage IA breast cancer October 2020    CLL (chronic lymphocytic leukemia) 09/30/2022 6/22/22 - PB flow cytometry  Immunophenotyping of peripheral blood detects a distinct kappa light chain restricted monoclonal B-cell population  (calculated at 2.44x10 9 /L, from the most recent CBC showing a total WBC of  7.35 K/uL with 61% total lymphocytes)  with a CLL phenotype (coexpression of CD19, CD5, CD23 and dim CD20). CD22 (FITC), FMC-7 and CD38 are negative in this population.    Controlled type 2 diabetes mellitus without complication, without long-term current use of insulin 11/30/2021    COPD (chronic obstructive pulmonary disease)     Depression     Diverticulitis     Fatty liver     GERD (gastroesophageal reflux disease)     Hyperlipidemia     Hypertension     Pancreatitis     Peptic ulcer disease     Polysubstance abuse     Posterior reversible encephalopathy syndrome     Sarcoidosis of lung     over 30 yrs ago    Suicide attempt        Past Surgical History:   Procedure Laterality Date    APPENDECTOMY      BILATERAL MASTECTOMY Bilateral 10/29/2020    Procedure: MASTECTOMY, BILATERAL;  Surgeon: Baylee Kevin MD;  Location: [address];  Service: General;  Laterality: Bilateral;    BREAST REVISION SURGERY Bilateral 2/11/2021    Procedure: BREAST REVISION SURGERY;  Surgeon: Scottie Johnson MD;  Location: [address];  Service: Plastics;  Laterality: Bilateral;    COLONOSCOPY N/A 7/28/2017    Procedure: COLONOSCOPY;  Surgeon: Aaron Alvarado MD;  Location: [facility];  Service: Endoscopy;  Laterality: N/A;    ESOPHAGOGASTRODUODENOSCOPY  10/7/2016, 11/6/2014    2016 - gastritis, duodenitis, 2014 erosive gastritis    ESOPHAGOGASTRODUODENOSCOPY N/A 2/11/2020    Procedure: ESOPHAGOGASTRODUODENOSCOPY (EGD);  Surgeon: Fawn Mon  MD Hema;  Location: [facility];  Service: Endoscopy;  Laterality: N/A;    ESOPHAGOGASTRODUODENOSCOPY N/A 4/19/2021    Procedure: EGD (ESOPHAGOGASTRODUODENOSCOPY);  Surgeon: Paramjit Martino MD;  Location: [facility] ENDO;  Service: Endoscopy;  Laterality: N/A;    FLEXIBLE SIGMOIDOSCOPY  11/06/2014    colitis    HYSTERECTOMY      IMPLANTATION OF PERMANENT SACRAL NERVE STIMULATOR N/A 7/12/2022    Procedure: INSERTION, NEUROSTIMULATOR, PERMANENT, SACRAL;  Surgeon: Juaquin Edwards MD;  Location: [address];  Service: Urology;  Laterality: N/A;  1hr    INJECTION FOR SENTINEL NODE IDENTIFICATION Right 10/29/2020    Procedure: INJECTION, FOR SENTINEL NODE IDENTIFICATION;  Surgeon: Baylee Kevin MD;  Location: [address];  Service: General;  Laterality: Right;    INJECTION OF JOINT Right 10/10/2019    Procedure: Injection, Joint RIGHT ILIOPSOAS BURSA/TENDON INJECTION AND RIGHT GLUTEAL TENDON INJECTION WITH STEROID AND LIDOCAINE;  Surgeon: Guillaume Rico MD;  Location: [facility];  Service: Pain Management;  Laterality: Right;  NEEDS CONSENT    INSERTION OF BREAST TISSUE EXPANDER Bilateral 10/29/2020    Procedure: INSERTION, TISSUE EXPANDER, BREAST;  Surgeon: Scottie Johnson MD;  Location: [address];  Service: Plastics;  Laterality: Bilateral;  Right breast: 1082 g  Left breast: 1076 g    LIPOSUCTION Bilateral 2/11/2021    Procedure: LIPOSUCTION;  Surgeon: Scottie Johnson MD;  Location: [address];  Service: Plastics;  Laterality: Bilateral;    mediastenoscopy      REPLACEMENT OF IMPLANT OF BREAST Bilateral 2/11/2021    Procedure: REPLACEMENT, IMPLANT, BREAST;  Surgeon: Scottie Johnson MD;  Location: [facility] OR [address] [place] [address];  Service: Plastics;  Laterality: Bilateral;    SENTINEL LYMPH NODE BIOPSY Right 10/29/2020    Procedure: BIOPSY, LYMPH NODE, SENTINEL;  Surgeon: Baylee Kevin MD;  Location: [address];  Service: General;  Laterality: Right;    TONSILLECTOMY N/A 1970    TUBAL  LIGATION         Review of patient's allergies indicates:   Allergen Reactions    Lortab [hydrocodone-acetaminophen] Itching    Promethazine Itching and Other (See Comments)       Family History       Problem Relation (Age of Onset)    Breast cancer Maternal Aunt, Daughter    Colon cancer Maternal Uncle    Diabetes Father, Mother    Heart attack Father    Hypertension Father, Mother          Tobacco Use    Smoking status: Every Day     Current packs/day: 0.00     Average packs/day: 0.5 packs/day for 30.0 years (15.0 ttl pk-yrs)     Types: Vaping with nicotine, Cigarettes     Start date: 2/1/1991     Last attempt to quit: 2/1/2021     Years since quitting: 3.0    Smokeless tobacco: Never    Tobacco comments:     Patient is currently smoking 10 cigarettes a day, declines nicotine patches   Substance and Sexual Activity    Alcohol use: Yes     Comment: vodka daily (half a regular bottle) for 4 days    Drug use: Yes     Types: Marijuana     Comment: gummies    Sexual activity: Yes     Birth control/protection: Surgical      Review of Systems   Reason unable to perform ROS: AMS.   Gastrointestinal:  Positive for nausea. Negative for vomiting.   Psychiatric/Behavioral:  Positive for confusion. The patient is nervous/anxious.      Objective:     Vital Signs (Most Recent):  Temp: 99.2 °F (37.3 °C) (03/01/24 2100)  Pulse: (!) 118 (03/01/24 2144)  Resp: (!) 22 (03/01/24 2144)  BP: (!) 169/75 (03/01/24 2130)  SpO2: 100 % (03/01/24 2144) Vital Signs (24h Range):  Temp:  [97.4 °F (36.3 °C)-99.2 °F (37.3 °C)] 99.2 °F (37.3 °C)  Pulse:  [103-131] 118  Resp:  [18-22] 22  SpO2:  [93 %-100 %] 100 %  BP: (147-183)/(64-79) 169/75   Weight: 89.8 kg (198 lb)  Body mass index is 31.96 kg/m².      Intake/Output Summary (Last 24 hours) at 3/1/2024 2257  Last data filed at 3/1/2024 2145  Gross per 24 hour   Intake 1699 ml   Output --   Net 1699 ml          Physical Exam  Constitutional:       General: She is not in acute distress.      Appearance: She is ill-appearing.   HENT:      Mouth/Throat:      Mouth: Mucous membranes are dry.   Cardiovascular:      Rate and Rhythm: Tachycardia present. Rhythm irregular.   Pulmonary:      Effort: Pulmonary effort is normal. No tachypnea.   Abdominal:      General: Abdomen is flat. There is no distension.      Tenderness: There is no abdominal tenderness.   Musculoskeletal:      Comments: Moves all extremities    Skin:     General: Skin is warm and dry.   Neurological:      Mental Status: She is easily aroused. She is lethargic and confused.   Psychiatric:         Mood and Affect: Mood is anxious.         Cognition and Memory: She exhibits impaired recent memory and impaired remote memory.            Vents:     Lines/Drains/Airways       Peripheral Intravenous Line  Duration                  Peripheral IV - Single Lumen 03/01/24 2129 20 G Posterior;Right Forearm <1 day         Peripheral IV - Single Lumen 03/01/24 2129 22 G Posterior;Right Hand <1 day                  Significant Labs:    CBC/Anemia Profile:  Recent Labs   Lab 03/01/24  1623   WBC 28.71*   HGB 10.2*   HCT 34.4*   *   MCV 88   RDW 18.3*        Chemistries:  Recent Labs   Lab 03/01/24  1623      K 5.0   CL 96   CO2 15*   BUN 13   CREATININE 0.8   CALCIUM 9.0   ALBUMIN 4.4   PROT 7.6   BILITOT 0.6   ALKPHOS 63   ALT 47*   AST 88*       All pertinent labs within the past 24 hours have been reviewed.    Significant Imaging: I have reviewed all pertinent imaging results/findings within the past 24 hours.

## 2024-03-02 NOTE — ASSESSMENT & PLAN NOTE
Patient's anemia is currently controlled. Has not received any PRBCs to date. Etiology likely d/t chronic disease due to Malignancy  Current CBC reviewed-   Lab Results   Component Value Date    HGB 9.8 (L) 03/02/2024    HCT 33.1 (L) 03/02/2024     Monitor serial CBC and transfuse if patient becomes hemodynamically unstable, symptomatic or H/H drops below 7/21.

## 2024-03-03 LAB
ALBUMIN SERPL BCP-MCNC: 3.7 G/DL (ref 3.5–5.2)
ALP SERPL-CCNC: 61 U/L (ref 55–135)
ALT SERPL W/O P-5'-P-CCNC: 45 U/L (ref 10–44)
ANION GAP SERPL CALC-SCNC: 14 MMOL/L (ref 8–16)
AST SERPL-CCNC: 60 U/L (ref 10–40)
BASOPHILS # BLD AUTO: 0.02 K/UL (ref 0–0.2)
BASOPHILS NFR BLD: 0.3 % (ref 0–1.9)
BILIRUB SERPL-MCNC: 0.5 MG/DL (ref 0.1–1)
BUN SERPL-MCNC: 6 MG/DL (ref 8–23)
CALCIUM SERPL-MCNC: 8.9 MG/DL (ref 8.7–10.5)
CHLORIDE SERPL-SCNC: 96 MMOL/L (ref 95–110)
CO2 SERPL-SCNC: 27 MMOL/L (ref 23–29)
CREAT SERPL-MCNC: 0.8 MG/DL (ref 0.5–1.4)
DIFFERENTIAL METHOD BLD: ABNORMAL
EOSINOPHIL # BLD AUTO: 0.1 K/UL (ref 0–0.5)
EOSINOPHIL NFR BLD: 1 % (ref 0–8)
ERYTHROCYTE [DISTWIDTH] IN BLOOD BY AUTOMATED COUNT: 18.3 % (ref 11.5–14.5)
EST. GFR  (NO RACE VARIABLE): >60 ML/MIN/1.73 M^2
ETHANOL SERPL-MCNC: <10 MG/DL
GLUCOSE SERPL-MCNC: 124 MG/DL (ref 70–110)
HCT VFR BLD AUTO: 32.6 % (ref 37–48.5)
HGB BLD-MCNC: 9.7 G/DL (ref 12–16)
IMM GRANULOCYTES # BLD AUTO: 0.03 K/UL (ref 0–0.04)
IMM GRANULOCYTES NFR BLD AUTO: 0.4 % (ref 0–0.5)
LYMPHOCYTES # BLD AUTO: 5.4 K/UL (ref 1–4.8)
LYMPHOCYTES NFR BLD: 73.9 % (ref 18–48)
MAGNESIUM SERPL-MCNC: 2 MG/DL (ref 1.6–2.6)
MCH RBC QN AUTO: 25.9 PG (ref 27–31)
MCHC RBC AUTO-ENTMCNC: 29.8 G/DL (ref 32–36)
MCV RBC AUTO: 87 FL (ref 82–98)
MONOCYTES # BLD AUTO: 0.4 K/UL (ref 0.3–1)
MONOCYTES NFR BLD: 5.1 % (ref 4–15)
NEUTROPHILS # BLD AUTO: 1.4 K/UL (ref 1.8–7.7)
NEUTROPHILS NFR BLD: 19.3 % (ref 38–73)
NRBC BLD-RTO: 0 /100 WBC
PHOSPHATE SERPL-MCNC: 2 MG/DL (ref 2.7–4.5)
PLATELET # BLD AUTO: 83 K/UL (ref 150–450)
PMV BLD AUTO: 10.5 FL (ref 9.2–12.9)
POTASSIUM SERPL-SCNC: 3 MMOL/L (ref 3.5–5.1)
PROT SERPL-MCNC: 6.2 G/DL (ref 6–8.4)
RBC # BLD AUTO: 3.74 M/UL (ref 4–5.4)
SODIUM SERPL-SCNC: 137 MMOL/L (ref 136–145)
WBC # BLD AUTO: 7.28 K/UL (ref 3.9–12.7)

## 2024-03-03 PROCEDURE — 85025 COMPLETE CBC W/AUTO DIFF WBC: CPT

## 2024-03-03 PROCEDURE — 63600175 PHARM REV CODE 636 W HCPCS

## 2024-03-03 PROCEDURE — 99233 SBSQ HOSP IP/OBS HIGH 50: CPT | Mod: ,,, | Performed by: INTERNAL MEDICINE

## 2024-03-03 PROCEDURE — 20600001 HC STEP DOWN PRIVATE ROOM

## 2024-03-03 PROCEDURE — 25000003 PHARM REV CODE 250

## 2024-03-03 PROCEDURE — 84100 ASSAY OF PHOSPHORUS: CPT

## 2024-03-03 PROCEDURE — 99900031 HC PATIENT EDUCATION (STAT)

## 2024-03-03 PROCEDURE — 94761 N-INVAS EAR/PLS OXIMETRY MLT: CPT

## 2024-03-03 PROCEDURE — 25000003 PHARM REV CODE 250: Performed by: INTERNAL MEDICINE

## 2024-03-03 PROCEDURE — 99900035 HC TECH TIME PER 15 MIN (STAT)

## 2024-03-03 PROCEDURE — S4991 NICOTINE PATCH NONLEGEND: HCPCS | Performed by: STUDENT IN AN ORGANIZED HEALTH CARE EDUCATION/TRAINING PROGRAM

## 2024-03-03 PROCEDURE — 83735 ASSAY OF MAGNESIUM: CPT

## 2024-03-03 PROCEDURE — 63600175 PHARM REV CODE 636 W HCPCS: Performed by: INTERNAL MEDICINE

## 2024-03-03 PROCEDURE — 82077 ASSAY SPEC XCP UR&BREATH IA: CPT | Performed by: STUDENT IN AN ORGANIZED HEALTH CARE EDUCATION/TRAINING PROGRAM

## 2024-03-03 PROCEDURE — 80053 COMPREHEN METABOLIC PANEL: CPT

## 2024-03-03 PROCEDURE — 25000003 PHARM REV CODE 250: Performed by: STUDENT IN AN ORGANIZED HEALTH CARE EDUCATION/TRAINING PROGRAM

## 2024-03-03 RX ORDER — POTASSIUM CHLORIDE 20 MEQ/1
20 TABLET, EXTENDED RELEASE ORAL
Status: DISCONTINUED | OUTPATIENT
Start: 2024-03-03 | End: 2024-03-05 | Stop reason: ALTCHOICE

## 2024-03-03 RX ORDER — LANOLIN ALCOHOL/MO/W.PET/CERES
400 CREAM (GRAM) TOPICAL EVERY 4 HOURS PRN
Status: DISCONTINUED | OUTPATIENT
Start: 2024-03-03 | End: 2024-03-05 | Stop reason: ALTCHOICE

## 2024-03-03 RX ORDER — DIAZEPAM 5 MG/1
10 TABLET ORAL EVERY 8 HOURS
Status: DISCONTINUED | OUTPATIENT
Start: 2024-03-03 | End: 2024-03-05

## 2024-03-03 RX ORDER — LORAZEPAM 2 MG/ML
2 INJECTION INTRAMUSCULAR EVERY 4 HOURS PRN
Status: DISCONTINUED | OUTPATIENT
Start: 2024-03-03 | End: 2024-03-03

## 2024-03-03 RX ORDER — ACETAMINOPHEN 325 MG/1
650 TABLET ORAL ONCE
Status: COMPLETED | OUTPATIENT
Start: 2024-03-03 | End: 2024-03-03

## 2024-03-03 RX ORDER — LORAZEPAM 2 MG/ML
2 INJECTION INTRAMUSCULAR EVERY 4 HOURS PRN
Status: DISCONTINUED | OUTPATIENT
Start: 2024-03-03 | End: 2024-03-06 | Stop reason: HOSPADM

## 2024-03-03 RX ORDER — SODIUM,POTASSIUM PHOSPHATES 280-250MG
2 POWDER IN PACKET (EA) ORAL ONCE
Status: COMPLETED | OUTPATIENT
Start: 2024-03-03 | End: 2024-03-03

## 2024-03-03 RX ORDER — LANOLIN ALCOHOL/MO/W.PET/CERES
800 CREAM (GRAM) TOPICAL EVERY 4 HOURS PRN
Status: DISCONTINUED | OUTPATIENT
Start: 2024-03-03 | End: 2024-03-05 | Stop reason: ALTCHOICE

## 2024-03-03 RX ADMIN — TRAZODONE HYDROCHLORIDE 200 MG: 100 TABLET ORAL at 09:03

## 2024-03-03 RX ADMIN — FOLIC ACID 1 MG: 1 TABLET ORAL at 08:03

## 2024-03-03 RX ADMIN — GABAPENTIN 300 MG: 300 CAPSULE ORAL at 02:03

## 2024-03-03 RX ADMIN — GABAPENTIN 300 MG: 300 CAPSULE ORAL at 09:03

## 2024-03-03 RX ADMIN — LORAZEPAM 2 MG: 2 INJECTION INTRAMUSCULAR; INTRAVENOUS at 06:03

## 2024-03-03 RX ADMIN — APIXABAN 5 MG: 5 TABLET, FILM COATED ORAL at 09:03

## 2024-03-03 RX ADMIN — LEVOTHYROXINE SODIUM 75 MCG: 75 TABLET ORAL at 05:03

## 2024-03-03 RX ADMIN — Medication 1 PATCH: at 08:03

## 2024-03-03 RX ADMIN — DICYCLOMINE HYDROCHLORIDE 20 MG: 20 TABLET ORAL at 06:03

## 2024-03-03 RX ADMIN — THERA TABS 1 TABLET: TAB at 08:03

## 2024-03-03 RX ADMIN — DICYCLOMINE HYDROCHLORIDE 20 MG: 20 TABLET ORAL at 12:03

## 2024-03-03 RX ADMIN — DIAZEPAM 10 MG: 5 TABLET ORAL at 09:03

## 2024-03-03 RX ADMIN — LORAZEPAM 4 MG: 2 INJECTION INTRAMUSCULAR; INTRAVENOUS at 07:03

## 2024-03-03 RX ADMIN — GABAPENTIN 300 MG: 300 CAPSULE ORAL at 08:03

## 2024-03-03 RX ADMIN — ACETAMINOPHEN 650 MG: 325 TABLET ORAL at 10:03

## 2024-03-03 RX ADMIN — DIAZEPAM 10 MG: 5 TABLET ORAL at 12:03

## 2024-03-03 RX ADMIN — DIAZEPAM 10 MG: 5 TABLET ORAL at 02:03

## 2024-03-03 RX ADMIN — DIAZEPAM 10 MG: 5 TABLET ORAL at 05:03

## 2024-03-03 RX ADMIN — POTASSIUM & SODIUM PHOSPHATES POWDER PACK 280-160-250 MG 2 PACKET: 280-160-250 PACK at 08:03

## 2024-03-03 RX ADMIN — FLUOXETINE 10 MG: 10 CAPSULE ORAL at 08:03

## 2024-03-03 RX ADMIN — APIXABAN 5 MG: 5 TABLET, FILM COATED ORAL at 02:03

## 2024-03-03 RX ADMIN — PANTOPRAZOLE SODIUM 40 MG: 40 TABLET, DELAYED RELEASE ORAL at 08:03

## 2024-03-03 RX ADMIN — METOPROLOL TARTRATE 25 MG: 25 TABLET, FILM COATED ORAL at 08:03

## 2024-03-03 RX ADMIN — METOPROLOL TARTRATE 25 MG: 25 TABLET, FILM COATED ORAL at 09:03

## 2024-03-03 RX ADMIN — Medication 100 MG: at 08:03

## 2024-03-03 NOTE — ASSESSMENT & PLAN NOTE
Pt has extensive alcohol history. Pt also has history of withdrawal seizures in the past. Pt last drink was the night PTA. Pt normally drinks multiple glasses of vodka a day and goes through multiple bottles per week.     Plan   CIWA q4hrs   Lorazepam 2mg q4h IV if CIWA >8  Diazepam 10mg oral q8h scheduled,   Folate   thiamine

## 2024-03-03 NOTE — ASSESSMENT & PLAN NOTE
- CIWA score 10  - AST, ALT improving  - alcohol level 10 today from 81  - diazepam 10 mg p.o. q.6  - lorazepam 2 mg q.2h PRN  - thiamine 100 mg p.o.  - folic acid 1 mg  - monitor with vitals, CIWA score q.6h

## 2024-03-03 NOTE — SUBJECTIVE & OBJECTIVE
Interval History: complaints of headache, nausea.     Review of Systems   Constitutional:  Positive for fatigue. Negative for fever.   HENT:  Negative for congestion, ear pain and sore throat.    Eyes:  Negative for visual disturbance.   Respiratory:  Negative for cough, shortness of breath and wheezing.    Cardiovascular:  Negative for chest pain, palpitations and leg swelling.   Gastrointestinal:  Positive for nausea. Negative for abdominal pain, constipation, diarrhea and vomiting.   Genitourinary:  Negative for difficulty urinating, dysuria and hematuria.   Musculoskeletal:  Negative for arthralgias, back pain and joint swelling.   Skin:  Negative for rash.   Neurological:  Positive for weakness and headaches. Negative for dizziness, tremors, seizures, syncope, speech difficulty and light-headedness.   Psychiatric/Behavioral:  Negative for agitation and hallucinations. The patient is not nervous/anxious.      Objective:     Vital Signs (Most Recent):  Temp: 98.2 °F (36.8 °C) (03/03/24 1105)  Pulse: 76 (03/03/24 1405)  Resp: 20 (03/03/24 1405)  BP: 128/63 (03/03/24 1405)  SpO2: 96 % (03/03/24 1405) Vital Signs (24h Range):  Temp:  [98 °F (36.7 °C)-98.6 °F (37 °C)] 98.2 °F (36.8 °C)  Pulse:  [66-96] 76  Resp:  [18-53] 20  SpO2:  [90 %-100 %] 96 %  BP: (123-168)/(56-88) 128/63     Weight: 89.8 kg (197 lb 15.6 oz)  Body mass index is 31.95 kg/m².    Intake/Output Summary (Last 24 hours) at 3/3/2024 1419  Last data filed at 3/3/2024 1305  Gross per 24 hour   Intake 1305.98 ml   Output --   Net 1305.98 ml         Physical Exam  Constitutional:       General: She is not in acute distress.     Appearance: She is ill-appearing.   HENT:      Head: Normocephalic and atraumatic.      Mouth/Throat:      Mouth: Mucous membranes are moist.   Eyes:      Extraocular Movements: Extraocular movements intact.      Conjunctiva/sclera: Conjunctivae normal.      Pupils: Pupils are equal, round, and reactive to light.   Neck:       Vascular: No carotid bruit.   Cardiovascular:      Rate and Rhythm: Normal rate and regular rhythm.      Heart sounds: No murmur heard.  Pulmonary:      Effort: No respiratory distress.      Breath sounds: No wheezing or rhonchi.   Abdominal:      General: Bowel sounds are normal.      Tenderness: There is no abdominal tenderness.   Musculoskeletal:         General: No swelling or tenderness.      Cervical back: No rigidity.      Right lower leg: No edema.      Left lower leg: No edema.      Comments: Tremors of BL UE + (improved than yesterday)   Skin:     General: Skin is warm.      Capillary Refill: Capillary refill takes less than 2 seconds.      Coloration: Skin is not jaundiced.      Findings: No rash.   Neurological:      Mental Status: She is alert and oriented to person, place, and time.      Cranial Nerves: No cranial nerve deficit.      Motor: Weakness present.      Comments: Left upper extremity power 2/5  Right upper extremity, left lower extremity, right lower extremity power 5 /5   Psychiatric:         Attention and Perception: She does not perceive auditory or visual hallucinations.         Behavior: Behavior is slowed.         Thought Content: Thought content is not paranoid or delusional. Thought content does not include suicidal ideation.         Judgment: Judgment normal.             Significant Labs: All pertinent labs within the past 24 hours have been reviewed.    Significant Imaging: I have reviewed all pertinent imaging results/findings within the past 24 hours.

## 2024-03-03 NOTE — HOSPITAL COURSE
Pt to presented to the ED on 3/1/2024 with complaint of nausea, confusion, & body aches. In the ED, the pt had signs of active EtOH withdrawal. Last drink was night prior to admission. Per history she drinks a couple glasses per day of vodka, goes through a few bottles a week. Pt was nauseated, but was not vomiting. Pt was treated with benzodiazepines & responded favorably. Pt had some atrial fib with RVR which responded to metoprolol IV. Now in NSR. Continuing valium taper.

## 2024-03-03 NOTE — ASSESSMENT & PLAN NOTE
Patient's anemia is currently controlled. Has not received any PRBCs to date. 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

## 2024-03-03 NOTE — SUBJECTIVE & OBJECTIVE
Interval History/Significant Events: No acute events overnight. Home dose trazodone added. Pt pending discharge.    Review of Systems   Constitutional:  Positive for fatigue. Negative for fever.   HENT:  Negative for congestion, ear pain and sore throat.    Eyes:  Negative for visual disturbance.   Respiratory:  Negative for cough, shortness of breath and wheezing.    Cardiovascular:  Negative for chest pain, palpitations and leg swelling.   Gastrointestinal:  Positive for nausea and vomiting. Negative for abdominal pain, constipation and diarrhea.   Genitourinary:  Negative for difficulty urinating, dysuria and hematuria.   Musculoskeletal:  Negative for arthralgias, back pain and joint swelling.   Skin:  Negative for rash.   Neurological:  Positive for tremors, weakness and headaches. Negative for dizziness, seizures, syncope, speech difficulty and light-headedness.   Psychiatric/Behavioral:  Positive for agitation. Negative for hallucinations. The patient is nervous/anxious.      Objective:     Vital Signs (Most Recent):  Temp: 98 °F (36.7 °C) (03/03/24 0705)  Pulse: 96 (03/03/24 0905)  Resp: (!) 28 (03/03/24 0905)  BP: (!) 123/56 (03/03/24 0905)  SpO2: (!) 90 % (03/03/24 0905) Vital Signs (24h Range):  Temp:  [98 °F (36.7 °C)-98.6 °F (37 °C)] 98 °F (36.7 °C)  Pulse:  [66-96] 96  Resp:  [18-53] 28  SpO2:  [90 %-100 %] 90 %  BP: ()/(56-72) 123/56   Weight: 89.8 kg (197 lb 15.6 oz)  Body mass index is 31.95 kg/m².      Intake/Output Summary (Last 24 hours) at 3/3/2024 1055  Last data filed at 3/3/2024 0959  Gross per 24 hour   Intake 705.98 ml   Output --   Net 705.98 ml          Physical Exam  Constitutional:       General: She is not in acute distress.     Appearance: She is ill-appearing.   HENT:      Head: Normocephalic and atraumatic.      Mouth/Throat:      Mouth: Mucous membranes are moist.   Eyes:      Extraocular Movements: Extraocular movements intact.      Conjunctiva/sclera: Conjunctivae normal.       Pupils: Pupils are equal, round, and reactive to light.   Neck:      Vascular: No carotid bruit.   Cardiovascular:      Rate and Rhythm: Normal rate and regular rhythm.      Heart sounds: No murmur heard.  Pulmonary:      Effort: No respiratory distress.      Breath sounds: No wheezing or rhonchi.   Abdominal:      General: Bowel sounds are normal.      Tenderness: There is no abdominal tenderness.   Musculoskeletal:         General: No swelling or tenderness.      Cervical back: No rigidity.      Right lower leg: No edema.      Left lower leg: No edema.      Comments: Occasional tremors of BL UE   Skin:     General: Skin is warm.      Capillary Refill: Capillary refill takes less than 2 seconds.      Coloration: Skin is not jaundiced.      Findings: No rash.   Neurological:      Mental Status: She is alert and oriented to person, place, and time.      Cranial Nerves: No cranial nerve deficit.      Motor: Weakness present.   Psychiatric:         Mood and Affect: Mood normal.            Vents:     Lines/Drains/Airways       Peripheral Intravenous Line  Duration                  Peripheral IV - Single Lumen 03/02/24 0331 24 G Right Antecubital 1 day         Peripheral IV - Single Lumen 03/03/24 0619 20 G Anterior;Distal;Left Forearm <1 day                  Significant Labs:    CBC/Anemia Profile:  Recent Labs   Lab 03/01/24  1623 03/02/24  0327 03/03/24  0536   WBC 28.71* 16.52* 7.28   HGB 10.2* 9.8* 9.7*   HCT 34.4* 33.1* 32.6*   * 104* 83*   MCV 88 88 87   RDW 18.3* 18.3* 18.3*        Chemistries:  Recent Labs   Lab 03/01/24  2244 03/02/24  0327 03/03/24  0536    135* 137   K 4.0 4.8 3.0*   CL 95 97 96   CO2 16* 15* 27   BUN 11 10 6*   CREATININE 0.9 1.1 0.8   CALCIUM 8.7 8.8 8.9   ALBUMIN 4.0 4.0 3.7   PROT 6.9 7.1 6.2   BILITOT 0.7 0.7 0.5   ALKPHOS 62 60 61   ALT 47* 47* 45*   AST 82* 82* 60*   MG  --  1.7 2.0   PHOS  --  1.4* 2.0*       All pertinent labs within the past 24 hours have been  reviewed.    Significant Imaging:  I have reviewed all pertinent imaging results/findings within the past 24 hours.

## 2024-03-03 NOTE — ASSESSMENT & PLAN NOTE
CIWA 10  Tylenol given for headache  Encouraged to ask for Ondansetron for nausea  Encouraged to eat meals on time

## 2024-03-03 NOTE — PROGRESS NOTES
[facility] - Cardiac Medical ICU  Critical Care Medicine  Progress Note    Patient Name: Earl Abdul  MRN: [id number]  Admission Date: 3/1/2024  Hospital Length of Stay: 1 days  Code Status: Full Code  Attending Provider: Shaq Jones MD  Primary Care Provider: Andrew Rodriguez MD   Principal Problem: Alcohol withdrawal    Subjective:     HPI:  63 yof pmh substance abuse, hx of sarcoidosis, HLD, DT, withdrawal seizures presenting with confusion, nausea and concern for withdrawals. Last drink was night prior to admission, states she drinks a couple glasses per day of vodka, goes through a few bottles a week. Pt is nauseated, but has not vomiting. Difficult getting history due to confusion and lethargy.    In the ED: pt has been given multiple doses of diazepam and multiple boluses, WBC 28, hgb 10.2, bicarb 15, anion gap 27, glucose 53, AST 88, ALT 47. Lactic 2.6, alcohol 81. Pt went into afib with RVR responded to 3rd dose of metoprolol and was back in [facility]    Hospital/ICU Course:  Pt to presented to the ED on 3/1/2024 with complaint of nausea, confusion, & body aches. In the ED, the pt had signs of active EtOH withdrawal. Last drink was night prior to admission. Per history she drinks a couple glasses per day of vodka, goes through a few bottles a week. Pt was nauseated, but was not vomiting. Pt was treated with benzodiazepines & responded favorably. Pt had some atrial fib with RVR which responded to metoprolol IV. Now in NSR. Continuing valium taper.    Interval History/Significant Events: No acute events overnight. Home dose trazodone added. Pt pending discharge.    Review of Systems   Constitutional:  Positive for fatigue. Negative for fever.   HENT:  Negative for congestion, ear pain and sore throat.    Eyes:  Negative for visual disturbance.   Respiratory:  Negative for cough, shortness of breath and wheezing.    Cardiovascular:  Negative for chest pain, palpitations and leg swelling.    Gastrointestinal:  Positive for nausea and vomiting. Negative for abdominal pain, constipation and diarrhea.   Genitourinary:  Negative for difficulty urinating, dysuria and hematuria.   Musculoskeletal:  Negative for arthralgias, back pain and joint swelling.   Skin:  Negative for rash.   Neurological:  Positive for tremors, weakness and headaches. Negative for dizziness, seizures, syncope, speech difficulty and light-headedness.   Psychiatric/Behavioral:  Positive for agitation. Negative for hallucinations. The patient is nervous/anxious.      Objective:     Vital Signs (Most Recent):  Temp: 98 °F (36.7 °C) (03/03/24 0705)  Pulse: 96 (03/03/24 0905)  Resp: (!) 28 (03/03/24 0905)  BP: (!) 123/56 (03/03/24 0905)  SpO2: (!) 90 % (03/03/24 0905) Vital Signs (24h Range):  Temp:  [98 °F (36.7 °C)-98.6 °F (37 °C)] 98 °F (36.7 °C)  Pulse:  [66-96] 96  Resp:  [18-53] 28  SpO2:  [90 %-100 %] 90 %  BP: ()/(56-72) 123/56   Weight: 89.8 kg (197 lb 15.6 oz)  Body mass index is 31.95 kg/m².      Intake/Output Summary (Last 24 hours) at 3/3/2024 1055  Last data filed at 3/3/2024 0959  Gross per 24 hour   Intake 705.98 ml   Output --   Net 705.98 ml          Physical Exam  Constitutional:       General: She is not in acute distress.     Appearance: She is ill-appearing.   HENT:      Head: Normocephalic and atraumatic.      Mouth/Throat:      Mouth: Mucous membranes are moist.   Eyes:      Extraocular Movements: Extraocular movements intact.      Conjunctiva/sclera: Conjunctivae normal.      Pupils: Pupils are equal, round, and reactive to light.   Neck:      Vascular: No carotid bruit.   Cardiovascular:      Rate and Rhythm: Normal rate and regular rhythm.      Heart sounds: No murmur heard.  Pulmonary:      Effort: No respiratory distress.      Breath sounds: No wheezing or rhonchi.   Abdominal:      General: Bowel sounds are normal.      Tenderness: There is no abdominal tenderness.   Musculoskeletal:         General: No  swelling or tenderness.      Cervical back: No rigidity.      Right lower leg: No edema.      Left lower leg: No edema.      Comments: Occasional tremors of BL UE   Skin:     General: Skin is warm.      Capillary Refill: Capillary refill takes less than 2 seconds.      Coloration: Skin is not jaundiced.      Findings: No rash.   Neurological:      Mental Status: She is alert and oriented to person, place, and time.      Cranial Nerves: No cranial nerve deficit.      Motor: Weakness present.   Psychiatric:         Mood and Affect: Mood normal.            Vents:     Lines/Drains/Airways       Peripheral Intravenous Line  Duration                  Peripheral IV - Single Lumen 03/02/24 0331 24 G Right Antecubital 1 day         Peripheral IV - Single Lumen 03/03/24 0619 20 G Anterior;Distal;Left Forearm <1 day                  Significant Labs:    CBC/Anemia Profile:  Recent Labs   Lab 03/01/24  1623 03/02/24  0327 03/03/24  0536   WBC 28.71* 16.52* 7.28   HGB 10.2* 9.8* 9.7*   HCT 34.4* 33.1* 32.6*   * 104* 83*   MCV 88 88 87   RDW 18.3* 18.3* 18.3*        Chemistries:  Recent Labs   Lab 03/01/24  2244 03/02/24  0327 03/03/24  0536    135* 137   K 4.0 4.8 3.0*   CL 95 97 96   CO2 16* 15* 27   BUN 11 10 6*   CREATININE 0.9 1.1 0.8   CALCIUM 8.7 8.8 8.9   ALBUMIN 4.0 4.0 3.7   PROT 6.9 7.1 6.2   BILITOT 0.7 0.7 0.5   ALKPHOS 62 60 61   ALT 47* 47* 45*   AST 82* 82* 60*   MG  --  1.7 2.0   PHOS  --  1.4* 2.0*       All pertinent labs within the past 24 hours have been reviewed.    Significant Imaging:  I have reviewed all pertinent imaging results/findings within the past 24 hours.    ABG  Recent Labs   Lab 03/01/24  2132   PH 7.392   PO2 109*   PCO2 26.2*   HCO3 15.9*   BE -9*     Assessment/Plan:     Psychiatric  * Alcohol withdrawal  Pt has history of withdrawal symptoms. See alcohol use disorder    Alcohol use disorder, severe, dependence  Pt has extensive alcohol history. Pt also has history of withdrawal  seizures in the past. Pt last drink was the night PTA. Pt normally drinks multiple glasses of vodka a day and goes through multiple bottles per week.     Plan   CIWA q4hrs   Lorazepam 2mg q4h IV if CIWA >8  Diazepam 10mg oral q8h scheduled,   Folate   thiamine    Cardiac/Vascular  Atrial fibrillation  Pt presented to the hospital for alcohol withdrawal. While in the ER on telemetry patient's heart rate shot up into the 160s with irregular rhythm. Pt was feeling nauseated but no other symptoms noted. Pt does not know of a history of abnormal rhythm. Chads-vasc 4, Has-bled 2. No prior episodes noted in chart.     Plan  Metoprolol tartrate IV 5mg x3 doses. A fib broke after 3rd dose of metoprolol, back in NSR  If patient becomes hemodynamically unstable may need cardioversion  Cardiac monitoring  If concern stat EKG  Metoprolol tartrate 25mg BID          Endocrine  Type 2 diabetes mellitus with hypoglycemia  Pt has been dealing with hypoglycemia during hospital stay. Pt has required multiple bolus and sugary drinks. Most likely due to her alcohol use.    Plan   Hold insulin while dealing with hypoglycemia  Hold metformin  D10 bolus if sugars below <60    GI  Nausea  Zofran prn for nausea, Qtc 436.        Critical Care Daily Checklist:    A: Awake: RASS Goal/Actual Goal: RASS Goal: 0-->alert and calm  Actual:     B: Spontaneous Breathing Trial Performed?     C: SAT & SBT Coordinated?  N/a                      D: Delirium: CAM-ICU Overall CAM-ICU: Negative   E: Early Mobility Performed? No   F: Feeding Goal:    Status:     Current Diet Order   Procedures    Diet Adult Regular (IDDSI Level 7)      AS: Analgesia/Sedation N/A   T: Thromboembolic Prophylaxis Lovenox   H: HOB > 300 Yes   U: Stress Ulcer Prophylaxis (if needed) No   G: Glucose Control Holding   B: Bowel Function Stool Occurrence: 1   I: Indwelling Catheter (Lines & Boudreaux) Necessity PIV   D: De-escalation of Antimicrobials/Pharmacotherapies N/A    Plan for the  day/ETD Stepdown    Code Status:  Family/Goals of Care: Full Code          Critical care was time spent personally by me on the following activities: development of treatment plan with patient or surrogate and bedside caregivers, discussions with consultants, evaluation of patient's response to treatment, examination of patient, ordering and performing treatments and interventions, ordering and review of laboratory studies, ordering and review of radiographic studies, pulse oximetry, re-evaluation of patient's condition. This critical care time did not overlap with that of any other provider or involve time for any procedures.     Levy Bach MD  Critical Care Medicine  [facility] - Cardiac Medical ICU

## 2024-03-03 NOTE — ASSESSMENT & PLAN NOTE
Patient has Abnormal Phosphorus: hypophosphatemia. Will continue to monitor electrolytes closely. Will replace the affected electrolytes and repeat labs to be done after interventions completed. The patient's phosphorus results have been reviewed and are listed below.  Recent Labs   Lab 03/03/24  0536   PHOS 2.0*

## 2024-03-03 NOTE — ASSESSMENT & PLAN NOTE
Chronic, controlled. Latest blood pressure and vitals reviewed-     Temp:  [98 °F (36.7 °C)-98.6 °F (37 °C)]   Pulse:  [66-96]   Resp:  [18-53]   BP: (123-168)/(56-88)   SpO2:  [90 %-100 %] .   Home meds for hypertension were reviewed and noted below.   Hypertension Medications               losartan-hydrochlorothiazide 100-25 mg (HYZAAR) 100-25 mg per tablet Take 1 tablet by mouth once daily.            While in the hospital, will manage blood pressure as follows;   Due to fluctuation in blood pressure and starting of metoprolol for atrial fibrillation we will monitor  We will restart her home medicine losartan-HCTZ according to the blood pressure response    Will utilize p.r.n. blood pressure medication only if patient's blood pressure greater than 180/110 and she develops symptoms such as worsening chest pain or shortness of breath.

## 2024-03-03 NOTE — ASSESSMENT & PLAN NOTE
- encourage to follow with endocrinology as outpatient for more evaluation due to paroxysmal atrial fibrillation new onset  - TSH 0.203, FT4 1.12

## 2024-03-03 NOTE — PROGRESS NOTES
[facility]  Hospital Medicine  Progress Note    Patient Name: Earl Abdul  MRN: [id number]  Patient Class: IP- Inpatient   Admission Date: 3/1/2024  Length of Stay: 1 days  Attending Physician: Shaq Jones MD  Primary Care Provider: Andrew Rodriguez MD        Subjective:     Principal Problem:Alcohol withdrawal        HPI:  Earl Abdul is a 63 y.o. female w/ a PMHx of EtOH use disorder, CLL not currently on any treatment, COPD, HTN, depression on multiple antidepressants, right breast cancer s/p bilateral mastectomy, hypothyroidism. Patient presents to the ED today for generalized fatigue and nausea.  Patient states that these are both chronic issues, but her nauseous becoming unbearable.  Patient was seen on 02/20 for similar complaints and was discharged w/ prescription for Zofran, which he states has not been helping her nausea.  She was admitted for similar complaints of alcohol withdrawal and atraumatic rhabdomyolysis from 2/10-2/14/24.  In the ED, the pt had signs of active EtOH withdrawal. Last drink was night prior to admission. Per history she drinks a couple glasses per day of vodka, goes through a few bottles a week. Pt was nauseated, but was not vomiting. Pt was treated with benzodiazepines & responded favorably. Pt had atrial fibrillation with RVR which responded to metoprolol IV x3.  She was admitted to the ICU and step-down to hospital medicine today.     She complains of left upper extremity weakness started around 1 week ago.  She denies shortness of breath, chest pain, palpitations, lightheadedness, dark stools, bleeding in urine.    Overview/Hospital Course:  Treated with benzodiazepine in ER for alcohol withdrawal, and atrial fibrillation with RVR with metoprolol in ICU.  Patient has new onset of atrial fibrillation.  One-week onset of left upper extremity weakness. CT Head 3/3/24 negative for any acute ischemia/stroke. Started Eliquis 5 mg BID for  Atrial fibrillation. Alcohol withdrawal symptoms improving. Alcohol level less than 10 on 3/3/24.    Interval History: complaints of headache, nausea.     Review of Systems   Constitutional:  Positive for fatigue. Negative for fever.   HENT:  Negative for congestion, ear pain and sore throat.    Eyes:  Negative for visual disturbance.   Respiratory:  Negative for cough, shortness of breath and wheezing.    Cardiovascular:  Negative for chest pain, palpitations and leg swelling.   Gastrointestinal:  Positive for nausea. Negative for abdominal pain, constipation, diarrhea and vomiting.   Genitourinary:  Negative for difficulty urinating, dysuria and hematuria.   Musculoskeletal:  Negative for arthralgias, back pain and joint swelling.   Skin:  Negative for rash.   Neurological:  Positive for weakness and headaches. Negative for dizziness, tremors, seizures, syncope, speech difficulty and light-headedness.   Psychiatric/Behavioral:  Negative for agitation and hallucinations. The patient is not nervous/anxious.      Objective:     Vital Signs (Most Recent):  Temp: 98.2 °F (36.8 °C) (03/03/24 1105)  Pulse: 76 (03/03/24 1405)  Resp: 20 (03/03/24 1405)  BP: 128/63 (03/03/24 1405)  SpO2: 96 % (03/03/24 1405) Vital Signs (24h Range):  Temp:  [98 °F (36.7 °C)-98.6 °F (37 °C)] 98.2 °F (36.8 °C)  Pulse:  [66-96] 76  Resp:  [18-53] 20  SpO2:  [90 %-100 %] 96 %  BP: (123-168)/(56-88) 128/63     Weight: 89.8 kg (197 lb 15.6 oz)  Body mass index is 31.95 kg/m².    Intake/Output Summary (Last 24 hours) at 3/3/2024 1419  Last data filed at 3/3/2024 1305  Gross per 24 hour   Intake 1305.98 ml   Output --   Net 1305.98 ml         Physical Exam  Constitutional:       General: She is not in acute distress.     Appearance: She is ill-appearing.   HENT:      Head: Normocephalic and atraumatic.      Mouth/Throat:      Mouth: Mucous membranes are moist.   Eyes:      Extraocular Movements: Extraocular movements intact.       Conjunctiva/sclera: Conjunctivae normal.      Pupils: Pupils are equal, round, and reactive to light.   Neck:      Vascular: No carotid bruit.   Cardiovascular:      Rate and Rhythm: Normal rate and regular rhythm.      Heart sounds: No murmur heard.  Pulmonary:      Effort: No respiratory distress.      Breath sounds: No wheezing or rhonchi.   Abdominal:      General: Bowel sounds are normal.      Tenderness: There is no abdominal tenderness.   Musculoskeletal:         General: No swelling or tenderness.      Cervical back: No rigidity.      Right lower leg: No edema.      Left lower leg: No edema.      Comments: Tremors of BL UE + (improved than yesterday)   Skin:     General: Skin is warm.      Capillary Refill: Capillary refill takes less than 2 seconds.      Coloration: Skin is not jaundiced.      Findings: No rash.   Neurological:      Mental Status: She is alert and oriented to person, place, and time.      Cranial Nerves: No cranial nerve deficit.      Motor: Weakness present.      Comments: Left upper extremity power 2/5  Right upper extremity, left lower extremity, right lower extremity power 5 /5   Psychiatric:         Attention and Perception: She does not perceive auditory or visual hallucinations.         Behavior: Behavior is slowed.         Thought Content: Thought content is not paranoid or delusional. Thought content does not include suicidal ideation.         Judgment: Judgment normal.             Significant Labs: All pertinent labs within the past 24 hours have been reviewed.    Significant Imaging: I have reviewed all pertinent imaging results/findings within the past 24 hours.    Assessment/Plan:      * Alcohol withdrawal  - CIWA score 10  - AST, ALT improving  - alcohol level 10 today from 81  - diazepam 10 mg p.o. q.6  - lorazepam 2 mg q.2h PRN  - thiamine 100 mg p.o.  - folic acid 1 mg  - monitor with vitals, CIWA score q.6h        Atrial fibrillation  - Patient with Paroxysmal (<7 days)  "atrial fibrillation which is controlled currently with Beta Blocker. Patient is currently in sinus rhythm.[id number] Score: 4. HASBLED Score: 2   - Anticoagulation with Eliquis 5 mg BID  - CT scan Head negative for any acute intracranial process  - EKG 3/1/24 showed atrial fibrillation with RVR  - TTE echocardiogram ordered awaiting to be performed  - we will adjust the dose of metoprolol according to the blood pressure and atrial fibrillation  - consider consulting cardiology if any abnormality in echocardiogram otherwise outpatient workup with cardiology, sleep physician for VINICIO, endocrinology for subclinical hyperthyroidism    Weakness of left upper extremity  - recent onset of new left upper extremity weakness   - CT head reviewed  - Less likely due to any acute intracranial process             Type 2 diabetes mellitus with hypoglycemia  Patient's FSGs are uncontrolled due to hypoglycemia on current medication regimen.  Last A1c reviewed-   Lab Results   Component Value Date    HGBA1C 6.0 (H) 02/11/2024     Most recent fingerstick glucose reviewed-   No results for input(s): "POCTGLUCOSE" in the last 24 hours.    Current correctional scale  None   anti-hyperglycemic dose as follows-   Due to hypoglycemic episodes hold insulin at the moment   We will monitor and restart with low-dose insulin sliding scale  Currently hypoglycemia protocol in place  Antihyperglycemics (From admission, onward)      None          Hold Oral hypoglycemics while patient is in the hospital.    Subclinical hyperthyroidism  - encourage to follow with endocrinology as outpatient for more evaluation due to paroxysmal atrial fibrillation new onset  - TSH 0.203, FT4 1.12      Anemia, chronic disease  Patient's anemia is currently controlled. Has not received any PRBCs to date. 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    Essential hypertension  Chronic, controlled. Latest blood pressure and vitals reviewed-     Temp:  [98 °F (36.7 °C)-98.6 °F (37 °C)]   Pulse:  [66-96]   Resp:  [18-53]   BP: (123-168)/(56-88)   SpO2:  [90 %-100 %] .   Home meds for hypertension were reviewed and noted below.   Hypertension Medications               losartan-hydrochlorothiazide 100-25 mg (HYZAAR) 100-25 mg per tablet Take 1 tablet by mouth once daily.            While in the hospital, will manage blood pressure as follows;   Due to fluctuation in blood pressure and starting of metoprolol for atrial fibrillation we will monitor  We will restart her home medicine losartan-HCTZ according to the blood pressure response    Will utilize p.r.n. blood pressure medication only if patient's blood pressure greater than 180/110 and she develops symptoms such as worsening chest pain or shortness of breath.    Tobacco abuse  Assistance with smoking cessation was offered, including:  [x]  Medications  [x]  Counseling  []  Printed Information on Smoking Cessation  []  Referral to a Smoking Cessation Program    Patient was counseled regarding smoking for >10 minutes.   Nicotine patch 21 mg daily    Hypokalemia  Patient has hypokalemia which is Acute and currently uncontrolled. Most recent potassium levels reviewed-   Lab Results   Component Value Date    K 3.0 (L) 03/03/2024   . Will continue potassium replacement per protocol and recheck repeat levels after replacement completed.     Hypophosphatemia  Patient has Abnormal Phosphorus: hypophosphatemia. Will continue to monitor electrolytes closely. Will replace the affected electrolytes and repeat labs to be done after interventions completed. The patient's phosphorus results have been reviewed and are listed below.  Recent Labs   Lab 03/03/24  0536   PHOS 2.0*          Nausea  - pantoprazole 40  p.o. daily   - Ondansetron 4 mg q8PRN      Alcohol use disorder, severe, dependence  CIWA 10  Tylenol given for headache  Encouraged to ask for Ondansetron for nausea  Encouraged to eat meals on time        VTE Risk Mitigation (From admission, onward)           Ordered     apixaban tablet 5 mg  2 times daily         03/03/24 1317     IP VTE HIGH RISK PATIENT  Once         03/02/24 0005     Place sequential compression device  Until discontinued         03/02/24 0005                    Discharge Planning   BECCA:      Code Status: Full Code   Is the patient medically ready for discharge?:     Reason for patient still in hospital (select all that apply): Patient trending condition, Laboratory test, and Treatment                     Mustapha Hyde MD  Department of Hospital Medicine   [facility] - Cardiac Medical ICU

## 2024-03-03 NOTE — ASSESSMENT & PLAN NOTE
Patient has hypokalemia which is Acute and currently uncontrolled. Most recent potassium levels reviewed-   Lab Results   Component Value Date    K 3.0 (L) 03/03/2024   . Will continue potassium replacement per protocol and recheck repeat levels after replacement completed.

## 2024-03-03 NOTE — ASSESSMENT & PLAN NOTE
- recent onset of new left upper extremity weakness   - CT head reviewed  - Less likely due to any acute intracranial process

## 2024-03-03 NOTE — PLAN OF CARE
MICU DAILY GOALS     Family/Goals of care/Code Status   Code Status: Full Code    24H Vital Sign Range  Temp:  [98.5 °F (36.9 °C)-99 °F (37.2 °C)]   Pulse:  [66-91]   Resp:  [18-53]   BP: ()/(60-76)   SpO2:  [92 %-100 %]      Shift Events (include procedures and significant events)   No acute events throughout shift. Trencing pt CIWAs. PRN benzos given twice throughout shift. Administered medications as prescribed. Pt with stepdown orders.     AWAKE RASS: Goal -    Actual -      Restraint necessity: Not necessary   BREATHE SBT: Not intubated    Coordinate A & B, analgesics/sedatives Pain: managed   SAT: Not intubated   Delirium CAM-ICU: Overall CAM-ICU: Negative   Early(intubated/ Progressive (non-intubated) Mobility MOVE Screen (INTUBATED ONLY): Not intubated    Activity: Activity Management: Rolling - L1   Feeding/Nutrition Diet order: Diet/Nutrition Received: regular,     Thrombus DVT prophylaxis: VTE Required Core Measure: Pharmacological prophylaxis initiated/maintained   HOB Elevation Head of Bed (HOB) Positioning: HOB at 30-45 degrees   Ulcer Prophylaxis GI: yes   Glucose control managed Glycemic Management: blood glucose monitored   Skin Skin assessed during: Daily Assessment    Sacrum intact/not altered? Yes  Heels intact/not altered? Yes  Surgical wound? No    CHECK ONE!   (no altered skin or altered skin) and sub boxes:  [x] No Altered Skin Integrity Present    []Prevention Measures Documented    [] Altered Skin Integrity Present or Discovered   [] LDA present in EPIC, daily doc completed              [] LDA added if not in EPIC (describe wound).                    When describing wound, do not stage, use descriptive words only.    [] Wound Image Taken (required on admit,                   transfer/discharge and every Tuesday)    Wound Care Consulted? No    Attending Nurse:     Second RN/Staff Member:    Bowel Function no issues    Indwelling Catheter Necessity         Pt gets up independently to  the bathroom.      De-escalation Antibiotics Yes        VS and assessment per flow sheet, patient progressing towards goals as tolerated, plan of care reviewed with  Earl Abdul , all concerns addressed on my shift.

## 2024-03-03 NOTE — PLAN OF CARE
[facility]  Initial Discharge Assessment       Primary Care Provider: Andrew Rodriguez MD    Admission Diagnosis: Alcohol withdrawal delirium [F10.931]  AF (atrial fibrillation) [I48.91]  Tachycardia [R00.0]  Abnormal EKG [R94.31]  Tachyarrhythmia [R00.0]    Admission Date: 3/1/2024  Expected Discharge Date: 3/6/2024    Transition of Care Barriers: None    Payor: [employer] / Plan: [facility] CHOICE PLUS / Product Type: Commercial /     Extended Emergency Contact Information  Primary Emergency Contact: Henrique Abdul  Address: [address] [place] [address] [facility]  Home Phone: [phone]  Relation: Spouse   needed? No  Secondary Emergency Contact: Carlos Suazo  Mobile Phone: [phone]  Relation: Son    Discharge Plan A: Home Health  Discharge Plan B: Home with family      [id number] - [place] - [address] AT [facility]  [address]  [place]   [place] [address]  Phone: [phone] Fax: [phone]    SW met with patient at bedside to complete discharge planning assessment.  Patient alert and oriented xs 4.  Patient verified all demographic information on facesheet is correct.  Patient verified PCP is Dr. Rodriguez.  Patient verified primary health insurance is [facility].  Patient active with [facility] and has listed DME.  Patient with NO POA or Living Will.  Patient not on dialysis or medication coumadin.  Patient with  30 day admission.  Patient with no financial issues at this time.  Patient family will provide transportation upon discharge from facility.  Patient independent with ADLs, live with spouse, drives self.      Initial Assessment (most recent)       Adult Discharge Assessment - 03/03/24 1606          Discharge Assessment    Assessment Type Discharge Planning Assessment     Confirmed/corrected address, phone number and insurance Yes     Confirmed Demographics Correct on  Facesheet     Source of Information patient     Communicated BECCA with patient/caregiver Date not available/Unable to determine     People in Home spouse     Facility Arrived From: home     Do you expect to return to your current living situation? Yes     Do you have help at home or someone to help you manage your care at home? Yes     Who are your caregiver(s) and their phone number(s)? spouse     Prior to hospitilization cognitive status: Alert/Oriented     Current cognitive status: Alert/Oriented     Walking or Climbing Stairs Difficulty yes     Walking or Climbing Stairs ambulation difficulty, requires equipment;stair climbing difficulty, requires equipment     Dressing/Bathing Difficulty yes     Dressing/Bathing bathing difficulty, requires equipment     Equipment Currently Used at Home cane, straight     Readmission within 30 days? Yes     Patient currently being followed by outpatient case management? No     Do you currently have service(s) that help you manage your care at home? Yes     Name and Contact number of agency [facility]     Is the pt/caregiver preference to resume services with current agency Yes     Do you take prescription medications? Yes     Do you have prescription coverage? Yes     Do you have any problems affording any of your prescribed medications? No     Is the patient taking medications as prescribed? yes     Who is going to help you get home at discharge? spouse     How do you get to doctors appointments? family or friend will provide;car, drives self     Are you on dialysis? No     Do you take coumadin? No     Discharge Plan A Home Health     Discharge Plan B Home with family     DME Needed Upon Discharge  none     Discharge Plan discussed with: Patient     Transition of Care Barriers None

## 2024-03-03 NOTE — ASSESSMENT & PLAN NOTE
Assistance with smoking cessation was offered, including:  [x]  Medications  [x]  Counseling  []  Printed Information on Smoking Cessation  []  Referral to a Smoking Cessation Program    Patient was counseled regarding smoking for >10 minutes.   Nicotine patch 21 mg daily

## 2024-03-03 NOTE — ASSESSMENT & PLAN NOTE
- Patient with Paroxysmal (<7 days) atrial fibrillation which is controlled currently with Beta Blocker. Patient is currently in sinus rhythm.[id number] Score: 4. HASBLED Score: 2   - Anticoagulation with Eliquis 5 mg BID  - CT scan Head negative for any acute intracranial process  - EKG 3/1/24 showed atrial fibrillation with RVR  - TTE echocardiogram ordered awaiting to be performed  - we will adjust the dose of metoprolol according to the blood pressure and atrial fibrillation  - consider consulting cardiology if any abnormality in echocardiogram otherwise outpatient workup with cardiology, sleep physician for VINICIO, endocrinology for subclinical hyperthyroidism

## 2024-03-03 NOTE — ASSESSMENT & PLAN NOTE
"Patient's FSGs are uncontrolled due to hypoglycemia on current medication regimen.  Last A1c reviewed-   Lab Results   Component Value Date    HGBA1C 6.0 (H) 02/11/2024     Most recent fingerstick glucose reviewed-   No results for input(s): "POCTGLUCOSE" in the last 24 hours.    Current correctional scale  None   anti-hyperglycemic dose as follows-   Due to hypoglycemic episodes hold insulin at the moment   We will monitor and restart with low-dose insulin sliding scale  Currently hypoglycemia protocol in place  Antihyperglycemics (From admission, onward)    None        Hold Oral hypoglycemics while patient is in the hospital.  "

## 2024-03-04 LAB
ALBUMIN SERPL BCP-MCNC: 3.4 G/DL (ref 3.5–5.2)
ALP SERPL-CCNC: 59 U/L (ref 55–135)
ALT SERPL W/O P-5'-P-CCNC: 42 U/L (ref 10–44)
ANION GAP SERPL CALC-SCNC: 12 MMOL/L (ref 8–16)
ANISOCYTOSIS BLD QL SMEAR: SLIGHT
ASCENDING AORTA: 2.95 CM
AST SERPL-CCNC: 44 U/L (ref 10–40)
AV INDEX (PROSTH): 0.66
AV MEAN GRADIENT: 5 MMHG
AV PEAK GRADIENT: 7 MMHG
AV VALVE AREA BY VELOCITY RATIO: 2.19 CM²
AV VALVE AREA: 2.16 CM²
AV VELOCITY RATIO: 0.67
BASOPHILS # BLD AUTO: 0.02 K/UL (ref 0–0.2)
BASOPHILS NFR BLD: 0.4 % (ref 0–1.9)
BILIRUB SERPL-MCNC: 0.4 MG/DL (ref 0.1–1)
BSA FOR ECHO PROCEDURE: 2.04 M2
BUN SERPL-MCNC: 6 MG/DL (ref 8–23)
CALCIUM SERPL-MCNC: 8.7 MG/DL (ref 8.7–10.5)
CHLORIDE SERPL-SCNC: 103 MMOL/L (ref 95–110)
CO2 SERPL-SCNC: 26 MMOL/L (ref 23–29)
CREAT SERPL-MCNC: 0.8 MG/DL (ref 0.5–1.4)
CV ECHO LV RWT: 0.52 CM
DIFFERENTIAL METHOD BLD: ABNORMAL
DOP CALC AO PEAK VEL: 1.3 M/S
DOP CALC AO VTI: 23.26 CM
DOP CALC LVOT AREA: 3.3 CM2
DOP CALC LVOT DIAMETER: 2.04 CM
DOP CALC LVOT PEAK VEL: 0.87 M/S
DOP CALC LVOT STROKE VOLUME: 50.21 CM3
DOP CALCLVOT PEAK VEL VTI: 15.37 CM
E WAVE DECELERATION TIME: 271.5 MSEC
E/A RATIO: 0.77
E/E' RATIO: 8.46 M/S
ECHO LV POSTERIOR WALL: 1.18 CM (ref 0.6–1.1)
EOSINOPHIL # BLD AUTO: 0.1 K/UL (ref 0–0.5)
EOSINOPHIL NFR BLD: 1.2 % (ref 0–8)
ERYTHROCYTE [DISTWIDTH] IN BLOOD BY AUTOMATED COUNT: 18.6 % (ref 11.5–14.5)
EST. GFR  (NO RACE VARIABLE): >60 ML/MIN/1.73 M^2
FRACTIONAL SHORTENING: 38 % (ref 28–44)
GLUCOSE SERPL-MCNC: 142 MG/DL (ref 70–110)
HCT VFR BLD AUTO: 33.7 % (ref 37–48.5)
HGB BLD-MCNC: 9.9 G/DL (ref 12–16)
IMM GRANULOCYTES # BLD AUTO: 0.02 K/UL (ref 0–0.04)
IMM GRANULOCYTES NFR BLD AUTO: 0.4 % (ref 0–0.5)
INTERVENTRICULAR SEPTUM: 0.97 CM (ref 0.6–1.1)
LA MAJOR: 5.5 CM
LA MINOR: 5.69 CM
LA WIDTH: 4.02 CM
LEFT ATRIUM SIZE: 4 CM
LEFT ATRIUM VOLUME INDEX: 38.4 ML/M2
LEFT ATRIUM VOLUME: 76.45 CM3
LEFT INTERNAL DIMENSION IN SYSTOLE: 2.8 CM (ref 2.1–4)
LEFT VENTRICLE DIASTOLIC VOLUME INDEX: 47.42 ML/M2
LEFT VENTRICLE DIASTOLIC VOLUME: 94.37 ML
LEFT VENTRICLE MASS INDEX: 86 G/M2
LEFT VENTRICLE SYSTOLIC VOLUME INDEX: 14.9 ML/M2
LEFT VENTRICLE SYSTOLIC VOLUME: 29.67 ML
LEFT VENTRICULAR INTERNAL DIMENSION IN DIASTOLE: 4.54 CM (ref 3.5–6)
LEFT VENTRICULAR MASS: 171.86 G
LV LATERAL E/E' RATIO: 7.86 M/S
LV SEPTAL E/E' RATIO: 9.17 M/S
LYMPHOCYTES # BLD AUTO: 4 K/UL (ref 1–4.8)
LYMPHOCYTES NFR BLD: 77.3 % (ref 18–48)
MAGNESIUM SERPL-MCNC: 1.7 MG/DL (ref 1.6–2.6)
MCH RBC QN AUTO: 26.2 PG (ref 27–31)
MCHC RBC AUTO-ENTMCNC: 29.4 G/DL (ref 32–36)
MCV RBC AUTO: 89 FL (ref 82–98)
MONOCYTES # BLD AUTO: 0.3 K/UL (ref 0.3–1)
MONOCYTES NFR BLD: 4.8 % (ref 4–15)
MV PEAK A VEL: 0.71 M/S
MV PEAK E VEL: 0.55 M/S
MV STENOSIS PRESSURE HALF TIME: 78.73 MS
MV VALVE AREA P 1/2 METHOD: 2.79 CM2
NEUTROPHILS # BLD AUTO: 0.8 K/UL (ref 1.8–7.7)
NEUTROPHILS NFR BLD: 15.9 % (ref 38–73)
NRBC BLD-RTO: 0 /100 WBC
PHOSPHATE SERPL-MCNC: 2.6 MG/DL (ref 2.7–4.5)
PLATELET # BLD AUTO: 72 K/UL (ref 150–450)
PLATELET BLD QL SMEAR: ABNORMAL
PMV BLD AUTO: 10.3 FL (ref 9.2–12.9)
POIKILOCYTOSIS BLD QL SMEAR: SLIGHT
POTASSIUM SERPL-SCNC: 3.2 MMOL/L (ref 3.5–5.1)
PROT SERPL-MCNC: 5.8 G/DL (ref 6–8.4)
RA MAJOR: 5.49 CM
RA PRESSURE ESTIMATED: 3 MMHG
RA WIDTH: 3.82 CM
RBC # BLD AUTO: 3.78 M/UL (ref 4–5.4)
RIGHT VENTRICULAR END-DIASTOLIC DIMENSION: 3.73 CM
SINUS: 2.53 CM
SMUDGE CELLS BLD QL SMEAR: PRESENT
SODIUM SERPL-SCNC: 141 MMOL/L (ref 136–145)
SPHEROCYTES BLD QL SMEAR: ABNORMAL
STJ: 2.88 CM
TDI LATERAL: 0.07 M/S
TDI SEPTAL: 0.06 M/S
TDI: 0.07 M/S
TRICUSPID ANNULAR PLANE SYSTOLIC EXCURSION: 2.29 CM
WBC # BLD AUTO: 5.16 K/UL (ref 3.9–12.7)
Z-SCORE OF LEFT VENTRICULAR DIMENSION IN END DIASTOLE: -2.4
Z-SCORE OF LEFT VENTRICULAR DIMENSION IN END SYSTOLE: -1.86

## 2024-03-04 PROCEDURE — 25000003 PHARM REV CODE 250

## 2024-03-04 PROCEDURE — 25000003 PHARM REV CODE 250: Performed by: STUDENT IN AN ORGANIZED HEALTH CARE EDUCATION/TRAINING PROGRAM

## 2024-03-04 PROCEDURE — 20600001 HC STEP DOWN PRIVATE ROOM

## 2024-03-04 PROCEDURE — 63600175 PHARM REV CODE 636 W HCPCS

## 2024-03-04 PROCEDURE — 84100 ASSAY OF PHOSPHORUS: CPT

## 2024-03-04 PROCEDURE — 25000003 PHARM REV CODE 250: Performed by: INTERNAL MEDICINE

## 2024-03-04 PROCEDURE — 99222 1ST HOSP IP/OBS MODERATE 55: CPT | Mod: ,,, | Performed by: PSYCHIATRY & NEUROLOGY

## 2024-03-04 PROCEDURE — 85025 COMPLETE CBC W/AUTO DIFF WBC: CPT

## 2024-03-04 PROCEDURE — 25500020 PHARM REV CODE 255: Performed by: INTERNAL MEDICINE

## 2024-03-04 PROCEDURE — 83735 ASSAY OF MAGNESIUM: CPT

## 2024-03-04 PROCEDURE — S4991 NICOTINE PATCH NONLEGEND: HCPCS | Performed by: STUDENT IN AN ORGANIZED HEALTH CARE EDUCATION/TRAINING PROGRAM

## 2024-03-04 PROCEDURE — 63600175 PHARM REV CODE 636 W HCPCS: Performed by: INTERNAL MEDICINE

## 2024-03-04 PROCEDURE — 80053 COMPREHEN METABOLIC PANEL: CPT

## 2024-03-04 PROCEDURE — 99233 SBSQ HOSP IP/OBS HIGH 50: CPT | Mod: ,,, | Performed by: INTERNAL MEDICINE

## 2024-03-04 PROCEDURE — 25000003 PHARM REV CODE 250: Performed by: HOSPITALIST

## 2024-03-04 RX ORDER — POLYETHYLENE GLYCOL 3350 17 G/17G
17 POWDER, FOR SOLUTION ORAL DAILY
Status: DISCONTINUED | OUTPATIENT
Start: 2024-03-04 | End: 2024-03-06 | Stop reason: HOSPADM

## 2024-03-04 RX ORDER — FLUOXETINE HYDROCHLORIDE 20 MG/1
20 CAPSULE ORAL DAILY
Status: DISCONTINUED | OUTPATIENT
Start: 2024-03-05 | End: 2024-03-05

## 2024-03-04 RX ORDER — BUTALBITAL, ACETAMINOPHEN AND CAFFEINE 50; 325; 40 MG/1; MG/1; MG/1
1 TABLET ORAL EVERY 4 HOURS PRN
Status: COMPLETED | OUTPATIENT
Start: 2024-03-04 | End: 2024-03-06

## 2024-03-04 RX ORDER — MAGNESIUM SULFATE HEPTAHYDRATE 40 MG/ML
2 INJECTION, SOLUTION INTRAVENOUS ONCE
Status: COMPLETED | OUTPATIENT
Start: 2024-03-04 | End: 2024-03-04

## 2024-03-04 RX ORDER — AMOXICILLIN 250 MG
1 CAPSULE ORAL DAILY
Status: DISCONTINUED | OUTPATIENT
Start: 2024-03-04 | End: 2024-03-06 | Stop reason: HOSPADM

## 2024-03-04 RX ADMIN — DIAZEPAM 10 MG: 5 TABLET ORAL at 05:03

## 2024-03-04 RX ADMIN — DIBASIC SODIUM PHOSPHATE, MONOBASIC POTASSIUM PHOSPHATE AND MONOBASIC SODIUM PHOSPHATE 1 TABLET: 852; 155; 130 TABLET ORAL at 09:03

## 2024-03-04 RX ADMIN — DIBASIC SODIUM PHOSPHATE, MONOBASIC POTASSIUM PHOSPHATE AND MONOBASIC SODIUM PHOSPHATE 1 TABLET: 852; 155; 130 TABLET ORAL at 01:03

## 2024-03-04 RX ADMIN — DICYCLOMINE HYDROCHLORIDE 20 MG: 20 TABLET ORAL at 12:03

## 2024-03-04 RX ADMIN — POTASSIUM BICARBONATE 50 MEQ: 978 TABLET, EFFERVESCENT ORAL at 05:03

## 2024-03-04 RX ADMIN — TRAZODONE HYDROCHLORIDE 200 MG: 100 TABLET ORAL at 09:03

## 2024-03-04 RX ADMIN — LORAZEPAM 2 MG: 2 INJECTION INTRAMUSCULAR; INTRAVENOUS at 09:03

## 2024-03-04 RX ADMIN — APIXABAN 5 MG: 5 TABLET, FILM COATED ORAL at 09:03

## 2024-03-04 RX ADMIN — DICYCLOMINE HYDROCHLORIDE 20 MG: 20 TABLET ORAL at 01:03

## 2024-03-04 RX ADMIN — PANTOPRAZOLE SODIUM 40 MG: 40 TABLET, DELAYED RELEASE ORAL at 09:03

## 2024-03-04 RX ADMIN — METOPROLOL TARTRATE 25 MG: 25 TABLET, FILM COATED ORAL at 09:03

## 2024-03-04 RX ADMIN — FOLIC ACID 1 MG: 1 TABLET ORAL at 09:03

## 2024-03-04 RX ADMIN — DOCUSATE SODIUM AND SENNOSIDES 1 TABLET: 8.6; 5 TABLET, FILM COATED ORAL at 05:03

## 2024-03-04 RX ADMIN — ONDANSETRON 4 MG: 2 INJECTION INTRAMUSCULAR; INTRAVENOUS at 11:03

## 2024-03-04 RX ADMIN — Medication 100 MG: at 09:03

## 2024-03-04 RX ADMIN — ONDANSETRON 4 MG: 2 INJECTION INTRAMUSCULAR; INTRAVENOUS at 09:03

## 2024-03-04 RX ADMIN — THERA TABS 1 TABLET: TAB at 09:03

## 2024-03-04 RX ADMIN — MAGNESIUM SULFATE HEPTAHYDRATE 2 G: 40 INJECTION, SOLUTION INTRAVENOUS at 05:03

## 2024-03-04 RX ADMIN — BUTALBITAL, ACETAMINOPHEN, AND CAFFEINE 1 TABLET: 50; 325; 40 TABLET ORAL at 09:03

## 2024-03-04 RX ADMIN — GABAPENTIN 300 MG: 300 CAPSULE ORAL at 05:03

## 2024-03-04 RX ADMIN — DIBASIC SODIUM PHOSPHATE, MONOBASIC POTASSIUM PHOSPHATE AND MONOBASIC SODIUM PHOSPHATE 1 TABLET: 852; 155; 130 TABLET ORAL at 05:03

## 2024-03-04 RX ADMIN — Medication 1 PATCH: at 09:03

## 2024-03-04 RX ADMIN — FLUOXETINE 10 MG: 10 CAPSULE ORAL at 09:03

## 2024-03-04 RX ADMIN — LEVOTHYROXINE SODIUM 75 MCG: 75 TABLET ORAL at 05:03

## 2024-03-04 RX ADMIN — DIAZEPAM 10 MG: 5 TABLET ORAL at 10:03

## 2024-03-04 RX ADMIN — DIAZEPAM 10 MG: 5 TABLET ORAL at 01:03

## 2024-03-04 RX ADMIN — HUMAN ALBUMIN MICROSPHERES AND PERFLUTREN 0.66 MG: 10; .22 INJECTION, SOLUTION INTRAVENOUS at 12:03

## 2024-03-04 RX ADMIN — GABAPENTIN 300 MG: 300 CAPSULE ORAL at 09:03

## 2024-03-04 RX ADMIN — DIBASIC SODIUM PHOSPHATE, MONOBASIC POTASSIUM PHOSPHATE AND MONOBASIC SODIUM PHOSPHATE 1 TABLET: 852; 155; 130 TABLET ORAL at 10:03

## 2024-03-04 RX ADMIN — DICYCLOMINE HYDROCHLORIDE 20 MG: 20 TABLET ORAL at 05:03

## 2024-03-04 NOTE — SUBJECTIVE & OBJECTIVE
Interval History/Significant Events:   Pt seen and examined this AM , no acute events overnight. Reports having some headaches and feeling nauseous. Has been able tolerate PO intake. Continuing Diazepam taper for withdrawal. Pt is AAOx3 this AM. Plan for step down today.     Review of Systems   Constitutional:  Negative for fatigue and fever.   HENT:  Negative for congestion, ear pain and sore throat.    Eyes:  Negative for visual disturbance.   Respiratory:  Negative for cough, shortness of breath and wheezing.    Cardiovascular:  Negative for chest pain, palpitations and leg swelling.   Gastrointestinal:  Positive for nausea. Negative for abdominal pain, constipation, diarrhea and vomiting.   Genitourinary:  Negative for difficulty urinating, dysuria and hematuria.   Musculoskeletal:  Negative for arthralgias, back pain and joint swelling.   Skin:  Negative for rash.   Neurological:  Positive for headaches. Negative for dizziness, tremors, seizures, syncope, speech difficulty, weakness and light-headedness.   Psychiatric/Behavioral:  Negative for agitation and hallucinations. The patient is not nervous/anxious.      Objective:     Vital Signs (Most Recent):  Temp: 98.2 °F (36.8 °C) (03/04/24 0301)  Pulse: 64 (03/04/24 0501)  Resp: 17 (03/04/24 0501)  BP: (!) 111/54 (03/04/24 0501)  SpO2: 99 % (03/04/24 0501) Vital Signs (24h Range):  Temp:  [98.2 °F (36.8 °C)-98.7 °F (37.1 °C)] 98.2 °F (36.8 °C)  Pulse:  [58-99] 64  Resp:  [15-36] 17  SpO2:  [91 %-99 %] 99 %  BP: (100-152)/(51-88) 111/54   Weight: 89.8 kg (197 lb 15.6 oz)  Body mass index is 31.95 kg/m².      Intake/Output Summary (Last 24 hours) at 3/4/2024 0919  Last data filed at 3/3/2024 1705  Gross per 24 hour   Intake 1300 ml   Output --   Net 1300 ml          Physical Exam  Constitutional:       General: She is not in acute distress.     Appearance: She is obese. She is not ill-appearing.   HENT:      Head: Normocephalic and atraumatic.      Mouth/Throat:       Mouth: Mucous membranes are moist.   Eyes:      Extraocular Movements: Extraocular movements intact.      Conjunctiva/sclera: Conjunctivae normal.      Pupils: Pupils are equal, round, and reactive to light.   Neck:      Vascular: No carotid bruit.   Cardiovascular:      Rate and Rhythm: Normal rate and regular rhythm.      Heart sounds: No murmur heard.  Pulmonary:      Effort: No respiratory distress.      Breath sounds: No wheezing or rhonchi.   Abdominal:      General: Bowel sounds are normal.      Tenderness: There is no abdominal tenderness.   Musculoskeletal:         General: No swelling or tenderness.      Cervical back: No rigidity.      Right lower leg: No edema.      Left lower leg: No edema.      Comments: No tremors noted this AM    Skin:     General: Skin is warm.      Capillary Refill: Capillary refill takes less than 2 seconds.      Coloration: Skin is not jaundiced.      Findings: No rash.   Neurological:      Mental Status: She is alert and oriented to person, place, and time.      Cranial Nerves: No cranial nerve deficit.      Motor: Weakness present.      Comments: AAOx3    Psychiatric:         Attention and Perception: She does not perceive auditory or visual hallucinations.         Behavior: Behavior is slowed.         Thought Content: Thought content is not paranoid or delusional. Thought content does not include suicidal ideation.         Judgment: Judgment normal.            Vents:     Lines/Drains/Airways       Peripheral Intravenous Line  Duration                  Peripheral IV - Single Lumen 03/02/24 0331 24 G Right Antecubital 2 days         Peripheral IV - Single Lumen 03/03/24 0619 20 G Anterior;Distal;Left Forearm 1 day                  Significant Labs:    CBC/Anemia Profile:  Recent Labs   Lab 03/03/24  0536 03/04/24  0426   WBC 7.28 5.16   HGB 9.7* 9.9*   HCT 32.6* 33.7*   PLT 83* 72*   MCV 87 89   RDW 18.3* 18.6*        Chemistries:  Recent Labs   Lab 03/03/24  0536  03/04/24 0426    141   K 3.0* 3.2*   CL 96 103   CO2 27 26   BUN 6* 6*   CREATININE 0.8 0.8   CALCIUM 8.9 8.7   ALBUMIN 3.7 3.4*   PROT 6.2 5.8*   BILITOT 0.5 0.4   ALKPHOS 61 59   ALT 45* 42   AST 60* 44*   MG 2.0 1.7   PHOS 2.0* 2.6*     Recent Labs   Lab 03/01/24 1623 03/02/24 0327 03/03/24 0536 03/04/24 0426   WBC 28.71* 16.52* 7.28 5.16   HGB 10.2* 9.8* 9.7* 9.9*   HCT 34.4* 33.1* 32.6* 33.7*   MCV 88 88 87 89   RBC 3.89* 3.75* 3.74* 3.78*   MCH 26.2* 26.1* 25.9* 26.2*   MCHC 29.7* 29.6* 29.8* 29.4*   RDW 18.3* 18.3* 18.3* 18.6*   * 104* 83* 72*   MPV 9.5 10.0 10.5 10.3   GRAN 20.0* 27.0* 19.3*  1.4* 15.9*  0.8*   LYMPH 80.0* 69.0*  CANCELED 73.9*  5.4* 77.3*  4.0   MONO 0.0* 4.0  CANCELED 5.1  0.4 4.8  0.3   EOSINOPHIL 0.0 0.0 1.0 1.2   BASOPHIL 0.0 0.0 0.3 0.4       Recent Labs   Lab 03/01/24 1623 03/01/24 2244 03/02/24 0327 03/03/24 0536 03/04/24  0426    136 135* 137 141   K 5.0 4.0 4.8 3.0* 3.2*   CL 96 95 97 96 103   CO2 15* 16* 15* 27 26   BUN 13 11 10 6* 6*   CREATININE 0.8 0.9 1.1 0.8 0.8   GLU 53* 48* 66* 124* 142*   CALCIUM 9.0 8.7 8.8 8.9 8.7   PROT 7.6 6.9 7.1 6.2 5.8*   ALBUMIN 4.4 4.0 4.0 3.7 3.4*   ALKPHOS 63 62 60 61 59   BILITOT 0.6 0.7 0.7 0.5 0.4   ALT 47* 47* 47* 45* 42   AST 88* 82* 82* 60* 44*   ANIONGAP 27* 25* 23* 14 12   MG  --   --  1.7 2.0 1.7   PHOS  --   --  1.4* 2.0* 2.6*       Recent Labs   Lab 03/01/24  1721   COLORU Tara   APPEARANCEUA Hazy*   PHUR 5.0   SPECGRAV >=1.030*   PROTEINUA Negative   GLUCUA 3+*   KETONESU Negative   BILIRUBINUA Negative   OCCULTUA Negative   NITRITE Negative   LEUKOCYTESUR Negative   RBCUA 1   WBCUA 2   BACTERIA Occasional   SQUAMEPITHEL 5   MICROCMT SEE COMMENT       Recent Labs   Lab 03/01/24  2132   PH 7.392   PCO2 26.2*   PO2 109*   HCO3 15.9*   POCSATURATED 98   BE -9*       Recent Labs   Lab 03/01/24  1849   CPK 52       Recent Labs   Lab 03/02/24  0327   INR 1.0       Lab Results   Component Value Date     HGBA1C 6.0 (H) 02/11/2024       Recent Labs   Lab 03/01/24  1849   TSH 0.203*   FREET4 1.12       Microbiology Results (last 7 days)       Procedure Component Value Units Date/Time    Blood culture x two cultures. Draw prior to antibiotics. [[id number]] Collected: 03/01/24 2125    Order Status: Completed Specimen: Blood from Peripheral, Hand, Left Updated: 03/03/24 2222     Blood Culture, Routine No Growth to date      No Growth to date      No Growth to date    Narrative:      Aerobic and anaerobic    Blood culture x two cultures. Draw prior to antibiotics. [[id number]] Collected: 03/01/24 2118    Order Status: Completed Specimen: Blood from Peripheral, Antecubital, Left Updated: 03/03/24 2222     Blood Culture, Routine No Growth to date      No Growth to date      No Growth to date    Narrative:      Aerobic and anaerobic            Significant Imaging:  All pertinent imaging within the past 24 hours has been reviewed.

## 2024-03-04 NOTE — PLAN OF CARE
MICU DAILY GOALS     Family/Goals of care/Code Status   Code Status: Full Code    24H Vital Sign Range  Temp:  [98 °F (36.7 °C)-98.7 °F (37.1 °C)]   Pulse:  [58-99]   Resp:  [15-36]   BP: (100-164)/(51-88)   SpO2:  [90 %-99 %]      Shift Events (include procedures and significant events)   No acute events throughout shift. Stepdown orders in place. Administered  medications as prescribed. Replaced magnesium and potassium.     AWAKE RASS: Goal - RASS Goal: 0-->alert and calm  Actual - RASS (Royal Agitation-Sedation Scale): alert and calm    Restraint necessity: Not necessary   BREATHE SBT: Not intubated    Coordinate A & B, analgesics/sedatives Pain: managed   SAT: Not intubated   Delirium CAM-ICU: Overall CAM-ICU: Negative   Early(intubated/ Progressive (non-intubated) Mobility MOVE Screen (INTUBATED ONLY): Not intubated    Activity: Activity Management: Rolling - L1   Feeding/Nutrition Diet order: Diet/Nutrition Received: regular,     Thrombus DVT prophylaxis: VTE Required Core Measure: Pharmacological prophylaxis initiated/maintained   HOB Elevation Head of Bed (HOB) Positioning: HOB at 30-45 degrees   Ulcer Prophylaxis GI: yes   Glucose control managed Glycemic Management: blood glucose monitored   Skin Skin assessed during: Daily Assessment    Sacrum intact/not altered? Yes  Heels intact/not altered? Yes  Surgical wound? No    CHECK ONE!   (no altered skin or altered skin) and sub boxes:  [] No Altered Skin Integrity Present    []Prevention Measures Documented    [x] Altered Skin Integrity Present or Discovered   [x] LDA present in EPIC, daily doc completed              [] LDA added if not in EPIC (describe wound).                    When describing wound, do not stage, use descriptive words only.    [] Wound Image Taken (required on admit,                   transfer/discharge and every Tuesday)    Wound Care Consulted? Yes    Attending Nurse:     Second RN/Staff Member:    Bowel Function no issues     Indwelling Catheter Necessity         Pt walks to bathroom.    De-escalation Antibiotics Yes        VS and assessment per flow sheet, patient progressing towards goals as tolerated, plan of care reviewed with  Earl Bloom , all concerns addressed on my shift.

## 2024-03-04 NOTE — NURSING
...Nurses Note -- 4 Eyes      3/4/2024   10:55 AM      Skin assessed during: Transfer      [] No Altered Skin Integrity Present    []Prevention Measures Documented      [x] Yes- Altered Skin Integrity Present or Discovered   [x] LDA Added if Not in Epic (Describe Wound)   [] New Altered Skin Integrity was Present on Admit and Documented in LDA   [] Wound Image Taken    Wound Care Consulted? Yes    Attending Nurse:  Idania Shepherd RN/Staff Member:  Misti

## 2024-03-04 NOTE — CONSULTS
Consult acknowledged, patient seen. Please refer to Addiction Psychiatry note from 3/4 for full consult and recommendations.

## 2024-03-04 NOTE — PROGRESS NOTES
[facility] - Cardiac Medical ICU  Critical Care Medicine  Progress Note    Patient Name: Earl Abdul  MRN: [id number]  Admission Date: 3/1/2024  Hospital Length of Stay: 2 days  Code Status: Full Code  Attending Provider: Michael Murphy*  Primary Care Provider: Andrew Rodriguez MD   Principal Problem: Alcohol withdrawal    Subjective:     HPI:  63 yof pmh substance abuse, hx of sarcoidosis, HLD, DT, withdrawal seizures presenting with confusion, nausea and concern for withdrawals. Last drink was night prior to admission, states she drinks a couple glasses per day of vodka, goes through a few bottles a week. Pt is nauseated, but has not vomiting. Difficult getting history due to confusion and lethargy.    In the ED: pt has been given multiple doses of diazepam and multiple boluses, WBC 28, hgb 10.2, bicarb 15, anion gap 27, glucose 53, AST 88, ALT 47. Lactic 2.6, alcohol 81. Pt went into afib with RVR responded to 3rd dose of metoprolol and was back in NSR    Hospital/ICU Course:  Pt to presented to the ED on 3/1/2024 with complaint of nausea, confusion, & body aches. In the ED, the pt had signs of active EtOH withdrawal. Last drink was night prior to admission. Per history she drinks a couple glasses per day of vodka, goes through a few bottles a week. Pt was nauseated, but was not vomiting. Pt was treated with benzodiazepines & responded favorably. Pt had some atrial fib with RVR which responded to metoprolol IV. Now in NSR. Continuing valium taper.    Interval History/Significant Events:   Pt seen and examined this AM , no acute events overnight. Reports having some headaches and feeling nauseous. Has been able tolerate PO intake. Continuing Diazepam taper for withdrawal. Pt is AAOx3 this AM. Plan for step down today.     Review of Systems   Constitutional:  Negative for fatigue and fever.   HENT:  Negative for congestion, ear pain and sore throat.    Eyes:  Negative for visual disturbance.    Respiratory:  Negative for cough, shortness of breath and wheezing.    Cardiovascular:  Negative for chest pain, palpitations and leg swelling.   Gastrointestinal:  Positive for nausea. Negative for abdominal pain, constipation, diarrhea and vomiting.   Genitourinary:  Negative for difficulty urinating, dysuria and hematuria.   Musculoskeletal:  Negative for arthralgias, back pain and joint swelling.   Skin:  Negative for rash.   Neurological:  Positive for headaches. Negative for dizziness, tremors, seizures, syncope, speech difficulty, weakness and light-headedness.   Psychiatric/Behavioral:  Negative for agitation and hallucinations. The patient is not nervous/anxious.      Objective:     Vital Signs (Most Recent):  Temp: 98.2 °F (36.8 °C) (03/04/24 0301)  Pulse: 64 (03/04/24 0501)  Resp: 17 (03/04/24 0501)  BP: (!) 111/54 (03/04/24 0501)  SpO2: 99 % (03/04/24 0501) Vital Signs (24h Range):  Temp:  [98.2 °F (36.8 °C)-98.7 °F (37.1 °C)] 98.2 °F (36.8 °C)  Pulse:  [58-99] 64  Resp:  [15-36] 17  SpO2:  [91 %-99 %] 99 %  BP: (100-152)/(51-88) 111/54   Weight: 89.8 kg (197 lb 15.6 oz)  Body mass index is 31.95 kg/m².      Intake/Output Summary (Last 24 hours) at 3/4/2024 0919  Last data filed at 3/3/2024 1705  Gross per 24 hour   Intake 1300 ml   Output --   Net 1300 ml          Physical Exam  Constitutional:       General: She is not in acute distress.     Appearance: She is obese. She is not ill-appearing.   HENT:      Head: Normocephalic and atraumatic.      Mouth/Throat:      Mouth: Mucous membranes are moist.   Eyes:      Extraocular Movements: Extraocular movements intact.      Conjunctiva/sclera: Conjunctivae normal.      Pupils: Pupils are equal, round, and reactive to light.   Neck:      Vascular: No carotid bruit.   Cardiovascular:      Rate and Rhythm: Normal rate and regular rhythm.      Heart sounds: No murmur heard.  Pulmonary:      Effort: No respiratory distress.      Breath sounds: No wheezing or  rhonchi.   Abdominal:      General: Bowel sounds are normal.      Tenderness: There is no abdominal tenderness.   Musculoskeletal:         General: No swelling or tenderness.      Cervical back: No rigidity.      Right lower leg: No edema.      Left lower leg: No edema.      Comments: No tremors noted this AM    Skin:     General: Skin is warm.      Capillary Refill: Capillary refill takes less than 2 seconds.      Coloration: Skin is not jaundiced.      Findings: No rash.   Neurological:      Mental Status: She is alert and oriented to person, place, and time.      Cranial Nerves: No cranial nerve deficit.      Motor: Weakness present.      Comments: AAOx3    Psychiatric:         Attention and Perception: She does not perceive auditory or visual hallucinations.         Behavior: Behavior is slowed.         Thought Content: Thought content is not paranoid or delusional. Thought content does not include suicidal ideation.         Judgment: Judgment normal.            Vents:     Lines/Drains/Airways       Peripheral Intravenous Line  Duration                  Peripheral IV - Single Lumen 03/02/24 0331 24 G Right Antecubital 2 days         Peripheral IV - Single Lumen 03/03/24 0619 20 G Anterior;Distal;Left Forearm 1 day                  Significant Labs:    CBC/Anemia Profile:  Recent Labs   Lab 03/03/24  0536 03/04/24  0426   WBC 7.28 5.16   HGB 9.7* 9.9*   HCT 32.6* 33.7*   PLT 83* 72*   MCV 87 89   RDW 18.3* 18.6*        Chemistries:  Recent Labs   Lab 03/03/24  0536 03/04/24  0426    141   K 3.0* 3.2*   CL 96 103   CO2 27 26   BUN 6* 6*   CREATININE 0.8 0.8   CALCIUM 8.9 8.7   ALBUMIN 3.7 3.4*   PROT 6.2 5.8*   BILITOT 0.5 0.4   ALKPHOS 61 59   ALT 45* 42   AST 60* 44*   MG 2.0 1.7   PHOS 2.0* 2.6*     Recent Labs   Lab 03/01/24  1623 03/02/24  0327 03/03/24  0536 03/04/24  0426   WBC 28.71* 16.52* 7.28 5.16   HGB 10.2* 9.8* 9.7* 9.9*   HCT 34.4* 33.1* 32.6* 33.7*   MCV 88 88 87 89   RBC 3.89* 3.75* 3.74*  3.78*   MCH 26.2* 26.1* 25.9* 26.2*   MCHC 29.7* 29.6* 29.8* 29.4*   RDW 18.3* 18.3* 18.3* 18.6*   * 104* 83* 72*   MPV 9.5 10.0 10.5 10.3   GRAN 20.0* 27.0* 19.3*  1.4* 15.9*  0.8*   LYMPH 80.0* 69.0*  CANCELED 73.9*  5.4* 77.3*  4.0   MONO 0.0* 4.0  CANCELED 5.1  0.4 4.8  0.3   EOSINOPHIL 0.0 0.0 1.0 1.2   BASOPHIL 0.0 0.0 0.3 0.4       Recent Labs   Lab 03/01/24  1623 03/01/24  2244 03/02/24  0327 03/03/24  0536 03/04/24  0426    136 135* 137 141   K 5.0 4.0 4.8 3.0* 3.2*   CL 96 95 97 96 103   CO2 15* 16* 15* 27 26   BUN 13 11 10 6* 6*   CREATININE 0.8 0.9 1.1 0.8 0.8   GLU 53* 48* 66* 124* 142*   CALCIUM 9.0 8.7 8.8 8.9 8.7   PROT 7.6 6.9 7.1 6.2 5.8*   ALBUMIN 4.4 4.0 4.0 3.7 3.4*   ALKPHOS 63 62 60 61 59   BILITOT 0.6 0.7 0.7 0.5 0.4   ALT 47* 47* 47* 45* 42   AST 88* 82* 82* 60* 44*   ANIONGAP 27* 25* 23* 14 12   MG  --   --  1.7 2.0 1.7   PHOS  --   --  1.4* 2.0* 2.6*       Recent Labs   Lab 03/01/24  1721   COLORU Tara   APPEARANCEUA Hazy*   PHUR 5.0   SPECGRAV >=1.030*   PROTEINUA Negative   GLUCUA 3+*   KETONESU Negative   BILIRUBINUA Negative   OCCULTUA Negative   NITRITE Negative   LEUKOCYTESUR Negative   RBCUA 1   WBCUA 2   BACTERIA Occasional   SQUAMEPITHEL 5   MICROCMT SEE COMMENT       Recent Labs   Lab 03/01/24 2132   PH 7.392   PCO2 26.2*   PO2 109*   HCO3 15.9*   POCSATURATED 98   BE -9*       Recent Labs   Lab 03/01/24  1849   CPK 52       Recent Labs   Lab 03/02/24  0327   INR 1.0       Lab Results   Component Value Date    HGBA1C 6.0 (H) 02/11/2024       Recent Labs   Lab 03/01/24  1849   TSH 0.203*   FREET4 1.12       Microbiology Results (last 7 days)       Procedure Component Value Units Date/Time    Blood culture x two cultures. Draw prior to antibiotics. [[id number]] Collected: 03/01/24 2125    Order Status: Completed Specimen: Blood from Peripheral, Hand, Left Updated: 03/03/24 2222     Blood Culture, Routine No Growth to date      No Growth to date      No Growth  to date    Narrative:      Aerobic and anaerobic    Blood culture x two cultures. Draw prior to antibiotics. [[id number]] Collected: 03/01/24 2118    Order Status: Completed Specimen: Blood from Peripheral, Antecubital, Left Updated: 03/03/24 2222     Blood Culture, Routine No Growth to date      No Growth to date      No Growth to date    Narrative:      Aerobic and anaerobic            Significant Imaging:  All pertinent imaging within the past 24 hours has been reviewed.     ABG  Recent Labs   Lab 03/01/24 2132   PH 7.392   PO2 109*   PCO2 26.2*   HCO3 15.9*   BE -9*     Assessment/Plan:     Psychiatric  * Alcohol withdrawal  Pt has history of withdrawal symptoms. See alcohol use disorder  -continuing benzo taper     Alcohol use disorder, severe, dependence  Pt has extensive alcohol history. Pt also has history of withdrawal seizures in the past. Pt last drink was the night PTA. Pt normally drinks multiple glasses of vodka a day and goes through multiple bottles per week.     Plan   CIWA q4hrs   Lorazepam 2mg q4h IV if CIWA >8  Diazepam 10mg oral q8h scheduled,   Folate   thiamine    Cardiac/Vascular  Atrial fibrillation  Pt presented to the hospital for alcohol withdrawal. While in the ER on telemetry patient's heart rate shot up into the 160s with irregular rhythm. Pt was feeling nauseated but no other symptoms noted. Pt does not know of a history of abnormal rhythm. Chads-vasc 4, Has-bled 2. No prior episodes noted in chart.   3/4- denied any chest or palpitations overnight     Plan  Metoprolol tartrate IV 5mg x3 doses. A fib broke after 3rd dose of metoprolol, back in NSR  If patient becomes hemodynamically unstable may need cardioversion  Cardiac monitoring  If concern stat EKG  Metoprolol tartrate 25mg BID          Endocrine  Type 2 diabetes mellitus with hypoglycemia  Pt has been dealing with hypoglycemia during hospital stay. Pt has required multiple bolus and sugary drinks. Most likely due to her  alcohol use.    3/4- BG this  , pt is tolerating PO     Plan   Hold insulin while dealing with hypoglycemia  Hold metformin  D10 bolus if sugars below <60    GI  Nausea  Zofran prn for nausea, Qtc 436.        Critical Care Daily Checklist:    A: Awake: RASS Goal/Actual Goal: RASS Goal: 0-->alert and calm  Actual:     B: Spontaneous Breathing Trial Performed?     C: SAT & SBT Coordinated?  N/a                      D: Delirium: CAM-ICU Overall CAM-ICU: Negative   E: Early Mobility Performed? Yes   F: Feeding Goal:    Status:     Current Diet Order   Procedures    Diet Adult Regular (IDDSI Level 7)      AS: Analgesia/Sedation N/a   T: Thromboembolic Prophylaxis Lovenox   H: HOB > 300 Yes   U: Stress Ulcer Prophylaxis (if needed) no   G: Glucose Control holding   B: Bowel Function Stool Occurrence: 1   I: Indwelling Catheter (Lines & Boudreaux) Necessity PIV   D: De-escalation of Antimicrobials/Pharmacotherapies N/a    Plan for the day/ETD Plan for stepdown today     Code Status:  Family/Goals of Care: Full Code              Critical care was time spent personally by me on the following activities: development of treatment plan with patient or surrogate and bedside caregivers, discussions with consultants, evaluation of patient's response to treatment, examination of patient, ordering and performing treatments and interventions, ordering and review of laboratory studies, ordering and review of radiographic studies, pulse oximetry, re-evaluation of patient's condition. This critical care time did not overlap with that of any other provider or involve time for any procedures.     John Paul Barba MD  Critical Care Medicine  [facility] - Cardiac Medical ICU

## 2024-03-04 NOTE — CONSULTS
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     QTc Int : 405 ms    Normal sinus rhythm  Normal ECG  When compared with ECG of 01-MAR-2024 23:17,  Sinus rhythm has replaced AF/AFlutter  Vent. rate has decreased BY  61 BPM    Confirmed by Salvador Munoz MD ([id number]) on 3/2/2024 7:45:55 AM    Referred By: [email or web address]   SELF           Confirmed By:Salvador Munoz MD       Results for orders placed or performed during the hospital encounter of 02/10/24   CT Head Without Contrast    Narrative    EXAMINATION:  CT HEAD WITHOUT CONTRAST    CLINICAL HISTORY:  Head trauma, minor (Age >= 65y);    TECHNIQUE:  Low dose axial images were obtained through the head.  Coronal and sagittal reformations were also performed. Contrast was not administered.    COMPARISON:  11/05/2023    FINDINGS:  There is motion artifact.    There is generalized cerebral volume loss.  There is hypoattenuation in a periventricular fashion, likely sequela of chronic microvascular ischemic change.  There is no evidence of acute major vascular territory infarct, hemorrhage, or mass.  There is no hydrocephalus.  There are no abnormal extra-axial fluid collections.   The paranasal sinuses and mastoid air cells are clear, and there is no evidence of calvarial fracture.  The visualized soft tissues are remarkable for induration overlying the posterior occipital region on the right near the vertex.  Additional induration is noted overlying the left maxillary sinus/zygomatic region.  The globes are intact.  No postseptal edema..      Impression    1. Allowing for motion artifact, no convincing acute intracranial abnormalities noting sequela of chronic microvascular ischemic change and senescent change.  2. Soft tissue induration overlies the right posterior occipital region at the vertex, and left maxillary sinus/zygomatic arch.  No underlying fracture.      Electronically signed by: Shon Boles MD  Date:    02/10/2024  Time:    15:43   Results for orders placed or performed during the hospital encounter of 06/10/22   MRI Brain Without Contrast    Narrative    EXAMINATION:  MRI BRAIN WITHOUT CONTRAST    CLINICAL HISTORY:  hx of PRES;    TECHNIQUE:  Multiplanar multisequence MR imaging of the brain was performed without intravenous contrast.    COMPARISON:  Head CT 06/10/2022, brain MRI 10/04/2017, 07/21/2017    FINDINGS:  Intracranial Compartment:    Ventricles are normal in size for age without evidence of hydrocephalus.    Ill-defined focus T2-FLAIR signal hyperintensity in the right periatrial white matter.  Few additional scattered punctate foci elsewhere within the supratentorial white matter.  These appear similar to the prior study of 10/04/2017.  No definite new focal lesions.  No diffusion restriction to indicate an acute infarction.  No remote major vascular distribution infarct.  No recent or remote hemorrhage.  No mass effect or midline shift.    No extra-axial blood or fluid collections.    Normal vascular flow voids are preserved.    Skull/Extracranial Contents (limited evaluation):    Bone marrow signal intensity is normal.      Impression    No evidence of acute  intracranial pathology.      Electronically signed by: Miguel Malagon MD  Date:    06/10/2022  Time:    09:45       CONSULTATION:     A diagnostic psychiatric evaluation was performed and responsiveness to treatment was assessed.  The patient demonstrates adequate ability/capacity to respond to treatment.    Consults

## 2024-03-04 NOTE — DISCHARGE INSTRUCTIONS
[facility] Outpatient Clinics  - [facility]: [address] [place] [address]iqui, [phone]  - [place] [facility]: [address] [facility], [phone]  - [facility]: [address], [place], [phone]  - [facility]: [address] [facility], [phone]  - [facility] ([facility]): [address], [phone]  - [facility] ([facility]): [address] [facility] Leah Montoya, [phone]  - Yoselyn Moreno [facility]): [phone]  - [facility] [phone]     [facility]  - [facility]: [address] 4th floor, [phone]  - [facility]: [address] [place] [address], [phone]  - [facility]: [facility] [facility], [phone], [phone]  - [facility]: [address], [address], [phone]    Outpatient Group Therapy  - Cancer Support Group: [facility], [address], Chris Palumbo, [phone]  - Depression/Bipolar Support [place]: [facility], [address], Mehul, 7:30pm 1st & 3rd Tuesdays in cafeteria, [phone]  - Heart Transplant Support Group: [facility], 3rd Wednesday, 7th floor conference room, Felisa Guevaratiz, [phone]  - [facility] [place] [facility] ([facility]): [address], [place], [phone]  - [facility]: [place] room [address], [place]en [place]og[place]era, [phone]    Outpatient Drug Rehab   - [facility]: [address], 4th Floor, Alexsuraj Cortes, [phone]  - Alcoholics Anonymous: www.[employer].org, 24 Hr Hotline:462.762.8005, Main: [phone]  - [place] [facility]: [address], [phone], Tuesdays at 10am, Michael Christina (ext. [phone])  - [facility]: [address], [phone]  - [facility]: [address] [place] [address]  Leah Montoya, [phone]  - [facility]: [address], [phone]    Inpatient Drug Rehab  - [facility]: [address], [phone]   - [facility]: [address], [phone]   - [facility]: [address], [place], [phone]  - [facility]: [phone]  - [facility] <females only> ([employer]): [address] [facility], [phone], ext [phone], Dian Casanova  - [facility] <Fee based>: [address] [facility], [phone]  -[facility]: [address], [place] [address]    Crisis  - [facility] Hotline, [phone]  - National Suicide Prevention Crisis Hotline: 214.683.6273

## 2024-03-04 NOTE — ASSESSMENT & PLAN NOTE
Pt presented to the hospital for alcohol withdrawal. While in the ER on telemetry patient's heart rate shot up into the 160s with irregular rhythm. Pt was feeling nauseated but no other symptoms noted. Pt does not know of a history of abnormal rhythm. Chads-vasc 4, Has-bled 2. No prior episodes noted in chart.   3/4- denied any chest or palpitations overnight     Plan  Metoprolol tartrate IV 5mg x3 doses. A fib broke after 3rd dose of metoprolol, back in NSR  If patient becomes hemodynamically unstable may need cardioversion  Cardiac monitoring  If concern stat EKG  Metoprolol tartrate 25mg BID

## 2024-03-04 NOTE — ASSESSMENT & PLAN NOTE
Pt has been dealing with hypoglycemia during hospital stay. Pt has required multiple bolus and sugary drinks. Most likely due to her alcohol use.    3/4- BG this  , pt is tolerating PO     Plan   Hold insulin while dealing with hypoglycemia  Hold metformin  D10 bolus if sugars below <60

## 2024-03-04 NOTE — PLAN OF CARE
03/04/24 1324   Discharge Reassessment   Assessment Type Discharge Planning Reassessment   Did the patient's condition or plan change since previous assessment? No   Discharge Plan discussed with: Patient   Communicated BECCA with patient/caregiver Yes   Discharge Plan A Home Health   Discharge Plan B Home with family   DME Needed Upon Discharge  other (see comments)  (TBD)   Transition of Care Barriers Does not adhere to care plan;Substance Abuse   Why the patient remains in the hospital Requires continued medical care   Post-Acute Status   Post-Acute Authorization [facility] Status Set-up Complete/Auth obtained  ([facility] Phone: [phone])   Hospital Resources/Appts/Education Provided Appointments scheduled and added to S   Patient choice form signed by patient/caregiver List from System Post-Acute Care;List with quality metrics by geographic area provided   Discharge Delays (!) Procedure Scheduling (IR, OR, Labs, Echo, Cath, Echo, EEG)     CM met with patient/family to discuss any changes in discharge planning.  Patients plan is to dc to home health. [facility]    No changes in DC plans. BECCA: 3/6/24    CM sent updated BECCA  to [facility] Phone: [phone]     via [place] [place].        Nusrat Gaxiola RN  Case Management  [facility]  [phone]

## 2024-03-04 NOTE — NURSING TRANSFER
Nursing Transfer Note      3/4/2024   10:55 AM    Nurse giving handoff:CLAUDIA Krueger  Nurse receiving handoff:LINA Emerson    Reason patient is being transferred: step-down    Transfer To: [facility]    Transfer via wheelchair    Transfer with cardiac monitoring    Transported by RN    Telemetry: Box Number [id number]  Order for Tele Monitor? Yes    Additional Lines: N/A    4eyes on Skin: yes    Medicines sent: N/A    Any special needs or follow-up needed: N/A    Patient belongings transferred with patient: Yes    Chart send with patient: Yes    Notified: spouse    Patient reassessed at: 3/4/24 1055 (date, time)  1  Upon arrival to floor: cardiac monitor applied, patient oriented to room, call bell in reach, and bed in lowest position

## 2024-03-05 LAB
ALBUMIN SERPL BCP-MCNC: 3.1 G/DL (ref 3.5–5.2)
ALP SERPL-CCNC: 64 U/L (ref 55–135)
ALT SERPL W/O P-5'-P-CCNC: 41 U/L (ref 10–44)
ANION GAP SERPL CALC-SCNC: 9 MMOL/L (ref 8–16)
AST SERPL-CCNC: 36 U/L (ref 10–40)
BASOPHILS # BLD AUTO: 0.02 K/UL (ref 0–0.2)
BASOPHILS NFR BLD: 0.4 % (ref 0–1.9)
BILIRUB SERPL-MCNC: 0.2 MG/DL (ref 0.1–1)
BUN SERPL-MCNC: 8 MG/DL (ref 8–23)
CALCIUM SERPL-MCNC: 9.3 MG/DL (ref 8.7–10.5)
CHLORIDE SERPL-SCNC: 102 MMOL/L (ref 95–110)
CO2 SERPL-SCNC: 29 MMOL/L (ref 23–29)
CREAT SERPL-MCNC: 0.8 MG/DL (ref 0.5–1.4)
DIFFERENTIAL METHOD BLD: ABNORMAL
EOSINOPHIL # BLD AUTO: 0.1 K/UL (ref 0–0.5)
EOSINOPHIL NFR BLD: 1.1 % (ref 0–8)
ERYTHROCYTE [DISTWIDTH] IN BLOOD BY AUTOMATED COUNT: 19 % (ref 11.5–14.5)
EST. GFR  (NO RACE VARIABLE): >60 ML/MIN/1.73 M^2
GLUCOSE SERPL-MCNC: 235 MG/DL (ref 70–110)
HCT VFR BLD AUTO: 30.4 % (ref 37–48.5)
HGB BLD-MCNC: 9.1 G/DL (ref 12–16)
IMM GRANULOCYTES # BLD AUTO: 0.02 K/UL (ref 0–0.04)
IMM GRANULOCYTES NFR BLD AUTO: 0.4 % (ref 0–0.5)
LYMPHOCYTES # BLD AUTO: 4.1 K/UL (ref 1–4.8)
LYMPHOCYTES NFR BLD: 72.7 % (ref 18–48)
MAGNESIUM SERPL-MCNC: 1.6 MG/DL (ref 1.6–2.6)
MCH RBC QN AUTO: 26.6 PG (ref 27–31)
MCHC RBC AUTO-ENTMCNC: 29.9 G/DL (ref 32–36)
MCV RBC AUTO: 89 FL (ref 82–98)
MONOCYTES # BLD AUTO: 0.5 K/UL (ref 0.3–1)
MONOCYTES NFR BLD: 8.1 % (ref 4–15)
NEUTROPHILS # BLD AUTO: 1 K/UL (ref 1.8–7.7)
NEUTROPHILS NFR BLD: 17.3 % (ref 38–73)
NRBC BLD-RTO: 0 /100 WBC
PHOSPHATE SERPL-MCNC: 3.5 MG/DL (ref 2.7–4.5)
PLATELET # BLD AUTO: 69 K/UL (ref 150–450)
PLATELET BLD QL SMEAR: ABNORMAL
PMV BLD AUTO: 10.5 FL (ref 9.2–12.9)
POLYCHROMASIA BLD QL SMEAR: ABNORMAL
POTASSIUM SERPL-SCNC: 4 MMOL/L (ref 3.5–5.1)
PROT SERPL-MCNC: 5.5 G/DL (ref 6–8.4)
RBC # BLD AUTO: 3.42 M/UL (ref 4–5.4)
SODIUM SERPL-SCNC: 140 MMOL/L (ref 136–145)
TARGETS BLD QL SMEAR: ABNORMAL
WBC # BLD AUTO: 5.57 K/UL (ref 3.9–12.7)

## 2024-03-05 PROCEDURE — 97161 PT EVAL LOW COMPLEX 20 MIN: CPT

## 2024-03-05 PROCEDURE — 25000003 PHARM REV CODE 250: Performed by: INTERNAL MEDICINE

## 2024-03-05 PROCEDURE — 25000003 PHARM REV CODE 250

## 2024-03-05 PROCEDURE — 36415 COLL VENOUS BLD VENIPUNCTURE: CPT

## 2024-03-05 PROCEDURE — 97165 OT EVAL LOW COMPLEX 30 MIN: CPT

## 2024-03-05 PROCEDURE — 83735 ASSAY OF MAGNESIUM: CPT

## 2024-03-05 PROCEDURE — 85025 COMPLETE CBC W/AUTO DIFF WBC: CPT

## 2024-03-05 PROCEDURE — 63600175 PHARM REV CODE 636 W HCPCS: Performed by: INTERNAL MEDICINE

## 2024-03-05 PROCEDURE — S4991 NICOTINE PATCH NONLEGEND: HCPCS | Performed by: STUDENT IN AN ORGANIZED HEALTH CARE EDUCATION/TRAINING PROGRAM

## 2024-03-05 PROCEDURE — 25000003 PHARM REV CODE 250: Performed by: HOSPITALIST

## 2024-03-05 PROCEDURE — 80053 COMPREHEN METABOLIC PANEL: CPT

## 2024-03-05 PROCEDURE — 20600001 HC STEP DOWN PRIVATE ROOM

## 2024-03-05 PROCEDURE — 84100 ASSAY OF PHOSPHORUS: CPT

## 2024-03-05 PROCEDURE — 25000003 PHARM REV CODE 250: Performed by: STUDENT IN AN ORGANIZED HEALTH CARE EDUCATION/TRAINING PROGRAM

## 2024-03-05 RX ORDER — ESCITALOPRAM OXALATE 20 MG/1
20 TABLET ORAL DAILY
Status: DISCONTINUED | OUTPATIENT
Start: 2024-03-06 | End: 2024-03-06 | Stop reason: HOSPADM

## 2024-03-05 RX ORDER — DIAZEPAM 5 MG/1
5 TABLET ORAL EVERY 12 HOURS
Status: DISCONTINUED | OUTPATIENT
Start: 2024-03-05 | End: 2024-03-06

## 2024-03-05 RX ORDER — NALTREXONE HYDROCHLORIDE 50 MG/1
50 TABLET, FILM COATED ORAL DAILY
Status: DISCONTINUED | OUTPATIENT
Start: 2024-03-05 | End: 2024-03-06 | Stop reason: HOSPADM

## 2024-03-05 RX ORDER — DIAZEPAM 5 MG/1
5 TABLET ORAL EVERY 8 HOURS
Status: DISCONTINUED | OUTPATIENT
Start: 2024-03-05 | End: 2024-03-05

## 2024-03-05 RX ADMIN — LEVOTHYROXINE SODIUM 75 MCG: 75 TABLET ORAL at 05:03

## 2024-03-05 RX ADMIN — GABAPENTIN 300 MG: 300 CAPSULE ORAL at 02:03

## 2024-03-05 RX ADMIN — NALTREXONE HYDROCHLORIDE 50 MG: 50 TABLET, FILM COATED ORAL at 11:03

## 2024-03-05 RX ADMIN — BUTALBITAL, ACETAMINOPHEN, AND CAFFEINE 1 TABLET: 50; 325; 40 TABLET ORAL at 12:03

## 2024-03-05 RX ADMIN — Medication 100 MG: at 09:03

## 2024-03-05 RX ADMIN — LORAZEPAM 2 MG: 2 INJECTION INTRAMUSCULAR; INTRAVENOUS at 01:03

## 2024-03-05 RX ADMIN — THERA TABS 1 TABLET: TAB at 09:03

## 2024-03-05 RX ADMIN — GABAPENTIN 300 MG: 300 CAPSULE ORAL at 09:03

## 2024-03-05 RX ADMIN — PANTOPRAZOLE SODIUM 40 MG: 40 TABLET, DELAYED RELEASE ORAL at 09:03

## 2024-03-05 RX ADMIN — Medication 1 PATCH: at 09:03

## 2024-03-05 RX ADMIN — FOLIC ACID 1 MG: 1 TABLET ORAL at 09:03

## 2024-03-05 RX ADMIN — APIXABAN 5 MG: 5 TABLET, FILM COATED ORAL at 09:03

## 2024-03-05 RX ADMIN — GABAPENTIN 300 MG: 300 CAPSULE ORAL at 08:03

## 2024-03-05 RX ADMIN — FLUOXETINE HYDROCHLORIDE 20 MG: 20 CAPSULE ORAL at 09:03

## 2024-03-05 RX ADMIN — DIAZEPAM 5 MG: 5 TABLET ORAL at 08:03

## 2024-03-05 RX ADMIN — DIAZEPAM 10 MG: 5 TABLET ORAL at 05:03

## 2024-03-05 RX ADMIN — METOPROLOL TARTRATE 25 MG: 25 TABLET, FILM COATED ORAL at 09:03

## 2024-03-05 RX ADMIN — APIXABAN 5 MG: 5 TABLET, FILM COATED ORAL at 08:03

## 2024-03-05 RX ADMIN — METOPROLOL TARTRATE 25 MG: 25 TABLET, FILM COATED ORAL at 08:03

## 2024-03-05 RX ADMIN — TRAZODONE HYDROCHLORIDE 200 MG: 100 TABLET ORAL at 08:03

## 2024-03-05 NOTE — ASSESSMENT & PLAN NOTE
Patient has hypokalemia which is Acute and currently uncontrolled. Most recent potassium levels reviewed-   Lab Results   Component Value Date    K 4.0 03/05/2024   . Will continue potassium replacement per protocol and recheck repeat levels after replacement completed.

## 2024-03-05 NOTE — ASSESSMENT & PLAN NOTE
Chronic, controlled. Latest blood pressure and vitals reviewed-     Temp:  [97.5 °F (36.4 °C)-98.3 °F (36.8 °C)]   Pulse:  [64-80]   Resp:  [16-18]   BP: (111-146)/(57-79)   SpO2:  [93 %-98 %] .   Home meds for hypertension were reviewed and noted below.   Hypertension Medications               losartan-hydrochlorothiazide 100-25 mg (HYZAAR) 100-25 mg per tablet Take 1 tablet by mouth once daily.            While in the hospital, will manage blood pressure as follows;   Due to fluctuation in blood pressure and starting of metoprolol for atrial fibrillation we will monitor  We will restart her home medicine losartan-HCTZ according to the blood pressure response    Will utilize p.r.n. blood pressure medication only if patient's blood pressure greater than 180/110 and she develops symptoms such as worsening chest pain or shortness of breath.

## 2024-03-05 NOTE — ASSESSMENT & PLAN NOTE
Patient's anemia is currently controlled. Has not received any PRBCs to date. 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

## 2024-03-05 NOTE — PLAN OF CARE
Problem: Fluid and Electrolyte Imbalance (Acute Kidney Injury/Impairment)  Goal: Fluid and Electrolyte Balance  Outcome: Ongoing, Progressing     Problem: Oral Intake Inadequate (Acute Kidney Injury/Impairment)  Goal: Optimal Nutrition Intake  Outcome: Ongoing, Progressing     Problem: Renal Function Impairment (Acute Kidney Injury/Impairment)  Goal: Effective Renal Function  Outcome: Ongoing, Progressing     Problem: Impaired Wound Healing  Goal: Optimal Wound Healing  Outcome: Ongoing, Progressing

## 2024-03-05 NOTE — ASSESSMENT & PLAN NOTE
Tylenol given for headache  Encouraged to ask for Ondansetron for nausea  Encouraged to eat meals on time

## 2024-03-05 NOTE — PLAN OF CARE
Problem: Adult Inpatient Plan of Care  Goal: Plan of Care Review  3/5/2024 0448 by Lorin Bee RN  Outcome: Ongoing, Progressing  Flowsheets (Taken 3/5/2024 0448)  Plan of Care Reviewed With: patient  3/5/2024 0448 by Lorin Bee RN  Outcome: Ongoing, Progressing  Goal: Patient-Specific Goal (Individualized)  3/5/2024 0448 by Lorin Bee RN  Outcome: Ongoing, Progressing  3/5/2024 0448 by Lorin Bee RN  Outcome: Ongoing, Progressing  Goal: Absence of Hospital-Acquired Illness or Injury  3/5/2024 0448 by Lorin Bee RN  Outcome: Ongoing, Progressing  3/5/2024 0448 by Lorin Bee RN  Outcome: Ongoing, Progressing  Goal: Optimal Comfort and Wellbeing  3/5/2024 0448 by Lorin Bee RN  Outcome: Ongoing, Progressing  3/5/2024 0448 by Lorin Bee RN  Outcome: Ongoing, Progressing  Intervention: Provide Person-Centered Care  Flowsheets (Taken 3/5/2024 0448)  Trust Relationship/Rapport:   care explained   choices provided   reassurance provided   thoughts/feelings acknowledged   emotional support provided   empathic listening provided   questions answered   questions encouraged  Goal: Readiness for Transition of Care  3/5/2024 0448 by Lorin Bee RN  Outcome: Ongoing, Progressing  3/5/2024 0448 by Lorin Bee RN  Outcome: Ongoing, Progressing     Problem: Diabetes Comorbidity  Goal: Blood Glucose Level Within Targeted Range  3/5/2024 0448 by Lorin Bee RN  Outcome: Ongoing, Progressing  3/5/2024 0448 by Lorin Bee RN  Outcome: Ongoing, Progressing     Problem: Fluid and Electrolyte Imbalance (Acute Kidney Injury/Impairment)  Goal: Fluid and Electrolyte Balance  3/5/2024 0448 by Lorin Bee RN  Outcome: Ongoing, Progressing  3/5/2024 0448 by Lorin Bee RN  Outcome: Ongoing, Progressing     Problem: Oral Intake Inadequate (Acute Kidney Injury/Impairment)  Goal: Optimal Nutrition Intake  3/5/2024 0448 by Lorin Bee RN  Outcome:  Ongoing, Progressing  3/5/2024 0448 by Lorin Bee, RN  Outcome: Ongoing, Progressing  Intervention: Promote and Optimize Nutrition  Flowsheets (Taken 3/5/2024 0448)  Oral Nutrition Promotion: safe use of adaptive equipment encouraged     Problem: Renal Function Impairment (Acute Kidney Injury/Impairment)  Goal: Effective Renal Function  3/5/2024 0448 by Lorin Bee, RN  Outcome: Ongoing, Progressing  3/5/2024 0448 by Lorin Bee, RN  Outcome: Ongoing, Progressing     Problem: Impaired Wound Healing  Goal: Optimal Wound Healing  3/5/2024 0448 by Lorin Bee, RN  Outcome: Ongoing, Progressing  3/5/2024 0448 by Lorin Bee, RN  Outcome: Ongoing, Progressing

## 2024-03-05 NOTE — ASSESSMENT & PLAN NOTE
- Patient with Paroxysmal (<7 days) atrial fibrillation which is controlled currently with Beta Blocker. Patient is currently in sinus rhythm.[id number] Score: 4. HASBLED Score: 2   - Anticoagulation with Eliquis 5 mg BID  - CT scan Head negative for any acute intracranial process  - EKG 3/1/24 showed atrial fibrillation with RVR  - TTE showed normal function, EF 55 - 60%.   - Follow up with cardiology outpatient

## 2024-03-05 NOTE — PROGRESS NOTES
[facility] - Stepdown Flex ([place] [address])  [facility]  Progress Note    Patient Name: Earl Abdul  MRN: [id number]  Patient Class: IP- Inpatient   Admission Date: 3/1/2024  Length of Stay: 3 days  Attending Physician: Kvng Hargrove MD  Primary Care Provider: Andrew Rodriguez MD        Subjective:     Principal Problem:Alcohol withdrawal        HPI:  Earl Abdul is a 63 y.o. female w/ a PMHx of EtOH use disorder, CLL not currently on any treatment, COPD, HTN, depression on multiple antidepressants, right breast cancer s/p bilateral mastectomy, hypothyroidism. Patient presents to the ED today for generalized fatigue and nausea.  Patient states that these are both chronic issues, but her nauseous becoming unbearable.  Patient was seen on 02/20 for similar complaints and was discharged w/ prescription for Zofran, which he states has not been helping her nausea.  She was admitted for similar complaints of alcohol withdrawal and atraumatic rhabdomyolysis from 2/10-2/14/24.  In the ED, the pt had signs of active EtOH withdrawal. Last drink was night prior to admission. Per history she drinks a couple glasses per day of vodka, goes through a few bottles a week. Pt was nauseated, but was not vomiting. Pt was treated with benzodiazepines & responded favorably. Pt had atrial fibrillation with RVR which responded to metoprolol IV x3.  She was admitted to the ICU and step-down to hospital medicine today.     She complains of left upper extremity weakness started around 1 week ago.  She denies shortness of breath, chest pain, palpitations, lightheadedness, dark stools, bleeding in urine.    Overview/Hospital Course:  Pt to presented to the ED on 3/1/2024 with complaint of nausea, confusion, & body aches. In the ED, the pt had signs of active EtOH withdrawal. Last drink was night prior to admission. Per history she drinks a couple glasses per day of vodka, goes through a few bottles a week. Pt was nauseated,  but was not vomiting. Pt was treated with benzodiazepines & responded favorably. Pt had some atrial fib with RVR which responded to metoprolol IV. Now in NSR. Continuing valium taper.     Interval History: NAEON. Tele showing NSR. Pt lethargic during evaluation, but communicative and responds appropriately. States she is feeling better. Has mild tremulousness. Plan is for rehab facility in [place] once discharged.     Review of Systems   Constitutional:  Positive for fatigue. Negative for fever.   HENT:  Negative for congestion, ear pain and sore throat.    Eyes:  Negative for visual disturbance.   Respiratory:  Negative for cough, shortness of breath and wheezing.    Cardiovascular:  Negative for chest pain, palpitations and leg swelling.   Gastrointestinal:  Negative for abdominal pain, constipation, diarrhea, nausea and vomiting.   Genitourinary:  Negative for difficulty urinating, dysuria and hematuria.   Musculoskeletal:  Negative for arthralgias, back pain and joint swelling.   Skin:  Negative for rash.   Neurological:  Positive for weakness. Negative for dizziness, tremors, seizures, syncope, speech difficulty, light-headedness and headaches.   Psychiatric/Behavioral:  Negative for agitation and hallucinations. The patient is not nervous/anxious.      Objective:     Vital Signs (Most Recent):  Temp: 97.5 °F (36.4 °C) (03/05/24 1129)  Pulse: 74 (03/05/24 1522)  Resp: 16 (03/05/24 1129)  BP: 111/71 (03/05/24 1129)  SpO2: 98 % (03/05/24 1129) Vital Signs (24h Range):  Temp:  [97.5 °F (36.4 °C)-98.3 °F (36.8 °C)] 97.5 °F (36.4 °C)  Pulse:  [64-80] 74  Resp:  [16-18] 16  SpO2:  [93 %-98 %] 98 %  BP: (111-146)/(57-79) 111/71     Weight: 89.8 kg (197 lb 15.6 oz)  Body mass index is 31.95 kg/m².  No intake or output data in the 24 hours ending 03/05/24 1530        Physical Exam  Constitutional:       General: She is not in acute distress.     Appearance: She is ill-appearing.   HENT:      Head: Normocephalic and  atraumatic.      Mouth/Throat:      Mouth: Mucous membranes are moist.   Eyes:      Extraocular Movements: Extraocular movements intact.      Conjunctiva/sclera: Conjunctivae normal.      Pupils: Pupils are equal, round, and reactive to light.   Neck:      Vascular: No carotid bruit.   Cardiovascular:      Rate and Rhythm: Normal rate and regular rhythm.      Heart sounds: No murmur heard.  Pulmonary:      Effort: No respiratory distress.      Breath sounds: No wheezing or rhonchi.   Abdominal:      General: Bowel sounds are normal.      Tenderness: There is no abdominal tenderness.   Musculoskeletal:         General: No swelling or tenderness.      Cervical back: No rigidity.      Right lower leg: No edema.      Left lower leg: No edema.      Comments: Tremors of BL UE + (improved than yesterday)   Skin:     General: Skin is warm.      Capillary Refill: Capillary refill takes less than 2 seconds.      Coloration: Skin is not jaundiced.      Findings: No rash.   Neurological:      Mental Status: She is alert and oriented to person, place, and time.      Cranial Nerves: No cranial nerve deficit.      Motor: Weakness present.      Comments: Left upper extremity power 2/5  Right upper extremity, left lower extremity, right lower extremity power 5 /5   Psychiatric:         Attention and Perception: She does not perceive auditory or visual hallucinations.         Behavior: Behavior is slowed.         Thought Content: Thought content is not paranoid or delusional. Thought content does not include suicidal ideation.         Judgment: Judgment normal.             Significant Labs: All pertinent labs within the past 24 hours have been reviewed.    Significant Imaging: I have reviewed all pertinent imaging results/findings within the past 24 hours.    Assessment/Plan:      * Alcohol withdrawal  - CIWA score 10  - AST, ALT improving  - thiamine 100 mg p.o.  - folic acid 1 mg  - monitor with vitals, CIWA score q.6h  - cont  "valium taper  - start naltrexone         Subclinical hyperthyroidism  - encourage to follow with endocrinology as outpatient for more evaluation due to paroxysmal atrial fibrillation new onset  - TSH 0.203, FT4 1.12      Anemia, chronic disease  Patient's anemia is currently controlled. Has not received any PRBCs to date. 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    Weakness of left upper extremity  - recent onset of new left upper extremity weakness   - CT head reviewed  - Less likely due to any acute intracranial process             Atrial fibrillation  - Patient with Paroxysmal (<7 days) atrial fibrillation which is controlled currently with Beta Blocker. Patient is currently in sinus rhythm.[id number] Score: 4. HASBLED Score: 2   - Anticoagulation with Eliquis 5 mg BID  - CT scan Head negative for any acute intracranial process  - EKG 3/1/24 showed atrial fibrillation with RVR  - TTE showed normal function, EF 55 - 60%.   - Follow up with cardiology outpatient    Type 2 diabetes mellitus with hypoglycemia  Patient's FSGs are uncontrolled due to hypoglycemia on current medication regimen.  Last A1c reviewed-   Lab Results   Component Value Date    HGBA1C 6.0 (H) 02/11/2024     Most recent fingerstick glucose reviewed-   No results for input(s): "POCTGLUCOSE" in the last 24 hours.    Current correctional scale  None   anti-hyperglycemic dose as follows-   Due to hypoglycemic episodes hold insulin at the moment   We will monitor and restart with low-dose insulin sliding scale  Currently hypoglycemia protocol in place  Antihyperglycemics (From admission, onward)      None          Hold Oral hypoglycemics while patient is in the hospital.    Hypokalemia  Patient has hypokalemia " which is Acute and currently uncontrolled. Most recent potassium levels reviewed-   Lab Results   Component Value Date    K 4.0 03/05/2024   . Will continue potassium replacement per protocol and recheck repeat levels after replacement completed.     Depression with anxiety  Increase lexapro to 20mg daily        Hypophosphatemia  Patient has Abnormal Phosphorus: hypophosphatemia. Will continue to monitor electrolytes closely. Will replace the affected electrolytes and repeat labs to be done after interventions completed. The patient's phosphorus results have been reviewed and are listed below.  Recent Labs   Lab 03/05/24  0533   PHOS 3.5          Nausea  - pantoprazole 40 p.o. daily   - Ondansetron 4 mg q8PRN      Tobacco abuse  Assistance with smoking cessation was offered, including:  [x]  Medications  [x]  Counseling  []  Printed Information on Smoking Cessation  []  Referral to a Smoking Cessation Program    Patient was counseled regarding smoking for >10 minutes.   Nicotine patch 21 mg daily    Essential hypertension  Chronic, controlled. Latest blood pressure and vitals reviewed-     Temp:  [97.5 °F (36.4 °C)-98.3 °F (36.8 °C)]   Pulse:  [64-80]   Resp:  [16-18]   BP: (111-146)/(57-79)   SpO2:  [93 %-98 %] .   Home meds for hypertension were reviewed and noted below.   Hypertension Medications               losartan-hydrochlorothiazide 100-25 mg (HYZAAR) 100-25 mg per tablet Take 1 tablet by mouth once daily.            While in the hospital, will manage blood pressure as follows;   Due to fluctuation in blood pressure and starting of metoprolol for atrial fibrillation we will monitor  We will restart her home medicine losartan-HCTZ according to the blood pressure response    Will utilize p.r.n. blood pressure medication only if patient's blood pressure greater than 180/110 and she develops symptoms such as worsening chest pain or shortness of breath.    Alcohol use disorder, severe, dependence  Tylenol given  for headache  Encouraged to ask for Ondansetron for nausea  Encouraged to eat meals on time        VTE Risk Mitigation (From admission, onward)           Ordered     apixaban tablet 5 mg  2 times daily         03/03/24 1317     IP VTE HIGH RISK PATIENT  Once         03/02/24 0005     Place sequential compression device  Until discontinued         03/02/24 0005                    Discharge Planning   BECCA: 3/6/2024     Code Status: Full Code   Is the patient medically ready for discharge?: No    Reason for patient still in hospital (select all that apply): Treatment  Discharge Plan A: Home Health   Discharge Delays: (!) Procedure Scheduling (IR, OR, Labs, Echo, Cath, Echo, EEG)              Kvng Hargrove MD  Department of Hospital Medicine   [facility] - Stepdown Flex ([place]-14)

## 2024-03-05 NOTE — NURSING
Patient A&OX4 overnight CIWA 15 overnight ativan given, complaints of nausea. Patient was anxious and restless most of the night, relaxation techniques reviewed with patient. VSS. Bed in low position, bed locked, bed alarm on, call light and personal belongings within reach.

## 2024-03-05 NOTE — PT/OT/SLP EVAL
"Occupational Therapy   Evaluation    Name: Earl Abdul  MRN: [id number]  Admitting Diagnosis: Alcohol withdrawal  Recent Surgery: * No surgery found *      Recommendations:     Discharge Recommendations: Low Intensity Therapy  Discharge Equipment Recommendations:  none  Barriers to discharge:   Inaccessible home    Assessment:     Earl Abdul is a 63 y.o. female with a medical diagnosis of Alcohol withdrawal.  She presents with the following performance deficits affecting function: weakness, impaired endurance, impaired functional mobility, gait instability, impaired self care skills, impaired balance, decreased ROM, impaired coordination, impaired cardiopulmonary response to activity.      Rehab Prognosis: Good; patient would benefit from acute skilled OT services to address these deficits and reach maximum level of function.       Plan:     Patient to be seen   to address the above listed problems via self-care/home management, therapeutic activities, therapeutic exercises, neuromuscular re-education  Plan of Care Expires:    Plan of Care Reviewed with: patient    Subjective     Chief Complaint: "I'm fine."  Patient/Family Comments/goals: Return to bed    Occupational Profile:  Living Environment: Pt and spouse live in multi-level, 8STE with B HR, t/s combo with flight of steps and R HR  Previous level of function: Independent  Roles and Routines: None stated  Equipment Used at Home: cane, straight  Assistance upon Discharge: Spouse    Pain/Comfort:  Pain Rating 1: 0/10  Pain Rating Post-Intervention 1: 0/10    Patients cultural, spiritual, Taoist conflicts given the current situation: no    Objective:     Communicated with: Nursing prior to session.  Patient found HOB elevated with oxygen upon OT entry to room.    General Precautions: Standard, fall  Orthopedic Precautions: N/A  Braces: N/A  Respiratory Status: Room air    Occupational Performance:    Bed Mobility:    Patient completed Supine to Sit " with supervision  Patient completed Sit to Supine with supervision    Functional Mobility/Transfers:  Patient completed Sit <> Stand Transfer with stand by assistance  with  no assistive device   Functional Mobility: SBA with no AD    Activities of Daily Living:  Grooming: stand by assistance seated edge of bed  Lower Body Dressing: minimum assistance seated edge of bed    Cognitive/Visual Perceptual:  Cognitive/Psychosocial Skills:     -       Oriented to: Person, Place, and Time   -       Follows Commands/attention:Easily distracted and Follows one-step commands  -       Safety awareness/insight to disability: impaired   -       Mood/Affect/Coping skills/emotional control: Appropriate to situation    Physical Exam:  Postural examination/scapula alignment:    -       No postural abnormalities identified  Upper Extremity Range of Motion:     -       Right Upper Extremity: WFL  -       Left Upper Extremity: WFL  Upper Extremity Strength:    -       Right Upper Extremity: WFL  -       Left Upper Extremity: WNL   Strength:    -       Right Upper Extremity: WFL  -       Left Upper Extremity: WFL    AMPAC 6 Click ADL:  AMPAC Total Score: 17    Treatment & Education:  -Evaluation completed, plan of care established.  -Patient and family educated on roles/goals of OT and POC.  -White board updated.  -Therapist provided time for questions and answered within scope of practice.  -Patient educated on importance of EOB/OOB activity to maximize recovery.    Patient left HOB elevated with all lines intact    GOALS:   Multidisciplinary Problems       Occupational Therapy Goals          Problem: Occupational Therapy    Goal Priority Disciplines Outcome Interventions   Occupational Therapy Goal     OT, PT/OT Ongoing, Progressing    Description: Goals to be met by: 4/5/24     Patient will increase functional independence with ADLs by performing:    UE Dressing with Modified [place].  LE Dressing with Modified  [place].  Grooming while standing at sink with Modified [place].  Toileting from toilet with Modified [place] for hygiene and clothing management.   Supine to sit with Modified [place].  Step transfer with Modified [place]  Toilet transfer to toilet with Modified [place].                         History:     Past Medical History:   Diagnosis Date    Alcoholism     c/b alcohol withdrawl seizures 7/2017    Anemia     Cancer of breast 10/2020    s/p bilateral mastectomy for  T1b N0 stage IA breast cancer October 2020    CLL (chronic lymphocytic leukemia) 09/30/2022 6/22/22 - PB flow cytometry  Immunophenotyping of peripheral blood detects a distinct kappa light chain restricted monoclonal B-cell population  (calculated at 2.44x10 9 /L, from the most recent CBC showing a total WBC of  7.35 K/uL with 61% total lymphocytes)  with a CLL phenotype (coexpression of CD19, CD5, CD23 and dim CD20). CD22 (FITC), FMC-7 and CD38 are negative in this population.    Controlled type 2 diabetes mellitus without complication, without long-term current use of insulin 11/30/2021    COPD (chronic obstructive pulmonary disease)     Depression     Diverticulitis     Fatty liver     GERD (gastroesophageal reflux disease)     Hyperlipidemia     Hypertension     Pancreatitis     Peptic ulcer disease     Polysubstance abuse     Posterior reversible encephalopathy syndrome     Sarcoidosis of lung     over 30 yrs ago    Suicide attempt          Past Surgical History:   Procedure Laterality Date    APPENDECTOMY      BILATERAL MASTECTOMY Bilateral 10/29/2020    Procedure: MASTECTOMY, BILATERAL;  Surgeon: Baylee Kevin MD;  Location: [facility] OR [address] [place] [address];  Service: General;  Laterality: Bilateral;    BREAST REVISION SURGERY Bilateral 2/11/2021    Procedure: BREAST REVISION SURGERY;  Surgeon: Scottie Johnson MD;  Location: [facility] OR [address] [place] [address];  Service: Plastics;  Laterality: Bilateral;    COLONOSCOPY N/A 7/28/2017     Procedure: COLONOSCOPY;  Surgeon: Aaron Alvarado MD;  Location: [facility];  Service: Endoscopy;  Laterality: N/A;    ESOPHAGOGASTRODUODENOSCOPY  10/7/2016, 11/6/2014    2016 - gastritis, duodenitis, 2014 erosive gastritis    ESOPHAGOGASTRODUODENOSCOPY N/A 2/11/2020    Procedure: ESOPHAGOGASTRODUODENOSCOPY (EGD);  Surgeon: Fawn Garrido MD;  Location: [facility] ENDO;  Service: Endoscopy;  Laterality: N/A;    ESOPHAGOGASTRODUODENOSCOPY N/A 4/19/2021    Procedure: EGD (ESOPHAGOGASTRODUODENOSCOPY);  Surgeon: Paramjit Martino MD;  Location: [facility] ENDO;  Service: Endoscopy;  Laterality: N/A;    FLEXIBLE SIGMOIDOSCOPY  11/06/2014    colitis    HYSTERECTOMY      IMPLANTATION OF PERMANENT SACRAL NERVE STIMULATOR N/A 7/12/2022    Procedure: INSERTION, NEUROSTIMULATOR, PERMANENT, SACRAL;  Surgeon: Juaquin Edwards MD;  Location: [facility] OR [address] [place] [address];  Service: Urology;  Laterality: N/A;  1hr    INJECTION FOR SENTINEL NODE IDENTIFICATION Right 10/29/2020    Procedure: INJECTION, FOR SENTINEL NODE IDENTIFICATION;  Surgeon: Baylee Kevin MD;  Location: [facility] OR [address] [place] [address];  Service: General;  Laterality: Right;    INJECTION OF JOINT Right 10/10/2019    Procedure: Injection, Joint RIGHT ILIOPSOAS BURSA/TENDON INJECTION AND RIGHT GLUTEAL TENDON INJECTION WITH STEROID AND LIDOCAINE;  Surgeon: Guillaume Rico MD;  Location: [facility] MGT;  Service: Pain Management;  Laterality: Right;  NEEDS CONSENT    INSERTION OF BREAST TISSUE EXPANDER Bilateral 10/29/2020    Procedure: INSERTION, TISSUE EXPANDER, BREAST;  Surgeon: Scottie Johnson MD;  Location: [facility] OR [address] [place] [address];  Service: Plastics;  Laterality: Bilateral;  Right breast: 1082 g  Left breast: 1076 g    LIPOSUCTION Bilateral 2/11/2021    Procedure: LIPOSUCTION;  Surgeon: Scottie Johnson MD;  [facility];  Service: Plastics;  Laterality: Bilateral;    mediastenoscopy      REPLACEMENT OF IMPLANT OF BREAST Bilateral 2/11/2021    Procedure: REPLACEMENT, IMPLANT,  BREAST;  Surgeon: Scottie Johnson MD;  Location: [facility] OR [address] [place] [address];  Service: Plastics;  Laterality: Bilateral;    SENTINEL LYMPH NODE BIOPSY Right 10/29/2020    Procedure: BIOPSY, LYMPH NODE, SENTINEL;  Surgeon: Baylee Kevin MD;  Location: [facility] OR [address] [place] [address];  Service: General;  Laterality: Right;    TONSILLECTOMY N/A 1970    TUBAL LIGATION         Time Tracking:     OT Date of Treatment: 03/05/24  OT Start Time: 1418  OT Stop Time: 1428  OT Total Time (min): 10 min    Billable Minutes:Evaluation 1 unit    3/5/2024

## 2024-03-05 NOTE — PLAN OF CARE
Evaluation completed, plan of care established.     Problem: Occupational Therapy  Goal: Occupational Therapy Goal  Description: Goals to be met by: 4/5/24     Patient will increase functional independence with ADLs by performing:    UE Dressing with Modified [place].  LE Dressing with Modified [place].  Grooming while standing at sink with Modified [place].  Toileting from toilet with Modified [place] for hygiene and clothing management.   Supine to sit with Modified [place].  Step transfer with Modified [place]  Toilet transfer to toilet with Modified [place].    Outcome: Ongoing, Progressing

## 2024-03-05 NOTE — PLAN OF CARE
PT Evaluation completed and PT POC established.    Problem: Physical Therapy  Goal: Physical Therapy Goal  Description: Goals to be met by: 2024     Patient will increase functional independence with mobility by performin. Supine to sit with Modified [place]  2. Sit to supine with Modified [place]  3. Sit to stand transfer with Supervision  4. Bed to chair transfer with Supervision using LRAD  5. Gait  x 100 feet with Supervision using LRAD.   6. Ascend/descend 10 stair with right Handrails Contact Guard Assistance using LRAD.     Outcome: Ongoing, Progressing

## 2024-03-05 NOTE — CONSULTS
[facility] ([place] [id number])  Wound Care    Patient Name:  Earl Abdul   MRN:  [id number]  Date: 3/5/2024  Diagnosis: Alcohol withdrawal    History:     Past Medical History:   Diagnosis Date    Alcoholism     c/b alcohol withdrawl seizures 7/2017    Anemia     Cancer of breast 10/2020    s/p bilateral mastectomy for  T1b N0 stage IA breast cancer October 2020    CLL (chronic lymphocytic leukemia) 09/30/2022 6/22/22 - PB flow cytometry  Immunophenotyping of peripheral blood detects a distinct kappa light chain restricted monoclonal B-cell population  (calculated at 2.44x10 9 /L, from the most recent CBC showing a total WBC of  7.35 K/uL with 61% total lymphocytes)  with a CLL phenotype (coexpression of CD19, CD5, CD23 and dim CD20). CD22 (FITC), FMC-7 and CD38 are negative in this population.    Controlled type 2 diabetes mellitus without complication, without long-term current use of insulin 11/30/2021    COPD (chronic obstructive pulmonary disease)     Depression     Diverticulitis     Fatty liver     GERD (gastroesophageal reflux disease)     Hyperlipidemia     Hypertension     Pancreatitis     Peptic ulcer disease     Polysubstance abuse     Posterior reversible encephalopathy syndrome     Sarcoidosis of lung     over 30 yrs ago    Suicide attempt        Social History     Socioeconomic History    Marital status:    Tobacco Use    Smoking status: Every Day     Current packs/day: 0.00     Average packs/day: 0.5 packs/day for 30.0 years (15.0 ttl pk-yrs)     Types: Vaping with nicotine, Cigarettes     Start date: 2/1/1991     Last attempt to quit: 2/1/2021     Years since quitting: 3.0    Smokeless tobacco: Never    Tobacco comments:     Patient is currently smoking 10 cigarettes a day, declines nicotine patches   Substance and Sexual Activity    Alcohol use: Yes     Comment: vodka daily (half a regular bottle) for 4 days    Drug use: Yes     Types: Marijuana     Comment: gummies    Sexual  activity: Yes     Birth control/protection: Surgical     Social Determinants of Health     Financial Resource Strain: Low Risk  (3/3/2024)    Overall Financial Resource Strain (CARDIA)     Difficulty of Paying Living Expenses: Not hard at all   Food Insecurity: No Food Insecurity (3/3/2024)    Hunger Vital Sign     Worried About Running Out of Food in the Last Year: Never true     Ran Out of Food in the Last Year: Never true   Transportation Needs: No Transportation Needs (3/3/2024)    PRAPARE - Transportation     Lack of Transportation (Medical): No     Lack of Transportation (Non-Medical): No   Physical Activity: Sufficiently Active (2/12/2024)    Exercise Vital Sign     Days of Exercise per Week: 7 days     Minutes of Exercise per Session: 40 min   Recent Concern: Physical Activity - Inactive (1/30/2024)    Exercise Vital Sign     Days of Exercise per Week: 0 days     Minutes of Exercise per Session: 0 min   Stress: Stress Concern Present (3/3/2024)    [employer] - Occupational Stress Questionnaire     Feeling of Stress : To some extent   Social Connections: Unknown (2/12/2024)    Social Connection and Isolation Panel [NHANES]     Frequency of Communication with Friends and Family: More than three times a week     Frequency of Social Gatherings with Friends and Family: Once a week     Active Member of Clubs or Organizations: No     Attends Club or Organization Meetings: Patient declined     Marital Status:    Housing Stability: Low Risk  (3/3/2024)    Housing Stability Vital Sign     Unable to Pay for Housing in the Last Year: No     Number of Places Lived in the Last Year: 1     Unstable Housing in the Last Year: No       Precautions:     Allergies as of 03/01/2024 - Reviewed 03/01/2024   Allergen Reaction Noted    Lortab [hydrocodone-acetaminophen] Itching 11/15/2012    Promethazine Itching and Other (See Comments) 07/08/2012       [facility] Assessment Details/Treatment      Patient seen for wound care consultation.   Reviewed chart for this encounter.   See Flow Sheet for findings.      RECOMMENDATIONS: Patient Aox4 and agreeable to inpatient wound care. Left wrist wound from preadmission fall. Cleanse with vashe for antimicrobial properties and pat dry. Apply mepilex foam border dressing. Change every three days or PRN if soiled. No other issues or concerns at this time. Will follow up 3/12/2024 or sooner if needed.    Discussed POC with patient and primary nurse.   See EMR for orders & patient education.    Discussed nutrition and the role of protein in wound healing with the patient. Instructed patient to optimize protein for wound healing.    Bedside nursing to continue care & monitoring.  Bedside nursing to maintain pressure injury prevention interventions.            03/05/24 0925   WOCN Assessment   WOCN Total Time (mins) 30   Visit Date 03/05/24   Visit Time 0925   Consult Type New   WOCN Speciality Wound   Intervention assessed;changed;applied;chart review;orders   Teaching on-going        Altered Skin Integrity 02/16/24 0855 Left distal;lower;posterior Arm Laceration   Date First Assessed/Time First Assessed: 02/16/24 0855   Altered Skin Integrity Present on Admission - Did Patient arrive to the hospital with altered skin?: yes  Side: Left  Orientation: distal;lower;posterior  Location: Arm  Primary Wound Type: Lace...   Wound Image    Dressing Appearance Open to air;Dry;Intact;Clean   Drainage Amount None   Drainage Characteristics/Odor No odor   Appearance Pink;Red   Care Cleansed with:;Antimicrobial agent   Dressing Applied;Foam   Dressing Change Due 03/08/24       Orders placed.   Kevin Calvo RN  03/05/2024

## 2024-03-05 NOTE — ASSESSMENT & PLAN NOTE
Patient has Abnormal Phosphorus: hypophosphatemia. Will continue to monitor electrolytes closely. Will replace the affected electrolytes and repeat labs to be done after interventions completed. The patient's phosphorus results have been reviewed and are listed below.  Recent Labs   Lab 03/05/24  0533   PHOS 3.5

## 2024-03-05 NOTE — PLAN OF CARE
CM spoke with the patient and patient spouse and the patient has been accepted into [facility] O: [phone] F: [phone] Treatment for psychological -and substance treatment      Nusrat Gaxiola RN  Case Management  [facility]  [phone]

## 2024-03-05 NOTE — PT/OT/SLP EVAL
"Physical Therapy Co-Evaluation    Patient Name:  Earl Abdul   MRN:  [id number]    Recommendations:     Discharge Recommendations: Low Intensity Therapy   Discharge Equipment Recommendations: none   Barriers to discharge: Inaccessible home    Assessment:     Earl Abdul is a 63 y.o. female admitted with a medical diagnosis of Alcohol withdrawal.  She presents with the following impairments/functional limitations: weakness, impaired endurance, gait instability, impaired balance, impaired functional mobility.    Alert and cooperative. Pt amb 40' no AD with 1 person for safety and fair standing balance. Pt politely declined going out into hallway;  will attempt stair training future session. Pt will benefit from skilled PT services while in house in order to address the aforementioned deficits.      Rehab Prognosis: Good; patient would benefit from acute skilled PT services to address these deficits and reach maximum level of function.    Recent Surgery: * No surgery found *      Plan:     During this hospitalization, patient to be seen 4 x/week to address the identified rehab impairments via gait training, therapeutic activities, therapeutic exercises, neuromuscular re-education and progress toward the following goals:    Plan of Care Expires:  04/05/24    Subjective     "I dont like socks"    Pain/Comfort:  Pain Rating 1: 0/10    Patients cultural, spiritual, Jew conflicts given the current situation: no    Living Environment:  Per pt, pt and spouse reside in [facility] with [address]. Pt's bedroom and tub/shower combo located on second floor with 10 steps R HR.   Prior to admission, patients level of function was I.  Equipment used at home: cane, straight.  DME owned (not currently used): single point cane.  Upon discharge, patient will have assistance from spouse and adult son whos 15 min away.    Objective:     Communicated with RN prior to session.  Patient found HOB elevated with oxygen  upon PT entry " to room.    General Precautions: Standard, fall  Orthopedic Precautions:N/A   Braces: N/A  Respiratory Status: Nasal cannula, flow 3 L/min    Exams:  RLE ROM: WFL  RLE Strength: WFL  LLE ROM: WFL  LLE Strength: WFL    Functional Mobility:  Bed Mobility:     Supine to Sit: supervision  Sit to Supine: supervision  Transfers:     Sit to Stand:  stand by assistance with no AD  Gait: pt amb 40' no AD SBA and presented with WBOS, fair johan, arm swing present, no LOB  Balance:    Good sitting balance  Fair-good standing balance      AM-PAC 6 CLICK MOBILITY  Total Score:20       Treatment & Education:  Discussed sitting up EOB and/or UIC with RN/staff outside of therapy services  Discussed amb to restroom with RN/staff outside of therapy services    Educated pt on PT role/POC  Educated pt on importance of OOB activity and daily ambulation   Pt educated on proper body mechanics, safety techniques, and energy conservation with PT facilitation and cueing throughout session   Pt verbalized understanding      Patient left HOB elevated with all lines intact, call button in reach, RN notified, and OT present.    GOALS:   Multidisciplinary Problems       Physical Therapy Goals          Problem: Physical Therapy    Goal Priority Disciplines Outcome Goal Variances Interventions   Physical Therapy Goal     PT, PT/OT Ongoing, Progressing     Description: Goals to be met by: 2024     Patient will increase functional independence with mobility by performin. Supine to sit with Modified [place]  2. Sit to supine with Modified [place]  3. Sit to stand transfer with Supervision  4. Bed to chair transfer with Supervision using LRAD  5. Gait  x 100 feet with Supervision using LRAD.   6. Ascend/descend 10 stair with right Handrails Contact Guard Assistance using LRAD.                          History:     Past Medical History:   Diagnosis Date    Alcoholism     c/b alcohol withdrawl seizures 2017    Anemia     Cancer  of breast 10/2020    s/p bilateral mastectomy for  T1b N0 stage IA breast cancer October 2020    CLL (chronic lymphocytic leukemia) 09/30/2022 6/22/22 - PB flow cytometry  Immunophenotyping of peripheral blood detects a distinct kappa light chain restricted monoclonal B-cell population  (calculated at 2.44x10 9 /L, from the most recent CBC showing a total WBC of  7.35 K/uL with 61% total lymphocytes)  with a CLL phenotype (coexpression of CD19, CD5, CD23 and dim CD20). CD22 (FITC), FMC-7 and CD38 are negative in this population.    Controlled type 2 diabetes mellitus without complication, without long-term current use of insulin 11/30/2021    COPD (chronic obstructive pulmonary disease)     Depression     Diverticulitis     Fatty liver     GERD (gastroesophageal reflux disease)     Hyperlipidemia     Hypertension     Pancreatitis     Peptic ulcer disease     Polysubstance abuse     Posterior reversible encephalopathy syndrome     Sarcoidosis of lung     over 30 yrs ago    Suicide attempt        Past Surgical History:   Procedure Laterality Date    APPENDECTOMY      BILATERAL MASTECTOMY Bilateral 10/29/2020    Procedure: MASTECTOMY, BILATERAL;  Surgeon: Baylee Kevin MD;  Location: [facility] OR [address] [place] [address];  Service: General;  Laterality: Bilateral;    BREAST REVISION SURGERY Bilateral 2/11/2021    Procedure: BREAST REVISION SURGERY;  Surgeon: Scottie Johnson MD;  Location: [facility] OR [address] [place] [address];  Service: Plastics;  Laterality: Bilateral;    COLONOSCOPY N/A 7/28/2017    Procedure: COLONOSCOPY;  Surgeon: Aaron Alvarado MD;  Location: [facility];  Service: Endoscopy;  Laterality: N/A;    ESOPHAGOGASTRODUODENOSCOPY  10/7/2016, 11/6/2014    2016 - gastritis, duodenitis, 2014 erosive gastritis    ESOPHAGOGASTRODUODENOSCOPY N/A 2/11/2020    Procedure: ESOPHAGOGASTRODUODENOSCOPY (EGD);  Surgeon: Fawn Garrido MD;  Location: [facility];  Service: Endoscopy;  Laterality: N/A;    ESOPHAGOGASTRODUODENOSCOPY N/A 4/19/2021     Procedure: EGD (ESOPHAGOGASTRODUODENOSCOPY);  Surgeon: Paramjit Martino MD;  Location: [facility] ENDO;  Service: Endoscopy;  Laterality: N/A;    FLEXIBLE SIGMOIDOSCOPY  11/06/2014    colitis    HYSTERECTOMY      IMPLANTATION OF PERMANENT SACRAL NERVE STIMULATOR N/A 7/12/2022    Procedure: INSERTION, NEUROSTIMULATOR, PERMANENT, SACRAL;  Surgeon: Juaquin Edwards MD;  Location: [facility] OR [address] [place] [address];  Service: Urology;  Laterality: N/A;  1hr    INJECTION FOR SENTINEL NODE IDENTIFICATION Right 10/29/2020    Procedure: INJECTION, FOR SENTINEL NODE IDENTIFICATION;  Surgeon: Baylee Kevin MD;  Location: [address];  Service: General;  Laterality: Right;    INJECTION OF JOINT Right 10/10/2019    Procedure: Injection, Joint RIGHT ILIOPSOAS BURSA/TENDON INJECTION AND RIGHT GLUTEAL TENDON INJECTION WITH STEROID AND LIDOCAINE;  Surgeon: Guillaume Rico MD;  Location: [facility] PAIN MGT;  Service: Pain Management;  Laterality: Right;  NEEDS CONSENT    INSERTION OF BREAST TISSUE EXPANDER Bilateral 10/29/2020    Procedure: INSERTION, TISSUE EXPANDER, BREAST;  Surgeon: Scottie Johnson MD;  Location: [address];  Service: Plastics;  Laterality: Bilateral;  Right breast: 1082 g  Left breast: 1076 g    LIPOSUCTION Bilateral 2/11/2021    Procedure: LIPOSUCTION;  Surgeon: Scottie Johnson MD;  Location: [address];  Service: Plastics;  Laterality: Bilateral;    mediastenoscopy      REPLACEMENT OF IMPLANT OF BREAST Bilateral 2/11/2021    Procedure: REPLACEMENT, IMPLANT, BREAST;  Surgeon: Scottie Johnson MD;  Location: [address];  Service: Plastics;  Laterality: Bilateral;    SENTINEL LYMPH NODE BIOPSY Right 10/29/2020    Procedure: BIOPSY, LYMPH NODE, SENTINEL;  Surgeon: Baylee Kevin MD;  Location: [address];  Service: General;  Laterality: Right;    TONSILLECTOMY N/A 1970    TUBAL LIGATION         Time Tracking:     PT Received On: 03/05/24  PT Start Time: 1418     PT Stop Time: 1428  PT Total  Time (min): 10 min     Billable Minutes: Evaluation 10    Co-treatment performed due to patient's multiple deficits requiring two skilled therapists to appropriately and safely assess patient's strength and endurance while facilitating functional tasks in addition to accommodating for patient's activity tolerance.       03/05/2024

## 2024-03-05 NOTE — SUBJECTIVE & OBJECTIVE
Interval History: NAEON. Tele showing NSR. Pt lethargic during evaluation, but communicative and responds appropriately. States she is feeling better. Has mild tremulousness. Plan is for rehab facility in [place] once discharged.     Review of Systems   Constitutional:  Positive for fatigue. Negative for fever.   HENT:  Negative for congestion, ear pain and sore throat.    Eyes:  Negative for visual disturbance.   Respiratory:  Negative for cough, shortness of breath and wheezing.    Cardiovascular:  Negative for chest pain, palpitations and leg swelling.   Gastrointestinal:  Negative for abdominal pain, constipation, diarrhea, nausea and vomiting.   Genitourinary:  Negative for difficulty urinating, dysuria and hematuria.   Musculoskeletal:  Negative for arthralgias, back pain and joint swelling.   Skin:  Negative for rash.   Neurological:  Positive for weakness. Negative for dizziness, tremors, seizures, syncope, speech difficulty, light-headedness and headaches.   Psychiatric/Behavioral:  Negative for agitation and hallucinations. The patient is not nervous/anxious.      Objective:     Vital Signs (Most Recent):  Temp: 97.5 °F (36.4 °C) (03/05/24 1129)  Pulse: 74 (03/05/24 1522)  Resp: 16 (03/05/24 1129)  BP: 111/71 (03/05/24 1129)  SpO2: 98 % (03/05/24 1129) Vital Signs (24h Range):  Temp:  [97.5 °F (36.4 °C)-98.3 °F (36.8 °C)] 97.5 °F (36.4 °C)  Pulse:  [64-80] 74  Resp:  [16-18] 16  SpO2:  [93 %-98 %] 98 %  BP: (111-146)/(57-79) 111/71     Weight: 89.8 kg (197 lb 15.6 oz)  Body mass index is 31.95 kg/m².  No intake or output data in the 24 hours ending 03/05/24 1530        Physical Exam  Constitutional:       General: She is not in acute distress.     Appearance: She is ill-appearing.   HENT:      Head: Normocephalic and atraumatic.      Mouth/Throat:      Mouth: Mucous membranes are moist.   Eyes:      Extraocular Movements: Extraocular movements intact.      Conjunctiva/sclera: Conjunctivae normal.       Pupils: Pupils are equal, round, and reactive to light.   Neck:      Vascular: No carotid bruit.   Cardiovascular:      Rate and Rhythm: Normal rate and regular rhythm.      Heart sounds: No murmur heard.  Pulmonary:      Effort: No respiratory distress.      Breath sounds: No wheezing or rhonchi.   Abdominal:      General: Bowel sounds are normal.      Tenderness: There is no abdominal tenderness.   Musculoskeletal:         General: No swelling or tenderness.      Cervical back: No rigidity.      Right lower leg: No edema.      Left lower leg: No edema.      Comments: Tremors of BL UE + (improved than yesterday)   Skin:     General: Skin is warm.      Capillary Refill: Capillary refill takes less than 2 seconds.      Coloration: Skin is not jaundiced.      Findings: No rash.   Neurological:      Mental Status: She is alert and oriented to person, place, and time.      Cranial Nerves: No cranial nerve deficit.      Motor: Weakness present.      Comments: Left upper extremity power 2/5  Right upper extremity, left lower extremity, right lower extremity power 5 /5   Psychiatric:         Attention and Perception: She does not perceive auditory or visual hallucinations.         Behavior: Behavior is slowed.         Thought Content: Thought content is not paranoid or delusional. Thought content does not include suicidal ideation.         Judgment: Judgment normal.             Significant Labs: All pertinent labs within the past 24 hours have been reviewed.    Significant Imaging: I have reviewed all pertinent imaging results/findings within the past 24 hours.

## 2024-03-05 NOTE — ASSESSMENT & PLAN NOTE
- CIWA score 10  - AST, ALT improving  - thiamine 100 mg p.o.  - folic acid 1 mg  - monitor with vitals, CIWA score q.6h  - cont valium taper  - start naltrexone

## 2024-03-05 NOTE — NURSING
Patient alert and oriented x 4.  Vitals stable. Complaint of nausea - PRN medication given. Complaint of depression and anxiety - PRN medication ordered and given. Q4 CIWA . Spouse at bedside. Safety measures in place. Call light in reach.

## 2024-03-06 VITALS
HEART RATE: 70 BPM | OXYGEN SATURATION: 94 % | WEIGHT: 198 LBS | RESPIRATION RATE: 18 BRPM | TEMPERATURE: 97 F | HEIGHT: 66 IN | DIASTOLIC BLOOD PRESSURE: 65 MMHG | SYSTOLIC BLOOD PRESSURE: 120 MMHG | BODY MASS INDEX: 31.82 KG/M2

## 2024-03-06 LAB
ALBUMIN SERPL BCP-MCNC: 3.1 G/DL (ref 3.5–5.2)
ALP SERPL-CCNC: 51 U/L (ref 55–135)
ALT SERPL W/O P-5'-P-CCNC: 41 U/L (ref 10–44)
ANION GAP SERPL CALC-SCNC: 10 MMOL/L (ref 8–16)
ANISOCYTOSIS BLD QL SMEAR: SLIGHT
AST SERPL-CCNC: 39 U/L (ref 10–40)
BACTERIA BLD CULT: NORMAL
BACTERIA BLD CULT: NORMAL
BASOPHILS # BLD AUTO: 0.03 K/UL (ref 0–0.2)
BASOPHILS NFR BLD: 0.6 % (ref 0–1.9)
BILIRUB SERPL-MCNC: 0.2 MG/DL (ref 0.1–1)
BUN SERPL-MCNC: 10 MG/DL (ref 8–23)
CALCIUM SERPL-MCNC: 8.5 MG/DL (ref 8.7–10.5)
CHLORIDE SERPL-SCNC: 103 MMOL/L (ref 95–110)
CO2 SERPL-SCNC: 25 MMOL/L (ref 23–29)
CREAT SERPL-MCNC: 0.8 MG/DL (ref 0.5–1.4)
DIFFERENTIAL METHOD BLD: ABNORMAL
EOSINOPHIL # BLD AUTO: 0.1 K/UL (ref 0–0.5)
EOSINOPHIL NFR BLD: 1.3 % (ref 0–8)
ERYTHROCYTE [DISTWIDTH] IN BLOOD BY AUTOMATED COUNT: 18.9 % (ref 11.5–14.5)
EST. GFR  (NO RACE VARIABLE): >60 ML/MIN/1.73 M^2
GLUCOSE SERPL-MCNC: 263 MG/DL (ref 70–110)
HCT VFR BLD AUTO: 31.2 % (ref 37–48.5)
HGB BLD-MCNC: 9.6 G/DL (ref 12–16)
HYPOCHROMIA BLD QL SMEAR: ABNORMAL
IMM GRANULOCYTES # BLD AUTO: 0.04 K/UL (ref 0–0.04)
IMM GRANULOCYTES NFR BLD AUTO: 0.8 % (ref 0–0.5)
LYMPHOCYTES # BLD AUTO: 3.3 K/UL (ref 1–4.8)
LYMPHOCYTES NFR BLD: 68.9 % (ref 18–48)
MAGNESIUM SERPL-MCNC: 1.6 MG/DL (ref 1.6–2.6)
MCH RBC QN AUTO: 27.1 PG (ref 27–31)
MCHC RBC AUTO-ENTMCNC: 30.8 G/DL (ref 32–36)
MCV RBC AUTO: 88 FL (ref 82–98)
MONOCYTES # BLD AUTO: 0.5 K/UL (ref 0.3–1)
MONOCYTES NFR BLD: 10.2 % (ref 4–15)
NEUTROPHILS # BLD AUTO: 0.9 K/UL (ref 1.8–7.7)
NEUTROPHILS NFR BLD: 18.2 % (ref 38–73)
NRBC BLD-RTO: 1 /100 WBC
OVALOCYTES BLD QL SMEAR: ABNORMAL
PHOSPHATE SERPL-MCNC: 3.2 MG/DL (ref 2.7–4.5)
PLATELET # BLD AUTO: 71 K/UL (ref 150–450)
PMV BLD AUTO: ABNORMAL FL (ref 9.2–12.9)
POIKILOCYTOSIS BLD QL SMEAR: SLIGHT
POLYCHROMASIA BLD QL SMEAR: ABNORMAL
POTASSIUM SERPL-SCNC: 4.3 MMOL/L (ref 3.5–5.1)
PROT SERPL-MCNC: 5.5 G/DL (ref 6–8.4)
RBC # BLD AUTO: 3.54 M/UL (ref 4–5.4)
SODIUM SERPL-SCNC: 138 MMOL/L (ref 136–145)
SPHEROCYTES BLD QL SMEAR: ABNORMAL
WBC # BLD AUTO: 4.79 K/UL (ref 3.9–12.7)

## 2024-03-06 PROCEDURE — 85025 COMPLETE CBC W/AUTO DIFF WBC: CPT

## 2024-03-06 PROCEDURE — 27000221 HC OXYGEN, UP TO 24 HOURS

## 2024-03-06 PROCEDURE — 94799 UNLISTED PULMONARY SVC/PX: CPT | Mod: XB

## 2024-03-06 PROCEDURE — 36415 COLL VENOUS BLD VENIPUNCTURE: CPT

## 2024-03-06 PROCEDURE — 80053 COMPREHEN METABOLIC PANEL: CPT

## 2024-03-06 PROCEDURE — 25000003 PHARM REV CODE 250: Performed by: STUDENT IN AN ORGANIZED HEALTH CARE EDUCATION/TRAINING PROGRAM

## 2024-03-06 PROCEDURE — 97535 SELF CARE MNGMENT TRAINING: CPT

## 2024-03-06 PROCEDURE — 84100 ASSAY OF PHOSPHORUS: CPT

## 2024-03-06 PROCEDURE — 99900035 HC TECH TIME PER 15 MIN (STAT)

## 2024-03-06 PROCEDURE — S4991 NICOTINE PATCH NONLEGEND: HCPCS | Performed by: STUDENT IN AN ORGANIZED HEALTH CARE EDUCATION/TRAINING PROGRAM

## 2024-03-06 PROCEDURE — 94761 N-INVAS EAR/PLS OXIMETRY MLT: CPT

## 2024-03-06 PROCEDURE — 83735 ASSAY OF MAGNESIUM: CPT

## 2024-03-06 PROCEDURE — 25000003 PHARM REV CODE 250

## 2024-03-06 PROCEDURE — 97116 GAIT TRAINING THERAPY: CPT

## 2024-03-06 PROCEDURE — 25000003 PHARM REV CODE 250: Performed by: HOSPITALIST

## 2024-03-06 PROCEDURE — 25000003 PHARM REV CODE 250: Performed by: INTERNAL MEDICINE

## 2024-03-06 RX ORDER — LANOLIN ALCOHOL/MO/W.PET/CERES
100 CREAM (GRAM) TOPICAL DAILY
Qty: 30 TABLET | Refills: 0 | Status: SHIPPED | OUTPATIENT
Start: 2024-03-06 | End: 2024-04-05

## 2024-03-06 RX ORDER — ESCITALOPRAM OXALATE 20 MG/1
20 TABLET ORAL DAILY
Qty: 30 TABLET | Refills: 0 | Status: ON HOLD | OUTPATIENT
Start: 2024-03-07 | End: 2024-06-09

## 2024-03-06 RX ORDER — METOPROLOL SUCCINATE 50 MG/1
50 TABLET, EXTENDED RELEASE ORAL DAILY
Qty: 30 TABLET | Refills: 0 | Status: SHIPPED | OUTPATIENT
Start: 2024-03-07 | End: 2025-03-07

## 2024-03-06 RX ORDER — GABAPENTIN 300 MG/1
300 CAPSULE ORAL 3 TIMES DAILY
Qty: 90 CAPSULE | Refills: 0 | Status: SHIPPED | OUTPATIENT
Start: 2024-03-06 | End: 2024-05-17

## 2024-03-06 RX ORDER — IBUPROFEN 200 MG
1 TABLET ORAL DAILY
Qty: 28 PATCH | Refills: 0 | Status: SHIPPED | OUTPATIENT
Start: 2024-03-07 | End: 2024-05-17

## 2024-03-06 RX ORDER — NALTREXONE HYDROCHLORIDE 50 MG/1
50 TABLET, FILM COATED ORAL DAILY
Qty: 30 TABLET | Refills: 0 | Status: SHIPPED | OUTPATIENT
Start: 2024-03-06 | End: 2024-05-17

## 2024-03-06 RX ORDER — FOLIC ACID 1 MG/1
1 TABLET ORAL DAILY
Qty: 30 TABLET | Refills: 0 | Status: SHIPPED | OUTPATIENT
Start: 2024-03-06 | End: 2024-05-17

## 2024-03-06 RX ORDER — OMEPRAZOLE 20 MG/1
20 CAPSULE, DELAYED RELEASE ORAL DAILY
Qty: 30 CAPSULE | Refills: 0 | Status: SHIPPED | OUTPATIENT
Start: 2024-03-06

## 2024-03-06 RX ADMIN — GABAPENTIN 300 MG: 300 CAPSULE ORAL at 08:03

## 2024-03-06 RX ADMIN — THERA TABS 1 TABLET: TAB at 08:03

## 2024-03-06 RX ADMIN — METOPROLOL TARTRATE 25 MG: 25 TABLET, FILM COATED ORAL at 08:03

## 2024-03-06 RX ADMIN — PANTOPRAZOLE SODIUM 40 MG: 40 TABLET, DELAYED RELEASE ORAL at 08:03

## 2024-03-06 RX ADMIN — NALTREXONE HYDROCHLORIDE 50 MG: 50 TABLET, FILM COATED ORAL at 08:03

## 2024-03-06 RX ADMIN — FOLIC ACID 1 MG: 1 TABLET ORAL at 08:03

## 2024-03-06 RX ADMIN — BUTALBITAL, ACETAMINOPHEN, AND CAFFEINE 1 TABLET: 50; 325; 40 TABLET ORAL at 06:03

## 2024-03-06 RX ADMIN — Medication 1 PATCH: at 08:03

## 2024-03-06 RX ADMIN — ESCITALOPRAM OXALATE 20 MG: 20 TABLET, FILM COATED ORAL at 08:03

## 2024-03-06 RX ADMIN — APIXABAN 5 MG: 5 TABLET, FILM COATED ORAL at 08:03

## 2024-03-06 RX ADMIN — LEVOTHYROXINE SODIUM 75 MCG: 75 TABLET ORAL at 05:03

## 2024-03-06 RX ADMIN — DIAZEPAM 5 MG: 5 TABLET ORAL at 08:03

## 2024-03-06 RX ADMIN — Medication 100 MG: at 08:03

## 2024-03-06 NOTE — DISCHARGE SUMMARY
[facility] - Stepdown Flex ([place] [id number])  [facility] Medicine  Discharge Summary      Patient Name: Earl Abdul  MRN: [id number]  IZA: [id number]  Patient Class: IP- Inpatient  Admission Date: 3/1/2024  Hospital Length of Stay: 4 days  Discharge Date and Time:  03/06/2024 4:57 PM  Attending Physician: Kvng Hargrove MD   Discharging Provider: Kvng Hargrove MD  Primary Care Provider: Andrew Rodriguez MD  [facility] Medicine Team: [facility] MED W Kvng Hargrove MD  Primary Care Team: [facility]OS[facility] MED W    HPI:   Earl Abdul is a 63 y.o. female w/ a PMHx of EtOH use disorder, CLL not currently on any treatment, COPD, HTN, depression on multiple antidepressants, right breast cancer s/p bilateral mastectomy, hypothyroidism. Patient presents to the ED today for generalized fatigue and nausea.  Patient states that these are both chronic issues, but her nauseous becoming unbearable.  Patient was seen on 02/20 for similar complaints and was discharged w/ prescription for Zofran, which he states has not been helping her nausea.  She was admitted for similar complaints of alcohol withdrawal and atraumatic rhabdomyolysis from 2/10-2/14/24.  In the ED, the pt had signs of active EtOH withdrawal. Last drink was night prior to admission. Per history she drinks a couple glasses per day of vodka, goes through a few bottles a week. Pt was nauseated, but was not vomiting. Pt was treated with benzodiazepines & responded favorably. Pt had atrial fibrillation with RVR which responded to metoprolol IV x3.  She was admitted to the ICU and step-down to hospital medicine today.     She complains of left upper extremity weakness started around 1 week ago.  She denies shortness of breath, chest pain, palpitations, lightheadedness, dark stools, bleeding in urine.    * No surgery found *      Hospital Course:   Pt to presented to the ED on 3/1/2024 with complaint of nausea, confusion, & body aches. In the ED, the pt had signs of  active EtOH withdrawal. Last drink was night prior to admission. Per history she drinks a couple glasses per day of vodka, goes through a few bottles a week. Pt was nauseated, but was not vomiting. Pt was treated with benzodiazepines & responded favorably. Pt had some atrial fib with RVR which responded to metoprolol IV. Now in NSR. Continued on valium taper and has remained stable with improvement of withdrawal symptoms. She will be discharged home with plan to go to drug rehab in [facility] tomorrow. Follow up with cardiology and addiction psych.     Goals of Care Treatment Preferences:  Code Status: Full Code    Health care agent: Spouse is KINA  Health care agent number: No value filed.          What is most important right now is to focus on curative/life-prolongation (regardless of treatment burdens).  Accordingly, we have decided that the best plan to meet the patient's goals includes continuing with treatment.    Physical Exam  Constitutional:       General: She is not in acute distress.     Appearance: She is ill-appearing.   HENT:      Head: Normocephalic and atraumatic.      Mouth/Throat:      Mouth: Mucous membranes are moist.   Eyes:      Extraocular Movements: Extraocular movements intact.      Conjunctiva/sclera: Conjunctivae normal.      Pupils: Pupils are equal, round, and reactive to light.   Neck:      Vascular: No carotid bruit.   Cardiovascular:      Rate and Rhythm: Normal rate and regular rhythm.      Heart sounds: No murmur heard.  Pulmonary:      Effort: No respiratory distress.      Breath sounds: No wheezing or rhonchi.   Abdominal:      General: Bowel sounds are normal.      Tenderness: There is no abdominal tenderness.   Musculoskeletal:         General: No swelling or tenderness.      Cervical back: No rigidity.      Right lower leg: No edema.      Left lower leg: No edema.      Comments: Tremors of BL UE + (chronic 2/2 essential tremor)   Skin:     General: Skin is warm.       Capillary Refill: Capillary refill takes less than 2 seconds.      Coloration: Skin is not jaundiced.      Findings: No rash.   Neurological:      Mental Status: She is alert and oriented to person, place, and time.      Cranial Nerves: No cranial nerve deficit.      Motor: Weakness present.   Psychiatric:         Attention and Perception: She does not perceive auditory or visual hallucinations.         Behavior: Behavior is slowed.         Thought Content: Thought content is not paranoid or delusional. Thought content does not include suicidal ideation.         Judgment: Judgment normal.     Consults:   Consults (From admission, onward)          Status Ordering Provider     Inpatient consult to Psychiatry  Once        Provider:  (Not yet assigned)    Completed FITZ PRETTY     Inpatient consult to Critical Care Medicine  Once        Provider:  (Not yet assigned)    Completed KASEY CHRISTY new Assessment & Plan notes have been filed under this hospital service since the last note was generated.  Service: Hospital Medicine    Final Active Diagnoses:    Diagnosis Date Noted POA    PRINCIPAL PROBLEM:  Alcohol withdrawal [F10.939] 02/07/2014 Yes    Weakness of left upper extremity [R29.898] 03/02/2024 Yes    Anemia, chronic disease [D63.8] 03/02/2024 Yes    Subclinical hyperthyroidism [E05.90] 03/02/2024 Yes    Atrial fibrillation [I48.91] 03/01/2024 No    Type 2 diabetes mellitus with hypoglycemia [E11.649] 11/30/2021 Yes    Hypokalemia [E87.6] 02/10/2020 Yes    Depression with anxiety [F41.8] 08/16/2017 Yes    Hypophosphatemia [E83.39] 10/18/2014 Yes    Nausea [R11.0] 10/16/2014 Yes    Alcohol use disorder, severe, dependence [F10.20] 02/07/2014 Yes    Tobacco abuse [Z72.0] 02/07/2014 Yes     Chronic    Essential hypertension [I10] 02/07/2014 Yes     Chronic      Problems Resolved During this Admission:       Discharged Condition: good    Disposition: Home or Self Care    Follow Up:   Follow-up  Information       [facility] Follow up.    Contact information:  [address] [facility] [address] [phone]                         Patient Instructions:      Ambulatory referral/consult to Cardiology   Standing Status: Future   Referral Priority: Routine Referral Type: Consultation   Referral Reason: Specialty Services Required   Requested Specialty: Cardiology   Number of Visits Requested: 1     Ambulatory referral/consult to Addiction Psychiatry   Standing Status: Future   Referral Priority: Routine Referral Type: Psychiatric   Referral Reason: Specialty Services Required   Requested Specialty: Addiction Medicine   Number of Visits Requested: 1     Ambulatory referral/consult to Smoking Cessation Program   Standing Status: Future   Referral Priority: Routine Referral Type: Consultation   Referral Reason: Specialty Services Required   Requested Specialty: CTTS   Number of Visits Requested: 1     Diet Cardiac     Notify your health care provider if you experience any of the following:  persistent nausea and vomiting or diarrhea     Notify your health care provider if you experience any of the following:  severe uncontrolled pain     Notify your health care provider if you experience any of the following:  difficulty breathing or increased cough     Notify your health care provider if you experience any of the following:  persistent dizziness, light-headedness, or visual disturbances     Notify your health care provider if you experience any of the following:  increased confusion or weakness     Reason for not Prescribing Nicotine Replacement     Order Specific Question Answer Comments   Reason for not Prescribing: Not medically appropriate at this time      Activity as tolerated       Significant Diagnostic Studies: Labs: All labs within the past 24 hours have been reviewed    Pending Diagnostic Studies:       None           Medications:  Reconciled Home Medications:      Medication List         START taking these medications      ELIQUIS 5 mg Tab  Generic drug: apixaban  Take 1 tablet (5 mg total) by mouth 2 (two) times daily.     EScitalopram oxalate 20 MG tablet  Commonly known as: LEXAPRO  Take 1 tablet (20 mg total) by mouth once daily.  Start taking on: March 7, 2024     metoprolol succinate 50 MG 24 hr tablet  Commonly known as: TOPROL-XL  Take 1 tablet (50 mg total) by mouth once daily.  Start taking on: March 7, 2024     nicotine 21 mg/24 hr  Commonly known as: NICODERM CQ  Place 1 patch onto the skin once daily.  Start taking on: March 7, 2024     omeprazole 20 MG capsule  Commonly known as: PRILOSEC  Take 1 capsule (20 mg total) by mouth once daily.  Replaces: omeprazole 20 MG tablet            CONTINUE taking these medications      ACCU-CHEK SOFTCLIX LANCETS Misc  Generic drug: lancets  Use to check blood glucose 4 times daily     folic acid 1 MG tablet  Commonly known as: FOLVITE  Take 1 tablet (1 mg total) by mouth once daily.     gabapentin 300 MG capsule  Commonly known as: NEURONTIN  Take 1 capsule (300 mg total) by mouth 3 (three) times daily.     levothyroxine 75 MCG tablet  Commonly known as: SYNTHROID  Take 1 tablet (75 mcg total) by mouth before breakfast.     metFORMIN 500 MG ER 24hr tablet  Commonly known as: GLUCOPHAGE-XR  Take 500 mg by mouth 2 (two) times daily with meals.     multivitamin Tab  Take 1 tablet by mouth once daily.     naltrexone 50 mg tablet  Commonly known as: DEPADE  Take 50 mg by mouth once daily.     sucralfate 1 gram tablet  Commonly known as: CARAFATE  Take 1 tablet (1 g total) by mouth 4 (four) times daily.     thiamine 100 MG tablet  Take 1 tablet (100 mg total) by mouth once daily.     traZODone 100 MG tablet  Commonly known as: DESYREL  Take 2 tablets (200 mg total) by mouth every evening.            STOP taking these medications      chlordiazepoxide 10 MG capsule  Commonly known as: LIBRIUM     FLUoxetine 10 MG capsule     omeprazole 20 MG  tablet  Commonly known as: PRILOSEC OTC  Replaced by: omeprazole 20 MG capsule     ondansetron 8 MG tablet  Commonly known as: ZOFRAN              Indwelling Lines/Drains at time of discharge:   Lines/Drains/Airways       None                   Time spent on the discharge of patient: 35 minutes         Kvng Hargrove MD  Department of Hospital Medicine  [facility] - Stepdown Flex ([place]-)

## 2024-03-06 NOTE — PT/OT/SLP PROGRESS
Occupational Therapy   Treatment & Discharge Summary    Name: Earl Abdul  MRN: [id number]  Admitting Diagnosis:  Alcohol withdrawal       Recommendations:     Discharge Recommendations: Low Intensity Therapy  Discharge Equipment Recommendations:  none  Barriers to discharge:  None    Assessment:     Earl Abdul is a 63 y.o. female with a medical diagnosis of Alcohol withdrawal.  She presents with good participation and motivation. Recommended follow-up OT upon hospital discharge to address deficits in RUE shoulder mobility.  Pt at Supervision level of functioning with ADL's assessed therefore no further OT needed on acute.    Rehab Prognosis:  Good; patient would benefit from acute skilled OT services to address these deficits and reach maximum level of function.       Plan:   Discharge OT on acute.   Plan of Care Reviewed with: patient, spouse    Subjective     Chief Complaint: wanting to be discharged  Patient/Family Comments/goals: Pt reported that she was moving fine.  Pain/Comfort:  Pain Rating 1: 0/10  Pain Rating Post-Intervention 1: 0/10    Objective:     Communicated with: RN prior to session.  Patient found supine with telemetry (spouse present) upon OT entry to room.    General Precautions: Standard, fall, seizure    Orthopedic Precautions:N/A  Braces: N/A  Respiratory Status: Room air     Occupational Performance:     Bed Mobility:    Patient completed Supine to Sit with modified independence  Patient completed Sit to Supine with modified independence     Functional Mobility/Transfers:  Patient completed Sit <> Stand Transfer with modified independence  with  no assistive device from EOB & toilet  Functional Mobility: SBA with functional mobility in room & hallway.  Pt with 1 mild episode of decreased balance when looking upward while walking with ability to self correct with SBA.    Activities of Daily Living:  Grooming: supervision while standing at bathroom sink  Upper Body Dressing:  supervision donning gown around back while standing  Lower Body Dressing: supervision donning shoes while standing (slip-on shoes)      [facility] 6 Click ADL: 19    Treatment & Education:  Pt performed AROM exercises with RUE in 4 planes x 10 reps each & with LUE in 2 planes x 10 reps each (no shoulder ROM due to pt unable to elevate at shoulder) while standing.  Provided education on safety during all mobility with pt & spouse voicing no concerns regarding pt's current mobility status. Discussed that OT would discharge pt from OT & pt & spouse in agreement. Recommended further therapy for left shoulder deficits.  Pt had no further questions & when asked whether there were any concerns pt reported none.      Patient left supine with all lines intact, call button in reach, RN notified, and spouse present    GOALS:   Multidisciplinary Problems       Occupational Therapy Goals       Not on file              Multidisciplinary Problems (Resolved)          Problem: Occupational Therapy    Goal Priority Disciplines Outcome Interventions   Occupational Therapy Goal   (Resolved)     OT, PT/OT Met    Description: Goals to be met by: 4/5/24     Patient will increase functional independence with ADLs by performing:    UE Dressing with Modified [address].  LE Dressing with Modified [address].  Grooming while standing at sink with Modified [address].  Toileting from toilet with Modified [address] for hygiene and clothing management.   Supine to sit with Modified [address].  Step transfer with Modified [address]  Toilet transfer to toilet with Modified [address].                         Time Tracking:     OT Date of Treatment: 03/06/24  OT Start Time: 1049  OT Stop Time: 1108  OT Total Time (min): 19 min    Billable Minutes:Self Care/Home Management 19    OT/YAMILETH: OT          3/6/2024

## 2024-03-06 NOTE — PT/OT/SLP PROGRESS
"Physical Therapy Treatment    Patient Name:  Earl Abdul   MRN:  [id number]    Recommendations:     Discharge Recommendations: Low Intensity Therapy  Discharge Equipment Recommendations: none  Barriers to discharge: None    Assessment:     Earl Abdul is a 63 y.o. female admitted with a medical diagnosis of Alcohol withdrawal.  She presents with the following impairments/functional limitations: impaired endurance . Patient progressing well. She ambulated 400 feet with supervision without an assistive device and ascended/descended 12 steps w/ SBA w/ BHR descending and RHR ascending..    Rehab Prognosis: Good; patient would benefit from acute skilled PT services to address these deficits and reach maximum level of function.    Recent Surgery: * No surgery found *      Plan:     During this hospitalization, patient to be seen 3 x/week to address the identified rehab impairments via gait training, therapeutic activities, therapeutic exercises and progress toward the following goals:    Plan of Care Expires:  04/05/24    Subjective     Chief Complaint: "I'm doing fine."  Patient/Family Comments/goals: return home to [facility]  Pain/Comfort:  Pain Rating 1: 0/10  Pain Rating Post-Intervention 1: 0/10      Objective:     Communicated with nurse prior to session.  Patient found HOB elevated with  (no lines) upon PT entry to room. Spouse present.    General Precautions: Standard, fall  Orthopedic Precautions: N/A  Braces: N/A  Respiratory Status: Room air     Functional Mobility:  Bed Mobility:     Supine to Sit: supervision  Transfers:     Sit to Stand:  supervision with no AD  Gait: patient ambulates 400 feet w/o assistive device w/ supervision.   Ascend 12 steps w/ SBA w/ RHR; descend 12 steps w/ BHR and SBA. Educated patient and spouse on using single HR and touching wall with other, recommend SBA initially. Demonstrated use of B Hands holding RHR.       AM-PAC 6 CLICK MOBILITY  Turning over in bed (including " adjusting bedclothes, sheets and blankets)?: 4  Sitting down on and standing up from a chair with arms (e.g., wheelchair, bedside commode, etc.): 3  Moving from lying on back to sitting on the side of the bed?: 4  Moving to and from a bed to a chair (including a wheelchair)?: 3  Need to walk in hospital room?: 3  Climbing 3-5 steps with a railing?: 3  Basic Mobility Total Score: 20       Treatment & Education:  Patient and spouse educated in PT POC, gait including steps    Patient left  walking to toilet w/ spouse present  with all lines intact..    GOALS:   Multidisciplinary Problems       Physical Therapy Goals          Problem: Physical Therapy    Goal Priority Disciplines Outcome Goal Variances Interventions   Physical Therapy Goal     PT, PT/OT Ongoing, Progressing     Description: Goals to be met by: 2024     Patient will increase functional independence with mobility by performin. Supine to sit with Modified [place]  2. Sit to supine with Modified [place]  3. Sit to stand transfer with Supervision = met 3/6/2024   4. Bed to chair transfer with Supervision using LRAD = met 3/6/2024  5. Gait  x 100 feet with Supervision using LRAD. = met 3/6/2024   6. Ascend/descend 10 stair with right Handrails Contact Guard Assistance using LRAD.                          Time Tracking:     PT Received On: 24  PT Start Time: 1120     PT Stop Time: 1130  PT Total Time (min): 10 min     Billable Minutes: Gait Training 10    Treatment Type: Treatment  PT/PTA: PT     Number of PTA visits since last PT visit: 0     2024

## 2024-03-06 NOTE — PLAN OF CARE
Problem: Physical Therapy  Goal: Physical Therapy Goal  Description: Goals to be met by: 2024     Patient will increase functional independence with mobility by performin. Supine to sit with Modified [place]  2. Sit to supine with Modified [place]  3. Sit to stand transfer with Supervision = met 3/6/2024   4. Bed to chair transfer with Supervision using LRAD = met 3/6/2024  5. Gait  x 100 feet with Supervision using LRAD. = met 3/6/2024   6. Ascend/descend 10 stair with right Handrails Contact Guard Assistance using LRAD.     Outcome: Ongoing, Progressing

## 2024-03-06 NOTE — NURSING
Pt resting comfortably this shift, no c/o. No PRN lorazepam needed this shift. Pt ambulates independently, 3L NC, reminded pt to wear oxygen at all times as her O2 sats will drop without it. Pt v/u. No significant shift events to report. Will continue to monitor.

## 2024-03-06 NOTE — PLAN OF CARE
"   03/06/24 1548   Post-Acute Status   Post-Acute Authorization Home Health   Home Health Status Set-up Complete/Auth obtained   Hospital Resources/Appts/Education Provided Appointments scheduled and added to S   Patient choice form signed by patient/caregiver List from [facility] Post-Acute Care;List from CMS Compare;List with quality metrics by geographic area provided   Discharge Delays None known at this time   Discharge Plan   Discharge Plan A Home Health  ([facility] Phone: [phone])   Discharge Plan B Home with family     [facility] Phone: [phone]   Response: Yes, willing to accept patient Comments: We will resume care.      Cm secure chat sent to attending to inform of the above , patient is current with [facility]. YAMILETH awaiting orders.    3:45 pm  CM received a signed authorization of release of confidential information to [facility] O: [phone] F: [phone]    Your fax has been successfully sent to [phone] at [phone].  ------------------------------------------------------------  From: [id number]  ------------------------------------------------------------     3/6/2024 3:54:29 PM Transmission Record          Sent to [phone] with remote ID "No ID               "          Result: (0/339;0/0) Success          Page record: 1 - 60          Elapsed time: 23:27 on channel 42        Nusrat Gaxiola RN  Case Management  [facility]  [phone]    "

## 2024-03-06 NOTE — NURSING
DX: Alcohol Withdrawal  Pertinent labs:    Latest Reference Range & Units 03/05/24 05:33   RBC 4.00 - 5.40 M/uL 3.42 (L)   Hemoglobin 12.0 - 16.0 g/dL 9.1 (L)   Hematocrit 37.0 - 48.5 % 30.4 (L)     Client is AAOx4, ambulates independently, on 3L NC, client received PRN Lorazepam once today, no other needs. Safety is intact, wheels are locked, bed is in lowest position.   Like costochondral/MSK due to cough as pt w/ negative trops x2, negative echo by Dr. Cook (cardiology) at [facility], and HD stable w/ no ekg changes from prior. Pt w/ extensive cardiac hx however so will monitor o/n for any signs of HD instability and supportive care for pain.  --asa 81 qdaily  --tylenol PRN pain  --c/w other home cardiac medications: ace, beta blocker, lipitor Like costochondral/MSK due to cough as pt w/ negative trops x2, negative echo by Dr. Cook (cardiology) at [facility], and HD stable w/ no ekg changes from prior. Pt w/ extensive cardiac hx however so will monitor o/n for any signs of HD instability and supportive care for pain. D-dimer negative as well so PE unlikely.   --asa 81 qdaily  --tylenol PRN pain  --c/w other home cardiac medications: ace, beta blocker, lipitor

## 2024-03-06 NOTE — PLAN OF CARE
Problem: Occupational Therapy  Goal: Occupational Therapy Goal  Description: Goals to be met by: 4/5/24     Patient will increase functional independence with ADLs by performing:    UE Dressing with Modified [place].  LE Dressing with Modified [place].  Grooming while standing at sink with Modified [place].  Toileting from toilet with Modified [place] for hygiene and clothing management.   Supine to sit with Modified [place].  Step transfer with Modified [place]  Toilet transfer to toilet with Modified [place].    Outcome: Met     Pt at Supervision level of functional with ADL's assessed therefore no further skilled OT needed.

## 2024-03-12 ENCOUNTER — TELEPHONE (OUTPATIENT)
Dept: INTERNAL MEDICINE | Facility: CLINIC | Age: 64
End: 2024-03-12
Payer: COMMERCIAL

## 2024-03-12 NOTE — TELEPHONE ENCOUNTER
----- Message from Yara Almazan sent at 3/12/2024  1:09 PM CDT -----  Contact: Alejandro  Type:  Patient Call          Who Called: Virginia Murphy         Does the patient know what this is regarding?: requesting a call back to discuss the the contact information for the team ; please advise           Best Call Back Number:[phone]            Additional Information: Fax [phone]

## 2024-03-12 NOTE — TELEPHONE ENCOUNTER
Patient is in a [facility] in [place]. The hospital called for our fax number so that they can send information on the patient in the hospital

## 2024-03-18 LAB — FUNGUS SPEC CULT: NORMAL

## 2024-03-25 ENCOUNTER — TELEPHONE (OUTPATIENT)
Dept: INTERNAL MEDICINE | Facility: CLINIC | Age: 64
End: 2024-03-25
Payer: COMMERCIAL

## 2024-03-25 NOTE — TELEPHONE ENCOUNTER
"Pt wants to know if       "if provider prescribes pt's oxygen concentrator and if so to clarify recommended settings "    " Luis did not show up for his appointment for chemotherapy.  I called and left a message telling him of the urgency of keeping his appointment for treatment

## 2024-03-25 NOTE — TELEPHONE ENCOUNTER
----- Message from Xenia Horne sent at 3/22/2024  2:06 PM CDT -----  Regarding: call back  Type: Patient Call Back    Who called: Virginia Muhammad     What is the request in detail: requesting a call back to see if provider prescribes pt's oxygen concentrator and if so to clarify recommended settings     Can the clinic reply by [facility]?no    Would the patient rather a call back or a response via [facility]? call    Best call back number: [phone] (confidential voicemail)     Additional Information:

## 2024-03-26 NOTE — TELEPHONE ENCOUNTER
[facility] is a mental health facility, patient arrived with a O2 concentrator and was not able to tell the internal med team there how she should be using the 02 due to her memory issues she is currently using 3 liters at bed time they are calling to see if this is how she should be using the 02. Please advise  They are just trying to be sure she's using this correctly

## 2024-04-01 LAB
ACID FAST MOD KINY STN SPEC: NORMAL
MYCOBACTERIUM SPEC QL CULT: NORMAL

## 2024-04-07 ENCOUNTER — HOSPITAL ENCOUNTER (INPATIENT)
Facility: OTHER | Age: 64
LOS: 1 days | Discharge: HOME | DRG: 641 | End: 2024-04-08
Attending: EMERGENCY MEDICINE | Admitting: HOSPITALIST
Payer: COMMERCIAL

## 2024-04-07 DIAGNOSIS — E87.20 METABOLIC ACIDOSIS: ICD-10-CM

## 2024-04-07 DIAGNOSIS — D72.829 LEUKOCYTOSIS, UNSPECIFIED TYPE: ICD-10-CM

## 2024-04-07 DIAGNOSIS — F10.20 ALCOHOL USE DISORDER, SEVERE, DEPENDENCE: Primary | ICD-10-CM

## 2024-04-07 PROBLEM — I48.0 PAROXYSMAL ATRIAL FIBRILLATION: Status: ACTIVE | Noted: 2024-03-01

## 2024-04-07 LAB
ALBUMIN SERPL BCP-MCNC: 4.1 G/DL (ref 3.5–5.2)
ALBUMIN SERPL BCP-MCNC: 4.7 G/DL (ref 3.5–5.2)
ALLENS TEST: ABNORMAL
ALP SERPL-CCNC: 57 U/L (ref 55–135)
ALT SERPL W/O P-5'-P-CCNC: 22 U/L (ref 10–44)
ANION GAP SERPL CALC-SCNC: 15 MMOL/L (ref 8–16)
ANION GAP SERPL CALC-SCNC: 26 MMOL/L (ref 8–16)
AST SERPL-CCNC: 34 U/L (ref 10–40)
B-OH-BUTYR BLD STRIP-SCNC: 7.3 MMOL/L (ref 0–0.5)
BASOPHILS # BLD AUTO: ABNORMAL K/UL (ref 0–0.2)
BASOPHILS NFR BLD: 0 % (ref 0–1.9)
BILIRUB SERPL-MCNC: 0.7 MG/DL (ref 0.1–1)
BILIRUB UR QL STRIP: NEGATIVE
BUN SERPL-MCNC: 12 MG/DL (ref 8–23)
BUN SERPL-MCNC: 9 MG/DL (ref 8–23)
CALCIUM SERPL-MCNC: 8.7 MG/DL (ref 8.7–10.5)
CALCIUM SERPL-MCNC: 9.3 MG/DL (ref 8.7–10.5)
CHLORIDE SERPL-SCNC: 100 MMOL/L (ref 95–110)
CHLORIDE SERPL-SCNC: 101 MMOL/L (ref 95–110)
CK SERPL-CCNC: 56 U/L (ref 20–180)
CLARITY UR: CLEAR
CO2 SERPL-SCNC: 12 MMOL/L (ref 23–29)
CO2 SERPL-SCNC: 19 MMOL/L (ref 23–29)
COLOR UR: YELLOW
CREAT SERPL-MCNC: 0.9 MG/DL (ref 0.5–1.4)
CREAT SERPL-MCNC: 0.9 MG/DL (ref 0.5–1.4)
CTP QC/QA: YES
CTP QC/QA: YES
DIFFERENTIAL METHOD BLD: ABNORMAL
EOSINOPHIL # BLD AUTO: ABNORMAL K/UL (ref 0–0.5)
EOSINOPHIL NFR BLD: 0 % (ref 0–8)
ERYTHROCYTE [DISTWIDTH] IN BLOOD BY AUTOMATED COUNT: 18.9 % (ref 11.5–14.5)
EST. GFR  (NO RACE VARIABLE): >60 ML/MIN/1.73 M^2
EST. GFR  (NO RACE VARIABLE): >60 ML/MIN/1.73 M^2
ETHANOL SERPL-MCNC: 143 MG/DL
FIO2: 32
FIO2: 32
GLUCOSE SERPL-MCNC: 122 MG/DL (ref 70–110)
GLUCOSE SERPL-MCNC: 60 MG/DL (ref 70–110)
GLUCOSE UR QL STRIP: ABNORMAL
HCO3 UR-SCNC: 17.8 MMOL/L (ref 24–28)
HCO3 UR-SCNC: 22.4 MMOL/L (ref 24–28)
HCT VFR BLD AUTO: 39.4 % (ref 37–48.5)
HCT VFR BLD CALC: 34 %PCV (ref 36–54)
HGB BLD-MCNC: 11.3 G/DL (ref 12–16)
HGB BLD-MCNC: 12 G/DL
HGB UR QL STRIP: NEGATIVE
IMM GRANULOCYTES # BLD AUTO: ABNORMAL K/UL (ref 0–0.04)
IMM GRANULOCYTES NFR BLD AUTO: ABNORMAL % (ref 0–0.5)
KETONES UR QL STRIP: ABNORMAL
LACTATE SERPL-SCNC: 2 MMOL/L (ref 0.5–2.2)
LDH SERPL L TO P-CCNC: 2.21 MMOL/L (ref 0.5–2.2)
LEUKOCYTE ESTERASE UR QL STRIP: NEGATIVE
LYMPHOCYTES # BLD AUTO: ABNORMAL K/UL (ref 1–4.8)
LYMPHOCYTES NFR BLD: 71 % (ref 18–48)
MAGNESIUM SERPL-MCNC: 1.8 MG/DL (ref 1.6–2.6)
MCH RBC QN AUTO: 24.8 PG (ref 27–31)
MCHC RBC AUTO-ENTMCNC: 28.7 G/DL (ref 32–36)
MCV RBC AUTO: 86 FL (ref 82–98)
MONOCYTES # BLD AUTO: ABNORMAL K/UL (ref 0.3–1)
MONOCYTES NFR BLD: 3 % (ref 4–15)
NEUTROPHILS NFR BLD: 26 % (ref 38–73)
NITRITE UR QL STRIP: NEGATIVE
NRBC BLD-RTO: 0 /100 WBC
PCO2 BLDA: 39.9 MMHG (ref 35–45)
PCO2 BLDA: 43.4 MMHG (ref 35–45)
PH SMN: 7.22 [PH] (ref 7.35–7.45)
PH SMN: 7.36 [PH] (ref 7.35–7.45)
PH UR STRIP: 6 [PH] (ref 5–8)
PHOSPHATE SERPL-MCNC: 2.2 MG/DL (ref 2.7–4.5)
PLATELET # BLD AUTO: 173 K/UL (ref 150–450)
PLATELET BLD QL SMEAR: ABNORMAL
PMV BLD AUTO: 9.3 FL (ref 9.2–12.9)
PO2 BLDA: 104 MMHG (ref 40–60)
PO2 BLDA: 48 MMHG (ref 40–60)
POC BE: -10 MMOL/L
POC BE: -3 MMOL/L
POC MOLECULAR INFLUENZA A AGN: NEGATIVE
POC MOLECULAR INFLUENZA B AGN: NEGATIVE
POC SATURATED O2: 75 % (ref 95–100)
POC SATURATED O2: 98 % (ref 95–100)
POC TCO2: 19 MMOL/L (ref 24–29)
POC TCO2: 24 MMOL/L (ref 24–29)
POCT GLUCOSE: 196 MG/DL (ref 70–110)
POCT GLUCOSE: 284 MG/DL (ref 70–110)
POCT GLUCOSE: 98 MG/DL (ref 70–110)
POIKILOCYTOSIS BLD QL SMEAR: SLIGHT
POTASSIUM SERPL-SCNC: 3.7 MMOL/L (ref 3.5–5.1)
POTASSIUM SERPL-SCNC: 4.6 MMOL/L (ref 3.5–5.1)
PROCALCITONIN SERPL IA-MCNC: 0.05 NG/ML
PROT SERPL-MCNC: 7.8 G/DL (ref 6–8.4)
PROT UR QL STRIP: ABNORMAL
RBC # BLD AUTO: 4.56 M/UL (ref 4–5.4)
SAMPLE: ABNORMAL
SARS-COV-2 RDRP RESP QL NAA+PROBE: NEGATIVE
SITE: ABNORMAL
SODIUM SERPL-SCNC: 135 MMOL/L (ref 136–145)
SODIUM SERPL-SCNC: 138 MMOL/L (ref 136–145)
SP GR UR STRIP: 1.01 (ref 1–1.03)
STOMATOCYTES BLD QL SMEAR: PRESENT
URN SPEC COLLECT METH UR: ABNORMAL
UROBILINOGEN UR STRIP-ACNC: NEGATIVE EU/DL
WBC # BLD AUTO: 34.02 K/UL (ref 3.9–12.7)

## 2024-04-07 PROCEDURE — 84145 PROCALCITONIN (PCT): CPT | Performed by: EMERGENCY MEDICINE

## 2024-04-07 PROCEDURE — 83605 ASSAY OF LACTIC ACID: CPT | Performed by: EMERGENCY MEDICINE

## 2024-04-07 PROCEDURE — 25000003 PHARM REV CODE 250: Performed by: EMERGENCY MEDICINE

## 2024-04-07 PROCEDURE — 83735 ASSAY OF MAGNESIUM: CPT | Performed by: EMERGENCY MEDICINE

## 2024-04-07 PROCEDURE — 80069 RENAL FUNCTION PANEL: CPT | Performed by: NURSE PRACTITIONER

## 2024-04-07 PROCEDURE — 85027 COMPLETE CBC AUTOMATED: CPT | Performed by: EMERGENCY MEDICINE

## 2024-04-07 PROCEDURE — 94761 N-INVAS EAR/PLS OXIMETRY MLT: CPT | Mod: XB

## 2024-04-07 PROCEDURE — 85060 BLOOD SMEAR INTERPRETATION: CPT | Mod: ,,, | Performed by: STUDENT IN AN ORGANIZED HEALTH CARE EDUCATION/TRAINING PROGRAM

## 2024-04-07 PROCEDURE — 80053 COMPREHEN METABOLIC PANEL: CPT | Performed by: EMERGENCY MEDICINE

## 2024-04-07 PROCEDURE — 99285 EMERGENCY DEPT VISIT HI MDM: CPT | Mod: 25

## 2024-04-07 PROCEDURE — 21400001 HC TELEMETRY ROOM

## 2024-04-07 PROCEDURE — 87635 SARS-COV-2 COVID-19 AMP PRB: CPT | Performed by: EMERGENCY MEDICINE

## 2024-04-07 PROCEDURE — S5010 5% DEXTROSE AND 0.45% SALINE: HCPCS | Performed by: EMERGENCY MEDICINE

## 2024-04-07 PROCEDURE — 82077 ASSAY SPEC XCP UR&BREATH IA: CPT | Performed by: EMERGENCY MEDICINE

## 2024-04-07 PROCEDURE — 25000003 PHARM REV CODE 250: Performed by: NURSE PRACTITIONER

## 2024-04-07 PROCEDURE — 82962 GLUCOSE BLOOD TEST: CPT

## 2024-04-07 PROCEDURE — 96361 HYDRATE IV INFUSION ADD-ON: CPT

## 2024-04-07 PROCEDURE — 82550 ASSAY OF CK (CPK): CPT | Performed by: EMERGENCY MEDICINE

## 2024-04-07 PROCEDURE — 82803 BLOOD GASES ANY COMBINATION: CPT

## 2024-04-07 PROCEDURE — 87040 BLOOD CULTURE FOR BACTERIA: CPT | Performed by: EMERGENCY MEDICINE

## 2024-04-07 PROCEDURE — 82010 KETONE BODYS QUAN: CPT | Performed by: EMERGENCY MEDICINE

## 2024-04-07 PROCEDURE — 85007 BL SMEAR W/DIFF WBC COUNT: CPT | Performed by: EMERGENCY MEDICINE

## 2024-04-07 PROCEDURE — 63600175 PHARM REV CODE 636 W HCPCS: Performed by: NURSE PRACTITIONER

## 2024-04-07 PROCEDURE — 36415 COLL VENOUS BLD VENIPUNCTURE: CPT | Performed by: EMERGENCY MEDICINE

## 2024-04-07 PROCEDURE — 81003 URINALYSIS AUTO W/O SCOPE: CPT | Performed by: EMERGENCY MEDICINE

## 2024-04-07 PROCEDURE — 96374 THER/PROPH/DIAG INJ IV PUSH: CPT

## 2024-04-07 PROCEDURE — 63600175 PHARM REV CODE 636 W HCPCS: Performed by: EMERGENCY MEDICINE

## 2024-04-07 PROCEDURE — 99900035 HC TECH TIME PER 15 MIN (STAT)

## 2024-04-07 RX ORDER — AMOXICILLIN 250 MG
1 CAPSULE ORAL 2 TIMES DAILY
Status: DISCONTINUED | OUTPATIENT
Start: 2024-04-07 | End: 2024-04-08 | Stop reason: HOSPADM

## 2024-04-07 RX ORDER — PANTOPRAZOLE SODIUM 40 MG/1
40 TABLET, DELAYED RELEASE ORAL DAILY
Status: DISCONTINUED | OUTPATIENT
Start: 2024-04-08 | End: 2024-04-08 | Stop reason: HOSPADM

## 2024-04-07 RX ORDER — LORAZEPAM 2 MG/ML
2 INJECTION INTRAMUSCULAR
Status: DISCONTINUED | OUTPATIENT
Start: 2024-04-07 | End: 2024-04-08 | Stop reason: HOSPADM

## 2024-04-07 RX ORDER — ONDANSETRON 8 MG/1
8 TABLET, ORALLY DISINTEGRATING ORAL EVERY 8 HOURS PRN
Status: DISCONTINUED | OUTPATIENT
Start: 2024-04-07 | End: 2024-04-08 | Stop reason: HOSPADM

## 2024-04-07 RX ORDER — GLUCAGON 1 MG
1 KIT INJECTION
Status: DISCONTINUED | OUTPATIENT
Start: 2024-04-07 | End: 2024-04-08 | Stop reason: HOSPADM

## 2024-04-07 RX ORDER — TRAZODONE HYDROCHLORIDE 100 MG/1
200 TABLET ORAL NIGHTLY
Status: DISCONTINUED | OUTPATIENT
Start: 2024-04-07 | End: 2024-04-08 | Stop reason: HOSPADM

## 2024-04-07 RX ORDER — DEXTROSE MONOHYDRATE AND SODIUM CHLORIDE 5; .45 G/100ML; G/100ML
1000 INJECTION, SOLUTION INTRAVENOUS
Status: COMPLETED | OUTPATIENT
Start: 2024-04-07 | End: 2024-04-07

## 2024-04-07 RX ORDER — ESCITALOPRAM OXALATE 10 MG/1
20 TABLET ORAL DAILY
Status: DISCONTINUED | OUTPATIENT
Start: 2024-04-08 | End: 2024-04-08 | Stop reason: HOSPADM

## 2024-04-07 RX ORDER — POLYETHYLENE GLYCOL 3350 17 G/17G
17 POWDER, FOR SOLUTION ORAL 2 TIMES DAILY PRN
Status: DISCONTINUED | OUTPATIENT
Start: 2024-04-07 | End: 2024-04-08 | Stop reason: HOSPADM

## 2024-04-07 RX ORDER — MAGNESIUM SULFATE HEPTAHYDRATE 40 MG/ML
2 INJECTION, SOLUTION INTRAVENOUS ONCE
Status: COMPLETED | OUTPATIENT
Start: 2024-04-07 | End: 2024-04-07

## 2024-04-07 RX ORDER — GABAPENTIN 300 MG/1
300 CAPSULE ORAL 3 TIMES DAILY
Status: DISCONTINUED | OUTPATIENT
Start: 2024-04-07 | End: 2024-04-08 | Stop reason: HOSPADM

## 2024-04-07 RX ORDER — SUCRALFATE 1 G/1
1 TABLET ORAL 4 TIMES DAILY
Status: DISCONTINUED | OUTPATIENT
Start: 2024-04-07 | End: 2024-04-08 | Stop reason: HOSPADM

## 2024-04-07 RX ORDER — TRAMADOL HYDROCHLORIDE 50 MG/1
50 TABLET ORAL EVERY 6 HOURS PRN
Status: DISCONTINUED | OUTPATIENT
Start: 2024-04-07 | End: 2024-04-08 | Stop reason: HOSPADM

## 2024-04-07 RX ORDER — FOLIC ACID 1 MG/1
1 TABLET ORAL DAILY
Status: DISCONTINUED | OUTPATIENT
Start: 2024-04-08 | End: 2024-04-08 | Stop reason: HOSPADM

## 2024-04-07 RX ORDER — METOPROLOL SUCCINATE 50 MG/1
50 TABLET, EXTENDED RELEASE ORAL DAILY
Status: DISCONTINUED | OUTPATIENT
Start: 2024-04-08 | End: 2024-04-08 | Stop reason: HOSPADM

## 2024-04-07 RX ORDER — IBUPROFEN 200 MG
1 TABLET ORAL DAILY
Status: DISCONTINUED | OUTPATIENT
Start: 2024-04-08 | End: 2024-04-08 | Stop reason: HOSPADM

## 2024-04-07 RX ORDER — LORAZEPAM 1 MG/1
2 TABLET ORAL EVERY 4 HOURS PRN
Status: DISCONTINUED | OUTPATIENT
Start: 2024-04-07 | End: 2024-04-08 | Stop reason: HOSPADM

## 2024-04-07 RX ORDER — SODIUM CHLORIDE 0.9 % (FLUSH) 0.9 %
10 SYRINGE (ML) INJECTION
Status: DISCONTINUED | OUTPATIENT
Start: 2024-04-07 | End: 2024-04-08 | Stop reason: HOSPADM

## 2024-04-07 RX ORDER — LORAZEPAM 2 MG/ML
1 INJECTION INTRAMUSCULAR
Status: COMPLETED | OUTPATIENT
Start: 2024-04-07 | End: 2024-04-07

## 2024-04-07 RX ORDER — SODIUM CHLORIDE, SODIUM LACTATE, POTASSIUM CHLORIDE, CALCIUM CHLORIDE 600; 310; 30; 20 MG/100ML; MG/100ML; MG/100ML; MG/100ML
INJECTION, SOLUTION INTRAVENOUS CONTINUOUS
Status: DISCONTINUED | OUTPATIENT
Start: 2024-04-07 | End: 2024-04-08 | Stop reason: HOSPADM

## 2024-04-07 RX ORDER — LANOLIN ALCOHOL/MO/W.PET/CERES
100 CREAM (GRAM) TOPICAL DAILY
Status: DISCONTINUED | OUTPATIENT
Start: 2024-04-08 | End: 2024-04-08 | Stop reason: HOSPADM

## 2024-04-07 RX ORDER — ACETAMINOPHEN 325 MG/1
650 TABLET ORAL EVERY 4 HOURS PRN
Status: DISCONTINUED | OUTPATIENT
Start: 2024-04-07 | End: 2024-04-08 | Stop reason: HOSPADM

## 2024-04-07 RX ORDER — LEVOTHYROXINE SODIUM 75 UG/1
75 TABLET ORAL
Status: DISCONTINUED | OUTPATIENT
Start: 2024-04-08 | End: 2024-04-08 | Stop reason: HOSPADM

## 2024-04-07 RX ORDER — ONDANSETRON HYDROCHLORIDE 2 MG/ML
4 INJECTION, SOLUTION INTRAVENOUS EVERY 8 HOURS PRN
Status: DISCONTINUED | OUTPATIENT
Start: 2024-04-07 | End: 2024-04-08 | Stop reason: HOSPADM

## 2024-04-07 RX ADMIN — SODIUM CHLORIDE, POTASSIUM CHLORIDE, SODIUM LACTATE AND CALCIUM CHLORIDE: 600; 310; 30; 20 INJECTION, SOLUTION INTRAVENOUS at 11:04

## 2024-04-07 RX ADMIN — LORAZEPAM 1 MG: 2 INJECTION INTRAMUSCULAR; INTRAVENOUS at 03:04

## 2024-04-07 RX ADMIN — DEXTROSE AND SODIUM CHLORIDE 1000 ML: 5; 450 INJECTION, SOLUTION INTRAVENOUS at 03:04

## 2024-04-07 RX ADMIN — MAGNESIUM SULFATE HEPTAHYDRATE 2 G: 40 INJECTION, SOLUTION INTRAVENOUS at 05:04

## 2024-04-07 RX ADMIN — LORAZEPAM 2 MG: 1 TABLET ORAL at 06:04

## 2024-04-07 RX ADMIN — APIXABAN 5 MG: 2.5 TABLET, FILM COATED ORAL at 08:04

## 2024-04-07 RX ADMIN — SUCRALFATE 1 G: 1 TABLET ORAL at 08:04

## 2024-04-07 RX ADMIN — FOLIC ACID: 5 INJECTION, SOLUTION INTRAMUSCULAR; INTRAVENOUS; SUBCUTANEOUS at 05:04

## 2024-04-07 RX ADMIN — CEFTRIAXONE SODIUM 2 G: 2 INJECTION, POWDER, FOR SOLUTION INTRAMUSCULAR; INTRAVENOUS at 04:04

## 2024-04-07 RX ADMIN — GABAPENTIN 300 MG: 300 CAPSULE ORAL at 08:04

## 2024-04-07 RX ADMIN — TRAZODONE HYDROCHLORIDE 200 MG: 100 TABLET ORAL at 08:04

## 2024-04-07 RX ADMIN — ACETAMINOPHEN 650 MG: 325 TABLET, FILM COATED ORAL at 08:04

## 2024-04-07 NOTE — Clinical Note
Diagnosis: Metabolic acidosis [423645]   Future Attending Provider: CAMILLE WILLOUGHBY [[id number]]   Reason for IP Medical Treatment  (Clinical interventions that can only be accomplished in the IP setting? ) :: Electrolyte correction, treatment for withdrawal   I certify that Inpatient services for greater than or equal to 2 midnights are medically necessary:: Yes   Plans for Post-Acute care--if anticipated (pick the single best option):: A. No post acute care anticipated at this time   Special Needs:: No Special Needs [1]

## 2024-04-07 NOTE — SUBJECTIVE & OBJECTIVE
Past Medical History:   Diagnosis Date    Alcoholism     c/b alcohol withdrawl seizures 7/2017    Anemia     Cancer of breast 10/2020    s/p bilateral mastectomy for  T1b N0 stage IA breast cancer October 2020    CLL (chronic lymphocytic leukemia) 09/30/2022 6/22/22 - PB flow cytometry  Immunophenotyping of peripheral blood detects a distinct kappa light chain restricted monoclonal B-cell population  (calculated at 2.44x10 9 /L, from the most recent CBC showing a total WBC of  7.35 K/uL with 61% total lymphocytes)  with a CLL phenotype (coexpression of CD19, CD5, CD23 and dim CD20). CD22 (FITC), FMC-7 and CD38 are negative in this population.    Controlled type 2 diabetes mellitus without complication, without long-term current use of insulin 11/30/2021    COPD (chronic obstructive pulmonary disease)     Depression     Diverticulitis     Fatty liver     GERD (gastroesophageal reflux disease)     Hyperlipidemia     Hypertension     Pancreatitis     Peptic ulcer disease     Polysubstance abuse     Posterior reversible encephalopathy syndrome     Sarcoidosis of lung     over 30 yrs ago    Suicide attempt        Past Surgical History:   Procedure Laterality Date    APPENDECTOMY      BILATERAL MASTECTOMY Bilateral 10/29/2020    Procedure: MASTECTOMY, BILATERAL;  Surgeon: Baylee Kevin MD;  Location: [address];  Service: General;  Laterality: Bilateral;    BREAST REVISION SURGERY Bilateral 2/11/2021    Procedure: BREAST REVISION SURGERY;  Surgeon: Scottie Johnson MD;  Location: [address];  Service: Plastics;  Laterality: Bilateral;    COLONOSCOPY N/A 7/28/2017    Procedure: COLONOSCOPY;  Surgeon: Aaron Alvarado MD;  Location: [facility];  Service: Endoscopy;  Laterality: N/A;    ESOPHAGOGASTRODUODENOSCOPY  10/7/2016, 11/6/2014    2016 - gastritis, duodenitis, 2014 erosive gastritis    ESOPHAGOGASTRODUODENOSCOPY N/A 2/11/2020    Procedure: ESOPHAGOGASTRODUODENOSCOPY (EGD);  Surgeon: Fawn Mon  MD Hema;  Location: [facility];  Service: Endoscopy;  Laterality: N/A;    ESOPHAGOGASTRODUODENOSCOPY N/A 4/19/2021    Procedure: EGD (ESOPHAGOGASTRODUODENOSCOPY);  Surgeon: Paramjit Martino MD;  Location: [facility] ENDO;  Service: Endoscopy;  Laterality: N/A;    FLEXIBLE SIGMOIDOSCOPY  11/06/2014    colitis    HYSTERECTOMY      IMPLANTATION OF PERMANENT SACRAL NERVE STIMULATOR N/A 7/12/2022    Procedure: INSERTION, NEUROSTIMULATOR, PERMANENT, SACRAL;  Surgeon: Juaquin Edwards MD;  Location: [address];  Service: Urology;  Laterality: N/A;  1hr    INJECTION FOR SENTINEL NODE IDENTIFICATION Right 10/29/2020    Procedure: INJECTION, FOR SENTINEL NODE IDENTIFICATION;  Surgeon: Baylee Kevin MD;  Location: [address];  Service: General;  Laterality: Right;    INJECTION OF JOINT Right 10/10/2019    Procedure: Injection, Joint RIGHT ILIOPSOAS BURSA/TENDON INJECTION AND RIGHT GLUTEAL TENDON INJECTION WITH STEROID AND LIDOCAINE;  Surgeon: Guillaume Rico MD;  Location: [facility] PAIN MGT;  Service: Pain Management;  Laterality: Right;  NEEDS CONSENT    INSERTION OF BREAST TISSUE EXPANDER Bilateral 10/29/2020    Procedure: INSERTION, TISSUE EXPANDER, BREAST;  Surgeon: Scottie Johnson MD;  Location: [address];  Service: Plastics;  Laterality: Bilateral;  Right breast: 1082 g  Left breast: 1076 g    LIPOSUCTION Bilateral 2/11/2021    Procedure: LIPOSUCTION;  Surgeon: Scottie Johnson MD;  Location: [address];  Service: Plastics;  Laterality: Bilateral;    mediastenoscopy      REPLACEMENT OF IMPLANT OF BREAST Bilateral 2/11/2021    Procedure: REPLACEMENT, IMPLANT, BREAST;  Surgeon: Scottie Johnson MD;  [facility]: [facility] OR [address] [place] [address];  Service: Plastics;  Laterality: Bilateral;    SENTINEL LYMPH NODE BIOPSY Right 10/29/2020    Procedure: BIOPSY, LYMPH NODE, SENTINEL;  Surgeon: Baylee Kevin MD;  Location: [address];  Service: General;  Laterality: Right;    TONSILLECTOMY N/A 1970    TUBAL  LIGATION         Review of patient's allergies indicates:   Allergen Reactions    Lortab [hydrocodone-acetaminophen] Itching    Promethazine Itching and Other (See Comments)       Current Facility-Administered Medications on File Prior to Encounter   Medication    albuterol sulfate nebulizer solution 2.5 mg     Current Outpatient Medications on File Prior to Encounter   Medication Sig    apixaban (ELIQUIS) 5 mg Tab Take 1 tablet (5 mg total) by mouth 2 (two) times daily.    EScitalopram oxalate (LEXAPRO) 20 MG tablet Take 1 tablet (20 mg total) by mouth once daily.    folic acid (FOLVITE) 1 MG tablet Take 1 tablet (1 mg total) by mouth once daily.    gabapentin (NEURONTIN) 300 MG capsule Take 1 capsule (300 mg total) by mouth 3 (three) times daily.    lancets Misc Use to check blood glucose 4 times daily    levothyroxine (SYNTHROID) 75 MCG tablet Take 1 tablet (75 mcg total) by mouth before breakfast.    metFORMIN (GLUCOPHAGE-XR) 500 MG ER 24hr tablet Take 500 mg by mouth 2 (two) times daily with meals.    metoprolol succinate (TOPROL-XL) 50 MG 24 hr tablet Take 1 tablet (50 mg total) by mouth once daily.    multivitamin Tab Take 1 tablet by mouth once daily.    naltrexone (DEPADE) 50 mg tablet Take 50 mg by mouth once daily.    nicotine (NICODERM CQ) 21 mg/24 hr Place 1 patch onto the skin once daily.    omeprazole (PRILOSEC) 20 MG capsule Take 1 capsule (20 mg total) by mouth once daily.    sucralfate (CARAFATE) 1 gram tablet Take 1 tablet (1 g total) by mouth 4 (four) times daily.    traZODone (DESYREL) 100 MG tablet Take 2 tablets (200 mg total) by mouth every evening.    [DISCONTINUED] omeprazole (PRILOSEC OTC) 20 MG tablet Take 20 mg by mouth once daily.     Family History       Problem Relation (Age of Onset)    Breast cancer Maternal Aunt, Daughter    Colon cancer Maternal Uncle    Diabetes Father, Mother    Heart attack Father    Hypertension Father, Mother          Tobacco Use    Smoking status: Every Day      Current packs/day: 0.00     Average packs/day: 0.5 packs/day for 30.0 years (15.0 ttl pk-yrs)     Types: Vaping with nicotine, Cigarettes     Start date: 2/1/1991     Last attempt to quit: 2/1/2021     Years since quitting: 3.1    Smokeless tobacco: Never    Tobacco comments:     Patient is currently smoking 10 cigarettes a day, declines nicotine patches   Substance and Sexual Activity    Alcohol use: Yes     Comment: vodka daily (half a regular bottle) for 4 days    Drug use: Yes     Types: Marijuana     Comment: gummies    Sexual activity: Yes     Birth control/protection: Surgical     Review of Systems   Constitutional:  Positive for appetite change and fatigue. Negative for activity change, chills, diaphoresis and fever.   Respiratory:  Negative for cough, chest tightness, shortness of breath and wheezing.    Cardiovascular:  Negative for chest pain and leg swelling.   Gastrointestinal:  Positive for nausea and vomiting. Negative for abdominal distention, abdominal pain, constipation and diarrhea.   Genitourinary:  Negative for decreased urine volume, difficulty urinating, dysuria and urgency.   Musculoskeletal:  Negative for arthralgias and myalgias.   Neurological:  Positive for tremors and headaches. Negative for dizziness, syncope, weakness and light-headedness.   Psychiatric/Behavioral:  Positive for sleep disturbance. Negative for self-injury and suicidal ideas. The patient is nervous/anxious.        Objective:     Vital Signs (Most Recent):  Temp: 98.3 °F (36.8 °C) (04/07/24 1401)  Pulse: 77 (04/07/24 1807)  Resp: 18 (04/07/24 1747)  BP: 136/63 (04/07/24 1702)  SpO2: 98 % (04/07/24 1808) Vital Signs (24h Range):  Temp:  [98.3 °F (36.8 °C)] 98.3 °F (36.8 °C)  Pulse:  [71-87] 77  Resp:  [18-20] 18  SpO2:  [91 %-98 %] 98 %  BP: (119-141)/(57-82) 136/63        There is no height or weight on file to calculate BMI.     Physical Exam  Vitals and nursing note reviewed.   Constitutional:       General: She is  not in acute distress.     Appearance: Normal appearance. She is well-developed and normal weight. She is not toxic-appearing.   HENT:      Head: Normocephalic and atraumatic.      Mouth/Throat:      Dentition: Normal dentition.   Eyes:      General: Lids are normal.      Extraocular Movements: Extraocular movements intact.      Conjunctiva/sclera: Conjunctivae normal.   Cardiovascular:      Rate and Rhythm: Normal rate and regular rhythm.      Heart sounds: Normal heart sounds. No murmur heard.  Pulmonary:      Effort: Pulmonary effort is normal.      Breath sounds: Normal breath sounds.   Chest:      Chest wall: No tenderness.   Abdominal:      General: Bowel sounds are normal.      Palpations: Abdomen is soft.   Musculoskeletal:      Cervical back: Neck supple.      Right lower leg: No edema.      Left lower leg: No edema.   Skin:     General: Skin is warm and dry.      Findings: No erythema or rash.   Neurological:      Mental Status: She is alert and oriented to person, place, and time.   Psychiatric:         Mood and Affect: Mood is anxious.             Significant Labs: All pertinent labs within the past 24 hours have been reviewed.  ABGs:   Recent Labs   Lab 04/07/24  1628   PH 7.222*   PCO2 43.4   HCO3 17.8*   POCSATURATED 75   BE -10*   PO2 48     CBC:   Recent Labs   Lab 04/07/24  1452 04/07/24  1628   WBC 34.02*  --    HGB 11.3*  --    HCT 39.4 34*     --      CMP:   Recent Labs   Lab 04/07/24  1452      K 4.6      CO2 12*   GLU 60*   BUN 12   CREATININE 0.9   CALCIUM 9.3   PROT 7.8   ALBUMIN 4.7   BILITOT 0.7   ALKPHOS 57   AST 34   ALT 22   ANIONGAP 26*     Magnesium:   Recent Labs   Lab 04/07/24  1452   MG 1.8     Urine Studies:   Recent Labs   Lab 04/07/24  1819   COLORU Yellow   APPEARANCEUA Clear   PHUR 6.0   SPECGRAV 1.015   PROTEINUA Trace*   GLUCUA 1+*   KETONESU 2+*   BILIRUBINUA Negative   OCCULTUA Negative   NITRITE Negative   UROBILINOGEN Negative   LEUKOCYTESUR Negative        Significant Imaging: I have reviewed all pertinent imaging results/findings within the past 24 hours.

## 2024-04-07 NOTE — ASSESSMENT & PLAN NOTE
-chronic, controlled on home metformin with recent HgA1C 6.0 however does trend of hypoglycemia with alcohol use/abuse  -at admission glucose 60  -hold home metformin  -NPO at current  -monitor accucheks per NPO protocol  -begin LDSSI if indicated  -dose/medication adjustment as appropriate   -PRN hypoglycemic protocol

## 2024-04-07 NOTE — ED NOTES
Pt aware she needs to give urine sample. Pt states she accidentally went to the restroom earlier without giving a sample.

## 2024-04-07 NOTE — HPI
"HPI by Rayne Alaniz NP:  Ms. Abdul is a 63 YOF with PMHx of severe EtOH use disorder with history of PRES, complicated withdrawal, and withdrawal seizures, anxiety/depression and history of suicide attempt, tobacco abuse, COPD, HTN, PAfib on OAC, DM type II on metformin, hypothyroidism, history of diverticulitis, PUD, chronic anemia, CLL not currently on any treatment, right breast cancer s/p bilateral mastectomy (2020), and history of sarcoidosis.     She has had "several hospital admissions for alcohol withdrawal, and has a hx of complicated withdrawal, including seizures, hallucinations. Has been to residential rehab several times, attended AA meetings, completed IOP in the past." Most recently in March 2024 was in admitted to inpatient rehab in [place] for 3 weeks.     She presents to the ED with complaints of N/V, headache, malaise, decreased PO intake,  inability to tolerate EtOH (last drink this morning); feeling impending withdrawal symptoms. She notes that she was in [place] at inpatient rehab for 3 weeks, recently discharged and was sober until this week. She has had increased life stressors as her and spouse have  and this prompted return to alcohol. She denies fever, chills, cough, SOB, BAL, CP, abdominal pain, diarrhea, constipation, urinary symptoms or hematuria, decreased UOP, changes in bowel habits or blood in stool, light headiness, dizziness, seizures, or syncope.     In the ED she is HDS and afebrile. CBC with WBC 34 (per chart review, variability significant with precipitous rise and declines), H/H 11.3/39.4, platelets 173. Chemistry with , K 4.6, chloride 100, CO2 12, anion gap 12, BUN 12, SCr 0.9, glucose 60. LFTs WNL. Magnesium 1.8. Beta hydrox 7.3. CPK 56. Serum alcohol 143. ABG with pH 7.222, PCO2 43.4, PO2 48, HCO3 17.8. POCT lactic 2.27. UA noninfectious, 1+ glucose, 2+ ketones. Flu and covid negative. Blood cultures in process. CXR with slight bilateral perihilar " opacity similar previous performed 03/06/2024. In the ED she was initiated on IVF hydration including banana bag, ABXs initiated until sepsis ruled out, and lorazepam administered.     The patient was admitted to the Hospital Medicine Service for further evaluation and management.

## 2024-04-07 NOTE — ED NOTES
Attempted to call report, was told that the patient had not yet been assigned to a nurse and they will call me back.

## 2024-04-07 NOTE — ASSESSMENT & PLAN NOTE
On continuous oral anticoagulation   -newly diagnosed, recent AFib with RVR episode during March hospitalization  -currently NSR  -continue home metoprolol  -continue tele monitoring  -EKG PRN concerns  -trend labs, address/replete electrolytes as indicated  -monitor

## 2024-04-07 NOTE — ED TRIAGE NOTES
Pt reports to ED with nausea, headache, and alcohol withdrawals. Pt reports her last drink was this morning. Currently c/o headache and nausea. No tremors noted. Pt states she has leukemia and is not receiving treatment at this time. Aaox4.

## 2024-04-07 NOTE — ASSESSMENT & PLAN NOTE
Leukocytosis  Anemia, chronic disease  -history of CLL, no current treatment regimen  -chronic anemia and leukocytosis (per chart review, variability significant with precipitous rise and declines)  -at admission afebrile, WBC 34, H/H 11.3/39.4, UA noninfectious, CXR without acute process  -s/p one time dose of ceftriaxone in ED  -no overt s/s of infection at current  -follow blood cultures and trend CBC over course  -resume ABXs if indicated  -monitor

## 2024-04-07 NOTE — ASSESSMENT & PLAN NOTE
-chronic, likely not entirely controlled  -continue home lexapro  -consider Psychiatry consult in AM   -monitor

## 2024-04-07 NOTE — ASSESSMENT & PLAN NOTE
Alcohol use disorder, severe, dependence  Alcohol withdrawal  History of substance abuse  Dehydration  -alcoholic ketoacidosis in setting of severe alcohol dependence with dehydration and concern for withdrawal  -last drink this morning, recently in inpatient treatment/rehab program in [place] in March 2024  -at admission HDS and afebrile; Chemistry with , K 4.6, chloride 100, CO2 12, anion gap 12, BUN 12, SCr 0.9, glucose 60. LFTs WNL. Magnesium 1.8. Beta hydrox 7.3. CPK 56. Serum alcohol 143. ABG with pH 7.222, PCO2 43.4, PO2 48, HCO3 17.8. POCT lactic 2.27. UA noninfectious, 1+ glucose, 2+ ketones.  -in the ED initiated on IVF hydration including banana bag and lorazepam administered  -continue IVF hydration  -trend VBG and BMP, address accordingly  -continue NPO status  -CIWA monitoring and PRN ativan ordered for withdrawal symptoms  -continue home folic acid, multivitamin, and trazodone  -begin thiamine PO  -hold recently prescribed naltrexone  -consider Psychiatry consult in AM   -fall and seizure precautions  -glucose monitoring and management as detailed below  -further PRN supportive care as indicated  -trend labs, address/replete electrolytes as indicated  -monitor

## 2024-04-08 VITALS
TEMPERATURE: 98 F | OXYGEN SATURATION: 96 % | BODY MASS INDEX: 28.81 KG/M2 | SYSTOLIC BLOOD PRESSURE: 132 MMHG | HEIGHT: 66 IN | DIASTOLIC BLOOD PRESSURE: 63 MMHG | RESPIRATION RATE: 14 BRPM | HEART RATE: 66 BPM | WEIGHT: 179.25 LBS

## 2024-04-08 LAB
ALBUMIN SERPL BCP-MCNC: 3.7 G/DL (ref 3.5–5.2)
ALP SERPL-CCNC: 41 U/L (ref 55–135)
ALT SERPL W/O P-5'-P-CCNC: 17 U/L (ref 10–44)
ANION GAP SERPL CALC-SCNC: 14 MMOL/L (ref 8–16)
ANISOCYTOSIS BLD QL SMEAR: SLIGHT
AST SERPL-CCNC: 29 U/L (ref 10–40)
BASOPHILS # BLD AUTO: ABNORMAL K/UL (ref 0–0.2)
BASOPHILS NFR BLD: 1 % (ref 0–1.9)
BILIRUB SERPL-MCNC: 0.5 MG/DL (ref 0.1–1)
BUN SERPL-MCNC: 7 MG/DL (ref 8–23)
CALCIUM SERPL-MCNC: 8.5 MG/DL (ref 8.7–10.5)
CHLORIDE SERPL-SCNC: 104 MMOL/L (ref 95–110)
CO2 SERPL-SCNC: 21 MMOL/L (ref 23–29)
CREAT SERPL-MCNC: 0.9 MG/DL (ref 0.5–1.4)
DIFFERENTIAL METHOD BLD: ABNORMAL
EOSINOPHIL # BLD AUTO: ABNORMAL K/UL (ref 0–0.5)
EOSINOPHIL NFR BLD: 0 % (ref 0–8)
ERYTHROCYTE [DISTWIDTH] IN BLOOD BY AUTOMATED COUNT: 18.6 % (ref 11.5–14.5)
EST. GFR  (NO RACE VARIABLE): >60 ML/MIN/1.73 M^2
GLUCOSE SERPL-MCNC: 82 MG/DL (ref 70–110)
HCT VFR BLD AUTO: 31.1 % (ref 37–48.5)
HGB BLD-MCNC: 9.3 G/DL (ref 12–16)
IMM GRANULOCYTES # BLD AUTO: ABNORMAL K/UL (ref 0–0.04)
IMM GRANULOCYTES NFR BLD AUTO: ABNORMAL % (ref 0–0.5)
LYMPHOCYTES # BLD AUTO: ABNORMAL K/UL (ref 1–4.8)
LYMPHOCYTES NFR BLD: 69 % (ref 18–48)
MAGNESIUM SERPL-MCNC: 2 MG/DL (ref 1.6–2.6)
MCH RBC QN AUTO: 25.3 PG (ref 27–31)
MCHC RBC AUTO-ENTMCNC: 29.9 G/DL (ref 32–36)
MCV RBC AUTO: 85 FL (ref 82–98)
MONOCYTES # BLD AUTO: ABNORMAL K/UL (ref 0.3–1)
MONOCYTES NFR BLD: 4 % (ref 4–15)
NEUTROPHILS NFR BLD: 26 % (ref 38–73)
NRBC BLD-RTO: 0 /100 WBC
PATH REV BLD -IMP: NORMAL
PHOSPHATE SERPL-MCNC: 2.2 MG/DL (ref 2.7–4.5)
PLATELET # BLD AUTO: 96 K/UL (ref 150–450)
PLATELET BLD QL SMEAR: ABNORMAL
PMV BLD AUTO: 9.3 FL (ref 9.2–12.9)
POCT GLUCOSE: 83 MG/DL (ref 70–110)
POLYCHROMASIA BLD QL SMEAR: ABNORMAL
POTASSIUM SERPL-SCNC: 4 MMOL/L (ref 3.5–5.1)
PROT SERPL-MCNC: 5.8 G/DL (ref 6–8.4)
RBC # BLD AUTO: 3.67 M/UL (ref 4–5.4)
SODIUM SERPL-SCNC: 139 MMOL/L (ref 136–145)
WBC # BLD AUTO: 6.6 K/UL (ref 3.9–12.7)

## 2024-04-08 PROCEDURE — 94761 N-INVAS EAR/PLS OXIMETRY MLT: CPT

## 2024-04-08 PROCEDURE — 80053 COMPREHEN METABOLIC PANEL: CPT | Performed by: NURSE PRACTITIONER

## 2024-04-08 PROCEDURE — 83735 ASSAY OF MAGNESIUM: CPT | Performed by: NURSE PRACTITIONER

## 2024-04-08 PROCEDURE — 85027 COMPLETE CBC AUTOMATED: CPT | Performed by: NURSE PRACTITIONER

## 2024-04-08 PROCEDURE — 84100 ASSAY OF PHOSPHORUS: CPT | Performed by: NURSE PRACTITIONER

## 2024-04-08 PROCEDURE — 36415 COLL VENOUS BLD VENIPUNCTURE: CPT | Performed by: NURSE PRACTITIONER

## 2024-04-08 PROCEDURE — 85007 BL SMEAR W/DIFF WBC COUNT: CPT | Performed by: NURSE PRACTITIONER

## 2024-04-08 PROCEDURE — 25000003 PHARM REV CODE 250: Performed by: NURSE PRACTITIONER

## 2024-04-08 RX ORDER — INSULIN ASPART 100 [IU]/ML
0-5 INJECTION, SOLUTION INTRAVENOUS; SUBCUTANEOUS
Status: DISCONTINUED | OUTPATIENT
Start: 2024-04-08 | End: 2024-04-08 | Stop reason: HOSPADM

## 2024-04-08 RX ADMIN — GABAPENTIN 300 MG: 300 CAPSULE ORAL at 08:04

## 2024-04-08 RX ADMIN — SUCRALFATE 1 G: 1 TABLET ORAL at 08:04

## 2024-04-08 RX ADMIN — ESCITALOPRAM OXALATE 20 MG: 10 TABLET ORAL at 08:04

## 2024-04-08 RX ADMIN — APIXABAN 5 MG: 2.5 TABLET, FILM COATED ORAL at 08:04

## 2024-04-08 RX ADMIN — METOPROLOL SUCCINATE 50 MG: 50 TABLET, EXTENDED RELEASE ORAL at 08:04

## 2024-04-08 RX ADMIN — FOLIC ACID 1 MG: 1 TABLET ORAL at 08:04

## 2024-04-08 RX ADMIN — DOCUSATE SODIUM 50MG AND SENNOSIDES 8.6MG 1 TABLET: 8.6; 5 TABLET, FILM COATED ORAL at 08:04

## 2024-04-08 RX ADMIN — THERA TABS 1 TABLET: TAB at 08:04

## 2024-04-08 RX ADMIN — ONDANSETRON 8 MG: 8 TABLET, ORALLY DISINTEGRATING ORAL at 04:04

## 2024-04-08 RX ADMIN — LEVOTHYROXINE SODIUM 75 MCG: 75 TABLET ORAL at 05:04

## 2024-04-08 RX ADMIN — Medication 100 MG: at 08:04

## 2024-04-08 RX ADMIN — PANTOPRAZOLE SODIUM 40 MG: 40 TABLET, DELAYED RELEASE ORAL at 08:04

## 2024-04-08 NOTE — HOSPITAL COURSE
Patient was admitted on IV fluids for alcoholic ketoacidosis and a [facility] protocol for alcohol withdrawal with prn benzodiazepines.  Labs were monitored.  She was given empiric antibiotics in the ED and further antibiotics held as she did not have evidence of sepsis other than the increased WBC, which was attributable to CLL.  WBC normalized completely at 6K the following morning, which is her usual pattern.    Patient was feeling well the morning after admission and her labs normalized.  She did not have an appetite, but requested to be discharged.  She says she started drinking again when her  left her, but she plans to follow up with her therapist and will not drink again.  She has an essential tremor which is at baseline but she had no other evidence of alcohol withdrawal, and the ketoacidosis resolved with IV fluids.  She was seen and examined on the day of discharge and felt comfortable being discharged home.

## 2024-04-08 NOTE — ASSESSMENT & PLAN NOTE
-chronic, controlled on home metformin with recent HgA1C 6.0 however does trend of hypoglycemia with alcohol use/abuse  -at admission glucose 60  -hold home metformin  -Resume diet  -Low dose SSI  -dose/medication adjustment as appropriate   -PRN hypoglycemic protocol

## 2024-04-08 NOTE — NURSING
Discharge instructions given to pt. Pt stated understood. IV remove telly remove. Transport notify pt needs a cab home.  
2022 16:20

## 2024-04-08 NOTE — ASSESSMENT & PLAN NOTE
"Alcohol use disorder, severe, dependence  Alcohol withdrawal  History of substance abuse  Dehydration  -alcoholic ketoacidosis in setting of severe alcohol dependence with dehydration and concern for withdrawal  -last drink this morning, recently in inpatient treatment/rehab program in [place] in March 2024  -at admission HDS and afebrile; Chemistry with , K 4.6, chloride 100, CO2 12, anion gap 12, BUN 12, SCr 0.9, glucose 60. LFTs WNL. Magnesium 1.8. Beta hydrox 7.3. CPK 56. Serum alcohol 143. ABG with pH 7.222, PCO2 43.4, PO2 48, HCO3 17.8. POCT lactic 2.27. UA noninfectious, 1+ glucose, 2+ ketones.  -in the ED initiated on IVF hydration including banana bag and lorazepam administered  -continue IVF hydration  -trend VBG and BMP, address accordingly  -CIWA monitoring and PRN ativan ordered for potential withdrawal symptoms - note her report of "withdrawal" is entirely subjective as she was intoxicated on presentation and had been abstinent while in rehab.  Symptoms more likely due to alcohol abuse and anxiety associated with that.  -continue home folic acid, multivitamin, and trazodone, start oral thiamine  -hold recently prescribed naltrexone  -fall and seizure precautions  -glucose monitoring and management as detailed below  -further PRN supportive care as indicated  -trend labs, address/replete electrolytes as indicated  -monitor   "

## 2024-04-08 NOTE — ASSESSMENT & PLAN NOTE
-chronic  -controlled  -continue home metoprolol  -dose/medication adjustment as appropriate   -monitor

## 2024-04-08 NOTE — PLAN OF CARE
Problem: Adult Inpatient Plan of Care  Goal: Plan of Care Review  Outcome: Met  Goal: Patient-Specific Goal (Individualized)  Outcome: Met  Goal: Absence of Hospital-Acquired Illness or Injury  Outcome: Met  Goal: Optimal Comfort and Wellbeing  Outcome: Met  Goal: Readiness for Transition of Care  Outcome: Met     Problem: Infection  Goal: Absence of Infection Signs and Symptoms  Outcome: Met     Problem: Diabetes Comorbidity  Goal: Blood Glucose Level Within Targeted Range  Outcome: Met     Problem: Fluid and Electrolyte Imbalance (Acute Kidney Injury/Impairment)  Goal: Fluid and Electrolyte Balance  Outcome: Met     Problem: Oral Intake Inadequate (Acute Kidney Injury/Impairment)  Goal: Optimal Nutrition Intake  Outcome: Met     Problem: Renal Function Impairment (Acute Kidney Injury/Impairment)  Goal: Effective Renal Function  Outcome: Met     Problem: Impaired Wound Healing  Goal: Optimal Wound Healing  Outcome: Met

## 2024-04-08 NOTE — H&P
"  [facility] Surg [address] Medicine  History & Physical    Patient Name: Earl Abdul  MRN: [id number]  Patient Class: IP- Inpatient  Admission Date: 4/7/2024  Attending Physician: Tara Moeller MD   Primary Care Provider: Andrew Rodriguez MD    Patient information was obtained from patient, past medical records, and ER records.     Subjective:     Principal Problem:Alcoholic ketoacidosis    Chief Complaint:   Chief Complaint   Patient presents with    Nausea     EMS activated for nausea x 1 day. Per EMS pt stated she is "going through withdrawals", last drink 1 hour ago. EMS reports pt 91% on RA        HPI: Ms. Abdul is a 63 YOF with PMHx of severe EtOH use disorder with history of PRES, complicated withdrawal, and withdrawal seizures, anxiety/depression and history of suicide attempt, tobacco abuse, COPD, HTN, PAfib on OAC, DM type II on metformin, hypothyroidism, history of diverticulitis, PUD, chronic anemia, CLL not currently on any treatment, right breast cancer s/p bilateral mastectomy (2020), and history of sarcoidosis.     She has had "several hospital admissions for alcohol withdrawal, and has a hx of complicated withdrawal, including seizures, hallucinations. Has been to residential rehab several times, attended AA meetings, completed IOP in the past." Most recently in March 2024 was in admitted to inpatient rehab in [place] for 3 weeks.     She presents to the ED with complaints of N/V, headache, malaise, decreased PO intake,  inability to tolerate EtOH (last drink this morning); feeling impending withdrawal symptoms. She notes that she was in [place] at inpatient rehab for 3 weeks, recently discharged and was sober until this week. She has had increased life stressors as her and spouse have  and this prompted return to alcohol. She denies fever, chills, cough, SOB, BAL, CP, abdominal pain, diarrhea, constipation, urinary symptoms or hematuria, decreased UOP, " changes in bowel habits or blood in stool, light headiness, dizziness, seizures, or syncope.     In the ED she is HDS and afebrile. CBC with WBC 34 (per chart review, variability significant with precipitous rise and declines), H/H 11.3/39.4, platelets 173. Chemistry with , K 4.6, chloride 100, CO2 12, anion gap 12, BUN 12, SCr 0.9, glucose 60. LFTs WNL. Magnesium 1.8. Beta hydrox 7.3. CPK 56. Serum alcohol 143. ABG with pH 7.222, PCO2 43.4, PO2 48, HCO3 17.8. POCT lactic 2.27. UA noninfectious, 1+ glucose, 2+ ketones. Flu and covid negative. Blood cultures in process. CXR with slight bilateral perihilar opacity similar previous performed 03/06/2024. In the ED she was initiated on IVF hydration including banana bag, ABXs initiated until sepsis ruled out, and lorazepam administered.     The patient was admitted to the Hospital Medicine Service for further evaluation and management.     Past Medical History:   Diagnosis Date    Alcoholism     c/b alcohol withdrawl seizures 7/2017    Anemia     Cancer of breast 10/2020    s/p bilateral mastectomy for  T1b N0 stage IA breast cancer October 2020    CLL (chronic lymphocytic leukemia) 09/30/2022 6/22/22 - PB flow cytometry  Immunophenotyping of peripheral blood detects a distinct kappa light chain restricted monoclonal B-cell population  (calculated at 2.44x10 9 /L, from the most recent CBC showing a total WBC of  7.35 K/uL with 61% total lymphocytes)  with a CLL phenotype (coexpression of CD19, CD5, CD23 and dim CD20). CD22 (FITC), FMC-7 and CD38 are negative in this population.    Controlled type 2 diabetes mellitus without complication, without long-term current use of insulin 11/30/2021    COPD (chronic obstructive pulmonary disease)     Depression     Diverticulitis     Fatty liver     GERD (gastroesophageal reflux disease)     Hyperlipidemia     Hypertension     Pancreatitis     Peptic ulcer disease     Polysubstance abuse     Posterior reversible  encephalopathy syndrome     Sarcoidosis of lung     over 30 yrs ago    Suicide attempt        Past Surgical History:   Procedure Laterality Date    APPENDECTOMY      BILATERAL MASTECTOMY Bilateral 10/29/2020    Procedure: MASTECTOMY, BILATERAL;  Surgeon: Baylee Kevin MD;  [facility];  Service: General;  Laterality: Bilateral;    BREAST REVISION SURGERY Bilateral 2/11/2021    Procedure: BREAST REVISION SURGERY;  Surgeon: Scottie Johnson MD;  [facility];  Service: Plastics;  Laterality: Bilateral;    COLONOSCOPY N/A 7/28/2017    Procedure: COLONOSCOPY;  Surgeon: Aaron Alvarado MD;  Location: [facility] [place] [facility]O;  Service: Endoscopy;  Laterality: N/A;    ESOPHAGOGASTRODUODENOSCOPY  10/7/2016, 11/6/2014    2016 - gastritis, duodenitis, 2014 erosive gastritis    ESOPHAGOGASTRODUODENOSCOPY N/A 2/11/2020    Procedure: ESOPHAGOGASTRODUODENOSCOPY (EGD);  Surgeon: Fawn Garrido MD;  Location: [facility];  Service: Endoscopy;  Laterality: N/A;    ESOPHAGOGASTRODUODENOSCOPY N/A 4/19/2021    Procedure: EGD (ESOPHAGOGASTRODUODENOSCOPY);  Surgeon: Paramjit Martino MD;  Location: [facility];  Service: Endoscopy;  Laterality: N/A;    FLEXIBLE SIGMOIDOSCOPY  11/06/2014    colitis    HYSTERECTOMY      IMPLANTATION OF PERMANENT SACRAL NERVE STIMULATOR N/A 7/12/2022    Procedure: INSERTION, NEUROSTIMULATOR, PERMANENT, SACRAL;  Surgeon: Juaquin Edwards MD;  Location: [facility] OR [address] [place] [address];  Service: Urology;  Laterality: N/A;  1hr    INJECTION FOR SENTINEL NODE IDENTIFICATION Right 10/29/2020    Procedure: INJECTION, FOR SENTINEL NODE IDENTIFICATION;  Surgeon: Baylee Kevin MD;  Location: [facility] OR [address] [place] [address];  Service: General;  Laterality: Right;    INJECTION OF JOINT Right 10/10/2019    Procedure: Injection, Joint RIGHT ILIOPSOAS BURSA/TENDON INJECTION AND RIGHT GLUTEAL TENDON INJECTION WITH STEROID AND LIDOCAINE;  Surgeon: Guillaume Rico MD;  Location: [facility] [place] [facility]IN MGT;  Service: Pain Management;  Laterality:  Right;  NEEDS CONSENT    INSERTION OF BREAST TISSUE EXPANDER Bilateral 10/29/2020    Procedure: INSERTION, TISSUE EXPANDER, BREAST;  Surgeon: Scottie Johnson MD;  [facility];  Service: Plastics;  Laterality: Bilateral;  Right breast: 1082 g  Left breast: 1076 g    LIPOSUCTION Bilateral 2/11/2021    Procedure: LIPOSUCTION;  Surgeon: Scottie Johnson MD;  [facility];  Service: Plastics;  Laterality: Bilateral;    mediastenoscopy      REPLACEMENT OF IMPLANT OF BREAST Bilateral 2/11/2021    Procedure: REPLACEMENT, IMPLANT, BREAST;  Surgeon: Scottie Johnson MD;  [facility];  Service: Plastics;  Laterality: Bilateral;    SENTINEL LYMPH NODE BIOPSY Right 10/29/2020    Procedure: BIOPSY, LYMPH NODE, SENTINEL;  Surgeon: Baylee Kevin MD;  Location: [facility] OR [address] [place] [address];  Service: General;  Laterality: Right;    TONSILLECTOMY N/A 1970    TUBAL LIGATION         Review of patient's allergies indicates:   Allergen Reactions    Lortab [hydrocodone-acetaminophen] Itching    Promethazine Itching and Other (See Comments)       Current Facility-Administered Medications on File Prior to Encounter   Medication    albuterol sulfate nebulizer solution 2.5 mg     Current Outpatient Medications on File Prior to Encounter   Medication Sig    apixaban (ELIQUIS) 5 mg Tab Take 1 tablet (5 mg total) by mouth 2 (two) times daily.    EScitalopram oxalate (LEXAPRO) 20 MG tablet Take 1 tablet (20 mg total) by mouth once daily.    folic acid (FOLVITE) 1 MG tablet Take 1 tablet (1 mg total) by mouth once daily.    gabapentin (NEURONTIN) 300 MG capsule Take 1 capsule (300 mg total) by mouth 3 (three) times daily.    lancets Misc Use to check blood glucose 4 times daily    levothyroxine (SYNTHROID) 75 MCG tablet Take 1 tablet (75 mcg total) by mouth before breakfast.    metFORMIN (GLUCOPHAGE-XR) 500 MG ER 24hr tablet Take 500 mg by mouth 2 (two) times daily with meals.    metoprolol  succinate (TOPROL-XL) 50 MG 24 hr tablet Take 1 tablet (50 mg total) by mouth once daily.    multivitamin Tab Take 1 tablet by mouth once daily.    naltrexone (DEPADE) 50 mg tablet Take 50 mg by mouth once daily.    nicotine (NICODERM CQ) 21 mg/24 hr Place 1 patch onto the skin once daily.    omeprazole (PRILOSEC) 20 MG capsule Take 1 capsule (20 mg total) by mouth once daily.    sucralfate (CARAFATE) 1 gram tablet Take 1 tablet (1 g total) by mouth 4 (four) times daily.    traZODone (DESYREL) 100 MG tablet Take 2 tablets (200 mg total) by mouth every evening.    [DISCONTINUED] omeprazole (PRILOSEC OTC) 20 MG tablet Take 20 mg by mouth once daily.     Family History       Problem Relation (Age of Onset)    Breast cancer Maternal Aunt, Daughter    Colon cancer Maternal Uncle    Diabetes Father, Mother    Heart attack Father    Hypertension Father, Mother          Tobacco Use    Smoking status: Every Day     Current packs/day: 0.00     Average packs/day: 0.5 packs/day for 30.0 years (15.0 ttl pk-yrs)     Types: Vaping with nicotine, Cigarettes     Start date: 2/1/1991     Last attempt to quit: 2/1/2021     Years since quitting: 3.1    Smokeless tobacco: Never    Tobacco comments:     Patient is currently smoking 10 cigarettes a day, declines nicotine patches   Substance and Sexual Activity    Alcohol use: Yes     Comment: vodka daily (half a regular bottle) for 4 days    Drug use: Yes     Types: Marijuana     Comment: gummies    Sexual activity: Yes     Birth control/protection: Surgical     Review of Systems   Constitutional:  Positive for appetite change and fatigue. Negative for activity change, chills, diaphoresis and fever.   Respiratory:  Negative for cough, chest tightness, shortness of breath and wheezing.    Cardiovascular:  Negative for chest pain and leg swelling.   Gastrointestinal:  Positive for nausea and vomiting. Negative for abdominal distention, abdominal pain, constipation and diarrhea.    Genitourinary:  Negative for decreased urine volume, difficulty urinating, dysuria and urgency.   Musculoskeletal:  Negative for arthralgias and myalgias.   Neurological:  Positive for tremors and headaches. Negative for dizziness, syncope, weakness and light-headedness.   Psychiatric/Behavioral:  Positive for sleep disturbance. Negative for self-injury and suicidal ideas. The patient is nervous/anxious.        Objective:     Vital Signs (Most Recent):  Temp: 98.3 °F (36.8 °C) (04/07/24 1401)  Pulse: 77 (04/07/24 1807)  Resp: 18 (04/07/24 1747)  BP: 136/63 (04/07/24 1702)  SpO2: 98 % (04/07/24 1808) Vital Signs (24h Range):  Temp:  [98.3 °F (36.8 °C)] 98.3 °F (36.8 °C)  Pulse:  [71-87] 77  Resp:  [18-20] 18  SpO2:  [91 %-98 %] 98 %  BP: (119-141)/(57-82) 136/63        There is no height or weight on file to calculate BMI.     Physical Exam  Vitals and nursing note reviewed.   Constitutional:       General: She is not in acute distress.     Appearance: Normal appearance. She is well-developed and normal weight. She is not toxic-appearing.   HENT:      Head: Normocephalic and atraumatic.      Mouth/Throat:      Dentition: Normal dentition.   Eyes:      General: Lids are normal.      Extraocular Movements: Extraocular movements intact.      Conjunctiva/sclera: Conjunctivae normal.   Cardiovascular:      Rate and Rhythm: Normal rate and regular rhythm.      Heart sounds: Normal heart sounds. No murmur heard.  Pulmonary:      Effort: Pulmonary effort is normal.      Breath sounds: Normal breath sounds.   Chest:      Chest wall: No tenderness.   Abdominal:      General: Bowel sounds are normal.      Palpations: Abdomen is soft.   Musculoskeletal:      Cervical back: Neck supple.      Right lower leg: No edema.      Left lower leg: No edema.   Skin:     General: Skin is warm and dry.      Findings: No erythema or rash.   Neurological:      Mental Status: She is alert and oriented to person, place, and time.    Psychiatric:         Mood and Affect: Mood is anxious.             Significant Labs: All pertinent labs within the past 24 hours have been reviewed.  ABGs:   Recent Labs   Lab 04/07/24  1628   PH 7.222*   PCO2 43.4   HCO3 17.8*   POCSATURATED 75   BE -10*   PO2 48     CBC:   Recent Labs   Lab 04/07/24  1452 04/07/24  1628   WBC 34.02*  --    HGB 11.3*  --    HCT 39.4 34*     --      CMP:   Recent Labs   Lab 04/07/24  1452      K 4.6      CO2 12*   GLU 60*   BUN 12   CREATININE 0.9   CALCIUM 9.3   PROT 7.8   ALBUMIN 4.7   BILITOT 0.7   ALKPHOS 57   AST 34   ALT 22   ANIONGAP 26*     Magnesium:   Recent Labs   Lab 04/07/24  1452   MG 1.8     Urine Studies:   Recent Labs   Lab 04/07/24  1819   COLORU Yellow   APPEARANCEUA Clear   PHUR 6.0   SPECGRAV 1.015   PROTEINUA Trace*   GLUCUA 1+*   KETONESU 2+*   BILIRUBINUA Negative   OCCULTUA Negative   NITRITE Negative   UROBILINOGEN Negative   LEUKOCYTESUR Negative       Significant Imaging: I have reviewed all pertinent imaging results/findings within the past 24 hours.  Assessment/Plan:     * Alcoholic ketoacidosis  Alcohol use disorder, severe, dependence  Alcohol withdrawal  History of substance abuse  Dehydration  -alcoholic ketoacidosis in setting of severe alcohol dependence with dehydration and concern for withdrawal  -last drink this morning, recently in inpatient treatment/rehab program in [place] in March 2024  -at admission HDS and afebrile; Chemistry with , K 4.6, chloride 100, CO2 12, anion gap 12, BUN 12, SCr 0.9, glucose 60. LFTs WNL. Magnesium 1.8. Beta hydrox 7.3. CPK 56. Serum alcohol 143. ABG with pH 7.222, PCO2 43.4, PO2 48, HCO3 17.8. POCT lactic 2.27. UA noninfectious, 1+ glucose, 2+ ketones.  -in the ED initiated on IVF hydration including banana bag and lorazepam administered  -continue IVF hydration  -trend VBG and BMP, address accordingly  -continue NPO status  -CIWA monitoring and PRN ativan ordered for withdrawal  symptoms  -continue home folic acid, multivitamin, and trazodone  -begin thiamine PO  -hold recently prescribed naltrexone  -consider Psychiatry consult in AM   -fall and seizure precautions  -glucose monitoring and management as detailed below  -further PRN supportive care as indicated  -trend labs, address/replete electrolytes as indicated  -monitor     Depression with anxiety  -chronic, likely not entirely controlled  -continue home lexapro  -consider Psychiatry consult in AM   -monitor     Essential hypertension  -chronic  -controlled  -continue home metoprolol  -dose/medication adjustment as appropriate   -monitor     Type 2 diabetes mellitus with hypoglycemia  -chronic, controlled on home metformin with recent HgA1C 6.0 however does trend of hypoglycemia with alcohol use/abuse  -at admission glucose 60  -hold home metformin  -NPO at current  -monitor accucheks per NPO protocol  -begin LDSSI if indicated  -dose/medication adjustment as appropriate   -PRN hypoglycemic protocol     Paroxysmal atrial fibrillation  On continuous oral anticoagulation   -newly diagnosed, recent AFib with RVR episode during March hospitalization  -currently NSR  -continue home metoprolol  -continue tele monitoring  -EKG PRN concerns  -trend labs, address/replete electrolytes as indicated  -monitor    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  Leukocytosis  Anemia, chronic disease  -history of CLL, no current treatment regimen  -chronic anemia and leukocytosis (per chart review, variability significant with precipitous rise and declines)  -at admission afebrile, WBC 34, H/H 11.3/39.4, UA noninfectious, CXR without acute process  -s/p one time dose of ceftriaxone in ED  -no overt s/s of infection at current  -follow blood cultures and trend CBC over course  -resume ABXs if indicated  -monitor     Hypothyroidism  -chronic  -continue home levothyroxine    Tobacco abuse  -chronic  -cessation  interest limited at current  -nicotine patch ordered  -monitor       VTE Risk Mitigation (From admission, onward)           Ordered     apixaban tablet 5 mg  2 times daily         04/07/24 1720     Reason for No Pharmacological VTE Prophylaxis  Once        Question:  Reasons:  Answer:  Already adequately anticoagulated on oral Anticoagulants    04/07/24 1720     IP VTE HIGH RISK PATIENT  Once         04/07/24 1720     Place sequential compression device  Until discontinued         04/07/24 1720                    Rayne Alaniz, RISHI, AG-ACNP, BC  Department of Hospital Medicine  [facility]

## 2024-04-08 NOTE — ASSESSMENT & PLAN NOTE
Leukocytosis  Anemia, chronic disease  -history of CLL, no current treatment regimen  -chronic anemia and leukocytosis (per chart review, variability significant with precipitous rise and declines)  -at admission afebrile, WBC 34, H/H 11.3/39.4, UA noninfectious, CXR without acute process  -s/p one time dose of ceftriaxone in ED  -no overt s/s of infection at current, will hold further antibiotics unless infection develops  -follow blood cultures and trend CBC over course  -monitor

## 2024-04-08 NOTE — ED PROVIDER NOTES
"Encounter Date: 4/7/2024       History     Chief Complaint   Patient presents with    Nausea     EMS activated for nausea x 1 day. Per EMS pt stated she is "going through withdrawals", last drink 1 hour ago. EMS reports pt 91% on RA     63-year-old female presenting to the emergency department complaining of nausea for the past day.  Patient has history of alcohol use disorder and drinks a bottle of vodka per day.  The patient reports that her  has recently left her.  She was recently hospitalized for depression and in rehabilitation.  She reports she has support from her sister via phone.  Patient reports she has been unable to drink much alcohol today due to nausea and vomiting.  Patient also reports generalized weakness.  Denies chest pain or shortness of breath.  No fever chills.  No diarrhea.  Patient denies any urinary symptoms.  She reports generalized weakness.  Denies being able to eat or drink for the past couple of days.    The history is provided by the patient.     Review of patient's allergies indicates:   Allergen Reactions    Lortab [hydrocodone-acetaminophen] Itching    Promethazine Itching and Other (See Comments)     Past Medical History:   Diagnosis Date    Alcoholism     c/b alcohol withdrawl seizures 7/2017    Anemia     Cancer of breast 10/2020    s/p bilateral mastectomy for  T1b N0 stage IA breast cancer October 2020    CLL (chronic lymphocytic leukemia) 09/30/2022 6/22/22 - PB flow cytometry  Immunophenotyping of peripheral blood detects a distinct kappa light chain restricted monoclonal B-cell population  (calculated at 2.44x10 9 /L, from the most recent CBC showing a total WBC of  7.35 K/uL with 61% total lymphocytes)  with a CLL phenotype (coexpression of CD19, CD5, CD23 and dim CD20). CD22 (FITC), FMC-7 and CD38 are negative in this population.    Controlled type 2 diabetes mellitus without complication, without long-term current use of insulin 11/30/2021    COPD (chronic " obstructive pulmonary disease)     Depression     Diverticulitis     Fatty liver     GERD (gastroesophageal reflux disease)     Hyperlipidemia     Hypertension     Pancreatitis     Peptic ulcer disease     Polysubstance abuse     Posterior reversible encephalopathy syndrome     Sarcoidosis of lung     over 30 yrs ago    Suicide attempt      Past Surgical History:   Procedure Laterality Date    APPENDECTOMY      BILATERAL MASTECTOMY Bilateral 10/29/2020    Procedure: MASTECTOMY, BILATERAL;  Surgeon: Baylee Kevin MD;  Location: [address];  Service: General;  Laterality: Bilateral;    BREAST REVISION SURGERY Bilateral 2/11/2021    Procedure: BREAST REVISION SURGERY;  Surgeon: Scottie Johnson MD;  Location: [address];  Service: Plastics;  Laterality: Bilateral;    COLONOSCOPY N/A 7/28/2017    Procedure: COLONOSCOPY;  Surgeon: Aaron Alvarado MD;  Location: [facility];  Service: Endoscopy;  Laterality: N/A;    ESOPHAGOGASTRODUODENOSCOPY  10/7/2016, 11/6/2014    2016 - gastritis, duodenitis, 2014 erosive gastritis    ESOPHAGOGASTRODUODENOSCOPY N/A 2/11/2020    Procedure: ESOPHAGOGASTRODUODENOSCOPY (EGD);  Surgeon: Fawn Garrido MD;  Location: [facility];  Service: Endoscopy;  Laterality: N/A;    ESOPHAGOGASTRODUODENOSCOPY N/A 4/19/2021    Procedure: EGD (ESOPHAGOGASTRODUODENOSCOPY);  Surgeon: Paramjit Martino MD;  Location: [facility];  Service: Endoscopy;  Laterality: N/A;    FLEXIBLE SIGMOIDOSCOPY  11/06/2014    colitis    HYSTERECTOMY      IMPLANTATION OF PERMANENT SACRAL NERVE STIMULATOR N/A 7/12/2022    Procedure: INSERTION, NEUROSTIMULATOR, PERMANENT, SACRAL;  Surgeon: Juaquin Edwards MD;  Location: [address];  Service: Urology;  Laterality: N/A;  1hr    INJECTION FOR SENTINEL NODE IDENTIFICATION Right 10/29/2020    Procedure: INJECTION, FOR SENTINEL NODE IDENTIFICATION;  Surgeon: Baylee Kevin MD;  Location: [address];  Service: General;  Laterality: Right;    INJECTION OF  JOINT Right 10/10/2019    Procedure: Injection, Joint RIGHT ILIOPSOAS BURSA/TENDON INJECTION AND RIGHT GLUTEAL TENDON INJECTION WITH STEROID AND LIDOCAINE;  Surgeon: Guillaume Rico MD;  Location: [facility];  Service: Pain Management;  Laterality: Right;  NEEDS CONSENT    INSERTION OF BREAST TISSUE EXPANDER Bilateral 10/29/2020    Procedure: INSERTION, TISSUE EXPANDER, BREAST;  Surgeon: Scottie Johnson MD;  Location: [address];  Service: Plastics;  Laterality: Bilateral;  Right breast: 1082 g  Left breast: 1076 g    LIPOSUCTION Bilateral 2/11/2021    Procedure: LIPOSUCTION;  Surgeon: Scottie Johnson MD;  Location: [facility] OR [address] [place] [address];  Service: Plastics;  Laterality: Bilateral;    mediastenoscopy      REPLACEMENT OF IMPLANT OF BREAST Bilateral 2/11/2021    Procedure: REPLACEMENT, IMPLANT, BREAST;  Surgeon: Scottie Johnson MD;  Location: [facility] OR [address] [place] [address];  Service: Plastics;  Laterality: Bilateral;    SENTINEL LYMPH NODE BIOPSY Right 10/29/2020    Procedure: BIOPSY, LYMPH NODE, SENTINEL;  Surgeon: Baylee Kevin MD;  Location: [address];  Service: General;  Laterality: Right;    TONSILLECTOMY N/A 1970    TUBAL LIGATION       Family History   Problem Relation Age of Onset    Heart attack Father     Diabetes Father     Hypertension Father     Diabetes Mother     Hypertension Mother     Breast cancer Maternal Aunt     Colon cancer Maternal Uncle     Breast cancer Daughter     Ovarian cancer Neg Hx     Cancer Neg Hx      Social History     Tobacco Use    Smoking status: Every Day     Current packs/day: 0.00     Average packs/day: 0.5 packs/day for 30.0 years (15.0 ttl pk-yrs)     Types: Vaping with nicotine, Cigarettes     Start date: 2/1/1991     Last attempt to quit: 2/1/2021     Years since quitting: 3.1    Smokeless tobacco: Never    Tobacco comments:     Patient is currently smoking 10 cigarettes a day, declines nicotine patches   Substance Use Topics    Alcohol use: Yes     Comment:  vodka daily (half a regular bottle) for 4 days    Drug use: Yes     Types: Marijuana     Comment: gummies     Review of Systems   Constitutional:  Positive for activity change, appetite change and fatigue. Negative for fever.   HENT:  Negative for sore throat.    Respiratory:  Negative for shortness of breath.    Cardiovascular:  Negative for chest pain.   Gastrointestinal:  Positive for nausea and vomiting. Negative for abdominal pain.   Genitourinary:  Negative for dysuria.   Musculoskeletal:  Negative for back pain.   Skin:  Negative for color change, rash and wound.   Neurological:  Positive for weakness.   Hematological:  Does not bruise/bleed easily.   All other systems reviewed and are negative.      Physical Exam     Initial Vitals [04/07/24 1401]   BP Pulse Resp Temp SpO2   (!) 140/82 82 20 98.3 °F (36.8 °C) (!) 91 %      MAP       --         Physical Exam    Nursing note and vitals reviewed.  Constitutional: She appears well-developed and well-nourished. No distress.   Ill-appearing, nontoxic   HENT:   Head: Normocephalic and atraumatic.   Right Ear: External ear normal.   Left Ear: External ear normal.   No head trauma noted.  Dry oral mucosa.     Eyes: Conjunctivae and EOM are normal. Pupils are equal, round, and reactive to light. Right eye exhibits no discharge. Left eye exhibits no discharge. No scleral icterus.   Neck: Neck supple.   Normal range of motion.  Cardiovascular:  Normal rate, regular rhythm and normal heart sounds.     Exam reveals no gallop and no friction rub.       No murmur heard.  Pulmonary/Chest: Breath sounds normal. No stridor. No respiratory distress. She has no wheezes. She has no rhonchi. She has no rales.   Abdominal: Abdomen is soft. Bowel sounds are normal. She exhibits no distension and no mass. There is no abdominal tenderness. There is no rebound and no guarding.   Musculoskeletal:         General: No edema. Normal range of motion.      Cervical back: Normal range of  motion and neck supple.      Comments: No tremor noted     Neurological: She is alert and oriented to person, place, and time. She has normal strength. No cranial nerve deficit or sensory deficit. GCS score is 15. GCS eye subscore is 4. GCS verbal subscore is 5. GCS motor subscore is 6.   Skin: Skin is warm and dry. Capillary refill takes less than 2 seconds.   Scattered abrasions and contusions of lower extremities   Psychiatric: She has a normal mood and affect. Her behavior is normal. Judgment and thought content normal.         ED Course   Procedures  Labs Reviewed   CBC W/ AUTO DIFFERENTIAL - Abnormal; Notable for the following components:       Result Value    WBC 34.02 (*)     Hemoglobin 11.3 (*)     MCH 24.8 (*)     MCHC 28.7 (*)     RDW 18.9 (*)     Gran % 26.0 (*)     Lymph % 71.0 (*)     Mono % 3.0 (*)     All other components within normal limits   COMPREHENSIVE METABOLIC PANEL - Abnormal; Notable for the following components:    CO2 12 (*)     Glucose 60 (*)     Anion Gap 26 (*)     All other components within normal limits   BETA - HYDROXYBUTYRATE, SERUM - Abnormal; Notable for the following components:    Beta-Hydroxybutyrate 7.3 (*)     All other components within normal limits   ALCOHOL,MEDICAL (ETHANOL) - Abnormal; Notable for the following components:    Alcohol, Serum 143 (*)     All other components within normal limits   URINALYSIS, REFLEX TO URINE CULTURE - Abnormal; Notable for the following components:    Protein, UA Trace (*)     Glucose, UA 1+ (*)     Ketones, UA 2+ (*)     All other components within normal limits    Narrative:     Specimen Source->Urine   POCT GLUCOSE - Abnormal; Notable for the following components:    POCT Glucose 284 (*)     All other components within normal limits   ISTAT LACTATE - Abnormal; Notable for the following components:    POC Lactate 2.21 (*)     All other components within normal limits   ISTAT PROCEDURE - Abnormal; Notable for the following components:     POC PH 7.222 (*)     POC HCO3 17.8 (*)     POC BE -10 (*)     POC TCO2 19 (*)     POC Hematocrit 34 (*)     All other components within normal limits   POCT GLUCOSE - Abnormal; Notable for the following components:    POCT Glucose 196 (*)     All other components within normal limits   CK   MAGNESIUM   PROCALCITONIN   POCT INFLUENZA A/B MOLECULAR   SARS-COV-2 RDRP GENE   POCT GLUCOSE MONITORING CONTINUOUS     EKG Readings: (Independently Interpreted)   Initial Reading: No STEMI. Rhythm: Normal Sinus Rhythm.       Imaging Results              X-Ray Chest AP Portable (Final result)  Result time 04/07/24 15:50:43      Final result by Salbador Mancuso MD (04/07/24 15:50:43)                   Impression:      As above.      Electronically signed by: Salbador Mancuso MD  Date:    04/07/2024  Time:    15:50               Narrative:    EXAMINATION:  XR CHEST AP PORTABLE    CLINICAL HISTORY:  Sepsis;    TECHNIQUE:  Single frontal view of the chest was performed.    FINDINGS:  There is slight bilateral perihilar opacity similar previous performed 03/06/2024.  There is no pneumothorax or pleural fluid.  The cardiac silhouette appears mildly enlarged.  There is calcification of the aorta.  The osseous structures demonstrate degenerative change.                                    X-Rays:   Independently Interpreted Readings:   Chest X-Ray: Perihilar opacification similar to previous     Medications   apixaban tablet 5 mg (5 mg Oral Given 4/7/24 2033)   EScitalopram oxalate tablet 20 mg (has no administration in time range)   folic acid tablet 1 mg (has no administration in time range)   gabapentin capsule 300 mg (300 mg Oral Given 4/7/24 2033)   levothyroxine tablet 75 mcg (has no administration in time range)   metoprolol succinate (TOPROL-XL) 24 hr tablet 50 mg (has no administration in time range)   multivitamin tablet (has no administration in time range)   nicotine 21 mg/24 hr 1 patch (has no administration in time range)    pantoprazole EC tablet 40 mg (has no administration in time range)   sucralfate tablet 1 g (1 g Oral Given 4/7/24 2033)   traZODone tablet 200 mg (200 mg Oral Given 4/7/24 2033)   sodium chloride 0.9% flush 10 mL (has no administration in time range)   acetaminophen tablet 650 mg (650 mg Oral Given 4/7/24 2040)   traMADoL tablet 50 mg (has no administration in time range)   senna-docusate 8.6-50 mg per tablet 1 tablet (1 tablet Oral Not Given 4/7/24 2100)   polyethylene glycol packet 17 g (has no administration in time range)   ondansetron disintegrating tablet 8 mg (has no administration in time range)   ondansetron injection 4 mg (has no administration in time range)   lactated ringers infusion (0 mL/hr Intravenous Hold 4/7/24 1830)   LORazepam tablet 2 mg (2 mg Oral Given 4/7/24 1823)   LORazepam injection 2 mg (has no administration in time range)   thiamine tablet 100 mg (has no administration in time range)   glucagon (human recombinant) injection 1 mg (has no administration in time range)   dextrose 10% bolus 125 mL 125 mL (has no administration in time range)   dextrose 10% bolus 250 mL 250 mL (has no administration in time range)   LORazepam injection 1 mg (1 mg Intravenous Given 4/7/24 1507)   dextrose 5 % and 0.45 % NaCl infusion (0 mLs Intravenous Stopped 4/7/24 1612)   cefTRIAXone (ROCEPHIN) 2 g in dextrose 5 % in water (D5W) 100 mL IVPB (MB+) (0 g Intravenous Stopped 4/7/24 1731)   sodium chloride 0.9% 1,000 mL with mvi, (ADULT) no.4 with vit K 3,300 unit- 150 mcg/10 mL 10 mL, thiamine 100 mg, folic acid 1 mg infusion ( Intravenous New Bag 4/7/24 1730)   magnesium sulfate 2g in water 50mL IVPB (premix) (0 g Intravenous Stopped 4/7/24 1951)     Medical Decision Making  63-year-old female with history of alcohol use disorder presents with complaint of nausea vomiting weakness.  Prior presentations have been consistent with findings of metabolic acidosis and alcohol starvation ketoacidosis.  Patient  also has prior history of rhabdomyolysis and renal failure.  Plan labs, IV fluids, Ativan.  Anticipate admission.      Amount and/or Complexity of Data Reviewed  Labs: ordered.  Radiology: ordered.  Discussion of management or test interpretation with external provider(s): Patient noted to be hypoglycemic.  Given oral juice and placed on dextrose solution.  Patient did recently receive thiamine.  Discussed patient with Dr. Hubbard from [facility], patient meets inpatient will be admitted for IV fluids, treatment for anticipated alcohol withdrawal, electrolyte correction.  Patient is given additional thiamine and multivitamin.  Patient also noted to have elevated white blood cell count.  This is consistent with prior presentation.  No focal signs or symptoms of infection.  Will obtain lactate and cultures and give 1 dose of antibiotics.     Risk  Prescription drug management.  Decision regarding hospitalization.                                      Clinical Impression:  Final diagnoses:  [E87.20] Metabolic acidosis  [F10.20] Alcohol use disorder, severe, dependence (Primary)  [D72.829] Leukocytosis, unspecified type          ED Disposition Condition    Admit Stable                Erlinda Metcalf MD  04/07/24 [id number]

## 2024-04-08 NOTE — PLAN OF CARE
Patient will discharge home. Appointments have been made and added to AVS. Patient will need ride home. All CM needs have been made.    04/08/24 0924   Final Note   Assessment Type Final Discharge Note   Anticipated Discharge Disposition Home   Hospital Resources/Appts/Education Provided Provided patient/caregiver with written discharge plan information;Appointments scheduled and added to AVS   Post-Acute Status   Discharge Delays None known at this time     [facility] ([address])  Discharge Final Note    Primary Care Provider: Andrew Rodriguez MD    Expected Discharge Date: 4/8/2024    Final Discharge Note (most recent)       Final Note - 04/08/24 0924          Final Note    Assessment Type Final Discharge Note (P)      Anticipated Discharge Disposition Home or Self Care (P)      Hospital Resources/Appts/Education Provided Provided patient/caregiver with written discharge plan information;Appointments scheduled and added to AVS (P)         Post-Acute Status    Discharge Delays None known at this time (P)                      Important Message from Medicare             Contact Info       Andrew Rodriguez MD   Specialty: Internal Medicine   Relationship: PCP - General    [facility] [address] [address]   Phone: [phone]       Next Steps: Follow up    Instructions: Follow up in 1-2 weeks

## 2024-04-08 NOTE — PLAN OF CARE
Patient lives independently with spouse. Patient will need ride home at discharge.    04/08/24 0901   Discharge Assessment   Assessment Type Discharge Planning Assessment   Confirmed/corrected address, phone number and insurance Yes   Confirmed Demographics Correct on Facesheet   Source of Information patient   Communicated BECCA with patient/caregiver Date not available/Unable to determine   People in Home spouse   Do you expect to return to your current living situation? Yes   Prior to hospitilization cognitive status: Alert/Oriented   Current cognitive status: Alert/Oriented   Equipment Currently Used at Home oxygen   Readmission within 30 days? No   Patient currently being followed by outpatient case management? No   Do you currently have service(s) that help you manage your care at home? No   Do you take prescription medications? Yes   Do you have any problems affording any of your prescribed medications? No   Is the patient taking medications as prescribed? yes   How do you get to doctors appointments? car, drives self   Are you on dialysis? No   Do you take coumadin? No   Discharge Plan A Home with family   DME Needed Upon Discharge  none   Discharge Plan discussed with: Patient   Transition of Care Barriers None   Physical Activity   On average, how many days per week do you engage in moderate to strenuous exercise (like a brisk walk)? 7 days   On average, how many minutes do you engage in exercise at this level? 40 min   Financial Resource Strain   How hard is it for you to pay for the very basics like food, housing, medical care, and heating? Not hard   Housing Stability   In the last 12 months, was there a time when you were not able to pay the mortgage or rent on time? N   In the last 12 months, how many places have you lived? 1   In the last 12 months, was there a time when you did not have a steady place to sleep or slept in a shelter (including now)? N   Transportation Needs   In the past 12 months, has  lack of transportation kept you from medical appointments or from getting medications? no   In the past 12 months, has lack of transportation kept you from meetings, work, or from getting things needed for daily living? No   Food Insecurity   Within the past 12 months, you worried that your food would run out before you got the money to buy more. Never true   Within the past 12 months, the food you bought just didn't last and you didn't have money to get more. Never true   Stress   Do you feel stress - tense, restless, nervous, or anxious, or unable to sleep at night because your mind is troubled all the time - these days? To some exte   Social Connections   In a typical week, how many times do you talk on the phone with family, friends, or neighbors? More than 3   How often do you get together with friends or relatives? Once   How often do you attend Restoration or Mandaen services? Never   Do you belong to any clubs or organizations such as Restoration groups, unions, fraternal or athletic groups, or school groups? No   How often do you attend meetings of the clubs or organizations you belong to? Pt Declined   Are you , , , , never , or living with a partner?    Alcohol Use   Q1: How often do you have a drink containing alcohol? 4 or more ti   Q2: How many drinks containing alcohol do you have on a typical day when you are drinking? 5 or 6   Q3: How often do you have six or more drinks on one occasion? Never   OTHER   Name(s) of People in Home Henrique Abdul ()     [facility] ([address])  Initial Discharge Assessment       Primary Care Provider: Andrew Rodriguez MD    Admission Diagnosis: Metabolic acidosis [E87.20]    Admission Date: 4/7/2024  Expected Discharge Date: 4/8/2024    Transition of Care Barriers: (P) None    Payor: [facility] [employer] / Plan: [facility] CHOICE PLUS / Product Type: Commercial /     Extended Emergency Contact Information  Primary Emergency  Contact: Henrique Abdul  Address: [address] [place] [address] [place] [address] [facility]  Home Phone: [phone]  Relation: Spouse   needed? No  Secondary Emergency Contact: Carlos Suazo  Mobile Phone: [phone]  Relation: Son    Discharge Plan A: (P) Home with family         [id number] - [place], [place] - [address] [facility]  [address]  [address] [address]  [place] [address]  Phone: [phone] Fax: [phone]      Initial Assessment (most recent)       Adult Discharge Assessment - 04/08/24 0901          Discharge Assessment    Assessment Type Discharge Planning Assessment (P)      Confirmed/corrected address, phone number and insurance Yes (P)      Confirmed Demographics Correct on Facesheet (P)      Source of Information patient (P)      Communicated BECCA with patient/caregiver Date not available/Unable to determine (P)      People in Home spouse (P)      Do you expect to return to your current living situation? Yes (P)      Prior to hospitilization cognitive status: Alert/Oriented (P)      Current cognitive status: Alert/Oriented (P)      Equipment Currently Used at Home oxygen (P)      Readmission within 30 days? No (P)      Patient currently being followed by outpatient case management? No (P)      Do you currently have service(s) that help you manage your care at home? No (P)      Do you take prescription medications? Yes (P)      Do you have any problems affording any of your prescribed medications? No (P)      Is the patient taking medications as prescribed? yes (P)      How do you get to doctors appointments? car, drives self (P)      Are you on dialysis? No (P)      Do you take coumadin? No (P)      Discharge Plan A Home with family (P)      DME Needed Upon Discharge  none (P)      Discharge Plan discussed with: Patient (P)      Transition of Care Barriers None (P)         Physical Activity    On average, how many days per week  do you engage in moderate to strenuous exercise (like a brisk walk)? 7 days (P)      On average, how many minutes do you engage in exercise at this level? 40 min (P)         Financial Resource Strain    How hard is it for you to pay for the very basics like food, housing, medical care, and heating? Not hard at all (P)         Housing Stability    In the last 12 months, was there a time when you were not able to pay the mortgage or rent on time? No (P)      In the last 12 months, how many places have you lived? 1 (P)      In the last 12 months, was there a time when you did not have a steady place to sleep or slept in a shelter (including now)? No (P)         Transportation Needs    In the past 12 months, has lack of transportation kept you from medical appointments or from getting medications? No (P)      In the past 12 months, has lack of transportation kept you from meetings, work, or from getting things needed for daily living? No (P)         Food Insecurity    Within the past 12 months, you worried that your food would run out before you got the money to buy more. Never true (P)      Within the past 12 months, the food you bought just didn't last and you didn't have money to get more. Never true (P)         Stress    Do you feel stress - tense, restless, nervous, or anxious, or unable to sleep at night because your mind is troubled all the time - these days? To some extent (P)         Social Connections    In a typical week, how many times do you talk on the phone with family, friends, or neighbors? More than three times a week (P)      How often do you get together with friends or relatives? Once a week (P)      How often do you attend Buddhism or Yazidism services? Never (P)      Do you belong to any clubs or organizations such as Buddhism groups, unions, fraternal or athletic groups, or school groups? No (P)      How often do you attend meetings of the clubs or organizations you belong to? Patient declined (P)       Are you , , , , never , or living with a partner?  (P)         Alcohol Use    Q1: How often do you have a drink containing alcohol? 4 or more times a week (P)      Q2: How many drinks containing alcohol do you have on a typical day when you are drinking? 5 or 6 (P)      Q3: How often do you have six or more drinks on one occasion? Never (P)         OTHER    Name(s) of People in Home Henrique Abdul () (P)

## 2024-04-08 NOTE — PLAN OF CARE
Problem: Adult Inpatient Plan of Care  Goal: Plan of Care Review  Outcome: Ongoing, Progressing  Goal: Patient-Specific Goal (Individualized)  Outcome: Ongoing, Progressing  Goal: Absence of Hospital-Acquired Illness or Injury  Outcome: Ongoing, Progressing  Goal: Optimal Comfort and Wellbeing  Outcome: Ongoing, Progressing     Problem: Infection  Goal: Absence of Infection Signs and Symptoms  Outcome: Ongoing, Progressing     Problem: Diabetes Comorbidity  Goal: Blood Glucose Level Within Targeted Range  Outcome: Ongoing, Progressing     Problem: Oral Intake Inadequate (Acute Kidney Injury/Impairment)  Goal: Optimal Nutrition Intake  Outcome: Ongoing, Progressing     POC reviewed with patient. All questions and concerns addressed. Fall/safety precautions implemented and maintained. Banana bag completed this shift. IVF initiated and continued. Blood glucose monitored. CIWA q8h. No acute events noted this shift. Please see flowsheet for full assessment and vitals. Bed locked in lowest position. Side rails up x2. Call bell within reach.

## 2024-04-08 NOTE — DISCHARGE SUMMARY
"[facility] ([address] Medicine  Discharge Summary      Patient Name: Earl Abdul  MRN: [id number]  IZA: [id number]  Patient Class: IP- Inpatient  Admission Date: 4/7/2024  Hospital Length of Stay: 1 days  Discharge Date and Time:  04/08/2024 8:47 AM  Attending Physician: Tara Moeller MD   Discharging Provider: Tara Moeller MD  Primary Care Provider: Andrew Rodriguez MD    Primary Care Team: Networked reference to record PCT     HPI:   HPI by Rayne Alaniz NP:  Ms. Abdul is a 63 YOF with PMHx of severe EtOH use disorder with history of PRES, complicated withdrawal, and withdrawal seizures, anxiety/depression and history of suicide attempt, tobacco abuse, COPD, HTN, PAfib on OAC, DM type II on metformin, hypothyroidism, history of diverticulitis, PUD, chronic anemia, CLL not currently on any treatment, right breast cancer s/p bilateral mastectomy (2020), and history of sarcoidosis.     She has had "several hospital admissions for alcohol withdrawal, and has a hx of complicated withdrawal, including seizures, hallucinations. Has been to residential rehab several times, attended AA meetings, completed IOP in the past." Most recently in March 2024 was in admitted to inpatient rehab in [place] for 3 weeks.     She presents to the ED with complaints of N/V, headache, malaise, decreased PO intake,  inability to tolerate EtOH (last drink this morning); feeling impending withdrawal symptoms. She notes that she was in [place] at inpatient rehab for 3 weeks, recently discharged and was sober until this week. She has had increased life stressors as her and spouse have  and this prompted return to alcohol. She denies fever, chills, cough, SOB, BAL, CP, abdominal pain, diarrhea, constipation, urinary symptoms or hematuria, decreased UOP, changes in bowel habits or blood in stool, light headiness, dizziness, seizures, or syncope.     In the ED she is HDS and afebrile. CBC with " WBC 34 (per chart review, variability significant with precipitous rise and declines), H/H 11.3/39.4, platelets 173. Chemistry with , K 4.6, chloride 100, CO2 12, anion gap 12, BUN 12, SCr 0.9, glucose 60. LFTs WNL. Magnesium 1.8. Beta hydrox 7.3. CPK 56. Serum alcohol 143. ABG with pH 7.222, PCO2 43.4, PO2 48, HCO3 17.8. POCT lactic 2.27. UA noninfectious, 1+ glucose, 2+ ketones. Flu and covid negative. Blood cultures in process. CXR with slight bilateral perihilar opacity similar previous performed 03/06/2024. In the ED she was initiated on IVF hydration including banana bag, ABXs initiated until sepsis ruled out, and lorazepam administered.     The patient was admitted to the Hospital Medicine Service for further evaluation and management.           Hospital Course:   Patient was admitted on IV fluids for alcoholic ketoacidosis and a [facility] protocol for alcohol withdrawal with prn benzodiazepines.  Labs were monitored.  She was given empiric antibiotics in the ED and further antibiotics held as she did not have evidence of sepsis other than the increased WBC, which was attributable to CLL.  WBC normalized completely at 6K the following morning, which is her usual pattern.    Patient was feeling well the morning after admission and her labs normalized.  She did not have an appetite, but requested to be discharged.  She says she started drinking again when her  left her, but she plans to follow up with her therapist and will not drink again.  She has an essential tremor which is at baseline but she had no other evidence of alcohol withdrawal, and the ketoacidosis resolved with IV fluids.  She was seen and examined on the day of discharge and felt comfortable being discharged home.     Goals of Care Treatment Preferences:  Code Status: Full Code    Health care agent: Spouse is NOK  Health care agent number: No value filed.          What is most important right now is to focus on curative/life-prolongation  (regardless of treatment burdens).  Accordingly, we have decided that the best plan to meet the patient's goals includes continuing with treatment.      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 [D72.820, C91.10] 09/27/2022 Yes    Type 2 diabetes mellitus with hypoglycemia [E11.649] 11/30/2021 Yes    Hypothyroidism [E03.9] 11/30/2021 Yes    Depression with anxiety [F41.8] 08/16/2017 Yes    Dehydration [E86.0] 10/15/2014 Yes    Alcohol use disorder, severe, dependence [F10.20] 02/07/2014 Yes    Essential hypertension [I10] 02/07/2014 Yes     Chronic    Alcohol withdrawal [F10.939] 02/07/2014 Yes    Tobacco abuse [Z72.0] 02/07/2014 Yes     Chronic      Problems Resolved During this Admission:       Discharged Condition: stable    Disposition: Home or Self Care    Follow Up:   Follow-up Information       Andrew Rordiguez MD Follow up.    Specialty: Internal Medicine  Why: Follow up in 1-2 weeks  Contact information:  [address] [address]  [phone]                           Patient Instructions:      Diet Adult Regular     Reason for not Ordering Smoking Cessation Referral     Order Specific Question Answer Comments   Reason for not ordering: Not medically appropriate at this time      Activity as tolerated         Medications:  Reconciled Home Medications:      Medication List        CONTINUE taking these medications      ACCU-CHEK SOFTCLIX LANCETS Misc  Generic drug: lancets  Use to check blood glucose 4 times daily     ELIQUIS 5 mg Tab  Generic drug: apixaban  Take 1 tablet (5 mg total) by mouth 2 (two) times daily.     EScitalopram oxalate 20 MG  tablet  Commonly known as: LEXAPRO  Take 1 tablet (20 mg total) by mouth once daily.     folic acid 1 MG tablet  Commonly known as: FOLVITE  Take 1 tablet (1 mg total) by mouth once daily.     gabapentin 300 MG capsule  Commonly known as: NEURONTIN  Take 1 capsule (300 mg total) by mouth 3 (three) times daily.     levothyroxine 75 MCG tablet  Commonly known as: SYNTHROID  Take 1 tablet (75 mcg total) by mouth before breakfast.     metFORMIN 500 MG ER 24hr tablet  Commonly known as: GLUCOPHAGE-XR  Take 500 mg by mouth 2 (two) times daily with meals.     metoprolol succinate 50 MG 24 hr tablet  Commonly known as: TOPROL-XL  Take 1 tablet (50 mg total) by mouth once daily.     multivitamin Tab  Take 1 tablet by mouth once daily.     naltrexone 50 mg tablet  Commonly known as: DEPADE  Take 50 mg by mouth once daily.     nicotine 21 mg/24 hr  Commonly known as: NICODERM CQ  Place 1 patch onto the skin once daily.     omeprazole 20 MG capsule  Commonly known as: PRILOSEC  Take 1 capsule (20 mg total) by mouth once daily.     sucralfate 1 gram tablet  Commonly known as: CARAFATE  Take 1 tablet (1 g total) by mouth 4 (four) times daily.     traZODone 100 MG tablet  Commonly known as: DESYREL  Take 2 tablets (200 mg total) by mouth every evening.              Time spent on the discharge of patient: >30 minutes         Tara Hubbard MD  Department of Hospital Medicine  [facility] ([address])

## 2024-04-10 ENCOUNTER — EXTERNAL HOME HEALTH (OUTPATIENT)
Dept: HOME HEALTH SERVICES | Facility: HOSPITAL | Age: 64
End: 2024-04-10
Payer: COMMERCIAL

## 2024-04-11 ENCOUNTER — HOSPITAL ENCOUNTER (INPATIENT)
Facility: HOSPITAL | Age: 64
LOS: 2 days | Discharge: HOME | DRG: 897 | End: 2024-04-13
Attending: EMERGENCY MEDICINE | Admitting: INTERNAL MEDICINE
Payer: COMMERCIAL

## 2024-04-11 DIAGNOSIS — R10.9 ABDOMINAL PAIN: ICD-10-CM

## 2024-04-11 DIAGNOSIS — E87.29 INCREASED ANION GAP METABOLIC ACIDOSIS: ICD-10-CM

## 2024-04-11 DIAGNOSIS — F10.930 ALCOHOL WITHDRAWAL SYNDROME WITHOUT COMPLICATION: Primary | ICD-10-CM

## 2024-04-11 PROBLEM — T73.0XXA STARVATION KETOACIDOSIS: Status: ACTIVE | Noted: 2022-04-29

## 2024-04-11 PROBLEM — I48.91 ATRIAL FIBRILLATION WITH RVR: Status: ACTIVE | Noted: 2024-04-11

## 2024-04-11 LAB
ALBUMIN SERPL BCP-MCNC: 4.1 G/DL (ref 3.5–5.2)
ALP SERPL-CCNC: 45 U/L (ref 55–135)
ALT SERPL W/O P-5'-P-CCNC: 22 U/L (ref 10–44)
AMPHET+METHAMPHET UR QL: NEGATIVE
ANION GAP SERPL CALC-SCNC: 21 MMOL/L (ref 8–16)
ANION GAP SERPL CALC-SCNC: 26 MMOL/L (ref 8–16)
APAP SERPL-MCNC: <3 UG/ML (ref 10–20)
AST SERPL-CCNC: 48 U/L (ref 10–40)
BACTERIA #/AREA URNS AUTO: NORMAL /HPF
BARBITURATES UR QL SCN>200 NG/ML: NEGATIVE
BASOPHILS # BLD AUTO: 0.03 K/UL (ref 0–0.2)
BASOPHILS NFR BLD: 0.3 % (ref 0–1.9)
BENZODIAZ UR QL SCN>200 NG/ML: NEGATIVE
BILIRUB SERPL-MCNC: 0.5 MG/DL (ref 0.1–1)
BILIRUB UR QL STRIP: NEGATIVE
BUN SERPL-MCNC: 5 MG/DL (ref 8–23)
BUN SERPL-MCNC: 5 MG/DL (ref 8–23)
BZE UR QL SCN: NEGATIVE
CALCIUM SERPL-MCNC: 8.5 MG/DL (ref 8.7–10.5)
CALCIUM SERPL-MCNC: 8.9 MG/DL (ref 8.7–10.5)
CANNABINOIDS UR QL SCN: NEGATIVE
CHLORIDE SERPL-SCNC: 101 MMOL/L (ref 95–110)
CHLORIDE SERPL-SCNC: 99 MMOL/L (ref 95–110)
CLARITY UR REFRACT.AUTO: CLEAR
CO2 SERPL-SCNC: 13 MMOL/L (ref 23–29)
CO2 SERPL-SCNC: 17 MMOL/L (ref 23–29)
COLOR UR AUTO: COLORLESS
CREAT SERPL-MCNC: 0.8 MG/DL (ref 0.5–1.4)
CREAT SERPL-MCNC: 0.9 MG/DL (ref 0.5–1.4)
CREAT UR-MCNC: 17 MG/DL (ref 15–325)
DIFFERENTIAL METHOD BLD: ABNORMAL
EOSINOPHIL # BLD AUTO: 0 K/UL (ref 0–0.5)
EOSINOPHIL NFR BLD: 0 % (ref 0–8)
ERYTHROCYTE [DISTWIDTH] IN BLOOD BY AUTOMATED COUNT: 20 % (ref 11.5–14.5)
EST. GFR  (NO RACE VARIABLE): >60 ML/MIN/1.73 M^2
EST. GFR  (NO RACE VARIABLE): >60 ML/MIN/1.73 M^2
ETHANOL SERPL-MCNC: 102 MG/DL
GLUCOSE SERPL-MCNC: 53 MG/DL (ref 70–110)
GLUCOSE SERPL-MCNC: 69 MG/DL (ref 70–110)
GLUCOSE UR QL STRIP: NEGATIVE
HCT VFR BLD AUTO: 34 % (ref 37–48.5)
HCV AB SERPL QL IA: NORMAL
HGB BLD-MCNC: 10.2 G/DL (ref 12–16)
HGB UR QL STRIP: ABNORMAL
HIV 1+2 AB+HIV1 P24 AG SERPL QL IA: NORMAL
HYALINE CASTS UR QL AUTO: 1 /LPF
IMM GRANULOCYTES # BLD AUTO: 0.06 K/UL (ref 0–0.04)
IMM GRANULOCYTES NFR BLD AUTO: 0.6 % (ref 0–0.5)
KETONES UR QL STRIP: ABNORMAL
LACTATE SERPL-SCNC: 2 MMOL/L (ref 0.5–2.2)
LEUKOCYTE ESTERASE UR QL STRIP: ABNORMAL
LIPASE SERPL-CCNC: 10 U/L (ref 4–60)
LYMPHOCYTES # BLD AUTO: 6.3 K/UL (ref 1–4.8)
LYMPHOCYTES NFR BLD: 58.4 % (ref 18–48)
MCH RBC QN AUTO: 26 PG (ref 27–31)
MCHC RBC AUTO-ENTMCNC: 30 G/DL (ref 32–36)
MCV RBC AUTO: 87 FL (ref 82–98)
METHADONE UR QL SCN>300 NG/ML: NEGATIVE
MICROSCOPIC COMMENT: NORMAL
MONOCYTES # BLD AUTO: 0.3 K/UL (ref 0.3–1)
MONOCYTES NFR BLD: 2.5 % (ref 4–15)
NEUTROPHILS # BLD AUTO: 4.1 K/UL (ref 1.8–7.7)
NEUTROPHILS NFR BLD: 38.2 % (ref 38–73)
NITRITE UR QL STRIP: NEGATIVE
NRBC BLD-RTO: 0 /100 WBC
OHS QRS DURATION: 92 MS
OHS QTC CALCULATION: 457 MS
OPIATES UR QL SCN: NEGATIVE
PCP UR QL SCN>25 NG/ML: NEGATIVE
PH UR STRIP: 6 [PH] (ref 5–8)
PLATELET # BLD AUTO: 86 K/UL (ref 150–450)
PMV BLD AUTO: 10 FL (ref 9.2–12.9)
POCT GLUCOSE: 106 MG/DL (ref 70–110)
POCT GLUCOSE: 68 MG/DL (ref 70–110)
POCT GLUCOSE: 75 MG/DL (ref 70–110)
POTASSIUM SERPL-SCNC: 3.2 MMOL/L (ref 3.5–5.1)
POTASSIUM SERPL-SCNC: 4.1 MMOL/L (ref 3.5–5.1)
PROT SERPL-MCNC: 7 G/DL (ref 6–8.4)
PROT UR QL STRIP: NEGATIVE
RBC # BLD AUTO: 3.93 M/UL (ref 4–5.4)
RBC #/AREA URNS AUTO: 3 /HPF (ref 0–4)
SODIUM SERPL-SCNC: 138 MMOL/L (ref 136–145)
SODIUM SERPL-SCNC: 139 MMOL/L (ref 136–145)
SP GR UR STRIP: 1.01 (ref 1–1.03)
SQUAMOUS #/AREA URNS AUTO: 1 /HPF
TOXICOLOGY INFORMATION: NORMAL
URN SPEC COLLECT METH UR: ABNORMAL
WBC # BLD AUTO: 10.82 K/UL (ref 3.9–12.7)
WBC #/AREA URNS AUTO: 1 /HPF (ref 0–5)

## 2024-04-11 PROCEDURE — 83605 ASSAY OF LACTIC ACID: CPT | Performed by: EMERGENCY MEDICINE

## 2024-04-11 PROCEDURE — 20600001 HC STEP DOWN PRIVATE ROOM

## 2024-04-11 PROCEDURE — 80053 COMPREHEN METABOLIC PANEL: CPT | Performed by: EMERGENCY MEDICINE

## 2024-04-11 PROCEDURE — 86803 HEPATITIS C AB TEST: CPT | Performed by: PHYSICIAN ASSISTANT

## 2024-04-11 PROCEDURE — 80307 DRUG TEST PRSMV CHEM ANLYZR: CPT | Performed by: EMERGENCY MEDICINE

## 2024-04-11 PROCEDURE — 93010 ELECTROCARDIOGRAM REPORT: CPT | Mod: ,,, | Performed by: INTERNAL MEDICINE

## 2024-04-11 PROCEDURE — 63600175 PHARM REV CODE 636 W HCPCS: Performed by: EMERGENCY MEDICINE

## 2024-04-11 PROCEDURE — 63600175 PHARM REV CODE 636 W HCPCS: Performed by: INTERNAL MEDICINE

## 2024-04-11 PROCEDURE — 85025 COMPLETE CBC W/AUTO DIFF WBC: CPT | Performed by: EMERGENCY MEDICINE

## 2024-04-11 PROCEDURE — 80048 BASIC METABOLIC PNL TOTAL CA: CPT | Mod: XB | Performed by: INTERNAL MEDICINE

## 2024-04-11 PROCEDURE — 25000003 PHARM REV CODE 250: Performed by: HOSPITALIST

## 2024-04-11 PROCEDURE — 80143 DRUG ASSAY ACETAMINOPHEN: CPT | Performed by: EMERGENCY MEDICINE

## 2024-04-11 PROCEDURE — 81001 URINALYSIS AUTO W/SCOPE: CPT | Mod: XB | Performed by: EMERGENCY MEDICINE

## 2024-04-11 PROCEDURE — 96361 HYDRATE IV INFUSION ADD-ON: CPT

## 2024-04-11 PROCEDURE — 25000003 PHARM REV CODE 250: Performed by: INTERNAL MEDICINE

## 2024-04-11 PROCEDURE — 83690 ASSAY OF LIPASE: CPT | Performed by: EMERGENCY MEDICINE

## 2024-04-11 PROCEDURE — 96374 THER/PROPH/DIAG INJ IV PUSH: CPT

## 2024-04-11 PROCEDURE — 93005 ELECTROCARDIOGRAM TRACING: CPT

## 2024-04-11 PROCEDURE — 87389 HIV-1 AG W/HIV-1&-2 AB AG IA: CPT | Performed by: PHYSICIAN ASSISTANT

## 2024-04-11 PROCEDURE — 96375 TX/PRO/DX INJ NEW DRUG ADDON: CPT

## 2024-04-11 PROCEDURE — 82077 ASSAY SPEC XCP UR&BREATH IA: CPT | Performed by: EMERGENCY MEDICINE

## 2024-04-11 PROCEDURE — 99285 EMERGENCY DEPT VISIT HI MDM: CPT | Mod: 25

## 2024-04-11 RX ORDER — GLUCAGON 1 MG
1 KIT INJECTION
Status: DISCONTINUED | OUTPATIENT
Start: 2024-04-11 | End: 2024-04-13 | Stop reason: HOSPADM

## 2024-04-11 RX ORDER — INSULIN ASPART 100 [IU]/ML
0-5 INJECTION, SOLUTION INTRAVENOUS; SUBCUTANEOUS
Status: DISCONTINUED | OUTPATIENT
Start: 2024-04-11 | End: 2024-04-13 | Stop reason: HOSPADM

## 2024-04-11 RX ORDER — PANTOPRAZOLE SODIUM 40 MG/1
40 TABLET, DELAYED RELEASE ORAL DAILY
Status: DISCONTINUED | OUTPATIENT
Start: 2024-04-11 | End: 2024-04-13 | Stop reason: HOSPADM

## 2024-04-11 RX ORDER — LORAZEPAM 2 MG/ML
2 INJECTION INTRAMUSCULAR
Status: COMPLETED | OUTPATIENT
Start: 2024-04-11 | End: 2024-04-11

## 2024-04-11 RX ORDER — IBUPROFEN 200 MG
16 TABLET ORAL
Status: DISCONTINUED | OUTPATIENT
Start: 2024-04-11 | End: 2024-04-13 | Stop reason: HOSPADM

## 2024-04-11 RX ORDER — DIAZEPAM 5 MG/1
5 TABLET ORAL EVERY 8 HOURS
Status: DISCONTINUED | OUTPATIENT
Start: 2024-04-11 | End: 2024-04-12

## 2024-04-11 RX ORDER — IBUPROFEN 200 MG
24 TABLET ORAL
Status: DISCONTINUED | OUTPATIENT
Start: 2024-04-11 | End: 2024-04-13 | Stop reason: HOSPADM

## 2024-04-11 RX ORDER — METOPROLOL SUCCINATE 50 MG/1
50 TABLET, EXTENDED RELEASE ORAL DAILY
Status: DISCONTINUED | OUTPATIENT
Start: 2024-04-11 | End: 2024-04-13 | Stop reason: HOSPADM

## 2024-04-11 RX ORDER — SODIUM CHLORIDE, SODIUM LACTATE, POTASSIUM CHLORIDE, CALCIUM CHLORIDE 600; 310; 30; 20 MG/100ML; MG/100ML; MG/100ML; MG/100ML
INJECTION, SOLUTION INTRAVENOUS CONTINUOUS
Status: DISCONTINUED | OUTPATIENT
Start: 2024-04-11 | End: 2024-04-12

## 2024-04-11 RX ORDER — DROPERIDOL 2.5 MG/ML
2.5 INJECTION, SOLUTION INTRAMUSCULAR; INTRAVENOUS ONCE
Status: COMPLETED | OUTPATIENT
Start: 2024-04-11 | End: 2024-04-11

## 2024-04-11 RX ORDER — DIAZEPAM 10 MG/2ML
5 INJECTION INTRAMUSCULAR EVERY 4 HOURS PRN
Status: DISCONTINUED | OUTPATIENT
Start: 2024-04-11 | End: 2024-04-13 | Stop reason: HOSPADM

## 2024-04-11 RX ORDER — ACETAMINOPHEN 500 MG
1000 TABLET ORAL ONCE
Status: COMPLETED | OUTPATIENT
Start: 2024-04-11 | End: 2024-04-11

## 2024-04-11 RX ORDER — ACETAMINOPHEN 325 MG/1
650 TABLET ORAL EVERY 6 HOURS PRN
Status: DISCONTINUED | OUTPATIENT
Start: 2024-04-11 | End: 2024-04-13 | Stop reason: HOSPADM

## 2024-04-11 RX ORDER — ESCITALOPRAM OXALATE 10 MG/1
20 TABLET ORAL DAILY
Status: DISCONTINUED | OUTPATIENT
Start: 2024-04-11 | End: 2024-04-13 | Stop reason: HOSPADM

## 2024-04-11 RX ORDER — LEVOTHYROXINE SODIUM 75 UG/1
75 TABLET ORAL
Status: DISCONTINUED | OUTPATIENT
Start: 2024-04-12 | End: 2024-04-13 | Stop reason: HOSPADM

## 2024-04-11 RX ORDER — ONDANSETRON HYDROCHLORIDE 2 MG/ML
4 INJECTION, SOLUTION INTRAVENOUS
Status: DISCONTINUED | OUTPATIENT
Start: 2024-04-11 | End: 2024-04-11

## 2024-04-11 RX ORDER — DROPERIDOL 2.5 MG/ML
2.5 INJECTION, SOLUTION INTRAMUSCULAR; INTRAVENOUS EVERY 6 HOURS PRN
Status: DISCONTINUED | OUTPATIENT
Start: 2024-04-11 | End: 2024-04-12

## 2024-04-11 RX ORDER — CHLORDIAZEPOXIDE HYDROCHLORIDE 25 MG/1
50 CAPSULE, GELATIN COATED ORAL EVERY 6 HOURS
Status: DISCONTINUED | OUTPATIENT
Start: 2024-04-11 | End: 2024-04-11

## 2024-04-11 RX ORDER — FOLIC ACID 1 MG/1
1 TABLET ORAL DAILY
Status: DISCONTINUED | OUTPATIENT
Start: 2024-04-11 | End: 2024-04-13 | Stop reason: HOSPADM

## 2024-04-11 RX ORDER — DIPHENHYDRAMINE HYDROCHLORIDE 50 MG/ML
12.5 INJECTION INTRAMUSCULAR; INTRAVENOUS
Status: COMPLETED | OUTPATIENT
Start: 2024-04-11 | End: 2024-04-11

## 2024-04-11 RX ADMIN — SODIUM CHLORIDE, POTASSIUM CHLORIDE, SODIUM LACTATE AND CALCIUM CHLORIDE: 600; 310; 30; 20 INJECTION, SOLUTION INTRAVENOUS at 03:04

## 2024-04-11 RX ADMIN — DROPERIDOL 2.5 MG: 2.5 INJECTION, SOLUTION INTRAMUSCULAR; INTRAVENOUS at 12:04

## 2024-04-11 RX ADMIN — DROPERIDOL 2.5 MG: 2.5 INJECTION, SOLUTION INTRAMUSCULAR; INTRAVENOUS at 09:04

## 2024-04-11 RX ADMIN — DIPHENHYDRAMINE HYDROCHLORIDE 12.5 MG: 50 INJECTION, SOLUTION INTRAMUSCULAR; INTRAVENOUS at 12:04

## 2024-04-11 RX ADMIN — METOPROLOL SUCCINATE 50 MG: 50 TABLET, EXTENDED RELEASE ORAL at 02:04

## 2024-04-11 RX ADMIN — LORAZEPAM 2 MG: 2 INJECTION INTRAMUSCULAR; INTRAVENOUS at 02:04

## 2024-04-11 RX ADMIN — SODIUM CHLORIDE, POTASSIUM CHLORIDE, SODIUM LACTATE AND CALCIUM CHLORIDE 500 ML: 600; 310; 30; 20 INJECTION, SOLUTION INTRAVENOUS at 01:04

## 2024-04-11 RX ADMIN — DIAZEPAM 5 MG: 10 INJECTION, SOLUTION INTRAMUSCULAR; INTRAVENOUS at 03:04

## 2024-04-11 RX ADMIN — ESCITALOPRAM OXALATE 20 MG: 5 TABLET, FILM COATED ORAL at 02:04

## 2024-04-11 RX ADMIN — ACETAMINOPHEN 1000 MG: 500 TABLET ORAL at 09:04

## 2024-04-11 RX ADMIN — DIAZEPAM 5 MG: 5 TABLET ORAL at 09:04

## 2024-04-11 RX ADMIN — APIXABAN 5 MG: 5 TABLET, FILM COATED ORAL at 09:04

## 2024-04-11 RX ADMIN — FOLIC ACID 1 MG: 1 TABLET ORAL at 02:04

## 2024-04-11 RX ADMIN — Medication 16 G: at 09:04

## 2024-04-11 RX ADMIN — PANTOPRAZOLE SODIUM 40 MG: 40 TABLET, DELAYED RELEASE ORAL at 02:04

## 2024-04-11 NOTE — ASSESSMENT & PLAN NOTE
Patient's COPD is controlled currently.  Patient is currently on COPD Pathway. Continue scheduled inhalers   , Steroids, Antibiotics, and Supplemental oxygen and monitor respiratory status closely.

## 2024-04-11 NOTE — ASSESSMENT & PLAN NOTE
"Patient's FSGs are uncontrolled due to hyperglycemia on current medication regimen.  Last A1c reviewed-   Lab Results   Component Value Date    HGBA1C 6.0 (H) 02/11/2024     Most recent fingerstick glucose reviewed- No results for input(s): "POCTGLUCOSE" in the last 24 hours.  Current correctional scale  Low  Decrease anti-hyperglycemic dose as follows-   Antihyperglycemics (From admission, onward)      Start     Stop Route Frequency Ordered    04/11/24 1537  insulin aspart U-100 pen 0-5 Units         -- SubQ Before meals & nightly PRN 04/11/24 1437          Hold Oral hypoglycemics while patient is in the hospital.  "

## 2024-04-11 NOTE — ED PROVIDER NOTES
"Encounter Date: 4/11/2024       History     Chief Complaint   Patient presents with    Abdominal Pain     Nausea/vomiting. Pt. Has hx. Of alcoholism and reports recently started drinking again     HPI  62 Y/O F with multiple comorbidities, including ETOH abuse/dependency is BIBA C/O nausea, NBNB emesis and gen malaise. No reported trauma/injuries reported. She denies any chest, abd, back pain on our discussion. No fever, chills or urinary Sx reported. Charts also report Hx of alcohol withdrawal seizures, CLL, COPD, depression with history of suicide attempts, bilateral mastectomy, hypothyroidism, and DMT2. Although no reported SI, she's telling staff that she wants to leave. However, she assures "drinking a lot".       Review of patient's allergies indicates:   Allergen Reactions    Lortab [hydrocodone-acetaminophen] Itching    Promethazine Itching and Other (See Comments)     Past Medical History:   Diagnosis Date    Alcoholism     c/b alcohol withdrawl seizures 7/2017    Anemia     Cancer of breast 10/2020    s/p bilateral mastectomy for  T1b N0 stage IA breast cancer October 2020    CLL (chronic lymphocytic leukemia) 09/30/2022 6/22/22 - PB flow cytometry  Immunophenotyping of peripheral blood detects a distinct kappa light chain restricted monoclonal B-cell population  (calculated at 2.44x10 9 /L, from the most recent CBC showing a total WBC of  7.35 K/uL with 61% total lymphocytes)  with a CLL phenotype (coexpression of CD19, CD5, CD23 and dim CD20). CD22 (FITC), FMC-7 and CD38 are negative in this population.    Controlled type 2 diabetes mellitus without complication, without long-term current use of insulin 11/30/2021    COPD (chronic obstructive pulmonary disease)     Depression     Diverticulitis     Fatty liver     GERD (gastroesophageal reflux disease)     Hyperlipidemia     Hypertension     Pancreatitis     Peptic ulcer disease     Polysubstance abuse     Posterior reversible encephalopathy syndrome  "    Sarcoidosis of lung     over 30 yrs ago    Suicide attempt      Past Surgical History:   Procedure Laterality Date    APPENDECTOMY      BILATERAL MASTECTOMY Bilateral 10/29/2020    Procedure: MASTECTOMY, BILATERAL;  Surgeon: Baylee Kevin MD;  [facility];  Service: General;  Laterality: Bilateral;    BREAST REVISION SURGERY Bilateral 2/11/2021    Procedure: BREAST REVISION SURGERY;  Surgeon: Scottie Johnson MD;  [facility];  Service: Plastics;  Laterality: Bilateral;    COLONOSCOPY N/A 7/28/2017    Procedure: COLONOSCOPY;  Surgeon: Aaron Alvarado MD;  Location: [facility];  Service: Endoscopy;  Laterality: N/A;    ESOPHAGOGASTRODUODENOSCOPY  10/7/2016, 11/6/2014    2016 - gastritis, duodenitis, 2014 erosive gastritis    ESOPHAGOGASTRODUODENOSCOPY N/A 2/11/2020    Procedure: ESOPHAGOGASTRODUODENOSCOPY (EGD);  Surgeon: Fawn Garrido MD;  Location: [facility];  Service: Endoscopy;  Laterality: N/A;    ESOPHAGOGASTRODUODENOSCOPY N/A 4/19/2021    Procedure: EGD (ESOPHAGOGASTRODUODENOSCOPY);  Surgeon: Paramjit Martino MD;  Location: [facility];  Service: Endoscopy;  Laterality: N/A;    FLEXIBLE SIGMOIDOSCOPY  11/06/2014    colitis    HYSTERECTOMY      IMPLANTATION OF PERMANENT SACRAL NERVE STIMULATOR N/A 7/12/2022    Procedure: INSERTION, NEUROSTIMULATOR, PERMANENT, SACRAL;  Surgeon: Juaquin Edwards MD;  Location: [facility] OR [address] [place] [address];  Service: Urology;  Laterality: N/A;  1hr    INJECTION FOR SENTINEL NODE IDENTIFICATION Right 10/29/2020    Procedure: INJECTION, FOR SENTINEL NODE IDENTIFICATION;  Surgeon: Baylee Kevin MD;  Location: [facility] OR [address] [place] [address];  Service: General;  Laterality: Right;    INJECTION OF JOINT Right 10/10/2019    Procedure: Injection, Joint RIGHT ILIOPSOAS BURSA/TENDON INJECTION AND RIGHT GLUTEAL TENDON INJECTION WITH STEROID AND LIDOCAINE;  Surgeon: Guillaume Rico MD;  Location: [facility];  Service: Pain Management;  Laterality: Right;  NEEDS CONSENT     INSERTION OF BREAST TISSUE EXPANDER Bilateral 10/29/2020    Procedure: INSERTION, TISSUE EXPANDER, BREAST;  Surgeon: Scottie Johnson MD;  Location: [facility];  Service: Plastics;  Laterality: Bilateral;  Right breast: 1082 g  Left breast: 1076 g    LIPOSUCTION Bilateral 2/11/2021    Procedure: LIPOSUCTION;  Surgeon: Scottie Johnson MD;  [facility];  Service: Plastics;  Laterality: Bilateral;    mediastenoscopy      REPLACEMENT OF IMPLANT OF BREAST Bilateral 2/11/2021    Procedure: REPLACEMENT, IMPLANT, BREAST;  Surgeon: Scottie Johnson MD;  Location: [facility] OR [facility];  Service: Plastics;  Laterality: Bilateral;    SENTINEL LYMPH NODE BIOPSY Right 10/29/2020    Procedure: BIOPSY, LYMPH NODE, SENTINEL;  Surgeon: Baylee Kevin MD;  [facility];  Service: General;  Laterality: Right;    TONSILLECTOMY N/A 1970    TUBAL LIGATION       Family History   Problem Relation Name Age of Onset    Heart attack Father      Diabetes Father      Hypertension Father      Diabetes Mother      Hypertension Mother      Breast cancer Maternal Aunt      Colon cancer Maternal Uncle      Breast cancer Daughter      Ovarian cancer Neg Hx      Cancer Neg Hx       Social History     Tobacco Use    Smoking status: Every Day     Current packs/day: 0.00     Average packs/day: 0.5 packs/day for 30.0 years (15.0 ttl pk-yrs)     Types: Vaping with nicotine, Cigarettes     Start date: 2/1/1991     Last attempt to quit: 2/1/2021     Years since quitting: 3.2    Smokeless tobacco: Never    Tobacco comments:     Patient is currently smoking 10 cigarettes a day, declines nicotine patches   Substance Use Topics    Alcohol use: Yes     Comment: vodka daily (half a regular bottle) for 4 days    Drug use: Yes     Types: Marijuana     Comment: gummies     Review of Systems    Physical Exam     Initial Vitals [04/11/24 1114]   BP Pulse Resp Temp SpO2   134/87 92 16 99.6 °F (37.6 °C) 98 %      MAP        --         Physical Exam  GENERAL: Anxious, Slurring Speech; ETOH-Scented; Mild to Mod distress 2/2 intoxicated state and nausea/vomiting.  HEENT: AT/NC; anicteric; speaking full sentences with no drooling.  NECK: Supple, FROM, no accessory muscle use.  HEART: Regular rate and rhythm, no M/G/T.  LUNGS: No Tachypnea for appreciated work of breathing, No Retractions, and CTA B/L with no W/R/R.  ABDOMEN: Soft, ND, NTTP.  No rigidity or involuntary guarding.   BACK: Atraumatic, No CVA tenderness B/L.  EXTREMITIES: FROM. No tremors. No truncal ataxia, but + Ataxic gait on her attempt to ambulate.  SKIN: Warm, Dry, No Skin Tears or Rashes. No Jaundice.  VASCULAR: 2+ pulses Prox+Dist & Symm with no delay.  NEUROLOGIC: Somnolent, but arousable to verbal stimuli leading to an anxious and hyperactive belligerent state warranting redirection.    ED Course   Procedures  Labs Reviewed   CBC W/ AUTO DIFFERENTIAL - Abnormal; Notable for the following components:       Result Value    RBC 3.93 (*)     Hemoglobin 10.2 (*)     Hematocrit 34.0 (*)     MCH 26.0 (*)     MCHC 30.0 (*)     RDW 20.0 (*)     Platelets 86 (*)     Immature Granulocytes 0.6 (*)     Immature Grans (Abs) 0.06 (*)     Lymph # 6.3 (*)     Lymph % 58.4 (*)     Mono % 2.5 (*)     All other components within normal limits    Narrative:     Release to patient->Immediate   COMPREHENSIVE METABOLIC PANEL - Abnormal; Notable for the following components:    CO2 13 (*)     Glucose 53 (*)     BUN 5 (*)     Alkaline Phosphatase 45 (*)     AST 48 (*)     Anion Gap 26 (*)     All other components within normal limits    Narrative:     Release to patient->Immediate   URINALYSIS, REFLEX TO URINE CULTURE - Abnormal; Notable for the following components:    Color, UA Colorless (*)     Ketones, UA 2+ (*)     Occult Blood UA 3+ (*)     Leukocytes, UA 1+ (*)     All other components within normal limits    Narrative:     Specimen Source->Urine   ALCOHOL,MEDICAL (ETHANOL) -  Abnormal; Notable for the following components:    Alcohol, Serum 102 (*)     All other components within normal limits   ACETAMINOPHEN LEVEL - Abnormal; Notable for the following components:    Acetaminophen (Tylenol), Serum <3.0 (*)     All other components within normal limits   BASIC METABOLIC PANEL - Abnormal; Notable for the following components:    Potassium 3.2 (*)     CO2 17 (*)     Glucose 69 (*)     BUN 5 (*)     Calcium 8.5 (*)     Anion Gap 21 (*)     All other components within normal limits   HIV 1 / 2 ANTIBODY    Narrative:     Release to patient->Immediate   HEPATITIS C ANTIBODY    Narrative:     Release to patient->Immediate   LIPASE    Narrative:     Release to patient->Immediate   LACTIC ACID, PLASMA   DRUG SCREEN PANEL, URINE EMERGENCY    Narrative:     Specimen Source->Urine   URINALYSIS MICROSCOPIC    Narrative:     Specimen Source->Urine   POCT GLUCOSE        ECG Results              EKG 12-lead (Final result)        Collection Time Result Time QRS Duration OHS QTC Calculation    04/11/24 12:49:57 04/11/24 13:54:15 92 457                     Final result by Interface, Lab In [facility] (04/11/24 13:54:23)                   Narrative:    Test Reason : R10.9,    Vent. Rate : 089 BPM     Atrial Rate : 089 BPM     P-R Int : 160 ms          QRS Dur : 092 ms      QT Int : 376 ms       P-R-T Axes : 076 064 049 degrees     QTc Int : 457 ms    Sinus rhythm with occasional Premature ventricular complexes  Otherwise normal ECG  When compared with ECG of 02-MAR-2024 00:17,  Premature ventricular complexes are now Present  QT has lengthened  Confirmed by Sg STEPHENSON MD ([id number]) on 4/11/2024 1:54:13 PM    Referred By: [email or web address]   SELF           Confirmed By:Sg STEPHENSON MD                                  Imaging Results    None          Medications   droPERidol injection 2.5 mg (2.5 mg Intravenous Given 4/11/24 1256)   diphenhydrAMINE injection 12.5 mg (12.5 mg Intravenous Given 4/11/24 1255)   lactated ringers  bolus 500 mL (0 mLs Intravenous Stopped 4/11/24 1408)   LORazepam injection 2 mg (2 mg Intravenous Given 4/11/24 1417)   acetaminophen tablet 1,000 mg (1,000 mg Oral Given 4/11/24 2137)   potassium chloride SA CR tablet 40 mEq (40 mEq Oral Given 4/13/24 0955)     Medical Decision Making  Intoxicated and gravely disabled F with Si/Sx of ETOH withdrawal despite her intoxicated state warranting IVF, labs and medications. PEC for grave disability given her inability to care for self. Will admit.  ____________________  Rolando Bliss MD, [facility]  Emergency Medicine Staff  ]      Amount and/or Complexity of Data Reviewed  Labs: ordered.    Risk  Prescription drug management.  Decision regarding hospitalization.                                      Clinical Impression:  Final diagnoses:  [R10.9] Abdominal pain  [F10.930] Alcohol withdrawal syndrome without complication (Primary)  [E87.29] Increased anion gap metabolic acidosis          ED Disposition Condition    Admit Stable                Hemal Bliss MD  04/16/24 0604

## 2024-04-11 NOTE — H&P
[facility] - Emergency Dept  [facility] Medicine  History & Physical    Patient Name: Earl Abdul  MRN: [id number]  Patient Class: IP- Inpatient  Admission Date: 4/11/2024  Attending Physician: Raman Urena MD  Primary Care Provider: Andrew Rodriguez MD         Patient information was obtained from patient and ER records.     Subjective:     Principal Problem:Alcohol withdrawal    Chief Complaint:   Chief Complaint   Patient presents with    Abdominal Pain     Nausea/vomiting. Pt. Has hx. Of alcoholism and reports recently started drinking again        HPI: Earl Abdul is a 63 y.o. female w/ a PMHx of EtOH use disorder, CLL not currently on any treatment, COPD, HTN, depression on multiple antidepressants, right breast cancer s/p bilateral mastectomy, hypothyroidism. Patient presents to the ED today for generalized fatigue and nausea.  Patient states that these are both chronic issues, but her nauseous becoming unbearable.    left her 6 weeks ago. Recent MICU admit. AG is 24    Patient was seen recently for similar complaints.  She was admitted for similar complaints of alcohol withdrawal and atraumatic rhabdomyolysis from 2/10-2/14/24.  In the ED, the pt had signs of active EtOH withdrawal. Last drink was night prior to admission. Per history she drinks a couple glasses per day of vodka, goes through a few bottles a week. Pt was nauseated, but was not vomiting. Pt was treated with benzodiazepines.    PEC in place, wanted to leave AMA without capacity    Past Medical History:   Diagnosis Date    Alcoholism     c/b alcohol withdrawl seizures 7/2017    Anemia     Cancer of breast 10/2020    s/p bilateral mastectomy for  T1b N0 stage IA breast cancer October 2020    CLL (chronic lymphocytic leukemia) 09/30/2022 6/22/22 - PB flow cytometry  Immunophenotyping of peripheral blood detects a distinct kappa light chain restricted monoclonal B-cell population  (calculated at 2.44x10 9 /L, from the most  recent CBC showing a total WBC of  7.35 K/uL with 61% total lymphocytes)  with a CLL phenotype (coexpression of CD19, CD5, CD23 and dim CD20). CD22 (FITC), FMC-7 and CD38 are negative in this population.    Controlled type 2 diabetes mellitus without complication, without long-term current use of insulin 11/30/2021    COPD (chronic obstructive pulmonary disease)     Depression     Diverticulitis     Fatty liver     GERD (gastroesophageal reflux disease)     Hyperlipidemia     Hypertension     Pancreatitis     Peptic ulcer disease     Polysubstance abuse     Posterior reversible encephalopathy syndrome     Sarcoidosis of lung     over 30 yrs ago    Suicide attempt        Past Surgical History:   Procedure Laterality Date    APPENDECTOMY      BILATERAL MASTECTOMY Bilateral 10/29/2020    Procedure: MASTECTOMY, BILATERAL;  Surgeon: Baylee Kevin MD;  Location: [facility] OR [address] [place] [address];  Service: General;  Laterality: Bilateral;    BREAST REVISION SURGERY Bilateral 2/11/2021    Procedure: BREAST REVISION SURGERY;  Surgeon: Scottie Johnson MD;  Location: [facility] OR [address] [place] [address];  Service: Plastics;  Laterality: Bilateral;    COLONOSCOPY N/A 7/28/2017    Procedure: COLONOSCOPY;  Surgeon: Aaron Alvarado MD;  Location: [facility];  Service: Endoscopy;  Laterality: N/A;    ESOPHAGOGASTRODUODENOSCOPY  10/7/2016, 11/6/2014    2016 - gastritis, duodenitis, 2014 erosive gastritis    ESOPHAGOGASTRODUODENOSCOPY N/A 2/11/2020    Procedure: ESOPHAGOGASTRODUODENOSCOPY (EGD);  Surgeon: Fawn Garrido MD;  Location: [facility];  Service: Endoscopy;  Laterality: N/A;    ESOPHAGOGASTRODUODENOSCOPY N/A 4/19/2021    Procedure: EGD (ESOPHAGOGASTRODUODENOSCOPY);  Surgeon: Paramjit Martino MD;  Location: [facility];  Service: Endoscopy;  Laterality: N/A;    FLEXIBLE SIGMOIDOSCOPY  11/06/2014    colitis    HYSTERECTOMY      IMPLANTATION OF PERMANENT SACRAL NERVE STIMULATOR N/A 7/12/2022    Procedure: INSERTION, NEUROSTIMULATOR, PERMANENT, SACRAL;   Surgeon: Juaquin Edwards MD;  Location: [address];  Service: Urology;  Laterality: N/A;  1hr    INJECTION FOR SENTINEL NODE IDENTIFICATION Right 10/29/2020    Procedure: INJECTION, FOR SENTINEL NODE IDENTIFICATION;  Surgeon: Baylee Kevin MD;  Location: [address];  Service: General;  Laterality: Right;    INJECTION OF JOINT Right 10/10/2019    Procedure: Injection, Joint RIGHT ILIOPSOAS BURSA/TENDON INJECTION AND RIGHT GLUTEAL TENDON INJECTION WITH STEROID AND LIDOCAINE;  Surgeon: Guillaume Rico MD;  Location: [facility];  Service: Pain Management;  Laterality: Right;  NEEDS CONSENT    INSERTION OF BREAST TISSUE EXPANDER Bilateral 10/29/2020    Procedure: INSERTION, TISSUE EXPANDER, BREAST;  Surgeon: Scottie Johnson MD;  Location: [address];  Service: Plastics;  Laterality: Bilateral;  Right breast: 1082 g  Left breast: 1076 g    LIPOSUCTION Bilateral 2/11/2021    Procedure: LIPOSUCTION;  Surgeon: Scottie Johnson MD;  Location: [address];  Service: Plastics;  Laterality: Bilateral;    mediastenoscopy      REPLACEMENT OF IMPLANT OF BREAST Bilateral 2/11/2021    Procedure: REPLACEMENT, IMPLANT, BREAST;  Surgeon: Scottie Johnson MD;  Location: [address];  Service: Plastics;  Laterality: Bilateral;    SENTINEL LYMPH NODE BIOPSY Right 10/29/2020    Procedure: BIOPSY, LYMPH NODE, SENTINEL;  Surgeon: Baylee Kevin MD;  Location: [address];  Service: General;  Laterality: Right;    TONSILLECTOMY N/A 1970    TUBAL LIGATION         Review of patient's allergies indicates:   Allergen Reactions    Lortab [hydrocodone-acetaminophen] Itching    Promethazine Itching and Other (See Comments)       Current Facility-Administered Medications on File Prior to Encounter   Medication    albuterol sulfate nebulizer solution 2.5 mg     Current Outpatient Medications on File Prior to Encounter   Medication Sig    apixaban (ELIQUIS) 5 mg Tab Take 1 tablet (5 mg total) by  mouth 2 (two) times daily.    EScitalopram oxalate (LEXAPRO) 20 MG tablet Take 1 tablet (20 mg total) by mouth once daily.    folic acid (FOLVITE) 1 MG tablet Take 1 tablet (1 mg total) by mouth once daily.    gabapentin (NEURONTIN) 300 MG capsule Take 1 capsule (300 mg total) by mouth 3 (three) times daily.    lancets Misc Use to check blood glucose 4 times daily    levothyroxine (SYNTHROID) 75 MCG tablet Take 1 tablet (75 mcg total) by mouth before breakfast.    metFORMIN (GLUCOPHAGE-XR) 500 MG ER 24hr tablet Take 500 mg by mouth 2 (two) times daily with meals.    metoprolol succinate (TOPROL-XL) 50 MG 24 hr tablet Take 1 tablet (50 mg total) by mouth once daily.    multivitamin Tab Take 1 tablet by mouth once daily.    naltrexone (DEPADE) 50 mg tablet Take 50 mg by mouth once daily.    nicotine (NICODERM CQ) 21 mg/24 hr Place 1 patch onto the skin once daily.    omeprazole (PRILOSEC) 20 MG capsule Take 1 capsule (20 mg total) by mouth once daily.    sucralfate (CARAFATE) 1 gram tablet Take 1 tablet (1 g total) by mouth 4 (four) times daily.    traZODone (DESYREL) 100 MG tablet Take 2 tablets (200 mg total) by mouth every evening.    [DISCONTINUED] omeprazole (PRILOSEC OTC) 20 MG tablet Take 20 mg by mouth once daily.     Family History       Problem Relation (Age of Onset)    Breast cancer Maternal Aunt, Daughter    Colon cancer Maternal Uncle    Diabetes Father, Mother    Heart attack Father    Hypertension Father, Mother          Tobacco Use    Smoking status: Every Day     Current packs/day: 0.00     Average packs/day: 0.5 packs/day for 30.0 years (15.0 ttl pk-yrs)     Types: Vaping with nicotine, Cigarettes     Start date: 2/1/1991     Last attempt to quit: 2/1/2021     Years since quitting: 3.1    Smokeless tobacco: Never    Tobacco comments:     Patient is currently smoking 10 cigarettes a day, declines nicotine patches   Substance and Sexual Activity    Alcohol use: Yes     Comment: vodka daily (half a  regular bottle) for 4 days    Drug use: Yes     Types: Marijuana     Comment: gummies    Sexual activity: Yes     Birth control/protection: Surgical     Review of Systems   Constitutional:  Positive for activity change and appetite change.   HENT: Negative.     Eyes: Negative.    Respiratory: Negative.     Cardiovascular: Negative.    Gastrointestinal:  Positive for abdominal distention.   Endocrine: Negative.    Genitourinary: Negative.    Musculoskeletal: Negative.    Allergic/Immunologic: Negative.    Neurological:  Positive for dizziness.   Hematological: Negative.    Psychiatric/Behavioral: Negative.       Objective:     Vital Signs (Most Recent):  Temp: 98.9 °F (37.2 °C) (04/11/24 1402)  Pulse: (!) 114 (04/11/24 1402)  Resp: 18 (04/11/24 1402)  BP: (!) 193/88 (04/11/24 1402)  SpO2: (!) 94 % (04/11/24 1402) Vital Signs (24h Range):  Temp:  [98.9 °F (37.2 °C)-99.6 °F (37.6 °C)] 98.9 °F (37.2 °C)  Pulse:  [] 114  Resp:  [16-18] 18  SpO2:  [94 %-98 %] 94 %  BP: (134-193)/(87-88) 193/88     Weight: 81.3 kg (179 lb 5.3 oz)  Body mass index is 28.94 kg/m².     Physical Exam  Constitutional:       General: She is in acute distress.      Appearance: She is ill-appearing and diaphoretic.   HENT:      Head: Normocephalic.      Nose: Nose normal.      Mouth/Throat:      Mouth: Mucous membranes are dry.   Eyes:      Pupils: Pupils are equal, round, and reactive to light.   Cardiovascular:      Rate and Rhythm: Tachycardia present.   Pulmonary:      Effort: Pulmonary effort is normal.   Abdominal:      General: Abdomen is flat.   Musculoskeletal:         General: Normal range of motion.   Skin:     General: Skin is warm.   Neurological:      General: No focal deficit present.              CRANIAL NERVES     CN III, IV, VI   Pupils are equal, round, and reactive to light.       Significant Labs: All pertinent labs within the past 24 hours have been reviewed.  BMP:   Recent Labs   Lab 04/11/24  1159   GLU 53*     "  K 4.1   CL 99   CO2 13*   BUN 5*   CREATININE 0.9   CALCIUM 8.9       Significant Imaging: I have reviewed all pertinent imaging results/findings within the past 24 hours.  Assessment/Plan:     * Alcohol withdrawal  CIWA score 10  - thiamine 100 mg p.o.  - folic acid 1 mg  - monitor with vitals, CIWA score q.6h  - cont valium scheduled and prn  -Admitted to MICU in past due to ETOH withdrawal  -Given IVF in ER  - LFTs' are stable  PEC in place    Moderate Protein Calorie Malnutrition    -       Atrial fibrillation with RVR  Patient with Paroxysmal (<7 days) atrial fibrillation which is controlled currently with Beta Blocker. Patient is currently in sinus rhythm.[id number] Score: 3. HASBLED Score:  Anticoagulation indicated. Anticoagulation done with elequis .    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   Currently in remission on no treatment      Type 2 diabetes mellitus without complication, without long-term current use of insulin  Patient's FSGs are uncontrolled due to hyperglycemia on current medication regimen.  Last A1c reviewed-   Lab Results   Component Value Date    HGBA1C 6.0 (H) 02/11/2024     Most recent fingerstick glucose reviewed- No results for input(s): "POCTGLUCOSE" in the last 24 hours.  Current correctional scale  Low  Decrease anti-hyperglycemic dose as follows-   Antihyperglycemics (From admission, onward)      Start     Stop Route Frequency Ordered    04/11/24 1537  insulin aspart U-100 pen 0-5 Units         -- SubQ Before meals & nightly PRN 04/11/24 1437          Hold Oral hypoglycemics while patient is in the hospital.    COPD (chronic obstructive pulmonary " disease)  Patient's COPD is controlled currently.  Patient is currently on COPD Pathway. Continue scheduled inhalers   , Steroids, Antibiotics, and Supplemental oxygen and monitor respiratory status closely.     Alcohol use disorder, severe, dependence          VTE Risk Mitigation (From admission, onward)           Ordered     apixaban tablet 5 mg  2 times daily         04/11/24 0987                                    Raman Urena MD  Department of Hospital Medicine  [facility] - Emergency Dept

## 2024-04-11 NOTE — ASSESSMENT & PLAN NOTE
CIWA score 10  - thiamine 100 mg p.o.  - folic acid 1 mg  - monitor with vitals, CIWA score q.6h  - cont valium scheduled and prn  -Admitted to MICU in past due to ETOH withdrawal  -Given IVF in ER  - LFTs' are stable  PEC in place  -

## 2024-04-11 NOTE — ASSESSMENT & PLAN NOTE
Patient with Paroxysmal (<7 days) atrial fibrillation which is controlled currently with Beta Blocker. Patient is currently in sinus rhythm.BSYLS2BTCp Score: 3. HASBLED Score:  Anticoagulation indicated. Anticoagulation done with elequis .

## 2024-04-11 NOTE — SUBJECTIVE & OBJECTIVE
Past Medical History:   Diagnosis Date    Alcoholism     c/b alcohol withdrawl seizures 7/2017    Anemia     Cancer of breast 10/2020    s/p bilateral mastectomy for  T1b N0 stage IA breast cancer October 2020    CLL (chronic lymphocytic leukemia) 09/30/2022 6/22/22 - PB flow cytometry  Immunophenotyping of peripheral blood detects a distinct kappa light chain restricted monoclonal B-cell population  (calculated at 2.44x10 9 /L, from the most recent CBC showing a total WBC of  7.35 K/uL with 61% total lymphocytes)  with a CLL phenotype (coexpression of CD19, CD5, CD23 and dim CD20). CD22 (FITC), FMC-7 and CD38 are negative in this population.    Controlled type 2 diabetes mellitus without complication, without long-term current use of insulin 11/30/2021    COPD (chronic obstructive pulmonary disease)     Depression     Diverticulitis     Fatty liver     GERD (gastroesophageal reflux disease)     Hyperlipidemia     Hypertension     Pancreatitis     Peptic ulcer disease     Polysubstance abuse     Posterior reversible encephalopathy syndrome     Sarcoidosis of lung     over 30 yrs ago    Suicide attempt        Past Surgical History:   Procedure Laterality Date    APPENDECTOMY      BILATERAL MASTECTOMY Bilateral 10/29/2020    Procedure: MASTECTOMY, BILATERAL;  Surgeon: Baylee Kevin MD;  Location: [address];  Service: General;  Laterality: Bilateral;    BREAST REVISION SURGERY Bilateral 2/11/2021    Procedure: BREAST REVISION SURGERY;  Surgeon: Scottie Johnson MD;  Location: [address];  Service: Plastics;  Laterality: Bilateral;    COLONOSCOPY N/A 7/28/2017    Procedure: COLONOSCOPY;  Surgeon: Aaron Alvarado MD;  Location: [facility];  Service: Endoscopy;  Laterality: N/A;    ESOPHAGOGASTRODUODENOSCOPY  10/7/2016, 11/6/2014    2016 - gastritis, duodenitis, 2014 erosive gastritis    ESOPHAGOGASTRODUODENOSCOPY N/A 2/11/2020    Procedure: ESOPHAGOGASTRODUODENOSCOPY (EGD);  Surgeon: Fawn Mon  MD Hema;  Location: [facility];  Service: Endoscopy;  Laterality: N/A;    ESOPHAGOGASTRODUODENOSCOPY N/A 4/19/2021    Procedure: EGD (ESOPHAGOGASTRODUODENOSCOPY);  Surgeon: Paramjit Martino MD;  Location: [facility] ENDO;  Service: Endoscopy;  Laterality: N/A;    FLEXIBLE SIGMOIDOSCOPY  11/06/2014    colitis    HYSTERECTOMY      IMPLANTATION OF PERMANENT SACRAL NERVE STIMULATOR N/A 7/12/2022    Procedure: INSERTION, NEUROSTIMULATOR, PERMANENT, SACRAL;  Surgeon: Juaquin Edwards MD;  Location: [address];  Service: Urology;  Laterality: N/A;  1hr    INJECTION FOR SENTINEL NODE IDENTIFICATION Right 10/29/2020    Procedure: INJECTION, FOR SENTINEL NODE IDENTIFICATION;  Surgeon: Baylee Kevin MD;  Location: [address];  Service: General;  Laterality: Right;    INJECTION OF JOINT Right 10/10/2019    Procedure: Injection, Joint RIGHT ILIOPSOAS BURSA/TENDON INJECTION AND RIGHT GLUTEAL TENDON INJECTION WITH STEROID AND LIDOCAINE;  Surgeon: Guillaume Rico MD;  Location: [facility] PAIN MGT;  Service: Pain Management;  Laterality: Right;  NEEDS CONSENT    INSERTION OF BREAST TISSUE EXPANDER Bilateral 10/29/2020    Procedure: INSERTION, TISSUE EXPANDER, BREAST;  Surgeon: Scottie Johnson MD;  Location: [address];  Service: Plastics;  Laterality: Bilateral;  Right breast: 1082 g  Left breast: 1076 g    LIPOSUCTION Bilateral 2/11/2021    Procedure: LIPOSUCTION;  Surgeon: Scottie Johnson MD;  Location: [address];  Service: Plastics;  Laterality: Bilateral;    mediastenoscopy      REPLACEMENT OF IMPLANT OF BREAST Bilateral 2/11/2021    Procedure: REPLACEMENT, IMPLANT, BREAST;  Surgeon: Scottie Johnson MD;  Location: [facility] OR [address] [place] [address];  Service: Plastics;  Laterality: Bilateral;    SENTINEL LYMPH NODE BIOPSY Right 10/29/2020    Procedure: BIOPSY, LYMPH NODE, SENTINEL;  Surgeon: Baylee Kevin MD;  Location: [address];  Service: General;  Laterality: Right;    TONSILLECTOMY N/A 1970    TUBAL  LIGATION         Review of patient's allergies indicates:   Allergen Reactions    Lortab [hydrocodone-acetaminophen] Itching    Promethazine Itching and Other (See Comments)       Current Facility-Administered Medications on File Prior to Encounter   Medication    albuterol sulfate nebulizer solution 2.5 mg     Current Outpatient Medications on File Prior to Encounter   Medication Sig    apixaban (ELIQUIS) 5 mg Tab Take 1 tablet (5 mg total) by mouth 2 (two) times daily.    EScitalopram oxalate (LEXAPRO) 20 MG tablet Take 1 tablet (20 mg total) by mouth once daily.    folic acid (FOLVITE) 1 MG tablet Take 1 tablet (1 mg total) by mouth once daily.    gabapentin (NEURONTIN) 300 MG capsule Take 1 capsule (300 mg total) by mouth 3 (three) times daily.    lancets Misc Use to check blood glucose 4 times daily    levothyroxine (SYNTHROID) 75 MCG tablet Take 1 tablet (75 mcg total) by mouth before breakfast.    metFORMIN (GLUCOPHAGE-XR) 500 MG ER 24hr tablet Take 500 mg by mouth 2 (two) times daily with meals.    metoprolol succinate (TOPROL-XL) 50 MG 24 hr tablet Take 1 tablet (50 mg total) by mouth once daily.    multivitamin Tab Take 1 tablet by mouth once daily.    naltrexone (DEPADE) 50 mg tablet Take 50 mg by mouth once daily.    nicotine (NICODERM CQ) 21 mg/24 hr Place 1 patch onto the skin once daily.    omeprazole (PRILOSEC) 20 MG capsule Take 1 capsule (20 mg total) by mouth once daily.    sucralfate (CARAFATE) 1 gram tablet Take 1 tablet (1 g total) by mouth 4 (four) times daily.    traZODone (DESYREL) 100 MG tablet Take 2 tablets (200 mg total) by mouth every evening.    [DISCONTINUED] omeprazole (PRILOSEC OTC) 20 MG tablet Take 20 mg by mouth once daily.     Family History       Problem Relation (Age of Onset)    Breast cancer Maternal Aunt, Daughter    Colon cancer Maternal Uncle    Diabetes Father, Mother    Heart attack Father    Hypertension Father, Mother          Tobacco Use    Smoking status: Every Day      Current packs/day: 0.00     Average packs/day: 0.5 packs/day for 30.0 years (15.0 ttl pk-yrs)     Types: Vaping with nicotine, Cigarettes     Start date: 2/1/1991     Last attempt to quit: 2/1/2021     Years since quitting: 3.1    Smokeless tobacco: Never    Tobacco comments:     Patient is currently smoking 10 cigarettes a day, declines nicotine patches   Substance and Sexual Activity    Alcohol use: Yes     Comment: vodka daily (half a regular bottle) for 4 days    Drug use: Yes     Types: Marijuana     Comment: gummies    Sexual activity: Yes     Birth control/protection: Surgical     Review of Systems   Constitutional:  Positive for activity change and appetite change.   HENT: Negative.     Eyes: Negative.    Respiratory: Negative.     Cardiovascular: Negative.    Gastrointestinal:  Positive for abdominal distention.   Endocrine: Negative.    Genitourinary: Negative.    Musculoskeletal: Negative.    Allergic/Immunologic: Negative.    Neurological:  Positive for dizziness.   Hematological: Negative.    Psychiatric/Behavioral: Negative.       Objective:     Vital Signs (Most Recent):  Temp: 98.9 °F (37.2 °C) (04/11/24 1402)  Pulse: (!) 114 (04/11/24 1402)  Resp: 18 (04/11/24 1402)  BP: (!) 193/88 (04/11/24 1402)  SpO2: (!) 94 % (04/11/24 1402) Vital Signs (24h Range):  Temp:  [98.9 °F (37.2 °C)-99.6 °F (37.6 °C)] 98.9 °F (37.2 °C)  Pulse:  [] 114  Resp:  [16-18] 18  SpO2:  [94 %-98 %] 94 %  BP: (134-193)/(87-88) 193/88     Weight: 81.3 kg (179 lb 5.3 oz)  Body mass index is 28.94 kg/m².     Physical Exam  Constitutional:       General: She is in acute distress.      Appearance: She is ill-appearing and diaphoretic.   HENT:      Head: Normocephalic.      Nose: Nose normal.      Mouth/Throat:      Mouth: Mucous membranes are dry.   Eyes:      Pupils: Pupils are equal, round, and reactive to light.   Cardiovascular:      Rate and Rhythm: Tachycardia present.   Pulmonary:      Effort: Pulmonary effort is  normal.   Abdominal:      General: Abdomen is flat.   Musculoskeletal:         General: Normal range of motion.   Skin:     General: Skin is warm.   Neurological:      General: No focal deficit present.              CRANIAL NERVES     CN III, IV, VI   Pupils are equal, round, and reactive to light.       Significant Labs: All pertinent labs within the past 24 hours have been reviewed.  BMP:   Recent Labs   Lab 04/11/24  1159   GLU 53*      K 4.1   CL 99   CO2 13*   BUN 5*   CREATININE 0.9   CALCIUM 8.9       Significant Imaging: I have reviewed all pertinent imaging results/findings within the past 24 hours.

## 2024-04-11 NOTE — ED TRIAGE NOTES
Patient comes into the emergency department by POV with complaints of nausea/abdominal pain. Patient states that she has a history of alcohol abuse and leukemia and her  left her 6 weeks ago so she started drinking again. Pt states she feels tremulous and has been having nausea. Patient denies diarrhea/constipation, HA, dizziness, hallucinations, visual disturbances and vomiting.

## 2024-04-11 NOTE — ASSESSMENT & PLAN NOTE
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   Currently in remission on no treatment

## 2024-04-11 NOTE — ED NOTES
Security called to escort pt to room 84567. All belongings collected to include a sealed security envelope and the PEC brought up with pt.

## 2024-04-11 NOTE — HPI
Earl Abdul is a 63 y.o. female w/ a PMHx of EtOH use disorder, CLL not currently on any treatment, COPD, HTN, depression on multiple antidepressants, right breast cancer s/p bilateral mastectomy, hypothyroidism. Patient presents to the ED today for generalized fatigue and nausea.  Patient states that these are both chronic issues, but her nauseous becoming unbearable.    left her 6 weeks ago. Recent MICU admit.    Patient was seen recently for similar complaints a  She was admitted for similar complaints of alcohol withdrawal and atraumatic rhabdomyolysis from 2/10-2/14/24.  In the ED, the pt had signs of active EtOH withdrawal. Last drink was night prior to admission. Per history she drinks a couple glasses per day of vodka, goes through a few bottles a week. Pt was nauseated, but was not vomiting. Pt was treated with benzodiazepines.    PEC in place.

## 2024-04-12 PROBLEM — E44.0 MODERATE MALNUTRITION: Status: ACTIVE | Noted: 2024-04-12

## 2024-04-12 LAB
ANION GAP SERPL CALC-SCNC: 12 MMOL/L (ref 8–16)
BACTERIA BLD CULT: NORMAL
BACTERIA BLD CULT: NORMAL
BUN SERPL-MCNC: 4 MG/DL (ref 8–23)
CALCIUM SERPL-MCNC: 8.7 MG/DL (ref 8.7–10.5)
CHLORIDE SERPL-SCNC: 100 MMOL/L (ref 95–110)
CO2 SERPL-SCNC: 27 MMOL/L (ref 23–29)
CREAT SERPL-MCNC: 0.9 MG/DL (ref 0.5–1.4)
EST. GFR  (NO RACE VARIABLE): >60 ML/MIN/1.73 M^2
GLUCOSE SERPL-MCNC: 76 MG/DL (ref 70–110)
POCT GLUCOSE: 121 MG/DL (ref 70–110)
POCT GLUCOSE: 125 MG/DL (ref 70–110)
POCT GLUCOSE: 76 MG/DL (ref 70–110)
POTASSIUM SERPL-SCNC: 3.2 MMOL/L (ref 3.5–5.1)
SODIUM SERPL-SCNC: 139 MMOL/L (ref 136–145)

## 2024-04-12 PROCEDURE — 25000003 PHARM REV CODE 250: Performed by: INTERNAL MEDICINE

## 2024-04-12 PROCEDURE — 25000003 PHARM REV CODE 250: Performed by: HOSPITALIST

## 2024-04-12 PROCEDURE — 99223 1ST HOSP IP/OBS HIGH 75: CPT | Mod: ,,, | Performed by: PSYCHIATRY & NEUROLOGY

## 2024-04-12 PROCEDURE — 63600175 PHARM REV CODE 636 W HCPCS: Performed by: INTERNAL MEDICINE

## 2024-04-12 PROCEDURE — 20600001 HC STEP DOWN PRIVATE ROOM

## 2024-04-12 PROCEDURE — 36415 COLL VENOUS BLD VENIPUNCTURE: CPT | Performed by: INTERNAL MEDICINE

## 2024-04-12 PROCEDURE — 80048 BASIC METABOLIC PNL TOTAL CA: CPT | Performed by: INTERNAL MEDICINE

## 2024-04-12 PROCEDURE — 94761 N-INVAS EAR/PLS OXIMETRY MLT: CPT

## 2024-04-12 RX ORDER — TRAZODONE HYDROCHLORIDE 100 MG/1
200 TABLET ORAL NIGHTLY
Status: DISCONTINUED | OUTPATIENT
Start: 2024-04-12 | End: 2024-04-13 | Stop reason: HOSPADM

## 2024-04-12 RX ORDER — POTASSIUM CHLORIDE 20 MEQ/1
40 TABLET, EXTENDED RELEASE ORAL 2 TIMES DAILY
Status: COMPLETED | OUTPATIENT
Start: 2024-04-12 | End: 2024-04-13

## 2024-04-12 RX ORDER — ONDANSETRON HYDROCHLORIDE 2 MG/ML
4 INJECTION, SOLUTION INTRAVENOUS EVERY 6 HOURS PRN
Status: DISCONTINUED | OUTPATIENT
Start: 2024-04-12 | End: 2024-04-13 | Stop reason: HOSPADM

## 2024-04-12 RX ORDER — ONDANSETRON HYDROCHLORIDE 2 MG/ML
4 INJECTION, SOLUTION INTRAVENOUS EVERY 8 HOURS PRN
Status: DISCONTINUED | OUTPATIENT
Start: 2024-04-12 | End: 2024-04-12

## 2024-04-12 RX ORDER — DIAZEPAM 5 MG/1
10 TABLET ORAL EVERY 8 HOURS
Status: DISCONTINUED | OUTPATIENT
Start: 2024-04-12 | End: 2024-04-13 | Stop reason: HOSPADM

## 2024-04-12 RX ORDER — QUETIAPINE FUMARATE 25 MG/1
25 TABLET, FILM COATED ORAL NIGHTLY PRN
Status: DISCONTINUED | OUTPATIENT
Start: 2024-04-12 | End: 2024-04-12

## 2024-04-12 RX ORDER — TALC
6 POWDER (GRAM) TOPICAL NIGHTLY PRN
Status: DISCONTINUED | OUTPATIENT
Start: 2024-04-12 | End: 2024-04-12

## 2024-04-12 RX ADMIN — APIXABAN 5 MG: 5 TABLET, FILM COATED ORAL at 08:04

## 2024-04-12 RX ADMIN — ACETAMINOPHEN 650 MG: 325 TABLET ORAL at 07:04

## 2024-04-12 RX ADMIN — POTASSIUM CHLORIDE 40 MEQ: 1500 TABLET, EXTENDED RELEASE ORAL at 08:04

## 2024-04-12 RX ADMIN — DIAZEPAM 5 MG: 5 TABLET ORAL at 05:04

## 2024-04-12 RX ADMIN — PANTOPRAZOLE SODIUM 40 MG: 40 TABLET, DELAYED RELEASE ORAL at 10:04

## 2024-04-12 RX ADMIN — METOPROLOL SUCCINATE 50 MG: 50 TABLET, EXTENDED RELEASE ORAL at 10:04

## 2024-04-12 RX ADMIN — ESCITALOPRAM OXALATE 20 MG: 5 TABLET, FILM COATED ORAL at 10:04

## 2024-04-12 RX ADMIN — APIXABAN 5 MG: 5 TABLET, FILM COATED ORAL at 10:04

## 2024-04-12 RX ADMIN — LEVOTHYROXINE SODIUM 75 MCG: 75 TABLET ORAL at 05:04

## 2024-04-12 RX ADMIN — FOLIC ACID 1 MG: 1 TABLET ORAL at 10:04

## 2024-04-12 RX ADMIN — ONDANSETRON 4 MG: 2 INJECTION INTRAMUSCULAR; INTRAVENOUS at 01:04

## 2024-04-12 RX ADMIN — DIAZEPAM 10 MG: 5 TABLET ORAL at 09:04

## 2024-04-12 RX ADMIN — ONDANSETRON 4 MG: 2 INJECTION INTRAMUSCULAR; INTRAVENOUS at 08:04

## 2024-04-12 RX ADMIN — TRAZODONE HYDROCHLORIDE 200 MG: 100 TABLET ORAL at 09:04

## 2024-04-12 RX ADMIN — QUETIAPINE FUMARATE 25 MG: 25 TABLET ORAL at 01:04

## 2024-04-12 RX ADMIN — DIAZEPAM 10 MG: 5 TABLET ORAL at 03:04

## 2024-04-12 NOTE — PLAN OF CARE
[facility] - Stepdown Flex ([place]-14)  Initial Discharge Assessment       Primary Care Provider: Andrew Rodriguez MD    Admission Diagnosis: Abdominal pain [R10.9]  Increased anion gap metabolic acidosis [E87.29]  Alcohol withdrawal syndrome without complication [F10.930]    Admission Date: 4/11/2024  Expected Discharge Date: 4/13/2024    Transition of Care Barriers: None    Payor: [employer] / Plan: [facility] CHOICE PLUS / Product Type: Commercial /     Extended Emergency Contact Information  Primary Emergency Contact: Henrique Abdul  Address: [address] [place] [address] [address] [place] [place] [address] [facility]  Home Phone: [phone]  Relation: Spouse   needed? No  Secondary Emergency Contact: Carlos Suazo  Mobile Phone: [phone]  Relation: Son    Discharge Plan A: Home with family  Discharge Plan B: Home      [id number] [place] - [address] AT [facility]  [address]  [place]   [place] [address]  Phone: [phone] Fax: [phone]      Initial Assessment (most recent)       Adult Discharge Assessment - 04/12/24 1513          Discharge Assessment    Assessment Type Discharge Planning Assessment     Confirmed/corrected address, phone number and insurance Yes     Confirmed Demographics Correct on Facesheet     Source of Information patient     Does patient/caregiver understand observation status Yes     Communicated BECCA with patient/caregiver Date not available/Unable to determine     Reason For Admission Abdominal pain     People in Home spouse     Do you expect to return to your current living situation? Yes     Prior to hospitilization cognitive status: Alert/Oriented     Current cognitive status: Alert/Oriented     Walking or Climbing Stairs Difficulty no     Dressing/Bathing Difficulty no     Equipment Currently Used at Home none     Readmission within 30 days? No     Patient currently being followed by outpatient case  "management? No     Do you have prescription coverage? Yes     Coverage [facility]     Do you have any problems affording any of your prescribed medications? No     Is the patient taking medications as prescribed? yes     Who is going to help you get home at discharge? spouse     Are you on dialysis? No     Do you take coumadin? No     Discharge Plan A Home with family     Discharge Plan B Home     DME Needed Upon Discharge  none     Discharge Plan discussed with: Patient     Transition of Care Barriers None                   SW met with pt to discuss discharge planning.  Pt lives with spouse and doesn't need assistance with ambulation and  ADLs.  Pt will have transportation and assistance from spouse at discharge.  Discharge Plan A and Plan B have been determined by review of patient's clinical status, future medical and therapeutic needs, and coverage/benefits for post-acute care in coordination with multidisciplinary team members.      DALILA spoke with pt about rehab for substance abuse and pt stated, " I'll handle that on my own."    Molly Henry MSW, CSW               "

## 2024-04-12 NOTE — PROGRESS NOTES
STAFF NOTE    The chart was reviewed and the case was discussed, including the assessment and management plan.  I have personally interviewed the patient, and agree with the overall findings, with corrections or clarifications, if any, noted herein.    *Patient well known to me through both ORP and addiction consult service.  She re-presents again after another relapse to active heavy daily drinking.  Currently undergoing alcohol detox protocol.  She is currently under PEC - however, psychiatric hospitalization is not anticipated upon stabilization.  Instead, she appears quite confused to me - disoriented to time even after multiple reorientation.  She asks about discharge but lacks the ability to accurately weigh risks/benefits - and furthermore can articulate no clear plan and has no support in the home.  Given this, I would say she currently lacks capacity to leave against medical advice.  Agree with alcohol detox protocol - high risk for complicated withdrawal.  Would benefit from rehab and 12 step meetings status post stabilization.*    Liborio Patel MD  Board Certification: Psychiatry and Addiction Medicine

## 2024-04-12 NOTE — CONSULTS
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   4/11/2024   Hgb 10.2 (L)  4/11/2024     BUN 5 (L)  4/11/2024  Trop I 0.011  2/16/2024  HCT 34.0 (L)  4/11/2024     GFR >60.0  4/11/2024   CPK 56  4/7/2024  PLT 86 (L)  4/11/2024     Alb 4.1  4/11/2024   PRL *   *  B12 528  2/22/2024     T Bili 0.5  4/11/2024  Chol 184  4/6/2023  B9 13.3  4/6/2023    ALP 45 (L)  4/11/2024  TGs 161 (H)  4/6/2023  B1 136 (H)  *    AST 48 (H)  4/11/2024  HDL 44  4/6/2023  Vit D *   *     ALT 22  4/11/2024  .8  4/6/2023  HIV Non-reactive  4/11/2024     INR 1.0  3/2/2024  [place] *   *   Hep C Non-reactive  4/11/2024    GGT *   *  Lip 10  4/11/2024  RPR *   *    MCV 87  4/11/2024   NH4 29  4/6/2023  UPT *   *      PETH 301  11/13/2023  THC Negative  4/11/2024    ETOH 102 (H)  4/11/2024  DESIREE Negative  4/11/2024    EtG Negative  11/13/2023  AMP Negative  4/11/2024    ALC 18 (A)  2/10/2024  OPI Negative  4/11/2024    BZO Negative  4/11/2024  MTD Negative  4/11/2024     BAR Negative  4/11/2024  BUP *   *    PCP Negative  4/11/2024  FEN Not Detected  7/18/2023     Results for orders placed or performed during the hospital encounter of 04/11/24   EKG 12-lead    Collection Time: 04/11/24 12:49 PM   Result Value Ref Range    QRS Duration 92 ms    OHS QTC Calculation 457 ms    Narrative    Test Reason : R10.9,    Vent. Rate : 089 BPM     Atrial Rate : 089 BPM     P-R Int : 160 ms          QRS Dur : 092 ms      QT Int : 376  ms       P-R-T Axes : 076 064 049 degrees     QTc Int : 457 ms    Sinus rhythm with occasional Premature ventricular complexes  Otherwise normal ECG  When compared with ECG of 02-MAR-2024 00:17,  Premature ventricular complexes are now Present  QT has lengthened  Confirmed by Sg STEPHENSON MD ([id number]) on 4/11/2024 1:54:13 PM    Referred By: [facility]   SELF           Confirmed By:Sg STEPHENSON MD       Results for orders placed or performed during the hospital encounter of 03/01/24   CT Head Without Contrast    Narrative    EXAMINATION:  CT HEAD WITHOUT CONTRAST    CLINICAL HISTORY:  Neuro deficit, acute, stroke suspected;left UE weakness started 1 week ago. She had Afib RVR y'day. CT head result will guide me to start anticoagulant. Thank you;    TECHNIQUE:  Low dose axial CT images obtained throughout the head without intravenous contrast. Sagittal and coronal reconstructions were performed.    COMPARISON:  CT head from 02/16/2024.    FINDINGS:  Ventricles and sulci are normal in size for age without evidence of hydrocephalus. No extra-axial blood or fluid collections.  Mild chronic microvascular ischemic changes, stable.  No parenchymal mass, hemorrhage, edema or major vascular distribution infarct.    No calvarial fracture.  The scalp is unremarkable.  Bilateral paranasal sinuses and mastoid air cells are clear.      Impression    No acute intracranial abnormality.      Electronically signed by: Kaden Payne  Date:    03/03/2024  Time:    10:04   Results for orders placed or performed during the hospital encounter of 06/10/22   MRI Brain Without Contrast    Narrative    EXAMINATION:  MRI BRAIN WITHOUT CONTRAST    CLINICAL HISTORY:  hx of PRES;    TECHNIQUE:  Multiplanar multisequence MR imaging of the brain was performed without intravenous contrast.    COMPARISON:  Head CT 06/10/2022, brain MRI 10/04/2017, 07/21/2017    FINDINGS:  Intracranial Compartment:    Ventricles are normal in size for age without evidence of  hydrocephalus.    Ill-defined focus T2-FLAIR signal hyperintensity in the right periatrial white matter.  Few additional scattered punctate foci elsewhere within the supratentorial white matter.  These appear similar to the prior study of 10/04/2017.  No definite new focal lesions.  No diffusion restriction to indicate an acute infarction.  No remote major vascular distribution infarct.  No recent or remote hemorrhage.  No mass effect or midline shift.    No extra-axial blood or fluid collections.    Normal vascular flow voids are preserved.    Skull/Extracranial Contents (limited evaluation):    Bone marrow signal intensity is normal.      Impression    No evidence of acute intracranial pathology.      Electronically signed by: Miguel Malagon MD  Date:    06/10/2022  Time:    09:45     *Note: Due to a large number of results and/or encounters for the requested time period, some results have not been displayed. A complete set of results can be found in Results Review.       CONSULTATION:     A diagnostic psychiatric evaluation was performed and responsiveness to treatment was assessed.  The patient demonstrates adequate ability/capacity to respond to treatment.    Inpatient consult to Psychiatry  Consult performed by: Surekha Oleary MD  Consult ordered by: Raman Urena MD          - The case was discussed and care was coordinated with the consulting clinician, including clinical impression, assessment, and treatment recommendations.

## 2024-04-12 NOTE — ASSESSMENT & PLAN NOTE
Malnutrition Type:  Context: social/environmental circumstances  Level: moderate    Related to (etiology):   Inadequate oral intake    Signs and Symptoms (as evidenced by):   Excessive ETOH intake in setting of starvation  Consumption of couple glasses vodka per day  Poor PO intake and appetite x past few months    Malnutrition Characteristic Summary:  Weight Loss (Malnutrition): 5% in 1 month  Energy Intake (Malnutrition): less than or equal to 50% for greater than or equal to 1 month  Subcutaneous Fat (Malnutrition): moderate depletion  Muscle Mass (Malnutrition): moderate depletion    Interventions/Recommendations (treatment strategy):  1. Continue DM 2000 kcal diet. 2. Add ONS Boost Glucose Control TID. [place] patient's flavor preference. 3. Consider folate and thiamine supplementation.4. Monitor for s/s of refeeding syndrome.    Nutrition Diagnosis Status:   New

## 2024-04-12 NOTE — CONSULTS
[facility] - Stepdown Flex ([facility]-)  Adult Nutrition  Consult Note    SUMMARY     Recommendations    1. Continue DM 2000 kcal diet.     2. Add ONS Boost Glucose Control TID. [place] patient's flavor preference.     3. Continue folate supplementation and consider thiamine supplementation.     4. Monitor for s/s of refeeding syndrome.    Goals: Meet % EEN by RD f/u.  Nutrition Goal Status: new  Communication of RD Recs: other (comment) (POC)    Assessment and Plan    Moderate malnutrition  Malnutrition Type:  Context: social/environmental circumstances  Level: moderate    Related to (etiology):   Inadequate oral intake    Signs and Symptoms (as evidenced by):   Excessive ETOH intake in setting of starvation  Consumption of couple glasses vodka per day  Poor PO intake and appetite x past few months    Malnutrition Characteristic Summary:  Weight Loss (Malnutrition): 5% in 1 month  Energy Intake (Malnutrition): less than or equal to 50% for greater than or equal to 1 month  Subcutaneous Fat (Malnutrition): moderate depletion  Muscle Mass (Malnutrition): moderate depletion    Interventions/Recommendations (treatment strategy):  1. Continue DM 2000 kcal diet. 2. Add ONS Boost Glucose Control TID. [place] patient's flavor preference. 3. Consider folate and thiamine supplementation.4. Monitor for s/s of refeeding syndrome.    Nutrition Diagnosis Status:   New    Malnutrition Assessment  Malnutrition Context: social/environmental circumstances  Malnutrition Level: moderate          Weight Loss (Malnutrition): 5% in 1 month  Energy Intake (Malnutrition): less than or equal to 50% for greater than or equal to 1 month  Subcutaneous Fat (Malnutrition): moderate depletion  Muscle Mass (Malnutrition): moderate depletion   Orbital Region (Subcutaneous Fat Loss): mild depletion  Upper Arm Region (Subcutaneous Fat Loss): severe depletion   Faith Region (Muscle Loss): moderate depletion  Clavicle Bone Region (Muscle Loss):  "moderate depletion  Dorsal Hand (Muscle Loss): well nourished  Patellar Region (Muscle Loss): moderate depletion  Anterior Thigh Region (Muscle Loss): mild depletion  Posterior Calf Region (Muscle Loss): mild depletion               Reason for Assessment    Reason For Assessment: consult  Diagnosis: other (see comments) (Alcohol withdrawal)  Relevant Medical History: DMT2, ETOH use disorder, CLL, COPD, HTN, depression, R breast cancer s/p bilateral mastectomy, hypothyroidism  Interdisciplinary Rounds: did not attend  General Information Comments: Consult for reduced intake x 1 month. Severe weight loss over the past month. Per chart, patient reports drinking a couple of glasses of vodka/d and goes through a few bottles per week. At risk for micronutrient deficiencies. Presents with N/fatigue. Reports vomiting yesterday (5x). Poor PO intake and appetite for the past few months reported. Prefers chocolate flavored ONS. NFPE performed. Denies increased hair loss or thinning.  Nutrition Discharge Planning: ONS, vit/nutrient supplementation    Nutrition Risk Screen    Nutrition Risk Screen: reduced oral intake over the last month    Nutrition/Diet History    Food Allergies: NKFA    Anthropometrics    Temp: 98.1 °F (36.7 °C)  Height Method: Stated  Height: 5' 6" (167.6 cm)  Height (inches): 66 in  Weight Method: Stated  Weight: 81.3 kg (179 lb 3.7 oz)  Weight (lb): 179.24 lb  Ideal Body Weight (IBW), Female: 130 lb  % Ideal Body Weight, Female (lb): 137.88 %  BMI (Calculated): 28.9  BMI Grade: 25 - 29.9 - overweight     Lab/Procedures/Meds    Pertinent Labs Reviewed: reviewed  Pertinent Labs Comments: (4/12) K 3.2 L, BUN 4 L, (4/11) RBC 3.93, H/H 10.2/34, Plt Cnt 86  Pertinent Medications Reviewed: reviewed  Pertinent Medications Comments: folic acid, insulin prn, zofran prn    Estimated/Assessed Needs    Weight Used For Calorie Calculations: 81.3 kg (179 lb 3.7 oz)  Energy Calorie Requirements (kcal): 2984-4153 kcal " (25-30 kcal/kg)  Energy Need Method: Kcal/kg  Protein Requirements: 81-98 g pro (1-1.2 g/kg)  Weight Used For Protein Calculations: 81.3 kg (179 lb 3.7 oz)  Fluid Requirements (mL): 1 mL/kcal or per MD     RDA Method (mL): 2033  CHO Requirement: 254 g CHO    Nutrition Prescription Ordered    Current Diet Order: DM 2000 kcal    Evaluation of Received Nutrient/Fluid Intake    I/O: SHERRY at this time, recent admit  Comments: LBM: 4/11  Tolerance: tolerating  % Intake of Estimated Energy Needs: 0 - 25 %  % Meal Intake: 0 - 25 %    Nutrition Risk    Level of Risk/Frequency of Follow-up:  (1x/wk)     Monitor and Evaluation    Food and Nutrient Intake: energy intake, food and beverage intake  Food and Nutrient Adminstration: diet order  Knowledge/Beliefs/Attitudes: food and nutrition knowledge/skill  Anthropometric Measurements: height/length, weight, weight change, body mass index  Biochemical Data, Medical Tests and Procedures: electrolyte and renal panel, gastrointestinal profile, glucose/endocrine profile, inflammatory profile, lipid profile  Nutrition-Focused Physical Findings: overall appearance, extremities, muscles and bones, head and eyes, skin     Nutrition Follow-Up    RD Follow-up?: Yes

## 2024-04-12 NOTE — PLAN OF CARE
Problem: Violence Risk or Actual  Goal: Anger and Impulse Control  Outcome: Ongoing, Progressing  Intervention: Promote Self-Control  Flowsheets (Taken 4/12/2024 0320)  Supportive Measures:   active listening utilized   counseling provided   decision-making supported   goal-setting facilitated   guided imagery facilitated   positive reinforcement provided   problem-solving facilitated   relaxation techniques promoted   self-care encouraged   self-reflection promoted   self-responsibility promoted   verbalization of feelings encouraged  Environmental Support:   calm environment promoted   caregiver consistency promoted   comfort object encouraged   distractions minimized   environmental consistency promoted   personal routine supported   rest periods encouraged   rooming-in facilitated     Problem: Adult Inpatient Plan of Care  Goal: Plan of Care Review  Outcome: Ongoing, Progressing  Flowsheets (Taken 4/12/2024 0320)  Plan of Care Reviewed With: patient  Goal: Patient-Specific Goal (Individualized)  Outcome: Ongoing, Progressing  Goal: Absence of Hospital-Acquired Illness or Injury  Outcome: Ongoing, Progressing  Goal: Optimal Comfort and Wellbeing  Outcome: Ongoing, Progressing  Intervention: Monitor Pain and Promote Comfort  Flowsheets (Taken 4/12/2024 0320)  Pain Management Interventions:   around-the-clock dosing utilized   care clustered   diversional activity provided   medication offered   pain management plan reviewed with patient/caregiver   pillow support provided   position adjusted   quiet environment facilitated   relaxation techniques promoted  Intervention: Provide Person-Centered Care  Flowsheets (Taken 4/12/2024 0320)  Trust Relationship/Rapport:   care explained   choices provided   emotional support provided   empathic listening provided   questions answered   questions encouraged   reassurance provided   thoughts/feelings acknowledged  Goal: Readiness for Transition of Care  Outcome: Ongoing,  Progressing     Problem: Diabetes Comorbidity  Goal: Blood Glucose Level Within Targeted Range  Outcome: Ongoing, Progressing  Intervention: Monitor and Manage Glycemia  Flowsheets (Taken 4/12/2024 0320)  Glycemic Management:   blood glucose monitored   oral glucose given     Problem: Fluid and Electrolyte Imbalance (Acute Kidney Injury/Impairment)  Goal: Fluid and Electrolyte Balance  Outcome: Ongoing, Progressing     Problem: Oral Intake Inadequate (Acute Kidney Injury/Impairment)  Goal: Optimal Nutrition Intake  Outcome: Ongoing, Progressing     Problem: Renal Function Impairment (Acute Kidney Injury/Impairment)  Goal: Effective Renal Function  Outcome: Ongoing, Progressing     Problem: Impaired Wound Healing  Goal: Optimal Wound Healing  Outcome: Ongoing, Progressing  Intervention: Promote Wound Healing  Flowsheets (Taken 4/12/2024 0320)  Oral Nutrition Promotion: rest periods promoted  Sleep/Rest Enhancement:   awakenings minimized   consistent schedule promoted   regular sleep/rest pattern promoted   relaxation techniques promoted   room darkened  Activity Management: Rolling - L1  Pain Management Interventions:   around-the-clock dosing utilized   care clustered   diversional activity provided   medication offered   pain management plan reviewed with patient/caregiver   pillow support provided   position adjusted   quiet environment facilitated   relaxation techniques promoted     Problem: Skin Injury Risk Increased  Goal: Skin Health and Integrity  Outcome: Ongoing, Progressing  Intervention: Promote and Optimize Oral Intake  Flowsheets (Taken 4/12/2024 0320)  Oral Nutrition Promotion: rest periods promoted

## 2024-04-12 NOTE — PLAN OF CARE
"Pt spouse called SW and stated that pt will be going to rehab for substance abuse.   SW went back to speak with pt about rehab once more and pt stated that she is" willing to go"and will be attending [facility] in [place].     Spouse wants MD to fax over pt medical records to facility.   Fax number is : [phone].    Will follow up.     Molly Henry MSW, CSW    "

## 2024-04-12 NOTE — NURSING
Patient A&OX4 overnight, VSS. Hypoglycemic event CBG 68 overnight- gave PRN glucose tabs CBG pulled up to 106, MD aware.     Q4 CIWA's performed, patient anxious/restless overnight requesting to call . Patient is a PEC, charge RN Eun notified- per Eun, we are unable to let patient speak with  at this time as she is PEC'd.    Patient requested something stronger than Melatonin to help her sleep overnight, MD added PRN Seroquel.    All questions, comments, concerns addressed overnight with patient.    Bed in lowest position, bed alarm on, bed locked, X2 side rails raised. Sitter remained at bedside overnight with patient.

## 2024-04-12 NOTE — NURSING
4..Nurses Note -- 4 Eyes      4/11/2024   7:22 PM      Skin assessed during: Admit      [x] No Altered Skin Integrity Present    []Prevention Measures Documented      [] Yes- Altered Skin Integrity Present or Discovered   [] LDA Added if Not in Epic (Describe Wound)   [] New Altered Skin Integrity was Present on Admit and Documented in LDA   [] Wound Image Taken    Wound Care Consulted? No    Attending Nurse:  Júnior Shepherd RN/Staff Member:  CLAUDIA Obrien

## 2024-04-12 NOTE — SUBJECTIVE & OBJECTIVE
Interval History: Possible dc home today after being seen by psych, stable, AG is closed, tolerating food.    Review of Systems  Objective:     Vital Signs (Most Recent):  Temp: 98.3 °F (36.8 °C) (04/12/24 0715)  Pulse: 65 (04/12/24 0945)  Resp: 18 (04/12/24 0715)  BP: (!) 155/74 (04/12/24 0715)  SpO2: 98 % (04/12/24 0945) Vital Signs (24h Range):  Temp:  [97.8 °F (36.6 °C)-99.6 °F (37.6 °C)] 98.3 °F (36.8 °C)  Pulse:  [] 65  Resp:  [16-18] 18  SpO2:  [91 %-100 %] 98 %  BP: (134-193)/(52-88) 155/74     Weight: 81.3 kg (179 lb 3.7 oz)  Body mass index is 28.93 kg/m².  No intake or output data in the 24 hours ending 04/12/24 1023      Physical Exam  Constitutional:       General: She is in acute distress.      Appearance: She is ill-appearing and diaphoretic.   HENT:      Head: Normocephalic.      Nose: Nose normal.      Mouth/Throat:      Mouth: Mucous membranes are dry.   Eyes:      Pupils: Pupils are equal, round, and reactive to light.   Cardiovascular:      Rate and Rhythm: Tachycardia present.   Pulmonary:      Effort: Pulmonary effort is normal.   Abdominal:      General: Abdomen is flat.   Musculoskeletal:         General: Normal range of motion.   Skin:     General: Skin is warm.   Neurological:      General: No focal deficit present.             Significant Labs: All pertinent labs within the past 24 hours have been reviewed.  BMP:   Recent Labs   Lab 04/12/24  0728   GLU 76      K 3.2*      CO2 27   BUN 4*   CREATININE 0.9   CALCIUM 8.7       Significant Imaging: I have reviewed all pertinent imaging results/findings within the past 24 hours.

## 2024-04-12 NOTE — PLAN OF CARE
Recommendations     1. Continue DM 2000 kcal diet.      2. Add ONS Boost Glucose Control TID. [place] patient's flavor preference.      3. Continue folate supplementation and consider thiamine supplementation.      4. Monitor for s/s of refeeding syndrome.     Goals: Meet % EEN by RD f/u.  Nutrition Goal Status: new  Communication of RD Recs: other (comment) (POC)

## 2024-04-12 NOTE — PLAN OF CARE
Patient is alert and oriented x 4. Speech is clear. Sleeping but easily aroused when called by name. CIWA q 4. On 1 liter / nc. Vs stable. LR infusing at 75cc/hr. Iv site healthy. No sign of distress. Safety measures in place. Pec in place. Bed in low position.       Problem: Violence Risk or Actual  Goal: Anger and Impulse Control  Outcome: Ongoing, Progressing     Problem: Adult Inpatient Plan of Care  Goal: Patient-Specific Goal (Individualized)  Outcome: Ongoing, Progressing  Goal: Absence of Hospital-Acquired Illness or Injury  Outcome: Ongoing, Progressing  Goal: Optimal Comfort and Wellbeing  Outcome: Ongoing, Progressing  Goal: Readiness for Transition of Care  Outcome: Ongoing, Progressing     Problem: Diabetes Comorbidity  Goal: Blood Glucose Level Within Targeted Range  Outcome: Ongoing, Progressing     Problem: Fluid and Electrolyte Imbalance (Acute Kidney Injury/Impairment)  Goal: Fluid and Electrolyte Balance  Outcome: Ongoing, Progressing     Problem: Oral Intake Inadequate (Acute Kidney Injury/Impairment)  Goal: Optimal Nutrition Intake  Outcome: Ongoing, Progressing     Problem: Renal Function Impairment (Acute Kidney Injury/Impairment)  Goal: Effective Renal Function  Outcome: Ongoing, Progressing      fair plus

## 2024-04-13 ENCOUNTER — HOSPITAL ENCOUNTER (EMERGENCY)
Facility: HOSPITAL | Age: 64
Discharge: HOME | End: 2024-04-14
Attending: STUDENT IN AN ORGANIZED HEALTH CARE EDUCATION/TRAINING PROGRAM
Payer: COMMERCIAL

## 2024-04-13 VITALS
SYSTOLIC BLOOD PRESSURE: 158 MMHG | HEART RATE: 65 BPM | BODY MASS INDEX: 28.81 KG/M2 | HEIGHT: 66 IN | RESPIRATION RATE: 18 BRPM | WEIGHT: 179.25 LBS | TEMPERATURE: 99 F | DIASTOLIC BLOOD PRESSURE: 70 MMHG | OXYGEN SATURATION: 92 %

## 2024-04-13 DIAGNOSIS — R10.9 ABDOMINAL PAIN: ICD-10-CM

## 2024-04-13 DIAGNOSIS — E83.42 HYPOMAGNESEMIA: Primary | ICD-10-CM

## 2024-04-13 LAB
ALBUMIN SERPL BCP-MCNC: 3.4 G/DL (ref 3.5–5.2)
ALBUMIN SERPL BCP-MCNC: 4.3 G/DL (ref 3.5–5.2)
ALP SERPL-CCNC: 46 U/L (ref 55–135)
ALT SERPL W/O P-5'-P-CCNC: 30 U/L (ref 10–44)
ANION GAP SERPL CALC-SCNC: 15 MMOL/L (ref 8–16)
ANION GAP SERPL CALC-SCNC: 9 MMOL/L (ref 8–16)
AST SERPL-CCNC: 49 U/L (ref 10–40)
BASOPHILS # BLD AUTO: 0.02 K/UL (ref 0–0.2)
BASOPHILS NFR BLD: 0.3 % (ref 0–1.9)
BILIRUB SERPL-MCNC: 0.4 MG/DL (ref 0.1–1)
BNP SERPL-MCNC: 134 PG/ML (ref 0–99)
BUN SERPL-MCNC: 3 MG/DL (ref 8–23)
BUN SERPL-MCNC: 3 MG/DL (ref 8–23)
CALCIUM SERPL-MCNC: 8.7 MG/DL (ref 8.7–10.5)
CALCIUM SERPL-MCNC: 9.4 MG/DL (ref 8.7–10.5)
CHLORIDE SERPL-SCNC: 102 MMOL/L (ref 95–110)
CHLORIDE SERPL-SCNC: 103 MMOL/L (ref 95–110)
CO2 SERPL-SCNC: 20 MMOL/L (ref 23–29)
CO2 SERPL-SCNC: 29 MMOL/L (ref 23–29)
CREAT SERPL-MCNC: 0.8 MG/DL (ref 0.5–1.4)
CREAT SERPL-MCNC: 0.8 MG/DL (ref 0.5–1.4)
DIFFERENTIAL METHOD BLD: ABNORMAL
EOSINOPHIL # BLD AUTO: 0 K/UL (ref 0–0.5)
EOSINOPHIL NFR BLD: 0.5 % (ref 0–8)
ERYTHROCYTE [DISTWIDTH] IN BLOOD BY AUTOMATED COUNT: 20.4 % (ref 11.5–14.5)
EST. GFR  (NO RACE VARIABLE): >60 ML/MIN/1.73 M^2
EST. GFR  (NO RACE VARIABLE): >60 ML/MIN/1.73 M^2
ETHANOL SERPL-MCNC: 112 MG/DL
GLUCOSE SERPL-MCNC: 119 MG/DL (ref 70–110)
GLUCOSE SERPL-MCNC: 96 MG/DL (ref 70–110)
HCT VFR BLD AUTO: 38.4 % (ref 37–48.5)
HGB BLD-MCNC: 11.2 G/DL (ref 12–16)
IMM GRANULOCYTES # BLD AUTO: 0.03 K/UL (ref 0–0.04)
IMM GRANULOCYTES NFR BLD AUTO: 0.4 % (ref 0–0.5)
LIPASE SERPL-CCNC: 16 U/L (ref 4–60)
LYMPHOCYTES # BLD AUTO: 4.6 K/UL (ref 1–4.8)
LYMPHOCYTES NFR BLD: 62.5 % (ref 18–48)
MAGNESIUM SERPL-MCNC: 1.2 MG/DL (ref 1.6–2.6)
MAGNESIUM SERPL-MCNC: 1.4 MG/DL (ref 1.6–2.6)
MCH RBC QN AUTO: 26 PG (ref 27–31)
MCHC RBC AUTO-ENTMCNC: 29.2 G/DL (ref 32–36)
MCV RBC AUTO: 89 FL (ref 82–98)
MONOCYTES # BLD AUTO: 0.3 K/UL (ref 0.3–1)
MONOCYTES NFR BLD: 4.5 % (ref 4–15)
NEUTROPHILS # BLD AUTO: 2.3 K/UL (ref 1.8–7.7)
NEUTROPHILS NFR BLD: 31.8 % (ref 38–73)
NRBC BLD-RTO: 0 /100 WBC
PHOSPHATE SERPL-MCNC: 2.5 MG/DL (ref 2.7–4.5)
PLATELET # BLD AUTO: 89 K/UL (ref 150–450)
PMV BLD AUTO: 10.5 FL (ref 9.2–12.9)
POCT GLUCOSE: 136 MG/DL (ref 70–110)
POTASSIUM SERPL-SCNC: 3.3 MMOL/L (ref 3.5–5.1)
POTASSIUM SERPL-SCNC: 4.8 MMOL/L (ref 3.5–5.1)
PROT SERPL-MCNC: 7.3 G/DL (ref 6–8.4)
RBC # BLD AUTO: 4.3 M/UL (ref 4–5.4)
SODIUM SERPL-SCNC: 138 MMOL/L (ref 136–145)
SODIUM SERPL-SCNC: 140 MMOL/L (ref 136–145)
TROPONIN I SERPL DL<=0.01 NG/ML-MCNC: <0.006 NG/ML (ref 0–0.03)
TSH SERPL DL<=0.005 MIU/L-ACNC: 1.39 UIU/ML (ref 0.4–4)
WBC # BLD AUTO: 7.33 K/UL (ref 3.9–12.7)

## 2024-04-13 PROCEDURE — 63600175 PHARM REV CODE 636 W HCPCS: Performed by: INTERNAL MEDICINE

## 2024-04-13 PROCEDURE — 82077 ASSAY SPEC XCP UR&BREATH IA: CPT | Performed by: STUDENT IN AN ORGANIZED HEALTH CARE EDUCATION/TRAINING PROGRAM

## 2024-04-13 PROCEDURE — 84443 ASSAY THYROID STIM HORMONE: CPT | Performed by: STUDENT IN AN ORGANIZED HEALTH CARE EDUCATION/TRAINING PROGRAM

## 2024-04-13 PROCEDURE — 83735 ASSAY OF MAGNESIUM: CPT | Performed by: HOSPITALIST

## 2024-04-13 PROCEDURE — 63600175 PHARM REV CODE 636 W HCPCS: Performed by: HOSPITALIST

## 2024-04-13 PROCEDURE — 99284 EMERGENCY DEPT VISIT MOD MDM: CPT | Mod: 25

## 2024-04-13 PROCEDURE — 25000003 PHARM REV CODE 250: Performed by: HOSPITALIST

## 2024-04-13 PROCEDURE — 83880 ASSAY OF NATRIURETIC PEPTIDE: CPT | Performed by: STUDENT IN AN ORGANIZED HEALTH CARE EDUCATION/TRAINING PROGRAM

## 2024-04-13 PROCEDURE — 25000003 PHARM REV CODE 250: Performed by: INTERNAL MEDICINE

## 2024-04-13 PROCEDURE — 93010 ELECTROCARDIOGRAM REPORT: CPT | Mod: ,,, | Performed by: INTERNAL MEDICINE

## 2024-04-13 PROCEDURE — 83735 ASSAY OF MAGNESIUM: CPT | Mod: 91 | Performed by: STUDENT IN AN ORGANIZED HEALTH CARE EDUCATION/TRAINING PROGRAM

## 2024-04-13 PROCEDURE — 84484 ASSAY OF TROPONIN QUANT: CPT | Performed by: STUDENT IN AN ORGANIZED HEALTH CARE EDUCATION/TRAINING PROGRAM

## 2024-04-13 PROCEDURE — 96361 HYDRATE IV INFUSION ADD-ON: CPT

## 2024-04-13 PROCEDURE — 85025 COMPLETE CBC W/AUTO DIFF WBC: CPT | Performed by: STUDENT IN AN ORGANIZED HEALTH CARE EDUCATION/TRAINING PROGRAM

## 2024-04-13 PROCEDURE — 36415 COLL VENOUS BLD VENIPUNCTURE: CPT | Performed by: HOSPITALIST

## 2024-04-13 PROCEDURE — 93005 ELECTROCARDIOGRAM TRACING: CPT

## 2024-04-13 PROCEDURE — 80069 RENAL FUNCTION PANEL: CPT | Performed by: HOSPITALIST

## 2024-04-13 PROCEDURE — 80053 COMPREHEN METABOLIC PANEL: CPT | Performed by: STUDENT IN AN ORGANIZED HEALTH CARE EDUCATION/TRAINING PROGRAM

## 2024-04-13 PROCEDURE — 96375 TX/PRO/DX INJ NEW DRUG ADDON: CPT

## 2024-04-13 PROCEDURE — 83690 ASSAY OF LIPASE: CPT | Performed by: STUDENT IN AN ORGANIZED HEALTH CARE EDUCATION/TRAINING PROGRAM

## 2024-04-13 PROCEDURE — 63600175 PHARM REV CODE 636 W HCPCS: Performed by: STUDENT IN AN ORGANIZED HEALTH CARE EDUCATION/TRAINING PROGRAM

## 2024-04-13 RX ORDER — ONDANSETRON HYDROCHLORIDE 2 MG/ML
4 INJECTION, SOLUTION INTRAVENOUS
Status: COMPLETED | OUTPATIENT
Start: 2024-04-13 | End: 2024-04-13

## 2024-04-13 RX ORDER — DIAZEPAM 5 MG/1
5 TABLET ORAL EVERY 6 HOURS PRN
Qty: 20 TABLET | Refills: 0 | Status: SHIPPED | OUTPATIENT
Start: 2024-04-13 | End: 2024-05-17

## 2024-04-13 RX ADMIN — ONDANSETRON 4 MG: 2 INJECTION INTRAMUSCULAR; INTRAVENOUS at 09:04

## 2024-04-13 RX ADMIN — ESCITALOPRAM OXALATE 20 MG: 5 TABLET, FILM COATED ORAL at 09:04

## 2024-04-13 RX ADMIN — ONDANSETRON 4 MG: 2 INJECTION INTRAMUSCULAR; INTRAVENOUS at 04:04

## 2024-04-13 RX ADMIN — DIAZEPAM 5 MG: 10 INJECTION, SOLUTION INTRAMUSCULAR; INTRAVENOUS at 04:04

## 2024-04-13 RX ADMIN — METOPROLOL SUCCINATE 50 MG: 50 TABLET, EXTENDED RELEASE ORAL at 09:04

## 2024-04-13 RX ADMIN — PANTOPRAZOLE SODIUM 40 MG: 40 TABLET, DELAYED RELEASE ORAL at 09:04

## 2024-04-13 RX ADMIN — POTASSIUM CHLORIDE 40 MEQ: 1500 TABLET, EXTENDED RELEASE ORAL at 09:04

## 2024-04-13 RX ADMIN — ACETAMINOPHEN 650 MG: 325 TABLET ORAL at 04:04

## 2024-04-13 RX ADMIN — SODIUM CHLORIDE, POTASSIUM CHLORIDE, SODIUM LACTATE AND CALCIUM CHLORIDE 1000 ML: 600; 310; 30; 20 INJECTION, SOLUTION INTRAVENOUS at 10:04

## 2024-04-13 RX ADMIN — LEVOTHYROXINE SODIUM 75 MCG: 75 TABLET ORAL at 06:04

## 2024-04-13 RX ADMIN — ONDANSETRON 4 MG: 2 INJECTION INTRAMUSCULAR; INTRAVENOUS at 10:04

## 2024-04-13 RX ADMIN — FOLIC ACID 1 MG: 1 TABLET ORAL at 09:04

## 2024-04-13 RX ADMIN — APIXABAN 5 MG: 5 TABLET, FILM COATED ORAL at 09:04

## 2024-04-13 RX ADMIN — DIAZEPAM 10 MG: 5 TABLET ORAL at 02:04

## 2024-04-13 NOTE — NURSING
"Patient A&OX4 at beginning of shift, around 4AM patient became disoriented to the year only stated "it's 1922." Patient was reoriented at this time to current year.    PEC discontinued 4/12, Q4 CIWA's performed overnight, PO valium administered, scheduled Trazodone added for sleep aide.    CIWA score 18 @4AM, PRN IV 5mg Valium administered; score down to 4; MD pat, RN asked if ok to administer PO 10mg Valium at 6AM; per MD Silver, "hold it for now given she got IV valium at 4:30."     This nurse will continue to monitor patient closely.    All questions, comments, concerns addressed overnight with patient.     Bed in lowest position, bed alarm on, bed locked, X2 side rails raised, suction at bedside, seizure precautions maintained.  "

## 2024-04-13 NOTE — PLAN OF CARE
Patient is alert and oriented x 4. Speech is clear. Ambulates to bathroom independently. On o2 intermittently. Lowest sat 88%. Zofran 4 mg given iv for nausea and effective. Vs stable. Compliant with meds. Safety measures in place. Bed in low position. Call light in reach.      Problem: Violence Risk or Actual  Goal: Anger and Impulse Control  Outcome: Ongoing, Progressing     Problem: Adult Inpatient Plan of Care  Goal: Plan of Care Review  Outcome: Ongoing, Progressing  Goal: Patient-Specific Goal (Individualized)  Outcome: Ongoing, Progressing  Goal: Absence of Hospital-Acquired Illness or Injury  Outcome: Ongoing, Progressing  Goal: Optimal Comfort and Wellbeing  Outcome: Ongoing, Progressing  Goal: Readiness for Transition of Care  Outcome: Ongoing, Progressing     Problem: Diabetes Comorbidity  Goal: Blood Glucose Level Within Targeted Range  Outcome: Ongoing, Progressing

## 2024-04-13 NOTE — PLAN OF CARE
[facility] - Stepdown Flex ([place])  Discharge Final Note    Primary Care Provider: Andrew Rodriguez MD    Expected Discharge Date: 4/13/2024    Final Discharge Note (most recent)       Final Note - 04/13/24 0915          Final Note    Assessment Type Final Discharge Note (P)      Anticipated Discharge Disposition Home or Self Care (P)      What phone number can be called within the next 1-3 days to see how you are doing after discharge? [phone] (P)      Hospital Resources/Appts/Education Provided Provided patient/caregiver with written discharge plan information;Appointments scheduled and added to AVS (P)         Post-Acute Status    Coverage [facility] (P)      Discharge Delays None known at this time (P)                    Patient is discharging home. Pt states she will then go to substance abuse rehab in [place] and she and her family are planning that personally. SW sent med records to the rehab per Pt's request. Pt's hospital follow up appointments are scheduled and in the Pt's AVS.     Pt has no other post acute needs and is cleared for discharge from a case management standpoint.     KHURRAM Laura, VARUN  [facility]  [id number]               Subjective:   50 y.o. female for Well Woman Check. No LMP recorded. Patient is postmenopausal.    Social History: not sexually active. Pertinent past medical hstory: hypertension, current smoker, no history of DVT, CAD, DM, liver disease, or migraines. Current Outpatient Prescriptions   Medication Sig Dispense Refill    meloxicam (MOBIC) 15 mg tablet Take 1 Tab by mouth daily.  gabapentin (NEURONTIN) 300 mg capsule Take 300 mg by mouth three (3) times daily. Allergies   Allergen Reactions    Codeine Anaphylaxis    Pcn [Penicillins] Rash     Past Medical History:   Diagnosis Date    Anxiety     Depression      History reviewed. No pertinent surgical history. Family History   Problem Relation Age of Onset    Diabetes Mother     Diabetes Father     Cancer Father      skin,colon    Stroke Father     Heart Disease Father      Social History   Substance Use Topics    Smoking status: Current Every Day Smoker    Smokeless tobacco: Never Used    Alcohol use Yes        ROS:  Feeling well. No dyspnea or chest pain on exertion. No abdominal pain, change in bowel habits, black or bloody stools. She complains of very frequent urination both during the day and at night, with occasional urge incontinence. GYN ROS: no breast pain or new or enlarging lumps on self exam, no vaginal bleeding, no discharge or pelvic pain, no hot flashes. No neurological complaints. Objective:     Visit Vitals    /82    Pulse 88    Ht 5' 2\" (1.575 m)    Wt 233 lb (105.7 kg)    BMI 42.62 kg/m2     The patient appears well, alert, oriented x 3, in no distress. ENT normal.  Neck supple. No adenopathy or thyromegaly. ALTON. Lungs are clear, good air entry, no wheezes, rhonchi or rales. S1 and S2 normal, no murmurs, regular rate and rhythm. Abdomen soft without tenderness, guarding, mass or organomegaly. Extremities show no edema, normal peripheral pulses. Neurological is normal, no focal findings.     BREAST EXAM: breasts appear normal, no suspicious masses, no skin or nipple changes or axillary nodes    PELVIC EXAM: normal external genitalia, vulva, vagina, cervix, uterus and adnexa, exam limited by obesity, PAP: Pap smear done today, HPV test    UA: SG 1.025, 3+ blood, 1+ protein    Assessment/Plan:   well woman  Polyuria, hematuria. Recommend urology referral for further evaluation. mammogram  pap smear  return annually or prn  Plan of care discussed. Patient expressed understanding.

## 2024-04-13 NOTE — PLAN OF CARE
Problem: Violence Risk or Actual  Goal: Anger and Impulse Control  Outcome: Ongoing, Progressing     Problem: Adult Inpatient Plan of Care  Goal: Plan of Care Review  Outcome: Ongoing, Progressing  Goal: Patient-Specific Goal (Individualized)  Outcome: Ongoing, Progressing  Goal: Absence of Hospital-Acquired Illness or Injury  Outcome: Ongoing, Progressing  Goal: Optimal Comfort and Wellbeing  Outcome: Ongoing, Progressing  Goal: Readiness for Transition of Care  Outcome: Ongoing, Progressing     Problem: Diabetes Comorbidity  Goal: Blood Glucose Level Within Targeted Range  Outcome: Ongoing, Progressing     Problem: Fluid and Electrolyte Imbalance (Acute Kidney Injury/Impairment)  Goal: Fluid and Electrolyte Balance  Outcome: Ongoing, Progressing     Problem: Oral Intake Inadequate (Acute Kidney Injury/Impairment)  Goal: Optimal Nutrition Intake  Outcome: Ongoing, Progressing     Problem: Renal Function Impairment (Acute Kidney Injury/Impairment)  Goal: Effective Renal Function  Outcome: Ongoing, Progressing     Problem: Impaired Wound Healing  Goal: Optimal Wound Healing  Outcome: Ongoing, Progressing     Problem: Skin Injury Risk Increased  Goal: Skin Health and Integrity  Outcome: Ongoing, Progressing

## 2024-04-14 VITALS
WEIGHT: 180 LBS | HEART RATE: 79 BPM | SYSTOLIC BLOOD PRESSURE: 134 MMHG | RESPIRATION RATE: 20 BRPM | DIASTOLIC BLOOD PRESSURE: 94 MMHG | HEIGHT: 66 IN | OXYGEN SATURATION: 96 % | TEMPERATURE: 99 F | BODY MASS INDEX: 28.93 KG/M2

## 2024-04-14 LAB
BILIRUB UR QL STRIP: NEGATIVE
CLARITY UR REFRACT.AUTO: CLEAR
COLOR UR AUTO: COLORLESS
GLUCOSE UR QL STRIP: NEGATIVE
HGB UR QL STRIP: NEGATIVE
KETONES UR QL STRIP: NEGATIVE
LEUKOCYTE ESTERASE UR QL STRIP: ABNORMAL
MICROSCOPIC COMMENT: NORMAL
NITRITE UR QL STRIP: NEGATIVE
OHS QRS DURATION: 84 MS
OHS QTC CALCULATION: 433 MS
PH UR STRIP: 7 [PH] (ref 5–8)
PROT UR QL STRIP: NEGATIVE
RBC #/AREA URNS AUTO: 2 /HPF (ref 0–4)
SP GR UR STRIP: 1.01 (ref 1–1.03)
SQUAMOUS #/AREA URNS AUTO: 0 /HPF
URN SPEC COLLECT METH UR: ABNORMAL
WBC #/AREA URNS AUTO: 4 /HPF (ref 0–5)

## 2024-04-14 PROCEDURE — 25000003 PHARM REV CODE 250: Performed by: EMERGENCY MEDICINE

## 2024-04-14 PROCEDURE — 96366 THER/PROPH/DIAG IV INF ADDON: CPT

## 2024-04-14 PROCEDURE — 63600175 PHARM REV CODE 636 W HCPCS: Performed by: STUDENT IN AN ORGANIZED HEALTH CARE EDUCATION/TRAINING PROGRAM

## 2024-04-14 PROCEDURE — 81001 URINALYSIS AUTO W/SCOPE: CPT | Performed by: STUDENT IN AN ORGANIZED HEALTH CARE EDUCATION/TRAINING PROGRAM

## 2024-04-14 PROCEDURE — 96365 THER/PROPH/DIAG IV INF INIT: CPT

## 2024-04-14 RX ORDER — TRAZODONE HYDROCHLORIDE 50 MG/1
200 TABLET ORAL NIGHTLY
Status: DISCONTINUED | OUTPATIENT
Start: 2024-04-14 | End: 2024-04-14 | Stop reason: HOSPADM

## 2024-04-14 RX ORDER — DIAZEPAM 2 MG/1
2 TABLET ORAL
Status: COMPLETED | OUTPATIENT
Start: 2024-04-14 | End: 2024-04-14

## 2024-04-14 RX ORDER — PROCHLORPERAZINE EDISYLATE 5 MG/ML
10 INJECTION INTRAMUSCULAR; INTRAVENOUS
Status: COMPLETED | OUTPATIENT
Start: 2024-04-14 | End: 2024-04-14

## 2024-04-14 RX ORDER — MAGNESIUM SULFATE HEPTAHYDRATE 40 MG/ML
2 INJECTION, SOLUTION INTRAVENOUS ONCE
Status: COMPLETED | OUTPATIENT
Start: 2024-04-14 | End: 2024-04-14

## 2024-04-14 RX ADMIN — PROCHLORPERAZINE EDISYLATE 10 MG: 5 INJECTION INTRAMUSCULAR; INTRAVENOUS at 12:04

## 2024-04-14 RX ADMIN — TRAZODONE HYDROCHLORIDE 200 MG: 50 TABLET ORAL at 01:04

## 2024-04-14 RX ADMIN — MAGNESIUM SULFATE HEPTAHYDRATE 2 G: 40 INJECTION, SOLUTION INTRAVENOUS at 12:04

## 2024-04-14 RX ADMIN — DIAZEPAM 2 MG: 2 TABLET ORAL at 01:04

## 2024-04-14 NOTE — PROVIDER PROGRESS NOTES - EMERGENCY DEPT.
"Encounter Date: 4/13/2024    ED Physician Progress Notes        I assumed care of patient at sign out pending: UA, mag to finish.    Briefly, Just admitted for etoh w/d. Went home today and drank a pint of vodka. Intoxicated tamica rrival. Labs okay. Getting Mag IV. Waiting on UA. Has walked here and stable. SPD can bring her home. Stable for Dc home    Has leuk esterase on UA but would not consider this acute cystitis or infection.  Stable for discharge.    Summary of Results:    Vitals:    04/13/24 2116 04/14/24 0000   BP: 128/66 (!) 142/64   Pulse: 75 73   Resp: 18 19   Temp: 99.1 °F (37.3 °C)    TempSrc: Oral    SpO2: 97% 95%   Weight: 81.6 kg (180 lb)    Height: 5' 6" (1.676 m)      Labs Reviewed   CBC W/ AUTO DIFFERENTIAL - Abnormal; Notable for the following components:       Result Value    Hemoglobin 11.2 (*)     MCH 26.0 (*)     MCHC 29.2 (*)     RDW 20.4 (*)     Platelets 89 (*)     Gran % 31.8 (*)     Lymph % 62.5 (*)     All other components within normal limits   COMPREHENSIVE METABOLIC PANEL - Abnormal; Notable for the following components:    CO2 20 (*)     BUN 3 (*)     Alkaline Phosphatase 46 (*)     AST 49 (*)     All other components within normal limits   MAGNESIUM - Abnormal; Notable for the following components:    Magnesium 1.2 (*)     All other components within normal limits   B-TYPE NATRIURETIC PEPTIDE - Abnormal; Notable for the following components:     (*)     All other components within normal limits   ALCOHOL,MEDICAL (ETHANOL) - Abnormal; Notable for the following components:    Alcohol, Serum 112 (*)     All other components within normal limits   URINALYSIS, REFLEX TO URINE CULTURE - Abnormal; Notable for the following components:    Color, UA Colorless (*)     Leukocytes, UA 2+ (*)     All other components within normal limits   LIPASE   TROPONIN I   TSH   URINALYSIS MICROSCOPIC   URINALYSIS, REFLEX TO URINE CULTURE     .    Disposition:  discharge.    "

## 2024-04-14 NOTE — ED TRIAGE NOTES
"Pt c/o N/V and weakness x 2 days. Pt states she was seen here earlier today for the same symptoms and d/c. Pt states she's not sure where she is or what the year is. Pt also states "I think I was here for a few days. I'm so sick." Pt reports hx leukemia, but states last chemo "around a year ago." Pt appears lethargic. When asked if she lives with anyone at home she said her  left her a week ago and that she is an alcoholic. Pt states she had a half a bottle of vodka two hours ago. Denies SI/HI.  "

## 2024-04-14 NOTE — ED PROVIDER NOTES
Encounter Date: 4/13/2024       History     Chief Complaint   Patient presents with    Abdominal Pain     This this AM     HPI    63-year-old woman with EtOH use complicated by alcohol withdrawal seizures, CLL, COPD, depression with history of suicide attempt, bilateral mastectomy, hypothyroidism, DM 2, presenting with EtOH intoxication, reporting abdominal pain, nausea/vomiting, reports she drank a pt of vodka at 1:00 p.m after she was discharged from the hospital today.    Patient is known to the addiction Medicine Service, has been PEC'd frequently for attempting to leave AMA while intoxicated, was admitted to the hospital for 2 days for alcohol withdrawal, seen by Psychiatry, cleared, and discharge this morning.    Patient reports that she is interested in going to rehab, has plans to go to a rehab facility in [place] that she is gone to previously.  Has been undergoing a separation from her  of > 30 years.    While in the ER, patient had a near syncopal episode when attempting to ambulate, which she does not have memory of on my exam.  Initially before her near syncopal episode, was A&O x1.  Unreliable historian, denying any history of suicide attempts, denying any history of nausea/vomiting/abdominal pain today on my 2nd assessment.  Requesting turkey sandwich and diet Coke.  Denies headache, denies back pain or leg pain, denies any recent falls.    Reports compliance with her medications for depression    Review of patient's allergies indicates:   Allergen Reactions    Lortab [hydrocodone-acetaminophen] Itching    Promethazine Itching and Other (See Comments)     Past Medical History:   Diagnosis Date    Alcoholism     c/b alcohol withdrawl seizures 7/2017    Anemia     Cancer of breast 10/2020    s/p bilateral mastectomy for  T1b N0 stage IA breast cancer October 2020    CLL (chronic lymphocytic leukemia) 09/30/2022 6/22/22 - PB flow cytometry  Immunophenotyping of peripheral blood detects a  distinct kappa light chain restricted monoclonal B-cell population  (calculated at 2.44x10 9 /L, from the most recent CBC showing a total WBC of  7.35 K/uL with 61% total lymphocytes)  with a CLL phenotype (coexpression of CD19, CD5, CD23 and dim CD20). CD22 (FITC), FMC-7 and CD38 are negative in this population.    Controlled type 2 diabetes mellitus without complication, without long-term current use of insulin 11/30/2021    COPD (chronic obstructive pulmonary disease)     Depression     Diverticulitis     Fatty liver     GERD (gastroesophageal reflux disease)     Hyperlipidemia     Hypertension     Pancreatitis     Peptic ulcer disease     Polysubstance abuse     Posterior reversible encephalopathy syndrome     Sarcoidosis of lung     over 30 yrs ago    Suicide attempt      Past Surgical History:   Procedure Laterality Date    APPENDECTOMY      BILATERAL MASTECTOMY Bilateral 10/29/2020    Procedure: MASTECTOMY, BILATERAL;  Surgeon: Baylee Kevin MD;  Location: [facility] OR [address] [place] [address];  Service: General;  Laterality: Bilateral;    BREAST REVISION SURGERY Bilateral 2/11/2021    Procedure: BREAST REVISION SURGERY;  Surgeon: Scottie Johnson MD;  Location: [facility] OR [address] [place] [address];  Service: Plastics;  Laterality: Bilateral;    COLONOSCOPY N/A 7/28/2017    Procedure: COLONOSCOPY;  Surgeon: Aaron Alvarado MD;  Location: [facility];  Service: Endoscopy;  Laterality: N/A;    ESOPHAGOGASTRODUODENOSCOPY  10/7/2016, 11/6/2014    2016 - gastritis, duodenitis, 2014 erosive gastritis    ESOPHAGOGASTRODUODENOSCOPY N/A 2/11/2020    Procedure: ESOPHAGOGASTRODUODENOSCOPY (EGD);  Surgeon: Fwan Garrido MD;  Location: [facility];  Service: Endoscopy;  Laterality: N/A;    ESOPHAGOGASTRODUODENOSCOPY N/A 4/19/2021    Procedure: EGD (ESOPHAGOGASTRODUODENOSCOPY);  Surgeon: Paramjit Martino MD;  Location: [facility];  Service: Endoscopy;  Laterality: N/A;    FLEXIBLE SIGMOIDOSCOPY  11/06/2014    colitis    HYSTERECTOMY      IMPLANTATION OF  PERMANENT SACRAL NERVE STIMULATOR N/A 7/12/2022    Procedure: INSERTION, NEUROSTIMULATOR, PERMANENT, SACRAL;  Surgeon: Juaquin Edwards MD;  Location: [facility] OR [address] [place] [address];  Service: Urology;  Laterality: N/A;  1hr    INJECTION FOR SENTINEL NODE IDENTIFICATION Right 10/29/2020    Procedure: INJECTION, FOR SENTINEL NODE IDENTIFICATION;  Surgeon: Baylee Kevin MD;  Location: [address];  Service: General;  Laterality: Right;    INJECTION OF JOINT Right 10/10/2019    Procedure: Injection, Joint RIGHT ILIOPSOAS BURSA/TENDON INJECTION AND RIGHT GLUTEAL TENDON INJECTION WITH STEROID AND LIDOCAINE;  Surgeon: Guillaume Rico MD;  Location: [facility]IN [facility];  Service: Pain Management;  Laterality: Right;  NEEDS CONSENT    INSERTION OF BREAST TISSUE EXPANDER Bilateral 10/29/2020    Procedure: INSERTION, TISSUE EXPANDER, BREAST;  Surgeon: Scottie Johnson MD;  Location: [address];  Service: Plastics;  Laterality: Bilateral;  Right breast: 1082 g  Left breast: 1076 g    LIPOSUCTION Bilateral 2/11/2021    Procedure: LIPOSUCTION;  Surgeon: Scottie Johnson MD;  Location: [address];  Service: Plastics;  Laterality: Bilateral;    mediastenoscopy      REPLACEMENT OF IMPLANT OF BREAST Bilateral 2/11/2021    Procedure: REPLACEMENT, IMPLANT, BREAST;  Surgeon: Scottie Johnson MD;  Location: [address];  Service: Plastics;  Laterality: Bilateral;    SENTINEL LYMPH NODE BIOPSY Right 10/29/2020    Procedure: BIOPSY, LYMPH NODE, SENTINEL;  Surgeon: Baylee Kevin MD;  Location: [address];  Service: General;  Laterality: Right;    TONSILLECTOMY N/A 1970    TUBAL LIGATION       Family History   Problem Relation Name Age of Onset    Heart attack Father      Diabetes Father      Hypertension Father      Diabetes Mother      Hypertension Mother      Breast cancer Maternal Aunt      Colon cancer Maternal Uncle      Breast cancer Daughter      Ovarian cancer Neg Hx      Cancer Neg Hx       Social History      Tobacco Use    Smoking status: Every Day     Current packs/day: 0.00     Average packs/day: 0.5 packs/day for 30.0 years (15.0 ttl pk-yrs)     Types: Vaping with nicotine, Cigarettes     Start date: 2/1/1991     Last attempt to quit: 2/1/2021     Years since quitting: 3.2    Smokeless tobacco: Never    Tobacco comments:     Patient is currently smoking 10 cigarettes a day, declines nicotine patches   Substance Use Topics    Alcohol use: Yes     Comment: vodka daily (half a regular bottle) for 4 days    Drug use: Yes     Types: Marijuana     Comment: gummies     Review of Systems    Physical Exam     Initial Vitals [04/13/24 2116]   BP Pulse Resp Temp SpO2   128/66 75 18 99.1 °F (37.3 °C) 97 %      MAP       --         Physical Exam    Nursing note and vitals reviewed.  Constitutional: She appears well-developed and well-nourished.   HENT:   Head: Normocephalic and atraumatic.   No facial bony tenderness. No scalp hematoma.    Eyes: EOM are normal.   Neck:   Normal range of motion.  Cardiovascular:  Normal rate and regular rhythm.           Pulmonary/Chest: No respiratory distress.   No chest tenderness to compression.    Abdominal: Abdomen is soft. She exhibits no distension.   Nontender abdomen to deep palpation, no abdominal ecchymosis   Musculoskeletal:         General: No tenderness. Normal range of motion.      Cervical back: Normal range of motion.      Comments: No cervical, thoracic, or lumbar tenderness. No bony tenderness to all extremities. Ambulatory at baseline. Normal flexion and extension of all extremities.      Neurological: She is alert. GCS score is 15. GCS eye subscore is 4. GCS verbal subscore is 5. GCS motor subscore is 6.   Skin: Skin is warm and dry.   No abrasion or laceration noted.    Psychiatric: She has a normal mood and affect. Thought content normal.         ED Course   Procedures  Labs Reviewed   CBC W/ AUTO DIFFERENTIAL - Abnormal; Notable for the following components:        Result Value    Hemoglobin 11.2 (*)     MCH 26.0 (*)     MCHC 29.2 (*)     RDW 20.4 (*)     Platelets 89 (*)     Gran % 31.8 (*)     Lymph % 62.5 (*)     All other components within normal limits   COMPREHENSIVE METABOLIC PANEL - Abnormal; Notable for the following components:    CO2 20 (*)     BUN 3 (*)     Alkaline Phosphatase 46 (*)     AST 49 (*)     All other components within normal limits   MAGNESIUM - Abnormal; Notable for the following components:    Magnesium 1.2 (*)     All other components within normal limits   B-TYPE NATRIURETIC PEPTIDE - Abnormal; Notable for the following components:     (*)     All other components within normal limits   ALCOHOL,MEDICAL (ETHANOL) - Abnormal; Notable for the following components:    Alcohol, Serum 112 (*)     All other components within normal limits   URINALYSIS, REFLEX TO URINE CULTURE - Abnormal; Notable for the following components:    Color, UA Colorless (*)     Leukocytes, UA 2+ (*)     All other components within normal limits   LIPASE   TROPONIN I   TSH   URINALYSIS MICROSCOPIC   URINALYSIS, REFLEX TO URINE CULTURE          Imaging Results    None          Medications   magnesium sulfate 2g in water 50mL IVPB (premix) (2 g Intravenous New Bag 4/14/24 0021)   traZODone tablet 200 mg (200 mg Oral Given 4/14/24 0150)   lactated ringers bolus 1,000 mL (0 mLs Intravenous Stopped 4/13/24 2322)   ondansetron injection 4 mg (4 mg Intravenous Given 4/13/24 2222)   prochlorperazine injection Soln 10 mg (10 mg Intravenous Given 4/14/24 0020)   diazePAM tablet 2 mg (2 mg Oral Given 4/14/24 0150)     Medical Decision Making  63-year-old woman with EtOH use complicated by alcohol withdrawal seizures, CLL, COPD, depression with history of suicide attempt, bilateral mastectomy, hypothyroidism, DM 2, presenting with EtOH intoxication, reporting abdominal pain, nausea/vomiting, reports she drank a pt of vodka at 1:00 p.m after she was discharged from the hospital  today.    Differential diagnosis includes but is not limited to:  EtOH intoxication/withdrawal, UTI, metabolic derangements    Benign abdominal exam, with no signs of trauma.  Patient does not know if she is currently taking Eliquis, however no headache, no focal neuro deficits on exam, denies any recent falls, and no physical exam findings of trauma, no further CT head or CT abdominal imaging indicated at this time.  PEC initially placed on patient due to A&O x1 status, with history of attempting to leave AMA while clinically intoxicated.  On reassessment, patient is A&O x3, denying any complaints, asking for food, denies SI/HI/AVH.  ETOH level at this time found to be 112.  Labs including CMP, lipase, magnesium pending.  Given 1 L IV fluids, we will monitor for signs of withdrawal, currently heart rate in the 60s, with no tongue fasciculations or generalized tremors on exam.  Anticipate discharge if patient is able to ambulate, clinically sober and continuing to deny SI/HI/AVH, with removal of PEC at that time.    Patient was able to ambulate on reassessment, is clinically sober, magnesium repleted in the ED, with mild AST/ALT elevation consistent with alcohol use, given Compazine for a mild headache with no vision changes, no neck pain, no focal neuro deficits.  Patient tolerating p.o. in the ED, with no nausea/vomiting. Denies SI/HI/AVH.  Pec canceled.  Continues with no signs of alcohol withdrawal on physical exam or vitals.  Pending UA, and discharge with SPD, as patient has no keys to her house, will continue to monitor with for withdrawal, remainder of care signed out to incoming ED team.    Amount and/or Complexity of Data Reviewed  Labs: ordered.    Risk  Prescription drug management.               ED Course as of 04/14/24 0217   Sat Apr 13, 2024   2250 Patient reassessed, denies nausea/vomiting/abdominal pain, denies SI/HI/AVH, is A&O x3 [LF]   2325 EKG independently interpreted by me with normal sinus  rhythm rate of 72, , no acute signs of ischemia, no STEMI [LF]   Sun Apr 14, 2024   0017 Thrombocytopenia patient's baseline, most recent labs with platelet count of 86, most likely bone marrow suppression from EtOH use [LF]   0038 Patient reassessed, was ambulating with no ataxia, clinically sober, continues to deny SI/HI/AVH, PEC canceled [LF]      ED Course User Index  [LF] Gi Juarez MD                           Clinical Impression:  Final diagnoses:  [R10.9] Abdominal pain  [E83.42] Hypomagnesemia (Primary)          ED Disposition Condition    Discharge Stable          ED Prescriptions    None       Follow-up Information    None          Gi Juarez MD  04/14/24 8223

## 2024-04-14 NOTE — ED NOTES
Pt belongings bag   Blue shirt   Black pants  Gold shoes   Gold bag(green vape pin)   Cell phone in locker

## 2024-04-14 NOTE — ED NOTES
Patient identifiers for Earl Abdul checked and correct.    LOC: The patient is somnolent and aware of environment with an appropriate affect, the patient is oriented x 4 and speaking appropriately.  APPEARANCE: Patient resting comfortably and in no acute distress, patient is clean and well groomed, patient's clothing is properly fastened.  SKIN: The skin is warm and dry, color consistent with ethnicity, patient has normal skin turgor and moist mucus membranes, skin intact, no breakdown or bruising noted.  MUSCULOSKELETAL: Patient moving all extremities well, no obvious swelling or deformities noted.  RESPIRATORY: Airway is open and patent, respirations are spontaneous and even, patient has a normal effort and rate.  CARDIAC: Patient has a normal rate and rhythm, no periphreal edema noted, capillary refill < 3 seconds. Normal +2 pedal pulses present.  ABDOMEN: Soft and non tender to palpation, no distention noted. Patient denies any nausea, vomiting, diarrhea, or constipation.   NEUROLOGIC: Eyes open spontaneously, PERRL, behavior appropriate to situation, follows commands, facial expression symmetrical, bilateral hand grasp equal and even, purposeful motor response noted, normal sensation in all extremities.     Allergies reported:   Review of patient's allergies indicates:   Allergen Reactions    Lortab [hydrocodone-acetaminophen] Itching    Promethazine Itching and Other (See Comments)

## 2024-04-17 PROBLEM — I70.0 AORTIC ATHEROSCLEROSIS: Status: ACTIVE | Noted: 2024-04-17

## 2024-04-23 NOTE — DISCHARGE SUMMARY
[facility] - Stepdown Flex ([place] [id number])  [facility] Medicine  Discharge Summary      Patient Name: Earl Abdul  MRN: [id number]  IZA: [id number]  Patient Class: IP- Inpatient  Admission Date: 4/11/2024  Hospital Length of Stay: 2 days  Discharge Date and Time: 4/13/2024  6:04 PM  Attending Physician: Shelley att. providers found   Discharging Provider: Raman Urena MD  Primary Care Provider: Andrew Rodriguez MD  [facility] Medicine Team: [facility] [place] [facility] MED Q Raman Urena MD  Primary Care Team: [facility] [place] [facility] MED Q    HPI:   Earl Abdul is a 63 y.o. female w/ a PMHx of EtOH use disorder, CLL not currently on any treatment, COPD, HTN, depression on multiple antidepressants, right breast cancer s/p bilateral mastectomy, hypothyroidism. Patient presents to the ED today for generalized fatigue and nausea.  Patient states that these are both chronic issues, but her nauseous becoming unbearable.    left her 6 weeks ago. Recent MICU admit.    Patient was seen recently for similar complaints a  She was admitted for similar complaints of alcohol withdrawal and atraumatic rhabdomyolysis from 2/10-2/14/24.  In the ED, the pt had signs of active EtOH withdrawal. Last drink was night prior to admission. Per history she drinks a couple glasses per day of vodka, goes through a few bottles a week. Pt was nauseated, but was not vomiting. Pt was treated with benzodiazepines.    PEC in place.    * No surgery found *      Hospital Course:   Earl Abdul is a 63 y.o. female w/ a PMHx of EtOH use disorder, CLL not currently on any treatment, COPD, HTN, depression on multiple antidepressants, right breast cancer s/p bilateral mastectomy, hypothyroidism. Patient presents to the ED today for generalized fatigue and nausea.  Patient states that these are both chronic issues, but her nauseous becoming unbearable.    left her 6 weeks ago. Recent MICU admit. AG is 24     Patient was seen recently for similar complaints.  She  was admitted for similar complaints of alcohol withdrawal and atraumatic rhabdomyolysis from 2/10-2/14/24.  In the ED, the pt had signs of active EtOH withdrawal. Last drink was night prior to admission. Per history she drinks a couple glasses per day of vodka, goes through a few bottles a week. Pt was nauseated, but was not vomiting. Pt was treated with benzodiazepines.     PEC in place, wanted to leave AMA without capacity    Seen by psychiatry, PEC lifted, plan is for voluntary admission to rehab unit in [place]     Goals of Care Treatment Preferences:  Code Status: Full Code    Health care agent: Spouse is RAFK  Health care agent number: No value filed.          What is most important right now is to focus on curative/life-prolongation (regardless of treatment burdens).  Accordingly, we have decided that the best plan to meet the patient's goals includes continuing with treatment.      Consults:   Consults (From admission, onward)          Status Ordering Provider     Inpatient consult to Midline team  Once        Provider:  (Not yet assigned)    Completed ADELINA KIMBALL     Inpatient consult to Registered Dietitian/Nutritionist  Once        Provider:  (Not yet assigned)    Completed ADELINA KIMBALL     Inpatient consult to Psychiatry  Once        Provider:  (Not yet assigned)    Completed ADELINA KIMBALL            Renal/  Starvation ketoacidosis  AG 24  Will trend BMP  IVF for now        Final Active Diagnoses:    Diagnosis Date Noted POA    PRINCIPAL PROBLEM:  Alcohol withdrawal [F10.939] 02/07/2014 Yes    Moderate malnutrition [E44.0] 04/12/2024 Yes    Atrial fibrillation with RVR [I48.91] 04/11/2024 Yes    Subclinical hyperthyroidism [E05.90] 03/02/2024 Yes    CLL (chronic lymphocytic leukemia) [C91.10] 09/30/2022 Yes     Chronic    Starvation ketoacidosis [T73.0XXA, E87.29] 04/29/2022 Yes    Type 2 diabetes mellitus without complication, without long-term current use of insulin [E11.9] 11/30/2021 Yes  "   COPD (chronic obstructive pulmonary disease) [J44.9] 04/17/2021 Yes    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    Significant Diagnostic Studies: Labs: BMP: No results for input(s): "GLU", "NA", "K", "CL", "CO2", "BUN", "CREATININE", "CALCIUM", "MG" in the last 48 hours.    Pending Diagnostic Studies:       None           Medications:  Reconciled Home Medications:      Medication List        START taking these medications      diazePAM 5 MG tablet  Commonly known as: VALIUM  Take 1 tablet (5 mg total) by mouth every 6 (six) hours as needed for Anxiety.            CONTINUE taking these medications      ACCU-CHEK SOFTCLIX LANCETS Misc  Generic drug: lancets  Use to check blood glucose 4 times daily     ELIQUIS 5 mg Tab  Generic drug: apixaban  Take 1 tablet (5 mg total) by mouth 2 (two) times daily.     EScitalopram oxalate 20 MG tablet  Commonly known as: LEXAPRO  Take 1 tablet (20 mg total) by mouth once daily.     folic acid 1 MG tablet  Commonly known as: FOLVITE  Take 1 tablet (1 mg total) by mouth once daily.     gabapentin 300 MG capsule  Commonly known as: NEURONTIN  Take 1 capsule (300 mg total) by mouth 3 (three) times daily.     levothyroxine 75 MCG tablet  Commonly known as: SYNTHROID  Take 1 tablet (75 mcg total) by mouth before breakfast.     metFORMIN 500 MG ER 24hr tablet  Commonly known as: GLUCOPHAGE-XR  Take 500 mg by mouth 2 (two) times daily with meals.     metoprolol succinate 50 MG 24 hr tablet  Commonly known as: TOPROL-XL  Take 1 tablet (50 mg total) by mouth once daily.     multivitamin Tab  Take 1 tablet by mouth once daily.     naltrexone 50 mg tablet  Commonly known as: " DEPADE  Take 50 mg by mouth once daily.     nicotine 21 mg/24 hr  Commonly known as: NICODERM CQ  Place 1 patch onto the skin once daily.     omeprazole 20 MG capsule  Commonly known as: PRILOSEC  Take 1 capsule (20 mg total) by mouth once daily.     sucralfate 1 gram tablet  Commonly known as: CARAFATE  Take 1 tablet (1 g total) by mouth 4 (four) times daily.     traZODone 100 MG tablet  Commonly known as: DESYREL  Take 2 tablets (200 mg total) by mouth every evening.              Indwelling Lines/Drains at time of discharge:   Lines/Drains/Airways       None                   Time spent on the discharge of patient: 15 minutes         Raman Urena MD  Department of Hospital Medicine  [facility] - Stepdown Flex ([place]-[facility])

## 2024-04-23 NOTE — HOSPITAL COURSE
Earl Abdul is a 63 y.o. female w/ a PMHx of EtOH use disorder, CLL not currently on any treatment, COPD, HTN, depression on multiple antidepressants, right breast cancer s/p bilateral mastectomy, hypothyroidism. Patient presents to the ED today for generalized fatigue and nausea.  Patient states that these are both chronic issues, but her nauseous becoming unbearable.    left her 6 weeks ago. Recent MICU admit. AG is 24     Patient was seen recently for similar complaints.  She was admitted for similar complaints of alcohol withdrawal and atraumatic rhabdomyolysis from 2/10-2/14/24.  In the ED, the pt had signs of active EtOH withdrawal. Last drink was night prior to admission. Per history she drinks a couple glasses per day of vodka, goes through a few bottles a week. Pt was nauseated, but was not vomiting. Pt was treated with benzodiazepines.     PEC in place, wanted to leave AMA without capacity    Seen by psychiatry, PEC lifted, plan is for voluntary admission to rehab unit in [place]

## 2024-04-24 ENCOUNTER — TELEPHONE (OUTPATIENT)
Dept: CARDIOLOGY | Facility: CLINIC | Age: 64
End: 2024-04-24
Payer: COMMERCIAL

## 2024-04-24 DIAGNOSIS — I48.91 ATRIAL FIBRILLATION, UNSPECIFIED TYPE: Primary | ICD-10-CM

## 2024-05-07 NOTE — ED TRIAGE NOTES
oNSET MIGRAINE H/A 2 WKS AGO- HAVING N/V / PHOTIPHOBIA. C/o NECK PAIN    RESULT NOTE    Results reviewed by ordering physician.  Called patient to personally discuss results. Spoke with patient IN DEPTH regarding ultrasound results. She is aware that the ultrasound is only suggestive of a cyst and does not give complete confirmation. Patient does request removal because it is bothersome to her and she would like to have it sent to pathology.       Instructions for Clinical Derm Team:   (remember to route Result Note to appropriate staff):    Call patient and schedule for EXCISION WITH DR. DOYLE.     Result & Plan:    US head neck soft tissue  Order: [id number]   Status: Final result      Visible to patient: Yes (seen)      Dx: Lump of skin    3 Result Notes      1 Follow-up Encounter  Details      Reading Physician Reading Date Result Priority  Derick Smith MD  [phone] 4/30/2024     Narrative & Impression  HEAD NECK SOFT TISSUE ULTRASOUND    INDICATION: R22.9: Localized swelling, mass and lump, unspecified.  Left posterior auricular palpable abnormality. Described as tender, irritated    COMPARISON: None    TECHNIQUE: Real-time ultrasound of the area of concern was performed with a linear transducer with both volumetric sweeps and still imaging techniques.    FINDINGS:    Corresponding to the palpable abnormality, there is a hypoechoic subcutaneous nodule measuring 0.8 x 0.8 x 0.3 cm, approximately 0.1 cm below the skin surface. Although very subtle, there is suggestion of a thin linear connection to the skin surface (see   for example sweep series 300, images 59 through 63)    IMPRESSION:    Palpable abnormality corresponds to a 0.8 cm subcutaneous hypoechoic nodule. Probable thin linear connection to the skin surface. Overall appearance most suggestive of an epidermoid inclusion cyst.  Periodic physical examination reassessment recommended to assure there is no interval change in size or character that would warrant further evaluation      Workstation performed:  [id number]    Specimen Collected: 04/30/24  8:28 PM Last Resulted: 04/30/24  8:32 PM

## 2024-05-15 ENCOUNTER — PATIENT MESSAGE (OUTPATIENT)
Dept: INTERNAL MEDICINE | Facility: CLINIC | Age: 64
End: 2024-05-15
Payer: COMMERCIAL

## 2024-05-17 ENCOUNTER — LAB VISIT (OUTPATIENT)
Dept: LAB | Facility: OTHER | Age: 64
End: 2024-05-17
Attending: INTERNAL MEDICINE
Payer: COMMERCIAL

## 2024-05-17 ENCOUNTER — OFFICE VISIT (OUTPATIENT)
Dept: INTERNAL MEDICINE | Facility: CLINIC | Age: 64
End: 2024-05-17
Attending: INTERNAL MEDICINE
Payer: COMMERCIAL

## 2024-05-17 VITALS
BODY MASS INDEX: 31.64 KG/M2 | WEIGHT: 196.88 LBS | DIASTOLIC BLOOD PRESSURE: 90 MMHG | SYSTOLIC BLOOD PRESSURE: 140 MMHG | HEIGHT: 66 IN | OXYGEN SATURATION: 95 % | HEART RATE: 86 BPM

## 2024-05-17 DIAGNOSIS — R53.1 WEAKNESS: ICD-10-CM

## 2024-05-17 DIAGNOSIS — I48.91 ATRIAL FIBRILLATION WITH RVR: ICD-10-CM

## 2024-05-17 DIAGNOSIS — F10.20 ALCOHOL USE DISORDER, SEVERE, DEPENDENCE: ICD-10-CM

## 2024-05-17 DIAGNOSIS — R53.1 WEAKNESS: Primary | ICD-10-CM

## 2024-05-17 DIAGNOSIS — F32.A DEPRESSION, UNSPECIFIED DEPRESSION TYPE: ICD-10-CM

## 2024-05-17 DIAGNOSIS — J44.9 CHRONIC OBSTRUCTIVE PULMONARY DISEASE, UNSPECIFIED COPD TYPE: ICD-10-CM

## 2024-05-17 DIAGNOSIS — I48.0 PAROXYSMAL ATRIAL FIBRILLATION: ICD-10-CM

## 2024-05-17 PROBLEM — E87.20 LACTIC ACIDOSIS: Status: RESOLVED | Noted: 2023-09-17 | Resolved: 2024-05-17

## 2024-05-17 LAB
ALBUMIN SERPL BCP-MCNC: 4.1 G/DL (ref 3.5–5.2)
ALP SERPL-CCNC: 61 U/L (ref 55–135)
ALT SERPL W/O P-5'-P-CCNC: 38 U/L (ref 10–44)
ANION GAP SERPL CALC-SCNC: 8 MMOL/L (ref 8–16)
AST SERPL-CCNC: 26 U/L (ref 10–40)
BACTERIA #/AREA URNS AUTO: NORMAL /HPF
BASOPHILS NFR BLD: 0 % (ref 0–1.9)
BILIRUB SERPL-MCNC: 0.4 MG/DL (ref 0.1–1)
BILIRUB UR QL STRIP: NEGATIVE
BUN SERPL-MCNC: 14 MG/DL (ref 8–23)
CALCIUM SERPL-MCNC: 9.2 MG/DL (ref 8.7–10.5)
CHLORIDE SERPL-SCNC: 107 MMOL/L (ref 95–110)
CK SERPL-CCNC: 75 U/L (ref 20–180)
CLARITY UR REFRACT.AUTO: CLEAR
CO2 SERPL-SCNC: 25 MMOL/L (ref 23–29)
COLOR UR AUTO: YELLOW
CREAT SERPL-MCNC: 0.9 MG/DL (ref 0.5–1.4)
CRP SERPL-MCNC: 4.2 MG/L (ref 0–8.2)
DIFFERENTIAL METHOD BLD: ABNORMAL
EOSINOPHIL NFR BLD: 0 % (ref 0–8)
ERYTHROCYTE [DISTWIDTH] IN BLOOD BY AUTOMATED COUNT: 18.1 % (ref 11.5–14.5)
EST. GFR  (NO RACE VARIABLE): >60 ML/MIN/1.73 M^2
FOLATE SERPL-MCNC: 15.9 NG/ML (ref 4–24)
GLUCOSE SERPL-MCNC: 179 MG/DL (ref 70–110)
GLUCOSE UR QL STRIP: ABNORMAL
HCT VFR BLD AUTO: 36.1 % (ref 37–48.5)
HGB BLD-MCNC: 10.6 G/DL (ref 12–16)
HGB UR QL STRIP: NEGATIVE
IMM GRANULOCYTES # BLD AUTO: ABNORMAL K/UL (ref 0–0.04)
IMM GRANULOCYTES NFR BLD AUTO: ABNORMAL % (ref 0–0.5)
KETONES UR QL STRIP: NEGATIVE
LEUKOCYTE ESTERASE UR QL STRIP: NEGATIVE
LYMPHOCYTES NFR BLD: 70 % (ref 18–48)
MCH RBC QN AUTO: 25.2 PG (ref 27–31)
MCHC RBC AUTO-ENTMCNC: 29.4 G/DL (ref 32–36)
MCV RBC AUTO: 86 FL (ref 82–98)
MICROSCOPIC COMMENT: NORMAL
MONOCYTES NFR BLD: 4 % (ref 4–15)
NEUTROPHILS NFR BLD: 25 % (ref 38–73)
NEUTS BAND NFR BLD MANUAL: 1 %
NITRITE UR QL STRIP: NEGATIVE
NRBC BLD-RTO: 0 /100 WBC
PH UR STRIP: 6 [PH] (ref 5–8)
PLATELET # BLD AUTO: 173 K/UL (ref 150–450)
PLATELET BLD QL SMEAR: ABNORMAL
PMV BLD AUTO: 10.9 FL (ref 9.2–12.9)
POTASSIUM SERPL-SCNC: 4 MMOL/L (ref 3.5–5.1)
PROT SERPL-MCNC: 7.2 G/DL (ref 6–8.4)
PROT UR QL STRIP: NEGATIVE
RBC # BLD AUTO: 4.21 M/UL (ref 4–5.4)
RBC #/AREA URNS AUTO: 1 /HPF (ref 0–4)
SMUDGE CELLS BLD QL SMEAR: PRESENT
SODIUM SERPL-SCNC: 140 MMOL/L (ref 136–145)
SP GR UR STRIP: 1.02 (ref 1–1.03)
SQUAMOUS #/AREA URNS AUTO: 0 /HPF
TSH SERPL DL<=0.005 MIU/L-ACNC: 0.94 UIU/ML (ref 0.4–4)
URN SPEC COLLECT METH UR: ABNORMAL
VIT B12 SERPL-MCNC: 372 PG/ML (ref 210–950)
WBC # BLD AUTO: 25.39 K/UL (ref 3.9–12.7)
WBC #/AREA URNS AUTO: 1 /HPF (ref 0–5)
YEAST UR QL AUTO: NORMAL

## 2024-05-17 PROCEDURE — 3044F HG A1C LEVEL LT 7.0%: CPT | Mod: CPTII,S$GLB,, | Performed by: INTERNAL MEDICINE

## 2024-05-17 PROCEDURE — 36415 COLL VENOUS BLD VENIPUNCTURE: CPT | Performed by: INTERNAL MEDICINE

## 2024-05-17 PROCEDURE — 82746 ASSAY OF FOLIC ACID SERUM: CPT | Performed by: INTERNAL MEDICINE

## 2024-05-17 PROCEDURE — 1160F RVW MEDS BY RX/DR IN RCRD: CPT | Mod: CPTII,S$GLB,, | Performed by: INTERNAL MEDICINE

## 2024-05-17 PROCEDURE — 99999 PR PBB SHADOW E&M-EST. PATIENT-LVL IV: CPT | Mod: PBBFAC,,, | Performed by: INTERNAL MEDICINE

## 2024-05-17 PROCEDURE — 80053 COMPREHEN METABOLIC PANEL: CPT | Performed by: INTERNAL MEDICINE

## 2024-05-17 PROCEDURE — 82607 VITAMIN B-12: CPT | Performed by: INTERNAL MEDICINE

## 2024-05-17 PROCEDURE — G2211 COMPLEX E/M VISIT ADD ON: HCPCS | Mod: S$GLB,,, | Performed by: INTERNAL MEDICINE

## 2024-05-17 PROCEDURE — 82550 ASSAY OF CK (CPK): CPT | Performed by: INTERNAL MEDICINE

## 2024-05-17 PROCEDURE — 3080F DIAST BP >= 90 MM HG: CPT | Mod: CPTII,S$GLB,, | Performed by: INTERNAL MEDICINE

## 2024-05-17 PROCEDURE — 3008F BODY MASS INDEX DOCD: CPT | Mod: CPTII,S$GLB,, | Performed by: INTERNAL MEDICINE

## 2024-05-17 PROCEDURE — 85007 BL SMEAR W/DIFF WBC COUNT: CPT | Performed by: INTERNAL MEDICINE

## 2024-05-17 PROCEDURE — 3288F FALL RISK ASSESSMENT DOCD: CPT | Mod: CPTII,S$GLB,, | Performed by: INTERNAL MEDICINE

## 2024-05-17 PROCEDURE — 4010F ACE/ARB THERAPY RXD/TAKEN: CPT | Mod: CPTII,S$GLB,, | Performed by: INTERNAL MEDICINE

## 2024-05-17 PROCEDURE — 99215 OFFICE O/P EST HI 40 MIN: CPT | Mod: S$GLB,,, | Performed by: INTERNAL MEDICINE

## 2024-05-17 PROCEDURE — 1101F PT FALLS ASSESS-DOCD LE1/YR: CPT | Mod: CPTII,S$GLB,, | Performed by: INTERNAL MEDICINE

## 2024-05-17 PROCEDURE — 84443 ASSAY THYROID STIM HORMONE: CPT | Performed by: INTERNAL MEDICINE

## 2024-05-17 PROCEDURE — 86140 C-REACTIVE PROTEIN: CPT | Performed by: INTERNAL MEDICINE

## 2024-05-17 PROCEDURE — 84425 ASSAY OF VITAMIN B-1: CPT | Performed by: INTERNAL MEDICINE

## 2024-05-17 PROCEDURE — 3077F SYST BP >= 140 MM HG: CPT | Mod: CPTII,S$GLB,, | Performed by: INTERNAL MEDICINE

## 2024-05-17 PROCEDURE — 1159F MED LIST DOCD IN RCRD: CPT | Mod: CPTII,S$GLB,, | Performed by: INTERNAL MEDICINE

## 2024-05-17 PROCEDURE — 85027 COMPLETE CBC AUTOMATED: CPT | Performed by: INTERNAL MEDICINE

## 2024-05-17 PROCEDURE — 81001 URINALYSIS AUTO W/SCOPE: CPT | Performed by: INTERNAL MEDICINE

## 2024-05-17 RX ORDER — ONDANSETRON 4 MG/1
4 TABLET, FILM COATED ORAL DAILY PRN
COMMUNITY

## 2024-05-17 RX ORDER — PREDNISONE 20 MG/1
20 TABLET ORAL DAILY
COMMUNITY
Start: 2024-05-10

## 2024-05-17 RX ORDER — OLANZAPINE 7.5 MG/1
7.5 TABLET ORAL NIGHTLY
Status: ON HOLD | COMMUNITY
Start: 2024-05-14 | End: 2024-06-09

## 2024-05-17 RX ORDER — IBUPROFEN 400 MG/1
400 TABLET ORAL 3 TIMES DAILY
COMMUNITY
Start: 2023-12-13

## 2024-05-17 RX ORDER — LOSARTAN POTASSIUM 25 MG/1
25 TABLET ORAL DAILY
COMMUNITY
Start: 2024-05-14

## 2024-05-17 RX ORDER — BUPROPION HYDROCHLORIDE 200 MG/1
200 TABLET, EXTENDED RELEASE ORAL DAILY
Status: ON HOLD | COMMUNITY
Start: 2024-05-14 | End: 2024-06-09

## 2024-05-17 NOTE — PROGRESS NOTES
Subjective:       Patient ID: Earl Abdul is a 63 y.o. female.    Chief Complaint: Dizziness (3 DAYS), Headache (3 DAYS), Fatigue (3 DAYS), Insomnia (3 DAYS), and Nausea    Here for urgent visit      CLARKE, weak, nauseated, shaking for past 3 days. Similar presentation in past. Reports being 40 day etoh free after 30 day stay in [place] inpatient detox. She was sent to ED during that stay and discharged on 10- day prednisone taper. She is not sure why. This was stopped 48 hours prior. She is hungry but nauseated. Seen in ED prior to her inpatient stay. Details of 4/13/34 ED visit below. Pt is scheduled for IOP starting in 3 days.      4/13/24 ED Visit: 63-year-old woman with EtOH use complicated by alcohol withdrawal seizures, CLL, COPD, depression with history of suicide attempt, bilateral mastectomy, hypothyroidism, DM 2, presenting with EtOH intoxication, reporting abdominal pain, nausea/vomiting, reports she drank a pt of vodka at 1:00 p.m after she was discharged from the hospital today.Benign abdominal exam, with no signs of trauma.  Patient does not know if she is currently taking Eliquis, however no headache, no focal neuro deficits on exam, denies any recent falls, and no physical exam findings of trauma, no further CT head or CT abdominal imaging indicated at this time.  PEC initially placed on patient due to A&O x1 status, with history of attempting to leave AMA while clinically intoxicated.  On reassessment, patient is A&O x3, denying any complaints, asking for food, denies SI/HI/AVH.  ETOH level at this time found to be 112.  Labs including CMP, lipase, magnesium pending.  Given 1 L IV fluids, we will monitor for signs of withdrawal, currently heart rate in the 60s, with no tongue fasciculations or generalized tremors on exam.  Anticipate discharge if patient is able to ambulate, clinically sober and continuing to deny SI/HI/AVH, with removal of PEC at that time    Admission 3/1/24-3/6/24 Hospital  Course:  Pt to presented to the ED on 3/1/2024 with complaint of nausea, confusion, & body aches. In the ED, the pt had signs of active EtOH withdrawal. Last drink was night prior to admission. Per history she drinks a couple glasses per day of vodka, goes through a few bottles a week. Pt was nauseated, but was not vomiting. Pt was treated with benzodiazepines & responded favorably. Pt had some atrial fib with RVR which responded to metoprolol IV. Now in NSR. Continued on valium taper and has remained stable with improvement of withdrawal symptoms. She will be discharged home with plan to go to drug rehab in [facility] tomorrow. Follow up with cardiology and addiction psych.     Prior ED Course :She presented to the ED tonight with complaint of nausea, vomiting, generalized abdominal pain, SOB which started yesterday morning. Now her primary complaint is restless legs. She reports daily alcohol use, last drink was yesterday. She also endorses a history of alcohol withdrawals, and reports her symptoms now are similar to previous withdrawal symptoms. Her last BM was yesterday, she denies fever, chills, chest pain, urinary symptoms, cough, congestion, runny nose, HA, seizures. She denies current therapy for her CLL. In the ED, she was noted to have confusion, hand tremor, tachycardia. Workup notable for hypoglycemia which improved with drinking juice. She also had a leukocytosis of 27.5, mild anemia. Lipase normal. CT chest/abd/pelvis with no acute abnormalities identified but it did demonstrate nolvia hepatis and external iliac chain lymphadenopathy in keeping with known CLL as well as hepatic steatosis.   was given 1mg ativan, a total of 30mg Valium, 0.625 of droperidol, 5mg Haldol in the ED without resolution of withdrawal symptoms so was then given 260mg phenobarbital.   MICU consulted for further treatment of withdrawals and careful monitoring due to high doses of medications with risk of respiratory depression.  "          Review of Systems   Constitutional:  Positive for activity change, appetite change, chills and fatigue. Negative for fever and unexpected weight change.   HENT:  Negative for congestion, ear pain, hearing loss, postnasal drip, tinnitus, trouble swallowing and voice change.    Eyes:  Positive for visual disturbance. Negative for photophobia, pain, discharge, redness and itching.   Respiratory:  Negative for cough, chest tightness, shortness of breath and wheezing.    Cardiovascular:  Negative for chest pain, palpitations and leg swelling.   Gastrointestinal:  Positive for nausea. Negative for abdominal distention, abdominal pain, anal bleeding, blood in stool, constipation, diarrhea, rectal pain and vomiting.   Endocrine: Negative for cold intolerance, heat intolerance, polydipsia, polyphagia and polyuria.   Genitourinary:  Negative for difficulty urinating, dysuria, flank pain, frequency, genital sores, hematuria, urgency, vaginal bleeding and vaginal discharge.   Musculoskeletal:  Positive for gait problem and myalgias. Negative for arthralgias, joint swelling, neck pain and neck stiffness.   Skin:  Negative for rash.   Allergic/Immunologic: Negative for food allergies.   Neurological:  Positive for weakness, light-headedness and headaches. Negative for dizziness, tremors, seizures, syncope, facial asymmetry and numbness.   Hematological:  Does not bruise/bleed easily.   Psychiatric/Behavioral:  Positive for confusion, decreased concentration, dysphoric mood and sleep disturbance. Negative for agitation and suicidal ideas. The patient is not nervous/anxious.        Objective:      Vitals:    05/17/24 1011   BP: (!) 140/90   BP Location: Right arm   Patient Position: Sitting   BP Method: Medium (Manual)   Pulse: 86   SpO2: 95%   Weight: 89.3 kg (196 lb 13.9 oz)   Height: 5' 6" (1.676 m)      Physical Exam  Vitals and nursing note reviewed.   Constitutional:       General: She is not in acute distress.     " Appearance: Normal appearance. She is well-developed.   HENT:      Head: Normocephalic and atraumatic.      Mouth/Throat:      Pharynx: No oropharyngeal exudate.   Eyes:      General: No scleral icterus.     Conjunctiva/sclera: Conjunctivae normal.      Pupils: Pupils are equal, round, and reactive to light.   Neck:      Thyroid: No thyromegaly.   Cardiovascular:      Rate and Rhythm: Normal rate and regular rhythm.      Heart sounds: Normal heart sounds. No murmur heard.  Pulmonary:      Effort: Pulmonary effort is normal.      Breath sounds: Normal breath sounds. No wheezing or rales.   Abdominal:      General: There is no distension.   Musculoskeletal:         General: No tenderness.   Lymphadenopathy:      Cervical: No cervical adenopathy.   Skin:     General: Skin is warm and dry.   Neurological:      Mental Status: She is alert and oriented to person, place, and time.   Psychiatric:         Attention and Perception: She does not perceive auditory or visual hallucinations.         Mood and Affect: Mood is depressed. Affect is not inappropriate.         Speech: She is communicative. Speech is not rapid and pressured, delayed, slurred or tangential.         Behavior: Behavior is slowed (mild). Behavior is not agitated, aggressive, hyperactive or combative.         Thought Content: Thought content is not paranoid or delusional. Thought content does not include homicidal or suicidal ideation.         Cognition and Memory: Memory is impaired. She exhibits impaired remote memory.         Assessment:       1. Weakness    2. Alcohol use disorder, severe, dependence    3. Depression, unspecified depression type    4. Chronic obstructive pulmonary disease, unspecified COPD type    5. Atrial fibrillation with RVR    6. Paroxysmal atrial fibrillation        Plan:       Earl was seen today for dizziness, headache, fatigue, insomnia and nausea.    Diagnoses and all orders for this visit:    Weakness   Suspect steroid  withdrawal contributing to her exacerbation of her chronic symptomatology but due to risk factors and reliability of historian workup below. Overall exam reassuring.   -     Comprehensive Metabolic Panel; Future  -     CBC Auto Differential; Future  -     TSH; Future  -     Urinalysis, Reflex to Urine Culture Urine, Clean Catch; Future  -     CK; Future  -     SCHEDULED EKG 12-LEAD (to Muse); Future  -     VITAMIN B1; Future  -     Vitamin B12; Future  -     Folate; Future  -     C-REACTIVE PROTEIN; Future    Alcohol use disorder, severe, dependence   IOP Monday. Pt motivation is best i have seen in a couple years. Office and Emergency Department prompts discussed.      Depression, unspecified depression type   Needs tx  Chronic obstructive pulmonary disease, unspecified COPD type   Lungs clear. No tachypnea.   Atrial fibrillation with RVR   NSR  Paroxysmal atrial fibrillation    >40 minutes were spent today reviewing patient chart.  Much of today's visit was spent discussing with patient my chart review, their concerns, health maintenance, my assessment, and recommendations.       Visit today is associated with current or anticipated ongoing medical care related to this patient's single serious condition/complex condition of severe alcohol dependence, paroxysmal atrial fibrialltiaon, COPD, chronic gastritis. The patient will return to see me as these issues will be followed longitudinally.      Andrew Chase MD  Internal Medicine-[facility]        Side effects of medication(s) were discussed in detail and patient voiced understanding.  Patient will call back for any issues or complications.

## 2024-05-20 ENCOUNTER — HOSPITAL ENCOUNTER (OUTPATIENT)
Dept: PSYCHIATRY | Facility: HOSPITAL | Age: 64
Discharge: HOME | End: 2024-05-20
Attending: PSYCHIATRY & NEUROLOGY
Payer: COMMERCIAL

## 2024-05-20 DIAGNOSIS — F10.20 ALCOHOL USE DISORDER, SEVERE, DEPENDENCE: Primary | ICD-10-CM

## 2024-05-20 DIAGNOSIS — M79.7 FIBROMYALGIA: ICD-10-CM

## 2024-05-20 DIAGNOSIS — F41.8 DEPRESSION WITH ANXIETY: ICD-10-CM

## 2024-05-20 DIAGNOSIS — F10.151 ALCOHOL ABUSE WITH ALCOHOL-INDUCED PSYCHOTIC DISORDER WITH HALLUCINATIONS: ICD-10-CM

## 2024-05-20 PROCEDURE — 99232 SBSQ HOSP IP/OBS MODERATE 35: CPT | Mod: ,,, | Performed by: PSYCHIATRY & NEUROLOGY

## 2024-05-20 PROCEDURE — 80307 DRUG TEST PRSMV CHEM ANLYZR: CPT | Performed by: PSYCHIATRY & NEUROLOGY

## 2024-05-20 PROCEDURE — 80321 ALCOHOLS BIOMARKERS 1OR 2: CPT | Performed by: PSYCHIATRY & NEUROLOGY

## 2024-05-20 PROCEDURE — 90853 GROUP PSYCHOTHERAPY: CPT | Performed by: SOCIAL WORKER

## 2024-05-20 NOTE — PROGRESS NOTES
Group Psychotherapy (PhD/LCSW)    Site: [facility]    Clinical status of patient: Intensive Outpatient Program (IOP)    Date: 5/20/2024    Group Focus: Sleep 101    Length of service: [id number] - 45-50 minutes    Number of patients in attendance: 12    Referred by: [facility]     Target symptoms: Alcohol Abuse    Patient's response to treatment: Active Listening and Feedback given to other patients.     Progress toward goals: Progressing adequately    Interval History: Session focus was on the use and implementation of relaxation techniques to help improve sleep quality and counteract negative sleep thoughts. Patients explored the connection between arousal and sleep. Patients engaged in relaxation techniques to promote relaxation including diaphragmatic breathing, paced breathing, visualization, body scan mediations and progressive muscle relaxation. Patients reviewed their sleep diaries, made adjustments to sleep compression times, and attended to stimulus control.     Diagnosis:     ICD-10-CM ICD-9-CM   1. Alcohol use disorder, severe, dependence  F10.20 303.90   2. Depression with anxiety  F41.8 300.4   3. Fibromyalgia  M79.7 729.1     Plan: Continue treatment on ORP

## 2024-05-20 NOTE — H&P
INITIAL VISIT: PSYCHIATRY  [facility]        ASSESSMENT AND PLAN:      DIAGNOSES & PROBLEMS:  Alcohol use disorder, severe  Major depressive disorder, recurrent moderate   Generalized anxiety disorder   Unspecified neurocognitive disorder     In Summary:  Pt is a 64yo female with past medical history of COPD, HTN, HLD, subclinical hypothyroidism, chronic lymphocytic leukemia, T2DM, Afib with RVR, fibromyalgia, and fatty liver, who presents to [facility] for continued treatment of alcohol use disorder.  Pt presents to the [facility] at the insistence of her  for continued treatment.  She was in residential rehab, which she completed about a week ago.  Last use of alcohol was about 35 days prior to admission to [facility].  Pt feels mood has been down for years and denies anything being helpful for her mood.  Per , mood has improved some since she got back from rehab.           Plan:  -Continue Lexapro 20mg daily  -Continue Wellbutrin SR 200mg daily  -Continue trazodone 200mg nightly  -Stop Zyprexa 7.5mg nightly  -Re-start naltrexone 50mg daily     - admit to ORP and initiate program protocol - patient oriented to rules and expectations of the program; while in program will monitor routine VS, breathalyzer and alcohol/drug screenings  - options for medication-assisted treatment (MAT) for alcohol use disorder were discussed  - routine monitoring of liver function while on MAT  - monitor random drug/alcohol screening  - routine monitoring of PETH levels to assess for maintenance of sobriety  - full engagement in 12 step (or equivalent) recovery program(s), including meeting attendance and acquisition/maintenance of sponsor  - relapse prevention and motivational interviewing provided        PRESENTATION:      Earl Abdul presents with the following chief complaint: alcohol and/or drug addiction     Per Chart:  Pt well known to addiction psychiatry service at [facility].  She last was seen by psychiatry 4/12/24  "for alcohol withdrawal.  She was started on naltrexone 50mg daily and lexapro was increased to 20mg daily.  After detox, she went to residential rehab.      Per Patient:  Pt completed 30 day residential rehab program in [place].  She says that she finished the program over a week ago.  No relapses since.  She still feels she has cravings.  She avoids going to grocery store where she was buying vodka.  Pt says that when she would enter the store, the associate would immediately set her bottle of vodka down on the counter since that's all she would ever get.  Pt says she is getting along wonderfully with her  and repeatedly states that he is wonderful.  She says she drinks because she doesn't have a sex life with her  over the past 20 years.  Many years ago they did go to couple's therapy, but she says it was unhelpful. Pt says she has also struggled coping with the death of her son, who  3 years ago of alcohol and drug use.  Pt denies ever drinking prior to the death of her son. Pt says she isn't thrilled about being in IOP but is here because her  wants it.  She reports being motivated to stay sober because she is concerned that she has been diagnosed with a fatty liver. She has been in AA before, but says that she doesn't like it.  Pt says she finds the stories depressing.  However, she says she is willing to go to AA again.       Mood has been "kind of what the fuck".  She feels mood has been down for years.  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  Energy has been down.  Concentration has been poor.  Endorses guilt, hopelessness, and worthlessness.  Denies SI/HI.  She's looking forward to going to the beach this summer with family and they are discussing plans to take a trip overseas.  Sleep has been poor for years.  No acute change in sleep.  She currently takes trazodone 200mg nightly, which she thinks is helpful.  When she doesn't take it, she says she doesn't sleep at all. "  Pt is unsure of all the medications she takes.  She says she manages her medications herself.  Denies symptoms suggestive of gabe.     Pt says she worries about everything and has always been a worrier.  She feels she spends the majority of the day worrying and that family would even comment on her worrying when she was a child.  She reports feeling physical tension, difficulty relaxing and whole body pain when feeling especially anxious.          Pt reports difficulty with memory that she first noticed about a year ago.  She notices she forgets names of people she has recently met, but remembers names always of people she has known for awhile.  Sometimes when driving in the car, she forgets where she is going.  She denies ever getting lost.         Collateral:   Pt provides permission to reach out to her  for collateral and to discuss her treatment with him.       Henrique Abdul,   [phone]      states she has not been drinking since discharge from [facility] on Tuesday or Wednesday of last week.  Last night she didn't sleep very well, but typically has been sleeping well with trazodone.  She has been having some memory issues for at least a year.  He has noticed issues with her short term memory.  She will forget things like what she had for dinner just a couple of hours after eating.  She will forget about plans they have made.  [facility] in [place] evaluated her for cognitive impairment in March 2024 and she was diagnosed with dementia.    She was driving prior to residential rehab without issues.  He took her key so she wouldn't drive because of concerns for drinking and driving.  He had briefly moved out of the house due to her drinking.  She has been drinking for 14 years.  First residential rehab was around 2010.  She has been to rehab about 15 times.  Several times she was sober for about 6 months at a time, but would slowly start drinking again.  In recent years she is only  sober when in a facility or 1-2 weeks after being out of a facility.  He has been helping her manage medications.  He fills the medications in her pill organizer weekly.  Denies paranoia and AVH.  Pt has had hallucinations due to withdrawal in the past, but never hallucinations outside of this context.  She was on naltrexone before, but it wasn't prescribed when she left rehab.  Zyprexa 7.5mg nightly was recently added in rehab.  He isn't sure why it was added and says the indication on the prescription bottle says for mood.          Current medications: Zyprexa 7.5mg nightly, trazodone 200mg nightly, lexapro 20mg daily, Wellbutrin SR 200mg daily              REVIEW OF SYSTEMS:     [x] Y  [] N  sleep disturbance: chronic poor sleep   [x] Y  [] N  appetite/weight change: increased appetite recently (pt recently put on prednisone and zyprexa)  [x] Y  [] N  fatigue/anergia:   [x] Y  [] N  impairment in focus/concentration:      [x] Y  [] N  depression:   [x] Y  [] N  anxiety/worry:   [] Y  [x] N  dysregulated mood/behavior:  [] Y  [x] N  manic symptomatology:   [] Y  [x] N  psychosis:         A pertinent medical review of systems was performed with the following notable findings: back pain     CURRENT PSYCHOTROPIC REGIMEN:  I[x]I Y  I[]I N  I[]I U    Zyprexa 7.5mg nightly, trazodone 200mg nightly, lexapro 20mg daily, Wellbutrin SR 200mg daily        ADDICTION:      I[]I N/A  I[]I Y  I[x]I N  I[]I U  WITHDRAWAL SYMPTOMS:   I[]I N/A  I[x]I Y  I[]I N  I[]I U  CRAVINGS:      I[]I Y  I[x]I N  I[]I U  I[]I Current  I[]I Former  Nicotine Use:   I[x]I Y  I[]I N  I[]I U  I[]I Current  I[]I Former  Alcohol Use:   I[x]I Y  I[]I N  I[]I U  I[]I Current  I[]I Former  Alcohol Misuse/Abuse:   I[]I Y  I[x]I N  I[]I U  I[]I Current  I[]I Former  Illicit Drug Use/Misuse/Abuse:   I[]I Y  I[x]I N  I[]I U  I[]I Current  I[]I Former  Misuse/Abuse of Rx Medications:   I[]I Cannabis  I[]I Cocaine  I[]I  "Heroin  I[]I Meth  I[]I Opioids  I[]I Stimulants  I[]I Benzos  I[]I Other:      I[]I N/A  I[]I U  Substance(s) of Choice: alcohol   I[]I N/A  I[]I U  Substance(s) Used Currently/Recently: alcohol  I[]I N/A  I[]I U  Alcohol Consumption: Was drinking 1/5th of vodka daily at peak use  I[]I N/A  I[]I U  Last Drink: About 35 days ago (Mid April)  I[x]I N/A  I[]I U  Last Drug Use:   I[]I N/A  I[]I U  Duration of Sobriety/Abstinence: About 35 days     I[x]I Y  I[]I N  I[]I U  Hx of Detox:   I[x]I Y  I[]I N  I[]I U  Hx of Rehab:   I[]I Y  I[x]I N  I[]I U  Hx of IVDU:   I[]I Y  I[x]I N  I[]I U  Hx of Accidental Overdose:   I[]I Y  I[x]I N  I[]I U  Hx of DUI:   I[x]I Y  I[]I N  I[]I U  Hx of Complicated Withdrawal (i.e. Seizures and/or Delirium Tremens):   I[]I Y  I[]I N  I[x]I U  Hx of Known/Suspected Substance-Induced Psychiatric Disorder:   I[x]I Y  I[]I N  I[]I U  Hx of Medication Assisted Treatment:   I[x]I Y  I[]I N  I[]I U  Hx of Twelve Step Program (or Equivalent) Involvement:   I[]I Y  I[x]I N  I[]I U  Currently Exhibits Signs of Intoxication:   I[]I Y  I[x]I N  I[]I U  Currently Exhibits Signs of Withdrawal:   I[]I Y  I[x]I N  I[]I U  Currently Active in Recovery:   I[x]I Y  I[]I N  I[]I U  Social Support:   I[x]I Y  I[]I N  I[]I U  Spouse/Partner Consumption:  doesn't drink in front of her and there is no alcohol in the home currently     I[]I N/A  I[x]I Y  I[]I N  I[]I U  Acknowledges/Accepts Addiction:   I[]I N/A  I[x]I Y  I[]I N  I[]I U  Advised to Quit/Cut Back:   I[]I N/A  I[x]I Y  I[]I N  I[]I U  Alcohol/Drug Cessation ("Wants to Quit"): Patient Successfully Quit, Motivational Interviewing Provided, Medication Assisted Treatment Prescribed  I[]I N/A  I[x]I Y  I[]I N  I[]I U  Motivation to Pursue Treatment: health (pt says she has fatty liver and beginnings of cirrhosis)  I[x]I N/A  I[]I Y  I[]I N  I[]I U  Tobacco Cessation ("Wants to Quit"):      I[]I N/A  I[x]I Y  I[]I N  I[]I U  I[]I A  Awareness of " Biomedical Complications:   I[]I N/A  I[x]I Y  I[]I N  I[]I U  I[]I A  Understands Need for Lifetime Sobriety:      View/Acceptance of Twelve Step (or Equivalent) Programs: states she hates AA, but is willing to go     DSM-5-TR SUBSTANCE USE DISORDER CRITERIA:      -- Impaired Control:  I[x]I Y  I[]I N  I[]I U  I[]I A  I[]I D  Often take in larger amounts or over a longer period of time than was intended:   I[x]I Y  I[]I N  I[]I U  I[]I A  I[]I D  Persistent desire or unsuccessful efforts to cut down or control use:   I[]I Y  I[]I N  I[x]I U  I[]I A  I[]I D  Great deal of time spent in activities necessary to obtain substance, use, or recover from effects:   I[x]I Y  I[]I N  I[]I U  I[]I A  I[]I D  Craving/strong desire for substance or urge to use:   -- Social Impairment:  I[x]I Y  I[]I N  I[]I U  I[]I A  I[]I D  Use resulting in failure to fulfill major role obligations at home, work or school:   I[x]I Y  I[]I N  I[]I U  I[]I A  I[]I D  Social, occupational, recreational activities decreased because of use:   I[]I Y  I[]I N  I[x]I U  I[]I A  I[]I D  Continued use despite having persistent or recurrent social or interpersonal problems caused or exacerbated by the substance:   -- Risky Use:  I[x]I Y  I[]I N  I[]I U  I[]I A  I[]I D  Recurrent use in situations in which it is physically hazardous:   I[x]I Y  I[]I N  I[]I U  I[]I A  I[]I D  Use despite physical or psychological problems that are likely to have been caused or exacerbated by the substance:   -- Neuroadaptation:  I[x]I Y  I[]I N  I[]I U  I[]I A  I[]I D  Tolerance, as defined by either of the following: (1) a need for markedly increased amounts of substance to achieve intoxication or desired effect.  -OR- (2) a markedly diminished effect with continued use of the same amount of substance:   I[x]I Y  I[]I N  I[]I U  I[]I A  I[]I D  Withdrawal, as manifested by either of the following: (1) the characteristic withdrawal syndrome for substance.  -OR- (2)  "substance is taken to relieve or avoid withdrawal symptoms:   -- Mild (1-3), Moderate (4-5), Severe (?6)     I[]I N/A  I[x]I Y  I[]I N  I[]I U  I[]I A  I[]I D  Active Substance Use Disorder:         HISTORY:      I[x]I Y  I[]I N  I[]I U  Psychiatric Diagnoses: major depressive disorder, generalized anxiety disorder   I[]I Y  I[x]I N  I[]I U  Current Psychiatric Provider (if Applicable):   I[]I Y  I[x]I N  I[]I U  Hx of Psychiatric Hospitalization:   I[]I Y  I[x]I N  I[]I U  Hx of Outpatient Psychiatric Treatment (psychiatry/psychotherapy): therapist "Geoffrey", has seen 5 years, has not seen since getting out of rehab  I[x]I Y  I[]I N  I[]I U  Psychotropic Trials: Cymbalta, lexapro, trazodone, seroquel, zyprexa, naltrexone  I[]I Y  I[x]I N  I[]I U  Prior Suicide Attempts:   I[x]I Y  I[]I N  I[]I U  Hx of Suicidal Ideation: yes, last about 5 months ago  I[]I Y  I[x]I N  I[]I U  Hx of Homicidal Ideation:   I[]I Y  I[x]I N  I[]I U  Hx of Self-Injurious Behavior (Non-Suicidal):   I[]I Y  I[x]I N  I[]I U  Hx of Violence:   I[]I Y  I[x]I N  I[]I U  Documented Hx of Malingering:      FAMILY HISTORY:  I[x]I Y  I[]I N  I[]I U    Son-alcohol and opioid abuse     I[]I Y  I[]I N  I[x]I U  Hx of Trauma/Neglect:   I[x]I Y  I[]I N  I[]I U  Hx of Physical Abuse:   I[]I Y  I[x]I N  I[]I U  Hx of Sexual Abuse:   I[]I Y  I[x]I N  I[]I U  Happy Childhood:   I[]I Y  I[x]I N  I[]I U  Significant Developmental Delay/Disability:   I[x]I Y  I[]I N  I[]I U  GED/High School Diploma or Beyond:   I[x]I Y  I[]I N  I[]I U  Currently Employed:   I[]I Y  I[x]I N  I[]I U  On or Applying for Disability:   I[x]I Y  I[]I N  I[]I U  Functions Independently:   I[]I Y  I[x]I N  I[]I U  Financially Stable:   I[x]I Y  I[]I N  I[]I U  Domiciled:   I[]I Y  I[x]I N  I[]I U  Lives Alone:   I[x]I Y  I[]I N  I[]I U  Intact Support System:   I[x]I Y  I[]I N  I[]I U  Heterosexual/Cisgender:   I[x]I Y  I[]I N  I[]I U  Currently in a Romantic Relationship:   I[x]I Y  " I[]I N  I[]I U  Ever :   I[x]I Y  I[]I N  I[]I U  Children/Dependents: one adult son, strained relationship  I[x]I Y  I[]I N  I[]I U  Mandaen/Spiritual: identifies as being spiritual  I[]I Y  I[x]I N  I[]I U   History:   I[]I Y  I[x]I N  I[]I U  Current Legal Issues:   I[]I Y  I[x]I N  I[]I U  Past Charges/Convictions:   I[]I Y  I[x]I N  I[]I U  Hx of Incarceration:   I[]I Y  I[]I N  I[x]I U  Access to a Gun?:      I[]I Y  I[]I N  I[x]I U  Hx of Seizure:   I[]I Y  I[x]I N  I[]I U  Hx of Significant Head Injury (e.g., Loss of Consciousness, Concussion, Coma):   I[]I Y  I[]I N  I[]I U  Medical History & Diagnoses:      The patient's past medical history has been reviewed and updated as appropriate within the electronic medical record system.  Pt with past medical history of COPD, HTN, HLD, dementia, subclinical hypothyroidism, T2DM, Afib with RVR, and bilateral mastectomy     Scheduled and PRN Medications: The electronic chart was reviewed and updated as appropriate.  See Medcard for details.    Current Medications      Current Outpatient Medications:     apixaban (ELIQUIS) 5 mg Tab, Take 1 tablet (5 mg total) by mouth 2 (two) times daily., Disp: 60 tablet, Rfl: 0    buPROPion (WELLBUTRIN SR) 200 MG SR12, Take 200 mg by mouth once daily., Disp: , Rfl:     EScitalopram oxalate (LEXAPRO) 20 MG tablet, Take 1 tablet (20 mg total) by mouth once daily., Disp: 30 tablet, Rfl: 0    folic acid (FOLVITE) 1 MG tablet, Take 1 tablet (1 mg total) by mouth once daily., Disp: 30 tablet, Rfl: 0    gabapentin (NEURONTIN) 300 MG capsule, Take 1 capsule (300 mg total) by mouth 3 (three) times daily. (Patient taking differently: Take 400 mg by mouth 3 (three) times daily.), Disp: 90 capsule, Rfl: 0    ibuprofen (ADVIL,MOTRIN) 400 MG tablet, Take 400 mg by mouth 3 (three) times daily., Disp: , Rfl:     lancets Misc, Use to check blood glucose 4 times daily, Disp: 100 each, Rfl: 5    levothyroxine (SYNTHROID) 75 MCG  tablet, Take 1 tablet (75 mcg total) by mouth before breakfast., Disp: 90 tablet, Rfl: 1    losartan (COZAAR) 25 MG tablet, Take 25 mg by mouth once daily., Disp: , Rfl:     metFORMIN (GLUCOPHAGE-XR) 500 MG ER 24hr tablet, Take 500 mg by mouth 2 (two) times daily with meals., Disp: , Rfl:     metoprolol succinate (TOPROL-XL) 50 MG 24 hr tablet, Take 1 tablet (50 mg total) by mouth once daily., Disp: 30 tablet, Rfl: 0    multivitamin Tab, Take 1 tablet by mouth once daily., Disp: 30 tablet, Rfl: 0    OLANZapine (ZYPREXA) 7.5 MG tablet, Take 7.5 mg by mouth every evening., Disp: , Rfl:     omeprazole (PRILOSEC) 20 MG capsule, Take 1 capsule (20 mg total) by mouth once daily., Disp: 30 capsule, Rfl: 0    ondansetron (ZOFRAN) 4 MG tablet, Take 4 mg by mouth daily as needed for Nausea., Disp: , Rfl:     predniSONE (DELTASONE) 20 MG tablet, Take 20 mg by mouth once daily., Disp: , Rfl:     traZODone (DESYREL) 100 MG tablet, Take 2 tablets (200 mg total) by mouth every evening., Disp: 60 tablet, Rfl: 11  No current facility-administered medications for this encounter.     Facility-Administered Medications Ordered in Other Encounters:     albuterol sulfate nebulizer solution 2.5 mg, 2.5 mg, Nebulization, Once, Andrew Rodriguez MD        Allergies:  Lortab [hydrocodone-acetaminophen], Promethazine, and Albuterol     PSYCHOSOCIAL FACTORS:  Stressors (Biopsychosocial, Cultural and Environmental): marriage, physical health, substance use/addiction, grief  Functioning Relationships: Reports good relationship with her      STRENGTHS AND LIABILITIES:   Strength: Patient is intelligent.  Strength: Patient has positive support network.  Strength: Patient is stable.  Strength: Patient is accepting of treatment.  Liability: Patient has poor health.  Liability: Patient is cognitively impaired.     Additional Relevant History, As Applicable:         EXAMINATION:      There were no vitals taken for this visit.     MENTAL  "STATUS EXAMINATION:  General Appearance: adequately groomed, appropriately dressed, in no apparent distress  Behavior:  normal; cooperative; reasonably friendly, pleasant, and polite; appropriate eye-contact; under good behavioral control  Involuntary Movements and Motor Activity: no abnormal involuntary movements noted, no psychomotor agitation or retardation  Gait and Station:intact, normal gait and station, ambulates without assistance  Speech and Language: intact; normal rate, rhythm, volume, tone, and pitch; conversational, spontaneous, and coherent; speaks and understands English proficiently and fluently; repeats words and phrases, no word finding difficulties are noted  Mood: "lackadaisical"  Affect: reactive, appropriate   Thought Process and Associations: mostly goal-directed, tangential at times   Thought Content and Perceptions: no suicidal or homicidal ideation, no auditory or visual hallucinations, no paranoid ideation, no ideas of reference, no evidence of delusions or psychosis  Sensorium: Oriented to person, place, situation, month, and year.  Disoriented to day of month   Recent and Remote Memory: Impaired to some degree, 3/3 immediate recall, 1/3 delayed recall  Attention and Concentration: Able to spell WORLD forwards and backwards  Fund of Knowledge:Impaired to some degree, names 1/3 past presidents  Insight:intact, demonstrates awareness of illness and situation  Judgment:  intact, behavior is adequate/appropriate given the circumstances        RISK MANAGEMENT:      I[]I Y  I[x]I N  I[]I U  I[]I A  Suicidal Ideation/Behavior:   I[]I Y  I[x]I N  I[]I U  I[]I A  Homicidal Ideation/Behavior:  I[]I Y  I[x]I N  I[]I U  I[]I A  Violence:   I[]I Y  I[x]I N  I[]I U  I[]I A  Self-Injurious Behavior:      The patient is deemed to be a well-meaning but unreliable historian.     I[]I Y  I[]I N  I[]I U  I[]I A  I[x]I N/A  Minimization of Risk Parameters Suspected/Evident:   I[]I Y  I[]I N  I[]I U  I[]I A  " I[x]I N/A  Exaggeration of Risk Parameters Suspected/Evident:         [] Y  [x] N  Danger to Self:   [] Y  [x] N  Danger to Others:   [] Y  [x] N  Grave Disability:      In cases of emergency, daily coverage provided by Acute/ER Psych MD, NP, PA, or SW, with contact numbers located in [facility] On Call Schedule.     Monie Frazier MD, PhD  [facility]  -4  05/20/2024           A diagnostic psychiatric evaluation was performed and responsiveness to treatment was assessed.  The patient demonstrates adequate ability/capacity to respond to treatment.     KEY:      I[]I Y = Yes / Present / Endorses  I[]I N = No / Absent / Denies  I[]I U = Unknown / Unable to Assess / Unwilling to Participate  I[]I A = Ambiguity Exists / Accuracy Uncertain  I[]I D = Denial or Minimization is Suspected/Evident  I[]I N/A = Non-Applicable     CHART REVIEW:      Available documentation has been reviewed, and pertinent elements of the chart have been incorporated into this evaluation where appropriate.     LA/MS  AWARE  Site reviewed - No recent discrepancies or irregularities are noted.        ADVICE AND COUNSELING:      [x] In cases of emergencies (e.g. SI/HI resulting in danger to self or others, functioning deteriorates to the level of grave disability), call 911 or [phone], or present to the emergency department for immediate assistance.  [x] Individuals should not operate a motor vehicle or heavy machinery if effects of medications or underlying symptoms/condition impair the ability to safely do so.     Alcohol, Tobacco, and Drug Counseling, as well as resources, has been provided, as warranted.      Shared medical decision making and informed consent are the hallmark and bedrock of good clinical care, and as such have been employed and obtained, respectively, to the degree possible.       Risk Mitigation Strategies, Harm Reduction Techniques, and Safety Netting are important interventions that can  reduce acute and chronic risk, and as such have been employed to the degree possible.     Prescription Drug Management entails the review, recommendation, or consideration without recommendation of medications, and as such was employed during the encounter.     Additional Psychoeducation has been provided, as warranted.     Discussed, to the extent possible, diagnosis, risks and benefits of proposed treatment vs alternative treatments vs no treatment, potential side effects of these treatments and the inherent unpredictability of treatment. The patient expresses understanding of the above and displays the capacity to agree with this treatment given said understanding. Patient also agrees that, currently, the benefits outweigh the risks and consents to treatment at this time.      Written material has been provided to supplement, augment, and reinforce any discussions and interventions, via the AVS or other pre-printed handouts, as warranted.        DIAGNOSTIC TESTING:      The chart was reviewed for recent diagnostic procedures and investigations, and pertinent results are noted below.      Glu 179 (H)  5/17/2024  Li *   *  TSH 0.943  5/17/2024    HgA1c 6.0 (H)  2/11/2024  VPA *   *   FT4 1.12  3/1/2024    Na 140  5/17/2024  CLZ *   *  WBC 25.39 (H)  5/17/2024    Cr 0.9  5/17/2024  ANC 25.0 (L)  5/17/2024   Hgb 10.6 (L)  5/17/2024     BUN 14  5/17/2024  Trop I <0.006  4/13/2024  HCT 36.1 (L)  5/17/2024     GFR >60  5/17/2024   CPK 75  5/17/2024    5/17/2024     Alb 4.1  5/17/2024   PRL *   *  B12 372  5/17/2024     T Bili 0.4  5/17/2024  Chol 184  4/6/2023  B9 15.9  5/17/2024    ALP 61  5/17/2024  TGs 161 (H)  4/6/2023  B1 136 (H)  *    AST 26  5/17/2024  HDL 44  4/6/2023  Vit D *   *     ALT 38  5/17/2024  .8  4/6/2023  HIV Non-reactive  4/11/2024     INR 1.0  3/2/2024  Baylee *   *   Hep C Non-reactive  4/11/2024    GGT *   *  Lip 16  4/13/2024  RPR *   *    MCV 86   5/17/2024   NH4 29  4/6/2023  UPT *   *       PETH 301  11/13/2023  THC Negative  4/11/2024    ETOH 112 (H)  4/13/2024  DESIREE Negative  4/11/2024    EtG Negative  11/13/2023  AMP Negative  4/11/2024    ALC 18 (A)  2/10/2024  OPI Negative  4/11/2024    BZO Negative  4/11/2024  MTD Negative  4/11/2024     BAR Negative  4/11/2024  BUP *   *    PCP Negative  4/11/2024  FEN Not Detected  7/18/2023            Results for orders placed or performed during the hospital encounter of 04/13/24   EKG 12-lead     Collection Time: 04/13/24  9:24 PM   Result Value Ref Range     QRS Duration 84 ms     [facility] QTC Calculation 433 ms     Narrative     Test Reason : R10.9,     Vent. Rate : 072 BPM     Atrial Rate : 072 BPM     P-R Int : 152 ms          QRS Dur : 084 ms      QT Int : 396 ms       P-R-T Axes : 071 079 070 degrees     QTc Int : 433 ms     Normal sinus rhythm  Normal ECG  When compared with ECG of 11-APR-2024 12:49,  Premature ventricular complexes are no longer Present  Confirmed by Sg STEPHENSON MD ([id number]) on 4/14/2024 9:24:50 AM     Referred By: [email or web address]   SELF           Confirmed By:Sg STEPHENSON MD             Results for orders placed or performed during the hospital encounter of 03/01/24   CT Head Without Contrast     Narrative     EXAMINATION:  CT HEAD WITHOUT CONTRAST     CLINICAL HISTORY:  Neuro deficit, acute, stroke suspected;left UE weakness started 1 week ago. She had Afib RVR y'day. CT head result will guide me to start anticoagulant. Thank you;     TECHNIQUE:  Low dose axial CT images obtained throughout the head without intravenous contrast. Sagittal and coronal reconstructions were performed.     COMPARISON:  CT head from 02/16/2024.     FINDINGS:  Ventricles and sulci are normal in size for age without evidence of hydrocephalus. No extra-axial blood or fluid collections.  Mild chronic microvascular ischemic changes, stable.  No parenchymal mass, hemorrhage, edema or major vascular distribution  infarct.     No calvarial fracture.  The scalp is unremarkable.  Bilateral paranasal sinuses and mastoid air cells are clear.        Impression     No acute intracranial abnormality.        Electronically signed by:Kaden Payne  Date:                                                           03/03/2024  Time:                                                           10:04   Results for orders placed or performed during the hospital encounter of 06/10/22   MRI Brain Without Contrast     Narrative     EXAMINATION:  MRI BRAIN WITHOUT CONTRAST     CLINICAL HISTORY:  hx of PRES;     TECHNIQUE:  Multiplanar multisequence MR imaging of the brain was performed without intravenous contrast.     COMPARISON:  Head CT 06/10/2022, brain MRI 10/04/2017, 07/21/2017     FINDINGS:  Intracranial Compartment:     Ventricles are normal in size for age without evidence of hydrocephalus.     Ill-defined focus T2-FLAIR signal hyperintensity in the right periatrial white matter.  Few additional scattered punctate foci elsewhere within the supratentorial white matter.  These appear similar to the prior study of 10/04/2017.  No definite new focal lesions.  No diffusion restriction to indicate an acute infarction.  No remote major vascular distribution infarct.  No recent or remote hemorrhage.  No mass effect or midline shift.     No extra-axial blood or fluid collections.     Normal vascular flow voids are preserved.     Skull/Extracranial Contents (limited evaluation):     Bone marrow signal intensity is normal.        Impression     No evidence of acute intracranial pathology.        Electronically signed by:Miguel Malagon MD  Date:                                                           06/10/2022  Time:                                                           09:45      *Note: Due to a large number of results and/or encounters for the requested time period, some results have not been displayed. A complete set of results can be found  in Results Review.

## 2024-05-20 NOTE — PROGRESS NOTES
Group Psychotherapy (PhD/LCSW)    Site: [facility]    Clinical status of patient: Intensive Outpatient Program (IOP)    Date: 5/20/2024    Group Focus: Distress Tolerance    Length of service: 45183 - 45-50 minutes    Number of patients in attendance: 12    Referred by: [facility]     Target symptoms: Alcohol Abuse    Patient's response to treatment: Active Listening and Feedback given to other patients.     Progress toward goals: Progressing adequately    Interval History: Session focus was Distress Tolerance:  Pros & Cons.  Patients were encouraged to use crisis survival skills to reduce intensity of distress.  They were taught to use Pros & Cons to reinforce desired behaviors.    Diagnosis:     ICD-10-CM ICD-9-CM   1. Alcohol use disorder, severe, dependence  F10.20 303.90   2. Depression with anxiety  F41.8 300.4   3. Fibromyalgia  M79.7 729.1     Plan: Continue treatment on ORP

## 2024-05-21 ENCOUNTER — HOSPITAL ENCOUNTER (OUTPATIENT)
Dept: PSYCHIATRY | Facility: HOSPITAL | Age: 64
Discharge: HOME | End: 2024-05-21
Attending: STUDENT IN AN ORGANIZED HEALTH CARE EDUCATION/TRAINING PROGRAM
Payer: COMMERCIAL

## 2024-05-21 DIAGNOSIS — F41.8 DEPRESSION WITH ANXIETY: ICD-10-CM

## 2024-05-21 DIAGNOSIS — F10.151 ALCOHOL ABUSE WITH ALCOHOL-INDUCED PSYCHOTIC DISORDER WITH HALLUCINATIONS: Primary | ICD-10-CM

## 2024-05-21 DIAGNOSIS — F10.20 ALCOHOL USE DISORDER, SEVERE, DEPENDENCE: ICD-10-CM

## 2024-05-21 DIAGNOSIS — M79.7 FIBROMYALGIA: ICD-10-CM

## 2024-05-21 DIAGNOSIS — Z78.9 ALCOHOL USE: ICD-10-CM

## 2024-05-21 DIAGNOSIS — Z79.899 HIGH RISK MEDICATION USE: ICD-10-CM

## 2024-05-21 LAB
AMPHET+METHAMPHET UR QL: NEGATIVE
BARBITURATES UR QL SCN>200 NG/ML: NEGATIVE
BENZODIAZ UR QL SCN>200 NG/ML: ABNORMAL
BZE UR QL SCN: NEGATIVE
CANNABINOIDS UR QL SCN: NEGATIVE
CREAT UR-MCNC: 99 MG/DL (ref 15–325)
ETHANOL UR-MCNC: <10 MG/DL
METHADONE UR QL SCN>300 NG/ML: NEGATIVE
OPIATES UR QL SCN: NEGATIVE
PCP UR QL SCN>25 NG/ML: NEGATIVE
TOXICOLOGY INFORMATION: ABNORMAL

## 2024-05-21 PROCEDURE — 99232 SBSQ HOSP IP/OBS MODERATE 35: CPT | Mod: ,,, | Performed by: PSYCHIATRY & NEUROLOGY

## 2024-05-21 PROCEDURE — 90853 GROUP PSYCHOTHERAPY: CPT | Mod: ,,, | Performed by: PSYCHOLOGIST

## 2024-05-21 PROCEDURE — 90853 GROUP PSYCHOTHERAPY: CPT | Performed by: SOCIAL WORKER

## 2024-05-21 PROCEDURE — 80307 DRUG TEST PRSMV CHEM ANLYZR: CPT | Performed by: PSYCHIATRY & NEUROLOGY

## 2024-05-21 NOTE — PLAN OF CARE
05/21/24 1100   Activity/Group Therapy Checklist   Group Addiction Education   Attendance Attended   Group Interactions/Observations Fell Asleep       
   05/21/24 1435   Activity/Group Therapy Checklist   Group Other (Comments)  (Triggers)   Attendance Did not attend     Pt did not attend group. Team made aware.   
[Negative] : Heme/Lymph

## 2024-05-21 NOTE — PROGRESS NOTES
Group Psychotherapy (PhD/LCSW)    Site: [facility]    Clinical status of patient: Intensive Outpatient Program (IOP)    Date: 5/21/2024    Group Focus: Interpersonal Effectiveness    Length of service: 60784 - 45-50 minutes    Number of patients in attendance: 13    Referred by: [facility]     Target symptoms: Alcohol Abuse    Patient's response to treatment: Active Listening and Feedback given to other patients.     Progress toward goals: Progressing adequately    Interval History: Session focus was Interpersonal Effectiveness: Recovering from Invalidation. Patient's identified types of invalidation and circumstances when invalidation was helpful. Patient's reviewed strategies for recovering from invalidation.     Diagnosis:     ICD-10-CM ICD-9-CM   1. 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 High risk medication use  Z79.899 V58.69   3. Alcohol use  Z78.9 V49.89   4. Alcohol use disorder, severe, dependence  F10.20 303.90   5. Depression with anxiety  F41.8 300.4   6. Fibromyalgia  M79.7 729.1     Plan: Continue treatment on ORP

## 2024-05-21 NOTE — PROGRESS NOTES
274}  FOLLOW UP VISIT: [facility]  DATE OF ADMISSION: 5/20/2024    ASSESSMENT AND PLAN:     DIAGNOSES & PROBLEMS:  Alcohol use disorder, severe  Major depressive disorder, recurrent moderate   Generalized anxiety disorder   Unspecified neurocognitive disorder    In Summary:  Pt is a 64yo female with past medical history of COPD, HTN, HLD, subclinical hypothyroidism, chronic lymphocytic leukemia, T2DM, Afib with RVR, fibromyalgia, and fatty liver, who presents to [facility] for continued treatment of alcohol use disorder. Pt presents to the [facility] at the insistence of her  for continued treatment. She was in residential rehab, which she completed about a week ago. Last use of alcohol was about 35 days prior to admission to [facility]. Pt feels mood has been down for years and denies anything being helpful for her mood. Per , mood has improved some since she got back from rehab.     Plan:  -Continue Lexapro 20mg daily  -Continue Wellbutrin SR 200mg daily  -Continue trazodone 200mg nightly  -Continue naltrexone 50mg daily    - continue ORP- patient oriented to rules and expectations of the program; while in program will monitor routine VS, breathalyzer and alcohol/drug screenings  -motivational interviewing provided to encourage the patient to remain in the program and continued alcohol cessation  - options for medication-assisted treatment (MAT) for alcohol use disorder were discussed  - routine monitoring of liver function while on MAT  - monitor random drug/alcohol screening  - routine monitoring of PETH levels to assess for maintenance of sobriety  - full engagement in 12 step (or equivalent) recovery program(s), including meeting attendance and acquisition/maintenance of sponsor  - relapse prevention and motivational interviewing provided      INTERVAL HISTORY AND ROS:     Pt states that she considered going to buy alcohol yesterday afternoon.  She says she felt down due to being in the program and  found herself thinking about her son's struggle with addiction more so yesterday.  Discussing addition often makes her think about how her son  due to drug use.  Because of this, she considered buying alcohol yesterday afternoon.  However, she didn't because she says that her marriage depends on her sobriety at this point and she is concerned about the impact alcohol can have on her health.  Instead of buying alcohol, she decided to watch tv and discuss it with her .  She feels he is very supportive.  Pt again stated that her major reason for drinking at this point in time is because her marriage lacks sex and intimacy.  She has done sex therapy with her  years ago and it was somewhat helpful.  She feels mood would be much better if their intimacy improved and she feels this is the biggest trigger for her to drink.  Overall she says she feels unattractive and unwanted due to lack of intimacy, which makes her feel depressed.      Pt says that she feels this is a good program, but she is disappointed to be in it.  She says the only reason she is in the program is because of the instance of her .  She believes she can remain sober without additional support.     Pt signed release of to obtain records from [facility], where she recently completed rehab and [facility], where she was evaluated for cognitive impairment.       WITHDRAWAL SYMPTOMS: no  CRAVINGS: yes    CURRENT PSYCHOTROPIC REGIMEN:  Lexapro 20mg daily  Wellbutrin SR 200mg daily  Trazodone 200mg nightly  Naltrexone 50mg daily       PERTINENT PAST HISTORY AND CHART REVIEW:     Pt well known to addiction psychiatry service at [facility]. She last was seen by psychiatry 24 for alcohol withdrawal. She was started on naltrexone 50mg daily and lexapro was increased to 20mg daily. After detox, she went to residential rehab.       EXAMINATION:     There were no vitals taken for this visit.    MENTAL STATUS EXAMINATION:  General  "Appearance & Behavior:  adequately groomed, appropriately dressed, in no apparent distress, under good behavioral control  Involuntary Movements and Motor Activity:  no abnormal involuntary movements noted, no psychomotor agitation or retardation  Speech & Language: conversational, spontaneous, speaks and understands English proficiently  Mood: "okay"  Affect: reactive, appropriate  Thought Process & Associations: mostly goal-directed, fixated on poor sexual relationship with her   Thought Content & Perceptions: no suicidal or homicidal ideation, no evidence of psychosis  Sensorium and Cognition: Alert, awake, oriented to person, place and situation  Insight & Judgment: **  intact, demonstrates awareness of illness and adequate/appropriate behavior given the circumstances      RISK MANAGEMENT:     I[]I Y  I[x]I N  I[]I U  I[]I A  Suicidal Ideation/Behavior:   I[]I Y  I[x]I N  I[]I U  I[]I A  Homicidal Ideation/Behavior:  I[]I Y  I[x]I N  I[]I U  I[]I A  Violence:   I[]I Y  I[x]I N  I[]I U  I[]I A  Self-Injurious Behavior:    The patient is deemed to be a well-meaning but somewhat inaccurate historian.    I[]I Y  I[]I N  I[]I U  I[x]I A  I[]I N/A  Minimization of Risk Parameters Suspected/Evident: **  I[]I Y  I[x]I N  I[]I U  I[]I A  I[]I N/A  Exaggeration of Risk Parameters Suspected/Evident:NA    [] Y  [x] N  Danger to Self:   [] Y  [x] N  Danger to Others:   [] Y  [x] N  Grave Disability:     Recommend continued treatment in ORP. Pt does not meet criteria for PEC or psychiatric admission     Add-On Psychotherapy:     [id number] Add-On Psychotherapy: 30 minutes (range 16-37 minutes)    NOTE: The time spent delivering Add-On Psychotherapy is separate from and in addition to the total time spent performing E/M services on the date of the encounter.    Duration of Intervention: 16 minutes  Primary Focus: motivational interviewing regarding remaining in the program and continued alcohol cessation, " exploring triggers for alcohol use   Participants: patient  Therapeutic Intervention Type: supportive, insight-oriented  Why Chosen Therapy is Appropriate Versus Another Modality: relevance, patient responds to this modality  Target Symptoms Addressed: alcohol abuse, addiction, depression  Topics and Themes Discussed: treatment compliance, therapeutic alliance and fostering of rapport, relationships difficulties  Psychotherapeutic Techniques Employed: active listening, clarification, empathic responses, psychoeducation, and motivational interviewing  Outcome Monitoring Methods: self-report  Response to the Intervention: accepting  Patient Progress Toward Treatment Goals: good       In cases of emergency, daily coverage provided by Acute/ER Psych MD, NP, PA, or SW, with contact numbers located in [facility] On Call Schedule.    Monie Frazier MD, PhD  [facility]  HO-4  05/21/2024        KEY:     I[]I Y = Yes / Present / Endorses  I[]I N = No / Absent / Denies  I[]I U = Unknown / Unable to Assess / Unwilling to Participate  I[]I A = Ambiguity Exists / Accuracy Uncertain  I[]I D = Denial or Minimization is Suspected/Evident  I[]I N/A = Non-Applicable    CHART REVIEW:     Available documentation has been reviewed, and pertinent elements of the chart - including previous psychiatric evaluations - have been incorporated into this evaluation where appropriate.    ADVICE AND COUNSELING:     [x] In cases of emergencies (e.g. SI/HI resulting in danger to self or others, functioning deteriorates to the level of grave disability), call 911 or [phone], or present to the emergency department for immediate assistance.  [x] Individuals should not operate a motor vehicle or heavy machinery if effects of medications or underlying symptoms/condition impair the ability to safely do so.    Alcohol, Tobacco, and Drug Counseling, as well as resources, has been provided, as warranted.     Shared medical decision making  and informed consent are the hallmark and bedrock of good clinical care, and as such have been employed and obtained, respectively, to the degree possible.      Risk Mitigation Strategies, Harm Reduction Techniques, and Safety Netting are important interventions that can reduce acute and chronic risk, and as such have been employed to the degree possible.    Prescription Drug Management entails the review, recommendation, or consideration without recommendation of medications, and as such was employed during the encounter.    Additional Psychoeducation has been provided, as warranted.    Discussed, to the extent possible, diagnosis, risks and benefits of proposed treatment vs alternative treatments vs no treatment, potential side effects of these treatments and the inherent unpredictability of treatment. The patient expresses understanding of the above and displays the capacity to agree with this treatment given said understanding. Patient also agrees that, currently, the benefits outweigh the risks and consents to treatment at this time.     Written materialhas been provided to supplement, augment, and reinforce any discussions and interventions, via the AVS or other pre-printed handouts, as warranted.      DIAGNOSTIC TESTING:     The chart was reviewed for recent diagnostic procedures and investigations, and pertinent results are noted below.     Glu 179 (H)  5/17/2024  Li *   *  TSH 0.943  5/17/2024    HgA1c 6.0 (H)  2/11/2024  VPA *   *   FT4 1.12  3/1/2024    Na 140  5/17/2024  CLZ *   *  WBC 25.39 (H)  5/17/2024    Cr 0.9  5/17/2024  ANC 25.0 (L)  5/17/2024   Hgb 10.6 (L)  5/17/2024     BUN 14  5/17/2024  Trop I <0.006  4/13/2024  HCT 36.1 (L)  5/17/2024     GFR >60  5/17/2024   CPK 75  5/17/2024    5/17/2024     Alb 4.1  5/17/2024   PRL *   *  B12 372  5/17/2024     T Bili 0.4  5/17/2024  Chol 184  4/6/2023  B9 15.9  5/17/2024    ALP 61  5/17/2024  TGs 161 (H)  4/6/2023  B1  136 (H)  *    AST 26  5/17/2024  HDL 44  4/6/2023  Vit D *   *     ALT 38  5/17/2024  .8  4/6/2023  HIV Non-reactive  4/11/2024     INR 1.0  3/2/2024  Baylee *   *   Hep C Non-reactive  4/11/2024    GGT *   *  Lip 16  4/13/2024  RPR *   *    MCV 86  5/17/2024   NH4 29  4/6/2023  UPT *   *      PETH 301  11/13/2023  THC Negative  4/11/2024    ETOH 112 (H)  4/13/2024  DESIREE Negative  4/11/2024    EtG Negative  11/13/2023  AMP Negative  4/11/2024    ALC 18 (A)  2/10/2024  OPI Negative  4/11/2024    BZO Negative  4/11/2024  MTD Negative  4/11/2024     BAR Negative  4/11/2024  BUP *   *    PCP Negative  4/11/2024  FEN Not Detected  7/18/2023

## 2024-05-21 NOTE — PLAN OF CARE
05/20/24 1300   Activity/Group Therapy Checklist   Group Goals/Reflection   Attendance Attended   Follows Direction Followed directions   Group Interactions/Observations Interacted appropriately   Affect/Mood Range Normal range   Affect/Mood Display Appropriate

## 2024-05-22 LAB — VIT B1 BLD-MCNC: 114 UG/L (ref 38–122)

## 2024-05-22 RX ORDER — NALTREXONE HYDROCHLORIDE 50 MG/1
50 TABLET, FILM COATED ORAL DAILY
Qty: 30 TABLET | Refills: 0 | Status: ON HOLD | OUTPATIENT
Start: 2024-05-22 | End: 2024-06-09

## 2024-05-23 LAB
AMPHET+METHAMPHET UR QL: NEGATIVE
BARBITURATES UR QL SCN>200 NG/ML: NEGATIVE
BENZODIAZ UR QL SCN>200 NG/ML: ABNORMAL
BZE UR QL SCN: NEGATIVE
CANNABINOIDS UR QL SCN: NEGATIVE
CREAT UR-MCNC: 58 MG/DL (ref 15–325)
ETHANOL UR-MCNC: <10 MG/DL
ETHYL GLUCURONIDE: NEGATIVE NG/ML
METHADONE UR QL SCN>300 NG/ML: NEGATIVE
OPIATES UR QL SCN: NEGATIVE
PCP UR QL SCN>25 NG/ML: NEGATIVE
TOXICOLOGY INFORMATION: ABNORMAL

## 2024-05-24 LAB — ETHYL GLUCURONIDE: ABNORMAL NG/ML

## 2024-05-24 NOTE — PROGRESS NOTES
Plan of Care    Pt has only been present for first two days of the [facility].  The following two days she missed. Pt has not communicated with the program and is not answering her phone.  Per  this AM, she reportedly said that she will not be returning to the program and she started drinking vodka again yesterday.  Pt was reluctant to be in the program.  On initial intake, she stated she was on in the program at the instance of her  and she didn't feel that it was necessary.        While in the program, she did not require detox as she had recently completed inpatient residential rehab and had not drank any alcohol in about 35 days.  Pt has significant cognitive deficits which may limit her ability to participate in the program.  She was lost several times within the hospital on Day 2, was unable to remember her medications, and would forget information that was discussed with her.  Per , she does require assistance at home and he manages all her medications.  Pt has had extensive workup for cognitive impairment at the [facility] just a few months ago.  Those records, as well as records from the residential rehab, were requested but never received.      In the future, patient is likely not a good candidate for the [facility] as she has had difficulty engaging with the program upon multiple attempts at this point.        Monie Frazier MD, PhD  \[facility] [place] [facility]\""[facility]  HO-4  05/24/2024

## 2024-05-26 LAB
ETHYL GLUC/SULFATE INTERPRETATION: NORMAL
ETHYL GLUCURONIDE UR CFM-MCNC: NEGATIVE NG/ML
ETHYL SULFATE UR CFM-MCNC: NEGATIVE NG/ML

## 2024-06-07 ENCOUNTER — HOSPITAL ENCOUNTER (INPATIENT)
Facility: HOSPITAL | Age: 64
LOS: 2 days | Discharge: HOME | DRG: 897 | End: 2024-06-09
Attending: EMERGENCY MEDICINE | Admitting: FAMILY MEDICINE
Payer: COMMERCIAL

## 2024-06-07 DIAGNOSIS — R11.0 NAUSEA: ICD-10-CM

## 2024-06-07 DIAGNOSIS — F10.930 ALCOHOL WITHDRAWAL SYNDROME WITHOUT COMPLICATION: Primary | ICD-10-CM

## 2024-06-07 DIAGNOSIS — R45.851 SUICIDAL IDEATION: ICD-10-CM

## 2024-06-07 DIAGNOSIS — R00.0 TACHYCARDIA: ICD-10-CM

## 2024-06-07 DIAGNOSIS — R09.02 HYPOXIA: ICD-10-CM

## 2024-06-07 DIAGNOSIS — R19.7 DIARRHEA, UNSPECIFIED TYPE: ICD-10-CM

## 2024-06-07 DIAGNOSIS — R07.9 CHEST PAIN: ICD-10-CM

## 2024-06-07 LAB
ALBUMIN SERPL BCP-MCNC: 4.3 G/DL (ref 3.5–5.2)
ALP SERPL-CCNC: 63 U/L (ref 55–135)
ALT SERPL W/O P-5'-P-CCNC: 18 U/L (ref 10–44)
AMPHET+METHAMPHET UR QL: NEGATIVE
ANION GAP SERPL CALC-SCNC: 20 MMOL/L (ref 8–16)
ANISOCYTOSIS BLD QL SMEAR: SLIGHT
ANISOCYTOSIS BLD QL SMEAR: SLIGHT
APAP SERPL-MCNC: 17 UG/ML (ref 10–20)
AST SERPL-CCNC: 47 U/L (ref 10–40)
BACTERIA #/AREA URNS AUTO: ABNORMAL /HPF
BARBITURATES UR QL SCN>200 NG/ML: NEGATIVE
BASOPHILS # BLD AUTO: 0.06 K/UL (ref 0–0.2)
BASOPHILS NFR BLD: 0 % (ref 0–1.9)
BASOPHILS NFR BLD: 0.2 % (ref 0–1.9)
BENZODIAZ UR QL SCN>200 NG/ML: NEGATIVE
BILIRUB SERPL-MCNC: 0.6 MG/DL (ref 0.1–1)
BILIRUB UR QL STRIP: NEGATIVE
BUN SERPL-MCNC: 16 MG/DL (ref 8–23)
BZE UR QL SCN: NEGATIVE
CALCIUM SERPL-MCNC: 8.6 MG/DL (ref 8.7–10.5)
CANNABINOIDS UR QL SCN: NEGATIVE
CHLORIDE SERPL-SCNC: 98 MMOL/L (ref 95–110)
CLARITY UR REFRACT.AUTO: CLEAR
CO2 SERPL-SCNC: 20 MMOL/L (ref 23–29)
COLOR UR AUTO: ABNORMAL
CREAT SERPL-MCNC: 0.9 MG/DL (ref 0.5–1.4)
CREAT UR-MCNC: 35 MG/DL (ref 15–325)
DIFFERENTIAL METHOD BLD: ABNORMAL
DIFFERENTIAL METHOD BLD: ABNORMAL
EOSINOPHIL # BLD AUTO: 0 K/UL (ref 0–0.5)
EOSINOPHIL NFR BLD: 0 % (ref 0–8)
EOSINOPHIL NFR BLD: 0.1 % (ref 0–8)
ERYTHROCYTE [DISTWIDTH] IN BLOOD BY AUTOMATED COUNT: 20.4 % (ref 11.5–14.5)
ERYTHROCYTE [DISTWIDTH] IN BLOOD BY AUTOMATED COUNT: 20.7 % (ref 11.5–14.5)
EST. GFR  (NO RACE VARIABLE): >60 ML/MIN/1.73 M^2
ETHANOL SERPL-MCNC: 242 MG/DL
GLUCOSE SERPL-MCNC: 129 MG/DL (ref 70–110)
GLUCOSE SERPL-MCNC: 61 MG/DL (ref 70–110)
GLUCOSE UR QL STRIP: NEGATIVE
HCT VFR BLD AUTO: 32.9 % (ref 37–48.5)
HCT VFR BLD AUTO: 34.3 % (ref 37–48.5)
HGB BLD-MCNC: 9.9 G/DL (ref 12–16)
HGB BLD-MCNC: 9.9 G/DL (ref 12–16)
HGB UR QL STRIP: NEGATIVE
HYPOCHROMIA BLD QL SMEAR: ABNORMAL
IMM GRANULOCYTES # BLD AUTO: 0.14 K/UL (ref 0–0.04)
IMM GRANULOCYTES # BLD AUTO: ABNORMAL K/UL (ref 0–0.04)
IMM GRANULOCYTES NFR BLD AUTO: 0.4 % (ref 0–0.5)
IMM GRANULOCYTES NFR BLD AUTO: ABNORMAL % (ref 0–0.5)
KETONES UR QL STRIP: ABNORMAL
LEUKOCYTE ESTERASE UR QL STRIP: ABNORMAL
LIPASE SERPL-CCNC: 38 U/L (ref 4–60)
LYMPHOCYTES # BLD AUTO: 29.2 K/UL (ref 1–4.8)
LYMPHOCYTES NFR BLD: 86.5 % (ref 18–48)
LYMPHOCYTES NFR BLD: 90.5 % (ref 18–48)
MAGNESIUM SERPL-MCNC: 1.9 MG/DL (ref 1.6–2.6)
MCH RBC QN AUTO: 25.8 PG (ref 27–31)
MCH RBC QN AUTO: 26.4 PG (ref 27–31)
MCHC RBC AUTO-ENTMCNC: 28.9 G/DL (ref 32–36)
MCHC RBC AUTO-ENTMCNC: 30.1 G/DL (ref 32–36)
MCV RBC AUTO: 88 FL (ref 82–98)
MCV RBC AUTO: 89 FL (ref 82–98)
METHADONE UR QL SCN>300 NG/ML: NEGATIVE
MICROSCOPIC COMMENT: ABNORMAL
MONOCYTES # BLD AUTO: 0.8 K/UL (ref 0.3–1)
MONOCYTES NFR BLD: 1 % (ref 4–15)
MONOCYTES NFR BLD: 2.4 % (ref 4–15)
NEUTROPHILS # BLD AUTO: 3.6 K/UL (ref 1.8–7.7)
NEUTROPHILS NFR BLD: 10.4 % (ref 38–73)
NEUTROPHILS NFR BLD: 8.5 % (ref 38–73)
NITRITE UR QL STRIP: NEGATIVE
NRBC BLD-RTO: 0 /100 WBC
NRBC BLD-RTO: 0 /100 WBC
OHS QRS DURATION: 94 MS
OHS QRS DURATION: 96 MS
OHS QTC CALCULATION: 443 MS
OHS QTC CALCULATION: 454 MS
OPIATES UR QL SCN: NEGATIVE
PATH REV BLD -IMP: NORMAL
PCP UR QL SCN>25 NG/ML: NEGATIVE
PH UR STRIP: 6 [PH] (ref 5–8)
PLATELET # BLD AUTO: 103 K/UL (ref 150–450)
PLATELET # BLD AUTO: 144 K/UL (ref 150–450)
PLATELET BLD QL SMEAR: ABNORMAL
PMV BLD AUTO: 9.6 FL (ref 9.2–12.9)
PMV BLD AUTO: 9.6 FL (ref 9.2–12.9)
POCT GLUCOSE: 129 MG/DL (ref 70–110)
POCT GLUCOSE: 43 MG/DL (ref 70–110)
POCT GLUCOSE: 77 MG/DL (ref 70–110)
POCT GLUCOSE: 84 MG/DL (ref 70–110)
POIKILOCYTOSIS BLD QL SMEAR: SLIGHT
POLYCHROMASIA BLD QL SMEAR: ABNORMAL
POTASSIUM SERPL-SCNC: 4.2 MMOL/L (ref 3.5–5.1)
PROT SERPL-MCNC: 7.2 G/DL (ref 6–8.4)
PROT UR QL STRIP: NEGATIVE
RBC # BLD AUTO: 3.75 M/UL (ref 4–5.4)
RBC # BLD AUTO: 3.84 M/UL (ref 4–5.4)
RBC #/AREA URNS AUTO: 0 /HPF (ref 0–4)
SMUDGE CELLS BLD QL SMEAR: PRESENT
SMUDGE CELLS BLD QL SMEAR: PRESENT
SODIUM SERPL-SCNC: 138 MMOL/L (ref 136–145)
SP GR UR STRIP: 1.01 (ref 1–1.03)
SQUAMOUS #/AREA URNS AUTO: 1 /HPF
STOMATOCYTES BLD QL SMEAR: PRESENT
TOXICOLOGY INFORMATION: NORMAL
TSH SERPL DL<=0.005 MIU/L-ACNC: 1.64 UIU/ML (ref 0.4–4)
URN SPEC COLLECT METH UR: ABNORMAL
WBC # BLD AUTO: 33.81 K/UL (ref 3.9–12.7)
WBC # BLD AUTO: 62.08 K/UL (ref 3.9–12.7)
WBC #/AREA URNS AUTO: 6 /HPF (ref 0–5)

## 2024-06-07 PROCEDURE — 21400001 HC TELEMETRY ROOM

## 2024-06-07 PROCEDURE — 51701 INSERT BLADDER CATHETER: CPT

## 2024-06-07 PROCEDURE — 85027 COMPLETE CBC AUTOMATED: CPT | Mod: 91

## 2024-06-07 PROCEDURE — 84443 ASSAY THYROID STIM HORMONE: CPT | Performed by: STUDENT IN AN ORGANIZED HEALTH CARE EDUCATION/TRAINING PROGRAM

## 2024-06-07 PROCEDURE — 83735 ASSAY OF MAGNESIUM: CPT | Performed by: STUDENT IN AN ORGANIZED HEALTH CARE EDUCATION/TRAINING PROGRAM

## 2024-06-07 PROCEDURE — 96374 THER/PROPH/DIAG INJ IV PUSH: CPT

## 2024-06-07 PROCEDURE — 25000003 PHARM REV CODE 250

## 2024-06-07 PROCEDURE — 93005 ELECTROCARDIOGRAM TRACING: CPT

## 2024-06-07 PROCEDURE — 80307 DRUG TEST PRSMV CHEM ANLYZR: CPT | Performed by: STUDENT IN AN ORGANIZED HEALTH CARE EDUCATION/TRAINING PROGRAM

## 2024-06-07 PROCEDURE — 96376 TX/PRO/DX INJ SAME DRUG ADON: CPT

## 2024-06-07 PROCEDURE — 96361 HYDRATE IV INFUSION ADD-ON: CPT

## 2024-06-07 PROCEDURE — 80143 DRUG ASSAY ACETAMINOPHEN: CPT | Performed by: STUDENT IN AN ORGANIZED HEALTH CARE EDUCATION/TRAINING PROGRAM

## 2024-06-07 PROCEDURE — 93010 ELECTROCARDIOGRAM REPORT: CPT | Mod: ,,, | Performed by: INTERNAL MEDICINE

## 2024-06-07 PROCEDURE — 63600175 PHARM REV CODE 636 W HCPCS: Performed by: STUDENT IN AN ORGANIZED HEALTH CARE EDUCATION/TRAINING PROGRAM

## 2024-06-07 PROCEDURE — 36415 COLL VENOUS BLD VENIPUNCTURE: CPT

## 2024-06-07 PROCEDURE — 83690 ASSAY OF LIPASE: CPT | Performed by: STUDENT IN AN ORGANIZED HEALTH CARE EDUCATION/TRAINING PROGRAM

## 2024-06-07 PROCEDURE — 81001 URINALYSIS AUTO W/SCOPE: CPT | Performed by: STUDENT IN AN ORGANIZED HEALTH CARE EDUCATION/TRAINING PROGRAM

## 2024-06-07 PROCEDURE — 82077 ASSAY SPEC XCP UR&BREATH IA: CPT | Performed by: STUDENT IN AN ORGANIZED HEALTH CARE EDUCATION/TRAINING PROGRAM

## 2024-06-07 PROCEDURE — 85007 BL SMEAR W/DIFF WBC COUNT: CPT | Mod: 91

## 2024-06-07 PROCEDURE — 85025 COMPLETE CBC W/AUTO DIFF WBC: CPT

## 2024-06-07 PROCEDURE — 51798 US URINE CAPACITY MEASURE: CPT

## 2024-06-07 PROCEDURE — 63600175 PHARM REV CODE 636 W HCPCS

## 2024-06-07 PROCEDURE — 80053 COMPREHEN METABOLIC PANEL: CPT | Performed by: STUDENT IN AN ORGANIZED HEALTH CARE EDUCATION/TRAINING PROGRAM

## 2024-06-07 PROCEDURE — 85060 BLOOD SMEAR INTERPRETATION: CPT | Mod: ,,, | Performed by: PATHOLOGY

## 2024-06-07 PROCEDURE — 25000003 PHARM REV CODE 250: Performed by: STUDENT IN AN ORGANIZED HEALTH CARE EDUCATION/TRAINING PROGRAM

## 2024-06-07 PROCEDURE — 99285 EMERGENCY DEPT VISIT HI MDM: CPT | Mod: 25

## 2024-06-07 PROCEDURE — 96375 TX/PRO/DX INJ NEW DRUG ADDON: CPT

## 2024-06-07 PROCEDURE — 85027 COMPLETE CBC AUTOMATED: CPT | Performed by: STUDENT IN AN ORGANIZED HEALTH CARE EDUCATION/TRAINING PROGRAM

## 2024-06-07 PROCEDURE — 85007 BL SMEAR W/DIFF WBC COUNT: CPT | Performed by: STUDENT IN AN ORGANIZED HEALTH CARE EDUCATION/TRAINING PROGRAM

## 2024-06-07 RX ORDER — LEVOTHYROXINE SODIUM 75 UG/1
75 TABLET ORAL
Status: DISCONTINUED | OUTPATIENT
Start: 2024-06-08 | End: 2024-06-09 | Stop reason: HOSPADM

## 2024-06-07 RX ORDER — DROPERIDOL 2.5 MG/ML
0.62 INJECTION, SOLUTION INTRAMUSCULAR; INTRAVENOUS
Status: COMPLETED | OUTPATIENT
Start: 2024-06-07 | End: 2024-06-07

## 2024-06-07 RX ORDER — DIPHENHYDRAMINE HYDROCHLORIDE 50 MG/ML
25 INJECTION INTRAMUSCULAR; INTRAVENOUS
Status: COMPLETED | OUTPATIENT
Start: 2024-06-07 | End: 2024-06-07

## 2024-06-07 RX ORDER — DIAZEPAM 10 MG/2ML
5 INJECTION INTRAMUSCULAR
Status: COMPLETED | OUTPATIENT
Start: 2024-06-07 | End: 2024-06-07

## 2024-06-07 RX ORDER — METOPROLOL SUCCINATE 50 MG/1
50 TABLET, EXTENDED RELEASE ORAL DAILY
Status: DISCONTINUED | OUTPATIENT
Start: 2024-06-07 | End: 2024-06-09 | Stop reason: HOSPADM

## 2024-06-07 RX ORDER — THIAMINE HCL 100 MG
100 TABLET ORAL
Status: COMPLETED | OUTPATIENT
Start: 2024-06-07 | End: 2024-06-07

## 2024-06-07 RX ORDER — TALC
6 POWDER (GRAM) TOPICAL NIGHTLY PRN
Status: DISCONTINUED | OUTPATIENT
Start: 2024-06-07 | End: 2024-06-09

## 2024-06-07 RX ORDER — SODIUM CHLORIDE, SODIUM LACTATE, POTASSIUM CHLORIDE, CALCIUM CHLORIDE 600; 310; 30; 20 MG/100ML; MG/100ML; MG/100ML; MG/100ML
INJECTION, SOLUTION INTRAVENOUS CONTINUOUS
Status: DISCONTINUED | OUTPATIENT
Start: 2024-06-07 | End: 2024-06-08

## 2024-06-07 RX ORDER — FAMOTIDINE 10 MG/ML
20 INJECTION INTRAVENOUS
Status: COMPLETED | OUTPATIENT
Start: 2024-06-07 | End: 2024-06-07

## 2024-06-07 RX ORDER — DIAZEPAM 5 MG/1
10 TABLET ORAL 4 TIMES DAILY
Status: DISCONTINUED | OUTPATIENT
Start: 2024-06-07 | End: 2024-06-07

## 2024-06-07 RX ORDER — ENOXAPARIN SODIUM 100 MG/ML
40 INJECTION SUBCUTANEOUS EVERY 24 HOURS
Status: DISCONTINUED | OUTPATIENT
Start: 2024-06-07 | End: 2024-06-07

## 2024-06-07 RX ORDER — NALOXONE HCL 0.4 MG/ML
0.02 VIAL (ML) INJECTION
Status: DISCONTINUED | OUTPATIENT
Start: 2024-06-07 | End: 2024-06-09 | Stop reason: HOSPADM

## 2024-06-07 RX ORDER — DIAZEPAM 10 MG/2ML
10 INJECTION INTRAMUSCULAR
Status: COMPLETED | OUTPATIENT
Start: 2024-06-07 | End: 2024-06-08

## 2024-06-07 RX ORDER — DIAZEPAM 5 MG/1
10 TABLET ORAL 4 TIMES DAILY
Status: DISCONTINUED | OUTPATIENT
Start: 2024-06-07 | End: 2024-06-08

## 2024-06-07 RX ORDER — LEVALBUTEROL INHALATION SOLUTION 0.63 MG/3ML
0.31 SOLUTION RESPIRATORY (INHALATION) EVERY 8 HOURS PRN
Status: DISCONTINUED | OUTPATIENT
Start: 2024-06-07 | End: 2024-06-09 | Stop reason: HOSPADM

## 2024-06-07 RX ORDER — LOSARTAN POTASSIUM 25 MG/1
25 TABLET ORAL DAILY
Status: DISCONTINUED | OUTPATIENT
Start: 2024-06-07 | End: 2024-06-08

## 2024-06-07 RX ORDER — DIAZEPAM 10 MG/2ML
5 INJECTION INTRAMUSCULAR EVERY 4 HOURS PRN
Status: DISCONTINUED | OUTPATIENT
Start: 2024-06-07 | End: 2024-06-09 | Stop reason: HOSPADM

## 2024-06-07 RX ORDER — SODIUM CHLORIDE 0.9 % (FLUSH) 0.9 %
10 SYRINGE (ML) INJECTION EVERY 12 HOURS PRN
Status: DISCONTINUED | OUTPATIENT
Start: 2024-06-07 | End: 2024-06-09 | Stop reason: HOSPADM

## 2024-06-07 RX ORDER — IBUPROFEN 200 MG
16 TABLET ORAL
Status: DISCONTINUED | OUTPATIENT
Start: 2024-06-07 | End: 2024-06-09 | Stop reason: HOSPADM

## 2024-06-07 RX ORDER — FOLIC ACID 1 MG/1
1 TABLET ORAL
Status: COMPLETED | OUTPATIENT
Start: 2024-06-07 | End: 2024-06-07

## 2024-06-07 RX ORDER — IBUPROFEN 200 MG
24 TABLET ORAL
Status: DISCONTINUED | OUTPATIENT
Start: 2024-06-07 | End: 2024-06-09 | Stop reason: HOSPADM

## 2024-06-07 RX ORDER — GLUCAGON 1 MG
1 KIT INJECTION
Status: DISCONTINUED | OUTPATIENT
Start: 2024-06-07 | End: 2024-06-09 | Stop reason: HOSPADM

## 2024-06-07 RX ORDER — LEVALBUTEROL INHALATION SOLUTION 0.31 MG/3ML
0.31 SOLUTION RESPIRATORY (INHALATION) EVERY 8 HOURS PRN
Status: DISCONTINUED | OUTPATIENT
Start: 2024-06-07 | End: 2024-06-07

## 2024-06-07 RX ORDER — ESCITALOPRAM OXALATE 20 MG/1
20 TABLET ORAL DAILY
Status: DISCONTINUED | OUTPATIENT
Start: 2024-06-07 | End: 2024-06-09 | Stop reason: HOSPADM

## 2024-06-07 RX ORDER — ONDANSETRON HYDROCHLORIDE 2 MG/ML
4 INJECTION, SOLUTION INTRAVENOUS EVERY 6 HOURS PRN
Status: DISCONTINUED | OUTPATIENT
Start: 2024-06-07 | End: 2024-06-09 | Stop reason: HOSPADM

## 2024-06-07 RX ADMIN — Medication 100 MG: at 12:06

## 2024-06-07 RX ADMIN — METOPROLOL SUCCINATE 50 MG: 50 TABLET, EXTENDED RELEASE ORAL at 02:06

## 2024-06-07 RX ADMIN — Medication 24 G: at 01:06

## 2024-06-07 RX ADMIN — DIPHENHYDRAMINE HYDROCHLORIDE 25 MG: 50 INJECTION, SOLUTION INTRAMUSCULAR; INTRAVENOUS at 08:06

## 2024-06-07 RX ADMIN — DIAZEPAM 10 MG: 5 TABLET ORAL at 05:06

## 2024-06-07 RX ADMIN — FAMOTIDINE 20 MG: 10 INJECTION, SOLUTION INTRAVENOUS at 07:06

## 2024-06-07 RX ADMIN — FOLIC ACID 1 MG: 1 TABLET ORAL at 12:06

## 2024-06-07 RX ADMIN — SODIUM CHLORIDE, POTASSIUM CHLORIDE, SODIUM LACTATE AND CALCIUM CHLORIDE: 600; 310; 30; 20 INJECTION, SOLUTION INTRAVENOUS at 09:06

## 2024-06-07 RX ADMIN — OLANZAPINE 7.5 MG: 5 TABLET, FILM COATED ORAL at 09:06

## 2024-06-07 RX ADMIN — DIAZEPAM 10 MG: 5 TABLET ORAL at 09:06

## 2024-06-07 RX ADMIN — DIAZEPAM 5 MG: 10 INJECTION, SOLUTION INTRAMUSCULAR; INTRAVENOUS at 02:06

## 2024-06-07 RX ADMIN — DIAZEPAM 5 MG: 5 INJECTION, SOLUTION INTRAMUSCULAR; INTRAVENOUS at 11:06

## 2024-06-07 RX ADMIN — APIXABAN 5 MG: 5 TABLET, FILM COATED ORAL at 09:06

## 2024-06-07 RX ADMIN — THIAMINE HYDROCHLORIDE 500 MG: 100 INJECTION, SOLUTION INTRAMUSCULAR; INTRAVENOUS at 02:06

## 2024-06-07 RX ADMIN — DROPERIDOL 0.62 MG: 2.5 INJECTION, SOLUTION INTRAMUSCULAR; INTRAVENOUS at 07:06

## 2024-06-07 RX ADMIN — SODIUM CHLORIDE, POTASSIUM CHLORIDE, SODIUM LACTATE AND CALCIUM CHLORIDE 1000 ML: 600; 310; 30; 20 INJECTION, SOLUTION INTRAVENOUS at 08:06

## 2024-06-07 RX ADMIN — ESCITALOPRAM OXALATE 20 MG: 5 TABLET, FILM COATED ORAL at 02:06

## 2024-06-07 RX ADMIN — SODIUM CHLORIDE, POTASSIUM CHLORIDE, SODIUM LACTATE AND CALCIUM CHLORIDE 1000 ML: 600; 310; 30; 20 INJECTION, SOLUTION INTRAVENOUS at 05:06

## 2024-06-07 RX ADMIN — THERA TABS 1 TABLET: TAB at 12:06

## 2024-06-07 RX ADMIN — LOSARTAN POTASSIUM 25 MG: 25 TABLET, FILM COATED ORAL at 02:06

## 2024-06-07 RX ADMIN — THIAMINE HYDROCHLORIDE 500 MG: 100 INJECTION, SOLUTION INTRAMUSCULAR; INTRAVENOUS at 09:06

## 2024-06-07 RX ADMIN — DIAZEPAM 10 MG: 10 INJECTION, SOLUTION INTRAMUSCULAR; INTRAVENOUS at 03:06

## 2024-06-07 RX ADMIN — DIAZEPAM 5 MG: 10 INJECTION, SOLUTION INTRAMUSCULAR; INTRAVENOUS at 09:06

## 2024-06-07 RX ADMIN — ONDANSETRON 4 MG: 2 INJECTION INTRAMUSCULAR; INTRAVENOUS at 02:06

## 2024-06-07 NOTE — ASSESSMENT & PLAN NOTE
64F with longstanding EtOH use disorder, EtOH withdrawals and seizures (2017), depression and hepatic steatosis. Pt with history of heavy drinking for past 14 years and numerous rehab attempts. Previously started on naltrexone during last admission for withdrawal in 04/2024. Follows with Psychiatry outpatient. Last drink 30 min prior to presentation, reports drinking daily.    Plan:  - Due to history of previous withdrawal seizures, will start PO Valium 10mg QID and taper daily as follows and as tolerated by patient:  -Day 1 Start PO diazepam 10mg QID  -Day 2 Valium 10 mg TID  -Day 3 Valium 10 mg BID  -Day 4 Valium 5 mg BID  -Day 5 Valium 5 mg qd and then stop taper  - CIWA scale in place with symptom triggered ativan if CIWA > 8:  - Mild (CIWA 8-15): IV diazepam 5 mg q4h PRN  - Moderate (CIWA 16-25): IV diazepam 10 mg q2h for 3 doses  - Supplement Folate 1mg and Thiamine 500mg TID x3d  - Monitor for continued signs of withdrawal  - Daily CBC, CMP  - Boost TID added for nutrition  - Addiction psych consulted, appreciate assistance

## 2024-06-07 NOTE — ASSESSMENT & PLAN NOTE
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   Currently in remission on no treatment  -WBC 62K, likely 2/2 to acute process vs withdrawal vs infection

## 2024-06-07 NOTE — ASSESSMENT & PLAN NOTE
WBC 62 on presentation. Hx of leukocytosis in past, as high as 48 in 2023. 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 Current leukocytosis could be reactive in setting of alcohol use vs infection vs her history of CLL.    - Peripheral smear and pathologist interpretation ordered.  - Consider infectious workup

## 2024-06-07 NOTE — ASSESSMENT & PLAN NOTE
Chronic, uncontrolled. Latest blood pressure and vitals reviewed-     Temp:  [98.9 °F (37.2 °C)]   Pulse:  []   Resp:  [20-51]   BP: (133-178)/()   SpO2:  [92 %-95 %] .   Home meds for hypertension were reviewed and noted below.   Hypertension Medications               losartan (COZAAR) 25 MG tablet Take 25 mg by mouth once daily.    metoprolol succinate (TOPROL-XL) 50 MG 24 hr tablet Take 1 tablet (50 mg total) by mouth once daily.        -continued home meds  -likely related to Etoh withdrawal    While in the hospital, will manage blood pressure as follows; Continue home antihypertensive regimen    Will utilize p.r.n. blood pressure medication only if patient's blood pressure greater than 180/110 and she develops symptoms such as worsening chest pain or shortness of breath.

## 2024-06-07 NOTE — ED TRIAGE NOTES
Pt arrives via EMS from home c/o nausea and diarrhea x 3 days & wishing to detox from alcohol- last drink 30 min before arrival, pt usually drinks pint of vodka/day; hx lukemia not on chemo; pt received 4mg IV zofran with EMS

## 2024-06-07 NOTE — ED NOTES
Pt belongings include:   Orange dress  White patterned purse  Yellow shoes  Phone  Jewelry- 5 rings, watch, johnathan necklace   130

## 2024-06-07 NOTE — Clinical Note
Diagnosis: Hypoxia [142305]   Future Attending Provider: KOTA BAKER III [[id number]]   Reason for IP Medical Treatment  (Clinical interventions that can only be accomplished in the IP setting? ) :: Hypoxia, EOTH Wx   I certify that Inpatient services for greater than or equal to 2 midnights are medically necessary:: Yes   Plans for Post-Acute care--if anticipated (pick the single best option):: A. No post acute care anticipated at this time

## 2024-06-07 NOTE — ASSESSMENT & PLAN NOTE
"Patient's FSGs are controlled on current medication regimen.  Last A1c reviewed-   Lab Results   Component Value Date    HGBA1C 6.0 (H) 02/11/2024     Most recent fingerstick glucose reviewed- No results for input(s): "POCTGLUCOSE" in the last 24 hours.  Current correctional scale  Low  Maintain anti-hyperglycemic dose as follows-   Antihyperglycemics (From admission, onward)      None          Hold Oral hypoglycemics while patient is in the hospital.  "

## 2024-06-07 NOTE — PLAN OF CARE
Problem: Violence Risk or Actual  Goal: Anger and Impulse Control  Outcome: Progressing     Problem: Suicide Risk  Goal: Absence of Self-Harm  Outcome: Progressing     Problem: Adult Inpatient Plan of Care  Goal: Plan of Care Review  Outcome: Progressing  Goal: Patient-Specific Goal (Individualized)  Outcome: Progressing  Goal: Absence of Hospital-Acquired Illness or Injury  Outcome: Progressing  Goal: Optimal Comfort and Wellbeing  Outcome: Progressing  Goal: Readiness for Transition of Care  Outcome: Progressing     Problem: Diabetes Comorbidity  Goal: Blood Glucose Level Within Targeted Range  Outcome: Progressing

## 2024-06-07 NOTE — SUBJECTIVE & OBJECTIVE
Past Medical History:   Diagnosis Date    Alcoholism     c/b alcohol withdrawl seizures 7/2017    Anemia     Aortic atherosclerosis 04/17/2024    Cancer of breast 10/2020    s/p bilateral mastectomy for  T1b N0 stage IA breast cancer October 2020    CLL (chronic lymphocytic leukemia) 09/30/2022 6/22/22 - PB flow cytometry  Immunophenotyping of peripheral blood detects a distinct kappa light chain restricted monoclonal B-cell population  (calculated at 2.44x10 9 /L, from the most recent CBC showing a total WBC of  7.35 K/uL with 61% total lymphocytes)  with a CLL phenotype (coexpression of CD19, CD5, CD23 and dim CD20). CD22 (FITC), FMC-7 and CD38 are negative in this population.    Controlled type 2 diabetes mellitus without complication, without long-term current use of insulin 11/30/2021    COPD (chronic obstructive pulmonary disease)     Depression     Diverticulitis     Fatty liver     GERD (gastroesophageal reflux disease)     Hyperlipidemia     Hypertension     Pancreatitis     Peptic ulcer disease     Polysubstance abuse     Posterior reversible encephalopathy syndrome     Sarcoidosis of lung     over 30 yrs ago    Suicide attempt        Past Surgical History:   Procedure Laterality Date    APPENDECTOMY      BILATERAL MASTECTOMY Bilateral 10/29/2020    Procedure: MASTECTOMY, BILATERAL;  Surgeon: Baylee Kevin MD;  Location: [address];  Service: General;  Laterality: Bilateral;    BREAST REVISION SURGERY Bilateral 2/11/2021    Procedure: BREAST REVISION SURGERY;  Surgeon: Scottie Johnson MD;  Location: [address];  Service: Plastics;  Laterality: Bilateral;    COLONOSCOPY N/A 7/28/2017    Procedure: COLONOSCOPY;  Surgeon: Aaron Alvarado MD;  Location: [facility];  Service: Endoscopy;  Laterality: N/A;    ESOPHAGOGASTRODUODENOSCOPY  10/7/2016, 11/6/2014    2016 - gastritis, duodenitis, 2014 erosive gastritis    ESOPHAGOGASTRODUODENOSCOPY N/A 2/11/2020    Procedure:  ESOPHAGOGASTRODUODENOSCOPY (EGD);  Surgeon: Fawn Garrido MD;  Location: [facility] ENDO;  Service: Endoscopy;  Laterality: N/A;    ESOPHAGOGASTRODUODENOSCOPY N/A 4/19/2021    Procedure: EGD (ESOPHAGOGASTRODUODENOSCOPY);  Surgeon: Paramjit Martino MD;  Location: [facility] ENDO;  Service: Endoscopy;  Laterality: N/A;    FLEXIBLE SIGMOIDOSCOPY  11/06/2014    colitis    HYSTERECTOMY      IMPLANTATION OF PERMANENT SACRAL NERVE STIMULATOR N/A 7/12/2022    Procedure: INSERTION, NEUROSTIMULATOR, PERMANENT, SACRAL;  Surgeon: Juaquin Edwards MD;  Location: [facility] OR [address] [place] [address];  Service: Urology;  Laterality: N/A;  1hr    INJECTION FOR SENTINEL NODE IDENTIFICATION Right 10/29/2020    Procedure: INJECTION, FOR SENTINEL NODE IDENTIFICATION;  Surgeon: Baylee Kevin MD;  Location: [facility] OR [address] [place] [address];  Service: General;  Laterality: Right;    INJECTION OF JOINT Right 10/10/2019    Procedure: Injection, Joint RIGHT ILIOPSOAS BURSA/TENDON INJECTION AND RIGHT GLUTEAL TENDON INJECTION WITH STEROID AND LIDOCAINE;  Surgeon: Guillaume Rico MD;  Location: [facility] PAIN MGT;  Service: Pain Management;  Laterality: Right;  NEEDS CONSENT    INSERTION OF BREAST TISSUE EXPANDER Bilateral 10/29/2020    Procedure: INSERTION, TISSUE EXPANDER, BREAST;  Surgeon: Scottie Johnson MD;  Location: [address];  Service: Plastics;  Laterality: Bilateral;  Right breast: 1082 g  Left breast: 1076 g    LIPOSUCTION Bilateral 2/11/2021    Procedure: LIPOSUCTION;  Surgeon: Scottie Johnson MD;  Location: [facility] OR [address] [place] [address];  Service: Plastics;  Laterality: Bilateral;    mediastenoscopy      REPLACEMENT OF IMPLANT OF BREAST Bilateral 2/11/2021    Procedure: REPLACEMENT, IMPLANT, BREAST;  Surgeon: Scottie Johnson MD;  Location: [facility] OR [address] [place] [address];  Service: Plastics;  Laterality: Bilateral;    SENTINEL LYMPH NODE BIOPSY Right 10/29/2020    Procedure: BIOPSY, LYMPH NODE, SENTINEL;  Surgeon: Baylee Kevin MD;  Location: [facility] OR [address] [place] [address];  Service:  General;  Laterality: Right;    TONSILLECTOMY N/A 1970    TUBAL LIGATION         Review of patient's allergies indicates:   Allergen Reactions    Lortab [hydrocodone-acetaminophen] Itching    Promethazine Itching and Other (See Comments)    Albuterol      Other Reaction(s): CONFUSION       Current Facility-Administered Medications on File Prior to Encounter   Medication    albuterol sulfate nebulizer solution 2.5 mg     Current Outpatient Medications on File Prior to Encounter   Medication Sig    apixaban (ELIQUIS) 5 mg Tab Take 1 tablet (5 mg total) by mouth 2 (two) times daily.    buPROPion (WELLBUTRIN SR) 200 MG SR12 Take 200 mg by mouth once daily.    EScitalopram oxalate (LEXAPRO) 20 MG tablet Take 1 tablet (20 mg total) by mouth once daily.    folic acid (FOLVITE) 1 MG tablet Take 1 tablet (1 mg total) by mouth once daily.    gabapentin (NEURONTIN) 300 MG capsule Take 1 capsule (300 mg total) by mouth 3 (three) times daily. (Patient taking differently: Take 400 mg by mouth 3 (three) times daily.)    ibuprofen (ADVIL,MOTRIN) 400 MG tablet Take 400 mg by mouth 3 (three) times daily.    lancets Misc Use to check blood glucose 4 times daily    levothyroxine (SYNTHROID) 75 MCG tablet Take 1 tablet (75 mcg total) by mouth before breakfast.    losartan (COZAAR) 25 MG tablet Take 25 mg by mouth once daily.    metFORMIN (GLUCOPHAGE-XR) 500 MG ER 24hr tablet Take 500 mg by mouth 2 (two) times daily with meals.    metoprolol succinate (TOPROL-XL) 50 MG 24 hr tablet Take 1 tablet (50 mg total) by mouth once daily.    multivitamin Tab Take 1 tablet by mouth once daily.    naltrexone (DEPADE) 50 mg tablet Take 1 tablet (50 mg total) by mouth once daily.    OLANZapine (ZYPREXA) 7.5 MG tablet Take 7.5 mg by mouth every evening.    omeprazole (PRILOSEC) 20 MG capsule Take 1 capsule (20 mg total) by mouth once daily.    ondansetron (ZOFRAN) 4 MG tablet Take 4 mg by mouth daily as needed for Nausea.    predniSONE (DELTASONE) 20  MG tablet Take 20 mg by mouth once daily.    traZODone (DESYREL) 100 MG tablet Take 2 tablets (200 mg total) by mouth every evening.    [DISCONTINUED] omeprazole (PRILOSEC OTC) 20 MG tablet Take 20 mg by mouth once daily.     Family History       Problem Relation (Age of Onset)    Breast cancer Maternal Aunt, Daughter    Colon cancer Maternal Uncle    Diabetes Father, Mother    Heart attack Father    Hypertension Father, Mother          Tobacco Use    Smoking status: Every Day     Current packs/day: 0.00     Average packs/day: 0.5 packs/day for 30.0 years (15.0 ttl pk-yrs)     Types: Vaping with nicotine, Cigarettes     Start date: 2/1/1991     Last attempt to quit: 2/1/2021     Years since quitting: 3.3    Smokeless tobacco: Never    Tobacco comments:     Patient is currently smoking 10 cigarettes a day, declines nicotine patches   Substance and Sexual Activity    Alcohol use: Yes     Comment: vodka daily (half a regular bottle) for 4 days    Drug use: Yes     Types: Marijuana     Comment: gummies    Sexual activity: Yes     Birth control/protection: Surgical     Review of Systems  Objective:     Vital Signs (Most Recent):  Temp: 98.9 °F (37.2 °C) (06/07/24 0728)  Pulse: 105 (06/07/24 1108)  Resp: 20 (06/07/24 1108)  BP: (!) 178/77 (06/07/24 1108)  SpO2: 95 % (06/07/24 1108) Vital Signs (24h Range):  Temp:  [98.9 °F (37.2 °C)] 98.9 °F (37.2 °C)  Pulse:  [] 105  Resp:  [20-51] 20  SpO2:  [92 %-95 %] 95 %  BP: (133-178)/() 178/77     Weight: 77.1 kg (170 lb)  Body mass index is 27.44 kg/m².     Physical Exam  Constitutional:       Appearance: She is ill-appearing.      Comments: Generalized tremors   HENT:      Head: Normocephalic.      Mouth/Throat:      Mouth: Mucous membranes are dry.   Eyes:      Extraocular Movements: Extraocular movements intact.   Cardiovascular:      Rate and Rhythm: Regular rhythm. Tachycardia present.      Heart sounds: No murmur heard.  Pulmonary:      Effort: No respiratory  distress.      Breath sounds: No wheezing.   Abdominal:      General: Abdomen is flat. There is no distension.      Tenderness: There is no abdominal tenderness.   Musculoskeletal:         General: No swelling or tenderness.      Cervical back: Normal range of motion.   Skin:     Coloration: Skin is not jaundiced.   Neurological:      Mental Status: She is oriented to person, place, and time.   Psychiatric:      Comments: Denies SI or thoughts                Significant Labs: All pertinent labs within the past 24 hours have been reviewed.  CBC:   Recent Labs   Lab 06/07/24  0807   WBC 62.08*   HGB 9.9*   HCT 34.3*   *     CMP:   Recent Labs   Lab 06/07/24  0807      K 4.2   CL 98   CO2 20*   GLU 61*   BUN 16   CREATININE 0.9   CALCIUM 8.6*   PROT 7.2   ALBUMIN 4.3   BILITOT 0.6   ALKPHOS 63   AST 47*   ALT 18   ANIONGAP 20*       Significant Imaging: I have reviewed all pertinent imaging results/findings within the past 24 hours.

## 2024-06-07 NOTE — PROVIDER PROGRESS NOTES - EMERGENCY DEPT.
EKG interpretation by ED attending physician:  Sinus tach, rate 101, rare PVC, no ST changes, no ischemia, normal intervals.  Compared with prior EKG dated 04/2024, rate has increased, PVCs are new.

## 2024-06-07 NOTE — ED PROVIDER NOTES
Encounter Date: 6/7/2024       History     Chief Complaint   Patient presents with    Nausea     Nausea and diarrhea. Last drink x30 minutes ago. Typically drinks 1 pint of vodka/day.     HPI    64-year-old female significant medical history of polysubstance abuse, hypertension, COPD, CLL, breast cancer, EtOH abuse, GERD, fibromyalgia, E coli bacteremia presenting for nausea and EtOH.      Patient endorses she is attempting to detox, last had a drink 30 minutes prior to arrival.  She drinks 1 bottle of liquor per day.  She endorses multiple episodes of nonbloody nonbilious emesis, generalized mild abdominal discomfort.  She was interested in rehab and wants to get clean from alcohol.  She denies chest pain, shortness of breath, sore throat, fever, chills, polyuria, dysuria, polysubstance abuse.    She endorses passive suicidality without a plan or intent, denies homicidal ideation, denies AVH.    For EMS, patient was hypoxic in the mid 80s on room air and was started on 2 L via nasal cannula.  EMS completed an EKG which showed no QTC prolongation, she was given 4 mg IV Zofran by EMS without improvement of her nausea.  Patient denies diarrhea, however there was evidence of dried stool on her legs and hands.  She was alert and oriented to person and place but not time.    Chart review, patient was recently admitted 04/11/2024 through 04/13/2024 for acute alcohol withdrawal.  Pec was lifted, she was voluntarily admitted to rehab unit in [place].    Review of patient's allergies indicates:   Allergen Reactions    Lortab [hydrocodone-acetaminophen] Itching    Promethazine Itching and Other (See Comments)    Albuterol      Other Reaction(s): CONFUSION     Past Medical History:   Diagnosis Date    Alcoholism     c/b alcohol withdrawl seizures 7/2017    Anemia     Aortic atherosclerosis 04/17/2024    Cancer of breast 10/2020    s/p bilateral mastectomy for  T1b N0 stage IA breast cancer October 2020    CLL (chronic  lymphocytic leukemia) 09/30/2022 6/22/22 - PB flow cytometry  Immunophenotyping of peripheral blood detects a distinct kappa light chain restricted monoclonal B-cell population  (calculated at 2.44x10 9 /L, from the most recent CBC showing a total WBC of  7.35 K/uL with 61% total lymphocytes)  with a CLL phenotype (coexpression of CD19, CD5, CD23 and dim CD20). CD22 (FITC), FMC-7 and CD38 are negative in this population.    Controlled type 2 diabetes mellitus without complication, without long-term current use of insulin 11/30/2021    COPD (chronic obstructive pulmonary disease)     Depression     Diverticulitis     Fatty liver     GERD (gastroesophageal reflux disease)     Hyperlipidemia     Hypertension     Pancreatitis     Peptic ulcer disease     Polysubstance abuse     Posterior reversible encephalopathy syndrome     Sarcoidosis of lung     over 30 yrs ago    Suicide attempt      Past Surgical History:   Procedure Laterality Date    APPENDECTOMY      BILATERAL MASTECTOMY Bilateral 10/29/2020    Procedure: MASTECTOMY, BILATERAL;  Surgeon: Baylee Kevin MD;  Location: [facility] OR [address] [place] [address];  Service: General;  Laterality: Bilateral;    BREAST REVISION SURGERY Bilateral 2/11/2021    Procedure: BREAST REVISION SURGERY;  Surgeon: Scottie Johnson MD;  Location: [facility] OR [address] [place] [address];  Service: Plastics;  Laterality: Bilateral;    COLONOSCOPY N/A 7/28/2017    Procedure: COLONOSCOPY;  Surgeon: Aaron Alvarado MD;  Location: [facility];  Service: Endoscopy;  Laterality: N/A;    ESOPHAGOGASTRODUODENOSCOPY  10/7/2016, 11/6/2014    2016 - gastritis, duodenitis, 2014 erosive gastritis    ESOPHAGOGASTRODUODENOSCOPY N/A 2/11/2020    Procedure: ESOPHAGOGASTRODUODENOSCOPY (EGD);  Surgeon: Fawn Garrido MD;  Location: [facility];  Service: Endoscopy;  Laterality: N/A;    ESOPHAGOGASTRODUODENOSCOPY N/A 4/19/2021    Procedure: EGD (ESOPHAGOGASTRODUODENOSCOPY);  Surgeon: Paramjit Martino MD;  Location: [facility];  Service:  Endoscopy;  Laterality: N/A;    FLEXIBLE SIGMOIDOSCOPY  11/06/2014    colitis    HYSTERECTOMY      IMPLANTATION OF PERMANENT SACRAL NERVE STIMULATOR N/A 7/12/2022    Procedure: INSERTION, NEUROSTIMULATOR, PERMANENT, SACRAL;  Surgeon: Juaquin Edwards MD;  Location: [address];  Service: Urology;  Laterality: N/A;  1hr    INJECTION FOR SENTINEL NODE IDENTIFICATION Right 10/29/2020    Procedure: INJECTION, FOR SENTINEL NODE IDENTIFICATION;  Surgeon: Baylee Kevin MD;  Location: [address];  Service: General;  Laterality: Right;    INJECTION OF JOINT Right 10/10/2019    Procedure: Injection, Joint RIGHT ILIOPSOAS BURSA/TENDON INJECTION AND RIGHT GLUTEAL TENDON INJECTION WITH STEROID AND LIDOCAINE;  Surgeon: Guillaume Rico MD;  Location: [facility]GT;  Service: Pain Management;  Laterality: Right;  NEEDS CONSENT    INSERTION OF BREAST TISSUE EXPANDER Bilateral 10/29/2020    Procedure: INSERTION, TISSUE EXPANDER, BREAST;  Surgeon: Scottie Johnson MD;  Location: [address];  Service: Plastics;  Laterality: Bilateral;  Right breast: 1082 g  Left breast: 1076 g    LIPOSUCTION Bilateral 2/11/2021    Procedure: LIPOSUCTION;  Surgeon: Scottie Johnson MD;  Location: [address];  Service: Plastics;  Laterality: Bilateral;    mediastenoscopy      REPLACEMENT OF IMPLANT OF BREAST Bilateral 2/11/2021    Procedure: REPLACEMENT, IMPLANT, BREAST;  Surgeon: Scottie Johnson MD;  Location: [address];  Service: Plastics;  Laterality: Bilateral;    SENTINEL LYMPH NODE BIOPSY Right 10/29/2020    Procedure: BIOPSY, LYMPH NODE, SENTINEL;  Surgeon: Baylee Kevin MD;  Location: [address];  Service: General;  Laterality: Right;    TONSILLECTOMY N/A 1970    TUBAL LIGATION       Family History   Problem Relation Name Age of Onset    Heart attack Father      Diabetes Father      Hypertension Father      Diabetes Mother      Hypertension Mother      Breast cancer Maternal Aunt      Colon  cancer Maternal Uncle      Breast cancer Daughter      Ovarian cancer Neg Hx      Cancer Neg Hx       Social History     Tobacco Use    Smoking status: Every Day     Current packs/day: 0.00     Average packs/day: 0.5 packs/day for 30.0 years (15.0 ttl pk-yrs)     Types: Vaping with nicotine, Cigarettes     Start date: 2/1/1991     Last attempt to quit: 2/1/2021     Years since quitting: 3.3    Smokeless tobacco: Never    Tobacco comments:     Patient is currently smoking 10 cigarettes a day, declines nicotine patches   Substance Use Topics    Alcohol use: Yes     Comment: vodka daily (half a regular bottle) for 4 days    Drug use: Yes     Types: Marijuana     Comment: gummies     Review of Systems  See HPI for pertinent ROS.   Physical Exam     Initial Vitals [06/07/24 0728]   BP Pulse Resp Temp SpO2   133/62 92 20 98.9 °F (37.2 °C) (!) 92 %      MAP       --         Physical Exam    Constitutional: She appears well-developed and well-nourished.   Altered, Aox2 person and place    HENT:   Head: Normocephalic and atraumatic.   Eyes: Conjunctivae are normal. Pupils are equal, round, and reactive to light. No scleral icterus.   Neck: No JVD present.   Cardiovascular:  Normal rate, regular rhythm and normal heart sounds.     Exam reveals no gallop and no friction rub.       No murmur heard.  Pulmonary/Chest: Breath sounds normal. No stridor. She has no wheezes. She has no rhonchi. She has no rales.   Abdominal: Abdomen is soft. There is no abdominal tenderness.   Dried stool down legs and on hands  There is no rebound and no guarding.   Musculoskeletal:         General: No tenderness or edema.     Neurological:   Disoriented, GCS 14   Skin: Skin is warm. Capillary refill takes less than 2 seconds.   Psychiatric:   Clinically intoxicated, slurring words, altered         ED Course   Procedures  Labs Reviewed   CBC W/ AUTO DIFFERENTIAL - Abnormal; Notable for the following components:       Result Value    WBC 62.08 (*)      RBC 3.84 (*)     Hemoglobin 9.9 (*)     Hematocrit 34.3 (*)     MCH 25.8 (*)     MCHC 28.9 (*)     RDW 20.4 (*)     Platelets 144 (*)     Gran % 8.5 (*)     Lymph % 90.5 (*)     Mono % 1.0 (*)     Platelet Estimate Decreased (*)     All other components within normal limits    Narrative:     WBC   critical result(s) called and verbal readback obtained from Dr Grant Gudino by JONAS 06/07/2024 10:13   COMPREHENSIVE METABOLIC PANEL - Abnormal; Notable for the following components:    CO2 20 (*)     Glucose 61 (*)     Calcium 8.6 (*)     AST 47 (*)     Anion Gap 20 (*)     All other components within normal limits   ALCOHOL,MEDICAL (ETHANOL) - Abnormal; Notable for the following components:    Alcohol, Serum 242 (*)     All other components within normal limits   TSH   ACETAMINOPHEN LEVEL   MAGNESIUM   LIPASE   URINALYSIS, REFLEX TO URINE CULTURE   DRUG SCREEN PANEL, URINE EMERGENCY   POCT GLUCOSE MONITORING CONTINUOUS        ECG Results              EKG 12-lead (Final result)        Collection Time Result Time QRS Duration OHS QTC Calculation    06/07/24 08:12:15 06/07/24 10:26:45 96 443                     Final result by Interface, Lab In [facility] (06/07/24 10:26:49)                   Narrative:    Test Reason : R11.0,    Vent. Rate : 101 BPM     Atrial Rate : 101 BPM     P-R Int : 150 ms          QRS Dur : 096 ms      QT Int : 342 ms       P-R-T Axes : 080 069 049 degrees     QTc Int : 443 ms    Sinus tachycardia with occasional Premature ventricular complexes  Otherwise normal ECG  When compared with ECG of 13-APR-2024 21:24,  Premature ventricular complexes are now Present  Confirmed by Sg STEPHENSON MD ([id number]) on 6/7/2024 10:26:43 AM    Referred By: [email or web address]   SELF           Confirmed By:Sg STEPHENSON MD                                  Imaging Results              X-Ray Chest 1 View (Final result)  Result time 06/07/24 09:09:33      Final result by Luis Holder MD (06/07/24 09:09:33)                   Impression:       See above      Electronically signed by: Luis Holder MD  Date:    06/07/2024  Time:    09:09               Narrative:    EXAMINATION:  XR CHEST 1 VIEW    CLINICAL HISTORY:  Hypoxemia    TECHNIQUE:  Single frontal view of the chest was performed.    COMPARISON:  No 04/07/2024 ne    FINDINGS:  Heart size normal.  Slight opacification of the right upper chest probably related to overlying soft tissues.  Heart size normal.  Mild opacification at the left lung base probably volume loss.  No significant airspace consolidation or pleural effusion.                                       Medications   diazePAM injection 5 mg (5 mg Intravenous Given 6/7/24 0904)   multivitamin tablet (has no administration in time range)   droPERidol injection 0.625 mg (0.625 mg Intravenous Given 6/7/24 0758)   lactated ringers bolus 1,000 mL (0 mLs Intravenous Stopped 6/7/24 1053)   famotidine (PF) injection 20 mg (20 mg Intravenous Given 6/7/24 0759)   diphenhydrAMINE injection 25 mg (25 mg Intravenous Given 6/7/24 0820)   folic acid tablet 1 mg (1 mg Oral Given 6/7/24 1209)   thiamine tablet 100 mg (100 mg Oral Given 6/7/24 1209)   diazePAM injection 5 mg (5 mg Intravenous Given 6/7/24 1119)     Medical Decision Making  Amount and/or Complexity of Data Reviewed  Labs: ordered.  Radiology: ordered.    Risk  OTC drugs.  Prescription drug management.  Decision regarding hospitalization.               ED Course as of 06/07/24 1210   Fri Jun 07, 2024   1207 X-Ray Chest 1 View  On my personal interpretation of this CXR, there is no evidence of acute pneumonia, pneumothorax, pleural effusion, or pulmonary edema.    [KB]   1208 Comprehensive metabolic panel(!)  No DEVANTE, isolated AST elevation, no ALT or bilirubin elevation, no evidence of hepatobiliary obstruction. [KB]   1209 CBC auto differential(!!)  Significant leukocytosis, consistent with CLL, however patient not currently under treatment.  Anemia near baseline, not at transfusion  threshold, and mild thrombocytopenia. [KB]   1209 Magnesium [KB]   1209 Lipase  Pancreatitis less likely [KB]   1209 TSH  Myxedema,/thyrotoxicosis less likely. [KB]      ED Course User Index  [KB] Maxine Garcia MD                       EKG, sinus tachycardia, rate 101, normal axis deviation, no QTC prolongation or ST segment elevation.    64-year-old female described as above presenting for nausea, vomiting in alcohol intoxication.  On arrival, patient mildly hypoxic at 92%, which improved with 2 L via nasal cannula, lungs are clear to auscultation, no evidence of wheezing or acute COPD exacerbation.  She was altered alert and oriented to person and place but not time, and exam consistent with clinical intoxication.  Code watch initiated given patient's passive suicidality endorsed while intoxicated.  See ED course for personal interpretation of results.  She was given IV droperidol for nausea, IV Pepcid and LR for dehydration, GI losses and abdominal discomfort.  Via leukocytosis, uptrending over the last month.  She was not currently on active treatment for her CLL.  Macrocytic anemia near baseline, not at transfusion threshold.  Mild thrombocytopenia also similar to previous.  Serum ethanol 242 consistent with acute intoxication.  She required repeat dosing of IV V that diazepam, given further agitation, patient was given repeat dosing with 2.5 mg IV droperidol and 25 mg IV Benadryl.  Discussed patient with Hospital Medicine who accepted patient in stable condition for further management.      Clinical Impression:  Final diagnoses:  [R09.02] Hypoxia  [R11.0] Nausea  [F10.930] Alcohol withdrawal syndrome without complication (Primary)  [R19.7] Diarrhea, unspecified type  [R45.851] Suicidal ideation          ED Disposition Condition    Admit Stable                Maxine Garcia MD  Resident  06/07/24 1211

## 2024-06-07 NOTE — HPI
Earl Abdul is a 64 year old woman with history of COPD, HTN, HLD, subclinical hypothyroidism, chronic lymphocytic leukemia, T2DM, Afib with RVR, fibromyalgia, and fatty liver disease, who presents with alcohol intoxication and nausea. Patient reports that she wants to detox, and reports that her last drink was 30 minutes prior to presentation. She drinks 1 bottle of liquor per day. She endorses multiple episodes of nonbloody nonbilious emesis, generalized mild abdominal discomfort. She expresses interest in going to rehab and wants to get clean from alcohol. She denies chest pain, shortness of breath, sore throat, fever, chills, polyuria, dysuria, polysubstance abuse. She endorses passive suicidality without a plan or intent, denies homicidal ideation, denies AVH. Previously admitted for EtOH withdrawal in April 2024, seen by Psychiatry and was started on naltrexone 50mg daily and lexapro was increased to 20mg daily. After detox, she went to residential rehab and completed 30 day program. Last seen in clinic by Psych on 5/20 where she was restarted on naltrexone 50 and continued on her prior psych medications.    In the ED, afebrile, tachycardic HR 100s, and hypertensive -170s. Found to be hypoxemic and placed on NC. Labs notable for WBC 62, Hgb 9.9, serum EtOH 242. Due to endorsed passive SI, PEC ordered. Received droperidol and zofran (from EMS) for nausea, and in the ED was given 1L IVF, 25mg benadryl, and 2 doses of diazepam based on CIWA scale. Patient admitted to hospital medicine for prevention and management of alcohol withdrawal.

## 2024-06-07 NOTE — AI DETERIORATION ALERT
"RAPID RESPONSE NURSE AI ALERT       AI alert received.    Chart Reviewed: 06/07/2024, 1:22 PM    MRN: [id number]  Bed: ED 18/18    Dx: <principal problem not specified>    Earl Abdul has a past medical history of Alcoholism, Anemia, Aortic atherosclerosis, Cancer of breast, CLL (chronic lymphocytic leukemia), Controlled type 2 diabetes mellitus without complication, without long-term current use of insulin, COPD (chronic obstructive pulmonary disease), Depression, Diverticulitis, Fatty liver, GERD (gastroesophageal reflux disease), Hyperlipidemia, Hypertension, Pancreatitis, Peptic ulcer disease, Polysubstance abuse, Posterior reversible encephalopathy syndrome, Sarcoidosis of lung, and Suicide attempt.    Last VS: BP (!) 178/77 (BP Location: Left arm, Patient Position: Lying)   Pulse 105   Temp 98.9 °F (37.2 °C) (Temporal)   Resp 20   Ht 5' 6" (1.676 m)   Wt 77.1 kg (170 lb)   SpO2 95%   BMI 27.44 kg/m²     24H Vital Sign Range:  Temp:  [98.9 °F (37.2 °C)]   Pulse:  []   Resp:  [20-51]   BP: (133-178)/()   SpO2:  [92 %-95 %]     Level of Consciousness (AVPU): alert    Recent Labs     06/07/24  0807   WBC 62.08*   HGB 9.9*   HCT 34.3*   *       Recent Labs     06/07/24  0807      K 4.2   CL 98   CO2 20*   BUN 16   CREATININE 0.9   GLU 61*   MG 1.9       OXYGEN:  Flow (L/min) (Oxygen Therapy): 1.5        MEWS score: 2    Bedside RNHector notified of alert. Discussed tachycardia, HTN, and tachypnea. PRN diazepam ordered for CIWA > 8 per primary team. Telemetry monitoring and serial blood glucose checks ordered.  No additional concerns verbalized at this time. Instructed to call [phone] for further concerns or assistance.    Luba Farmer RN        "

## 2024-06-07 NOTE — ED NOTES
Telemetry Verification   Patient placed on Telemetry Box  Verified with War Room  Box # 0001   Monitor Tech Yuri   Rate 99   Rhythm NS

## 2024-06-07 NOTE — NURSING
Nurses Note -- 4 Eyes      6/7/2024   5:14 PM      Skin assessed during: Admit      [x] No Altered Skin Integrity Present    []Prevention Measures Documented      [] Yes- Altered Skin Integrity Present or Discovered   [] LDA Added if Not in Epic (Describe Wound)   [] New Altered Skin Integrity was Present on Admit and Documented in LDA   [] Wound Image Taken    Wound Care Consulted? No    Attending Nurse:  Felicita Shepherd RN/Staff Member:   CLAUDIA Winter

## 2024-06-07 NOTE — H&P
[facility]  Hospital Medicine  History & Physical    Patient Name: Earl Abdul  MRN: [id number]  Patient Class: IP- Inpatient  Admission Date: 6/7/2024  Attending Physician: Carlos Alanis III*   Primary Care Provider: Andrew Rodriguez MD         Patient information was obtained from patient and ER records.     Subjective:     Principal Problem:Alcohol use disorder, severe, dependence    Chief Complaint:   Chief Complaint   Patient presents with    Nausea     Nausea and diarrhea. Last drink x30 minutes ago. Typically drinks 1 pint of vodka/day.        HPI: Earl Abdul is a 64 year old woman with history of COPD, HTN, HLD, subclinical hypothyroidism, chronic lymphocytic leukemia, T2DM, Afib with RVR, fibromyalgia, and fatty liver disease, who presents with alcohol intoxication and nausea. Patient reports that she wants to detox, and reports that her last drink was 30 minutes prior to presentation. She drinks 1 bottle of liquor per day. She endorses multiple episodes of nonbloody nonbilious emesis, generalized mild abdominal discomfort. She expresses interest in going to rehab and wants to get clean from alcohol. She denies chest pain, shortness of breath, sore throat, fever, chills, polyuria, dysuria, polysubstance abuse. She endorses passive suicidality without a plan or intent, denies homicidal ideation, denies AVH. Previously admitted for EtOH withdrawal in April 2024, seen by Psychiatry and was started on naltrexone 50mg daily and lexapro was increased to 20mg daily. After detox, she went to residential rehab and completed 30 day program. Last seen in clinic by Psych on 5/20 where she was restarted on naltrexone 50 and continued on her prior psych medications.    In the ED, afebrile, tachycardic HR 100s, and hypertensive -170s. Found to be hypoxemic and placed on NC. Labs notable for WBC 62, Hgb 9.9, serum EtOH 242. Due to endorsed passive SI, PEC ordered. Received  droperidol and zofran (from EMS) for nausea, and in the ED was given 1L IVF, 25mg benadryl, and 2 doses of diazepam based on CIWA scale. Patient admitted to hospital medicine for prevention and management of alcohol withdrawal.    Past Medical History:   Diagnosis Date    Alcoholism     c/b alcohol withdrawl seizures 7/2017    Anemia     Aortic atherosclerosis 04/17/2024    Cancer of breast 10/2020    s/p bilateral mastectomy for  T1b N0 stage IA breast cancer October 2020    CLL (chronic lymphocytic leukemia) 09/30/2022 6/22/22 - PB flow cytometry  Immunophenotyping of peripheral blood detects a distinct kappa light chain restricted monoclonal B-cell population  (calculated at 2.44x10 9 /L, from the most recent CBC showing a total WBC of  7.35 K/uL with 61% total lymphocytes)  with a CLL phenotype (coexpression of CD19, CD5, CD23 and dim CD20). CD22 (FITC), FMC-7 and CD38 are negative in this population.    Controlled type 2 diabetes mellitus without complication, without long-term current use of insulin 11/30/2021    COPD (chronic obstructive pulmonary disease)     Depression     Diverticulitis     Fatty liver     GERD (gastroesophageal reflux disease)     Hyperlipidemia     Hypertension     Pancreatitis     Peptic ulcer disease     Polysubstance abuse     Posterior reversible encephalopathy syndrome     Sarcoidosis of lung     over 30 yrs ago    Suicide attempt        Past Surgical History:   Procedure Laterality Date    APPENDECTOMY      BILATERAL MASTECTOMY Bilateral 10/29/2020    Procedure: MASTECTOMY, BILATERAL;  Surgeon: Baylee Kevin MD;  Location: [address];  Service: General;  Laterality: Bilateral;    BREAST REVISION SURGERY Bilateral 2/11/2021    Procedure: BREAST REVISION SURGERY;  Surgeon: Scottie Johnson MD;  Location: [address];  Service: Plastics;  Laterality: Bilateral;    COLONOSCOPY N/A 7/28/2017    Procedure: COLONOSCOPY;  Surgeon: Aaron Alvarado MD;  Location: [facility]  ENDO;  Service: Endoscopy;  Laterality: N/A;    ESOPHAGOGASTRODUODENOSCOPY  10/7/2016, 11/6/2014    2016 - gastritis, duodenitis, 2014 erosive gastritis    ESOPHAGOGASTRODUODENOSCOPY N/A 2/11/2020    Procedure: ESOPHAGOGASTRODUODENOSCOPY (EGD);  Surgeon: Fawn Garrido MD;  Location: [facility] ENDO;  Service: Endoscopy;  Laterality: N/A;    ESOPHAGOGASTRODUODENOSCOPY N/A 4/19/2021    Procedure: EGD (ESOPHAGOGASTRODUODENOSCOPY);  Surgeon: Paramjit Martino MD;  Location: [facility] ENDO;  Service: Endoscopy;  Laterality: N/A;    FLEXIBLE SIGMOIDOSCOPY  11/06/2014    colitis    HYSTERECTOMY      IMPLANTATION OF PERMANENT SACRAL NERVE STIMULATOR N/A 7/12/2022    Procedure: INSERTION, NEUROSTIMULATOR, PERMANENT, SACRAL;  Surgeon: Juaquin Edwards MD;  Location: [facility] OR [address] [place] [address];  Service: Urology;  Laterality: N/A;  1hr    INJECTION FOR SENTINEL NODE IDENTIFICATION Right 10/29/2020    Procedure: INJECTION, FOR SENTINEL NODE IDENTIFICATION;  Surgeon: Baylee Kevin MD;  Location: [address];  Service: General;  Laterality: Right;    INJECTION OF JOINT Right 10/10/2019    Procedure: Injection, Joint RIGHT ILIOPSOAS BURSA/TENDON INJECTION AND RIGHT GLUTEAL TENDON INJECTION WITH STEROID AND LIDOCAINE;  Surgeon: Guillaume Rico MD;  [facility];  Service: Pain Management;  Laterality: Right;  NEEDS CONSENT    INSERTION OF BREAST TISSUE EXPANDER Bilateral 10/29/2020    Procedure: INSERTION, TISSUE EXPANDER, BREAST;  Surgeon: Scottie Johnson MD;  Location: [address];  Service: Plastics;  Laterality: Bilateral;  Right breast: 1082 g  Left breast: 1076 g    LIPOSUCTION Bilateral 2/11/2021    Procedure: LIPOSUCTION;  Surgeon: Scottie Johnson MD;  Location: [address];  Service: Plastics;  Laterality: Bilateral;    mediastenoscopy      REPLACEMENT OF IMPLANT OF BREAST Bilateral 2/11/2021    Procedure: REPLACEMENT, IMPLANT, BREAST;  Surgeon: Scottie Johnson MD;  Location: [address]  [facility];  Service: Plastics;  Laterality: Bilateral;    SENTINEL LYMPH NODE BIOPSY Right 10/29/2020    Procedure: BIOPSY, LYMPH NODE, SENTINEL;  Surgeon: Baylee Kevin MD;  [facility];  Service: General;  Laterality: Right;    TONSILLECTOMY N/A 1970    TUBAL LIGATION         Review of patient's allergies indicates:   Allergen Reactions    Lortab [hydrocodone-acetaminophen] Itching    Promethazine Itching and Other (See Comments)    Albuterol      Other Reaction(s): CONFUSION       Current Facility-Administered Medications on File Prior to Encounter   Medication    albuterol sulfate nebulizer solution 2.5 mg     Current Outpatient Medications on File Prior to Encounter   Medication Sig    apixaban (ELIQUIS) 5 mg Tab Take 1 tablet (5 mg total) by mouth 2 (two) times daily.    buPROPion (WELLBUTRIN SR) 200 MG SR12 Take 200 mg by mouth once daily.    EScitalopram oxalate (LEXAPRO) 20 MG tablet Take 1 tablet (20 mg total) by mouth once daily.    folic acid (FOLVITE) 1 MG tablet Take 1 tablet (1 mg total) by mouth once daily.    gabapentin (NEURONTIN) 300 MG capsule Take 1 capsule (300 mg total) by mouth 3 (three) times daily. (Patient taking differently: Take 400 mg by mouth 3 (three) times daily.)    ibuprofen (ADVIL,MOTRIN) 400 MG tablet Take 400 mg by mouth 3 (three) times daily.    lancets Misc Use to check blood glucose 4 times daily    levothyroxine (SYNTHROID) 75 MCG tablet Take 1 tablet (75 mcg total) by mouth before breakfast.    losartan (COZAAR) 25 MG tablet Take 25 mg by mouth once daily.    metFORMIN (GLUCOPHAGE-XR) 500 MG ER 24hr tablet Take 500 mg by mouth 2 (two) times daily with meals.    metoprolol succinate (TOPROL-XL) 50 MG 24 hr tablet Take 1 tablet (50 mg total) by mouth once daily.    multivitamin Tab Take 1 tablet by mouth once daily.    naltrexone (DEPADE) 50 mg tablet Take 1 tablet (50 mg total) by mouth once daily.    OLANZapine (ZYPREXA) 7.5 MG tablet Take 7.5 mg by mouth every  evening.    omeprazole (PRILOSEC) 20 MG capsule Take 1 capsule (20 mg total) by mouth once daily.    ondansetron (ZOFRAN) 4 MG tablet Take 4 mg by mouth daily as needed for Nausea.    predniSONE (DELTASONE) 20 MG tablet Take 20 mg by mouth once daily.    traZODone (DESYREL) 100 MG tablet Take 2 tablets (200 mg total) by mouth every evening.    [DISCONTINUED] omeprazole (PRILOSEC OTC) 20 MG tablet Take 20 mg by mouth once daily.     Family History       Problem Relation (Age of Onset)    Breast cancer Maternal Aunt, Daughter    Colon cancer Maternal Uncle    Diabetes Father, Mother    Heart attack Father    Hypertension Father, Mother          Tobacco Use    Smoking status: Every Day     Current packs/day: 0.00     Average packs/day: 0.5 packs/day for 30.0 years (15.0 ttl pk-yrs)     Types: Vaping with nicotine, Cigarettes     Start date: 2/1/1991     Last attempt to quit: 2/1/2021     Years since quitting: 3.3    Smokeless tobacco: Never    Tobacco comments:     Patient is currently smoking 10 cigarettes a day, declines nicotine patches   Substance and Sexual Activity    Alcohol use: Yes     Comment: vodka daily (half a regular bottle) for 4 days    Drug use: Yes     Types: Marijuana     Comment: gummies    Sexual activity: Yes     Birth control/protection: Surgical     Review of Systems  Objective:     Vital Signs (Most Recent):  Temp: 98.9 °F (37.2 °C) (06/07/24 0728)  Pulse: 105 (06/07/24 1108)  Resp: 20 (06/07/24 1108)  BP: (!) 178/77 (06/07/24 1108)  SpO2: 95 % (06/07/24 1108) Vital Signs (24h Range):  Temp:  [98.9 °F (37.2 °C)] 98.9 °F (37.2 °C)  Pulse:  [] 105  Resp:  [20-51] 20  SpO2:  [92 %-95 %] 95 %  BP: (133-178)/() 178/77     Weight: 77.1 kg (170 lb)  Body mass index is 27.44 kg/m².     Physical Exam  Constitutional:       Appearance: She is ill-appearing.      Comments: Generalized tremors   HENT:      Head: Normocephalic.      Mouth/Throat:      Mouth: Mucous membranes are dry.   Eyes:       Extraocular Movements: Extraocular movements intact.   Cardiovascular:      Rate and Rhythm: Regular rhythm. Tachycardia present.      Heart sounds: No murmur heard.  Pulmonary:      Effort: No respiratory distress.      Breath sounds: No wheezing.   Abdominal:      General: Abdomen is flat. There is no distension.      Tenderness: There is no abdominal tenderness.   Musculoskeletal:         General: No swelling or tenderness.      Cervical back: Normal range of motion.   Skin:     Coloration: Skin is not jaundiced.   Neurological:      Mental Status: She is oriented to person, place, and time.   Psychiatric:      Comments: Denies SI or thoughts                Significant Labs: All pertinent labs within the past 24 hours have been reviewed.  CBC:   Recent Labs   Lab 06/07/24  0807   WBC 62.08*   HGB 9.9*   HCT 34.3*   *     CMP:   Recent Labs   Lab 06/07/24  0807      K 4.2   CL 98   CO2 20*   GLU 61*   BUN 16   CREATININE 0.9   CALCIUM 8.6*   PROT 7.2   ALBUMIN 4.3   BILITOT 0.6   ALKPHOS 63   AST 47*   ALT 18   ANIONGAP 20*       Significant Imaging: I have reviewed all pertinent imaging results/findings within the past 24 hours.  Assessment/Plan:     * Alcohol use disorder, severe, dependence  64F with longstanding EtOH use disorder, EtOH withdrawals and seizures (2017), depression and hepatic steatosis. Pt with history of heavy drinking for past 14 years and numerous rehab attempts. Previously started on naltrexone during last admission for withdrawal in 04/2024. Follows with Psychiatry outpatient. Last drink 30 min prior to presentation, reports drinking daily.    Plan:  - Due to history of previous withdrawal seizures, will start PO Valium 10mg QID and taper daily as follows and as tolerated by patient:  -Day 1 Start PO diazepam 10mg QID  -Day 2 Valium 10 mg TID  -Day 3 Valium 10 mg BID  -Day 4 Valium 5 mg BID  -Day 5 Valium 5 mg qd and then stop taper  - CIWA scale in place with symptom  "triggered ativan if CIWA > 8:  - Mild (CIWA 8-15): IV diazepam 5 mg q4h PRN  - Moderate (CIWA 16-25): IV diazepam 10 mg q2h for 3 doses  - Supplement Folate 1mg and Thiamine 500mg TID x3d  - Monitor for continued signs of withdrawal  - Daily CBC, CMP  - Boost TID added for nutrition  - Addiction psych consulted, appreciate assistance    Leukocytosis  WBC 62 on presentation. Hx of leukocytosis in past, as high as 48 in 2023. 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 Current leukocytosis could be reactive in setting of alcohol use vs infection vs her history of CLL.    - Peripheral smear and pathologist interpretation ordered.  - Consider infectious workup      Paroxysmal atrial fibrillation  History of pAF, on Eliquis 5 BID.    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   Currently in remission on no treatment  -WBC 62K, likely 2/2 to acute process vs withdrawal vs infection         Type 2 diabetes mellitus without complication, without long-term current use of insulin  Patient's FSGs are controlled on current medication regimen.  Last A1c reviewed-   Lab Results   Component Value Date    HGBA1C 6.0 (H) 02/11/2024     Most recent fingerstick glucose reviewed- No results for input(s): "POCTGLUCOSE" in the last 24 hours.  Current correctional scale  Low  Maintain anti-hyperglycemic dose as follows-   Antihyperglycemics (From admission, onward)      None          Hold Oral hypoglycemics while patient is in the hospital.    VINICIO (obstructive sleep apnea)  -CPAP at home      Depression with anxiety  -continued lexapro    Tobacco abuse  -long term chronic smoker  -consider nicotine patches      Essential hypertension  Chronic, uncontrolled. Latest blood pressure and vitals reviewed-     Temp:  [98.9 °F (37.2 °C)]   Pulse:  []   Resp:  [20-51]   BP: " (133-178)/()   SpO2:  [92 %-95 %] .   Home meds for hypertension were reviewed and noted below.   Hypertension Medications               losartan (COZAAR) 25 MG tablet Take 25 mg by mouth once daily.    metoprolol succinate (TOPROL-XL) 50 MG 24 hr tablet Take 1 tablet (50 mg total) by mouth once daily.        -continued home meds  -likely related to Etoh withdrawal    While in the hospital, will manage blood pressure as follows; Continue home antihypertensive regimen    Will utilize p.r.n. blood pressure medication only if patient's blood pressure greater than 180/110 and she develops symptoms such as worsening chest pain or shortness of breath.    Alcohol withdrawal  See alcohol use disorder.        VTE Risk Mitigation (From admission, onward)           Ordered     apixaban tablet 5 mg  2 times daily         06/07/24 1336     IP VTE HIGH RISK PATIENT  Once         06/07/24 1224     Place sequential compression device  Until discontinued         06/07/24 1224                              Juan Luis Allen MD  Department of Hospital Medicine  [facility] Dept

## 2024-06-08 LAB
ALBUMIN SERPL BCP-MCNC: 3.7 G/DL (ref 3.5–5.2)
ALP SERPL-CCNC: 57 U/L (ref 55–135)
ALT SERPL W/O P-5'-P-CCNC: 17 U/L (ref 10–44)
ANION GAP SERPL CALC-SCNC: 14 MMOL/L (ref 8–16)
AST SERPL-CCNC: 39 U/L (ref 10–40)
BASO STIPL BLD QL SMEAR: ABNORMAL
BASOPHILS # BLD AUTO: ABNORMAL K/UL (ref 0–0.2)
BASOPHILS NFR BLD: 0 % (ref 0–1.9)
BILIRUB SERPL-MCNC: 0.9 MG/DL (ref 0.1–1)
BUN SERPL-MCNC: 10 MG/DL (ref 8–23)
CALCIUM SERPL-MCNC: 8.8 MG/DL (ref 8.7–10.5)
CHLORIDE SERPL-SCNC: 101 MMOL/L (ref 95–110)
CO2 SERPL-SCNC: 23 MMOL/L (ref 23–29)
CREAT SERPL-MCNC: 0.9 MG/DL (ref 0.5–1.4)
DIFFERENTIAL METHOD BLD: ABNORMAL
EOSINOPHIL # BLD AUTO: ABNORMAL K/UL (ref 0–0.5)
EOSINOPHIL NFR BLD: 0 % (ref 0–8)
ERYTHROCYTE [DISTWIDTH] IN BLOOD BY AUTOMATED COUNT: 20.5 % (ref 11.5–14.5)
EST. GFR  (NO RACE VARIABLE): >60 ML/MIN/1.73 M^2
GLUCOSE SERPL-MCNC: 63 MG/DL (ref 70–110)
HCT VFR BLD AUTO: 29.1 % (ref 37–48.5)
HGB BLD-MCNC: 8.9 G/DL (ref 12–16)
HYPOCHROMIA BLD QL SMEAR: ABNORMAL
IMM GRANULOCYTES # BLD AUTO: ABNORMAL K/UL (ref 0–0.04)
IMM GRANULOCYTES NFR BLD AUTO: ABNORMAL % (ref 0–0.5)
LYMPHOCYTES # BLD AUTO: ABNORMAL K/UL (ref 1–4.8)
LYMPHOCYTES NFR BLD: 92 % (ref 18–48)
MAGNESIUM SERPL-MCNC: 1.7 MG/DL (ref 1.6–2.6)
MCH RBC QN AUTO: 27 PG (ref 27–31)
MCHC RBC AUTO-ENTMCNC: 30.6 G/DL (ref 32–36)
MCV RBC AUTO: 88 FL (ref 82–98)
MONOCYTES # BLD AUTO: ABNORMAL K/UL (ref 0.3–1)
MONOCYTES NFR BLD: 2 % (ref 4–15)
MYELOCYTES NFR BLD MANUAL: 1 %
NEUTROPHILS NFR BLD: 5 % (ref 38–73)
NRBC BLD-RTO: 0 /100 WBC
OHS QRS DURATION: 84 MS
OHS QTC CALCULATION: 423 MS
OVALOCYTES BLD QL SMEAR: ABNORMAL
PHOSPHATE SERPL-MCNC: 2.2 MG/DL (ref 2.7–4.5)
PLATELET # BLD AUTO: 97 K/UL (ref 150–450)
PLATELET BLD QL SMEAR: ABNORMAL
PMV BLD AUTO: 10.1 FL (ref 9.2–12.9)
POCT GLUCOSE: 113 MG/DL (ref 70–110)
POCT GLUCOSE: 121 MG/DL (ref 70–110)
POCT GLUCOSE: 88 MG/DL (ref 70–110)
POCT GLUCOSE: 99 MG/DL (ref 70–110)
POTASSIUM SERPL-SCNC: 3.9 MMOL/L (ref 3.5–5.1)
PROT SERPL-MCNC: 6.1 G/DL (ref 6–8.4)
RBC # BLD AUTO: 3.3 M/UL (ref 4–5.4)
SMUDGE CELLS BLD QL SMEAR: PRESENT
SODIUM SERPL-SCNC: 138 MMOL/L (ref 136–145)
SPHEROCYTES BLD QL SMEAR: ABNORMAL
STOMATOCYTES BLD QL SMEAR: PRESENT
WBC # BLD AUTO: 20.82 K/UL (ref 3.9–12.7)

## 2024-06-08 PROCEDURE — 25000003 PHARM REV CODE 250

## 2024-06-08 PROCEDURE — 90792 PSYCH DIAG EVAL W/MED SRVCS: CPT | Mod: ,,, | Performed by: PSYCHIATRY & NEUROLOGY

## 2024-06-08 PROCEDURE — 63600175 PHARM REV CODE 636 W HCPCS

## 2024-06-08 PROCEDURE — 51702 INSERT TEMP BLADDER CATH: CPT

## 2024-06-08 PROCEDURE — 51701 INSERT BLADDER CATHETER: CPT

## 2024-06-08 PROCEDURE — 36415 COLL VENOUS BLD VENIPUNCTURE: CPT

## 2024-06-08 PROCEDURE — 85007 BL SMEAR W/DIFF WBC COUNT: CPT

## 2024-06-08 PROCEDURE — 51798 US URINE CAPACITY MEASURE: CPT

## 2024-06-08 PROCEDURE — 21400001 HC TELEMETRY ROOM

## 2024-06-08 PROCEDURE — 84100 ASSAY OF PHOSPHORUS: CPT

## 2024-06-08 PROCEDURE — 80053 COMPREHEN METABOLIC PANEL: CPT

## 2024-06-08 PROCEDURE — 85027 COMPLETE CBC AUTOMATED: CPT

## 2024-06-08 PROCEDURE — 63600175 PHARM REV CODE 636 W HCPCS: Performed by: STUDENT IN AN ORGANIZED HEALTH CARE EDUCATION/TRAINING PROGRAM

## 2024-06-08 PROCEDURE — 83735 ASSAY OF MAGNESIUM: CPT

## 2024-06-08 RX ORDER — LOSARTAN POTASSIUM 50 MG/1
50 TABLET ORAL DAILY
Status: DISCONTINUED | OUTPATIENT
Start: 2024-06-08 | End: 2024-06-08

## 2024-06-08 RX ORDER — ACETAMINOPHEN 325 MG/1
650 TABLET ORAL EVERY 6 HOURS PRN
Status: DISCONTINUED | OUTPATIENT
Start: 2024-06-08 | End: 2024-06-09 | Stop reason: HOSPADM

## 2024-06-08 RX ORDER — TRAZODONE HYDROCHLORIDE 100 MG/1
200 TABLET ORAL NIGHTLY
Status: DISCONTINUED | OUTPATIENT
Start: 2024-06-08 | End: 2024-06-09 | Stop reason: HOSPADM

## 2024-06-08 RX ORDER — BUPROPION HYDROCHLORIDE 150 MG/1
150 TABLET ORAL DAILY
Status: DISCONTINUED | OUTPATIENT
Start: 2024-06-08 | End: 2024-06-08

## 2024-06-08 RX ORDER — LOSARTAN POTASSIUM 50 MG/1
100 TABLET ORAL DAILY
Status: DISCONTINUED | OUTPATIENT
Start: 2024-06-09 | End: 2024-06-09 | Stop reason: HOSPADM

## 2024-06-08 RX ORDER — LOSARTAN POTASSIUM 50 MG/1
50 TABLET ORAL DAILY
Status: COMPLETED | OUTPATIENT
Start: 2024-06-08 | End: 2024-06-08

## 2024-06-08 RX ORDER — MAGNESIUM SULFATE HEPTAHYDRATE 40 MG/ML
2 INJECTION, SOLUTION INTRAVENOUS ONCE
Status: COMPLETED | OUTPATIENT
Start: 2024-06-08 | End: 2024-06-08

## 2024-06-08 RX ORDER — DIAZEPAM 5 MG/1
10 TABLET ORAL 3 TIMES DAILY
Status: DISCONTINUED | OUTPATIENT
Start: 2024-06-08 | End: 2024-06-09 | Stop reason: HOSPADM

## 2024-06-08 RX ORDER — DEXTROSE, SODIUM CHLORIDE, SODIUM LACTATE, POTASSIUM CHLORIDE, AND CALCIUM CHLORIDE 5; .6; .31; .03; .02 G/100ML; G/100ML; G/100ML; G/100ML; G/100ML
INJECTION, SOLUTION INTRAVENOUS CONTINUOUS
Status: DISCONTINUED | OUTPATIENT
Start: 2024-06-08 | End: 2024-06-09

## 2024-06-08 RX ADMIN — LOSARTAN POTASSIUM 50 MG: 50 TABLET, FILM COATED ORAL at 05:06

## 2024-06-08 RX ADMIN — THIAMINE HYDROCHLORIDE 500 MG: 100 INJECTION, SOLUTION INTRAMUSCULAR; INTRAVENOUS at 03:06

## 2024-06-08 RX ADMIN — DIAZEPAM 10 MG: 10 INJECTION, SOLUTION INTRAMUSCULAR; INTRAVENOUS at 12:06

## 2024-06-08 RX ADMIN — FOLIC ACID 1 MG: 5 INJECTION, SOLUTION INTRAMUSCULAR; INTRAVENOUS; SUBCUTANEOUS at 10:06

## 2024-06-08 RX ADMIN — ACETAMINOPHEN 650 MG: 325 TABLET ORAL at 03:06

## 2024-06-08 RX ADMIN — Medication 6 MG: at 11:06

## 2024-06-08 RX ADMIN — THIAMINE HYDROCHLORIDE 500 MG: 100 INJECTION, SOLUTION INTRAMUSCULAR; INTRAVENOUS at 09:06

## 2024-06-08 RX ADMIN — SODIUM CHLORIDE, POTASSIUM CHLORIDE, SODIUM LACTATE AND CALCIUM CHLORIDE: 600; 310; 30; 20 INJECTION, SOLUTION INTRAVENOUS at 07:06

## 2024-06-08 RX ADMIN — DIAZEPAM 10 MG: 5 TABLET ORAL at 03:06

## 2024-06-08 RX ADMIN — THIAMINE HYDROCHLORIDE 500 MG: 100 INJECTION, SOLUTION INTRAMUSCULAR; INTRAVENOUS at 08:06

## 2024-06-08 RX ADMIN — APIXABAN 5 MG: 5 TABLET, FILM COATED ORAL at 09:06

## 2024-06-08 RX ADMIN — DIAZEPAM 10 MG: 5 TABLET ORAL at 08:06

## 2024-06-08 RX ADMIN — ACETAMINOPHEN 650 MG: 325 TABLET ORAL at 09:06

## 2024-06-08 RX ADMIN — SODIUM CHLORIDE, SODIUM LACTATE, POTASSIUM CHLORIDE, CALCIUM CHLORIDE AND DEXTROSE MONOHYDRATE: 5; 600; 310; 30; 20 INJECTION, SOLUTION INTRAVENOUS at 11:06

## 2024-06-08 RX ADMIN — DIAZEPAM 10 MG: 5 TABLET ORAL at 09:06

## 2024-06-08 RX ADMIN — METOPROLOL SUCCINATE 50 MG: 50 TABLET, EXTENDED RELEASE ORAL at 09:06

## 2024-06-08 RX ADMIN — MAGNESIUM SULFATE HEPTAHYDRATE 2 G: 40 INJECTION, SOLUTION INTRAVENOUS at 11:06

## 2024-06-08 RX ADMIN — DIAZEPAM 10 MG: 10 INJECTION, SOLUTION INTRAMUSCULAR; INTRAVENOUS at 01:06

## 2024-06-08 RX ADMIN — LOSARTAN POTASSIUM 50 MG: 50 TABLET, FILM COATED ORAL at 09:06

## 2024-06-08 RX ADMIN — APIXABAN 5 MG: 5 TABLET, FILM COATED ORAL at 08:06

## 2024-06-08 RX ADMIN — SODIUM CHLORIDE, SODIUM LACTATE, POTASSIUM CHLORIDE, CALCIUM CHLORIDE AND DEXTROSE MONOHYDRATE: 5; 600; 310; 30; 20 INJECTION, SOLUTION INTRAVENOUS at 10:06

## 2024-06-08 RX ADMIN — ONDANSETRON 4 MG: 2 INJECTION INTRAMUSCULAR; INTRAVENOUS at 09:06

## 2024-06-08 RX ADMIN — DIAZEPAM 5 MG: 10 INJECTION, SOLUTION INTRAMUSCULAR; INTRAVENOUS at 10:06

## 2024-06-08 RX ADMIN — ESCITALOPRAM OXALATE 20 MG: 5 TABLET, FILM COATED ORAL at 09:06

## 2024-06-08 RX ADMIN — TRAZODONE HYDROCHLORIDE 200 MG: 100 TABLET ORAL at 08:06

## 2024-06-08 RX ADMIN — OLANZAPINE 7.5 MG: 5 TABLET, FILM COATED ORAL at 08:06

## 2024-06-08 RX ADMIN — LEVOTHYROXINE SODIUM 75 MCG: 75 TABLET ORAL at 05:06

## 2024-06-08 NOTE — CLINICAL REVIEW
"RAPID RESPONSE NURSE CHART REVIEW        Chart Reviewed: 06/08/2024, 7:09 AM    MRN: [id number]  Bed: 640/640 A    Dx: Alcohol use disorder, severe, dependence    Earl Abdul has a past medical history of Alcoholism, Anemia, Aortic atherosclerosis, Cancer of breast, CLL (chronic lymphocytic leukemia), Controlled type 2 diabetes mellitus without complication, without long-term current use of insulin, COPD (chronic obstructive pulmonary disease), Depression, Diverticulitis, Fatty liver, GERD (gastroesophageal reflux disease), Hyperlipidemia, Hypertension, Pancreatitis, Peptic ulcer disease, Polysubstance abuse, Posterior reversible encephalopathy syndrome, Sarcoidosis of lung, and Suicide attempt.    Last VS: BP (!) 167/78 (BP Location: Left arm, Patient Position: Lying)   Pulse 76   Temp 98.5 °F (36.9 °C) (Oral)   Resp 16   Ht 5' 6" (1.676 m)   Wt 77.1 kg (170 lb)   SpO2 96%   BMI 27.44 kg/m²     24H Vital Sign Range:  Temp:  [98 °F (36.7 °C)-98.9 °F (37.2 °C)]   Pulse:  []   Resp:  [16-51]   BP: (133-178)/()   SpO2:  [85 %-96 %]     Level of Consciousness (AVPU): alert    Recent Labs     06/07/24  0807 06/07/24  1611 06/08/24  0229   WBC 62.08* 33.81* 20.82*   HGB 9.9* 9.9* 8.9*   HCT 34.3* 32.9* 29.1*   * 103* 97*       Recent Labs     06/07/24  0807 06/08/24  0229    138   K 4.2 3.9   CL 98 101   CO2 20* 23   BUN 16 10   CREATININE 0.9 0.9   GLU 61* 63*   PHOS  --  2.2*   MG 1.9 1.7      OXYGEN:  Flow (L/min) (Oxygen Therapy): 2     MEWS score: 1    Rounding completed w/ charge CLAUDIA Hoskins. VSS.  No additional concerns verbalized at this time. Instructed to call [facility] for further concerns or assistance.    Luba Farmer RN       "

## 2024-06-08 NOTE — ASSESSMENT & PLAN NOTE
64F with longstanding EtOH use disorder, EtOH withdrawals and seizures (2017), depression and hepatic steatosis. Pt with history of heavy drinking for past 14 years and numerous rehab attempts. Previously started on naltrexone during last admission for withdrawal in 04/2024. Follows with Psychiatry outpatient. Last drink 30 min prior to presentation, reports drinking daily.    Plan:  - Due to history of previous withdrawal seizures, will start PO Valium 10mg QID and taper daily as follows and as tolerated by patient:  -Day 1 Start PO diazepam 10mg QID  -Day 2 Valium 10 mg TID, less tremors and more comfortable  -Day 3 Valium 10 mg BID  -Day 4 Valium 5 mg BID  -Day 5 Valium 5 mg qd and then stop taper  - CIWA scale in place with symptom triggered ativan if CIWA > 8:  - Mild (CIWA 8-15): IV diazepam 5 mg q4h PRN  - Moderate (CIWA 16-25): IV diazepam 10 mg q2h for 3 doses  - Supplement Folate 1mg and Thiamine 500mg TID x3d  - Monitor for continued signs of withdrawal  - Daily CBC, CMP  - Boost TID added for nutrition  - Addiction psych consulted, appreciate assistance

## 2024-06-08 NOTE — PLAN OF CARE
[facility]  Discharge Assessment    Primary Care Provider: Andrew Rodriguez MD     Discharge Assessment (most recent)       BRIEF DISCHARGE ASSESSMENT - 06/08/24 1212          Discharge Planning    Assessment Type Discharge Planning Brief Assessment     Resource/Environmental Concerns none     Support Systems Spouse/significant other     Equipment Currently Used at Home none     Current Living Arrangements home (P)      Patient/Family Anticipates Transition to other (see comments) (P)    Psych facilty or Home with family    Patient/Family Anticipated Services at Transition mental health services (P)      DME Needed Upon Discharge  -- (P)    tbd    Discharge Plan A Psychiatric hospital (P)      Discharge Plan B Home with family (P)                    Pt is on a PEC hold due to suicidal ideation.  Pt was independent with ADL's prior to admission and uses no DME, is not on dialysis or Coumadin. [facility]  SW will follow for all discharge planning needs.     KHURRAM Laura, VARUN  [facility]  [id number]

## 2024-06-08 NOTE — PLAN OF CARE
Problem: Violence Risk or Actual  Goal: Anger and Impulse Control  Outcome: Progressing     Problem: Suicide Risk  Goal: Absence of Self-Harm  Outcome: Progressing     Problem: Adult Inpatient Plan of Care  Goal: Plan of Care Review  Outcome: Progressing  Goal: Patient-Specific Goal (Individualized)  Outcome: Progressing  Goal: Absence of Hospital-Acquired Illness or Injury  Outcome: Progressing  Goal: Optimal Comfort and Wellbeing  Outcome: Progressing  Goal: Readiness for Transition of Care  Outcome: Progressing     Problem: Diabetes Comorbidity  Goal: Blood Glucose Level Within Targeted Range  Outcome: Progressing     Problem: Infection  Goal: Absence of Infection Signs and Symptoms  Outcome: Progressing

## 2024-06-08 NOTE — SUBJECTIVE & OBJECTIVE
Interval History: Less tremors, decreasing Valium to TID with continued CIWA, pt denies SI or thought, still PECed    Review of Systems  Objective:     Vital Signs (Most Recent):  Temp: 98.9 °F (37.2 °C) (06/08/24 1112)  Pulse: 76 (06/08/24 1112)  Resp: 18 (06/08/24 1112)  BP: (!) 195/86 (06/08/24 1112)  SpO2: 95 % (06/08/24 1112) Vital Signs (24h Range):  Temp:  [98 °F (36.7 °C)-98.9 °F (37.2 °C)] 98.9 °F (37.2 °C)  Pulse:  [] 76  Resp:  [16-18] 18  SpO2:  [85 %-100 %] 95 %  BP: (167-195)/(78-98) 195/86     Weight: 77.1 kg (170 lb)  Body mass index is 27.44 kg/m².    Intake/Output Summary (Last 24 hours) at 6/8/2024 1430  Last data filed at 6/8/2024 0057  Gross per 24 hour   Intake --   Output 750 ml   Net -750 ml         Physical Exam  Constitutional:       Appearance: She is ill-appearing.      Comments: Generalized tremors   HENT:      Head: Normocephalic.      Mouth/Throat:      Mouth: Mucous membranes are dry.   Eyes:      Extraocular Movements: Extraocular movements intact.   Cardiovascular:      Rate and Rhythm: Regular rhythm. Tachycardia present.      Heart sounds: No murmur heard.  Pulmonary:      Effort: No respiratory distress.      Breath sounds: No wheezing.   Abdominal:      General: Abdomen is flat. There is no distension.      Tenderness: There is no abdominal tenderness.   Musculoskeletal:         General: No swelling or tenderness.      Cervical back: Normal range of motion.   Skin:     Coloration: Skin is not jaundiced.   Neurological:      Mental Status: She is oriented to person, place, and time.   Psychiatric:      Comments: Denies SI or thoughts             Significant Labs: All pertinent labs within the past 24 hours have been reviewed.  CBC:   Recent Labs   Lab 06/07/24  0807 06/07/24  1611 06/08/24  0229   WBC 62.08* 33.81* 20.82*   HGB 9.9* 9.9* 8.9*   HCT 34.3* 32.9* 29.1*   * 103* 97*     CMP:   Recent Labs   Lab 06/07/24  0807 06/08/24  0229    138   K 4.2 3.9   CL  98 101   CO2 20* 23   GLU 61* 63*   BUN 16 10   CREATININE 0.9 0.9   CALCIUM 8.6* 8.8   PROT 7.2 6.1   ALBUMIN 4.3 3.7   BILITOT 0.6 0.9   ALKPHOS 63 57   AST 47* 39   ALT 18 17   ANIONGAP 20* 14       Significant Imaging: I have reviewed all pertinent imaging results/findings within the past 24 hours.

## 2024-06-08 NOTE — CONSULTS
"[id number]}  INITIAL VISIT: ADDICTION PSYCHIATRY CONSULTATION SERVICE      ASSESSMENT AND PLAN:     DIAGNOSES & PROBLEMS:  Alcohol use disorder, severe  Major depressive disorder, recurrent moderate   Generalized anxiety disorder   Unspecified neurocognitive disorder    In Summary:  Pt is a 64yo female with past psych history of alcohol use disorder (hx of complicated withdrawal, including seizures/delirium tremens), MDD, HEATHER, & past medical history of COPD, HTN, HLD, subclinical hypothyroidism, chronic lymphocytic leukemia, T2DM, Afib with RVR, fibromyalgia, and fatty liver, who is admitted for alcohol withdrawal. Psychiatry consulted for "depression & passive SI, presented with intoxication and concern for withdrawal, consult for management recs". Patient well known to [facility] and [facility]. Patient recently discharged from [facility] on 5/21/24. Please see Dr. Patel's plan of care note from 5/21/24 for details. Patient reports she relapsed and was drinking a half of a fifth of vodka daily because her  left her last week. Also notes worsening depressed mood during this time and has not taken any of her home psychiatric medications during this time. States that she does not wish to pursue inpatient rehab at this time but would be interested in IOP placement. Will restart home psychiatric medications and will plan for full Addiction team to evaluate readiness for IPR placement on Monday.     Plan:  - restart home psychiatric medications:   Bupropion 200 mg PO daily  Naltrexone 50 mg PO daily   Zyprexa 7.5 mg PO nightly   Trazodone 200 mg PO nightly   With reasonable medical certainty, based on history, chart review, available collateral information, and a present-state examination:  - the patient is deemed to be currently best managed on a medical unit, owing to the need for medical stabilization  - the need for psychiatric hospitalization is currently NOT anticipated upon stabilization of acute medical " condition  - PEC is not indicated  - adjustments to psychotropic regimen as noted herin  - defer non-psychiatric medication(s) and management to the current provider(s) of record  - continue alcohol (or sedative-hypnotic) withdrawal protocol, including supportive measures,frequent monitoring of vitals and CIWA-Ar, and use of PRN +/- standing benzodiazepines (see herein for dosing guidance and/or recommendations)  - provide supportive care as warranted: e.g., fluid and electrolyte replacement, vitamin repletion (thiamine, folic acid, multivitamin), adequate caloric support  - continue detox taper per primary team  - options for medication-assisted treatment (MAT) for alcohol use disorder were discussed  - continue MAT as prescribed  - routine monitoring of liver function while on MAT  -- RECOMMENDATIONS STATUS POST DETOX: establish and maintain sobriety and a recovery lifestyle, complete a licensed accredited rehab program, and engage in 12 step (or equivalent) meetings and activities  - recommend patient enroll in addiction rehab program after discharge and medical stabilization  - counseled on full abstinence from alcohol and substances of abuse (illicit and prescription)    PRESENTATION:     Earl Abdul presents with the following chief complaint: problematic substance use/abuse and alcohol and/or drug addiction    Per Chart:  Earl Abdul is a 64 year old woman with history of COPD, HTN, HLD, subclinical hypothyroidism, chronic lymphocytic leukemia, T2DM, Afib with RVR, fibromyalgia, and fatty liver disease, who presents with alcohol intoxication and nausea. Patient reports that she wants to detox, and reports that her last drink was 30 minutes prior to presentation. She drinks 1 bottle of liquor per day. She endorses multiple episodes of nonbloody nonbilious emesis, generalized mild abdominal discomfort. She expresses interest in going to rehab and wants to get clean from alcohol. She denies chest  pain, shortness of breath, sore throat, fever, chills, polyuria, dysuria, polysubstance abuse. She endorses passive suicidality without a plan or intent, denies homicidal ideation, denies AVH. Previously admitted for EtOH withdrawal in April 2024, seen by Psychiatry and was started on naltrexone 50mg daily and lexapro was increased to 20mg daily. After detox, she went to residential rehab and completed 30 day program. Last seen in clinic by Psych on 5/20 where she was restarted on naltrexone 50 and continued on her prior psych medications.     In the ED, afebrile, tachycardic HR 100s, and hypertensive -170s. Found to be hypoxemic and placed on NC. Labs notable for WBC 62, Hgb 9.9, serum EtOH 242. Due to endorsed passive SI, PEC ordered. Received droperidol and zofran (from EMS) for nausea, and in the ED was given 1L IVF, 25mg benadryl, and 2 doses of diazepam based on CIWA scale. Patient admitted to hospital medicine for prevention and management of alcohol withdrawal.    Per Patient:  Patient reports she relapsed when her  moved out of their home last week because of her drinking. She states she started drinking a half of a fifth of vodka daily and has not been taking her psychiatric medications during this time. Reports that she has been depressed since losing her son years ago, but that depression worsened when  moved out. She is interested in IOP but not IPR at this time. She denies SI/HI/AVH.     Collateral:    at bedside corroborates story as above.       REVIEW OF SYSTEMS:  I[]I Patient denies any pertinent ROS, and none is known.  I[]I Patient unable or unwilling to provide any ROS.    [x] Y  [] N  sleep disturbance: **   [x] Y  [] N  appetite/weight change: **   [x] Y  [] N  fatigue/anergia: **  [x] Y  [] N  impairment in focus/concentration: **     [x] Y  [] N  depression: **   [x] Y  [] N  anxiety/worry: **   [x] Y  [] N  dysregulated mood/behavior:  **   [] Y  [x] N  manic symptomatology: **   [] Y  [x] N  psychosis: **     A pertinent medical review of systems was performed with the following notable findings: as noted in HPI    CURRENT PSYCHOTROPIC REGIMEN:  I[x]I Y  I[]I N  I[]I U    Bupropion 200 mg PO daily  Naltrexone 50 mg PO daily   Zyprexa 7.5 mg PO nightly   Trazodone 200 mg PO nightly   ADDICTION:      I[]I N/A  I[]I Y  I[x]I N  I[]I U  WITHDRAWAL SYMPTOMS:   I[]I N/A  I[x]I Y  I[]I N  I[]I U  CRAVINGS:      I[]I Y  I[x]I N  I[]I U  I[]I Current  I[]I Former  Nicotine Use:   I[x]I Y  I[]I N  I[]I U  I[]I Current  I[]I Former  Alcohol Use:   I[x]I Y  I[]I N  I[]I U  I[]I Current  I[]I Former  Alcohol Misuse/Abuse:   I[]I Y  I[x]I N  I[]I U  I[]I Current  I[]I Former  Illicit Drug Use/Misuse/Abuse:   I[]I Y  I[x]I N  I[]I U  I[]I Current  I[]I Former  Misuse/Abuse of Rx Medications:   I[]I Cannabis  I[]I Cocaine  I[]I Heroin  I[]I Meth  I[]I Opioids  I[]I Stimulants  I[]I Benzos  I[]I Other:      I[]I N/A  I[]I U  Substance(s) of Choice: alcohol   I[]I N/A  I[]I U  Substance(s) Used Currently/Recently: alcohol  I[]I N/A  I[]I U  Alcohol Consumption: Was drinking 1/5th of vodka daily at peak use  I[]I N/A  I[]I U  Last Drink: About 35 days ago (Mid April)  I[x]I N/A  I[]I U  Last Drug Use:   I[]I N/A  I[]I U  Duration of Sobriety/Abstinence: About 35 days     I[x]I Y  I[]I N  I[]I U  Hx of Detox:   I[x]I Y  I[]I N  I[]I U  Hx of Rehab:   I[]I Y  I[x]I N  I[]I U  Hx of IVDU:   I[]I Y  I[x]I N  I[]I U  Hx of Accidental Overdose:   I[]I Y  I[x]I N  I[]I U  Hx of DUI:   I[x]I Y  I[]I N  I[]I U  Hx of Complicated Withdrawal (i.e. Seizures and/or Delirium Tremens):   I[]I Y  I[]I N  I[x]I U  Hx of Known/Suspected Substance-Induced Psychiatric Disorder:   I[x]I Y  I[]I N  I[]I U  Hx of Medication Assisted Treatment:   I[x]I Y  I[]I N  I[]I U  Hx of Twelve Step Program (or Equivalent) Involvement:   I[]I Y  I[x]I N  I[]I U  Currently Exhibits Signs of  "Intoxication:   I[]I Y  I[x]I N  I[]I U  Currently Exhibits Signs of Withdrawal:   I[]I Y  I[x]I N  I[]I U  Currently Active in Recovery:   I[x]I Y  I[]I N  I[]I U  Social Support:   I[x]I Y  I[]I N  I[]I U  Spouse/Partner Consumption:  doesn't drink in front of her and there is no alcohol in the home currently     I[]I N/A  I[x]I Y  I[]I N  I[]I U  Acknowledges/Accepts Addiction:   I[]I N/A  I[x]I Y  I[]I N  I[]I U  Advised to Quit/Cut Back:   I[]I N/A  I[x]I Y  I[]I N  I[]I U  Alcohol/Drug Cessation ("Wants to Quit"): Patient Successfully Quit, Motivational Interviewing Provided, Medication Assisted Treatment Prescribed  I[]I N/A  I[x]I Y  I[]I N  I[]I U  Motivation to Pursue Treatment: health (pt says she has fatty liver and beginnings of cirrhosis)  I[x]I N/A  I[]I Y  I[]I N  I[]I U  Tobacco Cessation ("Wants to Quit"):      I[]I N/A  I[x]I Y  I[]I N  I[]I U  I[]I A  Awareness of Biomedical Complications:   I[]I N/A  I[x]I Y  I[]I N  I[]I U  I[]I A  Understands Need for Lifetime Sobriety:      View/Acceptance of Twelve Step (or Equivalent) Programs: states she hates AA, but is willing to go     DSM-5-TR SUBSTANCE USE DISORDER CRITERIA:      -- Impaired Control:  I[x]I Y  I[]I N  I[]I U  I[]I A  I[]I D  Often take in larger amounts or over a longer period of time than was intended:   I[x]I Y  I[]I N  I[]I U  I[]I A  I[]I D  Persistent desire or unsuccessful efforts to cut down or control use:   I[]I Y  I[]I N  I[x]I U  I[]I A  I[]I D  Great deal of time spent in activities necessary to obtain substance, use, or recover from effects:   I[x]I Y  I[]I N  I[]I U  I[]I A  I[]I D  Craving/strong desire for substance or urge to use:   -- Social Impairment:  I[x]I Y  I[]I N  I[]I U  I[]I A  I[]I D  Use resulting in failure to fulfill major role obligations at home, work or school:   I[x]I Y  I[]I N  I[]I U  I[]I A  I[]I D  Social, occupational, recreational activities decreased because of use:   I[]I Y  I[]I N  " "I[x]I U  I[]I A  I[]I D  Continued use despite having persistent or recurrent social or interpersonal problems caused or exacerbated by the substance:   -- Risky Use:  I[x]I Y  I[]I N  I[]I U  I[]I A  I[]I D  Recurrent use in situations in which it is physically hazardous:   I[x]I Y  I[]I N  I[]I U  I[]I A  I[]I D  Use despite physical or psychological problems that are likely to have been caused or exacerbated by the substance:   -- Neuroadaptation:  I[x]I Y  I[]I N  I[]I U  I[]I A  I[]I D  Tolerance, as defined by either of the following: (1) a need for markedly increased amounts of substance to achieve intoxication or desired effect.  -OR- (2) a markedly diminished effect with continued use of the same amount of substance:   I[x]I Y  I[]I N  I[]I U  I[]I A  I[]I D  Withdrawal, as manifested by either of the following: (1) the characteristic withdrawal syndrome for substance.  -OR- (2) substance is taken to relieve or avoid withdrawal symptoms:   -- Mild (1-3), Moderate (4-5), Severe (?6)     I[]I N/A  I[x]I Y  I[]I N  I[]I U  I[]I A  I[]I D  Active Substance Use Disorder:         HISTORY:      I[x]I Y  I[]I N  I[]I U  Psychiatric Diagnoses: major depressive disorder, generalized anxiety disorder   I[]I Y  I[x]I N  I[]I U  Current Psychiatric Provider (if Applicable):   I[]I Y  I[x]I N  I[]I U  Hx of Psychiatric Hospitalization:   I[]I Y  I[x]I N  I[]I U  Hx of Outpatient Psychiatric Treatment (psychiatry/psychotherapy): therapist "Geoffrey", has seen 5 years, has not seen since getting out of rehab  I[x]I Y  I[]I N  I[]I U  Psychotropic Trials: Cymbalta, lexapro, trazodone, seroquel, zyprexa, naltrexone  I[]I Y  I[x]I N  I[]I U  Prior Suicide Attempts:   I[x]I Y  I[]I N  I[]I U  Hx of Suicidal Ideation: yes, last about 5 months ago  I[]I Y  I[x]I N  I[]I U  Hx of Homicidal Ideation:   I[]I Y  I[x]I N  I[]I U  Hx of Self-Injurious Behavior (Non-Suicidal):   I[]I Y  I[x]I N  I[]I U  Hx of Violence:   I[]I Y  I[x]I N  " I[]I U  Documented Hx of Malingering:      FAMILY HISTORY:  I[x]I Y  I[]I N  I[]I U    Son-alcohol and opioid abuse     I[]I Y  I[]I N  I[x]I U  Hx of Trauma/Neglect:   I[x]I Y  I[]I N  I[]I U  Hx of Physical Abuse:   I[]I Y  I[x]I N  I[]I U  Hx of Sexual Abuse:   I[]I Y  I[x]I N  I[]I U  Happy Childhood:   I[]I Y  I[x]I N  I[]I U  Significant Developmental Delay/Disability:   I[x]I Y  I[]I N  I[]I U  GED/High School Diploma or Beyond:   I[x]I Y  I[]I N  I[]I U  Currently Employed:   I[]I Y  I[x]I N  I[]I U  On or Applying for Disability:   I[x]I Y  I[]I N  I[]I U  Functions Independently:   I[]I Y  I[x]I N  I[]I U  Financially Stable:   I[x]I Y  I[]I N  I[]I U  Domiciled:   I[]I Y  I[x]I N  I[]I U  Lives Alone:   I[x]I Y  I[]I N  I[]I U  Intact Support System:   I[x]I Y  I[]I N  I[]I U  Heterosexual/Cisgender:   I[x]I Y  I[]I N  I[]I U  Currently in a Romantic Relationship:   I[x]I Y  I[]I N  I[]I U  Ever :   I[x]I Y  I[]I N  I[]I U  Children/Dependents: one adult son, strained relationship  I[x]I Y  I[]I N  I[]I U  Worship/Spiritual: identifies as being spiritual  I[]I Y  I[x]I N  I[]I U   History:   I[]I Y  I[x]I N  I[]I U  Current Legal Issues:   I[]I Y  I[x]I N  I[]I U  Past Charges/Convictions:   I[]I Y  I[x]I N  I[]I U  Hx of Incarceration:   I[]I Y  I[]I N  I[x]I U  Access to a Gun?:      I[]I Y  I[]I N  I[x]I U  Hx of Seizure:   I[]I Y  I[x]I N  I[]I U  Hx of Significant Head Injury (e.g., Loss of Consciousness, Concussion, Coma):   I[]I Y  I[]I N  I[]I U  Medical History & Diagnoses:        The patient's past medical history has been reviewed and updated as appropriate within the electronic medical record system.  Problem List:  2024-04: Aortic atherosclerosis  2024-04: Moderate malnutrition  2024-04: Atrial fibrillation with RVR  2024-04: Leukocytosis  2024-03: Weakness of left upper extremity  2024-03: Anemia, chronic disease  2024-03: Subclinical hyperthyroidism  2024-03: Paroxysmal  atrial fibrillation  2024-02: Rhabdomyolysis  2024-02: Left forearm pain  2023-11: Urinary retention  2023-11: SIRS (systemic inflammatory response syndrome)  2023-11: Suicide attempt by cutting of wrist  2023-11: Hyperkalemia  2023-09: Lactic acidosis  2023-09: Metabolic acidosis  2023-09: Acute alcoholic intoxication with complication  2023-06: Acute hypoxemic respiratory failure  2023-06: Hypernatremia  2023-04: Palliative care encounter  2023-04: Advance care planning  2023-04: Goals of care, counseling/discussion  2023-04: Depression  2023-04: Leg weakness, bilateral  2023-04: Acute hypercapnic respiratory failure  2023-03: Esophagitis  2022-09: CLL (chronic lymphocytic leukemia)  2022-09: Migraine  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  2022-09: 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  2022-07: OAB (overactive bladder)  2022-06: Migraine without aura and with status migrainosus, not   intractable  2022-06: Lymphocytosis  2022-05: E coli bacteremia  2022-04: Starvation ketoacidosis  2022-01: Chronic respiratory failure with hypoxia  2022-01: COVID-19  2021-12: Fecal incontinence  2021-12: Mixed incontinence  2021-12: Pelvic floor tension  2021-11: Incontinence of feces  2021-11: Low serum vitamin B12  2021-11: Anemia  2021-11: Urge incontinence of urine  2021-11: Hypothyroidism  2021-11: Type 2 diabetes mellitus without complication, without long-  term current use of insulin  2021-11: Obesity (BMI 30.0-34.9)  2021-04: Alcoholic gastritis without bleeding  2021-04: Prolonged QT interval  2021-04: Nausea and Vomiting with Epigastric Pain  2021-04: COPD (chronic obstructive pulmonary disease)  2020-11: Malignant neoplasm of central portion of right breast in   female, estrogen receptor positive  2020-10: At risk for lymphedema  2020-07: 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  2020-02: Intractable vomiting with nausea  2020-02:  Hypokalemia  2020-02: Transaminitis  2019-10: Suicidal ideation  2019-10: Thrombocytopenia  2019-10: Greater trochanteric bursitis  2019-09: Muscle weakness of lower extremity  2019-09: Difficulty walking  2018-11: Decreased ROM of lumbar spine  2018-11: Weakness  2017-11: Degenerative joint disease (DJD) of lumbar spine  2017-08: Depression with anxiety  2017-08: PUD (peptic ulcer disease)  2017-08: RLS (restless legs syndrome)  2017-07: Generalized abdominal pain  2017-07: Nausea vomiting and diarrhea  2017-07: Chest pain  2017-07: History of sarcoidosis  2017-07: Posterior reversible encephalopathy syndrome  2017-07: Abdominal pain  2017-07: New onset seizure  2017-07: Intractable nausea and vomiting  2016-09: Lethargy  2016-09: Viral upper respiratory tract infection  2016-09: Hypotension  2015-12: Cirrhosis  2014-10: Clostridium enterocolitis  2014-10: Ascites  2014-10: Bloody emesis  2014-10: Hypomagnesemia  2014-10: Hypophosphatemia  2014-10: Fungal infection of skin  2014-10: Elevated liver enzymes  2014-10: Hypokalemia  2014-10: Nausea  2014-10: Blood in stool  2014-10: Abdominal pain  2014-10: Pyelonephritis due to Escherichia coli  2014-10: History of Clostridium difficile colitis  2014-10: Diarrhea  2014-10: Dehydration  2014-10: DVEANTE (acute kidney injury)  2014-10: Sepsis  2014-02: Anxiety and Depression  2014-02: Alcohol use disorder, severe, dependence  2014-02: Alcohol withdrawal  2014-02: Essential hypertension  2014-02: Sarcoidosis of lung  2014-02: Fibromyalgia  2014-02: Tobacco abuse  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  2013-08: Erythema nodosum  2012-11: Benign essential hypertension  2012-11: Hyperlipidemia  2012-01: Closed dislocation of ankle  2012-01: Sprain of ankle, unspecified site  2011-09: Cannabis abuse, in remission  2011-09: Other and unspecified alcohol dependence, in remission  2011-05: Other and unspecified alcohol dependence, unspecified   drinking behavior  2011-05: Other and  unspecified alcohol dependence, continuous   drinking behavior  2011-03: Acute alcoholic hepatitis  2011-03: Sarcoidosis  2011-01: Nausea alone  2010-11: Person with feared complaint in whom no diagnosis was made  2010-05: Alcoholic fatty liver  2010-05: Nonspecific abnormal results of liver function study  2009-12: Contusion of back  2009-12: Contusion of knee  Severe sepsis  Altered mental status, unspecified  Substance abuse  VINICIO (obstructive sleep apnea)  Hypoxia  Shortness of breath  GERD (gastroesophageal reflux disease)      Scheduled and PRN Medications: The electronic chart was reviewed and updated as appropriate.  See Medcard for details.    Current Facility-Administered Medications:     apixaban tablet 5 mg, 5 mg, Oral, BID, Juan Luis Allen MD, 5 mg at 06/08/24 0929    dextrose 10% bolus 125 mL 125 mL, 12.5 g, Intravenous, PRN, Kvng Parsons MD    dextrose 10% bolus 250 mL 250 mL, 25 g, Intravenous, PRN, Kvng Parsons MD    dextrose 5 % in lactated ringers infusion, , Intravenous, Continuous, Juan Luis Allen MD, Last Rate: 100 mL/hr at 06/08/24 1122, New Bag at 06/08/24 1122    diazePAM injection 5 mg, 5 mg, Intravenous, Q4H PRN, Maxine Garcia MD, 5 mg at 06/07/24 1443    diazePAM tablet 10 mg, 10 mg, Oral, TID, Juan Luis Allen MD    EScitalopram oxalate tablet 20 mg, 20 mg, Oral, Daily, Juan Luis Allen MD, 20 mg at 06/08/24 0929    folic acid 1 mg in dextrose 5 % (D5W) 100 mL IVPB, 1 mg, Intravenous, Daily, Kvng Parsons MD, Stopped at 06/08/24 1115    glucagon (human recombinant) injection 1 mg, 1 mg, Intramuscular, PRN, Kvng Pasrons MD    glucose chewable tablet 16 g, 16 g, Oral, PRN, Kvng Parsons MD    glucose chewable tablet 24 g, 24 g, Oral, PRN, Kvng Parsons MD, 24 g at 06/07/24 1301    levalbuterol nebulizer solution 0.3108 mg, 0.3108 mg, Nebulization, Q8H PRN, Juan Luis Allen MD    levothyroxine tablet 75 mcg, 75 mcg, Oral, Before breakfast, Juan Luis Allen MD, 75 mcg at 06/08/24 0535    losartan  "tablet 50 mg, 50 mg, Oral, Daily, Kvng Parsons MD, 50 mg at 06/08/24 0929    melatonin tablet 6 mg, 6 mg, Oral, Nightly PRN, Kvng Parsons MD    metoprolol succinate (TOPROL-XL) 24 hr tablet 50 mg, 50 mg, Oral, Daily, Juan Luis Allen MD, 50 mg at 06/08/24 0929    naloxone 0.4 mg/mL injection 0.02 mg, 0.02 mg, Intravenous, PRN, Kvng Parsons MD    OLANZapine tablet 7.5 mg, 7.5 mg, Oral, QHS, Juan Luis Allen MD, 7.5 mg at 06/07/24 2109    ondansetron injection 4 mg, 4 mg, Intravenous, Q6H PRN, Kvng Parsons MD, 4 mg at 06/08/24 0935    sodium chloride 0.9% flush 10 mL, 10 mL, Intravenous, Q12H PRN, Kvng Parsons MD    thiamine (B-1) 500 mg in dextrose 5 % (D5W) 100 mL IVPB, 500 mg, Intravenous, TID, Kvng Parsons MD, Stopped at 06/08/24 0958    Facility-Administered Medications Ordered in Other Encounters:     albuterol sulfate nebulizer solution 2.5 mg, 2.5 mg, Nebulization, Once, Andrew Rodriguez MD    Allergies:  Lortab [hydrocodone-acetaminophen], Promethazine, and Albuterol    PSYCHOSOCIAL FACTORS:  Stressors (Biopsychosocial, Cultural and Environmental): marriage, physical health, substance use/addiction, loneliness, grief  Functioning Relationships: good support system    STRENGTHS AND LIABILITIES:   Strength: Patient accepts guidance/feedback.  Strength: Patient is accepting of treatment.  Strength: Patient is seeking help.  Strength: Patient is kind.  Liability: Patient has poor or no support network.  Liability: Patient has poor health.  Liability: Patient is acutely decompensated.  Liability: Patient lacks coping skills  Liability: Patient is in active addiction.       Additional Relevant History, As Applicable:       EXAMINATION:     BP (!) 195/86 (Patient Position: Lying)   Pulse 76   Temp 98.9 °F (37.2 °C) (Oral)   Resp 18   Ht 5' 6" (1.676 m)   Wt 77.1 kg (170 lb)   SpO2 95%   BMI 27.44 kg/m²     MENTAL STATUS EXAMINATION:  General Appearance: **   dressed in hospital garb, lying in bed, " "appears older than stated age, disheveled  Behavior: **   normal, cooperative, friendly, appropriate eye-contact  Involuntary Movements and Motor Activity: **   +tremors  Gait and Station: **   unable to assess - patient lying down or seated  Speech and Language: **   intact; normal rate, rhythm, volume, tone, and pitch; conversational, spontaneous, and coherent; speaks and understands English proficiently and fluently; repeats words and phrases, no word finding difficulties are noted  Mood: "depressed"  Affect: **   constricted  Thought Process and Associations: **   intact; linear, goal-directed, organized, and logical; no loosening of associations noted  Thought Content and Perceptions: **   no suicidal or homicidal ideation, no auditory or visual hallucinations, no paranoid ideation, no ideas of reference, no evidence of delusions or psychosis  Sensorium: **   alert and oriented, with clear sensorium  Recent and Remote Memory: **   forgetful, memory impairments noted  Attention and Concentration: **   impaired, able to spell WORLD forwards, unable to spell WORLD backwards  Fund of Knowledge: **   impaired, unaware of current events  Insight: **   limited, limited/partial awareness of illness and situation  Judgment: **   intact, behavior is adequate/appropriate given the circumstances      RISK MANAGEMENT:     I[]I Y  I[x]I N  I[]I U  I[]I A  Suicidal Ideation/Behavior: ** no passive ideation, no active ideation, no plan, no intent  I[]I Y  I[x]I N  I[]I U  I[]I A  Homicidal Ideation/Behavior: **  I[]I Y  I[x]I N  I[]I U  I[]I A  Violence: **  I[]I Y  I[x]I N  I[]I U  I[]I A  Self-Injurious Behavior: **    The patient is deemed to be a reliable and factually accurate historian.    I[x]I Y  I[]I N  I[]I U  I[]I A  I[]I N/A  Minimization of Risk Parameters Suspected/Evident: **  I[]I Y  I[x]I N  I[]I U  I[]I A  I[]I N/A  Exaggeration of Risk Parameters Suspected/Evident: **    [] Y  [x] N  Danger to Self: "   [] Y  [x] N  Danger to Others:   [] Y  [x] N  Grave Disability:       In cases of emergency, daily coverage provided by Acute/ER Psych MD, NP, PA, or SW, with contact numbers located in [facility] On Call Schedule.    Roxann Salazar DO  Department of Psychiatry  [facility]      KEY:     I[]I Y = Yes / Present / Endorses  I[]I N = No / Absent / Denies  I[]I U = Unknown / Unable to Assess / Unwilling to Participate  I[]I A = Ambiguity Exists / Accuracy Uncertain  I[]I D = Denial or Minimization is Suspected/Evident  I[]I N/A = Non-Applicable    CHART REVIEW:     Available documentation has been reviewed, and pertinent elements of the chart have been incorporated into this evaluation where appropriate.    The patient's last Epic encounter in the psychiatry department was on: Visit date not found  The patient's first Epic encounter in the psychiatry department was on:      [place]/MS  [place]  Site reviewed - Potential discrepancies and irregularities are noted.      ADVICE AND COUNSELING:     [x] In cases of emergencies (e.g. SI/HI resulting in danger to self or others, functioning deteriorates to the level of grave disability), call 911 or 988, or present to the emergency department for immediate assistance.  [x] Individuals should not operate a motor vehicle or heavy machinery if effects of medications or underlying symptoms/condition impair the ability to safely do so.    Alcohol, Tobacco, and Drug Counseling, as well as resources, has been provided, as warranted.     Shared medical decision making and informed consent are the hallmark and bedrock of good clinical care, and as such have been employed and obtained, respectively, to the degree possible.      Risk Mitigation Strategies, Harm Reduction Techniques, and Safety Netting are important interventions that can reduce acute and chronic risk, and as such have been employed to the degree possible.    Prescription Drug Management entails the  review, recommendation, or consideration without recommendation of medications, and as such was employed during the encounter.    Additional Psychoeducation has been provided, as warranted.    Discussed, to the extent possible, diagnosis, risks and benefits of proposed treatment vs alternative treatments vs no treatment, potential side effects of these treatments and the inherent unpredictability of treatment. The patient currently lacks decision-making capacity.     Written material has been provided to supplement, augment, and reinforce any discussions and interventions, via the AVS or other pre-printed handouts, as warranted.      DIAGNOSTIC TESTING:     The chart was reviewed for recent diagnostic procedures and investigations, and pertinent results are noted below.     Glu 63 (L)  6/8/2024  Li *   *  TSH 1.637  6/7/2024    HgA1c 6.0 (H)  2/11/2024  VPA *   *   FT4 1.12  3/1/2024    Na 138  6/8/2024  CLZ *   *  WBC 20.82 (H)  6/8/2024    Cr 0.9  6/8/2024  ANC 5.0 (L)  6/8/2024   Hgb 8.9 (L)  6/8/2024     BUN 10  6/8/2024  Trop I <0.006  4/13/2024  HCT 29.1 (L)  6/8/2024     GFR >60.0  6/8/2024   CPK 75  5/17/2024  PLT 97 (L)  6/8/2024     Alb 3.7  6/8/2024   PRL *   *  B12 372  5/17/2024     T Bili 0.9  6/8/2024  Chol 184  4/6/2023  B9 15.9  5/17/2024    ALP 57  6/8/2024  TGs 161 (H)  4/6/2023  B1 114  *    AST 39  6/8/2024  HDL 44  4/6/2023  Vit D *   *     ALT 17  6/8/2024  .8  4/6/2023  HIV Non-reactive  4/11/2024     INR 1.0  3/2/2024  Baylee *   *   Hep C Non-reactive  4/11/2024    GGT *   *  Lip 38  6/7/2024  RPR *   *    MCV 88  6/8/2024   NH4 29  4/6/2023  UPT *   *      PETH 301  11/13/2023  THC Negative  6/7/2024    ETOH 242 (H)  6/7/2024  DESIREE Negative  6/7/2024    EtG Negative  5/21/2024  AMP Negative  6/7/2024    ALC <10  5/21/2024  OPI Negative  6/7/2024    BZO Negative  6/7/2024  MTD Negative  6/7/2024     BAR Negative  6/7/2024  BUP *   *     PCP Negative  6/7/2024  FEN Not Detected  7/18/2023     Results for orders placed or performed during the hospital encounter of 06/07/24   EKG 12-lead    Collection Time: 06/07/24  4:04 PM   Result Value Ref Range    QRS Duration 84 ms    OHS QTC Calculation 423 ms    Narrative    Test Reason : R00.0,    Vent. Rate : 084 BPM     Atrial Rate : 084 BPM     P-R Int : 156 ms          QRS Dur : 084 ms      QT Int : 358 ms       P-R-T Axes : 088 058 051 degrees     QTc Int : 423 ms    Normal sinus rhythm  Normal ECG  When compared with ECG of 07-JUN-2024 13:21,  No significant change was found  Confirmed by Natan BECERRA, Alejandro JACKSON ([id number]) on 6/8/2024 9:36:33 AM    Referred By: [email or web address]   SELF           Confirmed By:Alejandro Gama MD       Results for orders placed or performed during the hospital encounter of 03/01/24   CT Head Without Contrast    Narrative    EXAMINATION:  CT HEAD WITHOUT CONTRAST    CLINICAL HISTORY:  Neuro deficit, acute, stroke suspected;left UE weakness started 1 week ago. She had Afib RVR y'day. CT head result will guide me to start anticoagulant. Thank you;    TECHNIQUE:  Low dose axial CT images obtained throughout the head without intravenous contrast. Sagittal and coronal reconstructions were performed.    COMPARISON:  CT head from 02/16/2024.    FINDINGS:  Ventricles and sulci are normal in size for age without evidence of hydrocephalus. No extra-axial blood or fluid collections.  Mild chronic microvascular ischemic changes, stable.  No parenchymal mass, hemorrhage, edema or major vascular distribution infarct.    No calvarial fracture.  The scalp is unremarkable.  Bilateral paranasal sinuses and mastoid air cells are clear.      Impression    No acute intracranial abnormality.      Electronically signed by: Kaden Payne  Date:    03/03/2024  Time:    10:04   Results for orders placed or performed during the hospital encounter of 06/10/22   MRI Brain Without Contrast    Narrative     EXAMINATION:  MRI BRAIN WITHOUT CONTRAST    CLINICAL HISTORY:  hx of PRES;    TECHNIQUE:  Multiplanar multisequence MR imaging of the brain was performed without intravenous contrast.    COMPARISON:  Head CT 06/10/2022, brain MRI 10/04/2017, 07/21/2017    FINDINGS:  Intracranial Compartment:    Ventricles are normal in size for age without evidence of hydrocephalus.    Ill-defined focus T2-FLAIR signal hyperintensity in the right periatrial white matter.  Few additional scattered punctate foci elsewhere within the supratentorial white matter.  These appear similar to the prior study of 10/04/2017.  No definite new focal lesions.  No diffusion restriction to indicate an acute infarction.  No remote major vascular distribution infarct.  No recent or remote hemorrhage.  No mass effect or midline shift.    No extra-axial blood or fluid collections.    Normal vascular flow voids are preserved.    Skull/Extracranial Contents (limited evaluation):    Bone marrow signal intensity is normal.      Impression    No evidence of acute intracranial pathology.      Electronically signed by: Miguel Malagon MD  Date:    06/10/2022  Time:    09:45     *Note: Due to a large number of results and/or encounters for the requested time period, some results have not been displayed. A complete set of results can be found in Results Review.       CONSULTATION:     A diagnostic psychiatric evaluation was performed and responsiveness to treatment was assessed.  The patient demonstrates adequate ability/capacity to respond to treatment.    Inpatient consult to Psychiatry  Consult performed by: Roxann Salazar DO  Consult ordered by: Kvng Parsons MD

## 2024-06-08 NOTE — PROGRESS NOTES
[facility] Medicine  Progress Note    Patient Name: Earl Abdul  MRN: [id number]  Patient Class: IP- Inpatient   Admission Date: 6/7/2024  Length of Stay: 1 days  Attending Physician: Carlos Alanis III*  Primary Care Provider: Andrew Rodriguez MD        Subjective:     Principal Problem:Alcohol use disorder, severe, dependence      HPI:  Earl Abdul is a 64 year old woman with history of COPD, HTN, HLD, subclinical hypothyroidism, chronic lymphocytic leukemia, T2DM, Afib with RVR, fibromyalgia, and fatty liver disease, who presents with alcohol intoxication and nausea. Patient reports that she wants to detox, and reports that her last drink was 30 minutes prior to presentation. She drinks 1 bottle of liquor per day. She endorses multiple episodes of nonbloody nonbilious emesis, generalized mild abdominal discomfort. She expresses interest in going to rehab and wants to get clean from alcohol. She denies chest pain, shortness of breath, sore throat, fever, chills, polyuria, dysuria, polysubstance abuse. She endorses passive suicidality without a plan or intent, denies homicidal ideation, denies AVH. Previously admitted for EtOH withdrawal in April 2024, seen by Psychiatry and was started on naltrexone 50mg daily and lexapro was increased to 20mg daily. After detox, she went to residential rehab and completed 30 day program. Last seen in clinic by Psych on 5/20 where she was restarted on naltrexone 50 and continued on her prior psych medications.    In the ED, afebrile, tachycardic HR 100s, and hypertensive -170s. Found to be hypoxemic and placed on NC. Labs notable for WBC 62, Hgb 9.9, serum EtOH 242. Due to endorsed passive SI, PEC ordered. Received droperidol and zofran (from EMS) for nausea, and in the ED was given 1L IVF, 25mg benadryl, and 2 doses of diazepam based on CIWA scale. Patient admitted to hospital medicine for prevention and management of alcohol  withdrawal.    Overview/Hospital Course:  Admitted for management of alcohol withdrawal and DEVANTE. Valium taper and CIWA protocol ordered. Pt placed on PEC due to SI in ED. Psychiatry consulted and pending recs.     Interval History: Less tremors, decreasing Valium to TID with continued CIWA, pt denies SI or thought, still PECed    Review of Systems  Objective:     Vital Signs (Most Recent):  Temp: 98.9 °F (37.2 °C) (06/08/24 1112)  Pulse: 76 (06/08/24 1112)  Resp: 18 (06/08/24 1112)  BP: (!) 195/86 (06/08/24 1112)  SpO2: 95 % (06/08/24 1112) Vital Signs (24h Range):  Temp:  [98 °F (36.7 °C)-98.9 °F (37.2 °C)] 98.9 °F (37.2 °C)  Pulse:  [] 76  Resp:  [16-18] 18  SpO2:  [85 %-100 %] 95 %  BP: (167-195)/(78-98) 195/86     Weight: 77.1 kg (170 lb)  Body mass index is 27.44 kg/m².    Intake/Output Summary (Last 24 hours) at 6/8/2024 1430  Last data filed at 6/8/2024 0057  Gross per 24 hour   Intake --   Output 750 ml   Net -750 ml         Physical Exam  Constitutional:       Appearance: She is ill-appearing.      Comments: Generalized tremors   HENT:      Head: Normocephalic.      Mouth/Throat:      Mouth: Mucous membranes are dry.   Eyes:      Extraocular Movements: Extraocular movements intact.   Cardiovascular:      Rate and Rhythm: Regular rhythm. Tachycardia present.      Heart sounds: No murmur heard.  Pulmonary:      Effort: No respiratory distress.      Breath sounds: No wheezing.   Abdominal:      General: Abdomen is flat. There is no distension.      Tenderness: There is no abdominal tenderness.   Musculoskeletal:         General: No swelling or tenderness.      Cervical back: Normal range of motion.   Skin:     Coloration: Skin is not jaundiced.   Neurological:      Mental Status: She is oriented to person, place, and time.   Psychiatric:      Comments: Denies SI or thoughts             Significant Labs: All pertinent labs within the past 24 hours have been reviewed.  CBC:   Recent Labs   Lab  06/07/24  0807 06/07/24  1611 06/08/24  0229   WBC 62.08* 33.81* 20.82*   HGB 9.9* 9.9* 8.9*   HCT 34.3* 32.9* 29.1*   * 103* 97*     CMP:   Recent Labs   Lab 06/07/24  0807 06/08/24  0229    138   K 4.2 3.9   CL 98 101   CO2 20* 23   GLU 61* 63*   BUN 16 10   CREATININE 0.9 0.9   CALCIUM 8.6* 8.8   PROT 7.2 6.1   ALBUMIN 4.3 3.7   BILITOT 0.6 0.9   ALKPHOS 63 57   AST 47* 39   ALT 18 17   ANIONGAP 20* 14       Significant Imaging: I have reviewed all pertinent imaging results/findings within the past 24 hours.    Assessment/Plan:      * Alcohol use disorder, severe, dependence  64F with longstanding EtOH use disorder, EtOH withdrawals and seizures (2017), depression and hepatic steatosis. Pt with history of heavy drinking for past 14 years and numerous rehab attempts. Previously started on naltrexone during last admission for withdrawal in 04/2024. Follows with Psychiatry outpatient. Last drink 30 min prior to presentation, reports drinking daily.    Plan:  - Due to history of previous withdrawal seizures, will start PO Valium 10mg QID and taper daily as follows and as tolerated by patient:  -Day 1 Start PO diazepam 10mg QID  -Day 2 Valium 10 mg TID, less tremors and more comfortable  -Day 3 Valium 10 mg BID  -Day 4 Valium 5 mg BID  -Day 5 Valium 5 mg qd and then stop taper  - CIWA scale in place with symptom triggered ativan if CIWA > 8:  - Mild (CIWA 8-15): IV diazepam 5 mg q4h PRN  - Moderate (CIWA 16-25): IV diazepam 10 mg q2h for 3 doses  - Supplement Folate 1mg and Thiamine 500mg TID x3d  - Monitor for continued signs of withdrawal  - Daily CBC, CMP  - Boost TID added for nutrition  - Addiction psych consulted, appreciate assistance    Leukocytosis  WBC 62 on presentation. Hx of leukocytosis in past, as high as 48 in 2023. 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 Current leukocytosis could be reactive in setting of alcohol use vs infection vs her history of CLL.    -  "Peripheral smear and pathologist interpretation ordered.  - Consider infectious workup      Paroxysmal atrial fibrillation  History of pAF, on Eliquis 5 BID.    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   Currently in remission on no treatment  -WBC 62K, likely 2/2 to acute process vs withdrawal vs infection         Type 2 diabetes mellitus without complication, without long-term current use of insulin  Patient's FSGs are controlled on current medication regimen.  Last A1c reviewed-   Lab Results   Component Value Date    HGBA1C 6.0 (H) 02/11/2024     Most recent fingerstick glucose reviewed- No results for input(s): "POCTGLUCOSE" in the last 24 hours.  Current correctional scale  Low  Maintain anti-hyperglycemic dose as follows-   Antihyperglycemics (From admission, onward)      None          Hold Oral hypoglycemics while patient is in the hospital.    VINICIO (obstructive sleep apnea)  -CPAP at home      Depression with anxiety  -continued lexapro  -restarted bupropion    Tobacco abuse  -long term chronic smoker  -consider nicotine patches      Essential hypertension  Chronic, uncontrolled. Latest blood pressure and vitals reviewed-     Temp:  [98.9 °F (37.2 °C)]   Pulse:  []   Resp:  [20-51]   BP: (133-178)/()   SpO2:  [92 %-95 %] .   Home meds for hypertension were reviewed and noted below.   Hypertension Medications               losartan (COZAAR) 25 MG tablet Take 25 mg by mouth once daily.    metoprolol succinate (TOPROL-XL) 50 MG 24 hr tablet Take 1 tablet (50 mg total) by mouth once daily.        -continued home meds  -likely related to Etoh withdrawal    While in the hospital, will manage blood pressure as follows; Continue home antihypertensive regimen    Will utilize p.r.n. blood pressure medication only if patient's blood pressure greater than " 180/110 and she develops symptoms such as worsening chest pain or shortness of breath.    Alcohol withdrawal  See alcohol use disorder.        VTE Risk Mitigation (From admission, onward)           Ordered     apixaban tablet 5 mg  2 times daily         06/07/24 1336     IP VTE HIGH RISK PATIENT  Once         06/07/24 1224     Place sequential compression device  Until discontinued         06/07/24 1224                    Discharge Planning   BECCA: 6/9/2024     Code Status: Full Code   Is the patient medically ready for discharge?:     Reason for patient still in hospital (select all that apply): Treatment  Discharge Plan A: Psychiatric [facility]            Juan Luis Allen MD  Department of Hospital Medicine   [facility] Surg

## 2024-06-08 NOTE — HOSPITAL COURSE
Admitted for management of alcohol withdrawal and DEVANTE. Valium taper and CIWA protocol ordered. Pt placed on PEC due to SI in ED. Psychiatry consulted and removed PEC. Recommended alcohol cessation and rehab program.  No need for inpatient psych admission.  Psych recs to continue depression, anxiety, and withdrawal medications.  Patient without worsening tremors, no hallucinations, and denying suicidal ideations or thoughts.  Patient out of the 72 hr window for DTs but to be prescribed a short course of Valium taper to go home with.  Patient to hold the Wellbutrin for a week until follow-up due to lowering the seizure threshold with alcohol withdrawal. Patient and  plan to pursue rehab facility.  Recommend follow up with PCP and psych outpatient patient clinically stable for discharge.

## 2024-06-08 NOTE — PROVIDER PROGRESS NOTES - EMERGENCY DEPT.
Encounter Date: 6/7/2024    ED Physician Progress Notes            Critical Care    Date/Time: 6/7/2024 9:00 AM    Performed by: Grant Gudino MD  Authorized by: Grant Gudino MD  Total critical care time (exclusive of procedural time) : 30 minutes  Critical care was necessary to treat or prevent imminent or life-threatening deterioration of the following conditions: alcohol withdrawal, acute agitation.

## 2024-06-09 VITALS
HEART RATE: 86 BPM | BODY MASS INDEX: 27.32 KG/M2 | SYSTOLIC BLOOD PRESSURE: 149 MMHG | TEMPERATURE: 98 F | OXYGEN SATURATION: 92 % | WEIGHT: 170 LBS | HEIGHT: 66 IN | DIASTOLIC BLOOD PRESSURE: 81 MMHG | RESPIRATION RATE: 18 BRPM

## 2024-06-09 LAB
ALBUMIN SERPL BCP-MCNC: 3.9 G/DL (ref 3.5–5.2)
ALP SERPL-CCNC: 59 U/L (ref 55–135)
ALT SERPL W/O P-5'-P-CCNC: 21 U/L (ref 10–44)
ANION GAP SERPL CALC-SCNC: 10 MMOL/L (ref 8–16)
ANISOCYTOSIS BLD QL SMEAR: SLIGHT
AST SERPL-CCNC: 33 U/L (ref 10–40)
BASOPHILS NFR BLD: 0 % (ref 0–1.9)
BILIRUB SERPL-MCNC: 0.6 MG/DL (ref 0.1–1)
BUN SERPL-MCNC: 5 MG/DL (ref 8–23)
CALCIUM SERPL-MCNC: 9.5 MG/DL (ref 8.7–10.5)
CHLORIDE SERPL-SCNC: 102 MMOL/L (ref 95–110)
CO2 SERPL-SCNC: 29 MMOL/L (ref 23–29)
CREAT SERPL-MCNC: 0.8 MG/DL (ref 0.5–1.4)
DIFFERENTIAL METHOD BLD: ABNORMAL
EOSINOPHIL NFR BLD: 0 % (ref 0–8)
ERYTHROCYTE [DISTWIDTH] IN BLOOD BY AUTOMATED COUNT: 20.6 % (ref 11.5–14.5)
EST. GFR  (NO RACE VARIABLE): >60 ML/MIN/1.73 M^2
ESTIMATED AVG GLUCOSE: 123 MG/DL (ref 68–131)
GLUCOSE SERPL-MCNC: 106 MG/DL (ref 70–110)
HBA1C MFR BLD: 5.9 % (ref 4–5.6)
HCT VFR BLD AUTO: 32.9 % (ref 37–48.5)
HGB BLD-MCNC: 10.1 G/DL (ref 12–16)
HYPOCHROMIA BLD QL SMEAR: ABNORMAL
IMM GRANULOCYTES # BLD AUTO: ABNORMAL K/UL (ref 0–0.04)
IMM GRANULOCYTES NFR BLD AUTO: ABNORMAL % (ref 0–0.5)
LYMPHOCYTES NFR BLD: 79 % (ref 18–48)
MAGNESIUM SERPL-MCNC: 1.7 MG/DL (ref 1.6–2.6)
MCH RBC QN AUTO: 26.9 PG (ref 27–31)
MCHC RBC AUTO-ENTMCNC: 30.7 G/DL (ref 32–36)
MCV RBC AUTO: 88 FL (ref 82–98)
MONOCYTES NFR BLD: 3 % (ref 4–15)
NEUTROPHILS NFR BLD: 18 % (ref 38–73)
NRBC BLD-RTO: 0 /100 WBC
PHOSPHATE SERPL-MCNC: 2.7 MG/DL (ref 2.7–4.5)
PLATELET # BLD AUTO: 93 K/UL (ref 150–450)
PLATELET BLD QL SMEAR: ABNORMAL
PMV BLD AUTO: 9.8 FL (ref 9.2–12.9)
POCT GLUCOSE: 118 MG/DL (ref 70–110)
POCT GLUCOSE: 150 MG/DL (ref 70–110)
POCT GLUCOSE: 203 MG/DL (ref 70–110)
POIKILOCYTOSIS BLD QL SMEAR: SLIGHT
POLYCHROMASIA BLD QL SMEAR: ABNORMAL
POTASSIUM SERPL-SCNC: 3.3 MMOL/L (ref 3.5–5.1)
PROT SERPL-MCNC: 6.5 G/DL (ref 6–8.4)
RBC # BLD AUTO: 3.75 M/UL (ref 4–5.4)
SMUDGE CELLS BLD QL SMEAR: PRESENT
SODIUM SERPL-SCNC: 141 MMOL/L (ref 136–145)
SPHEROCYTES BLD QL SMEAR: ABNORMAL
WBC # BLD AUTO: 18.17 K/UL (ref 3.9–12.7)

## 2024-06-09 PROCEDURE — 85007 BL SMEAR W/DIFF WBC COUNT: CPT

## 2024-06-09 PROCEDURE — 63600175 PHARM REV CODE 636 W HCPCS: Performed by: STUDENT IN AN ORGANIZED HEALTH CARE EDUCATION/TRAINING PROGRAM

## 2024-06-09 PROCEDURE — 80053 COMPREHEN METABOLIC PANEL: CPT

## 2024-06-09 PROCEDURE — 25000003 PHARM REV CODE 250

## 2024-06-09 PROCEDURE — 84100 ASSAY OF PHOSPHORUS: CPT

## 2024-06-09 PROCEDURE — 36415 COLL VENOUS BLD VENIPUNCTURE: CPT

## 2024-06-09 PROCEDURE — 83735 ASSAY OF MAGNESIUM: CPT

## 2024-06-09 PROCEDURE — 63600175 PHARM REV CODE 636 W HCPCS: Performed by: FAMILY MEDICINE

## 2024-06-09 PROCEDURE — 85027 COMPLETE CBC AUTOMATED: CPT

## 2024-06-09 PROCEDURE — 83036 HEMOGLOBIN GLYCOSYLATED A1C: CPT | Performed by: FAMILY MEDICINE

## 2024-06-09 PROCEDURE — 63600175 PHARM REV CODE 636 W HCPCS

## 2024-06-09 RX ORDER — BUPROPION HYDROCHLORIDE 200 MG/1
200 TABLET, EXTENDED RELEASE ORAL DAILY
Qty: 30 TABLET | Refills: 0 | Status: SHIPPED | OUTPATIENT
Start: 2024-06-09

## 2024-06-09 RX ORDER — DIAZEPAM 10 MG/1
TABLET ORAL
Qty: 7 TABLET | Refills: 0 | Status: SHIPPED | OUTPATIENT
Start: 2024-06-09 | End: 2024-06-13

## 2024-06-09 RX ORDER — OLANZAPINE 7.5 MG/1
7.5 TABLET ORAL NIGHTLY
Qty: 30 TABLET | Refills: 0 | Status: SHIPPED | OUTPATIENT
Start: 2024-06-09 | End: 2024-07-09

## 2024-06-09 RX ORDER — TALC
9 POWDER (GRAM) TOPICAL NIGHTLY PRN
Status: DISCONTINUED | OUTPATIENT
Start: 2024-06-09 | End: 2024-06-09 | Stop reason: HOSPADM

## 2024-06-09 RX ORDER — TALC
9 POWDER (GRAM) TOPICAL NIGHTLY PRN
Status: DISCONTINUED | OUTPATIENT
Start: 2024-06-09 | End: 2024-06-09

## 2024-06-09 RX ORDER — ESCITALOPRAM OXALATE 20 MG/1
20 TABLET ORAL DAILY
Qty: 30 TABLET | Refills: 0 | Status: SHIPPED | OUTPATIENT
Start: 2024-06-09 | End: 2024-07-09

## 2024-06-09 RX ORDER — SODIUM CHLORIDE, SODIUM LACTATE, POTASSIUM CHLORIDE, CALCIUM CHLORIDE 600; 310; 30; 20 MG/100ML; MG/100ML; MG/100ML; MG/100ML
INJECTION, SOLUTION INTRAVENOUS CONTINUOUS
Status: DISCONTINUED | OUTPATIENT
Start: 2024-06-09 | End: 2024-06-09 | Stop reason: HOSPADM

## 2024-06-09 RX ORDER — TRAZODONE HYDROCHLORIDE 100 MG/1
200 TABLET ORAL NIGHTLY
Qty: 60 TABLET | Refills: 0 | Status: SHIPPED | OUTPATIENT
Start: 2024-06-09 | End: 2024-07-09

## 2024-06-09 RX ORDER — POTASSIUM CHLORIDE 20 MEQ/1
40 TABLET, EXTENDED RELEASE ORAL ONCE
Status: COMPLETED | OUTPATIENT
Start: 2024-06-09 | End: 2024-06-09

## 2024-06-09 RX ORDER — NALTREXONE HYDROCHLORIDE 50 MG/1
50 TABLET, FILM COATED ORAL DAILY
Qty: 30 TABLET | Refills: 0 | Status: SHIPPED | OUTPATIENT
Start: 2024-06-09 | End: 2024-07-09

## 2024-06-09 RX ORDER — NIFEDIPINE 30 MG/1
30 TABLET, EXTENDED RELEASE ORAL DAILY
Status: DISCONTINUED | OUTPATIENT
Start: 2024-06-09 | End: 2024-06-09 | Stop reason: HOSPADM

## 2024-06-09 RX ORDER — BUPROPION HYDROCHLORIDE 200 MG/1
200 TABLET, EXTENDED RELEASE ORAL DAILY
Start: 2024-06-09 | End: 2024-06-09

## 2024-06-09 RX ADMIN — METOPROLOL SUCCINATE 50 MG: 50 TABLET, EXTENDED RELEASE ORAL at 08:06

## 2024-06-09 RX ADMIN — NIFEDIPINE 30 MG: 30 TABLET, FILM COATED, EXTENDED RELEASE ORAL at 08:06

## 2024-06-09 RX ADMIN — ONDANSETRON 4 MG: 2 INJECTION INTRAMUSCULAR; INTRAVENOUS at 09:06

## 2024-06-09 RX ADMIN — DIAZEPAM 10 MG: 5 TABLET ORAL at 08:06

## 2024-06-09 RX ADMIN — LEVOTHYROXINE SODIUM 75 MCG: 75 TABLET ORAL at 05:06

## 2024-06-09 RX ADMIN — ACETAMINOPHEN 650 MG: 325 TABLET ORAL at 08:06

## 2024-06-09 RX ADMIN — DIAZEPAM 5 MG: 10 INJECTION, SOLUTION INTRAMUSCULAR; INTRAVENOUS at 03:06

## 2024-06-09 RX ADMIN — ESCITALOPRAM OXALATE 20 MG: 5 TABLET, FILM COATED ORAL at 08:06

## 2024-06-09 RX ADMIN — APIXABAN 5 MG: 5 TABLET, FILM COATED ORAL at 08:06

## 2024-06-09 RX ADMIN — POTASSIUM CHLORIDE 40 MEQ: 1500 TABLET, EXTENDED RELEASE ORAL at 11:06

## 2024-06-09 RX ADMIN — FOLIC ACID 1 MG: 5 INJECTION, SOLUTION INTRAMUSCULAR; INTRAVENOUS; SUBCUTANEOUS at 09:06

## 2024-06-09 RX ADMIN — THIAMINE HYDROCHLORIDE 500 MG: 100 INJECTION, SOLUTION INTRAMUSCULAR; INTRAVENOUS at 08:06

## 2024-06-09 RX ADMIN — LOSARTAN POTASSIUM 100 MG: 50 TABLET, FILM COATED ORAL at 08:06

## 2024-06-09 RX ADMIN — SODIUM CHLORIDE, POTASSIUM CHLORIDE, SODIUM LACTATE AND CALCIUM CHLORIDE: 600; 310; 30; 20 INJECTION, SOLUTION INTRAVENOUS at 05:06

## 2024-06-09 NOTE — PROGRESS NOTES
Patient is currently resting in bed with no complaints. Call light within reach. Door open for close observation.

## 2024-06-09 NOTE — PLAN OF CARE
[facility]  Discharge Final Note    Primary Care Provider: Andrew Rodriguez MD    Expected Discharge Date: 6/9/2024    Final Discharge Note (most recent)       Final Note - 06/09/24 1423          Final Note    Assessment Type Final Discharge Note     Anticipated Discharge Disposition Home or Self Care     Hospital Resources/Appts/Education Provided Community resources provided        Post-Acute Status    Post-Acute Authorization Other     Other Status No Post-Acute Service Needs     Discharge Delays Patient and Family Barriers                     Important Message from Medicare             Contact Info       Andrew Rodriguez MD   Specialty: Internal Medicine   Relationship: PCP - General    [facility]  [id number]  [facility] [address]   Phone: [phone]       Next Steps: Follow up in 1 week(s)    [facility]   Specialty: Psychiatry    [address], [address]  [place] [address]       Next Steps: Follow up in 1 month(s)

## 2024-06-09 NOTE — CARE UPDATE
DO on call paged. Concerns of agitation, restless legs, insomnia. Pt undergoing ETOH withdrawal on valium taper.    DO to bedside. CIWA 4. Patient resting comfortably in bed. Has already received scheduled meds and PRNS. RN reports giving the patient additional melatonin earlier in the night beyond what was prescribed. RN was educated about important of adhering to order, especially pharmacotherapy with dosages and scheduling. No indication for new orders at this time.     DO on call paged again about confusion regarding CIWA q8 score and PRN q4. RN had already performed CIWA and reported it as 8+ but was unclear if PRN ativan was indicated. CIWA order updated to q4    Without physician order, RN placed a note on pt door to not to be disturbed before 0700. Patient on CIWA, has 0400a labs due, early AM meds due that this sign would interfere with.   DO on call removed sign, educated RN on why this would be a bad idea. Charge aware.   
Marilee Sam

## 2024-06-09 NOTE — PLAN OF CARE
Problem: Adult Inpatient Plan of Care  Goal: Plan of Care Review  Outcome: Progressing  Goal: Patient-Specific Goal (Individualized)  Outcome: Progressing  Goal: Optimal Comfort and Wellbeing  Outcome: Progressing  Goal: Readiness for Transition of Care  Outcome: Progressing     Problem: Diabetes Comorbidity  Goal: Blood Glucose Level Within Targeted Range  Outcome: Progressing     Problem: Infection  Goal: Absence of Infection Signs and Symptoms  Outcome: Progressing

## 2024-06-09 NOTE — DISCHARGE SUMMARY
[facility]  Discharge Summary      Patient Name: Earl Abdul  MRN: [id number]  IZA: [id number]  Patient Class: IP- Inpatient  Admission Date: 6/7/2024  Hospital Length of Stay: 2 days  Discharge Date and Time:  06/09/2024 1:06 PM  Attending Physician: Carlos Alanis III*   Discharging Provider: Juan Luis Allen MD  Primary Care Provider: Andrew Rodriguez MD  [facility] Medicine Team: [facility] 3 Juan Luis Allen MD  Primary Care Team: [place] 3    HPI:   Earl Abdul is a 64 year old woman with history of COPD, HTN, HLD, subclinical hypothyroidism, chronic lymphocytic leukemia, T2DM, Afib with RVR, fibromyalgia, and fatty liver disease, who presents with alcohol intoxication and nausea. Patient reports that she wants to detox, and reports that her last drink was 30 minutes prior to presentation. She drinks 1 bottle of liquor per day. She endorses multiple episodes of nonbloody nonbilious emesis, generalized mild abdominal discomfort. She expresses interest in going to rehab and wants to get clean from alcohol. She denies chest pain, shortness of breath, sore throat, fever, chills, polyuria, dysuria, polysubstance abuse. She endorses passive suicidality without a plan or intent, denies homicidal ideation, denies AVH. Previously admitted for EtOH withdrawal in April 2024, seen by Psychiatry and was started on naltrexone 50mg daily and lexapro was increased to 20mg daily. After detox, she went to residential rehab and completed 30 day program. Last seen in clinic by Psych on 5/20 where she was restarted on naltrexone 50 and continued on her prior psych medications.    In the ED, afebrile, tachycardic HR 100s, and hypertensive -170s. Found to be hypoxemic and placed on NC. Labs notable for WBC 62, Hgb 9.9, serum EtOH 242. Due to endorsed passive SI, PEC ordered. Received droperidol and zofran (from EMS) for nausea, and in the ED was given 1L IVF, 25mg benadryl, and 2  doses of diazepam based on CIWA scale. Patient admitted to hospital medicine for prevention and management of alcohol withdrawal.    * No surgery found *      Hospital Course:   Admitted for management of alcohol withdrawal and DEVANTE. Valium taper and CIWA protocol ordered. Pt placed on PEC due to SI in ED. Psychiatry consulted and removed PEC. Recommended alcohol cessation and rehab program.  No need for inpatient psych admission.  Psych recs to continue depression, anxiety, and withdrawal medications.  Patient without worsening tremors, no hallucinations, and denying suicidal ideations or thoughts.  Patient out of the 72 hr window for DTs but to be prescribed a short course of Valium taper to go home with.  Patient to hold the Wellbutrin for a week until follow-up due to lowering the seizure threshold with alcohol withdrawal. Patient and  plan to pursue rehab facility.  Recommend follow up with PCP and psych outpatient patient clinically stable for discharge.     Vitals:    06/09/24 0746 06/09/24 0843 06/09/24 0844 06/09/24 1125   BP: (!) 164/79 (!) 164/79 (!) 164/79 (!) 149/81   BP Location:       Patient Position:       Pulse: 97   86   Resp: 18   18   Temp: 98.5 °F (36.9 °C)   98.2 °F (36.8 °C)   TempSrc: Oral   Oral   SpO2: (!) 89%   (!) 92%   Weight:       Height:         Physical Exam  Constitutional:       Appearance: She is obese.    HENT:      Head: Normocephalic.      Mouth/Throat:      Mouth: Mucous membranes are dry.   Eyes:      Extraocular Movements: Extraocular movements intact.   Cardiovascular:      Rate and Rhythm: Regular rhythm present.      Heart sounds: No murmur heard.  Pulmonary:      Effort: No respiratory distress.      Breath sounds: No wheezing.   Abdominal:      General: Abdomen is flat. There is no distension.      Tenderness: There is no abdominal tenderness.   Musculoskeletal:         General: No swelling or tenderness.      Cervical back: Normal range of motion.   Skin:      Coloration: Skin is not jaundiced.   Neurological:      Mental Status: She is oriented to person, place, and time.   Psychiatric:      Comments: Denies SI or thoughts     Goals of Care Treatment Preferences:  Code Status: Full Code    Health care agent: Spouse is KINA  Health care agent number: No value filed.               Consults:   Consults (From admission, onward)          Status Ordering Provider     Inpatient consult to Midline team  Once        Provider:  (Not yet assigned)    Completed KOTA BAKER III     Inpatient consult to Psychiatry  Once        Provider:  (Not yet assigned)    Completed DO HAMEED            No new Assessment & Plan notes have been filed under this hospital service since the last note was generated.  Service: Hospital Medicine    Final Active Diagnoses:    Diagnosis Date Noted POA    PRINCIPAL PROBLEM:  Alcohol use disorder, severe, dependence [F10.20] 02/07/2014 Yes    Leukocytosis [D72.829] 04/07/2024 Yes    Paroxysmal atrial fibrillation [I48.0] 03/01/2024 Yes    Type 2 diabetes mellitus without complication, without long-term current use of insulin [E11.9] 11/30/2021 Yes    VINICIO (obstructive sleep apnea) [G47.33]  Yes    Depression with anxiety [F41.8] 08/16/2017 Yes    Alcohol withdrawal [F10.939] 02/07/2014 Yes    Essential hypertension [I10] 02/07/2014 Yes     Chronic    Tobacco abuse [Z72.0] 02/07/2014 Yes     Chronic      Problems Resolved During this Admission:       Discharged Condition: stable    Disposition:     Follow Up:    Patient Instructions:   No discharge procedures on file.    Significant Diagnostic Studies: N/A    Pending Diagnostic Studies:       None           Medications:  Reconciled Home Medications:      Medication List        START taking these medications      diazePAM 10 MG Tab  Commonly known as: VALIUM  Take 1 tablet (10 mg total) by mouth 3 (three) times daily for 1 day, THEN 1 tablet (10 mg total) 2 (two) times a day for 1 day, THEN 1 tablet  (10 mg total) once daily for 1 day, THEN 1 tablet (10 mg total) once daily for 1 day.  Start taking on: June 9, 2024            CHANGE how you take these medications      buPROPion 200 MG Sr12  Commonly known as: WELLBUTRIN SR  Take 1 tablet (200 mg total) by mouth once daily. Please hold for 2 weeks before resuming  What changed: additional instructions     gabapentin 300 MG capsule  Commonly known as: NEURONTIN  Take 1 capsule (300 mg total) by mouth 3 (three) times daily.  What changed: how much to take            CONTINUE taking these medications      ACCU-CHEK SOFTCLIX LANCETS Misc  Generic drug: lancets  Use to check blood glucose 4 times daily     ELIQUIS 5 mg Tab  Generic drug: apixaban  Take 1 tablet (5 mg total) by mouth 2 (two) times daily.     EScitalopram oxalate 20 MG tablet  Commonly known as: LEXAPRO  Take 1 tablet (20 mg total) by mouth once daily.     folic acid 1 MG tablet  Commonly known as: FOLVITE  Take 1 tablet (1 mg total) by mouth once daily.     ibuprofen 400 MG tablet  Commonly known as: ADVIL,MOTRIN  Take 400 mg by mouth 3 (three) times daily.     levothyroxine 75 MCG tablet  Commonly known as: SYNTHROID  Take 1 tablet (75 mcg total) by mouth before breakfast.     losartan 25 MG tablet  Commonly known as: COZAAR  Take 25 mg by mouth once daily.     metFORMIN 500 MG ER 24hr tablet  Commonly known as: GLUCOPHAGE-XR  Take 500 mg by mouth 2 (two) times daily with meals.     metoprolol succinate 50 MG 24 hr tablet  Commonly known as: TOPROL-XL  Take 1 tablet (50 mg total) by mouth once daily.     multivitamin Tab  Take 1 tablet by mouth once daily.     naltrexone 50 mg tablet  Commonly known as: DEPADE  Take 1 tablet (50 mg total) by mouth once daily.     OLANZapine 7.5 MG tablet  Commonly known as: ZyPREXA  Take 7.5 mg by mouth every evening.     omeprazole 20 MG capsule  Commonly known as: PRILOSEC  Take 1 capsule (20 mg total) by mouth once daily.     ondansetron 4 MG tablet  Commonly known  as: ZOFRAN  Take 4 mg by mouth daily as needed for Nausea.     predniSONE 20 MG tablet  Commonly known as: DELTASONE  Take 20 mg by mouth once daily.     traZODone 100 MG tablet  Commonly known as: DESYREL  Take 2 tablets (200 mg total) by mouth every evening.              Indwelling Lines/Drains at time of discharge:   Lines/Drains/Airways       None                   Time spent on the discharge of patient: 35 minutes         Juan Luis Allen MD  Department of Hospital Medicine  [facility]

## 2024-06-09 NOTE — PROGRESS NOTES
Reported previously to Med Team 3 that patient complained of restlessness, anxiety, and agitation. Patient's CIWA was at 16, even though CIWA was ordered every 8 hours. PRN med could be given every 4 hours. PRN med could only be given for CIWA >8. New order received. Patient given her PRN Diazepam. Patient currently resting quietly with no complaints.

## 2024-06-09 NOTE — PROGRESS NOTES
Reported to Med Team 3, SARAH Carranza, per patient complained of restlessness-mainly restless legs and agitation. No new orders at this time. In addition to the current complaints, patient had previously called out 12 times since change of shift for complaints of tangling herself into her IV lines, Boudreaux catheter tubing, and Telemetry monitor tubing. Staff has repeatedly attempted to redirect patient with little success. Patient's  had visited previously, gave patient her cell phone, and left to go home for the night.

## 2024-06-10 ENCOUNTER — PATIENT OUTREACH (OUTPATIENT)
Dept: ADMINISTRATIVE | Facility: CLINIC | Age: 64
End: 2024-06-10
Payer: COMMERCIAL

## 2024-06-10 LAB — PATH REV BLD -IMP: NORMAL

## 2024-06-12 DIAGNOSIS — E11.9 TYPE 2 DIABETES MELLITUS WITHOUT COMPLICATION, UNSPECIFIED WHETHER LONG TERM INSULIN USE: ICD-10-CM

## 2024-06-14 NOTE — CARE UPDATE
RAPID RESPONSE NURSE PROACTIVE ROUNDING NOTE       Time of Visit: 0900    Admit Date: 2023  LOS: 3  Code Status: Full Code   Date of Visit: 2023  : 1960  Age: 62 y.o.  Sex: female  Race: White  Bed: 626/626 A:   MRN: [id number]  Was the patient discharged from an ICU this admission? No   Was the patient discharged from a PACU within last 24 hours? No   Did the patient receive conscious sedation/general anesthesia in last 24 hours? No  Was the patient in the ED within the past 24 hours? No  Was the patient on NIPPV within the past 24 hours? No   Attending Physician: Susana Moreno MD  Primary Service: [facility]   Time spent at the bedside: < 15 min    SITUATION    Notified by charge RN during rounding.  Reason for alert: SPO2 alarm  Called to evaluate the patient for Respiratory    BACKGROUND     Why is the patient in the hospital?: Alcohol withdrawal    Patient has a past medical history of Anemia, Anemia, Controlled type 2 diabetes mellitus without complication, without long-term current use of insulin, COPD (chronic obstructive pulmonary disease), Depression, Diverticulitis, Fatty liver, GERD (gastroesophageal reflux disease), Hyperlipidemia, Hypertension, Pancreatitis, Peptic ulcer disease, Polysubstance abuse, Posterior reversible encephalopathy syndrome, Sarcoidosis of lung, Sarcoidosis of lung, Seizures, Suicide attempt, and Suicide ideation.    Last Vitals:  Temp: 98.3 °F (36.8 °C) ( 09)  Pulse: 91 ( 08)  Resp: 18 ( 08)  BP: 133/60 ( 0842)  SpO2: 90 % ( 08)    24 Hours Vitals Range:  Temp:  [97.1 °F (36.2 °C)-100.2 °F (37.9 °C)]   Pulse:  []   Resp:  [15-23]   BP: (133-162)/(60-91)   SpO2:  [90 %-98 %]     Labs:  Recent Labs     23  1427 23  0507   WBC 4.21 3.96   HGB 9.7* 9.7*   HCT 32.4* 32.1*   PLT 59* 63*       Recent Labs     23  1120 23  0507   * 138   K 4.0 3.2*   CL 97 97   CO2 24 32*   CREATININE 0.7 0.6   GLU  93 129*   PHOS 1.8* 2.5*   MG 1.9 1.7        No results for input(s): PH, PCO2, PO2, HCO3, POCSATURATED, BE in the last 72 hours.     ASSESSMENT    Physical Exam  Vitals and nursing note reviewed.   Constitutional:       Appearance: Normal appearance.   Cardiovascular:      Rate and Rhythm: Normal rate.   Neurological:      General: No focal deficit present.      Mental Status: She is alert.      GCS: GCS eye subscore is 3. GCS verbal subscore is 5. GCS motor subscore is 6.   Patient sleeping without nasal cannula in and once NC was placed back on patient SPO2 went to 91%    INTERVENTIONS    The patient was seen for Respiratory problem. Staff concerns included hypoxia. The following interventions were performed: supplemental oxygen.    RECOMMENDATIONS    -Tobacco and nicotine education  -maintain pulse ox of 90% with supplemental O2  -maintain IV access    PROVIDER ESCALATION    Yes/No  No    Orders received and case discussed with NA.    Disposition: Remain in room 626.    FOLLOW-UP    charge Inga TAYLOR  updated on plan of care. Instructed to call the Rapid Response Nurse, Andrew Gatica RN at [phone] for additional questions or concerns.             Spironolactone Pregnancy And Lactation Text: This medication can cause feminization of the male fetus and should be avoided during pregnancy. The active metabolite is also found in breast milk.

## 2024-06-17 NOTE — PROGRESS NOTES
"Progress Note  [facility] Medicine    Admit Date: 4/11/2024    SUBJECTIVE:     Follow-up For: Alcohol withdrawal    HPI/Interval history (See H&P for complete P,F,SHx) : Doing better plan is to dc to rehab unit. PEC in place.    Review of Systems:  Constitutional- Negative for Fever, Weakness  Neuro- Negative for Confusion  CV- Negative for Chest Pain, Palpitations  Resp- Negative for Cough, SOB  GI- Negative for Nausea, Vomiting, Diarrhea  Extrem- Negative for Pain, Swelling    OBJECTIVE:   No intake or output data in the 24 hours ending 06/17/24 1345    Vital Signs Range (Last 24H): VSS       Physical Exam:  General: Well developed, well nourished in NAD  HEENT: Conjunctiva clear; Oropharynx clear  Neck: No JVD noted, Supple  CV- Normal S1, S2 with no murmurs,gallops,rubs  Resp- Lungs CTA Bilaterally, Unlabored  Abdomen- NTND, BS normoactive x4 quads, soft  Extrem- No cyanosis, clubbing, edema.  Skin- No rashes, lesions, ulcers  PSYCH: Oriented x3    Medications:  Medication list was reviewed and changes noted under Assessment/Plan.    Laboratory:  No results for input(s): "WBC", "RBC", "HGB", "HCT", "PLT", "MCV", "MCH", "MCHC" in the last 72 hours.    No results for input(s): "GLU", "NA", "K", "CL", "CO2", "BUN", "CREATININE", "MG", "PHOS" in the last 72 hours.    Invalid input(s): "CA"      ASSESSMENT/PLAN:     Patient Active Problem List   Diagnosis    Alcohol use disorder, severe, dependence    Alcohol withdrawal    Essential hypertension    Fibromyalgia    Tobacco abuse    Cannabis abuse, in remission    Sarcoidosis    History of Clostridium difficile colitis    Diarrhea    Dehydration    Nausea    Pyelonephritis due to Escherichia coli    Hypophosphatemia    Hypomagnesemia    New onset seizure    Posterior reversible encephalopathy syndrome    Depression with anxiety    Erythema nodosum    History of sarcoidosis    Hyperlipidemia    Ramírez's syndrome    Degenerative joint disease (DJD) of lumbar spine    " Decreased ROM of lumbar spine    Difficulty walking    Thrombocytopenia    Hypokalemia    VINICIO (obstructive sleep apnea)    At risk for lymphedema    Malignant neoplasm of central portion of right breast in female, estrogen receptor positive    COPD (chronic obstructive pulmonary disease)    Incontinence of feces    Low serum vitamin B12    Anemia    Urge incontinence of urine    Hypothyroidism    Type 2 diabetes mellitus without complication, without long-term current use of insulin    Obesity (BMI 30.0-34.9)    Fecal incontinence    Mixed incontinence    Pelvic floor tension    COVID-19    Hypoxia    Shortness of breath    Starvation ketoacidosis    E coli bacteremia    Lymphocytosis    Migraine without aura and with status migrainosus, not intractable    OAB (overactive bladder)    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    Refeeding syndrome    CLL (chronic lymphocytic leukemia)    Migraine    Esophagitis    Leg weakness, bilateral    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

## 2024-07-19 ENCOUNTER — PATIENT MESSAGE (OUTPATIENT)
Dept: INTERNAL MEDICINE | Facility: CLINIC | Age: 64
End: 2024-07-19
Payer: COMMERCIAL

## 2024-07-19 DIAGNOSIS — F03.90 DEMENTIA, UNSPECIFIED DEMENTIA SEVERITY, UNSPECIFIED DEMENTIA TYPE, UNSPECIFIED WHETHER BEHAVIORAL, PSYCHOTIC, OR MOOD DISTURBANCE OR ANXIETY: Primary | ICD-10-CM

## 2024-08-05 ENCOUNTER — HOSPITAL ENCOUNTER (INPATIENT)
Facility: HOSPITAL | Age: 64
LOS: 3 days | Discharge: HOME | DRG: 897 | End: 2024-08-08
Attending: EMERGENCY MEDICINE | Admitting: HOSPITALIST
Payer: COMMERCIAL

## 2024-08-05 DIAGNOSIS — R06.89 HYPERCAPNIA: ICD-10-CM

## 2024-08-05 DIAGNOSIS — F10.151 ALCOHOL ABUSE WITH ALCOHOL-INDUCED PSYCHOTIC DISORDER WITH HALLUCINATIONS: ICD-10-CM

## 2024-08-05 DIAGNOSIS — I95.9 HYPOTENSION: ICD-10-CM

## 2024-08-05 DIAGNOSIS — I70.0 AORTIC ATHEROSCLEROSIS: ICD-10-CM

## 2024-08-05 DIAGNOSIS — F10.10 ETOH ABUSE: ICD-10-CM

## 2024-08-05 DIAGNOSIS — F10.939 ALCOHOL WITHDRAWAL SYNDROME WITH COMPLICATION: ICD-10-CM

## 2024-08-05 DIAGNOSIS — R40.0 SOMNOLENCE: ICD-10-CM

## 2024-08-05 DIAGNOSIS — E86.1 HYPOTENSION DUE TO HYPOVOLEMIA: Primary | ICD-10-CM

## 2024-08-05 DIAGNOSIS — Z86.69 HISTORY OF OBSTRUCTIVE SLEEP APNEA: ICD-10-CM

## 2024-08-05 DIAGNOSIS — F10.21 HISTORY OF ALCOHOLISM: ICD-10-CM

## 2024-08-05 DIAGNOSIS — F41.8 DEPRESSION WITH ANXIETY: ICD-10-CM

## 2024-08-05 DIAGNOSIS — E16.2 HYPOGLYCEMIA: ICD-10-CM

## 2024-08-05 DIAGNOSIS — F10.27 DEMENTIA ASSOCIATED WITH ALCOHOLISM, UNSPECIFIED DEMENTIA SEVERITY, UNSPECIFIED WHETHER BEHAVIORAL, PSYCHOTIC, OR MOOD DISTURBANCE OR ANXIETY: ICD-10-CM

## 2024-08-05 DIAGNOSIS — R07.9 CHEST PAIN: ICD-10-CM

## 2024-08-05 DIAGNOSIS — F10.20 ALCOHOL USE DISORDER, SEVERE, DEPENDENCE: ICD-10-CM

## 2024-08-05 LAB
ALBUMIN SERPL BCP-MCNC: 3.5 G/DL (ref 3.5–5.2)
ALLENS TEST: ABNORMAL
ALLENS TEST: NORMAL
ALP SERPL-CCNC: 39 U/L (ref 55–135)
ALT SERPL W/O P-5'-P-CCNC: 24 U/L (ref 10–44)
AMMONIA PLAS-SCNC: 46 UMOL/L (ref 10–50)
AMPHET+METHAMPHET UR QL: ABNORMAL
ANION GAP SERPL CALC-SCNC: 17 MMOL/L (ref 8–16)
AST SERPL-CCNC: 49 U/L (ref 10–40)
BACTERIA #/AREA URNS AUTO: NORMAL /HPF
BARBITURATES UR QL SCN>200 NG/ML: NEGATIVE
BASOPHILS # BLD AUTO: 0.02 K/UL (ref 0–0.2)
BASOPHILS NFR BLD: 0.2 % (ref 0–1.9)
BENZODIAZ UR QL SCN>200 NG/ML: ABNORMAL
BILIRUB SERPL-MCNC: 0.4 MG/DL (ref 0.1–1)
BILIRUB UR QL STRIP: NEGATIVE
BNP SERPL-MCNC: 47 PG/ML (ref 0–99)
BUN SERPL-MCNC: 16 MG/DL (ref 8–23)
BZE UR QL SCN: NEGATIVE
CALCIUM SERPL-MCNC: 8.3 MG/DL (ref 8.7–10.5)
CANNABINOIDS UR QL SCN: NEGATIVE
CHLORIDE SERPL-SCNC: 100 MMOL/L (ref 95–110)
CLARITY UR REFRACT.AUTO: CLEAR
CO2 SERPL-SCNC: 18 MMOL/L (ref 23–29)
COLOR UR AUTO: YELLOW
CREAT SERPL-MCNC: 1.4 MG/DL (ref 0.5–1.4)
CREAT UR-MCNC: 123 MG/DL (ref 15–325)
DIFFERENTIAL METHOD BLD: ABNORMAL
EOSINOPHIL # BLD AUTO: 0 K/UL (ref 0–0.5)
EOSINOPHIL NFR BLD: 0.4 % (ref 0–8)
ERYTHROCYTE [DISTWIDTH] IN BLOOD BY AUTOMATED COUNT: 18.2 % (ref 11.5–14.5)
EST. GFR  (NO RACE VARIABLE): 41.5 ML/MIN/1.73 M^2
ETHANOL SERPL-MCNC: 155 MG/DL
ETHANOL UR-MCNC: 134 MG/DL
GLUCOSE SERPL-MCNC: 69 MG/DL (ref 70–110)
GLUCOSE UR QL STRIP: NEGATIVE
HCO3 UR-SCNC: 22.6 MMOL/L (ref 24–28)
HCO3 UR-SCNC: 25.9 MMOL/L (ref 24–28)
HCO3 UR-SCNC: 27.5 MMOL/L (ref 24–28)
HCT VFR BLD AUTO: 29.8 % (ref 37–48.5)
HCT VFR BLD CALC: 30 %PCV (ref 36–54)
HGB BLD-MCNC: 8.1 G/DL (ref 12–16)
HGB UR QL STRIP: NEGATIVE
HYALINE CASTS UR QL AUTO: 1 /LPF
IMM GRANULOCYTES # BLD AUTO: 0.06 K/UL (ref 0–0.04)
IMM GRANULOCYTES NFR BLD AUTO: 0.7 % (ref 0–0.5)
KETONES UR QL STRIP: ABNORMAL
LACTATE SERPL-SCNC: 1.7 MMOL/L (ref 0.5–2.2)
LDH SERPL L TO P-CCNC: 1.91 MMOL/L (ref 0.5–2.2)
LDH SERPL L TO P-CCNC: 2.98 MMOL/L (ref 0.5–2.2)
LEUKOCYTE ESTERASE UR QL STRIP: NEGATIVE
LYMPHOCYTES # BLD AUTO: 6.4 K/UL (ref 1–4.8)
LYMPHOCYTES NFR BLD: 75.2 % (ref 18–48)
MCH RBC QN AUTO: 24.6 PG (ref 27–31)
MCHC RBC AUTO-ENTMCNC: 27.2 G/DL (ref 32–36)
MCV RBC AUTO: 91 FL (ref 82–98)
METHADONE UR QL SCN>300 NG/ML: NEGATIVE
MICROSCOPIC COMMENT: NORMAL
MONOCYTES # BLD AUTO: 0.4 K/UL (ref 0.3–1)
MONOCYTES NFR BLD: 5 % (ref 4–15)
NEUTROPHILS # BLD AUTO: 1.6 K/UL (ref 1.8–7.7)
NEUTROPHILS NFR BLD: 18.5 % (ref 38–73)
NITRITE UR QL STRIP: NEGATIVE
NRBC BLD-RTO: 0 /100 WBC
OHS QRS DURATION: 90 MS
OHS QTC CALCULATION: 424 MS
OPIATES UR QL SCN: NEGATIVE
PCO2 BLDA: 48.7 MMHG (ref 35–45)
PCO2 BLDA: 49 MMHG (ref 35–45)
PCO2 BLDA: 53.3 MMHG (ref 35–45)
PCP UR QL SCN>25 NG/ML: NEGATIVE
PH SMN: 7.24 [PH] (ref 7.35–7.45)
PH SMN: 7.33 [PH] (ref 7.35–7.45)
PH SMN: 7.36 [PH] (ref 7.35–7.45)
PH UR STRIP: 7 [PH] (ref 5–8)
PLATELET # BLD AUTO: 70 K/UL (ref 150–450)
PMV BLD AUTO: 11.1 FL (ref 9.2–12.9)
PO2 BLDA: 46 MMHG (ref 40–60)
PO2 BLDA: 51 MMHG (ref 40–60)
PO2 BLDA: 60 MMHG (ref 40–60)
POC BE: -5 MMOL/L
POC BE: 0 MMOL/L
POC BE: 2 MMOL/L
POC IONIZED CALCIUM: 1.15 MMOL/L (ref 1.06–1.42)
POC SATURATED O2: 78 % (ref 95–100)
POC SATURATED O2: 83 % (ref 95–100)
POC SATURATED O2: 85 % (ref 95–100)
POC TCO2: 24 MMOL/L (ref 24–29)
POC TCO2: 27 MMOL/L (ref 24–29)
POC TCO2: 29 MMOL/L (ref 24–29)
POCT GLUCOSE: 237 MG/DL (ref 70–110)
POCT GLUCOSE: 90 MG/DL (ref 70–110)
POCT GLUCOSE: 95 MG/DL (ref 70–110)
POTASSIUM BLD-SCNC: 3.7 MMOL/L (ref 3.5–5.1)
POTASSIUM SERPL-SCNC: 4.9 MMOL/L (ref 3.5–5.1)
PROT SERPL-MCNC: 5.9 G/DL (ref 6–8.4)
PROT UR QL STRIP: ABNORMAL
RBC # BLD AUTO: 3.29 M/UL (ref 4–5.4)
RBC #/AREA URNS AUTO: 2 /HPF (ref 0–4)
SAMPLE: ABNORMAL
SAMPLE: NORMAL
SITE: ABNORMAL
SITE: NORMAL
SODIUM BLD-SCNC: 136 MMOL/L (ref 136–145)
SODIUM SERPL-SCNC: 135 MMOL/L (ref 136–145)
SP GR UR STRIP: 1.02 (ref 1–1.03)
SQUAMOUS #/AREA URNS AUTO: 0 /HPF
TOXICOLOGY INFORMATION: ABNORMAL
TROPONIN I SERPL DL<=0.01 NG/ML-MCNC: 0.01 NG/ML (ref 0–0.03)
URN SPEC COLLECT METH UR: ABNORMAL
WBC # BLD AUTO: 8.44 K/UL (ref 3.9–12.7)
WBC #/AREA URNS AUTO: 2 /HPF (ref 0–5)

## 2024-08-05 PROCEDURE — 83880 ASSAY OF NATRIURETIC PEPTIDE: CPT

## 2024-08-05 PROCEDURE — 84295 ASSAY OF SERUM SODIUM: CPT

## 2024-08-05 PROCEDURE — 99900035 HC TECH TIME PER 15 MIN (STAT)

## 2024-08-05 PROCEDURE — 96374 THER/PROPH/DIAG INJ IV PUSH: CPT | Mod: 59

## 2024-08-05 PROCEDURE — 63600175 PHARM REV CODE 636 W HCPCS: Performed by: NURSE PRACTITIONER

## 2024-08-05 PROCEDURE — 81001 URINALYSIS AUTO W/SCOPE: CPT

## 2024-08-05 PROCEDURE — 82330 ASSAY OF CALCIUM: CPT

## 2024-08-05 PROCEDURE — 82800 BLOOD PH: CPT

## 2024-08-05 PROCEDURE — 94761 N-INVAS EAR/PLS OXIMETRY MLT: CPT | Mod: XB

## 2024-08-05 PROCEDURE — 84132 ASSAY OF SERUM POTASSIUM: CPT

## 2024-08-05 PROCEDURE — 93010 ELECTROCARDIOGRAM REPORT: CPT | Mod: ,,, | Performed by: INTERNAL MEDICINE

## 2024-08-05 PROCEDURE — 96375 TX/PRO/DX INJ NEW DRUG ADDON: CPT

## 2024-08-05 PROCEDURE — 82803 BLOOD GASES ANY COMBINATION: CPT

## 2024-08-05 PROCEDURE — 85014 HEMATOCRIT: CPT

## 2024-08-05 PROCEDURE — 27100171 HC OXYGEN HIGH FLOW UP TO 24 HOURS

## 2024-08-05 PROCEDURE — 96365 THER/PROPH/DIAG IV INF INIT: CPT

## 2024-08-05 PROCEDURE — 94660 CPAP INITIATION&MGMT: CPT

## 2024-08-05 PROCEDURE — 85025 COMPLETE CBC W/AUTO DIFF WBC: CPT

## 2024-08-05 PROCEDURE — 5A09357 ASSISTANCE WITH RESPIRATORY VENTILATION, LESS THAN 24 CONSECUTIVE HOURS, CONTINUOUS POSITIVE AIRWAY PRESSURE: ICD-10-PCS | Performed by: HOSPITALIST

## 2024-08-05 PROCEDURE — 82077 ASSAY SPEC XCP UR&BREATH IA: CPT | Performed by: EMERGENCY MEDICINE

## 2024-08-05 PROCEDURE — 84484 ASSAY OF TROPONIN QUANT: CPT

## 2024-08-05 PROCEDURE — 82140 ASSAY OF AMMONIA: CPT

## 2024-08-05 PROCEDURE — 93005 ELECTROCARDIOGRAM TRACING: CPT

## 2024-08-05 PROCEDURE — 80307 DRUG TEST PRSMV CHEM ANLYZR: CPT

## 2024-08-05 PROCEDURE — 27000190 HC CPAP FULL FACE MASK W/VALVE

## 2024-08-05 PROCEDURE — 82962 GLUCOSE BLOOD TEST: CPT

## 2024-08-05 PROCEDURE — 25000003 PHARM REV CODE 250: Performed by: EMERGENCY MEDICINE

## 2024-08-05 PROCEDURE — 96361 HYDRATE IV INFUSION ADD-ON: CPT

## 2024-08-05 PROCEDURE — 87040 BLOOD CULTURE FOR BACTERIA: CPT | Mod: 59

## 2024-08-05 PROCEDURE — 63600175 PHARM REV CODE 636 W HCPCS: Performed by: EMERGENCY MEDICINE

## 2024-08-05 PROCEDURE — 12000002 HC ACUTE/MED SURGE SEMI-PRIVATE ROOM

## 2024-08-05 PROCEDURE — 83605 ASSAY OF LACTIC ACID: CPT

## 2024-08-05 PROCEDURE — 99285 EMERGENCY DEPT VISIT HI MDM: CPT | Mod: 25

## 2024-08-05 PROCEDURE — 80053 COMPREHEN METABOLIC PANEL: CPT

## 2024-08-05 PROCEDURE — 83605 ASSAY OF LACTIC ACID: CPT | Performed by: EMERGENCY MEDICINE

## 2024-08-05 RX ORDER — PANTOPRAZOLE SODIUM 40 MG/1
40 TABLET, DELAYED RELEASE ORAL DAILY
Status: DISCONTINUED | OUTPATIENT
Start: 2024-08-06 | End: 2024-08-08 | Stop reason: HOSPADM

## 2024-08-05 RX ORDER — GLUCAGON 1 MG
1 KIT INJECTION
Status: DISCONTINUED | OUTPATIENT
Start: 2024-08-05 | End: 2024-08-08 | Stop reason: HOSPADM

## 2024-08-05 RX ORDER — ONDANSETRON HYDROCHLORIDE 2 MG/ML
4 INJECTION, SOLUTION INTRAVENOUS
Status: COMPLETED | OUTPATIENT
Start: 2024-08-05 | End: 2024-08-05

## 2024-08-05 RX ORDER — ONDANSETRON HYDROCHLORIDE 2 MG/ML
4 INJECTION, SOLUTION INTRAVENOUS EVERY 8 HOURS PRN
Status: DISCONTINUED | OUTPATIENT
Start: 2024-08-05 | End: 2024-08-08 | Stop reason: HOSPADM

## 2024-08-05 RX ORDER — ACETAMINOPHEN 325 MG/1
650 TABLET ORAL EVERY 6 HOURS PRN
Status: DISCONTINUED | OUTPATIENT
Start: 2024-08-05 | End: 2024-08-08 | Stop reason: HOSPADM

## 2024-08-05 RX ORDER — DEXTROSE MONOHYDRATE AND SODIUM CHLORIDE 5; .9 G/100ML; G/100ML
INJECTION, SOLUTION INTRAVENOUS
Status: DISCONTINUED | OUTPATIENT
Start: 2024-08-05 | End: 2024-08-08 | Stop reason: HOSPADM

## 2024-08-05 RX ORDER — DIAZEPAM 5 MG/1
10 TABLET ORAL EVERY 6 HOURS
Status: DISCONTINUED | OUTPATIENT
Start: 2024-08-06 | End: 2024-08-08

## 2024-08-05 RX ORDER — IBUPROFEN 600 MG/1
600 TABLET ORAL
Status: COMPLETED | OUTPATIENT
Start: 2024-08-05 | End: 2024-08-05

## 2024-08-05 RX ORDER — NALOXONE HCL 0.4 MG/ML
0.02 VIAL (ML) INJECTION
Status: DISCONTINUED | OUTPATIENT
Start: 2024-08-05 | End: 2024-08-08 | Stop reason: HOSPADM

## 2024-08-05 RX ORDER — DIAZEPAM 10 MG/2ML
5 INJECTION INTRAMUSCULAR
Status: COMPLETED | OUTPATIENT
Start: 2024-08-05 | End: 2024-08-05

## 2024-08-05 RX ORDER — DIAZEPAM 10 MG/2ML
5 INJECTION INTRAMUSCULAR ONCE
Status: COMPLETED | OUTPATIENT
Start: 2024-08-05 | End: 2024-08-05

## 2024-08-05 RX ORDER — IBUPROFEN 200 MG
24 TABLET ORAL
Status: DISCONTINUED | OUTPATIENT
Start: 2024-08-05 | End: 2024-08-08 | Stop reason: HOSPADM

## 2024-08-05 RX ORDER — INSULIN ASPART 100 [IU]/ML
0-5 INJECTION, SOLUTION INTRAVENOUS; SUBCUTANEOUS
Status: DISCONTINUED | OUTPATIENT
Start: 2024-08-05 | End: 2024-08-08 | Stop reason: HOSPADM

## 2024-08-05 RX ORDER — DEXTROSE MONOHYDRATE 100 MG/ML
INJECTION, SOLUTION INTRAVENOUS
Status: COMPLETED | OUTPATIENT
Start: 2024-08-05 | End: 2024-08-05

## 2024-08-05 RX ORDER — LORAZEPAM 0.5 MG/1
2 TABLET ORAL EVERY 4 HOURS PRN
Status: DISCONTINUED | OUTPATIENT
Start: 2024-08-05 | End: 2024-08-08 | Stop reason: HOSPADM

## 2024-08-05 RX ORDER — THIAMINE HCL 100 MG
100 TABLET ORAL DAILY
Status: DISCONTINUED | OUTPATIENT
Start: 2024-08-06 | End: 2024-08-08 | Stop reason: HOSPADM

## 2024-08-05 RX ORDER — ASPIRIN 325 MG
325 TABLET ORAL
Status: DISPENSED | OUTPATIENT
Start: 2024-08-05 | End: 2024-08-06

## 2024-08-05 RX ORDER — ACETAMINOPHEN 500 MG
1000 TABLET ORAL EVERY 8 HOURS PRN
Status: DISCONTINUED | OUTPATIENT
Start: 2024-08-05 | End: 2024-08-08 | Stop reason: HOSPADM

## 2024-08-05 RX ORDER — IBUPROFEN 200 MG
16 TABLET ORAL
Status: DISCONTINUED | OUTPATIENT
Start: 2024-08-05 | End: 2024-08-08 | Stop reason: HOSPADM

## 2024-08-05 RX ORDER — FOLIC ACID 1 MG/1
1 TABLET ORAL DAILY
Status: DISCONTINUED | OUTPATIENT
Start: 2024-08-06 | End: 2024-08-08 | Stop reason: HOSPADM

## 2024-08-05 RX ORDER — SODIUM CHLORIDE 0.9 % (FLUSH) 0.9 %
10 SYRINGE (ML) INJECTION EVERY 12 HOURS PRN
Status: DISCONTINUED | OUTPATIENT
Start: 2024-08-05 | End: 2024-08-08 | Stop reason: HOSPADM

## 2024-08-05 RX ADMIN — DIAZEPAM 5 MG: 10 INJECTION, SOLUTION INTRAMUSCULAR; INTRAVENOUS at 09:08

## 2024-08-05 RX ADMIN — THIAMINE HYDROCHLORIDE 500 MG: 100 INJECTION, SOLUTION INTRAMUSCULAR; INTRAVENOUS at 01:08

## 2024-08-05 RX ADMIN — IBUPROFEN 600 MG: 600 TABLET, FILM COATED ORAL at 12:08

## 2024-08-05 RX ADMIN — DEXTROSE MONOHYDRATE: 100 INJECTION, SOLUTION INTRAVENOUS at 02:08

## 2024-08-05 RX ADMIN — DEXTROSE AND SODIUM CHLORIDE: 5; 900 INJECTION, SOLUTION INTRAVENOUS at 02:08

## 2024-08-05 RX ADMIN — DIAZEPAM 5 MG: 10 INJECTION, SOLUTION INTRAMUSCULAR; INTRAVENOUS at 11:08

## 2024-08-05 RX ADMIN — ONDANSETRON 4 MG: 2 INJECTION INTRAMUSCULAR; INTRAVENOUS at 09:08

## 2024-08-05 RX ADMIN — SODIUM CHLORIDE, POTASSIUM CHLORIDE, SODIUM LACTATE AND CALCIUM CHLORIDE 1000 ML: 600; 310; 30; 20 INJECTION, SOLUTION INTRAVENOUS at 02:08

## 2024-08-05 NOTE — ED PROVIDER NOTES
Encounter Date: 8/5/2024       History     Chief Complaint   Patient presents with    Hypotension     Initially activated EMS for SOB. Pt. Hypotensive upon EMS arrival.      HPI  Ms Self is a 63yo F w/ pmhx of sarcoidosis, leukemia, alcohol use disorder, HTN and Fatty liver presenting due to hypotension. Pt reports she was drinking Vodka today at home. She took her blood pressure and saw that it was low. States that she feels lightheaded. Denie sob, fevers, abdominal pain, leg swelling, headaches, dizziness, changes in urination, changes in bowel movements. Endorsing chest pain before she presented to the emergency room. States that it is a pressure sensation across the entire top of her chest. Denies SOB. Does not radiate anywhere.    Review of patient's allergies indicates:   Allergen Reactions    Lortab [hydrocodone-acetaminophen] Itching    Promethazine Itching and Other (See Comments)    Albuterol      Other Reaction(s): CONFUSION     Past Medical History:   Diagnosis Date    Alcoholism     c/b alcohol withdrawl seizures 7/2017    Anemia     Aortic atherosclerosis 04/17/2024    Cancer of breast 10/2020    s/p bilateral mastectomy for  T1b N0 stage IA breast cancer October 2020    CLL (chronic lymphocytic leukemia) 09/30/2022 6/22/22 - PB flow cytometry  Immunophenotyping of peripheral blood detects a distinct kappa light chain restricted monoclonal B-cell population  (calculated at 2.44x10 9 /L, from the most recent CBC showing a total WBC of  7.35 K/uL with 61% total lymphocytes)  with a CLL phenotype (coexpression of CD19, CD5, CD23 and dim CD20). CD22 (FITC), FMC-7 and CD38 are negative in this population.    Controlled type 2 diabetes mellitus without complication, without long-term current use of insulin 11/30/2021    COPD (chronic obstructive pulmonary disease)     Depression     Diverticulitis     Fatty liver     GERD (gastroesophageal reflux disease)     Hyperlipidemia     Hypertension      Pancreatitis     Peptic ulcer disease     Polysubstance abuse     Posterior reversible encephalopathy syndrome     Sarcoidosis of lung     over 30 yrs ago    Suicide attempt      Past Surgical History:   Procedure Laterality Date    APPENDECTOMY      BILATERAL MASTECTOMY Bilateral 10/29/2020    Procedure: MASTECTOMY, BILATERAL;  Surgeon: Baylee Kevin MD;  Location: [address];  Service: General;  Laterality: Bilateral;    BREAST REVISION SURGERY Bilateral 2/11/2021    Procedure: BREAST REVISION SURGERY;  Surgeon: Scottie Johnson MD;  Location: [address];  Service: Plastics;  Laterality: Bilateral;    COLONOSCOPY N/A 7/28/2017    Procedure: COLONOSCOPY;  Surgeon: Aaron Alvarado MD;  Location: [facility];  Service: Endoscopy;  Laterality: N/A;    ESOPHAGOGASTRODUODENOSCOPY  10/7/2016, 11/6/2014    2016 - gastritis, duodenitis, 2014 erosive gastritis    ESOPHAGOGASTRODUODENOSCOPY N/A 2/11/2020    Procedure: ESOPHAGOGASTRODUODENOSCOPY (EGD);  Surgeon: Fawn Garrido MD;  Location: [facility];  Service: Endoscopy;  Laterality: N/A;    ESOPHAGOGASTRODUODENOSCOPY N/A 4/19/2021    Procedure: EGD (ESOPHAGOGASTRODUODENOSCOPY);  Surgeon: Paramjit Martino MD;  Location: [facility];  Service: Endoscopy;  Laterality: N/A;    FLEXIBLE SIGMOIDOSCOPY  11/06/2014    colitis    HYSTERECTOMY      IMPLANTATION OF PERMANENT SACRAL NERVE STIMULATOR N/A 7/12/2022    Procedure: INSERTION, NEUROSTIMULATOR, PERMANENT, SACRAL;  Surgeon: Juaquin Edwards MD;  Location: [address];  Service: Urology;  Laterality: N/A;  1hr    INJECTION FOR SENTINEL NODE IDENTIFICATION Right 10/29/2020    Procedure: INJECTION, FOR SENTINEL NODE IDENTIFICATION;  Surgeon: Baylee Kevin MD;  Location: [address];  Service: General;  Laterality: Right;    INJECTION OF JOINT Right 10/10/2019    Procedure: Injection, Joint RIGHT ILIOPSOAS BURSA/TENDON INJECTION AND RIGHT GLUTEAL TENDON INJECTION WITH STEROID AND LIDOCAINE;   Surgeon: Guillaume Rico MD;  Location: [facility];  Service: Pain Management;  Laterality: Right;  NEEDS CONSENT    INSERTION OF BREAST TISSUE EXPANDER Bilateral 10/29/2020    Procedure: INSERTION, TISSUE EXPANDER, BREAST;  Surgeon: Scottie Johnson MD;  Location: [address];  Service: Plastics;  Laterality: Bilateral;  Right breast: 1082 g  Left breast: 1076 g    LIPOSUCTION Bilateral 2/11/2021    Procedure: LIPOSUCTION;  Surgeon: Scottie Johnson MD;  Location: [address];  Service: Plastics;  Laterality: Bilateral;    mediastenoscopy      REPLACEMENT OF IMPLANT OF BREAST Bilateral 2/11/2021    Procedure: REPLACEMENT, IMPLANT, BREAST;  Surgeon: Scottie Johnson MD;  Location: [address];  Service: Plastics;  Laterality: Bilateral;    SENTINEL LYMPH NODE BIOPSY Right 10/29/2020    Procedure: BIOPSY, LYMPH NODE, SENTINEL;  Surgeon: Baylee Kevin MD;  Location: [address];  Service: General;  Laterality: Right;    TONSILLECTOMY N/A 1970    TUBAL LIGATION       Family History   Problem Relation Name Age of Onset    Heart attack Father      Diabetes Father      Hypertension Father      Diabetes Mother      Hypertension Mother      Breast cancer Maternal Aunt      Colon cancer Maternal Uncle      Breast cancer Daughter      Ovarian cancer Neg Hx      Cancer Neg Hx       Social History     Tobacco Use    Smoking status: Every Day     Current packs/day: 0.00     Average packs/day: 0.5 packs/day for 30.0 years (15.0 ttl pk-yrs)     Types: Vaping with nicotine, Cigarettes     Start date: 2/1/1991     Last attempt to quit: 2/1/2021     Years since quitting: 3.5    Smokeless tobacco: Never    Tobacco comments:     Patient is currently smoking 10 cigarettes a day, declines nicotine patches   Substance Use Topics    Alcohol use: Yes     Comment: vodka daily (half a regular bottle) for 4 days    Drug use: Yes     Types: Marijuana     Comment: gummies     Review of Systems  Per  HPI  Physical Exam     Initial Vitals   BP Pulse Resp Temp SpO2   08/05/24 1147 08/05/24 1147 08/05/24 1147 08/05/24 1217 08/05/24 1147   (!) 76/43 67 (!) 22 97.9 °F (36.6 °C) 97 %      MAP       --                Physical Exam    Constitutional: She appears well-developed. She is not diaphoretic. No distress.   HENT:   Head: Normocephalic and atraumatic.   Neck: No JVD present.   Cardiovascular:  Normal rate and regular rhythm.           No murmur heard.  Pulses:       Radial pulses are 2+ on the right side and 2+ on the left side.        Dorsalis pedis pulses are 2+ on the right side and 2+ on the left side.   POCUS:   -IVC:1.38cm  -IVC inspiratory collapse: 50%<  -No fluid in the pericardium    Pulmonary/Chest: Breath sounds normal. No respiratory distress. She has no wheezes.   Abdominal: Abdomen is soft. Bowel sounds are normal. She exhibits no distension. There is no abdominal tenderness.   Musculoskeletal:         General: No edema.     Neurological: She is alert. GCS score is 15. GCS eye subscore is 4. GCS verbal subscore is 5. GCS motor subscore is 6.   Skin: Skin is warm and dry.   Psychiatric: She has a normal mood and affect.         ED Course   Procedures  Labs Reviewed   CBC W/ AUTO DIFFERENTIAL - Abnormal       Result Value    WBC 8.44      RBC 3.29 (*)     Hemoglobin 8.1 (*)     Hematocrit 29.8 (*)     MCV 91      MCH 24.6 (*)     MCHC 27.2 (*)     RDW 18.2 (*)     Platelets 70 (*)     MPV 11.1      Immature Granulocytes 0.7 (*)     Gran # (ANC) 1.6 (*)     Immature Grans (Abs) 0.06 (*)     Lymph # 6.4 (*)     Mono # 0.4      Eos # 0.0      Baso # 0.02      nRBC 0      Gran % 18.5 (*)     Lymph % 75.2 (*)     Mono % 5.0      Eosinophil % 0.4      Basophil % 0.2      Differential Method Automated     COMPREHENSIVE METABOLIC PANEL - Abnormal    Sodium 135 (*)     Potassium 4.9      Chloride 100      CO2 18 (*)     Glucose 69 (*)     BUN 16      Creatinine 1.4      Calcium 8.3 (*)     Total Protein 5.9  (*)     Albumin 3.5      Total Bilirubin 0.4      Alkaline Phosphatase 39 (*)     AST 49 (*)     ALT 24      eGFR 41.5 (*)     Anion Gap 17 (*)    URINALYSIS, REFLEX TO URINE CULTURE - Abnormal    Specimen UA Urine, Catheterized      Color, UA Yellow      Appearance, UA Clear      pH, UA 7.0      Specific Gravity, UA 1.020      Protein, UA 1+ (*)     Glucose, UA Negative      Ketones, UA 2+ (*)     Bilirubin (UA) Negative      Occult Blood UA Negative      Nitrite, UA Negative      Leukocytes, UA Negative      Narrative:     Specimen Source->Urine   ALCOHOL,MEDICAL (ETHANOL) - Abnormal    Alcohol, Serum 155 (*)    ISTAT LACTATE - Abnormal    POC Lactate 2.98 (*)     Sample VENOUS      Site Other      Allens Test N/A     POCT GLUCOSE - Abnormal    POCT Glucose 237 (*)    CULTURE, BLOOD   CULTURE, BLOOD   TROPONIN I    Troponin I 0.013     B-TYPE NATRIURETIC PEPTIDE    BNP 47     AMMONIA    Ammonia 46     URINALYSIS MICROSCOPIC    RBC, UA 2      WBC, UA 2      Bacteria Rare      Squam Epithel, UA 0      Hyaline Casts, UA 1      Microscopic Comment SEE COMMENT      Narrative:     Specimen Source->Urine   LACTIC ACID, PLASMA   POCT GLUCOSE    POCT Glucose 95     ISTAT LACTATE    POC Lactate 1.91      Sample VENOUS      Site Other      Allens Test N/A          ECG Results              EKG 12-lead (Final result)        Collection Time Result Time QRS Duration OHS QTC Calculation    08/05/24 11:49:15 08/05/24 13:40:12 90 424                     Final result by Interface, Lab In [facility] (08/05/24 13:40:18)                   Narrative:    Test Reason : I95.9,    Vent. Rate : 065 BPM     Atrial Rate : 065 BPM     P-R Int : 166 ms          QRS Dur : 090 ms      QT Int : 408 ms       P-R-T Axes : 088 080 059 degrees     QTc Int : 424 ms    Normal sinus rhythm  Normal ECG  When compared with ECG of 07-JUN-2024 16:04,  No significant change was found  Confirmed by Sg STEPHENSON MD ([id number]) on 8/5/2024 1:40:11 PM    Referred By: System  System           Confirmed By:Sg STEPHENSON MD                                  Imaging Results              X-Ray Chest AP Portable (Final result)  Result time 08/05/24 13:09:02      Final result by Luis Holder MD (08/05/24 13:09:02)                   Impression:      See above      Electronically signed by: Luis Holder MD  Date:    08/05/2024  Time:    13:09               Narrative:    EXAMINATION:  XR CHEST AP PORTABLE    CLINICAL HISTORY:  Sepsis;    TECHNIQUE:  Single frontal view of the chest was performed.    COMPARISON:  No 06/07/2024 ne    FINDINGS:  Heart size normal.  No significant airspace consolidation pleural effusion identified.                                       Medications   aspirin tablet 325 mg (325 mg Oral Not Given 8/5/24 1232)   dextrose 5 % and 0.9 % NaCl infusion (0 mL/hr Intravenous Stopped 8/5/24 1602)   ibuprofen tablet 600 mg (600 mg Oral Given 8/5/24 1250)   thiamine (B-1) 500 mg in D5W 100 mL IVPB (0 mg Intravenous Stopped 8/5/24 1419)   dextrose 10 % infusion (0 mL/hr Intravenous Stopped 8/5/24 1417)   lactated ringers bolus 1,000 mL (0 mLs Intravenous Stopped 8/5/24 1602)     Medical Decision Making  Ms. Abdul is a 65yo F presenting hypotensive with a systolic in the 70s and mildly altered. Positive for alcohol ingestion today. Initial rescusitation included fluid bolus with good response in pressure. Pt denying any symptoms except for lightheadedness. Will obtain septic work-up and imaging to assess etiology of shock. No JVD, no lower extremity swelling, collapsible IVC, no signs tamponade on pocus.  Low suspicion for obstructive etiology of shock.  White count WNL, afebrile, UA negative for nitrites/white blood cells/blood, chest x-ray negative.  Low suspicion for sepsis.  EKG stable, troponin negative, lungs clear, no signs of lower extremity edema.  Low concern for cardiogenic etiology.  Given the size of IVC/collapsibility even after initial fluid resuscitation, rapid  response of blood pressure to fluid resuscitation, history of persistent alcohol intake, decreased glucose on presentation, presence of ketones in urine, hypovolemic shock higher on the differential the setting of likely poor nutritional replenishment.  Will start patient on thiamine and D5 normal saline.    Care transferred to Dr. Bliss.         Amount and/or Complexity of Data Reviewed  Labs: ordered. Decision-making details documented in ED Course.  Radiology: ordered.    Risk  OTC drugs.  Prescription drug management.               ED Course as of 08/05/24 1611   Mon Aug 05, 2024   1149 BP(!): 76/43 [MB]   1149 Pulse: 67 [MB]   1149 Resp(!): 22 [MB]   1248 POC Lactate(!): 2.98 [MB]   1358 Troponin I: 0.013 [MB]   1358 BNP: 47 [MB]   1358 Sodium(!): 135 [MB]   1358 Potassium: 4.9 [MB]   1358 Creatinine: 1.4 [MB]   1358 BP(!): 157/74 [MB]   1603 Alcohol, Serum(!): 155 [MB]   1604 Ketones, UA(!): 2+ [MB]   1604 Protein, UA(!): 1+ [MB]   1604 POC Lactate: 1.91 [MB]   1604 WBC: 8.44 [MB]   1604 Hemoglobin(!): 8.1 [MB]   1604 Anion Gap(!): 17 [MB]      ED Course User Index  [MB] Alejandro Magdaleno MD                           Clinical Impression:  Final diagnoses:  [I95.9] Hypotension  [F10.10] ETOH abuse  [Z86.69] History of obstructive sleep apnea  [F10.21] History of alcoholism  [E16.2] Hypoglycemia  [E86.1] Hypotension due to hypovolemia (Primary)                 Alejandro Magdaleno MD  Resident  08/05/24 1611

## 2024-08-05 NOTE — ED NOTES
Per message from hospital pharmacy, pt's specific medication was being made and will be tubed to the ED when complete for administration.

## 2024-08-05 NOTE — ED NOTES
Pt states that she doesn't want the medication order for her pain according to the physician.MD notified.

## 2024-08-05 NOTE — ED TRIAGE NOTES
Earl Abdul, a 64 y.o. female presents to the ED w/ complaint of shortness of breath and hypotension. Pt arrives to the ED via EMS with complaints of shortness of breath along with hypotension. Pt was at home when a sudden onset of shortness of breath occur, pt states she didn't feel good and decided to call EMS. Upon EMS arrival to the pt's home she was hypotensive. Pt states she did drink  bottle of vodka today. Pt states she is have some chest pressure and pain. Pt has slurred speech but is AAOX4 currently.     Triage note:  Chief Complaint   Patient presents with    Hypotension     Initially activated EMS for SOB. Pt. Hypotensive upon EMS arrival.      Review of patient's allergies indicates:   Allergen Reactions    Lortab [hydrocodone-acetaminophen] Itching    Promethazine Itching and Other (See Comments)    Albuterol      Other Reaction(s): CONFUSION     Past Medical History:   Diagnosis Date    Alcoholism     c/b alcohol withdrawl seizures 7/2017    Anemia     Aortic atherosclerosis 04/17/2024    Cancer of breast 10/2020    s/p bilateral mastectomy for  T1b N0 stage IA breast cancer October 2020    CLL (chronic lymphocytic leukemia) 09/30/2022 6/22/22 - PB flow cytometry  Immunophenotyping of peripheral blood detects a distinct kappa light chain restricted monoclonal B-cell population  (calculated at 2.44x10 9 /L, from the most recent CBC showing a total WBC of  7.35 K/uL with 61% total lymphocytes)  with a CLL phenotype (coexpression of CD19, CD5, CD23 and dim CD20). CD22 (FITC), FMC-7 and CD38 are negative in this population.    Controlled type 2 diabetes mellitus without complication, without long-term current use of insulin 11/30/2021    COPD (chronic obstructive pulmonary disease)     Depression     Diverticulitis     Fatty liver     GERD (gastroesophageal reflux disease)     Hyperlipidemia     Hypertension     Pancreatitis     Peptic ulcer disease     Polysubstance abuse     Posterior  reversible encephalopathy syndrome     Sarcoidosis of lung     over 30 yrs ago    Suicide attempt

## 2024-08-05 NOTE — Clinical Note
Diagnosis: Hypotension due to hypovolemia [[id number]]   Future Attending Provider: MARIA LUZ DIOR [[id number]]

## 2024-08-05 NOTE — ED NOTES
Pt's POCT CBG 95. Informed attending of current lab. Per physician current D10 infusion should be discontinued.

## 2024-08-06 PROBLEM — F10.27 DEMENTIA ASSOCIATED WITH ALCOHOLISM: Status: ACTIVE | Noted: 2024-08-06

## 2024-08-06 LAB
ALBUMIN SERPL BCP-MCNC: 3.4 G/DL (ref 3.5–5.2)
ALBUMIN SERPL BCP-MCNC: 3.6 G/DL (ref 3.5–5.2)
ALP SERPL-CCNC: 42 U/L (ref 55–135)
ALP SERPL-CCNC: 45 U/L (ref 55–135)
ALT SERPL W/O P-5'-P-CCNC: 23 U/L (ref 10–44)
ALT SERPL W/O P-5'-P-CCNC: 25 U/L (ref 10–44)
ANION GAP SERPL CALC-SCNC: 15 MMOL/L (ref 8–16)
ANION GAP SERPL CALC-SCNC: 18 MMOL/L (ref 8–16)
ANISOCYTOSIS BLD QL SMEAR: SLIGHT
AST SERPL-CCNC: 32 U/L (ref 10–40)
AST SERPL-CCNC: 35 U/L (ref 10–40)
BASOPHILS # BLD AUTO: 0.02 K/UL (ref 0–0.2)
BASOPHILS NFR BLD: 0.3 % (ref 0–1.9)
BILIRUB SERPL-MCNC: 0.5 MG/DL (ref 0.1–1)
BILIRUB SERPL-MCNC: 0.5 MG/DL (ref 0.1–1)
BUN SERPL-MCNC: 16 MG/DL (ref 8–23)
BUN SERPL-MCNC: 16 MG/DL (ref 8–23)
CALCIUM SERPL-MCNC: 8.1 MG/DL (ref 8.7–10.5)
CALCIUM SERPL-MCNC: 8.2 MG/DL (ref 8.7–10.5)
CHLORIDE SERPL-SCNC: 101 MMOL/L (ref 95–110)
CHLORIDE SERPL-SCNC: 101 MMOL/L (ref 95–110)
CO2 SERPL-SCNC: 18 MMOL/L (ref 23–29)
CO2 SERPL-SCNC: 21 MMOL/L (ref 23–29)
CREAT SERPL-MCNC: 1.4 MG/DL (ref 0.5–1.4)
CREAT SERPL-MCNC: 1.5 MG/DL (ref 0.5–1.4)
DIFFERENTIAL METHOD BLD: ABNORMAL
EOSINOPHIL # BLD AUTO: 0.1 K/UL (ref 0–0.5)
EOSINOPHIL NFR BLD: 0.9 % (ref 0–8)
ERYTHROCYTE [DISTWIDTH] IN BLOOD BY AUTOMATED COUNT: 18.2 % (ref 11.5–14.5)
EST. GFR  (NO RACE VARIABLE): 38.2 ML/MIN/1.73 M^2
EST. GFR  (NO RACE VARIABLE): 41.5 ML/MIN/1.73 M^2
GLUCOSE SERPL-MCNC: 82 MG/DL (ref 70–110)
GLUCOSE SERPL-MCNC: 86 MG/DL (ref 70–110)
HCT VFR BLD AUTO: 32.8 % (ref 37–48.5)
HGB BLD-MCNC: 8.9 G/DL (ref 12–16)
IMM GRANULOCYTES # BLD AUTO: 0.04 K/UL (ref 0–0.04)
IMM GRANULOCYTES NFR BLD AUTO: 0.5 % (ref 0–0.5)
LYMPHOCYTES # BLD AUTO: 5.8 K/UL (ref 1–4.8)
LYMPHOCYTES NFR BLD: 73.9 % (ref 18–48)
MAGNESIUM SERPL-MCNC: 1.4 MG/DL (ref 1.6–2.6)
MAGNESIUM SERPL-MCNC: 1.5 MG/DL (ref 1.6–2.6)
MCH RBC QN AUTO: 24.5 PG (ref 27–31)
MCHC RBC AUTO-ENTMCNC: 27.1 G/DL (ref 32–36)
MCV RBC AUTO: 90 FL (ref 82–98)
MONOCYTES # BLD AUTO: 0.4 K/UL (ref 0.3–1)
MONOCYTES NFR BLD: 5.1 % (ref 4–15)
NEUTROPHILS # BLD AUTO: 1.5 K/UL (ref 1.8–7.7)
NEUTROPHILS NFR BLD: 19.3 % (ref 38–73)
NRBC BLD-RTO: 0 /100 WBC
OVALOCYTES BLD QL SMEAR: ABNORMAL
PHOSPHATE SERPL-MCNC: 1.7 MG/DL (ref 2.7–4.5)
PHOSPHATE SERPL-MCNC: 1.8 MG/DL (ref 2.7–4.5)
PLATELET # BLD AUTO: 56 K/UL (ref 150–450)
PLATELET BLD QL SMEAR: ABNORMAL
PMV BLD AUTO: 10.6 FL (ref 9.2–12.9)
POCT GLUCOSE: 103 MG/DL (ref 70–110)
POCT GLUCOSE: 130 MG/DL (ref 70–110)
POIKILOCYTOSIS BLD QL SMEAR: SLIGHT
POTASSIUM SERPL-SCNC: 4.1 MMOL/L (ref 3.5–5.1)
POTASSIUM SERPL-SCNC: 4.3 MMOL/L (ref 3.5–5.1)
PROT SERPL-MCNC: 5.7 G/DL (ref 6–8.4)
PROT SERPL-MCNC: 5.8 G/DL (ref 6–8.4)
RBC # BLD AUTO: 3.64 M/UL (ref 4–5.4)
SODIUM SERPL-SCNC: 137 MMOL/L (ref 136–145)
SODIUM SERPL-SCNC: 137 MMOL/L (ref 136–145)
WBC # BLD AUTO: 7.79 K/UL (ref 3.9–12.7)

## 2024-08-06 PROCEDURE — 85025 COMPLETE CBC W/AUTO DIFF WBC: CPT | Performed by: NURSE PRACTITIONER

## 2024-08-06 PROCEDURE — 25000003 PHARM REV CODE 250: Performed by: NURSE PRACTITIONER

## 2024-08-06 PROCEDURE — 21400001 HC TELEMETRY ROOM

## 2024-08-06 PROCEDURE — 84100 ASSAY OF PHOSPHORUS: CPT | Mod: 91 | Performed by: HOSPITALIST

## 2024-08-06 PROCEDURE — 94660 CPAP INITIATION&MGMT: CPT

## 2024-08-06 PROCEDURE — 99900035 HC TECH TIME PER 15 MIN (STAT)

## 2024-08-06 PROCEDURE — 94761 N-INVAS EAR/PLS OXIMETRY MLT: CPT

## 2024-08-06 PROCEDURE — 25000003 PHARM REV CODE 250: Performed by: HOSPITALIST

## 2024-08-06 PROCEDURE — 82962 GLUCOSE BLOOD TEST: CPT

## 2024-08-06 PROCEDURE — 80053 COMPREHEN METABOLIC PANEL: CPT | Performed by: NURSE PRACTITIONER

## 2024-08-06 PROCEDURE — 63600175 PHARM REV CODE 636 W HCPCS: Performed by: NURSE PRACTITIONER

## 2024-08-06 PROCEDURE — 80053 COMPREHEN METABOLIC PANEL: CPT | Mod: 91 | Performed by: HOSPITALIST

## 2024-08-06 PROCEDURE — 96367 TX/PROPH/DG ADDL SEQ IV INF: CPT

## 2024-08-06 PROCEDURE — 96366 THER/PROPH/DIAG IV INF ADDON: CPT

## 2024-08-06 PROCEDURE — 84100 ASSAY OF PHOSPHORUS: CPT | Performed by: NURSE PRACTITIONER

## 2024-08-06 PROCEDURE — 83735 ASSAY OF MAGNESIUM: CPT | Mod: 91 | Performed by: HOSPITALIST

## 2024-08-06 PROCEDURE — 27000190 HC CPAP FULL FACE MASK W/VALVE

## 2024-08-06 PROCEDURE — 99222 1ST HOSP IP/OBS MODERATE 55: CPT | Mod: ,,, | Performed by: PSYCHIATRY & NEUROLOGY

## 2024-08-06 PROCEDURE — 83735 ASSAY OF MAGNESIUM: CPT | Performed by: NURSE PRACTITIONER

## 2024-08-06 RX ORDER — IPRATROPIUM BROMIDE AND ALBUTEROL SULFATE 2.5; .5 MG/3ML; MG/3ML
3 SOLUTION RESPIRATORY (INHALATION) EVERY 4 HOURS PRN
Status: DISCONTINUED | OUTPATIENT
Start: 2024-08-06 | End: 2024-08-07

## 2024-08-06 RX ORDER — MAGNESIUM SULFATE 1 G/100ML
1 INJECTION INTRAVENOUS ONCE
Status: COMPLETED | OUTPATIENT
Start: 2024-08-06 | End: 2024-08-06

## 2024-08-06 RX ORDER — GABAPENTIN 300 MG/1
300 CAPSULE ORAL 3 TIMES DAILY
Status: DISCONTINUED | OUTPATIENT
Start: 2024-08-06 | End: 2024-08-08 | Stop reason: HOSPADM

## 2024-08-06 RX ORDER — DIAZEPAM 5 MG/1
10 TABLET ORAL EVERY 12 HOURS
Status: DISCONTINUED | OUTPATIENT
Start: 2024-08-08 | End: 2024-08-08

## 2024-08-06 RX ORDER — MAGNESIUM SULFATE HEPTAHYDRATE 40 MG/ML
2 INJECTION, SOLUTION INTRAVENOUS ONCE
Status: COMPLETED | OUTPATIENT
Start: 2024-08-06 | End: 2024-08-06

## 2024-08-06 RX ORDER — DIAZEPAM 5 MG/1
10 TABLET ORAL EVERY 8 HOURS
Status: ACTIVE | OUTPATIENT
Start: 2024-08-06 | End: 2024-08-06

## 2024-08-06 RX ORDER — DIAZEPAM 5 MG/1
5 TABLET ORAL EVERY 12 HOURS
Status: DISCONTINUED | OUTPATIENT
Start: 2024-08-09 | End: 2024-08-08

## 2024-08-06 RX ORDER — METOPROLOL SUCCINATE 50 MG/1
50 TABLET, EXTENDED RELEASE ORAL DAILY
Status: DISCONTINUED | OUTPATIENT
Start: 2024-08-06 | End: 2024-08-08 | Stop reason: HOSPADM

## 2024-08-06 RX ORDER — SODIUM,POTASSIUM PHOSPHATES 280-250MG
2 POWDER IN PACKET (EA) ORAL ONCE
Status: COMPLETED | OUTPATIENT
Start: 2024-08-06 | End: 2024-08-06

## 2024-08-06 RX ORDER — SODIUM CHLORIDE, SODIUM LACTATE, POTASSIUM CHLORIDE, CALCIUM CHLORIDE 600; 310; 30; 20 MG/100ML; MG/100ML; MG/100ML; MG/100ML
INJECTION, SOLUTION INTRAVENOUS CONTINUOUS
Status: ACTIVE | OUTPATIENT
Start: 2024-08-06 | End: 2024-08-06

## 2024-08-06 RX ORDER — TRAZODONE HYDROCHLORIDE 100 MG/1
200 TABLET ORAL NIGHTLY
Status: DISCONTINUED | OUTPATIENT
Start: 2024-08-06 | End: 2024-08-08 | Stop reason: HOSPADM

## 2024-08-06 RX ORDER — ESCITALOPRAM OXALATE 10 MG/1
20 TABLET ORAL DAILY
Status: DISCONTINUED | OUTPATIENT
Start: 2024-08-06 | End: 2024-08-08 | Stop reason: HOSPADM

## 2024-08-06 RX ADMIN — DIAZEPAM 10 MG: 5 TABLET ORAL at 11:08

## 2024-08-06 RX ADMIN — TRAZODONE HYDROCHLORIDE 200 MG: 50 TABLET ORAL at 09:08

## 2024-08-06 RX ADMIN — APIXABAN 5 MG: 5 TABLET, FILM COATED ORAL at 08:08

## 2024-08-06 RX ADMIN — FOLIC ACID 1 MG: 1 TABLET ORAL at 08:08

## 2024-08-06 RX ADMIN — DIBASIC SODIUM PHOSPHATE, MONOBASIC POTASSIUM PHOSPHATE AND MONOBASIC SODIUM PHOSPHATE 2 TABLET: 852; 155; 130 TABLET ORAL at 04:08

## 2024-08-06 RX ADMIN — LORAZEPAM 2 MG: 1 TABLET ORAL at 04:08

## 2024-08-06 RX ADMIN — LEVOTHYROXINE SODIUM 75 MCG: 25 TABLET ORAL at 05:08

## 2024-08-06 RX ADMIN — MAGNESIUM SULFATE HEPTAHYDRATE 2 G: 40 INJECTION, SOLUTION INTRAVENOUS at 04:08

## 2024-08-06 RX ADMIN — ESCITALOPRAM OXALATE 20 MG: 5 TABLET, FILM COATED ORAL at 08:08

## 2024-08-06 RX ADMIN — APIXABAN 5 MG: 5 TABLET, FILM COATED ORAL at 09:08

## 2024-08-06 RX ADMIN — DIAZEPAM 10 MG: 5 TABLET ORAL at 12:08

## 2024-08-06 RX ADMIN — PANTOPRAZOLE SODIUM 40 MG: 40 TABLET, DELAYED RELEASE ORAL at 08:08

## 2024-08-06 RX ADMIN — SODIUM CHLORIDE, POTASSIUM CHLORIDE, SODIUM LACTATE AND CALCIUM CHLORIDE: 600; 310; 30; 20 INJECTION, SOLUTION INTRAVENOUS at 03:08

## 2024-08-06 RX ADMIN — Medication 100 MG: at 08:08

## 2024-08-06 RX ADMIN — DIBASIC SODIUM PHOSPHATE, MONOBASIC POTASSIUM PHOSPHATE AND MONOBASIC SODIUM PHOSPHATE 2 TABLET: 852; 155; 130 TABLET ORAL at 02:08

## 2024-08-06 RX ADMIN — METOPROLOL SUCCINATE 50 MG: 50 TABLET, EXTENDED RELEASE ORAL at 08:08

## 2024-08-06 RX ADMIN — POTASSIUM PHOSPHATE, MONOBASIC AND POTASSIUM PHOSPHATE, DIBASIC 15 MMOL: 224; 236 INJECTION, SOLUTION, CONCENTRATE INTRAVENOUS at 10:08

## 2024-08-06 RX ADMIN — THERA TABS 1 TABLET: TAB at 08:08

## 2024-08-06 RX ADMIN — MAGNESIUM SULFATE 1 G: 500 INJECTION, SOLUTION INTRAMUSCULAR; INTRAVENOUS at 02:08

## 2024-08-06 RX ADMIN — GABAPENTIN 300 MG: 300 CAPSULE ORAL at 04:08

## 2024-08-06 RX ADMIN — DIAZEPAM 10 MG: 5 TABLET ORAL at 06:08

## 2024-08-06 RX ADMIN — GABAPENTIN 300 MG: 300 CAPSULE ORAL at 08:08

## 2024-08-06 RX ADMIN — GABAPENTIN 300 MG: 300 CAPSULE ORAL at 09:08

## 2024-08-06 RX ADMIN — TRAZODONE HYDROCHLORIDE 200 MG: 50 TABLET ORAL at 02:08

## 2024-08-06 RX ADMIN — LORAZEPAM 2 MG: 1 TABLET ORAL at 07:08

## 2024-08-06 RX ADMIN — DIAZEPAM 10 MG: 5 TABLET ORAL at 05:08

## 2024-08-06 RX ADMIN — POTASSIUM & SODIUM PHOSPHATES POWDER PACK 280-160-250 MG 2 PACKET: 280-160-250 PACK at 10:08
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
## 2024-08-06 NOTE — ASSESSMENT & PLAN NOTE
Patient has chronic hypothyroidism. TFTs reviewed-   Lab Results   Component Value Date    TSH 1.637 06/07/2024   Will continue chronic levothyroxine and adjust for and clinical changes.

## 2024-08-06 NOTE — ASSESSMENT & PLAN NOTE
Anemia is likely due to chronic disease due to Chronic liver disease. Most recent hemoglobin and hematocrit are listed below.  Recent Labs     08/05/24  1229 08/05/24  1642   HGB 8.1*  --    HCT 29.8* 30*     Plan  - Monitor serial CBC: Daily  - Transfuse PRBC if patient becomes hemodynamically unstable, symptomatic or H/H drops below 7/21.  - Patient has not received any PRBC transfusions to date  - Patient's anemia is currently stable

## 2024-08-06 NOTE — NURSING
Nurses Note -- 4 Eyes      8/6/2024   6:56 PM      Skin assessed during: Admit      [x] No Altered Skin Integrity Present    []Prevention Measures Documented      [] Yes- Altered Skin Integrity Present or Discovered   [] LDA Added if Not in Epic (Describe Wound)   [] New Altered Skin Integrity was Present on Admit and Documented in LDA   [] Wound Image Taken    Wound Care Consulted? No    Attending Nurse:  Leonardo Shepherd RN/Staff Member:   Altaf

## 2024-08-06 NOTE — ASSESSMENT & PLAN NOTE
Patient's COPD is controlled currently.  Patient is currently off COPD Pathway. Continue scheduled inhalers Supplemental oxygen and monitor respiratory status closely.   -PRN duo nebs

## 2024-08-06 NOTE — ASSESSMENT & PLAN NOTE
Alcohol withdrawal   -Patient reports drinking 1/2 fifth vodka, last drink on earlier today.  -ETOH level 155  -UDS +benzos,   -Nursing to perform CIWA assessment and treat for objective signs of DTs.    -Valium 10 mg C0tnfop for one day --> 10 mg O7djbob for one day --> 10 mg Q12 hours for one day --> 5 mg Q12 hours for one day as tolerated. (A)    2.  Continue Trazadone 200 mg and Lexapro 20 mg as ascheduled (C)  -Initiate folic acid, thiamine, and MVI.    -Discussed the dangers of continued ETOH use with Pt and apparent systemic effects of her abuse.    -Will consult with case management for resources on cessation.

## 2024-08-06 NOTE — ASSESSMENT & PLAN NOTE
Chronic, stable. Latest blood pressure and vitals reviewed-     Temp:  [97.6 °F (36.4 °C)-97.9 °F (36.6 °C)]   Pulse:  []   Resp:  [13-37]   BP: ()/(43-79)   SpO2:  [95 %-100 %] .   Home meds for hypertension were reviewed and noted below.   Hypertension Medications               losartan (COZAAR) 25 MG tablet Take 25 mg by mouth once daily.    metoprolol succinate (TOPROL-XL) 50 MG 24 hr tablet Take 1 tablet (50 mg total) by mouth once daily.            While in the hospital, will manage blood pressure as follows; Adjust home antihypertensive regimen as follows- Hold losartan in the setting of DEVANTE and hypotension. Currently normotensive, so will resume metoprolol.    Will utilize p.r.n. blood pressure medication only if patient's blood pressure greater than 180/110 and she develops symptoms such as worsening chest pain or shortness of breath.

## 2024-08-06 NOTE — SUBJECTIVE & OBJECTIVE
Interval History: NAEON. Pt seen and evaluated at bedside this am. Pt doing well and feeling improved this am but continues to report some depressed mood. Psychiatry placed recs for valium taper.     Review of Systems   Constitutional:  Negative for appetite change, chills, diaphoresis, fatigue and fever.   HENT:  Negative for congestion and rhinorrhea.    Eyes:  Negative for photophobia and visual disturbance.   Respiratory:  Negative for cough, shortness of breath and wheezing.    Cardiovascular:  Negative for chest pain, palpitations and leg swelling.   Gastrointestinal:  Positive for nausea. Negative for abdominal distention, abdominal pain, diarrhea and vomiting.   Genitourinary:  Negative for dysuria, frequency and hematuria.   Musculoskeletal:  Negative for back pain, myalgias and neck pain.   Skin:  Negative for color change, pallor, rash and wound.   Neurological:  Negative for syncope, weakness, light-headedness and headaches.   Psychiatric/Behavioral:  Negative for confusion and hallucinations. The patient is not nervous/anxious.      Objective:     Vital Signs (Most Recent):  Temp: 98.2 °F (36.8 °C) (08/06/24 0730)  Pulse: 74 (08/06/24 0900)  Resp: 19 (08/06/24 0900)  BP: (!) 159/72 (08/06/24 0900)  SpO2: 100 % (08/06/24 0900) Vital Signs (24h Range):  Temp:  [97.6 °F (36.4 °C)-98.2 °F (36.8 °C)] 98.2 °F (36.8 °C)  Pulse:  [] 74  Resp:  [13-37] 19  SpO2:  [95 %-100 %] 100 %  BP: ()/(43-79) 159/72     Weight: 77.1 kg (170 lb)  Body mass index is 27.44 kg/m².    Intake/Output Summary (Last 24 hours) at 8/6/2024 1114  Last data filed at 8/6/2024 0622  Gross per 24 hour   Intake 2262.33 ml   Output 600 ml   Net 1662.33 ml         Physical Exam  Vitals and nursing note reviewed.   Constitutional:       General: She is not in acute distress.     Appearance: She is not toxic-appearing or diaphoretic.   HENT:      Head: Normocephalic and atraumatic.      Mouth/Throat:      Mouth: Mucous membranes are  moist.   Eyes:      Pupils: Pupils are equal, round, and reactive to light.   Cardiovascular:      Rate and Rhythm: Normal rate and regular rhythm.      Pulses: Normal pulses.   Pulmonary:      Effort: Pulmonary effort is normal. No respiratory distress.      Breath sounds: No wheezing, rhonchi or rales.   Abdominal:      General: Bowel sounds are normal. There is no distension.      Palpations: Abdomen is soft.      Tenderness: There is no abdominal tenderness. There is no guarding.   Musculoskeletal:         General: Normal range of motion.      Cervical back: Normal range of motion.      Right lower leg: No edema.      Left lower leg: No edema.   Skin:     General: Skin is warm and dry.      Capillary Refill: Capillary refill takes less than 2 seconds.   Neurological:      General: No focal deficit present.      Mental Status: She is alert and oriented to person, place, and time.      Motor: Tremor present.   Psychiatric:         Attention and Perception: Attention normal.         Mood and Affect: Affect is flat.         Behavior: Behavior is withdrawn. Behavior is cooperative.             Significant Labs: All pertinent labs within the past 24 hours have been reviewed.    Significant Imaging: I have reviewed all pertinent imaging results/findings within the past 24 hours.

## 2024-08-06 NOTE — HPI
Earl Abdul is a 64 y.o. female with a PMHx of ETOH abuse, anemia, CLL, DM2, COPD, depression, HTN, HLD, GERD, sarcoidosis, nicotine dependence, and fatty liver presenting due to hypotension and lightheadedness. She took her blood pressure and saw that it was low. States that she feels lightheaded worse with standing. She denies focal weakness, headache, or numbness/tingling. Pt reports she was drinking Vodka today at home, about 1/2 fifth. She reports she is interested in pursuing treatment for her alcohol abuse. Denies SOB, CP, fevers, abdominal pain, leg swelling, dizziness, changes in urination, changes in bowel movements. Reports developing nausea and vomiting after arriving to the ED.     In the ED, pt initially hypotensive which quickly improved with IVF, also noted to be tachypneic and was placed on bipap due to being mildly hypercapnic for <1 hr and was weaned to 2L via nasal cannula, afebrile. CBC with stable anemia and thrombocytopenia. Na 135. Bicarb 18. Anion gap 17. Cr 1.4 (bl 0.8-0.9). Glucose 69. POC glucose 95. Total protein 5.9. BNP 47. Troponin 0.013. POC lactate 2.98>1.91. Lactate 1.7. EtOH 155. Blood cxs in process. pH 7.23>7.35, PCO2 53.3>49. EKG shows SR, 65 bpm, no acute ischemic changes. UA non infectious. CXR negative for acute process. The patient received 1L LR bolus, D10 bolus, D5 bolus, ibuprofen, zofran, IV thiamine, and IV valium 5mg.

## 2024-08-06 NOTE — PLAN OF CARE
Problem: Adult Inpatient Plan of Care  Goal: Plan of Care Review  Outcome: Progressing  Goal: Patient-Specific Goal (Individualized)  Outcome: Progressing  Goal: Absence of Hospital-Acquired Illness or Injury  Outcome: Progressing  Goal: Optimal Comfort and Wellbeing  Outcome: Progressing  Goal: Readiness for Transition of Care  Outcome: Progressing     Problem: Diabetes Comorbidity  Goal: Blood Glucose Level Within Targeted Range  Outcome: Progressing     Problem: Acute Kidney Injury/Impairment  Goal: Fluid and Electrolyte Balance  Outcome: Progressing  Goal: Improved Oral Intake  Outcome: Progressing  Goal: Effective Renal Function  Outcome: Progressing     Problem: Skin Injury Risk Increased  Goal: Skin Health and Integrity  Outcome: Progressing     Problem: Sepsis/Septic Shock  Goal: Optimal Coping  Outcome: Progressing  Goal: Absence of Bleeding  Outcome: Progressing  Goal: Blood Glucose Level Within Targeted Range  Outcome: Progressing  Goal: Absence of Infection Signs and Symptoms  Outcome: Progressing  Goal: Optimal Nutrition Intake  Outcome: Progressing     Problem: Fall Injury Risk  Goal: Absence of Fall and Fall-Related Injury  Outcome: Progressing     Problem: Alcohol Withdrawal  Goal: Alcohol Withdrawal Symptom Control  Outcome: Progressing  Goal: Optimal Neurologic Function  Outcome: Progressing  Goal: Readiness for Change Identified  Outcome: Progressing

## 2024-08-06 NOTE — ASSESSMENT & PLAN NOTE
The likely etiology of thrombocytopenia is liver disease. The patients 3 most recent labs are listed below.  Recent Labs     08/05/24  1229 08/06/24  0306   PLT 70* 56*       Plan  - Will transfuse if platelet count is <50k (if undergoing surgical procedure or have active bleeding).

## 2024-08-06 NOTE — ED NOTES
Assumed care of patient at this time. Patient is resting comfortably in bed in lowest, locked position with bed rails raised x2 in NAD. Pt remains in hospital gown, cardiac monitoring in place. BiPAP remains on, mask remains on patient. Call light is within reach of patient. Patient denies further needs at this time.

## 2024-08-06 NOTE — ASSESSMENT & PLAN NOTE
Patient has Abnormal Magnesium: hypomagnesemia. Will continue to monitor electrolytes closely. Will replace the affected electrolytes and repeat labs to be done after interventions completed. The patient's magnesium results have been reviewed and are listed below.  Recent Labs   Lab 08/06/24  0306   MG 1.5*

## 2024-08-06 NOTE — ED NOTES
Nurses Note -- 4 Eyes      8/5/2024   7:47 PM      Skin assessed during: Admit      [x] No Altered Skin Integrity Present    [x]Prevention Measures Documented      [] Yes- Altered Skin Integrity Present or Discovered   [] LDA Added if Not in Epic (Describe Wound)   [] New Altered Skin Integrity was Present on Admit and Documented in LDA   [] Wound Image Taken    Wound Care Consulted? No    Attending Nurse:  Roxann Shepherd RN/Staff Member:   Wendi

## 2024-08-06 NOTE — ASSESSMENT & PLAN NOTE
In the setting of EtOH use. Bicarb 18, Anion gap 17, Glucose 69, EtOH 155, pH 7.23  -Received thiamine 100mg IV, D10 bolus, D5 bolus.  -Continue IVF, MVI, thiamine, and folic acid daily  -Replete electrolytes PRN.  -Continue IVF.  -Daily CMP.

## 2024-08-06 NOTE — ED NOTES
BIPAP removed at this time by Dr Bliss. Plan is to repeat VBG in approximately 1 hour to check status

## 2024-08-06 NOTE — ASSESSMENT & PLAN NOTE
Anemia is likely due to chronic disease due to Chronic liver disease. Most recent hemoglobin and hematocrit are listed below.  Recent Labs     08/05/24  1229 08/05/24  1642 08/06/24  0306   HGB 8.1*  --  8.9*   HCT 29.8* 30* 32.8*       Plan  - Monitor serial CBC: Daily  - Transfuse PRBC if patient becomes hemodynamically unstable, symptomatic or H/H drops below 7/21.  - Patient has not received any PRBC transfusions to date  - Patient's anemia is currently stable

## 2024-08-06 NOTE — ASSESSMENT & PLAN NOTE
DEVANTE is likely due to pre-renal azotemia due to dehydration. Baseline creatinine is  0.8-0.9 . Most recent creatinine and eGFR are listed below.  Recent Labs     08/05/24  1229 08/06/24  0001 08/06/24  0306   CREATININE 1.4 1.5* 1.4   EGFRNORACEVR 41.5* 38.2* 41.5*        Plan  - DEVANTE is stable  - Avoid nephrotoxins and renally dose meds for GFR listed above  - Monitor urine output, serial BMP, and adjust therapy as needed

## 2024-08-06 NOTE — ASSESSMENT & PLAN NOTE
The likely etiology of thrombocytopenia is liver disease. The patients 3 most recent labs are listed below.  Recent Labs     08/05/24  1229   PLT 70*     Plan  - Will transfuse if platelet count is <50k (if undergoing surgical procedure or have active bleeding).

## 2024-08-06 NOTE — ASSESSMENT & PLAN NOTE
Patient with Paroxysmal (<7 days) atrial fibrillation which is controlled currently with Beta Blocker. Patient is currently in sinus rhythm.[id number] Score: 3. Anticoagulation indicated. Anticoagulation done with eliquis .

## 2024-08-06 NOTE — ED NOTES
Telemetry Verification   Patient placed on Telemetry Box  Verified with War Room  Box # [id number]   Monitor Tech Nithin   Rate 69   Rhythm NS

## 2024-08-06 NOTE — ASSESSMENT & PLAN NOTE
Alcohol withdrawal   -Patient reports drinking 1/2 fifth vodka, last drink on earlier today.  -ETOH level 155  -UDS +benzos,   -Nursing to perform CIWA assessment and treat for objective signs of DTs.    -Will initiate Valium q.i.d., and utilize p.o. Ativan for objective signs of DTs.    -Initiate folic acid, thiamine, and MVI.    -Discussed the dangers of continued ETOH use with Pt and apparent systemic effects of her abuse.    -Will consult with case management for resources on cessation.

## 2024-08-06 NOTE — ASSESSMENT & PLAN NOTE
Patient has Abnormal Phosphorus: hypophosphatemia. Will continue to monitor electrolytes closely. Will replace the affected electrolytes and repeat labs to be done after interventions completed. The patient's phosphorus results have been reviewed and are listed below.  Recent Labs   Lab 08/06/24  0306   PHOS 1.7*

## 2024-08-06 NOTE — ASSESSMENT & PLAN NOTE
Patient's FSGs are uncontrolled due to hypoglycemia likely due to poor oral intake on current medication regimen.  Last A1c reviewed-   Lab Results   Component Value Date    HGBA1C 5.9 (H) 06/09/2024     Most recent fingerstick glucose reviewed-   Recent Labs   Lab 08/05/24  1412 08/05/24  1547 08/05/24  2156 08/06/24  0024   POCTGLUCOSE 95 237* 90 103     Current correctional scale  stable  Maintain anti-hyperglycemic dose as follows-   Antihyperglycemics (From admission, onward)      Start     Stop Route Frequency Ordered    08/05/24 2226  insulin aspart U-100 pen 0-5 Units         -- SubQ Before meals & nightly PRN 08/05/24 2126          Hold Oral hypoglycemics while patient is in the hospital.  -Accuchecks AC/HS  -Diabetic/cardiac diet.

## 2024-08-06 NOTE — ASSESSMENT & PLAN NOTE
Patient has chronic hypothyroidism. TFTs reviewed-   Lab Results   Component Value Date    TSH 1.637 06/07/2024   . Will continue chronic levothyroxine and adjust for and clinical changes.

## 2024-08-06 NOTE — HOSPITAL COURSE
"Pt placed in observation for hypotension altered mental status that is likely secondary to intoxicated state. Pt initially acidotic which was resolved w/ Bipap. CXR w/o acute process. Infectious w/u negative. Serum ETOH 155. UDS w/ presumptive amphetamines and benzos. Pt treated w/ IV dextrose, IV thiamine, and valium taper. Addiction psych consulted. She was started on IP Valium taper. She was seen by psychiatry/addiction medicine multiple times. Patient reported feeling good on taper. No symptoms and wants to go home. She denied having cravings. She was stable and ready for discharge on Valium taper as recommended by psych. She was given resources for alcohol cessation programs.    "RECOMMENDATIONS FOR DETOX TAPER: Valium 10 mg [id number] for one day --> 10 mg [id number] for one day --> 10 mg Q12 hours for one day --> 5 mg Q12 hours for one day as tolerated. "  "

## 2024-08-06 NOTE — CONSULTS
"{  ADHD   AIMS   AUDIT   AUDIT-C   C-SSRS (Screen)   C-SSRS (Short)   C-SSRS (Full)   DAST   DAST-10   HEATHER-7   MMSE   MOCA   PCL-5   PHQ-9   ROSALINA   [facility]   :[id number]}  INITIAL VISIT: ADDICTION PSYCHIATRY CONSULTATION SERVICE      ASSESSMENT AND PLAN:     DIAGNOSES & PROBLEMS:  ***    In Summary:  ***    Plan:  ***  {Dispo and Management Options:30760::" "," "}    PRESENTATION:     Earl Abdul presents with the following chief complaint: problematic substance use/abuse and alcohol and/or drug addiction    Per Chart:  Initially presented for hypotension. Patient reports drinking 1/2 fifth vodka, last drink on earlier today (8/5). ETOH level 155 on admit. Patient has history of complicated withdrawals, seizures. Somnolent in ED.       Per Patient:  ***    Collateral:   ***        REVIEW OF SYSTEMS:  I[]I Patient denies any pertinent ROS, and none is known.  I[]I Patient unable or unwilling to provide any ROS.    [] Y  [] N  sleep disturbance: ** {Globally:91195:::1} {Positive for:68113:::1}  [] Y  [] N  appetite/weight change: ** {Globally:14712:::1} {Positive for:48179:::1}  [] Y  [] N  fatigue/anergia: ** {Globally:78050:::1} {Positive for:02753:::1}  [] Y  [] N  impairment in focus/concentration: ** {Globally:94791:::1} {Positive for:12864:::1}     [] Y  [] N  depression: ** {Globally:02050:::1} {Positive for:34617:::1} {Negative for:07433:::1}  [] Y  [] N  anxiety/worry: ** {Globally:03643:::1} {Positive for:00619:::1} {Negative for:28209:::1}  [] Y  [] N  dysregulated mood/behavior: ** {Globally:47141:::1} {Positive for:94301:::1} {Negative for:27830:::1}  [] Y  [] N  manic symptomatology: ** {Globally:66501:::1} {Positive for:68031:::1} {Negative for:21758:::1}  [] Y  [] N  psychosis: ** {Globally:21596:::1} {Positive for:79054:::1} {Negative for:25875:::1}  {Pertinent +/-:08401::" "} {Positive for:61674:::1} {Negative for:10776:::1}    A pertinent medical review of " systems was performed with the following notable findings: ***      Workup significant for the following:  CBC with normocytic anemia. Initially hyponatremia (135).  CMP with starvation ketosis. Decreased magnesium and phosphorus. Hypoalbumenemia. UDS + benzos, amphetamines. Ethanol level 155. UA w ketones. TSH ***. B12 ***. Folate ***. RPR ***. ROYCE ***. CT head ***. Chest xray wo acute abnormality. MRI ***. Lumbar puncture ***. EEG ***.   Kidney function is impaired (creatinine of 1.4, baseline 0.8, BUN of 16). Liver function is mildly impaired (AST 49 / ALT 24). EKG with Qtc of 424 on 8/5/24.       CURRENT PSYCHOTROPIC REGIMEN:  I[]I Y  I[]I N  I[]I U    ***      ADDICTION:     I[]I Y  I[]I N  I[]I U  I[]I Current  I[]I Former  Nicotine Use:   I[]I Y  I[]I N  I[]I U  I[]I Current  I[]I Former  Alcohol Use:   I[]I Y  I[]I N  I[]I U  I[]I Current  I[]I Former  Alcohol Misuse/Abuse:   I[]I Y  I[]I N  I[]I U  I[]I Current  I[]I Former  Illicit Drug Use/Misuse/Abuse:   I[]I Y  I[]I N  I[]I U  I[]I Current  I[]I Former  Misuse/Abuse of Rx Medications:   I[]I Cannabis  I[]I Cocaine  I[]I Heroin  I[]I Meth  I[]I Opioids  I[]I Stimulants  I[]I Benzos  I[]I Other:     I[]I N/A  I[]I U  Substance(s) of Choice: ***  I[]I N/A  I[]I U  Substance(s) Used Currently/Recently: ***  I[]I N/A  I[]I U  Alcohol Consumption: {Alcohol Intake:35326} ***  I[]I N/A  I[]I U  Last Drink: ***  I[]I N/A  I[]I U  Last Drug Use: ***  I[]I N/A  I[]I U  Duration of Sobriety/Abstinence: ***    I[]I Y  I[]I N  I[]I U  Hx of Detox:   I[]I Y  I[]I N  I[]I U  Hx of Rehab:   I[]I Y  I[]I N  I[]I U  Hx of IVDU:   I[]I Y  I[]I N  I[]I U  Hx of Accidental Overdose:   I[]I Y  I[]I N  I[]I U  Hx of DUI:   I[]I Y  I[]I N  I[]I U  Hx of Complicated Withdrawal (i.e. Seizures and/or Delirium Tremens):   I[]I Y  I[]I N  I[]I U  Hx of Known/Suspected Substance-Induced Psychiatric Disorder:   I[]I Y  I[]I N  I[]I U  Hx of Medication Assisted Treatment:   I[]I Y   "I[]I N  I[]I U  Hx of Twelve Step Program (or Equivalent) Involvement:   I[]I Y  I[]I N  I[]I U  Currently Exhibits Signs of Intoxication:   I[]I Y  I[]I N  I[]I U  Currently Exhibits Signs of Withdrawal:   I[]I Y  I[]I N  I[]I U  Currently Active in Recovery:   I[]I Y  I[]I N  I[]I U  Social Support:   I[]I Y  I[]I N  I[]I U  Spouse/Partner Consumption:     I[]I N/A  I[]I Y  I[]I N  I[]I U  Acknowledges/Accepts Addiction:   I[]I N/A  I[]I Y  I[]I N  I[]I U  Advised to Quit/Cut Back:   I[]I N/A  I[]I Y  I[]I N  I[]I U  Alcohol/Drug Cessation ("Wants to Quit"): {WF-Gjwzcudvcf-Noxwauvyc:94514}  I[]I N/A  I[]I Y  I[]I N  I[]I U  Motivation to Pursue Treatment: {XX-Additional-Motivation:59412}  I[]I N/A  I[]I Y  I[]I N  I[]I U  Tobacco Cessation ("Wants to Quit"): {Cessation Counselin}    DSM-5-TR SUBSTANCE USE DISORDER CRITERIA:     -- Impaired Control:  I[]I Y  I[]I N  I[]I U  I[]I A  I[]I D  Often take in larger amounts or over a longer period of time than was intended:   I[]I Y  I[]I N  I[]I U  I[]I A  I[]I D  Persistent desire or unsuccessful efforts to cut down or control use:   I[]I Y  I[]I N  I[]I U  I[]I A  I[]I D  Great deal of time spent in activities necessary to obtain substance, use, or recover from effects:   I[]I Y  I[]I N  I[]I U  I[]I A  I[]I D  Craving/strong desire for substance or urge to use:   -- Social Impairment:  I[]I Y  I[]I N  I[]I U  I[]I A  I[]I D  Use resulting in failure to fulfill major role obligations at home, work or school:   I[]I Y  I[]I N  I[]I U  I[]I A  I[]I D  Social, occupational, recreational activities decreased because of use:   I[]I Y  I[]I N  I[]I U  I[]I A  I[]I D  Continued use despite having persistent or recurrent social or interpersonal problems caused or exacerbated by the substance:   -- Risky Use:  I[]I Y  I[]I N  I[]I U  I[]I A  I[]I D  Recurrent use in situations in which it is physically hazardous:   I[]I Y  I[]I N  I[]I U  I[]I A  I[]I D  Use despite " physical or psychological problems that are likely to have been caused or exacerbated by the substance:   -- Neuroadaptation:  I[]I Y  I[]I N  I[]I U  I[]I A  I[]I D  Tolerance, as defined by either of the following: (1) a need for markedly increased amounts of substance to achieve intoxication or desired effect.  -OR- (2) a markedly diminished effect with continued use of the same amount of substance:   I[]I Y  I[]I N  I[]I U  I[]I A  I[]I D  Withdrawal, as manifested by either of the following: (1) the characteristic withdrawal syndrome for substance.  -OR- (2) substance is taken to relieve or avoid withdrawal symptoms:   -- Mild (1-3), Moderate (4-5), Severe (?6)    I[]I N/A  I[]I Y  I[]I N  I[]I U  I[]I A  I[]I D  Active Substance Use Disorder:       HISTORY:     I[]I Patient denies any history, and none is known.  I[]I Patient unable or unwilling to provide any history.    I[]I Y  I[]I N  I[]I U  Psychiatric Diagnoses: ***  I[]I Y  I[]I N  I[]I U  Current Psychiatric Provider (if Applicable):   I[]I Y  I[]I N  I[]I U  Hx of Psychiatric Hospitalization:   I[]I Y  I[]I N  I[]I U  Hx of Outpatient Psychiatric Treatment (psychiatry/psychotherapy):   I[]I Y  I[]I N  I[]I U  Psychotropic Trials: ***  I[]I Y  I[]I N  I[]I U  Prior Suicide Attempts:   I[]I Y  I[]I N  I[]I U  Hx of Suicidal Ideation:   I[]I Y  I[]I N  I[]I U  Hx of Homicidal Ideation:   I[]I Y  I[]I N  I[]I U  Hx of Self-Injurious Behavior (Non-Suicidal):   I[]I Y  I[]I N  I[]I U  Hx of Violence:   I[]I Y  I[]I N  I[]I U  Documented Hx of Malingering:     FAMILY HISTORY:  I[]I Y  I[]I N  I[]I U    ***    I[]I Y  I[]I N  I[]I U  Hx of Trauma/Neglect:   I[]I Y  I[]I N  I[]I U  Hx of Physical Abuse:   I[]I Y  I[]I N  I[]I U  Hx of Sexual Abuse:   I[]I Y  I[]I N  I[]I U  Happy Childhood:   I[]I Y  I[]I N  I[]I U  Significant Developmental Delay/Disability:   I[]I Y  I[]I N  I[]I U  GED/High School Diploma or Beyond:   I[]I Y  I[]I N  I[]I U  Currently  "Employed:   I[]I Y  I[]I N  I[]I U  On or Applying for Disability:   I[]I Y  I[]I N  I[]I U  Functions Independently:   I[]I Y  I[]I N  I[]I U  Financially Stable:   I[]I Y  I[]I N  I[]I U  Domiciled:   I[]I Y  I[]I N  I[]I U  Lives Alone:   I[]I Y  I[]I N  I[]I U  Intact Support System:   I[]I Y  I[]I N  I[]I U  Heterosexual/Cisgender:   I[]I Y  I[]I N  I[]I U  Currently in a Romantic Relationship:   I[]I Y  I[]I N  I[]I U  Ever :   I[]I Y  I[]I N  I[]I U  Children/Dependents:   I[]I Y  I[]I N  I[]I U  Orthodox/Spiritual:   I[]I Y  I[]I N  I[]I U   History:   I[]I Y  I[]I N  I[]I U  Current Legal Issues:   I[]I Y  I[]I N  I[]I U  Past Charges/Convictions:   I[]I Y  I[]I N  I[]I U  Hx of Incarceration:   I[]I Y  I[]I N  I[]I U  Access to a Gun?: ***  {XX-GunSafety:27660::"NOTE: patient counseled on gun safety, including safe storage.","NOTE: patient counseled on inherent risks associated with gun ownership."}    I[]I Y  I[]I N  I[]I U  Hx of Seizure:   I[]I Y  I[]I N  I[]I U  Hx of Significant Head Injury (e.g., Loss of Consciousness, Concussion, Coma):   I[]I Y  I[]I N  I[]I U  Medical History & Diagnoses:       The patient's past medical history has been reviewed and updated as appropriate within the electronic medical record system.  {Problem List & Past Medical History:69064::"     "}    Scheduled and PRN Medications: The electronic chart was reviewed and updated as appropriate.  See Virool for details.  {Current Medications:67685::"     "}    Allergies:  Lortab [hydrocodone-acetaminophen], Promethazine, and Albuterol    PSYCHOSOCIAL FACTORS:  Stressors (Biopsychosocial, Cultural and Environmental): {XX-Stressors:12834}  Functioning Relationships: {Psychfxinrelationships:87578}    STRENGTHS AND LIABILITIES:   {XX-Strengths:31656}  {XX-Liabilities:44922}    Additional Relevant History, As Applicable:       EXAMINATION:     BP (!) 161/67   Pulse 76   Temp 97.9 °F (36.6 °C) (Oral)   Resp (!) 21  " " Wt 77.1 kg (170 lb)   SpO2 95%   Breastfeeding No   BMI 27.44 kg/m²     MENTAL STATUS EXAMINATION:  General Appearance: ** {Free Text:56542::"***"} {Drop Down:93514}  Behavior: ** {Free Text:22027::"***"} {Drop Down:38803}  Involuntary Movements and Motor Activity: ** {Free Text:77964::"***"} {Drop Down:26443}  Gait and Station: ** {Free Text:57818::"***"} {Drop Down:87809}  Speech and Language: ** {Free Text:75036::"***"} {Drop Down:39985}  Mood: "***"  Affect: ** {Free Text:04948::"***"} {Drop Down:49337}  Thought Process and Associations: ** {Free Text:62224::"***"} {Drop Down:48970}  Thought Content and Perceptions: ** {Free Text:71293::"***"} {Drop Down:72188}  Sensorium: ** {Free Text:35810::"***"} {Drop Down:81777}  Recent and Remote Memory: ** {Free Text:04402::"***"} {Drop Down:67408}  Attention and Concentration: ** {Free Text:00901::"***"} {Drop Down:80635}  Fund of Knowledge: ** {Free Text:78377::"***"} {Drop Down:92631}  Insight: ** {Free Text:20754::"***"} {Drop Down:65795}  Judgment: ** {Free Text:44197::"***"} {Drop Down:78249}      RISK MANAGEMENT:     I[]I Y  I[]I N  I[]I U  I[]I A  Suicidal Ideation/Behavior: ** {Specifiers:40663}  I[]I Y  I[]I N  I[]I U  I[]I A  Homicidal Ideation/Behavior: **  I[]I Y  I[]I N  I[]I U  I[]I A  Violence: **  I[]I Y  I[]I N  I[]I U  I[]I A  Self-Injurious Behavior: **    The patient is deemed to be {Reliability:97245}.    I[]I Y  I[]I N  I[]I U  I[]I A  I[]I N/A  Minimization of Risk Parameters Suspected/Evident: **  I[]I Y  I[]I N  I[]I U  I[]I A  I[]I N/A  Exaggeration of Risk Parameters Suspected/Evident: **  ***    [] Y  [] N  Danger to Self:   [] Y  [] N  Danger to Others:   [] Y  [] N  Grave Disability:       In cases of emergency, daily coverage provided by Acute/ER Psych MD, NP, PA, or SW, with contact numbers located in [facility] On Call Schedule.    [facility]  Department of Psychiatry  [facility]      KEY:     I[]I Y = " "Yes / Present / Endorses  I[]I N = No / Absent / Denies  I[]I U = Unknown / Unable to Assess / Unwilling to Participate  I[]I A = Ambiguity Exists / Accuracy Uncertain  I[]I D = Denial or Minimization is Suspected/Evident  I[]I N/A = Non-Applicable    CHART REVIEW:     Available documentation has been reviewed, and pertinent elements of the chart have been incorporated into this evaluation where appropriate.    The patient's last Epic encounter in the psychiatry department was on: Visit date not found  The patient's first Epic encounter in the psychiatry department was on:      LA/MS  [facility]  Site reviewed - { Options:02934::" "}  ***    ADVICE AND COUNSELING:     [x] In cases of emergencies (e.g. SI/HI resulting in danger to self or others, functioning deteriorates to the level of grave disability), call 911 or [phone], or present to the emergency department for immediate assistance.  [x] Individuals should not operate a motor vehicle or heavy machinery if effects of medications or underlying symptoms/condition impair the ability to safely do so.    Alcohol, Tobacco, and Drug Counseling, as well as resources, has been provided, as warranted.     Shared medical decision making and informed consent are the hallmark and bedrock of good clinical care, and as such have been employed and obtained, respectively, to the degree possible.      Risk Mitigation Strategies, Harm Reduction Techniques, and Safety Netting are important interventions that can reduce acute and chronic risk, and as such have been employed to the degree possible.    Prescription Drug Management entails the review, recommendation, or consideration without recommendation of medications, and as such was employed during the encounter.    Additional Psychoeducation has been provided, as warranted.    Discussed, to the extent possible, diagnosis, risks and benefits of proposed treatment vs alternative treatments vs no treatment, potential side effects of " these treatments and the inherent unpredictability of treatment. {Ability to Consent:49200}    Written material*** has been provided to supplement, augment, and reinforce any discussions and interventions, via the AVS or other pre-printed handouts, as warranted.      DIAGNOSTIC TESTING:     The chart was reviewed for recent diagnostic procedures and investigations, and pertinent results are noted below.     Glu 86  8/6/2024  Li *   *  TSH 1.637  6/7/2024    HgA1c 5.9 (H)  6/9/2024  VPA *   *   FT4 1.12  3/1/2024    Na 137  8/6/2024  CLZ *   *  WBC 7.79  8/6/2024    Cr 1.4  8/6/2024  ANC 1.5; 19.3 (L);  (L)  8/6/2024   Hgb 8.9 (L)  8/6/2024     BUN 16  8/6/2024  Trop I 0.013  8/5/2024  HCT 32.8 (L)  8/6/2024     GFR 41.5 (A)  8/6/2024   CPK 75  5/17/2024  PLT 56 (L)  8/6/2024     Alb 3.4 (L)  8/6/2024   PRL *   *  B12 372  5/17/2024     T Bili 0.5  8/6/2024  Chol 184  4/6/2023  B9 15.9  5/17/2024    ALP 42 (L)  8/6/2024  TGs 161 (H)  4/6/2023  B1 114  *    AST 32  8/6/2024  HDL 44  4/6/2023  Vit D *   *     ALT 23  8/6/2024  .8  4/6/2023  HIV Non-reactive  4/11/2024     INR 1.0  3/2/2024  Baylee *   *   Hep C Non-reactive  4/11/2024    GGT *   *  Lip 38  6/7/2024  RPR *   *    MCV 90  8/6/2024   NH4 46  8/5/2024  UPT *   *      PETH 301  11/13/2023  THC Negative  8/5/2024    ETOH 155 (H)  8/5/2024  DESIREE Negative  8/5/2024    EtG Negative  5/21/2024  AMP Presumptive Positive (A)  8/5/2024     (A)  8/5/2024  OPI Negative  8/5/2024    BZO Presumptive Positive (A)  8/5/2024  MTD Negative  8/5/2024     BAR Negative  8/5/2024  BUP *   *    PCP Negative  8/5/2024  FEN Not Detected  7/18/2023     Results for orders placed or performed during the hospital encounter of 08/05/24   EKG 12-lead    Collection Time: 08/05/24 11:49 AM   Result Value Ref Range    QRS Duration 90 ms    [facility] QTC Calculation 424 ms    Narrative    Test Reason : I95.9,    Vent. Rate : 065  BPM     Atrial Rate : 065 BPM     P-R Int : 166 ms          QRS Dur : 090 ms      QT Int : 408 ms       P-R-T Axes : 088 080 059 degrees     QTc Int : 424 ms    Normal sinus rhythm  Normal ECG  When compared with ECG of 07-JUN-2024 16:04,  No significant change was found  Confirmed by Sg STEPHENSON MD ([id number]) on 8/5/2024 1:40:11 PM    Referred By: System System           Confirmed By:Sg STEPHENSON MD       Results for orders placed or performed during the hospital encounter of 03/01/24   CT Head Without Contrast    Narrative    EXAMINATION:  CT HEAD WITHOUT CONTRAST    CLINICAL HISTORY:  Neuro deficit, acute, stroke suspected;left UE weakness started 1 week ago. She had Afib RVR y'day. CT head result will guide me to start anticoagulant. Thank you;    TECHNIQUE:  Low dose axial CT images obtained throughout the head without intravenous contrast. Sagittal and coronal reconstructions were performed.    COMPARISON:  CT head from 02/16/2024.    FINDINGS:  Ventricles and sulci are normal in size for age without evidence of hydrocephalus. No extra-axial blood or fluid collections.  Mild chronic microvascular ischemic changes, stable.  No parenchymal mass, hemorrhage, edema or major vascular distribution infarct.    No calvarial fracture.  The scalp is unremarkable.  Bilateral paranasal sinuses and mastoid air cells are clear.      Impression    No acute intracranial abnormality.      Electronically signed by: Kaden Payne  Date:    03/03/2024  Time:    10:04   Results for orders placed or performed during the hospital encounter of 06/10/22   MRI Brain Without Contrast    Narrative    EXAMINATION:  MRI BRAIN WITHOUT CONTRAST    CLINICAL HISTORY:  hx of PRES;    TECHNIQUE:  Multiplanar multisequence MR imaging of the brain was performed without intravenous contrast.    COMPARISON:  Head CT 06/10/2022, brain MRI 10/04/2017, 07/21/2017    FINDINGS:  Intracranial Compartment:    Ventricles are normal in size for age without evidence  of hydrocephalus.    Ill-defined focus T2-FLAIR signal hyperintensity in the right periatrial white matter.  Few additional scattered punctate foci elsewhere within the supratentorial white matter.  These appear similar to the prior study of 10/04/2017.  No definite new focal lesions.  No diffusion restriction to indicate an acute infarction.  No remote major vascular distribution infarct.  No recent or remote hemorrhage.  No mass effect or midline shift.    No extra-axial blood or fluid collections.    Normal vascular flow voids are preserved.    Skull/Extracranial Contents (limited evaluation):    Bone marrow signal intensity is normal.      Impression    No evidence of acute intracranial pathology.      Electronically signed by: Miguel Malagon MD  Date:    06/10/2022  Time:    09:45     *Note: Due to a large number of results and/or encounters for the requested time period, some results have not been displayed. A complete set of results can be found in Results Review.       CONSULTATION:     A diagnostic psychiatric evaluation was performed and responsiveness to treatment was assessed.  {XX-ResponseTX:00821}    Consults    {XX-Courtesy:86444}

## 2024-08-06 NOTE — ASSESSMENT & PLAN NOTE
Patient with Paroxysmal (<7 days) atrial fibrillation which is controlled currently with Beta Blocker. Patient is currently in sinus rhythm.[id number] Score: 3. Anticoagulation indicated. Anticoagulation done with eliquis .

## 2024-08-06 NOTE — ASSESSMENT & PLAN NOTE
Patient is not chronically on statin. Monitor clinically. Last LDL was   Lab Results   Component Value Date    LDLCALC 107.8 04/06/2023

## 2024-08-06 NOTE — ASSESSMENT & PLAN NOTE
-Initially presented with significant hypotension.   -Resolved with IVF.  -Infectious work up unrevealing including CXR and UA.  -Blood cxs in process.  -LA 2.98>1.91>1.7

## 2024-08-06 NOTE — ASSESSMENT & PLAN NOTE
Patient's FSGs are uncontrolled due to hypoglycemia likely due to poor oral intake on current medication regimen.  Last A1c reviewed-   Lab Results   Component Value Date    HGBA1C 5.9 (H) 06/09/2024     Most recent fingerstick glucose reviewed-   Recent Labs   Lab 08/05/24  1547 08/05/24  2156 08/06/24  0024 08/06/24  0842   POCTGLUCOSE 237* 90 103 130*       Current correctional scale  stable  Maintain anti-hyperglycemic dose as follows-   Antihyperglycemics (From admission, onward)    Start     Stop Route Frequency Ordered    08/05/24 2226  insulin aspart U-100 pen 0-5 Units         -- SubQ Before meals & nightly PRN 08/05/24 2126        Hold Oral hypoglycemics while patient is in the hospital.  -Accuchecks AC/HS  -Diabetic/cardiac diet.

## 2024-08-06 NOTE — H&P
[facility] - Emergency Dept  [facility] Medicine  History & Physical    Patient Name: Earl Abdul  MRN: [id number]  Patient Class: OP- Observation  Admission Date: 8/5/2024  Attending Physician: Tracie Last MD   Primary Care Provider: Andrew Rodriguez MD         Patient information was obtained from patient, past medical records, and ER records.     Subjective:     Principal Problem:<principal problem not specified>    Chief Complaint:   Chief Complaint   Patient presents with    Hypotension     Initially activated EMS for SOB. Pt. Hypotensive upon EMS arrival.         HPI: Earl Abdul is a 64 y.o. female with a PMHx of      f 65 Y/O alcoholic F presenting for hypotension altered mental status that is likely secondary to intoxicated state. Transfer of care to me pending laboratories, sobriety, reassessment and admission for hypotensive state. She was very somnolent leading to VBG revealing slight hypercapnia, which in the setting of VINICIO and smoking history, we placed her on BiPAP until better sobriety. We repeated VBG a proximally 1.5 hours after removing her from BiPAP to assure no need of ICU care.   I believe her hypoglycemic state is from poor nutrition (poor glycogen storage). Her hypovolemic state likely secondary to osmotic diuresis from her significant alcohol consumption and poor hydration/food intake.      Past Medical History:   Diagnosis Date    Alcoholism     c/b alcohol withdrawl seizures 7/2017    Anemia     Aortic atherosclerosis 04/17/2024    Cancer of breast 10/2020    s/p bilateral mastectomy for  T1b N0 stage IA breast cancer October 2020    CLL (chronic lymphocytic leukemia) 09/30/2022 6/22/22 - PB flow cytometry  Immunophenotyping of peripheral blood detects a distinct kappa light chain restricted monoclonal B-cell population  (calculated at 2.44x10 9 /L, from the most recent CBC showing a total WBC of  7.35 K/uL with 61% total lymphocytes)  with a CLL phenotype (coexpression  of CD19, CD5, CD23 and dim CD20). CD22 (FITC), FMC-7 and CD38 are negative in this population.    Controlled type 2 diabetes mellitus without complication, without long-term current use of insulin 11/30/2021    COPD (chronic obstructive pulmonary disease)     Depression     Diverticulitis     Fatty liver     GERD (gastroesophageal reflux disease)     Hyperlipidemia     Hypertension     Pancreatitis     Peptic ulcer disease     Polysubstance abuse     Posterior reversible encephalopathy syndrome     Sarcoidosis of lung     over 30 yrs ago    Suicide attempt        Past Surgical History:   Procedure Laterality Date    APPENDECTOMY      BILATERAL MASTECTOMY Bilateral 10/29/2020    Procedure: MASTECTOMY, BILATERAL;  Surgeon: Baylee Kevin MD;  Location: [address];  Service: General;  Laterality: Bilateral;    BREAST REVISION SURGERY Bilateral 2/11/2021    Procedure: BREAST REVISION SURGERY;  Surgeon: Scottie Johnson MD;  Location: [address];  Service: Plastics;  Laterality: Bilateral;    COLONOSCOPY N/A 7/28/2017    Procedure: COLONOSCOPY;  Surgeon: Aaron Alvarado MD;  Location: [facility];  Service: Endoscopy;  Laterality: N/A;    ESOPHAGOGASTRODUODENOSCOPY  10/7/2016, 11/6/2014    2016 - gastritis, duodenitis, 2014 erosive gastritis    ESOPHAGOGASTRODUODENOSCOPY N/A 2/11/2020    Procedure: ESOPHAGOGASTRODUODENOSCOPY (EGD);  Surgeon: Fawn Garrido MD;  Location: [facility];  Service: Endoscopy;  Laterality: N/A;    ESOPHAGOGASTRODUODENOSCOPY N/A 4/19/2021    Procedure: EGD (ESOPHAGOGASTRODUODENOSCOPY);  Surgeon: Paramjit Martino MD;  Location: [facility];  Service: Endoscopy;  Laterality: N/A;    FLEXIBLE SIGMOIDOSCOPY  11/06/2014    colitis    HYSTERECTOMY      IMPLANTATION OF PERMANENT SACRAL NERVE STIMULATOR N/A 7/12/2022    Procedure: INSERTION, NEUROSTIMULATOR, PERMANENT, SACRAL;  Surgeon: Juaquin Edwards MD;  Location: [facility] OR [address] [place] [address];  Service: Urology;  Laterality: N/A;  1hr     INJECTION FOR SENTINEL NODE IDENTIFICATION Right 10/29/2020    Procedure: INJECTION, FOR SENTINEL NODE IDENTIFICATION;  Surgeon: Baylee Kevin MD;  Location: [address];  Service: General;  Laterality: Right;    INJECTION OF JOINT Right 10/10/2019    Procedure: Injection, Joint RIGHT ILIOPSOAS BURSA/TENDON INJECTION AND RIGHT GLUTEAL TENDON INJECTION WITH STEROID AND LIDOCAINE;  Surgeon: Guillaume Rico MD;  Location: [facility];  Service: Pain Management;  Laterality: Right;  NEEDS CONSENT    INSERTION OF BREAST TISSUE EXPANDER Bilateral 10/29/2020    Procedure: INSERTION, TISSUE EXPANDER, BREAST;  Surgeon: Scottie Johnson MD;  Location: [address];  Service: Plastics;  Laterality: Bilateral;  Right breast: 1082 g  Left breast: 1076 g    LIPOSUCTION Bilateral 2/11/2021    Procedure: LIPOSUCTION;  Surgeon: Scottie Johnson MD;  Location: [address];  Service: Plastics;  Laterality: Bilateral;    mediastenoscopy      REPLACEMENT OF IMPLANT OF BREAST Bilateral 2/11/2021    Procedure: REPLACEMENT, IMPLANT, BREAST;  Surgeon: Scottie Johnson MD;  Location: [address];  Service: Plastics;  Laterality: Bilateral;    SENTINEL LYMPH NODE BIOPSY Right 10/29/2020    Procedure: BIOPSY, LYMPH NODE, SENTINEL;  Surgeon: Baylee Kevin MD;  Location: [address];  Service: General;  Laterality: Right;    TONSILLECTOMY N/A 1970    TUBAL LIGATION         Review of patient's allergies indicates:   Allergen Reactions    Lortab [hydrocodone-acetaminophen] Itching    Promethazine Itching and Other (See Comments)    Albuterol      Other Reaction(s): CONFUSION       Current Facility-Administered Medications on File Prior to Encounter   Medication    albuterol sulfate nebulizer solution 2.5 mg     Current Outpatient Medications on File Prior to Encounter   Medication Sig    apixaban (ELIQUIS) 5 mg Tab Take 1 tablet (5 mg total) by mouth 2 (two) times daily.    buPROPion (WELLBUTRIN SR) 200 MG  SR12 Take 1 tablet (200 mg total) by mouth once daily. Please hold for 2 weeks before resuming    ibuprofen (ADVIL,MOTRIN) 400 MG tablet Take 400 mg by mouth 3 (three) times daily.    lancets Misc Use to check blood glucose 4 times daily    levothyroxine (SYNTHROID) 75 MCG tablet Take 1 tablet (75 mcg total) by mouth before breakfast.    losartan (COZAAR) 25 MG tablet Take 25 mg by mouth once daily.    metFORMIN (GLUCOPHAGE-XR) 500 MG ER 24hr tablet Take 500 mg by mouth 2 (two) times daily with meals.    metoprolol succinate (TOPROL-XL) 50 MG 24 hr tablet Take 1 tablet (50 mg total) by mouth once daily.    multivitamin Tab Take 1 tablet by mouth once daily.    omeprazole (PRILOSEC) 20 MG capsule Take 1 capsule (20 mg total) by mouth once daily.    ondansetron (ZOFRAN) 4 MG tablet Take 4 mg by mouth daily as needed for Nausea.    predniSONE (DELTASONE) 20 MG tablet Take 20 mg by mouth once daily.    diazePAM (VALIUM) 10 MG Tab Take 1 tablet (10 mg total) by mouth 3 (three) times daily for 1 day, THEN 1 tablet (10 mg total) 2 (two) times a day for 1 day, THEN 1 tablet (10 mg total) once daily for 1 day, THEN 1 tablet (10 mg total) once daily for 1 day.    EScitalopram oxalate (LEXAPRO) 20 MG tablet Take 1 tablet (20 mg total) by mouth once daily.    folic acid (FOLVITE) 1 MG tablet Take 1 tablet (1 mg total) by mouth once daily.    gabapentin (NEURONTIN) 300 MG capsule Take 1 capsule (300 mg total) by mouth 3 (three) times daily. (Patient taking differently: Take 400 mg by mouth 3 (three) times daily.)    OLANZapine (ZYPREXA) 7.5 MG tablet Take 1 tablet (7.5 mg total) by mouth every evening.    traZODone (DESYREL) 100 MG tablet Take 2 tablets (200 mg total) by mouth every evening.    [DISCONTINUED] omeprazole (PRILOSEC OTC) 20 MG tablet Take 20 mg by mouth once daily.     Family History       Problem Relation (Age of Onset)    Breast cancer Maternal Aunt, Daughter    Colon cancer Maternal Uncle    Diabetes Father,  Mother    Heart attack Father    Hypertension Father, Mother          Tobacco Use    Smoking status: Every Day     Current packs/day: 0.00     Average packs/day: 0.5 packs/day for 30.0 years (15.0 ttl pk-yrs)     Types: Vaping with nicotine, Cigarettes     Start date: 2/1/1991     Last attempt to quit: 2/1/2021     Years since quitting: 3.5    Smokeless tobacco: Never    Tobacco comments:     Patient is currently smoking 10 cigarettes a day, declines nicotine patches   Substance and Sexual Activity    Alcohol use: Yes     Comment: vodka daily (half a regular bottle) for 4 days    Drug use: Yes     Types: Marijuana     Comment: gummies    Sexual activity: Yes     Birth control/protection: Surgical     Review of Systems   Gastrointestinal:  Positive for nausea and vomiting.   Neurological:  Positive for light-headedness.     Objective:     Vital Signs (Most Recent):  Temp: 97.8 °F (36.6 °C) (08/05/24 1918)  Pulse: 101 (08/05/24 2117)  Resp: 14 (08/05/24 2103)  BP: (!) 149/65 (08/05/24 2032)  SpO2: 100 % (08/05/24 2103) Vital Signs (24h Range):  Temp:  [97.6 °F (36.4 °C)-97.9 °F (36.6 °C)] 97.8 °F (36.6 °C)  Pulse:  [] 101  Resp:  [13-24] 14  SpO2:  [95 %-100 %] 100 %  BP: ()/(43-79) 149/65     Weight: 77.1 kg (170 lb)  Body mass index is 27.44 kg/m².     Physical Exam  Vitals and nursing note reviewed.   Neurological:      Mental Status: She is alert.                Significant Labs: All pertinent labs within the past 24 hours have been reviewed.  ABGs:   Recent Labs   Lab 08/05/24  1642 08/05/24 1910 08/05/24 2032   PH 7.236* 7.333* 7.358   PCO2 53.3* 48.7* 49.0*   HCO3 22.6* 25.9 27.5   POCSATURATED 85 78 83   BE -5* 0 2   PO2 60 46 51     CBC:   Recent Labs   Lab 08/05/24  1229 08/05/24  1642   WBC 8.44  --    HGB 8.1*  --    HCT 29.8* 30*   PLT 70*  --      CMP:   Recent Labs   Lab 08/05/24  1229   *   K 4.9      CO2 18*   GLU 69*   BUN 16   CREATININE 1.4   CALCIUM 8.3*   PROT 5.9*    ALBUMIN 3.5   BILITOT 0.4   ALKPHOS 39*   AST 49*   ALT 24   ANIONGAP 17*     Cardiac Markers:   Recent Labs   Lab 08/05/24  1229   BNP 47     Troponin:   Recent Labs   Lab 08/05/24  1229   TROPONINI 0.013     Urine Studies:   Recent Labs   Lab 08/05/24  1443   COLORU Yellow   APPEARANCEUA Clear   PHUR 7.0   SPECGRAV 1.020   PROTEINUA 1+*   GLUCUA Negative   KETONESU 2+*   BILIRUBINUA Negative   OCCULTUA Negative   NITRITE Negative   LEUKOCYTESUR Negative   RBCUA 2   WBCUA 2   BACTERIA Rare   SQUAMEPITHEL 0   HYALINECASTS 1       Significant Imaging: I have reviewed all pertinent imaging results/findings within the past 24 hours.  Imaging Results              X-Ray Chest AP Portable (Final result)  Result time 08/05/24 13:09:02      Final result by Luis Holder MD (08/05/24 13:09:02)                   Impression:      See above      Electronically signed by: Luis Holder MD  Date:    08/05/2024  Time:    13:09               Narrative:    EXAMINATION:  XR CHEST AP PORTABLE    CLINICAL HISTORY:  Sepsis;    TECHNIQUE:  Single frontal view of the chest was performed.    COMPARISON:  No 06/07/2024 ne    FINDINGS:  Heart size normal.  No significant airspace consolidation pleural effusion identified.                                      Assessment/Plan:     * Hypotension due to hypovolemia  -Initially presented with significant hypotension.   -Resolved with IVF.  -Infectious work up unrevealing including CXR and UA.  -Blood cxs in process.  -LA 2.98>1.91>1.7    Starvation ketoacidosis  In the setting of EtOH use. Bicarb 18, Anion gap 17, Glucose 69, EtOH 155, pH 7.23  -Received thiamine 100mg IV, D10 bolus, D5 bolus.  -Continue IVF, MVI, thiamine, and folic acid daily  -Replete electrolytes PRN.  -Continue IVF.  -Daily CMP.    Alcohol use disorder, severe, dependence  Alcohol withdrawal   -Patient reports drinking 1/2 fifth vodka, last drink on earlier today.  -ETOH level 155  -UDS +benzos,   -Nursing to perform CIWA  assessment and treat for objective signs of DTs.    -Will initiate Valium q.i.d., and utilize p.o. Ativan for objective signs of DTs.    -Initiate folic acid, thiamine, and MVI.    -Discussed the dangers of continued ETOH use with Pt and apparent systemic effects of her abuse.    -Will consult with case management for resources on cessation.    DEVANTE (acute kidney injury)  DEVANTE is likely due to pre-renal azotemia due to dehydration. Baseline creatinine is  0.8-0.9 . Most recent creatinine and eGFR are listed below.  Recent Labs     08/05/24  1229   CREATININE 1.4   EGFRNORACEVR 41.5*      Plan  - DEVANTE is stable  - Avoid nephrotoxins and renally dose meds for GFR listed above  - Monitor urine output, serial BMP, and adjust therapy as needed    Anemia, chronic disease  Anemia is likely due to chronic disease due to Chronic liver disease. Most recent hemoglobin and hematocrit are listed below.  Recent Labs     08/05/24  1229 08/05/24  1642   HGB 8.1*  --    HCT 29.8* 30*     Plan  - Monitor serial CBC: Daily  - Transfuse PRBC if patient becomes hemodynamically unstable, symptomatic or H/H drops below 7/21.  - Patient has not received any PRBC transfusions to date  - Patient's anemia is currently stable    Paroxysmal atrial fibrillation  Patient with Paroxysmal (<7 days) atrial fibrillation which is controlled currently with Beta Blocker. Patient is currently in sinus rhythm.[id number] Score: 3. Anticoagulation indicated. Anticoagulation done with eliquis .    Migraine  -History noted.  -Follows with neurology outpatient.    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  Last A1c reviewed-   Lab Results   Component Value Date     HGBA1C 5.9 (H) 06/09/2024     Most recent fingerstick glucose reviewed-   Recent Labs   Lab 08/05/24  1412 08/05/24  1547 08/05/24  2156 08/06/24  0024   POCTGLUCOSE 95 237* 90 103     Current correctional scale  stable  Maintain anti-hyperglycemic dose as follows-   Antihyperglycemics (From admission, onward)      Start     Stop Route Frequency Ordered    08/05/24 2226  insulin aspart U-100 pen 0-5 Units         -- SubQ Before meals & nightly PRN 08/05/24 2126          Hold Oral hypoglycemics while patient is in the hospital.  -Accuchecks AC/HS  -Diabetic/cardiac diet.    Hypothyroidism  Patient has chronic hypothyroidism. TFTs reviewed-   Lab Results   Component Value Date    TSH 1.637 06/07/2024   . Will continue chronic levothyroxine and adjust for and clinical changes.      COPD (chronic obstructive pulmonary disease)  Patient's COPD is controlled currently.  Patient is currently off COPD Pathway. Continue scheduled inhalers Supplemental oxygen and monitor respiratory status closely.   -PRN duo nebs    VINICIO (obstructive sleep apnea)  -Chronic issue.  -Continue CPAP qhs.    Thrombocytopenia  The likely etiology of thrombocytopenia is liver disease. The patients 3 most recent labs are listed below.  Recent Labs     08/05/24  1229   PLT 70*     Plan  - Will transfuse if platelet count is <50k (if undergoing surgical procedure or have active bleeding).    Hyperlipidemia   Patient is not chronically on statin. Monitor clinically. Last LDL was   Lab Results   Component Value Date    LDLCALC 107.8 04/06/2023        Depression with anxiety  Patient has recurrent depression which is unknown and is currently controlled. Will Continue anti-depressant medications. We will consult psychiatry at this time. Patient does not display psychosis at this time. Continue to monitor closely and adjust plan of care as needed.  -Continue lexapro.    Hypomagnesemia  Patient has Abnormal Magnesium: hypomagnesemia. Will continue to monitor  electrolytes closely. Will replace the affected electrolytes and repeat labs to be done after interventions completed. The patient's magnesium results have been reviewed and are listed below.  Recent Labs   Lab 08/06/24  0306   MG 1.5*        Sarcoidosis  - Patient with documented history of sarcoidosis.  Not currently on treatment.     Cigarette nicotine dependence without complication  Dangers of cigarette smoking were reviewed with patient in detail. Patient was Counseled for 3-10 minutes. Nicotine replacement options were discussed. Nicotine replacement was discussed- not prescribed per patient's request    Fibromyalgia  -Chronic issue.  -Continue gabapentin.    Essential hypertension  Chronic, stable. Latest blood pressure and vitals reviewed-     Temp:  [97.6 °F (36.4 °C)-97.9 °F (36.6 °C)]   Pulse:  []   Resp:  [13-37]   BP: ()/(43-79)   SpO2:  [95 %-100 %] .   Home meds for hypertension were reviewed and noted below.   Hypertension Medications               losartan (COZAAR) 25 MG tablet Take 25 mg by mouth once daily.    metoprolol succinate (TOPROL-XL) 50 MG 24 hr tablet Take 1 tablet (50 mg total) by mouth once daily.            While in the hospital, will manage blood pressure as follows; Adjust home antihypertensive regimen as follows- Hold losartan in the setting of DEVANTE and hypotension. Currently normotensive, so will resume metoprolol.    Will utilize p.r.n. blood pressure medication only if patient's blood pressure greater than 180/110 and she develops symptoms such as worsening chest pain or shortness of breath.      VTE Risk Mitigation (From admission, onward)           Ordered     apixaban tablet 5 mg  2 times daily         08/06/24 0232     IP VTE HIGH RISK PATIENT  Once         08/05/24 2126     Place sequential compression device  Until discontinued         08/05/24 2126                         On 08/05/2024, patient should be placed in hospital observation services under my care in  collaboration with Rosendo Moreno MD.           Clara Goetz NP  Department of Hospital Medicine  [facility] - Emergency Dept

## 2024-08-06 NOTE — PROGRESS NOTES
[facility] ([address] Medicine  Progress Note    Patient Name: Earl Abdul  MRN: [id number]  Patient Class: IP- Inpatient   Admission Date: 8/5/2024  Length of Stay: 1 days  Attending Physician: Tracie Last MD  Primary Care Provider: Andrew Rodriguez MD        Subjective:     Principal Problem:Hypotension due to hypovolemia        HPI:  Earl Abdul is a 64 y.o. female with a PMHx of ETOH abuse, anemia, CLL, DM2, COPD, depression, HTN, HLD, GERD, sarcoidosis, nicotine dependence, and fatty liver presenting due to hypotension and lightheadedness. She took her blood pressure and saw that it was low. States that she feels lightheaded worse with standing. She denies focal weakness, headache, or numbness/tingling. Pt reports she was drinking Vodka today at home, about 1/2 fifth. She reports she is interested in pursuing treatment for her alcohol abuse. Denies SOB, CP, fevers, abdominal pain, leg swelling, dizziness, changes in urination, changes in bowel movements. Reports developing nausea and vomiting after arriving to the ED.     In the ED, pt initially hypotensive which quickly improved with IVF, also noted to be tachypneic and was placed on bipap due to being mildly hypercapnic for <1 hr and was weaned to 2L via nasal cannula, afebrile. CBC with stable anemia and thrombocytopenia. Na 135. Bicarb 18. Anion gap 17. Cr 1.4 (bl 0.8-0.9). Glucose 69. POC glucose 95. Total protein 5.9. BNP 47. Troponin 0.013. POC lactate 2.98>1.91. Lactate 1.7. EtOH 155. Blood cxs in process. pH 7.23>7.35, PCO2 53.3>49. EKG shows SR, 65 bpm, no acute ischemic changes. UA non infectious. CXR negative for acute process. The patient received 1L LR bolus, D10 bolus, D5 bolus, ibuprofen, zofran, IV thiamine, and IV valium 5mg.     Overview/Hospital Course:  Pt placed in observation for hypotension altered mental status that is likely secondary to intoxicated state. Pt initially acidotic which was resolved  w/ Bipap. CXR w/o acute process. Infectious w/u negative. Serum ETOH 155. UDS w/ presumptive amphetamines and benzos. Pt treated w/ IV dextrose, IV thiamine, and valium taper. Addiction psych consulted.     Interval History: NAEON. Pt seen and evaluated at bedside this am. Pt doing well and feeling improved this am but continues to report some depressed mood. Psychiatry placed recs for valium taper.     Review of Systems   Constitutional:  Negative for appetite change, chills, diaphoresis, fatigue and fever.   HENT:  Negative for congestion and rhinorrhea.    Eyes:  Negative for photophobia and visual disturbance.   Respiratory:  Negative for cough, shortness of breath and wheezing.    Cardiovascular:  Negative for chest pain, palpitations and leg swelling.   Gastrointestinal:  Positive for nausea. Negative for abdominal distention, abdominal pain, diarrhea and vomiting.   Genitourinary:  Negative for dysuria, frequency and hematuria.   Musculoskeletal:  Negative for back pain, myalgias and neck pain.   Skin:  Negative for color change, pallor, rash and wound.   Neurological:  Negative for syncope, weakness, light-headedness and headaches.   Psychiatric/Behavioral:  Negative for confusion and hallucinations. The patient is not nervous/anxious.      Objective:     Vital Signs (Most Recent):  Temp: 98.2 °F (36.8 °C) (08/06/24 0730)  Pulse: 74 (08/06/24 0900)  Resp: 19 (08/06/24 0900)  BP: (!) 159/72 (08/06/24 0900)  SpO2: 100 % (08/06/24 0900) Vital Signs (24h Range):  Temp:  [97.6 °F (36.4 °C)-98.2 °F (36.8 °C)] 98.2 °F (36.8 °C)  Pulse:  [] 74  Resp:  [13-37] 19  SpO2:  [95 %-100 %] 100 %  BP: ()/(43-79) 159/72     Weight: 77.1 kg (170 lb)  Body mass index is 27.44 kg/m².    Intake/Output Summary (Last 24 hours) at 8/6/2024 1114  Last data filed at 8/6/2024 0622  Gross per 24 hour   Intake 2262.33 ml   Output 600 ml   Net 1662.33 ml         Physical Exam  Vitals and nursing note reviewed.    Constitutional:       General: She is not in acute distress.     Appearance: She is not toxic-appearing or diaphoretic.   HENT:      Head: Normocephalic and atraumatic.      Mouth/Throat:      Mouth: Mucous membranes are moist.   Eyes:      Pupils: Pupils are equal, round, and reactive to light.   Cardiovascular:      Rate and Rhythm: Normal rate and regular rhythm.      Pulses: Normal pulses.   Pulmonary:      Effort: Pulmonary effort is normal. No respiratory distress.      Breath sounds: No wheezing, rhonchi or rales.   Abdominal:      General: Bowel sounds are normal. There is no distension.      Palpations: Abdomen is soft.      Tenderness: There is no abdominal tenderness. There is no guarding.   Musculoskeletal:         General: Normal range of motion.      Cervical back: Normal range of motion.      Right lower leg: No edema.      Left lower leg: No edema.   Skin:     General: Skin is warm and dry.      Capillary Refill: Capillary refill takes less than 2 seconds.   Neurological:      General: No focal deficit present.      Mental Status: She is alert and oriented to person, place, and time.      Motor: Tremor present.   Psychiatric:         Attention and Perception: Attention normal.         Mood and Affect: Affect is flat.         Behavior: Behavior is withdrawn. Behavior is cooperative.             Significant Labs: All pertinent labs within the past 24 hours have been reviewed.    Significant Imaging: I have reviewed all pertinent imaging results/findings within the past 24 hours.    Assessment/Plan:      * Alcohol use disorder, severe, dependence  Alcohol withdrawal   -Patient reports drinking 1/2 fifth vodka, last drink on earlier today.  -ETOH level 155  -UDS +benzos,   -Nursing to perform CIWA assessment and treat for objective signs of DTs.    -Valium 10 mg [id number] for one day --> 10 mg [id number] for one day --> 10 mg Q12 hours for one day --> 5 mg Q12 hours for one day as tolerated. (A)    2.   Continue Trazadone 200 mg and Lexapro 20 mg as ascheduled (C)  -Initiate folic acid, thiamine, and MVI.    -Discussed the dangers of continued ETOH use with Pt and apparent systemic effects of her abuse.    -Will consult with case management for resources on cessation.    Anemia, chronic disease  Anemia is likely due to chronic disease due to Chronic liver disease. Most recent hemoglobin and hematocrit are listed below.  Recent Labs     08/05/24  1229 08/05/24  1642 08/06/24  0306   HGB 8.1*  --  8.9*   HCT 29.8* 30* 32.8*       Plan  - Monitor serial CBC: Daily  - Transfuse PRBC if patient becomes hemodynamically unstable, symptomatic or H/H drops below 7/21.  - Patient has not received any PRBC transfusions to date  - Patient's anemia is currently stable    Paroxysmal atrial fibrillation  Patient with Paroxysmal (<7 days) atrial fibrillation which is controlled currently with Beta Blocker. Patient is currently in sinus rhythm.[id number] Score: 3. Anticoagulation indicated. Anticoagulation done with eliquis .    Migraine  -History noted.  -Follows with neurology outpatient.    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 Bicarb 18, Anion gap 17, Glucose 69, EtOH 155, pH 7.23  -Received thiamine 100mg IV, D10 bolus, D5 bolus.  -Continue IVF, MVI, thiamine, and folic acid daily  -Replete electrolytes PRN.  -Continue IVF.  -Daily CMP.    Type 2 diabetes mellitus without complication, without long-term current use of insulin  Patient's FSGs are uncontrolled due to hypoglycemia likely due to poor oral intake on current medication regimen.  Last A1c reviewed-   Lab Results   Component Value Date    HGBA1C 5.9 (H) 06/09/2024     Most recent fingerstick glucose reviewed-   Recent Labs   Lab 08/05/24  1547 08/05/24  2156 08/06/24  0024 08/06/24  0842   POCTGLUCOSE 237* 90 103 130*        Current correctional scale  stable  Maintain anti-hyperglycemic dose as follows-   Antihyperglycemics (From admission, onward)      Start     Stop Route Frequency Ordered    08/05/24 2226  insulin aspart U-100 pen 0-5 Units         -- SubQ Before meals & nightly PRN 08/05/24 2126          Hold Oral hypoglycemics while patient is in the hospital.  -Accuchecks AC/HS  -Diabetic/cardiac diet.    Hypothyroidism  Patient has chronic hypothyroidism. TFTs reviewed-   Lab Results   Component Value Date    TSH 1.637 06/07/2024   Will continue chronic levothyroxine and adjust for and clinical changes.    COPD (chronic obstructive pulmonary disease)  Patient's COPD is controlled currently.  Patient is currently off COPD Pathway. Continue scheduled inhalers Supplemental oxygen and monitor respiratory status closely.   -PRN duo nebs    VINICIO (obstructive sleep apnea)  -Chronic issue.  -Continue CPAP qhs.    Thrombocytopenia  The likely etiology of thrombocytopenia is liver disease. The patients 3 most recent labs are listed below.  Recent Labs     08/05/24  1229 08/06/24  0306   PLT 70* 56*       Plan  - Will transfuse if platelet count is <50k (if undergoing surgical procedure or have active bleeding).    Hyperlipidemia   Patient is not chronically on statin. Monitor clinically. Last LDL was   Lab Results   Component Value Date    LDLCALC 107.8 04/06/2023        Depression with anxiety  Patient has recurrent depression which is unknown and is currently controlled. Will Continue anti-depressant medications. We will consult psychiatry at this time. Patient does not display psychosis at this time. Continue to monitor closely and adjust plan of care as needed.  -Continue lexapro.    Hypotension due to hypovolemia  -Initially presented with significant hypotension.   -Resolved with IVF.  -Infectious work up unrevealing including CXR and UA.  -Blood cxs in process.  -LA 2.98>1.91>1.7    Hypomagnesemia  Patient has Abnormal  Magnesium: hypomagnesemia. Will continue to monitor electrolytes closely. Will replace the affected electrolytes and repeat labs to be done after interventions completed. The patient's magnesium results have been reviewed and are listed below.  Recent Labs   Lab 08/06/24  0306   MG 1.5*          Hypophosphatemia  Patient has Abnormal Phosphorus: hypophosphatemia. Will continue to monitor electrolytes closely. Will replace the affected electrolytes and repeat labs to be done after interventions completed. The patient's phosphorus results have been reviewed and are listed below.  Recent Labs   Lab 08/06/24  0306   PHOS 1.7*        DEVANTE (acute kidney injury)  DEVANTE is likely due to pre-renal azotemia due to dehydration. Baseline creatinine is  0.8-0.9 . Most recent creatinine and eGFR are listed below.  Recent Labs     08/05/24  1229 08/06/24  0001 08/06/24  0306   CREATININE 1.4 1.5* 1.4   EGFRNORACEVR 41.5* 38.2* 41.5*        Plan  - DEVANTE is stable  - Avoid nephrotoxins and renally dose meds for GFR listed above  - Monitor urine output, serial BMP, and adjust therapy as needed    Sarcoidosis  - Patient with documented history of sarcoidosis.  Not currently on treatment.     Cigarette nicotine dependence without complication  Dangers of cigarette smoking were reviewed with patient in detail. Patient was Counseled for 3-10 minutes. Nicotine replacement options were discussed. Nicotine replacement was discussed- not prescribed per patient's request    Fibromyalgia  -Chronic issue.  -Continue gabapentin.    Essential hypertension  Chronic, stable. Latest blood pressure and vitals reviewed-     Temp:  [97.6 °F (36.4 °C)-97.9 °F (36.6 °C)]   Pulse:  []   Resp:  [13-37]   BP: ()/(43-79)   SpO2:  [95 %-100 %] .   Home meds for hypertension were reviewed and noted below.   Hypertension Medications               losartan (COZAAR) 25 MG tablet Take 25 mg by mouth once daily.    metoprolol succinate (TOPROL-XL) 50 MG 24  hr tablet Take 1 tablet (50 mg total) by mouth once daily.            While in the hospital, will manage blood pressure as follows; Adjust home antihypertensive regimen as follows- Hold losartan in the setting of DEVANTE and hypotension. Currently normotensive, so will resume metoprolol.    Will utilize p.r.n. blood pressure medication only if patient's blood pressure greater than 180/110 and she develops symptoms such as worsening chest pain or shortness of breath.      VTE Risk Mitigation (From admission, onward)           Ordered     apixaban tablet 5 mg  2 times daily         08/06/24 0232     IP VTE HIGH RISK PATIENT  Once         08/05/24 2126     Place sequential compression device  Until discontinued         08/05/24 2126                    Discharge Planning   BECCA:      Code Status: Full Code   Is the patient medically ready for discharge?:     Reason for patient still in hospital (select all that apply): Patient trending condition, Laboratory test, Treatment, and Consult recommendations                     Aneta Soto PA-C  Department of Hospital Medicine   [facility] ([place])

## 2024-08-06 NOTE — SUBJECTIVE & OBJECTIVE
Past Medical History:   Diagnosis Date    Alcoholism     c/b alcohol withdrawl seizures 7/2017    Anemia     Aortic atherosclerosis 04/17/2024    Cancer of breast 10/2020    s/p bilateral mastectomy for  T1b N0 stage IA breast cancer October 2020    CLL (chronic lymphocytic leukemia) 09/30/2022 6/22/22 - PB flow cytometry  Immunophenotyping of peripheral blood detects a distinct kappa light chain restricted monoclonal B-cell population  (calculated at 2.44x10 9 /L, from the most recent CBC showing a total WBC of  7.35 K/uL with 61% total lymphocytes)  with a CLL phenotype (coexpression of CD19, CD5, CD23 and dim CD20). CD22 (FITC), FMC-7 and CD38 are negative in this population.    Controlled type 2 diabetes mellitus without complication, without long-term current use of insulin 11/30/2021    COPD (chronic obstructive pulmonary disease)     Depression     Diverticulitis     Fatty liver     GERD (gastroesophageal reflux disease)     Hyperlipidemia     Hypertension     Pancreatitis     Peptic ulcer disease     Polysubstance abuse     Posterior reversible encephalopathy syndrome     Sarcoidosis of lung     over 30 yrs ago    Suicide attempt        Past Surgical History:   Procedure Laterality Date    APPENDECTOMY      BILATERAL MASTECTOMY Bilateral 10/29/2020    Procedure: MASTECTOMY, BILATERAL;  Surgeon: Baylee Kevin MD;  Location: [address];  Service: General;  Laterality: Bilateral;    BREAST REVISION SURGERY Bilateral 2/11/2021    Procedure: BREAST REVISION SURGERY;  Surgeon: Scottie Johnson MD;  Location: [address];  Service: Plastics;  Laterality: Bilateral;    COLONOSCOPY N/A 7/28/2017    Procedure: COLONOSCOPY;  Surgeon: Aaron Alvarado MD;  Location: [facility];  Service: Endoscopy;  Laterality: N/A;    ESOPHAGOGASTRODUODENOSCOPY  10/7/2016, 11/6/2014    2016 - gastritis, duodenitis, 2014 erosive gastritis    ESOPHAGOGASTRODUODENOSCOPY N/A 2/11/2020    Procedure:  ESOPHAGOGASTRODUODENOSCOPY (EGD);  Surgeon: Fawn Garrido MD;  Location: [facility] ENDO;  Service: Endoscopy;  Laterality: N/A;    ESOPHAGOGASTRODUODENOSCOPY N/A 4/19/2021    Procedure: EGD (ESOPHAGOGASTRODUODENOSCOPY);  Surgeon: Paramjit Martino MD;  Location: [facility] ENDO;  Service: Endoscopy;  Laterality: N/A;    FLEXIBLE SIGMOIDOSCOPY  11/06/2014    colitis    HYSTERECTOMY      IMPLANTATION OF PERMANENT SACRAL NERVE STIMULATOR N/A 7/12/2022    Procedure: INSERTION, NEUROSTIMULATOR, PERMANENT, SACRAL;  Surgeon: Juaquin Edwards MD;  Location: [facility] OR [address] [place] [address];  Service: Urology;  Laterality: N/A;  1hr    INJECTION FOR SENTINEL NODE IDENTIFICATION Right 10/29/2020    Procedure: INJECTION, FOR SENTINEL NODE IDENTIFICATION;  Surgeon: Baylee Kevin MD;  Location: [facility] OR [address] [place] [address];  Service: General;  Laterality: Right;    INJECTION OF JOINT Right 10/10/2019    Procedure: Injection, Joint RIGHT ILIOPSOAS BURSA/TENDON INJECTION AND RIGHT GLUTEAL TENDON INJECTION WITH STEROID AND LIDOCAINE;  Surgeon: Guillaume Rico MD;  Location: [facility] PAIN MGT;  Service: Pain Management;  Laterality: Right;  NEEDS CONSENT    INSERTION OF BREAST TISSUE EXPANDER Bilateral 10/29/2020    Procedure: INSERTION, TISSUE EXPANDER, BREAST;  Surgeon: Scottie Johnson MD;  Location: [address];  Service: Plastics;  Laterality: Bilateral;  Right breast: 1082 g  Left breast: 1076 g    LIPOSUCTION Bilateral 2/11/2021    Procedure: LIPOSUCTION;  Surgeon: Scottie Johnson MD;  Location: [facility] OR [address] [place] [address];  Service: Plastics;  Laterality: Bilateral;    mediastenoscopy      REPLACEMENT OF IMPLANT OF BREAST Bilateral 2/11/2021    Procedure: REPLACEMENT, IMPLANT, BREAST;  Surgeon: Scottie Johnson MD;  Location: [facility] OR [address] [place] [address];  Service: Plastics;  Laterality: Bilateral;    SENTINEL LYMPH NODE BIOPSY Right 10/29/2020    Procedure: BIOPSY, LYMPH NODE, SENTINEL;  Surgeon: Baylee Kevin MD;  Location: [facility] OR [address] [place] [address];  Service:  General;  Laterality: Right;    TONSILLECTOMY N/A 1970    TUBAL LIGATION         Review of patient's allergies indicates:   Allergen Reactions    Lortab [hydrocodone-acetaminophen] Itching    Promethazine Itching and Other (See Comments)    Albuterol      Other Reaction(s): CONFUSION       Current Facility-Administered Medications on File Prior to Encounter   Medication    albuterol sulfate nebulizer solution 2.5 mg     Current Outpatient Medications on File Prior to Encounter   Medication Sig    apixaban (ELIQUIS) 5 mg Tab Take 1 tablet (5 mg total) by mouth 2 (two) times daily.    buPROPion (WELLBUTRIN SR) 200 MG SR12 Take 1 tablet (200 mg total) by mouth once daily. Please hold for 2 weeks before resuming    ibuprofen (ADVIL,MOTRIN) 400 MG tablet Take 400 mg by mouth 3 (three) times daily.    lancets Misc Use to check blood glucose 4 times daily    levothyroxine (SYNTHROID) 75 MCG tablet Take 1 tablet (75 mcg total) by mouth before breakfast.    losartan (COZAAR) 25 MG tablet Take 25 mg by mouth once daily.    metFORMIN (GLUCOPHAGE-XR) 500 MG ER 24hr tablet Take 500 mg by mouth 2 (two) times daily with meals.    metoprolol succinate (TOPROL-XL) 50 MG 24 hr tablet Take 1 tablet (50 mg total) by mouth once daily.    multivitamin Tab Take 1 tablet by mouth once daily.    omeprazole (PRILOSEC) 20 MG capsule Take 1 capsule (20 mg total) by mouth once daily.    ondansetron (ZOFRAN) 4 MG tablet Take 4 mg by mouth daily as needed for Nausea.    predniSONE (DELTASONE) 20 MG tablet Take 20 mg by mouth once daily.    diazePAM (VALIUM) 10 MG Tab Take 1 tablet (10 mg total) by mouth 3 (three) times daily for 1 day, THEN 1 tablet (10 mg total) 2 (two) times a day for 1 day, THEN 1 tablet (10 mg total) once daily for 1 day, THEN 1 tablet (10 mg total) once daily for 1 day.    EScitalopram oxalate (LEXAPRO) 20 MG tablet Take 1 tablet (20 mg total) by mouth once daily.    folic acid (FOLVITE) 1 MG tablet Take 1 tablet (1 mg  total) by mouth once daily.    gabapentin (NEURONTIN) 300 MG capsule Take 1 capsule (300 mg total) by mouth 3 (three) times daily. (Patient taking differently: Take 400 mg by mouth 3 (three) times daily.)    OLANZapine (ZYPREXA) 7.5 MG tablet Take 1 tablet (7.5 mg total) by mouth every evening.    traZODone (DESYREL) 100 MG tablet Take 2 tablets (200 mg total) by mouth every evening.    [DISCONTINUED] omeprazole (PRILOSEC OTC) 20 MG tablet Take 20 mg by mouth once daily.     Family History       Problem Relation (Age of Onset)    Breast cancer Maternal Aunt, Daughter    Colon cancer Maternal Uncle    Diabetes Father, Mother    Heart attack Father    Hypertension Father, Mother          Tobacco Use    Smoking status: Every Day     Current packs/day: 0.00     Average packs/day: 0.5 packs/day for 30.0 years (15.0 ttl pk-yrs)     Types: Vaping with nicotine, Cigarettes     Start date: 2/1/1991     Last attempt to quit: 2/1/2021     Years since quitting: 3.5    Smokeless tobacco: Never    Tobacco comments:     Patient is currently smoking 10 cigarettes a day, declines nicotine patches   Substance and Sexual Activity    Alcohol use: Yes     Comment: vodka daily (half a regular bottle) for 4 days    Drug use: Yes     Types: Marijuana     Comment: gummies    Sexual activity: Yes     Birth control/protection: Surgical     Review of Systems   Constitutional:  Positive for fatigue. Negative for appetite change, chills, diaphoresis and fever.   HENT:  Negative for congestion and rhinorrhea.    Eyes:  Negative for photophobia and visual disturbance.   Respiratory:  Negative for cough, shortness of breath and wheezing.    Cardiovascular:  Negative for chest pain, palpitations and leg swelling.   Gastrointestinal:  Positive for nausea and vomiting. Negative for abdominal distention, abdominal pain and diarrhea.   Genitourinary:  Negative for dysuria, frequency and hematuria.   Musculoskeletal:  Negative for back pain, myalgias and  neck pain.   Skin:  Negative for color change, pallor, rash and wound.   Neurological:  Positive for light-headedness. Negative for syncope, weakness and headaches.   Psychiatric/Behavioral:  Negative for confusion and hallucinations. The patient is not nervous/anxious.      Objective:     Vital Signs (Most Recent):  Temp: 97.8 °F (36.6 °C) (08/05/24 1918)  Pulse: 101 (08/05/24 2117)  Resp: 14 (08/05/24 2103)  BP: (!) 149/65 (08/05/24 2032)  SpO2: 100 % (08/05/24 2103) Vital Signs (24h Range):  Temp:  [97.6 °F (36.4 °C)-97.9 °F (36.6 °C)] 97.8 °F (36.6 °C)  Pulse:  [] 101  Resp:  [13-24] 14  SpO2:  [95 %-100 %] 100 %  BP: ()/(43-79) 149/65     Weight: 77.1 kg (170 lb)  Body mass index is 27.44 kg/m².     Physical Exam  Vitals and nursing note reviewed.   Constitutional:       General: She is not in acute distress.     Appearance: She is not toxic-appearing or diaphoretic.   HENT:      Head: Normocephalic and atraumatic.      Mouth/Throat:      Mouth: Mucous membranes are dry.   Eyes:      Pupils: Pupils are equal, round, and reactive to light.   Cardiovascular:      Rate and Rhythm: Regular rhythm. Tachycardia present.      Pulses: Normal pulses.   Pulmonary:      Effort: Pulmonary effort is normal. No respiratory distress.      Breath sounds: No wheezing, rhonchi or rales.   Abdominal:      General: Bowel sounds are normal. There is no distension.      Palpations: Abdomen is soft.      Tenderness: There is no abdominal tenderness. There is no guarding.   Musculoskeletal:         General: Normal range of motion.      Cervical back: Normal range of motion.      Right lower leg: No edema.      Left lower leg: No edema.   Skin:     General: Skin is warm and dry.      Capillary Refill: Capillary refill takes less than 2 seconds.   Neurological:      General: No focal deficit present.      Mental Status: She is alert and oriented to person, place, and time.   Psychiatric:         Attention and Perception:  Attention normal.         Mood and Affect: Affect is flat.         Behavior: Behavior is withdrawn. Behavior is cooperative.              CRANIAL NERVES     CN III, IV, VI   Pupils are equal, round, and reactive to light.       Significant Labs: All pertinent labs within the past 24 hours have been reviewed.  ABGs:   Recent Labs   Lab 08/05/24  1642 08/05/24  1910 08/05/24 2032   PH 7.236* 7.333* 7.358   PCO2 53.3* 48.7* 49.0*   HCO3 22.6* 25.9 27.5   POCSATURATED 85 78 83   BE -5* 0 2   PO2 60 46 51     CBC:   Recent Labs   Lab 08/05/24  1229 08/05/24  1642   WBC 8.44  --    HGB 8.1*  --    HCT 29.8* 30*   PLT 70*  --      CMP:   Recent Labs   Lab 08/05/24  1229   *   K 4.9      CO2 18*   GLU 69*   BUN 16   CREATININE 1.4   CALCIUM 8.3*   PROT 5.9*   ALBUMIN 3.5   BILITOT 0.4   ALKPHOS 39*   AST 49*   ALT 24   ANIONGAP 17*     Cardiac Markers:   Recent Labs   Lab 08/05/24  1229   BNP 47     Troponin:   Recent Labs   Lab 08/05/24  1229   TROPONINI 0.013     Urine Studies:   Recent Labs   Lab 08/05/24  1443   COLORU Yellow   APPEARANCEUA Clear   PHUR 7.0   SPECGRAV 1.020   PROTEINUA 1+*   GLUCUA Negative   KETONESU 2+*   BILIRUBINUA Negative   OCCULTUA Negative   NITRITE Negative   LEUKOCYTESUR Negative   RBCUA 2   WBCUA 2   BACTERIA Rare   SQUAMEPITHEL 0   HYALINECASTS 1       Significant Imaging: I have reviewed all pertinent imaging results/findings within the past 24 hours.  Imaging Results              X-Ray Chest AP Portable (Final result)  Result time 08/05/24 13:09:02      Final result by Luis Holder MD (08/05/24 13:09:02)                   Impression:      See above      Electronically signed by: Luis Holder MD  Date:    08/05/2024  Time:    13:09               Narrative:    EXAMINATION:  XR CHEST AP PORTABLE    CLINICAL HISTORY:  Sepsis;    TECHNIQUE:  Single frontal view of the chest was performed.    COMPARISON:  No 06/07/2024 ne    FINDINGS:  Heart size normal.  No significant airspace  consolidation pleural effusion identified.

## 2024-08-06 NOTE — ASSESSMENT & PLAN NOTE
DEVANTE is likely due to pre-renal azotemia due to dehydration. Baseline creatinine is  0.8-0.9 . Most recent creatinine and eGFR are listed below.  Recent Labs     08/05/24  1229   CREATININE 1.4   EGFRNORACEVR 41.5*      Plan  - DEVANTE is stable  - Avoid nephrotoxins and renally dose meds for GFR listed above  - Monitor urine output, serial BMP, and adjust therapy as needed

## 2024-08-06 NOTE — ASSESSMENT & PLAN NOTE
Patient has recurrent depression which is unknown and is currently controlled. Will Continue anti-depressant medications. We will consult psychiatry at this time. Patient does not display psychosis at this time. Continue to monitor closely and adjust plan of care as needed.  -Continue lexapro.

## 2024-08-07 LAB
ALBUMIN SERPL BCP-MCNC: 3.3 G/DL (ref 3.5–5.2)
ALP SERPL-CCNC: 48 U/L (ref 55–135)
ALT SERPL W/O P-5'-P-CCNC: 21 U/L (ref 10–44)
ANION GAP SERPL CALC-SCNC: 9 MMOL/L (ref 8–16)
AST SERPL-CCNC: 23 U/L (ref 10–40)
BASOPHILS # BLD AUTO: 0.01 K/UL (ref 0–0.2)
BASOPHILS NFR BLD: 0.2 % (ref 0–1.9)
BILIRUB SERPL-MCNC: 0.3 MG/DL (ref 0.1–1)
BUN SERPL-MCNC: 9 MG/DL (ref 8–23)
CALCIUM SERPL-MCNC: 8.4 MG/DL (ref 8.7–10.5)
CHLORIDE SERPL-SCNC: 102 MMOL/L (ref 95–110)
CO2 SERPL-SCNC: 27 MMOL/L (ref 23–29)
CREAT SERPL-MCNC: 1 MG/DL (ref 0.5–1.4)
DIFFERENTIAL METHOD BLD: ABNORMAL
EOSINOPHIL # BLD AUTO: 0.1 K/UL (ref 0–0.5)
EOSINOPHIL NFR BLD: 1.5 % (ref 0–8)
ERYTHROCYTE [DISTWIDTH] IN BLOOD BY AUTOMATED COUNT: 18.3 % (ref 11.5–14.5)
EST. GFR  (NO RACE VARIABLE): >60 ML/MIN/1.73 M^2
GLUCOSE SERPL-MCNC: 176 MG/DL (ref 70–110)
HCT VFR BLD AUTO: 30.5 % (ref 37–48.5)
HGB BLD-MCNC: 8.4 G/DL (ref 12–16)
IMM GRANULOCYTES # BLD AUTO: 0.03 K/UL (ref 0–0.04)
IMM GRANULOCYTES NFR BLD AUTO: 0.6 % (ref 0–0.5)
LYMPHOCYTES # BLD AUTO: 3.1 K/UL (ref 1–4.8)
LYMPHOCYTES NFR BLD: 67.5 % (ref 18–48)
MAGNESIUM SERPL-MCNC: 1.7 MG/DL (ref 1.6–2.6)
MCH RBC QN AUTO: 25 PG (ref 27–31)
MCHC RBC AUTO-ENTMCNC: 27.5 G/DL (ref 32–36)
MCV RBC AUTO: 91 FL (ref 82–98)
MONOCYTES # BLD AUTO: 0.3 K/UL (ref 0.3–1)
MONOCYTES NFR BLD: 7.3 % (ref 4–15)
NEUTROPHILS # BLD AUTO: 1.1 K/UL (ref 1.8–7.7)
NEUTROPHILS NFR BLD: 22.9 % (ref 38–73)
NRBC BLD-RTO: 0 /100 WBC
PHOSPHATE SERPL-MCNC: 2 MG/DL (ref 2.7–4.5)
PLATELET # BLD AUTO: 60 K/UL (ref 150–450)
PMV BLD AUTO: 10.4 FL (ref 9.2–12.9)
POCT GLUCOSE: 130 MG/DL (ref 70–110)
POCT GLUCOSE: 188 MG/DL (ref 70–110)
POCT GLUCOSE: 217 MG/DL (ref 70–110)
POTASSIUM SERPL-SCNC: 3.5 MMOL/L (ref 3.5–5.1)
PROT SERPL-MCNC: 5.4 G/DL (ref 6–8.4)
RBC # BLD AUTO: 3.36 M/UL (ref 4–5.4)
SODIUM SERPL-SCNC: 138 MMOL/L (ref 136–145)
WBC # BLD AUTO: 4.65 K/UL (ref 3.9–12.7)

## 2024-08-07 PROCEDURE — 63600175 PHARM REV CODE 636 W HCPCS: Performed by: NURSE PRACTITIONER

## 2024-08-07 PROCEDURE — 63600175 PHARM REV CODE 636 W HCPCS: Performed by: PHYSICIAN ASSISTANT

## 2024-08-07 PROCEDURE — 99232 SBSQ HOSP IP/OBS MODERATE 35: CPT | Mod: ,,, | Performed by: PSYCHIATRY & NEUROLOGY

## 2024-08-07 PROCEDURE — 25000003 PHARM REV CODE 250: Performed by: PHYSICIAN ASSISTANT

## 2024-08-07 PROCEDURE — 99900035 HC TECH TIME PER 15 MIN (STAT)

## 2024-08-07 PROCEDURE — 25000003 PHARM REV CODE 250: Performed by: HOSPITALIST

## 2024-08-07 PROCEDURE — 94660 CPAP INITIATION&MGMT: CPT

## 2024-08-07 PROCEDURE — 21400001 HC TELEMETRY ROOM

## 2024-08-07 PROCEDURE — 85025 COMPLETE CBC W/AUTO DIFF WBC: CPT | Performed by: NURSE PRACTITIONER

## 2024-08-07 PROCEDURE — 94640 AIRWAY INHALATION TREATMENT: CPT

## 2024-08-07 PROCEDURE — 94761 N-INVAS EAR/PLS OXIMETRY MLT: CPT

## 2024-08-07 PROCEDURE — 36415 COLL VENOUS BLD VENIPUNCTURE: CPT | Performed by: NURSE PRACTITIONER

## 2024-08-07 PROCEDURE — 25000242 PHARM REV CODE 250 ALT 637 W/ HCPCS: Performed by: PHYSICIAN ASSISTANT

## 2024-08-07 PROCEDURE — 25000003 PHARM REV CODE 250: Performed by: NURSE PRACTITIONER

## 2024-08-07 PROCEDURE — 80053 COMPREHEN METABOLIC PANEL: CPT | Performed by: NURSE PRACTITIONER

## 2024-08-07 PROCEDURE — 27000221 HC OXYGEN, UP TO 24 HOURS

## 2024-08-07 PROCEDURE — 94799 UNLISTED PULMONARY SVC/PX: CPT

## 2024-08-07 PROCEDURE — 83735 ASSAY OF MAGNESIUM: CPT | Performed by: NURSE PRACTITIONER

## 2024-08-07 PROCEDURE — 84100 ASSAY OF PHOSPHORUS: CPT | Performed by: NURSE PRACTITIONER

## 2024-08-07 RX ORDER — LEVALBUTEROL INHALATION SOLUTION 0.63 MG/3ML
0.31 SOLUTION RESPIRATORY (INHALATION) EVERY 8 HOURS
Status: DISCONTINUED | OUTPATIENT
Start: 2024-08-07 | End: 2024-08-08 | Stop reason: HOSPADM

## 2024-08-07 RX ORDER — MAGNESIUM SULFATE 1 G/100ML
1 INJECTION INTRAVENOUS ONCE
Status: COMPLETED | OUTPATIENT
Start: 2024-08-07 | End: 2024-08-07

## 2024-08-07 RX ORDER — INSULIN GLARGINE 100 [IU]/ML
14 INJECTION, SOLUTION SUBCUTANEOUS DAILY
Status: DISCONTINUED | OUTPATIENT
Start: 2024-08-07 | End: 2024-08-07

## 2024-08-07 RX ORDER — POTASSIUM CHLORIDE 20 MEQ/1
40 TABLET, EXTENDED RELEASE ORAL ONCE
Status: COMPLETED | OUTPATIENT
Start: 2024-08-07 | End: 2024-08-07

## 2024-08-07 RX ADMIN — APIXABAN 5 MG: 5 TABLET, FILM COATED ORAL at 08:08

## 2024-08-07 RX ADMIN — DIAZEPAM 10 MG: 5 TABLET ORAL at 05:08

## 2024-08-07 RX ADMIN — DIBASIC SODIUM PHOSPHATE, MONOBASIC POTASSIUM PHOSPHATE AND MONOBASIC SODIUM PHOSPHATE 2 TABLET: 852; 155; 130 TABLET ORAL at 04:08

## 2024-08-07 RX ADMIN — ESCITALOPRAM OXALATE 20 MG: 5 TABLET, FILM COATED ORAL at 09:08

## 2024-08-07 RX ADMIN — THERA TABS 1 TABLET: TAB at 09:08

## 2024-08-07 RX ADMIN — INSULIN DETEMIR 14 UNITS: 100 INJECTION, SOLUTION SUBCUTANEOUS at 12:08

## 2024-08-07 RX ADMIN — INSULIN ASPART 2 UNITS: 100 INJECTION, SOLUTION INTRAVENOUS; SUBCUTANEOUS at 11:08

## 2024-08-07 RX ADMIN — GABAPENTIN 300 MG: 300 CAPSULE ORAL at 04:08

## 2024-08-07 RX ADMIN — POTASSIUM CHLORIDE 40 MEQ: 1500 TABLET, EXTENDED RELEASE ORAL at 09:08

## 2024-08-07 RX ADMIN — APIXABAN 5 MG: 5 TABLET, FILM COATED ORAL at 09:08

## 2024-08-07 RX ADMIN — DIAZEPAM 10 MG: 5 TABLET ORAL at 12:08

## 2024-08-07 RX ADMIN — PANTOPRAZOLE SODIUM 40 MG: 40 TABLET, DELAYED RELEASE ORAL at 09:08

## 2024-08-07 RX ADMIN — Medication 100 MG: at 09:08

## 2024-08-07 RX ADMIN — FOLIC ACID 1 MG: 1 TABLET ORAL at 09:08

## 2024-08-07 RX ADMIN — LEVALBUTEROL HYDROCHLORIDE 0.31 MG: 0.63 SOLUTION RESPIRATORY (INHALATION) at 05:08

## 2024-08-07 RX ADMIN — DIBASIC SODIUM PHOSPHATE, MONOBASIC POTASSIUM PHOSPHATE AND MONOBASIC SODIUM PHOSPHATE 2 TABLET: 852; 155; 130 TABLET ORAL at 09:08

## 2024-08-07 RX ADMIN — LORAZEPAM 2 MG: 1 TABLET ORAL at 11:08

## 2024-08-07 RX ADMIN — LEVOTHYROXINE SODIUM 75 MCG: 25 TABLET ORAL at 05:08

## 2024-08-07 RX ADMIN — METOPROLOL SUCCINATE 50 MG: 50 TABLET, EXTENDED RELEASE ORAL at 09:08

## 2024-08-07 RX ADMIN — TRAZODONE HYDROCHLORIDE 200 MG: 50 TABLET ORAL at 08:08

## 2024-08-07 RX ADMIN — MAGNESIUM SULFATE 1 G: 500 INJECTION, SOLUTION INTRAMUSCULAR; INTRAVENOUS at 09:08

## 2024-08-07 RX ADMIN — GABAPENTIN 300 MG: 300 CAPSULE ORAL at 09:08

## 2024-08-07 RX ADMIN — GABAPENTIN 300 MG: 300 CAPSULE ORAL at 08:08

## 2024-08-07 RX ADMIN — DIBASIC SODIUM PHOSPHATE, MONOBASIC POTASSIUM PHOSPHATE AND MONOBASIC SODIUM PHOSPHATE 2 TABLET: 852; 155; 130 TABLET ORAL at 08:08

## 2024-08-07 NOTE — ASSESSMENT & PLAN NOTE
Anemia is likely due to chronic disease due to Chronic liver disease. Most recent hemoglobin and hematocrit are listed below.  Recent Labs     08/05/24  1229 08/05/24  1642 08/06/24  0306 08/07/24  0338   HGB 8.1*  --  8.9* 8.4*   HCT 29.8* 30* 32.8* 30.5*       Plan  - Monitor serial CBC: Daily  - Transfuse PRBC if patient becomes hemodynamically unstable, symptomatic or H/H drops below 7/21.  - Patient has not received any PRBC transfusions to date  - Patient's anemia is currently stable

## 2024-08-07 NOTE — ASSESSMENT & PLAN NOTE
Alcohol withdrawal   -Patient reports drinking 1/2 fifth vodka, last drink on earlier today.  -ETOH level 155  -UDS +benzos,   -Nursing to perform CIWA assessment and treat for objective signs of DTs.    -Valium 10 mg [id number] for one day --> 10 mg [id number] for one day --> 10 mg Q12 hours for one day --> 5 mg Q12 hours for one day as tolerated. (A)    2.  Continue Trazadone 200 mg and Lexapro 20 mg as ascheduled (C)  -Initiate folic acid, thiamine, and MVI.    -Discussed the dangers of continued ETOH use with Pt and apparent systemic effects of her abuse.    -Will consult with case management for resources on cessation.

## 2024-08-07 NOTE — ASSESSMENT & PLAN NOTE
Patient has Abnormal Magnesium: hypomagnesemia. Will continue to monitor electrolytes closely. Will replace the affected electrolytes and repeat labs to be done after interventions completed. The patient's magnesium results have been reviewed and are listed below.  Recent Labs   Lab 08/07/24  0338   MG 1.7

## 2024-08-07 NOTE — ASSESSMENT & PLAN NOTE
Patient's FSGs are uncontrolled due to hypoglycemia likely due to poor oral intake on current medication regimen.  Last A1c reviewed-   Lab Results   Component Value Date    HGBA1C 5.9 (H) 06/09/2024     Most recent fingerstick glucose reviewed-   Recent Labs   Lab 08/07/24  0737 08/07/24  1119   POCTGLUCOSE 188* 217*       Current correctional scale  stable  Maintain anti-hyperglycemic dose as follows-   Antihyperglycemics (From admission, onward)    Start     Stop Route Frequency Ordered    08/07/24 1330  insulin detemir U-100 (Levemir) pen 14 Units         -- SubQ Daily 08/07/24 1221    08/05/24 2226  insulin aspart U-100 pen 0-5 Units         -- SubQ Before meals & nightly PRN 08/05/24 2126        Hold Oral hypoglycemics while patient is in the hospital.  -Accuchecks AC/HS  -Diabetic/cardiac diet.

## 2024-08-07 NOTE — ASSESSMENT & PLAN NOTE
DEVANTE is likely due to pre-renal azotemia due to dehydration. Baseline creatinine is  0.8-0.9 . Most recent creatinine and eGFR are listed below.  Recent Labs     08/06/24  0001 08/06/24  0306 08/07/24  0338   CREATININE 1.5* 1.4 1.0   EGFRNORACEVR 38.2* 41.5* >60.0        Plan  - DEVANTE is stable  - Avoid nephrotoxins and renally dose meds for GFR listed above  - Monitor urine output, serial BMP, and adjust therapy as needed

## 2024-08-07 NOTE — PLAN OF CARE
[facility] - Med Surg ([facility])  Initial Discharge Assessment       Primary Care Provider: Andrew Rodriguez MD    Admission Diagnosis: Somnolence [R40.0]  Hypercapnia [R06.89]  Depression with anxiety [F41.8]  ETOH abuse [F10.10]  Hypoglycemia [E16.2]  History of alcoholism [F10.21]  Chest pain [R07.9]  Hypotension [I95.9]  History of obstructive sleep apnea [Z86.69]  Alcohol withdrawal syndrome with complication [F10.939]  Alcohol use disorder, severe, dependence [F10.20]  Hypotension due to hypovolemia [E86.1]  Dementia associated with alcoholism, unspecified dementia severity, unspecified whether behavioral, psychotic, or mood disturbance or anxiety [F10.27]    Admission Date: 8/5/2024  Expected Discharge Date: 8/7/2024    Transition of Care Barriers: (P) Substance Abuse    Payor: [employer] / Plan: [facility] CHOICE PLUS / Product Type: Commercial /     Extended Emergency Contact Information  Primary Emergency Contact: Henrique Abdul  Address: [address] [place] [address] [place] [address] [facility]  Home Phone: [phone]  Mobile Phone: [phone]  Relation: Spouse   needed? No  Secondary Emergency Contact: Carlos Suazo  Mobile Phone: [phone]  Relation: Son    Discharge Plan A: (P) Home with family  Discharge Plan B: (P) Home with family      [id number] - [place] - [address] AT [facility] [place] & [facility]  [address]  [facility]  [place] [address]  Phone: [phone] Fax: [phone]      Initial Assessment (most recent)       Adult Discharge Assessment - 08/07/24 0949          Discharge Assessment    Assessment Type Discharge Planning Assessment (P)      Confirmed/corrected address, phone number and insurance Yes (P)      Confirmed Demographics Correct on Facesheet (P)      Source of Information family (P)      If unable to respond/provide information was family/caregiver contacted? Yes (P)      Contact Name/Number Henrique Abdul  spouse, [phone] (P)      Communicated BECCA with patient/caregiver No (P)      Reason For Admission Alcohol use disorder, severe dependence (P)      People in Home spouse (P)      Facility Arrived From: n/a (P)      Do you expect to return to your current living situation? Yes (P)      Do you have help at home or someone to help you manage your care at home? Yes (P)      Who are your caregiver(s) and their phone number(s)? Henrique Chantell, spouse, [phone] (P)      Prior to hospitilization cognitive status: Unable to Assess (P)      Current cognitive status: Unable to Assess (P)      Walking or Climbing Stairs Difficulty no (P)      Dressing/Bathing Difficulty no (P)      Home Accessibility stairs within home (P)      Stairs, Within Home, Primary Pt able to navigate stairs (P)      Stairs Comment, Within Home, Primary Pt with no trouble navigating stairs (P)      Home Layout Bathroom on 2nd floor;Bedroom on 2nd floor (P)      Equipment Currently Used at Home none (P)      Readmission within 30 days? No (P)      Do you currently have service(s) that help you manage your care at home? Yes (P)      Name and Contact number of agency [facility] (P)      Is the pt/caregiver preference to resume services with current agency Yes (P)      Do you take prescription medications? Yes (P)      Do you have prescription coverage? Yes (P)      Coverage [employer] (P)      Do you have any problems affording any of your prescribed medications? No (P)      Who is going to help you get home at discharge? family (P)      How do you get to doctors appointments? family or friend will provide (P)      Are you on dialysis? No (P)      Do you take coumadin? No (P)      Discharge Plan A Home with family (P)      Discharge Plan B Home with family (P)      DME Needed Upon Discharge  none (P)      Discharge Plan discussed with: Spouse/sig other (P)      Name(s) and Number(s) Henrique Abdul, spouse, [phone] (P)      Transition of Care  Barriers Substance Abuse (P)         Physical Activity    On average, how many days per week do you engage in moderate to strenuous exercise (like a brisk walk)? 0 days (P)      On average, how many minutes do you engage in exercise at this level? 0 min (P)         Financial Resource Strain    How hard is it for you to pay for the very basics like food, housing, medical care, and heating? Not hard at all (P)         Housing Stability    In the last 12 months, was there a time when you were not able to pay the mortgage or rent on time? No (P)      At any time in the past 12 months, were you homeless or living in a shelter (including now)? No (P)         Transportation Needs    Has the lack of transportation kept you from medical appointments, meetings, work or from getting things needed for daily living? No (P)         Food Insecurity    Within the past 12 months, you worried that your food would run out before you got the money to buy more. Never true (P)      Within the past 12 months, the food you bought just didn't last and you didn't have money to get more. Never true (P)         Stress    Do you feel stress - tense, restless, nervous, or anxious, or unable to sleep at night because your mind is troubled all the time - these days? To some extent (P)         Social Isolation    How often do you feel lonely or isolated from those around you?  Sometimes (P)         Alcohol Use    Q1: How often do you have a drink containing alcohol? 4 or more times a week (P)      Q2: How many drinks containing alcohol do you have on a typical day when you are drinking? 10 or more (P)      Q3: How often do you have six or more drinks on one occasion? Daily or almost daily (P)         Utilities    In the past 12 months has the electric, gas, oil, or water company threatened to shut off services in your home? No (P)         Health Literacy    How often do you need to have someone help you when you read instructions, pamphlets, or  other written material from your doctor or pharmacy? Often (P)         OTHER    Name(s) of People in Home Henrique Abdul, spouse, [phone] (P)                  YAMILETH spoke with Henrique Abdul, spouse, [phone].  Pt lives at home with , was recently released from alcohol treatment [facility] in [place], 2 weeks ago.  Family will drive her home, and  takes her to MD appts.  Pt has had [facility] stay with her while  is gone; pt has weakness and  states he feels better with her having some supervision while he's gone.  No DME, coumadin or HD.  Pt is physically independent with bathing, dressing; CG states that when pt is drinking, she doesn't bathe.  CG c/o pt with alcoholism, declines resources, states they already have sufficient resources.  CG c/o pt with social isolation when she drinks.  CG declines resources, states pt would not use them.    11:54 AM  YAMILETH spoke with Henrique, instructed that MD has put in d/c orders, pt to be d/c'd today.  Henrique states that he has questions about that since pt just got a couple of medications.  He states he will discuss with nurse.    1:04 PM  DC orders have been removed.  Per MD, patient is still having s/s alcohol withdrawal.  YAMILETH spoke with pt in room, instructed that d/c had been held.  Pt v/u.    DAYTON PryorN, BS, RN, CCM      Discharge Plan A and Plan B have been determined by review of patient's clinical status, future medical and therapeutic needs, and coverage/benefits for post-acute care in coordination with multidisciplinary team members.

## 2024-08-07 NOTE — ASSESSMENT & PLAN NOTE
Chronic, stable. Latest blood pressure and vitals reviewed-     Temp:  [97.6 °F (36.4 °C)-98.4 °F (36.9 °C)]   Pulse:  [69-87]   Resp:  [18]   BP: (115-181)/(70-93)   SpO2:  [94 %-100 %] .   Home meds for hypertension were reviewed and noted below.   Hypertension Medications               losartan (COZAAR) 25 MG tablet Take 25 mg by mouth once daily.    metoprolol succinate (TOPROL-XL) 50 MG 24 hr tablet Take 1 tablet (50 mg total) by mouth once daily.     While in the hospital, will manage blood pressure as follows; Adjust home antihypertensive regimen as follows- Hold losartan in the setting of DEVANTE and hypotension. Currently normotensive, so will resume metoprolol.    Will utilize p.r.n. blood pressure medication only if patient's blood pressure greater than 180/110 and she develops symptoms such as worsening chest pain or shortness of breath.

## 2024-08-07 NOTE — ASSESSMENT & PLAN NOTE
Patient with Paroxysmal (<7 days) atrial fibrillation which is controlled currently with Beta Blocker. Patient is currently in sinus rhythm.[id number] Score: 3. Anticoagulation indicated. Anticoagulation done with eliquis .

## 2024-08-07 NOTE — PROGRESS NOTES
[facility]   DEPARTMENT OF PSYCHIATRY     IDENTIFIERS & DEMOGRAPHICS:     ENCOUNTER: subsequent    -- PATIENT IDENTIFIERS: Earl Abdul  [id number]  1960  64 y.o.  female  -- LOCATION OF PATIENT: unit (med/surg)    -- MODE OF ARRIVAL: ambulance  -- PRESENT WITH PATIENT DURING SESSION: ALONE  -- SOURCES OF INFORMATION: PATIENT, staff, EHR/chart  -- ENCOUNTER PROVIDER: Shadi Haley MD        PRESENTATION:     CHIEF COMPLAINT(S): problematic alcohol use disorder    Per Chart:    65 y/o F w/ comorbid depression and anxiety (on lexapro), EtOH use disorder complicated by prior withdraws and seizures in the past, history of CLL, breast ca, (COPD/T2DM) presented to the ED for light-headiness, dizziness, and stumbling around the house after drinking 1/2 bottle of EtOH yesterday. When she took her blood pressure she was hypotensive, so she called EMS. While in the ED, she found to be altered, hypotensive, hypoglycemic and have level , uTox positive for EtOH, zoya, amphetamine. She was given IV fluids, and valium and was admitted for hypovolemia and EtOH withdrawals. Addiction psych was consulted because patient wanted to psych b/c interested in detoxing and having a sober .     Per Patient:    On initial interview 8/6: Her typical day consist of waking up and take a drink of vodka and smoke in a specific chair in the house. She would go have breakfast and go on with her day. However, her day revolves around drinking and smoking cigarette around the chair. She does not work. Her EtOH history came ever since the passing of her abrupt passing of her oldest son. She has history of EtOH withdrawals complicated by seizures in the past. Reports having multiple attempts of EtOH cessation detox/programs and was previously on naltrexone (in June) and disulfiram in the past. She is not interested in going to rehab, but wishes to have an alcohol  that sits with her daily. She is on lexapro  "daily for depression/anxiety; however, report no improvement in her mood.     COLLATERAL  Per Qualified Provider(s)/Professional(s):    Per CM 8/7: CM spoke with Henrique Abdul, spouse, [phone].  Pt lives at home with , was recently released from alcohol treatment [facility] in [place], 2 weeks ago.  Family will drive her home, and  takes her to MD appts.  Pt has had [place] stay with her while  is gone; pt has weakness and  states he feels better with her having some supervision while he's gone.  No DME, coumadin or HD.  Pt is physically independent with bathing, dressing; CG states that when pt is drinking, she doesn't bathe.  CG c/o pt with alcoholism, declines resources, states they already have sufficient resources.  CG c/o pt with social isolation when she drinks.  CG declines resources, states pt would not use them.      CURRENT EPISODE:  -- EPISODE STATUS: recurrent    -- PRECIPITANTS: unclear, patient recently discharged from inpatient rehab  -- EXACERBATING FACTORS: relationship stressors with , complicated grief of son's death     Interval Hx  overview & progression:     On interview this morning, patient reports doing "better", mood is "fair". She reports good sleep overnight. She notes she still feels a little unsteady on her feet and requires assistance to use the restroom but nothing out of the ordinary. When inquired about her shoulder tremor, she endorses a "detox shakes" which quickly subsides on interview. She states that her  is "upset" with her because she relapsed. We discussed substance use assistance options at discharge. She recalls being counseled on an inpatient rehabilitation program prior to enrolling in an IOP, but patient adamantly refuses. She does not want to be away from home and be "bored", she also notes that she just left a program and does not want to do it again. She is interested in an IOP however, but wants to be " "home. She tends to spend her days alone while her  is at work. We discussed her degree of motivation for such a program and she claims she can do it. She reports wanting to stop drinking but claims her dementia is getting in the way.     REVIEW OF SYSTEMS:    >> SOURCES: patient, chart     Y   Sleep Disturbance/Disruption  +insomnia     Y   Appetite/Weight Change  +decreased appetite     Y   Alterations in Energy Level  +fatigue/anergia     N   Impaired Focus/Concentration   Y   Depressive Symptomatology  +depressed mood, +anhedonia, +amotivation, +hopelessness     Y   Excessive Anxiety/Worry  +generalized anxiety/worry    no panic attacks   N   Dysregulated Mood/Behavior   N   Manic Symptomatology   N   Psychosis   N   Trauma-Related   N   Impulsivity/Compulsivity/Obsessionality   U   Disordered Eating   U   Dissociation   N   Pain   U   Cardiopulmonary Symptoms   Y   Abnormal Involuntary Movements  +tremor    upper extremity resting and intention tremor at baseline    Regarding the current presentation, no other significant issues or complaints are voiced or known at this time.       ADD-ON PSYCHOTHERAPY:     N/A         HISTORY:     >> SOURCES: patient, chart review      -- Pertinent Elements of the Past History:     Patient is followed by an outpatient neurologist for memory loss, dementia, non specific pattern on PET, FTD vs Alzheimer's and Chronic Migraines. She is prescribed Qulipta 60mg PO daily and  Aricept 5mg PO nightly.      -- Psychotropic Trials  detailed/expanded:     Cymbalta, lexapro, trazodone, seroquel, zyprexa, naltrexone    >> SCHEDULED AND PRN MEDS: reviewed/reconciled  see MEDCARD      Allergies:  Lortab [hydrocodone-acetaminophen], Promethazine, and Albuterol     EXAMINATION:     VITALS:  /81   Pulse 70   Temp 97.9 °F (36.6 °C) (Oral)   Resp 18   Ht 5' 6" (1.676 m)   Wt 95 kg (209 lb 7 oz)   " "SpO2 100%   Breastfeeding No   BMI 33.80 kg/m²     MENTAL STATUS EXAMINATION:  Appearance: appears stated age, overweight  in no apparent distress, well-appearing    in hospital garb  Behavior & Attitude:   calm, cooperative, appropriate eye contact  Movements & Motor Activity:   resting tremor observed in bilateral upper extremities, gait deferred  Speech & Language: normal rate, normal volume, normal quantity, normal latency, spontaneous, reciprocal, fluent    Mood: "fair"  Affect: mood congruent    mood congruent, reactive  Thought Process & Associations: linear, goal-directed    slightly ruminative  Thought Content & Perceptions: no responding to internal stimuli    denies SI, HI, paranoia, or AVH  Orientation:   oriented to person, place "[facility]", year "2024", month "August", not to date  Recent & Remote Memory: +short-term impairment (moderate), +long-term impairment (mild)  impaired (recent), impaired (remote)    Attention & Concentration: intact  attentive to conversation, not easily distracted    Fund of Knowledge: intact    Insight: fair    Judgment: adequate            RISK & REGULATORY:      RISK PARAMETERS (current to the encounter/episode  NOT inclusive of past history):     N   Suicidal Ideation/Threats   N   Suicide Attempts/Gestures   N   Homicidal Ideation/Threats   N   Homicidal Behavior   N   Non-Suicidal Self-Injurious Behavior   N   Perpetrated Violence     FIREARMS & WEAPONS:     N   Ready Access to Firearms     SAFETY SCREENINGS:    -- SOCIAL DETERMINANTS OF HEALTH (SDOH): NOT LIMITING Tx/MGMT  -- SERIOUS MENTAL ILLNESS (SMI): PRESENT    -- ONGOING ABUSE: NO - ABSENT    -- PROTECTIVE FACTORS: IDENTIFIED       - SPECIFIC FACTORS IDENTIFIED: accessible support, agrees to monitoring, social supports    -- RISK FACTORS: IDENTIFIED     - SPECIFIC MODIFIABLE FACTORS IDENTIFIED: guilt/worthlessness, interpersonal conflict, substance abuse, social " isolation, psychiatric sx     - SPECIFIC NON-MODIFIABLE FACTORS IDENTIFIED: hx psych tx    -- CONTRACTS FOR SAFETY: unknown  unable to assess  -- FUTURE ORIENTED: YES  -- REMORSE/REGRET: YES    -- CAREGIVER(S)     - SUPPORTIVE & APPROPRIATELY INVOLVED: YES     - MONITORING: AVAILABLE/AGREEABLE     - ADVOCATES FOR COMMUNITY MGMT (safe/appropriate): unknown/unable to assess    -- RISK MITIGATION & PREVENTION:      - INTERVENTIONS: advice/counseling, tx of pathology, [id number]/911/ED (info)     REGULATORY:    -- CARE COORDINATION  the case was discussed and care was coordinated with member(s) of the treatment team.  -- : REVIEWED      INFORMED CONSENT & SHARED DECISION MAKING are the hallmark and bedrock of good clinical care, and as such have been employed and obtained, respectively, to the degree possible.  Discussed, to the extent possible, diagnosis, risks and benefits of proposed treatment (e.g., medication, therapy) vs alternative treatments vs no treatment, potential side effects of these treatments, and the inherent unpredictability of treatment.  Resources have been provided via the patient instructions in the AVS to supplement, augment, and reinforce discussions, counseling, and/or interventions.       - ABILITY TO UNDERSTAND, PARTICIPATE & ENGAGE: adequate     - AGREEABLE TO TREATMENT (consent/assent): the patient consents to treatment     - RELIABILITY/ACCURACY: the patient is deemed to be an inconsistent historian      WARNINGS & PRECAUTIONS:  >> In cases of emergencies (e.g. SI/HI resulting in danger to self or others, functioning deteriorating to the level of grave disability), call 911 or [phone], or present to the emergency department for immediate assistance.    >> Individuals should not operate a motor vehicle or heavy machinery if effects of medications or underlying symptoms/condition impair the ability to do so safely.    >> FULLY comply with ANY/ALL medication as prescribed/instructed and report  ANY/ALL suspected adverse effects to appropriate health care providers.       ASSESSMENT & PLAN:     DIAGNOSES & PROBLEMS:       1.  Alcohol use disorder, severe    2.  Unspecified Neurocognitive Disorder    3.  Depression with anxiety    PSYCHOTROPIC REGIMEN:   (C)=Continue as prescribed  (A)=Adjust as noted  (I)=Iniitate  (D)=Discontinue      1.  Valium taper 10 mg [id number] for one day today --> 10 mg Q12 hours for one day --> 5 mg Q12 hours for one day as tolerated. (A) (C)    2.  Lexapro 20 mg PO daily (C)    3.  Trazodone 200 mg PO daily (C)    -- ASSESSMENT (synthesis  analysis):     65 y/o F with EtOH use disorder w/ withdrawals and seizures in the past, failed multiple attempts of alcoholic cessation/detox programs presenting here with signs and symptoms of alcohol use disorder and alcoholic withdrawals symptoms. Patient was started on a alcoholics withdrawal protocol with valium taper.      - GLOBAL FUNCTIONING: at or near baseline   - STRENGTHS: family support, access to care, access to housing, financial stability   - LIABILITIES: comborbid depression and anxiety, marital issues, limited insight vs motivation for appropriate length of treatment, social isolation, history of failed trials    -- LEGAL (current status  certification criteria):     - DANGER TO SELF: no   - DANGER TO OTHERS: no   - GRAVELY DISABLED: no    -- DISPOSITION  SUMMATION:  With reasonable medical certainty, based on history, chart review, available collateral information, and a present-state examination:    - does not currently meet the criteria for psychiatric hospitalization, as can be successfully managed in a less restrictive level of care or in the community   - PEC is NOT INDICATED - rescind if in place    -- PLAN (goals  recommentations):     With reasonable medical certainty, based on history, chart review, available collateral information, and a present-state examination:  - continue Valium taper as scheduled and current  psychotropics, patient is tolerating well.  - Patient was counseled on benefit of enrolling in an inpatient rehab program prior to attempting IOP again, which she is declining at this time. She is amenable for an IOP program but her insight and ability to commit are questionable at this time considering her recent rehab admission and cognitive issues. Will continue to discuss options available.   - PEC is not indicated  - upon discharge, it is recommended to follow up with an outpatient provider within 1-2 weeks of discharge, but ideally as soon as possible     -- ADDICTION ###  - continue alcohol (or sedative-hypnotic) withdrawal protocol, including supportive measures,frequent monitoring of vitals and CIWA-Ar, and use of PRN +/- standing benzodiazepines (see herein for dosing guidance and/or recommendations)  - provide supportive care as warranted: e.g., fluid and electrolyte replacement, vitamin repletion (thiamine, folic acid, multivitamin), adequate caloric support     -- RECOMMENDATIONS FOR DETOX TAPER: Valium 10 mg [id number] for one day --> 10 mg [id number] for one day --> 10 mg Q12 hours for one day --> 5 mg Q12 hours for one day as tolerated.   - options for medication-assisted treatment (MAT) for alcohol use disorder were discussed  - recommend patient enroll in addiction rehab program after discharge and medical stabilization  - counseled on full abstinence from alcohol and substances of abuse (illicit and prescription)  - full engagement in 12 step (or equivalent) recovery program(s), including meeting attendance and acquisition/maintenance of sponsor  - relapse prevention and motivational interviewing provided  - provided resources for various addiction rehabilitation options as part of aftercare planning      MEDICAL DECISION MAKING:    - RISK: MODERATE     - COMPLEXITY: LOW   - DATA: +review of prior external note(s) from each unique source, +assessment requiring an independent historian   - DIAGNOSTICS: a  diagnostic psychiatric evaluation was performed and responsiveness to treatment was assessed  ability/capacity to respond to treatment: adequate/intact   > LEVEL: LOW    CHART REVIEW: available documentation has been reviewed, and pertinent elements of the chart have been incorporated into this evaluation where appropriate.       DIAGNOSTIC TESTING:      Glu 176 (H)  8/7/2024  Li *   *  TSH 1.637  6/7/2024    HgA1c 5.9 (H)  6/9/2024  VPA *   *   FT4 1.12  3/1/2024    Na 138  8/7/2024  CLZ *   *  WBC 4.65  8/7/2024    Cr 1.0  8/7/2024  ANC 1.1; 22.9 (L);  (L)  8/7/2024   Hgb 8.4 (L)  8/7/2024     BUN 9  8/7/2024  Trop I 0.013  8/5/2024  HCT 30.5 (L)  8/7/2024     GFR >60.0  8/7/2024   CPK 75  5/17/2024  PLT 60 (L)  8/7/2024     Alb 3.3 (L)  8/7/2024   PRL *   *  B12 372  5/17/2024     T Bili 0.3  8/7/2024  Chol 184  4/6/2023  B9 15.9  5/17/2024    ALP 48 (L)  8/7/2024  TGs 161 (H)  4/6/2023  B1 114  *    AST 23  8/7/2024  HDL 44  4/6/2023  Vit D *   *     ALT 21  8/7/2024  .8  4/6/2023  HIV Non-reactive  4/11/2024     INR 1.0  3/2/2024  Baylee *   *   Hep C Non-reactive  4/11/2024    GGT *   *  Lip 38  6/7/2024  RPR *   *    MCV 91  8/7/2024   NH4 46  8/5/2024  UPT *   *      PETH 301  11/13/2023  THC Negative  8/5/2024    ETOH 155 (H)  8/5/2024  DESIREE Negative  8/5/2024    EtG Negative  5/21/2024  AMP Presumptive Positive (A)  8/5/2024     (A)  8/5/2024  OPI Negative  8/5/2024    BZO Presumptive Positive (A)  8/5/2024  MTD Negative  8/5/2024     BAR Negative  8/5/2024  BUP *   *    PCP Negative  8/5/2024  FEN Not Detected  7/18/2023     Results for orders placed or performed during the hospital encounter of 08/05/24   EKG 12-lead    Collection Time: 08/05/24 11:49 AM   Result Value Ref Range    QRS Duration 90 ms    [facility] QTC Calculation 424 ms    Narrative    Test Reason : I95.9,    Vent. Rate : 065 BPM     Atrial Rate : 065 BPM     P-R Int : 166 ms           QRS Dur : 090 ms      QT Int : 408 ms       P-R-T Axes : 088 080 059 degrees     QTc Int : 424 ms    Normal sinus rhythm  Normal ECG  When compared with ECG of 07-JUN-2024 16:04,  No significant change was found  Confirmed by Sg STEPHENSON MD ([id number]) on 8/5/2024 1:40:11 PM    Referred By: System System           Confirmed By:Sg STEPHENSON MD        SEN & LINKS:        Y  = yes/endorses     N  = no/denies     U  = unknown/unable to assess    ADHD   AIMS   AUDIT   AUDIT-C   C-SSRS (Screen)   C-SSRS (Short)   C-SSRS (Full)   DAST   DAST-10   HEATHER-7   MMSE   MoCA   PCL-5   PHQ-9   ROSALINA   YMRS     Consults  [facility] MED/SURG FLEX 16WT     Shadi Haley MD  [facility] PGY-II   Department of Psychiatry  [facility]

## 2024-08-07 NOTE — DISCHARGE INSTRUCTIONS
REFERRAL RECOMMENDATIONS FOR ALCOHOL USE DISORDER      12 STEP PROGRAMS (and similar):     Alcoholics Anonymous (local)  [x] [phone]  [x] www.[place].org for schedules for in-person and online meetings  [x] There are AA meetings throughout the day all over town  [x] AA costs nothing to attend; they pass a basket for donations but this is not required    Alcoholics Anonymous Online Intergroup (national)  [x] www.aa-intergroup.org  [x] Good resource for large, nation-wide meetings  [x] Can also attend smaller, local meetings in other cities  [x] Countless meetings all day and all night  [x] AA costs nothing to attend; they pass a basket for donations but this is not required    Flying Sober - 24/7 zoom meetings for women and coed - sign on anytime, anywhere!  [email or web address]    Online Intergroup of AA - 121 Open AA [place] Meeting - 24/7 zoom meetings  https://aa-intergroup.org/meetings/    LOOKING FOR AN ALTERNATIVE TO 12 STEP PROGRAMS - check out:  SMART Recovery: https://www.smartrecovery.org/about-us  [facility]: https://recoverydharma.org      DETOX UNITS (USUALLY 5-7 DAYS):     [facility]: [address], [place]  [phone], call first to ensure bed availability    [facility]: [address] [place]  [phone], Option 1, call first to ensure bed availability    [place] [facility]: [facility] [address], [place]  [phone] (intake by appointment only)    [facility]: [address], [place]  [phone]      INTENSIVE OUTPATIENT PROGRAMS:     [facility] REC[facility] PRO[facility] (formerly known as the ABU)  [x] [phone], Option 2  [x] [address], [address] 4th Floor, [place] [address]  [x] [email or web address]/[email or web address]-recovery-program  [x] [facility] delivers comprehensive and collaborative treatment for alcohol and substance use disorders.  Excellent program for working professionals or  anyone else seeking recovery.  [x] Requires insurance approval prior to starting program, call number above for more information.  [x] Intensive Outpatient Rehabilitation Program - M-F 9am-3pm - daily groups with psychologists and social workers, sessions with MDs 3x per week   [x] Ambulatory detox and dual diagnosis available    [facility] Intensive Outpatient Program  [x] [phone]  [x] [address] [facility] (the clinic not on [facility])  [x] Call number above for more info and to check insurance requirements    [facility]  [address] [place], [place] [address] [phone]    [facility]:  [address] [facility], [address]?, [place], [place] [address]?, [phone]  [address] [facility]?, [place], [place] [address]?, [phone]    RESIDENTIAL REHABS (USUALLY 28 DAYS):     [facility]: [address], [phone]    [facility]: [address] , [facility]  [phone] (intake by appointment only)    [facility] (men only) [address] [facility], [phone]    [facility] (Female only) [address] [facility], [phone]    [facility]: [address], [facility], men's program [phone], women's program [phone]    [facility]: [address], [facility], [phone]    [facility]: [address] [place], [place], [phone]    [facility]: Men only, [phone], [address] [place] [facility]: [address], [place], [place], [phone]    [facility]: [facility] [place], [place],  [phone]  New Location: [address] [place] [address] [address], [place] [address], [phone]    [facility]:   ?[address]?[place], [place] [address]?[phone]    [place]: [address], [place] [address], [phone]    [place]: [place], [phone]     [facility]: [place], [place], [phone]    [address] [place]: Lucila  RADHA Oakes, [phone]    [facility]: [place], [place], [phone]    [place]: [place], [place], [phone]    Neelima [place]: [address], [place] [address], [phone]    COMMUNITY ADDICTION CLINICS:     [facility]: [address] [facility], [address] [place], -[phone] -or- [address] [place] [address]    [facility] [place]: [address] [facility], [place]  [address]     [facility]: Clinics [phone]; Crisis [phone]    [facility]: [address] [place] [address]    [facility]: [address] [facility] [address]    [facility]: [address] [place] [address],    [facility]: 2nd Floor [address] [place] [address]    [facility]: [address] [facility] [place] [address]    [facility], [address] [place] [address]    [facility]: [address] [place] [address], [place] [phone]  (serves youth 16-23 years old)    [facility] Center: [facility] [place] [phone]    [facility]: [address] [facility], [place] [phone]    [facility]: [address], [place] [phone]    [facility]: [address] [facility], [address], [phone]     [facility]: [address] [facility], [phone], [phone]    RESOURCES IN OTHER [facility]:     [facility]: [address] [place], [phone], [phone]    [facility]: [address] [place] [facility] [address], [address], [phone]    [facility], [address] [place] [address], [phone]    [facility] Health: [address], [place], [phone]    [facility] Behavioral Health, [address] [facility], [phone] ([facility])    [facility], [address] [facility], [phone] ([facility])    [facility] Health, [address], [place], [phone] ([place] [place] [facility], [facility])    [place] [facility], [address], [place], [phone] ([facility])    [facility] Hotline [phone], [phone]    [facility], [address] [facility], [facility], [address] [facility] [address], [facility], [address] [facility], [facility], [address] [facility] [address] [place] [facility], [address], [facility], [address], [address] [phone]    [facility], [address] [facility] [phone]    [facility] for Addiction Recovery, [address] [facility], [phone]    [facility] for Addiction Recovery, [address] [place] [address], [phone]      Qatari SPEAKING (en español):     Información de la reunión de Alcohólicos Anónimos  Cameron [facility], 10:00 am  Habla español  Esta reunión está abierta y cualquiera puede asistir.    Danish speaking Alcoholics anonymous meetings:  El "[place] AA Skype" es un cameron on line de Alcohólicos Anónimos en español. El cameron es mily, gratuito y virtual a través de Skype Audio. El cameron funciona mediante beth llamada grupal de voz, por lo que no se utiliza la videollamada, ni se pueden waqas las imágenes o rostros de los participantes. Hace reji años y medio abrimos el primer Cameron de AA por Sktinoe en [place], amaya actualmente asisten personas desde [place], [place], [place], [place], [place], [place], [place], [place], [place], [place], " [place] y [place], entre otros.    El wilner es muy útil para los alcohólicos que residen en lugares donde no se celebran reuniones de AA, o residen en lugares donde las reuniones de AA son un número limitado de días a la semana, o para aquellos compañeros que se hayan de viaje o que, por cualquier motivo, se hayan convalecientes y no pueden desplazarse. Todos los días nos reuniones a las 21:00 (hora española)    Podéis obtener más información sobre el wilner y george sesiones en la págWeLike web [email or web address]      MENTAL HEALTH/ADDICTIVE DISORDERS:     AA ([phone]), NA ([phone])   National Suicide Prevention Lifeline- Call 1-264.868.2996 Available 24 hours [facility] [phone]; Crisis Line 932-6834 - Call for options A-F:  Intensive Outpatient Treatment/ Day programs   [facility], please contact   [facility], please contact [phone] or [phone] to speak with an admissions counselor.  Behavioral Health Group (Methadone Maintenance)   [address] [place] [address], [phone]  [facility] [place] [address] [phone]  [facility], [address] [place] [address], [phone]  [facility] [phone]  [facility] [place] please contact [phone]  [facility], [facility], 4th floor RADHA Carver [address] Phone: [phone]   Acer  [place]a Office: [address] [address], [phone]  [place] Office: [address] [facility] [address], [place] [address], [phone]  [place] Office: [address] [facility] [place] [address] [phone]    Outpatient Substance Abuse Treatment   Behavioral Health Group (Methadone Maintenance)   [facility] [place] [address], [phone]  [address] [place] [address] [phone]  [facility], [address] [address], [phone]  Acer  [place] Office: [address] [place] [address], [phone]  [place] Office: [address] [facility], [address], [place] [address], [phone]  [place] Office: [address] [place] [address] [phone]  [facility], [address] [place] [address] [phone]   [facility], [address] [facility], [address], [phone]  [facility], [address] [place], [place] [phone]    Residential Substance Abuse Treatment   [facility] [address] [facility], [phone] or [phone]  [facility], [address] [facility], [phone]  [facility] (men only) [address] [place] [address], [phone]  [facility] (women only) [address] [place] [address] [phone]  [facility], [address] [place] [address] [phone]  [facility] (women only), intakes at [address] [facility], [phone]  [facility] (7-day program, $100, [address], [phone], [phone], [phone])  [facility] (Men only, [phone]), [address], [place] (Vets*/Non-Vets)  [facility] (Men only, $400/month program fee) [address] [facility] Juvenal Hussein, [phone]  [facility] (Women over age 39 only), [address] [facility] [place], [phone]    Out of Area:    [place], [address] [place] [address] 36, [place], [place] ([phone])  [facility] (men only), [address] [facility] [place] [address], [phone]  [facility], [address] [place], [place] ([phone])  [address] [place], [place] ([phone])  [facility], [address] [place] [address], [phone]  [facility] (Women only, has to have mental illness, can be homeless or substance abuser), [phone]      [place] RESOURCES:     [place] [facility] Crisis Response  Team:    Region 12 ([place], [place], [place], and [facility]) ([facility])  [phone]      Outpatient Mental Health & Addiction Clinic Resources for both [facility]:    [facility] Mental [facility] Resources  Website: www.[facility].org  Main Number: 136-300-9983    [facility] ([facility])  [address] ([address] [facility]) [place] [address]  [phone]    [facility] ([facility])  [address] ([address] [facility]) [place] [address]  [phone]    [facility]  [address] ([address]) [place] [address]  [phone]    [facility]  [address] ([address] [facility] [place]) [place] [address]  [phone]      Addiction Treatment Resources for both [facility]:    [facility] (Detox, Residential, PHP, IOP, and Aftercare Programs)  [address] [place], [place] [address] [phone]    [facility] (Residential, IOP, Transitional Living, and Aftercare Programs)  [address] [facility] [place] [address]  [phone]    [facility] (Inpatient, Residential, Detox, IOP, Outpatient, and Aftercare Programs)  [address] [place] [id number] [phone] or toll free at [phone]      Outpatient Mental Health Psychotherapy Resources for both [facility]:    Michell Freire, DEMIW  [address]  [place] [address]  [phone]  Specialties: Depression, Anxiety, and Life Transitions    Debra Rosas, PhD  [address]  [place] [address]  [phone]  Specialties: Testing and Evaluation, Education and Learning Disabilities, and ADHD    Loly Hines, DEMIW [facility] [address] [place] [address]  [phone]  Specialties:  Obsessive-Compulsive (OCD), Depression, and Relationship Issues    Marybel Escalante, JOSÉ MIGUEL [address] [place] [facility] [place] [address]  [phone]  Specialties: Trauma & PTSD, Mood Disorders, and Anxiety    Marybel Walker, PhD, [facility] [address] [place] [address], [place] [address] [phone]  Specialties: Family Conflict, Child, and Relationship Issues    Molly Macedo LPC [facility] [place], [place] [address] [phone]  Specialties: Anxiety, Depression, and Anger Management        IN CASE OF SUICIDAL THINKING, call the National Suicide Hotline Number: 988    988 Suicide & Crisis Lifeline: 988 , 7-211-572-TALK (0945)  Provides 24/7, free and confidential support for people in distress, prevention and crisis resources for you or your loved ones, and best practices for professionals.    Call, text or chat.  https://Drive YOYO.org           BENZODIAZEPINE TAPER FOR ALCOHOL WITHDRAWAL   Valium 10 mg L7slkgj for one day --> 10 mg C9wkxoh for one day --> 10 mg Q12 hours for one day --> 5 mg Q12 hours for one day as tolerated.

## 2024-08-07 NOTE — PLAN OF CARE
CM went to pt room to do initial assessment.  Pt fell asleep while speaking with CM.  Unable to do initial assessment.  CM to follow.    DAYTON PryorN, BS, RN, [facility]

## 2024-08-07 NOTE — ASSESSMENT & PLAN NOTE
Patient has Abnormal Phosphorus: hypophosphatemia. Will continue to monitor electrolytes closely. Will replace the affected electrolytes and repeat labs to be done after interventions completed. The patient's phosphorus results have been reviewed and are listed below.  Recent Labs   Lab 08/07/24  0338   PHOS 2.0*

## 2024-08-07 NOTE — PROGRESS NOTES
[facility] ([address] Medicine  Progress Note    Patient Name: Earl Abdul  MRN: [id number]  Patient Class: IP- Inpatient   Admission Date: 8/5/2024  Length of Stay: 2 days  Attending Physician: Tracie Last MD  Primary Care Provider: Andrew Rodriguez MD        Subjective:     Principal Problem:Alcohol use disorder, severe, dependence        HPI:  Earl Abdul is a 64 y.o. female with a PMHx of ETOH abuse, anemia, CLL, DM2, COPD, depression, HTN, HLD, GERD, sarcoidosis, nicotine dependence, and fatty liver presenting due to hypotension and lightheadedness. She took her blood pressure and saw that it was low. States that she feels lightheaded worse with standing. She denies focal weakness, headache, or numbness/tingling. Pt reports she was drinking Vodka today at home, about 1/2 fifth. She reports she is interested in pursuing treatment for her alcohol abuse. Denies SOB, CP, fevers, abdominal pain, leg swelling, dizziness, changes in urination, changes in bowel movements. Reports developing nausea and vomiting after arriving to the ED.     In the ED, pt initially hypotensive which quickly improved with IVF, also noted to be tachypneic and was placed on bipap due to being mildly hypercapnic for <1 hr and was weaned to 2L via nasal cannula, afebrile. CBC with stable anemia and thrombocytopenia. Na 135. Bicarb 18. Anion gap 17. Cr 1.4 (bl 0.8-0.9). Glucose 69. POC glucose 95. Total protein 5.9. BNP 47. Troponin 0.013. POC lactate 2.98>1.91. Lactate 1.7. EtOH 155. Blood cxs in process. pH 7.23>7.35, PCO2 53.3>49. EKG shows SR, 65 bpm, no acute ischemic changes. UA non infectious. CXR negative for acute process. The patient received 1L LR bolus, D10 bolus, D5 bolus, ibuprofen, zofran, IV thiamine, and IV valium 5mg.     Overview/Hospital Course:  Pt placed in observation for hypotension altered mental status that is likely secondary to intoxicated state. Pt initially acidotic which  was resolved w/ Bipap. CXR w/o acute process. Infectious w/u negative. Serum ETOH 155. UDS w/ presumptive amphetamines and benzos. Pt treated w/ IV dextrose, IV thiamine, and valium taper. Addiction psych consulted.     Interval History: NAEON. Pt seen and evaluated at bedside this am. Pt feels improved from yesterday but continues to have depressed mood and tremors. Will continue IP valium taper. Hypertensive today, monitoring closely and will utilize prn for SBP>180, otherwise HDS and afebrile.     Review of Systems   Constitutional:  Negative for appetite change, chills, diaphoresis, fatigue and fever.   HENT:  Negative for congestion and rhinorrhea.    Eyes:  Negative for photophobia and visual disturbance.   Respiratory:  Negative for cough, shortness of breath and wheezing.    Cardiovascular:  Negative for chest pain, palpitations and leg swelling.   Gastrointestinal:  Positive for nausea. Negative for abdominal distention, abdominal pain, diarrhea and vomiting.   Genitourinary:  Negative for dysuria, frequency and hematuria.   Musculoskeletal:  Negative for back pain, myalgias and neck pain.   Skin:  Negative for color change, pallor, rash and wound.   Neurological:  Positive for tremors and weakness. Negative for syncope, light-headedness and headaches.   Psychiatric/Behavioral:  Negative for confusion and hallucinations. The patient is not nervous/anxious.      Objective:     Vital Signs (Most Recent):  Temp: 98.4 °F (36.9 °C) (08/07/24 1112)  Pulse: 87 (08/07/24 1435)  Resp: 18 (08/07/24 1112)  BP: (!) 181/74 (08/07/24 1112)  SpO2: 97 % (08/07/24 1236) Vital Signs (24h Range):  Temp:  [97.6 °F (36.4 °C)-98.4 °F (36.9 °C)] 98.4 °F (36.9 °C)  Pulse:  [69-87] 87  Resp:  [18] 18  SpO2:  [94 %-100 %] 97 %  BP: (115-181)/(70-93) 181/74     Weight: 95 kg (209 lb 7 oz)  Body mass index is 33.8 kg/m².  No intake or output data in the 24 hours ending 08/07/24 1500      Physical Exam  Vitals and nursing note  reviewed.   Constitutional:       General: She is not in acute distress.     Appearance: She is not toxic-appearing or diaphoretic.   HENT:      Head: Normocephalic and atraumatic.      Mouth/Throat:      Mouth: Mucous membranes are moist.   Eyes:      Pupils: Pupils are equal, round, and reactive to light.   Cardiovascular:      Rate and Rhythm: Normal rate and regular rhythm.      Pulses: Normal pulses.   Pulmonary:      Effort: Pulmonary effort is normal. No respiratory distress.      Breath sounds: No wheezing, rhonchi or rales.   Abdominal:      General: Bowel sounds are normal. There is no distension.      Palpations: Abdomen is soft.      Tenderness: There is no abdominal tenderness. There is no guarding.   Musculoskeletal:         General: Normal range of motion.      Cervical back: Normal range of motion.      Right lower leg: No edema.      Left lower leg: No edema.   Skin:     General: Skin is warm and dry.      Capillary Refill: Capillary refill takes less than 2 seconds.   Neurological:      General: No focal deficit present.      Mental Status: She is alert and oriented to person, place, and time.      Motor: Tremor present.   Psychiatric:         Attention and Perception: Attention normal.         Mood and Affect: Affect is flat.         Behavior: Behavior is withdrawn. Behavior is cooperative.             Significant Labs: All pertinent labs within the past 24 hours have been reviewed.    Significant Imaging: I have reviewed all pertinent imaging results/findings within the past 24 hours.    Assessment/Plan:      * Alcohol use disorder, severe, dependence  Alcohol withdrawal   -Patient reports drinking 1/2 fifth vodka, last drink on earlier today.  -ETOH level 155  -UDS +benzos,   -Nursing to perform CIWA assessment and treat for objective signs of DTs.    -Valium 10 mg [id number] for one day --> 10 mg [id number] for one day --> 10 mg Q12 hours for one day --> 5 mg Q12 hours for one day as tolerated. (A)     2.  Continue Trazadone 200 mg and Lexapro 20 mg as ascheduled (C)  -Initiate folic acid, thiamine, and MVI.    -Discussed the dangers of continued ETOH use with Pt and apparent systemic effects of her abuse.    -Will consult with case management for resources on cessation.    Dementia associated with alcoholism  Patient with dementia with likely etiology of alcohol-related dementia. Dementia is mild. The patient does not have signs of behavioral disturbance. Continue non-pharmacologic interventions to prevent delirium (No VS between 11PM-5AM, activity during day, opening blinds, providing glasses/hearing aids, and up in chair during daytime). Will avoid narcotics and benzos unless absolutely necessary. PRN anti-psychotics are not prescribed to avoid self harm behaviors.    Anemia, chronic disease  Anemia is likely due to chronic disease due to Chronic liver disease. Most recent hemoglobin and hematocrit are listed below.  Recent Labs     08/05/24  1229 08/05/24  1642 08/06/24  0306 08/07/24  0338   HGB 8.1*  --  8.9* 8.4*   HCT 29.8* 30* 32.8* 30.5*       Plan  - Monitor serial CBC: Daily  - Transfuse PRBC if patient becomes hemodynamically unstable, symptomatic or H/H drops below 7/21.  - Patient has not received any PRBC transfusions to date  - Patient's anemia is currently stable    Paroxysmal atrial fibrillation  Patient with Paroxysmal (<7 days) atrial fibrillation which is controlled currently with Beta Blocker. Patient is currently in sinus rhythm.[id number] Score: 3. Anticoagulation indicated. Anticoagulation done with eliquis .    Migraine  -History noted.  -Follows with neurology outpatient.    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 Bicarb 18, Anion gap 17, Glucose 69, EtOH 155, pH 7.23  -Received thiamine 100mg IV, D10 bolus, D5 bolus.  -Continue IVF,  MVI, thiamine, and folic acid daily  -Replete electrolytes PRN.  -Continue IVF.  -Daily CMP.    Type 2 diabetes mellitus without complication, without long-term current use of insulin  Patient's FSGs are uncontrolled due to hypoglycemia likely due to poor oral intake on current medication regimen.  Last A1c reviewed-   Lab Results   Component Value Date    HGBA1C 5.9 (H) 06/09/2024     Most recent fingerstick glucose reviewed-   Recent Labs   Lab 08/07/24  0737 08/07/24  1119   POCTGLUCOSE 188* 217*       Current correctional scale  stable  Maintain anti-hyperglycemic dose as follows-   Antihyperglycemics (From admission, onward)      Start     Stop Route Frequency Ordered    08/07/24 1330  insulin detemir U-100 (Levemir) pen 14 Units         -- SubQ Daily 08/07/24 1221    08/05/24 2226  insulin aspart U-100 pen 0-5 Units         -- SubQ Before meals & nightly PRN 08/05/24 2126          Hold Oral hypoglycemics while patient is in the hospital.  -Accuchecks AC/HS  -Diabetic/cardiac diet.    Hypothyroidism  Patient has chronic hypothyroidism. TFTs reviewed-   Lab Results   Component Value Date    TSH 1.637 06/07/2024   Will continue chronic levothyroxine and adjust for and clinical changes.    COPD (chronic obstructive pulmonary disease)  Patient's COPD is controlled currently.  Patient is currently off COPD Pathway. Continue scheduled inhalers Supplemental oxygen and monitor respiratory status closely.   -PRN duo nebs    VINICIO (obstructive sleep apnea)  -Chronic issue.  -Continue CPAP qhs.    Thrombocytopenia  The likely etiology of thrombocytopenia is liver disease. The patients 3 most recent labs are listed below.  Recent Labs     08/05/24  1229 08/06/24  0306 08/07/24  0338   PLT 70* 56* 60*       Plan  - Will transfuse if platelet count is <50k (if undergoing surgical procedure or have active bleeding).    Hyperlipidemia   Patient is not chronically on statin. Monitor clinically. Last LDL was   Lab Results    Component Value Date    LDLCALC 107.8 04/06/2023        Depression with anxiety  Patient has recurrent depression which is unknown and is currently controlled. Will Continue anti-depressant medications. We will consult psychiatry at this time. Patient does not display psychosis at this time. Continue to monitor closely and adjust plan of care as needed.  -Continue lexapro.    Hypotension due to hypovolemia  -Initially presented with significant hypotension.   -Resolved with IVF.  -Infectious work up unrevealing including CXR and UA.  -Blood cxs in process.  -LA 2.98>1.91>1.7    Hypomagnesemia  Patient has Abnormal Magnesium: hypomagnesemia. Will continue to monitor electrolytes closely. Will replace the affected electrolytes and repeat labs to be done after interventions completed. The patient's magnesium results have been reviewed and are listed below.  Recent Labs   Lab 08/07/24  0338   MG 1.7          Hypophosphatemia  Patient has Abnormal Phosphorus: hypophosphatemia. Will continue to monitor electrolytes closely. Will replace the affected electrolytes and repeat labs to be done after interventions completed. The patient's phosphorus results have been reviewed and are listed below.  Recent Labs   Lab 08/07/24  0338   PHOS 2.0*          DEVANTE (acute kidney injury)  DEVANTE is likely due to pre-renal azotemia due to dehydration. Baseline creatinine is  0.8-0.9 . Most recent creatinine and eGFR are listed below.  Recent Labs     08/06/24  0001 08/06/24  0306 08/07/24  0338   CREATININE 1.5* 1.4 1.0   EGFRNORACEVR 38.2* 41.5* >60.0        Plan  - DEVANTE is stable  - Avoid nephrotoxins and renally dose meds for GFR listed above  - Monitor urine output, serial BMP, and adjust therapy as needed    Sarcoidosis  - Patient with documented history of sarcoidosis.  Not currently on treatment.     Cigarette nicotine dependence without complication  Dangers of cigarette smoking were reviewed with patient in detail. Patient was  Counseled for 3-10 minutes. Nicotine replacement options were discussed. Nicotine replacement was discussed- not prescribed per patient's request    Fibromyalgia  -Chronic issue.  -Continue gabapentin.    Essential hypertension  Chronic, stable. Latest blood pressure and vitals reviewed-     Temp:  [97.6 °F (36.4 °C)-98.4 °F (36.9 °C)]   Pulse:  [69-87]   Resp:  [18]   BP: (115-181)/(70-93)   SpO2:  [94 %-100 %] .   Home meds for hypertension were reviewed and noted below.   Hypertension Medications               losartan (COZAAR) 25 MG tablet Take 25 mg by mouth once daily.    metoprolol succinate (TOPROL-XL) 50 MG 24 hr tablet Take 1 tablet (50 mg total) by mouth once daily.     While in the hospital, will manage blood pressure as follows; Adjust home antihypertensive regimen as follows- Hold losartan in the setting of DEVANTE and hypotension. Currently normotensive, so will resume metoprolol.    Will utilize p.r.n. blood pressure medication only if patient's blood pressure greater than 180/110 and she develops symptoms such as worsening chest pain or shortness of breath.      VTE Risk Mitigation (From admission, onward)           Ordered     apixaban tablet 5 mg  2 times daily         08/06/24 0232     IP VTE HIGH RISK PATIENT  Once         08/05/24 2126     Place sequential compression device  Until discontinued         08/05/24 2126                    Discharge Planning   BECCA: 8/8/2024     Code Status: Full Code   Is the patient medically ready for discharge?:     Reason for patient still in hospital (select all that apply): Patient trending condition, Laboratory test, and Treatment  Discharge Plan A: Home with family                  Aneta Soto PA-C  Department of Hospital Medicine   [facility] ([place])

## 2024-08-07 NOTE — PLAN OF CARE
Problem: Diabetes Comorbidity  Goal: Blood Glucose Level Within Targeted Range  Outcome: Progressing     Problem: Acute Kidney Injury/Impairment  Goal: Fluid and Electrolyte Balance  Outcome: Progressing   Prn insulin given. Prn ativan given due to /74 and tremors. Wheezing noted, nurse asked MD if other breathing treatment can be prescribed due to the pt stating that the albuterol causes more tremors. CIWA score 4. Bed locked, alarm set and in lowest position. Call light in reach.

## 2024-08-07 NOTE — ASSESSMENT & PLAN NOTE
Patient with dementia with likely etiology of alcohol-related dementia. Dementia is mild. The patient does not have signs of behavioral disturbance. Continue non-pharmacologic interventions to prevent delirium (No VS between 11PM-5AM, activity during day, opening blinds, providing glasses/hearing aids, and up in chair during daytime). Will avoid narcotics and benzos unless absolutely necessary. PRN anti-psychotics are not prescribed to avoid self harm behaviors.

## 2024-08-07 NOTE — SUBJECTIVE & OBJECTIVE
Interval History: NAEON. Pt seen and evaluated at bedside this am. Pt feels improved from yesterday but continues to have depressed mood and tremors. Will continue IP valium taper. Hypertensive today, monitoring closely and will utilize prn for SBP>180, otherwise HDS and afebrile.     Review of Systems   Constitutional:  Negative for appetite change, chills, diaphoresis, fatigue and fever.   HENT:  Negative for congestion and rhinorrhea.    Eyes:  Negative for photophobia and visual disturbance.   Respiratory:  Negative for cough, shortness of breath and wheezing.    Cardiovascular:  Negative for chest pain, palpitations and leg swelling.   Gastrointestinal:  Positive for nausea. Negative for abdominal distention, abdominal pain, diarrhea and vomiting.   Genitourinary:  Negative for dysuria, frequency and hematuria.   Musculoskeletal:  Negative for back pain, myalgias and neck pain.   Skin:  Negative for color change, pallor, rash and wound.   Neurological:  Positive for tremors and weakness. Negative for syncope, light-headedness and headaches.   Psychiatric/Behavioral:  Negative for confusion and hallucinations. The patient is not nervous/anxious.      Objective:     Vital Signs (Most Recent):  Temp: 98.4 °F (36.9 °C) (08/07/24 1112)  Pulse: 87 (08/07/24 1435)  Resp: 18 (08/07/24 1112)  BP: (!) 181/74 (08/07/24 1112)  SpO2: 97 % (08/07/24 1236) Vital Signs (24h Range):  Temp:  [97.6 °F (36.4 °C)-98.4 °F (36.9 °C)] 98.4 °F (36.9 °C)  Pulse:  [69-87] 87  Resp:  [18] 18  SpO2:  [94 %-100 %] 97 %  BP: (115-181)/(70-93) 181/74     Weight: 95 kg (209 lb 7 oz)  Body mass index is 33.8 kg/m².  No intake or output data in the 24 hours ending 08/07/24 1500      Physical Exam  Vitals and nursing note reviewed.   Constitutional:       General: She is not in acute distress.     Appearance: She is not toxic-appearing or diaphoretic.   HENT:      Head: Normocephalic and atraumatic.      Mouth/Throat:      Mouth: Mucous membranes  are moist.   Eyes:      Pupils: Pupils are equal, round, and reactive to light.   Cardiovascular:      Rate and Rhythm: Normal rate and regular rhythm.      Pulses: Normal pulses.   Pulmonary:      Effort: Pulmonary effort is normal. No respiratory distress.      Breath sounds: No wheezing, rhonchi or rales.   Abdominal:      General: Bowel sounds are normal. There is no distension.      Palpations: Abdomen is soft.      Tenderness: There is no abdominal tenderness. There is no guarding.   Musculoskeletal:         General: Normal range of motion.      Cervical back: Normal range of motion.      Right lower leg: No edema.      Left lower leg: No edema.   Skin:     General: Skin is warm and dry.      Capillary Refill: Capillary refill takes less than 2 seconds.   Neurological:      General: No focal deficit present.      Mental Status: She is alert and oriented to person, place, and time.      Motor: Tremor present.   Psychiatric:         Attention and Perception: Attention normal.         Mood and Affect: Affect is flat.         Behavior: Behavior is withdrawn. Behavior is cooperative.             Significant Labs: All pertinent labs within the past 24 hours have been reviewed.    Significant Imaging: I have reviewed all pertinent imaging results/findings within the past 24 hours.

## 2024-08-07 NOTE — ASSESSMENT & PLAN NOTE
The likely etiology of thrombocytopenia is liver disease. The patients 3 most recent labs are listed below.  Recent Labs     08/05/24  1229 08/06/24  0306 08/07/24  0338   PLT 70* 56* 60*       Plan  - Will transfuse if platelet count is <50k (if undergoing surgical procedure or have active bleeding).

## 2024-08-08 VITALS
WEIGHT: 208.69 LBS | HEART RATE: 81 BPM | SYSTOLIC BLOOD PRESSURE: 132 MMHG | HEIGHT: 66 IN | RESPIRATION RATE: 18 BRPM | DIASTOLIC BLOOD PRESSURE: 67 MMHG | TEMPERATURE: 98 F | OXYGEN SATURATION: 95 % | BODY MASS INDEX: 33.54 KG/M2

## 2024-08-08 LAB
ALBUMIN SERPL BCP-MCNC: 3.2 G/DL (ref 3.5–5.2)
ALP SERPL-CCNC: 45 U/L (ref 55–135)
ALT SERPL W/O P-5'-P-CCNC: 28 U/L (ref 10–44)
ANION GAP SERPL CALC-SCNC: 8 MMOL/L (ref 8–16)
ANISOCYTOSIS BLD QL SMEAR: SLIGHT
AST SERPL-CCNC: 34 U/L (ref 10–40)
BASO STIPL BLD QL SMEAR: ABNORMAL
BASOPHILS # BLD AUTO: 0.06 K/UL (ref 0–0.2)
BASOPHILS NFR BLD: 1.7 % (ref 0–1.9)
BILIRUB SERPL-MCNC: 0.3 MG/DL (ref 0.1–1)
BUN SERPL-MCNC: 5 MG/DL (ref 8–23)
CALCIUM SERPL-MCNC: 8.1 MG/DL (ref 8.7–10.5)
CHLORIDE SERPL-SCNC: 104 MMOL/L (ref 95–110)
CO2 SERPL-SCNC: 26 MMOL/L (ref 23–29)
CREAT SERPL-MCNC: 0.8 MG/DL (ref 0.5–1.4)
DACRYOCYTES BLD QL SMEAR: ABNORMAL
DIFFERENTIAL METHOD BLD: ABNORMAL
EOSINOPHIL # BLD AUTO: 0 K/UL (ref 0–0.5)
EOSINOPHIL NFR BLD: 1.1 % (ref 0–8)
ERYTHROCYTE [DISTWIDTH] IN BLOOD BY AUTOMATED COUNT: 18.6 % (ref 11.5–14.5)
EST. GFR  (NO RACE VARIABLE): >60 ML/MIN/1.73 M^2
GLUCOSE SERPL-MCNC: 223 MG/DL (ref 70–110)
HCT VFR BLD AUTO: 29.9 % (ref 37–48.5)
HGB BLD-MCNC: 8.9 G/DL (ref 12–16)
IMM GRANULOCYTES # BLD AUTO: 0.07 K/UL (ref 0–0.04)
IMM GRANULOCYTES NFR BLD AUTO: 2 % (ref 0–0.5)
LYMPHOCYTES # BLD AUTO: 1.5 K/UL (ref 1–4.8)
LYMPHOCYTES NFR BLD: 44.1 % (ref 18–48)
MAGNESIUM SERPL-MCNC: 1.4 MG/DL (ref 1.6–2.6)
MCH RBC QN AUTO: 26.2 PG (ref 27–31)
MCHC RBC AUTO-ENTMCNC: 29.8 G/DL (ref 32–36)
MCV RBC AUTO: 88 FL (ref 82–98)
MONOCYTES # BLD AUTO: 0.4 K/UL (ref 0.3–1)
MONOCYTES NFR BLD: 11.2 % (ref 4–15)
NEUTROPHILS # BLD AUTO: 1.4 K/UL (ref 1.8–7.7)
NEUTROPHILS NFR BLD: 39.9 % (ref 38–73)
NRBC BLD-RTO: 0 /100 WBC
OVALOCYTES BLD QL SMEAR: ABNORMAL
PHOSPHATE SERPL-MCNC: 3.1 MG/DL (ref 2.7–4.5)
PLATELET # BLD AUTO: 56 K/UL (ref 150–450)
PMV BLD AUTO: 11.7 FL (ref 9.2–12.9)
POCT GLUCOSE: 210 MG/DL (ref 70–110)
POCT GLUCOSE: 212 MG/DL (ref 70–110)
POIKILOCYTOSIS BLD QL SMEAR: SLIGHT
POLYCHROMASIA BLD QL SMEAR: ABNORMAL
POTASSIUM SERPL-SCNC: 3.9 MMOL/L (ref 3.5–5.1)
PROT SERPL-MCNC: 5.3 G/DL (ref 6–8.4)
RBC # BLD AUTO: 3.4 M/UL (ref 4–5.4)
SODIUM SERPL-SCNC: 138 MMOL/L (ref 136–145)
WBC # BLD AUTO: 3.49 K/UL (ref 3.9–12.7)

## 2024-08-08 PROCEDURE — 94799 UNLISTED PULMONARY SVC/PX: CPT

## 2024-08-08 PROCEDURE — 94640 AIRWAY INHALATION TREATMENT: CPT

## 2024-08-08 PROCEDURE — 84100 ASSAY OF PHOSPHORUS: CPT | Performed by: NURSE PRACTITIONER

## 2024-08-08 PROCEDURE — 27000221 HC OXYGEN, UP TO 24 HOURS

## 2024-08-08 PROCEDURE — 94660 CPAP INITIATION&MGMT: CPT

## 2024-08-08 PROCEDURE — 25000242 PHARM REV CODE 250 ALT 637 W/ HCPCS: Performed by: PHYSICIAN ASSISTANT

## 2024-08-08 PROCEDURE — 80053 COMPREHEN METABOLIC PANEL: CPT | Performed by: NURSE PRACTITIONER

## 2024-08-08 PROCEDURE — 25000003 PHARM REV CODE 250: Performed by: NURSE PRACTITIONER

## 2024-08-08 PROCEDURE — 99900035 HC TECH TIME PER 15 MIN (STAT)

## 2024-08-08 PROCEDURE — 94761 N-INVAS EAR/PLS OXIMETRY MLT: CPT

## 2024-08-08 PROCEDURE — 85025 COMPLETE CBC W/AUTO DIFF WBC: CPT | Performed by: NURSE PRACTITIONER

## 2024-08-08 PROCEDURE — 36415 COLL VENOUS BLD VENIPUNCTURE: CPT | Performed by: NURSE PRACTITIONER

## 2024-08-08 PROCEDURE — 99232 SBSQ HOSP IP/OBS MODERATE 35: CPT | Mod: ,,, | Performed by: PSYCHIATRY & NEUROLOGY

## 2024-08-08 PROCEDURE — 83735 ASSAY OF MAGNESIUM: CPT | Performed by: NURSE PRACTITIONER

## 2024-08-08 PROCEDURE — 63600175 PHARM REV CODE 636 W HCPCS: Performed by: NURSE PRACTITIONER

## 2024-08-08 PROCEDURE — 25000003 PHARM REV CODE 250: Performed by: PHYSICIAN ASSISTANT

## 2024-08-08 RX ORDER — DIAZEPAM 10 MG/1
10 TABLET ORAL EVERY 8 HOURS
Qty: 3 TABLET | Refills: 0 | Status: SHIPPED | OUTPATIENT
Start: 2024-08-09 | End: 2024-08-08

## 2024-08-08 RX ORDER — LORAZEPAM/0.9% SODIUM CHLORIDE 100MG/0.1L
2 PLASTIC BAG, INJECTION (ML) INTRAVENOUS
Status: ACTIVE | OUTPATIENT
Start: 2024-08-08 | End: 2024-08-08

## 2024-08-08 RX ORDER — DIAZEPAM 10 MG/1
10 TABLET ORAL EVERY 12 HOURS
Qty: 2 TABLET | Refills: 0 | Status: SHIPPED | OUTPATIENT
Start: 2024-08-10 | End: 2024-08-08

## 2024-08-08 RX ORDER — LANOLIN ALCOHOL/MO/W.PET/CERES
400 CREAM (GRAM) TOPICAL DAILY
Qty: 7 TABLET | Refills: 0 | Status: SHIPPED | OUTPATIENT
Start: 2024-08-08 | End: 2024-08-15

## 2024-08-08 RX ORDER — DIAZEPAM 5 MG/1
TABLET ORAL
Qty: 24 TABLET | Refills: 0 | Status: SHIPPED | OUTPATIENT
Start: 2024-08-08 | End: 2024-08-14

## 2024-08-08 RX ORDER — LORAZEPAM/0.9% SODIUM CHLORIDE 100MG/0.1L
2 PLASTIC BAG, INJECTION (ML) INTRAVENOUS
Status: DISCONTINUED | OUTPATIENT
Start: 2024-08-08 | End: 2024-08-08

## 2024-08-08 RX ORDER — DIAZEPAM 5 MG/1
10 TABLET ORAL EVERY 6 HOURS
Status: DISCONTINUED | OUTPATIENT
Start: 2024-08-08 | End: 2024-08-08 | Stop reason: HOSPADM

## 2024-08-08 RX ORDER — DIAZEPAM 5 MG/1
5 TABLET ORAL EVERY 12 HOURS PRN
Qty: 6 TABLET | Refills: 0 | Status: SHIPPED | OUTPATIENT
Start: 2024-08-11 | End: 2024-08-08

## 2024-08-08 RX ADMIN — LEVALBUTEROL HYDROCHLORIDE 0.31 MG: 0.63 SOLUTION RESPIRATORY (INHALATION) at 09:08

## 2024-08-08 RX ADMIN — INSULIN ASPART 2 UNITS: 100 INJECTION, SOLUTION INTRAVENOUS; SUBCUTANEOUS at 08:08

## 2024-08-08 RX ADMIN — DIAZEPAM 10 MG: 5 TABLET ORAL at 06:08

## 2024-08-08 RX ADMIN — DIAZEPAM 10 MG: 5 TABLET ORAL at 12:08

## 2024-08-08 RX ADMIN — INSULIN DETEMIR 14 UNITS: 100 INJECTION, SOLUTION SUBCUTANEOUS at 08:08

## 2024-08-08 RX ADMIN — PANTOPRAZOLE SODIUM 40 MG: 40 TABLET, DELAYED RELEASE ORAL at 08:08

## 2024-08-08 RX ADMIN — FOLIC ACID 1 MG: 1 TABLET ORAL at 08:08

## 2024-08-08 RX ADMIN — ESCITALOPRAM OXALATE 20 MG: 5 TABLET, FILM COATED ORAL at 08:08

## 2024-08-08 RX ADMIN — LEVALBUTEROL HYDROCHLORIDE 0.31 MG: 0.63 SOLUTION RESPIRATORY (INHALATION) at 12:08

## 2024-08-08 RX ADMIN — METOPROLOL SUCCINATE 50 MG: 50 TABLET, EXTENDED RELEASE ORAL at 08:08

## 2024-08-08 RX ADMIN — THERA TABS 1 TABLET: TAB at 08:08

## 2024-08-08 RX ADMIN — APIXABAN 5 MG: 5 TABLET, FILM COATED ORAL at 08:08

## 2024-08-08 RX ADMIN — Medication 100 MG: at 08:08

## 2024-08-08 RX ADMIN — INSULIN ASPART 2 UNITS: 100 INJECTION, SOLUTION INTRAVENOUS; SUBCUTANEOUS at 12:08

## 2024-08-08 RX ADMIN — GABAPENTIN 300 MG: 300 CAPSULE ORAL at 08:08

## 2024-08-08 RX ADMIN — DIAZEPAM 10 MG: 5 TABLET ORAL at 09:08

## 2024-08-08 RX ADMIN — LEVOTHYROXINE SODIUM 75 MCG: 25 TABLET ORAL at 06:08

## 2024-08-08 NOTE — PLAN OF CARE
CM spoke with pt in room.  Pt agrees to receive alcohol cessation resources; CM provided alcohol cessation resources.    Per MD pt to be d/c'd today.  Pt states her  will give her a ride home.  She  states she is anxious to be d/c'd.  CM notified MD of pt's readiness to leave.    DAYTON PryorN, BS, RN, [facility]

## 2024-08-08 NOTE — PLAN OF CARE
Problem: Adult Inpatient Plan of Care  Goal: Plan of Care Review  Outcome: Met  Goal: Patient-Specific Goal (Individualized)  Outcome: Met  Goal: Absence of Hospital-Acquired Illness or Injury  Outcome: Met  Goal: Optimal Comfort and Wellbeing  Outcome: Met  Goal: Readiness for Transition of Care  Outcome: Met     Problem: Diabetes Comorbidity  Goal: Blood Glucose Level Within Targeted Range  Outcome: Met     Problem: Acute Kidney Injury/Impairment  Goal: Fluid and Electrolyte Balance  Outcome: Met  Goal: Improved Oral Intake  Outcome: Met  Goal: Effective Renal Function  Outcome: Met     Problem: Skin Injury Risk Increased  Goal: Skin Health and Integrity  Outcome: Met     Problem: Sepsis/Septic Shock  Goal: Optimal Coping  Outcome: Met  Goal: Absence of Bleeding  Outcome: Met  Goal: Blood Glucose Level Within Targeted Range  Outcome: Met  Goal: Absence of Infection Signs and Symptoms  Outcome: Met  Goal: Optimal Nutrition Intake  Outcome: Met     Problem: Fall Injury Risk  Goal: Absence of Fall and Fall-Related Injury  Outcome: Met     Problem: Alcohol Withdrawal  Goal: Alcohol Withdrawal Symptom Control  Outcome: Met  Goal: Optimal Neurologic Function  Outcome: Met  Goal: Readiness for Change Identified  Outcome: Met      Patient being discharged per MD orders.  VSS , afebrile and no c/o pain at this time.  Reviewed discharge instructions with patient, VU.   to come to bedside to provide a change of clothes and  meds from pharmacy.  W/c transport order placed.  Personal belonging packed and with patient at bedside.

## 2024-08-08 NOTE — PROGRESS NOTES
[facility]   DEPARTMENT OF PSYCHIATRY     IDENTIFIERS & DEMOGRAPHICS:     ENCOUNTER: subsequent    -- PATIENT IDENTIFIERS: Earl Abdul  [id number]  1960  64 y.o.  female  -- LOCATION OF PATIENT: unit (med/surg)    -- MODE OF ARRIVAL: ambulance  -- PRESENT WITH PATIENT DURING SESSION: ALONE  -- SOURCES OF INFORMATION: PATIENT, staff, EHR/chart  -- ENCOUNTER PROVIDER: Liborio Patel MD        PRESENTATION:     CHIEF COMPLAINT(S): Problematic alocholic use disorder    Per Chart:    63 y/o F w/ comorbid depression and anxiety (on lexapro), EtOH use disorder complicated by prior withdraws and seizures in the past, history of CLL, breast ca, (COPD/T2DM) presented to the ED for light-headiness, dizziness, and stumbling around the house after drinking 1/2 bottle of EtOH yesterday. When she took her blood pressure she was hypotensive, so she called EMS. While in the ED, she found to be altered, hypotensive, hypoglycemic and have level , uTox positive for EtOH, zoya, amphetamine. She was given IV fluids, and valium and was admitted for hypovolemia and EtOH withdrawals. Addiction psych was consulted because patient wanted to psych b/c interested in detoxing and having a sober .     Per Patient:    On initial interview 8/6: Her typical day consist of waking up and take a drink of vodka and smoke in a specific chair in the house. She would go have breakfast and go on with her day. However, her day revolves around drinking and smoking cigarette around the chair. She does not work. Her EtOH history came ever since the passing of her abrupt passing of her oldest son. She has history of EtOH withdrawals complicated by seizures in the past. Reports having multiple attempts of EtOH cessation detox/programs and was previously on naltrexone (in June) and disulfiram in the past. She is not interested in going to rehab, but wishes to have an alcohol  that sits with her daily. She is on  "lexapro daily for depression/anxiety; however, report no improvement in her mood.     8/7 On interview this morning, patient reports doing "better", mood is "fair". She reports good sleep overnight. She notes she still feels a little unsteady on her feet and requires assistance to use the restroom but nothing out of the ordinary. When inquired about her shoulder tremor, she endorses a "detox shakes" which quickly subsides on interview. She states that her  is "upset" with her because she relapsed. We discussed substance use assistance options at discharge. She recalls being counseled on an inpatient rehabilitation program prior to enrolling in an IOP, but patient adamantly refuses. She does not want to be away from home and be "bored", she also notes that she just left a program and does not want to do it again. She is interested in an IOP however, but wants to be home. She tends to spend her days alone while her  is at work. We discussed her degree of motivation for such a program and she claims she can do it. She reports wanting to stop drinking but claims her dementia is getting in the way.       COLLATERAL  Per Qualified Provider(s)/Professional(s):    Per YAMILETH 8/7: CM spoke with Henrique Abdul, spouse, [phone].  Pt lives at home with , [facility] [place]  Family will drive her home, and  takes her to MD appts.  Pt has had [facility] stay with her while  is gone; pt has weakness and  states he feels better with her having some supervision while he's gone.  No DME, coumadin or HD.  Pt is physically independent with bathing, dressing; CG states that when pt is drinking, she doesn't bathe.  CG c/o pt with alcoholism, declines resources, states they already have sufficient resources.  CG c/o pt with social isolation when she drinks.  CG declines resources, states pt would not use them.      CURRENT " "EPISODE:  -- EPISODE STATUS: recurrent    -- PRECIPITANTS: unclear, patient recently discharged from inpatient rehab  -- EXACERBATING FACTORS: relationship stressors with , complicated grief of son's death     Interval Hx  overview & progression:     Upon coming into the room, patient reported that she is feeling good and wants to go home. She denies any symptoms at this time. No tremors. When asked about what her plans after this hospitalization, she states that she will call to enroll in a sober  program. She had visiting angels who comes sit with her in the past but she does not want that anymore. She states that she needs to set up with a  program once she leaves. She does not know the name of it at this time. We suggested the idea of going to sober living; however, she adamantly refuses to go to it because she is "tired of it, and I am away from home." Currently denies cravings and reports that her mood has improved from yesterday. She is requesting a psych referral.     REVIEW OF SYSTEMS:    >> SOURCES: patient, chart     Y   Sleep Disturbance/Disruption  +insomnia     Y   Appetite/Weight Change  +decreased appetite     Y   Alterations in Energy Level  +fatigue/anergia     N   Impaired Focus/Concentration   Y   Depressive Symptomatology  +depressed mood, +anhedonia, +amotivation, +hopelessness     Y   Excessive Anxiety/Worry  +generalized anxiety/worry    no panic attacks   N   Dysregulated Mood/Behavior   N   Manic Symptomatology   N   Psychosis   N   Trauma-Related   N   Impulsivity/Compulsivity/Obsessionality   U   Disordered Eating   U   Dissociation   N   Pain   U   Cardiopulmonary Symptoms   Y   Abnormal Involuntary Movements  +tremor    upper extremity resting and intention tremor at baseline    Regarding the current presentation, no other significant issues or complaints are voiced or known at this " "time.       ADD-ON PSYCHOTHERAPY:     N/A         HISTORY:     >> SOURCES: patient, chart review      -- Pertinent Elements of the Past History:     Patient is followed by an outpatient neurologist for memory loss, dementia, non specific pattern on PET, FTD vs Alzheimer's and Chronic Migraines. She is prescribed Qulipta 60mg PO daily and  Aricept 5mg PO nightly.      -- Psychotropic Trials  detailed/expanded:     Cymbalta, lexapro, trazodone, seroquel, zyprexa, naltrexone    >> SCHEDULED AND PRN MEDS: reviewed/reconciled  see MEDCARD      Allergies:  Lortab [hydrocodone-acetaminophen], Promethazine, and Albuterol     EXAMINATION:     VITALS:  BP (!) 156/81 (BP Location: Left arm, Patient Position: Lying)   Pulse 80   Temp 98.4 °F (36.9 °C) (Oral)   Resp 18   Ht 5' 6" (1.676 m)   Wt 94.7 kg (208 lb 10.7 oz)   SpO2 97%   Breastfeeding No   BMI 33.68 kg/m²     MENTAL STATUS EXAMINATION:  Appearance: appears stated age, overweight  in no apparent distress, well-appearing    in hospital garb  Behavior & Attitude: participative, under adequate behavioral control, able to redirect  calm, cooperative    calm, cooperative, appropriate eye contact  Movements & Motor Activity:   resting tremor observed in bilateral upper extremities, gait deferred  Speech & Language: normal rate, normal volume, normal quantity, normal latency, spontaneous, reciprocal, fluent    Mood: "good"  Affect: reactive  mood congruent    mood congruent,  Thought Process & Associations: linear, goal-directed, organized, logical    Thought Content & Perceptions: no responding to internal stimuli    denies SI, HI, paranoia, or AVH  Orientation: grossly intact, oriented to person, oriented to place, oriented to time, oriented to situation    oriented to person, place "[facility]", year "2024", month "August", date "8th"  Recent & Remote Memory: intact (recent)    Attention & Concentration: intact  attentive to conversation, not " easily distracted    Fund of Knowledge: intact    Insight: fair    Judgment: adequate            RISK & REGULATORY:      RISK PARAMETERS (current to the encounter/episode  NOT inclusive of past history):     N   Suicidal Ideation/Threats   N   Suicide Attempts/Gestures   N   Homicidal Ideation/Threats   N   Homicidal Behavior   N   Non-Suicidal Self-Injurious Behavior   N   Perpetrated Violence     FIREARMS & WEAPONS:     N   Ready Access to Firearms     SAFETY SCREENINGS:    -- SOCIAL DETERMINANTS OF HEALTH (SDOH): NOT LIMITING Tx/MGMT  -- SERIOUS MENTAL ILLNESS (SMI): PRESENT    -- ONGOING ABUSE: NO - ABSENT    -- PROTECTIVE FACTORS: IDENTIFIED       - SPECIFIC FACTORS IDENTIFIED: accessible support, agrees to monitoring, social supports    -- RISK FACTORS: IDENTIFIED     - SPECIFIC MODIFIABLE FACTORS IDENTIFIED: guilt/worthlessness, interpersonal conflict, substance abuse, social isolation, psychiatric sx     - SPECIFIC NON-MODIFIABLE FACTORS IDENTIFIED: hx psych tx, age 57+ years    -- CONTRACTS FOR SAFETY: unknown  unable to assess  -- FUTURE ORIENTED: YES  -- REMORSE/REGRET: YES    -- CAREGIVER(S)     - SUPPORTIVE & APPROPRIATELY INVOLVED: YES     - MONITORING: AVAILABLE/AGREEABLE     - ADVOCATES FOR COMMUNITY MGMT (safe/appropriate): unknown/unable to assess    -- RISK MITIGATION & PREVENTION:      - INTERVENTIONS: advice/counseling, tx of pathology, [id number]/911/ED (info)     REGULATORY:    -- CARE COORDINATION  the case was discussed and care was coordinated with member(s) of the treatment team.  -- : REVIEWED      INFORMED CONSENT & SHARED DECISION MAKING are the hallmark and bedrock of good clinical care, and as such have been employed and obtained, respectively, to the degree possible.  Discussed, to the extent possible, diagnosis, risks and benefits of proposed treatment (e.g., medication, therapy) vs alternative treatments vs no treatment, potential side effects of  these treatments, and the inherent unpredictability of treatment.  Resources have been provided via the patient instructions in the AVS to supplement, augment, and reinforce discussions, counseling, and/or interventions.       - ABILITY TO UNDERSTAND, PARTICIPATE & ENGAGE: adequate     - AGREEABLE TO TREATMENT (consent/assent): the patient consents to treatment     - RELIABILITY/ACCURACY: the patient is deemed to be an inconsistent historian      WARNINGS & PRECAUTIONS:  >> In cases of emergencies (e.g. SI/HI resulting in danger to self or others, functioning deteriorating to the level of grave disability), call 911 or [phone], or present to the emergency department for immediate assistance.    >> Individuals should not operate a motor vehicle or heavy machinery if effects of medications or underlying symptoms/condition impair the ability to do so safely.    >> FULLY comply with ANY/ALL medication as prescribed/instructed and report ANY/ALL suspected adverse effects to appropriate health care providers.       ASSESSMENT & PLAN:     DIAGNOSES & PROBLEMS:       1.  Alcohol use disorder, severe  chronic with exacerbation/progression    2.  Unspecified Neurocognitive Disorder  chronic with exacerbation/progression    3.  Depression with anxiety  chronic stable    PSYCHOTROPIC REGIMEN:   (C)=Continue as prescribed  (A)=Adjust as noted  (I)=Iniitate  (D)=Discontinue      1.  Valium taper 10 mg [id number] for one day today --> 10 mg Q12 hours for one day --> 5 mg Q12 hours for one day as tolerated. (A) (C)    2.  Lexapro 20 mg PO daily (C)    3.  Trazodone 200 mg PO daily (C)    -- ASSESSMENT (synthesis  analysis):     65 y/o F with EtOH use disorder w/ withdrawals and seizures in the past, failed multiple attempts of alcoholic cessation/detox programs presenting here with signs and symptoms of alcohol use disorder and alcoholic withdrawals symptoms. Patient was started on a alcoholics withdrawal protocol with valium taper.       - GLOBAL FUNCTIONING: at or near baseline   - STRENGTHS: family support, access to care, access to housing, financial stability   - LIABILITIES: comborbid depression and anxiety, marital issues, limited insight vs motivation for appropriate length of treatment, social isolation, history of failed trials    -- LEGAL (current status  certification criteria):     - DANGER TO SELF: no   - DANGER TO OTHERS: no   - GRAVELY DISABLED: no    -- DISPOSITION  SUMMATION:  With reasonable medical certainty, based on history, chart review, available collateral information, and a present-state examination:    - does not currently meet the criteria for psychiatric hospitalization, as can be successfully managed in a less restrictive level of care or in the community   - PEC is NOT INDICATED - rescind if in place    -- PLAN (goals  recommentations):     With reasonable medical certainty, based on history, chart review, available collateral information, and a present-state examination:  - continue Valium taper as scheduled and current psychotropics, patient is tolerating well.  - Patient was counseled on benefit of enrolling in an inpatient rehab program prior to attempting IOP again, which she is declining at this time. She is amenable for an IOP program but her insight and ability to commit are questionable at this time considering her recent rehab admission and cognitive issues. Will continue to discuss options available.   - PEC is not indicated  - upon discharge, it is recommended to follow up with an outpatient provider within 1-2 weeks of discharge, but ideally as soon as possible     -- ADDICTION ###  - continue alcohol (or sedative-hypnotic) withdrawal protocol, including supportive measures,frequent monitoring of vitals and CIWA-Ar, and use of PRN +/- standing benzodiazepines (see herein for dosing guidance and/or recommendations)  - provide supportive care as warranted: e.g., fluid and electrolyte replacement,  vitamin repletion (thiamine, folic acid, multivitamin), adequate caloric support     -- RECOMMENDATIONS FOR DETOX TAPER: Valium 10 mg [id number] for one day --> 10 mg [id number] for one day --> 10 mg Q12 hours for one day --> 5 mg Q12 hours for one day as tolerated.   - options for medication-assisted treatment (MAT) for alcohol use disorder were discussed  - recommend patient enroll in addiction rehab program after discharge and medical stabilization  - counseled on full abstinence from alcohol and substances of abuse (illicit and prescription)  - full engagement in 12 step (or equivalent) recovery program(s), including meeting attendance and acquisition/maintenance of sponsor  - relapse prevention and motivational interviewing provided  - provided resources for various addiction rehabilitation options as part of aftercare planning    Patient is adamantly refusing residential sober living prior to enrolling to [facility]. She is focus on enrolling some home companion service, unknown name. She is requesting establishing care with outpatient psychiatrist preferably at [facility].      Thank you for the consult. Addiction psychiatry will sign off.         MEDICAL DECISION MAKING:    - RISK: MODERATE     - COMPLEXITY: MODERATE   - DATA: +review of prior external note(s) from each unique source, +assessment requiring an independent historian   > LEVEL: MODERATE    CHART REVIEW: available documentation has been reviewed, and pertinent elements of the chart have been incorporated into this evaluation where appropriate.       DIAGNOSTIC TESTING:      Glu 223 (H)  8/8/2024  Li *   *  TSH 1.637  6/7/2024    HgA1c 5.9 (H)  6/9/2024  VPA *   *   FT4 1.12  3/1/2024    Na 138  8/8/2024  CLZ *   *  WBC 3.49 (L)  8/8/2024    Cr 0.8  8/8/2024  ANC 1.4; 39.9 (L);   8/8/2024   Hgb 8.9 (L)  8/8/2024     BUN 5 (L)  8/8/2024  Trop I 0.013  8/5/2024  HCT 29.9 (L)  8/8/2024     GFR >60.0  8/8/2024   CPK 75  5/17/2024  PLT 56 (L)   8/8/2024     Alb 3.2 (L)  8/8/2024   PRL *   *  B12 372  5/17/2024     T Bili 0.3  8/8/2024  Chol 184  4/6/2023  B9 15.9  5/17/2024    ALP 45 (L)  8/8/2024  TGs 161 (H)  4/6/2023  B1 114  *    AST 34  8/8/2024  HDL 44  4/6/2023  Vit D *   *     ALT 28  8/8/2024  .8  4/6/2023  HIV Non-reactive  4/11/2024     INR 1.0  3/2/2024  Baylee *   *   Hep C Non-reactive  4/11/2024    GGT *   *  Lip 38  6/7/2024  RPR *   *    MCV 88  8/8/2024   NH4 46  8/5/2024  UPT *   *      PETH 301  11/13/2023  THC Negative  8/5/2024    ETOH 155 (H)  8/5/2024  DESIREE Negative  8/5/2024    EtG Negative  5/21/2024  AMP Presumptive Positive (A)  8/5/2024     (A)  8/5/2024  OPI Negative  8/5/2024    BZO Presumptive Positive (A)  8/5/2024  MTD Negative  8/5/2024     BAR Negative  8/5/2024  BUP *   *    PCP Negative  8/5/2024  FEN Not Detected  7/18/2023     Results for orders placed or performed during the hospital encounter of 08/05/24   EKG 12-lead    Collection Time: 08/05/24 11:49 AM   Result Value Ref Range    QRS Duration 90 ms    [facility] QTC Calculation 424 ms    Narrative    Test Reason : I95.9,    Vent. Rate : 065 BPM     Atrial Rate : 065 BPM     P-R Int : 166 ms          QRS Dur : 090 ms      QT Int : 408 ms       P-R-T Axes : 088 080 059 degrees     QTc Int : 424 ms    Normal sinus rhythm  Normal ECG  When compared with ECG of 07-JUN-2024 16:04,  No significant change was found  Confirmed by Sg STEPHENSON MD ([id number]) on 8/5/2024 1:40:11 PM    Referred By: System System           Confirmed By:Sg STEPHENSON MD        SEN & LINKS:        Y  = yes/endorses     N  = no/denies     U  = unknown/unable to assess    ADHD   AIMS   AUDIT   AUDIT-C   C-SSRS (Screen)   C-SSRS (Short)   C-SSRS (Full)   DAST   DAST-10   HEATHER-7   MMSE   MoCA   PCL-5   PHQ-9   ROSALINA   YMRS     Consults  [facility] MED/SURG FLEX 16WT     Shadi Haley MD  [facility] Psych[facility]ry PGY-II   Department of  Psychiatry  [facility]       STAFF NOTE    The chart was reviewed and the case was discussed, including the assessment and management plan.  I have personally interviewed the patient, and agree with the overall findings, with corrections or clarifications, if any, noted herein.    *Tolerating alcohol detox reasonably well with no s/sx complicated withdrawal.  Continue detox protocol.  Memory remains poor but this is chronic.  Recommendation would be to pursue a dual diagnosis residential rehab, and upon successful completion consider sober living while she engages in a dual diagnosis IOP, and then finally to transition to outpatient psychiatrist.*    Liborio Patle MD  Board Certification: Psychiatry and Addiction Medicine

## 2024-08-09 NOTE — PLAN OF CARE
[facility] - Med Surg ([facility])  Discharge Final Note    Primary Care Provider: Andrew Rodriguez MD    Expected Discharge Date: 8/8/2024    Final Discharge Note (most recent)       Final Note - 08/09/24 0811          Final Note    Assessment Type Final Discharge Note (P)      Anticipated Discharge Disposition Home or Self Care (P)         Post-Acute Status    Coverage [employer] (P)      Discharge Delays None known at this time (P)                      Important Message from Medicare  Pt d/c'd home with ride from .      DAYTON PryorN, BS, RN, CCM

## 2024-08-10 LAB
BACTERIA BLD CULT: NORMAL
BACTERIA BLD CULT: NORMAL

## 2024-08-16 ENCOUNTER — NURSE TRIAGE (OUTPATIENT)
Dept: ADMINISTRATIVE | Facility: CLINIC | Age: 64
End: 2024-08-16
Payer: COMMERCIAL

## 2024-08-16 NOTE — TELEPHONE ENCOUNTER
Jess calling on behalf of patient who is present. Reports patient is going through a detox and was experiencing shaky hands. This has resolved but patient is experiencing a decrease in appetite. Main symptom of dizziness/lightheadedness that began last night. Advised, per protocol and verbalizes understanding. There are no available appointments at this time but patient does not wise to be evaluated today. Informed I would route a message for follow up with Jess [phone].    Reason for Disposition   Lightheadedness (dizziness) present now, after 2 hours of rest and fluids    Additional Information   Negative: SEVERE difficulty breathing (e.g., struggling for each breath, speaks in single words)   Negative: Shock suspected (e.g., cold/pale/clammy skin, too weak to stand, low BP, rapid pulse)   Negative: Difficult to awaken or acting confused (e.g., disoriented, slurred speech)   Negative: Fainted, and still feels dizzy afterwards   Negative: Overdose (accidental or intentional) of medications   Negative: New neurologic deficit that is present now: * Weakness of the face, arm, or leg on one side of the body * Numbness of the face, arm, or leg on one side of the body * Loss of speech or garbled speech   Negative: Heart beating < 50 beats per minute OR > 140 beats per minute   Negative: Sounds like a life-threatening emergency to the triager   Negative: SEVERE dizziness (e.g., unable to stand, requires support to walk, feels like passing out now)   Negative: SEVERE headache or neck pain   Negative: Spinning or tilting sensation (vertigo) present now and one or more stroke risk factors (i.e., hypertension, diabetes mellitus, prior stroke/TIA, heart attack, age over 60) (Exception: Prior physician evaluation for this AND no different/worse than usual.)   Negative: Neurologic deficit that was brief (now gone), ANY of the following:* Weakness of the face, arm, or leg on one side of the body* Numbness of the face,  arm, or leg on one side of the body* Loss of speech or garbled speech   Negative: Difficulty breathing   Negative: Loss of vision or double vision  (Exception: Similar to previous migraines.)   Negative: Extra heartbeats, irregular heart beating, or heart is beating very fast (i.e., 'palpitations')   Negative: Drinking very little and dehydration suspected (e.g., no urine > 12 hours, very dry mouth, very lightheaded)   Negative: Follows bleeding (e.g., stomach, rectum, vagina)  (Exception: Became dizzy from sight of small amount blood.)   Negative: Patient sounds very sick or weak to the triager    Protocols used: Dizziness-A-OH

## 2024-08-17 NOTE — DISCHARGE SUMMARY
[facility] - Med Surg ([place] [address])  [facility] Medicine  Discharge Summary      Patient Name: Earl Abdul  MRN: [id number]  IZA: [id number]  Patient Class: IP- Inpatient  Admission Date: 8/5/2024  Hospital Length of Stay: 3 days  Discharge Date and Time: 8/8/2024  4:00 PM  Attending Physician: Shelley att. providers found   Discharging Provider: Dalila Mccormick MD  Primary Care Provider: Andrew Rodriguez MD  [facility] Medicine Team: [facility] MED D Dalila Mccormick MD  Primary Care Team: [facility] MED D    HPI:   Earl Abdul is a 64 y.o. female with a PMHx of ETOH abuse, anemia, CLL, DM2, COPD, depression, HTN, HLD, GERD, sarcoidosis, nicotine dependence, and fatty liver presenting due to hypotension and lightheadedness. She took her blood pressure and saw that it was low. States that she feels lightheaded worse with standing. She denies focal weakness, headache, or numbness/tingling. Pt reports she was drinking Vodka today at home, about 1/2 fifth. She reports she is interested in pursuing treatment for her alcohol abuse. Denies SOB, CP, fevers, abdominal pain, leg swelling, dizziness, changes in urination, changes in bowel movements. Reports developing nausea and vomiting after arriving to the ED.     In the ED, pt initially hypotensive which quickly improved with IVF, also noted to be tachypneic and was placed on bipap due to being mildly hypercapnic for <1 hr and was weaned to 2L via nasal cannula, afebrile. CBC with stable anemia and thrombocytopenia. Na 135. Bicarb 18. Anion gap 17. Cr 1.4 (bl 0.8-0.9). Glucose 69. POC glucose 95. Total protein 5.9. BNP 47. Troponin 0.013. POC lactate 2.98>1.91. Lactate 1.7. EtOH 155. Blood cxs in process. pH 7.23>7.35, PCO2 53.3>49. EKG shows SR, 65 bpm, no acute ischemic changes. UA non infectious. CXR negative for acute process. The patient received 1L LR bolus, D10 bolus, D5 bolus, ibuprofen, zofran, IV thiamine, and IV valium 5mg.     * No surgery found *      Hospital  "Course:   Pt placed in observation for hypotension altered mental status that is likely secondary to intoxicated state. Pt initially acidotic which was resolved w/ Bipap. CXR w/o acute process. Infectious w/u negative. Serum ETOH 155. UDS w/ presumptive amphetamines and benzos. Pt treated w/ IV dextrose, IV thiamine, and valium taper. Addiction psych consulted. She was started on IP Valium taper. She was seen by psychiatry/addiction medicine multiple times. Patient reported feeling good on taper. No symptoms and wants to go home. She denied having cravings. She was stable and ready for discharge on Valium taper as recommended by psych. She was given resources for alcohol cessation programs.    "RECOMMENDATIONS FOR DETOX TAPER: Valium 10 mg E0pasdx for one day --> 10 mg D1rhqyx for one day --> 10 mg Q12 hours for one day --> 5 mg Q12 hours for one day as tolerated. "     Goals of Care Treatment Preferences:  Code Status: Full Code    Health care agent: Spouse is KINA  Health care agent number: No value filed.                   SDOH Screening:  The patient was screened for utility difficulties, food insecurity, transport difficulties, housing insecurity, and interpersonal safety and there were no concerns identified this admission.     Consults:   Consults (From admission, onward)          Status Ordering Provider     Inpatient consult to Midline team  Once        Provider:  (Not yet assigned)    Completed WALTER BOWSER     Inpatient consult to Psychiatry  Once        Provider:  (Not yet assigned)    Completed WALTER BOWSER            No new Assessment & Plan notes have been filed under this hospital service since the last note was generated.  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 [D72.820, C91.10] 09/27/2022 Yes    Starvation ketoacidosis [T73.0XXA, E87.29] 04/29/2022 Yes    Type 2 diabetes mellitus without complication, without long-term current use of insulin [E11.9] 11/30/2021 Yes    Hypothyroidism [E03.9] 11/30/2021 Yes    COPD (chronic obstructive pulmonary disease) [J44.9] 04/17/2021 Yes    VINICIO (obstructive sleep apnea) [G47.33]  Yes    Thrombocytopenia [D69.6] 10/26/2019 Yes    Depression with anxiety [F41.8] 08/16/2017 Yes    Hypotension due to hypovolemia [E86.1] 09/29/2016 Yes    Hypomagnesemia [E83.42] 10/19/2014 Yes    Hypophosphatemia [E83.39] 10/18/2014 Yes    DEVANTE (acute kidney injury) [N17.9] 10/14/2014 Yes    Cigarette nicotine dependence without complication [F17.210] 02/07/2014 Yes    Essential hypertension [I10] 02/07/2014 Yes     Chronic    Alcohol withdrawal [F10.939] 02/07/2014 Yes    Fibromyalgia [M79.7] 02/07/2014 Yes    Hyperlipidemia [E78.5] 11/30/2012 Yes     Chronic    Sarcoidosis [D86.9] 03/31/2011 Yes      Problems Resolved During this Admission:       Discharged Condition: stable    Disposition: Home or Self Care    Follow Up:    Patient Instructions:      Ambulatory referral/consult to Psychiatry   Standing Status: Future   Referral Priority: Routine Referral Type: Psychiatric   Referral Reason: Specialty Services Required   Requested Specialty: Psychiatry   Number of Visits Requested: 1     Ambulatory referral/consult to Addiction Psychiatry   Standing Status: Future   Referral Priority: Urgent Referral Type: Psychiatric   Referral Reason: Specialty Services Required   Requested Specialty: Addiction Medicine   Number of Visits Requested: 1     Ambulatory referral/consult to Internal Medicine   Standing Status: Future   Referral Priority: Routine Referral Type:  Consultation   Referral Reason: Specialty Services Required   Requested Specialty: Internal Medicine   Number of Visits Requested: 1     Reason for not Ordering Smoking Cessation Referral     Order Specific Question Answer Comments   Reason for not ordering: Patient refused      Reason for not Prescribing Nicotine Replacement     Order Specific Question Answer Comments   Reason for not Prescribing: Patient refused        Significant Diagnostic Studies: N/A    Pending Diagnostic Studies:       None           Medications:  Reconciled Home Medications:      Medication List        CHANGE how you take these medications      diazePAM 5 MG tablet  Commonly known as: VALIUM  Take 2 tablets (10 mg total) by mouth every 6 (six) hours for 1 day, THEN 2 tablets (10 mg total) every 8 (eight) hours for 1 day, THEN 2 tablets (10 mg total) every 12 (twelve) hours for 1 day, THEN 1 tablet (5 mg total) every 12 (twelve) hours for 3 days.  Start taking on: August 8, 2024  What changed:   medication strength  See the new instructions.     gabapentin 300 MG capsule  Commonly known as: NEURONTIN  Take 1 capsule (300 mg total) by mouth 3 (three) times daily.  What changed: how much to take            CONTINUE taking these medications      ACCU-CHEK SOFTCLIX LANCETS Misc  Generic drug: lancets  Use to check blood glucose 4 times daily     ELIQUIS 5 mg Tab  Generic drug: apixaban  Take 1 tablet (5 mg total) by mouth 2 (two) times daily.     EScitalopram oxalate 20 MG tablet  Commonly known as: LEXAPRO  Take 1 tablet (20 mg total) by mouth once daily.     folic acid 1 MG tablet  Commonly known as: FOLVITE  Take 1 tablet (1 mg total) by mouth once daily.     ibuprofen 400 MG tablet  Commonly known as: ADVIL,MOTRIN  Take 400 mg by mouth 3 (three) times daily.     levothyroxine 75 MCG tablet  Commonly known as: SYNTHROID  Take 1 tablet (75 mcg total) by mouth before breakfast.     metFORMIN 500 MG ER 24hr tablet  Commonly known as:  GLUCOPHAGE-XR  Take 500 mg by mouth 2 (two) times daily with meals.     metoprolol succinate 50 MG 24 hr tablet  Commonly known as: TOPROL-XL  Take 1 tablet (50 mg total) by mouth once daily.     multivitamin Tab  Take 1 tablet by mouth once daily.     omeprazole 20 MG capsule  Commonly known as: PRILOSEC  Take 1 capsule (20 mg total) by mouth once daily.     ondansetron 4 MG tablet  Commonly known as: ZOFRAN  Take 4 mg by mouth daily as needed for Nausea.     traZODone 100 MG tablet  Commonly known as: DESYREL  Take 2 tablets (200 mg total) by mouth every evening.            STOP taking these medications      buPROPion 200 MG Sr12  Commonly known as: WELLBUTRIN SR     losartan 25 MG tablet  Commonly known as: COZAAR     OLANZapine 7.5 MG tablet  Commonly known as: ZyPREXA     predniSONE 20 MG tablet  Commonly known as: DELTASONE            ASK your doctor about these medications      magnesium oxide 400 mg (241.3 mg magnesium) tablet  Commonly known as: MAG-OX  Take 1 tablet (400 mg total) by mouth once daily. for 7 days  Ask about: Should I take this medication?              Indwelling Lines/Drains at time of discharge:   Lines/Drains/Airways       None                   Time spent on the discharge of patient: 45 minutes         Dalila Mccormick MD  Department of Hospital Medicine  [facility] Surg ([place])

## 2024-08-19 ENCOUNTER — TELEPHONE (OUTPATIENT)
Dept: HEMATOLOGY/ONCOLOGY | Facility: CLINIC | Age: 64
End: 2024-08-19
Payer: COMMERCIAL

## 2024-08-19 ENCOUNTER — TELEPHONE (OUTPATIENT)
Dept: INTERNAL MEDICINE | Facility: CLINIC | Age: 64
End: 2024-08-19
Payer: COMMERCIAL

## 2024-08-19 NOTE — TELEPHONE ENCOUNTER
----- Message from Gladis Guadarrama, Patient Care Assistant sent at 8/19/2024  2:16 PM CDT -----  Type: Needs Medical Advice  Who Called:  dunia ( care taker )   Best Call Back Number: [phone]      Additional Information: dunia states telly  needs a follow up with provider  dunia  has some concerns , please call to [place] discuss , thank you .

## 2024-08-19 NOTE — TELEPHONE ENCOUNTER
"Scheduled with Michael 08/23/24.        Spoke with Jess, patient's caregiver. Requesting earlier appointment than 08/26/24 due to noticeable increase in symptoms: hand shaking (more neuro/Parkinsonian in nature), general weakness but gait steady and suspected high blood glucose related to poor diet. HOSP from 08/06/24-08/08/24.    Informed Jess I would notify Dr. Rodriguez and see if appointment can be made sometime this week. Jess expressed understanding and gratitude for phone call.  Call ended.      "      ----- Message from Xenia Horne sent at 8/16/2024  4:22 PM CDT -----  Regarding: call back  Type: Patient Call Back     Who called: Jess pt therapeutic aid      What is the request in detail: requesting call to discuss rescheduling pt hospital f/u appt for a sooner date, ideally for early next week     Can the clinic reply by [facility]?no     Would the patient rather a call back or a response via [facility]? call     Best call back number: [phone]     Additional Information:         "  "

## 2024-08-19 NOTE — TELEPHONE ENCOUNTER
----- Message from Xenia Horne sent at 8/16/2024  4:22 PM CDT -----  Regarding: call back  Type: Patient Call Back    Who called: osmar Mercado therapeutic aid     What is the request in detail: requesting call to discuss rescheduling pt hospital f/u appt for a sooner date, ideally for early next week    Can the clinic reply by [facility]?no    Would the patient rather a call back or a response via [facility]? call    Best call back number: [phone]    Additional Information:

## 2024-08-20 ENCOUNTER — LAB VISIT (OUTPATIENT)
Dept: LAB | Facility: HOSPITAL | Age: 64
End: 2024-08-20
Payer: COMMERCIAL

## 2024-08-20 ENCOUNTER — OFFICE VISIT (OUTPATIENT)
Dept: HEMATOLOGY/ONCOLOGY | Facility: CLINIC | Age: 64
End: 2024-08-20
Payer: COMMERCIAL

## 2024-08-20 VITALS
SYSTOLIC BLOOD PRESSURE: 128 MMHG | HEIGHT: 66 IN | HEART RATE: 76 BPM | OXYGEN SATURATION: 93 % | BODY MASS INDEX: 32.88 KG/M2 | WEIGHT: 204.56 LBS | RESPIRATION RATE: 18 BRPM | DIASTOLIC BLOOD PRESSURE: 62 MMHG | TEMPERATURE: 98 F

## 2024-08-20 DIAGNOSIS — C91.10 CLL (CHRONIC LYMPHOCYTIC LEUKEMIA): Primary | ICD-10-CM

## 2024-08-20 DIAGNOSIS — C91.10 CLL (CHRONIC LYMPHOCYTIC LEUKEMIA): ICD-10-CM

## 2024-08-20 LAB
ALBUMIN SERPL BCP-MCNC: 3.9 G/DL (ref 3.5–5.2)
ALP SERPL-CCNC: 68 U/L (ref 55–135)
ALT SERPL W/O P-5'-P-CCNC: 61 U/L (ref 10–44)
ANION GAP SERPL CALC-SCNC: 8 MMOL/L (ref 8–16)
ANISOCYTOSIS BLD QL SMEAR: SLIGHT
AST SERPL-CCNC: 53 U/L (ref 10–40)
BASOPHILS # BLD AUTO: 0.05 K/UL (ref 0–0.2)
BASOPHILS NFR BLD: 0.7 % (ref 0–1.9)
BILIRUB SERPL-MCNC: 0.4 MG/DL (ref 0.1–1)
BUN SERPL-MCNC: 7 MG/DL (ref 8–23)
CALCIUM SERPL-MCNC: 9.2 MG/DL (ref 8.7–10.5)
CHLORIDE SERPL-SCNC: 103 MMOL/L (ref 95–110)
CO2 SERPL-SCNC: 28 MMOL/L (ref 23–29)
CREAT SERPL-MCNC: 0.9 MG/DL (ref 0.5–1.4)
DIFFERENTIAL METHOD BLD: ABNORMAL
EOSINOPHIL # BLD AUTO: 0.1 K/UL (ref 0–0.5)
EOSINOPHIL NFR BLD: 1.7 % (ref 0–8)
ERYTHROCYTE [DISTWIDTH] IN BLOOD BY AUTOMATED COUNT: 17.5 % (ref 11.5–14.5)
EST. GFR  (NO RACE VARIABLE): >60 ML/MIN/1.73 M^2
GLUCOSE SERPL-MCNC: 155 MG/DL (ref 70–110)
HCT VFR BLD AUTO: 35.8 % (ref 37–48.5)
HGB BLD-MCNC: 10.4 G/DL (ref 12–16)
HYPOCHROMIA BLD QL SMEAR: ABNORMAL
IMM GRANULOCYTES # BLD AUTO: 0.01 K/UL (ref 0–0.04)
IMM GRANULOCYTES NFR BLD AUTO: 0.1 % (ref 0–0.5)
LDH SERPL L TO P-CCNC: 210 U/L (ref 110–260)
LYMPHOCYTES # BLD AUTO: 2.5 K/UL (ref 1–4.8)
LYMPHOCYTES NFR BLD: 35.5 % (ref 18–48)
MCH RBC QN AUTO: 25.1 PG (ref 27–31)
MCHC RBC AUTO-ENTMCNC: 29.1 G/DL (ref 32–36)
MCV RBC AUTO: 87 FL (ref 82–98)
MONOCYTES # BLD AUTO: 0.6 K/UL (ref 0.3–1)
MONOCYTES NFR BLD: 8.1 % (ref 4–15)
NEUTROPHILS # BLD AUTO: 3.7 K/UL (ref 1.8–7.7)
NEUTROPHILS NFR BLD: 53.9 % (ref 38–73)
NRBC BLD-RTO: 0 /100 WBC
OVALOCYTES BLD QL SMEAR: ABNORMAL
PLATELET # BLD AUTO: 127 K/UL (ref 150–450)
PMV BLD AUTO: 10.7 FL (ref 9.2–12.9)
POIKILOCYTOSIS BLD QL SMEAR: SLIGHT
POLYCHROMASIA BLD QL SMEAR: ABNORMAL
POTASSIUM SERPL-SCNC: 4.4 MMOL/L (ref 3.5–5.1)
PROT SERPL-MCNC: 7 G/DL (ref 6–8.4)
RBC # BLD AUTO: 4.14 M/UL (ref 4–5.4)
SODIUM SERPL-SCNC: 139 MMOL/L (ref 136–145)
SPHEROCYTES BLD QL SMEAR: ABNORMAL
WBC # BLD AUTO: 6.92 K/UL (ref 3.9–12.7)

## 2024-08-20 PROCEDURE — 85025 COMPLETE CBC W/AUTO DIFF WBC: CPT | Performed by: INTERNAL MEDICINE

## 2024-08-20 PROCEDURE — 3008F BODY MASS INDEX DOCD: CPT | Mod: CPTII,S$GLB,, | Performed by: INTERNAL MEDICINE

## 2024-08-20 PROCEDURE — 80053 COMPREHEN METABOLIC PANEL: CPT | Performed by: INTERNAL MEDICINE

## 2024-08-20 PROCEDURE — 1101F PT FALLS ASSESS-DOCD LE1/YR: CPT | Mod: CPTII,S$GLB,, | Performed by: INTERNAL MEDICINE

## 2024-08-20 PROCEDURE — 36415 COLL VENOUS BLD VENIPUNCTURE: CPT | Performed by: INTERNAL MEDICINE

## 2024-08-20 PROCEDURE — 1125F AMNT PAIN NOTED PAIN PRSNT: CPT | Mod: CPTII,S$GLB,, | Performed by: INTERNAL MEDICINE

## 2024-08-20 PROCEDURE — 83615 LACTATE (LD) (LDH) ENZYME: CPT | Performed by: INTERNAL MEDICINE

## 2024-08-20 PROCEDURE — 3288F FALL RISK ASSESSMENT DOCD: CPT | Mod: CPTII,S$GLB,, | Performed by: INTERNAL MEDICINE

## 2024-08-20 PROCEDURE — 1111F DSCHRG MED/CURRENT MED MERGE: CPT | Mod: CPTII,S$GLB,, | Performed by: INTERNAL MEDICINE

## 2024-08-20 PROCEDURE — 99999 PR PBB SHADOW E&M-EST. PATIENT-LVL IV: CPT | Mod: PBBFAC,,, | Performed by: INTERNAL MEDICINE

## 2024-08-20 PROCEDURE — 3078F DIAST BP <80 MM HG: CPT | Mod: CPTII,S$GLB,, | Performed by: INTERNAL MEDICINE

## 2024-08-20 PROCEDURE — 4010F ACE/ARB THERAPY RXD/TAKEN: CPT | Mod: CPTII,S$GLB,, | Performed by: INTERNAL MEDICINE

## 2024-08-20 PROCEDURE — 3044F HG A1C LEVEL LT 7.0%: CPT | Mod: CPTII,S$GLB,, | Performed by: INTERNAL MEDICINE

## 2024-08-20 PROCEDURE — 99214 OFFICE O/P EST MOD 30 MIN: CPT | Mod: S$GLB,,, | Performed by: INTERNAL MEDICINE

## 2024-08-20 PROCEDURE — 3074F SYST BP LT 130 MM HG: CPT | Mod: CPTII,S$GLB,, | Performed by: INTERNAL MEDICINE

## 2024-08-20 NOTE — PROGRESS NOTES
SECTION OF HEMATOLOGY AND BONE MARROW TRANSPLANT  Return Patient Visit   08/20/2024    CHIEF COMPLAINT: No chief complaint on file.        HISTORY OF PRESENT ILLNESS:   Self referred to me august 2022 for CLL/MBCL; was seen previously by dr. Gonzalez with recommendations for observation.    Of note cll fish done on pb and favorable 13 q del noted.  Not tp53 or IgHV sequencing sent.           Of note underwent bilateral mastectomy for  T1b N0 stage IA breast cancer October 2020 with no adjuvant therapy and was started on  Letrozole February 2021-may 2021.  Of note was referred to Dr. Gonzalez June 2022 for leukocytosis with flow cytometry of PB cw with cll vs other indolent b cell lymphoma phenotype.       Continues to struggle with etoh dependence, currently abstinent.       Otherwise clinically stable.  In clinic with friend.     No signs/symptoms of CLL progression/transformation.       PAST MEDICAL HISTORY:   Past Medical History:   Diagnosis Date    Alcoholism     c/b alcohol withdrawl seizures 7/2017    Anemia     Aortic atherosclerosis 04/17/2024    Cancer of breast 10/2020    s/p bilateral mastectomy for  T1b N0 stage IA breast cancer October 2020    CLL (chronic lymphocytic leukemia) 09/30/2022 6/22/22 - PB flow cytometry  Immunophenotyping of peripheral blood detects a distinct kappa light chain restricted monoclonal B-cell population  (calculated at 2.44x10 9 /L, from the most recent CBC showing a total WBC of  7.35 K/uL with 61% total lymphocytes)  with a CLL phenotype (coexpression of CD19, CD5, CD23 and dim CD20). CD22 (FITC), FMC-7 and CD38 are negative in this population.    Controlled type 2 diabetes mellitus without complication, without long-term current use of insulin 11/30/2021    COPD (chronic obstructive pulmonary disease)     Depression     Diverticulitis     Fatty liver     GERD (gastroesophageal reflux disease)     Hyperlipidemia     Hypertension     Pancreatitis     Peptic ulcer disease      Polysubstance abuse     Posterior reversible encephalopathy syndrome     Sarcoidosis of lung     over 30 yrs ago    Suicide attempt        PAST SURGICAL HISTORY:   Past Surgical History:   Procedure Laterality Date    APPENDECTOMY      BILATERAL MASTECTOMY Bilateral 10/29/2020    Procedure: MASTECTOMY, BILATERAL;  Surgeon: Baylee Kevin MD;  Location: [address];  Service: General;  Laterality: Bilateral;    BREAST REVISION SURGERY Bilateral 2/11/2021    Procedure: BREAST REVISION SURGERY;  Surgeon: Scottie Johnson MD;  Location: [address];  Service: Plastics;  Laterality: Bilateral;    COLONOSCOPY N/A 7/28/2017    Procedure: COLONOSCOPY;  Surgeon: Aaron Alvarado MD;  Location: [facility];  Service: Endoscopy;  Laterality: N/A;    ESOPHAGOGASTRODUODENOSCOPY  10/7/2016, 11/6/2014    2016 - gastritis, duodenitis, 2014 erosive gastritis    ESOPHAGOGASTRODUODENOSCOPY N/A 2/11/2020    Procedure: ESOPHAGOGASTRODUODENOSCOPY (EGD);  Surgeon: Fawn Garrido MD;  Location: [facility];  Service: Endoscopy;  Laterality: N/A;    ESOPHAGOGASTRODUODENOSCOPY N/A 4/19/2021    Procedure: EGD (ESOPHAGOGASTRODUODENOSCOPY);  Surgeon: Paramjit Martino MD;  Location: [facility];  Service: Endoscopy;  Laterality: N/A;    FLEXIBLE SIGMOIDOSCOPY  11/06/2014    colitis    HYSTERECTOMY      IMPLANTATION OF PERMANENT SACRAL NERVE STIMULATOR N/A 7/12/2022    Procedure: INSERTION, NEUROSTIMULATOR, PERMANENT, SACRAL;  Surgeon: Juaquin Edwards MD;  Location: [address];  Service: Urology;  Laterality: N/A;  1hr    INJECTION FOR SENTINEL NODE IDENTIFICATION Right 10/29/2020    Procedure: INJECTION, FOR SENTINEL NODE IDENTIFICATION;  Surgeon: Baylee Kevin MD;  Location: [address];  Service: General;  Laterality: Right;    INJECTION OF JOINT Right 10/10/2019    Procedure: Injection, Joint RIGHT ILIOPSOAS BURSA/TENDON INJECTION AND RIGHT GLUTEAL TENDON INJECTION WITH STEROID AND LIDOCAINE;  Surgeon: Guillaume Rico  MD;  Location: [facility];  Service: Pain Management;  Laterality: Right;  NEEDS CONSENT    INSERTION OF BREAST TISSUE EXPANDER Bilateral 10/29/2020    Procedure: INSERTION, TISSUE EXPANDER, BREAST;  Surgeon: Scottie Johnson MD;  Location: [address];  Service: Plastics;  Laterality: Bilateral;  Right breast: 1082 g  Left breast: 1076 g    LIPOSUCTION Bilateral 2/11/2021    Procedure: LIPOSUCTION;  Surgeon: Scottie Johnson MD;  Location: [address];  Service: Plastics;  Laterality: Bilateral;    mediastenoscopy      REPLACEMENT OF IMPLANT OF BREAST Bilateral 2/11/2021    Procedure: REPLACEMENT, IMPLANT, BREAST;  Surgeon: Scottie Johnson MD;  Location: [address];  Service: Plastics;  Laterality: Bilateral;    SENTINEL LYMPH NODE BIOPSY Right 10/29/2020    Procedure: BIOPSY, LYMPH NODE, SENTINEL;  Surgeon: Baylee Kevin MD;  Location: [address];  Service: General;  Laterality: Right;    TONSILLECTOMY N/A 1970    TUBAL LIGATION         PAST SOCIAL HISTORY:   reports that she has been smoking vaping with nicotine and cigarettes. She started smoking about 33 years ago. She has a 15 pack-year smoking history. She has never used smokeless tobacco. She reports current alcohol use. She reports current drug use. Drug: Marijuana.    FAMILY HISTORY:  Family History   Problem Relation Name Age of Onset    Heart attack Father      Diabetes Father      Hypertension Father      Diabetes Mother      Hypertension Mother      Breast cancer Maternal Aunt      Colon cancer Maternal Uncle      Breast cancer Daughter      Ovarian cancer Neg Hx      Cancer Neg Hx         CURRENT MEDICATIONS:   Current Outpatient Medications   Medication Sig    apixaban (ELIQUIS) 5 mg Tab Take 1 tablet (5 mg total) by mouth 2 (two) times daily.    ibuprofen (ADVIL,MOTRIN) 400 MG tablet Take 400 mg by mouth 3 (three) times daily.    lancets Misc Use to check blood glucose 4 times daily    levothyroxine  (SYNTHROID) 75 MCG tablet Take 1 tablet (75 mcg total) by mouth before breakfast.    metFORMIN (GLUCOPHAGE-XR) 500 MG ER 24hr tablet Take 500 mg by mouth 2 (two) times daily with meals.    metoprolol succinate (TOPROL-XL) 50 MG 24 hr tablet Take 1 tablet (50 mg total) by mouth once daily.    multivitamin Tab Take 1 tablet by mouth once daily.    omeprazole (PRILOSEC) 20 MG capsule Take 1 capsule (20 mg total) by mouth once daily.    ondansetron (ZOFRAN) 4 MG tablet Take 4 mg by mouth daily as needed for Nausea.    diazePAM (VALIUM) 5 MG tablet Take 2 tablets (10 mg total) by mouth every 6 (six) hours for 1 day, THEN 2 tablets (10 mg total) every 8 (eight) hours for 1 day, THEN 2 tablets (10 mg total) every 12 (twelve) hours for 1 day, THEN 1 tablet (5 mg total) every 12 (twelve) hours for 3 days.    EScitalopram oxalate (LEXAPRO) 20 MG tablet Take 1 tablet (20 mg total) by mouth once daily.    folic acid (FOLVITE) 1 MG tablet Take 1 tablet (1 mg total) by mouth once daily.    gabapentin (NEURONTIN) 300 MG capsule Take 1 capsule (300 mg total) by mouth 3 (three) times daily. (Patient taking differently: Take 400 mg by mouth 3 (three) times daily.)    traZODone (DESYREL) 100 MG tablet Take 2 tablets (200 mg total) by mouth every evening.     No current facility-administered medications for this visit.     Facility-Administered Medications Ordered in Other Visits   Medication    albuterol sulfate nebulizer solution 2.5 mg     ALLERGIES:   Review of patient's allergies indicates:   Allergen Reactions    Lortab [hydrocodone-acetaminophen] Itching    Promethazine Itching and Other (See Comments)    Albuterol      Other Reaction(s): CONFUSION           REVIEW OF SYSTEMS:    See hpi    PHYSICAL EXAM:      Vitals:    08/20/24 0952   BP: 128/62   Pulse: 76   Resp: 18   Temp: 98.4 °F (36.9 °C)          General - well developed, well nourished, no apparent distress  HEENT - oropharynx clear  Chest and Lung - clear to  auscultation bilaterally   Cardiovascular - RRR with no MGR, normal S1 and S2  Abdomen-  soft, nontender, no palpable hepatomegaly or splenomegaly  Lymph - no palpable lymphadenopathy  Heme - no bruising, petechiae, pallor  Skin - no rashes or lesions  Psych - appropriate mood and affect      ECOG Performance Status: (foot note - ECOG PS provided by [employer]cology [employer]) 1 - Symptomatic but completely ambulatory    Karnofsky Performance Score:  90%- Able to Carry on Normal Activity: Minor Symptoms of Disease  DATA:   Lab Results   Component Value Date    WBC 6.92 08/20/2024    HGB 10.4 (L) 08/20/2024    HCT 35.8 (L) 08/20/2024    MCV 87 08/20/2024     (L) 08/20/2024       Gran # (ANC)   Date Value Ref Range Status   08/08/2024 1.4 (L) 1.8 - 7.7 K/uL Final     Gran %   Date Value Ref Range Status   08/08/2024 39.9 38.0 - 73.0 % Final     Lymph #   Date Value Ref Range Status   08/08/2024 1.5 1.0 - 4.8 K/uL Final     Lymph %   Date Value Ref Range Status   08/08/2024 44.1 18.0 - 48.0 % Final     CMP  Sodium   Date Value Ref Range Status   08/08/2024 138 136 - 145 mmol/L Final     Potassium   Date Value Ref Range Status   08/08/2024 3.9 3.5 - 5.1 mmol/L Final     Chloride   Date Value Ref Range Status   08/08/2024 104 95 - 110 mmol/L Final     CO2   Date Value Ref Range Status   08/08/2024 26 23 - 29 mmol/L Final     Glucose   Date Value Ref Range Status   08/08/2024 223 (H) 70 - 110 mg/dL Final     BUN   Date Value Ref Range Status   08/08/2024 5 (L) 8 - 23 mg/dL Final     Creatinine   Date Value Ref Range Status   08/08/2024 0.8 0.5 - 1.4 mg/dL Final     Calcium   Date Value Ref Range Status   08/08/2024 8.1 (L) 8.7 - 10.5 mg/dL Final     Total Protein   Date Value Ref Range Status   08/08/2024 5.3 (L) 6.0 - 8.4 g/dL Final     Albumin   Date Value Ref Range Status   08/08/2024 3.2 (L) 3.5 - 5.2 g/dL Final     Total Bilirubin   Date Value Ref Range Status   08/08/2024 0.3 0.1 - 1.0 mg/dL Final      Comment:     For infants and newborns, interpretation of results should be based  on gestational age, weight and in agreement with clinical  observations.    Premature Infant recommended reference ranges:  Up to 24 hours.............<8.0 mg/dL  Up to 48 hours............<12.0 mg/dL  3-5 days..................<15.0 mg/dL  6-29 days.................<15.0 mg/dL       Alkaline Phosphatase   Date Value Ref Range Status   08/08/2024 45 (L) 55 - 135 U/L Final     AST   Date Value Ref Range Status   08/08/2024 34 10 - 40 U/L Final     ALT   Date Value Ref Range Status   08/08/2024 28 10 - 44 U/L Final     Anion Gap   Date Value Ref Range Status   08/08/2024 8 8 - 16 mmol/L Final     eGFR if    Date Value Ref Range Status   06/22/2022 >60.0 >60 mL/min/1.73 m^2 Final     eGFR if non    Date Value Ref Range Status   06/22/2022 >60.0 >60 mL/min/1.73 m^2 Final     Comment:     Calculation used to obtain the estimated glomerular filtration  rate (eGFR) is the CKD-EPI equation.        CT ap with contrast - may 2022  COMPARISON:  04/17/2021     FINDINGS:  Left basilar discoid atelectasis/scarring noted.     Liver demonstrates diffuse hypoattenuation in keeping with steatosis and appears enlarged, measuring 19 cm.  No focal suspicious lesions.  No calcified gallstones.  No intrahepatic or extrahepatic biliary dilatation.  Pancreas is unremarkable.  Spleen is enlarged, measuring 16.4 x 5.6 cm.  2.0 cm hypoattenuating lesion at the inferior aspect is stable.  Adrenal glands are unremarkable.  No hydronephrosis. There is asymmetric right perinephric stranding with retroperitoneal/periureteral stranding extending to the pelvis.  Mild right ureteral wall thickening noted.  There is multifocal abnormal enhancement within the right renal parenchyma in keeping with pyelonephritis.  No evidence for urolithiasis.     GI tract demonstrates no evidence for obstruction or inflammation.  Multiple colonic  diverticula are noted.     Prominent lymph nodes are noted at the nolvia hepatis, largest measuring 1.5 cm short axis.     No ascites.     Abdominal aorta is normal caliber, noting calcified plaque.     Status post hysterectomy.  No adnexal masses.  Urinary bladder is unremarkable.     Regional osseous structures demonstrate no aggressive appearing lytic or blastic lesions.  There are postoperative changes of in the lower lumbar spine.  Note made of partially visualized breast implants.     Impression:     1. Right perinephric/periureteral stranding with mild ureteral wall thickening.  Abnormal enhancement of the right kidney in keeping with pyelonephritis.  2. Hepatomegaly with steatosis.  3. Splenomegaly.  4. Stable periportal lymphadenopathy, likely reactive.      6/22/22 - PB flow cytometry  Immunophenotyping of peripheral blood detects a distinct kappa light chain   restricted monoclonal B-cell population  (calculated at 2.44x10   9 /L, from the most recent CBC showing a total WBC of  7.35 K/uL with 61%   total lymphocytes)  with a CLL phenotype (coexpression of CD19, CD5, CD23 and   dim CD20). CD22 (FITC), FMC-7 and CD38 are negative in this population.   Without associated lymphadenopathy, organomegaly, other extramedullary   involvement, or any other features of a B-cell lymphoproliferative disorder,   a monoclonal B-cell count   <5x10 9 /L in the peripheral blood should be classified as monoclonal B-cell   lymphocytosis (MBL) (WHO Classification of Tumors of Hematopoietic and   Lymphoid Tissues, 2017). Correlation with clinical presentation and other   laboratory results is required.     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     In CLL, a 13q deletion is associated with a favorable   prognosis (Green et al., Blood 127:6187-1264, 2016; Van   Glenke et al., Brit J Haematol 173:105-113, 2016).     ASSESSMENT AND PLAN:    Encounter Diagnosis   Name Primary?    CLL (chronic lymphocytic leukemia) Yes         -MBCL/CLL; did meet CLL criteria on one cbc June 2022 but has since returned to MBCL criteria  -confirmed on June 2022 PB flow cytometry  -continues to be asymptomatic from CLL hematologic perspective with normal physical exam   -no indication for imaging or marrow biopsy currently   -favorable risk 13 q del by fish; for furhter prognostication have today sent  tp53 sequencing and ighv mutation which is pending   -she has chronic mild anemia/thrombocytopenia that is likely due to her etoh related liver disease for which she follows with hepatology;   normal nutritional evaluation jan 2022; do not feel cytopenias are related to her Lymphoproliferative disorder   -will follow pt q 12  months with labs and provider appts  -educated on symptoms for which to seek attn in our clinic earlier   -fu with med onc for breast cancer recurrence surveillance    Fu: cbc, cmp, ldh, and NP  appt in 12  months        Tristin Montano MD  Hematology/Oncology/Bone Marrow Transplant

## 2024-08-23 ENCOUNTER — OFFICE VISIT (OUTPATIENT)
Dept: INTERNAL MEDICINE | Facility: CLINIC | Age: 64
End: 2024-08-23
Attending: INTERNAL MEDICINE
Payer: COMMERCIAL

## 2024-08-23 VITALS
HEART RATE: 69 BPM | BODY MASS INDEX: 32.1 KG/M2 | SYSTOLIC BLOOD PRESSURE: 116 MMHG | WEIGHT: 199.75 LBS | DIASTOLIC BLOOD PRESSURE: 70 MMHG | HEIGHT: 66 IN | OXYGEN SATURATION: 94 %

## 2024-08-23 DIAGNOSIS — R19.7 DIARRHEA, UNSPECIFIED TYPE: ICD-10-CM

## 2024-08-23 DIAGNOSIS — G47.33 OSA (OBSTRUCTIVE SLEEP APNEA): Primary | ICD-10-CM

## 2024-08-23 DIAGNOSIS — F03.90 DEMENTIA, UNSPECIFIED DEMENTIA SEVERITY, UNSPECIFIED DEMENTIA TYPE, UNSPECIFIED WHETHER BEHAVIORAL, PSYCHOTIC, OR MOOD DISTURBANCE OR ANXIETY: ICD-10-CM

## 2024-08-23 DIAGNOSIS — Z86.2 HISTORY OF SARCOIDOSIS: Chronic | ICD-10-CM

## 2024-08-23 DIAGNOSIS — I10 ESSENTIAL HYPERTENSION: Chronic | ICD-10-CM

## 2024-08-23 DIAGNOSIS — R11.0 CHRONIC NAUSEA: ICD-10-CM

## 2024-08-23 DIAGNOSIS — F10.20 CHRONIC ALCOHOL DEPENDENCE, CONTINUOUS: ICD-10-CM

## 2024-08-23 PROCEDURE — 1101F PT FALLS ASSESS-DOCD LE1/YR: CPT | Mod: CPTII,S$GLB,, | Performed by: INTERNAL MEDICINE

## 2024-08-23 PROCEDURE — 99214 OFFICE O/P EST MOD 30 MIN: CPT | Mod: S$GLB,,, | Performed by: INTERNAL MEDICINE

## 2024-08-23 PROCEDURE — 1111F DSCHRG MED/CURRENT MED MERGE: CPT | Mod: CPTII,S$GLB,, | Performed by: INTERNAL MEDICINE

## 2024-08-23 PROCEDURE — 3074F SYST BP LT 130 MM HG: CPT | Mod: CPTII,S$GLB,, | Performed by: INTERNAL MEDICINE

## 2024-08-23 PROCEDURE — 4010F ACE/ARB THERAPY RXD/TAKEN: CPT | Mod: CPTII,S$GLB,, | Performed by: INTERNAL MEDICINE

## 2024-08-23 PROCEDURE — 1160F RVW MEDS BY RX/DR IN RCRD: CPT | Mod: CPTII,S$GLB,, | Performed by: INTERNAL MEDICINE

## 2024-08-23 PROCEDURE — 3008F BODY MASS INDEX DOCD: CPT | Mod: CPTII,S$GLB,, | Performed by: INTERNAL MEDICINE

## 2024-08-23 PROCEDURE — 99999 PR PBB SHADOW E&M-EST. PATIENT-LVL V: CPT | Mod: PBBFAC,,, | Performed by: INTERNAL MEDICINE

## 2024-08-23 PROCEDURE — G2211 COMPLEX E/M VISIT ADD ON: HCPCS | Mod: S$GLB,,, | Performed by: INTERNAL MEDICINE

## 2024-08-23 PROCEDURE — 1159F MED LIST DOCD IN RCRD: CPT | Mod: CPTII,S$GLB,, | Performed by: INTERNAL MEDICINE

## 2024-08-23 PROCEDURE — 3044F HG A1C LEVEL LT 7.0%: CPT | Mod: CPTII,S$GLB,, | Performed by: INTERNAL MEDICINE

## 2024-08-23 PROCEDURE — 3288F FALL RISK ASSESSMENT DOCD: CPT | Mod: CPTII,S$GLB,, | Performed by: INTERNAL MEDICINE

## 2024-08-23 PROCEDURE — 3078F DIAST BP <80 MM HG: CPT | Mod: CPTII,S$GLB,, | Performed by: INTERNAL MEDICINE

## 2024-08-23 RX ORDER — NALTREXONE HYDROCHLORIDE 50 MG/1
50 TABLET, FILM COATED ORAL DAILY
COMMUNITY

## 2024-08-23 RX ORDER — LOSARTAN POTASSIUM 25 MG/1
25 TABLET ORAL DAILY
COMMUNITY

## 2024-08-23 RX ORDER — DONEPEZIL HYDROCHLORIDE 5 MG/1
1 TABLET, FILM COATED ORAL NIGHTLY
COMMUNITY
Start: 2024-07-29 | End: 2025-07-29

## 2024-08-23 RX ORDER — QUETIAPINE FUMARATE 50 MG/1
50 TABLET, FILM COATED ORAL NIGHTLY
COMMUNITY
Start: 2024-07-29

## 2024-08-23 RX ORDER — GLYCOPYRROLATE AND FORMOTEROL FUMARATE 9; 4.8 UG/1; UG/1
2 AEROSOL, METERED RESPIRATORY (INHALATION) 2 TIMES DAILY
COMMUNITY
Start: 2024-08-13

## 2024-08-23 RX ORDER — TIZANIDINE 4 MG/1
TABLET ORAL
COMMUNITY
Start: 2024-07-29

## 2024-08-23 RX ORDER — BUPROPION HYDROCHLORIDE 150 MG/1
150 TABLET, EXTENDED RELEASE ORAL EVERY MORNING
COMMUNITY
Start: 2024-04-04

## 2024-08-23 RX ORDER — ESCITALOPRAM OXALATE 10 MG/1
10 TABLET ORAL DAILY
COMMUNITY

## 2024-08-23 RX ORDER — ATOGEPANT 60 MG/1
60 TABLET ORAL DAILY
COMMUNITY

## 2024-08-23 RX ORDER — LANOLIN ALCOHOL/MO/W.PET/CERES
400 CREAM (GRAM) TOPICAL DAILY
COMMUNITY

## 2024-08-23 RX ORDER — SUCRALFATE 1 G/1
1 TABLET ORAL 4 TIMES DAILY
COMMUNITY
Start: 2024-07-10

## 2024-08-23 NOTE — PROGRESS NOTES
Subjective:       Patient ID: Earl Abdul is a 64 y.o. female.    Chief Complaint: Hospital Follow Up    Here for hospital f/u     PMHx of severe ETOH abuse, anemia, breast CA, CLL, DM2, COPD, depression, HTN, HLD, GERD, sarcoidosis, nicotine dependence, and fatty liver     Admitted 8/5/24-8/8/24 for hypotension due to acute intoxication. She presented due to hypotension and lightheadedness. She took her blood pressure and saw that it was low. States that she feels lightheaded worse with standing.  pt initially hypotensive which quickly improved with IVF, also noted to be tachypneic and was placed on bipap due to being mildly hypercapnic for <1 hr and was weaned to 2L via nasal cannula, afebrile. CBC with stable anemia and thrombocytopenia. Na 135. Bicarb 18. Anion gap 17. Cr 1.4 (bl 0.8-0.9). Glucose 69. POC glucose 95. Total protein 5.9. BNP 47. Troponin 0.013. POC lactate 2.98>1.91. Lactate 1.7. EtOH 155. Blood cxs in process. pH 7.23>7.35, PCO2 53.3>49. EKG shows SR, 65 bpm, no acute ischemic changes. UA non infectious. CXR negative for acute process. The patient received 1L LR bolus, D10 bolus, D5 bolus, ibuprofen, zofran, IV thiamine, and IV valium 5mg.    for hypotension altered mental status that is likely secondary to intoxicated state. Pt initially acidotic which was resolved w/ Bipap. CXR w/o acute process. Infectious w/u negative. Serum ETOH 155. UDS w/ presumptive amphetamines and benzos. Pt treated w/ IV dextrose, IV thiamine, and valium taper. Addiction psych consulted. She was started on IP Valium taper. She was seen by psychiatry/addiction medicine multiple times. Patient reported feeling good on taper. No symptoms and wants to go home. She denied having cravings. She was stable and ready for discharge on Valium taper as recommended by psych. She was given resources for alcohol cessation programs.    She is accompanied here today by her new aid. Pt is getting in 1-2 glucerna a day. Reports  "chronic nausea, fatigue, weakness, loose stools. These symptoms are chronic. Reports last etoh was 60 days prior. Discussed with pt that most recent hospitalization was < 3 weeks prior and she was acutely intoxicated on presentation and PEth +.       Following with [facility] who is concerned for FTD vs Alzheimer's     Dr izaguirre along with functional MD    Cutting down on cigarettes. Starting on FODMAP diet recommend by chad BECERRA. She is starting with 60 seconds a day. Spending all day in bed. Her tremors have increased. BG was 119.   Cramping in feet. Diarrhea.      LCV Heme (8/20/2024) Dr ZELAYA writes "MBCL/CLL; did meet CLL criteria on one cbc June 2022 but has since returned to MBCL criteria. -will follow pt q 12  months with labs and provider appts"            Review of Systems   Constitutional:  Positive for fatigue. Negative for chills, fever and unexpected weight change.   HENT:  Negative for ear pain, hearing loss, postnasal drip, tinnitus, trouble swallowing and voice change.    Respiratory:  Negative for cough, chest tightness, shortness of breath and wheezing.    Cardiovascular:  Negative for chest pain, palpitations and leg swelling.   Gastrointestinal:  Positive for abdominal distention, nausea and vomiting. Negative for abdominal pain, blood in stool and diarrhea.   Endocrine: Negative for polydipsia, polyphagia and polyuria.   Genitourinary:  Negative for difficulty urinating, dysuria, hematuria and vaginal bleeding.   Skin:  Negative for rash.   Allergic/Immunologic: Negative for food allergies.   Neurological:  Negative for dizziness, numbness and headaches.   Hematological:  Does not bruise/bleed easily.   Psychiatric/Behavioral:  Positive for dysphoric mood and sleep disturbance. The patient is nervous/anxious.        Objective:      Vitals:    08/23/24 1346   BP: 116/70   BP Location: Right arm   Patient Position: Sitting   Pulse: 69   SpO2: (!) 94%   Weight: 90.6 kg (199 lb 11.8 oz) " "  Height: 5' 6" (1.676 m)      Physical Exam    Assessment:       1. VINICIO (obstructive sleep apnea)    2. Chronic alcohol dependence, continuous    3. Dementia, unspecified dementia severity, unspecified dementia type, unspecified whether behavioral, psychotic, or mood disturbance or anxiety    4. History of sarcoidosis    5. Essential hypertension    6. Diarrhea, unspecified type    7. Chronic nausea        Plan:       Earl was seen today for hospital follow up.    Diagnoses and all orders for this visit:    VINICIO (obstructive sleep apnea)  -     Ambulatory referral/consult to Sleep Disorders; Future    Chronic alcohol dependence, continuous   Office and Emergency Department prompts discussed.    Dementia, unspecified dementia severity, unspecified dementia type, unspecified whether behavioral, psychotic, or mood disturbance or anxiety   F/u neuro. Aid aware of hx and need for f/u    History of sarcoidosis    Essential hypertension   Controlled and asymptomatic.  Continue current Rx regimen.    Diarrhea, unspecified type   Needs more fiber in diet.   Chronic nausea   Chronic etoh related. Continue antacid and abstain from etoh and f/u GI prn. Office and Emergency Department prompts discussed.       Visit today is associated with current or anticipated ongoing medical care related to this patient's single serious condition/complex condition of dementia, severe alcoholism, HTN, sarcoid, CLL, . The patient will return to see me as these issues will be followed longitudinally.      Andrew Chase MD  Internal Medicine-[facility]        Side effects of medication(s) were discussed in detail and patient voiced understanding.  Patient will call back for any issues or complications.     " none

## 2024-08-26 ENCOUNTER — TELEPHONE (OUTPATIENT)
Dept: ORTHOPEDICS | Facility: CLINIC | Age: 64
End: 2024-08-26
Payer: COMMERCIAL

## 2024-08-28 RX ORDER — LEVOTHYROXINE SODIUM 75 UG/1
75 TABLET ORAL
Qty: 90 TABLET | Refills: 1 | Status: SHIPPED | OUTPATIENT
Start: 2024-08-28

## 2024-08-28 NOTE — TELEPHONE ENCOUNTER
No care due was identified.  [facility] Embedded Care Due Messages. Reference number: [id number].   8/28/2024 8:33:10 AM CDT

## 2024-09-16 ENCOUNTER — OFFICE VISIT (OUTPATIENT)
Dept: PULMONOLOGY | Facility: CLINIC | Age: 64
End: 2024-09-16
Payer: COMMERCIAL

## 2024-09-16 VITALS
DIASTOLIC BLOOD PRESSURE: 74 MMHG | WEIGHT: 198 LBS | SYSTOLIC BLOOD PRESSURE: 138 MMHG | HEART RATE: 104 BPM | HEIGHT: 66 IN | BODY MASS INDEX: 31.82 KG/M2 | OXYGEN SATURATION: 95 %

## 2024-09-16 DIAGNOSIS — F17.210 CIGARETTE NICOTINE DEPENDENCE WITHOUT COMPLICATION: ICD-10-CM

## 2024-09-16 DIAGNOSIS — J96.11 CHRONIC HYPOXEMIC RESPIRATORY FAILURE: ICD-10-CM

## 2024-09-16 DIAGNOSIS — J44.9 CHRONIC OBSTRUCTIVE PULMONARY DISEASE, UNSPECIFIED COPD TYPE: Primary | ICD-10-CM

## 2024-09-16 PROCEDURE — 3288F FALL RISK ASSESSMENT DOCD: CPT | Mod: CPTII,S$GLB,, | Performed by: INTERNAL MEDICINE

## 2024-09-16 PROCEDURE — 1101F PT FALLS ASSESS-DOCD LE1/YR: CPT | Mod: CPTII,S$GLB,, | Performed by: INTERNAL MEDICINE

## 2024-09-16 PROCEDURE — 3078F DIAST BP <80 MM HG: CPT | Mod: CPTII,S$GLB,, | Performed by: INTERNAL MEDICINE

## 2024-09-16 PROCEDURE — 1159F MED LIST DOCD IN RCRD: CPT | Mod: CPTII,S$GLB,, | Performed by: INTERNAL MEDICINE

## 2024-09-16 PROCEDURE — 1126F AMNT PAIN NOTED NONE PRSNT: CPT | Mod: CPTII,S$GLB,, | Performed by: INTERNAL MEDICINE

## 2024-09-16 PROCEDURE — 4010F ACE/ARB THERAPY RXD/TAKEN: CPT | Mod: CPTII,S$GLB,, | Performed by: INTERNAL MEDICINE

## 2024-09-16 PROCEDURE — 3008F BODY MASS INDEX DOCD: CPT | Mod: CPTII,S$GLB,, | Performed by: INTERNAL MEDICINE

## 2024-09-16 PROCEDURE — 99214 OFFICE O/P EST MOD 30 MIN: CPT | Mod: S$GLB,,, | Performed by: INTERNAL MEDICINE

## 2024-09-16 PROCEDURE — 3044F HG A1C LEVEL LT 7.0%: CPT | Mod: CPTII,S$GLB,, | Performed by: INTERNAL MEDICINE

## 2024-09-16 PROCEDURE — 99999 PR PBB SHADOW E&M-EST. PATIENT-LVL IV: CPT | Mod: PBBFAC,,, | Performed by: INTERNAL MEDICINE

## 2024-09-16 PROCEDURE — 3075F SYST BP GE 130 - 139MM HG: CPT | Mod: CPTII,S$GLB,, | Performed by: INTERNAL MEDICINE

## 2024-09-16 RX ORDER — INSULIN GLARGINE 100 [IU]/ML
3 INJECTION, SOLUTION SUBCUTANEOUS DAILY
COMMUNITY
Start: 2024-07-09

## 2024-09-16 RX ORDER — TIOTROPIUM BROMIDE INHALATION SPRAY 3.12 UG/1
2 SPRAY, METERED RESPIRATORY (INHALATION) DAILY
Qty: 4 G | Refills: 11 | Status: SHIPPED | OUTPATIENT
Start: 2024-09-16
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
## 2024-09-18 ENCOUNTER — HOSPITAL ENCOUNTER (OUTPATIENT)
Dept: PULMONOLOGY | Facility: CLINIC | Age: 64
Discharge: HOME | End: 2024-09-18
Payer: COMMERCIAL

## 2024-09-18 VITALS — HEIGHT: 65 IN | WEIGHT: 198 LBS | BODY MASS INDEX: 32.99 KG/M2

## 2024-09-18 DIAGNOSIS — J44.9 CHRONIC OBSTRUCTIVE PULMONARY DISEASE, UNSPECIFIED COPD TYPE: ICD-10-CM

## 2024-09-18 LAB
DLCO SINGLE BREATH LLN: 16.75
DLCO SINGLE BREATH PRE REF: 52.4 %
DLCO SINGLE BREATH REF: 22.49
DLCOC SBVA LLN: 2.99
DLCOC SBVA REF: 4.4
DLCOC SINGLE BREATH LLN: 16.75
DLCOC SINGLE BREATH REF: 22.49
DLCOCSBVAULN: 5.81
DLCOCSINGLEBREATHULN: 28.22
DLCOSINGLEBREATHULN: 28.22
DLCOSINGLEBREATHZSCORE: -3.07
DLCOVA LLN: 2.99
DLCOVA PRE REF: 69.2 %
DLCOVA PRE: 3.05 ML/(MIN*MMHG*L) (ref 2.99–5.81)
DLCOVA REF: 4.4
DLCOVAULN: 5.81
ERVN2 LLN: -16449.28
ERVN2 PRE REF: 32.4 %
ERVN2 PRE: 0.23 L (ref -16449.28–16450.72)
ERVN2 REF: 0.72
ERVN2ULN: ABNORMAL
FEF 25 75 LLN: 1.34
FEF 25 75 PRE REF: 39.1 %
FEF 25 75 REF: 2.74
FET100 CHG: 15.9 %
FEV05 LLN: 0.95
FEV05 REF: 1.81
FEV1 CHG: 6.3 %
FEV1 FVC LLN: 66
FEV1 FVC PRE REF: 90 %
FEV1 FVC REF: 78
FEV1 LLN: 1.77
FEV1 PRE REF: 69 %
FEV1 REF: 2.39
FEV1 VOL CHG: 0.1
FEV1FVCZSCORE: -1.04
FEV1ZSCORE: -1.94
FRCN2 LLN: 1.94
FRCN2 PRE REF: 99.5 %
FRCN2 REF: 2.76
FRCN2ULN: 3.59
FVC CHG: 3.4 %
FVC LLN: 2.28
FVC PRE REF: 76.1 %
FVC REF: 3.07
FVC VOL CHG: 0.08
FVCZSCORE: -1.51
ICN2REF: 2.11
IVC PRE: 2.31 L (ref 2.28–3.91)
IVC SINGLE BREATH LLN: 2.28
IVC SINGLE BREATH PRE REF: 75.3 %
IVC SINGLE BREATH REF: 3.07
IVCSINGLEBREATHULN: 3.91
LLN IC N2: -16447.89
PEF LLN: 4.32
PEF PRE REF: 73 %
PEF REF: 6.09
PHYSICIAN COMMENT: ABNORMAL
POST FEF 25 75: 1.23 L/S (ref 1.34–4.14)
POST FET 100: 7.05 SEC
POST FEV1 FVC: 72.62 % (ref 65.69–89.4)
POST FEV1: 1.76 L (ref 1.77–2.99)
POST FEV5: 1.38 L (ref 0.95–2.66)
POST FVC: 2.42 L (ref 2.28–3.91)
POST PEF: 4.25 L/S (ref 4.32–7.85)
PRE DLCO: 11.78 ML/(MIN*MMHG) (ref 16.75–28.22)
PRE FEF 25 75: 1.07 L/S (ref 1.34–4.14)
PRE FET 100: 6.08 SEC
PRE FEV05 REF: 70.6 %
PRE FEV1 FVC: 70.64 % (ref 65.69–89.4)
PRE FEV1: 1.65 L (ref 1.77–2.99)
PRE FEV5: 1.28 L (ref 0.95–2.66)
PRE FRC N2: 2.75 L (ref 1.94–3.59)
PRE FVC: 2.34 L (ref 2.28–3.91)
PRE IC N2: 2.11 L (ref -16447.89–16452.11)
PRE PEF: 4.45 L/S (ref 4.32–7.85)
PRE REF IC N2: 99.9 %
RVN2 LLN: 1.47
RVN2 PRE REF: 123.2 %
RVN2 PRE: 2.52 L (ref 1.47–2.62)
RVN2 REF: 2.04
RVN2TLCN2 LLN: 31.81
RVN2TLCN2 PRE REF: 125.2 %
RVN2TLCN2 PRE: 51.82 % (ref 31.81–50.99)
RVN2TLCN2 REF: 41.4
RVN2TLCN2ULN: 50.99
RVN2ULN: 2.62
TLCN2 LLN: 4.12
TLCN2 PRE REF: 95.1 %
TLCN2 PRE: 4.86 L (ref 4.12–6.09)
TLCN2 REF: 5.11
TLCN2ULN: 6.09
TLCN2ZSCORE: -0.41
ULN IC N2: ABNORMAL
VA PRE: 3.86 L (ref 4.96–4.96)
VA SINGLE BREATH LLN: 4.96
VA SINGLE BREATH PRE REF: 77.9 %
VA SINGLE BREATH REF: 4.96
VASINGLEBREATHULN: 4.96
VCMAXN2 LLN: 2.28
VCMAXN2 PRE REF: 76.2 %
VCMAXN2 PRE: 2.34 L (ref 2.28–3.91)
VCMAXN2 REF: 3.07
VCMAXN2ULN: 3.91

## 2024-09-18 PROCEDURE — 94729 DIFFUSING CAPACITY: CPT | Mod: S$GLB,,, | Performed by: INTERNAL MEDICINE

## 2024-09-18 PROCEDURE — 94727 GAS DIL/WSHOT DETER LNG VOL: CPT | Mod: S$GLB,,, | Performed by: INTERNAL MEDICINE

## 2024-09-18 PROCEDURE — 94618 PULMONARY STRESS TESTING: CPT | Mod: S$GLB,,, | Performed by: INTERNAL MEDICINE

## 2024-09-18 PROCEDURE — 94060 EVALUATION OF WHEEZING: CPT | Mod: 59,S$GLB,, | Performed by: INTERNAL MEDICINE

## 2024-09-18 NOTE — PROCEDURES
Earl Abdul is a 64 y.o.   female patient, who presents for a 6 minute walk test ordered by MD Karen.  The diagnosis is Qualify for Oxygen; COPD.  The patient's BMI is 32.9 kg/m2.  Predicted distance (lower limit of normal) is 287.92 meters.      Test Results:    The test was completed with stops.  The patient stopped 1 time for a total of 23 seconds.  The total time walked was 337 seconds.  During walking, the patient reported:  Dyspnea, Lightheadedness, Other (Comment) (unsteady gait and weakness).  The patient used no assistive devices during testing.     09/18/2024---------Distance: 304.8 meters (1000 feet)     O2 Sat % Supplemental Oxygen Heart Rate Blood Pressure Anna Scale   Pre-exercise  (Resting) 97 % Room Air 71 bpm 127/77 mmHg 7-8   During Exercise 94 % Room Air 86 bpm 115/68 mmHg 7-8   Post-exercise  (Recovery) 96 % Room Air  79 bpm       Recovery Time: 72 seconds    Performing nurse/tech: Estopkika MERLOS      PREVIOUS STUDY:   03/28/2022---------Distance: 243.84 meters (800 feet)      O2 Sat % Supplemental Oxygen Heart Rate Blood Pressure Anna Scale   Pre-exercise  (Resting) 94 % Room Air 114 bpm 114/56 mmHg 4   During Exercise 90 % Room Air 125 bpm 124/57 mmHg 4   Post-exercise  (Recovery) 92 % Room Air  112 bpm   mmHg        CLINICAL INTERPRETATION:  Six minute walk distance is 304.8 meters (1000 feet) with very heavy dyspnea.  During exercise, there was no significant desaturation while breathing room air.  Both blood pressure and heart rate remained stable with walking.  The patient reported non-pulmonary symptoms during exercise.  The patient did complete the study, walking 337 seconds of the 360 second test.  Since the previous study in March 2022, exercise capacity is significantly improved.  Based upon age and body mass index, exercise capacity is normal.

## 2024-09-21 ENCOUNTER — PATIENT MESSAGE (OUTPATIENT)
Dept: PULMONOLOGY | Facility: CLINIC | Age: 64
End: 2024-09-21
Payer: COMMERCIAL

## 2024-09-23 ENCOUNTER — PATIENT MESSAGE (OUTPATIENT)
Dept: PULMONOLOGY | Facility: CLINIC | Age: 64
End: 2024-09-23
Payer: COMMERCIAL

## 2024-09-23 DIAGNOSIS — J44.9 CHRONIC OBSTRUCTIVE PULMONARY DISEASE, UNSPECIFIED COPD TYPE: Primary | ICD-10-CM

## 2024-09-23 RX ORDER — FLUTICASONE FUROATE 100 UG/1
1 POWDER RESPIRATORY (INHALATION) DAILY
Qty: 30 EACH | Refills: 11 | Status: SHIPPED | OUTPATIENT
Start: 2024-09-23

## 2024-09-23 RX ORDER — AZITHROMYCIN 250 MG/1
TABLET, FILM COATED ORAL
Qty: 6 TABLET | Refills: 0 | Status: SHIPPED | OUTPATIENT
Start: 2024-09-23 | End: 2024-09-28

## 2024-09-23 RX ORDER — PREDNISONE 10 MG/1
TABLET ORAL
Qty: 7 TABLET | Refills: 0 | Status: SHIPPED | OUTPATIENT
Start: 2024-09-23 | End: 2024-09-28

## 2024-09-25 DIAGNOSIS — J44.9 CHRONIC OBSTRUCTIVE PULMONARY DISEASE, UNSPECIFIED COPD TYPE: Primary | ICD-10-CM

## 2024-10-14 ENCOUNTER — PATIENT MESSAGE (OUTPATIENT)
Dept: INTERNAL MEDICINE | Facility: CLINIC | Age: 64
End: 2024-10-14
Payer: COMMERCIAL

## 2024-10-14 RX ORDER — VORTIOXETINE 5 MG/1
5 TABLET, FILM COATED ORAL EVERY MORNING
COMMUNITY

## 2024-10-14 RX ORDER — LANOLIN ALCOHOL/MO/W.PET/CERES
400 CREAM (GRAM) TOPICAL DAILY
COMMUNITY

## 2024-10-14 RX ORDER — PROPRANOLOL HYDROCHLORIDE 20 MG/1
20 TABLET ORAL 2 TIMES DAILY
COMMUNITY

## 2024-10-14 RX ORDER — AMOXICILLIN 500 MG
2 CAPSULE ORAL DAILY
COMMUNITY

## 2024-10-14 RX ORDER — NALTREXONE HYDROCHLORIDE 50 MG/1
50 TABLET, FILM COATED ORAL DAILY
COMMUNITY

## 2024-10-14 RX ORDER — LANOLIN ALCOHOL/MO/W.PET/CERES
100 CREAM (GRAM) TOPICAL DAILY
COMMUNITY

## 2024-10-21 ENCOUNTER — NURSE TRIAGE (OUTPATIENT)
Dept: ADMINISTRATIVE | Facility: CLINIC | Age: 64
End: 2024-10-21
Payer: COMMERCIAL

## 2024-10-21 NOTE — TELEPHONE ENCOUNTER
Spoke with  of, who connected pt on 3 way. Reports that pt has been having upper abdominal pain for over a week, reports nausea as well. States that pt has been taking zofran but has not be relieving nausea.Pt only eating 1 small meal a day. Advised to be seen in ED. Pt declined. Will send message to office. Verbalized understanding.      Reason for Disposition   SEVERE abdominal pain (e.g., excruciating)    Additional Information   Negative: SEVERE difficulty breathing (e.g., struggling for each breath, speaks in single words)   Negative: Shock suspected (e.g., cold/pale/clammy skin, too weak to stand, low BP, rapid pulse)   Negative: Difficult to awaken or acting confused (e.g., disoriented, slurred speech)   Negative: Passed out (e.g., fainted, lost consciousness, blacked out and was not responding)   Negative: Visible sweat on face or sweat is dripping down   Negative: Sounds like a life-threatening emergency to the triager    Protocols used: Abdominal Pain - Upper-A-OH     detailed exam

## 2024-10-21 NOTE — TELEPHONE ENCOUNTER
Called Pt and she is going to emergency room later today in regards to her abdominal pain after she find someone to bring her.    -Check with your Pediatrician before giving any medications to your baby.

## 2024-10-22 ENCOUNTER — HOSPITAL ENCOUNTER (EMERGENCY)
Facility: HOSPITAL | Age: 64
Discharge: HOME | End: 2024-10-22
Attending: STUDENT IN AN ORGANIZED HEALTH CARE EDUCATION/TRAINING PROGRAM
Payer: COMMERCIAL

## 2024-10-22 VITALS
DIASTOLIC BLOOD PRESSURE: 67 MMHG | OXYGEN SATURATION: 90 % | WEIGHT: 184.94 LBS | SYSTOLIC BLOOD PRESSURE: 150 MMHG | BODY MASS INDEX: 29.72 KG/M2 | RESPIRATION RATE: 16 BRPM | HEART RATE: 70 BPM | TEMPERATURE: 98 F | HEIGHT: 66 IN

## 2024-10-22 DIAGNOSIS — R07.9 CHEST PAIN: ICD-10-CM

## 2024-10-22 DIAGNOSIS — R10.9 ABDOMINAL PAIN: Primary | ICD-10-CM

## 2024-10-22 LAB
ALBUMIN SERPL BCP-MCNC: 4 G/DL (ref 3.5–5.2)
ALP SERPL-CCNC: 42 U/L (ref 40–150)
ALT SERPL W/O P-5'-P-CCNC: 14 U/L (ref 10–44)
ANION GAP SERPL CALC-SCNC: 9 MMOL/L (ref 8–16)
ANISOCYTOSIS BLD QL SMEAR: SLIGHT
AST SERPL-CCNC: 16 U/L (ref 10–40)
BACTERIA #/AREA URNS AUTO: ABNORMAL /HPF
BASOPHILS # BLD AUTO: ABNORMAL K/UL (ref 0–0.2)
BASOPHILS NFR BLD: 0 % (ref 0–1.9)
BILIRUB SERPL-MCNC: 0.7 MG/DL (ref 0.1–1)
BILIRUB UR QL STRIP: NEGATIVE
BNP SERPL-MCNC: 23 PG/ML (ref 0–99)
BUN SERPL-MCNC: 13 MG/DL (ref 8–23)
BUN SERPL-MCNC: 14 MG/DL (ref 6–30)
CALCIUM SERPL-MCNC: 8.9 MG/DL (ref 8.7–10.5)
CHLORIDE SERPL-SCNC: 100 MMOL/L (ref 95–110)
CHLORIDE SERPL-SCNC: 104 MMOL/L (ref 95–110)
CLARITY UR REFRACT.AUTO: CLEAR
CO2 SERPL-SCNC: 24 MMOL/L (ref 23–29)
COLOR UR AUTO: YELLOW
CREAT SERPL-MCNC: 0.9 MG/DL (ref 0.5–1.4)
CREAT SERPL-MCNC: 1.1 MG/DL (ref 0.5–1.4)
DIFFERENTIAL METHOD BLD: ABNORMAL
EOSINOPHIL # BLD AUTO: ABNORMAL K/UL (ref 0–0.5)
EOSINOPHIL NFR BLD: 0 % (ref 0–8)
ERYTHROCYTE [DISTWIDTH] IN BLOOD BY AUTOMATED COUNT: 16.9 % (ref 11.5–14.5)
EST. GFR  (NO RACE VARIABLE): >60 ML/MIN/1.73 M^2
GLUCOSE SERPL-MCNC: 92 MG/DL (ref 70–110)
GLUCOSE SERPL-MCNC: 92 MG/DL (ref 70–110)
GLUCOSE UR QL STRIP: NEGATIVE
HCT VFR BLD AUTO: 35.1 % (ref 37–48.5)
HCT VFR BLD CALC: 38 %PCV (ref 36–54)
HGB BLD-MCNC: 10.7 G/DL (ref 12–16)
HGB UR QL STRIP: NEGATIVE
HYPOCHROMIA BLD QL SMEAR: ABNORMAL
IMM GRANULOCYTES # BLD AUTO: ABNORMAL K/UL (ref 0–0.04)
IMM GRANULOCYTES NFR BLD AUTO: ABNORMAL % (ref 0–0.5)
KETONES UR QL STRIP: ABNORMAL
LACTATE SERPL-SCNC: 0.9 MMOL/L (ref 0.5–2.2)
LEUKOCYTE ESTERASE UR QL STRIP: ABNORMAL
LIPASE SERPL-CCNC: 17 U/L (ref 4–60)
LYMPHOCYTES # BLD AUTO: ABNORMAL K/UL (ref 1–4.8)
LYMPHOCYTES NFR BLD: 77 % (ref 18–48)
MCH RBC QN AUTO: 24.6 PG (ref 27–31)
MCHC RBC AUTO-ENTMCNC: 30.5 G/DL (ref 32–36)
MCV RBC AUTO: 81 FL (ref 82–98)
MICROSCOPIC COMMENT: ABNORMAL
MONOCYTES # BLD AUTO: ABNORMAL K/UL (ref 0.3–1)
MONOCYTES NFR BLD: 2 % (ref 4–15)
NEUTROPHILS NFR BLD: 21 % (ref 38–73)
NITRITE UR QL STRIP: NEGATIVE
NRBC BLD-RTO: 0 /100 WBC
OHS QRS DURATION: 84 MS
OHS QTC CALCULATION: 435 MS
OVALOCYTES BLD QL SMEAR: ABNORMAL
PH UR STRIP: 6 [PH] (ref 5–8)
PLATELET # BLD AUTO: 100 K/UL (ref 150–450)
PMV BLD AUTO: ABNORMAL FL (ref 9.2–12.9)
POC IONIZED CALCIUM: 1.15 MMOL/L (ref 1.06–1.42)
POC TCO2 (MEASURED): 26 MMOL/L (ref 23–29)
POIKILOCYTOSIS BLD QL SMEAR: SLIGHT
POLYCHROMASIA BLD QL SMEAR: ABNORMAL
POTASSIUM BLD-SCNC: 4.3 MMOL/L (ref 3.5–5.1)
POTASSIUM SERPL-SCNC: 4.1 MMOL/L (ref 3.5–5.1)
PROT SERPL-MCNC: 6.5 G/DL (ref 6–8.4)
PROT UR QL STRIP: NEGATIVE
RBC # BLD AUTO: 4.35 M/UL (ref 4–5.4)
RBC #/AREA URNS AUTO: 3 /HPF (ref 0–4)
SAMPLE: NORMAL
SODIUM BLD-SCNC: 136 MMOL/L (ref 136–145)
SODIUM SERPL-SCNC: 137 MMOL/L (ref 136–145)
SP GR UR STRIP: 1.01 (ref 1–1.03)
SPHEROCYTES BLD QL SMEAR: ABNORMAL
SQUAMOUS #/AREA URNS AUTO: 3 /HPF
TROPONIN I SERPL DL<=0.01 NG/ML-MCNC: 0.02 NG/ML (ref 0–0.03)
URN SPEC COLLECT METH UR: ABNORMAL
WBC # BLD AUTO: 10 K/UL (ref 3.9–12.7)
WBC #/AREA URNS AUTO: 9 /HPF (ref 0–5)

## 2024-10-22 PROCEDURE — 96374 THER/PROPH/DIAG INJ IV PUSH: CPT

## 2024-10-22 PROCEDURE — 96375 TX/PRO/DX INJ NEW DRUG ADDON: CPT

## 2024-10-22 PROCEDURE — 99285 EMERGENCY DEPT VISIT HI MDM: CPT | Mod: 25

## 2024-10-22 PROCEDURE — 83690 ASSAY OF LIPASE: CPT

## 2024-10-22 PROCEDURE — 84484 ASSAY OF TROPONIN QUANT: CPT

## 2024-10-22 PROCEDURE — 63600175 PHARM REV CODE 636 W HCPCS

## 2024-10-22 PROCEDURE — 85007 BL SMEAR W/DIFF WBC COUNT: CPT

## 2024-10-22 PROCEDURE — 80047 BASIC METABLC PNL IONIZED CA: CPT

## 2024-10-22 PROCEDURE — 82330 ASSAY OF CALCIUM: CPT | Mod: 91

## 2024-10-22 PROCEDURE — 85027 COMPLETE CBC AUTOMATED: CPT

## 2024-10-22 PROCEDURE — 81001 URINALYSIS AUTO W/SCOPE: CPT

## 2024-10-22 PROCEDURE — 83605 ASSAY OF LACTIC ACID: CPT

## 2024-10-22 PROCEDURE — 93010 ELECTROCARDIOGRAM REPORT: CPT | Mod: ,,, | Performed by: INTERNAL MEDICINE

## 2024-10-22 PROCEDURE — 80053 COMPREHEN METABOLIC PANEL: CPT

## 2024-10-22 PROCEDURE — 83880 ASSAY OF NATRIURETIC PEPTIDE: CPT

## 2024-10-22 PROCEDURE — 93005 ELECTROCARDIOGRAM TRACING: CPT

## 2024-10-22 PROCEDURE — 25500020 PHARM REV CODE 255: Performed by: STUDENT IN AN ORGANIZED HEALTH CARE EDUCATION/TRAINING PROGRAM

## 2024-10-22 PROCEDURE — 63600175 PHARM REV CODE 636 W HCPCS: Performed by: STUDENT IN AN ORGANIZED HEALTH CARE EDUCATION/TRAINING PROGRAM

## 2024-10-22 RX ORDER — HYDROMORPHONE HYDROCHLORIDE 1 MG/ML
1 INJECTION, SOLUTION INTRAMUSCULAR; INTRAVENOUS; SUBCUTANEOUS
Status: COMPLETED | OUTPATIENT
Start: 2024-10-22 | End: 2024-10-22

## 2024-10-22 RX ORDER — ONDANSETRON HYDROCHLORIDE 2 MG/ML
4 INJECTION, SOLUTION INTRAVENOUS ONCE
Status: COMPLETED | OUTPATIENT
Start: 2024-10-22 | End: 2024-10-22

## 2024-10-22 RX ORDER — PROCHLORPERAZINE EDISYLATE 5 MG/ML
10 INJECTION INTRAMUSCULAR; INTRAVENOUS
Status: COMPLETED | OUTPATIENT
Start: 2024-10-22 | End: 2024-10-22

## 2024-10-22 RX ORDER — MORPHINE SULFATE 2 MG/ML
6 INJECTION, SOLUTION INTRAMUSCULAR; INTRAVENOUS
Status: COMPLETED | OUTPATIENT
Start: 2024-10-22 | End: 2024-10-22

## 2024-10-22 RX ORDER — ONDANSETRON 4 MG/1
4 TABLET, ORALLY DISINTEGRATING ORAL EVERY 4 HOURS PRN
Qty: 1 TABLET | Refills: 0 | Status: SHIPPED | OUTPATIENT
Start: 2024-10-22 | End: 2024-10-25

## 2024-10-22 RX ADMIN — IOHEXOL 75 ML: 350 INJECTION, SOLUTION INTRAVENOUS at 11:10

## 2024-10-22 RX ADMIN — HYDROMORPHONE HYDROCHLORIDE 1 MG: 1 INJECTION, SOLUTION INTRAMUSCULAR; INTRAVENOUS; SUBCUTANEOUS at 11:10

## 2024-10-22 RX ADMIN — MORPHINE SULFATE 6 MG: 2 INJECTION, SOLUTION INTRAMUSCULAR; INTRAVENOUS at 09:10

## 2024-10-22 RX ADMIN — ONDANSETRON 4 MG: 2 INJECTION INTRAMUSCULAR; INTRAVENOUS at 10:10

## 2024-10-22 RX ADMIN — PROCHLORPERAZINE EDISYLATE 10 MG: 5 INJECTION INTRAMUSCULAR; INTRAVENOUS at 09:10

## 2024-10-23 ENCOUNTER — PATIENT MESSAGE (OUTPATIENT)
Dept: INTERNAL MEDICINE | Facility: CLINIC | Age: 64
End: 2024-10-23
Payer: COMMERCIAL

## 2024-10-28 ENCOUNTER — TELEPHONE (OUTPATIENT)
Dept: GASTROENTEROLOGY | Facility: CLINIC | Age: 64
End: 2024-10-28
Payer: COMMERCIAL

## 2024-11-11 ENCOUNTER — TELEPHONE (OUTPATIENT)
Dept: ENDOSCOPY | Facility: HOSPITAL | Age: 64
End: 2024-11-11
Payer: COMMERCIAL

## 2024-11-11 ENCOUNTER — OFFICE VISIT (OUTPATIENT)
Dept: GASTROENTEROLOGY | Facility: CLINIC | Age: 64
End: 2024-11-11
Payer: COMMERCIAL

## 2024-11-11 VITALS — WEIGHT: 190 LBS | BODY MASS INDEX: 30.53 KG/M2 | HEIGHT: 66 IN

## 2024-11-11 VITALS
HEART RATE: 71 BPM | HEIGHT: 66 IN | DIASTOLIC BLOOD PRESSURE: 65 MMHG | BODY MASS INDEX: 32.56 KG/M2 | SYSTOLIC BLOOD PRESSURE: 130 MMHG | WEIGHT: 202.63 LBS

## 2024-11-11 DIAGNOSIS — R19.7 DIARRHEA, UNSPECIFIED TYPE: Primary | ICD-10-CM

## 2024-11-11 DIAGNOSIS — Z12.11 SCREEN FOR COLON CANCER: Primary | ICD-10-CM

## 2024-11-11 DIAGNOSIS — Z86.0100 HX OF COLONIC POLYPS: ICD-10-CM

## 2024-11-11 PROCEDURE — 99214 OFFICE O/P EST MOD 30 MIN: CPT | Mod: S$GLB,,, | Performed by: STUDENT IN AN ORGANIZED HEALTH CARE EDUCATION/TRAINING PROGRAM

## 2024-11-11 PROCEDURE — 3044F HG A1C LEVEL LT 7.0%: CPT | Mod: CPTII,S$GLB,, | Performed by: STUDENT IN AN ORGANIZED HEALTH CARE EDUCATION/TRAINING PROGRAM

## 2024-11-11 PROCEDURE — 3288F FALL RISK ASSESSMENT DOCD: CPT | Mod: CPTII,S$GLB,, | Performed by: STUDENT IN AN ORGANIZED HEALTH CARE EDUCATION/TRAINING PROGRAM

## 2024-11-11 PROCEDURE — 1125F AMNT PAIN NOTED PAIN PRSNT: CPT | Mod: CPTII,S$GLB,, | Performed by: STUDENT IN AN ORGANIZED HEALTH CARE EDUCATION/TRAINING PROGRAM

## 2024-11-11 PROCEDURE — 1101F PT FALLS ASSESS-DOCD LE1/YR: CPT | Mod: CPTII,S$GLB,, | Performed by: STUDENT IN AN ORGANIZED HEALTH CARE EDUCATION/TRAINING PROGRAM

## 2024-11-11 PROCEDURE — 3075F SYST BP GE 130 - 139MM HG: CPT | Mod: CPTII,S$GLB,, | Performed by: STUDENT IN AN ORGANIZED HEALTH CARE EDUCATION/TRAINING PROGRAM

## 2024-11-11 PROCEDURE — 4010F ACE/ARB THERAPY RXD/TAKEN: CPT | Mod: CPTII,S$GLB,, | Performed by: STUDENT IN AN ORGANIZED HEALTH CARE EDUCATION/TRAINING PROGRAM

## 2024-11-11 PROCEDURE — 3078F DIAST BP <80 MM HG: CPT | Mod: CPTII,S$GLB,, | Performed by: STUDENT IN AN ORGANIZED HEALTH CARE EDUCATION/TRAINING PROGRAM

## 2024-11-11 PROCEDURE — 99999 PR PBB SHADOW E&M-EST. PATIENT-LVL IV: CPT | Mod: PBBFAC,,, | Performed by: STUDENT IN AN ORGANIZED HEALTH CARE EDUCATION/TRAINING PROGRAM

## 2024-11-11 PROCEDURE — 3008F BODY MASS INDEX DOCD: CPT | Mod: CPTII,S$GLB,, | Performed by: STUDENT IN AN ORGANIZED HEALTH CARE EDUCATION/TRAINING PROGRAM

## 2024-11-11 PROCEDURE — 1159F MED LIST DOCD IN RCRD: CPT | Mod: CPTII,S$GLB,, | Performed by: STUDENT IN AN ORGANIZED HEALTH CARE EDUCATION/TRAINING PROGRAM

## 2024-11-11 RX ORDER — SOD SULF/POT CHLORIDE/MAG SULF 1.479 G
12 TABLET ORAL DAILY
Qty: 24 TABLET | Refills: 0 | Status: SHIPPED | OUTPATIENT
Start: 2024-11-11

## 2024-11-11 RX ORDER — CLONAZEPAM 0.5 MG/1
0.25 TABLET ORAL NIGHTLY
COMMUNITY
Start: 2024-10-28

## 2024-11-11 NOTE — PATIENT INSTRUCTIONS
Submit stool studies  Schedule scopes  Blood work today  Stop smoking  Stop Aleve. Use Tylenol instead - 1 tablet every 4-6 hours as needed.   NO alcohol

## 2024-11-11 NOTE — PROGRESS NOTES
"   [facility]    Reason for visit: The encounter diagnosis was Diarrhea, unspecified type.  Referring Provider/PCP: Andrew Rodriguez MD    History of Present Illness:  Earl Abdul is a 64 y.o. female with a history of HTN, hypothyroidism, CLL, vertigo, alcohol related pancreatitis, C diff infection, and DM2 who is presenting for follow-up evaluation of diarrhea.     She saw Dr. Acevedo for diarrhea & fecal incontinence in April 2022. Labs including testing for celiac disease were normal.  She had worsening right upper quadrant abdominal pain, nausea, vomiting and diarrhea.      Visited the ER on 10/30/2024.  Labs WNL, including CBC, CMP, Lipase. CT abdomen pelvis with contrast showed no acute process in the abdomen or pelvis; widespread lymphadenopathy throughout the pelvis and splenomegaly consistent with a history of CLL. Her symptoms were attributed to viral enteritis and she was discharged home with PO Zofran.     Today, she reports having diarrhea 3 to 4 times day, Butler stool scale 6-7, nonbloody, and variable volume.  This is accompanied by episodes of fecal incontinence, for which she has to wear adult diapers.  Her diarrhea began 6 weeks ago but she can not recollect any inciting factors (international travel, sick contacts, or raw seafood).    The patient's smokes 5-6 cigarettes daily.  Reports last drink of alcohol was over 2 months ago.    PEndoHx:  Colonoscopy from 2020 reviewed.     Family Hx:  Patient's mother was diagnosed with Crohn's disease in her 50s.  She reportedly required 4 bowel surgeries and had an ostomy at 1 point.  Patient states that 16" of small bowel was removed.    Review of Systems   Constitutional:  Negative for chills, fever and weight loss.   Eyes:  Negative for pain and redness.   Cardiovascular:  Negative for chest pain and leg swelling.   Gastrointestinal:  Positive for abdominal pain and diarrhea. Negative for blood in stool and melena. "   Musculoskeletal:  Positive for joint pain.   Skin:  Negative for itching and rash.       Physical Exam:  Constitutional: obese,  alert,  not in acute distress, and well developed  HENT: Head: Normal, normocephalic, atraumatic.  Eyes: conjunctiva clear and sclera nonicteric  GI: soft, non-tender, without masses or organomegaly, nondistended, without guarding, and without rebound  Skin: normal color and no jaundice  Neurological: alert, oriented x3  speech normal in context and clarity  memory intact grossly  Psychiatric: mood and affect are within normal limits, pt is a good historian; no memory problems were noted    Laboratory:  Lab Results   Component Value Date     10/22/2024    K 4.1 10/22/2024     10/22/2024    CO2 24 10/22/2024    BUN 13 10/22/2024    CREATININE 0.9 10/22/2024    CALCIUM 8.9 10/22/2024    ANIONGAP 9 10/22/2024    ESTGFRAFRICA >60.0 06/22/2022    EGFRNONAA >60.0 06/22/2022       Lab Results   Component Value Date    ALT 14 10/22/2024    AST 16 10/22/2024     (H) 04/01/2011    ALKPHOS 42 10/22/2024    BILITOT 0.7 10/22/2024    ALBUMIN 4.0 10/22/2024    LIPASE 17 10/22/2024       Lab Results   Component Value Date    WBC 10.00 10/22/2024    HGB 10.7 (L) 10/22/2024    HCT 38 10/22/2024    MCV 81 (L) 10/22/2024     (L) 10/22/2024       Microbiology:  - C diff infection in 2014    Imaging:  - reviewed pertinent imaging the last 6 months    Assessment:  Earl Abdul is a 64 y.o. female who is presenting for follow-up evaluation of subacute diarrhea & abdominal pain. Recent ER visit revealed normal labs & no acute changes seen on imaging. Alarm symptoms include large volume nocturnal diarrhea. Risk factors for microscopic colitis include smoking & NSAID use.       Problems:  Subacute diarrhea - differentials include infection, microscopic colitis, inflammation, exocrine pancreatic insufficiency  Remote history alcohol-related pancreatitis  Remote history of C diff  infection  Polypharmacy      Plan:  Plan for EGD and colonoscopy.  Request endoscopist to intubate TI and take biopsies of stomach/duodenum/colon.  We will check stool studies to rule out infection, inflammation, and exocrine pancreatic insufficiency.  If stool studies are negative for infection, we will reach out to the patient to prescribe Imodium.  Instructed the patient to quit smoking, drinking alcohol, and avoid all NSAIDs.  Follow up in about 3 months (around 2/11/2025).    Nima Johnson MD  Gastroenterology Fellow    Orders Placed This Encounter   Procedures    Clostridium difficile EIA    CULTURE, STOOL    Giardia / Cryptosporidum, EIA    Calprotectin, Stool    Pancreatic elastase, fecal    Phosphatidylethanol (PETH)

## 2024-11-11 NOTE — TELEPHONE ENCOUNTER
Referral for procedure from [facility]      Spoke to Earl to schedule procedure(s) Colonoscopy/EGD       Physician to perform procedure(s) Dr. ELPIDIO Sampson  Date of Procedure (s) 11/18/24  Arrival Time 1:15 PM  Time of Procedure(s) 2:15 PM   Location of Procedure(s) [place] 2nd Floor  Type of Rx Prep sent to patient: Sutab  Instructions provided to patient via [facility]    Patient was informed on the following information and verbalized understanding. Screening questionnaire reviewed with patient and complete. If procedure requires anesthesia, a responsible adult needs to be present to accompany the patient home, patient cannot drive after receiving anesthesia. Appointment details are tentative, especially check-in time. Patient will receive a prep-op call 7 days prior to confirm check-in time for procedure. If applicable the patient should contact their pharmacy to verify Rx for procedure prep is ready for pick-up. Patient was advised to call the scheduling department at [phone] if pharmacy states no Rx is available. Patient was advised to call the endoscopy scheduling department if any questions or concerns arise.       Endoscopy Scheduling Department

## 2024-11-11 NOTE — TELEPHONE ENCOUNTER
"----- Message from Nima Johnson sent at 2024  3:23 PM CST -----  Regarding: EGD + colon  Procedure: EGD/Colonoscopy    Diagnosis: Surveillance colonoscopy - Hx of colon polyps and Diarrhea    Procedure Timin-12 weeks    #If within 4 weeks selected, please martín as high priority#    #If greater than 12 weeks, please select "5-12 weeks" and delay sending until 3 months prior to requested date#     Location: Hospital Based ([address], [facility], [facility])    Additional Scheduling Information: Advanced cardiac or pulmonary disease (Emphysema)    Prep Specifications:Standard prep    Is the patient taking a GLP-1 Agonist:no    Have you attached a patient to this message: yes  "

## 2024-11-12 ENCOUNTER — TELEPHONE (OUTPATIENT)
Dept: ENDOSCOPY | Facility: HOSPITAL | Age: 64
End: 2024-11-12
Payer: COMMERCIAL

## 2024-11-12 ENCOUNTER — PATIENT MESSAGE (OUTPATIENT)
Dept: INTERNAL MEDICINE | Facility: CLINIC | Age: 64
End: 2024-11-12
Payer: COMMERCIAL

## 2024-11-12 ENCOUNTER — LAB VISIT (OUTPATIENT)
Dept: LAB | Facility: HOSPITAL | Age: 64
End: 2024-11-12
Payer: COMMERCIAL

## 2024-11-12 ENCOUNTER — TELEPHONE (OUTPATIENT)
Dept: GASTROENTEROLOGY | Facility: CLINIC | Age: 64
End: 2024-11-12
Payer: COMMERCIAL

## 2024-11-12 DIAGNOSIS — R19.7 DIARRHEA, UNSPECIFIED TYPE: ICD-10-CM

## 2024-11-12 LAB
C DIFF GDH STL QL: POSITIVE
C DIFF TOX A+B STL QL IA: NEGATIVE
C DIFF TOX GENS STL QL NAA+PROBE: POSITIVE

## 2024-11-12 PROCEDURE — 82653 EL-1 FECAL QUANTITATIVE: CPT | Performed by: STUDENT IN AN ORGANIZED HEALTH CARE EDUCATION/TRAINING PROGRAM

## 2024-11-12 PROCEDURE — 83993 ASSAY FOR CALPROTECTIN FECAL: CPT | Performed by: STUDENT IN AN ORGANIZED HEALTH CARE EDUCATION/TRAINING PROGRAM

## 2024-11-12 PROCEDURE — 87045 FECES CULTURE AEROBIC BACT: CPT | Performed by: STUDENT IN AN ORGANIZED HEALTH CARE EDUCATION/TRAINING PROGRAM

## 2024-11-12 PROCEDURE — 87427 SHIGA-LIKE TOXIN AG IA: CPT | Mod: 59 | Performed by: STUDENT IN AN ORGANIZED HEALTH CARE EDUCATION/TRAINING PROGRAM

## 2024-11-12 PROCEDURE — 87324 CLOSTRIDIUM AG IA: CPT | Performed by: STUDENT IN AN ORGANIZED HEALTH CARE EDUCATION/TRAINING PROGRAM

## 2024-11-12 PROCEDURE — 87493 C DIFF AMPLIFIED PROBE: CPT | Performed by: STUDENT IN AN ORGANIZED HEALTH CARE EDUCATION/TRAINING PROGRAM

## 2024-11-12 PROCEDURE — 87449 NOS EACH ORGANISM AG IA: CPT | Mod: 91 | Performed by: STUDENT IN AN ORGANIZED HEALTH CARE EDUCATION/TRAINING PROGRAM

## 2024-11-12 PROCEDURE — 87329 GIARDIA AG IA: CPT | Performed by: STUDENT IN AN ORGANIZED HEALTH CARE EDUCATION/TRAINING PROGRAM

## 2024-11-12 PROCEDURE — 87046 STOOL CULTR AEROBIC BACT EA: CPT | Performed by: STUDENT IN AN ORGANIZED HEALTH CARE EDUCATION/TRAINING PROGRAM

## 2024-11-12 NOTE — TELEPHONE ENCOUNTER
Dear Dr. Hargrove and Dr. Olsen,    Patient has a scheduled procedure Colonoscopy/EGD on 11/18/24 and is currently taking a blood thinner. In order to ensure patient safety, we would like to confirm that the patient can place their blood thinner medication on hold for the procedure. Can he/she discontinue Eliquis (apixaban) for a minimum of 2 days prior to the procedure?     Thank you for your prompt reply.    [facility] Endoscopy Scheduling

## 2024-11-12 NOTE — TELEPHONE ENCOUNTER
----- Message from Nima Johnson sent at 11/12/2024 10:49 AM CST -----  Regarding: Stool studies  Dear Ms. Louise,     May you please call her to remind her to submit stool studies before she begins prep for her colonoscopy?    Thanks

## 2024-11-12 NOTE — TELEPHONE ENCOUNTER
Dear Dr. Rodriguez,     Patient has a scheduled procedure Colonoscopy/EGD on 11/18/24 and is currently taking a blood thinner. In order to ensure patient safety, we would like to confirm that the patient can place their blood thinner medication on hold for the procedure. Can he/she discontinue Eliquis (apixaban) for a minimum of 2 days prior to the procedure?     Thank you for your prompt reply.     [facility] Endoscopy Scheduling

## 2024-11-12 NOTE — TELEPHONE ENCOUNTER
Rolf Martinez MA  You10 minutes ago (11:41 AM)     Andrew Millan MD19 minutes ago (11:20 AM)      OK to hold OAC for 48 hours prior to colonoscopy      Andrew Rodriguez MD routed conversation to Rolf Martinez MA32 minutes ago (11:20 AM)     Andrew Rodriguez MD32 minutes ago (11:20 AM)       OK to hold OAC for 48 hours prior to colonoscopy          Note      Rolf Martinez MA routed conversation to Andrew Rodriguez MD35 minutes ago (11:17 AM)     Rolf Martinez MA35 minutes ago (11:17 AM)     KS  Patient is scheduled for a colonoscopy on 11/18/24. The office is asking if the patient can stop blood thinners for 2 days to have the procedure         Note      You routed conversation to Andrew Rodriguez MD; Michael Lynch44 minutes ago (11:08 AM)     You44 minutes ago (11:08 AM)       Dear Dr. Rodriguez,     Patient has a scheduled procedure Colonoscopy/EGD on 11/18/24 and is currently taking a blood thinner. In order to ensure patient safety, we would like to confirm that the patient can place their blood thinner medication on hold for the procedure. Can he/she discontinue Eliquis (apixaban) for a minimum of 2 days prior to the procedure?     Thank you for your prompt reply.     [facility] Endoscopy Scheduling

## 2024-11-12 NOTE — TELEPHONE ENCOUNTER
Message left for patient.  If she has any questions regarding my message to please call me back.   [place]

## 2024-11-12 NOTE — TELEPHONE ENCOUNTER
Patient is scheduled for a colonoscopy on 11/18/24. The office is asking if the patient can stop blood thinners for 2 days to have the procedure

## 2024-11-13 ENCOUNTER — TELEPHONE (OUTPATIENT)
Dept: GASTROENTEROLOGY | Facility: CLINIC | Age: 64
End: 2024-11-13
Payer: COMMERCIAL

## 2024-11-13 ENCOUNTER — TELEPHONE (OUTPATIENT)
Dept: INTERNAL MEDICINE | Facility: CLINIC | Age: 64
End: 2024-11-13
Payer: COMMERCIAL

## 2024-11-13 ENCOUNTER — TELEPHONE (OUTPATIENT)
Dept: ENDOSCOPY | Facility: HOSPITAL | Age: 64
End: 2024-11-13
Payer: COMMERCIAL

## 2024-11-13 DIAGNOSIS — A04.71 RECURRENT CLOSTRIDIOIDES DIFFICILE DIARRHEA: Primary | ICD-10-CM

## 2024-11-13 LAB
CRYPTOSP AG STL QL IA: NEGATIVE
E COLI SXT1 STL QL IA: NEGATIVE
E COLI SXT2 STL QL IA: NEGATIVE
G LAMBLIA AG STL QL IA: NEGATIVE

## 2024-11-13 RX ORDER — SOD SULF/POT CHLORIDE/MAG SULF 1.479 G
12 TABLET ORAL DAILY
Qty: 24 TABLET | Refills: 0 | Status: SHIPPED | OUTPATIENT
Start: 2024-11-13 | End: 2024-12-11

## 2024-11-13 RX ORDER — VANCOMYCIN HYDROCHLORIDE 125 MG/1
CAPSULE ORAL
Qty: 68 CAPSULE | Refills: 0 | Status: SHIPPED | OUTPATIENT
Start: 2024-11-13 | End: 2024-12-21

## 2024-11-13 NOTE — TELEPHONE ENCOUNTER
Called patient to inform her about her stool studies which show C diff infection. C diff toxin PCR +ve. Discussed treatment (including FMT) and patient opted for PO vancomycin taper.      In the interim, she still has her scopes scheduled for 11/18. Will reach out to schedulers to reschedule.     Nima Johnson MD  PGY-VI, GI & Hepatology

## 2024-11-13 NOTE — TELEPHONE ENCOUNTER
----- Message from Sarah sent at 11/13/2024 12:04 PM CST -----  Regarding: FW: C diff infection    ----- Message -----  From: Nima Johnson MD  Sent: 11/13/2024  11:47 AM CST  To: Leidy Sampson MD; #  Subject: C diff infection                                 Hello all,     Patient was due for E/Cscope with Dr. Sampson on Monday. Her stool studies came back positive for C diff. I called her and have prescribed vancomycin taper. Please postpone scope by 4 weeks - towards early Jan would work. She is agreeable.    Thanks

## 2024-11-13 NOTE — TELEPHONE ENCOUNTER
Called patient to reschedule EGD and colonoscopy. Patient did not answer the call. M with phone number provided for callback.

## 2024-11-13 NOTE — TELEPHONE ENCOUNTER
Spoke with patient and informed her that Dr. Rodriguez has slots open for her to be seen by him instead of seeing Dr. Kay patient rescheduled below and held 9:40 am slot for more time.    Appointment Information   Name: MARTY SALDAÑA MRN: [id number]   Date: 11/14/2024 Status: [facility]   Appt Time: 10:40 AM Length: 20   Visit Type: EP - PRIMARY CARE ([facility]) [486] Copay: $0.00   Provider: Andrew Rodriguez MD Dept: [facility], Internal Medicine       Dept Address: [address] [place] [id number]       Dept Phone Number: [phone]   Referring Provider:   NEENA: [id number]   Appt Phone:     Notes: dizziness, restless leg syndrome   Rescheduled From: Thu Nov 14, 2024 1140       Rescheduled: 11/13/2024 12:34 PM By: ERIN LAGUNA [[id number]] (ES)

## 2024-11-14 ENCOUNTER — PATIENT MESSAGE (OUTPATIENT)
Dept: INTERNAL MEDICINE | Facility: CLINIC | Age: 64
End: 2024-11-14

## 2024-11-14 ENCOUNTER — OFFICE VISIT (OUTPATIENT)
Dept: INTERNAL MEDICINE | Facility: CLINIC | Age: 64
End: 2024-11-14
Attending: INTERNAL MEDICINE
Payer: COMMERCIAL

## 2024-11-14 ENCOUNTER — TELEPHONE (OUTPATIENT)
Dept: ENDOSCOPY | Facility: HOSPITAL | Age: 64
End: 2024-11-14
Payer: COMMERCIAL

## 2024-11-14 VITALS
HEIGHT: 66 IN | OXYGEN SATURATION: 93 % | BODY MASS INDEX: 32.34 KG/M2 | DIASTOLIC BLOOD PRESSURE: 68 MMHG | SYSTOLIC BLOOD PRESSURE: 100 MMHG | WEIGHT: 201.25 LBS | HEART RATE: 73 BPM

## 2024-11-14 DIAGNOSIS — F03.90 DEMENTIA, UNSPECIFIED DEMENTIA SEVERITY, UNSPECIFIED DEMENTIA TYPE, UNSPECIFIED WHETHER BEHAVIORAL, PSYCHOTIC, OR MOOD DISTURBANCE OR ANXIETY: ICD-10-CM

## 2024-11-14 DIAGNOSIS — R42 DIZZINESS: ICD-10-CM

## 2024-11-14 DIAGNOSIS — R71.8 MICROCYTOSIS: Primary | ICD-10-CM

## 2024-11-14 DIAGNOSIS — G47.33 OSA (OBSTRUCTIVE SLEEP APNEA): ICD-10-CM

## 2024-11-14 DIAGNOSIS — I10 ESSENTIAL HYPERTENSION: ICD-10-CM

## 2024-11-14 DIAGNOSIS — F10.20 CHRONIC ALCOHOL DEPENDENCE, CONTINUOUS: ICD-10-CM

## 2024-11-14 DIAGNOSIS — E03.9 HYPOTHYROIDISM, UNSPECIFIED TYPE: ICD-10-CM

## 2024-11-14 PROCEDURE — 3008F BODY MASS INDEX DOCD: CPT | Mod: CPTII,S$GLB,, | Performed by: INTERNAL MEDICINE

## 2024-11-14 PROCEDURE — 3044F HG A1C LEVEL LT 7.0%: CPT | Mod: CPTII,S$GLB,, | Performed by: INTERNAL MEDICINE

## 2024-11-14 PROCEDURE — 4010F ACE/ARB THERAPY RXD/TAKEN: CPT | Mod: CPTII,S$GLB,, | Performed by: INTERNAL MEDICINE

## 2024-11-14 PROCEDURE — 3074F SYST BP LT 130 MM HG: CPT | Mod: CPTII,S$GLB,, | Performed by: INTERNAL MEDICINE

## 2024-11-14 PROCEDURE — G2211 COMPLEX E/M VISIT ADD ON: HCPCS | Mod: S$GLB,,, | Performed by: INTERNAL MEDICINE

## 2024-11-14 PROCEDURE — 3288F FALL RISK ASSESSMENT DOCD: CPT | Mod: CPTII,S$GLB,, | Performed by: INTERNAL MEDICINE

## 2024-11-14 PROCEDURE — 99999 PR PBB SHADOW E&M-EST. PATIENT-LVL V: CPT | Mod: PBBFAC,,, | Performed by: INTERNAL MEDICINE

## 2024-11-14 PROCEDURE — 99214 OFFICE O/P EST MOD 30 MIN: CPT | Mod: S$GLB,,, | Performed by: INTERNAL MEDICINE

## 2024-11-14 PROCEDURE — 3078F DIAST BP <80 MM HG: CPT | Mod: CPTII,S$GLB,, | Performed by: INTERNAL MEDICINE

## 2024-11-14 PROCEDURE — 1101F PT FALLS ASSESS-DOCD LE1/YR: CPT | Mod: CPTII,S$GLB,, | Performed by: INTERNAL MEDICINE

## 2024-11-14 PROCEDURE — 1160F RVW MEDS BY RX/DR IN RCRD: CPT | Mod: CPTII,S$GLB,, | Performed by: INTERNAL MEDICINE

## 2024-11-14 PROCEDURE — 1159F MED LIST DOCD IN RCRD: CPT | Mod: CPTII,S$GLB,, | Performed by: INTERNAL MEDICINE

## 2024-11-14 PROCEDURE — 1125F AMNT PAIN NOTED PAIN PRSNT: CPT | Mod: CPTII,S$GLB,, | Performed by: INTERNAL MEDICINE

## 2024-11-14 RX ORDER — ONDANSETRON 4 MG/1
4 TABLET, FILM COATED ORAL DAILY PRN
Qty: 30 TABLET | Refills: 0 | Status: SHIPPED | OUTPATIENT
Start: 2024-11-14

## 2024-11-14 RX ORDER — HYDROXYZINE PAMOATE 50 MG/1
50 CAPSULE ORAL DAILY PRN
COMMUNITY
Start: 2024-11-12

## 2024-11-14 NOTE — TELEPHONE ENCOUNTER
Called patient. Patient did not answer the call. Left message that Dr. Johnson wants to postpone her procedures. Phone number provided for callback.

## 2024-11-14 NOTE — TELEPHONE ENCOUNTER
Referral for procedure from [facility]      Spoke to patient to reschedule procedure(s) Colonoscopy/EGD       Physician to perform procedure(s) Dr. ELPIDIO Valente  Date of Procedure (s) 1/9/25  Arrival Time 12:30 PM  Time of Procedure(s) 1:30 PM   Location of Procedure(s) [address]  Type of Rx Prep sent to patient: Sutab  Instructions provided to patient via [facility]    Patient was informed on the following information and verbalized understanding. Screening questionnaire reviewed with patient and complete. If procedure requires anesthesia, a responsible adult needs to be present to accompany the patient home, patient cannot drive after receiving anesthesia. Appointment details are tentative, especially check-in time. Patient will receive a prep-op call 7 days prior to confirm check-in time for procedure. If applicable the patient should contact their pharmacy to verify Rx for procedure prep is ready for pick-up. Patient was advised to call the scheduling department at [phone] if pharmacy states no Rx is available. Patient was advised to call the endoscopy scheduling department if any questions or concerns arise.       Endoscopy Scheduling Department

## 2024-11-14 NOTE — PROGRESS NOTES
Subjective:       Patient ID: Earl Abdul is a 64 y.o. female.    Chief Complaint: Dizziness, rest leg syndrome , GI Problem, and Follow-up (F/u on sleep medication. Alternative needed. )    Here for     2 week hx of abdominal pain and nausea, loose BM, approx 5 daily.     History of Present Illness    CHIEF COMPLAINT:  Earl presents with worsening abdominal symptoms, including nausea, loose bowels, and pain, as well as leg discomfort and restlessness.    HPI:  Earl reports abdominal symptoms that began approximately 2 weeks ago and have progressively worsened, with the past weekend being particularly severe. She currently has pain, discomfort, and persistent nausea. Earl has loose bowels with approximately 5 bowel movements per day. She feels very weak, lacks energy, and has a diminished appetite due to the nausea.    Earl describes leg pain and restlessness from the knee down, particularly troublesome at night when trying to sleep. She reports involuntary leg movements when lying down.    Earl has been diagnosed with C. diff (Clostridium difficile) infection and has recently started treatment. Due to the highly contagious nature of C. diff, the patient has been advised to take precautions, including using a separate bathroom from her  and potentially avoiding sharing a bed. Earl is concerned about the duration of these precautions, stating she was informed it could last about 4 weeks.    Earl uses oxygen at night for sleep apnea but is unclear about whether she uses a CPAP machine. She expresses a strong desire for sleep medication, mentioning that she is currently taking Seroquel to help with sleep.    MEDICATIONS:  Earl is on Seroquel and Trazodone for sleep. She uses oxygen via machine at night for sleep apnea. She recently started an antibiotic treatment for C. diff infection.    MEDICAL HISTORY:  Earl has a history of C. diff infection, sleep apnea, and restless leg syndrome.    TEST  "RESULTS:  Earl completed a sleep study prior to the conversation. The results indicated a diagnosis of sleep apnea, leading to the prescription of oxygen therapy.      ROS:  General: -fever, -chills, +fatigue, -weight gain, -weight loss, +loss of appetite  Eyes: -vision changes, -redness, -discharge  ENT: -ear pain, -nasal congestion, -sore throat  Cardiovascular: -chest pain, -palpitations, -lower extremity edema  Respiratory: -cough, -shortness of breath  Gastrointestinal: +abdominal pain, +nausea, -vomiting, +diarrhea, -constipation, -blood in stool  Genitourinary: -dysuria, -hematuria, -frequency  Musculoskeletal: -joint pain, -muscle pain  Skin: -rash, -lesion  Neurological: -headache, -dizziness, -numbness, -tingling  Psychiatric: -anxiety, -depression, +sleep difficulty         Review of Systems    Objective:      Vitals:    11/14/24 1054   BP: 100/68   BP Location: Left arm   Patient Position: Sitting   Pulse: 73   SpO2: (!) 93%   Weight: 91.3 kg (201 lb 4.5 oz)   Height: 5' 6" (1.676 m)      Physical Exam    Assessment:       1. Microcytosis    2. Dizziness    3. Chronic alcohol dependence, continuous    4. Dementia, unspecified dementia severity, unspecified dementia type, unspecified whether behavioral, psychotic, or mood disturbance or anxiety    5. Hypothyroidism, unspecified type    6. Essential hypertension    7. VINICIO (obstructive sleep apnea)        Plan:       Earl was seen today for dizziness, rest leg syndrome , gi problem and follow-up.    Diagnoses and all orders for this visit:  Expressed to patient again with her history of polypharmacy contributing to syncopal episodes and subsequent intracranial hemorrhaging I do not feel that the risk/benefit excessive nighttime sedation is warranted.  We have had this discussion before.  Patient became agitated and was quite upset that I was uncomfortable with the approach of increasing sedation.  I have ultimately recommended she stop her trazodone and " increase her nighttime Seroquel dose by doubling.  Recommend she follow-up with neurology, sleep, to discuss insomnia. restless leg and sleep.    Microcytosis  -     CBC Auto Differential; Future  -     Reticulocytes; Future  -     Vitamin B12; Future  -     Ferritin; Future  -     Iron and TIBC; Future    Dizziness    Chronic alcohol dependence, continuous    Dementia, unspecified dementia severity, unspecified dementia type, unspecified whether behavioral, psychotic, or mood disturbance or anxiety   Caregiver present    Hypothyroidism, unspecified type    Essential hypertension    VINICIO (obstructive sleep apnea)  -     Ambulatory referral/consult to Sleep Disorders; Future    Other orders  -     ondansetron (ZOFRAN) 4 MG tablet; Take 1 tablet (4 mg total) by mouth daily as needed for Nausea.       Visit today is associated with current or anticipated ongoing medical care related to this patient's single serious condition/complex condition of dementia. The patient will return to see me as these issues will be followed longitudinally.      Assessment & Plan    CLOSTRIDIUM DIFFICILE INFECTION:  Treating patient for C. diff infection.  Explained C. diff is highly contagious, transmitted through fecal-oral route.  Discussed that C. diff spores can survive on surfaces for extended periods.  Educated that gut symptoms may persist for weeks to months even after infection is treated.  Earl to use separate bathroom from family members.  Earl to avoid kitchen and family areas.  Earl to practice diligent hand washing to reduce spread of C. diff.    SLEEP DISORDERS:  Considered sleep medication changes but deferred to sleep specialist, neurologist, and psychiatrist due to case complexity.  Increased Seroquel dose to 100mg and discontinued trazodone to address sleep issues.  Earl to continue using oxygen machine for sleep apnea.  Increased Seroquel to 100mg at bedtime.  Discontinued trazodone.  Referred to sleep specialist  for ongoing sleep issues.    NAUSEA AND VOMITING:  Refilled Zofran.    FALL RISK AND SUBDURAL HEMORRHAGE HISTORY:  Advised against prescribing muscle relaxers due to fall risk and history of subdural hemorrhages.         This note was generated with the assistance of ambient listening technology. Verbal consent was obtained by the patient and accompanying visitor(s) for the recording of patient appointment to facilitate this note. I attest to having reviewed and edited the generated note for accuracy, though some syntax or spelling errors may persist. Please contact the author of this note for any clarification.      Andrew Chase MD  Internal Medicine-[facility]        Side effects of medication(s) were discussed in detail and patient voiced understanding.  Patient will call back for any issues or complications.

## 2024-11-15 LAB
BACTERIA STL CULT: NORMAL
CALPROTECTIN STL-MCNT: 24.9 MCG/G
ELASTASE 1, FECAL: >500 MCG/G

## 2024-11-18 ENCOUNTER — PATIENT MESSAGE (OUTPATIENT)
Dept: GASTROENTEROLOGY | Facility: CLINIC | Age: 64
End: 2024-11-18
Payer: COMMERCIAL

## 2024-11-18 NOTE — PROGRESS NOTES
Earl, your other results look fine. How is your diarrhea doing with the C diff treatment?    Please contact me if you have any additional concerns.    Sincerely,    Nima Johnson

## 2024-11-21 ENCOUNTER — PATIENT MESSAGE (OUTPATIENT)
Dept: INTERNAL MEDICINE | Facility: CLINIC | Age: 64
End: 2024-11-21
Payer: COMMERCIAL

## 2024-11-22 ENCOUNTER — TELEPHONE (OUTPATIENT)
Dept: NEUROLOGY | Facility: CLINIC | Age: 64
End: 2024-11-22
Payer: COMMERCIAL

## 2024-11-22 NOTE — TELEPHONE ENCOUNTER
Refill Encounter    PCP Visits: Recent Visits  Date Type Provider Dept   11/14/24 Office Visit Andrew Rodriguez MD [facility] Internal Medicine   08/23/24 Office Visit Andrew Rodriguez MD [facility]   05/17/24 Office Visit Andrew Rodriguez MD [facility]   02/22/24 Office Visit Andrew Rodriguez MD [facility]   Showing recent visits within past 360 days and meeting all other requirements  Future Appointments  No visits were found meeting these conditions.  Showing future appointments within next 720 days and meeting all other requirements     Last 3 Blood Pressure:   BP Readings from Last 3 Encounters:   11/14/24 100/68   11/11/24 130/65   10/22/24 (!) 150/67     Preferred Pharmacy:   [id number] [place] [facility] [address] [facility] [address] [facility]  [address]  [facility]  [place] [address]  Phone: [phone] Fax: [phone]    Requested RX:  Requested Prescriptions     Pending Prescriptions Disp Refills    naltrexone (DEPADE) 50 mg tablet [Pharmacy Med Name: NALTREXONE 50MG TABLETS] 30 tablet      Sig: TAKE 1 TABLET(50 MG) BY MOUTH DAILY    ELIQUIS 5 mg Tab [Pharmacy Med Name: ELIQUIS 5MG TABLETS] 60 tablet 0     Sig: TABLET 1 TABLET BY MOUTH TWICE DAILY    magnesium oxide (MAG-OX) 400 mg (241.3 mg magnesium) tablet [Pharmacy Med Name: MAG-OXIDE 400MG TABLETS] 30 tablet      Sig: TAKE 1 TABLET BY MOUTH EVERY MORNING      RX Route: Normal

## 2024-11-22 NOTE — TELEPHONE ENCOUNTER
No care due was identified.  [facility] Embedded Care Due Messages. Reference number: [id number].   11/22/2024 9:19:26 AM CST

## 2024-11-22 NOTE — TELEPHONE ENCOUNTER
Called patient to reschedule appointment. Patient confirmed rescheduled date for 12/20 at 1 PM. Informed of date, time, and location of appointment.

## 2024-11-24 RX ORDER — NALTREXONE HYDROCHLORIDE 50 MG/1
50 TABLET, FILM COATED ORAL DAILY
Qty: 30 TABLET | Refills: 0 | OUTPATIENT
Start: 2024-11-24

## 2024-11-24 RX ORDER — APIXABAN 5 MG/1
TABLET, FILM COATED ORAL
Qty: 60 TABLET | Refills: 0 | OUTPATIENT
Start: 2024-11-24

## 2024-11-24 RX ORDER — LANOLIN ALCOHOL/MO/W.PET/CERES
400 CREAM (GRAM) TOPICAL DAILY
Qty: 7 TABLET | Refills: 0 | OUTPATIENT
Start: 2024-11-24

## 2024-11-24 RX ORDER — NALTREXONE HYDROCHLORIDE 50 MG/1
TABLET, FILM COATED ORAL
Qty: 30 TABLET | OUTPATIENT
Start: 2024-11-24

## 2024-11-24 RX ORDER — METFORMIN HYDROCHLORIDE 500 MG/1
500 TABLET, EXTENDED RELEASE ORAL DAILY
Qty: 360 TABLET | Refills: 3 | Status: SHIPPED | OUTPATIENT
Start: 2024-11-24

## 2024-11-24 RX ORDER — LANOLIN ALCOHOL/MO/W.PET/CERES
1 CREAM (GRAM) TOPICAL EVERY MORNING
Qty: 90 TABLET | Refills: 0 | Status: SHIPPED | OUTPATIENT
Start: 2024-11-24

## 2024-11-24 NOTE — TELEPHONE ENCOUNTER
-I do not rec pt continue Eliquis due to her risk of bleeding being greater than stroke. Further discussion on r/b to be had with cardiologist.   -I am not Rx of Naltrexone for pt. This request should go to her psychiatrist please.  -Magnesium sent in

## 2024-11-26 ENCOUNTER — PATIENT MESSAGE (OUTPATIENT)
Dept: GASTROENTEROLOGY | Facility: CLINIC | Age: 64
End: 2024-11-26
Payer: COMMERCIAL

## 2024-12-03 ENCOUNTER — OFFICE VISIT (OUTPATIENT)
Dept: INTERNAL MEDICINE | Facility: CLINIC | Age: 64
End: 2024-12-03
Payer: COMMERCIAL

## 2024-12-03 ENCOUNTER — PATIENT MESSAGE (OUTPATIENT)
Dept: GASTROENTEROLOGY | Facility: CLINIC | Age: 64
End: 2024-12-03
Payer: COMMERCIAL

## 2024-12-03 ENCOUNTER — PATIENT MESSAGE (OUTPATIENT)
Dept: INTERNAL MEDICINE | Facility: CLINIC | Age: 64
End: 2024-12-03

## 2024-12-03 VITALS
WEIGHT: 206.56 LBS | OXYGEN SATURATION: 95 % | HEART RATE: 70 BPM | BODY MASS INDEX: 33.2 KG/M2 | DIASTOLIC BLOOD PRESSURE: 70 MMHG | HEIGHT: 66 IN | SYSTOLIC BLOOD PRESSURE: 112 MMHG

## 2024-12-03 DIAGNOSIS — M25.50 ARTHRALGIA, UNSPECIFIED JOINT: Primary | ICD-10-CM

## 2024-12-03 DIAGNOSIS — R52 DIFFUSE PAIN: ICD-10-CM

## 2024-12-03 DIAGNOSIS — M79.7 FIBROMYALGIA: ICD-10-CM

## 2024-12-03 PROCEDURE — 99999 PR PBB SHADOW E&M-EST. PATIENT-LVL V: CPT | Mod: PBBFAC,,,

## 2024-12-03 RX ORDER — CYCLOBENZAPRINE HCL 5 MG
5 TABLET ORAL 3 TIMES DAILY PRN
Qty: 40 TABLET | Refills: 0 | Status: SHIPPED | OUTPATIENT
Start: 2024-12-03 | End: 2024-12-03

## 2024-12-03 RX ORDER — CYCLOBENZAPRINE HCL 5 MG
5 TABLET ORAL 3 TIMES DAILY PRN
Qty: 30 TABLET | Refills: 0 | Status: SHIPPED | OUTPATIENT
Start: 2024-12-03 | End: 2024-12-13

## 2024-12-03 RX ORDER — DICLOFENAC SODIUM 10 MG/G
2 GEL TOPICAL 4 TIMES DAILY
Qty: 100 G | Refills: 11 | Status: SHIPPED | OUTPATIENT
Start: 2024-12-03

## 2024-12-03 NOTE — PROGRESS NOTES
"    CHIEF COMPLAINT     Chief Complaint   Patient presents with    Pain     Arms, hands, feet, neck       HPI     Earl Abdul is a 64 y.o. female who presents for urgent care visit of generalized pain today.    PCP is Andrew Rodriguez MD, patient is new to me. Pt consents to AI recording of visit for documentation purposes.     History of Present Illness    CHIEF COMPLAINT:  Patient presents today with widespread body pain, especially in the joints.    WIDESPREAD BODY PAIN/Hx of Fibromyalgia:  She reports widespread body pain, particularly affecting her joints, hands, legs, feet, shoulder, and neck. The pain has been present intermittently for the past month but has been occurring daily for the last week with increasing severity. Patient states she does have a history of fibromyalgia as well but states that she may going through a "flare" of her pain. She describes the pain as severe enough to interfere with sleep. Over-the-counter pain medications including Advil, Aleve, and Tylenol are not providing adequate relief. She has previously tried muscle relaxants such as robaxin in the past but found them ineffective for pain management. She reports her gabapentin does not help her pain either. She is currently taking vancomycin for C. Diff and has been adherent to this course. She also has a history of CLL which is now in remission. Pt also endorses fatigue. Denies fever, chills, abd pain, HA, leg swelling, or any other complaints. During the course of the visit patient was repeatedly mentioning the need for pain medicine and became increasing agitated with the provider during the visit.                 Home Medications:  Prior to Admission medications    Medication Sig Start Date End Date Taking? Authorizing Provider   apixaban (ELIQUIS) 5 mg Tab Take 1 tablet (5 mg total) by mouth 2 (two) times daily. 3/6/24   Hand, Kvng LAMBERT MD   atogepant (QULIPTA) 60 mg Tab Take 60 mg by mouth once daily.    Provider, " Historical   clonazePAM (KLONOPIN) 0.5 MG tablet Take 0.25 mg by mouth every evening. 10/28/24   Provider, Historical   cyanocobalamin (VITAMIN B-12) 1000 MCG tablet Take 100 mcg by mouth once daily. Once every morning.    Provider, Historical   donepeziL (ARICEPT) 5 MG tablet Take 1 tablet by mouth every evening. 7/29/24 7/29/25  Provider, Historical   EScitalopram oxalate (LEXAPRO) 10 MG tablet Take 10 mg by mouth once daily.    Provider, Historical   fluticasone furoate (ARNUITY ELLIPTA) 100 mcg/actuation inhaler Inhale 1 puff into the lungs once daily. Rinse mouth after each use. 9/23/24   Shaq Jones MD   folic acid (FOLVITE) 1 MG tablet Take 1 tablet (1 mg total) by mouth once daily. 3/6/24 5/17/24  Kvng Hargrove MD   gabapentin (NEURONTIN) 300 MG capsule Take 1 capsule (300 mg total) by mouth 3 (three) times daily.  Patient taking differently: Take 400 mg by mouth 3 (three) times daily. 3/6/24 8/23/24  Kvng Hargrove MD   hydrOXYzine pamoate (VISTARIL) 50 MG Cap Take 50 mg by mouth daily as needed. 11/12/24   Provider, Historical   lancets Misc Use to check blood glucose 4 times daily 11/29/23   Tahira Canela MD   levothyroxine (SYNTHROID) 75 MCG tablet Take 1 tablet (75 mcg total) by mouth before breakfast. 8/28/24   Andrew Rodriguez MD   magnesium oxide (MAG-OX) 400 mg (241.3 mg magnesium) tablet TAKE 1 TABLET BY MOUTH EVERY MORNING 11/24/24   Andrew Rodriguez MD   metFORMIN (GLUCOPHAGE-XR) 500 MG ER 24hr tablet Take 1 tablet (500 mg total) by mouth once daily. 11/24/24   Andrew Rodriguez MD   multivitamin Tab Take 1 tablet by mouth once daily. 3/6/24   Kvng Hargrove MD   naltrexone (DEPADE) 50 mg tablet Take 50 mg by mouth once daily.    Provider, Historical   omega-3 fatty acids/fish oil (FISH OIL-OMEGA-3 FATTY ACIDS) 300-1,000 mg capsule Take 2 capsules by mouth once daily. Take mornings and nights.    Provider, Historical   omeprazole (PRILOSEC) 20 MG capsule Take 1  capsule (20 mg total) by mouth once daily. 3/6/24   Kvng Hargrove MD   ondansetron (ZOFRAN) 4 MG tablet Take 1 tablet (4 mg total) by mouth daily as needed for Nausea. 11/14/24   Andrew Rodriguez MD   propranoloL (INDERAL) 20 MG tablet Take 20 mg by mouth 2 (two) times daily.    Provider, Historical   QUEtiapine (SEROQUEL) 50 MG tablet Take 50 mg by mouth every evening. 7/29/24   Provider, Historical   sod sulf-pot chloride-mag sulf (SUTAB) 1.479-0.188- 0.225 gram tablet Take 12 tablets by mouth once daily. Take as directed by provider office 11/11/24   Leidy Sampson MD   sod sulf-pot chloride-mag sulf (SUTAB) 1.479-0.188- 0.225 gram tablet Take 12 tablets by mouth once daily. Take according to instructions provided by Endoscopy Nurse. 11/13/24   Leidy Sampson MD   traZODone (DESYREL) 100 MG tablet Take 2 tablets (200 mg total) by mouth every evening. 6/9/24 11/11/24  Kvng Parsons MD   TURMERIC ORAL Take 250 mg by mouth every morning. TurmericXL    Provider, Historical   vancomycin (VANCOCIN) 125 MG capsule Take 1 capsule (125 mg total) by mouth 4 (four) times daily for 10 days, THEN 1 capsule (125 mg total) 2 (two) times a day for 7 days, THEN 1 capsule (125 mg total) once daily for 7 days, THEN 1 capsule (125 mg total) every 48 hours for 14 days. 11/13/24 12/21/24  Nima Johnson MD   vortioxetine (TRINTELLIX) 5 mg Tab Take 5 mg by mouth every morning.    Provider, Historical   omeprazole (PRILOSEC OTC) 20 MG tablet Take 20 mg by mouth once daily.  3/6/24  Provider, Historical       Health Maintainence:   Immunizations:  Health Maintenance         Date Due Completion Date    Shingles Vaccine (1 of 2) Never done ---    RSV Vaccine (Age 60+ and Pregnant patients) (1 - Risk 60-74 years 1-dose series) Never done ---    Pneumococcal Vaccines (Age 65+) (2 of 2 - PPSV23 or PCV20) 12/22/2019 10/27/2019    Diabetes Urine Screening 03/10/2023 3/10/2022    Foot Exam 03/10/2023 3/10/2022    Eye Exam  "05/23/2023 5/23/2022    Lipid Panel 04/06/2024 4/6/2023    Influenza Vaccine (1) 09/01/2024 10/27/2019    COVID-19 Vaccine (4 - 2024-25 season) 09/01/2024 4/22/2022    Hemoglobin A1c 12/15/2024 6/15/2024    DEXA Scan 05/24/2025 5/24/2021    Colorectal Cancer Screening 09/09/2025 9/9/2020    TETANUS VACCINE 07/09/2030 7/9/2020             PHYSICAL EXAM     /70 (BP Location: Right arm, Patient Position: Sitting)   Pulse 70   Ht 5' 6" (1.676 m)   Wt 93.7 kg (206 lb 9.1 oz)   SpO2 95%   BMI 33.34 kg/m²     Physical Exam  Constitutional:       General: She is not in acute distress.     Appearance: Normal appearance. She is not toxic-appearing.   HENT:      Head: Normocephalic and atraumatic.      Right Ear: External ear normal.      Left Ear: External ear normal.      Nose: Nose normal.      Mouth/Throat:      Mouth: Mucous membranes are moist.   Eyes:      Extraocular Movements: Extraocular movements intact.   Cardiovascular:      Rate and Rhythm: Normal rate and regular rhythm.      Pulses: Normal pulses.      Heart sounds: Normal heart sounds.   Pulmonary:      Effort: Pulmonary effort is normal. No respiratory distress.   Abdominal:      General: Abdomen is flat.      Palpations: Abdomen is soft.      Tenderness: There is no abdominal tenderness.   Musculoskeletal:      Cervical back: Normal range of motion and neck supple.   Skin:     General: Skin is warm.      Findings: No bruising or erythema.   Neurological:      General: No focal deficit present.      Mental Status: She is alert.   Psychiatric:         Mood and Affect: Mood normal.         LABS     Lab Results   Component Value Date    HGBA1C 5.9 (H) 06/09/2024     CMP  Sodium   Date Value Ref Range Status   10/22/2024 137 136 - 145 mmol/L Final     Potassium   Date Value Ref Range Status   10/22/2024 4.1 3.5 - 5.1 mmol/L Final     Chloride   Date Value Ref Range Status   10/22/2024 104 95 - 110 mmol/L Final     CO2   Date Value Ref Range Status "   10/22/2024 24 23 - 29 mmol/L Final     Glucose   Date Value Ref Range Status   10/22/2024 92 70 - 110 mg/dL Final     BUN   Date Value Ref Range Status   10/22/2024 13 8 - 23 mg/dL Final     Creatinine   Date Value Ref Range Status   10/22/2024 0.9 0.5 - 1.4 mg/dL Final     Calcium   Date Value Ref Range Status   10/22/2024 8.9 8.7 - 10.5 mg/dL Final     Total Protein   Date Value Ref Range Status   10/22/2024 6.5 6.0 - 8.4 g/dL Final     Albumin   Date Value Ref Range Status   10/22/2024 4.0 3.5 - 5.2 g/dL Final     Total Bilirubin   Date Value Ref Range Status   10/22/2024 0.7 0.1 - 1.0 mg/dL Final     Comment:     For infants and newborns, interpretation of results should be based  on gestational age, weight and in agreement with clinical  observations.    Premature Infant recommended reference ranges:  Up to 24 hours.............<8.0 mg/dL  Up to 48 hours............<12.0 mg/dL  3-5 days..................<15.0 mg/dL  6-29 days.................<15.0 mg/dL       Alkaline Phosphatase   Date Value Ref Range Status   10/22/2024 42 40 - 150 U/L Final     AST   Date Value Ref Range Status   10/22/2024 16 10 - 40 U/L Final     ALT   Date Value Ref Range Status   10/22/2024 14 10 - 44 U/L Final     Anion Gap   Date Value Ref Range Status   10/22/2024 9 8 - 16 mmol/L Final     eGFR if    Date Value Ref Range Status   06/22/2022 >60.0 >60 mL/min/1.73 m^2 Final     eGFR if non    Date Value Ref Range Status   06/22/2022 >60.0 >60 mL/min/1.73 m^2 Final     Comment:     Calculation used to obtain the estimated glomerular filtration  rate (eGFR) is the CKD-EPI equation.        Lab Results   Component Value Date    WBC 10.00 10/22/2024    HGB 10.7 (L) 10/22/2024    HCT 38 10/22/2024    MCV 81 (L) 10/22/2024     (L) 10/22/2024     Lab Results   Component Value Date    CHOL 184 04/06/2023    CHOL 163 03/10/2022    CHOL 194 07/19/2017     Lab Results   Component Value Date    HDL 44  "04/06/2023    HDL 47 03/10/2022    HDL 31 (L) 07/19/2017     Lab Results   Component Value Date    LDLCALC 107.8 04/06/2023    LDLCALC 78.2 03/10/2022    LDLCALC 131.0 07/19/2017     Lab Results   Component Value Date    TRIG 161 (H) 04/06/2023    TRIG 189 (H) 03/10/2022    TRIG 160 (H) 07/19/2017     Lab Results   Component Value Date    CHOLHDL 23.9 04/06/2023    CHOLHDL 28.8 03/10/2022    CHOLHDL 16.0 (L) 07/19/2017     Lab Results   Component Value Date    TSH 1.637 06/07/2024       ASSESSMENT/PLAN   Assessment & Plan    Patient here today for same day appointment  Considered patient's history of leukemia in remission and current C. diff treatment with vancomycin  Assessed widespread joint and body pain, potentially related to extended antibiotic use leading to exacerbation of underlying fibromyalgia symptoms  Evaluated options for pain management, weighing risks and benefits seeing patient's extensive medical history and prior alcohol use disorder, GERD, hx of DEVANTE, that she would be better managed by non-opioid based medications at it's lowest dose as tolerated. Offered patient voltaren gel prn and other non-pharmacologic measures for fibromyalgia for help with associated with her pain flare however patient then became angry at provider and aggressive, did use profanity at least twice, stated she "made this appointment to get pain medication". Patient was then offered flexeril as an alternative to her prior robaxin use along with referral to pain management evaluation. Patient agreed to this plan. Patient also inquiring about her upcoming rheumatology appointment and demanded to be seen earlier. Staff and provider described to patient that changing scheduling for patients for specialists is a system process and we would advocate for her on a wait list (Patient was able to be rescheduled from 1/10 to 12/12 in rheumatology clinic. Patient declined ordering of initial rheumatologic labs today.     PLAN " SUMMARY:  Referred to pain management for fibromyalgia tamiko  Referred to rheumatology for evaluation of widespread joint pain  Started Voltaren gel (diclofenac gel) for topical pain relief  Trial short course flexeril 5mg prn for fibromyalgia pain   Recommend OTC lidocaine patches for localized pain relief  Patient to contact rheumatology and pain management clinics for earlier appointments  Follow up after scheduling appointments with rheumatology and pain management    FOLLOW-UP:  PCP Dr. Rodriguez for follow up prn         Earl was seen today for pain.    Diagnoses and all orders for this visit:    Arthralgia, unspecified joint  -     diclofenac sodium (VOLTAREN) 1 % Gel; Apply 2 g topically 4 (four) times daily.  -     Ambulatory referral/consult to Rheumatology; Future    Diffuse pain  -     Discontinue: cyclobenzaprine (FLEXERIL) 5 MG tablet; Take 1 tablet (5 mg total) by mouth 3 (three) times daily as needed for Muscle spasms.  -     Ambulatory referral/consult to Pain Clinic; Future  -     cyclobenzaprine (FLEXERIL) 5 MG tablet; Take 1 tablet (5 mg total) by mouth 3 (three) times daily as needed for Muscle spasms.    Fibromyalgia          Luis Rojas MD   Department of Internal Medicine - [facility]  1:29 PM

## 2024-12-05 ENCOUNTER — TELEPHONE (OUTPATIENT)
Dept: INTERNAL MEDICINE | Facility: CLINIC | Age: 64
End: 2024-12-05
Payer: COMMERCIAL

## 2024-12-05 ENCOUNTER — TELEPHONE (OUTPATIENT)
Dept: GASTROENTEROLOGY | Facility: CLINIC | Age: 64
End: 2024-12-05
Payer: COMMERCIAL

## 2024-12-05 NOTE — TELEPHONE ENCOUNTER
Spoke with patient while Dr. Johnson is out of office this week. She is still having diarrhea with abdominal pain although noted frequency seems to have improved from 10+ BMs per day down to about 5 per day since being on vancomycin taper. But still has significant nausea and abdominal pain and she is frustrated with still having diarrhea. Will review options alternative options with staff and update her tomorrow, but with at least partial improvement, discussed that it would be helpful to complete the vancomycin taper before using an alternative treatment. She understands and is thankful for the call.

## 2024-12-05 NOTE — TELEPHONE ENCOUNTER
"Spoke with patient's spouse, Henrique. States patient has had significant N/D since starting vancomycin (VANCOCIN) 125 MG capsule approx. 3 weeks ago. Informed Henrique I would relay this message to Dr. Rodriguez & other care providers. Henrique expressed understanding and gratitude for phone call.  Call ended.      "     ----- Message from Luis sent at 12/5/2024  9:29 AM CST -----  Regarding: Urgent Callback  Contact: [phone] or [phone]  Patient's caretaker Faustina calling requesting a callback from nurse or provider in regards to nauseas and severe diarrhea. She also said the antibiotics are being ineffective. Please call back as soon as possible.         "  Message forwarded to Dr. Johnson; C C'delaney Rodriguez.   "

## 2024-12-05 NOTE — TELEPHONE ENCOUNTER
Spoke with patient's . She has been on Vancomycin since 11/13. She is still having 4-5 watery BMs every day.   She is having a lot of nausea.  She is having joint pain as well.

## 2024-12-05 NOTE — TELEPHONE ENCOUNTER
----- Message from Luis sent at 12/5/2024  9:29 AM CST -----  Regarding: Urgent Callback  Contact: [phone] or [phone]  Patient's caretaker Faustina calling requesting a callback from nurse or provider in regards to nauseas and severe diarrhea. She also said the antibiotics are being ineffective. Please call back as soon as possible.

## 2024-12-05 NOTE — TELEPHONE ENCOUNTER
----- Message from Nurse Rodriguez sent at 12/5/2024  4:06 PM CST -----  Regarding: FW: Urgent Callback  Contact: [phone] or [phone]  Chilo,  Please see telephone note for this patient.  ----- Message -----  From: Luis Cristobal  Sent: 12/5/2024   9:34 AM CST  To: [facility] Staff  Subject: Urgent Callback                                  Patient's caretaker Faustina calling requesting a callback from nurse or provider in regards to nauseas and severe diarrhea. She also said the antibiotics are being ineffective. Please call back as soon as possible.

## 2024-12-06 ENCOUNTER — PATIENT MESSAGE (OUTPATIENT)
Dept: GASTROENTEROLOGY | Facility: CLINIC | Age: 64
End: 2024-12-06
Payer: COMMERCIAL

## 2024-12-06 ENCOUNTER — TELEPHONE (OUTPATIENT)
Dept: GASTROENTEROLOGY | Facility: CLINIC | Age: 64
End: 2024-12-06
Payer: COMMERCIAL

## 2024-12-06 RX ORDER — CHOLESTYRAMINE 4 G/9G
4 POWDER, FOR SUSPENSION ORAL NIGHTLY
Qty: 30 PACKET | Refills: 0 | Status: SHIPPED | OUTPATIENT
Start: 2024-12-06 | End: 2025-01-05

## 2024-12-06 NOTE — TELEPHONE ENCOUNTER
Discussed case with attending Dr. Eagle. Will trial a course of cholestyramine as an adjunct for diarrhea while on vanc taper. Discussed with Ms Abdul who is amenable.    Alan Perales  Gastroenterology and Hepatology Fellow, PGY-VI

## 2024-12-11 ENCOUNTER — OFFICE VISIT (OUTPATIENT)
Dept: PAIN MEDICINE | Facility: CLINIC | Age: 64
End: 2024-12-11
Payer: COMMERCIAL

## 2024-12-11 VITALS
SYSTOLIC BLOOD PRESSURE: 112 MMHG | BODY MASS INDEX: 33.34 KG/M2 | WEIGHT: 206.56 LBS | OXYGEN SATURATION: 100 % | RESPIRATION RATE: 17 BRPM | TEMPERATURE: 98 F | DIASTOLIC BLOOD PRESSURE: 71 MMHG | HEART RATE: 70 BPM

## 2024-12-11 DIAGNOSIS — T74.92XA CHILDHOOD ABUSE: ICD-10-CM

## 2024-12-11 DIAGNOSIS — M79.7 FIBROMYALGIA: Primary | ICD-10-CM

## 2024-12-11 PROCEDURE — 3044F HG A1C LEVEL LT 7.0%: CPT | Mod: CPTII,S$GLB,, | Performed by: ANESTHESIOLOGY

## 2024-12-11 PROCEDURE — 3074F SYST BP LT 130 MM HG: CPT | Mod: CPTII,S$GLB,, | Performed by: ANESTHESIOLOGY

## 2024-12-11 PROCEDURE — 1125F AMNT PAIN NOTED PAIN PRSNT: CPT | Mod: CPTII,S$GLB,, | Performed by: ANESTHESIOLOGY

## 2024-12-11 PROCEDURE — 3008F BODY MASS INDEX DOCD: CPT | Mod: CPTII,S$GLB,, | Performed by: ANESTHESIOLOGY

## 2024-12-11 PROCEDURE — 99204 OFFICE O/P NEW MOD 45 MIN: CPT | Mod: S$GLB,,, | Performed by: ANESTHESIOLOGY

## 2024-12-11 PROCEDURE — 3078F DIAST BP <80 MM HG: CPT | Mod: CPTII,S$GLB,, | Performed by: ANESTHESIOLOGY

## 2024-12-11 PROCEDURE — 99999 PR PBB SHADOW E&M-EST. PATIENT-LVL III: CPT | Mod: PBBFAC,,, | Performed by: ANESTHESIOLOGY

## 2024-12-11 PROCEDURE — 4010F ACE/ARB THERAPY RXD/TAKEN: CPT | Mod: CPTII,S$GLB,, | Performed by: ANESTHESIOLOGY
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
## 2024-12-12 ENCOUNTER — TELEPHONE (OUTPATIENT)
Dept: ADMINISTRATIVE | Facility: OTHER | Age: 64
End: 2024-12-12
Payer: COMMERCIAL

## 2024-12-12 ENCOUNTER — OFFICE VISIT (OUTPATIENT)
Dept: RHEUMATOLOGY | Facility: CLINIC | Age: 64
End: 2024-12-12
Payer: COMMERCIAL

## 2024-12-12 VITALS
SYSTOLIC BLOOD PRESSURE: 131 MMHG | TEMPERATURE: 98 F | RESPIRATION RATE: 18 BRPM | BODY MASS INDEX: 33.03 KG/M2 | OXYGEN SATURATION: 94 % | HEART RATE: 102 BPM | HEIGHT: 66 IN | WEIGHT: 205.5 LBS | DIASTOLIC BLOOD PRESSURE: 74 MMHG

## 2024-12-12 DIAGNOSIS — Z71.89 COUNSELING AND COORDINATION OF CARE: ICD-10-CM

## 2024-12-12 DIAGNOSIS — M25.50 ARTHRALGIA, UNSPECIFIED JOINT: ICD-10-CM

## 2024-12-12 DIAGNOSIS — D86.9 SARCOIDOSIS: Primary | ICD-10-CM

## 2024-12-12 PROCEDURE — 3075F SYST BP GE 130 - 139MM HG: CPT | Mod: CPTII,S$GLB,, | Performed by: STUDENT IN AN ORGANIZED HEALTH CARE EDUCATION/TRAINING PROGRAM

## 2024-12-12 PROCEDURE — 3288F FALL RISK ASSESSMENT DOCD: CPT | Mod: CPTII,S$GLB,, | Performed by: STUDENT IN AN ORGANIZED HEALTH CARE EDUCATION/TRAINING PROGRAM

## 2024-12-12 PROCEDURE — 99999 PR PBB SHADOW E&M-EST. PATIENT-LVL IV: CPT | Mod: PBBFAC,,, | Performed by: STUDENT IN AN ORGANIZED HEALTH CARE EDUCATION/TRAINING PROGRAM

## 2024-12-12 PROCEDURE — 99205 OFFICE O/P NEW HI 60 MIN: CPT | Mod: S$GLB,,, | Performed by: STUDENT IN AN ORGANIZED HEALTH CARE EDUCATION/TRAINING PROGRAM

## 2024-12-12 PROCEDURE — 3008F BODY MASS INDEX DOCD: CPT | Mod: CPTII,S$GLB,, | Performed by: STUDENT IN AN ORGANIZED HEALTH CARE EDUCATION/TRAINING PROGRAM

## 2024-12-12 PROCEDURE — 1125F AMNT PAIN NOTED PAIN PRSNT: CPT | Mod: CPTII,S$GLB,, | Performed by: STUDENT IN AN ORGANIZED HEALTH CARE EDUCATION/TRAINING PROGRAM

## 2024-12-12 PROCEDURE — 3078F DIAST BP <80 MM HG: CPT | Mod: CPTII,S$GLB,, | Performed by: STUDENT IN AN ORGANIZED HEALTH CARE EDUCATION/TRAINING PROGRAM

## 2024-12-12 PROCEDURE — 1101F PT FALLS ASSESS-DOCD LE1/YR: CPT | Mod: CPTII,S$GLB,, | Performed by: STUDENT IN AN ORGANIZED HEALTH CARE EDUCATION/TRAINING PROGRAM

## 2024-12-12 RX ORDER — TRAMADOL HYDROCHLORIDE 50 MG/1
50 TABLET ORAL EVERY 4 HOURS PRN
Qty: 28 TABLET | Refills: 0 | Status: SHIPPED | OUTPATIENT
Start: 2024-12-12 | End: 2024-12-19

## 2024-12-12 NOTE — PROGRESS NOTES
Answers submitted by the patient for this visit:  Rheumatology Questionnaire (Submitted on 12/12/2024)  fever: No  eye redness: No  mouth sores: No  headaches: Yes  shortness of breath: No  chest pain: No  trouble swallowing: No  diarrhea: No  constipation: No     RHEUMATOLOGY OUTPATIENT CLINIC NOTE    1/22/2025    Attending Rheumatologist: Josh Connelly  Primary Care Provider: Andrew Rodriguez MD   Physician Requesting Consultation: Luis Rojas MD  [address] [facility]  [address]  [place] [address]  Chief Complaint/Reason For Consultation:  Consult      Subjective:       HPI  Earl Abdul is a 64 y.o. White female with pmhx noted below referred for   Crohn's //CLL (once a year)  Sarcoidosis   No flare uo in yrs     Review of Systems   Constitutional:  Negative for fever and unexpected weight change.   HENT:  Negative for mouth sores and trouble swallowing.    Eyes:  Negative for redness.   Respiratory:  Negative for cough and shortness of breath.    Cardiovascular:  Negative for chest pain.   Gastrointestinal:  Negative for constipation and diarrhea.   Genitourinary:  Negative for dysuria and genital sores.   Integumentary:  Negative for rash.   Neurological:  Positive for headaches.   Hematological:  Does not bruise/bleed easily.        Chronic comorbid conditions affecting medical decision making today:  Past Medical History:   Diagnosis Date    Alcoholism     c/b alcohol withdrawl seizures 7/2017    Anemia     Aortic atherosclerosis 04/17/2024    C. difficile colitis     Cancer of breast 10/2020    s/p bilateral mastectomy for  T1b N0 stage IA breast cancer October 2020    CLL (chronic lymphocytic leukemia) 09/30/2022 6/22/22 - PB flow cytometry  Immunophenotyping of peripheral blood detects a distinct kappa light chain restricted monoclonal B-cell population  (calculated at 2.44x10 9 /L, from the most recent CBC showing a total WBC of  7.35 K/uL with 61% total lymphocytes)  with a CLL  phenotype (coexpression of CD19, CD5, CD23 and dim CD20). CD22 (FITC), FMC-7 and CD38 are negative in this population.    Controlled type 2 diabetes mellitus without complication, without long-term current use of insulin 11/30/2021    COPD (chronic obstructive pulmonary disease)     Depression     Diverticulitis     Fatty liver     GERD (gastroesophageal reflux disease)     Hyperlipidemia     Hypertension     Pancreatitis     Peptic ulcer disease     Polysubstance abuse     Posterior reversible encephalopathy syndrome     Sarcoidosis of lung     over 30 yrs ago    Seizures     last seizure 2 years ago    Suicide attempt      Past Surgical History:   Procedure Laterality Date    APPENDECTOMY      BILATERAL MASTECTOMY Bilateral 10/29/2020    Procedure: MASTECTOMY, BILATERAL;  Surgeon: Baylee Kevin MD;  Location: [facility] OR [address] [place] [address];  Service: General;  Laterality: Bilateral;    BREAST REVISION SURGERY Bilateral 2/11/2021    Procedure: BREAST REVISION SURGERY;  Surgeon: Scottie Johnson MD;  Location: [facility] OR [address] [place] [address];  Service: Plastics;  Laterality: Bilateral;    COLONOSCOPY N/A 7/28/2017    Procedure: COLONOSCOPY;  Surgeon: Aaron Alvarado MD;  Location: [facility];  Service: Endoscopy;  Laterality: N/A;    COLONOSCOPY, SCREENING, HIGH RISK PATIENT N/A 1/9/2025    Procedure: COLONOSCOPY, SCREENING, HIGH RISK PATIENT;  Surgeon: Aayush Valente MD;  Location: [facility] ([address] [place] [address]);  Service: Endoscopy;  Laterality: N/A;    ESOPHAGOGASTRODUODENOSCOPY  10/7/2016, 11/6/2014    2016 - gastritis, duodenitis, 2014 erosive gastritis    ESOPHAGOGASTRODUODENOSCOPY N/A 2/11/2020    Procedure: ESOPHAGOGASTRODUODENOSCOPY (EGD);  Surgeon: Fawn Garrido MD;  Location: [facility];  Service: Endoscopy;  Laterality: N/A;    ESOPHAGOGASTRODUODENOSCOPY N/A 4/19/2021    Procedure: EGD (ESOPHAGOGASTRODUODENOSCOPY);  Surgeon: Paramjit Martino MD;  Location: [facility];  Service: Endoscopy;  Laterality: N/A;     ESOPHAGOGASTRODUODENOSCOPY N/A 1/9/2025    Procedure: EGD (ESOPHAGOGASTRODUODENOSCOPY);  Surgeon: Aayush Valente MD;  Location: [address]);  Service: Endoscopy;  Laterality: N/A;  referral dr santiago/ prep inst given in office/ pt requested sutabs/diabetic/eliquis  11/12 Precall performed. Pt needs rx called and instructions resent to [facility]  11/13/24- Rx and instructions sent as requested. DBM  11/14 pt r/s, Ok to hold Eliqui    FLEXIBLE SIGMOIDOSCOPY  11/06/2014    colitis    HYSTERECTOMY      IMPLANTATION OF PERMANENT SACRAL NERVE STIMULATOR N/A 7/12/2022    Procedure: INSERTION, NEUROSTIMULATOR, PERMANENT, SACRAL;  Surgeon: Juaquin Edwards MD;  Location: [facility] OR [address] [place] [address];  Service: Urology;  Laterality: N/A;  1hr    INJECTION FOR SENTINEL NODE IDENTIFICATION Right 10/29/2020    Procedure: INJECTION, FOR SENTINEL NODE IDENTIFICATION;  Surgeon: Baylee Kevin MD;  Location: [address];  Service: General;  Laterality: Right;    INJECTION OF JOINT Right 10/10/2019    Procedure: Injection, Joint RIGHT ILIOPSOAS BURSA/TENDON INJECTION AND RIGHT GLUTEAL TENDON INJECTION WITH STEROID AND LIDOCAINE;  Surgeon: Guillaume Rico MD;  Location: [facility];  Service: Pain Management;  Laterality: Right;  NEEDS CONSENT    INSERTION OF BREAST TISSUE EXPANDER Bilateral 10/29/2020    Procedure: INSERTION, TISSUE EXPANDER, BREAST;  Surgeon: Scottie Johnson MD;  Location: [address];  Service: Plastics;  Laterality: Bilateral;  Right breast: 1082 g  Left breast: 1076 g    LIPOSUCTION Bilateral 2/11/2021    Procedure: LIPOSUCTION;  Surgeon: Scottie Johnson MD;  Location: [address];  Service: Plastics;  Laterality: Bilateral;    mediastenoscopy      REPLACEMENT OF IMPLANT OF BREAST Bilateral 2/11/2021    Procedure: REPLACEMENT, IMPLANT, BREAST;  Surgeon: Scottie Johnson MD;  Location: [address];  Service: Plastics;  Laterality: Bilateral;    SENTINEL LYMPH NODE BIOPSY  Right 10/29/2020    Procedure: BIOPSY, LYMPH NODE, SENTINEL;  Surgeon: Baylee Kevin MD;  Location: [facility] OR [address] [place] [address];  Service: General;  Laterality: Right;    TONSILLECTOMY N/A 1970    TUBAL LIGATION       Family History   Problem Relation Name Age of Onset    Heart attack Father      Diabetes Father      Hypertension Father      Diabetes Mother      Hypertension Mother      Breast cancer Maternal Aunt      Colon cancer Maternal Uncle      Breast cancer Daughter      Ovarian cancer Neg Hx      Cancer Neg Hx       Social History     Substance and Sexual Activity   Alcohol Use Not Currently    Comment: vodka daily (half a regular bottle) for 4 days     Social History     Tobacco Use   Smoking Status Every Day    Current packs/day: 0.00    Average packs/day: 0.5 packs/day for 30.0 years (15.0 ttl pk-yrs)    Types: Vaping with nicotine, Cigarettes    Start date: 2/1/1991    Last attempt to quit: 2/1/2021    Years since quitting: 3.9   Smokeless Tobacco Never   Tobacco Comments    Patient is currently smoking 10 cigarettes a day, declines nicotine patches     Social History     Substance and Sexual Activity   Drug Use Not Currently    Types: Marijuana    Comment: gummies       Current Outpatient Medications:     atogepant (QULIPTA) 60 mg Tab, Take 60 mg by mouth once daily., Disp: , Rfl:     cholestyramine (QUESTRAN) 4 gram packet, Take 1 packet (4 g total) by mouth every evening. Avoid taking other medications one hour before or 4 hours after taking this medication. Can interfere with absorption of other medications., Disp: 30 packet, Rfl: 0    clonazePAM (KLONOPIN) 0.5 MG tablet, Take 0.25 mg by mouth every evening., Disp: , Rfl:     cyanocobalamin (VITAMIN B-12) 1000 MCG tablet, Take 100 mcg by mouth once daily. Once every morning., Disp: , Rfl:     diclofenac sodium (VOLTAREN) 1 % Gel, Apply 2 g topically 4 (four) times daily., Disp: 100 g, Rfl: 11    donepeziL (ARICEPT) 5 MG tablet, Take 1 tablet by mouth every  evening., Disp: , Rfl:     EScitalopram oxalate (LEXAPRO) 10 MG tablet, Take 10 mg by mouth once daily. (Patient not taking: Reported on 12/26/2024), Disp: , Rfl:     fluticasone furoate (ARNUITY ELLIPTA) 100 mcg/actuation inhaler, Inhale 1 puff into the lungs once daily. Rinse mouth after each use., Disp: 30 each, Rfl: 11    hydrOXYzine pamoate (VISTARIL) 50 MG Cap, Take 50 mg by mouth daily as needed., Disp: , Rfl:     lancets Misc, Use to check blood glucose 4 times daily, Disp: 100 each, Rfl: 5    levothyroxine (SYNTHROID) 75 MCG tablet, Take 1 tablet (75 mcg total) by mouth before breakfast., Disp: 90 tablet, Rfl: 1    magnesium oxide (MAG-OX) 400 mg (241.3 mg magnesium) tablet, TAKE 1 TABLET BY MOUTH EVERY MORNING, Disp: 90 tablet, Rfl: 0    metFORMIN (GLUCOPHAGE-XR) 500 MG ER 24hr tablet, Take 1 tablet (500 mg total) by mouth once daily., Disp: 360 tablet, Rfl: 3    multivitamin Tab, Take 1 tablet by mouth once daily., Disp: 30 tablet, Rfl: 0    naltrexone (DEPADE) 50 mg tablet, Take 50 mg by mouth once daily., Disp: , Rfl:     omega-3 fatty acids/fish oil (FISH OIL-OMEGA-3 FATTY ACIDS) 300-1,000 mg capsule, Take 2 capsules by mouth once daily. Take mornings and nights., Disp: , Rfl:     omeprazole (PRILOSEC) 20 MG capsule, Take 1 capsule (20 mg total) by mouth once daily., Disp: 30 capsule, Rfl: 0    propranoloL (INDERAL) 20 MG tablet, Take 20 mg by mouth 2 (two) times daily., Disp: , Rfl:     QUEtiapine (SEROQUEL) 50 MG tablet, Take 50 mg by mouth every evening., Disp: , Rfl:     TURMERIC ORAL, Take 250 mg by mouth every morning. TurmericXL, Disp: , Rfl:     vortioxetine (TRINTELLIX) 5 mg Tab, Take 5 mg by mouth every morning., Disp: , Rfl:     amitriptyline (ELAVIL) 25 MG tablet, Take 25-50 mg by mouth every evening., Disp: , Rfl:     apixaban (ELIQUIS) 5 mg Tab, Take 1 tablet (5 mg total) by mouth 2 (two) times daily., Disp: 180 tablet, Rfl: 0    folic acid (FOLVITE) 1 MG tablet, Take 1 tablet (1 mg  total) by mouth once daily., Disp: 30 tablet, Rfl: 0    gabapentin (NEURONTIN) 300 MG capsule, Take 1 capsule (300 mg total) by mouth 3 (three) times daily. (Patient taking differently: Take 400 mg by mouth 3 (three) times daily.), Disp: 90 capsule, Rfl: 0    naproxen (NAPROSYN) 500 MG tablet, Take 1 tablet (500 mg total) by mouth 2 (two) times a day., Disp: 30 tablet, Rfl: 0    ondansetron (ZOFRAN) 8 MG tablet, Take 1 tablet (8 mg total) by mouth every 8 (eight) hours as needed for Nausea., Disp: 30 tablet, Rfl: 0    scopolamine (TRANSDERM-SCOP) 1.3-1.5 mg (1 mg over 3 days), Place 1 patch onto the skin every 72 hours., Disp: 7 patch, Rfl: 0    tiotropium bromide (SPIRIVA RESPIMAT) 2.5 mcg/actuation inhaler, Inhale 2 puffs into the lungs Daily. Controller, Disp: 4 g, Rfl: 11    traZODone (DESYREL) 100 MG tablet, Take 2 tablets (200 mg total) by mouth every evening., Disp: 60 tablet, Rfl: 0  No current facility-administered medications for this visit.    Facility-Administered Medications Ordered in Other Visits:     albuterol sulfate nebulizer solution 2.5 mg, 2.5 mg, Nebulization, Once, Andrew Rodriguez MD     Objective:         Vitals:    12/12/24 1315   BP: 131/74   Pulse: 102   Resp: 18   Temp: 97.9 °F (36.6 °C)     Physical Exam   Constitutional: She is oriented to person, place, and time.   HENT:   Head: Normocephalic and atraumatic.   Right Ear: External ear normal.   Left Ear: External ear normal.   Nose: Nose normal.   Mouth/Throat: Oropharynx is clear and moist.   Eyes: Pupils are equal, round, and reactive to light. Conjunctivae are normal.   Cardiovascular: Normal rate and regular rhythm.   Pulmonary/Chest: Effort normal and breath sounds normal.   Abdominal: Soft. Bowel sounds are normal.   Musculoskeletal:         General: Tenderness present.      Cervical back: Normal range of motion and neck supple.      Comments: Multiple tender/trigger points    Neurological: She is alert and oriented to  "person, place, and time.   Skin: No rash noted. No erythema.   Psychiatric: Mood and affect normal.       Reviewed old and all outside pertinent medical records available.    All lab results personally reviewed and interpreted by me.  Lab Results   Component Value Date    WBC 12.30 12/18/2024    HGB 10.4 (L) 12/18/2024    HCT 35.1 (L) 12/18/2024    MCV 82 12/18/2024    MCH 24.4 (L) 12/18/2024    MCHC 29.6 (L) 12/18/2024    RDW 17.0 (H) 12/18/2024     (L) 12/18/2024    MPV 11.8 12/18/2024    PLTEST Decreased (A) 08/06/2024       Lab Results   Component Value Date     12/18/2024    K 4.4 12/18/2024     12/18/2024    CO2 22 (L) 12/18/2024     (H) 12/18/2024    BUN 10 12/18/2024    CALCIUM 9.1 12/18/2024    PROT 6.8 12/18/2024    ALBUMIN 3.8 12/18/2024    BILITOT 0.3 12/18/2024    AST 19 12/18/2024    ALKPHOS 60 12/18/2024    ALT 15 12/18/2024       Lab Results   Component Value Date    COLORU Yellow 10/22/2024    APPEARANCEUA Clear 10/22/2024    SPECGRAV 1.010 10/22/2024    PHUR 6.0 10/22/2024    PROTEINUA Negative 10/22/2024    KETONESU Trace (A) 10/22/2024    LEUKOCYTESUR 3+ (A) 10/22/2024    NITRITE Negative 10/22/2024    UROBILINOGEN Negative 04/07/2024       Lab Results   Component Value Date    CRP 2.2 12/18/2024       Lab Results   Component Value Date    SEDRATE 9 12/18/2024       Lab Results   Component Value Date    ROYCE Negative 02/11/2009    RF <13.0 12/18/2024    SEDRATE 9 12/18/2024       No components found for: "25OHVITDTOT", "44HURAWS0", "70AQIWOW9", "METHODNOTE"    Lab Results   Component Value Date    URICACID 8.7 (H) 10/15/2014       No components found for: "TSPOTTB"    Lab Results   Component Value Date    ROYCE Negative 02/11/2009        Imaging:  All imaging reviewed and independently interpreted by me.         ASSESSMENT / PLAN:     Earl Abdul is a 64 y.o. White female with:      1. Arthralgia, unspecified joint  - Ambulatory referral/consult to Rheumatology  - " traMADoL (ULTRAM) 50 mg tablet; Take 1 tablet (50 mg total) by mouth every 4 (four) hours as needed for Pain.  Dispense: 28 tablet; Refill: 0    2. Sarcoidosis (Primary)  - ROYCE Screen w/Reflex; Future  - Cyclic Citrullinated Peptide Antibody, IgG; Future  - Rheumatoid Factor; Future  - Proteinase 3 Autoantibodies; Future  - Myeloperoxidase Antibody (MPO); Future  - MyoMarker Panel 3; Future  - RNA polymerase III Ab, IgG; Future  - Anti-Histone Antibody; Future  - Anti-Neutrophilic Cytoplasmic Antibody; Future  - Anti-Scleroderma Antibody; Future  - C3 Complement; Future  - C4 Complement; Future  - Angiotensin Converting Enzyme; Future  - INTERLEUKIN-2 RECEPTOR; Future  - Vitamin D; Future  - Calcitriol; Future  - Sedimentation rate; Future  - C-Reactive Protein; Future  - traMADoL (ULTRAM) 50 mg tablet; Take 1 tablet (50 mg total) by mouth every 4 (four) hours as needed for Pain.  Dispense: 28 tablet; Refill: 0    3. Counseling and coordination of care  - over 10 minutes spent regarding below topics:  - Immunization counseling done.  - Weight loss counseling done.  - Nutrition and exercise counseling.  - Limitation of alcohol consumption.  - Regular exercise:  Aerobic and resistance.  - Medication counseling provided.        Follow up in about 3 months (around 3/12/2025).    Method of contact with patient concerns: [facility]    Disclaimer:  This note is prepared using voice recognition software and as such is likely to have errors and has not been proof read. Please contact me for questions.     Time spent: 60 minutes in face to face discussion concerning diagnosis, prognosis, review of lab and test results, benefits of treatment as well as management of disease, counseling of patient and coordination of care between various health care providers.  Greater than half the time spent was used for coordination of care and counseling of patient.    Josh Connelly M.D.  Rheumatology Department    [facility]   unexpected weight change: No  genital sore: No  dysuria: No  During the last 3 days, have you had a skin rash?: No  Bruises or bleeds easily: No  cough: No

## 2024-12-12 NOTE — TELEPHONE ENCOUNTER
Left voice message for patient to return call to schedule appointment from referral to Functional Restoration department. My Chart message to be sent.  Tova ZELAYA [phone]

## 2024-12-12 NOTE — PROGRESS NOTES
Answers submitted by the patient for this visit:  Rheumatology Questionnaire (Submitted on 12/12/2024)  fever: No  eye redness: No  mouth sores: No  headaches: Yes  shortness of breath: No  chest pain: No  trouble swallowing: No  diarrhea: No  constipation: No  unexpected weight change: No  genital sore: No  dysuria: No  During the last 3 days, have you had a skin rash?: No  Bruises or bleeds easily: No  cough: No

## 2024-12-16 NOTE — TELEPHONE ENCOUNTER
Called patient to address her concerns. No answer, left voicemail.    Nima Johnson MD  PGY-VI, GI & Hepatology

## 2024-12-16 NOTE — TELEPHONE ENCOUNTER
Called patient. She reports she is better - having 3-4 semi-formed BM daily, no blood in stool. 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 She is taking Questran appropriately at night. Asked her to use some PRN Imodium in the AM.     Nima Johnson MD  PGY-VI, GI/Hepatology

## 2024-12-17 ENCOUNTER — TELEPHONE (OUTPATIENT)
Dept: GASTROENTEROLOGY | Facility: CLINIC | Age: 64
End: 2024-12-17
Payer: COMMERCIAL

## 2024-12-17 DIAGNOSIS — A04.72 C. DIFFICILE COLITIS: Primary | ICD-10-CM

## 2024-12-17 NOTE — TELEPHONE ENCOUNTER
----- Message from Kizzy sent at 12/17/2024  9:32 AM CST -----  Regarding: Experiencing symptoms  Contact: Jess @ [phone]  Pt is calling to speak to someone in the office to discuss symptoms they are having. Pt is asking for a call back today. Please call to advise. Thanks.         Symptoms: stomach cramps, loose bowels and bleeding from raw skin

## 2024-12-18 ENCOUNTER — LAB VISIT (OUTPATIENT)
Dept: LAB | Facility: HOSPITAL | Age: 64
End: 2024-12-18
Payer: COMMERCIAL

## 2024-12-18 DIAGNOSIS — D86.9 SARCOIDOSIS: ICD-10-CM

## 2024-12-18 DIAGNOSIS — A04.72 C. DIFFICILE COLITIS: ICD-10-CM

## 2024-12-18 LAB
25(OH)D3+25(OH)D2 SERPL-MCNC: 26 NG/ML (ref 30–96)
ALBUMIN SERPL BCP-MCNC: 3.8 G/DL (ref 3.5–5.2)
ALP SERPL-CCNC: 60 U/L (ref 40–150)
ALT SERPL W/O P-5'-P-CCNC: 15 U/L (ref 10–44)
ANION GAP SERPL CALC-SCNC: 10 MMOL/L (ref 8–16)
AST SERPL-CCNC: 19 U/L (ref 10–40)
BASOPHILS # BLD AUTO: 0.04 K/UL (ref 0–0.2)
BASOPHILS NFR BLD: 0.3 % (ref 0–1.9)
BILIRUB SERPL-MCNC: 0.3 MG/DL (ref 0.1–1)
BUN SERPL-MCNC: 10 MG/DL (ref 8–23)
C3 SERPL-MCNC: 107 MG/DL (ref 50–180)
C4 SERPL-MCNC: 13 MG/DL (ref 11–44)
CALCIUM SERPL-MCNC: 9.1 MG/DL (ref 8.7–10.5)
CCP AB SER IA-ACNC: 0.9 U/ML
CHLORIDE SERPL-SCNC: 105 MMOL/L (ref 95–110)
CO2 SERPL-SCNC: 22 MMOL/L (ref 23–29)
CREAT SERPL-MCNC: 1 MG/DL (ref 0.5–1.4)
CRP SERPL-MCNC: 2.2 MG/L (ref 0–8.2)
DIFFERENTIAL METHOD BLD: ABNORMAL
EOSINOPHIL # BLD AUTO: 0.1 K/UL (ref 0–0.5)
EOSINOPHIL NFR BLD: 0.8 % (ref 0–8)
ERYTHROCYTE [DISTWIDTH] IN BLOOD BY AUTOMATED COUNT: 17 % (ref 11.5–14.5)
ERYTHROCYTE [SEDIMENTATION RATE] IN BLOOD BY PHOTOMETRIC METHOD: 9 MM/HR (ref 0–36)
EST. GFR  (NO RACE VARIABLE): >60 ML/MIN/1.73 M^2
GLUCOSE SERPL-MCNC: 147 MG/DL (ref 70–110)
HCT VFR BLD AUTO: 35.1 % (ref 37–48.5)
HGB BLD-MCNC: 10.4 G/DL (ref 12–16)
IMM GRANULOCYTES # BLD AUTO: 0.03 K/UL (ref 0–0.04)
IMM GRANULOCYTES NFR BLD AUTO: 0.2 % (ref 0–0.5)
LYMPHOCYTES # BLD AUTO: 7 K/UL (ref 1–4.8)
LYMPHOCYTES NFR BLD: 57.1 % (ref 18–48)
MCH RBC QN AUTO: 24.4 PG (ref 27–31)
MCHC RBC AUTO-ENTMCNC: 29.6 G/DL (ref 32–36)
MCV RBC AUTO: 82 FL (ref 82–98)
MONOCYTES # BLD AUTO: 0.7 K/UL (ref 0.3–1)
MONOCYTES NFR BLD: 6 % (ref 4–15)
NEUTROPHILS # BLD AUTO: 4.4 K/UL (ref 1.8–7.7)
NEUTROPHILS NFR BLD: 35.6 % (ref 38–73)
NRBC BLD-RTO: 0 /100 WBC
PLATELET # BLD AUTO: 140 K/UL (ref 150–450)
PMV BLD AUTO: 11.8 FL (ref 9.2–12.9)
POTASSIUM SERPL-SCNC: 4.4 MMOL/L (ref 3.5–5.1)
PROT SERPL-MCNC: 6.8 G/DL (ref 6–8.4)
RBC # BLD AUTO: 4.26 M/UL (ref 4–5.4)
RHEUMATOID FACT SERPL-ACNC: <13 IU/ML (ref 0–15)
SODIUM SERPL-SCNC: 137 MMOL/L (ref 136–145)
WBC # BLD AUTO: 12.3 K/UL (ref 3.9–12.7)

## 2024-12-18 PROCEDURE — 36415 COLL VENOUS BLD VENIPUNCTURE: CPT | Mod: PN | Performed by: STUDENT IN AN ORGANIZED HEALTH CARE EDUCATION/TRAINING PROGRAM

## 2024-12-18 PROCEDURE — 82652 VIT D 1 25-DIHYDROXY: CPT | Performed by: STUDENT IN AN ORGANIZED HEALTH CARE EDUCATION/TRAINING PROGRAM

## 2024-12-18 PROCEDURE — 86431 RHEUMATOID FACTOR QUANT: CPT | Performed by: STUDENT IN AN ORGANIZED HEALTH CARE EDUCATION/TRAINING PROGRAM

## 2024-12-18 PROCEDURE — 85025 COMPLETE CBC W/AUTO DIFF WBC: CPT | Performed by: STUDENT IN AN ORGANIZED HEALTH CARE EDUCATION/TRAINING PROGRAM

## 2024-12-18 PROCEDURE — 83516 IMMUNOASSAY NONANTIBODY: CPT | Mod: 59 | Performed by: STUDENT IN AN ORGANIZED HEALTH CARE EDUCATION/TRAINING PROGRAM

## 2024-12-18 PROCEDURE — 83520 IMMUNOASSAY QUANT NOS NONAB: CPT | Mod: 59 | Performed by: STUDENT IN AN ORGANIZED HEALTH CARE EDUCATION/TRAINING PROGRAM

## 2024-12-18 PROCEDURE — 86038 ANTINUCLEAR ANTIBODIES: CPT | Performed by: STUDENT IN AN ORGANIZED HEALTH CARE EDUCATION/TRAINING PROGRAM

## 2024-12-18 PROCEDURE — 83520 IMMUNOASSAY QUANT NOS NONAB: CPT | Performed by: STUDENT IN AN ORGANIZED HEALTH CARE EDUCATION/TRAINING PROGRAM

## 2024-12-18 PROCEDURE — 83516 IMMUNOASSAY NONANTIBODY: CPT | Performed by: STUDENT IN AN ORGANIZED HEALTH CARE EDUCATION/TRAINING PROGRAM

## 2024-12-18 PROCEDURE — 86036 ANCA SCREEN EACH ANTIBODY: CPT | Mod: 59 | Performed by: STUDENT IN AN ORGANIZED HEALTH CARE EDUCATION/TRAINING PROGRAM

## 2024-12-18 PROCEDURE — 80053 COMPREHEN METABOLIC PANEL: CPT | Performed by: STUDENT IN AN ORGANIZED HEALTH CARE EDUCATION/TRAINING PROGRAM

## 2024-12-18 PROCEDURE — 85652 RBC SED RATE AUTOMATED: CPT | Performed by: STUDENT IN AN ORGANIZED HEALTH CARE EDUCATION/TRAINING PROGRAM

## 2024-12-18 PROCEDURE — 82306 VITAMIN D 25 HYDROXY: CPT | Performed by: STUDENT IN AN ORGANIZED HEALTH CARE EDUCATION/TRAINING PROGRAM

## 2024-12-18 PROCEDURE — 86160 COMPLEMENT ANTIGEN: CPT | Performed by: STUDENT IN AN ORGANIZED HEALTH CARE EDUCATION/TRAINING PROGRAM

## 2024-12-18 PROCEDURE — 86140 C-REACTIVE PROTEIN: CPT | Performed by: STUDENT IN AN ORGANIZED HEALTH CARE EDUCATION/TRAINING PROGRAM

## 2024-12-18 PROCEDURE — 86160 COMPLEMENT ANTIGEN: CPT | Mod: 59 | Performed by: STUDENT IN AN ORGANIZED HEALTH CARE EDUCATION/TRAINING PROGRAM

## 2024-12-18 PROCEDURE — 86200 CCP ANTIBODY: CPT | Performed by: STUDENT IN AN ORGANIZED HEALTH CARE EDUCATION/TRAINING PROGRAM

## 2024-12-18 PROCEDURE — 82164 ANGIOTENSIN I ENZYME TEST: CPT | Performed by: STUDENT IN AN ORGANIZED HEALTH CARE EDUCATION/TRAINING PROGRAM

## 2024-12-18 PROCEDURE — 86235 NUCLEAR ANTIGEN ANTIBODY: CPT | Performed by: STUDENT IN AN ORGANIZED HEALTH CARE EDUCATION/TRAINING PROGRAM

## 2024-12-18 RX ORDER — DICYCLOMINE HYDROCHLORIDE 10 MG/1
10 CAPSULE ORAL 3 TIMES DAILY PRN
Qty: 120 CAPSULE | Refills: 0 | Status: SHIPPED | OUTPATIENT
Start: 2024-12-18 | End: 2025-01-17

## 2024-12-18 NOTE — TELEPHONE ENCOUNTER
Called patient. She is having significant abdominal cramping. Is taking Questran QPM, but only sporadically taking Imodium QAM. No fever or chills today.     Will prescribe PRN Bentyl. Ordered basic labs, X ray KUB, and stool studies. Asked her to go to ER if pain was severe.     She thanked me for my call.     Nima Johnson MD  PGY-VI, GI/Hepatology

## 2024-12-19 ENCOUNTER — HOSPITAL ENCOUNTER (OUTPATIENT)
Dept: RADIOLOGY | Facility: HOSPITAL | Age: 64
Discharge: HOME | End: 2024-12-19
Attending: STUDENT IN AN ORGANIZED HEALTH CARE EDUCATION/TRAINING PROGRAM
Payer: COMMERCIAL

## 2024-12-19 DIAGNOSIS — A04.72 C. DIFFICILE COLITIS: ICD-10-CM

## 2024-12-19 LAB — ANA SER QL IF: NORMAL

## 2024-12-19 PROCEDURE — 74018 RADEX ABDOMEN 1 VIEW: CPT | Mod: 26,,, | Performed by: INTERNAL MEDICINE

## 2024-12-19 PROCEDURE — 74018 RADEX ABDOMEN 1 VIEW: CPT | Mod: TC

## 2024-12-19 NOTE — PROGRESS NOTES
Your X ray looks fine. There are no concerning findings (like a dilated bowel).    Please contact me if you have any additional concerns.    Sincerely,    Nima Johnson

## 2024-12-20 ENCOUNTER — LAB VISIT (OUTPATIENT)
Dept: LAB | Facility: HOSPITAL | Age: 64
End: 2024-12-20
Payer: COMMERCIAL

## 2024-12-20 DIAGNOSIS — A04.72 C. DIFFICILE COLITIS: ICD-10-CM

## 2024-12-20 LAB
ACE SERPL-CCNC: <5 U/L (ref 16–85)
PROTEINASE3 IGG SER-ACNC: <0.2 U

## 2024-12-20 PROCEDURE — 87427 SHIGA-LIKE TOXIN AG IA: CPT | Mod: 59 | Performed by: STUDENT IN AN ORGANIZED HEALTH CARE EDUCATION/TRAINING PROGRAM

## 2024-12-20 PROCEDURE — 87046 STOOL CULTR AEROBIC BACT EA: CPT | Performed by: STUDENT IN AN ORGANIZED HEALTH CARE EDUCATION/TRAINING PROGRAM

## 2024-12-20 PROCEDURE — 87449 NOS EACH ORGANISM AG IA: CPT | Mod: 91 | Performed by: STUDENT IN AN ORGANIZED HEALTH CARE EDUCATION/TRAINING PROGRAM

## 2024-12-20 PROCEDURE — 87045 FECES CULTURE AEROBIC BACT: CPT | Performed by: STUDENT IN AN ORGANIZED HEALTH CARE EDUCATION/TRAINING PROGRAM

## 2024-12-20 PROCEDURE — 87324 CLOSTRIDIUM AG IA: CPT | Performed by: STUDENT IN AN ORGANIZED HEALTH CARE EDUCATION/TRAINING PROGRAM

## 2024-12-20 PROCEDURE — 87493 C DIFF AMPLIFIED PROBE: CPT | Performed by: STUDENT IN AN ORGANIZED HEALTH CARE EDUCATION/TRAINING PROGRAM

## 2024-12-20 NOTE — PROGRESS NOTES
Earl, basic blood work looks okay.     Please contact me if you have any additional concerns.    Sincerely,    Nima Johnson

## 2024-12-21 LAB
C DIFF GDH STL QL: POSITIVE
C DIFF TOX A+B STL QL IA: NEGATIVE
C DIFF TOX GENS STL QL NAA+PROBE: NEGATIVE
HISTONE IGG SER IA-ACNC: 0.2 UNITS (ref 0–0.9)

## 2024-12-22 LAB
E COLI SXT1 STL QL IA: NEGATIVE
E COLI SXT2 STL QL IA: NEGATIVE
SOL IL2 RECEP SERPL-MCNC: 1422.6 PG/ML (ref 175.3–858.2)

## 2024-12-22 NOTE — TELEPHONE ENCOUNTER
Care Due:                  Date            Visit Type   Department     Provider  --------------------------------------------------------------------------------                                EP -                              PRIMARY      [facility] INTERNAL  Last Visit: 11-      CARE ([facility])   MEDICINE       Andrew Rodriguez  Next Visit: None Scheduled  None         None Found                                                            Last  Test          Frequency    Reason                     Performed    Due Date  --------------------------------------------------------------------------------    HBA1C.......  6 months...  metFORMIN................  06- 12-    Health [facility] Embedded Care Due Messages. Reference number: [id number].   12/22/2024 10:11:38 AM CST

## 2024-12-23 LAB
1,25(OH)2D3 SERPL-MCNC: 34 PG/ML (ref 20–79)
ANCA AB TITR SER IF: NORMAL TITER
BACTERIA STL CULT: NORMAL
ENA SCL70 AB SER-ACNC: <0.6 U/ML
MYELOPEROXIDASE AB SER-ACNC: 0.3 U/ML
P-ANCA TITR SER IF: NORMAL TITER

## 2024-12-23 NOTE — PROGRESS NOTES
Earl, your C diff toxin PCR is now negative. What this means is the C diff bacteria in the gut are no longer making toxin. This is good news.     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 Lets continue using Imodium up to 4 times a day (as needed) & Questran powder at bedtime.     Please contact me if you have any additional concerns.    Sincerely,    Nima Johnson

## 2024-12-24 RX ORDER — ONDANSETRON 4 MG/1
TABLET, FILM COATED ORAL
Qty: 30 TABLET | Refills: 0 | Status: SHIPPED | OUTPATIENT
Start: 2024-12-24 | End: 2024-12-26 | Stop reason: SDUPTHER

## 2024-12-26 ENCOUNTER — TELEPHONE (OUTPATIENT)
Dept: INTERNAL MEDICINE | Facility: CLINIC | Age: 64
End: 2024-12-26
Payer: COMMERCIAL

## 2024-12-26 ENCOUNTER — HOSPITAL ENCOUNTER (OUTPATIENT)
Dept: RADIOLOGY | Facility: OTHER | Age: 64
Discharge: HOME | End: 2024-12-26
Attending: COUNSELOR
Payer: COMMERCIAL

## 2024-12-26 ENCOUNTER — OFFICE VISIT (OUTPATIENT)
Dept: INTERNAL MEDICINE | Facility: CLINIC | Age: 64
End: 2024-12-26
Payer: COMMERCIAL

## 2024-12-26 VITALS
SYSTOLIC BLOOD PRESSURE: 132 MMHG | OXYGEN SATURATION: 95 % | WEIGHT: 203.94 LBS | DIASTOLIC BLOOD PRESSURE: 80 MMHG | BODY MASS INDEX: 32.78 KG/M2 | HEART RATE: 77 BPM | TEMPERATURE: 98 F | HEIGHT: 66 IN

## 2024-12-26 DIAGNOSIS — J44.9 CHRONIC OBSTRUCTIVE PULMONARY DISEASE, UNSPECIFIED COPD TYPE: ICD-10-CM

## 2024-12-26 DIAGNOSIS — J06.9 ACUTE URI: Primary | ICD-10-CM

## 2024-12-26 DIAGNOSIS — R05.1 ACUTE COUGH: ICD-10-CM

## 2024-12-26 LAB
CTP QC/QA: YES
CTP QC/QA: YES
POC MOLECULAR INFLUENZA A AGN: NEGATIVE
POC MOLECULAR INFLUENZA B AGN: NEGATIVE
RNAP III AB SER-ACNC: <20 UNITS
SARS-COV-2 RDRP RESP QL NAA+PROBE: NEGATIVE

## 2024-12-26 PROCEDURE — 71046 X-RAY EXAM CHEST 2 VIEWS: CPT | Mod: 26,,, | Performed by: RADIOLOGY

## 2024-12-26 PROCEDURE — 71046 X-RAY EXAM CHEST 2 VIEWS: CPT | Mod: TC,FY

## 2024-12-26 PROCEDURE — 99999 PR PBB SHADOW E&M-EST. PATIENT-LVL V: CPT | Mod: PBBFAC,,, | Performed by: COUNSELOR

## 2024-12-26 RX ORDER — ONDANSETRON HYDROCHLORIDE 8 MG/1
8 TABLET, FILM COATED ORAL EVERY 8 HOURS PRN
Qty: 30 TABLET | Refills: 0 | Status: SHIPPED | OUTPATIENT
Start: 2024-12-26

## 2024-12-26 RX ORDER — AZITHROMYCIN 250 MG/1
TABLET, FILM COATED ORAL
Qty: 6 TABLET | Refills: 0 | Status: SHIPPED | OUTPATIENT
Start: 2024-12-26 | End: 2024-12-27 | Stop reason: SINTOL

## 2024-12-26 RX ORDER — BENZONATATE 100 MG/1
200 CAPSULE ORAL 3 TIMES DAILY PRN
Qty: 30 CAPSULE | Refills: 0 | Status: SHIPPED | OUTPATIENT
Start: 2024-12-26 | End: 2025-01-05

## 2024-12-26 RX ORDER — PREDNISONE 10 MG/1
TABLET ORAL
Qty: 7 TABLET | Refills: 0 | Status: SHIPPED | OUTPATIENT
Start: 2024-12-26 | End: 2024-12-27

## 2024-12-26 RX ORDER — TIOTROPIUM BROMIDE INHALATION SPRAY 3.12 UG/1
2 SPRAY, METERED RESPIRATORY (INHALATION) DAILY
Qty: 4 G | Refills: 11 | Status: SHIPPED | OUTPATIENT
Start: 2024-12-26

## 2024-12-26 RX ORDER — AMITRIPTYLINE HYDROCHLORIDE 25 MG/1
25-50 TABLET, FILM COATED ORAL NIGHTLY
COMMUNITY
Start: 2024-12-14

## 2024-12-26 RX ORDER — ALBUTEROL SULFATE 2.5 MG/.5ML
2.5 SOLUTION RESPIRATORY (INHALATION) ONCE
Status: DISCONTINUED | OUTPATIENT
Start: 2024-12-26 | End: 2024-12-26

## 2024-12-26 RX ORDER — ALBUTEROL SULFATE 1.25 MG/3ML
2.5 SOLUTION RESPIRATORY (INHALATION) EVERY 6 HOURS PRN
Qty: 75 ML | Refills: 0 | Status: CANCELLED | OUTPATIENT
Start: 2024-12-26 | End: 2025-12-26

## 2024-12-26 NOTE — PROGRESS NOTES
Chief Complaint  Chief Complaint   Patient presents with    URI     Started 4 days ago: S.O.B, chest congestion, green mucus, head aches, body aches.     Cough       HPI  Earl Abdul is a 64 y.o. female  with history of well controlled diabetes, COPD, fibromyalgia, CLL, sarcoidosis, and dementia associated with alcohol abuse who presents for acute cough today. Patient is present with her caregiver, who provides some of the history. According to the patient, she began having a productive cough about 4 days ago. It has progressively worsened, and she notices having to use more home oxygen of 3 L during the day (normally only uses 3 L at night). She reports some shortness of breath, but her oxygen saturation has remained at typical levels with pulse oximeter. She has increased sputum production that is green in color. She has some chest pain with coughing that is localized to her right breast. It has disrupted her sleep some. She also reports increased body aches, which are usually present with fibromyalgia. She reports significant fatigue.  She has tried taking ibuprofen and tylenol with robitussin with minimal relief.   She denies any fevers or nasal congestion preceding or during this episode.     She follows with her pulmonologist, Dr. Jones, for COPD. She appears to have needed azithromycin and prednisone for a presumptive COPD exacerbation 9/2024. She has not had any hospitalizations for COPD in the past year.   She currently smokes 8-10 cigarettes per day and vapes regularly.   She reports not using her inhalers prescribed by Dr. Jones.     Flu/COVID testing negative in office, and chest xray negative for consolidations or signs of infection.    PAST MEDICAL HISTORY:  Past Medical History:   Diagnosis Date    Alcoholism     c/b alcohol withdrawl seizures 7/2017    Anemia     Aortic atherosclerosis 04/17/2024    C. difficile colitis     Cancer of breast 10/2020    s/p bilateral mastectomy for  T1b N0 stage  IA breast cancer October 2020    CLL (chronic lymphocytic leukemia) 09/30/2022 6/22/22 - PB flow cytometry  Immunophenotyping of peripheral blood detects a distinct kappa light chain restricted monoclonal B-cell population  (calculated at 2.44x10 9 /L, from the most recent CBC showing a total WBC of  7.35 K/uL with 61% total lymphocytes)  with a CLL phenotype (coexpression of CD19, CD5, CD23 and dim CD20). CD22 (FITC), FMC-7 and CD38 are negative in this population.    Controlled type 2 diabetes mellitus without complication, without long-term current use of insulin 11/30/2021    COPD (chronic obstructive pulmonary disease)     Depression     Diverticulitis     Fatty liver     GERD (gastroesophageal reflux disease)     Hyperlipidemia     Hypertension     Pancreatitis     Peptic ulcer disease     Polysubstance abuse     Posterior reversible encephalopathy syndrome     Sarcoidosis of lung     over 30 yrs ago    Suicide attempt        PAST SURGICAL HISTORY:  Past Surgical History:   Procedure Laterality Date    APPENDECTOMY      BILATERAL MASTECTOMY Bilateral 10/29/2020    Procedure: MASTECTOMY, BILATERAL;  Surgeon: Baylee Kevin MD;  Location: [facility] OR [address] [place] [address];  Service: General;  Laterality: Bilateral;    BREAST REVISION SURGERY Bilateral 2/11/2021    Procedure: BREAST REVISION SURGERY;  Surgeon: Scottie Johnson MD;  Location: [facility] OR [address] [place] [address];  Service: Plastics;  Laterality: Bilateral;    COLONOSCOPY N/A 7/28/2017    Procedure: COLONOSCOPY;  Surgeon: Aaron Alvarado MD;  Location: [facility];  Service: Endoscopy;  Laterality: N/A;    ESOPHAGOGASTRODUODENOSCOPY  10/7/2016, 11/6/2014    2016 - gastritis, duodenitis, 2014 erosive gastritis    ESOPHAGOGASTRODUODENOSCOPY N/A 2/11/2020    Procedure: ESOPHAGOGASTRODUODENOSCOPY (EGD);  Surgeon: Fawn Garrido MD;  Location: [facility];  Service: Endoscopy;  Laterality: N/A;    ESOPHAGOGASTRODUODENOSCOPY N/A 4/19/2021    Procedure: EGD  (ESOPHAGOGASTRODUODENOSCOPY);  Surgeon: Paramjit Martino MD;  Location: [facility] ENDO;  Service: Endoscopy;  Laterality: N/A;    FLEXIBLE SIGMOIDOSCOPY  11/06/2014    colitis    HYSTERECTOMY      IMPLANTATION OF PERMANENT SACRAL NERVE STIMULATOR N/A 7/12/2022    Procedure: INSERTION, NEUROSTIMULATOR, PERMANENT, SACRAL;  Surgeon: Juaquin Edwards MD;  Location: [facility] OR [address] [place] [address];  Service: Urology;  Laterality: N/A;  1hr    INJECTION FOR SENTINEL NODE IDENTIFICATION Right 10/29/2020    Procedure: INJECTION, FOR SENTINEL NODE IDENTIFICATION;  Surgeon: Baylee Kevin MD;  Location: [address];  Service: General;  Laterality: Right;    INJECTION OF JOINT Right 10/10/2019    Procedure: Injection, Joint RIGHT ILIOPSOAS BURSA/TENDON INJECTION AND RIGHT GLUTEAL TENDON INJECTION WITH STEROID AND LIDOCAINE;  Surgeon: Guillaume Rico MD;  Location: [facility] PAIN MGT;  Service: Pain Management;  Laterality: Right;  NEEDS CONSENT    INSERTION OF BREAST TISSUE EXPANDER Bilateral 10/29/2020    Procedure: INSERTION, TISSUE EXPANDER, BREAST;  Surgeon: Scottie Johnson MD;  Location: [address];  Service: Plastics;  Laterality: Bilateral;  Right breast: 1082 g  Left breast: 1076 g    LIPOSUCTION Bilateral 2/11/2021    Procedure: LIPOSUCTION;  Surgeon: Scottie Johnson MD;  Location: [address];  Service: Plastics;  Laterality: Bilateral;    mediastenoscopy      REPLACEMENT OF IMPLANT OF BREAST Bilateral 2/11/2021    Procedure: REPLACEMENT, IMPLANT, BREAST;  Surgeon: Scottie Johnson MD;  Location: [address];  Service: Plastics;  Laterality: Bilateral;    SENTINEL LYMPH NODE BIOPSY Right 10/29/2020    Procedure: BIOPSY, LYMPH NODE, SENTINEL;  Surgeon: Baylee Kevin MD;  Location: [address];  Service: General;  Laterality: Right;    TONSILLECTOMY N/A 1970    TUBAL LIGATION         SOCIAL HISTORY:  Social History     Socioeconomic History    Marital status:    Tobacco Use    Smoking  status: Every Day     Current packs/day: 0.00     Average packs/day: 0.5 packs/day for 30.0 years (15.0 ttl pk-yrs)     Types: Vaping with nicotine, Cigarettes     Start date: 2/1/1991     Last attempt to quit: 2/1/2021     Years since quitting: 3.9    Smokeless tobacco: Never    Tobacco comments:     Patient is currently smoking 10 cigarettes a day, declines nicotine patches   Substance and Sexual Activity    Alcohol use: Yes     Comment: vodka daily (half a regular bottle) for 4 days    Drug use: Yes     Types: Marijuana     Comment: gummies    Sexual activity: Yes     Birth control/protection: Surgical     Social Drivers of Health     Financial Resource Strain: Low Risk  (10/28/2024)    Received from [facility]    Overall Financial Resource Strain (CARDIA)     Difficulty of Paying Living Expenses: Not hard at all   Food Insecurity: No Food Insecurity (10/28/2024)    Received from [facility]    Hunger Vital Sign     Worried About Running Out of Food in the Last Year: Never true     Ran Out of Food in the Last Year: Never true   Transportation Needs: No Transportation Needs (10/28/2024)    Received from [facility]    PRAPARE - Transportation     Lack of Transportation (Medical): No     Lack of Transportation (Non-Medical): No   Physical Activity: Unknown (10/28/2024)    Received from [facility]    Exercise Vital Sign     Days of Exercise per Week: 0 days   Recent Concern: Physical Activity - Inactive (8/7/2024)    Exercise Vital Sign     Days of Exercise per Week: 0 days     Minutes of Exercise per Session: 0 min   Stress: Stress Concern Present (10/28/2024)    Received from [facility]    [employer] - Occupational Stress Questionnaire     Feeling of Stress : To some extent   Housing Stability: Unknown (10/28/2024)    Received from [facility]    Housing Stability Vital Sign     Unable to Pay for Housing in the Last Year: No       FAMILY HISTORY:  Family History   Problem Relation  Name Age of Onset    Heart attack Father      Diabetes Father      Hypertension Father      Diabetes Mother      Hypertension Mother      Breast cancer Maternal Aunt      Colon cancer Maternal Uncle      Breast cancer Daughter      Ovarian cancer Neg Hx      Cancer Neg Hx         ALLERGIES AND MEDICATIONS: updated and reviewed.  Review of patient's allergies indicates:   Allergen Reactions    Lortab [hydrocodone-acetaminophen] Itching    Promethazine Itching and Other (See Comments)    Albuterol      Other Reaction(s): CONFUSION     Current Outpatient Medications   Medication Sig Dispense Refill    amitriptyline (ELAVIL) 25 MG tablet Take 25-50 mg by mouth every evening.      apixaban (ELIQUIS) 5 mg Tab Take 1 tablet (5 mg total) by mouth 2 (two) times daily. 60 tablet 0    atogepant (QULIPTA) 60 mg Tab Take 60 mg by mouth once daily.      cholestyramine (QUESTRAN) 4 gram packet Take 1 packet (4 g total) by mouth every evening. Avoid taking other medications one hour before or 4 hours after taking this medication. Can interfere with absorption of other medications. 30 packet 0    clonazePAM (KLONOPIN) 0.5 MG tablet Take 0.25 mg by mouth every evening.      cyanocobalamin (VITAMIN B-12) 1000 MCG tablet Take 100 mcg by mouth once daily. Once every morning.      diclofenac sodium (VOLTAREN) 1 % Gel Apply 2 g topically 4 (four) times daily. 100 g 11    dicyclomine (BENTYL) 10 MG capsule Take 1 capsule (10 mg total) by mouth 3 (three) times daily as needed (abdominal cramping). 120 capsule 0    donepeziL (ARICEPT) 5 MG tablet Take 1 tablet by mouth every evening.      fluticasone furoate (ARNUITY ELLIPTA) 100 mcg/actuation inhaler Inhale 1 puff into the lungs once daily. Rinse mouth after each use. 30 each 11    hydrOXYzine pamoate (VISTARIL) 50 MG Cap Take 50 mg by mouth daily as needed.      lancets Misc Use to check blood glucose 4 times daily 100 each 5    levothyroxine (SYNTHROID) 75 MCG tablet Take 1 tablet (75 mcg  total) by mouth before breakfast. 90 tablet 1    magnesium oxide (MAG-OX) 400 mg (241.3 mg magnesium) tablet TAKE 1 TABLET BY MOUTH EVERY MORNING 90 tablet 0    metFORMIN (GLUCOPHAGE-XR) 500 MG ER 24hr tablet Take 1 tablet (500 mg total) by mouth once daily. 360 tablet 3    multivitamin Tab Take 1 tablet by mouth once daily. 30 tablet 0    naltrexone (DEPADE) 50 mg tablet Take 50 mg by mouth once daily.      omega-3 fatty acids/fish oil (FISH OIL-OMEGA-3 FATTY ACIDS) 300-1,000 mg capsule Take 2 capsules by mouth once daily. Take mornings and nights.      omeprazole (PRILOSEC) 20 MG capsule Take 1 capsule (20 mg total) by mouth once daily. 30 capsule 0    propranoloL (INDERAL) 20 MG tablet Take 20 mg by mouth 2 (two) times daily.      QUEtiapine (SEROQUEL) 50 MG tablet Take 50 mg by mouth every evening.      TURMERIC ORAL Take 250 mg by mouth every morning. TurmericXL      vortioxetine (TRINTELLIX) 5 mg Tab Take 5 mg by mouth every morning.      azithromycin (Z-DENVER) 250 MG tablet Take 2 tablets by mouth on day 1; Take 1 tablet by mouth on days 2-5 6 tablet 0    benzonatate (TESSALON) 100 MG capsule Take 2 capsules (200 mg total) by mouth 3 (three) times daily as needed for Cough. 30 capsule 0    EScitalopram oxalate (LEXAPRO) 10 MG tablet Take 10 mg by mouth once daily. (Patient not taking: Reported on 12/26/2024)      folic acid (FOLVITE) 1 MG tablet Take 1 tablet (1 mg total) by mouth once daily. 30 tablet 0    gabapentin (NEURONTIN) 300 MG capsule Take 1 capsule (300 mg total) by mouth 3 (three) times daily. (Patient taking differently: Take 400 mg by mouth 3 (three) times daily.) 90 capsule 0    ondansetron (ZOFRAN) 8 MG tablet Take 1 tablet (8 mg total) by mouth every 8 (eight) hours as needed for Nausea. 30 tablet 0    predniSONE (DELTASONE) 10 MG tablet Take 2 tablets (20 mg total) by mouth once daily for 2 days, THEN 1 tablet (10 mg total) once daily for 3 days. 7 tablet 0    tiotropium bromide (SPIRIVA  "RESPIMAT) 2.5 mcg/actuation inhaler Inhale 2 puffs into the lungs Daily. Controller 4 g 11    traZODone (DESYREL) 100 MG tablet Take 2 tablets (200 mg total) by mouth every evening. 60 tablet 0     No current facility-administered medications for this visit.     Facility-Administered Medications Ordered in Other Visits   Medication Dose Route Frequency Provider Last Rate Last Admin    albuterol sulfate nebulizer solution 2.5 mg  2.5 mg Nebulization Once Andrew Rodriguez MD         ROS  Review of Systems   Constitutional:  Positive for fatigue. Negative for diaphoresis and fever.   HENT:  Negative for congestion, rhinorrhea, sinus pressure, sinus pain and sore throat.    Respiratory:  Positive for cough, chest tightness and shortness of breath.    Cardiovascular:  Negative for chest pain.   Gastrointestinal:  Positive for nausea.   Musculoskeletal:  Positive for myalgias.   Psychiatric/Behavioral:  Positive for sleep disturbance.      PHYSICAL EXAM  Vitals:    12/26/24 1309   BP: 132/80   BP Location: Right arm   Patient Position: Sitting   Pulse: 77   Temp: 97.9 °F (36.6 °C)   TempSrc: Oral   SpO2: 95%   Weight: 92.5 kg (203 lb 14.8 oz)   Height: 5' 6" (1.676 m)    Body mass index is 32.91 kg/m².  Weight: 92.5 kg (203 lb 14.8 oz)   Height: 5' 6" (167.6 cm)     Physical Exam  Constitutional:       General: She is not in acute distress.     Appearance: She is obese. She is not ill-appearing, toxic-appearing or diaphoretic.   HENT:      Head: Normocephalic and atraumatic.      Nose: Nose normal. No congestion or rhinorrhea.      Mouth/Throat:      Mouth: Mucous membranes are moist.      Pharynx: Oropharynx is clear.   Eyes:      Extraocular Movements: Extraocular movements intact.      Conjunctiva/sclera: Conjunctivae normal.   Cardiovascular:      Rate and Rhythm: Normal rate and regular rhythm.      Pulses: Normal pulses.      Heart sounds: Normal heart sounds. No murmur heard.     No friction rub. No gallop. "   Pulmonary:      Effort: Pulmonary effort is normal.      Breath sounds: Wheezing and rhonchi present.      Comments: Rhonchi noted throughout all lung fields. Worse in RLL. Able to clear momentarily with coughing, slight wheeze in all lung fields noted afterwards. Some dullness to percussion of lower lung fields, resonant in upper fields.   Musculoskeletal:      Cervical back: Normal range of motion.   Skin:     General: Skin is warm and dry.      Capillary Refill: Capillary refill takes less than 2 seconds.      Coloration: Skin is not jaundiced or pale.   Neurological:      General: No focal deficit present.      Mental Status: She is alert. Mental status is at baseline.   Psychiatric:         Mood and Affect: Mood normal.         Behavior: Behavior normal.         Thought Content: Thought content normal.         Judgment: Judgment normal.         Health Maintenance         Date Due Completion Date    Shingles Vaccine (1 of 2) Never done ---    RSV Vaccine (Age 60+ and Pregnant patients) (1 - Risk 60-74 years 1-dose series) Never done ---    Pneumococcal Vaccines (Age 50+) (2 of 2 - PPSV23) 12/22/2019 10/27/2019    Diabetes Urine Screening 03/10/2023 3/10/2022    Foot Exam 03/10/2023 3/10/2022    Eye Exam 05/23/2023 5/23/2022    Lipid Panel 04/06/2024 4/6/2023    Influenza Vaccine (1) 09/01/2024 10/27/2019    COVID-19 Vaccine (4 - 2024-25 season) 09/01/2024 4/22/2022    Hemoglobin A1c 12/15/2024 6/15/2024    DEXA Scan 05/24/2025 5/24/2021    Colorectal Cancer Screening 09/09/2025 9/9/2020    TETANUS VACCINE 07/09/2030 7/9/2020          Imaging  Chest Xray PA and lateral with comparison 10/22/2024 and 4/7/2024 -   FINDINGS:  Lungs are well expanded.  No acute consolidation, pleural effusion, or pneumothorax seen.     Cardiac silhouette is normal in size.     Surgical clips are present along both breasts.     Impression:     No acute cardiopulmonary process seen.    Assessment & Plan    Earl was seen today for  acute URI and possible COPD exacerbation.    Diagnoses and all orders for this visit:    Acute URI  Chronic obstructive pulmonary disease, unspecified COPD type  Patient presents with findings and history consistent with postviral cough and COPD exacerbation. She has not been using her inhalers as prescribed by pulmonology. She does not have concerning symptoms warranting inpatient treatment or evaluation at an ER. Gave her precautions and called her  to inform him of treatment changes and precautions. If she begins to have worsening shortness of breath, decreased oxygenation, or increased O2 requirements, go to the ER/urgent care. She should follow with pulmonologist to discuss this exacerbation and continued treatment plan.   -     POCT Influenza A/B Molecular  -     POCT COVID-19 Rapid Screening  -     benzonatate (TESSALON) 100 MG capsule; Take 2 capsules (200 mg total) by mouth 3 (three) times daily as needed for Cough.  -     X-Ray Chest PA And Lateral; Future  -     Cancel: NEBULIZER FOR HOME USE  -     azithromycin (Z-DENVER) 250 MG tablet; Take 2 tablets by mouth on day 1; Take 1 tablet by mouth on days 2-5  -     tiotropium bromide (SPIRIVA RESPIMAT) 2.5 mcg/actuation inhaler; Inhale 2 puffs into the lungs Daily. Controller  -     predniSONE (DELTASONE) 10 MG tablet; Take 2 tablets (20 mg total) by mouth once daily for 2 days, THEN 1 tablet (10 mg total) once daily for 3 days.    Other orders  -     ondansetron (ZOFRAN) 8 MG tablet; Take 1 tablet (8 mg total) by mouth every 8 (eight) hours as needed for Nausea.  -     Discontinue: albuterol sulfate nebulizer solution 2.5 mg  The following orders have not been finalized:  -     Cancel: albuterol (ACCUNEB) 1.25 mg/3 mL Nebu        Follow-up: No follow-ups on file.    Arturo Joseph        Medication List with Changes/Refills   New Medications    AZITHROMYCIN (Z-DENVER) 250 MG TABLET    Take 2 tablets by mouth on day 1; Take 1 tablet by mouth on days  2-5    BENZONATATE (TESSALON) 100 MG CAPSULE    Take 2 capsules (200 mg total) by mouth 3 (three) times daily as needed for Cough.    PREDNISONE (DELTASONE) 10 MG TABLET    Take 2 tablets (20 mg total) by mouth once daily for 2 days, THEN 1 tablet (10 mg total) once daily for 3 days.    TIOTROPIUM BROMIDE (SPIRIVA RESPIMAT) 2.5 MCG/ACTUATION INHALER    Inhale 2 puffs into the lungs Daily. Controller   Current Medications    AMITRIPTYLINE (ELAVIL) 25 MG TABLET    Take 25-50 mg by mouth every evening.    APIXABAN (ELIQUIS) 5 MG TAB    Take 1 tablet (5 mg total) by mouth 2 (two) times daily.    ATOGEPANT (QULIPTA) 60 MG TAB    Take 60 mg by mouth once daily.    CHOLESTYRAMINE (QUESTRAN) 4 GRAM PACKET    Take 1 packet (4 g total) by mouth every evening. Avoid taking other medications one hour before or 4 hours after taking this medication. Can interfere with absorption of other medications.    CLONAZEPAM (KLONOPIN) 0.5 MG TABLET    Take 0.25 mg by mouth every evening.    CYANOCOBALAMIN (VITAMIN B-12) 1000 MCG TABLET    Take 100 mcg by mouth once daily. Once every morning.    DICLOFENAC SODIUM (VOLTAREN) 1 % GEL    Apply 2 g topically 4 (four) times daily.    DICYCLOMINE (BENTYL) 10 MG CAPSULE    Take 1 capsule (10 mg total) by mouth 3 (three) times daily as needed (abdominal cramping).    DONEPEZIL (ARICEPT) 5 MG TABLET    Take 1 tablet by mouth every evening.    ESCITALOPRAM OXALATE (LEXAPRO) 10 MG TABLET    Take 10 mg by mouth once daily.    FLUTICASONE FUROATE (ARNUITY ELLIPTA) 100 MCG/ACTUATION INHALER    Inhale 1 puff into the lungs once daily. Rinse mouth after each use.    FOLIC ACID (FOLVITE) 1 MG TABLET    Take 1 tablet (1 mg total) by mouth once daily.    GABAPENTIN (NEURONTIN) 300 MG CAPSULE    Take 1 capsule (300 mg total) by mouth 3 (three) times daily.    HYDROXYZINE PAMOATE (VISTARIL) 50 MG CAP    Take 50 mg by mouth daily as needed.    LANCETS MISC    Use to check blood glucose 4 times daily     LEVOTHYROXINE (SYNTHROID) 75 MCG TABLET    Take 1 tablet (75 mcg total) by mouth before breakfast.    MAGNESIUM OXIDE (MAG-OX) 400 MG (241.3 MG MAGNESIUM) TABLET    TAKE 1 TABLET BY MOUTH EVERY MORNING    METFORMIN (GLUCOPHAGE-XR) 500 MG ER 24HR TABLET    Take 1 tablet (500 mg total) by mouth once daily.    MULTIVITAMIN TAB    Take 1 tablet by mouth once daily.    NALTREXONE (DEPADE) 50 MG TABLET    Take 50 mg by mouth once daily.    OMEGA-3 FATTY ACIDS/FISH OIL (FISH OIL-OMEGA-3 FATTY ACIDS) 300-1,000 MG CAPSULE    Take 2 capsules by mouth once daily. Take mornings and nights.    OMEPRAZOLE (PRILOSEC) 20 MG CAPSULE    Take 1 capsule (20 mg total) by mouth once daily.    PROPRANOLOL (INDERAL) 20 MG TABLET    Take 20 mg by mouth 2 (two) times daily.    QUETIAPINE (SEROQUEL) 50 MG TABLET    Take 50 mg by mouth every evening.    TRAZODONE (DESYREL) 100 MG TABLET    Take 2 tablets (200 mg total) by mouth every evening.    TURMERIC ORAL    Take 250 mg by mouth every morning. TurmericXL    VORTIOXETINE (TRINTELLIX) 5 MG TAB    Take 5 mg by mouth every morning.   Changed and/or Refilled Medications    Modified Medication Previous Medication    ONDANSETRON (ZOFRAN) 8 MG TABLET ondansetron (ZOFRAN) 4 MG tablet       Take 1 tablet (8 mg total) by mouth every 8 (eight) hours as needed for Nausea.    TAKE 1 TABLET(4 MG) BY MOUTH DAILY AS NEEDED FOR NAUSEA

## 2024-12-26 NOTE — TELEPHONE ENCOUNTER
----- Message from Adelina sent at 12/26/2024 10:26 AM CST -----  Regarding: bronchitis  Type:  Patient Returning Call      Name of who is calling:pt        What is request in detail:patient is requesting a call back in regards to medication. Patient wants to know if something can be prescribed for bronchitis, she is coughing up green mucus. If possible please send to:  [id number] - [address] - [address] AT [facility] [address] [facility]   Phone: [phone]  Fax: [phone]            Can clinic reply by [facility]:no        What [facility] [phone]

## 2024-12-26 NOTE — Clinical Note
Hello, I saw this patient of Dr. Jones today for acute cough with increased sputum production. Flu/COVID negative, chest xray negative. They have not been using inhalers prescribed and restarted Spiriva. Treated with symptomatic management, azithromycin, and prednisone for possible COPD exacerbation. Gave clear return precautions. Please reach out to patient for follow up.

## 2024-12-26 NOTE — PATIENT INSTRUCTIONS
Thank you for coming to our clinic today. Hopefully we can help you feel better.   I want to list the many things we discussed and items to watch for.   Please restart using your inhalers Ellipta and Spiriva. These are very important for maintenance of your COPD and to prevent any exacerbations in the future.   For now, please use the Tessalon perles 3 times daily as needed for cough. You can continue to use mucinex DM.   Also, please take the azithromycin and prednisone. This should help if this a COPD exacerbation.   Stop smoking if you can as this will worsen your symptoms.   Lastly, please see your pulmonologist for follow up.   If you begin to have any worsening symptoms (difficulty breathing, reduced oxygen on your pulse oximeter, needing more oxygen at home, fever) please go to the urgent care of ER.   Do not hesitate to reach out with any questions or concerns.

## 2024-12-26 NOTE — TELEPHONE ENCOUNTER
Spoke with patient and scheduled appointment below:    Appointment Information   Name: MARTY SALDAÑA MRN: [id number]   Date: 12/26/2024 Status: [facility]   Appt Time: 1:00 PM Length: 30   Visit Type: EP - PRIMARY CARE ([facility]) [486] Copay: $0.00   Provider: Arturo Joseph PA-C Dept: [facility], Internal Medicine       Dept Address: [address] [place] [id number]       Dept Phone Number: [phone]   Referring Provider:   NEENA: [id number]   Appt Phone:     Notes: URI symptoms   Made On: 12/26/2024 11:51 AM By: ERIN LAGUNA [[id number]] (ES)

## 2024-12-27 ENCOUNTER — PATIENT MESSAGE (OUTPATIENT)
Dept: INTERNAL MEDICINE | Facility: CLINIC | Age: 64
End: 2024-12-27
Payer: COMMERCIAL

## 2024-12-27 ENCOUNTER — TELEPHONE (OUTPATIENT)
Dept: INTERNAL MEDICINE | Facility: CLINIC | Age: 64
End: 2024-12-27
Payer: COMMERCIAL

## 2024-12-27 ENCOUNTER — PATIENT MESSAGE (OUTPATIENT)
Dept: GASTROENTEROLOGY | Facility: CLINIC | Age: 64
End: 2024-12-27
Payer: COMMERCIAL

## 2024-12-27 ENCOUNTER — TELEPHONE (OUTPATIENT)
Dept: PULMONOLOGY | Facility: CLINIC | Age: 64
End: 2024-12-27
Payer: COMMERCIAL

## 2024-12-27 DIAGNOSIS — R11.0 NAUSEA: Primary | ICD-10-CM

## 2024-12-27 NOTE — TELEPHONE ENCOUNTER
Called patient and her  this morning.   States that after taking 2 250 mg tablets of azithromycin last night, she has had diarrhea every half hour. States it is fully liquid with no chunks. She did not start the prednisone due to concerns with stimulation taking it late at night.    also reports having food poisoning and vomiting/diarrhea as well.   Her breathing and cough have improved, with no concerns about that now.   She has not been able to  her inhalers yet.   She had recent C. Diff infection that has been tested negative by PCR about 1 week ago.   Due to this history and improvement in respiratory symptoms, advised her to discontinue azithromycin and focus on adequate oral liquid intake with water and electrolytes. Also recommended eating foods better for diarrhea including potatoes, rice, bananas, yogurt, etc.   We discussed precautions to seek immediate care at the ER if she begins to experience dizziness or is unable to maintain oral intake. If symptoms do worsen, I advised them to not hesitate to reach out as well.

## 2024-12-27 NOTE — TELEPHONE ENCOUNTER
----- Message from Susan sent at 12/26/2024  4:49 PM CST -----  Regarding: Missed call  Type:  Patient Returning Call         Who Called:  PT         Who Left Message for Patient:          Does the patient know what this is regarding?: no          Would the patient rather a call back or a response via [facility]?  Call back          Best Call Back Number:[phone]         Additional Information:Pt was seen today by Arturo PATE

## 2024-12-27 NOTE — TELEPHONE ENCOUNTER
LVM for patient to let her know appointment scheduled for follow up with Dr Jones on 1/8/25 at 8am per Dr Jones. Appointment mailed. Patient to call back if she has any questions.

## 2024-12-27 NOTE — TELEPHONE ENCOUNTER
"Pt msg,      "I started taking the antibiotic you prescribed last night and throughout the night I had diarrhea every half hour two hour. You know I have CDF and I dont know if this antibiotic is a problem? Please let me know.  Earl"    Please advise.   "

## 2025-01-01 NOTE — DISCHARGE SUMMARY
[facility] [address] Medicine  Discharge Summary      Patient Name: Earl Abdul  MRN: [id number]  IZA: [id number]  Patient Class: IP- Inpatient  Admission Date: 9/16/2023  Hospital Length of Stay: 1 days  Discharge Date and Time:  09/17/2023 3:49 PM  Attending Physician: Kalyan Fisher MD   Discharging Provider: Kalyan Eisenberg MD  Primary Care Provider: Andrew Rodriguez MD    Primary Care Team: Networked reference to record PCT     HPI:   Patient is a difficult historian, doesn't want to engage much, doesn't remember her meds, alert and oriented.   Per NP Arnaldo -     According to ED provider note:  Earl Abdul is a 63 year old lady with hx of alcohol use disorder with hx of prior seizures, alcohol withdrawal, depression with suicide attempt 2017, non-insulin dependent DM, COPD, GERD, fibromyalgia, sarcoidosis, breast Ca, CLL (no current treatment), HTN, HLD, hypothyroidism presenting to ED by EMS for alcohol intoxication. Per patient's son, patient has been binge drinking for the past 4 days after recent alcohol rehab in [place] for 1 month. Patient's son reports that he checked on her throughout the day and she had gotten progressively intoxicated. Patient reports drinking vodka today but unable to say how much. Patient also reports fall (unknown when) with knee pain. Patient also reports a cough with SOB.     Afebrile, , RR 17, /76, Pox 96% on RA, then placed on 2L/min NC for slight desaturation to 95%. Covid and Flu negative. Leukocytosis 48K, lactate 3.1, procal normal. H/H 11.9/38.1 (baseline), Plt 89 (baseline thrombocytopenia), Alb WNL. K 5.4, Na 137, AG 24, Cr 0.8, Glucose 57. BNP WNL, Trop I 0.033. UDS positive for alcohol only. UA with no nitrites, leuks. EKG with sinus tachycardia. CXR with no acute abnormality. CTA Chest with no PE, but some lung base opacities and atelectasis possibly related to nonspecific pneumonitis. See full report. In ED:  "Patient was given 2.4L NaCl 0.9% fluid bolus in total, thiamine 500mg IV, folic acid 1mg IV, ondansetron 4mg IV x 2, ketoroalc 10mg IV x 1, lorazepam 2mg IV, diazepam 5mg IV x 2. Ceftriaxone 2g IV and azithromycin 500mg IV was also given to cover for pneumonia.     Patient replies "alcohol" when asked why she is in the hospital. Difficult historian, does not volunteer information, gives "yes, no" answers. Says no to SOB and cough but Pox 93% and was obviously coughing. Probed about fall, she reports that she fell down the stairs, did not know when, declined HI, declined any other injuries (patient ambulated to bathroom". Denies fever, chills, dysuria, abdominal pain, nausea, vomiting. Has resting tremors.     Patient is admitted to Hospital Medicine for management of alcohol intoxication, risk for withdrawal and sepsis likely due to respiratory source.         Hospital Course:   Pt doing well this AM on room air. Improvement in WBC could be 2/2 to fluids or abx. With neg procal and no infectious sx like fever less likely to be leukocytosis in setting of infection, never the less on admission she did have reported cough and tachycardia along with leukocytosis so will continue antibiotics for now.     Regarding fall down stairs, pt is unclear about the details and if she hit head or not so will order CT brain.     Pt is unclear about her home meds - I tried to call her pharmacy but they are closed on a Sunday. Will reattempt tomorrow. In the meantime, will start some blood pressure medications listed on home meds on the chart as she is hypertensive.    In terms of alcohol, she has been sober for 60 days after going to a rehab and started drinking vodka heavily 4 days because she was lonely and sad because her  left on a business trip. Her son lives in the area and she asked him to take her to hospital because she felt she was drinking too much. She thinks she can abstain when she goes home and does not want to " go back to a rehab at this time. On physical exma she is not currently withdrawing.     Her anion gap and acidosis has improved with the fluids. Will continue to fluid resus her, monitor for signs of withdrawal (though with 4 day drinking hx I doubt she will withdraw), be eval by PT and OT and plan for d/c tomrorow if she remains stable.     She was eval by PT and OT with no needs. Pt is ready to leave even if she has to leave AMA - says she feels better. I evaluated pt. She has capacity. She is stable from my point of view and discussed about alcohol cessation at length. She says she feels confident she can go home and abstain from drinking. She does not feel like she is withdrawing and she does not have physical signs of withdrawal. She was advised to eat and drink well to which she expressed understanding. She will also go home with 3 more days of abx.  Also she will follow up with PCP.        Goals of Care Treatment Preferences:  Code Status: Full Code    Health care agent: Spouse is NOK  Health care agent number: No value filed.          What is most important right now is to focus on curative/life-prolongation (regardless of treatment burdens).  Accordingly, we have decided that the best plan to meet the patient's goals includes continuing with treatment.      Consults:   Consults (From admission, onward)        Status Ordering Provider     Inpatient consult to Midline team  Once        Provider:  (Not yet assigned)    Acknowledged SHERRY LEARY          No new Assessment & Plan notes have been filed under this hospital service since the last note was generated.  Service: Hospital Medicine    Final Active Diagnoses:    Diagnosis Date Noted POA    PRINCIPAL PROBLEM:  Alcohol use disorder, severe, dependence [F10.20] 02/07/2014 Yes     Chronic    COPD (chronic obstructive pulmonary disease) [J44.9] 04/17/2021 Yes    Essential hypertension [I10] 02/07/2014 Yes    Type 2 diabetes mellitus with hypoglycemia  [E11.649] 11/30/2021 Yes    Fibromyalgia [M79.7] 02/07/2014 Yes    Hyperlipidemia [E78.5] 11/30/2012 Yes    Sarcoidosis [D86.9] 03/31/2011 Yes      Problems Resolved During this Admission:    Diagnosis Date Noted Date Resolved POA    Sepsis [A41.9] 10/14/2014 09/17/2023 Yes    Acute alcoholic intoxication with complication [F10.929] 09/17/2023 09/17/2023 Yes    Alcoholic ketoacidosis [E87.29] 04/29/2022 09/17/2023 Yes    Chronic respiratory failure with hypoxia [J96.11] 01/08/2022 09/17/2023 Yes     Chronic    Alcohol withdrawal syndrome with complication [F10.939] 02/07/2014 09/17/2023 Yes       Discharged Condition: good    Disposition: Home or Self Care    Follow Up:   Follow-up Information     Andrew Rodriguez MD Follow up.    Specialty: Internal Medicine  Contact information:  [address] [place] [id number]  [phone]                       Patient Instructions:   Please abstain from alcohol, you have come so far! You can do it! - you have resources if you feel you are unable to do so   Please take 3 more days of antibiotics  Please follow up with your PCP   When you see your PCP, please make a list of the medications you take at home as you do not know them    Medications:  Reconciled Home Medications:      Medication List      START taking these medications    amoxicillin-clavulanate 875-125mg 875-125 mg per tablet  Commonly known as: AUGMENTIN  Take 1 tablet by mouth 2 (two) times daily. for 3 days        CONTINUE taking these medications    acetaminophen 500 MG tablet  Commonly known as: TYLENOL  Take 500-1,500 mg by mouth daily as needed for Pain.     albuterol 90 mcg/actuation inhaler  Commonly known as: PROVENTIL/VENTOLIN HFA     anastrozole 1 mg Tab  Commonly known as: ARIMIDEX     * ARIPiprazole 10 MG Tab  Commonly known as: ABILIFY  Take 1 tablet by mouth once daily.     * ARIPiprazole 2 MG Tab  Commonly known as: ABILIFY  Take 1 tablet by mouth once daily.     *  ARIPiprazole 5 MG Tab  Commonly known as: ABILIFY  Take 1 tablet by mouth every morning.     atorvastatin 10 MG tablet  Commonly known as: LIPITOR  Take 1 tablet by mouth once daily.     ATROVENT HFA 17 mcg/actuation inhaler  Generic drug: ipratropium  Inhale 2 puffs into the lungs every 6 (six) hours as needed.     BREO ELLIPTA 100-25 mcg/dose diskus inhaler  Generic drug: fluticasone furoate-vilanteroL  Inhale 1 puff into the lungs once daily. Controller     busPIRone 15 MG tablet  Commonly known as: BUSPAR     butalbital-acetaminophen-caffeine -40 mg -40 mg per tablet  Commonly known as: FIORICET, ESGIC  Take 1 tablet by mouth every 4 (four) hours as needed.     cholecalciferol (vitamin D3) 50 mcg (2,000 unit) Cap capsule  Commonly known as: VITAMIN D3  TAKE 1 CAPSULE BY MOUTH ONCE A DAY IN THE MORNING     cloNIDine 0.1 MG tablet  Commonly known as: CATAPRES  Take by mouth.     diazePAM 5 MG tablet  Commonly known as: VALIUM  Take 2 tablets (10 mg total) by mouth every 8 (eight) hours for 3 days, THEN 2 tablets (10 mg total) every 12 (twelve) hours for 3 days, THEN 1 tablet (5 mg total) every 12 (twelve) hours for 3 days, THEN 1 tablet (5 mg total) once daily for 3 days, THEN 0.5 tablets (2.5 mg total) once daily for 3 days.  Start taking on: July 21, 2023     dicyclomine 20 mg tablet  Commonly known as: BENTYL  Take 20 mg by mouth 4 (four) times daily.     doxepin 150 MG Cap  Commonly known as: SINEQUAN  Take 150 mg by mouth every evening.     DULoxetine 60 MG capsule  Commonly known as: CYMBALTA  Take 60 mg by mouth.     EScitalopram oxalate 10 MG tablet  Commonly known as: LEXAPRO  Take 10 mg by mouth once daily.     FLUoxetine 20 MG capsule  Take 3 capsules by mouth once daily.     folic acid 1 MG tablet  Commonly known as: FOLVITE  Take 1 tablet (1 mg total) by mouth once daily.     gabapentin 600 MG tablet  Commonly known as: NEURONTIN  Take 1 tablet (600 mg total) by mouth 3 (three) times  daily. for 10 days     hydroCHLOROthiazide 25 MG tablet  Commonly known as: HYDRODIURIL     hydrOXYzine pamoate 50 MG Cap  Commonly known as: VISTARIL  TAKE ONE CAPSULE BY MOUTH EVERY 6 HOURS AS NEEDED FOR ANXIETY AND/OR EVERY NIGHT AT BEDTIME AS NEEDED FOR INSOMNIA     letrozole 2.5 mg Tab  Commonly known as: FEMARA     levothyroxine 50 MCG tablet  Commonly known as: SYNTHROID  TAKE 1 TABLET(50 MCG) BY MOUTH BEFORE BREAKFAST     lisinopriL 20 MG tablet  Commonly known as: PRINIVIL,ZESTRIL  Take 1 tablet (20 mg total) by mouth once daily.     loperamide 2 mg capsule  Commonly known as: IMODIUM  Take 2 mg by mouth 4 (four) times daily as needed for Diarrhea (Per package directions).     losartan 25 MG tablet  Commonly known as: COZAAR  Take 1 tablet by mouth once daily.     melatonin  Commonly known as: MELATIN  Take by mouth nightly as needed for Insomnia.     metFORMIN 500 MG ER 24hr tablet  Commonly known as: GLUCOPHAGE-XR  Take 2 tablets (1,000 mg total) by mouth 2 (two) times daily with meals.     metoprolol succinate 50 MG 24 hr tablet  Commonly known as: TOPROL-XL     mirtazapine 30 MG tablet  Commonly known as: REMERON     multivitamin Tab  Take 1 tablet by mouth once daily.     nabumetone 500 MG tablet  Commonly known as: RELAFEN  Take 500 mg by mouth 2 (two) times daily.     ondansetron 4 MG Tbdl  Commonly known as: ZOFRAN-ODT  Take 1 tablet (4 mg total) by mouth every 6 (six) hours as needed (nausea/vomiting).     pantoprazole 40 MG tablet  Commonly known as: PROTONIX  Take 40 mg by mouth once daily.     propranoloL 20 MG tablet  Commonly known as: INDERAL  Take 20 mg by mouth 2 (two) times daily.     thiamine 100 MG tablet  Take 1 tablet (100 mg total) by mouth once daily.     traZODone 300 MG tablet  Commonly known as: DESYREL  Take 1 tablet by mouth every evening.         * This list has 3 medication(s) that are the same as other medications prescribed for you. Read the directions carefully, and ask  your doctor or other care provider to review them with you.                    Time spent on the discharge of patient: 35 minutes         Kalyan Eisenberg MD  Department of Hospital Medicine  [facility] ([address])   Yes

## 2025-01-03 RX ORDER — SCOLOPAMINE TRANSDERMAL SYSTEM 1 MG/1
1 PATCH, EXTENDED RELEASE TRANSDERMAL
Qty: 7 PATCH | Refills: 0 | Status: SHIPPED | OUTPATIENT
Start: 2025-01-03

## 2025-01-05 ENCOUNTER — PATIENT MESSAGE (OUTPATIENT)
Dept: INTERNAL MEDICINE | Facility: CLINIC | Age: 65
End: 2025-01-05
Payer: COMMERCIAL

## 2025-01-06 ENCOUNTER — TELEPHONE (OUTPATIENT)
Dept: PULMONOLOGY | Facility: CLINIC | Age: 65
End: 2025-01-06
Payer: COMMERCIAL

## 2025-01-06 NOTE — TELEPHONE ENCOUNTER
Refill Routing Note   Medication(s) are not appropriate for processing by [facility] for the following reason(s):        Outside of protocol    [facility] action(s):  Route               Appointments  past 12m or future 3m with PCP    Date Provider   Last Visit   11/14/2024 Andrew Rodriguez MD   Next Visit   Visit date not found Andrew Rodriguez MD   ED visits in past 90 days: 1        Note composed:9:40 AM 01/06/2025

## 2025-01-06 NOTE — TELEPHONE ENCOUNTER
----- Message from Kyle sent at 1/6/2025  8:59 AM CST -----  Consult/Advisory    Name Of Caller: Henrique       Contact Preference:[phone]    Nature of call: Pt needs to reschedule no appts showing avail please call to Henrique to assist

## 2025-01-07 ENCOUNTER — TELEPHONE (OUTPATIENT)
Dept: GASTROENTEROLOGY | Facility: CLINIC | Age: 65
End: 2025-01-07
Payer: COMMERCIAL

## 2025-01-07 NOTE — TELEPHONE ENCOUNTER
----- Message from Meg sent at 1/7/2025 12:59 PM CST -----  Regarding: patient call back  Type: Patient Call Back    Who called: Self     What is the request in detail: confused about directions and asked for a call back to go over them     Can the clinic reply by [facility]? No     Would the patient rather a call back or a response via [facility]? Call     Best call back number: .[phone]      Additional Information:

## 2025-01-08 ENCOUNTER — TELEPHONE (OUTPATIENT)
Dept: GASTROENTEROLOGY | Facility: CLINIC | Age: 65
End: 2025-01-08
Payer: COMMERCIAL

## 2025-01-08 NOTE — TELEPHONE ENCOUNTER
----- Message from Eva sent at 1/8/2025  3:25 PM CST -----  Regarding: Call needed today  Contact: [phone]      Who called: Pt's        Reason:Called to ask if the pt could drink a diet coke? Pls call ChantellNitoy (Spouse)  [phone] (         Provider's name:Dr. Valente      Additional Information: Thank you.

## 2025-01-09 ENCOUNTER — ANESTHESIA (OUTPATIENT)
Dept: ENDOSCOPY | Facility: HOSPITAL | Age: 65
End: 2025-01-09
Payer: COMMERCIAL

## 2025-01-09 ENCOUNTER — ANESTHESIA EVENT (OUTPATIENT)
Dept: ENDOSCOPY | Facility: HOSPITAL | Age: 65
End: 2025-01-09
Payer: COMMERCIAL

## 2025-01-09 ENCOUNTER — HOSPITAL ENCOUNTER (OUTPATIENT)
Facility: HOSPITAL | Age: 65
Discharge: HOME | End: 2025-01-09
Attending: INTERNAL MEDICINE | Admitting: INTERNAL MEDICINE
Payer: COMMERCIAL

## 2025-01-09 VITALS
RESPIRATION RATE: 20 BRPM | HEIGHT: 66 IN | HEART RATE: 64 BPM | OXYGEN SATURATION: 97 % | TEMPERATURE: 98 F | SYSTOLIC BLOOD PRESSURE: 176 MMHG | DIASTOLIC BLOOD PRESSURE: 79 MMHG | BODY MASS INDEX: 30.53 KG/M2 | WEIGHT: 190 LBS

## 2025-01-09 DIAGNOSIS — Z86.0100 HISTORY OF COLON POLYPS: Primary | ICD-10-CM

## 2025-01-09 DIAGNOSIS — Z12.11 SCREEN FOR COLON CANCER: ICD-10-CM

## 2025-01-09 DIAGNOSIS — J96.11 CHRONIC HYPOXEMIC RESPIRATORY FAILURE: ICD-10-CM

## 2025-01-09 LAB
POCT GLUCOSE: 112 MG/DL (ref 70–110)
POCT GLUCOSE: 119 MG/DL (ref 70–110)

## 2025-01-09 PROCEDURE — 27201012 HC FORCEPS, HOT/COLD, DISP: Performed by: INTERNAL MEDICINE

## 2025-01-09 PROCEDURE — 43239 EGD BIOPSY SINGLE/MULTIPLE: CPT | Mod: 51,,, | Performed by: INTERNAL MEDICINE

## 2025-01-09 PROCEDURE — 63600175 PHARM REV CODE 636 W HCPCS: Performed by: NURSE ANESTHETIST, CERTIFIED REGISTERED

## 2025-01-09 PROCEDURE — 88305 TISSUE EXAM BY PATHOLOGIST: CPT | Mod: 26,,, | Performed by: PATHOLOGY

## 2025-01-09 PROCEDURE — 37000008 HC ANESTHESIA 1ST 15 MINUTES: Performed by: INTERNAL MEDICINE

## 2025-01-09 PROCEDURE — 88305 TISSUE EXAM BY PATHOLOGIST: CPT | Mod: 59 | Performed by: PATHOLOGY

## 2025-01-09 PROCEDURE — 82962 GLUCOSE BLOOD TEST: CPT | Performed by: INTERNAL MEDICINE

## 2025-01-09 PROCEDURE — 88342 IMHCHEM/IMCYTCHM 1ST ANTB: CPT | Mod: 26,,, | Performed by: PATHOLOGY

## 2025-01-09 PROCEDURE — 43239 EGD BIOPSY SINGLE/MULTIPLE: CPT | Performed by: INTERNAL MEDICINE

## 2025-01-09 PROCEDURE — 37000009 HC ANESTHESIA EA ADD 15 MINS: Performed by: INTERNAL MEDICINE

## 2025-01-09 PROCEDURE — 25000003 PHARM REV CODE 250: Performed by: NURSE ANESTHETIST, CERTIFIED REGISTERED

## 2025-01-09 PROCEDURE — 45380 COLONOSCOPY AND BIOPSY: CPT | Mod: 33 | Performed by: INTERNAL MEDICINE

## 2025-01-09 PROCEDURE — 88342 IMHCHEM/IMCYTCHM 1ST ANTB: CPT | Performed by: PATHOLOGY

## 2025-01-09 PROCEDURE — 45380 COLONOSCOPY AND BIOPSY: CPT | Mod: 33,,, | Performed by: INTERNAL MEDICINE

## 2025-01-09 RX ORDER — GLUCAGON 1 MG
1 KIT INJECTION
Status: DISCONTINUED | OUTPATIENT
Start: 2025-01-09 | End: 2025-01-09 | Stop reason: HOSPADM

## 2025-01-09 RX ORDER — SODIUM CHLORIDE 9 MG/ML
INJECTION, SOLUTION INTRAVENOUS CONTINUOUS
Status: DISCONTINUED | OUTPATIENT
Start: 2025-01-09 | End: 2025-01-09 | Stop reason: HOSPADM

## 2025-01-09 RX ORDER — ONDANSETRON HYDROCHLORIDE 2 MG/ML
INJECTION, SOLUTION INTRAVENOUS
Status: DISCONTINUED | OUTPATIENT
Start: 2025-01-09 | End: 2025-01-09

## 2025-01-09 RX ORDER — LIDOCAINE HYDROCHLORIDE 20 MG/ML
INJECTION INTRAVENOUS
Status: DISCONTINUED | OUTPATIENT
Start: 2025-01-09 | End: 2025-01-09

## 2025-01-09 RX ORDER — DEXMEDETOMIDINE HYDROCHLORIDE 100 UG/ML
INJECTION, SOLUTION INTRAVENOUS
Status: DISCONTINUED | OUTPATIENT
Start: 2025-01-09 | End: 2025-01-09

## 2025-01-09 RX ORDER — SODIUM CHLORIDE 0.9 % (FLUSH) 0.9 %
3 SYRINGE (ML) INJECTION
Status: DISCONTINUED | OUTPATIENT
Start: 2025-01-09 | End: 2025-01-09 | Stop reason: HOSPADM

## 2025-01-09 RX ORDER — PROPOFOL 10 MG/ML
VIAL (ML) INTRAVENOUS CONTINUOUS PRN
Status: DISCONTINUED | OUTPATIENT
Start: 2025-01-09 | End: 2025-01-09

## 2025-01-09 RX ORDER — PROPOFOL 10 MG/ML
VIAL (ML) INTRAVENOUS
Status: DISCONTINUED | OUTPATIENT
Start: 2025-01-09 | End: 2025-01-09

## 2025-01-09 RX ADMIN — PROPOFOL 125 MCG/KG/MIN: 10 INJECTION, EMULSION INTRAVENOUS at 01:01

## 2025-01-09 RX ADMIN — ONDANSETRON 4 MG: 2 INJECTION INTRAMUSCULAR; INTRAVENOUS at 01:01

## 2025-01-09 RX ADMIN — LIDOCAINE HYDROCHLORIDE 80 MG: 20 INJECTION INTRAVENOUS at 01:01

## 2025-01-09 RX ADMIN — PROPOFOL 30 MG: 10 INJECTION, EMULSION INTRAVENOUS at 01:01

## 2025-01-09 RX ADMIN — DEXMEDETOMIDINE 8 MCG: 100 INJECTION, SOLUTION, CONCENTRATE INTRAVENOUS at 01:01

## 2025-01-09 RX ADMIN — DEXMEDETOMIDINE 4 MCG: 100 INJECTION, SOLUTION, CONCENTRATE INTRAVENOUS at 01:01

## 2025-01-09 RX ADMIN — PROPOFOL 50 MG: 10 INJECTION, EMULSION INTRAVENOUS at 01:01

## 2025-01-09 NOTE — H&P
"[facility] - Emergency Dept  [facility] Medicine  History & Physical    Patient Name: Earl Abdul  MRN: [id number]  Patient Class: IP- Inpatient  Admission Date: 9/26/2022  Attending Physician: Aspen Church MD   Primary Care Provider: Andrew Rodriguez MD         Patient information was obtained from patient, past medical records and ER records.     Subjective:     Principal Problem:Alcohol withdrawal    Chief Complaint:   Chief Complaint   Patient presents with    Flu-Like Symptoms     84% RA. 97% 3L        HPI: 61 y/o F with a PMHx of CLL/MBCL, sarcoidosis, T2DM, COPD on QHS O2, HLD, HTN and migraines presents to the ED with alcohol withdrawal and flu-like symptoms. Patient reports that she had a bad week and had been drinking, last drink around lunchtime 9/26. She had 3 drinks 9/26 prior to presentation. She is tremulous and reports a headache, but states that it is similar to her migraines. She reports that her  recently had a "bad cold," and she has had similar symptoms over about 1 week including increased cough and SOB, chills, and muscle aches. She states that when she coughs very hard she gags and spits up phlegm. She also reports decreased PO intake.    In the ED, she was given 2mg lorazepam x1 for CIWA 21, tremulous and agitated. Tylenol for T101. CBC remarkable for WBC 27, H/H 11.7/37.2, plt 103. CMP remarkable for CO2 12, anion gap 31, AST 70, ALT 51. Serum alcohol level 165. Lactic acid pending. Patient given 1L LR and 1x vanc/zosyn in ED. Admitted to medicine for mangement of alcohol withdrawal, workup and management of possible sepsis.            Past Medical History:   Diagnosis Date    Anemia     Anemia     Controlled type 2 diabetes mellitus without complication, without long-term current use of insulin 11/30/2021    COPD (chronic obstructive pulmonary disease)     Depression     Diverticulitis     Fatty liver     GERD (gastroesophageal reflux disease)     Hyperlipidemia     " Hypertension     Pancreatitis     Peptic ulcer disease     Polysubstance abuse     Posterior reversible encephalopathy syndrome     Sarcoidosis of lung     Sarcoidosis of lung     over 30 yrs ago    Seizures     7/2017    Suicide attempt     Suicide ideation        Past Surgical History:   Procedure Laterality Date    APPENDECTOMY      BILATERAL MASTECTOMY Bilateral 10/29/2020    Procedure: MASTECTOMY, BILATERAL;  Surgeon: Baylee Kevin MD;  Location: [address];  Service: General;  Laterality: Bilateral;    BREAST REVISION SURGERY Bilateral 2/11/2021    Procedure: BREAST REVISION SURGERY;  Surgeon: Scottie Johnson MD;  Location: [address];  Service: Plastics;  Laterality: Bilateral;    COLONOSCOPY N/A 7/28/2017    Procedure: COLONOSCOPY;  Surgeon: Aaron Alvarado MD;  Location: [facility];  Service: Endoscopy;  Laterality: N/A;    ESOPHAGOGASTRODUODENOSCOPY  10/7/2016, 11/6/2014    2016 - gastritis, duodenitis, 2014 erosive gastritis    ESOPHAGOGASTRODUODENOSCOPY N/A 2/11/2020    Procedure: ESOPHAGOGASTRODUODENOSCOPY (EGD);  Surgeon: Fawn Garrido MD;  Location: [facility];  Service: Endoscopy;  Laterality: N/A;    ESOPHAGOGASTRODUODENOSCOPY N/A 4/19/2021    Procedure: EGD (ESOPHAGOGASTRODUODENOSCOPY);  Surgeon: Paramjit Martino MD;  Location: [facility];  Service: Endoscopy;  Laterality: N/A;    FLEXIBLE SIGMOIDOSCOPY  11/06/2014    colitis    HYSTERECTOMY      IMPLANTATION OF PERMANENT SACRAL NERVE STIMULATOR N/A 7/12/2022    Procedure: INSERTION, NEUROSTIMULATOR, PERMANENT, SACRAL;  Surgeon: Juaquin Edwards MD;  Location: [address];  Service: Urology;  Laterality: N/A;  1hr    INJECTION FOR SENTINEL NODE IDENTIFICATION Right 10/29/2020    Procedure: INJECTION, FOR SENTINEL NODE IDENTIFICATION;  Surgeon: Baylee Kevin MD;  Location: [address];  Service: General;  Laterality: Right;    INJECTION OF JOINT Right 10/10/2019    Procedure: Injection, Joint  RIGHT ILIOPSOAS BURSA/TENDON INJECTION AND RIGHT GLUTEAL TENDON INJECTION WITH STEROID AND LIDOCAINE;  Surgeon: Guillaume Rico MD;  Location: [facility];  Service: Pain Management;  Laterality: Right;  NEEDS CONSENT    INSERTION OF BREAST TISSUE EXPANDER Bilateral 10/29/2020    Procedure: INSERTION, TISSUE EXPANDER, BREAST;  Surgeon: Scottie Johnson MD;  Location: [facility] OR [address] [place] [address];  Service: Plastics;  Laterality: Bilateral;  Right breast: 1082 g  Left breast: 1076 g    LIPOSUCTION Bilateral 2/11/2021    Procedure: LIPOSUCTION;  Surgeon: Scottie Johnson MD;  Location: [facility] OR [address] [place] [address];  Service: Plastics;  Laterality: Bilateral;    mediastenoscopy      REPLACEMENT OF IMPLANT OF BREAST Bilateral 2/11/2021    Procedure: REPLACEMENT, IMPLANT, BREAST;  Surgeon: Scottie Johnson MD;  Location: [address];  Service: Plastics;  Laterality: Bilateral;    SENTINEL LYMPH NODE BIOPSY Right 10/29/2020    Procedure: BIOPSY, LYMPH NODE, SENTINEL;  Surgeon: Baylee Kevin MD;  Location: [address];  Service: General;  Laterality: Right;    TONSILLECTOMY N/A 1970    TUBAL LIGATION         Review of patient's allergies indicates:   Allergen Reactions    Lortab [hydrocodone-acetaminophen] Itching    Promethazine Itching and Other (See Comments)       Current Facility-Administered Medications on File Prior to Encounter   Medication    albuterol sulfate nebulizer solution 2.5 mg     Current Outpatient Medications on File Prior to Encounter   Medication Sig    anastrozole (ARIMIDEX) 1 mg Tab Take 1 tablet (1 mg total) by mouth every morning.    ARIPiprazole (ABILIFY) 5 MG Tab Take 5 mg by mouth once daily.    atorvastatin (LIPITOR) 10 MG tablet Take 10 mg by mouth every morning.    ATROVENT HFA 17 mcg/actuation inhaler Inhale 2 puffs into the lungs every 6 (six) hours as needed for Wheezing.    chlorzoxazone (PARAFON FORTE) 500 mg Tab Take 500 mg by mouth 2 (two) times daily as needed.     DULoxetine (CYMBALTA) 60 MG capsule Take 60 mg by mouth every evening.    gabapentin (NEURONTIN) 600 MG tablet Take 1 tablet (600 mg total) by mouth 2 (two) times daily.    hydrOXYzine pamoate (VISTARIL) 25 MG Cap Take 25 mg by mouth 3 (three) times daily.    levothyroxine (SYNTHROID) 50 MCG tablet Take 1 tablet (50 mcg total) by mouth before breakfast.    lisinopriL (PRINIVIL,ZESTRIL) 20 MG tablet Take 1 tablet (20 mg total) by mouth once daily. (Patient taking differently: Take 20 mg by mouth every morning.)    metFORMIN (GLUCOPHAGE-XR) 500 MG ER 24hr tablet Take 2 tablets (1,000 mg total) by mouth 2 (two) times daily with meals.    omega-3 fatty acids 1,000 mg Cap Take 1 capsule by mouth once daily.    ondansetron (ZOFRAN-ODT) 4 MG TbDL Take 2 tablets (8 mg total) by mouth every 12 (twelve) hours as needed (Nausea).    pantoprazole (PROTONIX) 40 MG tablet Take 1 tablet (40 mg total) by mouth once daily.    propranoloL (INDERAL) 10 MG tablet Take 10 mg by mouth 2 (two) times daily.    semaglutide (OZEMPIC) 0.25 mg or 0.5 mg(2 mg/1.5 mL) pen injector Inject 0.5 mg into the skin every 7 days. Start with 0.25 mg weekly for 4 weeks and then increase to 0.5 mg if you are tolerating this dose (Patient taking differently: Inject 0.5 mg into the skin every 7 days. Start with 0.25 mg weekly for 4 weeks and then increase to 0.5 mg if you are tolerating this dose  SATURDAYS)    tiotropium-olodateroL (STIOLTO RESPIMAT) 2.5-2.5 mcg/actuation Mist Inhale 1 puff into the lungs 2 (two) times a day. Controller    topiramate (TOPAMAX) 25 MG tablet Take by mouth.    traMADoL (ULTRAM) 50 mg tablet Take 1 tablet (50 mg total) by mouth every 6 (six) hours.    traZODone (DESYREL) 300 MG tablet Take 1 tablet (300 mg total) by mouth nightly.     Family History       Problem Relation (Age of Onset)    Breast cancer Maternal Aunt, Daughter    Colon cancer Maternal Uncle    Diabetes Father, Mother    Heart attack Father     Hypertension Father, Mother          Tobacco Use    Smoking status: Every Day     Packs/day: 0.50     Years: 30.00     Pack years: 15.00     Types: Vaping with nicotine, Cigarettes     Last attempt to quit: 2021     Years since quittin.6    Smokeless tobacco: Never   Substance and Sexual Activity    Alcohol use: Yes     Comment: vodka daily (half a regular bottle)    Drug use: Yes     Types: Marijuana     Comment: gummies    Sexual activity: Yes     Birth control/protection: Surgical     Review of Systems   Constitutional:  Positive for chills and fever.   HENT:  Negative for congestion and facial swelling.    Eyes:  Negative for visual disturbance.   Respiratory:  Positive for cough and shortness of breath.    Cardiovascular:  Negative for chest pain and leg swelling.   Gastrointestinal:  Positive for nausea and vomiting. Negative for diarrhea.   Genitourinary:  Negative for difficulty urinating and dysuria.   Musculoskeletal:  Negative for arthralgias.   Skin:  Negative for rash.   Neurological:  Positive for headaches.   Psychiatric/Behavioral:  Positive for agitation.    Objective:     Vital Signs (Most Recent):  Temp: 98.8 °F (37.1 °C) (22)  Pulse: (!) 124 (22)  Resp: 18 (22)  BP: (!) 164/74 (22)  SpO2: 95 % (22)   Vital Signs (24h Range):  Temp:  [98.8 °F (37.1 °C)-101 °F (38.3 °C)] 98.8 °F (37.1 °C)  Pulse:  [122-133] 124  Resp:  [18] 18  SpO2:  [91 %-97 %] 95 %  BP: (108-164)/(64-80) 164/74     Weight: 86.2 kg (190 lb)  Body mass index is 30.67 kg/m².    Physical Exam  Constitutional:       General: She is in acute distress.   HENT:      Head: Normocephalic and atraumatic.      Mouth/Throat:      Mouth: Mucous membranes are moist.      Pharynx: Oropharynx is clear.   Eyes:      General: No scleral icterus.     Extraocular Movements: Extraocular movements intact.      Conjunctiva/sclera: Conjunctivae normal.   Cardiovascular:      Rate and  Rhythm: Regular rhythm. Tachycardia present.      Heart sounds: Normal heart sounds.   Pulmonary:      Effort: Pulmonary effort is normal.      Breath sounds: Normal breath sounds. No wheezing or rales.      Comments: 3L NC  Abdominal:      General: Abdomen is flat. Bowel sounds are normal.      Palpations: Abdomen is soft.   Musculoskeletal:         General: Normal range of motion.      Cervical back: Normal range of motion.      Right lower leg: No edema.      Left lower leg: No edema.   Skin:     General: Skin is warm and dry.   Neurological:      General: No focal deficit present.      Mental Status: She is alert.      Comments: Agitated, tremulous   Psychiatric:         Mood and Affect: Mood normal.           Significant Labs: All pertinent labs within the past 24 hours have been reviewed.    Significant Imaging: I have reviewed all pertinent imaging results/findings within the past 24 hours.    Assessment/Plan:     * Alcohol withdrawal  62 y.o. F with history of alcohol abuse presents with symptoms of withdrawal (tremulous, agitated) after 3 drinks 9/26 morning. Patient has hx previous hospitalizations for withdrawal. [facility] 21 in ED, given 2mg lorazepam IV.     -valium taper - started on 10mg q6h, wean as tolerated. Patient with serum ethanol 165 and showing signs of withdrawal  -[facility] protocol  -lorazepam 2mg IV PRN for seizures  -seizure precautions  -aspiration precautions      Severe sepsis  This patient does not have evidence of infective focus  My overall impression is sepsis. Vital signs were reviewed and noted in progress note.  Antibiotics given-   Antibiotics (From admission, onward)    Start     Stop Route Frequency Ordered    09/28/22 0100  vancomycin 1.25 g in dextrose 5% 250 mL IVPB (ready to mix)         -- IV Every 24 hours (non-standard times) 09/27/22 0238    09/27/22 0400  cefepime in dextrose 5 % IVPB 2 g         -- IV Every 8 hours (non-standard times) 09/27/22 0223    09/27/22 0321   vancomycin - pharmacy to dose  (vancomycin IVPB)        See [facility] for full Linked Orders Report.    -- IV pharmacy to manage frequency 09/27/22 0223    09/26/22 2215  vancomycin 2 g in dextrose 5 % 500 mL IVPB         09/27 1014 IV ED 1 Time 09/26/22 2213        Cultures were taken-   Microbiology Results (last 7 days)     Procedure Component Value Units Date/Time    Blood culture x two cultures. Draw prior to antibiotics. [[id number]] Collected: 09/26/22 2145    Order Status: Sent Specimen: Blood from Peripheral, Hand, Left Updated: 09/26/22 2154    Blood culture x two cultures. Draw prior to antibiotics. [[id number]] Collected: 09/26/22 2146    Order Status: Sent Specimen: Blood from Peripheral, Forearm, Right Updated: 09/26/22 2154        Latest lactate reviewed, they are-  No results for input(s): LACTATE in the last 72 hours.    Source- undetermined     Source control Achieved by- vanc/cefepime    Patient with T101, tachycardia to 130s, and WBC 27 in ED. Given 1x vanc/zosyn in ED for broad spectrum coverage, 1L LR fluid resuscitation. CXR with no acute abnormality, UA pending. Patient reports flu-like symptoms with increased cough/SOB, muscle aches, and chills.  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 WBC decreased and patient meeting criteria for MBCL per heme/onc clinic note 08/2022. Not on treatment. Patient with WBC 27 in ED 9/26, ddx includes sepsis vs CLL.       Alcohol use disorder, severe, dependence  Patient in alcohol withdrawal, long-standing history of alcohol dependence, previous hospitalizations for withdrawal    -high-dose thiamine to prevent wernicke encephalopathy  -management of withdrawal      COPD (chronic obstructive pulmonary disease)  On 3L NC home O2      Chronic  hypoxemic respiratory failure  Patient with Hypoxic Respiratory failure which is Chronic.  she is on home oxygen at 3 LPM. Supplemental oxygen was provided and noted-  .   Signs/symptoms of respiratory failure include- increased work of breathing. Contributing diagnoses includes - COPD Labs and images were reviewed. Patient Has not had a recent ABG. Will treat underlying causes and adjust management of respiratory failure as follows-     -continue 3L NC    Type 2 diabetes mellitus with hyperglycemia  Patient's FSGs are controlled on current medication regimen.  Last A1c reviewed-   Lab Results   Component Value Date    HGBA1C 6.6 (H) 03/10/2022     Most recent fingerstick glucose reviewed-   Recent Labs   Lab 09/27/22  0217   POCTGLUCOSE 97     Current correctional scale  Low  May adjust anti-hyperglycemic dose as follows-   Antihyperglycemics (From admission, onward)    Start     Stop Route Frequency Ordered    09/27/22 0248  insulin aspart U-100 pen 0-5 Units         -- SubQ Before meals & nightly PRN 09/27/22 0149        Hold Oral hypoglycemics while patient is in the hospital.    Essential hypertension  Holding home BP meds due to possible sepsis      Depression with anxiety  Continue home duloxetine and aripiprazole      Hypothyroidism  Continue home synthroid        VTE Risk Mitigation (From admission, onward)         Ordered     enoxaparin injection 40 mg  Daily         09/27/22 0149     IP VTE HIGH RISK PATIENT  Once         09/27/22 0149     Place sequential compression device  Until discontinued         09/27/22 0149                   MERISSA JACKSON MD  Department of Hospital Medicine   [facility] - Emergency Dept   normal...

## 2025-01-09 NOTE — PLAN OF CARE
Patient states they are ready to be discharged. Instructions given to patient and family. Both verbalize understanding. Patient tolerating po liquids with no difficulty. Patient denies pain. Anesthesia consent and surgical consent in chart upon patient's discharge from [facility].

## 2025-01-09 NOTE — TRANSFER OF CARE
"Anesthesia Transfer of Care Note    Patient: Earl Abdul    Procedure(s) Performed: Procedure(s) (LRB):  EGD (ESOPHAGOGASTRODUODENOSCOPY) (N/A)  COLONOSCOPY, SCREENING, HIGH RISK PATIENT (N/A)    Patient location: [facility]    Anesthesia Type: general    Transport from OR: Transported from OR on room air with adequate spontaneous ventilation    Post pain: adequate analgesia    Post assessment: no apparent anesthetic complications and tolerated procedure well    Post vital signs: stable    Level of consciousness: awake, alert and oriented    Nausea/Vomiting: no nausea/vomiting    Complications: none    Transfer of care protocol was followed    Last vitals: Visit Vitals  BP (!) 164/78   Pulse 62   Temp 36.7 °C (98.1 °F) (Temporal)   Resp 16   Ht 5' 6" (1.676 m)   Wt 86.2 kg (190 lb)   SpO2 (!) 92%   Breastfeeding No   BMI 30.67 kg/m²     "

## 2025-01-09 NOTE — PROVATION PATIENT INSTRUCTIONS
Discharge Summary/Instructions after an Endoscopic Procedure  Patient Name: Earl Abdul  Patient MRN: [id number]  Patient YOB: 1960 Thursday, January 9, 2025  Aayush Valente MD  Dear patient,  As a result of recent federal legislation (The Federal Cures Act), you may   receive lab or pathology results from your procedure in your [facility]   account before your physician is able to contact you. Your physician or   their representative will relay the results to you with their   recommendations at their soonest availability.  Thank you,  RESTRICTIONS:  During your procedure today, you received medications for sedation.  These   medications may affect your judgment, balance and coordination.  Therefore,   for 24 hours, you have the following restrictions:   - DO NOT drive a car, operate machinery, make legal/financial decisions,   sign important papers or drink alcohol.    ACTIVITY:  Today: no heavy lifting, straining or running due to procedural   sedation/anesthesia.  The following day: return to full activity including work.  DIET:  Eat and drink normally unless instructed otherwise.     TREATMENT FOR COMMON SIDE EFFECTS:  - Mild abdominal pain, nausea, belching, bloating or excessive gas:  rest,   eat lightly and use a heating pad.  - Sore Throat: treat with throat lozenges and/or gargle with warm salt   water.  - Because air was used during the procedure, expelling large amounts of air   from your rectum or belching is normal.  - If a bowel prep was taken, you may not have a bowel movement for 1-3 days.    This is normal.  SYMPTOMS TO WATCH FOR AND REPORT TO YOUR PHYSICIAN:  1. Abdominal pain or bloating, other than gas cramps.  2. Chest pain.  3. Back pain.  4. Signs of infection such as: chills or fever occurring within 24 hours   after the procedure.  5. Rectal bleeding, which would show as bright red, maroon, or black stools.   (A tablespoon of blood from the rectum is not serious, especially  if   hemorrhoids are present.)  6. Vomiting.  7. Weakness or dizziness.  GO DIRECTLY TO THE NEAREST EMERGENCY ROOM IF YOU HAVE ANY OF THE FOLLOWING:      Difficulty breathing              Chills and/or fever over 101 F   Persistent vomiting and/or vomiting blood   Severe abdominal pain   Severe chest pain   Black, tarry stools   Bleeding- more than one tablespoon   Any other symptom or condition that you feel may need urgent attention  Your doctor recommends these additional instructions:  If any biopsies were taken, your doctors clinic will contact you in 1 to 2   weeks with any results.  - Perform a colonoscopy to follow.   - Await pathology results.   - See colonoscopy report for further recommendations.  For questions, problems or results please call your physician - Aayush Valente MD at Work:  [phone].  [facility] [place] [facility], EMERGENCY ROOM PHONE NUMBER: [phone]  IF A COMPLICATION OR EMERGENCY SITUATION ARISES AND YOU ARE UNABLE TO REACH   YOUR PHYSICIAN - GO DIRECTLY TO THE EMERGENCY ROOM.  Aayush Valente MD  1/9/2025 2:33:01 PM  This report has been verified and signed electronically.  Dear patient,  As a result of recent federal legislation (The Federal Cures Act), you may   receive lab or pathology results from your procedure in your [facility]   account before your physician is able to contact you. Your physician or   their representative will relay the results to you with their   recommendations at their soonest availability.  Thank you,  PROVATION

## 2025-01-09 NOTE — ANESTHESIA PREPROCEDURE EVALUATION
[facility]  Anesthesia Pre-Operative Evaluation     Patient Name: Earl Abdul  YOB: 1960  MRN: [id number]  [facility]: [id number]       Admit Date: 1/9/2025   Admit Team: Networked reference to record PCT   Hospital Day: 1  Date of Procedure: 1/9/2025  Anesthesia: Choice Procedure: Procedure(s) (LRB):  EGD (ESOPHAGOGASTRODUODENOSCOPY) (N/A)  COLONOSCOPY, SCREENING, HIGH RISK PATIENT (N/A)  Pre-Operative Diagnosis: Diarrhea, unspecified type [R19.7]  Hx of colonic polyps [Z86.0100]  Proceduralist:Surgeons and Role:     * Aayush Valente MD - Primary  Code Status: Prior   Advanced Directive: <no information>  Isolation Precautions: No active isolations  Capacity: Full capacity     SUBJECTIVE:   Earl Abdul is a 64 y.o. female who  has a past medical history of Alcoholism, Anemia, Aortic atherosclerosis (04/17/2024), C. difficile colitis, Cancer of breast (10/2020), CLL (chronic lymphocytic leukemia) (09/30/2022), Controlled type 2 diabetes mellitus without complication, without long-term current use of insulin (11/30/2021), COPD (chronic obstructive pulmonary disease), Depression, Diverticulitis, Fatty liver, GERD (gastroesophageal reflux disease), Hyperlipidemia, Hypertension, Pancreatitis, Peptic ulcer disease, Polysubstance abuse, Posterior reversible encephalopathy syndrome, Sarcoidosis of lung, Seizures, and Suicide attempt.  No notes on file   0.9% NaCl   Intravenous Continuous         Hospital LOS: 0 days  ICU LOS: Patient does not have an ICU stay during this admission.    she has a current medication list which includes the following long-term medication(s): amitriptyline, cholestyramine, clonazepam, donepezil, gabapentin, levothyroxine, metformin, fish oil-omega-3 fatty acids, trazodone, diclofenac sodium, folic acid, and [DISCONTINUED] omeprazole.   Current Outpatient Medications   Medication Instructions    amitriptyline (ELAVIL) 25-50 mg, Nightly    apixaban (ELIQUIS)  5 mg, Oral, 2 times daily    cholestyramine (QUESTRAN) 4 gram packet 4 g, Oral, Nightly, Avoid taking other medications one hour before or 4 hours after taking this medication. Can interfere with absorption of other medications.    clonazePAM (KLONOPIN) 0.25 mg, Nightly    cyanocobalamin (VITAMIN B-12) 100 mcg, Daily    diclofenac sodium (VOLTAREN) 2 g, Topical (Top), 4 times daily    dicyclomine (BENTYL) 10 mg, Oral, 3 times daily PRN    donepeziL (ARICEPT) 5 MG tablet 1 tablet, Nightly    EScitalopram oxalate (LEXAPRO) 10 mg, Daily    fluticasone furoate (ARNUITY ELLIPTA) 100 mcg/actuation inhaler 1 puff, Inhalation, Daily, Rinse mouth after each use.    folic acid (FOLVITE) 1 mg, Oral, Daily    gabapentin (NEURONTIN) 300 mg, Oral, 3 times daily    hydrOXYzine pamoate (VISTARIL) 50 mg, Daily PRN    lancets Misc Use to check blood glucose 4 times daily    levothyroxine (SYNTHROID) 75 mcg, Oral, Before breakfast    magnesium oxide (MAG-OX) 400 mg, Oral, Every morning    metFORMIN (GLUCOPHAGE-XR) 500 mg, Oral, Daily    multivitamin Tab 1 tablet, Oral, Daily    naltrexone (DEPADE) 50 mg, Daily    omega-3 fatty acids/fish oil (FISH OIL-OMEGA-3 FATTY ACIDS) 300-1,000 mg capsule 2 capsules, Daily    omeprazole (PRILOSEC) 20 mg, Oral, Daily    ondansetron (ZOFRAN) 8 mg, Oral, Every 8 hours PRN    propranoloL (INDERAL) 20 mg, 2 times daily    QUEtiapine (SEROQUEL) 50 mg, Nightly    QULIPTA 60 mg, Daily    scopolamine (TRANSDERM-SCOP) 1.3-1.5 mg (1 mg over 3 days) 1 patch, Transdermal, Every 72 hours    tiotropium bromide (SPIRIVA RESPIMAT) 2.5 mcg/actuation inhaler 2 puffs, Inhalation, Daily, Controller    traZODone (DESYREL) 200 mg, Oral, Nightly    TRINTELLIX 5 mg, Every morning    TURMERIC ORAL 250 mg, Every morning     ALLERGIES:     Review of patient's allergies indicates:   Allergen Reactions    Lortab [hydrocodone-acetaminophen] Itching    Promethazine Itching and Other (See Comments)    Albuterol      Other  Reaction(s): CONFUSION     LDA:   AIRWAY:         [unfilled]     Lines/Drains/Airways       None                  Anesthesia Evaluation      Airway   Mallampati: II  TM distance: Normal  Neck ROM: Normal ROM  Dental    (+) Intact    Pulmonary    (+) COPD, shortness of breath, sleep apnea  Cardiovascular   Exercise tolerance: good  (+) hypertension well controlled    Neuro/Psych    (+) seizures, neuromuscular disease, headaches, psychiatric history    GI/Hepatic/Renal    (+) GERD, PUD, liver disease, chronic renal disease    Endo/Other    (+) diabetes mellitus, hypothyroidism, hyperthyroidism, arthritis  Abdominal                    MEDICATIONS:     Current Outpatient Medications on File Prior to Encounter   Medication Sig Dispense Refill Last Dose/Taking    atogepant (QULIPTA) 60 mg Tab Take 60 mg by mouth once daily.   Past Month    clonazePAM (KLONOPIN) 0.5 MG tablet Take 0.25 mg by mouth every evening.   1/8/2025    donepeziL (ARICEPT) 5 MG tablet Take 1 tablet by mouth every evening.   1/8/2025    fluticasone furoate (ARNUITY ELLIPTA) 100 mcg/actuation inhaler Inhale 1 puff into the lungs once daily. Rinse mouth after each use. 30 each 11 1/9/2025 at 10:00 AM    gabapentin (NEURONTIN) 300 MG capsule Take 1 capsule (300 mg total) by mouth 3 (three) times daily. (Patient taking differently: Take 400 mg by mouth 3 (three) times daily.) 90 capsule 0 1/9/2025 at 10:00 AM    levothyroxine (SYNTHROID) 75 MCG tablet Take 1 tablet (75 mcg total) by mouth before breakfast. 90 tablet 1 1/8/2025    naltrexone (DEPADE) 50 mg tablet Take 50 mg by mouth once daily.   1/8/2025    omega-3 fatty acids/fish oil (FISH OIL-OMEGA-3 FATTY ACIDS) 300-1,000 mg capsule Take 2 capsules by mouth once daily. Take mornings and nights.   1/8/2025    omeprazole (PRILOSEC) 20 MG capsule Take 1 capsule (20 mg total) by mouth once daily. 30 capsule 0 1/8/2025    propranoloL (INDERAL) 20 MG tablet Take 20 mg by mouth 2 (two) times daily.    1/8/2025    QUEtiapine (SEROQUEL) 50 MG tablet Take 50 mg by mouth every evening.   Past Month    traZODone (DESYREL) 100 MG tablet Take 2 tablets (200 mg total) by mouth every evening. 60 tablet 0 1/8/2025    TURMERIC ORAL Take 250 mg by mouth every morning. TurmericXL   1/8/2025    vortioxetine (TRINTELLIX) 5 mg Tab Take 5 mg by mouth every morning.   1/8/2025    cyanocobalamin (VITAMIN B-12) 1000 MCG tablet Take 100 mcg by mouth once daily. Once every morning.       EScitalopram oxalate (LEXAPRO) 10 MG tablet Take 10 mg by mouth once daily. (Patient not taking: Reported on 12/26/2024)       folic acid (FOLVITE) 1 MG tablet Take 1 tablet (1 mg total) by mouth once daily. 30 tablet 0     lancets Misc Use to check blood glucose 4 times daily 100 each 5     multivitamin Tab Take 1 tablet by mouth once daily. 30 tablet 0     [DISCONTINUED] omeprazole (PRILOSEC OTC) 20 MG tablet Take 20 mg by mouth once daily.         Inpatient Medications:  Antibiotics (From admission, onward)      None          VTE Risk Mitigation (From admission, onward)      None              Current Facility-Administered Medications   Medication Dose Route Frequency Provider Last Rate Last Admin    0.9% NaCl infusion   Intravenous Continuous Aayush Valente MD         Facility-Administered Medications Ordered in Other Encounters   Medication Dose Route Frequency Provider Last Rate Last Admin    albuterol sulfate nebulizer solution 2.5 mg  2.5 mg Nebulization Once Andrew Rodriguez MD              History:   There are no hospital problems to display for this patient.    Surgical History:    has a past surgical history that includes Hysterectomy; Appendectomy; mediastenoscopy; Tubal ligation; Tonsillectomy (N/A, 1970); Esophagogastroduodenoscopy (10/7/2016, 11/6/2014); Flexible sigmoidoscopy (11/06/2014); Colonoscopy (N/A, 7/28/2017); Injection of joint (Right, 10/10/2019); Esophagogastroduodenoscopy (N/A, 2/11/2020); Bilateral mastectomy  (Bilateral, 10/29/2020); [place] lymph node biopsy (Right, 10/29/2020); Injection for sentinel node identification (Right, 10/29/2020); Insertion of breast tissue expander (Bilateral, 10/29/2020); Breast revision surgery (Bilateral, 2/11/2021); Replacement of implant of breast (Bilateral, 2/11/2021); Liposuction (Bilateral, 2/11/2021); Esophagogastroduodenoscopy (N/A, 4/19/2021); and Implantation of permanent sacral nerve stimulator (N/A, 7/12/2022).   Social History:    reports being sexually active. She reports using the following method of birth control/protection: Surgical.  reports that she has been smoking vaping with nicotine and cigarettes. She started smoking about 33 years ago. She has a 15 pack-year smoking history. She has never used smokeless tobacco. She reports that she does not currently use alcohol. She reports that she does not currently use drugs after having used the following drugs: Marijuana.    There were no vitals filed for this visit.  Vital Signs Range (Last 24H):       There is no height or weight on file to calculate BMI.  Wt Readings from Last 4 Encounters:   12/26/24 92.5 kg (203 lb 14.8 oz)   12/12/24 93.2 kg (205 lb 7.5 oz)   12/11/24 93.7 kg (206 lb 9.1 oz)   12/03/24 93.7 kg (206 lb 9.1 oz)        Intake/Output - Last 3 Shifts       None          Lab Results   Component Value Date    WBC 12.30 12/18/2024    HGB 10.4 (L) 12/18/2024    HCT 35.1 (L) 12/18/2024     (L) 12/18/2024     12/18/2024    K 4.4 12/18/2024     12/18/2024    CREATININE 1.0 12/18/2024    BUN 10 12/18/2024    CO2 22 (L) 12/18/2024     (H) 12/18/2024    CALCIUM 9.1 12/18/2024    MG 1.4 (L) 08/08/2024    PHOS 3.1 08/08/2024    ALKPHOS 60 12/18/2024    ALT 15 12/18/2024    AST 19 12/18/2024    ALBUMIN 3.8 12/18/2024    INR 1.0 03/02/2024    APTT 22.1 (L) 11/20/2022    HGBA1C 5.9 (H) 06/09/2024    LACTATE 0.9 10/22/2024    CPK 75 05/17/2024    TROPONINI 0.016 10/22/2024    BNP 23 10/22/2024  "   HCGQUANT <2.0 05/09/2006     Recent Results (from the past 12 hours)   POCT glucose    Collection Time: 01/09/25  1:05 PM   Result Value Ref Range    POCT Glucose 119 (H) 70 - 110 mg/dL     No results for input(s): "WBC", "HGB", "HCT", "PLT", "NA", "K", "CREATININE", "GLU", "INR", "LACTATE", "5HIAAPLASMA", "5HIAAURINT", "5HIAA", "5GAFG51XF" in the last 168 hours.  No LMP recorded. Patient has had a hysterectomy.    EKG:   Results for orders placed or performed during the hospital encounter of 10/22/24   EKG 12-lead    Collection Time: 10/22/24  7:48 AM   Result Value Ref Range    QRS Duration 84 ms    [facility] QTC Calculation 435 ms    Narrative    Test Reason : R10.9,    Vent. Rate : 101 BPM     Atrial Rate : 101 BPM     P-R Int : 154 ms          QRS Dur : 084 ms      QT Int : 336 ms       P-R-T Axes : 074 076 066 degrees     QTc Int : 435 ms    Sinus tachycardia  Nonspecific ST abnormality  Abnormal ECG  When compared with ECG of 05-AUG-2024 11:49,  Vent. rate has increased BY  36 BPM  ST now depressed in Lateral leads  Confirmed by Sg STEPHENSON MD ([id number]) on 10/22/2024 9:49:20 AM    Referred By: [facility]   SELF           Confirmed By:Sg STEPHENSON MD     TTE:  Results for orders placed during the hospital encounter of 03/01/24    Echo    Interpretation Summary    Left Ventricle: The left ventricle is normal in size. Normal wall thickness. There is concentric remodeling. There is normal systolic function with a visually estimated ejection fraction of 55 - 60%. There is normal diastolic function.    Right Ventricle: Normal right ventricular cavity size. Wall thickness is normal. Right ventricle wall motion  is normal. Systolic function is normal.    Left Atrium: Left atrium is mildly dilated.    Right Atrium: Right atrium is mildly dilated.    IVC/SVC: Normal venous pressure at 3 mmHg.    YOBANI:  No results found for this or any previous visit.    Stress Test:  No results found for this or any previous visit.    No results " found for this or any previous visit.    LHC:  No results found for this or any previous visit.    Cardiac Device Check   No results found for this or any previous visit.    No results found for this or any previous visit.                                                                                                               01/09/2025  Earl Abdul is a 64 y.o., female.      Pre-op Assessment    I have reviewed the Patient Summary Reports.          Review of Systems  Anesthesia Hx:  No problems with previous Anesthesia   History of prior surgery of interest to airway management or planning:  Previous anesthesia: General           Social:  Smoker, No Alcohol Use       Cardiovascular:  Exercise tolerance: good   Hypertension, well controlled                       Shortness of Breath                    Hypertension     Atrial Fibrillation     Pulmonary:   COPD   Shortness of breath  Sleep Apnea    Chronic Obstructive Pulmonary Disease (COPD):           Obstructive Sleep Apnea (VINICIO).           Renal/:  Chronic Renal Disease        Kidney Function/Disease             Hepatic/GI:   PUD,  GERD Liver Disease,        Gerd, Peptic Ulcer Disease       Liver Disease        Musculoskeletal:  Arthritis        Arthritis          Neurological:    Neuromuscular Disease,  Headaches Seizures     Dx of Headaches   Arthritis      Seizure Disorder                        Neuromuscular Disease   Endocrine:  Diabetes Hypothyroidism Hyperthyroidism  Diabetes                Hyperthyroidism   Hypothyroidism          Psych:  Psychiatric History                  Physical Exam  General: Well nourished, Cooperative, Alert and Oriented    Airway:  Mallampati: II   Mouth Opening: Normal  TM Distance: Normal  Tongue: Normal  Neck ROM: Normal ROM    Dental:  Intact        Anesthesia Plan  Type of Anesthesia, risks & benefits discussed:    Anesthesia Type: Gen Natural Airway  Intra-op Monitoring Plan: Standard ASA Monitors  Post Op Pain  Control Plan: multimodal analgesia and IV/PO Opioids PRN  Induction:  IV  Airway Plan: Video, Post-Induction  Informed Consent: Informed consent signed with the Patient and all parties understand the risks and agree with anesthesia plan.  All questions answered.   ASA Score: 3  Anesthesia Plan Notes: Pt unhappy about case delay due to NPO violation.  She states that she only had enough liquid to take her antiemetic pills.  I explained to her our concerns about aspiration and she expressed an understanding.      Ready For Surgery From Anesthesia Perspective.     .

## 2025-01-09 NOTE — [employer] PATIENT INSTRUCTIONS
Discharge Summary/Instructions after an Endoscopic Procedure  Patient Name: Earl Abdul  Patient MRN: [id number]  Patient YOB: 1960 Thursday, January 9, 2025  Aayush Valente MD  Dear patient,  As a result of recent federal legislation (The Federal Cures Act), you may   receive lab or pathology results from your procedure in your [facility]   account before your physician is able to contact you. Your physician or   their representative will relay the results to you with their   recommendations at their soonest availability.  Thank you,  RESTRICTIONS:  During your procedure today, you received medications for sedation.  These   medications may affect your judgment, balance and coordination.  Therefore,   for 24 hours, you have the following restrictions:   - DO NOT drive a car, operate machinery, make legal/financial decisions,   sign important papers or drink alcohol.    ACTIVITY:  Today: no heavy lifting, straining or running due to procedural   sedation/anesthesia.  The following day: return to full activity including work.  DIET:  Eat and drink normally unless instructed otherwise.     TREATMENT FOR COMMON SIDE EFFECTS:  - Mild abdominal pain, nausea, belching, bloating or excessive gas:  rest,   eat lightly and use a heating pad.  - Sore Throat: treat with throat lozenges and/or gargle with warm salt   water.  - Because air was used during the procedure, expelling large amounts of air   from your rectum or belching is normal.  - If a bowel prep was taken, you may not have a bowel movement for 1-3 days.    This is normal.  SYMPTOMS TO WATCH FOR AND REPORT TO YOUR PHYSICIAN:  1. Abdominal pain or bloating, other than gas cramps.  2. Chest pain.  3. Back pain.  4. Signs of infection such as: chills or fever occurring within 24 hours   after the procedure.  5. Rectal bleeding, which would show as bright red, maroon, or black stools.   (A tablespoon of blood from the rectum is not serious, especially  if   hemorrhoids are present.)  6. Vomiting.  7. Weakness or dizziness.  GO DIRECTLY TO THE NEAREST EMERGENCY ROOM IF YOU HAVE ANY OF THE FOLLOWING:      Difficulty breathing              Chills and/or fever over 101 F   Persistent vomiting and/or vomiting blood   Severe abdominal pain   Severe chest pain   Black, tarry stools   Bleeding- more than one tablespoon   Any other symptom or condition that you feel may need urgent attention  Your doctor recommends these additional instructions:  If any biopsies were taken, your doctors clinic will contact you in 1 to 2   weeks with any results.  - Discharge patient to home.   - Resume previous diet.   - Continue present medications.   - Resume Eliquis (apixaban) at prior dose tomorrow.   - Await pathology results.   - Repeat colonoscopy in 10 years for surveillance.   - Return to referring physician.   - Patient has a contact number available for emergencies.  The signs and   symptoms of potential delayed complications were discussed with the   patient.  Return to normal activities tomorrow.  Written discharge   instructions were provided to the patient.  For questions, problems or results please call your physician - Aayush Valente MD at Work:  [phone].  [facility] [place] [facility], EMERGENCY ROOM PHONE NUMBER: [phone]  IF A COMPLICATION OR EMERGENCY SITUATION ARISES AND YOU ARE UNABLE TO REACH   YOUR PHYSICIAN - GO DIRECTLY TO THE EMERGENCY ROOM.  Aayush Valente MD  1/9/2025 2:35:08 PM  This report has been verified and signed electronically.  Dear patient,  As a result of recent federal legislation (The Federal Cures Act), you may   receive lab or pathology results from your procedure in your [facility]   account before your physician is able to contact you. Your physician or   their representative will relay the results to you with their   recommendations at their soonest availability.  Thank you,  [employer]

## 2025-01-09 NOTE — ANESTHESIA POSTPROCEDURE EVALUATION
Anesthesia Post Evaluation    Patient: Earl Abdul    Procedure(s) Performed: Procedure(s) (LRB):  EGD (ESOPHAGOGASTRODUODENOSCOPY) (N/A)  COLONOSCOPY, SCREENING, HIGH RISK PATIENT (N/A)    Final Anesthesia Type: general      Patient location during evaluation: PACU  Patient participation: Yes- Able to Participate  Level of consciousness: awake and alert  Post-procedure vital signs: reviewed and stable  Pain management: adequate  Airway patency: patent    PONV status at discharge: No PONV  Anesthetic complications: no      Cardiovascular status: blood pressure returned to baseline  Respiratory status: unassisted  Hydration status: euvolemic  Follow-up not needed.              Vitals Value Taken Time   /79 01/09/25 1500   Temp 36.7 °C (98.1 °F) 01/09/25 1430   Pulse 62 01/09/25 1504   Resp 29 01/09/25 1500   SpO2 96 % 01/09/25 1504   Vitals shown include unfiled device data.      No case tracking events are documented in the log.      Pain/Mary Score: Mary Score: 10 (1/9/2025  2:45 PM)

## 2025-01-09 NOTE — H&P
Endoscopy History and Physical    PCP - Andrew Rodriguez MD    Procedure - EGD and colonoscopy  ASA - per anesthesia  Mallampati - per anesthesia  Plan of anesthesia - MAC    HPI:  This is a 64 y.o. female here for evaluation of : diarrhea    ROS:  Constitutional: No fevers, chills  CV: No chest pain  Pulm: No cough  Ophtho: No vision changes  GI: see HPI  Derm: No rash    Medical History:  has a past medical history of Alcoholism, Anemia, Aortic atherosclerosis (04/17/2024), C. difficile colitis, Cancer of breast (10/2020), CLL (chronic lymphocytic leukemia) (09/30/2022), Controlled type 2 diabetes mellitus without complication, without long-term current use of insulin (11/30/2021), COPD (chronic obstructive pulmonary disease), Depression, Diverticulitis, Fatty liver, GERD (gastroesophageal reflux disease), Hyperlipidemia, Hypertension, Pancreatitis, Peptic ulcer disease, Polysubstance abuse, Posterior reversible encephalopathy syndrome, Sarcoidosis of lung, and Suicide attempt.    Surgical History:  has a past surgical history that includes Hysterectomy; Appendectomy; mediastenoscopy; Tubal ligation; Tonsillectomy (N/A, 1970); Esophagogastroduodenoscopy (10/7/2016, 11/6/2014); Flexible sigmoidoscopy (11/06/2014); Colonoscopy (N/A, 7/28/2017); Injection of joint (Right, 10/10/2019); Esophagogastroduodenoscopy (N/A, 2/11/2020); Bilateral mastectomy (Bilateral, 10/29/2020); [place] lymph node biopsy (Right, 10/29/2020); Injection for sentinel node identification (Right, 10/29/2020); Insertion of breast tissue expander (Bilateral, 10/29/2020); Breast revision surgery (Bilateral, 2/11/2021); Replacement of implant of breast (Bilateral, 2/11/2021); Liposuction (Bilateral, 2/11/2021); Esophagogastroduodenoscopy (N/A, 4/19/2021); and Implantation of permanent sacral nerve stimulator (N/A, 7/12/2022).    Family History: family history includes Breast cancer in her daughter and maternal aunt; Colon cancer in her  maternal uncle; Diabetes in her father and mother; Heart attack in her father; Hypertension in her father and mother.. Otherwise no colon cancer, inflammatory bowel disease, or GI malignancies.    Social History:  reports that she has been smoking vaping with nicotine and cigarettes. She started smoking about 33 years ago. She has a 15 pack-year smoking history. She has never used smokeless tobacco. She reports current alcohol use. She reports current drug use. Drug: Marijuana.    Review of patient's allergies indicates:   Allergen Reactions    Lortab [hydrocodone-acetaminophen] Itching    Promethazine Itching and Other (See Comments)    Albuterol      Other Reaction(s): CONFUSION       Medications:   Medications Prior to Admission   Medication Sig Dispense Refill Last Dose/Taking    clonazePAM (KLONOPIN) 0.5 MG tablet Take 0.25 mg by mouth every evening.   1/8/2025    dicyclomine (BENTYL) 10 MG capsule Take 1 capsule (10 mg total) by mouth 3 (three) times daily as needed (abdominal cramping). 120 capsule 0 1/8/2025    gabapentin (NEURONTIN) 300 MG capsule Take 1 capsule (300 mg total) by mouth 3 (three) times daily. (Patient taking differently: Take 400 mg by mouth 3 (three) times daily.) 90 capsule 0 1/9/2025 at 10:00 AM    ondansetron (ZOFRAN) 8 MG tablet Take 1 tablet (8 mg total) by mouth every 8 (eight) hours as needed for Nausea. 30 tablet 0 1/8/2025    scopolamine (TRANSDERM-SCOP) 1.3-1.5 mg (1 mg over 3 days) Place 1 patch onto the skin every 72 hours. 7 patch 0 1/8/2025    tiotropium bromide (SPIRIVA RESPIMAT) 2.5 mcg/actuation inhaler Inhale 2 puffs into the lungs Daily. Controller 4 g 11 1/9/2025 at 10:00 AM    amitriptyline (ELAVIL) 25 MG tablet Take 25-50 mg by mouth every evening.       apixaban (ELIQUIS) 5 mg Tab Take 1 tablet (5 mg total) by mouth 2 (two) times daily. 180 tablet 0     atogepant (QULIPTA) 60 mg Tab Take 60 mg by mouth once daily.       cholestyramine (QUESTRAN) 4 gram packet Take 1  packet (4 g total) by mouth every evening. Avoid taking other medications one hour before or 4 hours after taking this medication. Can interfere with absorption of other medications. 30 packet 0     cyanocobalamin (VITAMIN B-12) 1000 MCG tablet Take 100 mcg by mouth once daily. Once every morning.       diclofenac sodium (VOLTAREN) 1 % Gel Apply 2 g topically 4 (four) times daily. 100 g 11     donepeziL (ARICEPT) 5 MG tablet Take 1 tablet by mouth every evening.       EScitalopram oxalate (LEXAPRO) 10 MG tablet Take 10 mg by mouth once daily. (Patient not taking: Reported on 12/26/2024)       fluticasone furoate (ARNUITY ELLIPTA) 100 mcg/actuation inhaler Inhale 1 puff into the lungs once daily. Rinse mouth after each use. 30 each 11     folic acid (FOLVITE) 1 MG tablet Take 1 tablet (1 mg total) by mouth once daily. 30 tablet 0     hydrOXYzine pamoate (VISTARIL) 50 MG Cap Take 50 mg by mouth daily as needed.   Unknown    lancets Misc Use to check blood glucose 4 times daily 100 each 5     levothyroxine (SYNTHROID) 75 MCG tablet Take 1 tablet (75 mcg total) by mouth before breakfast. 90 tablet 1     magnesium oxide (MAG-OX) 400 mg (241.3 mg magnesium) tablet TAKE 1 TABLET BY MOUTH EVERY MORNING 90 tablet 0     metFORMIN (GLUCOPHAGE-XR) 500 MG ER 24hr tablet Take 1 tablet (500 mg total) by mouth once daily. 360 tablet 3 Unknown    multivitamin Tab Take 1 tablet by mouth once daily. 30 tablet 0     naltrexone (DEPADE) 50 mg tablet Take 50 mg by mouth once daily.       omega-3 fatty acids/fish oil (FISH OIL-OMEGA-3 FATTY ACIDS) 300-1,000 mg capsule Take 2 capsules by mouth once daily. Take mornings and nights.       omeprazole (PRILOSEC) 20 MG capsule Take 1 capsule (20 mg total) by mouth once daily. 30 capsule 0     propranoloL (INDERAL) 20 MG tablet Take 20 mg by mouth 2 (two) times daily.       QUEtiapine (SEROQUEL) 50 MG tablet Take 50 mg by mouth every evening.       traZODone (DESYREL) 100 MG tablet Take 2  tablets (200 mg total) by mouth every evening. 60 tablet 0     TURMERIC ORAL Take 250 mg by mouth every morning. TurmericXL       vortioxetine (TRINTELLIX) 5 mg Tab Take 5 mg by mouth every morning.            Vital Signs: There were no vitals filed for this visit.    General Appearance: Well appearing in no acute distress  Eyes:    No scleral icterus  ENT: atraumatic  Abdomen: Soft, nondistended  Extremities: no tenderness  Skin: normal color    Labs:  Lab Results   Component Value Date    WBC 12.30 12/18/2024    HGB 10.4 (L) 12/18/2024    HCT 35.1 (L) 12/18/2024     (L) 12/18/2024    CHOL 184 04/06/2023    TRIG 161 (H) 04/06/2023    HDL 44 04/06/2023    ALT 15 12/18/2024    AST 19 12/18/2024     12/18/2024    K 4.4 12/18/2024     12/18/2024    CREATININE 1.0 12/18/2024    BUN 10 12/18/2024    CO2 22 (L) 12/18/2024    TSH 1.637 06/07/2024    INR 1.0 03/02/2024    HGBA1C 5.9 (H) 06/09/2024       I have explained the risks and benefits of endoscopy procedures to the patient/their POA including but not limited to bleeding, perforation, infection, and death.  The patient/POA was asked if they understand and allowed to ask any further questions to their satisfaction.    Proceed with EGD and colonoscopy    Alan Perales MD

## 2025-01-10 ENCOUNTER — TELEPHONE (OUTPATIENT)
Dept: GASTROENTEROLOGY | Facility: HOSPITAL | Age: 65
End: 2025-01-10
Payer: COMMERCIAL

## 2025-01-10 ENCOUNTER — PATIENT MESSAGE (OUTPATIENT)
Dept: GASTROENTEROLOGY | Facility: CLINIC | Age: 65
End: 2025-01-10
Payer: COMMERCIAL

## 2025-01-10 ENCOUNTER — TELEPHONE (OUTPATIENT)
Dept: GASTROENTEROLOGY | Facility: CLINIC | Age: 65
End: 2025-01-10
Payer: COMMERCIAL

## 2025-01-10 RX ORDER — SCOLOPAMINE TRANSDERMAL SYSTEM 1 MG/1
1 PATCH, EXTENDED RELEASE TRANSDERMAL
Qty: 7 PATCH | Refills: 0 | Status: SHIPPED | OUTPATIENT
Start: 2025-01-10

## 2025-01-10 RX ORDER — ONDANSETRON HYDROCHLORIDE 8 MG/1
8 TABLET, FILM COATED ORAL EVERY 8 HOURS PRN
Qty: 30 TABLET | Refills: 0 | Status: SHIPPED | OUTPATIENT
Start: 2025-01-10

## 2025-01-10 NOTE — PROGRESS NOTES
Call patient because I was notified that she was having ongoing symptoms. Complains of nausea and vomiting in spite of being on Zofran and scopolamine patch. will arrange close clinic follow up since I believe this is a disorder of gut brain interaction and she may benefit from tricyclic antidepressant. in the meantime, will prescribe some Phenergan.

## 2025-01-10 NOTE — TELEPHONE ENCOUNTER
----- Message from Eva sent at 1/10/2025  3:40 PM CST -----  Regarding: Post Op call needed  Contact: [phone]  Type:  Needs Medical Advice  Who Called: Pt's    Symptoms (please be specific): Nauseas, unable to eat, feeling bad and would like to spk with the office.    How long has patient had these symptoms:  1day   Pharmacy name and phone #:  N/A  Would the patient rather a call back or a response via [facility]? Call back    Call Back Number:[phone]      Thank you.

## 2025-01-10 NOTE — TELEPHONE ENCOUNTER
Spoke with patient.Says she is having stomach pain and diarrhea. Nausea also. Nausea med not helping.  Wants rto know if you can call something in for her.

## 2025-01-10 NOTE — TELEPHONE ENCOUNTER
Discussed with patient and Dr. Johnson. She unfortunately has listed allergy to phenergan. Cannot use compazine either due to cross reactivity with phenergan. She does need refills on both scopalamine and zofran so will provide those. Reiterated ED precautions for PO intolerance, severe abdominal pain, fevers/chills. She understands.

## 2025-01-13 ENCOUNTER — OFFICE VISIT (OUTPATIENT)
Dept: PAIN MEDICINE | Facility: OTHER | Age: 65
End: 2025-01-13
Attending: ANESTHESIOLOGY
Payer: COMMERCIAL

## 2025-01-13 DIAGNOSIS — R26.89 DECREASED MOBILITY: ICD-10-CM

## 2025-01-13 DIAGNOSIS — M79.7 FIBROMYALGIA: Primary | ICD-10-CM

## 2025-01-13 DIAGNOSIS — F03.90 DEMENTIA, UNSPECIFIED DEMENTIA SEVERITY, UNSPECIFIED DEMENTIA TYPE, UNSPECIFIED WHETHER BEHAVIORAL, PSYCHOTIC, OR MOOD DISTURBANCE OR ANXIETY: ICD-10-CM

## 2025-01-13 DIAGNOSIS — R26.89 BALANCE PROBLEM: ICD-10-CM

## 2025-01-13 DIAGNOSIS — T74.92XA CHILDHOOD ABUSE: ICD-10-CM

## 2025-01-13 PROCEDURE — 99215 OFFICE O/P EST HI 40 MIN: CPT | Mod: ,,, | Performed by: NURSE PRACTITIONER

## 2025-01-13 PROCEDURE — 1160F RVW MEDS BY RX/DR IN RCRD: CPT | Mod: CPTII,,, | Performed by: NURSE PRACTITIONER

## 2025-01-13 PROCEDURE — 1159F MED LIST DOCD IN RCRD: CPT | Mod: CPTII,,, | Performed by: NURSE PRACTITIONER
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
## 2025-01-14 ENCOUNTER — HOSPITAL ENCOUNTER (EMERGENCY)
Facility: HOSPITAL | Age: 65
Discharge: HOME | End: 2025-01-14
Attending: EMERGENCY MEDICINE
Payer: COMMERCIAL

## 2025-01-14 VITALS
TEMPERATURE: 98 F | RESPIRATION RATE: 16 BRPM | BODY MASS INDEX: 30.37 KG/M2 | SYSTOLIC BLOOD PRESSURE: 146 MMHG | HEIGHT: 66 IN | HEART RATE: 78 BPM | WEIGHT: 189 LBS | OXYGEN SATURATION: 98 % | DIASTOLIC BLOOD PRESSURE: 78 MMHG

## 2025-01-14 DIAGNOSIS — M25.512 LEFT SHOULDER PAIN: Primary | ICD-10-CM

## 2025-01-14 LAB
FINAL PATHOLOGIC DIAGNOSIS: NORMAL
GROSS: NORMAL
Lab: NORMAL
MICROSCOPIC EXAM: NORMAL
OHS QRS DURATION: 80 MS
OHS QTC CALCULATION: 407 MS

## 2025-01-14 PROCEDURE — 93005 ELECTROCARDIOGRAM TRACING: CPT

## 2025-01-14 PROCEDURE — 93010 ELECTROCARDIOGRAM REPORT: CPT | Mod: ,,, | Performed by: INTERNAL MEDICINE

## 2025-01-14 PROCEDURE — 99284 EMERGENCY DEPT VISIT MOD MDM: CPT | Mod: 25

## 2025-01-14 PROCEDURE — 63600175 PHARM REV CODE 636 W HCPCS

## 2025-01-14 PROCEDURE — 96372 THER/PROPH/DIAG INJ SC/IM: CPT

## 2025-01-14 RX ORDER — KETOROLAC TROMETHAMINE 30 MG/ML
10 INJECTION, SOLUTION INTRAMUSCULAR; INTRAVENOUS
Status: DISCONTINUED | OUTPATIENT
Start: 2025-01-14 | End: 2025-01-14

## 2025-01-14 RX ORDER — NAPROXEN 500 MG/1
500 TABLET ORAL 2 TIMES DAILY
Qty: 30 TABLET | Refills: 0 | Status: SHIPPED | OUTPATIENT
Start: 2025-01-14

## 2025-01-14 RX ORDER — KETOROLAC TROMETHAMINE 30 MG/ML
10 INJECTION, SOLUTION INTRAMUSCULAR; INTRAVENOUS
Status: COMPLETED | OUTPATIENT
Start: 2025-01-14 | End: 2025-01-14

## 2025-01-14 RX ADMIN — KETOROLAC TROMETHAMINE 10 MG: 30 INJECTION, SOLUTION INTRAMUSCULAR; INTRAVENOUS at 12:01

## 2025-01-14 NOTE — DISCHARGE INSTRUCTIONS
Take Naproxen twice a day for the next four days then take as needed  Apply warm compress to joint     Continue follow up with orthopedic specialist due to unresolved pain      Concerns of infection would be fever, joint becomes red and hot.

## 2025-01-14 NOTE — ED TRIAGE NOTES
Pt reports L shoulder pain starting last week. Pt was seen at [facility] but reports it is progressively getting worse. Pt denies any trauma

## 2025-01-14 NOTE — ED PROVIDER NOTES
Encounter Date: 1/14/2025       History     Chief Complaint   Patient presents with    Shoulder Pain     Pt c/o left shoulder pain since last week, worsened past few days.  Pain worsens with movement. Denies injury     64-year-old female presents to the emergency department with continued left shoulder pain for the past week. The patient reports that the pain worsens with movement or lifting the left upper extremity. She is accompanied by her caregiver, who states that they were seen by an outpatient orthopedic specialist last week and diagnosed with shoulder bursitis. The patient received an intra-articular steroid injection and took a Medrol Dose Pack, but there has been little improvement. When the pain becomes severe, the patient has taken tramadol without relief. There is no radiation of pain to the left upper extremity, and no numbness or paresthesia. The patient denies recent falls, chest pain, or recent use of anti-inflammatory medications.      Review of patient's allergies indicates:   Allergen Reactions    Lortab [hydrocodone-acetaminophen] Itching    Promethazine Itching and Other (See Comments)    Albuterol      Other Reaction(s): CONFUSION     Past Medical History:   Diagnosis Date    Alcoholism     c/b alcohol withdrawl seizures 7/2017    Anemia     Aortic atherosclerosis 04/17/2024    C. difficile colitis     Cancer of breast 10/2020    s/p bilateral mastectomy for  T1b N0 stage IA breast cancer October 2020    CLL (chronic lymphocytic leukemia) 09/30/2022 6/22/22 - PB flow cytometry  Immunophenotyping of peripheral blood detects a distinct kappa light chain restricted monoclonal B-cell population  (calculated at 2.44x10 9 /L, from the most recent CBC showing a total WBC of  7.35 K/uL with 61% total lymphocytes)  with a CLL phenotype (coexpression of CD19, CD5, CD23 and dim CD20). CD22 (FITC), FMC-7 and CD38 are negative in this population.    Controlled type 2 diabetes mellitus without  complication, without long-term current use of insulin 11/30/2021    COPD (chronic obstructive pulmonary disease)     Depression     Diverticulitis     Fatty liver     GERD (gastroesophageal reflux disease)     Hyperlipidemia     Hypertension     Pancreatitis     Peptic ulcer disease     Polysubstance abuse     Posterior reversible encephalopathy syndrome     Sarcoidosis of lung     over 30 yrs ago    Seizures     last seizure 2 years ago    Suicide attempt      Past Surgical History:   Procedure Laterality Date    APPENDECTOMY      BILATERAL MASTECTOMY Bilateral 10/29/2020    Procedure: MASTECTOMY, BILATERAL;  Surgeon: Baylee Kevin MD;  Location: [facility] OR [address] [place] [address];  Service: General;  Laterality: Bilateral;    BREAST REVISION SURGERY Bilateral 2/11/2021    Procedure: BREAST REVISION SURGERY;  Surgeon: Scottie Johnson MD;  Location: [facility] OR [address] [place] [address];  Service: Plastics;  Laterality: Bilateral;    COLONOSCOPY N/A 7/28/2017    Procedure: COLONOSCOPY;  Surgeon: Aaron Alvarado MD;  Location: [facility];  Service: Endoscopy;  Laterality: N/A;    COLONOSCOPY, SCREENING, HIGH RISK PATIENT N/A 1/9/2025    Procedure: COLONOSCOPY, SCREENING, HIGH RISK PATIENT;  Surgeon: Aayush Valente MD;  Location: [facility] ([address] [place] [address]);  Service: Endoscopy;  Laterality: N/A;    ESOPHAGOGASTRODUODENOSCOPY  10/7/2016, 11/6/2014    2016 - gastritis, duodenitis, 2014 erosive gastritis    ESOPHAGOGASTRODUODENOSCOPY N/A 2/11/2020    Procedure: ESOPHAGOGASTRODUODENOSCOPY (EGD);  Surgeon: Fawn Garrido MD;  Location: [facility];  Service: Endoscopy;  Laterality: N/A;    ESOPHAGOGASTRODUODENOSCOPY N/A 4/19/2021    Procedure: EGD (ESOPHAGOGASTRODUODENOSCOPY);  Surgeon: Paramjit Martino MD;  Location: [facility];  Service: Endoscopy;  Laterality: N/A;    ESOPHAGOGASTRODUODENOSCOPY N/A 1/9/2025    Procedure: EGD (ESOPHAGOGASTRODUODENOSCOPY);  Surgeon: Aayush Valente MD;  Location: [facility] ([address] [place] [address]);  Service: Endoscopy;  Laterality:  N/A;  referral dr santiago/ prep inst given in office/ pt requested sutabs/diabetic/eliquis  11/12 Precall performed. Pt needs rx called and instructions resent to josé luis   11/13/24- Rx and instructions sent as requested. DBM  11/14 pt r/s, Ok to hold Eliqui    FLEXIBLE SIGMOIDOSCOPY  11/06/2014    colitis    HYSTERECTOMY      IMPLANTATION OF PERMANENT SACRAL NERVE STIMULATOR N/A 7/12/2022    Procedure: INSERTION, NEUROSTIMULATOR, PERMANENT, SACRAL;  Surgeon: Juaquin Edwards MD;  Location: [facility] OR [address] [place] [address];  Service: Urology;  Laterality: N/A;  1hr    INJECTION FOR SENTINEL NODE IDENTIFICATION Right 10/29/2020    Procedure: INJECTION, FOR SENTINEL NODE IDENTIFICATION;  Surgeon: Baylee Kevin MD;  Location: [address];  Service: General;  Laterality: Right;    INJECTION OF JOINT Right 10/10/2019    Procedure: Injection, Joint RIGHT ILIOPSOAS BURSA/TENDON INJECTION AND RIGHT GLUTEAL TENDON INJECTION WITH STEROID AND LIDOCAINE;  Surgeon: Guillaume Rico MD;  Location: [facility];  Service: Pain Management;  Laterality: Right;  NEEDS CONSENT    INSERTION OF BREAST TISSUE EXPANDER Bilateral 10/29/2020    Procedure: INSERTION, TISSUE EXPANDER, BREAST;  Surgeon: Scottie Johnson MD;  Location: [address];  Service: Plastics;  Laterality: Bilateral;  Right breast: 1082 g  Left breast: 1076 g    LIPOSUCTION Bilateral 2/11/2021    Procedure: LIPOSUCTION;  Surgeon: Scottie Johnson MD;  Location: [address];  Service: Plastics;  Laterality: Bilateral;    mediastenoscopy      REPLACEMENT OF IMPLANT OF BREAST Bilateral 2/11/2021    Procedure: REPLACEMENT, IMPLANT, BREAST;  Surgeon: Scottie Johnson MD;  Location: [address];  Service: Plastics;  Laterality: Bilateral;    SENTINEL LYMPH NODE BIOPSY Right 10/29/2020    Procedure: BIOPSY, LYMPH NODE, SENTINEL;  Surgeon: Baylee Kevin MD;  Location: [address];  Service: General;  Laterality: Right;    TONSILLECTOMY N/A 1970     TUBAL LIGATION       Family History   Problem Relation Name Age of Onset    Heart attack Father      Diabetes Father      Hypertension Father      Diabetes Mother      Hypertension Mother      Breast cancer Maternal Aunt      Colon cancer Maternal Uncle      Breast cancer Daughter      Ovarian cancer Neg Hx      Cancer Neg Hx       Social History     Tobacco Use    Smoking status: Every Day     Current packs/day: 0.00     Average packs/day: 0.5 packs/day for 30.0 years (15.0 ttl pk-yrs)     Types: Vaping with nicotine, Cigarettes     Start date: 2/1/1991     Last attempt to quit: 2/1/2021     Years since quitting: 3.9    Smokeless tobacco: Never    Tobacco comments:     Patient is currently smoking 10 cigarettes a day, declines nicotine patches   Substance Use Topics    Alcohol use: Not Currently     Comment: vodka daily (half a regular bottle) for 4 days    Drug use: Not Currently     Types: Marijuana     Comment: gummies     Review of Systems  See HPI   Physical Exam     Initial Vitals [01/14/25 1048]   BP Pulse Resp Temp SpO2   129/60 76 16 97.7 °F (36.5 °C) (!) 94 %      MAP       --         Physical Exam    Vitals reviewed.  Constitutional: She appears well-developed and well-nourished.   HENT:   Head: Normocephalic and atraumatic.   Eyes: Conjunctivae and EOM are normal.   Neck:   Normal range of motion.  Cardiovascular:  Normal rate.           Pulmonary/Chest: No respiratory distress.   Abdominal: Abdomen is soft. She exhibits no distension.   Musculoskeletal:      Cervical back: Normal range of motion.      Comments: Restricted ROM of left shoulder with active rom, but able to perform passive ROM.   No overlying skin changes such as erythema, induration or effusion.   Reproducible tenderness primarily on anterior aspect of the shoulder.      Neurological: She is alert and oriented to person, place, and time.   Skin: Skin is warm and dry.   Psychiatric: She has a normal mood and affect. Thought content  normal.         ED Course   Procedures  Labs Reviewed - No data to display       Imaging Results    None          Medications   ketorolac injection 9.999 mg (9.999 mg Intramuscular Given 1/14/25 1246)     Medical Decision Making  64 year ld female known diagnosis of left shoulder bursitis presents due to due to continued pain. Recently completed medrol Dosepak, minimal improvement. Takes tramadol occasional, allergy to opioids. Exam not remarkable for gout or septic arthritis. Pt and caregiver declines imaging as previously performed last week at specialist office.   EKG sinus brigette 59 bpm no concern of ischemia, pain consistent with MSK vs cardiac.   Given Toradol injection and naproxen.   She will follow up with Ortho specialist  Discussed return precautions.     Risk  Prescription drug management.                                      Clinical Impression:  Final diagnoses:  [M25.512] Left shoulder pain (Primary)          ED Disposition Condition    Discharge Stable          ED Prescriptions       Medication Sig Dispense Start Date End Date Auth. Provider    naproxen (NAPROSYN) 500 MG tablet Take 1 tablet (500 mg total) by mouth 2 (two) times a day. 30 tablet 1/14/2025 -- Paola Bella PA-C          Follow-up Information    None          Paola Bella PA-C  01/14/25 2282

## 2025-01-14 NOTE — PROGRESS NOTES
Earl, your biopsy results are not very significant (no H pylori infection in the stomach, no celiac disease in your small bowel, and no inflammation in the colon).     Please contact me if you have any additional concerns.    Sincerely,    Nima Johnson

## 2025-01-14 NOTE — FIRST PROVIDER EVALUATION
Emergency Department TeleTriage Encounter Note      CHIEF COMPLAINT    Chief Complaint   Patient presents with    Shoulder Pain     Pt c/o left shoulder pain since last week, worsened past few days.  Pain worsens with movement. Denies injury       VITAL SIGNS   Initial Vitals [01/14/25 1048]   BP Pulse Resp Temp SpO2   129/60 76 16 97.7 °F (36.5 °C) (!) 94 %      MAP       --            ALLERGIES    Review of patient's allergies indicates:   Allergen Reactions    Lortab [hydrocodone-acetaminophen] Itching    Promethazine Itching and Other (See Comments)    Albuterol      Other Reaction(s): CONFUSION       PROVIDER TRIAGE NOTE  Verbal consent for the teletriage evaluation was given by the patient at the start of the evaluation.  All efforts will be made to maintain patient's privacy during the evaluation.      This is a teletriage evaluation of a 64 y.o. female presenting to the ED with c/o left shoulder pain for 1 week. Seen last week and diagnosed with bursitis.  Limited physical exam via telehealth: The patient is awake, alert, answering questions appropriately and is not in respiratory distress.  As the Teletriage provider, I performed an initial assessment and ordered appropriate labs and imaging studies, if any, to facilitate the patient's care once placed in the ED. Once a room is available, care and a full evaluation will be completed by an alternate ED provider.  Any additional orders and the final disposition will be determined by that provider.  All imaging and labs will not be followed-up by the Teletriage Team, including myself.          ORDERS  Labs Reviewed - No data to display    ED Orders (720h ago, onward)      Start Ordered     Status Ordering Provider    01/14/25 1050 01/14/25 1050  EKG 12-lead  Once         Completed by HERBERT RAZO on 1/14/2025 at 10:55 AM ANN HUNTER III    Unscheduled 01/14/25 1141  X-Ray Shoulder Trauma Left  1 time imaging         Ordered CASEY CRENSHAW     Unscheduled 01/14/25 1141  EKG 12-lead  Once         Ordered CASEY CRENSHAW              Virtual Visit Note: The provider triage portion of this emergency department evaluation and documentation was performed via "[employer] a HIPAA-compliant telemedicine application, in concert with a tele-presenter in the room. A face to face patient evaluation with one of my colleagues will occur once the patient is placed in an emergency department room.      DISCLAIMER: This note was prepared with [employer] voice recognition transcription software. Garbled syntax, mangled pronouns, and other bizarre constructions may be attributed to that software system.

## 2025-01-19 ENCOUNTER — PATIENT MESSAGE (OUTPATIENT)
Dept: REHABILITATION | Facility: OTHER | Age: 65
End: 2025-01-19
Payer: COMMERCIAL

## 2025-01-29 ENCOUNTER — PATIENT MESSAGE (OUTPATIENT)
Dept: ALLERGY | Facility: CLINIC | Age: 65
End: 2025-01-29
Payer: COMMERCIAL

## 2025-01-29 ENCOUNTER — TELEPHONE (OUTPATIENT)
Dept: ALLERGY | Facility: CLINIC | Age: 65
End: 2025-01-29
Payer: COMMERCIAL

## 2025-01-29 NOTE — TELEPHONE ENCOUNTER
Spoke with patients  who was not with patient. Patient has dementia and would be unable to schedule by herself.  opted to have portal message sent to work on scheduling together with patient.

## 2025-01-29 NOTE — TELEPHONE ENCOUNTER
----- Message from Summer sent at 1/29/2025  9:15 AM CST -----  .Type:  Patient Call Back    Who Called: PT  TIFF       Does the patient know what this is regarding?: HE CALLED TO SCHEDULE TO A NP VISIT TO SEE IF SHE HAS FOOD ALLERGIES. PT EXPERIENCING DIARRHEA BLOATED STOMACH LACK OF ENERGY AND NAUSEA. PT RECENTLY HAD A COLONOSCOPY.     Would the patient rather a call back YES     Best Call Back Number:[phone]    Additional Information: Thank You

## 2025-02-12 ENCOUNTER — LAB VISIT (OUTPATIENT)
Dept: LAB | Facility: HOSPITAL | Age: 65
End: 2025-02-12
Payer: COMMERCIAL

## 2025-02-12 ENCOUNTER — OFFICE VISIT (OUTPATIENT)
Dept: GASTROENTEROLOGY | Facility: CLINIC | Age: 65
End: 2025-02-12
Payer: COMMERCIAL

## 2025-02-12 VITALS
DIASTOLIC BLOOD PRESSURE: 76 MMHG | SYSTOLIC BLOOD PRESSURE: 121 MMHG | HEIGHT: 66 IN | HEART RATE: 86 BPM | WEIGHT: 199.5 LBS | BODY MASS INDEX: 32.06 KG/M2

## 2025-02-12 DIAGNOSIS — K58.0 IRRITABLE BOWEL SYNDROME WITH DIARRHEA: ICD-10-CM

## 2025-02-12 DIAGNOSIS — K74.60 HEPATIC CIRRHOSIS, UNSPECIFIED HEPATIC CIRRHOSIS TYPE, UNSPECIFIED WHETHER ASCITES PRESENT: ICD-10-CM

## 2025-02-12 DIAGNOSIS — F10.20 ALCOHOL USE DISORDER, SEVERE, DEPENDENCE: ICD-10-CM

## 2025-02-12 DIAGNOSIS — R10.9 ABDOMINAL PAIN, UNSPECIFIED ABDOMINAL LOCATION: ICD-10-CM

## 2025-02-12 DIAGNOSIS — K74.60 HEPATIC CIRRHOSIS, UNSPECIFIED HEPATIC CIRRHOSIS TYPE, UNSPECIFIED WHETHER ASCITES PRESENT: Primary | ICD-10-CM

## 2025-02-12 LAB
AFP SERPL-MCNC: 4.3 NG/ML (ref 0–8.4)
ALBUMIN SERPL BCP-MCNC: 4.1 G/DL (ref 3.5–5.2)
ALP SERPL-CCNC: 56 U/L (ref 40–150)
ALT SERPL W/O P-5'-P-CCNC: 15 U/L (ref 10–44)
ANION GAP SERPL CALC-SCNC: 11 MMOL/L (ref 8–16)
AST SERPL-CCNC: 16 U/L (ref 10–40)
BASOPHILS NFR BLD: 0 % (ref 0–1.9)
BILIRUB SERPL-MCNC: 0.4 MG/DL (ref 0.1–1)
BUN SERPL-MCNC: 14 MG/DL (ref 8–23)
CALCIUM SERPL-MCNC: 9.3 MG/DL (ref 8.7–10.5)
CHLORIDE SERPL-SCNC: 101 MMOL/L (ref 95–110)
CO2 SERPL-SCNC: 26 MMOL/L (ref 23–29)
CREAT SERPL-MCNC: 1 MG/DL (ref 0.5–1.4)
DIFFERENTIAL METHOD BLD: ABNORMAL
EOSINOPHIL NFR BLD: 0 % (ref 0–8)
ERYTHROCYTE [DISTWIDTH] IN BLOOD BY AUTOMATED COUNT: 16 % (ref 11.5–14.5)
EST. GFR  (NO RACE VARIABLE): >60 ML/MIN/1.73 M^2
FERRITIN SERPL-MCNC: 15 NG/ML (ref 20–300)
GLUCOSE SERPL-MCNC: 102 MG/DL (ref 70–110)
HCT VFR BLD AUTO: 41.3 % (ref 37–48.5)
HGB BLD-MCNC: 12.2 G/DL (ref 12–16)
IGG SERPL-MCNC: 820 MG/DL (ref 650–1600)
IMM GRANULOCYTES # BLD AUTO: ABNORMAL K/UL (ref 0–0.04)
IMM GRANULOCYTES NFR BLD AUTO: ABNORMAL % (ref 0–0.5)
INR PPP: 1 (ref 0.8–1.2)
IRON SERPL-MCNC: 89 UG/DL (ref 30–160)
LIPASE SERPL-CCNC: 20 U/L (ref 4–60)
LYMPHOCYTES NFR BLD: 79 % (ref 18–48)
MCH RBC QN AUTO: 24.3 PG (ref 27–31)
MCHC RBC AUTO-ENTMCNC: 29.5 G/DL (ref 32–36)
MCV RBC AUTO: 82 FL (ref 82–98)
MONOCYTES NFR BLD: 0 % (ref 4–15)
NEUTROPHILS NFR BLD: 21 % (ref 38–73)
NRBC BLD-RTO: 0 /100 WBC
PLATELET # BLD AUTO: 170 K/UL (ref 150–450)
PLATELET BLD QL SMEAR: ABNORMAL
PMV BLD AUTO: 11.3 FL (ref 9.2–12.9)
POTASSIUM SERPL-SCNC: 4.1 MMOL/L (ref 3.5–5.1)
PROT SERPL-MCNC: 7.6 G/DL (ref 6–8.4)
PROTHROMBIN TIME: 10.8 SEC (ref 9–12.5)
RBC # BLD AUTO: 5.02 M/UL (ref 4–5.4)
SMUDGE CELLS BLD QL SMEAR: PRESENT
SODIUM SERPL-SCNC: 138 MMOL/L (ref 136–145)
TRANSFERRIN SERPL-MCNC: 382 MG/DL (ref 200–375)
WBC # BLD AUTO: 16.65 K/UL (ref 3.9–12.7)

## 2025-02-12 PROCEDURE — 86038 ANTINUCLEAR ANTIBODIES: CPT | Performed by: STUDENT IN AN ORGANIZED HEALTH CARE EDUCATION/TRAINING PROGRAM

## 2025-02-12 PROCEDURE — 80053 COMPREHEN METABOLIC PANEL: CPT | Performed by: STUDENT IN AN ORGANIZED HEALTH CARE EDUCATION/TRAINING PROGRAM

## 2025-02-12 PROCEDURE — 99214 OFFICE O/P EST MOD 30 MIN: CPT | Mod: S$GLB,,, | Performed by: STUDENT IN AN ORGANIZED HEALTH CARE EDUCATION/TRAINING PROGRAM

## 2025-02-12 PROCEDURE — 1101F PT FALLS ASSESS-DOCD LE1/YR: CPT | Mod: CPTII,S$GLB,, | Performed by: STUDENT IN AN ORGANIZED HEALTH CARE EDUCATION/TRAINING PROGRAM

## 2025-02-12 PROCEDURE — 86039 ANTINUCLEAR ANTIBODIES (ANA): CPT | Performed by: STUDENT IN AN ORGANIZED HEALTH CARE EDUCATION/TRAINING PROGRAM

## 2025-02-12 PROCEDURE — 84466 ASSAY OF TRANSFERRIN: CPT | Performed by: STUDENT IN AN ORGANIZED HEALTH CARE EDUCATION/TRAINING PROGRAM

## 2025-02-12 PROCEDURE — 3288F FALL RISK ASSESSMENT DOCD: CPT | Mod: CPTII,S$GLB,, | Performed by: STUDENT IN AN ORGANIZED HEALTH CARE EDUCATION/TRAINING PROGRAM

## 2025-02-12 PROCEDURE — 80321 ALCOHOLS BIOMARKERS 1OR 2: CPT | Performed by: STUDENT IN AN ORGANIZED HEALTH CARE EDUCATION/TRAINING PROGRAM

## 2025-02-12 PROCEDURE — 99999 PR PBB SHADOW E&M-EST. PATIENT-LVL V: CPT | Mod: PBBFAC,,, | Performed by: STUDENT IN AN ORGANIZED HEALTH CARE EDUCATION/TRAINING PROGRAM

## 2025-02-12 PROCEDURE — 82105 ALPHA-FETOPROTEIN SERUM: CPT | Performed by: STUDENT IN AN ORGANIZED HEALTH CARE EDUCATION/TRAINING PROGRAM

## 2025-02-12 PROCEDURE — 3078F DIAST BP <80 MM HG: CPT | Mod: CPTII,S$GLB,, | Performed by: STUDENT IN AN ORGANIZED HEALTH CARE EDUCATION/TRAINING PROGRAM

## 2025-02-12 PROCEDURE — 82104 ALPHA-1-ANTITRYPSIN PHENO: CPT | Performed by: STUDENT IN AN ORGANIZED HEALTH CARE EDUCATION/TRAINING PROGRAM

## 2025-02-12 PROCEDURE — 83690 ASSAY OF LIPASE: CPT | Performed by: STUDENT IN AN ORGANIZED HEALTH CARE EDUCATION/TRAINING PROGRAM

## 2025-02-12 PROCEDURE — 1159F MED LIST DOCD IN RCRD: CPT | Mod: CPTII,S$GLB,, | Performed by: STUDENT IN AN ORGANIZED HEALTH CARE EDUCATION/TRAINING PROGRAM

## 2025-02-12 PROCEDURE — 83540 ASSAY OF IRON: CPT | Performed by: STUDENT IN AN ORGANIZED HEALTH CARE EDUCATION/TRAINING PROGRAM

## 2025-02-12 PROCEDURE — 85007 BL SMEAR W/DIFF WBC COUNT: CPT | Performed by: STUDENT IN AN ORGANIZED HEALTH CARE EDUCATION/TRAINING PROGRAM

## 2025-02-12 PROCEDURE — 85027 COMPLETE CBC AUTOMATED: CPT | Performed by: STUDENT IN AN ORGANIZED HEALTH CARE EDUCATION/TRAINING PROGRAM

## 2025-02-12 PROCEDURE — 3074F SYST BP LT 130 MM HG: CPT | Mod: CPTII,S$GLB,, | Performed by: STUDENT IN AN ORGANIZED HEALTH CARE EDUCATION/TRAINING PROGRAM

## 2025-02-12 PROCEDURE — 86381 MITOCHONDRIAL ANTIBODY EACH: CPT | Performed by: STUDENT IN AN ORGANIZED HEALTH CARE EDUCATION/TRAINING PROGRAM

## 2025-02-12 PROCEDURE — 86015 ACTIN ANTIBODY EACH: CPT | Performed by: STUDENT IN AN ORGANIZED HEALTH CARE EDUCATION/TRAINING PROGRAM

## 2025-02-12 PROCEDURE — 1125F AMNT PAIN NOTED PAIN PRSNT: CPT | Mod: CPTII,S$GLB,, | Performed by: STUDENT IN AN ORGANIZED HEALTH CARE EDUCATION/TRAINING PROGRAM

## 2025-02-12 PROCEDURE — 82784 ASSAY IGA/IGD/IGG/IGM EACH: CPT | Performed by: STUDENT IN AN ORGANIZED HEALTH CARE EDUCATION/TRAINING PROGRAM

## 2025-02-12 PROCEDURE — 86235 NUCLEAR ANTIGEN ANTIBODY: CPT | Mod: 59 | Performed by: STUDENT IN AN ORGANIZED HEALTH CARE EDUCATION/TRAINING PROGRAM

## 2025-02-12 PROCEDURE — 3008F BODY MASS INDEX DOCD: CPT | Mod: CPTII,S$GLB,, | Performed by: STUDENT IN AN ORGANIZED HEALTH CARE EDUCATION/TRAINING PROGRAM

## 2025-02-12 PROCEDURE — 82728 ASSAY OF FERRITIN: CPT | Performed by: STUDENT IN AN ORGANIZED HEALTH CARE EDUCATION/TRAINING PROGRAM

## 2025-02-12 PROCEDURE — 85610 PROTHROMBIN TIME: CPT | Performed by: STUDENT IN AN ORGANIZED HEALTH CARE EDUCATION/TRAINING PROGRAM

## 2025-02-12 RX ORDER — OMEPRAZOLE 20 MG/1
20 CAPSULE, DELAYED RELEASE ORAL DAILY
Qty: 30 CAPSULE | Refills: 0 | Status: SHIPPED | OUTPATIENT
Start: 2025-02-12

## 2025-02-12 RX ORDER — MODAFINIL 100 MG/1
100 TABLET ORAL EVERY MORNING
COMMUNITY
Start: 2025-01-23

## 2025-02-12 RX ORDER — DICYCLOMINE HYDROCHLORIDE 10 MG/1
10 CAPSULE ORAL 3 TIMES DAILY PRN
Qty: 90 CAPSULE | Refills: 0 | Status: SHIPPED | OUTPATIENT
Start: 2025-02-12 | End: 2025-03-14

## 2025-02-12 NOTE — PROGRESS NOTES
[facility]    Reason for visit: The primary encounter diagnosis was Hepatic cirrhosis, unspecified hepatic cirrhosis type, unspecified whether ascites present. Diagnoses of Irritable bowel syndrome with diarrhea and Abdominal pain, unspecified abdominal location were also pertinent to this visit.  Referring Provider/PCP: Andrew Rodriguez MD    History of Present Illness:  Earl Abdul is a 64 y.o. female with a history of HTN, hypothyroidism, CLL, vertigo, alcohol related pancreatitis, C diff infection, and DM2 who is presenting for follow-up evaluation of diarrhea.     She saw Dr. Acevedo for diarrhea & fecal incontinence in April 2022. Labs including testing for celiac disease were normal.  She had worsening right upper quadrant abdominal pain, nausea, vomiting and diarrhea.      Visited the ER on 10/30/2024.  Labs WNL, including CBC, CMP, Lipase. CT abdomen pelvis with contrast showed no acute process in the abdomen or pelvis; widespread lymphadenopathy throughout the pelvis and splenomegaly consistent with a history of CLL. Her symptoms were attributed to viral enteritis and she was discharged home with PO Zofran.     Today, she reports having diarrhea 3 to 4 times day, Blair stool scale 6-7, nonbloody, and variable volume.  This is accompanied by episodes of fecal incontinence, for which she has to wear adult diapers.  Her diarrhea began 6 weeks ago but she can not recollect any inciting factors (international travel, sick contacts, or raw seafood).    The patient's smokes 5-6 cigarettes daily.  Reports last drink of alcohol was over 2 months ago.    Interval Hx (2/12/25):  After her last visit, C diff testing revealed active infection.She was treated with a PO vancomycin taper. She was having cramping abdominal pain for which we prescribed PO Bentyl. X ray KUB r/o bowel dilation or obstruction. Her nausea & vomiting worsened in spite of Zofran, scopolamine patch, and  "Compazine.     In the interim, EGD EGD/Cscope (with biopsies) largely negative apart from increased intra-epithelial lymphocytes (but celiac serologies WNL).     The patient reports having near constant nausea in spite of using scopolamine patch and p.o. Zofran.  She does not always vomit.  She strongly denies the use of marijuana.    She has also been having between 4-10 loose, nonbloody bowel movements daily.  These are accompanied by cramping abdominal pain.  Denies fever, chills, rectal bleeding, or unexplained weight loss.  She no longer takes the Questran but is taking p.r.n. Imodium (up to 3 times a day).      PEndoHx:  Colonoscopy from 2020 reviewed.     EGD (1/9/25):                         - Normal esophagus.                          - Z-line regular, 40 cm from the incisors.                          - Mucosal changes suspicious for gastritis.                          Biopsied (-ve for H pylori).                          - Normal examined duodenum. Biopsied (increased IELs but no villous                            blunting).     Colonoscopy (1/9/25):                         - The examined portion of the ileum was normal.                          - Anal papilla(e) were hypertrophied.                          - The examination was otherwise normal on direct                          and retroflexion views.                          - Biopsies were taken with a cold forceps from the                          ascending colon, transverse colon and descending                          colon for evaluation of microscopic colitis.     Family Hx:  Patient's mother was diagnosed with Crohn's disease in her 50s.  She reportedly required 4 bowel surgeries and had an ostomy at 1 point.  Patient states that 16" of small bowel was removed.    Review of Systems   Constitutional:  Negative for chills, fever and weight loss.   Eyes:  Negative for pain and redness.   Cardiovascular:  Negative for chest pain and leg swelling. "   Gastrointestinal:  Positive for abdominal pain, diarrhea and nausea. Negative for blood in stool and melena.   Musculoskeletal:  Positive for joint pain.   Skin:  Negative for itching and rash.       Physical Exam:  Constitutional: obese,  alert, in mild distress, and well developed  HENT: Head: Normal, normocephalic, atraumatic.    Eyes: conjunctiva clear and sclera nonicteric  GI:  Soft, moderate tenderness to palpation in right upper quadrant.  No peritoneal signs.  Skin: normal color and no jaundice.  No spider nevi or palmar erythema.  Neurological: alert, oriented x3  speech normal in context and clarity  memory intact grossly  Psychiatric: mood and affect are within normal limits, pt is a good historian; no memory problems were noted    Laboratory:  Lab Results   Component Value Date     12/18/2024    K 4.4 12/18/2024     12/18/2024    CO2 22 (L) 12/18/2024    BUN 10 12/18/2024    CREATININE 1.0 12/18/2024    CALCIUM 9.1 12/18/2024    ANIONGAP 10 12/18/2024    ESTGFRAFRICA >60.0 06/22/2022    EGFRNONAA >60.0 06/22/2022       Lab Results   Component Value Date    ALT 15 12/18/2024    AST 19 12/18/2024     (H) 04/01/2011    ALKPHOS 60 12/18/2024    BILITOT 0.3 12/18/2024    ALBUMIN 3.8 12/18/2024    LIPASE 17 10/22/2024       Lab Results   Component Value Date    WBC 12.30 12/18/2024    HGB 10.4 (L) 12/18/2024    HCT 35.1 (L) 12/18/2024    MCV 82 12/18/2024     (L) 12/18/2024       Microbiology:  - C diff infection in 2014    Imaging:  - reviewed pertinent imaging the last 6 months    Assessment:  Earl Abdul is a 64 y.o. female who is presenting for follow-up evaluation of subacute diarrhea & abdominal pain. After her last visit, EGD/Cscope (with biopsies) largely negative apart from increased intra-epithelial lymphocytes (but celiac serologies WNL). C diff infection was treated with PO vancomycin taper. Had intractable nausea and vomiting, which we have been unable to  control.     I suspect the patient has now developed IBS-D. No etiology can be found for her vomiting. More concerning is the cirrhotic appearance of her liver on imaging. She has had a long hx of alcohol use disorder & has undergone rehab several times.       Problems:  Persistent diarrhea - postinfectious IBS  ?SIBO  Remote history alcohol-related pancreatitis  Concern for underlying cirrhosis, likely MetALD  C diff infection x2, resolved  Polypharmacy  Alcohol use disorder      Plan:  For abdominal pain: Repeat CT scan today.   For IBS-D: Start a 14 day course of Xifaxin TID. This will also provide empiric treatment for SIBO.   Will refer to Hepatology for concern of underlying cirrhosis. Will order serological workup (although EtOH is the likely cause). Counseled on strict EtOH cessation.  Instructed the patient to quit smoking, and avoid all NSAIDs.  Next colonoscopy in 2035.  Follow up in about 4 weeks (around 3/12/2025).    Nima Johnson MD  Gastroenterology Fellow    Orders Placed This Encounter   Procedures    FibroScan [place] (Vibration Controlled Transient Elastography)    CT Abdomen Pelvis With IV Contrast Routine Oral Contrast    COMPREHENSIVE METABOLIC PANEL    Phosphatidylethanol (PETH)    CBC W/ AUTO DIFFERENTIAL    Protime-INR    ROYCE    Anti-Smooth Muscle Antibody    Antimitochondrial Antibody    IgG    Alpha 1 Antitrypsin Phenotype    Ferritin    Transferrin    Iron    AFP TUMOR MARKER    Lipase    Ambulatory referral/consult to Hepatology

## 2025-02-12 NOTE — PATIENT INSTRUCTIONS
- Please get blood work done today.  - CT scan today.    - Schedule Fibroscan to look at liver stiffness.   - Follow up in liver clinic for cirrhosis.   - Collect Xifaxin from pharmacy downstairs.   - Eat a low salt diet.   - Strictly no alcohol.

## 2025-02-13 ENCOUNTER — TELEPHONE (OUTPATIENT)
Dept: GASTROENTEROLOGY | Facility: CLINIC | Age: 65
End: 2025-02-13
Payer: COMMERCIAL

## 2025-02-13 ENCOUNTER — PATIENT MESSAGE (OUTPATIENT)
Dept: HEPATOLOGY | Facility: CLINIC | Age: 65
End: 2025-02-13
Payer: COMMERCIAL

## 2025-02-13 ENCOUNTER — HOSPITAL ENCOUNTER (EMERGENCY)
Facility: HOSPITAL | Age: 65
Discharge: HOME | End: 2025-02-14
Attending: EMERGENCY MEDICINE
Payer: COMMERCIAL

## 2025-02-13 ENCOUNTER — PATIENT MESSAGE (OUTPATIENT)
Dept: INTERNAL MEDICINE | Facility: CLINIC | Age: 65
End: 2025-02-13
Payer: COMMERCIAL

## 2025-02-13 DIAGNOSIS — J44.1 COPD EXACERBATION: Primary | ICD-10-CM

## 2025-02-13 DIAGNOSIS — R06.02 SHORTNESS OF BREATH: ICD-10-CM

## 2025-02-13 DIAGNOSIS — R07.9 CHEST PAIN: ICD-10-CM

## 2025-02-13 DIAGNOSIS — R42 VERTIGO: ICD-10-CM

## 2025-02-13 DIAGNOSIS — C91.10 CLL (CHRONIC LYMPHOCYTIC LEUKEMIA): ICD-10-CM

## 2025-02-13 DIAGNOSIS — R11.0 NAUSEA: ICD-10-CM

## 2025-02-13 LAB
ALBUMIN SERPL BCP-MCNC: 4.1 G/DL (ref 3.5–5.2)
ALP SERPL-CCNC: 49 U/L (ref 40–150)
ALT SERPL W/O P-5'-P-CCNC: 16 U/L (ref 10–44)
ANA PATTERN 1: NORMAL
ANA SER QL IF: POSITIVE
ANA TITR SER IF: NORMAL {TITER}
ANION GAP SERPL CALC-SCNC: 10 MMOL/L (ref 8–16)
ANISOCYTOSIS BLD QL SMEAR: SLIGHT
AST SERPL-CCNC: 22 U/L (ref 10–40)
BASOPHILS NFR BLD: 1 % (ref 0–1.9)
BILIRUB SERPL-MCNC: 0.4 MG/DL (ref 0.1–1)
BIPAP: 0
BNP SERPL-MCNC: <10 PG/ML (ref 0–99)
BUN SERPL-MCNC: 13 MG/DL (ref 8–23)
CALCIUM SERPL-MCNC: 9.4 MG/DL (ref 8.7–10.5)
CHLORIDE SERPL-SCNC: 104 MMOL/L (ref 95–110)
CO2 SERPL-SCNC: 25 MMOL/L (ref 23–29)
CREAT SERPL-MCNC: 1 MG/DL (ref 0.5–1.4)
DIFFERENTIAL METHOD BLD: ABNORMAL
EOSINOPHIL NFR BLD: 0 % (ref 0–8)
ERYTHROCYTE [DISTWIDTH] IN BLOOD BY AUTOMATED COUNT: 15.9 % (ref 11.5–14.5)
EST. GFR  (NO RACE VARIABLE): >60 ML/MIN/1.73 M^2
FIO2: 21 %
GLUCOSE SERPL-MCNC: 89 MG/DL (ref 70–110)
HCT VFR BLD AUTO: 36.9 % (ref 37–48.5)
HGB BLD-MCNC: 10.8 G/DL (ref 12–16)
HYPOCHROMIA BLD QL SMEAR: ABNORMAL
IMM GRANULOCYTES # BLD AUTO: ABNORMAL K/UL (ref 0–0.04)
IMM GRANULOCYTES NFR BLD AUTO: ABNORMAL % (ref 0–0.5)
INFLUENZA A, MOLECULAR: NEGATIVE
INFLUENZA B, MOLECULAR: NEGATIVE
LDH SERPL L TO P-CCNC: 0.5 MMOL/L
LIPASE SERPL-CCNC: 14 U/L (ref 4–60)
LYMPHOCYTES NFR BLD: 66 % (ref 18–48)
MCH RBC QN AUTO: 24.1 PG (ref 27–31)
MCHC RBC AUTO-ENTMCNC: 29.3 G/DL (ref 32–36)
MCV RBC AUTO: 82 FL (ref 82–98)
MITOCHONDRIA AB TITR SER IF: NORMAL {TITER}
MONOCYTES NFR BLD: 3 % (ref 4–15)
NEUTROPHILS NFR BLD: 30 % (ref 38–73)
NRBC BLD-RTO: 0 /100 WBC
OVALOCYTES BLD QL SMEAR: ABNORMAL
PLATELET # BLD AUTO: 104 K/UL (ref 150–450)
PLATELET BLD QL SMEAR: ABNORMAL
PMV BLD AUTO: 10.8 FL (ref 9.2–12.9)
POC PERFORMED BY: NORMAL
POC TEMPERATURE: 37 C
POIKILOCYTOSIS BLD QL SMEAR: SLIGHT
POLYCHROMASIA BLD QL SMEAR: ABNORMAL
POTASSIUM SERPL-SCNC: 4.4 MMOL/L (ref 3.5–5.1)
PROT SERPL-MCNC: 7 G/DL (ref 6–8.4)
RBC # BLD AUTO: 4.48 M/UL (ref 4–5.4)
SARS-COV-2 RDRP RESP QL NAA+PROBE: NEGATIVE
SMOOTH MUSCLE AB TITR SER IF: NORMAL {TITER}
SMUDGE CELLS BLD QL SMEAR: PRESENT
SODIUM SERPL-SCNC: 139 MMOL/L (ref 136–145)
SPECIMEN SOURCE: NORMAL
SPECIMEN SOURCE: NORMAL
TARGETS BLD QL SMEAR: ABNORMAL
TROPONIN I SERPL DL<=0.01 NG/ML-MCNC: <3 NG/L (ref 0–14)
TROPONIN I SERPL DL<=0.01 NG/ML-MCNC: <3 NG/L (ref 0–14)
WBC # BLD AUTO: 11.17 K/UL (ref 3.9–12.7)

## 2025-02-13 PROCEDURE — 84484 ASSAY OF TROPONIN QUANT: CPT

## 2025-02-13 PROCEDURE — 25500020 PHARM REV CODE 255: Performed by: EMERGENCY MEDICINE

## 2025-02-13 PROCEDURE — 99900035 HC TECH TIME PER 15 MIN (STAT)

## 2025-02-13 PROCEDURE — 85007 BL SMEAR W/DIFF WBC COUNT: CPT

## 2025-02-13 PROCEDURE — 99285 EMERGENCY DEPT VISIT HI MDM: CPT | Mod: 25

## 2025-02-13 PROCEDURE — 93005 ELECTROCARDIOGRAM TRACING: CPT

## 2025-02-13 PROCEDURE — 83605 ASSAY OF LACTIC ACID: CPT

## 2025-02-13 PROCEDURE — 80053 COMPREHEN METABOLIC PANEL: CPT

## 2025-02-13 PROCEDURE — 96361 HYDRATE IV INFUSION ADD-ON: CPT

## 2025-02-13 PROCEDURE — 87635 SARS-COV-2 COVID-19 AMP PRB: CPT

## 2025-02-13 PROCEDURE — 83880 ASSAY OF NATRIURETIC PEPTIDE: CPT

## 2025-02-13 PROCEDURE — 96374 THER/PROPH/DIAG INJ IV PUSH: CPT

## 2025-02-13 PROCEDURE — 87040 BLOOD CULTURE FOR BACTERIA: CPT

## 2025-02-13 PROCEDURE — 87502 INFLUENZA DNA AMP PROBE: CPT

## 2025-02-13 PROCEDURE — 85027 COMPLETE CBC AUTOMATED: CPT

## 2025-02-13 PROCEDURE — 63600175 PHARM REV CODE 636 W HCPCS

## 2025-02-13 PROCEDURE — 25000003 PHARM REV CODE 250

## 2025-02-13 PROCEDURE — 83690 ASSAY OF LIPASE: CPT

## 2025-02-13 RX ORDER — ONDANSETRON HYDROCHLORIDE 2 MG/ML
4 INJECTION, SOLUTION INTRAVENOUS
Status: COMPLETED | OUTPATIENT
Start: 2025-02-13 | End: 2025-02-13

## 2025-02-13 RX ORDER — PROCHLORPERAZINE MALEATE 5 MG
5 TABLET ORAL 4 TIMES DAILY PRN
Qty: 24 TABLET | Refills: 0 | Status: SHIPPED | OUTPATIENT
Start: 2025-02-13

## 2025-02-13 RX ORDER — PREDNISONE 20 MG/1
40 TABLET ORAL DAILY
Qty: 8 TABLET | Refills: 0 | Status: SHIPPED | OUTPATIENT
Start: 2025-02-13 | End: 2025-02-17

## 2025-02-13 RX ADMIN — SODIUM CHLORIDE 1000 ML: 9 INJECTION, SOLUTION INTRAVENOUS at 06:02

## 2025-02-13 RX ADMIN — IOHEXOL 100 ML: 350 INJECTION, SOLUTION INTRAVENOUS at 09:02

## 2025-02-13 RX ADMIN — ONDANSETRON 4 MG: 2 INJECTION INTRAMUSCULAR; INTRAVENOUS at 06:02

## 2025-02-13 NOTE — TELEPHONE ENCOUNTER
----- Message from Tomasa sent at 2/13/2025  3:46 PM CST -----  Regarding: Re: Urgent call  Contact: [phone]  Good afternoon,    Pt called requesting a call back from clinical staff.  She has concerns about her blood pressure affecting her present condition.    Thank you,   Bharati Segovia Navigator

## 2025-02-13 NOTE — TELEPHONE ENCOUNTER
"She says she is really sick.  She says she is dizzy, weak, SOB  I advise that if she is SOB, she needs ED. She stated that she was not going to ER. She said "thanks" and disconnected the call  "

## 2025-02-13 NOTE — PROGRESS NOTES
Bell, your white count is high which is concerning for infection. Please get the CT ASAP. If the pain is severe, please go to the ER instead.      Please contact me if you have any additional concerns.    Sincerely,    Nima Johnson

## 2025-02-13 NOTE — PROGRESS NOTES
Patient examined, record reviewed, agree with findings and recommendations as documented above.   depression/anxiety

## 2025-02-13 NOTE — TELEPHONE ENCOUNTER
----- Message from Nima Johnson sent at 2/13/2025  8:27 AM CST -----  Regarding: Expedited CT scan  Hi, I ordered an expedited CT scan for her yesterday in clinic. Can we have it moved up to today, if possible?    Thanks

## 2025-02-14 VITALS
HEIGHT: 66 IN | OXYGEN SATURATION: 96 % | WEIGHT: 189 LBS | RESPIRATION RATE: 16 BRPM | DIASTOLIC BLOOD PRESSURE: 65 MMHG | SYSTOLIC BLOOD PRESSURE: 143 MMHG | BODY MASS INDEX: 30.37 KG/M2 | HEART RATE: 66 BPM | TEMPERATURE: 98 F

## 2025-02-14 LAB
ANTI SM ANTIBODY: 0.09 RATIO (ref 0–0.99)
ANTI SM/RNP ANTIBODY: 0.09 RATIO (ref 0–0.99)
ANTI-SM INTERPRETATION: NEGATIVE
ANTI-SM/RNP INTERPRETATION: NEGATIVE
ANTI-SSA ANTIBODY: 0.07 RATIO (ref 0–0.99)
ANTI-SSA INTERPRETATION: NEGATIVE
ANTI-SSB ANTIBODY: 0.07 RATIO (ref 0–0.99)
ANTI-SSB INTERPRETATION: NEGATIVE
DSDNA AB SER-ACNC: NORMAL [IU]/ML
OHS QRS DURATION: 82 MS
OHS QTC CALCULATION: 432 MS

## 2025-02-14 NOTE — ED NOTES
Assumed care of patient at this time. Pt arrived to ED with a complaint of nausea,denies any abdominal pain at this time.Pt placed in hospital gown and currently lying in stretcher. Vital signs are stable, provided pt with warm blanket. Pt denied restroom use. No other complaints from pt at this time.     Review of patient's allergies indicates:   Allergen Reactions    Lortab [hydrocodone-acetaminophen] Itching    Promethazine Itching and Other (See Comments)    Albuterol      Other Reaction(s): CONFUSION     Past Medical History:   Diagnosis Date    Alcoholism     c/b alcohol withdrawl seizures 7/2017    Anemia     Aortic atherosclerosis 04/17/2024    C. difficile colitis     Cancer of breast 10/2020    s/p bilateral mastectomy for  T1b N0 stage IA breast cancer October 2020    CLL (chronic lymphocytic leukemia) 09/30/2022 6/22/22 - PB flow cytometry  Immunophenotyping of peripheral blood detects a distinct kappa light chain restricted monoclonal B-cell population  (calculated at 2.44x10 9 /L, from the most recent CBC showing a total WBC of  7.35 K/uL with 61% total lymphocytes)  with a CLL phenotype (coexpression of CD19, CD5, CD23 and dim CD20). CD22 (FITC), FMC-7 and CD38 are negative in this population.    Controlled type 2 diabetes mellitus without complication, without long-term current use of insulin 11/30/2021    COPD (chronic obstructive pulmonary disease)     Depression     Diverticulitis     Fatty liver     GERD (gastroesophageal reflux disease)     Hyperlipidemia     Hypertension     Pancreatitis     Peptic ulcer disease     Polysubstance abuse     Posterior reversible encephalopathy syndrome     Sarcoidosis of lung     over 30 yrs ago    Seizures     last seizure 2 years ago    Suicide attempt

## 2025-02-14 NOTE — ED PROVIDER NOTES
Encounter Date: 2/13/2025       History     Chief Complaint   Patient presents with    Nausea     Nausea, faint, weak, dizzy, short of breath onset 2 hours PTA.     64-year-old female with past medical history of COPD on home oxygen, anemia, HLD, HTN, hypothyroidism, CLL, vertigo, alcohol related pancreatitis, C diff infection, diverticulitis and DM2 who is presents to the ED regarding shortness of breath and dizziness.  Patient has been having nausea and vomiting for the past week.  No diarrhea though has not been able to keep anything down, was having diarrhea about 3 to 4 times a day previous to this and has been following with GI.  She states this morning she woke up feeling lightheaded and short of breath.  Also stating having right-sided chest pain that she describes as spasms.  Denies any syncope.  Yesterday she was seen by GI and had labs in his CT abdomen/pelvis ordered.  CBC did show leukocytosis of 16 and she was urged to get scanned urgently.  Also complains of headache with no visual changes.  Denies abdominal pain, fevers, cold like symptoms, constipation, or other complaints at this time.    The history is provided by the patient and medical records.     Review of patient's allergies indicates:   Allergen Reactions    Lortab [hydrocodone-acetaminophen] Itching    Promethazine Itching and Other (See Comments)    Albuterol      Other Reaction(s): CONFUSION     Past Medical History:   Diagnosis Date    Alcoholism     c/b alcohol withdrawl seizures 7/2017    Anemia     Aortic atherosclerosis 04/17/2024    C. difficile colitis     Cancer of breast 10/2020    s/p bilateral mastectomy for  T1b N0 stage IA breast cancer October 2020    CLL (chronic lymphocytic leukemia) 09/30/2022 6/22/22 - PB flow cytometry  Immunophenotyping of peripheral blood detects a distinct kappa light chain restricted monoclonal B-cell population  (calculated at 2.44x10 9 /L, from the most recent CBC showing a total WBC of  7.35  K/uL with 61% total lymphocytes)  with a CLL phenotype (coexpression of CD19, CD5, CD23 and dim CD20). CD22 (FITC), FMC-7 and CD38 are negative in this population.    Controlled type 2 diabetes mellitus without complication, without long-term current use of insulin 11/30/2021    COPD (chronic obstructive pulmonary disease)     Depression     Diverticulitis     Fatty liver     GERD (gastroesophageal reflux disease)     Hyperlipidemia     Hypertension     Pancreatitis     Peptic ulcer disease     Polysubstance abuse     Posterior reversible encephalopathy syndrome     Sarcoidosis of lung     over 30 yrs ago    Seizures     last seizure 2 years ago    Suicide attempt      Past Surgical History:   Procedure Laterality Date    APPENDECTOMY      BILATERAL MASTECTOMY Bilateral 10/29/2020    Procedure: MASTECTOMY, BILATERAL;  Surgeon: Baylee Kevin MD;  Location: [facility] OR [address] [place] [address];  Service: General;  Laterality: Bilateral;    BREAST REVISION SURGERY Bilateral 2/11/2021    Procedure: BREAST REVISION SURGERY;  Surgeon: Scottie Johnson MD;  Location: [facility] OR [address] [place] [address];  Service: Plastics;  Laterality: Bilateral;    COLONOSCOPY N/A 7/28/2017    Procedure: COLONOSCOPY;  Surgeon: Aaron Alvarado MD;  Location: [facility];  Service: Endoscopy;  Laterality: N/A;    COLONOSCOPY, SCREENING, HIGH RISK PATIENT N/A 1/9/2025    Procedure: COLONOSCOPY, SCREENING, HIGH RISK PATIENT;  Surgeon: Aayush Valente MD;  Location: [facility] ([address] [place] [address]);  Service: Endoscopy;  Laterality: N/A;    ESOPHAGOGASTRODUODENOSCOPY  10/7/2016, 11/6/2014    2016 - gastritis, duodenitis, 2014 erosive gastritis    ESOPHAGOGASTRODUODENOSCOPY N/A 2/11/2020    Procedure: ESOPHAGOGASTRODUODENOSCOPY (EGD);  Surgeon: Fawn Garrido MD;  Location: [facility];  Service: Endoscopy;  Laterality: N/A;    ESOPHAGOGASTRODUODENOSCOPY N/A 4/19/2021    Procedure: EGD (ESOPHAGOGASTRODUODENOSCOPY);  Surgeon: Paramjit Martino MD;  Location: [facility];  Service:  Endoscopy;  Laterality: N/A;    ESOPHAGOGASTRODUODENOSCOPY N/A 1/9/2025    Procedure: EGD (ESOPHAGOGASTRODUODENOSCOPY);  Surgeon: Aayush Valente MD;  Location: [address]);  Service: Endoscopy;  Laterality: N/A;  referral dr santiago/ prep inst given in office/ pt requested sutabs/diabetic/eliquis  11/12 Precall performed. Pt needs rx called and instructions resent to [facility]  11/13/24- Rx and instructions sent as requested. DBM  11/14 pt r/s, Ok to hold Eliqui    FLEXIBLE SIGMOIDOSCOPY  11/06/2014    colitis    HYSTERECTOMY      IMPLANTATION OF PERMANENT SACRAL NERVE STIMULATOR N/A 7/12/2022    Procedure: INSERTION, NEUROSTIMULATOR, PERMANENT, SACRAL;  Surgeon: Juaquin Edwards MD;  Location: [facility] OR [address] [place] [address];  Service: Urology;  Laterality: N/A;  1hr    INJECTION FOR SENTINEL NODE IDENTIFICATION Right 10/29/2020    Procedure: INJECTION, FOR SENTINEL NODE IDENTIFICATION;  Surgeon: Baylee Kevin MD;  Location: [address];  Service: General;  Laterality: Right;    INJECTION OF JOINT Right 10/10/2019    Procedure: Injection, Joint RIGHT ILIOPSOAS BURSA/TENDON INJECTION AND RIGHT GLUTEAL TENDON INJECTION WITH STEROID AND LIDOCAINE;  Surgeon: Guillaume Rico MD;  Location: [facility];  Service: Pain Management;  Laterality: Right;  NEEDS CONSENT    INSERTION OF BREAST TISSUE EXPANDER Bilateral 10/29/2020    Procedure: INSERTION, TISSUE EXPANDER, BREAST;  Surgeon: Scottie Johnson MD;  Location: [address];  Service: Plastics;  Laterality: Bilateral;  Right breast: 1082 g  Left breast: 1076 g    LIPOSUCTION Bilateral 2/11/2021    Procedure: LIPOSUCTION;  Surgeon: Scottie Johnson MD;  Location: [address];  Service: Plastics;  Laterality: Bilateral;    mediastenoscopy      REPLACEMENT OF IMPLANT OF BREAST Bilateral 2/11/2021    Procedure: REPLACEMENT, IMPLANT, BREAST;  Surgeon: Scottie Johnson MD;  Location: [address];  Service: Plastics;  Laterality: Bilateral;     SENTINEL LYMPH NODE BIOPSY Right 10/29/2020    Procedure: BIOPSY, LYMPH NODE, SENTINEL;  Surgeon: Baylee Kevin MD;  Location: [facility] OR [address] [place] [address];  Service: General;  Laterality: Right;    TONSILLECTOMY N/A     TUBAL LIGATION       Family History   Problem Relation Name Age of Onset    Heart attack Father      Diabetes Father      Hypertension Father      Diabetes Mother      Hypertension Mother      Breast cancer Maternal Aunt      Colon cancer Maternal Uncle      Breast cancer Daughter      Ovarian cancer Neg Hx      Cancer Neg Hx       Social History     Tobacco Use    Smoking status: Every Day     Current packs/day: 0.00     Average packs/day: 0.5 packs/day for 30.0 years (15.0 ttl pk-yrs)     Types: Vaping with nicotine, Cigarettes     Start date: 1991     Last attempt to quit: 2021     Years since quittin.0    Smokeless tobacco: Never    Tobacco comments:     Patient is currently smoking 10 cigarettes a day, declines nicotine patches   Substance Use Topics    Alcohol use: Not Currently     Comment: vodka daily (half a regular bottle) for 4 days    Drug use: Not Currently     Types: Marijuana     Comment: gummies     Review of Systems  See HPI  Physical Exam     Initial Vitals [25 1748]   BP Pulse Resp Temp SpO2   133/78 103 (!) 24 98.6 °F (37 °C) (!) 94 %      MAP       --         Physical Exam    Vitals reviewed.  Constitutional: She appears well-developed and well-nourished. She is not diaphoretic. No distress.   HENT:   Head: Normocephalic and atraumatic. Mouth/Throat: Uvula is midline. Mucous membranes are dry.   Eyes: Conjunctivae and EOM are normal. Pupils are equal, round, and reactive to light.   Neck: Neck supple.   Normal range of motion.  Cardiovascular:  Normal rate, regular rhythm and normal heart sounds.           No lower extremity edema   Pulmonary/Chest: No respiratory distress. She has no wheezes.   Abdominal: Abdomen is soft. She exhibits no distension. There is  abdominal tenderness in the right lower quadrant.   Very mild TTP to RLQ There is no rebound and no guarding.   Musculoskeletal:      Cervical back: Normal range of motion and neck supple.     Neurological: She is alert and oriented to person, place, and time. No cranial nerve deficit.   Psychiatric: She has a normal mood and affect.         ED Course   Procedures  Labs Reviewed   CBC W/ AUTO DIFFERENTIAL - Abnormal       Result Value    WBC 11.17      RBC 4.48      Hemoglobin 10.8 (*)     Hematocrit 36.9 (*)     MCV 82      MCH 24.1 (*)     MCHC 29.3 (*)     RDW 15.9 (*)     Platelets 104 (*)     MPV 10.8      Immature Granulocytes CANCELED      Immature Grans (Abs) CANCELED      nRBC 0      Gran % 30.0 (*)     Lymph % 66.0 (*)     Mono % 3.0 (*)     Eosinophil % 0.0      Basophil % 1.0      Platelet Estimate Decreased (*)     Aniso Slight      Poik Slight      Poly Occasional      Hypo Occasional      Ovalocytes Occasional      Target Cells Occasional      Smudge Cells Present      Differential Method Manual      Narrative:     ADD ON LIPASE PER DR CRISTOPHER BARR/ORDER# [id number]   INFLUENZA A & B BY MOLECULAR    Influenza A, Molecular Negative      Influenza B, Molecular Negative      Flu A & B Source Nasal swab     CULTURE, BLOOD   CULTURE, BLOOD   COMPREHENSIVE METABOLIC PANEL    Sodium 139      Potassium 4.4      Chloride 104      CO2 25      Glucose 89      BUN 13      Creatinine 1.0      Calcium 9.4      Total Protein 7.0      Albumin 4.1      Total Bilirubin 0.4      Alkaline Phosphatase 49      AST 22      ALT 16      eGFR >60.0      Anion Gap 10      Narrative:     ADD ON LIPASE PER DR CRISTOPHER BARR/ORDER# [id number]   TROPONIN I HIGH SENSITIVITY    Troponin I High Sensitivity <3      Narrative:     ADD ON LIPASE PER DR CRISTOPHER BARR/ORDER# [id number]   TROPONIN I HIGH SENSITIVITY    Troponin I High Sensitivity <3     B-TYPE NATRIURETIC PEPTIDE    BNP <10      Narrative:     ADD ON LIPASE PER DR NICHOLAS  CORTNEY/ORDER# [id number]   SARS-COV-2 RNA AMPLIFICATION, QUAL    SARS-CoV-2 RNA, Amplification, Qual Negative     LIPASE   LIPASE    Lipase 14      Narrative:     ADD ON LIPASE PER DR CRISTOPHER BARR/ORDER# [id number]     EKG Readings: (Independently Interpreted)   Initial Reading: No STEMI. Rhythm: Normal Sinus Rhythm. Heart Rate: 81. Ectopy: No Ectopy. Conduction: Normal. ST Segments: Normal ST Segments. T Waves: Normal.       Imaging Results              CTA Chest Non-Coronary (PE Studies) (Final result)  Result time 02/13/25 22:59:26      Final result by Liborio Durán MD (02/13/25 22:59:26)                   Impression:      No evidence of pulmonary thromboembolism.    Bulky lymphadenopathy in the chest abdomen pelvis consistent with history of CLL, mildly increased when compared to prior.    Nodular liver contour suggesting cirrhosis.    Additional incidental findings above.    Electronically signed by resident: Justin Luther  Date:    02/13/2025  Time:    21:59    Electronically signed by: Liborio Durán  Date:    02/13/2025  Time:    22:59               Narrative:    EXAMINATION:  CTA CHEST NON CORONARY (PE STUDIES); CT ABDOMEN PELVIS WITH IV CONTRAST    CLINICAL HISTORY:  Pulmonary embolism (PE) suspected, unknown D-dimer;; RLQ abdominal pain (Age >= 14y);    TECHNIQUE:  Low dose axial images, sagittal and coronal reformations were obtained from the thoracic inlet to the pubic symphysis following the IV administration of 100 mL of Omnipaque 350 intravenous contrast. Oral contrast was not administered.    COMPARISON:  CT abdomen pelvis 10/22/2024 CTA chest 09/16/2023    FINDINGS:  CHEST:    Neck and thoracic soft tissues: Numerous enlarged axillary and supraclavicular lymph nodes bilaterally for example right subpectoral node measuring 1.3 cm in short axis (series 2, image 109), previously measuring 1 cm in short axis.  Postoperative change of bilateral mastectomy with breast  implants.    Vasculature: Left sided aortic arch. Thoracic aorta is nonaneurysmal. Mild calcific atherosclerosis of the thoracic aorta . Pulmonary arteries are sufficiently opacified for pulmonary embolus evaluation.  Pulmonary arteries distribute normally.  There is no pulmonary embolus identified.  Pulmonary veins distribute normally.    Heart: Normal size. Mild calcific atherosclerosis of the coronary arteries. No pericardial effusion.    Airways: Central airways are clear.    Lungs/Pleura: Mild bilateral dependent and bandlike atelectasis.  0.5 cm subpleural nodule in the right upper lobe (series 2, image 146).  No focal consolidation. No pleural effusion or pneumothorax.    Hilum/Mediastinum: Mediastinal lymphadenopathy, similar to prior.    Esophagus: No significant abnormality.    ABDOMEN/PELVIS:    Liver: Nodular contour suggesting cirrhosis.  No focal abnormality.    Gallbladder: No stones.  No pericholecystic inflammatory changes.    Bile ducts: No intrahepatic or extrahepatic biliary ductal dilatation.    Spleen: Splenomegaly measuring 16.0 cm AP.  Subcentimeter hypodense lesion within the spleen, not significantly changed from prior.  Splenules, unchanged..    Pancreas: No mass. No peripancreatic fat stranding.    Adrenals: No significant abnormality    Renal/Ureters: The kidneys are normal in size . No stones.  1 cm hypodense left renal lesion with attenuation higher than expected for simple cyst, possibly complex cyst, unchanged.  No hydroureteronephrosis. The urinary bladder is unremarkable.    Reproductive: Hysterectomy.    Stomach/Bowel: Stomach is unremarkable.  1 cm fat density in the duodenum possibly lipoma, unchanged.  Small bowel is without evidence of obstruction or inflammation.  Appendix is unremarkable.  1.1 cm fat density within the colon at the splenic flexure, possibly lipoma, not seen on prior.  No evidence of large bowel obstruction or inflammation.  There is sigmoid  diverticulosis.    Peritoneum: No free fluid. No intraperitoneal free air.    Lymph Nodes: There is bulky lymphadenopathy within the abdomen and pelvis with index nodes as follows: Left iliac chain node measuring 3.0 cm in short axis (series 3, image 145), previously measuring 2.6 cm.  Right iliac chain node measuring 2.2 cm in short axis, previously 1.9 cm (series 3, image 152).    Vasculature: Abdominal aorta tapers normally.  Severe calcific atherosclerosis of the abdominal aorta and its branches.  Portal vasculature, SMV, and splenic vein are patent.    Bones: Degenerative changes of the spine.  No acute fractures or osseous destructive lesions.    Soft Tissues: Sacral stimulator in the right gluteal soft tissues.                                       CT Abdomen Pelvis With IV Contrast NO Oral Contrast (Final result)  Result time 02/13/25 22:59:26      Final result by Liborio Durán MD (02/13/25 22:59:26)                   Impression:      No evidence of pulmonary thromboembolism.    Bulky lymphadenopathy in the chest abdomen pelvis consistent with history of CLL, mildly increased when compared to prior.    Nodular liver contour suggesting cirrhosis.    Additional incidental findings above.    Electronically signed by resident: Justin Luther  Date:    02/13/2025  Time:    21:59    Electronically signed by: Liborio Durán  Date:    02/13/2025  Time:    22:59               Narrative:    EXAMINATION:  CTA CHEST NON CORONARY (PE STUDIES); CT ABDOMEN PELVIS WITH IV CONTRAST    CLINICAL HISTORY:  Pulmonary embolism (PE) suspected, unknown D-dimer;; RLQ abdominal pain (Age >= 14y);    TECHNIQUE:  Low dose axial images, sagittal and coronal reformations were obtained from the thoracic inlet to the pubic symphysis following the IV administration of 100 mL of Omnipaque 350 intravenous contrast. Oral contrast was not administered.    COMPARISON:  CT abdomen pelvis 10/22/2024 CTA chest  09/16/2023    FINDINGS:  CHEST:    Neck and thoracic soft tissues: Numerous enlarged axillary and supraclavicular lymph nodes bilaterally for example right subpectoral node measuring 1.3 cm in short axis (series 2, image 109), previously measuring 1 cm in short axis.  Postoperative change of bilateral mastectomy with breast implants.    Vasculature: Left sided aortic arch. Thoracic aorta is nonaneurysmal. Mild calcific atherosclerosis of the thoracic aorta . Pulmonary arteries are sufficiently opacified for pulmonary embolus evaluation.  Pulmonary arteries distribute normally.  There is no pulmonary embolus identified.  Pulmonary veins distribute normally.    Heart: Normal size. Mild calcific atherosclerosis of the coronary arteries. No pericardial effusion.    Airways: Central airways are clear.    Lungs/Pleura: Mild bilateral dependent and bandlike atelectasis.  0.5 cm subpleural nodule in the right upper lobe (series 2, image 146).  No focal consolidation. No pleural effusion or pneumothorax.    Hilum/Mediastinum: Mediastinal lymphadenopathy, similar to prior.    Esophagus: No significant abnormality.    ABDOMEN/PELVIS:    Liver: Nodular contour suggesting cirrhosis.  No focal abnormality.    Gallbladder: No stones.  No pericholecystic inflammatory changes.    Bile ducts: No intrahepatic or extrahepatic biliary ductal dilatation.    Spleen: Splenomegaly measuring 16.0 cm AP.  Subcentimeter hypodense lesion within the spleen, not significantly changed from prior.  Splenules, unchanged..    Pancreas: No mass. No peripancreatic fat stranding.    Adrenals: No significant abnormality    Renal/Ureters: The kidneys are normal in size . No stones.  1 cm hypodense left renal lesion with attenuation higher than expected for simple cyst, possibly complex cyst, unchanged.  No hydroureteronephrosis. The urinary bladder is unremarkable.    Reproductive: Hysterectomy.    Stomach/Bowel: Stomach is unremarkable.  1 cm fat density  in the duodenum possibly lipoma, unchanged.  Small bowel is without evidence of obstruction or inflammation.  Appendix is unremarkable.  1.1 cm fat density within the colon at the splenic flexure, possibly lipoma, not seen on prior.  No evidence of large bowel obstruction or inflammation.  There is sigmoid diverticulosis.    Peritoneum: No free fluid. No intraperitoneal free air.    Lymph Nodes: There is bulky lymphadenopathy within the abdomen and pelvis with index nodes as follows: Left iliac chain node measuring 3.0 cm in short axis (series 3, image 145), previously measuring 2.6 cm.  Right iliac chain node measuring 2.2 cm in short axis, previously 1.9 cm (series 3, image 152).    Vasculature: Abdominal aorta tapers normally.  Severe calcific atherosclerosis of the abdominal aorta and its branches.  Portal vasculature, SMV, and splenic vein are patent.    Bones: Degenerative changes of the spine.  No acute fractures or osseous destructive lesions.    Soft Tissues: Sacral stimulator in the right gluteal soft tissues.                                       X-Ray Chest AP Portable (Final result)  Result time 02/13/25 20:19:49      Final result by Vicente Mahmood MD (02/13/25 20:19:49)                   Impression:      Pre-existing surgical changes in left hemithorax.    Pre-existing mild enlargement of the cardiopericardial silhouette and findings suspicious for underlying pulmonary arterial hypertension.    Recommendations include clinical correlation and continued imaging follow-up.      Electronically signed by: Vicente Mahmood  Date:    02/13/2025  Time:    20:19               Narrative:    EXAMINATION:  XR CHEST AP PORTABLE    CLINICAL HISTORY:  shortness of breath;    TECHNIQUE:  Single frontal view of the chest was performed.    COMPARISON:  PA and lateral chest x-rays 12/26/2024    FINDINGS:  Midline thoracic trachea, allowing for rightward patient rotation.    Pre-existing mild enlargement of the  cardiopericardial silhouette.    Pre-existing enlargement of the main pulmonary artery segment and right hilum, suspicious for underlying pulmonary arterial hypertension.    Pre-existing surgical clips projecting over the left hemithorax, with hyper lucency of the left upper hemithorax possibly related to prior resection of a portion of the left lung.    No consolidation, discrete pneumothorax, or blunting of the lateral costophrenic angles.  Note that an area of poorly defined relatively increased opacity projecting over the left lower hemithorax is likely related to the effects of patient rotation and summation shadows involving the overlying left breast tissue.                                       Medications   ondansetron injection 4 mg (4 mg Intravenous Given 2/13/25 1853)   sodium chloride 0.9% bolus 1,000 mL 1,000 mL (0 mLs Intravenous Stopped 2/13/25 2058)   iohexoL (OMNIPAQUE 350) injection 100 mL (100 mLs Intravenous Given 2/13/25 2103)     Medical Decision Making  Emergent evaluation of 64-year-old female regarding shortness of breath and dizziness.  Mildly tachycardic, tachypneic, and hypoxic.  Clinically well-appearing, in no acute distress.  See physical exam findings above. Will obtain labs, CT imaging to assess for acute abnormalities.    My differential diagnoses include but are not limited to:  Appendicitis, diverticulitis, IBS, acute pancreatitis, ACS, PE, COPD exacerbation, hypoglycemia, electrolyte abnormality, DEVANTE, dehydration, UTI, pneumonia, lower suspicion for sepsis though given tachycardia and tachypnea on arrival will obtain blood cx/lactate  See ED course.    Amount and/or Complexity of Data Reviewed  Labs: ordered. Decision-making details documented in ED Course.  Radiology: ordered.    Risk  Prescription drug management.               ED Course as of 02/14/25 0002   Thu Feb 13, 2025   1843 Dropped to 89% on RA with good wave form, will put on 2L O2 [KB]   1926 WBC: 11.17  Improved.   Previously 16 yesterday [KB]   1926 Platelet Count(!): 104 [KB]   1936 Comprehensive metabolic panel  WNL [KB]   1936 BNP: <10 [KB]   1936 Troponin I High Sensitivity: <3 [KB]   1936 Lipase: 14 [KB]   2111 POC Lactate: 0.5 [KB]   2113 Pending CT imaging signed out to oncoming ED staff due to shift change. [KB]      ED Course User Index  [KB] Tamiko Perales PA-C                             Clinical Impression:  Final diagnoses:  [R06.02] Shortness of breath  [R07.9] Chest pain  [J44.1] COPD exacerbation (Primary)  [C91.10] CLL (chronic lymphocytic leukemia)  [R11.0] Nausea  [R42] Vertigo          ED Disposition Condition    Discharge Stable          ED Prescriptions       Medication Sig Dispense Start Date End Date Auth. Provider    prochlorperazine (COMPAZINE) 5 MG tablet Take 1 tablet (5 mg total) by mouth 4 (four) times daily as needed. 24 tablet 2/13/2025 -- David Berumen MD    predniSONE (DELTASONE) 20 MG tablet Take 2 tablets (40 mg total) by mouth once daily. for 4 days 8 tablet 2/13/2025 2/17/2025 David Berumen MD          Follow-up Information       Follow up With Specialties Details Why Contact Info    Andrew Rodriguez MD Internal Medicine Call in 1 day To set up a follow-up appointment, To recheck today's symptoms [address] [facility] [address]  [facility] [address]  [phone]      [facility] CARDIOLOGY Cardiology Call  To set up a follow-up appointment to discuss repeat echo [address] [facility] [address]  [phone]             Tamiko Perales PA-C  02/14/25 0004

## 2025-02-14 NOTE — ED TRIAGE NOTES
Pt arrives to ED for nausea x1 week and SOB that began today. Pt states she has COPD and wears oxygen at home PRN. Pt had an episode of vomiting yesterday and reports not being able to eat x2 days. Denies fevers, chills, body aches, diarrhea

## 2025-02-14 NOTE — DISCHARGE INSTRUCTIONS
Thank you for coming to our Emergency Department today. It is important to remember that some problems or medical conditions are difficult to diagnose and may not be found during your Emergency Department visit.     Be sure to follow up with your primary care doctor and review all labs/imaging/tests that were performed during your ER visit with them. Some labs/tests may be outside of the normal range and require non-emergent follow-up and further investigation to help diagnose/exclude/prevent complications or other potentially serious medical conditions that were not addressed during your ER visit.    If you do not have a primary care doctor, you may contact the one listed on your discharge paperwork or you may also call the [facility] Appointment Desk at [phone] to schedule an appointment and establish care with one. Another resources for finding primary care physicians: [email or web address] It is important to your health that you have a primary care doctor.    Please take all medications as directed. All medications may potentially have side-effects and it is impossible to predict which medications may give you side-effects or what side-effects (if any) they will give you. If you feel that you are having a negative effect or side-effect of any medication you should immediately stop taking them and seek medical attention. If you feel that you are having a life-threatening reaction call 911.    Return to the ER with any questions/concerns, new/concerning symptoms, worsening or failure to improve.     Do not drive, swim, climb to height, take a bath, operate heavy machinery, drink alcohol or take potentially sedating medications, sign any legal documents or make any important decisions for 24 hours if you have received any pain medications, sedatives or mood altering drugs during your ER visit or within 24 hours of taking them if they have been prescribed to you.     You can find additional resources for  Dentists, hearing aids, durable medical equipment, low cost pharmacies and other resources at [email or web address]            Prior echo of your heart showed no evidence of pulmonary hypertension:    Left Ventricle: The left ventricle is normal in size. Normal wall   thickness. There is concentric remodeling. There is normal systolic   function with a visually estimated ejection fraction of 55 - 60%. There is   normal diastolic function.     Right Ventricle: Normal right ventricular cavity size. Wall thickness   is normal. Right ventricle wall motion  is normal. Systolic function is   normal.     Left Atrium: Left atrium is mildly dilated.     Right Atrium: Right atrium is mildly dilated.     IVC/SVC: Normal venous pressure at 3 mmHg.

## 2025-02-14 NOTE — TELEPHONE ENCOUNTER
Nima Johnson MD Totaro, Raeann, MA  Caller: Unspecified (Yesterday,  4:28 PM)  She absolutely needs to go to the ER. Her lightheadedness and low BP is not something I can address. I will give her a call.

## 2025-02-15 LAB
CLINICAL BIOCHEMIST REVIEW: NORMAL
PLPETH BLD-MCNC: <10 NG/ML
POPETH BLD-MCNC: <10 NG/ML

## 2025-02-16 ENCOUNTER — PATIENT MESSAGE (OUTPATIENT)
Dept: GASTROENTEROLOGY | Facility: CLINIC | Age: 65
End: 2025-02-16
Payer: COMMERCIAL

## 2025-02-17 LAB
A1AT PHENOTYP SERPL-IMP: NORMAL BANDS
A1AT SERPL NEPH-MCNC: 172 MG/DL (ref 100–190)

## 2025-02-18 LAB
BACTERIA BLD CULT: NORMAL
BACTERIA BLD CULT: NORMAL

## 2025-02-18 RX ORDER — ONDANSETRON HYDROCHLORIDE 8 MG/1
8 TABLET, FILM COATED ORAL EVERY 8 HOURS PRN
Qty: 30 TABLET | Refills: 0 | Status: SHIPPED | OUTPATIENT
Start: 2025-02-18

## 2025-02-19 ENCOUNTER — TELEPHONE (OUTPATIENT)
Dept: GASTROENTEROLOGY | Facility: CLINIC | Age: 65
End: 2025-02-19
Payer: COMMERCIAL

## 2025-02-19 DIAGNOSIS — C91.10 CLL (CHRONIC LYMPHOCYTIC LEUKEMIA): Primary | ICD-10-CM

## 2025-02-19 NOTE — TELEPHONE ENCOUNTER
----- Message from Luis Ely MD sent at 2/17/2025  7:50 AM CST -----  Regarding: RE: Clinic follow up  Looks like the adenopathy was there before and not changed much - since Dr Montano has left - it would be reasonable to refer back so she can establish care with someone else  ----- Message -----  From: Nima Johnson MD  Sent: 2/14/2025   7:11 AM CST  To: Luis Ely MD  Subject: Clinic follow up                                 Hi Dr. Ely, for some reason I am unable to message Dr. Miguel Montano who saw her most recently.     This is a patient of mine who I am following for GI complaints and newly diagnosed cirrhosis. On her most recent CT scans, we are seeing increased LNpathy. Given her hx of CLL, I am concerned. Does she need to follow up in Heme-Onc clinic?    Thanks

## 2025-02-24 ENCOUNTER — OFFICE VISIT (OUTPATIENT)
Dept: CARDIOLOGY | Facility: CLINIC | Age: 65
End: 2025-02-24
Payer: COMMERCIAL

## 2025-02-24 ENCOUNTER — TELEPHONE (OUTPATIENT)
Dept: HEMATOLOGY/ONCOLOGY | Facility: CLINIC | Age: 65
End: 2025-02-24
Payer: COMMERCIAL

## 2025-02-24 VITALS
HEIGHT: 66 IN | SYSTOLIC BLOOD PRESSURE: 109 MMHG | HEART RATE: 77 BPM | DIASTOLIC BLOOD PRESSURE: 56 MMHG | BODY MASS INDEX: 32.59 KG/M2 | WEIGHT: 202.81 LBS

## 2025-02-24 DIAGNOSIS — R06.02 SHORTNESS OF BREATH: Primary | ICD-10-CM

## 2025-02-24 DIAGNOSIS — I48.91 ATRIAL FIBRILLATION, UNSPECIFIED TYPE: ICD-10-CM

## 2025-02-24 PROCEDURE — 1126F AMNT PAIN NOTED NONE PRSNT: CPT | Mod: CPTII,S$GLB,, | Performed by: STUDENT IN AN ORGANIZED HEALTH CARE EDUCATION/TRAINING PROGRAM

## 2025-02-24 PROCEDURE — 3008F BODY MASS INDEX DOCD: CPT | Mod: CPTII,S$GLB,, | Performed by: STUDENT IN AN ORGANIZED HEALTH CARE EDUCATION/TRAINING PROGRAM

## 2025-02-24 PROCEDURE — 1101F PT FALLS ASSESS-DOCD LE1/YR: CPT | Mod: CPTII,S$GLB,, | Performed by: STUDENT IN AN ORGANIZED HEALTH CARE EDUCATION/TRAINING PROGRAM

## 2025-02-24 PROCEDURE — 3078F DIAST BP <80 MM HG: CPT | Mod: CPTII,S$GLB,, | Performed by: STUDENT IN AN ORGANIZED HEALTH CARE EDUCATION/TRAINING PROGRAM

## 2025-02-24 PROCEDURE — 99999 PR PBB SHADOW E&M-EST. PATIENT-LVL V: CPT | Mod: PBBFAC,,, | Performed by: STUDENT IN AN ORGANIZED HEALTH CARE EDUCATION/TRAINING PROGRAM

## 2025-02-24 PROCEDURE — 3074F SYST BP LT 130 MM HG: CPT | Mod: CPTII,S$GLB,, | Performed by: STUDENT IN AN ORGANIZED HEALTH CARE EDUCATION/TRAINING PROGRAM

## 2025-02-24 PROCEDURE — 1159F MED LIST DOCD IN RCRD: CPT | Mod: CPTII,S$GLB,, | Performed by: STUDENT IN AN ORGANIZED HEALTH CARE EDUCATION/TRAINING PROGRAM

## 2025-02-24 PROCEDURE — 3288F FALL RISK ASSESSMENT DOCD: CPT | Mod: CPTII,S$GLB,, | Performed by: STUDENT IN AN ORGANIZED HEALTH CARE EDUCATION/TRAINING PROGRAM

## 2025-02-24 PROCEDURE — 99204 OFFICE O/P NEW MOD 45 MIN: CPT | Mod: S$GLB,,, | Performed by: STUDENT IN AN ORGANIZED HEALTH CARE EDUCATION/TRAINING PROGRAM

## 2025-02-24 NOTE — PROGRESS NOTES
PCP - Andrew Rodriguez MD  Referring Physician:     Subjective:   Patient ID:  Earl Abdul is a 64 y.o. female with past medical history of:   COPD on home oxygen  Anemia  Cirrhosis  pAF on Eliquis    Recent ER visit for shortness of breath.  BNP and troponin negative.  Patient reports that she has been short of breath for years but that it has gotten worse in the past 6 months.  She is not having any chest pain.  She had a recent ER visit and at the visit a chest x-ray showed evidence of pulmonary arterial hypertension for which she was referred.  No history of elevated pulmonary artery pressures on echocardiogram.  She is smoking half a pack of cigarettes per day.  She has no intention of quitting at this time.  She is intermittently on 3 L of oxygen at home.  She also reports being very fatigued.  She has been referred for sleep study in the past but CPAP was not prescribed at that time.    History:     Social History     Tobacco Use    Smoking status: Every Day     Current packs/day: 0.00     Average packs/day: 0.5 packs/day for 30.0 years (15.0 ttl pk-yrs)     Types: Vaping with nicotine, Cigarettes     Start date: 1991     Last attempt to quit: 2021     Years since quittin.0    Smokeless tobacco: Never    Tobacco comments:     Patient is currently smoking 10 cigarettes a day, declines nicotine patches   Substance Use Topics    Alcohol use: Not Currently     Comment: vodka daily (half a regular bottle) for 4 days     Family History   Problem Relation Name Age of Onset    Heart attack Father      Diabetes Father      Hypertension Father      Diabetes Mother      Hypertension Mother      Breast cancer Maternal Aunt      Colon cancer Maternal Uncle      Breast cancer Daughter      Ovarian cancer Neg Hx      Cancer Neg Hx         Meds:     Review of patient's allergies indicates:   Allergen Reactions    Lortab [hydrocodone-acetaminophen] Itching    Promethazine Itching and Other (See  Comments)    Albuterol      Other Reaction(s): CONFUSION     Current Medications[1]      Objective:   There were no vitals taken for this visit.    Physical Exam  Gen: No apparent distress, resting comfortably  HEENT: Pupils equal and reactive to light  Cardio: Regular rate, point of maximal impulse not displaced, no murmur noted, 2+ radial pulses bilaterally, 2+ DP pulses bilaterally  Resp: CTAB, no wheezing  Abd: Soft, non-tender, non-distended  Skin: Warm, dry, no peripheral edema noted  Neuro: Alert and oriented x3  Psych: Normal mood and affect      Labs:     Lab Results   Component Value Date     02/13/2025    K 4.4 02/13/2025     02/13/2025    CO2 25 02/13/2025    BUN 13 02/13/2025    CREATININE 1.0 02/13/2025    GLUCOSE 115 (H) 09/19/2023    ANIONGAP 10 02/13/2025     Lab Results   Component Value Date    HGBA1C 5.9 (H) 06/09/2024     Lab Results   Component Value Date    BNP <10 02/13/2025    BNP 23 10/22/2024    BNP 47 08/05/2024       Lab Results   Component Value Date    WBC 11.17 02/13/2025    HGB 10.8 (L) 02/13/2025    HCT 36.9 (L) 02/13/2025    HCT 38 10/22/2024     (L) 02/13/2025    GRAN 30.0 (L) 02/13/2025    GRAN 4.4 12/18/2024     Lab Results   Component Value Date    CHOL 184 04/06/2023    HDL 44 04/06/2023    LDLCALC 107.8 04/06/2023    TRIG 161 (H) 04/06/2023       Lab Results   Component Value Date     02/13/2025    K 4.4 02/13/2025     02/13/2025    CO2 25 02/13/2025    BUN 13 02/13/2025    CREATININE 1.0 02/13/2025    GLUCOSE 115 (H) 09/19/2023    ANIONGAP 10 02/13/2025     Lab Results   Component Value Date    HGBA1C 5.9 (H) 06/09/2024     Lab Results   Component Value Date    BNP <10 02/13/2025    BNP 23 10/22/2024    BNP 47 08/05/2024    Lab Results   Component Value Date    WBC 11.17 02/13/2025    HGB 10.8 (L) 02/13/2025    HCT 36.9 (L) 02/13/2025    HCT 38 10/22/2024     (L) 02/13/2025    GRAN 30.0 (L) 02/13/2025    GRAN 4.4 12/18/2024     Lab  Results   Component Value Date    CHOL 184 04/06/2023    HDL 44 04/06/2023    LDLCALC 107.8 04/06/2023    TRIG 161 (H) 04/06/2023                Cardiovascular Imaging:     Echo 3/4/24:    Left Ventricle: The left ventricle is normal in size. Normal wall thickness. There is concentric remodeling. There is normal systolic function with a visually estimated ejection fraction of 55 - 60%. There is normal diastolic function.    Right Ventricle: Normal right ventricular cavity size. Wall thickness is normal. Right ventricle wall motion  is normal. Systolic function is normal.    Left Atrium: Left atrium is mildly dilated.    Right Atrium: Right atrium is mildly dilated.    IVC/SVC: Normal venous pressure at 3 mmHg.       Stress test:     C:    Assessment & Plan:       Chronic dyspnea on exertion  Etiology of the symptoms is likely multifactorial in the setting of COPD, anemia, sleep apnea, etc.  We will get echocardiogram to evaluate PA pressures given findings on chest x-ray  We will refer her to Sleep Medicine   She will need to follow up with pulmonology for her COPD    2. pAF  Diagnosed approximately 1 year ago   Latest EKG was sinus rhythm   Continue Eliquis 5 mg b.i.d.    Return to clinic in 6 months or sooner if needed    Signed:  Guilherme Saha MD  [facility]                [1]   Current Outpatient Medications:     amitriptyline (ELAVIL) 25 MG tablet, Take 25-50 mg by mouth every evening. (Patient not taking: Reported on 2/12/2025), Disp: , Rfl:     atogepant (QULIPTA) 60 mg Tab, Take 60 mg by mouth once daily. (Patient not taking: Reported on 2/12/2025), Disp: , Rfl:     cholestyramine (QUESTRAN) 4 gram packet, Take 1 packet (4 g total) by mouth every evening. Avoid taking other medications one hour before or 4 hours after taking this medication. Can interfere with absorption of other medications. (Patient not taking: Reported on 2/12/2025), Disp: 30 packet, Rfl: 0    clonazePAM (KLONOPIN) 0.5 MG  tablet, Take 0.25 mg by mouth every evening., Disp: , Rfl:     cyanocobalamin (VITAMIN B-12) 1000 MCG tablet, Take 100 mcg by mouth once daily. Once every morning. (Patient not taking: Reported on 2/12/2025), Disp: , Rfl:     diclofenac sodium (VOLTAREN) 1 % Gel, Apply 2 g topically 4 (four) times daily. (Patient not taking: Reported on 2/12/2025), Disp: 100 g, Rfl: 11    dicyclomine (BENTYL) 10 MG capsule, Take 1 capsule (10 mg total) by mouth 3 (three) times daily as needed (abdominal cramping)., Disp: 90 capsule, Rfl: 0    donepeziL (ARICEPT) 5 MG tablet, Take 1 tablet by mouth every evening., Disp: , Rfl:     EScitalopram oxalate (LEXAPRO) 10 MG tablet, Take 10 mg by mouth once daily. (Patient not taking: Reported on 2/12/2025), Disp: , Rfl:     fluticasone furoate (ARNUITY ELLIPTA) 100 mcg/actuation inhaler, Inhale 1 puff into the lungs once daily. Rinse mouth after each use., Disp: 30 each, Rfl: 11    folic acid (FOLVITE) 1 MG tablet, Take 1 tablet (1 mg total) by mouth once daily. (Patient not taking: Reported on 2/12/2025), Disp: 30 tablet, Rfl: 0    gabapentin (NEURONTIN) 300 MG capsule, Take 1 capsule (300 mg total) by mouth 3 (three) times daily., Disp: 90 capsule, Rfl: 0    hydrOXYzine pamoate (VISTARIL) 50 MG Cap, Take 50 mg by mouth daily as needed., Disp: , Rfl:     lancets Misc, Use to check blood glucose 4 times daily (Patient not taking: Reported on 2/12/2025), Disp: 100 each, Rfl: 5    levothyroxine (SYNTHROID) 75 MCG tablet, Take 1 tablet (75 mcg total) by mouth before breakfast., Disp: 90 tablet, Rfl: 1    magnesium oxide (MAG-OX) 400 mg (241.3 mg magnesium) tablet, TAKE 1 TABLET BY MOUTH EVERY MORNING, Disp: 90 tablet, Rfl: 0    metFORMIN (GLUCOPHAGE-XR) 500 MG ER 24hr tablet, Take 1 tablet (500 mg total) by mouth once daily., Disp: 360 tablet, Rfl: 3    modafiniL (PROVIGIL) 100 MG Tab, Take 100 mg by mouth every morning., Disp: , Rfl:     multivitamin Tab, Take 1 tablet by mouth once daily.  (Patient not taking: Reported on 2/12/2025), Disp: 30 tablet, Rfl: 0    naltrexone (DEPADE) 50 mg tablet, Take 50 mg by mouth once daily., Disp: , Rfl:     naproxen (NAPROSYN) 500 MG tablet, Take 1 tablet (500 mg total) by mouth 2 (two) times a day. (Patient not taking: Reported on 2/12/2025), Disp: 30 tablet, Rfl: 0    omega-3 fatty acids/fish oil (FISH OIL-OMEGA-3 FATTY ACIDS) 300-1,000 mg capsule, Take 2 capsules by mouth once daily. Take mornings and nights. (Patient not taking: Reported on 2/12/2025), Disp: , Rfl:     omeprazole (PRILOSEC) 20 MG capsule, Take 1 capsule (20 mg total) by mouth once daily., Disp: 30 capsule, Rfl: 0    ondansetron (ZOFRAN) 8 MG tablet, Take 1 tablet (8 mg total) by mouth every 8 (eight) hours as needed for Nausea., Disp: 30 tablet, Rfl: 0    prochlorperazine (COMPAZINE) 5 MG tablet, Take 1 tablet (5 mg total) by mouth 4 (four) times daily as needed., Disp: 24 tablet, Rfl: 0    propranoloL (INDERAL) 20 MG tablet, Take 20 mg by mouth 2 (two) times daily., Disp: , Rfl:     QUEtiapine (SEROQUEL) 50 MG tablet, Take 50 mg by mouth every evening. (Patient not taking: Reported on 2/12/2025), Disp: , Rfl:     rifAXIMin (XIFAXAN) 550 mg Tab, Take 1 tablet (550 mg total) by mouth 3 (three) times daily. for 14 days, Disp: 42 tablet, Rfl: 0    scopolamine (TRANSDERM-SCOP) 1.3-1.5 mg (1 mg over 3 days), Place 1 patch onto the skin every 72 hours., Disp: 7 patch, Rfl: 0    tiotropium bromide (SPIRIVA RESPIMAT) 2.5 mcg/actuation inhaler, Inhale 2 puffs into the lungs Daily. Controller, Disp: 4 g, Rfl: 11    traZODone (DESYREL) 100 MG tablet, Take 2 tablets (200 mg total) by mouth every evening., Disp: 60 tablet, Rfl: 0    TURMERIC ORAL, Take 250 mg by mouth every morning. TurmericXL (Patient not taking: Reported on 2/12/2025), Disp: , Rfl:     vortioxetine (TRINTELLIX) 5 mg Tab, Take 5 mg by mouth every morning., Disp: , Rfl:   No current facility-administered medications for this  visit.    Facility-Administered Medications Ordered in Other Visits:     albuterol sulfate nebulizer solution 2.5 mg, 2.5 mg, Nebulization, Once, Andrew Rodriguez MD

## 2025-02-24 NOTE — TELEPHONE ENCOUNTER
Called and spoke to Patient .  Appointment scheduled on 3/14 with Ana.  All questions and concerns addressed.   Provided my name and direct number and instructed Patient  to call with any questions and/or concerns.       DAYTON RhodesN, RN-BC  BMT Nurse Navigator  [facility] [address], [place], [place] [address]  _________________________  o [phone]

## 2025-02-24 NOTE — NURSING
Oncology Navigation   Intake  Cancer Type: Lymphoma  Type of Referral: Internal  Date of Referral: 02/21/25  Initial Nurse Navigator Contact: 02/21/25  Referral to Initial Contact Timeline (days): 0  First Appointment Available: 03/13/25  Appointment Date: 03/13/25  First Available Date vs. Scheduled Date (days): 0     Treatment                              Acuity      Follow Up  No follow-ups on file.

## 2025-02-28 RX ORDER — LANOLIN ALCOHOL/MO/W.PET/CERES
1 CREAM (GRAM) TOPICAL EVERY MORNING
Qty: 90 TABLET | Refills: 0 | Status: SHIPPED | OUTPATIENT
Start: 2025-02-28

## 2025-03-03 NOTE — NURSING
Discharged to home via private auto. Accompanied by . Discharge instructions given. safety measures in place.   n/a

## 2025-03-15 ENCOUNTER — PATIENT MESSAGE (OUTPATIENT)
Dept: ADMINISTRATIVE | Facility: HOSPITAL | Age: 65
End: 2025-03-15
Payer: COMMERCIAL

## 2025-03-17 ENCOUNTER — HOSPITAL ENCOUNTER (OUTPATIENT)
Dept: CARDIOLOGY | Facility: HOSPITAL | Age: 65
Discharge: HOME | End: 2025-03-17
Attending: STUDENT IN AN ORGANIZED HEALTH CARE EDUCATION/TRAINING PROGRAM
Payer: COMMERCIAL

## 2025-03-17 ENCOUNTER — TELEPHONE (OUTPATIENT)
Dept: GASTROENTEROLOGY | Facility: CLINIC | Age: 65
End: 2025-03-17
Payer: COMMERCIAL

## 2025-03-17 VITALS
HEIGHT: 66 IN | SYSTOLIC BLOOD PRESSURE: 109 MMHG | BODY MASS INDEX: 32.47 KG/M2 | WEIGHT: 202 LBS | DIASTOLIC BLOOD PRESSURE: 56 MMHG | HEART RATE: 70 BPM

## 2025-03-17 DIAGNOSIS — R06.02 SHORTNESS OF BREATH: ICD-10-CM

## 2025-03-17 LAB
ASCENDING AORTA: 3.18 CM
AV AREA BY CONTINUOUS VTI: 3.2 CM2
AV INDEX (PROSTH): 0.84
AV LVOT MEAN GRADIENT: 3 MMHG
AV LVOT PEAK GRADIENT: 6 MMHG
AV MEAN GRADIENT: 4 MMHG
AV PEAK GRADIENT: 7 MMHG
AV VALVE AREA BY VELOCITY RATIO: 3.5 CM²
AV VALVE AREA: 3.2 CM2
AV VELOCITY RATIO: 0.92
BSA FOR ECHO PROCEDURE: 2.07 M2
CV ECHO LV RWT: 0.38 CM
DOP CALC AO PEAK VEL: 1.3 M/S
DOP CALC AO VTI: 25.7 CM
DOP CALC LVOT AREA: 3.8 CM2
DOP CALC LVOT DIAMETER: 2.2 CM
DOP CALC LVOT PEAK VEL: 1.2 M/S
DOP CALC LVOT STROKE VOLUME: 82.4 CM3
DOP CALCLVOT PEAK VEL VTI: 21.7 CM
E WAVE DECELERATION TIME: 141 MS
E/A RATIO: 0.73
E/E' RATIO: 8 M/S
ECHO EF ESTIMATED: 65 %
ECHO LV POSTERIOR WALL: 0.9 CM (ref 0.6–1.1)
FRACTIONAL SHORTENING: 36.2 % (ref 28–44)
INTERVENTRICULAR SEPTUM: 1.2 CM (ref 0.6–1.1)
IVC DIAMETER: 1.89 CM
IVRT: 88 MS
LA MAJOR: 4.5 CM
LA MINOR: 4.9 CM
LA WIDTH: 3.7 CM
LEFT ATRIUM SIZE: 3.6 CM
LEFT ATRIUM VOLUME INDEX MOD: 28 ML/M2
LEFT ATRIUM VOLUME INDEX: 26 ML/M2
LEFT ATRIUM VOLUME MOD: 57 ML
LEFT ATRIUM VOLUME: 53 CM3
LEFT INTERNAL DIMENSION IN SYSTOLE: 3 CM (ref 2.1–4)
LEFT VENTRICLE DIASTOLIC VOLUME INDEX: 49.75 ML/M2
LEFT VENTRICLE DIASTOLIC VOLUME: 100 ML
LEFT VENTRICLE MASS INDEX: 87.5 G/M2
LEFT VENTRICLE SYSTOLIC VOLUME INDEX: 17.4 ML/M2
LEFT VENTRICLE SYSTOLIC VOLUME: 35 ML
LEFT VENTRICULAR INTERNAL DIMENSION IN DIASTOLE: 4.7 CM (ref 3.5–6)
LEFT VENTRICULAR MASS: 175.8 G
LV LATERAL E/E' RATIO: 7.3
LV SEPTAL E/E' RATIO: 9.7
MV A" WAVE DURATION": 148.43 MS
MV PEAK A VEL: 0.8 M/S
MV PEAK E VEL: 0.58 M/S
OHS CV RV/LV RATIO: 0.51 CM
PISA TR MAX VEL: 2.2 M/S
PULM VEIN A" WAVE DURATION": 148.43 MS
PULM VEIN S/D RATIO: 1.64
PULMONIC VEIN PEAK A VELOCITY: 0.2 M/S
PV PEAK D VEL: 0.36 M/S
PV PEAK S VEL: 0.59 M/S
RA MAJOR: 4.46 CM
RA PRESSURE ESTIMATED: 3 MMHG
RA WIDTH: 2.83 CM
RIGHT ATRIAL AREA: 11.4 CM2
RIGHT VENTRICLE DIASTOLIC BASEL DIMENSION: 2.4 CM
RV TB RVSP: 5 MMHG
RV TISSUE DOPPLER FREE WALL SYSTOLIC VELOCITY 1 (APICAL 4 CHAMBER VIEW): 14.08 CM/S
SINUS: 3.18 CM
STJ: 2.71 CM
TDI LATERAL: 0.08 M/S
TDI SEPTAL: 0.06 M/S
TDI: 0.07 M/S
TRICUSPID ANNULAR PLANE SYSTOLIC EXCURSION: 2.5 CM
TV PEAK GRADIENT: 19 MMHG
TV REST PULMONARY ARTERY PRESSURE: 22 MMHG
Z-SCORE OF LEFT VENTRICULAR DIMENSION IN END DIASTOLE: -2.26
Z-SCORE OF LEFT VENTRICULAR DIMENSION IN END SYSTOLE: -1.47

## 2025-03-17 PROCEDURE — 93306 TTE W/DOPPLER COMPLETE: CPT

## 2025-03-17 PROCEDURE — 93306 TTE W/DOPPLER COMPLETE: CPT | Mod: 26,,, | Performed by: STUDENT IN AN ORGANIZED HEALTH CARE EDUCATION/TRAINING PROGRAM

## 2025-03-17 RX ORDER — OMEPRAZOLE 20 MG/1
20 CAPSULE, DELAYED RELEASE ORAL
Qty: 90 CAPSULE | Refills: 2 | Status: SHIPPED | OUTPATIENT
Start: 2025-03-17

## 2025-03-17 RX ORDER — SCOPOLAMINE 1 MG/3D
1 PATCH, EXTENDED RELEASE TRANSDERMAL
Qty: 7 PATCH | Refills: 2 | Status: SHIPPED | OUTPATIENT
Start: 2025-03-17

## 2025-03-17 RX ORDER — ONDANSETRON HYDROCHLORIDE 8 MG/1
8 TABLET, FILM COATED ORAL EVERY 8 HOURS PRN
Qty: 90 TABLET | Refills: 0 | Status: SHIPPED | OUTPATIENT
Start: 2025-03-17 | End: 2025-04-16

## 2025-03-17 NOTE — PROGRESS NOTES
Called patient regarding her refills for scopolamine patch, Prilosec, and Compazine.    She continues to have nausea which is controlled with medications.  I asked her if she was still taking Elavil.  Per patient and caregiver, no longer taking Elavil.  We will ask psychiatrist if okay to continue taking since this can help with GI symptoms.    Nima Johnson MD

## 2025-03-17 NOTE — TELEPHONE ENCOUNTER
Copied from CRM #[id number]. Topic: Medications - Medication Question  >> Mar 17, 2025  1:29 PM Blanca wrote:  Pt's care giver ( Jess )  called regarding a antibiotic that could not be filled until she first tried the rifaximimn per her insurance and now that she has finished taking that medication they are wanting to know if the other antibiotics can be ordered.      Please call back ( Jess ) care give at [phone].

## 2025-03-18 ENCOUNTER — RESULTS FOLLOW-UP (OUTPATIENT)
Dept: CARDIOLOGY | Facility: HOSPITAL | Age: 65
End: 2025-03-18

## 2025-03-18 ENCOUNTER — PATIENT MESSAGE (OUTPATIENT)
Dept: GASTROENTEROLOGY | Facility: CLINIC | Age: 65
End: 2025-03-18
Payer: COMMERCIAL

## 2025-03-20 PROBLEM — R91.8 LUNG MASS: Status: ACTIVE | Noted: 2017-07-02

## 2025-03-20 PROBLEM — F33.1 MODERATE RECURRENT MAJOR DEPRESSION: Status: ACTIVE | Noted: 2020-07-07

## 2025-03-20 PROBLEM — J18.9 PNEUMONIA: Status: ACTIVE | Noted: 2019-08-20

## 2025-03-20 PROBLEM — E11.9 TYPE 2 DIABETES MELLITUS: Status: ACTIVE | Noted: 2025-03-20

## 2025-03-20 PROBLEM — Z99.81 OXYGEN DEPENDENT: Status: ACTIVE | Noted: 2020-07-07

## 2025-03-20 PROBLEM — G90.09 IDIOPATHIC PERIPHERAL AUTONOMIC NEUROPATHY: Status: ACTIVE | Noted: 2020-07-07

## 2025-03-20 PROBLEM — E63.9 UNDERNUTRITION: Status: ACTIVE | Noted: 2020-07-07

## 2025-03-24 ENCOUNTER — OFFICE VISIT (OUTPATIENT)
Dept: PULMONOLOGY | Facility: CLINIC | Age: 65
End: 2025-03-24
Payer: COMMERCIAL

## 2025-03-24 VITALS
OXYGEN SATURATION: 95 % | BODY MASS INDEX: 32.24 KG/M2 | HEIGHT: 66 IN | SYSTOLIC BLOOD PRESSURE: 120 MMHG | DIASTOLIC BLOOD PRESSURE: 70 MMHG | HEART RATE: 75 BPM | WEIGHT: 200.63 LBS

## 2025-03-24 DIAGNOSIS — J44.9 CHRONIC OBSTRUCTIVE PULMONARY DISEASE, UNSPECIFIED COPD TYPE: Primary | ICD-10-CM

## 2025-03-24 DIAGNOSIS — J96.11 CHRONIC HYPOXEMIC RESPIRATORY FAILURE: ICD-10-CM

## 2025-03-24 DIAGNOSIS — D86.9 SARCOIDOSIS: ICD-10-CM

## 2025-03-24 DIAGNOSIS — F17.210 CIGARETTE NICOTINE DEPENDENCE WITHOUT COMPLICATION: ICD-10-CM

## 2025-03-24 PROCEDURE — 99214 OFFICE O/P EST MOD 30 MIN: CPT | Mod: S$GLB,,, | Performed by: INTERNAL MEDICINE

## 2025-03-24 PROCEDURE — 3008F BODY MASS INDEX DOCD: CPT | Mod: CPTII,S$GLB,, | Performed by: INTERNAL MEDICINE

## 2025-03-24 PROCEDURE — 99999 PR PBB SHADOW E&M-EST. PATIENT-LVL V: CPT | Mod: PBBFAC,,, | Performed by: INTERNAL MEDICINE

## 2025-03-24 PROCEDURE — 1159F MED LIST DOCD IN RCRD: CPT | Mod: CPTII,S$GLB,, | Performed by: INTERNAL MEDICINE

## 2025-03-24 PROCEDURE — 1101F PT FALLS ASSESS-DOCD LE1/YR: CPT | Mod: CPTII,S$GLB,, | Performed by: INTERNAL MEDICINE

## 2025-03-24 PROCEDURE — 3078F DIAST BP <80 MM HG: CPT | Mod: CPTII,S$GLB,, | Performed by: INTERNAL MEDICINE

## 2025-03-24 PROCEDURE — 3288F FALL RISK ASSESSMENT DOCD: CPT | Mod: CPTII,S$GLB,, | Performed by: INTERNAL MEDICINE

## 2025-03-24 PROCEDURE — 3074F SYST BP LT 130 MM HG: CPT | Mod: CPTII,S$GLB,, | Performed by: INTERNAL MEDICINE

## 2025-03-24 RX ORDER — FLUTICASONE FUROATE 100 UG/1
1 POWDER RESPIRATORY (INHALATION) DAILY
Qty: 30 EACH | Refills: 11 | Status: SHIPPED | OUTPATIENT
Start: 2025-03-24

## 2025-03-24 RX ORDER — TIOTROPIUM BROMIDE INHALATION SPRAY 3.12 UG/1
2 SPRAY, METERED RESPIRATORY (INHALATION) DAILY
Qty: 4 G | Refills: 11 | Status: SHIPPED | OUTPATIENT
Start: 2025-03-24

## 2025-03-24 RX ORDER — AZITHROMYCIN 250 MG/1
TABLET, FILM COATED ORAL
Qty: 6 TABLET | Refills: 0 | Status: SHIPPED | OUTPATIENT
Start: 2025-03-24 | End: 2025-03-29
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
## 2025-03-26 ENCOUNTER — OFFICE VISIT (OUTPATIENT)
Dept: HEMATOLOGY/ONCOLOGY | Facility: CLINIC | Age: 65
End: 2025-03-26
Payer: COMMERCIAL

## 2025-03-26 VITALS
WEIGHT: 201.38 LBS | RESPIRATION RATE: 17 BRPM | BODY MASS INDEX: 32.36 KG/M2 | OXYGEN SATURATION: 95 % | DIASTOLIC BLOOD PRESSURE: 62 MMHG | HEIGHT: 66 IN | SYSTOLIC BLOOD PRESSURE: 130 MMHG | HEART RATE: 110 BPM | TEMPERATURE: 99 F

## 2025-03-26 DIAGNOSIS — D64.9 ANEMIA, UNSPECIFIED TYPE: ICD-10-CM

## 2025-03-26 DIAGNOSIS — D69.6 THROMBOCYTOPENIA: ICD-10-CM

## 2025-03-26 DIAGNOSIS — C91.10 CLL (CHRONIC LYMPHOCYTIC LEUKEMIA): Primary | ICD-10-CM

## 2025-03-26 PROCEDURE — 99215 OFFICE O/P EST HI 40 MIN: CPT | Mod: S$GLB,,, | Performed by: INTERNAL MEDICINE

## 2025-03-26 PROCEDURE — 99999 PR PBB SHADOW E&M-EST. PATIENT-LVL V: CPT | Mod: PBBFAC,,, | Performed by: INTERNAL MEDICINE

## 2025-03-26 PROCEDURE — 3288F FALL RISK ASSESSMENT DOCD: CPT | Mod: CPTII,S$GLB,, | Performed by: INTERNAL MEDICINE

## 2025-03-26 PROCEDURE — 3008F BODY MASS INDEX DOCD: CPT | Mod: CPTII,S$GLB,, | Performed by: INTERNAL MEDICINE

## 2025-03-26 PROCEDURE — 1101F PT FALLS ASSESS-DOCD LE1/YR: CPT | Mod: CPTII,S$GLB,, | Performed by: INTERNAL MEDICINE

## 2025-03-26 PROCEDURE — 3075F SYST BP GE 130 - 139MM HG: CPT | Mod: CPTII,S$GLB,, | Performed by: INTERNAL MEDICINE

## 2025-03-26 PROCEDURE — 1126F AMNT PAIN NOTED NONE PRSNT: CPT | Mod: CPTII,S$GLB,, | Performed by: INTERNAL MEDICINE

## 2025-03-26 PROCEDURE — 3078F DIAST BP <80 MM HG: CPT | Mod: CPTII,S$GLB,, | Performed by: INTERNAL MEDICINE

## 2025-03-26 RX ORDER — RISPERIDONE 0.25 MG/1
0.25 TABLET ORAL 2 TIMES DAILY
COMMUNITY
Start: 2025-03-25

## 2025-03-26 NOTE — PROGRESS NOTES
Lymphoma Clinic Visit     Reason for visit: Follow-up CLL     History of Present Illness:  Ms. Abdul is a very pleasant 64 year old White woman who presents for follow-up of CLL.  She previously followed with Bobby Gonzalez and [facility] and is transitioning into my clinic.  She is accompanied by her caretaker.  Today, she denies fever/chills, night sweats, unintentional weight loss, enlarged LNs.  She endorses fatigue and difficulty sleeping due to restless legs syndrome.  She also notes abd pain, N/V (persists despite use of ondansetron and transdermal patch), constipation/diarrhea, and recent C diff - she underwent EGD/colonoscopy.  She has Hepatology appt in a few weeks.  She reports constant stress in her marital situation in which  has restricted credit card/key access - she notes depression but denies SI/HI, she follows with Psychiatry.  Caretaker who accompanies this pt to visit states that pt has been recently diagnosed with frontal lobe dementia and sees Neuro.  She also reports that pt has heavy EtOH use and drank a full bottle of vodka in one sitting last week.  She also has pulmonary sarcoidosis and uses 3L O2 at night for sleep apnea (not CPAP) and prn during the day.  She just started Z-allyssa by Pulm recently.     Oncology History:  6/10/22: wbc 22.5K, PB flow --> CLL phenotype, FISH: del 13q  10/31/23: neg TP53 mutation, pos IGHV mutation  2/13/25: CT A/P w/ LAD noted in bilat iliac chain LNs, splenomegaly     Past medical history:  - Stage IA HR+ breast cancer s/p bilat mastectomy  - DM II  - HTN  - HLD  - EtOH misuse  - Seizure 2/2 EtOH withdrawal  - Chronic back pain  - Pulmonary sarcoidosis  - Frontal lobe dementia  - Depression/history of suicide attempt  - Aortic atherosclerosis  - C diff colitis  - COPD  - Diverticulitis  - Fatty liver  - GERD  - Pancreatitis      Past surgical history:   - Bilat mastectomy/R axillary SLNBx/insertion of breast tissue expander  - Bilat breast revision  surgery/implants/liposuction  - Appendectomy  - Hysterectomy  - Tubal ligation  - Tonsillectomy  - Mediastinoscopy  - Implantation of sacral nerve stimulator     Medications:  Current Outpatient Medications   Medication Instructions    amitriptyline (ELAVIL) 25-50 mg, Nightly    apixaban (ELIQUIS) 5 mg Tab 1 tablet, 2 times daily    azithromycin (Z-DENVER) 250 MG tablet Take 2 tablets by mouth on day 1; Take 1 tablet by mouth on days 2-5      clonazePAM (KLONOPIN) 0.25 mg, Nightly    cyanocobalamin (VITAMIN B-12) 100 mcg, Daily    diclofenac sodium (VOLTAREN) 2 g, Topical (Top), 4 times daily    donepeziL (ARICEPT) 5 MG tablet 1 tablet, Nightly    ergocalciferol (ERGOCALCIFEROL) 50,000 unit Cap 1 capsule, Every 7 days    EScitalopram oxalate (LEXAPRO) 10 mg, Daily    fluticasone furoate (ARNUITY ELLIPTA) 100 mcg/actuation inhaler 1 puff, Inhalation, Daily, Rinse mouth after each use.    folic acid (FOLVITE) 1 mg, Oral, Daily    gabapentin (NEURONTIN) 300 mg, Oral, 3 times daily    hydrOXYzine pamoate (VISTARIL) 50 mg, Daily PRN    levothyroxine (SYNTHROID) 75 mcg, Oral, Before breakfast    magnesium oxide (MAG-OX) 400 mg, Oral, Every morning    metFORMIN (GLUCOPHAGE-XR) 500 mg, Oral, Daily    modafiniL (PROVIGIL) 100 mg, Every morning    multivitamin Tab 1 tablet, Oral, Daily    naltrexone (DEPADE) 50 mg, Daily    naproxen (NAPROSYN) 500 mg, Oral, 2 times daily    omega-3 fatty acids 1,000 mg Cap 1 capsule, Daily    omega-3 fatty acids/fish oil (FISH OIL-OMEGA-3 FATTY ACIDS) 300-1,000 mg capsule 2 capsules, Daily    omeprazole (PRILOSEC) 20 mg, Oral, Before breakfast    ondansetron (ZOFRAN) 8 mg, Oral, Every 8 hours PRN    propranoloL (INDERAL) 20 mg, 2 times daily    QUEtiapine (SEROQUEL) 50 mg, Nightly    QULIPTA 60 mg, Daily    risperiDONE (RISPERDAL) 0.25 mg, 2 times daily    scopolamine (TRANSDERM-SCOP) 1.3-1.5 mg (1 mg over 3 days) 1 patch, Transdermal, Every 72 hours    tiotropium bromide (SPIRIVA RESPIMAT) 2.5  "mcg/actuation inhaler 2 puffs, Inhalation, Daily, Controller    traZODone (DESYREL) 200 mg, Oral, Nightly    TRINTELLIX 5 mg, Every morning    TURMERIC ORAL 250 mg, Every morning          Allergies:  Review of patient's allergies indicates:   Allergen Reactions    Lortab [hydrocodone-acetaminophen] Itching    Promethazine Itching and Other (See Comments)    Albuterol Confusion         Social History:  Smokes cigarettes (~5-6 cigarettes/day) and vapes hourly.  Pt denies EtOH/IDU but caretaker reports heavy EtOH use.  Lives w/ .  Retired, former artist ().     Family History:  Daughter - breast CA      Review of Systems:  As per HPI.  12 point ROS conducted and otherwise negative.     Physical Exam:  Vitals:    03/26/25 1301   BP: 130/62   BP Location: Left arm   Patient Position: Sitting   Pulse: 110   Resp: 17   Temp: 98.8 °F (37.1 °C)   TempSrc: Oral   SpO2: 95%   Weight: 91.3 kg (201 lb 6.2 oz)   Height: 5' 6" (1.676 m)       Gen: WDWN, pleasant, talkative, oriented x 2, ambulatory, NAD  HEENT: Normocephalic, atraumatic, EOMI, anicteric sclerae  Lymph: no palpable cervical, supraclavicular, axillary, or inguinal LAD  Pulm: +wheezing  CV: RR, no M/R/G, no LE edema  Abd: soft, NTND, unable to assess for SM due to body habitus  Skin: no rash or lesions  Neuro: CN II-XII grossly intact  Psych: cooperative, normal speech/eye contact, blunted affect    ECOG Performance Status: 2 (due to depression/dementia)  Karnofsky PS: 60     Labs/Imaging/Pathology: Reviewed in Epic, pertinent data included in Oncology History    Assessment/Plan:  65 yo woman with CLL    Chronic lymphocytic leukemia  Recent labs reviewed  No overt evidence of disease progression - however, will reevaluate for CLL therapy pending results of hepatology evaluation as symptoms overlap between diseases  IgG wnl on 2/12/25  Repeat labs at next visit     Anemia  Consistent since [id number]  Recent ferritin noted to be 15 c/w iron " deficiency  EGD/colonoscopy performed on 1/9/25 - stomach biopsy c/w erosive/chemical type gastropathy, H pylori neg, no evidence of microscopic colitis     Thrombocytopenia  Consistent since 2018  May be 2/2 liver disease/EtOH      BMT Chart Routing      Follow up with physician 2 months.   Follow up with TERESO    Provider visit type Malignant hem   Infusion scheduling note    Injection scheduling note    Labs    Imaging    Pharmacy appointment    Other referrals              Encounter: 60 min total time spent including time  Preparing to see the patient   Obtaining and/or reviewing separately obtained history   Performing a medically appropriate examination and/or evaluation   Counseling and educating the patient/caregiver  Documenting clinical information in the EMR       Manisha Perez MD  Malignant Hematology, Stem Cell Transplantation, and Cellular Therapy  [facility]  [facility] [place]

## 2025-03-27 ENCOUNTER — OFFICE VISIT (OUTPATIENT)
Dept: OTOLARYNGOLOGY | Facility: CLINIC | Age: 65
End: 2025-03-27
Payer: COMMERCIAL

## 2025-03-27 ENCOUNTER — CLINICAL SUPPORT (OUTPATIENT)
Dept: AUDIOLOGY | Facility: CLINIC | Age: 65
End: 2025-03-27
Payer: COMMERCIAL

## 2025-03-27 DIAGNOSIS — R42 VERTIGO: ICD-10-CM

## 2025-03-27 DIAGNOSIS — H90.A22 SENSORINEURAL HEARING LOSS (SNHL) OF LEFT EAR WITH RESTRICTED HEARING OF RIGHT EAR: Primary | ICD-10-CM

## 2025-03-27 DIAGNOSIS — R42 DIZZINESS AND GIDDINESS: ICD-10-CM

## 2025-03-27 PROCEDURE — 99204 OFFICE O/P NEW MOD 45 MIN: CPT | Mod: S$GLB,,, | Performed by: OTOLARYNGOLOGY

## 2025-03-27 PROCEDURE — 3288F FALL RISK ASSESSMENT DOCD: CPT | Mod: CPTII,S$GLB,, | Performed by: OTOLARYNGOLOGY

## 2025-03-27 PROCEDURE — 1101F PT FALLS ASSESS-DOCD LE1/YR: CPT | Mod: CPTII,S$GLB,, | Performed by: OTOLARYNGOLOGY

## 2025-03-27 PROCEDURE — 1126F AMNT PAIN NOTED NONE PRSNT: CPT | Mod: CPTII,S$GLB,, | Performed by: OTOLARYNGOLOGY

## 2025-03-27 PROCEDURE — 92557 COMPREHENSIVE HEARING TEST: CPT | Mod: S$GLB,,, | Performed by: AUDIOLOGIST

## 2025-03-27 PROCEDURE — 99999 PR PBB SHADOW E&M-EST. PATIENT-LVL I: CPT | Mod: PBBFAC,,, | Performed by: AUDIOLOGIST

## 2025-03-27 PROCEDURE — 99999 PR PBB SHADOW E&M-EST. PATIENT-LVL IV: CPT | Mod: PBBFAC,,, | Performed by: OTOLARYNGOLOGY

## 2025-03-27 PROCEDURE — 92567 TYMPANOMETRY: CPT | Mod: S$GLB,,, | Performed by: AUDIOLOGIST

## 2025-03-27 RX ORDER — RIMEGEPANT SULFATE 75 MG/75MG
75 TABLET, ORALLY DISINTEGRATING ORAL EVERY OTHER DAY
Qty: 15 TABLET | Refills: 1 | Status: SHIPPED | OUTPATIENT
Start: 2025-03-27

## 2025-03-27 NOTE — PROGRESS NOTES
Earl Abdul, a 64 y.o. female, was seen today in the clinic for an audiologic evaluation.  Patient's main complaint was dizziness. Patient reported she has been experiencing dizziness for about two months. Dizziness was described as imbalance and spinning/movement sensation. Patient denied tinnitus.    Tympanometry revealed Type As in the right ear and Type A in the left ear.     Audiogram results revealed normal to mild sensorineural hearing loss in the right ear and normal to moderate sensorineural hearing loss in the left ear.  Speech reception thresholds were noted at 25 dB in the right ear and 25 dB in the left ear.  Speech discrimination scores were 100% in the right ear and 100% in the left ear.    Recommendations:  Otologic evaluation  Annual audiogram  Hearing protection when in noise  Hearing aid consultation

## 2025-03-27 NOTE — PROGRESS NOTES
Ear, Nose, & Throat  Otolaryngology - Head & Neck Surgery    Summary of Visit:  Earl Abdul was referred to me by Dr. David Berumen in consultation for Dizziness      Subjective:     Chief Complaint:   Chief Complaint   Patient presents with    Dizziness       Earl Abdul is a 64 y.o. female who was referred to me by Dr. David Berumen in consultation for dizziness. She reports months of constant room spinning for several weeks.  She denies any subjective hearing loss.  She has no subjective tinnitus, otalgia, otorrhea.  She is experiencing some signs of dementia and has MRI pending for this evaluation    Past Medical History  Active Ambulatory Problems     Diagnosis Date Noted    Alcohol use disorder, severe, dependence 02/07/2014    Alcohol withdrawal 02/07/2014    Essential hypertension 02/07/2014    Fibromyalgia 02/07/2014    Cigarette nicotine dependence without complication 02/07/2014    Cannabis abuse, in remission 09/08/2011    Sarcoidosis 03/31/2011    DEVANTE (acute kidney injury) 10/14/2014    History of Clostridium difficile colitis 10/15/2014    Diarrhea 10/15/2014    Dehydration 10/15/2014    Nausea 10/16/2014    Pyelonephritis due to Escherichia coli 10/16/2014    Hypophosphatemia 10/18/2014    Hypomagnesemia 10/19/2014    Hypotension due to hypovolemia 09/29/2016    New onset seizure 07/20/2017    Posterior reversible encephalopathy syndrome 07/22/2017    Depression with anxiety 08/16/2017    Erythema nodosum 08/27/2013    History of sarcoidosis 07/26/2017    Hyperlipidemia 11/30/2012    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    Degenerative joint disease (DJD) of lumbar spine 11/20/2017    Decreased ROM of lumbar spine 11/14/2018    Difficulty walking 09/24/2019    Thrombocytopenia 10/26/2019    Hypokalemia 02/10/2020    VINICIO (obstructive sleep apnea)     At risk for lymphedema 10/15/2020    Malignant neoplasm of central portion of right breast in female, estrogen receptor positive 11/18/2020     COPD (chronic obstructive pulmonary disease) 04/17/2021    Incontinence of feces 11/30/2021    Low serum vitamin B12 11/30/2021    Anemia 11/30/2021    Urge incontinence of urine 11/30/2021    Hypothyroidism 11/30/2021    Type 2 diabetes mellitus without complication, without long-term current use of insulin 11/30/2021    Obesity (BMI 30.0-34.9) 11/30/2021    Fecal incontinence 12/21/2021    Mixed incontinence 12/21/2021    Pelvic floor tension 12/21/2021    COVID-19 01/08/2022    Hypoxia     Shortness of breath     Starvation ketoacidosis 04/29/2022    E coli bacteremia 05/04/2022    Lymphocytosis 06/27/2022    Migraine without aura and with status migrainosus, not intractable 06/29/2022    OAB (overactive bladder) 07/12/2022    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 09/27/2022    Refeeding syndrome 09/28/2022    CLL (chronic lymphocytic leukemia) 09/30/2022    Migraine 09/30/2022    Esophagitis 03/13/2023    Leg weakness, bilateral 04/01/2023    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 07/07/2020    Depression 04/26/2023    Palliative care encounter 04/27/2023    Advance care planning 04/27/2023    Goals of care, counseling/discussion 04/27/2023    Hypernatremia 06/16/2023    Metabolic acidosis 09/17/2023    Suicide attempt by cutting of wrist 11/23/2023    Hyperkalemia 11/23/2023    Urinary retention 11/24/2023    SIRS (systemic inflammatory response syndrome) 11/24/2023    Rhabdomyolysis 02/10/2024    Left forearm pain 02/10/2024    Paroxysmal atrial fibrillation 03/01/2024    Weakness of left upper extremity 03/02/2024    Anemia, chronic disease 03/02/2024    Subclinical hyperthyroidism 03/02/2024    Leukocytosis 04/07/2024    Moderate malnutrition 04/12/2024    Aortic atherosclerosis 04/17/2024    Dementia associated with alcoholism 08/06/2024    Chronic hypoxemic respiratory failure 09/16/2024    Oxygen dependent 07/07/2020     Type 2 diabetes mellitus 03/20/2025    Undernutrition 07/07/2020    Idiopathic peripheral autonomic neuropathy 07/07/2020    Moderate recurrent major depression 07/07/2020    Pneumonia 08/20/2019    Lung mass 07/02/2017     Resolved Ambulatory Problems     Diagnosis Date Noted    Anxiety and Depression 02/07/2014    Sarcoidosis of lung 02/07/2014    Acute alcoholic hepatitis 03/31/2011    Alcoholic fatty liver 05/19/2010    Closed dislocation of ankle 01/09/2012    Contusion of back 12/17/2009    Contusion of knee 12/17/2009    Nausea alone 01/09/2011    Nonspecific abnormal results of liver function study 05/19/2010    Other and unspecified alcohol dependence, continuous drinking behavior 05/01/2011    Other and unspecified alcohol dependence, in remission 09/08/2011    Other and unspecified alcohol dependence, unspecified drinking behavior 05/31/2011    Person with feared complaint in whom no diagnosis was made 11/08/2010    Sprain of ankle, unspecified site 01/09/2012    Sepsis 10/14/2014    Severe sepsis     Elevated liver enzymes 10/16/2014    Hypokalemia 10/16/2014    Blood in stool 10/16/2014    Abdominal pain 10/16/2014    Fungal infection of skin 10/18/2014    Bloody emesis 10/25/2014    Clostridium enterocolitis 10/26/2014    Ascites 10/26/2014    Cirrhosis 12/28/2015    Altered mental status, unspecified     Lethargy 09/30/2016    Viral upper respiratory tract infection 09/30/2016    Abdominal pain 07/20/2017    Intractable nausea and vomiting 07/20/2017    Chest pain 07/26/2017    Nausea vomiting and diarrhea 07/27/2017    Generalized abdominal pain 07/28/2017    Benign essential hypertension 11/30/2012    PUD (peptic ulcer disease) 08/16/2017    RLS (restless legs syndrome) 08/16/2017    Weakness 11/14/2018    Muscle weakness of lower extremity 09/24/2019    Greater trochanteric bursitis 10/10/2019    Suicidal ideation 10/26/2019    Substance abuse     Intractable vomiting with nausea 02/10/2020     Transaminitis 02/10/2020    Nausea and Vomiting with Epigastric Pain 04/17/2021    Prolonged QT interval 04/18/2021    Alcoholic gastritis without bleeding 04/19/2021    Chronic respiratory failure with hypoxia 01/08/2022    Acute hypercapnic respiratory failure 04/01/2023    Acute hypoxemic respiratory failure 06/16/2023    GERD (gastroesophageal reflux disease)     Lactic acidosis 09/17/2023    Acute alcoholic intoxication with complication 09/17/2023    Atrial fibrillation with RVR 04/11/2024     Past Medical History:   Diagnosis Date    Alcoholism     C. difficile colitis     Cancer of breast 10/2020    Controlled type 2 diabetes mellitus without complication, without long-term current use of insulin 11/30/2021    Diverticulitis     Fatty liver     Hypertension     Pancreatitis     Peptic ulcer disease     Polysubstance abuse     Seizures     Suicide attempt        Past Surgical History  She has a past surgical history that includes Hysterectomy; Appendectomy; mediastenoscopy; Tubal ligation; Tonsillectomy (N/A, 1970); Esophagogastroduodenoscopy (10/7/2016, 11/6/2014); Flexible sigmoidoscopy (11/06/2014); Colonoscopy (N/A, 7/28/2017); Injection of joint (Right, 10/10/2019); Esophagogastroduodenoscopy (N/A, 2/11/2020); Bilateral mastectomy (Bilateral, 10/29/2020); [place] lymph node biopsy (Right, 10/29/2020); Injection for sentinel node identification (Right, 10/29/2020); Insertion of breast tissue expander (Bilateral, 10/29/2020); Breast revision surgery (Bilateral, 2/11/2021); Replacement of implant of breast (Bilateral, 2/11/2021); Liposuction (Bilateral, 2/11/2021); Esophagogastroduodenoscopy (N/A, 4/19/2021); Implantation of permanent sacral nerve stimulator (N/A, 7/12/2022); Esophagogastroduodenoscopy (N/A, 1/9/2025); and colonoscopy, screening, high risk patient (N/A, 1/9/2025).    Past Surgical History:   Procedure Laterality Date    APPENDECTOMY      BILATERAL MASTECTOMY Bilateral 10/29/2020     Procedure: MASTECTOMY, BILATERAL;  Surgeon: Baylee Kevin MD;  Location: [address];  Service: General;  Laterality: Bilateral;    BREAST REVISION SURGERY Bilateral 2/11/2021    Procedure: BREAST REVISION SURGERY;  Surgeon: Scottie Johnson MD;  Location: [facility] OR [address] [place] [address];  Service: Plastics;  Laterality: Bilateral;    COLONOSCOPY N/A 7/28/2017    Procedure: COLONOSCOPY;  Surgeon: Aaron Alvarado MD;  Location: [facility];  Service: Endoscopy;  Laterality: N/A;    COLONOSCOPY, SCREENING, HIGH RISK PATIENT N/A 1/9/2025    Procedure: COLONOSCOPY, SCREENING, HIGH RISK PATIENT;  Surgeon: Aayush Valente MD;  [facility]  Service: Endoscopy;  Laterality: N/A;    ESOPHAGOGASTRODUODENOSCOPY  10/7/2016, 11/6/2014    2016 - gastritis, duodenitis, 2014 erosive gastritis    ESOPHAGOGASTRODUODENOSCOPY N/A 2/11/2020    Procedure: ESOPHAGOGASTRODUODENOSCOPY (EGD);  Surgeon: Fawn Garrido MD;  Location: [facility];  Service: Endoscopy;  Laterality: N/A;    ESOPHAGOGASTRODUODENOSCOPY N/A 4/19/2021    Procedure: EGD (ESOPHAGOGASTRODUODENOSCOPY);  Surgeon: Paramjit Martino MD;  Location: [facility];  Service: Endoscopy;  Laterality: N/A;    ESOPHAGOGASTRODUODENOSCOPY N/A 1/9/2025    Procedure: EGD (ESOPHAGOGASTRODUODENOSCOPY);  Surgeon: Aayush Valente MD;  Location: [facility] ([address] [place] [address]);  Service: Endoscopy;  Laterality: N/A;  referral dr santiago/ prep inst given in office/ pt requested sutabs/diabetic/eliquis  11/12 Precall performed. Pt needs rx called and instructions resent to [facility]  11/13/24- Rx and instructions sent as requested. DBM  11/14 pt r/s, Ok to hold Eliqui    FLEXIBLE SIGMOIDOSCOPY  11/06/2014    colitis    HYSTERECTOMY      IMPLANTATION OF PERMANENT SACRAL NERVE STIMULATOR N/A 7/12/2022    Procedure: INSERTION, NEUROSTIMULATOR, PERMANENT, SACRAL;  Surgeon: Juaquin Edwards MD;  Location: [facility] OR 2ND FLR;  Service: Urology;  Laterality: N/A;  1hr    INJECTION FOR SENTINEL  NODE IDENTIFICATION Right 10/29/2020    Procedure: INJECTION, FOR SENTINEL NODE IDENTIFICATION;  Surgeon: Baylee Kevin MD;  Location: [address];  Service: General;  Laterality: Right;    INJECTION OF JOINT Right 10/10/2019    Procedure: Injection, Joint RIGHT ILIOPSOAS BURSA/TENDON INJECTION AND RIGHT GLUTEAL TENDON INJECTION WITH STEROID AND LIDOCAINE;  Surgeon: Guillaume Rico MD;  Location: [facility];  Service: Pain Management;  Laterality: Right;  NEEDS CONSENT    INSERTION OF BREAST TISSUE EXPANDER Bilateral 10/29/2020    Procedure: INSERTION, TISSUE EXPANDER, BREAST;  Surgeon: Scottie Johnson MD;  Location: [address];  Service: Plastics;  Laterality: Bilateral;  Right breast: 1082 g  Left breast: 1076 g    LIPOSUCTION Bilateral 2/11/2021    Procedure: LIPOSUCTION;  Surgeon: Scottie Johnson MD;  Location: [facility] OR [address] [place] [address];  Service: Plastics;  Laterality: Bilateral;    mediastenoscopy      REPLACEMENT OF IMPLANT OF BREAST Bilateral 2/11/2021    Procedure: REPLACEMENT, IMPLANT, BREAST;  Surgeon: Scottie Johnson MD;  Location: [address];  Service: Plastics;  Laterality: Bilateral;    SENTINEL LYMPH NODE BIOPSY Right 10/29/2020    Procedure: BIOPSY, LYMPH NODE, SENTINEL;  Surgeon: Baylee Kevin MD;  Location: [address];  Service: General;  Laterality: Right;    TONSILLECTOMY N/A 1970    TUBAL LIGATION          Family History  Her family history includes Breast cancer in her daughter and maternal aunt; Colon cancer in her maternal uncle; Diabetes in her father and mother; Heart attack in her father; Hypertension in her father and mother.    Social History  She reports that she has been smoking vaping with nicotine and cigarettes. She started smoking about 34 years ago. She has a 15 pack-year smoking history. She has never used smokeless tobacco. She reports that she does not currently use alcohol. She reports that she does not currently use drugs after having used  the following drugs: Marijuana.    Allergies  She is allergic to lortab [hydrocodone-acetaminophen], promethazine, and albuterol.    Medications  She has a current medication list which includes the following prescription(s): amitriptyline, apixaban, qulipta, azithromycin, clonazepam, cyanocobalamin, diclofenac sodium, donepezil, ergocalciferol, escitalopram oxalate, arnuity ellipta, folic acid, gabapentin, hydroxyzine pamoate, lancets, levothyroxine, magnesium oxide, metformin, modafinil, multivitamin, naltrexone, naproxen, omega-3 fatty acids, fish oil-omega-3 fatty acids, omeprazole, ondansetron, ondansetron, propranolol, quetiapine, risperidone, scopolamine, spiriva respimat, trazodone, turmeric, trintellix, and [DISCONTINUED] omeprazole, and the following Facility-Administered Medications: albuterol sulfate.    ROS:  Pertinent positive and negative review of systems as noted in HPI.     Objective:     There were no vitals taken for this visit.   General Appearance:   Awake, Alert and Oriented. NAD. Appropriate affect and appearance      Neuro:   Spontaneous eye opening, appropriate verbal responses, follows commands  Pupils equal, round & brisk. EOMI, no proptosis  Face is symmetric, HB I, non-edematous bilaterally  Vision grossly intact, Hearing grossly intact     Head and Face:   skin is intact with no lesions noted.  Parotid and submandibular glands are symmetric and non-tender.      Ears:  Periauricular regions non-erythematous, non-fluctuanct non-tender  Pinna normal bilaterally, no skin lesions  EACs patent and without drainage bilaterally   Tympanic membranes are normal in appearance bilaterally.  No middle ear effusion noted bilaterally.    Nose:   External nose is symmetric, no skin lesions  Septum midline, No inferior turbinate hypertrophy, No polyps or rhinorrhea     OC/OP:  Tongue midline on extension, non-edematous, soft  No labial, buccal, oral tongue or floor of mouth lesions  Soft palate  symmetric, midline and without lesions or masses, tonsils symmetric  No masses or lesions of the visualized oropharynx     Neck:  Neck is symmetric, non-edematous, non-erythematous  Trachea is midline and easily palpable,  No palpable adenopathy or masses in levels I-VI  No thyroid nodules or masses, non-tender      Respiratory:  Normal work of breathing, no accessory muscle use, no stridor     Voice:  Normal vocal quality, volume and articulation    Vestibular:   Spontaneous Nystagmus: none   Smooth Pursuit: grossly unremarkable   Saccades: grossly unremarkable     Gaze-Evoked Nystagmus: None   Head-Thrust: Normal   Gait: Normal    Sutton-Hallpike: Normal bilaterally    Post-Head Shake:  No nystagmus but patient notes dizzy feeling    Fukuda Step Test: Unable to perform  Neuro/Psychiatric:     Affect: Normal   Cognition: Appropriate  Cerebellar   Alternating Finger to Nose: DNT   Rapid Alternating Movements: DNT  Proprioception   Romberg:  Very unstable, falls both backwards and to the left      Data Review:   LABS    IMAGING        AUDIO    I independently reviewed the tracings of the complete audiometric evaluation performed today.  I reviewed the audiogram with the patient as well.  Pertinent findings include  mild-to-moderate sensorineural hearing loss with slight asymmetry in the left ear at 1000 and 2000 hertz .      Procedures:       Assessment:     1. Vertigo        Plan:     I had a long discussion with the patient and her   regarding her condition and the further workup and management options.  He has no focal findings consistent with a single vestibular diagnosis.  Her audiogram is unremarkable other than minimal asymmetry in the mid frequency on the left.  My suspicion is that she is experiencing vestibular migraine.  Nurtec as prescribed.  In the possibility is that her imbalance is a function of a more generalized central neurologic process.  MRI is pending and we will await these results.  She  will contact me via [employer] in the next 2 weeks to assess her response.    No orders of the defined types were placed in this encounter.         Problem List Items Addressed This Visit    None  Visit Diagnoses         Vertigo                 Answers submitted by the patient for this visit:  Review of Symptoms Questionnaire  (Submitted on 3/27/2025)  Fatigue (Tiredness)?: Yes  None of these: Yes  None of these : Yes  Sleep Apnea?: Yes  shortness of breath: Yes  cough: Yes  None of these : Yes  abdominal pain: Yes  None of these: Yes  Muscle aches / pain?: Yes  back pain: Yes  None of these : Yes  None of these: Yes  None of these : Yes  dizziness: Yes  headaches: Yes  tremors: Yes  Light-headedness: Yes  None of these: Yes  decreased concentration: Yes  Feeling depressed?: Yes  nervous/ anxious: Yes  sleep disturbance: Yes

## 2025-03-29 ENCOUNTER — HOSPITAL ENCOUNTER (EMERGENCY)
Facility: HOSPITAL | Age: 65
Discharge: HOME | End: 2025-03-29
Attending: EMERGENCY MEDICINE
Payer: COMMERCIAL

## 2025-03-29 VITALS
SYSTOLIC BLOOD PRESSURE: 164 MMHG | WEIGHT: 201.25 LBS | TEMPERATURE: 98 F | HEIGHT: 66 IN | OXYGEN SATURATION: 96 % | BODY MASS INDEX: 32.34 KG/M2 | DIASTOLIC BLOOD PRESSURE: 80 MMHG | HEART RATE: 84 BPM | RESPIRATION RATE: 16 BRPM

## 2025-03-29 DIAGNOSIS — R10.9 ABDOMINAL PAIN: Primary | ICD-10-CM

## 2025-03-29 LAB
ABSOLUTE EOSINOPHIL (OHS): 0.05 K/UL
ABSOLUTE MONOCYTE (OHS): 0.54 K/UL (ref 0.3–1)
ABSOLUTE NEUTROPHIL COUNT (OHS): 3.17 K/UL (ref 1.8–7.7)
ALBUMIN SERPL BCP-MCNC: 4 G/DL (ref 3.5–5.2)
ALLENS TEST: NORMAL
ALP SERPL-CCNC: 55 UNIT/L (ref 40–150)
ALT SERPL W/O P-5'-P-CCNC: 20 UNIT/L (ref 10–44)
ANION GAP (OHS): 8 MMOL/L (ref 8–16)
AST SERPL-CCNC: 22 UNIT/L (ref 11–45)
BASOPHILS # BLD AUTO: 0.03 K/UL
BASOPHILS NFR BLD AUTO: 0.4 %
BILIRUB SERPL-MCNC: 0.3 MG/DL (ref 0.1–1)
BILIRUB UR QL STRIP.AUTO: NEGATIVE
BUN SERPL-MCNC: 17 MG/DL (ref 8–23)
BUN SERPL-MCNC: 19 MG/DL (ref 6–30)
CALCIUM SERPL-MCNC: 9 MG/DL (ref 8.7–10.5)
CHLORIDE SERPL-SCNC: 103 MMOL/L (ref 95–110)
CHLORIDE SERPL-SCNC: 104 MMOL/L (ref 95–110)
CLARITY UR: CLEAR
CO2 SERPL-SCNC: 25 MMOL/L (ref 23–29)
COLOR UR AUTO: YELLOW
CREAT SERPL-MCNC: 0.9 MG/DL (ref 0.5–1.4)
CREAT SERPL-MCNC: 1 MG/DL (ref 0.5–1.4)
DELSYS: NORMAL
ERYTHROCYTE [DISTWIDTH] IN BLOOD BY AUTOMATED COUNT: 16.6 % (ref 11.5–14.5)
GFR SERPLBLD CREATININE-BSD FMLA CKD-EPI: >60 ML/MIN/1.73/M2
GLUCOSE SERPL-MCNC: 143 MG/DL (ref 70–110)
GLUCOSE SERPL-MCNC: 146 MG/DL (ref 70–110)
GLUCOSE UR QL STRIP: NEGATIVE
HCT VFR BLD AUTO: 36 % (ref 37–48.5)
HCT VFR BLD CALC: 35 %PCV (ref 36–54)
HCV AB SERPL QL IA: NORMAL
HGB BLD-MCNC: 10.8 GM/DL (ref 12–16)
HGB UR QL STRIP: NEGATIVE
HIV 1+2 AB+HIV1 P24 AG SERPL QL IA: NORMAL
IMM GRANULOCYTES # BLD AUTO: 0.01 K/UL (ref 0–0.04)
IMM GRANULOCYTES NFR BLD AUTO: 0.1 % (ref 0–0.5)
KETONES UR QL STRIP: NEGATIVE
LDH SERPL L TO P-CCNC: 0.87 MMOL/L (ref 0.5–2.2)
LEUKOCYTE ESTERASE UR QL STRIP: NEGATIVE
LIPASE SERPL-CCNC: 17 U/L (ref 4–60)
LYMPHOCYTES # BLD AUTO: 3.58 K/UL (ref 1–4.8)
MCH RBC QN AUTO: 24.8 PG (ref 27–50)
MCHC RBC AUTO-ENTMCNC: 30 G/DL (ref 32–36)
MCV RBC AUTO: 83 FL (ref 82–98)
MODE: NORMAL
NITRITE UR QL STRIP: NEGATIVE
NUCLEATED RBC (/100WBC) (OHS): 0 /100 WBC
OHS QRS DURATION: 86 MS
OHS QTC CALCULATION: 442 MS
PH UR STRIP: 6 [PH]
PLATELET # BLD AUTO: 95 K/UL (ref 150–450)
PMV BLD AUTO: 10.9 FL (ref 9.2–12.9)
POC IONIZED CALCIUM: 1.15 MMOL/L (ref 1.06–1.42)
POC TCO2 (MEASURED): 25 MMOL/L (ref 23–27)
POTASSIUM BLD-SCNC: 4.3 MMOL/L (ref 3.5–5.1)
POTASSIUM SERPL-SCNC: 4.4 MMOL/L (ref 3.5–5.1)
PROT SERPL-MCNC: 7 GM/DL (ref 6–8.4)
PROT UR QL STRIP: NEGATIVE
RBC # BLD AUTO: 4.35 M/UL (ref 4–5.4)
RELATIVE EOSINOPHIL (OHS): 0.7 %
RELATIVE LYMPHOCYTE (OHS): 48.5 % (ref 18–48)
RELATIVE MONOCYTE (OHS): 7.3 % (ref 4–15)
RELATIVE NEUTROPHIL (OHS): 43 % (ref 38–73)
SAMPLE: ABNORMAL
SAMPLE: NORMAL
SITE: NORMAL
SODIUM BLD-SCNC: 138 MMOL/L (ref 136–145)
SODIUM SERPL-SCNC: 137 MMOL/L (ref 136–145)
SP GR UR STRIP: 1.01
UROBILINOGEN UR STRIP-ACNC: NEGATIVE EU/DL
WBC # BLD AUTO: 7.38 K/UL (ref 3.9–12.7)

## 2025-03-29 PROCEDURE — 99285 EMERGENCY DEPT VISIT HI MDM: CPT | Mod: 25

## 2025-03-29 PROCEDURE — 80053 COMPREHEN METABOLIC PANEL: CPT | Performed by: EMERGENCY MEDICINE

## 2025-03-29 PROCEDURE — 87389 HIV-1 AG W/HIV-1&-2 AB AG IA: CPT | Performed by: PHYSICIAN ASSISTANT

## 2025-03-29 PROCEDURE — 81003 URINALYSIS AUTO W/O SCOPE: CPT | Performed by: EMERGENCY MEDICINE

## 2025-03-29 PROCEDURE — 93010 ELECTROCARDIOGRAM REPORT: CPT | Mod: ,,, | Performed by: INTERNAL MEDICINE

## 2025-03-29 PROCEDURE — 96374 THER/PROPH/DIAG INJ IV PUSH: CPT

## 2025-03-29 PROCEDURE — 85025 COMPLETE CBC W/AUTO DIFF WBC: CPT | Performed by: EMERGENCY MEDICINE

## 2025-03-29 PROCEDURE — 80047 BASIC METABLC PNL IONIZED CA: CPT

## 2025-03-29 PROCEDURE — 86803 HEPATITIS C AB TEST: CPT | Performed by: PHYSICIAN ASSISTANT

## 2025-03-29 PROCEDURE — 93005 ELECTROCARDIOGRAM TRACING: CPT

## 2025-03-29 PROCEDURE — 63600175 PHARM REV CODE 636 W HCPCS

## 2025-03-29 PROCEDURE — 96376 TX/PRO/DX INJ SAME DRUG ADON: CPT

## 2025-03-29 PROCEDURE — 99900035 HC TECH TIME PER 15 MIN (STAT)

## 2025-03-29 PROCEDURE — 83605 ASSAY OF LACTIC ACID: CPT

## 2025-03-29 PROCEDURE — 83690 ASSAY OF LIPASE: CPT | Performed by: EMERGENCY MEDICINE

## 2025-03-29 PROCEDURE — 96375 TX/PRO/DX INJ NEW DRUG ADDON: CPT

## 2025-03-29 PROCEDURE — 82330 ASSAY OF CALCIUM: CPT | Mod: 59

## 2025-03-29 RX ORDER — KETOROLAC TROMETHAMINE 30 MG/ML
10 INJECTION, SOLUTION INTRAMUSCULAR; INTRAVENOUS
Status: COMPLETED | OUTPATIENT
Start: 2025-03-29 | End: 2025-03-29

## 2025-03-29 RX ORDER — ONDANSETRON HYDROCHLORIDE 2 MG/ML
4 INJECTION, SOLUTION INTRAVENOUS
Status: COMPLETED | OUTPATIENT
Start: 2025-03-29 | End: 2025-03-29

## 2025-03-29 RX ORDER — MORPHINE SULFATE 2 MG/ML
2 INJECTION, SOLUTION INTRAMUSCULAR; INTRAVENOUS
Refills: 0 | Status: COMPLETED | OUTPATIENT
Start: 2025-03-29 | End: 2025-03-29

## 2025-03-29 RX ORDER — MORPHINE SULFATE 4 MG/ML
4 INJECTION, SOLUTION INTRAMUSCULAR; INTRAVENOUS
Refills: 0 | Status: COMPLETED | OUTPATIENT
Start: 2025-03-29 | End: 2025-03-29

## 2025-03-29 RX ADMIN — MORPHINE SULFATE 4 MG: 4 INJECTION INTRAVENOUS at 07:03

## 2025-03-29 RX ADMIN — KETOROLAC TROMETHAMINE 10 MG: 30 INJECTION, SOLUTION INTRAMUSCULAR; INTRAVENOUS at 08:03

## 2025-03-29 RX ADMIN — ONDANSETRON 4 MG: 2 INJECTION INTRAMUSCULAR; INTRAVENOUS at 07:03

## 2025-03-29 RX ADMIN — MORPHINE SULFATE 2 MG: 2 INJECTION, SOLUTION INTRAMUSCULAR; INTRAVENOUS at 08:03

## 2025-03-29 NOTE — ED TRIAGE NOTES
Pt c/o lower abd pain that is constant with sharper, shooting pain intermittently that goes through to her back. Pain started around 0200 this morning. Pt endorses n/v as well as difficulty urinating. Pt denies CP, SOB, dizziness, weakness, numbness, and tingling. Mylanta and Zofran not helping.

## 2025-03-29 NOTE — ED PROVIDER NOTES
"Encounter Date: 3/29/2025       History     Chief Complaint   Patient presents with    Abdominal Pain     Pt has c/o abd pain "shooting through" to mid back beginning around 2 am. +N/V     HPI  64-year-old female with past medical history of COPD, anemia, HLD, HTN, hypothyroidism, CLL, vertigo, alcohol related pancreatitis, irritable bowel syndrome C diff infection, diverticulitis and DM2 who is presenting to the ED with CC of lower abdominal pain since 2AM this morning with associated nausea but without vomiting.  She denies any urinary frequency, dysuria.  No hematuria.  She denies any vaginal discharge or bleeding.  Review of patient's allergies indicates:   Allergen Reactions    Lortab [hydrocodone-acetaminophen] Itching    Promethazine Itching and Other (See Comments)    Albuterol      Other Reaction(s): CONFUSION     Past Medical History:   Diagnosis Date    Alcoholism     c/b alcohol withdrawl seizures 7/2017    Anemia     Aortic atherosclerosis 04/17/2024    C. difficile colitis     Cancer of breast 10/2020    s/p bilateral mastectomy for  T1b N0 stage IA breast cancer October 2020    CLL (chronic lymphocytic leukemia) 09/30/2022 6/22/22 - PB flow cytometry  Immunophenotyping of peripheral blood detects a distinct kappa light chain restricted monoclonal B-cell population  (calculated at 2.44x10 9 /L, from the most recent CBC showing a total WBC of  7.35 K/uL with 61% total lymphocytes)  with a CLL phenotype (coexpression of CD19, CD5, CD23 and dim CD20). CD22 (FITC), FMC-7 and CD38 are negative in this population.    Controlled type 2 diabetes mellitus without complication, without long-term current use of insulin 11/30/2021    COPD (chronic obstructive pulmonary disease)     Depression     Diverticulitis     Fatty liver     GERD (gastroesophageal reflux disease)     Hyperlipidemia     Hypertension     Pancreatitis     Peptic ulcer disease     Polysubstance abuse     Posterior reversible encephalopathy " syndrome     Sarcoidosis of lung     over 30 yrs ago    Seizures     last seizure 2 years ago    Suicide attempt      Past Surgical History:   Procedure Laterality Date    APPENDECTOMY      BILATERAL MASTECTOMY Bilateral 10/29/2020    Procedure: MASTECTOMY, BILATERAL;  Surgeon: Baylee Kevin MD;  Location: [facility] OR [address] [place] [address];  Service: General;  Laterality: Bilateral;    BREAST REVISION SURGERY Bilateral 2/11/2021    Procedure: BREAST REVISION SURGERY;  Surgeon: Scottie Johnson MD;  Location: [facility] OR [address] [place] [address];  Service: Plastics;  Laterality: Bilateral;    COLONOSCOPY N/A 7/28/2017    Procedure: COLONOSCOPY;  Surgeon: Aaron Alvarado MD;  Location: [facility];  Service: Endoscopy;  Laterality: N/A;    COLONOSCOPY, SCREENING, HIGH RISK PATIENT N/A 1/9/2025    Procedure: COLONOSCOPY, SCREENING, HIGH RISK PATIENT;  Surgeon: Aayush Valente MD;  [facility]  Service: Endoscopy;  Laterality: N/A;    ESOPHAGOGASTRODUODENOSCOPY  10/7/2016, 11/6/2014    2016 - gastritis, duodenitis, 2014 erosive gastritis    ESOPHAGOGASTRODUODENOSCOPY N/A 2/11/2020    Procedure: ESOPHAGOGASTRODUODENOSCOPY (EGD);  Surgeon: Fawn Garrido MD;  Location: [facility];  Service: Endoscopy;  Laterality: N/A;    ESOPHAGOGASTRODUODENOSCOPY N/A 4/19/2021    Procedure: EGD (ESOPHAGOGASTRODUODENOSCOPY);  Surgeon: Paramjit Martino MD;  Location: [facility];  Service: Endoscopy;  Laterality: N/A;    ESOPHAGOGASTRODUODENOSCOPY N/A 1/9/2025    Procedure: EGD (ESOPHAGOGASTRODUODENOSCOPY);  Surgeon: Aayush Valente MD;  Location: [facility] ([address] [place] [address]);  Service: Endoscopy;  Laterality: N/A;  referral dr santiago/ prep inst given in office/ pt requested sutabs/diabetic/eliquis  11/12 Precall performed. Pt needs rx called and instructions resent to mychart SS  11/13/24- Rx and instructions sent as requested. DBM  11/14 pt r/s, Ok to hold Eliqui    FLEXIBLE SIGMOIDOSCOPY  11/06/2014    colitis    HYSTERECTOMY      IMPLANTATION OF  PERMANENT SACRAL NERVE STIMULATOR N/A 7/12/2022    Procedure: INSERTION, NEUROSTIMULATOR, PERMANENT, SACRAL;  Surgeon: Juaquin Edwards MD;  Location: [facility] OR [address] [place] [address];  Service: Urology;  Laterality: N/A;  1hr    INJECTION FOR SENTINEL NODE IDENTIFICATION Right 10/29/2020    Procedure: INJECTION, FOR SENTINEL NODE IDENTIFICATION;  Surgeon: Baylee Kevin MD;  Location: [address];  Service: General;  Laterality: Right;    INJECTION OF JOINT Right 10/10/2019    Procedure: Injection, Joint RIGHT ILIOPSOAS BURSA/TENDON INJECTION AND RIGHT GLUTEAL TENDON INJECTION WITH STEROID AND LIDOCAINE;  Surgeon: Guillaume Rico MD;  Location: [facility];  Service: Pain Management;  Laterality: Right;  NEEDS CONSENT    INSERTION OF BREAST TISSUE EXPANDER Bilateral 10/29/2020    Procedure: INSERTION, TISSUE EXPANDER, BREAST;  Surgeon: Scottie Johnson MD;  Location: [address];  Service: Plastics;  Laterality: Bilateral;  Right breast: 1082 g  Left breast: 1076 g    LIPOSUCTION Bilateral 2/11/2021    Procedure: LIPOSUCTION;  Surgeon: Scottie Johnson MD;  Location: [address];  Service: Plastics;  Laterality: Bilateral;    mediastenoscopy      REPLACEMENT OF IMPLANT OF BREAST Bilateral 2/11/2021    Procedure: REPLACEMENT, IMPLANT, BREAST;  Surgeon: Scottie Johnson MD;  Location: [address];  Service: Plastics;  Laterality: Bilateral;    SENTINEL LYMPH NODE BIOPSY Right 10/29/2020    Procedure: BIOPSY, LYMPH NODE, SENTINEL;  Surgeon: Baylee Kevin MD;  Location: [address];  Service: General;  Laterality: Right;    TONSILLECTOMY N/A 1970    TUBAL LIGATION       Family History   Problem Relation Name Age of Onset    Heart attack Father      Diabetes Father      Hypertension Father      Diabetes Mother      Hypertension Mother      Breast cancer Maternal Aunt      Colon cancer Maternal Uncle      Breast cancer Daughter      Ovarian cancer Neg Hx      Cancer Neg Hx       Social  History[1]  Review of Systems  All other systems reviewed and were negative; see HPI also for additional ROS.    Physical Exam     Initial Vitals [03/29/25 0621]   BP Pulse Resp Temp SpO2   (!) 143/67 90 18 97.7 °F (36.5 °C) 95 %      MAP       --         Physical Exam  Physical Exam:  CONSTITUTIONAL: Well developed, well nourished, in no acute distress.  HENT: Normocephalic, atraumatic    EYES: Sclerae anicteric   NECK: Supple, no thyroid enlargement  CARDIOVASCULAR: Regular rate and rhythm without any murmurs, gallops, rubs.  RESPIRATORY: Speaking in full sentences. Breathing comfortably. Auscultation of the lungs revealed normal breath sounds b/l, no wheezing, no rales, no rhonchi.  ABDOMEN: Ttp lower abdomen.   NEUROLOGIC: Alert, interacting normally. No facial droop. Voice is clear. Speech is fluent.  MSK: Moving all four extremities.  SKIN: Warm and dry. No visible rash on exposed areas of skin.    Psych: Mood and affect normal.       ED Course   Procedures  Labs Reviewed   COMPREHENSIVE METABOLIC PANEL - Abnormal       Result Value    Sodium 137      Potassium 4.4      Chloride 104      CO2 25      Glucose 143 (*)     BUN 17      Creatinine 1.0      Calcium 9.0      Protein Total 7.0      Albumin 4.0      Bilirubin Total 0.3      ALP 55      AST 22      ALT 20      Anion Gap 8      eGFR >60     CBC WITH DIFFERENTIAL - Abnormal    WBC 7.38      RBC 4.35      HGB 10.8 (*)     HCT 36.0 (*)     MCV 83      MCH 24.8 (*)     MCHC 30.0 (*)     RDW 16.6 (*)     Platelet Count 95 (*)     MPV 10.9      Nucleated RBC 0      Neut % 43.0      Lymph % 48.5 (*)     Mono % 7.3      Eos % 0.7      Basophil % 0.4      Imm Grans % 0.1      Neut # 3.17      Lymph # 3.58      Mono # 0.54      Eos # 0.05      Baso # 0.03      Imm Grans # 0.01     ISTAT PROCEDURE - Abnormal    POC Glucose 146 (*)     POC BUN 19      POC Creatinine 0.9      POC Sodium 138      POC Potassium 4.3      POC Chloride 103      POC TCO2 (MEASURED) 25       POC Ionized Calcium 1.15      POC Hematocrit 35 (*)     Sample CIARRA     LIPASE - Normal    Lipase Level 17     URINALYSIS, REFLEX TO URINE CULTURE - Normal    Color, UA Yellow      Appearance, UA Clear      pH, UA 6.0      Spec Grav UA 1.015      Protein, UA Negative      Glucose, UA Negative      Ketones, UA Negative      Bilirubin, UA Negative      Blood, UA Negative      Nitrites, UA Negative      Urobilinogen, UA Negative      Leukocyte Esterase, UA Negative     HEPATITIS C ANTIBODY - Normal    Hep C Ab Interp Non-Reactive     HIV 1 / 2 ANTIBODY - Normal    HIV 1/2 Ag/Ab Non-Reactive     CBC W/ AUTO DIFFERENTIAL    Narrative:     The following orders were created for panel order CBC W/ AUTO DIFFERENTIAL.  Procedure                               Abnormality         Status                     ---------                               -----------         ------                     CBC with Differential[[id number]]       Abnormal            Final result                 Please view results for these tests on the individual orders.   ISTAT LACTATE    POC Lactate 0.87      Sample VENOUS      Site Other      Allens Test N/A      DelSys Room Air      Mode SPONT       EKG Readings: (Independently Interpreted)   Initial Reading: No STEMI. Previous EKG: Compared with most recent EKG Rhythm: Normal Sinus Rhythm. Heart Rate: 79. ST Segments: Normal ST Segments. T Waves: Normal.     ECG Results              EKG 12-lead (Final result)        Collection Time Result Time QRS Duration OHS QTC Calculation    03/29/25 07:25:50 03/29/25 10:21:26 86 442                     Final result by Interface, Lab In [facility] (03/29/25 10:21:35)                   Narrative:    Test Reason : R10.9,    Vent. Rate :  79 BPM     Atrial Rate :  79 BPM     P-R Int : 148 ms          QRS Dur :  86 ms      QT Int : 386 ms       P-R-T Axes :  77  53  37 degrees    QTcB Int : 442 ms    Normal sinus rhythm  Normal ECG  When compared with ECG of 13-Feb-2025  17:54,  No significant change was found  Confirmed by Sg Mejia ([id number]) on 3/29/2025 10:21:23 AM    Referred By: AAAREFERRAL SELF           Confirmed By: Sg Mejia                                  Imaging Results              CT Abdomen Pelvis  Without Contrast (Final result)  Result time 03/29/25 09:04:51      Final result by Liborio Bridges Jr., MD (03/29/25 09:04:51)                   Impression:      No acute intra-abdominal process.    Stable pelvic adenopathy which may be reactive in this patient with history of sarcoidosis or related to lymphoproliferative disease or metastatic disease.    Cirrhotic liver morphology    Splenomegaly      Electronically signed by: Liborio Magallanes Jr  Date:    03/29/2025  Time:    09:04               Narrative:    EXAMINATION:  CT ABDOMEN PELVIS WITHOUT CONTRAST    CLINICAL HISTORY:  Abdominal pain, acute, nonlocalized;lower abdominal pain rlq and llq;    TECHNIQUE:  Low dose axial images, sagittal and coronal reformations were obtained from the lung bases to the pubic symphysis.  Contrast was not administered.    COMPARISON:  10/22/2024    FINDINGS:  Lingular pleuroparenchymal scarring and volume loss.    Kidneys/ Ureters: Ptotic and malrotated right kidney.  Otherwise kidneys are unremarkable.    Liver: Lobulated contour suggesting cirrhosis.    Gallbladder: No calcified gallstones.    Bile Ducts: No evidence of dilated ducts.    Pancreas: No mass or peripancreatic fat stranding.    Spleen: Enlarged    Adrenals: Unremarkable.    Pelvic organs: Hysterectomy changes.  Bladder unremarkable.    GI Tract/Mesentery: No evidence of bowel obstruction or inflammation.    Retroperitoneum: Shotty para-aortic lymph nodes are stable.  Bulky enlarged lymph nodes in the common iliac, external iliac, and to a lesser extent femoral екатерина chains are redemonstrated, the largest of which is in the left external iliac fossa measuring 3.4 cm in short axis.  Findings are similar to the prior  exam.    Vasculature: Aortic atherosclerosis is present.    Bones: Unremarkable.                                    X-Rays:   Independently Interpreted Readings:   Abdomen:   Abdomen and Pelvis CT with Contrast - No masses.  Normal vessels.  No acute changes.     Medications   ondansetron injection 4 mg (4 mg Intravenous Given 3/29/25 0758)   morphine injection 4 mg (4 mg Intravenous Given 3/29/25 0757)   morphine injection 2 mg (2 mg Intravenous Given 3/29/25 0842)   ketorolac injection 9.999 mg (9.999 mg Intravenous Given 3/29/25 0847)     Medical Decision Making  64-year-old female with history and physical exam as noted above.  Vital signs within normal limits.  She is afebrile.  In no acute distress.  She is presenting for lower abdominal pain radiating to back. Ddx includes UTI/ cystitis, pyelonephritis, diverticulitis, appendicitis, gastroenteritis, IBS, nephrolithiasis, pancreatitis.        CBC unremarkable without leukocytosis, significant anemia, or decreased platelets. CMP unremarkable without significant electrolyte derangement, impaired renal function, or elevated LFTs. Lipase normal. Acute pancreatitis unlikely. CT abd/pelvis w/o acute intra-abdominal process. Pt did have 1 loose BM in the ED. Pmhx of cdiff but concern for C diff at this time given this singular episode. Pt instructed to f/u with PCP v her GI doc to inquire about retarting librax.       Amount and/or Complexity of Data Reviewed  Labs: ordered. Decision-making details documented in ED Course.  Radiology: ordered.    Risk  Prescription drug management.              Attending Attestation:   Physician Attestation Statement for Resident:  As the supervising MD   Physician Attestation Statement: I have personally seen and examined this patient.   I agree with the above history.  -:   As the supervising MD I agree with the above PE.     As the supervising MD I agree with the above treatment, course, plan, and disposition.                    I  have reviewed and concur with the resident's history, physical, assessment, and plan.  I have personally interviewed and examined the patient at bedside.   I did supervise any and all procedures and was present for any critical portion, and was always immediately available for help and as a resource.     The above history physical, review of symptoms, HPI and physical exam reflect my independent interpretation and evaluation.    Complexity: Moderate High Risk    Final diagnoses:  [R10.9] Abdominal pain (Primary)     Jean Carlos Diez DO, FAAEM  Emergency Staff Physician   Dept of Emergency Medicine   [facility]  Spectralink: [id number]        Disclaimer: This note has been generated using voice-recognition software. There may be typographical errors that have been missed during proof-reading.            ED Course as of 25 1935   Sat Mar 29, 2025   1014 Urinalysis, Reflex to Urine Culture Urine, Clean Catch [MK]      ED Course User Index  [MK] Geneva Elise MD                           Clinical Impression:  Final diagnoses:  [R10.9] Abdominal pain (Primary)          ED Disposition Condition    Discharge           ED Prescriptions    None       Follow-up Information       Follow up With Specialties Details Why Contact Info    Andrew Rodriguez MD Internal Medicine Call  As needed, f/u diarrhea/abdominal pain. [address] [facility]  [address]  [place] [address]  [phone]                 Geneva Elise MD  Resident  25 [phone]         [1]   Social History  Tobacco Use    Smoking status: Every Day     Current packs/day: 0.00     Average packs/day: 0.5 packs/day for 30.0 years (15.0 ttl pk-yrs)     Types: Vaping with nicotine, Cigarettes     Start date: 1991     Last attempt to quit: 2021     Years since quittin.1    Smokeless tobacco: Never    Tobacco comments:     Patient is currently smoking 10 cigarettes a day, declines nicotine patches   Substance Use Topics    Alcohol use: Not  Currently     Comment: vodka daily (half a regular bottle) for 4 days    Drug use: Not Currently     Types: Marijuana     Comment: Jean Carlos Ace DO  03/29/25 1936

## 2025-04-02 ENCOUNTER — TELEPHONE (OUTPATIENT)
Dept: SMOKING CESSATION | Facility: CLINIC | Age: 65
End: 2025-04-02
Payer: COMMERCIAL

## 2025-04-02 NOTE — TELEPHONE ENCOUNTER
Smoking cessation - called patient for cancelled appointment and she did not wish to reschedule , she said *I decided not to do that * . CTTS advised patient if she changed her mind she should give us a call to reschedule at any time. [phone].

## 2025-04-15 ENCOUNTER — TELEPHONE (OUTPATIENT)
Dept: HEPATOLOGY | Facility: CLINIC | Age: 65
End: 2025-04-15

## 2025-04-15 ENCOUNTER — PROCEDURE VISIT (OUTPATIENT)
Dept: HEPATOLOGY | Facility: CLINIC | Age: 65
End: 2025-04-15
Payer: COMMERCIAL

## 2025-04-15 ENCOUNTER — OFFICE VISIT (OUTPATIENT)
Dept: HEPATOLOGY | Facility: CLINIC | Age: 65
End: 2025-04-15
Payer: COMMERCIAL

## 2025-04-15 VITALS
HEART RATE: 97 BPM | BODY MASS INDEX: 32.53 KG/M2 | WEIGHT: 202.38 LBS | HEIGHT: 66 IN | SYSTOLIC BLOOD PRESSURE: 139 MMHG | TEMPERATURE: 98 F | OXYGEN SATURATION: 95 % | DIASTOLIC BLOOD PRESSURE: 65 MMHG

## 2025-04-15 DIAGNOSIS — K74.60 HEPATIC CIRRHOSIS, UNSPECIFIED HEPATIC CIRRHOSIS TYPE, UNSPECIFIED WHETHER ASCITES PRESENT: ICD-10-CM

## 2025-04-15 DIAGNOSIS — K76.0 METABOLIC DYSFUNCTION-ASSOCIATED STEATOTIC LIVER DISEASE AND INCREASED ALCOHOL INTAKE (METALD): Primary | ICD-10-CM

## 2025-04-15 DIAGNOSIS — F10.90 METABOLIC DYSFUNCTION-ASSOCIATED STEATOTIC LIVER DISEASE AND INCREASED ALCOHOL INTAKE (METALD): Primary | ICD-10-CM

## 2025-04-15 DIAGNOSIS — K74.00 HEPATIC FIBROSIS: ICD-10-CM

## 2025-04-15 PROCEDURE — 3078F DIAST BP <80 MM HG: CPT | Mod: CPTII,S$GLB,, | Performed by: STUDENT IN AN ORGANIZED HEALTH CARE EDUCATION/TRAINING PROGRAM

## 2025-04-15 PROCEDURE — 99204 OFFICE O/P NEW MOD 45 MIN: CPT | Mod: S$GLB,,, | Performed by: STUDENT IN AN ORGANIZED HEALTH CARE EDUCATION/TRAINING PROGRAM

## 2025-04-15 PROCEDURE — 1101F PT FALLS ASSESS-DOCD LE1/YR: CPT | Mod: CPTII,S$GLB,, | Performed by: STUDENT IN AN ORGANIZED HEALTH CARE EDUCATION/TRAINING PROGRAM

## 2025-04-15 PROCEDURE — 1125F AMNT PAIN NOTED PAIN PRSNT: CPT | Mod: CPTII,S$GLB,, | Performed by: STUDENT IN AN ORGANIZED HEALTH CARE EDUCATION/TRAINING PROGRAM

## 2025-04-15 PROCEDURE — 99999 PR PBB SHADOW E&M-EST. PATIENT-LVL V: CPT | Mod: PBBFAC,,, | Performed by: STUDENT IN AN ORGANIZED HEALTH CARE EDUCATION/TRAINING PROGRAM

## 2025-04-15 PROCEDURE — 3288F FALL RISK ASSESSMENT DOCD: CPT | Mod: CPTII,S$GLB,, | Performed by: STUDENT IN AN ORGANIZED HEALTH CARE EDUCATION/TRAINING PROGRAM

## 2025-04-15 PROCEDURE — 1160F RVW MEDS BY RX/DR IN RCRD: CPT | Mod: CPTII,S$GLB,, | Performed by: STUDENT IN AN ORGANIZED HEALTH CARE EDUCATION/TRAINING PROGRAM

## 2025-04-15 PROCEDURE — 91200 LIVER ELASTOGRAPHY: CPT | Mod: S$GLB,,, | Performed by: STUDENT IN AN ORGANIZED HEALTH CARE EDUCATION/TRAINING PROGRAM

## 2025-04-15 PROCEDURE — 3075F SYST BP GE 130 - 139MM HG: CPT | Mod: CPTII,S$GLB,, | Performed by: STUDENT IN AN ORGANIZED HEALTH CARE EDUCATION/TRAINING PROGRAM

## 2025-04-15 PROCEDURE — 3008F BODY MASS INDEX DOCD: CPT | Mod: CPTII,S$GLB,, | Performed by: STUDENT IN AN ORGANIZED HEALTH CARE EDUCATION/TRAINING PROGRAM

## 2025-04-15 PROCEDURE — 1159F MED LIST DOCD IN RCRD: CPT | Mod: CPTII,S$GLB,, | Performed by: STUDENT IN AN ORGANIZED HEALTH CARE EDUCATION/TRAINING PROGRAM

## 2025-04-15 NOTE — PROGRESS NOTES
Hepatology Consult Note    Referring provider: Dr. Nima Johnson  PCP: Andrew Rodriguez MD    Chief complaint:  Possible cirrhosis    HPI:  Earl Abdul is a 64 y.o. female who was referred to Hepatology Clinic for possible cirrhosis.    She reports being told that she had a fatty liver approximately 15 years ago.  She underwent liver biopsy in December 2015 that showed steatohepatitis and stage 1 fibrosis.  She has had multiple imaging studies that have shown hepatic steatosis, and CT in February 2024 showed a nodular appearing liver concerning for cirrhosis. She reports intermittent bloating denies other signs of decompensated cirrhosis including no recent encephalopathy, jaundice, GI bleeding.    She reports history of heavy alcohol use drinking daily for 2-3 years.  Prior to that she drank socially, and she quit drinking completely approximately 3 months ago.  Testing negative for viral hepatitis.  Her mother had a fatty liver.  No other known family history of liver disease.  No etoh, prev daily etoh x2-3 years, quit 3 mo ago, social before that      Past Medical History:   Diagnosis Date    Alcoholism     c/b alcohol withdrawl seizures 7/2017    Anemia     Aortic atherosclerosis 04/17/2024    C. difficile colitis     Cancer of breast 10/2020    s/p bilateral mastectomy for  T1b N0 stage IA breast cancer October 2020    CLL (chronic lymphocytic leukemia) 09/30/2022 6/22/22 - PB flow cytometry  Immunophenotyping of peripheral blood detects a distinct kappa light chain restricted monoclonal B-cell population  (calculated at 2.44x10 9 /L, from the most recent CBC showing a total WBC of  7.35 K/uL with 61% total lymphocytes)  with a CLL phenotype (coexpression of CD19, CD5, CD23 and dim CD20). CD22 (FITC), FMC-7 and CD38 are negative in this population.    Controlled type 2 diabetes mellitus without complication, without long-term current use of insulin 11/30/2021    COPD (chronic obstructive pulmonary  disease)     Depression     Diverticulitis     Fatty liver     GERD (gastroesophageal reflux disease)     Hyperlipidemia     Hypertension     Pancreatitis     Peptic ulcer disease     Polysubstance abuse     Posterior reversible encephalopathy syndrome     Sarcoidosis of lung     over 30 yrs ago    Seizures     last seizure 2 years ago    Suicide attempt        Past Surgical History:   Procedure Laterality Date    APPENDECTOMY      BILATERAL MASTECTOMY Bilateral 10/29/2020    Procedure: MASTECTOMY, BILATERAL;  Surgeon: Baylee Kevin MD;  Location: [facility] [address] [place] [address];  Service: General;  Laterality: Bilateral;    BREAST REVISION SURGERY Bilateral 2/11/2021    Procedure: BREAST REVISION SURGERY;  Surgeon: Scottie Johnson MD;  Location: [facility] [address] [place] [address];  Service: Plastics;  Laterality: Bilateral;    COLONOSCOPY N/A 7/28/2017    Procedure: COLONOSCOPY;  Surgeon: Aaron Alvarado MD;  Location: [facility];  Service: Endoscopy;  Laterality: N/A;    COLONOSCOPY, SCREENING, HIGH RISK PATIENT N/A 1/9/2025    Procedure: COLONOSCOPY, SCREENING, HIGH RISK PATIENT;  Surgeon: Aayush Valente MD;  Location: [facility] ([address] [place] [address]);  Service: Endoscopy;  Laterality: N/A;    ESOPHAGOGASTRODUODENOSCOPY  10/7/2016, 11/6/2014    2016 - gastritis, duodenitis, 2014 erosive gastritis    ESOPHAGOGASTRODUODENOSCOPY N/A 2/11/2020    Procedure: ESOPHAGOGASTRODUODENOSCOPY (EGD);  Surgeon: Fawn Garrido MD;  Location: [facility];  Service: Endoscopy;  Laterality: N/A;    ESOPHAGOGASTRODUODENOSCOPY N/A 4/19/2021    Procedure: EGD (ESOPHAGOGASTRODUODENOSCOPY);  Surgeon: Paramjit Martino MD;  Location: [facility];  Service: Endoscopy;  Laterality: N/A;    ESOPHAGOGASTRODUODENOSCOPY N/A 1/9/2025    Procedure: EGD (ESOPHAGOGASTRODUODENOSCOPY);  Surgeon: Aayush Valente MD;  Location: [facility] ([address] [place] [address]);  Service: Endoscopy;  Laterality: N/A;  referral dr santiago/ prep inst given in office/ pt requested sutabs/diabetic/eliquis  11/12 Precall  performed. Pt needs rx called and instructions resent to [facility]  11/13/24- Rx and instructions sent as requested. DBM  11/14 pt r/s, Ok to hold Eliqui    FLEXIBLE SIGMOIDOSCOPY  11/06/2014    colitis    HYSTERECTOMY      IMPLANTATION OF PERMANENT SACRAL NERVE STIMULATOR N/A 7/12/2022    Procedure: INSERTION, NEUROSTIMULATOR, PERMANENT, SACRAL;  Surgeon: Juaquin Edwards MD;  Location: [address];  Service: Urology;  Laterality: N/A;  1hr    INJECTION FOR SENTINEL NODE IDENTIFICATION Right 10/29/2020    Procedure: INJECTION, FOR SENTINEL NODE IDENTIFICATION;  Surgeon: Baylee Kevin MD;  Location: [address];  Service: General;  Laterality: Right;    INJECTION OF JOINT Right 10/10/2019    Procedure: Injection, Joint RIGHT ILIOPSOAS BURSA/TENDON INJECTION AND RIGHT GLUTEAL TENDON INJECTION WITH STEROID AND LIDOCAINE;  Surgeon: Guillaume Rico MD;  Location: [facility];  Service: Pain Management;  Laterality: Right;  NEEDS CONSENT    INSERTION OF BREAST TISSUE EXPANDER Bilateral 10/29/2020    Procedure: INSERTION, TISSUE EXPANDER, BREAST;  Surgeon: Scottie Johnson MD;  Location: [address];  Service: Plastics;  Laterality: Bilateral;  Right breast: 1082 g  Left breast: 1076 g    LIPOSUCTION Bilateral 2/11/2021    Procedure: LIPOSUCTION;  Surgeon: Scottie Johnson MD;  Location: [address];  Service: Plastics;  Laterality: Bilateral;    mediastenoscopy      REPLACEMENT OF IMPLANT OF BREAST Bilateral 2/11/2021    Procedure: REPLACEMENT, IMPLANT, BREAST;  Surgeon: Scottie Johnson MD;  Location: [address];  Service: Plastics;  Laterality: Bilateral;    SENTINEL LYMPH NODE BIOPSY Right 10/29/2020    Procedure: BIOPSY, LYMPH NODE, SENTINEL;  Surgeon: Baylee Kevin MD;  Location: [address];  Service: General;  Laterality: Right;    TONSILLECTOMY N/A 1970    TUBAL LIGATION         Family History   Problem Relation Name Age of Onset    Heart attack Father       "Diabetes Father      Hypertension Father      Diabetes Mother      Hypertension Mother      Breast cancer Maternal Aunt      Colon cancer Maternal Uncle      Breast cancer Daughter      Ovarian cancer Neg Hx      Cancer Neg Hx         Social History[1]    Current Medications[2]    Review of patient's allergies indicates:   Allergen Reactions    Lortab [hydrocodone-acetaminophen] Itching    Promethazine Itching and Other (See Comments)    Albuterol      Other Reaction(s): CONFUSION       Review of Systems   Constitutional:  Negative for fever and weight loss.   Cardiovascular:  Negative for leg swelling.   Gastrointestinal:  Positive for nausea. Negative for abdominal pain, blood in stool, constipation, diarrhea, heartburn, melena and vomiting.       Vitals:    04/15/25 0853   BP: 139/65   Pulse: 97   Temp: 98 °F (36.7 °C)   TempSrc: Oral   SpO2: 95%   Weight: 91.8 kg (202 lb 6.1 oz)   Height: 5' 6" (1.676 m)       Physical Exam  Constitutional:       General: She is not in acute distress.  Eyes:      General: No scleral icterus.  Cardiovascular:      Rate and Rhythm: Normal rate and regular rhythm.   Pulmonary:      Effort: Pulmonary effort is normal. No respiratory distress.   Abdominal:      General: Bowel sounds are normal. There is no distension.      Palpations: Abdomen is soft.      Tenderness: There is no abdominal tenderness. There is no guarding or rebound.   Musculoskeletal:      Right lower leg: No edema.      Left lower leg: No edema.   Skin:     Coloration: Skin is not jaundiced.         LABS: I personally reviewed pertinent laboratory findings.    Lab Results   Component Value Date    WBC 7.38 03/29/2025    HGB 10.8 (L) 03/29/2025    HCT 35 (L) 03/29/2025    MCV 83 03/29/2025    PLT 95 (L) 03/29/2025       Lab Results   Component Value Date     03/29/2025    K 4.4 03/29/2025     03/29/2025    CO2 25 03/29/2025    BUN 17 03/29/2025    CREATININE 1.0 03/29/2025    CALCIUM 9.0 03/29/2025    " ANIONGAP 8 03/29/2025    ESTGFRAFRICA >60.0 06/22/2022    EGFRNONAA >60.0 06/22/2022       Lab Results   Component Value Date    ALT 20 03/29/2025    AST 22 03/29/2025     (H) 04/01/2011    ALKPHOS 55 03/29/2025    BILITOT 0.3 03/29/2025       Lab Results   Component Value Date    HEPAIGM Negative 07/26/2017    HEPBIGM Positive (A) 07/26/2017    HEPCAB Non-Reactive 03/29/2025       Lab Results   Component Value Date    ROYCE Negative 02/11/2009    SMOOTHMUSCAB Negative 1:40 02/12/2025           IMAGING: I personally reviewed imaging studies.      Assessment:  64 y.o. female who was referred to Hepatology Clinic for possible cirrhosis.    She reports being told that she had a fatty liver approximately 15 years ago.  She underwent liver biopsy in December 2015 that showed steatohepatitis and stage 1 fibrosis.  She has had multiple imaging studies that have shown hepatic steatosis, and CT in February 2024 showed a nodular appearing liver concerning for cirrhosis.  LFTs in March 2025 within normal limits.  FibroScan today shows S3 steatosis and F2 fibrosis.  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    1. Metabolic dysfunction-associated steatotic liver disease and increased alcohol intake (MetALD)    2. Hepatic fibrosis        Recommendations:  - Congratulated on alcohol cessation and counseled on continued abstinence. Last drink 01/2025. PETH negative in 02/2025.  - Counseled on diet, weight loss, and control of co-morbidities. Encouraged to lose 10% of her body weight over the next 12 months.    Return to clinic in 12 months with labs and FibroScan.    I have sent communication to the referring physician and/or primary care provider.    I spent a total of 45 minutes on the day of the visit. This includes face to face time and non-face to face time preparing to see the patient (eg, review of tests), obtaining and/or reviewing separately obtained history, documenting clinical information in the electronic or other  health record, independently interpreting results, and communicating results to the patient/family/caregiver, or care coordination.    This note includes dictation done using M*Modal speech recognition program. Word recognition mistakes are occasionally missed on review.    Manas Frederick MD  Staff Physician  Hepatology and Liver Transplant  [facility]  [facility] [place]         [1]   Social History  Tobacco Use    Smoking status: Every Day     Current packs/day: 0.00     Average packs/day: 0.5 packs/day for 30.0 years (15.0 ttl pk-yrs)     Types: Vaping with nicotine, Cigarettes     Start date: 1991     Last attempt to quit: 2021     Years since quittin.2    Smokeless tobacco: Never    Tobacco comments:     Patient is currently smoking 10 cigarettes a day, declines nicotine patches   Substance Use Topics    Alcohol use: Not Currently     Comment: vodka daily (half a regular bottle) for 4 days    Drug use: Not Currently     Types: Marijuana     Comment: gummies   [2]   Current Outpatient Medications   Medication Sig Dispense Refill    amitriptyline (ELAVIL) 25 MG tablet Take 25-50 mg by mouth every evening.      apixaban (ELIQUIS) 5 mg Tab Take 1 tablet by mouth 2 (two) times daily.      atogepant (QULIPTA) 60 mg Tab Take 60 mg by mouth once daily.      clonazePAM (KLONOPIN) 0.5 MG tablet Take 0.25 mg by mouth every evening.      cyanocobalamin (VITAMIN B-12) 1000 MCG tablet Take 100 mcg by mouth once daily. Once every morning.      diclofenac sodium (VOLTAREN) 1 % Gel Apply 2 g topically 4 (four) times daily. 100 g 11    donepeziL (ARICEPT) 5 MG tablet Take 1 tablet by mouth every evening.      ergocalciferol (ERGOCALCIFEROL) 50,000 unit Cap Take 1 capsule by mouth every 7 days.      EScitalopram oxalate (LEXAPRO) 10 MG tablet Take 10 mg by mouth once daily.      fluticasone furoate (ARNUITY ELLIPTA) 100 mcg/actuation inhaler Inhale 1 puff into the lungs  once daily. Rinse mouth after each use. 30 each 11    folic acid (FOLVITE) 1 MG tablet Take 1 tablet (1 mg total) by mouth once daily. (Patient not taking: Reported on 2/12/2025) 30 tablet 0    gabapentin (NEURONTIN) 300 MG capsule Take 1 capsule (300 mg total) by mouth 3 (three) times daily. 90 capsule 0    hydrOXYzine pamoate (VISTARIL) 50 MG Cap Take 50 mg by mouth daily as needed.      lancets Misc Use to check blood glucose 4 times daily 100 each 5    levothyroxine (SYNTHROID) 75 MCG tablet Take 1 tablet (75 mcg total) by mouth before breakfast. 90 tablet 1    magnesium oxide (MAG-OX) 400 mg (241.3 mg magnesium) tablet TAKE 1 TABLET BY MOUTH EVERY MORNING 90 tablet 0    metFORMIN (GLUCOPHAGE-XR) 500 MG ER 24hr tablet Take 1 tablet (500 mg total) by mouth once daily. 360 tablet 3    modafiniL (PROVIGIL) 100 MG Tab Take 100 mg by mouth every morning.      multivitamin Tab Take 1 tablet by mouth once daily. 30 tablet 0    naltrexone (DEPADE) 50 mg tablet Take 50 mg by mouth once daily.      naproxen (NAPROSYN) 500 MG tablet Take 1 tablet (500 mg total) by mouth 2 (two) times a day. 30 tablet 0    omega-3 fatty acids 1,000 mg Cap Take 1 capsule by mouth once daily.      omega-3 fatty acids/fish oil (FISH OIL-OMEGA-3 FATTY ACIDS) 300-1,000 mg capsule Take 2 capsules by mouth once daily. Take mornings and nights.      omeprazole (PRILOSEC) 20 MG capsule Take 1 capsule (20 mg total) by mouth before breakfast. 90 capsule 2    ondansetron (ZOFRAN) 8 MG tablet Take 1 tablet (8 mg total) by mouth every 8 (eight) hours as needed for Nausea. 90 tablet 0    ondansetron (ZOFRAN-ODT) 4 MG TbDL Take 8 mg by mouth every 8 (eight) hours as needed.      propranoloL (INDERAL) 20 MG tablet Take 20 mg by mouth 2 (two) times daily. (Patient not taking: Reported on 3/26/2025)      QUEtiapine (SEROQUEL) 50 MG tablet Take 50 mg by mouth every evening. (Patient not taking: Reported on 3/26/2025)      rimegepant (NURTEC) 75 mg odt Take 1  tablet (75 mg total) by mouth every other day. Place ODT tablet on the tongue; alternatively the ODT tablet may be placed under the tongue 15 tablet 1    risperiDONE (RISPERDAL) 0.25 MG Tab Take 0.25 mg by mouth 2 (two) times daily.      scopolamine (TRANSDERM-SCOP) 1.3-1.5 mg (1 mg over 3 days) Place 1 patch onto the skin every 72 hours. 7 patch 2    tiotropium bromide (SPIRIVA RESPIMAT) 2.5 mcg/actuation inhaler Inhale 2 puffs into the lungs Daily. Controller 4 g 11    traZODone (DESYREL) 100 MG tablet Take 2 tablets (200 mg total) by mouth every evening. 60 tablet 0    TURMERIC ORAL Take 250 mg by mouth every morning. TurmericXL      vortioxetine (TRINTELLIX) 5 mg Tab Take 5 mg by mouth every morning.       No current facility-administered medications for this visit.     Facility-Administered Medications Ordered in Other Visits   Medication Dose Route Frequency Provider Last Rate Last Admin    albuterol sulfate nebulizer solution 2.5 mg  2.5 mg Nebulization Once Andrew Rodriguez MD

## 2025-04-15 NOTE — TELEPHONE ENCOUNTER
Recall placed    Allie Pettit MA    ----- Message from Manas Frederick MD sent at 4/15/2025 10:14 AM CDT -----  Return 1 year with labs and FibroScan

## 2025-04-15 NOTE — PROCEDURES
FibroScan [place] (Vibration Controlled Transient Elastography)    Date/Time: 4/15/2025 9:00 AM    Performed by: Manas Frederick MD  Authorized by: Nima Johnson MD    Diagnosis:  Alcohol and NAFLD    Probe:  XL    Universal Protocol: Patient's identity, procedure and site were verified, confirmatory pause was performed.  Discussed procedure including risks and potential complications.  Questions answered.  Patient verbalizes understanding and wishes to proceed with VCTE.     Procedure: After providing explanations of the procedure, patient was placed in the supine position with right arm in maximum abduction to allow optimal exposure of right lateral abdomen.  Patient was briefly assessed, Testing was performed in the mid-axillary location, 50Hz Shear Wave pulses were applied and the resulting Shear Wave and Propagation Speed detected with a 3.5 MHz ultrasonic signal, using the FibroScan probe, Skin to liver capsule distance and liver parenchyma were accessed during the entire examination with the FibroScan probe, Patient was instructed to breathe normally and to abstain from sudden movements during the procedure, allowing for random measurements of liver stiffness. At least 10 Shear Waves were produced, Individual measurements of each Shear Wave were calculated.  Patient tolerated the procedure well with no complications.  Meets discharge criteria as was dismissed.  Rates pain 0 out of 10.  Patient will follow up with ordering provider to review results.    Findings  Median liver stiffness score:  9.6  CAP Reading: dB/m:  319    IQR/med %:  9  Interpretation  Fibrosis interpretation is based on medial liver stiffness - Kilopascal (kPa).    Fibrosis Stage:  F2  Steatosis interpretation is based on controlled attenuation parameter - (dB/m).    Steatosis Grade:  S3

## 2025-04-23 ENCOUNTER — OFFICE VISIT (OUTPATIENT)
Dept: RHEUMATOLOGY | Facility: CLINIC | Age: 65
End: 2025-04-23
Payer: COMMERCIAL

## 2025-04-23 VITALS
HEIGHT: 66 IN | DIASTOLIC BLOOD PRESSURE: 81 MMHG | OXYGEN SATURATION: 94 % | WEIGHT: 203.94 LBS | BODY MASS INDEX: 32.78 KG/M2 | HEART RATE: 95 BPM | TEMPERATURE: 99 F | SYSTOLIC BLOOD PRESSURE: 150 MMHG

## 2025-04-23 DIAGNOSIS — Z71.89 COUNSELING AND COORDINATION OF CARE: ICD-10-CM

## 2025-04-23 DIAGNOSIS — D86.9 SARCOIDOSIS: Primary | ICD-10-CM

## 2025-04-23 DIAGNOSIS — M25.50 ARTHRALGIA, UNSPECIFIED JOINT: ICD-10-CM

## 2025-04-23 PROCEDURE — 99999 PR PBB SHADOW E&M-EST. PATIENT-LVL V: CPT | Mod: PBBFAC,,, | Performed by: STUDENT IN AN ORGANIZED HEALTH CARE EDUCATION/TRAINING PROGRAM

## 2025-04-23 PROCEDURE — 1101F PT FALLS ASSESS-DOCD LE1/YR: CPT | Mod: CPTII,S$GLB,, | Performed by: STUDENT IN AN ORGANIZED HEALTH CARE EDUCATION/TRAINING PROGRAM

## 2025-04-23 PROCEDURE — 99215 OFFICE O/P EST HI 40 MIN: CPT | Mod: S$GLB,,, | Performed by: STUDENT IN AN ORGANIZED HEALTH CARE EDUCATION/TRAINING PROGRAM

## 2025-04-23 PROCEDURE — G2211 COMPLEX E/M VISIT ADD ON: HCPCS | Mod: S$GLB,,, | Performed by: STUDENT IN AN ORGANIZED HEALTH CARE EDUCATION/TRAINING PROGRAM

## 2025-04-23 PROCEDURE — 1125F AMNT PAIN NOTED PAIN PRSNT: CPT | Mod: CPTII,S$GLB,, | Performed by: STUDENT IN AN ORGANIZED HEALTH CARE EDUCATION/TRAINING PROGRAM

## 2025-04-23 PROCEDURE — 3079F DIAST BP 80-89 MM HG: CPT | Mod: CPTII,S$GLB,, | Performed by: STUDENT IN AN ORGANIZED HEALTH CARE EDUCATION/TRAINING PROGRAM

## 2025-04-23 PROCEDURE — 3288F FALL RISK ASSESSMENT DOCD: CPT | Mod: CPTII,S$GLB,, | Performed by: STUDENT IN AN ORGANIZED HEALTH CARE EDUCATION/TRAINING PROGRAM

## 2025-04-23 PROCEDURE — 3008F BODY MASS INDEX DOCD: CPT | Mod: CPTII,S$GLB,, | Performed by: STUDENT IN AN ORGANIZED HEALTH CARE EDUCATION/TRAINING PROGRAM

## 2025-04-23 PROCEDURE — 3077F SYST BP >= 140 MM HG: CPT | Mod: CPTII,S$GLB,, | Performed by: STUDENT IN AN ORGANIZED HEALTH CARE EDUCATION/TRAINING PROGRAM

## 2025-04-23 NOTE — Clinical Note
Hi,  About this patient in common- she has sarcoidosis but her labs have mix results her IL-2 R is elevated but ACE is normal-she is having joint issues, fatigue and SOB, was going to treat but wondering if IL-2 R can also be elevated by her CLL history or if safe to treat with immunosuppression due to her history. Let me know if you have any thoughts. Thank you!

## 2025-04-23 NOTE — PROGRESS NOTES
Answers submitted by the patient for this visit:  Rheumatology Questionnaire (Submitted on 12/12/2024)  fever: No  eye redness: No  mouth sores: No  headaches: Yes  shortness of breath: No  chest pain: No  trouble swallowing: No  diarrhea: No  constipation: No     RHEUMATOLOGY OUTPATIENT CLINIC NOTE    5/8/2025    Attending Rheumatologist: Josh Connelly  Primary Care Provider: Andrew Rodriguez MD   Physician Requesting Consultation: No referring provider defined for this encounter.  Chief Complaint/Reason For Consultation:  Follow-up      Subjective:       HPI  Earl Abdul is a 64 y.o. White female with pmhx noted below referred for   Crohn's //CLL (once a year)  Sarcoidosis   No flare uo in yrs  4/23/2025:Patient here for follow up   She has had a significant mental decline   She also is having joint pains, fatigue, SOB        Review of Systems   Constitutional:  Negative for fever and unexpected weight change.   HENT:  Negative for mouth sores and trouble swallowing.    Eyes:  Negative for redness.   Respiratory:  Negative for cough and shortness of breath.    Cardiovascular:  Negative for chest pain.   Gastrointestinal:  Negative for constipation and diarrhea.   Genitourinary:  Negative for dysuria and genital sores.   Integumentary:  Negative for rash.   Neurological:  Positive for headaches.   Hematological:  Does not bruise/bleed easily.        Chronic comorbid conditions affecting medical decision making today:  Past Medical History:   Diagnosis Date    Alcoholism     c/b alcohol withdrawl seizures 7/2017    Anemia     Aortic atherosclerosis 04/17/2024    C. difficile colitis     Cancer of breast 10/2020    s/p bilateral mastectomy for  T1b N0 stage IA breast cancer October 2020    CLL (chronic lymphocytic leukemia) 09/30/2022 6/22/22 - PB flow cytometry  Immunophenotyping of peripheral blood detects a distinct kappa light chain restricted monoclonal B-cell population  (calculated at  2.44x10 9 /L, from the most recent CBC showing a total WBC of  7.35 K/uL with 61% total lymphocytes)  with a CLL phenotype (coexpression of CD19, CD5, CD23 and dim CD20). CD22 (FITC), FMC-7 and CD38 are negative in this population.    Controlled type 2 diabetes mellitus without complication, without long-term current use of insulin 11/30/2021    COPD (chronic obstructive pulmonary disease)     Depression     Diverticulitis     Fatty liver     GERD (gastroesophageal reflux disease)     Hyperlipidemia     Hypertension     Pancreatitis     Peptic ulcer disease     Polysubstance abuse     Posterior reversible encephalopathy syndrome     Sarcoidosis of lung     over 30 yrs ago    Seizures     last seizure 2 years ago    Suicide attempt      Past Surgical History:   Procedure Laterality Date    APPENDECTOMY      BILATERAL MASTECTOMY Bilateral 10/29/2020    Procedure: MASTECTOMY, BILATERAL;  Surgeon: Baylee Kevin MD;  Location: [facility] OR [address] [place] [address];  Service: General;  Laterality: Bilateral;    BREAST REVISION SURGERY Bilateral 2/11/2021    Procedure: BREAST REVISION SURGERY;  Surgeon: Scottie Johnson MD;  Location: [facility] OR [address] [place] [address];  Service: Plastics;  Laterality: Bilateral;    COLONOSCOPY N/A 7/28/2017    Procedure: COLONOSCOPY;  Surgeon: Aaron Alvarado MD;  Location: [facility];  Service: Endoscopy;  Laterality: N/A;    COLONOSCOPY, SCREENING, HIGH RISK PATIENT N/A 1/9/2025    Procedure: COLONOSCOPY, SCREENING, HIGH RISK PATIENT;  Surgeon: Aayush Valente MD;  Location: [facility] ([address] [place] [address]);  Service: Endoscopy;  Laterality: N/A;    ESOPHAGOGASTRODUODENOSCOPY  10/7/2016, 11/6/2014    2016 - gastritis, duodenitis, 2014 erosive gastritis    ESOPHAGOGASTRODUODENOSCOPY N/A 2/11/2020    Procedure: ESOPHAGOGASTRODUODENOSCOPY (EGD);  Surgeon: Fawn Garrido MD;  Location: [facility];  Service: Endoscopy;  Laterality: N/A;    ESOPHAGOGASTRODUODENOSCOPY N/A 4/19/2021    Procedure: EGD (ESOPHAGOGASTRODUODENOSCOPY);   Surgeon: Paramjit Martino MD;  Location: [facility] ENDO;  Service: Endoscopy;  Laterality: N/A;    ESOPHAGOGASTRODUODENOSCOPY N/A 1/9/2025    Procedure: EGD (ESOPHAGOGASTRODUODENOSCOPY);  Surgeon: aAyush Valente MD;  Location: [facility] (2ND FLR);  Service: Endoscopy;  Laterality: N/A;  referral dr santiago/ prep inst given in office/ pt requested sutabs/diabetic/eliquis  11/12 Precall performed. Pt needs rx called and instructions resent to mychart SS  11/13/24- Rx and instructions sent as requested. DBM  11/14 pt r/s, Ok to hold Eliqui    FLEXIBLE SIGMOIDOSCOPY  11/06/2014    colitis    HYSTERECTOMY      IMPLANTATION OF PERMANENT SACRAL NERVE STIMULATOR N/A 7/12/2022    Procedure: INSERTION, NEUROSTIMULATOR, PERMANENT, SACRAL;  Surgeon: Juaquin Edwards MD;  Location: [facility] OR [address] [place] [address];  Service: Urology;  Laterality: N/A;  1hr    INJECTION FOR SENTINEL NODE IDENTIFICATION Right 10/29/2020    Procedure: INJECTION, FOR SENTINEL NODE IDENTIFICATION;  Surgeon: Baylee Kevin MD;  Location: [facility] OR [address] [place] [address];  Service: General;  Laterality: Right;    INJECTION OF JOINT Right 10/10/2019    Procedure: Injection, Joint RIGHT ILIOPSOAS BURSA/TENDON INJECTION AND RIGHT GLUTEAL TENDON INJECTION WITH STEROID AND LIDOCAINE;  Surgeon: Guillaume Rico MD;  Location: [facility];  Service: Pain Management;  Laterality: Right;  NEEDS CONSENT    INSERTION OF BREAST TISSUE EXPANDER Bilateral 10/29/2020    Procedure: INSERTION, TISSUE EXPANDER, BREAST;  Surgeon: Scottie Johnson MD;  Location: [address];  Service: Plastics;  Laterality: Bilateral;  Right breast: 1082 g  Left breast: 1076 g    LIPOSUCTION Bilateral 2/11/2021    Procedure: LIPOSUCTION;  Surgeon: Scottie Johnson MD;  Location: [address];  Service: Plastics;  Laterality: Bilateral;    mediastenoscopy      REPLACEMENT OF IMPLANT OF BREAST Bilateral 2/11/2021    Procedure: REPLACEMENT, IMPLANT, BREAST;  Surgeon: Scottie Johnson MD;   Location: [facility] OR 2ND FLR;  Service: Plastics;  Laterality: Bilateral;    SENTINEL LYMPH NODE BIOPSY Right 10/29/2020    Procedure: BIOPSY, LYMPH NODE, SENTINEL;  Surgeon: Baylee Kevin MD;  Location: [facility] OR 2ND FLR;  Service: General;  Laterality: Right;    TONSILLECTOMY N/A     TUBAL LIGATION       Family History   Problem Relation Name Age of Onset    Heart attack Father      Diabetes Father      Hypertension Father      Diabetes Mother      Hypertension Mother      Breast cancer Maternal Aunt      Colon cancer Maternal Uncle      Breast cancer Daughter      Ovarian cancer Neg Hx      Cancer Neg Hx       Social History     Substance and Sexual Activity   Alcohol Use Not Currently    Comment: vodka daily (half a regular bottle) for 4 days     Social History     Tobacco Use   Smoking Status Every Day    Current packs/day: 0.00    Average packs/day: 0.5 packs/day for 30.0 years (15.0 ttl pk-yrs)    Types: Vaping with nicotine, Cigarettes    Start date: 1991    Last attempt to quit: 2021    Years since quittin.2   Smokeless Tobacco Never   Tobacco Comments    Patient is currently smoking 10 cigarettes a day, declines nicotine patches     Social History     Substance and Sexual Activity   Drug Use Not Currently    Types: Marijuana    Comment: gummies       Current Outpatient Medications:     amitriptyline (ELAVIL) 25 MG tablet, Take 25-50 mg by mouth every evening., Disp: , Rfl:     apixaban (ELIQUIS) 5 mg Tab, Take 1 tablet by mouth 2 (two) times daily., Disp: , Rfl:     atogepant (QULIPTA) 60 mg Tab, Take 60 mg by mouth once daily. (Patient not taking: Reported on 2025), Disp: , Rfl:     cyanocobalamin (VITAMIN B-12) 1000 MCG tablet, Take 100 mcg by mouth once daily. Once every morning. (Patient not taking: Reported on 2025), Disp: , Rfl:     diclofenac sodium (VOLTAREN) 1 % Gel, Apply 2 g topically 4 (four) times daily. (Patient not taking: Reported on 2025), Disp: 100 g, Rfl: 11     ergocalciferol (ERGOCALCIFEROL) 50,000 unit Cap, Take 1 capsule by mouth every 7 days. (Patient not taking: Reported on 5/8/2025), Disp: , Rfl:     hydrOXYzine pamoate (VISTARIL) 50 MG Cap, Take 50 mg by mouth daily as needed., Disp: , Rfl:     lancets Misc, Use to check blood glucose 4 times daily (Patient not taking: Reported on 5/8/2025), Disp: 100 each, Rfl: 5    levothyroxine (SYNTHROID) 75 MCG tablet, Take 1 tablet (75 mcg total) by mouth before breakfast., Disp: 90 tablet, Rfl: 1    magnesium oxide (MAG-OX) 400 mg (241.3 mg magnesium) tablet, TAKE 1 TABLET BY MOUTH EVERY MORNING, Disp: 90 tablet, Rfl: 0    metFORMIN (GLUCOPHAGE-XR) 500 MG ER 24hr tablet, Take 1 tablet (500 mg total) by mouth once daily. (Patient not taking: Reported on 5/8/2025), Disp: 360 tablet, Rfl: 3    modafiniL (PROVIGIL) 100 MG Tab, Take 100 mg by mouth every morning., Disp: , Rfl:     multivitamin Tab, Take 1 tablet by mouth once daily., Disp: 30 tablet, Rfl: 0    naltrexone (DEPADE) 50 mg tablet, Take 50 mg by mouth once daily., Disp: , Rfl:     naproxen (NAPROSYN) 500 MG tablet, Take 1 tablet (500 mg total) by mouth 2 (two) times a day. (Patient not taking: Reported on 5/8/2025), Disp: 30 tablet, Rfl: 0    omega-3 fatty acids 1,000 mg Cap, Take 1 capsule by mouth once daily., Disp: , Rfl:     omega-3 fatty acids/fish oil (FISH OIL-OMEGA-3 FATTY ACIDS) 300-1,000 mg capsule, Take 2 capsules by mouth once daily. Take mornings and nights., Disp: , Rfl:     omeprazole (PRILOSEC) 20 MG capsule, Take 1 capsule (20 mg total) by mouth before breakfast., Disp: 90 capsule, Rfl: 2    ondansetron (ZOFRAN-ODT) 4 MG TbDL, Take 8 mg by mouth every 8 (eight) hours as needed., Disp: , Rfl:     rimegepant (NURTEC) 75 mg odt, Take 1 tablet (75 mg total) by mouth every other day. Place ODT tablet on the tongue; alternatively the ODT tablet may be placed under the tongue, Disp: 15 tablet, Rfl: 1    risperiDONE (RISPERDAL) 0.25 MG Tab, Take 0.25 mg by  mouth 2 (two) times daily., Disp: , Rfl:     scopolamine (TRANSDERM-SCOP) 1.3-1.5 mg (1 mg over 3 days), Place 1 patch onto the skin every 72 hours., Disp: 7 patch, Rfl: 2    folic acid (FOLVITE) 1 MG tablet, Take 1 tablet (1 mg total) by mouth once daily. (Patient not taking: Reported on 2/12/2025), Disp: 30 tablet, Rfl: 0    gabapentin (NEURONTIN) 300 MG capsule, Take 1 capsule (300 mg total) by mouth 3 (three) times daily., Disp: 90 capsule, Rfl: 0    traZODone (DESYREL) 300 MG tablet, Take 300 mg by mouth every evening., Disp: , Rfl:     TRINTELLIX 20 mg Tab, Take 1 tablet by mouth Daily., Disp: , Rfl:   No current facility-administered medications for this visit.    Facility-Administered Medications Ordered in Other Visits:     albuterol sulfate nebulizer solution 2.5 mg, 2.5 mg, Nebulization, Once, Andrew Rodriguez MD     Objective:         Vitals:    04/23/25 1315   BP: (!) 150/81   Pulse: 95   Temp: 98.5 °F (36.9 °C)     Physical Exam   Constitutional: She is oriented to person, place, and time.   HENT:   Head: Normocephalic and atraumatic.   Right Ear: External ear normal.   Left Ear: External ear normal.   Nose: Nose normal.   Mouth/Throat: Oropharynx is clear and moist.   Eyes: Pupils are equal, round, and reactive to light. Conjunctivae are normal.   Cardiovascular: Normal rate and regular rhythm.   Pulmonary/Chest: Effort normal and breath sounds normal.   Abdominal: Soft. Bowel sounds are normal.   Musculoskeletal:         General: Tenderness present.      Cervical back: Normal range of motion and neck supple.      Comments: Multiple tender/trigger points    Neurological: She is alert and oriented to person, place, and time.   Skin: No rash noted. No erythema.   Psychiatric: Mood and affect normal.       Reviewed old and all outside pertinent medical records available.    All lab results personally reviewed and interpreted by me.  Lab Results   Component Value Date    WBC 8.22 05/05/2025    HGB  "10.2 (L) 05/05/2025    HCT 34.2 (L) 05/05/2025    MCV 83 05/05/2025    MCH 24.6 (L) 05/05/2025    MCHC 29.8 (L) 05/05/2025    RDW 15.3 (H) 05/05/2025    PLT 94 (L) 05/05/2025    MPV 10.8 05/05/2025    PLTEST Decreased (A) 02/13/2025       Lab Results   Component Value Date     05/05/2025    K 4.3 05/05/2025     05/05/2025    CO2 28 05/05/2025     (H) 05/05/2025    BUN 10 05/05/2025    CALCIUM 8.3 (L) 05/05/2025    PROT 6.4 05/05/2025    ALBUMIN 3.7 05/05/2025    BILITOT 0.3 05/05/2025    AST 17 05/05/2025    ALKPHOS 51 05/05/2025    ALT 16 05/05/2025       Lab Results   Component Value Date    COLORU Yellow 03/29/2025    APPEARANCEUA Clear 03/29/2025    SPECGRAV 1.015 03/29/2025    PHUR 6.0 03/29/2025    PROTEINUA Negative 03/29/2025    KETONESU Trace (A) 10/22/2024    LEUKOCYTESUR Negative 03/29/2025    NITRITE Negative 03/29/2025    UROBILINOGEN Negative 03/29/2025       Lab Results   Component Value Date    CRP 5.8 05/05/2025       Lab Results   Component Value Date    SEDRATE 4 05/05/2025       Lab Results   Component Value Date    ROYCE Negative 02/11/2009    RF <13.0 12/18/2024    SEDRATE 4 05/05/2025       No components found for: "25OHVITDTOT", "86YHFOWE0", "42ETOSQE2", "METHODNOTE"    Lab Results   Component Value Date    URICACID 8.7 (H) 10/15/2014       No components found for: "TSPOTTB"    Lab Results   Component Value Date    ROYCE Negative 02/11/2009        Imaging:  All imaging reviewed and independently interpreted by me.         ASSESSMENT / PLAN:     Ealr Abdul is a 64 y.o. White female with:      1. Arthralgia, unspecified joint  - Ambulatory referral/consult to Rheumatology  - traMADoL (ULTRAM) 50 mg tablet; Take 1 tablet (50 mg total) by mouth every 4 (four) hours as needed for Pain.  Dispense: 28 tablet; Refill: 0  - Unclear if 2.2 to sarcoidosis- mix results with elevated IL-2, normal inflammatory markers, low ACE and low vitamin D normal calcitriol     2. Sarcoidosis " (Primary)  - Unclear if 2.2 to sarcoidosis- mix results with elevated IL-2, normal inflammatory markers, low ACE and low vitamin D normal calcitriol   - Could IL-2 be elevated due to CLL history   - Since having joint pain, fatigue and sob with elevated IL-2 I would treat for sarcoidosis but she also has a hx of CLL which I think could cloud the IL-2 elevation   - will reach o ut to DR. Perez    3. Counseling and coordination of care  - over 10 minutes spent regarding below topics:  - Immunization counseling done.  - Weight loss counseling done.  - Nutrition and exercise counseling.  - Limitation of alcohol consumption.  - Regular exercise:  Aerobic and resistance.  - Medication counseling provided.        Follow up in about 3 months (around 7/23/2025).    Method of contact with patient concerns: [facility]    Disclaimer:  This note is prepared using voice recognition software and as such is likely to have errors and has not been proof read. Please contact me for questions.     Time spent: 40 minutes in face to face discussion concerning diagnosis, prognosis, review of lab and test results, benefits of treatment as well as management of disease, counseling of patient and coordination of care between various health care providers.  Greater than half the time spent was used for coordination of care and counseling of patient.    Josh Connelly M.D.  Rheumatology Department   [facility]   unexpected weight change: No  genital sore: No  dysuria: No  During the last 3 days, have you had a skin rash?: No  Bruises or bleeds easily: No  cough: No    Today's visit is associated with current or anticipated ongoing medical care related to this patient's diagnosis of sarcoidosis, which is a chronic disease which will require regular follow up to manage symptoms and progression. The patient is to return to the office within a minimum of 3-6 months to review symptoms and function at that time. CPT  code

## 2025-04-24 ENCOUNTER — TELEPHONE (OUTPATIENT)
Dept: INTERNAL MEDICINE | Facility: CLINIC | Age: 65
End: 2025-04-24
Payer: COMMERCIAL

## 2025-04-24 NOTE — TELEPHONE ENCOUNTER
----- Message from Med Assistant De La Garza sent at 4/22/2025  2:55 PM CDT -----  Name of Who is Calling: MARTY SALDAÑA [[id number]]      What is the request in detail: pt is calling to see if the provider can call her in something for her RLS (Restless Leg Syndrome). The pharmacy is  [id number] - [place] - [address] [facility] [address] [facility] Phone: [phone]. Pt stated that she is in pain and wakes her up at night. Can the clinic reply by [facility]: No      What [facility] to [facility] Back [facility]  [phone]

## 2025-04-28 ENCOUNTER — OFFICE VISIT (OUTPATIENT)
Dept: SLEEP MEDICINE | Facility: CLINIC | Age: 65
End: 2025-04-28
Payer: COMMERCIAL

## 2025-04-28 VITALS
DIASTOLIC BLOOD PRESSURE: 82 MMHG | BODY MASS INDEX: 32.59 KG/M2 | HEIGHT: 66 IN | SYSTOLIC BLOOD PRESSURE: 132 MMHG | HEART RATE: 137 BPM | WEIGHT: 202.81 LBS

## 2025-04-28 DIAGNOSIS — G47.33 OSA (OBSTRUCTIVE SLEEP APNEA): ICD-10-CM

## 2025-04-28 DIAGNOSIS — I10 ESSENTIAL HYPERTENSION: Chronic | ICD-10-CM

## 2025-04-28 DIAGNOSIS — R06.83 SNORING: Primary | ICD-10-CM

## 2025-04-28 DIAGNOSIS — G47.19 OTHER HYPERSOMNIA: ICD-10-CM

## 2025-04-28 DIAGNOSIS — R06.02 SHORTNESS OF BREATH: ICD-10-CM

## 2025-04-28 DIAGNOSIS — F51.01 PRIMARY INSOMNIA: ICD-10-CM

## 2025-04-28 PROCEDURE — 1101F PT FALLS ASSESS-DOCD LE1/YR: CPT | Mod: CPTII,S$GLB,, | Performed by: NURSE PRACTITIONER

## 2025-04-28 PROCEDURE — 3075F SYST BP GE 130 - 139MM HG: CPT | Mod: CPTII,S$GLB,, | Performed by: NURSE PRACTITIONER

## 2025-04-28 PROCEDURE — 3079F DIAST BP 80-89 MM HG: CPT | Mod: CPTII,S$GLB,, | Performed by: NURSE PRACTITIONER

## 2025-04-28 PROCEDURE — 99999 PR PBB SHADOW E&M-EST. PATIENT-LVL IV: CPT | Mod: PBBFAC,,, | Performed by: NURSE PRACTITIONER

## 2025-04-28 PROCEDURE — 3288F FALL RISK ASSESSMENT DOCD: CPT | Mod: CPTII,S$GLB,, | Performed by: NURSE PRACTITIONER

## 2025-04-28 PROCEDURE — 3008F BODY MASS INDEX DOCD: CPT | Mod: CPTII,S$GLB,, | Performed by: NURSE PRACTITIONER

## 2025-04-28 PROCEDURE — 1159F MED LIST DOCD IN RCRD: CPT | Mod: CPTII,S$GLB,, | Performed by: NURSE PRACTITIONER

## 2025-04-28 PROCEDURE — 99204 OFFICE O/P NEW MOD 45 MIN: CPT | Mod: S$GLB,,, | Performed by: NURSE PRACTITIONER

## 2025-04-28 NOTE — PROGRESS NOTES
"  ESTABLISHED PATIENT VISIT    Earl Abdul  is a pleasant 64 y.o. female established with the [facility].    Here today for:  follow-up     Since last visit:   See assessment below      Past Medical History:   Diagnosis Date    Alcoholism     c/b alcohol withdrawl seizures 7/2017    Anemia     Aortic atherosclerosis 04/17/2024    C. difficile colitis     Cancer of breast 10/2020    s/p bilateral mastectomy for  T1b N0 stage IA breast cancer October 2020    CLL (chronic lymphocytic leukemia) 09/30/2022 6/22/22 - PB flow cytometry  Immunophenotyping of peripheral blood detects a distinct kappa light chain restricted monoclonal B-cell population  (calculated at 2.44x10 9 /L, from the most recent CBC showing a total WBC of  7.35 K/uL with 61% total lymphocytes)  with a CLL phenotype (coexpression of CD19, CD5, CD23 and dim CD20). CD22 (FITC), FMC-7 and CD38 are negative in this population.    Controlled type 2 diabetes mellitus without complication, without long-term current use of insulin 11/30/2021    COPD (chronic obstructive pulmonary disease)     Depression     Diverticulitis     Fatty liver     GERD (gastroesophageal reflux disease)     Hyperlipidemia     Hypertension     Pancreatitis     Peptic ulcer disease     Polysubstance abuse     Posterior reversible encephalopathy syndrome     Sarcoidosis of lung     over 30 yrs ago    Seizures     last seizure 2 years ago    Suicide attempt    Problem List[1]Current Medications[2]      Vitals:    04/28/25 1504   BP: 132/82   Pulse: (!) 137   Weight: 92 kg (202 lb 13.2 oz)   Height: 5' 6" (1.676 m)     Physical Exam:    GEN:   Well-appearing  Psych:  Appropriate affect, demonstrates insight  SKIN:  No rash on the face or bridge of the nose      LABS:   Lab Results   Component Value Date    CO2 25 03/29/2025         Echo Saline Bubble? No; Ultrasound enhancing contrast? No  Result Date: 3/17/2025    Left Ventricle: The left ventricle is normal in size. " Normal wall   thickness. There is concentric remodeling. Normal wall motion. There is   normal systolic function with a visually estimated ejection fraction of 55   - 60%. There is normal diastolic function.    Right Ventricle: The right ventricle is normal in size. Wall thickness   is normal. Systolic function is normal.    Pulmonic Valve: There is mild regurgitation.    Pulmonary Artery: The estimated pulmonary artery systolic pressure is   22 mmHg.    IVC/SVC: Normal venous pressure at 3 mmHg.           Lab Results   Component Value Date    FERRITIN 15 (L) 02/12/2025       RECORDS REVIEWED:      Sig PMH:  PROBLEM DESCRIPTION/ Sx on Presentation  STATUS PLAN     Hx  VINICIO   Presentation:     Reports brain fog and unrefreshing sleep.  Had a nondiagnostic PSG in 2020; overall AHI 1.8 but BUCKY was 21.3.    \Son has VINICIO.    Would like to repeat sleep study.    yes - snoring  no - gasping arousals/coughing  yes - witnessed apneas, pauses in breathing    no - night sweats    no - AM headaches    no - dry mouth/sore throat      new   -we discussed sleep testing to evaluate for VINICIO     -discussed possible treatments for VINICIO including CPAP therapy       Daytime symptoms       Does not feel refreshed when waking up in the morning.     ESS 10/24 on intake    new   -will reassess sleepiness after evaluation for VINICIO     Insomnia       yes - difficulty with sleep onset    yes - difficulty with sleep maintenance      SLEEP SCHEDULE   Duration    Wind- down    Envmnt    CBTi    Meds prior    Meds now Trazodone 300 mg   Bed Time 9-10PM   Lights out    Latency 20 min   Arousals 3-4x   Back to sleep 20 min   Avg TST 5   Wake time 6AM   Caffeine    Naps 0   Nocturia 1   Work                        new   -will reassess after evaluation for sleep-disordered breathing       Nocturia     x 1 per sleep period    new          RTC:  will arrange RTC depending on results of sleep testing         PLAN      -recommend sleep testing   -HST  ordered  -discussed trial therapy if VINICIO present and the patient is open to a trial of CPAP therapy  -discussed the etiology of obstructive sleep apnea as well as the potential ramifications of untreated sleep apnea, which could include daytime sleepiness, hypertension, heart disease and/or stroke. We discussed potential treatment options, which could include weight loss, body positioning, continuous positive airway pressure (CPAP), oral appliance, Inspire, or referral for surgical consideration.        Advised on plan of care. Answered all patient questions. Patient verbalized understanding and voiced agreement with plan of care.                       [1]   Patient Active Problem List  Diagnosis    Alcohol use disorder, severe, dependence    Alcohol withdrawal    Essential hypertension    Fibromyalgia    Cigarette nicotine dependence without complication    Cannabis abuse, in remission    Sarcoidosis    DEVANTE (acute kidney injury)    History of Clostridium difficile colitis    Diarrhea    Dehydration    Nausea    Pyelonephritis due to Escherichia coli    Hypophosphatemia    Hypomagnesemia    Hypotension due to hypovolemia    New onset seizure    Posterior reversible encephalopathy syndrome    Depression with anxiety    Erythema nodosum    History of sarcoidosis    Hyperlipidemia    Ramírez's syndrome    Degenerative joint disease (DJD) of lumbar spine    Decreased ROM of lumbar spine    Difficulty walking    Thrombocytopenia    Hypokalemia    VINICIO (obstructive sleep apnea)    At risk for lymphedema    Malignant neoplasm of central portion of right breast in female, estrogen receptor positive    COPD (chronic obstructive pulmonary disease)    Incontinence of feces    Low serum vitamin B12    Anemia    Urge incontinence of urine    Hypothyroidism    Type 2 diabetes mellitus without complication, without long-term current use of insulin    Obesity (BMI 30.0-34.9)    Fecal incontinence    Mixed incontinence    Pelvic  floor tension    COVID-19    Hypoxia    Shortness of breath    Starvation ketoacidosis    E coli bacteremia    Lymphocytosis    Migraine without aura and with status migrainosus, not intractable    OAB (overactive bladder)    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    Refeeding syndrome    CLL (chronic lymphocytic leukemia)    Migraine    Esophagitis    Leg weakness, bilateral    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    Depression    Palliative care encounter    Advance care planning    Goals of care, counseling/discussion    Hypernatremia    Metabolic acidosis    Suicide attempt by cutting of wrist    Hyperkalemia    Urinary retention    SIRS (systemic inflammatory response syndrome)    Rhabdomyolysis    Left forearm pain    Paroxysmal atrial fibrillation    Weakness of left upper extremity    Anemia, chronic disease    Subclinical hyperthyroidism    Leukocytosis    Moderate malnutrition    Aortic atherosclerosis    Dementia associated with alcoholism    Chronic hypoxemic respiratory failure    Oxygen dependent    Type 2 diabetes mellitus    Undernutrition    Idiopathic peripheral autonomic neuropathy    Moderate recurrent major depression    Pneumonia    Lung mass   [2]   Current Outpatient Medications:     amitriptyline (ELAVIL) 25 MG tablet, Take 25-50 mg by mouth every evening., Disp: , Rfl:     apixaban (ELIQUIS) 5 mg Tab, Take 1 tablet by mouth 2 (two) times daily., Disp: , Rfl:     atogepant (QULIPTA) 60 mg Tab, Take 60 mg by mouth once daily., Disp: , Rfl:     clonazePAM (KLONOPIN) 0.5 MG tablet, Take 0.25 mg by mouth every evening., Disp: , Rfl:     cyanocobalamin (VITAMIN B-12) 1000 MCG tablet, Take 100 mcg by mouth once daily. Once every morning., Disp: , Rfl:     diclofenac sodium (VOLTAREN) 1 % Gel, Apply 2 g topically 4 (four) times daily., Disp: 100 g, Rfl: 11    donepeziL (ARICEPT) 5 MG tablet, Take 1 tablet by mouth  every evening., Disp: , Rfl:     ergocalciferol (ERGOCALCIFEROL) 50,000 unit Cap, Take 1 capsule by mouth every 7 days., Disp: , Rfl:     EScitalopram oxalate (LEXAPRO) 10 MG tablet, Take 10 mg by mouth once daily., Disp: , Rfl:     fluticasone furoate (ARNUITY ELLIPTA) 100 mcg/actuation inhaler, Inhale 1 puff into the lungs once daily. Rinse mouth after each use., Disp: 30 each, Rfl: 11    folic acid (FOLVITE) 1 MG tablet, Take 1 tablet (1 mg total) by mouth once daily. (Patient not taking: Reported on 2/12/2025), Disp: 30 tablet, Rfl: 0    gabapentin (NEURONTIN) 300 MG capsule, Take 1 capsule (300 mg total) by mouth 3 (three) times daily., Disp: 90 capsule, Rfl: 0    hydrOXYzine pamoate (VISTARIL) 50 MG Cap, Take 50 mg by mouth daily as needed., Disp: , Rfl:     lancets Misc, Use to check blood glucose 4 times daily, Disp: 100 each, Rfl: 5    levothyroxine (SYNTHROID) 75 MCG tablet, Take 1 tablet (75 mcg total) by mouth before breakfast., Disp: 90 tablet, Rfl: 1    magnesium oxide (MAG-OX) 400 mg (241.3 mg magnesium) tablet, TAKE 1 TABLET BY MOUTH EVERY MORNING, Disp: 90 tablet, Rfl: 0    metFORMIN (GLUCOPHAGE-XR) 500 MG ER 24hr tablet, Take 1 tablet (500 mg total) by mouth once daily., Disp: 360 tablet, Rfl: 3    modafiniL (PROVIGIL) 100 MG Tab, Take 100 mg by mouth every morning., Disp: , Rfl:     multivitamin Tab, Take 1 tablet by mouth once daily., Disp: 30 tablet, Rfl: 0    naltrexone (DEPADE) 50 mg tablet, Take 50 mg by mouth once daily., Disp: , Rfl:     naproxen (NAPROSYN) 500 MG tablet, Take 1 tablet (500 mg total) by mouth 2 (two) times a day., Disp: 30 tablet, Rfl: 0    omega-3 fatty acids 1,000 mg Cap, Take 1 capsule by mouth once daily., Disp: , Rfl:     omega-3 fatty acids/fish oil (FISH OIL-OMEGA-3 FATTY ACIDS) 300-1,000 mg capsule, Take 2 capsules by mouth once daily. Take mornings and nights., Disp: , Rfl:     omeprazole (PRILOSEC) 20 MG capsule, Take 1 capsule (20 mg total) by mouth before  breakfast., Disp: 90 capsule, Rfl: 2    ondansetron (ZOFRAN-ODT) 4 MG TbDL, Take 8 mg by mouth every 8 (eight) hours as needed., Disp: , Rfl:     propranoloL (INDERAL) 20 MG tablet, Take 20 mg by mouth 2 (two) times daily., Disp: , Rfl:     QUEtiapine (SEROQUEL) 50 MG tablet, Take 50 mg by mouth every evening., Disp: , Rfl:     rimegepant (NURTEC) 75 mg odt, Take 1 tablet (75 mg total) by mouth every other day. Place ODT tablet on the tongue; alternatively the ODT tablet may be placed under the tongue, Disp: 15 tablet, Rfl: 1    risperiDONE (RISPERDAL) 0.25 MG Tab, Take 0.25 mg by mouth 2 (two) times daily., Disp: , Rfl:     scopolamine (TRANSDERM-SCOP) 1.3-1.5 mg (1 mg over 3 days), Place 1 patch onto the skin every 72 hours., Disp: 7 patch, Rfl: 2    tiotropium bromide (SPIRIVA RESPIMAT) 2.5 mcg/actuation inhaler, Inhale 2 puffs into the lungs Daily. Controller, Disp: 4 g, Rfl: 11    traZODone (DESYREL) 100 MG tablet, Take 2 tablets (200 mg total) by mouth every evening., Disp: 60 tablet, Rfl: 0    TURMERIC ORAL, Take 250 mg by mouth every morning. TurmericXL, Disp: , Rfl:     vortioxetine (TRINTELLIX) 5 mg Tab, Take 5 mg by mouth every morning., Disp: , Rfl:   No current facility-administered medications for this visit.    Facility-Administered Medications Ordered in Other Visits:     albuterol sulfate nebulizer solution 2.5 mg, 2.5 mg, Nebulization, Once, Andrew Rodriguez MD

## 2025-05-01 ENCOUNTER — TELEPHONE (OUTPATIENT)
Dept: SLEEP MEDICINE | Facility: OTHER | Age: 65
End: 2025-05-01
Payer: COMMERCIAL

## 2025-05-02 ENCOUNTER — TELEPHONE (OUTPATIENT)
Dept: INTERNAL MEDICINE | Facility: CLINIC | Age: 65
End: 2025-05-02
Payer: COMMERCIAL

## 2025-05-05 ENCOUNTER — PATIENT MESSAGE (OUTPATIENT)
Dept: RHEUMATOLOGY | Facility: CLINIC | Age: 65
End: 2025-05-05
Payer: COMMERCIAL

## 2025-05-05 ENCOUNTER — LAB VISIT (OUTPATIENT)
Dept: LAB | Facility: HOSPITAL | Age: 65
End: 2025-05-05
Payer: COMMERCIAL

## 2025-05-05 ENCOUNTER — OFFICE VISIT (OUTPATIENT)
Dept: INTERNAL MEDICINE | Facility: CLINIC | Age: 65
End: 2025-05-05
Attending: INTERNAL MEDICINE
Payer: COMMERCIAL

## 2025-05-05 DIAGNOSIS — R59.0 LOCALIZED ENLARGED LYMPH NODES: ICD-10-CM

## 2025-05-05 DIAGNOSIS — C91.10 CLL (CHRONIC LYMPHOCYTIC LEUKEMIA): ICD-10-CM

## 2025-05-05 DIAGNOSIS — I10 ESSENTIAL HYPERTENSION: ICD-10-CM

## 2025-05-05 DIAGNOSIS — I48.91 ATRIAL FIBRILLATION WITH RVR: ICD-10-CM

## 2025-05-05 DIAGNOSIS — R42 DIZZINESS AND GIDDINESS: ICD-10-CM

## 2025-05-05 DIAGNOSIS — D86.9 SARCOID: ICD-10-CM

## 2025-05-05 DIAGNOSIS — D86.9 SARCOIDOSIS: ICD-10-CM

## 2025-05-05 DIAGNOSIS — E83.42 HYPOMAGNESEMIA: ICD-10-CM

## 2025-05-05 DIAGNOSIS — E03.9 HYPOTHYROIDISM, UNSPECIFIED TYPE: ICD-10-CM

## 2025-05-05 DIAGNOSIS — G25.81 RESTLESS LEG: Primary | ICD-10-CM

## 2025-05-05 DIAGNOSIS — F03.90 DEMENTIA, UNSPECIFIED DEMENTIA SEVERITY, UNSPECIFIED DEMENTIA TYPE, UNSPECIFIED WHETHER BEHAVIORAL, PSYCHOTIC, OR MOOD DISTURBANCE OR ANXIETY: ICD-10-CM

## 2025-05-05 DIAGNOSIS — F10.20 ALCOHOL USE DISORDER, SEVERE, DEPENDENCE: ICD-10-CM

## 2025-05-05 DIAGNOSIS — E61.1 IRON DEFICIENCY: ICD-10-CM

## 2025-05-05 DIAGNOSIS — G47.33 OSA (OBSTRUCTIVE SLEEP APNEA): ICD-10-CM

## 2025-05-05 DIAGNOSIS — R71.8 MICROCYTOSIS: ICD-10-CM

## 2025-05-05 LAB
ABSOLUTE EOSINOPHIL (OHS): 0.05 K/UL
ABSOLUTE MONOCYTE (OHS): 0.48 K/UL (ref 0.3–1)
ABSOLUTE NEUTROPHIL COUNT (OHS): 2.65 K/UL (ref 1.8–7.7)
ALBUMIN SERPL BCP-MCNC: 3.7 G/DL (ref 3.5–5.2)
ALP SERPL-CCNC: 51 UNIT/L (ref 40–150)
ALT SERPL W/O P-5'-P-CCNC: 16 UNIT/L (ref 10–44)
ANION GAP (OHS): 7 MMOL/L (ref 8–16)
AST SERPL-CCNC: 17 UNIT/L (ref 11–45)
BASOPHILS # BLD AUTO: 0.02 K/UL
BASOPHILS NFR BLD AUTO: 0.2 %
BILIRUB SERPL-MCNC: 0.3 MG/DL (ref 0.1–1)
BUN SERPL-MCNC: 10 MG/DL (ref 8–23)
CALCIUM SERPL-MCNC: 8.3 MG/DL (ref 8.7–10.5)
CHLORIDE SERPL-SCNC: 105 MMOL/L (ref 95–110)
CO2 SERPL-SCNC: 28 MMOL/L (ref 23–29)
CREAT SERPL-MCNC: 0.8 MG/DL (ref 0.5–1.4)
CRP SERPL-MCNC: 5.8 MG/L
ERYTHROCYTE [DISTWIDTH] IN BLOOD BY AUTOMATED COUNT: 15.3 % (ref 11.5–14.5)
ERYTHROCYTE [SEDIMENTATION RATE] IN BLOOD BY PHOTOMETRIC METHOD: 4 MM/HR
GFR SERPLBLD CREATININE-BSD FMLA CKD-EPI: >60 ML/MIN/1.73/M2
GLUCOSE SERPL-MCNC: 138 MG/DL (ref 70–110)
HBV CORE AB SERPL QL IA: REACTIVE
HBV SURFACE AB SER-ACNC: 202.06 MIU/ML
HBV SURFACE AB SERPL IA-ACNC: REACTIVE M[IU]/ML
HBV SURFACE AG SERPL QL IA: NORMAL
HCT VFR BLD AUTO: 34.2 % (ref 37–48.5)
HCV AB SERPL QL IA: NORMAL
HGB BLD-MCNC: 10.2 GM/DL (ref 12–16)
HIV 1+2 AB+HIV1 P24 AG SERPL QL IA: NORMAL
IMM GRANULOCYTES # BLD AUTO: 0.04 K/UL (ref 0–0.04)
IMM GRANULOCYTES NFR BLD AUTO: 0.5 % (ref 0–0.5)
LYMPHOCYTES # BLD AUTO: 4.98 K/UL (ref 1–4.8)
MCH RBC QN AUTO: 24.6 PG (ref 27–31)
MCHC RBC AUTO-ENTMCNC: 29.8 G/DL (ref 32–36)
MCV RBC AUTO: 83 FL (ref 82–98)
NUCLEATED RBC (/100WBC) (OHS): 0 /100 WBC
PLATELET # BLD AUTO: 94 K/UL (ref 150–450)
PMV BLD AUTO: 10.8 FL (ref 9.2–12.9)
POTASSIUM SERPL-SCNC: 4.3 MMOL/L (ref 3.5–5.1)
PROT SERPL-MCNC: 6.4 GM/DL (ref 6–8.4)
RBC # BLD AUTO: 4.14 M/UL (ref 4–5.4)
RELATIVE EOSINOPHIL (OHS): 0.6 %
RELATIVE LYMPHOCYTE (OHS): 60.6 % (ref 18–48)
RELATIVE MONOCYTE (OHS): 5.8 % (ref 4–15)
RELATIVE NEUTROPHIL (OHS): 32.3 % (ref 38–73)
SODIUM SERPL-SCNC: 140 MMOL/L (ref 136–145)
WBC # BLD AUTO: 8.22 K/UL (ref 3.9–12.7)

## 2025-05-05 PROCEDURE — 84460 ALANINE AMINO (ALT) (SGPT): CPT

## 2025-05-05 PROCEDURE — 86704 HEP B CORE ANTIBODY TOTAL: CPT

## 2025-05-05 PROCEDURE — 87340 HEPATITIS B SURFACE AG IA: CPT

## 2025-05-05 PROCEDURE — 87389 HIV-1 AG W/HIV-1&-2 AB AG IA: CPT

## 2025-05-05 PROCEDURE — 86480 TB TEST CELL IMMUN MEASURE: CPT

## 2025-05-05 PROCEDURE — 86803 HEPATITIS C AB TEST: CPT

## 2025-05-05 PROCEDURE — 86706 HEP B SURFACE ANTIBODY: CPT

## 2025-05-05 PROCEDURE — 85025 COMPLETE CBC W/AUTO DIFF WBC: CPT

## 2025-05-05 PROCEDURE — 85652 RBC SED RATE AUTOMATED: CPT

## 2025-05-05 PROCEDURE — 36415 COLL VENOUS BLD VENIPUNCTURE: CPT

## 2025-05-05 PROCEDURE — 86140 C-REACTIVE PROTEIN: CPT

## 2025-05-06 ENCOUNTER — HOSPITAL ENCOUNTER (OUTPATIENT)
Dept: SLEEP MEDICINE | Facility: OTHER | Age: 65
Discharge: HOME | End: 2025-05-06
Attending: NURSE PRACTITIONER
Payer: COMMERCIAL

## 2025-05-06 DIAGNOSIS — R06.02 SHORTNESS OF BREATH: ICD-10-CM

## 2025-05-06 DIAGNOSIS — R06.83 SNORING: ICD-10-CM

## 2025-05-06 DIAGNOSIS — G47.19 OTHER HYPERSOMNIA: ICD-10-CM

## 2025-05-06 DIAGNOSIS — I10 ESSENTIAL HYPERTENSION: Chronic | ICD-10-CM

## 2025-05-06 DIAGNOSIS — F51.01 PRIMARY INSOMNIA: ICD-10-CM

## 2025-05-06 LAB
MITOGEN MINUS NIL (OHS): 9.94
NIL TB SYNCED (OHS): 0.06
QUANTIFERON GOLD INTERP (OHS): NEGATIVE
TB1 AG MINUS NIL (OHS): <0
TB2 AG MINUS NIL (OHS): <0

## 2025-05-06 PROCEDURE — 95800 SLP STDY UNATTENDED: CPT

## 2025-05-07 ENCOUNTER — TELEPHONE (OUTPATIENT)
Dept: ALLERGY | Facility: CLINIC | Age: 65
End: 2025-05-07
Payer: COMMERCIAL

## 2025-05-08 ENCOUNTER — RESULTS FOLLOW-UP (OUTPATIENT)
Dept: SLEEP MEDICINE | Facility: CLINIC | Age: 65
End: 2025-05-08

## 2025-05-08 ENCOUNTER — OFFICE VISIT (OUTPATIENT)
Dept: ALLERGY | Facility: CLINIC | Age: 65
End: 2025-05-08
Payer: COMMERCIAL

## 2025-05-08 ENCOUNTER — PATIENT MESSAGE (OUTPATIENT)
Dept: INTERNAL MEDICINE | Facility: CLINIC | Age: 65
End: 2025-05-08
Payer: COMMERCIAL

## 2025-05-08 ENCOUNTER — PATIENT MESSAGE (OUTPATIENT)
Dept: HEMATOLOGY/ONCOLOGY | Facility: CLINIC | Age: 65
End: 2025-05-08
Payer: COMMERCIAL

## 2025-05-08 VITALS
WEIGHT: 200.63 LBS | DIASTOLIC BLOOD PRESSURE: 60 MMHG | BODY MASS INDEX: 32.24 KG/M2 | HEART RATE: 107 BPM | OXYGEN SATURATION: 92 % | SYSTOLIC BLOOD PRESSURE: 126 MMHG | HEIGHT: 66 IN

## 2025-05-08 DIAGNOSIS — R10.9 ABDOMINAL PAIN, UNSPECIFIED ABDOMINAL LOCATION: Primary | ICD-10-CM

## 2025-05-08 DIAGNOSIS — R89.8 EOSINOPHIL COUNT RAISED: ICD-10-CM

## 2025-05-08 DIAGNOSIS — J31.0 CHRONIC RHINITIS: ICD-10-CM

## 2025-05-08 PROBLEM — G62.9 POLYNEUROPATHY: Status: ACTIVE | Noted: 2020-07-02

## 2025-05-08 PROBLEM — K58.9 IRRITABLE BOWEL SYNDROME: Status: ACTIVE | Noted: 2020-07-02

## 2025-05-08 PROBLEM — F32.9 MAJOR DEPRESSION, SINGLE EPISODE: Status: ACTIVE | Noted: 2020-07-02

## 2025-05-08 PROCEDURE — 3074F SYST BP LT 130 MM HG: CPT | Mod: CPTII,S$GLB,, | Performed by: STUDENT IN AN ORGANIZED HEALTH CARE EDUCATION/TRAINING PROGRAM

## 2025-05-08 PROCEDURE — 3078F DIAST BP <80 MM HG: CPT | Mod: CPTII,S$GLB,, | Performed by: STUDENT IN AN ORGANIZED HEALTH CARE EDUCATION/TRAINING PROGRAM

## 2025-05-08 PROCEDURE — 3008F BODY MASS INDEX DOCD: CPT | Mod: CPTII,S$GLB,, | Performed by: STUDENT IN AN ORGANIZED HEALTH CARE EDUCATION/TRAINING PROGRAM

## 2025-05-08 PROCEDURE — 99202 OFFICE O/P NEW SF 15 MIN: CPT | Mod: S$GLB,,, | Performed by: STUDENT IN AN ORGANIZED HEALTH CARE EDUCATION/TRAINING PROGRAM

## 2025-05-08 PROCEDURE — 1159F MED LIST DOCD IN RCRD: CPT | Mod: CPTII,S$GLB,, | Performed by: STUDENT IN AN ORGANIZED HEALTH CARE EDUCATION/TRAINING PROGRAM

## 2025-05-08 PROCEDURE — 1125F AMNT PAIN NOTED PAIN PRSNT: CPT | Mod: CPTII,S$GLB,, | Performed by: STUDENT IN AN ORGANIZED HEALTH CARE EDUCATION/TRAINING PROGRAM

## 2025-05-08 PROCEDURE — 99999 PR PBB SHADOW E&M-EST. PATIENT-LVL V: CPT | Mod: PBBFAC,,, | Performed by: STUDENT IN AN ORGANIZED HEALTH CARE EDUCATION/TRAINING PROGRAM

## 2025-05-08 PROCEDURE — 1160F RVW MEDS BY RX/DR IN RCRD: CPT | Mod: CPTII,S$GLB,, | Performed by: STUDENT IN AN ORGANIZED HEALTH CARE EDUCATION/TRAINING PROGRAM

## 2025-05-08 RX ORDER — TRAZODONE HYDROCHLORIDE 300 MG/1
300 TABLET ORAL NIGHTLY
COMMUNITY
Start: 2025-04-07

## 2025-05-08 RX ORDER — VORTIOXETINE 20 MG/1
1 TABLET, FILM COATED ORAL DAILY
COMMUNITY
Start: 2025-05-03

## 2025-05-08 NOTE — PROGRESS NOTES
"Allergy Clinic Note  [facility]    This note was created by combination of typed  and dictation. Transcription errors are likely.  If there are any questions, please contact me.    HISTORY      Patient ID: Earl Abdul is a 64 y.o. female.    Chief Complaint: Allergy Testing      Referring Provider: Self, Aaareferral       History of Present Illness       Earl Abdul is a 64 y.o. female presents with concerns for food allergy.      Related medications and other interventions        5/8/2025:  At initial visit          MEDICAL HISTORY      Significant past medical history:  COPD, CLL, history E coli bacteremia, erythema nodosum, hypertension, fibromyalgia, sarcoidosis, thyroid disease, IBS, peripheral neuropathy, chronic microcytic anemia, C difficile  Active Problem List reviewed  Smoking Hx:  Client  reports that she has been smoking vaping with nicotine and cigarettes. She started smoking about 34 years ago. She has a 15 pack-year smoking history. She has never used smokeless tobacco.    Meds: MAR reviewed       Review of Systems    CONST: no F/C/NS, no unintentional weight changes  NEURO:  no tremor, no weakness  EYES: no discharge, no erythema  EARS: no hearing loss, no sensation of fullness  PULM:  no SOB, no wheezing, no cough  CV: no CP, no palpitations  DERM: no rashes, no skin breaks         PHYSICAL EXAM    /60 (BP Location: Left arm, Patient Position: Sitting)   Pulse 107   Ht 5' 6" (1.676 m)   Wt 91 kg (200 lb 9.9 oz)   SpO2 (!) 92%   BMI 32.38 kg/m²   GEN: Awake and alert, no distress  DERM:  No flushing, no rashes  EYE:  No ocular discharge, no redness  HENT: No nasal discharge, no hoarseness  PULM: Normal work of breathing, no cough  NEURO:  No focal deficit, speech fluent and logical  PSYCH: appropriate affect, normal behavior      MEDICAL DECISION-MAKING           Data reviewed:      New entries in bold face        Allergy Testing            Lab results " "     Eosinophil count 600, 2025.  Peak eosinophil count 800, 2024    Normal TSH, 2025      Imaging and other diagnostics        Medical records review      Diagnoses:   Earl Abdul is a 64 y.o. female. with  1. Abdominal pain, unspecified abdominal location    2. Eosinophil count raised    3. Chronic rhinitis          Assessment / Plan / Orders   Client has chronic rhinitis, primarily in the early morning.  Recommend evaluating for allergy to her pets and also to dust mite.  Elects skin testing.    Client has GI symptoms which are interfering with ADLs.  Discussed that allergy testing does not detect food intolerances.  She accepts.      Comorbidities  IBS    Instructions and follow up     Patient Instructions     Skin testing next visits (food, pets, indoor dust)  Stop amitriptyline at least 5 days visit  Also No antihistamines (UniSom, Benadryl, Theraflu, etc) for 5 days prior.    There is good testing available for food allergies but not for food sensitivities/intolerances.  If you are experiencing something other than worsening eczema, immediate hives or immediate whole body allergic reaction, then we would consider that a food intolerance, and allergy testing is unlikely to be helpful.    For most food sensitivities/intolerances, detection is a matter of trial and error.    Warning:  there are panels marketed as "food sensitivity testing," but they are not backed by science.  What the tests actually measure is food exposure.            Follow up for skin testing.      Lary Newby MD  Allergy, Asthma & Immunology          "

## 2025-05-08 NOTE — PATIENT INSTRUCTIONS
"  Skin testing next visits (food, pets, indoor dust)  Stop amitriptyline at least 5 days visit  Also No antihistamines (UniSom, Benadryl, Theraflu, etc) for 5 days prior.    There is good testing available for food allergies but not for food sensitivities/intolerances.  If you are experiencing something other than worsening eczema, immediate hives or immediate whole body allergic reaction, then we would consider that a food intolerance, and allergy testing is unlikely to be helpful.    For most food sensitivities/intolerances, detection is a matter of trial and error.    Warning:  there are panels marketed as "food sensitivity testing," but they are not backed by science.  What the tests actually measure is food exposure.          "

## 2025-05-09 ENCOUNTER — PATIENT MESSAGE (OUTPATIENT)
Dept: GASTROENTEROLOGY | Facility: CLINIC | Age: 65
End: 2025-05-09
Payer: COMMERCIAL

## 2025-05-12 DIAGNOSIS — G47.33 OBSTRUCTIVE SLEEP APNEA: Primary | ICD-10-CM

## 2025-05-13 ENCOUNTER — PATIENT MESSAGE (OUTPATIENT)
Dept: INTERNAL MEDICINE | Facility: CLINIC | Age: 65
End: 2025-05-13
Payer: COMMERCIAL

## 2025-05-13 ENCOUNTER — HOSPITAL ENCOUNTER (OUTPATIENT)
Dept: RADIOLOGY | Facility: OTHER | Age: 65
Discharge: HOME | End: 2025-05-13
Attending: INTERNAL MEDICINE
Payer: COMMERCIAL

## 2025-05-13 DIAGNOSIS — R59.0 LOCALIZED ENLARGED LYMPH NODES: ICD-10-CM

## 2025-05-13 PROCEDURE — 70491 CT SOFT TISSUE NECK W/DYE: CPT | Mod: TC

## 2025-05-13 PROCEDURE — 70491 CT SOFT TISSUE NECK W/DYE: CPT | Mod: 26,,, | Performed by: RADIOLOGY

## 2025-05-13 PROCEDURE — 25500020 PHARM REV CODE 255: Performed by: INTERNAL MEDICINE

## 2025-05-13 RX ADMIN — IOHEXOL 100 ML: 350 INJECTION, SOLUTION INTRAVENOUS at 12:05

## 2025-05-13 NOTE — TELEPHONE ENCOUNTER
""Dr Rodriguez,  At  my virtual appointment last week we discussed the possibility of iron infusion for my low iron levels that maybe be contributing to my restless leg problem. My stomach problems and nausea let me from taking oral iron. Please let me know.  Earl"    Please advise   "

## 2025-05-14 ENCOUNTER — RESULTS FOLLOW-UP (OUTPATIENT)
Dept: INTERNAL MEDICINE | Facility: CLINIC | Age: 65
End: 2025-05-14

## 2025-05-14 NOTE — PROGRESS NOTES
Patient notified via [employer] message    The enlarged lymph nodes seen on multiple imaging studies are most likely consistent with your history of CLL. I recommend following up with your hematology oncology Dr. Perez. She will decide if biopsy is warranted versus treatment versus monitoring.

## 2025-05-27 ENCOUNTER — OFFICE VISIT (OUTPATIENT)
Dept: RHEUMATOLOGY | Facility: CLINIC | Age: 65
End: 2025-05-27
Payer: COMMERCIAL

## 2025-05-27 ENCOUNTER — TELEPHONE (OUTPATIENT)
Dept: UROLOGY | Facility: CLINIC | Age: 65
End: 2025-05-27
Payer: COMMERCIAL

## 2025-05-27 DIAGNOSIS — M25.50 ARTHRALGIA, UNSPECIFIED JOINT: ICD-10-CM

## 2025-05-27 DIAGNOSIS — D86.9 SARCOIDOSIS: Primary | ICD-10-CM

## 2025-05-27 DIAGNOSIS — Z71.89 COUNSELING AND COORDINATION OF CARE: ICD-10-CM

## 2025-05-27 PROCEDURE — G2211 COMPLEX E/M VISIT ADD ON: HCPCS | Mod: 95,,, | Performed by: STUDENT IN AN ORGANIZED HEALTH CARE EDUCATION/TRAINING PROGRAM

## 2025-05-27 PROCEDURE — 98006 SYNCH AUDIO-VIDEO EST MOD 30: CPT | Mod: 95,,, | Performed by: STUDENT IN AN ORGANIZED HEALTH CARE EDUCATION/TRAINING PROGRAM

## 2025-05-27 NOTE — TELEPHONE ENCOUNTER
----- Message from Baylee sent at 5/27/2025  2:41 PM CDT -----  Type: Patient callWho called: Patient  (Henrique)Does the patient know what this is regarding? Requesting a call back in regards to a request being sent over from [employer] for patient device ; will need to filled out and sent back over; please advise Would the patient rather a call back or response via [facility]? [facility]: [phone] information:

## 2025-05-27 NOTE — PROGRESS NOTES
The patient location is: [place]  The chief complaint leading to consultation is: follow up    Visit type: audiovisual    Face to Face time with patient: 15 minutes   30 minutes of total time spent on the encounter, which includes face to face time and non-face to face time preparing to see the patient (eg, review of tests), Obtaining and/or reviewing separately obtained history, Documenting clinical information in the electronic or other health record, Independently interpreting results (not separately reported) and communicating results to the patient/family/caregiver, or Care coordination (not separately reported).         Each patient to whom he or she provides medical services by telemedicine is:  (1) informed of the relationship between the physician and patient and the respective role of any other health care provider with respect to management of the patient; and (2) notified that he or she may decline to receive medical services by telemedicine and may withdraw from such care at any time.    Notes:     Answers submitted by the patient for this visit:  Rheumatology Questionnaire (Submitted on 12/12/2024)  fever: No  eye redness: No  mouth sores: No  headaches: Yes  shortness of breath: No  chest pain: No  trouble swallowing: No  diarrhea: No  constipation: No     RHEUMATOLOGY OUTPATIENT CLINIC NOTE    5/29/2025    Attending Rheumatologist: Josh Connelly  Primary Care Provider: Andrew Rodriguez MD   Physician Requesting Consultation: No referring provider defined for this encounter.  Chief Complaint/Reason For Consultation:  No chief complaint on file.      Subjective:       HPI  Earl Abdul is a 65 y.o. White female with pmhx noted below referred for   Crohn's //CLL (once a year)  Sarcoidosis   No flare uo in yrs  4/23/2025:Patient here for follow up   She has had a significant mental decline   She also is having joint pains, fatigue, SOB      5/29/2025: Patient here for follow up   Patient  back from [place] last night   Think she might be catching a cold  We were thinking starting medication for sarcoidosis but patient with a diagnosis of CLL and some of the labwork abnormality and lymphadenopathy can be 2.2 to CLL     Review of Systems   Constitutional:  Negative for fever and unexpected weight change.   HENT:  Negative for mouth sores and trouble swallowing.    Eyes:  Negative for redness.   Respiratory:  Negative for cough and shortness of breath.    Cardiovascular:  Negative for chest pain.   Gastrointestinal:  Negative for constipation and diarrhea.   Genitourinary:  Negative for dysuria and genital sores.   Integumentary:  Negative for rash.   Neurological:  Positive for headaches.   Hematological:  Does not bruise/bleed easily.        Chronic comorbid conditions affecting medical decision making today:  Past Medical History:   Diagnosis Date    Alcoholism     c/b alcohol withdrawl seizures 7/2017    Anemia     Aortic atherosclerosis 04/17/2024    C. difficile colitis     Cancer of breast 10/2020    s/p bilateral mastectomy for  T1b N0 stage IA breast cancer October 2020    CLL (chronic lymphocytic leukemia) 09/30/2022 6/22/22 - PB flow cytometry  Immunophenotyping of peripheral blood detects a distinct kappa light chain restricted monoclonal B-cell population  (calculated at 2.44x10 9 /L, from the most recent CBC showing a total WBC of  7.35 K/uL with 61% total lymphocytes)  with a CLL phenotype (coexpression of CD19, CD5, CD23 and dim CD20). CD22 (FITC), FMC-7 and CD38 are negative in this population.    Controlled type 2 diabetes mellitus without complication, without long-term current use of insulin 11/30/2021    COPD (chronic obstructive pulmonary disease)     Depression     Diverticulitis     Fatty liver     GERD (gastroesophageal reflux disease)     Hyperlipidemia     Hypertension     Pancreatitis     Peptic ulcer disease     Polysubstance abuse     Posterior reversible encephalopathy  syndrome     Sarcoidosis of lung     over 30 yrs ago    Seizures     last seizure 2 years ago    Suicide attempt      Past Surgical History:   Procedure Laterality Date    APPENDECTOMY      BILATERAL MASTECTOMY Bilateral 10/29/2020    Procedure: MASTECTOMY, BILATERAL;  Surgeon: Baylee Kevin MD;  Location: [facility] OR [address] [place] [address];  Service: General;  Laterality: Bilateral;    BREAST REVISION SURGERY Bilateral 2/11/2021    Procedure: BREAST REVISION SURGERY;  Surgeon: Scottie Johnson MD;  Location: [facility] OR [address] [place] [address];  Service: Plastics;  Laterality: Bilateral;    COLONOSCOPY N/A 7/28/2017    Procedure: COLONOSCOPY;  Surgeon: Aaron Alvarado MD;  Location: [facility];  Service: Endoscopy;  Laterality: N/A;    COLONOSCOPY, SCREENING, HIGH RISK PATIENT N/A 1/9/2025    Procedure: COLONOSCOPY, SCREENING, HIGH RISK PATIENT;  Surgeon: Aayush Valente MD;  [facility]  Service: Endoscopy;  Laterality: N/A;    ESOPHAGOGASTRODUODENOSCOPY  10/7/2016, 11/6/2014    2016 - gastritis, duodenitis, 2014 erosive gastritis    ESOPHAGOGASTRODUODENOSCOPY N/A 2/11/2020    Procedure: ESOPHAGOGASTRODUODENOSCOPY (EGD);  Surgeon: Fawn Garrido MD;  Location: [facility];  Service: Endoscopy;  Laterality: N/A;    ESOPHAGOGASTRODUODENOSCOPY N/A 4/19/2021    Procedure: EGD (ESOPHAGOGASTRODUODENOSCOPY);  Surgeon: Paramjit Martino MD;  Location: [facility];  Service: Endoscopy;  Laterality: N/A;    ESOPHAGOGASTRODUODENOSCOPY N/A 1/9/2025    Procedure: EGD (ESOPHAGOGASTRODUODENOSCOPY);  Surgeon: Aayush Valente MD;  Location: [facility] ([address] [place] [address]);  Service: Endoscopy;  Laterality: N/A;  referral dr santiago/ prep inst given in office/ pt requested sutabs/diabetic/eliquis  11/12 Precall performed. Pt needs rx called and instructions resent to mychart SS  11/13/24- Rx and instructions sent as requested. DBM  11/14 pt r/s, Ok to hold Eliqui    FLEXIBLE SIGMOIDOSCOPY  11/06/2014    colitis    HYSTERECTOMY      IMPLANTATION OF  PERMANENT SACRAL NERVE STIMULATOR N/A 7/12/2022    Procedure: INSERTION, NEUROSTIMULATOR, PERMANENT, SACRAL;  Surgeon: Juaquin Edwards MD;  Location: [facility] OR [address] [place] [address];  Service: Urology;  Laterality: N/A;  1hr    INJECTION FOR SENTINEL NODE IDENTIFICATION Right 10/29/2020    Procedure: INJECTION, FOR SENTINEL NODE IDENTIFICATION;  Surgeon: Baylee Kevin MD;  Location: [address];  Service: General;  Laterality: Right;    INJECTION OF JOINT Right 10/10/2019    Procedure: Injection, Joint RIGHT ILIOPSOAS BURSA/TENDON INJECTION AND RIGHT GLUTEAL TENDON INJECTION WITH STEROID AND LIDOCAINE;  Surgeon: Guillaume Rico MD;  Location: [facility];  Service: Pain Management;  Laterality: Right;  NEEDS CONSENT    INSERTION OF BREAST TISSUE EXPANDER Bilateral 10/29/2020    Procedure: INSERTION, TISSUE EXPANDER, BREAST;  Surgeon: Scottie Johnson MD;  Location: [address];  Service: Plastics;  Laterality: Bilateral;  Right breast: 1082 g  Left breast: 1076 g    LIPOSUCTION Bilateral 2/11/2021    Procedure: LIPOSUCTION;  Surgeon: Scottie Johnson MD;  Location: [address];  Service: Plastics;  Laterality: Bilateral;    mediastenoscopy      REPLACEMENT OF IMPLANT OF BREAST Bilateral 2/11/2021    Procedure: REPLACEMENT, IMPLANT, BREAST;  Surgeon: Scottie Johnson MD;  Location: [address];  Service: Plastics;  Laterality: Bilateral;    SENTINEL LYMPH NODE BIOPSY Right 10/29/2020    Procedure: BIOPSY, LYMPH NODE, SENTINEL;  Surgeon: Baylee Kevin MD;  Location: [address];  Service: General;  Laterality: Right;    TONSILLECTOMY N/A 1970    TUBAL LIGATION       Family History   Problem Relation Name Age of Onset    Heart attack Father      Diabetes Father      Hypertension Father      Diabetes Mother      Hypertension Mother      Breast cancer Maternal Aunt      Colon cancer Maternal Uncle      Breast cancer Daughter      Ovarian cancer Neg Hx      Cancer Neg Hx       Social History      Substance and Sexual Activity   Alcohol Use Not Currently    Comment: vodka daily (half a regular bottle) for 4 days     Social History     Tobacco Use   Smoking Status Every Day    Current packs/day: 0.00    Average packs/day: 0.5 packs/day for 30.0 years (15.0 ttl pk-yrs)    Types: Vaping with nicotine, Cigarettes    Start date: 1991    Last attempt to quit: 2021    Years since quittin.3   Smokeless Tobacco Never   Tobacco Comments    Patient is currently smoking 10 cigarettes a day, declines nicotine patches     Social History     Substance and Sexual Activity   Drug Use Not Currently    Types: Marijuana    Comment: gummies       Current Outpatient Medications:     amitriptyline (ELAVIL) 25 MG tablet, Take 25-50 mg by mouth every evening., Disp: , Rfl:     apixaban (ELIQUIS) 5 mg Tab, Take 1 tablet by mouth 2 (two) times daily., Disp: , Rfl:     atogepant (QULIPTA) 60 mg Tab, Take 60 mg by mouth once daily. (Patient not taking: Reported on 2025), Disp: , Rfl:     cyanocobalamin (VITAMIN B-12) 1000 MCG tablet, Take 100 mcg by mouth once daily. Once every morning. (Patient not taking: Reported on 2025), Disp: , Rfl:     diclofenac sodium (VOLTAREN) 1 % Gel, Apply 2 g topically 4 (four) times daily. (Patient not taking: Reported on 2025), Disp: 100 g, Rfl: 11    ergocalciferol (ERGOCALCIFEROL) 50,000 unit Cap, Take 1 capsule by mouth every 7 days. (Patient not taking: Reported on 2025), Disp: , Rfl:     folic acid (FOLVITE) 1 MG tablet, Take 1 tablet (1 mg total) by mouth once daily. (Patient not taking: Reported on 2025), Disp: 30 tablet, Rfl: 0    gabapentin (NEURONTIN) 300 MG capsule, Take 1 capsule (300 mg total) by mouth 3 (three) times daily., Disp: 90 capsule, Rfl: 0    hydrOXYzine pamoate (VISTARIL) 50 MG Cap, Take 50 mg by mouth daily as needed., Disp: , Rfl:     lancets Misc, Use to check blood glucose 4 times daily (Patient not taking: Reported on 2025), Disp: 100  each, Rfl: 5    levothyroxine (SYNTHROID) 75 MCG tablet, Take 1 tablet (75 mcg total) by mouth before breakfast., Disp: 90 tablet, Rfl: 1    magnesium oxide (MAG-OX) 400 mg (241.3 mg magnesium) tablet, TAKE 1 TABLET BY MOUTH EVERY MORNING, Disp: 90 tablet, Rfl: 0    metFORMIN (GLUCOPHAGE-XR) 500 MG ER 24hr tablet, Take 1 tablet (500 mg total) by mouth once daily. (Patient not taking: Reported on 5/8/2025), Disp: 360 tablet, Rfl: 3    modafiniL (PROVIGIL) 100 MG Tab, Take 100 mg by mouth every morning., Disp: , Rfl:     multivitamin Tab, Take 1 tablet by mouth once daily., Disp: 30 tablet, Rfl: 0    naltrexone (DEPADE) 50 mg tablet, Take 50 mg by mouth once daily., Disp: , Rfl:     naproxen (NAPROSYN) 500 MG tablet, Take 1 tablet (500 mg total) by mouth 2 (two) times a day. (Patient not taking: Reported on 5/8/2025), Disp: 30 tablet, Rfl: 0    omega-3 fatty acids 1,000 mg Cap, Take 1 capsule by mouth once daily., Disp: , Rfl:     omega-3 fatty acids/fish oil (FISH OIL-OMEGA-3 FATTY ACIDS) 300-1,000 mg capsule, Take 2 capsules by mouth once daily. Take mornings and nights., Disp: , Rfl:     omeprazole (PRILOSEC) 20 MG capsule, Take 1 capsule (20 mg total) by mouth before breakfast., Disp: 90 capsule, Rfl: 2    ondansetron (ZOFRAN-ODT) 4 MG TbDL, Take 8 mg by mouth every 8 (eight) hours as needed., Disp: , Rfl:     rimegepant (NURTEC) 75 mg odt, Take 1 tablet (75 mg total) by mouth every other day. Place ODT tablet on the tongue; alternatively the ODT tablet may be placed under the tongue, Disp: 15 tablet, Rfl: 1    risperiDONE (RISPERDAL) 0.25 MG Tab, Take 0.25 mg by mouth 2 (two) times daily., Disp: , Rfl:     scopolamine (TRANSDERM-SCOP) 1.3-1.5 mg (1 mg over 3 days), Place 1 patch onto the skin every 72 hours., Disp: 7 patch, Rfl: 2    traZODone (DESYREL) 300 MG tablet, Take 300 mg by mouth every evening., Disp: , Rfl:     TRINTELLIX 20 mg Tab, Take 1 tablet by mouth Daily., Disp: , Rfl:   No current  facility-administered medications for this visit.    Facility-Administered Medications Ordered in Other Visits:     albuterol sulfate nebulizer solution 2.5 mg, 2.5 mg, Nebulization, Once, Andrwe Rodriguez MD     Objective:         There were no vitals filed for this visit.    Physical Exam   Constitutional: She is oriented to person, place, and time.   HENT:   Head: Normocephalic and atraumatic.   Right Ear: External ear normal.   Left Ear: External ear normal.   Nose: Nose normal.   Mouth/Throat: Oropharynx is clear and moist.   Eyes: Pupils are equal, round, and reactive to light. Conjunctivae are normal.   Cardiovascular: Normal rate and regular rhythm.   Pulmonary/Chest: Effort normal and breath sounds normal.   Abdominal: Soft. Bowel sounds are normal.   Musculoskeletal:         General: Tenderness present.      Cervical back: Normal range of motion and neck supple.      Comments: Multiple tender/trigger points    Neurological: She is alert and oriented to person, place, and time.   Skin: No rash noted. No erythema.   Psychiatric: Mood and affect normal.   5/27/2025:  Virtual    Reviewed old and all outside pertinent medical records available.    All lab results personally reviewed and interpreted by me.  Lab Results   Component Value Date    WBC 8.22 05/05/2025    HGB 10.2 (L) 05/05/2025    HCT 34.2 (L) 05/05/2025    MCV 83 05/05/2025    MCH 24.6 (L) 05/05/2025    MCHC 29.8 (L) 05/05/2025    RDW 15.3 (H) 05/05/2025    PLT 94 (L) 05/05/2025    MPV 10.8 05/05/2025    PLTEST Decreased (A) 02/13/2025       Lab Results   Component Value Date     05/05/2025    K 4.3 05/05/2025     05/05/2025    CO2 28 05/05/2025     (H) 05/05/2025    BUN 10 05/05/2025    CALCIUM 8.3 (L) 05/05/2025    PROT 6.4 05/05/2025    ALBUMIN 3.7 05/05/2025    BILITOT 0.3 05/05/2025    AST 17 05/05/2025    ALKPHOS 51 05/05/2025    ALT 16 05/05/2025       Lab Results   Component Value Date    COLORU Yellow 03/29/2025     "APPEARANCEUA Clear 03/29/2025    SPECGRAV 1.015 03/29/2025    PHUR 6.0 03/29/2025    PROTEINUA Negative 03/29/2025    KETONESU Trace (A) 10/22/2024    LEUKOCYTESUR Negative 03/29/2025    NITRITE Negative 03/29/2025    UROBILINOGEN Negative 03/29/2025       Lab Results   Component Value Date    CRP 5.8 05/05/2025       Lab Results   Component Value Date    SEDRATE 4 05/05/2025       Lab Results   Component Value Date    ROYCE Negative 02/11/2009    RF <13.0 12/18/2024    SEDRATE 4 05/05/2025       No components found for: "25OHVITDTOT", "84JNRDIT7", "61VRNHBR2", "METHODNOTE"    Lab Results   Component Value Date    URICACID 8.7 (H) 10/15/2014       No components found for: "TSPOTTB"    Lab Results   Component Value Date    ROYCE Negative 02/11/2009        Imaging:  All imaging reviewed and independently interpreted by me.         ASSESSMENT / PLAN:     Earl Abdul is a 65 y.o. White female with:      1. Arthralgia, unspecified joint  - Ambulatory referral/consult to Rheumatology  - traMADoL (ULTRAM) 50 mg tablet; Take 1 tablet (50 mg total) by mouth every 4 (four) hours as needed for Pain.  Dispense: 28 tablet; Refill: 0  - Unclear if 2.2 to sarcoidosis- mix results with elevated IL-2, normal inflammatory markers, low ACE and low vitamin D normal calcitriol     2. Sarcoidosis (Primary)  - Unclear if 2.2 to sarcoidosis- mix results with elevated IL-2, normal inflammatory markers, low ACE and low vitamin D normal calcitriol   - Could IL-2 be elevated due to CLL history   - Since having joint pain, fatigue and sob with elevated IL-2 I would treat for sarcoidosis but she also has a hx of CLL which I think could cloud the IL-2 elevation   - will wait until patient sees Dr. Perez    3. Counseling and coordination of care  - over 10 minutes spent regarding below topics:  - Immunization counseling done.  - Weight loss counseling done.  - Nutrition and exercise counseling.  - Limitation of alcohol consumption.  - Regular " exercise:  Aerobic and resistance.  - Medication counseling provided.    Follow up in about 3 months (around 8/27/2025).    Method of contact with patient concerns: [facility]    Disclaimer:  This note is prepared using voice recognition software and as such is likely to have errors and has not been proof read. Please contact me for questions.     Time spent: 30 minutes in face to face discussion concerning diagnosis, prognosis, review of lab and test results, benefits of treatment as well as management of disease, counseling of patient and coordination of care between various health care providers.  Greater than half the time spent was used for coordination of care and counseling of patient.    Josh Connelly M.D.  Rheumatology Department   [facility]   unexpected weight change: No  genital sore: No  dysuria: No  During the last 3 days, have you had a skin rash?: No  Bruises or bleeds easily: No  cough: No    Today's visit is associated with current or anticipated ongoing medical care related to this patient's diagnosis of sarcoidosis, which is a chronic disease which will require regular follow up to manage symptoms and progression. The patient is to return to the office within a minimum of 3-6 months to review symptoms and function at that time. CPT code

## 2025-05-28 ENCOUNTER — TELEPHONE (OUTPATIENT)
Dept: UROLOGY | Facility: CLINIC | Age: 65
End: 2025-05-28
Payer: COMMERCIAL

## 2025-05-28 NOTE — TELEPHONE ENCOUNTER
----- Message from Med Assistant Bettencourt sent at 5/27/2025  4:02 PM CDT -----  Regarding: FW: Missed Call  Contact: zen    ----- Message -----  From: Kala Hummel  Sent: 5/27/2025   3:51 PM CDT  To: Jerry Reza Staff  Subject: Missed Call                                      Returning a Missed CallCaller: Jac call to: MAREK/Kristy can be reached @:[phone] (work)Nature of the call: Pt needs to know wether or not the uro got  the [facility] documentation. Received a all but no name was on the voicemail. Would like a call back in order to further discuss

## 2025-06-02 ENCOUNTER — OFFICE VISIT (OUTPATIENT)
Dept: URGENT CARE | Facility: CLINIC | Age: 65
End: 2025-06-02
Payer: COMMERCIAL

## 2025-06-02 ENCOUNTER — RESULTS FOLLOW-UP (OUTPATIENT)
Dept: URGENT CARE | Facility: CLINIC | Age: 65
End: 2025-06-02

## 2025-06-02 VITALS
RESPIRATION RATE: 16 BRPM | HEART RATE: 88 BPM | TEMPERATURE: 98 F | WEIGHT: 200.63 LBS | DIASTOLIC BLOOD PRESSURE: 67 MMHG | SYSTOLIC BLOOD PRESSURE: 133 MMHG | BODY MASS INDEX: 32.24 KG/M2 | OXYGEN SATURATION: 95 % | HEIGHT: 66 IN

## 2025-06-02 DIAGNOSIS — R05.9 COUGH, UNSPECIFIED TYPE: ICD-10-CM

## 2025-06-02 DIAGNOSIS — J01.90 ACUTE NON-RECURRENT SINUSITIS, UNSPECIFIED LOCATION: Primary | ICD-10-CM

## 2025-06-02 DIAGNOSIS — R39.15 URINARY URGENCY: ICD-10-CM

## 2025-06-02 LAB
BILIRUBIN, UA POC OHS: NEGATIVE
BLOOD, UA POC OHS: NEGATIVE
CLARITY, UA POC OHS: ABNORMAL
COLOR, UA POC OHS: YELLOW
CTP QC/QA: YES
GLUCOSE, UA POC OHS: NEGATIVE
KETONES, UA POC OHS: NEGATIVE
LEUKOCYTES, UA POC OHS: NEGATIVE
NITRITE, UA POC OHS: NEGATIVE
PH, UA POC OHS: 6
PROTEIN, UA POC OHS: NEGATIVE
SARS-COV+SARS-COV-2 AG RESP QL IA.RAPID: NEGATIVE
SPECIFIC GRAVITY, UA POC OHS: 1.02
UROBILINOGEN, UA POC OHS: 0.2

## 2025-06-02 PROCEDURE — 81003 URINALYSIS AUTO W/O SCOPE: CPT | Mod: QW,S$GLB,,

## 2025-06-02 PROCEDURE — 87811 SARS-COV-2 COVID19 W/OPTIC: CPT | Mod: QW,S$GLB,,

## 2025-06-02 PROCEDURE — 99204 OFFICE O/P NEW MOD 45 MIN: CPT | Mod: S$GLB,,,

## 2025-06-02 PROCEDURE — 71046 X-RAY EXAM CHEST 2 VIEWS: CPT | Mod: S$GLB,,, | Performed by: RADIOLOGY

## 2025-06-02 RX ORDER — AMOXICILLIN AND CLAVULANATE POTASSIUM 875; 125 MG/1; MG/1
1 TABLET, FILM COATED ORAL EVERY 12 HOURS
Qty: 14 TABLET | Refills: 0 | Status: SHIPPED | OUTPATIENT
Start: 2025-06-02 | End: 2025-06-09

## 2025-06-05 ENCOUNTER — OFFICE VISIT (OUTPATIENT)
Dept: HEMATOLOGY/ONCOLOGY | Facility: CLINIC | Age: 65
End: 2025-06-05
Payer: COMMERCIAL

## 2025-06-05 VITALS
WEIGHT: 198 LBS | DIASTOLIC BLOOD PRESSURE: 61 MMHG | BODY MASS INDEX: 31.82 KG/M2 | HEART RATE: 84 BPM | OXYGEN SATURATION: 94 % | TEMPERATURE: 98 F | SYSTOLIC BLOOD PRESSURE: 133 MMHG | HEIGHT: 66 IN

## 2025-06-05 DIAGNOSIS — C91.10 CHRONIC LYMPHOCYTIC LEUKEMIA: Primary | ICD-10-CM

## 2025-06-05 DIAGNOSIS — D50.9 IRON DEFICIENCY ANEMIA, UNSPECIFIED IRON DEFICIENCY ANEMIA TYPE: ICD-10-CM

## 2025-06-05 PROCEDURE — 3078F DIAST BP <80 MM HG: CPT | Mod: CPTII,S$GLB,, | Performed by: INTERNAL MEDICINE

## 2025-06-05 PROCEDURE — 3008F BODY MASS INDEX DOCD: CPT | Mod: CPTII,S$GLB,, | Performed by: INTERNAL MEDICINE

## 2025-06-05 PROCEDURE — 99215 OFFICE O/P EST HI 40 MIN: CPT | Mod: S$GLB,,, | Performed by: INTERNAL MEDICINE

## 2025-06-05 PROCEDURE — 1101F PT FALLS ASSESS-DOCD LE1/YR: CPT | Mod: CPTII,S$GLB,, | Performed by: INTERNAL MEDICINE

## 2025-06-05 PROCEDURE — 3288F FALL RISK ASSESSMENT DOCD: CPT | Mod: CPTII,S$GLB,, | Performed by: INTERNAL MEDICINE

## 2025-06-05 PROCEDURE — 3075F SYST BP GE 130 - 139MM HG: CPT | Mod: CPTII,S$GLB,, | Performed by: INTERNAL MEDICINE

## 2025-06-05 PROCEDURE — 1125F AMNT PAIN NOTED PAIN PRSNT: CPT | Mod: CPTII,S$GLB,, | Performed by: INTERNAL MEDICINE

## 2025-06-05 PROCEDURE — 99999 PR PBB SHADOW E&M-EST. PATIENT-LVL III: CPT | Mod: PBBFAC,,, | Performed by: INTERNAL MEDICINE

## 2025-06-05 RX ORDER — EPINEPHRINE 0.3 MG/.3ML
0.3 INJECTION SUBCUTANEOUS ONCE AS NEEDED
OUTPATIENT
Start: 2025-06-05

## 2025-06-05 RX ORDER — SODIUM CHLORIDE 0.9 % (FLUSH) 0.9 %
10 SYRINGE (ML) INJECTION
OUTPATIENT
Start: 2025-06-05

## 2025-06-05 RX ORDER — HEPARIN 100 UNIT/ML
500 SYRINGE INTRAVENOUS
OUTPATIENT
Start: 2025-06-05

## 2025-06-05 NOTE — PROGRESS NOTES
"Lymphoma Clinic Visit     Reason for visit: Follow-up CLL     History of Present Illness:  Ms. Abdul is a very pleasant 64 year old White woman who presents for follow-up of CLL.   She reports significaant limb pains - avearaging about 7 -8/10 daily. She was seen by rheumartology who is considering starting an immunosuppression. She states the pain wears her out during the day. She was trialled on tramadol which didn't help.  She states her iron is low. She has restless legs. She denies fever, chills ,night sweats, abdominal pain, nausea or vomiting. Denies having an appetite. She was at an urgent care 3 days ago for URTI. She reports feeling better on Augmentin.    Her last ferritin level is 15. Will work on setting up iron infusion.    She reports to have had a PET scan of her brain about 1 week ago for evaluation for cognitive dysfunction. [facility] still awaited [facility] She has been off EToH for "months and months". Last CT A/P showed concern for liver cirrhosis and splenomegaly. Follows with hepatology.     Oncology History:  6/10/22: wbc 22.5K, PB flow --> CLL phenotype, FISH: del 13q  10/31/23: neg TP53 mutation, pos IGHV mutation  2/13/25: CT A/P w/ LAD noted in bilat iliac chain LNs, splenomegaly     Past medical history:  - Stage IA HR+ breast cancer s/p bilat mastectomy  - DM II  - HTN  - HLD  - EtOH misuse  - Seizure 2/2 EtOH withdrawal  - Chronic back pain  - Pulmonary sarcoidosis  - Frontal lobe dementia  - Depression/history of suicide attempt  - Aortic atherosclerosis  - C diff colitis  - COPD  - Diverticulitis  - Fatty liver  - GERD  - Pancreatitis      Past surgical history:   - Bilat mastectomy/R axillary SLNBx/insertion of breast tissue expander  - Bilat breast revision surgery/implants/liposuction  - Appendectomy  - Hysterectomy  - Tubal ligation  - Tonsillectomy  - Mediastinoscopy  - Implantation of sacral nerve stimulator     Medications:  Current Outpatient " Medications   Medication Instructions    amitriptyline (ELAVIL) 25-50 mg, Nightly    apixaban (ELIQUIS) 5 mg Tab 1 tablet, 2 times daily    azithromycin (Z-DENVER) 250 MG tablet Take 2 tablets by mouth on day 1; Take 1 tablet by mouth on days 2-5      clonazePAM (KLONOPIN) 0.25 mg, Nightly    cyanocobalamin (VITAMIN B-12) 100 mcg, Daily    diclofenac sodium (VOLTAREN) 2 g, Topical (Top), 4 times daily    donepeziL (ARICEPT) 5 MG tablet 1 tablet, Nightly    ergocalciferol (ERGOCALCIFEROL) 50,000 unit Cap 1 capsule, Every 7 days    EScitalopram oxalate (LEXAPRO) 10 mg, Daily    fluticasone furoate (ARNUITY ELLIPTA) 100 mcg/actuation inhaler 1 puff, Inhalation, Daily, Rinse mouth after each use.    folic acid (FOLVITE) 1 mg, Oral, Daily    gabapentin (NEURONTIN) 300 mg, Oral, 3 times daily    hydrOXYzine pamoate (VISTARIL) 50 mg, Daily PRN    levothyroxine (SYNTHROID) 75 mcg, Oral, Before breakfast    magnesium oxide (MAG-OX) 400 mg, Oral, Every morning    metFORMIN (GLUCOPHAGE-XR) 500 mg, Oral, Daily    modafiniL (PROVIGIL) 100 mg, Every morning    multivitamin Tab 1 tablet, Oral, Daily    naltrexone (DEPADE) 50 mg, Daily    naproxen (NAPROSYN) 500 mg, Oral, 2 times daily    omega-3 fatty acids 1,000 mg Cap 1 capsule, Daily    omega-3 fatty acids/fish oil (FISH OIL-OMEGA-3 FATTY ACIDS) 300-1,000 mg capsule 2 capsules, Daily    omeprazole (PRILOSEC) 20 mg, Oral, Before breakfast    ondansetron (ZOFRAN) 8 mg, Oral, Every 8 hours PRN    propranoloL (INDERAL) 20 mg, 2 times daily    QUEtiapine (SEROQUEL) 50 mg, Nightly    QULIPTA 60 mg, Daily    risperiDONE (RISPERDAL) 0.25 mg, 2 times daily    scopolamine (TRANSDERM-SCOP) 1.3-1.5 mg (1 mg over 3 days) 1 patch, Transdermal, Every 72 hours    tiotropium bromide (SPIRIVA RESPIMAT) 2.5 mcg/actuation inhaler 2 puffs, Inhalation, Daily, Controller    traZODone (DESYREL) 200 mg, Oral, Nightly    TRINTELLIX 5 mg, Every morning    TURMERIC ORAL 250 mg, Every morning       "    Allergies:  Review of patient's allergies indicates:   Allergen Reactions    Lortab [hydrocodone-acetaminophen] Itching    Promethazine Itching and Other (See Comments)    Albuterol Confusion         Social History:  Smokes cigarettes (~5-6 cigarettes/day) and vapes hourly.  Pt denies EtOH/IDU but caretaker reports heavy EtOH use.  Lives w/ .  Retired, former artist ().     Family History:  Daughter - breast CA      Review of Systems:  As per HPI.  12 point ROS conducted and otherwise negative.     Physical Exam:  Vitals:    06/05/25 0819   BP: 133/61   BP Location: Left arm   Patient Position: Sitting   Pulse: 84   Temp: 97.8 °F (36.6 °C)   TempSrc: Oral   SpO2: (!) 94%   Weight: 89.8 kg (197 lb 15.6 oz)   Height: 5' 6" (1.676 m)       Gen: WDWN, pleasant, talkative, oriented x 2, ambulatory, NAD  HEENT: Normocephalic, atraumatic, EOMI, anicteric sclerae  Lymph: no palpable cervical, supraclavicular, axillary, or inguinal LAD  Pulm: +wheezing  CV: RR, no M/R/G, no LE edema  Abd: soft, NTND, unable to assess for SM due to body habitus  Skin: no rash or lesions  Neuro: CN II-XII grossly intact  Psych: cooperative, normal speech/eye contact, blunted affect    ECOG Performance Status: 2 (due to depression/dementia)  Karnofsky PS: 60     Labs/Imaging/Pathology: Reviewed in Epic, pertinent data included in Oncology History    Assessment/Plan:  63 yo woman with CLL who is here for follow -up    Chronic lymphocytic leukemia  Recent labs reviewed  No overt evidence of disease progression.  IgG wnl on 2/12/25  Repeat labs at next visit     Normocytic Anemia  Consistent since [id number]  Recent ferritin noted to be 15 c/w iron deficiency  EGD/colonoscopy performed on 1/9/25 - stomach biopsy c/w erosive/chemical type gastropathy, H pylori neg, no evidence of microscopic colitis  Will work on iron infusion.     Thrombocytopenia  Consistent since 2018  May be 2/2 liver disease/EtOH  Fortunately she has stop EtOH " intake.      BMT Chart Routing      Follow up with physician . Dr. Perez -   Follow up with TERESO    Provider visit type Malignant hem   Infusion scheduling note New or changed treatment   Kindly schedule iron infusion asap after authorization   Injection scheduling note    Labs    Imaging    Pharmacy appointment    Other referrals                    Encounter: 30 min total time spent including time  Preparing to see the patient   Obtaining and/or reviewing separately obtained history   Performing a medically appropriate examination and/or evaluation   Counseling and educating the patient/caregiver  Documenting clinical information in the EMR       Abdirahman Martinez MD  Hematology & Oncology Fellow, PGY-V  [facility]

## 2025-06-08 ENCOUNTER — PATIENT MESSAGE (OUTPATIENT)
Dept: GASTROENTEROLOGY | Facility: CLINIC | Age: 65
End: 2025-06-08
Payer: COMMERCIAL

## 2025-06-08 ENCOUNTER — PATIENT MESSAGE (OUTPATIENT)
Dept: RHEUMATOLOGY | Facility: CLINIC | Age: 65
End: 2025-06-08
Payer: COMMERCIAL

## 2025-06-09 ENCOUNTER — TELEPHONE (OUTPATIENT)
Dept: GASTROENTEROLOGY | Facility: CLINIC | Age: 65
End: 2025-06-09
Payer: COMMERCIAL

## 2025-06-09 DIAGNOSIS — R11.0 NAUSEA: Primary | ICD-10-CM

## 2025-06-09 RX ORDER — ONDANSETRON 4 MG/1
8 TABLET, ORALLY DISINTEGRATING ORAL EVERY 8 HOURS PRN
Qty: 30 TABLET | Refills: 0 | Status: SHIPPED | OUTPATIENT
Start: 2025-06-09

## 2025-06-09 NOTE — TELEPHONE ENCOUNTER
Care Due:                  Date            Visit Type   Department     Provider  --------------------------------------------------------------------------------                                ESTABLISHED                              PATIENT -    [facility] INTERNAL  Last Visit: 05-      [facility]      NIMISHA Rodriguez  Next Visit: None Scheduled  None         None Found                                                            Last  Test          Frequency    Reason                     Performed    Due Date  --------------------------------------------------------------------------------    HBA1C.......  6 months...  metFORMIN................  06- 12-    TSH.........  12 months..  levothyroxine............  06- 06-    [facility] Embedded Care Due Messages. Reference number: [id number].   6/09/2025 1:36:14 PM CDT

## 2025-06-09 NOTE — TELEPHONE ENCOUNTER
Copied from CRM #[id number]. Topic: Medications - Medication Refill  >> Jun 9, 2025 12:58 PM Kizzy wrote:               Is this a Refill or New Rx: Refill         Rx Name and Strength:ondansetron (ZOFRAN-ODT) 8 MG TbDL  and a Transdermal Patch (behind ear for nausea)          Preferred Pharmacy with phone number:   [id number] - [place] - [address] AT [facility] [address] [facility]  [address]  [address] [address]  [place] [address]  Phone: [phone] Fax: [phone]           Communication Preference: [phone] Pt's  Henrique

## 2025-06-09 NOTE — TELEPHONE ENCOUNTER
Refill Encounter    PCP Visits: Recent Visits  Date Type Provider Dept   05/05/25 Office Visit Andrew Rodriguez MD [facility] Internal Medicine   12/26/24 Office Visit Arturo Joseph PA-C [facility] Internal Medicine   12/03/24 Office Visit Luis Rojas MD [facility] Internal Medicine   11/14/24 Office Visit Andrew Rodriguez MD [facility] Internal Medicine   08/23/24 Office Visit Andrew Rodriguez MD [facility] Internal Medicine   Showing recent visits within past 360 days and meeting all other requirements  Future Appointments  No visits were found meeting these conditions.  Showing future appointments within next 720 days and meeting all other requirements      Last 3 Blood Pressure:   BP Readings from Last 3 Encounters:   06/05/25 133/61   06/02/25 133/67   05/08/25 126/60     Preferred Pharmacy:   [facility] #[id number] - [place] - [address] AT [facility] [address] [facility]  [address]  [facility]  [place] [address]  Phone: [phone] Fax: [phone]    [facility] [address] [place] [address]  Phone: [phone] Fax: [phone]    Requested RX:  Requested Prescriptions     Pending Prescriptions Disp Refills    ondansetron (ZOFRAN-ODT) 4 MG TbDL 30 tablet 0     Sig: Take 2 tablets (8 mg total) by mouth every 8 (eight) hours as needed (prn).      RX Route: Normal

## 2025-06-09 NOTE — TELEPHONE ENCOUNTER
Copied from CRM #[id number]. Topic: Medications - Medication Refill  >> Jun 9, 2025 10:05 AM Susan wrote:  Is this a Refill or New Rx:Refill      Rx Name and Strength:8mg ondansetron (Zofran) and scopolamine (TRANSDERM-SCOP) 1.3-1.5 mg (1 mg over 3 days)      Preferred Pharmacy with phone number:  [id number] - [place] - [address] AT [facility] [address] & [facility]  [address]  [facility] [address]  [place] [address]  Phone: [phone] Fax: [phone]         Communication Preference:[phone] (work)       Additional Information:   Pt is having bad nausea in need of refill for both. Please call to refill.

## 2025-06-10 ENCOUNTER — TELEPHONE (OUTPATIENT)
Dept: INTERNAL MEDICINE | Facility: CLINIC | Age: 65
End: 2025-06-10
Payer: COMMERCIAL

## 2025-06-10 RX ORDER — SCOPOLAMINE 1 MG/3D
1 PATCH, EXTENDED RELEASE TRANSDERMAL
Qty: 7 PATCH | Refills: 2 | Status: SHIPPED | OUTPATIENT
Start: 2025-06-10

## 2025-06-10 NOTE — TELEPHONE ENCOUNTER
Returned patients call about her medication Zofran. No answer left voice message to call the office

## 2025-06-11 ENCOUNTER — PATIENT MESSAGE (OUTPATIENT)
Dept: INTERNAL MEDICINE | Facility: CLINIC | Age: 65
End: 2025-06-11
Payer: COMMERCIAL

## 2025-06-11 DIAGNOSIS — E03.9 HYPOTHYROIDISM, UNSPECIFIED TYPE: Primary | ICD-10-CM

## 2025-06-11 NOTE — TELEPHONE ENCOUNTER
Refill Encounter    PCP Visits: Recent Visits  Date Type Provider Dept   05/05/25 Office Visit Andrew Rodriguez MD [facility] Internal Medicine   12/26/24 Office Visit Arturo Joseph PA-C [facility] Internal Medicine   12/03/24 Office Visit Luis Rojas MD [facility] Internal Medicine   11/14/24 Office Visit Andrew Rodriguez MD [facility] Internal Medicine   08/23/24 Office Visit Andrew Rodriguez MD [facility] Internal Medicine   Showing recent visits within past 360 days and meeting all other requirements  Future Appointments  No visits were found meeting these conditions.  Showing future appointments within next 720 days and meeting all other requirements      Last 3 Blood Pressure:   BP Readings from Last 3 Encounters:   06/05/25 133/61   06/02/25 133/67   05/08/25 126/60     Preferred Pharmacy:   [facility] #[id number] - [place] - [address] [facility] [address] & [facility]  [address]  [facility]  [place] [address]  Phone: [phone] Fax: [phone]    [facility] [address] [place] [address]  Phone: [phone] Fax: [phone]    Requested RX:  Requested Prescriptions     Pending Prescriptions Disp Refills    levothyroxine (SYNTHROID) 75 MCG tablet 90 tablet 1     Sig: Take 1 tablet (75 mcg total) by mouth before breakfast.      RX Route: Normal

## 2025-06-11 NOTE — TELEPHONE ENCOUNTER
No care due was identified.  [facility] Embedded Care Due Messages. Reference number: [id number].   6/11/2025 3:53:17 PM CDT

## 2025-06-12 RX ORDER — LEVOTHYROXINE SODIUM 75 UG/1
75 TABLET ORAL
Qty: 90 TABLET | Refills: 1 | Status: SHIPPED | OUTPATIENT
Start: 2025-06-12

## 2025-06-14 ENCOUNTER — PATIENT MESSAGE (OUTPATIENT)
Dept: ADMINISTRATIVE | Facility: HOSPITAL | Age: 65
End: 2025-06-14
Payer: COMMERCIAL

## 2025-06-14 NOTE — PROGRESS NOTES
Subjective:       Patient ID: Earl Abdul is a 65 y.o. female.    Chief Complaint: No chief complaint on file.    HPI  History of Present Illness    CHIEF COMPLAINT:  Earl presents today with persistent nausea and dizziness    HISTORY OF PRESENT ILLNESS:  She experiences daily nausea with minimal relief from Zofran taken multiple times per day and Scopolamine patch behind ear. She also reports daily dizziness that persists regardless of position (sitting, standing, or lying down). She reports a new facial lump in gland area present for a few weeks, denying associated tooth pain, jaw pain, sore throat, pain at the site, or recent trauma to the area.    MEDICAL HISTORY:  She has a history of sarcoidosis and chronic lymphocytic leukemia (CLL). Previous endoscopy and colonoscopy were performed as part of GI workup. ENT evaluation showed no evidence of labyrinthitis or other inner ear pathology.    MEDICATIONS:  She takes magnesium, hydroxyzine, and gabapentin 300mg twice daily for body pains. She cannot tolerate oral iron supplements due to stomach issues.    SOCIAL HISTORY:  She has maintained sobriety for several months with only rare, isolated instances of alcohol consumption.    LABS AND IMAGING:  Brain MRI WO Contrast showed stable chronic microvascular changes. Iron levels were low at 15 in February. Hepatitis B antibody test was abnormal, though surface antigen and surface antibody are normal, suggesting possible past exposure or cross-reactivity rather than active infection.      ROS:  General: -fever, -chills, -fatigue, -weight gain, -weight loss  Eyes: -vision changes, -redness, -discharge  ENT: -ear pain, -nasal congestion, -sore throat  Cardiovascular: -chest pain, -palpitations, -lower extremity edema  Respiratory: -cough, -shortness of breath  Gastrointestinal: -abdominal pain, +nausea, -vomiting, -diarrhea, -constipation, -blood in stool  Genitourinary: -dysuria, -hematuria,  -frequency  Musculoskeletal: -joint pain, -muscle pain, +body aches  Skin: -rash, -lesion  Neurological: -headache, +dizziness, -numbness, -tingling  Psychiatric: -anxiety, -depression, -sleep difficulty  Head: +facial swelling         Review of Systems   Constitutional:  Negative for activity change and unexpected weight change.   HENT:  Negative for hearing loss, rhinorrhea and trouble swallowing.    Eyes:  Negative for discharge and visual disturbance.   Respiratory:  Negative for chest tightness and wheezing.    Cardiovascular:  Negative for chest pain and palpitations.   Gastrointestinal:  Negative for blood in stool, constipation, diarrhea and vomiting.   Endocrine: Negative for polydipsia and polyuria.   Genitourinary:  Negative for difficulty urinating, dysuria, hematuria and menstrual problem.   Musculoskeletal:  Positive for arthralgias. Negative for joint swelling and neck pain.   Neurological:  Positive for weakness and headaches.   Psychiatric/Behavioral:  Positive for dysphoric mood. Negative for confusion.        Objective:      There were no vitals filed for this visit.   Physical Exam  Constitutional:       General: She is not in acute distress.     Appearance: Normal appearance. She is well-developed. She is not diaphoretic.   HENT:      Head: Normocephalic and atraumatic.   Eyes:      General: No scleral icterus.        Right eye: No discharge.         Left eye: No discharge.      Conjunctiva/sclera: Conjunctivae normal.   Neck:      Thyroid: No thyromegaly.   Pulmonary:      Effort: Pulmonary effort is normal. No tachypnea, accessory muscle usage or respiratory distress.      Breath sounds: Normal breath sounds.   Abdominal:      General: There is no distension.   Skin:     General: Skin is warm and dry.      Coloration: Skin is not pale.   Neurological:      Mental Status: She is alert and oriented to person, place, and time.   Psychiatric:         Speech: Speech normal.         Behavior:  Behavior normal.         Assessment:       1. Restless leg    2. Alcohol use disorder, severe, dependence    3. Microcytosis    4. Hypothyroidism, unspecified type    5. Essential hypertension    6. VINICIO (obstructive sleep apnea)    7. Atrial fibrillation with RVR    8. CLL (chronic lymphocytic leukemia)    9. Hypomagnesemia    10. Iron deficiency    11. Localized enlarged lymph nodes        Plan:       Diagnoses and all orders for this visit:    Restless leg    Alcohol use disorder, severe, dependence    Microcytosis  -     Ferritin; Future    Hypothyroidism, unspecified type    Essential hypertension    VINICIO (obstructive sleep apnea)    Atrial fibrillation with RVR    CLL (chronic lymphocytic leukemia)    Hypomagnesemia  -     Magnesium; Future    Iron deficiency  -     Ferritin; Future    Localized enlarged lymph nodes  -     CT Soft Tissue Neck With Contrast; Future           Assessment & Plan    CHRONIC LYMPHOCYTIC LEUKEMIA (CLL):  - Evaluated facial lump; given history of CLL, recommended CT Neck for further investigation.    SARCOIDOSIS AND ANEMIA:  - Earl has anemia due to sarcoidosis.  - Sed rate and CRP are normal, indicating no active inflammation from sarcoidosis.  - CBC shows abnormal but stable levels overall.  - Determined anemia is likely due to chronic disease; current hemoglobin levels not expected to cause reported symptoms.  - Referred the patient to hematology for IV iron infusions due to iron deficiency anemia.    CEREBROVASCULAR DISEASE:  - MRI shows stable chronic microvascular changes.    DIZZINESS AND GIDDINESS:  - Earl experiences daily dizziness in various positions.  - ENT ruled out labyrinthitis as cause of dizziness.  - Considered potential causes for persistent nausea and dizziness, including neurological issues, possible Alzheimer's/dementia, and medication side effects.  - Discussed potential side effects of Gabapentin, including altered memory, sedation, dizziness, and  sluggishness.  - Decreased Gabapentin to 300 mg daily for 1-2 weeks.  - Scheduled follow-up with neurologist to investigate causes of dizziness.    LOCALIZED SWELLING, MASS AND LUMP, HEAD:  - Earl has a swollen gland on the side of the face for a few weeks.  - Ordered CT for imaging the neck due to swelling.  - Planned to schedule an appointment if swelling does not resolve.    ABNORMAL GLUCOSE:  - Glucose levels are slightly elevated.  - Considered checking hemoglobin A1C for further evaluation.    ALCOHOL DEPENDENCE:  - Earl has been mostly abstinent from alcohol with occasional slip-ups.    HEPATITIS B:  - Hepatitis B surface antigen and antibody are normal.  - Assessed abnormal hepatitis B antibody result, determined likely due to cross-reactivity and not indicative of active infection.       Visit today is associated with current or anticipated ongoing medical care related to this patient's single serious condition/complex condition of dementia. The patient will return to see me as these issues will be followed longitudinally.    This note was generated with the assistance of ambient listening technology. Verbal consent was obtained by the patient and accompanying visitor(s) for the recording of patient appointment to facilitate this note. I attest to having reviewed and edited the generated note for accuracy, though some syntax or spelling errors may persist. Please contact the author of this note for any clarification.      Andrew Chase MD  Internal Medicine-[facility]        Side effects of medication(s) were discussed in detail and patient voiced understanding.  Patient will call back for any issues or complications.

## 2025-06-17 DIAGNOSIS — C91.10 CHRONIC LYMPHOCYTIC LEUKEMIA: Primary | ICD-10-CM

## 2025-06-19 DIAGNOSIS — E03.9 HYPOTHYROIDISM, UNSPECIFIED TYPE: ICD-10-CM

## 2025-06-19 RX ORDER — LEVOTHYROXINE SODIUM 75 UG/1
75 TABLET ORAL
Qty: 90 TABLET | Refills: 1 | Status: SHIPPED | OUTPATIENT
Start: 2025-06-19

## 2025-06-19 NOTE — TELEPHONE ENCOUNTER
Copied from CRM #[id number]. Topic: Medications - Medication Refill  >> Jun 19, 2025 10:03 AM Tez wrote:  Refill Request: levothyroxine (SYNTHROID) 75 MCG tablet//Stated that the patient is currently out of medication and needs to have the refills sent to the pharmacy. Please reach out to the pharmacy.     Preferred Pharmacy: .  [id number] - [place] - [address] AT [facility] [address] & [facility]  [address]  [facility]  [place] [address]  Phone: [phone] Fax: [phone]    Patient's Callback: .[phone]

## 2025-06-19 NOTE — TELEPHONE ENCOUNTER
No care due was identified.  Health [facility] Embedded Care Due Messages. Reference number: [id number].   6/19/2025 10:19:47 AM CDT   Nursing Notes    Patient presents to the office today for trigger point injections with Dr. Jenny Babinski for her chronic pain. Patient rates her pain at 8/10 on the numerical pain scale.  reviewed NO  Any aberrancies noted on  NO  Last opioid agreement N/A  Last urine drug screen N/A    Comments:     POC UDS was not performed in office today    Any new labs or imaging since last appointment? YES. Pt had some labs recently done. Results in chart. Have you been to an emergency room (ER) or urgent care clinic since your last visit? YES. Pt states that she went to an urgent care about 2 weeks ago for her chronic back and hip pain. Have you been hospitalized since your last visit? NO     If yes, where, when, and reason for visit? Have you seen or consulted any other health care providers outside of the [address] [place] [address]  since your last visit? NO     If yes, where, when, and reason for visit? Ms. Adalberto Finnegan has a reminder for a \"due or due soon\" health maintenance. I have asked that she contact her primary care provider for follow-up on this health maintenance.     3 most recent PHQ Screens 6/7/2019   Little interest or pleasure in doing things Not at all   Feeling down, depressed, irritable, or hopeless Not at all   Total Score PHQ 2 0   Trouble falling or staying asleep, or sleeping too much Not at all   Feeling tired or having little energy Not at all   Poor appetite, weight loss, or overeating Not at all   Feeling bad about yourself - or that you are a failure or have let yourself or your family down Not at all   Trouble concentrating on things such as school, work, reading, or watching TV Not at all   Moving or speaking so slowly that other people could have noticed; or the opposite being so fidgety that others notice Not at all   Thoughts of being better off dead, or hurting yourself in some way Not at all   PHQ 9 Score 0   How difficult have these problems made it for you to do your work, take care of your home and get along with others Not difficult at all     Abuse Screening Questionnaire 6/7/2019   Do you ever feel afraid of your partner? N   Are you in a relationship with someone who physically or mentally threatens you? N   Is it safe for you to go home?  Arthur Swain

## 2025-06-19 NOTE — TELEPHONE ENCOUNTER
Medication pended  Allergies Review  Lov 05/05/2025      Refill Encounter    PCP Visits: Recent Visits  Date Type Provider Dept   05/05/25 Office Visit Andrew Rodriguez MD [facility] Internal Medicine   12/26/24 Office Visit Arturo Joseph PA-C [facility] Internal Medicine   12/03/24 Office Visit Luis Rojas MD [facility] Internal Medicine   11/14/24 Office Visit Andrew Rodriguez MD [facility] Medicine   08/23/24 Office Visit Andrew Rodriguez MD [facility] Internal Medicine   Showing recent visits within past 360 days and meeting all other requirements  Future Appointments  No visits were found meeting these conditions.  Showing future appointments within next 720 days and meeting all other requirements      Last 3 Blood Pressure:   BP Readings from Last 3 Encounters:   06/05/25 133/61   06/02/25 133/67   05/08/25 126/60     Preferred Pharmacy:   [id number] - [place] - [address] [facility] [address] [facility]  [address]  [facility] [place] [address]  Phone: [phone] Fax: [phone]    Requested RX:  Requested Prescriptions      No prescriptions requested or ordered in this encounter      RX Route: Normal

## 2025-07-02 ENCOUNTER — TELEPHONE (OUTPATIENT)
Dept: HEMATOLOGY/ONCOLOGY | Facility: CLINIC | Age: 65
End: 2025-07-02
Payer: COMMERCIAL

## 2025-07-02 NOTE — TELEPHONE ENCOUNTER
Returned phone call to pt's caregiver Jess. Jess stated she had received a letter from [facility] dated 6/20/2025 that stated the iron infusions were denied and deemed not medically necessary. Advised pt's caregiver that we did receive an approval for the iron infusions on 6/30 and to disregard the letter that they received. Pt's caregiver verbalized understanding and confirmed both infusion appts.

## 2025-07-02 NOTE — TELEPHONE ENCOUNTER
"Copied from CRM #[id number]. Topic: General Inquiry - Patient Advice  >> Jul 2, 2025  1:41 PM Kaden wrote:  Consult/Advisory    Name Of Caller: Jess [Caregiver]    Contact Preference?: [phone]    Provider Name: Ana    Does patient feel the need to be seen today? No    What is the nature of the call?: Calling to discuss auth issues for 7/17 and 7/24 infusions. Requesting proof that pt's chemo is "medically necessary"    Additional Notes:  "Thank you for all that you do for our patients"  "

## 2025-07-13 DIAGNOSIS — E83.42 HYPOMAGNESEMIA: Primary | ICD-10-CM

## 2025-07-13 NOTE — TELEPHONE ENCOUNTER
No care due was identified.  [facility] Embedded Care Due Messages. Reference number: [id number].   7/13/2025 11:09:05 AM CDT

## 2025-07-14 RX ORDER — LANOLIN ALCOHOL/MO/W.PET/CERES
1 CREAM (GRAM) TOPICAL EVERY MORNING
Qty: 90 TABLET | Refills: 2 | Status: SHIPPED | OUTPATIENT
Start: 2025-07-14

## 2025-07-14 NOTE — TELEPHONE ENCOUNTER
Refill Encounter    PCP Visits: Recent Visits  Date Type Provider Dept   05/05/25 Office Visit Andrew Rodriguez MD [facility] Internal Medicine   11/14/24 Office Visit Andrew Rodriguez MD [facility] Medicine   08/23/24 Office Visit Andrew Rodriguez MD [facility] Medicine   Showing recent visits within past 360 days and meeting all other requirements  Future Appointments  No visits were found meeting these conditions.  Showing future appointments within next 720 days and meeting all other requirements     Last 3 Blood Pressure:   BP Readings from Last 3 Encounters:   06/05/25 133/61   06/02/25 133/67   05/08/25 126/60     Preferred Pharmacy:   [id number] - [place] - [address] [facility] [address] [facility]  [address]  [facility] [place] [address]  Phone: [phone] Fax: [phone]    Requested RX:  Requested Prescriptions     Pending Prescriptions Disp Refills    magnesium oxide (MAG-OX) 400 mg (241.3 mg magnesium) tablet 90 tablet 0     Sig: Take 1 tablet (400 mg total) by mouth every morning.      RX Route: Normal

## 2025-07-16 ENCOUNTER — OFFICE VISIT (OUTPATIENT)
Dept: RHEUMATOLOGY | Facility: CLINIC | Age: 65
End: 2025-07-16
Payer: COMMERCIAL

## 2025-07-16 DIAGNOSIS — D86.9 SARCOIDOSIS: Primary | ICD-10-CM

## 2025-07-16 DIAGNOSIS — D84.821 DRUG-INDUCED IMMUNODEFICIENCY: ICD-10-CM

## 2025-07-16 DIAGNOSIS — Z71.89 COUNSELING AND COORDINATION OF CARE: ICD-10-CM

## 2025-07-16 DIAGNOSIS — Z79.899 DRUG-INDUCED IMMUNODEFICIENCY: ICD-10-CM

## 2025-07-16 DIAGNOSIS — M25.50 ARTHRALGIA, UNSPECIFIED JOINT: ICD-10-CM

## 2025-07-16 DIAGNOSIS — M06.00 SERONEGATIVE RHEUMATOID ARTHRITIS: ICD-10-CM

## 2025-07-16 PROCEDURE — 98006 SYNCH AUDIO-VIDEO EST MOD 30: CPT | Mod: 95,,, | Performed by: STUDENT IN AN ORGANIZED HEALTH CARE EDUCATION/TRAINING PROGRAM

## 2025-07-16 PROCEDURE — 1159F MED LIST DOCD IN RCRD: CPT | Mod: CPTII,95,, | Performed by: STUDENT IN AN ORGANIZED HEALTH CARE EDUCATION/TRAINING PROGRAM

## 2025-07-16 PROCEDURE — G2211 COMPLEX E/M VISIT ADD ON: HCPCS | Mod: 95,,, | Performed by: STUDENT IN AN ORGANIZED HEALTH CARE EDUCATION/TRAINING PROGRAM

## 2025-07-16 PROCEDURE — 1160F RVW MEDS BY RX/DR IN RCRD: CPT | Mod: CPTII,95,, | Performed by: STUDENT IN AN ORGANIZED HEALTH CARE EDUCATION/TRAINING PROGRAM

## 2025-07-16 RX ORDER — ZOSTER VACCINE RECOMBINANT, ADJUVANTED 50 MCG/0.5
0.5 KIT INTRAMUSCULAR ONCE
Qty: 1 EACH | Refills: 0 | Status: SHIPPED | OUTPATIENT
Start: 2025-07-16 | End: 2025-07-16

## 2025-07-16 NOTE — PROGRESS NOTES
The patient location is: [place]  The chief complaint leading to consultation is: follow up    Visit type: audiovisual    Face to Face time with patient: 15 minutes  30 minutes of total time spent on the encounter, which includes face to face time and non-face to face time preparing to see the patient (eg, review of tests), Obtaining and/or reviewing separately obtained history, Documenting clinical information in the electronic or other health record, Independently interpreting results (not separately reported) and communicating results to the patient/family/caregiver, or Care coordination (not separately reported).         Each patient to whom he or she provides medical services by telemedicine is:  (1) informed of the relationship between the physician and patient and the respective role of any other health care provider with respect to management of the patient; and (2) notified that he or she may decline to receive medical services by telemedicine and may withdraw from such care at any time.    Notes:               The patient location is: [place]  The chief complaint leading to consultation is: follow up    Visit type: audiovisual    Face to Face time with patient: 15 minutes   30 minutes of total time spent on the encounter, which includes face to face time and non-face to face time preparing to see the patient (eg, review of tests), Obtaining and/or reviewing separately obtained history, Documenting clinical information in the electronic or other health record, Independently interpreting results (not separately reported) and communicating results to the patient/family/caregiver, or Care coordination (not separately reported).         Each patient to whom he or she provides medical services by telemedicine is:  (1) informed of the relationship between the physician and patient and the respective role of any other health care provider with respect to management of the patient; and (2) notified that he or  she may decline to receive medical services by telemedicine and may withdraw from such care at any time.    Notes:     Answers submitted by the patient for this visit:  Rheumatology Questionnaire (Submitted on 12/12/2024)  fever: No  eye redness: No  mouth sores: No  headaches: Yes  shortness of breath: No  chest pain: No  trouble swallowing: No  diarrhea: No  constipation: No     RHEUMATOLOGY OUTPATIENT CLINIC NOTE    7/16/2025    Attending Rheumatologist: Josh Connelly  Primary Care Provider: Andrew Rodriguez MD   Physician Requesting Consultation: No referring provider defined for this encounter.  Chief Complaint/Reason For Consultation:  No chief complaint on file.      Subjective:       HPI  Earl Abdul is a 65 y.o. White female with pmhx noted below referred for   Crohn's //CLL (once a year)  Sarcoidosis   No flare uo in yrs  4/23/2025:Patient here for follow up   She has had a significant mental decline   She also is having joint pains, fatigue, SOB      5/29/2025: Patient here for follow up   Patient back from [facility] last night   Think she might be catching a cold  We were thinking starting medication for sarcoidosis but patient with a diagnosis of CLL and some of the labwork abnormality and lymphadenopathy can be 2.2 to CLL     07/16/2025 patient here for follow-up of seronegative rheumatoid arthritis and sarcoidosis   Patient reports she has been having a lot of pain and swelling in her hands and feet, she hurts all the time, worse in the morning with prolonged morning stiffness.  She is very discouraged because he does not want to live her life in pain all the time and swelling.      Review of Systems   Constitutional:  Negative for fever and unexpected weight change.   HENT:  Negative for mouth sores and trouble swallowing.    Eyes:  Negative for redness.   Respiratory:  Negative for cough and shortness of breath.    Cardiovascular:  Negative for chest pain.   Gastrointestinal:  Negative for  constipation and diarrhea.   Genitourinary:  Negative for dysuria and genital sores.   Integumentary:  Negative for rash.   Neurological:  Positive for headaches.   Hematological:  Does not bruise/bleed easily.        Chronic comorbid conditions affecting medical decision making today:  Past Medical History:   Diagnosis Date    Alcoholism     c/b alcohol withdrawl seizures 7/2017    Anemia     Aortic atherosclerosis 04/17/2024    C. difficile colitis     Cancer of breast 10/2020    s/p bilateral mastectomy for  T1b N0 stage IA breast cancer October 2020    CLL (chronic lymphocytic leukemia) 09/30/2022 6/22/22 - PB flow cytometry  Immunophenotyping of peripheral blood detects a distinct kappa light chain restricted monoclonal B-cell population  (calculated at 2.44x10 9 /L, from the most recent CBC showing a total WBC of  7.35 K/uL with 61% total lymphocytes)  with a CLL phenotype (coexpression of CD19, CD5, CD23 and dim CD20). CD22 (FITC), FMC-7 and CD38 are negative in this population.    Controlled type 2 diabetes mellitus without complication, without long-term current use of insulin 11/30/2021    COPD (chronic obstructive pulmonary disease)     Depression     Diverticulitis     Fatty liver     GERD (gastroesophageal reflux disease)     Hyperlipidemia     Hypertension     Pancreatitis     Peptic ulcer disease     Polysubstance abuse     Posterior reversible encephalopathy syndrome     Sarcoidosis of lung     over 30 yrs ago    Seizures     last seizure 2 years ago    Suicide attempt      Past Surgical History:   Procedure Laterality Date    APPENDECTOMY      BILATERAL MASTECTOMY Bilateral 10/29/2020    Procedure: MASTECTOMY, BILATERAL;  Surgeon: Baylee Kevin MD;  Location: [facility] OR [address] [place] [address];  Service: General;  Laterality: Bilateral;    BREAST REVISION SURGERY Bilateral 2/11/2021    Procedure: BREAST REVISION SURGERY;  Surgeon: Scottie Johnson MD;  Location: [facility] OR [address] [place] [address];  Service: Plastics;   Laterality: Bilateral;    COLONOSCOPY N/A 7/28/2017    Procedure: COLONOSCOPY;  Surgeon: Aaron Alvarado MD;  Location: [facility];  Service: Endoscopy;  Laterality: N/A;    COLONOSCOPY, SCREENING, HIGH RISK PATIENT N/A 1/9/2025    Procedure: COLONOSCOPY, SCREENING, HIGH RISK PATIENT;  Surgeon: Aayush Valente MD;  Location: [facility] (2ND FLR);  Service: Endoscopy;  Laterality: N/A;    ESOPHAGOGASTRODUODENOSCOPY  10/7/2016, 11/6/2014    2016 - gastritis, duodenitis, 2014 erosive gastritis    ESOPHAGOGASTRODUODENOSCOPY N/A 2/11/2020    Procedure: ESOPHAGOGASTRODUODENOSCOPY (EGD);  Surgeon: Fawn Garrido MD;  Location: [facility];  Service: Endoscopy;  Laterality: N/A;    ESOPHAGOGASTRODUODENOSCOPY N/A 4/19/2021    Procedure: EGD (ESOPHAGOGASTRODUODENOSCOPY);  Surgeon: Paramjit Martino MD;  Location: [facility];  Service: Endoscopy;  Laterality: N/A;    ESOPHAGOGASTRODUODENOSCOPY N/A 1/9/2025    Procedure: EGD (ESOPHAGOGASTRODUODENOSCOPY);  Surgeon: Aayush Valente MD;  Location: [facility] (2ND FLR);  Service: Endoscopy;  Laterality: N/A;  referral dr santiago/ prep inst given in office/ pt requested sutabs/diabetic/eliquis  11/12 Precall performed. Pt needs rx called and instructions resent to [facility]  11/13/24- Rx and instructions sent as requested. DBM  11/14 pt r/s, Ok to hold Eliqui    FLEXIBLE SIGMOIDOSCOPY  11/06/2014    colitis    HYSTERECTOMY      IMPLANTATION OF PERMANENT SACRAL NERVE STIMULATOR N/A 7/12/2022    Procedure: INSERTION, NEUROSTIMULATOR, PERMANENT, SACRAL;  Surgeon: Juaquin Edwards MD;  [facility];  Service: Urology;  Laterality: N/A;  1hr    INJECTION FOR SENTINEL NODE IDENTIFICATION Right 10/29/2020    Procedure: INJECTION, FOR SENTINEL NODE IDENTIFICATION;  Surgeon: Baylee Kevin MD;  [facility];  Service: General;  Laterality: Right;    INJECTION OF JOINT Right 10/10/2019    Procedure: Injection, Joint RIGHT ILIOPSOAS BURSA/TENDON INJECTION AND  RIGHT GLUTEAL TENDON INJECTION WITH STEROID AND LIDOCAINE;  Surgeon: Guillaume Rico MD;  Location: [facility] PAIN MGT;  Service: Pain Management;  Laterality: Right;  NEEDS CONSENT    INSERTION OF BREAST TISSUE EXPANDER Bilateral 10/29/2020    Procedure: INSERTION, TISSUE EXPANDER, BREAST;  Surgeon: Scottie Johnson MD;  Location: [address];  Service: Plastics;  Laterality: Bilateral;  Right breast: 1082 g  Left breast: 1076 g    LIPOSUCTION Bilateral 2021    Procedure: LIPOSUCTION;  Surgeon: Scottie Johnson MD;  Location: [facility] OR [address] [place] [address];  Service: Plastics;  Laterality: Bilateral;    mediastenoscopy      REPLACEMENT OF IMPLANT OF BREAST Bilateral 2021    Procedure: REPLACEMENT, IMPLANT, BREAST;  Surgeon: Scottie Johnson MD;  Location: [address];  Service: Plastics;  Laterality: Bilateral;    SENTINEL LYMPH NODE BIOPSY Right 10/29/2020    Procedure: BIOPSY, LYMPH NODE, SENTINEL;  Surgeon: Baylee Kevin MD;  Location: [address];  Service: General;  Laterality: Right;    TONSILLECTOMY N/A     TUBAL LIGATION       Family History   Problem Relation Name Age of Onset    Heart attack Father      Diabetes Father      Hypertension Father      Diabetes Mother      Hypertension Mother      Breast cancer Maternal Aunt      Colon cancer Maternal Uncle      Breast cancer Daughter      Ovarian cancer Neg Hx      Cancer Neg Hx       Social History     Substance and Sexual Activity   Alcohol Use Not Currently    Comment: vodka daily (half a regular bottle) for 4 days     Social History     Tobacco Use   Smoking Status Every Day    Current packs/day: 0.00    Average packs/day: 0.5 packs/day for 30.0 years (15.0 ttl pk-yrs)    Types: Vaping with nicotine, Cigarettes    Start date: 1991    Last attempt to quit: 2021    Years since quittin.4   Smokeless Tobacco Never   Tobacco Comments    Patient is currently smoking 10 cigarettes a day, declines nicotine patches     Social  History     Substance and Sexual Activity   Drug Use Not Currently    Types: Marijuana    Comment: gummies       Current Outpatient Medications:     amitriptyline (ELAVIL) 25 MG tablet, Take 25-50 mg by mouth every evening., Disp: , Rfl:     apixaban (ELIQUIS) 5 mg Tab, Take 1 tablet by mouth 2 (two) times daily. (Patient not taking: Reported on 6/5/2025), Disp: , Rfl:     atogepant (QULIPTA) 60 mg Tab, Take 60 mg by mouth once daily. (Patient not taking: Reported on 5/5/2025), Disp: , Rfl:     cyanocobalamin (VITAMIN B-12) 1000 MCG tablet, Take 100 mcg by mouth once daily. Once every morning. (Patient not taking: Reported on 5/5/2025), Disp: , Rfl:     diclofenac sodium (VOLTAREN) 1 % Gel, Apply 2 g topically 4 (four) times daily. (Patient not taking: Reported on 5/5/2025), Disp: 100 g, Rfl: 11    ergocalciferol (ERGOCALCIFEROL) 50,000 unit Cap, Take 1 capsule by mouth every 7 days. (Patient not taking: Reported on 5/5/2025), Disp: , Rfl:     folic acid (FOLVITE) 1 MG tablet, Take 1 tablet (1 mg total) by mouth once daily. (Patient not taking: Reported on 6/5/2025), Disp: 30 tablet, Rfl: 0    gabapentin (NEURONTIN) 300 MG capsule, Take 1 capsule (300 mg total) by mouth 3 (three) times daily., Disp: 90 capsule, Rfl: 0    hydrOXYzine pamoate (VISTARIL) 50 MG Cap, Take 50 mg by mouth daily as needed., Disp: , Rfl:     lancets Misc, Use to check blood glucose 4 times daily (Patient not taking: Reported on 5/5/2025), Disp: 100 each, Rfl: 5    levothyroxine (SYNTHROID) 75 MCG tablet, Take 1 tablet (75 mcg total) by mouth before breakfast., Disp: 90 tablet, Rfl: 1    magnesium oxide (MAG-OX) 400 mg (241.3 mg magnesium) tablet, Take 1 tablet (400 mg total) by mouth every morning., Disp: 90 tablet, Rfl: 2    metFORMIN (GLUCOPHAGE-XR) 500 MG ER 24hr tablet, Take 1 tablet (500 mg total) by mouth once daily. (Patient not taking: Reported on 5/5/2025), Disp: 360 tablet, Rfl: 3    modafiniL (PROVIGIL) 100 MG Tab, Take 100 mg by  mouth every morning., Disp: , Rfl:     multivitamin Tab, Take 1 tablet by mouth once daily. (Patient not taking: Reported on 6/5/2025), Disp: 30 tablet, Rfl: 0    naltrexone (DEPADE) 50 mg tablet, Take 50 mg by mouth once daily., Disp: , Rfl:     naproxen (NAPROSYN) 500 MG tablet, Take 1 tablet (500 mg total) by mouth 2 (two) times a day. (Patient not taking: Reported on 6/5/2025), Disp: 30 tablet, Rfl: 0    omega-3 fatty acids 1,000 mg Cap, Take 1 capsule by mouth once daily., Disp: , Rfl:     omega-3 fatty acids/fish oil (FISH OIL-OMEGA-3 FATTY ACIDS) 300-1,000 mg capsule, Take 2 capsules by mouth once daily. Take mornings and nights., Disp: , Rfl:     omeprazole (PRILOSEC) 20 MG capsule, Take 1 capsule (20 mg total) by mouth before breakfast., Disp: 90 capsule, Rfl: 2    ondansetron (ZOFRAN-ODT) 4 MG TbDL, Take 2 tablets (8 mg total) by mouth every 8 (eight) hours as needed (prn)., Disp: 30 tablet, Rfl: 0    rimegepant (NURTEC) 75 mg odt, Take 1 tablet (75 mg total) by mouth every other day. Place ODT tablet on the tongue; alternatively the ODT tablet may be placed under the tongue, Disp: 15 tablet, Rfl: 1    risperiDONE (RISPERDAL) 0.25 MG Tab, Take 0.25 mg by mouth 2 (two) times daily., Disp: , Rfl:     scopolamine (TRANSDERM-SCOP) 1.3-1.5 mg (1 mg over 3 days), Place 1 patch onto the skin every 72 hours., Disp: 7 patch, Rfl: 2    traZODone (DESYREL) 300 MG tablet, Take 300 mg by mouth every evening., Disp: , Rfl:     TRINTELLIX 20 mg Tab, Take 1 tablet by mouth Daily., Disp: , Rfl:     upadacitinib (RINVOQ) 15 mg ER 24 hr tablet, Take 1 tablet (15 mg total) by mouth once daily., Disp: 30 tablet, Rfl: 11    varicella-zoster gE-AS01B, PF, (SHINGRIX, PF,) 50 mcg/0.5 mL injection, Inject 0.5 mLs into the muscle once. for 1 dose, Disp: 1 each, Rfl: 0  No current facility-administered medications for this visit.    Facility-Administered Medications Ordered in Other Visits:     albuterol sulfate nebulizer solution  2.5 mg, 2.5 mg, Nebulization, Once, Andrew Rodriguez MD     Objective:         There were no vitals filed for this visit.    Physical Exam   Constitutional: She is oriented to person, place, and time.   HENT:   Head: Normocephalic and atraumatic.   Right Ear: External ear normal.   Left Ear: External ear normal.   Nose: Nose normal.   Mouth/Throat: Oropharynx is clear and moist.   Eyes: Pupils are equal, round, and reactive to light. Conjunctivae are normal.   Cardiovascular: Normal rate and regular rhythm.   Pulmonary/Chest: Effort normal and breath sounds normal.   Abdominal: Soft. Bowel sounds are normal.   Musculoskeletal:         General: Tenderness present.      Cervical back: Normal range of motion and neck supple.      Comments: Multiple tender/trigger points    Neurological: She is alert and oriented to person, place, and time.   Skin: No rash noted. No erythema.   Psychiatric: Mood and affect normal.   5/27/2025:  Virtual    Reviewed old and all outside pertinent medical records available.    All lab results personally reviewed and interpreted by me.  Lab Results   Component Value Date    WBC 8.22 05/05/2025    HGB 10.2 (L) 05/05/2025    HCT 34.2 (L) 05/05/2025    MCV 83 05/05/2025    MCH 24.6 (L) 05/05/2025    MCHC 29.8 (L) 05/05/2025    RDW 15.3 (H) 05/05/2025    PLT 94 (L) 05/05/2025    MPV 10.8 05/05/2025    PLTEST Decreased (A) 02/13/2025       Lab Results   Component Value Date     05/05/2025    K 4.3 05/05/2025     05/05/2025    CO2 28 05/05/2025     (H) 05/05/2025    BUN 10 05/05/2025    CALCIUM 8.3 (L) 05/05/2025    PROT 6.4 05/05/2025    ALBUMIN 3.7 05/05/2025    BILITOT 0.3 05/05/2025    AST 17 05/05/2025    ALKPHOS 51 05/05/2025    ALT 16 05/05/2025       Lab Results   Component Value Date    COLORU Yellow 06/02/2025    APPEARANCEUA Clear 03/29/2025    SPECGRAV 1.020 06/02/2025    PHUR 6.0 06/02/2025    PROTEINUA Negative 03/29/2025    KETONESU Negative 06/02/2025     "LEUKOCYTESUR Negative 03/29/2025    NITRITE Negative 06/02/2025    UROBILINOGEN 0.2 06/02/2025       Lab Results   Component Value Date    CRP 5.8 05/05/2025       Lab Results   Component Value Date    SEDRATE 4 05/05/2025       Lab Results   Component Value Date    ROYCE Negative 02/11/2009    RF <13.0 12/18/2024    SEDRATE 4 05/05/2025       No components found for: "25OHVITDTOT", "94LGZLVA0", "13EISXIV3", "METHODNOTE"    Lab Results   Component Value Date    URICACID 8.7 (H) 10/15/2014       No components found for: "TSPOTTB"    Lab Results   Component Value Date    ROYCE Negative 02/11/2009        Imaging:  All imaging reviewed and independently interpreted by me.         ASSESSMENT / PLAN:     Earl Abdul is a 65 y.o. White female with:      1. Arthralgia, unspecified joint  - Ambulatory referral/consult to Rheumatology  - traMADoL (ULTRAM) 50 mg tablet; Take 1 tablet (50 mg total) by mouth every 4 (four) hours as needed for Pain.  Dispense: 28 tablet; Refill: 0  - Unclear if 2.2 to sarcoidosis- mix results with elevated IL-2, normal inflammatory markers, low ACE and low vitamin D normal calcitriol     2. Sarcoidosis (Primary)  Seronegative rheumatoid arthritis  - Unclear if 2.2 to sarcoidosis- mix results with elevated IL-2, normal inflammatory markers, low ACE and low vitamin D normal calcitriol   - Could IL-2 be elevated due to CLL history   - Since having joint pain, fatigue and sob with elevated IL-2 I would treat for sarcoidosis but she also has a hx of CLL which I think could cloud the IL-2 elevation   - spoke with oncologist and it is okay to try immunosuppression  - patient with significant phobia to needle, at this point we will order Rinvoq for seronegative rheumatoid arthritis    3. Counseling and coordination of care  - over 10 minutes spent regarding below topics:  - Immunization counseling done.  - Weight loss counseling done.  - Nutrition and exercise counseling.  - Limitation of alcohol " consumption.  - Regular exercise:  Aerobic and resistance.  - Medication counseling provided.    Follow up in about 4 months (around 11/16/2025).    Method of contact with patient concerns: [facility]    Disclaimer:  This note is prepared using voice recognition software and as such is likely to have errors and has not been proof read. Please contact me for questions.     Time spent: 30 minutes in face to face discussion concerning diagnosis, prognosis, review of lab and test results, benefits of treatment as well as management of disease, counseling of patient and coordination of care between various health care providers.  Greater than half the time spent was used for coordination of care and counseling of patient.    Josh Connelly M.D.  Rheumatology Department   [facility]   unexpected weight change: No  genital sore: No  dysuria: No  During the last 3 days, have you had a skin rash?: No  Bruises or bleeds easily: No  cough: No    Today's visit is associated with current or anticipated ongoing medical care related to this patient's diagnosis of sarcoidosis, which is a chronic disease which will require regular follow up to manage symptoms and progression. The patient is to return to the office within a minimum of 3-6 months to review symptoms and function at that time. CPT code

## 2025-07-17 ENCOUNTER — INFUSION (OUTPATIENT)
Dept: INFUSION THERAPY | Facility: HOSPITAL | Age: 65
End: 2025-07-17
Payer: COMMERCIAL

## 2025-07-17 VITALS
SYSTOLIC BLOOD PRESSURE: 158 MMHG | HEART RATE: 89 BPM | TEMPERATURE: 98 F | DIASTOLIC BLOOD PRESSURE: 72 MMHG | RESPIRATION RATE: 16 BRPM

## 2025-07-17 DIAGNOSIS — D50.9 IRON DEFICIENCY ANEMIA, UNSPECIFIED IRON DEFICIENCY ANEMIA TYPE: Primary | ICD-10-CM

## 2025-07-17 PROCEDURE — 25000003 PHARM REV CODE 250: Performed by: INTERNAL MEDICINE

## 2025-07-17 PROCEDURE — 63600175 PHARM REV CODE 636 W HCPCS: Mod: JZ,TB | Performed by: INTERNAL MEDICINE

## 2025-07-17 PROCEDURE — 96374 THER/PROPH/DIAG INJ IV PUSH: CPT

## 2025-07-17 RX ORDER — SODIUM CHLORIDE 0.9 % (FLUSH) 0.9 %
10 SYRINGE (ML) INJECTION
OUTPATIENT
Start: 2025-07-20

## 2025-07-17 RX ORDER — HEPARIN 100 UNIT/ML
500 SYRINGE INTRAVENOUS
OUTPATIENT
Start: 2025-07-20

## 2025-07-17 RX ORDER — EPINEPHRINE 0.3 MG/.3ML
0.3 INJECTION SUBCUTANEOUS ONCE AS NEEDED
OUTPATIENT
Start: 2025-07-20

## 2025-07-17 RX ORDER — HEPARIN 100 UNIT/ML
500 SYRINGE INTRAVENOUS
Status: DISCONTINUED | OUTPATIENT
Start: 2025-07-17 | End: 2025-07-17 | Stop reason: HOSPADM

## 2025-07-17 RX ORDER — EPINEPHRINE 0.3 MG/.3ML
0.3 INJECTION SUBCUTANEOUS ONCE AS NEEDED
Status: DISCONTINUED | OUTPATIENT
Start: 2025-07-17 | End: 2025-07-17 | Stop reason: HOSPADM

## 2025-07-17 RX ORDER — SODIUM CHLORIDE 0.9 % (FLUSH) 0.9 %
10 SYRINGE (ML) INJECTION
Status: DISCONTINUED | OUTPATIENT
Start: 2025-07-17 | End: 2025-07-17 | Stop reason: HOSPADM

## 2025-07-17 RX ADMIN — SODIUM CHLORIDE: 9 INJECTION, SOLUTION INTRAVENOUS at 01:07

## 2025-07-17 RX ADMIN — FERUMOXYTOL 510 MG: 510 INJECTION INTRAVENOUS at 01:07

## 2025-07-17 NOTE — PLAN OF CARE
Tolerated Fereheme well. Patient complained of nausea on arrival and requested something for nausea to be given. Secure chat sent to dr. Perez and nurse but no response. No complaints post 1 hour observation. Patient opted to go home and take her zofran instead of waiting. VSS.

## 2025-07-21 ENCOUNTER — PATIENT MESSAGE (OUTPATIENT)
Dept: HEMATOLOGY/ONCOLOGY | Facility: CLINIC | Age: 65
End: 2025-07-21
Payer: COMMERCIAL

## 2025-07-21 DIAGNOSIS — R11.0 NAUSEA: ICD-10-CM

## 2025-07-21 NOTE — TELEPHONE ENCOUNTER
Medication Pended  Allergies Review  Lov 05/05/2025    Refill Encounter    PCP Visits: Recent Visits  Date Type Provider Dept   05/05/25 Office Visit Andrew Rodriguez MD [facility]   12/26/24 Office Visit Arturo Joseph PA-C [facility]   12/03/24 Office Visit Luis Rojas MD [facility]   11/14/24 Office Visit Andrew Rodriguez MD [facility]   08/23/24 Office Visit Andrew Rodriguez MD [facility]   Showing recent visits within past 360 days and meeting all other requirements  Future Appointments  No visits were found meeting these conditions.  Showing future appointments within next 720 days and meeting all other requirements      Last 3 Blood Pressure:   BP Readings from Last 3 Encounters:   07/17/25 (!) 158/72   06/05/25 133/61   06/02/25 133/67     Preferred Pharmacy:   [id number] - [place] - [address] [facility] [address] [facility]  [address]  [facility]  [place] [address]  Phone: [phone] Fax: [phone]    Requested RX:  Requested Prescriptions     Pending Prescriptions Disp Refills    ondansetron (ZOFRAN-ODT) 4 MG TbDL 30 tablet 0     Sig: Take 2 tablets (8 mg total) by mouth every 8 (eight) hours as needed (prn).      RX Route: Normal

## 2025-07-21 NOTE — TELEPHONE ENCOUNTER
Care Due:                  Date            Visit Type   Department     Provider  --------------------------------------------------------------------------------                                ESTABLISHED                              PATIENT -    [facility]  Last Visit: 05-      [facility]      NIMISHA Rodriguez  Next Visit: None Scheduled  None         None Found                                                            Last  Test          Frequency    Reason                     Performed    Due Date  --------------------------------------------------------------------------------    Mg Level....  12 months..  magnesium................  08- 08-    [facility] Embedded Care Due Messages. Reference number: [id number].   7/21/2025 10:53:25 AM CDT

## 2025-07-21 NOTE — TELEPHONE ENCOUNTER
Summary   Medication refill request   Communication   Type:  RX Refill Request            Who Called: Pt's caregiver      Refill or New Rx:refill      RX Name and Strength:ondansetron (ZOFRAN-ODT) 4 MG TbDL      How is the patient currently taking it? (ex. 1XDay):2xday      Is this a 30 day or 90 day RX:30      Preferred Pharmacy with phone number:[id number] [place] - [address] [facility] [address] [facility]      Phone: [phone]      Fax: [phone]      Local or Mail Order:local      Ordering Provider:Michael      Would the patient rather a call back or a response via [facility]? Call back      Best Call Back Number:[phone]      Additional Information: Pt requesting that the tablets be 8mg because if caregiver isn't there she misses dosages. Also can the prescription have refills on it.

## 2025-07-22 DIAGNOSIS — C91.10 CHRONIC LYMPHOCYTIC LEUKEMIA: Primary | ICD-10-CM

## 2025-07-22 DIAGNOSIS — D50.9 IRON DEFICIENCY ANEMIA, UNSPECIFIED IRON DEFICIENCY ANEMIA TYPE: ICD-10-CM

## 2025-07-22 RX ORDER — ONDANSETRON 4 MG/1
8 TABLET, ORALLY DISINTEGRATING ORAL EVERY 8 HOURS PRN
Qty: 30 TABLET | Refills: 0 | Status: SHIPPED | OUTPATIENT
Start: 2025-07-22

## 2025-07-28 DIAGNOSIS — C91.10 CHRONIC LYMPHOCYTIC LEUKEMIA: Primary | ICD-10-CM

## 2025-07-29 ENCOUNTER — LAB VISIT (OUTPATIENT)
Dept: LAB | Facility: HOSPITAL | Age: 65
End: 2025-07-29
Attending: INTERNAL MEDICINE
Payer: COMMERCIAL

## 2025-07-29 DIAGNOSIS — C91.10 CHRONIC LYMPHOCYTIC LEUKEMIA: ICD-10-CM

## 2025-07-29 DIAGNOSIS — Z79.899 DRUG-INDUCED IMMUNODEFICIENCY: ICD-10-CM

## 2025-07-29 DIAGNOSIS — D84.821 DRUG-INDUCED IMMUNODEFICIENCY: ICD-10-CM

## 2025-07-29 DIAGNOSIS — D86.9 SARCOIDOSIS: ICD-10-CM

## 2025-07-29 LAB
ALBUMIN SERPL BCP-MCNC: 4.1 G/DL (ref 3.5–5.2)
ALP SERPL-CCNC: 53 UNIT/L (ref 40–150)
ALT SERPL W/O P-5'-P-CCNC: 21 UNIT/L (ref 0–55)
ANION GAP (OHS): 7 MMOL/L (ref 8–16)
AST SERPL-CCNC: 28 UNIT/L (ref 0–50)
BILIRUB SERPL-MCNC: 0.4 MG/DL (ref 0.1–1)
BUN SERPL-MCNC: 8 MG/DL (ref 8–23)
CALCIUM SERPL-MCNC: 8.6 MG/DL (ref 8.7–10.5)
CHLORIDE SERPL-SCNC: 105 MMOL/L (ref 95–110)
CK SERPL-CCNC: 70 U/L (ref 20–180)
CO2 SERPL-SCNC: 27 MMOL/L (ref 23–29)
CREAT SERPL-MCNC: 0.9 MG/DL (ref 0.5–1.4)
CRP SERPL-MCNC: 9.8 MG/L
ERYTHROCYTE [SEDIMENTATION RATE] IN BLOOD BY PHOTOMETRIC METHOD: 14 MM/HR
GFR SERPLBLD CREATININE-BSD FMLA CKD-EPI: >60 ML/MIN/1.73/M2
GLUCOSE SERPL-MCNC: 135 MG/DL (ref 70–110)
MAGNESIUM SERPL-MCNC: 1.7 MG/DL (ref 1.6–2.6)
PHOSPHATE SERPL-MCNC: 3.4 MG/DL (ref 2.7–4.5)
POTASSIUM SERPL-SCNC: 4.7 MMOL/L (ref 3.5–5.1)
PROT SERPL-MCNC: 6.6 GM/DL (ref 6–8.4)
SODIUM SERPL-SCNC: 139 MMOL/L (ref 136–145)

## 2025-07-29 PROCEDURE — 80053 COMPREHEN METABOLIC PANEL: CPT

## 2025-07-29 PROCEDURE — 82550 ASSAY OF CK (CPK): CPT

## 2025-07-29 PROCEDURE — 85652 RBC SED RATE AUTOMATED: CPT

## 2025-07-29 PROCEDURE — 83735 ASSAY OF MAGNESIUM: CPT

## 2025-07-29 PROCEDURE — 36415 COLL VENOUS BLD VENIPUNCTURE: CPT

## 2025-07-29 PROCEDURE — 84100 ASSAY OF PHOSPHORUS: CPT

## 2025-07-29 PROCEDURE — 86140 C-REACTIVE PROTEIN: CPT

## 2025-07-31 ENCOUNTER — TELEPHONE (OUTPATIENT)
Dept: INTERNAL MEDICINE | Facility: CLINIC | Age: 65
End: 2025-07-31
Payer: COMMERCIAL

## 2025-07-31 DIAGNOSIS — R11.2 NAUSEA AND VOMITING, UNSPECIFIED VOMITING TYPE: Primary | ICD-10-CM

## 2025-07-31 RX ORDER — ONDANSETRON 8 MG/1
8 TABLET, FILM COATED ORAL EVERY 6 HOURS
Qty: 120 TABLET | Refills: 1 | Status: SHIPPED | OUTPATIENT
Start: 2025-07-31

## 2025-07-31 NOTE — TELEPHONE ENCOUNTER
No care due was identified.  [facility] Embedded Care Due Messages. Reference number: [id number].   7/31/2025 1:48:35 PM CDT

## 2025-07-31 NOTE — TELEPHONE ENCOUNTER
Refill Encounter    PCP Visits: Recent Visits  Date Type Provider Dept   05/05/25 Office Visit Andrew Rodriguez MD [facility]   12/26/24 Office Visit Arturo Joseph PA-C [facility]   12/03/24 Office Visit Luis Rojas MD [facility]   11/14/24 Office Visit Andrew Rodriguez MD [facility]   08/23/24 Office Visit Andrew Rodriguez MD [facility]   Showing recent visits within past 360 days and meeting all other requirements  Future Appointments  No visits were found meeting these conditions.  Showing future appointments within next 720 days and meeting all other requirements      Last 3 Blood Pressure:   BP Readings from Last 3 Encounters:   07/17/25 (!) 158/72   06/05/25 133/61   06/02/25 133/67     Preferred Pharmacy:   [facility] #[id number] - [place] - [address] [facility] [address] [facility]  [address]  [facility]  [place] [address]  Phone: [phone] Fax: [phone]    [facility] [address] [place] [address]  Phone: [phone] Fax: [phone]    Requested RX:  Requested Prescriptions     Pending Prescriptions Disp Refills    ondansetron (ZOFRAN) 8 MG tablet 30 tablet 0     Sig: Take 1 tablet (8 mg total) by mouth every 8 (eight) hours as needed for Nausea.      RX Route: Normal

## 2025-08-04 ENCOUNTER — INFUSION (OUTPATIENT)
Dept: INFUSION THERAPY | Facility: HOSPITAL | Age: 65
End: 2025-08-04
Payer: COMMERCIAL

## 2025-08-04 ENCOUNTER — PATIENT MESSAGE (OUTPATIENT)
Dept: HEMATOLOGY/ONCOLOGY | Facility: CLINIC | Age: 65
End: 2025-08-04
Payer: COMMERCIAL

## 2025-08-04 VITALS
DIASTOLIC BLOOD PRESSURE: 78 MMHG | RESPIRATION RATE: 18 BRPM | TEMPERATURE: 98 F | SYSTOLIC BLOOD PRESSURE: 140 MMHG | HEIGHT: 66 IN | HEART RATE: 98 BPM | BODY MASS INDEX: 31.82 KG/M2 | WEIGHT: 198 LBS

## 2025-08-04 DIAGNOSIS — D50.9 IRON DEFICIENCY ANEMIA, UNSPECIFIED IRON DEFICIENCY ANEMIA TYPE: Primary | ICD-10-CM

## 2025-08-04 PROCEDURE — 25000003 PHARM REV CODE 250: Performed by: INTERNAL MEDICINE

## 2025-08-04 PROCEDURE — 96374 THER/PROPH/DIAG INJ IV PUSH: CPT

## 2025-08-04 PROCEDURE — 63600175 PHARM REV CODE 636 W HCPCS: Mod: JZ,TB | Performed by: INTERNAL MEDICINE

## 2025-08-04 RX ORDER — HEPARIN 100 UNIT/ML
500 SYRINGE INTRAVENOUS
OUTPATIENT
Start: 2025-08-04

## 2025-08-04 RX ORDER — SODIUM CHLORIDE 0.9 % (FLUSH) 0.9 %
10 SYRINGE (ML) INJECTION
Status: DISCONTINUED | OUTPATIENT
Start: 2025-08-04 | End: 2025-08-04 | Stop reason: HOSPADM

## 2025-08-04 RX ORDER — HEPARIN 100 UNIT/ML
500 SYRINGE INTRAVENOUS
Status: DISCONTINUED | OUTPATIENT
Start: 2025-08-04 | End: 2025-08-04 | Stop reason: HOSPADM

## 2025-08-04 RX ORDER — EPINEPHRINE 0.3 MG/.3ML
0.3 INJECTION SUBCUTANEOUS ONCE AS NEEDED
OUTPATIENT
Start: 2025-08-04

## 2025-08-04 RX ORDER — EPINEPHRINE 0.3 MG/.3ML
0.3 INJECTION SUBCUTANEOUS ONCE AS NEEDED
Status: DISCONTINUED | OUTPATIENT
Start: 2025-08-04 | End: 2025-08-04 | Stop reason: HOSPADM

## 2025-08-04 RX ORDER — SODIUM CHLORIDE 0.9 % (FLUSH) 0.9 %
10 SYRINGE (ML) INJECTION
OUTPATIENT
Start: 2025-08-04

## 2025-08-04 RX ADMIN — FERUMOXYTOL 510 MG: 510 INJECTION INTRAVENOUS at 02:08

## 2025-08-05 ENCOUNTER — TELEPHONE (OUTPATIENT)
Dept: ALLERGY | Facility: CLINIC | Age: 65
End: 2025-08-05
Payer: COMMERCIAL

## 2025-08-05 NOTE — TELEPHONE ENCOUNTER
Left a voice and portal message to patient about her appointment with Dr Newby on 08/11/25.  Patient advised to call office for further instruction.

## 2025-08-22 ENCOUNTER — HOSPITAL ENCOUNTER (EMERGENCY)
Facility: HOSPITAL | Age: 65
Discharge: HOME | End: 2025-08-22
Attending: STUDENT IN AN ORGANIZED HEALTH CARE EDUCATION/TRAINING PROGRAM
Payer: COMMERCIAL

## 2025-08-22 VITALS
HEIGHT: 66 IN | HEART RATE: 83 BPM | RESPIRATION RATE: 16 BRPM | DIASTOLIC BLOOD PRESSURE: 67 MMHG | SYSTOLIC BLOOD PRESSURE: 147 MMHG | TEMPERATURE: 99 F | OXYGEN SATURATION: 97 % | WEIGHT: 190 LBS | BODY MASS INDEX: 30.53 KG/M2

## 2025-08-22 DIAGNOSIS — N30.00 ACUTE CYSTITIS WITHOUT HEMATURIA: Primary | ICD-10-CM

## 2025-08-22 DIAGNOSIS — R11.2 N&V (NAUSEA AND VOMITING): ICD-10-CM

## 2025-08-22 DIAGNOSIS — F10.930 ALCOHOL WITHDRAWAL SYNDROME WITHOUT COMPLICATION: ICD-10-CM

## 2025-08-22 LAB
ABSOLUTE NEUTROPHIL MANUAL (OHS): 4.3 K/UL (ref 1.8–7.7)
ALBUMIN SERPL BCP-MCNC: 4.7 G/DL (ref 3.5–5.2)
ALP SERPL-CCNC: 60 UNIT/L (ref 40–150)
ALT SERPL W/O P-5'-P-CCNC: 43 UNIT/L (ref 0–55)
AMPHET UR QL SCN: NEGATIVE
ANION GAP (OHS): 10 MMOL/L (ref 8–16)
ANISOCYTOSIS BLD QL SMEAR: SLIGHT
AST SERPL-CCNC: 42 UNIT/L (ref 0–50)
BACTERIA #/AREA URNS AUTO: ABNORMAL /HPF
BARBITURATE SCN PRESENT UR: NEGATIVE
BASOPHILS NFR BLD MANUAL: 1 %
BENZODIAZ UR QL SCN: NEGATIVE
BILIRUB SERPL-MCNC: 0.4 MG/DL (ref 0.1–1)
BILIRUB UR QL STRIP.AUTO: NEGATIVE
BUN SERPL-MCNC: 14 MG/DL (ref 8–23)
CALCIUM SERPL-MCNC: 9.3 MG/DL (ref 8.7–10.5)
CANNABINOIDS UR QL SCN: ABNORMAL
CHLORIDE SERPL-SCNC: 102 MMOL/L (ref 95–110)
CLARITY UR: ABNORMAL
CO2 SERPL-SCNC: 25 MMOL/L (ref 23–29)
COCAINE UR QL SCN: NEGATIVE
COLOR UR AUTO: YELLOW
CREAT SERPL-MCNC: 1.1 MG/DL (ref 0.5–1.4)
CREAT UR-MCNC: 52 MG/DL (ref 15–325)
DACRYOCYTES BLD QL SMEAR: ABNORMAL
EOSINOPHIL NFR BLD MANUAL: 1 % (ref 0–8)
ERYTHROCYTE [DISTWIDTH] IN BLOOD BY AUTOMATED COUNT: 19.2 % (ref 11.5–14.5)
ETHANOL SERPL-MCNC: <10 MG/DL
GFR SERPLBLD CREATININE-BSD FMLA CKD-EPI: 55 ML/MIN/1.73/M2
GLUCOSE SERPL-MCNC: 134 MG/DL (ref 70–110)
GLUCOSE UR QL STRIP: NEGATIVE
HCT VFR BLD AUTO: 42.6 % (ref 37–48.5)
HGB BLD-MCNC: 13.4 GM/DL (ref 12–16)
HGB UR QL STRIP: NEGATIVE
HYPOCHROMIA BLD QL SMEAR: ABNORMAL
KETONES UR QL STRIP: NEGATIVE
LEUKOCYTE ESTERASE UR QL STRIP: ABNORMAL
LIPASE SERPL-CCNC: 22 U/L (ref 4–60)
LYMPHOCYTES NFR BLD MANUAL: 71 % (ref 18–48)
MAGNESIUM SERPL-MCNC: 1.9 MG/DL (ref 1.6–2.6)
MCH RBC QN AUTO: 27.7 PG (ref 27–31)
MCHC RBC AUTO-ENTMCNC: 31.5 G/DL (ref 32–36)
MCV RBC AUTO: 88 FL (ref 82–98)
METHADONE UR QL SCN: NEGATIVE
MICROSCOPIC COMMENT: ABNORMAL
MONOCYTES NFR BLD MANUAL: 2 % (ref 4–15)
NEUTROPHILS NFR BLD MANUAL: 25 % (ref 38–73)
NITRITE UR QL STRIP: POSITIVE
NUCLEATED RBC (/100WBC) (OHS): 0 /100 WBC
OHS QRS DURATION: 82 MS
OHS QTC CALCULATION: 411 MS
OPIATES UR QL SCN: NEGATIVE
OVALOCYTES BLD QL SMEAR: ABNORMAL
PCP UR QL: NEGATIVE
PH UR STRIP: 7 [PH]
PLATELET # BLD AUTO: 105 K/UL (ref 150–450)
PLATELET BLD QL SMEAR: ABNORMAL
PMV BLD AUTO: 10.8 FL (ref 9.2–12.9)
POIKILOCYTOSIS BLD QL SMEAR: SLIGHT
POTASSIUM SERPL-SCNC: 5 MMOL/L (ref 3.5–5.1)
PROT SERPL-MCNC: 7.2 GM/DL (ref 6–8.4)
PROT UR QL STRIP: NEGATIVE
RBC # BLD AUTO: 4.83 M/UL (ref 4–5.4)
RBC #/AREA URNS AUTO: 2 /HPF (ref 0–4)
SODIUM SERPL-SCNC: 137 MMOL/L (ref 136–145)
SP GR UR STRIP: 1.01
SQUAMOUS #/AREA URNS AUTO: 5 /HPF
UROBILINOGEN UR STRIP-ACNC: NEGATIVE EU/DL
WBC # BLD AUTO: 17.26 K/UL (ref 3.9–12.7)
WBC #/AREA URNS AUTO: 31 /HPF (ref 0–5)

## 2025-08-22 PROCEDURE — 85025 COMPLETE CBC W/AUTO DIFF WBC: CPT | Performed by: STUDENT IN AN ORGANIZED HEALTH CARE EDUCATION/TRAINING PROGRAM

## 2025-08-22 PROCEDURE — 96374 THER/PROPH/DIAG INJ IV PUSH: CPT

## 2025-08-22 PROCEDURE — 96375 TX/PRO/DX INJ NEW DRUG ADDON: CPT

## 2025-08-22 PROCEDURE — 25000003 PHARM REV CODE 250: Performed by: STUDENT IN AN ORGANIZED HEALTH CARE EDUCATION/TRAINING PROGRAM

## 2025-08-22 PROCEDURE — 82077 ASSAY SPEC XCP UR&BREATH IA: CPT | Performed by: STUDENT IN AN ORGANIZED HEALTH CARE EDUCATION/TRAINING PROGRAM

## 2025-08-22 PROCEDURE — 63600175 PHARM REV CODE 636 W HCPCS: Performed by: STUDENT IN AN ORGANIZED HEALTH CARE EDUCATION/TRAINING PROGRAM

## 2025-08-22 PROCEDURE — 93010 ELECTROCARDIOGRAM REPORT: CPT | Mod: ,,, | Performed by: INTERNAL MEDICINE

## 2025-08-22 PROCEDURE — 83690 ASSAY OF LIPASE: CPT | Performed by: STUDENT IN AN ORGANIZED HEALTH CARE EDUCATION/TRAINING PROGRAM

## 2025-08-22 PROCEDURE — 81001 URINALYSIS AUTO W/SCOPE: CPT | Performed by: STUDENT IN AN ORGANIZED HEALTH CARE EDUCATION/TRAINING PROGRAM

## 2025-08-22 PROCEDURE — 25500020 PHARM REV CODE 255: Performed by: STUDENT IN AN ORGANIZED HEALTH CARE EDUCATION/TRAINING PROGRAM

## 2025-08-22 PROCEDURE — 87088 URINE BACTERIA CULTURE: CPT | Performed by: STUDENT IN AN ORGANIZED HEALTH CARE EDUCATION/TRAINING PROGRAM

## 2025-08-22 PROCEDURE — 93005 ELECTROCARDIOGRAM TRACING: CPT

## 2025-08-22 PROCEDURE — 83735 ASSAY OF MAGNESIUM: CPT | Performed by: STUDENT IN AN ORGANIZED HEALTH CARE EDUCATION/TRAINING PROGRAM

## 2025-08-22 PROCEDURE — 99285 EMERGENCY DEPT VISIT HI MDM: CPT | Mod: 25

## 2025-08-22 PROCEDURE — 80307 DRUG TEST PRSMV CHEM ANLYZR: CPT | Performed by: STUDENT IN AN ORGANIZED HEALTH CARE EDUCATION/TRAINING PROGRAM

## 2025-08-22 PROCEDURE — 82040 ASSAY OF SERUM ALBUMIN: CPT | Performed by: STUDENT IN AN ORGANIZED HEALTH CARE EDUCATION/TRAINING PROGRAM

## 2025-08-22 PROCEDURE — 96361 HYDRATE IV INFUSION ADD-ON: CPT

## 2025-08-22 RX ORDER — CEFTRIAXONE 1 G/1
1 INJECTION, POWDER, FOR SOLUTION INTRAMUSCULAR; INTRAVENOUS
Status: COMPLETED | OUTPATIENT
Start: 2025-08-22 | End: 2025-08-22

## 2025-08-22 RX ORDER — CEPHALEXIN 500 MG/1
500 CAPSULE ORAL 4 TIMES DAILY
Qty: 20 CAPSULE | Refills: 0 | Status: SHIPPED | OUTPATIENT
Start: 2025-08-22 | End: 2025-08-27

## 2025-08-22 RX ORDER — ONDANSETRON HYDROCHLORIDE 2 MG/ML
4 INJECTION, SOLUTION INTRAVENOUS
Status: COMPLETED | OUTPATIENT
Start: 2025-08-22 | End: 2025-08-22

## 2025-08-22 RX ORDER — LORAZEPAM 2 MG/ML
1 INJECTION INTRAMUSCULAR
Status: COMPLETED | OUTPATIENT
Start: 2025-08-22 | End: 2025-08-22

## 2025-08-22 RX ADMIN — SODIUM CHLORIDE 1000 ML: 9 INJECTION, SOLUTION INTRAVENOUS at 12:08

## 2025-08-22 RX ADMIN — ONDANSETRON 4 MG: 2 INJECTION INTRAMUSCULAR; INTRAVENOUS at 12:08

## 2025-08-22 RX ADMIN — IOHEXOL 100 ML: 350 INJECTION, SOLUTION INTRAVENOUS at 12:08

## 2025-08-22 RX ADMIN — CEFTRIAXONE SODIUM 1 G: 1 INJECTION, POWDER, FOR SOLUTION INTRAMUSCULAR; INTRAVENOUS at 01:08

## 2025-08-22 RX ADMIN — LORAZEPAM 1 MG: 2 INJECTION INTRAMUSCULAR; INTRAVENOUS at 12:08

## 2025-08-23 ENCOUNTER — RESULTS FOLLOW-UP (OUTPATIENT)
Dept: EMERGENCY MEDICINE | Facility: HOSPITAL | Age: 65
End: 2025-08-23
Payer: COMMERCIAL

## 2025-08-23 LAB — HOLD SPECIMEN: NORMAL

## 2025-08-25 LAB
BACTERIA UR CULT: ABNORMAL
BACTERIA UR CULT: ABNORMAL

## 2025-09-03 ENCOUNTER — PATIENT MESSAGE (OUTPATIENT)
Dept: HEMATOLOGY/ONCOLOGY | Facility: CLINIC | Age: 65
End: 2025-09-03
Payer: COMMERCIAL

## 2025-09-03 DIAGNOSIS — R73.9 HYPERGLYCEMIA: ICD-10-CM

## 2025-09-03 DIAGNOSIS — C91.10 CHRONIC LYMPHOCYTIC LEUKEMIA: Primary | ICD-10-CM

## (undated) DEVICE — SUT MCRYL PLUS 4-0 PS2 27IN

## (undated) DEVICE — STAPLER SKIN ROTATING HEAD

## (undated) DEVICE — NDL HYPO REG 25G X 1 1/2

## (undated) DEVICE — PAD ABD 8X10 STERILE

## (undated) DEVICE — DURAPREP SURG SCRUB 26ML

## (undated) DEVICE — SUT PDS II 2-0 CT1

## (undated) DEVICE — SYR 30CC LUER LOCK

## (undated) DEVICE — BLADE SURG CARBON STEEL #10

## (undated) DEVICE — ADHESIVE DERMABOND ADVANCED

## (undated) DEVICE — TRAY SKIN SCRUB WET PREMIUM

## (undated) DEVICE — DRAIN CHANNEL ROUND 15FR

## (undated) DEVICE — SOL NS 1000CC

## (undated) DEVICE — EVACUATOR WOUND BULB 100CC

## (undated) DEVICE — DRAPE CESAREAN IOBAN POUCH

## (undated) DEVICE — NDL 18GA X1 1/2 REG BEVEL

## (undated) DEVICE — TRAY MINOR GEN SURG

## (undated) DEVICE — STAPLER SKIN PROXIMATE WIDE

## (undated) DEVICE — SUT MONOCRYL 3-0 SH U/D

## (undated) DEVICE — SEE MEDLINE ITEM 152512

## (undated) DEVICE — SEE MEDLINE ITEM 157128

## (undated) DEVICE — TOWEL OR NONABSORB ADH 17X26

## (undated) DEVICE — SUT ETHILON 2-0 PSLX 30IN

## (undated) DEVICE — ELECTRODE REM PLYHSV RETURN 9

## (undated) DEVICE — GOWN SURGICAL X-LARGE

## (undated) DEVICE — DRAPE STERI INSTRUMENT 1018

## (undated) DEVICE — DRESSING XEROFORM FOIL PK 1X8

## (undated) DEVICE — SUT PROLENE 5-0 PS-2 18

## (undated) DEVICE — MANIFOLD 4 PORT

## (undated) DEVICE — SUT MONOCRYL 3-0 PS-2 UND

## (undated) DEVICE — SEE MEDLINE ITEM 152622

## (undated) DEVICE — SYR 50CC LL

## (undated) DEVICE — IMPLANTABLE DEVICE
Type: IMPLANTABLE DEVICE | Site: BREAST | Status: NON-FUNCTIONAL
Removed: 2021-02-11

## (undated) DEVICE — HOOK STAY ELAS 5MM 8EA/PK

## (undated) DEVICE — APPLIER CLIP LIAGCLIP 9.375IN

## (undated) DEVICE — NDL SPINAL SPINOCAN 22GX3.5

## (undated) DEVICE — APPLICATOR CHLORAPREP ORN 26ML

## (undated) DEVICE — SUT VICRYL CTD 2-0 GI 27 SH

## (undated) DEVICE — GAUZE SPONGE 4X4 12PLY

## (undated) DEVICE — SEE MEDLINE ITEM 152487

## (undated) DEVICE — SEE MEDLINE ITEM 146347

## (undated) DEVICE — SEE MEDLINE ITEM 146417

## (undated) DEVICE — SPONGE DERMACEA GAUZE 4X4

## (undated) DEVICE — SCREENER ISTIM EXT NEROSTIMLTR

## (undated) DEVICE — DRAPE C-ARMOR EQUIPMENT COVER

## (undated) DEVICE — SIZER BREAST NATRELLE 650ML
Type: IMPLANTABLE DEVICE | Site: BREAST | Status: NON-FUNCTIONAL
Removed: 2021-02-11

## (undated) DEVICE — DRESSING LEUKOPLAST FLEX 1X3IN

## (undated) DEVICE — SOL WATER STRL IRR 1000ML

## (undated) DEVICE — SUT 2-0 VICRYL / CT-1

## (undated) DEVICE — SYS LABEL CORRECT MED

## (undated) DEVICE — NEOGUARD COVER 4X30CM STERILE

## (undated) DEVICE — GOWN AERO CHROME W/ TOWEL XL

## (undated) DEVICE — SUT SILK 2-0 PS 18IN BLACK

## (undated) DEVICE — DRESSING ANTIMICROBIAL 3/4 IN

## (undated) DEVICE — PACK UNIVERSAL SPLIT II

## (undated) DEVICE — BRA STYLE 7 CUP LARGE

## (undated) DEVICE — DRESSING TRANS 4X4 TEGADERM

## (undated) DEVICE — BOVIE SUCTION

## (undated) DEVICE — SUT CTD VICRYL VIL BR CT-2

## (undated) DEVICE — COVERS PROBE NR-48 STERILE

## (undated) DEVICE — DRAIN CHANNEL ROUND 19FR

## (undated) DEVICE — TOWEL OR DISP STRL BLUE 4/PK

## (undated) DEVICE — SKINMARKER & RULER REGULAR X-F

## (undated) DEVICE — BANDAGE ADHESIVE

## (undated) DEVICE — GAUZE FLUFF XXLG 36X36 2 PLY

## (undated) DEVICE — KIT INTERSTIM II PERC LEAD EXT

## (undated) DEVICE — SUT SILK 3-0 SH 18IN BLACK

## (undated) DEVICE — SUT VICRYL PLUS 4-0 P3 18IN

## (undated) DEVICE — BLADE SURG #15 CARBON STEEL

## (undated) DEVICE — SPONGE LAP 18X18 PREWASHED

## (undated) DEVICE — DRESSING ANTIMICROBIAL 1 INCH

## (undated) DEVICE — HANDSET INTERSTIM X COMM

## (undated) DEVICE — KIT PREVENA PLUS

## (undated) DEVICE — ELECTRODE BLADE INSULATED 1 IN

## (undated) DEVICE — DRESSING TRANS 2X2 TEGADERM

## (undated) DEVICE — CUP MEDICINE STERILE 2OZ

## (undated) DEVICE — TRAY FOLEY 16FR INFECTION CONT

## (undated) DEVICE — SCRUB HIBICLENS 4% CHG 4OZ

## (undated) DEVICE — DRAPE C ARM 42 X 120 10/BX

## (undated) DEVICE — SUT CTD VICRYL 4-0 BR PS-2

## (undated) DEVICE — SUT ETHILON 5-0 18IN PLAIN

## (undated) DEVICE — CANISTER INFOV.A.C WOUND 500ML

## (undated) DEVICE — ELECTRODE EXTENDED BLADE

## (undated) DEVICE — SET FLUID TRANSFER ASEPTIC

## (undated) DEVICE — SUT VICRYL 3-0 27 SH

## (undated) DEVICE — BRA POST SURGICAL WHT 40-42IN

## (undated) DEVICE — DRAPE THYROID WITH ARMBOARD

## (undated) DEVICE — SUT CTD VICRYL VIL BR UR-6

## (undated) DEVICE — BLADE SURG CARBON STEEL SZ11

## (undated) DEVICE — SUT CTD VICRYL 3-0 CR/SH

## (undated) DEVICE — SYS PRINEO SKIN CLOSURE

## (undated) DEVICE — SUT VICRYL 2-0 36 CT-1

## (undated) DEVICE — TRAY DRY SKIN SCRUB PREP